# Patient Record
Sex: FEMALE | Race: ASIAN | NOT HISPANIC OR LATINO | URBAN - METROPOLITAN AREA
[De-identification: names, ages, dates, MRNs, and addresses within clinical notes are randomized per-mention and may not be internally consistent; named-entity substitution may affect disease eponyms.]

---

## 2017-10-13 ENCOUNTER — INPATIENT (INPATIENT)
Facility: HOSPITAL | Age: 72
LOS: 6 days | Discharge: HOME HEALTH | End: 2017-10-20
Attending: INTERNAL MEDICINE | Admitting: INTERNAL MEDICINE
Payer: MEDICAID

## 2017-10-13 VITALS
TEMPERATURE: 98 F | HEART RATE: 79 BPM | SYSTOLIC BLOOD PRESSURE: 151 MMHG | WEIGHT: 108.03 LBS | RESPIRATION RATE: 18 BRPM | OXYGEN SATURATION: 99 % | DIASTOLIC BLOOD PRESSURE: 73 MMHG

## 2017-10-13 DIAGNOSIS — Z95.1 PRESENCE OF AORTOCORONARY BYPASS GRAFT: Chronic | ICD-10-CM

## 2017-10-13 DIAGNOSIS — R07.9 CHEST PAIN, UNSPECIFIED: ICD-10-CM

## 2017-10-13 DIAGNOSIS — N18.3 CHRONIC KIDNEY DISEASE, STAGE 3 (MODERATE): ICD-10-CM

## 2017-10-13 DIAGNOSIS — I10 ESSENTIAL (PRIMARY) HYPERTENSION: ICD-10-CM

## 2017-10-13 DIAGNOSIS — R07.89 OTHER CHEST PAIN: ICD-10-CM

## 2017-10-13 LAB
BASOPHILS # BLD AUTO: 0.04 K/UL — SIGNIFICANT CHANGE UP (ref 0–0.2)
BASOPHILS NFR BLD AUTO: 0.3 % — SIGNIFICANT CHANGE UP (ref 0–2)
BLD GP AB SCN SERPL QL: NEGATIVE — SIGNIFICANT CHANGE UP
BUN SERPL-MCNC: 24 MG/DL — HIGH (ref 7–23)
CALCIUM SERPL-MCNC: 9.6 MG/DL — SIGNIFICANT CHANGE UP (ref 8.4–10.5)
CHLORIDE SERPL-SCNC: 101 MMOL/L — SIGNIFICANT CHANGE UP (ref 98–107)
CO2 SERPL-SCNC: 22 MMOL/L — SIGNIFICANT CHANGE UP (ref 22–31)
CREAT SERPL-MCNC: 1.53 MG/DL — HIGH (ref 0.5–1.3)
EOSINOPHIL # BLD AUTO: 0.27 K/UL — SIGNIFICANT CHANGE UP (ref 0–0.5)
EOSINOPHIL NFR BLD AUTO: 2.3 % — SIGNIFICANT CHANGE UP (ref 0–6)
GLUCOSE SERPL-MCNC: 307 MG/DL — HIGH (ref 70–99)
HBA1C BLD-MCNC: 10.4 % — HIGH (ref 4–5.6)
HCT VFR BLD CALC: 33.7 % — LOW (ref 34.5–45)
HCT VFR BLD CALC: 34.8 % — SIGNIFICANT CHANGE UP (ref 34.5–45)
HCT VFR BLD CALC: 40.1 % — SIGNIFICANT CHANGE UP (ref 34.5–45)
HGB BLD-MCNC: 10.9 G/DL — LOW (ref 11.5–15.5)
HGB BLD-MCNC: 11.4 G/DL — LOW (ref 11.5–15.5)
HGB BLD-MCNC: 13.1 G/DL — SIGNIFICANT CHANGE UP (ref 11.5–15.5)
IMM GRANULOCYTES # BLD AUTO: 0.06 # — SIGNIFICANT CHANGE UP
IMM GRANULOCYTES NFR BLD AUTO: 0.5 % — SIGNIFICANT CHANGE UP (ref 0–1.5)
LYMPHOCYTES # BLD AUTO: 29 % — SIGNIFICANT CHANGE UP (ref 13–44)
LYMPHOCYTES # BLD AUTO: 3.34 K/UL — HIGH (ref 1–3.3)
MCHC RBC-ENTMCNC: 27.9 PG — SIGNIFICANT CHANGE UP (ref 27–34)
MCHC RBC-ENTMCNC: 28.3 PG — SIGNIFICANT CHANGE UP (ref 27–34)
MCHC RBC-ENTMCNC: 28.4 PG — SIGNIFICANT CHANGE UP (ref 27–34)
MCHC RBC-ENTMCNC: 32.3 % — SIGNIFICANT CHANGE UP (ref 32–36)
MCHC RBC-ENTMCNC: 32.7 % — SIGNIFICANT CHANGE UP (ref 32–36)
MCHC RBC-ENTMCNC: 32.8 % — SIGNIFICANT CHANGE UP (ref 32–36)
MCV RBC AUTO: 86.2 FL — SIGNIFICANT CHANGE UP (ref 80–100)
MCV RBC AUTO: 86.4 FL — SIGNIFICANT CHANGE UP (ref 80–100)
MCV RBC AUTO: 87 FL — SIGNIFICANT CHANGE UP (ref 80–100)
MONOCYTES # BLD AUTO: 0.87 K/UL — SIGNIFICANT CHANGE UP (ref 0–0.9)
MONOCYTES NFR BLD AUTO: 7.5 % — SIGNIFICANT CHANGE UP (ref 2–14)
NEUTROPHILS # BLD AUTO: 6.95 K/UL — SIGNIFICANT CHANGE UP (ref 1.8–7.4)
NEUTROPHILS NFR BLD AUTO: 60.4 % — SIGNIFICANT CHANGE UP (ref 43–77)
NRBC # FLD: 0 — SIGNIFICANT CHANGE UP
PLATELET # BLD AUTO: 259 K/UL — SIGNIFICANT CHANGE UP (ref 150–400)
PLATELET # BLD AUTO: 266 K/UL — SIGNIFICANT CHANGE UP (ref 150–400)
PLATELET # BLD AUTO: 268 K/UL — SIGNIFICANT CHANGE UP (ref 150–400)
PMV BLD: 9.1 FL — SIGNIFICANT CHANGE UP (ref 7–13)
PMV BLD: 9.1 FL — SIGNIFICANT CHANGE UP (ref 7–13)
PMV BLD: 9.2 FL — SIGNIFICANT CHANGE UP (ref 7–13)
POTASSIUM SERPL-MCNC: 4.1 MMOL/L — SIGNIFICANT CHANGE UP (ref 3.5–5.3)
POTASSIUM SERPL-SCNC: 4.1 MMOL/L — SIGNIFICANT CHANGE UP (ref 3.5–5.3)
RBC # BLD: 3.91 M/UL — SIGNIFICANT CHANGE UP (ref 3.8–5.2)
RBC # BLD: 4.03 M/UL — SIGNIFICANT CHANGE UP (ref 3.8–5.2)
RBC # BLD: 4.61 M/UL — SIGNIFICANT CHANGE UP (ref 3.8–5.2)
RBC # FLD: 13.9 % — SIGNIFICANT CHANGE UP (ref 10.3–14.5)
RBC # FLD: 14 % — SIGNIFICANT CHANGE UP (ref 10.3–14.5)
RBC # FLD: 14.1 % — SIGNIFICANT CHANGE UP (ref 10.3–14.5)
RH IG SCN BLD-IMP: POSITIVE — SIGNIFICANT CHANGE UP
SODIUM SERPL-SCNC: 139 MMOL/L — SIGNIFICANT CHANGE UP (ref 135–145)
WBC # BLD: 10.67 K/UL — HIGH (ref 3.8–10.5)
WBC # BLD: 11.53 K/UL — HIGH (ref 3.8–10.5)
WBC # BLD: 8.72 K/UL — SIGNIFICANT CHANGE UP (ref 3.8–10.5)
WBC # FLD AUTO: 10.67 K/UL — HIGH (ref 3.8–10.5)
WBC # FLD AUTO: 11.53 K/UL — HIGH (ref 3.8–10.5)
WBC # FLD AUTO: 8.72 K/UL — SIGNIFICANT CHANGE UP (ref 3.8–10.5)

## 2017-10-13 PROCEDURE — 93010 ELECTROCARDIOGRAM REPORT: CPT

## 2017-10-13 RX ORDER — HYDROMORPHONE HYDROCHLORIDE 2 MG/ML
1 INJECTION INTRAMUSCULAR; INTRAVENOUS; SUBCUTANEOUS ONCE
Qty: 0 | Refills: 0 | Status: DISCONTINUED | OUTPATIENT
Start: 2017-10-13 | End: 2017-10-13

## 2017-10-13 RX ORDER — DEXTROSE 50 % IN WATER 50 %
25 SYRINGE (ML) INTRAVENOUS ONCE
Qty: 0 | Refills: 0 | Status: DISCONTINUED | OUTPATIENT
Start: 2017-10-13 | End: 2017-10-20

## 2017-10-13 RX ORDER — SODIUM CHLORIDE 9 MG/ML
1000 INJECTION, SOLUTION INTRAVENOUS
Qty: 0 | Refills: 0 | Status: DISCONTINUED | OUTPATIENT
Start: 2017-10-13 | End: 2017-10-20

## 2017-10-13 RX ORDER — DEXTROSE 50 % IN WATER 50 %
12.5 SYRINGE (ML) INTRAVENOUS ONCE
Qty: 0 | Refills: 0 | Status: DISCONTINUED | OUTPATIENT
Start: 2017-10-13 | End: 2017-10-20

## 2017-10-13 RX ORDER — DEXTROSE 50 % IN WATER 50 %
1 SYRINGE (ML) INTRAVENOUS ONCE
Qty: 0 | Refills: 0 | Status: DISCONTINUED | OUTPATIENT
Start: 2017-10-13 | End: 2017-10-20

## 2017-10-13 RX ORDER — INSULIN LISPRO 100/ML
24 VIAL (ML) SUBCUTANEOUS
Qty: 0 | Refills: 0 | Status: DISCONTINUED | OUTPATIENT
Start: 2017-10-13 | End: 2017-10-20

## 2017-10-13 RX ORDER — ASPIRIN/CALCIUM CARB/MAGNESIUM 324 MG
81 TABLET ORAL ONCE
Qty: 0 | Refills: 0 | Status: COMPLETED | OUTPATIENT
Start: 2017-10-13 | End: 2017-10-13

## 2017-10-13 RX ORDER — INSULIN LISPRO 100/ML
VIAL (ML) SUBCUTANEOUS
Qty: 0 | Refills: 0 | Status: DISCONTINUED | OUTPATIENT
Start: 2017-10-13 | End: 2017-10-17

## 2017-10-13 RX ORDER — MONTELUKAST 4 MG/1
10 TABLET, CHEWABLE ORAL AT BEDTIME
Qty: 0 | Refills: 0 | Status: DISCONTINUED | OUTPATIENT
Start: 2017-10-13 | End: 2017-10-20

## 2017-10-13 RX ORDER — ACETYLCYSTEINE 200 MG/ML
1200 VIAL (ML) MISCELLANEOUS
Qty: 0 | Refills: 0 | Status: COMPLETED | OUTPATIENT
Start: 2017-10-13 | End: 2017-10-13

## 2017-10-13 RX ORDER — FAMOTIDINE 10 MG/ML
20 INJECTION INTRAVENOUS DAILY
Qty: 0 | Refills: 0 | Status: DISCONTINUED | OUTPATIENT
Start: 2017-10-13 | End: 2017-10-20

## 2017-10-13 RX ORDER — INSULIN GLARGINE 100 [IU]/ML
65 INJECTION, SOLUTION SUBCUTANEOUS AT BEDTIME
Qty: 0 | Refills: 0 | Status: DISCONTINUED | OUTPATIENT
Start: 2017-10-13 | End: 2017-10-20

## 2017-10-13 RX ORDER — ATORVASTATIN CALCIUM 80 MG/1
40 TABLET, FILM COATED ORAL AT BEDTIME
Qty: 0 | Refills: 0 | Status: DISCONTINUED | OUTPATIENT
Start: 2017-10-13 | End: 2017-10-20

## 2017-10-13 RX ORDER — SODIUM BICARBONATE 1 MEQ/ML
0.23 SYRINGE (ML) INTRAVENOUS
Qty: 150 | Refills: 0 | Status: DISCONTINUED | OUTPATIENT
Start: 2017-10-13 | End: 2017-10-20

## 2017-10-13 RX ORDER — SODIUM CHLORIDE 9 MG/ML
3 INJECTION INTRAMUSCULAR; INTRAVENOUS; SUBCUTANEOUS EVERY 8 HOURS
Qty: 0 | Refills: 0 | Status: DISCONTINUED | OUTPATIENT
Start: 2017-10-13 | End: 2017-10-20

## 2017-10-13 RX ORDER — ASPIRIN/CALCIUM CARB/MAGNESIUM 324 MG
81 TABLET ORAL DAILY
Qty: 0 | Refills: 0 | Status: DISCONTINUED | OUTPATIENT
Start: 2017-10-14 | End: 2017-10-20

## 2017-10-13 RX ORDER — METOPROLOL TARTRATE 50 MG
25 TABLET ORAL
Qty: 0 | Refills: 0 | Status: DISCONTINUED | OUTPATIENT
Start: 2017-10-13 | End: 2017-10-20

## 2017-10-13 RX ORDER — FUROSEMIDE 40 MG
20 TABLET ORAL DAILY
Qty: 0 | Refills: 0 | Status: DISCONTINUED | OUTPATIENT
Start: 2017-10-13 | End: 2017-10-17

## 2017-10-13 RX ORDER — SODIUM CHLORIDE 9 MG/ML
500 INJECTION INTRAMUSCULAR; INTRAVENOUS; SUBCUTANEOUS
Qty: 0 | Refills: 0 | Status: DISCONTINUED | OUTPATIENT
Start: 2017-10-13 | End: 2017-10-20

## 2017-10-13 RX ORDER — LEVOTHYROXINE SODIUM 125 MCG
50 TABLET ORAL DAILY
Qty: 0 | Refills: 0 | Status: DISCONTINUED | OUTPATIENT
Start: 2017-10-13 | End: 2017-10-20

## 2017-10-13 RX ORDER — GLUCAGON INJECTION, SOLUTION 0.5 MG/.1ML
1 INJECTION, SOLUTION SUBCUTANEOUS ONCE
Qty: 0 | Refills: 0 | Status: DISCONTINUED | OUTPATIENT
Start: 2017-10-13 | End: 2017-10-20

## 2017-10-13 RX ADMIN — Medication 1200 MILLIGRAM(S): at 22:49

## 2017-10-13 RX ADMIN — ATORVASTATIN CALCIUM 40 MILLIGRAM(S): 80 TABLET, FILM COATED ORAL at 22:10

## 2017-10-13 RX ADMIN — HYDROMORPHONE HYDROCHLORIDE 1 MILLIGRAM(S): 2 INJECTION INTRAMUSCULAR; INTRAVENOUS; SUBCUTANEOUS at 19:00

## 2017-10-13 RX ADMIN — SODIUM CHLORIDE 3 MILLILITER(S): 9 INJECTION INTRAMUSCULAR; INTRAVENOUS; SUBCUTANEOUS at 22:09

## 2017-10-13 RX ADMIN — HYDROMORPHONE HYDROCHLORIDE 1 MILLIGRAM(S): 2 INJECTION INTRAMUSCULAR; INTRAVENOUS; SUBCUTANEOUS at 18:29

## 2017-10-13 RX ADMIN — Medication 81 MILLIGRAM(S): at 14:04

## 2017-10-13 RX ADMIN — Medication 1200 MILLIGRAM(S): at 14:04

## 2017-10-13 RX ADMIN — Medication 4: at 19:04

## 2017-10-13 RX ADMIN — Medication 2: at 13:59

## 2017-10-13 RX ADMIN — INSULIN GLARGINE 65 UNIT(S): 100 INJECTION, SOLUTION SUBCUTANEOUS at 22:52

## 2017-10-13 RX ADMIN — MONTELUKAST 10 MILLIGRAM(S): 4 TABLET, CHEWABLE ORAL at 22:10

## 2017-10-13 RX ADMIN — Medication 75 MEQ/KG/HR: at 20:09

## 2017-10-13 NOTE — H&P CARDIOLOGY - PMH
CAD (coronary artery disease)  s/p CABG  Diabetes mellitus, type 2    GERD (gastroesophageal reflux disease)    Hyperlipidemia    Hypothyroidism

## 2017-10-13 NOTE — CHART NOTE - NSCHARTNOTEFT_GEN_A_CORE
case discussed with Dr Ga, monitor Cr this week, no need for post IVF hydration at this time given LVEDP 24, continue ASA, no plavix at this time, will determine PCI of ostial ramus Monday if Cr stable

## 2017-10-13 NOTE — PATIENT PROFILE ADULT. - VISION (WITH CORRECTIVE LENSES IF THE PATIENT USUALLY WEARS THEM):
Partially impaired: cannot see medication labels or newsprint, but can see obstacles in path, and the surrounding layout; can count fingers at arm's length/with eye glasses

## 2017-10-13 NOTE — CONSULT NOTE ADULT - ASSESSMENT
71 year old Slovak speaking female with HTN, hyperlipidemia, DM-II, known CAD with 3V CABG (LIMA to LAD, SVG to OM, SVG to PDA) at [place] [address] who c/o chest pain with subsequent abnormal stress test.   In light of patients CAD history, symptoms and abnormal noninvasive test findings there is high suspicion for CAD progression. Patient is now referred to [facility] for a cardiac catheterization with possible PTCA/stent.    1 CAD  - Telemonitor   - sp cardiac cath  - cw current meds  - cards fu    2 DM  - monitor FS  - sliding scale coverage    3 HTN  - cw home meds

## 2017-10-13 NOTE — CHART NOTE - NSCHARTNOTEFT_GEN_A_CORE
s/p Cath with large hematoma, s/p compression.  VSS.  CBC and Type and screen sent stat.  US Groin ordered.  Pt. stable.  Bedrest.  d/w cath lab and attending.

## 2017-10-13 NOTE — CONSULT NOTE ADULT - SUBJECTIVE AND OBJECTIVE BOX
cc here for cardiac cath  [place] 71 year old Syriac speaking female with HTN, hyperlipidemia, DM-II, known CAD with 3V CABG (LIMA to LAD, SVG to OM, SVG to PDA) at [place] [address] who c/o chest pain with subsequent abnormal stress test.   In light of patients CAD history, symptoms and abnormal noninvasive test findings there is high suspicion for CAD progression. Patient is now referred to [facility] for a cardiac catheterization with possible PTCA/stent.      Allergies NKDA    Review of systems    REVIEW OF SYSTEMS:    CONSTITUTIONAL: No weakness, fevers or chills  EYES/ENT: No visual changes;  No vertigo or throat pain   NECK: No pain or stiffness  RESPIRATORY: No cough, wheezing, hemoptysis; No shortness of breath  CARDIOVASCULAR: No chest pain or palpitations  GASTROINTESTINAL: No abdominal or epigastric pain. No nausea, vomiting, or hematemesis; No diarrhea or constipation. No melena or hematochezia.  GENITOURINARY: No dysuria, frequency or hematuria  NEUROLOGICAL: No numbness or weakness  SKIN: No itching, burning, rashes, or lesions   All other review of systems is negative unless indicated above.    Past Medical History:  CAD (coronary artery disease)  s/p CABG  Diabetes mellitus, type 2    GERD (gastroesophageal reflux disease)    Hyperlipidemia    Hypothyroidism.    Home Medications:   * Patient Currently Takes Medications as of 13-Oct-2017 09:32 documented in Structured Notes  · 	aspirin 81 mg oral delayed release tablet: Last Dose Taken:  , 1 tab(s) orally once a day  · 	metoprolol tartrate 25 mg oral tablet: Last Dose Taken:  , 1 tab(s) orally 2 times a day  · 	montelukast 10 mg oral tablet: Last Dose Taken:  , 1 tab(s) orally once a day (at bedtime)  · 	levothyroxine 50 mcg (0.05 mg) oral tablet: Last Dose Taken:  , 1 tab(s) orally once a day  · 	Lasix 20 mg oral tablet: Last Dose Taken:  , 1 tab(s) orally once a day  · 	Mapap 500 mg oral capsule: 1 tab(s) orally once a day  · 	Vol-Care Rx oral tablet: 1 tab(s) orally once a day  · 	raNITIdine 150 mg oral capsule: 1 cap(s) orally 2 times a day  · 	atorvastatin 40 mg oral tablet: Last Dose Taken:  , 1 tab(s) orally once a day  · 	alendronate 70 mg oral tablet: Last Dose Taken:  , 1 tab(s) orally once a week  · 	Apidra 100 units/mL subcutaneous solution: Last Dose Taken:  , 24 unit(s) subcutaneous once a day (in the morning)  · 	Apidra 100 units/mL subcutaneous solution: Last Dose Taken:  , 26 microcurie subcutaneous with lunch and dinner  · 	Toujeo SoloStar 300 units/mL subcutaneous solution: 65 unit(s) subcutaneous once a day (at bedtime)    Past Surgical History:  S/P CABG (coronary artery bypass graft).      Tobacco Usage:  · Tobacco Usage: Never smoker	    Family History:  No pertinent family history in first degree relatives.    Physical exam    General: WN/WD NAD  Neurology: A&Ox3, nonfocal, CUADRA x 4  Eyes: PERRLA/ EOMI, Gross vision intact  ENT/Neck: Neck supple, trachea midline, No JVD, Gross hearing intact  Respiratory: CTA B/L, No wheezing, rales, rhonchi  CV: RRR, S1S2, no murmurs, rubs or gallops  Abdominal: Soft, NT, ND +BS,   Extremities: No edema, + peripheral pulses  Skin: No Rashes, Hematoma, Ecchymosis      Labs                            13.1   8.72  )-----------( 266      ( 13 Oct 2017 08:45 )             40.1       10-13    139  |  101  |  24<H>  ----------------------------<  307<H>  4.1   |  22  |  1.53<H>    Ca    9.6      13 Oct 2017 08:45                        Lactate Trend            CAPILLARY BLOOD GLUCOSE

## 2017-10-13 NOTE — H&P CARDIOLOGY - SOURCE
Patient/- reliable and medical records with daughter translating at patients request via pacific  # - reliable and medical records/Patient with daughter translating at patients request via pacific  # [id number] - reliable and medical records/Patient

## 2017-10-13 NOTE — CONSULT NOTE ADULT - ASSESSMENT
71 year old Czech speaking female with HTN, hyperlipidemia, DM-II, known CAD with 3V CABG (LIMA to LAD, SVG to OM, SVG to PDA) at [facility] 2014 who c/o chest pain with subsequent abnormal stress test. s/p diagnostic angiogram today. likely will need PCI on monday

## 2017-10-13 NOTE — CONSULT NOTE ADULT - SUBJECTIVE AND OBJECTIVE BOX
Dr. Muhammad (Nephrology)  Office [phone]  Cell [phone]  Tr[facility] FIELD  Cell [phone]    HPI:  71 year old Frisian speaking female with HTN, hyperlipidemia, DM-II, known CAD with 3V CABG (LIMA to LAD, SVG to OM, SVG to PDA) at [place] [address] who c/o chest pain with subsequent abnormal stress test.   In light of patients CAD history, symptoms and abnormal noninvasive test findings there is high suspicion for CAD progression. Patient is now referred to [facility] for a cardiac catheterization with possible PTCA/stent.       Allergies:  No Known Allergies      PAST MEDICAL & SURGICAL HISTORY:  GERD (gastroesophageal reflux disease)  CAD (coronary artery disease): s/p CABG  Hypothyroidism  Hyperlipidemia  Diabetes mellitus, type 2  S/P CABG (coronary artery bypass graft)      Home Medications Reviewed    Hospital Medications:   MEDICATIONS  (STANDING):  acetylcysteine  Oral Solution 1200 milliGRAM(s) Oral two times a day  atorvastatin 40 milliGRAM(s) Oral at bedtime  dextrose 5%. 1000 milliLiter(s) (50 mL/Hr) IV Continuous <Continuous>  dextrose 50% Injectable 12.5 Gram(s) IV Push once  dextrose 50% Injectable 25 Gram(s) IV Push once  dextrose 50% Injectable 25 Gram(s) IV Push once  famotidine    Tablet 20 milliGRAM(s) Oral daily  furosemide    Tablet 20 milliGRAM(s) Oral daily  insulin glargine Injectable (LANTUS) 65 Unit(s) SubCutaneous at bedtime  insulin lispro (HumaLOG) corrective regimen sliding scale   SubCutaneous three times a day before meals  insulin lispro Injectable (HumaLOG) 24 Unit(s) SubCutaneous three times a day with meals  levothyroxine 50 MICROGram(s) Oral daily  metoprolol 25 milliGRAM(s) Oral two times a day  montelukast 10 milliGRAM(s) Oral at bedtime  sodium chloride 0.9% lock flush 3 milliLiter(s) IV Push every 8 hours  sodium chloride 0.9%. 500 milliLiter(s) (100 mL/Hr) IV Continuous <Continuous>      SOCIAL HISTORY:  Denies ETOh, Smoking,     FAMILY HISTORY:  No pertinent family history in first degree relatives      REVIEW OF SYSTEMS:  CONSTITUTIONAL: No weakness, fevers or chills  EYES/ENT: No visual changes;  No vertigo or throat pain   NECK: No pain or stiffness  RESPIRATORY: + shortness of breath  CARDIOVASCULAR: + chest pain or palpitations.  GASTROINTESTINAL: No abdominal or epigastric pain. No nausea, vomiting, or hematemesis; No diarrhea or constipation. No melena or hematochezia.  GENITOURINARY: No dysuria, frequency, foamy urine, urinary urgency, incontinence or hematuria  NEUROLOGICAL: No numbness or weakness  SKIN: No itching, burning, rashes, or lesions   VASCULAR: No bilateral lower extremity edema.   All other review of systems is negative unless indicated above.    VITALS:  T(F): --  HR: --  BP: --  RR: --  SpO2: --  Wt(kg): --        PHYSICAL EXAM:  Constitutional: NAD  HEENT: anicteric sclera, oropharynx clear, MMM  Neck: No JVD  Respiratory: CTAB, no wheezes, rales or rhonchi  Cardiovascular: S1, S2, RRR  Gastrointestinal: BS+, soft, NT/ND  Extremities: No cyanosis or clubbing. No peripheral edema  Neurological: A/O x 3, no focal deficits  Psychiatric: Normal mood, normal affect  : No CVA tenderness. No cuellar.   Skin: No rashes    LABS:  10-13    139  |  101  |  24<H>  ----------------------------<  307<H>  4.1   |  22  |  1.53<H>    Ca    9.6      13 Oct 2017 08:45      Creatinine Trend: 1.53 <--                        13.1   8.72  )-----------( 266      ( 13 Oct 2017 08:45 )             40.1     Urine Studies:        RADIOLOGY & ADDITIONAL STUDIES:

## 2017-10-13 NOTE — H&P CARDIOLOGY - ATTENDING COMMENTS
Patient seen and examined.  Agree with above.   -Cardiac cath performed showing closed SVG to OM and SVG to RPDA with patient LIMA-LAD  -Pt. with ostial RI lesion   -Will plan on staged PCI of ostial RI due to elevated cr  -renal consult with Dr. Gunnar Ga MD  [facility]  [address] [place] [address], [id number]  [place] [address]  office: [phone]  pager: [phone]

## 2017-10-13 NOTE — H&P CARDIOLOGY - HISTORY OF PRESENT ILLNESS
71 year old Greenlandic speaking female with HTN, hyperlipidemia, DM-II, known CAD with 3V CABG (LIMA to LAD, SVG to OM, SVG to PDA) at [place] [address] who c/o chest pain with subsequent abnormal stress test.   In light of patients CAD history, symptoms and abnormal noninvasive test findings there is high suspicion for CAD progression. Patient is now referred to [facility] for a cardiac catheterization with possible PTCA/stent.     please see hard copy H&P in paper chart  PATIENT SEEN AND EXAMINED AND NO NEW CLINICAL CHANGES SINCE office visit

## 2017-10-13 NOTE — CHART NOTE - NSCHARTNOTEFT_GEN_A_CORE
labs noteable for Cr 1.5, IVF hdyration ordered pre cath  labs noted for hgbA1c 10.4, The patient via daughter was made aware of uncontrolled diabetes. diabetes education given.  daughter admits to very recent change in her insulin therapy and will monitor closely with her PMD/endocrinologist

## 2017-10-14 LAB
ALBUMIN SERPL ELPH-MCNC: 3.5 G/DL — SIGNIFICANT CHANGE UP (ref 3.3–5)
ALP SERPL-CCNC: 69 U/L — SIGNIFICANT CHANGE UP (ref 40–120)
ALT FLD-CCNC: 32 U/L — SIGNIFICANT CHANGE UP (ref 4–33)
AST SERPL-CCNC: 41 U/L — HIGH (ref 4–32)
BILIRUB SERPL-MCNC: 0.3 MG/DL — SIGNIFICANT CHANGE UP (ref 0.2–1.2)
BUN SERPL-MCNC: 30 MG/DL — HIGH (ref 7–23)
CALCIUM SERPL-MCNC: 10 MG/DL — SIGNIFICANT CHANGE UP (ref 8.4–10.5)
CHLORIDE SERPL-SCNC: 100 MMOL/L — SIGNIFICANT CHANGE UP (ref 98–107)
CO2 SERPL-SCNC: 26 MMOL/L — SIGNIFICANT CHANGE UP (ref 22–31)
CREAT SERPL-MCNC: 1.49 MG/DL — HIGH (ref 0.5–1.3)
GLUCOSE SERPL-MCNC: 282 MG/DL — HIGH (ref 70–99)
HCT VFR BLD CALC: 33.5 % — LOW (ref 34.5–45)
HGB BLD-MCNC: 10.9 G/DL — LOW (ref 11.5–15.5)
MAGNESIUM SERPL-MCNC: 1.8 MG/DL — SIGNIFICANT CHANGE UP (ref 1.6–2.6)
MCHC RBC-ENTMCNC: 28.2 PG — SIGNIFICANT CHANGE UP (ref 27–34)
MCHC RBC-ENTMCNC: 32.5 % — SIGNIFICANT CHANGE UP (ref 32–36)
MCV RBC AUTO: 86.6 FL — SIGNIFICANT CHANGE UP (ref 80–100)
NRBC # FLD: 0 — SIGNIFICANT CHANGE UP
PHOSPHATE SERPL-MCNC: 4.8 MG/DL — HIGH (ref 2.5–4.5)
PLATELET # BLD AUTO: 255 K/UL — SIGNIFICANT CHANGE UP (ref 150–400)
PMV BLD: 9.4 FL — SIGNIFICANT CHANGE UP (ref 7–13)
POTASSIUM SERPL-MCNC: 4 MMOL/L — SIGNIFICANT CHANGE UP (ref 3.5–5.3)
POTASSIUM SERPL-SCNC: 4 MMOL/L — SIGNIFICANT CHANGE UP (ref 3.5–5.3)
PROT SERPL-MCNC: 7 G/DL — SIGNIFICANT CHANGE UP (ref 6–8.3)
RBC # BLD: 3.87 M/UL — SIGNIFICANT CHANGE UP (ref 3.8–5.2)
RBC # FLD: 14 % — SIGNIFICANT CHANGE UP (ref 10.3–14.5)
SODIUM SERPL-SCNC: 140 MMOL/L — SIGNIFICANT CHANGE UP (ref 135–145)
WBC # BLD: 11.29 K/UL — HIGH (ref 3.8–10.5)
WBC # FLD AUTO: 11.29 K/UL — HIGH (ref 3.8–10.5)

## 2017-10-14 RX ORDER — SODIUM BICARBONATE 1 MEQ/ML
0.61 SYRINGE (ML) INTRAVENOUS
Qty: 150 | Refills: 0 | Status: DISCONTINUED | OUTPATIENT
Start: 2017-10-16 | End: 2017-10-16

## 2017-10-14 RX ORDER — SODIUM BICARBONATE 1 MEQ/ML
0.23 SYRINGE (ML) INTRAVENOUS
Qty: 150 | Refills: 0 | Status: DISCONTINUED | OUTPATIENT
Start: 2017-10-16 | End: 2017-10-16

## 2017-10-14 RX ORDER — ACETYLCYSTEINE 200 MG/ML
1200 VIAL (ML) MISCELLANEOUS
Qty: 0 | Refills: 0 | Status: COMPLETED | OUTPATIENT
Start: 2017-10-15 | End: 2017-10-16

## 2017-10-14 RX ORDER — INFLUENZA VIRUS VACCINE 15; 15; 15; 15 UG/.5ML; UG/.5ML; UG/.5ML; UG/.5ML
0.5 SUSPENSION INTRAMUSCULAR ONCE
Qty: 0 | Refills: 0 | Status: DISCONTINUED | OUTPATIENT
Start: 2017-10-14 | End: 2017-10-20

## 2017-10-14 RX ADMIN — SODIUM CHLORIDE 3 MILLILITER(S): 9 INJECTION INTRAMUSCULAR; INTRAVENOUS; SUBCUTANEOUS at 13:15

## 2017-10-14 RX ADMIN — Medication 2: at 10:03

## 2017-10-14 RX ADMIN — ATORVASTATIN CALCIUM 40 MILLIGRAM(S): 80 TABLET, FILM COATED ORAL at 22:26

## 2017-10-14 RX ADMIN — Medication 81 MILLIGRAM(S): at 13:16

## 2017-10-14 RX ADMIN — Medication 20 MILLIGRAM(S): at 05:20

## 2017-10-14 RX ADMIN — Medication 24 UNIT(S): at 18:29

## 2017-10-14 RX ADMIN — Medication 24 UNIT(S): at 13:16

## 2017-10-14 RX ADMIN — MONTELUKAST 10 MILLIGRAM(S): 4 TABLET, CHEWABLE ORAL at 22:28

## 2017-10-14 RX ADMIN — Medication 24 UNIT(S): at 10:02

## 2017-10-14 RX ADMIN — Medication 1: at 13:16

## 2017-10-14 RX ADMIN — FAMOTIDINE 20 MILLIGRAM(S): 10 INJECTION INTRAVENOUS at 13:16

## 2017-10-14 RX ADMIN — SODIUM CHLORIDE 3 MILLILITER(S): 9 INJECTION INTRAMUSCULAR; INTRAVENOUS; SUBCUTANEOUS at 22:24

## 2017-10-14 RX ADMIN — Medication 25 MILLIGRAM(S): at 18:29

## 2017-10-14 RX ADMIN — SODIUM CHLORIDE 3 MILLILITER(S): 9 INJECTION INTRAMUSCULAR; INTRAVENOUS; SUBCUTANEOUS at 05:21

## 2017-10-14 RX ADMIN — Medication 50 MICROGRAM(S): at 05:20

## 2017-10-14 RX ADMIN — INSULIN GLARGINE 65 UNIT(S): 100 INJECTION, SOLUTION SUBCUTANEOUS at 22:26

## 2017-10-14 RX ADMIN — Medication 1: at 18:29

## 2017-10-14 RX ADMIN — Medication 25 MILLIGRAM(S): at 05:20

## 2017-10-14 NOTE — PHYSICAL THERAPY INITIAL EVALUATION ADULT - PERTINENT HX OF CURRENT PROBLEM, REHAB EVAL
71 year old Yoruba speaking female with HTN, hyperlipidemia, DM-II, known CAD with 3V CABG (LIMA to LAD, SVG to OM, SVG to PDA) at [facility] 2014 who c/o chest pain with subsequent abnormal stress test.  Now s/p cardiac cath

## 2017-10-14 NOTE — PROGRESS NOTE ADULT - SUBJECTIVE AND OBJECTIVE BOX
NAD    INTERVAL HPI/OVERNIGHT EVENTS:  T(C): 36.7 (10-14-17 @ 05:19), Max: 36.7 (10-13-17 @ 17:56)  HR: 81 (10-14-17 @ 05:19) (76 - 86)  BP: 156/78 (10-14-17 @ 05:19) (106/54 - 156/78)  RR: 18 (10-14-17 @ 05:19) (18 - 18)  SpO2: 100% (10-14-17 @ 05:19) (95% - 100%)  Wt(kg): --  I&O's Summary      LABS:                        10.9   11.29 )-----------( 255      ( 14 Oct 2017 05:47 )             33.5     10-14    140  |  100  |  30<H>  ----------------------------<  282<H>  4.0   |  26  |  1.49<H>    Ca    10.0      14 Oct 2017 05:47  Phos  4.8     10-14  Mg     1.8     10-14    TPro  7.0  /  Alb  3.5  /  TBili  0.3  /  DBili  x   /  AST  41<H>  /  ALT  32  /  AlkPhos  69  10-14        CAPILLARY BLOOD GLUCOSE  329 (14 Oct 2017 00:54)  305 (13 Oct 2017 22:15)  324 (13 Oct 2017 17:59)      POCT Blood Glucose.: 236 mg/dL (14 Oct 2017 09:07)  POCT Blood Glucose.: 329 mg/dL (14 Oct 2017 00:54)            MEDICATIONS  (STANDING):  aspirin enteric coated 81 milliGRAM(s) Oral daily  atorvastatin 40 milliGRAM(s) Oral at bedtime  dextrose 5%. 1000 milliLiter(s) (50 mL/Hr) IV Continuous <Continuous>  dextrose 50% Injectable 12.5 Gram(s) IV Push once  dextrose 50% Injectable 25 Gram(s) IV Push once  dextrose 50% Injectable 25 Gram(s) IV Push once  famotidine    Tablet 20 milliGRAM(s) Oral daily  furosemide    Tablet 20 milliGRAM(s) Oral daily  influenza   Vaccine 0.5 milliLiter(s) IntraMuscular once  insulin glargine Injectable (LANTUS) 65 Unit(s) SubCutaneous at bedtime  insulin lispro (HumaLOG) corrective regimen sliding scale   SubCutaneous three times a day before meals  insulin lispro Injectable (HumaLOG) 24 Unit(s) SubCutaneous three times a day with meals  levothyroxine 50 MICROGram(s) Oral daily  metoprolol 25 milliGRAM(s) Oral two times a day  montelukast 10 milliGRAM(s) Oral at bedtime  sodium bicarbonate  Infusion 0.23 mEq/kG/Hr (75 mL/Hr) IV Continuous <Continuous>  sodium chloride 0.9% lock flush 3 milliLiter(s) IV Push every 8 hours  sodium chloride 0.9%. 500 milliLiter(s) (100 mL/Hr) IV Continuous <Continuous>    MEDICATIONS  (PRN):  dextrose Gel 1 Dose(s) Oral once PRN Blood Glucose LESS THAN 70 milliGRAM(s)/deciliter  glucagon  Injectable 1 milliGRAM(s) IntraMuscular once PRN Glucose LESS THAN 70 milligrams/deciliter          PHYSICAL EXAM:  GENERAL: NAD, well-groomed, well-developed  HEAD:  Atraumatic, Normocephalic  CHEST/LUNG: Clear to percussion bilaterally; No rales, rhonchi, wheezing, or rubs  HEART: Regular rate and rhythm; No murmurs, rubs, or gallops  ABDOMEN: Soft, Nontender, Nondistended; Bowel sounds present  EXTREMITIES:  2+ Peripheral Pulses, No clubbing, cyanosis, or edema  LYMPH: No lymphadenopathy noted  SKIN: No rashes or lesions    Care Discussed with Consultants/Other Providers [ ] YES  [ ] NO

## 2017-10-14 NOTE — PROGRESS NOTE ADULT - SUBJECTIVE AND OBJECTIVE BOX
Dr. Muhammad (Nephrology)  Office [phone]  Cell [phone]  [facility] FIELD  Cell [phone]      Patient is a 71y old  Female who presents with a chief complaint of     Patient seen and examined at bedside. No chest pain/sob    VITALS:  T(F): 98.1 (10-14-17 @ 05:19), Max: 98.1 (10-13-17 @ 17:56)  HR: 81 (10-14-17 @ 05:19)  BP: 156/78 (10-14-17 @ 05:19)  RR: 18 (10-14-17 @ 05:19)  SpO2: 100% (10-14-17 @ 05:19)  Wt(kg): --    Height (cm): 165.1 (10-13 @ 21:50)  Weight (kg): 49 (10-13 @ 17:56)  BMI (kg/m2): 18 (10-13 @ 21:50)  BSA (m2): 1.52 (10-13 @ 21:50)    PHYSICAL EXAM:  Constitutional: NAD  Neck: No JVD  Respiratory: CTAB, no wheezes, rales or rhonchi  Cardiovascular: S1, S2, RRR  Gastrointestinal: BS+, soft, NT/ND  Extremities: No peripheral edema    Hospital Medications:   MEDICATIONS  (STANDING):  aspirin enteric coated 81 milliGRAM(s) Oral daily  atorvastatin 40 milliGRAM(s) Oral at bedtime  dextrose 5%. 1000 milliLiter(s) (50 mL/Hr) IV Continuous <Continuous>  dextrose 50% Injectable 12.5 Gram(s) IV Push once  dextrose 50% Injectable 25 Gram(s) IV Push once  dextrose 50% Injectable 25 Gram(s) IV Push once  famotidine    Tablet 20 milliGRAM(s) Oral daily  furosemide    Tablet 20 milliGRAM(s) Oral daily  influenza   Vaccine 0.5 milliLiter(s) IntraMuscular once  insulin glargine Injectable (LANTUS) 65 Unit(s) SubCutaneous at bedtime  insulin lispro (HumaLOG) corrective regimen sliding scale   SubCutaneous three times a day before meals  insulin lispro Injectable (HumaLOG) 24 Unit(s) SubCutaneous three times a day with meals  levothyroxine 50 MICROGram(s) Oral daily  metoprolol 25 milliGRAM(s) Oral two times a day  montelukast 10 milliGRAM(s) Oral at bedtime  sodium bicarbonate  Infusion 0.23 mEq/kG/Hr (75 mL/Hr) IV Continuous <Continuous>  sodium chloride 0.9% lock flush 3 milliLiter(s) IV Push every 8 hours  sodium chloride 0.9%. 500 milliLiter(s) (100 mL/Hr) IV Continuous <Continuous>      LABS:  10-14    140  |  100  |  30<H>  ----------------------------<  282<H>  4.0   |  26  |  1.49<H>    Ca    10.0      14 Oct 2017 05:47  Phos  4.8     10-14  Mg     1.8     10-14    TPro  7.0  /  Alb  3.5  /  TBili  0.3  /  DBili      /  AST  41<H>  /  ALT  32  /  AlkPhos  69  10-14    Creatinine Trend: 1.49 <--, 1.53 <--    Albumin, Serum: 3.5 g/dL (10-14 @ 05:47)  Phosphorus Level, Serum: 4.8 mg/dL (10-14 @ 05:47)                              10.9   11.29 )-----------( 255      ( 14 Oct 2017 05:47 )             33.5     Urine Studies:      HbA1c 10.4      [10-13-17 @ 08:45]        RADIOLOGY & ADDITIONAL STUDIES:

## 2017-10-14 NOTE — PHYSICAL THERAPY INITIAL EVALUATION ADULT - ADDITIONAL COMMENTS
Patient lives with daughter in a home; patients bedroom is on the 1st floor, however, patient did not use an AD

## 2017-10-14 NOTE — PROGRESS NOTE ADULT - SUBJECTIVE AND OBJECTIVE BOX
Subjective:    Denies chest pain / shortness of breath     MEDICATIONS:  MEDICATIONS  (STANDING):  aspirin enteric coated 81 milliGRAM(s) Oral daily  atorvastatin 40 milliGRAM(s) Oral at bedtime  dextrose 5%. 1000 milliLiter(s) (50 mL/Hr) IV Continuous <Continuous>  dextrose 50% Injectable 12.5 Gram(s) IV Push once  dextrose 50% Injectable 25 Gram(s) IV Push once  dextrose 50% Injectable 25 Gram(s) IV Push once  famotidine    Tablet 20 milliGRAM(s) Oral daily  furosemide    Tablet 20 milliGRAM(s) Oral daily  influenza   Vaccine 0.5 milliLiter(s) IntraMuscular once  insulin glargine Injectable (LANTUS) 65 Unit(s) SubCutaneous at bedtime  insulin lispro (HumaLOG) corrective regimen sliding scale   SubCutaneous three times a day before meals  insulin lispro Injectable (HumaLOG) 24 Unit(s) SubCutaneous three times a day with meals  levothyroxine 50 MICROGram(s) Oral daily  metoprolol 25 milliGRAM(s) Oral two times a day  montelukast 10 milliGRAM(s) Oral at bedtime  sodium bicarbonate  Infusion 0.23 mEq/kG/Hr (75 mL/Hr) IV Continuous <Continuous>  sodium chloride 0.9% lock flush 3 milliLiter(s) IV Push every 8 hours  sodium chloride 0.9%. 500 milliLiter(s) (100 mL/Hr) IV Continuous <Continuous>    MEDICATIONS  (PRN):  dextrose Gel 1 Dose(s) Oral once PRN Blood Glucose LESS THAN 70 milliGRAM(s)/deciliter  glucagon  Injectable 1 milliGRAM(s) IntraMuscular once PRN Glucose LESS THAN 70 milligrams/deciliter      LABS:	 	    CARDIAC MARKERS:                                10.9   11.29 )-----------( 255      ( 14 Oct 2017 05:47 )             33.5     10-14    140  |  100  |  30<H>  ----------------------------<  282<H>  4.0   |  26  |  1.49<H>    Ca    10.0      14 Oct 2017 05:47  Phos  4.8     10-14  Mg     1.8     10-14    TPro  7.0  /  Alb  3.5  /  TBili  0.3  /  DBili  x   /  AST  41<H>  /  ALT  32  /  AlkPhos  69  10-14    COAGS:       proBNP:   Lipid Profile:   HgA1c:   TSH:       PHYSICAL EXAM:  T(C): 36.7 (10-14-17 @ 05:19), Max: 36.7 (10-13-17 @ 17:56)  HR: 81 (10-14-17 @ 05:19) (76 - 86)  BP: 156/78 (10-14-17 @ 05:19) (106/54 - 156/78)  RR: 18 (10-14-17 @ 05:19) (18 - 18)  SpO2: 100% (10-14-17 @ 05:19) (95% - 100%)  Wt(kg): --  I&O's Summary    Height (cm): 165.1 (10-13 @ 21:50)  Weight (kg): 49 (10-13 @ 17:56)  BMI (kg/m2): 18 (10-13 @ 21:50)  BSA (m2): 1.52 (10-13 @ 21:50)      Cardiovascular: Normal S1 S2,  RRR   No JVD, 1/6 YUSUF murmur, Peripheral pulses palpable 2+ bilaterally  Respiratory: Lungs clear to auscultation, normal effort 	  Gastrointestinal:  Soft, Non-tender, + BS	  Extremities no edema, cyanosis, clubbing B/L LE's R groin + hematoma  s/p compression  soft non tender no signs of active bleed       TELEMETRY: SR   	    ECG:  	  RADIOLOGY:       DIAGNOSTIC TESTING:  [ ] Echocardiogram:  [ ]  Catheterization: LVEDP 24, ostial Ramus ; SVG to Ramus down, SVG to PDA down, LIMA to LAD patent;   [ ] Stress Test:    OTHER: 	      ASSESSMENT/PLAN: 	71y Female with HTN, hyperlipidemia, DM-II, known CAD with 3V CABG (LIMA to LAD, SVG to OM, SVG to PDA) at [facility] 2014 who c/o chest pain with subsequent abnormal stress test.     s/p CATH w/ LVEDP 24, ostial Ramus ; SVG to Ramus down, SVG to PDA down, LIMA to LAD patent;   US R groin pending   monitor H/H   plan for PCI to Ramus next week   medicine in pt appreciated   renal input appreciated  will need  mucomyst 1200 bid for 4 doses start 1 day prior to CATH, last dose after test, sodium bicarb 150meq/L in sterile water at 200cc/hr x 1 hr 1hr prior to CATH then 75cc/hr x 6 hr after.

## 2017-10-14 NOTE — CHART NOTE - NSCHARTNOTEFT_GEN_A_CORE
Patient s/p hematoma post CAth on 10/13.  She is to be evaluated again today.  very mild tenderness, hematoma size did not increase, Ecchymosis did not increase in size.  Pulse present @ DP.  Continue to monitor.    Korin FIELD

## 2017-10-15 DIAGNOSIS — E87.6 HYPOKALEMIA: ICD-10-CM

## 2017-10-15 LAB
ALBUMIN SERPL ELPH-MCNC: 3.5 G/DL — SIGNIFICANT CHANGE UP (ref 3.3–5)
ALP SERPL-CCNC: 65 U/L — SIGNIFICANT CHANGE UP (ref 40–120)
ALT FLD-CCNC: 27 U/L — SIGNIFICANT CHANGE UP (ref 4–33)
AST SERPL-CCNC: 39 U/L — HIGH (ref 4–32)
BILIRUB SERPL-MCNC: 0.3 MG/DL — SIGNIFICANT CHANGE UP (ref 0.2–1.2)
BUN SERPL-MCNC: 35 MG/DL — HIGH (ref 7–23)
CALCIUM SERPL-MCNC: 9.2 MG/DL — SIGNIFICANT CHANGE UP (ref 8.4–10.5)
CHLORIDE SERPL-SCNC: 100 MMOL/L — SIGNIFICANT CHANGE UP (ref 98–107)
CO2 SERPL-SCNC: 22 MMOL/L — SIGNIFICANT CHANGE UP (ref 22–31)
CREAT SERPL-MCNC: 1.71 MG/DL — HIGH (ref 0.5–1.3)
GLUCOSE SERPL-MCNC: 181 MG/DL — HIGH (ref 70–99)
HCT VFR BLD CALC: 31 % — LOW (ref 34.5–45)
HGB BLD-MCNC: 9.7 G/DL — LOW (ref 11.5–15.5)
MCHC RBC-ENTMCNC: 28 PG — SIGNIFICANT CHANGE UP (ref 27–34)
MCHC RBC-ENTMCNC: 31.3 % — LOW (ref 32–36)
MCV RBC AUTO: 89.6 FL — SIGNIFICANT CHANGE UP (ref 80–100)
NRBC # FLD: 0 — SIGNIFICANT CHANGE UP
PLATELET # BLD AUTO: 258 K/UL — SIGNIFICANT CHANGE UP (ref 150–400)
PMV BLD: 9.5 FL — SIGNIFICANT CHANGE UP (ref 7–13)
POTASSIUM SERPL-MCNC: 3.3 MMOL/L — LOW (ref 3.5–5.3)
POTASSIUM SERPL-SCNC: 3.3 MMOL/L — LOW (ref 3.5–5.3)
PROT SERPL-MCNC: 6.9 G/DL — SIGNIFICANT CHANGE UP (ref 6–8.3)
RBC # BLD: 3.46 M/UL — LOW (ref 3.8–5.2)
RBC # FLD: 14.1 % — SIGNIFICANT CHANGE UP (ref 10.3–14.5)
SODIUM SERPL-SCNC: 141 MMOL/L — SIGNIFICANT CHANGE UP (ref 135–145)
WBC # BLD: 12.18 K/UL — HIGH (ref 3.8–10.5)
WBC # FLD AUTO: 12.18 K/UL — HIGH (ref 3.8–10.5)

## 2017-10-15 RX ORDER — ACETAMINOPHEN 500 MG
650 TABLET ORAL ONCE
Qty: 0 | Refills: 0 | Status: COMPLETED | OUTPATIENT
Start: 2017-10-15 | End: 2017-10-15

## 2017-10-15 RX ORDER — POTASSIUM CHLORIDE 20 MEQ
20 PACKET (EA) ORAL ONCE
Qty: 0 | Refills: 0 | Status: COMPLETED | OUTPATIENT
Start: 2017-10-15 | End: 2017-10-15

## 2017-10-15 RX ADMIN — Medication 2: at 09:52

## 2017-10-15 RX ADMIN — Medication 4: at 14:16

## 2017-10-15 RX ADMIN — Medication 50 MICROGRAM(S): at 05:25

## 2017-10-15 RX ADMIN — Medication 20 MILLIEQUIVALENT(S): at 10:39

## 2017-10-15 RX ADMIN — Medication 650 MILLIGRAM(S): at 11:19

## 2017-10-15 RX ADMIN — Medication 1200 MILLIGRAM(S): at 18:14

## 2017-10-15 RX ADMIN — Medication 24 UNIT(S): at 09:52

## 2017-10-15 RX ADMIN — SODIUM CHLORIDE 3 MILLILITER(S): 9 INJECTION INTRAMUSCULAR; INTRAVENOUS; SUBCUTANEOUS at 05:25

## 2017-10-15 RX ADMIN — Medication 20 MILLIGRAM(S): at 05:25

## 2017-10-15 RX ADMIN — Medication 25 MILLIGRAM(S): at 18:14

## 2017-10-15 RX ADMIN — Medication 81 MILLIGRAM(S): at 11:18

## 2017-10-15 RX ADMIN — Medication 1200 MILLIGRAM(S): at 05:25

## 2017-10-15 RX ADMIN — SODIUM CHLORIDE 3 MILLILITER(S): 9 INJECTION INTRAMUSCULAR; INTRAVENOUS; SUBCUTANEOUS at 21:53

## 2017-10-15 RX ADMIN — Medication 24 UNIT(S): at 14:16

## 2017-10-15 RX ADMIN — SODIUM CHLORIDE 3 MILLILITER(S): 9 INJECTION INTRAMUSCULAR; INTRAVENOUS; SUBCUTANEOUS at 18:08

## 2017-10-15 RX ADMIN — ATORVASTATIN CALCIUM 40 MILLIGRAM(S): 80 TABLET, FILM COATED ORAL at 21:53

## 2017-10-15 RX ADMIN — Medication 3: at 18:13

## 2017-10-15 RX ADMIN — INSULIN GLARGINE 65 UNIT(S): 100 INJECTION, SOLUTION SUBCUTANEOUS at 21:53

## 2017-10-15 RX ADMIN — FAMOTIDINE 20 MILLIGRAM(S): 10 INJECTION INTRAVENOUS at 11:18

## 2017-10-15 RX ADMIN — Medication 650 MILLIGRAM(S): at 10:38

## 2017-10-15 RX ADMIN — MONTELUKAST 10 MILLIGRAM(S): 4 TABLET, CHEWABLE ORAL at 21:53

## 2017-10-15 RX ADMIN — Medication 25 MILLIGRAM(S): at 05:25

## 2017-10-15 RX ADMIN — Medication 24 UNIT(S): at 18:14

## 2017-10-15 NOTE — PROGRESS NOTE ADULT - SUBJECTIVE AND OBJECTIVE BOX
Patient seen and examined at bedside. No chest pain/sob    VITALS:  T(F): 97.9 (10-15-17 @ 05:23), Max: 98.4 (10-14-17 @ 15:16)  HR: 86 (10-15-17 @ 05:23)  BP: 129/69 (10-15-17 @ 05:23)  RR: 18 (10-15-17 @ 05:23)  SpO2: 96% (10-15-17 @ 05:23)  Wt(kg): --        PHYSICAL EXAM:  Constitutional: NAD  Neck: No JVD  Respiratory: CTAB, no wheezes, rales or rhonchi  Cardiovascular: S1, S2, RRR  Gastrointestinal: BS+, soft, NT/ND  Extremities: No peripheral edema    Hospital Medications:   MEDICATIONS  (STANDING):  acetylcysteine  Oral Solution 1200 milliGRAM(s) Oral two times a day  aspirin enteric coated 81 milliGRAM(s) Oral daily  atorvastatin 40 milliGRAM(s) Oral at bedtime  dextrose 5%. 1000 milliLiter(s) (50 mL/Hr) IV Continuous <Continuous>  dextrose 50% Injectable 12.5 Gram(s) IV Push once  dextrose 50% Injectable 25 Gram(s) IV Push once  dextrose 50% Injectable 25 Gram(s) IV Push once  famotidine    Tablet 20 milliGRAM(s) Oral daily  furosemide    Tablet 20 milliGRAM(s) Oral daily  influenza   Vaccine 0.5 milliLiter(s) IntraMuscular once  insulin glargine Injectable (LANTUS) 65 Unit(s) SubCutaneous at bedtime  insulin lispro (HumaLOG) corrective regimen sliding scale   SubCutaneous three times a day before meals  insulin lispro Injectable (HumaLOG) 24 Unit(s) SubCutaneous three times a day with meals  levothyroxine 50 MICROGram(s) Oral daily  metoprolol 25 milliGRAM(s) Oral two times a day  montelukast 10 milliGRAM(s) Oral at bedtime  sodium bicarbonate  Infusion 0.23 mEq/kG/Hr (75 mL/Hr) IV Continuous <Continuous>  sodium chloride 0.9% lock flush 3 milliLiter(s) IV Push every 8 hours  sodium chloride 0.9%. 500 milliLiter(s) (100 mL/Hr) IV Continuous <Continuous>      LABS:  10-15    141  |  100  |  35<H>  ----------------------------<  181<H>  3.3<L>   |  22  |  1.71<H>    Ca    9.2      15 Oct 2017 05:59  Phos  4.8     10-14  Mg     1.8     10-14    TPro  6.9  /  Alb  3.5  /  TBili  0.3  /  DBili      /  AST  39<H>  /  ALT  27  /  AlkPhos  65  10-15    Creatinine Trend: 1.71 <--, 1.49 <--, 1.53 <--  Albumin, Serum: 3.5 g/dL (10-15 @ 05:59)                              9.7    12.18 )-----------( 258      ( 15 Oct 2017 05:59 )             31.0     Urine Studies:          HbA1c 10.4      [10-13-17 @ 08:45]        RADIOLOGY & ADDITIONAL STUDIES:

## 2017-10-15 NOTE — PROGRESS NOTE ADULT - SUBJECTIVE AND OBJECTIVE BOX
Subjective:    Denies chest pain / shortness of breath       Cardiovascular: Normal S1 S2,  RRR   No JVD, 1/6 YUSUF murmur, Peripheral pulses palpable 2+ bilaterally  Respiratory: Lungs clear to auscultation, normal effort 	  Gastrointestinal:  Soft, Non-tender, + BS	  Extremities no edema, cyanosis, clubbing B/L LE's R groin + hematoma  s/p compression  soft non tender no signs of active bleed       TELEMETRY: SR   	    ECG:  	  RADIOLOGY:       DIAGNOSTIC TESTING:  [ ] Echocardiogram:  [ ]  Catheterization: LVEDP 24, ostial Ramus ; SVG to Ramus down, SVG to PDA down, LIMA to LAD patent;   [ ] Stress Test:    OTHER: 	      ASSESSMENT/PLAN: 71y Female with HTN, hyperlipidemia, DM-II, known CAD with 3V CABG (LIMA to LAD, SVG to OM, SVG to PDA) at [facility] 2014 who c/o chest pain with subsequent abnormal stress test.     s/p CATH w/ LVEDP 24, ostial Ramus ; SVG to Ramus down, SVG to PDA down, LIMA to LAD patent;   US R groin pending   monitor H/H   plan for PCI to Ramus next week   medicine in pt appreciated   renal input appreciated  will need  mucomyst 1200 bid for 4 doses start 1 day prior to CATH, last dose after test, sodium bicarb 150meq/L in sterile water at 200cc/hr x 1 hr 1hr prior to CATH then 75cc/hr x 6 hr after. Subjective:    Denies chest pain / shortness of breath     MEDICATIONS  (STANDING):  acetylcysteine  Oral Solution 1200 milliGRAM(s) Oral two times a day  aspirin enteric coated 81 milliGRAM(s) Oral daily  atorvastatin 40 milliGRAM(s) Oral at bedtime  dextrose 5%. 1000 milliLiter(s) (50 mL/Hr) IV Continuous <Continuous>  dextrose 50% Injectable 12.5 Gram(s) IV Push once  dextrose 50% Injectable 25 Gram(s) IV Push once  dextrose 50% Injectable 25 Gram(s) IV Push once  famotidine    Tablet 20 milliGRAM(s) Oral daily  furosemide    Tablet 20 milliGRAM(s) Oral daily  influenza   Vaccine 0.5 milliLiter(s) IntraMuscular once  insulin glargine Injectable (LANTUS) 65 Unit(s) SubCutaneous at bedtime  insulin lispro (HumaLOG) corrective regimen sliding scale   SubCutaneous three times a day before meals  insulin lispro Injectable (HumaLOG) 24 Unit(s) SubCutaneous three times a day with meals  levothyroxine 50 MICROGram(s) Oral daily  metoprolol 25 milliGRAM(s) Oral two times a day  montelukast 10 milliGRAM(s) Oral at bedtime  sodium bicarbonate  Infusion 0.23 mEq/kG/Hr (75 mL/Hr) IV Continuous <Continuous>  sodium chloride 0.9% lock flush 3 milliLiter(s) IV Push every 8 hours  sodium chloride 0.9%. 500 milliLiter(s) (100 mL/Hr) IV Continuous <Continuous>    MEDICATIONS  (PRN):  dextrose Gel 1 Dose(s) Oral once PRN Blood Glucose LESS THAN 70 milliGRAM(s)/deciliter  glucagon  Injectable 1 milliGRAM(s) IntraMuscular once PRN Glucose LESS THAN 70 milligrams/deciliter      LABS:                        9.7    12.18 )-----------( 258      ( 15 Oct 2017 05:59 )             31.0     Hemoglobin: 9.7 g/dL (10-15 @ 05:59)  Hemoglobin: 10.9 g/dL (10-14 @ 05:47)  Hemoglobin: 10.9 g/dL (10-13 @ 20:10)  Hemoglobin: 11.4 g/dL (10-13 @ 18:44)  Hemoglobin: 13.1 g/dL (10-13 @ 08:45)    10-15    141  |  100  |  35<H>  ----------------------------<  181<H>  3.3<L>   |  22  |  1.71<H>    Ca    9.2      15 Oct 2017 05:59  Phos  4.8     10-14  Mg     1.8     10-14    TPro  6.9  /  Alb  3.5  /  TBili  0.3  /  DBili  x   /  AST  39<H>  /  ALT  27  /  AlkPhos  65  10-15    Creatinine Trend: 1.71<--, 1.49<--, 1.53<--           PHYSICAL EXAM  Vital Signs Last 24 Hrs  T(C): 36.7 (15 Oct 2017 14:47), Max: 36.9 (14 Oct 2017 15:16)  T(F): 98 (15 Oct 2017 14:47), Max: 98.4 (14 Oct 2017 15:16)  HR: 78 (15 Oct 2017 14:47) (65 - 86)  BP: 120/58 (15 Oct 2017 14:47) (120/58 - 136/62)  BP(mean): --  RR: 18 (15 Oct 2017 14:47) (18 - 18)  SpO2: 95% (15 Oct 2017 14:47) (95% - 96%)        Cardiovascular: Normal S1 S2,  RRR   No JVD, 1/6 YUSUF murmur, Peripheral pulses palpable 2+ bilaterally  Respiratory: Lungs clear to auscultation, normal effort 	  Gastrointestinal:  Soft, Non-tender, + BS	  Extremities no edema, cyanosis, clubbing B/L LE's R groin + hematoma  s/p compression  soft non tender no signs of active bleed       TELEMETRY: SR   	    ECG:  	  RADIOLOGY:       DIAGNOSTIC TESTING:  [ ] Echocardiogram:  [ ]  Catheterization: LVEDP 24, ostial Ramus ; SVG to Ramus down, SVG to PDA down, LIMA to LAD patent;   [ ] Stress Test:    OTHER: 	      ASSESSMENT/PLAN: 71y Female with HTN, hyperlipidemia, DM-II, known CAD with 3V CABG (LIMA to LAD, SVG to OM, SVG to PDA) at [facility] 2014 who c/o chest pain with subsequent abnormal stress test.     s/p CATH w/ LVEDP 24, ostial Ramus ; SVG to Ramus down, SVG to PDA down, LIMA to LAD patent;   US R groin pending   monitor H/H   plan for PCI to Ramus next week   medicine in pt appreciated   renal input appreciated  will need  mucomyst 1200 bid for 4 doses start 1 day prior to CATH, last dose after test, sodium bicarb 150meq/L in sterile water at 200cc/hr x 1 hr 1hr prior to CATH then 75cc/hr x 6 hr after.

## 2017-10-15 NOTE — PROGRESS NOTE ADULT - SUBJECTIVE AND OBJECTIVE BOX
Patient is a 71y old  Female who presents with a chief complaint of     INTERVAL HPI/OVERNIGHT EVENTS:  T(C): 36.7 (10-15-17 @ 14:47), Max: 36.8 (10-14-17 @ 22:23)  HR: 78 (10-15-17 @ 14:47) (65 - 86)  BP: 120/58 (10-15-17 @ 14:47) (120/58 - 136/62)  RR: 18 (10-15-17 @ 14:47) (18 - 18)  SpO2: 95% (10-15-17 @ 14:47) (95% - 96%)  Wt(kg): --  I&O's Summary      LABS:                        9.7    12.18 )-----------( 258      ( 15 Oct 2017 05:59 )             31.0     10-15    141  |  100  |  35<H>  ----------------------------<  181<H>  3.3<L>   |  22  |  1.71<H>    Ca    9.2      15 Oct 2017 05:59  Phos  4.8     10-14  Mg     1.8     10-14    TPro  6.9  /  Alb  3.5  /  TBili  0.3  /  DBili  x   /  AST  39<H>  /  ALT  27  /  AlkPhos  65  10-15        CAPILLARY BLOOD GLUCOSE      POCT Blood Glucose.: 306 mg/dL (15 Oct 2017 13:08)  POCT Blood Glucose.: 218 mg/dL (15 Oct 2017 08:35)  POCT Blood Glucose.: 287 mg/dL (14 Oct 2017 21:23)  POCT Blood Glucose.: 158 mg/dL (14 Oct 2017 17:55)            MEDICATIONS  (STANDING):  acetylcysteine  Oral Solution 1200 milliGRAM(s) Oral two times a day  aspirin enteric coated 81 milliGRAM(s) Oral daily  atorvastatin 40 milliGRAM(s) Oral at bedtime  dextrose 5%. 1000 milliLiter(s) (50 mL/Hr) IV Continuous <Continuous>  dextrose 50% Injectable 12.5 Gram(s) IV Push once  dextrose 50% Injectable 25 Gram(s) IV Push once  dextrose 50% Injectable 25 Gram(s) IV Push once  famotidine    Tablet 20 milliGRAM(s) Oral daily  furosemide    Tablet 20 milliGRAM(s) Oral daily  influenza   Vaccine 0.5 milliLiter(s) IntraMuscular once  insulin glargine Injectable (LANTUS) 65 Unit(s) SubCutaneous at bedtime  insulin lispro (HumaLOG) corrective regimen sliding scale   SubCutaneous three times a day before meals  insulin lispro Injectable (HumaLOG) 24 Unit(s) SubCutaneous three times a day with meals  levothyroxine 50 MICROGram(s) Oral daily  metoprolol 25 milliGRAM(s) Oral two times a day  montelukast 10 milliGRAM(s) Oral at bedtime  sodium bicarbonate  Infusion 0.23 mEq/kG/Hr (75 mL/Hr) IV Continuous <Continuous>  sodium chloride 0.9% lock flush 3 milliLiter(s) IV Push every 8 hours  sodium chloride 0.9%. 500 milliLiter(s) (100 mL/Hr) IV Continuous <Continuous>    MEDICATIONS  (PRN):  dextrose Gel 1 Dose(s) Oral once PRN Blood Glucose LESS THAN 70 milliGRAM(s)/deciliter  glucagon  Injectable 1 milliGRAM(s) IntraMuscular once PRN Glucose LESS THAN 70 milligrams/deciliter          PHYSICAL EXAM:  GENERAL: NAD, well-groomed, well-developed  HEAD:  Atraumatic, Normocephalic  CHEST/LUNG: Clear to percussion bilaterally; No rales, rhonchi, wheezing, or rubs  HEART: Regular rate and rhythm; No murmurs, rubs, or gallops  ABDOMEN: Soft, Nontender, Nondistended; Bowel sounds present  EXTREMITIES:  2+ Peripheral Pulses, No clubbing, cyanosis, or edema  LYMPH: No lymphadenopathy noted  SKIN: No rashes or lesions    Care Discussed with Consultants/Other Providers [+ ] YES  [ ] NO

## 2017-10-16 LAB
ALBUMIN SERPL ELPH-MCNC: 3.4 G/DL — SIGNIFICANT CHANGE UP (ref 3.3–5)
ALP SERPL-CCNC: 67 U/L — SIGNIFICANT CHANGE UP (ref 40–120)
ALT FLD-CCNC: 33 U/L — SIGNIFICANT CHANGE UP (ref 4–33)
AST SERPL-CCNC: 47 U/L — HIGH (ref 4–32)
BILIRUB SERPL-MCNC: 0.3 MG/DL — SIGNIFICANT CHANGE UP (ref 0.2–1.2)
BUN SERPL-MCNC: 37 MG/DL — HIGH (ref 7–23)
CALCIUM SERPL-MCNC: 9.1 MG/DL — SIGNIFICANT CHANGE UP (ref 8.4–10.5)
CHLORIDE SERPL-SCNC: 102 MMOL/L — SIGNIFICANT CHANGE UP (ref 98–107)
CO2 SERPL-SCNC: 21 MMOL/L — LOW (ref 22–31)
CREAT SERPL-MCNC: 1.81 MG/DL — HIGH (ref 0.5–1.3)
GLUCOSE SERPL-MCNC: 190 MG/DL — HIGH (ref 70–99)
HCT VFR BLD CALC: 31.3 % — LOW (ref 34.5–45)
HGB BLD-MCNC: 9.9 G/DL — LOW (ref 11.5–15.5)
MCHC RBC-ENTMCNC: 28.4 PG — SIGNIFICANT CHANGE UP (ref 27–34)
MCHC RBC-ENTMCNC: 31.6 % — LOW (ref 32–36)
MCV RBC AUTO: 89.7 FL — SIGNIFICANT CHANGE UP (ref 80–100)
NRBC # FLD: 0 — SIGNIFICANT CHANGE UP
PLATELET # BLD AUTO: 249 K/UL — SIGNIFICANT CHANGE UP (ref 150–400)
PMV BLD: 9.5 FL — SIGNIFICANT CHANGE UP (ref 7–13)
POTASSIUM SERPL-MCNC: 3.9 MMOL/L — SIGNIFICANT CHANGE UP (ref 3.5–5.3)
POTASSIUM SERPL-SCNC: 3.9 MMOL/L — SIGNIFICANT CHANGE UP (ref 3.5–5.3)
PROT SERPL-MCNC: 6.9 G/DL — SIGNIFICANT CHANGE UP (ref 6–8.3)
RBC # BLD: 3.49 M/UL — LOW (ref 3.8–5.2)
RBC # FLD: 13.9 % — SIGNIFICANT CHANGE UP (ref 10.3–14.5)
SODIUM SERPL-SCNC: 141 MMOL/L — SIGNIFICANT CHANGE UP (ref 135–145)
WBC # BLD: 12.06 K/UL — HIGH (ref 3.8–10.5)
WBC # FLD AUTO: 12.06 K/UL — HIGH (ref 3.8–10.5)

## 2017-10-16 PROCEDURE — 93926 LOWER EXTREMITY STUDY: CPT | Mod: 26,LT

## 2017-10-16 RX ORDER — SODIUM BICARBONATE 1 MEQ/ML
0.61 SYRINGE (ML) INTRAVENOUS
Qty: 150 | Refills: 0 | Status: DISCONTINUED | OUTPATIENT
Start: 2017-10-16 | End: 2017-10-19

## 2017-10-16 RX ORDER — ACETAMINOPHEN 500 MG
650 TABLET ORAL EVERY 6 HOURS
Qty: 0 | Refills: 0 | Status: DISCONTINUED | OUTPATIENT
Start: 2017-10-16 | End: 2017-10-20

## 2017-10-16 RX ORDER — SODIUM BICARBONATE 1 MEQ/ML
0.23 SYRINGE (ML) INTRAVENOUS
Qty: 150 | Refills: 0 | Status: DISCONTINUED | OUTPATIENT
Start: 2017-10-16 | End: 2017-10-19

## 2017-10-16 RX ORDER — SENNA PLUS 8.6 MG/1
2 TABLET ORAL AT BEDTIME
Qty: 0 | Refills: 0 | Status: DISCONTINUED | OUTPATIENT
Start: 2017-10-16 | End: 2017-10-20

## 2017-10-16 RX ORDER — DOCUSATE SODIUM 100 MG
100 CAPSULE ORAL DAILY
Qty: 0 | Refills: 0 | Status: DISCONTINUED | OUTPATIENT
Start: 2017-10-16 | End: 2017-10-20

## 2017-10-16 RX ADMIN — Medication 1200 MILLIGRAM(S): at 05:31

## 2017-10-16 RX ADMIN — SODIUM CHLORIDE 3 MILLILITER(S): 9 INJECTION INTRAMUSCULAR; INTRAVENOUS; SUBCUTANEOUS at 14:35

## 2017-10-16 RX ADMIN — Medication 24 UNIT(S): at 14:37

## 2017-10-16 RX ADMIN — FAMOTIDINE 20 MILLIGRAM(S): 10 INJECTION INTRAVENOUS at 11:56

## 2017-10-16 RX ADMIN — Medication 650 MILLIGRAM(S): at 11:56

## 2017-10-16 RX ADMIN — SODIUM CHLORIDE 3 MILLILITER(S): 9 INJECTION INTRAMUSCULAR; INTRAVENOUS; SUBCUTANEOUS at 05:24

## 2017-10-16 RX ADMIN — Medication 650 MILLIGRAM(S): at 01:10

## 2017-10-16 RX ADMIN — Medication 24 UNIT(S): at 17:54

## 2017-10-16 RX ADMIN — Medication 50 MICROGRAM(S): at 05:31

## 2017-10-16 RX ADMIN — Medication 24 UNIT(S): at 10:30

## 2017-10-16 RX ADMIN — Medication 25 MILLIGRAM(S): at 05:31

## 2017-10-16 RX ADMIN — Medication 1200 MILLIGRAM(S): at 17:54

## 2017-10-16 RX ADMIN — ATORVASTATIN CALCIUM 40 MILLIGRAM(S): 80 TABLET, FILM COATED ORAL at 21:11

## 2017-10-16 RX ADMIN — MONTELUKAST 10 MILLIGRAM(S): 4 TABLET, CHEWABLE ORAL at 21:11

## 2017-10-16 RX ADMIN — Medication: at 17:56

## 2017-10-16 RX ADMIN — Medication 20 MILLIGRAM(S): at 05:31

## 2017-10-16 RX ADMIN — INSULIN GLARGINE 65 UNIT(S): 100 INJECTION, SOLUTION SUBCUTANEOUS at 22:07

## 2017-10-16 RX ADMIN — SODIUM CHLORIDE 3 MILLILITER(S): 9 INJECTION INTRAMUSCULAR; INTRAVENOUS; SUBCUTANEOUS at 21:11

## 2017-10-16 RX ADMIN — SENNA PLUS 2 TABLET(S): 8.6 TABLET ORAL at 21:11

## 2017-10-16 RX ADMIN — Medication 1: at 10:32

## 2017-10-16 RX ADMIN — Medication 81 MILLIGRAM(S): at 11:56

## 2017-10-16 RX ADMIN — Medication 25 MILLIGRAM(S): at 17:54

## 2017-10-16 RX ADMIN — Medication 650 MILLIGRAM(S): at 02:05

## 2017-10-16 RX ADMIN — Medication 650 MILLIGRAM(S): at 12:56

## 2017-10-16 NOTE — PROGRESS NOTE ADULT - SUBJECTIVE AND OBJECTIVE BOX
NAD    INTERVAL HPI/OVERNIGHT EVENTS:  T(C): 36.7 (10-16-17 @ 14:19), Max: 37.1 (10-15-17 @ 21:53)  HR: 72 (10-16-17 @ 14:19) (72 - 77)  BP: 98/55 (10-16-17 @ 14:19) (98/55 - 134/64)  RR: 18 (10-16-17 @ 14:19) (18 - 18)  SpO2: 100% (10-16-17 @ 14:19) (97% - 100%)  Wt(kg): --  I&O's Summary      LABS:                        9.9    12.06 )-----------( 249      ( 16 Oct 2017 05:13 )             31.3     10-16    141  |  102  |  37<H>  ----------------------------<  190<H>  3.9   |  21<L>  |  1.81<H>    Ca    9.1      16 Oct 2017 05:13    TPro  6.9  /  Alb  3.4  /  TBili  0.3  /  DBili  x   /  AST  47<H>  /  ALT  33  /  AlkPhos  67  10-16        CAPILLARY BLOOD GLUCOSE  134 (16 Oct 2017 14:34)      POCT Blood Glucose.: 96 mg/dL (16 Oct 2017 12:28)  POCT Blood Glucose.: 176 mg/dL (16 Oct 2017 08:36)  POCT Blood Glucose.: 269 mg/dL (15 Oct 2017 21:40)  POCT Blood Glucose.: 234 mg/dL (15 Oct 2017 16:57)            MEDICATIONS  (STANDING):  acetylcysteine  Oral Solution 1200 milliGRAM(s) Oral two times a day  aspirin enteric coated 81 milliGRAM(s) Oral daily  atorvastatin 40 milliGRAM(s) Oral at bedtime  dextrose 5%. 1000 milliLiter(s) (50 mL/Hr) IV Continuous <Continuous>  dextrose 50% Injectable 12.5 Gram(s) IV Push once  dextrose 50% Injectable 25 Gram(s) IV Push once  dextrose 50% Injectable 25 Gram(s) IV Push once  docusate sodium 100 milliGRAM(s) Oral daily  famotidine    Tablet 20 milliGRAM(s) Oral daily  furosemide    Tablet 20 milliGRAM(s) Oral daily  influenza   Vaccine 0.5 milliLiter(s) IntraMuscular once  insulin glargine Injectable (LANTUS) 65 Unit(s) SubCutaneous at bedtime  insulin lispro (HumaLOG) corrective regimen sliding scale   SubCutaneous three times a day before meals  insulin lispro Injectable (HumaLOG) 24 Unit(s) SubCutaneous three times a day with meals  levothyroxine 50 MICROGram(s) Oral daily  metoprolol 25 milliGRAM(s) Oral two times a day  montelukast 10 milliGRAM(s) Oral at bedtime  senna 2 Tablet(s) Oral at bedtime  sodium bicarbonate  Infusion 0.23 mEq/kG/Hr (75 mL/Hr) IV Continuous <Continuous>  sodium bicarbonate  Infusion 0.612 mEq/kG/Hr (200 mL/Hr) IV Continuous <Continuous>  sodium bicarbonate  Infusion 0.23 mEq/kG/Hr (75 mL/Hr) IV Continuous <Continuous>  sodium chloride 0.9% lock flush 3 milliLiter(s) IV Push every 8 hours  sodium chloride 0.9%. 500 milliLiter(s) (100 mL/Hr) IV Continuous <Continuous>    MEDICATIONS  (PRN):  acetaminophen   Tablet. 650 milliGRAM(s) Oral every 6 hours PRN mild, moderate, severe pain  dextrose Gel 1 Dose(s) Oral once PRN Blood Glucose LESS THAN 70 milliGRAM(s)/deciliter  glucagon  Injectable 1 milliGRAM(s) IntraMuscular once PRN Glucose LESS THAN 70 milligrams/deciliter          PHYSICAL EXAM:  GENERAL: NAD, well-groomed, well-developed  HEAD:  Atraumatic, Normocephalic  CHEST/LUNG: Clear to percussion bilaterally; No rales, rhonchi, wheezing, or rubs  HEART: Regular rate and rhythm; No murmurs, rubs, or gallops  ABDOMEN: Soft, Nontender, Nondistended; Bowel sounds present  EXTREMITIES:  2+ Peripheral Pulses, No clubbing, cyanosis, or edema  LYMPH: No lymphadenopathy noted  SKIN: No rashes or lesions    Care Discussed with Consultants/Other Providers [ ] YES  [ ] NO

## 2017-10-16 NOTE — PROGRESS NOTE ADULT - SUBJECTIVE AND OBJECTIVE BOX
Subjective: Denies chest pain / shortness of breath     MEDICATIONS  (STANDING):  acetylcysteine  Oral Solution 1200 milliGRAM(s) Oral two times a day  aspirin enteric coated 81 milliGRAM(s) Oral daily  atorvastatin 40 milliGRAM(s) Oral at bedtime  famotidine    Tablet 20 milliGRAM(s) Oral daily  furosemide    Tablet 20 milliGRAM(s) Oral daily  influenza   Vaccine 0.5 milliLiter(s) IntraMuscular once  insulin glargine Injectable (LANTUS) 65 Unit(s) SubCutaneous at bedtime  insulin lispro (HumaLOG) corrective regimen sliding scale   SubCutaneous three times a day before meals  insulin lispro Injectable (HumaLOG) 24 Unit(s) SubCutaneous three times a day with meals  levothyroxine 50 MICROGram(s) Oral daily  metoprolol 25 milliGRAM(s) Oral two times a day  montelukast 10 milliGRAM(s) Oral at bedtime  sodium bicarbonate  Infusion 0.612 mEq/kG/Hr (200 mL/Hr) IV Continuous <Continuous>  sodium bicarbonate  Infusion 0.23 mEq/kG/Hr (75 mL/Hr) IV Continuous <Continuous>  sodium bicarbonate  Infusion 0.23 mEq/kG/Hr (75 mL/Hr) IV Continuous <Continuous>  sodium chloride 0.9% lock flush 3 milliLiter(s) IV Push every 8 hours  sodium chloride 0.9%. 500 milliLiter(s) (100 mL/Hr) IV Continuous <Continuous>    LABS:                        9.9    12.06 )-----------( 249      ( 16 Oct 2017 05:13 )             31.3     141  |  102  |  37<H>  ----------------------------<  190<H>  3.9   |  21<L>  |  1.81<H>    Ca    9.1      16 Oct 2017 05:13    TPro  6.9  /  Alb  3.4  /  TBili  0.3  /  DBili  x   /  AST  47<H>  /  ALT  33  /  AlkPhos  67  10-16    Creatinine Trend: 1.81<--, 1.71<--, 1.49<--, 1.53<--    PHYSICAL EXAM  Vital Signs Last 24 Hrs  T(C): 36.6 (16 Oct 2017 05:33), Max: 37.1 (15 Oct 2017 21:53)  T(F): 97.8 (16 Oct 2017 05:33), Max: 98.8 (15 Oct 2017 21:53)  HR: 74 (16 Oct 2017 05:33) (74 - 78)  BP: 134/64 (16 Oct 2017 05:33) (119/54 - 134/64)  RR: 18 (16 Oct 2017 05:33) (18 - 18)  SpO2: 98% (16 Oct 2017 05:33) (95% - 98%)    Cardiovascular: Normal S1 S2,  RRR   No JVD, 1/6 YUSUF murmur, Peripheral pulses palpable 2+ bilaterally  Respiratory: Lungs clear to auscultation, normal effort 	  Gastrointestinal:  Soft, Non-tender, + BS	  Extremities no edema, cyanosis, clubbing B/L LE's R groin + hematoma  s/p compression  soft non tender no signs of active bleed     TELEMETRY: SR     DIAGNOSTIC TESTING:    Catheterization: LVEDP 24, ostial Ramus ; SVG to Ramus down, SVG to PDA down, LIMA to LAD patent;     < from: VA Duplex Arterial Lower Ext, Right. (10.16.17 @ 08:21) >  Summary/Impressions:  No pseudoaneurysm.  ------------------------------------------------------------------------  Confirmed on  10/16/2017 - 11:33 AM by Ajith Louise MD,    < end of copied text >      ASSESSMENT/PLAN: 71y Female with HTN, hyperlipidemia, DM-II, known CAD with 3V CABG (LIMA to LAD, SVG to OM, SVG to PDA) at [facility] 2014 who c/o chest pain with subsequent abnormal stress test.     --s/p CATH w/ LVEDP 24, ostial Ramus ; SVG to Ramus down, SVG to PDA down, LIMA to LAD patent;   ---RLE doppler--NO pseudoaneurysm  --plan for PCI to Ramus next week once optimized  --hold on cath today given rising creatinine  renal input appreciated  will need  mucomyst 1200 bid for 4 doses start 1 day prior to CATH, last dose after test, sodium bicarb 150meq/L in sterile water at 200cc/hr x 1 hr 1hr prior to CATH then 75cc/hr x 6 hr after.    Juliane Fitch PA-C  [facility]  [address], [address]   [place] [address]  office [phone]  pager [phone]

## 2017-10-16 NOTE — PROGRESS NOTE ADULT - SUBJECTIVE AND OBJECTIVE BOX
Dr. Muhammad (Nephrology)  Office [phone]  Cell [phone]  [facility]  Cell [phone]      Patient is a 71y old  Female who presents with a chief complaint of     Patient seen and examined at bedside. No chest pain/sob    VITALS:  T(F): 97.8 (10-16-17 @ 05:33), Max: 98.8 (10-15-17 @ 21:53)  HR: 74 (10-16-17 @ 05:33)  BP: 134/64 (10-16-17 @ 05:33)  RR: 18 (10-16-17 @ 05:33)  SpO2: 98% (10-16-17 @ 05:33)  Wt(kg): --        PHYSICAL EXAM:  Constitutional: NAD  Neck: No JVD  Respiratory: CTAB, no wheezes, rales or rhonchi  Cardiovascular: S1, S2, RRR  Gastrointestinal: BS+, soft, NT/ND  Extremities: No peripheral edema    Hospital Medications:   MEDICATIONS  (STANDING):  acetylcysteine  Oral Solution 1200 milliGRAM(s) Oral two times a day  aspirin enteric coated 81 milliGRAM(s) Oral daily  atorvastatin 40 milliGRAM(s) Oral at bedtime  dextrose 5%. 1000 milliLiter(s) (50 mL/Hr) IV Continuous <Continuous>  dextrose 50% Injectable 12.5 Gram(s) IV Push once  dextrose 50% Injectable 25 Gram(s) IV Push once  dextrose 50% Injectable 25 Gram(s) IV Push once  famotidine    Tablet 20 milliGRAM(s) Oral daily  furosemide    Tablet 20 milliGRAM(s) Oral daily  influenza   Vaccine 0.5 milliLiter(s) IntraMuscular once  insulin glargine Injectable (LANTUS) 65 Unit(s) SubCutaneous at bedtime  insulin lispro (HumaLOG) corrective regimen sliding scale   SubCutaneous three times a day before meals  insulin lispro Injectable (HumaLOG) 24 Unit(s) SubCutaneous three times a day with meals  levothyroxine 50 MICROGram(s) Oral daily  metoprolol 25 milliGRAM(s) Oral two times a day  montelukast 10 milliGRAM(s) Oral at bedtime  sodium bicarbonate  Infusion 0.612 mEq/kG/Hr (200 mL/Hr) IV Continuous <Continuous>  sodium bicarbonate  Infusion 0.23 mEq/kG/Hr (75 mL/Hr) IV Continuous <Continuous>  sodium bicarbonate  Infusion 0.23 mEq/kG/Hr (75 mL/Hr) IV Continuous <Continuous>  sodium chloride 0.9% lock flush 3 milliLiter(s) IV Push every 8 hours  sodium chloride 0.9%. 500 milliLiter(s) (100 mL/Hr) IV Continuous <Continuous>      LABS:  10-16    141  |  102  |  37<H>  ----------------------------<  190<H>  3.9   |  21<L>  |  1.81<H>    Ca    9.1      16 Oct 2017 05:13    TPro  6.9  /  Alb  3.4  /  TBili  0.3  /  DBili      /  AST  47<H>  /  ALT  33  /  AlkPhos  67  10-16    Creatinine Trend: 1.81 <--, 1.71 <--, 1.49 <--, 1.53 <--    Albumin, Serum: 3.4 g/dL (10-16 @ 05:13)                              9.9    12.06 )-----------( 249      ( 16 Oct 2017 05:13 )             31.3     Urine Studies:      HbA1c 10.4      [10-13-17 @ 08:45]        RADIOLOGY & ADDITIONAL STUDIES:

## 2017-10-17 LAB
BUN SERPL-MCNC: 40 MG/DL — HIGH (ref 7–23)
CALCIUM SERPL-MCNC: 8.9 MG/DL — SIGNIFICANT CHANGE UP (ref 8.4–10.5)
CHLORIDE SERPL-SCNC: 101 MMOL/L — SIGNIFICANT CHANGE UP (ref 98–107)
CO2 SERPL-SCNC: 21 MMOL/L — LOW (ref 22–31)
CREAT SERPL-MCNC: 1.76 MG/DL — HIGH (ref 0.5–1.3)
GLUCOSE SERPL-MCNC: 183 MG/DL — HIGH (ref 70–99)
HCT VFR BLD CALC: 31.2 % — LOW (ref 34.5–45)
HGB BLD-MCNC: 9.9 G/DL — LOW (ref 11.5–15.5)
MCHC RBC-ENTMCNC: 28.3 PG — SIGNIFICANT CHANGE UP (ref 27–34)
MCHC RBC-ENTMCNC: 31.7 % — LOW (ref 32–36)
MCV RBC AUTO: 89.1 FL — SIGNIFICANT CHANGE UP (ref 80–100)
NRBC # FLD: 0 — SIGNIFICANT CHANGE UP
PLATELET # BLD AUTO: 274 K/UL — SIGNIFICANT CHANGE UP (ref 150–400)
PMV BLD: 9.4 FL — SIGNIFICANT CHANGE UP (ref 7–13)
POTASSIUM SERPL-MCNC: 3.4 MMOL/L — LOW (ref 3.5–5.3)
POTASSIUM SERPL-SCNC: 3.4 MMOL/L — LOW (ref 3.5–5.3)
RBC # BLD: 3.5 M/UL — LOW (ref 3.8–5.2)
RBC # FLD: 14.2 % — SIGNIFICANT CHANGE UP (ref 10.3–14.5)
SODIUM SERPL-SCNC: 140 MMOL/L — SIGNIFICANT CHANGE UP (ref 135–145)
WBC # BLD: 12.32 K/UL — HIGH (ref 3.8–10.5)
WBC # FLD AUTO: 12.32 K/UL — HIGH (ref 3.8–10.5)

## 2017-10-17 RX ORDER — INSULIN LISPRO 100/ML
VIAL (ML) SUBCUTANEOUS
Qty: 0 | Refills: 0 | Status: DISCONTINUED | OUTPATIENT
Start: 2017-10-17 | End: 2017-10-20

## 2017-10-17 RX ORDER — POTASSIUM CHLORIDE 20 MEQ
40 PACKET (EA) ORAL ONCE
Qty: 0 | Refills: 0 | Status: COMPLETED | OUTPATIENT
Start: 2017-10-17 | End: 2017-10-17

## 2017-10-17 RX ADMIN — Medication 2: at 13:12

## 2017-10-17 RX ADMIN — Medication 1: at 17:35

## 2017-10-17 RX ADMIN — FAMOTIDINE 20 MILLIGRAM(S): 10 INJECTION INTRAVENOUS at 11:43

## 2017-10-17 RX ADMIN — Medication 2: at 09:34

## 2017-10-17 RX ADMIN — Medication 81 MILLIGRAM(S): at 11:41

## 2017-10-17 RX ADMIN — SODIUM CHLORIDE 3 MILLILITER(S): 9 INJECTION INTRAMUSCULAR; INTRAVENOUS; SUBCUTANEOUS at 06:04

## 2017-10-17 RX ADMIN — ATORVASTATIN CALCIUM 40 MILLIGRAM(S): 80 TABLET, FILM COATED ORAL at 21:36

## 2017-10-17 RX ADMIN — Medication 24 UNIT(S): at 09:33

## 2017-10-17 RX ADMIN — Medication 24 UNIT(S): at 13:13

## 2017-10-17 RX ADMIN — Medication 50 MICROGRAM(S): at 06:09

## 2017-10-17 RX ADMIN — Medication 1: at 22:39

## 2017-10-17 RX ADMIN — SODIUM CHLORIDE 3 MILLILITER(S): 9 INJECTION INTRAMUSCULAR; INTRAVENOUS; SUBCUTANEOUS at 21:37

## 2017-10-17 RX ADMIN — Medication 24 UNIT(S): at 17:35

## 2017-10-17 RX ADMIN — Medication 25 MILLIGRAM(S): at 06:09

## 2017-10-17 RX ADMIN — MONTELUKAST 10 MILLIGRAM(S): 4 TABLET, CHEWABLE ORAL at 21:36

## 2017-10-17 RX ADMIN — SODIUM CHLORIDE 3 MILLILITER(S): 9 INJECTION INTRAMUSCULAR; INTRAVENOUS; SUBCUTANEOUS at 12:19

## 2017-10-17 RX ADMIN — INSULIN GLARGINE 65 UNIT(S): 100 INJECTION, SOLUTION SUBCUTANEOUS at 22:39

## 2017-10-17 RX ADMIN — Medication 20 MILLIGRAM(S): at 06:09

## 2017-10-17 RX ADMIN — SENNA PLUS 2 TABLET(S): 8.6 TABLET ORAL at 21:36

## 2017-10-17 RX ADMIN — Medication 25 MILLIGRAM(S): at 17:36

## 2017-10-17 RX ADMIN — Medication 40 MILLIEQUIVALENT(S): at 10:36

## 2017-10-17 NOTE — PROGRESS NOTE ADULT - SUBJECTIVE AND OBJECTIVE BOX
Subjective: Denies chest pain / shortness of breath     MEDICATIONS  (STANDING):  aspirin enteric coated 81 milliGRAM(s) Oral daily  atorvastatin 40 milliGRAM(s) Oral at bedtime  docusate sodium 100 milliGRAM(s) Oral daily  famotidine    Tablet 20 milliGRAM(s) Oral daily  influenza   Vaccine 0.5 milliLiter(s) IntraMuscular once  insulin glargine Injectable (LANTUS) 65 Unit(s) SubCutaneous at bedtime  insulin lispro (HumaLOG) corrective regimen sliding scale   SubCutaneous three times a day before meals  insulin lispro Injectable (HumaLOG) 24 Unit(s) SubCutaneous three times a day with meals  levothyroxine 50 MICROGram(s) Oral daily  metoprolol 25 milliGRAM(s) Oral two times a day  montelukast 10 milliGRAM(s) Oral at bedtime  senna 2 Tablet(s) Oral at bedtime    LABS:                        9.9    12.32 )-----------( 274      ( 17 Oct 2017 06:04 )             31.2     140  |  101  |  40<H>  ----------------------------<  183<H>  3.4<L>   |  21<L>  |  1.76<H>    Ca    8.9      17 Oct 2017 06:04    TPro  6.9  /  Alb  3.4  /  TBili  0.3  /  DBili  x   /  AST  47<H>  /  ALT  33  /  AlkPhos  67  10-16    Creatinine Trend: 1.76<--, 1.81<--, 1.71<--, 1.49<--, 1.53<--     PHYSICAL EXAM  Vital Signs Last 24 Hrs  T(C): 36.7 (17 Oct 2017 06:04), Max: 36.7 (16 Oct 2017 14:19)  T(F): 98.1 (17 Oct 2017 06:04), Max: 98.1 (16 Oct 2017 14:19)  HR: 77 (17 Oct 2017 06:04) (68 - 82)  BP: 125/64 (17 Oct 2017 06:04) (98/55 - 128/68)  RR: 18 (17 Oct 2017 06:04) (18 - 18)  SpO2: 100% (17 Oct 2017 06:04) (100% - 100%)    Cardiovascular: Normal S1 S2,  RRR   No JVD, 1/6 YUUSF murmur, Peripheral pulses palpable 2+ bilaterally  Respiratory: Lungs clear to auscultation, normal effort 	  Gastrointestinal:  Soft, Non-tender, + BS	  Extremities no edema, cyanosis, clubbing B/L LE's R groin + hematoma  s/p compression  soft non tender no signs of active bleed     TELEMETRY: SR     DIAGNOSTIC TESTING:    Catheterization: LVEDP 24, ostial Ramus ; SVG to Ramus down, SVG to PDA down, LIMA to LAD patent;     < from: VA Duplex Arterial Lower Ext, Right. (10.16.17 @ 08:21) >  Summary/Impressions:  No pseudoaneurysm.  ------------------------------------------------------------------------  Confirmed on  10/16/2017 - 11:33 AM by Ajith Louise MD,    < end of copied text >    ASSESSMENT/PLAN: 71y Female with HTN, hyperlipidemia, DM-II, known CAD with 3V CABG (LIMA to LAD, SVG to OM, SVG to PDA) at [facility] 2014 who c/o chest pain with subsequent abnormal stress test.     --s/p CATH w/ LVEDP 24, ostial Ramus ; SVG to Ramus down, SVG to PDA down, LIMA to LAD patent;   ---RLE doppler 10/16 --NO pseudoaneurysm  --plan for PCI to Ramus this week once optimized from renal perspective  --hold on cath today given elevated creatinine    Juliane Ftich PA-C  [facility]  [address], [address]   [place] [address]  office [phone]  pager [phone]

## 2017-10-17 NOTE — PROGRESS NOTE ADULT - SUBJECTIVE AND OBJECTIVE BOX
Dr. Muhammad (Nephrology)  Office [phone]  Cell [phone]  [facility]  Cell [phone]      Patient is a 71y old  Female who presents with a chief complaint of     Patient seen and examined at bedside. No chest pain/sob    VITALS:  T(F): 98.1 (10-17-17 @ 06:04), Max: 98.1 (10-16-17 @ 14:19)  HR: 77 (10-17-17 @ 06:04)  BP: 125/64 (10-17-17 @ 06:04)  RR: 18 (10-17-17 @ 06:04)  SpO2: 100% (10-17-17 @ 06:04)  Wt(kg): --        PHYSICAL EXAM:  Constitutional: NAD  Neck: No JVD  Respiratory: CTAB, no wheezes, rales or rhonchi  Cardiovascular: S1, S2, RRR  Gastrointestinal: BS+, soft, NT/ND  Extremities: No peripheral edema    Hospital Medications:   MEDICATIONS  (STANDING):  aspirin enteric coated 81 milliGRAM(s) Oral daily  atorvastatin 40 milliGRAM(s) Oral at bedtime  dextrose 5%. 1000 milliLiter(s) (50 mL/Hr) IV Continuous <Continuous>  dextrose 50% Injectable 12.5 Gram(s) IV Push once  dextrose 50% Injectable 25 Gram(s) IV Push once  dextrose 50% Injectable 25 Gram(s) IV Push once  docusate sodium 100 milliGRAM(s) Oral daily  famotidine    Tablet 20 milliGRAM(s) Oral daily  influenza   Vaccine 0.5 milliLiter(s) IntraMuscular once  insulin glargine Injectable (LANTUS) 65 Unit(s) SubCutaneous at bedtime  insulin lispro (HumaLOG) corrective regimen sliding scale   SubCutaneous three times a day before meals  insulin lispro Injectable (HumaLOG) 24 Unit(s) SubCutaneous three times a day with meals  levothyroxine 50 MICROGram(s) Oral daily  metoprolol 25 milliGRAM(s) Oral two times a day  montelukast 10 milliGRAM(s) Oral at bedtime  senna 2 Tablet(s) Oral at bedtime  sodium bicarbonate  Infusion 0.23 mEq/kG/Hr (75 mL/Hr) IV Continuous <Continuous>  sodium bicarbonate  Infusion 0.612 mEq/kG/Hr (200 mL/Hr) IV Continuous <Continuous>  sodium bicarbonate  Infusion 0.23 mEq/kG/Hr (75 mL/Hr) IV Continuous <Continuous>  sodium chloride 0.9% lock flush 3 milliLiter(s) IV Push every 8 hours  sodium chloride 0.9%. 500 milliLiter(s) (100 mL/Hr) IV Continuous <Continuous>      LABS:  10-17    140  |  101  |  40<H>  ----------------------------<  183<H>  3.4<L>   |  21<L>  |  1.76<H>    Ca    8.9      17 Oct 2017 06:04    TPro  6.9  /  Alb  3.4  /  TBili  0.3  /  DBili      /  AST  47<H>  /  ALT  33  /  AlkPhos  67  10-16    Creatinine Trend: 1.76 <--, 1.81 <--, 1.71 <--, 1.49 <--, 1.53 <--                                9.9    12.32 )-----------( 274      ( 17 Oct 2017 06:04 )             31.2     Urine Studies:      HbA1c 10.4      [10-13-17 @ 08:45]        RADIOLOGY & ADDITIONAL STUDIES:

## 2017-10-17 NOTE — PROGRESS NOTE ADULT - SUBJECTIVE AND OBJECTIVE BOX
Patient is a 71y old  Female who presents with a chief complaint of     INTERVAL HPI/OVERNIGHT EVENTS:  T(C): 36.6 (10-17-17 @ 13:56), Max: 36.7 (10-17-17 @ 06:04)  HR: 91 (10-17-17 @ 17:32) (68 - 91)  BP: 134/69 (10-17-17 @ 17:32) (111/63 - 134/69)  RR: 18 (10-17-17 @ 13:56) (18 - 18)  SpO2: 98% (10-17-17 @ 13:56) (98% - 100%)  Wt(kg): --  I&O's Summary      LABS:                        9.9    12.32 )-----------( 274      ( 17 Oct 2017 06:04 )             31.2     10-17    140  |  101  |  40<H>  ----------------------------<  183<H>  3.4<L>   |  21<L>  |  1.76<H>    Ca    8.9      17 Oct 2017 06:04    TPro  6.9  /  Alb  3.4  /  TBili  0.3  /  DBili  x   /  AST  47<H>  /  ALT  33  /  AlkPhos  67  10-16        CAPILLARY BLOOD GLUCOSE  274 (16 Oct 2017 22:03)  190 (16 Oct 2017 18:05)      POCT Blood Glucose.: 192 mg/dL (17 Oct 2017 16:45)  POCT Blood Glucose.: 216 mg/dL (17 Oct 2017 12:54)  POCT Blood Glucose.: 204 mg/dL (17 Oct 2017 08:51)  POCT Blood Glucose.: 274 mg/dL (16 Oct 2017 21:55)  POCT Blood Glucose.: 190 mg/dL (16 Oct 2017 17:52)            MEDICATIONS  (STANDING):  aspirin enteric coated 81 milliGRAM(s) Oral daily  atorvastatin 40 milliGRAM(s) Oral at bedtime  dextrose 5%. 1000 milliLiter(s) (50 mL/Hr) IV Continuous <Continuous>  dextrose 50% Injectable 12.5 Gram(s) IV Push once  dextrose 50% Injectable 25 Gram(s) IV Push once  dextrose 50% Injectable 25 Gram(s) IV Push once  docusate sodium 100 milliGRAM(s) Oral daily  famotidine    Tablet 20 milliGRAM(s) Oral daily  influenza   Vaccine 0.5 milliLiter(s) IntraMuscular once  insulin glargine Injectable (LANTUS) 65 Unit(s) SubCutaneous at bedtime  insulin lispro (HumaLOG) corrective regimen sliding scale   SubCutaneous three times a day before meals  insulin lispro Injectable (HumaLOG) 24 Unit(s) SubCutaneous three times a day with meals  levothyroxine 50 MICROGram(s) Oral daily  metoprolol 25 milliGRAM(s) Oral two times a day  montelukast 10 milliGRAM(s) Oral at bedtime  senna 2 Tablet(s) Oral at bedtime  sodium bicarbonate  Infusion 0.23 mEq/kG/Hr (75 mL/Hr) IV Continuous <Continuous>  sodium bicarbonate  Infusion 0.612 mEq/kG/Hr (200 mL/Hr) IV Continuous <Continuous>  sodium bicarbonate  Infusion 0.23 mEq/kG/Hr (75 mL/Hr) IV Continuous <Continuous>  sodium chloride 0.9% lock flush 3 milliLiter(s) IV Push every 8 hours  sodium chloride 0.9%. 500 milliLiter(s) (100 mL/Hr) IV Continuous <Continuous>    MEDICATIONS  (PRN):  acetaminophen   Tablet. 650 milliGRAM(s) Oral every 6 hours PRN mild, moderate, severe pain  dextrose Gel 1 Dose(s) Oral once PRN Blood Glucose LESS THAN 70 milliGRAM(s)/deciliter  glucagon  Injectable 1 milliGRAM(s) IntraMuscular once PRN Glucose LESS THAN 70 milligrams/deciliter          PHYSICAL EXAM:  GENERAL: NAD, well-groomed, well-developed  HEAD:  Atraumatic, Normocephalic  CHEST/LUNG: Clear to percussion bilaterally; No rales, rhonchi, wheezing, or rubs  HEART: Regular rate and rhythm; No murmurs, rubs, or gallops  ABDOMEN: Soft, Nontender, Nondistended; Bowel sounds present  EXTREMITIES:  2+ Peripheral Pulses, No clubbing, cyanosis, or edema  LYMPH: No lymphadenopathy noted  SKIN: No rashes or lesions    Care Discussed with Consultants/Other Providers [ ] YES  [ ] NO

## 2017-10-18 LAB
AMYLASE P1 CFR SERPL: 89 U/L — SIGNIFICANT CHANGE UP (ref 25–125)
APPEARANCE UR: CLEAR — SIGNIFICANT CHANGE UP
BASOPHILS # BLD AUTO: 0.05 K/UL — SIGNIFICANT CHANGE UP (ref 0–0.2)
BASOPHILS NFR BLD AUTO: 0.4 % — SIGNIFICANT CHANGE UP (ref 0–2)
BILIRUB UR-MCNC: NEGATIVE — SIGNIFICANT CHANGE UP
BLOOD UR QL VISUAL: NEGATIVE — SIGNIFICANT CHANGE UP
BUN SERPL-MCNC: 40 MG/DL — HIGH (ref 7–23)
CALCIUM SERPL-MCNC: 8.9 MG/DL — SIGNIFICANT CHANGE UP (ref 8.4–10.5)
CHLORIDE SERPL-SCNC: 103 MMOL/L — SIGNIFICANT CHANGE UP (ref 98–107)
CO2 SERPL-SCNC: 21 MMOL/L — LOW (ref 22–31)
COLOR SPEC: SIGNIFICANT CHANGE UP
CREAT SERPL-MCNC: 1.63 MG/DL — HIGH (ref 0.5–1.3)
CRP SERPL-MCNC: 7.8 MG/L — HIGH
EOSINOPHIL # BLD AUTO: 0.46 K/UL — SIGNIFICANT CHANGE UP (ref 0–0.5)
EOSINOPHIL NFR BLD AUTO: 3.9 % — SIGNIFICANT CHANGE UP (ref 0–6)
ERYTHROCYTE [SEDIMENTATION RATE] IN BLOOD: 102 MM/HR — HIGH (ref 4–25)
GLUCOSE SERPL-MCNC: 199 MG/DL — HIGH (ref 70–99)
GLUCOSE UR-MCNC: 500 — SIGNIFICANT CHANGE UP
HCT VFR BLD CALC: 30 % — LOW (ref 34.5–45)
HCT VFR BLD CALC: 30 % — LOW (ref 34.5–45)
HGB BLD-MCNC: 9.9 G/DL — LOW (ref 11.5–15.5)
HGB BLD-MCNC: 9.9 G/DL — LOW (ref 11.5–15.5)
IMM GRANULOCYTES # BLD AUTO: 0.14 # — SIGNIFICANT CHANGE UP
IMM GRANULOCYTES NFR BLD AUTO: 1.2 % — SIGNIFICANT CHANGE UP (ref 0–1.5)
KETONES UR-MCNC: NEGATIVE — SIGNIFICANT CHANGE UP
LEUKOCYTE ESTERASE UR-ACNC: NEGATIVE — SIGNIFICANT CHANGE UP
LIDOCAIN IGE QN: 83 U/L — HIGH (ref 7–60)
LYMPHOCYTES # BLD AUTO: 36.8 % — SIGNIFICANT CHANGE UP (ref 13–44)
LYMPHOCYTES # BLD AUTO: 4.32 K/UL — HIGH (ref 1–3.3)
MAGNESIUM SERPL-MCNC: 1.9 MG/DL — SIGNIFICANT CHANGE UP (ref 1.6–2.6)
MCHC RBC-ENTMCNC: 29.2 PG — SIGNIFICANT CHANGE UP (ref 27–34)
MCHC RBC-ENTMCNC: 29.2 PG — SIGNIFICANT CHANGE UP (ref 27–34)
MCHC RBC-ENTMCNC: 33 % — SIGNIFICANT CHANGE UP (ref 32–36)
MCHC RBC-ENTMCNC: 33 % — SIGNIFICANT CHANGE UP (ref 32–36)
MCV RBC AUTO: 88.5 FL — SIGNIFICANT CHANGE UP (ref 80–100)
MCV RBC AUTO: 88.5 FL — SIGNIFICANT CHANGE UP (ref 80–100)
MONOCYTES # BLD AUTO: 0.97 K/UL — HIGH (ref 0–0.9)
MONOCYTES NFR BLD AUTO: 8.3 % — SIGNIFICANT CHANGE UP (ref 2–14)
NEUTROPHILS # BLD AUTO: 5.8 K/UL — SIGNIFICANT CHANGE UP (ref 1.8–7.4)
NEUTROPHILS NFR BLD AUTO: 49.4 % — SIGNIFICANT CHANGE UP (ref 43–77)
NITRITE UR-MCNC: NEGATIVE — SIGNIFICANT CHANGE UP
NRBC # FLD: 0 — SIGNIFICANT CHANGE UP
NRBC # FLD: 0 — SIGNIFICANT CHANGE UP
PH UR: 6.5 — SIGNIFICANT CHANGE UP (ref 4.6–8)
PHOSPHATE SERPL-MCNC: 2.8 MG/DL — SIGNIFICANT CHANGE UP (ref 2.5–4.5)
PLATELET # BLD AUTO: 292 K/UL — SIGNIFICANT CHANGE UP (ref 150–400)
PLATELET # BLD AUTO: 292 K/UL — SIGNIFICANT CHANGE UP (ref 150–400)
PMV BLD: 9.3 FL — SIGNIFICANT CHANGE UP (ref 7–13)
PMV BLD: 9.3 FL — SIGNIFICANT CHANGE UP (ref 7–13)
POTASSIUM SERPL-MCNC: 4 MMOL/L — SIGNIFICANT CHANGE UP (ref 3.5–5.3)
POTASSIUM SERPL-SCNC: 4 MMOL/L — SIGNIFICANT CHANGE UP (ref 3.5–5.3)
PROT UR-MCNC: 100 — HIGH
RBC # BLD: 3.39 M/UL — LOW (ref 3.8–5.2)
RBC # BLD: 3.39 M/UL — LOW (ref 3.8–5.2)
RBC # FLD: 14.2 % — SIGNIFICANT CHANGE UP (ref 10.3–14.5)
RBC # FLD: 14.2 % — SIGNIFICANT CHANGE UP (ref 10.3–14.5)
RBC CASTS # UR COMP ASSIST: SIGNIFICANT CHANGE UP (ref 0–?)
SODIUM SERPL-SCNC: 140 MMOL/L — SIGNIFICANT CHANGE UP (ref 135–145)
SP GR SPEC: 1.01 — SIGNIFICANT CHANGE UP (ref 1–1.03)
SQUAMOUS # UR AUTO: SIGNIFICANT CHANGE UP
UROBILINOGEN FLD QL: NORMAL E.U. — SIGNIFICANT CHANGE UP (ref 0.1–0.2)
WBC # BLD: 11.89 K/UL — HIGH (ref 3.8–10.5)
WBC # BLD: 11.89 K/UL — HIGH (ref 3.8–10.5)
WBC # FLD AUTO: 11.89 K/UL — HIGH (ref 3.8–10.5)
WBC # FLD AUTO: 11.89 K/UL — HIGH (ref 3.8–10.5)
WBC UR QL: SIGNIFICANT CHANGE UP (ref 0–?)

## 2017-10-18 PROCEDURE — 71010: CPT | Mod: 26

## 2017-10-18 RX ADMIN — SODIUM CHLORIDE 3 MILLILITER(S): 9 INJECTION INTRAMUSCULAR; INTRAVENOUS; SUBCUTANEOUS at 06:01

## 2017-10-18 RX ADMIN — Medication 25 MILLIGRAM(S): at 06:01

## 2017-10-18 RX ADMIN — FAMOTIDINE 20 MILLIGRAM(S): 10 INJECTION INTRAVENOUS at 13:10

## 2017-10-18 RX ADMIN — ATORVASTATIN CALCIUM 40 MILLIGRAM(S): 80 TABLET, FILM COATED ORAL at 22:10

## 2017-10-18 RX ADMIN — Medication 50 MICROGRAM(S): at 06:01

## 2017-10-18 RX ADMIN — INSULIN GLARGINE 65 UNIT(S): 100 INJECTION, SOLUTION SUBCUTANEOUS at 22:10

## 2017-10-18 RX ADMIN — Medication 2: at 10:01

## 2017-10-18 RX ADMIN — Medication 25 MILLIGRAM(S): at 17:26

## 2017-10-18 RX ADMIN — MONTELUKAST 10 MILLIGRAM(S): 4 TABLET, CHEWABLE ORAL at 22:10

## 2017-10-18 RX ADMIN — SODIUM CHLORIDE 3 MILLILITER(S): 9 INJECTION INTRAMUSCULAR; INTRAVENOUS; SUBCUTANEOUS at 13:08

## 2017-10-18 RX ADMIN — Medication 3: at 13:09

## 2017-10-18 RX ADMIN — Medication 4: at 22:10

## 2017-10-18 RX ADMIN — Medication 24 UNIT(S): at 13:09

## 2017-10-18 RX ADMIN — Medication 100 MILLIGRAM(S): at 13:10

## 2017-10-18 RX ADMIN — Medication 24 UNIT(S): at 10:01

## 2017-10-18 RX ADMIN — SENNA PLUS 2 TABLET(S): 8.6 TABLET ORAL at 22:10

## 2017-10-18 RX ADMIN — Medication 24 UNIT(S): at 17:26

## 2017-10-18 RX ADMIN — Medication 81 MILLIGRAM(S): at 13:10

## 2017-10-18 RX ADMIN — SODIUM CHLORIDE 3 MILLILITER(S): 9 INJECTION INTRAMUSCULAR; INTRAVENOUS; SUBCUTANEOUS at 22:14

## 2017-10-18 NOTE — PROGRESS NOTE ADULT - SUBJECTIVE AND OBJECTIVE BOX
Patient is a 71y old  Female who presents with a chief complaint of     INTERVAL HPI/OVERNIGHT EVENTS:  T(C): 37 (10-18-17 @ 14:27), Max: 37 (10-18-17 @ 14:27)  HR: 88 (10-18-17 @ 14:27) (74 - 91)  BP: 121/66 (10-18-17 @ 14:27) (121/66 - 134/73)  RR: 18 (10-18-17 @ 14:27) (18 - 18)  SpO2: 97% (10-18-17 @ 14:27) (97% - 99%)  Wt(kg): --  I&O's Summary      LABS:                        9.9    11.89 )-----------( 292      ( 18 Oct 2017 06:30 )             30.0     10-18    140  |  103  |  40<H>  ----------------------------<  199<H>  4.0   |  21<L>  |  1.63<H>    Ca    8.9      18 Oct 2017 06:30  Phos  2.8     10-18  Mg     1.9     10-18        Urinalysis Basic - ( 18 Oct 2017 11:15 )    Color: PLYEL / Appearance: CLEAR / S.014 / pH: 6.5  Gluc: 500 / Ketone: NEGATIVE  / Bili: NEGATIVE / Urobili: NORMAL E.U.   Blood: NEGATIVE / Protein: 100 / Nitrite: NEGATIVE   Leuk Esterase: NEGATIVE / RBC: 0-2 / WBC 0-2   Sq Epi: OCC / Non Sq Epi: x / Bacteria: x      CAPILLARY BLOOD GLUCOSE  254 (18 Oct 2017 12:25)  216 (18 Oct 2017 08:59)      POCT Blood Glucose.: 135 mg/dL (18 Oct 2017 17:02)  POCT Blood Glucose.: 153 mg/dL (17 Oct 2017 22:17)        Urinalysis Basic - ( 18 Oct 2017 11:15 )    Color: PLYEL / Appearance: CLEAR / S.014 / pH: 6.5  Gluc: 500 / Ketone: NEGATIVE  / Bili: NEGATIVE / Urobili: NORMAL E.U.   Blood: NEGATIVE / Protein: 100 / Nitrite: NEGATIVE   Leuk Esterase: NEGATIVE / RBC: 0-2 / WBC 0-2   Sq Epi: OCC / Non Sq Epi: x / Bacteria: x        MEDICATIONS  (STANDING):  aspirin enteric coated 81 milliGRAM(s) Oral daily  atorvastatin 40 milliGRAM(s) Oral at bedtime  dextrose 5%. 1000 milliLiter(s) (50 mL/Hr) IV Continuous <Continuous>  dextrose 50% Injectable 12.5 Gram(s) IV Push once  dextrose 50% Injectable 25 Gram(s) IV Push once  dextrose 50% Injectable 25 Gram(s) IV Push once  docusate sodium 100 milliGRAM(s) Oral daily  famotidine    Tablet 20 milliGRAM(s) Oral daily  influenza   Vaccine 0.5 milliLiter(s) IntraMuscular once  insulin glargine Injectable (LANTUS) 65 Unit(s) SubCutaneous at bedtime  insulin lispro (HumaLOG) corrective regimen sliding scale   SubCutaneous Before meals and at bedtime  insulin lispro Injectable (HumaLOG) 24 Unit(s) SubCutaneous three times a day with meals  levothyroxine 50 MICROGram(s) Oral daily  metoprolol 25 milliGRAM(s) Oral two times a day  montelukast 10 milliGRAM(s) Oral at bedtime  senna 2 Tablet(s) Oral at bedtime  sodium bicarbonate  Infusion 0.23 mEq/kG/Hr (75 mL/Hr) IV Continuous <Continuous>  sodium bicarbonate  Infusion 0.612 mEq/kG/Hr (200 mL/Hr) IV Continuous <Continuous>  sodium bicarbonate  Infusion 0.23 mEq/kG/Hr (75 mL/Hr) IV Continuous <Continuous>  sodium chloride 0.9% lock flush 3 milliLiter(s) IV Push every 8 hours  sodium chloride 0.9%. 500 milliLiter(s) (100 mL/Hr) IV Continuous <Continuous>    MEDICATIONS  (PRN):  acetaminophen   Tablet. 650 milliGRAM(s) Oral every 6 hours PRN mild, moderate, severe pain  dextrose Gel 1 Dose(s) Oral once PRN Blood Glucose LESS THAN 70 milliGRAM(s)/deciliter  glucagon  Injectable 1 milliGRAM(s) IntraMuscular once PRN Glucose LESS THAN 70 milligrams/deciliter          PHYSICAL EXAM:  GENERAL: NAD, well-groomed, well-developed  HEAD:  Atraumatic, Normocephalic  CHEST/LUNG: Clear to percussion bilaterally; No rales, rhonchi, wheezing, or rubs  HEART: Regular rate and rhythm; No murmurs, rubs, or gallops  ABDOMEN: Soft, Nontender, Nondistended; Bowel sounds present  EXTREMITIES:  2+ Peripheral Pulses, No clubbing, cyanosis, or edema  LYMPH: No lymphadenopathy noted  SKIN: No rashes or lesions    Care Discussed with Consultants/Other Providers [ =] YES  [ ] NO

## 2017-10-18 NOTE — CONSULT NOTE ADULT - SUBJECTIVE AND OBJECTIVE BOX
Patient is a 71y old  Female who presents with a chief complaint of     HPI:  71 year old Sinhala speaking female with HTN, hyperlipidemia, DM-II, known CAD with 3V CABG (LIMA to LAD, SVG to OM, SVG to PDA) at [place] [address] who c/o chest pain with subsequent abnormal stress test.   In light of patients CAD history, symptoms and abnormal noninvasive test findings there is high suspicion for CAD progression. Patient is now referred to [facility] for a cardiac catheterization with possible PTCA/stent.     HOSPITAL COURSE:    Pt underwent cardiac catheterization showing closed SVG to OM and SVG to RPDA with patent LIMA-LAD.  She has an ostial RI lesion and is planned for staged PCI of ostial RI due to elevated Cr.            REVIEW OF SYSTEMS:    CONSTITUTIONAL: No fever, weight loss, or fatigue  EYES: No eye pain, visual disturbances, or discharge  ENMT:  No sore throat  NECK: No pain or stiffness  RESPIRATORY: No cough, wheezing, chills or hemoptysis; No shortness of breath  CARDIOVASCULAR: No chest pain, palpitations, dizziness, or leg swelling  GASTROINTESTINAL: No abdominal or epigastric pain. No nausea, vomiting, or hematemesis; No diarrhea or constipation. No melena or hematochezia.  GENITOURINARY: No dysuria, frequency, hematuria, or incontinence  NEUROLOGICAL: No headaches, memory loss, loss of strength, numbness, or tremors  SKIN: No itching, burning, rashes, or lesions   LYMPH NODES: No enlarged glands  MUSCULOSKELETAL: No joint pain or swelling; No muscle, back, or extremity pain      PAST MEDICAL & SURGICAL HISTORY:  GERD (gastroesophageal reflux disease)  CAD (coronary artery disease): s/p CABG  Hypothyroidism  Hyperlipidemia  Diabetes mellitus, type 2  S/P CABG (coronary artery bypass graft)      Allergies    No Known Allergies    Intolerances        FAMILY HISTORY:  No pertinent family history in first degree relatives      SOCIAL HISTORY:        MEDICATIONS  (STANDING):  aspirin enteric coated 81 milliGRAM(s) Oral daily  atorvastatin 40 milliGRAM(s) Oral at bedtime  dextrose 5%. 1000 milliLiter(s) (50 mL/Hr) IV Continuous <Continuous>  dextrose 50% Injectable 12.5 Gram(s) IV Push once  dextrose 50% Injectable 25 Gram(s) IV Push once  dextrose 50% Injectable 25 Gram(s) IV Push once  docusate sodium 100 milliGRAM(s) Oral daily  famotidine    Tablet 20 milliGRAM(s) Oral daily  influenza   Vaccine 0.5 milliLiter(s) IntraMuscular once  insulin glargine Injectable (LANTUS) 65 Unit(s) SubCutaneous at bedtime  insulin lispro (HumaLOG) corrective regimen sliding scale   SubCutaneous Before meals and at bedtime  insulin lispro Injectable (HumaLOG) 24 Unit(s) SubCutaneous three times a day with meals  levothyroxine 50 MICROGram(s) Oral daily  metoprolol 25 milliGRAM(s) Oral two times a day  montelukast 10 milliGRAM(s) Oral at bedtime  senna 2 Tablet(s) Oral at bedtime  sodium bicarbonate  Infusion 0.23 mEq/kG/Hr (75 mL/Hr) IV Continuous <Continuous>  sodium bicarbonate  Infusion 0.612 mEq/kG/Hr (200 mL/Hr) IV Continuous <Continuous>  sodium bicarbonate  Infusion 0.23 mEq/kG/Hr (75 mL/Hr) IV Continuous <Continuous>  sodium chloride 0.9% lock flush 3 milliLiter(s) IV Push every 8 hours  sodium chloride 0.9%. 500 milliLiter(s) (100 mL/Hr) IV Continuous <Continuous>    MEDICATIONS  (PRN):  acetaminophen   Tablet. 650 milliGRAM(s) Oral every 6 hours PRN mild, moderate, severe pain  dextrose Gel 1 Dose(s) Oral once PRN Blood Glucose LESS THAN 70 milliGRAM(s)/deciliter  glucagon  Injectable 1 milliGRAM(s) IntraMuscular once PRN Glucose LESS THAN 70 milligrams/deciliter      Vital Signs Last 24 Hrs  T(C): 36.9 (18 Oct 2017 06:00), Max: 36.9 (18 Oct 2017 06:00)  T(F): 98.4 (18 Oct 2017 06:00), Max: 98.4 (18 Oct 2017 06:00)  HR: 75 (18 Oct 2017 06:00) (74 - 91)  BP: 134/73 (18 Oct 2017 06:00) (111/63 - 134/73)  BP(mean): --  RR: 18 (18 Oct 2017 06:00) (18 - 18)  SpO2: 99% (18 Oct 2017 06:00) (98% - 99%)    PHYSICAL EXAM:    GENERAL: NAD, well-groomed, well-developed  HEAD:  Atraumatic, Normocephalic  EYES: EOMI, PERRLA, conjunctiva and sclera clear  ENMT: No tonsillar erythema, exudates, or enlargement; Moist mucous membranes, Good dentition, No lesions  NECK: Supple, No JVD, Normal thyroid  NERVOUS SYSTEM:  Alert & Oriented X3, Good concentration; Motor Strength 5/5 B/L upper and lower extremities; DTRs 2+ intact and symmetric  CHEST/LUNG: Clear to percussion bilaterally; No rales, rhonchi, wheezing, or rubs  HEART: Regular rate and rhythm; No murmurs, rubs, or gallops  ABDOMEN: Soft, Nontender, Nondistended; Bowel sounds present  EXTREMITIES:  2+ Peripheral Pulses, No clubbing, cyanosis, or edema  LYMPH: No lymphadenopathy noted  SKIN: No rashes or lesions    LABS:  CBC Full  -  ( 18 Oct 2017 06:30 )  WBC Count : 11.89 K/uL  Hemoglobin : 9.9 g/dL  Hematocrit : 30.0 %  Platelet Count - Automated : 292 K/uL  Mean Cell Volume : 88.5 fL  Mean Cell Hemoglobin : 29.2 pg  Mean Cell Hemoglobin Concentration : 33.0 %  Auto Neutrophil # : x  Auto Lymphocyte # : x  Auto Monocyte # : x  Auto Eosinophil # : x  Auto Basophil # : x  Auto Neutrophil % : x  Auto Lymphocyte % : x  Auto Monocyte % : x  Auto Eosinophil % : x  Auto Basophil % : x      10-18    140  |  103  |  40<H>  ----------------------------<  199<H>  4.0   |  21<L>  |  1.63<H>    Ca    8.9      18 Oct 2017 06:30  Phos  2.8     10-18  Mg     1.9     10-18      MICROBIOLOGY:    Blood Cultures:    Urine Cultures:          RADIOLOGY: Patient is a 71y old  Female who presents with a chief complaint of     HPI:  71 year old Yi speaking female with HTN, hyperlipidemia, DM-II, known CAD with 3V CABG (LIMA to LAD, SVG to OM, SVG to PDA) at [place] [address] who c/o chest pain with subsequent abnormal stress test.   In light of patients CAD history, symptoms and abnormal noninvasive test findings there is high suspicion for CAD progression. Patient is now referred to [facility] for a cardiac catheterization with possible PTCA/stent.     HOSPITAL COURSE:    Pt underwent cardiac catheterization showing closed SVG to OM and SVG to RPDA with patent LIMA-LAD.  She has an ostial RI lesion and is planned for staged PCI of ostial RI due to elevated Cr. Patient developed a hematoma post CAth on 10/13.  There was no evidence of pseudoaneurysm on arterial Doppler.  Pt has been afebrile and normotensive during her inpatient stay.  Her WBC is mildly elevated in the low teens.    ID consult was called to evaluate for underlying infectious cause of leukocytosis.        REVIEW OF SYSTEMS:    CONSTITUTIONAL: No fever, weight loss, or fatigue  EYES: No eye pain, visual disturbances, or discharge  ENMT:  No sore throat  NECK: No pain or stiffness  RESPIRATORY: No cough, wheezing, chills or hemoptysis; No shortness of breath  CARDIOVASCULAR: No chest pain, palpitations, dizziness, or leg swelling  GASTROINTESTINAL: No abdominal or epigastric pain. No nausea, vomiting, or hematemesis; No diarrhea or constipation. No melena or hematochezia.  GENITOURINARY: No dysuria, frequency, hematuria, or incontinence  NEUROLOGICAL: No headaches, memory loss, loss of strength, numbness, or tremors  SKIN: No itching, burning, rashes, or lesions   LYMPH NODES: No enlarged glands  MUSCULOSKELETAL: No joint pain or swelling; No muscle, back, or extremity pain      PAST MEDICAL & SURGICAL HISTORY:  GERD (gastroesophageal reflux disease)  CAD (coronary artery disease): s/p CABG  Hypothyroidism  Hyperlipidemia  Diabetes mellitus, type 2  S/P CABG (coronary artery bypass graft)      Allergies    No Known Allergies    Intolerances        FAMILY HISTORY:  No pertinent family history in first degree relatives      SOCIAL HISTORY:        MEDICATIONS  (STANDING):  aspirin enteric coated 81 milliGRAM(s) Oral daily  atorvastatin 40 milliGRAM(s) Oral at bedtime  dextrose 5%. 1000 milliLiter(s) (50 mL/Hr) IV Continuous <Continuous>  dextrose 50% Injectable 12.5 Gram(s) IV Push once  dextrose 50% Injectable 25 Gram(s) IV Push once  dextrose 50% Injectable 25 Gram(s) IV Push once  docusate sodium 100 milliGRAM(s) Oral daily  famotidine    Tablet 20 milliGRAM(s) Oral daily  influenza   Vaccine 0.5 milliLiter(s) IntraMuscular once  insulin glargine Injectable (LANTUS) 65 Unit(s) SubCutaneous at bedtime  insulin lispro (HumaLOG) corrective regimen sliding scale   SubCutaneous Before meals and at bedtime  insulin lispro Injectable (HumaLOG) 24 Unit(s) SubCutaneous three times a day with meals  levothyroxine 50 MICROGram(s) Oral daily  metoprolol 25 milliGRAM(s) Oral two times a day  montelukast 10 milliGRAM(s) Oral at bedtime  senna 2 Tablet(s) Oral at bedtime  sodium bicarbonate  Infusion 0.23 mEq/kG/Hr (75 mL/Hr) IV Continuous <Continuous>  sodium bicarbonate  Infusion 0.612 mEq/kG/Hr (200 mL/Hr) IV Continuous <Continuous>  sodium bicarbonate  Infusion 0.23 mEq/kG/Hr (75 mL/Hr) IV Continuous <Continuous>  sodium chloride 0.9% lock flush 3 milliLiter(s) IV Push every 8 hours  sodium chloride 0.9%. 500 milliLiter(s) (100 mL/Hr) IV Continuous <Continuous>    MEDICATIONS  (PRN):  acetaminophen   Tablet. 650 milliGRAM(s) Oral every 6 hours PRN mild, moderate, severe pain  dextrose Gel 1 Dose(s) Oral once PRN Blood Glucose LESS THAN 70 milliGRAM(s)/deciliter  glucagon  Injectable 1 milliGRAM(s) IntraMuscular once PRN Glucose LESS THAN 70 milligrams/deciliter      Vital Signs Last 24 Hrs  T(C): 36.9 (18 Oct 2017 06:00), Max: 36.9 (18 Oct 2017 06:00)  T(F): 98.4 (18 Oct 2017 06:00), Max: 98.4 (18 Oct 2017 06:00)  HR: 75 (18 Oct 2017 06:00) (74 - 91)  BP: 134/73 (18 Oct 2017 06:00) (111/63 - 134/73)  BP(mean): --  RR: 18 (18 Oct 2017 06:00) (18 - 18)  SpO2: 99% (18 Oct 2017 06:00) (98% - 99%)    PHYSICAL EXAM:    GENERAL: NAD, well-groomed, well-developed  HEAD:  Atraumatic, Normocephalic  EYES: EOMI, PERRLA, conjunctiva and sclera clear  ENMT: No tonsillar erythema, exudates, or enlargement; Moist mucous membranes, Good dentition, No lesions  NECK: Supple, No JVD, Normal thyroid  NERVOUS SYSTEM:  Alert & Oriented X3, Good concentration; Motor Strength 5/5 B/L upper and lower extremities; DTRs 2+ intact and symmetric  CHEST/LUNG: Clear to percussion bilaterally; No rales, rhonchi, wheezing, or rubs  HEART: Regular rate and rhythm; No murmurs, rubs, or gallops  ABDOMEN: Soft, Nontender, Nondistended; Bowel sounds present  EXTREMITIES:  2+ Peripheral Pulses, No clubbing, cyanosis, or edema  LYMPH: No lymphadenopathy noted  SKIN: No rashes or lesions    LABS:  CBC Full  -  ( 18 Oct 2017 06:30 )  WBC Count : 11.89 K/uL  Hemoglobin : 9.9 g/dL  Hematocrit : 30.0 %  Platelet Count - Automated : 292 K/uL  Mean Cell Volume : 88.5 fL  Mean Cell Hemoglobin : 29.2 pg  Mean Cell Hemoglobin Concentration : 33.0 %  Auto Neutrophil # : x  Auto Lymphocyte # : x  Auto Monocyte # : x  Auto Eosinophil # : x  Auto Basophil # : x  Auto Neutrophil % : x  Auto Lymphocyte % : x  Auto Monocyte % : x  Auto Eosinophil % : x  Auto Basophil % : x      10-18    140  |  103  |  40<H>  ----------------------------<  199<H>  4.0   |  21<L>  |  1.63<H>    Ca    8.9      18 Oct 2017 06:30  Phos  2.8     10-18  Mg     1.9     10-18      MICROBIOLOGY:    Blood Cultures:    Urine Cultures:          RADIOLOGY:    < from: VA Duplex Arterial Lower Ext, Right. (10.16.17 @ 08:21) >  Right Findings: No evidence of pseudoaneurysm visualized  in the right inguinal region.  The right common femoral, superficial femoral, and  proximal deep femoral arteries are otherwise patent, and  demonstrate normal velocities with triphasic waveforms. No  hemodynamically significant stenosis.  The right common femoral vein is patent and compressible  without evidence of thrombosis.  ------------------------------------------------------------------------  Summary/Impressions:  No pseudoaneurysm.  ------------------------------------------------------------------------  Confirmed on  10/16/2017 - 11:33 AM by Ajith Louise MD,    < end of copied text > Patient is a 71y old  Female who presents with a chief complaint of     HPI:    (Daughter present at bedside for translation)  71 year old Arabic speaking female with HTN, hyperlipidemia, DM-II, known CAD with 3V CABG (LIMA to LAD, SVG to OM, SVG to PDA) at [place] [address] who c/o chest pain with subsequent abnormal stress test.   In light of patients CAD history, symptoms and abnormal noninvasive test findings there is high suspicion for CAD progression. Patient is now referred to [facility] for a cardiac catheterization with possible PTCA/stent.     HOSPITAL COURSE:    Pt underwent cardiac catheterization showing closed SVG to OM and SVG to RPDA with patent LIMA-LAD.  She has an ostial RI lesion and is planned for staged PCI of ostial RI due to elevated Cr. Patient developed a hematoma post CAth on 10/13.  There was no evidence of pseudoaneurysm on arterial Doppler.  Pt has been afebrile and normotensive during her inpatient stay.  Her WBC is mildly elevated in the low teens.    Pt was recently treated with an "antiviral and antibiotic" about one month ago for a painful rash on right side of chin and mouth.  At that time patient was having fevers.  Currently, patient has occasional dry cough, and burning sensation in her chest.  She also c/o of slight burning on urination.    ID consult was called to evaluate for underlying infectious cause of leukocytosis.        REVIEW OF SYSTEMS:    CONSTITUTIONAL: No fever, weight loss, or fatigue  EYES: No eye pain, visual disturbances, or discharge  ENMT:  No sore throat  NECK: No pain or stiffness  RESPIRATORY: Occasional dry cough.  No wheezing, chills or hemoptysis; No shortness of breath  CARDIOVASCULAR: No chest pain, palpitations, dizziness, or leg swelling  GASTROINTESTINAL: No abdominal or epigastric pain. No nausea, vomiting, or hematemesis; No diarrhea or constipation. No melena or hematochezia.  GENITOURINARY: Mild dysuria.  No frequency, hematuria, or incontinence  NEUROLOGICAL: No headaches, memory loss, loss of strength, numbness, or tremors  SKIN: No itching, burning, rashes, or lesions   LYMPH NODES: No enlarged glands  MUSCULOSKELETAL: No joint pain or swelling; No muscle, back, or extremity pain      PAST MEDICAL & SURGICAL HISTORY:  GERD (gastroesophageal reflux disease)  CAD (coronary artery disease): s/p CABG  Hypothyroidism  Hyperlipidemia  Diabetes mellitus, type 2  S/P CABG (coronary artery bypass graft)      Allergies    No Known Allergies    Intolerances        FAMILY HISTORY:  No pertinent family history in first degree relatives      SOCIAL HISTORY:    .  No reported etoh, ivdu, smoking.    MEDICATIONS  (STANDING):  aspirin enteric coated 81 milliGRAM(s) Oral daily  atorvastatin 40 milliGRAM(s) Oral at bedtime  dextrose 5%. 1000 milliLiter(s) (50 mL/Hr) IV Continuous <Continuous>  dextrose 50% Injectable 12.5 Gram(s) IV Push once  dextrose 50% Injectable 25 Gram(s) IV Push once  dextrose 50% Injectable 25 Gram(s) IV Push once  docusate sodium 100 milliGRAM(s) Oral daily  famotidine    Tablet 20 milliGRAM(s) Oral daily  influenza   Vaccine 0.5 milliLiter(s) IntraMuscular once  insulin glargine Injectable (LANTUS) 65 Unit(s) SubCutaneous at bedtime  insulin lispro (HumaLOG) corrective regimen sliding scale   SubCutaneous Before meals and at bedtime  insulin lispro Injectable (HumaLOG) 24 Unit(s) SubCutaneous three times a day with meals  levothyroxine 50 MICROGram(s) Oral daily  metoprolol 25 milliGRAM(s) Oral two times a day  montelukast 10 milliGRAM(s) Oral at bedtime  senna 2 Tablet(s) Oral at bedtime  sodium bicarbonate  Infusion 0.23 mEq/kG/Hr (75 mL/Hr) IV Continuous <Continuous>  sodium bicarbonate  Infusion 0.612 mEq/kG/Hr (200 mL/Hr) IV Continuous <Continuous>  sodium bicarbonate  Infusion 0.23 mEq/kG/Hr (75 mL/Hr) IV Continuous <Continuous>  sodium chloride 0.9% lock flush 3 milliLiter(s) IV Push every 8 hours  sodium chloride 0.9%. 500 milliLiter(s) (100 mL/Hr) IV Continuous <Continuous>    MEDICATIONS  (PRN):  acetaminophen   Tablet. 650 milliGRAM(s) Oral every 6 hours PRN mild, moderate, severe pain  dextrose Gel 1 Dose(s) Oral once PRN Blood Glucose LESS THAN 70 milliGRAM(s)/deciliter  glucagon  Injectable 1 milliGRAM(s) IntraMuscular once PRN Glucose LESS THAN 70 milligrams/deciliter      Vital Signs Last 24 Hrs  T(C): 36.9 (18 Oct 2017 06:00), Max: 36.9 (18 Oct 2017 06:00)  T(F): 98.4 (18 Oct 2017 06:00), Max: 98.4 (18 Oct 2017 06:00)  HR: 75 (18 Oct 2017 06:00) (74 - 91)  BP: 134/73 (18 Oct 2017 06:00) (111/63 - 134/73)  BP(mean): --  RR: 18 (18 Oct 2017 06:00) (18 - 18)  SpO2: 99% (18 Oct 2017 06:00) (98% - 99%)    PHYSICAL EXAM:    GENERAL: NAD, well-groomed, well-developed  HEAD:  Atraumatic, Normocephalic  EYES: EOMI, PERRLA, conjunctiva and sclera clear  ENMT: No tonsillar erythema, exudates, or enlargement; Moist mucous membranes, Good dentition, No lesions  NECK: Supple, No JVD, Normal thyroid  NERVOUS SYSTEM:  Alert & Oriented X3, Good concentration; Motor Strength 5/5 B/L upper and lower extremities; DTRs 2+ intact and symmetric  CHEST/LUNG: Clear to percussion bilaterally; No rales, rhonchi, wheezing, or rubs  HEART: Regular rate and rhythm; No murmurs, rubs, or gallops  ABDOMEN: Soft, Nontender, Nondistended; Bowel sounds present  EXTREMITIES:  2+ Peripheral Pulses, No clubbing, cyanosis, or edema  LYMPH: No lymphadenopathy noted  SKIN: No rashes or lesions.  Right groin hematoma, no fluctuance or TTP.  Healed scars on lower left chin    LABS:  CBC Full  -  ( 18 Oct 2017 06:30 )  WBC Count : 11.89 K/uL  Hemoglobin : 9.9 g/dL  Hematocrit : 30.0 %  Platelet Count - Automated : 292 K/uL  Mean Cell Volume : 88.5 fL  Mean Cell Hemoglobin : 29.2 pg  Mean Cell Hemoglobin Concentration : 33.0 %  Auto Neutrophil # : x  Auto Lymphocyte # : x  Auto Monocyte # : x  Auto Eosinophil # : x  Auto Basophil # : x  Auto Neutrophil % : x  Auto Lymphocyte % : x  Auto Monocyte % : x  Auto Eosinophil % : x  Auto Basophil % : x      10-18    140  |  103  |  40<H>  ----------------------------<  199<H>  4.0   |  21<L>  |  1.63<H>    Ca    8.9      18 Oct 2017 06:30  Phos  2.8     10-18  Mg     1.9     10-18      MICROBIOLOGY:    Blood Cultures:    Urine Cultures:          RADIOLOGY:    < from: VA Duplex Arterial Lower Ext, Right. (10.16.17 @ 08:21) >  Right Findings: No evidence of pseudoaneurysm visualized  in the right inguinal region.  The right common femoral, superficial femoral, and  proximal deep femoral arteries are otherwise patent, and  demonstrate normal velocities with triphasic waveforms. No  hemodynamically significant stenosis.  The right common femoral vein is patent and compressible  without evidence of thrombosis.  ------------------------------------------------------------------------  Summary/Impressions:  No pseudoaneurysm.  ------------------------------------------------------------------------  Confirmed on  10/16/2017 - 11:33 AM by Ajith Louise MD,    < end of copied text >

## 2017-10-18 NOTE — CONSULT NOTE ADULT - ASSESSMENT
* Leukocytosis    Recommend:    - Add CBC with differential  - Add Liver function tests, Amylase, Lipase  - Add inflammatory markers ESR, CRP  - Check baseline chest xray  - Urinanalysis    Will follow.    Joselin Roman  [phone] 71 year old French speaking female with HTN, hyperlipidemia, DM-II, known CAD with 3V CABG (LIMA to LAD, SVG to OM, SVG to PDA) at [facility] 2014 who c/o chest pain with subsequent abnormal stress test.   Plan for sequential cardiac catheterization.  Found to have mildly elevated WBC.  Pt recently treated for what sounds like shingles, about one month ago.  Currently c/o mild dysuria and occasional cough, and burning sensation in chest.  No fevers.    * Leukocytosis    Recommend:    - Add CBC with differential  - Add Liver function tests, Amylase, Lipase  - Add inflammatory markers ESR, CRP  - Check baseline chest xray  - Urinanalysis    Thank you for opportunity to participate in the care of this patient.  Will follow.    Joselin Roman  [phone]

## 2017-10-18 NOTE — PROGRESS NOTE ADULT - SUBJECTIVE AND OBJECTIVE BOX
Dr. Muhammad (Nephrology)  Office [phone]  Cell [phone]  [facility]  Cell [phone]      Patient is a 71y old  Female who presents with a chief complaint of     Patient seen and examined at bedside. No chest pain/sob    VITALS:  T(F): 98.4 (10-18-17 @ 06:00), Max: 98.4 (10-18-17 @ 06:00)  HR: 75 (10-18-17 @ 06:00)  BP: 134/73 (10-18-17 @ 06:00)  RR: 18 (10-18-17 @ 06:00)  SpO2: 99% (10-18-17 @ 06:00)  Wt(kg): --        PHYSICAL EXAM:  Constitutional: NAD  Neck: No JVD  Respiratory: CTAB, no wheezes, rales or rhonchi  Cardiovascular: S1, S2, RRR  Gastrointestinal: BS+, soft, NT/ND  Extremities: No peripheral edema    Hospital Medications:   MEDICATIONS  (STANDING):  aspirin enteric coated 81 milliGRAM(s) Oral daily  atorvastatin 40 milliGRAM(s) Oral at bedtime  dextrose 5%. 1000 milliLiter(s) (50 mL/Hr) IV Continuous <Continuous>  dextrose 50% Injectable 12.5 Gram(s) IV Push once  dextrose 50% Injectable 25 Gram(s) IV Push once  dextrose 50% Injectable 25 Gram(s) IV Push once  docusate sodium 100 milliGRAM(s) Oral daily  famotidine    Tablet 20 milliGRAM(s) Oral daily  influenza   Vaccine 0.5 milliLiter(s) IntraMuscular once  insulin glargine Injectable (LANTUS) 65 Unit(s) SubCutaneous at bedtime  insulin lispro (HumaLOG) corrective regimen sliding scale   SubCutaneous Before meals and at bedtime  insulin lispro Injectable (HumaLOG) 24 Unit(s) SubCutaneous three times a day with meals  levothyroxine 50 MICROGram(s) Oral daily  metoprolol 25 milliGRAM(s) Oral two times a day  montelukast 10 milliGRAM(s) Oral at bedtime  senna 2 Tablet(s) Oral at bedtime  sodium bicarbonate  Infusion 0.23 mEq/kG/Hr (75 mL/Hr) IV Continuous <Continuous>  sodium bicarbonate  Infusion 0.612 mEq/kG/Hr (200 mL/Hr) IV Continuous <Continuous>  sodium bicarbonate  Infusion 0.23 mEq/kG/Hr (75 mL/Hr) IV Continuous <Continuous>  sodium chloride 0.9% lock flush 3 milliLiter(s) IV Push every 8 hours  sodium chloride 0.9%. 500 milliLiter(s) (100 mL/Hr) IV Continuous <Continuous>      LABS:  10-18    140  |  103  |  40<H>  ----------------------------<  199<H>  4.0   |  21<L>  |  1.63<H>    Ca    8.9      18 Oct 2017 06:30  Phos  2.8     10-18  Mg     1.9     10-18      Creatinine Trend: 1.63 <--, 1.76 <--, 1.81 <--, 1.71 <--, 1.49 <--, 1.53 <--    Phosphorus Level, Serum: 2.8 mg/dL (10-18 @ 06:30)                              9.9    11.89 )-----------( 292      ( 18 Oct 2017 06:30 )             30.0     Urine Studies:  Urinalysis - [10-18-17 @ 11:15]      Color PLYEL / Appearance CLEAR / SG 1.014 / pH 6.5      Gluc 500 / Ketone NEGATIVE  / Bili NEGATIVE / Urobili NORMAL       Blood NEGATIVE / Protein 100 / Leuk Est NEGATIVE / Nitrite NEGATIVE      RBC 0-2 / WBC 0-2 / Hyaline  / Gran  / Sq Epi OCC / Non Sq Epi  / Bacteria       HbA1c 10.4      [10-13-17 @ 08:45]        RADIOLOGY & ADDITIONAL STUDIES:

## 2017-10-18 NOTE — PROGRESS NOTE ADULT - SUBJECTIVE AND OBJECTIVE BOX
Subjective: Denies chest pain / shortness of breath     MEDICATIONS  (STANDING):  aspirin enteric coated 81 milliGRAM(s) Oral daily  atorvastatin 40 milliGRAM(s) Oral at bedtime  docusate sodium 100 milliGRAM(s) Oral daily  famotidine    Tablet 20 milliGRAM(s) Oral daily  influenza   Vaccine 0.5 milliLiter(s) IntraMuscular once  insulin glargine Injectable (LANTUS) 65 Unit(s) SubCutaneous at bedtime  insulin lispro (HumaLOG) corrective regimen sliding scale   SubCutaneous Before meals and at bedtime  insulin lispro Injectable (HumaLOG) 24 Unit(s) SubCutaneous three times a day with meals  levothyroxine 50 MICROGram(s) Oral daily  metoprolol 25 milliGRAM(s) Oral two times a day  montelukast 10 milliGRAM(s) Oral at bedtime  senna 2 Tablet(s) Oral at bedtime    LABS:                        9.9    11.89 )-----------( 292      ( 18 Oct 2017 06:30 )             30.0     140  |  103  |  40<H>  ----------------------------<  199<H>  4.0   |  21<L>  |  1.63<H>    Ca    8.9      18 Oct 2017 06:30  Phos  2.8     10-18  Mg     1.9     10-18  Creatinine Trend: 1.63<--, 1.76<--, 1.81<--, 1.71<--, 1.49<--, 1.53<--     PHYSICAL EXAM  Vital Signs Last 24 Hrs  T(C): 36.9 (18 Oct 2017 06:00), Max: 36.9 (18 Oct 2017 06:00)  T(F): 98.4 (18 Oct 2017 06:00), Max: 98.4 (18 Oct 2017 06:00)  HR: 75 (18 Oct 2017 06:00) (74 - 91)  BP: 134/73 (18 Oct 2017 06:00) (111/63 - 134/73)  RR: 18 (18 Oct 2017 06:00) (18 - 18)  SpO2: 99% (18 Oct 2017 06:00) (98% - 99%)    Cardiovascular: Normal S1 S2,  RRR   No JVD, 1/6 YUSUF murmur, Peripheral pulses palpable 2+ bilaterally  Respiratory: Lungs clear to auscultation, normal effort 	  Gastrointestinal:  Soft, Non-tender, + BS	  Extremities no edema, cyanosis, clubbing B/L LE's R groin + hematoma  s/p compression  soft non tender no signs of active bleed     TELEMETRY: SR     DIAGNOSTIC TESTING:    Catheterization: LVEDP 24, ostial Ramus ; SVG to Ramus down, SVG to PDA down, LIMA to LAD patent;     < from: VA Duplex Arterial Lower Ext, Right. (10.16.17 @ 08:21) >  Summary/Impressions:  No pseudoaneurysm.  ------------------------------------------------------------------------  Confirmed on  10/16/2017 - 11:33 AM by Ajith Louise MD,    < end of copied text >    ASSESSMENT/PLAN: 71y Female with HTN, hyperlipidemia, DM-II, known CAD with 3V CABG (LIMA to LAD, SVG to OM, SVG to PDA) at [facility] 2014 who c/o chest pain with subsequent abnormal stress test.     --s/p CATH w/ LVEDP 24, ostial Ramus ; SVG to Ramus down, SVG to PDA down, LIMA to LAD patent;   ---RLE doppler 10/16 --NO pseudoaneurysm  --plan for PCI to Ramus this week once optimized  --leukocytosis, f/u Urine Cx and CXR reports    Juliane Fitch PA-C  [facility]  [address], [address]   [place] [address]  office [phone]  pager [phone]

## 2017-10-19 DIAGNOSIS — I25.10 ATHEROSCLEROTIC HEART DISEASE OF NATIVE CORONARY ARTERY WITHOUT ANGINA PECTORIS: ICD-10-CM

## 2017-10-19 DIAGNOSIS — E03.9 HYPOTHYROIDISM, UNSPECIFIED: ICD-10-CM

## 2017-10-19 DIAGNOSIS — E11.9 TYPE 2 DIABETES MELLITUS WITHOUT COMPLICATIONS: ICD-10-CM

## 2017-10-19 LAB
ALBUMIN SERPL ELPH-MCNC: 3.6 G/DL — SIGNIFICANT CHANGE UP (ref 3.3–5)
ALP SERPL-CCNC: 72 U/L — SIGNIFICANT CHANGE UP (ref 40–120)
ALT FLD-CCNC: 30 U/L — SIGNIFICANT CHANGE UP (ref 4–33)
AMYLASE P1 CFR SERPL: 84 U/L — SIGNIFICANT CHANGE UP (ref 25–125)
AST SERPL-CCNC: 43 U/L — HIGH (ref 4–32)
BILIRUB DIRECT SERPL-MCNC: 0.1 MG/DL — SIGNIFICANT CHANGE UP (ref 0.1–0.2)
BILIRUB SERPL-MCNC: 0.6 MG/DL — SIGNIFICANT CHANGE UP (ref 0.2–1.2)
BUN SERPL-MCNC: 33 MG/DL — HIGH (ref 7–23)
CALCIUM SERPL-MCNC: 9.1 MG/DL — SIGNIFICANT CHANGE UP (ref 8.4–10.5)
CHLORIDE SERPL-SCNC: 104 MMOL/L — SIGNIFICANT CHANGE UP (ref 98–107)
CO2 SERPL-SCNC: 19 MMOL/L — LOW (ref 22–31)
CREAT SERPL-MCNC: 1.34 MG/DL — HIGH (ref 0.5–1.3)
CRP SERPL-MCNC: 5.3 MG/L — HIGH
ERYTHROCYTE [SEDIMENTATION RATE] IN BLOOD: 101 MM/HR — HIGH (ref 4–25)
GLUCOSE SERPL-MCNC: 157 MG/DL — HIGH (ref 70–99)
HCT VFR BLD CALC: 30.4 % — LOW (ref 34.5–45)
HGB BLD-MCNC: 9.9 G/DL — LOW (ref 11.5–15.5)
LIDOCAIN IGE QN: 101.8 U/L — HIGH (ref 7–60)
MCHC RBC-ENTMCNC: 29.1 PG — SIGNIFICANT CHANGE UP (ref 27–34)
MCHC RBC-ENTMCNC: 32.6 % — SIGNIFICANT CHANGE UP (ref 32–36)
MCV RBC AUTO: 89.4 FL — SIGNIFICANT CHANGE UP (ref 80–100)
NRBC # FLD: 0 — SIGNIFICANT CHANGE UP
PLATELET # BLD AUTO: 315 K/UL — SIGNIFICANT CHANGE UP (ref 150–400)
PMV BLD: 9.1 FL — SIGNIFICANT CHANGE UP (ref 7–13)
POTASSIUM SERPL-MCNC: 4.2 MMOL/L — SIGNIFICANT CHANGE UP (ref 3.5–5.3)
POTASSIUM SERPL-SCNC: 4.2 MMOL/L — SIGNIFICANT CHANGE UP (ref 3.5–5.3)
PROT SERPL-MCNC: 7.2 G/DL — SIGNIFICANT CHANGE UP (ref 6–8.3)
RBC # BLD: 3.4 M/UL — LOW (ref 3.8–5.2)
RBC # FLD: 14.1 % — SIGNIFICANT CHANGE UP (ref 10.3–14.5)
SODIUM SERPL-SCNC: 140 MMOL/L — SIGNIFICANT CHANGE UP (ref 135–145)
WBC # BLD: 12.59 K/UL — HIGH (ref 3.8–10.5)
WBC # FLD AUTO: 12.59 K/UL — HIGH (ref 3.8–10.5)

## 2017-10-19 RX ORDER — FAMOTIDINE 10 MG/ML
20 INJECTION INTRAVENOUS ONCE
Qty: 0 | Refills: 0 | Status: COMPLETED | OUTPATIENT
Start: 2017-10-19 | End: 2017-10-19

## 2017-10-19 RX ORDER — SODIUM BICARBONATE 1 MEQ/ML
0.23 SYRINGE (ML) INTRAVENOUS
Qty: 150 | Refills: 0 | Status: DISCONTINUED | OUTPATIENT
Start: 2017-10-19 | End: 2017-10-20

## 2017-10-19 RX ORDER — ACETYLCYSTEINE 200 MG/ML
1200 VIAL (ML) MISCELLANEOUS EVERY 12 HOURS
Qty: 0 | Refills: 0 | Status: DISCONTINUED | OUTPATIENT
Start: 2017-10-19 | End: 2017-10-20

## 2017-10-19 RX ORDER — SODIUM BICARBONATE 1 MEQ/ML
0.61 SYRINGE (ML) INTRAVENOUS
Qty: 150 | Refills: 0 | Status: COMPLETED | OUTPATIENT
Start: 2017-10-19 | End: 2017-10-19

## 2017-10-19 RX ADMIN — SODIUM CHLORIDE 3 MILLILITER(S): 9 INJECTION INTRAMUSCULAR; INTRAVENOUS; SUBCUTANEOUS at 13:00

## 2017-10-19 RX ADMIN — ATORVASTATIN CALCIUM 40 MILLIGRAM(S): 80 TABLET, FILM COATED ORAL at 22:23

## 2017-10-19 RX ADMIN — Medication 25 MILLIGRAM(S): at 17:47

## 2017-10-19 RX ADMIN — Medication 50 MICROGRAM(S): at 05:56

## 2017-10-19 RX ADMIN — Medication 2: at 13:00

## 2017-10-19 RX ADMIN — FAMOTIDINE 20 MILLIGRAM(S): 10 INJECTION INTRAVENOUS at 12:59

## 2017-10-19 RX ADMIN — Medication 100 MILLIGRAM(S): at 12:59

## 2017-10-19 RX ADMIN — Medication 24 UNIT(S): at 12:59

## 2017-10-19 RX ADMIN — INSULIN GLARGINE 65 UNIT(S): 100 INJECTION, SOLUTION SUBCUTANEOUS at 22:23

## 2017-10-19 RX ADMIN — Medication 81 MILLIGRAM(S): at 12:59

## 2017-10-19 RX ADMIN — Medication 2: at 22:22

## 2017-10-19 RX ADMIN — SODIUM CHLORIDE 3 MILLILITER(S): 9 INJECTION INTRAMUSCULAR; INTRAVENOUS; SUBCUTANEOUS at 22:24

## 2017-10-19 RX ADMIN — Medication 25 MILLIGRAM(S): at 05:56

## 2017-10-19 RX ADMIN — SODIUM CHLORIDE 3 MILLILITER(S): 9 INJECTION INTRAMUSCULAR; INTRAVENOUS; SUBCUTANEOUS at 05:57

## 2017-10-19 RX ADMIN — Medication 24 UNIT(S): at 09:46

## 2017-10-19 RX ADMIN — SENNA PLUS 2 TABLET(S): 8.6 TABLET ORAL at 22:24

## 2017-10-19 RX ADMIN — MONTELUKAST 10 MILLIGRAM(S): 4 TABLET, CHEWABLE ORAL at 22:23

## 2017-10-19 RX ADMIN — Medication 1: at 09:45

## 2017-10-19 RX ADMIN — Medication 24 UNIT(S): at 18:38

## 2017-10-19 RX ADMIN — Medication 1200 MILLIGRAM(S): at 17:47

## 2017-10-19 NOTE — PROGRESS NOTE ADULT - SUBJECTIVE AND OBJECTIVE BOX
Patient is a 71y old  Female who presents with a chief complaint of     INTERVAL HPI/OVERNIGHT EVENTS:  T(C): 36.6 (10-19-17 @ 13:15), Max: 36.9 (10-18-17 @ 23:06)  HR: 97 (10-19-17 @ 17:46) (74 - 97)  BP: 129/69 (10-19-17 @ 17:46) (123/65 - 145/82)  RR: 18 (10-19-17 @ 13:15) (18 - 18)  SpO2: 100% (10-19-17 @ 13:15) (97% - 100%)  Wt(kg): --  I&O's Summary      LABS:                        9.9    12.59 )-----------( 315      ( 19 Oct 2017 06:32 )             30.4     10    140  |  104  |  33<H>  ----------------------------<  157<H>  4.2   |  19<L>  |  1.34<H>    Ca    9.1      19 Oct 2017 06:32  Phos  2.8     10-18  Mg     1.9     10-18    TPro  7.2  /  Alb  3.6  /  TBili  0.6  /  DBili  0.1  /  AST  43<H>  /  ALT  30  /  AlkPhos  72  10-      Urinalysis Basic - ( 18 Oct 2017 11:15 )    Color: PLYEL / Appearance: CLEAR / S.014 / pH: 6.5  Gluc: 500 / Ketone: NEGATIVE  / Bili: NEGATIVE / Urobili: NORMAL E.U.   Blood: NEGATIVE / Protein: 100 / Nitrite: NEGATIVE   Leuk Esterase: NEGATIVE / RBC: 0-2 / WBC 0-2   Sq Epi: OCC / Non Sq Epi: x / Bacteria: x      CAPILLARY BLOOD GLUCOSE      POCT Blood Glucose.: 146 mg/dL (19 Oct 2017 17:21)  POCT Blood Glucose.: 205 mg/dL (19 Oct 2017 12:33)  POCT Blood Glucose.: 162 mg/dL (19 Oct 2017 09:03)  POCT Blood Glucose.: 307 mg/dL (18 Oct 2017 21:58)        Urinalysis Basic - ( 18 Oct 2017 11:15 )    Color: PLYEL / Appearance: CLEAR / S.014 / pH: 6.5  Gluc: 500 / Ketone: NEGATIVE  / Bili: NEGATIVE / Urobili: NORMAL E.U.   Blood: NEGATIVE / Protein: 100 / Nitrite: NEGATIVE   Leuk Esterase: NEGATIVE / RBC: 0-2 / WBC 0-2   Sq Epi: OCC / Non Sq Epi: x / Bacteria: x        MEDICATIONS  (STANDING):  acetylcysteine  Oral Solution 1200 milliGRAM(s) Oral every 12 hours  aspirin enteric coated 81 milliGRAM(s) Oral daily  atorvastatin 40 milliGRAM(s) Oral at bedtime  dextrose 5%. 1000 milliLiter(s) (50 mL/Hr) IV Continuous <Continuous>  dextrose 50% Injectable 12.5 Gram(s) IV Push once  dextrose 50% Injectable 25 Gram(s) IV Push once  dextrose 50% Injectable 25 Gram(s) IV Push once  docusate sodium 100 milliGRAM(s) Oral daily  famotidine    Tablet 20 milliGRAM(s) Oral daily  influenza   Vaccine 0.5 milliLiter(s) IntraMuscular once  insulin glargine Injectable (LANTUS) 65 Unit(s) SubCutaneous at bedtime  insulin lispro (HumaLOG) corrective regimen sliding scale   SubCutaneous Before meals and at bedtime  insulin lispro Injectable (HumaLOG) 24 Unit(s) SubCutaneous three times a day with meals  levothyroxine 50 MICROGram(s) Oral daily  metoprolol 25 milliGRAM(s) Oral two times a day  montelukast 10 milliGRAM(s) Oral at bedtime  senna 2 Tablet(s) Oral at bedtime  sodium bicarbonate  Infusion 0.612 mEq/kG/Hr (200 mL/Hr) IV Continuous <Continuous>  sodium bicarbonate  Infusion 0.23 mEq/kG/Hr (75 mL/Hr) IV Continuous <Continuous>  sodium bicarbonate  Infusion 0.23 mEq/kG/Hr (75 mL/Hr) IV Continuous <Continuous>  sodium chloride 0.9% lock flush 3 milliLiter(s) IV Push every 8 hours  sodium chloride 0.9%. 500 milliLiter(s) (100 mL/Hr) IV Continuous <Continuous>    MEDICATIONS  (PRN):  acetaminophen   Tablet. 650 milliGRAM(s) Oral every 6 hours PRN mild, moderate, severe pain  dextrose Gel 1 Dose(s) Oral once PRN Blood Glucose LESS THAN 70 milliGRAM(s)/deciliter  glucagon  Injectable 1 milliGRAM(s) IntraMuscular once PRN Glucose LESS THAN 70 milligrams/deciliter          PHYSICAL EXAM:  GENERAL: NAD, well-groomed, well-developed  HEAD:  Atraumatic, Normocephalic  CHEST/LUNG: Clear to percussion bilaterally; No rales, rhonchi, wheezing, or rubs  HEART: Regular rate and rhythm; No murmurs, rubs, or gallops  ABDOMEN: Soft, Nontender, Nondistended; Bowel sounds present  EXTREMITIES:  2+ Peripheral Pulses, No clubbing, cyanosis, or edema  LYMPH: No lymphadenopathy noted  SKIN: No rashes or lesions    Care Discussed with Consultants/Other Providers [+ ] YES  [ ] NO

## 2017-10-19 NOTE — CONSULT NOTE ADULT - SUBJECTIVE AND OBJECTIVE BOX
HPI:  71 year old Hebrew speaking female with HTN, hyperlipidemia, DM-II, known CAD with 3V CABG (LIMA to LAD, SVG to OM, SVG to PDA) at [place] [address] who c/o chest pain with subsequent abnormal stress test.   In light of patients CAD history, symptoms and abnormal noninvasive test findings there is high suspicion for CAD progression. Patient is now referred to [facility] for a cardiac catheterization with possible PTCA/stent.     please see hard copy H&P in paper chart  PATIENT SEEN AND EXAMINED AND NO NEW CLINICAL CHANGES SINCE office visit (13 Oct 2017 09:08)  Patient has history of diabetes, on insulin at home, no recent hypoglycemic episodes, no polyuria polydipsia. Patient follows up with PCP for diabetes management.    PAST MEDICAL & SURGICAL HISTORY:  GERD (gastroesophageal reflux disease)  CAD (coronary artery disease): s/p CABG  Hypothyroidism  Hyperlipidemia  Diabetes mellitus, type 2  S/P CABG (coronary artery bypass graft)      FAMILY HISTORY:  No pertinent family history in first degree relatives      Social History:    Outpatient Medications:    MEDICATIONS  (STANDING):  acetylcysteine  Oral Solution 1200 milliGRAM(s) Oral every 12 hours  aspirin enteric coated 81 milliGRAM(s) Oral daily  atorvastatin 40 milliGRAM(s) Oral at bedtime  dextrose 5%. 1000 milliLiter(s) (50 mL/Hr) IV Continuous <Continuous>  dextrose 50% Injectable 12.5 Gram(s) IV Push once  dextrose 50% Injectable 25 Gram(s) IV Push once  dextrose 50% Injectable 25 Gram(s) IV Push once  docusate sodium 100 milliGRAM(s) Oral daily  famotidine    Tablet 20 milliGRAM(s) Oral daily  famotidine Injectable 20 milliGRAM(s) IV Push once  influenza   Vaccine 0.5 milliLiter(s) IntraMuscular once  insulin glargine Injectable (LANTUS) 65 Unit(s) SubCutaneous at bedtime  insulin lispro (HumaLOG) corrective regimen sliding scale   SubCutaneous Before meals and at bedtime  insulin lispro Injectable (HumaLOG) 24 Unit(s) SubCutaneous three times a day with meals  levothyroxine 50 MICROGram(s) Oral daily  metoprolol 25 milliGRAM(s) Oral two times a day  montelukast 10 milliGRAM(s) Oral at bedtime  senna 2 Tablet(s) Oral at bedtime  sodium bicarbonate  Infusion 0.612 mEq/kG/Hr (200 mL/Hr) IV Continuous <Continuous>  sodium bicarbonate  Infusion 0.23 mEq/kG/Hr (75 mL/Hr) IV Continuous <Continuous>  sodium bicarbonate  Infusion 0.23 mEq/kG/Hr (75 mL/Hr) IV Continuous <Continuous>  sodium chloride 0.9% lock flush 3 milliLiter(s) IV Push every 8 hours  sodium chloride 0.9%. 500 milliLiter(s) (100 mL/Hr) IV Continuous <Continuous>    MEDICATIONS  (PRN):  acetaminophen   Tablet. 650 milliGRAM(s) Oral every 6 hours PRN mild, moderate, severe pain  dextrose Gel 1 Dose(s) Oral once PRN Blood Glucose LESS THAN 70 milliGRAM(s)/deciliter  glucagon  Injectable 1 milliGRAM(s) IntraMuscular once PRN Glucose LESS THAN 70 milligrams/deciliter      Allergies    No Known Allergies    Intolerances      Review of Systems:  Constitutional: No fever, no chills  Eyes: No blurry vision  Neuro: No tremors  HEENT: No pain, no neck swelling  Cardiovascular: No chest pain, no palpitations  Respiratory: Has SOB, no cough  GI: No nausea, vomiting, abdominal pain  : No dysuria  Skin: no rash  MSK: Has leg swelling, no foot ulcers.  Psych: no depression  Endocrine: no polyuria, polydipsia    ALL OTHER SYSTEMS REVIEWED AND NEGATIVE    UNABLE TO OBTAIN    PHYSICAL EXAM:  VITALS: T(C): 36.6 (10-19-17 @ 13:15)  T(F): 97.9 (10-19-17 @ 13:15), Max: 98.4 (10-18-17 @ 23:06)  HR: 97 (10-19-17 @ 17:46) (74 - 97)  BP: 129/69 (10-19-17 @ 17:46) (123/65 - 145/82)  RR:  (18 - 18)  SpO2:  (97% - 100%)  Wt(kg): --  GENERAL: NAD, well-groomed, well-developed  EYES: No proptosis, no lid lag  HEENT:  Atraumatic, Normocephalic  THYROID: Normal size, no palpable nodules  RESPIRATORY: Clear to auscultation bilaterally; No rales, rhonchi, wheezing  CARDIOVASCULAR: Si S2, No murmurs;  GI: Soft, non distended, normal bowel sounds  SKIN: Dry, intact, No rashes or lesions  MUSCULOSKELETAL: Has BL lower extremity edema.  NEURO:  no tremor, sensation decreased in feet BL,    CAPILLARY BLOOD GLUCOSE  254 (10-18 @ 12:25)  216 (10-18 @ 08:59)  274 (10-16 @ 22:03)                            9.9    12.59 )-----------( 315      ( 19 Oct 2017 06:32 )             30.4       10-19    140  |  104  |  33<H>  ----------------------------<  157<H>  4.2   |  19<L>  |  1.34<H>    EGFR if : 47  EGFR if non : 40    Ca    9.1      10-19  Mg     1.9     10-18  Phos  2.8     10-18    TPro  7.2  /  Alb  3.6  /  TBili  0.6  /  DBili  0.1  /  AST  43<H>  /  ALT  30  /  AlkPhos  72  10-19      Thyroid Function Tests:      Hemoglobin A1C, Whole Blood: 10.4 % <H> [4.0 - 5.6] (10-13-17 @ 08:45)          Radiology:

## 2017-10-19 NOTE — PROGRESS NOTE ADULT - SUBJECTIVE AND OBJECTIVE BOX
Infectious Diseases progress note:    Subjective:  No new complaints.  Denies cough, dysuria, abd pain, fever/chills.  No recent weight loss.  WBC slightly elevated today.  Family members at bedside.    ROS:  CONSTITUTIONAL:  No fever, chills, rigors  CARDIOVASCULAR:  No chest pain or palpitations  RESPIRATORY:   No SOB, cough, dyspnea on exertion.  No wheezing  GASTROINTESTINAL:  No abd pain, N/V, diarrhea/constipation  EXTREMITIES:  No swelling or joint pain  GENITOURINARY:  No burning on urination, increased frequency or urgency.  No flank pain  NEUROLOGIC:  No HA, visual disturbances  SKIN: No rashes    Allergies    No Known Allergies    Intolerances        ANTIBIOTICS/RELEVANT:  antimicrobials    immunologic:  influenza   Vaccine 0.5 milliLiter(s) IntraMuscular once    OTHER:  acetaminophen   Tablet. 650 milliGRAM(s) Oral every 6 hours PRN  aspirin enteric coated 81 milliGRAM(s) Oral daily  atorvastatin 40 milliGRAM(s) Oral at bedtime  dextrose 5%. 1000 milliLiter(s) IV Continuous <Continuous>  dextrose 50% Injectable 12.5 Gram(s) IV Push once  dextrose 50% Injectable 25 Gram(s) IV Push once  dextrose 50% Injectable 25 Gram(s) IV Push once  dextrose Gel 1 Dose(s) Oral once PRN  docusate sodium 100 milliGRAM(s) Oral daily  famotidine    Tablet 20 milliGRAM(s) Oral daily  glucagon  Injectable 1 milliGRAM(s) IntraMuscular once PRN  insulin glargine Injectable (LANTUS) 65 Unit(s) SubCutaneous at bedtime  insulin lispro (HumaLOG) corrective regimen sliding scale   SubCutaneous Before meals and at bedtime  insulin lispro Injectable (HumaLOG) 24 Unit(s) SubCutaneous three times a day with meals  levothyroxine 50 MICROGram(s) Oral daily  metoprolol 25 milliGRAM(s) Oral two times a day  montelukast 10 milliGRAM(s) Oral at bedtime  senna 2 Tablet(s) Oral at bedtime  sodium bicarbonate  Infusion 0.23 mEq/kG/Hr IV Continuous <Continuous>  sodium bicarbonate  Infusion 0.612 mEq/kG/Hr IV Continuous <Continuous>  sodium bicarbonate  Infusion 0.23 mEq/kG/Hr IV Continuous <Continuous>  sodium chloride 0.9% lock flush 3 milliLiter(s) IV Push every 8 hours  sodium chloride 0.9%. 500 milliLiter(s) IV Continuous <Continuous>      Objective:  Vital Signs Last 24 Hrs  T(C): 36.9 (19 Oct 2017 05:55), Max: 37 (18 Oct 2017 14:27)  T(F): 98.4 (19 Oct 2017 05:55), Max: 98.6 (18 Oct 2017 14:27)  HR: 74 (19 Oct 2017 05:55) (74 - 95)  BP: 123/65 (19 Oct 2017 05:55) (121/66 - 145/82)  BP(mean): --  RR: 18 (19 Oct 2017 05:55) (18 - 18)  SpO2: 98% (19 Oct 2017 05:55) (97% - 98%)    PHYSICAL EXAM:    Constitutional:NAD  Eyes:MOHSEN, EOMI  Ear/Nose/Throat: no oral lesion, no sinus tenderness on percussion	  Neck:no JVD, no lymphadenopathy, supple  Respiratory: CTA maribel  Cardiovascular: S1S2 RRR, no murmurs  Gastrointestinal:soft, (+) BS, no HSM  Extremities:no e/e/c        LABS:                        9.9    12.59 )-----------( 315      ( 19 Oct 2017 06:32 )             30.4     10-19    140  |  104  |  33<H>  ----------------------------<  157<H>  4.2   |  19<L>  |  1.34<H>    Ca    9.1      19 Oct 2017 06:32  Phos  2.8     10-18  Mg     1.9     10-18    TPro  7.2  /  Alb  3.6  /  TBili  0.6  /  DBili  0.1  /  AST  43<H>  /  ALT  30  /  AlkPhos  72  10-19      Urinalysis Basic - ( 18 Oct 2017 11:15 )    Color: PLYEL / Appearance: CLEAR / S.014 / pH: 6.5  Gluc: 500 / Ketone: NEGATIVE  / Bili: NEGATIVE / Urobili: NORMAL E.U.   Blood: NEGATIVE / Protein: 100 / Nitrite: NEGATIVE   Leuk Esterase: NEGATIVE / RBC: 0-2 / WBC 0-2   Sq Epi: OCC / Non Sq Epi: x / Bacteria: x          MICROBIOLOGY:          RADIOLOGY & ADDITIONAL STUDIES:    < from: Xray Chest 1 View AP- PORTABLE-Urgent (10.18.17 @ 11:05) >    EXAM:  RAD CHEST PORTABLE URGENT        PROCEDURE DATE:  Oct 18 2017         INTERPRETATION:    CLINICAL INDICATION: Leukocytosis.    TECHNIQUE: Portable frontal view of the chest.    COMPARISON: Chest radiograph 3/31/2015.    FINDINGS:     Cardiacsize is within normal limits.  Lungs are clear.  No pleural effusions or pneumothorax.  No acute osseous abnormality.      IMPRESSION:   Clear lungs.     < end of copied text >

## 2017-10-19 NOTE — PROGRESS NOTE ADULT - SUBJECTIVE AND OBJECTIVE BOX
Dr. Muhammad (Nephrology)  Office [phone]  Cell [phone]  [facility]  Cell [phone]      Patient is a 71y old  Female who presents with a chief complaint of     Patient seen and examined at bedside. No chest pain/sob    VITALS:  T(F): 98.4 (10-19-17 @ 05:55), Max: 98.6 (10-18-17 @ 14:27)  HR: 74 (10-19-17 @ 05:55)  BP: 123/65 (10-19-17 @ 05:55)  RR: 18 (10-19-17 @ 05:55)  SpO2: 98% (10-19-17 @ 05:55)  Wt(kg): --        PHYSICAL EXAM:  Constitutional: NAD  Neck: No JVD  Respiratory: CTAB, no wheezes, rales or rhonchi  Cardiovascular: S1, S2, RRR  Gastrointestinal: BS+, soft, NT/ND  Extremities: No peripheral edema    Hospital Medications:   MEDICATIONS  (STANDING):  aspirin enteric coated 81 milliGRAM(s) Oral daily  atorvastatin 40 milliGRAM(s) Oral at bedtime  dextrose 5%. 1000 milliLiter(s) (50 mL/Hr) IV Continuous <Continuous>  dextrose 50% Injectable 12.5 Gram(s) IV Push once  dextrose 50% Injectable 25 Gram(s) IV Push once  dextrose 50% Injectable 25 Gram(s) IV Push once  docusate sodium 100 milliGRAM(s) Oral daily  famotidine    Tablet 20 milliGRAM(s) Oral daily  influenza   Vaccine 0.5 milliLiter(s) IntraMuscular once  insulin glargine Injectable (LANTUS) 65 Unit(s) SubCutaneous at bedtime  insulin lispro (HumaLOG) corrective regimen sliding scale   SubCutaneous Before meals and at bedtime  insulin lispro Injectable (HumaLOG) 24 Unit(s) SubCutaneous three times a day with meals  levothyroxine 50 MICROGram(s) Oral daily  metoprolol 25 milliGRAM(s) Oral two times a day  montelukast 10 milliGRAM(s) Oral at bedtime  senna 2 Tablet(s) Oral at bedtime  sodium bicarbonate  Infusion 0.23 mEq/kG/Hr (75 mL/Hr) IV Continuous <Continuous>  sodium bicarbonate  Infusion 0.612 mEq/kG/Hr (200 mL/Hr) IV Continuous <Continuous>  sodium bicarbonate  Infusion 0.23 mEq/kG/Hr (75 mL/Hr) IV Continuous <Continuous>  sodium chloride 0.9% lock flush 3 milliLiter(s) IV Push every 8 hours  sodium chloride 0.9%. 500 milliLiter(s) (100 mL/Hr) IV Continuous <Continuous>      LABS:  10-19    140  |  104  |  33<H>  ----------------------------<  157<H>  4.2   |  19<L>  |  1.34<H>    Ca    9.1      19 Oct 2017 06:32  Phos  2.8     10-18  Mg     1.9     10-18    TPro  7.2  /  Alb  3.6  /  TBili  0.6  /  DBili  0.1  /  AST  43<H>  /  ALT  30  /  AlkPhos  72  10-19    Creatinine Trend: 1.34 <--, 1.63 <--, 1.76 <--, 1.81 <--, 1.71 <--, 1.49 <--, 1.53 <--    Albumin, Serum: 3.6 g/dL (10-19 @ 06:32)                              9.9    12.59 )-----------( 315      ( 19 Oct 2017 06:32 )             30.4     Urine Studies:  Urinalysis - [10-18-17 @ 11:15]      Color PLYEL / Appearance CLEAR / SG 1.014 / pH 6.5      Gluc 500 / Ketone NEGATIVE  / Bili NEGATIVE / Urobili NORMAL       Blood NEGATIVE / Protein 100 / Leuk Est NEGATIVE / Nitrite NEGATIVE      RBC 0-2 / WBC 0-2 / Hyaline  / Gran  / Sq Epi OCC / Non Sq Epi  / Bacteria       HbA1c 10.4      [10-13-17 @ 08:45]        RADIOLOGY & ADDITIONAL STUDIES:

## 2017-10-19 NOTE — PROGRESS NOTE ADULT - SUBJECTIVE AND OBJECTIVE BOX
Subjective: Denies chest pain / shortness of breath     MEDICATIONS  (STANDING):  aspirin enteric coated 81 milliGRAM(s) Oral daily  atorvastatin 40 milliGRAM(s) Oral at bedtime  docusate sodium 100 milliGRAM(s) Oral daily  famotidine    Tablet 20 milliGRAM(s) Oral daily  influenza   Vaccine 0.5 milliLiter(s) IntraMuscular once  insulin glargine Injectable (LANTUS) 65 Unit(s) SubCutaneous at bedtime  insulin lispro (HumaLOG) corrective regimen sliding scale   SubCutaneous Before meals and at bedtime  insulin lispro Injectable (HumaLOG) 24 Unit(s) SubCutaneous three times a day with meals  levothyroxine 50 MICROGram(s) Oral daily  metoprolol 25 milliGRAM(s) Oral two times a day  montelukast 10 milliGRAM(s) Oral at bedtime  senna 2 Tablet(s) Oral at bedtime      LABS:                        9.9    12.59 )-----------( 315      ( 19 Oct 2017 06:32 )             30.4     140  |  104  |  33<H>  ----------------------------<  157<H>  4.2   |  19<L>  |  1.34<H>    Ca    9.1      19 Oct 2017 06:32  Phos  2.8     10-18  Mg     1.9     10-18    TPro  7.2  /  Alb  3.6  /  TBili  0.6  /  DBili  0.1  /  AST  43<H>  /  ALT  30  /  AlkPhos  72  10-19  Creatinine Trend: 1.34<--, 1.63<--, 1.76<--, 1.81<--, 1.71<--, 1.49<--    PHYSICAL EXAM  Vital Signs Last 24 Hrs  T(C): 36.9 (19 Oct 2017 05:55), Max: 37 (18 Oct 2017 14:27)  T(F): 98.4 (19 Oct 2017 05:55), Max: 98.6 (18 Oct 2017 14:27)  HR: 74 (19 Oct 2017 05:55) (74 - 95)  BP: 123/65 (19 Oct 2017 05:55) (121/66 - 145/82)  RR: 18 (19 Oct 2017 05:55) (18 - 18)  SpO2: 98% (19 Oct 2017 05:55) (97% - 98%)    Cardiovascular: Normal S1 S2,  RRR   No JVD, 1/6 YUSUF murmur, Peripheral pulses palpable 2+ bilaterally  Respiratory: Lungs clear to auscultation, normal effort 	  Gastrointestinal:  Soft, Non-tender, + BS	  Extremities no edema, cyanosis, clubbing B/L LE's R groin + hematoma  s/p compression  soft non tender no signs of active bleed     TELEMETRY: SR     DIAGNOSTIC TESTING:    Catheterization: LVEDP 24, ostial Ramus ; SVG to Ramus down, SVG to PDA down, LIMA to LAD patent;     < from: VA Duplex Arterial Lower Ext, Right. (10.16.17 @ 08:21) >  Summary/Impressions:  No pseudoaneurysm.  ------------------------------------------------------------------------  Confirmed on  10/16/2017 - 11:33 AM by Ajith Louise MD,    < end of copied text >    ASSESSMENT/PLAN: 71y Female with HTN, hyperlipidemia, DM-II, known CAD with 3V CABG (LIMA to LAD, SVG to OM, SVG to PDA) at [facility] 2014 who c/o chest pain with subsequent abnormal stress test.     --s/p CATH w/ LVEDP 24, ostial Ramus ; SVG to Ramus down, SVG to PDA down, LIMA to LAD patent;   ---RLE doppler 10/16 --NO pseudoaneurysm  --Leukocytosis and elevated inflammatory markers--ID work up in progress  --will hold on cardiac cath for now and DC home once ok with ID  --Will plan for elective PCI in a few weeks        Juliane Fitch PA-C  [facility]  [address], [address]   [place] [address]  office [phone]  pager [phone]

## 2017-10-19 NOTE — PROGRESS NOTE ADULT - ATTENDING COMMENTS
Patient seen and examined.  Agree with above.   -Cath held due to ID workup being performed  -UA negative, CXR clear  -PCI to RI tomorrow if ok with renal and ID    Corie Ga MD  [facility]  [address] [place] [address], [address]  [place] [address]  office: [phone]  pager: [phone]
Patient seen and examined.  Agree with above.   -Cath pending ID clearance  -Pt. with no chest pain currently and ramus lesion stable with PARUL 3 flow  -Will consider outpatient staged PCI given above medical and ID issues    Corie Ga MD  [facility]  [address] [place] [address], [address]  [place] [address]  office: [phone]  pager: [phone]
Patient seen and examined.  Agree with above.   -Cr still elevated above baseline  -Staged intervention when renally stable    Corie Ga MD  [facility]  [address] [facility], [address]  [place] [address]  office: [phone]  pager: [phone]
Patient seen and examined.  Agree with above.   -Staged PCI to ramus when cr stable and renally optimized    Corie Ga MD  [facility]  [address] [facility], [address]  [place] [address]  office: [phone]  pager: [phone]
Patient seen and examined. Agree with above. Eventual PCI when medically optimized from a renal perspective.
Patient seen and examined. Agree with above. Eventual PCI when medically optimized from a renal perspective. F/U groin U/S given small hematoma.

## 2017-10-19 NOTE — CONSULT NOTE ADULT - PROBLEM SELECTOR RECOMMENDATION 3
monitor vitals. resume home bp meds
On multiple medications, monitored and followed up by primary/cardiology team

## 2017-10-19 NOTE — CONSULT NOTE ADULT - ASSESSMENT
Assessment  DMT2: 71y Female with DM T2 with hyperglycemia on insulin, blood sugars running fluctuating high, no hypoglycemia,  eating meals,  non compliant with low carb diet.  Hypothyroidism: on synthroid 50 mcg po daily  HTN: Controlled, On med.  HLD:  On Tx.  CAD:On medications, monitored.

## 2017-10-19 NOTE — CONSULT NOTE ADULT - PROBLEM SELECTOR RECOMMENDATION 9
Will continue current insulin regimen for now. Will continue monitoring FS, log, will Follow up.  Patient counseled for compliance with consistent low carb diet.
baseline ~ 1.4-1.6, stable. continue to monitor. had angiogram today 10/13. likely will need PCI on monday if cr is stable. patient high risk for contrast nephropathy will need  mucomyst 1200 bid for 4 doses start 1 day prior to test, last dose after test, sodium bicarb 150meq/L in sterile water at 200cc/hr x 1 hr 1hr prior to test then 75cc/hr x 6 hr after.

## 2017-10-20 ENCOUNTER — TRANSCRIPTION ENCOUNTER (OUTPATIENT)
Age: 72
End: 2017-10-20

## 2017-10-20 VITALS — HEART RATE: 84 BPM | DIASTOLIC BLOOD PRESSURE: 60 MMHG | SYSTOLIC BLOOD PRESSURE: 112 MMHG

## 2017-10-20 LAB
BUN SERPL-MCNC: 39 MG/DL — HIGH (ref 7–23)
CALCIUM SERPL-MCNC: 9.8 MG/DL — SIGNIFICANT CHANGE UP (ref 8.4–10.5)
CHLORIDE SERPL-SCNC: 104 MMOL/L — SIGNIFICANT CHANGE UP (ref 98–107)
CO2 SERPL-SCNC: 21 MMOL/L — LOW (ref 22–31)
CREAT SERPL-MCNC: 1.7 MG/DL — HIGH (ref 0.5–1.3)
GLUCOSE SERPL-MCNC: 143 MG/DL — HIGH (ref 70–99)
HCT VFR BLD CALC: 33.3 % — LOW (ref 34.5–45)
HGB BLD-MCNC: 10.5 G/DL — LOW (ref 11.5–15.5)
MCHC RBC-ENTMCNC: 28.3 PG — SIGNIFICANT CHANGE UP (ref 27–34)
MCHC RBC-ENTMCNC: 31.5 % — LOW (ref 32–36)
MCV RBC AUTO: 89.8 FL — SIGNIFICANT CHANGE UP (ref 80–100)
NRBC # FLD: 0.02 — SIGNIFICANT CHANGE UP
PLATELET # BLD AUTO: 388 K/UL — SIGNIFICANT CHANGE UP (ref 150–400)
PMV BLD: 9 FL — SIGNIFICANT CHANGE UP (ref 7–13)
POTASSIUM SERPL-MCNC: 4 MMOL/L — SIGNIFICANT CHANGE UP (ref 3.5–5.3)
POTASSIUM SERPL-SCNC: 4 MMOL/L — SIGNIFICANT CHANGE UP (ref 3.5–5.3)
RBC # BLD: 3.71 M/UL — LOW (ref 3.8–5.2)
RBC # FLD: 14.4 % — SIGNIFICANT CHANGE UP (ref 10.3–14.5)
SODIUM SERPL-SCNC: 142 MMOL/L — SIGNIFICANT CHANGE UP (ref 135–145)
WBC # BLD: 13.54 K/UL — HIGH (ref 3.8–10.5)
WBC # FLD AUTO: 13.54 K/UL — HIGH (ref 3.8–10.5)

## 2017-10-20 PROCEDURE — 71260 CT THORAX DX C+: CPT | Mod: 26

## 2017-10-20 PROCEDURE — 74177 CT ABD & PELVIS W/CONTRAST: CPT | Mod: 26

## 2017-10-20 RX ORDER — SENNA PLUS 8.6 MG/1
2 TABLET ORAL
Qty: 0 | Refills: 0 | COMMUNITY
Start: 2017-10-20

## 2017-10-20 RX ORDER — DOCUSATE SODIUM 100 MG
1 CAPSULE ORAL
Qty: 0 | Refills: 0 | COMMUNITY
Start: 2017-10-20

## 2017-10-20 RX ORDER — FUROSEMIDE 40 MG
1 TABLET ORAL
Qty: 0 | Refills: 0 | COMMUNITY

## 2017-10-20 RX ADMIN — FAMOTIDINE 20 MILLIGRAM(S): 10 INJECTION INTRAVENOUS at 12:27

## 2017-10-20 RX ADMIN — Medication 1: at 09:58

## 2017-10-20 RX ADMIN — Medication 25 MILLIGRAM(S): at 05:25

## 2017-10-20 RX ADMIN — Medication 1200 MILLIGRAM(S): at 17:22

## 2017-10-20 RX ADMIN — Medication 3: at 14:10

## 2017-10-20 RX ADMIN — SODIUM CHLORIDE 3 MILLILITER(S): 9 INJECTION INTRAMUSCULAR; INTRAVENOUS; SUBCUTANEOUS at 05:07

## 2017-10-20 RX ADMIN — Medication 50 MICROGRAM(S): at 05:25

## 2017-10-20 RX ADMIN — Medication 75 MEQ/KG/HR: at 14:12

## 2017-10-20 RX ADMIN — Medication 100 MILLIGRAM(S): at 12:27

## 2017-10-20 RX ADMIN — Medication 24 UNIT(S): at 14:10

## 2017-10-20 RX ADMIN — Medication 200 MEQ/KG/HR: at 11:00

## 2017-10-20 RX ADMIN — Medication 25 MILLIGRAM(S): at 17:23

## 2017-10-20 RX ADMIN — Medication 24 UNIT(S): at 18:10

## 2017-10-20 RX ADMIN — SODIUM CHLORIDE 3 MILLILITER(S): 9 INJECTION INTRAMUSCULAR; INTRAVENOUS; SUBCUTANEOUS at 12:26

## 2017-10-20 RX ADMIN — Medication 81 MILLIGRAM(S): at 17:23

## 2017-10-20 RX ADMIN — Medication 1200 MILLIGRAM(S): at 05:25

## 2017-10-20 RX ADMIN — Medication 24 UNIT(S): at 09:58

## 2017-10-20 NOTE — PROGRESS NOTE ADULT - PROBLEM SELECTOR PROBLEM 1
Diabetes mellitus, type 2
Stage 3 chronic kidney disease

## 2017-10-20 NOTE — PROGRESS NOTE ADULT - PROBLEM SELECTOR PLAN 2
Continue synthroid 50mcg daily, TFTs pending. FU
f/u cardio

## 2017-10-20 NOTE — PROGRESS NOTE ADULT - SUBJECTIVE AND OBJECTIVE BOX
Infectious Diseases progress note:    Subjective:  Pt ambulating in hallway with daughter.  No new somatic complaints.  Afebrile.  Leukocytosis.  Awaiting CT scanning    ROS:  CONSTITUTIONAL:  No fever, chills, rigors  CARDIOVASCULAR:  No chest pain or palpitations  RESPIRATORY:   No SOB, cough, dyspnea on exertion.  No wheezing  GASTROINTESTINAL:  No abd pain, N/V, diarrhea/constipation  EXTREMITIES:  No swelling or joint pain  GENITOURINARY:  No burning on urination, increased frequency or urgency.  No flank pain  NEUROLOGIC:  No HA, visual disturbances  SKIN: No rashes    Allergies    No Known Allergies    Intolerances        ANTIBIOTICS/RELEVANT:  antimicrobials    immunologic:  influenza   Vaccine 0.5 milliLiter(s) IntraMuscular once    OTHER:  acetaminophen   Tablet. 650 milliGRAM(s) Oral every 6 hours PRN  acetylcysteine  Oral Solution 1200 milliGRAM(s) Oral every 12 hours  aspirin enteric coated 81 milliGRAM(s) Oral daily  atorvastatin 40 milliGRAM(s) Oral at bedtime  dextrose 5%. 1000 milliLiter(s) IV Continuous <Continuous>  dextrose 50% Injectable 12.5 Gram(s) IV Push once  dextrose 50% Injectable 25 Gram(s) IV Push once  dextrose 50% Injectable 25 Gram(s) IV Push once  dextrose Gel 1 Dose(s) Oral once PRN  docusate sodium 100 milliGRAM(s) Oral daily  famotidine    Tablet 20 milliGRAM(s) Oral daily  glucagon  Injectable 1 milliGRAM(s) IntraMuscular once PRN  insulin glargine Injectable (LANTUS) 65 Unit(s) SubCutaneous at bedtime  insulin lispro (HumaLOG) corrective regimen sliding scale   SubCutaneous Before meals and at bedtime  insulin lispro Injectable (HumaLOG) 24 Unit(s) SubCutaneous three times a day with meals  levothyroxine 50 MICROGram(s) Oral daily  metoprolol 25 milliGRAM(s) Oral two times a day  montelukast 10 milliGRAM(s) Oral at bedtime  senna 2 Tablet(s) Oral at bedtime  sodium bicarbonate  Infusion 0.23 mEq/kG/Hr IV Continuous <Continuous>  sodium bicarbonate  Infusion 0.23 mEq/kG/Hr IV Continuous <Continuous>  sodium chloride 0.9% lock flush 3 milliLiter(s) IV Push every 8 hours  sodium chloride 0.9%. 500 milliLiter(s) IV Continuous <Continuous>      Objective:  Vital Signs Last 24 Hrs  T(C): 36.2 (20 Oct 2017 05:24), Max: 37.3 (19 Oct 2017 22:21)  T(F): 97.2 (20 Oct 2017 05:24), Max: 99.1 (19 Oct 2017 22:21)  HR: 79 (20 Oct 2017 05:24) (77 - 97)  BP: 117/60 (20 Oct 2017 05:24) (117/60 - 129/69)  BP(mean): --  RR: 18 (20 Oct 2017 05:24) (18 - 18)  SpO2: 98% (20 Oct 2017 05:24) (98% - 98%)    PHYSICAL EXAM:  Constitutional:NAD  Eyes:MOHSEN, EOMI  Ear/Nose/Throat: no oral lesion, no sinus tenderness on percussion	  Neck:no JVD, no lymphadenopathy, supple  Respiratory: CTA maribel  Cardiovascular: S1S2 RRR, no murmurs  Gastrointestinal:soft, (+) BS, no HSM  Extremities:no e/e/c        LABS:                        10.5   13.54 )-----------( 388      ( 20 Oct 2017 05:35 )             33.3     10-20    142  |  104  |  39<H>  ----------------------------<  143<H>  4.0   |  21<L>  |  1.70<H>    Ca    9.8      20 Oct 2017 05:35    TPro  7.2  /  Alb  3.6  /  TBili  0.6  /  DBili  0.1  /  AST  43<H>  /  ALT  30  /  AlkPhos  72  10-19        MICROBIOLOGY:        RADIOLOGY & ADDITIONAL STUDIES:

## 2017-10-20 NOTE — DISCHARGE NOTE ADULT - MEDICATION SUMMARY - MEDICATIONS TO STOP TAKING
I will STOP taking the medications listed below when I get home from the hospital:    Lasix 20 mg oral tablet  -- 1 tab(s) by mouth once a day

## 2017-10-20 NOTE — DISCHARGE NOTE ADULT - PLAN OF CARE
Follow up further angiogram and intervention Please follow up with Dr. Quick for staged intervention as an outpatient. His information is below please call for appointment. No infectious cause of your white blood cell count was found. No need for antibiotics. Follow up with your primary doctor to monitor your blood counts. Continue medications as currently prescribed. Follow up with your PMD for further management of disease. Dash diet. Continue current medications and low fat diet. Continue diet modification. Avoid complex carbohydrates such as bread, pasta, cereal, white rice, white potatoes, etc. Avoid concentrated sugar as found in desserts, candy, soda, juice, etc. Consume a diet based on lean protein (chicken, fish) and vegetables. Follow up with your kidney doctors to monitor your kidney function.

## 2017-10-20 NOTE — DISCHARGE NOTE ADULT - CARE PROVIDER_API CALL
Roberta Quick), Cardiovascular Disease; Interventional Cardiology  [address]  [place] [address]  Phone: [phone]  Fax: [phone]    Joselin Roman), Infectious Disease; Internal Medicine  [address] [place] [address]  Phone: [phone]  Fax: [phone] Roberta Quick), Cardiovascular Disease; Interventional Cardiology  [address]  [place] [address]  Phone: [phone]  Fax: [phone]    Joselin Roman), Infectious Disease; Internal Medicine  [address] [place] [address]  Phone: [phone]  Fax: [phone]    Bryant Muhammad (PJ), Internal Medicine; Nephrology  [address] [place] [address]  Phone: [phone]  Fax: [phone]

## 2017-10-20 NOTE — PROGRESS NOTE ADULT - ASSESSMENT
71 year old Persian speaking female with HTN, hyperlipidemia, DM-II, known CAD with 3V CABG (LIMA to LAD, SVG to OM, SVG to PDA) at [place] [address] who c/o chest pain with subsequent abnormal stress test.   In light of patients CAD history, symptoms and abnormal noninvasive test findings there is high suspicion for CAD progression. Patient is now referred to [facility] for a cardiac catheterization with possible PTCA/stent.    1 CAD  - Telemonitor   - sp cardiac cath  - cw current meds  - cards fu    2 DM  - monitor FS  - sliding scale coverage    3 HTN  - cw home meds
71 year old Romanian speaking female with HTN, hyperlipidemia, DM-II, known CAD with 3V CABG (LIMA to LAD, SVG to OM, SVG to PDA) at [facility] 2014 who c/o chest pain with subsequent abnormal stress test.   Plan for sequential cardiac catheterization.  Found to have mildly elevated WBC.  Pt recently treated for what sounds like shingles, about one month ago.  Currently c/o mild dysuria and occasional cough, and burning sensation in chest.  No fevers.    * Leukocytosis    Recommend:    - WBC slightly elevated today with high inflammatory markers.  No focal signs/symptoms.    - Recommend CT C/A/P to evaluate for underlying inflammatory focus.    - No need for antibiotics at this time    D/W pt's daughters at bedside.    Joselin Roman  [phone]
71 year old Armenian speaking female with HTN, hyperlipidemia, DM-II, known CAD with 3V CABG (LIMA to LAD, SVG to OM, SVG to PDA) at [facility] 2014 who c/o chest pain with subsequent abnormal stress test. s/p diagnostic angiogram today. likely will need PCI on monday
71 year old Belarusian speaking female with HTN, hyperlipidemia, DM-II, known CAD with 3V CABG (LIMA to LAD, SVG to OM, SVG to PDA) at [facility] 2014 who c/o chest pain with subsequent abnormal stress test.   Plan for sequential cardiac catheterization.  Found to have mildly elevated WBC.  Pt recently treated for what sounds like shingles, about one month ago.  Currently c/o mild dysuria and occasional cough, and burning sensation in chest.  No fevers.    * Leukocytosis    Recommend:    - WBC with continued elevation and high inflammatory markers.  No focal signs/symptoms.    - Recommend CT C/A/P to evaluate for underlying inflammatory focus.    - No need for antibiotics at this time        Joselin Roman  [phone]
71 year old Estonian speaking female with HTN, hyperlipidemia, DM-II, known CAD with 3V CABG (LIMA to LAD, SVG to OM, SVG to PDA) at [facility] 2014 who c/o chest pain with subsequent abnormal stress test.   In light of patients CAD history, symptoms and abnormal noninvasive test findings there is high suspicion for CAD progression. Patient is now referred to [facility] for a cardiac catheterization with possible PTCA/stent.    1 CAD  - Telemonitor   - staged PCI as per cards  - cw current meds  - cards fu    2 DM  - monitor FS  - sliding scale coverage    3 HTN  - cw home meds    4 CKD  - Management as per renal
71 year old Frisian speaking female with HTN, hyperlipidemia, DM-II, known CAD with 3V CABG (LIMA to LAD, SVG to OM, SVG to PDA) at [facility] 2014 who c/o chest pain with subsequent abnormal stress test. s/p diagnostic angiogram today. likely will need PCI on monday
71 year old Georgian speaking female with HTN, hyperlipidemia, DM-II, known CAD with 3V CABG (LIMA to LAD, SVG to OM, SVG to PDA) at [place] [address] who c/o chest pain with subsequent abnormal stress test.   In light of patients CAD history, symptoms and abnormal noninvasive test findings there is high suspicion for CAD progression. Patient is now referred to [facility] for a cardiac catheterization with possible PTCA/stent.    1 CAD  - Telemonitor   - sp cardiac cath  - cw current meds  - cards fu    2 DM  - monitor FS  - sliding scale coverage    3 HTN  - cw home meds
71 year old Indonesian speaking female with HTN, hyperlipidemia, DM-II, known CAD with 3V CABG (LIMA to LAD, SVG to OM, SVG to PDA) at [facility] 2014 who c/o chest pain with subsequent abnormal stress test. s/p diagnostic angiogram today. likely will need PCI on monday
71 year old Indonesian speaking female with HTN, hyperlipidemia, DM-II, known CAD with 3V CABG (LIMA to LAD, SVG to OM, SVG to PDA) at [facility] 2014 who c/o chest pain with subsequent abnormal stress test.   In light of patients CAD history, symptoms and abnormal noninvasive test findings there is high suspicion for CAD progression. Patient is now referred to [facility] for a cardiac catheterization with possible PTCA/stent.    1 CAD  - Telemonitor   - staged PCI as per cards  - cw current meds  - cards fu    2 DM  - monitor FS  - sliding scale coverage    3 HTN  - cw home meds    4 CKD  - Management as per renal
71 year old Japanese speaking female with HTN, hyperlipidemia, DM-II, known CAD with 3V CABG (LIMA to LAD, SVG to OM, SVG to PDA) at [facility] 2014 who c/o chest pain with subsequent abnormal stress test.   In light of patients CAD history, symptoms and abnormal noninvasive test findings there is high suspicion for CAD progression. Patient is now referred to [facility] for a cardiac catheterization with possible PTCA/stent.    1 CAD  - Telemonitor   - staged PCI as per cards  - cw current meds  - cards fu    2 DM  - monitor FS  - sliding scale coverage    3 HTN  - cw home meds    4 CKD  - Management as per renal
71 year old Kinyarwanda speaking female with HTN, hyperlipidemia, DM-II, known CAD with 3V CABG (LIMA to LAD, SVG to OM, SVG to PDA) at [facility] 2014 who c/o chest pain with subsequent abnormal stress test. s/p diagnostic angiogram today. likely will need PCI on monday
71 year old Maori speaking female with HTN, hyperlipidemia, DM-II, known CAD with 3V CABG (LIMA to LAD, SVG to OM, SVG to PDA) at [facility] 2014 who c/o chest pain with subsequent abnormal stress test. s/p diagnostic angiogram today. likely will need PCI on monday
71 year old Setswana speaking female with HTN, hyperlipidemia, DM-II, known CAD with 3V CABG (LIMA to LAD, SVG to OM, SVG to PDA) at [place] [address] who c/o chest pain with subsequent abnormal stress test.   In light of patients CAD history, symptoms and abnormal noninvasive test findings there is high suspicion for CAD progression. Patient is now referred to [facility] for a cardiac catheterization with possible PTCA/stent.    1 CAD  - Telemonitor   - sp cardiac cath  - cw current meds  - cards fu    2 DM  - monitor FS  - sliding scale coverage    3 HTN  - cw home meds
71 year old Yoruba speaking female with HTN, hyperlipidemia, DM-II, known CAD with 3V CABG (LIMA to LAD, SVG to OM, SVG to PDA) at [facility] 2014 who c/o chest pain with subsequent abnormal stress test. s/p diagnostic angiogram today. likely will need PCI on monday
11 year old Sinhala speaking female with HTN, hyperlipidemia, DM-II, known CAD with 3V CABG (LIMA to LAD, SVG to OM, SVG to PDA) at [facility] 2014 who c/o chest pain with subsequent abnormal stress test.   In light of patients CAD history, symptoms and abnormal noninvasive test findings there is high suspicion for CAD progression. Patient is now referred to [facility] for a cardiac catheterization with possible PTCA/stent.    1 CAD  - Telemonitor   - staged PCI as per cards  - cw current meds  - cards fu    2 DM  - monitor FS  - sliding scale coverage    3 HTN  - cw home meds    4 CKD  - Management as per renal
Assessment  DMT2: 71y Female with DM T2 with hyperglycemia on insulin, blood sugars running fluctuating high, no hypoglycemia,  eating meals,  non compliant with low carb diet.  Hypothyroidism: on synthroid 50 mcg po daily  HTN: Controlled, On med.  HLD:  On Tx.  CAD:On medications, monitored.
71 year old Lithuanian speaking female with HTN, hyperlipidemia, DM-II, known CAD with 3V CABG (LIMA to LAD, SVG to OM, SVG to PDA) at [facility] 2014 who c/o chest pain with subsequent abnormal stress test. s/p diagnostic angiogram today. likely will need PCI on monday

## 2017-10-20 NOTE — DISCHARGE NOTE ADULT - PATIENT PORTAL LINK FT
“You can access the [employer] Patient Portal, offered by [facility], by registering with the following website: [email or web address]”

## 2017-10-20 NOTE — PROGRESS NOTE ADULT - SUBJECTIVE AND OBJECTIVE BOX
Chief complaint  Patient is a 71y old  Female who presents with a chief complaint of  Review of systems  Patient in bed, comfortable, no fever,  no hypoglycemia.    Labs and Fingersticks    CAPILLARY BLOOD GLUCOSE  253 (20 Oct 2017 12:15)  157 (20 Oct 2017 08:59)  254 (18 Oct 2017 12:25)  216 (18 Oct 2017 08:59)        Calcium, Total Serum: 9.8 (10-20 @ 05:35)  Calcium, Total Serum: 9.1 (10-19 @ 06:32)  Albumin, Serum: 3.6 (10-19 @ 06:32)    Alanine Aminotransferase (ALT/SGPT): 30 (10-19 @ 06:32)  Alkaline Phosphatase, Serum: 72 (10-19 @ 06:32)  Aspartate Aminotransferase (AST/SGOT): 43 <H> (10-19 @ 06:32)        10-20    142  |  104  |  39<H>  ----------------------------<  143<H>  4.0   |  21<L>  |  1.70<H>    Ca    9.8      20 Oct 2017 05:35    TPro  7.2  /  Alb  3.6  /  TBili  0.6  /  DBili  0.1  /  AST  43<H>  /  ALT  30  /  AlkPhos  72  10-19                        10.5   13.54 )-----------( 388      ( 20 Oct 2017 05:35 )             33.3     Medications  MEDICATIONS  (STANDING):  acetylcysteine  Oral Solution 1200 milliGRAM(s) Oral every 12 hours  aspirin enteric coated 81 milliGRAM(s) Oral daily  atorvastatin 40 milliGRAM(s) Oral at bedtime  dextrose 5%. 1000 milliLiter(s) (50 mL/Hr) IV Continuous <Continuous>  dextrose 50% Injectable 12.5 Gram(s) IV Push once  dextrose 50% Injectable 25 Gram(s) IV Push once  dextrose 50% Injectable 25 Gram(s) IV Push once  docusate sodium 100 milliGRAM(s) Oral daily  famotidine    Tablet 20 milliGRAM(s) Oral daily  influenza   Vaccine 0.5 milliLiter(s) IntraMuscular once  insulin glargine Injectable (LANTUS) 65 Unit(s) SubCutaneous at bedtime  insulin lispro (HumaLOG) corrective regimen sliding scale   SubCutaneous Before meals and at bedtime  insulin lispro Injectable (HumaLOG) 24 Unit(s) SubCutaneous three times a day with meals  levothyroxine 50 MICROGram(s) Oral daily  metoprolol 25 milliGRAM(s) Oral two times a day  montelukast 10 milliGRAM(s) Oral at bedtime  senna 2 Tablet(s) Oral at bedtime  sodium bicarbonate  Infusion 0.23 mEq/kG/Hr (75 mL/Hr) IV Continuous <Continuous>  sodium bicarbonate  Infusion 0.23 mEq/kG/Hr (75 mL/Hr) IV Continuous <Continuous>  sodium chloride 0.9% lock flush 3 milliLiter(s) IV Push every 8 hours  sodium chloride 0.9%. 500 milliLiter(s) (100 mL/Hr) IV Continuous <Continuous>      Physical Exam  General: Patient comfortable in bed  Vital Signs Last 12 Hrs  T(F): 97.6 (10-20-17 @ 15:49), Max: 97.6 (10-20-17 @ 15:49)  HR: 82 (10-20-17 @ 15:49) (79 - 82)  BP: 117/47 (10-20-17 @ 15:49) (117/47 - 117/60)  BP(mean): --  RR: 18 (10-20-17 @ 15:49) (18 - 18)  SpO2: 99% (10-20-17 @ 15:49) (98% - 99%)  Neck: No palpable thyroid nodules.  CVS: S1S2, No murmurs  Respiratory: No wheezing, no crepitations  GI: Abdomen soft, bowel sounds positive  Musculoskeletal: Positive edema lower extremities bilaterally  Skin: No skin rashes, no ecchymosis    Diagnostics

## 2017-10-20 NOTE — PROGRESS NOTE ADULT - SUBJECTIVE AND OBJECTIVE BOX
NAD    INTERVAL HPI/OVERNIGHT EVENTS:  T(C): 36.4 (10-20-17 @ 15:49), Max: 37.3 (10-19-17 @ 22:21)  HR: 82 (10-20-17 @ 15:49) (77 - 97)  BP: 117/47 (10-20-17 @ 15:49) (117/47 - 129/69)  RR: 18 (10-20-17 @ 15:49) (18 - 18)  SpO2: 99% (10-20-17 @ 15:49) (98% - 99%)  Wt(kg): --  I&O's Summary      LABS:                        10.5   13.54 )-----------( 388      ( 20 Oct 2017 05:35 )             33.3     10-20    142  |  104  |  39<H>  ----------------------------<  143<H>  4.0   |  21<L>  |  1.70<H>    Ca    9.8      20 Oct 2017 05:35    TPro  7.2  /  Alb  3.6  /  TBili  0.6  /  DBili  0.1  /  AST  43<H>  /  ALT  30  /  AlkPhos  72  10-19        CAPILLARY BLOOD GLUCOSE  253 (20 Oct 2017 12:15)  157 (20 Oct 2017 08:59)      POCT Blood Glucose.: 253 mg/dL (20 Oct 2017 12:11)  POCT Blood Glucose.: 157 mg/dL (20 Oct 2017 08:55)  POCT Blood Glucose.: 224 mg/dL (19 Oct 2017 22:01)  POCT Blood Glucose.: 146 mg/dL (19 Oct 2017 17:21)            MEDICATIONS  (STANDING):  acetylcysteine  Oral Solution 1200 milliGRAM(s) Oral every 12 hours  aspirin enteric coated 81 milliGRAM(s) Oral daily  atorvastatin 40 milliGRAM(s) Oral at bedtime  dextrose 5%. 1000 milliLiter(s) (50 mL/Hr) IV Continuous <Continuous>  dextrose 50% Injectable 12.5 Gram(s) IV Push once  dextrose 50% Injectable 25 Gram(s) IV Push once  dextrose 50% Injectable 25 Gram(s) IV Push once  docusate sodium 100 milliGRAM(s) Oral daily  famotidine    Tablet 20 milliGRAM(s) Oral daily  influenza   Vaccine 0.5 milliLiter(s) IntraMuscular once  insulin glargine Injectable (LANTUS) 65 Unit(s) SubCutaneous at bedtime  insulin lispro (HumaLOG) corrective regimen sliding scale   SubCutaneous Before meals and at bedtime  insulin lispro Injectable (HumaLOG) 24 Unit(s) SubCutaneous three times a day with meals  levothyroxine 50 MICROGram(s) Oral daily  metoprolol 25 milliGRAM(s) Oral two times a day  montelukast 10 milliGRAM(s) Oral at bedtime  senna 2 Tablet(s) Oral at bedtime  sodium bicarbonate  Infusion 0.23 mEq/kG/Hr (75 mL/Hr) IV Continuous <Continuous>  sodium bicarbonate  Infusion 0.23 mEq/kG/Hr (75 mL/Hr) IV Continuous <Continuous>  sodium chloride 0.9% lock flush 3 milliLiter(s) IV Push every 8 hours  sodium chloride 0.9%. 500 milliLiter(s) (100 mL/Hr) IV Continuous <Continuous>    MEDICATIONS  (PRN):  acetaminophen   Tablet. 650 milliGRAM(s) Oral every 6 hours PRN mild, moderate, severe pain  dextrose Gel 1 Dose(s) Oral once PRN Blood Glucose LESS THAN 70 milliGRAM(s)/deciliter  glucagon  Injectable 1 milliGRAM(s) IntraMuscular once PRN Glucose LESS THAN 70 milligrams/deciliter          PHYSICAL EXAM:  GENERAL: NAD, well-groomed, well-developed  HEAD:  Atraumatic, Normocephalic  CHEST/LUNG: Clear to percussion bilaterally; No rales, rhonchi, wheezing, or rubs  HEART: Regular rate and rhythm; No murmurs, rubs, or gallops  ABDOMEN: Soft, Nontender, Nondistended; Bowel sounds present  EXTREMITIES:  2+ Peripheral Pulses, No clubbing, cyanosis, or edema  LYMPH: No lymphadenopathy noted  SKIN: No rashes or lesions    Care Discussed with Consultants/Other Providers [ *] YES  [ ] NO

## 2017-10-20 NOTE — DISCHARGE NOTE ADULT - PROVIDER TOKENS
TOKEN:'[id number]',TOKEN:'[id number]' TOKEN:'[id number]:'[id number]:'[id number]'

## 2017-10-20 NOTE — PROGRESS NOTE ADULT - SUBJECTIVE AND OBJECTIVE BOX
CARDIOLOGY ATTENDING    tele: no events    no palpitations, no syncope, no angina    acetaminophen   Tablet. 650 milliGRAM(s) Oral every 6 hours PRN  acetylcysteine  Oral Solution 1200 milliGRAM(s) Oral every 12 hours  aspirin enteric coated 81 milliGRAM(s) Oral daily  atorvastatin 40 milliGRAM(s) Oral at bedtime  dextrose 5%. 1000 milliLiter(s) IV Continuous <Continuous>  dextrose 50% Injectable 12.5 Gram(s) IV Push once  dextrose 50% Injectable 25 Gram(s) IV Push once  dextrose 50% Injectable 25 Gram(s) IV Push once  dextrose Gel 1 Dose(s) Oral once PRN  docusate sodium 100 milliGRAM(s) Oral daily  famotidine    Tablet 20 milliGRAM(s) Oral daily  glucagon  Injectable 1 milliGRAM(s) IntraMuscular once PRN  influenza   Vaccine 0.5 milliLiter(s) IntraMuscular once  insulin glargine Injectable (LANTUS) 65 Unit(s) SubCutaneous at bedtime  insulin lispro (HumaLOG) corrective regimen sliding scale   SubCutaneous Before meals and at bedtime  insulin lispro Injectable (HumaLOG) 24 Unit(s) SubCutaneous three times a day with meals  levothyroxine 50 MICROGram(s) Oral daily  metoprolol 25 milliGRAM(s) Oral two times a day  montelukast 10 milliGRAM(s) Oral at bedtime  senna 2 Tablet(s) Oral at bedtime  sodium bicarbonate  Infusion 0.23 mEq/kG/Hr IV Continuous <Continuous>  sodium bicarbonate  Infusion 0.23 mEq/kG/Hr IV Continuous <Continuous>  sodium chloride 0.9% lock flush 3 milliLiter(s) IV Push every 8 hours  sodium chloride 0.9%. 500 milliLiter(s) IV Continuous <Continuous>                            10.5   13.54 )-----------( 388      ( 20 Oct 2017 05:35 )             33.3       10-20    142  |  104  |  39<H>  ----------------------------<  143<H>  4.0   |  21<L>  |  1.70<H>    Ca    9.8      20 Oct 2017 05:35    TPro  7.2  /  Alb  3.6  /  TBili  0.6  /  DBili  0.1  /  AST  43<H>  /  ALT  30  /  AlkPhos  72  10-19      T(C): 36.2 (10-20-17 @ 05:24), Max: 37.3 (10-19-17 @ 22:21)  HR: 79 (10-20-17 @ 05:24) (75 - 97)  BP: 117/60 (10-20-17 @ 05:24) (117/60 - 138/72)  RR: 18 (10-20-17 @ 05:24) (18 - 18)  SpO2: 98% (10-20-17 @ 05:24) (98% - 100%)  Wt(kg): --    no JVD  RRR, no murmurs  CTAB  soft nt/nd  no c/c/e      ASSESSMENT/PLAN: 71y Female with HTN, hyperlipidemia, DM-II, known CAD with 3V CABG (LIMA to LAD, SVG to OM, SVG to PDA) at [facility] 2014 who c/o chest pain with subsequent abnormal stress test.     --s/p CATH w/ LVEDP 24, ostial Ramus; SVG to Ramus down, SVG to PDA down, LIMA to LAD patent;   ---RLE doppler 10/16 --NO pseudoaneurysm  --Leukocytosis and elevated inflammatory markers--ID work up in progress  --PCI once cleared by ID. This can be done as inpatient or outpatient

## 2017-10-20 NOTE — DISCHARGE NOTE ADULT - HOME CARE AGENCY
[facility] care agency at [phone], RN will call and make an appointment for initial assessment. RN and Home PT services, pending insurance review.

## 2017-10-20 NOTE — DISCHARGE NOTE ADULT - CARE PLAN
Principal Discharge DX:	CAD (coronary artery disease)  Goal:	Follow up further angiogram and intervention  Instructions for follow-up, activity and diet:	Please follow up with Dr. Quick for staged intervention as an outpatient. His information is below please call for appointment.  Secondary Diagnosis:	Leukocytosis, unspecified type  Instructions for follow-up, activity and diet:	No infectious cause of your white blood cell count was found. No need for antibiotics. Follow up with your primary doctor to monitor your blood counts.  Secondary Diagnosis:	Essential hypertension  Instructions for follow-up, activity and diet:	Continue medications as currently prescribed. Follow up with your PMD for further management of disease. Dash diet.  Secondary Diagnosis:	Hyperlipidemia  Instructions for follow-up, activity and diet:	Continue current medications and low fat diet.  Secondary Diagnosis:	Diabetes mellitus, type 2  Instructions for follow-up, activity and diet:	Continue diet modification. Avoid complex carbohydrates such as bread, pasta, cereal, white rice, white potatoes, etc. Avoid concentrated sugar as found in desserts, candy, soda, juice, etc. Consume a diet based on lean protein (chicken, fish) and vegetables.  Secondary Diagnosis:	CKD (chronic kidney disease)  Instructions for follow-up, activity and diet:	Follow up with your kidney doctors to monitor your kidney function.

## 2017-10-20 NOTE — PROGRESS NOTE ADULT - PROBLEM SELECTOR PLAN 3
mostly controlled
On multiple medications, monitored and followed up by primary/cardiology team
Optimal

## 2017-10-20 NOTE — PROGRESS NOTE ADULT - PROBLEM SELECTOR PLAN 1
Will continue current insulin regimen for now. Will continue monitoring FS, log, will Follow up.  Patient and family counseled for compliance with consistent low carb diet.  Will request diabetic teaching since patient will be discharged home on insulin.
baseline ~ 1.4-1.6, Cr slightly worse. on lasix. S/P angiogram 10/13. likely will need PCI on monday if cr is stable. patient high risk for contrast nephropathy will need  mucomyst 1200 bid for 4 doses start 1 day prior to test, last dose after test, sodium bicarb 150meq/L in sterile water at 200cc/hr x 1 hr 1hr prior to test then 75cc/hr x 6 hr after. Monitor BMP
baseline ~ 1.4-1.6, Cr slightly worse. on lasix. S/P angiogram 10/13. renal function slight worsen likely sec to uncontrolled DM. hold lasix. planning for cardiac cath for PCI of ramus this week. please give mucomyst 1200 bid for 4 doses start 1 day prior to test, last dose after test, sodium bicarb 150meq/L in sterile water at 200cc/hr x 1 hr 1hr prior to test then 75cc/hr x 6 hr after.
baseline ~ 1.4-1.6, Cr slightly worse. on lasix. S/P angiogram 10/13. renal function slight worsen likely sec to uncontrolled DM. planning for cardiac cath for PCI of ramus  mucomyst and bicarb as ordered. continue to monitor
baseline ~ 1.4-1.6, renal function at baseline, continue to hold lasix for now. cleared from renal perspective for angiogram. please give mucomyst 1200 bid for 4 doses start 1 day prior to test, last dose after test, sodium bicarb 150meq/L in sterile water at 200cc/hr x 1 hr 1hr prior to test then 75cc/hr x 6 hr after.
baseline ~ 1.4-1.6, renal function at baseline, continue to hold lasix for now. cleared from renal perspective for angiogram. please give mucomyst 1200 bid for 4 doses start 1 day prior to test, last dose after test, sodium bicarb 150meq/L in sterile water at 200cc/hr x 1 hr 1hr prior to test then 75cc/hr x 6 hr after.
baseline ~ 1.4-1.6, renal function slightly worsen likely sec to hyperglycemia. lasix is on hold. monitor bmp. planning for CT chest/abd/plevis, mucomyst and sodium bicarb as ordered.
baseline ~ 1.4-1.6, stable. continue to monitor. had angiogram today 10/13. likely will need PCI on monday if cr is stable. patient high risk for contrast nephropathy will need  mucomyst 1200 bid for 4 doses start 1 day prior to test, last dose after test, sodium bicarb 150meq/L in sterile water at 200cc/hr x 1 hr 1hr prior to test then 75cc/hr x 6 hr after.

## 2017-10-20 NOTE — PROGRESS NOTE ADULT - PROVIDER SPECIALTY LIST ADULT
Cardiology
Endocrinology
Hospitalist
Infectious Disease
Nephrology
Hospitalist
Nephrology
Infectious Disease

## 2017-10-20 NOTE — DISCHARGE NOTE ADULT - SECONDARY DIAGNOSIS.
Leukocytosis, unspecified type Essential hypertension Hyperlipidemia Diabetes mellitus, type 2 CKD (chronic kidney disease)

## 2017-10-20 NOTE — DISCHARGE NOTE ADULT - MEDICATION SUMMARY - MEDICATIONS TO TAKE
I will START or STAY ON the medications listed below when I get home from the hospital:    aspirin 81 mg oral delayed release tablet  -- 1 tab(s) by mouth once a day  -- Indication: For CAD (coronary artery disease)    Mapap 500 mg oral capsule  -- 1 tab(s) by mouth once a day  -- Indication: For Pain control    Apidra 100 units/mL subcutaneous solution  -- 24 unit(s) subcutaneous once a day (in the morning)  -- Indication: For Diabetes mellitus, type 2    Apidra 100 units/mL subcutaneous solution  -- 26 microcurie subcutaneous with lunch and dinner  -- Indication: For Diabetes mellitus, type 2    Toujeo SoloStar 300 units/mL subcutaneous solution  -- 65 unit(s) subcutaneous once a day (at bedtime)  -- Indication: For Diabetes mellitus, type 2    atorvastatin 40 mg oral tablet  -- 1 tab(s) by mouth once a day  -- Indication: For Hyperlipidemia    metoprolol tartrate 25 mg oral tablet  -- 1 tab(s) by mouth 2 times a day  -- Indication: For HTN (hypertension)    alendronate 70 mg oral tablet  -- 1 tab(s) by mouth once a week  -- Indication: For Osteoporosis    raNITIdine 150 mg oral capsule  -- 1 cap(s) by mouth 2 times a day  -- Indication: For GERD    senna oral tablet  -- 2 tab(s) by mouth once a day (at bedtime)  -- Indication: For Constipation    docusate sodium 100 mg oral capsule  -- 1 cap(s) by mouth once a day  -- Indication: For Constipation    montelukast 10 mg oral tablet  -- 1 tab(s) by mouth once a day (at bedtime)  -- Indication: For Shortness of breath    levothyroxine 50 mcg (0.05 mg) oral tablet  -- 1 tab(s) by mouth once a day  -- Indication: For Hypothyroidism    Vol-Care Rx oral tablet  -- 1 tab(s) by mouth once a day  -- Indication: For Supplement

## 2017-10-20 NOTE — DISCHARGE NOTE ADULT - HOSPITAL COURSE
71 year old Maltese speaking female with HTN, hyperlipidemia, DM-II, known CAD with 3V CABG (LIMA to LAD, SVG to OM, SVG to PDA) at [facility] 2014 who c/o chest pain with subsequent abnormal stress test.   In light of patients CAD history, symptoms and abnormal noninvasive test findings there is high suspicion for CAD progression. Patient is now referred to [facility] for a cardiac catheterization with possible PTCA/stent.     ortho10/13 CATH: LVEDP 24, ostial Ramus ; SVG to Ramus down, SVG to PDA down, LIMA to LAD patent; RFA access site     10/14: Renal: baseline ~ 1.4-1.6, stable. continue to monitor. had angiogram today 10/13. likely will need PCI on monday if cr is stable  10/14: Cardio: s/p CATH w/ LVEDP 24, ostial Ramus ; SVG to Ramus down, SVG to PDA down, LIMA to LAD patent;  US R groin pending   monitor H/H  plan for PCI to Ramus next week . medicine in pt appreciated  renal input appreciated  will need  mucomyst 1200 bid for 4 doses start 1 day prior to CATH, last dose after test, sodium bicarb 150meq/L in sterile water at 200cc/hr x 1 hr 1hr prior to CATH then 75cc/hr x 6 hr after.    10/15 Renal;  Stage 3 chronic kidney disease.  Plan: baseline ~ 1.4-1.6, Cr slightly worse. on lasix. S/P angiogram 10/13. likely will need PCI on monday if cr is stable. patient high risk for contrast nephropathy will need  mucomyst 1200 bid for 4 doses start 1 day prior to test, last dose after test, sodium bicarb 150meq/L in sterile water at 200cc/hr x 1 hr 1hr prior to test then 75cc/hr x 6 hr after. Monitor BMP.  10/15 Cardio Joey:  Eventual PCI when medically optimized from a renal perspective. F/U groin U/S given small hematoma.     10/16 Cardiology: plan for PCI to Ramus next week once optimized --hold on cath today given rising creatinine renal input appreciated  will need  mucomyst 1200 bid for 4 doses start 1 day prior to CATH, last dose after test, sodium bicarb 150meq/L in sterile water at 200cc/hr x 1 hr 1hr prior to CATH then 75cc/hr x 6 hr after.  10/16 RLE duplex: no pseudoaneurysm       10/17 Cardiol-- --s/p CATH w/ LVEDP 24, ostial Ramus ; SVG to Ramus down, SVG to PDA down, LIMA to LAD patent; ---RLE doppler 10/16 --No pseudoaneurysm, --plan for PCI to Ramus this week once optimized from renal perspective, --hold on cath today given elevated creatinine  10/17 Renal -- Cr slightly worse. on lasix. S/P angiogram 10/13. renal function slight worsen likely sec to uncontrolled DM. hold lasix. planning for cardiac cath for PCI of ramus this week. please give mucomyst 1200 bid for 4 doses start 1 day prior to test, last dose after test, sodium bicarb 150meq/L in sterile water at 200cc/hr x 1 hr 1hr prior to test then 75cc/hr x 6 hr after.     10/18 inc WBC CXR clear lungs UA neg   10/19 Cards: outpt Cath  10/19 ID: CT C/A/P to r/o inflammatory or infectious cause of WBC, no wbc  CT C/A/P 1.  No evidence of an intra-abdominal or intrathoracic source of   infection.  2.  A 2.2 cm left renal upper pole indeterminate lesion. Nonemergent   ultrasound  may performed for further characterization.  3.  Small right ovarian cyst. Suggest follow-up transvaginal ultrasound   in 6 months.   4.  Small hematoma right inguinal region consistent with recent       Plan for Cath as outpatient. Follow up with your nephrology. Monitor blood cell count with primary doctor. Cleared for discharge.

## 2017-10-20 NOTE — PROGRESS NOTE ADULT - PROBLEM SELECTOR PROBLEM 3
CAD (coronary artery disease)
HTN (hypertension)

## 2017-11-17 ENCOUNTER — INPATIENT (INPATIENT)
Facility: HOSPITAL | Age: 72
LOS: 2 days | Discharge: HOME | End: 2017-11-20
Attending: INTERNAL MEDICINE | Admitting: INTERNAL MEDICINE
Payer: MEDICAID

## 2017-11-17 ENCOUNTER — TRANSCRIPTION ENCOUNTER (OUTPATIENT)
Age: 72
End: 2017-11-17

## 2017-11-17 VITALS
DIASTOLIC BLOOD PRESSURE: 74 MMHG | SYSTOLIC BLOOD PRESSURE: 131 MMHG | TEMPERATURE: 98 F | OXYGEN SATURATION: 98 % | HEART RATE: 70 BPM | RESPIRATION RATE: 16 BRPM

## 2017-11-17 DIAGNOSIS — R07.9 CHEST PAIN, UNSPECIFIED: ICD-10-CM

## 2017-11-17 DIAGNOSIS — Z95.1 PRESENCE OF AORTOCORONARY BYPASS GRAFT: Chronic | ICD-10-CM

## 2017-11-17 LAB
BUN SERPL-MCNC: 28 MG/DL — HIGH (ref 7–23)
CALCIUM SERPL-MCNC: 9.3 MG/DL — SIGNIFICANT CHANGE UP (ref 8.4–10.5)
CHLORIDE SERPL-SCNC: 102 MMOL/L — SIGNIFICANT CHANGE UP (ref 98–107)
CO2 SERPL-SCNC: 22 MMOL/L — SIGNIFICANT CHANGE UP (ref 22–31)
CREAT SERPL-MCNC: 1.58 MG/DL — HIGH (ref 0.5–1.3)
GLUCOSE BLDC GLUCOMTR-MCNC: 152 MG/DL — HIGH (ref 70–99)
GLUCOSE BLDC GLUCOMTR-MCNC: 168 MG/DL — HIGH (ref 70–99)
GLUCOSE BLDC GLUCOMTR-MCNC: 215 MG/DL — HIGH (ref 70–99)
GLUCOSE SERPL-MCNC: 262 MG/DL — HIGH (ref 70–99)
HCT VFR BLD CALC: 34.9 % — SIGNIFICANT CHANGE UP (ref 34.5–45)
HGB BLD-MCNC: 11.4 G/DL — LOW (ref 11.5–15.5)
MCHC RBC-ENTMCNC: 29.3 PG — SIGNIFICANT CHANGE UP (ref 27–34)
MCHC RBC-ENTMCNC: 32.7 % — SIGNIFICANT CHANGE UP (ref 32–36)
MCV RBC AUTO: 89.7 FL — SIGNIFICANT CHANGE UP (ref 80–100)
NRBC # FLD: 0 — SIGNIFICANT CHANGE UP
PLATELET # BLD AUTO: 259 K/UL — SIGNIFICANT CHANGE UP (ref 150–400)
PMV BLD: 9 FL — SIGNIFICANT CHANGE UP (ref 7–13)
POTASSIUM SERPL-MCNC: 4.1 MMOL/L — SIGNIFICANT CHANGE UP (ref 3.5–5.3)
POTASSIUM SERPL-SCNC: 4.1 MMOL/L — SIGNIFICANT CHANGE UP (ref 3.5–5.3)
RBC # BLD: 3.89 M/UL — SIGNIFICANT CHANGE UP (ref 3.8–5.2)
RBC # FLD: 14.6 % — HIGH (ref 10.3–14.5)
SODIUM SERPL-SCNC: 139 MMOL/L — SIGNIFICANT CHANGE UP (ref 135–145)
WBC # BLD: 9.31 K/UL — SIGNIFICANT CHANGE UP (ref 3.8–10.5)
WBC # FLD AUTO: 9.31 K/UL — SIGNIFICANT CHANGE UP (ref 3.8–10.5)

## 2017-11-17 PROCEDURE — 93010 ELECTROCARDIOGRAM REPORT: CPT

## 2017-11-17 RX ORDER — LEVOTHYROXINE SODIUM 125 MCG
50 TABLET ORAL DAILY
Qty: 0 | Refills: 0 | Status: DISCONTINUED | OUTPATIENT
Start: 2017-11-17 | End: 2017-11-20

## 2017-11-17 RX ORDER — ASPIRIN/CALCIUM CARB/MAGNESIUM 324 MG
325 TABLET ORAL DAILY
Qty: 0 | Refills: 0 | Status: DISCONTINUED | OUTPATIENT
Start: 2017-11-18 | End: 2017-11-20

## 2017-11-17 RX ORDER — SODIUM CHLORIDE 9 MG/ML
500 INJECTION INTRAMUSCULAR; INTRAVENOUS; SUBCUTANEOUS
Qty: 0 | Refills: 0 | Status: DISCONTINUED | OUTPATIENT
Start: 2017-11-17 | End: 2017-11-20

## 2017-11-17 RX ORDER — FUROSEMIDE 40 MG
20 TABLET ORAL DAILY
Qty: 0 | Refills: 0 | Status: DISCONTINUED | OUTPATIENT
Start: 2017-11-17 | End: 2017-11-18

## 2017-11-17 RX ORDER — INSULIN LISPRO 100/ML
24 VIAL (ML) SUBCUTANEOUS
Qty: 0 | Refills: 0 | Status: DISCONTINUED | OUTPATIENT
Start: 2017-11-17 | End: 2017-11-20

## 2017-11-17 RX ORDER — FAMOTIDINE 10 MG/ML
20 INJECTION INTRAVENOUS DAILY
Qty: 0 | Refills: 0 | Status: DISCONTINUED | OUTPATIENT
Start: 2017-11-17 | End: 2017-11-20

## 2017-11-17 RX ORDER — INFLUENZA VIRUS VACCINE 15; 15; 15; 15 UG/.5ML; UG/.5ML; UG/.5ML; UG/.5ML
0.5 SUSPENSION INTRAMUSCULAR ONCE
Qty: 0 | Refills: 0 | Status: DISCONTINUED | OUTPATIENT
Start: 2017-11-17 | End: 2017-11-20

## 2017-11-17 RX ORDER — DEXTROSE 50 % IN WATER 50 %
25 SYRINGE (ML) INTRAVENOUS ONCE
Qty: 0 | Refills: 0 | Status: DISCONTINUED | OUTPATIENT
Start: 2017-11-17 | End: 2017-11-20

## 2017-11-17 RX ORDER — INSULIN LISPRO 100/ML
VIAL (ML) SUBCUTANEOUS
Qty: 0 | Refills: 0 | Status: DISCONTINUED | OUTPATIENT
Start: 2017-11-17 | End: 2017-11-20

## 2017-11-17 RX ORDER — INSULIN LISPRO 100/ML
26 VIAL (ML) SUBCUTANEOUS
Qty: 0 | Refills: 0 | Status: DISCONTINUED | OUTPATIENT
Start: 2017-11-17 | End: 2017-11-20

## 2017-11-17 RX ORDER — MONTELUKAST 4 MG/1
10 TABLET, CHEWABLE ORAL AT BEDTIME
Qty: 0 | Refills: 0 | Status: DISCONTINUED | OUTPATIENT
Start: 2017-11-17 | End: 2017-11-20

## 2017-11-17 RX ORDER — DEXTROSE 50 % IN WATER 50 %
12.5 SYRINGE (ML) INTRAVENOUS ONCE
Qty: 0 | Refills: 0 | Status: DISCONTINUED | OUTPATIENT
Start: 2017-11-17 | End: 2017-11-20

## 2017-11-17 RX ORDER — METOPROLOL TARTRATE 50 MG
25 TABLET ORAL
Qty: 0 | Refills: 0 | Status: DISCONTINUED | OUTPATIENT
Start: 2017-11-17 | End: 2017-11-20

## 2017-11-17 RX ORDER — CLOPIDOGREL BISULFATE 75 MG/1
75 TABLET, FILM COATED ORAL DAILY
Qty: 0 | Refills: 0 | Status: DISCONTINUED | OUTPATIENT
Start: 2017-11-18 | End: 2017-11-20

## 2017-11-17 RX ORDER — CLOPIDOGREL BISULFATE 75 MG/1
1 TABLET, FILM COATED ORAL
Qty: 0 | Refills: 0 | COMMUNITY
Start: 2017-11-17

## 2017-11-17 RX ORDER — INSULIN GLARGINE 100 [IU]/ML
65 INJECTION, SOLUTION SUBCUTANEOUS AT BEDTIME
Qty: 0 | Refills: 0 | Status: DISCONTINUED | OUTPATIENT
Start: 2017-11-17 | End: 2017-11-20

## 2017-11-17 RX ORDER — SODIUM CHLORIDE 9 MG/ML
3 INJECTION INTRAMUSCULAR; INTRAVENOUS; SUBCUTANEOUS EVERY 8 HOURS
Qty: 0 | Refills: 0 | Status: DISCONTINUED | OUTPATIENT
Start: 2017-11-17 | End: 2017-11-20

## 2017-11-17 RX ORDER — ATORVASTATIN CALCIUM 80 MG/1
40 TABLET, FILM COATED ORAL AT BEDTIME
Qty: 0 | Refills: 0 | Status: DISCONTINUED | OUTPATIENT
Start: 2017-11-17 | End: 2017-11-20

## 2017-11-17 RX ORDER — GLUCAGON INJECTION, SOLUTION 0.5 MG/.1ML
1 INJECTION, SOLUTION SUBCUTANEOUS ONCE
Qty: 0 | Refills: 0 | Status: DISCONTINUED | OUTPATIENT
Start: 2017-11-17 | End: 2017-11-20

## 2017-11-17 RX ORDER — DEXTROSE 50 % IN WATER 50 %
1 SYRINGE (ML) INTRAVENOUS ONCE
Qty: 0 | Refills: 0 | Status: DISCONTINUED | OUTPATIENT
Start: 2017-11-17 | End: 2017-11-20

## 2017-11-17 RX ORDER — SODIUM CHLORIDE 9 MG/ML
1000 INJECTION, SOLUTION INTRAVENOUS
Qty: 0 | Refills: 0 | Status: DISCONTINUED | OUTPATIENT
Start: 2017-11-17 | End: 2017-11-20

## 2017-11-17 RX ORDER — HEPARIN SODIUM 5000 [USP'U]/ML
5000 INJECTION INTRAVENOUS; SUBCUTANEOUS EVERY 12 HOURS
Qty: 0 | Refills: 0 | Status: DISCONTINUED | OUTPATIENT
Start: 2017-11-18 | End: 2017-11-20

## 2017-11-17 RX ADMIN — MONTELUKAST 10 MILLIGRAM(S): 4 TABLET, CHEWABLE ORAL at 22:26

## 2017-11-17 RX ADMIN — ATORVASTATIN CALCIUM 40 MILLIGRAM(S): 80 TABLET, FILM COATED ORAL at 22:26

## 2017-11-17 RX ADMIN — Medication 26 UNIT(S): at 18:10

## 2017-11-17 RX ADMIN — SODIUM CHLORIDE 3 MILLILITER(S): 9 INJECTION INTRAMUSCULAR; INTRAVENOUS; SUBCUTANEOUS at 16:48

## 2017-11-17 RX ADMIN — Medication 50 MICROGRAM(S): at 18:51

## 2017-11-17 RX ADMIN — Medication 20 MILLIGRAM(S): at 18:51

## 2017-11-17 RX ADMIN — Medication: at 18:10

## 2017-11-17 RX ADMIN — SODIUM CHLORIDE 75 MILLILITER(S): 9 INJECTION INTRAMUSCULAR; INTRAVENOUS; SUBCUTANEOUS at 13:38

## 2017-11-17 RX ADMIN — FAMOTIDINE 20 MILLIGRAM(S): 10 INJECTION INTRAVENOUS at 18:51

## 2017-11-17 RX ADMIN — Medication 25 MILLIGRAM(S): at 19:45

## 2017-11-17 RX ADMIN — INSULIN GLARGINE 65 UNIT(S): 100 INJECTION, SOLUTION SUBCUTANEOUS at 22:44

## 2017-11-17 RX ADMIN — SODIUM CHLORIDE 3 MILLILITER(S): 9 INJECTION INTRAMUSCULAR; INTRAVENOUS; SUBCUTANEOUS at 21:33

## 2017-11-17 NOTE — DISCHARGE NOTE ADULT - CARE PLAN
Principal Discharge DX:	CAD (coronary artery disease)  Goal:	To maintain patent coronary arteries  Instructions for follow-up, activity and diet:	Continue Plavix/ASA, Low-fat low cholesterol diet. Follow up with Cardiologist/PCP within 1-2 weeks.  Secondary Diagnosis:	Hyperlipidemia  Goal:	To maintain normal cholesterol levels.  Instructions for follow-up, activity and diet:	Continue with atorvastatin, low fat-low cholesterol diet. Follow up with Cardiologist/PCP within 1-2 weeks.  Secondary Diagnosis:	Diabetes mellitus, type 2  Goal:	To maintain normal blood glucose levels.  Secondary Diagnosis:	Hypothyroidism  Instructions for follow-up, activity and diet:	Continue with levothyroxine. Follow up with PCP/Endo.  Secondary Diagnosis:	GERD (gastroesophageal reflux disease)  Instructions for follow-up, activity and diet:	Continue with Ranitidine, anti-reflux diet. Follow up with PCP/GI. Principal Discharge DX:	CAD (coronary artery disease)  Goal:	To maintain blood flow through the Coronary Arteries.  Instructions for follow-up, activity and diet:	Continue medications as prescribed including aspirin, plavix, lopressor and lipitor.  Low salt, low fat, low sugar diet.  Follow up with cardiology within 1-2 weeks.  Secondary Diagnosis:	Hyperlipidemia  Goal:	To maintain normal cholesterol levels.  Instructions for follow-up, activity and diet:	Continue with atorvastatin, low fat diet. Follow up with Cardiologist/PCP within 1-2 weeks.  Secondary Diagnosis:	Diabetes mellitus, type 2  Goal:	To maintain normal blood glucose levels.  Instructions for follow-up, activity and diet:	Continue medications as prescribed.  Monitor your fingersticks pre-meals and pre-bedtime.  Low sugar diet.  Follow up with your PCP within 1-2 weeks.  You should have yearly routine eye and foot examinations.  Check your HgA1C blood test every 3 months.  Secondary Diagnosis:	Hypothyroidism  Instructions for follow-up, activity and diet:	Continue with levothyroxine. Follow up with PCP.  Secondary Diagnosis:	GERD (gastroesophageal reflux disease)  Instructions for follow-up, activity and diet:	Continue with Ranitidine, anti-reflux diet. Follow up with PCP/GI.  Secondary Diagnosis:	Stage 3 chronic kidney disease  Instructions for follow-up, activity and diet:	Continue medications as prescribed.  Follow up with your PCP for ongoing monitoring of your kidney function and electrolytes. Principal Discharge DX:	CAD (coronary artery disease)  Goal:	To maintain blood flow through the Coronary Arteries.  Instructions for follow-up, activity and diet:	Continue medications as prescribed including aspirin, plavix, lopressor and lipitor.  Low salt, low fat, low sugar diet.  Follow up with cardiology Dr. Quick on 11/21/17 at 12:30pm (appointment has been made for you).  Secondary Diagnosis:	Hyperlipidemia  Goal:	To maintain normal cholesterol levels.  Instructions for follow-up, activity and diet:	Continue with atorvastatin, low fat diet. Follow up with Cardiologist/PCP within 1-2 weeks.  Secondary Diagnosis:	Diabetes mellitus, type 2  Goal:	To maintain normal blood glucose levels.  Instructions for follow-up, activity and diet:	Continue medications as prescribed.  Monitor your fingersticks pre-meals and pre-bedtime.  Low sugar diet.  Follow up with your PCP within 1-2 weeks.  You should have yearly routine eye and foot examinations.  Check your HgA1C blood test every 3 months.  Secondary Diagnosis:	Hypothyroidism  Instructions for follow-up, activity and diet:	Continue with levothyroxine. Follow up with PCP.  Secondary Diagnosis:	GERD (gastroesophageal reflux disease)  Instructions for follow-up, activity and diet:	Continue with Ranitidine, anti-reflux diet. Follow up with PCP/GI.  Secondary Diagnosis:	Stage 3 chronic kidney disease  Instructions for follow-up, activity and diet:	Continue medications as prescribed.  Follow up with your PCP for ongoing monitoring of your kidney function and electrolytes. Principal Discharge DX:	CAD (coronary artery disease)  Goal:	To maintain blood flow through the Coronary Arteries.  Instructions for follow-up, activity and diet:	Continue medications as prescribed including aspirin, plavix, lasix, lopressor and lipitor.  Low salt, low fat, low sugar diet.  Follow up with cardiology Dr. Quick on 11/21/17 at 12:30pm (appointment has been made for you).  Secondary Diagnosis:	Hyperlipidemia  Goal:	To maintain normal cholesterol levels.  Instructions for follow-up, activity and diet:	Continue with atorvastatin, low fat diet. Follow up with Cardiologist/PCP within 1-2 weeks.  Secondary Diagnosis:	Diabetes mellitus, type 2  Goal:	To maintain normal blood glucose levels.  Instructions for follow-up, activity and diet:	Continue medications as prescribed.  Monitor your fingersticks pre-meals and pre-bedtime.  Low sugar diet.  Follow up with your PCP within 1-2 weeks.  You should have yearly routine eye and foot examinations.  Check your HgA1C blood test every 3 months.  Secondary Diagnosis:	Hypothyroidism  Instructions for follow-up, activity and diet:	Continue with levothyroxine. Follow up with PCP.  Secondary Diagnosis:	GERD (gastroesophageal reflux disease)  Instructions for follow-up, activity and diet:	Continue with Ranitidine, anti-reflux diet. Follow up with PCP/GI.  Secondary Diagnosis:	Stage 3 chronic kidney disease  Instructions for follow-up, activity and diet:	Continue medications as prescribed.  Follow up with nephrology Dr. Muhammad for ongoing monitoring of your kidney function and electrolytes within 1-2 weeks; call for appointment.

## 2017-11-17 NOTE — DISCHARGE NOTE ADULT - ADDITIONAL INSTRUCTIONS
Follow up with your PCP and cardiology within 1-2 weeks; call for appointments.    Monitor cardiac catheterization site for signs of bleeding, increased bruising, swelling, or discharge. If you experience any of these symptoms, please follow up with your primary care physician or return to the hospital immediately. Do not submerge the site in water (bathe or swim). You may shower. No strenuous activity for 3 weeks. Do not drive for 48hrs following angiogram. Follow up with your PCP within 1-2 weeks; call for appointment.  Follow up with cardiology Dr. Quick on 11/21/17 at 12:30pm (appointment has been made for you).     Monitor cardiac catheterization site for signs of bleeding, increased bruising, swelling, or discharge. If you experience any of these symptoms, please follow up with your primary care physician or return to the hospital immediately. Do not submerge the site in water (bathe or swim). You may shower. No strenuous activity for 3 weeks. Do not drive for 48hrs following angiogram. Follow up with your PCP within 1-2 weeks; call for appointment.  Follow up with cardiology Dr. Quick on 11/21/17 at 12:30pm (appointment has been made for you).   Follow up with nephrology Dr. Muhammad within 1-2 weeks; call for appointment.     Monitor cardiac catheterization site for signs of bleeding, increased bruising, swelling, or discharge. If you experience any of these symptoms, please follow up with your primary care physician or return to the hospital immediately. Do not submerge the site in water (bathe or swim). You may shower. No strenuous activity for 3 weeks. Do not drive for 48hrs following angiogram.

## 2017-11-17 NOTE — DISCHARGE NOTE ADULT - SECONDARY DIAGNOSIS.
Hyperlipidemia Diabetes mellitus, type 2 Hypothyroidism GERD (gastroesophageal reflux disease) Stage 3 chronic kidney disease

## 2017-11-17 NOTE — DISCHARGE NOTE ADULT - HOSPITAL COURSE
71 year old Kiswahili speaking female with HTN, CKD, Hyperlipidemia, DM-II, known CAD with 3V CABG (LIMA to LAD, SVG to OM, SVG to PDA) at [facility] 2014 with recent cath showing patent LIMA-LAD, closed SVG grafts, and a severe stenosis in the ostial Ramus with a plan to perform a staged PCI to RI when her Cr was stable and she was renally optimized and clear of any infection. Pt presents for staged PCI today for her RI lesion. Pt denies any new symptoms since last admission.     On 11/17/17, Patient underwent left heart catherization with intervention to RI 90% x2 CORNELIA. ASA 325mg and Plavix 75mg was started. 71 year old Romansh speaking female with HTN, CKD, Hyperlipidemia, DM-II, known CAD with 3V CABG (LIMA to LAD, SVG to OM, SVG to PDA) at [facility] 2014 with recent cath showing patent LIMA-LAD, closed SVG grafts, and a severe stenosis in the ostial Ramus with a plan to perform a staged PCI to RI when her Cr was stable and she was renally optimized and clear of any infection. Pt presents for staged PCI today for her RI lesion. Pt denies any new symptoms since last admission.     On 11/17/17, Patient underwent left heart cath with intervention to RI 90% x2 CORNELIA. ASA 325mg and Plavix 75mg was started.  RRA site checked and is stable.  Patient on asa, plavix, lopressor, lipitor.      Renal evaluated the patient: Stage 3 chronic kidney disease. baseline ~1.4-1.6, renal function slightly worsen today possible sec to contrast s/p cardiac cath done 11/17 vs prerenal sec to hyperglycemia on lasix. patient clinically looks euvolemic, would hold lasix for now. check urine cr, urea, eos, UA, renal US. monitor renal function.    Patient to have outpatient follow up with PCP and cardiology.       INCOMPLETE 71 year old Wolof speaking female with HTN, CKD, Hyperlipidemia, DM-II, known CAD with 3V CABG (LIMA to LAD, SVG to OM, SVG to PDA) at [facility] 2014 with recent cath showing patent LIMA-LAD, closed SVG grafts, and a severe stenosis in the ostial Ramus with a plan to perform a staged PCI to RI when her Cr was stable and she was renally optimized and clear of any infection. Pt presents for staged PCI today for her RI lesion. Pt denies any new symptoms since last admission.     On 11/17/17, Patient underwent left heart cath with intervention to RI 90% x2 CORNELIA. ASA 325mg and Plavix 75mg was started.  RRA site checked and is stable.  Patient on asa, plavix, lopressor, lipitor.      Renal evaluated the patient: Stage 3 chronic kidney disease. baseline ~1.4-1.6, renal function slightly worsen today possible sec to contrast s/p cardiac cath done 11/17 vs prerenal sec to hyperglycemia on lasix. patient clinically looks euvolemic, would hold lasix for now. check urine cr, urea, eos, UA, renal US. monitor renal function.    Patient to have outpatient follow up with PCP and cardiology.     Patient cleared for discharge home by cardiology. 71 year old Setswana speaking female with HTN, CKD, Hyperlipidemia, DM-II, known CAD with 3V CABG (LIMA to LAD, SVG to OM, SVG to PDA) at [facility] 2014 with recent cath showing patent LIMA-LAD, closed SVG grafts, and a severe stenosis in the ostial Ramus with a plan to perform a staged PCI to RI when her Cr was stable and she was renally optimized and clear of any infection. Pt presents for staged PCI today for her RI lesion. Pt denies any new symptoms since last admission.     On 11/17/17, Patient underwent left heart cath with intervention to RI 90% x2 CORNELIA. ASA 325mg and Plavix 75mg was started.  RRA site checked and is stable.  Patient on asa, plavix, lopressor, lipitor.      Renal evaluated the patient: Stage 3 chronic kidney disease. baseline ~1.4-1.6, renal function slightly worsen today possible sec to contrast s/p cardiac cath done 11/17 vs prerenal sec to hyperglycemia on lasix. patient clinically looks euvolemic, would hold lasix for now. check urine cr, urea, eos, UA, renal US. monitor renal function.  11/19 Renal US: Left upper pole renal cyst corresponding to the CT finding. No evidence of hydronephrosis.  SCr downtrended to 1.7 on 11/20.  Patient cleared for discharge by renal.  Lasix 20mg po qd resumed on discharge. Outpatient follow up with renal within 1-2 weeks.     Patient to have outpatient follow up with PCP, renal and cardiology.     Patient cleared for discharge home by cardiology. 71 year old Swedish speaking female with HTN, CKD, Hyperlipidemia, DM-II, known CAD with 3V CABG (LIMA to LAD, SVG to OM, SVG to PDA) at [facility] 2014 with recent cath showing patent LIMA-LAD, closed SVG grafts, and a severe stenosis in the ostial Ramus with a plan to perform a staged PCI to RI when her Cr was stable and she was renally optimized and clear of any infection. Pt presents for staged PCI today for her RI lesion. Pt denies any new symptoms since last admission.     On 11/17/17, Patient underwent left heart cath with intervention to RI 90% x2 CORNELIA. ASA 325mg and Plavix 75mg was started.  RRA site checked and is stable.  Patient on asa, plavix, lopressor, lipitor.      Renal evaluated the patient: Stage 3 chronic kidney disease. baseline ~1.4-1.6, renal function slightly worsen today possible sec to contrast s/p cardiac cath done 11/17 vs prerenal sec to hyperglycemia on lasix. patient clinically looks euvolemic, would hold lasix for now. check urine cr, urea, eos, UA, renal US. monitor renal function.  11/19 Renal US: Left upper pole renal cyst corresponding to the CT finding. No evidence of hydronephrosis.  SCr downtrended to 1.7 on 11/20.  Patient cleared for discharge by renal.  Lasix 20mg po qd resumed on discharge. Outpatient follow up with renal within 1-2 weeks.     Patient to have outpatient follow up with PCP, renal and cardiology.     Patient cleared for discharge home by cardiology and nephrology. 71 year old Romansh speaking female with HTN, CKD, Hyperlipidemia, DM-II, known CAD with 3V CABG (LIMA to LAD, SVG to OM, SVG to PDA) at [facility] 2014 with recent cath showing patent LIMA-LAD, closed SVG grafts, and a severe stenosis in the ostial Ramus with a plan to perform a staged PCI to RI when her Cr was stable and she was renally optimized and clear of any infection. Pt presents for staged PCI today for her RI lesion. Pt denies any new symptoms since last admission.     On 11/17/17, Patient underwent left heart cath with intervention to RI 90% x2 CORNELIA. ASA 325mg and Plavix 75mg was started.  RRA site checked and is stable.  Patient on asa, plavix, lopressor, lipitor.      Renal evaluated the patient: Stage 3 chronic kidney disease. baseline ~1.4-1.6, renal function slightly worsen today possible sec to contrast s/p cardiac cath done 11/17 vs prerenal sec to hyperglycemia on lasix. patient clinically looks euvolemic, would hold lasix for now. check urine cr, urea, eos, UA, renal US. monitor renal function.  11/19 Renal US: Left upper pole renal cyst corresponding to the CT finding. No evidence of hydronephrosis.  SCr downtrended to 1.7 on 11/20.  Patient cleared for discharge by renal.  Lasix 20mg po qd resumed on discharge. Outpatient follow up with renal within 1-2 weeks.     Addendum 11/27: Patient with MERVIN during hospitalization.  SCr increased to 1.89-->1.90.  Improved to 1.78 on day of discharge.  Baseline SCr as above 1.4-1.6.  Patient was cleared for discharge by renal with outpatient follow up and check of BMP as outpatient to insure continued downtrend of SCr back to baseline.      Patient to have outpatient follow up with PCP, renal and cardiology.     Patient cleared for discharge home by cardiology and nephrology.

## 2017-11-17 NOTE — DISCHARGE NOTE ADULT - INSTRUCTIONS
Continue diet modification. Avoid complex carbohydrates such as bread, pasta, cereal, white rice, white potatoes, etc. Avoid concentrated sugar as found in desserts, candy, soda, juice, etc. Consume a diet based on lean protein (chicken, fish) and vegetables. Low salt, low fat diet.

## 2017-11-17 NOTE — DISCHARGE NOTE ADULT - CARE PROVIDER_API CALL
Roberta Quick), Cardiovascular Disease; Interventional Cardiology  [address]  [place] [address]  Phone: [phone]  Fax: [phone] Roberta Quick), Cardiovascular Disease; Interventional Cardiology  [address]  [place] [address]  Phone: [phone]  Fax: [phone]    Bryant Muhammad (PJ), Internal Medicine; Nephrology  [address]  [address]nd [place] [address]  Phone: [phone]  Fax: [phone]

## 2017-11-17 NOTE — H&P CARDIOLOGY - HISTORY OF PRESENT ILLNESS
71 year old Persian speaking female with HTN, CKD, Hyperlipidemia, DM-II, known CAD with 3V CABG (LIMA to LAD, SVG to OM, SVG to PDA) at [facility] 2014 with recent cath showing patent LIMA-LAD, closed SVG grafts, and a severe stenosis in the ostial Ramus with a plan to perform a staged PCI to RI when her Cr was stable and she was renally optimized and clear of any infection. Pt presents for staged PCI today for her RI lesion. Pt denies any new symptoms since last admission.

## 2017-11-17 NOTE — DISCHARGE NOTE ADULT - PLAN OF CARE
To maintain patent coronary arteries Continue Plavix/ASA, Low-fat low cholesterol diet. Follow up with Cardiologist/PCP within 1-2 weeks. To maintain normal cholesterol levels. Continue with atorvastatin, low fat-low cholesterol diet. Follow up with Cardiologist/PCP within 1-2 weeks. To maintain normal blood glucose levels. Continue with levothyroxine. Follow up with PCP/Endo. Continue with Ranitidine, anti-reflux diet. Follow up with PCP/GI. To maintain blood flow through the Coronary Arteries. Continue medications as prescribed including aspirin, plavix, lopressor and lipitor.  Low salt, low fat, low sugar diet.  Follow up with cardiology within 1-2 weeks. Continue with atorvastatin, low fat diet. Follow up with Cardiologist/PCP within 1-2 weeks. Continue medications as prescribed.  Monitor your fingersticks pre-meals and pre-bedtime.  Low sugar diet.  Follow up with your PCP within 1-2 weeks.  You should have yearly routine eye and foot examinations.  Check your HgA1C blood test every 3 months. Continue with levothyroxine. Follow up with PCP. Continue medications as prescribed.  Follow up with your PCP for ongoing monitoring of your kidney function and electrolytes. Continue medications as prescribed including aspirin, plavix, lopressor and lipitor.  Low salt, low fat, low sugar diet.  Follow up with cardiology Dr. Quick on 11/21/17 at 12:30pm (appointment has been made for you). Continue medications as prescribed including aspirin, plavix, lasix, lopressor and lipitor.  Low salt, low fat, low sugar diet.  Follow up with cardiology Dr. Quick on 11/21/17 at 12:30pm (appointment has been made for you). Continue medications as prescribed.  Follow up with nephrology Dr. Muhammad for ongoing monitoring of your kidney function and electrolytes within 1-2 weeks; call for appointment.

## 2017-11-17 NOTE — DISCHARGE NOTE ADULT - PATIENT PORTAL LINK FT
“You can access the Follow[employer] Patient Portal, offered by [facility], by registering with the following website: [email or web address]”

## 2017-11-17 NOTE — DISCHARGE NOTE ADULT - MEDICATION SUMMARY - MEDICATIONS TO TAKE
I will START or STAY ON the medications listed below when I get home from the hospital:    aspirin 81 mg oral delayed release tablet  -- 1 tab(s) by mouth once a day  -- Indication: For CAD    Mapap 500 mg oral capsule  -- 1 tab(s) by mouth once a day  -- Indication: For Arthritis    Apidra 100 units/mL subcutaneous solution  -- 24 unit(s) subcutaneous once a day (in the morning)  -- Indication: For DM2    Apidra 100 units/mL subcutaneous solution  -- 26 microcurie subcutaneous with lunch and dinner  -- Indication: For DM2    Toujeo SoloStar 300 units/mL subcutaneous solution  -- 65 unit(s) subcutaneous once a day (at bedtime)  -- Indication: For DM2    atorvastatin 40 mg oral tablet  -- 1 tab(s) by mouth once a day  -- Indication: For HLD    clopidogrel 75 mg oral tablet  -- 1 tab(s) by mouth once a day  -- Indication: For CAD    metoprolol tartrate 25 mg oral tablet  -- 1 tab(s) by mouth 2 times a day  -- Indication: For HTN    alendronate 70 mg oral tablet  -- 1 tab(s) by mouth once a week  -- Indication: For OP    Lasix 20 mg oral tablet  -- 1 tab(s) by mouth once a day  -- Indication: For HTN    raNITIdine 150 mg oral capsule  -- 1 cap(s) by mouth 2 times a day  -- Indication: For GERD    montelukast 10 mg oral tablet  -- 1 tab(s) by mouth once a day (at bedtime)  -- Indication: For Allergies    levothyroxine 50 mcg (0.05 mg) oral tablet  -- 1 tab(s) by mouth once a day  -- Indication: For Hypothyroidism    Vol-Care Rx oral tablet  -- 1 tab(s) by mouth once a day  -- Indication: For Vitamin I will START or STAY ON the medications listed below when I get home from the hospital:    aspirin 81 mg oral delayed release tablet  -- 1 tab(s) by mouth once a day  -- Indication: For CAD (coronary artery disease)    Apidra 100 units/mL subcutaneous solution  -- 24 unit(s) subcutaneous once a day (in the morning)  -- Indication: For Diabetes mellitus, type 2    Apidra 100 units/mL subcutaneous solution  -- 26 microcurie subcutaneous with lunch and dinner  -- Indication: For Diabetes mellitus, type 2    Toujeo SoloStar 300 units/mL subcutaneous solution  -- 65 unit(s) subcutaneous once a day (at bedtime)  -- Indication: For Diabetes mellitus, type 2    atorvastatin 40 mg oral tablet  -- 1 tab(s) by mouth once a day  -- Indication: For High cholesterol    clopidogrel 75 mg oral tablet  -- 1 tab(s) by mouth once a day  -- Indication: For CAD (coronary artery disease)    metoprolol tartrate 25 mg oral tablet  -- 1 tab(s) by mouth 2 times a day  -- Indication: For CAD (coronary artery disease)    alendronate 70 mg oral tablet  -- 1 tab(s) by mouth once a week  -- Indication: For Osteoporosis     Lasix 20 mg oral tablet  -- 1 tab(s) by mouth once a day  -- Indication: For Diuretic    raNITIdine 150 mg oral capsule  -- 1 cap(s) by mouth 2 times a day  -- Indication: For GERD    montelukast 10 mg oral tablet  -- 1 tab(s) by mouth once a day (at bedtime)  -- Indication: For Allergies    levothyroxine 50 mcg (0.05 mg) oral tablet  -- 1 tab(s) by mouth once a day  -- Indication: For Hypothyroidism    Vol-Care Rx oral tablet  -- 1 tab(s) by mouth once a day  -- Indication: For Vitamin

## 2017-11-18 DIAGNOSIS — N18.3 CHRONIC KIDNEY DISEASE, STAGE 3 (MODERATE): ICD-10-CM

## 2017-11-18 DIAGNOSIS — I25.10 ATHEROSCLEROTIC HEART DISEASE OF NATIVE CORONARY ARTERY WITHOUT ANGINA PECTORIS: ICD-10-CM

## 2017-11-18 LAB
APPEARANCE UR: SIGNIFICANT CHANGE UP
BILIRUB UR-MCNC: NEGATIVE — SIGNIFICANT CHANGE UP
BLOOD UR QL VISUAL: NEGATIVE — SIGNIFICANT CHANGE UP
BUN SERPL-MCNC: 31 MG/DL — HIGH (ref 7–23)
CALCIUM SERPL-MCNC: 9 MG/DL — SIGNIFICANT CHANGE UP (ref 8.4–10.5)
CHLORIDE SERPL-SCNC: 101 MMOL/L — SIGNIFICANT CHANGE UP (ref 98–107)
CO2 SERPL-SCNC: 22 MMOL/L — SIGNIFICANT CHANGE UP (ref 22–31)
COLOR SPEC: YELLOW — SIGNIFICANT CHANGE UP
CREAT ?TM UR-MCNC: 118.86 MG/DL — SIGNIFICANT CHANGE UP
CREAT SERPL-MCNC: 1.77 MG/DL — HIGH (ref 0.5–1.3)
EOSINOPHIL NFR URNS MANUAL: NEGATIVE — SIGNIFICANT CHANGE UP (ref 0–0)
GLUCOSE BLDC GLUCOMTR-MCNC: 115 MG/DL — HIGH (ref 70–99)
GLUCOSE BLDC GLUCOMTR-MCNC: 192 MG/DL — HIGH (ref 70–99)
GLUCOSE BLDC GLUCOMTR-MCNC: 212 MG/DL — HIGH (ref 70–99)
GLUCOSE BLDC GLUCOMTR-MCNC: 258 MG/DL — HIGH (ref 70–99)
GLUCOSE SERPL-MCNC: 255 MG/DL — HIGH (ref 70–99)
GLUCOSE UR-MCNC: 50 — SIGNIFICANT CHANGE UP
HCT VFR BLD CALC: 33.9 % — LOW (ref 34.5–45)
HGB BLD-MCNC: 11.1 G/DL — LOW (ref 11.5–15.5)
HYALINE CASTS # UR AUTO: SIGNIFICANT CHANGE UP (ref 0–?)
KETONES UR-MCNC: NEGATIVE — SIGNIFICANT CHANGE UP
LEUKOCYTE ESTERASE UR-ACNC: HIGH
MAGNESIUM SERPL-MCNC: 1.9 MG/DL — SIGNIFICANT CHANGE UP (ref 1.6–2.6)
MCHC RBC-ENTMCNC: 28.8 PG — SIGNIFICANT CHANGE UP (ref 27–34)
MCHC RBC-ENTMCNC: 32.7 % — SIGNIFICANT CHANGE UP (ref 32–36)
MCV RBC AUTO: 88.1 FL — SIGNIFICANT CHANGE UP (ref 80–100)
MUCOUS THREADS # UR AUTO: SIGNIFICANT CHANGE UP
NITRITE UR-MCNC: NEGATIVE — SIGNIFICANT CHANGE UP
NRBC # FLD: 0 — SIGNIFICANT CHANGE UP
PH UR: 5.5 — SIGNIFICANT CHANGE UP (ref 4.6–8)
PLATELET # BLD AUTO: 263 K/UL — SIGNIFICANT CHANGE UP (ref 150–400)
PMV BLD: 9 FL — SIGNIFICANT CHANGE UP (ref 7–13)
POTASSIUM SERPL-MCNC: 3.9 MMOL/L — SIGNIFICANT CHANGE UP (ref 3.5–5.3)
POTASSIUM SERPL-SCNC: 3.9 MMOL/L — SIGNIFICANT CHANGE UP (ref 3.5–5.3)
PROT UR-MCNC: 100 — HIGH
RBC # BLD: 3.85 M/UL — SIGNIFICANT CHANGE UP (ref 3.8–5.2)
RBC # FLD: 14.8 % — HIGH (ref 10.3–14.5)
RBC CASTS # UR COMP ASSIST: SIGNIFICANT CHANGE UP (ref 0–?)
SODIUM SERPL-SCNC: 138 MMOL/L — SIGNIFICANT CHANGE UP (ref 135–145)
SP GR SPEC: 1.02 — SIGNIFICANT CHANGE UP (ref 1–1.03)
SQUAMOUS # UR AUTO: SIGNIFICANT CHANGE UP
TRANS CELLS #/AREA URNS HPF: 1 — SIGNIFICANT CHANGE UP
UROBILINOGEN FLD QL: NORMAL E.U. — SIGNIFICANT CHANGE UP (ref 0.1–0.2)
UUN UR-MCNC: 621.7 MG/DL — SIGNIFICANT CHANGE UP
WBC # BLD: 9 K/UL — SIGNIFICANT CHANGE UP (ref 3.8–10.5)
WBC # FLD AUTO: 9 K/UL — SIGNIFICANT CHANGE UP (ref 3.8–10.5)
WBC UR QL: SIGNIFICANT CHANGE UP (ref 0–?)

## 2017-11-18 RX ORDER — MAGNESIUM SULFATE 500 MG/ML
1 VIAL (ML) INJECTION ONCE
Qty: 0 | Refills: 0 | Status: COMPLETED | OUTPATIENT
Start: 2017-11-18 | End: 2017-11-18

## 2017-11-18 RX ORDER — POTASSIUM CHLORIDE 20 MEQ
40 PACKET (EA) ORAL ONCE
Qty: 0 | Refills: 0 | Status: COMPLETED | OUTPATIENT
Start: 2017-11-18 | End: 2017-11-18

## 2017-11-18 RX ORDER — ACETAMINOPHEN 500 MG
1 TABLET ORAL
Qty: 0 | Refills: 0 | COMMUNITY

## 2017-11-18 RX ADMIN — SODIUM CHLORIDE 3 MILLILITER(S): 9 INJECTION INTRAMUSCULAR; INTRAVENOUS; SUBCUTANEOUS at 21:18

## 2017-11-18 RX ADMIN — Medication 25 MILLIGRAM(S): at 17:44

## 2017-11-18 RX ADMIN — Medication 50 MICROGRAM(S): at 05:51

## 2017-11-18 RX ADMIN — Medication 40 MILLIEQUIVALENT(S): at 21:08

## 2017-11-18 RX ADMIN — SODIUM CHLORIDE 3 MILLILITER(S): 9 INJECTION INTRAMUSCULAR; INTRAVENOUS; SUBCUTANEOUS at 05:52

## 2017-11-18 RX ADMIN — Medication 2: at 08:54

## 2017-11-18 RX ADMIN — HEPARIN SODIUM 5000 UNIT(S): 5000 INJECTION INTRAVENOUS; SUBCUTANEOUS at 05:55

## 2017-11-18 RX ADMIN — MONTELUKAST 10 MILLIGRAM(S): 4 TABLET, CHEWABLE ORAL at 21:08

## 2017-11-18 RX ADMIN — FAMOTIDINE 20 MILLIGRAM(S): 10 INJECTION INTRAVENOUS at 13:03

## 2017-11-18 RX ADMIN — Medication 1: at 17:44

## 2017-11-18 RX ADMIN — INSULIN GLARGINE 65 UNIT(S): 100 INJECTION, SOLUTION SUBCUTANEOUS at 22:47

## 2017-11-18 RX ADMIN — Medication 26 UNIT(S): at 17:44

## 2017-11-18 RX ADMIN — Medication 20 MILLIGRAM(S): at 05:52

## 2017-11-18 RX ADMIN — Medication 24 UNIT(S): at 08:54

## 2017-11-18 RX ADMIN — Medication 100 GRAM(S): at 21:08

## 2017-11-18 RX ADMIN — CLOPIDOGREL BISULFATE 75 MILLIGRAM(S): 75 TABLET, FILM COATED ORAL at 13:03

## 2017-11-18 RX ADMIN — Medication 325 MILLIGRAM(S): at 13:03

## 2017-11-18 RX ADMIN — Medication 26 UNIT(S): at 13:03

## 2017-11-18 RX ADMIN — Medication 25 MILLIGRAM(S): at 05:51

## 2017-11-18 RX ADMIN — SODIUM CHLORIDE 3 MILLILITER(S): 9 INJECTION INTRAMUSCULAR; INTRAVENOUS; SUBCUTANEOUS at 13:03

## 2017-11-18 RX ADMIN — ATORVASTATIN CALCIUM 40 MILLIGRAM(S): 80 TABLET, FILM COATED ORAL at 21:08

## 2017-11-18 NOTE — CONSULT NOTE ADULT - SUBJECTIVE AND OBJECTIVE BOX
Dr. Muhammad (Nephrology)  Office [phone]  Cell [phone]  [facility]  Cell [phone]    HPI:  71 year old Urdu speaking female with HTN, CKD, Hyperlipidemia, DM-II, known CAD with 3V CABG (LIMA to LAD, SVG to OM, SVG to PDA) at [facility] 2014 with recent cath showing patent LIMA-LAD, closed SVG grafts, and a severe stenosis in the ostial Ramus with a plan to perform a staged PCI to RI when her Cr was stable and she was renally optimized and clear of any infection. Pt presents for staged PCI today for her RI lesion. Pt denies any new symptoms since last admission. (17 Nov 2017 11:19)      Allergies:  No Known Allergies      PAST MEDICAL & SURGICAL HISTORY:  GERD (gastroesophageal reflux disease)  CAD (coronary artery disease): s/p CABG  Hypothyroidism  Hyperlipidemia  Diabetes mellitus, type 2  S/P CABG (coronary artery bypass graft)      Home Medications Reviewed    Hospital Medications:   MEDICATIONS  (STANDING):  aspirin enteric coated 325 milliGRAM(s) Oral daily  atorvastatin 40 milliGRAM(s) Oral at bedtime  clopidogrel Tablet 75 milliGRAM(s) Oral daily  dextrose 5%. 1000 milliLiter(s) (50 mL/Hr) IV Continuous <Continuous>  dextrose 50% Injectable 12.5 Gram(s) IV Push once  dextrose 50% Injectable 25 Gram(s) IV Push once  dextrose 50% Injectable 25 Gram(s) IV Push once  famotidine    Tablet 20 milliGRAM(s) Oral daily  furosemide    Tablet 20 milliGRAM(s) Oral daily  heparin  Injectable 5000 Unit(s) SubCutaneous every 12 hours  influenza   Vaccine 0.5 milliLiter(s) IntraMuscular once  insulin glargine Injectable (LANTUS) 65 Unit(s) SubCutaneous at bedtime  insulin lispro (HumaLOG) corrective regimen sliding scale   SubCutaneous three times a day before meals  insulin lispro Injectable (HumaLOG) 24 Unit(s) SubCutaneous before breakfast  insulin lispro Injectable (HumaLOG) 26 Unit(s) SubCutaneous before lunch  insulin lispro Injectable (HumaLOG) 26 Unit(s) SubCutaneous before dinner  levothyroxine 50 MICROGram(s) Oral daily  metoprolol     tartrate 25 milliGRAM(s) Oral two times a day  montelukast 10 milliGRAM(s) Oral at bedtime  sodium chloride 0.9% lock flush 3 milliLiter(s) IV Push every 8 hours  sodium chloride 0.9%. 500 milliLiter(s) (75 mL/Hr) IV Continuous <Continuous>      SOCIAL HISTORY:  Denies ETOh, Smoking,     FAMILY HISTORY:  No pertinent family history in first degree relatives      REVIEW OF SYSTEMS:  CONSTITUTIONAL: No weakness, fevers or chills  EYES/ENT: No visual changes;  No vertigo or throat pain   NECK: No pain or stiffness  RESPIRATORY: No cough, wheezing, hemoptysis; No shortness of breath  CARDIOVASCULAR: No chest pain or palpitations.  GASTROINTESTINAL: No abdominal or epigastric pain. No nausea, vomiting, or hematemesis; No diarrhea or constipation. No melena or hematochezia.  GENITOURINARY: No dysuria, frequency, foamy urine, urinary urgency, incontinence or hematuria  NEUROLOGICAL: No numbness or weakness  SKIN: No itching, burning, rashes, or lesions   VASCULAR: No bilateral lower extremity edema.   All other review of systems is negative unless indicated above.    VITALS:  T(F): 98.6 (11-18-17 @ 05:43), Max: 98.6 (11-18-17 @ 05:43)  HR: 82 (11-18-17 @ 05:43)  BP: 120/61 (11-18-17 @ 05:43)  RR: 18 (11-18-17 @ 05:43)  SpO2: 95% (11-18-17 @ 05:43)  Wt(kg): --      Weight (kg): 57.9 (11-17 @ 16:50)    PHYSICAL EXAM:  Constitutional: NAD  HEENT: anicteric sclera, oropharynx clear, MMM  Neck: No JVD  Respiratory: CTAB, no wheezes, rales or rhonchi  Cardiovascular: S1, S2, RRR  Gastrointestinal: BS+, soft, NT/ND  Extremities: No cyanosis or clubbing. No peripheral edema  Neurological: A/O x 3, no focal deficits  Psychiatric: Normal mood, normal affect  : No CVA tenderness. No cuellar.   Skin: No rashes      LABS:  11-18    138  |  101  |  31<H>  ----------------------------<  255<H>  3.9   |  22  |  1.77<H>    Ca    9.0      18 Nov 2017 09:51  Mg     1.9     11-18      Creatinine Trend: 1.77 <--, 1.58 <--                        11.1   9.00  )-----------( 263      ( 18 Nov 2017 09:51 )             33.9     Urine Studies:        RADIOLOGY & ADDITIONAL STUDIES: Dr. Muhammad (Nephrology)  Office [phone]  Cell [phone]  [facility]  Cell [phone]    HPI:  71 year old Serbian speaking female with HTN, CKD, Hyperlipidemia, DM-II, known CAD with 3V CABG (LIMA to LAD, SVG to OM, SVG to PDA) at [facility] 2014 with recent cath showing patent LIMA-LAD, closed SVG grafts, and a severe stenosis in the ostial Ramus with a plan to perform a staged PCI to RI when her Cr was stable and she was renally optimized and clear of any infection. Pt presents for staged PCI today for her RI lesion. Pt denies any new symptoms since last admission.      Allergies:  No Known Allergies      PAST MEDICAL & SURGICAL HISTORY:  GERD (gastroesophageal reflux disease)  CAD (coronary artery disease): s/p CABG  Hypothyroidism  Hyperlipidemia  Diabetes mellitus, type 2  S/P CABG (coronary artery bypass graft)      Home Medications Reviewed    Hospital Medications:   MEDICATIONS  (STANDING):  aspirin enteric coated 325 milliGRAM(s) Oral daily  atorvastatin 40 milliGRAM(s) Oral at bedtime  clopidogrel Tablet 75 milliGRAM(s) Oral daily  dextrose 5%. 1000 milliLiter(s) (50 mL/Hr) IV Continuous <Continuous>  dextrose 50% Injectable 12.5 Gram(s) IV Push once  dextrose 50% Injectable 25 Gram(s) IV Push once  dextrose 50% Injectable 25 Gram(s) IV Push once  famotidine    Tablet 20 milliGRAM(s) Oral daily  furosemide    Tablet 20 milliGRAM(s) Oral daily  heparin  Injectable 5000 Unit(s) SubCutaneous every 12 hours  influenza   Vaccine 0.5 milliLiter(s) IntraMuscular once  insulin glargine Injectable (LANTUS) 65 Unit(s) SubCutaneous at bedtime  insulin lispro (HumaLOG) corrective regimen sliding scale   SubCutaneous three times a day before meals  insulin lispro Injectable (HumaLOG) 24 Unit(s) SubCutaneous before breakfast  insulin lispro Injectable (HumaLOG) 26 Unit(s) SubCutaneous before lunch  insulin lispro Injectable (HumaLOG) 26 Unit(s) SubCutaneous before dinner  levothyroxine 50 MICROGram(s) Oral daily  metoprolol     tartrate 25 milliGRAM(s) Oral two times a day  montelukast 10 milliGRAM(s) Oral at bedtime  sodium chloride 0.9% lock flush 3 milliLiter(s) IV Push every 8 hours  sodium chloride 0.9%. 500 milliLiter(s) (75 mL/Hr) IV Continuous <Continuous>      SOCIAL HISTORY:  Denies ETOh, Smoking,     FAMILY HISTORY:  No pertinent family history in first degree relatives      REVIEW OF SYSTEMS:  CONSTITUTIONAL: No weakness, fevers or chills  EYES/ENT: No visual changes;  No vertigo or throat pain   NECK: No pain or stiffness  RESPIRATORY: No cough, wheezing, hemoptysis; No shortness of breath  CARDIOVASCULAR: No chest pain or palpitations.  GASTROINTESTINAL: No abdominal or epigastric pain. No nausea, vomiting, or hematemesis; No diarrhea or constipation. No melena or hematochezia.  GENITOURINARY: No dysuria, frequency, foamy urine, urinary urgency, incontinence or hematuria  NEUROLOGICAL: No numbness or weakness  SKIN: No itching, burning, rashes, or lesions   VASCULAR: No bilateral lower extremity edema.   All other review of systems is negative unless indicated above.    VITALS:  T(F): 98.6 (11-18-17 @ 05:43), Max: 98.6 (11-18-17 @ 05:43)  HR: 82 (11-18-17 @ 05:43)  BP: 120/61 (11-18-17 @ 05:43)  RR: 18 (11-18-17 @ 05:43)  SpO2: 95% (11-18-17 @ 05:43)  Wt(kg): --      Weight (kg): 57.9 (11-17 @ 16:50)    PHYSICAL EXAM:  Constitutional: NAD  HEENT: anicteric sclera, oropharynx clear, MMM  Neck: No JVD  Respiratory: CTAB, no wheezes, rales or rhonchi  Cardiovascular: S1, S2, RRR  Gastrointestinal: BS+, soft, NT/ND  Extremities: No cyanosis or clubbing. No peripheral edema  Neurological: A/O x 3, no focal deficits  Psychiatric: Normal mood, normal affect  : No CVA tenderness. No cuellar.   Skin: No rashes      LABS:  11-18    138  |  101  |  31<H>  ----------------------------<  255<H>  3.9   |  22  |  1.77<H>    Ca    9.0      18 Nov 2017 09:51  Mg     1.9     11-18      Creatinine Trend: 1.77 <--, 1.58 <--                        11.1   9.00  )-----------( 263      ( 18 Nov 2017 09:51 )             33.9     Urine Studies:        RADIOLOGY & ADDITIONAL STUDIES:

## 2017-11-18 NOTE — PROGRESS NOTE ADULT - SUBJECTIVE AND OBJECTIVE BOX
Subjective:   	 no chest pain   no shortness of breath       MEDICATIONS:  MEDICATIONS  (STANDING):  aspirin enteric coated 325 milliGRAM(s) Oral daily  atorvastatin 40 milliGRAM(s) Oral at bedtime  clopidogrel Tablet 75 milliGRAM(s) Oral daily  dextrose 5%. 1000 milliLiter(s) (50 mL/Hr) IV Continuous <Continuous>  dextrose 50% Injectable 12.5 Gram(s) IV Push once  dextrose 50% Injectable 25 Gram(s) IV Push once  dextrose 50% Injectable 25 Gram(s) IV Push once  famotidine    Tablet 20 milliGRAM(s) Oral daily  heparin  Injectable 5000 Unit(s) SubCutaneous every 12 hours  influenza   Vaccine 0.5 milliLiter(s) IntraMuscular once  insulin glargine Injectable (LANTUS) 65 Unit(s) SubCutaneous at bedtime  insulin lispro (HumaLOG) corrective regimen sliding scale   SubCutaneous three times a day before meals  insulin lispro Injectable (HumaLOG) 24 Unit(s) SubCutaneous before breakfast  insulin lispro Injectable (HumaLOG) 26 Unit(s) SubCutaneous before lunch  insulin lispro Injectable (HumaLOG) 26 Unit(s) SubCutaneous before dinner  levothyroxine 50 MICROGram(s) Oral daily  metoprolol     tartrate 25 milliGRAM(s) Oral two times a day  montelukast 10 milliGRAM(s) Oral at bedtime  sodium chloride 0.9% lock flush 3 milliLiter(s) IV Push every 8 hours  sodium chloride 0.9%. 500 milliLiter(s) (75 mL/Hr) IV Continuous <Continuous>    MEDICATIONS  (PRN):  dextrose Gel 1 Dose(s) Oral once PRN Blood Glucose LESS THAN 70 milliGRAM(s)/deciliter  glucagon  Injectable 1 milliGRAM(s) IntraMuscular once PRN Glucose LESS THAN 70 milligrams/deciliter      LABS:	 	    CARDIAC MARKERS:                                11.1   9.00  )-----------( 263      ( 18 Nov 2017 09:51 )             33.9     11-18    138  |  101  |  31<H>  ----------------------------<  255<H>  3.9   |  22  |  1.77<H>    Ca    9.0      18 Nov 2017 09:51  Mg     1.9     11-18      COAGS:       proBNP:   Lipid Profile:   HgA1c:   TSH:       PHYSICAL EXAM:  T(C): 36.8 (11-18-17 @ 13:02), Max: 37 (11-18-17 @ 05:43)  HR: 88 (11-18-17 @ 13:02) (70 - 88)  BP: 106/69 (11-18-17 @ 13:02) (106/69 - 131/74)  RR: 18 (11-18-17 @ 13:02) (16 - 18)  SpO2: 96% (11-18-17 @ 13:02) (95% - 100%)  Wt(kg): --  I&O's Summary      Weight (kg): 57.9 (11-17 @ 16:50)    	    Cardiovascular: Normal S1 S2,  No JVD, 1/6 YUSUF murmur, Peripheral pulses palpable 2+ bilaterally  Respiratory: Lungs clear to auscultation, normal effort 	  Gastrointestinal:  Soft, Non-tender, + BS	  Extremities no edema, cyanosis, clubbing B/L LE's                     R Radial site soft non tender no signs of active bleed or hematoma CAp refil WNL's       TELEMETRY: 	    ECG:  	  RADIOLOGY:     DIAGNOSTIC TESTING:  [ ] Echocardiogram: 10/30        [ ]  Catheterization:  [ ] Stress Test:    OTHER: 	      ASSESSMENT/PLAN: 	71y Female with HTN, CKD, Hyperlipidemia, DM-II, known CAD with 3V CABG (LIMA to LAD, SVG to OM, SVG to PDA) at [facility] 2014 with recent cath showing patent LIMA-LAD, closed SVG grafts, and a severe stenosis in the ostial Ramus with a plan to perform a staged PCI to RI when her Cr was stable and she was renally optimized and clear of any infection. Pt presents for staged PCI today for her RI lesion. Subjective:   	 no chest pain   no shortness of breath       MEDICATIONS:  MEDICATIONS  (STANDING):  aspirin enteric coated 325 milliGRAM(s) Oral daily  atorvastatin 40 milliGRAM(s) Oral at bedtime  clopidogrel Tablet 75 milliGRAM(s) Oral daily  dextrose 5%. 1000 milliLiter(s) (50 mL/Hr) IV Continuous <Continuous>  dextrose 50% Injectable 12.5 Gram(s) IV Push once  dextrose 50% Injectable 25 Gram(s) IV Push once  dextrose 50% Injectable 25 Gram(s) IV Push once  famotidine    Tablet 20 milliGRAM(s) Oral daily  heparin  Injectable 5000 Unit(s) SubCutaneous every 12 hours  influenza   Vaccine 0.5 milliLiter(s) IntraMuscular once  insulin glargine Injectable (LANTUS) 65 Unit(s) SubCutaneous at bedtime  insulin lispro (HumaLOG) corrective regimen sliding scale   SubCutaneous three times a day before meals  insulin lispro Injectable (HumaLOG) 24 Unit(s) SubCutaneous before breakfast  insulin lispro Injectable (HumaLOG) 26 Unit(s) SubCutaneous before lunch  insulin lispro Injectable (HumaLOG) 26 Unit(s) SubCutaneous before dinner  levothyroxine 50 MICROGram(s) Oral daily  metoprolol     tartrate 25 milliGRAM(s) Oral two times a day  montelukast 10 milliGRAM(s) Oral at bedtime  sodium chloride 0.9% lock flush 3 milliLiter(s) IV Push every 8 hours  sodium chloride 0.9%. 500 milliLiter(s) (75 mL/Hr) IV Continuous <Continuous>    MEDICATIONS  (PRN):  dextrose Gel 1 Dose(s) Oral once PRN Blood Glucose LESS THAN 70 milliGRAM(s)/deciliter  glucagon  Injectable 1 milliGRAM(s) IntraMuscular once PRN Glucose LESS THAN 70 milligrams/deciliter      LABS:	 	    CARDIAC MARKERS:                                11.1   9.00  )-----------( 263      ( 18 Nov 2017 09:51 )             33.9     11-18    138  |  101  |  31<H>  ----------------------------<  255<H>  3.9   |  22  |  1.77<H>    Ca    9.0      18 Nov 2017 09:51  Mg     1.9     11-18      COAGS:       proBNP:   Lipid Profile:   HgA1c:   TSH:       PHYSICAL EXAM:  T(C): 36.8 (11-18-17 @ 13:02), Max: 37 (11-18-17 @ 05:43)  HR: 88 (11-18-17 @ 13:02) (70 - 88)  BP: 106/69 (11-18-17 @ 13:02) (106/69 - 131/74)  RR: 18 (11-18-17 @ 13:02) (16 - 18)  SpO2: 96% (11-18-17 @ 13:02) (95% - 100%)  Wt(kg): --  I&O's Summary      Weight (kg): 57.9 (11-17 @ 16:50)    	    Cardiovascular: Normal S1 S2,  No JVD, 1/6 YUSUF murmur, Peripheral pulses palpable 2+ bilaterally  Respiratory: Lungs clear to auscultation, normal effort 	  Gastrointestinal:  Soft, Non-tender, + BS	  Extremities no edema, cyanosis, clubbing B/L LE's                     R Radial site soft non tender no signs of active bleed or hematoma CAp refil WNL's       TELEMETRY: 	    ECG:  	  RADIOLOGY:     DIAGNOSTIC TESTING:  [ ] Echocardiogram: 10/30      < from: TTE with Doppler (w/Cont) (10.30.14 @ 14:02) >  CONCLUSIONS:  1. Mitral annular calcification, otherwise normal mitral  valve. Mild mitral regurgitation.  2. Calcified trileaflet aortic valve with normal opening.  No aortic valve regurgitation seen.  3. Normal left atrium  4. Moderate-severe segmental left ventricular systolic  dysfunction. EF 35%. The distal and apical segments are  akinetic. The septum is hypokinetic.  5. Normal right atrium.  6. Normal right ventricular size and function.  7. Estimated right ventricular systolic pressure equals 30  mm Hg, assuming right atrial pressure equals 10 mm Hg,  consistent with normal pulmonary pressures.  8. Normal tricuspid valve. Minimal tricuspid regurgitation.    < end of copied text >    [ ]  Catheterization:     < from: Cardiac Cath Lab - Adult (11.17.17 @ 11:48) >   A drug-eluting stent was performed on the 95 % lesion in the  ramus intermedius. Following intervention there was an excellent  CORONARY VESSELS: The coronary circulation is right dominant.  LM:   --  LM: Angiography showed minor luminal irregularities with no flow  limiting lesions.  LAD:   --  Ostial LAD: There was a 100 % stenosis.  CX:   --  Circumflex: This vessel was not injected, but was visualized  during a prior cardiac catheterization.  RI:   --  Ramus intermedius: There was a 95 % stenosis.  RCA:   --  RCA: This vessel was not injected.  COMPLICATIONS: There were no complications.  DIAGNOSTIC RECOMMENDATIONS: The patient should continue with the present  medications. will add plavix 75mg po once a day/ will add ECASA 325mg po  once day.    < end of copied text >    [ ] Stress Test:    OTHER: 	      ASSESSMENT/PLAN: 	71y Female with HTN, CKD, Hyperlipidemia, DM-II, known CAD with 3V CABG (LIMA to LAD, SVG to OM, SVG to PDA) at [facility] 2014 with recent cath showing patent LIMA-LAD, closed SVG grafts, and a severe stenosis in the ostial Ramus with a plan to perform a staged PCI to RI when her Cr was stable and she was renally optimized and clear of any infection.    s/p CORNELIA to RI  C/W DAPT & BB   MERVIN renal input appreciated   Lasix held   US renal pending   f/u BMP in AM

## 2017-11-18 NOTE — CONSULT NOTE ADULT - PROBLEM SELECTOR RECOMMENDATION 9
baseline ~1.4-1.6, renal function slightly worsen today possible sec to contrast s/p cardiac cath done 11/17 vs prerenal sec to hyperglycemia on lasix. patient clinically looks euvolemic, would hold lasix for now. check urine cr, urea, eos, UA, renal US. monitor renal function

## 2017-11-18 NOTE — CONSULT NOTE ADULT - ASSESSMENT
71 year old Faroese speaking female with HTN, CKD, Hyperlipidemia, DM-II, known CAD with 3V CABG (LIMA to LAD, SVG to OM, SVG to PDA) at [facility] 2014 with recent cath showing patent LIMA-LAD, closed SVG grafts, and a severe stenosis in the ostial Ramus with a plan to perform a staged PCI to RI

## 2017-11-19 LAB
BUN SERPL-MCNC: 44 MG/DL — HIGH (ref 7–23)
CALCIUM SERPL-MCNC: 10 MG/DL — SIGNIFICANT CHANGE UP (ref 8.4–10.5)
CHLORIDE SERPL-SCNC: 102 MMOL/L — SIGNIFICANT CHANGE UP (ref 98–107)
CO2 SERPL-SCNC: 19 MMOL/L — LOW (ref 22–31)
CREAT SERPL-MCNC: 1.85 MG/DL — HIGH (ref 0.5–1.3)
GLUCOSE BLDC GLUCOMTR-MCNC: 174 MG/DL — HIGH (ref 70–99)
GLUCOSE BLDC GLUCOMTR-MCNC: 179 MG/DL — HIGH (ref 70–99)
GLUCOSE BLDC GLUCOMTR-MCNC: 226 MG/DL — HIGH (ref 70–99)
GLUCOSE BLDC GLUCOMTR-MCNC: 232 MG/DL — HIGH (ref 70–99)
GLUCOSE SERPL-MCNC: 232 MG/DL — HIGH (ref 70–99)
HCT VFR BLD CALC: 34.6 % — SIGNIFICANT CHANGE UP (ref 34.5–45)
HGB BLD-MCNC: 11 G/DL — LOW (ref 11.5–15.5)
MAGNESIUM SERPL-MCNC: 2.2 MG/DL — SIGNIFICANT CHANGE UP (ref 1.6–2.6)
MCHC RBC-ENTMCNC: 28.5 PG — SIGNIFICANT CHANGE UP (ref 27–34)
MCHC RBC-ENTMCNC: 31.8 % — LOW (ref 32–36)
MCV RBC AUTO: 89.6 FL — SIGNIFICANT CHANGE UP (ref 80–100)
NRBC # FLD: 0 — SIGNIFICANT CHANGE UP
PHOSPHATE SERPL-MCNC: 4.5 MG/DL — SIGNIFICANT CHANGE UP (ref 2.5–4.5)
PLATELET # BLD AUTO: 271 K/UL — SIGNIFICANT CHANGE UP (ref 150–400)
PMV BLD: 9.2 FL — SIGNIFICANT CHANGE UP (ref 7–13)
POTASSIUM SERPL-MCNC: 4.2 MMOL/L — SIGNIFICANT CHANGE UP (ref 3.5–5.3)
POTASSIUM SERPL-SCNC: 4.2 MMOL/L — SIGNIFICANT CHANGE UP (ref 3.5–5.3)
RBC # BLD: 3.86 M/UL — SIGNIFICANT CHANGE UP (ref 3.8–5.2)
RBC # FLD: 14.4 % — SIGNIFICANT CHANGE UP (ref 10.3–14.5)
SODIUM SERPL-SCNC: 138 MMOL/L — SIGNIFICANT CHANGE UP (ref 135–145)
WBC # BLD: 9.15 K/UL — SIGNIFICANT CHANGE UP (ref 3.8–10.5)
WBC # FLD AUTO: 9.15 K/UL — SIGNIFICANT CHANGE UP (ref 3.8–10.5)

## 2017-11-19 PROCEDURE — 76770 US EXAM ABDO BACK WALL COMP: CPT | Mod: 26

## 2017-11-19 RX ADMIN — SODIUM CHLORIDE 3 MILLILITER(S): 9 INJECTION INTRAMUSCULAR; INTRAVENOUS; SUBCUTANEOUS at 21:24

## 2017-11-19 RX ADMIN — MONTELUKAST 10 MILLIGRAM(S): 4 TABLET, CHEWABLE ORAL at 21:24

## 2017-11-19 RX ADMIN — Medication 26 UNIT(S): at 17:10

## 2017-11-19 RX ADMIN — SODIUM CHLORIDE 3 MILLILITER(S): 9 INJECTION INTRAMUSCULAR; INTRAVENOUS; SUBCUTANEOUS at 12:55

## 2017-11-19 RX ADMIN — ATORVASTATIN CALCIUM 40 MILLIGRAM(S): 80 TABLET, FILM COATED ORAL at 21:24

## 2017-11-19 RX ADMIN — Medication 26 UNIT(S): at 12:54

## 2017-11-19 RX ADMIN — FAMOTIDINE 20 MILLIGRAM(S): 10 INJECTION INTRAVENOUS at 12:55

## 2017-11-19 RX ADMIN — Medication 325 MILLIGRAM(S): at 12:54

## 2017-11-19 RX ADMIN — SODIUM CHLORIDE 3 MILLILITER(S): 9 INJECTION INTRAMUSCULAR; INTRAVENOUS; SUBCUTANEOUS at 06:12

## 2017-11-19 RX ADMIN — HEPARIN SODIUM 5000 UNIT(S): 5000 INJECTION INTRAVENOUS; SUBCUTANEOUS at 05:45

## 2017-11-19 RX ADMIN — Medication 2: at 17:10

## 2017-11-19 RX ADMIN — Medication 2: at 08:51

## 2017-11-19 RX ADMIN — Medication 24 UNIT(S): at 08:51

## 2017-11-19 RX ADMIN — Medication 25 MILLIGRAM(S): at 05:45

## 2017-11-19 RX ADMIN — HEPARIN SODIUM 5000 UNIT(S): 5000 INJECTION INTRAVENOUS; SUBCUTANEOUS at 17:11

## 2017-11-19 RX ADMIN — Medication 25 MILLIGRAM(S): at 17:11

## 2017-11-19 RX ADMIN — Medication 50 MICROGRAM(S): at 05:45

## 2017-11-19 RX ADMIN — CLOPIDOGREL BISULFATE 75 MILLIGRAM(S): 75 TABLET, FILM COATED ORAL at 12:54

## 2017-11-19 RX ADMIN — Medication 1: at 12:54

## 2017-11-19 RX ADMIN — INSULIN GLARGINE 65 UNIT(S): 100 INJECTION, SOLUTION SUBCUTANEOUS at 21:52

## 2017-11-19 NOTE — PROGRESS NOTE ADULT - SUBJECTIVE AND OBJECTIVE BOX
Subjective:   	 no chest pain   no shortness of breath         MEDICATIONS  (STANDING):  aspirin enteric coated 325 milliGRAM(s) Oral daily  atorvastatin 40 milliGRAM(s) Oral at bedtime  clopidogrel Tablet 75 milliGRAM(s) Oral daily  dextrose 5%. 1000 milliLiter(s) (50 mL/Hr) IV Continuous <Continuous>  dextrose 50% Injectable 12.5 Gram(s) IV Push once  dextrose 50% Injectable 25 Gram(s) IV Push once  dextrose 50% Injectable 25 Gram(s) IV Push once  famotidine    Tablet 20 milliGRAM(s) Oral daily  heparin  Injectable 5000 Unit(s) SubCutaneous every 12 hours  influenza   Vaccine 0.5 milliLiter(s) IntraMuscular once  insulin glargine Injectable (LANTUS) 65 Unit(s) SubCutaneous at bedtime  insulin lispro (HumaLOG) corrective regimen sliding scale   SubCutaneous three times a day before meals  insulin lispro Injectable (HumaLOG) 24 Unit(s) SubCutaneous before breakfast  insulin lispro Injectable (HumaLOG) 26 Unit(s) SubCutaneous before lunch  insulin lispro Injectable (HumaLOG) 26 Unit(s) SubCutaneous before dinner  levothyroxine 50 MICROGram(s) Oral daily  metoprolol     tartrate 25 milliGRAM(s) Oral two times a day  montelukast 10 milliGRAM(s) Oral at bedtime  sodium chloride 0.9% lock flush 3 milliLiter(s) IV Push every 8 hours  sodium chloride 0.9%. 500 milliLiter(s) (75 mL/Hr) IV Continuous <Continuous>    MEDICATIONS  (PRN):  dextrose Gel 1 Dose(s) Oral once PRN Blood Glucose LESS THAN 70 milliGRAM(s)/deciliter  glucagon  Injectable 1 milliGRAM(s) IntraMuscular once PRN Glucose LESS THAN 70 milligrams/deciliter      LABS:                        11.0   9.15  )-----------( 271      ( 19 Nov 2017 05:57 )             34.6     Hemoglobin: 11.0 g/dL (11-19 @ 05:57)  Hemoglobin: 11.1 g/dL (11-18 @ 09:51)  Hemoglobin: 11.4 g/dL (11-17 @ 11:15)    11-19    138  |  102  |  44<H>  ----------------------------<  232<H>  4.2   |  19<L>  |  1.85<H>    Ca    10.0      19 Nov 2017 05:57  Phos  4.5     11-19  Mg     2.2     11-19      Creatinine Trend: 1.85<--, 1.77<--, 1.58<--           PHYSICAL EXAM  Vital Signs Last 24 Hrs  T(C): 36.7 (19 Nov 2017 11:15), Max: 36.9 (19 Nov 2017 05:01)  T(F): 98 (19 Nov 2017 11:15), Max: 98.4 (19 Nov 2017 05:01)  HR: 73 (19 Nov 2017 11:15) (72 - 88)  BP: 125/47 (19 Nov 2017 11:15) (121/75 - 138/61)  BP(mean): --  RR: 17 (19 Nov 2017 11:15) (17 - 18)  SpO2: 95% (19 Nov 2017 11:15) (94% - 97%)      Cardiovascular: Normal S1 S2,  No JVD, 1/6 YUSUF murmur, Peripheral pulses palpable 2+ bilaterally  Respiratory: Lungs clear to auscultation, normal effort 	  Gastrointestinal:  Soft, Non-tender, + BS	  Extremities no edema, cyanosis, clubbing B/L LE's                     R Radial site soft non tender no signs of active bleed or hematoma CAp refil WNL's       TELEMETRY: 	NSR     ECG:  	  RADIOLOGY:     DIAGNOSTIC TESTING:  [ ] Echocardiogram: 10/30      < from: TTE with Doppler (w/Cont) (10.30.14 @ 14:02) >  CONCLUSIONS:  1. Mitral annular calcification, otherwise normal mitral  valve. Mild mitral regurgitation.  2. Calcified trileaflet aortic valve with normal opening.  No aortic valve regurgitation seen.  3. Normal left atrium  4. Moderate-severe segmental left ventricular systolic  dysfunction. EF 35%. The distal and apical segments are  akinetic. The septum is hypokinetic.  5. Normal right atrium.  6. Normal right ventricular size and function.  7. Estimated right ventricular systolic pressure equals 30  mm Hg, assuming right atrial pressure equals 10 mm Hg,  consistent with normal pulmonary pressures.  8. Normal tricuspid valve. Minimal tricuspid regurgitation.    < end of copied text >    [ ]  Catheterization:     < from: Cardiac Cath Lab - Adult (11.17.17 @ 11:48) >   A drug-eluting stent was performed on the 95 % lesion in the  ramus intermedius. Following intervention there was an excellent  CORONARY VESSELS: The coronary circulation is right dominant.  LM:   --  LM: Angiography showed minor luminal irregularities with no flow  limiting lesions.  LAD:   --  Ostial LAD: There was a 100 % stenosis.  CX:   --  Circumflex: This vessel was not injected, but was visualized  during a prior cardiac catheterization.  RI:   --  Ramus intermedius: There was a 95 % stenosis.  RCA:   --  RCA: This vessel was not injected.  COMPLICATIONS: There were no complications.  DIAGNOSTIC RECOMMENDATIONS: The patient should continue with the present  medications. will add plavix 75mg po once a day/ will add ECASA 325mg po  once day.    < end of copied text >    [ ] Stress Test:    OTHER: 	      ASSESSMENT/PLAN: 	71y Female with HTN, CKD, Hyperlipidemia, DM-II, known CAD with 3V CABG (LIMA to LAD, SVG to OM, SVG to PDA) at [facility] 2014 with recent cath showing patent LIMA-LAD, closed SVG grafts, and a severe stenosis in the ostial Ramus with a plan to perform a staged PCI to RI when her Cr was stable and she was renally optimized and clear of any infection.    s/p CORNELIA to RI  C/W DAPT & BB   MERVIN renal input appreciated   Lasix held   US renal pending results --performed --  f/u BMP in AM

## 2017-11-19 NOTE — PROGRESS NOTE ADULT - ATTENDING COMMENTS
Patient seen and examined. Agree with above. Cr continues to increase. Would not d/c home. F/U renal. Repeat BMP tomorrow.

## 2017-11-19 NOTE — PROGRESS NOTE ADULT - SUBJECTIVE AND OBJECTIVE BOX
Dr. Muhammad  Office [phone]  Cell [phone]  [facility]  Cell [phone]      Patient is a 71y old  Female who presents with a chief complaint of Staged PCI (17 Nov 2017 22:27)      Patient seen and examined at bedside. No chest pain/sob    VITALS:  T(F): 98 (11-19-17 @ 11:15), Max: 98.4 (11-19-17 @ 05:01)  HR: 71 (11-19-17 @ 17:02)  BP: 118/49 (11-19-17 @ 17:02)  RR: 17 (11-19-17 @ 17:02)  SpO2: 95% (11-19-17 @ 11:15)  Wt(kg): --        PHYSICAL EXAM:  Constitutional: NAD  Neck: No JVD  Respiratory: CTAB, no wheezes, rales or rhonchi  Cardiovascular: S1, S2, RRR  Gastrointestinal: BS+, soft, NT/ND  Extremities: No peripheral edema    Hospital Medications:   MEDICATIONS  (STANDING):  aspirin enteric coated 325 milliGRAM(s) Oral daily  atorvastatin 40 milliGRAM(s) Oral at bedtime  clopidogrel Tablet 75 milliGRAM(s) Oral daily  dextrose 5%. 1000 milliLiter(s) (50 mL/Hr) IV Continuous <Continuous>  dextrose 50% Injectable 12.5 Gram(s) IV Push once  dextrose 50% Injectable 25 Gram(s) IV Push once  dextrose 50% Injectable 25 Gram(s) IV Push once  famotidine    Tablet 20 milliGRAM(s) Oral daily  heparin  Injectable 5000 Unit(s) SubCutaneous every 12 hours  influenza   Vaccine 0.5 milliLiter(s) IntraMuscular once  insulin glargine Injectable (LANTUS) 65 Unit(s) SubCutaneous at bedtime  insulin lispro (HumaLOG) corrective regimen sliding scale   SubCutaneous three times a day before meals  insulin lispro Injectable (HumaLOG) 24 Unit(s) SubCutaneous before breakfast  insulin lispro Injectable (HumaLOG) 26 Unit(s) SubCutaneous before lunch  insulin lispro Injectable (HumaLOG) 26 Unit(s) SubCutaneous before dinner  levothyroxine 50 MICROGram(s) Oral daily  metoprolol     tartrate 25 milliGRAM(s) Oral two times a day  montelukast 10 milliGRAM(s) Oral at bedtime  sodium chloride 0.9% lock flush 3 milliLiter(s) IV Push every 8 hours  sodium chloride 0.9%. 500 milliLiter(s) (75 mL/Hr) IV Continuous <Continuous>      LABS:  11-19    138  |  102  |  44<H>  ----------------------------<  232<H>  4.2   |  19<L>  |  1.85<H>    Ca    10.0      19 Nov 2017 05:57  Phos  4.5     11-19  Mg     2.2     11-19      Creatinine Trend: 1.85 <--, 1.77 <--, 1.58 <--    Phosphorus Level, Serum: 4.5 mg/dL (11-19 @ 05:57)                              11.0   9.15  )-----------( 271      ( 19 Nov 2017 05:57 )             34.6     Urine Studies:  Urinalysis - [11-18-17 @ 18:30]      Color YELLOW / Appearance HAZY / SG 1.018 / pH 5.5      Gluc 50 / Ketone NEGATIVE  / Bili NEGATIVE / Urobili NORMAL       Blood NEGATIVE / Protein 100 / Leuk Est SMALL / Nitrite NEGATIVE      RBC 0-2 / WBC 10-25 / Hyaline 2-5 / Gran  / Sq Epi OCC / Non Sq Epi  / Bacteria     Urine Creatinine 118.86      [11-18-17 @ 18:30]  Urine Urea Nitrogen 621.7      [11-18-17 @ 18:30]    HbA1c 10.4      [10-13-17 @ 08:45]        RADIOLOGY & ADDITIONAL STUDIES:

## 2017-11-20 VITALS
HEART RATE: 72 BPM | RESPIRATION RATE: 18 BRPM | OXYGEN SATURATION: 98 % | SYSTOLIC BLOOD PRESSURE: 139 MMHG | TEMPERATURE: 97 F | DIASTOLIC BLOOD PRESSURE: 60 MMHG

## 2017-11-20 LAB
BACTERIA UR CULT: SIGNIFICANT CHANGE UP
BUN SERPL-MCNC: 42 MG/DL — HIGH (ref 7–23)
CALCIUM SERPL-MCNC: 9.9 MG/DL — SIGNIFICANT CHANGE UP (ref 8.4–10.5)
CHLORIDE SERPL-SCNC: 105 MMOL/L — SIGNIFICANT CHANGE UP (ref 98–107)
CO2 SERPL-SCNC: 18 MMOL/L — LOW (ref 22–31)
CREAT SERPL-MCNC: 1.77 MG/DL — HIGH (ref 0.5–1.3)
GLUCOSE BLDC GLUCOMTR-MCNC: 215 MG/DL — HIGH (ref 70–99)
GLUCOSE BLDC GLUCOMTR-MCNC: 223 MG/DL — HIGH (ref 70–99)
GLUCOSE SERPL-MCNC: 233 MG/DL — HIGH (ref 70–99)
HCT VFR BLD CALC: 33.9 % — LOW (ref 34.5–45)
HGB BLD-MCNC: 11.4 G/DL — LOW (ref 11.5–15.5)
MAGNESIUM SERPL-MCNC: 2 MG/DL — SIGNIFICANT CHANGE UP (ref 1.6–2.6)
MCHC RBC-ENTMCNC: 30 PG — SIGNIFICANT CHANGE UP (ref 27–34)
MCHC RBC-ENTMCNC: 33.6 % — SIGNIFICANT CHANGE UP (ref 32–36)
MCV RBC AUTO: 89.2 FL — SIGNIFICANT CHANGE UP (ref 80–100)
NRBC # FLD: 0 — SIGNIFICANT CHANGE UP
PHOSPHATE SERPL-MCNC: 3.8 MG/DL — SIGNIFICANT CHANGE UP (ref 2.5–4.5)
PLATELET # BLD AUTO: 254 K/UL — SIGNIFICANT CHANGE UP (ref 150–400)
PMV BLD: 9.3 FL — SIGNIFICANT CHANGE UP (ref 7–13)
POTASSIUM SERPL-MCNC: 4.4 MMOL/L — SIGNIFICANT CHANGE UP (ref 3.5–5.3)
POTASSIUM SERPL-SCNC: 4.4 MMOL/L — SIGNIFICANT CHANGE UP (ref 3.5–5.3)
RBC # BLD: 3.8 M/UL — SIGNIFICANT CHANGE UP (ref 3.8–5.2)
RBC # FLD: 14.3 % — SIGNIFICANT CHANGE UP (ref 10.3–14.5)
SODIUM SERPL-SCNC: 139 MMOL/L — SIGNIFICANT CHANGE UP (ref 135–145)
SPECIMEN SOURCE: SIGNIFICANT CHANGE UP
WBC # BLD: 9.12 K/UL — SIGNIFICANT CHANGE UP (ref 3.8–10.5)
WBC # FLD AUTO: 9.12 K/UL — SIGNIFICANT CHANGE UP (ref 3.8–10.5)

## 2017-11-20 RX ORDER — CLOPIDOGREL BISULFATE 75 MG/1
1 TABLET, FILM COATED ORAL
Qty: 30 | Refills: 0 | OUTPATIENT
Start: 2017-11-20 | End: 2017-12-20

## 2017-11-20 RX ADMIN — SODIUM CHLORIDE 3 MILLILITER(S): 9 INJECTION INTRAMUSCULAR; INTRAVENOUS; SUBCUTANEOUS at 13:06

## 2017-11-20 RX ADMIN — CLOPIDOGREL BISULFATE 75 MILLIGRAM(S): 75 TABLET, FILM COATED ORAL at 11:17

## 2017-11-20 RX ADMIN — HEPARIN SODIUM 5000 UNIT(S): 5000 INJECTION INTRAVENOUS; SUBCUTANEOUS at 05:30

## 2017-11-20 RX ADMIN — Medication 2: at 13:05

## 2017-11-20 RX ADMIN — Medication 25 MILLIGRAM(S): at 05:30

## 2017-11-20 RX ADMIN — SODIUM CHLORIDE 3 MILLILITER(S): 9 INJECTION INTRAMUSCULAR; INTRAVENOUS; SUBCUTANEOUS at 05:29

## 2017-11-20 RX ADMIN — Medication 26 UNIT(S): at 13:05

## 2017-11-20 RX ADMIN — Medication 2: at 08:58

## 2017-11-20 RX ADMIN — Medication 325 MILLIGRAM(S): at 11:17

## 2017-11-20 RX ADMIN — Medication 50 MICROGRAM(S): at 05:30

## 2017-11-20 RX ADMIN — FAMOTIDINE 20 MILLIGRAM(S): 10 INJECTION INTRAVENOUS at 11:17

## 2017-11-20 RX ADMIN — Medication 24 UNIT(S): at 08:58

## 2017-11-20 NOTE — PROGRESS NOTE ADULT - SUBJECTIVE AND OBJECTIVE BOX
Subjective:  no chest pain or shortness of breath        MEDICATIONS  (STANDING):  aspirin enteric coated 325 milliGRAM(s) Oral daily  atorvastatin 40 milliGRAM(s) Oral at bedtime  clopidogrel Tablet 75 milliGRAM(s) Oral daily  famotidine    Tablet 20 milliGRAM(s) Oral daily  heparin  Injectable 5000 Unit(s) SubCutaneous every 12 hours  influenza   Vaccine 0.5 milliLiter(s) IntraMuscular once  insulin glargine Injectable (LANTUS) 65 Unit(s) SubCutaneous at bedtime  insulin lispro (HumaLOG) corrective regimen sliding scale   SubCutaneous three times a day before meals  insulin lispro Injectable (HumaLOG) 24 Unit(s) SubCutaneous before breakfast  insulin lispro Injectable (HumaLOG) 26 Unit(s) SubCutaneous before lunch  insulin lispro Injectable (HumaLOG) 26 Unit(s) SubCutaneous before dinner  levothyroxine 50 MICROGram(s) Oral daily  metoprolol     tartrate 25 milliGRAM(s) Oral two times a day  montelukast 10 milliGRAM(s) Oral at bedtime  sodium chloride 0.9% lock flush 3 milliLiter(s) IV Push every 8 hours  sodium chloride 0.9%. 500 milliLiter(s) (75 mL/Hr) IV Continuous <Continuous>    MEDICATIONS  (PRN):  dextrose Gel 1 Dose(s) Oral once PRN Blood Glucose LESS THAN 70 milliGRAM(s)/deciliter  glucagon  Injectable 1 milliGRAM(s) IntraMuscular once PRN Glucose LESS THAN 70 milligrams/deciliter      LABS:                        11.4   9.12  )-----------( 254      ( 20 Nov 2017 05:42 )             33.9     139  |  105  |  42<H>  ----------------------------<  233<H>  4.4   |  18<L>  |  1.77<H>    Ca    9.9      20 Nov 2017 05:42  Phos  3.8     11-20  Mg     2.0     11-20    Creatinine Trend: 1.77<--, 1.85<--, 1.77<--, 1.58<--    PHYSICAL EXAM  Vital Signs Last 24 Hrs  T(C): 36.3 (20 Nov 2017 05:20), Max: 36.3 (19 Nov 2017 20:29)  T(F): 97.3 (20 Nov 2017 05:20), Max: 97.4 (19 Nov 2017 20:29)  HR: 72 (20 Nov 2017 05:20) (71 - 74)  BP: 139/60 (20 Nov 2017 05:20) (118/49 - 143/75)  RR: 18 (20 Nov 2017 05:20) (17 - 18)  SpO2: 98% (20 Nov 2017 05:20) (98% - 100%)      Cardiovascular: Normal S1 S2,  No JVD, 1/6 YUSUF murmur, Peripheral pulses palpable 2+ bilaterally  Respiratory: Lungs clear to auscultation, normal effort 	  Gastrointestinal:  Soft, Non-tender, + BS	  Extremities no edema, cyanosis, clubbing B/L LE's     TELEMETRY: 	NSR       DIAGNOSTIC TESTING:    < from: Cardiac Cath Lab - Adult (11.17.17 @ 11:48) >  VENTRICLES: No LV gram was performed; however, a recent echocardiogram  demonstrated normal global and regional LV function.  CORONARY VESSELS: The coronary circulation is right dominant.  LM:   --  LM: Angiography showed minor luminal irregularities with no flow  limiting lesions.  LAD:   --  Ostial LAD: There was a 100 % stenosis.  CX:   --  Circumflex: This vessel was not injected, but was visualized  during a prior cardiac catheterization.  RI:   --  Ramus intermedius: There was a 95 % stenosis.  RCA:   --  RCA: This vessel was not injected.  COMPLICATIONS: There were no complications.  DIAGNOSTIC RECOMMENDATIONS: The patient should continue with the present  medications. will add plavix 75mg po once a day/ will add ECASA 325mg po  once day.  Prepared and signed by  Roberta Quick M.D.  Signed 11/17/2017 13:16:01    < end of copied text >      < from: US Kidney and Bladder (11.19.17 @ 13:25) >  IMPRESSION:     Left upper pole renal cyst corresponding to the CT finding.  No evidence of hydronephrosis.      BELGICA IGLESIAS M.D., ATTENDING RADIOLOGIST  This document has been electronically signed. Nov 19 2017  3:25PM    < end of copied text >      ASSESSMENT/PLAN: 	71y Female with HTN, CKD, Hyperlipidemia, DM-II, known CAD with 3V CABG (LIMA to LAD, SVG to OM, SVG to PDA) at [facility] 2014 with recent cath showing patent LIMA-LAD, closed SVG grafts, and a severe stenosis in the ostial Ramus s/p CORNELIA x2    --MERVIN, lasix held, s/p Renal US  --Cont DAPT, Statin, Metoprolol  --F/u RENAL  --DC planning when stable from renal perspective  --Follow up with- Dr. Quick on 11/21/17 at 12:30pm      Juliane Fitch PA-C  [facility]  [address], [address]   [place] [address]  office [phone]  pager [phone]

## 2017-11-20 NOTE — PROGRESS NOTE ADULT - SUBJECTIVE AND OBJECTIVE BOX
Dr. Muhammad (Nephrology)  Office [phone]  Cell [phone]  [facility]  Cell [phone]      Patient is a 71y old  Female who presents with a chief complaint of Staged PCI (17 Nov 2017 22:27)      Patient seen and examined at bedside. No chest pain/sob    VITALS:  T(F): 97.3 (11-20-17 @ 05:20), Max: 97.4 (11-19-17 @ 20:29)  HR: 72 (11-20-17 @ 05:20)  BP: 139/60 (11-20-17 @ 05:20)  RR: 18 (11-20-17 @ 05:20)  SpO2: 98% (11-20-17 @ 05:20)  Wt(kg): --        PHYSICAL EXAM:  Constitutional: NAD  Neck: No JVD  Respiratory: CTAB, no wheezes, rales or rhonchi  Cardiovascular: S1, S2, RRR  Gastrointestinal: BS+, soft, NT/ND  Extremities: No peripheral edema    Hospital Medications:   MEDICATIONS  (STANDING):  aspirin enteric coated 325 milliGRAM(s) Oral daily  atorvastatin 40 milliGRAM(s) Oral at bedtime  clopidogrel Tablet 75 milliGRAM(s) Oral daily  dextrose 5%. 1000 milliLiter(s) (50 mL/Hr) IV Continuous <Continuous>  dextrose 50% Injectable 12.5 Gram(s) IV Push once  dextrose 50% Injectable 25 Gram(s) IV Push once  dextrose 50% Injectable 25 Gram(s) IV Push once  famotidine    Tablet 20 milliGRAM(s) Oral daily  heparin  Injectable 5000 Unit(s) SubCutaneous every 12 hours  influenza   Vaccine 0.5 milliLiter(s) IntraMuscular once  insulin glargine Injectable (LANTUS) 65 Unit(s) SubCutaneous at bedtime  insulin lispro (HumaLOG) corrective regimen sliding scale   SubCutaneous three times a day before meals  insulin lispro Injectable (HumaLOG) 24 Unit(s) SubCutaneous before breakfast  insulin lispro Injectable (HumaLOG) 26 Unit(s) SubCutaneous before lunch  insulin lispro Injectable (HumaLOG) 26 Unit(s) SubCutaneous before dinner  levothyroxine 50 MICROGram(s) Oral daily  metoprolol     tartrate 25 milliGRAM(s) Oral two times a day  montelukast 10 milliGRAM(s) Oral at bedtime  sodium chloride 0.9% lock flush 3 milliLiter(s) IV Push every 8 hours  sodium chloride 0.9%. 500 milliLiter(s) (75 mL/Hr) IV Continuous <Continuous>      LABS:  11-20    139  |  105  |  42<H>  ----------------------------<  233<H>  4.4   |  18<L>  |  1.77<H>    Ca    9.9      20 Nov 2017 05:42  Phos  3.8     11-20  Mg     2.0     11-20      Creatinine Trend: 1.77 <--, 1.85 <--, 1.77 <--, 1.58 <--    Phosphorus Level, Serum: 3.8 mg/dL (11-20 @ 05:42)                              11.4   9.12  )-----------( 254      ( 20 Nov 2017 05:42 )             33.9     Urine Studies:  Urinalysis - [11-18-17 @ 18:30]      Color YELLOW / Appearance HAZY / SG 1.018 / pH 5.5      Gluc 50 / Ketone NEGATIVE  / Bili NEGATIVE / Urobili NORMAL       Blood NEGATIVE / Protein 100 / Leuk Est SMALL / Nitrite NEGATIVE      RBC 0-2 / WBC 10-25 / Hyaline 2-5 / Gran  / Sq Epi OCC / Non Sq Epi  / Bacteria     Urine Creatinine 118.86      [11-18-17 @ 18:30]  Urine Urea Nitrogen 621.7      [11-18-17 @ 18:30]    HbA1c 10.4      [10-13-17 @ 08:45]        RADIOLOGY & ADDITIONAL STUDIES:

## 2017-11-20 NOTE — PROGRESS NOTE ADULT - ATTENDING COMMENTS
Patient seen and examined.  Agree with above.   -Continue with DAPT for CORNELIA placement  -DC home when ok with renal    Corie Ga MD  [facility]  [address], [address]  [place] [address]  office: [phone]  pager: [phone]

## 2017-11-20 NOTE — PROGRESS NOTE ADULT - PROBLEM SELECTOR PLAN 1
baseline ~1.4-1.6, renal function slightly worsen possible sec to contrast s/p cardiac cath done 11/17 vs prerenal sec to hyperglycemia on lasix. now improving. clear for D/C from renal stand point. can follow up with Dr. Muhammad in 2 weeks. lasix 20mg daily can be resumed on discharge.   Continue to hold lasix for now.   Monitor renal function.
baseline ~1.4-1.6, renal function slightly worsen today possible sec to contrast s/p cardiac cath done 11/17 vs prerenal sec to hyperglycemia on lasix. patient clinically looks euvolemic, FEurea <35  Continue to hold lasix for now.   Monitor renal function.

## 2017-11-20 NOTE — PROGRESS NOTE ADULT - ASSESSMENT
71 year old Faroese speaking female with HTN, CKD, Hyperlipidemia, DM-II, known CAD with 3V CABG (LIMA to LAD, SVG to OM, SVG to PDA) at [facility] 2014 with recent cath showing patent LIMA-LAD, closed SVG grafts, and a severe stenosis in the ostial Ramus with a plan to perform a staged PCI to RI
71 year old Persian speaking female with HTN, CKD, Hyperlipidemia, DM-II, known CAD with 3V CABG (LIMA to LAD, SVG to OM, SVG to PDA) at [facility] 2014 with recent cath showing patent LIMA-LAD, closed SVG grafts, and a severe stenosis in the ostial Ramus with a plan to perform a staged PCI to RI

## 2017-11-24 ENCOUNTER — INPATIENT (INPATIENT)
Facility: HOSPITAL | Age: 72
LOS: 16 days | Discharge: HOME | End: 2017-12-11
Attending: INTERNAL MEDICINE | Admitting: INTERNAL MEDICINE
Payer: MEDICAID

## 2017-11-24 VITALS
SYSTOLIC BLOOD PRESSURE: 174 MMHG | RESPIRATION RATE: 30 BRPM | HEART RATE: 113 BPM | DIASTOLIC BLOOD PRESSURE: 103 MMHG | OXYGEN SATURATION: 97 %

## 2017-11-24 DIAGNOSIS — Z95.1 PRESENCE OF AORTOCORONARY BYPASS GRAFT: Chronic | ICD-10-CM

## 2017-11-24 NOTE — ED ADULT TRIAGE NOTE - CHIEF COMPLAINT QUOTE
Arrives by EMS from home for difficulty breathing for 3 days getting worse. h/o chf. as per ems her o2 sat was in the 8os when they arrived. Placed on bipap. 2 baby asa given and 2 ntg nasal sprays given.

## 2017-11-25 DIAGNOSIS — E03.9 HYPOTHYROIDISM, UNSPECIFIED: ICD-10-CM

## 2017-11-25 DIAGNOSIS — I21.4 NON-ST ELEVATION (NSTEMI) MYOCARDIAL INFARCTION: ICD-10-CM

## 2017-11-25 DIAGNOSIS — I50.1 LEFT VENTRICULAR FAILURE, UNSPECIFIED: ICD-10-CM

## 2017-11-25 DIAGNOSIS — J18.9 PNEUMONIA, UNSPECIFIED ORGANISM: ICD-10-CM

## 2017-11-25 DIAGNOSIS — E11.9 TYPE 2 DIABETES MELLITUS WITHOUT COMPLICATIONS: ICD-10-CM

## 2017-11-25 DIAGNOSIS — I25.10 ATHEROSCLEROTIC HEART DISEASE OF NATIVE CORONARY ARTERY WITHOUT ANGINA PECTORIS: ICD-10-CM

## 2017-11-25 DIAGNOSIS — E78.5 HYPERLIPIDEMIA, UNSPECIFIED: ICD-10-CM

## 2017-11-25 DIAGNOSIS — E11.65 TYPE 2 DIABETES MELLITUS WITH HYPERGLYCEMIA: ICD-10-CM

## 2017-11-25 LAB
ALBUMIN SERPL ELPH-MCNC: 3.2 G/DL — LOW (ref 3.3–5)
ALBUMIN SERPL ELPH-MCNC: 3.8 G/DL — SIGNIFICANT CHANGE UP (ref 3.3–5)
ALP SERPL-CCNC: 68 U/L — SIGNIFICANT CHANGE UP (ref 40–120)
ALP SERPL-CCNC: 78 U/L — SIGNIFICANT CHANGE UP (ref 40–120)
ALT FLD-CCNC: 38 U/L — HIGH (ref 4–33)
ALT FLD-CCNC: 45 U/L — HIGH (ref 4–33)
ANISOCYTOSIS BLD QL: SLIGHT — SIGNIFICANT CHANGE UP
APPEARANCE UR: CLEAR — SIGNIFICANT CHANGE UP
APTT BLD: 139.2 SEC — CRITICAL HIGH (ref 27.5–37.4)
APTT BLD: 33.9 SEC — SIGNIFICANT CHANGE UP (ref 27.5–37.4)
APTT BLD: 40.4 SEC — HIGH (ref 27.5–37.4)
APTT BLD: 55.9 SEC — HIGH (ref 27.5–37.4)
AST SERPL-CCNC: 87 U/L — HIGH (ref 4–32)
AST SERPL-CCNC: 94 U/L — HIGH (ref 4–32)
B PERT DNA SPEC QL NAA+PROBE: SIGNIFICANT CHANGE UP
BACTERIA # UR AUTO: SIGNIFICANT CHANGE UP
BASE EXCESS BLDV CALC-SCNC: -7 MMOL/L — SIGNIFICANT CHANGE UP
BASE EXCESS BLDV CALC-SCNC: -9 MMOL/L — SIGNIFICANT CHANGE UP
BASOPHILS # BLD AUTO: 0.04 K/UL — SIGNIFICANT CHANGE UP (ref 0–0.2)
BASOPHILS # BLD AUTO: 0.13 K/UL — SIGNIFICANT CHANGE UP (ref 0–0.2)
BASOPHILS NFR BLD AUTO: 0.2 % — SIGNIFICANT CHANGE UP (ref 0–2)
BASOPHILS NFR BLD AUTO: 0.6 % — SIGNIFICANT CHANGE UP (ref 0–2)
BASOPHILS NFR SPEC: 0 % — SIGNIFICANT CHANGE UP (ref 0–2)
BILIRUB SERPL-MCNC: 0.3 MG/DL — SIGNIFICANT CHANGE UP (ref 0.2–1.2)
BILIRUB SERPL-MCNC: 0.3 MG/DL — SIGNIFICANT CHANGE UP (ref 0.2–1.2)
BILIRUB UR-MCNC: NEGATIVE — SIGNIFICANT CHANGE UP
BLOOD GAS VENOUS - CREATININE: 2.11 MG/DL — HIGH (ref 0.5–1.3)
BLOOD GAS VENOUS - CREATININE: 2.14 MG/DL — HIGH (ref 0.5–1.3)
BLOOD UR QL VISUAL: HIGH
BUN SERPL-MCNC: 43 MG/DL — HIGH (ref 7–23)
BUN SERPL-MCNC: 45 MG/DL — HIGH (ref 7–23)
BUN SERPL-MCNC: 46 MG/DL — HIGH (ref 7–23)
BUN SERPL-MCNC: 48 MG/DL — HIGH (ref 7–23)
C PNEUM DNA SPEC QL NAA+PROBE: NOT DETECTED — SIGNIFICANT CHANGE UP
CALCIUM SERPL-MCNC: 7.9 MG/DL — LOW (ref 8.4–10.5)
CALCIUM SERPL-MCNC: 8.1 MG/DL — LOW (ref 8.4–10.5)
CALCIUM SERPL-MCNC: 8.3 MG/DL — LOW (ref 8.4–10.5)
CALCIUM SERPL-MCNC: 8.3 MG/DL — LOW (ref 8.4–10.5)
CHLORIDE BLDV-SCNC: 105 MMOL/L — SIGNIFICANT CHANGE UP (ref 96–108)
CHLORIDE BLDV-SCNC: 105 MMOL/L — SIGNIFICANT CHANGE UP (ref 96–108)
CHLORIDE SERPL-SCNC: 100 MMOL/L — SIGNIFICANT CHANGE UP (ref 98–107)
CHLORIDE SERPL-SCNC: 100 MMOL/L — SIGNIFICANT CHANGE UP (ref 98–107)
CHLORIDE SERPL-SCNC: 101 MMOL/L — SIGNIFICANT CHANGE UP (ref 98–107)
CHLORIDE SERPL-SCNC: 101 MMOL/L — SIGNIFICANT CHANGE UP (ref 98–107)
CHOLEST SERPL-MCNC: 136 MG/DL — SIGNIFICANT CHANGE UP (ref 120–199)
CK MB BLD-MCNC: 14.54 NG/ML — HIGH (ref 1–4.7)
CK MB BLD-MCNC: 17.89 NG/ML — HIGH (ref 1–4.7)
CK MB BLD-MCNC: 23.67 NG/ML — HIGH (ref 1–4.7)
CK MB BLD-MCNC: 25.04 NG/ML — HIGH (ref 1–4.7)
CK MB BLD-MCNC: 5.6 — HIGH (ref 0–2.5)
CK SERPL-CCNC: 391 U/L — HIGH (ref 25–170)
CK SERPL-CCNC: 420 U/L — HIGH (ref 25–170)
CK SERPL-CCNC: 439 U/L — HIGH (ref 25–170)
CK SERPL-CCNC: 447 U/L — HIGH (ref 25–170)
CO2 SERPL-SCNC: 15 MMOL/L — LOW (ref 22–31)
CO2 SERPL-SCNC: 15 MMOL/L — LOW (ref 22–31)
CO2 SERPL-SCNC: 18 MMOL/L — LOW (ref 22–31)
CO2 SERPL-SCNC: 19 MMOL/L — LOW (ref 22–31)
COLOR SPEC: SIGNIFICANT CHANGE UP
CREAT SERPL-MCNC: 1.89 MG/DL — HIGH (ref 0.5–1.3)
CREAT SERPL-MCNC: 1.99 MG/DL — HIGH (ref 0.5–1.3)
CREAT SERPL-MCNC: 2.16 MG/DL — HIGH (ref 0.5–1.3)
CREAT SERPL-MCNC: 2.28 MG/DL — HIGH (ref 0.5–1.3)
EOSINOPHIL # BLD AUTO: 0 K/UL — SIGNIFICANT CHANGE UP (ref 0–0.5)
EOSINOPHIL # BLD AUTO: 0.2 K/UL — SIGNIFICANT CHANGE UP (ref 0–0.5)
EOSINOPHIL NFR BLD AUTO: 0 % — SIGNIFICANT CHANGE UP (ref 0–6)
EOSINOPHIL NFR BLD AUTO: 1 % — SIGNIFICANT CHANGE UP (ref 0–6)
EOSINOPHIL NFR FLD: 0 % — SIGNIFICANT CHANGE UP (ref 0–6)
FLUAV H1 2009 PAND RNA SPEC QL NAA+PROBE: NOT DETECTED — SIGNIFICANT CHANGE UP
FLUAV H1 RNA SPEC QL NAA+PROBE: NOT DETECTED — SIGNIFICANT CHANGE UP
FLUAV H3 RNA SPEC QL NAA+PROBE: NOT DETECTED — SIGNIFICANT CHANGE UP
FLUAV SUBTYP SPEC NAA+PROBE: SIGNIFICANT CHANGE UP
FLUBV RNA SPEC QL NAA+PROBE: NOT DETECTED — SIGNIFICANT CHANGE UP
GAS PNL BLDV: 134 MMOL/L — LOW (ref 136–146)
GAS PNL BLDV: 137 MMOL/L — SIGNIFICANT CHANGE UP (ref 136–146)
GLUCOSE BLDC GLUCOMTR-MCNC: 162 MG/DL — HIGH (ref 70–99)
GLUCOSE BLDC GLUCOMTR-MCNC: 198 MG/DL — HIGH (ref 70–99)
GLUCOSE BLDC GLUCOMTR-MCNC: 210 MG/DL — HIGH (ref 70–99)
GLUCOSE BLDC GLUCOMTR-MCNC: 230 MG/DL — HIGH (ref 70–99)
GLUCOSE BLDC GLUCOMTR-MCNC: 236 MG/DL — HIGH (ref 70–99)
GLUCOSE BLDC GLUCOMTR-MCNC: 245 MG/DL — HIGH (ref 70–99)
GLUCOSE BLDC GLUCOMTR-MCNC: 290 MG/DL — HIGH (ref 70–99)
GLUCOSE BLDC GLUCOMTR-MCNC: 315 MG/DL — HIGH (ref 70–99)
GLUCOSE BLDC GLUCOMTR-MCNC: 316 MG/DL — HIGH (ref 70–99)
GLUCOSE BLDC GLUCOMTR-MCNC: 357 MG/DL — HIGH (ref 70–99)
GLUCOSE BLDV-MCNC: 393 — HIGH (ref 70–99)
GLUCOSE BLDV-MCNC: 470 — CRITICAL HIGH (ref 70–99)
GLUCOSE SERPL-MCNC: 251 MG/DL — HIGH (ref 70–99)
GLUCOSE SERPL-MCNC: 299 MG/DL — HIGH (ref 70–99)
GLUCOSE SERPL-MCNC: 406 MG/DL — HIGH (ref 70–99)
GLUCOSE SERPL-MCNC: 407 MG/DL — HIGH (ref 70–99)
GLUCOSE UR-MCNC: >1000 — SIGNIFICANT CHANGE UP
HADV DNA SPEC QL NAA+PROBE: NOT DETECTED — SIGNIFICANT CHANGE UP
HBA1C BLD-MCNC: 9.1 % — HIGH (ref 4–5.6)
HCO3 BLDV-SCNC: 15 MMOL/L — LOW (ref 20–27)
HCO3 BLDV-SCNC: 19 MMOL/L — LOW (ref 20–27)
HCOV 229E RNA SPEC QL NAA+PROBE: NOT DETECTED — SIGNIFICANT CHANGE UP
HCOV HKU1 RNA SPEC QL NAA+PROBE: NOT DETECTED — SIGNIFICANT CHANGE UP
HCOV NL63 RNA SPEC QL NAA+PROBE: NOT DETECTED — SIGNIFICANT CHANGE UP
HCOV OC43 RNA SPEC QL NAA+PROBE: NOT DETECTED — SIGNIFICANT CHANGE UP
HCT VFR BLD CALC: 33.2 % — LOW (ref 34.5–45)
HCT VFR BLD CALC: 33.4 % — LOW (ref 34.5–45)
HCT VFR BLD CALC: 48.2 % — HIGH (ref 34.5–45)
HCT VFR BLDV CALC: 34.3 % — LOW (ref 34.5–45)
HCT VFR BLDV CALC: 40.2 % — SIGNIFICANT CHANGE UP (ref 34.5–45)
HDLC SERPL-MCNC: 37 MG/DL — LOW (ref 45–65)
HGB BLD-MCNC: 10.8 G/DL — LOW (ref 11.5–15.5)
HGB BLD-MCNC: 11.1 G/DL — LOW (ref 11.5–15.5)
HGB BLD-MCNC: 14.7 G/DL — SIGNIFICANT CHANGE UP (ref 11.5–15.5)
HGB BLDV-MCNC: 11.1 G/DL — LOW (ref 11.5–15.5)
HGB BLDV-MCNC: 13.1 G/DL — SIGNIFICANT CHANGE UP (ref 11.5–15.5)
HMPV RNA SPEC QL NAA+PROBE: NOT DETECTED — SIGNIFICANT CHANGE UP
HPIV1 RNA SPEC QL NAA+PROBE: NOT DETECTED — SIGNIFICANT CHANGE UP
HPIV2 RNA SPEC QL NAA+PROBE: NOT DETECTED — SIGNIFICANT CHANGE UP
HPIV3 RNA SPEC QL NAA+PROBE: NOT DETECTED — SIGNIFICANT CHANGE UP
HPIV4 RNA SPEC QL NAA+PROBE: NOT DETECTED — SIGNIFICANT CHANGE UP
HYALINE CASTS # UR AUTO: SIGNIFICANT CHANGE UP (ref 0–?)
IMM GRANULOCYTES # BLD AUTO: 0.14 # — SIGNIFICANT CHANGE UP
IMM GRANULOCYTES # BLD AUTO: 0.2 # — SIGNIFICANT CHANGE UP
IMM GRANULOCYTES NFR BLD AUTO: 0.9 % — SIGNIFICANT CHANGE UP (ref 0–1.5)
IMM GRANULOCYTES NFR BLD AUTO: 1 % — SIGNIFICANT CHANGE UP (ref 0–1.5)
INR BLD: 1.01 — SIGNIFICANT CHANGE UP (ref 0.88–1.17)
KETONES UR-MCNC: NEGATIVE — SIGNIFICANT CHANGE UP
LACTATE BLDV-MCNC: 1.8 MMOL/L — SIGNIFICANT CHANGE UP (ref 0.5–2)
LACTATE BLDV-MCNC: 4.8 MMOL/L — CRITICAL HIGH (ref 0.5–2)
LEUKOCYTE ESTERASE UR-ACNC: NEGATIVE — SIGNIFICANT CHANGE UP
LIPID PNL WITH DIRECT LDL SERPL: 80 MG/DL — SIGNIFICANT CHANGE UP
LYMPHOCYTES # BLD AUTO: 15.7 % — SIGNIFICANT CHANGE UP (ref 13–44)
LYMPHOCYTES # BLD AUTO: 2.55 K/UL — SIGNIFICANT CHANGE UP (ref 1–3.3)
LYMPHOCYTES # BLD AUTO: 38.6 % — SIGNIFICANT CHANGE UP (ref 13–44)
LYMPHOCYTES # BLD AUTO: 8.1 K/UL — HIGH (ref 1–3.3)
LYMPHOCYTES NFR SPEC AUTO: 39 % — SIGNIFICANT CHANGE UP (ref 13–44)
M PNEUMO DNA SPEC QL NAA+PROBE: NOT DETECTED — SIGNIFICANT CHANGE UP
MACROCYTES BLD QL: SLIGHT — SIGNIFICANT CHANGE UP
MAGNESIUM SERPL-MCNC: 2 MG/DL — SIGNIFICANT CHANGE UP (ref 1.6–2.6)
MANUAL SMEAR VERIFICATION: SIGNIFICANT CHANGE UP
MCHC RBC-ENTMCNC: 28.7 PG — SIGNIFICANT CHANGE UP (ref 27–34)
MCHC RBC-ENTMCNC: 28.8 PG — SIGNIFICANT CHANGE UP (ref 27–34)
MCHC RBC-ENTMCNC: 29.9 PG — SIGNIFICANT CHANGE UP (ref 27–34)
MCHC RBC-ENTMCNC: 30.5 % — LOW (ref 32–36)
MCHC RBC-ENTMCNC: 32.3 % — SIGNIFICANT CHANGE UP (ref 32–36)
MCHC RBC-ENTMCNC: 33.4 % — SIGNIFICANT CHANGE UP (ref 32–36)
MCV RBC AUTO: 89.1 FL — SIGNIFICANT CHANGE UP (ref 80–100)
MCV RBC AUTO: 89.5 FL — SIGNIFICANT CHANGE UP (ref 80–100)
MCV RBC AUTO: 94.1 FL — SIGNIFICANT CHANGE UP (ref 80–100)
MONOCYTES # BLD AUTO: 1.22 K/UL — HIGH (ref 0–0.9)
MONOCYTES # BLD AUTO: 1.57 K/UL — HIGH (ref 0–0.9)
MONOCYTES NFR BLD AUTO: 7.5 % — SIGNIFICANT CHANGE UP (ref 2–14)
MONOCYTES NFR BLD AUTO: 7.5 % — SIGNIFICANT CHANGE UP (ref 2–14)
MONOCYTES NFR BLD: 9 % — SIGNIFICANT CHANGE UP (ref 2–9)
MUCOUS THREADS # UR AUTO: SIGNIFICANT CHANGE UP
NEUTROPHIL AB SER-ACNC: 52 % — SIGNIFICANT CHANGE UP (ref 43–77)
NEUTROPHILS # BLD AUTO: 10.8 K/UL — HIGH (ref 1.8–7.4)
NEUTROPHILS # BLD AUTO: 12.25 K/UL — HIGH (ref 1.8–7.4)
NEUTROPHILS NFR BLD AUTO: 51.3 % — SIGNIFICANT CHANGE UP (ref 43–77)
NEUTROPHILS NFR BLD AUTO: 75.7 % — SIGNIFICANT CHANGE UP (ref 43–77)
NITRITE UR-MCNC: NEGATIVE — SIGNIFICANT CHANGE UP
NON-SQ EPI CELLS # UR AUTO: <1 — SIGNIFICANT CHANGE UP
NRBC # FLD: 0 — SIGNIFICANT CHANGE UP
NT-PROBNP SERPL-SCNC: 4114 PG/ML — SIGNIFICANT CHANGE UP
PCO2 BLDV: 36 MMHG — LOW (ref 41–51)
PCO2 BLDV: 54 MMHG — HIGH (ref 41–51)
PH BLDV: 7.16 PH — CRITICAL LOW (ref 7.32–7.43)
PH BLDV: 7.32 PH — SIGNIFICANT CHANGE UP (ref 7.32–7.43)
PH UR: 6 — SIGNIFICANT CHANGE UP (ref 4.6–8)
PHOSPHATE SERPL-MCNC: 2.7 MG/DL — SIGNIFICANT CHANGE UP (ref 2.5–4.5)
PLATELET # BLD AUTO: 298 K/UL — SIGNIFICANT CHANGE UP (ref 150–400)
PLATELET # BLD AUTO: 307 K/UL — SIGNIFICANT CHANGE UP (ref 150–400)
PLATELET # BLD AUTO: 358 K/UL — SIGNIFICANT CHANGE UP (ref 150–400)
PLATELET COUNT - ESTIMATE: NORMAL — SIGNIFICANT CHANGE UP
PMV BLD: 9.4 FL — SIGNIFICANT CHANGE UP (ref 7–13)
PMV BLD: 9.6 FL — SIGNIFICANT CHANGE UP (ref 7–13)
PMV BLD: 9.9 FL — SIGNIFICANT CHANGE UP (ref 7–13)
PO2 BLDV: 58 MMHG — HIGH (ref 35–40)
PO2 BLDV: < 24 MMHG — LOW (ref 35–40)
POTASSIUM BLDV-SCNC: 4.4 MMOL/L — SIGNIFICANT CHANGE UP (ref 3.4–4.5)
POTASSIUM BLDV-SCNC: 4.5 MMOL/L — SIGNIFICANT CHANGE UP (ref 3.4–4.5)
POTASSIUM SERPL-MCNC: 3.3 MMOL/L — LOW (ref 3.5–5.3)
POTASSIUM SERPL-MCNC: 4.2 MMOL/L — SIGNIFICANT CHANGE UP (ref 3.5–5.3)
POTASSIUM SERPL-MCNC: 4.5 MMOL/L — SIGNIFICANT CHANGE UP (ref 3.5–5.3)
POTASSIUM SERPL-MCNC: 4.8 MMOL/L — SIGNIFICANT CHANGE UP (ref 3.5–5.3)
POTASSIUM SERPL-SCNC: 3.3 MMOL/L — LOW (ref 3.5–5.3)
POTASSIUM SERPL-SCNC: 4.2 MMOL/L — SIGNIFICANT CHANGE UP (ref 3.5–5.3)
POTASSIUM SERPL-SCNC: 4.5 MMOL/L — SIGNIFICANT CHANGE UP (ref 3.5–5.3)
POTASSIUM SERPL-SCNC: 4.8 MMOL/L — SIGNIFICANT CHANGE UP (ref 3.5–5.3)
PROT SERPL-MCNC: 7.2 G/DL — SIGNIFICANT CHANGE UP (ref 6–8.3)
PROT SERPL-MCNC: 8.1 G/DL — SIGNIFICANT CHANGE UP (ref 6–8.3)
PROT UR-MCNC: 150 — HIGH
PROTHROM AB SERPL-ACNC: 11.3 SEC — SIGNIFICANT CHANGE UP (ref 9.8–13.1)
RBC # BLD: 3.71 M/UL — LOW (ref 3.8–5.2)
RBC # BLD: 3.75 M/UL — LOW (ref 3.8–5.2)
RBC # BLD: 5.12 M/UL — SIGNIFICANT CHANGE UP (ref 3.8–5.2)
RBC # FLD: 14.4 % — SIGNIFICANT CHANGE UP (ref 10.3–14.5)
RBC # FLD: 14.6 % — HIGH (ref 10.3–14.5)
RBC # FLD: 14.6 % — HIGH (ref 10.3–14.5)
RBC CASTS # UR COMP ASSIST: SIGNIFICANT CHANGE UP (ref 0–?)
RSV RNA SPEC QL NAA+PROBE: NOT DETECTED — SIGNIFICANT CHANGE UP
RV+EV RNA SPEC QL NAA+PROBE: NOT DETECTED — SIGNIFICANT CHANGE UP
SAO2 % BLDV: 22.1 % — LOW (ref 60–85)
SAO2 % BLDV: 85.2 % — HIGH (ref 60–85)
SODIUM SERPL-SCNC: 133 MMOL/L — LOW (ref 135–145)
SODIUM SERPL-SCNC: 135 MMOL/L — SIGNIFICANT CHANGE UP (ref 135–145)
SODIUM SERPL-SCNC: 137 MMOL/L — SIGNIFICANT CHANGE UP (ref 135–145)
SODIUM SERPL-SCNC: 140 MMOL/L — SIGNIFICANT CHANGE UP (ref 135–145)
SP GR SPEC: 1.02 — SIGNIFICANT CHANGE UP (ref 1–1.03)
SQUAMOUS # UR AUTO: SIGNIFICANT CHANGE UP
TRIGL SERPL-MCNC: 144 MG/DL — SIGNIFICANT CHANGE UP (ref 10–149)
TROPONIN T SERPL-MCNC: 4.26 NG/ML — HIGH (ref 0–0.06)
TROPONIN T SERPL-MCNC: 4.35 NG/ML — HIGH (ref 0–0.06)
TROPONIN T SERPL-MCNC: 5.17 NG/ML — HIGH (ref 0–0.06)
TROPONIN T SERPL-MCNC: 6.17 NG/ML — HIGH (ref 0–0.06)
TSH SERPL-MCNC: 0.79 UIU/ML — SIGNIFICANT CHANGE UP (ref 0.27–4.2)
UROBILINOGEN FLD QL: NORMAL E.U. — SIGNIFICANT CHANGE UP (ref 0.1–0.2)
WBC # BLD: 16.2 K/UL — HIGH (ref 3.8–10.5)
WBC # BLD: 16.27 K/UL — HIGH (ref 3.8–10.5)
WBC # BLD: 21 K/UL — HIGH (ref 3.8–10.5)
WBC # FLD AUTO: 16.2 K/UL — HIGH (ref 3.8–10.5)
WBC # FLD AUTO: 16.27 K/UL — HIGH (ref 3.8–10.5)
WBC # FLD AUTO: 21 K/UL — HIGH (ref 3.8–10.5)
WBC UR QL: SIGNIFICANT CHANGE UP (ref 0–?)

## 2017-11-25 PROCEDURE — 71010: CPT | Mod: 26

## 2017-11-25 PROCEDURE — 93306 TTE W/DOPPLER COMPLETE: CPT | Mod: 26

## 2017-11-25 PROCEDURE — 93010 ELECTROCARDIOGRAM REPORT: CPT

## 2017-11-25 PROCEDURE — 99222 1ST HOSP IP/OBS MODERATE 55: CPT | Mod: GC

## 2017-11-25 RX ORDER — GLUCAGON INJECTION, SOLUTION 0.5 MG/.1ML
1 INJECTION, SOLUTION SUBCUTANEOUS ONCE
Qty: 0 | Refills: 0 | Status: DISCONTINUED | OUTPATIENT
Start: 2017-11-25 | End: 2017-11-25

## 2017-11-25 RX ORDER — INSULIN LISPRO 100/ML
VIAL (ML) SUBCUTANEOUS
Qty: 0 | Refills: 0 | Status: DISCONTINUED | OUTPATIENT
Start: 2017-11-25 | End: 2017-11-25

## 2017-11-25 RX ORDER — SODIUM CHLORIDE 9 MG/ML
1000 INJECTION, SOLUTION INTRAVENOUS
Qty: 0 | Refills: 0 | Status: DISCONTINUED | OUTPATIENT
Start: 2017-11-25 | End: 2017-12-11

## 2017-11-25 RX ORDER — DEXTROSE 50 % IN WATER 50 %
1 SYRINGE (ML) INTRAVENOUS ONCE
Qty: 0 | Refills: 0 | Status: DISCONTINUED | OUTPATIENT
Start: 2017-11-25 | End: 2017-11-25

## 2017-11-25 RX ORDER — DEXTROSE 50 % IN WATER 50 %
25 SYRINGE (ML) INTRAVENOUS ONCE
Qty: 0 | Refills: 0 | Status: DISCONTINUED | OUTPATIENT
Start: 2017-11-25 | End: 2017-11-25

## 2017-11-25 RX ORDER — SODIUM CHLORIDE 9 MG/ML
1000 INJECTION, SOLUTION INTRAVENOUS
Qty: 0 | Refills: 0 | Status: DISCONTINUED | OUTPATIENT
Start: 2017-11-25 | End: 2017-11-25

## 2017-11-25 RX ORDER — CLOPIDOGREL BISULFATE 75 MG/1
600 TABLET, FILM COATED ORAL ONCE
Qty: 0 | Refills: 0 | Status: COMPLETED | OUTPATIENT
Start: 2017-11-25 | End: 2017-11-25

## 2017-11-25 RX ORDER — INSULIN GLARGINE 100 [IU]/ML
40 INJECTION, SOLUTION SUBCUTANEOUS AT BEDTIME
Qty: 0 | Refills: 0 | Status: DISCONTINUED | OUTPATIENT
Start: 2017-11-25 | End: 2017-11-25

## 2017-11-25 RX ORDER — AZITHROMYCIN 500 MG/1
500 TABLET, FILM COATED ORAL ONCE
Qty: 0 | Refills: 0 | Status: COMPLETED | OUTPATIENT
Start: 2017-11-25 | End: 2017-11-25

## 2017-11-25 RX ORDER — INSULIN HUMAN 100 [IU]/ML
10 INJECTION, SOLUTION SUBCUTANEOUS
Qty: 100 | Refills: 0 | Status: DISCONTINUED | OUTPATIENT
Start: 2017-11-25 | End: 2017-11-25

## 2017-11-25 RX ORDER — INSULIN LISPRO 100/ML
20 VIAL (ML) SUBCUTANEOUS
Qty: 0 | Refills: 0 | Status: DISCONTINUED | OUTPATIENT
Start: 2017-11-25 | End: 2017-11-25

## 2017-11-25 RX ORDER — DEXTROSE 50 % IN WATER 50 %
12.5 SYRINGE (ML) INTRAVENOUS ONCE
Qty: 0 | Refills: 0 | Status: DISCONTINUED | OUTPATIENT
Start: 2017-11-25 | End: 2017-11-25

## 2017-11-25 RX ORDER — ASPIRIN/CALCIUM CARB/MAGNESIUM 324 MG
81 TABLET ORAL DAILY
Qty: 0 | Refills: 0 | Status: DISCONTINUED | OUTPATIENT
Start: 2017-11-25 | End: 2017-12-11

## 2017-11-25 RX ORDER — HEPARIN SODIUM 5000 [USP'U]/ML
3500 INJECTION INTRAVENOUS; SUBCUTANEOUS ONCE
Qty: 0 | Refills: 0 | Status: COMPLETED | OUTPATIENT
Start: 2017-11-25 | End: 2017-11-25

## 2017-11-25 RX ORDER — ASPIRIN/CALCIUM CARB/MAGNESIUM 324 MG
162 TABLET ORAL ONCE
Qty: 0 | Refills: 0 | Status: COMPLETED | OUTPATIENT
Start: 2017-11-25 | End: 2017-11-25

## 2017-11-25 RX ORDER — INSULIN LISPRO 100/ML
6 VIAL (ML) SUBCUTANEOUS ONCE
Qty: 0 | Refills: 0 | Status: COMPLETED | OUTPATIENT
Start: 2017-11-25 | End: 2017-11-25

## 2017-11-25 RX ORDER — FUROSEMIDE 40 MG
5 TABLET ORAL
Qty: 500 | Refills: 0 | Status: DISCONTINUED | OUTPATIENT
Start: 2017-11-25 | End: 2017-11-26

## 2017-11-25 RX ORDER — INSULIN LISPRO 100/ML
VIAL (ML) SUBCUTANEOUS AT BEDTIME
Qty: 0 | Refills: 0 | Status: DISCONTINUED | OUTPATIENT
Start: 2017-11-25 | End: 2017-12-11

## 2017-11-25 RX ORDER — PIPERACILLIN AND TAZOBACTAM 4; .5 G/20ML; G/20ML
3.38 INJECTION, POWDER, LYOPHILIZED, FOR SOLUTION INTRAVENOUS EVERY 12 HOURS
Qty: 0 | Refills: 0 | Status: DISCONTINUED | OUTPATIENT
Start: 2017-11-25 | End: 2017-11-25

## 2017-11-25 RX ORDER — HEPARIN SODIUM 5000 [USP'U]/ML
700 INJECTION INTRAVENOUS; SUBCUTANEOUS
Qty: 25000 | Refills: 0 | Status: DISCONTINUED | OUTPATIENT
Start: 2017-11-25 | End: 2017-11-27

## 2017-11-25 RX ORDER — LEVOTHYROXINE SODIUM 125 MCG
50 TABLET ORAL DAILY
Qty: 0 | Refills: 0 | Status: DISCONTINUED | OUTPATIENT
Start: 2017-11-25 | End: 2017-12-11

## 2017-11-25 RX ORDER — DEXTROSE 50 % IN WATER 50 %
25 SYRINGE (ML) INTRAVENOUS ONCE
Qty: 0 | Refills: 0 | Status: DISCONTINUED | OUTPATIENT
Start: 2017-11-25 | End: 2017-12-11

## 2017-11-25 RX ORDER — VANCOMYCIN HCL 1 G
1000 VIAL (EA) INTRAVENOUS ONCE
Qty: 0 | Refills: 0 | Status: COMPLETED | OUTPATIENT
Start: 2017-11-25 | End: 2017-11-25

## 2017-11-25 RX ORDER — CLOPIDOGREL BISULFATE 75 MG/1
75 TABLET, FILM COATED ORAL DAILY
Qty: 0 | Refills: 0 | Status: DISCONTINUED | OUTPATIENT
Start: 2017-11-25 | End: 2017-12-05

## 2017-11-25 RX ORDER — HEPARIN SODIUM 5000 [USP'U]/ML
500 INJECTION INTRAVENOUS; SUBCUTANEOUS
Qty: 25000 | Refills: 0 | Status: DISCONTINUED | OUTPATIENT
Start: 2017-11-25 | End: 2017-11-25

## 2017-11-25 RX ORDER — CEFEPIME 1 G/1
1000 INJECTION, POWDER, FOR SOLUTION INTRAMUSCULAR; INTRAVENOUS DAILY
Qty: 0 | Refills: 0 | Status: DISCONTINUED | OUTPATIENT
Start: 2017-11-25 | End: 2017-11-26

## 2017-11-25 RX ORDER — INSULIN LISPRO 100/ML
12 VIAL (ML) SUBCUTANEOUS
Qty: 0 | Refills: 0 | Status: DISCONTINUED | OUTPATIENT
Start: 2017-11-25 | End: 2017-11-25

## 2017-11-25 RX ORDER — POTASSIUM CHLORIDE 20 MEQ
40 PACKET (EA) ORAL EVERY 4 HOURS
Qty: 0 | Refills: 0 | Status: COMPLETED | OUTPATIENT
Start: 2017-11-25 | End: 2017-11-25

## 2017-11-25 RX ORDER — INSULIN GLARGINE 100 [IU]/ML
60 INJECTION, SOLUTION SUBCUTANEOUS AT BEDTIME
Qty: 0 | Refills: 0 | Status: DISCONTINUED | OUTPATIENT
Start: 2017-11-25 | End: 2017-11-26

## 2017-11-25 RX ORDER — KETAMINE HYDROCHLORIDE 100 MG/ML
20 INJECTION INTRAMUSCULAR; INTRAVENOUS ONCE
Qty: 0 | Refills: 0 | Status: DISCONTINUED | OUTPATIENT
Start: 2017-11-25 | End: 2017-11-25

## 2017-11-25 RX ORDER — INSULIN LISPRO 100/ML
VIAL (ML) SUBCUTANEOUS
Qty: 0 | Refills: 0 | Status: DISCONTINUED | OUTPATIENT
Start: 2017-11-25 | End: 2017-12-11

## 2017-11-25 RX ORDER — CHLORHEXIDINE GLUCONATE 213 G/1000ML
1 SOLUTION TOPICAL DAILY
Qty: 0 | Refills: 0 | Status: DISCONTINUED | OUTPATIENT
Start: 2017-11-25 | End: 2017-11-29

## 2017-11-25 RX ORDER — DEXTROSE 50 % IN WATER 50 %
12.5 SYRINGE (ML) INTRAVENOUS ONCE
Qty: 0 | Refills: 0 | Status: DISCONTINUED | OUTPATIENT
Start: 2017-11-25 | End: 2017-12-11

## 2017-11-25 RX ORDER — GLUCAGON INJECTION, SOLUTION 0.5 MG/.1ML
1 INJECTION, SOLUTION SUBCUTANEOUS ONCE
Qty: 0 | Refills: 0 | Status: DISCONTINUED | OUTPATIENT
Start: 2017-11-25 | End: 2017-12-11

## 2017-11-25 RX ORDER — INSULIN LISPRO 100/ML
VIAL (ML) SUBCUTANEOUS AT BEDTIME
Qty: 0 | Refills: 0 | Status: DISCONTINUED | OUTPATIENT
Start: 2017-11-25 | End: 2017-11-25

## 2017-11-25 RX ORDER — POTASSIUM CHLORIDE 20 MEQ
10 PACKET (EA) ORAL
Qty: 0 | Refills: 0 | Status: COMPLETED | OUTPATIENT
Start: 2017-11-25 | End: 2017-11-25

## 2017-11-25 RX ORDER — CEFEPIME 1 G/1
2000 INJECTION, POWDER, FOR SOLUTION INTRAMUSCULAR; INTRAVENOUS ONCE
Qty: 0 | Refills: 0 | Status: COMPLETED | OUTPATIENT
Start: 2017-11-25 | End: 2017-11-25

## 2017-11-25 RX ORDER — INSULIN GLARGINE 100 [IU]/ML
60 INJECTION, SOLUTION SUBCUTANEOUS AT BEDTIME
Qty: 0 | Refills: 0 | Status: DISCONTINUED | OUTPATIENT
Start: 2017-11-25 | End: 2017-11-25

## 2017-11-25 RX ORDER — HEPARIN SODIUM 5000 [USP'U]/ML
INJECTION INTRAVENOUS; SUBCUTANEOUS
Qty: 25000 | Refills: 0 | Status: DISCONTINUED | OUTPATIENT
Start: 2017-11-25 | End: 2017-11-25

## 2017-11-25 RX ORDER — DEXTROSE 50 % IN WATER 50 %
1 SYRINGE (ML) INTRAVENOUS ONCE
Qty: 0 | Refills: 0 | Status: DISCONTINUED | OUTPATIENT
Start: 2017-11-25 | End: 2017-12-11

## 2017-11-25 RX ORDER — INSULIN LISPRO 100/ML
20 VIAL (ML) SUBCUTANEOUS
Qty: 0 | Refills: 0 | Status: DISCONTINUED | OUTPATIENT
Start: 2017-11-25 | End: 2017-11-26

## 2017-11-25 RX ORDER — MONTELUKAST 4 MG/1
10 TABLET, CHEWABLE ORAL AT BEDTIME
Qty: 0 | Refills: 0 | Status: DISCONTINUED | OUTPATIENT
Start: 2017-11-25 | End: 2017-12-11

## 2017-11-25 RX ADMIN — Medication 100 MILLIEQUIVALENT(S): at 17:52

## 2017-11-25 RX ADMIN — HEPARIN SODIUM 500 UNIT(S)/HR: 5000 INJECTION INTRAVENOUS; SUBCUTANEOUS at 09:00

## 2017-11-25 RX ADMIN — INSULIN GLARGINE 60 UNIT(S): 100 INJECTION, SOLUTION SUBCUTANEOUS at 22:58

## 2017-11-25 RX ADMIN — Medication 100 MILLIEQUIVALENT(S): at 19:01

## 2017-11-25 RX ADMIN — Medication 40 MILLIEQUIVALENT(S): at 16:42

## 2017-11-25 RX ADMIN — Medication 100 MILLIEQUIVALENT(S): at 16:43

## 2017-11-25 RX ADMIN — INSULIN HUMAN 9 UNIT(S)/HR: 100 INJECTION, SOLUTION SUBCUTANEOUS at 14:38

## 2017-11-25 RX ADMIN — HEPARIN SODIUM 500 UNIT(S)/HR: 5000 INJECTION INTRAVENOUS; SUBCUTANEOUS at 20:21

## 2017-11-25 RX ADMIN — INSULIN HUMAN 9 UNIT(S)/HR: 100 INJECTION, SOLUTION SUBCUTANEOUS at 10:41

## 2017-11-25 RX ADMIN — CLOPIDOGREL BISULFATE 600 MILLIGRAM(S): 75 TABLET, FILM COATED ORAL at 03:08

## 2017-11-25 RX ADMIN — Medication 2.5 MG/HR: at 07:00

## 2017-11-25 RX ADMIN — Medication 250 MILLIGRAM(S): at 03:08

## 2017-11-25 RX ADMIN — Medication 81 MILLIGRAM(S): at 11:41

## 2017-11-25 RX ADMIN — Medication 162 MILLIGRAM(S): at 03:08

## 2017-11-25 RX ADMIN — HEPARIN SODIUM 700 UNIT(S)/HR: 5000 INJECTION INTRAVENOUS; SUBCUTANEOUS at 01:55

## 2017-11-25 RX ADMIN — KETAMINE HYDROCHLORIDE 20 MILLIGRAM(S): 100 INJECTION INTRAMUSCULAR; INTRAVENOUS at 00:05

## 2017-11-25 RX ADMIN — CLOPIDOGREL BISULFATE 75 MILLIGRAM(S): 75 TABLET, FILM COATED ORAL at 11:41

## 2017-11-25 RX ADMIN — INSULIN HUMAN 6 UNIT(S)/HR: 100 INJECTION, SOLUTION SUBCUTANEOUS at 09:30

## 2017-11-25 RX ADMIN — INSULIN HUMAN 10 UNIT(S)/HR: 100 INJECTION, SOLUTION SUBCUTANEOUS at 16:05

## 2017-11-25 RX ADMIN — CEFEPIME 100 MILLIGRAM(S): 1 INJECTION, POWDER, FOR SOLUTION INTRAMUSCULAR; INTRAVENOUS at 22:57

## 2017-11-25 RX ADMIN — CEFEPIME 100 MILLIGRAM(S): 1 INJECTION, POWDER, FOR SOLUTION INTRAMUSCULAR; INTRAVENOUS at 00:38

## 2017-11-25 RX ADMIN — Medication 50 MICROGRAM(S): at 09:50

## 2017-11-25 RX ADMIN — HEPARIN SODIUM 3500 UNIT(S): 5000 INJECTION INTRAVENOUS; SUBCUTANEOUS at 01:55

## 2017-11-25 RX ADMIN — Medication 2.5 MG/HR: at 20:20

## 2017-11-25 RX ADMIN — MONTELUKAST 10 MILLIGRAM(S): 4 TABLET, CHEWABLE ORAL at 22:58

## 2017-11-25 RX ADMIN — Medication 6 UNIT(S): at 09:01

## 2017-11-25 RX ADMIN — INSULIN HUMAN 8 UNIT(S)/HR: 100 INJECTION, SOLUTION SUBCUTANEOUS at 11:59

## 2017-11-25 RX ADMIN — AZITHROMYCIN 250 MILLIGRAM(S): 500 TABLET, FILM COATED ORAL at 01:55

## 2017-11-25 RX ADMIN — Medication 40 MILLIEQUIVALENT(S): at 22:58

## 2017-11-25 NOTE — H&P ADULT - PROBLEM SELECTOR PLAN 1
Pt. w/ bilalateral rales, SOB  likely 2/2 possible NSTEMI   Admit to CCU  labs to include CMP, Mag, phos, CBC, coags, A1c, pBNP, TSH, -CXR,   TTE for LV function and WMA  Strict I/Os and standing daily weights  initiate lasix gtt @5 mg/ hr.

## 2017-11-25 NOTE — ED ADULT NURSE NOTE - CHIEF COMPLAINT
The patient is a 71y Female complaining of The patient is a 71y Female complaining of SOB, difficulty breathing x3 days worsening x45 min before arrival, BIBEMS on cpap, as per EMS, pt o2sat in 80s on room air. The patient is a 71y Female complaining of SOB, difficulty breathing x3 days worsening x45 min before arrival, BIBEMS on cpap, as per EMS, pt o2sat in 80s on room air. As per daughter, pt was recently discharged on Monday after stent placement.

## 2017-11-25 NOTE — CONSULT NOTE ADULT - SUBJECTIVE AND OBJECTIVE BOX
Patient is a 71y old  Female who presents with a chief complaint of sob,chest pain (25 Nov 2017 04:50)    HPI:  71F Slovak speaking female PMH HTN, CKD, Hyperlipidemia, DM-II, CAD s/p CABG, s/p stents presenting with shortness of breath. Symptoms started 3 days ago with dyspnea on exertion, associated with chest pain, progressing and worsening to dyspnea at rest. Endorses non-productive cough and chills. Denies  N/V/D/C, abd pain, fevers, sick exposure   In the ED pt with O2 sats in the 70s - given nitro and placed on Bipap. CXR showing pulmonary edema. EKG with  new intraventricular block and STD V3,V4,V5, Troponin 4.26. , MB 14.54. given ASA 162mg x1 and heparin gtt started. Also Noted to have Temp 100.5,  WBC 21.10, lactate 4.8.. Received vanc, cefepime, azithro .   Vital signs: max 100.5F, HR , -174/, SpO2 100% BiPAP on 40% FiO2.     Patient with recent admission to [facility] for staged CORNELIA to ramus intermedius, (90% stenosis) Discharged on 11/20/17. (25 Nov 2017 03:04)      PAST MEDICAL & SURGICAL HISTORY:  GERD (gastroesophageal reflux disease)  CAD (coronary artery disease): s/p CABG  Hypothyroidism  Hyperlipidemia  Diabetes mellitus, type 2  S/P CABG (coronary artery bypass graft)      Social history:    FAMILY HISTORY:  No pertinent family history in first degree relatives    Allergies    No Known Allergies    Intolerances        Antimicrobials:          Vital Signs Last 24 Hrs  T(C): 36 (25 Nov 2017 08:00), Max: 38.1 (25 Nov 2017 00:46)  T(F): 96.8 (25 Nov 2017 08:00), Max: 100.5 (25 Nov 2017 00:46)  HR: 88 (25 Nov 2017 09:00) (80 - 113)  BP: 114/60 (25 Nov 2017 09:00) (92/55 - 174/103)  BP(mean): 74 (25 Nov 2017 09:00) (64 - 77)  RR: 28 (25 Nov 2017 09:00) (21 - 30)  SpO2: 98% (25 Nov 2017 09:00) (96% - 100%)                              11.1   16.27 )-----------( 307      ( 25 Nov 2017 07:52 )             33.2         11-25    133<L>  |  101  |  48<H>  ----------------------------<  406<H>  4.8   |  15<L>  |  2.16<H>    Ca    7.9<L>      25 Nov 2017 06:30  Phos  2.7     11-25  Mg     2.0     11-25    TPro  7.2  /  Alb  3.2<L>  /  TBili  0.3  /  DBili  x   /  AST  87<H>  /  ALT  38<H>  /  AlkPhos  68  11-25      RECENT CULTURES:  11-18 @ 21:48  URINE MIDSTREAM  NGTD    Blood culture 11/25/17: In lab    RADIOLOGY  CXR  --CHF--pulm edema Patient is a 71y old  Female who presents with a chief complaint of sob,chest pain (25 Nov 2017 04:50)    HPI:  71F Tajik speaking female PMH HTN, CKD, Hyperlipidemia, DM-II, CAD s/p CABG, s/p stents presenting with shortness of breath. Symptoms started 3 days ago with dyspnea on exertion, associated with chest pain, progressing and worsening to dyspnea at rest. Endorses non-productive cough and chills. Denies  N/V/D/C, abd pain, fevers, sick exposure   In the ED pt with O2 sats in the 70s - given nitro and placed on Bipap. CXR showing pulmonary edema. EKG with  new intraventricular block and STD V3,V4,V5, Troponin 4.26. , MB 14.54. given ASA 162mg x1 and heparin gtt started. Also Noted to have Temp 100.5,  WBC 21.10, lactate 4.8.. Received vanc, cefepime, azithro .   Vital signs: max 100.5F, HR , -174/, SpO2 100% BiPAP on 40% FiO2.     Patient with recent admission to [facility] for staged CORNELIA to ramus intermedius, (90% stenosis) Discharged on 11/20/17. (25 Nov 2017 03:04)      PAST MEDICAL & SURGICAL HISTORY:  GERD (gastroesophageal reflux disease)  CAD (coronary artery disease): s/p CABG  Hypothyroidism  Hyperlipidemia  Diabetes mellitus, type 2  S/P CABG (coronary artery bypass graft)      Social history:  Tajik speaking  Non smoker    FAMILY HISTORY:  No pertinent family history in first degree relatives    Allergies    No Known Allergies    Intolerances        Antimicrobials:          Vital Signs Last 24 Hrs  T(C): 36 (25 Nov 2017 08:00), Max: 38.1 (25 Nov 2017 00:46)  T(F): 96.8 (25 Nov 2017 08:00), Max: 100.5 (25 Nov 2017 00:46)  HR: 88 (25 Nov 2017 09:00) (80 - 113)  BP: 114/60 (25 Nov 2017 09:00) (92/55 - 174/103)  BP(mean): 74 (25 Nov 2017 09:00) (64 - 77)  RR: 28 (25 Nov 2017 09:00) (21 - 30)  SpO2: 98% (25 Nov 2017 09:00) (96% - 100%)                              11.1   16.27 )-----------( 307      ( 25 Nov 2017 07:52 )             33.2         11-25    133<L>  |  101  |  48<H>  ----------------------------<  406<H>  4.8   |  15<L>  |  2.16<H>    Ca    7.9<L>      25 Nov 2017 06:30  Phos  2.7     11-25  Mg     2.0     11-25    TPro  7.2  /  Alb  3.2<L>  /  TBili  0.3  /  DBili  x   /  AST  87<H>  /  ALT  38<H>  /  AlkPhos  68  11-25      RECENT CULTURES:  11-18 @ 21:48  URINE MIDSTREAM  NGTD    Blood culture 11/25/17: In lab    RADIOLOGY  CXR  --CHF--pulm edema Patient is a 71y old  Female who presents with a chief complaint of sob,chest pain (25 Nov 2017 04:50)    HPI:  71F Urdu speaking female PMH HTN, CKD, Hyperlipidemia, DM-II, CAD s/p CABG, s/p stents presenting with shortness of breath. Symptoms started 3 days ago with dyspnea on exertion, associated with chest pain, progressing and worsening to dyspnea at rest. Endorses non-productive cough and chills. Denies  N/V/D/C, abd pain, fevers, sick exposure   In the ED pt with O2 sats in the 70s - given nitro and placed on Bipap. CXR showing pulmonary edema. EKG with  new intraventricular block and STD V3,V4,V5, Troponin 4.26. , MB 14.54. given ASA 162mg x1 and heparin gtt started. Also Noted to have Temp 100.5,  WBC 21.10, lactate 4.8.. Received vanc, cefepime, azithro .   Vital signs: max 100.5F, HR , -174/, SpO2 100% BiPAP on 40% FiO2.     Patient with recent admission to [facility] for staged CORNELIA to ramus intermedius, (90% stenosis) Discharged on 11/20/17. (25 Nov 2017 03:04)      PAST MEDICAL & SURGICAL HISTORY:  GERD (gastroesophageal reflux disease)  CAD (coronary artery disease): s/p CABG  Hypothyroidism  Hyperlipidemia  Diabetes mellitus, type 2  S/P CABG (coronary artery bypass graft)      Social history:  Urdu speaking  Non smoker    FAMILY HISTORY:  No pertinent family history in first degree relatives    Allergies    No Known Allergies    Intolerances    Antimicrobials: Off    Vital Signs Last 24 Hrs  T(C): 36 (25 Nov 2017 08:00), Max: 38.1 (25 Nov 2017 00:46)  T(F): 96.8 (25 Nov 2017 08:00), Max: 100.5 (25 Nov 2017 00:46)  HR: 88 (25 Nov 2017 09:00) (80 - 113)  BP: 114/60 (25 Nov 2017 09:00) (92/55 - 174/103)  BP(mean): 74 (25 Nov 2017 09:00) (64 - 77)  RR: 28 (25 Nov 2017 09:00) (21 - 30)  SpO2: 98% (25 Nov 2017 09:00) (96% - 100%)                            11.1   16.27 )-----------( 307      ( 25 Nov 2017 07:52 )             33.2         11-25    133<L>  |  101  |  48<H>  ----------------------------<  406<H>  4.8   |  15<L>  |  2.16<H>    Ca    7.9<L>      25 Nov 2017 06:30  Phos  2.7     11-25  Mg     2.0     11-25    TPro  7.2  /  Alb  3.2<L>  /  TBili  0.3  /  DBili  x   /  AST  87<H>  /  ALT  38<H>  /  AlkPhos  68  11-25      RECENT CULTURES:  11-18 @ 21:48  URINE MIDSTREAM  NGTD    Blood culture 11/25/17: In lab    RADIOLOGY  CXR  --CHF--pulm edema

## 2017-11-25 NOTE — H&P ADULT - PROBLEM SELECTOR PLAN 3
with Temp 100.2, elevated WBC and lactate. CXR showing patchy opacities  Treat with Temp 100.2, elevated WBC and lactate. CXR showing patchy opacities  Treat empirically for possible pneumonia  Received vanc, cefepime, azithro in ED  Blood cultures sent

## 2017-11-25 NOTE — PROGRESS NOTE ADULT - ASSESSMENT
71F Mohawk speaking female PMH HTN, CKD, Hyperlipidemia, DM-II, CAD s/p CABG, s/p stents presenting with 3 days worsening shortness of breath. Patient with likely decompensated HF with elevate troponins likely from NSTEMI. Patient also found to have slight leukocytosis with fever and cough and consolidation on CXR initially started on antibx. Patient is currently chest pain free with increasing enzymes.

## 2017-11-25 NOTE — CONSULT NOTE ADULT - SUBJECTIVE AND OBJECTIVE BOX
CHIEF COMPLAINT:    HPI:  71-yo Tajik- and Rachel-speaking F h/o HTN, HLD, CKD III, DM2, CAD s/p 3V CABG (LIMA to LAD, SVG to OM, SVG to PDA @ [facility] ) and recent staged PCI 17 here with SOB.    Patient was recently adm to [facility] for LCH s/p intervention to ramus intermedius (90% stenosis) s/p CORNELIA x2.  Patient was continue on ASA, clopidogrel, metoprolol, atorvastatin.  The patient was discharged on 17.  She now returns for SOB.    The patient states that she experienced sudden SOB this morning.  She reports a mild, non-productive cough and chills but denies fevers.  She denies CP, N/V/D/C, abd pain.  She continues to have SOB.  ED note states that the patient began to experience symptoms 3 days ago, with associated chest pain, that has progressed in severity to SOB at rest.    The patient was found to be cyanotic and satting in the 70s on RA.  She was given ASA 162mg x1 and nitro 0.8mg x1.  She was placed on BiPAP.  The patient underwent X-ray and was found to have diffuse pulmonary edema.  In the ED, Tmax 100.5F, HR , -174/, SpO2 100% BiPAP on 40% FiO2.  Cardiac enzymes noted to be elevated, troponin 4.26, , MB 14.54.  WBC 21.10, Hgb 14.7, Cr 2.28, AST 94, pH 7.16, lactate 4.8.  EKG shows intraventricular block, new since October, no STEs.  Pt received vanc, cefepime, azithro and was started on heparin gtt per Cards.      PAST MEDICAL & SURGICAL HISTORY:  GERD (gastroesophageal reflux disease)  CAD (coronary artery disease): s/p CABG  Hypothyroidism  Hyperlipidemia  Diabetes mellitus, type 2  S/P CABG (coronary artery bypass graft)      FAMILY HISTORY:  No pertinent family history in first degree relatives      SOCIAL HISTORY:  Smoking: [ x ] Never Smoked [ ] Former Smoker (__ packs x ___ years) [ ] Current Smoker  (__ packs x ___ years)  Substance Use: [ x ] Never Used [ ] Used ____  EtOH Use: denies EtOH use  Marital Status: [ ] Single [ ]  [ ]  [ ]   Sexual History:  Occupation:  Recent Travel:  Country of Birth:  Advance Directives:    Allergies    No Known Allergies    Intolerances        HOME MEDICATIONS:    REVIEW OF SYSTEMS:  Constitutional: [ - ] fevers [ + ] chills [ ] weight loss [ ] weight gain  HEENT: [ ] dry eyes [ ] eye irritation [ ] postnasal drip [ ] nasal congestion  CV: [ - ] chest pain [ + ] orthopnea [ ] palpitations [ ] murmur  Resp: [ + ] cough [ + ] shortness of breath [ + ] dyspnea [ ] wheezing [ - ] sputum [ ] hemoptysis  GI: [ - ] nausea [ - ] vomiting [ - ] diarrhea [ - ] constipation [ - ] abd pain [ ] dysphagia   : [ ] dysuria [ ] nocturia [ ] hematuria [ ] increased urinary frequency  Musculoskeletal: [ ] back pain [ ] myalgias [ ] arthralgias [ ] fracture  Skin: [ ] rash [ ] itch  Neurological: [ ] headache [ ] dizziness [ ] syncope [ ] weakness [ ] numbness  Psychiatric: [ ] anxiety [ ] depression  Endocrine: [ ] diabetes [ ] thyroid problem  Hematologic/Lymphatic: [ ] anemia [ ] bleeding problem  Allergic/Immunologic: [ ] itchy eyes [ ] nasal discharge [ ] hives [ ] angioedema  [ ] All other systems negative  [ ] Unable to assess ROS because ________    OBJECTIVE:  ICU Vital Signs Last 24 Hrs  T(C): 38.1 (2017 00:46), Max: 38.1 (2017 00:46)  T(F): 100.5 (2017 00:46), Max: 100.5 (2017 00:46)  HR: 90 (2017 01:30) (90 - 113)  BP: 117/65 (2017 01:30) (117/65 - 174/103)  BP(mean): --  ABP: --  ABP(mean): --  RR: 25 (2017 01:30) (25 - 30)  SpO2: 100% (2017 01:30) (97% - 100%)        CAPILLARY BLOOD GLUCOSE          PHYSICAL EXAM:  General: awake, BiPAP in place  HEENT: PERRL  Lymph Nodes: unable to assess 2/2 BiPAP in place  Neck: unable to assess JVD, pt with scrunched neck while on BiPAP  Respiratory: diffuse wet crackles at all lung fields bilaterally  Cardiovascular: normal rate, regular rhythm  Abdomen: +BS, soft, NT, ND  Extremities: trace pedal edema to mid calf  Skin: warm, dry  Neurological: answering questions appropriately, following commands appropriately, recent and remote memory intact  Psychiatry: normal mood / affect    LINES:     HOSPITAL MEDICATIONS:  MEDICATIONS  (STANDING):  heparin  Infusion.  Unit(s)/Hr (7 mL/Hr) IV Continuous <Continuous>  vancomycin  IVPB 1000 milliGRAM(s) IV Intermittent once    MEDICATIONS  (PRN):      LABS:                        14.7   21.00 )-----------( 358      ( 2017 00:00 )             48.2     Hgb Trend: 14.7<--, 11.4<--, 11.0<--, 11.1<--  11    135  |  100  |  43<H>  ----------------------------<  407<H>  4.2   |  15<L>  |  2.28<H>    Ca    8.3<L>      2017 00:00    TPro  8.1  /  Alb  3.8  /  TBili  0.3  /  DBili  x   /  AST  94<H>  /  ALT  45<H>  /  AlkPhos  78      Creatinine Trend: 2.28<--, 1.77<--, 1.85<--, 1.77<--, 1.58<--  PT/INR - ( 2017 00:00 )   PT: 11.3 SEC;   INR: 1.01          PTT - ( 2017 00:00 )  PTT:33.9 SEC  Urinalysis Basic - ( 2017 01:00 )    Color: PLYEL / Appearance: CLEAR / S.020 / pH: 6.0  Gluc: >1000 / Ketone: NEGATIVE  / Bili: NEGATIVE / Urobili: NORMAL E.U.   Blood: TRACE / Protein: 150 / Nitrite: NEGATIVE   Leuk Esterase: NEGATIVE / RBC: 2-5 / WBC 10-25   Sq Epi: OCC / Non Sq Epi: x / Bacteria: FEW        Venous Blood Gas:   @ 00:00  7.16/54/< 24/15/22.1  VBG Lactate: 4.8      MICROBIOLOGY:     RADIOLOGY:  [ x ] Reviewed and interpreted by me  CXR pulm edema    EKG:

## 2017-11-25 NOTE — ED PROVIDER NOTE - CRITICAL CARE PROVIDED
direct patient care (not related to procedure)/conducted a detailed discussion of DNR status/consult w/ pt's family directly relating to pts condition/interpretation of diagnostic studies/consultation with other physicians

## 2017-11-25 NOTE — ED PROVIDER NOTE - MEDICAL DECISION MAKING DETAILS
71F w/ above history p/w worsening dyspnea over past 3 days, concerning for r/o ACS vs PNA  -labs, ekg, xr, bipap 71F w/ above history p/w worsening dyspnea over past 3 days, concerning for r/o ACS vs PNA  -labs, ekg, xr, bipap    Cabot: 71F with PMH of DM, CAD s/p CABG x 3. CORNELIA x 2 11/17/17, discharged on 11/20, coming in with CP and SOB x 4 days.  Found to be cyanotic and satting 70s.  HTN to 170s.  Was given  and nitro 0.8mg.  Placed on BIPAP in ED, received ketamine for compliance.  On exam, patient is drowsy, /86, saturating 100% on BIPAP, retractions improved, RR 20s, rhonchi at bases, abd benign, LEs without edema.  EKG shows intraventricular block, new since October, no STEs.  DDx includes ACS vs CHF exacerbation.  Will trend trops, check CXR, BNP, basic labs, call Dr. Quick, admit.

## 2017-11-25 NOTE — PROGRESS NOTE ADULT - PROBLEM SELECTOR PLAN 3
with Temp 100.5, elevated WBC and lactate. CXR showing patchy opacities received antibx vanc, cefepime and azithro in ED, ID consulted.  -RVP, bcx sent  -will monitor off of antibx per ID and restart if temp

## 2017-11-25 NOTE — PROGRESS NOTE ADULT - PROBLEM SELECTOR PLAN 4
Patient found to have glucose up to 400s with anion gap in setting of possible NSTEMI and decompensated HF.  -Started on insulin drip  -monitor anion gap  -Will switch back to basal bolus when gap closes

## 2017-11-25 NOTE — ED PROVIDER NOTE - ATTENDING CONTRIBUTION TO CARE
I, Jennifer Cabot, MD, have performed a history and physical exam of the patient and discussed their management with the resident. I reviewed the resident's note and agree with the documented findings and plan of care. My medical decision making and observations are found above.    Cabot: 71F with PMH of DM, CAD s/p CABG x 3. CORNELIA x 2 11/17/17, discharged on 11/20, coming in with CP and SOB x 4 days.  Found to be cyanotic and satting 70s.  HTN to 170s.  Was given  and nitro 0.8mg.  Placed on BIPAP in ED, received ketamine for compliance.  On exam, patient is drowsy, /86, saturating 100% on BIPAP, retractions improved, RR 20s, rhonchi at bases, abd benign, LEs without edema.  EKG shows intraventricular block, new since October, no STEs.  DDx includes ACS vs CHF exacerbation.  Will trend trops, check CXR, BNP, basic labs, call Dr. Quick, admit.

## 2017-11-25 NOTE — PROGRESS NOTE ADULT - PROBLEM SELECTOR PLAN 1
likely due to decompensated HF vs cardiogenic shock vs NSTEMI. Initially requiring bilevel, now maintaining on NC. Signs of volume overload.  -enzymes q6  -TTE for assessment of LV function  -Strict I/Os and standing daily weights  -furosemide gtt @5 mg/ hr

## 2017-11-25 NOTE — PROGRESS NOTE ADULT - PROBLEM SELECTOR PLAN 5
Problem: CAD (coronary artery disease).  Plan: continue ASA, Plavix. Lipitor  -Hold BB in setting of ADHF  -DASH diet

## 2017-11-25 NOTE — CONSULT NOTE ADULT - SUBJECTIVE AND OBJECTIVE BOX
HISTORY OF PRESENT ILLNESS:71F Estonian speaking female PMH HTN, CKD, Hyperlipidemia, DM-II, CAD s/p 3V CABG  (LIMA to LAD, SVG to OM, SVG to PDA) at [facility] 2014, s/p stents presenting with shortness of breath. Symptoms started 3 days ago with dyspnea on exertion, associated with chest pain, progressing and worsening to dyspnea at rest. Endorses non-productive cough and chills. Denies  N/V/D/C, abd pain, fevers, sick exposure   In the ED pt with O2 sats in the 70s - given nitro and placed on Bipap. CXR showing pulmonary edema. EKG with  new intraventricular block and STD V3,V4,V5, Troponin 4.26. , MB 14.54. given ASA 162mg x1 and heparin gtt started. Also Noted to have Temp 100.5,  WBC 21.10, lactate 4.8.. Received vanc, cefepime, azithro .   Vital signs: max 100.5F, HR , -174/, SpO2 100% BiPAP on 40% FiO2.     Patient with recent admission to [facility] for staged CORNELIA to ramus intermedius, (90% stenosis) on 11/17/17 xDischarged on 11/20/17.            PAST MEDICAL & SURGICAL HISTORY:  GERD (gastroesophageal reflux disease)  CAD (coronary artery disease): s/p CABG  Hypothyroidism  Hyperlipidemia  Diabetes mellitus, type 2  S/P CABG (coronary artery bypass graft)      [ x] Diabetes   x] Hypertension  [x ] Hyperlipidemia  [x ] CAD  [x ] PCI  [x ] CABG    PREVIOUS DIAGNOSTIC TESTING:    [ x] Echocardiogram: 9/22/17 in office        EF 50%        Low NL LVS fx Mild DD NL RV fx  mild LAE     [x] ECHO 11/25/17       EF 36%       < from: TTE with Doppler (w/Cont) (11.25.17 @ 12:33) >  CONCLUSIONS:  1. Mitral annular calcification, otherwise normal mitral  valve. Mild mitral regurgitation.  2. Normal left ventricular internal dimensions and wall  thicknesses.  3. Endocardium not well visualized; grossly moderate to  severe segmental left ventricularsystolic dysfunction.  Hypokinesis of the inferior, lateral, and inferolateral  walls.  Endocardial visualization enhanced with intravenous  injection of echo contrast (Definity).  No LV thrombus  seen.  4. The right ventricle is not well visualized;grossly  normal right ventricular systolic function.    < end of copied text >    [x ]  Catheterization: 11/17/17  < from: Cardiac Cath Lab - Adult (11.17.17 @ 11:48) >  VENTRICLES: No LV gram was performed; however, a recent echocardiogram  demonstrated normal global and regional LV function.  CORONARY VESSELS: The coronary circulation is right dominant.  LM:   --  LM: Angiography showed minor luminal irregularities with no flow  limiting lesions.  LAD:   --  Ostial LAD: There was a 100 % stenosis.  CX:   --  Circumflex: This vessel was not injected, but was visualized  during a prior cardiac catheterization.  RI:   --  Ramus intermedius: There was a 95 % stenosis.                   A drug-eluting stent was performed on the 95 % lesion in the  ramus intermedius. Following intervention there was an excellent  angiographic appearance with a 1 % residual stenosis.    RCA:   --  RCA: This vessel was not injected.  COMPLICATIONS: There were no complications.    < end of copied text >    x] Stress Test:  in office 2/29/17      proximal and mid anterior wall MI w/ moderate feng-infarct ischemia severe inferior wall ischemia  Moderate segmental Lv dysfunction EF 44%	    MEDICATIONS:  aspirin enteric coated 81 milliGRAM(s) Oral daily  clopidogrel Tablet 75 milliGRAM(s) Oral daily  furosemide Infusion 5 mG/Hr IV Continuous <Continuous>  heparin  Infusion. 500 Unit(s)/Hr IV Continuous <Continuous>      montelukast 10 milliGRAM(s) Oral at bedtime        dextrose 50% Injectable 12.5 Gram(s) IV Push once  dextrose 50% Injectable 25 Gram(s) IV Push once  dextrose 50% Injectable 25 Gram(s) IV Push once  dextrose Gel 1 Dose(s) Oral once PRN  glucagon  Injectable 1 milliGRAM(s) IntraMuscular once PRN  insulin Infusion 9 Unit(s)/Hr IV Continuous <Continuous>  levothyroxine 50 MICROGram(s) Oral daily    dextrose 5%. 1000 milliLiter(s) IV Continuous <Continuous>      Allergies    No Known Allergies    Intolerances        FAMILY HISTORY:  No pertinent family history in first degree relatives      SOCIAL HISTORY:    [x ] Non-smoker  [ ] Former Smoker  [ ] Current Smoker  [ ] Alcohol      REVIEW OF SYSTEMS:  [x ]chest pain PTA  [ x]shortness of breath  [  ]palpitations  [  ]syncope  [ ]near syncope [  ]diplopia  [  ]altered mental status [x] cough non productive   [  ]fevers  [x ]chills PTA  [ ]nausea  [ ] vomiting  [ ]abdominal pain  [ ]melena  [ ]BRBPR  [  ]epistaxis  [  ]rash  [  ]lower extremity edema          [x ] All others negative	  [ ] Unable to obtain    PHYSICAL EXAM:  T(C): 36.9 (11-25-17 @ 12:00), Max: 38.1 (11-25-17 @ 00:46)  HR: 93 (11-25-17 @ 14:00) (80 - 113)  BP: 113/53 (11-25-17 @ 14:00) (92/55 - 174/103)  RR: 28 (11-25-17 @ 14:00) (21 - 31)  SpO2: 98% (11-25-17 @ 14:00) (95% - 100%)  Wt(kg): --  I&O's Summary    24 Nov 2017 07:01 - 25 Nov 2017 07:00  --------------------------------------------------------  IN: 33 mL / OUT: 150 mL / NET: -117 mL    25 Nov 2017 07:01  -  25 Nov 2017 14:37  --------------------------------------------------------  IN: 177.5 mL / OUT: 1050 mL / NET: -872.5 mL        Appearance: No Signs of acute distress  HEENT:   Normal oral mucosa, PERRL, EOMI	  Lymphatic: No lymphadenopathy  Cardiovascular: Normal S1 S2, No JVD, No murmurs, No edema  Respiratory: decreased breath sounds B/L LL's 	  Gastrointestinal:  Soft, Non-tender, + BS	  Skin: No rashes, No ecchymoses, No cyanosis	  Extremities:  No clubbing, cyanosis +  edema  Vascular: Peripheral pulses palpable 2+ bilaterally    TELEMETRY: 	    ECG:  new intraventricular block and STD V3,V4,V5,	  RADIOLOGY:  CXR     < from: Xray Chest 1 View AP- PORTABLE-Urgent (11.25.17 @ 12:51) >  Impression:  Portable chest dated 11/25 at 12:15 PM shows a significant resolution of   bibasilar and perihilar opacities suggestive of resolving pulmonary edema.  Decrease in trace right pleural effusion.  Status poststernotomy.   Heart and mediastinum cannot be evaluated on this projection.      < end of copied text >  OTHER: 	  	  LABS:	 	    CARDIAC MARKERS:      CKMB: 25.04 ng/mL (11-25 @ 06:30)  CKMB: 14.54 ng/mL (11-25 @ 00:00)    CKMB Relative Index: 5.6 (11-25 @ 06:30)                            11.1   16.27 )-----------( 307      ( 25 Nov 2017 07:52 )             33.2     11-25    133<L>  |  101  |  48<H>  ----------------------------<  406<H>  4.8   |  15<L>  |  2.16<H>    Ca    7.9<L>      25 Nov 2017 06:30  Phos  2.7     11-25  Mg     2.0     11-25    TPro  7.2  /  Alb  3.2<L>  /  TBili  0.3  /  DBili  x   /  AST  87<H>  /  ALT  38<H>  /  AlkPhos  68  11-25    proBNP: Serum Pro-Brain Natriuretic Peptide: 4114 pg/mL (11-25 @ 00:00)    Lipid Profile:   HgA1c: Hemoglobin A1C, Whole Blood: 9.1 % (11-25 @ 06:30)    TSH: Thyroid Stimulating Hormone, Serum: 0.79 uIU/mL (11-25 @ 06:30)      ASSESSMENT/PLAN: 71F Estonian speaking female PMH HTN, CKD, Hyperlipidemia, DM-II, CAD s/p 3V CABG  (LIMA to LAD, SVG to OM, SVG to PDA) at [facility] 2014, s/p stents presenting with shortness of breath. Symptoms started 3 days ago with dyspnea on exertion, associated with chest pain, progressing and worsening to dyspnea at rest. Endorses non-productive cough and chills. Denies  N/V/D/C, abd pain, fevers, sick exposure   In the ED pt with O2 sats in the 70s - given nitro and placed on Bipap. CXR showing pulmonary edema. EKG with  new intraventricular block and STD V3,V4,V5, Troponin 4.26. , MB 14.54. given ASA 162mg x1 and heparin gtt started. Also Noted to have Temp 100.5,  WBC 21.10, lactate 4.8.. Received vanc, cefepime, azithro .   Vital signs: max 100.5F, HR , -174/, SpO2 100% BiPAP on 40% FiO2.     Patient with recent admission to [facility] for staged CORNELIA to ramus intermedius, (90% stenosis) on 11/17/17 --Discharged on 11/20/17.  	    CXR w/ possible PNA treated w/ zithromax, maxipime and vanco x 1 in Ed     ID input appreciated observe off abx low suspicion of bacterial PNA   CAD w/ recent stent placement   c/w ASA Plavix statin   elevating CE's   trend enzymes   on Hep gtt   will d/w Dr Ga re ischemic w/u when medically stable       noted Echo now  w/grossly moderate to severe segmental left ventricular systolic dysfunction. Hypokinesis of the inferior, lateral, and inferolateral walls.                was low normal LVS fx on Echo in September in office   Pulmonary edema   currently on Lasix gtt  monitor outpt K & cr levels   cont care as per ccu HISTORY OF PRESENT ILLNESS:71F Greek speaking female PMH HTN, CKD, Hyperlipidemia, DM-II, CAD s/p 3V CABG  (LIMA to LAD, SVG to OM, SVG to PDA) at [facility] 2014, s/p stents presenting with shortness of breath. Symptoms started 3 days ago with dyspnea on exertion, associated with chest pain, progressing and worsening to dyspnea at rest. Endorses non-productive cough and chills. Denies  N/V/D/C, abd pain, fevers, sick exposure   In the ED pt with O2 sats in the 70s - given nitro and placed on Bipap. CXR showing pulmonary edema. EKG with  new intraventricular block and STD V3,V4,V5, Troponin 4.26. , MB 14.54. given ASA 162mg x1 and heparin gtt started. Also Noted to have Temp 100.5,  WBC 21.10, lactate 4.8.. Received vanc, cefepime, azithro .   Vital signs: max 100.5F, HR , -174/, SpO2 100% BiPAP on 40% FiO2.     Patient with recent admission to [facility] for staged CORNELIA to ramus intermedius, (90% stenosis) on 11/17/17 xDischarged on 11/20/17.            PAST MEDICAL & SURGICAL HISTORY:  GERD (gastroesophageal reflux disease)  CAD (coronary artery disease): s/p CABG  Hypothyroidism  Hyperlipidemia  Diabetes mellitus, type 2  S/P CABG (coronary artery bypass graft)      [ x] Diabetes   x] Hypertension  [x ] Hyperlipidemia  [x ] CAD  [x ] PCI  [x ] CABG    PREVIOUS DIAGNOSTIC TESTING:    [ x] Echocardiogram: 9/22/17 in office        EF 50%        Low NL LVS fx Mild DD NL RV fx  mild LAE     [x] ECHO 11/25/17       EF 36%       < from: TTE with Doppler (w/Cont) (11.25.17 @ 12:33) >  CONCLUSIONS:  1. Mitral annular calcification, otherwise normal mitral  valve. Mild mitral regurgitation.  2. Normal left ventricular internal dimensions and wall  thicknesses.  3. Endocardium not well visualized; grossly moderate to  severe segmental left ventricularsystolic dysfunction.  Hypokinesis of the inferior, lateral, and inferolateral  walls.  Endocardial visualization enhanced with intravenous  injection of echo contrast (Definity).  No LV thrombus  seen.  4. The right ventricle is not well visualized;grossly  normal right ventricular systolic function.    < end of copied text >    [x ]  Catheterization: 11/17/17  < from: Cardiac Cath Lab - Adult (11.17.17 @ 11:48) >  VENTRICLES: No LV gram was performed; however, a recent echocardiogram  demonstrated normal global and regional LV function.  CORONARY VESSELS: The coronary circulation is right dominant.  LM:   --  LM: Angiography showed minor luminal irregularities with no flow  limiting lesions.  LAD:   --  Ostial LAD: There was a 100 % stenosis.  CX:   --  Circumflex: This vessel was not injected, but was visualized  during a prior cardiac catheterization.  RI:   --  Ramus intermedius: There was a 95 % stenosis.                   A drug-eluting stent was performed on the 95 % lesion in the  ramus intermedius. Following intervention there was an excellent  angiographic appearance with a 1 % residual stenosis.    RCA:   --  RCA: This vessel was not injected.  COMPLICATIONS: There were no complications.    < end of copied text >    x] Stress Test:  in office 2/29/17      proximal and mid anterior wall MI w/ moderate feng-infarct ischemia severe inferior wall ischemia  Moderate segmental Lv dysfunction EF 44%	    MEDICATIONS:  aspirin enteric coated 81 milliGRAM(s) Oral daily  clopidogrel Tablet 75 milliGRAM(s) Oral daily  furosemide Infusion 5 mG/Hr IV Continuous <Continuous>  heparin  Infusion. 500 Unit(s)/Hr IV Continuous <Continuous>      montelukast 10 milliGRAM(s) Oral at bedtime        dextrose 50% Injectable 12.5 Gram(s) IV Push once  dextrose 50% Injectable 25 Gram(s) IV Push once  dextrose 50% Injectable 25 Gram(s) IV Push once  dextrose Gel 1 Dose(s) Oral once PRN  glucagon  Injectable 1 milliGRAM(s) IntraMuscular once PRN  insulin Infusion 9 Unit(s)/Hr IV Continuous <Continuous>  levothyroxine 50 MICROGram(s) Oral daily    dextrose 5%. 1000 milliLiter(s) IV Continuous <Continuous>      Allergies    No Known Allergies    Intolerances        FAMILY HISTORY:  No pertinent family history in first degree relatives      SOCIAL HISTORY:    [x ] Non-smoker  [ ] Former Smoker  [ ] Current Smoker  [ ] Alcohol      REVIEW OF SYSTEMS:  [x ]chest pain PTA  [ x]shortness of breath  [  ]palpitations  [  ]syncope  [ ]near syncope [  ]diplopia  [  ]altered mental status [x] cough non productive   [  ]fevers  [x ]chills PTA  [ ]nausea  [ ] vomiting  [ ]abdominal pain  [ ]melena  [ ]BRBPR  [  ]epistaxis  [  ]rash  [  ]lower extremity edema          [x ] All others negative	  [ ] Unable to obtain    PHYSICAL EXAM:  T(C): 36.9 (11-25-17 @ 12:00), Max: 38.1 (11-25-17 @ 00:46)  HR: 93 (11-25-17 @ 14:00) (80 - 113)  BP: 113/53 (11-25-17 @ 14:00) (92/55 - 174/103)  RR: 28 (11-25-17 @ 14:00) (21 - 31)  SpO2: 98% (11-25-17 @ 14:00) (95% - 100%)  Wt(kg): --  I&O's Summary    24 Nov 2017 07:01 - 25 Nov 2017 07:00  --------------------------------------------------------  IN: 33 mL / OUT: 150 mL / NET: -117 mL    25 Nov 2017 07:01  -  25 Nov 2017 14:37  --------------------------------------------------------  IN: 177.5 mL / OUT: 1050 mL / NET: -872.5 mL        Appearance: No Signs of acute distress  HEENT:   Normal oral mucosa, PERRL, EOMI	  Lymphatic: No lymphadenopathy  Cardiovascular: Normal S1 S2, No JVD, No murmurs, No edema  Respiratory: crackles w/ decreased breath sounds B/L LL's  	  Gastrointestinal:  Soft, Non-tender, + BS	  Skin: No rashes, No ecchymoses, No cyanosis	  Extremities:  No clubbing, cyanosis or  edema  Vascular: Peripheral pulses palpable 2+ bilaterally    TELEMETRY: 	    ECG:  new intraventricular block and STD V3,V4,V5,	  RADIOLOGY:  CXR     < from: Xray Chest 1 View AP- PORTABLE-Urgent (11.25.17 @ 12:51) >  Impression:  Portable chest dated 11/25 at 12:15 PM shows a significant resolution of   bibasilar and perihilar opacities suggestive of resolving pulmonary edema.  Decrease in trace right pleural effusion.  Status poststernotomy.   Heart and mediastinum cannot be evaluated on this projection.      < end of copied text >  OTHER: 	  	  LABS:	 	    CARDIAC MARKERS:      CKMB: 25.04 ng/mL (11-25 @ 06:30)  CKMB: 14.54 ng/mL (11-25 @ 00:00)    CKMB Relative Index: 5.6 (11-25 @ 06:30)                            11.1   16.27 )-----------( 307      ( 25 Nov 2017 07:52 )             33.2     11-25    133<L>  |  101  |  48<H>  ----------------------------<  406<H>  4.8   |  15<L>  |  2.16<H>    Ca    7.9<L>      25 Nov 2017 06:30  Phos  2.7     11-25  Mg     2.0     11-25    TPro  7.2  /  Alb  3.2<L>  /  TBili  0.3  /  DBili  x   /  AST  87<H>  /  ALT  38<H>  /  AlkPhos  68  11-25    proBNP: Serum Pro-Brain Natriuretic Peptide: 4114 pg/mL (11-25 @ 00:00)    Lipid Profile:   HgA1c: Hemoglobin A1C, Whole Blood: 9.1 % (11-25 @ 06:30)    TSH: Thyroid Stimulating Hormone, Serum: 0.79 uIU/mL (11-25 @ 06:30)      ASSESSMENT/PLAN: 71F Greek speaking female PMH HTN, CKD, Hyperlipidemia, DM-II, CAD s/p 3V CABG  (LIMA to LAD, SVG to OM, SVG to PDA) at [facility] 2014, s/p stents presenting with shortness of breath. Symptoms started 3 days ago with dyspnea on exertion, associated with chest pain, progressing and worsening to dyspnea at rest. Endorses non-productive cough and chills. Denies  N/V/D/C, abd pain, fevers, sick exposure   In the ED pt with O2 sats in the 70s - given nitro and placed on Bipap. CXR showing pulmonary edema. EKG with  new intraventricular block and STD V3,V4,V5, Troponin 4.26. , MB 14.54. given ASA 162mg x1 and heparin gtt started. Also Noted to have Temp 100.5,  WBC 21.10, lactate 4.8.. Received vanc, cefepime, azithro .   Vital signs: max 100.5F, HR , -174/, SpO2 100% BiPAP on 40% FiO2.     Patient with recent admission to [facility] for staged CORNELIA to ramus intermedius, (90% stenosis) on 11/17/17 --Discharged on 11/20/17.  	    CXR w/ possible PNA treated w/ zithromax, maxipime and vanco x 1 in Ed     ID input appreciated observe off abx low suspicion of bacterial PNA   CAD w/ recent stent placement   c/w ASA Plavix statin   elevating CE's   trend enzymes   on Hep gtt   will d/w Dr Ga re ischemic w/u when medically stable       noted Echo now  w/grossly moderate to severe segmental left ventricular systolic dysfunction. Hypokinesis of the inferior, lateral, and inferolateral walls.                was low normal LVS fx on Echo in September in office   Pulmonary edema   currently on Lasix gtt  monitor outpt K & cr levels   cont care as per ccu

## 2017-11-25 NOTE — PROGRESS NOTE ADULT - PROBLEM SELECTOR PLAN 2
Patient with elevated CE and STD with chest pain. Chest pain free at present.   PARUL score: 7; Loaded with  mg, clopidogrel 600 mg PO and start heparin gtt  -Possible cath when medically managed  - continue ASA 81 mg qd, clopidogrel 75mg qd  -Continue Atorvastatin 40 mg QD,    Serial EKG, PRN chest pain

## 2017-11-25 NOTE — H&P ADULT - PROBLEM SELECTOR PLAN 4
Problem: CAD (coronary artery disease).  Plan: continue ASA, Plavix. Lipitor  Check lipid panel  Hol BB in setting of ADHF  low fat, DASH diet when of Bipap  Risk factor modification

## 2017-11-25 NOTE — CONSULT NOTE ADULT - ATTENDING COMMENTS
Patient seen and examined.  Agree with above.   -Admitted with heart failure, elevated WBC count/fevers, and NSTEMI.   -Pt. currently cp free.   -Pt. still volume overloaded - continue with Lasix gtt to keep O > I  -Renal consult with Dr. Maher  -Pt. received broad spectrum abx - appreciate ID follow up - observe off ABx  -F/u BCx  -Continue with hep gtt for nstemi  -Check TTE  -continue with current CCU care  -Further workup pending above    Corie Ga MD  [facility]  [address], [id number]  [place] [address]  office: [phone]  pager: [phone]
Hypoxic respiratory failure  CHF excaerbation with ACS  CCU eval  asa/plavix heparin gtt, trend CE  cuellar, diuresis, strict I and O, daily weight  pancx, abx, rvp
71 F Italian speaking female PMH HTN, CKD, Hyperlipidemia, DM-II, CAD s/p CABG, s/p stents presenting with shortness of breath. Patient had recent hospitalization with stent placement, now returns with chest pain, SOB. Never cough, no fevers, no sick contacts. Here had low grade fever to 100.5F, and leukocytosis (no left shift), elevated lactate and troponins. CXR with pulmonary edema. Lower suspicion for bacterial pneumonia. Fever/leukocytosis ? reactive to cardiac event. Would monitor off abx for now. Patient appears fatigued but non-toxic, overall well.  - Continue off abx  - If signs worsening, signs pna or sepsis would re-start Vanco/Zosyn empiric therapy  - F/U pending cultures  - Send RVP  - ACS as per cardiology team    Dell Angeles MD  Pager [phone]  After 5pm and on weekends call [phone]

## 2017-11-25 NOTE — CONSULT NOTE ADULT - ASSESSMENT
71F PMH HTN, CKD, Hyperlipidemia, DM-II, CAD s/p CABG, s/p stents presenting with shortness of breath. Symptoms started 3 days ago with dyspnea on exertion, associated with chest pain, progressing and worsening to dyspnea at rest. Pt found to have fever with leukocytosis in the ED. CXR with pulmonary edema,  - Respiratory failure  - Fever and leukocytosis  - Acute coronary syndrome  - Pneunmonia    Plan  - 71F PMH HTN, CKD, Hyperlipidemia, DM-II, CAD s/p CABG, s/p stents presenting with shortness of breath. Symptoms started 3 days ago with dyspnea on exertion, associated with chest pain, progressing and worsening to dyspnea at rest. Pt found to have fever with leukocytosis in the ED. CXR with pulmonary edema,  - Respiratory failure  - Fever and leukocytosis  - Acute coronary syndrome  - Pneunmonia    Plan  - Fu blood and urine cultures  - CXR--suggestive for CHF than pneumonia  - Fever, leukocytosis likely related to ACS  - Will monitor off abx

## 2017-11-25 NOTE — ED PROVIDER NOTE - NOTES
contacted Dr Quick's covering cardiologist- rec Heparin gtt for NSTEMI coverage, given pneumonia, unlikely can cath and given that she is on bipap- recommending icu consult, can admit to Dr Adame if MICU rejects

## 2017-11-25 NOTE — ED ADULT NURSE NOTE - OBJECTIVE STATEMENT
Reynaldo RN: Received pt in trauma B, pt A&Ox3 baseline, mainly Yakut speaking, translation provided by daughter, pt brought in by EMS on cpap, respirations labored b/l. Respiratory at bedside for bipap 14/6 fio2 100%. IVL 20g Angiocath placed on left AC. Labs sent. Pt unable to tolerate bipap. Pt given 20mg ketamine IV by MD Cabot. Report endorsed to primary RN Jose. Reynaldo RN: Received pt in trauma B, pt A&Ox3 baseline, mainly Estonian speaking, translation provided by daughter, pt brought in by EMS on cpap, respirations labored b/l. Respiratory at bedside for bipap 14/6 fio2 100%. IVL 20g Angiocath placed on left AC. Labs sent. Pt unable to tolerate bipap. Pt given 20mg ketamine IV by MD Cabot. Stage 1 noted on sacrum, blanchable redness noted on buttocks. Report endorsed to primary RN Jose. Reynaldo RN: Received pt in trauma B, pt A&Ox3 baseline, mainly Tajik speaking, translation provided by daughter, pt brought in by EMS on cpap, respirations labored b/l. Respiratory at bedside for bipap 14/6 fio2 100%. IVL 20g Angiocath placed on left AC. IVL 20g Angiocath placed on right forearm. Labs sent. Pt unable to tolerate bipap. Pt given 20mg ketamine IV by MD Cabot. Stage 1 noted on sacrum, blanchable redness noted on buttocks. Report endorsed to primary RN Jose.

## 2017-11-25 NOTE — CONSULT NOTE ADULT - ASSESSMENT
71-yo Chinese- and Rachel-speaking F h/o HTN, HLD, CKD III, DM2, CAD s/p 3V CABG (LIMA to LAD, SVG to OM, SVG to PDA @ [facility] 2014) and recent staged PCI 11/17/17 here with SOB found to have pulmonary edema in the setting of recent cardiac intervention.  Concern for post-cath complication, CHF exacerbation with NSTEMI.    #Neuro - mentating appropriately.    #CV  - Pulmonary edema - concern for post-cath complication, CHF exacerbation, NSTEMI, would recommend CCU evaluation and diuresis if BP tolerates.  - NSTEMI - trend Valentin, Cardiology consult for poss CCU admission.    #Resp  - AHRF 2/2 pulmonary edema - DDx as above, would recommend diuresis and wean off BiPAP when appropriate.    #GI - mildly elevated liver enzymes in the setting of poss NSTEMI.    #Renal - MERVIN on CKD likely in the setting of volume overload.    #ID - low-grade fever to 100.5F, non-specific and in the setting of NSTEMI.  Can continue abx if clinical suspicion is high for infection per primary team.    #Endo - DM2, mgmt per primary team.    #Heme - leukocytosis poss related to underlying infection but more suspicious for reactive leukocytosis in the setting of poss post-cath complication, CHF exacerbation, NSTEMI - continue to monitor, can continue abx if clinical suspicion is high for infection per primary team.    #FEN - NPO while on BiPAP.    #DVT PPX - on heparin gtt.    #Code Status - not addressed with patient.    Jennifer Roberts MD  PGY-2 | Internal Medicine  [phone] / [phone] 71-yo Sami- and Rachel-speaking F h/o HTN, HLD, CKD III, DM2, CAD s/p 3V CABG (LIMA to LAD, SVG to OM, SVG to PDA @ [facility] 2014) and recent staged PCI 11/17/17 here with SOB found to have pulmonary edema in the setting of recent cardiac intervention.  Concern for post-cath complication, CHF exacerbation with NSTEMI.  Pt is not a MICU candidate at this time, as presentation is c/w cardiac pathology.  Would recommend Cardiology consult and poss CCU admission.    #Neuro - mentating appropriately.    #CV  - Pulmonary edema - concern for post-cath complication, CHF exacerbation, NSTEMI, would recommend CCU evaluation and diuresis if BP tolerates.  - NSTEMI - trend Valentin, Cardiology consult for poss CCU admission.    #Resp  - AHRF 2/2 pulmonary edema - DDx as above, would recommend diuresis and wean off BiPAP when appropriate.    #GI - mildly elevated liver enzymes in the setting of poss NSTEMI.    #Renal - MERVIN on CKD likely in the setting of volume overload.    #ID - low-grade fever to 100.5F, non-specific and in the setting of NSTEMI.  Can continue abx if clinical suspicion is high for infection per primary team.    #Endo - DM2, mgmt per primary team.    #Heme - leukocytosis poss related to underlying infection but more suspicious for reactive leukocytosis in the setting of poss post-cath complication, CHF exacerbation, NSTEMI - continue to monitor, can continue abx if clinical suspicion is high for infection per primary team.    #FEN - NPO while on BiPAP.    #DVT PPX - on heparin gtt.    #Code Status - not addressed with patient.    Jennifer Roberts MD  PGY-2 | Internal Medicine  [phone] / [phone] 71-yo Spanish- and Rachel-speaking F h/o HTN, HLD, CKD III, DM2, CAD s/p 3V CABG (LIMA to LAD, SVG to OM, SVG to PDA @ [facility] 2014) and recent staged PCI 11/17/17 here with SOB found to have pulmonary edema in the setting of recent cardiac intervention.  Concern for post-cath complication, CHF exacerbation with NSTEMI.  Pt is not a MICU candidate at this time, as presentation is c/w cardiac pathology.  Would recommend Cardiology consult and poss CCU admission.    #Neuro - mentating appropriately.    #CV  - Pulmonary edema - concern for post-cath complication, CHF exacerbation, NSTEMI, would recommend CCU evaluation and diuresis if BP tolerates.  - NSTEMI - trend Valentin, Cardiology consult for poss CCU admission.    #Resp  - AHRF 2/2 pulmonary edema - DDx as above, would recommend diuresis and wean off BiPAP when appropriate.    #GI - mildly elevated liver enzymes in the setting of poss NSTEMI.    #Renal - MERVIN on CKD likely in the setting of volume overload.    #ID - low-grade fever to 100.5F, non-specific and in the setting of NSTEMI.  Can continue abx if clinical suspicion is high for infection per primary team.    #Endo  - DM2 - pt noted to be hyperglycemic without acidosis on repeat VBG, indicating that the patient is not in DKA.  However, diabetes appears poorly controlled.  Would recommend standing insulin and ISS.    #Heme - leukocytosis poss related to underlying infection but more suspicious for reactive leukocytosis in the setting of poss post-cath complication, CHF exacerbation, NSTEMI - continue to monitor, can continue abx if clinical suspicion is high for infection per primary team.    #FEN - NPO while on BiPAP.    #DVT PPX - on heparin gtt.    #Code Status - not addressed with patient.    Jennifer Roberts MD  PGY-2 | Internal Medicine  [phone] / [phone]

## 2017-11-25 NOTE — ED PROVIDER NOTE - CARE PLAN
Principal Discharge DX:	NSTEMI (non-ST elevated myocardial infarction)  Secondary Diagnosis:	Pneumonia

## 2017-11-25 NOTE — H&P ADULT - ASSESSMENT
71F Occitan speaking female PMH HTN, CKD, Hyperlipidemia, DM-II, CAD s/p CABG, s/p stents presenting with shortness of breath. Symptoms started 3 days ago with dyspnea on exertion, associated with chest pain, progressing and worsening to dyspnea at rest. Now with fluid overload likely 2/2 NSTEMI   In the ED pt with O2 sats in the 70s - given nitro and placed on Bipap. CXR showing pulmonary edema. EKG with  new intraventricular block and STD V3,V4,V5, Troponin 4.26. , MB 14.54. given ASA 162mg x1 and heparin gtt started. Also Noted to have Temp 100.5,  WBC 21.10, lactate 4.8.. Received vanc, cefepime, azithro .   Vital signs: max 100.5F, HR , -174/, SpO2 100% BiPAP on 40% FiO2.     Patient with recent admission to [facility] for LHC s/p CORNELIA x2. ( ramus intermedius, 90% stenosis) Discharged on 11/20/17. 71F Polish speaking female PMH HTN, CKD, Hyperlipidemia, DM-II, CAD s/p CABG, s/p stents presenting with 3 days worsening shortness of breath. Found to be fluid overloaded likely 2/2  NSTEMI

## 2017-11-25 NOTE — ED PROVIDER NOTE - PROGRESS NOTE DETAILS
Dyspnea improved with BiPAP -  rectal temp 100.5F, called cardiology, will call again [place]: Paged Dr. Quick x 2 with no response.  Trop returned at 4.2.  Called on call cardiology, who is aware and coming to evaluate the patient.  Will start heparin. [place]: Spoke with MICU and CCU.  Both to see her. [place]ot: CCU has accepted the patient and requests lasix gtt at 5, , plavix 600.  Will eval mental status for oral meds.

## 2017-11-25 NOTE — ED PROVIDER NOTE - OBJECTIVE STATEMENT
71F Mongolian speaking h/o HTN, CKD, Hyperlipidemia, DM-II, known CAD presenting with shortness of breath. Symptoms started 3 days ago with dyspnea on exertion, associated with chest pain, progressing and worsening to dyspnea at rest today, Denies F, cough, abd pain, N/V/D, recent illness, sick contacts.

## 2017-11-25 NOTE — PROGRESS NOTE ADULT - SUBJECTIVE AND OBJECTIVE BOX
HPI / INTERVAL HISTORY:  Patient seen and examined at bedside.  Patient seen with [facility] . Patient states that she is feeling better with no chest pain but mild non radiating chest pressure and SOB with a nonproductive cough. She does not have palpitations, diaphoresis, N/V, weakness.   She also denies fevers, chills, headache, abdominal pain, diarrhea, dysuria.   She states that she has been taking her medications since discharge.     OBJECTIVE:  Patient hyperglycemic overnight on bmp.   There have been no sustained arrhythmias.  Patient increasing troponins.   VITAL SIGNS:  ICU Vital Signs Last 24 Hrs  T(C): 36 (2017 08:00), Max: 38.1 (2017 00:46)  T(F): 96.8 (2017 08:00), Max: 100.5 (2017 00:46)  HR: 85 (2017 08:00) (80 - 113)  BP: 115/65 (2017 08:00) (92/55 - 174/103)  BP(mean): 77 (2017 08:00) (64 - 77)  ABP: --  ABP(mean): --  RR: 26 (2017 08:00) (21 - 30)  SpO2: 100% (2017 08:00) (96% - 100%)         @ :  -   @ 07:00  --------------------------------------------------------  IN: 33 mL / OUT: 150 mL / NET: -117 mL     @ 07:  -   @ 09:17  --------------------------------------------------------  IN: 12 mL / OUT: 350 mL / NET: -338 mL      CAPILLARY BLOOD GLUCOSE      POCT Blood Glucose.: 315 mg/dL (2017 08:53)      PHYSICAL EXAM:  Gen: initially lying in bed on bilevel, switched to NC. In no acute distress.   HEENT: NC/AT; PERRL, anicteric sclera  Neck: large neck circumference. supple, no JVD  Resp: bilaterally fine crackles inferior lobes bilaterally, tachypnic to 32  Cardiovasc: S1S2 normal; RRR; no murmurs, rubs or gallops. Sternal scar, well healed.   GI: soft, nondistended, nontender; +BS  Extr: warm, well-perfused, PT/DP pulses 2+ B/L; no LE edema  Skin: normal color and turgor  Neuro:     LABS:                        11.1   16.27 )-----------( 307      ( 2017 07:52 )             33.2         133<L>  |  101  |  48<H>  ----------------------------<  406<H>  4.8   |  15<L>  |  2.16<H>    Ca    7.9<L>      2017 06:30  Phos  2.7       Mg     2.0         TPro  7.2  /  Alb  3.2<L>  /  TBili  0.3  /  DBili  x   /  AST  87<H>  /  ALT  38<H>  /  AlkPhos  68      LIVER FUNCTIONS - ( 2017 06:30 )  Alb: 3.2 g/dL / Pro: 7.2 g/dL / ALK PHOS: 68 u/L / ALT: 38 u/L / AST: 87 u/L / GGT: x           PT/INR - ( 2017 00:00 )   PT: 11.3 SEC;   INR: 1.01          PTT - ( 2017 07:52 )  PTT:139.2 SEC  Urinalysis Basic - ( 2017 01:00 )    Color: PLYEL / Appearance: CLEAR / S.020 / pH: 6.0  Gluc: >1000 / Ketone: NEGATIVE  / Bili: NEGATIVE / Urobili: NORMAL E.U.   Blood: TRACE / Protein: 150 / Nitrite: NEGATIVE   Leuk Esterase: NEGATIVE / RBC: 2-5 / WBC 10-25   Sq Epi: OCC / Non Sq Epi: x / Bacteria: FEW      CARDIAC MARKERS ( 2017 06:30 )  x     / 4.35 ng/mL / 447 u/L / 25.04 ng/mL / x      CARDIAC MARKERS ( 2017 00:00 )  x     / 4.26 ng/mL / 420 u/L / 14.54 ng/mL / x              RADIOLOGY & ADDITIONAL TESTS:       MEDICATIONS:  aspirin enteric coated 81 milliGRAM(s) Oral daily  clopidogrel Tablet 75 milliGRAM(s) Oral daily  dextrose 5%. 1000 milliLiter(s) IV Continuous <Continuous>  dextrose 50% Injectable 12.5 Gram(s) IV Push once  dextrose 50% Injectable 25 Gram(s) IV Push once  dextrose 50% Injectable 25 Gram(s) IV Push once  dextrose Gel 1 Dose(s) Oral once PRN  furosemide Infusion 5 mG/Hr IV Continuous <Continuous>  glucagon  Injectable 1 milliGRAM(s) IntraMuscular once PRN  heparin  Infusion. 500 Unit(s)/Hr IV Continuous <Continuous>  insulin Infusion 6 Unit(s)/Hr IV Continuous <Continuous>  levothyroxine 50 MICROGram(s) Oral daily  montelukast 10 milliGRAM(s) Oral at bedtime      ALLERGIES:  No Known Allergies

## 2017-11-25 NOTE — H&P ADULT - PROBLEM SELECTOR PLAN 7
DM2 (diabetes mellitus, type II)  Monitor blood glucose ACHS with ISS  Check HgA1c  Diabetic education  Consistent carbohydrate diet  continue statin therapy

## 2017-11-25 NOTE — H&P ADULT - PROBLEM SELECTOR PLAN 2
Patient with elevated CE and STD with chest pain. Chest pain free at present.   PARUL score: 7  Admit to CCU  Load with  mg, Plavix 600 mg PO and start heparin gtt as per ACS protocol  NPO for possible cath in am  ASA 81 mg qd, Plavix 75mg qd  Continue Atorvastatin 40 mg QD,    Serial EKG, PRN chest pain  labs include serial cardiac enzymes, risk factor profile   Echo to evaluate LV function and wall motion abnormality Patient with elevated CE and STD with chest pain. Chest pain free at present.   PARUL score: 7  Admit to CCU R/O ACS  Load with  mg, Plavix 600 mg PO and start heparin gtt as per ACS protocol  NPO for possible cath in am  ASA 81 mg qd, Plavix 75mg qd  Continue Atorvastatin 40 mg QD,    Serial EKG, PRN chest pain  labs include serial cardiac enzymes, risk factor profile   Echo to evaluate LV function and wall motion abnormality

## 2017-11-26 DIAGNOSIS — I50.21 ACUTE SYSTOLIC (CONGESTIVE) HEART FAILURE: ICD-10-CM

## 2017-11-26 LAB
ALBUMIN SERPL ELPH-MCNC: 3.8 G/DL — SIGNIFICANT CHANGE UP (ref 3.3–5)
ALP SERPL-CCNC: 76 U/L — SIGNIFICANT CHANGE UP (ref 40–120)
ALT FLD-CCNC: 41 U/L — HIGH (ref 4–33)
APTT BLD: 57.1 SEC — HIGH (ref 27.5–37.4)
APTT BLD: 68.2 SEC — HIGH (ref 27.5–37.4)
AST SERPL-CCNC: 82 U/L — HIGH (ref 4–32)
BACTERIA UR CULT: SIGNIFICANT CHANGE UP
BASE EXCESS BLDV CALC-SCNC: -2.4 MMOL/L — SIGNIFICANT CHANGE UP
BASOPHILS # BLD AUTO: 0.07 K/UL — SIGNIFICANT CHANGE UP (ref 0–0.2)
BASOPHILS NFR BLD AUTO: 0.5 % — SIGNIFICANT CHANGE UP (ref 0–2)
BILIRUB SERPL-MCNC: 0.4 MG/DL — SIGNIFICANT CHANGE UP (ref 0.2–1.2)
BUN SERPL-MCNC: 44 MG/DL — HIGH (ref 7–23)
CALCIUM SERPL-MCNC: 8.6 MG/DL — SIGNIFICANT CHANGE UP (ref 8.4–10.5)
CHLORIDE SERPL-SCNC: 100 MMOL/L — SIGNIFICANT CHANGE UP (ref 98–107)
CK SERPL-CCNC: 391 U/L — HIGH (ref 25–170)
CO2 SERPL-SCNC: 18 MMOL/L — LOW (ref 22–31)
CREAT SERPL-MCNC: 1.9 MG/DL — HIGH (ref 0.5–1.3)
EOSINOPHIL # BLD AUTO: 0.19 K/UL — SIGNIFICANT CHANGE UP (ref 0–0.5)
EOSINOPHIL NFR BLD AUTO: 1.4 % — SIGNIFICANT CHANGE UP (ref 0–6)
GLUCOSE BLDC GLUCOMTR-MCNC: 204 MG/DL — HIGH (ref 70–99)
GLUCOSE BLDC GLUCOMTR-MCNC: 216 MG/DL — HIGH (ref 70–99)
GLUCOSE BLDC GLUCOMTR-MCNC: 280 MG/DL — HIGH (ref 70–99)
GLUCOSE BLDC GLUCOMTR-MCNC: 282 MG/DL — HIGH (ref 70–99)
GLUCOSE SERPL-MCNC: 301 MG/DL — HIGH (ref 70–99)
HCO3 BLDV-SCNC: 23 MMOL/L — SIGNIFICANT CHANGE UP (ref 20–27)
HCT VFR BLD CALC: 34.7 % — SIGNIFICANT CHANGE UP (ref 34.5–45)
HGB BLD-MCNC: 11.5 G/DL — SIGNIFICANT CHANGE UP (ref 11.5–15.5)
IMM GRANULOCYTES # BLD AUTO: 0.1 # — SIGNIFICANT CHANGE UP
IMM GRANULOCYTES NFR BLD AUTO: 0.8 % — SIGNIFICANT CHANGE UP (ref 0–1.5)
LYMPHOCYTES # BLD AUTO: 24.2 % — SIGNIFICANT CHANGE UP (ref 13–44)
LYMPHOCYTES # BLD AUTO: 3.22 K/UL — SIGNIFICANT CHANGE UP (ref 1–3.3)
MAGNESIUM SERPL-MCNC: 2 MG/DL — SIGNIFICANT CHANGE UP (ref 1.6–2.6)
MCHC RBC-ENTMCNC: 29.5 PG — SIGNIFICANT CHANGE UP (ref 27–34)
MCHC RBC-ENTMCNC: 33.1 % — SIGNIFICANT CHANGE UP (ref 32–36)
MCV RBC AUTO: 89 FL — SIGNIFICANT CHANGE UP (ref 80–100)
MONOCYTES # BLD AUTO: 1.25 K/UL — HIGH (ref 0–0.9)
MONOCYTES NFR BLD AUTO: 9.4 % — SIGNIFICANT CHANGE UP (ref 2–14)
NEUTROPHILS # BLD AUTO: 8.47 K/UL — HIGH (ref 1.8–7.4)
NEUTROPHILS NFR BLD AUTO: 63.7 % — SIGNIFICANT CHANGE UP (ref 43–77)
NRBC # FLD: 0 — SIGNIFICANT CHANGE UP
PCO2 BLDV: 34 MMHG — LOW (ref 41–51)
PH BLDV: 7.41 PH — SIGNIFICANT CHANGE UP (ref 7.32–7.43)
PHOSPHATE SERPL-MCNC: 2.3 MG/DL — LOW (ref 2.5–4.5)
PLATELET # BLD AUTO: 335 K/UL — SIGNIFICANT CHANGE UP (ref 150–400)
PMV BLD: 9.5 FL — SIGNIFICANT CHANGE UP (ref 7–13)
PO2 BLDV: 67 MMHG — HIGH (ref 35–40)
POTASSIUM SERPL-MCNC: 4.6 MMOL/L — SIGNIFICANT CHANGE UP (ref 3.5–5.3)
POTASSIUM SERPL-SCNC: 4.6 MMOL/L — SIGNIFICANT CHANGE UP (ref 3.5–5.3)
PROT SERPL-MCNC: 8 G/DL — SIGNIFICANT CHANGE UP (ref 6–8.3)
RBC # BLD: 3.9 M/UL — SIGNIFICANT CHANGE UP (ref 3.8–5.2)
RBC # FLD: 14.6 % — HIGH (ref 10.3–14.5)
SAO2 % BLDV: 93.1 % — HIGH (ref 60–85)
SODIUM SERPL-SCNC: 137 MMOL/L — SIGNIFICANT CHANGE UP (ref 135–145)
SPECIMEN SOURCE: SIGNIFICANT CHANGE UP
TROPONIN T SERPL-MCNC: 5.77 NG/ML — HIGH (ref 0–0.06)
VANCOMYCIN FLD-MCNC: 9.6 UG/ML — SIGNIFICANT CHANGE UP
WBC # BLD: 13.3 K/UL — HIGH (ref 3.8–10.5)
WBC # FLD AUTO: 13.3 K/UL — HIGH (ref 3.8–10.5)

## 2017-11-26 PROCEDURE — 93010 ELECTROCARDIOGRAM REPORT: CPT

## 2017-11-26 PROCEDURE — 99232 SBSQ HOSP IP/OBS MODERATE 35: CPT

## 2017-11-26 RX ORDER — SENNA PLUS 8.6 MG/1
2 TABLET ORAL AT BEDTIME
Qty: 0 | Refills: 0 | Status: DISCONTINUED | OUTPATIENT
Start: 2017-11-26 | End: 2017-12-11

## 2017-11-26 RX ORDER — PANTOPRAZOLE SODIUM 20 MG/1
40 TABLET, DELAYED RELEASE ORAL
Qty: 0 | Refills: 0 | Status: DISCONTINUED | OUTPATIENT
Start: 2017-11-26 | End: 2017-11-27

## 2017-11-26 RX ORDER — INSULIN LISPRO 100/ML
23 VIAL (ML) SUBCUTANEOUS
Qty: 0 | Refills: 0 | Status: DISCONTINUED | OUTPATIENT
Start: 2017-11-26 | End: 2017-11-27

## 2017-11-26 RX ORDER — DOCUSATE SODIUM 100 MG
100 CAPSULE ORAL THREE TIMES A DAY
Qty: 0 | Refills: 0 | Status: DISCONTINUED | OUTPATIENT
Start: 2017-11-26 | End: 2017-12-11

## 2017-11-26 RX ORDER — INSULIN GLARGINE 100 [IU]/ML
64 INJECTION, SOLUTION SUBCUTANEOUS AT BEDTIME
Qty: 0 | Refills: 0 | Status: DISCONTINUED | OUTPATIENT
Start: 2017-11-26 | End: 2017-11-27

## 2017-11-26 RX ORDER — SIMETHICONE 80 MG/1
80 TABLET, CHEWABLE ORAL ONCE
Qty: 0 | Refills: 0 | Status: COMPLETED | OUTPATIENT
Start: 2017-11-26 | End: 2017-11-26

## 2017-11-26 RX ORDER — FUROSEMIDE 40 MG
40 TABLET ORAL EVERY 12 HOURS
Qty: 0 | Refills: 0 | Status: DISCONTINUED | OUTPATIENT
Start: 2017-11-26 | End: 2017-11-28

## 2017-11-26 RX ADMIN — Medication 81 MILLIGRAM(S): at 11:17

## 2017-11-26 RX ADMIN — Medication 4: at 13:02

## 2017-11-26 RX ADMIN — SIMETHICONE 80 MILLIGRAM(S): 80 TABLET, CHEWABLE ORAL at 04:17

## 2017-11-26 RX ADMIN — CLOPIDOGREL BISULFATE 75 MILLIGRAM(S): 75 TABLET, FILM COATED ORAL at 11:16

## 2017-11-26 RX ADMIN — Medication 20 UNIT(S): at 17:49

## 2017-11-26 RX ADMIN — PANTOPRAZOLE SODIUM 40 MILLIGRAM(S): 20 TABLET, DELAYED RELEASE ORAL at 11:17

## 2017-11-26 RX ADMIN — Medication 20 UNIT(S): at 09:11

## 2017-11-26 RX ADMIN — Medication 100 MILLIGRAM(S): at 22:19

## 2017-11-26 RX ADMIN — Medication 20 UNIT(S): at 13:03

## 2017-11-26 RX ADMIN — Medication 100 MILLIGRAM(S): at 06:59

## 2017-11-26 RX ADMIN — Medication 100 MILLIGRAM(S): at 13:03

## 2017-11-26 RX ADMIN — CHLORHEXIDINE GLUCONATE 1 APPLICATION(S): 213 SOLUTION TOPICAL at 11:17

## 2017-11-26 RX ADMIN — MONTELUKAST 10 MILLIGRAM(S): 4 TABLET, CHEWABLE ORAL at 22:19

## 2017-11-26 RX ADMIN — Medication 6: at 09:10

## 2017-11-26 RX ADMIN — Medication 4: at 17:49

## 2017-11-26 RX ADMIN — INSULIN GLARGINE 64 UNIT(S): 100 INJECTION, SOLUTION SUBCUTANEOUS at 22:19

## 2017-11-26 RX ADMIN — SENNA PLUS 2 TABLET(S): 8.6 TABLET ORAL at 22:19

## 2017-11-26 RX ADMIN — HEPARIN SODIUM 7 UNIT(S)/HR: 5000 INJECTION INTRAVENOUS; SUBCUTANEOUS at 19:18

## 2017-11-26 RX ADMIN — Medication 50 MICROGRAM(S): at 06:59

## 2017-11-26 RX ADMIN — Medication 2: at 22:20

## 2017-11-26 NOTE — CONSULT NOTE ADULT - SUBJECTIVE AND OBJECTIVE BOX
HPI:  71F Greenlandic speaking female PMH HTN, CKD, Hyperlipidemia, DM-II, CAD s/p CABG, s/p stents presenting with shortness of breath. Symptoms started 3 days ago with dyspnea on exertion, associated with chest pain, progressing and worsening to dyspnea at rest. Endorses non-productive cough and chills. Denies  N/V/D/C, abd pain, fevers, sick exposure   In the ED pt with O2 sats in the 70s - given nitro and placed on Bipap. CXR showing pulmonary edema. EKG with  new intraventricular block and STD V3,V4,V5, Troponin 4.26. , MB 14.54. given ASA 162mg x1 and heparin gtt started. Also Noted to have Temp 100.5,  WBC 21.10, lactate 4.8.. Received vanc, cefepime, azithro .   Vital signs: max 100.5F, HR , -174/, SpO2 100% BiPAP on 40% FiO2.     Patient with recent admission to [facility] for staged CORNELIA to ramus intermedius, (90% stenosis) Discharged on 11/20/17. (25 Nov 2017 03:04)  Patient has history of diabetes, on insulin at home, no recent hypoglycemic episodes, no polyuria polydipsia. Patient follows up with PCP for diabetes management. daughter at bedside, helped with history taking.    PAST MEDICAL & SURGICAL HISTORY:  GERD (gastroesophageal reflux disease)  CAD (coronary artery disease): s/p CABG  Hypothyroidism  Hyperlipidemia  Diabetes mellitus, type 2  S/P CABG (coronary artery bypass graft)      FAMILY HISTORY:  No pertinent family history in first degree relatives      Social History:    Outpatient Medications:    MEDICATIONS  (STANDING):  aspirin enteric coated 81 milliGRAM(s) Oral daily  chlorhexidine 4% Liquid 1 Application(s) Topical daily  clopidogrel Tablet 75 milliGRAM(s) Oral daily  dextrose 5%. 1000 milliLiter(s) (50 mL/Hr) IV Continuous <Continuous>  dextrose 50% Injectable 12.5 Gram(s) IV Push once  dextrose 50% Injectable 25 Gram(s) IV Push once  dextrose 50% Injectable 25 Gram(s) IV Push once  docusate sodium 100 milliGRAM(s) Oral three times a day  furosemide   Injectable 40 milliGRAM(s) IV Push every 12 hours  heparin  Infusion 700 Unit(s)/Hr (7 mL/Hr) IV Continuous <Continuous>  insulin glargine Injectable (LANTUS) 64 Unit(s) SubCutaneous at bedtime  insulin lispro (HumaLOG) corrective regimen sliding scale   SubCutaneous three times a day before meals  insulin lispro (HumaLOG) corrective regimen sliding scale   SubCutaneous at bedtime  insulin lispro Injectable (HumaLOG) 23 Unit(s) SubCutaneous three times a day before meals  levothyroxine 50 MICROGram(s) Oral daily  montelukast 10 milliGRAM(s) Oral at bedtime  pantoprazole    Tablet 40 milliGRAM(s) Oral before breakfast  senna 2 Tablet(s) Oral at bedtime    MEDICATIONS  (PRN):  dextrose Gel 1 Dose(s) Oral once PRN Blood Glucose LESS THAN 70 milliGRAM(s)/deciliter  glucagon  Injectable 1 milliGRAM(s) IntraMuscular once PRN Glucose LESS THAN 70 milligrams/deciliter  glucagon  Injectable 1 milliGRAM(s) IntraMuscular once PRN Glucose LESS THAN 70 milligrams/deciliter      Allergies    No Known Allergies    Intolerances      Review of Systems:  Constitutional: No fever, no chills  Eyes: No blurry vision  Neuro: No tremors  HEENT: No pain, no neck swelling  Cardiovascular: No chest pain, no palpitations  Respiratory: Has SOB, no cough  GI: No nausea, vomiting, abdominal pain  : No dysuria  Skin: no rash  MSK: Has leg swelling, no foot ulcers.  Psych: no depression  Endocrine: no polyuria, polydipsia    ALL OTHER SYSTEMS REVIEWED AND NEGATIVE    UNABLE TO OBTAIN    PHYSICAL EXAM:  VITALS: T(C): 36.8 (11-26-17 @ 20:00)  T(F): 98.2 (11-26-17 @ 20:00), Max: 99 (11-26-17 @ 04:00)  HR: 93 (11-26-17 @ 20:00) (89 - 108)  BP: 126/60 (11-26-17 @ 20:00) (107/59 - 140/69)  RR:  (17 - 29)  SpO2:  (93% - 100%)  Wt(kg): --  GENERAL: NAD, well-groomed, well-developed  EYES: No proptosis, no lid lag  HEENT:  Atraumatic, Normocephalic  THYROID: Normal size, no palpable nodules  RESPIRATORY: Clear to auscultation bilaterally; No rales, rhonchi, wheezing  CARDIOVASCULAR: Si S2, No murmurs;  GI: Soft, non distended, normal bowel sounds  SKIN: Dry, intact, No rashes or lesions  MUSCULOSKELETAL: Has BL lower extremity edema.  NEURO:  no tremor, sensation decreased in feet BL,    POCT Blood Glucose.: 204 mg/dL (11-26-17 @ 17:45)  POCT Blood Glucose.: 216 mg/dL (11-26-17 @ 12:42)  POCT Blood Glucose.: 282 mg/dL (11-26-17 @ 09:06)  POCT Blood Glucose.: 245 mg/dL (11-25-17 @ 21:12)  POCT Blood Glucose.: 162 mg/dL (11-25-17 @ 17:27)  POCT Blood Glucose.: 198 mg/dL (11-25-17 @ 16:36)  POCT Blood Glucose.: 230 mg/dL (11-25-17 @ 15:31)  POCT Blood Glucose.: 290 mg/dL (11-25-17 @ 14:32)  POCT Blood Glucose.: 210 mg/dL (11-25-17 @ 13:26)  POCT Blood Glucose.: 236 mg/dL (11-25-17 @ 12:25)  POCT Blood Glucose.: 316 mg/dL (11-25-17 @ 11:26)  POCT Blood Glucose.: 357 mg/dL (11-25-17 @ 10:29)  POCT Blood Glucose.: 315 mg/dL (11-25-17 @ 08:53)                            11.5   13.30 )-----------( 335      ( 26 Nov 2017 05:00 )             34.7       11-26    137  |  100  |  44<H>  ----------------------------<  301<H>  4.6   |  18<L>  |  1.90<H>    EGFR if : 31  EGFR if non : 26    Ca    8.6      11-26  Mg     2.0     11-26  Phos  2.3     11-26    TPro  8.0  /  Alb  3.8  /  TBili  0.4  /  DBili  x   /  AST  82<H>  /  ALT  41<H>  /  AlkPhos  76  11-26      Thyroid Function Tests:  11-25 @ 06:30 TSH 0.79 FreeT4 -- T3 -- Anti TPO -- Anti Thyroglobulin Ab -- TSI --      Hemoglobin A1C, Whole Blood: 9.1 % <H> [4.0 - 5.6] (11-25-17 @ 06:30)  Hemoglobin A1C, Whole Blood: 10.4 % <H> [4.0 - 5.6] (10-13-17 @ 08:45)      11-25 Chol 136 LDL 80 HDL 37<L> Trig 144    Radiology:

## 2017-11-26 NOTE — PROGRESS NOTE ADULT - SUBJECTIVE AND OBJECTIVE BOX
Tele: NSR HR 80-90    Overnight: Patient nauseous this morning. No CP, SOB, palpitations    MEDICATIONS  (STANDING):  aspirin enteric coated 81 milliGRAM(s) Oral daily  cefepime  IVPB 1000 milliGRAM(s) IV Intermittent daily  clopidogrel Tablet 75 milliGRAM(s) Oral daily  docusate sodium 100 milliGRAM(s) Oral three times a day  furosemide Infusion 5 mG/Hr (2.5 mL/Hr) IV Continuous <Continuous>  heparin  Infusion 700 Unit(s)/Hr (7 mL/Hr) IV Continuous <Continuous>  insulin glargine Injectable (LANTUS) 60 Unit(s) SubCutaneous at bedtime  insulin lispro (HumaLOG) corrective regimen sliding scale   SubCutaneous three times a day before meals  insulin lispro (HumaLOG) corrective regimen sliding scale   SubCutaneous at bedtime  insulin lispro Injectable (HumaLOG) 20 Unit(s) SubCutaneous three times a day before meals  levothyroxine 50 MICROGram(s) Oral daily  montelukast 10 milliGRAM(s) Oral at bedtime  senna 2 Tablet(s) Oral at bedtime    MEDICATIONS  (PRN):  dextrose Gel 1 Dose(s) Oral once PRN Blood Glucose LESS THAN 70 milliGRAM(s)/deciliter  glucagon  Injectable 1 milliGRAM(s) IntraMuscular once PRN Glucose LESS THAN 70 milligrams/deciliter  glucagon  Injectable 1 milliGRAM(s) IntraMuscular once PRN Glucose LESS THAN 70 milligrams/deciliter          Vital Signs Last 24 Hrs  T(C): 37.2 (26 Nov 2017 04:00), Max: 37.4 (25 Nov 2017 20:00)  T(F): 99 (26 Nov 2017 04:00), Max: 99.4 (25 Nov 2017 20:00)  HR: 91 (26 Nov 2017 07:35) (83 - 108)  BP: 130/75 (26 Nov 2017 07:00) (107/59 - 133/60)  BP(mean): 87 (26 Nov 2017 07:00) (67 - 91)  RR: 28 (26 Nov 2017 07:00) (19 - 31)  SpO2: 97% (26 Nov 2017 07:35) (93% - 100%)  I&O's Detail    25 Nov 2017 07:01  -  26 Nov 2017 07:00  --------------------------------------------------------  IN:    furosemide Infusion: 60 mL    heparin  Infusion.: 65 mL    heparin  Infusion.: 7 mL    heparin Infusion: 63 mL    insulin Infusion: 6 mL    insulin Infusion: 17 mL    insulin Infusion: 10 mL    insulin Infusion: 18 mL    IV PiggyBack: 350 mL    Oral Fluid: 300 mL  Total IN: 896 mL    OUT:    Indwelling Catheter - Urethral: 3810 mL  Total OUT: 3810 mL    Total NET: -2914 mL            Physical exam:   Gen- NAD  Resp- Crackles b/l  CV- S1S2  ABD- +BS x 4 ND/NT  EXT- No edema   Neuro- A&O x 3                            11.5   13.30 )-----------( 335      ( 26 Nov 2017 05:00 )             34.7     PT/INR - ( 25 Nov 2017 00:00 )   PT: 11.3 SEC;   INR: 1.01          PTT - ( 26 Nov 2017 05:00 )  PTT:57.1 SEC  26 Nov 2017 05:00    137    |  100    |  44<H>  ----------------------------<  301<H>  4.6     |  18<L>  |  1.90<H>  25 Nov 2017 21:50    137    |  100    |  46<H>  ----------------------------<  251<H>  4.5     |  19<L>  |  1.89<H>    Ca    8.6        26 Nov 2017 05:00  Ca    8.3<L>      25 Nov 2017 21:50  Phos  2.3<L>     26 Nov 2017 05:00  Phos  2.7       25 Nov 2017 06:30  Mg     2.0       26 Nov 2017 05:00  Mg     2.0       25 Nov 2017 06:30    TPro  8.0    /  Alb  3.8    /  TBili  0.4    /  DBili  x      /  AST  82<H>  /  ALT  41<H>  /  AlkPhos  76     26 Nov 2017 05:00  TPro  7.2    /  Alb  3.2<L>  /  TBili  0.3    /  DBili  x      /  AST  87<H>  /  ALT  38<H>  /  AlkPhos  68     25 Nov 2017 06:30    CARDIAC MARKERS    reviewed

## 2017-11-26 NOTE — PROGRESS NOTE ADULT - SUBJECTIVE AND OBJECTIVE BOX
Subjective:   	 no chest pain   + shortness of breath                  OOB-->chair w/ legs elevated    MEDICATIONS:  MEDICATIONS  (STANDING):  aspirin enteric coated 81 milliGRAM(s) Oral daily  chlorhexidine 4% Liquid 1 Application(s) Topical daily  clopidogrel Tablet 75 milliGRAM(s) Oral daily  dextrose 5%. 1000 milliLiter(s) (50 mL/Hr) IV Continuous <Continuous>  dextrose 50% Injectable 12.5 Gram(s) IV Push once  dextrose 50% Injectable 25 Gram(s) IV Push once  dextrose 50% Injectable 25 Gram(s) IV Push once  docusate sodium 100 milliGRAM(s) Oral three times a day  furosemide   Injectable 40 milliGRAM(s) IV Push every 12 hours  heparin  Infusion 700 Unit(s)/Hr (7 mL/Hr) IV Continuous <Continuous>  insulin glargine Injectable (LANTUS) 60 Unit(s) SubCutaneous at bedtime  insulin lispro (HumaLOG) corrective regimen sliding scale   SubCutaneous three times a day before meals  insulin lispro (HumaLOG) corrective regimen sliding scale   SubCutaneous at bedtime  insulin lispro Injectable (HumaLOG) 20 Unit(s) SubCutaneous three times a day before meals  levothyroxine 50 MICROGram(s) Oral daily  montelukast 10 milliGRAM(s) Oral at bedtime  pantoprazole    Tablet 40 milliGRAM(s) Oral before breakfast  senna 2 Tablet(s) Oral at bedtime    MEDICATIONS  (PRN):  dextrose Gel 1 Dose(s) Oral once PRN Blood Glucose LESS THAN 70 milliGRAM(s)/deciliter  glucagon  Injectable 1 milliGRAM(s) IntraMuscular once PRN Glucose LESS THAN 70 milligrams/deciliter  glucagon  Injectable 1 milliGRAM(s) IntraMuscular once PRN Glucose LESS THAN 70 milligrams/deciliter      LABS:	 	    CARDIAC MARKERS:  CARDIAC MARKERS ( 26 Nov 2017 05:00 )  x     / 5.77 ng/mL / 391 u/L / x     / x      CARDIAC MARKERS ( 25 Nov 2017 21:50 )  x     / 6.17 ng/mL / 391 u/L / 17.89 ng/mL / x      CARDIAC MARKERS ( 25 Nov 2017 14:25 )  x     / 5.17 ng/mL / 439 u/L / 23.67 ng/mL / x      CARDIAC MARKERS ( 25 Nov 2017 06:30 )  x     / 4.35 ng/mL / 447 u/L / 25.04 ng/mL / x      CARDIAC MARKERS ( 25 Nov 2017 00:00 )  x     / 4.26 ng/mL / 420 u/L / 14.54 ng/mL / x                                    11.5   13.30 )-----------( 335      ( 26 Nov 2017 05:00 )             34.7     11-26    137  |  100  |  44<H>  ----------------------------<  301<H>  4.6   |  18<L>  |  1.90<H>    Ca    8.6      26 Nov 2017 05:00  Phos  2.3     11-26  Mg     2.0     11-26    TPro  8.0  /  Alb  3.8  /  TBili  0.4  /  DBili  x   /  AST  82<H>  /  ALT  41<H>  /  AlkPhos  76  11-26    COAGS:   PTT - ( 26 Nov 2017 10:00 )  PTT:68.2 SEC    proBNP:   Lipid Profile:   HgA1c:   TSH:       PHYSICAL EXAM:  T(C): 36.5 (11-26-17 @ 12:00), Max: 37.4 (11-25-17 @ 20:00)  HR: 98 (11-26-17 @ 12:00) (89 - 108)  BP: 111/64 (11-26-17 @ 12:00) (107/59 - 133/60)  RR: 28 (11-26-17 @ 12:00) (19 - 31)  SpO2: 95% (11-26-17 @ 12:00) (93% - 100%)  Wt(kg): --  I&O's Summary    25 Nov 2017 07:01  -  26 Nov 2017 07:00  --------------------------------------------------------  IN: 896 mL / OUT: 3810 mL / NET: -2914 mL    26 Nov 2017 07:01  -  26 Nov 2017 13:16  --------------------------------------------------------  IN: 275.5 mL / OUT: 300 mL / NET: -24.5 mL          	    Cardiovascular: Normal S1 S2,     No JVD, 1/6 YUSUF murmur, Peripheral pulses palpable 2+ bilaterally  Respiratory: Lungs clear to auscultation, normal effort 	  Gastrointestinal:  Soft, Non-tender, + BS	  Extremities no edema, cyanosis, clubbing B/L LE's       TELEMETRY: 	    ECG:  	  RADIOLOGY:     DIAGNOSTIC TESTING:  [ ] Echocardiogram:[x] ECHO 11/25/17       EF 36%       < from: TTE with Doppler (w/Cont) (11.25.17 @ 12:33) >  CONCLUSIONS:  1. Mitral annular calcification, otherwise normal mitral  valve. Mild mitral regurgitation.  2. Normal left ventricular internal dimensions and wall  thicknesses.  3. Endocardium not well visualized; grossly moderate to  severe segmental left ventricularsystolic dysfunction.  Hypokinesis of the inferior, lateral, and inferolateral  walls.  Endocardial visualization enhanced with intravenous  injection of echo contrast (Definity).  No LV thrombus  seen.  4. The right ventricle is not well visualized;grossly  normal right ventricular systolic function.      [ ]  Catheterization: 11/17/17  < from: Cardiac Cath Lab - Adult (11.17.17 @ 11:48) >  VENTRICLES: No LV gram was performed; however, a recent echocardiogram  demonstrated normal global and regional LV function.  CORONARY VESSELS: The coronary circulation is right dominant.  LM:   --  LM: Angiography showed minor luminal irregularities with no flow  limiting lesions.  LAD:   --  Ostial LAD: There was a 100 % stenosis.  CX:   --  Circumflex: This vessel was not injected, but was visualized  during a prior cardiac catheterization.  RI:   --  Ramus intermedius: There was a 95 % stenosis.                   A drug-eluting stent was performed on the 95 % lesion in the  ramus intermedius. Following intervention there was an excellent  angiographic appearance with a 1 % residual stenosis.    RCA:   --  RCA: This vessel was not injected.  COMPLICATIONS: There were no complications.    < end of copied text >    [ ] Stress Test:    OTHER: 	      ASSESSMENT/PLAN: 	71y Female w/ PMH HTN, CKD, Hyperlipidemia, DM-II, CAD s/p 3V CABG  (LIMA to LAD, SVG to OM, SVG to PDA) at [facility] 2014, s/p stents presenting with shortness of breath. Symptoms started 3 days ago with dyspnea on exertion, associated with chest pain, progressing and worsening to dyspnea at rest. Endorses non-productive cough and chills. Denies  N/V/D/C, abd pain, fevers, sick exposure   In the ED pt with O2 sats in the 70s - given nitro and placed on Bipap. CXR showing pulmonary edema. EKG with  new intraventricular block and STD V3,V4,V5, Troponin 4.26. , MB 14.54. given ASA 162mg x1 and heparin gtt started. Also Noted to have Temp 100.5,  WBC 21.10, lactate 4.8.. Received vanc, cefepime, azithro .   Vital signs: max 100.5F, HR , -174/, SpO2 100% BiPAP on 40% FiO2.     Patient with recent admission to [facility] for staged CORNELIA to ramus intermedius, (90% stenosis) on 11/17/17 --Discharged on 11/20/17.  	    CXR w/ possible PNA treated w/ zithromax, maxipime and vanco x 1 in Ed     ID input appreciated observe off abx low suspicion of bacterial PNA   CAD w/ recent stent placement   c/w ASA Plavix statin   NSTEMI   elevating CE's   trend enzymes   on Hep gtt   will d/w Dr Ga re ischemic w/u when medically stable       noted Echo of 11/25/17  now  w/grossly moderate to severe segmental left ventricular systolic dysfunction. Hypokinesis of the inferior, lateral, and inferolateral walls.                was low normal LVS fx on Echo in September in office   HFrEF   currently on IV Lasix   monitor outpt K & cr levels   cont care as per ccu

## 2017-11-26 NOTE — PROGRESS NOTE ADULT - PROBLEM SELECTOR PLAN 3
Patient found to have glucose up to 400s with anion gap in setting of possible NSTEMI and decompensated HF.  -off insulin drip  -monitor anion gap  -Switched back to basal bolus when gap closes

## 2017-11-26 NOTE — PROGRESS NOTE ADULT - SUBJECTIVE AND OBJECTIVE BOX
CC: F/U for Fever    Saw/spoke to patient. Patient well. No fevers, no chills. No new events noted.    Allergies  No Known Allergies    ANTIMICROBIALS:  On cefepime over past 24 hours    PE:    Vital Signs Last 24 Hrs  T(C): 36.8 (2017 08:00), Max: 37.4 (2017 20:00)  T(F): 98.3 (2017 08:00), Max: 99.4 (2017 20:00)  HR: 93 (2017 09:) (89 - 108)  BP: 118/69 (:) (107/59 - 133/60)  BP(mean): 81 (:) (67 - 91)  RR: 28 (:) (19 - 31)  SpO2: 98% (:) (93% - 100%)    Gen: AOx3, NAD, non-toxic, pleasant  CV: S1+S2 normal, no murmurs, nontachycardic  Resp: Clear bilat, no resp distress, no crackles/wheezes  Abd: Soft, nontender, +BS  Ext: No LE edema, no wounds    LABS:                        11.5   13.30 )-----------( 335      ( 2017 05:00 )             34.7         137  |  100  |  44<H>  ----------------------------<  301<H>  4.6   |  18<L>  |  1.90<H>    Ca    8.6      2017 05:00  Phos  2.3       Mg     2.0         TPro  8.0  /  Alb  3.8  /  TBili  0.4  /  DBili  x   /  AST  82<H>  /  ALT  41<H>  /  AlkPhos  76      Urinalysis Basic - ( 2017 01:00 )    Color: PLYEL / Appearance: CLEAR / S.020 / pH: 6.0  Gluc: >1000 / Ketone: NEGATIVE  / Bili: NEGATIVE / Urobili: NORMAL E.U.   Blood: TRACE / Protein: 150 / Nitrite: NEGATIVE   Leuk Esterase: NEGATIVE / RBC: 2-5 / WBC 10-25   Sq Epi: OCC / Non Sq Epi: x / Bacteria: FEW    MICROBIOLOGY:  Vancomycin Level, Random: 9.6 ug/mL (17 @ 05:00)    URINE CATHETER  17 NGTD    BLOOD PERIPHERAL  17 NGTD    RADIOLOGY:     CXR:    Impression:  Portable chest dated  at 12:15 PM shows a significant resolution of   bibasilar and perihilar opacities suggestive of resolving pulmonary edema.  Decrease in trace right pleural effusion.  Status poststernotomy.   Heart and mediastinum cannot be evaluated on this projection.

## 2017-11-26 NOTE — PROGRESS NOTE ADULT - ASSESSMENT
71 F Nepali speaking female PMH HTN, CKD, Hyperlipidemia, DM-II, CAD s/p CABG, s/p stents presenting with shortness of breath. Patient had recent hospitalization with stent placement, now returns with chest pain, SOB. Never cough, no fevers, no sick contacts. Here had low grade fever to 100.5F, and leukocytosis (no left shift), elevated lactate and troponins. CXR with pulmonary edema. Lower suspicion for bacterial pneumonia. Fever/leukocytosis ? reactive to cardiac event. Has been on cefepime over past 24 hours, patient improved with decreasing leukocytosis, still does not appear as bacterial pneumonia. Cefepime presently DCed.  - Continue off abx  - If signs worsening, signs pna or sepsis would re-start Vanco/Cefepime empiric therapy  - F/U pending cultures  - ACS as per cardiology team    Dell Angeles MD  Pager [phone]  After 5pm and on weekends call [phone]

## 2017-11-26 NOTE — CONSULT NOTE ADULT - ASSESSMENT
1.	MERVIN, likely due to reduced Cardiac output in the setting of CHF. This is improving as the volume is offloaded with diuresis.  2.	CKD, stable.  3.	Chest pain  rule out ACS.  4.	CHF decompensation.  5.	Metabolic Acidosis, likely tubulo-interstitial disease.    PLAN:  1.	Continue lasix IVP.  2.	Follow up BMP.  3.	If Acidosis does not improve start NaHCO3 tabs.  4.	Random urine protein/creatinine.

## 2017-11-26 NOTE — PROGRESS NOTE ADULT - PROBLEM SELECTOR PLAN 8
-furosemide gtt @5 mg/ hr  -good urine out   -consider adding vasodilators when infectious free  -Strict I/Os and standing daily weights -furosemide gtt @5 mg/ hr change to lasix 40mg IVP BID  -good urine out   -consider adding vasodilators when infectious free  -Strict I/Os and standing daily weights  -d/c polo

## 2017-11-26 NOTE — CONSULT NOTE ADULT - SUBJECTIVE AND OBJECTIVE BOX
SELVIN CLAIRE  Patient is a 71y old  Female who presents with a chief complaint of sob,chest pain     HPI:  71F Latvian speaking female PMH HTN, CKD, Hyperlipidemia, DM-II, CAD s/p CABG, s/p stents presenting with shortness of breath. Symptoms started 3 days ago with dyspnea on exertion, associated with chest pain, progressing and worsening to dyspnea at rest. Endorses non-productive cough and chills. Denies  N/V/D/C, abd pain, fevers, sick exposure   In the ED pt with O2 sats in the 70s - given nitro and placed on Bipap. CXR showing pulmonary edema. She was admitted to CCU and was started on diuretics and heparin. She has CKD and folllows with Dr Muhammad. She feels better this am.    Patient with recent admission to [facility] for staged CORNELIA to ramus intermedius, (90% stenosis) Discharged on 17. (2017 03:04)    PAST MEDICAL & SURGICAL HISTORY:  GERD (gastroesophageal reflux disease)  CAD (coronary artery disease): s/p CABG  Hypothyroidism  Hyperlipidemia  Diabetes mellitus, type 2  S/P CABG (coronary artery bypass graft)    MEDICATIONS  (STANDING):  aspirin enteric coated 81 milliGRAM(s) Oral daily  chlorhexidine 4% Liquid 1 Application(s) Topical daily  clopidogrel Tablet 75 milliGRAM(s) Oral daily  dextrose 5%. 1000 milliLiter(s) (50 mL/Hr) IV Continuous <Continuous>  dextrose 50% Injectable 12.5 Gram(s) IV Push once  dextrose 50% Injectable 25 Gram(s) IV Push once  dextrose 50% Injectable 25 Gram(s) IV Push once  docusate sodium 100 milliGRAM(s) Oral three times a day  furosemide   Injectable 40 milliGRAM(s) IV Push every 12 hours  heparin  Infusion 700 Unit(s)/Hr (7 mL/Hr) IV Continuous <Continuous>  insulin glargine Injectable (LANTUS) 60 Unit(s) SubCutaneous at bedtime  insulin lispro (HumaLOG) corrective regimen sliding scale   SubCutaneous three times a day before meals  insulin lispro (HumaLOG) corrective regimen sliding scale   SubCutaneous at bedtime  insulin lispro Injectable (HumaLOG) 20 Unit(s) SubCutaneous three times a day before meals  levothyroxine 50 MICROGram(s) Oral daily  montelukast 10 milliGRAM(s) Oral at bedtime  pantoprazole    Tablet 40 milliGRAM(s) Oral before breakfast  senna 2 Tablet(s) Oral at bedtime    Allergies    No Known Allergies    Intolerances      FAMILY HISTORY:  No pertinent family history in first degree relatives      REVIEW OF SYSTEMS    General:  No fever, chills or night sweats.    Ophthalmologic: No changes in vision.  	  ENMT: No difficulty swallowing. 	    Respiratory and Thorax: Dyspnea on exertion.  	  Cardiovascular: No chest pains, tiredness or palpitations.	    Gastrointestinal: No dyspepsia, constipation or diarrhea.	    Genitourinary:	 No dysuria, hematuria or frequency.    Musculoskeletal: No joint pains or swelling. No muscle pains.    Neurological:	No weakness or numbness. No seizures.    Hematology/Lymphatics: No heat or cold intolerance.    Endocrine: No polyuria or polydipsia.	      Vital Signs Last 24 Hrs  T(C): 36.8 (2017 08:00), Max: 37.4 (2017 20:00)  T(F): 98.3 (2017 08:00), Max: 99.4 (2017 20:00)  HR: 89 (2017 11:00) (89 - 108)  BP: 130/63 (2017 11:00) (107/59 - 133/60)  BP(mean): 80 (2017 11:00) (67 - 91)  RR: 23 (2017 11:00) (19 - 31)  SpO2: 99% (2017 11:00) (93% - 100%)    PHYSICAL EXAMINATION:  Constitutional: She  appears comfortable and not distressed. Not diaphoretic.    Neck:  The thyroid is normal. Trachea is midline.     Respiratory: The lungs show bibasal creps to auscultation. No dullness and expansion is normal.    Cardiovascular: S1 and S2 are normal. No mummurs, rubs or gallops are present.    Gastrointestinal: The abdomen is soft. No tenderness is present. No masses are present. Bowel sounds are normal.    Genitourinary: The bladder is not distended. No CVA tenderness is present.    Extremities: No edema is noted. No deformities are present.    Neurological: Cognition is normal. Tone, power and sensation are normal. Gait is steady.    Skin: No leasions are seen  or palpated.    Lymph Nodes: No lymphadenopathy is present.    Psychiatric: Mood is appropriate. No hallucinations or flight of ideas are noted.                            11.5   13.30 )-----------( 335      ( 2017 05:00 )             34.7         137  |  100  |  44<H>  ----------------------------<  301<H>  4.6   |  18<L>  |  1.90<H>    Ca    8.6      2017 05:00  Phos  2.3       Mg     2.0         TPro  8.0  /  Alb  3.8  /  TBili  0.4  /  DBili  x   /  AST  82<H>  /  ALT  41<H>  /  AlkPhos  76      LIVER FUNCTIONS - ( 2017 05:00 )  Alb: 3.8 g/dL / Pro: 8.0 g/dL / ALK PHOS: 76 u/L / ALT: 41 u/L / AST: 82 u/L / GGT: x           Urinalysis Basic - ( 2017 01:00 )    Color: PLYEL / Appearance: CLEAR / S.020 / pH: 6.0  Gluc: >1000 / Ketone: NEGATIVE  / Bili: NEGATIVE / Urobili: NORMAL E.U.   Blood: TRACE / Protein: 150 / Nitrite: NEGATIVE   Leuk Esterase: NEGATIVE / RBC: 2-5 / WBC 10-25   Sq Epi: OCC / Non Sq Epi: x / Bacteria: FEW      < from: US Kidney and Bladder (17 @ 13:25) >    FINDINGS:    Right kidney:  8.4 cm. No renal mass, hydronephrosis. There is evidence   of cortical scarring of both the upper and lower pole. There is an   echogenic focus of the midpole possibly representing a parenchymal   calcification measuring 8 x 4 x 8 mm.    Left kidney:  8.9 cm. No solid renal mass, hydronephrosis or calculi.   There is a cyst of the upper pole measuring 1.5 cm.    Urinary bladder: Within normal limits.      < end of copied text >      < from: Xray Chest 1 View AP- PORTABLE-Urgent (17 @ 12:51) >  Impression:  Portable chest dated  at 12:15 PM shows a significant resolution of   bibasilar and perihilar opacities suggestive of resolving pulmonary edema.  Decrease in trace right pleural effusion.  Status poststernotomy.   Heart and mediastinum cannot be evaluated on this projection.        < end of copied text >

## 2017-11-26 NOTE — CONSULT NOTE ADULT - ASSESSMENT
Assessment  DMT2: 71y Female with DM T2 with hyperglycemia on insulin, blood sugars running  high,  no hypoglycemia,  eating meals,  non compliant with low carb diet.  Hypothyroidism:  On synthroid 75 mcg po daily.  HTN: Controlled, On med.  CAD: On medications, monitored.

## 2017-11-26 NOTE — PROGRESS NOTE ADULT - PROBLEM SELECTOR PLAN 2
PNA?  with Temp 100.5, elevated WBC and lactate. CXR showing patchy opacities received antibx vanc, cefepime and azithro in ED, ID consulted.  -RVP, bcx sent  -will monitor off of antibx per ID and restart if temp  -currently on cefepime  IVPB 1000 milliGRAM(s) IV Intermittent daily will f/u with ID today PNA?  with Temp 100.5, elevated WBC and lactate. CXR showing patchy opacities received antibx vanc, cefepime and azithro in ED, ID consulted.  -RVP, bcx sent  -will monitor off of antibx per ID and restart if temp  -currently on cefepime  IVPB 1000 milliGRAM(s) IV Intermittent daily>>>d/c ABX as per ID

## 2017-11-27 ENCOUNTER — TRANSCRIPTION ENCOUNTER (OUTPATIENT)
Age: 72
End: 2017-11-27

## 2017-11-27 DIAGNOSIS — Z29.9 ENCOUNTER FOR PROPHYLACTIC MEASURES, UNSPECIFIED: ICD-10-CM

## 2017-11-27 DIAGNOSIS — K21.9 GASTRO-ESOPHAGEAL REFLUX DISEASE WITHOUT ESOPHAGITIS: ICD-10-CM

## 2017-11-27 DIAGNOSIS — E11.49 TYPE 2 DIABETES MELLITUS WITH OTHER DIABETIC NEUROLOGICAL COMPLICATION: ICD-10-CM

## 2017-11-27 DIAGNOSIS — N18.3 CHRONIC KIDNEY DISEASE, STAGE 3 (MODERATE): ICD-10-CM

## 2017-11-27 DIAGNOSIS — I50.20 UNSPECIFIED SYSTOLIC (CONGESTIVE) HEART FAILURE: ICD-10-CM

## 2017-11-27 LAB
APTT BLD: 124.2 SEC — CRITICAL HIGH (ref 27.5–37.4)
APTT BLD: 32.1 SEC — SIGNIFICANT CHANGE UP (ref 27.5–37.4)
APTT BLD: 33.3 SEC — SIGNIFICANT CHANGE UP (ref 27.5–37.4)
BASOPHILS # BLD AUTO: 0.03 K/UL — SIGNIFICANT CHANGE UP (ref 0–0.2)
BASOPHILS NFR BLD AUTO: 0.2 % — SIGNIFICANT CHANGE UP (ref 0–2)
BUN SERPL-MCNC: 45 MG/DL — HIGH (ref 7–23)
CALCIUM SERPL-MCNC: 9.2 MG/DL — SIGNIFICANT CHANGE UP (ref 8.4–10.5)
CHLORIDE SERPL-SCNC: 102 MMOL/L — SIGNIFICANT CHANGE UP (ref 98–107)
CK MB BLD-MCNC: 6.32 NG/ML — HIGH (ref 1–4.7)
CK MB BLD-MCNC: 7.82 NG/ML — HIGH (ref 1–4.7)
CK SERPL-CCNC: 258 U/L — HIGH (ref 25–170)
CK SERPL-CCNC: 315 U/L — HIGH (ref 25–170)
CO2 SERPL-SCNC: 26 MMOL/L — SIGNIFICANT CHANGE UP (ref 22–31)
CREAT SERPL-MCNC: 1.78 MG/DL — HIGH (ref 0.5–1.3)
EOSINOPHIL # BLD AUTO: 0.45 K/UL — SIGNIFICANT CHANGE UP (ref 0–0.5)
EOSINOPHIL NFR BLD AUTO: 3.4 % — SIGNIFICANT CHANGE UP (ref 0–6)
GLUCOSE BLDC GLUCOMTR-MCNC: 141 MG/DL — HIGH (ref 70–99)
GLUCOSE BLDC GLUCOMTR-MCNC: 211 MG/DL — HIGH (ref 70–99)
GLUCOSE BLDC GLUCOMTR-MCNC: 233 MG/DL — HIGH (ref 70–99)
GLUCOSE BLDC GLUCOMTR-MCNC: 393 MG/DL — HIGH (ref 70–99)
GLUCOSE BLDC GLUCOMTR-MCNC: 78 MG/DL — SIGNIFICANT CHANGE UP (ref 70–99)
GLUCOSE SERPL-MCNC: 109 MG/DL — HIGH (ref 70–99)
HCT VFR BLD CALC: 35.9 % — SIGNIFICANT CHANGE UP (ref 34.5–45)
HGB BLD-MCNC: 11.5 G/DL — SIGNIFICANT CHANGE UP (ref 11.5–15.5)
IMM GRANULOCYTES # BLD AUTO: 0.06 # — SIGNIFICANT CHANGE UP
IMM GRANULOCYTES NFR BLD AUTO: 0.5 % — SIGNIFICANT CHANGE UP (ref 0–1.5)
LYMPHOCYTES # BLD AUTO: 29.2 % — SIGNIFICANT CHANGE UP (ref 13–44)
LYMPHOCYTES # BLD AUTO: 3.89 K/UL — HIGH (ref 1–3.3)
MAGNESIUM SERPL-MCNC: 2.2 MG/DL — SIGNIFICANT CHANGE UP (ref 1.6–2.6)
MCHC RBC-ENTMCNC: 28.4 PG — SIGNIFICANT CHANGE UP (ref 27–34)
MCHC RBC-ENTMCNC: 32 % — SIGNIFICANT CHANGE UP (ref 32–36)
MCV RBC AUTO: 88.6 FL — SIGNIFICANT CHANGE UP (ref 80–100)
MONOCYTES # BLD AUTO: 1.21 K/UL — HIGH (ref 0–0.9)
MONOCYTES NFR BLD AUTO: 9.1 % — SIGNIFICANT CHANGE UP (ref 2–14)
NEUTROPHILS # BLD AUTO: 7.69 K/UL — HIGH (ref 1.8–7.4)
NEUTROPHILS NFR BLD AUTO: 57.6 % — SIGNIFICANT CHANGE UP (ref 43–77)
NRBC # FLD: 0 — SIGNIFICANT CHANGE UP
NT-PROBNP SERPL-SCNC: 1690 PG/ML — SIGNIFICANT CHANGE UP
PHOSPHATE SERPL-MCNC: 3 MG/DL — SIGNIFICANT CHANGE UP (ref 2.5–4.5)
PLATELET # BLD AUTO: 351 K/UL — SIGNIFICANT CHANGE UP (ref 150–400)
PMV BLD: 9 FL — SIGNIFICANT CHANGE UP (ref 7–13)
POTASSIUM SERPL-MCNC: 4.5 MMOL/L — SIGNIFICANT CHANGE UP (ref 3.5–5.3)
POTASSIUM SERPL-SCNC: 4.5 MMOL/L — SIGNIFICANT CHANGE UP (ref 3.5–5.3)
RBC # BLD: 4.05 M/UL — SIGNIFICANT CHANGE UP (ref 3.8–5.2)
RBC # FLD: 14.5 % — SIGNIFICANT CHANGE UP (ref 10.3–14.5)
SODIUM SERPL-SCNC: 142 MMOL/L — SIGNIFICANT CHANGE UP (ref 135–145)
TROPONIN T SERPL-MCNC: 6.75 NG/ML — HIGH (ref 0–0.06)
TROPONIN T SERPL-MCNC: 7.23 NG/ML — HIGH (ref 0–0.06)
WBC # BLD: 13.33 K/UL — HIGH (ref 3.8–10.5)
WBC # FLD AUTO: 13.33 K/UL — HIGH (ref 3.8–10.5)

## 2017-11-27 PROCEDURE — 99232 SBSQ HOSP IP/OBS MODERATE 35: CPT

## 2017-11-27 PROCEDURE — 71010: CPT | Mod: 26

## 2017-11-27 RX ORDER — SUCRALFATE 1 G
1 TABLET ORAL
Qty: 0 | Refills: 0 | Status: DISCONTINUED | OUTPATIENT
Start: 2017-11-27 | End: 2017-12-11

## 2017-11-27 RX ORDER — METOPROLOL TARTRATE 50 MG
12.5 TABLET ORAL DAILY
Qty: 0 | Refills: 0 | Status: DISCONTINUED | OUTPATIENT
Start: 2017-11-27 | End: 2017-12-11

## 2017-11-27 RX ORDER — HEPARIN SODIUM 5000 [USP'U]/ML
3500 INJECTION INTRAVENOUS; SUBCUTANEOUS ONCE
Qty: 0 | Refills: 0 | Status: COMPLETED | OUTPATIENT
Start: 2017-11-27 | End: 2017-11-28

## 2017-11-27 RX ORDER — INSULIN GLARGINE 100 [IU]/ML
55 INJECTION, SOLUTION SUBCUTANEOUS AT BEDTIME
Qty: 0 | Refills: 0 | Status: DISCONTINUED | OUTPATIENT
Start: 2017-11-27 | End: 2017-12-03

## 2017-11-27 RX ORDER — HEPARIN SODIUM 5000 [USP'U]/ML
3500 INJECTION INTRAVENOUS; SUBCUTANEOUS EVERY 6 HOURS
Qty: 0 | Refills: 0 | Status: DISCONTINUED | OUTPATIENT
Start: 2017-11-27 | End: 2017-11-29

## 2017-11-27 RX ORDER — PANTOPRAZOLE SODIUM 20 MG/1
40 TABLET, DELAYED RELEASE ORAL
Qty: 0 | Refills: 0 | Status: DISCONTINUED | OUTPATIENT
Start: 2017-11-27 | End: 2017-12-11

## 2017-11-27 RX ORDER — HEPARIN SODIUM 5000 [USP'U]/ML
500 INJECTION INTRAVENOUS; SUBCUTANEOUS
Qty: 25000 | Refills: 0 | Status: DISCONTINUED | OUTPATIENT
Start: 2017-11-27 | End: 2017-11-29

## 2017-11-27 RX ORDER — INSULIN LISPRO 100/ML
10 VIAL (ML) SUBCUTANEOUS
Qty: 0 | Refills: 0 | Status: DISCONTINUED | OUTPATIENT
Start: 2017-11-27 | End: 2017-11-28

## 2017-11-27 RX ORDER — POLYETHYLENE GLYCOL 3350 17 G/17G
17 POWDER, FOR SOLUTION ORAL ONCE
Qty: 0 | Refills: 0 | Status: COMPLETED | OUTPATIENT
Start: 2017-11-27 | End: 2017-11-27

## 2017-11-27 RX ADMIN — Medication 10 UNIT(S): at 18:35

## 2017-11-27 RX ADMIN — INSULIN GLARGINE 55 UNIT(S): 100 INJECTION, SOLUTION SUBCUTANEOUS at 22:47

## 2017-11-27 RX ADMIN — Medication 100 MILLIGRAM(S): at 22:19

## 2017-11-27 RX ADMIN — Medication 40 MILLIGRAM(S): at 05:46

## 2017-11-27 RX ADMIN — Medication 23 UNIT(S): at 13:09

## 2017-11-27 RX ADMIN — Medication 1 GRAM(S): at 18:44

## 2017-11-27 RX ADMIN — SENNA PLUS 2 TABLET(S): 8.6 TABLET ORAL at 22:19

## 2017-11-27 RX ADMIN — Medication 6: at 22:47

## 2017-11-27 RX ADMIN — Medication 12.5 MILLIGRAM(S): at 17:39

## 2017-11-27 RX ADMIN — MONTELUKAST 10 MILLIGRAM(S): 4 TABLET, CHEWABLE ORAL at 22:19

## 2017-11-27 RX ADMIN — Medication 50 MICROGRAM(S): at 05:46

## 2017-11-27 RX ADMIN — PANTOPRAZOLE SODIUM 40 MILLIGRAM(S): 20 TABLET, DELAYED RELEASE ORAL at 17:39

## 2017-11-27 RX ADMIN — Medication: at 09:36

## 2017-11-27 RX ADMIN — Medication 4: at 13:09

## 2017-11-27 RX ADMIN — Medication 23 UNIT(S): at 09:38

## 2017-11-27 RX ADMIN — POLYETHYLENE GLYCOL 3350 17 GRAM(S): 17 POWDER, FOR SOLUTION ORAL at 22:19

## 2017-11-27 RX ADMIN — CLOPIDOGREL BISULFATE 75 MILLIGRAM(S): 75 TABLET, FILM COATED ORAL at 13:08

## 2017-11-27 RX ADMIN — Medication 100 MILLIGRAM(S): at 13:09

## 2017-11-27 RX ADMIN — Medication 40 MILLIGRAM(S): at 17:40

## 2017-11-27 RX ADMIN — Medication 81 MILLIGRAM(S): at 13:09

## 2017-11-27 RX ADMIN — PANTOPRAZOLE SODIUM 40 MILLIGRAM(S): 20 TABLET, DELAYED RELEASE ORAL at 05:46

## 2017-11-27 RX ADMIN — Medication 100 MILLIGRAM(S): at 05:46

## 2017-11-27 NOTE — PROGRESS NOTE ADULT - SUBJECTIVE AND OBJECTIVE BOX
HPI / INTERVAL HISTORY:  Patient seen and examined at bedside.      OBJECTIVE:  Patient's heparin held due to supratherapeutic PTT.    VITAL SIGNS:  ICU Vital Signs Last 24 Hrs  T(C): 36.8 (27 Nov 2017 04:00), Max: 36.9 (26 Nov 2017 16:00)  T(F): 98.2 (27 Nov 2017 04:00), Max: 98.4 (26 Nov 2017 16:00)  HR: 84 (27 Nov 2017 07:00) (81 - 98)  BP: 125/73 (27 Nov 2017 07:00) (107/62 - 140/69)  BP(mean): 84 (27 Nov 2017 07:00) (69 - 85)  ABP: --  ABP(mean): --  RR: 21 (27 Nov 2017 07:00) (10 - 29)  SpO2: 97% (27 Nov 2017 07:00) (91% - 100%)        11-26 @ 07:01  -  11-27 @ 07:00  --------------------------------------------------------  IN: 648.5 mL / OUT: 2150 mL / NET: -1501.5 mL      CAPILLARY BLOOD GLUCOSE      POCT Blood Glucose.: 280 mg/dL (26 Nov 2017 22:11)      PHYSICAL EXAM:  Gen:   HEENT: NC/AT; PERRL, anicteric sclera  Neck: supple, no JVD  Resp: clear to ausculation B/L; no wheezes, rales or rhonchi  Cardiovasc: S1S2 normal; RRR; no murmurs, rubs or gallops  GI: soft, nondistended, nontender; +BS  Extr: warm, well-perfused, PT/DP pulses 2+ B/L; no LE edema  Skin: normal color and turgor  Neuro:     LABS:                        11.5   13.33 )-----------( 351      ( 27 Nov 2017 05:15 )             35.9     11-27    142  |  102  |  45<H>  ----------------------------<  109<H>  4.5   |  26  |  1.78<H>    Ca    9.2      27 Nov 2017 05:15  Phos  3.0     11-27  Mg     2.2     11-27    TPro  8.0  /  Alb  3.8  /  TBili  0.4  /  DBili  x   /  AST  82<H>  /  ALT  41<H>  /  AlkPhos  76  11-26    LIVER FUNCTIONS - ( 26 Nov 2017 05:00 )  Alb: 3.8 g/dL / Pro: 8.0 g/dL / ALK PHOS: 76 u/L / ALT: 41 u/L / AST: 82 u/L / GGT: x           PTT - ( 27 Nov 2017 05:15 )  PTT:124.2 SEC    CARDIAC MARKERS ( 27 Nov 2017 05:15 )  x     / 6.75 ng/mL / 315 u/L / 7.82 ng/mL / x      CARDIAC MARKERS ( 26 Nov 2017 05:00 )  x     / 5.77 ng/mL / 391 u/L / x     / x      CARDIAC MARKERS ( 25 Nov 2017 21:50 )  x     / 6.17 ng/mL / 391 u/L / 17.89 ng/mL / x      CARDIAC MARKERS ( 25 Nov 2017 14:25 )  x     / 5.17 ng/mL / 439 u/L / 23.67 ng/mL / x              RADIOLOGY & ADDITIONAL TESTS:       MEDICATIONS:  aspirin enteric coated 81 milliGRAM(s) Oral daily  chlorhexidine 4% Liquid 1 Application(s) Topical daily  clopidogrel Tablet 75 milliGRAM(s) Oral daily  dextrose 5%. 1000 milliLiter(s) IV Continuous <Continuous>  dextrose 50% Injectable 12.5 Gram(s) IV Push once  dextrose 50% Injectable 25 Gram(s) IV Push once  dextrose 50% Injectable 25 Gram(s) IV Push once  dextrose Gel 1 Dose(s) Oral once PRN  docusate sodium 100 milliGRAM(s) Oral three times a day  furosemide   Injectable 40 milliGRAM(s) IV Push every 12 hours  glucagon  Injectable 1 milliGRAM(s) IntraMuscular once PRN  glucagon  Injectable 1 milliGRAM(s) IntraMuscular once PRN  heparin  Infusion 700 Unit(s)/Hr IV Continuous <Continuous>  insulin glargine Injectable (LANTUS) 64 Unit(s) SubCutaneous at bedtime  insulin lispro (HumaLOG) corrective regimen sliding scale   SubCutaneous three times a day before meals  insulin lispro (HumaLOG) corrective regimen sliding scale   SubCutaneous at bedtime  insulin lispro Injectable (HumaLOG) 23 Unit(s) SubCutaneous three times a day before meals  levothyroxine 50 MICROGram(s) Oral daily  montelukast 10 milliGRAM(s) Oral at bedtime  pantoprazole    Tablet 40 milliGRAM(s) Oral before breakfast  senna 2 Tablet(s) Oral at bedtime      ALLERGIES:  No Known Allergies HPI / INTERVAL HISTORY:  Patient seen and examined at bedside.  Patient assessed with  #[id number] pacific .  Patient states that she is feeling better just has not had a BM in 5 days.   She states that she no longer has chest pain and does not have SOB.  She denies fever, chills, palpitations, N/V, diarrhea.    OBJECTIVE:  No acute events overnight.  Patient's heparin held due to supratherapeutic PTT.    VITAL SIGNS:  ICU Vital Signs Last 24 Hrs  T(C): 36.8 (27 Nov 2017 04:00), Max: 36.9 (26 Nov 2017 16:00)  T(F): 98.2 (27 Nov 2017 04:00), Max: 98.4 (26 Nov 2017 16:00)  HR: 84 (27 Nov 2017 07:00) (81 - 98)  BP: 125/73 (27 Nov 2017 07:00) (107/62 - 140/69)  BP(mean): 84 (27 Nov 2017 07:00) (69 - 85)  ABP: --  ABP(mean): --  RR: 21 (27 Nov 2017 07:00) (10 - 29)  SpO2: 97% (27 Nov 2017 07:00) (91% - 100%)        11-26 @ 07:01  -  11-27 @ 07:00  --------------------------------------------------------  IN: 648.5 mL / OUT: 2150 mL / NET: -1501.5 mL      CAPILLARY BLOOD GLUCOSE      POCT Blood Glucose.: 280 mg/dL (26 Nov 2017 22:11)      PHYSICAL EXAM:  Gen: In no acute distress.   HEENT: NC/AT; PERRL, anicteric sclera  Neck: large neck circumference. supple, no JVD  Resp: Lungs CTA bilaterally  Cardiovasc: S1S2 normal; RRR; no murmurs, rubs or gallops. Sternal scar, well healed.   GI: soft, nondistended, nontender; +BS  Extr: warm, well-perfused, PT/DP pulses 1+ B/L; no LE edema  Skin: normal color and turgor  Neuro: AOx4, non focal    LABS:                        11.5   13.33 )-----------( 351      ( 27 Nov 2017 05:15 )             35.9     11-27    142  |  102  |  45<H>  ----------------------------<  109<H>  4.5   |  26  |  1.78<H>    Ca    9.2      27 Nov 2017 05:15  Phos  3.0     11-27  Mg     2.2     11-27    TPro  8.0  /  Alb  3.8  /  TBili  0.4  /  DBili  x   /  AST  82<H>  /  ALT  41<H>  /  AlkPhos  76  11-26    LIVER FUNCTIONS - ( 26 Nov 2017 05:00 )  Alb: 3.8 g/dL / Pro: 8.0 g/dL / ALK PHOS: 76 u/L / ALT: 41 u/L / AST: 82 u/L / GGT: x           PTT - ( 27 Nov 2017 05:15 )  PTT:124.2 SEC    CARDIAC MARKERS ( 27 Nov 2017 05:15 )  x     / 6.75 ng/mL / 315 u/L / 7.82 ng/mL / x      CARDIAC MARKERS ( 26 Nov 2017 05:00 )  x     / 5.77 ng/mL / 391 u/L / x     / x      CARDIAC MARKERS ( 25 Nov 2017 21:50 )  x     / 6.17 ng/mL / 391 u/L / 17.89 ng/mL / x      CARDIAC MARKERS ( 25 Nov 2017 14:25 )  x     / 5.17 ng/mL / 439 u/L / 23.67 ng/mL / x              RADIOLOGY & ADDITIONAL TESTS:     < from: TTE with Doppler (w/Cont) (11.25.17 @ 12:33) >  CONCLUSIONS:  1. Mitral annular calcification, otherwise normal mitral  valve. Mild mitral regurgitation.  2. Normal left ventricular internal dimensions and wall  thicknesses.  3. Endocardium not well visualized; grossly moderate to  severe segmental left ventricularsystolic dysfunction.  Hypokinesis of the inferior, lateral, and inferolateral  walls.  Endocardial visualization enhanced with intravenous  injection of echo contrast (Definity).  No LV thrombus  seen.  4. The right ventricle is not well visualized;grossly  normal right ventricular systolic function.    < end of copied text >  < from: TTE with Doppler (w/Cont) (11.25.17 @ 12:33) >  Ejection Fraction (Miladichol): 36 %    < end of copied text >        MEDICATIONS:  aspirin enteric coated 81 milliGRAM(s) Oral daily  chlorhexidine 4% Liquid 1 Application(s) Topical daily  clopidogrel Tablet 75 milliGRAM(s) Oral daily  dextrose 5%. 1000 milliLiter(s) IV Continuous <Continuous>  dextrose 50% Injectable 12.5 Gram(s) IV Push once  dextrose 50% Injectable 25 Gram(s) IV Push once  dextrose 50% Injectable 25 Gram(s) IV Push once  dextrose Gel 1 Dose(s) Oral once PRN  docusate sodium 100 milliGRAM(s) Oral three times a day  furosemide   Injectable 40 milliGRAM(s) IV Push every 12 hours  glucagon  Injectable 1 milliGRAM(s) IntraMuscular once PRN  glucagon  Injectable 1 milliGRAM(s) IntraMuscular once PRN  heparin  Infusion 700 Unit(s)/Hr IV Continuous <Continuous>  insulin glargine Injectable (LANTUS) 64 Unit(s) SubCutaneous at bedtime  insulin lispro (HumaLOG) corrective regimen sliding scale   SubCutaneous three times a day before meals  insulin lispro (HumaLOG) corrective regimen sliding scale   SubCutaneous at bedtime  insulin lispro Injectable (HumaLOG) 23 Unit(s) SubCutaneous three times a day before meals  levothyroxine 50 MICROGram(s) Oral daily  montelukast 10 milliGRAM(s) Oral at bedtime  pantoprazole    Tablet 40 milliGRAM(s) Oral before breakfast  senna 2 Tablet(s) Oral at bedtime      ALLERGIES:  No Known Allergies HPI / INTERVAL HISTORY:  Patient seen and examined at bedside.  Patient assessed with  #[id number] pacific .  Patient states that she is feeling better just has not had a BM in 5 days.   She states that she no longer has chest pain and does not have SOB.  She denies fever, chills, palpitations, N/V, diarrhea.    OBJECTIVE:  No acute events overnight.  Patient's heparin held due to supratherapeutic PTT.    VITAL SIGNS:  ICU Vital Signs Last 24 Hrs  T(C): 36.8 (27 Nov 2017 04:00), Max: 36.9 (26 Nov 2017 16:00)  T(F): 98.2 (27 Nov 2017 04:00), Max: 98.4 (26 Nov 2017 16:00)  HR: 84 (27 Nov 2017 07:00) (81 - 98)  BP: 125/73 (27 Nov 2017 07:00) (107/62 - 140/69)  BP(mean): 84 (27 Nov 2017 07:00) (69 - 85)  ABP: --  ABP(mean): --  RR: 21 (27 Nov 2017 07:00) (10 - 29)  SpO2: 97% (27 Nov 2017 07:00) (91% - 100%)        11-26 @ 07:01  -  11-27 @ 07:00  --------------------------------------------------------  IN: 648.5 mL / OUT: 2150 mL / NET: -1501.5 mL      CAPILLARY BLOOD GLUCOSE      POCT Blood Glucose.: 280 mg/dL (26 Nov 2017 22:11)      PHYSICAL EXAM:  Gen: In no acute distress.   HEENT: NC/AT; PERRL, anicteric sclera  Neck: large neck circumference. supple, no JVD  Resp: Mild inspiratory crackles posterior lobes.  Cardiovasc: S1S2 normal; RRR; no murmurs, rubs or gallops. Sternal scar, well healed.   GI: soft, nondistended, nontender; +BS  Extr: warm, well-perfused, PT/DP pulses 1+ B/L; no LE edema  Skin: normal color and turgor  Neuro: AOx4, non focal    LABS:                        11.5   13.33 )-----------( 351      ( 27 Nov 2017 05:15 )             35.9     11-27    142  |  102  |  45<H>  ----------------------------<  109<H>  4.5   |  26  |  1.78<H>    Ca    9.2      27 Nov 2017 05:15  Phos  3.0     11-27  Mg     2.2     11-27    TPro  8.0  /  Alb  3.8  /  TBili  0.4  /  DBili  x   /  AST  82<H>  /  ALT  41<H>  /  AlkPhos  76  11-26    LIVER FUNCTIONS - ( 26 Nov 2017 05:00 )  Alb: 3.8 g/dL / Pro: 8.0 g/dL / ALK PHOS: 76 u/L / ALT: 41 u/L / AST: 82 u/L / GGT: x           PTT - ( 27 Nov 2017 05:15 )  PTT:124.2 SEC    CARDIAC MARKERS ( 27 Nov 2017 05:15 )  x     / 6.75 ng/mL / 315 u/L / 7.82 ng/mL / x      CARDIAC MARKERS ( 26 Nov 2017 05:00 )  x     / 5.77 ng/mL / 391 u/L / x     / x      CARDIAC MARKERS ( 25 Nov 2017 21:50 )  x     / 6.17 ng/mL / 391 u/L / 17.89 ng/mL / x      CARDIAC MARKERS ( 25 Nov 2017 14:25 )  x     / 5.17 ng/mL / 439 u/L / 23.67 ng/mL / x              RADIOLOGY & ADDITIONAL TESTS:     < from: TTE with Doppler (w/Cont) (11.25.17 @ 12:33) >  CONCLUSIONS:  1. Mitral annular calcification, otherwise normal mitral  valve. Mild mitral regurgitation.  2. Normal left ventricular internal dimensions and wall  thicknesses.  3. Endocardium not well visualized; grossly moderate to  severe segmental left ventricularsystolic dysfunction.  Hypokinesis of the inferior, lateral, and inferolateral  walls.  Endocardial visualization enhanced with intravenous  injection of echo contrast (Definity).  No LV thrombus  seen.  4. The right ventricle is not well visualized;grossly  normal right ventricular systolic function.    < end of copied text >  < from: TTE with Doppler (w/Cont) (11.25.17 @ 12:33) >  Ejection Fraction (Miladichyasmine): 36 %    < end of copied text >        MEDICATIONS:  aspirin enteric coated 81 milliGRAM(s) Oral daily  chlorhexidine 4% Liquid 1 Application(s) Topical daily  clopidogrel Tablet 75 milliGRAM(s) Oral daily  dextrose 5%. 1000 milliLiter(s) IV Continuous <Continuous>  dextrose 50% Injectable 12.5 Gram(s) IV Push once  dextrose 50% Injectable 25 Gram(s) IV Push once  dextrose 50% Injectable 25 Gram(s) IV Push once  dextrose Gel 1 Dose(s) Oral once PRN  docusate sodium 100 milliGRAM(s) Oral three times a day  furosemide   Injectable 40 milliGRAM(s) IV Push every 12 hours  glucagon  Injectable 1 milliGRAM(s) IntraMuscular once PRN  glucagon  Injectable 1 milliGRAM(s) IntraMuscular once PRN  heparin  Infusion 700 Unit(s)/Hr IV Continuous <Continuous>  insulin glargine Injectable (LANTUS) 64 Unit(s) SubCutaneous at bedtime  insulin lispro (HumaLOG) corrective regimen sliding scale   SubCutaneous three times a day before meals  insulin lispro (HumaLOG) corrective regimen sliding scale   SubCutaneous at bedtime  insulin lispro Injectable (HumaLOG) 23 Unit(s) SubCutaneous three times a day before meals  levothyroxine 50 MICROGram(s) Oral daily  montelukast 10 milliGRAM(s) Oral at bedtime  pantoprazole    Tablet 40 milliGRAM(s) Oral before breakfast  senna 2 Tablet(s) Oral at bedtime      ALLERGIES:  No Known Allergies

## 2017-11-27 NOTE — PROGRESS NOTE ADULT - PROBLEM SELECTOR PROBLEM 4
Type 2 diabetes mellitus with hyperglycemia, unspecified long term insulin use status CAD (coronary artery disease) Systolic heart failure, unspecified heart failure chronicity

## 2017-11-27 NOTE — DISCHARGE NOTE ADULT - MEDICATION SUMMARY - MEDICATIONS TO CHANGE
I will SWITCH the dose or number of times a day I take the medications listed below when I get home from the hospital:    Apidra 100 units/mL subcutaneous solution  -- 26 microcurie subcutaneous with lunch and dinner    Lasix 20 mg oral tablet  -- 1 tab(s) by mouth once a day

## 2017-11-27 NOTE — CONSULT NOTE ADULT - PROBLEM SELECTOR PROBLEM 2
Type 2 diabetes mellitus with other neurologic complication, without long-term current use of insulin
CAD (coronary artery disease)

## 2017-11-27 NOTE — PROGRESS NOTE ADULT - SUBJECTIVE AND OBJECTIVE BOX
Subjective: SOB improved, no CP	    MEDICATIONS  (STANDING):  aspirin enteric coated 81 milliGRAM(s) Oral daily  chlorhexidine 4% Liquid 1 Application(s) Topical daily  clopidogrel Tablet 75 milliGRAM(s) Oral daily  docusate sodium 100 milliGRAM(s) Oral three times a day  furosemide   Injectable 40 milliGRAM(s) IV Push every 12 hours  insulin glargine Injectable (LANTUS) 64 Unit(s) SubCutaneous at bedtime  insulin lispro (HumaLOG) corrective regimen sliding scale   SubCutaneous three times a day before meals  insulin lispro (HumaLOG) corrective regimen sliding scale   SubCutaneous at bedtime  insulin lispro Injectable (HumaLOG) 23 Unit(s) SubCutaneous three times a day before meals  levothyroxine 50 MICROGram(s) Oral daily  metoprolol succinate ER 12.5 milliGRAM(s) Oral daily  montelukast 10 milliGRAM(s) Oral at bedtime  pantoprazole    Tablet 40 milliGRAM(s) Oral before breakfast  senna 2 Tablet(s) Oral at bedtime    LABS:                        11.5   13.33 )-----------( 351      ( 27 Nov 2017 05:15 )             35.9     Hemoglobin: 11.5 g/dL (11-27 @ 05:15)  Hemoglobin: 11.5 g/dL (11-26 @ 05:00)  Hemoglobin: 11.1 g/dL (11-25 @ 07:52)  Hemoglobin: 10.8 g/dL (11-25 @ 06:30)  Hemoglobin: 14.7 g/dL (11-25 @ 00:00)    11-27    142  |  102  |  45<H>  ----------------------------<  109<H>  4.5   |  26  |  1.78<H>    Ca    9.2      27 Nov 2017 05:15  Phos  3.0     11-27  Mg     2.2     11-27    TPro  8.0  /  Alb  3.8  /  TBili  0.4  /  DBili  x   /  AST  82<H>  /  ALT  41<H>  /  AlkPhos  76  11-26    Creatinine Trend: 1.78<--, 1.90<--, 1.89<--, 1.99<--, 2.16<--, 2.28<--   PTT - ( 27 Nov 2017 05:15 )  PTT:124.2 SEC  CARDIAC MARKERS ( 27 Nov 2017 05:15 )  x     / 6.75 ng/mL / 315 u/L / 7.82 ng/mL / x      CARDIAC MARKERS ( 26 Nov 2017 05:00 )  x     / 5.77 ng/mL / 391 u/L / x     / x      CARDIAC MARKERS ( 25 Nov 2017 21:50 )  x     / 6.17 ng/mL / 391 u/L / 17.89 ng/mL / x      CARDIAC MARKERS ( 25 Nov 2017 14:25 )  x     / 5.17 ng/mL / 439 u/L / 23.67 ng/mL / x      CARDIAC MARKERS ( 25 Nov 2017 06:30 )  x     / 4.35 ng/mL / 447 u/L / 25.04 ng/mL / x      CARDIAC MARKERS ( 25 Nov 2017 00:00 )  x     / 4.26 ng/mL / 420 u/L / 14.54 ng/mL / x            PHYSICAL EXAM  Vital Signs Last 24 Hrs  T(C): 36.9 (27 Nov 2017 09:00), Max: 36.9 (26 Nov 2017 16:00)  T(F): 98.4 (27 Nov 2017 09:00), Max: 98.4 (26 Nov 2017 16:00)  HR: 95 (27 Nov 2017 11:00) (81 - 98)  BP: 126/62 (27 Nov 2017 11:00) (107/62 - 140/69)  BP(mean): 77 (27 Nov 2017 11:00) (69 - 85)  RR: 26 (27 Nov 2017 11:00) (10 - 27)  SpO2: 95% (27 Nov 2017 11:00) (90% - 100%)      Cardiovascular: Normal S1 S2,     No JVD, 1/6 YUSUF murmur, Peripheral pulses palpable 2+ bilaterally  Respiratory: Lungs clear to auscultation, normal effort 	  Gastrointestinal:  Soft, Non-tender, + BS	  Extremities no edema, cyanosis, clubbing B/L LE's       DIAGNOSTIC TESTING:    < from: Cardiac Cath Lab - Adult (10.13.17 @ 10:40) >  VENTRICLES: No left ventriculogram was performed.  CORONARY VESSELS: The coronary circulation is right dominant.  LM:   --  LM: Normal.  LAD:   --  Proximal LAD: There was a diffuse 60 % stenosis.  --  Mid LAD: There was a 100 % stenosis with the distal vessel being filled  via LIMA-LAD.  CX:   --  Circumflex: The vessel was small sized. Angiography showed mild  atherosclerosis withno flow limiting lesions.  --  OM1: Angiography showed mild atherosclerosis with no flow limiting  lesions.  RI:   --  Ostial ramus intermedius: There was a tubular 80 % stenosis.  There was PARUL grade 3 flow through the vessel (brisk flow).  RCA:   --  Proximal RCA: Angiography showed mild atherosclerosis with no  flow limiting lesions.  --  Mid RCA: There was a 100 % stenosis with the distal vessel being filled  via collaterals. This lesion is a chronic total occlusion.  GRAFTS:   --  Graft to the LAD: The graft was a LIMA. Graft angiography  showed minor luminal irregularities. Distal vessel angiography showed  minor luminal irregularities.  AORTA: Ascending aorta: Normal. No additional grafts visualized in  aortogram.  COMPLICATIONS: There were no complications.  DIAGNOSTIC RECOMMENDATIONS: Coronary angiogram demonstrates patent  LIMA-LAD, closed SVG grafts, and a severe stenosis in the ostial Ramus.  Will perform staged PCI to RI when Cr stable and renally optimized.  Prepared and signed by  Corie Ga M.D.  Signed 10/17/2017 13:49:15    < end of copied text >    < from: Cardiac Cath Lab - Adult (11.17.17 @ 11:48) >  PROCEDURE:  --  Intervention on ramus intermedius: drug-eluting stent.    VENTRICLES: No LV gram was performed; however, a recent echocardiogram  demonstrated normal global and regional LV function.  CORONARY VESSELS: The coronary circulation is right dominant.  LM:   --  LM: Angiography showed minor luminal irregularities with no flow  limiting lesions.  LAD:   --  Ostial LAD: There was a 100 % stenosis.  CX:   --  Circumflex: This vessel was not injected, but was visualized  during a prior cardiac catheterization.  RI:   --  Ramus intermedius: There was a 95 % stenosis.  RCA:   --  RCA: This vessel was not injected.  COMPLICATIONS: There were no complications.  DIAGNOSTIC RECOMMENDATIONS: The patient should continue with the present  medications. will add plavix 75mg po once a day/ will add ECASA 325mg po  once day.  Prepared and signed by  Roberta Quick M.D.  Signed 11/17/2017 13:16:01    < end of copied text >    < from: TTE with Doppler (w/Cont) (11.25.17 @ 12:33) >  CONCLUSIONS:  1. Mitral annular calcification, otherwise normal mitral  valve. Mild mitral regurgitation.  2. Normal left ventricular internal dimensions and wall  thicknesses.  3. Endocardium not well visualized; grossly moderate to  severe segmental left ventricularsystolic dysfunction.  Hypokinesis of the inferior, lateral, and inferolateral  walls.  Endocardial visualization enhanced with intravenous  injection of echo contrast (Definity).  No LV thrombus  seen.  4. The right ventricle is not well visualized;grossly  normal right ventricular systolic function.  ------------------------------------------------------------------------  Confirmed on  11/25/2017 - 14:44:41 by Jose Lucia M.D.    < end of copied text >        ASSESSMENT/PLAN: 	71y Female w/ PMH HTN, CKD, Hyperlipidemia, DM-II, CAD s/p 3V CABG  (LIMA to LAD, SVG to OM, SVG to PDA) at [facility] 2014, s/P CORNELIA TO [place] 11/17/17 presenting with shortness of breath, cough, Hypoxic in ED with O2 sats in the 70s, fevers, Leukocytosis and elevated lactate.  CXR showed pulmonary edema in ED. EKG with new intraventricular block and STD V3,V4,V5, CE elevated c/w NSTEMI, given ASA 162mg x1 and started on heparin gtt. 	    --CXR w/ possible PNA treated w/ zithromax, maxipime and vanco x 1 in Ed   --ID input appreciated--observe off abx  --CAD s/p CORNELIA to RI 11/17/17. Cont ASA, Plavix.  ?statin   --NSTEMI and TTE with new moderate to severe LV dysfxn --cont to trend CE.  Cont Heparin Gtt. Plan for [facility] once stable.  --Decomp Acute systolic CHF-- Cont IV LASIX, O>I      Juliane Fitch PA-C  [facility]  [address], [address]   [place] [address]  office [phone]  pager [phone]

## 2017-11-27 NOTE — DISCHARGE NOTE ADULT - ADDITIONAL INSTRUCTIONS
Follow up with your PCP, cardiology and nephrology within 1-2 weeks; call for appointments.    Monitor cardiac catheterization site for signs of bleeding, increased bruising, swelling, or discharge. If you experience any of these symptoms, please follow up with your primary care physician or return to the hospital immediately. Do not submerge the site in water (bathe or swim). You may shower. No strenuous activity for 3 weeks. Do not drive for 48hrs following angiogram. Follow up with your PCP, cardiology and nephrology within 1-2 weeks; call for appointments.    You will need a repeat CT chest scan in 1 year to monitor your left upper lobe lung nodule.  Please arrange this with your PCP.     Monitor cardiac catheterization site for signs of bleeding, increased bruising, swelling, or discharge. If you experience any of these symptoms, please follow up with your primary care physician or return to the hospital immediately. Do not submerge the site in water (bathe or swim). You may shower. No strenuous activity for 3 weeks. Do not drive for 48hrs following angiogram.

## 2017-11-27 NOTE — PROGRESS NOTE ADULT - ASSESSMENT
Assessment  DMT2: 71y Female with DM T2 with hyperglycemia on insulin, blood sugars trending down. no hypoglycemia,  eating meals,  non compliant with low carb diet.  Hypothyroidism:  On synthroid 75 mcg po daily.  HTN: Controlled, On med.  CAD: On medications, monitored.

## 2017-11-27 NOTE — DISCHARGE NOTE ADULT - PATIENT PORTAL LINK FT
“You can access the [employer] Patient Portal, offered by [facility], by registering with the following website: [email or web address]”

## 2017-11-27 NOTE — DISCHARGE NOTE ADULT - SECONDARY DIAGNOSIS.
Systolic heart failure CAD (coronary artery disease) Diabetes mellitus, type 2 Hyperlipidemia Hypothyroidism Stage 3 chronic kidney disease Lung nodule seen on imaging study

## 2017-11-27 NOTE — PROGRESS NOTE ADULT - PROBLEM SELECTOR PLAN 1
likely due to decompensated HF vs cardiogenic shock vs NSTEMI. Initially requiring bilevel, now maintaining on NC. Signs of volume overload.  -enzymes q6  -TTE for assessment of LV function  -Strict I/Os and standing daily weights  -furosemide gtt @5 mg/ hr Currently chest pain free on DAPT and heparin drip.  -Possible cath when medically managed  - continue ASA 81 mg qd, clopidogrel 75mg qd  -Continue Atorvastatin 40 mg QD,  -Monitor PTT Currently chest pain free on DAPT and now s/p heparin drip  -Possible cath when medically managed  - continue ASA 81 mg qd, clopidogrel 75mg qd  -Continue Atorvastatin 40 mg QD,

## 2017-11-27 NOTE — CHART NOTE - NSCHARTNOTEFT_GEN_A_CORE
Patient admitted with NSTEMI transferred today from CCU > 4N telemetry floor - pt was on Heparin gtt for ACS awaiting Cardiac Cath   Dr Ga Cardiology note reviewed - plan to continue Heparin gtt   spoke with Tre ANAND - covering for Dr Ga confirmed to continue Hep gtt for NSTEMI   pt stable will continue to monitor      VITAL SIGNS:  Vital Signs Last 24 Hrs  T(C): 36.7 (27 Nov 2017 21:00), Max: 36.9 (27 Nov 2017 00:00)  T(F): 98 (27 Nov 2017 21:00), Max: 98.4 (27 Nov 2017 00:00)  HR: 107 (27 Nov 2017 21:00) (81 - 107)  BP: 105/63 (27 Nov 2017 21:00) (105/63 - 139/61)  BP(mean): 77 (27 Nov 2017 11:00) (69 - 84)  RR: 18 (27 Nov 2017 21:00) (16 - 26)  SpO2: 98% (27 Nov 2017 21:00) (90% - 100%)      No Known Allergies    MEDICATIONS  (STANDING):  aspirin enteric coated 81 milliGRAM(s) Oral daily  chlorhexidine 4% Liquid 1 Application(s) Topical daily  clopidogrel Tablet 75 milliGRAM(s) Oral daily  dextrose 5%. 1000 milliLiter(s) (50 mL/Hr) IV Continuous <Continuous>  dextrose 50% Injectable 12.5 Gram(s) IV Push once  dextrose 50% Injectable 25 Gram(s) IV Push once  dextrose 50% Injectable 25 Gram(s) IV Push once  docusate sodium 100 milliGRAM(s) Oral three times a day  furosemide   Injectable 40 milliGRAM(s) IV Push every 12 hours  heparin  Infusion. 500 Unit(s)/Hr (5 mL/Hr) IV Continuous <Continuous>  heparin  Injectable 3500 Unit(s) IV Push once  insulin glargine Injectable (LANTUS) 55 Unit(s) SubCutaneous at bedtime  insulin lispro (HumaLOG) corrective regimen sliding scale   SubCutaneous three times a day before meals  insulin lispro (HumaLOG) corrective regimen sliding scale   SubCutaneous at bedtime  insulin lispro Injectable (HumaLOG) 10 Unit(s) SubCutaneous three times a day before meals  levothyroxine 50 MICROGram(s) Oral daily  metoprolol succinate ER 12.5 milliGRAM(s) Oral daily  montelukast 10 milliGRAM(s) Oral at bedtime  pantoprazole    Tablet 40 milliGRAM(s) Oral two times a day before meals  senna 2 Tablet(s) Oral at bedtime  sucralfate suspension 1 Gram(s) Oral four times a day    MEDICATIONS  (PRN):  dextrose Gel 1 Dose(s) Oral once PRN Blood Glucose LESS THAN 70 milliGRAM(s)/deciliter  glucagon  Injectable 1 milliGRAM(s) IntraMuscular once PRN Glucose LESS THAN 70 milligrams/deciliter  glucagon  Injectable 1 milliGRAM(s) IntraMuscular once PRN Glucose LESS THAN 70 milligrams/deciliter  heparin  Injectable 3500 Unit(s) IV Push every 6 hours PRN For aPTT less than 40                            11.5   13.33 )-----------( 351      ( 27 Nov 2017 05:15 )             35.9     11-27    142  |  102  |  45<H>  ----------------------------<  109<H>  4.5   |  26  |  1.78<H>    Ca    9.2      27 Nov 2017 05:15  Phos  3.0     11-27  Mg     2.2     11-27    TPro  8.0  /  Alb  3.8  /  TBili  0.4  /  DBili  x   /  AST  82<H>  /  ALT  41<H>  /  AlkPhos  76  11-26    CARDIAC MARKERS ( 27 Nov 2017 16:00 )  x     / 7.23 ng/mL / 258 u/L / 6.32 ng/mL / x      CARDIAC MARKERS ( 27 Nov 2017 05:15 )  x     / 6.75 ng/mL / 315 u/L / 7.82 ng/mL / x      CARDIAC MARKERS ( 26 Nov 2017 05:00 )  x     / 5.77 ng/mL / 391 u/L / x     / x        CAPILLARY BLOOD GLUCOSE    POCT Blood Glucose.: 393 mg/dL (27 Nov 2017 22:44)  POCT Blood Glucose.: 78 mg/dL (27 Nov 2017 16:48)  POCT Blood Glucose.: 233 mg/dL (27 Nov 2017 12:15)  POCT Blood Glucose.: 211 mg/dL (27 Nov 2017 09:31)  POCT Blood Glucose.: 141 mg/dL (27 Nov 2017 08:07)

## 2017-11-27 NOTE — PROGRESS NOTE ADULT - PROBLEM SELECTOR PLAN 6
-Continue levothyroxine Afebrile with improving WBCs  -RVP, bcx sent  -will monitor off of antibx per ID and restart if temp Afebrile with improving WBCs.  -RVP negative and bcx negative to date.  -will monitor off of antibx per ID and restart if temp Patient with Cr ranging from 1.3-1.9 on previous visits. Good urine output on furosemide 40 IV BID. Metabolic acidosis improved.  -furosemide 40mg IV BID

## 2017-11-27 NOTE — CONSULT NOTE ADULT - PROBLEM SELECTOR RECOMMENDATION 2
strict glycemic control  appreciate endocrine input
Monitored and followed up by primary/cardiology team

## 2017-11-27 NOTE — PROGRESS NOTE ADULT - ASSESSMENT
1.	MERVIN, likely sec to cardio-renal syndrome. Renal function is improving with diuresis.  2.	CKD stage 3: Baseline Scr 1.5-1.8  3.	CHF decompensation: Getting diuresed. Follow up cadrdiology  4.	Metabolic Acidosis: Better    PLAN:  1.	Continue lasix IVP per cardiology  2.	Follow up renal function and electrolyte  3.	Planned for cardiac cath sometimes this week. Start Mucomyst 1200mg BID for 4 dose, starting one day prior to the cardiac cath. Also give her NaHCO3 150meq/L in sterile water for 200cc/hr for one hr prior to cardiac cath and 75cc/hr for 6 hrs after the cardiac cath

## 2017-11-27 NOTE — PROGRESS NOTE ADULT - ASSESSMENT
71 F Greenlandic speaking female PMH HTN, CKD, Hyperlipidemia, DM-II, CAD s/p CABG, s/p stents presenting with shortness of breath. Patient had recent hospitalization with stent placement, now returns with chest pain, SOB. Never cough, no fevers, no sick contacts. Here had low grade fever to 100.5F, and leukocytosis (no left shift), elevated lactate and troponins. CXR with pulmonary edema. Lower suspicion for bacterial pneumonia. Fever/leukocytosis ? reactive to cardiac event. Presently off abx. Leukocytosis stable, resp status improved, no fevers. Overall well. Continue off abx.  - Continue off abx  - If signs worsening, signs pna or sepsis would re-start Vanco/Cefepime empiric therapy  - F/U pending cultures  - ACS as per cardiology team    Dell Angeles MD  Pager [phone]  After 5pm and on weekends call [phone]

## 2017-11-27 NOTE — PROGRESS NOTE ADULT - ASSESSMENT
71F Slovenian speaking female PMH HTN, CKD, Hyperlipidemia, DM-II, CAD s/p CABG, s/p stents presenting with 3 days worsening shortness of breath. Patient with likely decompensated HF with elevate troponins likely from NSTEMI. Patient also found to have slight leukocytosis with fever and cough and consolidation on CXR initially started on antibx. Patient is currently chest pain free with increasing enzymes. 71F Ukrainian speaking female PMH HTN, CKD, Hyperlipidemia, DM-II, CAD s/p CABG, s/p stents presenting with 3 days worsening shortness of breath. Patient with likely decompensated HF with elevate troponins likely from NSTEMI. Patient is currently chest pain free with increasing enzymes.

## 2017-11-27 NOTE — DISCHARGE NOTE ADULT - CARE PLAN
Principal Discharge DX:	NSTEMI (non-ST elevated myocardial infarction)  Goal:	To maintain blood flow through the Coronary Arteries.  Instructions for follow-up, activity and diet:	Continue medications as prescribed including aspirin, brilinta, lipitor, and toprol.  Stop taking your plavix.  Low salt, low fat, low sugar diet.  Follow up with cardiology within 1-2 weeks.  Secondary Diagnosis:	Systolic heart failure  Instructions for follow-up, activity and diet:	Continue medications as prescribed including daily lasix.  Low salt, fluid restricted (less than 1.5 L/day) diet.  Monitor your daily morning weight.  Follow up with cardiology.  Secondary Diagnosis:	CAD (coronary artery disease)  Instructions for follow-up, activity and diet:	Continue medications as prescribed including toprol, aspirin, brilinta, and lipitor.  Low salt, low fat, low sugar diet.  Follow up with cardiology.  Secondary Diagnosis:	Diabetes mellitus, type 2  Instructions for follow-up, activity and diet:	Continue medications as prescribed.  Continue diet modification. Avoid complex carbohydrates such as bread, pasta, cereal, white rice, white potatoes, etc. Avoid concentrated sugar as found in desserts, candy, soda, juice, etc. Consume a diet based on lean protein (chicken, fish) and vegetables.   Monitor your fingersticks pre-meals and pre-bedtime.  Follow up with your PCP.  You should have yearly routine eye and foot examinations.  Check your HgA1C blood test every 3 months.  Secondary Diagnosis:	Hyperlipidemia  Instructions for follow-up, activity and diet:	Continue lipitor as prescribed.  Low fat diet.  Follow up with your PCP.  Secondary Diagnosis:	Hypothyroidism  Instructions for follow-up, activity and diet:	Continue synthroid as prescribed.  Follow up with your PCP.  Secondary Diagnosis:	Stage 3 chronic kidney disease  Instructions for follow-up, activity and diet:	Follow up with nephrology within 1-2 weeks for ongoing monitoring of your kidney function and electrolytes.  Continue medications as prescribed. Principal Discharge DX:	NSTEMI (non-ST elevated myocardial infarction)  Goal:	To maintain blood flow through the Coronary Arteries.  Instructions for follow-up, activity and diet:	Continue medications as prescribed including aspirin, brilinta, lipitor, and toprol.  Stop taking your plavix.  Low salt, low fat, low sugar diet.  Follow up with cardiology within 1-2 weeks.  Secondary Diagnosis:	Systolic heart failure  Instructions for follow-up, activity and diet:	Continue medications as prescribed including daily lasix.  Low salt, fluid restricted (less than 1.5 L/day) diet.  Monitor your daily morning weight.  Follow up with cardiology.  Secondary Diagnosis:	CAD (coronary artery disease)  Instructions for follow-up, activity and diet:	Continue medications as prescribed including toprol, aspirin, brilinta, and lipitor.  Low salt, low fat, low sugar diet.  Follow up with cardiology.  Secondary Diagnosis:	Diabetes mellitus, type 2  Instructions for follow-up, activity and diet:	Continue medications as prescribed.  Please note you will now be on Toujeo 65 units subcutaneously at bedtime and Apridra 24 8nits subcutaneously three times a day (prior to each meal).   Continue diet modification. Avoid complex carbohydrates such as bread, pasta, cereal, white rice, white potatoes, etc. Avoid concentrated sugar as found in desserts, candy, soda, juice, etc. Consume a diet based on lean protein (chicken, fish) and vegetables.   Monitor your fingersticks pre-meals and pre-bedtime.  Follow up with your PCP.  You should have yearly routine eye and foot examinations.  Check your HgA1C blood test every 3 months.  Secondary Diagnosis:	Hyperlipidemia  Instructions for follow-up, activity and diet:	Continue lipitor as prescribed.  Low fat diet.  Follow up with your PCP.  Secondary Diagnosis:	Hypothyroidism  Instructions for follow-up, activity and diet:	Continue synthroid as prescribed.  Follow up with your PCP.  Secondary Diagnosis:	Stage 3 chronic kidney disease  Instructions for follow-up, activity and diet:	Follow up with nephrology within 1-2 weeks for ongoing monitoring of your kidney function and electrolytes.  Continue medications as prescribed. Principal Discharge DX:	NSTEMI (non-ST elevated myocardial infarction)  Goal:	To maintain blood flow through the Coronary Arteries.  Instructions for follow-up, activity and diet:	Continue medications as prescribed including aspirin, Brilinta, lipitor, and toprol.  Stop taking your plavix.  Low salt, low fat, low sugar diet.  Follow up with cardiology within 1-2 weeks.  Secondary Diagnosis:	Systolic heart failure  Instructions for follow-up, activity and diet:	Continue medications as prescribed including daily lasix.  Low salt, fluid restricted (less than 1.5 L/day) diet.  Monitor your daily morning weight.  Follow up with cardiology.  Secondary Diagnosis:	CAD (coronary artery disease)  Instructions for follow-up, activity and diet:	Continue medications as prescribed including toprol, aspirin, Brilinta, and lipitor.  Low salt, low fat, low sugar diet.  Follow up with cardiology.  Secondary Diagnosis:	Diabetes mellitus, type 2  Instructions for follow-up, activity and diet:	Continue medications as prescribed.  Please note you will now be on Toujeo 65 units subcutaneously at bedtime and Apridra 24 units subcutaneously three times a day (prior to each meal).   Continue diet modification. Avoid complex carbohydrates such as bread, pasta, cereal, white rice, white potatoes, etc. Avoid concentrated sugar as found in desserts, candy, soda, juice, etc. Consume a diet based on lean protein (chicken, fish) and vegetables.   Monitor your fingersticks pre-meals and pre-bedtime.  Follow up with your PCP.  You should have yearly routine eye and foot examinations.  Check your HgA1C blood test every 3 months.  Secondary Diagnosis:	Hypothyroidism  Instructions for follow-up, activity and diet:	Continue synthroid as prescribed.   Follow up with your PCP.  Secondary Diagnosis:	Stage 3 chronic kidney disease  Instructions for follow-up, activity and diet:	Follow up with nephrology within 1-2 weeks for ongoing monitoring of your kidney function and electrolytes.  Continue medications as prescribed.  Secondary Diagnosis:	Lung nodule seen on imaging study  Instructions for follow-up, activity and diet:	You will need a repeat CT chest scan in 1 year to monitor your left upper lobe lung nodule.  Please arrange this with your PCP.

## 2017-11-27 NOTE — PROGRESS NOTE ADULT - PROBLEM SELECTOR PLAN 7
-Continue Atorvastatin -Continue levothyroxine Afebrile with improving WBCs.  -RVP negative and bcx negative to date.  -will monitor off of antibx per ID and restart if temp

## 2017-11-27 NOTE — PROGRESS NOTE ADULT - PROBLEM SELECTOR PLAN 8
-basal bolus after insulin drip  Consistent carbohydrate diet  continue statin therapy -Continue Atorvastatin -Continue levothyroxine

## 2017-11-27 NOTE — PROGRESS NOTE ADULT - PROBLEM SELECTOR PLAN 3
with Temp 100.5, elevated WBC and lactate. CXR showing patchy opacities received antibx vanc, cefepime and azithro in ED, ID consulted.  -RVP, bcx sent  -will monitor off of antibx per ID and restart if temp Switched to basal bolus with moderate SSI. Glucose ranging 200s.  -glargine 64U qhs, increased to 23 lispro TID

## 2017-11-27 NOTE — DISCHARGE NOTE ADULT - PLAN OF CARE
Continue medications as prescribed including aspirin, brilinta, lipitor, and toprol.  Stop taking your plavix.  Low salt, low fat, low sugar diet.  Follow up with cardiology within 1-2 weeks. Continue medications as prescribed including daily lasix.  Low salt, fluid restricted (less than 1.5 L/day) diet.  Monitor your daily morning weight.  Follow up with cardiology. Continue medications as prescribed including toprol, aspirin, brilinta, and lipitor.  Low salt, low fat, low sugar diet.  Follow up with cardiology. Continue medications as prescribed.  Continue diet modification. Avoid complex carbohydrates such as bread, pasta, cereal, white rice, white potatoes, etc. Avoid concentrated sugar as found in desserts, candy, soda, juice, etc. Consume a diet based on lean protein (chicken, fish) and vegetables.   Monitor your fingersticks pre-meals and pre-bedtime.  Follow up with your PCP.  You should have yearly routine eye and foot examinations.  Check your HgA1C blood test every 3 months. Continue lipitor as prescribed.  Low fat diet.  Follow up with your PCP. Continue synthroid as prescribed.  Follow up with your PCP. Follow up with nephrology within 1-2 weeks for ongoing monitoring of your kidney function and electrolytes.  Continue medications as prescribed. To maintain blood flow through the Coronary Arteries. Continue medications as prescribed.  Please note you will now be on Toujeo 65 units subcutaneously at bedtime and Apridra 24 8nits subcutaneously three times a day (prior to each meal).   Continue diet modification. Avoid complex carbohydrates such as bread, pasta, cereal, white rice, white potatoes, etc. Avoid concentrated sugar as found in desserts, candy, soda, juice, etc. Consume a diet based on lean protein (chicken, fish) and vegetables.   Monitor your fingersticks pre-meals and pre-bedtime.  Follow up with your PCP.  You should have yearly routine eye and foot examinations.  Check your HgA1C blood test every 3 months. Continue medications as prescribed including aspirin, Brilinta, lipitor, and toprol.  Stop taking your plavix.  Low salt, low fat, low sugar diet.  Follow up with cardiology within 1-2 weeks. Continue medications as prescribed including toprol, aspirin, Brilinta, and lipitor.  Low salt, low fat, low sugar diet.  Follow up with cardiology. Continue medications as prescribed.  Please note you will now be on Toujeo 65 units subcutaneously at bedtime and Apridra 24 units subcutaneously three times a day (prior to each meal).   Continue diet modification. Avoid complex carbohydrates such as bread, pasta, cereal, white rice, white potatoes, etc. Avoid concentrated sugar as found in desserts, candy, soda, juice, etc. Consume a diet based on lean protein (chicken, fish) and vegetables.   Monitor your fingersticks pre-meals and pre-bedtime.  Follow up with your PCP.  You should have yearly routine eye and foot examinations.  Check your HgA1C blood test every 3 months. Continue synthroid as prescribed.   Follow up with your PCP. You will need a repeat CT chest scan in 1 year to monitor your left upper lobe lung nodule.  Please arrange this with your PCP.

## 2017-11-27 NOTE — PROGRESS NOTE ADULT - PROBLEM SELECTOR PLAN 4
Patient found to have glucose up to 400s with anion gap in setting of possible NSTEMI and decompensated HF.  -Started on insulin drip  -monitor anion gap  -Will switch back to basal bolus when gap closes -continue ASA, clopidogrel, atorvastatin  -Hold BB in setting of ADHF  -DASH diet Likely due to CAD with EF of 36% and improved volume status s/p diuresis.   -Will start metoprolol succinated 12.5mg qd  -I+Os  -furosemide 40mg IV BID

## 2017-11-27 NOTE — PROGRESS NOTE ADULT - PROBLEM SELECTOR PLAN 9
-basal bolus after insulin drip  Consistent carbohydrate diet  continue statin therapy -on dual antiplatelet therapy -Continue Atorvastatin

## 2017-11-27 NOTE — PROGRESS NOTE ADULT - SUBJECTIVE AND OBJECTIVE BOX
CC: F/U for ? pna    Saw/spoke to patient. Overall appears well with improved respirations. No new complaints.    Allergies  No Known Allergies    ANTIMICROBIALS:  Off    PE:    Vital Signs Last 24 Hrs  T(C): 36.9 (27 Nov 2017 09:00), Max: 36.9 (26 Nov 2017 16:00)  T(F): 98.4 (27 Nov 2017 09:00), Max: 98.4 (26 Nov 2017 16:00)  HR: 95 (27 Nov 2017 11:00) (81 - 98)  BP: 126/62 (27 Nov 2017 11:00) (107/62 - 140/69)  BP(mean): 77 (27 Nov 2017 11:00) (69 - 85)  RR: 26 (27 Nov 2017 11:00) (10 - 27)  SpO2: 95% (27 Nov 2017 11:00) (90% - 100%)    Gen: AOx3, NAD, non-toxic, pleasant  CV: S1+S2 normal, no murmurs, nontachycardic  Resp: Clear bilat, no resp distress, no crackles/wheezes  Abd: Soft, nontender, +BS  Ext: No LE edema, no wounds    LABS:                        11.5   13.33 )-----------( 351      ( 27 Nov 2017 05:15 )             35.9     11-27    142  |  102  |  45<H>  ----------------------------<  109<H>  4.5   |  26  |  1.78<H>    Ca    9.2      27 Nov 2017 05:15  Phos  3.0     11-27  Mg     2.2     11-27    TPro  8.0  /  Alb  3.8  /  TBili  0.4  /  DBili  x   /  AST  82<H>  /  ALT  41<H>  /  AlkPhos  76  11-26    MICROBIOLOGY:    URINE CATHETER  11-25-17 NGTD    BLOOD PERIPHERAL  11-25-17 NGTD    RADIOLOGY:    No new available

## 2017-11-27 NOTE — PROGRESS NOTE ADULT - PROBLEM SELECTOR PLAN 2
Patient with elevated CE and STD with chest pain. Chest pain free at present.   PARUL score: 7; Loaded with  mg, clopidogrel 600 mg PO and start heparin gtt  -Possible cath when medically managed  - continue ASA 81 mg qd, clopidogrel 75mg qd  -Continue Atorvastatin 40 mg QD,    Serial EKG, PRN chest pain likely due to decompensated HF vs cardiogenic shock vs NSTEMI. Now significantly improved s/p diuresis with -1501.5 mL  -Strict I/Os and standing daily weights likely due to decompensated HF vs cardiogenic shock vs NSTEMI. Now significantly improved s/p diuresis with -1501.5 mL  -Strict I/Os and standing daily weights  -continue furosemide 40mg IV BID

## 2017-11-27 NOTE — DISCHARGE NOTE ADULT - HOSPITAL COURSE
71F Lao speaking female PMH HTN, CKD, Hyperlipidemia, DM-II, CAD s/p CABG, s/p stents presenting with worsening SOB,ZEE, and chest pain,x3 days. In the ED pt with O2 sats in the 70s, CXR showing pulmonary edema - given nitro and placed on Bipap and started on heparin drip for NSTEMI. Patient found to have elevated troponins which plateaued at 6.75 with decreasing CKMB. Patient's heparin was d/cd on 11/27 and continued on DAPT. The patient also had pulmonary edema likely due to decompensated HF that was initially treated with furosemide drip and was transitioned to furosemide 40mg IV BID with improvement of respiratory status and off of bilevel. She was medically managed for possible cath for NSTEMI. She was switched to metoprolol succinate due to decreased EF found on TTE.  She was also treated for possible HCAP with vanc, cefepime and azithromycin which was dcd on 11/26 as patient was asymptomatic with improving leukocytosis and negative bcx, negative RVP  The patient was also found to be hyperglycemic with anion gap and was started on insulin drip which improved the hyperglycemia on 11/25 and then switched to glargine 64 and lispro 23 TID and followed by endocrine. HPI:  71F German speaking female PMH HTN, CKD, Hyperlipidemia, DM-II, CAD s/p CABG, s/p stents presenting with shortness of breath. Symptoms started 3 days ago with dyspnea on exertion, associated with chest pain, progressing and worsening to dyspnea at rest. Endorses non-productive cough and chills. Denies  N/V/D/C, abd pain, fevers, sick exposure.  In the ED pt with O2 sats in the 70s - given nitro and placed on Bipap. CXR showing pulmonary edema. EKG with  new intraventricular block and STD V3,V4,V5, Troponin 4.26. , MB 14.54. given ASA 162mg x1 and heparin gtt started. Also Noted to have Temp 100.5,  WBC 21.10, lactate 4.8.. Received vanc, cefepime, azithro .  Vital signs: max 100.5F, HR , -174/, SpO2 100% BiPAP on 40% FiO2. Patient with recent admission to [facility] for staged CORNELIA to ramus intermedius, (90% stenosis) Discharged on 11/20/17. (25 Nov 2017 03:04)    On admission:  EKG: NSR @ 97, ST dep V3-5, T inv I, AVL  CE's: (CK: 410-->447-->434; MB: 14.57-->25.04-->23.67; Trop: 4.96-->4.35-->5.17)  WBC: 21.00              BCX neg                UCX neg               Bun/Cr: 43/2.18  Glucose: 407                  Ast/ALT: 94/45                BNP: 4,114  RVP neg                  Lactate: 4.8                UA - glucose > 1000, Trace blood  11/25 Echo - EF (Teicholtz): 36 %, Mitral annular calcification, otherwise normal mitral valve. Mild mitral regurgitation. Normal left ventricular internal dimensions and wall thicknesses. Endocardium not well visualized; grossly moderate to severe segmental left ventricular systolic dysfunction.Hypokinesis of the inferior, lateral, and inferolateral walls.  Endocardial visualization enhanced with intravenous injection of echo contrast (Definity).  No LV thrombus seen. The right ventricle is not well visualized; grossly normal right ventricular systolic function.  11/30 CXR - New minimal ill-defined right upper lung opacity which could be due to subsegmental atelectasis and or developing pneumonia. Suggest follow-up.  12/1 CT Chest - no acute Pulm Disease  12/7 Repeat CXR: clear lungs  12/8 CT chest: Left upper lobe 7 mm nodule unchanged from prior study. One-year follow-up CT recommended.  12/10: UA negative    71F German speaking female PMH HTN, CKD, Hyperlipidemia, DM-II, CAD s/p CABG, s/p stents presenting with worsening SOB,ZEE, and chest pain,x3 days. In the ED pt with O2 sats in the 70s, CXR showing pulmonary edema - given nitro and placed on Bipap and started on heparin drip for NSTEMI. Patient found to have elevated troponins which plateaued at 6.75 with decreasing CKMB. Patient's heparin was d/cd on 11/27 and continued on DAPT. The patient also had pulmonary edema likely due to decompensated HF that was initially treated with furosemide drip and was transitioned to furosemide 40mg IV BID with improvement of respiratory status and off of bilevel. Patient transitioned to lasix 40mg po qd on discharge.  She was medically managed for possible cath for NSTEMI. Patient underwent cardiac cath on 12/5: LVEDP 26, drug eluting stent Ramus 80 distal to prior stents that are patent; OM1 60,  and  known; RFA access.  Site checked and remained stable. She was switched to metoprolol succinate due to decreased EF found on TTE. Patient on asa and lipitor.  Home plavix was switched to brillinta.  Patient not on ACE/ARB due to CKD.  She was also treated for possible HCAP with vanc, cefepime and azithromycin which was dcd on 11/26 as patient was asymptomatic with improving leukocytosis and negative bcx, negative RVP. The patient was also found to be hyperglycemic with anion gap and was started on insulin drip which improved the hyperglycemia on 11/25 and then switched to glargine 64 and lispro 23 TID and followed by endocrine.  Endocrine adjusted insulin regiment throughout hospitalization.  Now on lantus 65units qhs and humalog 24u sq tid pre-meals.     Patient cleared for discharge home.  Outpatient follow up with cardiology, renal and PCP. HPI:  71F Faroese speaking female PMH HTN, CKD, Hyperlipidemia, DM-II, CAD s/p CABG, s/p stents presenting with shortness of breath. Symptoms started 3 days ago with dyspnea on exertion, associated with chest pain, progressing and worsening to dyspnea at rest. Endorses non-productive cough and chills. Denies  N/V/D/C, abd pain, fevers, sick exposure.  In the ED pt with O2 sats in the 70s - given nitro and placed on Bipap. CXR showing pulmonary edema. EKG with  new intraventricular block and STD V3,V4,V5, Troponin 4.26. , MB 14.54. given ASA 162mg x1 and heparin gtt started. Also Noted to have Temp 100.5,  WBC 21.10, lactate 4.8.. Received vanc, cefepime, azithro .  Vital signs: max 100.5F, HR , -174/, SpO2 100% BiPAP on 40% FiO2. Patient with recent admission to [facility] for staged CORNELIA to ramus intermedius, (90% stenosis) Discharged on 11/20/17. (25 Nov 2017 03:04)    On admission:  EKG: NSR @ 97, ST dep V3-5, T inv I, AVL  CE's: (CK: 410-->447-->434; MB: 14.57-->25.04-->23.67; Trop: 4.96-->4.35-->5.17)  WBC: 21.00              BCX neg                UCX neg               Bun/Cr: 43/2.18  Glucose: 407                  Ast/ALT: 94/45                BNP: 4,114  RVP neg                  Lactate: 4.8                UA - glucose > 1000, Trace blood  11/25 Echo - EF (Teicholtz): 36 %, Mitral annular calcification, otherwise normal mitral valve. Mild mitral regurgitation. Normal left ventricular internal dimensions and wall thicknesses. Endocardium not well visualized; grossly moderate to severe segmental left ventricular systolic dysfunction.Hypokinesis of the inferior, lateral, and inferolateral walls.  Endocardial visualization enhanced with intravenous injection of echo contrast (Definity).  No LV thrombus seen. The right ventricle is not well visualized; grossly normal right ventricular systolic function.  11/30 CXR - New minimal ill-defined right upper lung opacity which could be due to subsegmental atelectasis and or developing pneumonia. Suggest follow-up.  12/1 CT Chest - no acute Pulm Disease  12/7 Repeat CXR: clear lungs  12/8 CT chest: Left upper lobe 7 mm nodule unchanged from prior study. One-year follow-up CT recommended.  12/10: UA negative    71F Faroese speaking female PMH HTN, CKD, Hyperlipidemia, DM-II, CAD s/p CABG, s/p stents presenting with worsening SOB,ZEE, and chest pain,x3 days. In the ED pt with O2 sats in the 70s, CXR showing pulmonary edema - given nitro and placed on Bipap and started on heparin drip for NSTEMI. Patient found to have elevated troponins which plateaued at 6.75 with decreasing CKMB. Patient's heparin was d/cd on 11/27 and continued on DAPT. The patient also had pulmonary edema likely due to decompensated HF that was initially treated with furosemide drip and was transitioned to furosemide 40mg IV BID with improvement of respiratory status and off of bilevel. Patient transitioned to lasix 40mg po qd on discharge.  She was medically managed for possible cath for NSTEMI. Patient underwent cardiac cath on 12/5: LVEDP 26, drug eluting stent Ramus 80 distal to prior stents that are patent; OM1 60,  and  known; RFA access.  Site checked and remained stable. She was switched to metoprolol succinate due to decreased EF found on TTE. Patient on asa and lipitor.  Home plavix was switched to brillinta.  Patient not on ACE/ARB due to CKD.  She was also treated for possible HCAP with vanc, cefepime and azithromycin which was dcd on 11/26 as patient was asymptomatic with improving leukocytosis and negative bcx, negative RVP. The patient was also found to be hyperglycemic with anion gap and was started on insulin drip which improved the hyperglycemia on 11/25 and then switched to glargine 64 and lispro 23 TID and followed by endocrine.  Endocrine adjusted insulin regiment throughout hospitalization.  Now on lantus 65units qhs and humalog 24u sq tid pre-meals.  As per Dr. Banks, patient to be discharged on these doses.     Patient cleared for discharge home.  Outpatient follow up with cardiology, renal and PCP. HPI:  71F Yi speaking female PMH HTN, CKD, Hyperlipidemia, DM-II, CAD s/p CABG, s/p stents presenting with shortness of breath. Symptoms started 3 days ago with dyspnea on exertion, associated with chest pain, progressing and worsening to dyspnea at rest. Endorses non-productive cough and chills. Denies  N/V/D/C, abd pain, fevers, sick exposure.  In the ED pt with O2 sats in the 70s - given nitro and placed on Bipap. CXR showing pulmonary edema. EKG with  new intraventricular block and STD V3,V4,V5, Troponin 4.26. , MB 14.54. given ASA 162mg x1 and heparin gtt started. Also Noted to have Temp 100.5,  WBC 21.10, lactate 4.8.. Received vanc, cefepime, azithro .  Vital signs: max 100.5F, HR , -174/, SpO2 100% BiPAP on 40% FiO2. Patient with recent admission to [facility] for staged CORNELIA to ramus intermedius, (90% stenosis) Discharged on 11/20/17. (25 Nov 2017 03:04)    On admission:  EKG: NSR @ 97, ST dep V3-5, T inv I, AVL  CE's: (CK: 410-->447-->434; MB: 14.57-->25.04-->23.67; Trop: 4.96-->4.35-->5.17)  WBC: 21.00              BCX neg                UCX neg               Bun/Cr: 43/2.18  Glucose: 407                  Ast/ALT: 94/45                BNP: 4,114  RVP neg                  Lactate: 4.8                UA - glucose > 1000, Trace blood  11/25 Echo - EF (Teicholtz): 36 %, Mitral annular calcification, otherwise normal mitral valve. Mild mitral regurgitation. Normal left ventricular internal dimensions and wall thicknesses. Endocardium not well visualized; grossly moderate to severe segmental left ventricular systolic dysfunction.Hypokinesis of the inferior, lateral, and inferolateral walls.  Endocardial visualization enhanced with intravenous injection of echo contrast (Definity).  No LV thrombus seen. The right ventricle is not well visualized; grossly normal right ventricular systolic function.  11/30 CXR - New minimal ill-defined right upper lung opacity which could be due to subsegmental atelectasis and or developing pneumonia. Suggest follow-up.  12/1 CT Chest - no acute Pulm Disease  12/7 Repeat CXR: clear lungs  12/8 CT chest: Left upper lobe 7 mm nodule unchanged from prior study. One-year follow-up CT recommended.  12/10: UA negative    71F Yi speaking female PMH HTN, CKD, Hyperlipidemia, DM-II, CAD s/p CABG, s/p stents presenting with worsening SOB,ZEE, and chest pain,x3 days. In the ED pt with O2 sats in the 70s, CXR showing pulmonary edema - given nitro and placed on Bipap and started on heparin drip for NSTEMI. Patient found to have elevated troponins which plateaued at 6.75 with decreasing CKMB. Patient's heparin was d/cd on 11/27 and continued on DAPT. The patient also had pulmonary edema likely due to decompensated HF that was initially treated with furosemide drip and was transitioned to furosemide 40mg IV BID with improvement of respiratory status and off of bilevel. Patient transitioned to lasix 40mg po qd on discharge.  She was medically managed for possible cath for NSTEMI. Patient underwent cardiac cath on 12/5: LVEDP 26, drug eluting stent Ramus 80 distal to prior stents that are patent; OM1 60,  and  known; RFA access.  Site checked and remained stable. She was switched to metoprolol succinate due to decreased EF found on TTE. Patient on asa and lipitor.  Home plavix was switched to brillinta.  Patient not on ACE/ARB due to CKD.  She was also treated for possible HCAP with vanc, cefepime and azithromycin which was dcd on 11/26 as patient was asymptomatic with improving leukocytosis and negative bcx, negative RVP. The patient was also found to be hyperglycemic with anion gap and was started on insulin drip which improved the hyperglycemia on 11/25 and then switched to glargine 64 and lispro 23 TID and followed by endocrine.  Endocrine adjusted insulin regiment throughout hospitalization.  Now on lantus 65units qhs and humalog 24u sq tid pre-meals.  As per Dr. Banks, patient to be discharged on these doses.     PT: no needs    Patient cleared for discharge home.  Outpatient follow up with cardiology, renal and PCP.

## 2017-11-27 NOTE — PROGRESS NOTE ADULT - SUBJECTIVE AND OBJECTIVE BOX
Dr. Muhammad  Office [phone]  Cell [phone]  [facility]  Cell [phone]      Patient is a 71y old  Female who presents with a chief complaint of sob,chest pain (27 Nov 2017 11:05)      Patient seen and examined at bedside. No chest pain/sob, has some abd discomfort which she blames to constipation    VITALS:  T(F): 98.4 (11-27-17 @ 09:00), Max: 98.4 (11-27-17 @ 00:00)  HR: 95 (11-27-17 @ 11:00)  BP: 126/62 (11-27-17 @ 11:00)  RR: 26 (11-27-17 @ 11:00)  SpO2: 95% (11-27-17 @ 11:00)  Wt(kg): --    11-26 @ 07:01  -  11-27 @ 07:00  --------------------------------------------------------  IN: 648.5 mL / OUT: 2150 mL / NET: -1501.5 mL    11-27 @ 07:01  -  11-27 @ 17:04  --------------------------------------------------------  IN: 410 mL / OUT: 600 mL / NET: -190 mL        Weight (kg): 56.6 (11-27 @ 05:00)    PHYSICAL EXAM:  Constitutional: NAD  Neck: No JVD  Respiratory: CTAB, no wheezes, rales or rhonchi  Cardiovascular: S1, S2, RRR  Gastrointestinal: BS+, soft, NT/ND  Extremities: No peripheral edema    Hospital Medications:   MEDICATIONS  (STANDING):  aspirin enteric coated 81 milliGRAM(s) Oral daily  chlorhexidine 4% Liquid 1 Application(s) Topical daily  clopidogrel Tablet 75 milliGRAM(s) Oral daily  dextrose 5%. 1000 milliLiter(s) (50 mL/Hr) IV Continuous <Continuous>  dextrose 50% Injectable 12.5 Gram(s) IV Push once  dextrose 50% Injectable 25 Gram(s) IV Push once  dextrose 50% Injectable 25 Gram(s) IV Push once  docusate sodium 100 milliGRAM(s) Oral three times a day  furosemide   Injectable 40 milliGRAM(s) IV Push every 12 hours  insulin glargine Injectable (LANTUS) 64 Unit(s) SubCutaneous at bedtime  insulin lispro (HumaLOG) corrective regimen sliding scale   SubCutaneous three times a day before meals  insulin lispro (HumaLOG) corrective regimen sliding scale   SubCutaneous at bedtime  insulin lispro Injectable (HumaLOG) 10 Unit(s) SubCutaneous three times a day before meals  levothyroxine 50 MICROGram(s) Oral daily  metoprolol succinate ER 12.5 milliGRAM(s) Oral daily  montelukast 10 milliGRAM(s) Oral at bedtime  pantoprazole    Tablet 40 milliGRAM(s) Oral two times a day before meals  senna 2 Tablet(s) Oral at bedtime  sucralfate suspension 1 Gram(s) Oral four times a day      LABS:  11-27    142  |  102  |  45<H>  ----------------------------<  109<H>  4.5   |  26  |  1.78<H>    Ca    9.2      27 Nov 2017 05:15  Phos  3.0     11-27  Mg     2.2     11-27    TPro  8.0  /  Alb  3.8  /  TBili  0.4  /  DBili      /  AST  82<H>  /  ALT  41<H>  /  AlkPhos  76  11-26    Creatinine Trend: 1.78 <--, 1.90 <--, 1.89 <--, 1.99 <--, 2.16 <--, 2.28 <--    Phosphorus Level, Serum: 3.0 mg/dL (11-27 @ 05:15)                              11.5   13.33 )-----------( 351      ( 27 Nov 2017 05:15 )             35.9     Urine Studies:  Urinalysis - [11-25-17 @ 01:00]      Color PLYEL / Appearance CLEAR / SG 1.020 / pH 6.0      Gluc >1000 / Ketone NEGATIVE  / Bili NEGATIVE / Urobili NORMAL       Blood TRACE / Protein 150 / Leuk Est NEGATIVE / Nitrite NEGATIVE      RBC 2-5 / WBC 10-25 / Hyaline 2-5 / Gran  / Sq Epi OCC / Non Sq Epi  / Bacteria FEW      HbA1c 9.1      [11-25-17 @ 06:30]  TSH 0.79      [11-25-17 @ 06:30]  Lipid: chol 136, , HDL 37, LDL 80      [11-25-17 @ 06:30]        RADIOLOGY & ADDITIONAL STUDIES:

## 2017-11-27 NOTE — PROGRESS NOTE ADULT - PROBLEM SELECTOR PLAN 5
Problem: CAD (coronary artery disease).  Plan: continue ASA, Plavix. Lipitor  -Hold BB in setting of ADHF  -DASH diet Patient with Cr ranging from 1.3-1.9 on previous visits. Good urine output on furosemide 40 IV BID. Metabolic acidosis improved.  -bmp BID  -furosemide 40mg IV BID Patient with Cr ranging from 1.3-1.9 on previous visits. Good urine output on furosemide 40 IV BID. Metabolic acidosis improved.  -furosemide 40mg IV BID -continue ASA, clopidogrel, atorvastatin  -Hold BB in setting of ADHF  -DASH diet

## 2017-11-27 NOTE — DISCHARGE NOTE ADULT - CARE PROVIDER_API CALL
Corie Ga), Cardiovascular Disease; Internal Medicine; Interventional Cardiology  [address] [facility] [address]  [place] [address]  Phone: [phone]  Fax: [phone]    Bryant Muhammad (PJ), Internal Medicine; Nephrology  [address]  [address] [place] [address]  Phone: [phone]  Fax: [phone]

## 2017-11-27 NOTE — PROGRESS NOTE ADULT - PROBLEM SELECTOR PLAN 1
Will decrease Humalog to 10u before each meal, Decrease Lantus to 55u qhs.  Will continue monitoring FS, log, will Follow up.  Patient counseled for compliance with consistent low carb diet.

## 2017-11-27 NOTE — CONSULT NOTE ADULT - SUBJECTIVE AND OBJECTIVE BOX
HPI:  71F Macedonian speaking female PMH HTN, CKD, Hyperlipidemia, DM-II, CAD s/p CABG, s/p stents presenting with shortness of breath. Symptoms started 3 days ago with dyspnea on exertion, associated with chest pain, progressing and worsening to dyspnea at rest. Endorses non-productive cough and chills. Denies  N/V/D/C, abd pain, fevers, sick exposure   In the ED pt with O2 sats in the 70s - given nitro and placed on Bipap. CXR showing pulmonary edema. EKG with  new intraventricular block and STD V3,V4,V5, Troponin 4.26. , MB 14.54. given ASA 162mg x1 and heparin gtt started. Also Noted to have Temp 100.5,  WBC 21.10, lactate 4.8.. Received vanc, cefepime, azithro .   Vital signs: max 100.5F, HR , -174/, SpO2 100% BiPAP on 40% FiO2.     Patient with recent admission to [facility] for staged CORNELIA to ramus intermedius, (90% stenosis) Discharged on 11/20/17. (25 Nov 2017 03:04)  daughter and son in law at bedside, she had multiple episodes of nausea with some vomiting and decrease po intake    PAST MEDICAL & SURGICAL HISTORY:  GERD (gastroesophageal reflux disease)  CAD (coronary artery disease): s/p CABG  Hypothyroidism  Hyperlipidemia  Diabetes mellitus, type 2  S/P CABG (coronary artery bypass graft)      FAMILY HISTORY:  No pertinent family history in first degree relatives      Social History:    Outpatient Medications:    MEDICATIONS  (STANDING):  aspirin enteric coated 81 milliGRAM(s) Oral daily  chlorhexidine 4% Liquid 1 Application(s) Topical daily  clopidogrel Tablet 75 milliGRAM(s) Oral daily  dextrose 5%. 1000 milliLiter(s) (50 mL/Hr) IV Continuous <Continuous>  dextrose 50% Injectable 12.5 Gram(s) IV Push once  dextrose 50% Injectable 25 Gram(s) IV Push once  dextrose 50% Injectable 25 Gram(s) IV Push once  docusate sodium 100 milliGRAM(s) Oral three times a day  furosemide   Injectable 40 milliGRAM(s) IV Push every 12 hours  heparin  Infusion 700 Unit(s)/Hr (7 mL/Hr) IV Continuous <Continuous>  insulin glargine Injectable (LANTUS) 64 Unit(s) SubCutaneous at bedtime  insulin lispro (HumaLOG) corrective regimen sliding scale   SubCutaneous three times a day before meals  insulin lispro (HumaLOG) corrective regimen sliding scale   SubCutaneous at bedtime  insulin lispro Injectable (HumaLOG) 23 Unit(s) SubCutaneous three times a day before meals  levothyroxine 50 MICROGram(s) Oral daily  montelukast 10 milliGRAM(s) Oral at bedtime  pantoprazole    Tablet 40 milliGRAM(s) Oral before breakfast  senna 2 Tablet(s) Oral at bedtime    MEDICATIONS  (PRN):  dextrose Gel 1 Dose(s) Oral once PRN Blood Glucose LESS THAN 70 milliGRAM(s)/deciliter  glucagon  Injectable 1 milliGRAM(s) IntraMuscular once PRN Glucose LESS THAN 70 milligrams/deciliter  glucagon  Injectable 1 milliGRAM(s) IntraMuscular once PRN Glucose LESS THAN 70 milligrams/deciliter      Allergies    No Known Allergies    Intolerances      Review of Systems:  Constitutional: No fever, no chills  Eyes: No blurry vision  Neuro: No tremors  HEENT: No pain, no neck swelling  Cardiovascular: No chest pain, no palpitations  Respiratory: Has SOB, no cough  GI: No nausea, vomiting, abdominal pain  : No dysuria  Skin: no rash  MSK: Has leg swelling, no foot ulcers.  Psych: no depression  Endocrine: no polyuria, polydipsia    ALL OTHER SYSTEMS REVIEWED AND NEGATIVE    UNABLE TO OBTAIN    PHYSICAL EXAM:  VITALS: T(C): 36.8 (11-26-17 @ 20:00)  T(F): 98.2 (11-26-17 @ 20:00), Max: 99 (11-26-17 @ 04:00)  HR: 93 (11-26-17 @ 20:00) (89 - 108)  BP: 126/60 (11-26-17 @ 20:00) (107/59 - 140/69)  RR:  (17 - 29)  SpO2:  (93% - 100%)  Wt(kg): --  GENERAL: NAD, well-groomed, well-developed  EYES: No proptosis, no lid lag  HEENT:  Atraumatic, Normocephalic  THYROID: Normal size, no palpable nodules  RESPIRATORY: Clear to auscultation bilaterally; No rales, rhonchi, wheezing  CARDIOVASCULAR: Si S2, No murmurs;  GI: Soft, non distended, normal bowel sounds  SKIN: Dry, intact, No rashes or lesions  MUSCULOSKELETAL: Has BL lower extremity edema.  NEURO:  no tremor, sensation decreased in feet BL,                                11.5   13.30 )-----------( 335      ( 26 Nov 2017 05:00 )             34.7       11-26    137  |  100  |  44<H>  ----------------------------<  301<H>  4.6   |  18<L>  |  1.90<H>          11-25 Chol 136 LDL 80 HDL 37<L> Trig 144    Radiology:

## 2017-11-27 NOTE — CONSULT NOTE ADULT - PROBLEM SELECTOR RECOMMENDATION 9
change proton pump inhibitor to po bid  carafate 1g four times a day  if no improvement may consider gastric emptying study to rule out gastroparesis
Will increase Lantus to 64 units at bed time.  Will increase Humalog to 23 units before each meal in addition to Humalog correction scale coverage.  Patient counseled for compliance with consistent low carb diet.

## 2017-11-27 NOTE — PROGRESS NOTE ADULT - SUBJECTIVE AND OBJECTIVE BOX
Chief complaint  Patient is a 71y old  Female who presents with a chief complaint of sob,chest pain (27 Nov 2017 11:05)   Review of systems  Patient in bed, looks comfortable, no fever, no hypoglycemia.    Labs and Fingersticks    CAPILLARY BLOOD GLUCOSE        Calcium, Total Serum: 9.2 (11-27 @ 05:15)  Calcium, Total Serum: 8.6 (11-26 @ 05:00)  Calcium, Total Serum: 8.3 <L> (11-25 @ 21:50)  Albumin, Serum: 3.8 (11-26 @ 05:00)    Alanine Aminotransferase (ALT/SGPT): 41 <H> (11-26 @ 05:00)  Alkaline Phosphatase, Serum: 76 (11-26 @ 05:00)  Aspartate Aminotransferase (AST/SGOT): 82 <H> (11-26 @ 05:00)        11-27    142  |  102  |  45<H>  ----------------------------<  109<H>  4.5   |  26  |  1.78<H>    Ca    9.2      27 Nov 2017 05:15  Phos  3.0     11-27  Mg     2.2     11-27    TPro  8.0  /  Alb  3.8  /  TBili  0.4  /  DBili  x   /  AST  82<H>  /  ALT  41<H>  /  AlkPhos  76  11-26                        11.5   13.33 )-----------( 351      ( 27 Nov 2017 05:15 )             35.9     Medications  MEDICATIONS  (STANDING):  aspirin enteric coated 81 milliGRAM(s) Oral daily  chlorhexidine 4% Liquid 1 Application(s) Topical daily  clopidogrel Tablet 75 milliGRAM(s) Oral daily  dextrose 5%. 1000 milliLiter(s) (50 mL/Hr) IV Continuous <Continuous>  dextrose 50% Injectable 12.5 Gram(s) IV Push once  dextrose 50% Injectable 25 Gram(s) IV Push once  dextrose 50% Injectable 25 Gram(s) IV Push once  docusate sodium 100 milliGRAM(s) Oral three times a day  furosemide   Injectable 40 milliGRAM(s) IV Push every 12 hours  insulin glargine Injectable (LANTUS) 64 Unit(s) SubCutaneous at bedtime  insulin lispro (HumaLOG) corrective regimen sliding scale   SubCutaneous three times a day before meals  insulin lispro (HumaLOG) corrective regimen sliding scale   SubCutaneous at bedtime  insulin lispro Injectable (HumaLOG) 23 Unit(s) SubCutaneous three times a day before meals  levothyroxine 50 MICROGram(s) Oral daily  metoprolol succinate ER 12.5 milliGRAM(s) Oral daily  montelukast 10 milliGRAM(s) Oral at bedtime  pantoprazole    Tablet 40 milliGRAM(s) Oral two times a day before meals  senna 2 Tablet(s) Oral at bedtime  sucralfate suspension 1 Gram(s) Oral four times a day      Physical Exam  General: Patient comfortable in bed  Vital Signs Last 12 Hrs  T(F): 98.4 (11-27-17 @ 09:00), Max: 98.4 (11-27-17 @ 09:00)  HR: 95 (11-27-17 @ 11:00) (82 - 97)  BP: 126/62 (11-27-17 @ 11:00) (113/59 - 139/61)  BP(mean): 77 (11-27-17 @ 11:00) (73 - 84)  RR: 26 (11-27-17 @ 11:00) (18 - 26)  SpO2: 95% (11-27-17 @ 11:00) (90% - 99%)  Neck: No palpable thyroid nodules.  CVS: S1S2, No murmurs  Respiratory: No wheezing, no crepitations  GI: Abdomen soft, bowel sounds positive  Musculoskeletal: Positive edema lower extremities.   Skin: No skin rashes, no ecchymosis    Diagnostics

## 2017-11-27 NOTE — DISCHARGE NOTE ADULT - MEDICATION SUMMARY - MEDICATIONS TO TAKE
I will START or STAY ON the medications listed below when I get home from the hospital:    aspirin 81 mg oral delayed release tablet  -- 1 tab(s) by mouth once a day  -- Indication: For CAD (coronary artery disease)    Toujeo SoloStar 300 units/mL subcutaneous solution  -- 65 unit(s) subcutaneous once a day (at bedtime)  -- Indication: For Diabetes mellitus, type 2    Apidra 100 units/mL subcutaneous solution  -- 24 microcurie subcutaneous 3 times a day (before meals)  -- Indication: For Diabetes mellitus, type 2    atorvastatin 40 mg oral tablet  -- 1 tab(s) by mouth once a day  -- Indication: For High cholesterol    ticagrelor 90 mg oral tablet  -- 1 tab(s) by mouth 2 times a day    Meds to beds  Room [address] [place] [address]  Discharge: 12/11/17 at 5pm   Pager: [phone]  -- Indication: For CAD (coronary artery disease)    Toprol-XL 25 mg oral tablet, extended release  -- 0.5 tab(s) by mouth once a day    Meds to beds  Room [address] [place] [address]  Discharge: 12/11/17 at 5pm   Pager: [phone]  -- Indication: For CAD (coronary artery disease)    alendronate 70 mg oral tablet  -- 1 tab(s) by mouth once a week  -- Indication: For Bone health    furosemide 40 mg oral tablet  -- 1 tab(s) by mouth once a day    Meds to beds  Room [address] [place] [address]  Discharge: 12/11/17 at 5pm   Pager: [phone]  -- Indication: For Systolic heart failure    raNITIdine 150 mg oral capsule  -- 1 cap(s) by mouth 2 times a day  -- Indication: For GERD    montelukast 10 mg oral tablet  -- 1 tab(s) by mouth once a day (at bedtime)  -- Indication: For Asthma    levothyroxine 50 mcg (0.05 mg) oral tablet  -- 1 tab(s) by mouth once a day  -- Indication: For Hypothyroidism    Vol-Care Rx oral tablet  -- 1 tab(s) by mouth once a day  -- Indication: For Vitamin

## 2017-11-27 NOTE — DISCHARGE NOTE ADULT - MEDICATION SUMMARY - MEDICATIONS TO STOP TAKING
I will STOP taking the medications listed below when I get home from the hospital:    metoprolol tartrate 25 mg oral tablet  -- 1 tab(s) by mouth 2 times a day    clopidogrel 75 mg oral tablet  -- 1 tab(s) by mouth once a day

## 2017-11-27 NOTE — DISCHARGE NOTE ADULT - OTHER SIGNIFICANT FINDINGS
< from: TTE with Doppler (w/Cont) (11.25.17 @ 12:33) >  CONCLUSIONS:  1. Mitral annular calcification, otherwise normal mitral  valve. Mild mitral regurgitation.  2. Normal left ventricular internal dimensions and wall  thicknesses.  3. Endocardium not well visualized; grossly moderate to  severe segmental left ventricularsystolic dysfunction.  Hypokinesis of the inferior, lateral, and inferolateral  walls.  Endocardial visualization enhanced with intravenous  injection of echo contrast (Definity).  No LV thrombus  seen.  4. The right ventricle is not well visualized;grossly  normal right ventricular systolic function.    < end of copied text >  < from: TTE with Doppler (w/Cont) (11.25.17 @ 12:33) >    Ejection Fraction (Teicholtz): 36 %    < end of copied text > as above

## 2017-11-28 DIAGNOSIS — K59.01 SLOW TRANSIT CONSTIPATION: ICD-10-CM

## 2017-11-28 LAB
APTT BLD: 129.5 SEC — CRITICAL HIGH (ref 27.5–37.4)
APTT BLD: 37.4 SEC — SIGNIFICANT CHANGE UP (ref 27.5–37.4)
BUN SERPL-MCNC: 56 MG/DL — HIGH (ref 7–23)
CALCIUM SERPL-MCNC: 8.6 MG/DL — SIGNIFICANT CHANGE UP (ref 8.4–10.5)
CHLORIDE SERPL-SCNC: 94 MMOL/L — LOW (ref 98–107)
CK SERPL-CCNC: 284 U/L — HIGH (ref 25–170)
CO2 SERPL-SCNC: 21 MMOL/L — LOW (ref 22–31)
CREAT SERPL-MCNC: 2.33 MG/DL — HIGH (ref 0.5–1.3)
GLUCOSE BLDC GLUCOMTR-MCNC: 127 MG/DL — HIGH (ref 70–99)
GLUCOSE BLDC GLUCOMTR-MCNC: 160 MG/DL — HIGH (ref 70–99)
GLUCOSE BLDC GLUCOMTR-MCNC: 183 MG/DL — HIGH (ref 70–99)
GLUCOSE BLDC GLUCOMTR-MCNC: 193 MG/DL — HIGH (ref 70–99)
GLUCOSE BLDC GLUCOMTR-MCNC: 352 MG/DL — HIGH (ref 70–99)
GLUCOSE SERPL-MCNC: 321 MG/DL — HIGH (ref 70–99)
HCT VFR BLD CALC: 35.6 % — SIGNIFICANT CHANGE UP (ref 34.5–45)
HGB BLD-MCNC: 11.6 G/DL — SIGNIFICANT CHANGE UP (ref 11.5–15.5)
MAGNESIUM SERPL-MCNC: 2.1 MG/DL — SIGNIFICANT CHANGE UP (ref 1.6–2.6)
MCHC RBC-ENTMCNC: 28.4 PG — SIGNIFICANT CHANGE UP (ref 27–34)
MCHC RBC-ENTMCNC: 32.6 % — SIGNIFICANT CHANGE UP (ref 32–36)
MCV RBC AUTO: 87 FL — SIGNIFICANT CHANGE UP (ref 80–100)
NRBC # FLD: 0 — SIGNIFICANT CHANGE UP
PLATELET # BLD AUTO: 368 K/UL — SIGNIFICANT CHANGE UP (ref 150–400)
PMV BLD: 9.3 FL — SIGNIFICANT CHANGE UP (ref 7–13)
POTASSIUM SERPL-MCNC: 4.4 MMOL/L — SIGNIFICANT CHANGE UP (ref 3.5–5.3)
POTASSIUM SERPL-SCNC: 4.4 MMOL/L — SIGNIFICANT CHANGE UP (ref 3.5–5.3)
RBC # BLD: 4.09 M/UL — SIGNIFICANT CHANGE UP (ref 3.8–5.2)
RBC # FLD: 14.2 % — SIGNIFICANT CHANGE UP (ref 10.3–14.5)
SODIUM SERPL-SCNC: 134 MMOL/L — LOW (ref 135–145)
TROPONIN T SERPL-MCNC: 6.44 NG/ML — HIGH (ref 0–0.06)
WBC # BLD: 14.09 K/UL — HIGH (ref 3.8–10.5)
WBC # FLD AUTO: 14.09 K/UL — HIGH (ref 3.8–10.5)

## 2017-11-28 PROCEDURE — 99232 SBSQ HOSP IP/OBS MODERATE 35: CPT

## 2017-11-28 RX ORDER — POLYETHYLENE GLYCOL 3350 17 G/17G
17 POWDER, FOR SOLUTION ORAL DAILY
Qty: 0 | Refills: 0 | Status: DISCONTINUED | OUTPATIENT
Start: 2017-11-28 | End: 2017-12-11

## 2017-11-28 RX ORDER — FUROSEMIDE 40 MG
40 TABLET ORAL DAILY
Qty: 0 | Refills: 0 | Status: DISCONTINUED | OUTPATIENT
Start: 2017-11-29 | End: 2017-12-03

## 2017-11-28 RX ORDER — INSULIN LISPRO 100/ML
15 VIAL (ML) SUBCUTANEOUS
Qty: 0 | Refills: 0 | Status: DISCONTINUED | OUTPATIENT
Start: 2017-11-28 | End: 2017-12-01

## 2017-11-28 RX ADMIN — Medication 2: at 17:29

## 2017-11-28 RX ADMIN — INSULIN GLARGINE 55 UNIT(S): 100 INJECTION, SOLUTION SUBCUTANEOUS at 23:24

## 2017-11-28 RX ADMIN — Medication 40 MILLIGRAM(S): at 17:24

## 2017-11-28 RX ADMIN — Medication 100 MILLIGRAM(S): at 05:43

## 2017-11-28 RX ADMIN — HEPARIN SODIUM 500 UNIT(S)/HR: 5000 INJECTION INTRAVENOUS; SUBCUTANEOUS at 16:52

## 2017-11-28 RX ADMIN — POLYETHYLENE GLYCOL 3350 17 GRAM(S): 17 POWDER, FOR SOLUTION ORAL at 12:39

## 2017-11-28 RX ADMIN — Medication 50 MICROGRAM(S): at 05:43

## 2017-11-28 RX ADMIN — HEPARIN SODIUM 500 UNIT(S)/HR: 5000 INJECTION INTRAVENOUS; SUBCUTANEOUS at 00:16

## 2017-11-28 RX ADMIN — Medication 10: at 12:40

## 2017-11-28 RX ADMIN — Medication 2: at 09:00

## 2017-11-28 RX ADMIN — Medication 10 UNIT(S): at 12:40

## 2017-11-28 RX ADMIN — Medication 1 GRAM(S): at 00:18

## 2017-11-28 RX ADMIN — Medication 1 GRAM(S): at 17:24

## 2017-11-28 RX ADMIN — Medication 1 GRAM(S): at 12:39

## 2017-11-28 RX ADMIN — PANTOPRAZOLE SODIUM 40 MILLIGRAM(S): 20 TABLET, DELAYED RELEASE ORAL at 17:24

## 2017-11-28 RX ADMIN — Medication 1 GRAM(S): at 05:42

## 2017-11-28 RX ADMIN — HEPARIN SODIUM 3500 UNIT(S): 5000 INJECTION INTRAVENOUS; SUBCUTANEOUS at 00:16

## 2017-11-28 RX ADMIN — Medication 81 MILLIGRAM(S): at 12:39

## 2017-11-28 RX ADMIN — CLOPIDOGREL BISULFATE 75 MILLIGRAM(S): 75 TABLET, FILM COATED ORAL at 12:39

## 2017-11-28 RX ADMIN — Medication 10 MILLIGRAM(S): at 02:37

## 2017-11-28 RX ADMIN — SENNA PLUS 2 TABLET(S): 8.6 TABLET ORAL at 23:25

## 2017-11-28 RX ADMIN — Medication 10 UNIT(S): at 09:00

## 2017-11-28 RX ADMIN — Medication 15 UNIT(S): at 17:29

## 2017-11-28 RX ADMIN — PANTOPRAZOLE SODIUM 40 MILLIGRAM(S): 20 TABLET, DELAYED RELEASE ORAL at 05:43

## 2017-11-28 RX ADMIN — HEPARIN SODIUM 0 UNIT(S)/HR: 5000 INJECTION INTRAVENOUS; SUBCUTANEOUS at 07:44

## 2017-11-28 RX ADMIN — MONTELUKAST 10 MILLIGRAM(S): 4 TABLET, CHEWABLE ORAL at 23:25

## 2017-11-28 RX ADMIN — Medication 100 MILLIGRAM(S): at 23:26

## 2017-11-28 RX ADMIN — Medication 40 MILLIGRAM(S): at 05:42

## 2017-11-28 RX ADMIN — Medication 12.5 MILLIGRAM(S): at 05:42

## 2017-11-28 RX ADMIN — HEPARIN SODIUM 300 UNIT(S)/HR: 5000 INJECTION INTRAVENOUS; SUBCUTANEOUS at 09:00

## 2017-11-28 NOTE — PROGRESS NOTE ADULT - SUBJECTIVE AND OBJECTIVE BOX
Subjective: SOB improved, no CP	    MEDICATIONS  (STANDING):  aspirin enteric coated 81 milliGRAM(s) Oral daily  chlorhexidine 4% Liquid 1 Application(s) Topical daily  clopidogrel Tablet 75 milliGRAM(s) Oral daily  docusate sodium 100 milliGRAM(s) Oral three times a day  furosemide   Injectable 40 milliGRAM(s) IV Push every 12 hours  heparin  Infusion. 500 Unit(s)/Hr (5 mL/Hr) IV Continuous <Continuous>  insulin glargine Injectable (LANTUS) 55 Unit(s) SubCutaneous at bedtime  insulin lispro (HumaLOG) corrective regimen sliding scale   SubCutaneous three times a day before meals  insulin lispro (HumaLOG) corrective regimen sliding scale   SubCutaneous at bedtime  insulin lispro Injectable (HumaLOG) 10 Unit(s) SubCutaneous three times a day before meals  levothyroxine 50 MICROGram(s) Oral daily  metoprolol succinate ER 12.5 milliGRAM(s) Oral daily  montelukast 10 milliGRAM(s) Oral at bedtime  pantoprazole    Tablet 40 milliGRAM(s) Oral two times a day before meals  polyethylene glycol 3350 17 Gram(s) Oral daily  senna 2 Tablet(s) Oral at bedtime  sucralfate suspension 1 Gram(s) Oral four times a day    LABS:                        11.6   14.09 )-----------( 368      ( 28 Nov 2017 06:30 )             35.6     142  |  102  |  45<H>  ----------------------------<  109<H>  4.5   |  26  |  1.78<H>    Ca    9.2      27 Nov 2017 05:15  Phos  3.0     11-27  Mg     2.2     11-27    Creatinine Trend: 1.78<--, 1.90<--, 1.89<--, 1.99<--, 2.16<--, 2.28<--   PTT - ( 28 Nov 2017 06:30 )  PTT:129.5 SEC  CARDIAC MARKERS ( 27 Nov 2017 16:00 )  x     / 7.23 ng/mL / 258 u/L / 6.32 ng/mL / x      CARDIAC MARKERS ( 27 Nov 2017 05:15 )  x     / 6.75 ng/mL / 315 u/L / 7.82 ng/mL / x      CARDIAC MARKERS ( 26 Nov 2017 05:00 )  x     / 5.77 ng/mL / 391 u/L / x     / x      CARDIAC MARKERS ( 25 Nov 2017 21:50 )  x     / 6.17 ng/mL / 391 u/L / 17.89 ng/mL / x      CARDIAC MARKERS ( 25 Nov 2017 14:25 )  x     / 5.17 ng/mL / 439 u/L / 23.67 ng/mL / x          PHYSICAL EXAM  Vital Signs Last 24 Hrs  T(C): 36.8 (28 Nov 2017 05:04), Max: 36.8 (28 Nov 2017 05:04)  T(F): 98.3 (28 Nov 2017 05:04), Max: 98.3 (28 Nov 2017 05:04)  HR: 89 (28 Nov 2017 05:04) (89 - 107)  BP: 117/70 (28 Nov 2017 05:04) (105/63 - 117/70)  RR: 18 (28 Nov 2017 05:04) (18 - 18)  SpO2: 95% (28 Nov 2017 05:04) (95% - 98%)    Cardiovascular: Normal S1 S2,     No JVD, 1/6 YUSUF murmur, Peripheral pulses palpable 2+ bilaterally  Respiratory: Lungs clear to auscultation, normal effort 	  Gastrointestinal:  Soft, Non-tender, + BS	  Extremities no edema, cyanosis, clubbing B/L LE's     DIAGNOSTIC TESTING:    < from: Cardiac Cath Lab - Adult (10.13.17 @ 10:40) >  VENTRICLES: No left ventriculogram was performed.  CORONARY VESSELS: The coronary circulation is right dominant.  LM:   --  LM: Normal.  LAD:   --  Proximal LAD: There was a diffuse 60 % stenosis.  --  Mid LAD: There was a 100 % stenosis with the distal vessel being filled  via LIMA-LAD.  CX:   --  Circumflex: The vessel was small sized. Angiography showed mild  atherosclerosis withno flow limiting lesions.  --  OM1: Angiography showed mild atherosclerosis with no flow limiting  lesions.  RI:   --  Ostial ramus intermedius: There was a tubular 80 % stenosis.  There was PARUL grade 3 flow through the vessel (brisk flow).  RCA:   --  Proximal RCA: Angiography showed mild atherosclerosis with no  flow limiting lesions.  --  Mid RCA: There was a 100 % stenosis with the distal vessel being filled  via collaterals. This lesion is a chronic total occlusion.  GRAFTS:   --  Graft to the LAD: The graft was a LIMA. Graft angiography  showed minor luminal irregularities. Distal vessel angiography showed  minor luminal irregularities.  AORTA: Ascending aorta: Normal. No additional grafts visualized in  aortogram.  COMPLICATIONS: There were no complications.  DIAGNOSTIC RECOMMENDATIONS: Coronary angiogram demonstrates patent  LIMA-LAD, closed SVG grafts, and a severe stenosis in the ostial Ramus.  Will perform staged PCI to RI when Cr stable and renally optimized.  Prepared and signed by  Corie Ga M.D.  Signed 10/17/2017 13:49:15    < end of copied text >    < from: Cardiac Cath Lab - Adult (11.17.17 @ 11:48) >  PROCEDURE:  --  Intervention on ramus intermedius: drug-eluting stent.    VENTRICLES: No LV gram was performed; however, a recent echocardiogram  demonstrated normal global and regional LV function.  CORONARY VESSELS: The coronary circulation is right dominant.  LM:   --  LM: Angiography showed minor luminal irregularities with no flow  limiting lesions.  LAD:   --  Ostial LAD: There was a 100 % stenosis.  CX:   --  Circumflex: This vessel was not injected, but was visualized  during a prior cardiac catheterization.  RI:   --  Ramus intermedius: There was a 95 % stenosis.  RCA:   --  RCA: This vessel was not injected.  COMPLICATIONS: There were no complications.  DIAGNOSTIC RECOMMENDATIONS: The patient should continue with the present  medications. will add plavix 75mg po once a day/ will add ECASA 325mg po  once day.  Prepared and signed by  Roberta Quick M.D.  Signed 11/17/2017 13:16:01    < end of copied text >    < from: TTE with Doppler (w/Cont) (11.25.17 @ 12:33) >  CONCLUSIONS:  1. Mitral annular calcification, otherwise normal mitral  valve. Mild mitral regurgitation.  2. Normal left ventricular internal dimensions and wall  thicknesses.  3. Endocardium not well visualized; grossly moderate to  severe segmental left ventricularsystolic dysfunction.  Hypokinesis of the inferior, lateral, and inferolateral  walls.  Endocardial visualization enhanced with intravenous  injection of echo contrast (Definity).  No LV thrombus  seen.  4. The right ventricle is not well visualized;grossly  normal right ventricular systolic function.  ------------------------------------------------------------------------  Confirmed on  11/25/2017 - 14:44:41 by Jose Lucia M.D.    < end of copied text >      ASSESSMENT/PLAN: 	71y Female w/ PMH HTN, CKD, Hyperlipidemia, DM-II, CAD s/p 3V CABG  (LIMA to LAD, SVG to OM, SVG to PDA) at [facility] 2014, s/P CORNELIA TO RI 11/17/17 presenting with shortness of breath, cough, Hypoxic in ED with O2 sats in the 70s, fevers, Leukocytosis and elevated lactate.  CXR showed pulmonary edema in ED. EKG with new intraventricular block and STD V3,V4,V5, CE elevated c/w NSTEMI, given ASA 162mg x1 and started on heparin gtt. 	    --CXR w/ possible PNA treated w/ zithromax, maxipime and vanco x 1 in Ed   --ID input appreciated--observe off abx  --CAD s/p CORNELIA to RI 11/17/17. Cont ASA, Plavix.  ?statin   --NSTEMI and TTE with new moderate to severe LV dysfxn --cont to trend CE.  Cont Heparin Gtt. Plan for LHC once stable/hopefully in the next 48-72 hours.  --Decomp Acute systolic CHF-- Cont IV LASIX, O>I, much improved      Juliane Fitch PA-C  [facility]  [address], [address]   [place] [address]  office [phone]  pager [phone]

## 2017-11-28 NOTE — DIETITIAN INITIAL EVALUATION ADULT. - DIET TYPE
renal replacement pts:no protein restr,no conc K & phos, low sodium/low fat/consistent carbohydrate (no snacks)

## 2017-11-28 NOTE — PROGRESS NOTE ADULT - PROBLEM SELECTOR PLAN 3
- cbc stable  - improving   - maalox prn   - continue protonix bid and carafate qid  - if no improvement may consider gastric emptying study to rule out gastroparesis

## 2017-11-28 NOTE — DIETITIAN INITIAL EVALUATION ADULT. - PROBLEM SELECTOR PLAN 3
with Temp 100.2, elevated WBC and lactate. CXR showing patchy opacities  Treat empirically for possible pneumonia  Received vanc, cefepime, azithro in ED  Blood cultures sent

## 2017-11-28 NOTE — PROGRESS NOTE ADULT - SUBJECTIVE AND OBJECTIVE BOX
CC: F/U for Fever    Saw/spoke to patient. No fevers, no chills. No cough, no chest pain, no SOB. No new complaints. Overall appears well.     Allergies  No Known Allergies    ANTIMICROBIALS:  Off    PE:    Vital Signs Last 24 Hrs  T(C): 36.8 (28 Nov 2017 05:04), Max: 36.8 (28 Nov 2017 05:04)  T(F): 98.3 (28 Nov 2017 05:04), Max: 98.3 (28 Nov 2017 05:04)  HR: 89 (28 Nov 2017 05:04) (89 - 107)  BP: 117/70 (28 Nov 2017 05:04) (105/63 - 117/70)  BP(mean): --  RR: 18 (28 Nov 2017 05:04) (18 - 18)  SpO2: 95% (28 Nov 2017 05:04) (95% - 98%)    Gen: AOx3, NAD, non-toxic, pleasant  CV: S1+S2 normal, no murmurs, nontachycardic  Resp: Clear bilat, no resp distress, no crackles/wheezes  Abd: Soft, nontender, +BS  Ext: No LE edema, no wounds    LABS:                        11.6   14.09 )-----------( 368      ( 28 Nov 2017 06:30 )             35.6     11-27    142  |  102  |  45<H>  ----------------------------<  109<H>  4.5   |  26  |  1.78<H>    Ca    9.2      27 Nov 2017 05:15  Phos  3.0     11-27  Mg     2.2     11-27    MICROBIOLOGY:    URINE CATHETER  11-25-17 NGTD    BLOOD PERIPHERAL  11-25-17 NGTD    RADIOLOGY:    11/27 CXR:    INTERPRETATION:     Heart size and the mediastinum cannot be accurately evaluated on this   projection.  Median sternotomy sutures and surgical clips are again seen.  There is linear atelectasis in left mid to lower lung. The lungs are   otherwise clear.  There is resolution of previous pulmonary vascular congestion.  No pleural effusion or pneumothorax is seen.

## 2017-11-28 NOTE — PROGRESS NOTE ADULT - SUBJECTIVE AND OBJECTIVE BOX
Chief complaint  Patient is a 71y old  Female who presents with a chief complaint of sob,chest pain (27 Nov 2017 11:05)   Review of systems  Patient in bed, looks comfortable, no fever, no hypoglycemia.    Labs and Fingersticks    CAPILLARY BLOOD GLUCOSE        Calcium, Total Serum: 8.6 (11-28 @ 14:22)  Calcium, Total Serum: 9.2 (11-27 @ 05:15)          11-28    134<L>  |  94<L>  |  56<H>  ----------------------------<  321<H>  4.4   |  21<L>  |  2.33<H>    Ca    8.6      28 Nov 2017 14:22  Phos  3.0     11-27  Mg     2.1     11-28                          11.6   14.09 )-----------( 368      ( 28 Nov 2017 06:30 )             35.6     Medications  MEDICATIONS  (STANDING):  aspirin enteric coated 81 milliGRAM(s) Oral daily  chlorhexidine 4% Liquid 1 Application(s) Topical daily  clopidogrel Tablet 75 milliGRAM(s) Oral daily  dextrose 5%. 1000 milliLiter(s) (50 mL/Hr) IV Continuous <Continuous>  dextrose 50% Injectable 12.5 Gram(s) IV Push once  dextrose 50% Injectable 25 Gram(s) IV Push once  dextrose 50% Injectable 25 Gram(s) IV Push once  docusate sodium 100 milliGRAM(s) Oral three times a day  furosemide   Injectable 40 milliGRAM(s) IV Push every 12 hours  heparin  Infusion. 500 Unit(s)/Hr (5 mL/Hr) IV Continuous <Continuous>  insulin glargine Injectable (LANTUS) 55 Unit(s) SubCutaneous at bedtime  insulin lispro (HumaLOG) corrective regimen sliding scale   SubCutaneous three times a day before meals  insulin lispro (HumaLOG) corrective regimen sliding scale   SubCutaneous at bedtime  insulin lispro Injectable (HumaLOG) 15 Unit(s) SubCutaneous three times a day before meals  levothyroxine 50 MICROGram(s) Oral daily  metoprolol succinate ER 12.5 milliGRAM(s) Oral daily  montelukast 10 milliGRAM(s) Oral at bedtime  pantoprazole    Tablet 40 milliGRAM(s) Oral two times a day before meals  polyethylene glycol 3350 17 Gram(s) Oral daily  senna 2 Tablet(s) Oral at bedtime  sucralfate suspension 1 Gram(s) Oral four times a day      Physical Exam  General: Patient comfortable in bed  Vital Signs Last 12 Hrs  T(F): 97.8 (11-28-17 @ 15:14), Max: 97.8 (11-28-17 @ 15:14)  HR: 85 (11-28-17 @ 17:30) (85 - 90)  BP: 108/65 (11-28-17 @ 17:30) (108/65 - 114/63)  BP(mean): --  RR: 17 (11-28-17 @ 17:30) (17 - 18)  SpO2: 98% (11-28-17 @ 17:30) (98% - 99%)  Neck: No palpable thyroid nodules.  CVS: S1S2, No murmurs  Respiratory: No wheezing, no crepitations  GI: Abdomen soft, bowel sounds positive  Musculoskeletal: Positive edema lower extremities.   Skin: No skin rashes, no ecchymosis    Diagnostics

## 2017-11-28 NOTE — PROGRESS NOTE ADULT - PROBLEM SELECTOR PLAN 1
- NSTEMI and TTE with new moderate to severe LV dysfxn  - plans for eventual cardiac catheterization once medically optimized  - cardiology work up appreciated  - keep on ppi for gi ppx while in a/c

## 2017-11-28 NOTE — PROGRESS NOTE ADULT - ASSESSMENT
1.	MERVIN, likely sec to cardio-renal syndrome. Renal function was improving with diuresis. Worsened today sec to increased glucose  2.	CKD stage 3: Baseline Scr 1.5-1.8  3.	CHF decompensation: Getting diuresed. Follow up cadrdiology  4.	Metabolic Acidosis: Better  5.	Hyponatremia: sec to hyperglycemia    PLAN:  1.	Continue lasix per cardiology, change to PO  2.	Better diabetes control  3.	Follow up renal function and electrolyte  4.	Planned for cardiac cath sometimes this week. Start Mucomyst 1200mg BID for 4 dose, starting one day prior to the cardiac cath. Also give her NaHCO3 150meq/L in sterile water for 200cc/hr for one hr prior to cardiac cath and 75cc/hr for 6 hrs after the cardiac cath

## 2017-11-28 NOTE — PROGRESS NOTE ADULT - ASSESSMENT
71 F English speaking female PMH HTN, CKD, Hyperlipidemia, DM-II, CAD s/p CABG, s/p stents presenting with shortness of breath. Patient had recent hospitalization with stent placement, now returns with chest pain, SOB. Never cough, no fevers, no sick contacts. Here had low grade fever to 100.5F, and leukocytosis (no left shift), elevated lactate and troponins. CXR with atelectasis. Lower suspicion for bacterial pneumonia. Fever/leukocytosis ? reactive to cardiac event. Presently off abx. Leukocytosis slightly rising, resp status improved, no fevers. Overall well, out of CCU. Continue off abx.  - Continue off abx  - If signs worsening, signs pna or sepsis would re-start Vanco/Cefepime empiric therapy  - ACS as per cardiology team    Dell Angeles MD  Pager [phone]  After 5pm and on weekends call [phone]

## 2017-11-28 NOTE — DIETITIAN INITIAL EVALUATION ADULT. - PROBLEM SELECTOR PLAN 2
Patient with elevated CE and STD with chest pain. Chest pain free at present.   PARUL score: 7  Admit to CCU R/O ACS  Load with  mg, Plavix 600 mg PO and start heparin gtt as per ACS protocol  NPO for possible cath in am  ASA 81 mg qd, Plavix 75mg qd  Continue Atorvastatin 40 mg QD,    Serial EKG, PRN chest pain  labs include serial cardiac enzymes, risk factor profile   Echo to evaluate LV function and wall motion abnormality

## 2017-11-28 NOTE — DIETITIAN INITIAL EVALUATION ADULT. - OTHER INFO
Nutrition consult received for RD. 70 y/o F with medical history significant of HTN, CKD, HLD, T2DM, CAD, s/p stents. Met with patient and family at bedside. Patient reports appetite improved, and PO intake >75%. Patient denies any nausea/vomiting/diarrhea/constipation or difficulty chewing and swallowing. NKFA. RD provided the patient with extensive verbal and written DM diet education; including, carb counting, label reading, meal planning, pre-prandial and post-prandial finger stick goals, and HbA1c goal. Patient was also made aware of the physiological implications of poor glycemic control. Also, provided with extensive review of Renal diet principles including; phosphorus, potassium, and sodium content of food and recommended intake as well as recommended protein intake and High Biological Value Protein sources (i.e. chicken, turkey, beef, fish, eggs, etc.). Discussed cultural foods and importance of adherence to diet recommendations. Written education materials. All questions and concerns answered at this time. Patient made aware RDN remains available.

## 2017-11-28 NOTE — DIETITIAN INITIAL EVALUATION ADULT. - NS AS NUTRI INTERV MEALS SNACK3
Composition of meals/snacks/Diets modified for specific foods and ingredients/Carbohydrate - modified diet/Fat - modified diet/Mineral - modified diet/Other (specify)/1. Recommend liberalize diet to Consistent carbohydrate/Renal 2. Monitor weights, labs, BM's, skin integrity, p.o. intake. 3. Please Encourage po intake, assist with meals and menu selections, provide alternatives PRN.

## 2017-11-28 NOTE — PROGRESS NOTE ADULT - SUBJECTIVE AND OBJECTIVE BOX
Dr. Muhammad  Office [phone]  Cell [phone]  [facility]  Cell [phone]      Patient is a 71y old  Female who presents with a chief complaint of sob,chest pain (27 Nov 2017 11:05)      Patient seen and examined at bedside. No chest pain/sob    VITALS:  T(F): 97.8 (11-28-17 @ 15:14), Max: 98.3 (11-28-17 @ 05:04)  HR: 85 (11-28-17 @ 17:30)  BP: 108/65 (11-28-17 @ 17:30)  RR: 17 (11-28-17 @ 17:30)  SpO2: 98% (11-28-17 @ 17:30)  Wt(kg): --    11-27 @ 07:01  -  11-28 @ 07:00  --------------------------------------------------------  IN: 410 mL / OUT: 850 mL / NET: -440 mL          PHYSICAL EXAM:  Constitutional: NAD  Neck: No JVD  Respiratory: CTAB, no wheezes, rales or rhonchi  Cardiovascular: S1, S2, RRR  Gastrointestinal: BS+, soft, NT/ND  Extremities: No peripheral edema    Hospital Medications:   MEDICATIONS  (STANDING):  aspirin enteric coated 81 milliGRAM(s) Oral daily  chlorhexidine 4% Liquid 1 Application(s) Topical daily  clopidogrel Tablet 75 milliGRAM(s) Oral daily  dextrose 5%. 1000 milliLiter(s) (50 mL/Hr) IV Continuous <Continuous>  dextrose 50% Injectable 12.5 Gram(s) IV Push once  dextrose 50% Injectable 25 Gram(s) IV Push once  dextrose 50% Injectable 25 Gram(s) IV Push once  docusate sodium 100 milliGRAM(s) Oral three times a day  furosemide   Injectable 40 milliGRAM(s) IV Push every 12 hours  heparin  Infusion. 500 Unit(s)/Hr (5 mL/Hr) IV Continuous <Continuous>  insulin glargine Injectable (LANTUS) 55 Unit(s) SubCutaneous at bedtime  insulin lispro (HumaLOG) corrective regimen sliding scale   SubCutaneous three times a day before meals  insulin lispro (HumaLOG) corrective regimen sliding scale   SubCutaneous at bedtime  insulin lispro Injectable (HumaLOG) 15 Unit(s) SubCutaneous three times a day before meals  levothyroxine 50 MICROGram(s) Oral daily  metoprolol succinate ER 12.5 milliGRAM(s) Oral daily  montelukast 10 milliGRAM(s) Oral at bedtime  pantoprazole    Tablet 40 milliGRAM(s) Oral two times a day before meals  polyethylene glycol 3350 17 Gram(s) Oral daily  senna 2 Tablet(s) Oral at bedtime  sucralfate suspension 1 Gram(s) Oral four times a day      LABS:  11-28    134<L>  |  94<L>  |  56<H>  ----------------------------<  321<H>  4.4   |  21<L>  |  2.33<H>    Ca    8.6      28 Nov 2017 14:22  Phos  3.0     11-27  Mg     2.1     11-28      Creatinine Trend: 2.33 <--, 1.78 <--, 1.90 <--, 1.89 <--, 1.99 <--, 2.16 <--, 2.28 <--                                11.6   14.09 )-----------( 368      ( 28 Nov 2017 06:30 )             35.6     Urine Studies:  Urinalysis - [11-25-17 @ 01:00]      Color PLYEL / Appearance CLEAR / SG 1.020 / pH 6.0      Gluc >1000 / Ketone NEGATIVE  / Bili NEGATIVE / Urobili NORMAL       Blood TRACE / Protein 150 / Leuk Est NEGATIVE / Nitrite NEGATIVE      RBC 2-5 / WBC 10-25 / Hyaline 2-5 / Gran  / Sq Epi OCC / Non Sq Epi  / Bacteria FEW      HbA1c 9.1      [11-25-17 @ 06:30]  TSH 0.79      [11-25-17 @ 06:30]  Lipid: chol 136, , HDL 37, LDL 80      [11-25-17 @ 06:30]        RADIOLOGY & ADDITIONAL STUDIES:

## 2017-11-28 NOTE — DIETITIAN INITIAL EVALUATION ADULT. - ETIOLOGY
related to lack of prior knowledge, undesirable dietary pattern/choices related to physiological causes, endocrine/renal dysfunction

## 2017-11-28 NOTE — PROGRESS NOTE ADULT - SUBJECTIVE AND OBJECTIVE BOX
INTERVAL HPI/OVERNIGHT EVENTS:    pt seen in bed this am   per pt/rn tolerated breakfast well   overnight report of bm per rn, no blood noted  pt denied abdominal pain, nausea or vomiting    MEDICATIONS  (STANDING):  aspirin enteric coated 81 milliGRAM(s) Oral daily  chlorhexidine 4% Liquid 1 Application(s) Topical daily  clopidogrel Tablet 75 milliGRAM(s) Oral daily  dextrose 5%. 1000 milliLiter(s) (50 mL/Hr) IV Continuous <Continuous>  dextrose 50% Injectable 12.5 Gram(s) IV Push once  dextrose 50% Injectable 25 Gram(s) IV Push once  dextrose 50% Injectable 25 Gram(s) IV Push once  docusate sodium 100 milliGRAM(s) Oral three times a day  furosemide   Injectable 40 milliGRAM(s) IV Push every 12 hours  heparin  Infusion. 500 Unit(s)/Hr (5 mL/Hr) IV Continuous <Continuous>  insulin glargine Injectable (LANTUS) 55 Unit(s) SubCutaneous at bedtime  insulin lispro (HumaLOG) corrective regimen sliding scale   SubCutaneous three times a day before meals  insulin lispro (HumaLOG) corrective regimen sliding scale   SubCutaneous at bedtime  insulin lispro Injectable (HumaLOG) 10 Unit(s) SubCutaneous three times a day before meals  levothyroxine 50 MICROGram(s) Oral daily  metoprolol succinate ER 12.5 milliGRAM(s) Oral daily  montelukast 10 milliGRAM(s) Oral at bedtime  pantoprazole    Tablet 40 milliGRAM(s) Oral two times a day before meals  polyethylene glycol 3350 17 Gram(s) Oral daily  senna 2 Tablet(s) Oral at bedtime  sucralfate suspension 1 Gram(s) Oral four times a day    MEDICATIONS  (PRN):  dextrose Gel 1 Dose(s) Oral once PRN Blood Glucose LESS THAN 70 milliGRAM(s)/deciliter  glucagon  Injectable 1 milliGRAM(s) IntraMuscular once PRN Glucose LESS THAN 70 milligrams/deciliter  glucagon  Injectable 1 milliGRAM(s) IntraMuscular once PRN Glucose LESS THAN 70 milligrams/deciliter  heparin  Injectable 3500 Unit(s) IV Push every 6 hours PRN For aPTT less than 40      Allergies    No Known Allergies    Intolerances        Review of Systems:    General:  No wt loss, fevers, chills, night sweats, fatigue   Eyes:  Good vision, no reported pain  ENT:  No sore throat, pain, runny nose, dysphagia  CV:  No pain, palpitations, hypo/hypertension  Resp:  No dyspnea, cough, tachypnea, wheezing  GI:  No pain, No nausea, No vomiting, No diarrhea, No constipation, No weight loss, No fever, No pruritis, No rectal bleeding, No melena, No dysphagia  :  No pain, bleeding, incontinence, nocturia  Muscle:  No pain, weakness  Neuro:  No weakness, tingling, memory problems  Psych:  No fatigue, insomnia, mood problems, depression  Endocrine:  No polyuria, polydypsia, cold/heat intolerance  Heme:  No petechiae, ecchymosis, easy bruisability  Skin:  No rash, tattoos, scars, edema      Vital Signs Last 24 Hrs  T(C): 36.8 (28 Nov 2017 05:04), Max: 36.8 (28 Nov 2017 05:04)  T(F): 98.3 (28 Nov 2017 05:04), Max: 98.3 (28 Nov 2017 05:04)  HR: 89 (28 Nov 2017 05:04) (89 - 107)  BP: 117/70 (28 Nov 2017 05:04) (105/63 - 126/62)  BP(mean): 77 (27 Nov 2017 11:00) (77 - 77)  RR: 18 (28 Nov 2017 05:04) (18 - 26)  SpO2: 95% (28 Nov 2017 05:04) (95% - 98%)    PHYSICAL EXAM:    Constitutional: NAD  HEENT: EOMI, throat clear  Neck: No LAD, supple  Respiratory: CTA and P  Cardiovascular: S1 and S2, RRR, no M  Gastrointestinal: BS+, soft, NT/ND, neg HSM,  Extremities: No peripheral edema, neg clubbing, cyanosis  Vascular: 2+ peripheral pulses  Neurological: A/O x 3, no focal deficits  Psychiatric: Normal mood, normal affect  Skin: No rashes      LABS:                        11.6   14.09 )-----------( 368      ( 28 Nov 2017 06:30 )             35.6     11-27    142  |  102  |  45<H>  ----------------------------<  109<H>  4.5   |  26  |  1.78<H>    Ca    9.2      27 Nov 2017 05:15  Phos  3.0     11-27  Mg     2.2     11-27      PTT - ( 28 Nov 2017 06:30 )  PTT:129.5 SEC      RADIOLOGY & ADDITIONAL TESTS:

## 2017-11-29 LAB
APTT BLD: 44.9 SEC — HIGH (ref 27.5–37.4)
APTT BLD: 55.6 SEC — HIGH (ref 27.5–37.4)
APTT BLD: 99.4 SEC — HIGH (ref 27.5–37.4)
BASOPHILS # BLD AUTO: 0.07 K/UL — SIGNIFICANT CHANGE UP (ref 0–0.2)
BASOPHILS NFR BLD AUTO: 0.6 % — SIGNIFICANT CHANGE UP (ref 0–2)
BUN SERPL-MCNC: 55 MG/DL — HIGH (ref 7–23)
CALCIUM SERPL-MCNC: 8.6 MG/DL — SIGNIFICANT CHANGE UP (ref 8.4–10.5)
CHLORIDE SERPL-SCNC: 98 MMOL/L — SIGNIFICANT CHANGE UP (ref 98–107)
CK SERPL-CCNC: 234 U/L — HIGH (ref 25–170)
CO2 SERPL-SCNC: 22 MMOL/L — SIGNIFICANT CHANGE UP (ref 22–31)
CREAT SERPL-MCNC: 2.06 MG/DL — HIGH (ref 0.5–1.3)
EOSINOPHIL # BLD AUTO: 0.61 K/UL — HIGH (ref 0–0.5)
EOSINOPHIL NFR BLD AUTO: 4.9 % — SIGNIFICANT CHANGE UP (ref 0–6)
GLUCOSE BLDC GLUCOMTR-MCNC: 101 MG/DL — HIGH (ref 70–99)
GLUCOSE BLDC GLUCOMTR-MCNC: 172 MG/DL — HIGH (ref 70–99)
GLUCOSE BLDC GLUCOMTR-MCNC: 233 MG/DL — HIGH (ref 70–99)
GLUCOSE BLDC GLUCOMTR-MCNC: 335 MG/DL — HIGH (ref 70–99)
GLUCOSE SERPL-MCNC: 155 MG/DL — HIGH (ref 70–99)
HCT VFR BLD CALC: 33.3 % — LOW (ref 34.5–45)
HCT VFR BLD CALC: 33.3 % — LOW (ref 34.5–45)
HGB BLD-MCNC: 11.2 G/DL — LOW (ref 11.5–15.5)
HGB BLD-MCNC: 11.2 G/DL — LOW (ref 11.5–15.5)
IMM GRANULOCYTES # BLD AUTO: 0.08 # — SIGNIFICANT CHANGE UP
IMM GRANULOCYTES NFR BLD AUTO: 0.6 % — SIGNIFICANT CHANGE UP (ref 0–1.5)
LYMPHOCYTES # BLD AUTO: 3.96 K/UL — HIGH (ref 1–3.3)
LYMPHOCYTES # BLD AUTO: 31.8 % — SIGNIFICANT CHANGE UP (ref 13–44)
MAGNESIUM SERPL-MCNC: 2.2 MG/DL — SIGNIFICANT CHANGE UP (ref 1.6–2.6)
MCHC RBC-ENTMCNC: 29.6 PG — SIGNIFICANT CHANGE UP (ref 27–34)
MCHC RBC-ENTMCNC: 29.6 PG — SIGNIFICANT CHANGE UP (ref 27–34)
MCHC RBC-ENTMCNC: 33.6 % — SIGNIFICANT CHANGE UP (ref 32–36)
MCHC RBC-ENTMCNC: 33.6 % — SIGNIFICANT CHANGE UP (ref 32–36)
MCV RBC AUTO: 87.9 FL — SIGNIFICANT CHANGE UP (ref 80–100)
MCV RBC AUTO: 87.9 FL — SIGNIFICANT CHANGE UP (ref 80–100)
MONOCYTES # BLD AUTO: 1.03 K/UL — HIGH (ref 0–0.9)
MONOCYTES NFR BLD AUTO: 8.3 % — SIGNIFICANT CHANGE UP (ref 2–14)
NEUTROPHILS # BLD AUTO: 6.72 K/UL — SIGNIFICANT CHANGE UP (ref 1.8–7.4)
NEUTROPHILS NFR BLD AUTO: 53.8 % — SIGNIFICANT CHANGE UP (ref 43–77)
NRBC # FLD: 0 — SIGNIFICANT CHANGE UP
NRBC # FLD: 0 — SIGNIFICANT CHANGE UP
PLATELET # BLD AUTO: 350 K/UL — SIGNIFICANT CHANGE UP (ref 150–400)
PLATELET # BLD AUTO: 350 K/UL — SIGNIFICANT CHANGE UP (ref 150–400)
PMV BLD: 9.3 FL — SIGNIFICANT CHANGE UP (ref 7–13)
PMV BLD: 9.3 FL — SIGNIFICANT CHANGE UP (ref 7–13)
POTASSIUM SERPL-MCNC: 4.1 MMOL/L — SIGNIFICANT CHANGE UP (ref 3.5–5.3)
POTASSIUM SERPL-SCNC: 4.1 MMOL/L — SIGNIFICANT CHANGE UP (ref 3.5–5.3)
RBC # BLD: 3.79 M/UL — LOW (ref 3.8–5.2)
RBC # BLD: 3.79 M/UL — LOW (ref 3.8–5.2)
RBC # FLD: 14.1 % — SIGNIFICANT CHANGE UP (ref 10.3–14.5)
RBC # FLD: 14.1 % — SIGNIFICANT CHANGE UP (ref 10.3–14.5)
SODIUM SERPL-SCNC: 137 MMOL/L — SIGNIFICANT CHANGE UP (ref 135–145)
TROPONIN T SERPL-MCNC: 5.59 NG/ML — HIGH (ref 0–0.06)
WBC # BLD: 12.47 K/UL — HIGH (ref 3.8–10.5)
WBC # BLD: 12.47 K/UL — HIGH (ref 3.8–10.5)
WBC # FLD AUTO: 12.47 K/UL — HIGH (ref 3.8–10.5)
WBC # FLD AUTO: 12.47 K/UL — HIGH (ref 3.8–10.5)

## 2017-11-29 RX ADMIN — INSULIN GLARGINE 55 UNIT(S): 100 INJECTION, SOLUTION SUBCUTANEOUS at 22:24

## 2017-11-29 RX ADMIN — Medication 40 MILLIGRAM(S): at 05:55

## 2017-11-29 RX ADMIN — HEPARIN SODIUM 300 UNIT(S)/HR: 5000 INJECTION INTRAVENOUS; SUBCUTANEOUS at 09:00

## 2017-11-29 RX ADMIN — PANTOPRAZOLE SODIUM 40 MILLIGRAM(S): 20 TABLET, DELAYED RELEASE ORAL at 05:54

## 2017-11-29 RX ADMIN — Medication 15 UNIT(S): at 08:57

## 2017-11-29 RX ADMIN — Medication 1 GRAM(S): at 17:03

## 2017-11-29 RX ADMIN — Medication 1 GRAM(S): at 22:23

## 2017-11-29 RX ADMIN — SENNA PLUS 2 TABLET(S): 8.6 TABLET ORAL at 21:15

## 2017-11-29 RX ADMIN — Medication 12.5 MILLIGRAM(S): at 05:54

## 2017-11-29 RX ADMIN — Medication 4: at 12:46

## 2017-11-29 RX ADMIN — Medication 15 UNIT(S): at 12:46

## 2017-11-29 RX ADMIN — PANTOPRAZOLE SODIUM 40 MILLIGRAM(S): 20 TABLET, DELAYED RELEASE ORAL at 17:03

## 2017-11-29 RX ADMIN — Medication 100 MILLIGRAM(S): at 05:55

## 2017-11-29 RX ADMIN — HEPARIN SODIUM 0 UNIT(S)/HR: 5000 INJECTION INTRAVENOUS; SUBCUTANEOUS at 08:05

## 2017-11-29 RX ADMIN — Medication 4: at 22:24

## 2017-11-29 RX ADMIN — HEPARIN SODIUM 500 UNIT(S)/HR: 5000 INJECTION INTRAVENOUS; SUBCUTANEOUS at 01:27

## 2017-11-29 RX ADMIN — Medication 100 MILLIGRAM(S): at 21:15

## 2017-11-29 RX ADMIN — Medication 81 MILLIGRAM(S): at 12:45

## 2017-11-29 RX ADMIN — MONTELUKAST 10 MILLIGRAM(S): 4 TABLET, CHEWABLE ORAL at 21:15

## 2017-11-29 RX ADMIN — HEPARIN SODIUM 400 UNIT(S)/HR: 5000 INJECTION INTRAVENOUS; SUBCUTANEOUS at 15:38

## 2017-11-29 RX ADMIN — Medication 2: at 08:57

## 2017-11-29 RX ADMIN — CLOPIDOGREL BISULFATE 75 MILLIGRAM(S): 75 TABLET, FILM COATED ORAL at 12:45

## 2017-11-29 RX ADMIN — Medication 1 GRAM(S): at 12:45

## 2017-11-29 RX ADMIN — Medication 50 MICROGRAM(S): at 05:55

## 2017-11-29 NOTE — PROGRESS NOTE ADULT - SUBJECTIVE AND OBJECTIVE BOX
Dr. Muhammad (Nephrology)  Office [phone]  Cell [phone]  [facility]  Cell [phone]      Patient is a 71y old  Female who presents with a chief complaint of sob,chest pain (27 Nov 2017 11:05)      Patient seen and examined at bedside. c/o burning chest pain but improving     VITALS:  T(F): 98.1 (11-29-17 @ 05:00), Max: 98.1 (11-29-17 @ 05:00)  HR: 86 (11-29-17 @ 05:00)  BP: 123/69 (11-29-17 @ 05:00)  RR: 18 (11-29-17 @ 05:00)  SpO2: 97% (11-29-17 @ 05:00)  Wt(kg): --        PHYSICAL EXAM:  Constitutional: NAD  Neck: No JVD  Respiratory: CTAB, no wheezes, rales or rhonchi  Cardiovascular: S1, S2, RRR  Gastrointestinal: BS+, soft, NT/ND  Extremities: No peripheral edema    Hospital Medications:   MEDICATIONS  (STANDING):  aspirin enteric coated 81 milliGRAM(s) Oral daily  clopidogrel Tablet 75 milliGRAM(s) Oral daily  dextrose 5%. 1000 milliLiter(s) (50 mL/Hr) IV Continuous <Continuous>  dextrose 50% Injectable 12.5 Gram(s) IV Push once  dextrose 50% Injectable 25 Gram(s) IV Push once  dextrose 50% Injectable 25 Gram(s) IV Push once  docusate sodium 100 milliGRAM(s) Oral three times a day  furosemide    Tablet 40 milliGRAM(s) Oral daily  heparin  Infusion. 500 Unit(s)/Hr (5 mL/Hr) IV Continuous <Continuous>  insulin glargine Injectable (LANTUS) 55 Unit(s) SubCutaneous at bedtime  insulin lispro (HumaLOG) corrective regimen sliding scale   SubCutaneous three times a day before meals  insulin lispro (HumaLOG) corrective regimen sliding scale   SubCutaneous at bedtime  insulin lispro Injectable (HumaLOG) 15 Unit(s) SubCutaneous three times a day before meals  levothyroxine 50 MICROGram(s) Oral daily  metoprolol succinate ER 12.5 milliGRAM(s) Oral daily  montelukast 10 milliGRAM(s) Oral at bedtime  pantoprazole    Tablet 40 milliGRAM(s) Oral two times a day before meals  polyethylene glycol 3350 17 Gram(s) Oral daily  senna 2 Tablet(s) Oral at bedtime  sucralfate suspension 1 Gram(s) Oral four times a day      LABS:  11-29    137  |  98  |  55<H>  ----------------------------<  155<H>  4.1   |  22  |  2.06<H>    Ca    8.6      29 Nov 2017 06:00  Mg     2.2     11-29      Creatinine Trend: 2.06 <--, 2.33 <--, 1.78 <--, 1.90 <--, 1.89 <--, 1.99 <--, 2.16 <--, 2.28 <--                                11.2   12.47 )-----------( 350      ( 29 Nov 2017 07:58 )             33.3     Urine Studies:  Urinalysis - [11-25-17 @ 01:00]      Color PLYEL / Appearance CLEAR / SG 1.020 / pH 6.0      Gluc >1000 / Ketone NEGATIVE  / Bili NEGATIVE / Urobili NORMAL       Blood TRACE / Protein 150 / Leuk Est NEGATIVE / Nitrite NEGATIVE      RBC 2-5 / WBC 10-25 / Hyaline 2-5 / Gran  / Sq Epi OCC / Non Sq Epi  / Bacteria FEW      HbA1c 9.1      [11-25-17 @ 06:30]  TSH 0.79      [11-25-17 @ 06:30]  Lipid: chol 136, , HDL 37, LDL 80      [11-25-17 @ 06:30]        RADIOLOGY & ADDITIONAL STUDIES:

## 2017-11-29 NOTE — PROGRESS NOTE ADULT - SUBJECTIVE AND OBJECTIVE BOX
Subjective: SOB improved, no CP	    MEDICATIONS  (STANDING):  aspirin enteric coated 81 milliGRAM(s) Oral daily  clopidogrel Tablet 75 milliGRAM(s) Oral daily  docusate sodium 100 milliGRAM(s) Oral three times a day  furosemide    Tablet 40 milliGRAM(s) Oral daily  insulin glargine Injectable (LANTUS) 55 Unit(s) SubCutaneous at bedtime  insulin lispro (HumaLOG) corrective regimen sliding scale   SubCutaneous three times a day before meals  insulin lispro (HumaLOG) corrective regimen sliding scale   SubCutaneous at bedtime  insulin lispro Injectable (HumaLOG) 15 Unit(s) SubCutaneous three times a day before meals  levothyroxine 50 MICROGram(s) Oral daily  metoprolol succinate ER 12.5 milliGRAM(s) Oral daily  montelukast 10 milliGRAM(s) Oral at bedtime  pantoprazole    Tablet 40 milliGRAM(s) Oral two times a day before meals  polyethylene glycol 3350 17 Gram(s) Oral daily  senna 2 Tablet(s) Oral at bedtime  sucralfate suspension 1 Gram(s) Oral four times a day    LABS:                        11.2   12.47 )-----------( 350      ( 29 Nov 2017 07:58 )             33.3       137  |  98  |  55<H>  ----------------------------<  155<H>  4.1   |  22  |  2.06<H>    Ca    8.6      29 Nov 2017 06:00  Mg     2.2     11-29      Creatinine Trend: 2.06<--, 2.33<--, 1.78<--, 1.90<--, 1.89<--, 1.99<--   PTT - ( 29 Nov 2017 14:46 )  PTT:44.9 SEC  CARDIAC MARKERS ( 29 Nov 2017 06:00 )  x     / 5.59 ng/mL / 234 u/L / x     / x      CARDIAC MARKERS ( 28 Nov 2017 14:22 )  x     / 6.44 ng/mL / 284 u/L / x     / x      CARDIAC MARKERS ( 27 Nov 2017 16:00 )  x     / 7.23 ng/mL / 258 u/L / 6.32 ng/mL / x      CARDIAC MARKERS ( 27 Nov 2017 05:15 )  x     / 6.75 ng/mL / 315 u/L / 7.82 ng/mL / x        PHYSICAL EXAM  Vital Signs Last 24 Hrs  T(C): 36.6 (29 Nov 2017 13:57), Max: 36.7 (29 Nov 2017 05:00)  T(F): 97.9 (29 Nov 2017 13:57), Max: 98.1 (29 Nov 2017 05:00)  HR: 85 (29 Nov 2017 13:57) (85 - 87)  BP: 119/70 (29 Nov 2017 13:57) (108/65 - 123/69)  RR: 17 (29 Nov 2017 13:57) (17 - 18)  SpO2: 99% (29 Nov 2017 13:57) (97% - 99%)    Cardiovascular: Normal S1 S2,     No JVD, 1/6 YUSUF murmur, Peripheral pulses palpable 2+ bilaterally  Respiratory: Lungs clear to auscultation, normal effort 	  Gastrointestinal:  Soft, Non-tender, + BS	  Extremities no edema, cyanosis, clubbing B/L LE's     DIAGNOSTIC TESTING:    < from: Cardiac Cath Lab - Adult (10.13.17 @ 10:40) >  VENTRICLES: No left ventriculogram was performed.  CORONARY VESSELS: The coronary circulation is right dominant.  LM:   --  LM: Normal.  LAD:   --  Proximal LAD: There was a diffuse 60 % stenosis.  --  Mid LAD: There was a 100 % stenosis with the distal vessel being filled  via LIMA-LAD.  CX:   --  Circumflex: The vessel was small sized. Angiography showed mild  atherosclerosis withno flow limiting lesions.  --  OM1: Angiography showed mild atherosclerosis with no flow limiting  lesions.  RI:   --  Ostial ramus intermedius: There was a tubular 80 % stenosis.  There was PARUL grade 3 flow through the vessel (brisk flow).  RCA:   --  Proximal RCA: Angiography showed mild atherosclerosis with no  flow limiting lesions.  --  Mid RCA: There was a 100 % stenosis with the distal vessel being filled  via collaterals. This lesion is a chronic total occlusion.  GRAFTS:   --  Graft to the LAD: The graft was a LIMA. Graft angiography  showed minor luminal irregularities. Distal vessel angiography showed  minor luminal irregularities.  AORTA: Ascending aorta: Normal. No additional grafts visualized in  aortogram.  COMPLICATIONS: There were no complications.  DIAGNOSTIC RECOMMENDATIONS: Coronary angiogram demonstrates patent  LIMA-LAD, closed SVG grafts, and a severe stenosis in the ostial Ramus.  Will perform staged PCI to RI when Cr stable and renally optimized.  Prepared and signed by  Corie Ga M.D.  Signed 10/17/2017 13:49:15    < end of copied text >    < from: Cardiac Cath Lab - Adult (11.17.17 @ 11:48) >  PROCEDURE:  --  Intervention on ramus intermedius: drug-eluting stent.    VENTRICLES: No LV gram was performed; however, a recent echocardiogram  demonstrated normal global and regional LV function.  CORONARY VESSELS: The coronary circulation is right dominant.  LM:   --  LM: Angiography showed minor luminal irregularities with no flow  limiting lesions.  LAD:   --  Ostial LAD: There was a 100 % stenosis.  CX:   --  Circumflex: This vessel was not injected, but was visualized  during a prior cardiac catheterization.  RI:   --  Ramus intermedius: There was a 95 % stenosis.  RCA:   --  RCA: This vessel was not injected.  COMPLICATIONS: There were no complications.  DIAGNOSTIC RECOMMENDATIONS: The patient should continue with the present  medications. will add plavix 75mg po once a day/ will add ECASA 325mg po  once day.  Prepared and signed by  Roberta Quick M.D.  Signed 11/17/2017 13:16:01    < end of copied text >    < from: TTE with Doppler (w/Cont) (11.25.17 @ 12:33) >  CONCLUSIONS:  1. Mitral annular calcification, otherwise normal mitral  valve. Mild mitral regurgitation.  2. Normal left ventricular internal dimensions and wall  thicknesses.  3. Endocardium not well visualized; grossly moderate to  severe segmental left ventricularsystolic dysfunction.  Hypokinesis of the inferior, lateral, and inferolateral  walls.  Endocardial visualization enhanced with intravenous  injection of echo contrast (Definity).  No LV thrombus  seen.  4. The right ventricle is not well visualized;grossly  normal right ventricular systolic function.  ------------------------------------------------------------------------  Confirmed on  11/25/2017 - 14:44:41 by Jose Lucia M.D.    < end of copied text >      ASSESSMENT/PLAN: 	71y Female w/ PMH HTN, CKD, Hyperlipidemia, DM-II, CAD s/p 3V CABG  (LIMA to LAD, SVG to OM, SVG to PDA) at [facility] 2014, s/P CORNELIA TO RI 11/17/17 presenting with shortness of breath, cough, Hypoxic in ED with O2 sats in the 70s, fevers, Leukocytosis and elevated lactate.  CXR showed pulmonary edema in ED. EKG with new intraventricular block and STD V3,V4,V5, CE elevated c/w NSTEMI, given ASA 162mg x1 and started on heparin gtt. 	    --CXR w/ possible PNA treated w/ zithromax, maxipime and vanco x 1 in Ed   --ID input appreciated--observe off abx  --CAD s/p CORNELIA to RI 11/17/17. Cont ASA, Plavix.  ?statin   --NSTEMI and TTE with new moderate to severe LV dysfxn --cont to trend CE  --Decomp Acute systolic CHF-- Cont PO Lasix, much improved  --DC heparin Gtt  --Possible LHC in the next  hours if stable      Juliane Fitch PA-C  [facility]  [address], [address]   [place] [address]  office [phone]  pager [phone]

## 2017-11-29 NOTE — PROGRESS NOTE ADULT - ASSESSMENT
1.	MERVIN, likely sec to cardio-renal syndrome. Renal function was improving with diuresing, worsen yesterday likely sec to hyperglycemia. lasix now reduced, renal function is improving   2.	CKD stage 3: Baseline Scr 1.5-1.8  3.	CHF decompensation: Getting diuresed. Follow up cadrdiology  4.	Metabolic Acidosis: Better  5.	Hyponatremia: sec to hyperglycemia    PLAN:  1.	Continue lasix per cardiology  2.	Follow up renal function and electrolyte  3.	Planned for cardiac cath sometimes this week. Start Mucomyst 1200mg BID for 4 dose, starting one day prior to the cardiac cath. Also give her NaHCO3 150meq/L in sterile water for 200cc/hr for one hr prior to cardiac cath and 75cc/hr for 6 hrs after the cardiac cath

## 2017-11-29 NOTE — PROGRESS NOTE ADULT - SUBJECTIVE AND OBJECTIVE BOX
Chief complaint  Patient is a 71y old  Female who presents with a chief complaint of sob,chest pain (27 Nov 2017 11:05)   Review of systems  Patient in bed, looks comfortable, no fever, no hypoglycemia.    Labs and Fingersticks    CAPILLARY BLOOD GLUCOSE        Calcium, Total Serum: 8.6 (11-29 @ 06:00)  Calcium, Total Serum: 8.6 (11-28 @ 14:22)          11-29    137  |  98  |  55<H>  ----------------------------<  155<H>  4.1   |  22  |  2.06<H>    Ca    8.6      29 Nov 2017 06:00  Mg     2.2     11-29                          11.2   12.47 )-----------( 350      ( 29 Nov 2017 07:58 )             33.3     Medications  MEDICATIONS  (STANDING):  aspirin enteric coated 81 milliGRAM(s) Oral daily  clopidogrel Tablet 75 milliGRAM(s) Oral daily  dextrose 5%. 1000 milliLiter(s) (50 mL/Hr) IV Continuous <Continuous>  dextrose 50% Injectable 12.5 Gram(s) IV Push once  dextrose 50% Injectable 25 Gram(s) IV Push once  dextrose 50% Injectable 25 Gram(s) IV Push once  docusate sodium 100 milliGRAM(s) Oral three times a day  furosemide    Tablet 40 milliGRAM(s) Oral daily  insulin glargine Injectable (LANTUS) 55 Unit(s) SubCutaneous at bedtime  insulin lispro (HumaLOG) corrective regimen sliding scale   SubCutaneous three times a day before meals  insulin lispro (HumaLOG) corrective regimen sliding scale   SubCutaneous at bedtime  insulin lispro Injectable (HumaLOG) 15 Unit(s) SubCutaneous three times a day before meals  levothyroxine 50 MICROGram(s) Oral daily  metoprolol succinate ER 12.5 milliGRAM(s) Oral daily  montelukast 10 milliGRAM(s) Oral at bedtime  pantoprazole    Tablet 40 milliGRAM(s) Oral two times a day before meals  polyethylene glycol 3350 17 Gram(s) Oral daily  senna 2 Tablet(s) Oral at bedtime  sucralfate suspension 1 Gram(s) Oral four times a day      Physical Exam  General: Patient comfortable in bed  Vital Signs Last 12 Hrs  T(F): 97.9 (11-29-17 @ 13:57), Max: 97.9 (11-29-17 @ 13:57)  HR: 85 (11-29-17 @ 13:57) (85 - 85)  BP: 119/70 (11-29-17 @ 13:57) (119/70 - 119/70)  BP(mean): --  RR: 17 (11-29-17 @ 13:57) (17 - 17)  SpO2: 99% (11-29-17 @ 13:57) (99% - 99%)  Neck: No palpable thyroid nodules.  CVS: S1S2, No murmurs  Respiratory: No wheezing, no crepitations  GI: Abdomen soft, bowel sounds positive  Musculoskeletal: Positive edema lower extremities.   Skin: No skin rashes, no ecchymosis    Diagnostics

## 2017-11-29 NOTE — PROGRESS NOTE ADULT - PROBLEM SELECTOR PLAN 3
- improving   - maalox prn   - continue protonix bid and carafate qid  - if no improvement may consider gastric emptying study to rule out gastroparesis

## 2017-11-30 LAB
BACTERIA BLD CULT: SIGNIFICANT CHANGE UP
BACTERIA BLD CULT: SIGNIFICANT CHANGE UP
BASOPHILS # BLD AUTO: 0.05 K/UL — SIGNIFICANT CHANGE UP (ref 0–0.2)
BASOPHILS NFR BLD AUTO: 0.5 % — SIGNIFICANT CHANGE UP (ref 0–2)
BUN SERPL-MCNC: 59 MG/DL — HIGH (ref 7–23)
CALCIUM SERPL-MCNC: 8.6 MG/DL — SIGNIFICANT CHANGE UP (ref 8.4–10.5)
CHLORIDE SERPL-SCNC: 98 MMOL/L — SIGNIFICANT CHANGE UP (ref 98–107)
CK MB BLD-MCNC: 4.27 NG/ML — SIGNIFICANT CHANGE UP (ref 1–4.7)
CK SERPL-CCNC: 152 U/L — SIGNIFICANT CHANGE UP (ref 25–170)
CO2 SERPL-SCNC: 19 MMOL/L — LOW (ref 22–31)
CREAT SERPL-MCNC: 2.25 MG/DL — HIGH (ref 0.5–1.3)
EOSINOPHIL # BLD AUTO: 0.56 K/UL — HIGH (ref 0–0.5)
EOSINOPHIL NFR BLD AUTO: 5.1 % — SIGNIFICANT CHANGE UP (ref 0–6)
GLUCOSE BLDC GLUCOMTR-MCNC: 217 MG/DL — HIGH (ref 70–99)
GLUCOSE BLDC GLUCOMTR-MCNC: 251 MG/DL — HIGH (ref 70–99)
GLUCOSE BLDC GLUCOMTR-MCNC: 281 MG/DL — HIGH (ref 70–99)
GLUCOSE BLDC GLUCOMTR-MCNC: 446 MG/DL — HIGH (ref 70–99)
GLUCOSE SERPL-MCNC: 298 MG/DL — HIGH (ref 70–99)
HCT VFR BLD CALC: 31.4 % — LOW (ref 34.5–45)
HCT VFR BLD CALC: 31.4 % — LOW (ref 34.5–45)
HGB BLD-MCNC: 10.6 G/DL — LOW (ref 11.5–15.5)
HGB BLD-MCNC: 10.6 G/DL — LOW (ref 11.5–15.5)
IMM GRANULOCYTES # BLD AUTO: 0.09 # — SIGNIFICANT CHANGE UP
IMM GRANULOCYTES NFR BLD AUTO: 0.8 % — SIGNIFICANT CHANGE UP (ref 0–1.5)
LYMPHOCYTES # BLD AUTO: 2.93 K/UL — SIGNIFICANT CHANGE UP (ref 1–3.3)
LYMPHOCYTES # BLD AUTO: 26.7 % — SIGNIFICANT CHANGE UP (ref 13–44)
MAGNESIUM SERPL-MCNC: 2.3 MG/DL — SIGNIFICANT CHANGE UP (ref 1.6–2.6)
MCHC RBC-ENTMCNC: 29.4 PG — SIGNIFICANT CHANGE UP (ref 27–34)
MCHC RBC-ENTMCNC: 29.4 PG — SIGNIFICANT CHANGE UP (ref 27–34)
MCHC RBC-ENTMCNC: 33.8 % — SIGNIFICANT CHANGE UP (ref 32–36)
MCHC RBC-ENTMCNC: 33.8 % — SIGNIFICANT CHANGE UP (ref 32–36)
MCV RBC AUTO: 87.2 FL — SIGNIFICANT CHANGE UP (ref 80–100)
MCV RBC AUTO: 87.2 FL — SIGNIFICANT CHANGE UP (ref 80–100)
MONOCYTES # BLD AUTO: 0.95 K/UL — HIGH (ref 0–0.9)
MONOCYTES NFR BLD AUTO: 8.7 % — SIGNIFICANT CHANGE UP (ref 2–14)
NEUTROPHILS # BLD AUTO: 6.38 K/UL — SIGNIFICANT CHANGE UP (ref 1.8–7.4)
NEUTROPHILS NFR BLD AUTO: 58.2 % — SIGNIFICANT CHANGE UP (ref 43–77)
NRBC # FLD: 0 — SIGNIFICANT CHANGE UP
NRBC # FLD: 0 — SIGNIFICANT CHANGE UP
PLATELET # BLD AUTO: 349 K/UL — SIGNIFICANT CHANGE UP (ref 150–400)
PLATELET # BLD AUTO: 349 K/UL — SIGNIFICANT CHANGE UP (ref 150–400)
PMV BLD: 9.2 FL — SIGNIFICANT CHANGE UP (ref 7–13)
PMV BLD: 9.2 FL — SIGNIFICANT CHANGE UP (ref 7–13)
POTASSIUM SERPL-MCNC: 4.3 MMOL/L — SIGNIFICANT CHANGE UP (ref 3.5–5.3)
POTASSIUM SERPL-SCNC: 4.3 MMOL/L — SIGNIFICANT CHANGE UP (ref 3.5–5.3)
RBC # BLD: 3.6 M/UL — LOW (ref 3.8–5.2)
RBC # BLD: 3.6 M/UL — LOW (ref 3.8–5.2)
RBC # FLD: 13.7 % — SIGNIFICANT CHANGE UP (ref 10.3–14.5)
RBC # FLD: 13.7 % — SIGNIFICANT CHANGE UP (ref 10.3–14.5)
SODIUM SERPL-SCNC: 135 MMOL/L — SIGNIFICANT CHANGE UP (ref 135–145)
TROPONIN T SERPL-MCNC: 4.09 NG/ML — HIGH (ref 0–0.06)
WBC # BLD: 10.96 K/UL — HIGH (ref 3.8–10.5)
WBC # BLD: 10.96 K/UL — HIGH (ref 3.8–10.5)
WBC # FLD AUTO: 10.96 K/UL — HIGH (ref 3.8–10.5)
WBC # FLD AUTO: 10.96 K/UL — HIGH (ref 3.8–10.5)

## 2017-11-30 PROCEDURE — 99232 SBSQ HOSP IP/OBS MODERATE 35: CPT

## 2017-11-30 PROCEDURE — 71020: CPT | Mod: 26

## 2017-11-30 RX ORDER — ATORVASTATIN CALCIUM 80 MG/1
40 TABLET, FILM COATED ORAL AT BEDTIME
Qty: 0 | Refills: 0 | Status: DISCONTINUED | OUTPATIENT
Start: 2017-11-30 | End: 2017-12-11

## 2017-11-30 RX ADMIN — Medication 15 UNIT(S): at 13:18

## 2017-11-30 RX ADMIN — Medication 100 MILLIGRAM(S): at 06:00

## 2017-11-30 RX ADMIN — Medication 6: at 08:55

## 2017-11-30 RX ADMIN — Medication 1 GRAM(S): at 06:00

## 2017-11-30 RX ADMIN — Medication 1 GRAM(S): at 17:37

## 2017-11-30 RX ADMIN — PANTOPRAZOLE SODIUM 40 MILLIGRAM(S): 20 TABLET, DELAYED RELEASE ORAL at 06:00

## 2017-11-30 RX ADMIN — Medication 4: at 17:37

## 2017-11-30 RX ADMIN — ATORVASTATIN CALCIUM 40 MILLIGRAM(S): 80 TABLET, FILM COATED ORAL at 21:10

## 2017-11-30 RX ADMIN — Medication 40 MILLIGRAM(S): at 06:00

## 2017-11-30 RX ADMIN — Medication 15 UNIT(S): at 08:54

## 2017-11-30 RX ADMIN — Medication 81 MILLIGRAM(S): at 13:18

## 2017-11-30 RX ADMIN — Medication 15 UNIT(S): at 17:37

## 2017-11-30 RX ADMIN — INSULIN GLARGINE 55 UNIT(S): 100 INJECTION, SOLUTION SUBCUTANEOUS at 22:11

## 2017-11-30 RX ADMIN — PANTOPRAZOLE SODIUM 40 MILLIGRAM(S): 20 TABLET, DELAYED RELEASE ORAL at 17:37

## 2017-11-30 RX ADMIN — Medication 6: at 13:19

## 2017-11-30 RX ADMIN — Medication 12.5 MILLIGRAM(S): at 06:00

## 2017-11-30 RX ADMIN — SENNA PLUS 2 TABLET(S): 8.6 TABLET ORAL at 21:10

## 2017-11-30 RX ADMIN — Medication 50 MICROGRAM(S): at 06:00

## 2017-11-30 RX ADMIN — Medication 100 MILLIGRAM(S): at 21:10

## 2017-11-30 RX ADMIN — MONTELUKAST 10 MILLIGRAM(S): 4 TABLET, CHEWABLE ORAL at 21:10

## 2017-11-30 RX ADMIN — CLOPIDOGREL BISULFATE 75 MILLIGRAM(S): 75 TABLET, FILM COATED ORAL at 13:18

## 2017-11-30 RX ADMIN — Medication 8: at 22:10

## 2017-11-30 RX ADMIN — Medication 1 GRAM(S): at 13:18

## 2017-11-30 NOTE — PROGRESS NOTE ADULT - PROBLEM SELECTOR PLAN 1
- NSTEMI and TTE with new moderate to severe LV dysfxn  - cardiology work up appreciated  - keep on ppi for gi ppx while in a/c  - plans for eventual cardiac catheterization once medically optimized

## 2017-11-30 NOTE — PROGRESS NOTE ADULT - SUBJECTIVE AND OBJECTIVE BOX
CC: F/U for ? Fever    Saw/spoke to patient. Patient well. No cough, no SOB, no other complaints.    Allergies  No Known Allergies    ANTIMICROBIALS:  No new available    PE:    Vital Signs Last 24 Hrs  T(C): 36.6 (30 Nov 2017 06:00), Max: 36.6 (29 Nov 2017 13:57)  T(F): 97.9 (30 Nov 2017 06:00), Max: 97.9 (29 Nov 2017 13:57)  HR: 80 (30 Nov 2017 06:00) (80 - 94)  BP: 126/67 (30 Nov 2017 06:00) (119/63 - 142/57)  BP(mean): --  RR: 18 (30 Nov 2017 06:00) (17 - 18)  SpO2: 95% (30 Nov 2017 06:00) (95% - 100%)    Gen: AOx3, NAD, non-toxic, pleasant  CV: S1+S2 normal, no murmurs, nontachycardic  Resp: Clear bilat, no resp distress, no crackles/wheezes  Abd: Soft, nontender, +BS  Ext: No LE edema, no wounds    LABS:                        10.6   10.96 )-----------( 349      ( 30 Nov 2017 07:05 )             31.4     11-30    135  |  98  |  59<H>  ----------------------------<  298<H>  4.3   |  19<L>  |  2.25<H>    Ca    8.6      30 Nov 2017 07:05  Mg     2.3     11-30    MICROBIOLOGY:    11/25 RVP Neg    URINE CATHETER  11-25-17 NGTD    BLOOD PERIPHERAL  11-25-17 NGTD    URINE MIDSTREAM  11-18-17 NGTD    RADIOLOGY:    No new available

## 2017-11-30 NOTE — PROGRESS NOTE ADULT - SUBJECTIVE AND OBJECTIVE BOX
Subjective: SOB improved, no CP	    MEDICATIONS  (STANDING):  aspirin enteric coated 81 milliGRAM(s) Oral daily  clopidogrel Tablet 75 milliGRAM(s) Oral daily  docusate sodium 100 milliGRAM(s) Oral three times a day  furosemide    Tablet 40 milliGRAM(s) Oral daily  insulin glargine Injectable (LANTUS) 55 Unit(s) SubCutaneous at bedtime  insulin lispro (HumaLOG) corrective regimen sliding scale   SubCutaneous three times a day before meals  insulin lispro (HumaLOG) corrective regimen sliding scale   SubCutaneous at bedtime  insulin lispro Injectable (HumaLOG) 15 Unit(s) SubCutaneous three times a day before meals  levothyroxine 50 MICROGram(s) Oral daily  metoprolol succinate ER 12.5 milliGRAM(s) Oral daily  montelukast 10 milliGRAM(s) Oral at bedtime  pantoprazole    Tablet 40 milliGRAM(s) Oral two times a day before meals  polyethylene glycol 3350 17 Gram(s) Oral daily  senna 2 Tablet(s) Oral at bedtime  sucralfate suspension 1 Gram(s) Oral four times a day    MEDICATIONS  (PRN):  dextrose Gel 1 Dose(s) Oral once PRN Blood Glucose LESS THAN 70 milliGRAM(s)/deciliter  glucagon  Injectable 1 milliGRAM(s) IntraMuscular once PRN Glucose LESS THAN 70 milligrams/deciliter  glucagon  Injectable 1 milliGRAM(s) IntraMuscular once PRN Glucose LESS THAN 70 milligrams/deciliter      LABS:                        10.6   10.96 )-----------( 349      ( 30 Nov 2017 07:05 )             31.4     135  |  98  |  59<H>  ----------------------------<  298<H>  4.3   |  19<L>  |  2.25<H>    Ca    8.6      30 Nov 2017 07:05  Mg     2.3     11-30      Creatinine Trend: 2.25<--, 2.06<--, 2.33<--, 1.78<--, 1.90<--, 1.89<--   PTT - ( 29 Nov 2017 14:46 )  PTT:44.9 SEC  CARDIAC MARKERS ( 30 Nov 2017 07:05 )  x     / 4.09 ng/mL / 152 u/L / 4.27 ng/mL / x      CARDIAC MARKERS ( 29 Nov 2017 06:00 )  x     / 5.59 ng/mL / 234 u/L / x     / x      CARDIAC MARKERS ( 28 Nov 2017 14:22 )  x     / 6.44 ng/mL / 284 u/L / x     / x      CARDIAC MARKERS ( 27 Nov 2017 16:00 )  x     / 7.23 ng/mL / 258 u/L / 6.32 ng/mL / x        PHYSICAL EXAM  Vital Signs Last 24 Hrs  T(C): 36.6 (30 Nov 2017 06:00), Max: 36.6 (29 Nov 2017 13:57)  T(F): 97.9 (30 Nov 2017 06:00), Max: 97.9 (29 Nov 2017 13:57)  HR: 80 (30 Nov 2017 06:00) (80 - 94)  BP: 126/67 (30 Nov 2017 06:00) (119/63 - 142/57)  RR: 18 (30 Nov 2017 06:00) (17 - 18)  SpO2: 95% (30 Nov 2017 06:00) (95% - 100%)    Cardiovascular: Normal S1 S2,     No JVD, 1/6 YUSUF murmur, Peripheral pulses palpable 2+ bilaterally  Respiratory: Bibasilar crackles	  Gastrointestinal:  Soft, Non-tender, + BS	  Extremities no edema, cyanosis, clubbing B/L LE's     DIAGNOSTIC TESTING:    < from: Cardiac Cath Lab - Adult (10.13.17 @ 10:40) >  VENTRICLES: No left ventriculogram was performed.  CORONARY VESSELS: The coronary circulation is right dominant.  LM:   --  LM: Normal.  LAD:   --  Proximal LAD: There was a diffuse 60 % stenosis.  --  Mid LAD: There was a 100 % stenosis with the distal vessel being filled  via LIMA-LAD.  CX:   --  Circumflex: The vessel was small sized. Angiography showed mild  atherosclerosis withno flow limiting lesions.  --  OM1: Angiography showed mild atherosclerosis with no flow limiting  lesions.  RI:   --  Ostial ramus intermedius: There was a tubular 80 % stenosis.  There was PARUL grade 3 flow through the vessel (brisk flow).  RCA:   --  Proximal RCA: Angiography showed mild atherosclerosis with no  flow limiting lesions.  --  Mid RCA: There was a 100 % stenosis with the distal vessel being filled  via collaterals. This lesion is a chronic total occlusion.  GRAFTS:   --  Graft to the LAD: The graft was a LIMA. Graft angiography  showed minor luminal irregularities. Distal vessel angiography showed  minor luminal irregularities.  AORTA: Ascending aorta: Normal. No additional grafts visualized in  aortogram.  COMPLICATIONS: There were no complications.  DIAGNOSTIC RECOMMENDATIONS: Coronary angiogram demonstrates patent  LIMA-LAD, closed SVG grafts, and a severe stenosis in the ostial Ramus.  Will perform staged PCI to RI when Cr stable and renally optimized.  Prepared and signed by  Corie Ga M.D.  Signed 10/17/2017 13:49:15    < end of copied text >    < from: Cardiac Cath Lab - Adult (11.17.17 @ 11:48) >  PROCEDURE:  --  Intervention on ramus intermedius: drug-eluting stent.    VENTRICLES: No LV gram was performed; however, a recent echocardiogram  demonstrated normal global and regional LV function.  CORONARY VESSELS: The coronary circulation is right dominant.  LM:   --  LM: Angiography showed minor luminal irregularities with no flow  limiting lesions.  LAD:   --  Ostial LAD: There was a 100 % stenosis.  CX:   --  Circumflex: This vessel was not injected, but was visualized  during a prior cardiac catheterization.  RI:   --  Ramus intermedius: There was a 95 % stenosis.  RCA:   --  RCA: This vessel was not injected.  COMPLICATIONS: There were no complications.  DIAGNOSTIC RECOMMENDATIONS: The patient should continue with the present  medications. will add plavix 75mg po once a day/ will add ECASA 325mg po  once day.  Prepared and signed by  Roberta Quick M.D.  Signed 11/17/2017 13:16:01    < end of copied text >    < from: TTE with Doppler (w/Cont) (11.25.17 @ 12:33) >  CONCLUSIONS:  1. Mitral annular calcification, otherwise normal mitral  valve. Mild mitral regurgitation.  2. Normal left ventricular internal dimensions and wall  thicknesses.  3. Endocardium not well visualized; grossly moderate to  severe segmental left ventricularsystolic dysfunction.  Hypokinesis of the inferior, lateral, and inferolateral  walls.  Endocardial visualization enhanced with intravenous  injection of echo contrast (Definity).  No LV thrombus  seen.  4. The right ventricle is not well visualized;grossly  normal right ventricular systolic function.  ------------------------------------------------------------------------  Confirmed on  11/25/2017 - 14:44:41 by Jose Lucia M.D.    < end of copied text >      ASSESSMENT/PLAN: 	71y Female w/ PMH HTN, CKD, Hyperlipidemia, DM-II, CAD s/p 3V CABG  (LIMA to LAD, SVG to OM, SVG to PDA) at [facility] 2014, s/P CORNELIA TO [place] 11/17/17 presenting with shortness of breath, cough, Hypoxic in ED with O2 sats in the 70s, fevers, Leukocytosis and elevated lactate.  CXR showed pulmonary edema in ED. EKG with new intraventricular block and STD V3,V4,V5, CE elevated c/w NSTEMI    --ID input appreciated--observe off abx (suspected PNA on admit)  --Pulm edema/acute decomp systolic HF, now compensated, cont PO Lasix (per Renal)  --Check CXR PA/LAT today to eval bibasilar crackles still present  --CAD s/p CORNELIA to [place] 11/17/17. Cont ASA, Plavix.   --NSTEMI and TTE with new moderate to severe LV dysfxn --cont to trend CE  --Possible LHC in the next 24-48 hours if stable    Juliane MORRISC  [facility]  [address], [address]   [place] [address]  office [phone]  pager [phone]

## 2017-11-30 NOTE — PROGRESS NOTE ADULT - ASSESSMENT
71 F Georgian speaking female PMH HTN, CKD, Hyperlipidemia, DM-II, CAD s/p CABG, s/p stents presenting with shortness of breath. Patient had recent hospitalization with stent placement, now returns with chest pain, SOB. Never cough, no fevers, no sick contacts. Here had low grade fever to 100.5F, and leukocytosis (no left shift), elevated lactate and troponins. CXR with atelectasis. Lower suspicion for bacterial pneumonia. Fever/leukocytosis ? reactive to cardiac event. Presently off abx. Leukocytosis stable. Patient well. No signs infection.  - Continue off abx  - If signs worsening, signs pna or sepsis would re-start Vanco/Cefepime empiric therapy  - ACS as per cardiology team  - Will sign off. Please call with further questions or change in status.    Dell Angeles MD  Pager [phone]  After 5pm and on weekends call [phone]

## 2017-11-30 NOTE — PROGRESS NOTE ADULT - ASSESSMENT
1.	MERVIN, initially sec to cardio-renal syndrome. Renal function was improving with diuresing, then worsen possible sec to hyperglycemia. was improving now again worsen with hyperglycemia. patient clinically seemed to be euvolemic   2.	CKD stage 3: Baseline Scr 1.5-1.8  3.	CHF decompensation: Getting diuresed. Follow up cardiology  4.	Metabolic Acidosis: Better  5.	Hyponatremia: sec to hyperglycemia    PLAN:  1.	Continue lasix per cardiology  2.	Follow up renal function and electrolyte  3.	Planned for cardiac cath in 1-2 days if renal function improves. Start Mucomyst 1200mg BID for 4 dose, starting one day prior to the cardiac cath. Also give her NaHCO3 150meq/L in sterile water for 200cc/hr for one hr prior to cardiac cath and 75cc/hr for 6 hrs after the cardiac cath 1.	MERVIN, initially sec to cardio-renal syndrome. Renal function was improving with diuresing, then worsen possible sec to hyperglycemia. patient clinically seemed to be euvolemic   2.	CKD stage 3: Baseline Scr 1.5-1.8  3.	CHF decompensation: Getting diuresed. Follow up cardiology  4.	Metabolic Acidosis: Better  5.	Hyponatremia: sec to hyperglycemia    PLAN:  1.	Continue lasix per cardiology  2.	Follow up renal function and electrolyte  3.	Planned for cardiac cath in 1-2 days if renal function improves. Start Mucomyst 1200mg BID for 4 dose, starting one day prior to the cardiac cath. Also give her NaHCO3 150meq/L in sterile water for 200cc/hr for one hr prior to cardiac cath and 75cc/hr for 6 hrs after the cardiac cath

## 2017-11-30 NOTE — PROGRESS NOTE ADULT - SUBJECTIVE AND OBJECTIVE BOX
Chief complaint  Patient is a 71y old  Female who presents with a chief complaint of sob,chest pain (27 Nov 2017 11:05)   Review of systems  Patient in bed, looks comfortable, no fever, no hypoglycemia.    Labs and Fingersticks    CAPILLARY BLOOD GLUCOSE        Calcium, Total Serum: 8.6 (11-30 @ 07:05)  Calcium, Total Serum: 8.6 (11-29 @ 06:00)          11-30    135  |  98  |  59<H>  ----------------------------<  298<H>  4.3   |  19<L>  |  2.25<H>    Ca    8.6      30 Nov 2017 07:05  Mg     2.3     11-30                          10.6   10.96 )-----------( 349      ( 30 Nov 2017 07:05 )             31.4     Medications  MEDICATIONS  (STANDING):  aspirin enteric coated 81 milliGRAM(s) Oral daily  atorvastatin 40 milliGRAM(s) Oral at bedtime  clopidogrel Tablet 75 milliGRAM(s) Oral daily  dextrose 5%. 1000 milliLiter(s) (50 mL/Hr) IV Continuous <Continuous>  dextrose 50% Injectable 12.5 Gram(s) IV Push once  dextrose 50% Injectable 25 Gram(s) IV Push once  dextrose 50% Injectable 25 Gram(s) IV Push once  docusate sodium 100 milliGRAM(s) Oral three times a day  furosemide    Tablet 40 milliGRAM(s) Oral daily  insulin glargine Injectable (LANTUS) 55 Unit(s) SubCutaneous at bedtime  insulin lispro (HumaLOG) corrective regimen sliding scale   SubCutaneous three times a day before meals  insulin lispro (HumaLOG) corrective regimen sliding scale   SubCutaneous at bedtime  insulin lispro Injectable (HumaLOG) 15 Unit(s) SubCutaneous three times a day before meals  levothyroxine 50 MICROGram(s) Oral daily  metoprolol succinate ER 12.5 milliGRAM(s) Oral daily  montelukast 10 milliGRAM(s) Oral at bedtime  pantoprazole    Tablet 40 milliGRAM(s) Oral two times a day before meals  polyethylene glycol 3350 17 Gram(s) Oral daily  senna 2 Tablet(s) Oral at bedtime  sucralfate suspension 1 Gram(s) Oral four times a day      Physical Exam  General: Patient comfortable in bed  Vital Signs Last 12 Hrs  T(F): 98.7 (11-30-17 @ 14:23), Max: 98.7 (11-30-17 @ 14:23)  HR: 80 (11-30-17 @ 14:23) (80 - 80)  BP: 105/51 (11-30-17 @ 14:23) (105/51 - 126/67)  BP(mean): --  RR: 17 (11-30-17 @ 14:23) (17 - 18)  SpO2: 98% (11-30-17 @ 14:23) (95% - 98%)  Neck: No palpable thyroid nodules.  CVS: S1S2, No murmurs  Respiratory: No wheezing, no crepitations  GI: Abdomen soft, bowel sounds positive  Musculoskeletal: Positive edema lower extremities.   Skin: No skin rashes, no ecchymosis    Diagnostics

## 2017-11-30 NOTE — PROGRESS NOTE ADULT - SUBJECTIVE AND OBJECTIVE BOX
INTERVAL HPI/OVERNIGHT EVENTS:    pt reports no new events   tolerating diet   no abdominal pain   'okay okay'    MEDICATIONS  (STANDING):  aspirin enteric coated 81 milliGRAM(s) Oral daily  atorvastatin 40 milliGRAM(s) Oral at bedtime  clopidogrel Tablet 75 milliGRAM(s) Oral daily  dextrose 5%. 1000 milliLiter(s) (50 mL/Hr) IV Continuous <Continuous>  dextrose 50% Injectable 12.5 Gram(s) IV Push once  dextrose 50% Injectable 25 Gram(s) IV Push once  dextrose 50% Injectable 25 Gram(s) IV Push once  docusate sodium 100 milliGRAM(s) Oral three times a day  furosemide    Tablet 40 milliGRAM(s) Oral daily  insulin glargine Injectable (LANTUS) 55 Unit(s) SubCutaneous at bedtime  insulin lispro (HumaLOG) corrective regimen sliding scale   SubCutaneous three times a day before meals  insulin lispro (HumaLOG) corrective regimen sliding scale   SubCutaneous at bedtime  insulin lispro Injectable (HumaLOG) 15 Unit(s) SubCutaneous three times a day before meals  levothyroxine 50 MICROGram(s) Oral daily  metoprolol succinate ER 12.5 milliGRAM(s) Oral daily  montelukast 10 milliGRAM(s) Oral at bedtime  pantoprazole    Tablet 40 milliGRAM(s) Oral two times a day before meals  polyethylene glycol 3350 17 Gram(s) Oral daily  senna 2 Tablet(s) Oral at bedtime  sucralfate suspension 1 Gram(s) Oral four times a day    MEDICATIONS  (PRN):  dextrose Gel 1 Dose(s) Oral once PRN Blood Glucose LESS THAN 70 milliGRAM(s)/deciliter  glucagon  Injectable 1 milliGRAM(s) IntraMuscular once PRN Glucose LESS THAN 70 milligrams/deciliter  glucagon  Injectable 1 milliGRAM(s) IntraMuscular once PRN Glucose LESS THAN 70 milligrams/deciliter      Allergies    No Known Allergies    Intolerances        Review of Systems:    General:  No wt loss, fevers, chills, night sweats, fatigue   Eyes:  Good vision, no reported pain  ENT:  No sore throat, pain, runny nose, dysphagia  CV:  No pain, palpitations, hypo/hypertension  Resp:  No dyspnea, cough, tachypnea, wheezing  GI:  No pain, No nausea, No vomiting, No diarrhea, No constipation, No weight loss, No fever, No pruritis, No rectal bleeding, No melena, No dysphagia  :  No pain, bleeding, incontinence, nocturia  Muscle:  No pain, weakness  Neuro:  No weakness, tingling, memory problems  Psych:  No fatigue, insomnia, mood problems, depression  Endocrine:  No polyuria, polydypsia, cold/heat intolerance  Heme:  No petechiae, ecchymosis, easy bruisability  Skin:  No rash, tattoos, scars, edema      Vital Signs Last 24 Hrs  T(C): 36.6 (30 Nov 2017 06:00), Max: 36.6 (29 Nov 2017 13:57)  T(F): 97.9 (30 Nov 2017 06:00), Max: 97.9 (29 Nov 2017 13:57)  HR: 80 (30 Nov 2017 06:00) (80 - 94)  BP: 126/67 (30 Nov 2017 06:00) (119/63 - 142/57)  BP(mean): --  RR: 18 (30 Nov 2017 06:00) (17 - 18)  SpO2: 95% (30 Nov 2017 06:00) (95% - 100%)    PHYSICAL EXAM:    Constitutional: NAD  HEENT: EOMI, throat clear  Neck: No LAD, supple  Respiratory: CTA and P  Cardiovascular: S1 and S2, RRR, no M  Gastrointestinal: BS+, soft, NT/ND, neg HSM,  Extremities: No peripheral edema, neg clubbing, cyanosis  Vascular: 2+ peripheral pulses  Neurological: A/O x 3, no focal deficits  Psychiatric: Normal mood, normal affect  Skin: No rashes      LABS:                        10.6   10.96 )-----------( 349      ( 30 Nov 2017 07:05 )             31.4     11-30    135  |  98  |  59<H>  ----------------------------<  298<H>  4.3   |  19<L>  |  2.25<H>    Ca    8.6      30 Nov 2017 07:05  Mg     2.3     11-30      PTT - ( 29 Nov 2017 14:46 )  PTT:44.9 SEC      RADIOLOGY & ADDITIONAL TESTS:

## 2017-12-01 LAB
BASOPHILS # BLD AUTO: 0.06 K/UL — SIGNIFICANT CHANGE UP (ref 0–0.2)
BASOPHILS NFR BLD AUTO: 0.5 % — SIGNIFICANT CHANGE UP (ref 0–2)
BUN SERPL-MCNC: 60 MG/DL — HIGH (ref 7–23)
CALCIUM SERPL-MCNC: 9.6 MG/DL — SIGNIFICANT CHANGE UP (ref 8.4–10.5)
CHLORIDE SERPL-SCNC: 98 MMOL/L — SIGNIFICANT CHANGE UP (ref 98–107)
CO2 SERPL-SCNC: 19 MMOL/L — LOW (ref 22–31)
CREAT SERPL-MCNC: 2 MG/DL — HIGH (ref 0.5–1.3)
EOSINOPHIL # BLD AUTO: 0.62 K/UL — HIGH (ref 0–0.5)
EOSINOPHIL NFR BLD AUTO: 5.3 % — SIGNIFICANT CHANGE UP (ref 0–6)
GLUCOSE BLDC GLUCOMTR-MCNC: 131 MG/DL — HIGH (ref 70–99)
GLUCOSE BLDC GLUCOMTR-MCNC: 160 MG/DL — HIGH (ref 70–99)
GLUCOSE BLDC GLUCOMTR-MCNC: 205 MG/DL — HIGH (ref 70–99)
GLUCOSE BLDC GLUCOMTR-MCNC: 235 MG/DL — HIGH (ref 70–99)
GLUCOSE BLDC GLUCOMTR-MCNC: 318 MG/DL — HIGH (ref 70–99)
GLUCOSE SERPL-MCNC: 150 MG/DL — HIGH (ref 70–99)
HCT VFR BLD CALC: 30.7 % — LOW (ref 34.5–45)
HCT VFR BLD CALC: 30.7 % — LOW (ref 34.5–45)
HGB BLD-MCNC: 10.2 G/DL — LOW (ref 11.5–15.5)
HGB BLD-MCNC: 10.2 G/DL — LOW (ref 11.5–15.5)
IMM GRANULOCYTES # BLD AUTO: 0.15 # — SIGNIFICANT CHANGE UP
IMM GRANULOCYTES NFR BLD AUTO: 1.3 % — SIGNIFICANT CHANGE UP (ref 0–1.5)
LYMPHOCYTES # BLD AUTO: 29.7 % — SIGNIFICANT CHANGE UP (ref 13–44)
LYMPHOCYTES # BLD AUTO: 3.48 K/UL — HIGH (ref 1–3.3)
MAGNESIUM SERPL-MCNC: 2.3 MG/DL — SIGNIFICANT CHANGE UP (ref 1.6–2.6)
MCHC RBC-ENTMCNC: 29.4 PG — SIGNIFICANT CHANGE UP (ref 27–34)
MCHC RBC-ENTMCNC: 29.4 PG — SIGNIFICANT CHANGE UP (ref 27–34)
MCHC RBC-ENTMCNC: 33.2 % — SIGNIFICANT CHANGE UP (ref 32–36)
MCHC RBC-ENTMCNC: 33.2 % — SIGNIFICANT CHANGE UP (ref 32–36)
MCV RBC AUTO: 88.5 FL — SIGNIFICANT CHANGE UP (ref 80–100)
MCV RBC AUTO: 88.5 FL — SIGNIFICANT CHANGE UP (ref 80–100)
MONOCYTES # BLD AUTO: 1.1 K/UL — HIGH (ref 0–0.9)
MONOCYTES NFR BLD AUTO: 9.4 % — SIGNIFICANT CHANGE UP (ref 2–14)
NEUTROPHILS # BLD AUTO: 6.29 K/UL — SIGNIFICANT CHANGE UP (ref 1.8–7.4)
NEUTROPHILS NFR BLD AUTO: 53.8 % — SIGNIFICANT CHANGE UP (ref 43–77)
NRBC # FLD: 0 — SIGNIFICANT CHANGE UP
NRBC # FLD: 0 — SIGNIFICANT CHANGE UP
PLATELET # BLD AUTO: 347 K/UL — SIGNIFICANT CHANGE UP (ref 150–400)
PLATELET # BLD AUTO: 347 K/UL — SIGNIFICANT CHANGE UP (ref 150–400)
PMV BLD: 9.1 FL — SIGNIFICANT CHANGE UP (ref 7–13)
PMV BLD: 9.1 FL — SIGNIFICANT CHANGE UP (ref 7–13)
POTASSIUM SERPL-MCNC: 3.7 MMOL/L — SIGNIFICANT CHANGE UP (ref 3.5–5.3)
POTASSIUM SERPL-SCNC: 3.7 MMOL/L — SIGNIFICANT CHANGE UP (ref 3.5–5.3)
RBC # BLD: 3.47 M/UL — LOW (ref 3.8–5.2)
RBC # BLD: 3.47 M/UL — LOW (ref 3.8–5.2)
RBC # FLD: 13.6 % — SIGNIFICANT CHANGE UP (ref 10.3–14.5)
RBC # FLD: 13.6 % — SIGNIFICANT CHANGE UP (ref 10.3–14.5)
SODIUM SERPL-SCNC: 135 MMOL/L — SIGNIFICANT CHANGE UP (ref 135–145)
WBC # BLD: 11.7 K/UL — HIGH (ref 3.8–10.5)
WBC # BLD: 11.7 K/UL — HIGH (ref 3.8–10.5)
WBC # FLD AUTO: 11.7 K/UL — HIGH (ref 3.8–10.5)
WBC # FLD AUTO: 11.7 K/UL — HIGH (ref 3.8–10.5)

## 2017-12-01 PROCEDURE — 71250 CT THORAX DX C-: CPT | Mod: 26

## 2017-12-01 RX ORDER — ACETYLCYSTEINE 200 MG/ML
1200 VIAL (ML) MISCELLANEOUS
Qty: 0 | Refills: 0 | Status: COMPLETED | OUTPATIENT
Start: 2017-12-03 | End: 2017-12-04

## 2017-12-01 RX ORDER — INSULIN LISPRO 100/ML
18 VIAL (ML) SUBCUTANEOUS
Qty: 0 | Refills: 0 | Status: DISCONTINUED | OUTPATIENT
Start: 2017-12-01 | End: 2017-12-02

## 2017-12-01 RX ADMIN — Medication 1 GRAM(S): at 05:23

## 2017-12-01 RX ADMIN — Medication 1 GRAM(S): at 17:13

## 2017-12-01 RX ADMIN — SENNA PLUS 2 TABLET(S): 8.6 TABLET ORAL at 21:35

## 2017-12-01 RX ADMIN — Medication 15 UNIT(S): at 09:03

## 2017-12-01 RX ADMIN — PANTOPRAZOLE SODIUM 40 MILLIGRAM(S): 20 TABLET, DELAYED RELEASE ORAL at 17:13

## 2017-12-01 RX ADMIN — Medication 50 MICROGRAM(S): at 05:23

## 2017-12-01 RX ADMIN — Medication 2: at 09:03

## 2017-12-01 RX ADMIN — Medication 81 MILLIGRAM(S): at 12:41

## 2017-12-01 RX ADMIN — Medication 15 UNIT(S): at 12:42

## 2017-12-01 RX ADMIN — PANTOPRAZOLE SODIUM 40 MILLIGRAM(S): 20 TABLET, DELAYED RELEASE ORAL at 05:23

## 2017-12-01 RX ADMIN — Medication 1 GRAM(S): at 23:10

## 2017-12-01 RX ADMIN — Medication 1 GRAM(S): at 12:41

## 2017-12-01 RX ADMIN — Medication 4: at 12:42

## 2017-12-01 RX ADMIN — INSULIN GLARGINE 55 UNIT(S): 100 INJECTION, SOLUTION SUBCUTANEOUS at 22:08

## 2017-12-01 RX ADMIN — Medication 100 MILLIGRAM(S): at 05:23

## 2017-12-01 RX ADMIN — CLOPIDOGREL BISULFATE 75 MILLIGRAM(S): 75 TABLET, FILM COATED ORAL at 12:41

## 2017-12-01 RX ADMIN — Medication 1 GRAM(S): at 00:55

## 2017-12-01 RX ADMIN — MONTELUKAST 10 MILLIGRAM(S): 4 TABLET, CHEWABLE ORAL at 21:35

## 2017-12-01 RX ADMIN — ATORVASTATIN CALCIUM 40 MILLIGRAM(S): 80 TABLET, FILM COATED ORAL at 21:35

## 2017-12-01 RX ADMIN — Medication 40 MILLIGRAM(S): at 05:22

## 2017-12-01 RX ADMIN — Medication 12.5 MILLIGRAM(S): at 05:22

## 2017-12-01 RX ADMIN — Medication 100 MILLIGRAM(S): at 21:35

## 2017-12-01 RX ADMIN — Medication 18 UNIT(S): at 17:13

## 2017-12-01 NOTE — PROGRESS NOTE ADULT - SUBJECTIVE AND OBJECTIVE BOX
Patient with no anginal chest pain or shortness of breath      MEDICATIONS  (STANDING):  aspirin enteric coated 81 milliGRAM(s) Oral daily  atorvastatin 40 milliGRAM(s) Oral at bedtime  clopidogrel Tablet 75 milliGRAM(s) Oral daily  docusate sodium 100 milliGRAM(s) Oral three times a day  furosemide    Tablet 40 milliGRAM(s) Oral daily  insulin glargine Injectable (LANTUS) 55 Unit(s) SubCutaneous at bedtime  insulin lispro (HumaLOG) corrective regimen sliding scale   SubCutaneous three times a day before meals  insulin lispro (HumaLOG) corrective regimen sliding scale   SubCutaneous at bedtime  insulin lispro Injectable (HumaLOG) 18 Unit(s) SubCutaneous three times a day before meals  levothyroxine 50 MICROGram(s) Oral daily  metoprolol succinate ER 12.5 milliGRAM(s) Oral daily  montelukast 10 milliGRAM(s) Oral at bedtime  pantoprazole    Tablet 40 milliGRAM(s) Oral two times a day before meals  polyethylene glycol 3350 17 Gram(s) Oral daily  senna 2 Tablet(s) Oral at bedtime  sucralfate suspension 1 Gram(s) Oral four times a day    MEDICATIONS  (PRN):  dextrose Gel 1 Dose(s) Oral once PRN Blood Glucose LESS THAN 70 milliGRAM(s)/deciliter  glucagon  Injectable 1 milliGRAM(s) IntraMuscular once PRN Glucose LESS THAN 70 milligrams/deciliter  glucagon  Injectable 1 milliGRAM(s) IntraMuscular once PRN Glucose LESS THAN 70 milligrams/deciliter      LABS:                        10.2   11.70 )-----------( 347      ( 01 Dec 2017 05:37 )             30.7     Hemoglobin: 10.2 g/dL (12-01 @ 05:37)  Hemoglobin: 10.2 g/dL (12-01 @ 05:37)  Hemoglobin: 10.6 g/dL (11-30 @ 07:05)  Hemoglobin: 10.6 g/dL (11-30 @ 07:05)  Hemoglobin: 11.2 g/dL (11-29 @ 07:58)    12-01    135  |  98  |  60<H>  ----------------------------<  150<H>  3.7   |  19<L>  |  2.00<H>    Ca    9.6      01 Dec 2017 05:37  Mg     2.3     12-01      Creatinine Trend: 2.00<--, 2.25<--, 2.06<--, 2.33<--, 1.78<--, 1.90<--     CARDIAC MARKERS ( 30 Nov 2017 07:05 )  x     / 4.09 ng/mL / 152 u/L / 4.27 ng/mL / x      CARDIAC MARKERS ( 29 Nov 2017 06:00 )  x     / 5.59 ng/mL / 234 u/L / x     / x            PHYSICAL EXAM  Vital Signs Last 24 Hrs  T(C): 36.3 (01 Dec 2017 12:00), Max: 36.6 (01 Dec 2017 05:02)  T(F): 97.3 (01 Dec 2017 12:00), Max: 97.8 (01 Dec 2017 05:02)  HR: 85 (01 Dec 2017 12:00) (81 - 85)  BP: 117/67 (01 Dec 2017 12:00) (112/58 - 119/69)  BP(mean): --  RR: 18 (01 Dec 2017 12:00) (18 - 18)  SpO2: 98% (01 Dec 2017 12:00) (98% - 100%)        LABS:                        10.6   10.96 )-----------( 349      ( 30 Nov 2017 07:05 )             31.4     135  |  98  |  59<H>  ----------------------------<  298<H>  4.3   |  19<L>  |  2.25<H>    Ca    8.6      30 Nov 2017 07:05  Mg     2.3     11-30      Creatinine Trend: 2.25<--, 2.06<--, 2.33<--, 1.78<--, 1.90<--, 1.89<--   PTT - ( 29 Nov 2017 14:46 )  PTT:44.9 SEC  CARDIAC MARKERS ( 30 Nov 2017 07:05 )  x     / 4.09 ng/mL / 152 u/L / 4.27 ng/mL / x      CARDIAC MARKERS ( 29 Nov 2017 06:00 )  x     / 5.59 ng/mL / 234 u/L / x     / x      CARDIAC MARKERS ( 28 Nov 2017 14:22 )  x     / 6.44 ng/mL / 284 u/L / x     / x      CARDIAC MARKERS ( 27 Nov 2017 16:00 )  x     / 7.23 ng/mL / 258 u/L / 6.32 ng/mL / x        PHYSICAL EXAM  Vital Signs Last 24 Hrs  T(C): 36.6 (30 Nov 2017 06:00), Max: 36.6 (29 Nov 2017 13:57)  T(F): 97.9 (30 Nov 2017 06:00), Max: 97.9 (29 Nov 2017 13:57)  HR: 80 (30 Nov 2017 06:00) (80 - 94)  BP: 126/67 (30 Nov 2017 06:00) (119/63 - 142/57)  RR: 18 (30 Nov 2017 06:00) (17 - 18)  SpO2: 95% (30 Nov 2017 06:00) (95% - 100%)    Cardiovascular: Normal S1 S2,     No JVD, 1/6 YUSUF murmur, Peripheral pulses palpable 2+ bilaterally  Respiratory: Bibasilar crackles	  Gastrointestinal:  Soft, Non-tender, + BS	  Extremities no edema, cyanosis, clubbing B/L LE's     DIAGNOSTIC TESTING:    < from: Cardiac Cath Lab - Adult (10.13.17 @ 10:40) >  VENTRICLES: No left ventriculogram was performed.  CORONARY VESSELS: The coronary circulation is right dominant.  LM:   --  LM: Normal.  LAD:   --  Proximal LAD: There was a diffuse 60 % stenosis.  --  Mid LAD: There was a 100 % stenosis with the distal vessel being filled  via LIMA-LAD.  CX:   --  Circumflex: The vessel was small sized. Angiography showed mild  atherosclerosis withno flow limiting lesions.  --  OM1: Angiography showed mild atherosclerosis with no flow limiting  lesions.  RI:   --  Ostial ramus intermedius: There was a tubular 80 % stenosis.  There was PARUL grade 3 flow through the vessel (brisk flow).  RCA:   --  Proximal RCA: Angiography showed mild atherosclerosis with no  flow limiting lesions.  --  Mid RCA: There was a 100 % stenosis with the distal vessel being filled  via collaterals. This lesion is a chronic total occlusion.  GRAFTS:   --  Graft to the LAD: The graft was a LIMA. Graft angiography  showed minor luminal irregularities. Distal vessel angiography showed  minor luminal irregularities.  AORTA: Ascending aorta: Normal. No additional grafts visualized in  aortogram.  COMPLICATIONS: There were no complications.  DIAGNOSTIC RECOMMENDATIONS: Coronary angiogram demonstrates patent  LIMA-LAD, closed SVG grafts, and a severe stenosis in the ostial Ramus.  Will perform staged PCI to RI when Cr stable and renally optimized.  Prepared and signed by  Corie Ga M.D.  Signed 10/17/2017 13:49:15    < end of copied text >    < from: Cardiac Cath Lab - Adult (11.17.17 @ 11:48) >  PROCEDURE:  --  Intervention on ramus intermedius: drug-eluting stent.    VENTRICLES: No LV gram was performed; however, a recent echocardiogram  demonstrated normal global and regional LV function.  CORONARY VESSELS: The coronary circulation is right dominant.  LM:   --  LM: Angiography showed minor luminal irregularities with no flow  limiting lesions.  LAD:   --  Ostial LAD: There was a 100 % stenosis.  CX:   --  Circumflex: This vessel was not injected, but was visualized  during a prior cardiac catheterization.  RI:   --  Ramus intermedius: There was a 95 % stenosis.  RCA:   --  RCA: This vessel was not injected.  COMPLICATIONS: There were no complications.  DIAGNOSTIC RECOMMENDATIONS: The patient should continue with the present  medications. will add plavix 75mg po once a day/ will add ECASA 325mg po  once day.  Prepared and signed by  Roberta Quick M.D.  Signed 11/17/2017 13:16:01    < end of copied text >    < from: TTE with Doppler (w/Cont) (11.25.17 @ 12:33) >  CONCLUSIONS:  1. Mitral annular calcification, otherwise normal mitral  valve. Mild mitral regurgitation.  2. Normal left ventricular internal dimensions and wall  thicknesses.  3. Endocardium not well visualized; grossly moderate to  severe segmental left ventricularsystolic dysfunction.  Hypokinesis of the inferior, lateral, and inferolateral  walls.  Endocardial visualization enhanced with intravenous  injection of echo contrast (Definity).  No LV thrombus  seen.  4. The right ventricle is not well visualized;grossly  normal right ventricular systolic function.  ------------------------------------------------------------------------  Confirmed on  11/25/2017 - 14:44:41 by Jose Lucia M.D.    < end of copied text >      ASSESSMENT/PLAN: 	71y Female w/ PMH HTN, CKD, Hyperlipidemia, DM-II, CAD s/p 3V CABG  (LIMA to LAD, SVG to OM, SVG to PDA) at [facility] 2014, s/P CORNELIA TO [place] 11/17/17 presenting with shortness of breath, cough, Hypoxic in ED with O2 sats in the 70s, fevers, Leukocytosis and elevated lactate.  CXR showed pulmonary edema in ED. EKG with new intraventricular block and STD V3,V4,V5, CE elevated c/w NSTEMI    -- CXR noted - will check CT Chest to r/o PNA --> if + PNA please re-call ID as she is off ABX at this time   --Pulm edema/acute decomp systolic HF, now compensated, cont PO Lasix (per Renal)  --CAD s/p CORNELIA to RI 11/17/17. Cont ASA, Plavix.   --NSTEMI and TTE with new moderate to severe LV dysfxn --cont to trend CE  --plan for [facility] Monday 12/4/17  -- Renal noted - Mucomyst ordered to start [place] 12/3 - will d/w attending re: NaHCO3 gtt given severe LV dysfxn     [facility]  O:  [phone]  P: [phone]

## 2017-12-01 NOTE — PROGRESS NOTE ADULT - ASSESSMENT
1.	MERVIN, initially sec to cardio-renal syndrome. Renal function was improving with diuresing, then worsen possible sec to hyperglycemia, improving today. patient clinically seemed to be euvolemic   2.	CKD stage 3: Baseline Scr 1.5-1.8  3.	CHF decompensation: Getting diuresed. Follow up cardiology  4.	Metabolic Acidosis: Better  5.	Hyponatremia: sec to hyperglycemia    PLAN:  1.	Continue lasix per cardiology  2.	Follow up renal function and electrolyte  3.	Planned for cardiac cath in 1-2 days if renal function improves. Start Mucomyst 1200mg BID for 4 dose, starting one day prior to the cardiac cath. Also give her NaHCO3 150meq/L in sterile water for 200cc/hr for one hr prior to cardiac cath and 75cc/hr for 6 hrs after the cardiac cath 1.	MERVIN, initially sec to cardio-renal syndrome. Renal function was improving with diuresing, then worsen possible sec to hyperglycemia, improving today. patient clinically seemed to be euvolemic   2.	CKD stage 3: Baseline Scr 1.5-1.8  3.	CHF decompensation: Getting diuresed. Follow up cardiology  4.	Metabolic Acidosis: Better  5.	Hyponatremia: sec to hyperglycemia    PLAN:  1.	Continue lasix per cardiology  2.	Follow up renal function and electrolyte  3.	Planned for cardiac cath in 1-2 days if renal function improves. Start Mucomyst 1200mg BID for 4 doses, starting one day prior to the cardiac cath. Also give her NaHCO3 150meq/L in sterile water for 200cc/hr for one hr prior to cardiac cath and 75cc/hr for 6 hrs after the cardiac cath

## 2017-12-01 NOTE — PROGRESS NOTE ADULT - SUBJECTIVE AND OBJECTIVE BOX
Chief complaint  Patient is a 71y old  Female who presents with a chief complaint of sob,chest pain (27 Nov 2017 11:05)   Review of systems  Patient in bed, looks comfortable, no fever, no hypoglycemia.    Labs and Fingersticks    CAPILLARY BLOOD GLUCOSE        Calcium, Total Serum: 9.6 (12-01 @ 05:37)  Calcium, Total Serum: 8.6 (11-30 @ 07:05)          12-01    135  |  98  |  60<H>  ----------------------------<  150<H>  3.7   |  19<L>  |  2.00<H>    Ca    9.6      01 Dec 2017 05:37  Mg     2.3     12-01                          10.2   11.70 )-----------( 347      ( 01 Dec 2017 05:37 )             30.7     Medications  MEDICATIONS  (STANDING):  aspirin enteric coated 81 milliGRAM(s) Oral daily  atorvastatin 40 milliGRAM(s) Oral at bedtime  clopidogrel Tablet 75 milliGRAM(s) Oral daily  dextrose 5%. 1000 milliLiter(s) (50 mL/Hr) IV Continuous <Continuous>  dextrose 50% Injectable 12.5 Gram(s) IV Push once  dextrose 50% Injectable 25 Gram(s) IV Push once  dextrose 50% Injectable 25 Gram(s) IV Push once  docusate sodium 100 milliGRAM(s) Oral three times a day  furosemide    Tablet 40 milliGRAM(s) Oral daily  insulin glargine Injectable (LANTUS) 55 Unit(s) SubCutaneous at bedtime  insulin lispro (HumaLOG) corrective regimen sliding scale   SubCutaneous three times a day before meals  insulin lispro (HumaLOG) corrective regimen sliding scale   SubCutaneous at bedtime  insulin lispro Injectable (HumaLOG) 18 Unit(s) SubCutaneous three times a day before meals  levothyroxine 50 MICROGram(s) Oral daily  metoprolol succinate ER 12.5 milliGRAM(s) Oral daily  montelukast 10 milliGRAM(s) Oral at bedtime  pantoprazole    Tablet 40 milliGRAM(s) Oral two times a day before meals  polyethylene glycol 3350 17 Gram(s) Oral daily  senna 2 Tablet(s) Oral at bedtime  sucralfate suspension 1 Gram(s) Oral four times a day      Physical Exam  General: Patient comfortable in bed  Vital Signs Last 12 Hrs  T(F): 97.3 (12-01-17 @ 12:00), Max: 97.8 (12-01-17 @ 05:02)  HR: 85 (12-01-17 @ 12:00) (81 - 85)  BP: 117/67 (12-01-17 @ 12:00) (117/67 - 119/69)  BP(mean): --  RR: 18 (12-01-17 @ 12:00) (18 - 18)  SpO2: 98% (12-01-17 @ 12:00) (98% - 100%)  Neck: No palpable thyroid nodules.  CVS: S1S2, No murmurs  Respiratory: No wheezing, no crepitations  GI: Abdomen soft, bowel sounds positive  Musculoskeletal: Positive edema lower extremities.   Skin: No skin rashes, no ecchymosis    Diagnostics

## 2017-12-01 NOTE — PROGRESS NOTE ADULT - SUBJECTIVE AND OBJECTIVE BOX
Dr. Muhammad (Nephrology)  Office [phone]  Cell [phone]  [facility]  Cell [phone]      Patient is a 71y old  Female who presents with a chief complaint of sob,chest pain (27 Nov 2017 11:05)      Patient seen and examined at bedside. No chest pain/sob    VITALS:  T(F): 97.8 (12-01-17 @ 05:02), Max: 98.7 (11-30-17 @ 14:23)  HR: 81 (12-01-17 @ 05:02)  BP: 119/69 (12-01-17 @ 05:02)  RR: 18 (12-01-17 @ 05:02)  SpO2: 100% (12-01-17 @ 05:02)  Wt(kg): --    11-30 @ 07:01  -  12-01 @ 07:00  --------------------------------------------------------  IN: 0 mL / OUT: 280 mL / NET: -280 mL          PHYSICAL EXAM:  Constitutional: NAD  Neck: No JVD  Respiratory: CTAB, no wheezes, rales or rhonchi  Cardiovascular: S1, S2, RRR  Gastrointestinal: BS+, soft, NT/ND  Extremities: No peripheral edema    Hospital Medications:   MEDICATIONS  (STANDING):  aspirin enteric coated 81 milliGRAM(s) Oral daily  atorvastatin 40 milliGRAM(s) Oral at bedtime  clopidogrel Tablet 75 milliGRAM(s) Oral daily  dextrose 5%. 1000 milliLiter(s) (50 mL/Hr) IV Continuous <Continuous>  dextrose 50% Injectable 12.5 Gram(s) IV Push once  dextrose 50% Injectable 25 Gram(s) IV Push once  dextrose 50% Injectable 25 Gram(s) IV Push once  docusate sodium 100 milliGRAM(s) Oral three times a day  furosemide    Tablet 40 milliGRAM(s) Oral daily  insulin glargine Injectable (LANTUS) 55 Unit(s) SubCutaneous at bedtime  insulin lispro (HumaLOG) corrective regimen sliding scale   SubCutaneous three times a day before meals  insulin lispro (HumaLOG) corrective regimen sliding scale   SubCutaneous at bedtime  insulin lispro Injectable (HumaLOG) 15 Unit(s) SubCutaneous three times a day before meals  levothyroxine 50 MICROGram(s) Oral daily  metoprolol succinate ER 12.5 milliGRAM(s) Oral daily  montelukast 10 milliGRAM(s) Oral at bedtime  pantoprazole    Tablet 40 milliGRAM(s) Oral two times a day before meals  polyethylene glycol 3350 17 Gram(s) Oral daily  senna 2 Tablet(s) Oral at bedtime  sucralfate suspension 1 Gram(s) Oral four times a day      LABS:  12-01    135  |  98  |  60<H>  ----------------------------<  150<H>  3.7   |  19<L>  |  2.00<H>    Ca    9.6      01 Dec 2017 05:37  Mg     2.3     12-01      Creatinine Trend: 2.00 <--, 2.25 <--, 2.06 <--, 2.33 <--, 1.78 <--, 1.90 <--, 1.89 <--, 1.99 <--, 2.16 <--, 2.28 <--                                10.2   11.70 )-----------( 347      ( 01 Dec 2017 05:37 )             30.7     Urine Studies:  Urinalysis - [11-25-17 @ 01:00]      Color PLYEL / Appearance CLEAR / SG 1.020 / pH 6.0      Gluc >1000 / Ketone NEGATIVE  / Bili NEGATIVE / Urobili NORMAL       Blood TRACE / Protein 150 / Leuk Est NEGATIVE / Nitrite NEGATIVE      RBC 2-5 / WBC 10-25 / Hyaline 2-5 / Gran  / Sq Epi OCC / Non Sq Epi  / Bacteria FEW      HbA1c 9.1      [11-25-17 @ 06:30]  TSH 0.79      [11-25-17 @ 06:30]  Lipid: chol 136, , HDL 37, LDL 80      [11-25-17 @ 06:30]        RADIOLOGY & ADDITIONAL STUDIES: Dr. Muhammad (Nephrology)  Office [phone]  Cell [phone]  [facility]  Cell [phone]      Patient is a 71y old  Female who presents with a chief complaint of sob,chest pain       Patient seen and examined at bedside. No chest pain/sob    VITALS:  T(F): 97.8 (12-01-17 @ 05:02), Max: 98.7 (11-30-17 @ 14:23)  HR: 81 (12-01-17 @ 05:02)  BP: 119/69 (12-01-17 @ 05:02)  RR: 18 (12-01-17 @ 05:02)  SpO2: 100% (12-01-17 @ 05:02)  Wt(kg): --    11-30 @ 07:01  -  12-01 @ 07:00  --------------------------------------------------------  IN: 0 mL / OUT: 280 mL / NET: -280 mL          PHYSICAL EXAM:  Constitutional: NAD  Neck: No JVD  Respiratory: CTAB, no wheezes, rales or rhonchi  Cardiovascular: S1, S2, RRR  Gastrointestinal: BS+, soft, NT/ND  Extremities: No peripheral edema    Hospital Medications:   MEDICATIONS  (STANDING):  aspirin enteric coated 81 milliGRAM(s) Oral daily  atorvastatin 40 milliGRAM(s) Oral at bedtime  clopidogrel Tablet 75 milliGRAM(s) Oral daily  dextrose 5%. 1000 milliLiter(s) (50 mL/Hr) IV Continuous <Continuous>  dextrose 50% Injectable 12.5 Gram(s) IV Push once  dextrose 50% Injectable 25 Gram(s) IV Push once  dextrose 50% Injectable 25 Gram(s) IV Push once  docusate sodium 100 milliGRAM(s) Oral three times a day  furosemide    Tablet 40 milliGRAM(s) Oral daily  insulin glargine Injectable (LANTUS) 55 Unit(s) SubCutaneous at bedtime  insulin lispro (HumaLOG) corrective regimen sliding scale   SubCutaneous three times a day before meals  insulin lispro (HumaLOG) corrective regimen sliding scale   SubCutaneous at bedtime  insulin lispro Injectable (HumaLOG) 15 Unit(s) SubCutaneous three times a day before meals  levothyroxine 50 MICROGram(s) Oral daily  metoprolol succinate ER 12.5 milliGRAM(s) Oral daily  montelukast 10 milliGRAM(s) Oral at bedtime  pantoprazole    Tablet 40 milliGRAM(s) Oral two times a day before meals  polyethylene glycol 3350 17 Gram(s) Oral daily  senna 2 Tablet(s) Oral at bedtime  sucralfate suspension 1 Gram(s) Oral four times a day      LABS:  12-01    135  |  98  |  60<H>  ----------------------------<  150<H>  3.7   |  19<L>  |  2.00<H>    Ca    9.6      01 Dec 2017 05:37  Mg     2.3     12-01      Creatinine Trend: 2.00 <--, 2.25 <--, 2.06 <--, 2.33 <--, 1.78 <--, 1.90 <--, 1.89 <--, 1.99 <--, 2.16 <--, 2.28 <--                                10.2   11.70 )-----------( 347      ( 01 Dec 2017 05:37 )             30.7     Urine Studies:  Urinalysis - [11-25-17 @ 01:00]      Color PLYEL / Appearance CLEAR / SG 1.020 / pH 6.0      Gluc >1000 / Ketone NEGATIVE  / Bili NEGATIVE / Urobili NORMAL       Blood TRACE / Protein 150 / Leuk Est NEGATIVE / Nitrite NEGATIVE      RBC 2-5 / WBC 10-25 / Hyaline 2-5 / Gran  / Sq Epi OCC / Non Sq Epi  / Bacteria FEW      HbA1c 9.1      [11-25-17 @ 06:30]  TSH 0.79      [11-25-17 @ 06:30]  Lipid: chol 136, , HDL 37, LDL 80      [11-25-17 @ 06:30]        RADIOLOGY & ADDITIONAL STUDIES:

## 2017-12-01 NOTE — PROGRESS NOTE ADULT - SUBJECTIVE AND OBJECTIVE BOX
INTERVAL HPI/OVERNIGHT EVENTS:    pt is without abdominal complaints  denies n/v/d/c, abdominal pain, melena or brbpr     MEDICATIONS  (STANDING):  aspirin enteric coated 81 milliGRAM(s) Oral daily  atorvastatin 40 milliGRAM(s) Oral at bedtime  clopidogrel Tablet 75 milliGRAM(s) Oral daily  dextrose 5%. 1000 milliLiter(s) (50 mL/Hr) IV Continuous <Continuous>  dextrose 50% Injectable 12.5 Gram(s) IV Push once  dextrose 50% Injectable 25 Gram(s) IV Push once  dextrose 50% Injectable 25 Gram(s) IV Push once  docusate sodium 100 milliGRAM(s) Oral three times a day  furosemide    Tablet 40 milliGRAM(s) Oral daily  insulin glargine Injectable (LANTUS) 55 Unit(s) SubCutaneous at bedtime  insulin lispro (HumaLOG) corrective regimen sliding scale   SubCutaneous three times a day before meals  insulin lispro (HumaLOG) corrective regimen sliding scale   SubCutaneous at bedtime  insulin lispro Injectable (HumaLOG) 15 Unit(s) SubCutaneous three times a day before meals  levothyroxine 50 MICROGram(s) Oral daily  metoprolol succinate ER 12.5 milliGRAM(s) Oral daily  montelukast 10 milliGRAM(s) Oral at bedtime  pantoprazole    Tablet 40 milliGRAM(s) Oral two times a day before meals  polyethylene glycol 3350 17 Gram(s) Oral daily  senna 2 Tablet(s) Oral at bedtime  sucralfate suspension 1 Gram(s) Oral four times a day    MEDICATIONS  (PRN):  dextrose Gel 1 Dose(s) Oral once PRN Blood Glucose LESS THAN 70 milliGRAM(s)/deciliter  glucagon  Injectable 1 milliGRAM(s) IntraMuscular once PRN Glucose LESS THAN 70 milligrams/deciliter  glucagon  Injectable 1 milliGRAM(s) IntraMuscular once PRN Glucose LESS THAN 70 milligrams/deciliter      Allergies    No Known Allergies    Intolerances        Review of Systems:    General:  No wt loss, fevers, chills, night sweats, fatigue   Eyes:  Good vision, no reported pain  ENT:  No sore throat, pain, runny nose, dysphagia  CV:  No pain, palpitations, hypo/hypertension  Resp:  No dyspnea, cough, tachypnea, wheezing  GI:  No pain, No nausea, No vomiting, No diarrhea, No constipation, No weight loss, No fever, No pruritis, No rectal bleeding, No melena, No dysphagia  :  No pain, bleeding, incontinence, nocturia  Muscle:  No pain, weakness  Neuro:  No weakness, tingling, memory problems  Psych:  No fatigue, insomnia, mood problems, depression  Endocrine:  No polyuria, polydypsia, cold/heat intolerance  Heme:  No petechiae, ecchymosis, easy bruisability  Skin:  No rash, tattoos, scars, edema      Vital Signs Last 24 Hrs  T(C): 36.6 (01 Dec 2017 05:02), Max: 37.1 (30 Nov 2017 14:23)  T(F): 97.8 (01 Dec 2017 05:02), Max: 98.7 (30 Nov 2017 14:23)  HR: 81 (01 Dec 2017 05:02) (80 - 84)  BP: 119/69 (01 Dec 2017 05:02) (105/51 - 119/69)  BP(mean): --  RR: 18 (01 Dec 2017 05:02) (17 - 18)  SpO2: 100% (01 Dec 2017 05:02) (98% - 100%)    PHYSICAL EXAM:    Constitutional: NAD  HEENT: EOMI, throat clear  Neck: No LAD, supple  Respiratory: CTA and P  Cardiovascular: S1 and S2, RRR, no M  Gastrointestinal: BS+, soft, NT/ND, neg HSM,  Extremities: No peripheral edema, neg clubbing, cyanosis  Vascular: 2+ peripheral pulses  Neurological: A/O x 3, no focal deficits  Psychiatric: Normal mood, normal affect  Skin: No rashes      LABS:                        10.2   11.70 )-----------( 347      ( 01 Dec 2017 05:37 )             30.7     12-01    135  |  98  |  60<H>  ----------------------------<  150<H>  3.7   |  19<L>  |  2.00<H>    Ca    9.6      01 Dec 2017 05:37  Mg     2.3     12-01      PTT - ( 29 Nov 2017 14:46 )  PTT:44.9 SEC      RADIOLOGY & ADDITIONAL TESTS:

## 2017-12-02 LAB
BUN SERPL-MCNC: 61 MG/DL — HIGH (ref 7–23)
CALCIUM SERPL-MCNC: 9.4 MG/DL — SIGNIFICANT CHANGE UP (ref 8.4–10.5)
CHLORIDE SERPL-SCNC: 97 MMOL/L — LOW (ref 98–107)
CO2 SERPL-SCNC: 19 MMOL/L — LOW (ref 22–31)
CREAT SERPL-MCNC: 2.01 MG/DL — HIGH (ref 0.5–1.3)
GLUCOSE BLDC GLUCOMTR-MCNC: 147 MG/DL — HIGH (ref 70–99)
GLUCOSE BLDC GLUCOMTR-MCNC: 229 MG/DL — HIGH (ref 70–99)
GLUCOSE BLDC GLUCOMTR-MCNC: 233 MG/DL — HIGH (ref 70–99)
GLUCOSE BLDC GLUCOMTR-MCNC: 264 MG/DL — HIGH (ref 70–99)
GLUCOSE SERPL-MCNC: 259 MG/DL — HIGH (ref 70–99)
HCT VFR BLD CALC: 32.7 % — LOW (ref 34.5–45)
HGB BLD-MCNC: 10.5 G/DL — LOW (ref 11.5–15.5)
MAGNESIUM SERPL-MCNC: 2.3 MG/DL — SIGNIFICANT CHANGE UP (ref 1.6–2.6)
MCHC RBC-ENTMCNC: 28.8 PG — SIGNIFICANT CHANGE UP (ref 27–34)
MCHC RBC-ENTMCNC: 32.1 % — SIGNIFICANT CHANGE UP (ref 32–36)
MCV RBC AUTO: 89.8 FL — SIGNIFICANT CHANGE UP (ref 80–100)
NRBC # FLD: 0 — SIGNIFICANT CHANGE UP
PLATELET # BLD AUTO: 369 K/UL — SIGNIFICANT CHANGE UP (ref 150–400)
PMV BLD: 9.3 FL — SIGNIFICANT CHANGE UP (ref 7–13)
POTASSIUM SERPL-MCNC: 3.9 MMOL/L — SIGNIFICANT CHANGE UP (ref 3.5–5.3)
POTASSIUM SERPL-SCNC: 3.9 MMOL/L — SIGNIFICANT CHANGE UP (ref 3.5–5.3)
RBC # BLD: 3.64 M/UL — LOW (ref 3.8–5.2)
RBC # FLD: 13.5 % — SIGNIFICANT CHANGE UP (ref 10.3–14.5)
SODIUM SERPL-SCNC: 134 MMOL/L — LOW (ref 135–145)
WBC # BLD: 11.26 K/UL — HIGH (ref 3.8–10.5)
WBC # FLD AUTO: 11.26 K/UL — HIGH (ref 3.8–10.5)

## 2017-12-02 RX ORDER — INSULIN LISPRO 100/ML
20 VIAL (ML) SUBCUTANEOUS
Qty: 0 | Refills: 0 | Status: DISCONTINUED | OUTPATIENT
Start: 2017-12-02 | End: 2017-12-03

## 2017-12-02 RX ADMIN — ATORVASTATIN CALCIUM 40 MILLIGRAM(S): 80 TABLET, FILM COATED ORAL at 21:35

## 2017-12-02 RX ADMIN — PANTOPRAZOLE SODIUM 40 MILLIGRAM(S): 20 TABLET, DELAYED RELEASE ORAL at 05:14

## 2017-12-02 RX ADMIN — Medication 6: at 08:56

## 2017-12-02 RX ADMIN — Medication 100 MILLIGRAM(S): at 12:34

## 2017-12-02 RX ADMIN — Medication 1 GRAM(S): at 17:26

## 2017-12-02 RX ADMIN — Medication 50 MICROGRAM(S): at 05:14

## 2017-12-02 RX ADMIN — Medication 4: at 12:33

## 2017-12-02 RX ADMIN — Medication 12.5 MILLIGRAM(S): at 11:22

## 2017-12-02 RX ADMIN — PANTOPRAZOLE SODIUM 40 MILLIGRAM(S): 20 TABLET, DELAYED RELEASE ORAL at 17:26

## 2017-12-02 RX ADMIN — POLYETHYLENE GLYCOL 3350 17 GRAM(S): 17 POWDER, FOR SOLUTION ORAL at 11:22

## 2017-12-02 RX ADMIN — Medication 18 UNIT(S): at 08:57

## 2017-12-02 RX ADMIN — MONTELUKAST 10 MILLIGRAM(S): 4 TABLET, CHEWABLE ORAL at 21:34

## 2017-12-02 RX ADMIN — Medication 1 GRAM(S): at 05:14

## 2017-12-02 RX ADMIN — Medication 4: at 17:26

## 2017-12-02 RX ADMIN — Medication 18 UNIT(S): at 12:34

## 2017-12-02 RX ADMIN — Medication 81 MILLIGRAM(S): at 11:21

## 2017-12-02 RX ADMIN — Medication 100 MILLIGRAM(S): at 05:14

## 2017-12-02 RX ADMIN — Medication 20 UNIT(S): at 17:26

## 2017-12-02 RX ADMIN — INSULIN GLARGINE 55 UNIT(S): 100 INJECTION, SOLUTION SUBCUTANEOUS at 21:35

## 2017-12-02 RX ADMIN — CLOPIDOGREL BISULFATE 75 MILLIGRAM(S): 75 TABLET, FILM COATED ORAL at 11:22

## 2017-12-02 RX ADMIN — Medication 100 MILLIGRAM(S): at 21:34

## 2017-12-02 RX ADMIN — Medication 40 MILLIGRAM(S): at 05:14

## 2017-12-02 RX ADMIN — SENNA PLUS 2 TABLET(S): 8.6 TABLET ORAL at 21:35

## 2017-12-02 RX ADMIN — Medication 1 GRAM(S): at 11:22

## 2017-12-02 NOTE — PROGRESS NOTE ADULT - SUBJECTIVE AND OBJECTIVE BOX
INTERVAL HPI/OVERNIGHT EVENTS:    denies n/v/d/c, abdominal pain, melena or brbpr     MEDICATIONS  (STANDING):  aspirin enteric coated 81 milliGRAM(s) Oral daily  atorvastatin 40 milliGRAM(s) Oral at bedtime  clopidogrel Tablet 75 milliGRAM(s) Oral daily  dextrose 5%. 1000 milliLiter(s) (50 mL/Hr) IV Continuous <Continuous>  dextrose 50% Injectable 12.5 Gram(s) IV Push once  dextrose 50% Injectable 25 Gram(s) IV Push once  dextrose 50% Injectable 25 Gram(s) IV Push once  docusate sodium 100 milliGRAM(s) Oral three times a day  furosemide    Tablet 40 milliGRAM(s) Oral daily  insulin glargine Injectable (LANTUS) 55 Unit(s) SubCutaneous at bedtime  insulin lispro (HumaLOG) corrective regimen sliding scale   SubCutaneous three times a day before meals  insulin lispro (HumaLOG) corrective regimen sliding scale   SubCutaneous at bedtime  insulin lispro Injectable (HumaLOG) 18 Unit(s) SubCutaneous three times a day before meals  levothyroxine 50 MICROGram(s) Oral daily  metoprolol succinate ER 12.5 milliGRAM(s) Oral daily  montelukast 10 milliGRAM(s) Oral at bedtime  pantoprazole    Tablet 40 milliGRAM(s) Oral two times a day before meals  polyethylene glycol 3350 17 Gram(s) Oral daily  senna 2 Tablet(s) Oral at bedtime  sucralfate suspension 1 Gram(s) Oral four times a day    MEDICATIONS  (PRN):  dextrose Gel 1 Dose(s) Oral once PRN Blood Glucose LESS THAN 70 milliGRAM(s)/deciliter  glucagon  Injectable 1 milliGRAM(s) IntraMuscular once PRN Glucose LESS THAN 70 milligrams/deciliter  glucagon  Injectable 1 milliGRAM(s) IntraMuscular once PRN Glucose LESS THAN 70 milligrams/deciliter      Allergies    No Known Allergies    Intolerances        Review of Systems:    General:  No wt loss, fevers, chills, night sweats, fatigue   Eyes:  Good vision, no reported pain  ENT:  No sore throat, pain, runny nose, dysphagia  CV:  No pain, palpitations, hypo/hypertension  Resp:  No dyspnea, cough, tachypnea, wheezing  GI:  No pain, No nausea, No vomiting, No diarrhea, No constipation, No weight loss, No fever, No pruritis, No rectal bleeding, No melena, No dysphagia  :  No pain, bleeding, incontinence, nocturia  Muscle:  No pain, weakness  Neuro:  No weakness, tingling, memory problems  Psych:  No fatigue, insomnia, mood problems, depression  Endocrine:  No polyuria, polydypsia, cold/heat intolerance  Heme:  No petechiae, ecchymosis, easy bruisability  Skin:  No rash, tattoos, scars, edema      Vital Signs Last 24 Hrs  T(C): 36.3 (02 Dec 2017 05:06), Max: 36.9 (01 Dec 2017 21:10)  T(F): 97.3 (02 Dec 2017 05:06), Max: 98.5 (01 Dec 2017 21:10)  HR: 79 (02 Dec 2017 05:06) (79 - 90)  BP: 103/60 (02 Dec 2017 05:06) (103/60 - 117/67)  BP(mean): --  RR: 18 (02 Dec 2017 05:06) (18 - 18)  SpO2: 100% (02 Dec 2017 05:06) (97% - 100%)    PHYSICAL EXAM:    Constitutional: NAD  HEENT: EOMI, throat clear  Neck: No LAD, supple  Respiratory: CTA and P  Cardiovascular: S1 and S2, RRR, no M  Gastrointestinal: BS+, soft, NT/ND, neg HSM,  Extremities: No peripheral edema, neg clubbing, cyanosis  Vascular: 2+ peripheral pulses  Neurological: A/O x 3, no focal deficits  Psychiatric: Normal mood, normal affect  Skin: No rashes      LABS:                        10.5   11.26 )-----------( 369      ( 02 Dec 2017 06:30 )             32.7     12-02    134<L>  |  97<L>  |  61<H>  ----------------------------<  259<H>  3.9   |  19<L>  |  2.01<H>    Ca    9.4      02 Dec 2017 06:30  Mg     2.3     12-02            RADIOLOGY & ADDITIONAL TESTS:

## 2017-12-02 NOTE — PROGRESS NOTE ADULT - SUBJECTIVE AND OBJECTIVE BOX
ALETHEAHANNAHSELVIN  HPI:  This is a [facility] woman who was admitted initially to CCU with dyspnea and chest pains. She was diuresed and is now awaiting coronary angiogram She feels better.  She is known th have CKD which has worsen somewhat since her hospitalization.    HEALTH ISSUES - PROBLEM Dx:  Constipation, slow transit: Constipation, slow transit  Type 2 diabetes mellitus with other neurologic complication, without long-term current use of insulin: Type 2 diabetes mellitus with other neurologic complication, without long-term current use of insulin  Gastroesophageal reflux disease without esophagitis: Gastroesophageal reflux disease without esophagitis  Systolic heart failure, unspecified heart failure chronicity: Systolic heart failure, unspecified heart failure chronicity  Need for prophylactic measure: Need for prophylactic measure  Stage 3 chronic kidney disease: Stage 3 chronic kidney disease  Acute systolic heart failure: Acute systolic heart failure  Type 2 diabetes mellitus with hyperglycemia, unspecified long term insulin use status: Type 2 diabetes mellitus with hyperglycemia, unspecified long term insulin use status  Diabetes mellitus, type 2: Diabetes mellitus, type 2  Hyperlipidemia: Hyperlipidemia  Hypothyroidism: Hypothyroidism  CAD (coronary artery disease): CAD (coronary artery disease)  Pneumonia: Pneumonia  NSTEMI (non-ST elevated myocardial infarction): NSTEMI (non-ST elevated myocardial infarction)  Pulmonary edema cardiac cause: Pulmonary edema cardiac cause        MEDICATIONS  (STANDING):  aspirin enteric coated 81 milliGRAM(s) Oral daily  atorvastatin 40 milliGRAM(s) Oral at bedtime  clopidogrel Tablet 75 milliGRAM(s) Oral daily  dextrose 5%. 1000 milliLiter(s) (50 mL/Hr) IV Continuous <Continuous>  dextrose 50% Injectable 12.5 Gram(s) IV Push once  dextrose 50% Injectable 25 Gram(s) IV Push once  dextrose 50% Injectable 25 Gram(s) IV Push once  docusate sodium 100 milliGRAM(s) Oral three times a day  furosemide    Tablet 40 milliGRAM(s) Oral daily  insulin glargine Injectable (LANTUS) 55 Unit(s) SubCutaneous at bedtime  insulin lispro (HumaLOG) corrective regimen sliding scale   SubCutaneous three times a day before meals  insulin lispro (HumaLOG) corrective regimen sliding scale   SubCutaneous at bedtime  insulin lispro Injectable (HumaLOG) 20 Unit(s) SubCutaneous three times a day before meals  levothyroxine 50 MICROGram(s) Oral daily  metoprolol succinate ER 12.5 milliGRAM(s) Oral daily  montelukast 10 milliGRAM(s) Oral at bedtime  pantoprazole    Tablet 40 milliGRAM(s) Oral two times a day before meals  polyethylene glycol 3350 17 Gram(s) Oral daily  senna 2 Tablet(s) Oral at bedtime  sucralfate suspension 1 Gram(s) Oral four times a day    MEDICATIONS  (PRN):  dextrose Gel 1 Dose(s) Oral once PRN Blood Glucose LESS THAN 70 milliGRAM(s)/deciliter  glucagon  Injectable 1 milliGRAM(s) IntraMuscular once PRN Glucose LESS THAN 70 milligrams/deciliter  glucagon  Injectable 1 milliGRAM(s) IntraMuscular once PRN Glucose LESS THAN 70 milligrams/deciliter    Vital Signs Last 24 Hrs  T(C): 36.5 (02 Dec 2017 14:15), Max: 36.9 (01 Dec 2017 21:10)  T(F): 97.7 (02 Dec 2017 14:15), Max: 98.5 (01 Dec 2017 21:10)  HR: 77 (02 Dec 2017 14:15) (77 - 90)  BP: 105/60 (02 Dec 2017 14:15) (103/60 - 117/62)  BP(mean): --  RR: 18 (02 Dec 2017 14:15) (18 - 18)  SpO2: 99% (02 Dec 2017 14:15) (97% - 100%)  Daily     Daily Weight in k.7 (02 Dec 2017 05:06)    PHYSICAL EXAM:  Constitutional: She appears comfortable and not distressed. Not diaphoretic.    Neck:  The thyroid is normal. Trachea is midline.     Respiratory: The lungs are clear to auscultation. No dullness and expansion is normal.    Cardiovascular: S1 and S2 are normal. No mummurs, rubs or gallops are present.    Gastrointestinal: The abdomen is soft. No tenderness is present. No masses are present. Bowel sounds are normal.    Genitourinary: The bladder is not distended. No CVA tenderness is present.    Extremities: No edema is noted. No deformities are present.    Neurological: Cognition is normal. Tone, power and sensation are normal. Gait is steady.    Skin: No leasions are seen  or palpated.    Lymph Nodes: No lymphadenopathy is present.    Psychiatric: Mood is appropriate. No hallucinations or flight of ideas are noted.                              10.5   11.26 )-----------( 369      ( 02 Dec 2017 06:30 )             32.7     12    134<L>  |  97<L>  |  61<H>  ----------------------------<  259<H>  3.9   |  19<L>  |  2.01<H>    Ca    9.4      02 Dec 2017 06:30  Mg     2.3

## 2017-12-02 NOTE — PROGRESS NOTE ADULT - PROBLEM SELECTOR PLAN 1
- NSTEMI and TTE with new moderate to severe LV dysfxn  - cardiology work up appreciated  - keep on ppi for gi ppx while in a/c  - plans for eventual cardiac catheterization monday per cardiology

## 2017-12-02 NOTE — PROGRESS NOTE ADULT - ASSESSMENT
1.	CKD stage 3.  2.	MERVIN, due to CHF decompensation and its treatment.   3.	Chest pains, awaiting coronary angiogram.  4.	Anemia, multifactorial in etiology.  5.	CHF decompensation, improve and is furosemide 40 mg PO daily.    PLAN:  1.	Continue furosemide but hold the day on angiography.  2.	Low Na diet.  3.	Follow up BMP.  4.	Hydration protocol will be discussed once angiogram is scheduled.    Edwin Garcia MD  [phone]

## 2017-12-02 NOTE — PROGRESS NOTE ADULT - SUBJECTIVE AND OBJECTIVE BOX
Patient denies  anginal chest pain or shortness of breath    MEDICATIONS  (STANDING):  aspirin enteric coated 81 milliGRAM(s) Oral daily  atorvastatin 40 milliGRAM(s) Oral at bedtime  clopidogrel Tablet 75 milliGRAM(s) Oral daily  dextrose 5%. 1000 milliLiter(s) (50 mL/Hr) IV Continuous <Continuous>  dextrose 50% Injectable 12.5 Gram(s) IV Push once  dextrose 50% Injectable 25 Gram(s) IV Push once  dextrose 50% Injectable 25 Gram(s) IV Push once  docusate sodium 100 milliGRAM(s) Oral three times a day  furosemide    Tablet 40 milliGRAM(s) Oral daily  insulin glargine Injectable (LANTUS) 55 Unit(s) SubCutaneous at bedtime  insulin lispro (HumaLOG) corrective regimen sliding scale   SubCutaneous three times a day before meals  insulin lispro (HumaLOG) corrective regimen sliding scale   SubCutaneous at bedtime  insulin lispro Injectable (HumaLOG) 18 Unit(s) SubCutaneous three times a day before meals  levothyroxine 50 MICROGram(s) Oral daily  metoprolol succinate ER 12.5 milliGRAM(s) Oral daily  montelukast 10 milliGRAM(s) Oral at bedtime  pantoprazole    Tablet 40 milliGRAM(s) Oral two times a day before meals  polyethylene glycol 3350 17 Gram(s) Oral daily  senna 2 Tablet(s) Oral at bedtime  sucralfate suspension 1 Gram(s) Oral four times a day    MEDICATIONS  (PRN):  dextrose Gel 1 Dose(s) Oral once PRN Blood Glucose LESS THAN 70 milliGRAM(s)/deciliter  glucagon  Injectable 1 milliGRAM(s) IntraMuscular once PRN Glucose LESS THAN 70 milligrams/deciliter  glucagon  Injectable 1 milliGRAM(s) IntraMuscular once PRN Glucose LESS THAN 70 milligrams/deciliter      LABS:                        10.5   11.26 )-----------( 369      ( 02 Dec 2017 06:30 )             32.7     Hemoglobin: 10.5 g/dL (12-02 @ 06:30)  Hemoglobin: 10.2 g/dL (12-01 @ 05:37)  Hemoglobin: 10.2 g/dL (12-01 @ 05:37)  Hemoglobin: 10.6 g/dL (11-30 @ 07:05)  Hemoglobin: 10.6 g/dL (11-30 @ 07:05)    12-02    134<L>  |  97<L>  |  61<H>  ----------------------------<  259<H>  3.9   |  19<L>  |  2.01<H>    Ca    9.4      02 Dec 2017 06:30  Mg     2.3     12-02      Creatinine Trend: 2.01<--, 2.00<--, 2.25<--, 2.06<--, 2.33<--, 1.78<--     CARDIAC MARKERS ( 30 Nov 2017 07:05 )  x     / 4.09 ng/mL / 152 u/L / 4.27 ng/mL / x            PHYSICAL EXAM  Vital Signs Last 24 Hrs  T(C): 36.5 (02 Dec 2017 14:15), Max: 36.9 (01 Dec 2017 21:10)  T(F): 97.7 (02 Dec 2017 14:15), Max: 98.5 (01 Dec 2017 21:10)  HR: 77 (02 Dec 2017 14:15) (77 - 90)  BP: 105/60 (02 Dec 2017 14:15) (103/60 - 117/62)  BP(mean): --  RR: 18 (02 Dec 2017 14:15) (18 - 18)  SpO2: 99% (02 Dec 2017 14:15) (97% - 100%)      Cardiovascular: Normal S1 S2,     No JVD, 1/6 YUSUF murmur, Peripheral pulses palpable 2+ bilaterally  Respiratory: Bibasilar crackles	  Gastrointestinal:  Soft, Non-tender, + BS	  Extremities no edema, cyanosis, clubbing B/L LE's     DIAGNOSTIC TESTING:    < from: Cardiac Cath Lab - Adult (10.13.17 @ 10:40) >  VENTRICLES: No left ventriculogram was performed.  CORONARY VESSELS: The coronary circulation is right dominant.  LM:   --  LM: Normal.  LAD:   --  Proximal LAD: There was a diffuse 60 % stenosis.  --  Mid LAD: There was a 100 % stenosis with the distal vessel being filled  via LIMA-LAD.  CX:   --  Circumflex: The vessel was small sized. Angiography showed mild  atherosclerosis withno flow limiting lesions.  --  OM1: Angiography showed mild atherosclerosis with no flow limiting  lesions.  RI:   --  Ostial ramus intermedius: There was a tubular 80 % stenosis.  There was PARUL grade 3 flow through the vessel (brisk flow).  RCA:   --  Proximal RCA: Angiography showed mild atherosclerosis with no  flow limiting lesions.  --  Mid RCA: There was a 100 % stenosis with the distal vessel being filled  via collaterals. This lesion is a chronic total occlusion.  GRAFTS:   --  Graft to the LAD: The graft was a LIMA. Graft angiography  showed minor luminal irregularities. Distal vessel angiography showed  minor luminal irregularities.  AORTA: Ascending aorta: Normal. No additional grafts visualized in  aortogram.  COMPLICATIONS: There were no complications.  DIAGNOSTIC RECOMMENDATIONS: Coronary angiogram demonstrates patent  LIMA-LAD, closed SVG grafts, and a severe stenosis in the ostial Ramus.  Will perform staged PCI to RI when Cr stable and renally optimized.  Prepared and signed by  Corie Ga M.D.  Signed 10/17/2017 13:49:15    < end of copied text >    < from: Cardiac Cath Lab - Adult (11.17.17 @ 11:48) >  PROCEDURE:  --  Intervention on ramus intermedius: drug-eluting stent.    VENTRICLES: No LV gram was performed; however, a recent echocardiogram  demonstrated normal global and regional LV function.  CORONARY VESSELS: The coronary circulation is right dominant.  LM:   --  LM: Angiography showed minor luminal irregularities with no flow  limiting lesions.  LAD:   --  Ostial LAD: There was a 100 % stenosis.  CX:   --  Circumflex: This vessel was not injected, but was visualized  during a prior cardiac catheterization.  RI:   --  Ramus intermedius: There was a 95 % stenosis.  RCA:   --  RCA: This vessel was not injected.  COMPLICATIONS: There were no complications.  DIAGNOSTIC RECOMMENDATIONS: The patient should continue with the present  medications. will add plavix 75mg po once a day/ will add ECASA 325mg po  once day.  Prepared and signed by  Roberta Quick M.D.  Signed 11/17/2017 13:16:01    < end of copied text >    < from: TTE with Doppler (w/Cont) (11.25.17 @ 12:33) >  CONCLUSIONS:  1. Mitral annular calcification, otherwise normal mitral  valve. Mild mitral regurgitation.  2. Normal left ventricular internal dimensions and wall  thicknesses.  3. Endocardium not well visualized; grossly moderate to  severe segmental left ventricularsystolic dysfunction.  Hypokinesis of the inferior, lateral, and inferolateral  walls.  Endocardial visualization enhanced with intravenous  injection of echo contrast (Definity).  No LV thrombus  seen.  4. The right ventricle is not well visualized;grossly  normal right ventricular systolic function.  ------------------------------------------------------------------------  Confirmed on  11/25/2017 - 14:44:41 by Jose Lucia M.D.    < end of copied text >    CT CHEST   < from: CT Chest No Cont (12.01.17 @ 17:38) >    IMPRESSION:   No acute pulmonary disease.    < end of copied text >      ASSESSMENT/PLAN: 	71y Female w/ PMH HTN, CKD, Hyperlipidemia, DM-II, CAD s/p 3V CABG  (LIMA to LAD, SVG to OM, SVG to PDA) at [facility] 2014, s/P CORNELIA TO RI 11/17/17 presenting with shortness of breath, cough, Hypoxic in ED with O2 sats in the 70s, fevers, Leukocytosis and elevated lactate.  CXR showed pulmonary edema in ED. EKG with new intraventricular block and STD V3,V4,V5, CE elevated c/w NSTEMI    --  CT Chest 12/1 no acute pulmonary disease   --Pulm edema/acute decomp systolic HF, now compensated, cont PO Lasix (per Renal)  --CAD s/p CORNELIA to RI 11/17/17. Cont ASA, Plavix.   --NSTEMI and TTE with new moderate to severe LV dysfxn --cont to trend CE  --plan for [facility] Monday 12/4/17  -- Renal noted - Mucomyst ordered to start Rigo 12/3 - will d/w attending re: NaHCO3 gtt given severe LV dysfxn [EF 36%]

## 2017-12-03 LAB
BUN SERPL-MCNC: 69 MG/DL — HIGH (ref 7–23)
CALCIUM SERPL-MCNC: 9.6 MG/DL — SIGNIFICANT CHANGE UP (ref 8.4–10.5)
CHLORIDE SERPL-SCNC: 99 MMOL/L — SIGNIFICANT CHANGE UP (ref 98–107)
CO2 SERPL-SCNC: 17 MMOL/L — LOW (ref 22–31)
CREAT SERPL-MCNC: 2.25 MG/DL — HIGH (ref 0.5–1.3)
GLUCOSE BLDC GLUCOMTR-MCNC: 281 MG/DL — HIGH (ref 70–99)
GLUCOSE BLDC GLUCOMTR-MCNC: 319 MG/DL — HIGH (ref 70–99)
GLUCOSE BLDC GLUCOMTR-MCNC: 392 MG/DL — HIGH (ref 70–99)
GLUCOSE BLDC GLUCOMTR-MCNC: 99 MG/DL — SIGNIFICANT CHANGE UP (ref 70–99)
GLUCOSE SERPL-MCNC: 287 MG/DL — HIGH (ref 70–99)
HCT VFR BLD CALC: 35.1 % — SIGNIFICANT CHANGE UP (ref 34.5–45)
HGB BLD-MCNC: 11.2 G/DL — LOW (ref 11.5–15.5)
MAGNESIUM SERPL-MCNC: 2.1 MG/DL — SIGNIFICANT CHANGE UP (ref 1.6–2.6)
MCHC RBC-ENTMCNC: 28.4 PG — SIGNIFICANT CHANGE UP (ref 27–34)
MCHC RBC-ENTMCNC: 31.9 % — LOW (ref 32–36)
MCV RBC AUTO: 88.9 FL — SIGNIFICANT CHANGE UP (ref 80–100)
NRBC # FLD: 0 — SIGNIFICANT CHANGE UP
PLATELET # BLD AUTO: 364 K/UL — SIGNIFICANT CHANGE UP (ref 150–400)
PMV BLD: 9.2 FL — SIGNIFICANT CHANGE UP (ref 7–13)
POTASSIUM SERPL-MCNC: 3.6 MMOL/L — SIGNIFICANT CHANGE UP (ref 3.5–5.3)
POTASSIUM SERPL-SCNC: 3.6 MMOL/L — SIGNIFICANT CHANGE UP (ref 3.5–5.3)
RBC # BLD: 3.95 M/UL — SIGNIFICANT CHANGE UP (ref 3.8–5.2)
RBC # FLD: 13.6 % — SIGNIFICANT CHANGE UP (ref 10.3–14.5)
SODIUM SERPL-SCNC: 136 MMOL/L — SIGNIFICANT CHANGE UP (ref 135–145)
WBC # BLD: 10.3 K/UL — SIGNIFICANT CHANGE UP (ref 3.8–10.5)
WBC # FLD AUTO: 10.3 K/UL — SIGNIFICANT CHANGE UP (ref 3.8–10.5)

## 2017-12-03 RX ORDER — INSULIN LISPRO 100/ML
24 VIAL (ML) SUBCUTANEOUS
Qty: 0 | Refills: 0 | Status: DISCONTINUED | OUTPATIENT
Start: 2017-12-03 | End: 2017-12-11

## 2017-12-03 RX ORDER — INSULIN GLARGINE 100 [IU]/ML
65 INJECTION, SOLUTION SUBCUTANEOUS AT BEDTIME
Qty: 0 | Refills: 0 | Status: DISCONTINUED | OUTPATIENT
Start: 2017-12-03 | End: 2017-12-11

## 2017-12-03 RX ADMIN — Medication 100 MILLIGRAM(S): at 11:54

## 2017-12-03 RX ADMIN — PANTOPRAZOLE SODIUM 40 MILLIGRAM(S): 20 TABLET, DELAYED RELEASE ORAL at 05:24

## 2017-12-03 RX ADMIN — Medication 40 MILLIGRAM(S): at 05:24

## 2017-12-03 RX ADMIN — Medication 1 GRAM(S): at 05:23

## 2017-12-03 RX ADMIN — Medication 6: at 09:10

## 2017-12-03 RX ADMIN — MONTELUKAST 10 MILLIGRAM(S): 4 TABLET, CHEWABLE ORAL at 21:26

## 2017-12-03 RX ADMIN — Medication 8: at 12:59

## 2017-12-03 RX ADMIN — Medication 50 MICROGRAM(S): at 05:24

## 2017-12-03 RX ADMIN — Medication 1200 MILLIGRAM(S): at 17:13

## 2017-12-03 RX ADMIN — INSULIN GLARGINE 65 UNIT(S): 100 INJECTION, SOLUTION SUBCUTANEOUS at 22:32

## 2017-12-03 RX ADMIN — Medication 1 GRAM(S): at 00:32

## 2017-12-03 RX ADMIN — Medication 100 MILLIGRAM(S): at 21:26

## 2017-12-03 RX ADMIN — CLOPIDOGREL BISULFATE 75 MILLIGRAM(S): 75 TABLET, FILM COATED ORAL at 11:54

## 2017-12-03 RX ADMIN — Medication 1 GRAM(S): at 17:13

## 2017-12-03 RX ADMIN — Medication 100 MILLIGRAM(S): at 05:23

## 2017-12-03 RX ADMIN — Medication 1 GRAM(S): at 11:54

## 2017-12-03 RX ADMIN — Medication 20 UNIT(S): at 09:10

## 2017-12-03 RX ADMIN — PANTOPRAZOLE SODIUM 40 MILLIGRAM(S): 20 TABLET, DELAYED RELEASE ORAL at 17:13

## 2017-12-03 RX ADMIN — Medication 12.5 MILLIGRAM(S): at 05:24

## 2017-12-03 RX ADMIN — Medication 1200 MILLIGRAM(S): at 05:54

## 2017-12-03 RX ADMIN — ATORVASTATIN CALCIUM 40 MILLIGRAM(S): 80 TABLET, FILM COATED ORAL at 21:26

## 2017-12-03 RX ADMIN — Medication 20 UNIT(S): at 13:00

## 2017-12-03 RX ADMIN — Medication 81 MILLIGRAM(S): at 11:53

## 2017-12-03 RX ADMIN — Medication 6: at 22:32

## 2017-12-03 RX ADMIN — SENNA PLUS 2 TABLET(S): 8.6 TABLET ORAL at 21:26

## 2017-12-03 RX ADMIN — Medication 1 GRAM(S): at 23:50

## 2017-12-03 NOTE — PROGRESS NOTE ADULT - ASSESSMENT
Assessment  DMT2: 71y Female with DM T2 with hyperglycemia on insulin, blood sugarsimproving. no hypoglycemia,  eating meals,  non compliant with low carb diet.  Hypothyroidism:  On synthroid 75 mcg po daily.  HTN: Controlled, On med.  CAD: On medications, monitored.

## 2017-12-03 NOTE — PROGRESS NOTE ADULT - SUBJECTIVE AND OBJECTIVE BOX
Chief complaint  Patient is a 71y old  Female who presents with a chief complaint of sob,chest pain (27 Nov 2017 11:05)   Review of systems  Patient in bed, looks comfortable, no fever,  no hypoglycemia.    Labs and Fingersticks    CAPILLARY BLOOD GLUCOSE        Calcium, Total Serum: 9.6 (12-03 @ 06:17)  Calcium, Total Serum: 9.4 (12-02 @ 06:30)          12-03    136  |  99  |  69<H>  ----------------------------<  287<H>  3.6   |  17<L>  |  2.25<H>    Ca    9.6      03 Dec 2017 06:17  Mg     2.1     12-03                          11.2   10.30 )-----------( 364      ( 03 Dec 2017 06:19 )             35.1     Medications  MEDICATIONS  (STANDING):  acetylcysteine  Oral Solution 1200 milliGRAM(s) Oral two times a day  aspirin enteric coated 81 milliGRAM(s) Oral daily  atorvastatin 40 milliGRAM(s) Oral at bedtime  clopidogrel Tablet 75 milliGRAM(s) Oral daily  dextrose 5%. 1000 milliLiter(s) (50 mL/Hr) IV Continuous <Continuous>  dextrose 50% Injectable 12.5 Gram(s) IV Push once  dextrose 50% Injectable 25 Gram(s) IV Push once  dextrose 50% Injectable 25 Gram(s) IV Push once  docusate sodium 100 milliGRAM(s) Oral three times a day  insulin glargine Injectable (LANTUS) 65 Unit(s) SubCutaneous at bedtime  insulin lispro (HumaLOG) corrective regimen sliding scale   SubCutaneous three times a day before meals  insulin lispro (HumaLOG) corrective regimen sliding scale   SubCutaneous at bedtime  insulin lispro Injectable (HumaLOG) 24 Unit(s) SubCutaneous three times a day before meals  levothyroxine 50 MICROGram(s) Oral daily  metoprolol succinate ER 12.5 milliGRAM(s) Oral daily  montelukast 10 milliGRAM(s) Oral at bedtime  pantoprazole    Tablet 40 milliGRAM(s) Oral two times a day before meals  polyethylene glycol 3350 17 Gram(s) Oral daily  senna 2 Tablet(s) Oral at bedtime  sucralfate suspension 1 Gram(s) Oral four times a day      Physical Exam  General: Patient comfortable in bed  Vital Signs Last 12 Hrs  T(F): 97.7 (12-03-17 @ 20:58), Max: 97.7 (12-03-17 @ 14:44)  HR: 87 (12-03-17 @ 20:58) (80 - 87)  BP: 104/52 (12-03-17 @ 20:58) (104/52 - 116/65)  BP(mean): --  RR: 18 (12-03-17 @ 20:58) (18 - 18)  SpO2: 99% (12-03-17 @ 20:58) (98% - 99%)  Neck: No palpable thyroid nodules.  CVS: S1S2, No murmurs  Respiratory: No wheezing, no crepitations  GI: Abdomen soft, bowel sounds positive  Musculoskeletal: Positive edema lower extremities.   Skin: No skin rashes, no ecchymosis    Diagnostics

## 2017-12-03 NOTE — PROGRESS NOTE ADULT - SUBJECTIVE AND OBJECTIVE BOX
INTERVAL HPI/OVERNIGHT EVENTS:    denies n/v/d/c, abdominal pain, melena or brbpr     MEDICATIONS  (STANDING):  acetylcysteine  Oral Solution 1200 milliGRAM(s) Oral two times a day  aspirin enteric coated 81 milliGRAM(s) Oral daily  atorvastatin 40 milliGRAM(s) Oral at bedtime  clopidogrel Tablet 75 milliGRAM(s) Oral daily  dextrose 5%. 1000 milliLiter(s) (50 mL/Hr) IV Continuous <Continuous>  dextrose 50% Injectable 12.5 Gram(s) IV Push once  dextrose 50% Injectable 25 Gram(s) IV Push once  dextrose 50% Injectable 25 Gram(s) IV Push once  docusate sodium 100 milliGRAM(s) Oral three times a day  furosemide    Tablet 40 milliGRAM(s) Oral daily  insulin glargine Injectable (LANTUS) 55 Unit(s) SubCutaneous at bedtime  insulin lispro (HumaLOG) corrective regimen sliding scale   SubCutaneous three times a day before meals  insulin lispro (HumaLOG) corrective regimen sliding scale   SubCutaneous at bedtime  insulin lispro Injectable (HumaLOG) 20 Unit(s) SubCutaneous three times a day before meals  levothyroxine 50 MICROGram(s) Oral daily  metoprolol succinate ER 12.5 milliGRAM(s) Oral daily  montelukast 10 milliGRAM(s) Oral at bedtime  pantoprazole    Tablet 40 milliGRAM(s) Oral two times a day before meals  polyethylene glycol 3350 17 Gram(s) Oral daily  senna 2 Tablet(s) Oral at bedtime  sucralfate suspension 1 Gram(s) Oral four times a day    MEDICATIONS  (PRN):  dextrose Gel 1 Dose(s) Oral once PRN Blood Glucose LESS THAN 70 milliGRAM(s)/deciliter  glucagon  Injectable 1 milliGRAM(s) IntraMuscular once PRN Glucose LESS THAN 70 milligrams/deciliter  glucagon  Injectable 1 milliGRAM(s) IntraMuscular once PRN Glucose LESS THAN 70 milligrams/deciliter      Allergies    No Known Allergies    Intolerances        Review of Systems:    General:  No wt loss, fevers, chills, night sweats, fatigue   Eyes:  Good vision, no reported pain  ENT:  No sore throat, pain, runny nose, dysphagia  CV:  No pain, palpitations, hypo/hypertension  Resp:  No dyspnea, cough, tachypnea, wheezing  GI:  No pain, No nausea, No vomiting, No diarrhea, No constipation, No weight loss, No fever, No pruritis, No rectal bleeding, No melena, No dysphagia  :  No pain, bleeding, incontinence, nocturia  Muscle:  No pain, weakness  Neuro:  No weakness, tingling, memory problems  Psych:  No fatigue, insomnia, mood problems, depression  Endocrine:  No polyuria, polydypsia, cold/heat intolerance  Heme:  No petechiae, ecchymosis, easy bruisability  Skin:  No rash, tattoos, scars, edema      Vital Signs Last 24 Hrs  T(C): 36.7 (03 Dec 2017 05:23), Max: 36.7 (03 Dec 2017 05:23)  T(F): 98 (03 Dec 2017 05:23), Max: 98 (03 Dec 2017 05:23)  HR: 77 (03 Dec 2017 05:23) (75 - 80)  BP: 116/60 (03 Dec 2017 05:23) (103/64 - 117/66)  BP(mean): 78 (02 Dec 2017 20:18) (78 - 78)  RR: 18 (03 Dec 2017 05:23) (16 - 18)  SpO2: 99% (03 Dec 2017 05:23) (99% - 100%)    PHYSICAL EXAM:    Constitutional: NAD  HEENT: EOMI, throat clear  Neck: No LAD, supple  Respiratory: CTA and P  Cardiovascular: S1 and S2, RRR, no M  Gastrointestinal: BS+, soft, NT/ND, neg HSM,  Extremities: No peripheral edema, neg clubbing, cyanosis  Vascular: 2+ peripheral pulses  Neurological: A/O x 3, no focal deficits  Psychiatric: Normal mood, normal affect  Skin: No rashes      LABS:                        11.2   10.30 )-----------( 364      ( 03 Dec 2017 06:19 )             35.1     12-03    136  |  99  |  69<H>  ----------------------------<  287<H>  3.6   |  17<L>  |  2.25<H>    Ca    9.6      03 Dec 2017 06:17  Mg     2.1     12-03            RADIOLOGY & ADDITIONAL TESTS:

## 2017-12-03 NOTE — PROGRESS NOTE ADULT - ASSESSMENT
1.	MERVIN, worsening pre-Renal pattern.  2.	CKD.  3.	CHF decompensation.  4.	CAD.  5.	Metabolic Acidosis, mixed AG/NonAG of unclear etiology, likely related to Renal dysfunction.    PLAN:  1.	Reduce lasix or hold if Cardiology agrees.  2.	Repeat Urine Lytes and UA.  3.	Follow up BMP.    Edwin Garcia MD  [phone]

## 2017-12-03 NOTE — PROGRESS NOTE ADULT - SUBJECTIVE AND OBJECTIVE BOX
SELVIN CLAIRE  HPI:  This is a patient who was admitted for chest and dyspnea, much improved. She feels better today.  She is followed for MERVIN on CKD. She is currently on Lasix 40 daily.  HEALTH ISSUES - PROBLEM Dx:  Constipation, slow transit: Constipation, slow transit  Type 2 diabetes mellitus with other neurologic complication, without long-term current use of insulin: Type 2 diabetes mellitus with other neurologic complication, without long-term current use of insulin  Gastroesophageal reflux disease without esophagitis: Gastroesophageal reflux disease without esophagitis  Systolic heart failure, unspecified heart failure chronicity: Systolic heart failure, unspecified heart failure chronicity  Need for prophylactic measure: Need for prophylactic measure  Stage 3 chronic kidney disease: Stage 3 chronic kidney disease  Acute systolic heart failure: Acute systolic heart failure  Type 2 diabetes mellitus with hyperglycemia, unspecified long term insulin use status: Type 2 diabetes mellitus with hyperglycemia, unspecified long term insulin use status  Diabetes mellitus, type 2: Diabetes mellitus, type 2  Hyperlipidemia: Hyperlipidemia  Hypothyroidism: Hypothyroidism  CAD (coronary artery disease): CAD (coronary artery disease)  Pneumonia: Pneumonia  NSTEMI (non-ST elevated myocardial infarction): NSTEMI (non-ST elevated myocardial infarction)  Pulmonary edema cardiac cause: Pulmonary edema cardiac cause        MEDICATIONS  (STANDING):  acetylcysteine  Oral Solution 1200 milliGRAM(s) Oral two times a day  aspirin enteric coated 81 milliGRAM(s) Oral daily  atorvastatin 40 milliGRAM(s) Oral at bedtime  clopidogrel Tablet 75 milliGRAM(s) Oral daily  dextrose 5%. 1000 milliLiter(s) (50 mL/Hr) IV Continuous <Continuous>  dextrose 50% Injectable 12.5 Gram(s) IV Push once  dextrose 50% Injectable 25 Gram(s) IV Push once  dextrose 50% Injectable 25 Gram(s) IV Push once  docusate sodium 100 milliGRAM(s) Oral three times a day  furosemide    Tablet 40 milliGRAM(s) Oral daily  insulin glargine Injectable (LANTUS) 55 Unit(s) SubCutaneous at bedtime  insulin lispro (HumaLOG) corrective regimen sliding scale   SubCutaneous three times a day before meals  insulin lispro (HumaLOG) corrective regimen sliding scale   SubCutaneous at bedtime  insulin lispro Injectable (HumaLOG) 20 Unit(s) SubCutaneous three times a day before meals  levothyroxine 50 MICROGram(s) Oral daily  metoprolol succinate ER 12.5 milliGRAM(s) Oral daily  montelukast 10 milliGRAM(s) Oral at bedtime  pantoprazole    Tablet 40 milliGRAM(s) Oral two times a day before meals  polyethylene glycol 3350 17 Gram(s) Oral daily  senna 2 Tablet(s) Oral at bedtime  sucralfate suspension 1 Gram(s) Oral four times a day    MEDICATIONS  (PRN):  dextrose Gel 1 Dose(s) Oral once PRN Blood Glucose LESS THAN 70 milliGRAM(s)/deciliter  glucagon  Injectable 1 milliGRAM(s) IntraMuscular once PRN Glucose LESS THAN 70 milligrams/deciliter  glucagon  Injectable 1 milliGRAM(s) IntraMuscular once PRN Glucose LESS THAN 70 milligrams/deciliter    Vital Signs Last 24 Hrs  T(C): 36.7 (03 Dec 2017 05:23), Max: 36.7 (03 Dec 2017 05:23)  T(F): 98 (03 Dec 2017 05:23), Max: 98 (03 Dec 2017 05:23)  HR: 77 (03 Dec 2017 05:23) (75 - 77)  BP: 116/60 (03 Dec 2017 05:23) (105/60 - 117/66)  BP(mean): 78 (02 Dec 2017 20:18) (78 - 78)  RR: 18 (03 Dec 2017 05:23) (16 - 18)  SpO2: 99% (03 Dec 2017 05:23) (99% - 100%)  Daily     Daily     PHYSICAL EXAM:  Constitutional:  She appears comfortable and not distressed. Not diaphoretic.    Neck:  The thyroid is normal. Trachea is midline.     Respiratory: The lungs are clear to auscultation. No dullness and expansion is normal.    Cardiovascular: S1 and S2 are normal. No mummurs, rubs or gallops are present.    Gastrointestinal: The abdomen is soft. No tenderness is present. No masses are present. Bowel sounds are normal.    Genitourinary: The bladder is not distended. No CVA tenderness is present.    Extremities: No edema is noted. No deformities are present.    Neurological: Cognition is normal. Tone, power and sensation are normal.     Skin: No lesions are seen  or palpated.    Lymph Nodes: No lymphadenopathy is present.    Psychiatric: Mood is appropriate. No hallucinations or flight of ideas are noted.      LABS:                        11.2   10.30 )-----------( 364      ( 03 Dec 2017 06:19 )             35.1     12-03    136  |  99  |  69<H>  ----------------------------<  287<H>  3.6   |  17<L>  |  2.25<H>    Ca    9.6      03 Dec 2017 06:17  Mg     2.1     12-03    < from: CT Chest No Cont (12.01.17 @ 17:38) >    CHEST:     LUNGS AND LARGE AIRWAYS: Patent central airways. A 7 mm left lower lobe   pulmonary nodule along the major fissure is unchanged and most compatible   with an intrapulmonary lymph jai (series 2, image 39). A 3 mm right   lower lobe pulmonary nodule (series 2, image 44) is unchanged.  PLEURA: No pleural effusion.  VESSELS: Within normal limits.  HEART: Heart size is normal.No pericardial effusion. Status post CABG.   Coronary stents. Calcified atheromatous disease of the coronary arteries.   MEDIASTINUM AND ADIA: No lymphadenopathy.  CHEST WALL AND LOWER NECK: Within normal limits.  VISUALIZED UPPER ABDOMEN: Partially imaged left upper pole renalcyst.  BONES: Status post median sternotomy.    IMPRESSION:   No acute pulmonary disease.      < end of copied text >

## 2017-12-03 NOTE — PROGRESS NOTE ADULT - SUBJECTIVE AND OBJECTIVE BOX
Patient denies  anginal chest pain or shortness of breath    MEDICATIONS  (STANDING):  acetylcysteine  Oral Solution 1200 milliGRAM(s) Oral two times a day  aspirin enteric coated 81 milliGRAM(s) Oral daily  atorvastatin 40 milliGRAM(s) Oral at bedtime  clopidogrel Tablet 75 milliGRAM(s) Oral daily  dextrose 5%. 1000 milliLiter(s) (50 mL/Hr) IV Continuous <Continuous>  dextrose 50% Injectable 12.5 Gram(s) IV Push once  dextrose 50% Injectable 25 Gram(s) IV Push once  dextrose 50% Injectable 25 Gram(s) IV Push once  docusate sodium 100 milliGRAM(s) Oral three times a day  furosemide    Tablet 40 milliGRAM(s) Oral daily  insulin glargine Injectable (LANTUS) 55 Unit(s) SubCutaneous at bedtime  insulin lispro (HumaLOG) corrective regimen sliding scale   SubCutaneous three times a day before meals  insulin lispro (HumaLOG) corrective regimen sliding scale   SubCutaneous at bedtime  insulin lispro Injectable (HumaLOG) 20 Unit(s) SubCutaneous three times a day before meals  levothyroxine 50 MICROGram(s) Oral daily  metoprolol succinate ER 12.5 milliGRAM(s) Oral daily  montelukast 10 milliGRAM(s) Oral at bedtime  pantoprazole    Tablet 40 milliGRAM(s) Oral two times a day before meals  polyethylene glycol 3350 17 Gram(s) Oral daily  senna 2 Tablet(s) Oral at bedtime  sucralfate suspension 1 Gram(s) Oral four times a day    MEDICATIONS  (PRN):  dextrose Gel 1 Dose(s) Oral once PRN Blood Glucose LESS THAN 70 milliGRAM(s)/deciliter  glucagon  Injectable 1 milliGRAM(s) IntraMuscular once PRN Glucose LESS THAN 70 milligrams/deciliter  glucagon  Injectable 1 milliGRAM(s) IntraMuscular once PRN Glucose LESS THAN 70 milligrams/deciliter      LABS:                        11.2   10.30 )-----------( 364      ( 03 Dec 2017 06:19 )             35.1     Hemoglobin: 11.2 g/dL (12-03 @ 06:19)  Hemoglobin: 10.5 g/dL (12-02 @ 06:30)  Hemoglobin: 10.2 g/dL (12-01 @ 05:37)  Hemoglobin: 10.2 g/dL (12-01 @ 05:37)  Hemoglobin: 10.6 g/dL (11-30 @ 07:05)    12-03    136  |  99  |  69<H>  ----------------------------<  287<H>  3.6   |  17<L>  |  2.25<H>    Ca    9.6      03 Dec 2017 06:17  Mg     2.1     12-03      Creatinine Trend: 2.25<--, 2.01<--, 2.00<--, 2.25<--, 2.06<--, 2.33<--       PHYSICAL EXAM  Vital Signs Last 24 Hrs  T(C): 36.7 (03 Dec 2017 05:23), Max: 36.7 (03 Dec 2017 05:23)  T(F): 98 (03 Dec 2017 05:23), Max: 98 (03 Dec 2017 05:23)  HR: 77 (03 Dec 2017 05:23) (75 - 77)  BP: 116/60 (03 Dec 2017 05:23) (116/60 - 117/66)  BP(mean): 78 (02 Dec 2017 20:18) (78 - 78)  RR: 18 (03 Dec 2017 05:23) (16 - 18)  SpO2: 99% (03 Dec 2017 05:23) (99% - 100%)    Cardiovascular: Normal S1 S2,     No JVD, 1/6 YUSUF murmur, Peripheral pulses palpable 2+ bilaterally  Respiratory: Bibasilar crackles	  Gastrointestinal:  Soft, Non-tender, + BS	  Extremities no edema, cyanosis, clubbing B/L LE's     DIAGNOSTIC TESTING:    < from: Cardiac Cath Lab - Adult (10.13.17 @ 10:40) >  VENTRICLES: No left ventriculogram was performed.  CORONARY VESSELS: The coronary circulation is right dominant.  LM:   --  LM: Normal.  LAD:   --  Proximal LAD: There was a diffuse 60 % stenosis.  --  Mid LAD: There was a 100 % stenosis with the distal vessel being filled  via LIMA-LAD.  CX:   --  Circumflex: The vessel was small sized. Angiography showed mild  atherosclerosis withno flow limiting lesions.  --  OM1: Angiography showed mild atherosclerosis with no flow limiting  lesions.  RI:   --  Ostial ramus intermedius: There was a tubular 80 % stenosis.  There was PARUL grade 3 flow through the vessel (brisk flow).  RCA:   --  Proximal RCA: Angiography showed mild atherosclerosis with no  flow limiting lesions.  --  Mid RCA: There was a 100 % stenosis with the distal vessel being filled  via collaterals. This lesion is a chronic total occlusion.  GRAFTS:   --  Graft to the LAD: The graft was a LIMA. Graft angiography  showed minor luminal irregularities. Distal vessel angiography showed  minor luminal irregularities.  AORTA: Ascending aorta: Normal. No additional grafts visualized in  aortogram.  COMPLICATIONS: There were no complications.  DIAGNOSTIC RECOMMENDATIONS: Coronary angiogram demonstrates patent  LIMA-LAD, closed SVG grafts, and a severe stenosis in the ostial Ramus.  Will perform staged PCI to RI when Cr stable and renally optimized.  Prepared and signed by  Corie Ga M.D.  Signed 10/17/2017 13:49:15    < end of copied text >    < from: Cardiac Cath Lab - Adult (11.17.17 @ 11:48) >  PROCEDURE:  --  Intervention on ramus intermedius: drug-eluting stent.    VENTRICLES: No LV gram was performed; however, a recent echocardiogram  demonstrated normal global and regional LV function.  CORONARY VESSELS: The coronary circulation is right dominant.  LM:   --  LM: Angiography showed minor luminal irregularities with no flow  limiting lesions.  LAD:   --  Ostial LAD: There was a 100 % stenosis.  CX:   --  Circumflex: This vessel was not injected, but was visualized  during a prior cardiac catheterization.  RI:   --  Ramus intermedius: There was a 95 % stenosis.  RCA:   --  RCA: This vessel was not injected.  COMPLICATIONS: There were no complications.  DIAGNOSTIC RECOMMENDATIONS: The patient should continue with the present  medications. will add plavix 75mg po once a day/ will add ECASA 325mg po  once day.  Prepared and signed by  Roberta Quick M.D.  Signed 11/17/2017 13:16:01    < end of copied text >    < from: TTE with Doppler (w/Cont) (11.25.17 @ 12:33) >  CONCLUSIONS:  1. Mitral annular calcification, otherwise normal mitral  valve. Mild mitral regurgitation.  2. Normal left ventricular internal dimensions and wall  thicknesses.  3. Endocardium not well visualized; grossly moderate to  severe segmental left ventricularsystolic dysfunction.  Hypokinesis of the inferior, lateral, and inferolateral  walls.  Endocardial visualization enhanced with intravenous  injection of echo contrast (Definity).  No LV thrombus  seen.  4. The right ventricle is not well visualized;grossly  normal right ventricular systolic function.  ------------------------------------------------------------------------  Confirmed on  11/25/2017 - 14:44:41 by Jose Lucia M.D.    < end of copied text >    CT CHEST   < from: CT Chest No Cont (12.01.17 @ 17:38) >    IMPRESSION:   No acute pulmonary disease.    < end of copied text >      ASSESSMENT/PLAN: 	71y Female w/ PMH HTN, CKD, Hyperlipidemia, DM-II, CAD s/p 3V CABG  (LIMA to LAD, SVG to OM, SVG to PDA) at [facility] 2014, s/P CORNELIA TO [facility] 11/17/17 presenting with shortness of breath, cough, Hypoxic in ED with O2 sats in the 70s, fevers, Leukocytosis and elevated lactate.  CXR showed pulmonary edema in ED. EKG with new intraventricular block and STD V3,V4,V5, CE elevated c/w NSTEMI    --  CT Chest 12/1 no acute pulmonary disease   --Pulm edema/acute decomp systolic HF, now compensated, cont PO Lasix (per Renal)  --CAD s/p CORNELIA to [facility] 11/17/17. Cont ASA, Plavix.   --NSTEMI and TTE with new moderate to severe LV dysfxn --cont to trend CE  --plan for [facility] Monday 12/4/17  -- Renal recommendations appreciated Patient denies  anginal chest pain or shortness of breath    MEDICATIONS  (STANDING):  acetylcysteine  Oral Solution 1200 milliGRAM(s) Oral two times a day  aspirin enteric coated 81 milliGRAM(s) Oral daily  atorvastatin 40 milliGRAM(s) Oral at bedtime  clopidogrel Tablet 75 milliGRAM(s) Oral daily  dextrose 5%. 1000 milliLiter(s) (50 mL/Hr) IV Continuous <Continuous>  dextrose 50% Injectable 12.5 Gram(s) IV Push once  dextrose 50% Injectable 25 Gram(s) IV Push once  dextrose 50% Injectable 25 Gram(s) IV Push once  docusate sodium 100 milliGRAM(s) Oral three times a day  furosemide    Tablet 40 milliGRAM(s) Oral daily  insulin glargine Injectable (LANTUS) 55 Unit(s) SubCutaneous at bedtime  insulin lispro (HumaLOG) corrective regimen sliding scale   SubCutaneous three times a day before meals  insulin lispro (HumaLOG) corrective regimen sliding scale   SubCutaneous at bedtime  insulin lispro Injectable (HumaLOG) 20 Unit(s) SubCutaneous three times a day before meals  levothyroxine 50 MICROGram(s) Oral daily  metoprolol succinate ER 12.5 milliGRAM(s) Oral daily  montelukast 10 milliGRAM(s) Oral at bedtime  pantoprazole    Tablet 40 milliGRAM(s) Oral two times a day before meals  polyethylene glycol 3350 17 Gram(s) Oral daily  senna 2 Tablet(s) Oral at bedtime  sucralfate suspension 1 Gram(s) Oral four times a day    MEDICATIONS  (PRN):  dextrose Gel 1 Dose(s) Oral once PRN Blood Glucose LESS THAN 70 milliGRAM(s)/deciliter  glucagon  Injectable 1 milliGRAM(s) IntraMuscular once PRN Glucose LESS THAN 70 milligrams/deciliter  glucagon  Injectable 1 milliGRAM(s) IntraMuscular once PRN Glucose LESS THAN 70 milligrams/deciliter      LABS:                        11.2   10.30 )-----------( 364      ( 03 Dec 2017 06:19 )             35.1     Hemoglobin: 11.2 g/dL (12-03 @ 06:19)  Hemoglobin: 10.5 g/dL (12-02 @ 06:30)  Hemoglobin: 10.2 g/dL (12-01 @ 05:37)  Hemoglobin: 10.2 g/dL (12-01 @ 05:37)  Hemoglobin: 10.6 g/dL (11-30 @ 07:05)    12-03    136  |  99  |  69<H>  ----------------------------<  287<H>  3.6   |  17<L>  |  2.25<H>    Ca    9.6      03 Dec 2017 06:17  Mg     2.1     12-03      Creatinine Trend: 2.25<--, 2.01<--, 2.00<--, 2.25<--, 2.06<--, 2.33<--       PHYSICAL EXAM  Vital Signs Last 24 Hrs  T(C): 36.7 (03 Dec 2017 05:23), Max: 36.7 (03 Dec 2017 05:23)  T(F): 98 (03 Dec 2017 05:23), Max: 98 (03 Dec 2017 05:23)  HR: 77 (03 Dec 2017 05:23) (75 - 77)  BP: 116/60 (03 Dec 2017 05:23) (116/60 - 117/66)  BP(mean): 78 (02 Dec 2017 20:18) (78 - 78)  RR: 18 (03 Dec 2017 05:23) (16 - 18)  SpO2: 99% (03 Dec 2017 05:23) (99% - 100%)    Cardiovascular: Normal S1 S2,     No JVD, 1/6 YUSUF murmur, Peripheral pulses palpable 2+ bilaterally  Respiratory: Bibasilar crackles	  Gastrointestinal:  Soft, Non-tender, + BS	  Extremities no edema, cyanosis, clubbing B/L LE's     DIAGNOSTIC TESTING:    < from: Cardiac Cath Lab - Adult (10.13.17 @ 10:40) >  VENTRICLES: No left ventriculogram was performed.  CORONARY VESSELS: The coronary circulation is right dominant.  LM:   --  LM: Normal.  LAD:   --  Proximal LAD: There was a diffuse 60 % stenosis.  --  Mid LAD: There was a 100 % stenosis with the distal vessel being filled  via LIMA-LAD.  CX:   --  Circumflex: The vessel was small sized. Angiography showed mild  atherosclerosis withno flow limiting lesions.  --  OM1: Angiography showed mild atherosclerosis with no flow limiting  lesions.  RI:   --  Ostial ramus intermedius: There was a tubular 80 % stenosis.  There was PARUL grade 3 flow through the vessel (brisk flow).  RCA:   --  Proximal RCA: Angiography showed mild atherosclerosis with no  flow limiting lesions.  --  Mid RCA: There was a 100 % stenosis with the distal vessel being filled  via collaterals. This lesion is a chronic total occlusion.  GRAFTS:   --  Graft to the LAD: The graft was a LIMA. Graft angiography  showed minor luminal irregularities. Distal vessel angiography showed  minor luminal irregularities.  AORTA: Ascending aorta: Normal. No additional grafts visualized in  aortogram.  COMPLICATIONS: There were no complications.  DIAGNOSTIC RECOMMENDATIONS: Coronary angiogram demonstrates patent  LIMA-LAD, closed SVG grafts, and a severe stenosis in the ostial Ramus.  Will perform staged PCI to RI when Cr stable and renally optimized.  Prepared and signed by  Corie Ga M.D.  Signed 10/17/2017 13:49:15    < end of copied text >    < from: Cardiac Cath Lab - Adult (11.17.17 @ 11:48) >  PROCEDURE:  --  Intervention on ramus intermedius: drug-eluting stent.    VENTRICLES: No LV gram was performed; however, a recent echocardiogram  demonstrated normal global and regional LV function.  CORONARY VESSELS: The coronary circulation is right dominant.  LM:   --  LM: Angiography showed minor luminal irregularities with no flow  limiting lesions.  LAD:   --  Ostial LAD: There was a 100 % stenosis.  CX:   --  Circumflex: This vessel was not injected, but was visualized  during a prior cardiac catheterization.  RI:   --  Ramus intermedius: There was a 95 % stenosis.  RCA:   --  RCA: This vessel was not injected.  COMPLICATIONS: There were no complications.  DIAGNOSTIC RECOMMENDATIONS: The patient should continue with the present  medications. will add plavix 75mg po once a day/ will add ECASA 325mg po  once day.  Prepared and signed by  Roberta Quick M.D.  Signed 11/17/2017 13:16:01    < end of copied text >    < from: TTE with Doppler (w/Cont) (11.25.17 @ 12:33) >  CONCLUSIONS:  1. Mitral annular calcification, otherwise normal mitral  valve. Mild mitral regurgitation.  2. Normal left ventricular internal dimensions and wall  thicknesses.  3. Endocardium not well visualized; grossly moderate to  severe segmental left ventricularsystolic dysfunction.  Hypokinesis of the inferior, lateral, and inferolateral  walls.  Endocardial visualization enhanced with intravenous  injection of echo contrast (Definity).  No LV thrombus  seen.  4. The right ventricle is not well visualized;grossly  normal right ventricular systolic function.  ------------------------------------------------------------------------  Confirmed on  11/25/2017 - 14:44:41 by Jose Lucia M.D.    < end of copied text >    CT CHEST   < from: CT Chest No Cont (12.01.17 @ 17:38) >    IMPRESSION:   No acute pulmonary disease.    < end of copied text >      ASSESSMENT/PLAN: 	71y Female w/ PMH HTN, CKD, Hyperlipidemia, DM-II, CAD s/p 3V CABG  (LIMA to LAD, SVG to OM, SVG to PDA) at [facility] 2014, s/P CORNELIA TO [facility] 11/17/17 presenting with shortness of breath, cough, Hypoxic in ED with O2 sats in the 70s, fevers, Leukocytosis and elevated lactate.  CXR showed pulmonary edema in ED. EKG with new intraventricular block and STD V3,V4,V5, CE elevated c/w NSTEMI    --  CT Chest 12/1 no acute pulmonary disease   --Pulm edema/acute decomp systolic HF, now compensated, cont PO Lasix (per Renal)  --CAD s/p CORNELIA to [facility] 11/17/17. Cont ASA, Plavix.   --NSTEMI and TTE with new moderate to severe LV dysfxn --cont to trend CE  --plan for [facility] this week if OK w/ renal       will hold lasix today and re-eval in AM   -- Renal recommendations appreciated

## 2017-12-04 LAB
BUN SERPL-MCNC: 63 MG/DL — HIGH (ref 7–23)
CALCIUM SERPL-MCNC: 9.4 MG/DL — SIGNIFICANT CHANGE UP (ref 8.4–10.5)
CHLORIDE SERPL-SCNC: 101 MMOL/L — SIGNIFICANT CHANGE UP (ref 98–107)
CO2 SERPL-SCNC: 18 MMOL/L — LOW (ref 22–31)
CREAT SERPL-MCNC: 1.94 MG/DL — HIGH (ref 0.5–1.3)
GLUCOSE BLDC GLUCOMTR-MCNC: 162 MG/DL — HIGH (ref 70–99)
GLUCOSE BLDC GLUCOMTR-MCNC: 164 MG/DL — HIGH (ref 70–99)
GLUCOSE BLDC GLUCOMTR-MCNC: 235 MG/DL — HIGH (ref 70–99)
GLUCOSE BLDC GLUCOMTR-MCNC: 294 MG/DL — HIGH (ref 70–99)
GLUCOSE SERPL-MCNC: 231 MG/DL — HIGH (ref 70–99)
HCT VFR BLD CALC: 31 % — LOW (ref 34.5–45)
HGB BLD-MCNC: 10.5 G/DL — LOW (ref 11.5–15.5)
MAGNESIUM SERPL-MCNC: 2.1 MG/DL — SIGNIFICANT CHANGE UP (ref 1.6–2.6)
MCHC RBC-ENTMCNC: 29.2 PG — SIGNIFICANT CHANGE UP (ref 27–34)
MCHC RBC-ENTMCNC: 33.9 % — SIGNIFICANT CHANGE UP (ref 32–36)
MCV RBC AUTO: 86.4 FL — SIGNIFICANT CHANGE UP (ref 80–100)
NRBC # FLD: 0 — SIGNIFICANT CHANGE UP
PLATELET # BLD AUTO: 320 K/UL — SIGNIFICANT CHANGE UP (ref 150–400)
PMV BLD: 9 FL — SIGNIFICANT CHANGE UP (ref 7–13)
POTASSIUM SERPL-MCNC: 3.6 MMOL/L — SIGNIFICANT CHANGE UP (ref 3.5–5.3)
POTASSIUM SERPL-SCNC: 3.6 MMOL/L — SIGNIFICANT CHANGE UP (ref 3.5–5.3)
RBC # BLD: 3.59 M/UL — LOW (ref 3.8–5.2)
RBC # FLD: 13.3 % — SIGNIFICANT CHANGE UP (ref 10.3–14.5)
SODIUM SERPL-SCNC: 138 MMOL/L — SIGNIFICANT CHANGE UP (ref 135–145)
WBC # BLD: 10.82 K/UL — HIGH (ref 3.8–10.5)
WBC # FLD AUTO: 10.82 K/UL — HIGH (ref 3.8–10.5)

## 2017-12-04 RX ORDER — HUMAN INSULIN 100 [IU]/ML
14 INJECTION, SUSPENSION SUBCUTANEOUS ONCE
Qty: 0 | Refills: 0 | Status: COMPLETED | OUTPATIENT
Start: 2017-12-04 | End: 2017-12-04

## 2017-12-04 RX ORDER — ACETYLCYSTEINE 200 MG/ML
1200 VIAL (ML) MISCELLANEOUS
Qty: 0 | Refills: 0 | Status: COMPLETED | OUTPATIENT
Start: 2017-12-04 | End: 2017-12-06

## 2017-12-04 RX ORDER — SODIUM BICARBONATE 1 MEQ/ML
0.53 SYRINGE (ML) INTRAVENOUS
Qty: 150 | Refills: 0 | Status: DISCONTINUED | OUTPATIENT
Start: 2017-12-04 | End: 2017-12-07

## 2017-12-04 RX ORDER — SODIUM BICARBONATE 1 MEQ/ML
0.2 SYRINGE (ML) INTRAVENOUS
Qty: 150 | Refills: 0 | Status: DISCONTINUED | OUTPATIENT
Start: 2017-12-04 | End: 2017-12-05

## 2017-12-04 RX ADMIN — Medication 100 MILLIGRAM(S): at 21:26

## 2017-12-04 RX ADMIN — HUMAN INSULIN 14 UNIT(S): 100 INJECTION, SUSPENSION SUBCUTANEOUS at 12:29

## 2017-12-04 RX ADMIN — Medication 1 GRAM(S): at 17:43

## 2017-12-04 RX ADMIN — PANTOPRAZOLE SODIUM 40 MILLIGRAM(S): 20 TABLET, DELAYED RELEASE ORAL at 17:43

## 2017-12-04 RX ADMIN — Medication 50 MICROGRAM(S): at 05:37

## 2017-12-04 RX ADMIN — Medication 6: at 17:43

## 2017-12-04 RX ADMIN — PANTOPRAZOLE SODIUM 40 MILLIGRAM(S): 20 TABLET, DELAYED RELEASE ORAL at 05:37

## 2017-12-04 RX ADMIN — ATORVASTATIN CALCIUM 40 MILLIGRAM(S): 80 TABLET, FILM COATED ORAL at 21:25

## 2017-12-04 RX ADMIN — Medication 12.5 MILLIGRAM(S): at 05:37

## 2017-12-04 RX ADMIN — Medication 1 GRAM(S): at 12:29

## 2017-12-04 RX ADMIN — Medication 1200 MILLIGRAM(S): at 17:43

## 2017-12-04 RX ADMIN — MONTELUKAST 10 MILLIGRAM(S): 4 TABLET, CHEWABLE ORAL at 21:25

## 2017-12-04 RX ADMIN — Medication 81 MILLIGRAM(S): at 12:29

## 2017-12-04 RX ADMIN — Medication 1 GRAM(S): at 05:37

## 2017-12-04 RX ADMIN — Medication 4: at 08:36

## 2017-12-04 RX ADMIN — Medication 24 UNIT(S): at 17:43

## 2017-12-04 RX ADMIN — CLOPIDOGREL BISULFATE 75 MILLIGRAM(S): 75 TABLET, FILM COATED ORAL at 12:29

## 2017-12-04 RX ADMIN — Medication 1200 MILLIGRAM(S): at 05:37

## 2017-12-04 RX ADMIN — INSULIN GLARGINE 65 UNIT(S): 100 INJECTION, SOLUTION SUBCUTANEOUS at 22:46

## 2017-12-04 RX ADMIN — SENNA PLUS 2 TABLET(S): 8.6 TABLET ORAL at 21:25

## 2017-12-04 RX ADMIN — POLYETHYLENE GLYCOL 3350 17 GRAM(S): 17 POWDER, FOR SOLUTION ORAL at 12:29

## 2017-12-04 RX ADMIN — Medication 100 MILLIGRAM(S): at 05:36

## 2017-12-04 RX ADMIN — Medication: at 12:41

## 2017-12-04 NOTE — PROGRESS NOTE ADULT - SUBJECTIVE AND OBJECTIVE BOX
Patient denies  anginal chest pain or shortness of breath    MEDICATIONS  (STANDING):  aspirin enteric coated 81 milliGRAM(s) Oral daily  atorvastatin 40 milliGRAM(s) Oral at bedtime  clopidogrel Tablet 75 milliGRAM(s) Oral daily  docusate sodium 100 milliGRAM(s) Oral three times a day  insulin glargine Injectable (LANTUS) 65 Unit(s) SubCutaneous at bedtime  insulin lispro (HumaLOG) corrective regimen sliding scale   SubCutaneous three times a day before meals  insulin lispro (HumaLOG) corrective regimen sliding scale   SubCutaneous at bedtime  insulin lispro Injectable (HumaLOG) 24 Unit(s) SubCutaneous three times a day before meals  levothyroxine 50 MICROGram(s) Oral daily  metoprolol succinate ER 12.5 milliGRAM(s) Oral daily  montelukast 10 milliGRAM(s) Oral at bedtime  pantoprazole    Tablet 40 milliGRAM(s) Oral two times a day before meals  polyethylene glycol 3350 17 Gram(s) Oral daily  senna 2 Tablet(s) Oral at bedtime  sucralfate suspension 1 Gram(s) Oral four times a day    LABS:                        10.5   10.82 )-----------( 320      ( 04 Dec 2017 06:45 )             31.0     138  |  101  |  63<H>  ----------------------------<  231<H>  3.6   |  18<L>  |  1.94<H>    Ca    9.4      04 Dec 2017 06:45  Mg     2.1     12-04    Creatinine Trend: 1.94<--, 2.25<--, 2.01<--, 2.00<--, 2.25<--, 2.06<--     PHYSICAL EXAM  Vital Signs Last 24 Hrs  T(C): 36.3 (04 Dec 2017 14:00), Max: 36.7 (04 Dec 2017 05:01)  T(F): 97.4 (04 Dec 2017 14:00), Max: 98 (04 Dec 2017 05:01)  HR: 79 (04 Dec 2017 14:00) (74 - 87)  BP: 111/59 (04 Dec 2017 14:00) (104/52 - 111/59)  RR: 18 (04 Dec 2017 14:00) (18 - 18)  SpO2: 99% (04 Dec 2017 14:00) (99% - 100%)    Cardiovascular: Normal S1 S2,     No JVD, 1/6 YUSUF murmur, Peripheral pulses palpable 2+ bilaterally  Respiratory: Bibasilar crackles	  Gastrointestinal:  Soft, Non-tender, + BS	  Extremities no edema, cyanosis, clubbing B/L LE's     DIAGNOSTIC TESTING:    < from: Cardiac Cath Lab - Adult (10.13.17 @ 10:40) >  VENTRICLES: No left ventriculogram was performed.  CORONARY VESSELS: The coronary circulation is right dominant.  LM:   --  LM: Normal.  LAD:   --  Proximal LAD: There was a diffuse 60 % stenosis.  --  Mid LAD: There was a 100 % stenosis with the distal vessel being filled  via LIMA-LAD.  CX:   --  Circumflex: The vessel was small sized. Angiography showed mild  atherosclerosis withno flow limiting lesions.  --  OM1: Angiography showed mild atherosclerosis with no flow limiting  lesions.  RI:   --  Ostial ramus intermedius: There was a tubular 80 % stenosis.  There was PARUL grade 3 flow through the vessel (brisk flow).  RCA:   --  Proximal RCA: Angiography showed mild atherosclerosis with no  flow limiting lesions.  --  Mid RCA: There was a 100 % stenosis with the distal vessel being filled  via collaterals. This lesion is a chronic total occlusion.  GRAFTS:   --  Graft to the LAD: The graft was a LIMA. Graft angiography  showed minor luminal irregularities. Distal vessel angiography showed  minor luminal irregularities.  AORTA: Ascending aorta: Normal. No additional grafts visualized in  aortogram.  COMPLICATIONS: There were no complications.  DIAGNOSTIC RECOMMENDATIONS: Coronary angiogram demonstrates patent  LIMA-LAD, closed SVG grafts, and a severe stenosis in the ostial Ramus.  Will perform staged PCI to RI when Cr stable and renally optimized.  Prepared and signed by  Corie Ga M.D.  Signed 10/17/2017 13:49:15    < end of copied text >    < from: Cardiac Cath Lab - Adult (11.17.17 @ 11:48) >  PROCEDURE:  --  Intervention on ramus intermedius: drug-eluting stent.    VENTRICLES: No LV gram was performed; however, a recent echocardiogram  demonstrated normal global and regional LV function.  CORONARY VESSELS: The coronary circulation is right dominant.  LM:   --  LM: Angiography showed minor luminal irregularities with no flow  limiting lesions.  LAD:   --  Ostial LAD: There was a 100 % stenosis.  CX:   --  Circumflex: This vessel was not injected, but was visualized  during a prior cardiac catheterization.  RI:   --  Ramus intermedius: There was a 95 % stenosis.  RCA:   --  RCA: This vessel was not injected.  COMPLICATIONS: There were no complications.  DIAGNOSTIC RECOMMENDATIONS: The patient should continue with the present  medications. will add plavix 75mg po once a day/ will add ECASA 325mg po  once day.  Prepared and signed by  Roberta Quick M.D.  Signed 11/17/2017 13:16:01    < end of copied text >    < from: TTE with Doppler (w/Cont) (11.25.17 @ 12:33) >  CONCLUSIONS:  1. Mitral annular calcification, otherwise normal mitral  valve. Mild mitral regurgitation.  2. Normal left ventricular internal dimensions and wall  thicknesses.  3. Endocardium not well visualized; grossly moderate to  severe segmental left ventricularsystolic dysfunction.  Hypokinesis of the inferior, lateral, and inferolateral  walls.  Endocardial visualization enhanced with intravenous  injection of echo contrast (Definity).  No LV thrombus  seen.  4. The right ventricle is not well visualized;grossly  normal right ventricular systolic function.  ------------------------------------------------------------------------  Confirmed on  11/25/2017 - 14:44:41 by Jose Lucia M.D.    < end of copied text >    < from: CT Chest No Cont (12.01.17 @ 17:38) >  IMPRESSION:   No acute pulmonary disease.    JENA STEARNS D.O., RADIOLOGY RESIDENT  This document has been electronically signed.  MEAGAN SUMMERS M.D., ATTENDING RADIOLOGIST  This document has been electronically signed. Dec  2 2017 10:29AM    < end of copied text >        ASSESSMENT/PLAN: 	  71 yr old  Female w/ PMH HTN, CKD, Hyperlipidemia, DM-II, CAD s/p 3V CABG  (LIMA to LAD, SVG to OM, SVG to PDA) at [facility] 2014, s/P CORNELIA TO [facility] 11/17/17 presenting with shortness of breath, cough, Hypoxic in ED with O2 sats in the 70s, fevers, Leukocytosis and elevated lactate.  CXR showed pulmonary edema in ED. EKG with new intraventricular block and STD V3,V4,V5, CE elevated c/w NSTEMI    -- CT Chest 12/1 no acute pulmonary disease   --Pulm edema/acute decomp systolic HF, now compensated, cont PO Lasix (per Renal)  --CAD s/p CORNELIA to [facility] 11/17/17. Cont ASA, Plavix.   --NSTEMI and TTE with new moderate to severe LV dysfxn --plan for [facility] when stable from Renal perspective (?AM)       will hold lasix today and re-eval in AM, cont Mucomyst  -- Renal recommendations appreciated     Juliane Fitch PA-C  [facility]  [address], [address]   [place] [address]  office [phone]  pager [phone]

## 2017-12-04 NOTE — PROGRESS NOTE ADULT - ASSESSMENT
1.	MERVIN, initially sec to cardio-renal syndrome. Renal function was improving with diuresing, then worsen possible sec to hyperglycemia, improving today. patient clinically seemed to be euvolemic, lasix is on hold  2.	CKD stage 3: Baseline Scr 1.5-1.8  3.	CHF decompensation: Getting diuresed. Follow up cardiology  4.	Metabolic Acidosis: Better  5.	Hyponatremia: sec to hyperglycemia    PLAN:  1.	Continue lasix per cardiology  2.	Follow up renal function and electrolyte  3.	Planned for cardiac cath today. Start Mucomyst 1200mg BID for 4 doses, starting one day prior to the cardiac cath. Also give her NaHCO3 150meq/L in sterile water for 200cc/hr for one hr prior to cardiac cath and 75cc/hr for 6 hrs after the cardiac cath 1.	MERVIN, initially sec to cardio-renal syndrome. Renal function was improving with diuresing, then worsened possible sec to hyperglycemia, improving today. patient clinically seemed to be euvolemic, lasix is on hold  2.	CKD stage 3: Baseline Scr 1.5-1.8  3.	CHF decompensation: Getting diuresed. Follow up cardiology  4.	Metabolic Acidosis: Better  5.	Hyponatremia: sec to hyperglycemia    PLAN:  1.	Continue lasix per cardiology  2.	Follow up renal function and electrolyte  3.	Planned for cardiac cath today. Started Mucomyst 1200mg BID for 4 doses, starting one day prior to the cardiac cath. Also give her NaHCO3 150meq/L in sterile water for 200cc/hr for one hr prior to cardiac cath and 75cc/hr for 6 hrs after the cardiac cath

## 2017-12-04 NOTE — PROGRESS NOTE ADULT - SUBJECTIVE AND OBJECTIVE BOX
INTERVAL HPI/OVERNIGHT EVENTS: Pt seen and examined at bedside, no new complaints    MEDICATIONS  (STANDING):  acetylcysteine  Oral Solution 1200 milliGRAM(s) Oral two times a day  aspirin enteric coated 81 milliGRAM(s) Oral daily  atorvastatin 40 milliGRAM(s) Oral at bedtime  clopidogrel Tablet 75 milliGRAM(s) Oral daily  dextrose 5%. 1000 milliLiter(s) (50 mL/Hr) IV Continuous <Continuous>  dextrose 50% Injectable 12.5 Gram(s) IV Push once  dextrose 50% Injectable 25 Gram(s) IV Push once  dextrose 50% Injectable 25 Gram(s) IV Push once  docusate sodium 100 milliGRAM(s) Oral three times a day  insulin glargine Injectable (LANTUS) 65 Unit(s) SubCutaneous at bedtime  insulin lispro (HumaLOG) corrective regimen sliding scale   SubCutaneous three times a day before meals  insulin lispro (HumaLOG) corrective regimen sliding scale   SubCutaneous at bedtime  insulin lispro Injectable (HumaLOG) 24 Unit(s) SubCutaneous three times a day before meals  levothyroxine 50 MICROGram(s) Oral daily  metoprolol succinate ER 12.5 milliGRAM(s) Oral daily  montelukast 10 milliGRAM(s) Oral at bedtime  pantoprazole    Tablet 40 milliGRAM(s) Oral two times a day before meals  polyethylene glycol 3350 17 Gram(s) Oral daily  senna 2 Tablet(s) Oral at bedtime  sucralfate suspension 1 Gram(s) Oral four times a day    MEDICATIONS  (PRN):  dextrose Gel 1 Dose(s) Oral once PRN Blood Glucose LESS THAN 70 milliGRAM(s)/deciliter  glucagon  Injectable 1 milliGRAM(s) IntraMuscular once PRN Glucose LESS THAN 70 milligrams/deciliter  glucagon  Injectable 1 milliGRAM(s) IntraMuscular once PRN Glucose LESS THAN 70 milligrams/deciliter      Allergies    No Known Allergies    Intolerances        ROS:   General:  No wt loss, fevers, chills, night sweats, fatigue,   Eyes:  Good vision, no reported pain  ENT:  No sore throat, pain, runny nose, dysphagia  CV:  No pain, palpitations, hypo/hypertension  Resp:  No dyspnea, cough, tachypnea, wheezing  GI:  No pain, No nausea, No vomiting, No diarrhea, No constipation, No weight loss, No fever, No pruritis, No rectal bleeding, No tarry stools, No dysphagia,  :  No pain, bleeding, incontinence, nocturia  Muscle:  No pain, weakness  Neuro:  No weakness, tingling, memory problems  Psych:  No fatigue, insomnia, mood problems, depression  Endocrine:  No polyuria, polydipsia, cold/heat intolerance  Heme:  No petechiae, ecchymosis, easy bruisability  Skin:  No rash, tattoos, scars, edema      PHYSICAL EXAM:   Vital Signs:  Vital Signs Last 24 Hrs  T(C): 36.3 (04 Dec 2017 14:00), Max: 36.7 (04 Dec 2017 05:01)  T(F): 97.4 (04 Dec 2017 14:00), Max: 98 (04 Dec 2017 05:01)  HR: 79 (04 Dec 2017 14:) (74 - 87)  BP: 111/59 (04 Dec 2017 14:00) (104/52 - 111/59)  BP(mean): --  RR: 18 (04 Dec 2017 14:00) (18 - 18)  SpO2: 99% (04 Dec 2017 14:) (99% - 100%)  Daily     Daily Weight in k (04 Dec 2017 07:04)    GENERAL:  Appears stated age, well-groomed, well-nourished, no distress  HEENT:  NC/AT,  conjunctivae clear and pink, no thyromegaly, nodules, adenopathy, no JVD, sclera -anicteric  CHEST:  Full & symmetric excursion, no increased effort, breath sounds clear  HEART:  Regular rhythm, S1, S2, no murmur/rub/S3/S4, no abdominal bruit, no edema  ABDOMEN:  Soft, non-tender, non-distended, normoactive bowel sounds,  no masses ,no hepato-splenomegaly, no signs of chronic liver disease  EXTEREMITIES:  no cyanosis,clubbing or edema  SKIN:  No rash/erythema/ecchymoses/petechiae/wounds/abscess/warm/dry  NEURO:  Alert, oriented, no asterixis, no tremor, no encephalopathy      LABS:                        10.5   10.82 )-----------( 320      ( 04 Dec 2017 06:45 )             31.0     12-04    138  |  101  |  63<H>  ----------------------------<  231<H>  3.6   |  18<L>  |  1.94<H>    Ca    9.4      04 Dec 2017 06:45  Mg     2.1     -            RADIOLOGY & ADDITIONAL TESTS:

## 2017-12-04 NOTE — PROGRESS NOTE ADULT - PROBLEM SELECTOR PLAN 1
Will aaron NPH 12 units stat, continue Humalog coverage. Will continue monitoring FS, log, will Follow up.  Patient counseled for compliance with consistent low carb diet.

## 2017-12-04 NOTE — PROGRESS NOTE ADULT - SUBJECTIVE AND OBJECTIVE BOX
Chief complaint  Patient is a 72y old  Female who presents with a chief complaint of sob,chest pain (27 Nov 2017 11:05)   Review of systems  Patient in bed, looks comfortable, no fever, no hypoglycemia.    Labs and Fingersticks    CAPILLARY BLOOD GLUCOSE        Calcium, Total Serum: 9.4 (12-04 @ 06:45)  Calcium, Total Serum: 9.6 (12-03 @ 06:17)          12-04    138  |  101  |  63<H>  ----------------------------<  231<H>  3.6   |  18<L>  |  1.94<H>    Ca    9.4      04 Dec 2017 06:45  Mg     2.1     12-04                          10.5   10.82 )-----------( 320      ( 04 Dec 2017 06:45 )             31.0     Medications  MEDICATIONS  (STANDING):  acetylcysteine  Oral Solution 1200 milliGRAM(s) Oral two times a day  aspirin enteric coated 81 milliGRAM(s) Oral daily  atorvastatin 40 milliGRAM(s) Oral at bedtime  clopidogrel Tablet 75 milliGRAM(s) Oral daily  dextrose 5%. 1000 milliLiter(s) (50 mL/Hr) IV Continuous <Continuous>  dextrose 50% Injectable 12.5 Gram(s) IV Push once  dextrose 50% Injectable 25 Gram(s) IV Push once  dextrose 50% Injectable 25 Gram(s) IV Push once  docusate sodium 100 milliGRAM(s) Oral three times a day  insulin glargine Injectable (LANTUS) 65 Unit(s) SubCutaneous at bedtime  insulin lispro (HumaLOG) corrective regimen sliding scale   SubCutaneous three times a day before meals  insulin lispro (HumaLOG) corrective regimen sliding scale   SubCutaneous at bedtime  insulin lispro Injectable (HumaLOG) 24 Unit(s) SubCutaneous three times a day before meals  levothyroxine 50 MICROGram(s) Oral daily  metoprolol succinate ER 12.5 milliGRAM(s) Oral daily  montelukast 10 milliGRAM(s) Oral at bedtime  pantoprazole    Tablet 40 milliGRAM(s) Oral two times a day before meals  polyethylene glycol 3350 17 Gram(s) Oral daily  senna 2 Tablet(s) Oral at bedtime  sucralfate suspension 1 Gram(s) Oral four times a day      Physical Exam  General: Patient comfortable in bed  Vital Signs Last 12 Hrs  T(F): 98 (12-04-17 @ 05:01), Max: 98 (12-04-17 @ 05:01)  HR: 74 (12-04-17 @ 05:01) (74 - 74)  BP: 109/54 (12-04-17 @ 05:01) (109/54 - 109/54)  BP(mean): --  RR: 18 (12-04-17 @ 05:01) (18 - 18)  SpO2: 100% (12-04-17 @ 05:01) (100% - 100%)  Neck: No palpable thyroid nodules.  CVS: S1S2, No murmurs  Respiratory: No wheezing, no crepitations  GI: Abdomen soft, bowel sounds positive  Musculoskeletal: Positive edema lower extremities.   Skin: No skin rashes, no ecchymosis    Diagnostics

## 2017-12-04 NOTE — PROGRESS NOTE ADULT - ASSESSMENT
Assessment  DMT2: 71y Female with DM T2 with hyperglycemia on insulin, blood sugars high.  NPO today did not get insulin, no hypoglycemia.  Hypothyroidism:  On synthroid 75 mcg po daily.  HTN: Controlled, On med.  CAD: On medications, monitored.

## 2017-12-04 NOTE — PROGRESS NOTE ADULT - SUBJECTIVE AND OBJECTIVE BOX
Dr. Muhammad (Nephrology)  Office [phone]  Cell [phone]  [facility]  Cell [phone]      Patient is a 72y old  Female who presents with a chief complaint of sob,chest pain (27 Nov 2017 11:05)      Patient seen and examined at bedside. No chest pain/sob    VITALS:  T(F): 97.4 (12-04-17 @ 14:00), Max: 98 (12-04-17 @ 05:01)  HR: 79 (12-04-17 @ 14:00)  BP: 111/59 (12-04-17 @ 14:00)  RR: 18 (12-04-17 @ 14:00)  SpO2: 99% (12-04-17 @ 14:00)  Wt(kg): --        PHYSICAL EXAM:  Constitutional: NAD  Neck: No JVD  Respiratory: CTAB, no wheezes, rales or rhonchi  Cardiovascular: S1, S2, RRR  Gastrointestinal: BS+, soft, NT/ND  Extremities: No peripheral edema    Hospital Medications:   MEDICATIONS  (STANDING):  acetylcysteine  Oral Solution 1200 milliGRAM(s) Oral two times a day  aspirin enteric coated 81 milliGRAM(s) Oral daily  atorvastatin 40 milliGRAM(s) Oral at bedtime  clopidogrel Tablet 75 milliGRAM(s) Oral daily  dextrose 5%. 1000 milliLiter(s) (50 mL/Hr) IV Continuous <Continuous>  dextrose 50% Injectable 12.5 Gram(s) IV Push once  dextrose 50% Injectable 25 Gram(s) IV Push once  dextrose 50% Injectable 25 Gram(s) IV Push once  docusate sodium 100 milliGRAM(s) Oral three times a day  insulin glargine Injectable (LANTUS) 65 Unit(s) SubCutaneous at bedtime  insulin lispro (HumaLOG) corrective regimen sliding scale   SubCutaneous three times a day before meals  insulin lispro (HumaLOG) corrective regimen sliding scale   SubCutaneous at bedtime  insulin lispro Injectable (HumaLOG) 24 Unit(s) SubCutaneous three times a day before meals  levothyroxine 50 MICROGram(s) Oral daily  metoprolol succinate ER 12.5 milliGRAM(s) Oral daily  montelukast 10 milliGRAM(s) Oral at bedtime  pantoprazole    Tablet 40 milliGRAM(s) Oral two times a day before meals  polyethylene glycol 3350 17 Gram(s) Oral daily  senna 2 Tablet(s) Oral at bedtime  sucralfate suspension 1 Gram(s) Oral four times a day      LABS:  12-04    138  |  101  |  63<H>  ----------------------------<  231<H>  3.6   |  18<L>  |  1.94<H>    Ca    9.4      04 Dec 2017 06:45  Mg     2.1     12-04      Creatinine Trend: 1.94 <--, 2.25 <--, 2.01 <--, 2.00 <--, 2.25 <--, 2.06 <--, 2.33 <--                                10.5   10.82 )-----------( 320      ( 04 Dec 2017 06:45 )             31.0     Urine Studies:  Urinalysis - [11-25-17 @ 01:00]      Color PLYEL / Appearance CLEAR / SG 1.020 / pH 6.0      Gluc >1000 / Ketone NEGATIVE  / Bili NEGATIVE / Urobili NORMAL       Blood TRACE / Protein 150 / Leuk Est NEGATIVE / Nitrite NEGATIVE      RBC 2-5 / WBC 10-25 / Hyaline 2-5 / Gran  / Sq Epi OCC / Non Sq Epi  / Bacteria FEW      HbA1c 9.1      [11-25-17 @ 06:30]  TSH 0.79      [11-25-17 @ 06:30]  Lipid: chol 136, , HDL 37, LDL 80      [11-25-17 @ 06:30]        RADIOLOGY & ADDITIONAL STUDIES:

## 2017-12-05 LAB
BUN SERPL-MCNC: 54 MG/DL — HIGH (ref 7–23)
CALCIUM SERPL-MCNC: 9.3 MG/DL — SIGNIFICANT CHANGE UP (ref 8.4–10.5)
CHLORIDE SERPL-SCNC: 103 MMOL/L — SIGNIFICANT CHANGE UP (ref 98–107)
CO2 SERPL-SCNC: 18 MMOL/L — LOW (ref 22–31)
CREAT SERPL-MCNC: 1.81 MG/DL — HIGH (ref 0.5–1.3)
GLUCOSE BLDC GLUCOMTR-MCNC: 177 MG/DL — HIGH (ref 70–99)
GLUCOSE BLDC GLUCOMTR-MCNC: 254 MG/DL — HIGH (ref 70–99)
GLUCOSE BLDC GLUCOMTR-MCNC: 88 MG/DL — SIGNIFICANT CHANGE UP (ref 70–99)
GLUCOSE SERPL-MCNC: 198 MG/DL — HIGH (ref 70–99)
HCT VFR BLD CALC: 32.4 % — LOW (ref 34.5–45)
HGB BLD-MCNC: 10.5 G/DL — LOW (ref 11.5–15.5)
MAGNESIUM SERPL-MCNC: 2.2 MG/DL — SIGNIFICANT CHANGE UP (ref 1.6–2.6)
MCHC RBC-ENTMCNC: 28.2 PG — SIGNIFICANT CHANGE UP (ref 27–34)
MCHC RBC-ENTMCNC: 32.4 % — SIGNIFICANT CHANGE UP (ref 32–36)
MCV RBC AUTO: 87.1 FL — SIGNIFICANT CHANGE UP (ref 80–100)
NRBC # FLD: 0 — SIGNIFICANT CHANGE UP
PHOSPHATE SERPL-MCNC: 3.1 MG/DL — SIGNIFICANT CHANGE UP (ref 2.5–4.5)
PLATELET # BLD AUTO: 355 K/UL — SIGNIFICANT CHANGE UP (ref 150–400)
PMV BLD: 9 FL — SIGNIFICANT CHANGE UP (ref 7–13)
POTASSIUM SERPL-MCNC: 3.2 MMOL/L — LOW (ref 3.5–5.3)
POTASSIUM SERPL-SCNC: 3.2 MMOL/L — LOW (ref 3.5–5.3)
RBC # BLD: 3.72 M/UL — LOW (ref 3.8–5.2)
RBC # FLD: 13.5 % — SIGNIFICANT CHANGE UP (ref 10.3–14.5)
SODIUM SERPL-SCNC: 139 MMOL/L — SIGNIFICANT CHANGE UP (ref 135–145)
WBC # BLD: 10.68 K/UL — HIGH (ref 3.8–10.5)
WBC # FLD AUTO: 10.68 K/UL — HIGH (ref 3.8–10.5)

## 2017-12-05 PROCEDURE — 93010 ELECTROCARDIOGRAM REPORT: CPT

## 2017-12-05 RX ORDER — TICAGRELOR 90 MG/1
180 TABLET ORAL ONCE
Qty: 0 | Refills: 0 | Status: COMPLETED | OUTPATIENT
Start: 2017-12-05 | End: 2017-12-05

## 2017-12-05 RX ORDER — TICAGRELOR 90 MG/1
90 TABLET ORAL
Qty: 0 | Refills: 0 | Status: DISCONTINUED | OUTPATIENT
Start: 2017-12-06 | End: 2017-12-11

## 2017-12-05 RX ORDER — POTASSIUM CHLORIDE 20 MEQ
40 PACKET (EA) ORAL ONCE
Qty: 0 | Refills: 0 | Status: COMPLETED | OUTPATIENT
Start: 2017-12-05 | End: 2017-12-05

## 2017-12-05 RX ADMIN — Medication 24 UNIT(S): at 08:48

## 2017-12-05 RX ADMIN — PANTOPRAZOLE SODIUM 40 MILLIGRAM(S): 20 TABLET, DELAYED RELEASE ORAL at 16:48

## 2017-12-05 RX ADMIN — Medication 1200 MILLIGRAM(S): at 05:26

## 2017-12-05 RX ADMIN — TICAGRELOR 180 MILLIGRAM(S): 90 TABLET ORAL at 16:48

## 2017-12-05 RX ADMIN — Medication 81 MILLIGRAM(S): at 10:57

## 2017-12-05 RX ADMIN — CLOPIDOGREL BISULFATE 75 MILLIGRAM(S): 75 TABLET, FILM COATED ORAL at 10:57

## 2017-12-05 RX ADMIN — Medication 12.5 MILLIGRAM(S): at 05:26

## 2017-12-05 RX ADMIN — Medication 40 MILLIEQUIVALENT(S): at 16:49

## 2017-12-05 RX ADMIN — INSULIN GLARGINE 65 UNIT(S): 100 INJECTION, SOLUTION SUBCUTANEOUS at 22:12

## 2017-12-05 RX ADMIN — Medication 2: at 22:12

## 2017-12-05 RX ADMIN — Medication 100 MILLIGRAM(S): at 21:34

## 2017-12-05 RX ADMIN — Medication 100 MILLIGRAM(S): at 05:26

## 2017-12-05 RX ADMIN — Medication 2: at 08:47

## 2017-12-05 RX ADMIN — Medication 50 MICROGRAM(S): at 05:26

## 2017-12-05 RX ADMIN — PANTOPRAZOLE SODIUM 40 MILLIGRAM(S): 20 TABLET, DELAYED RELEASE ORAL at 05:26

## 2017-12-05 RX ADMIN — SENNA PLUS 2 TABLET(S): 8.6 TABLET ORAL at 21:33

## 2017-12-05 RX ADMIN — Medication 1 GRAM(S): at 00:50

## 2017-12-05 RX ADMIN — ATORVASTATIN CALCIUM 40 MILLIGRAM(S): 80 TABLET, FILM COATED ORAL at 21:33

## 2017-12-05 RX ADMIN — Medication 1 GRAM(S): at 21:33

## 2017-12-05 RX ADMIN — Medication 1 GRAM(S): at 05:26

## 2017-12-05 RX ADMIN — MONTELUKAST 10 MILLIGRAM(S): 4 TABLET, CHEWABLE ORAL at 21:33

## 2017-12-05 RX ADMIN — Medication 200 MEQ/KG/HR: at 10:57

## 2017-12-05 NOTE — PROGRESS NOTE ADULT - SUBJECTIVE AND OBJECTIVE BOX
Dr. Muhammad (Nephrology)  Office [phone]  Cell [phone]  [facility]  Cell [phone]      Patient is a 72y old  Female who presents with a chief complaint of sob,chest pain (27 Nov 2017 11:05)      Patient seen and examined at bedside. No chest pain/sob    VITALS:  T(F): 97.9 (12-05-17 @ 05:04), Max: 97.9 (12-05-17 @ 05:04)  HR: 75 (12-05-17 @ 05:04)  BP: 122/59 (12-05-17 @ 05:04)  RR: 18 (12-05-17 @ 05:04)  SpO2: 99% (12-05-17 @ 05:04)  Wt(kg): --        PHYSICAL EXAM:  Constitutional: NAD  Neck: No JVD  Respiratory: CTAB, no wheezes, rales or rhonchi  Cardiovascular: S1, S2, RRR  Gastrointestinal: BS+, soft, NT/ND  Extremities: No peripheral edema    Hospital Medications:   MEDICATIONS  (STANDING):  acetylcysteine  Oral Solution 1200 milliGRAM(s) Oral two times a day  aspirin enteric coated 81 milliGRAM(s) Oral daily  atorvastatin 40 milliGRAM(s) Oral at bedtime  clopidogrel Tablet 75 milliGRAM(s) Oral daily  dextrose 5%. 1000 milliLiter(s) (50 mL/Hr) IV Continuous <Continuous>  dextrose 50% Injectable 12.5 Gram(s) IV Push once  dextrose 50% Injectable 25 Gram(s) IV Push once  dextrose 50% Injectable 25 Gram(s) IV Push once  docusate sodium 100 milliGRAM(s) Oral three times a day  insulin glargine Injectable (LANTUS) 65 Unit(s) SubCutaneous at bedtime  insulin lispro (HumaLOG) corrective regimen sliding scale   SubCutaneous three times a day before meals  insulin lispro (HumaLOG) corrective regimen sliding scale   SubCutaneous at bedtime  insulin lispro Injectable (HumaLOG) 24 Unit(s) SubCutaneous three times a day before meals  levothyroxine 50 MICROGram(s) Oral daily  metoprolol succinate ER 12.5 milliGRAM(s) Oral daily  montelukast 10 milliGRAM(s) Oral at bedtime  pantoprazole    Tablet 40 milliGRAM(s) Oral two times a day before meals  polyethylene glycol 3350 17 Gram(s) Oral daily  senna 2 Tablet(s) Oral at bedtime  sodium bicarbonate  Infusion 0.53 mEq/kG/Hr (200 mL/Hr) IV Continuous <Continuous>  sodium bicarbonate  Infusion 0.199 mEq/kG/Hr (75 mL/Hr) IV Continuous <Continuous>  sucralfate suspension 1 Gram(s) Oral four times a day      LABS:  12-05    139  |  103  |  54<H>  ----------------------------<  198<H>  3.2<L>   |  18<L>  |  1.81<H>    Ca    9.3      05 Dec 2017 05:56  Phos  3.1     12-05  Mg     2.2     12-05      Creatinine Trend: 1.81 <--, 1.94 <--, 2.25 <--, 2.01 <--, 2.00 <--, 2.25 <--, 2.06 <--, 2.33 <--    Phosphorus Level, Serum: 3.1 mg/dL (12-05 @ 05:56)                              10.5   10.68 )-----------( 355      ( 05 Dec 2017 05:56 )             32.4     Urine Studies:  Urinalysis - [11-25-17 @ 01:00]      Color PLYEL / Appearance CLEAR / SG 1.020 / pH 6.0      Gluc >1000 / Ketone NEGATIVE  / Bili NEGATIVE / Urobili NORMAL       Blood TRACE / Protein 150 / Leuk Est NEGATIVE / Nitrite NEGATIVE      RBC 2-5 / WBC 10-25 / Hyaline 2-5 / Gran  / Sq Epi OCC / Non Sq Epi  / Bacteria FEW      HbA1c 9.1      [11-25-17 @ 06:30]  TSH 0.79      [11-25-17 @ 06:30]  Lipid: chol 136, , HDL 37, LDL 80      [11-25-17 @ 06:30]        RADIOLOGY & ADDITIONAL STUDIES: Dr. Muhammad (Nephrology)  Office [phone]  Cell [phone]  [facility]  Cell [phone]      Patient is a 72y old  Female who presents with a chief complaint of sob,chest pain (27 Nov 2017 11:05)      Patient seen and examined at bedside. No chest pain/sob    VITALS:  T(F): 97.9 (12-05-17 @ 05:04), Max: 97.9 (12-05-17 @ 05:04)  HR: 75 (12-05-17 @ 05:04)  BP: 122/59 (12-05-17 @ 05:04)  RR: 18 (12-05-17 @ 05:04)  SpO2: 99% (12-05-17 @ 05:04)  Wt(kg): --        PHYSICAL EXAM:  Constitutional: NAD  Neck: No JVD  Respiratory: CTAB, no wheezes, rales or rhonchi  Cardiovascular: S1, S2, RRR  Gastrointestinal: BS+, soft, NT/ND  Extremities: No peripheral edema    Hospital Medications:   MEDICATIONS  (STANDING):  acetylcysteine  Oral Solution 1200 milliGRAM(s) Oral two times a day  aspirin enteric coated 81 milliGRAM(s) Oral daily  atorvastatin 40 milliGRAM(s) Oral at bedtime  clopidogrel Tablet 75 milliGRAM(s) Oral daily  dextrose 5%. 1000 milliLiter(s) (50 mL/Hr) IV Continuous <Continuous>  dextrose 50% Injectable 12.5 Gram(s) IV Push once  dextrose 50% Injectable 25 Gram(s) IV Push once  dextrose 50% Injectable 25 Gram(s) IV Push once  docusate sodium 100 milliGRAM(s) Oral three times a day  insulin glargine Injectable (LANTUS) 65 Unit(s) SubCutaneous at bedtime  insulin lispro (HumaLOG) corrective regimen sliding scale   SubCutaneous three times a day before meals  insulin lispro (HumaLOG) corrective regimen sliding scale   SubCutaneous at bedtime  insulin lispro Injectable (HumaLOG) 24 Unit(s) SubCutaneous three times a day before meals  levothyroxine 50 MICROGram(s) Oral daily  metoprolol succinate ER 12.5 milliGRAM(s) Oral daily  montelukast 10 milliGRAM(s) Oral at bedtime  pantoprazole    Tablet 40 milliGRAM(s) Oral two times a day before meals  polyethylene glycol 3350 17 Gram(s) Oral daily  senna 2 Tablet(s) Oral at bedtime  sodium bicarbonate  Infusion 0.53 mEq/kG/Hr (200 mL/Hr) IV Continuous <Continuous>  sodium bicarbonate  Infusion 0.199 mEq/kG/Hr (75 mL/Hr) IV Continuous <Continuous>  sucralfate suspension 1 Gram(s) Oral four times a day      LABS:  12-05    139  |  103  |  54<H>  ----------------------------<  198<H>  3.2<L>   |  18<L>  |  1.81<H>    Ca    9.3      05 Dec 2017 05:56  Phos  3.1     12-05  Mg     2.2     12-05      Creatinine Trend: 1.81 <--, 1.94 <--, 2.25 <--, 2.01 <--, 2.00 <--, 2.25 <--, 2.06 <--, 2.33 <--    Phosphorus Level, Serum: 3.1 mg/dL (12-05 @ 05:56)                              10.5   10.68 )-----------( 355      ( 05 Dec 2017 05:56 )             32.4     Urine Studies:  Urinalysis - [11-25-17 @ 01:00]      Color PLYEL / Appearance CLEAR / SG 1.020 / pH 6.0      Gluc >1000 / Ketone NEGATIVE  / Bili NEGATIVE / Urobili NORMAL       Blood TRACE / Protein 150 / Leuk Est NEGATIVE / Nitrite NEGATIVE      RBC 2-5 / WBC 10-25 / Hyaline 2-5 / Gran  / Sq Epi OCC / Non Sq Epi  / Bacteria FEW      HbA1c 9.1      [11-25-17 @ 06:30]  TSH 0.79      [11-25-17 @ 06:30]  Lipid: chol 136, , HDL 37, LDL 80      [11-25-17 @ 06:30]        RADIOLOGY & ADDITIONAL STUDIES:

## 2017-12-05 NOTE — PROGRESS NOTE ADULT - PROBLEM SELECTOR PLAN 1
- NSTEMI and TTE with new moderate to severe LV dysfxn  - cardiology work up appreciated  - keep on ppi for gi ppx while in a/c  - cardiac cath today, f/up cards recs

## 2017-12-05 NOTE — PROGRESS NOTE ADULT - ASSESSMENT
1.	MERVIN, initially sec to cardio-renal syndrome. Renal function was improving with diuresing, then worsened possible sec to hyperglycemia, improving today. patient clinically seemed to be euvolemic, lasix is on hold  2.	CKD stage 3: Baseline Scr 1.5-1.8  3.	CHF decompensation:  Follow up cardiology  4.	Metabolic Acidosis: Better  5.	Hyponatremia: sec to hyperglycemia  6.	Hypokalemia    PLAN:  1.	Lasix on hold. clinically euvolemic right now. f/u cardiology  2.	Follow up renal function and electrolyte  3.	supplement KCl 40meqx 1  4.	possible cath today, mucomyst and sodium bicarb gtt as ordered

## 2017-12-05 NOTE — PROGRESS NOTE ADULT - SUBJECTIVE AND OBJECTIVE BOX
INTERVAL HPI/OVERNIGHT EVENTS: Pt seen and examined at bedside, getting cath today    MEDICATIONS  (STANDING):  acetylcysteine  Oral Solution 1200 milliGRAM(s) Oral two times a day  aspirin enteric coated 81 milliGRAM(s) Oral daily  atorvastatin 40 milliGRAM(s) Oral at bedtime  clopidogrel Tablet 75 milliGRAM(s) Oral daily  dextrose 5%. 1000 milliLiter(s) (50 mL/Hr) IV Continuous <Continuous>  dextrose 50% Injectable 12.5 Gram(s) IV Push once  dextrose 50% Injectable 25 Gram(s) IV Push once  dextrose 50% Injectable 25 Gram(s) IV Push once  docusate sodium 100 milliGRAM(s) Oral three times a day  insulin glargine Injectable (LANTUS) 65 Unit(s) SubCutaneous at bedtime  insulin lispro (HumaLOG) corrective regimen sliding scale   SubCutaneous three times a day before meals  insulin lispro (HumaLOG) corrective regimen sliding scale   SubCutaneous at bedtime  insulin lispro Injectable (HumaLOG) 24 Unit(s) SubCutaneous three times a day before meals  levothyroxine 50 MICROGram(s) Oral daily  metoprolol succinate ER 12.5 milliGRAM(s) Oral daily  montelukast 10 milliGRAM(s) Oral at bedtime  pantoprazole    Tablet 40 milliGRAM(s) Oral two times a day before meals  polyethylene glycol 3350 17 Gram(s) Oral daily  potassium chloride    Tablet ER 40 milliEquivalent(s) Oral once  senna 2 Tablet(s) Oral at bedtime  sodium bicarbonate  Infusion 0.53 mEq/kG/Hr (200 mL/Hr) IV Continuous <Continuous>  sodium bicarbonate  Infusion 0.199 mEq/kG/Hr (75 mL/Hr) IV Continuous <Continuous>  sucralfate suspension 1 Gram(s) Oral four times a day    MEDICATIONS  (PRN):  dextrose Gel 1 Dose(s) Oral once PRN Blood Glucose LESS THAN 70 milliGRAM(s)/deciliter  glucagon  Injectable 1 milliGRAM(s) IntraMuscular once PRN Glucose LESS THAN 70 milligrams/deciliter  glucagon  Injectable 1 milliGRAM(s) IntraMuscular once PRN Glucose LESS THAN 70 milligrams/deciliter      Allergies    No Known Allergies    Intolerances        ROS:   General:  No wt loss, fevers, chills, night sweats, fatigue,   Eyes:  Good vision, no reported pain  ENT:  No sore throat, pain, runny nose, dysphagia  CV:  No pain, palpitations, hypo/hypertension  Resp:  No dyspnea, cough, tachypnea, wheezing  GI:  No pain, No nausea, No vomiting, No diarrhea, No constipation, No weight loss, No fever, No pruritis, No rectal bleeding, No tarry stools, No dysphagia,  :  No pain, bleeding, incontinence, nocturia  Muscle:  No pain, weakness  Neuro:  No weakness, tingling, memory problems  Psych:  No fatigue, insomnia, mood problems, depression  Endocrine:  No polyuria, polydipsia, cold/heat intolerance  Heme:  No petechiae, ecchymosis, easy bruisability  Skin:  No rash, tattoos, scars, edema      PHYSICAL EXAM:   Vital Signs:  Vital Signs Last 24 Hrs  T(C): 36.6 (05 Dec 2017 05:04), Max: 36.6 (05 Dec 2017 05:04)  T(F): 97.9 (05 Dec 2017 05:04), Max: 97.9 (05 Dec 2017 05:04)  HR: 75 (05 Dec 2017 05:04) (75 - 79)  BP: 122/59 (05 Dec 2017 05:04) (111/59 - 122/59)  BP(mean): --  RR: 18 (05 Dec 2017 05:04) (18 - 18)  SpO2: 99% (05 Dec 2017 05:04) (98% - 99%)  Daily     Daily     GENERAL:  Appears stated age, well-groomed, well-nourished, no distress  HEENT:  NC/AT,  conjunctivae clear and pink, no thyromegaly, nodules, adenopathy, no JVD, sclera -anicteric  CHEST:  Full & symmetric excursion, no increased effort, breath sounds clear  HEART:  Regular rhythm, S1, S2, no murmur/rub/S3/S4, no abdominal bruit, no edema  ABDOMEN:  Soft, non-tender, non-distended, normoactive bowel sounds,  no masses ,no hepato-splenomegaly, no signs of chronic liver disease  EXTEREMITIES:  no cyanosis,clubbing or edema  SKIN:  No rash/erythema/ecchymoses/petechiae/wounds/abscess/warm/dry  NEURO:  Alert, oriented, no asterixis, no tremor, no encephalopathy      LABS:                        10.5   10.68 )-----------( 355      ( 05 Dec 2017 05:56 )             32.4     12-05    139  |  103  |  54<H>  ----------------------------<  198<H>  3.2<L>   |  18<L>  |  1.81<H>    Ca    9.3      05 Dec 2017 05:56  Phos  3.1     12-05  Mg     2.2     12-05            RADIOLOGY & ADDITIONAL TESTS:

## 2017-12-05 NOTE — PROGRESS NOTE ADULT - SUBJECTIVE AND OBJECTIVE BOX
Patient denies anginal chest pain or shortness of breath    MEDICATIONS  (STANDING):  acetylcysteine  Oral Solution 1200 milliGRAM(s) Oral two times a day  aspirin enteric coated 81 milliGRAM(s) Oral daily  atorvastatin 40 milliGRAM(s) Oral at bedtime  clopidogrel Tablet 75 milliGRAM(s) Oral daily  docusate sodium 100 milliGRAM(s) Oral three times a day  insulin glargine Injectable (LANTUS) 65 Unit(s) SubCutaneous at bedtime  insulin lispro (HumaLOG) corrective regimen sliding scale   SubCutaneous three times a day before meals  insulin lispro (HumaLOG) corrective regimen sliding scale   SubCutaneous at bedtime  insulin lispro Injectable (HumaLOG) 24 Unit(s) SubCutaneous three times a day before meals  levothyroxine 50 MICROGram(s) Oral daily  metoprolol succinate ER 12.5 milliGRAM(s) Oral daily  montelukast 10 milliGRAM(s) Oral at bedtime  pantoprazole    Tablet 40 milliGRAM(s) Oral two times a day before meals  polyethylene glycol 3350 17 Gram(s) Oral daily  potassium chloride    Tablet ER 40 milliEquivalent(s) Oral once  senna 2 Tablet(s) Oral at bedtime  sodium bicarbonate  Infusion 0.53 mEq/kG/Hr (200 mL/Hr) IV Continuous <Continuous>  sodium bicarbonate  Infusion 0.199 mEq/kG/Hr (75 mL/Hr) IV Continuous <Continuous>  sucralfate suspension 1 Gram(s) Oral four times a day    LABS:                        10.5   10.68 )-----------( 355      ( 05 Dec 2017 05:56 )             32.4     139  |  103  |  54<H>  ----------------------------<  198<H>  3.2<L>   |  18<L>  |  1.81<H>    Ca    9.3      05 Dec 2017 05:56  Phos  3.1     12-05  Mg     2.2     12-05    PHYSICAL EXAM  Vital Signs Last 24 Hrs  T(C): 36.6 (05 Dec 2017 05:04), Max: 36.6 (05 Dec 2017 05:04)  T(F): 97.9 (05 Dec 2017 05:04), Max: 97.9 (05 Dec 2017 05:04)  HR: 75 (05 Dec 2017 05:04) (75 - 79)  BP: 122/59 (05 Dec 2017 05:04) (111/59 - 122/59)  RR: 18 (05 Dec 2017 05:04) (18 - 18)  SpO2: 99% (05 Dec 2017 05:04) (98% - 99%)    Cardiovascular: Normal S1 S2,     No JVD, 1/6 YUSUF murmur, Peripheral pulses palpable 2+ bilaterally  Respiratory: Bibasilar crackles	  Gastrointestinal:  Soft, Non-tender, + BS	  Extremities no edema, cyanosis, clubbing B/L LE's     DIAGNOSTIC TESTING:    < from: Cardiac Cath Lab - Adult (10.13.17 @ 10:40) >  VENTRICLES: No left ventriculogram was performed.  CORONARY VESSELS: The coronary circulation is right dominant.  LM:   --  LM: Normal.  LAD:   --  Proximal LAD: There was a diffuse 60 % stenosis.  --  Mid LAD: There was a 100 % stenosis with the distal vessel being filled  via LIMA-LAD.  CX:   --  Circumflex: The vessel was small sized. Angiography showed mild  atherosclerosis withno flow limiting lesions.  --  OM1: Angiography showed mild atherosclerosis with no flow limiting  lesions.  RI:   --  Ostial ramus intermedius: There was a tubular 80 % stenosis.  There was PARUL grade 3 flow through the vessel (brisk flow).  RCA:   --  Proximal RCA: Angiography showed mild atherosclerosis with no  flow limiting lesions.  --  Mid RCA: There was a 100 % stenosis with the distal vessel being filled  via collaterals. This lesion is a chronic total occlusion.  GRAFTS:   --  Graft to the LAD: The graft was a LIMA. Graft angiography  showed minor luminal irregularities. Distal vessel angiography showed  minor luminal irregularities.  AORTA: Ascending aorta: Normal. No additional grafts visualized in  aortogram.  COMPLICATIONS: There were no complications.  DIAGNOSTIC RECOMMENDATIONS: Coronary angiogram demonstrates patent  LIMA-LAD, closed SVG grafts, and a severe stenosis in the ostial Ramus.  Will perform staged PCI to RI when Cr stable and renally optimized.  Prepared and signed by  Corie Ga M.D.  Signed 10/17/2017 13:49:15    < end of copied text >    < from: Cardiac Cath Lab - Adult (11.17.17 @ 11:48) >  PROCEDURE:  --  Intervention on ramus intermedius: drug-eluting stent.    VENTRICLES: No LV gram was performed; however, a recent echocardiogram  demonstrated normal global and regional LV function.  CORONARY VESSELS: The coronary circulation is right dominant.  LM:   --  LM: Angiography showed minor luminal irregularities with no flow  limiting lesions.  LAD:   --  Ostial LAD: There was a 100 % stenosis.  CX:   --  Circumflex: This vessel was not injected, but was visualized  during a prior cardiac catheterization.  RI:   --  Ramus intermedius: There was a 95 % stenosis.  RCA:   --  RCA: This vessel was not injected.  COMPLICATIONS: There were no complications.  DIAGNOSTIC RECOMMENDATIONS: The patient should continue with the present  medications. will add plavix 75mg po once a day/ will add ECASA 325mg po  once day.  Prepared and signed by  Roberta Quick M.D.  Signed 11/17/2017 13:16:01    < end of copied text >    < from: TTE with Doppler (w/Cont) (11.25.17 @ 12:33) >  CONCLUSIONS:  1. Mitral annular calcification, otherwise normal mitral  valve. Mild mitral regurgitation.  2. Normal left ventricular internal dimensions and wall  thicknesses.  3. Endocardium not well visualized; grossly moderate to  severe segmental left ventricularsystolic dysfunction.  Hypokinesis of the inferior, lateral, and inferolateral  walls.  Endocardial visualization enhanced with intravenous  injection of echo contrast (Definity).  No LV thrombus  seen.  4. The right ventricle is not well visualized;grossly  normal right ventricular systolic function.  ------------------------------------------------------------------------  Confirmed on  11/25/2017 - 14:44:41 by Jose Lucia M.D.    < end of copied text >    < from: CT Chest No Cont (12.01.17 @ 17:38) >  IMPRESSION:   No acute pulmonary disease.    JENA STEARNS D.O., RADIOLOGY RESIDENT  This document has been electronically signed.  MEAGAN SUMMERS M.D., ATTENDING RADIOLOGIST  This document has been electronically signed. Dec  2 2017 10:29AM    < end of copied text >      ASSESSMENT/PLAN: 	  71 yr old  Female w/ PMH HTN, CKD, Hyperlipidemia, DM-II, CAD s/p 3V CABG  (LIMA to LAD, SVG to OM, SVG to PDA) at [facility] 2014, s/P CORNELIA TO RI 11/17/17 presenting with shortness of breath, cough, Hypoxic in ED with O2 sats in the 70s, fevers, Leukocytosis and elevated lactate.  CXR showed pulmonary edema in ED. EKG with new intraventricular block and STD V3,V4,V5, CE elevated c/w NSTEMI    -- CT Chest 12/1 no acute pulmonary disease   --Pulm edema/acute decomp systolic HF, now compensated, cont PO Lasix (per Renal)  --CAD s/p CORNELIA to RI 11/17/17. Cont ASA, Plavix.   --NSTEMI and TTE with new moderate to severe LV dysfxn --plan for [facility] today  -- Renal recommendations appreciated     Juliane Fitch PA-C  [facility]  [address], [address]   [place] [address]  office [phone]  pager [phone]

## 2017-12-05 NOTE — CHART NOTE - NSCHARTNOTEFT_GEN_A_CORE
s/p cath with LVEDP 26, contrast dye load about 50cc as discussed with Dr SHI Ga at which renal recs noted, however, will hold off on sodium bicarb drip post cath given risk of pulmonary edema. Continue mucomyst PO as ordered and monitor Cr closely. Lasix still on hold for now. Also Plavix to be changed to brilinta 90mg po BID starting 12//17, loaded in cath recovery.   Follow up renal recs

## 2017-12-05 NOTE — PROGRESS NOTE ADULT - SUBJECTIVE AND OBJECTIVE BOX
Chief complaint  Patient is a 72y old  Female who presents with a chief complaint of sob,chest pain (27 Nov 2017 11:05)   Review of systems  Patient in bed, looks comfortable, no fever, no hypoglycemia.    Labs and Fingersticks    CAPILLARY BLOOD GLUCOSE        Calcium, Total Serum: 9.3 (12-05 @ 05:56)  Calcium, Total Serum: 9.4 (12-04 @ 06:45)          12-05    139  |  103  |  54<H>  ----------------------------<  198<H>  3.2<L>   |  18<L>  |  1.81<H>    Ca    9.3      05 Dec 2017 05:56  Phos  3.1     12-05  Mg     2.2     12-05                          10.5   10.68 )-----------( 355      ( 05 Dec 2017 05:56 )             32.4     Medications  MEDICATIONS  (STANDING):  acetylcysteine  Oral Solution 1200 milliGRAM(s) Oral two times a day  aspirin enteric coated 81 milliGRAM(s) Oral daily  atorvastatin 40 milliGRAM(s) Oral at bedtime  dextrose 5%. 1000 milliLiter(s) (50 mL/Hr) IV Continuous <Continuous>  dextrose 50% Injectable 12.5 Gram(s) IV Push once  dextrose 50% Injectable 25 Gram(s) IV Push once  dextrose 50% Injectable 25 Gram(s) IV Push once  docusate sodium 100 milliGRAM(s) Oral three times a day  insulin glargine Injectable (LANTUS) 65 Unit(s) SubCutaneous at bedtime  insulin lispro (HumaLOG) corrective regimen sliding scale   SubCutaneous three times a day before meals  insulin lispro (HumaLOG) corrective regimen sliding scale   SubCutaneous at bedtime  insulin lispro Injectable (HumaLOG) 24 Unit(s) SubCutaneous three times a day before meals  levothyroxine 50 MICROGram(s) Oral daily  metoprolol succinate ER 12.5 milliGRAM(s) Oral daily  montelukast 10 milliGRAM(s) Oral at bedtime  pantoprazole    Tablet 40 milliGRAM(s) Oral two times a day before meals  polyethylene glycol 3350 17 Gram(s) Oral daily  potassium chloride    Tablet ER 40 milliEquivalent(s) Oral once  senna 2 Tablet(s) Oral at bedtime  sodium bicarbonate  Infusion 0.53 mEq/kG/Hr (200 mL/Hr) IV Continuous <Continuous>  sucralfate suspension 1 Gram(s) Oral four times a day  ticagrelor 180 milliGRAM(s) Oral once      Physical Exam  General: Patient comfortable in bed  Vital Signs Last 12 Hrs  T(F): 97.9 (12-05-17 @ 05:04), Max: 97.9 (12-05-17 @ 05:04)  HR: 75 (12-05-17 @ 05:04) (75 - 75)  BP: 122/59 (12-05-17 @ 05:04) (122/59 - 122/59)  BP(mean): --  RR: 18 (12-05-17 @ 05:04) (18 - 18)  SpO2: 99% (12-05-17 @ 05:04) (99% - 99%)  Neck: No palpable thyroid nodules.  CVS: S1S2, No murmurs  Respiratory: No wheezing, no crepitations  GI: Abdomen soft, bowel sounds positive  Musculoskeletal: Positive edema lower extremities.   Skin: No skin rashes, no ecchymosis    Diagnostics

## 2017-12-06 LAB
BUN SERPL-MCNC: 37 MG/DL — HIGH (ref 7–23)
BUN SERPL-MCNC: 37 MG/DL — HIGH (ref 7–23)
CALCIUM SERPL-MCNC: 9.1 MG/DL — SIGNIFICANT CHANGE UP (ref 8.4–10.5)
CALCIUM SERPL-MCNC: 9.2 MG/DL — SIGNIFICANT CHANGE UP (ref 8.4–10.5)
CHLORIDE SERPL-SCNC: 104 MMOL/L — SIGNIFICANT CHANGE UP (ref 98–107)
CHLORIDE SERPL-SCNC: 105 MMOL/L — SIGNIFICANT CHANGE UP (ref 98–107)
CO2 SERPL-SCNC: 19 MMOL/L — LOW (ref 22–31)
CO2 SERPL-SCNC: 21 MMOL/L — LOW (ref 22–31)
CREAT SERPL-MCNC: 1.54 MG/DL — HIGH (ref 0.5–1.3)
CREAT SERPL-MCNC: 1.68 MG/DL — HIGH (ref 0.5–1.3)
GLUCOSE BLDC GLUCOMTR-MCNC: 260 MG/DL — HIGH (ref 70–99)
GLUCOSE BLDC GLUCOMTR-MCNC: 265 MG/DL — HIGH (ref 70–99)
GLUCOSE BLDC GLUCOMTR-MCNC: 291 MG/DL — HIGH (ref 70–99)
GLUCOSE BLDC GLUCOMTR-MCNC: 82 MG/DL — SIGNIFICANT CHANGE UP (ref 70–99)
GLUCOSE SERPL-MCNC: 316 MG/DL — HIGH (ref 70–99)
GLUCOSE SERPL-MCNC: 90 MG/DL — SIGNIFICANT CHANGE UP (ref 70–99)
HCT VFR BLD CALC: 31.1 % — LOW (ref 34.5–45)
HGB BLD-MCNC: 10.2 G/DL — LOW (ref 11.5–15.5)
MAGNESIUM SERPL-MCNC: 2 MG/DL — SIGNIFICANT CHANGE UP (ref 1.6–2.6)
MCHC RBC-ENTMCNC: 28.2 PG — SIGNIFICANT CHANGE UP (ref 27–34)
MCHC RBC-ENTMCNC: 32.8 % — SIGNIFICANT CHANGE UP (ref 32–36)
MCV RBC AUTO: 85.9 FL — SIGNIFICANT CHANGE UP (ref 80–100)
NRBC # FLD: 0 — SIGNIFICANT CHANGE UP
PHOSPHATE SERPL-MCNC: 2.9 MG/DL — SIGNIFICANT CHANGE UP (ref 2.5–4.5)
PLATELET # BLD AUTO: 348 K/UL — SIGNIFICANT CHANGE UP (ref 150–400)
PMV BLD: 8.8 FL — SIGNIFICANT CHANGE UP (ref 7–13)
POTASSIUM SERPL-MCNC: 2.9 MMOL/L — CRITICAL LOW (ref 3.5–5.3)
POTASSIUM SERPL-MCNC: 3.9 MMOL/L — SIGNIFICANT CHANGE UP (ref 3.5–5.3)
POTASSIUM SERPL-SCNC: 2.9 MMOL/L — CRITICAL LOW (ref 3.5–5.3)
POTASSIUM SERPL-SCNC: 3.9 MMOL/L — SIGNIFICANT CHANGE UP (ref 3.5–5.3)
RBC # BLD: 3.62 M/UL — LOW (ref 3.8–5.2)
RBC # FLD: 13.6 % — SIGNIFICANT CHANGE UP (ref 10.3–14.5)
SODIUM SERPL-SCNC: 140 MMOL/L — SIGNIFICANT CHANGE UP (ref 135–145)
SODIUM SERPL-SCNC: 142 MMOL/L — SIGNIFICANT CHANGE UP (ref 135–145)
WBC # BLD: 11.37 K/UL — HIGH (ref 3.8–10.5)
WBC # FLD AUTO: 11.37 K/UL — HIGH (ref 3.8–10.5)

## 2017-12-06 RX ORDER — POTASSIUM CHLORIDE 20 MEQ
10 PACKET (EA) ORAL ONCE
Qty: 0 | Refills: 0 | Status: COMPLETED | OUTPATIENT
Start: 2017-12-06 | End: 2017-12-06

## 2017-12-06 RX ORDER — POTASSIUM CHLORIDE 20 MEQ
40 PACKET (EA) ORAL EVERY 4 HOURS
Qty: 0 | Refills: 0 | Status: COMPLETED | OUTPATIENT
Start: 2017-12-06 | End: 2017-12-06

## 2017-12-06 RX ADMIN — TICAGRELOR 90 MILLIGRAM(S): 90 TABLET ORAL at 16:55

## 2017-12-06 RX ADMIN — Medication 24 UNIT(S): at 16:46

## 2017-12-06 RX ADMIN — POLYETHYLENE GLYCOL 3350 17 GRAM(S): 17 POWDER, FOR SOLUTION ORAL at 12:50

## 2017-12-06 RX ADMIN — Medication 12.5 MILLIGRAM(S): at 06:15

## 2017-12-06 RX ADMIN — Medication 1 GRAM(S): at 23:29

## 2017-12-06 RX ADMIN — PANTOPRAZOLE SODIUM 40 MILLIGRAM(S): 20 TABLET, DELAYED RELEASE ORAL at 06:15

## 2017-12-06 RX ADMIN — PANTOPRAZOLE SODIUM 40 MILLIGRAM(S): 20 TABLET, DELAYED RELEASE ORAL at 16:46

## 2017-12-06 RX ADMIN — TICAGRELOR 90 MILLIGRAM(S): 90 TABLET ORAL at 06:15

## 2017-12-06 RX ADMIN — Medication 50 MICROGRAM(S): at 06:15

## 2017-12-06 RX ADMIN — Medication 1 GRAM(S): at 06:15

## 2017-12-06 RX ADMIN — Medication 30 MILLILITER(S): at 09:38

## 2017-12-06 RX ADMIN — INSULIN GLARGINE 65 UNIT(S): 100 INJECTION, SOLUTION SUBCUTANEOUS at 22:51

## 2017-12-06 RX ADMIN — SENNA PLUS 2 TABLET(S): 8.6 TABLET ORAL at 21:52

## 2017-12-06 RX ADMIN — Medication 2: at 22:51

## 2017-12-06 RX ADMIN — Medication 1 GRAM(S): at 12:50

## 2017-12-06 RX ADMIN — Medication 24 UNIT(S): at 12:49

## 2017-12-06 RX ADMIN — Medication 6: at 16:45

## 2017-12-06 RX ADMIN — Medication 1200 MILLIGRAM(S): at 01:26

## 2017-12-06 RX ADMIN — Medication 1200 MILLIGRAM(S): at 21:52

## 2017-12-06 RX ADMIN — Medication 1 GRAM(S): at 16:55

## 2017-12-06 RX ADMIN — Medication 100 MILLIEQUIVALENT(S): at 09:26

## 2017-12-06 RX ADMIN — Medication 100 MILLIGRAM(S): at 06:15

## 2017-12-06 RX ADMIN — ATORVASTATIN CALCIUM 40 MILLIGRAM(S): 80 TABLET, FILM COATED ORAL at 21:52

## 2017-12-06 RX ADMIN — Medication 40 MILLIEQUIVALENT(S): at 12:49

## 2017-12-06 RX ADMIN — Medication 6: at 12:49

## 2017-12-06 RX ADMIN — Medication 81 MILLIGRAM(S): at 12:49

## 2017-12-06 RX ADMIN — Medication 100 MILLIGRAM(S): at 12:50

## 2017-12-06 RX ADMIN — Medication 1 GRAM(S): at 01:44

## 2017-12-06 RX ADMIN — Medication 1200 MILLIGRAM(S): at 06:21

## 2017-12-06 RX ADMIN — Medication 40 MILLIEQUIVALENT(S): at 22:51

## 2017-12-06 RX ADMIN — MONTELUKAST 10 MILLIGRAM(S): 4 TABLET, CHEWABLE ORAL at 21:52

## 2017-12-06 RX ADMIN — Medication 100 MILLIGRAM(S): at 21:52

## 2017-12-06 NOTE — PROGRESS NOTE ADULT - SUBJECTIVE AND OBJECTIVE BOX
Chief complaint  Patient is a 72y old  Female who presents with a chief complaint of sob,chest pain (27 Nov 2017 11:05)   Review of systems  Patient in bed, looks comfortable, no fever, no hypoglycemia.    Labs and Fingersticks    CAPILLARY BLOOD GLUCOSE        Calcium, Total Serum: 9.1 (12-06 @ 06:00)  Calcium, Total Serum: 9.3 (12-05 @ 05:56)          12-06    142  |  105  |  37<H>  ----------------------------<  90  2.9<LL>   |  21<L>  |  1.54<H>    Ca    9.1      06 Dec 2017 06:00  Phos  2.9     12-06  Mg     2.0     12-06                          10.2   11.37 )-----------( 348      ( 06 Dec 2017 06:00 )             31.1     Medications  MEDICATIONS  (STANDING):  acetylcysteine  Oral Solution 1200 milliGRAM(s) Oral two times a day  aspirin enteric coated 81 milliGRAM(s) Oral daily  atorvastatin 40 milliGRAM(s) Oral at bedtime  dextrose 5%. 1000 milliLiter(s) (50 mL/Hr) IV Continuous <Continuous>  dextrose 50% Injectable 12.5 Gram(s) IV Push once  dextrose 50% Injectable 25 Gram(s) IV Push once  dextrose 50% Injectable 25 Gram(s) IV Push once  docusate sodium 100 milliGRAM(s) Oral three times a day  insulin glargine Injectable (LANTUS) 65 Unit(s) SubCutaneous at bedtime  insulin lispro (HumaLOG) corrective regimen sliding scale   SubCutaneous three times a day before meals  insulin lispro (HumaLOG) corrective regimen sliding scale   SubCutaneous at bedtime  insulin lispro Injectable (HumaLOG) 24 Unit(s) SubCutaneous three times a day before meals  levothyroxine 50 MICROGram(s) Oral daily  metoprolol succinate ER 12.5 milliGRAM(s) Oral daily  montelukast 10 milliGRAM(s) Oral at bedtime  pantoprazole    Tablet 40 milliGRAM(s) Oral two times a day before meals  polyethylene glycol 3350 17 Gram(s) Oral daily  potassium chloride    Tablet ER 40 milliEquivalent(s) Oral every 4 hours  senna 2 Tablet(s) Oral at bedtime  sodium bicarbonate  Infusion 0.53 mEq/kG/Hr (200 mL/Hr) IV Continuous <Continuous>  sucralfate suspension 1 Gram(s) Oral four times a day  ticagrelor 90 milliGRAM(s) Oral two times a day      Physical Exam  General: Patient comfortable in bed  Vital Signs Last 12 Hrs  T(F): 97.3 (12-06-17 @ 06:06), Max: 97.3 (12-06-17 @ 06:06)  HR: 78 (12-06-17 @ 09:08) (72 - 78)  BP: 108/62 (12-06-17 @ 09:08) (108/62 - 117/68)  BP(mean): --  RR: 18 (12-06-17 @ 09:08) (18 - 18)  SpO2: 100% (12-06-17 @ 09:08) (100% - 100%)  Neck: No palpable thyroid nodules.  CVS: S1S2, No murmurs  Respiratory: No wheezing, no crepitations  GI: Abdomen soft, bowel sounds positive  Musculoskeletal: Positive edema lower extremities.   Skin: No skin rashes, no ecchymosis    Diagnostics

## 2017-12-06 NOTE — CHART NOTE - NSCHARTNOTEFT_GEN_A_CORE
Patient s/p cardiac cath, Rt groin appears clean, dry, intact, no evidence of active bleeding, no hematoma, + pulses. Continue to monitor.

## 2017-12-06 NOTE — CHART NOTE - NSCHARTNOTEFT_GEN_A_CORE
Tele PA Note:    Called by Nurse for patient c/o a slight burning sensation in her chest and for dizziness. Pt is s/p stent to Ramus yesterday and has a 100% in RCA and LAD that is known. Pt will be given Maalox to see if that helps. She states the burning sensation is diminishing from this morning even before the Maalox. Pt was found to have a K of 2.9 that is likely the cause of her dizziness. K is being supplemented. Will follow up closely. FS is 88.    Maurisio Lovett PA-C

## 2017-12-06 NOTE — PROGRESS NOTE ADULT - PROBLEM SELECTOR PLAN 1
- NSTEMI and TTE with new moderate to severe LV dysfxn  - cardiology work up appreciated  - keep on ppi for gi ppx while in a/c  - s/p cardiac cath w/ stent placement  now on brilinta  monitor stools for any evidence of GIB

## 2017-12-06 NOTE — CHART NOTE - NSCHARTNOTEFT_GEN_A_CORE
Called by RN - states pt c/o  dizziness when she stood up,  no CP, no SOB, VSS, no events  on tele, dizziness resolved with rest. Pt appears comfortable NAD, will check orthostatics and monitor closely.

## 2017-12-06 NOTE — PROGRESS NOTE ADULT - ASSESSMENT
Assessment  DMT2: 71y Female with DM T2 with hyperglycemia on insulin, blood sugars improving, no hypoglycemia.  Hypothyroidism:  On synthroid 75 mcg po daily.  HTN: Controlled, On med.  CAD: On medications, monitored.

## 2017-12-06 NOTE — PROGRESS NOTE ADULT - ASSESSMENT
1.	MERVIN, initially sec to cardio-renal syndrome. Renal function was improving with diuresing, then worsened possible sec to hyperglycemia, improving today. patient clinically seemed to be euvolemic, lasix is on hold  2.	CKD stage 3: Baseline Scr 1.5-1.8: at baseline  3.	CHF decompensation:  Follow up cardiology  4.	Metabolic Acidosis: Better  5.	Hyponatremia: sec to hyperglycemia  6.	Hypokalemia: being supplemented.     PLAN:  1.	Lasix on hold. clinically euvolemic right now. f/u cardiology  2.	Follow up renal function and electrolyte  3.	supplement KCl 40meqx 2  4.	stable from renal stand point for D/c if repeat bmp is ok, bmp ordered for 4pm

## 2017-12-06 NOTE — PROGRESS NOTE ADULT - SUBJECTIVE AND OBJECTIVE BOX
INTERVAL HPI/OVERNIGHT EVENTS: Pt seen and examined at bedside, no new changes or complaints      MEDICATIONS  (STANDING):  acetylcysteine  Oral Solution 1200 milliGRAM(s) Oral two times a day  aspirin enteric coated 81 milliGRAM(s) Oral daily  atorvastatin 40 milliGRAM(s) Oral at bedtime  dextrose 5%. 1000 milliLiter(s) (50 mL/Hr) IV Continuous <Continuous>  dextrose 50% Injectable 12.5 Gram(s) IV Push once  dextrose 50% Injectable 25 Gram(s) IV Push once  dextrose 50% Injectable 25 Gram(s) IV Push once  docusate sodium 100 milliGRAM(s) Oral three times a day  insulin glargine Injectable (LANTUS) 65 Unit(s) SubCutaneous at bedtime  insulin lispro (HumaLOG) corrective regimen sliding scale   SubCutaneous three times a day before meals  insulin lispro (HumaLOG) corrective regimen sliding scale   SubCutaneous at bedtime  insulin lispro Injectable (HumaLOG) 24 Unit(s) SubCutaneous three times a day before meals  levothyroxine 50 MICROGram(s) Oral daily  metoprolol succinate ER 12.5 milliGRAM(s) Oral daily  montelukast 10 milliGRAM(s) Oral at bedtime  pantoprazole    Tablet 40 milliGRAM(s) Oral two times a day before meals  polyethylene glycol 3350 17 Gram(s) Oral daily  potassium chloride    Tablet ER 40 milliEquivalent(s) Oral every 4 hours  senna 2 Tablet(s) Oral at bedtime  sodium bicarbonate  Infusion 0.53 mEq/kG/Hr (200 mL/Hr) IV Continuous <Continuous>  sucralfate suspension 1 Gram(s) Oral four times a day  ticagrelor 90 milliGRAM(s) Oral two times a day    MEDICATIONS  (PRN):  aluminum hydroxide/magnesium hydroxide/simethicone Suspension 30 milliLiter(s) Oral every 4 hours PRN Dyspepsia  dextrose Gel 1 Dose(s) Oral once PRN Blood Glucose LESS THAN 70 milliGRAM(s)/deciliter  glucagon  Injectable 1 milliGRAM(s) IntraMuscular once PRN Glucose LESS THAN 70 milligrams/deciliter  glucagon  Injectable 1 milliGRAM(s) IntraMuscular once PRN Glucose LESS THAN 70 milligrams/deciliter      Allergies    No Known Allergies    Intolerances        ROS:   General:  No wt loss, fevers, chills, night sweats, fatigue,   Eyes:  Good vision, no reported pain  ENT:  No sore throat, pain, runny nose, dysphagia  CV:  No pain, palpitations, hypo/hypertension  Resp:  No dyspnea, cough, tachypnea, wheezing  GI:  No pain, No nausea, No vomiting, No diarrhea, No constipation, No weight loss, No fever, No pruritis, No rectal bleeding, No tarry stools, No dysphagia,  :  No pain, bleeding, incontinence, nocturia  Muscle:  No pain, weakness  Neuro:  No weakness, tingling, memory problems  Psych:  No fatigue, insomnia, mood problems, depression  Endocrine:  No polyuria, polydipsia, cold/heat intolerance  Heme:  No petechiae, ecchymosis, easy bruisability  Skin:  No rash, tattoos, scars, edema      PHYSICAL EXAM:   Vital Signs:  Vital Signs Last 24 Hrs  T(C): 36.3 (06 Dec 2017 06:06), Max: 36.8 (05 Dec 2017 18:50)  T(F): 97.3 (06 Dec 2017 06:06), Max: 98.3 (05 Dec 2017 18:50)  HR: 78 (06 Dec 2017 09:08) (72 - 85)  BP: 108/62 (06 Dec 2017 09:08) (108/62 - 133/59)  BP(mean): --  RR: 18 (06 Dec 2017 09:08) (16 - 18)  SpO2: 100% (06 Dec 2017 09:08) (99% - 100%)  Daily     Daily Weight in k.3 (06 Dec 2017 06:23)    GENERAL:  Appears stated age, well-groomed, well-nourished, no distress  HEENT:  NC/AT,  conjunctivae clear and pink, no thyromegaly, nodules, adenopathy, no JVD, sclera -anicteric  CHEST:  Full & symmetric excursion, no increased effort, breath sounds clear  HEART:  Regular rhythm, S1, S2, no murmur/rub/S3/S4, no abdominal bruit, no edema  ABDOMEN:  Soft, non-tender, non-distended, normoactive bowel sounds,  no masses ,no hepato-splenomegaly, no signs of chronic liver disease  EXTEREMITIES:  no cyanosis,clubbing or edema  SKIN:  No rash/erythema/ecchymoses/petechiae/wounds/abscess/warm/dry  NEURO:  Alert, oriented, no asterixis, no tremor, no encephalopathy      LABS:                        10.2   11.37 )-----------( 348      ( 06 Dec 2017 06:00 )             31.1     12    142  |  105  |  37<H>  ----------------------------<  90  2.9<LL>   |  21<L>  |  1.54<H>    Ca    9.1      06 Dec 2017 06:00  Phos  2.9     12  Mg     2.0                 RADIOLOGY & ADDITIONAL TESTS:

## 2017-12-06 NOTE — PROGRESS NOTE ADULT - SUBJECTIVE AND OBJECTIVE BOX
Subjective: No CP, c/o SOB    MEDICATIONS  (STANDING):  acetylcysteine  Oral Solution 1200 milliGRAM(s) Oral two times a day  aspirin enteric coated 81 milliGRAM(s) Oral daily  atorvastatin 40 milliGRAM(s) Oral at bedtime  docusate sodium 100 milliGRAM(s) Oral three times a day  insulin glargine Injectable (LANTUS) 65 Unit(s) SubCutaneous at bedtime  insulin lispro (HumaLOG) corrective regimen sliding scale   SubCutaneous three times a day before meals  insulin lispro (HumaLOG) corrective regimen sliding scale   SubCutaneous at bedtime  insulin lispro Injectable (HumaLOG) 24 Unit(s) SubCutaneous three times a day before meals  levothyroxine 50 MICROGram(s) Oral daily  metoprolol succinate ER 12.5 milliGRAM(s) Oral daily  montelukast 10 milliGRAM(s) Oral at bedtime  pantoprazole    Tablet 40 milliGRAM(s) Oral two times a day before meals  polyethylene glycol 3350 17 Gram(s) Oral daily  potassium chloride    Tablet ER 40 milliEquivalent(s) Oral every 4 hours  senna 2 Tablet(s) Oral at bedtime  sodium bicarbonate  Infusion 0.53 mEq/kG/Hr (200 mL/Hr) IV Continuous <Continuous>  sucralfate suspension 1 Gram(s) Oral four times a day  ticagrelor 90 milliGRAM(s) Oral two times a day      LABS:                        10.2   11.37 )-----------( 348      ( 06 Dec 2017 06:00 )             31.1     142  |  105  |  37<H>  ----------------------------<  90  2.9<LL>   |  21<L>  |  1.54<H>    Ca    9.1      06 Dec 2017 06:00  Phos  2.9     12-06  Mg     2.0     12-06    Creatinine Trend: 1.54<--, 1.81<--, 1.94<--, 2.25<--, 2.01<--, 2.00<--     PHYSICAL EXAM  Vital Signs Last 24 Hrs  T(C): 36.4 (06 Dec 2017 13:50), Max: 36.8 (05 Dec 2017 18:50)  T(F): 97.6 (06 Dec 2017 13:50), Max: 98.3 (05 Dec 2017 18:50)  HR: 88 (06 Dec 2017 13:50) (72 - 88)  BP: 124/71 (06 Dec 2017 13:50) (108/62 - 133/59)  RR: 18 (06 Dec 2017 13:50) (16 - 18)  SpO2: 100% (06 Dec 2017 13:50) (99% - 100%)    Cardiovascular: Normal S1 S2,     No JVD, 1/6 YUSUF murmur, Peripheral pulses palpable 2+ bilaterally  Respiratory: Bibasilar crackles	  Gastrointestinal:  Soft, Non-tender, + BS	  Extremities no edema, cyanosis, clubbing B/L LE's     DIAGNOSTIC TESTING:    < from: Cardiac Cath Lab - Adult (10.13.17 @ 10:40) >  VENTRICLES: No left ventriculogram was performed.  CORONARY VESSELS: The coronary circulation is right dominant.  LM:   --  LM: Normal.  LAD:   --  Proximal LAD: There was a diffuse 60 % stenosis.  --  Mid LAD: There was a 100 % stenosis with the distal vessel being filled  via LIMA-LAD.  CX:   --  Circumflex: The vessel was small sized. Angiography showed mild  atherosclerosis withno flow limiting lesions.  --  OM1: Angiography showed mild atherosclerosis with no flow limiting  lesions.  RI:   --  Ostial ramus intermedius: There was a tubular 80 % stenosis.  There was PARUL grade 3 flow through the vessel (brisk flow).  RCA:   --  Proximal RCA: Angiography showed mild atherosclerosis with no  flow limiting lesions.  --  Mid RCA: There was a 100 % stenosis with the distal vessel being filled  via collaterals. This lesion is a chronic total occlusion.  GRAFTS:   --  Graft to the LAD: The graft was a LIMA. Graft angiography  showed minor luminal irregularities. Distal vessel angiography showed  minor luminal irregularities.  AORTA: Ascending aorta: Normal. No additional grafts visualized in  aortogram.  COMPLICATIONS: There were no complications.  DIAGNOSTIC RECOMMENDATIONS: Coronary angiogram demonstrates patent  LIMA-LAD, closed SVG grafts, and a severe stenosis in the ostial Ramus.  Will perform staged PCI to RI when Cr stable and renally optimized.  Prepared and signed by  Corie Ga M.D.  Signed 10/17/2017 13:49:15    < end of copied text >    < from: Cardiac Cath Lab - Adult (11.17.17 @ 11:48) >  PROCEDURE:  --  Intervention on ramus intermedius: drug-eluting stent.    VENTRICLES: No LV gram was performed; however, a recent echocardiogram  demonstrated normal global and regional LV function.  CORONARY VESSELS: The coronary circulation is right dominant.  LM:   --  LM: Angiography showed minor luminal irregularities with no flow  limiting lesions.  LAD:   --  Ostial LAD: There was a 100 % stenosis.  CX:   --  Circumflex: This vessel was not injected, but was visualized  during a prior cardiac catheterization.  RI:   --  Ramus intermedius: There was a 95 % stenosis.  RCA:   --  RCA: This vessel was not injected.  COMPLICATIONS: There were no complications.  DIAGNOSTIC RECOMMENDATIONS: The patient should continue with the present  medications. will add plavix 75mg po once a day/ will add ECASA 325mg po  once day.  Prepared and signed by  Roberta Quick M.D.  Signed 11/17/2017 13:16:01    < end of copied text >    < from: TTE with Doppler (w/Cont) (11.25.17 @ 12:33) >  CONCLUSIONS:  1. Mitral annular calcification, otherwise normal mitral  valve. Mild mitral regurgitation.  2. Normal left ventricular internal dimensions and wall  thicknesses.  3. Endocardium not well visualized; grossly moderate to  severe segmental left ventricularsystolic dysfunction.  Hypokinesis of the inferior, lateral, and inferolateral  walls.  Endocardial visualization enhanced with intravenous  injection of echo contrast (Definity).  No LV thrombus  seen.  4. The right ventricle is not well visualized;grossly  normal right ventricular systolic function.  ------------------------------------------------------------------------  Confirmed on  11/25/2017 - 14:44:41 by Jose Lucia M.D.    < end of copied text >    < from: CT Chest No Cont (12.01.17 @ 17:38) >  IMPRESSION:   No acute pulmonary disease.    JENA STEARNS D.O., RADIOLOGY RESIDENT  This document has been electronically signed.  MEAGAN SUMMERS M.D., ATTENDING RADIOLOGIST  This document has been electronically signed. Dec  2 2017 10:29AM    < end of copied text >      ASSESSMENT/PLAN: 	  71 yr old  Female w/ PMH HTN, CKD, Hyperlipidemia, DM-II, CAD s/p 3V CABG  (LIMA to LAD, SVG to OM, SVG to PDA) at [facility] 2014, s/P CORNELIA TO RI 11/17/17 presenting with shortness of breath, cough, Hypoxic in ED with O2 sats in the 70s, fevers, Leukocytosis and elevated lactate.  CXR showed pulmonary edema in ED. EKG with new intraventricular block and STD V3,V4,V5, CE elevated c/w NSTEMI    -- CT Chest 12/1 no acute pulmonary disease   --Pulm edema/acute decomp systolic HF, now compensated, cont PO Lasix (per Renal)  --CAD s/p CORNELIA to RI 11/17/17. Cont ASA, Plavix.   --NSTEMI and TTE with new moderate to severe LV dysfxn --s/p LHC on 12/5 s/p CORNELIA to dRamus  --Replete K  --Check CXR to eval volume status    Juliane Fitch PA-C  [facility]  [address], [address]   [place] [address]  office [phone]  pager [phone]

## 2017-12-07 LAB
BUN SERPL-MCNC: 33 MG/DL — HIGH (ref 7–23)
CALCIUM SERPL-MCNC: 9.3 MG/DL — SIGNIFICANT CHANGE UP (ref 8.4–10.5)
CHLORIDE SERPL-SCNC: 112 MMOL/L — HIGH (ref 98–107)
CK MB BLD-MCNC: 5.26 NG/ML — HIGH (ref 1–4.7)
CK MB BLD-MCNC: SIGNIFICANT CHANGE UP (ref 0–2.5)
CK SERPL-CCNC: 71 U/L — SIGNIFICANT CHANGE UP (ref 25–170)
CO2 SERPL-SCNC: 22 MMOL/L — SIGNIFICANT CHANGE UP (ref 22–31)
CREAT SERPL-MCNC: 1.63 MG/DL — HIGH (ref 0.5–1.3)
GLUCOSE BLDC GLUCOMTR-MCNC: 166 MG/DL — HIGH (ref 70–99)
GLUCOSE BLDC GLUCOMTR-MCNC: 207 MG/DL — HIGH (ref 70–99)
GLUCOSE BLDC GLUCOMTR-MCNC: 222 MG/DL — HIGH (ref 70–99)
GLUCOSE BLDC GLUCOMTR-MCNC: 256 MG/DL — HIGH (ref 70–99)
GLUCOSE SERPL-MCNC: 157 MG/DL — HIGH (ref 70–99)
HCT VFR BLD CALC: 31.5 % — LOW (ref 34.5–45)
HGB BLD-MCNC: 9.8 G/DL — LOW (ref 11.5–15.5)
MCHC RBC-ENTMCNC: 27.9 PG — SIGNIFICANT CHANGE UP (ref 27–34)
MCHC RBC-ENTMCNC: 31.1 % — LOW (ref 32–36)
MCV RBC AUTO: 89.7 FL — SIGNIFICANT CHANGE UP (ref 80–100)
NRBC # FLD: 0 — SIGNIFICANT CHANGE UP
PLATELET # BLD AUTO: 332 K/UL — SIGNIFICANT CHANGE UP (ref 150–400)
PMV BLD: 8.9 FL — SIGNIFICANT CHANGE UP (ref 7–13)
POTASSIUM SERPL-MCNC: 4 MMOL/L — SIGNIFICANT CHANGE UP (ref 3.5–5.3)
POTASSIUM SERPL-SCNC: 4 MMOL/L — SIGNIFICANT CHANGE UP (ref 3.5–5.3)
RBC # BLD: 3.51 M/UL — LOW (ref 3.8–5.2)
RBC # FLD: 13.6 % — SIGNIFICANT CHANGE UP (ref 10.3–14.5)
SODIUM SERPL-SCNC: 148 MMOL/L — HIGH (ref 135–145)
TROPONIN T SERPL-MCNC: 0.41 NG/ML — HIGH (ref 0–0.06)
WBC # BLD: 9.77 K/UL — SIGNIFICANT CHANGE UP (ref 3.8–10.5)
WBC # FLD AUTO: 9.77 K/UL — SIGNIFICANT CHANGE UP (ref 3.8–10.5)

## 2017-12-07 PROCEDURE — 71020: CPT | Mod: 26

## 2017-12-07 RX ORDER — FUROSEMIDE 40 MG
40 TABLET ORAL ONCE
Qty: 0 | Refills: 0 | Status: COMPLETED | OUTPATIENT
Start: 2017-12-07 | End: 2017-12-07

## 2017-12-07 RX ADMIN — Medication 1 GRAM(S): at 18:22

## 2017-12-07 RX ADMIN — Medication 24 UNIT(S): at 12:39

## 2017-12-07 RX ADMIN — Medication 24 UNIT(S): at 08:58

## 2017-12-07 RX ADMIN — Medication 2: at 22:07

## 2017-12-07 RX ADMIN — Medication 50 MICROGRAM(S): at 06:30

## 2017-12-07 RX ADMIN — Medication 81 MILLIGRAM(S): at 12:32

## 2017-12-07 RX ADMIN — Medication 40 MILLIGRAM(S): at 12:40

## 2017-12-07 RX ADMIN — Medication 24 UNIT(S): at 17:17

## 2017-12-07 RX ADMIN — TICAGRELOR 90 MILLIGRAM(S): 90 TABLET ORAL at 06:29

## 2017-12-07 RX ADMIN — PANTOPRAZOLE SODIUM 40 MILLIGRAM(S): 20 TABLET, DELAYED RELEASE ORAL at 06:29

## 2017-12-07 RX ADMIN — POLYETHYLENE GLYCOL 3350 17 GRAM(S): 17 POWDER, FOR SOLUTION ORAL at 12:13

## 2017-12-07 RX ADMIN — Medication 2: at 17:15

## 2017-12-07 RX ADMIN — INSULIN GLARGINE 65 UNIT(S): 100 INJECTION, SOLUTION SUBCUTANEOUS at 22:06

## 2017-12-07 RX ADMIN — Medication 100 MILLIGRAM(S): at 22:07

## 2017-12-07 RX ADMIN — Medication 12.5 MILLIGRAM(S): at 06:30

## 2017-12-07 RX ADMIN — ATORVASTATIN CALCIUM 40 MILLIGRAM(S): 80 TABLET, FILM COATED ORAL at 22:07

## 2017-12-07 RX ADMIN — MONTELUKAST 10 MILLIGRAM(S): 4 TABLET, CHEWABLE ORAL at 22:08

## 2017-12-07 RX ADMIN — SENNA PLUS 2 TABLET(S): 8.6 TABLET ORAL at 22:07

## 2017-12-07 RX ADMIN — Medication 2: at 08:57

## 2017-12-07 RX ADMIN — Medication 4: at 12:32

## 2017-12-07 RX ADMIN — PANTOPRAZOLE SODIUM 40 MILLIGRAM(S): 20 TABLET, DELAYED RELEASE ORAL at 17:15

## 2017-12-07 RX ADMIN — Medication 1 GRAM(S): at 06:30

## 2017-12-07 RX ADMIN — Medication 1 GRAM(S): at 12:32

## 2017-12-07 RX ADMIN — Medication 100 MILLIGRAM(S): at 06:29

## 2017-12-07 RX ADMIN — TICAGRELOR 90 MILLIGRAM(S): 90 TABLET ORAL at 18:22

## 2017-12-07 NOTE — PROGRESS NOTE ADULT - SUBJECTIVE AND OBJECTIVE BOX
INTERVAL HPI/OVERNIGHT EVENTS:  Pt seen and examined at bedside, SOB, but no abdominal pain    MEDICATIONS  (STANDING):  aspirin enteric coated 81 milliGRAM(s) Oral daily  atorvastatin 40 milliGRAM(s) Oral at bedtime  dextrose 5%. 1000 milliLiter(s) (50 mL/Hr) IV Continuous <Continuous>  dextrose 50% Injectable 12.5 Gram(s) IV Push once  dextrose 50% Injectable 25 Gram(s) IV Push once  dextrose 50% Injectable 25 Gram(s) IV Push once  docusate sodium 100 milliGRAM(s) Oral three times a day  insulin glargine Injectable (LANTUS) 65 Unit(s) SubCutaneous at bedtime  insulin lispro (HumaLOG) corrective regimen sliding scale   SubCutaneous three times a day before meals  insulin lispro (HumaLOG) corrective regimen sliding scale   SubCutaneous at bedtime  insulin lispro Injectable (HumaLOG) 24 Unit(s) SubCutaneous three times a day before meals  levothyroxine 50 MICROGram(s) Oral daily  metoprolol succinate ER 12.5 milliGRAM(s) Oral daily  montelukast 10 milliGRAM(s) Oral at bedtime  pantoprazole    Tablet 40 milliGRAM(s) Oral two times a day before meals  polyethylene glycol 3350 17 Gram(s) Oral daily  senna 2 Tablet(s) Oral at bedtime  sucralfate suspension 1 Gram(s) Oral four times a day  ticagrelor 90 milliGRAM(s) Oral two times a day    MEDICATIONS  (PRN):  aluminum hydroxide/magnesium hydroxide/simethicone Suspension 30 milliLiter(s) Oral every 4 hours PRN Dyspepsia  dextrose Gel 1 Dose(s) Oral once PRN Blood Glucose LESS THAN 70 milliGRAM(s)/deciliter  glucagon  Injectable 1 milliGRAM(s) IntraMuscular once PRN Glucose LESS THAN 70 milligrams/deciliter  glucagon  Injectable 1 milliGRAM(s) IntraMuscular once PRN Glucose LESS THAN 70 milligrams/deciliter      Allergies    No Known Allergies    Intolerances        ROS:   General:  No wt loss, fevers, chills, night sweats, fatigue,   Eyes:  Good vision, no reported pain  ENT:  No sore throat, pain, runny nose, dysphagia  CV:  No pain, palpitations, hypo/hypertension  Resp:  No dyspnea, cough, tachypnea, wheezing  GI:  No pain, No nausea, No vomiting, No diarrhea, No constipation, No weight loss, No fever, No pruritis, No rectal bleeding, No tarry stools, No dysphagia,  :  No pain, bleeding, incontinence, nocturia  Muscle:  No pain, weakness  Neuro:  No weakness, tingling, memory problems  Psych:  No fatigue, insomnia, mood problems, depression  Endocrine:  No polyuria, polydipsia, cold/heat intolerance  Heme:  No petechiae, ecchymosis, easy bruisability  Skin:  No rash, tattoos, scars, edema      PHYSICAL EXAM:   Vital Signs:  Vital Signs Last 24 Hrs  T(C): 36.6 (07 Dec 2017 12:41), Max: 36.6 (07 Dec 2017 12:41)  T(F): 97.8 (07 Dec 2017 12:41), Max: 97.8 (07 Dec 2017 12:41)  HR: 78 (07 Dec 2017 12:41) (78 - 81)  BP: 118/56 (07 Dec 2017 12:41) (114/65 - 122/69)  BP(mean): --  RR: 18 (07 Dec 2017 12:41) (18 - 18)  SpO2: 100% (07 Dec 2017 12:41) (100% - 100%)  Daily     Daily Weight in k.2 (07 Dec 2017 06:25)    GENERAL:  Appears stated age, well-groomed, well-nourished, no distress  HEENT:  NC/AT,  conjunctivae clear and pink, no thyromegaly, nodules, adenopathy, no JVD, sclera -anicteric  CHEST:  Full & symmetric excursion, no increased effort, breath sounds clear  HEART:  Regular rhythm, S1, S2, no murmur/rub/S3/S4, no abdominal bruit, no edema  ABDOMEN:  Soft, non-tender, non-distended, normoactive bowel sounds,  no masses ,no hepato-splenomegaly, no signs of chronic liver disease  EXTEREMITIES:  no cyanosis,clubbing or edema  SKIN:  No rash/erythema/ecchymoses/petechiae/wounds/abscess/warm/dry  NEURO:  Alert, oriented, no asterixis, no tremor, no encephalopathy      LABS:                        9.8    9.77  )-----------( 332      ( 07 Dec 2017 06:15 )             31.5     12-07    148<H>  |  112<H>  |  33<H>  ----------------------------<  157<H>  4.0   |  22  |  1.63<H>    Ca    9.3      07 Dec 2017 06:15  Phos  2.9     12-06  Mg     2.0     12-06            RADIOLOGY & ADDITIONAL TESTS:

## 2017-12-07 NOTE — PROGRESS NOTE ADULT - ASSESSMENT
1.	MERVIN, initially sec to cardio-renal syndrome. Renal function was improving with diuresing, then worsened possible sec to hyperglycemia, improving today. CKD stage 3: Baseline Scr 1.5-1.8: at baseline  2.	CHF decompensation:  Follow up cardiology  3.	Metabolic Acidosis: Better  4.	Hyponatremia: now hypernatremia  5.	Hypokalemia: being supplemented.     PLAN:  1.	Lasix 40mg IV daily started today, monitor bmp, f/u cardio  2.	Follow up renal function and electrolyte  3.	increase free water, continue diuresing per cardio

## 2017-12-07 NOTE — PROGRESS NOTE ADULT - SUBJECTIVE AND OBJECTIVE BOX
Subjective: No CP, c/o SOB    MEDICATIONS  (STANDING):  aspirin enteric coated 81 milliGRAM(s) Oral daily  atorvastatin 40 milliGRAM(s) Oral at bedtime  docusate sodium 100 milliGRAM(s) Oral three times a day  furosemide   Injectable 40 milliGRAM(s) IV Push once  insulin glargine Injectable (LANTUS) 65 Unit(s) SubCutaneous at bedtime  insulin lispro (HumaLOG) corrective regimen sliding scale   SubCutaneous three times a day before meals  insulin lispro (HumaLOG) corrective regimen sliding scale   SubCutaneous at bedtime  insulin lispro Injectable (HumaLOG) 24 Unit(s) SubCutaneous three times a day before meals  levothyroxine 50 MICROGram(s) Oral daily  metoprolol succinate ER 12.5 milliGRAM(s) Oral daily  montelukast 10 milliGRAM(s) Oral at bedtime  pantoprazole    Tablet 40 milliGRAM(s) Oral two times a day before meals  polyethylene glycol 3350 17 Gram(s) Oral daily  senna 2 Tablet(s) Oral at bedtime  sucralfate suspension 1 Gram(s) Oral four times a day  ticagrelor 90 milliGRAM(s) Oral two times a day    LABS:                        9.8    9.77  )-----------( 332      ( 07 Dec 2017 06:15 )             31.5       148<H>  |  112<H>  |  33<H>  ----------------------------<  157<H>  4.0   |  22  |  1.63<H>    Ca    9.3      07 Dec 2017 06:15  Phos  2.9     12-06  Mg     2.0     12-06    Creatinine Trend: 1.63<--, 1.68<--, 1.54<--, 1.81<--, 1.94<--, 2.25<--     CARDIAC MARKERS ( 07 Dec 2017 06:15 )  x     / 0.41 ng/mL / 71 u/L / 5.26 ng/mL / x        PHYSICAL EXAM  Vital Signs Last 24 Hrs  T(C): 36.4 (07 Dec 2017 06:25), Max: 36.4 (06 Dec 2017 13:50)  T(F): 97.6 (07 Dec 2017 06:25), Max: 97.6 (06 Dec 2017 13:50)  HR: 79 (07 Dec 2017 06:25) (79 - 88)  BP: 114/65 (07 Dec 2017 06:25) (114/65 - 124/71)  RR: 18 (07 Dec 2017 06:25) (18 - 18)  SpO2: 100% (07 Dec 2017 06:25) (100% - 100%)      Cardiovascular: Normal S1 S2,     No JVD, 1/6 YUSUF murmur, Peripheral pulses palpable 2+ bilaterally  Respiratory: Bibasilar crackles	  Gastrointestinal:  Soft, Non-tender, + BS	  Extremities no edema, cyanosis, clubbing B/L LE's     DIAGNOSTIC TESTING:    < from: Cardiac Cath Lab - Adult (10.13.17 @ 10:40) >  VENTRICLES: No left ventriculogram was performed.  CORONARY VESSELS: The coronary circulation is right dominant.  LM:   --  LM: Normal.  LAD:   --  Proximal LAD: There was a diffuse 60 % stenosis.  --  Mid LAD: There was a 100 % stenosis with the distal vessel being filled  via LIMA-LAD.  CX:   --  Circumflex: The vessel was small sized. Angiography showed mild  atherosclerosis withno flow limiting lesions.  --  OM1: Angiography showed mild atherosclerosis with no flow limiting  lesions.  RI:   --  Ostial ramus intermedius: There was a tubular 80 % stenosis.  There was PARUL grade 3 flow through the vessel (brisk flow).  RCA:   --  Proximal RCA: Angiography showed mild atherosclerosis with no  flow limiting lesions.  --  Mid RCA: There was a 100 % stenosis with the distal vessel being filled  via collaterals. This lesion is a chronic total occlusion.  GRAFTS:   --  Graft to the LAD: The graft was a LIMA. Graft angiography  showed minor luminal irregularities. Distal vessel angiography showed  minor luminal irregularities.  AORTA: Ascending aorta: Normal. No additional grafts visualized in  aortogram.  COMPLICATIONS: There were no complications.  DIAGNOSTIC RECOMMENDATIONS: Coronary angiogram demonstrates patent  LIMA-LAD, closed SVG grafts, and a severe stenosis in the ostial Ramus.  Will perform staged PCI to RI when Cr stable and renally optimized.  Prepared and signed by  Corie Ga M.D.  Signed 10/17/2017 13:49:15    < end of copied text >    < from: Cardiac Cath Lab - Adult (11.17.17 @ 11:48) >  PROCEDURE:  --  Intervention on ramus intermedius: drug-eluting stent.    VENTRICLES: No LV gram was performed; however, a recent echocardiogram  demonstrated normal global and regional LV function.  CORONARY VESSELS: The coronary circulation is right dominant.  LM:   --  LM: Angiography showed minor luminal irregularities with no flow  limiting lesions.  LAD:   --  Ostial LAD: There was a 100 % stenosis.  CX:   --  Circumflex: This vessel was not injected, but was visualized  during a prior cardiac catheterization.  RI:   --  Ramus intermedius: There was a 95 % stenosis.  RCA:   --  RCA: This vessel was not injected.  COMPLICATIONS: There were no complications.  DIAGNOSTIC RECOMMENDATIONS: The patient should continue with the present  medications. will add plavix 75mg po once a day/ will add ECASA 325mg po  once day.  Prepared and signed by  Roberta Quick M.D.  Signed 11/17/2017 13:16:01    < end of copied text >    < from: TTE with Doppler (w/Cont) (11.25.17 @ 12:33) >  CONCLUSIONS:  1. Mitral annular calcification, otherwise normal mitral  valve. Mild mitral regurgitation.  2. Normal left ventricular internal dimensions and wall  thicknesses.  3. Endocardium not well visualized; grossly moderate to  severe segmental left ventricularsystolic dysfunction.  Hypokinesis of the inferior, lateral, and inferolateral  walls.  Endocardial visualization enhanced with intravenous  injection of echo contrast (Definity).  No LV thrombus  seen.  4. The right ventricle is not well visualized;grossly  normal right ventricular systolic function.  ------------------------------------------------------------------------  Confirmed on  11/25/2017 - 14:44:41 by Jose Lucai M.D.    < end of copied text >    < from: CT Chest No Cont (12.01.17 @ 17:38) >  IMPRESSION:   No acute pulmonary disease.    JENA STEARNS D.O., RADIOLOGY RESIDENT  This document has been electronically signed.  MEAGAN SUMMERS M.D., ATTENDING RADIOLOGIST  This document has been electronically signed. Dec  2 2017 10:29AM    < end of copied text >    < from: Cardiac Cath Lab - Adult (12.05.17 @ 12:48) >  VENTRICLES: No LV gram was performed; however, a recent echocardiogram  demonstrated an EF of 36 %.  CORONARY VESSELS: The coronary circulation is right dominant.  LM:   --  Distal left main: Angiography showed minor luminal irregularities  with no flow limiting lesions.  LAD:   --  Mid LAD: There was a 100 % stenosis with the distal vessel being  filled via LIMA-LAD.  CX:   --  Proximal circumflex: There was a tubular 20 % stenosis.  --  Mid circumflex: Angiography showed minor luminal irregularities with no  flow limiting lesions.  --  Distal circumflex: Angiography showed minor luminal irregularities with  no flow limiting lesions.  --  OM1: The vessel was small sized. There was a discrete 60 % stenosis.  RI:   --  Ostial ramus intermedius: Angiography showed minor luminal  irregularities with no flow limiting lesions. There was no significant  restenosis in RI stent.  --  Proximal ramus intermedius: Angiography showed minor luminal  irregularities with no flow limiting lesions.  --  Mid ramus intermedius: There was a tubular 80 % stenosis. The lesion  was associated with a small filling defect consistent with thrombus.  RCA:   --  Mid RCA: There was a 100 % stenosis with the distal vessel being  filled via collaterals from the LAD.  GRAFTS:   --  Graft to the LAD: The graft was a LIMA. Graft angiography  showed minor luminal irregularities. Distal vessel angiography showed  minor luminalirregularities.  COMPLICATIONS: There were no complications.  DIAGNOSTIC RECOMMENDATIONS: Coronary angiogram demonstrates a severe  stenosis in the ramus intermedius that is the cause of the patient's  clinical presentation. Will therefore perform PCI to the RI.  INTERVENTIONAL RECOMMENDATIONS: S/p successful CORNELIA to the Ramus  Intermedius. The patient should continue with ASA and Brilinta for at  least 12 months.  Prepared and signed by  Corie Ga M.D.  Signed 12/06/2017 17:06:30    < end of copied text >      ASSESSMENT/PLAN: 	  71 yr old  Female w/ PMH HTN, CKD, Hyperlipidemia, DM-II, CAD s/p 3V CABG  (LIMA to LAD, SVG to OM, SVG to PDA) at [facility] 2014, s/P CORNELIA TO RI 11/17/17 presenting with shortness of breath, cough, Hypoxic in ED with O2 sats in the 70s, fevers, Leukocytosis and elevated lactate.  CXR showed pulmonary edema in ED. EKG with new intraventricular block and STD V3,V4,V5, CE elevated c/w NSTEMI    --Pulm edema/acute decomp systolic HF, s/p diuresis, now compensated  --NSTEMI and TTE with new moderate to severe LV dysfxn --s/p LHC on 12/5 s/p CORNELIA to dRamus  --Check CXR to eval volume status today, suspect SOB due to Volume overload  --D/W Renal--will give Lasix 40 IV x 1  --Anticipate resume PO Lasix in next 24 hours    Juliane Fitch PA-C  [facility]  [address], [address]   [place] [address]  office [phone]  pager [phone]

## 2017-12-07 NOTE — PROGRESS NOTE ADULT - SUBJECTIVE AND OBJECTIVE BOX
Chief complaint  Patient is a 72y old  Female who presents with a chief complaint of sob,chest pain (27 Nov 2017 11:05)   Review of systems  Patient in bed, looks comfortable, no fever, no hypoglycemia.    Labs and Fingersticks    CAPILLARY BLOOD GLUCOSE        Calcium, Total Serum: 9.3 (12-07 @ 06:15)  Calcium, Total Serum: 9.2 (12-06 @ 19:40)  Calcium, Total Serum: 9.1 (12-06 @ 06:00)          12-07    148<H>  |  112<H>  |  33<H>  ----------------------------<  157<H>  4.0   |  22  |  1.63<H>    Ca    9.3      07 Dec 2017 06:15  Phos  2.9     12-06  Mg     2.0     12-06                          9.8    9.77  )-----------( 332      ( 07 Dec 2017 06:15 )             31.5     Medications  MEDICATIONS  (STANDING):  aspirin enteric coated 81 milliGRAM(s) Oral daily  atorvastatin 40 milliGRAM(s) Oral at bedtime  dextrose 5%. 1000 milliLiter(s) (50 mL/Hr) IV Continuous <Continuous>  dextrose 50% Injectable 12.5 Gram(s) IV Push once  dextrose 50% Injectable 25 Gram(s) IV Push once  dextrose 50% Injectable 25 Gram(s) IV Push once  docusate sodium 100 milliGRAM(s) Oral three times a day  insulin glargine Injectable (LANTUS) 65 Unit(s) SubCutaneous at bedtime  insulin lispro (HumaLOG) corrective regimen sliding scale   SubCutaneous three times a day before meals  insulin lispro (HumaLOG) corrective regimen sliding scale   SubCutaneous at bedtime  insulin lispro Injectable (HumaLOG) 24 Unit(s) SubCutaneous three times a day before meals  levothyroxine 50 MICROGram(s) Oral daily  metoprolol succinate ER 12.5 milliGRAM(s) Oral daily  montelukast 10 milliGRAM(s) Oral at bedtime  pantoprazole    Tablet 40 milliGRAM(s) Oral two times a day before meals  polyethylene glycol 3350 17 Gram(s) Oral daily  senna 2 Tablet(s) Oral at bedtime  sucralfate suspension 1 Gram(s) Oral four times a day  ticagrelor 90 milliGRAM(s) Oral two times a day      Physical Exam  General: Patient comfortable in bed  Vital Signs Last 12 Hrs  T(F): 97.9 (12-07-17 @ 14:20), Max: 97.9 (12-07-17 @ 14:20)  HR: 87 (12-07-17 @ 14:20) (78 - 87)  BP: 112/63 (12-07-17 @ 14:20) (112/63 - 118/56)  BP(mean): --  RR: 18 (12-07-17 @ 14:20) (18 - 18)  SpO2: 100% (12-07-17 @ 14:20) (100% - 100%)  Neck: No palpable thyroid nodules.  CVS: S1S2, No murmurs  Respiratory: No wheezing, no crepitations  GI: Abdomen soft, bowel sounds positive  Musculoskeletal: Positive edema lower extremities.   Skin: No skin rashes, no ecchymosis    Diagnostics

## 2017-12-07 NOTE — PROGRESS NOTE ADULT - SUBJECTIVE AND OBJECTIVE BOX
Dr. Muhammad (Nephrology)  Office [phone]  Cell [phone]  [facility]  Cell [phone]      Patient is a 72y old  Female who presents with a chief complaint of sob,chest pain (27 Nov 2017 11:05)      Patient seen and examined at bedside. No chest pain/sob    VITALS:  T(F): 97.9 (12-07-17 @ 14:20), Max: 97.9 (12-07-17 @ 14:20)  HR: 87 (12-07-17 @ 14:20)  BP: 112/63 (12-07-17 @ 14:20)  RR: 18 (12-07-17 @ 14:20)  SpO2: 100% (12-07-17 @ 14:20)  Wt(kg): --        PHYSICAL EXAM:  Constitutional: NAD  Neck: No JVD  Respiratory: CTAB, no wheezes, rales or rhonchi  Cardiovascular: S1, S2, RRR  Gastrointestinal: BS+, soft, NT/ND  Extremities: No peripheral edema    Hospital Medications:   MEDICATIONS  (STANDING):  aspirin enteric coated 81 milliGRAM(s) Oral daily  atorvastatin 40 milliGRAM(s) Oral at bedtime  dextrose 5%. 1000 milliLiter(s) (50 mL/Hr) IV Continuous <Continuous>  dextrose 50% Injectable 12.5 Gram(s) IV Push once  dextrose 50% Injectable 25 Gram(s) IV Push once  dextrose 50% Injectable 25 Gram(s) IV Push once  docusate sodium 100 milliGRAM(s) Oral three times a day  insulin glargine Injectable (LANTUS) 65 Unit(s) SubCutaneous at bedtime  insulin lispro (HumaLOG) corrective regimen sliding scale   SubCutaneous three times a day before meals  insulin lispro (HumaLOG) corrective regimen sliding scale   SubCutaneous at bedtime  insulin lispro Injectable (HumaLOG) 24 Unit(s) SubCutaneous three times a day before meals  levothyroxine 50 MICROGram(s) Oral daily  metoprolol succinate ER 12.5 milliGRAM(s) Oral daily  montelukast 10 milliGRAM(s) Oral at bedtime  pantoprazole    Tablet 40 milliGRAM(s) Oral two times a day before meals  polyethylene glycol 3350 17 Gram(s) Oral daily  senna 2 Tablet(s) Oral at bedtime  sucralfate suspension 1 Gram(s) Oral four times a day  ticagrelor 90 milliGRAM(s) Oral two times a day      LABS:  12-07    148<H>  |  112<H>  |  33<H>  ----------------------------<  157<H>  4.0   |  22  |  1.63<H>    Ca    9.3      07 Dec 2017 06:15  Phos  2.9     12-06  Mg     2.0     12-06      Creatinine Trend: 1.63 <--, 1.68 <--, 1.54 <--, 1.81 <--, 1.94 <--, 2.25 <--, 2.01 <--, 2.00 <--                                9.8    9.77  )-----------( 332      ( 07 Dec 2017 06:15 )             31.5     Urine Studies:  Urinalysis - [11-25-17 @ 01:00]      Color PLYEL / Appearance CLEAR / SG 1.020 / pH 6.0      Gluc >1000 / Ketone NEGATIVE  / Bili NEGATIVE / Urobili NORMAL       Blood TRACE / Protein 150 / Leuk Est NEGATIVE / Nitrite NEGATIVE      RBC 2-5 / WBC 10-25 / Hyaline 2-5 / Gran  / Sq Epi OCC / Non Sq Epi  / Bacteria FEW      HbA1c 9.1      [11-25-17 @ 06:30]  TSH 0.79      [11-25-17 @ 06:30]  Lipid: chol 136, , HDL 37, LDL 80      [11-25-17 @ 06:30]        RADIOLOGY & ADDITIONAL STUDIES:

## 2017-12-07 NOTE — PROGRESS NOTE ADULT - PROBLEM SELECTOR PLAN 1
- NSTEMI and TTE with new moderate to severe LV dysfxn  - cardiology work up appreciated  - keep on ppi for gi ppx while in a/c  - s/p cardiac cath w/ stent placement  now on brilinta  monitor stools for any evidence of GIB  SOB this AM, getting IV lasix per cards/nephro

## 2017-12-07 NOTE — CHART NOTE - NSCHARTNOTEFT_GEN_A_CORE
Source: Patient [X]    Family [ ]     other [X]; Review of the patient's medical chart, daughter-in-law at bedside.     Current Diet : Consistent Carbohydrate; Lowfat; Renal;   Reported:  [ ] nausea  [ ] vomiting [ ] diarrhea [ ] constipation  [ ]chewing problems [ ] swallowing issues  [ ] other:   PO intake:  < 50% [ ] 50-75% [X]   % [ ]  other :  Current Weight:  57.2 kgs (12/7), Admit Wt. 56.6kgs (11/27).  No significant Wt. changes.     Nutrition follow-up for extended LOS.  Improved PO intake reported, and PO intake said to be "fair."  No GI distress (nausea/vomiting/diarrhea/constipation) at this time.  No difficulties chewing and swallowing. Family also brings  outside food for Pt. Recommended diet modifications reinforced.       __________________ Pertinent Medications__________________   MEDICATIONS  (STANDING):  aspirin enteric coated 81 milliGRAM(s) Oral daily  atorvastatin 40 milliGRAM(s) Oral at bedtime  dextrose 5%. 1000 milliLiter(s) (50 mL/Hr) IV Continuous <Continuous>  dextrose 50% Injectable 12.5 Gram(s) IV Push once  dextrose 50% Injectable 25 Gram(s) IV Push once  dextrose 50% Injectable 25 Gram(s) IV Push once  docusate sodium 100 milliGRAM(s) Oral three times a day  furosemide   Injectable 40 milliGRAM(s) IV Push once  insulin glargine Injectable (LANTUS) 65 Unit(s) SubCutaneous at bedtime  insulin lispro (HumaLOG) corrective regimen sliding scale   SubCutaneous three times a day before meals  insulin lispro (HumaLOG) corrective regimen sliding scale   SubCutaneous at bedtime  insulin lispro Injectable (HumaLOG) 24 Unit(s) SubCutaneous three times a day before meals  levothyroxine 50 MICROGram(s) Oral daily  metoprolol succinate ER 12.5 milliGRAM(s) Oral daily  montelukast 10 milliGRAM(s) Oral at bedtime  pantoprazole    Tablet 40 milliGRAM(s) Oral two times a day before meals  polyethylene glycol 3350 17 Gram(s) Oral daily  senna 2 Tablet(s) Oral at bedtime  sucralfate suspension 1 Gram(s) Oral four times a day  ticagrelor 90 milliGRAM(s) Oral two times a day    MEDICATIONS  (PRN):  aluminum hydroxide/magnesium hydroxide/simethicone Suspension 30 milliLiter(s) Oral every 4 hours PRN Dyspepsia  dextrose Gel 1 Dose(s) Oral once PRN Blood Glucose LESS THAN 70 milliGRAM(s)/deciliter  glucagon  Injectable 1 milliGRAM(s) IntraMuscular once PRN Glucose LESS THAN 70 milligrams/deciliter  glucagon  Injectable 1 milliGRAM(s) IntraMuscular once PRN Glucose LESS THAN 70 milligrams/deciliter      __________________ Pertinent Labs__________________   12-07 Na148 mmol/L<H> Glu 157 mg/dL<H> K+ 4.0 mmol/L Cr  1.63 mg/dL<H> BUN 33 mg/dL<H> Phos n/a   Alb n/a   PAB n/a     CAPILLARY BLOOD GLUCOSE      POCT Blood Glucose.: 166 mg/dL (07 Dec 2017 08:10)  POCT Blood Glucose.: 291 mg/dL (06 Dec 2017 22:04)  POCT Blood Glucose.: 265 mg/dL (06 Dec 2017 16:26)  POCT Blood Glucose.: 260 mg/dL (06 Dec 2017 12:01)      **Nutrition Recommendations**  1- Continue current diet order, which remains appropriate at this time.   2- Monitor weights, labs, BM's, skin integrity, p.o. intake.   3- Consider Glucerna Therapeutic Nutrition Shake 240mls 1x daily (220kcals, 10g protein).   4- Please Encourage po intake, assist with meals and menu selections, provide alternatives PRN.   5- RD remains available, re-consult as needed.

## 2017-12-08 LAB
BUN SERPL-MCNC: 34 MG/DL — HIGH (ref 7–23)
CALCIUM SERPL-MCNC: 9.2 MG/DL — SIGNIFICANT CHANGE UP (ref 8.4–10.5)
CHLORIDE SERPL-SCNC: 109 MMOL/L — HIGH (ref 98–107)
CO2 SERPL-SCNC: 23 MMOL/L — SIGNIFICANT CHANGE UP (ref 22–31)
CREAT SERPL-MCNC: 1.59 MG/DL — HIGH (ref 0.5–1.3)
GLUCOSE BLDC GLUCOMTR-MCNC: 221 MG/DL — HIGH (ref 70–99)
GLUCOSE BLDC GLUCOMTR-MCNC: 260 MG/DL — HIGH (ref 70–99)
GLUCOSE BLDC GLUCOMTR-MCNC: 297 MG/DL — HIGH (ref 70–99)
GLUCOSE BLDC GLUCOMTR-MCNC: 79 MG/DL — SIGNIFICANT CHANGE UP (ref 70–99)
GLUCOSE SERPL-MCNC: 109 MG/DL — HIGH (ref 70–99)
HCT VFR BLD CALC: 31.1 % — LOW (ref 34.5–45)
HGB BLD-MCNC: 9.7 G/DL — LOW (ref 11.5–15.5)
MAGNESIUM SERPL-MCNC: 1.8 MG/DL — SIGNIFICANT CHANGE UP (ref 1.6–2.6)
MCHC RBC-ENTMCNC: 27.7 PG — SIGNIFICANT CHANGE UP (ref 27–34)
MCHC RBC-ENTMCNC: 31.2 % — LOW (ref 32–36)
MCV RBC AUTO: 88.9 FL — SIGNIFICANT CHANGE UP (ref 80–100)
NRBC # FLD: 0 — SIGNIFICANT CHANGE UP
PLATELET # BLD AUTO: 328 K/UL — SIGNIFICANT CHANGE UP (ref 150–400)
PMV BLD: 9 FL — SIGNIFICANT CHANGE UP (ref 7–13)
POTASSIUM SERPL-MCNC: 3.7 MMOL/L — SIGNIFICANT CHANGE UP (ref 3.5–5.3)
POTASSIUM SERPL-SCNC: 3.7 MMOL/L — SIGNIFICANT CHANGE UP (ref 3.5–5.3)
RBC # BLD: 3.5 M/UL — LOW (ref 3.8–5.2)
RBC # FLD: 13.5 % — SIGNIFICANT CHANGE UP (ref 10.3–14.5)
SODIUM SERPL-SCNC: 145 MMOL/L — SIGNIFICANT CHANGE UP (ref 135–145)
WBC # BLD: 10.41 K/UL — SIGNIFICANT CHANGE UP (ref 3.8–10.5)
WBC # FLD AUTO: 10.41 K/UL — SIGNIFICANT CHANGE UP (ref 3.8–10.5)

## 2017-12-08 PROCEDURE — 71250 CT THORAX DX C-: CPT | Mod: 26

## 2017-12-08 RX ORDER — FUROSEMIDE 40 MG
40 TABLET ORAL DAILY
Qty: 0 | Refills: 0 | Status: DISCONTINUED | OUTPATIENT
Start: 2017-12-08 | End: 2017-12-08

## 2017-12-08 RX ORDER — FUROSEMIDE 40 MG
40 TABLET ORAL DAILY
Qty: 0 | Refills: 0 | Status: DISCONTINUED | OUTPATIENT
Start: 2017-12-09 | End: 2017-12-11

## 2017-12-08 RX ORDER — FUROSEMIDE 40 MG
40 TABLET ORAL ONCE
Qty: 0 | Refills: 0 | Status: DISCONTINUED | OUTPATIENT
Start: 2017-12-08 | End: 2017-12-08

## 2017-12-08 RX ORDER — FUROSEMIDE 40 MG
20 TABLET ORAL ONCE
Qty: 0 | Refills: 0 | Status: COMPLETED | OUTPATIENT
Start: 2017-12-08 | End: 2017-12-08

## 2017-12-08 RX ADMIN — Medication 24 UNIT(S): at 18:00

## 2017-12-08 RX ADMIN — MONTELUKAST 10 MILLIGRAM(S): 4 TABLET, CHEWABLE ORAL at 21:58

## 2017-12-08 RX ADMIN — PANTOPRAZOLE SODIUM 40 MILLIGRAM(S): 20 TABLET, DELAYED RELEASE ORAL at 18:01

## 2017-12-08 RX ADMIN — Medication 1 GRAM(S): at 23:07

## 2017-12-08 RX ADMIN — Medication 24 UNIT(S): at 12:43

## 2017-12-08 RX ADMIN — Medication 4: at 12:43

## 2017-12-08 RX ADMIN — Medication 100 MILLIGRAM(S): at 06:34

## 2017-12-08 RX ADMIN — Medication 20 MILLIGRAM(S): at 18:00

## 2017-12-08 RX ADMIN — Medication 81 MILLIGRAM(S): at 11:56

## 2017-12-08 RX ADMIN — Medication 2: at 22:27

## 2017-12-08 RX ADMIN — Medication 50 MICROGRAM(S): at 05:49

## 2017-12-08 RX ADMIN — Medication 4: at 18:00

## 2017-12-08 RX ADMIN — Medication 1 GRAM(S): at 11:56

## 2017-12-08 RX ADMIN — Medication 1 GRAM(S): at 05:49

## 2017-12-08 RX ADMIN — SENNA PLUS 2 TABLET(S): 8.6 TABLET ORAL at 21:58

## 2017-12-08 RX ADMIN — TICAGRELOR 90 MILLIGRAM(S): 90 TABLET ORAL at 18:00

## 2017-12-08 RX ADMIN — INSULIN GLARGINE 65 UNIT(S): 100 INJECTION, SOLUTION SUBCUTANEOUS at 22:27

## 2017-12-08 RX ADMIN — Medication 1 GRAM(S): at 18:00

## 2017-12-08 RX ADMIN — PANTOPRAZOLE SODIUM 40 MILLIGRAM(S): 20 TABLET, DELAYED RELEASE ORAL at 06:34

## 2017-12-08 RX ADMIN — ATORVASTATIN CALCIUM 40 MILLIGRAM(S): 80 TABLET, FILM COATED ORAL at 21:58

## 2017-12-08 RX ADMIN — TICAGRELOR 90 MILLIGRAM(S): 90 TABLET ORAL at 05:49

## 2017-12-08 RX ADMIN — Medication 40 MILLIGRAM(S): at 11:56

## 2017-12-08 RX ADMIN — Medication 100 MILLIGRAM(S): at 21:58

## 2017-12-08 RX ADMIN — Medication 12.5 MILLIGRAM(S): at 05:49

## 2017-12-08 NOTE — PROGRESS NOTE ADULT - ASSESSMENT
1.	MERVIN, initially sec to cardio-renal syndrome. Renal function was improving with diuresing, then worsened possible sec to hyperglycemia, improving today. CKD stage 3: Baseline Scr 1.5-1.8: at baseline  2.	CHF decompensation:  Follow up cardiology  3.	Metabolic Acidosis: Better  4.	Hyponatremia: now hypernatremia  5.	Hypokalemia: being supplemented.     PLAN:  1.	continue lasix 40mg PO daily,  f/u cardio  2.	Follow up renal function and electrolyte  3.	stable for discharge from renal stand point. can follow up with dr. gracia in 1-2 weeks

## 2017-12-08 NOTE — PROGRESS NOTE ADULT - SUBJECTIVE AND OBJECTIVE BOX
Dr. Muhammad (Nephrology)  Office [phone]  Cell [phone]  [facility]  Cell [phone]      Patient is a 72y old  Female who presents with a chief complaint of sob,chest pain (27 Nov 2017 11:05)      Patient seen and examined at bedside. No chest pain/sob, feeling better today    VITALS:  T(F): 97.6 (12-08-17 @ 05:00), Max: 98.1 (12-07-17 @ 20:21)  HR: 73 (12-08-17 @ 05:00)  BP: 115/59 (12-08-17 @ 05:00)  RR: 18 (12-08-17 @ 05:00)  SpO2: 100% (12-08-17 @ 05:00)  Wt(kg): --        PHYSICAL EXAM:  Constitutional: NAD  Neck: No JVD  Respiratory: CTAB, no wheezes, rales or rhonchi  Cardiovascular: S1, S2, RRR  Gastrointestinal: BS+, soft, NT/ND  Extremities: No peripheral edema    Hospital Medications:   MEDICATIONS  (STANDING):  aspirin enteric coated 81 milliGRAM(s) Oral daily  atorvastatin 40 milliGRAM(s) Oral at bedtime  dextrose 5%. 1000 milliLiter(s) (50 mL/Hr) IV Continuous <Continuous>  dextrose 50% Injectable 12.5 Gram(s) IV Push once  dextrose 50% Injectable 25 Gram(s) IV Push once  dextrose 50% Injectable 25 Gram(s) IV Push once  docusate sodium 100 milliGRAM(s) Oral three times a day  furosemide    Tablet 40 milliGRAM(s) Oral daily  insulin glargine Injectable (LANTUS) 65 Unit(s) SubCutaneous at bedtime  insulin lispro (HumaLOG) corrective regimen sliding scale   SubCutaneous three times a day before meals  insulin lispro (HumaLOG) corrective regimen sliding scale   SubCutaneous at bedtime  insulin lispro Injectable (HumaLOG) 24 Unit(s) SubCutaneous three times a day before meals  levothyroxine 50 MICROGram(s) Oral daily  metoprolol succinate ER 12.5 milliGRAM(s) Oral daily  montelukast 10 milliGRAM(s) Oral at bedtime  pantoprazole    Tablet 40 milliGRAM(s) Oral two times a day before meals  polyethylene glycol 3350 17 Gram(s) Oral daily  senna 2 Tablet(s) Oral at bedtime  sucralfate suspension 1 Gram(s) Oral four times a day  ticagrelor 90 milliGRAM(s) Oral two times a day      LABS:  12-08    145  |  109<H>  |  34<H>  ----------------------------<  109<H>  3.7   |  23  |  1.59<H>    Ca    9.2      08 Dec 2017 06:00  Mg     1.8     12-08      Creatinine Trend: 1.59 <--, 1.63 <--, 1.68 <--, 1.54 <--, 1.81 <--, 1.94 <--, 2.25 <--, 2.01 <--                                9.7    10.41 )-----------( 328      ( 08 Dec 2017 06:00 )             31.1     Urine Studies:  Urinalysis - [11-25-17 @ 01:00]      Color PLYEL / Appearance CLEAR / SG 1.020 / pH 6.0      Gluc >1000 / Ketone NEGATIVE  / Bili NEGATIVE / Urobili NORMAL       Blood TRACE / Protein 150 / Leuk Est NEGATIVE / Nitrite NEGATIVE      RBC 2-5 / WBC 10-25 / Hyaline 2-5 / Gran  / Sq Epi OCC / Non Sq Epi  / Bacteria FEW      HbA1c 9.1      [11-25-17 @ 06:30]  TSH 0.79      [11-25-17 @ 06:30]  Lipid: chol 136, , HDL 37, LDL 80      [11-25-17 @ 06:30]        RADIOLOGY & ADDITIONAL STUDIES:

## 2017-12-08 NOTE — PROGRESS NOTE ADULT - SUBJECTIVE AND OBJECTIVE BOX
Subjective: No CP, c/o SOB worse at night       MEDICATIONS  (STANDING):  aspirin enteric coated 81 milliGRAM(s) Oral daily  atorvastatin 40 milliGRAM(s) Oral at bedtime  dextrose 5%. 1000 milliLiter(s) (50 mL/Hr) IV Continuous <Continuous>  dextrose 50% Injectable 12.5 Gram(s) IV Push once  dextrose 50% Injectable 25 Gram(s) IV Push once  dextrose 50% Injectable 25 Gram(s) IV Push once  docusate sodium 100 milliGRAM(s) Oral three times a day  furosemide   Injectable 20 milliGRAM(s) IV Push once  insulin glargine Injectable (LANTUS) 65 Unit(s) SubCutaneous at bedtime  insulin lispro (HumaLOG) corrective regimen sliding scale   SubCutaneous three times a day before meals  insulin lispro (HumaLOG) corrective regimen sliding scale   SubCutaneous at bedtime  insulin lispro Injectable (HumaLOG) 24 Unit(s) SubCutaneous three times a day before meals  levothyroxine 50 MICROGram(s) Oral daily  metoprolol succinate ER 12.5 milliGRAM(s) Oral daily  montelukast 10 milliGRAM(s) Oral at bedtime  pantoprazole    Tablet 40 milliGRAM(s) Oral two times a day before meals  polyethylene glycol 3350 17 Gram(s) Oral daily  senna 2 Tablet(s) Oral at bedtime  sucralfate suspension 1 Gram(s) Oral four times a day  ticagrelor 90 milliGRAM(s) Oral two times a day    MEDICATIONS  (PRN):  aluminum hydroxide/magnesium hydroxide/simethicone Suspension 30 milliLiter(s) Oral every 4 hours PRN Dyspepsia  dextrose Gel 1 Dose(s) Oral once PRN Blood Glucose LESS THAN 70 milliGRAM(s)/deciliter  glucagon  Injectable 1 milliGRAM(s) IntraMuscular once PRN Glucose LESS THAN 70 milligrams/deciliter  glucagon  Injectable 1 milliGRAM(s) IntraMuscular once PRN Glucose LESS THAN 70 milligrams/deciliter      LABS:                        9.7    10.41 )-----------( 328      ( 08 Dec 2017 06:00 )             31.1     Hemoglobin: 9.7 g/dL (12-08 @ 06:00)  Hemoglobin: 9.8 g/dL (12-07 @ 06:15)  Hemoglobin: 10.2 g/dL (12-06 @ 06:00)  Hemoglobin: 10.5 g/dL (12-05 @ 05:56)  Hemoglobin: 10.5 g/dL (12-04 @ 06:45)    12-08    145  |  109<H>  |  34<H>  ----------------------------<  109<H>  3.7   |  23  |  1.59<H>    Ca    9.2      08 Dec 2017 06:00  Mg     1.8     12-08      Creatinine Trend: 1.59<--, 1.63<--, 1.68<--, 1.54<--, 1.81<--, 1.94<--     CARDIAC MARKERS ( 07 Dec 2017 06:15 )  x     / 0.41 ng/mL / 71 u/L / 5.26 ng/mL / x            PHYSICAL EXAM  Vital Signs Last 24 Hrs  T(C): 36.6 (08 Dec 2017 11:50), Max: 36.7 (07 Dec 2017 20:21)  T(F): 97.9 (08 Dec 2017 11:50), Max: 98.1 (07 Dec 2017 20:21)  HR: 81 (08 Dec 2017 11:50) (73 - 87)  BP: 119/50 (08 Dec 2017 11:50) (115/59 - 133/61)  BP(mean): --  RR: 18 (08 Dec 2017 11:50) (18 - 18)  SpO2: 100% (08 Dec 2017 11:50) (98% - 100%)      Cardiovascular: Normal S1 S2,     No JVD, 1/6 YUSUF murmur, Peripheral pulses palpable 2+ bilaterally  Respiratory: Bibasilar crackles	  Gastrointestinal:  Soft, Non-tender, + BS	  Extremities no edema, cyanosis, clubbing B/L LE's     DIAGNOSTIC TESTING:    < from: Cardiac Cath Lab - Adult (10.13.17 @ 10:40) >  VENTRICLES: No left ventriculogram was performed.  CORONARY VESSELS: The coronary circulation is right dominant.  LM:   --  LM: Normal.  LAD:   --  Proximal LAD: There was a diffuse 60 % stenosis.  --  Mid LAD: There was a 100 % stenosis with the distal vessel being filled  via LIMA-LAD.  CX:   --  Circumflex: The vessel was small sized. Angiography showed mild  atherosclerosis withno flow limiting lesions.  --  OM1: Angiography showed mild atherosclerosis with no flow limiting  lesions.  RI:   --  Ostial ramus intermedius: There was a tubular 80 % stenosis.  There was PARUL grade 3 flow through the vessel (brisk flow).  RCA:   --  Proximal RCA: Angiography showed mild atherosclerosis with no  flow limiting lesions.  --  Mid RCA: There was a 100 % stenosis with the distal vessel being filled  via collaterals. This lesion is a chronic total occlusion.  GRAFTS:   --  Graft to the LAD: The graft was a LIMA. Graft angiography  showed minor luminal irregularities. Distal vessel angiography showed  minor luminal irregularities.  AORTA: Ascending aorta: Normal. No additional grafts visualized in  aortogram.  COMPLICATIONS: There were no complications.  DIAGNOSTIC RECOMMENDATIONS: Coronary angiogram demonstrates patent  LIMA-LAD, closed SVG grafts, and a severe stenosis in the ostial Ramus.  Will perform staged PCI to RI when Cr stable and renally optimized.  Prepared and signed by  Corie Ga M.D.  Signed 10/17/2017 13:49:15    < end of copied text >    < from: Cardiac Cath Lab - Adult (11.17.17 @ 11:48) >  PROCEDURE:  --  Intervention on ramus intermedius: drug-eluting stent.    VENTRICLES: No LV gram was performed; however, a recent echocardiogram  demonstrated normal global and regional LV function.  CORONARY VESSELS: The coronary circulation is right dominant.  LM:   --  LM: Angiography showed minor luminal irregularities with no flow  limiting lesions.  LAD:   --  Ostial LAD: There was a 100 % stenosis.  CX:   --  Circumflex: This vessel was not injected, but was visualized  during a prior cardiac catheterization.  RI:   --  Ramus intermedius: There was a 95 % stenosis.  RCA:   --  RCA: This vessel was not injected.  COMPLICATIONS: There were no complications.  DIAGNOSTIC RECOMMENDATIONS: The patient should continue with the present  medications. will add plavix 75mg po once a day/ will add ECASA 325mg po  once day.  Prepared and signed by  Roberta Quick M.D.  Signed 11/17/2017 13:16:01    < end of copied text >    < from: TTE with Doppler (w/Cont) (11.25.17 @ 12:33) >  CONCLUSIONS:  1. Mitral annular calcification, otherwise normal mitral  valve. Mild mitral regurgitation.  2. Normal left ventricular internal dimensions and wall  thicknesses.  3. Endocardium not well visualized; grossly moderate to  severe segmental left ventricularsystolic dysfunction.  Hypokinesis of the inferior, lateral, and inferolateral  walls.  Endocardial visualization enhanced with intravenous  injection of echo contrast (Definity).  No LV thrombus  seen.  4. The right ventricle is not well visualized;grossly  normal right ventricular systolic function.  ------------------------------------------------------------------------  Confirmed on  11/25/2017 - 14:44:41 by Jose Lucia M.D.    < end of copied text >    < from: CT Chest No Cont (12.01.17 @ 17:38) >  IMPRESSION:   No acute pulmonary disease.    JENA STEARNS D.O., RADIOLOGY RESIDENT  This document has been electronically signed.  MEAGAN SUMMERS M.D., ATTENDING RADIOLOGIST  This document has been electronically signed. Dec  2 2017 10:29AM    < end of copied text >    < from: Cardiac Cath Lab - Adult (12.05.17 @ 12:48) >  VENTRICLES: No LV gram was performed; however, a recent echocardiogram  demonstrated an EF of 36 %.  CORONARY VESSELS: The coronary circulation is right dominant.  LM:   --  Distal left main: Angiography showed minor luminal irregularities  with no flow limiting lesions.  LAD:   --  Mid LAD: There was a 100 % stenosis with the distal vessel being  filled via LIMA-LAD.  CX:   --  Proximal circumflex: There was a tubular 20 % stenosis.  --  Mid circumflex: Angiography showed minor luminal irregularities with no  flow limiting lesions.  --  Distal circumflex: Angiography showed minor luminal irregularities with  no flow limiting lesions.  --  OM1: The vessel was small sized. There was a discrete 60 % stenosis.  RI:   --  Ostial ramus intermedius: Angiography showed minor luminal  irregularities with no flow limiting lesions. There was no significant  restenosis in RI stent.  --  Proximal ramus intermedius: Angiography showed minor luminal  irregularities with no flow limiting lesions.  --  Mid ramus intermedius: There was a tubular 80 % stenosis. The lesion  was associated with a small filling defect consistent with thrombus.  RCA:   --  Mid RCA: There was a 100 % stenosis with the distal vessel being  filled via collaterals from the LAD.  GRAFTS:   --  Graft to the LAD: The graft was a LIMA. Graft angiography  showed minor luminal irregularities. Distal vessel angiography showed  minor luminalirregularities.  COMPLICATIONS: There were no complications.  DIAGNOSTIC RECOMMENDATIONS: Coronary angiogram demonstrates a severe  stenosis in the ramus intermedius that is the cause of the patient's  clinical presentation. Will therefore perform PCI to the RI.  INTERVENTIONAL RECOMMENDATIONS: S/p successful CORNELIA to the Ramus  Intermedius. The patient should continue with ASA and Brilinta for at  least 12 months.  Prepared and signed by  Corie Ga M.D.  Signed 12/06/2017 17:06:30    < end of copied text >    CT CHEST   < from: CT Chest No Cont (12.08.17 @ 15:52) >  IMPRESSION:   Left upper lobe 7 mm nodule unchanged from prior study. One-year   follow-up CT recommended.    < end of copied text >        ASSESSMENT/PLAN: 	  71 yr old  Female w/ PMH HTN, CKD, Hyperlipidemia, DM-II, CAD s/p 3V CABG  (LIMA to LAD, SVG to OM, SVG to PDA) at [facility] 2014, s/P CORNELIA TO RI 11/17/17 presenting with shortness of breath, cough, Hypoxic in ED with O2 sats in the 70s, fevers, Leukocytosis and elevated lactate.  CXR showed pulmonary edema in ED. EKG with new intraventricular block and STD V3,V4,V5, CE elevated c/w NSTEMI    --Pulm edema/acute decomp systolic HF, s/p diuresis, now compensated  --NSTEMI and TTE with new moderate to severe LV dysfxn --s/p LHC on 12/5 s/p CORNELIA to D Ramus  --CXR 12/7 clear lungs  --CT chest  as noted above  unchanged nodule   --F/U Renal-- lasix 40 IV starting in AM 12/9 x 1 dose 20 IV today   --F/U Pulm recommendations   --F/u PT recommendations

## 2017-12-08 NOTE — PROGRESS NOTE ADULT - SUBJECTIVE AND OBJECTIVE BOX
Chief complaint  Patient is a 72y old  Female who presents with a chief complaint of sob,chest pain (27 Nov 2017 11:05)   Review of systems  Patient in bed, looks comfortable, no fever, no hypoglycemia.    Labs and Fingersticks    CAPILLARY BLOOD GLUCOSE        Calcium, Total Serum: 9.2 (12-08 @ 06:00)  Calcium, Total Serum: 9.3 (12-07 @ 06:15)  Calcium, Total Serum: 9.2 (12-06 @ 19:40)          12-08    145  |  109<H>  |  34<H>  ----------------------------<  109<H>  3.7   |  23  |  1.59<H>    Ca    9.2      08 Dec 2017 06:00  Mg     1.8     12-08                          9.7    10.41 )-----------( 328      ( 08 Dec 2017 06:00 )             31.1     Medications  MEDICATIONS  (STANDING):  aspirin enteric coated 81 milliGRAM(s) Oral daily  atorvastatin 40 milliGRAM(s) Oral at bedtime  dextrose 5%. 1000 milliLiter(s) (50 mL/Hr) IV Continuous <Continuous>  dextrose 50% Injectable 12.5 Gram(s) IV Push once  dextrose 50% Injectable 25 Gram(s) IV Push once  dextrose 50% Injectable 25 Gram(s) IV Push once  docusate sodium 100 milliGRAM(s) Oral three times a day  furosemide   Injectable 20 milliGRAM(s) IV Push once  insulin glargine Injectable (LANTUS) 65 Unit(s) SubCutaneous at bedtime  insulin lispro (HumaLOG) corrective regimen sliding scale   SubCutaneous three times a day before meals  insulin lispro (HumaLOG) corrective regimen sliding scale   SubCutaneous at bedtime  insulin lispro Injectable (HumaLOG) 24 Unit(s) SubCutaneous three times a day before meals  levothyroxine 50 MICROGram(s) Oral daily  metoprolol succinate ER 12.5 milliGRAM(s) Oral daily  montelukast 10 milliGRAM(s) Oral at bedtime  pantoprazole    Tablet 40 milliGRAM(s) Oral two times a day before meals  polyethylene glycol 3350 17 Gram(s) Oral daily  senna 2 Tablet(s) Oral at bedtime  sucralfate suspension 1 Gram(s) Oral four times a day  ticagrelor 90 milliGRAM(s) Oral two times a day      Physical Exam  General: Patient comfortable in bed  Vital Signs Last 12 Hrs  T(F): 97.9 (12-08-17 @ 11:50), Max: 97.9 (12-08-17 @ 11:50)  HR: 81 (12-08-17 @ 11:50) (73 - 81)  BP: 119/50 (12-08-17 @ 11:50) (115/59 - 119/50)  BP(mean): --  RR: 18 (12-08-17 @ 11:50) (18 - 18)  SpO2: 100% (12-08-17 @ 11:50) (100% - 100%)  Neck: No palpable thyroid nodules.  CVS: S1S2, No murmurs  Respiratory: No wheezing, no crepitations  GI: Abdomen soft, bowel sounds positive  Musculoskeletal: Positive edema lower extremities.   Skin: No skin rashes, no ecchymosis    Diagnostics

## 2017-12-08 NOTE — PROGRESS NOTE ADULT - SUBJECTIVE AND OBJECTIVE BOX
INTERVAL HPI/OVERNIGHT EVENTS:  Pt seen and examined at bedside no new events    MEDICATIONS  (STANDING):  aspirin enteric coated 81 milliGRAM(s) Oral daily  atorvastatin 40 milliGRAM(s) Oral at bedtime  dextrose 5%. 1000 milliLiter(s) (50 mL/Hr) IV Continuous <Continuous>  dextrose 50% Injectable 12.5 Gram(s) IV Push once  dextrose 50% Injectable 25 Gram(s) IV Push once  dextrose 50% Injectable 25 Gram(s) IV Push once  docusate sodium 100 milliGRAM(s) Oral three times a day  insulin glargine Injectable (LANTUS) 65 Unit(s) SubCutaneous at bedtime  insulin lispro (HumaLOG) corrective regimen sliding scale   SubCutaneous three times a day before meals  insulin lispro (HumaLOG) corrective regimen sliding scale   SubCutaneous at bedtime  insulin lispro Injectable (HumaLOG) 24 Unit(s) SubCutaneous three times a day before meals  levothyroxine 50 MICROGram(s) Oral daily  metoprolol succinate ER 12.5 milliGRAM(s) Oral daily  montelukast 10 milliGRAM(s) Oral at bedtime  pantoprazole    Tablet 40 milliGRAM(s) Oral two times a day before meals  polyethylene glycol 3350 17 Gram(s) Oral daily  senna 2 Tablet(s) Oral at bedtime  sucralfate suspension 1 Gram(s) Oral four times a day  ticagrelor 90 milliGRAM(s) Oral two times a day    MEDICATIONS  (PRN):  aluminum hydroxide/magnesium hydroxide/simethicone Suspension 30 milliLiter(s) Oral every 4 hours PRN Dyspepsia  dextrose Gel 1 Dose(s) Oral once PRN Blood Glucose LESS THAN 70 milliGRAM(s)/deciliter  glucagon  Injectable 1 milliGRAM(s) IntraMuscular once PRN Glucose LESS THAN 70 milligrams/deciliter  glucagon  Injectable 1 milliGRAM(s) IntraMuscular once PRN Glucose LESS THAN 70 milligrams/deciliter      Allergies    No Known Allergies    Intolerances        ROS:   General:  No wt loss, fevers, chills, night sweats, fatigue,   Eyes:  Good vision, no reported pain  ENT:  No sore throat, pain, runny nose, dysphagia  CV:  No pain, palpitations, hypo/hypertension  Resp:  No dyspnea, cough, tachypnea, wheezing  GI:  No pain, No nausea, No vomiting, No diarrhea, No constipation, No weight loss, No fever, No pruritis, No rectal bleeding, No tarry stools, No dysphagia,  :  No pain, bleeding, incontinence, nocturia  Muscle:  No pain, weakness  Neuro:  No weakness, tingling, memory problems  Psych:  No fatigue, insomnia, mood problems, depression  Endocrine:  No polyuria, polydipsia, cold/heat intolerance  Heme:  No petechiae, ecchymosis, easy bruisability  Skin:  No rash, tattoos, scars, edema      PHYSICAL EXAM:   Vital Signs:  Vital Signs Last 24 Hrs  T(C): 36.6 (07 Dec 2017 12:41), Max: 36.6 (07 Dec 2017 12:41)  T(F): 97.8 (07 Dec 2017 12:41), Max: 97.8 (07 Dec 2017 12:41)  HR: 78 (07 Dec 2017 12:41) (78 - 81)  BP: 118/56 (07 Dec 2017 12:41) (114/65 - 122/69)  BP(mean): --  RR: 18 (07 Dec 2017 12:41) (18 - 18)  SpO2: 100% (07 Dec 2017 12:41) (100% - 100%)  Daily     Daily Weight in k.2 (07 Dec 2017 06:25)    GENERAL:  Appears stated age, well-groomed, well-nourished, no distress  HEENT:  NC/AT,  conjunctivae clear and pink, no thyromegaly, nodules, adenopathy, no JVD, sclera -anicteric  CHEST:  Full & symmetric excursion, no increased effort, breath sounds clear  HEART:  Regular rhythm, S1, S2, no murmur/rub/S3/S4, no abdominal bruit, no edema  ABDOMEN:  Soft, non-tender, non-distended, normoactive bowel sounds,  no masses ,no hepato-splenomegaly, no signs of chronic liver disease  EXTEREMITIES:  no cyanosis,clubbing or edema  SKIN:  No rash/erythema/ecchymoses/petechiae/wounds/abscess/warm/dry  NEURO:  Alert, oriented, no asterixis, no tremor, no encephalopathy      LABS:                        9.8    9.77  )-----------( 332      ( 07 Dec 2017 06:15 )             31.5     12-    148<H>  |  112<H>  |  33<H>  ----------------------------<  157<H>  4.0   |  22  |  1.63<H>    Ca    9.3      07 Dec 2017 06:15  Phos  2.9     12-  Mg     2.0     -            RADIOLOGY & ADDITIONAL TESTS:

## 2017-12-09 LAB
BUN SERPL-MCNC: 40 MG/DL — HIGH (ref 7–23)
CALCIUM SERPL-MCNC: 9.2 MG/DL — SIGNIFICANT CHANGE UP (ref 8.4–10.5)
CHLORIDE SERPL-SCNC: 103 MMOL/L — SIGNIFICANT CHANGE UP (ref 98–107)
CO2 SERPL-SCNC: 19 MMOL/L — LOW (ref 22–31)
CREAT SERPL-MCNC: 1.75 MG/DL — HIGH (ref 0.5–1.3)
GLUCOSE BLDC GLUCOMTR-MCNC: 167 MG/DL — HIGH (ref 70–99)
GLUCOSE BLDC GLUCOMTR-MCNC: 190 MG/DL — HIGH (ref 70–99)
GLUCOSE BLDC GLUCOMTR-MCNC: 217 MG/DL — HIGH (ref 70–99)
GLUCOSE BLDC GLUCOMTR-MCNC: 222 MG/DL — HIGH (ref 70–99)
GLUCOSE SERPL-MCNC: 214 MG/DL — HIGH (ref 70–99)
HCT VFR BLD CALC: 31.2 % — LOW (ref 34.5–45)
HGB BLD-MCNC: 10.2 G/DL — LOW (ref 11.5–15.5)
MAGNESIUM SERPL-MCNC: 1.8 MG/DL — SIGNIFICANT CHANGE UP (ref 1.6–2.6)
MCHC RBC-ENTMCNC: 29 PG — SIGNIFICANT CHANGE UP (ref 27–34)
MCHC RBC-ENTMCNC: 32.7 % — SIGNIFICANT CHANGE UP (ref 32–36)
MCV RBC AUTO: 88.6 FL — SIGNIFICANT CHANGE UP (ref 80–100)
NRBC # FLD: 0 — SIGNIFICANT CHANGE UP
PLATELET # BLD AUTO: 310 K/UL — SIGNIFICANT CHANGE UP (ref 150–400)
PMV BLD: 9.1 FL — SIGNIFICANT CHANGE UP (ref 7–13)
POTASSIUM SERPL-MCNC: 3.6 MMOL/L — SIGNIFICANT CHANGE UP (ref 3.5–5.3)
POTASSIUM SERPL-SCNC: 3.6 MMOL/L — SIGNIFICANT CHANGE UP (ref 3.5–5.3)
RBC # BLD: 3.52 M/UL — LOW (ref 3.8–5.2)
RBC # FLD: 13.4 % — SIGNIFICANT CHANGE UP (ref 10.3–14.5)
SODIUM SERPL-SCNC: 138 MMOL/L — SIGNIFICANT CHANGE UP (ref 135–145)
WBC # BLD: 11.02 K/UL — HIGH (ref 3.8–10.5)
WBC # FLD AUTO: 11.02 K/UL — HIGH (ref 3.8–10.5)

## 2017-12-09 RX ADMIN — Medication 40 MILLIGRAM(S): at 05:50

## 2017-12-09 RX ADMIN — PANTOPRAZOLE SODIUM 40 MILLIGRAM(S): 20 TABLET, DELAYED RELEASE ORAL at 17:30

## 2017-12-09 RX ADMIN — SENNA PLUS 2 TABLET(S): 8.6 TABLET ORAL at 21:01

## 2017-12-09 RX ADMIN — Medication 12.5 MILLIGRAM(S): at 05:49

## 2017-12-09 RX ADMIN — Medication 24 UNIT(S): at 08:24

## 2017-12-09 RX ADMIN — Medication 24 UNIT(S): at 12:08

## 2017-12-09 RX ADMIN — TICAGRELOR 90 MILLIGRAM(S): 90 TABLET ORAL at 05:49

## 2017-12-09 RX ADMIN — TICAGRELOR 90 MILLIGRAM(S): 90 TABLET ORAL at 17:29

## 2017-12-09 RX ADMIN — Medication 81 MILLIGRAM(S): at 12:07

## 2017-12-09 RX ADMIN — POLYETHYLENE GLYCOL 3350 17 GRAM(S): 17 POWDER, FOR SOLUTION ORAL at 12:07

## 2017-12-09 RX ADMIN — Medication 24 UNIT(S): at 17:29

## 2017-12-09 RX ADMIN — Medication 1 GRAM(S): at 05:53

## 2017-12-09 RX ADMIN — Medication 1 GRAM(S): at 17:30

## 2017-12-09 RX ADMIN — Medication 50 MICROGRAM(S): at 05:49

## 2017-12-09 RX ADMIN — Medication 1 GRAM(S): at 21:01

## 2017-12-09 RX ADMIN — Medication 100 MILLIGRAM(S): at 13:11

## 2017-12-09 RX ADMIN — Medication: at 08:25

## 2017-12-09 RX ADMIN — Medication 100 MILLIGRAM(S): at 05:50

## 2017-12-09 RX ADMIN — MONTELUKAST 10 MILLIGRAM(S): 4 TABLET, CHEWABLE ORAL at 21:01

## 2017-12-09 RX ADMIN — Medication 100 MILLIGRAM(S): at 21:00

## 2017-12-09 RX ADMIN — PANTOPRAZOLE SODIUM 40 MILLIGRAM(S): 20 TABLET, DELAYED RELEASE ORAL at 05:49

## 2017-12-09 RX ADMIN — Medication 4: at 12:08

## 2017-12-09 RX ADMIN — Medication 2: at 17:29

## 2017-12-09 RX ADMIN — Medication 1 GRAM(S): at 12:07

## 2017-12-09 RX ADMIN — INSULIN GLARGINE 65 UNIT(S): 100 INJECTION, SOLUTION SUBCUTANEOUS at 22:04

## 2017-12-09 RX ADMIN — ATORVASTATIN CALCIUM 40 MILLIGRAM(S): 80 TABLET, FILM COATED ORAL at 21:01

## 2017-12-09 NOTE — PROGRESS NOTE ADULT - SUBJECTIVE AND OBJECTIVE BOX
INTERVAL HPI/OVERNIGHT EVENTS: PT seen and examined at bedside, no new complaints    MEDICATIONS  (STANDING):  aspirin enteric coated 81 milliGRAM(s) Oral daily  atorvastatin 40 milliGRAM(s) Oral at bedtime  dextrose 5%. 1000 milliLiter(s) (50 mL/Hr) IV Continuous <Continuous>  dextrose 50% Injectable 12.5 Gram(s) IV Push once  dextrose 50% Injectable 25 Gram(s) IV Push once  dextrose 50% Injectable 25 Gram(s) IV Push once  docusate sodium 100 milliGRAM(s) Oral three times a day  furosemide   Injectable 40 milliGRAM(s) IV Push daily  insulin glargine Injectable (LANTUS) 65 Unit(s) SubCutaneous at bedtime  insulin lispro (HumaLOG) corrective regimen sliding scale   SubCutaneous three times a day before meals  insulin lispro (HumaLOG) corrective regimen sliding scale   SubCutaneous at bedtime  insulin lispro Injectable (HumaLOG) 24 Unit(s) SubCutaneous three times a day before meals  levothyroxine 50 MICROGram(s) Oral daily  metoprolol succinate ER 12.5 milliGRAM(s) Oral daily  montelukast 10 milliGRAM(s) Oral at bedtime  pantoprazole    Tablet 40 milliGRAM(s) Oral two times a day before meals  polyethylene glycol 3350 17 Gram(s) Oral daily  senna 2 Tablet(s) Oral at bedtime  sucralfate suspension 1 Gram(s) Oral four times a day  ticagrelor 90 milliGRAM(s) Oral two times a day    MEDICATIONS  (PRN):  aluminum hydroxide/magnesium hydroxide/simethicone Suspension 30 milliLiter(s) Oral every 4 hours PRN Dyspepsia  dextrose Gel 1 Dose(s) Oral once PRN Blood Glucose LESS THAN 70 milliGRAM(s)/deciliter  glucagon  Injectable 1 milliGRAM(s) IntraMuscular once PRN Glucose LESS THAN 70 milligrams/deciliter  glucagon  Injectable 1 milliGRAM(s) IntraMuscular once PRN Glucose LESS THAN 70 milligrams/deciliter      Allergies    No Known Allergies    Intolerances        Review of Systems:    General:  No wt loss, fevers, chills, night sweats,fatigue,   Eyes:  Good vision, no reported pain  ENT:  No sore throat, pain, runny nose, dysphagia  CV:  No pain, palpitatioins, hypo/hypertension  Resp:  No dyspnea, cough, tachypnea, wheezing  GI:  No pain, No nausea, No vomiting, No diarrhea, No constipatiion, No weight loss, No fever, No pruritis, No rectal bleeding, No tarry stools, No dysphagia,  :  No pain, bleeding, incontinence, nocturia  Muscle:  No pain, weakness  Neuro:  No weakness, tingling, memory problems  Psych:  No fatigue, insomnia, mood problems, depression  Endocrine:  No polyuria, polydypsia, cold/heat intolerance  Heme:  No petechiae, ecchymosis, easy bruisability  Skin:  No rash, tattoos, scars, edema      Vital Signs Last 24 Hrs  T(C): 36.6 (09 Dec 2017 12:14), Max: 36.8 (09 Dec 2017 05:32)  T(F): 97.9 (09 Dec 2017 12:14), Max: 98.2 (09 Dec 2017 05:32)  HR: 88 (09 Dec 2017 12:14) (80 - 90)  BP: 109/60 (09 Dec 2017 12:14) (105/48 - 125/62)  BP(mean): --  RR: 18 (09 Dec 2017 12:14) (18 - 18)  SpO2: 100% (09 Dec 2017 12:14) (97% - 100%)    PHYSICAL EXAM:    Constitutional: NAD, well-developed  HEENT: EOMI, throat clear  Neck: No LAD, supple  Respiratory: CTA and P  Cardiovascular: S1 and S2, RRR, no M  Gastrointestinal: BS+, soft, NT/ND, neg HSM,  Extremities: No peripheral edema, neg clubing, cyanosis  Vascular: 2+ peripheral pulses  Neurological: A/O x 3, no focal deficits  Psychiatric: Normal mood, normal affect  Skin: No rashes      LABS:                        10.2   11.02 )-----------( 310      ( 09 Dec 2017 05:47 )             31.2     12-09    138  |  103  |  40<H>  ----------------------------<  214<H>  3.6   |  19<L>  |  1.75<H>    Ca    9.2      09 Dec 2017 05:47  Mg     1.8     12-09            RADIOLOGY & ADDITIONAL TESTS:

## 2017-12-09 NOTE — PROGRESS NOTE ADULT - SUBJECTIVE AND OBJECTIVE BOX
Chief complaint  Patient is a 72y old  Female who presents with a chief complaint of sob,chest pain (27 Nov 2017 11:05)   Review of systems  Patient in bed, looks comfortable, no fever, no hypoglycemia.    Labs and Fingersticks    CAPILLARY BLOOD GLUCOSE        Calcium, Total Serum: 9.2 (12-09 @ 05:47)  Calcium, Total Serum: 9.2 (12-08 @ 06:00)          12-09    138  |  103  |  40<H>  ----------------------------<  214<H>  3.6   |  19<L>  |  1.75<H>    Ca    9.2      09 Dec 2017 05:47  Mg     1.8     12-09                          10.2   11.02 )-----------( 310      ( 09 Dec 2017 05:47 )             31.2     Medications  MEDICATIONS  (STANDING):  aspirin enteric coated 81 milliGRAM(s) Oral daily  atorvastatin 40 milliGRAM(s) Oral at bedtime  dextrose 5%. 1000 milliLiter(s) (50 mL/Hr) IV Continuous <Continuous>  dextrose 50% Injectable 12.5 Gram(s) IV Push once  dextrose 50% Injectable 25 Gram(s) IV Push once  dextrose 50% Injectable 25 Gram(s) IV Push once  docusate sodium 100 milliGRAM(s) Oral three times a day  furosemide   Injectable 40 milliGRAM(s) IV Push daily  insulin glargine Injectable (LANTUS) 65 Unit(s) SubCutaneous at bedtime  insulin lispro (HumaLOG) corrective regimen sliding scale   SubCutaneous three times a day before meals  insulin lispro (HumaLOG) corrective regimen sliding scale   SubCutaneous at bedtime  insulin lispro Injectable (HumaLOG) 24 Unit(s) SubCutaneous three times a day before meals  levothyroxine 50 MICROGram(s) Oral daily  metoprolol succinate ER 12.5 milliGRAM(s) Oral daily  montelukast 10 milliGRAM(s) Oral at bedtime  pantoprazole    Tablet 40 milliGRAM(s) Oral two times a day before meals  polyethylene glycol 3350 17 Gram(s) Oral daily  senna 2 Tablet(s) Oral at bedtime  sucralfate suspension 1 Gram(s) Oral four times a day  ticagrelor 90 milliGRAM(s) Oral two times a day      Physical Exam  General: Patient comfortable in bed  Vital Signs Last 12 Hrs  T(F): 97.9 (12-09-17 @ 12:14), Max: 97.9 (12-09-17 @ 12:14)  HR: 88 (12-09-17 @ 12:14) (88 - 88)  BP: 109/60 (12-09-17 @ 12:14) (109/60 - 109/60)  BP(mean): --  RR: 18 (12-09-17 @ 12:14) (18 - 18)  SpO2: 100% (12-09-17 @ 12:14) (100% - 100%)  Neck: No palpable thyroid nodules.  CVS: S1S2, No murmurs  Respiratory: No wheezing, no crepitations  GI: Abdomen soft, bowel sounds positive  Musculoskeletal: Positive edema lower extremities.   Skin: No skin rashes, no ecchymosis    Diagnostics

## 2017-12-09 NOTE — PROGRESS NOTE ADULT - ASSESSMENT
1.	MERVIN, initially sec to cardio-renal syndrome. Renal function was improving with diuresing, then worsened possible sec to hyperglycemia, improving today. CKD stage 3: Baseline Scr 1.5-1.8: at baseline  2.	CHF decompensation:  Follow up cardiology  3.	Metabolic Acidosis: Better  4.	Hyponatremia: now hypernatremia  5.	Hypokalemia: being supplemented.     PLAN:  1.	diuresing per cardio  2.	Follow up renal function and electrolyte  3.	stable for discharge from renal stand point. can follow up with dr. gracia in 1-2 weeks 1.	MERVIN, initially sec to cardio-renal syndrome. Renal function was improving with diuresing, then worsened possible sec to hyperglycemia, improving today. CKD stage 3: Baseline Scr 1.5-1.8: at baseline  2.	CHF decompensation:  Follow up cardiology  3.	Metabolic Acidosis: Better  4.	Hyponatremia: Better  5.	Hypokalemia: supplemented.     PLAN:  1.	diuresing per cardio  2.	Follow up renal function and electrolyte  3.	stable for discharge from renal stand point. can follow up with dr. gracia in 1-2 weeks

## 2017-12-09 NOTE — PROGRESS NOTE ADULT - SUBJECTIVE AND OBJECTIVE BOX
Subjective: No CP, or SOB today     MEDICATIONS  (STANDING):  aspirin enteric coated 81 milliGRAM(s) Oral daily  atorvastatin 40 milliGRAM(s) Oral at bedtime  dextrose 5%. 1000 milliLiter(s) (50 mL/Hr) IV Continuous <Continuous>  dextrose 50% Injectable 12.5 Gram(s) IV Push once  dextrose 50% Injectable 25 Gram(s) IV Push once  dextrose 50% Injectable 25 Gram(s) IV Push once  docusate sodium 100 milliGRAM(s) Oral three times a day  furosemide   Injectable 40 milliGRAM(s) IV Push daily  insulin glargine Injectable (LANTUS) 65 Unit(s) SubCutaneous at bedtime  insulin lispro (HumaLOG) corrective regimen sliding scale   SubCutaneous three times a day before meals  insulin lispro (HumaLOG) corrective regimen sliding scale   SubCutaneous at bedtime  insulin lispro Injectable (HumaLOG) 24 Unit(s) SubCutaneous three times a day before meals  levothyroxine 50 MICROGram(s) Oral daily  metoprolol succinate ER 12.5 milliGRAM(s) Oral daily  montelukast 10 milliGRAM(s) Oral at bedtime  pantoprazole    Tablet 40 milliGRAM(s) Oral two times a day before meals  polyethylene glycol 3350 17 Gram(s) Oral daily  senna 2 Tablet(s) Oral at bedtime  sucralfate suspension 1 Gram(s) Oral four times a day  ticagrelor 90 milliGRAM(s) Oral two times a day    MEDICATIONS  (PRN):  aluminum hydroxide/magnesium hydroxide/simethicone Suspension 30 milliLiter(s) Oral every 4 hours PRN Dyspepsia  dextrose Gel 1 Dose(s) Oral once PRN Blood Glucose LESS THAN 70 milliGRAM(s)/deciliter  glucagon  Injectable 1 milliGRAM(s) IntraMuscular once PRN Glucose LESS THAN 70 milligrams/deciliter  glucagon  Injectable 1 milliGRAM(s) IntraMuscular once PRN Glucose LESS THAN 70 milligrams/deciliter      LABS:                        10.2   11.02 )-----------( 310      ( 09 Dec 2017 05:47 )             31.2     Hemoglobin: 10.2 g/dL (12-09 @ 05:47)  Hemoglobin: 9.7 g/dL (12-08 @ 06:00)  Hemoglobin: 9.8 g/dL (12-07 @ 06:15)  Hemoglobin: 10.2 g/dL (12-06 @ 06:00)  Hemoglobin: 10.5 g/dL (12-05 @ 05:56)    12-09    138  |  103  |  40<H>  ----------------------------<  214<H>  3.6   |  19<L>  |  1.75<H>    Ca    9.2      09 Dec 2017 05:47  Mg     1.8     12-09      Creatinine Trend: 1.75<--, 1.59<--, 1.63<--, 1.68<--, 1.54<--, 1.81<--     CARDIAC MARKERS ( 07 Dec 2017 06:15 )  x     / 0.41 ng/mL / 71 u/L / 5.26 ng/mL / x            PHYSICAL EXAM  Vital Signs Last 24 Hrs  T(C): 36.6 (09 Dec 2017 12:14), Max: 36.8 (09 Dec 2017 05:32)  T(F): 97.9 (09 Dec 2017 12:14), Max: 98.2 (09 Dec 2017 05:32)  HR: 88 (09 Dec 2017 12:14) (80 - 90)  BP: 109/60 (09 Dec 2017 12:14) (105/48 - 125/62)  BP(mean): --  RR: 18 (09 Dec 2017 12:14) (18 - 18)  SpO2: 100% (09 Dec 2017 12:14) (97% - 100%)    Cardiovascular: Normal S1 S2,     No JVD, 1/6 YUSUF murmur, Peripheral pulses palpable 2+ bilaterally  Respiratory: Bibasilar crackles	  Gastrointestinal:  Soft, Non-tender, + BS	  Extremities no edema, cyanosis, clubbing B/L LE's     DIAGNOSTIC TESTING:    < from: Cardiac Cath Lab - Adult (10.13.17 @ 10:40) >  VENTRICLES: No left ventriculogram was performed.  CORONARY VESSELS: The coronary circulation is right dominant.  LM:   --  LM: Normal.  LAD:   --  Proximal LAD: There was a diffuse 60 % stenosis.  --  Mid LAD: There was a 100 % stenosis with the distal vessel being filled  via LIMA-LAD.  CX:   --  Circumflex: The vessel was small sized. Angiography showed mild  atherosclerosis withno flow limiting lesions.  --  OM1: Angiography showed mild atherosclerosis with no flow limiting  lesions.  RI:   --  Ostial ramus intermedius: There was a tubular 80 % stenosis.  There was PARUL grade 3 flow through the vessel (brisk flow).  RCA:   --  Proximal RCA: Angiography showed mild atherosclerosis with no  flow limiting lesions.  --  Mid RCA: There was a 100 % stenosis with the distal vessel being filled  via collaterals. This lesion is a chronic total occlusion.  GRAFTS:   --  Graft to the LAD: The graft was a LIMA. Graft angiography  showed minor luminal irregularities. Distal vessel angiography showed  minor luminal irregularities.  AORTA: Ascending aorta: Normal. No additional grafts visualized in  aortogram.  COMPLICATIONS: There were no complications.  DIAGNOSTIC RECOMMENDATIONS: Coronary angiogram demonstrates patent  LIMA-LAD, closed SVG grafts, and a severe stenosis in the ostial Ramus.  Will perform staged PCI to RI when Cr stable and renally optimized.  Prepared and signed by  Corie Ga M.D.  Signed 10/17/2017 13:49:15    < end of copied text >    < from: Cardiac Cath Lab - Adult (11.17.17 @ 11:48) >  PROCEDURE:  --  Intervention on ramus intermedius: drug-eluting stent.    VENTRICLES: No LV gram was performed; however, a recent echocardiogram  demonstrated normal global and regional LV function.  CORONARY VESSELS: The coronary circulation is right dominant.  LM:   --  LM: Angiography showed minor luminal irregularities with no flow  limiting lesions.  LAD:   --  Ostial LAD: There was a 100 % stenosis.  CX:   --  Circumflex: This vessel was not injected, but was visualized  during a prior cardiac catheterization.  RI:   --  Ramus intermedius: There was a 95 % stenosis.  RCA:   --  RCA: This vessel was not injected.  COMPLICATIONS: There were no complications.  DIAGNOSTIC RECOMMENDATIONS: The patient should continue with the present  medications. will add plavix 75mg po once a day/ will add ECASA 325mg po  once day.  Prepared and signed by  Roberta Quick M.D.  Signed 11/17/2017 13:16:01    < end of copied text >    < from: TTE with Doppler (w/Cont) (11.25.17 @ 12:33) >  CONCLUSIONS:  1. Mitral annular calcification, otherwise normal mitral  valve. Mild mitral regurgitation.  2. Normal left ventricular internal dimensions and wall  thicknesses.  3. Endocardium not well visualized; grossly moderate to  severe segmental left ventricularsystolic dysfunction.  Hypokinesis of the inferior, lateral, and inferolateral  walls.  Endocardial visualization enhanced with intravenous  injection of echo contrast (Definity).  No LV thrombus  seen.  4. The right ventricle is not well visualized;grossly  normal right ventricular systolic function.  ------------------------------------------------------------------------  Confirmed on  11/25/2017 - 14:44:41 by Jose Lucia M.D.    < end of copied text >    < from: CT Chest No Cont (12.01.17 @ 17:38) >  IMPRESSION:   No acute pulmonary disease.    JENA STEARNS D.O., RADIOLOGY RESIDENT  This document has been electronically signed.  MEAGAN SUMMERS M.D., ATTENDING RADIOLOGIST  This document has been electronically signed. Dec  2 2017 10:29AM    < end of copied text >    < from: Cardiac Cath Lab - Adult (12.05.17 @ 12:48) >  VENTRICLES: No LV gram was performed; however, a recent echocardiogram  demonstrated an EF of 36 %.  CORONARY VESSELS: The coronary circulation is right dominant.  LM:   --  Distal left main: Angiography showed minor luminal irregularities  with no flow limiting lesions.  LAD:   --  Mid LAD: There was a 100 % stenosis with the distal vessel being  filled via LIMA-LAD.  CX:   --  Proximal circumflex: There was a tubular 20 % stenosis.  --  Mid circumflex: Angiography showed minor luminal irregularities with no  flow limiting lesions.  --  Distal circumflex: Angiography showed minor luminal irregularities with  no flow limiting lesions.  --  OM1: The vessel was small sized. There was a discrete 60 % stenosis.  RI:   --  Ostial ramus intermedius: Angiography showed minor luminal  irregularities with no flow limiting lesions. There was no significant  restenosis in RI stent.  --  Proximal ramus intermedius: Angiography showed minor luminal  irregularities with no flow limiting lesions.  --  Mid ramus intermedius: There was a tubular 80 % stenosis. The lesion  was associated with a small filling defect consistent with thrombus.  RCA:   --  Mid RCA: There was a 100 % stenosis with the distal vessel being  filled via collaterals from the LAD.  GRAFTS:   --  Graft to the LAD: The graft was a LIMA. Graft angiography  showed minor luminal irregularities. Distal vessel angiography showed  minor luminalirregularities.  COMPLICATIONS: There were no complications.  DIAGNOSTIC RECOMMENDATIONS: Coronary angiogram demonstrates a severe  stenosis in the ramus intermedius that is the cause of the patient's  clinical presentation. Will therefore perform PCI to the RI.  INTERVENTIONAL RECOMMENDATIONS: S/p successful CORNELIA to the Ramus  Intermedius. The patient should continue with ASA and Brilinta for at  least 12 months.  Prepared and signed by  Corie Ga M.D.  Signed 12/06/2017 17:06:30    < end of copied text >    CT CHEST   < from: CT Chest No Cont (12.08.17 @ 15:52) >  IMPRESSION:   Left upper lobe 7 mm nodule unchanged from prior study. One-year   follow-up CT recommended.    < end of copied text >        ASSESSMENT/PLAN: 	  71 yr old  Female w/ PMH HTN, CKD, Hyperlipidemia, DM-II, CAD s/p 3V CABG  (LIMA to LAD, SVG to OM, SVG to PDA) at [facility] 2014, s/P CORNELIA TO RI 11/17/17 presenting with shortness of breath, cough, Hypoxic in ED with O2 sats in the 70s, fevers, Leukocytosis and elevated lactate.  CXR showed pulmonary edema in ED. EKG with new intraventricular block and STD V3,V4,V5, CE elevated c/w NSTEMI    --Pulm edema/acute decomp systolic HF, s/p diuresis, now compensated  --NSTEMI and TTE with new moderate to severe LV dysfxn --s/p LHC on 12/5 s/p CORNELIA to D Ramus  --CXR 12/7 clear lungs  --CT chest  as noted above  unchanged nodule   --F/U Renal  -- started on lasix 40 IV BID monitor cr & K levels daily weights and I/O' keeping I<O's  -- PT no skilled needs

## 2017-12-09 NOTE — PROGRESS NOTE ADULT - PROBLEM SELECTOR PLAN 1
- NSTEMI and TTE with new moderate to severe LV dysfxn  - cardiology work up appreciated  - keep on ppi for gi ppx while in a/c  - s/p cardiac cath w/ stent placement  now on brilinta  monitor stools for any evidence of GIB  H/H stable

## 2017-12-09 NOTE — PROGRESS NOTE ADULT - SUBJECTIVE AND OBJECTIVE BOX
Dr. Muhammad (Nephrology)  Office [phone]  Cell [phone]  [facility] FIELD  Cell [phone]      Patient is a 72y old  Female who presents with a chief complaint of sob,chest pain (27 Nov 2017 11:05)      Patient seen and examined at bedside. No chest pain/sob    VITALS:  T(F): 98.2 (12-09-17 @ 05:32), Max: 98.2 (12-09-17 @ 05:32)  HR: 80 (12-09-17 @ 05:32)  BP: 119/58 (12-09-17 @ 05:32)  RR: 18 (12-09-17 @ 05:32)  SpO2: 100% (12-09-17 @ 05:32)  Wt(kg): --        PHYSICAL EXAM:  Constitutional: NAD  Neck: No JVD  Respiratory: CTAB, no wheezes, rales or rhonchi  Cardiovascular: S1, S2, RRR  Gastrointestinal: BS+, soft, NT/ND  Extremities: No peripheral edema    Hospital Medications:   MEDICATIONS  (STANDING):  aspirin enteric coated 81 milliGRAM(s) Oral daily  atorvastatin 40 milliGRAM(s) Oral at bedtime  dextrose 5%. 1000 milliLiter(s) (50 mL/Hr) IV Continuous <Continuous>  dextrose 50% Injectable 12.5 Gram(s) IV Push once  dextrose 50% Injectable 25 Gram(s) IV Push once  dextrose 50% Injectable 25 Gram(s) IV Push once  docusate sodium 100 milliGRAM(s) Oral three times a day  furosemide   Injectable 40 milliGRAM(s) IV Push daily  insulin glargine Injectable (LANTUS) 65 Unit(s) SubCutaneous at bedtime  insulin lispro (HumaLOG) corrective regimen sliding scale   SubCutaneous three times a day before meals  insulin lispro (HumaLOG) corrective regimen sliding scale   SubCutaneous at bedtime  insulin lispro Injectable (HumaLOG) 24 Unit(s) SubCutaneous three times a day before meals  levothyroxine 50 MICROGram(s) Oral daily  metoprolol succinate ER 12.5 milliGRAM(s) Oral daily  montelukast 10 milliGRAM(s) Oral at bedtime  pantoprazole    Tablet 40 milliGRAM(s) Oral two times a day before meals  polyethylene glycol 3350 17 Gram(s) Oral daily  senna 2 Tablet(s) Oral at bedtime  sucralfate suspension 1 Gram(s) Oral four times a day  ticagrelor 90 milliGRAM(s) Oral two times a day      LABS:  12-09    138  |  103  |  40<H>  ----------------------------<  214<H>  3.6   |  19<L>  |  1.75<H>    Ca    9.2      09 Dec 2017 05:47  Mg     1.8     12-09      Creatinine Trend: 1.75 <--, 1.59 <--, 1.63 <--, 1.68 <--, 1.54 <--, 1.81 <--, 1.94 <--, 2.25 <--                                10.2   11.02 )-----------( 310      ( 09 Dec 2017 05:47 )             31.2     Urine Studies:  Urinalysis - [11-25-17 @ 01:00]      Color PLYEL / Appearance CLEAR / SG 1.020 / pH 6.0      Gluc >1000 / Ketone NEGATIVE  / Bili NEGATIVE / Urobili NORMAL       Blood TRACE / Protein 150 / Leuk Est NEGATIVE / Nitrite NEGATIVE      RBC 2-5 / WBC 10-25 / Hyaline 2-5 / Gran  / Sq Epi OCC / Non Sq Epi  / Bacteria FEW      HbA1c 9.1      [11-25-17 @ 06:30]  TSH 0.79      [11-25-17 @ 06:30]  Lipid: chol 136, , HDL 37, LDL 80      [11-25-17 @ 06:30]        RADIOLOGY & ADDITIONAL STUDIES:

## 2017-12-10 LAB
APPEARANCE UR: CLEAR — SIGNIFICANT CHANGE UP
BILIRUB UR-MCNC: NEGATIVE — SIGNIFICANT CHANGE UP
BLOOD UR QL VISUAL: NEGATIVE — SIGNIFICANT CHANGE UP
BUN SERPL-MCNC: 51 MG/DL — HIGH (ref 7–23)
CALCIUM SERPL-MCNC: 9.8 MG/DL — SIGNIFICANT CHANGE UP (ref 8.4–10.5)
CHLORIDE SERPL-SCNC: 100 MMOL/L — SIGNIFICANT CHANGE UP (ref 98–107)
CO2 SERPL-SCNC: 20 MMOL/L — LOW (ref 22–31)
COLOR SPEC: SIGNIFICANT CHANGE UP
CREAT SERPL-MCNC: 1.95 MG/DL — HIGH (ref 0.5–1.3)
GLUCOSE BLDC GLUCOMTR-MCNC: 254 MG/DL — HIGH (ref 70–99)
GLUCOSE BLDC GLUCOMTR-MCNC: 268 MG/DL — HIGH (ref 70–99)
GLUCOSE BLDC GLUCOMTR-MCNC: 304 MG/DL — HIGH (ref 70–99)
GLUCOSE BLDC GLUCOMTR-MCNC: 313 MG/DL — HIGH (ref 70–99)
GLUCOSE SERPL-MCNC: 239 MG/DL — HIGH (ref 70–99)
GLUCOSE UR-MCNC: 100 — HIGH
HCT VFR BLD CALC: 33 % — LOW (ref 34.5–45)
HGB BLD-MCNC: 10.7 G/DL — LOW (ref 11.5–15.5)
KETONES UR-MCNC: NEGATIVE — SIGNIFICANT CHANGE UP
LEUKOCYTE ESTERASE UR-ACNC: NEGATIVE — SIGNIFICANT CHANGE UP
MAGNESIUM SERPL-MCNC: 1.9 MG/DL — SIGNIFICANT CHANGE UP (ref 1.6–2.6)
MCHC RBC-ENTMCNC: 27.9 PG — SIGNIFICANT CHANGE UP (ref 27–34)
MCHC RBC-ENTMCNC: 32.4 % — SIGNIFICANT CHANGE UP (ref 32–36)
MCV RBC AUTO: 85.9 FL — SIGNIFICANT CHANGE UP (ref 80–100)
MUCOUS THREADS # UR AUTO: SIGNIFICANT CHANGE UP
NITRITE UR-MCNC: NEGATIVE — SIGNIFICANT CHANGE UP
NRBC # FLD: 0 — SIGNIFICANT CHANGE UP
PH UR: 5 — SIGNIFICANT CHANGE UP (ref 4.6–8)
PLATELET # BLD AUTO: 355 K/UL — SIGNIFICANT CHANGE UP (ref 150–400)
PMV BLD: 9.1 FL — SIGNIFICANT CHANGE UP (ref 7–13)
POTASSIUM SERPL-MCNC: 3.5 MMOL/L — SIGNIFICANT CHANGE UP (ref 3.5–5.3)
POTASSIUM SERPL-SCNC: 3.5 MMOL/L — SIGNIFICANT CHANGE UP (ref 3.5–5.3)
PROT UR-MCNC: NEGATIVE MG/DL — SIGNIFICANT CHANGE UP
RBC # BLD: 3.84 M/UL — SIGNIFICANT CHANGE UP (ref 3.8–5.2)
RBC # FLD: 13.3 % — SIGNIFICANT CHANGE UP (ref 10.3–14.5)
RBC CASTS # UR COMP ASSIST: SIGNIFICANT CHANGE UP (ref 0–?)
SODIUM SERPL-SCNC: 138 MMOL/L — SIGNIFICANT CHANGE UP (ref 135–145)
SP GR SPEC: 1.01 — SIGNIFICANT CHANGE UP (ref 1–1.04)
SQUAMOUS # UR AUTO: SIGNIFICANT CHANGE UP
UROBILINOGEN FLD QL: NORMAL MG/DL — SIGNIFICANT CHANGE UP
WBC # BLD: 13.49 K/UL — HIGH (ref 3.8–10.5)
WBC # FLD AUTO: 13.49 K/UL — HIGH (ref 3.8–10.5)
WBC UR QL: SIGNIFICANT CHANGE UP (ref 0–?)

## 2017-12-10 PROCEDURE — 93010 ELECTROCARDIOGRAM REPORT: CPT

## 2017-12-10 RX ADMIN — Medication 24 UNIT(S): at 17:50

## 2017-12-10 RX ADMIN — Medication 6: at 17:50

## 2017-12-10 RX ADMIN — Medication 24 UNIT(S): at 09:26

## 2017-12-10 RX ADMIN — ATORVASTATIN CALCIUM 40 MILLIGRAM(S): 80 TABLET, FILM COATED ORAL at 21:49

## 2017-12-10 RX ADMIN — Medication 24 UNIT(S): at 12:44

## 2017-12-10 RX ADMIN — Medication 8: at 12:43

## 2017-12-10 RX ADMIN — Medication 81 MILLIGRAM(S): at 12:44

## 2017-12-10 RX ADMIN — TICAGRELOR 90 MILLIGRAM(S): 90 TABLET ORAL at 05:41

## 2017-12-10 RX ADMIN — TICAGRELOR 90 MILLIGRAM(S): 90 TABLET ORAL at 17:54

## 2017-12-10 RX ADMIN — Medication 100 MILLIGRAM(S): at 12:45

## 2017-12-10 RX ADMIN — Medication 100 MILLIGRAM(S): at 21:48

## 2017-12-10 RX ADMIN — SENNA PLUS 2 TABLET(S): 8.6 TABLET ORAL at 21:49

## 2017-12-10 RX ADMIN — Medication 12.5 MILLIGRAM(S): at 05:40

## 2017-12-10 RX ADMIN — Medication 1 GRAM(S): at 17:50

## 2017-12-10 RX ADMIN — Medication 40 MILLIGRAM(S): at 05:41

## 2017-12-10 RX ADMIN — Medication 6: at 09:25

## 2017-12-10 RX ADMIN — Medication 100 MILLIGRAM(S): at 05:41

## 2017-12-10 RX ADMIN — PANTOPRAZOLE SODIUM 40 MILLIGRAM(S): 20 TABLET, DELAYED RELEASE ORAL at 17:54

## 2017-12-10 RX ADMIN — INSULIN GLARGINE 65 UNIT(S): 100 INJECTION, SOLUTION SUBCUTANEOUS at 21:48

## 2017-12-10 RX ADMIN — PANTOPRAZOLE SODIUM 40 MILLIGRAM(S): 20 TABLET, DELAYED RELEASE ORAL at 05:41

## 2017-12-10 RX ADMIN — MONTELUKAST 10 MILLIGRAM(S): 4 TABLET, CHEWABLE ORAL at 21:48

## 2017-12-10 RX ADMIN — Medication 4: at 21:48

## 2017-12-10 RX ADMIN — Medication 50 MICROGRAM(S): at 05:41

## 2017-12-10 RX ADMIN — Medication 1 GRAM(S): at 05:41

## 2017-12-10 RX ADMIN — Medication 1 GRAM(S): at 12:44

## 2017-12-10 NOTE — PROGRESS NOTE ADULT - PROBLEM SELECTOR PLAN 1
- NSTEMI and TTE with new moderate to severe LV dysfxn  - cardiology work up appreciated  - keep on ppi for gi ppx while in a/c  - s/p cardiac cath w/ stent placement  now on brilinta  monitor stools for any evidence of GIB  H/H stable  on IV diuresis per cards/nephro

## 2017-12-10 NOTE — PROGRESS NOTE ADULT - ASSESSMENT
1.	MERVIN, initially sec to cardio-renal syndrome. Renal function was improving with diuresing, then worsened possible sec to hyperglycemia, contrast. CKD stage 3: Baseline Scr 1.5-1.8  2.	CHF decompensation:  Follow up cardiology  3.	Metabolic Acidosis: improving  4.	Hyponatremia: Improved  5.	Hypokalemia: improved    PLAN:  1.	diuresing per cardio  2.	Follow up renal function and electrolyte  3.	stable for discharge from renal stand point. can follow up with dr. gracia in 1-2 weeks 1.	MERVIN, initially sec to cardio-renal syndrome. Renal function was improving with diuresing, then worsened possible sec to hyperglycemia/contrast. CKD stage 3: Baseline Scr 1.5-1.8  2.	CHF decompensation:  Follow up cardiology  3.	Metabolic Acidosis: improving  4.	Hyponatremia: Improved  5.	Hypokalemia: improved    PLAN:  1.	diuresing per cardio  2.	Monitor renal function and electrolyte at present  3.	Better diabetes control

## 2017-12-10 NOTE — PROGRESS NOTE ADULT - SUBJECTIVE AND OBJECTIVE BOX
Subjective: No CP, or SOB today     MEDICATIONS  (STANDING):  aspirin enteric coated 81 milliGRAM(s) Oral daily  atorvastatin 40 milliGRAM(s) Oral at bedtime  dextrose 5%. 1000 milliLiter(s) (50 mL/Hr) IV Continuous <Continuous>  dextrose 50% Injectable 12.5 Gram(s) IV Push once  dextrose 50% Injectable 25 Gram(s) IV Push once  dextrose 50% Injectable 25 Gram(s) IV Push once  docusate sodium 100 milliGRAM(s) Oral three times a day  furosemide   Injectable 40 milliGRAM(s) IV Push daily  insulin glargine Injectable (LANTUS) 65 Unit(s) SubCutaneous at bedtime  insulin lispro (HumaLOG) corrective regimen sliding scale   SubCutaneous three times a day before meals  insulin lispro (HumaLOG) corrective regimen sliding scale   SubCutaneous at bedtime  insulin lispro Injectable (HumaLOG) 24 Unit(s) SubCutaneous three times a day before meals  levothyroxine 50 MICROGram(s) Oral daily  metoprolol succinate ER 12.5 milliGRAM(s) Oral daily  montelukast 10 milliGRAM(s) Oral at bedtime  pantoprazole    Tablet 40 milliGRAM(s) Oral two times a day before meals  polyethylene glycol 3350 17 Gram(s) Oral daily  senna 2 Tablet(s) Oral at bedtime  sucralfate suspension 1 Gram(s) Oral four times a day  ticagrelor 90 milliGRAM(s) Oral two times a day    MEDICATIONS  (PRN):  aluminum hydroxide/magnesium hydroxide/simethicone Suspension 30 milliLiter(s) Oral every 4 hours PRN Dyspepsia  dextrose Gel 1 Dose(s) Oral once PRN Blood Glucose LESS THAN 70 milliGRAM(s)/deciliter  glucagon  Injectable 1 milliGRAM(s) IntraMuscular once PRN Glucose LESS THAN 70 milligrams/deciliter  glucagon  Injectable 1 milliGRAM(s) IntraMuscular once PRN Glucose LESS THAN 70 milligrams/deciliter      LABS:                        10.7   13.49 )-----------( 355      ( 10 Dec 2017 06:15 )             33.0     Hemoglobin: 10.7 g/dL (12-10 @ 06:15)  Hemoglobin: 10.2 g/dL (12-09 @ 05:47)  Hemoglobin: 9.7 g/dL (12-08 @ 06:00)  Hemoglobin: 9.8 g/dL (12-07 @ 06:15)  Hemoglobin: 10.2 g/dL (12-06 @ 06:00)    12-10    138  |  100  |  51<H>  ----------------------------<  239<H>  3.5   |  20<L>  |  1.95<H>    Ca    9.8      10 Dec 2017 06:15  Mg     1.9     12-10      Creatinine Trend: 1.95<--, 1.75<--, 1.59<--, 1.63<--, 1.68<--, 1.54<--           PHYSICAL EXAM  Vital Signs Last 24 Hrs  T(C): 36.5 (10 Dec 2017 15:15), Max: 36.8 (10 Dec 2017 05:26)  T(F): 97.7 (10 Dec 2017 15:15), Max: 98.3 (10 Dec 2017 05:26)  HR: 89 (10 Dec 2017 15:15) (82 - 94)  BP: 109/61 (10 Dec 2017 15:15) (109/49 - 121/54)  BP(mean): --  RR: 18 (10 Dec 2017 15:15) (18 - 18)  SpO2: 99% (10 Dec 2017 15:15) (99% - 100%)      Cardiovascular: Normal S1 S2,     No JVD, 1/6 YUSUF murmur, Peripheral pulses palpable 2+ bilaterally  Respiratory: Bibasilar crackles	  Gastrointestinal:  Soft, Non-tender, + BS	  Extremities no edema, cyanosis, clubbing B/L LE's     DIAGNOSTIC TESTING:    < from: Cardiac Cath Lab - Adult (10.13.17 @ 10:40) >  VENTRICLES: No left ventriculogram was performed.  CORONARY VESSELS: The coronary circulation is right dominant.  LM:   --  LM: Normal.  LAD:   --  Proximal LAD: There was a diffuse 60 % stenosis.  --  Mid LAD: There was a 100 % stenosis with the distal vessel being filled  via LIMA-LAD.  CX:   --  Circumflex: The vessel was small sized. Angiography showed mild  atherosclerosis withno flow limiting lesions.  --  OM1: Angiography showed mild atherosclerosis with no flow limiting  lesions.  RI:   --  Ostial ramus intermedius: There was a tubular 80 % stenosis.  There was PARUL grade 3 flow through the vessel (brisk flow).  RCA:   --  Proximal RCA: Angiography showed mild atherosclerosis with no  flow limiting lesions.  --  Mid RCA: There was a 100 % stenosis with the distal vessel being filled  via collaterals. This lesion is a chronic total occlusion.  GRAFTS:   --  Graft to the LAD: The graft was a LIMA. Graft angiography  showed minor luminal irregularities. Distal vessel angiography showed  minor luminal irregularities.  AORTA: Ascending aorta: Normal. No additional grafts visualized in  aortogram.  COMPLICATIONS: There were no complications.  DIAGNOSTIC RECOMMENDATIONS: Coronary angiogram demonstrates patent  LIMA-LAD, closed SVG grafts, and a severe stenosis in the ostial Ramus.  Will perform staged PCI to RI when Cr stable and renally optimized.  Prepared and signed by  Corie Ga M.D.  Signed 10/17/2017 13:49:15    < end of copied text >    < from: Cardiac Cath Lab - Adult (11.17.17 @ 11:48) >  PROCEDURE:  --  Intervention on ramus intermedius: drug-eluting stent.    VENTRICLES: No LV gram was performed; however, a recent echocardiogram  demonstrated normal global and regional LV function.  CORONARY VESSELS: The coronary circulation is right dominant.  LM:   --  LM: Angiography showed minor luminal irregularities with no flow  limiting lesions.  LAD:   --  Ostial LAD: There was a 100 % stenosis.  CX:   --  Circumflex: This vessel was not injected, but was visualized  during a prior cardiac catheterization.  RI:   --  Ramus intermedius: There was a 95 % stenosis.  RCA:   --  RCA: This vessel was not injected.  COMPLICATIONS: There were no complications.  DIAGNOSTIC RECOMMENDATIONS: The patient should continue with the present  medications. will add plavix 75mg po once a day/ will add ECASA 325mg po  once day.  Prepared and signed by  Roberta Quick M.D.  Signed 11/17/2017 13:16:01    < end of copied text >    < from: TTE with Doppler (w/Cont) (11.25.17 @ 12:33) >  CONCLUSIONS:  1. Mitral annular calcification, otherwise normal mitral  valve. Mild mitral regurgitation.  2. Normal left ventricular internal dimensions and wall  thicknesses.  3. Endocardium not well visualized; grossly moderate to  severe segmental left ventricularsystolic dysfunction.  Hypokinesis of the inferior, lateral, and inferolateral  walls.  Endocardial visualization enhanced with intravenous  injection of echo contrast (Definity).  No LV thrombus  seen.  4. The right ventricle is not well visualized;grossly  normal right ventricular systolic function.  ------------------------------------------------------------------------  Confirmed on  11/25/2017 - 14:44:41 by Jose Lucia M.D.    < end of copied text >    < from: CT Chest No Cont (12.01.17 @ 17:38) >  IMPRESSION:   No acute pulmonary disease.    JENA STEARNS D.O., RADIOLOGY RESIDENT  This document has been electronically signed.  MEAGAN SUMMERS M.D., ATTENDING RADIOLOGIST  This document has been electronically signed. Dec  2 2017 10:29AM    < end of copied text >    < from: Cardiac Cath Lab - Adult (12.05.17 @ 12:48) >  VENTRICLES: No LV gram was performed; however, a recent echocardiogram  demonstrated an EF of 36 %.  CORONARY VESSELS: The coronary circulation is right dominant.  LM:   --  Distal left main: Angiography showed minor luminal irregularities  with no flow limiting lesions.  LAD:   --  Mid LAD: There was a 100 % stenosis with the distal vessel being  filled via LIMA-LAD.  CX:   --  Proximal circumflex: There was a tubular 20 % stenosis.  --  Mid circumflex: Angiography showed minor luminal irregularities with no  flow limiting lesions.  --  Distal circumflex: Angiography showed minor luminal irregularities with  no flow limiting lesions.  --  OM1: The vessel was small sized. There was a discrete 60 % stenosis.  RI:   --  Ostial ramus intermedius: Angiography showed minor luminal  irregularities with no flow limiting lesions. There was no significant  restenosis in RI stent.  --  Proximal ramus intermedius: Angiography showed minor luminal  irregularities with no flow limiting lesions.  --  Mid ramus intermedius: There was a tubular 80 % stenosis. The lesion  was associated with a small filling defect consistent with thrombus.  RCA:   --  Mid RCA: There was a 100 % stenosis with the distal vessel being  filled via collaterals from the LAD.  GRAFTS:   --  Graft to the LAD: The graft was a LIMA. Graft angiography  showed minor luminal irregularities. Distal vessel angiography showed  minor luminalirregularities.  COMPLICATIONS: There were no complications.  DIAGNOSTIC RECOMMENDATIONS: Coronary angiogram demonstrates a severe  stenosis in the ramus intermedius that is the cause of the patient's  clinical presentation. Will therefore perform PCI to the RI.  INTERVENTIONAL RECOMMENDATIONS: S/p successful CORNELIA to the Ramus  Intermedius. The patient should continue with ASA and Brilinta for at  least 12 months.  Prepared and signed by  Corie Ga M.D.  Signed 12/06/2017 17:06:30    < end of copied text >    CT CHEST   < from: CT Chest No Cont (12.08.17 @ 15:52) >  IMPRESSION:   Left upper lobe 7 mm nodule unchanged from prior study. One-year   follow-up CT recommended.    < end of copied text >        ASSESSMENT/PLAN: 	  71 yr old  Female w/ PMH HTN, CKD, Hyperlipidemia, DM-II, CAD s/p 3V CABG  (LIMA to LAD, SVG to OM, SVG to PDA) at [facility] 2014, s/P CORNELIA TO RI 11/17/17 presenting with shortness of breath, cough, Hypoxic in ED with O2 sats in the 70s, fevers, Leukocytosis and elevated lactate.  CXR showed pulmonary edema in ED. EKG with new intraventricular block and STD V3,V4,V5, CE elevated c/w NSTEMI    --Pulm edema/acute decomp systolic HF, s/p diuresis, now compensated  --NSTEMI and TTE with new moderate to severe LV dysfxn --s/p LHC on 12/5 s/p CORNELIA to D Ramus c/w ASA Brilinta   --CXR 12/7 clear lungs  --CT chest  as noted above  unchanged nodule   --F/U Renal  -- started on lasix 40 IV BID monitor cr & K levels daily weights and I/O' keeping I<O's  -- PT no skilled needs

## 2017-12-10 NOTE — PROGRESS NOTE ADULT - SUBJECTIVE AND OBJECTIVE BOX
INTERVAL HPI/OVERNIGHT EVENTS: Pt seen and examined at bedside, no new complaints    MEDICATIONS  (STANDING):  aspirin enteric coated 81 milliGRAM(s) Oral daily  atorvastatin 40 milliGRAM(s) Oral at bedtime  dextrose 5%. 1000 milliLiter(s) (50 mL/Hr) IV Continuous <Continuous>  dextrose 50% Injectable 12.5 Gram(s) IV Push once  dextrose 50% Injectable 25 Gram(s) IV Push once  dextrose 50% Injectable 25 Gram(s) IV Push once  docusate sodium 100 milliGRAM(s) Oral three times a day  furosemide   Injectable 40 milliGRAM(s) IV Push daily  insulin glargine Injectable (LANTUS) 65 Unit(s) SubCutaneous at bedtime  insulin lispro (HumaLOG) corrective regimen sliding scale   SubCutaneous three times a day before meals  insulin lispro (HumaLOG) corrective regimen sliding scale   SubCutaneous at bedtime  insulin lispro Injectable (HumaLOG) 24 Unit(s) SubCutaneous three times a day before meals  levothyroxine 50 MICROGram(s) Oral daily  metoprolol succinate ER 12.5 milliGRAM(s) Oral daily  montelukast 10 milliGRAM(s) Oral at bedtime  pantoprazole    Tablet 40 milliGRAM(s) Oral two times a day before meals  polyethylene glycol 3350 17 Gram(s) Oral daily  senna 2 Tablet(s) Oral at bedtime  sucralfate suspension 1 Gram(s) Oral four times a day  ticagrelor 90 milliGRAM(s) Oral two times a day    MEDICATIONS  (PRN):  aluminum hydroxide/magnesium hydroxide/simethicone Suspension 30 milliLiter(s) Oral every 4 hours PRN Dyspepsia  dextrose Gel 1 Dose(s) Oral once PRN Blood Glucose LESS THAN 70 milliGRAM(s)/deciliter  glucagon  Injectable 1 milliGRAM(s) IntraMuscular once PRN Glucose LESS THAN 70 milligrams/deciliter  glucagon  Injectable 1 milliGRAM(s) IntraMuscular once PRN Glucose LESS THAN 70 milligrams/deciliter      Allergies    No Known Allergies    Intolerances        Review of Systems:    General:  No wt loss, fevers, chills, night sweats,fatigue,   Eyes:  Good vision, no reported pain  ENT:  No sore throat, pain, runny nose, dysphagia  CV:  No pain, palpitatioins, hypo/hypertension  Resp:  No dyspnea, cough, tachypnea, wheezing  GI:  No pain, No nausea, No vomiting, No diarrhea, No constipatiion, No weight loss, No fever, No pruritis, No rectal bleeding, No tarry stools, No dysphagia,  :  No pain, bleeding, incontinence, nocturia  Muscle:  No pain, weakness  Neuro:  No weakness, tingling, memory problems  Psych:  No fatigue, insomnia, mood problems, depression  Endocrine:  No polyuria, polydypsia, cold/heat intolerance  Heme:  No petechiae, ecchymosis, easy bruisability  Skin:  No rash, tattoos, scars, edema      Vital Signs Last 24 Hrs  T(C): 36.8 (10 Dec 2017 05:26), Max: 36.8 (10 Dec 2017 05:26)  T(F): 98.3 (10 Dec 2017 05:), Max: 98.3 (10 Dec 2017 05:26)  HR: 94 (10 Dec 2017 05:) (82 - 94)  BP: 121/54 (10 Dec 2017 05:26) (109/49 - 121/54)  BP(mean): --  RR: 18 (10 Dec 2017 05:26) (18 - 18)  SpO2: 100% (10 Dec 2017 05:26) (100% - 100%)    PHYSICAL EXAM:    Constitutional: NAD, well-developed  HEENT: EOMI, throat clear  Neck: No LAD, supple  Respiratory: CTA and P  Cardiovascular: S1 and S2, RRR, no M  Gastrointestinal: BS+, soft, NT/ND, neg HSM,  Extremities: No peripheral edema, neg clubing, cyanosis  Vascular: 2+ peripheral pulses  Neurological: A/O x 3, no focal deficits  Psychiatric: Normal mood, normal affect  Skin: No rashes      LABS:                        10.7   13.49 )-----------( 355      ( 10 Dec 2017 06:15 )             33.0     12-10    138  |  100  |  51<H>  ----------------------------<  239<H>  3.5   |  20<L>  |  1.95<H>    Ca    9.8      10 Dec 2017 06:15  Mg     1.9     12-10        Urinalysis Basic - ( 10 Dec 2017 09:50 )    Color: COLORL / Appearance: CLEAR / S.006 / pH: 5.0  Gluc: 100 / Ketone: NEGATIVE  / Bili: NEGATIVE / Urobili: NORMAL mg/dL   Blood: NEGATIVE / Protein: NEGATIVE mg/dL / Nitrite: NEGATIVE   Leuk Esterase: NEGATIVE / RBC: 0-2 / WBC 0-2   Sq Epi: OCC / Non Sq Epi: x / Bacteria: x        RADIOLOGY & ADDITIONAL TESTS:

## 2017-12-10 NOTE — PROGRESS NOTE ADULT - SUBJECTIVE AND OBJECTIVE BOX
Patient seen and examined at bedside. No chest pain/sob    VITALS:  T(F): 98.3 (12-10-17 @ 05:26), Max: 98.3 (12-10-17 @ 05:26)  HR: 94 (12-10-17 @ 05:26)  BP: 121/54 (12-10-17 @ 05:26)  RR: 18 (12-10-17 @ 05:26)  SpO2: 100% (12-10-17 @ 05:26)  Wt(kg): --        PHYSICAL EXAM:  Constitutional: NAD  Neck: No JVD  Respiratory: CTAB, no wheezes, rales or rhonchi  Cardiovascular: S1, S2, RRR  Gastrointestinal: BS+, soft, NT/ND  Extremities: No peripheral edema    Hospital Medications:   MEDICATIONS  (STANDING):  aspirin enteric coated 81 milliGRAM(s) Oral daily  atorvastatin 40 milliGRAM(s) Oral at bedtime  dextrose 5%. 1000 milliLiter(s) (50 mL/Hr) IV Continuous <Continuous>  dextrose 50% Injectable 12.5 Gram(s) IV Push once  dextrose 50% Injectable 25 Gram(s) IV Push once  dextrose 50% Injectable 25 Gram(s) IV Push once  docusate sodium 100 milliGRAM(s) Oral three times a day  furosemide   Injectable 40 milliGRAM(s) IV Push daily  insulin glargine Injectable (LANTUS) 65 Unit(s) SubCutaneous at bedtime  insulin lispro (HumaLOG) corrective regimen sliding scale   SubCutaneous three times a day before meals  insulin lispro (HumaLOG) corrective regimen sliding scale   SubCutaneous at bedtime  insulin lispro Injectable (HumaLOG) 24 Unit(s) SubCutaneous three times a day before meals  levothyroxine 50 MICROGram(s) Oral daily  metoprolol succinate ER 12.5 milliGRAM(s) Oral daily  montelukast 10 milliGRAM(s) Oral at bedtime  pantoprazole    Tablet 40 milliGRAM(s) Oral two times a day before meals  polyethylene glycol 3350 17 Gram(s) Oral daily  senna 2 Tablet(s) Oral at bedtime  sucralfate suspension 1 Gram(s) Oral four times a day  ticagrelor 90 milliGRAM(s) Oral two times a day      LABS:  12-10    138  |  100  |  51<H>  ----------------------------<  239<H>  3.5   |  20<L>  |  1.95<H>    Ca    9.8      10 Dec 2017 06:15  Mg     1.9     12-10      Creatinine Trend: 1.95 <--, 1.75 <--, 1.59 <--, 1.63 <--, 1.68 <--, 1.54 <--, 1.81 <--, 1.94 <--                              10.7   13.49 )-----------( 355      ( 10 Dec 2017 06:15 )             33.0     Urine Studies:      Urinalysis - [11-25-17 @ 01:00]      Color PLYEL / Appearance CLEAR / SG 1.020 / pH 6.0      Gluc >1000 / Ketone NEGATIVE  / Bili NEGATIVE / Urobili NORMAL       Blood TRACE / Protein 150 / Leuk Est NEGATIVE / Nitrite NEGATIVE      RBC 2-5 / WBC 10-25 / Hyaline 2-5 / Gran  / Sq Epi OCC / Non Sq Epi  / Bacteria FEW      HbA1c 9.1      [11-25-17 @ 06:30]  TSH 0.79      [11-25-17 @ 06:30]  Lipid: chol 136, , HDL 37, LDL 80      [11-25-17 @ 06:30]        RADIOLOGY & ADDITIONAL STUDIES:

## 2017-12-10 NOTE — PROGRESS NOTE ADULT - SUBJECTIVE AND OBJECTIVE BOX
Chief complaint  Patient is a 72y old  Female who presents with a chief complaint of sob,chest pain (27 Nov 2017 11:05)   Review of systems  Patient in bed, looks comfortable, no fever, no hypoglycemia.    Labs and Fingersticks    CAPILLARY BLOOD GLUCOSE        Calcium, Total Serum: 9.8 (12-10 @ 06:15)  Calcium, Total Serum: 9.2 (12-09 @ 05:47)          12-10    138  |  100  |  51<H>  ----------------------------<  239<H>  3.5   |  20<L>  |  1.95<H>    Ca    9.8      10 Dec 2017 06:15  Mg     1.9     12-10                          10.7   13.49 )-----------( 355      ( 10 Dec 2017 06:15 )             33.0     Medications  MEDICATIONS  (STANDING):  aspirin enteric coated 81 milliGRAM(s) Oral daily  atorvastatin 40 milliGRAM(s) Oral at bedtime  dextrose 5%. 1000 milliLiter(s) (50 mL/Hr) IV Continuous <Continuous>  dextrose 50% Injectable 12.5 Gram(s) IV Push once  dextrose 50% Injectable 25 Gram(s) IV Push once  dextrose 50% Injectable 25 Gram(s) IV Push once  docusate sodium 100 milliGRAM(s) Oral three times a day  furosemide   Injectable 40 milliGRAM(s) IV Push daily  insulin glargine Injectable (LANTUS) 65 Unit(s) SubCutaneous at bedtime  insulin lispro (HumaLOG) corrective regimen sliding scale   SubCutaneous three times a day before meals  insulin lispro (HumaLOG) corrective regimen sliding scale   SubCutaneous at bedtime  insulin lispro Injectable (HumaLOG) 24 Unit(s) SubCutaneous three times a day before meals  levothyroxine 50 MICROGram(s) Oral daily  metoprolol succinate ER 12.5 milliGRAM(s) Oral daily  montelukast 10 milliGRAM(s) Oral at bedtime  pantoprazole    Tablet 40 milliGRAM(s) Oral two times a day before meals  polyethylene glycol 3350 17 Gram(s) Oral daily  senna 2 Tablet(s) Oral at bedtime  sucralfate suspension 1 Gram(s) Oral four times a day  ticagrelor 90 milliGRAM(s) Oral two times a day      Physical Exam  General: Patient comfortable in bed  Vital Signs Last 12 Hrs  T(F): 97.7 (12-10-17 @ 15:15), Max: 98.3 (12-10-17 @ 05:26)  HR: 89 (12-10-17 @ 15:15) (89 - 94)  BP: 109/61 (12-10-17 @ 15:15) (109/61 - 121/54)  BP(mean): --  RR: 18 (12-10-17 @ 15:15) (18 - 18)  SpO2: 99% (12-10-17 @ 15:15) (99% - 100%)  Neck: No palpable thyroid nodules.  CVS: S1S2, No murmurs  Respiratory: No wheezing, no crepitations  GI: Abdomen soft, bowel sounds positive  Musculoskeletal: Positive edema lower extremities.   Skin: No skin rashes, no ecchymosis    Diagnostics

## 2017-12-11 VITALS
RESPIRATION RATE: 16 BRPM | OXYGEN SATURATION: 99 % | DIASTOLIC BLOOD PRESSURE: 67 MMHG | TEMPERATURE: 98 F | HEART RATE: 95 BPM | SYSTOLIC BLOOD PRESSURE: 109 MMHG

## 2017-12-11 LAB
BACTERIA UR CULT: SIGNIFICANT CHANGE UP
BASOPHILS # BLD AUTO: 0.08 K/UL — SIGNIFICANT CHANGE UP (ref 0–0.2)
BASOPHILS NFR BLD AUTO: 0.6 % — SIGNIFICANT CHANGE UP (ref 0–2)
BUN SERPL-MCNC: 46 MG/DL — HIGH (ref 7–23)
CALCIUM SERPL-MCNC: 9.3 MG/DL — SIGNIFICANT CHANGE UP (ref 8.4–10.5)
CHLORIDE SERPL-SCNC: 102 MMOL/L — SIGNIFICANT CHANGE UP (ref 98–107)
CO2 SERPL-SCNC: 22 MMOL/L — SIGNIFICANT CHANGE UP (ref 22–31)
CREAT SERPL-MCNC: 1.84 MG/DL — HIGH (ref 0.5–1.3)
EOSINOPHIL # BLD AUTO: 0.39 K/UL — SIGNIFICANT CHANGE UP (ref 0–0.5)
EOSINOPHIL NFR BLD AUTO: 3.1 % — SIGNIFICANT CHANGE UP (ref 0–6)
GLUCOSE BLDC GLUCOMTR-MCNC: 211 MG/DL — HIGH (ref 70–99)
GLUCOSE BLDC GLUCOMTR-MCNC: 232 MG/DL — HIGH (ref 70–99)
GLUCOSE BLDC GLUCOMTR-MCNC: 289 MG/DL — HIGH (ref 70–99)
GLUCOSE SERPL-MCNC: 181 MG/DL — HIGH (ref 70–99)
HCT VFR BLD CALC: 34.8 % — SIGNIFICANT CHANGE UP (ref 34.5–45)
HCT VFR BLD CALC: 34.8 % — SIGNIFICANT CHANGE UP (ref 34.5–45)
HGB BLD-MCNC: 11.1 G/DL — LOW (ref 11.5–15.5)
HGB BLD-MCNC: 11.1 G/DL — LOW (ref 11.5–15.5)
IMM GRANULOCYTES # BLD AUTO: 0.1 # — SIGNIFICANT CHANGE UP
IMM GRANULOCYTES NFR BLD AUTO: 0.8 % — SIGNIFICANT CHANGE UP (ref 0–1.5)
LYMPHOCYTES # BLD AUTO: 26.9 % — SIGNIFICANT CHANGE UP (ref 13–44)
LYMPHOCYTES # BLD AUTO: 3.41 K/UL — HIGH (ref 1–3.3)
MAGNESIUM SERPL-MCNC: 2 MG/DL — SIGNIFICANT CHANGE UP (ref 1.6–2.6)
MCHC RBC-ENTMCNC: 27.8 PG — SIGNIFICANT CHANGE UP (ref 27–34)
MCHC RBC-ENTMCNC: 27.8 PG — SIGNIFICANT CHANGE UP (ref 27–34)
MCHC RBC-ENTMCNC: 31.9 % — LOW (ref 32–36)
MCHC RBC-ENTMCNC: 31.9 % — LOW (ref 32–36)
MCV RBC AUTO: 87.2 FL — SIGNIFICANT CHANGE UP (ref 80–100)
MCV RBC AUTO: 87.2 FL — SIGNIFICANT CHANGE UP (ref 80–100)
MONOCYTES # BLD AUTO: 0.85 K/UL — SIGNIFICANT CHANGE UP (ref 0–0.9)
MONOCYTES NFR BLD AUTO: 6.7 % — SIGNIFICANT CHANGE UP (ref 2–14)
NEUTROPHILS # BLD AUTO: 7.84 K/UL — HIGH (ref 1.8–7.4)
NEUTROPHILS NFR BLD AUTO: 61.9 % — SIGNIFICANT CHANGE UP (ref 43–77)
NRBC # FLD: 0 — SIGNIFICANT CHANGE UP
NRBC # FLD: 0 — SIGNIFICANT CHANGE UP
PLATELET # BLD AUTO: 374 K/UL — SIGNIFICANT CHANGE UP (ref 150–400)
PLATELET # BLD AUTO: 374 K/UL — SIGNIFICANT CHANGE UP (ref 150–400)
PMV BLD: 9.1 FL — SIGNIFICANT CHANGE UP (ref 7–13)
PMV BLD: 9.1 FL — SIGNIFICANT CHANGE UP (ref 7–13)
POTASSIUM SERPL-MCNC: 3.3 MMOL/L — LOW (ref 3.5–5.3)
POTASSIUM SERPL-SCNC: 3.3 MMOL/L — LOW (ref 3.5–5.3)
RBC # BLD: 3.99 M/UL — SIGNIFICANT CHANGE UP (ref 3.8–5.2)
RBC # BLD: 3.99 M/UL — SIGNIFICANT CHANGE UP (ref 3.8–5.2)
RBC # FLD: 13.3 % — SIGNIFICANT CHANGE UP (ref 10.3–14.5)
RBC # FLD: 13.3 % — SIGNIFICANT CHANGE UP (ref 10.3–14.5)
SODIUM SERPL-SCNC: 141 MMOL/L — SIGNIFICANT CHANGE UP (ref 135–145)
SPECIMEN SOURCE: SIGNIFICANT CHANGE UP
WBC # BLD: 12.67 K/UL — HIGH (ref 3.8–10.5)
WBC # BLD: 12.67 K/UL — HIGH (ref 3.8–10.5)
WBC # FLD AUTO: 12.67 K/UL — HIGH (ref 3.8–10.5)
WBC # FLD AUTO: 12.67 K/UL — HIGH (ref 3.8–10.5)

## 2017-12-11 RX ORDER — INSULIN GLULISINE 100 [IU]/ML
26 INJECTION, SOLUTION SUBCUTANEOUS
Qty: 0 | Refills: 0 | COMMUNITY

## 2017-12-11 RX ORDER — FUROSEMIDE 40 MG
1 TABLET ORAL
Qty: 0 | Refills: 0 | COMMUNITY
Start: 2017-12-11

## 2017-12-11 RX ORDER — TICAGRELOR 90 MG/1
1 TABLET ORAL
Qty: 0 | Refills: 0 | COMMUNITY
Start: 2017-12-11

## 2017-12-11 RX ORDER — INSULIN GLULISINE 100 [IU]/ML
24 INJECTION, SOLUTION SUBCUTANEOUS
Qty: 0 | Refills: 0 | COMMUNITY

## 2017-12-11 RX ORDER — FUROSEMIDE 40 MG
40 TABLET ORAL DAILY
Qty: 0 | Refills: 0 | Status: DISCONTINUED | OUTPATIENT
Start: 2017-12-12 | End: 2017-12-11

## 2017-12-11 RX ORDER — POTASSIUM CHLORIDE 20 MEQ
40 PACKET (EA) ORAL ONCE
Qty: 0 | Refills: 0 | Status: COMPLETED | OUTPATIENT
Start: 2017-12-11 | End: 2017-12-11

## 2017-12-11 RX ORDER — TICAGRELOR 90 MG/1
1 TABLET ORAL
Qty: 60 | Refills: 0 | OUTPATIENT
Start: 2017-12-11 | End: 2018-01-09

## 2017-12-11 RX ORDER — METOPROLOL TARTRATE 50 MG
0.5 TABLET ORAL
Qty: 0 | Refills: 0 | COMMUNITY

## 2017-12-11 RX ORDER — FUROSEMIDE 40 MG
1 TABLET ORAL
Qty: 0 | Refills: 0 | COMMUNITY

## 2017-12-11 RX ORDER — FUROSEMIDE 40 MG
1 TABLET ORAL
Qty: 30 | Refills: 0 | OUTPATIENT
Start: 2017-12-11 | End: 2018-01-09

## 2017-12-11 RX ORDER — METOPROLOL TARTRATE 50 MG
0.5 TABLET ORAL
Qty: 15 | Refills: 0 | OUTPATIENT
Start: 2017-12-11 | End: 2018-01-09

## 2017-12-11 RX ADMIN — Medication 4: at 08:33

## 2017-12-11 RX ADMIN — Medication 81 MILLIGRAM(S): at 14:20

## 2017-12-11 RX ADMIN — Medication 12.5 MILLIGRAM(S): at 05:27

## 2017-12-11 RX ADMIN — PANTOPRAZOLE SODIUM 40 MILLIGRAM(S): 20 TABLET, DELAYED RELEASE ORAL at 05:27

## 2017-12-11 RX ADMIN — Medication 100 MILLIGRAM(S): at 05:27

## 2017-12-11 RX ADMIN — Medication 1 GRAM(S): at 05:27

## 2017-12-11 RX ADMIN — TICAGRELOR 90 MILLIGRAM(S): 90 TABLET ORAL at 05:28

## 2017-12-11 RX ADMIN — Medication 24 UNIT(S): at 14:19

## 2017-12-11 RX ADMIN — PANTOPRAZOLE SODIUM 40 MILLIGRAM(S): 20 TABLET, DELAYED RELEASE ORAL at 18:08

## 2017-12-11 RX ADMIN — Medication 50 MICROGRAM(S): at 05:27

## 2017-12-11 RX ADMIN — Medication 1 GRAM(S): at 14:19

## 2017-12-11 RX ADMIN — Medication 1 GRAM(S): at 18:08

## 2017-12-11 RX ADMIN — Medication 40 MILLIEQUIVALENT(S): at 09:27

## 2017-12-11 RX ADMIN — Medication 24 UNIT(S): at 18:07

## 2017-12-11 RX ADMIN — Medication 4: at 18:07

## 2017-12-11 RX ADMIN — Medication 40 MILLIGRAM(S): at 05:27

## 2017-12-11 RX ADMIN — Medication 24 UNIT(S): at 08:33

## 2017-12-11 RX ADMIN — TICAGRELOR 90 MILLIGRAM(S): 90 TABLET ORAL at 18:07

## 2017-12-11 RX ADMIN — Medication 6: at 14:19

## 2017-12-11 RX ADMIN — Medication 1 GRAM(S): at 02:03

## 2017-12-11 NOTE — PROGRESS NOTE ADULT - ATTENDING COMMENTS
Patient seen and examined, agree with above assessment and plan as transcribed above.    - For cath monday    Vargas Adame MD, [facility]  [facility], [facility]  [address] [place] [address]  PHONE:  [phone]  BEEPER : [phone]
Patient seen and examined, agree with above assessment and plan as transcribed above.    - For cath monday  - renal protective measures per renal    Vargas Adame MD, FACC  [facility], [facility]  [address] [place] [address]  PHONE:  [phone]  BEEPER : [phone]
Patient seen and examined.  Agree with above.   -Check CXR  -replete lytes  -check chem-7 after repletion of K  -f/u renal    Corie Ga MD  [facility]  [address] [place] [address], [address] [place] [address]  office: [phone]  pager: [phone]
Patient seen and examined.  Agree with above.   -Continue with IV diuresis  -Monitor renal function  -Eventual cath when medically optimized    Corie Ga MD  [facility]  [address] [facility], [address]  [place] [address]  office: [phone]  pager: [phone]
Patient seen and examined.  Agree with above.   -Continue with IV lasix  -dc home when euvolemic    Corie Ga MD  [facility]  [address] [facility], [address]  [place] [address]  office: [phone]  pager: [phone]
Patient seen and examined.  Agree with above.   -IV lasix changed to PO  -monitor cr  -cath when medically optimized    Corie Ga MD  [facility]  [address] [place] [address], [address]  [place] [address]  office: [phone]  pager: [phone]
Patient seen and examined.  Agree with above.   -Plan for cath tomorrow if cr stable    Corie Ga MD  [facility]  [address] [facility], [address]  [place] [address]  office: [phone]  pager: [phone]
Patient seen and examined.  Agree with above.   -Pt. complains of sob today  -Will give IV lasix and assess for improvement  -check CXR  -pulm eval with Dr. Rony Ga MD  [facility]  [address] [place] [address], [address]  [place] [address]  office: [phone]  pager: [phone]
Patient seen and examined.  Agree with above.   -Pt. diuresed well on lasix gtt, now changed to IV lasix BID  -Continue with IV diuresis to keep O > I  -Pt. still volume up and cannot lie flat  -TTE noted  -Continue with hep gtt  -Continue with DAPT  -Continue with ccu care  -f/u BCx  -Monitoring off abx per ID    Corie Ga MD  [facility]  [address] [facility], [address]  [place] [address]  office: [phone]  pager: [phone]
Patient seen and examined.  Agree with above.   -Pt. reports improvement in sob after IV lasix  -Continue with IV lasix for now  -non-con ct chest with no PNA  -f/u renal  -continue with dapt for recent darrian    Corie Ga MD  [facility]  [address] [facility], [address]  [place] [address]  office: [phone]  pager: [phone]
Patient seen and examined.  Agree with above.   -Pt. symptoms have resolved after IV lasix  -monitor i/o , cr  -continue with dapt  -f/u renal    Corie Ga MD  [facility]  [address] [place] [address], [address]  [place] [address]  office: [phone]  pager: [phone]
Patient seen and examined.  Agree with above.   -SOB resolved and patient appears euvolemic  -change to po lasix  -dc planning      Corie Ga MD  [facility]  [address] [place] [address], [address]  [place] [address]  office: [phone]  pager: [phone]
Patient seen and examined.  Agree with above.   -Volume status improving  -Continue with IV lasix if ok with renal  -f/u ID  -DAPT for recent darrian  -eventual cath when medically optimized    Corie Ga MD  [facility]  [address] [place] [address], [address]  [place] [address]  office: [phone]  pager: [phone]
Patient seen and examined.  Agree with above.   -f/u renal  -will plan on cath when medically optimized    Corie Ga MD  [facility]  [address] [facility], [address]  [place] [address]  office: [phone]  pager: [phone]
Patient seen and examined.  Agree with above.   -pt. s/p cardiac cath showing severe stenosis in the RI distal to prior stent now s/p CORNELIA to RI  -continue with dapt - Plavix changed to brilinta  -follow up renal    Corie Ga MD  [facility]  [address] [place] [address], [address]  [place] [address]  office: [phone]  pager: [phone]
Agree with above assessment and plan as outlined above.    - For cath once crt stabilizes    Vargas Adame MD, FACC  [facility], [facility]  [address] [place] [address]  PHONE:  [phone]  BEEPER : [phone]

## 2017-12-11 NOTE — PROGRESS NOTE ADULT - SUBJECTIVE AND OBJECTIVE BOX
Chief complaint  Patient is a 72y old  Female who presents with a chief complaint of sob,chest pain (27 Nov 2017 11:05)   Review of systems  Patient in bed, looks comfortable, no fever, no hypoglycemia.    Labs and Fingersticks    CAPILLARY BLOOD GLUCOSE        Calcium, Total Serum: 9.3 (12-11 @ 06:00)  Calcium, Total Serum: 9.8 (12-10 @ 06:15)          12-11    141  |  102  |  46<H>  ----------------------------<  181<H>  3.3<L>   |  22  |  1.84<H>    Ca    9.3      11 Dec 2017 06:00  Mg     2.0     12-11                          11.1   12.67 )-----------( 374      ( 11 Dec 2017 06:00 )             34.8     Medications  MEDICATIONS  (STANDING):  aspirin enteric coated 81 milliGRAM(s) Oral daily  atorvastatin 40 milliGRAM(s) Oral at bedtime  dextrose 5%. 1000 milliLiter(s) (50 mL/Hr) IV Continuous <Continuous>  dextrose 50% Injectable 12.5 Gram(s) IV Push once  dextrose 50% Injectable 25 Gram(s) IV Push once  dextrose 50% Injectable 25 Gram(s) IV Push once  docusate sodium 100 milliGRAM(s) Oral three times a day  furosemide   Injectable 40 milliGRAM(s) IV Push daily  insulin glargine Injectable (LANTUS) 65 Unit(s) SubCutaneous at bedtime  insulin lispro (HumaLOG) corrective regimen sliding scale   SubCutaneous three times a day before meals  insulin lispro (HumaLOG) corrective regimen sliding scale   SubCutaneous at bedtime  insulin lispro Injectable (HumaLOG) 24 Unit(s) SubCutaneous three times a day before meals  levothyroxine 50 MICROGram(s) Oral daily  metoprolol succinate ER 12.5 milliGRAM(s) Oral daily  montelukast 10 milliGRAM(s) Oral at bedtime  pantoprazole    Tablet 40 milliGRAM(s) Oral two times a day before meals  polyethylene glycol 3350 17 Gram(s) Oral daily  senna 2 Tablet(s) Oral at bedtime  sucralfate suspension 1 Gram(s) Oral four times a day  ticagrelor 90 milliGRAM(s) Oral two times a day      Physical Exam  General: Patient comfortable in bed  Vital Signs Last 12 Hrs  T(F): 98.2 (12-11-17 @ 05:05), Max: 98.2 (12-11-17 @ 05:05)  HR: 75 (12-11-17 @ 05:05) (75 - 75)  BP: 116/68 (12-11-17 @ 05:05) (116/68 - 116/68)  BP(mean): --  RR: 18 (12-11-17 @ 05:05) (18 - 18)  SpO2: 100% (12-11-17 @ 05:05) (100% - 100%)  Neck: No palpable thyroid nodules.  CVS: S1S2, No murmurs  Respiratory: No wheezing, no crepitations  GI: Abdomen soft, bowel sounds positive  Musculoskeletal: Positive edema lower extremities.   Skin: No skin rashes, no ecchymosis    Diagnostics

## 2017-12-11 NOTE — PROGRESS NOTE ADULT - SUBJECTIVE AND OBJECTIVE BOX
Dr. Muhammad (Nephrology)  Office [phone]  Cell [phone]  [facility] FIELD  Cell [phone]      Patient is a 72y old  Female who presents with a chief complaint of sob,chest pain (27 Nov 2017 11:05)      Patient seen and examined at bedside. No chest pain/sob    VITALS:  T(F): 98.2 (12-11-17 @ 05:05), Max: 98.2 (12-11-17 @ 05:05)  HR: 75 (12-11-17 @ 05:05)  BP: 116/68 (12-11-17 @ 05:05)  RR: 18 (12-11-17 @ 05:05)  SpO2: 100% (12-11-17 @ 05:05)  Wt(kg): --        PHYSICAL EXAM:  Constitutional: NAD  Neck: No JVD  Respiratory: CTAB, no wheezes, rales or rhonchi  Cardiovascular: S1, S2, RRR  Gastrointestinal: BS+, soft, NT/ND  Extremities: No peripheral edema    Hospital Medications:   MEDICATIONS  (STANDING):  aspirin enteric coated 81 milliGRAM(s) Oral daily  atorvastatin 40 milliGRAM(s) Oral at bedtime  dextrose 5%. 1000 milliLiter(s) (50 mL/Hr) IV Continuous <Continuous>  dextrose 50% Injectable 12.5 Gram(s) IV Push once  dextrose 50% Injectable 25 Gram(s) IV Push once  dextrose 50% Injectable 25 Gram(s) IV Push once  docusate sodium 100 milliGRAM(s) Oral three times a day  furosemide   Injectable 40 milliGRAM(s) IV Push daily  insulin glargine Injectable (LANTUS) 65 Unit(s) SubCutaneous at bedtime  insulin lispro (HumaLOG) corrective regimen sliding scale   SubCutaneous three times a day before meals  insulin lispro (HumaLOG) corrective regimen sliding scale   SubCutaneous at bedtime  insulin lispro Injectable (HumaLOG) 24 Unit(s) SubCutaneous three times a day before meals  levothyroxine 50 MICROGram(s) Oral daily  metoprolol succinate ER 12.5 milliGRAM(s) Oral daily  montelukast 10 milliGRAM(s) Oral at bedtime  pantoprazole    Tablet 40 milliGRAM(s) Oral two times a day before meals  polyethylene glycol 3350 17 Gram(s) Oral daily  senna 2 Tablet(s) Oral at bedtime  sucralfate suspension 1 Gram(s) Oral four times a day  ticagrelor 90 milliGRAM(s) Oral two times a day      LABS:  12-11    141  |  102  |  46<H>  ----------------------------<  181<H>  3.3<L>   |  22  |  1.84<H>    Ca    9.3      11 Dec 2017 06:00  Mg     2.0     12-11      Creatinine Trend: 1.84 <--, 1.95 <--, 1.75 <--, 1.59 <--, 1.63 <--, 1.68 <--, 1.54 <--, 1.81 <--                                11.1   12.67 )-----------( 374      ( 11 Dec 2017 06:00 )             34.8     Urine Studies:  Urinalysis - [12-10-17 @ 09:50]      Color COLORL / Appearance CLEAR / SG 1.006 / pH 5.0      Gluc 100 / Ketone NEGATIVE  / Bili NEGATIVE / Urobili NORMAL       Blood NEGATIVE / Protein NEGATIVE / Leuk Est NEGATIVE / Nitrite NEGATIVE      RBC 0-2 / WBC 0-2 / Hyaline  / Gran  / Sq Epi OCC / Non Sq Epi  / Bacteria       HbA1c 9.1      [11-25-17 @ 06:30]  TSH 0.79      [11-25-17 @ 06:30]  Lipid: chol 136, , HDL 37, LDL 80      [11-25-17 @ 06:30]        RADIOLOGY & ADDITIONAL STUDIES:

## 2017-12-11 NOTE — PROGRESS NOTE ADULT - SUBJECTIVE AND OBJECTIVE BOX
Subjective: No CP, or SOB today     MEDICATIONS  (STANDING):  aspirin enteric coated 81 milliGRAM(s) Oral daily  atorvastatin 40 milliGRAM(s) Oral at bedtime  docusate sodium 100 milliGRAM(s) Oral three times a day  insulin glargine Injectable (LANTUS) 65 Unit(s) SubCutaneous at bedtime  insulin lispro (HumaLOG) corrective regimen sliding scale   SubCutaneous three times a day before meals  insulin lispro (HumaLOG) corrective regimen sliding scale   SubCutaneous at bedtime  insulin lispro Injectable (HumaLOG) 24 Unit(s) SubCutaneous three times a day before meals  levothyroxine 50 MICROGram(s) Oral daily  metoprolol succinate ER 12.5 milliGRAM(s) Oral daily  montelukast 10 milliGRAM(s) Oral at bedtime  pantoprazole    Tablet 40 milliGRAM(s) Oral two times a day before meals  polyethylene glycol 3350 17 Gram(s) Oral daily  senna 2 Tablet(s) Oral at bedtime  sucralfate suspension 1 Gram(s) Oral four times a day  ticagrelor 90 milliGRAM(s) Oral two times a day    LABS:                        11.1   12.67 )-----------( 374      ( 11 Dec 2017 06:00 )             34.8     141  |  102  |  46<H>  ----------------------------<  181<H>  3.3<L>   |  22  |  1.84<H>    Ca    9.3      11 Dec 2017 06:00  Mg     2.0     12-11    Creatinine Trend: 1.84<--, 1.95<--, 1.75<--, 1.59<--, 1.63<--, 1.68<--     PHYSICAL EXAM  Vital Signs Last 24 Hrs  T(C): 36.6 (11 Dec 2017 14:52), Max: 36.8 (11 Dec 2017 05:05)  T(F): 97.9 (11 Dec 2017 14:52), Max: 98.2 (11 Dec 2017 05:05)  HR: 95 (11 Dec 2017 14:52) (75 - 95)  BP: 109/67 (11 Dec 2017 14:52) (109/67 - 118/52)  RR: 16 (11 Dec 2017 14:52) (16 - 18)  SpO2: 99% (11 Dec 2017 14:52) (99% - 100%)    Cardiovascular: Normal S1 S2,     No JVD, 1/6 YUSUF murmur, Peripheral pulses palpable 2+ bilaterally  Respiratory: Bibasilar crackles	  Gastrointestinal:  Soft, Non-tender, + BS	  Extremities no edema, cyanosis, clubbing B/L LE's     DIAGNOSTIC TESTING:    < from: Cardiac Cath Lab - Adult (10.13.17 @ 10:40) >  VENTRICLES: No left ventriculogram was performed.  CORONARY VESSELS: The coronary circulation is right dominant.  LM:   --  LM: Normal.  LAD:   --  Proximal LAD: There was a diffuse 60 % stenosis.  --  Mid LAD: There was a 100 % stenosis with the distal vessel being filled  via LIMA-LAD.  CX:   --  Circumflex: The vessel was small sized. Angiography showed mild  atherosclerosis withno flow limiting lesions.  --  OM1: Angiography showed mild atherosclerosis with no flow limiting  lesions.  RI:   --  Ostial ramus intermedius: There was a tubular 80 % stenosis.  There was PARUL grade 3 flow through the vessel (brisk flow).  RCA:   --  Proximal RCA: Angiography showed mild atherosclerosis with no  flow limiting lesions.  --  Mid RCA: There was a 100 % stenosis with the distal vessel being filled  via collaterals. This lesion is a chronic total occlusion.  GRAFTS:   --  Graft to the LAD: The graft was a LIMA. Graft angiography  showed minor luminal irregularities. Distal vessel angiography showed  minor luminal irregularities.  AORTA: Ascending aorta: Normal. No additional grafts visualized in  aortogram.  COMPLICATIONS: There were no complications.  DIAGNOSTIC RECOMMENDATIONS: Coronary angiogram demonstrates patent  LIMA-LAD, closed SVG grafts, and a severe stenosis in the ostial Ramus.  Will perform staged PCI to RI when Cr stable and renally optimized.  Prepared and signed by  Corie Ga M.D.  Signed 10/17/2017 13:49:15    < end of copied text >    < from: Cardiac Cath Lab - Adult (11.17.17 @ 11:48) >  PROCEDURE:  --  Intervention on ramus intermedius: drug-eluting stent.    VENTRICLES: No LV gram was performed; however, a recent echocardiogram  demonstrated normal global and regional LV function.  CORONARY VESSELS: The coronary circulation is right dominant.  LM:   --  LM: Angiography showed minor luminal irregularities with no flow  limiting lesions.  LAD:   --  Ostial LAD: There was a 100 % stenosis.  CX:   --  Circumflex: This vessel was not injected, but was visualized  during a prior cardiac catheterization.  RI:   --  Ramus intermedius: There was a 95 % stenosis.  RCA:   --  RCA: This vessel was not injected.  COMPLICATIONS: There were no complications.  DIAGNOSTIC RECOMMENDATIONS: The patient should continue with the present  medications. will add plavix 75mg po once a day/ will add ECASA 325mg po  once day.  Prepared and signed by  Roberta Quick M.D.  Signed 11/17/2017 13:16:01    < end of copied text >    < from: TTE with Doppler (w/Cont) (11.25.17 @ 12:33) >  CONCLUSIONS:  1. Mitral annular calcification, otherwise normal mitral  valve. Mild mitral regurgitation.  2. Normal left ventricular internal dimensions and wall  thicknesses.  3. Endocardium not well visualized; grossly moderate to  severe segmental left ventricularsystolic dysfunction.  Hypokinesis of the inferior, lateral, and inferolateral  walls.  Endocardial visualization enhanced with intravenous  injection of echo contrast (Definity).  No LV thrombus  seen.  4. The right ventricle is not well visualized;grossly  normal right ventricular systolic function.  ------------------------------------------------------------------------  Confirmed on  11/25/2017 - 14:44:41 by Jose Lucia M.D.    < end of copied text >    < from: CT Chest No Cont (12.01.17 @ 17:38) >  IMPRESSION:   No acute pulmonary disease.    JENA STEARNS D.O., RADIOLOGY RESIDENT  This document has been electronically signed.  MEAGAN SUMMERS M.D., ATTENDING RADIOLOGIST  This document has been electronically signed. Dec  2 2017 10:29AM    < end of copied text >    < from: Cardiac Cath Lab - Adult (12.05.17 @ 12:48) >  VENTRICLES: No LV gram was performed; however, a recent echocardiogram  demonstrated an EF of 36 %.  CORONARY VESSELS: The coronary circulation is right dominant.  LM:   --  Distal left main: Angiography showed minor luminal irregularities  with no flow limiting lesions.  LAD:   --  Mid LAD: There was a 100 % stenosis with the distal vessel being  filled via LIMA-LAD.  CX:   --  Proximal circumflex: There was a tubular 20 % stenosis.  --  Mid circumflex: Angiography showed minor luminal irregularities with no  flow limiting lesions.  --  Distal circumflex: Angiography showed minor luminal irregularities with  no flow limiting lesions.  --  OM1: The vessel was small sized. There was a discrete 60 % stenosis.  RI:   --  Ostial ramus intermedius: Angiography showed minor luminal  irregularities with no flow limiting lesions. There was no significant  restenosis in RI stent.  --  Proximal ramus intermedius: Angiography showed minor luminal  irregularities with no flow limiting lesions.  --  Mid ramus intermedius: There was a tubular 80 % stenosis. The lesion  was associated with a small filling defect consistent with thrombus.  RCA:   --  Mid RCA: There was a 100 % stenosis with the distal vessel being  filled via collaterals from the LAD.  GRAFTS:   --  Graft to the LAD: The graft was a LIMA. Graft angiography  showed minor luminal irregularities. Distal vessel angiography showed  minor luminalirregularities.  COMPLICATIONS: There were no complications.  DIAGNOSTIC RECOMMENDATIONS: Coronary angiogram demonstrates a severe  stenosis in the ramus intermedius that is the cause of the patient's  clinical presentation. Will therefore perform PCI to the RI.  INTERVENTIONAL RECOMMENDATIONS: S/p successful CORNELIA to the Ramus  Intermedius. The patient should continue with ASA and Brilinta for at  least 12 months.  Prepared and signed by  Corie Ga M.D.  Signed 12/06/2017 17:06:30    < end of copied text >      < from: CT Chest No Cont (12.08.17 @ 15:52) >  IMPRESSION:   Left upper lobe 7 mm nodule unchanged from prior study. One-year   follow-up CT recommended.    < end of copied text >        ASSESSMENT/PLAN: 	  71 yr old  Female w/ PMH HTN, CKD, Hyperlipidemia, DM-II, CAD s/p 3V CABG  (LIMA to LAD, SVG to OM, SVG to PDA) at [facility] 2014, s/P CORNELIA TO RI 11/17/17 presenting with shortness of breath, cough, Hypoxic in ED with O2 sats in the 70s, fevers, Leukocytosis and elevated lactate.  CXR showed pulmonary edema in ED. EKG with new intraventricular block and STD V3,V4,V5, CE elevated c/w NSTEMI, s/p LHC on 12/5 s/p CORNELIA to D Ramus    --Cont Asa and Brilinta  --f/u Dr Quick in 1 week  --CHF compensated, change lasix to 40 PO WD  --DC planning today    Juliane Fitch PA-C  [facility]  [address]   [place] [address]  office [phone]  pager [phone]

## 2017-12-11 NOTE — PROGRESS NOTE ADULT - SUBJECTIVE AND OBJECTIVE BOX
INTERVAL HPI/OVERNIGHT EVENTS:  no complaints   denies blood in stools    HPI:  71F Nepali speaking female PMH HTN, CKD, Hyperlipidemia, DM-II, CAD s/p CABG, s/p stents presenting with shortness of breath. Symptoms started 3 days ago with dyspnea on exertion, associated with chest pain, progressing and worsening to dyspnea at rest. Endorses non-productive cough and chills. Denies  N/V/D/C, abd pain, fevers, sick exposure   In the ED pt with O2 sats in the 70s - given nitro and placed on Bipap. CXR showing pulmonary edema. EKG with  new intraventricular block and STD V3,V4,V5, Troponin 4.26. , MB 14.54. given ASA 162mg x1 and heparin gtt started. Also Noted to have Temp 100.5,  WBC 21.10, lactate 4.8.. Received vanc, cefepime, azithro .   Vital signs: max 100.5F, HR , -174/, SpO2 100% BiPAP on 40% FiO2.     Patient with recent admission to [facility] for staged CORNELIA to ramus intermedius, (90% stenosis) Discharged on 17. (2017 03:04)    MEDICATIONS  (STANDING):  aspirin enteric coated 81 milliGRAM(s) Oral daily  atorvastatin 40 milliGRAM(s) Oral at bedtime  dextrose 5%. 1000 milliLiter(s) (50 mL/Hr) IV Continuous <Continuous>  dextrose 50% Injectable 12.5 Gram(s) IV Push once  dextrose 50% Injectable 25 Gram(s) IV Push once  dextrose 50% Injectable 25 Gram(s) IV Push once  docusate sodium 100 milliGRAM(s) Oral three times a day  insulin glargine Injectable (LANTUS) 65 Unit(s) SubCutaneous at bedtime  insulin lispro (HumaLOG) corrective regimen sliding scale   SubCutaneous three times a day before meals  insulin lispro (HumaLOG) corrective regimen sliding scale   SubCutaneous at bedtime  insulin lispro Injectable (HumaLOG) 24 Unit(s) SubCutaneous three times a day before meals  levothyroxine 50 MICROGram(s) Oral daily  metoprolol succinate ER 12.5 milliGRAM(s) Oral daily  montelukast 10 milliGRAM(s) Oral at bedtime  pantoprazole    Tablet 40 milliGRAM(s) Oral two times a day before meals  polyethylene glycol 3350 17 Gram(s) Oral daily  senna 2 Tablet(s) Oral at bedtime  sucralfate suspension 1 Gram(s) Oral four times a day  ticagrelor 90 milliGRAM(s) Oral two times a day    MEDICATIONS  (PRN):  aluminum hydroxide/magnesium hydroxide/simethicone Suspension 30 milliLiter(s) Oral every 4 hours PRN Dyspepsia  dextrose Gel 1 Dose(s) Oral once PRN Blood Glucose LESS THAN 70 milliGRAM(s)/deciliter  glucagon  Injectable 1 milliGRAM(s) IntraMuscular once PRN Glucose LESS THAN 70 milligrams/deciliter  glucagon  Injectable 1 milliGRAM(s) IntraMuscular once PRN Glucose LESS THAN 70 milligrams/deciliter      Allergies    No Known Allergies    Intolerances          General:  No wt loss, fevers, chills, night sweats, fatigue,   Eyes:  Good vision, no reported pain  ENT:  No sore throat, pain, runny nose, dysphagia  CV:  No pain, palpitations, hypo/hypertension  Resp:  No dyspnea, cough, tachypnea, wheezing  GI:  No pain, No nausea, No vomiting, No diarrhea, No constipation, No weight loss, No fever, No pruritis, No rectal bleeding, No tarry stools, No dysphagia,  :  No pain, bleeding, incontinence, nocturia  Muscle:  No pain, weakness  Neuro:  No weakness, tingling, memory problems  Psych:  No fatigue, insomnia, mood problems, depression  Endocrine:  No polyuria, polydipsia, cold/heat intolerance  Heme:  No petechiae, ecchymosis, easy bruisability  Skin:  No rash, tattoos, scars, edema      PHYSICAL EXAM:   Vital Signs:  Vital Signs Last 24 Hrs  T(C): 36.6 (11 Dec 2017 14:52), Max: 36.8 (11 Dec 2017 05:05)  T(F): 97.9 (11 Dec 2017 14:52), Max: 98.2 (11 Dec 2017 05:05)  HR: 95 (11 Dec 2017 14:52) (75 - 95)  BP: 109/67 (11 Dec 2017 14:52) (109/67 - 118/52)  BP(mean): --  RR: 16 (11 Dec 2017 14:52) (16 - 18)  SpO2: 99% (11 Dec 2017 14:52) (99% - 100%)  Daily     Daily Weight in k.8 (11 Dec 2017 07:27)I&O's Summary      GENERAL:  Appears stated age, well-groomed, well-nourished, no distress  HEENT:  NC/AT,  conjunctivae clear and pink, no thyromegaly, nodules, adenopathy, no JVD, sclera -anicteric  CHEST:  Full & symmetric excursion, no increased effort, breath sounds clear  HEART:  Regular rhythm, S1, S2, no murmur/rub/S3/S4, no abdominal bruit, no edema  ABDOMEN:  Soft, non-tender, non-distended, normoactive bowel sounds,  no masses ,no hepato-splenomegaly, no signs of chronic liver disease  EXTEREMITIES:  no cyanosis,clubbing or edema  SKIN:  No rash/erythema/ecchymoses/petechiae/wounds/abscess/warm/dry  NEURO:  Alert, oriented, no asterixis, no tremor, no encephalopathy      LABS:                        11.1   12.67 )-----------( 374      ( 11 Dec 2017 06:00 )             34.8     12-11    141  |  102  |  46<H>  ----------------------------<  181<H>  3.3<L>   |  22  |  1.84<H>    Ca    9.3      11 Dec 2017 06:00  Mg     2.0     12-11        Urinalysis Basic - ( 10 Dec 2017 09:50 )    Color: COLORL / Appearance: CLEAR / S.006 / pH: 5.0  Gluc: 100 / Ketone: NEGATIVE  / Bili: NEGATIVE / Urobili: NORMAL mg/dL   Blood: NEGATIVE / Protein: NEGATIVE mg/dL / Nitrite: NEGATIVE   Leuk Esterase: NEGATIVE / RBC: 0-2 / WBC 0-2   Sq Epi: OCC / Non Sq Epi: x / Bacteria: x      amylase   lipase  RADIOLOGY & ADDITIONAL TESTS:

## 2017-12-11 NOTE — PROGRESS NOTE ADULT - PROBLEM SELECTOR PLAN 1
- NSTEMI and TTE with new moderate to severe LV dysfxn  - cardiology work up appreciated  - keep on ppi for gi ppx while in a/c  - s/p cardiac cath w/ stent placement  now on brilinta  monitor stools for any evidence of GIB  H/H stable  on diuresis per cards/nephro

## 2017-12-11 NOTE — PROGRESS NOTE ADULT - ASSESSMENT
1.	MERVIN, initially sec to cardio-renal syndrome. Renal function was improving with diuresing, then worsened possible sec to hyperglycemia/contrast, improving today, CKD stage 3: Baseline Scr 1.5-1.8  2.	CHF decompensation:  Follow up cardiology  3.	Metabolic Acidosis: improving  4.	Hyponatremia: Improved  5.	Hypokalemia: supplemented     PLAN:  1.	diuresing per cardio  2.	Monitor renal function and electrolyte at present  3.	Better diabetes control 1.	MERVIN, initially sec to cardio-renal syndrome. Renal function was improving with diuresing, then worsened possible sec to hyperglycemia/contrast, improving today, CKD stage 3: Baseline Scr 1.5-1.8  2.	CHF decompensation:  Follow up cardiology  3.	Metabolic Acidosis: improving  4.	Hyponatremia: Improved  5.	Hypokalemia: supplemented     PLAN:  1.	diuresis per cardio  2.	Monitor renal function and electrolyte at present  3.	Better diabetes control

## 2017-12-11 NOTE — PROGRESS NOTE ADULT - PROVIDER SPECIALTY LIST ADULT
CCU
CCU
Cardiology
Endocrinology
Gastroenterology
Infectious Disease
Nephrology
Cardiology
Endocrinology
Nephrology
Endocrinology
Endocrinology
Cardiology

## 2018-01-01 ENCOUNTER — OUTPATIENT (OUTPATIENT)
Dept: OUTPATIENT SERVICES | Facility: HOSPITAL | Age: 73
LOS: 1 days | End: 2018-01-01
Payer: MEDICAID

## 2018-01-01 DIAGNOSIS — Z95.1 PRESENCE OF AORTOCORONARY BYPASS GRAFT: Chronic | ICD-10-CM

## 2018-01-01 PROCEDURE — G9001: CPT

## 2018-01-18 ENCOUNTER — INPATIENT (INPATIENT)
Facility: HOSPITAL | Age: 73
LOS: 21 days | Discharge: HOME | End: 2018-02-09
Attending: INTERNAL MEDICINE | Admitting: INTERNAL MEDICINE
Payer: MEDICAID

## 2018-01-18 VITALS
OXYGEN SATURATION: 98 % | RESPIRATION RATE: 32 BRPM | DIASTOLIC BLOOD PRESSURE: 74 MMHG | TEMPERATURE: 98 F | SYSTOLIC BLOOD PRESSURE: 130 MMHG | HEART RATE: 92 BPM

## 2018-01-18 DIAGNOSIS — Z95.1 PRESENCE OF AORTOCORONARY BYPASS GRAFT: Chronic | ICD-10-CM

## 2018-01-18 DIAGNOSIS — I50.9 HEART FAILURE, UNSPECIFIED: ICD-10-CM

## 2018-01-18 LAB
ALBUMIN SERPL ELPH-MCNC: 3.9 G/DL — SIGNIFICANT CHANGE UP (ref 3.3–5)
ALP SERPL-CCNC: 60 U/L — SIGNIFICANT CHANGE UP (ref 40–120)
ALT FLD-CCNC: 14 U/L — SIGNIFICANT CHANGE UP (ref 4–33)
AST SERPL-CCNC: 26 U/L — SIGNIFICANT CHANGE UP (ref 4–32)
BASE EXCESS BLDV CALC-SCNC: -5.2 MMOL/L — SIGNIFICANT CHANGE UP
BASOPHILS # BLD AUTO: 0.03 K/UL — SIGNIFICANT CHANGE UP (ref 0–0.2)
BASOPHILS NFR BLD AUTO: 0.3 % — SIGNIFICANT CHANGE UP (ref 0–2)
BILIRUB SERPL-MCNC: 0.2 MG/DL — SIGNIFICANT CHANGE UP (ref 0.2–1.2)
BLOOD GAS VENOUS - CREATININE: 1.45 MG/DL — HIGH (ref 0.5–1.3)
BUN SERPL-MCNC: 27 MG/DL — HIGH (ref 7–23)
CALCIUM SERPL-MCNC: 9 MG/DL — SIGNIFICANT CHANGE UP (ref 8.4–10.5)
CHLORIDE BLDV-SCNC: 115 MMOL/L — HIGH (ref 96–108)
CHLORIDE SERPL-SCNC: 110 MMOL/L — HIGH (ref 98–107)
CK MB BLD-MCNC: 4.84 NG/ML — HIGH (ref 1–4.7)
CK MB BLD-MCNC: SIGNIFICANT CHANGE UP (ref 0–2.5)
CK SERPL-CCNC: 142 U/L — SIGNIFICANT CHANGE UP (ref 25–170)
CO2 SERPL-SCNC: 17 MMOL/L — LOW (ref 22–31)
CREAT SERPL-MCNC: 1.45 MG/DL — HIGH (ref 0.5–1.3)
EOSINOPHIL # BLD AUTO: 0.2 K/UL — SIGNIFICANT CHANGE UP (ref 0–0.5)
EOSINOPHIL NFR BLD AUTO: 1.9 % — SIGNIFICANT CHANGE UP (ref 0–6)
GAS PNL BLDV: 137 MMOL/L — SIGNIFICANT CHANGE UP (ref 136–146)
GLUCOSE BLDV-MCNC: 219 — HIGH (ref 70–99)
GLUCOSE SERPL-MCNC: 220 MG/DL — HIGH (ref 70–99)
HCO3 BLDV-SCNC: 20 MMOL/L — SIGNIFICANT CHANGE UP (ref 20–27)
HCT VFR BLD CALC: 33.9 % — LOW (ref 34.5–45)
HCT VFR BLDV CALC: 33.6 % — LOW (ref 34.5–45)
HGB BLD-MCNC: 10.5 G/DL — LOW (ref 11.5–15.5)
HGB BLDV-MCNC: 10.9 G/DL — LOW (ref 11.5–15.5)
IMM GRANULOCYTES # BLD AUTO: 0.07 # — SIGNIFICANT CHANGE UP
IMM GRANULOCYTES NFR BLD AUTO: 0.7 % — SIGNIFICANT CHANGE UP (ref 0–1.5)
LACTATE BLDV-MCNC: 1.7 MMOL/L — SIGNIFICANT CHANGE UP (ref 0.5–2)
LYMPHOCYTES # BLD AUTO: 2.33 K/UL — SIGNIFICANT CHANGE UP (ref 1–3.3)
LYMPHOCYTES # BLD AUTO: 22.5 % — SIGNIFICANT CHANGE UP (ref 13–44)
MCHC RBC-ENTMCNC: 26.7 PG — LOW (ref 27–34)
MCHC RBC-ENTMCNC: 31 % — LOW (ref 32–36)
MCV RBC AUTO: 86.3 FL — SIGNIFICANT CHANGE UP (ref 80–100)
MONOCYTES # BLD AUTO: 0.8 K/UL — SIGNIFICANT CHANGE UP (ref 0–0.9)
MONOCYTES NFR BLD AUTO: 7.7 % — SIGNIFICANT CHANGE UP (ref 2–14)
NEUTROPHILS # BLD AUTO: 6.91 K/UL — SIGNIFICANT CHANGE UP (ref 1.8–7.4)
NEUTROPHILS NFR BLD AUTO: 66.9 % — SIGNIFICANT CHANGE UP (ref 43–77)
NRBC # FLD: 0 — SIGNIFICANT CHANGE UP
NT-PROBNP SERPL-SCNC: 1707 PG/ML — SIGNIFICANT CHANGE UP
PCO2 BLDV: 36 MMHG — LOW (ref 41–51)
PH BLDV: 7.35 PH — SIGNIFICANT CHANGE UP (ref 7.32–7.43)
PLATELET # BLD AUTO: 348 K/UL — SIGNIFICANT CHANGE UP (ref 150–400)
PMV BLD: 9 FL — SIGNIFICANT CHANGE UP (ref 7–13)
PO2 BLDV: 46 MMHG — HIGH (ref 35–40)
POTASSIUM BLDV-SCNC: 4.6 MMOL/L — HIGH (ref 3.4–4.5)
POTASSIUM SERPL-MCNC: 4.2 MMOL/L — SIGNIFICANT CHANGE UP (ref 3.5–5.3)
POTASSIUM SERPL-SCNC: 4.2 MMOL/L — SIGNIFICANT CHANGE UP (ref 3.5–5.3)
PROT SERPL-MCNC: 7.6 G/DL — SIGNIFICANT CHANGE UP (ref 6–8.3)
RBC # BLD: 3.93 M/UL — SIGNIFICANT CHANGE UP (ref 3.8–5.2)
RBC # FLD: 14.6 % — HIGH (ref 10.3–14.5)
SAO2 % BLDV: 75.6 % — SIGNIFICANT CHANGE UP (ref 60–85)
SODIUM SERPL-SCNC: 144 MMOL/L — SIGNIFICANT CHANGE UP (ref 135–145)
TROPONIN T SERPL-MCNC: 0.26 NG/ML — HIGH (ref 0–0.06)
WBC # BLD: 10.34 K/UL — SIGNIFICANT CHANGE UP (ref 3.8–10.5)
WBC # FLD AUTO: 10.34 K/UL — SIGNIFICANT CHANGE UP (ref 3.8–10.5)

## 2018-01-18 PROCEDURE — 71045 X-RAY EXAM CHEST 1 VIEW: CPT | Mod: 26

## 2018-01-18 RX ORDER — ASPIRIN/CALCIUM CARB/MAGNESIUM 324 MG
162 TABLET ORAL DAILY
Qty: 0 | Refills: 0 | Status: DISCONTINUED | OUTPATIENT
Start: 2018-01-18 | End: 2018-01-18

## 2018-01-18 RX ORDER — ASPIRIN/CALCIUM CARB/MAGNESIUM 324 MG
162 TABLET ORAL ONCE
Qty: 0 | Refills: 0 | Status: COMPLETED | OUTPATIENT
Start: 2018-01-18 | End: 2018-01-18

## 2018-01-18 RX ORDER — FUROSEMIDE 40 MG
20 TABLET ORAL ONCE
Qty: 0 | Refills: 0 | Status: COMPLETED | OUTPATIENT
Start: 2018-01-18 | End: 2018-01-18

## 2018-01-18 RX ADMIN — Medication 162 MILLIGRAM(S): at 22:24

## 2018-01-18 RX ADMIN — Medication 20 MILLIGRAM(S): at 20:39

## 2018-01-18 NOTE — ED ADULT NURSE REASSESSMENT NOTE - NS ED NURSE REASSESS COMMENT FT1
Pt A&Ox3, respirations even but labored, R lung clear to auscultation, Wheezing throughout L lung. Placed on BIPAP for SOB - 10/5 14RR 60%, VSS, IV20G R ac placed by float, RN family at bedside, will continue to monitor

## 2018-01-18 NOTE — ED ADULT TRIAGE NOTE - CHIEF COMPLAINT QUOTE
arrives from PMD office for eval of worsening SOB over the past 2 days.  Denies chest pain.  Denies cough, fever.   h/o DM, HTN, CABG, 3 stents.

## 2018-01-18 NOTE — ED ADULT NURSE REASSESSMENT NOTE - NS ED NURSE REASSESS COMMENT FT1
BIPAP taken off by MD Alicea [id number], tolerating well, put on 2L NC for comfort. Sat 97% will continue to monitor

## 2018-01-18 NOTE — ED PROVIDER NOTE - PROGRESS NOTE DETAILS
rajendra: [facility] paged re admission rajendra: [facility] paged x2 rajendra: [facility] paged re admission, ptnt feeling much better after bipap. wob improved

## 2018-01-18 NOTE — ED PROVIDER NOTE - CRITICAL CARE INDICATION, MLM
patient was critically ill... Patient was critically ill with a high probability of imminent or life threatening deterioration. respiratory distress requiring bipap

## 2018-01-18 NOTE — ED PROVIDER NOTE - MEDICAL DECISION MAKING DETAILS
71 y/o f presents with shortness of breath x 2 days, b/l crackles at bases, tachypneic, inc wob, will check labs, ekg, cxr, bipap, diurese, reass

## 2018-01-18 NOTE — ED PROVIDER NOTE - DATE/TIME 1
Patient states she woke up with diarrhea yesterday and believes she has the stomach virus. She has a headache and thinks she may be dehydrated a bit. There is no vomiting. She does not want to take imodium because this generally constipates her. She bought G2 Gatorade and wants to know if you recommend she drink this to replenish fluids since she is a diabetic.    18-Jan-2018 21:40

## 2018-01-18 NOTE — ED ADULT NURSE NOTE - OBJECTIVE STATEMENT
float rn-pt received trauma b, alert and orientedx3. family at bedside for translation. c.o sob x2 days, worsening today. o2 sat 90% upon ems arrival. respirations even, labored. crackles to bases of lungs. nsr on cm. denies fever, cough, cp, dizziness. IV placed by ems, 20g right ac. labs sent. BIPAP applied by respiratory, pt tolerating well. NAD noted at this time. rpt given to primary rn mary ann.

## 2018-01-18 NOTE — ED PROVIDER NOTE - OBJECTIVE STATEMENT
71 y/o f presents with sob. Patient started to feel sob 2 days ago, progressively worsened, a lot worse today, +orthopnea. Saw her doctor who sent her right to ed. Denies fevers, cough, cp, abd pain, vomiting, diarrhea, dysuria. Notes she has required bipap in past for chf exacerbations. Takes lasix 20mg once a day. 71 y/o f presents with sob. Patient started to feel sob 2 days ago, progressively worsened, a lot worse today, +orthopnea. Saw her doctor who sent her right to ed. Denies fevers, cough, cp, abd pain, vomiting, diarrhea, dysuria. Notes she has required bipap in past for chf exacerbations. Takes lasix 20mg once a day.    [place]: pt with recent stent place in early december and echo with EF 36% fatmata colon-71 y/o f presents with sob. Patient started to feel sob 2 days ago, progressively worsened, a lot worse today, +orthopnea. Saw her doctor who sent her right to ed. Denies fevers, cough, cp, abd pain, vomiting, diarrhea, dysuria. Notes she has required bipap in past for chf exacerbations. Takes lasix 20mg once a day.    [place]: pt with recent stent place in early december and echo with EF 36%

## 2018-01-19 DIAGNOSIS — K21.9 GASTRO-ESOPHAGEAL REFLUX DISEASE WITHOUT ESOPHAGITIS: ICD-10-CM

## 2018-01-19 DIAGNOSIS — I50.9 HEART FAILURE, UNSPECIFIED: ICD-10-CM

## 2018-01-19 DIAGNOSIS — E87.2 ACIDOSIS: ICD-10-CM

## 2018-01-19 DIAGNOSIS — E03.9 HYPOTHYROIDISM, UNSPECIFIED: ICD-10-CM

## 2018-01-19 DIAGNOSIS — D64.9 ANEMIA, UNSPECIFIED: ICD-10-CM

## 2018-01-19 DIAGNOSIS — Z29.9 ENCOUNTER FOR PROPHYLACTIC MEASURES, UNSPECIFIED: ICD-10-CM

## 2018-01-19 DIAGNOSIS — N18.3 CHRONIC KIDNEY DISEASE, STAGE 3 (MODERATE): ICD-10-CM

## 2018-01-19 DIAGNOSIS — E78.5 HYPERLIPIDEMIA, UNSPECIFIED: ICD-10-CM

## 2018-01-19 DIAGNOSIS — E11.9 TYPE 2 DIABETES MELLITUS WITHOUT COMPLICATIONS: ICD-10-CM

## 2018-01-19 LAB
B PERT DNA SPEC QL NAA+PROBE: SIGNIFICANT CHANGE UP
BUN SERPL-MCNC: 27 MG/DL — HIGH (ref 7–23)
C PNEUM DNA SPEC QL NAA+PROBE: NOT DETECTED — SIGNIFICANT CHANGE UP
CALCIUM SERPL-MCNC: 9 MG/DL — SIGNIFICANT CHANGE UP (ref 8.4–10.5)
CHLORIDE SERPL-SCNC: 107 MMOL/L — SIGNIFICANT CHANGE UP (ref 98–107)
CHOLEST SERPL-MCNC: 130 MG/DL — SIGNIFICANT CHANGE UP (ref 120–199)
CK SERPL-CCNC: 114 U/L — SIGNIFICANT CHANGE UP (ref 25–170)
CO2 SERPL-SCNC: 20 MMOL/L — LOW (ref 22–31)
CREAT SERPL-MCNC: 1.68 MG/DL — HIGH (ref 0.5–1.3)
FLUAV H1 2009 PAND RNA SPEC QL NAA+PROBE: NOT DETECTED — SIGNIFICANT CHANGE UP
FLUAV H1 RNA SPEC QL NAA+PROBE: NOT DETECTED — SIGNIFICANT CHANGE UP
FLUAV H3 RNA SPEC QL NAA+PROBE: NOT DETECTED — SIGNIFICANT CHANGE UP
FLUAV SUBTYP SPEC NAA+PROBE: SIGNIFICANT CHANGE UP
FLUBV RNA SPEC QL NAA+PROBE: NOT DETECTED — SIGNIFICANT CHANGE UP
GLUCOSE BLDC GLUCOMTR-MCNC: 148 MG/DL — HIGH (ref 70–99)
GLUCOSE BLDC GLUCOMTR-MCNC: 319 MG/DL — HIGH (ref 70–99)
GLUCOSE BLDC GLUCOMTR-MCNC: 342 MG/DL — HIGH (ref 70–99)
GLUCOSE SERPL-MCNC: 255 MG/DL — HIGH (ref 70–99)
HADV DNA SPEC QL NAA+PROBE: NOT DETECTED — SIGNIFICANT CHANGE UP
HCOV 229E RNA SPEC QL NAA+PROBE: NOT DETECTED — SIGNIFICANT CHANGE UP
HCOV HKU1 RNA SPEC QL NAA+PROBE: NOT DETECTED — SIGNIFICANT CHANGE UP
HCOV NL63 RNA SPEC QL NAA+PROBE: NOT DETECTED — SIGNIFICANT CHANGE UP
HCOV OC43 RNA SPEC QL NAA+PROBE: NOT DETECTED — SIGNIFICANT CHANGE UP
HCT VFR BLD CALC: 31.2 % — LOW (ref 34.5–45)
HDLC SERPL-MCNC: 30 MG/DL — LOW (ref 45–65)
HGB BLD-MCNC: 9.8 G/DL — LOW (ref 11.5–15.5)
HMPV RNA SPEC QL NAA+PROBE: NOT DETECTED — SIGNIFICANT CHANGE UP
HPIV1 RNA SPEC QL NAA+PROBE: NOT DETECTED — SIGNIFICANT CHANGE UP
HPIV2 RNA SPEC QL NAA+PROBE: NOT DETECTED — SIGNIFICANT CHANGE UP
HPIV3 RNA SPEC QL NAA+PROBE: NOT DETECTED — SIGNIFICANT CHANGE UP
HPIV4 RNA SPEC QL NAA+PROBE: NOT DETECTED — SIGNIFICANT CHANGE UP
LIPID PNL WITH DIRECT LDL SERPL: 70 MG/DL — SIGNIFICANT CHANGE UP
M PNEUMO DNA SPEC QL NAA+PROBE: NOT DETECTED — SIGNIFICANT CHANGE UP
MAGNESIUM SERPL-MCNC: 1.9 MG/DL — SIGNIFICANT CHANGE UP (ref 1.6–2.6)
MCHC RBC-ENTMCNC: 26.7 PG — LOW (ref 27–34)
MCHC RBC-ENTMCNC: 31.4 % — LOW (ref 32–36)
MCV RBC AUTO: 85 FL — SIGNIFICANT CHANGE UP (ref 80–100)
NRBC # FLD: 0 — SIGNIFICANT CHANGE UP
PHOSPHATE SERPL-MCNC: 2.8 MG/DL — SIGNIFICANT CHANGE UP (ref 2.5–4.5)
PLATELET # BLD AUTO: 298 K/UL — SIGNIFICANT CHANGE UP (ref 150–400)
PMV BLD: 9.2 FL — SIGNIFICANT CHANGE UP (ref 7–13)
POTASSIUM SERPL-MCNC: 4 MMOL/L — SIGNIFICANT CHANGE UP (ref 3.5–5.3)
POTASSIUM SERPL-SCNC: 4 MMOL/L — SIGNIFICANT CHANGE UP (ref 3.5–5.3)
RBC # BLD: 3.67 M/UL — LOW (ref 3.8–5.2)
RBC # FLD: 14.6 % — HIGH (ref 10.3–14.5)
RSV RNA SPEC QL NAA+PROBE: NOT DETECTED — SIGNIFICANT CHANGE UP
RV+EV RNA SPEC QL NAA+PROBE: NOT DETECTED — SIGNIFICANT CHANGE UP
SODIUM SERPL-SCNC: 140 MMOL/L — SIGNIFICANT CHANGE UP (ref 135–145)
TRIGL SERPL-MCNC: 246 MG/DL — HIGH (ref 10–149)
TROPONIN T SERPL-MCNC: 0.28 NG/ML — HIGH (ref 0–0.06)
TSH SERPL-MCNC: 0.55 UIU/ML — SIGNIFICANT CHANGE UP (ref 0.27–4.2)
WBC # BLD: 10.27 K/UL — SIGNIFICANT CHANGE UP (ref 3.8–10.5)
WBC # FLD AUTO: 10.27 K/UL — SIGNIFICANT CHANGE UP (ref 3.8–10.5)

## 2018-01-19 RX ORDER — INSULIN GLARGINE 100 [IU]/ML
45 INJECTION, SOLUTION SUBCUTANEOUS AT BEDTIME
Qty: 0 | Refills: 0 | Status: DISCONTINUED | OUTPATIENT
Start: 2018-01-19 | End: 2018-01-20

## 2018-01-19 RX ORDER — HEPARIN SODIUM 5000 [USP'U]/ML
5000 INJECTION INTRAVENOUS; SUBCUTANEOUS EVERY 12 HOURS
Qty: 0 | Refills: 0 | Status: DISCONTINUED | OUTPATIENT
Start: 2018-01-19 | End: 2018-02-09

## 2018-01-19 RX ORDER — SODIUM CHLORIDE 9 MG/ML
1000 INJECTION, SOLUTION INTRAVENOUS
Qty: 0 | Refills: 0 | Status: DISCONTINUED | OUTPATIENT
Start: 2018-01-19 | End: 2018-02-09

## 2018-01-19 RX ORDER — SODIUM CHLORIDE 9 MG/ML
3 INJECTION INTRAMUSCULAR; INTRAVENOUS; SUBCUTANEOUS EVERY 8 HOURS
Qty: 0 | Refills: 0 | Status: DISCONTINUED | OUTPATIENT
Start: 2018-01-19 | End: 2018-02-09

## 2018-01-19 RX ORDER — FUROSEMIDE 40 MG
40 TABLET ORAL DAILY
Qty: 0 | Refills: 0 | Status: DISCONTINUED | OUTPATIENT
Start: 2018-01-19 | End: 2018-01-23

## 2018-01-19 RX ORDER — MONTELUKAST 4 MG/1
10 TABLET, CHEWABLE ORAL AT BEDTIME
Qty: 0 | Refills: 0 | Status: DISCONTINUED | OUTPATIENT
Start: 2018-01-19 | End: 2018-02-09

## 2018-01-19 RX ORDER — INSULIN GLARGINE 100 [IU]/ML
65 INJECTION, SOLUTION SUBCUTANEOUS AT BEDTIME
Qty: 0 | Refills: 0 | Status: DISCONTINUED | OUTPATIENT
Start: 2018-01-19 | End: 2018-01-19

## 2018-01-19 RX ORDER — METOPROLOL TARTRATE 50 MG
12.5 TABLET ORAL DAILY
Qty: 0 | Refills: 0 | Status: DISCONTINUED | OUTPATIENT
Start: 2018-01-19 | End: 2018-02-09

## 2018-01-19 RX ORDER — DEXTROSE 50 % IN WATER 50 %
25 SYRINGE (ML) INTRAVENOUS ONCE
Qty: 0 | Refills: 0 | Status: DISCONTINUED | OUTPATIENT
Start: 2018-01-19 | End: 2018-02-09

## 2018-01-19 RX ORDER — FAMOTIDINE 10 MG/ML
20 INJECTION INTRAVENOUS DAILY
Qty: 0 | Refills: 0 | Status: DISCONTINUED | OUTPATIENT
Start: 2018-01-19 | End: 2018-02-09

## 2018-01-19 RX ORDER — INSULIN LISPRO 100/ML
VIAL (ML) SUBCUTANEOUS
Qty: 0 | Refills: 0 | Status: DISCONTINUED | OUTPATIENT
Start: 2018-01-19 | End: 2018-02-09

## 2018-01-19 RX ORDER — LEVOTHYROXINE SODIUM 125 MCG
50 TABLET ORAL DAILY
Qty: 0 | Refills: 0 | Status: DISCONTINUED | OUTPATIENT
Start: 2018-01-19 | End: 2018-02-09

## 2018-01-19 RX ORDER — TICAGRELOR 90 MG/1
90 TABLET ORAL
Qty: 0 | Refills: 0 | Status: DISCONTINUED | OUTPATIENT
Start: 2018-01-19 | End: 2018-02-09

## 2018-01-19 RX ORDER — ASPIRIN/CALCIUM CARB/MAGNESIUM 324 MG
81 TABLET ORAL DAILY
Qty: 0 | Refills: 0 | Status: DISCONTINUED | OUTPATIENT
Start: 2018-01-19 | End: 2018-02-09

## 2018-01-19 RX ORDER — INSULIN LISPRO 100/ML
8 VIAL (ML) SUBCUTANEOUS
Qty: 0 | Refills: 0 | Status: DISCONTINUED | OUTPATIENT
Start: 2018-01-19 | End: 2018-01-20

## 2018-01-19 RX ORDER — DEXTROSE 50 % IN WATER 50 %
12.5 SYRINGE (ML) INTRAVENOUS ONCE
Qty: 0 | Refills: 0 | Status: DISCONTINUED | OUTPATIENT
Start: 2018-01-19 | End: 2018-02-09

## 2018-01-19 RX ORDER — ATORVASTATIN CALCIUM 80 MG/1
40 TABLET, FILM COATED ORAL AT BEDTIME
Qty: 0 | Refills: 0 | Status: DISCONTINUED | OUTPATIENT
Start: 2018-01-19 | End: 2018-02-09

## 2018-01-19 RX ORDER — INSULIN LISPRO 100/ML
VIAL (ML) SUBCUTANEOUS AT BEDTIME
Qty: 0 | Refills: 0 | Status: DISCONTINUED | OUTPATIENT
Start: 2018-01-19 | End: 2018-02-09

## 2018-01-19 RX ORDER — DEXTROSE 50 % IN WATER 50 %
1 SYRINGE (ML) INTRAVENOUS ONCE
Qty: 0 | Refills: 0 | Status: DISCONTINUED | OUTPATIENT
Start: 2018-01-19 | End: 2018-02-09

## 2018-01-19 RX ORDER — GLUCAGON INJECTION, SOLUTION 0.5 MG/.1ML
1 INJECTION, SOLUTION SUBCUTANEOUS ONCE
Qty: 0 | Refills: 0 | Status: DISCONTINUED | OUTPATIENT
Start: 2018-01-19 | End: 2018-02-09

## 2018-01-19 RX ADMIN — SODIUM CHLORIDE 3 MILLILITER(S): 9 INJECTION INTRAMUSCULAR; INTRAVENOUS; SUBCUTANEOUS at 21:49

## 2018-01-19 RX ADMIN — HEPARIN SODIUM 5000 UNIT(S): 5000 INJECTION INTRAVENOUS; SUBCUTANEOUS at 17:44

## 2018-01-19 RX ADMIN — Medication 50 MICROGRAM(S): at 06:54

## 2018-01-19 RX ADMIN — MONTELUKAST 10 MILLIGRAM(S): 4 TABLET, CHEWABLE ORAL at 21:50

## 2018-01-19 RX ADMIN — INSULIN GLARGINE 45 UNIT(S): 100 INJECTION, SOLUTION SUBCUTANEOUS at 21:50

## 2018-01-19 RX ADMIN — HEPARIN SODIUM 5000 UNIT(S): 5000 INJECTION INTRAVENOUS; SUBCUTANEOUS at 06:54

## 2018-01-19 RX ADMIN — Medication 40 MILLIGRAM(S): at 06:54

## 2018-01-19 RX ADMIN — SODIUM CHLORIDE 3 MILLILITER(S): 9 INJECTION INTRAMUSCULAR; INTRAVENOUS; SUBCUTANEOUS at 15:49

## 2018-01-19 RX ADMIN — Medication 8 UNIT(S): at 17:59

## 2018-01-19 RX ADMIN — SODIUM CHLORIDE 3 MILLILITER(S): 9 INJECTION INTRAMUSCULAR; INTRAVENOUS; SUBCUTANEOUS at 06:43

## 2018-01-19 RX ADMIN — Medication 8 UNIT(S): at 13:35

## 2018-01-19 RX ADMIN — TICAGRELOR 90 MILLIGRAM(S): 90 TABLET ORAL at 06:54

## 2018-01-19 RX ADMIN — Medication 4: at 21:50

## 2018-01-19 RX ADMIN — Medication 81 MILLIGRAM(S): at 12:06

## 2018-01-19 RX ADMIN — Medication 12.5 MILLIGRAM(S): at 06:54

## 2018-01-19 RX ADMIN — ATORVASTATIN CALCIUM 40 MILLIGRAM(S): 80 TABLET, FILM COATED ORAL at 21:50

## 2018-01-19 RX ADMIN — Medication 8: at 13:35

## 2018-01-19 RX ADMIN — FAMOTIDINE 20 MILLIGRAM(S): 10 INJECTION INTRAVENOUS at 12:06

## 2018-01-19 RX ADMIN — TICAGRELOR 90 MILLIGRAM(S): 90 TABLET ORAL at 17:44

## 2018-01-19 RX ADMIN — Medication: at 09:34

## 2018-01-19 NOTE — PHYSICAL THERAPY INITIAL EVALUATION ADULT - CRITERIA FOR SKILLED THERAPEUTIC INTERVENTIONS
anticipated D/C to home with home PT services to address current functional limitations to optimize safety within the home environment./functional limitations in following categories/rehab potential/anticipated discharge recommendation/impairments found

## 2018-01-19 NOTE — PHYSICAL THERAPY INITIAL EVALUATION ADULT - ADDITIONAL COMMENTS
Social history provided by pt. daughter. As per daughter, Pt. lives in a pvt home with their daughter. Pt. has no steps at home secondary to bedroom/bathroom located on first floor. Pt. was previously ambulating without the use of an AD independently however, she owns a Rolling walker at home (does not use). Pt. was previously independent with ADLs however, they would benefit from occasional assistance. Pt. returned to bed at end of therapy session with all lines and tubes intact, call bell in reach and in NAD.

## 2018-01-19 NOTE — H&P ADULT - PROBLEM SELECTOR PLAN 1
ekg/telemetry, f/u ce x 2, mg, tsh, recent echo done. con't lasix 40 iv daily, strict I & O's, daily wts, fluid restriction, probnp

## 2018-01-19 NOTE — ED ADULT NURSE REASSESSMENT NOTE - NS ED NURSE REASSESS COMMENT FT1
Verbal report to Jamilah RN in ESSU 1, pt in no distress able to ambulate with assistance. Spoke with Maurisio FIELD in regards to + troponin he states he is aware and will trend troponin

## 2018-01-19 NOTE — H&P ADULT - ASSESSMENT
71 Female, with a PmHx of HTN, CKD, Hyperlipidemia, DM-II, CAD s/p CABG x 3, s/p stents, presenting to the [facility] ED c/o shortness of breath. Pt is being admitted to telemetry for acute on chronic systolic heart failure.

## 2018-01-19 NOTE — CHART NOTE - NSCHARTNOTEFT_GEN_A_CORE
This is pleasant 71 yr old  Female well known to our service w/ PMH HTN, CKD, Hyperlipidemia, DM-II, CAD s/p 3V CABG  (LIMA to LAD, SVG to OM, SVG to PDA) at [facility] 2014, s/P CORNELIA TO RI 11/17/17 who was recently admitted to [facility] in the beginning of December with NSTEMI s/p CORNELIA to ramus. She was started on Brilinta and ASA.  TTE at that time revealed grossly moderate to severe LV dysfunction with hypokinesis of the inferior, lateral and inferolateral with an EF of 36%.   She is now readmitted with c/o progressively worsening shortness of breath.   Her daughter is at bedside translating. They deny any medication noncompliance.   no acute ischemia on EKG.  medicine and renal eval pending.  mildly elevated trops noted - no need for urgent repeat cath at this time  CT chest (non contrast) ordered- if significant effusions present, will consult pulmonary  follow up CT chest  diurese with IV Lasix if ok with renal  c/w DAPT for recent CORNELIA  d/w Dr Ga  f/u with Dr Quick for cardiology upon DC      Elizabeth Combs [facility]  [facility]  O:  [phone]  P: [phone]

## 2018-01-19 NOTE — H&P ADULT - FAMILY HISTORY
Sibling  Still living? Yes, Estimated age: Age Unknown  Family history of diabetes mellitus, Age at diagnosis: Age Unknown  Family history of hypertension, Age at diagnosis: Age Unknown

## 2018-01-19 NOTE — H&P ADULT - NSHPPHYSICALEXAM_GEN_ALL_CORE
Vital Signs Last 24 Hrs  T(C): 36.2 (19 Jan 2018 00:07), Max: 36.8 (18 Jan 2018 19:56)  T(F): 97.2 (19 Jan 2018 00:07), Max: 98.3 (18 Jan 2018 19:56)  HR: 86 (19 Jan 2018 09:08) (81 - 92)  BP: 147/92 (19 Jan 2018 09:08) (116/51 - 152/66)  BP(mean): --  RR: 20 (19 Jan 2018 09:08) (20 - 32)  SpO2: 100% (19 Jan 2018 09:08) (98% - 100%)    EKG: NSR @ 96, T inv I, AVL, PVC's

## 2018-01-19 NOTE — H&P ADULT - NSHPSOCIALHISTORY_GEN_ALL_CORE
Marital Status:  -  lives in [facility] - lives with daughter    Occupation: Homemaker    Tobacco Use: neg    ETOH Use: neg    Flu Vaccine:    4 months ago         Pneumonia Vaccine: neg

## 2018-01-19 NOTE — ED ADULT NURSE REASSESSMENT NOTE - NS ED NURSE REASSESS COMMENT FT1
pt currently on 2 LPM via NC no longer requiring BiPAP pt resting comfortable able to speak full sentences and states SOB has improved. VSS CCM in place NSR will CTM.

## 2018-01-19 NOTE — PHYSICAL THERAPY INITIAL EVALUATION ADULT - PERTINENT HX OF CURRENT PROBLEM, REHAB EVAL
Pt. is a 72 year old female admitted to [facility] secondary to heart failure PMH: DM, GERD

## 2018-01-19 NOTE — PHYSICAL THERAPY INITIAL EVALUATION ADULT - DISCHARGE DISPOSITION, PT EVAL
home w/ home PT/anticipated D/C to home with home PT services to address current functional limitations to optimize safety within the home environment.

## 2018-01-19 NOTE — H&P ADULT - HISTORY OF PRESENT ILLNESS
71 Female, with a PmHx of HTN, CKD, Hyperlipidemia, DM-II, CAD s/p CABG x 3, s/p stents, presenting to the [facility] ED c/o shortness of breath. Pt states for the past 2 weeks she has been feeling sob but for the past 2 days it was getting worse and this morning had gotten even worse so she had gone to see her doctor and was then referred to the hospital for an evaluation.  She denies any coughing, fever, chills, HA, dizziness, blurred vision, n/v, sick contacts, recent travel. Pt sates she was also c/o a burning sensation to the center of her chest that was non-radiating. Pt was recently admitted to [facility] in 12/2017 for a similar event that was requiring bipap and was in the CCU.  In the ED today, she had a cxr done that showed a trace bilateral pleural effusions and vascular pulmonary congestion. RVP is neg. Pt appear to be in mild distress at this time but is saturating 100% on nasal cannula. Pt was re-admitted to telemetry for acute on chronic systolic heart failure.

## 2018-01-19 NOTE — PHYSICAL THERAPY INITIAL EVALUATION ADULT - PATIENT PROFILE REVIEW, REHAB EVAL
yes/Pt. profile reviewed, consulted with RN Jamilah SALTER prior to initial PT evaluation and tx, as per RN, Pt. is OK to participate in skilled therapy session, current activity orders; increase as tolerated

## 2018-01-19 NOTE — PHYSICAL THERAPY INITIAL EVALUATION ADULT - GAIT DISTANCE, PT EVAL
15ft, further ambulation distance not assessed secondary to pt. wanting to lay down secondary to fatigue.

## 2018-01-20 LAB
BUN SERPL-MCNC: 34 MG/DL — HIGH (ref 7–23)
CALCIUM SERPL-MCNC: 8.6 MG/DL — SIGNIFICANT CHANGE UP (ref 8.4–10.5)
CHLORIDE SERPL-SCNC: 105 MMOL/L — SIGNIFICANT CHANGE UP (ref 98–107)
CO2 SERPL-SCNC: 21 MMOL/L — LOW (ref 22–31)
CREAT SERPL-MCNC: 1.53 MG/DL — HIGH (ref 0.5–1.3)
FERRITIN SERPL-MCNC: 38.35 NG/ML — SIGNIFICANT CHANGE UP (ref 15–150)
GLUCOSE BLDC GLUCOMTR-MCNC: 180 MG/DL — HIGH (ref 70–99)
GLUCOSE BLDC GLUCOMTR-MCNC: 261 MG/DL — HIGH (ref 70–99)
GLUCOSE BLDC GLUCOMTR-MCNC: 287 MG/DL — HIGH (ref 70–99)
GLUCOSE BLDC GLUCOMTR-MCNC: 333 MG/DL — HIGH (ref 70–99)
GLUCOSE SERPL-MCNC: 207 MG/DL — HIGH (ref 70–99)
HCT VFR BLD CALC: 31.5 % — LOW (ref 34.5–45)
HGB BLD-MCNC: 10.1 G/DL — LOW (ref 11.5–15.5)
IRON SATN MFR SERPL: 377 UG/DL — SIGNIFICANT CHANGE UP (ref 140–530)
IRON SATN MFR SERPL: 40 UG/DL — SIGNIFICANT CHANGE UP (ref 30–160)
MCHC RBC-ENTMCNC: 27.4 PG — SIGNIFICANT CHANGE UP (ref 27–34)
MCHC RBC-ENTMCNC: 32.1 % — SIGNIFICANT CHANGE UP (ref 32–36)
MCV RBC AUTO: 85.4 FL — SIGNIFICANT CHANGE UP (ref 80–100)
NRBC # FLD: 0 — SIGNIFICANT CHANGE UP
NT-PROBNP SERPL-SCNC: 1309 PG/ML — SIGNIFICANT CHANGE UP
PLATELET # BLD AUTO: 295 K/UL — SIGNIFICANT CHANGE UP (ref 150–400)
PMV BLD: 9.2 FL — SIGNIFICANT CHANGE UP (ref 7–13)
POTASSIUM SERPL-MCNC: 4 MMOL/L — SIGNIFICANT CHANGE UP (ref 3.5–5.3)
POTASSIUM SERPL-SCNC: 4 MMOL/L — SIGNIFICANT CHANGE UP (ref 3.5–5.3)
RBC # BLD: 3.69 M/UL — LOW (ref 3.8–5.2)
RBC # FLD: 14.6 % — HIGH (ref 10.3–14.5)
SODIUM SERPL-SCNC: 142 MMOL/L — SIGNIFICANT CHANGE UP (ref 135–145)
UIBC SERPL-MCNC: 337 UG/DL — SIGNIFICANT CHANGE UP (ref 110–370)
WBC # BLD: 9.95 K/UL — SIGNIFICANT CHANGE UP (ref 3.8–10.5)
WBC # FLD AUTO: 9.95 K/UL — SIGNIFICANT CHANGE UP (ref 3.8–10.5)

## 2018-01-20 PROCEDURE — 71250 CT THORAX DX C-: CPT | Mod: 26

## 2018-01-20 RX ORDER — INSULIN LISPRO 100/ML
14 VIAL (ML) SUBCUTANEOUS
Qty: 0 | Refills: 0 | Status: DISCONTINUED | OUTPATIENT
Start: 2018-01-20 | End: 2018-01-21

## 2018-01-20 RX ORDER — INSULIN LISPRO 100/ML
14 VIAL (ML) SUBCUTANEOUS ONCE
Qty: 0 | Refills: 0 | Status: COMPLETED | OUTPATIENT
Start: 2018-01-20 | End: 2018-01-20

## 2018-01-20 RX ORDER — INSULIN GLARGINE 100 [IU]/ML
56 INJECTION, SOLUTION SUBCUTANEOUS AT BEDTIME
Qty: 0 | Refills: 0 | Status: DISCONTINUED | OUTPATIENT
Start: 2018-01-20 | End: 2018-01-24

## 2018-01-20 RX ADMIN — Medication 14 UNIT(S): at 13:49

## 2018-01-20 RX ADMIN — Medication 40 MILLIGRAM(S): at 06:36

## 2018-01-20 RX ADMIN — TICAGRELOR 90 MILLIGRAM(S): 90 TABLET ORAL at 17:04

## 2018-01-20 RX ADMIN — Medication 2: at 18:10

## 2018-01-20 RX ADMIN — INSULIN GLARGINE 56 UNIT(S): 100 INJECTION, SOLUTION SUBCUTANEOUS at 22:31

## 2018-01-20 RX ADMIN — TICAGRELOR 90 MILLIGRAM(S): 90 TABLET ORAL at 06:36

## 2018-01-20 RX ADMIN — HEPARIN SODIUM 5000 UNIT(S): 5000 INJECTION INTRAVENOUS; SUBCUTANEOUS at 06:36

## 2018-01-20 RX ADMIN — SODIUM CHLORIDE 3 MILLILITER(S): 9 INJECTION INTRAMUSCULAR; INTRAVENOUS; SUBCUTANEOUS at 06:36

## 2018-01-20 RX ADMIN — FAMOTIDINE 20 MILLIGRAM(S): 10 INJECTION INTRAVENOUS at 11:19

## 2018-01-20 RX ADMIN — Medication 81 MILLIGRAM(S): at 11:19

## 2018-01-20 RX ADMIN — Medication 6: at 13:49

## 2018-01-20 RX ADMIN — Medication 12.5 MILLIGRAM(S): at 06:36

## 2018-01-20 RX ADMIN — MONTELUKAST 10 MILLIGRAM(S): 4 TABLET, CHEWABLE ORAL at 22:31

## 2018-01-20 RX ADMIN — HEPARIN SODIUM 5000 UNIT(S): 5000 INJECTION INTRAVENOUS; SUBCUTANEOUS at 17:04

## 2018-01-20 RX ADMIN — Medication 8: at 09:08

## 2018-01-20 RX ADMIN — Medication 8 UNIT(S): at 09:08

## 2018-01-20 RX ADMIN — Medication 2: at 22:31

## 2018-01-20 RX ADMIN — SODIUM CHLORIDE 3 MILLILITER(S): 9 INJECTION INTRAMUSCULAR; INTRAVENOUS; SUBCUTANEOUS at 13:05

## 2018-01-20 RX ADMIN — ATORVASTATIN CALCIUM 40 MILLIGRAM(S): 80 TABLET, FILM COATED ORAL at 22:31

## 2018-01-20 RX ADMIN — Medication 50 MICROGRAM(S): at 06:36

## 2018-01-20 RX ADMIN — SODIUM CHLORIDE 3 MILLILITER(S): 9 INJECTION INTRAMUSCULAR; INTRAVENOUS; SUBCUTANEOUS at 22:19

## 2018-01-20 RX ADMIN — Medication 14 UNIT(S): at 18:10

## 2018-01-20 NOTE — DIETITIAN INITIAL EVALUATION ADULT. - OTHER INFO
Nutrition consult X CHF exacerbation, Diabetes Mellitus. Pt 73 yo female appears alert, oriented. Pt's daughter @ bed side participated in the interview. Pt speaks Telugu; this dietitian speaks Telugu as well. Pt partially edentulous; Pt with chewing difficulty reported. Food preferences discussed. No swallowing difficulty; no report of nausea/vomiting/diarrhea @ present. Pt with constipation reported. At home Pt eats food prepared with "little amount" of salt, but avoids sugar/honey reported. Pt's diet order includes Consistent Carbohydrate, DASH/TLC (cholesterol and sodium restricted) restrictions. Prescribed diet discussed with Pt & her daughter. RDN answered questions/concerns related to diet. RDN offered to provide written materials on diets, but daughter declined. RDN remains available, Pt & her daughter made aware. Nutrition consult X CHF exacerbation, Diabetes Mellitus. Pt 71 yo female appears alert, oriented. Pt's daughter @ bed side participated in the interview. Pt speaks Swedish; this dietitian speaks Swedish as well. Pt partially edentulous; Pt with chewing difficulty reported, but no report of swallowing difficulty; no report of nausea/vomiting/diarrhea @ present. Pt with constipation reported. At home Pt eats food prepared with "little amount" of salt, but avoids sugar/honey reported. Pt's diet order includes Consistent Carbohydrate, DASH/TLC (cholesterol and sodium restricted) restrictions. Prescribed diet discussed with Pt & her daughter. RDN answered questions/concerns related to diet. RDN offered to provide written materials on diets, but daughter declined. RDN remains available, Pt & her daughter made aware.

## 2018-01-20 NOTE — PROGRESS NOTE ADULT - SUBJECTIVE AND OBJECTIVE BOX
Patient is a 72y old  Female who presents with a chief complaint of SOB (19 Jan 2018 09:48)      INTERVAL HPI/OVERNIGHT EVENTS:  T(C): 36.6 (01-20-18 @ 12:57), Max: 36.8 (01-20-18 @ 06:34)  HR: 82 (01-20-18 @ 12:57) (80 - 88)  BP: 120/66 (01-20-18 @ 12:57) (109/64 - 122/73)  RR: 18 (01-20-18 @ 12:57) (18 - 20)  SpO2: 99% (01-20-18 @ 12:57) (98% - 100%)  Wt(kg): --  I&O's Summary    19 Jan 2018 07:01  -  20 Jan 2018 07:00  --------------------------------------------------------  IN: 360 mL / OUT: 700 mL / NET: -340 mL    20 Jan 2018 07:01  -  20 Jan 2018 17:35  --------------------------------------------------------  IN: 240 mL / OUT: 0 mL / NET: 240 mL        LABS:                        10.1   9.95  )-----------( 295      ( 20 Jan 2018 06:06 )             31.5     01-20    142  |  105  |  34<H>  ----------------------------<  207<H>  4.0   |  21<L>  |  1.53<H>    Ca    8.6      20 Jan 2018 06:06  Phos  2.8     01-19  Mg     1.9     01-19    TPro  7.6  /  Alb  3.9  /  TBili  0.2  /  DBili  x   /  AST  26  /  ALT  14  /  AlkPhos  60  01-18        CAPILLARY BLOOD GLUCOSE      POCT Blood Glucose.: 180 mg/dL (20 Jan 2018 17:31)  POCT Blood Glucose.: 287 mg/dL (20 Jan 2018 12:56)  POCT Blood Glucose.: 333 mg/dL (20 Jan 2018 08:48)  POCT Blood Glucose.: 342 mg/dL (19 Jan 2018 21:24)            MEDICATIONS  (STANDING):  aspirin enteric coated 81 milliGRAM(s) Oral daily  atorvastatin 40 milliGRAM(s) Oral at bedtime  dextrose 5%. 1000 milliLiter(s) (50 mL/Hr) IV Continuous <Continuous>  dextrose 50% Injectable 12.5 Gram(s) IV Push once  dextrose 50% Injectable 25 Gram(s) IV Push once  dextrose 50% Injectable 25 Gram(s) IV Push once  famotidine    Tablet 20 milliGRAM(s) Oral daily  furosemide   Injectable 40 milliGRAM(s) IV Push daily  heparin  Injectable 5000 Unit(s) SubCutaneous every 12 hours  insulin glargine Injectable (LANTUS) 56 Unit(s) SubCutaneous at bedtime  insulin lispro (HumaLOG) corrective regimen sliding scale   SubCutaneous three times a day before meals  insulin lispro (HumaLOG) corrective regimen sliding scale   SubCutaneous at bedtime  insulin lispro Injectable (HumaLOG) 14 Unit(s) SubCutaneous three times a day before meals  levothyroxine 50 MICROGram(s) Oral daily  metoprolol succinate ER 12.5 milliGRAM(s) Oral daily  montelukast 10 milliGRAM(s) Oral at bedtime  sodium chloride 0.9% lock flush 3 milliLiter(s) IV Push every 8 hours  ticagrelor 90 milliGRAM(s) Oral two times a day    MEDICATIONS  (PRN):  dextrose Gel 1 Dose(s) Oral once PRN Blood Glucose LESS THAN 70 milliGRAM(s)/deciliter  glucagon  Injectable 1 milliGRAM(s) IntraMuscular once PRN Glucose LESS THAN 70 milligrams/deciliter          PHYSICAL EXAM:  GENERAL: NAD, well-groomed, well-developed  HEAD:  Atraumatic, Normocephalic  CHEST/LUNG: Clear to percussion bilaterally; No rales, rhonchi, wheezing, or rubs  HEART: bibasilar crackles  ABDOMEN: Soft, Nontender, Nondistended; Bowel sounds present  EXTREMITIES:  2+ Peripheral Pulses, No clubbing, cyanosis, or edema  LYMPH: No lymphadenopathy noted  SKIN: No rashes or lesions    Care Discussed with Consultants/Other Providers [+ ] YES  [ ] NO

## 2018-01-20 NOTE — PROGRESS NOTE ADULT - ASSESSMENT
Assessment  DMT2: 72y Female with DM T2 with hyperglycemia on insulin, blood sugars improving, no hypoglycemia,  eating meals,  non compliant with low carb diet.  Hypothyroidism: On synthroid 50 mcg po daily,  HTN: Controlled, On med.  CHF: On medications, monitored.

## 2018-01-20 NOTE — DIETITIAN INITIAL EVALUATION ADULT. - NS AS NUTRI INTERV ED CONTENT
Purpose of the nutrition education/Nutrition relationship to health/disease/Recommended modifications Purpose of the nutrition education

## 2018-01-20 NOTE — PROGRESS NOTE ADULT - SUBJECTIVE AND OBJECTIVE BOX
Subjective:   	 no chest pain   shortness of breath   w/ some improvement     MEDICATIONS:  MEDICATIONS  (STANDING):  aspirin enteric coated 81 milliGRAM(s) Oral daily  atorvastatin 40 milliGRAM(s) Oral at bedtime  dextrose 5%. 1000 milliLiter(s) (50 mL/Hr) IV Continuous <Continuous>  dextrose 50% Injectable 12.5 Gram(s) IV Push once  dextrose 50% Injectable 25 Gram(s) IV Push once  dextrose 50% Injectable 25 Gram(s) IV Push once  famotidine    Tablet 20 milliGRAM(s) Oral daily  furosemide   Injectable 40 milliGRAM(s) IV Push daily  heparin  Injectable 5000 Unit(s) SubCutaneous every 12 hours  insulin glargine Injectable (LANTUS) 56 Unit(s) SubCutaneous at bedtime  insulin lispro (HumaLOG) corrective regimen sliding scale   SubCutaneous three times a day before meals  insulin lispro (HumaLOG) corrective regimen sliding scale   SubCutaneous at bedtime  insulin lispro Injectable (HumaLOG) 14 Unit(s) SubCutaneous three times a day before meals  levothyroxine 50 MICROGram(s) Oral daily  metoprolol succinate ER 12.5 milliGRAM(s) Oral daily  montelukast 10 milliGRAM(s) Oral at bedtime  sodium chloride 0.9% lock flush 3 milliLiter(s) IV Push every 8 hours  ticagrelor 90 milliGRAM(s) Oral two times a day    MEDICATIONS  (PRN):  dextrose Gel 1 Dose(s) Oral once PRN Blood Glucose LESS THAN 70 milliGRAM(s)/deciliter  glucagon  Injectable 1 milliGRAM(s) IntraMuscular once PRN Glucose LESS THAN 70 milligrams/deciliter      LABS:	 	    CARDIAC MARKERS:  CARDIAC MARKERS ( 19 Jan 2018 06:45 )  x     / 0.28 ng/mL / 114 u/L / x     / x      CARDIAC MARKERS ( 18 Jan 2018 20:18 )  x     / 0.26 ng/mL / 142 u/L / 4.84 ng/mL / x                                    10.1   9.95  )-----------( 295      ( 20 Jan 2018 06:06 )             31.5     01-20    142  |  105  |  34<H>  ----------------------------<  207<H>  4.0   |  21<L>  |  1.53<H>    Ca    8.6      20 Jan 2018 06:06  Phos  2.8     01-19  Mg     1.9     01-19    TPro  7.6  /  Alb  3.9  /  TBili  0.2  /  DBili  x   /  AST  26  /  ALT  14  /  AlkPhos  60  01-18    COAGS:       proBNP: Serum Pro-Brain Natriuretic Peptide: 1309 pg/mL (01-20 @ 06:06)    Lipid Profile:   HgA1c:   TSH:       PHYSICAL EXAM:  T(C): 36.6 (01-20-18 @ 12:57), Max: 36.8 (01-20-18 @ 06:34)  HR: 82 (01-20-18 @ 12:57) (80 - 88)  BP: 120/66 (01-20-18 @ 12:57) (109/64 - 122/73)  RR: 18 (01-20-18 @ 12:57) (18 - 20)  SpO2: 99% (01-20-18 @ 12:57) (98% - 100%)  Wt(kg): --  I&O's Summary    19 Jan 2018 07:01  -  20 Jan 2018 07:00  --------------------------------------------------------  IN: 360 mL / OUT: 700 mL / NET: -340 mL    20 Jan 2018 07:01  -  20 Jan 2018 15:30  --------------------------------------------------------  IN: 240 mL / OUT: 0 mL / NET: 240 mL    Cardiovascular: Normal S1 S2,  RRR  No JVD, 1/6 YUSUF murmur, Peripheral pulses palpable 2+ bilaterally  Respiratory: decreased breath sounds B/L LL's 	  Gastrointestinal:  Soft, Non-tender, + BS	  Extremities + edema, no  cyanosis,  no clubbing B/L LE's       TELEMETRY: SR 	    ECG:  	  RADIOLOGY:   CT chest   < from: CT Chest No Cont (01.20.18 @ 09:55) >  IMPRESSION:   Mild pulmonary edema with small simple bilateral pleural effusions, right   greater than left, with no evidence of loculation.    < end of copied text >     CXR   < from: Xray Chest 1 View AP-PORTABLE IMMEDIATE (01.18.18 @ 21:02) >  IMPRESSION:    Development of CHF.    < end of copied text >      DIAGNOSTIC TESTING:  [ ] Echocardiogram:   [ ]  Catheterization:  [ ] Stress Test:    OTHER: 	      ASSESSMENT/PLAN: 	72y Female w/ PMH HTN, CKD, Hyperlipidemia, DM-II, CAD s/p 3V CABG  (LIMA to LAD, SVG to OM, SVG to PDA) at [facility] 2014, s/p CORNELIA TO RI 11/17/17 who was recently admitted to [facility] in the beginning of December with NSTEMI s/p CORNELIA to ramus. She was started on Brilinta and ASA.  TTE at that time revealed grossly moderate to severe LV dysfunction with hypokinesis of the inferior, lateral and inferolateral with an EF of 36%.   She is now readmitted with c/o progressively worsening shortness of breath.   Her daughter is at bedside translating. They deny any medication noncompliance.   no acute ischemia on EKG.  medicine input appreciated   renal input appreciate '  endo input appreciated   mildly elevated trops noted in setting of CKD & CHF exacerbation  - no need for urgent repeat cath at this time  CT chest w/ mild pulm edema  small simple B/L pleural effusions   diurese with IV Lasix     monitor cr & K levels keep I's<O's   c/w DAPT for recent CORNELIA  c/w tele   echo pending   f/u with Dr Quick for cardiology upon DC

## 2018-01-20 NOTE — PROGRESS NOTE ADULT - SUBJECTIVE AND OBJECTIVE BOX
SELVIN CLAIRE  HPI:  This is a patient with CKD who was admitted with dyspnea. She is currently diuresed. No new complaints, feels better today.    HEALTH ISSUES - PROBLEM Dx:  Acidosis: Acidosis  Anemia: Anemia  Stage 3 chronic kidney disease: Stage 3 chronic kidney disease  Need for prophylactic measure: Need for prophylactic measure  GERD (gastroesophageal reflux disease): GERD (gastroesophageal reflux disease)  Hypothyroidism: Hypothyroidism  Hyperlipidemia: Hyperlipidemia  Diabetes mellitus, type 2: Diabetes mellitus, type 2  CHF exacerbation: CHF exacerbation        MEDICATIONS  (STANDING):  aspirin enteric coated 81 milliGRAM(s) Oral daily  atorvastatin 40 milliGRAM(s) Oral at bedtime  dextrose 5%. 1000 milliLiter(s) (50 mL/Hr) IV Continuous <Continuous>  dextrose 50% Injectable 12.5 Gram(s) IV Push once  dextrose 50% Injectable 25 Gram(s) IV Push once  dextrose 50% Injectable 25 Gram(s) IV Push once  famotidine    Tablet 20 milliGRAM(s) Oral daily  furosemide   Injectable 40 milliGRAM(s) IV Push daily  heparin  Injectable 5000 Unit(s) SubCutaneous every 12 hours  insulin glargine Injectable (LANTUS) 56 Unit(s) SubCutaneous at bedtime  insulin lispro (HumaLOG) corrective regimen sliding scale   SubCutaneous three times a day before meals  insulin lispro (HumaLOG) corrective regimen sliding scale   SubCutaneous at bedtime  insulin lispro Injectable (HumaLOG) 14 Unit(s) SubCutaneous three times a day before meals  levothyroxine 50 MICROGram(s) Oral daily  metoprolol succinate ER 12.5 milliGRAM(s) Oral daily  montelukast 10 milliGRAM(s) Oral at bedtime  sodium chloride 0.9% lock flush 3 milliLiter(s) IV Push every 8 hours  ticagrelor 90 milliGRAM(s) Oral two times a day    MEDICATIONS  (PRN):  dextrose Gel 1 Dose(s) Oral once PRN Blood Glucose LESS THAN 70 milliGRAM(s)/deciliter  glucagon  Injectable 1 milliGRAM(s) IntraMuscular once PRN Glucose LESS THAN 70 milligrams/deciliter    Vital Signs Last 24 Hrs  T(C): 36.6 (2018 12:57), Max: 36.8 (2018 06:34)  T(F): 97.9 (2018 12:57), Max: 98.2 (2018 06:34)  HR: 82 (2018 12:57) (80 - 88)  BP: 120/66 (2018 12:57) (119/71 - 122/73)  BP(mean): --  RR: 18 (2018 12:57) (18 - 20)  SpO2: 99% (2018 12:57) (99% - 100%)  Daily     Daily Weight in k.8 (2018 12:15)    PHYSICAL EXAM:  Constitutional: She appears comfortable and not distressed. Not diaphoretic.    Neck:  The thyroid is normal. Trachea is midline.     Breasts: Normal examination.    Respiratory: The lungs are clear to auscultation. No dullness and expansion is normal.    Cardiovascular: S1 and S2 are normal. No mummurs, rubs or gallops are present.    Gastrointestinal: The abdomen is soft. No tenderness is present. No masses are present. Bowel sounds are normal.    Genitourinary: The bladder is not distended. No CVA tenderness is present.    Extremities: No edema is noted. No deformities are present.    Neurological: Cognition is normal. Tone, power and sensation are normal.     Skin: No leasions are seen  or palpated.    Lymph Nodes: No lymphadenopathy is present.    Psychiatric: Mood is appropriate. No hallucinations or flight of ideas are noted.                              10.1   9.95  )-----------( 295      ( 2018 06:06 )             31.5     01-20    142  |  105  |  34<H>  ----------------------------<  207<H>  4.0   |  21<L>  |  1.53<H>    Ca    8.6      2018 06:06  Phos  2.8     -  Mg     1.9     -    TPro  7.6  /  Alb  3.9  /  TBili  0.2  /  DBili  x   /  AST  26  /  ALT  14  /  AlkPhos  60

## 2018-01-20 NOTE — PROGRESS NOTE ADULT - SUBJECTIVE AND OBJECTIVE BOX
Chief complaint  Patient is a 72y old  Female who presents with a chief complaint of SOB (19 Jan 2018 09:48)   Review of systems  Patient in bed, looks comfortable, no fever,  no hypoglycemia.    Labs and Fingersticks  CAPILLARY BLOOD GLUCOSE      POCT Blood Glucose.: 180 mg/dL (20 Jan 2018 17:31)  POCT Blood Glucose.: 287 mg/dL (20 Jan 2018 12:56)  POCT Blood Glucose.: 333 mg/dL (20 Jan 2018 08:48)  POCT Blood Glucose.: 342 mg/dL (19 Jan 2018 21:24)          Calcium, Total Serum: 8.6 (01-20 @ 06:06)  Calcium, Total Serum: 9.0 (01-19 @ 06:45)          01-20    142  |  105  |  34<H>  ----------------------------<  207<H>  4.0   |  21<L>  |  1.53<H>    Ca    8.6      20 Jan 2018 06:06  Phos  2.8     01-19  Mg     1.9     01-19                          10.1   9.95  )-----------( 295      ( 20 Jan 2018 06:06 )             31.5     Medications  MEDICATIONS  (STANDING):  aspirin enteric coated 81 milliGRAM(s) Oral daily  atorvastatin 40 milliGRAM(s) Oral at bedtime  dextrose 5%. 1000 milliLiter(s) (50 mL/Hr) IV Continuous <Continuous>  dextrose 50% Injectable 12.5 Gram(s) IV Push once  dextrose 50% Injectable 25 Gram(s) IV Push once  dextrose 50% Injectable 25 Gram(s) IV Push once  famotidine    Tablet 20 milliGRAM(s) Oral daily  furosemide   Injectable 40 milliGRAM(s) IV Push daily  heparin  Injectable 5000 Unit(s) SubCutaneous every 12 hours  insulin glargine Injectable (LANTUS) 56 Unit(s) SubCutaneous at bedtime  insulin lispro (HumaLOG) corrective regimen sliding scale   SubCutaneous three times a day before meals  insulin lispro (HumaLOG) corrective regimen sliding scale   SubCutaneous at bedtime  insulin lispro Injectable (HumaLOG) 14 Unit(s) SubCutaneous three times a day before meals  levothyroxine 50 MICROGram(s) Oral daily  metoprolol succinate ER 12.5 milliGRAM(s) Oral daily  montelukast 10 milliGRAM(s) Oral at bedtime  sodium chloride 0.9% lock flush 3 milliLiter(s) IV Push every 8 hours  ticagrelor 90 milliGRAM(s) Oral two times a day      Physical Exam  General: Patient comfortable in bed  Vital Signs Last 12 Hrs  T(F): 97.9 (01-20-18 @ 12:57), Max: 97.9 (01-20-18 @ 12:57)  HR: 82 (01-20-18 @ 12:57) (82 - 82)  BP: 120/66 (01-20-18 @ 12:57) (120/66 - 120/66)  BP(mean): --  RR: 18 (01-20-18 @ 12:57) (18 - 18)  SpO2: 99% (01-20-18 @ 12:57) (99% - 99%)  Neck: No palpable thyroid nodules.  CVS: S1S2, No murmurs  Respiratory: No wheezing, no crepitations  GI: Abdomen soft, bowel sounds positive  Musculoskeletal: Positive edema lower extremities.   Skin: No skin rashes, no ecchymosis    Diagnostics

## 2018-01-20 NOTE — DIETITIAN INITIAL EVALUATION ADULT. - NS AS NUTRI INTERV MEALS SNACK
Diets modified for specific foods and ingredients/1. Suggest: PO diet rx: Mechanical Soft, Consistent Carbohydrate (no snacks), DASH/TLC (cholesterol and sodium restricted); Low Fat;           2. Encourage & assist Pt with meals; Monitor PO diet tolerance; Honor food preferences;          3. Suggest: Laxatives PRN per MD;          4. Monitor labs, weights, hydration status;/Other (specify) Other (specify)/Diets modified for specific foods and ingredients/1. Suggest: PO diet rx: Soft, Consistent Carbohydrate (no snacks), DASH/TLC (cholesterol and sodium restricted); Low Fat;           2. Encourage & assist Pt with meals; Monitor PO diet tolerance; Honor food preferences;          3. Suggest: Laxatives PRN per MD;          4. Monitor labs, weights, hydration status;

## 2018-01-20 NOTE — PROGRESS NOTE ADULT - ASSESSMENT
71 Female, with a PmHx of HTN, CKD, Hyperlipidemia, DM-II, CAD s/p CABG x 3, s/p stents, presenting to the [facility] ED c/o shortness of breath. Pt is being admitted to telemetry for acute on chronic systolic heart failure.    Problem/Plan - 1:  ·  Problem: CHF exacerbation.  Plan: telemonitor   strict I & O's, daily wts  Fluid restriction  diuresis as per cards  cards and pulm fu  check TTE    Problem/Plan - 2:  ·  Problem: Diabetes mellitus, type 2.  Plan: monitor fs   basal/bolus     Problem/Plan - 3:  ·  Problem: Hyperlipidemia.  Plan: cw statin    Problem/Plan - 4:  ·  Problem: Hypothyroidism.  Plan: cw levothyroxine.

## 2018-01-20 NOTE — DIETITIAN INITIAL EVALUATION ADULT. - SIGNS/SYMPTOMS
As evidenced by elevated BUN & Creat, hyperglycemia As evidenced by elevated BUN & Creat, elevated Glucose level Pt C/O chewing difficulties

## 2018-01-20 NOTE — DIETITIAN INITIAL EVALUATION ADULT. - PERTINENT LABORATORY DATA
(1/20) H/h 10.1/31.5 l, BUN 34 H, Creat 1.53 H, Glu 207 H;            (1/19) triglycerides 246 H, HDL 30 L;           (1/18) Albumin 3.9 (1/20) H/H10.1/31.5 l, BUN 34 H, Creat 1.53 H, Glu 207 H;            (1/19) triglycerides 246 H, HDL 30 L;           (1/18) Albumin 3.9

## 2018-01-20 NOTE — DIETITIAN INITIAL EVALUATION ADULT. - DIET TYPE
DASH/TLC (sodium and cholesterol restricted diet)/60 gm protein/consistent carbohydrate (no snacks)/regular/low sodium

## 2018-01-21 LAB
BUN SERPL-MCNC: 40 MG/DL — HIGH (ref 7–23)
CALCIUM SERPL-MCNC: 9.3 MG/DL — SIGNIFICANT CHANGE UP (ref 8.4–10.5)
CHLORIDE SERPL-SCNC: 102 MMOL/L — SIGNIFICANT CHANGE UP (ref 98–107)
CO2 SERPL-SCNC: 18 MMOL/L — LOW (ref 22–31)
CREAT SERPL-MCNC: 1.64 MG/DL — HIGH (ref 0.5–1.3)
GLUCOSE BLDC GLUCOMTR-MCNC: 151 MG/DL — HIGH (ref 70–99)
GLUCOSE BLDC GLUCOMTR-MCNC: 178 MG/DL — HIGH (ref 70–99)
GLUCOSE BLDC GLUCOMTR-MCNC: 183 MG/DL — HIGH (ref 70–99)
GLUCOSE BLDC GLUCOMTR-MCNC: 257 MG/DL — HIGH (ref 70–99)
GLUCOSE SERPL-MCNC: 156 MG/DL — HIGH (ref 70–99)
HCT VFR BLD CALC: 36.4 % — SIGNIFICANT CHANGE UP (ref 34.5–45)
HGB BLD-MCNC: 11.1 G/DL — LOW (ref 11.5–15.5)
MAGNESIUM SERPL-MCNC: 2 MG/DL — SIGNIFICANT CHANGE UP (ref 1.6–2.6)
MCHC RBC-ENTMCNC: 26.2 PG — LOW (ref 27–34)
MCHC RBC-ENTMCNC: 30.5 % — LOW (ref 32–36)
MCV RBC AUTO: 86.1 FL — SIGNIFICANT CHANGE UP (ref 80–100)
NRBC # FLD: 0 — SIGNIFICANT CHANGE UP
PHOSPHATE SERPL-MCNC: 4 MG/DL — SIGNIFICANT CHANGE UP (ref 2.5–4.5)
PLATELET # BLD AUTO: 326 K/UL — SIGNIFICANT CHANGE UP (ref 150–400)
PMV BLD: 9.2 FL — SIGNIFICANT CHANGE UP (ref 7–13)
POTASSIUM SERPL-MCNC: 3.7 MMOL/L — SIGNIFICANT CHANGE UP (ref 3.5–5.3)
POTASSIUM SERPL-SCNC: 3.7 MMOL/L — SIGNIFICANT CHANGE UP (ref 3.5–5.3)
RBC # BLD: 4.23 M/UL — SIGNIFICANT CHANGE UP (ref 3.8–5.2)
RBC # FLD: 14.8 % — HIGH (ref 10.3–14.5)
SODIUM SERPL-SCNC: 139 MMOL/L — SIGNIFICANT CHANGE UP (ref 135–145)
WBC # BLD: 10.52 K/UL — HIGH (ref 3.8–10.5)
WBC # FLD AUTO: 10.52 K/UL — HIGH (ref 3.8–10.5)

## 2018-01-21 RX ORDER — POTASSIUM CHLORIDE 20 MEQ
40 PACKET (EA) ORAL ONCE
Qty: 0 | Refills: 0 | Status: COMPLETED | OUTPATIENT
Start: 2018-01-21 | End: 2018-01-21

## 2018-01-21 RX ORDER — INSULIN LISPRO 100/ML
16 VIAL (ML) SUBCUTANEOUS
Qty: 0 | Refills: 0 | Status: DISCONTINUED | OUTPATIENT
Start: 2018-01-21 | End: 2018-01-23

## 2018-01-21 RX ADMIN — Medication 50 MICROGRAM(S): at 05:28

## 2018-01-21 RX ADMIN — FAMOTIDINE 20 MILLIGRAM(S): 10 INJECTION INTRAVENOUS at 13:28

## 2018-01-21 RX ADMIN — Medication 16 UNIT(S): at 18:17

## 2018-01-21 RX ADMIN — SODIUM CHLORIDE 3 MILLILITER(S): 9 INJECTION INTRAMUSCULAR; INTRAVENOUS; SUBCUTANEOUS at 05:26

## 2018-01-21 RX ADMIN — ATORVASTATIN CALCIUM 40 MILLIGRAM(S): 80 TABLET, FILM COATED ORAL at 22:42

## 2018-01-21 RX ADMIN — TICAGRELOR 90 MILLIGRAM(S): 90 TABLET ORAL at 05:28

## 2018-01-21 RX ADMIN — Medication 6: at 13:27

## 2018-01-21 RX ADMIN — Medication 2: at 09:21

## 2018-01-21 RX ADMIN — SODIUM CHLORIDE 3 MILLILITER(S): 9 INJECTION INTRAMUSCULAR; INTRAVENOUS; SUBCUTANEOUS at 22:16

## 2018-01-21 RX ADMIN — Medication 40 MILLIGRAM(S): at 05:29

## 2018-01-21 RX ADMIN — Medication 40 MILLIEQUIVALENT(S): at 13:27

## 2018-01-21 RX ADMIN — INSULIN GLARGINE 56 UNIT(S): 100 INJECTION, SOLUTION SUBCUTANEOUS at 22:42

## 2018-01-21 RX ADMIN — Medication 14 UNIT(S): at 09:21

## 2018-01-21 RX ADMIN — HEPARIN SODIUM 5000 UNIT(S): 5000 INJECTION INTRAVENOUS; SUBCUTANEOUS at 05:29

## 2018-01-21 RX ADMIN — Medication 12.5 MILLIGRAM(S): at 05:29

## 2018-01-21 RX ADMIN — MONTELUKAST 10 MILLIGRAM(S): 4 TABLET, CHEWABLE ORAL at 22:42

## 2018-01-21 RX ADMIN — TICAGRELOR 90 MILLIGRAM(S): 90 TABLET ORAL at 17:09

## 2018-01-21 RX ADMIN — Medication 81 MILLIGRAM(S): at 13:28

## 2018-01-21 RX ADMIN — Medication 14 UNIT(S): at 13:27

## 2018-01-21 RX ADMIN — SODIUM CHLORIDE 3 MILLILITER(S): 9 INJECTION INTRAMUSCULAR; INTRAVENOUS; SUBCUTANEOUS at 13:28

## 2018-01-21 RX ADMIN — HEPARIN SODIUM 5000 UNIT(S): 5000 INJECTION INTRAVENOUS; SUBCUTANEOUS at 17:09

## 2018-01-21 RX ADMIN — Medication 2: at 18:17

## 2018-01-21 NOTE — PROGRESS NOTE ADULT - ASSESSMENT
1.	CHF decompensation, improved. She is on Lasix 40 daily.  2.	CKD 3, stable.  3.	CAD.      PLAN:   1.	Consider switching to PO lasix.   2.	Follow up BMP.  3.	Monitor K and MG, supplementing as needed.

## 2018-01-21 NOTE — PROGRESS NOTE ADULT - ASSESSMENT
Assessment  DMT2: 72y Female with DM T2 with hyperglycemia on insulin, blood sugars high, no hypoglycemia,  eating meals,  non compliant with low carb diet.  Hypothyroidism: On synthroid 50 mcg po daily,  HTN: Controlled, On med.  CHF: On medications, monitored.

## 2018-01-21 NOTE — PROGRESS NOTE ADULT - SUBJECTIVE AND OBJECTIVE BOX
Chief complaint  Patient is a 72y old  Female who presents with a chief complaint of SOB (19 Jan 2018 09:48)   Review of systems  Patient in bed, looks comfortable, no fever, no hypoglycemia.    Labs and Fingersticks  CAPILLARY BLOOD GLUCOSE      POCT Blood Glucose.: 257 mg/dL (21 Jan 2018 12:55)  POCT Blood Glucose.: 183 mg/dL (21 Jan 2018 09:11)  POCT Blood Glucose.: 261 mg/dL (20 Jan 2018 21:41)  POCT Blood Glucose.: 180 mg/dL (20 Jan 2018 17:31)          Calcium, Total Serum: 9.3 (01-21 @ 05:50)  Calcium, Total Serum: 8.6 (01-20 @ 06:06)          01-21    139  |  102  |  40<H>  ----------------------------<  156<H>  3.7   |  18<L>  |  1.64<H>    Ca    9.3      21 Jan 2018 05:50  Phos  4.0     01-21  Mg     2.0     01-21                          11.1   10.52 )-----------( 326      ( 21 Jan 2018 05:50 )             36.4     Medications  MEDICATIONS  (STANDING):  aspirin enteric coated 81 milliGRAM(s) Oral daily  atorvastatin 40 milliGRAM(s) Oral at bedtime  dextrose 5%. 1000 milliLiter(s) (50 mL/Hr) IV Continuous <Continuous>  dextrose 50% Injectable 12.5 Gram(s) IV Push once  dextrose 50% Injectable 25 Gram(s) IV Push once  dextrose 50% Injectable 25 Gram(s) IV Push once  famotidine    Tablet 20 milliGRAM(s) Oral daily  furosemide   Injectable 40 milliGRAM(s) IV Push daily  heparin  Injectable 5000 Unit(s) SubCutaneous every 12 hours  insulin glargine Injectable (LANTUS) 56 Unit(s) SubCutaneous at bedtime  insulin lispro (HumaLOG) corrective regimen sliding scale   SubCutaneous three times a day before meals  insulin lispro (HumaLOG) corrective regimen sliding scale   SubCutaneous at bedtime  insulin lispro Injectable (HumaLOG) 16 Unit(s) SubCutaneous three times a day before meals  levothyroxine 50 MICROGram(s) Oral daily  metoprolol succinate ER 12.5 milliGRAM(s) Oral daily  montelukast 10 milliGRAM(s) Oral at bedtime  sodium chloride 0.9% lock flush 3 milliLiter(s) IV Push every 8 hours  ticagrelor 90 milliGRAM(s) Oral two times a day      Physical Exam  General: Patient comfortable in bed  Vital Signs Last 12 Hrs  T(F): 97.3 (01-21-18 @ 12:56), Max: 97.3 (01-21-18 @ 12:56)  HR: 86 (01-21-18 @ 12:56) (86 - 86)  BP: 128/70 (01-21-18 @ 12:56) (114/56 - 128/70)  BP(mean): --  RR: 18 (01-21-18 @ 12:56) (18 - 18)  SpO2: 91% (01-21-18 @ 12:56) (91% - 100%)  Neck: No palpable thyroid nodules.  CVS: S1S2, No murmurs  Respiratory: No wheezing, no crepitations  GI: Abdomen soft, bowel sounds positive  Musculoskeletal: Positive edema lower extremities.   Skin: No skin rashes, no ecchymosis    Diagnostics

## 2018-01-21 NOTE — PROGRESS NOTE ADULT - SUBJECTIVE AND OBJECTIVE BOX
SELVIN CLAIRE    HPI:  This is an elderly woman with CHF and CKD. Shew was admitted for dyspnea and chest pains. She is diuresed with improvement. No new complaints at time.  HEALTH ISSUES - PROBLEM Dx:  Acidosis: Acidosis  Anemia: Anemia  Stage 3 chronic kidney disease: Stage 3 chronic kidney disease  Need for prophylactic measure: Need for prophylactic measure  GERD (gastroesophageal reflux disease): GERD (gastroesophageal reflux disease)  Hypothyroidism: Hypothyroidism  Hyperlipidemia: Hyperlipidemia  Diabetes mellitus, type 2: Diabetes mellitus, type 2  CHF exacerbation: CHF exacerbation        MEDICATIONS  (STANDING):  aspirin enteric coated 81 milliGRAM(s) Oral daily  atorvastatin 40 milliGRAM(s) Oral at bedtime  dextrose 5%. 1000 milliLiter(s) (50 mL/Hr) IV Continuous <Continuous>  dextrose 50% Injectable 12.5 Gram(s) IV Push once  dextrose 50% Injectable 25 Gram(s) IV Push once  dextrose 50% Injectable 25 Gram(s) IV Push once  famotidine    Tablet 20 milliGRAM(s) Oral daily  furosemide   Injectable 40 milliGRAM(s) IV Push daily  heparin  Injectable 5000 Unit(s) SubCutaneous every 12 hours  insulin glargine Injectable (LANTUS) 56 Unit(s) SubCutaneous at bedtime  insulin lispro (HumaLOG) corrective regimen sliding scale   SubCutaneous three times a day before meals  insulin lispro (HumaLOG) corrective regimen sliding scale   SubCutaneous at bedtime  insulin lispro Injectable (HumaLOG) 14 Unit(s) SubCutaneous three times a day before meals  levothyroxine 50 MICROGram(s) Oral daily  metoprolol succinate ER 12.5 milliGRAM(s) Oral daily  montelukast 10 milliGRAM(s) Oral at bedtime  potassium chloride    Tablet ER 40 milliEquivalent(s) Oral once  sodium chloride 0.9% lock flush 3 milliLiter(s) IV Push every 8 hours  ticagrelor 90 milliGRAM(s) Oral two times a day    MEDICATIONS  (PRN):  dextrose Gel 1 Dose(s) Oral once PRN Blood Glucose LESS THAN 70 milliGRAM(s)/deciliter  glucagon  Injectable 1 milliGRAM(s) IntraMuscular once PRN Glucose LESS THAN 70 milligrams/deciliter    Vital Signs Last 24 Hrs  T(C): 36.2 (21 Jan 2018 05:26), Max: 36.7 (20 Jan 2018 22:29)  T(F): 97.2 (21 Jan 2018 05:26), Max: 98 (20 Jan 2018 22:29)  HR: 86 (21 Jan 2018 05:26) (82 - 86)  BP: 114/56 (21 Jan 2018 05:26) (114/56 - 122/65)  BP(mean): --  RR: 18 (21 Jan 2018 05:26) (18 - 18)  SpO2: 100% (21 Jan 2018 05:26) (99% - 100%)  Daily     Daily     PHYSICAL EXAM:  Constitutional: She  appears comfortable and not distressed. Not diaphoretic.    Neck:  The thyroid is normal. Trachea is midline.     Breasts: Normal examination.    Respiratory: The lungs are clear to auscultation. No dullness and expansion is normal.    Cardiovascular: S1 and S2 are normal. No mummurs, rubs or gallops are present.    Gastrointestinal: The abdomen is soft. No tenderness is present. No masses are present. Bowel sounds are normal.    Genitourinary: The bladder is not distended. No CVA tenderness is present.    Extremities: No edema is noted. No deformities are present.    Neurological: Cognition is normal. Tone, power and sensation are normal. Gait is steady.    Skin: No leasions are seen  or palpated.    Lymph Nodes: No lymphadenopathy is present.    Psychiatric: Mood is appropriate. No hallucinations or flight of ideas are noted.                              11.1   10.52 )-----------( 326      ( 21 Jan 2018 05:50 )             36.4     01-21    139  |  102  |  40<H>  ----------------------------<  156<H>  3.7   |  18<L>  |  1.64<H>    Ca    9.3      21 Jan 2018 05:50  Phos  4.0     01-21  Mg     2.0     01-21    < from: TTE with Doppler (w/Cont) (11.25.17 @ 12:33) >  ------------------------------------------------------------------------  CONCLUSIONS:  1. Mitral annular calcification, otherwise normal mitral  valve. Mild mitral regurgitation.  2. Normal left ventricular internal dimensions and wall  thicknesses.  3. Endocardium not well visualized; grossly moderate to  severe segmental left ventricularsystolic dysfunction.  Hypokinesis of the inferior, lateral, and inferolateral  walls.  Endocardial visualization enhanced with intravenous  injection of echo contrast (Definity).  No LV thrombus  seen.  4. The right ventricle is not well visualized;grossly  normal right ventricular systolic function.    < end of copied text >

## 2018-01-21 NOTE — PROGRESS NOTE ADULT - SUBJECTIVE AND OBJECTIVE BOX
Patient is a 72y old  Female who presents with a chief complaint of SOB (19 Jan 2018 09:48)  NAD      INTERVAL HPI/OVERNIGHT EVENTS:  T(C): 36.3 (01-21-18 @ 12:56), Max: 36.7 (01-20-18 @ 22:29)  HR: 86 (01-21-18 @ 12:56) (82 - 86)  BP: 128/70 (01-21-18 @ 12:56) (114/56 - 128/70)  RR: 18 (01-21-18 @ 12:56) (18 - 18)  SpO2: 91% (01-21-18 @ 12:56) (91% - 100%)  Wt(kg): --  I&O's Summary    20 Jan 2018 07:01  -  21 Jan 2018 07:00  --------------------------------------------------------  IN: 740 mL / OUT: 0 mL / NET: 740 mL    21 Jan 2018 07:01  -  21 Jan 2018 18:10  --------------------------------------------------------  IN: 240 mL / OUT: 0 mL / NET: 240 mL        LABS:                        11.1   10.52 )-----------( 326      ( 21 Jan 2018 05:50 )             36.4     01-21    139  |  102  |  40<H>  ----------------------------<  156<H>  3.7   |  18<L>  |  1.64<H>    Ca    9.3      21 Jan 2018 05:50  Phos  4.0     01-21  Mg     2.0     01-21          CAPILLARY BLOOD GLUCOSE      POCT Blood Glucose.: 151 mg/dL (21 Jan 2018 17:43)  POCT Blood Glucose.: 257 mg/dL (21 Jan 2018 12:55)  POCT Blood Glucose.: 183 mg/dL (21 Jan 2018 09:11)  POCT Blood Glucose.: 261 mg/dL (20 Jan 2018 21:41)            MEDICATIONS  (STANDING):  aspirin enteric coated 81 milliGRAM(s) Oral daily  atorvastatin 40 milliGRAM(s) Oral at bedtime  dextrose 5%. 1000 milliLiter(s) (50 mL/Hr) IV Continuous <Continuous>  dextrose 50% Injectable 12.5 Gram(s) IV Push once  dextrose 50% Injectable 25 Gram(s) IV Push once  dextrose 50% Injectable 25 Gram(s) IV Push once  famotidine    Tablet 20 milliGRAM(s) Oral daily  furosemide   Injectable 40 milliGRAM(s) IV Push daily  heparin  Injectable 5000 Unit(s) SubCutaneous every 12 hours  insulin glargine Injectable (LANTUS) 56 Unit(s) SubCutaneous at bedtime  insulin lispro (HumaLOG) corrective regimen sliding scale   SubCutaneous three times a day before meals  insulin lispro (HumaLOG) corrective regimen sliding scale   SubCutaneous at bedtime  insulin lispro Injectable (HumaLOG) 16 Unit(s) SubCutaneous three times a day before meals  levothyroxine 50 MICROGram(s) Oral daily  metoprolol succinate ER 12.5 milliGRAM(s) Oral daily  montelukast 10 milliGRAM(s) Oral at bedtime  sodium chloride 0.9% lock flush 3 milliLiter(s) IV Push every 8 hours  ticagrelor 90 milliGRAM(s) Oral two times a day    MEDICATIONS  (PRN):  dextrose Gel 1 Dose(s) Oral once PRN Blood Glucose LESS THAN 70 milliGRAM(s)/deciliter  glucagon  Injectable 1 milliGRAM(s) IntraMuscular once PRN Glucose LESS THAN 70 milligrams/deciliter          PHYSICAL EXAM:  GENERAL: NAD, well-groomed, well-developed  HEAD:  Atraumatic, Normocephalic  CHEST/LUNG: Clear to percussion bilaterally; No rales, rhonchi, wheezing, or rubs  HEART: Regular rate and rhythm; No murmurs, rubs, or gallops  ABDOMEN: Soft, Nontender, Nondistended; Bowel sounds present  EXTREMITIES:  2+ Peripheral Pulses, No clubbing, cyanosis, or edema  LYMPH: No lymphadenopathy noted  SKIN: No rashes or lesions    Care Discussed with Consultants/Other Providers [ ] YES  [ ] NO

## 2018-01-21 NOTE — PROGRESS NOTE ADULT - ASSESSMENT
71 Female, with a PmHx of HTN, CKD, Hyperlipidemia, DM-II, CAD s/p CABG x 3, s/p stents, presenting to the [facility] ED c/o shortness of breath. Pt is being admitted to telemetry for acute on chronic systolic heart failure.    Problem/Plan - 1:  ·  Problem: CHF exacerbation.  Plan: telemonitor   strict I & O's, daily wts  Fluid restriction  diuresis as per cards  cards and pulm fu  check TTE  no need for cath as per cards    Problem/Plan - 2:  ·  Problem: Diabetes mellitus, type 2.  Plan: monitor fs   basal/bolus     Problem/Plan - 3:  ·  Problem: Hyperlipidemia.  Plan: cw statin    Problem/Plan - 4:  ·  Problem: Hypothyroidism.  Plan: cw levothyroxine.

## 2018-01-21 NOTE — PROGRESS NOTE ADULT - SUBJECTIVE AND OBJECTIVE BOX
Subjective:   	 no chest pain   shortness of breath   w/ some improvement     MEDICATIONS  (STANDING):  aspirin enteric coated 81 milliGRAM(s) Oral daily  atorvastatin 40 milliGRAM(s) Oral at bedtime  dextrose 5%. 1000 milliLiter(s) (50 mL/Hr) IV Continuous <Continuous>  dextrose 50% Injectable 12.5 Gram(s) IV Push once  dextrose 50% Injectable 25 Gram(s) IV Push once  dextrose 50% Injectable 25 Gram(s) IV Push once  famotidine    Tablet 20 milliGRAM(s) Oral daily  furosemide   Injectable 40 milliGRAM(s) IV Push daily  heparin  Injectable 5000 Unit(s) SubCutaneous every 12 hours  insulin glargine Injectable (LANTUS) 56 Unit(s) SubCutaneous at bedtime  insulin lispro (HumaLOG) corrective regimen sliding scale   SubCutaneous three times a day before meals  insulin lispro (HumaLOG) corrective regimen sliding scale   SubCutaneous at bedtime  insulin lispro Injectable (HumaLOG) 14 Unit(s) SubCutaneous three times a day before meals  levothyroxine 50 MICROGram(s) Oral daily  metoprolol succinate ER 12.5 milliGRAM(s) Oral daily  montelukast 10 milliGRAM(s) Oral at bedtime  potassium chloride    Tablet ER 40 milliEquivalent(s) Oral once  sodium chloride 0.9% lock flush 3 milliLiter(s) IV Push every 8 hours  ticagrelor 90 milliGRAM(s) Oral two times a day    MEDICATIONS  (PRN):  dextrose Gel 1 Dose(s) Oral once PRN Blood Glucose LESS THAN 70 milliGRAM(s)/deciliter  glucagon  Injectable 1 milliGRAM(s) IntraMuscular once PRN Glucose LESS THAN 70 milligrams/deciliter      LABS:                        11.1   10.52 )-----------( 326      ( 21 Jan 2018 05:50 )             36.4     Hemoglobin: 11.1 g/dL (01-21 @ 05:50)  Hemoglobin: 10.1 g/dL (01-20 @ 06:06)  Hemoglobin: 9.8 g/dL (01-19 @ 06:45)  Hemoglobin: 10.5 g/dL (01-18 @ 20:18)    01-21    139  |  102  |  40<H>  ----------------------------<  156<H>  3.7   |  18<L>  |  1.64<H>    Ca    9.3      21 Jan 2018 05:50  Phos  4.0     01-21  Mg     2.0     01-21      Creatinine Trend: 1.64<--, 1.53<--, 1.68<--, 1.45<--     CARDIAC MARKERS ( 19 Jan 2018 06:45 )  x     / 0.28 ng/mL / 114 u/L / x     / x      CARDIAC MARKERS ( 18 Jan 2018 20:18 )  x     / 0.26 ng/mL / 142 u/L / 4.84 ng/mL / x          PHYSICAL EXAM  Vital Signs Last 24 Hrs  T(C): 36.2 (21 Jan 2018 05:26), Max: 36.7 (20 Jan 2018 22:29)  T(F): 97.2 (21 Jan 2018 05:26), Max: 98 (20 Jan 2018 22:29)  HR: 86 (21 Jan 2018 05:26) (82 - 86)  BP: 114/56 (21 Jan 2018 05:26) (114/56 - 122/65)  BP(mean): --  RR: 18 (21 Jan 2018 05:26) (18 - 18)  SpO2: 100% (21 Jan 2018 05:26) (99% - 100%)    Cardiovascular: Normal S1 S2,  RRR  No JVD, 1/6 YUSUF murmur, Peripheral pulses palpable 2+ bilaterally  Respiratory: decreased breath sounds B/L LL's 	  Gastrointestinal:  Soft, Non-tender, + BS	  Extremities + edema dependent position , no  cyanosis,  no clubbing B/L LE's       TELEMETRY: SR 	    ECG:  	  RADIOLOGY:   CT chest   < from: CT Chest No Cont (01.20.18 @ 09:55) >  IMPRESSION:   Mild pulmonary edema with small simple bilateral pleural effusions, right   greater than left, with no evidence of loculation.    < end of copied text >     CXR   < from: Xray Chest 1 View AP-PORTABLE IMMEDIATE (01.18.18 @ 21:02) >  IMPRESSION:    Development of CHF.    < end of copied text >      DIAGNOSTIC TESTING:  [ ] Echocardiogram:     [ ]  Catheterization:  [ ] Stress Test:    OTHER: 	      ASSESSMENT/PLAN: 	72y Female w/ PMH HTN, CKD, Hyperlipidemia, DM-II, CAD s/p 3V CABG  (LIMA to LAD, SVG to OM, SVG to PDA) at [facility] 2014, s/p CORNELIA TO RI 11/17/17 who was recently admitted to [facility] in the beginning of December with NSTEMI s/p CORNELIA to ramus. She was started on Brilinta and ASA.  TTE at that time revealed grossly moderate to severe LV dysfunction with hypokinesis of the inferior, lateral and inferolateral with an EF of 36%.   She is now readmitted with c/o progressively worsening shortness of breath.   Her daughter is at bedside translating. They deny any medication noncompliance.   no acute ischemia on EKG.  medicine input appreciated   F/U renal recommendations   endo input appreciated   mildly elevated trops noted in setting of CKD & CHF exacerbation  - no need for urgent repeat cath at this time  CT chest w/ mild pulm edema  small simple B/L pleural effusions   diurese with IV Lasix     monitor cr & K levels keep I's<O's   c/w DAPT for recent CORNELIA  c/w tele   echo pending   f/u with Dr Quick for cardiology upon DC

## 2018-01-22 LAB
BUN SERPL-MCNC: 52 MG/DL — HIGH (ref 7–23)
CALCIUM SERPL-MCNC: 9.8 MG/DL — SIGNIFICANT CHANGE UP (ref 8.4–10.5)
CHLORIDE SERPL-SCNC: 100 MMOL/L — SIGNIFICANT CHANGE UP (ref 98–107)
CO2 SERPL-SCNC: 20 MMOL/L — LOW (ref 22–31)
CREAT SERPL-MCNC: 2.06 MG/DL — HIGH (ref 0.5–1.3)
GLUCOSE BLDC GLUCOMTR-MCNC: 160 MG/DL — HIGH (ref 70–99)
GLUCOSE BLDC GLUCOMTR-MCNC: 180 MG/DL — HIGH (ref 70–99)
GLUCOSE BLDC GLUCOMTR-MCNC: 214 MG/DL — HIGH (ref 70–99)
GLUCOSE BLDC GLUCOMTR-MCNC: 313 MG/DL — HIGH (ref 70–99)
GLUCOSE SERPL-MCNC: 212 MG/DL — HIGH (ref 70–99)
HCT VFR BLD CALC: 32.1 % — LOW (ref 34.5–45)
HGB BLD-MCNC: 10 G/DL — LOW (ref 11.5–15.5)
MAGNESIUM SERPL-MCNC: 2.1 MG/DL — SIGNIFICANT CHANGE UP (ref 1.6–2.6)
MCHC RBC-ENTMCNC: 26.1 PG — LOW (ref 27–34)
MCHC RBC-ENTMCNC: 31.2 % — LOW (ref 32–36)
MCV RBC AUTO: 83.8 FL — SIGNIFICANT CHANGE UP (ref 80–100)
NRBC # FLD: 0 — SIGNIFICANT CHANGE UP
PHOSPHATE SERPL-MCNC: 4.3 MG/DL — SIGNIFICANT CHANGE UP (ref 2.5–4.5)
PLATELET # BLD AUTO: 310 K/UL — SIGNIFICANT CHANGE UP (ref 150–400)
PMV BLD: 9.2 FL — SIGNIFICANT CHANGE UP (ref 7–13)
POTASSIUM SERPL-MCNC: 4 MMOL/L — SIGNIFICANT CHANGE UP (ref 3.5–5.3)
POTASSIUM SERPL-SCNC: 4 MMOL/L — SIGNIFICANT CHANGE UP (ref 3.5–5.3)
RBC # BLD: 3.83 M/UL — SIGNIFICANT CHANGE UP (ref 3.8–5.2)
RBC # FLD: 14.5 % — SIGNIFICANT CHANGE UP (ref 10.3–14.5)
SODIUM SERPL-SCNC: 136 MMOL/L — SIGNIFICANT CHANGE UP (ref 135–145)
WBC # BLD: 9.85 K/UL — SIGNIFICANT CHANGE UP (ref 3.8–10.5)
WBC # FLD AUTO: 9.85 K/UL — SIGNIFICANT CHANGE UP (ref 3.8–10.5)

## 2018-01-22 PROCEDURE — 93010 ELECTROCARDIOGRAM REPORT: CPT

## 2018-01-22 RX ADMIN — SODIUM CHLORIDE 3 MILLILITER(S): 9 INJECTION INTRAMUSCULAR; INTRAVENOUS; SUBCUTANEOUS at 21:22

## 2018-01-22 RX ADMIN — Medication 2: at 13:13

## 2018-01-22 RX ADMIN — Medication 8: at 18:20

## 2018-01-22 RX ADMIN — Medication 16 UNIT(S): at 13:14

## 2018-01-22 RX ADMIN — TICAGRELOR 90 MILLIGRAM(S): 90 TABLET ORAL at 18:21

## 2018-01-22 RX ADMIN — Medication 16 UNIT(S): at 09:07

## 2018-01-22 RX ADMIN — TICAGRELOR 90 MILLIGRAM(S): 90 TABLET ORAL at 06:03

## 2018-01-22 RX ADMIN — Medication 81 MILLIGRAM(S): at 09:07

## 2018-01-22 RX ADMIN — Medication 40 MILLIGRAM(S): at 06:03

## 2018-01-22 RX ADMIN — Medication 4: at 09:06

## 2018-01-22 RX ADMIN — FAMOTIDINE 20 MILLIGRAM(S): 10 INJECTION INTRAVENOUS at 09:07

## 2018-01-22 RX ADMIN — HEPARIN SODIUM 5000 UNIT(S): 5000 INJECTION INTRAVENOUS; SUBCUTANEOUS at 18:20

## 2018-01-22 RX ADMIN — Medication 12.5 MILLIGRAM(S): at 06:03

## 2018-01-22 RX ADMIN — INSULIN GLARGINE 56 UNIT(S): 100 INJECTION, SOLUTION SUBCUTANEOUS at 22:32

## 2018-01-22 RX ADMIN — SODIUM CHLORIDE 3 MILLILITER(S): 9 INJECTION INTRAMUSCULAR; INTRAVENOUS; SUBCUTANEOUS at 10:47

## 2018-01-22 RX ADMIN — Medication 50 MICROGRAM(S): at 06:03

## 2018-01-22 RX ADMIN — ATORVASTATIN CALCIUM 40 MILLIGRAM(S): 80 TABLET, FILM COATED ORAL at 21:22

## 2018-01-22 RX ADMIN — Medication 16 UNIT(S): at 18:21

## 2018-01-22 RX ADMIN — HEPARIN SODIUM 5000 UNIT(S): 5000 INJECTION INTRAVENOUS; SUBCUTANEOUS at 06:03

## 2018-01-22 RX ADMIN — MONTELUKAST 10 MILLIGRAM(S): 4 TABLET, CHEWABLE ORAL at 21:21

## 2018-01-22 RX ADMIN — SODIUM CHLORIDE 3 MILLILITER(S): 9 INJECTION INTRAMUSCULAR; INTRAVENOUS; SUBCUTANEOUS at 06:03

## 2018-01-22 NOTE — PROGRESS NOTE ADULT - SUBJECTIVE AND OBJECTIVE BOX
Patient is a 72y old  Female who presents with a chief complaint of SOB (19 Jan 2018 09:48)      INTERVAL HPI/OVERNIGHT EVENTS:  T(C): 36.6 (01-22-18 @ 14:33), Max: 36.8 (01-22-18 @ 05:47)  HR: 89 (01-22-18 @ 14:33) (76 - 89)  BP: 119/67 (01-22-18 @ 14:33) (116/65 - 119/67)  RR: 18 (01-22-18 @ 14:33) (18 - 18)  SpO2: 100% (01-22-18 @ 14:33) (94% - 100%)  Wt(kg): --  I&O's Summary    21 Jan 2018 07:01  -  22 Jan 2018 07:00  --------------------------------------------------------  IN: 740 mL / OUT: 300 mL / NET: 440 mL    22 Jan 2018 07:01  -  22 Jan 2018 18:21  --------------------------------------------------------  IN: 450 mL / OUT: 0 mL / NET: 450 mL        LABS:                        10.0   9.85  )-----------( 310      ( 22 Jan 2018 05:00 )             32.1     01-22    136  |  100  |  52<H>  ----------------------------<  212<H>  4.0   |  20<L>  |  2.06<H>    Ca    9.8      22 Jan 2018 05:00  Phos  4.3     01-22  Mg     2.1     01-22          CAPILLARY BLOOD GLUCOSE      POCT Blood Glucose.: 313 mg/dL (22 Jan 2018 17:48)  POCT Blood Glucose.: 160 mg/dL (22 Jan 2018 12:52)  POCT Blood Glucose.: 214 mg/dL (22 Jan 2018 08:53)  POCT Blood Glucose.: 178 mg/dL (21 Jan 2018 21:42)            MEDICATIONS  (STANDING):  aspirin enteric coated 81 milliGRAM(s) Oral daily  atorvastatin 40 milliGRAM(s) Oral at bedtime  dextrose 5%. 1000 milliLiter(s) (50 mL/Hr) IV Continuous <Continuous>  dextrose 50% Injectable 12.5 Gram(s) IV Push once  dextrose 50% Injectable 25 Gram(s) IV Push once  dextrose 50% Injectable 25 Gram(s) IV Push once  famotidine    Tablet 20 milliGRAM(s) Oral daily  furosemide   Injectable 40 milliGRAM(s) IV Push daily  heparin  Injectable 5000 Unit(s) SubCutaneous every 12 hours  insulin glargine Injectable (LANTUS) 56 Unit(s) SubCutaneous at bedtime  insulin lispro (HumaLOG) corrective regimen sliding scale   SubCutaneous three times a day before meals  insulin lispro (HumaLOG) corrective regimen sliding scale   SubCutaneous at bedtime  insulin lispro Injectable (HumaLOG) 16 Unit(s) SubCutaneous three times a day before meals  levothyroxine 50 MICROGram(s) Oral daily  metoprolol succinate ER 12.5 milliGRAM(s) Oral daily  montelukast 10 milliGRAM(s) Oral at bedtime  sodium chloride 0.9% lock flush 3 milliLiter(s) IV Push every 8 hours  ticagrelor 90 milliGRAM(s) Oral two times a day    MEDICATIONS  (PRN):  dextrose Gel 1 Dose(s) Oral once PRN Blood Glucose LESS THAN 70 milliGRAM(s)/deciliter  glucagon  Injectable 1 milliGRAM(s) IntraMuscular once PRN Glucose LESS THAN 70 milligrams/deciliter          PHYSICAL EXAM:  GENERAL: NAD, well-groomed, well-developed  HEAD:  Atraumatic, Normocephalic  CHEST/LUNG: Clear to percussion bilaterally; No rales, rhonchi, wheezing, or rubs  HEART: Regular rate and rhythm; No murmurs, rubs, or gallops  ABDOMEN: Soft, Nontender, Nondistended; Bowel sounds present  EXTREMITIES:  2+ Peripheral Pulses, No clubbing, cyanosis, or edema  LYMPH: No lymphadenopathy noted  SKIN: No rashes or lesions    Care Discussed with Consultants/Other Providers [ ] YES  [ ] NO

## 2018-01-22 NOTE — PROGRESS NOTE ADULT - ASSESSMENT
1.	CHF decompensation,  on Lasix 40 daily.  2.	CKD 3, baseline 1.5-1.8, slightly worsen on lasix  3.	CAD. cardio following      PLAN:   1.	Per daughter pt still feels SOB will continue current lasix for now  2.	Follow up BMP.  3.	f/u cardio

## 2018-01-22 NOTE — PROGRESS NOTE ADULT - SUBJECTIVE AND OBJECTIVE BOX
Subjective:  No CP or SOB  	   MEDICATIONS  (STANDING):  aspirin enteric coated 81 milliGRAM(s) Oral daily  atorvastatin 40 milliGRAM(s) Oral at bedtime  famotidine    Tablet 20 milliGRAM(s) Oral daily  furosemide   Injectable 40 milliGRAM(s) IV Push daily  heparin  Injectable 5000 Unit(s) SubCutaneous every 12 hours  insulin glargine Injectable (LANTUS) 56 Unit(s) SubCutaneous at bedtime  insulin lispro (HumaLOG) corrective regimen sliding scale   SubCutaneous three times a day before meals  insulin lispro (HumaLOG) corrective regimen sliding scale   SubCutaneous at bedtime  insulin lispro Injectable (HumaLOG) 16 Unit(s) SubCutaneous three times a day before meals  levothyroxine 50 MICROGram(s) Oral daily  metoprolol succinate ER 12.5 milliGRAM(s) Oral daily  montelukast 10 milliGRAM(s) Oral at bedtime  sodium chloride 0.9% lock flush 3 milliLiter(s) IV Push every 8 hours  ticagrelor 90 milliGRAM(s) Oral two times a day    LABS:                        10.0   9.85  )-----------( 310      ( 22 Jan 2018 05:00 )             32.1    136  |  100  |  52<H>  ----------------------------<  212<H>  4.0   |  20<L>  |  2.06<H>    Ca    9.8      22 Jan 2018 05:00  Phos  4.3     01-22  Mg     2.1     01-22    Creatinine Trend: 2.06<--, 1.64<--, 1.53<--, 1.68<--, 1.45<--     PHYSICAL EXAM  Vital Signs Last 24 Hrs  T(C): 36.8 (22 Jan 2018 05:47), Max: 36.8 (22 Jan 2018 05:47)  T(F): 98.3 (22 Jan 2018 05:47), Max: 98.3 (22 Jan 2018 05:47)  HR: 79 (22 Jan 2018 09:58) (76 - 89)  BP: 117/70 (22 Jan 2018 09:58) (116/65 - 128/70)  RR: 18 (22 Jan 2018 09:58) (18 - 18)  SpO2: 100% (22 Jan 2018 09:58) (91% - 100%)    Cardiovascular: S1S2 RRR  Respiratory: decreased breath sounds B/L LL's 	  Gastrointestinal:  Soft, Non-tender, + BS	  Extremities + edema B/L LE's     DIAGNOSTIC DATA:     TELEMETRY: SR 	       < from: Cardiac Cath Lab - Adult (10.13.17 @ 10:40) >  VENTRICLES: No left ventriculogram was performed.  CORONARY VESSELS: The coronary circulation is right dominant.  LM:   --  LM: Normal.  LAD:   --  Proximal LAD: There was a diffuse 60 % stenosis.  --  Mid LAD: There was a 100 % stenosis with the distal vessel being filled  via LIMA-LAD.  CX:   --  Circumflex: The vessel was small sized. Angiography showed mild  atherosclerosis withno flow limiting lesions.  --  OM1: Angiography showed mild atherosclerosis with no flow limiting  lesions.  RI:   --  Ostial ramus intermedius: There was a tubular 80 % stenosis.  There was PARUL grade 3 flow through the vessel (brisk flow).  RCA:   --  Proximal RCA: Angiography showed mild atherosclerosis with no  flow limiting lesions.  --  Mid RCA: There was a 100 % stenosis with the distal vessel being filled  via collaterals. This lesion is a chronic total occlusion.  GRAFTS:   --  Graft to the LAD: The graft was a LIMA. Graft angiography  showed minor luminal irregularities. Distal vessel angiography showed  minor luminal irregularities.  AORTA: Ascending aorta: Normal. No additional grafts visualized in  aortogram.  COMPLICATIONS: There were no complications.  DIAGNOSTIC RECOMMENDATIONS: Coronary angiogram demonstrates patent  LIMA-LAD, closed SVG grafts, and a severe stenosis in the ostial Ramus.  Will perform staged PCI to RI when Cr stable and renally optimized.  Prepared and signed by  Corie Ga M.D.  Signed 10/17/2017 13:49:15    < end of copied text >    < from: Cardiac Cath Lab - Adult (11.17.17 @ 11:48) >  PROCEDURE:  --  Intervention on ramus intermedius: drug-eluting stent.    VENTRICLES: No LV gram was performed; however, a recent echocardiogram  demonstrated normal global and regional LV function.  CORONARY VESSELS: The coronary circulation is right dominant.  LM:   --  LM: Angiography showed minor luminal irregularities with no flow  limiting lesions.  LAD:   --  Ostial LAD: There was a 100 % stenosis.  CX:   --  Circumflex: This vessel was not injected, but was visualized  during a prior cardiac catheterization.  RI:   --  Ramus intermedius: There was a 95 % stenosis.  RCA:   --  RCA: This vessel was not injected.  COMPLICATIONS: There were no complications.  DIAGNOSTIC RECOMMENDATIONS: The patient should continue with the present  medications. will add plavix 75mg po once a day/ will add ECASA 325mg po  once day.  Prepared and signed by  Roberta Quick M.D.  Signed 11/17/2017 13:16:01    < end of copied text >    < from: TTE with Doppler (w/Cont) (11.25.17 @ 12:33) >  CONCLUSIONS:  1. Mitral annular calcification, otherwise normal mitral  valve. Mild mitral regurgitation.  2. Normal left ventricular internal dimensions and wall  thicknesses.  3. Endocardium not well visualized; grossly moderate to  severe segmental left ventricularsystolic dysfunction.  Hypokinesis of the inferior, lateral, and inferolateral  walls.  Endocardial visualization enhanced with intravenous  injection of echo contrast (Definity).  No LV thrombus  seen.  4. The right ventricle is not well visualized;grossly  normal right ventricular systolic function.  ------------------------------------------------------------------------  Confirmed on  11/25/2017 - 14:44:41 by Jose Lucia M.D.    < end of copied text >    < from: Cardiac Cath Lab - Adult (12.05.17 @ 12:48) >  VENTRICLES: No LV gram was performed; however, a recent echocardiogram  demonstrated an EF of 36 %.  CORONARY VESSELS: The coronary circulation is right dominant.  LM:   --  Distal left main: Angiography showed minor luminal irregularities  with no flow limiting lesions.  LAD:   --  Mid LAD: There was a 100 % stenosis with the distal vessel being  filled via LIMA-LAD.  CX:   --  Proximal circumflex: There was a tubular 20 % stenosis.  --  Mid circumflex: Angiography showed minor luminal irregularities with no  flow limiting lesions.  --  Distal circumflex: Angiography showed minor luminal irregularities with  no flow limiting lesions.  --  OM1: The vessel was small sized. There was a discrete 60 % stenosis.  RI:   --  Ostial ramus intermedius: Angiography showed minor luminal  irregularities with no flow limiting lesions. There was no significant  restenosis in RI stent.  --  Proximal ramus intermedius: Angiography showed minor luminal  irregularities with no flow limiting lesions.  --  Mid ramus intermedius: There was a tubular 80 % stenosis. The lesion  was associated with a small filling defect consistent with thrombus.  RCA:   --  Mid RCA: There was a 100 % stenosis with the distal vessel being  filled via collaterals from the LAD.  GRAFTS:   --  Graft to the LAD: The graft was a LIMA. Graft angiography  showed minor luminal irregularities. Distal vessel angiography showed  minor luminalirregularities.  COMPLICATIONS: There were no complications.  DIAGNOSTIC RECOMMENDATIONS: Coronary angiogram demonstrates a severe  stenosis in the ramus intermedius that is the cause of the patient's  clinical presentation. Will therefore perform PCI to the RI.  INTERVENTIONAL RECOMMENDATIONS: S/p successful CORNELIA to the Ramus  Intermedius. The patient should continue with ASA and Brilinta for at  least 12 months.  Prepared and signed by  Corie Ga M.D.  Signed 12/06/2017 17:06:30    < from: CT Chest No Cont (01.20.18 @ 09:55) >  IMPRESSION:   Mild pulmonary edema with small simple bilateral pleural effusions, right   greater than left, with no evidence of loculation.    < end of copied text >      ASSESSMENT/PLAN: 	72y Female w/ PMH HTN, CKD, Hyperlipidemia, DM-II, CAD s/p 3V CABG  (LIMA to LAD, SVG to OM, SVG to PDA) at [facility] 2014, s/p CORNELIA TO RI 11/17/17, NSTEMI 12/2017 s/p C on 12/5 and CORNELIA to D Ramus, TTE at that time revealed grossly moderate to severe LV dysfunction with hypokinesis of the inferior, lateral and inferolateral with an EF of 36% admitted with acute on chronic systolic CHF     --Cont lasix, O>I  --mildly elevated trops noted in setting of CKD & CHF exacerbation  - no need for urgent repeat cath at this time  --CT chest noted above  --Repeat TTE  --Cont. DAPT for recent CORNELIA    Juliane Fitch PA-C  [facility]  [address], [address]   [place] [address]  office [phone]  pager [phone]

## 2018-01-22 NOTE — PROGRESS NOTE ADULT - SUBJECTIVE AND OBJECTIVE BOX
Chief complaint  Patient is a 72y old  Female who presents with a chief complaint of SOB (19 Jan 2018 09:48)   Review of systems  Patient in bed, looks comfortable, no fever, no hypoglycemia.    Labs and Fingersticks  CAPILLARY BLOOD GLUCOSE      POCT Blood Glucose.: 160 mg/dL (22 Jan 2018 12:52)  POCT Blood Glucose.: 214 mg/dL (22 Jan 2018 08:53)  POCT Blood Glucose.: 178 mg/dL (21 Jan 2018 21:42)  POCT Blood Glucose.: 151 mg/dL (21 Jan 2018 17:43)          Calcium, Total Serum: 9.8 (01-22 @ 05:00)  Calcium, Total Serum: 9.3 (01-21 @ 05:50)          01-22    136  |  100  |  52<H>  ----------------------------<  212<H>  4.0   |  20<L>  |  2.06<H>    Ca    9.8      22 Jan 2018 05:00  Phos  4.3     01-22  Mg     2.1     01-22                          10.0   9.85  )-----------( 310      ( 22 Jan 2018 05:00 )             32.1     Medications  MEDICATIONS  (STANDING):  aspirin enteric coated 81 milliGRAM(s) Oral daily  atorvastatin 40 milliGRAM(s) Oral at bedtime  dextrose 5%. 1000 milliLiter(s) (50 mL/Hr) IV Continuous <Continuous>  dextrose 50% Injectable 12.5 Gram(s) IV Push once  dextrose 50% Injectable 25 Gram(s) IV Push once  dextrose 50% Injectable 25 Gram(s) IV Push once  famotidine    Tablet 20 milliGRAM(s) Oral daily  furosemide   Injectable 40 milliGRAM(s) IV Push daily  heparin  Injectable 5000 Unit(s) SubCutaneous every 12 hours  insulin glargine Injectable (LANTUS) 56 Unit(s) SubCutaneous at bedtime  insulin lispro (HumaLOG) corrective regimen sliding scale   SubCutaneous three times a day before meals  insulin lispro (HumaLOG) corrective regimen sliding scale   SubCutaneous at bedtime  insulin lispro Injectable (HumaLOG) 16 Unit(s) SubCutaneous three times a day before meals  levothyroxine 50 MICROGram(s) Oral daily  metoprolol succinate ER 12.5 milliGRAM(s) Oral daily  montelukast 10 milliGRAM(s) Oral at bedtime  sodium chloride 0.9% lock flush 3 milliLiter(s) IV Push every 8 hours  ticagrelor 90 milliGRAM(s) Oral two times a day      Physical Exam  General: Patient comfortable in bed  Vital Signs Last 12 Hrs  T(F): 97.9 (01-22-18 @ 14:33), Max: 98.3 (01-22-18 @ 05:47)  HR: 89 (01-22-18 @ 14:33) (76 - 89)  BP: 119/67 (01-22-18 @ 14:33) (116/65 - 119/67)  BP(mean): --  RR: 18 (01-22-18 @ 14:33) (18 - 18)  SpO2: 100% (01-22-18 @ 14:33) (95% - 100%)  Neck: No palpable thyroid nodules.  CVS: S1S2, No murmurs  Respiratory: No wheezing, no crepitations  GI: Abdomen soft, bowel sounds positive  Musculoskeletal: Positive edema lower extremities.   Skin: No skin rashes, no ecchymosis    Diagnostics

## 2018-01-22 NOTE — PROGRESS NOTE ADULT - SUBJECTIVE AND OBJECTIVE BOX
Dr. Muhammad (Nephrology)  Office [phone]  Cell [phone]  [facility]  Cell [phone]      Patient is a 72y old  Female who presents with a chief complaint of SOB (19 Jan 2018 09:48)      Patient seen and examined at bedside. still feels SOB, daughter at bedside    VITALS:  T(F): 98.3 (01-22-18 @ 05:47), Max: 98.3 (01-22-18 @ 05:47)  HR: 79 (01-22-18 @ 09:58)  BP: 117/70 (01-22-18 @ 09:58)  RR: 18 (01-22-18 @ 09:58)  SpO2: 100% (01-22-18 @ 09:58)  Wt(kg): --    01-21 @ 07:01  -  01-22 @ 07:00  --------------------------------------------------------  IN: 740 mL / OUT: 300 mL / NET: 440 mL          PHYSICAL EXAM:  Constitutional: NAD  Neck: No JVD  Respiratory: CTAB, no wheezes, rales or rhonchi  Cardiovascular: S1, S2, RRR  Gastrointestinal: BS+, soft, NT/ND  Extremities: No peripheral edema    Hospital Medications:   MEDICATIONS  (STANDING):  aspirin enteric coated 81 milliGRAM(s) Oral daily  atorvastatin 40 milliGRAM(s) Oral at bedtime  dextrose 5%. 1000 milliLiter(s) (50 mL/Hr) IV Continuous <Continuous>  dextrose 50% Injectable 12.5 Gram(s) IV Push once  dextrose 50% Injectable 25 Gram(s) IV Push once  dextrose 50% Injectable 25 Gram(s) IV Push once  famotidine    Tablet 20 milliGRAM(s) Oral daily  furosemide   Injectable 40 milliGRAM(s) IV Push daily  heparin  Injectable 5000 Unit(s) SubCutaneous every 12 hours  insulin glargine Injectable (LANTUS) 56 Unit(s) SubCutaneous at bedtime  insulin lispro (HumaLOG) corrective regimen sliding scale   SubCutaneous three times a day before meals  insulin lispro (HumaLOG) corrective regimen sliding scale   SubCutaneous at bedtime  insulin lispro Injectable (HumaLOG) 16 Unit(s) SubCutaneous three times a day before meals  levothyroxine 50 MICROGram(s) Oral daily  metoprolol succinate ER 12.5 milliGRAM(s) Oral daily  montelukast 10 milliGRAM(s) Oral at bedtime  sodium chloride 0.9% lock flush 3 milliLiter(s) IV Push every 8 hours  ticagrelor 90 milliGRAM(s) Oral two times a day      LABS:  01-22    136  |  100  |  52<H>  ----------------------------<  212<H>  4.0   |  20<L>  |  2.06<H>    Ca    9.8      22 Jan 2018 05:00  Phos  4.3     01-22  Mg     2.1     01-22      Creatinine Trend: 2.06 <--, 1.64 <--, 1.53 <--, 1.68 <--, 1.45 <--    Phosphorus Level, Serum: 4.3 mg/dL (01-22 @ 05:00)                              10.0   9.85  )-----------( 310      ( 22 Jan 2018 05:00 )             32.1     Urine Studies:      Iron 40, TIBC 377, %sat --      [01-20-18 @ 06:06]  Ferritin 38.35      [01-20-18 @ 06:06]  HbA1c 9.1      [11-25-17 @ 06:30]  TSH 0.55      [01-19-18 @ 06:45]  Lipid: chol 130, , HDL 30, LDL 70      [01-19-18 @ 06:45]        RADIOLOGY & ADDITIONAL STUDIES:

## 2018-01-22 NOTE — PROGRESS NOTE ADULT - ASSESSMENT
71 Female, with a PmHx of HTN, CKD, Hyperlipidemia, DM-II, CAD s/p CABG x 3, s/p stents, presenting to the [facility] ED c/o shortness of breath. Pt is being admitted to telemetry for acute on chronic systolic heart failure.    Problem/Plan - 1:  ·  Problem: CHF exacerbation.  Plan: telemonitor   strict I & O's, daily wts  Fluid restriction  diuresis as per cards  cards and pulm fu  check TTE  no need for cath as per cards    Problem/Plan - 2:  ·  Problem: Diabetes mellitus, type 2.  Plan: monitor fs   basal/bolus     Problem/Plan - 3:  ·  Problem: Hyperlipidemia.  Plan: cw statin    Problem/Plan - 4:  ·  Problem: Hypothyroidism.  Plan: cw levothyroxine.

## 2018-01-23 DIAGNOSIS — R69 ILLNESS, UNSPECIFIED: ICD-10-CM

## 2018-01-23 LAB
APPEARANCE UR: CLEAR — SIGNIFICANT CHANGE UP
BILIRUB UR-MCNC: NEGATIVE — SIGNIFICANT CHANGE UP
BLOOD UR QL VISUAL: NEGATIVE — SIGNIFICANT CHANGE UP
BUN SERPL-MCNC: 62 MG/DL — HIGH (ref 7–23)
CALCIUM SERPL-MCNC: 10.4 MG/DL — SIGNIFICANT CHANGE UP (ref 8.4–10.5)
CHLORIDE SERPL-SCNC: 100 MMOL/L — SIGNIFICANT CHANGE UP (ref 98–107)
CO2 SERPL-SCNC: 17 MMOL/L — LOW (ref 22–31)
COLOR SPEC: SIGNIFICANT CHANGE UP
CREAT ?TM UR-MCNC: 90.38 MG/DL — SIGNIFICANT CHANGE UP
CREAT SERPL-MCNC: 2.31 MG/DL — HIGH (ref 0.5–1.3)
EOSINOPHIL NFR URNS MANUAL: SIGNIFICANT CHANGE UP (ref 0–0)
GLUCOSE BLDC GLUCOMTR-MCNC: 238 MG/DL — HIGH (ref 70–99)
GLUCOSE BLDC GLUCOMTR-MCNC: 270 MG/DL — HIGH (ref 70–99)
GLUCOSE BLDC GLUCOMTR-MCNC: 287 MG/DL — HIGH (ref 70–99)
GLUCOSE BLDC GLUCOMTR-MCNC: 321 MG/DL — HIGH (ref 70–99)
GLUCOSE SERPL-MCNC: 251 MG/DL — HIGH (ref 70–99)
GLUCOSE UR-MCNC: 250 — HIGH
HCT VFR BLD CALC: 35.1 % — SIGNIFICANT CHANGE UP (ref 34.5–45)
HGB BLD-MCNC: 11 G/DL — LOW (ref 11.5–15.5)
KETONES UR-MCNC: NEGATIVE — SIGNIFICANT CHANGE UP
LEUKOCYTE ESTERASE UR-ACNC: SIGNIFICANT CHANGE UP
MAGNESIUM SERPL-MCNC: 2.1 MG/DL — SIGNIFICANT CHANGE UP (ref 1.6–2.6)
MCHC RBC-ENTMCNC: 26.8 PG — LOW (ref 27–34)
MCHC RBC-ENTMCNC: 31.3 % — LOW (ref 32–36)
MCV RBC AUTO: 85.4 FL — SIGNIFICANT CHANGE UP (ref 80–100)
NITRITE UR-MCNC: NEGATIVE — SIGNIFICANT CHANGE UP
NRBC # FLD: 0 — SIGNIFICANT CHANGE UP
PH UR: 5.5 — SIGNIFICANT CHANGE UP (ref 4.6–8)
PHOSPHATE SERPL-MCNC: 5.1 MG/DL — HIGH (ref 2.5–4.5)
PLATELET # BLD AUTO: 328 K/UL — SIGNIFICANT CHANGE UP (ref 150–400)
PMV BLD: 9.5 FL — SIGNIFICANT CHANGE UP (ref 7–13)
POTASSIUM SERPL-MCNC: 4.3 MMOL/L — SIGNIFICANT CHANGE UP (ref 3.5–5.3)
POTASSIUM SERPL-SCNC: 4.3 MMOL/L — SIGNIFICANT CHANGE UP (ref 3.5–5.3)
PROT UR-MCNC: 30 MG/DL — HIGH
RBC # BLD: 4.11 M/UL — SIGNIFICANT CHANGE UP (ref 3.8–5.2)
RBC # FLD: 14.5 % — SIGNIFICANT CHANGE UP (ref 10.3–14.5)
RBC CASTS # UR COMP ASSIST: SIGNIFICANT CHANGE UP (ref 0–?)
SODIUM SERPL-SCNC: 137 MMOL/L — SIGNIFICANT CHANGE UP (ref 135–145)
SP GR SPEC: 1.01 — SIGNIFICANT CHANGE UP (ref 1–1.04)
SQUAMOUS # UR AUTO: SIGNIFICANT CHANGE UP
TRANS CELLS #/AREA URNS HPF: <1 — SIGNIFICANT CHANGE UP
UROBILINOGEN FLD QL: NORMAL MG/DL — SIGNIFICANT CHANGE UP
UUN UR-MCNC: 765.1 MG/DL — SIGNIFICANT CHANGE UP
WBC # BLD: 11.09 K/UL — HIGH (ref 3.8–10.5)
WBC # FLD AUTO: 11.09 K/UL — HIGH (ref 3.8–10.5)
WBC UR QL: SIGNIFICANT CHANGE UP (ref 0–?)

## 2018-01-23 PROCEDURE — 93306 TTE W/DOPPLER COMPLETE: CPT | Mod: 26

## 2018-01-23 RX ORDER — INSULIN LISPRO 100/ML
18 VIAL (ML) SUBCUTANEOUS
Qty: 0 | Refills: 0 | Status: DISCONTINUED | OUTPATIENT
Start: 2018-01-23 | End: 2018-01-24

## 2018-01-23 RX ADMIN — TICAGRELOR 90 MILLIGRAM(S): 90 TABLET ORAL at 05:04

## 2018-01-23 RX ADMIN — SODIUM CHLORIDE 3 MILLILITER(S): 9 INJECTION INTRAMUSCULAR; INTRAVENOUS; SUBCUTANEOUS at 21:11

## 2018-01-23 RX ADMIN — Medication 6: at 08:58

## 2018-01-23 RX ADMIN — Medication 40 MILLIGRAM(S): at 05:04

## 2018-01-23 RX ADMIN — Medication 4: at 18:25

## 2018-01-23 RX ADMIN — Medication 81 MILLIGRAM(S): at 08:59

## 2018-01-23 RX ADMIN — Medication 18 UNIT(S): at 18:25

## 2018-01-23 RX ADMIN — MONTELUKAST 10 MILLIGRAM(S): 4 TABLET, CHEWABLE ORAL at 21:11

## 2018-01-23 RX ADMIN — Medication 16 UNIT(S): at 08:58

## 2018-01-23 RX ADMIN — ATORVASTATIN CALCIUM 40 MILLIGRAM(S): 80 TABLET, FILM COATED ORAL at 21:11

## 2018-01-23 RX ADMIN — HEPARIN SODIUM 5000 UNIT(S): 5000 INJECTION INTRAVENOUS; SUBCUTANEOUS at 17:08

## 2018-01-23 RX ADMIN — SODIUM CHLORIDE 3 MILLILITER(S): 9 INJECTION INTRAMUSCULAR; INTRAVENOUS; SUBCUTANEOUS at 05:07

## 2018-01-23 RX ADMIN — SODIUM CHLORIDE 3 MILLILITER(S): 9 INJECTION INTRAMUSCULAR; INTRAVENOUS; SUBCUTANEOUS at 13:39

## 2018-01-23 RX ADMIN — Medication 4: at 21:57

## 2018-01-23 RX ADMIN — Medication 6: at 13:39

## 2018-01-23 RX ADMIN — Medication 50 MICROGRAM(S): at 05:04

## 2018-01-23 RX ADMIN — TICAGRELOR 90 MILLIGRAM(S): 90 TABLET ORAL at 17:08

## 2018-01-23 RX ADMIN — INSULIN GLARGINE 56 UNIT(S): 100 INJECTION, SOLUTION SUBCUTANEOUS at 21:57

## 2018-01-23 RX ADMIN — Medication 16 UNIT(S): at 13:39

## 2018-01-23 RX ADMIN — Medication 12.5 MILLIGRAM(S): at 05:04

## 2018-01-23 RX ADMIN — FAMOTIDINE 20 MILLIGRAM(S): 10 INJECTION INTRAVENOUS at 08:59

## 2018-01-23 RX ADMIN — HEPARIN SODIUM 5000 UNIT(S): 5000 INJECTION INTRAVENOUS; SUBCUTANEOUS at 05:04

## 2018-01-23 NOTE — PROGRESS NOTE ADULT - SUBJECTIVE AND OBJECTIVE BOX
Chief complaint  Patient is a 72y old  Female who presents with a chief complaint of SOB (19 Jan 2018 09:48)   Review of systems  Patient in bed, looks comfortable, no fever, no hypoglycemia.    Labs and Fingersticks  CAPILLARY BLOOD GLUCOSE      POCT Blood Glucose.: 238 mg/dL (23 Jan 2018 17:33)  POCT Blood Glucose.: 270 mg/dL (23 Jan 2018 13:30)  POCT Blood Glucose.: 287 mg/dL (23 Jan 2018 08:51)  POCT Blood Glucose.: 180 mg/dL (22 Jan 2018 22:09)          Calcium, Total Serum: 10.4 (01-23 @ 05:20)  Calcium, Total Serum: 9.8 (01-22 @ 05:00)          01-23    137  |  100  |  62<H>  ----------------------------<  251<H>  4.3   |  17<L>  |  2.31<H>    Ca    10.4      23 Jan 2018 05:20  Phos  5.1     01-23  Mg     2.1     01-23                          11.0   11.09 )-----------( 328      ( 23 Jan 2018 05:20 )             35.1     Medications  MEDICATIONS  (STANDING):  aspirin enteric coated 81 milliGRAM(s) Oral daily  atorvastatin 40 milliGRAM(s) Oral at bedtime  dextrose 5%. 1000 milliLiter(s) (50 mL/Hr) IV Continuous <Continuous>  dextrose 50% Injectable 12.5 Gram(s) IV Push once  dextrose 50% Injectable 25 Gram(s) IV Push once  dextrose 50% Injectable 25 Gram(s) IV Push once  famotidine    Tablet 20 milliGRAM(s) Oral daily  heparin  Injectable 5000 Unit(s) SubCutaneous every 12 hours  insulin glargine Injectable (LANTUS) 56 Unit(s) SubCutaneous at bedtime  insulin lispro (HumaLOG) corrective regimen sliding scale   SubCutaneous three times a day before meals  insulin lispro (HumaLOG) corrective regimen sliding scale   SubCutaneous at bedtime  insulin lispro Injectable (HumaLOG) 18 Unit(s) SubCutaneous three times a day before meals  levothyroxine 50 MICROGram(s) Oral daily  metoprolol succinate ER 12.5 milliGRAM(s) Oral daily  montelukast 10 milliGRAM(s) Oral at bedtime  sodium chloride 0.9% lock flush 3 milliLiter(s) IV Push every 8 hours  ticagrelor 90 milliGRAM(s) Oral two times a day      Physical Exam  General: Patient comfortable in bed  Vital Signs Last 12 Hrs  T(F): 97.8 (01-23-18 @ 19:31), Max: 97.8 (01-23-18 @ 19:31)  HR: 89 (01-23-18 @ 19:31) (78 - 89)  BP: 122/62 (01-23-18 @ 19:31) (109/64 - 122/62)  BP(mean): --  RR: 16 (01-23-18 @ 19:31) (16 - 16)  SpO2: 100% (01-23-18 @ 19:31) (100% - 100%)  Neck: No palpable thyroid nodules.  CVS: S1S2, No murmurs  Respiratory: No wheezing, no crepitations  GI: Abdomen soft, bowel sounds positive  Musculoskeletal:  edema lower extremities.   Skin: No skin rashes, no ecchymosis    Diagnostics

## 2018-01-23 NOTE — PROGRESS NOTE ADULT - ASSESSMENT
1.	CHF decompensation,  on Lasix 40 daily.  2.	MERVIN: renal function worsening likely hyperglycemia on lasix  3.	CKD 3, baseline 1.5-1.8  4.	CAD. cardio following      PLAN:   1.	consider changing lasix to PO  2.	check urine cr, urea, eos, UA to r/o other etiology  3.	Follow up BMP.  4.	f/u cardio

## 2018-01-23 NOTE — PROGRESS NOTE ADULT - ASSESSMENT
71 Female, with a PmHx of HTN, CKD, Hyperlipidemia, DM-II, CAD s/p CABG x 3, s/p stents, presenting to the [facility] ED c/o shortness of breath. Pt is being admitted to telemetry for acute on chronic systolic heart failure.    Problem/Plan - 1:  ·  Problem: CHF exacerbation.  Plan: telemonitor   strict I & O's, daily wts  Fluid restriction  diuresis as per cardiology  check TTE  no need for cath as per cardiology    Problem/Plan - 2:  ·  Problem: Diabetes mellitus, type 2.  Plan: monitor fs   basal/bolus     Problem/Plan - 3:  ·  Problem: Hyperlipidemia.  Plan: cw statin    Problem/Plan - 4:  ·  Problem: Hypothyroidism.  Plan: cw levothyroxine.

## 2018-01-23 NOTE — PROGRESS NOTE ADULT - SUBJECTIVE AND OBJECTIVE BOX
covering for DR Lopez   71 y/o female presented with sob denies any chest pain or palpitations      T(C): 36.3 (23 Jan 2018 13:49), Max: 36.7 (22 Jan 2018 19:44)  T(F): 97.3 (23 Jan 2018 13:49), Max: 98 (22 Jan 2018 19:44)  HR: 78 (23 Jan 2018 13:49) (78 - 84)  BP: 109/64 (23 Jan 2018 13:49) (109/64 - 126/70)  BP(mean): --  ABP: --  ABP(mean): --  RR: 16 (23 Jan 2018 13:49) (16 - 16)  SpO2: 100% (23 Jan 2018 13:49) (99% - 100%)                        11.0   11.09 )-----------( 328      ( 23 Jan 2018 05:20 )             35.1   01-23    137  |  100  |  62<H>  ----------------------------<  251<H>  4.3   |  17<L>  |  2.31<H>    Ca    10.4      23 Jan 2018 05:20  Phos  5.1     01-23  Mg     2.1     01-23

## 2018-01-23 NOTE — PROGRESS NOTE ADULT - SUBJECTIVE AND OBJECTIVE BOX
Subjective:  No CP or SOB  	   MEDICATIONS  (STANDING):  aspirin enteric coated 81 milliGRAM(s) Oral daily  atorvastatin 40 milliGRAM(s) Oral at bedtime  famotidine    Tablet 20 milliGRAM(s) Oral daily  heparin  Injectable 5000 Unit(s) SubCutaneous every 12 hours  insulin glargine Injectable (LANTUS) 56 Unit(s) SubCutaneous at bedtime  insulin lispro (HumaLOG) corrective regimen sliding scale   SubCutaneous three times a day before meals  insulin lispro (HumaLOG) corrective regimen sliding scale   SubCutaneous at bedtime  insulin lispro Injectable (HumaLOG) 16 Unit(s) SubCutaneous three times a day before meals  levothyroxine 50 MICROGram(s) Oral daily  metoprolol succinate ER 12.5 milliGRAM(s) Oral daily  montelukast 10 milliGRAM(s) Oral at bedtime  sodium chloride 0.9% lock flush 3 milliLiter(s) IV Push every 8 hours  ticagrelor 90 milliGRAM(s) Oral two times a day    LABS:                        11.0   11.09 )-----------( 328      ( 23 Jan 2018 05:20 )             35.1     137  |  100  |  62<H>  ----------------------------<  251<H>  4.3   |  17<L>  |  2.31<H>    Ca    10.4      23 Jan 2018 05:20  Phos  5.1     01-23  Mg     2.1     01-23    Creatinine Trend: 2.31<--, 2.06<--, 1.64<--, 1.53<--, 1.68<--, 1.45<--     PHYSICAL EXAM  Vital Signs Last 24 Hrs  T(C): 36.4 (23 Jan 2018 05:03), Max: 36.7 (22 Jan 2018 19:44)  T(F): 97.6 (23 Jan 2018 05:03), Max: 98 (22 Jan 2018 19:44)  HR: 80 (23 Jan 2018 05:03) (80 - 89)  BP: 126/70 (23 Jan 2018 05:03) (117/60 - 126/70)  RR: 16 (23 Jan 2018 05:03) (16 - 18)  SpO2: 100% (23 Jan 2018 05:03) (99% - 100%)    Cardiovascular: S1S2 RRR  Respiratory: grossly CTA BL	  Gastrointestinal:  Soft, Non-tender, + BS	  Extremities No edema B/L LE's     DIAGNOSTIC DATA:     TELEMETRY: SR 	       < from: Cardiac Cath Lab - Adult (10.13.17 @ 10:40) >  VENTRICLES: No left ventriculogram was performed.  CORONARY VESSELS: The coronary circulation is right dominant.  LM:   --  LM: Normal.  LAD:   --  Proximal LAD: There was a diffuse 60 % stenosis.  --  Mid LAD: There was a 100 % stenosis with the distal vessel being filled  via LIMA-LAD.  CX:   --  Circumflex: The vessel was small sized. Angiography showed mild  atherosclerosis withno flow limiting lesions.  --  OM1: Angiography showed mild atherosclerosis with no flow limiting  lesions.  RI:   --  Ostial ramus intermedius: There was a tubular 80 % stenosis.  There was PARUL grade 3 flow through the vessel (brisk flow).  RCA:   --  Proximal RCA: Angiography showed mild atherosclerosis with no  flow limiting lesions.  --  Mid RCA: There was a 100 % stenosis with the distal vessel being filled  via collaterals. This lesion is a chronic total occlusion.  GRAFTS:   --  Graft to the LAD: The graft was a LIMA. Graft angiography  showed minor luminal irregularities. Distal vessel angiography showed  minor luminal irregularities.  AORTA: Ascending aorta: Normal. No additional grafts visualized in  aortogram.  COMPLICATIONS: There were no complications.  DIAGNOSTIC RECOMMENDATIONS: Coronary angiogram demonstrates patent  LIMA-LAD, closed SVG grafts, and a severe stenosis in the ostial Ramus.  Will perform staged PCI to RI when Cr stable and renally optimized.  Prepared and signed by  Corie Ga M.D.  Signed 10/17/2017 13:49:15    < end of copied text >    < from: Cardiac Cath Lab - Adult (11.17.17 @ 11:48) >  PROCEDURE:  --  Intervention on ramus intermedius: drug-eluting stent.    VENTRICLES: No LV gram was performed; however, a recent echocardiogram  demonstrated normal global and regional LV function.  CORONARY VESSELS: The coronary circulation is right dominant.  LM:   --  LM: Angiography showed minor luminal irregularities with no flow  limiting lesions.  LAD:   --  Ostial LAD: There was a 100 % stenosis.  CX:   --  Circumflex: This vessel was not injected, but was visualized  during a prior cardiac catheterization.  RI:   --  Ramus intermedius: There was a 95 % stenosis.  RCA:   --  RCA: This vessel was not injected.  COMPLICATIONS: There were no complications.  DIAGNOSTIC RECOMMENDATIONS: The patient should continue with the present  medications. will add plavix 75mg po once a day/ will add ECASA 325mg po  once day.  Prepared and signed by  Roberta Quick M.D.  Signed 11/17/2017 13:16:01    < end of copied text >    < from: TTE with Doppler (w/Cont) (11.25.17 @ 12:33) >  CONCLUSIONS:  1. Mitral annular calcification, otherwise normal mitral  valve. Mild mitral regurgitation.  2. Normal left ventricular internal dimensions and wall  thicknesses.  3. Endocardium not well visualized; grossly moderate to  severe segmental left ventricularsystolic dysfunction.  Hypokinesis of the inferior, lateral, and inferolateral  walls.  Endocardial visualization enhanced with intravenous  injection of echo contrast (Definity).  No LV thrombus  seen.  4. The right ventricle is not well visualized;grossly  normal right ventricular systolic function.  ------------------------------------------------------------------------  Confirmed on  11/25/2017 - 14:44:41 by Jose Lucia M.D.    < end of copied text >    < from: Cardiac Cath Lab - Adult (12.05.17 @ 12:48) >  VENTRICLES: No LV gram was performed; however, a recent echocardiogram  demonstrated an EF of 36 %.  CORONARY VESSELS: The coronary circulation is right dominant.  LM:   --  Distal left main: Angiography showed minor luminal irregularities  with no flow limiting lesions.  LAD:   --  Mid LAD: There was a 100 % stenosis with the distal vessel being  filled via LIMA-LAD.  CX:   --  Proximal circumflex: There was a tubular 20 % stenosis.  --  Mid circumflex: Angiography showed minor luminal irregularities with no  flow limiting lesions.  --  Distal circumflex: Angiography showed minor luminal irregularities with  no flow limiting lesions.  --  OM1: The vessel was small sized. There was a discrete 60 % stenosis.  RI:   --  Ostial ramus intermedius: Angiography showed minor luminal  irregularities with no flow limiting lesions. There was no significant  restenosis in RI stent.  --  Proximal ramus intermedius: Angiography showed minor luminal  irregularities with no flow limiting lesions.  --  Mid ramus intermedius: There was a tubular 80 % stenosis. The lesion  was associated with a small filling defect consistent with thrombus.  RCA:   --  Mid RCA: There was a 100 % stenosis with the distal vessel being  filled via collaterals from the LAD.  GRAFTS:   --  Graft to the LAD: The graft was a LIMA. Graft angiography  showed minor luminal irregularities. Distal vessel angiography showed  minor luminalirregularities.  COMPLICATIONS: There were no complications.  DIAGNOSTIC RECOMMENDATIONS: Coronary angiogram demonstrates a severe  stenosis in the ramus intermedius that is the cause of the patient's  clinical presentation. Will therefore perform PCI to the RI.  INTERVENTIONAL RECOMMENDATIONS: S/p successful CORNELIA to the Ramus  Intermedius. The patient should continue with ASA and Brilinta for at  least 12 months.  Prepared and signed by  Corie Ga M.D.  Signed 12/06/2017 17:06:30    < from: CT Chest No Cont (01.20.18 @ 09:55) >  IMPRESSION:   Mild pulmonary edema with small simple bilateral pleural effusions, right   greater than left, with no evidence of loculation.    < end of copied text >      ASSESSMENT/PLAN: 	72y Female w/ PMH HTN, CKD, Hyperlipidemia, DM-II, CAD s/p 3V CABG  (LIMA to LAD, SVG to OM, SVG to PDA) at [facility] 2014, s/p CORNELIA TO RI 11/17/17, NSTEMI 12/2017 s/p [facility] on 12/5 and CORNELIA to D Ramus, TTE at that time revealed grossly moderate to severe LV dysfunction with hypokinesis of the inferior, lateral and inferolateral with an EF of 36% admitted with acute on chronic systolic CHF     --creat trending up. ? Hold lasix as appears euvolemic by exam.  Will D/W Renal  --mildly elevated trops noted in setting of CKD & CHF exacerbation  - no need for urgent repeat cath at this time  --CT chest noted above  --Repeat TTE done today, f/u report  --Cont. DAPT for recent CORNELIA    Juliane Fitch PA-C  [facility]  [address], [place] [address] [place] [id number]  office [phone]  pager [phone]

## 2018-01-23 NOTE — PROGRESS NOTE ADULT - SUBJECTIVE AND OBJECTIVE BOX
Dr. Muhammad (Nephrology)  Office [phone]  Cell [phone]  [facility]  Cell [phone]      Patient is a 72y old  Female who presents with a chief complaint of SOB (19 Jan 2018 09:48)      Patient seen and examined at bedside. No chest pain/sob    VITALS:  T(F): 97.6 (01-23-18 @ 05:03), Max: 98 (01-22-18 @ 19:44)  HR: 80 (01-23-18 @ 05:03)  BP: 126/70 (01-23-18 @ 05:03)  RR: 16 (01-23-18 @ 05:03)  SpO2: 100% (01-23-18 @ 05:03)  Wt(kg): --    01-22 @ 07:01  -  01-23 @ 07:00  --------------------------------------------------------  IN: 670 mL / OUT: 0 mL / NET: 670 mL    01-23 @ 07:01  -  01-23 @ 12:01  --------------------------------------------------------  IN: 340 mL / OUT: 0 mL / NET: 340 mL          PHYSICAL EXAM:  Constitutional: NAD  Neck: No JVD  Respiratory: CTAB, no wheezes, rales or rhonchi  Cardiovascular: S1, S2, RRR  Gastrointestinal: BS+, soft, NT/ND  Extremities: No peripheral edema    Hospital Medications:   MEDICATIONS  (STANDING):  aspirin enteric coated 81 milliGRAM(s) Oral daily  atorvastatin 40 milliGRAM(s) Oral at bedtime  dextrose 5%. 1000 milliLiter(s) (50 mL/Hr) IV Continuous <Continuous>  dextrose 50% Injectable 12.5 Gram(s) IV Push once  dextrose 50% Injectable 25 Gram(s) IV Push once  dextrose 50% Injectable 25 Gram(s) IV Push once  famotidine    Tablet 20 milliGRAM(s) Oral daily  furosemide   Injectable 40 milliGRAM(s) IV Push daily  heparin  Injectable 5000 Unit(s) SubCutaneous every 12 hours  insulin glargine Injectable (LANTUS) 56 Unit(s) SubCutaneous at bedtime  insulin lispro (HumaLOG) corrective regimen sliding scale   SubCutaneous three times a day before meals  insulin lispro (HumaLOG) corrective regimen sliding scale   SubCutaneous at bedtime  insulin lispro Injectable (HumaLOG) 16 Unit(s) SubCutaneous three times a day before meals  levothyroxine 50 MICROGram(s) Oral daily  metoprolol succinate ER 12.5 milliGRAM(s) Oral daily  montelukast 10 milliGRAM(s) Oral at bedtime  sodium chloride 0.9% lock flush 3 milliLiter(s) IV Push every 8 hours  ticagrelor 90 milliGRAM(s) Oral two times a day      LABS:  01-23    137  |  100  |  62<H>  ----------------------------<  251<H>  4.3   |  17<L>  |  2.31<H>    Ca    10.4      23 Jan 2018 05:20  Phos  5.1     01-23  Mg     2.1     01-23      Creatinine Trend: 2.31 <--, 2.06 <--, 1.64 <--, 1.53 <--, 1.68 <--, 1.45 <--    Phosphorus Level, Serum: 5.1 mg/dL (01-23 @ 05:20)                              11.0   11.09 )-----------( 328      ( 23 Jan 2018 05:20 )             35.1     Urine Studies:      Iron 40, TIBC 377, %sat --      [01-20-18 @ 06:06]  Ferritin 38.35      [01-20-18 @ 06:06]  HbA1c 9.1      [11-25-17 @ 06:30]  TSH 0.55      [01-19-18 @ 06:45]  Lipid: chol 130, , HDL 30, LDL 70      [01-19-18 @ 06:45]        RADIOLOGY & ADDITIONAL STUDIES:

## 2018-01-24 LAB
BUN SERPL-MCNC: 66 MG/DL — HIGH (ref 7–23)
CALCIUM SERPL-MCNC: 9.8 MG/DL — SIGNIFICANT CHANGE UP (ref 8.4–10.5)
CHLORIDE SERPL-SCNC: 100 MMOL/L — SIGNIFICANT CHANGE UP (ref 98–107)
CK MB BLD-MCNC: 3.78 NG/ML — SIGNIFICANT CHANGE UP (ref 1–4.7)
CK MB BLD-MCNC: SIGNIFICANT CHANGE UP (ref 0–2.5)
CK SERPL-CCNC: 69 U/L — SIGNIFICANT CHANGE UP (ref 25–170)
CO2 SERPL-SCNC: 17 MMOL/L — LOW (ref 22–31)
CREAT SERPL-MCNC: 2.18 MG/DL — HIGH (ref 0.5–1.3)
GLUCOSE BLDC GLUCOMTR-MCNC: 192 MG/DL — HIGH (ref 70–99)
GLUCOSE BLDC GLUCOMTR-MCNC: 254 MG/DL — HIGH (ref 70–99)
GLUCOSE BLDC GLUCOMTR-MCNC: 278 MG/DL — HIGH (ref 70–99)
GLUCOSE BLDC GLUCOMTR-MCNC: 297 MG/DL — HIGH (ref 70–99)
GLUCOSE SERPL-MCNC: 215 MG/DL — HIGH (ref 70–99)
HCT VFR BLD CALC: 30.8 % — LOW (ref 34.5–45)
HGB BLD-MCNC: 10 G/DL — LOW (ref 11.5–15.5)
MAGNESIUM SERPL-MCNC: 2.2 MG/DL — SIGNIFICANT CHANGE UP (ref 1.6–2.6)
MCHC RBC-ENTMCNC: 27.7 PG — SIGNIFICANT CHANGE UP (ref 27–34)
MCHC RBC-ENTMCNC: 32.5 % — SIGNIFICANT CHANGE UP (ref 32–36)
MCV RBC AUTO: 85.3 FL — SIGNIFICANT CHANGE UP (ref 80–100)
NRBC # FLD: 0 — SIGNIFICANT CHANGE UP
PLATELET # BLD AUTO: 300 K/UL — SIGNIFICANT CHANGE UP (ref 150–400)
PMV BLD: 9.6 FL — SIGNIFICANT CHANGE UP (ref 7–13)
POTASSIUM SERPL-MCNC: 4 MMOL/L — SIGNIFICANT CHANGE UP (ref 3.5–5.3)
POTASSIUM SERPL-SCNC: 4 MMOL/L — SIGNIFICANT CHANGE UP (ref 3.5–5.3)
RBC # BLD: 3.61 M/UL — LOW (ref 3.8–5.2)
RBC # FLD: 14.3 % — SIGNIFICANT CHANGE UP (ref 10.3–14.5)
SODIUM SERPL-SCNC: 137 MMOL/L — SIGNIFICANT CHANGE UP (ref 135–145)
TROPONIN T SERPL-MCNC: < 0.06 NG/ML — SIGNIFICANT CHANGE UP (ref 0–0.06)
WBC # BLD: 9.99 K/UL — SIGNIFICANT CHANGE UP (ref 3.8–10.5)
WBC # FLD AUTO: 9.99 K/UL — SIGNIFICANT CHANGE UP (ref 3.8–10.5)

## 2018-01-24 PROCEDURE — 93010 ELECTROCARDIOGRAM REPORT: CPT

## 2018-01-24 RX ORDER — SODIUM BICARBONATE 1 MEQ/ML
650 SYRINGE (ML) INTRAVENOUS THREE TIMES A DAY
Qty: 0 | Refills: 0 | Status: DISCONTINUED | OUTPATIENT
Start: 2018-01-24 | End: 2018-02-09

## 2018-01-24 RX ORDER — INSULIN LISPRO 100/ML
20 VIAL (ML) SUBCUTANEOUS
Qty: 0 | Refills: 0 | Status: DISCONTINUED | OUTPATIENT
Start: 2018-01-24 | End: 2018-01-25

## 2018-01-24 RX ORDER — FUROSEMIDE 40 MG
40 TABLET ORAL DAILY
Qty: 0 | Refills: 0 | Status: DISCONTINUED | OUTPATIENT
Start: 2018-01-24 | End: 2018-01-26

## 2018-01-24 RX ORDER — INSULIN GLARGINE 100 [IU]/ML
60 INJECTION, SOLUTION SUBCUTANEOUS AT BEDTIME
Qty: 0 | Refills: 0 | Status: DISCONTINUED | OUTPATIENT
Start: 2018-01-24 | End: 2018-01-25

## 2018-01-24 RX ORDER — FUROSEMIDE 40 MG
40 TABLET ORAL ONCE
Qty: 0 | Refills: 0 | Status: COMPLETED | OUTPATIENT
Start: 2018-01-24 | End: 2018-01-24

## 2018-01-24 RX ADMIN — MONTELUKAST 10 MILLIGRAM(S): 4 TABLET, CHEWABLE ORAL at 22:41

## 2018-01-24 RX ADMIN — Medication 81 MILLIGRAM(S): at 09:18

## 2018-01-24 RX ADMIN — Medication 40 MILLIGRAM(S): at 11:25

## 2018-01-24 RX ADMIN — SODIUM CHLORIDE 3 MILLILITER(S): 9 INJECTION INTRAMUSCULAR; INTRAVENOUS; SUBCUTANEOUS at 21:31

## 2018-01-24 RX ADMIN — TICAGRELOR 90 MILLIGRAM(S): 90 TABLET ORAL at 18:12

## 2018-01-24 RX ADMIN — SODIUM CHLORIDE 3 MILLILITER(S): 9 INJECTION INTRAMUSCULAR; INTRAVENOUS; SUBCUTANEOUS at 05:06

## 2018-01-24 RX ADMIN — HEPARIN SODIUM 5000 UNIT(S): 5000 INJECTION INTRAVENOUS; SUBCUTANEOUS at 18:12

## 2018-01-24 RX ADMIN — Medication 20 UNIT(S): at 18:11

## 2018-01-24 RX ADMIN — Medication 20 UNIT(S): at 13:09

## 2018-01-24 RX ADMIN — FAMOTIDINE 20 MILLIGRAM(S): 10 INJECTION INTRAVENOUS at 09:18

## 2018-01-24 RX ADMIN — Medication 650 MILLIGRAM(S): at 13:09

## 2018-01-24 RX ADMIN — Medication 6: at 18:11

## 2018-01-24 RX ADMIN — INSULIN GLARGINE 60 UNIT(S): 100 INJECTION, SOLUTION SUBCUTANEOUS at 22:42

## 2018-01-24 RX ADMIN — SODIUM CHLORIDE 3 MILLILITER(S): 9 INJECTION INTRAMUSCULAR; INTRAVENOUS; SUBCUTANEOUS at 13:10

## 2018-01-24 RX ADMIN — Medication 18 UNIT(S): at 09:17

## 2018-01-24 RX ADMIN — Medication 12.5 MILLIGRAM(S): at 05:06

## 2018-01-24 RX ADMIN — TICAGRELOR 90 MILLIGRAM(S): 90 TABLET ORAL at 05:06

## 2018-01-24 RX ADMIN — ATORVASTATIN CALCIUM 40 MILLIGRAM(S): 80 TABLET, FILM COATED ORAL at 22:41

## 2018-01-24 RX ADMIN — Medication 2: at 13:08

## 2018-01-24 RX ADMIN — Medication 6: at 09:17

## 2018-01-24 RX ADMIN — Medication 2: at 22:41

## 2018-01-24 RX ADMIN — Medication 50 MICROGRAM(S): at 05:06

## 2018-01-24 RX ADMIN — Medication 650 MILLIGRAM(S): at 22:41

## 2018-01-24 RX ADMIN — HEPARIN SODIUM 5000 UNIT(S): 5000 INJECTION INTRAVENOUS; SUBCUTANEOUS at 05:06

## 2018-01-24 NOTE — PROGRESS NOTE ADULT - SUBJECTIVE AND OBJECTIVE BOX
covering for Anayeli  no complaints feels better  ICU Vital Signs Last 24 Hrs  T(C): 36.2 (24 Jan 2018 11:24), Max: 36.6 (23 Jan 2018 19:31)  T(F): 97.2 (24 Jan 2018 11:24), Max: 97.9 (24 Jan 2018 05:05)  HR: 81 (24 Jan 2018 11:24) (81 - 89)  BP: 119/74 (24 Jan 2018 11:24) (110/62 - 122/62)  BP(mean): --  ABP: --  ABP(mean): --  RR: 16 (24 Jan 2018 11:24) (16 - 16)  SpO2: 100% (24 Jan 2018 11:24) (100% - 100%)                        10.0   9.99  )-----------( 300      ( 24 Jan 2018 05:10 )             30.8   01-24    137  |  100  |  66<H>  ----------------------------<  215<H>  4.0   |  17<L>  |  2.18<H>    Ca    9.8      24 Jan 2018 05:10  Phos  5.1     01-23  Mg     2.2     01-24  MEDICATIONS  (STANDING):  aspirin enteric coated 81 milliGRAM(s) Oral daily  atorvastatin 40 milliGRAM(s) Oral at bedtime  dextrose 5%. 1000 milliLiter(s) (50 mL/Hr) IV Continuous <Continuous>  dextrose 50% Injectable 12.5 Gram(s) IV Push once  dextrose 50% Injectable 25 Gram(s) IV Push once  dextrose 50% Injectable 25 Gram(s) IV Push once  famotidine    Tablet 20 milliGRAM(s) Oral daily  furosemide    Tablet 40 milliGRAM(s) Oral daily  heparin  Injectable 5000 Unit(s) SubCutaneous every 12 hours  insulin glargine Injectable (LANTUS) 60 Unit(s) SubCutaneous at bedtime  insulin lispro (HumaLOG) corrective regimen sliding scale   SubCutaneous three times a day before meals  insulin lispro (HumaLOG) corrective regimen sliding scale   SubCutaneous at bedtime  insulin lispro Injectable (HumaLOG) 20 Unit(s) SubCutaneous three times a day before meals  levothyroxine 50 MICROGram(s) Oral daily  metoprolol succinate ER 12.5 milliGRAM(s) Oral daily  montelukast 10 milliGRAM(s) Oral at bedtime  sodium bicarbonate 650 milliGRAM(s) Oral three times a day  sodium chloride 0.9% lock flush 3 milliLiter(s) IV Push every 8 hours  ticagrelor 90 milliGRAM(s) Oral two times a day

## 2018-01-24 NOTE — PROGRESS NOTE ADULT - ASSESSMENT
71 Female, with a PmHx of HTN, CKD, Hyperlipidemia, DM-II, CAD s/p CABG x 3, s/p stents, presenting to the [facility] ED c/o shortness of breath. Pt is being admitted to telemetry for acute on chronic systolic heart failure.    Problem/Plan - 1:  ·  Problem: CHF exacerbation.  Plan: telemonitor   strict I & O's, daily wts  Fluid restriction  diuresis as per cardiology  check TTE  no need for cath as per cardiology    Problem/Plan - 2:  ·  Problem: Diabetes mellitus, type 2.  Plan: monitor fs   basal/bolus     Problem/Plan - 3:  ·  Problem: Hyperlipidemia.  Plan: cw statin    Problem/Plan - 4:  ·  Problem: Hypothyroidism.  Plan: cw levothyroxine.

## 2018-01-24 NOTE — PROGRESS NOTE ADULT - SUBJECTIVE AND OBJECTIVE BOX
Dr. Muhammad (Nephrology)  Office [phone]  Cell [phone]  [facility] FI[facility]  Cell [phone]      Patient is a 72y old  Female who presents with a chief complaint of SOB (19 Jan 2018 09:48)      Patient seen and examined at bedside. No chest pain/sob    VITALS:  T(F): 97.9 (01-24-18 @ 05:05), Max: 97.9 (01-24-18 @ 05:05)  HR: 86 (01-24-18 @ 05:05)  BP: 110/62 (01-24-18 @ 05:05)  RR: 16 (01-24-18 @ 05:05)  SpO2: 100% (01-24-18 @ 05:05)  Wt(kg): --    01-23 @ 07:01  -  01-24 @ 07:00  --------------------------------------------------------  IN: 780 mL / OUT: 0 mL / NET: 780 mL    01-24 @ 07:01  -  01-24 @ 11:01  --------------------------------------------------------  IN: 200 mL / OUT: 0 mL / NET: 200 mL          PHYSICAL EXAM:  Constitutional: NAD  Neck: No JVD  Respiratory: CTAB, no wheezes, rales or rhonchi  Cardiovascular: S1, S2, RRR  Gastrointestinal: BS+, soft, NT/ND  Extremities: No peripheral edema    Hospital Medications:   MEDICATIONS  (STANDING):  aspirin enteric coated 81 milliGRAM(s) Oral daily  atorvastatin 40 milliGRAM(s) Oral at bedtime  dextrose 5%. 1000 milliLiter(s) (50 mL/Hr) IV Continuous <Continuous>  dextrose 50% Injectable 12.5 Gram(s) IV Push once  dextrose 50% Injectable 25 Gram(s) IV Push once  dextrose 50% Injectable 25 Gram(s) IV Push once  famotidine    Tablet 20 milliGRAM(s) Oral daily  heparin  Injectable 5000 Unit(s) SubCutaneous every 12 hours  insulin glargine Injectable (LANTUS) 60 Unit(s) SubCutaneous at bedtime  insulin lispro (HumaLOG) corrective regimen sliding scale   SubCutaneous three times a day before meals  insulin lispro (HumaLOG) corrective regimen sliding scale   SubCutaneous at bedtime  insulin lispro Injectable (HumaLOG) 20 Unit(s) SubCutaneous three times a day before meals  levothyroxine 50 MICROGram(s) Oral daily  metoprolol succinate ER 12.5 milliGRAM(s) Oral daily  montelukast 10 milliGRAM(s) Oral at bedtime  sodium chloride 0.9% lock flush 3 milliLiter(s) IV Push every 8 hours  ticagrelor 90 milliGRAM(s) Oral two times a day      LABS:  01-24    137  |  100  |  66<H>  ----------------------------<  215<H>  4.0   |  17<L>  |  2.18<H>    Ca    9.8      24 Jan 2018 05:10  Phos  5.1     01-23  Mg     2.2     01-24      Creatinine Trend: 2.18 <--, 2.31 <--, 2.06 <--, 1.64 <--, 1.53 <--, 1.68 <--, 1.45 <--                                10.0   9.99  )-----------( 300      ( 24 Jan 2018 05:10 )             30.8     Urine Studies:  Urinalysis - [01-23-18 @ 20:09]      Color PLYEL / Appearance CLEAR / SG 1.015 / pH 5.5      Gluc 250 / Ketone NEGATIVE  / Bili NEGATIVE / Urobili NORMAL       Blood NEGATIVE / Protein 30 / Leuk Est TRACE / Nitrite NEGATIVE      RBC 0-2 / WBC 2-5 / Hyaline  / Gran  / Sq Epi OCC / Non Sq Epi  / Bacteria     Urine Creatinine 90.38      [01-23-18 @ 20:09]  Urine Urea Nitrogen 765.1      [01-23-18 @ 20:09]    Iron 40, TIBC 377, %sat --      [01-20-18 @ 06:06]  Ferritin 38.35      [01-20-18 @ 06:06]  HbA1c 9.1      [11-25-17 @ 06:30]  TSH 0.55      [01-19-18 @ 06:45]  Lipid: chol 130, , HDL 30, LDL 70      [01-19-18 @ 06:45]        RADIOLOGY & ADDITIONAL STUDIES:

## 2018-01-24 NOTE — PROGRESS NOTE ADULT - SUBJECTIVE AND OBJECTIVE BOX
Subjective:  +SOB with chest pressure this AM while washing up in bathroom  	     MEDICATIONS  (STANDING):  aspirin enteric coated 81 milliGRAM(s) Oral daily  atorvastatin 40 milliGRAM(s) Oral at bedtime  dextrose 5%. 1000 milliLiter(s) (50 mL/Hr) IV Continuous <Continuous>  dextrose 50% Injectable 12.5 Gram(s) IV Push once  dextrose 50% Injectable 25 Gram(s) IV Push once  dextrose 50% Injectable 25 Gram(s) IV Push once  famotidine    Tablet 20 milliGRAM(s) Oral daily  furosemide    Tablet 40 milliGRAM(s) Oral daily  heparin  Injectable 5000 Unit(s) SubCutaneous every 12 hours  insulin glargine Injectable (LANTUS) 60 Unit(s) SubCutaneous at bedtime  insulin lispro (HumaLOG) corrective regimen sliding scale   SubCutaneous three times a day before meals  insulin lispro (HumaLOG) corrective regimen sliding scale   SubCutaneous at bedtime  insulin lispro Injectable (HumaLOG) 20 Unit(s) SubCutaneous three times a day before meals  levothyroxine 50 MICROGram(s) Oral daily  metoprolol succinate ER 12.5 milliGRAM(s) Oral daily  montelukast 10 milliGRAM(s) Oral at bedtime  sodium bicarbonate 650 milliGRAM(s) Oral three times a day  sodium chloride 0.9% lock flush 3 milliLiter(s) IV Push every 8 hours  ticagrelor 90 milliGRAM(s) Oral two times a day    LABS:                        10.0   9.99  )-----------( 300      ( 24 Jan 2018 05:10 )             30.8    137  |  100  |  66<H>  ----------------------------<  215<H>  4.0   |  17<L>  |  2.18<H>    Ca    9.8      24 Jan 2018 05:10  Phos  5.1     01-23  Mg     2.2     01-24    Creatinine Trend: 2.18<--, 2.31<--, 2.06<--, 1.64<--, 1.53<--, 1.68<--     CARDIAC MARKERS ( 24 Jan 2018 05:10 )  x     / < 0.06 ng/mL / 69 u/L / 3.78 ng/mL / x        PHYSICAL EXAM  Vital Signs Last 24 Hrs  T(C): 36.2 (24 Jan 2018 11:24), Max: 36.6 (23 Jan 2018 19:31)  T(F): 97.2 (24 Jan 2018 11:24), Max: 97.9 (24 Jan 2018 05:05)  HR: 81 (24 Jan 2018 11:24) (78 - 89)  BP: 119/74 (24 Jan 2018 11:24) (109/64 - 122/62)  RR: 16 (24 Jan 2018 11:24) (16 - 16)  SpO2: 100% (24 Jan 2018 11:24) (100% - 100%)    Cardiovascular: S1S2 RRR  Respiratory: Bi-basilar crackles	  Gastrointestinal:  Soft, Non-tender, + BS	  Extremities No edema B/L LE's     DIAGNOSTIC DATA:     TELEMETRY: SR 	       < from: Cardiac Cath Lab - Adult (10.13.17 @ 10:40) >  VENTRICLES: No left ventriculogram was performed.  CORONARY VESSELS: The coronary circulation is right dominant.  LM:   --  LM: Normal.  LAD:   --  Proximal LAD: There was a diffuse 60 % stenosis.  --  Mid LAD: There was a 100 % stenosis with the distal vessel being filled  via LIMA-LAD.  CX:   --  Circumflex: The vessel was small sized. Angiography showed mild  atherosclerosis withno flow limiting lesions.  --  OM1: Angiography showed mild atherosclerosis with no flow limiting  lesions.  RI:   --  Ostial ramus intermedius: There was a tubular 80 % stenosis.  There was PARUL grade 3 flow through the vessel (brisk flow).  RCA:   --  Proximal RCA: Angiography showed mild atherosclerosis with no  flow limiting lesions.  --  Mid RCA: There was a 100 % stenosis with the distal vessel being filled  via collaterals. This lesion is a chronic total occlusion.  GRAFTS:   --  Graft to the LAD: The graft was a LIMA. Graft angiography  showed minor luminal irregularities. Distal vessel angiography showed  minor luminal irregularities.  AORTA: Ascending aorta: Normal. No additional grafts visualized in  aortogram.  COMPLICATIONS: There were no complications.  DIAGNOSTIC RECOMMENDATIONS: Coronary angiogram demonstrates patent  LIMA-LAD, closed SVG grafts, and a severe stenosis in the ostial Ramus.  Will perform staged PCI to RI when Cr stable and renally optimized.  Prepared and signed by  Corie Ga M.D.  Signed 10/17/2017 13:49:15    < end of copied text >    < from: Cardiac Cath Lab - Adult (11.17.17 @ 11:48) >  PROCEDURE:  --  Intervention on ramus intermedius: drug-eluting stent.    VENTRICLES: No LV gram was performed; however, a recent echocardiogram  demonstrated normal global and regional LV function.  CORONARY VESSELS: The coronary circulation is right dominant.  LM:   --  LM: Angiography showed minor luminal irregularities with no flow  limiting lesions.  LAD:   --  Ostial LAD: There was a 100 % stenosis.  CX:   --  Circumflex: This vessel was not injected, but was visualized  during a prior cardiac catheterization.  RI:   --  Ramus intermedius: There was a 95 % stenosis.  RCA:   --  RCA: This vessel was not injected.  COMPLICATIONS: There were no complications.  DIAGNOSTIC RECOMMENDATIONS: The patient should continue with the present  medications. will add plavix 75mg po once a day/ will add ECASA 325mg po  once day.  Prepared and signed by  Roberta Quick M.D.  Signed 11/17/2017 13:16:01    < end of copied text >    < from: TTE with Doppler (w/Cont) (11.25.17 @ 12:33) >  CONCLUSIONS:  1. Mitral annular calcification, otherwise normal mitral  valve. Mild mitral regurgitation.  2. Normal left ventricular internal dimensions and wall  thicknesses.  3. Endocardium not well visualized; grossly moderate to  severe segmental left ventricularsystolic dysfunction.  Hypokinesis of the inferior, lateral, and inferolateral  walls.  Endocardial visualization enhanced with intravenous  injection of echo contrast (Definity).  No LV thrombus  seen.  4. The right ventricle is not well visualized;grossly  normal right ventricular systolic function.  ------------------------------------------------------------------------  Confirmed on  11/25/2017 - 14:44:41 by Jose Lucia M.D.    < end of copied text >    < from: Cardiac Cath Lab - Adult (12.05.17 @ 12:48) >  VENTRICLES: No LV gram was performed; however, a recent echocardiogram  demonstrated an EF of 36 %.  CORONARY VESSELS: The coronary circulation is right dominant.  LM:   --  Distal left main: Angiography showed minor luminal irregularities  with no flow limiting lesions.  LAD:   --  Mid LAD: There was a 100 % stenosis with the distal vessel being  filled via LIMA-LAD.  CX:   --  Proximal circumflex: There was a tubular 20 % stenosis.  --  Mid circumflex: Angiography showed minor luminal irregularities with no  flow limiting lesions.  --  Distal circumflex: Angiography showed minor luminal irregularities with  no flow limiting lesions.  --  OM1: The vessel was small sized. There was a discrete 60 % stenosis.  RI:   --  Ostial ramus intermedius: Angiography showed minor luminal  irregularities with no flow limiting lesions. There was no significant  restenosis in RI stent.  --  Proximal ramus intermedius: Angiography showed minor luminal  irregularities with no flow limiting lesions.  --  Mid ramus intermedius: There was a tubular 80 % stenosis. The lesion  was associated with a small filling defect consistent with thrombus.  RCA:   --  Mid RCA: There was a 100 % stenosis with the distal vessel being  filled via collaterals from the LAD.  GRAFTS:   --  Graft to the LAD: The graft was a LIMA. Graft angiography  showed minor luminal irregularities. Distal vessel angiography showed  minor luminalirregularities.  COMPLICATIONS: There were no complications.  DIAGNOSTIC RECOMMENDATIONS: Coronary angiogram demonstrates a severe  stenosis in the ramus intermedius that is the cause of the patient's  clinical presentation. Will therefore perform PCI to the RI.  INTERVENTIONAL RECOMMENDATIONS: S/p successful CORNELIA to the Ramus  Intermedius. The patient should continue with ASA and Brilinta for at  least 12 months.  Prepared and signed by  Corie Ga M.D.  Signed 12/06/2017 17:06:30    < from: CT Chest No Cont (01.20.18 @ 09:55) >  IMPRESSION:   Mild pulmonary edema with small simple bilateral pleural effusions, right   greater than left, with no evidence of loculation.    < end of copied text >    < from: TTE with Doppler (w/Cont) (01.23.18 @ 12:31) >  CONCLUSIONS:  1. Mitral annular calcification, otherwise normal mitral  valve. Mild-moderate mitral regurgitation.  2. Calcified trileaflet aortic valve with normal opening.  Mild aortic regurgitation.  3. Normal left ventricular internal dimensions and wall  thicknesses.  4. Endocardium not well visualized; grossly severe global  left ventricular systolic dysfunction.  Endocardial  visualization enhanced with intravenous injection of echo  contrast (Definity).  5. The right ventricle is not well visualized; grossly  normal right ventricular systolic function.  *** Compared with echocardiogram of 11/25/2017, no  significant changes noted.  ------------------------------------------------------------------------  Confirmed on  1/23/2018 - 14:35:49 by Jose Lucia M.D.    < end of copied text >      ASSESSMENT/PLAN: 	72y Female w/ PMH HTN, CKD, Hyperlipidemia, DM-II, CAD s/p 3V CABG  (LIMA to LAD, SVG to OM, SVG to PDA) at [facility] 2014, s/p CORNELIA TO RI 11/17/17, NSTEMI 12/2017 s/p C on 12/5 and CORNELIA to D Ramus, TTE at that time revealed grossly moderate to severe LV dysfunction with hypokinesis of the inferior, lateral and inferolateral with an EF of 36% admitted with acute on chronic systolic CHF     --Events noted.  D/W Renal.  Lasix 40 IV x 1 now.  Keep O>I  --mildly elevated trops noted in setting of CKD & CHF exacerbation  - no need for urgent repeat cath at this time  --CT chest noted above  --Repeat TTE unchanged from prior.  EF 32%  --Cont. DAPT for recent CORNELIA    Juliane Fitch PA-C  [facility]  [address], [address]   [place] [address]  office [phone]  pager [phone]

## 2018-01-24 NOTE — PROGRESS NOTE ADULT - SUBJECTIVE AND OBJECTIVE BOX
Chief complaint  Patient is a 72y old  Female who presents with a chief complaint of SOB (19 Jan 2018 09:48)   Review of systems  Patient in bed, looks comfortable, no fever, no hypoglycemia.    Labs and Fingersticks  CAPILLARY BLOOD GLUCOSE      POCT Blood Glucose.: 192 mg/dL (24 Jan 2018 13:00)  POCT Blood Glucose.: 254 mg/dL (24 Jan 2018 09:12)  POCT Blood Glucose.: 321 mg/dL (23 Jan 2018 21:37)  POCT Blood Glucose.: 238 mg/dL (23 Jan 2018 17:33)          Calcium, Total Serum: 9.8 (01-24 @ 05:10)  Calcium, Total Serum: 10.4 (01-23 @ 05:20)          01-24    137  |  100  |  66<H>  ----------------------------<  215<H>  4.0   |  17<L>  |  2.18<H>    Ca    9.8      24 Jan 2018 05:10  Phos  5.1     01-23  Mg     2.2     01-24                          10.0   9.99  )-----------( 300      ( 24 Jan 2018 05:10 )             30.8     Medications  MEDICATIONS  (STANDING):  aspirin enteric coated 81 milliGRAM(s) Oral daily  atorvastatin 40 milliGRAM(s) Oral at bedtime  dextrose 5%. 1000 milliLiter(s) (50 mL/Hr) IV Continuous <Continuous>  dextrose 50% Injectable 12.5 Gram(s) IV Push once  dextrose 50% Injectable 25 Gram(s) IV Push once  dextrose 50% Injectable 25 Gram(s) IV Push once  famotidine    Tablet 20 milliGRAM(s) Oral daily  furosemide    Tablet 40 milliGRAM(s) Oral daily  heparin  Injectable 5000 Unit(s) SubCutaneous every 12 hours  insulin glargine Injectable (LANTUS) 60 Unit(s) SubCutaneous at bedtime  insulin lispro (HumaLOG) corrective regimen sliding scale   SubCutaneous three times a day before meals  insulin lispro (HumaLOG) corrective regimen sliding scale   SubCutaneous at bedtime  insulin lispro Injectable (HumaLOG) 20 Unit(s) SubCutaneous three times a day before meals  levothyroxine 50 MICROGram(s) Oral daily  metoprolol succinate ER 12.5 milliGRAM(s) Oral daily  montelukast 10 milliGRAM(s) Oral at bedtime  sodium bicarbonate 650 milliGRAM(s) Oral three times a day  sodium chloride 0.9% lock flush 3 milliLiter(s) IV Push every 8 hours  ticagrelor 90 milliGRAM(s) Oral two times a day      Physical Exam  General: Patient comfortable in bed  Vital Signs Last 12 Hrs  T(F): 97.2 (01-24-18 @ 11:24), Max: 97.9 (01-24-18 @ 05:05)  HR: 81 (01-24-18 @ 11:24) (81 - 86)  BP: 119/74 (01-24-18 @ 11:24) (110/62 - 119/74)  BP(mean): --  RR: 16 (01-24-18 @ 11:24) (16 - 16)  SpO2: 100% (01-24-18 @ 11:24) (100% - 100%)  Neck: No palpable thyroid nodules.  CVS: S1S2, No murmurs  Respiratory: No wheezing, no crepitations  GI: Abdomen soft, bowel sounds positive  Musculoskeletal:  edema lower extremities.   Skin: No skin rashes, no ecchymosis    Diagnostics

## 2018-01-24 NOTE — PROGRESS NOTE ADULT - ASSESSMENT
1.	CHF decompensation, EF 32%  2.	MERVIN: renal function worsening likely hyperglycemia on lasix, FEurea<35%,  improved today  3.	CKD 3, baseline 1.5-1.8  4.	CAD. cardio following      PLAN:   1.	In view of improving Scr, poor EF and patient present with SOB would resume lasix 40mg PO today,   2.	Monitor BMP.  3.	f/u cardio 1.	CHF decompensation, EF 32%  2.	MERVIN: renal function worsening likely hyperglycemia on lasix, FEurea<35%,  improved today  3.	CKD 3, baseline 1.5-1.8  4.	CAD. cardio following  5.	Acidosis    PLAN:   1.	In view of improving Scr, poor EF and patient present with SOB would resume lasix 40mg PO today,   2.	Monitor BMP.  3.	start sodium bicarb 650mg tid  4.	f/u cardio

## 2018-01-25 ENCOUNTER — TRANSCRIPTION ENCOUNTER (OUTPATIENT)
Age: 73
End: 2018-01-25

## 2018-01-25 LAB
BUN SERPL-MCNC: 56 MG/DL — HIGH (ref 7–23)
CALCIUM SERPL-MCNC: 9.2 MG/DL — SIGNIFICANT CHANGE UP (ref 8.4–10.5)
CHLORIDE SERPL-SCNC: 99 MMOL/L — SIGNIFICANT CHANGE UP (ref 98–107)
CO2 SERPL-SCNC: 22 MMOL/L — SIGNIFICANT CHANGE UP (ref 22–31)
CREAT SERPL-MCNC: 1.75 MG/DL — HIGH (ref 0.5–1.3)
GLUCOSE BLDC GLUCOMTR-MCNC: 161 MG/DL — HIGH (ref 70–99)
GLUCOSE BLDC GLUCOMTR-MCNC: 186 MG/DL — HIGH (ref 70–99)
GLUCOSE BLDC GLUCOMTR-MCNC: 203 MG/DL — HIGH (ref 70–99)
GLUCOSE BLDC GLUCOMTR-MCNC: 229 MG/DL — HIGH (ref 70–99)
GLUCOSE SERPL-MCNC: 223 MG/DL — HIGH (ref 70–99)
HCT VFR BLD CALC: 32.1 % — LOW (ref 34.5–45)
HGB BLD-MCNC: 10.1 G/DL — LOW (ref 11.5–15.5)
MAGNESIUM SERPL-MCNC: 2.1 MG/DL — SIGNIFICANT CHANGE UP (ref 1.6–2.6)
MCHC RBC-ENTMCNC: 26 PG — LOW (ref 27–34)
MCHC RBC-ENTMCNC: 31.5 % — LOW (ref 32–36)
MCV RBC AUTO: 82.7 FL — SIGNIFICANT CHANGE UP (ref 80–100)
NRBC # FLD: 0 — SIGNIFICANT CHANGE UP
PLATELET # BLD AUTO: 312 K/UL — SIGNIFICANT CHANGE UP (ref 150–400)
PMV BLD: 9.6 FL — SIGNIFICANT CHANGE UP (ref 7–13)
POTASSIUM SERPL-MCNC: 3.2 MMOL/L — LOW (ref 3.5–5.3)
POTASSIUM SERPL-SCNC: 3.2 MMOL/L — LOW (ref 3.5–5.3)
RBC # BLD: 3.88 M/UL — SIGNIFICANT CHANGE UP (ref 3.8–5.2)
RBC # FLD: 14.4 % — SIGNIFICANT CHANGE UP (ref 10.3–14.5)
SODIUM SERPL-SCNC: 138 MMOL/L — SIGNIFICANT CHANGE UP (ref 135–145)
WBC # BLD: 8.89 K/UL — SIGNIFICANT CHANGE UP (ref 3.8–10.5)
WBC # FLD AUTO: 8.89 K/UL — SIGNIFICANT CHANGE UP (ref 3.8–10.5)

## 2018-01-25 RX ORDER — INSULIN GLARGINE 100 [IU]/ML
68 INJECTION, SOLUTION SUBCUTANEOUS AT BEDTIME
Qty: 0 | Refills: 0 | Status: DISCONTINUED | OUTPATIENT
Start: 2018-01-25 | End: 2018-02-09

## 2018-01-25 RX ORDER — INSULIN LISPRO 100/ML
24 VIAL (ML) SUBCUTANEOUS
Qty: 0 | Refills: 0 | Status: DISCONTINUED | OUTPATIENT
Start: 2018-01-25 | End: 2018-02-09

## 2018-01-25 RX ORDER — POTASSIUM CHLORIDE 20 MEQ
40 PACKET (EA) ORAL ONCE
Qty: 0 | Refills: 0 | Status: COMPLETED | OUTPATIENT
Start: 2018-01-25 | End: 2018-01-25

## 2018-01-25 RX ADMIN — Medication 40 MILLIEQUIVALENT(S): at 09:55

## 2018-01-25 RX ADMIN — MONTELUKAST 10 MILLIGRAM(S): 4 TABLET, CHEWABLE ORAL at 21:08

## 2018-01-25 RX ADMIN — Medication 24 UNIT(S): at 18:07

## 2018-01-25 RX ADMIN — Medication 20 UNIT(S): at 09:30

## 2018-01-25 RX ADMIN — Medication 40 MILLIGRAM(S): at 05:26

## 2018-01-25 RX ADMIN — Medication 50 MICROGRAM(S): at 05:26

## 2018-01-25 RX ADMIN — Medication 650 MILLIGRAM(S): at 21:08

## 2018-01-25 RX ADMIN — FAMOTIDINE 20 MILLIGRAM(S): 10 INJECTION INTRAVENOUS at 14:06

## 2018-01-25 RX ADMIN — Medication 4: at 14:05

## 2018-01-25 RX ADMIN — Medication 2: at 18:06

## 2018-01-25 RX ADMIN — TICAGRELOR 90 MILLIGRAM(S): 90 TABLET ORAL at 05:26

## 2018-01-25 RX ADMIN — SODIUM CHLORIDE 3 MILLILITER(S): 9 INJECTION INTRAMUSCULAR; INTRAVENOUS; SUBCUTANEOUS at 05:05

## 2018-01-25 RX ADMIN — ATORVASTATIN CALCIUM 40 MILLIGRAM(S): 80 TABLET, FILM COATED ORAL at 21:08

## 2018-01-25 RX ADMIN — Medication 4: at 09:30

## 2018-01-25 RX ADMIN — SODIUM CHLORIDE 3 MILLILITER(S): 9 INJECTION INTRAMUSCULAR; INTRAVENOUS; SUBCUTANEOUS at 21:08

## 2018-01-25 RX ADMIN — Medication 20 UNIT(S): at 14:06

## 2018-01-25 RX ADMIN — Medication 650 MILLIGRAM(S): at 14:06

## 2018-01-25 RX ADMIN — Medication 81 MILLIGRAM(S): at 14:06

## 2018-01-25 RX ADMIN — INSULIN GLARGINE 68 UNIT(S): 100 INJECTION, SOLUTION SUBCUTANEOUS at 22:47

## 2018-01-25 RX ADMIN — Medication 650 MILLIGRAM(S): at 05:27

## 2018-01-25 RX ADMIN — HEPARIN SODIUM 5000 UNIT(S): 5000 INJECTION INTRAVENOUS; SUBCUTANEOUS at 05:26

## 2018-01-25 RX ADMIN — SODIUM CHLORIDE 3 MILLILITER(S): 9 INJECTION INTRAMUSCULAR; INTRAVENOUS; SUBCUTANEOUS at 14:08

## 2018-01-25 RX ADMIN — Medication 12.5 MILLIGRAM(S): at 05:26

## 2018-01-25 RX ADMIN — TICAGRELOR 90 MILLIGRAM(S): 90 TABLET ORAL at 18:07

## 2018-01-25 NOTE — PROGRESS NOTE ADULT - ASSESSMENT
1.	CHF decompensation, EF 32%  2.	MERVIN: renal function worsening likely hyperglycemia on lasix, FEurea<35%,  improved today  3.	CKD 3, baseline 1.5-1.8  4.	CAD. cardio following  5.	Acidosis  6.	Hypokalemia    7.	  PLAN:   1.	diuresing per cardio  2.	Monitor BMP.  3.	Kcl 40meq x 1  4.	f/u cardio

## 2018-01-25 NOTE — PROGRESS NOTE ADULT - SUBJECTIVE AND OBJECTIVE BOX
Subjective:  Still with SOB  	     MEDICATIONS  (STANDING):  aspirin enteric coated 81 milliGRAM(s) Oral daily  atorvastatin 40 milliGRAM(s) Oral at bedtime  famotidine    Tablet 20 milliGRAM(s) Oral daily  furosemide    Tablet 40 milliGRAM(s) Oral daily  heparin  Injectable 5000 Unit(s) SubCutaneous every 12 hours  insulin glargine Injectable (LANTUS) 60 Unit(s) SubCutaneous at bedtime  insulin lispro (HumaLOG) corrective regimen sliding scale   SubCutaneous three times a day before meals  insulin lispro (HumaLOG) corrective regimen sliding scale   SubCutaneous at bedtime  insulin lispro Injectable (HumaLOG) 20 Unit(s) SubCutaneous three times a day before meals  levothyroxine 50 MICROGram(s) Oral daily  metoprolol succinate ER 12.5 milliGRAM(s) Oral daily  montelukast 10 milliGRAM(s) Oral at bedtime  sodium bicarbonate 650 milliGRAM(s) Oral three times a day  sodium chloride 0.9% lock flush 3 milliLiter(s) IV Push every 8 hours  ticagrelor 90 milliGRAM(s) Oral two times a day    LABS:                        10.1   8.89  )-----------( 312      ( 25 Jan 2018 06:30 )             32.1     138  |  99  |  56<H>  ----------------------------<  223<H>  3.2<L>   |  22  |  1.75<H>    Ca    9.2      25 Jan 2018 06:30  Mg     2.1     01-25    Creatinine Trend: 1.75<--, 2.18<--, 2.31<--, 2.06<--, 1.64<--, 1.53<--     CARDIAC MARKERS ( 24 Jan 2018 05:10 )  x     / < 0.06 ng/mL / 69 u/L / 3.78 ng/mL / x        PHYSICAL EXAM  Vital Signs Last 24 Hrs  T(C): 36.6 (25 Jan 2018 05:24), Max: 36.6 (25 Jan 2018 05:24)  T(F): 97.8 (25 Jan 2018 05:24), Max: 97.8 (25 Jan 2018 05:24)  HR: 82 (25 Jan 2018 05:24) (81 - 88)  BP: 121/71 (25 Jan 2018 05:24) (119/74 - 124/64)  RR: 17 (25 Jan 2018 05:24) (16 - 17)  SpO2: 100% (25 Jan 2018 05:24) (100% - 100%)    Cardiovascular: S1S2 RRR  Respiratory: Bi-basilar crackles	  Gastrointestinal:  Soft, Non-tender, + BS	  Extremities No edema B/L LE's     DIAGNOSTIC DATA:     TELEMETRY: SR 	       < from: Cardiac Cath Lab - Adult (10.13.17 @ 10:40) >  VENTRICLES: No left ventriculogram was performed.  CORONARY VESSELS: The coronary circulation is right dominant.  LM:   --  LM: Normal.  LAD:   --  Proximal LAD: There was a diffuse 60 % stenosis.  --  Mid LAD: There was a 100 % stenosis with the distal vessel being filled  via LIMA-LAD.  CX:   --  Circumflex: The vessel was small sized. Angiography showed mild  atherosclerosis withno flow limiting lesions.  --  OM1: Angiography showed mild atherosclerosis with no flow limiting  lesions.  RI:   --  Ostial ramus intermedius: There was a tubular 80 % stenosis.  There was PARUL grade 3 flow through the vessel (brisk flow).  RCA:   --  Proximal RCA: Angiography showed mild atherosclerosis with no  flow limiting lesions.  --  Mid RCA: There was a 100 % stenosis with the distal vessel being filled  via collaterals. This lesion is a chronic total occlusion.  GRAFTS:   --  Graft to the LAD: The graft was a LIMA. Graft angiography  showed minor luminal irregularities. Distal vessel angiography showed  minor luminal irregularities.  AORTA: Ascending aorta: Normal. No additional grafts visualized in  aortogram.  COMPLICATIONS: There were no complications.  DIAGNOSTIC RECOMMENDATIONS: Coronary angiogram demonstrates patent  LIMA-LAD, closed SVG grafts, and a severe stenosis in the ostial Ramus.  Will perform staged PCI to RI when Cr stable and renally optimized.  Prepared and signed by  Corie Ga M.D.  Signed 10/17/2017 13:49:15    < end of copied text >    < from: Cardiac Cath Lab - Adult (11.17.17 @ 11:48) >  PROCEDURE:  --  Intervention on ramus intermedius: drug-eluting stent.    VENTRICLES: No LV gram was performed; however, a recent echocardiogram  demonstrated normal global and regional LV function.  CORONARY VESSELS: The coronary circulation is right dominant.  LM:   --  LM: Angiography showed minor luminal irregularities with no flow  limiting lesions.  LAD:   --  Ostial LAD: There was a 100 % stenosis.  CX:   --  Circumflex: This vessel was not injected, but was visualized  during a prior cardiac catheterization.  RI:   --  Ramus intermedius: There was a 95 % stenosis.  RCA:   --  RCA: This vessel was not injected.  COMPLICATIONS: There were no complications.  DIAGNOSTIC RECOMMENDATIONS: The patient should continue with the present  medications. will add plavix 75mg po once a day/ will add ECASA 325mg po  once day.  Prepared and signed by  Roberta Quick M.D.  Signed 11/17/2017 13:16:01    < end of copied text >    < from: TTE with Doppler (w/Cont) (11.25.17 @ 12:33) >  CONCLUSIONS:  1. Mitral annular calcification, otherwise normal mitral  valve. Mild mitral regurgitation.  2. Normal left ventricular internal dimensions and wall  thicknesses.  3. Endocardium not well visualized; grossly moderate to  severe segmental left ventricularsystolic dysfunction.  Hypokinesis of the inferior, lateral, and inferolateral  walls.  Endocardial visualization enhanced with intravenous  injection of echo contrast (Definity).  No LV thrombus  seen.  4. The right ventricle is not well visualized;grossly  normal right ventricular systolic function.  ------------------------------------------------------------------------  Confirmed on  11/25/2017 - 14:44:41 by Jose Lucia M.D.    < end of copied text >    < from: Cardiac Cath Lab - Adult (12.05.17 @ 12:48) >  VENTRICLES: No LV gram was performed; however, a recent echocardiogram  demonstrated an EF of 36 %.  CORONARY VESSELS: The coronary circulation is right dominant.  LM:   --  Distal left main: Angiography showed minor luminal irregularities  with no flow limiting lesions.  LAD:   --  Mid LAD: There was a 100 % stenosis with the distal vessel being  filled via LIMA-LAD.  CX:   --  Proximal circumflex: There was a tubular 20 % stenosis.  --  Mid circumflex: Angiography showed minor luminal irregularities with no  flow limiting lesions.  --  Distal circumflex: Angiography showed minor luminal irregularities with  no flow limiting lesions.  --  OM1: The vessel was small sized. There was a discrete 60 % stenosis.  RI:   --  Ostial ramus intermedius: Angiography showed minor luminal  irregularities with no flow limiting lesions. There was no significant  restenosis in RI stent.  --  Proximal ramus intermedius: Angiography showed minor luminal  irregularities with no flow limiting lesions.  --  Mid ramus intermedius: There was a tubular 80 % stenosis. The lesion  was associated with a small filling defect consistent with thrombus.  RCA:   --  Mid RCA: There was a 100 % stenosis with the distal vessel being  filled via collaterals from the LAD.  GRAFTS:   --  Graft to the LAD: The graft was a LIMA. Graft angiography  showed minor luminal irregularities. Distal vessel angiography showed  minor luminalirregularities.  COMPLICATIONS: There were no complications.  DIAGNOSTIC RECOMMENDATIONS: Coronary angiogram demonstrates a severe  stenosis in the ramus intermedius that is the cause of the patient's  clinical presentation. Will therefore perform PCI to the RI.  INTERVENTIONAL RECOMMENDATIONS: S/p successful CORNELIA to the Ramus  Intermedius. The patient should continue with ASA and Brilinta for at  least 12 months.  Prepared and signed by  Corie Ga M.D.  Signed 12/06/2017 17:06:30    < from: CT Chest No Cont (01.20.18 @ 09:55) >  IMPRESSION:   Mild pulmonary edema with small simple bilateral pleural effusions, right   greater than left, with no evidence of loculation.    < end of copied text >    < from: TTE with Doppler (w/Cont) (01.23.18 @ 12:31) >  CONCLUSIONS:  1. Mitral annular calcification, otherwise normal mitral  valve. Mild-moderate mitral regurgitation.  2. Calcified trileaflet aortic valve with normal opening.  Mild aortic regurgitation.  3. Normal left ventricular internal dimensions and wall  thicknesses.  4. Endocardium not well visualized; grossly severe global  left ventricular systolic dysfunction.  Endocardial  visualization enhanced with intravenous injection of echo  contrast (Definity).  5. The right ventricle is not well visualized; grossly  normal right ventricular systolic function.  *** Compared with echocardiogram of 11/25/2017, no  significant changes noted.  ------------------------------------------------------------------------  Confirmed on  1/23/2018 - 14:35:49 by Jose Lucia M.D.    < end of copied text >      ASSESSMENT/PLAN: 	72y Female w/ PMH HTN, CKD, Hyperlipidemia, DM-II, CAD s/p 3V CABG  (LIMA to LAD, SVG to OM, SVG to PDA) at [facility] 2014, s/p CORNELIA TO RI 11/17/17, NSTEMI 12/2017 s/p [facility] on 12/5 and CORNELIA to D [place], TTE at that time revealed grossly moderate to severe LV dysfunction with hypokinesis of the inferior, lateral and inferolateral with an EF of 36% admitted with acute on chronic systolic CHF     --Cont Lasix.  Keep O>I  --mildly elevated trops noted in setting of CKD & CHF exacerbation  - no need for urgent repeat cath at this time  --CT chest noted above  --Repeat TTE unchanged from prior.  EF 32%  --Cont. DAPT for recent CORNELIA    Juliane Fitch PA-C  [facility]  [address], [address]   [place] [address]  office [phone]  pager [phone]

## 2018-01-25 NOTE — DISCHARGE NOTE ADULT - PATIENT PORTAL LINK FT
“You can access the [employer] Patient Portal, offered by [facility], by registering with the following website: [email or web address]”

## 2018-01-25 NOTE — DISCHARGE NOTE ADULT - PROVIDER TOKENS
TOKEN:'[id number]',FREE:[LAST:[Jason],FIRST:[Zeke],PHONE:[phone]],FAX:[(   [phone]    -],ADDRESS:[PRIMARY CARE PROVIDER]]

## 2018-01-25 NOTE — DISCHARGE NOTE ADULT - CARE PROVIDER_API CALL
Roberta Quick), Cardiovascular Disease; Interventional Cardiology  [address]  [place] [address]  Phone: [phone]  Fax: [phone]    Zeke Gomez  PRIMARY CARE PROVIDER  Phone: [phone]  Fax: (   )    -

## 2018-01-25 NOTE — DISCHARGE NOTE ADULT - CARE PLAN
Principal Discharge DX:	Chronic systolic CHF (congestive heart failure)  Goal:	To maintain euvolemic state.  Assessment and plan of treatment:	Continue medications as prescribed including lasix.  Low salt, fluid restricted (less than 1.5 L/day) diet.  Monitor your daily morning weight.  Follow up with cardiology Dr. Quick within 1-2 weeks; call for appointment.  Secondary Diagnosis:	Diabetes mellitus, type 2  Assessment and plan of treatment:	Continue diet modification. Avoid complex carbohydrates such as bread, pasta, cereal, white rice, white potatoes, etc. Avoid concentrated sugar as found in desserts, candy, soda, juice, etc. Consume a diet based on lean protein (chicken, fish) and vegetables.   Continue medications as prescribed.  Monitor fingersticks pre-meals and pre-bedtime.  Follow up with your PCP.  You should have yearly routine eye and foot examinations.  Check your HgA1C blood test every 3 months.  Secondary Diagnosis:	Stage 3 chronic kidney disease  Assessment and plan of treatment:	Follow up with your PCP for ongoing monitoring of your kidney function and electrolytes.  Continue medications as prescribed.  Secondary Diagnosis:	Hypothyroidism  Assessment and plan of treatment:	Continue synthroid as prescribed.  Follow up with your PCP.  Secondary Diagnosis:	Hyperlipidemia  Assessment and plan of treatment:	Continue lipitor as prescribed.  Low fat diet.  Follow up with your PCP.  Secondary Diagnosis:	CAD (coronary artery disease)  Assessment and plan of treatment:	Continue medications as prescribed.  Low salt, low fat, low sugar, fluid restricted diet.  Follow up with cardiology Dr. Quick within 1-2 weeks; call for appointment.  Secondary Diagnosis:	GERD (gastroesophageal reflux disease)  Assessment and plan of treatment:	Continue famotidine as prescribed.  Follow up with your PCP. Principal Discharge DX:	Acute on chronic systolic (congestive) heart failure  Goal:	To maintain euvolemic state.  Assessment and plan of treatment:	Continue medications as prescribed including Lasix.  Low salt, fluid restricted (less than 1.5 L/day) diet.  Monitor your daily morning weight.  Follow up with PCP in 1 week and cardiology Dr. Quick within 1-2 weeks; call for an appointment.  Secondary Diagnosis:	Diabetes mellitus, type 2  Goal:	good glycemic control  Assessment and plan of treatment:	Continue diet modification. Avoid complex carbohydrates such as bread, pasta, cereal, white rice, white potatoes, etc. Avoid concentrated sugar as found in desserts, candy, soda, juice, etc. Consume a diet based on lean protein (chicken, fish) and vegetables.   Continue medications as prescribed.  Monitor fingersticks pre-meals and pre-bedtime.  Follow up with your PCP.  You should have yearly routine eye and foot examinations.  Check your HgA1C blood test every 3 months.  Secondary Diagnosis:	Stage 3 chronic kidney disease  Assessment and plan of treatment:	Follow up with your PCP in 1 week for ongoing monitoring of your kidney function and electrolytes.  Continue medications as prescribed.  Secondary Diagnosis:	Hypothyroidism  Assessment and plan of treatment:	Continue synthroid as prescribed.  Follow up with your PCP in 1 week .  Secondary Diagnosis:	Hyperlipidemia  Assessment and plan of treatment:	Continue lipitor as prescribed.  Low fat diet.  Follow up with your PCP.  Secondary Diagnosis:	CAD (coronary artery disease)  Assessment and plan of treatment:	Continue medications as prescribed.  Low salt, low fat, low sugar, fluid restricted diet.  Follow up with PCP in 1 week and cardiology Dr. Quick within 1-2 weeks; call for an appointment.  Secondary Diagnosis:	GERD (gastroesophageal reflux disease)  Assessment and plan of treatment:	Continue famotidine as prescribed.  Follow up with your PCP.

## 2018-01-25 NOTE — PROGRESS NOTE ADULT - ASSESSMENT
Assessment  DMT2: 72y Female with DM T2 with hyperglycemia on insulin, blood sugars still high, no hypoglycemia,  eating meals,  non compliant with low carb diet.  Hypothyroidism: On synthroid 50 mcg po daily,  HTN: Controlled, On med.  CHF: On medications, monitored.

## 2018-01-25 NOTE — DISCHARGE NOTE ADULT - INSTRUCTIONS
Continue diet modification. Avoid complex carbohydrates such as bread, pasta, cereal, white rice, white potatoes, etc. Avoid concentrated sugar as found in desserts, candy, soda, juice, etc. Consume a diet based on lean protein (chicken, fish) and vegetables. Low salt, low fat, fluid restricted diet (less than 1.5 L/day).

## 2018-01-25 NOTE — DISCHARGE NOTE ADULT - MEDICATION SUMMARY - MEDICATIONS TO CHANGE
I will SWITCH the dose or number of times a day I take the medications listed below when I get home from the hospital:    Toujeo SoloStar 300 units/mL subcutaneous solution  -- 65 unit(s) subcutaneous once a day (at bedtime)

## 2018-01-25 NOTE — DISCHARGE NOTE ADULT - ADDITIONAL INSTRUCTIONS
Follow up with cardiology Dr. Quick within 1-2 weeks; call for appointment.  Follow up with your PCP within1 -2 weeks; call for appointment. Follow up with cardiology Dr. Quick within 1-2 weeks; call for appointment.  Follow up with your PCP within1 weeks; call for an appointment.

## 2018-01-25 NOTE — PROGRESS NOTE ADULT - SUBJECTIVE AND OBJECTIVE BOX
c/o sob no chest pain    T(C): 36.6 (25 Jan 2018 05:24), Max: 36.6 (25 Jan 2018 05:24)  T(F): 97.8 (25 Jan 2018 05:24), Max: 97.8 (25 Jan 2018 05:24)  HR: 82 (25 Jan 2018 05:24) (82 - 88)  BP: 121/71 (25 Jan 2018 05:24) (121/71 - 124/64)  BP(mean): --  ABP: --  ABP(mean): --  RR: 17 (25 Jan 2018 05:24) (17 - 17)  SpO2: 100% (25 Jan 2018 05:24) (100% - 100%)                        10.1   8.89  )-----------( 312      ( 25 Jan 2018 06:30 )             32.1   01-25    138  |  99  |  56<H>  ----------------------------<  223<H>  3.2<L>   |  22  |  1.75<H>    Ca    9.2      25 Jan 2018 06:30  Mg     2.1     01-25    CARDIAC MARKERS ( 24 Jan 2018 05:10 )  x     / < 0.06 ng/mL / 69 u/L / 3.78 ng/mL / x

## 2018-01-25 NOTE — DISCHARGE NOTE ADULT - SECONDARY DIAGNOSIS.
Diabetes mellitus, type 2 Stage 3 chronic kidney disease Hypothyroidism Hyperlipidemia CAD (coronary artery disease) GERD (gastroesophageal reflux disease)

## 2018-01-25 NOTE — DISCHARGE NOTE ADULT - HOSPITAL COURSE
HPI:  71 Female, with a PmHx of HTN, CKD, Hyperlipidemia, DM-II, CAD s/p CABG x 3, s/p stents, presenting to the [facility] ED c/o shortness of breath. Pt states for the past 2 weeks she has been feeling sob but for the past 2 days it was getting worse and this morning had gotten even worse so she had gone to see her doctor and was then referred to the hospital for an evaluation.  She denies any coughing, fever, chills, HA, dizziness, blurred vision, n/v, sick contacts, recent travel. Pt sates she was also c/o a burning sensation to the center of her chest that was non-radiating. Pt was recently admitted to [facility] in 12/2017 for a similar event that was requiring bipap and was in the CCU.  In the ED today, she had a cxr done that showed a trace bilateral pleural effusions and vascular pulmonary congestion. RVP is neg. Pt appear to be in mild distress at this time but is saturating 100% on nasal cannula. Pt was re-admitted to telemetry for acute on chronic systolic heart failure. (19 Jan 2018 09:48)    On admission:  EKG: NSR @ 96, T inv I, AVL, PVC's  CE x 2 (Trop: 0.26-->0.28)                 ProBNP: 1,707                     RVP neg  1/18 CXR - Trace bilateral pleural effusions with adjacent atelectasis. Pulmonary vascular congestion.  1/20- CT Chest- IMPRESSION:  Mild pulmonary edema with small simple bilateral pleural effusions, right greater than left, with no evidence of loculation.  1/23 Echo: EF 32% 1. Mitral annular calcification, otherwise normal mitral valve. Mild-moderate mitral regurgitation. 2. Calcified trileaflet aortic valve with normal opening. Mild aortic regurgitation. 3. Normal left ventricular internal dimensions and wall thicknesses. 4. Endocardium not well visualized; grossly severe global left ventricular systolic dysfunction.  Endocardial visualization enhanced with intravenous injection of echo contrast (Definity). 5. The right ventricle is not well visualized; grossly normal right ventricular systolic function.  *** Compared with echocardiogram of 11/25/2017, no significant changes noted.    Patient evaluated by endocrine Dr. Banks:  Diabetes mellitus, type 2: Will increase Lantus to 68 units at bed time. Will increase Humalog to 24 units before each meal in addition to Humalog correction scale coverage. Patient counseled for compliance with consistent low carb diet. Continue synthroid for hypothyroidism.     Patient evaluated by cardiology: 72y Female w/ PMH HTN, CKD, Hyperlipidemia, DM-II, CAD s/p 3V CABG  (LIMA to LAD, SVG to OM, SVG to PDA) at [facility] 2014, s/p CORNELIA TO RI 11/17/17, NSTEMI 12/2017 s/p [facility] on 12/5 and CORNELIA to D Ramus, TTE at that time revealed grossly moderate to severe LV dysfunction with hypokinesis of the inferior, lateral and inferolateral with an EF of 36% admitted with acute on chronic systolic CHF.  Patient was diuresed with IV lasix for CHF exacerbation.  Transitioned to po lasix 40mg qd 1/25.  Currently euvolemic. Mildly elevated trops noted in setting of CKD & CHF exacerbation  - no need for urgent repeat cath at this time. CT chest noted above. Repeat TTE unchanged from prior.  EF 32%. Cont. DAPT for recent CORNELIA.    Renal (Dr. Garcia) evaluated the patient for MERVIN on CKD 3. MERVIN: renal function worsening likely hyperglycemia on lasix, FEurea<35%, improved. CKD 3, baseline 1.5-1.8. Currently at basline.    PT evaluated the patient: no skilled PT needs.    INCOMPLETE  Patient to follow up with PCP and cardiology (Dr. Quick) as outpatient. HPI:  71 Female, with a PmHx of HTN, CKD, Hyperlipidemia, DM-II, CAD s/p CABG x 3, s/p stents, presenting to the [facility] ED c/o shortness of breath. Pt states for the past 2 weeks she has been feeling sob but for the past 2 days it was getting worse and this morning had gotten even worse so she had gone to see her doctor and was then referred to the hospital for an evaluation.  Pt was recently admitted to [facility] in 12/2017 for a similar event that was requiring bipap and was in the CCU.  In the ED today, she had a cxr done that showed a trace bilateral pleural effusions and vascular pulmonary congestion. RVP is neg. Pt appear to be in mild distress at this time but is saturating 100% on nasal cannula. Pt was re-admitted to telemetry for acute on chronic systolic heart failure. (19 Jan 2018 09:48)    On admission:  EKG: NSR @ 96, T inv I, AVL, PVC's  CE x 2 (Trop: 0.26-->0.28)                 ProBNP: 1,707                     RVP neg  1/18 CXR - Trace bilateral pleural effusions with adjacent atelectasis. Pulmonary vascular congestion.  1/20- CT Chest- IMPRESSION:  Mild pulmonary edema with small simple bilateral pleural effusions, right greater than left, with no evidence of loculation.       Problem/Plan - 1:    CHF, acute on chronic systolic:     1/23 Echo: EF 32% 1. Mitral annular calcification, otherwise normal mitral valve. Mild-moderate mitral regurgitation. 2. Calcified trileaflet aortic valve with normal opening. Mild aortic regurgitation. 3. Normal left ventricular internal dimensions and wall thicknesses. 4. Endocardium not well visualized; grossly severe global left ventricular systolic dysfunction.  Endocardial visualization enhanced with intravenous injection of echo contrast (Definity). 5. The right ventricle is not well visualized; grossly normal right ventricular systolic function.  *** Compared with echocardiogram of 11/25/2017, no significant changes noted.    1/30 NST:   * Myocardial Perfusion SPECT results are abnormal.  * There is a medium sized, mild to moderate defect in anterior wall that is reversible, suggestive of ischemia.There are large, moderate to severe defects in inferolateral, lateral walls that are fixed, suggestive of infarct.  * Post-stress gated wall motion analysis was performed (LVEF = 33 %;LVEDV = 116 ml.), revealing severe overall hypokinesis. There was focal inferiolateral akinesis and severe lateral hypokinesis with absence of systolic thickening. The best motion was in the septum.  *** Compared with Nuclear/Stress test of 11/5/2014, the observed defect are now more extensive, suggesting progression of disease. Prior LVEF was 39% with EDV 77 mL.    1/31-- repeat CT/chest-- Resolution of the previously noted bilateral pleural effusions and ground-glass opacities within both lungs when compared to previous exam.    2/6/18 Cardiac cath - Ramus stent  - patent, % , GOMES LAD - patent. RFA sheath pulled.    >> Patient to follow up with PCP and cardiology (Dr. Quick) as outpatient.        Problem/Plan - 2:  Uncontrolled DM,   ·  Problem: Diabetes mellitus, type 2. monitoring FS, log, Follow up-- 2 Weeks.  DC home on current insulin regimen,   Patient counseled for compliance with consistent low carb diet.     Patient evaluated by endocrine Dr. Banks:  Diabetes mellitus, type 2: Lantus was increased to 68 units at bed time. Humalog was increased to 24 units before each meal in addition to Humalog correction scale coverage. Patient counseled for compliance with consistent low carb diet. Continue synthroid for hypothyroidism.      Problem/Plan - 3:  MERVIN on CKD:  Renal (Dr. Garcia) evaluated the patient for MERVIN on CKD 3. MERVIN: renal function worsening likely hyperglycemia on lasix, FEurea<35%, improved. CKD 3, baseline 1.5-1.8. --  Currently at baseline.     Problem 4 /Plan -: hypothyroidism- cont present meds      PT evaluated the patient: no skilled PT needs.

## 2018-01-25 NOTE — PROGRESS NOTE ADULT - SUBJECTIVE AND OBJECTIVE BOX
Chief complaint  Patient is a 72y old  Female who presents with a chief complaint of SOB (19 Jan 2018 09:48)   Review of systems  Patient in bed, looks comfortable, no fever,  had no hypoglycemia.    Labs and Fingersticks  CAPILLARY BLOOD GLUCOSE      POCT Blood Glucose.: 203 mg/dL (25 Jan 2018 08:51)  POCT Blood Glucose.: 297 mg/dL (24 Jan 2018 22:22)  POCT Blood Glucose.: 278 mg/dL (24 Jan 2018 17:55)          Calcium, Total Serum: 9.2 (01-25 @ 06:30)  Calcium, Total Serum: 9.8 (01-24 @ 05:10)          01-25    138  |  99  |  56<H>  ----------------------------<  223<H>  3.2<L>   |  22  |  1.75<H>    Ca    9.2      25 Jan 2018 06:30  Mg     2.1     01-25                          10.1   8.89  )-----------( 312      ( 25 Jan 2018 06:30 )             32.1     Medications  MEDICATIONS  (STANDING):  aspirin enteric coated 81 milliGRAM(s) Oral daily  atorvastatin 40 milliGRAM(s) Oral at bedtime  dextrose 5%. 1000 milliLiter(s) (50 mL/Hr) IV Continuous <Continuous>  dextrose 50% Injectable 12.5 Gram(s) IV Push once  dextrose 50% Injectable 25 Gram(s) IV Push once  dextrose 50% Injectable 25 Gram(s) IV Push once  famotidine    Tablet 20 milliGRAM(s) Oral daily  furosemide    Tablet 40 milliGRAM(s) Oral daily  heparin  Injectable 5000 Unit(s) SubCutaneous every 12 hours  insulin glargine Injectable (LANTUS) 60 Unit(s) SubCutaneous at bedtime  insulin lispro (HumaLOG) corrective regimen sliding scale   SubCutaneous three times a day before meals  insulin lispro (HumaLOG) corrective regimen sliding scale   SubCutaneous at bedtime  insulin lispro Injectable (HumaLOG) 20 Unit(s) SubCutaneous three times a day before meals  levothyroxine 50 MICROGram(s) Oral daily  metoprolol succinate ER 12.5 milliGRAM(s) Oral daily  montelukast 10 milliGRAM(s) Oral at bedtime  sodium bicarbonate 650 milliGRAM(s) Oral three times a day  sodium chloride 0.9% lock flush 3 milliLiter(s) IV Push every 8 hours  ticagrelor 90 milliGRAM(s) Oral two times a day      Physical Exam  General: Patient comfortable in bed  Vital Signs Last 12 Hrs  T(F): 97.8 (01-25-18 @ 05:24), Max: 97.8 (01-25-18 @ 05:24)  HR: 82 (01-25-18 @ 05:24) (82 - 82)  BP: 121/71 (01-25-18 @ 05:24) (121/71 - 121/71)  BP(mean): --  RR: 17 (01-25-18 @ 05:24) (17 - 17)  SpO2: 100% (01-25-18 @ 05:24) (100% - 100%)  Neck: No palpable thyroid nodules.  CVS: S1S2, No murmurs  Respiratory: No wheezing, no crepitations  GI: Abdomen soft, bowel sounds positive  Musculoskeletal:  edema lower extremities.   Skin: No skin rashes, no ecchymosis    Diagnostics

## 2018-01-25 NOTE — PROGRESS NOTE ADULT - ASSESSMENT
71 Female, with a PmHx of HTN, CKD, Hyperlipidemia, DM-II, CAD s/p CABG x 3, s/p stents, presenting to the [facility] ED c/o shortness of breath. Pt is being admitted to telemetry for acute on chronic systolic heart failure.    Problem/Plan - 1:  ·  Problem: CHF exacerbation.  Plan: telemonitor   strict I & O's, daily wts  Fluid restriction  diuresis as per cardiology  Repeat TTE unchanged EF32%  no need for cath as per cardiology    Problem/Plan - 2:  ·  Problem: Diabetes mellitus, type 2.  Plan: monitor fs   basal/bolus     Problem/Plan - 3:  ·  Problem: Hyperlipidemia.  Plan: cw statin    Problem/Plan - 4:  ·  Problem: Hypothyroidism.  Plan: cw levothyroxine.

## 2018-01-25 NOTE — DISCHARGE NOTE ADULT - MEDICATION SUMMARY - MEDICATIONS TO TAKE
I will START or STAY ON the medications listed below when I get home from the hospital:    aspirin 81 mg oral delayed release tablet  -- 1 tab(s) by mouth once a day  -- Indication: For CAD (coronary artery disease)    acetaminophen 325 mg oral tablet  -- 2 tab(s) by mouth every 6 hours, As needed, Mild, moderate and severe pain  -- Indication: For pain control as needed     sodium bicarbonate 650 mg oral tablet  -- 1 tab(s) by mouth 3 times a day  -- Indication: For Supplement    Toujeo SoloStar 300 units/mL subcutaneous solution  -- 68 unit(s) subcutaneous once a day (at bedtime), 1 month supply   -- Indication: For Diabetes mellitus, type 2    Toujeo SoloStar 300 units/mL subcutaneous solution  -- 20 unit(s) subcutaneous once a day, in the morning, 10 am   -- Indication: For Diabetes mellitus, type 2    Apidra 100 units/mL subcutaneous solution  -- 24 microcurie subcutaneous 3 times a day (before meals)  -- Indication: For Diabetes mellitus, type 2    atorvastatin 40 mg oral tablet  -- 1 tab(s) by mouth once a day  -- Indication: For Hyperlipidemia, CAD    ticagrelor 90 mg oral tablet  -- 1 tab(s) by mouth 2 times a day    [facility]  [facility]  Discharge: 12/11/17 at 5pm   Pager: [phone]  -- Indication: For CAD (coronary artery disease)    Toprol-XL 25 mg oral tablet, extended release  -- 0.5 tab(s) by mouth once a day    Meds to beds  Room 4 421A  Discharge: 12/11/17 at 5pm   Pager: [phone]  -- Indication: For CAD (coronary artery disease)    alendronate 70 mg oral tablet  -- 1 tab(s) by mouth once a week  -- Indication: For osteoporosis    furosemide 40 mg oral tablet  -- 1 tab(s) by mouth once a day    Meds to beds  Room 4 421A  Discharge: 12/11/17 at 5pm   Pager: [phone]  -- Indication: For Heart failure    raNITIdine 150 mg oral capsule  -- 1 cap(s) by mouth 2 times a day  -- Indication: For peptic ulcer prophylaxis    FeroSul 325 mg (65 mg elemental iron) oral tablet  -- 1 tab(s) by mouth once a day   -- Indication: For iron supplement    montelukast 10 mg oral tablet  -- 1 tab(s) by mouth once a day (at bedtime)  -- Indication: For bronchospasm    levothyroxine 50 mcg (0.05 mg) oral tablet  -- 1 tab(s) by mouth once a day  -- Indication: For Hypothyroidism    Vol-Care Rx oral tablet  -- 1 tab(s) by mouth once a day  -- Indication: For Supplements

## 2018-01-25 NOTE — DISCHARGE NOTE ADULT - PRINCIPAL DIAGNOSIS
Chronic systolic CHF (congestive heart failure) Acute on chronic systolic (congestive) heart failure

## 2018-01-25 NOTE — PROGRESS NOTE ADULT - SUBJECTIVE AND OBJECTIVE BOX
Dr. Muhammad (Nephrology)  Office [phone]  Cell [phone]  [facility] FIELD  Cell [phone]      Patient is a 72y old  Female who presents with a chief complaint of SOB (19 Jan 2018 09:48)      Patient seen and examined at bedside. No chest pain/sob    VITALS:  T(F): 97.8 (01-25-18 @ 05:24), Max: 97.8 (01-25-18 @ 05:24)  HR: 82 (01-25-18 @ 05:24)  BP: 121/71 (01-25-18 @ 05:24)  RR: 17 (01-25-18 @ 05:24)  SpO2: 100% (01-25-18 @ 05:24)  Wt(kg): --    01-24 @ 07:01  -  01-25 @ 07:00  --------------------------------------------------------  IN: 550 mL / OUT: 0 mL / NET: 550 mL    01-25 @ 07:01  -  01-25 @ 11:06  --------------------------------------------------------  IN: 200 mL / OUT: 0 mL / NET: 200 mL          PHYSICAL EXAM:  Constitutional: NAD  Neck: No JVD  Respiratory: CTAB, no wheezes, rales or rhonchi  Cardiovascular: S1, S2, RRR  Gastrointestinal: BS+, soft, NT/ND  Extremities: No peripheral edema    Hospital Medications:   MEDICATIONS  (STANDING):  aspirin enteric coated 81 milliGRAM(s) Oral daily  atorvastatin 40 milliGRAM(s) Oral at bedtime  dextrose 5%. 1000 milliLiter(s) (50 mL/Hr) IV Continuous <Continuous>  dextrose 50% Injectable 12.5 Gram(s) IV Push once  dextrose 50% Injectable 25 Gram(s) IV Push once  dextrose 50% Injectable 25 Gram(s) IV Push once  famotidine    Tablet 20 milliGRAM(s) Oral daily  furosemide    Tablet 40 milliGRAM(s) Oral daily  heparin  Injectable 5000 Unit(s) SubCutaneous every 12 hours  insulin glargine Injectable (LANTUS) 60 Unit(s) SubCutaneous at bedtime  insulin lispro (HumaLOG) corrective regimen sliding scale   SubCutaneous three times a day before meals  insulin lispro (HumaLOG) corrective regimen sliding scale   SubCutaneous at bedtime  insulin lispro Injectable (HumaLOG) 20 Unit(s) SubCutaneous three times a day before meals  levothyroxine 50 MICROGram(s) Oral daily  metoprolol succinate ER 12.5 milliGRAM(s) Oral daily  montelukast 10 milliGRAM(s) Oral at bedtime  sodium bicarbonate 650 milliGRAM(s) Oral three times a day  sodium chloride 0.9% lock flush 3 milliLiter(s) IV Push every 8 hours  ticagrelor 90 milliGRAM(s) Oral two times a day      LABS:  01-25    138  |  99  |  56<H>  ----------------------------<  223<H>  3.2<L>   |  22  |  1.75<H>    Ca    9.2      25 Jan 2018 06:30  Mg     2.1     01-25      Creatinine Trend: 1.75 <--, 2.18 <--, 2.31 <--, 2.06 <--, 1.64 <--, 1.53 <--, 1.68 <--, 1.45 <--                                10.1   8.89  )-----------( 312      ( 25 Jan 2018 06:30 )             32.1     Urine Studies:  Urinalysis - [01-23-18 @ 20:09]      Color PLYEL / Appearance CLEAR / SG 1.015 / pH 5.5      Gluc 250 / Ketone NEGATIVE  / Bili NEGATIVE / Urobili NORMAL       Blood NEGATIVE / Protein 30 / Leuk Est TRACE / Nitrite NEGATIVE      RBC 0-2 / WBC 2-5 / Hyaline  / Gran  / Sq Epi OCC / Non Sq Epi  / Bacteria     Urine Creatinine 90.38      [01-23-18 @ 20:09]  Urine Urea Nitrogen 765.1      [01-23-18 @ 20:09]    Iron 40, TIBC 377, %sat --      [01-20-18 @ 06:06]  Ferritin 38.35      [01-20-18 @ 06:06]  HbA1c 9.1      [11-25-17 @ 06:30]  TSH 0.55      [01-19-18 @ 06:45]  Lipid: chol 130, , HDL 30, LDL 70      [01-19-18 @ 06:45]        RADIOLOGY & ADDITIONAL STUDIES:

## 2018-01-26 LAB
BUN SERPL-MCNC: 51 MG/DL — HIGH (ref 7–23)
CALCIUM SERPL-MCNC: 8.7 MG/DL — SIGNIFICANT CHANGE UP (ref 8.4–10.5)
CHLORIDE SERPL-SCNC: 101 MMOL/L — SIGNIFICANT CHANGE UP (ref 98–107)
CO2 SERPL-SCNC: 21 MMOL/L — LOW (ref 22–31)
CREAT SERPL-MCNC: 1.77 MG/DL — HIGH (ref 0.5–1.3)
GLUCOSE BLDC GLUCOMTR-MCNC: 250 MG/DL — HIGH (ref 70–99)
GLUCOSE BLDC GLUCOMTR-MCNC: 283 MG/DL — HIGH (ref 70–99)
GLUCOSE BLDC GLUCOMTR-MCNC: 360 MG/DL — HIGH (ref 70–99)
GLUCOSE BLDC GLUCOMTR-MCNC: 381 MG/DL — HIGH (ref 70–99)
GLUCOSE BLDC GLUCOMTR-MCNC: 424 MG/DL — HIGH (ref 70–99)
GLUCOSE SERPL-MCNC: 224 MG/DL — HIGH (ref 70–99)
HCT VFR BLD CALC: 31.6 % — LOW (ref 34.5–45)
HGB BLD-MCNC: 9.9 G/DL — LOW (ref 11.5–15.5)
MAGNESIUM SERPL-MCNC: 2.3 MG/DL — SIGNIFICANT CHANGE UP (ref 1.6–2.6)
MCHC RBC-ENTMCNC: 25.9 PG — LOW (ref 27–34)
MCHC RBC-ENTMCNC: 31.3 % — LOW (ref 32–36)
MCV RBC AUTO: 82.7 FL — SIGNIFICANT CHANGE UP (ref 80–100)
NRBC # FLD: 0 — SIGNIFICANT CHANGE UP
PLATELET # BLD AUTO: 305 K/UL — SIGNIFICANT CHANGE UP (ref 150–400)
PMV BLD: 9.6 FL — SIGNIFICANT CHANGE UP (ref 7–13)
POTASSIUM SERPL-MCNC: 3.8 MMOL/L — SIGNIFICANT CHANGE UP (ref 3.5–5.3)
POTASSIUM SERPL-SCNC: 3.8 MMOL/L — SIGNIFICANT CHANGE UP (ref 3.5–5.3)
RBC # BLD: 3.82 M/UL — SIGNIFICANT CHANGE UP (ref 3.8–5.2)
RBC # FLD: 14.4 % — SIGNIFICANT CHANGE UP (ref 10.3–14.5)
SODIUM SERPL-SCNC: 139 MMOL/L — SIGNIFICANT CHANGE UP (ref 135–145)
WBC # BLD: 9.55 K/UL — SIGNIFICANT CHANGE UP (ref 3.8–10.5)
WBC # FLD AUTO: 9.55 K/UL — SIGNIFICANT CHANGE UP (ref 3.8–10.5)

## 2018-01-26 RX ORDER — FUROSEMIDE 40 MG
40 TABLET ORAL DAILY
Qty: 0 | Refills: 0 | Status: DISCONTINUED | OUTPATIENT
Start: 2018-01-26 | End: 2018-01-30

## 2018-01-26 RX ADMIN — Medication 6: at 21:53

## 2018-01-26 RX ADMIN — FAMOTIDINE 20 MILLIGRAM(S): 10 INJECTION INTRAVENOUS at 12:11

## 2018-01-26 RX ADMIN — SODIUM CHLORIDE 3 MILLILITER(S): 9 INJECTION INTRAMUSCULAR; INTRAVENOUS; SUBCUTANEOUS at 21:07

## 2018-01-26 RX ADMIN — Medication 650 MILLIGRAM(S): at 21:07

## 2018-01-26 RX ADMIN — Medication 650 MILLIGRAM(S): at 05:00

## 2018-01-26 RX ADMIN — Medication 24 UNIT(S): at 12:46

## 2018-01-26 RX ADMIN — Medication 24 UNIT(S): at 18:20

## 2018-01-26 RX ADMIN — Medication 4: at 18:20

## 2018-01-26 RX ADMIN — Medication 50 MICROGRAM(S): at 05:00

## 2018-01-26 RX ADMIN — TICAGRELOR 90 MILLIGRAM(S): 90 TABLET ORAL at 05:00

## 2018-01-26 RX ADMIN — SODIUM CHLORIDE 3 MILLILITER(S): 9 INJECTION INTRAMUSCULAR; INTRAVENOUS; SUBCUTANEOUS at 14:05

## 2018-01-26 RX ADMIN — Medication 24 UNIT(S): at 09:32

## 2018-01-26 RX ADMIN — SODIUM CHLORIDE 3 MILLILITER(S): 9 INJECTION INTRAMUSCULAR; INTRAVENOUS; SUBCUTANEOUS at 04:48

## 2018-01-26 RX ADMIN — Medication 12.5 MILLIGRAM(S): at 04:59

## 2018-01-26 RX ADMIN — Medication 650 MILLIGRAM(S): at 14:05

## 2018-01-26 RX ADMIN — Medication 6: at 09:31

## 2018-01-26 RX ADMIN — MONTELUKAST 10 MILLIGRAM(S): 4 TABLET, CHEWABLE ORAL at 21:07

## 2018-01-26 RX ADMIN — INSULIN GLARGINE 68 UNIT(S): 100 INJECTION, SOLUTION SUBCUTANEOUS at 21:53

## 2018-01-26 RX ADMIN — Medication 10: at 12:45

## 2018-01-26 RX ADMIN — TICAGRELOR 90 MILLIGRAM(S): 90 TABLET ORAL at 18:22

## 2018-01-26 RX ADMIN — Medication 40 MILLIGRAM(S): at 05:00

## 2018-01-26 RX ADMIN — ATORVASTATIN CALCIUM 40 MILLIGRAM(S): 80 TABLET, FILM COATED ORAL at 21:07

## 2018-01-26 RX ADMIN — Medication 81 MILLIGRAM(S): at 12:11

## 2018-01-26 RX ADMIN — HEPARIN SODIUM 5000 UNIT(S): 5000 INJECTION INTRAVENOUS; SUBCUTANEOUS at 18:22

## 2018-01-26 NOTE — PROGRESS NOTE ADULT - SUBJECTIVE AND OBJECTIVE BOX
Chief complaint  Patient is a 72y old  Female who presents with a chief complaint of SOB (25 Jan 2018 15:14)   Review of systems  Patient in bed, looks comfortable, no fever, no hypoglycemia.    Labs and Fingersticks  CAPILLARY BLOOD GLUCOSE      POCT Blood Glucose.: 250 mg/dL (26 Jan 2018 17:56)  POCT Blood Glucose.: 381 mg/dL (26 Jan 2018 12:35)  POCT Blood Glucose.: 424 mg/dL (26 Jan 2018 12:33)  POCT Blood Glucose.: 283 mg/dL (26 Jan 2018 08:50)  POCT Blood Glucose.: 161 mg/dL (25 Jan 2018 22:12)          Calcium, Total Serum: 8.7 (01-26 @ 05:20)  Calcium, Total Serum: 9.2 (01-25 @ 06:30)          01-26    139  |  101  |  51<H>  ----------------------------<  224<H>  3.8   |  21<L>  |  1.77<H>    Ca    8.7      26 Jan 2018 05:20  Mg     2.3     01-26                          9.9    9.55  )-----------( 305      ( 26 Jan 2018 05:20 )             31.6     Medications  MEDICATIONS  (STANDING):  aspirin enteric coated 81 milliGRAM(s) Oral daily  atorvastatin 40 milliGRAM(s) Oral at bedtime  dextrose 5%. 1000 milliLiter(s) (50 mL/Hr) IV Continuous <Continuous>  dextrose 50% Injectable 12.5 Gram(s) IV Push once  dextrose 50% Injectable 25 Gram(s) IV Push once  dextrose 50% Injectable 25 Gram(s) IV Push once  famotidine    Tablet 20 milliGRAM(s) Oral daily  furosemide   Injectable 40 milliGRAM(s) IV Push daily  heparin  Injectable 5000 Unit(s) SubCutaneous every 12 hours  insulin glargine Injectable (LANTUS) 68 Unit(s) SubCutaneous at bedtime  insulin lispro (HumaLOG) corrective regimen sliding scale   SubCutaneous three times a day before meals  insulin lispro (HumaLOG) corrective regimen sliding scale   SubCutaneous at bedtime  insulin lispro Injectable (HumaLOG) 24 Unit(s) SubCutaneous three times a day before meals  levothyroxine 50 MICROGram(s) Oral daily  metoprolol succinate ER 12.5 milliGRAM(s) Oral daily  montelukast 10 milliGRAM(s) Oral at bedtime  sodium bicarbonate 650 milliGRAM(s) Oral three times a day  sodium chloride 0.9% lock flush 3 milliLiter(s) IV Push every 8 hours  ticagrelor 90 milliGRAM(s) Oral two times a day      Physical Exam  General: Patient comfortable in bed  Vital Signs Last 12 Hrs  T(F): 98.2 (01-26-18 @ 12:17), Max: 98.2 (01-26-18 @ 12:17)  HR: 82 (01-26-18 @ 18:18) (82 - 83)  BP: 110/65 (01-26-18 @ 18:18) (110/65 - 120/59)  BP(mean): --  RR: 18 (01-26-18 @ 12:17) (18 - 18)  SpO2: 100% (01-26-18 @ 12:17) (100% - 100%)  Neck: No palpable thyroid nodules.  CVS: S1S2, No murmurs  Respiratory: No wheezing, no crepitations  GI: Abdomen soft, bowel sounds positive  Musculoskeletal:  edema lower extremities.   Skin: No skin rashes, no ecchymosis    Diagnostics

## 2018-01-26 NOTE — PROGRESS NOTE ADULT - ASSESSMENT
Assessment  DMT2: 72y Female with DM T2 with hyperglycemia on insulin, blood sugars trending down, no hypoglycemia,  eating meals,  non compliant with low carb diet.  Hypothyroidism: On synthroid 50 mcg po daily,  HTN: Controlled, On med.  CHF: On medications, monitored.

## 2018-01-26 NOTE — PROGRESS NOTE ADULT - SUBJECTIVE AND OBJECTIVE BOX
Subjective:  no chest pain SOB improved        family at bedside   	   MEDICATIONS  (STANDING):  aspirin enteric coated 81 milliGRAM(s) Oral daily  atorvastatin 40 milliGRAM(s) Oral at bedtime  dextrose 5%. 1000 milliLiter(s) (50 mL/Hr) IV Continuous <Continuous>  dextrose 50% Injectable 12.5 Gram(s) IV Push once  dextrose 50% Injectable 25 Gram(s) IV Push once  dextrose 50% Injectable 25 Gram(s) IV Push once  famotidine    Tablet 20 milliGRAM(s) Oral daily  furosemide   Injectable 40 milliGRAM(s) IV Push daily  heparin  Injectable 5000 Unit(s) SubCutaneous every 12 hours  insulin glargine Injectable (LANTUS) 68 Unit(s) SubCutaneous at bedtime  insulin lispro (HumaLOG) corrective regimen sliding scale   SubCutaneous three times a day before meals  insulin lispro (HumaLOG) corrective regimen sliding scale   SubCutaneous at bedtime  insulin lispro Injectable (HumaLOG) 24 Unit(s) SubCutaneous three times a day before meals  levothyroxine 50 MICROGram(s) Oral daily  metoprolol succinate ER 12.5 milliGRAM(s) Oral daily  montelukast 10 milliGRAM(s) Oral at bedtime  sodium bicarbonate 650 milliGRAM(s) Oral three times a day  sodium chloride 0.9% lock flush 3 milliLiter(s) IV Push every 8 hours  ticagrelor 90 milliGRAM(s) Oral two times a day    MEDICATIONS  (PRN):  dextrose Gel 1 Dose(s) Oral once PRN Blood Glucose LESS THAN 70 milliGRAM(s)/deciliter  glucagon  Injectable 1 milliGRAM(s) IntraMuscular once PRN Glucose LESS THAN 70 milligrams/deciliter      LABS:                        9.9    9.55  )-----------( 305      ( 26 Jan 2018 05:20 )             31.6     Hemoglobin: 9.9 g/dL (01-26 @ 05:20)  Hemoglobin: 10.1 g/dL (01-25 @ 06:30)  Hemoglobin: 10.0 g/dL (01-24 @ 05:10)  Hemoglobin: 11.0 g/dL (01-23 @ 05:20)  Hemoglobin: 10.0 g/dL (01-22 @ 05:00)    01-26    139  |  101  |  51<H>  ----------------------------<  224<H>  3.8   |  21<L>  |  1.77<H>    Ca    8.7      26 Jan 2018 05:20  Mg     2.3     01-26      Creatinine Trend: 1.77<--, 1.75<--, 2.18<--, 2.31<--, 2.06<--, 1.64<--     CARDIAC MARKERS ( 24 Jan 2018 05:10 )  x     / < 0.06 ng/mL / 69 u/L / 3.78 ng/mL / x            PHYSICAL EXAM  Vital Signs Last 24 Hrs  T(C): 36.8 (26 Jan 2018 12:17), Max: 36.8 (26 Jan 2018 12:17)  T(F): 98.2 (26 Jan 2018 12:17), Max: 98.2 (26 Jan 2018 12:17)  HR: 83 (26 Jan 2018 12:17) (80 - 83)  BP: 120/59 (26 Jan 2018 12:17) (120/59 - 122/70)  BP(mean): --  RR: 18 (26 Jan 2018 12:17) (15 - 18)  SpO2: 100% (26 Jan 2018 12:17) (98% - 100%)    Cardiovascular: S1S2 RRR  Respiratory: CTA BL with decreased breath sounds B/L LLs  Gastrointestinal:  Soft, Non-tender, + BS	  Extremities No edema B/L LE's     DIAGNOSTIC DATA:     TELEMETRY: SR 	       < from: Cardiac Cath Lab - Adult (10.13.17 @ 10:40) >  VENTRICLES: No left ventriculogram was performed.  CORONARY VESSELS: The coronary circulation is right dominant.  LM:   --  LM: Normal.  LAD:   --  Proximal LAD: There was a diffuse 60 % stenosis.  --  Mid LAD: There was a 100 % stenosis with the distal vessel being filled  via LIMA-LAD.  CX:   --  Circumflex: The vessel was small sized. Angiography showed mild  atherosclerosis withno flow limiting lesions.  --  OM1: Angiography showed mild atherosclerosis with no flow limiting  lesions.  RI:   --  Ostial ramus intermedius: There was a tubular 80 % stenosis.  There was PARUL grade 3 flow through the vessel (brisk flow).  RCA:   --  Proximal RCA: Angiography showed mild atherosclerosis with no  flow limiting lesions.  --  Mid RCA: There was a 100 % stenosis with the distal vessel being filled  via collaterals. This lesion is a chronic total occlusion.  GRAFTS:   --  Graft to the LAD: The graft was a LIMA. Graft angiography  showed minor luminal irregularities. Distal vessel angiography showed  minor luminal irregularities.  AORTA: Ascending aorta: Normal. No additional grafts visualized in  aortogram.  COMPLICATIONS: There were no complications.  DIAGNOSTIC RECOMMENDATIONS: Coronary angiogram demonstrates patent  LIMA-LAD, closed SVG grafts, and a severe stenosis in the ostial Ramus.  Will perform staged PCI to RI when Cr stable and renally optimized.  Prepared and signed by  Corie Ga M.D.  Signed 10/17/2017 13:49:15    < end of copied text >    < from: Cardiac Cath Lab - Adult (11.17.17 @ 11:48) >  PROCEDURE:  --  Intervention on ramus intermedius: drug-eluting stent.    VENTRICLES: No LV gram was performed; however, a recent echocardiogram  demonstrated normal global and regional LV function.  CORONARY VESSELS: The coronary circulation is right dominant.  LM:   --  LM: Angiography showed minor luminal irregularities with no flow  limiting lesions.  LAD:   --  Ostial LAD: There was a 100 % stenosis.  CX:   --  Circumflex: This vessel was not injected, but was visualized  during a prior cardiac catheterization.  RI:   --  Ramus intermedius: There was a 95 % stenosis.  RCA:   --  RCA: This vessel was not injected.  COMPLICATIONS: There were no complications.  DIAGNOSTIC RECOMMENDATIONS: The patient should continue with the present  medications. will add plavix 75mg po once a day/ will add ECASA 325mg po  once day.  Prepared and signed by  Roberta Qiuck M.D.  Signed 11/17/2017 13:16:01    < end of copied text >    < from: TTE with Doppler (w/Cont) (11.25.17 @ 12:33) >  CONCLUSIONS:  1. Mitral annular calcification, otherwise normal mitral  valve. Mild mitral regurgitation.  2. Normal left ventricular internal dimensions and wall  thicknesses.  3. Endocardium not well visualized; grossly moderate to  severe segmental left ventricularsystolic dysfunction.  Hypokinesis of the inferior, lateral, and inferolateral  walls.  Endocardial visualization enhanced with intravenous  injection of echo contrast (Definity).  No LV thrombus  seen.  4. The right ventricle is not well visualized;grossly  normal right ventricular systolic function.  ------------------------------------------------------------------------  Confirmed on  11/25/2017 - 14:44:41 by Jose Lucia M.D.    < end of copied text >    < from: Cardiac Cath Lab - Adult (12.05.17 @ 12:48) >  VENTRICLES: No LV gram was performed; however, a recent echocardiogram  demonstrated an EF of 36 %.  CORONARY VESSELS: The coronary circulation is right dominant.  LM:   --  Distal left main: Angiography showed minor luminal irregularities  with no flow limiting lesions.  LAD:   --  Mid LAD: There was a 100 % stenosis with the distal vessel being  filled via LIMA-LAD.  CX:   --  Proximal circumflex: There was a tubular 20 % stenosis.  --  Mid circumflex: Angiography showed minor luminal irregularities with no  flow limiting lesions.  --  Distal circumflex: Angiography showed minor luminal irregularities with  no flow limiting lesions.  --  OM1: The vessel was small sized. There was a discrete 60 % stenosis.  RI:   --  Ostial ramus intermedius: Angiography showed minor luminal  irregularities with no flow limiting lesions. There was no significant  restenosis in RI stent.  --  Proximal ramus intermedius: Angiography showed minor luminal  irregularities with no flow limiting lesions.  --  Mid ramus intermedius: There was a tubular 80 % stenosis. The lesion  was associated with a small filling defect consistent with thrombus.  RCA:   --  Mid RCA: There was a 100 % stenosis with the distal vessel being  filled via collaterals from the LAD.  GRAFTS:   --  Graft to the LAD: The graft was a LIMA. Graft angiography  showed minor luminal irregularities. Distal vessel angiography showed  minor luminalirregularities.  COMPLICATIONS: There were no complications.  DIAGNOSTIC RECOMMENDATIONS: Coronary angiogram demonstrates a severe  stenosis in the ramus intermedius that is the cause of the patient's  clinical presentation. Will therefore perform PCI to the RI.  INTERVENTIONAL RECOMMENDATIONS: S/p successful CORNELIA to the Ramus  Intermedius. The patient should continue with ASA and Brilinta for at  least 12 months.  Prepared and signed by  Corie Ga M.D.  Signed 12/06/2017 17:06:30    < from: CT Chest No Cont (01.20.18 @ 09:55) >  IMPRESSION:   Mild pulmonary edema with small simple bilateral pleural effusions, right   greater than left, with no evidence of loculation.    < end of copied text >    < from: TTE with Doppler (w/Cont) (01.23.18 @ 12:31) >  CONCLUSIONS:  1. Mitral annular calcification, otherwise normal mitral  valve. Mild-moderate mitral regurgitation.  2. Calcified trileaflet aortic valve with normal opening.  Mild aortic regurgitation.  3. Normal left ventricular internal dimensions and wall  thicknesses.  4. Endocardium not well visualized; grossly severe global  left ventricular systolic dysfunction.  Endocardial  visualization enhanced with intravenous injection of echo  contrast (Definity).  5. The right ventricle is not well visualized; grossly  normal right ventricular systolic function.  *** Compared with echocardiogram of 11/25/2017, no  significant changes noted.  ------------------------------------------------------------------------  Confirmed on  1/23/2018 - 14:35:49 by Jose Lucia M.D.    < end of copied text >      ASSESSMENT/PLAN: 	72y Female w/ PMH HTN, CKD, Hyperlipidemia, DM-II, CAD s/p 3V CABG  (LIMA to LAD, SVG to OM, SVG to PDA) at [facility] 2014, s/p CORNELIA TO RI 11/17/17, NSTEMI 12/2017 s/p C on 12/5 and CORNELIA to [facility], TTE at that time revealed grossly moderate to severe LV dysfunction with hypokinesis of the inferior, lateral and inferolateral with an EF of 36% admitted with acute on chronic systolic CHF     --C/W IV  Lasix.  Keep O>I monitor K & cr levels   --mildly elevated trops noted in setting of CKD & CHF exacerbation  - no need for urgent repeat cath at this time  --CT chest noted above  --Repeat TTE unchanged from prior.  EF 32%  --Cont. DAPT for recent CORNELIA  eventual stress test when medically stable

## 2018-01-26 NOTE — PROGRESS NOTE ADULT - SUBJECTIVE AND OBJECTIVE BOX
Dr. Muhammad (Nephrology)  Office [phone]  Cell [phone]  [facility]  Cell [phone]      Patient is a 72y old  Female who presents with a chief complaint of SOB (25 Jan 2018 15:14)      Patient seen and examined at bedside. No chest pain/sob    VITALS:  T(F): 97.7 (01-26-18 @ 04:57), Max: 98 (01-25-18 @ 21:10)  HR: 82 (01-26-18 @ 04:57)  BP: 120/68 (01-26-18 @ 04:57)  RR: 16 (01-26-18 @ 04:57)  SpO2: 98% (01-26-18 @ 04:57)  Wt(kg): --    01-25 @ 07:01  -  01-26 @ 07:00  --------------------------------------------------------  IN: 620 mL / OUT: 0 mL / NET: 620 mL    01-26 @ 07:01  -  01-26 @ 11:16  --------------------------------------------------------  IN: 237 mL / OUT: 0 mL / NET: 237 mL          PHYSICAL EXAM:  Constitutional: NAD  Neck: No JVD  Respiratory: CTAB, no wheezes, rales or rhonchi  Cardiovascular: S1, S2, RRR  Gastrointestinal: BS+, soft, NT/ND  Extremities: No peripheral edema    Hospital Medications:   MEDICATIONS  (STANDING):  aspirin enteric coated 81 milliGRAM(s) Oral daily  atorvastatin 40 milliGRAM(s) Oral at bedtime  dextrose 5%. 1000 milliLiter(s) (50 mL/Hr) IV Continuous <Continuous>  dextrose 50% Injectable 12.5 Gram(s) IV Push once  dextrose 50% Injectable 25 Gram(s) IV Push once  dextrose 50% Injectable 25 Gram(s) IV Push once  famotidine    Tablet 20 milliGRAM(s) Oral daily  furosemide   Injectable 40 milliGRAM(s) IV Push daily  heparin  Injectable 5000 Unit(s) SubCutaneous every 12 hours  insulin glargine Injectable (LANTUS) 68 Unit(s) SubCutaneous at bedtime  insulin lispro (HumaLOG) corrective regimen sliding scale   SubCutaneous three times a day before meals  insulin lispro (HumaLOG) corrective regimen sliding scale   SubCutaneous at bedtime  insulin lispro Injectable (HumaLOG) 24 Unit(s) SubCutaneous three times a day before meals  levothyroxine 50 MICROGram(s) Oral daily  metoprolol succinate ER 12.5 milliGRAM(s) Oral daily  montelukast 10 milliGRAM(s) Oral at bedtime  sodium bicarbonate 650 milliGRAM(s) Oral three times a day  sodium chloride 0.9% lock flush 3 milliLiter(s) IV Push every 8 hours  ticagrelor 90 milliGRAM(s) Oral two times a day      LABS:  01-26    139  |  101  |  51<H>  ----------------------------<  224<H>  3.8   |  21<L>  |  1.77<H>    Ca    8.7      26 Jan 2018 05:20  Mg     2.3     01-26      Creatinine Trend: 1.77 <--, 1.75 <--, 2.18 <--, 2.31 <--, 2.06 <--, 1.64 <--, 1.53 <--                                9.9    9.55  )-----------( 305      ( 26 Jan 2018 05:20 )             31.6     Urine Studies:  Urinalysis - [01-23-18 @ 20:09]      Color PLYEL / Appearance CLEAR / SG 1.015 / pH 5.5      Gluc 250 / Ketone NEGATIVE  / Bili NEGATIVE / Urobili NORMAL       Blood NEGATIVE / Protein 30 / Leuk Est TRACE / Nitrite NEGATIVE      RBC 0-2 / WBC 2-5 / Hyaline  / Gran  / Sq Epi OCC / Non Sq Epi  / Bacteria     Urine Creatinine 90.38      [01-23-18 @ 20:09]  Urine Urea Nitrogen 765.1      [01-23-18 @ 20:09]    Iron 40, TIBC 377, %sat --      [01-20-18 @ 06:06]  Ferritin 38.35      [01-20-18 @ 06:06]  HbA1c 9.1      [11-25-17 @ 06:30]  TSH 0.55      [01-19-18 @ 06:45]  Lipid: chol 130, , HDL 30, LDL 70      [01-19-18 @ 06:45]        RADIOLOGY & ADDITIONAL STUDIES:

## 2018-01-27 LAB
BUN SERPL-MCNC: 50 MG/DL — HIGH (ref 7–23)
CALCIUM SERPL-MCNC: 9.3 MG/DL — SIGNIFICANT CHANGE UP (ref 8.4–10.5)
CHLORIDE SERPL-SCNC: 99 MMOL/L — SIGNIFICANT CHANGE UP (ref 98–107)
CO2 SERPL-SCNC: 19 MMOL/L — LOW (ref 22–31)
CREAT SERPL-MCNC: 1.6 MG/DL — HIGH (ref 0.5–1.3)
GLUCOSE BLDC GLUCOMTR-MCNC: 261 MG/DL — HIGH (ref 70–99)
GLUCOSE BLDC GLUCOMTR-MCNC: 308 MG/DL — HIGH (ref 70–99)
GLUCOSE BLDC GLUCOMTR-MCNC: 318 MG/DL — HIGH (ref 70–99)
GLUCOSE BLDC GLUCOMTR-MCNC: 368 MG/DL — HIGH (ref 70–99)
GLUCOSE SERPL-MCNC: 226 MG/DL — HIGH (ref 70–99)
HCT VFR BLD CALC: 33.5 % — LOW (ref 34.5–45)
HGB BLD-MCNC: 10.5 G/DL — LOW (ref 11.5–15.5)
MAGNESIUM SERPL-MCNC: 2.2 MG/DL — SIGNIFICANT CHANGE UP (ref 1.6–2.6)
MCHC RBC-ENTMCNC: 25.9 PG — LOW (ref 27–34)
MCHC RBC-ENTMCNC: 31.3 % — LOW (ref 32–36)
MCV RBC AUTO: 82.7 FL — SIGNIFICANT CHANGE UP (ref 80–100)
NRBC # FLD: 0 — SIGNIFICANT CHANGE UP
PLATELET # BLD AUTO: 335 K/UL — SIGNIFICANT CHANGE UP (ref 150–400)
PMV BLD: 9.6 FL — SIGNIFICANT CHANGE UP (ref 7–13)
POTASSIUM SERPL-MCNC: 3.8 MMOL/L — SIGNIFICANT CHANGE UP (ref 3.5–5.3)
POTASSIUM SERPL-SCNC: 3.8 MMOL/L — SIGNIFICANT CHANGE UP (ref 3.5–5.3)
RBC # BLD: 4.05 M/UL — SIGNIFICANT CHANGE UP (ref 3.8–5.2)
RBC # FLD: 14.5 % — SIGNIFICANT CHANGE UP (ref 10.3–14.5)
SODIUM SERPL-SCNC: 138 MMOL/L — SIGNIFICANT CHANGE UP (ref 135–145)
WBC # BLD: 10.14 K/UL — SIGNIFICANT CHANGE UP (ref 3.8–10.5)
WBC # FLD AUTO: 10.14 K/UL — SIGNIFICANT CHANGE UP (ref 3.8–10.5)

## 2018-01-27 RX ORDER — INSULIN GLARGINE 100 [IU]/ML
20 INJECTION, SOLUTION SUBCUTANEOUS EVERY MORNING
Qty: 0 | Refills: 0 | Status: DISCONTINUED | OUTPATIENT
Start: 2018-01-27 | End: 2018-02-09

## 2018-01-27 RX ADMIN — Medication 650 MILLIGRAM(S): at 21:18

## 2018-01-27 RX ADMIN — Medication 650 MILLIGRAM(S): at 13:24

## 2018-01-27 RX ADMIN — ATORVASTATIN CALCIUM 40 MILLIGRAM(S): 80 TABLET, FILM COATED ORAL at 21:18

## 2018-01-27 RX ADMIN — Medication 12.5 MILLIGRAM(S): at 05:35

## 2018-01-27 RX ADMIN — Medication 81 MILLIGRAM(S): at 12:54

## 2018-01-27 RX ADMIN — HEPARIN SODIUM 5000 UNIT(S): 5000 INJECTION INTRAVENOUS; SUBCUTANEOUS at 17:50

## 2018-01-27 RX ADMIN — SODIUM CHLORIDE 3 MILLILITER(S): 9 INJECTION INTRAMUSCULAR; INTRAVENOUS; SUBCUTANEOUS at 13:24

## 2018-01-27 RX ADMIN — Medication 50 MICROGRAM(S): at 05:27

## 2018-01-27 RX ADMIN — MONTELUKAST 10 MILLIGRAM(S): 4 TABLET, CHEWABLE ORAL at 21:18

## 2018-01-27 RX ADMIN — HEPARIN SODIUM 5000 UNIT(S): 5000 INJECTION INTRAVENOUS; SUBCUTANEOUS at 05:27

## 2018-01-27 RX ADMIN — Medication 24 UNIT(S): at 13:22

## 2018-01-27 RX ADMIN — INSULIN GLARGINE 68 UNIT(S): 100 INJECTION, SOLUTION SUBCUTANEOUS at 23:23

## 2018-01-27 RX ADMIN — Medication 10: at 13:22

## 2018-01-27 RX ADMIN — FAMOTIDINE 20 MILLIGRAM(S): 10 INJECTION INTRAVENOUS at 12:54

## 2018-01-27 RX ADMIN — Medication 24 UNIT(S): at 08:50

## 2018-01-27 RX ADMIN — Medication 6: at 17:50

## 2018-01-27 RX ADMIN — Medication 8: at 08:50

## 2018-01-27 RX ADMIN — Medication 4: at 23:23

## 2018-01-27 RX ADMIN — SODIUM CHLORIDE 3 MILLILITER(S): 9 INJECTION INTRAMUSCULAR; INTRAVENOUS; SUBCUTANEOUS at 05:28

## 2018-01-27 RX ADMIN — Medication 40 MILLIGRAM(S): at 05:27

## 2018-01-27 RX ADMIN — Medication 650 MILLIGRAM(S): at 05:27

## 2018-01-27 RX ADMIN — TICAGRELOR 90 MILLIGRAM(S): 90 TABLET ORAL at 05:27

## 2018-01-27 RX ADMIN — Medication 24 UNIT(S): at 17:51

## 2018-01-27 RX ADMIN — TICAGRELOR 90 MILLIGRAM(S): 90 TABLET ORAL at 17:52

## 2018-01-27 NOTE — PROGRESS NOTE ADULT - ASSESSMENT
Assessment  DMT2: 72y Female with DM T2 with hyperglycemia on insulin, blood sugars trending up, no hypoglycemia,  eating meals,  non compliant with low carb diet.  Hypothyroidism: On synthroid 50 mcg po daily,  HTN: Controlled, On med.  CHF: On medications, monitored.

## 2018-01-27 NOTE — PROGRESS NOTE ADULT - SUBJECTIVE AND OBJECTIVE BOX
Subjective:  no chest pain SOB improved felling tired today         family at bedside   	     MEDICATIONS  (STANDING):  aspirin enteric coated 81 milliGRAM(s) Oral daily  atorvastatin 40 milliGRAM(s) Oral at bedtime  dextrose 5%. 1000 milliLiter(s) (50 mL/Hr) IV Continuous <Continuous>  dextrose 50% Injectable 12.5 Gram(s) IV Push once  dextrose 50% Injectable 25 Gram(s) IV Push once  dextrose 50% Injectable 25 Gram(s) IV Push once  famotidine    Tablet 20 milliGRAM(s) Oral daily  furosemide   Injectable 40 milliGRAM(s) IV Push daily  heparin  Injectable 5000 Unit(s) SubCutaneous every 12 hours  insulin glargine Injectable (LANTUS) 20 Unit(s) SubCutaneous every morning  insulin glargine Injectable (LANTUS) 68 Unit(s) SubCutaneous at bedtime  insulin lispro (HumaLOG) corrective regimen sliding scale   SubCutaneous three times a day before meals  insulin lispro (HumaLOG) corrective regimen sliding scale   SubCutaneous at bedtime  insulin lispro Injectable (HumaLOG) 24 Unit(s) SubCutaneous three times a day before meals  levothyroxine 50 MICROGram(s) Oral daily  metoprolol succinate ER 12.5 milliGRAM(s) Oral daily  montelukast 10 milliGRAM(s) Oral at bedtime  sodium bicarbonate 650 milliGRAM(s) Oral three times a day  sodium chloride 0.9% lock flush 3 milliLiter(s) IV Push every 8 hours  ticagrelor 90 milliGRAM(s) Oral two times a day    MEDICATIONS  (PRN):  dextrose Gel 1 Dose(s) Oral once PRN Blood Glucose LESS THAN 70 milliGRAM(s)/deciliter  glucagon  Injectable 1 milliGRAM(s) IntraMuscular once PRN Glucose LESS THAN 70 milligrams/deciliter      LABS:                        10.5   10.14 )-----------( 335      ( 27 Jan 2018 06:30 )             33.5     Hemoglobin: 10.5 g/dL (01-27 @ 06:30)  Hemoglobin: 9.9 g/dL (01-26 @ 05:20)  Hemoglobin: 10.1 g/dL (01-25 @ 06:30)  Hemoglobin: 10.0 g/dL (01-24 @ 05:10)  Hemoglobin: 11.0 g/dL (01-23 @ 05:20)    01-27    138  |  99  |  50<H>  ----------------------------<  226<H>  3.8   |  19<L>  |  1.60<H>    Ca    9.3      27 Jan 2018 06:30  Mg     2.2     01-27      Creatinine Trend: 1.60<--, 1.77<--, 1.75<--, 2.18<--, 2.31<--, 2.06<--     PHYSICAL EXAM  Vital Signs Last 24 Hrs  T(C): 36.6 (27 Jan 2018 13:51), Max: 36.7 (26 Jan 2018 20:57)  T(F): 97.9 (27 Jan 2018 13:51), Max: 98 (26 Jan 2018 20:57)  HR: 87 (27 Jan 2018 13:51) (78 - 87)  BP: 117/68 (27 Jan 2018 13:51) (110/65 - 137/60)  BP(mean): --  RR: 16 (27 Jan 2018 13:51) (16 - 17)  SpO2: 100% (27 Jan 2018 13:51) (98% - 100%)      Cardiovascular: S1S2 RRR  Respiratory: CTA BL with decreased breath sounds B/L LLs  Gastrointestinal:  Soft, Non-tender, + BS	  Extremities No edema, cyanosis or clubbing  B/L LE's     DIAGNOSTIC DATA:     TELEMETRY: SR 	       < from: Cardiac Cath Lab - Adult (10.13.17 @ 10:40) >  VENTRICLES: No left ventriculogram was performed.  CORONARY VESSELS: The coronary circulation is right dominant.  LM:   --  LM: Normal.  LAD:   --  Proximal LAD: There was a diffuse 60 % stenosis.  --  Mid LAD: There was a 100 % stenosis with the distal vessel being filled  via LIMA-LAD.  CX:   --  Circumflex: The vessel was small sized. Angiography showed mild  atherosclerosis withno flow limiting lesions.  --  OM1: Angiography showed mild atherosclerosis with no flow limiting  lesions.  RI:   --  Ostial ramus intermedius: There was a tubular 80 % stenosis.  There was PARUL grade 3 flow through the vessel (brisk flow).  RCA:   --  Proximal RCA: Angiography showed mild atherosclerosis with no  flow limiting lesions.  --  Mid RCA: There was a 100 % stenosis with the distal vessel being filled  via collaterals. This lesion is a chronic total occlusion.  GRAFTS:   --  Graft to the LAD: The graft was a LIMA. Graft angiography  showed minor luminal irregularities. Distal vessel angiography showed  minor luminal irregularities.  AORTA: Ascending aorta: Normal. No additional grafts visualized in  aortogram.  COMPLICATIONS: There were no complications.  DIAGNOSTIC RECOMMENDATIONS: Coronary angiogram demonstrates patent  LIMA-LAD, closed SVG grafts, and a severe stenosis in the ostial Ramus.  Will perform staged PCI to RI when Cr stable and renally optimized.  Prepared and signed by  Corie Ga M.D.  Signed 10/17/2017 13:49:15    < end of copied text >    < from: Cardiac Cath Lab - Adult (11.17.17 @ 11:48) >  PROCEDURE:  --  Intervention on ramus intermedius: drug-eluting stent.    VENTRICLES: No LV gram was performed; however, a recent echocardiogram  demonstrated normal global and regional LV function.  CORONARY VESSELS: The coronary circulation is right dominant.  LM:   --  LM: Angiography showed minor luminal irregularities with no flow  limiting lesions.  LAD:   --  Ostial LAD: There was a 100 % stenosis.  CX:   --  Circumflex: This vessel was not injected, but was visualized  during a prior cardiac catheterization.  RI:   --  Ramus intermedius: There was a 95 % stenosis.  RCA:   --  RCA: This vessel was not injected.  COMPLICATIONS: There were no complications.  DIAGNOSTIC RECOMMENDATIONS: The patient should continue with the present  medications. will add plavix 75mg po once a day/ will add ECASA 325mg po  once day.  Prepared and signed by  Roberta Quick M.D.  Signed 11/17/2017 13:16:01    < end of copied text >    < from: TTE with Doppler (w/Cont) (11.25.17 @ 12:33) >  CONCLUSIONS:  1. Mitral annular calcification, otherwise normal mitral  valve. Mild mitral regurgitation.  2. Normal left ventricular internal dimensions and wall  thicknesses.  3. Endocardium not well visualized; grossly moderate to  severe segmental left ventricularsystolic dysfunction.  Hypokinesis of the inferior, lateral, and inferolateral  walls.  Endocardial visualization enhanced with intravenous  injection of echo contrast (Definity).  No LV thrombus  seen.  4. The right ventricle is not well visualized;grossly  normal right ventricular systolic function.  ------------------------------------------------------------------------  Confirmed on  11/25/2017 - 14:44:41 by Jose Lucia M.D.    < end of copied text >    < from: Cardiac Cath Lab - Adult (12.05.17 @ 12:48) >  VENTRICLES: No LV gram was performed; however, a recent echocardiogram  demonstrated an EF of 36 %.  CORONARY VESSELS: The coronary circulation is right dominant.  LM:   --  Distal left main: Angiography showed minor luminal irregularities  with no flow limiting lesions.  LAD:   --  Mid LAD: There was a 100 % stenosis with the distal vessel being  filled via LIMA-LAD.  CX:   --  Proximal circumflex: There was a tubular 20 % stenosis.  --  Mid circumflex: Angiography showed minor luminal irregularities with no  flow limiting lesions.  --  Distal circumflex: Angiography showed minor luminal irregularities with  no flow limiting lesions.  --  OM1: The vessel was small sized. There was a discrete 60 % stenosis.  RI:   --  Ostial ramus intermedius: Angiography showed minor luminal  irregularities with no flow limiting lesions. There was no significant  restenosis in RI stent.  --  Proximal ramus intermedius: Angiography showed minor luminal  irregularities with no flow limiting lesions.  --  Mid ramus intermedius: There was a tubular 80 % stenosis. The lesion  was associated with a small filling defect consistent with thrombus.  RCA:   --  Mid RCA: There was a 100 % stenosis with the distal vessel being  filled via collaterals from the LAD.  GRAFTS:   --  Graft to the LAD: The graft was a LIMA. Graft angiography  showed minor luminal irregularities. Distal vessel angiography showed  minor luminalirregularities.  COMPLICATIONS: There were no complications.  DIAGNOSTIC RECOMMENDATIONS: Coronary angiogram demonstrates a severe  stenosis in the ramus intermedius that is the cause of the patient's  clinical presentation. Will therefore perform PCI to the RI.  INTERVENTIONAL RECOMMENDATIONS: S/p successful CORNELIA to the Ramus  Intermedius. The patient should continue with ASA and Brilinta for at  least 12 months.  Prepared and signed by  Corie Ga M.D.  Signed 12/06/2017 17:06:30    < from: CT Chest No Cont (01.20.18 @ 09:55) >  IMPRESSION:   Mild pulmonary edema with small simple bilateral pleural effusions, right   greater than left, with no evidence of loculation.    < end of copied text >    < from: TTE with Doppler (w/Cont) (01.23.18 @ 12:31) >  CONCLUSIONS:  1. Mitral annular calcification, otherwise normal mitral  valve. Mild-moderate mitral regurgitation.  2. Calcified trileaflet aortic valve with normal opening.  Mild aortic regurgitation.  3. Normal left ventricular internal dimensions and wall  thicknesses.  4. Endocardium not well visualized; grossly severe global  left ventricular systolic dysfunction.  Endocardial  visualization enhanced with intravenous injection of echo  contrast (Definity).  5. The right ventricle is not well visualized; grossly  normal right ventricular systolic function.  *** Compared with echocardiogram of 11/25/2017, no  significant changes noted.  ------------------------------------------------------------------------  Confirmed on  1/23/2018 - 14:35:49 by Jose Lucia M.D.    < end of copied text >      ASSESSMENT/PLAN: 	72y Female w/ PMH HTN, CKD, Hyperlipidemia, DM-II, CAD s/p 3V CABG  (LIMA to LAD, SVG to OM, SVG to PDA) at [facility] 2014, s/p CORNELIA TO RI 11/17/17, NSTEMI 12/2017 s/p C on 12/5 and CORNELIA to D Patricia, TTE at that time revealed grossly moderate to severe LV dysfunction with hypokinesis of the inferior, lateral and inferolateral with an EF of 36% admitted with acute on chronic systolic CHF     --C/W IV  Lasix.  Keep O>I monitor K & cr levels   --mildly elevated trops noted in setting of CKD & CHF exacerbation  - no need for urgent repeat cath at this time  --CT chest noted above  --Repeat TTE unchanged from prior.  EF 32%  --Cont. DAPT for recent CORNELIA  --eventual stress test when medically stable

## 2018-01-27 NOTE — PROGRESS NOTE ADULT - ASSESSMENT
1.	MERVIN, improved.  2.	CKD, at baseline.  3.	CHF, improved on Lasix 40 mg IVP daily. No edema.    PLAN:  1.	Consider PO Lasix now.  2.	Follow up BMP.  3.	Cardiology follow up.

## 2018-01-27 NOTE — PROGRESS NOTE ADULT - SUBJECTIVE AND OBJECTIVE BOX
Chief complaint  Patient is a 72y old  Female who presents with a chief complaint of SOB (25 Jan 2018 15:14)   Review of systems  Patient in bed, looks comfortable, no fever, no hypoglycemia.    Labs and Fingersticks  CAPILLARY BLOOD GLUCOSE      POCT Blood Glucose.: 368 mg/dL (27 Jan 2018 13:06)  POCT Blood Glucose.: 308 mg/dL (27 Jan 2018 08:42)  POCT Blood Glucose.: 360 mg/dL (26 Jan 2018 21:38)  POCT Blood Glucose.: 250 mg/dL (26 Jan 2018 17:56)          Calcium, Total Serum: 9.3 (01-27 @ 06:30)  Calcium, Total Serum: 8.7 (01-26 @ 05:20)          01-27    138  |  99  |  50<H>  ----------------------------<  226<H>  3.8   |  19<L>  |  1.60<H>    Ca    9.3      27 Jan 2018 06:30  Mg     2.2     01-27                          10.5   10.14 )-----------( 335      ( 27 Jan 2018 06:30 )             33.5     Medications  MEDICATIONS  (STANDING):  aspirin enteric coated 81 milliGRAM(s) Oral daily  atorvastatin 40 milliGRAM(s) Oral at bedtime  dextrose 5%. 1000 milliLiter(s) (50 mL/Hr) IV Continuous <Continuous>  dextrose 50% Injectable 12.5 Gram(s) IV Push once  dextrose 50% Injectable 25 Gram(s) IV Push once  dextrose 50% Injectable 25 Gram(s) IV Push once  famotidine    Tablet 20 milliGRAM(s) Oral daily  furosemide   Injectable 40 milliGRAM(s) IV Push daily  heparin  Injectable 5000 Unit(s) SubCutaneous every 12 hours  insulin glargine Injectable (LANTUS) 20 Unit(s) SubCutaneous every morning  insulin glargine Injectable (LANTUS) 68 Unit(s) SubCutaneous at bedtime  insulin lispro (HumaLOG) corrective regimen sliding scale   SubCutaneous three times a day before meals  insulin lispro (HumaLOG) corrective regimen sliding scale   SubCutaneous at bedtime  insulin lispro Injectable (HumaLOG) 24 Unit(s) SubCutaneous three times a day before meals  levothyroxine 50 MICROGram(s) Oral daily  metoprolol succinate ER 12.5 milliGRAM(s) Oral daily  montelukast 10 milliGRAM(s) Oral at bedtime  sodium bicarbonate 650 milliGRAM(s) Oral three times a day  sodium chloride 0.9% lock flush 3 milliLiter(s) IV Push every 8 hours  ticagrelor 90 milliGRAM(s) Oral two times a day      Physical Exam  General: Patient comfortable in bed  Vital Signs Last 12 Hrs  T(F): 97.9 (01-27-18 @ 13:51), Max: 97.9 (01-27-18 @ 13:51)  HR: 87 (01-27-18 @ 13:51) (78 - 87)  BP: 117/68 (01-27-18 @ 13:51) (117/68 - 137/60)  BP(mean): --  RR: 16 (01-27-18 @ 13:51) (16 - 16)  SpO2: 100% (01-27-18 @ 13:51) (98% - 100%)  Neck: No palpable thyroid nodules.  CVS: S1S2, No murmurs  Respiratory: No wheezing, no crepitations  GI: Abdomen soft, bowel sounds positive  Musculoskeletal:  edema lower extremities.   Skin: No skin rashes, no ecchymosis    Diagnostics

## 2018-01-27 NOTE — PROGRESS NOTE ADULT - SUBJECTIVE AND OBJECTIVE BOX
ALETHEAHANNAH PARKERIMA  HPI:  This is a patient who was admitted for CHF decompensation. She feels better, ambulating without difficulty.  HEALTH ISSUES - PROBLEM Dx:  Acidosis: Acidosis  Anemia: Anemia  Stage 3 chronic kidney disease: Stage 3 chronic kidney disease  Need for prophylactic measure: Need for prophylactic measure  GERD (gastroesophageal reflux disease): GERD (gastroesophageal reflux disease)  Hypothyroidism: Hypothyroidism  Hyperlipidemia: Hyperlipidemia  Diabetes mellitus, type 2: Diabetes mellitus, type 2  CHF exacerbation: CHF exacerbation        MEDICATIONS  (STANDING):  aspirin enteric coated 81 milliGRAM(s) Oral daily  atorvastatin 40 milliGRAM(s) Oral at bedtime  dextrose 5%. 1000 milliLiter(s) (50 mL/Hr) IV Continuous <Continuous>  dextrose 50% Injectable 12.5 Gram(s) IV Push once  dextrose 50% Injectable 25 Gram(s) IV Push once  dextrose 50% Injectable 25 Gram(s) IV Push once  famotidine    Tablet 20 milliGRAM(s) Oral daily  furosemide   Injectable 40 milliGRAM(s) IV Push daily  heparin  Injectable 5000 Unit(s) SubCutaneous every 12 hours  insulin glargine Injectable (LANTUS) 20 Unit(s) SubCutaneous every morning  insulin glargine Injectable (LANTUS) 68 Unit(s) SubCutaneous at bedtime  insulin lispro (HumaLOG) corrective regimen sliding scale   SubCutaneous three times a day before meals  insulin lispro (HumaLOG) corrective regimen sliding scale   SubCutaneous at bedtime  insulin lispro Injectable (HumaLOG) 24 Unit(s) SubCutaneous three times a day before meals  levothyroxine 50 MICROGram(s) Oral daily  metoprolol succinate ER 12.5 milliGRAM(s) Oral daily  montelukast 10 milliGRAM(s) Oral at bedtime  sodium bicarbonate 650 milliGRAM(s) Oral three times a day  sodium chloride 0.9% lock flush 3 milliLiter(s) IV Push every 8 hours  ticagrelor 90 milliGRAM(s) Oral two times a day    MEDICATIONS  (PRN):  dextrose Gel 1 Dose(s) Oral once PRN Blood Glucose LESS THAN 70 milliGRAM(s)/deciliter  glucagon  Injectable 1 milliGRAM(s) IntraMuscular once PRN Glucose LESS THAN 70 milligrams/deciliter    Vital Signs Last 24 Hrs  T(C): 36.6 (2018 13:51), Max: 36.7 (2018 20:57)  T(F): 97.9 (2018 13:51), Max: 98 (2018 20:57)  HR: 89 (2018 17:48) (78 - 89)  BP: 107/48 (2018 17:48) (107/48 - 137/60)  BP(mean): --  RR: 16 (2018 13:51) (16 - 17)  SpO2: 100% (2018 13:51) (98% - 100%)  Daily     Daily Weight in k.2 (2018 05:31)    PHYSICAL EXAM:  Constitutional:  She appears comfortable and not distressed. Not diaphoretic.    Neck:  The thyroid is normal. Trachea is midline.     Breasts: Normal examination.    Respiratory: The lungs are clear to auscultation. No dullness and expansion is normal.    Cardiovascular: S1 and S2 are normal. No mummurs, rubs or gallops are present.    Gastrointestinal: The abdomen is soft. No tenderness is present. No masses are present. Bowel sounds are normal.    Genitourinary: The bladder is not distended. No CVA tenderness is present.    Extremities: No edema is noted. No deformities are present.    Neurological: Cognition is normal. Tone, power and sensation are normal. Gait is steady.    Skin: No leasions are seen  or palpated.    Lymph Nodes: No lymphadenopathy is present.    Psychiatric: Mood is appropriate. No hallucinations or flight of ideas are noted.                              10.5   1014 )-----------( 335      ( 2018 06:30 )             33.5         138  |  99  |  50<H>  ----------------------------<  226<H>  3.8   |  19<L>  |  1.60<H>    Ca    9.3      2018 06:30  Mg     2.2

## 2018-01-28 LAB
BUN SERPL-MCNC: 44 MG/DL — HIGH (ref 7–23)
CALCIUM SERPL-MCNC: 9.3 MG/DL — SIGNIFICANT CHANGE UP (ref 8.4–10.5)
CHLORIDE SERPL-SCNC: 100 MMOL/L — SIGNIFICANT CHANGE UP (ref 98–107)
CO2 SERPL-SCNC: 21 MMOL/L — LOW (ref 22–31)
CREAT SERPL-MCNC: 1.58 MG/DL — HIGH (ref 0.5–1.3)
GLUCOSE BLDC GLUCOMTR-MCNC: 220 MG/DL — HIGH (ref 70–99)
GLUCOSE BLDC GLUCOMTR-MCNC: 235 MG/DL — HIGH (ref 70–99)
GLUCOSE BLDC GLUCOMTR-MCNC: 252 MG/DL — HIGH (ref 70–99)
GLUCOSE BLDC GLUCOMTR-MCNC: 286 MG/DL — HIGH (ref 70–99)
GLUCOSE SERPL-MCNC: 211 MG/DL — HIGH (ref 70–99)
HCT VFR BLD CALC: 33.6 % — LOW (ref 34.5–45)
HGB BLD-MCNC: 11 G/DL — LOW (ref 11.5–15.5)
MAGNESIUM SERPL-MCNC: 2.2 MG/DL — SIGNIFICANT CHANGE UP (ref 1.6–2.6)
MCHC RBC-ENTMCNC: 27.5 PG — SIGNIFICANT CHANGE UP (ref 27–34)
MCHC RBC-ENTMCNC: 32.7 % — SIGNIFICANT CHANGE UP (ref 32–36)
MCV RBC AUTO: 84 FL — SIGNIFICANT CHANGE UP (ref 80–100)
NRBC # FLD: 0 — SIGNIFICANT CHANGE UP
PLATELET # BLD AUTO: 333 K/UL — SIGNIFICANT CHANGE UP (ref 150–400)
PMV BLD: 9.6 FL — SIGNIFICANT CHANGE UP (ref 7–13)
POTASSIUM SERPL-MCNC: 3.8 MMOL/L — SIGNIFICANT CHANGE UP (ref 3.5–5.3)
POTASSIUM SERPL-SCNC: 3.8 MMOL/L — SIGNIFICANT CHANGE UP (ref 3.5–5.3)
RBC # BLD: 4 M/UL — SIGNIFICANT CHANGE UP (ref 3.8–5.2)
RBC # FLD: 14.6 % — HIGH (ref 10.3–14.5)
SODIUM SERPL-SCNC: 140 MMOL/L — SIGNIFICANT CHANGE UP (ref 135–145)
WBC # BLD: 11.52 K/UL — HIGH (ref 3.8–10.5)
WBC # FLD AUTO: 11.52 K/UL — HIGH (ref 3.8–10.5)

## 2018-01-28 PROCEDURE — 93018 CV STRESS TEST I&R ONLY: CPT | Mod: GC

## 2018-01-28 PROCEDURE — 78452 HT MUSCLE IMAGE SPECT MULT: CPT | Mod: 26

## 2018-01-28 PROCEDURE — 93016 CV STRESS TEST SUPVJ ONLY: CPT | Mod: GC

## 2018-01-28 RX ADMIN — SODIUM CHLORIDE 3 MILLILITER(S): 9 INJECTION INTRAMUSCULAR; INTRAVENOUS; SUBCUTANEOUS at 13:58

## 2018-01-28 RX ADMIN — MONTELUKAST 10 MILLIGRAM(S): 4 TABLET, CHEWABLE ORAL at 21:43

## 2018-01-28 RX ADMIN — Medication 2: at 21:50

## 2018-01-28 RX ADMIN — INSULIN GLARGINE 68 UNIT(S): 100 INJECTION, SOLUTION SUBCUTANEOUS at 21:43

## 2018-01-28 RX ADMIN — TICAGRELOR 90 MILLIGRAM(S): 90 TABLET ORAL at 06:02

## 2018-01-28 RX ADMIN — Medication 650 MILLIGRAM(S): at 06:02

## 2018-01-28 RX ADMIN — Medication 4: at 17:46

## 2018-01-28 RX ADMIN — SODIUM CHLORIDE 3 MILLILITER(S): 9 INJECTION INTRAMUSCULAR; INTRAVENOUS; SUBCUTANEOUS at 00:47

## 2018-01-28 RX ADMIN — SODIUM CHLORIDE 3 MILLILITER(S): 9 INJECTION INTRAMUSCULAR; INTRAVENOUS; SUBCUTANEOUS at 06:03

## 2018-01-28 RX ADMIN — Medication 4: at 08:53

## 2018-01-28 RX ADMIN — Medication 24 UNIT(S): at 08:53

## 2018-01-28 RX ADMIN — Medication 650 MILLIGRAM(S): at 13:59

## 2018-01-28 RX ADMIN — Medication 6: at 14:07

## 2018-01-28 RX ADMIN — HEPARIN SODIUM 5000 UNIT(S): 5000 INJECTION INTRAVENOUS; SUBCUTANEOUS at 17:47

## 2018-01-28 RX ADMIN — TICAGRELOR 90 MILLIGRAM(S): 90 TABLET ORAL at 17:48

## 2018-01-28 RX ADMIN — Medication 650 MILLIGRAM(S): at 21:44

## 2018-01-28 RX ADMIN — Medication 81 MILLIGRAM(S): at 13:59

## 2018-01-28 RX ADMIN — ATORVASTATIN CALCIUM 40 MILLIGRAM(S): 80 TABLET, FILM COATED ORAL at 21:41

## 2018-01-28 RX ADMIN — FAMOTIDINE 20 MILLIGRAM(S): 10 INJECTION INTRAVENOUS at 13:58

## 2018-01-28 RX ADMIN — INSULIN GLARGINE 20 UNIT(S): 100 INJECTION, SOLUTION SUBCUTANEOUS at 08:54

## 2018-01-28 RX ADMIN — Medication 24 UNIT(S): at 14:08

## 2018-01-28 RX ADMIN — Medication 40 MILLIGRAM(S): at 06:02

## 2018-01-28 RX ADMIN — SODIUM CHLORIDE 3 MILLILITER(S): 9 INJECTION INTRAMUSCULAR; INTRAVENOUS; SUBCUTANEOUS at 21:41

## 2018-01-28 RX ADMIN — Medication 24 UNIT(S): at 17:47

## 2018-01-28 RX ADMIN — Medication 50 MICROGRAM(S): at 06:02

## 2018-01-28 NOTE — PROGRESS NOTE ADULT - ASSESSMENT
71 Female, with a PmHx of HTN, CKD, Hyperlipidemia, DM-II, CAD s/p CABG x 3, s/p stents, presenting to the [facility] ED c/o shortness of breath. Pt is being admitted to telemetry for acute on chronic systolic heart failure.    Problem/Plan - 1:  ·  Problem: CHF exacerbation.  Plan: telemonitor   strict I & O's, daily wts  Fluid restriction  pt on furosemide  Repeat TTE unchanged EF32%  no need for cath as per cardiology    Problem/Plan - 2:  ·  Problem: Diabetes mellitus, type 2.  Plan: monitor fs   basal/bolus     Problem/Plan - 3:  ·  Problem: Hyperlipidemia.  Plan: cw statin    Problem/Plan - 4:  ·  Problem: Hypothyroidism.  Plan: cw levothyroxine.

## 2018-01-28 NOTE — PROGRESS NOTE ADULT - SUBJECTIVE AND OBJECTIVE BOX
Subjective:  no chest pain SOB improved         family at bedside   	     MEDICATIONS  (STANDING):  aspirin enteric coated 81 milliGRAM(s) Oral daily  atorvastatin 40 milliGRAM(s) Oral at bedtime  dextrose 5%. 1000 milliLiter(s) (50 mL/Hr) IV Continuous <Continuous>  dextrose 50% Injectable 12.5 Gram(s) IV Push once  dextrose 50% Injectable 25 Gram(s) IV Push once  dextrose 50% Injectable 25 Gram(s) IV Push once  famotidine    Tablet 20 milliGRAM(s) Oral daily  furosemide   Injectable 40 milliGRAM(s) IV Push daily  heparin  Injectable 5000 Unit(s) SubCutaneous every 12 hours  insulin glargine Injectable (LANTUS) 20 Unit(s) SubCutaneous every morning  insulin glargine Injectable (LANTUS) 68 Unit(s) SubCutaneous at bedtime  insulin lispro (HumaLOG) corrective regimen sliding scale   SubCutaneous three times a day before meals  insulin lispro (HumaLOG) corrective regimen sliding scale   SubCutaneous at bedtime  insulin lispro Injectable (HumaLOG) 24 Unit(s) SubCutaneous three times a day before meals  levothyroxine 50 MICROGram(s) Oral daily  metoprolol succinate ER 12.5 milliGRAM(s) Oral daily  montelukast 10 milliGRAM(s) Oral at bedtime  sodium bicarbonate 650 milliGRAM(s) Oral three times a day  sodium chloride 0.9% lock flush 3 milliLiter(s) IV Push every 8 hours  ticagrelor 90 milliGRAM(s) Oral two times a day    MEDICATIONS  (PRN):  dextrose Gel 1 Dose(s) Oral once PRN Blood Glucose LESS THAN 70 milliGRAM(s)/deciliter  glucagon  Injectable 1 milliGRAM(s) IntraMuscular once PRN Glucose LESS THAN 70 milligrams/deciliter      LABS:                        11.0   11.52 )-----------( 333      ( 28 Jan 2018 05:15 )             33.6     Hemoglobin: 11.0 g/dL (01-28 @ 05:15)  Hemoglobin: 10.5 g/dL (01-27 @ 06:30)  Hemoglobin: 9.9 g/dL (01-26 @ 05:20)  Hemoglobin: 10.1 g/dL (01-25 @ 06:30)  Hemoglobin: 10.0 g/dL (01-24 @ 05:10)    01-28    140  |  100  |  44<H>  ----------------------------<  211<H>  3.8   |  21<L>  |  1.58<H>    Ca    9.3      28 Jan 2018 05:15  Mg     2.2     01-28      Creatinine Trend: 1.58<--, 1.60<--, 1.77<--, 1.75<--, 2.18<--, 2.31<--           PHYSICAL EXAM  Vital Signs Last 24 Hrs  T(C): 36.5 (28 Jan 2018 06:05), Max: 36.7 (27 Jan 2018 21:18)  T(F): 97.7 (28 Jan 2018 06:05), Max: 98 (27 Jan 2018 21:18)  HR: 78 (28 Jan 2018 06:05) (78 - 89)  BP: 123/67 (28 Jan 2018 06:05) (107/48 - 123/67)  BP(mean): --  RR: 16 (28 Jan 2018 06:05) (16 - 17)  SpO2: 98% (28 Jan 2018 06:05) (98% - 100%)        Cardiovascular: S1S2 RRR  Respiratory: CTA BL with decreased breath sounds B/L LLs  Gastrointestinal:  Soft, Non-tender, + BS	  Extremities No edema, cyanosis or clubbing  B/L LE's     DIAGNOSTIC DATA:     TELEMETRY: SR 	       < from: Cardiac Cath Lab - Adult (10.13.17 @ 10:40) >  VENTRICLES: No left ventriculogram was performed.  CORONARY VESSELS: The coronary circulation is right dominant.  LM:   --  LM: Normal.  LAD:   --  Proximal LAD: There was a diffuse 60 % stenosis.  --  Mid LAD: There was a 100 % stenosis with the distal vessel being filled  via LIMA-LAD.  CX:   --  Circumflex: The vessel was small sized. Angiography showed mild  atherosclerosis withno flow limiting lesions.  --  OM1: Angiography showed mild atherosclerosis with no flow limiting  lesions.  RI:   --  Ostial ramus intermedius: There was a tubular 80 % stenosis.  There was PARUL grade 3 flow through the vessel (brisk flow).  RCA:   --  Proximal RCA: Angiography showed mild atherosclerosis with no  flow limiting lesions.  --  Mid RCA: There was a 100 % stenosis with the distal vessel being filled  via collaterals. This lesion is a chronic total occlusion.  GRAFTS:   --  Graft to the LAD: The graft was a LIMA. Graft angiography  showed minor luminal irregularities. Distal vessel angiography showed  minor luminal irregularities.  AORTA: Ascending aorta: Normal. No additional grafts visualized in  aortogram.  COMPLICATIONS: There were no complications.  DIAGNOSTIC RECOMMENDATIONS: Coronary angiogram demonstrates patent  LIMA-LAD, closed SVG grafts, and a severe stenosis in the ostial Ramus.  Will perform staged PCI to RI when Cr stable and renally optimized.  Prepared and signed by  Corie Ga M.D.  Signed 10/17/2017 13:49:15    < end of copied text >    < from: Cardiac Cath Lab - Adult (11.17.17 @ 11:48) >  PROCEDURE:  --  Intervention on ramus intermedius: drug-eluting stent.    VENTRICLES: No LV gram was performed; however, a recent echocardiogram  demonstrated normal global and regional LV function.  CORONARY VESSELS: The coronary circulation is right dominant.  LM:   --  LM: Angiography showed minor luminal irregularities with no flow  limiting lesions.  LAD:   --  Ostial LAD: There was a 100 % stenosis.  CX:   --  Circumflex: This vessel was not injected, but was visualized  during a prior cardiac catheterization.  RI:   --  Ramus intermedius: There was a 95 % stenosis.  RCA:   --  RCA: This vessel was not injected.  COMPLICATIONS: There were no complications.  DIAGNOSTIC RECOMMENDATIONS: The patient should continue with the present  medications. will add plavix 75mg po once a day/ will add ECASA 325mg po  once day.  Prepared and signed by  Roberta Quick M.D.  Signed 11/17/2017 13:16:01    < end of copied text >    < from: TTE with Doppler (w/Cont) (11.25.17 @ 12:33) >  CONCLUSIONS:  1. Mitral annular calcification, otherwise normal mitral  valve. Mild mitral regurgitation.  2. Normal left ventricular internal dimensions and wall  thicknesses.  3. Endocardium not well visualized; grossly moderate to  severe segmental left ventricularsystolic dysfunction.  Hypokinesis of the inferior, lateral, and inferolateral  walls.  Endocardial visualization enhanced with intravenous  injection of echo contrast (Definity).  No LV thrombus  seen.  4. The right ventricle is not well visualized;grossly  normal right ventricular systolic function.  ------------------------------------------------------------------------  Confirmed on  11/25/2017 - 14:44:41 by Jose Lucia M.D.    < end of copied text >    < from: Cardiac Cath Lab - Adult (12.05.17 @ 12:48) >  VENTRICLES: No LV gram was performed; however, a recent echocardiogram  demonstrated an EF of 36 %.  CORONARY VESSELS: The coronary circulation is right dominant.  LM:   --  Distal left main: Angiography showed minor luminal irregularities  with no flow limiting lesions.  LAD:   --  Mid LAD: There was a 100 % stenosis with the distal vessel being  filled via LIMA-LAD.  CX:   --  Proximal circumflex: There was a tubular 20 % stenosis.  --  Mid circumflex: Angiography showed minor luminal irregularities with no  flow limiting lesions.  --  Distal circumflex: Angiography showed minor luminal irregularities with  no flow limiting lesions.  --  OM1: The vessel was small sized. There was a discrete 60 % stenosis.  RI:   --  Ostial ramus intermedius: Angiography showed minor luminal  irregularities with no flow limiting lesions. There was no significant  restenosis in RI stent.  --  Proximal ramus intermedius: Angiography showed minor luminal  irregularities with no flow limiting lesions.  --  Mid ramus intermedius: There was a tubular 80 % stenosis. The lesion  was associated with a small filling defect consistent with thrombus.  RCA:   --  Mid RCA: There was a 100 % stenosis with the distal vessel being  filled via collaterals from the LAD.  GRAFTS:   --  Graft to the LAD: The graft was a LIMA. Graft angiography  showed minor luminal irregularities. Distal vessel angiography showed  minor luminalirregularities.  COMPLICATIONS: There were no complications.  DIAGNOSTIC RECOMMENDATIONS: Coronary angiogram demonstrates a severe  stenosis in the ramus intermedius that is the cause of the patient's  clinical presentation. Will therefore perform PCI to the RI.  INTERVENTIONAL RECOMMENDATIONS: S/p successful CORNELIA to the Ramus  Intermedius. The patient should continue with ASA and Brilinta for at  least 12 months.  Prepared and signed by  Corie Ga M.D.  Signed 12/06/2017 17:06:30    < from: CT Chest No Cont (01.20.18 @ 09:55) >  IMPRESSION:   Mild pulmonary edema with small simple bilateral pleural effusions, right   greater than left, with no evidence of loculation.    < end of copied text >    < from: TTE with Doppler (w/Cont) (01.23.18 @ 12:31) >  CONCLUSIONS:  1. Mitral annular calcification, otherwise normal mitral  valve. Mild-moderate mitral regurgitation.  2. Calcified trileaflet aortic valve with normal opening.  Mild aortic regurgitation.  3. Normal left ventricular internal dimensions and wall  thicknesses.  4. Endocardium not well visualized; grossly severe global  left ventricular systolic dysfunction.  Endocardial  visualization enhanced with intravenous injection of echo  contrast (Definity).  5. The right ventricle is not well visualized; grossly  normal right ventricular systolic function.  *** Compared with echocardiogram of 11/25/2017, no  significant changes noted.  ------------------------------------------------------------------------  Confirmed on  1/23/2018 - 14:35:49 by Jose Lucia M.D.    < end of copied text >      ASSESSMENT/PLAN: 	72y Female w/ PMH HTN, CKD, Hyperlipidemia, DM-II, CAD s/p 3V CABG  (LIMA to LAD, SVG to OM, SVG to PDA) at [facility] 2014, s/p CORNELIA TO RI 11/17/17, NSTEMI 12/2017 s/p C on 12/5 and CORNELIA to [facility], TTE at that time revealed grossly moderate to severe LV dysfunction with hypokinesis of the inferior, lateral and inferolateral with an EF of 36% admitted with acute on chronic systolic CHF     --C/W IV  Lasix.  Keep O>I monitor K & cr levels   --mildly elevated trops noted in setting of CKD & CHF exacerbation  - no need for urgent repeat cath at this time  --CT chest noted above  --Repeat TTE unchanged from prior.  EF 32%  --Cont. DAPT for recent CORNELIA  -- stress test pending   --f/u renal recommendations   --f/u Endo recommendations

## 2018-01-28 NOTE — PROGRESS NOTE ADULT - SUBJECTIVE AND OBJECTIVE BOX
covering for DR scotty friend no cp  T(C): 36.5 (28 Jan 2018 06:05), Max: 36.7 (27 Jan 2018 21:18)  T(F): 97.7 (28 Jan 2018 06:05), Max: 98 (27 Jan 2018 21:18)  HR: 78 (28 Jan 2018 06:05) (78 - 89)  BP: 123/67 (28 Jan 2018 06:05) (107/48 - 123/67)  BP(mean): --  ABP: --  ABP(mean): --  RR: 16 (28 Jan 2018 06:05) (16 - 17)  SpO2: 98% (28 Jan 2018 06:05) (98% - 100%)  01-28    140  |  100  |  44<H>  ----------------------------<  211<H>  3.8   |  21<L>  |  1.58<H>    Ca    9.3      28 Jan 2018 05:15  Mg     2.2     01-28                        11.0   11.52 )-----------( 333      ( 28 Jan 2018 05:15 )             33.6

## 2018-01-28 NOTE — PROGRESS NOTE ADULT - SUBJECTIVE AND OBJECTIVE BOX
SELVIN CLAIRE  HPI:  This is a patient with CKD, CAD and CHF. She was admitted with dyspnea, started on Po then IV Furosemide. She feels better, no cough or dyspnea,    HEALTH ISSUES - PROBLEM Dx:  Acidosis: Acidosis  Anemia: Anemia  Stage 3 chronic kidney disease: Stage 3 chronic kidney disease  Need for prophylactic measure: Need for prophylactic measure  GERD (gastroesophageal reflux disease): GERD (gastroesophageal reflux disease)  Hypothyroidism: Hypothyroidism  Hyperlipidemia: Hyperlipidemia  Diabetes mellitus, type 2: Diabetes mellitus, type 2  CHF exacerbation: CHF exacerbation        MEDICATIONS  (STANDING):  aspirin enteric coated 81 milliGRAM(s) Oral daily  atorvastatin 40 milliGRAM(s) Oral at bedtime  dextrose 5%. 1000 milliLiter(s) (50 mL/Hr) IV Continuous <Continuous>  dextrose 50% Injectable 12.5 Gram(s) IV Push once  dextrose 50% Injectable 25 Gram(s) IV Push once  dextrose 50% Injectable 25 Gram(s) IV Push once  famotidine    Tablet 20 milliGRAM(s) Oral daily  furosemide   Injectable 40 milliGRAM(s) IV Push daily  heparin  Injectable 5000 Unit(s) SubCutaneous every 12 hours  insulin glargine Injectable (LANTUS) 20 Unit(s) SubCutaneous every morning  insulin glargine Injectable (LANTUS) 68 Unit(s) SubCutaneous at bedtime  insulin lispro (HumaLOG) corrective regimen sliding scale   SubCutaneous three times a day before meals  insulin lispro (HumaLOG) corrective regimen sliding scale   SubCutaneous at bedtime  insulin lispro Injectable (HumaLOG) 24 Unit(s) SubCutaneous three times a day before meals  levothyroxine 50 MICROGram(s) Oral daily  metoprolol succinate ER 12.5 milliGRAM(s) Oral daily  montelukast 10 milliGRAM(s) Oral at bedtime  sodium bicarbonate 650 milliGRAM(s) Oral three times a day  sodium chloride 0.9% lock flush 3 milliLiter(s) IV Push every 8 hours  ticagrelor 90 milliGRAM(s) Oral two times a day    MEDICATIONS  (PRN):  dextrose Gel 1 Dose(s) Oral once PRN Blood Glucose LESS THAN 70 milliGRAM(s)/deciliter  glucagon  Injectable 1 milliGRAM(s) IntraMuscular once PRN Glucose LESS THAN 70 milligrams/deciliter    Vital Signs Last 24 Hrs  T(C): 36.5 (2018 06:05), Max: 36.7 (2018 21:18)  T(F): 97.7 (2018 06:05), Max: 98 (2018 21:18)  HR: 78 (2018 06:05) (78 - 89)  BP: 123/67 (2018 06:05) (107/48 - 123/67)  BP(mean): --  RR: 16 (2018 06:05) (16 - 17)  SpO2: 98% (2018 06:05) (98% - 100%)  Daily     Daily Weight in k.9 (2018 06:05)    PHYSICAL EXAM:  Constitutional: She  appears comfortable and not distressed. Not diaphoretic.    Neck:  The thyroid is normal. Trachea is midline.     Respiratory: The lungs are clear to auscultation. No dullness and expansion is normal.    Cardiovascular: S1 and S2 are normal. No mummurs, rubs or gallops are present.    Gastrointestinal: The abdomen is soft. No tenderness is present. No masses are present. Bowel sounds are normal.    Genitourinary: The bladder is not distended. No CVA tenderness is present.    Extremities: No edema is noted. No deformities are present.    Neurological: Cognition is normal. Tone, power and sensation are normal. Gait is steady.    Skin: No leasions are seen  or palpated.    Lymph Nodes: No lymphadenopathy is present.    Psychiatric: Mood is appropriate. No hallucinations or flight of ideas are noted.                              11.0   11.52 )-----------( 333      ( 2018 05:15 )             33.6         140  |  100  |  44<H>  ----------------------------<  211<H>  3.8   |  21<L>  |  1.58<H>    Ca    9.3      2018 05:15  Mg     2.2

## 2018-01-28 NOTE — PROGRESS NOTE ADULT - ASSESSMENT
1.	CHF, improving. She is on IV Furosemide.  2.	CKD, stable.  3.	MERVIN, improved.  4.	Metabolic Acidosis.    PLAN:  1.	Continue diuretics as per cardiology.  2.	Follow up BMP.  3.	Monitor K and Mg, supplementing as needed.

## 2018-01-28 NOTE — PROGRESS NOTE ADULT - SUBJECTIVE AND OBJECTIVE BOX
Chief complaint  Patient is a 72y old  Female who presents with a chief complaint of SOB (25 Jan 2018 15:14)   Review of systems  Patient in bed, looks comfortable, no fever,  no hypoglycemia.    Labs and Fingersticks  CAPILLARY BLOOD GLUCOSE      POCT Blood Glucose.: 220 mg/dL (28 Jan 2018 17:40)  POCT Blood Glucose.: 252 mg/dL (28 Jan 2018 14:05)  POCT Blood Glucose.: 235 mg/dL (28 Jan 2018 08:39)  POCT Blood Glucose.: 318 mg/dL (27 Jan 2018 23:03)          Calcium, Total Serum: 9.3 (01-28 @ 05:15)  Calcium, Total Serum: 9.3 (01-27 @ 06:30)          01-28    140  |  100  |  44<H>  ----------------------------<  211<H>  3.8   |  21<L>  |  1.58<H>    Ca    9.3      28 Jan 2018 05:15  Mg     2.2     01-28                          11.0   11.52 )-----------( 333      ( 28 Jan 2018 05:15 )             33.6     Medications  MEDICATIONS  (STANDING):  aspirin enteric coated 81 milliGRAM(s) Oral daily  atorvastatin 40 milliGRAM(s) Oral at bedtime  dextrose 5%. 1000 milliLiter(s) (50 mL/Hr) IV Continuous <Continuous>  dextrose 50% Injectable 12.5 Gram(s) IV Push once  dextrose 50% Injectable 25 Gram(s) IV Push once  dextrose 50% Injectable 25 Gram(s) IV Push once  famotidine    Tablet 20 milliGRAM(s) Oral daily  furosemide   Injectable 40 milliGRAM(s) IV Push daily  heparin  Injectable 5000 Unit(s) SubCutaneous every 12 hours  insulin glargine Injectable (LANTUS) 20 Unit(s) SubCutaneous every morning  insulin glargine Injectable (LANTUS) 68 Unit(s) SubCutaneous at bedtime  insulin lispro (HumaLOG) corrective regimen sliding scale   SubCutaneous three times a day before meals  insulin lispro (HumaLOG) corrective regimen sliding scale   SubCutaneous at bedtime  insulin lispro Injectable (HumaLOG) 24 Unit(s) SubCutaneous three times a day before meals  levothyroxine 50 MICROGram(s) Oral daily  metoprolol succinate ER 12.5 milliGRAM(s) Oral daily  montelukast 10 milliGRAM(s) Oral at bedtime  sodium bicarbonate 650 milliGRAM(s) Oral three times a day  sodium chloride 0.9% lock flush 3 milliLiter(s) IV Push every 8 hours  ticagrelor 90 milliGRAM(s) Oral two times a day      Physical Exam  General: Patient comfortable in bed  Vital Signs Last 12 Hrs  T(F): 97.3 (01-28-18 @ 14:10), Max: 97.3 (01-28-18 @ 14:10)  HR: 93 (01-28-18 @ 14:10) (93 - 93)  BP: 133/88 (01-28-18 @ 14:10) (133/88 - 133/88)  BP(mean): --  RR: 18 (01-28-18 @ 14:10) (18 - 18)  SpO2: 100% (01-28-18 @ 14:10) (100% - 100%)  Neck: No palpable thyroid nodules.  CVS: S1S2, No murmurs  Respiratory: No wheezing, no crepitations  GI: Abdomen soft, bowel sounds positive  Musculoskeletal:  edema lower extremities.   Skin: No skin rashes, no ecchymosis    Diagnostics

## 2018-01-29 LAB
BUN SERPL-MCNC: 37 MG/DL — HIGH (ref 7–23)
CALCIUM SERPL-MCNC: 9 MG/DL — SIGNIFICANT CHANGE UP (ref 8.4–10.5)
CHLORIDE SERPL-SCNC: 103 MMOL/L — SIGNIFICANT CHANGE UP (ref 98–107)
CO2 SERPL-SCNC: 25 MMOL/L — SIGNIFICANT CHANGE UP (ref 22–31)
CREAT SERPL-MCNC: 1.49 MG/DL — HIGH (ref 0.5–1.3)
GLUCOSE BLDC GLUCOMTR-MCNC: 169 MG/DL — HIGH (ref 70–99)
GLUCOSE BLDC GLUCOMTR-MCNC: 234 MG/DL — HIGH (ref 70–99)
GLUCOSE BLDC GLUCOMTR-MCNC: 317 MG/DL — HIGH (ref 70–99)
GLUCOSE BLDC GLUCOMTR-MCNC: 407 MG/DL — HIGH (ref 70–99)
GLUCOSE BLDC GLUCOMTR-MCNC: 468 MG/DL — CRITICAL HIGH (ref 70–99)
GLUCOSE SERPL-MCNC: 127 MG/DL — HIGH (ref 70–99)
HCT VFR BLD CALC: 32.6 % — LOW (ref 34.5–45)
HGB BLD-MCNC: 10.6 G/DL — LOW (ref 11.5–15.5)
MAGNESIUM SERPL-MCNC: 2.1 MG/DL — SIGNIFICANT CHANGE UP (ref 1.6–2.6)
MCHC RBC-ENTMCNC: 27.2 PG — SIGNIFICANT CHANGE UP (ref 27–34)
MCHC RBC-ENTMCNC: 32.5 % — SIGNIFICANT CHANGE UP (ref 32–36)
MCV RBC AUTO: 83.8 FL — SIGNIFICANT CHANGE UP (ref 80–100)
NRBC # FLD: 0 — SIGNIFICANT CHANGE UP
PLATELET # BLD AUTO: 314 K/UL — SIGNIFICANT CHANGE UP (ref 150–400)
PMV BLD: 9.5 FL — SIGNIFICANT CHANGE UP (ref 7–13)
POTASSIUM SERPL-MCNC: 3.4 MMOL/L — LOW (ref 3.5–5.3)
POTASSIUM SERPL-SCNC: 3.4 MMOL/L — LOW (ref 3.5–5.3)
RBC # BLD: 3.89 M/UL — SIGNIFICANT CHANGE UP (ref 3.8–5.2)
RBC # FLD: 14.4 % — SIGNIFICANT CHANGE UP (ref 10.3–14.5)
SODIUM SERPL-SCNC: 144 MMOL/L — SIGNIFICANT CHANGE UP (ref 135–145)
WBC # BLD: 10.34 K/UL — SIGNIFICANT CHANGE UP (ref 3.8–10.5)
WBC # FLD AUTO: 10.34 K/UL — SIGNIFICANT CHANGE UP (ref 3.8–10.5)

## 2018-01-29 RX ORDER — SODIUM CHLORIDE 0.65 %
1 AEROSOL, SPRAY (ML) NASAL THREE TIMES A DAY
Qty: 0 | Refills: 0 | Status: DISCONTINUED | OUTPATIENT
Start: 2018-01-29 | End: 2018-02-09

## 2018-01-29 RX ORDER — ACETAMINOPHEN 500 MG
650 TABLET ORAL EVERY 6 HOURS
Qty: 0 | Refills: 0 | Status: DISCONTINUED | OUTPATIENT
Start: 2018-01-29 | End: 2018-02-09

## 2018-01-29 RX ORDER — POTASSIUM CHLORIDE 20 MEQ
20 PACKET (EA) ORAL ONCE
Qty: 0 | Refills: 0 | Status: COMPLETED | OUTPATIENT
Start: 2018-01-29 | End: 2018-01-29

## 2018-01-29 RX ADMIN — Medication 12: at 13:12

## 2018-01-29 RX ADMIN — Medication 650 MILLIGRAM(S): at 10:00

## 2018-01-29 RX ADMIN — Medication 24 UNIT(S): at 09:08

## 2018-01-29 RX ADMIN — Medication 650 MILLIGRAM(S): at 05:54

## 2018-01-29 RX ADMIN — HEPARIN SODIUM 5000 UNIT(S): 5000 INJECTION INTRAVENOUS; SUBCUTANEOUS at 05:53

## 2018-01-29 RX ADMIN — Medication 4: at 18:24

## 2018-01-29 RX ADMIN — Medication 40 MILLIGRAM(S): at 05:52

## 2018-01-29 RX ADMIN — Medication 2: at 09:06

## 2018-01-29 RX ADMIN — INSULIN GLARGINE 68 UNIT(S): 100 INJECTION, SOLUTION SUBCUTANEOUS at 21:52

## 2018-01-29 RX ADMIN — Medication 24 UNIT(S): at 18:24

## 2018-01-29 RX ADMIN — Medication 1 SPRAY(S): at 09:54

## 2018-01-29 RX ADMIN — HEPARIN SODIUM 5000 UNIT(S): 5000 INJECTION INTRAVENOUS; SUBCUTANEOUS at 17:53

## 2018-01-29 RX ADMIN — Medication 24 UNIT(S): at 13:12

## 2018-01-29 RX ADMIN — Medication 1 SPRAY(S): at 13:13

## 2018-01-29 RX ADMIN — Medication 81 MILLIGRAM(S): at 12:21

## 2018-01-29 RX ADMIN — TICAGRELOR 90 MILLIGRAM(S): 90 TABLET ORAL at 17:52

## 2018-01-29 RX ADMIN — TICAGRELOR 90 MILLIGRAM(S): 90 TABLET ORAL at 05:54

## 2018-01-29 RX ADMIN — MONTELUKAST 10 MILLIGRAM(S): 4 TABLET, CHEWABLE ORAL at 21:52

## 2018-01-29 RX ADMIN — SODIUM CHLORIDE 3 MILLILITER(S): 9 INJECTION INTRAMUSCULAR; INTRAVENOUS; SUBCUTANEOUS at 05:54

## 2018-01-29 RX ADMIN — Medication 650 MILLIGRAM(S): at 09:06

## 2018-01-29 RX ADMIN — INSULIN GLARGINE 20 UNIT(S): 100 INJECTION, SOLUTION SUBCUTANEOUS at 09:07

## 2018-01-29 RX ADMIN — ATORVASTATIN CALCIUM 40 MILLIGRAM(S): 80 TABLET, FILM COATED ORAL at 21:52

## 2018-01-29 RX ADMIN — Medication 20 MILLIEQUIVALENT(S): at 09:55

## 2018-01-29 RX ADMIN — Medication 12.5 MILLIGRAM(S): at 05:53

## 2018-01-29 RX ADMIN — Medication 650 MILLIGRAM(S): at 13:13

## 2018-01-29 RX ADMIN — Medication 650 MILLIGRAM(S): at 21:52

## 2018-01-29 RX ADMIN — Medication 4: at 21:52

## 2018-01-29 RX ADMIN — FAMOTIDINE 20 MILLIGRAM(S): 10 INJECTION INTRAVENOUS at 12:21

## 2018-01-29 RX ADMIN — SODIUM CHLORIDE 3 MILLILITER(S): 9 INJECTION INTRAMUSCULAR; INTRAVENOUS; SUBCUTANEOUS at 21:51

## 2018-01-29 RX ADMIN — SODIUM CHLORIDE 3 MILLILITER(S): 9 INJECTION INTRAMUSCULAR; INTRAVENOUS; SUBCUTANEOUS at 13:13

## 2018-01-29 RX ADMIN — Medication 50 MICROGRAM(S): at 05:53

## 2018-01-29 RX ADMIN — Medication 650 MILLIGRAM(S): at 18:58

## 2018-01-29 RX ADMIN — Medication 650 MILLIGRAM(S): at 17:52

## 2018-01-29 NOTE — PROGRESS NOTE ADULT - SUBJECTIVE AND OBJECTIVE BOX
Dr. Muhammad (Nephrology)  Office [phone]  Cell [phone]  [facility] FI[facility]  Cell [phone]      Patient is a 72y old  Female who presents with a chief complaint of SOB (25 Jan 2018 15:14)      Patient seen and examined at bedside. +sob    VITALS:  T(F): 97 (01-29-18 @ 09:18), Max: 98.3 (01-29-18 @ 05:51)  HR: 87 (01-29-18 @ 09:18)  BP: 130/64 (01-29-18 @ 09:18)  RR: 14 (01-29-18 @ 09:18)  SpO2: 98% (01-29-18 @ 09:18)  Wt(kg): --    01-28 @ 07:01  -  01-29 @ 07:00  --------------------------------------------------------  IN: 590 mL / OUT: 350 mL / NET: 240 mL    01-29 @ 07:01 - 01-29 @ 11:30  --------------------------------------------------------  IN: 120 mL / OUT: 0 mL / NET: 120 mL          PHYSICAL EXAM:  Constitutional: NAD  Neck: No JVD  Respiratory: CTAB, no wheezes, rales or rhonchi  Cardiovascular: S1, S2, RRR  Gastrointestinal: BS+, soft, NT/ND  Extremities: No peripheral edema    Hospital Medications:   MEDICATIONS  (STANDING):  aspirin enteric coated 81 milliGRAM(s) Oral daily  atorvastatin 40 milliGRAM(s) Oral at bedtime  dextrose 5%. 1000 milliLiter(s) (50 mL/Hr) IV Continuous <Continuous>  dextrose 50% Injectable 12.5 Gram(s) IV Push once  dextrose 50% Injectable 25 Gram(s) IV Push once  dextrose 50% Injectable 25 Gram(s) IV Push once  famotidine    Tablet 20 milliGRAM(s) Oral daily  furosemide   Injectable 40 milliGRAM(s) IV Push daily  heparin  Injectable 5000 Unit(s) SubCutaneous every 12 hours  insulin glargine Injectable (LANTUS) 20 Unit(s) SubCutaneous every morning  insulin glargine Injectable (LANTUS) 68 Unit(s) SubCutaneous at bedtime  insulin lispro (HumaLOG) corrective regimen sliding scale   SubCutaneous three times a day before meals  insulin lispro (HumaLOG) corrective regimen sliding scale   SubCutaneous at bedtime  insulin lispro Injectable (HumaLOG) 24 Unit(s) SubCutaneous three times a day before meals  levothyroxine 50 MICROGram(s) Oral daily  metoprolol succinate ER 12.5 milliGRAM(s) Oral daily  montelukast 10 milliGRAM(s) Oral at bedtime  sodium bicarbonate 650 milliGRAM(s) Oral three times a day  sodium chloride 0.9% lock flush 3 milliLiter(s) IV Push every 8 hours  ticagrelor 90 milliGRAM(s) Oral two times a day      LABS:  01-29    144  |  103  |  37<H>  ----------------------------<  127<H>  3.4<L>   |  25  |  1.49<H>    Ca    9.0      29 Jan 2018 07:51  Mg     2.1     01-29      Creatinine Trend: 1.49 <--, 1.58 <--, 1.60 <--, 1.77 <--, 1.75 <--, 2.18 <--, 2.31 <--                                10.6   10.34 )-----------( 314      ( 29 Jan 2018 07:51 )             32.6     Urine Studies:  Urinalysis - [01-23-18 @ 20:09]      Color PLYEL / Appearance CLEAR / SG 1.015 / pH 5.5      Gluc 250 / Ketone NEGATIVE  / Bili NEGATIVE / Urobili NORMAL       Blood NEGATIVE / Protein 30 / Leuk Est TRACE / Nitrite NEGATIVE      RBC 0-2 / WBC 2-5 / Hyaline  / Gran  / Sq Epi OCC / Non Sq Epi  / Bacteria     Urine Creatinine 90.38      [01-23-18 @ 20:09]  Urine Urea Nitrogen 765.1      [01-23-18 @ 20:09]    Iron 40, TIBC 377, %sat --      [01-20-18 @ 06:06]  Ferritin 38.35      [01-20-18 @ 06:06]  HbA1c 9.1      [11-25-17 @ 06:30]  TSH 0.55      [01-19-18 @ 06:45]  Lipid: chol 130, , HDL 30, LDL 70      [01-19-18 @ 06:45]        RADIOLOGY & ADDITIONAL STUDIES:

## 2018-01-29 NOTE — PROGRESS NOTE ADULT - ASSESSMENT
Assessment  DMT2: 72y Female with DM T2 with hyperglycemia on insulin, blood sugars fluctuating,, no hypoglycemia,  eating meals,  non compliant with low carb diet.  Hypothyroidism: On synthroid 50 mcg po daily,  HTN: Controlled, On med.  CHF: On medications, monitored.

## 2018-01-29 NOTE — PROGRESS NOTE ADULT - SUBJECTIVE AND OBJECTIVE BOX
Patient is a 72y old  Female who presents with a chief complaint of SOB (25 Jan 2018 15:14)  NAD      INTERVAL HPI/OVERNIGHT EVENTS:  T(C): 36.1 (01-29-18 @ 09:18), Max: 36.8 (01-29-18 @ 05:51)  HR: 87 (01-29-18 @ 09:18) (85 - 97)  BP: 130/64 (01-29-18 @ 09:18) (124/75 - 130/64)  RR: 14 (01-29-18 @ 09:18) (12 - 14)  SpO2: 98% (01-29-18 @ 09:18) (98% - 100%)  Wt(kg): --  I&O's Summary    28 Jan 2018 07:01  -  29 Jan 2018 07:00  --------------------------------------------------------  IN: 590 mL / OUT: 350 mL / NET: 240 mL    29 Jan 2018 07:01  -  29 Jan 2018 15:24  --------------------------------------------------------  IN: 360 mL / OUT: 0 mL / NET: 360 mL        LABS:                        10.6   10.34 )-----------( 314      ( 29 Jan 2018 07:51 )             32.6     01-29    144  |  103  |  37<H>  ----------------------------<  127<H>  3.4<L>   |  25  |  1.49<H>    Ca    9.0      29 Jan 2018 07:51  Mg     2.1     01-29          CAPILLARY BLOOD GLUCOSE      POCT Blood Glucose.: 407 mg/dL (29 Jan 2018 13:07)  POCT Blood Glucose.: 468 mg/dL (29 Jan 2018 13:06)  POCT Blood Glucose.: 169 mg/dL (29 Jan 2018 08:47)  POCT Blood Glucose.: 286 mg/dL (28 Jan 2018 21:28)  POCT Blood Glucose.: 220 mg/dL (28 Jan 2018 17:40)            MEDICATIONS  (STANDING):  aspirin enteric coated 81 milliGRAM(s) Oral daily  atorvastatin 40 milliGRAM(s) Oral at bedtime  dextrose 5%. 1000 milliLiter(s) (50 mL/Hr) IV Continuous <Continuous>  dextrose 50% Injectable 12.5 Gram(s) IV Push once  dextrose 50% Injectable 25 Gram(s) IV Push once  dextrose 50% Injectable 25 Gram(s) IV Push once  famotidine    Tablet 20 milliGRAM(s) Oral daily  furosemide   Injectable 40 milliGRAM(s) IV Push daily  heparin  Injectable 5000 Unit(s) SubCutaneous every 12 hours  insulin glargine Injectable (LANTUS) 20 Unit(s) SubCutaneous every morning  insulin glargine Injectable (LANTUS) 68 Unit(s) SubCutaneous at bedtime  insulin lispro (HumaLOG) corrective regimen sliding scale   SubCutaneous three times a day before meals  insulin lispro (HumaLOG) corrective regimen sliding scale   SubCutaneous at bedtime  insulin lispro Injectable (HumaLOG) 24 Unit(s) SubCutaneous three times a day before meals  levothyroxine 50 MICROGram(s) Oral daily  metoprolol succinate ER 12.5 milliGRAM(s) Oral daily  montelukast 10 milliGRAM(s) Oral at bedtime  sodium bicarbonate 650 milliGRAM(s) Oral three times a day  sodium chloride 0.9% lock flush 3 milliLiter(s) IV Push every 8 hours  ticagrelor 90 milliGRAM(s) Oral two times a day    MEDICATIONS  (PRN):  acetaminophen   Tablet. 650 milliGRAM(s) Oral every 6 hours PRN Mild, moderate and severe pain  dextrose Gel 1 Dose(s) Oral once PRN Blood Glucose LESS THAN 70 milliGRAM(s)/deciliter  glucagon  Injectable 1 milliGRAM(s) IntraMuscular once PRN Glucose LESS THAN 70 milligrams/deciliter  sodium chloride 0.65% Nasal 1 Spray(s) Both Nostrils three times a day PRN Nasal Congestion          PHYSICAL EXAM:  GENERAL: NAD  CHEST/LUNG: Clear to percussion bilaterally; No rales, rhonchi, wheezing, or rubs  HEART: Regular rate and rhythm; No murmurs, rubs, or gallops  ABDOMEN: Soft, Nontender, Nondistended; Bowel sounds present  EXTREMITIES:  2+ Peripheral Pulses, No clubbing, cyanosis, or edema  LYMPH: No lymphadenopathy noted  SKIN: No rashes or lesions    Care Discussed with Consultants/Other Providers [+ ] YES  [ ] NO

## 2018-01-29 NOTE — PROGRESS NOTE ADULT - ASSESSMENT
1.	CHF decompensation, EF 32%  2.	MERVIN: renal function worsening likely hyperglycemia on lasix, FEurea<35%,  improved to baseline  3.	CKD 3, baseline 1.5-1.8  4.	CAD. cardio following  5.	Acidosis improved   6.	Hypokalemia    7.	  PLAN:   1.	diuresing per cardio  2.	Monitor BMP.  3.	Kcl 40meq x 1  1.	f/u cardio

## 2018-01-29 NOTE — PROGRESS NOTE ADULT - ASSESSMENT
Problem/Plan - 1:  ·  Problem: CHF exacerbation.  Plan: telemonitor   strict I & O's, daily wts  Fluid restriction  diuresis as per cards  further edwards as per cards    Problem/Plan - 2:  ·  Problem: Diabetes mellitus, type 2.  Plan: monitor fs   basal/bolus     Problem/Plan - 3:  ·  Problem: Hyperlipidemia.  Plan: cw statin    Problem/Plan - 4:  ·  Problem: Hypothyroidism.  Plan: cw levothyroxine.

## 2018-01-29 NOTE — PROGRESS NOTE ADULT - SUBJECTIVE AND OBJECTIVE BOX
Subjective:  no chest pain,  SOB improved     MEDICATIONS  (STANDING):  aspirin enteric coated 81 milliGRAM(s) Oral daily  atorvastatin 40 milliGRAM(s) Oral at bedtime  famotidine    Tablet 20 milliGRAM(s) Oral daily  furosemide   Injectable 40 milliGRAM(s) IV Push daily  heparin  Injectable 5000 Unit(s) SubCutaneous every 12 hours  insulin glargine Injectable (LANTUS) 20 Unit(s) SubCutaneous every morning  insulin glargine Injectable (LANTUS) 68 Unit(s) SubCutaneous at bedtime  insulin lispro (HumaLOG) corrective regimen sliding scale   SubCutaneous three times a day before meals  insulin lispro (HumaLOG) corrective regimen sliding scale   SubCutaneous at bedtime  insulin lispro Injectable (HumaLOG) 24 Unit(s) SubCutaneous three times a day before meals  levothyroxine 50 MICROGram(s) Oral daily  metoprolol succinate ER 12.5 milliGRAM(s) Oral daily  montelukast 10 milliGRAM(s) Oral at bedtime  sodium bicarbonate 650 milliGRAM(s) Oral three times a day  sodium chloride 0.9% lock flush 3 milliLiter(s) IV Push every 8 hours  ticagrelor 90 milliGRAM(s) Oral two times a day    LABS:                        10.6   10.34 )-----------( 314      ( 29 Jan 2018 07:51 )             32.6     144  |  103  |  37<H>  ----------------------------<  127<H>  3.4<L>   |  25  |  1.49<H>    Ca    9.0      29 Jan 2018 07:51  Mg     2.1     01-29    Creatinine Trend: 1.49<--, 1.58<--, 1.60<--, 1.77<--, 1.75<--, 2.18<--     PHYSICAL EXAM  Vital Signs Last 24 Hrs  T(C): 36.1 (29 Jan 2018 09:18), Max: 36.8 (29 Jan 2018 05:51)  T(F): 97 (29 Jan 2018 09:18), Max: 98.3 (29 Jan 2018 05:51)  HR: 87 (29 Jan 2018 09:18) (85 - 97)  BP: 130/64 (29 Jan 2018 09:18) (124/75 - 130/64)  RR: 14 (29 Jan 2018 09:18) (12 - 14)  SpO2: 98% (29 Jan 2018 09:18) (98% - 100%)  	   Cardiovascular: S1S2 RRR  Respiratory: CTA BL with decreased breath sounds B/L LLs  Gastrointestinal:  Soft, Non-tender, + BS	  Extremities No edema, cyanosis or clubbing  B/L LE's     DIAGNOSTIC DATA:     TELEMETRY: SR 	       < from: Cardiac Cath Lab - Adult (10.13.17 @ 10:40) >  VENTRICLES: No left ventriculogram was performed.  CORONARY VESSELS: The coronary circulation is right dominant.  LM:   --  LM: Normal.  LAD:   --  Proximal LAD: There was a diffuse 60 % stenosis.  --  Mid LAD: There was a 100 % stenosis with the distal vessel being filled  via LIMA-LAD.  CX:   --  Circumflex: The vessel was small sized. Angiography showed mild  atherosclerosis withno flow limiting lesions.  --  OM1: Angiography showed mild atherosclerosis with no flow limiting  lesions.  RI:   --  Ostial ramus intermedius: There was a tubular 80 % stenosis.  There was PARUL grade 3 flow through the vessel (brisk flow).  RCA:   --  Proximal RCA: Angiography showed mild atherosclerosis with no  flow limiting lesions.  --  Mid RCA: There was a 100 % stenosis with the distal vessel being filled  via collaterals. This lesion is a chronic total occlusion.  GRAFTS:   --  Graft to the LAD: The graft was a LIMA. Graft angiography  showed minor luminal irregularities. Distal vessel angiography showed  minor luminal irregularities.  AORTA: Ascending aorta: Normal. No additional grafts visualized in  aortogram.  COMPLICATIONS: There were no complications.  DIAGNOSTIC RECOMMENDATIONS: Coronary angiogram demonstrates patent  LIMA-LAD, closed SVG grafts, and a severe stenosis in the ostial Ramus.  Will perform staged PCI to RI when Cr stable and renally optimized.  Prepared and signed by  Corie Ga M.D.  Signed 10/17/2017 13:49:15    < end of copied text >    < from: Cardiac Cath Lab - Adult (11.17.17 @ 11:48) >  PROCEDURE:  --  Intervention on ramus intermedius: drug-eluting stent.    VENTRICLES: No LV gram was performed; however, a recent echocardiogram  demonstrated normal global and regional LV function.  CORONARY VESSELS: The coronary circulation is right dominant.  LM:   --  LM: Angiography showed minor luminal irregularities with no flow  limiting lesions.  LAD:   --  Ostial LAD: There was a 100 % stenosis.  CX:   --  Circumflex: This vessel was not injected, but was visualized  during a prior cardiac catheterization.  RI:   --  Ramus intermedius: There was a 95 % stenosis.  RCA:   --  RCA: This vessel was not injected.  COMPLICATIONS: There were no complications.  DIAGNOSTIC RECOMMENDATIONS: The patient should continue with the present  medications. will add plavix 75mg po once a day/ will add ECASA 325mg po  once day.  Prepared and signed by  Roberta Quick M.D.  Signed 11/17/2017 13:16:01    < end of copied text >    < from: TTE with Doppler (w/Cont) (11.25.17 @ 12:33) >  CONCLUSIONS:  1. Mitral annular calcification, otherwise normal mitral  valve. Mild mitral regurgitation.  2. Normal left ventricular internal dimensions and wall  thicknesses.  3. Endocardium not well visualized; grossly moderate to  severe segmental left ventricularsystolic dysfunction.  Hypokinesis of the inferior, lateral, and inferolateral  walls.  Endocardial visualization enhanced with intravenous  injection of echo contrast (Definity).  No LV thrombus  seen.  4. The right ventricle is not well visualized;grossly  normal right ventricular systolic function.  ------------------------------------------------------------------------  Confirmed on  11/25/2017 - 14:44:41 by Jose Lucia M.D.    < end of copied text >    < from: Cardiac Cath Lab - Adult (12.05.17 @ 12:48) >  VENTRICLES: No LV gram was performed; however, a recent echocardiogram  demonstrated an EF of 36 %.  CORONARY VESSELS: The coronary circulation is right dominant.  LM:   --  Distal left main: Angiography showed minor luminal irregularities  with no flow limiting lesions.  LAD:   --  Mid LAD: There was a 100 % stenosis with the distal vessel being  filled via LIMA-LAD.  CX:   --  Proximal circumflex: There was a tubular 20 % stenosis.  --  Mid circumflex: Angiography showed minor luminal irregularities with no  flow limiting lesions.  --  Distal circumflex: Angiography showed minor luminal irregularities with  no flow limiting lesions.  --  OM1: The vessel was small sized. There was a discrete 60 % stenosis.  RI:   --  Ostial ramus intermedius: Angiography showed minor luminal  irregularities with no flow limiting lesions. There was no significant  restenosis in RI stent.  --  Proximal ramus intermedius: Angiography showed minor luminal  irregularities with no flow limiting lesions.  --  Mid ramus intermedius: There was a tubular 80 % stenosis. The lesion  was associated with a small filling defect consistent with thrombus.  RCA:   --  Mid RCA: There was a 100 % stenosis with the distal vessel being  filled via collaterals from the LAD.  GRAFTS:   --  Graft to the LAD: The graft was a LIMA. Graft angiography  showed minor luminal irregularities. Distal vessel angiography showed  minor luminalirregularities.  COMPLICATIONS: There were no complications.  DIAGNOSTIC RECOMMENDATIONS: Coronary angiogram demonstrates a severe  stenosis in the ramus intermedius that is the cause of the patient's  clinical presentation. Will therefore perform PCI to the RI.  INTERVENTIONAL RECOMMENDATIONS: S/p successful CORNELIA to the Ramus  Intermedius. The patient should continue with ASA and Brilinta for at  least 12 months.  Prepared and signed by  Corie aG M.D.  Signed 12/06/2017 17:06:30    < from: CT Chest No Cont (01.20.18 @ 09:55) >  IMPRESSION:   Mild pulmonary edema with small simple bilateral pleural effusions, right   greater than left, with no evidence of loculation.    < end of copied text >    < from: TTE with Doppler (w/Cont) (01.23.18 @ 12:31) >  CONCLUSIONS:  1. Mitral annular calcification, otherwise normal mitral  valve. Mild-moderate mitral regurgitation.  2. Calcified trileaflet aortic valve with normal opening.  Mild aortic regurgitation.  3. Normal left ventricular internal dimensions and wall  thicknesses.  4. Endocardium not well visualized; grossly severe global  left ventricular systolic dysfunction.  Endocardial  visualization enhanced with intravenous injection of echo  contrast (Definity).  5. The right ventricle is not well visualized; grossly  normal right ventricular systolic function.  *** Compared with echocardiogram of 11/25/2017, no  significant changes noted.  ------------------------------------------------------------------------  Confirmed on  1/23/2018 - 14:35:49 by Jose Lucia M.D.    < end of copied text >      ASSESSMENT/PLAN: 	72y Female w/ PMH HTN, CKD, Hyperlipidemia, DM-II, CAD s/p 3V CABG  (LIMA to LAD, SVG to OM, SVG to PDA) at [facility] 2014, s/p CORNELIA TO RI 11/17/17, NSTEMI 12/2017 s/p [facility] on 12/5 and CORNELIA to D Ramus, TTE at that time revealed grossly moderate to severe LV dysfunction with hypokinesis of the inferior, lateral and inferolateral with an EF of 36% admitted with acute on chronic systolic CHF     --C/W IV  Lasix.  Keep O>I monitor K & cr levels --> change to PO in AM  --mildly elevated trops noted in setting of CKD & CHF exacerbation  - no need for urgent repeat cath at this time  --CT chest noted above  --Repeat TTE unchanged from prior. EF 32%  --Cont. DAPT for recent CORNELIA  --F/U NST final report    Juliane Fitch PA-C  [facility]  [address], [address]   [place] [address]  office [phone]  pager [phone]

## 2018-01-29 NOTE — PROGRESS NOTE ADULT - SUBJECTIVE AND OBJECTIVE BOX
Chief complaint  Patient is a 72y old  Female who presents with a chief complaint of SOB (25 Jan 2018 15:14)   Review of systems  Patient in bed, looks comfortable, no fever,  no hypoglycemia.    Labs and Fingersticks  CAPILLARY BLOOD GLUCOSE      POCT Blood Glucose.: 234 mg/dL (29 Jan 2018 17:35)  POCT Blood Glucose.: 407 mg/dL (29 Jan 2018 13:07)  POCT Blood Glucose.: 468 mg/dL (29 Jan 2018 13:06)  POCT Blood Glucose.: 169 mg/dL (29 Jan 2018 08:47)  POCT Blood Glucose.: 286 mg/dL (28 Jan 2018 21:28)          Calcium, Total Serum: 9.0 (01-29 @ 07:51)  Calcium, Total Serum: 9.3 (01-28 @ 05:15)          01-29    144  |  103  |  37<H>  ----------------------------<  127<H>  3.4<L>   |  25  |  1.49<H>    Ca    9.0      29 Jan 2018 07:51  Mg     2.1     01-29                          10.6   10.34 )-----------( 314      ( 29 Jan 2018 07:51 )             32.6     Medications  MEDICATIONS  (STANDING):  aspirin enteric coated 81 milliGRAM(s) Oral daily  atorvastatin 40 milliGRAM(s) Oral at bedtime  dextrose 5%. 1000 milliLiter(s) (50 mL/Hr) IV Continuous <Continuous>  dextrose 50% Injectable 12.5 Gram(s) IV Push once  dextrose 50% Injectable 25 Gram(s) IV Push once  dextrose 50% Injectable 25 Gram(s) IV Push once  famotidine    Tablet 20 milliGRAM(s) Oral daily  furosemide   Injectable 40 milliGRAM(s) IV Push daily  heparin  Injectable 5000 Unit(s) SubCutaneous every 12 hours  insulin glargine Injectable (LANTUS) 20 Unit(s) SubCutaneous every morning  insulin glargine Injectable (LANTUS) 68 Unit(s) SubCutaneous at bedtime  insulin lispro (HumaLOG) corrective regimen sliding scale   SubCutaneous three times a day before meals  insulin lispro (HumaLOG) corrective regimen sliding scale   SubCutaneous at bedtime  insulin lispro Injectable (HumaLOG) 24 Unit(s) SubCutaneous three times a day before meals  levothyroxine 50 MICROGram(s) Oral daily  metoprolol succinate ER 12.5 milliGRAM(s) Oral daily  montelukast 10 milliGRAM(s) Oral at bedtime  sodium bicarbonate 650 milliGRAM(s) Oral three times a day  sodium chloride 0.9% lock flush 3 milliLiter(s) IV Push every 8 hours  ticagrelor 90 milliGRAM(s) Oral two times a day      Physical Exam  General: Patient comfortable in bed  Vital Signs Last 12 Hrs  T(F): 97.6 (01-29-18 @ 16:25), Max: 97.6 (01-29-18 @ 16:25)  HR: 87 (01-29-18 @ 16:25) (87 - 87)  BP: 123/59 (01-29-18 @ 16:25) (123/59 - 130/64)  BP(mean): --  RR: 16 (01-29-18 @ 16:25) (14 - 16)  SpO2: 99% (01-29-18 @ 16:25) (98% - 99%)  Neck: No palpable thyroid nodules.  CVS: S1S2, No murmurs  Respiratory: No wheezing, no crepitations  GI: Abdomen soft, bowel sounds positive  Musculoskeletal:  edema lower extremities.   Skin: No skin rashes, no ecchymosis    Diagnostics

## 2018-01-30 DIAGNOSIS — J06.9 ACUTE UPPER RESPIRATORY INFECTION, UNSPECIFIED: ICD-10-CM

## 2018-01-30 DIAGNOSIS — R50.9 FEVER, UNSPECIFIED: ICD-10-CM

## 2018-01-30 LAB
APPEARANCE UR: CLEAR — SIGNIFICANT CHANGE UP
B PERT DNA SPEC QL NAA+PROBE: SIGNIFICANT CHANGE UP
BILIRUB UR-MCNC: NEGATIVE — SIGNIFICANT CHANGE UP
BLOOD UR QL VISUAL: HIGH
BUN SERPL-MCNC: 30 MG/DL — HIGH (ref 7–23)
C PNEUM DNA SPEC QL NAA+PROBE: NOT DETECTED — SIGNIFICANT CHANGE UP
CALCIUM SERPL-MCNC: 8.8 MG/DL — SIGNIFICANT CHANGE UP (ref 8.4–10.5)
CHLORIDE SERPL-SCNC: 101 MMOL/L — SIGNIFICANT CHANGE UP (ref 98–107)
CO2 SERPL-SCNC: 24 MMOL/L — SIGNIFICANT CHANGE UP (ref 22–31)
COLOR SPEC: SIGNIFICANT CHANGE UP
CREAT SERPL-MCNC: 1.53 MG/DL — HIGH (ref 0.5–1.3)
FLUAV H1 2009 PAND RNA SPEC QL NAA+PROBE: NOT DETECTED — SIGNIFICANT CHANGE UP
FLUAV H1 RNA SPEC QL NAA+PROBE: NOT DETECTED — SIGNIFICANT CHANGE UP
FLUAV H3 RNA SPEC QL NAA+PROBE: NOT DETECTED — SIGNIFICANT CHANGE UP
FLUAV SUBTYP SPEC NAA+PROBE: SIGNIFICANT CHANGE UP
FLUBV RNA SPEC QL NAA+PROBE: NOT DETECTED — SIGNIFICANT CHANGE UP
GLUCOSE BLDC GLUCOMTR-MCNC: 175 MG/DL — HIGH (ref 70–99)
GLUCOSE BLDC GLUCOMTR-MCNC: 188 MG/DL — HIGH (ref 70–99)
GLUCOSE BLDC GLUCOMTR-MCNC: 235 MG/DL — HIGH (ref 70–99)
GLUCOSE BLDC GLUCOMTR-MCNC: 260 MG/DL — HIGH (ref 70–99)
GLUCOSE SERPL-MCNC: 178 MG/DL — HIGH (ref 70–99)
GLUCOSE UR-MCNC: >1000 — HIGH
HADV DNA SPEC QL NAA+PROBE: NOT DETECTED — SIGNIFICANT CHANGE UP
HCOV 229E RNA SPEC QL NAA+PROBE: NOT DETECTED — SIGNIFICANT CHANGE UP
HCOV HKU1 RNA SPEC QL NAA+PROBE: NOT DETECTED — SIGNIFICANT CHANGE UP
HCOV NL63 RNA SPEC QL NAA+PROBE: NOT DETECTED — SIGNIFICANT CHANGE UP
HCOV OC43 RNA SPEC QL NAA+PROBE: NOT DETECTED — SIGNIFICANT CHANGE UP
HCT VFR BLD CALC: 30.7 % — LOW (ref 34.5–45)
HGB BLD-MCNC: 9.8 G/DL — LOW (ref 11.5–15.5)
HMPV RNA SPEC QL NAA+PROBE: NOT DETECTED — SIGNIFICANT CHANGE UP
HPIV1 RNA SPEC QL NAA+PROBE: NOT DETECTED — SIGNIFICANT CHANGE UP
HPIV2 RNA SPEC QL NAA+PROBE: NOT DETECTED — SIGNIFICANT CHANGE UP
HPIV3 RNA SPEC QL NAA+PROBE: NOT DETECTED — SIGNIFICANT CHANGE UP
HPIV4 RNA SPEC QL NAA+PROBE: NOT DETECTED — SIGNIFICANT CHANGE UP
KETONES UR-MCNC: NEGATIVE — SIGNIFICANT CHANGE UP
LEUKOCYTE ESTERASE UR-ACNC: NEGATIVE — SIGNIFICANT CHANGE UP
M PNEUMO DNA SPEC QL NAA+PROBE: NOT DETECTED — SIGNIFICANT CHANGE UP
MAGNESIUM SERPL-MCNC: 2 MG/DL — SIGNIFICANT CHANGE UP (ref 1.6–2.6)
MCHC RBC-ENTMCNC: 26.8 PG — LOW (ref 27–34)
MCHC RBC-ENTMCNC: 31.9 % — LOW (ref 32–36)
MCV RBC AUTO: 84.1 FL — SIGNIFICANT CHANGE UP (ref 80–100)
NITRITE UR-MCNC: NEGATIVE — SIGNIFICANT CHANGE UP
NRBC # FLD: 0 — SIGNIFICANT CHANGE UP
PH UR: 6.5 — SIGNIFICANT CHANGE UP (ref 4.6–8)
PLATELET # BLD AUTO: 292 K/UL — SIGNIFICANT CHANGE UP (ref 150–400)
PMV BLD: 9.6 FL — SIGNIFICANT CHANGE UP (ref 7–13)
POTASSIUM SERPL-MCNC: 4 MMOL/L — SIGNIFICANT CHANGE UP (ref 3.5–5.3)
POTASSIUM SERPL-SCNC: 4 MMOL/L — SIGNIFICANT CHANGE UP (ref 3.5–5.3)
PROT UR-MCNC: 150 MG/DL — HIGH
RBC # BLD: 3.65 M/UL — LOW (ref 3.8–5.2)
RBC # FLD: 14.7 % — HIGH (ref 10.3–14.5)
RBC CASTS # UR COMP ASSIST: SIGNIFICANT CHANGE UP (ref 0–?)
RSV RNA SPEC QL NAA+PROBE: NOT DETECTED — SIGNIFICANT CHANGE UP
RV+EV RNA SPEC QL NAA+PROBE: NOT DETECTED — SIGNIFICANT CHANGE UP
SODIUM SERPL-SCNC: 139 MMOL/L — SIGNIFICANT CHANGE UP (ref 135–145)
SP GR SPEC: 1.02 — SIGNIFICANT CHANGE UP (ref 1–1.04)
SQUAMOUS # UR AUTO: SIGNIFICANT CHANGE UP
UROBILINOGEN FLD QL: NORMAL MG/DL — SIGNIFICANT CHANGE UP
WBC # BLD: 8.26 K/UL — SIGNIFICANT CHANGE UP (ref 3.8–10.5)
WBC # FLD AUTO: 8.26 K/UL — SIGNIFICANT CHANGE UP (ref 3.8–10.5)
WBC UR QL: SIGNIFICANT CHANGE UP (ref 0–?)

## 2018-01-30 PROCEDURE — 99222 1ST HOSP IP/OBS MODERATE 55: CPT

## 2018-01-30 PROCEDURE — 71045 X-RAY EXAM CHEST 1 VIEW: CPT | Mod: 26

## 2018-01-30 RX ORDER — ACETAMINOPHEN 500 MG
650 TABLET ORAL EVERY 6 HOURS
Qty: 0 | Refills: 0 | Status: DISCONTINUED | OUTPATIENT
Start: 2018-01-30 | End: 2018-02-09

## 2018-01-30 RX ORDER — ACETAMINOPHEN 500 MG
650 TABLET ORAL ONCE
Qty: 0 | Refills: 0 | Status: COMPLETED | OUTPATIENT
Start: 2018-01-30 | End: 2018-01-30

## 2018-01-30 RX ORDER — BENZOCAINE AND MENTHOL 5; 1 G/100ML; G/100ML
1 LIQUID ORAL EVERY 6 HOURS
Qty: 0 | Refills: 0 | Status: DISCONTINUED | OUTPATIENT
Start: 2018-01-30 | End: 2018-02-09

## 2018-01-30 RX ADMIN — Medication 650 MILLIGRAM(S): at 22:08

## 2018-01-30 RX ADMIN — FAMOTIDINE 20 MILLIGRAM(S): 10 INJECTION INTRAVENOUS at 13:23

## 2018-01-30 RX ADMIN — Medication 81 MILLIGRAM(S): at 13:23

## 2018-01-30 RX ADMIN — Medication 650 MILLIGRAM(S): at 15:01

## 2018-01-30 RX ADMIN — SODIUM CHLORIDE 3 MILLILITER(S): 9 INJECTION INTRAMUSCULAR; INTRAVENOUS; SUBCUTANEOUS at 05:42

## 2018-01-30 RX ADMIN — ATORVASTATIN CALCIUM 40 MILLIGRAM(S): 80 TABLET, FILM COATED ORAL at 22:08

## 2018-01-30 RX ADMIN — Medication 24 UNIT(S): at 18:17

## 2018-01-30 RX ADMIN — MONTELUKAST 10 MILLIGRAM(S): 4 TABLET, CHEWABLE ORAL at 22:08

## 2018-01-30 RX ADMIN — INSULIN GLARGINE 68 UNIT(S): 100 INJECTION, SOLUTION SUBCUTANEOUS at 22:09

## 2018-01-30 RX ADMIN — Medication 24 UNIT(S): at 13:23

## 2018-01-30 RX ADMIN — Medication 12.5 MILLIGRAM(S): at 05:42

## 2018-01-30 RX ADMIN — Medication 50 MICROGRAM(S): at 05:42

## 2018-01-30 RX ADMIN — Medication 2: at 08:59

## 2018-01-30 RX ADMIN — Medication 24 UNIT(S): at 08:59

## 2018-01-30 RX ADMIN — Medication 650 MILLIGRAM(S): at 05:42

## 2018-01-30 RX ADMIN — TICAGRELOR 90 MILLIGRAM(S): 90 TABLET ORAL at 17:31

## 2018-01-30 RX ADMIN — Medication 2: at 13:23

## 2018-01-30 RX ADMIN — BENZOCAINE AND MENTHOL 1 LOZENGE: 5; 1 LIQUID ORAL at 22:47

## 2018-01-30 RX ADMIN — Medication 100 MILLIGRAM(S): at 22:47

## 2018-01-30 RX ADMIN — Medication 650 MILLIGRAM(S): at 13:23

## 2018-01-30 RX ADMIN — SODIUM CHLORIDE 3 MILLILITER(S): 9 INJECTION INTRAMUSCULAR; INTRAVENOUS; SUBCUTANEOUS at 22:07

## 2018-01-30 RX ADMIN — HEPARIN SODIUM 5000 UNIT(S): 5000 INJECTION INTRAVENOUS; SUBCUTANEOUS at 17:31

## 2018-01-30 RX ADMIN — Medication 4: at 18:17

## 2018-01-30 RX ADMIN — TICAGRELOR 90 MILLIGRAM(S): 90 TABLET ORAL at 05:42

## 2018-01-30 RX ADMIN — INSULIN GLARGINE 20 UNIT(S): 100 INJECTION, SOLUTION SUBCUTANEOUS at 09:00

## 2018-01-30 RX ADMIN — HEPARIN SODIUM 5000 UNIT(S): 5000 INJECTION INTRAVENOUS; SUBCUTANEOUS at 05:42

## 2018-01-30 RX ADMIN — Medication 2: at 22:09

## 2018-01-30 RX ADMIN — Medication 650 MILLIGRAM(S): at 03:45

## 2018-01-30 NOTE — CONSULT NOTE ADULT - ATTENDING COMMENTS
Noman Newberry  Attending Physician   Division of Infectious Disease  Pager #[phone]  After 5pm/weekend or no response, call #[phone]

## 2018-01-30 NOTE — PROGRESS NOTE ADULT - SUBJECTIVE AND OBJECTIVE BOX
Patient is a 72y old  Female who presents with a chief complaint of SOB (2018 15:14)      INTERVAL HPI/OVERNIGHT EVENTS:  T(C): 38.2 (18 @ 14:53), Max: 38.2 (18 @ 14:53)  HR: 95 (18 @ 13:47) (87 - 105)  BP: 135/89 (18 @ 13:47) (103/65 - 135/89)  RR: 16 (18 @ 13:47) (16 - 16)  SpO2: 100% (18 @ 13:47) (97% - 100%)  Wt(kg): --  I&O's Summary    2018 07:  -  2018 07:00  --------------------------------------------------------  IN: 360 mL / OUT: 0 mL / NET: 360 mL    2018 07:  -  2018 16:21  --------------------------------------------------------  IN: 640 mL / OUT: 0 mL / NET: 640 mL        LABS:                        9.8    8.26  )-----------( 292      ( 2018 09:19 )             30.7         139  |  101  |  30<H>  ----------------------------<  178<H>  4.0   |  24  |  1.53<H>    Ca    8.8      2018 09:16  Mg     2.0             Urinalysis Basic - ( 2018 15:20 )    Color: PLYEL / Appearance: CLEAR / S.018 / pH: 6.5  Gluc: >1000 / Ketone: NEGATIVE  / Bili: NEGATIVE / Urobili: NORMAL mg/dL   Blood: TRACE / Protein: 150 mg/dL / Nitrite: NEGATIVE   Leuk Esterase: NEGATIVE / RBC: 0-2 / WBC 0-2   Sq Epi: OCC / Non Sq Epi: x / Bacteria: x      CAPILLARY BLOOD GLUCOSE      POCT Blood Glucose.: 188 mg/dL (2018 13:05)  POCT Blood Glucose.: 175 mg/dL (2018 08:46)  POCT Blood Glucose.: 317 mg/dL (2018 21:42)  POCT Blood Glucose.: 234 mg/dL (2018 17:35)        Urinalysis Basic - ( 2018 15:20 )    Color: PLYEL / Appearance: CLEAR / S.018 / pH: 6.5  Gluc: >1000 / Ketone: NEGATIVE  / Bili: NEGATIVE / Urobili: NORMAL mg/dL   Blood: TRACE / Protein: 150 mg/dL / Nitrite: NEGATIVE   Leuk Esterase: NEGATIVE / RBC: 0-2 / WBC 0-2   Sq Epi: OCC / Non Sq Epi: x / Bacteria: x        MEDICATIONS  (STANDING):  aspirin enteric coated 81 milliGRAM(s) Oral daily  atorvastatin 40 milliGRAM(s) Oral at bedtime  dextrose 5%. 1000 milliLiter(s) (50 mL/Hr) IV Continuous <Continuous>  dextrose 50% Injectable 12.5 Gram(s) IV Push once  dextrose 50% Injectable 25 Gram(s) IV Push once  dextrose 50% Injectable 25 Gram(s) IV Push once  famotidine    Tablet 20 milliGRAM(s) Oral daily  heparin  Injectable 5000 Unit(s) SubCutaneous every 12 hours  insulin glargine Injectable (LANTUS) 20 Unit(s) SubCutaneous every morning  insulin glargine Injectable (LANTUS) 68 Unit(s) SubCutaneous at bedtime  insulin lispro (HumaLOG) corrective regimen sliding scale   SubCutaneous three times a day before meals  insulin lispro (HumaLOG) corrective regimen sliding scale   SubCutaneous at bedtime  insulin lispro Injectable (HumaLOG) 24 Unit(s) SubCutaneous three times a day before meals  levothyroxine 50 MICROGram(s) Oral daily  metoprolol succinate ER 12.5 milliGRAM(s) Oral daily  montelukast 10 milliGRAM(s) Oral at bedtime  sodium bicarbonate 650 milliGRAM(s) Oral three times a day  sodium chloride 0.9% lock flush 3 milliLiter(s) IV Push every 8 hours  ticagrelor 90 milliGRAM(s) Oral two times a day    MEDICATIONS  (PRN):  acetaminophen   Tablet 650 milliGRAM(s) Oral every 6 hours PRN For Temp greater than 38 C (100.4 F)  acetaminophen   Tablet. 650 milliGRAM(s) Oral every 6 hours PRN Mild, moderate and severe pain  dextrose Gel 1 Dose(s) Oral once PRN Blood Glucose LESS THAN 70 milliGRAM(s)/deciliter  glucagon  Injectable 1 milliGRAM(s) IntraMuscular once PRN Glucose LESS THAN 70 milligrams/deciliter  sodium chloride 0.65% Nasal 1 Spray(s) Both Nostrils three times a day PRN Nasal Congestion          PHYSICAL EXAM:  GENERAL: NAD, well-groomed, well-developed  HEAD:  Atraumatic, Normocephalic  CHEST/LUNG: Clear to percussion bilaterally; No rales, rhonchi, wheezing, or rubs  HEART: Regular rate and rhythm; No murmurs, rubs, or gallops  ABDOMEN: Soft, Nontender, Nondistended; Bowel sounds present  EXTREMITIES:  2+ Peripheral Pulses, No clubbing, cyanosis, or edema  LYMPH: No lymphadenopathy noted  SKIN: No rashes or lesions    Care Discussed with Consultants/Other Providers [ +] YES  [ ] NO

## 2018-01-30 NOTE — PROGRESS NOTE ADULT - SUBJECTIVE AND OBJECTIVE BOX
Subjective:  no chest pain,  SOB improved     MEDICATIONS  (STANDING):  aspirin enteric coated 81 milliGRAM(s) Oral daily  atorvastatin 40 milliGRAM(s) Oral at bedtime  famotidine    Tablet 20 milliGRAM(s) Oral daily  heparin  Injectable 5000 Unit(s) SubCutaneous every 12 hours  insulin glargine Injectable (LANTUS) 20 Unit(s) SubCutaneous every morning  insulin glargine Injectable (LANTUS) 68 Unit(s) SubCutaneous at bedtime  insulin lispro (HumaLOG) corrective regimen sliding scale   SubCutaneous three times a day before meals  insulin lispro (HumaLOG) corrective regimen sliding scale   SubCutaneous at bedtime  insulin lispro Injectable (HumaLOG) 24 Unit(s) SubCutaneous three times a day before meals  levothyroxine 50 MICROGram(s) Oral daily  metoprolol succinate ER 12.5 milliGRAM(s) Oral daily  montelukast 10 milliGRAM(s) Oral at bedtime  sodium bicarbonate 650 milliGRAM(s) Oral three times a day  sodium chloride 0.9% lock flush 3 milliLiter(s) IV Push every 8 hours  ticagrelor 90 milliGRAM(s) Oral two times a day    LABS:                        9.8    8.26  )-----------( 292      ( 30 Jan 2018 09:19 )             30.7     139  |  101  |  30<H>  ----------------------------<  178<H>  4.0   |  24  |  1.53<H>    Ca    8.8      30 Jan 2018 09:16  Mg     2.0     01-30    PHYSICAL EXAM  Vital Signs Last 24 Hrs  T(C): 38.2 (30 Jan 2018 14:53), Max: 38.2 (30 Jan 2018 14:53)  T(F): 100.7 (30 Jan 2018 14:53), Max: 100.7 (30 Jan 2018 14:53)  HR: 95 (30 Jan 2018 13:47) (87 - 105)  BP: 135/89 (30 Jan 2018 13:47) (103/65 - 135/89)  RR: 16 (30 Jan 2018 13:47) (16 - 16)  SpO2: 100% (30 Jan 2018 13:47) (97% - 100%)  	   Cardiovascular: S1S2 RRR  Respiratory: CTA BL with decreased breath sounds B/L LLs  Gastrointestinal:  Soft, Non-tender, + BS	  Extremities No edema, cyanosis or clubbing  B/L LE's     DIAGNOSTIC DATA:     TELEMETRY: SR 	       < from: Cardiac Cath Lab - Adult (10.13.17 @ 10:40) >  VENTRICLES: No left ventriculogram was performed.  CORONARY VESSELS: The coronary circulation is right dominant.  LM:   --  LM: Normal.  LAD:   --  Proximal LAD: There was a diffuse 60 % stenosis.  --  Mid LAD: There was a 100 % stenosis with the distal vessel being filled  via LIMA-LAD.  CX:   --  Circumflex: The vessel was small sized. Angiography showed mild  atherosclerosis withno flow limiting lesions.  --  OM1: Angiography showed mild atherosclerosis with no flow limiting  lesions.  RI:   --  Ostial ramus intermedius: There was a tubular 80 % stenosis.  There was PARUL grade 3 flow through the vessel (brisk flow).  RCA:   --  Proximal RCA: Angiography showed mild atherosclerosis with no  flow limiting lesions.  --  Mid RCA: There was a 100 % stenosis with the distal vessel being filled  via collaterals. This lesion is a chronic total occlusion.  GRAFTS:   --  Graft to the LAD: The graft was a LIMA. Graft angiography  showed minor luminal irregularities. Distal vessel angiography showed  minor luminal irregularities.  AORTA: Ascending aorta: Normal. No additional grafts visualized in  aortogram.  COMPLICATIONS: There were no complications.  DIAGNOSTIC RECOMMENDATIONS: Coronary angiogram demonstrates patent  LIMA-LAD, closed SVG grafts, and a severe stenosis in the ostial Ramus.  Will perform staged PCI to RI when Cr stable and renally optimized.  Prepared and signed by  Corie Ga M.D.  Signed 10/17/2017 13:49:15    < end of copied text >    < from: Cardiac Cath Lab - Adult (11.17.17 @ 11:48) >  PROCEDURE:  --  Intervention on ramus intermedius: drug-eluting stent.    VENTRICLES: No LV gram was performed; however, a recent echocardiogram  demonstrated normal global and regional LV function.  CORONARY VESSELS: The coronary circulation is right dominant.  LM:   --  LM: Angiography showed minor luminal irregularities with no flow  limiting lesions.  LAD:   --  Ostial LAD: There was a 100 % stenosis.  CX:   --  Circumflex: This vessel was not injected, but was visualized  during a prior cardiac catheterization.  RI:   --  Ramus intermedius: There was a 95 % stenosis.  RCA:   --  RCA: This vessel was not injected.  COMPLICATIONS: There were no complications.  DIAGNOSTIC RECOMMENDATIONS: The patient should continue with the present  medications. will add plavix 75mg po once a day/ will add ECASA 325mg po  once day.  Prepared and signed by  Roberta Quick M.D.  Signed 11/17/2017 13:16:01    < end of copied text >    < from: TTE with Doppler (w/Cont) (11.25.17 @ 12:33) >  CONCLUSIONS:  1. Mitral annular calcification, otherwise normal mitral  valve. Mild mitral regurgitation.  2. Normal left ventricular internal dimensions and wall  thicknesses.  3. Endocardium not well visualized; grossly moderate to  severe segmental left ventricularsystolic dysfunction.  Hypokinesis of the inferior, lateral, and inferolateral  walls.  Endocardial visualization enhanced with intravenous  injection of echo contrast (Definity).  No LV thrombus  seen.  4. The right ventricle is not well visualized;grossly  normal right ventricular systolic function.  ------------------------------------------------------------------------  Confirmed on  11/25/2017 - 14:44:41 by Jose Lucia M.D.    < end of copied text >    < from: Cardiac Cath Lab - Adult (12.05.17 @ 12:48) >  VENTRICLES: No LV gram was performed; however, a recent echocardiogram  demonstrated an EF of 36 %.  CORONARY VESSELS: The coronary circulation is right dominant.  LM:   --  Distal left main: Angiography showed minor luminal irregularities  with no flow limiting lesions.  LAD:   --  Mid LAD: There was a 100 % stenosis with the distal vessel being  filled via LIMA-LAD.  CX:   --  Proximal circumflex: There was a tubular 20 % stenosis.  --  Mid circumflex: Angiography showed minor luminal irregularities with no  flow limiting lesions.  --  Distal circumflex: Angiography showed minor luminal irregularities with  no flow limiting lesions.  --  OM1: The vessel was small sized. There was a discrete 60 % stenosis.  RI:   --  Ostial ramus intermedius: Angiography showed minor luminal  irregularities with no flow limiting lesions. There was no significant  restenosis in RI stent.  --  Proximal ramus intermedius: Angiography showed minor luminal  irregularities with no flow limiting lesions.  --  Mid ramus intermedius: There was a tubular 80 % stenosis. The lesion  was associated with a small filling defect consistent with thrombus.  RCA:   --  Mid RCA: There was a 100 % stenosis with the distal vessel being  filled via collaterals from the LAD.  GRAFTS:   --  Graft to the LAD: The graft was a LIMA. Graft angiography  showed minor luminal irregularities. Distal vessel angiography showed  minor luminalirregularities.  COMPLICATIONS: There were no complications.  DIAGNOSTIC RECOMMENDATIONS: Coronary angiogram demonstrates a severe  stenosis in the ramus intermedius that is the cause of the patient's  clinical presentation. Will therefore perform PCI to the RI.  INTERVENTIONAL RECOMMENDATIONS: S/p successful CORNELIA to the Ramus  Intermedius. The patient should continue with ASA and Brilinta for at  least 12 months.  Prepared and signed by  Corie Ga M.D.  Signed 12/06/2017 17:06:30    < from: CT Chest No Cont (01.20.18 @ 09:55) >  IMPRESSION:   Mild pulmonary edema with small simple bilateral pleural effusions, right   greater than left, with no evidence of loculation.    < end of copied text >    < from: TTE with Doppler (w/Cont) (01.23.18 @ 12:31) >  CONCLUSIONS:  1. Mitral annular calcification, otherwise normal mitral  valve. Mild-moderate mitral regurgitation.  2. Calcified trileaflet aortic valve with normal opening.  Mild aortic regurgitation.  3. Normal left ventricular internal dimensions and wall  thicknesses.  4. Endocardium not well visualized; grossly severe global  left ventricular systolic dysfunction.  Endocardial  visualization enhanced with intravenous injection of echo  contrast (Definity).  5. The right ventricle is not well visualized; grossly  normal right ventricular systolic function.  *** Compared with echocardiogram of 11/25/2017, no  significant changes noted.  ------------------------------------------------------------------------  Confirmed on  1/23/2018 - 14:35:49 by Jose Lucia M.D.    < end of copied text >      ASSESSMENT/PLAN: 	72y Female w/ PMH HTN, CKD, Hyperlipidemia, DM-II, CAD s/p 3V CABG  (LIMA to LAD, SVG to OM, SVG to PDA) at [facility] 2014, s/p CORNELIA TO RI 11/17/17, NSTEMI 12/2017 s/p C on 12/5 and CORNELIA to D Ramus, TTE at that time revealed grossly moderate to severe LV dysfunction with hypokinesis of the inferior, lateral and inferolateral with an EF of 36% admitted with acute on chronic systolic CHF     --C/W IV  Lasix.  Keep O>I monitor K & cr levels --> change to PO in AM  --mildly elevated trops noted in setting of CKD & CHF exacerbation  - no need for urgent repeat cath at this time  --CT chest noted above  --Repeat TTE unchanged from prior. EF 32%  --Cont. DAPT for recent CORNELIA  --F/U NST final report--s/p resting images today  --FEVER today--check RVP    Juliane Fitch PA-C  [facility]  [address], [address]   [place] [address]  office [phone]  pager [phone]

## 2018-01-30 NOTE — PROGRESS NOTE ADULT - ASSESSMENT
1.	CHF decompensation, EF 32%  2.	MERVIN: renal function worsening likely hyperglycemia on lasix, FEurea<35%,  improved to baseline  3.	CKD 3, baseline 1.5-1.8  4.	CAD. cardio following  5.	Acidosis improved   6.	Hypokalemia    7.	fever    PLAN:   1.	diuresing per cardio  2.	Monitor BMP.  3.	check viral panel   1.	f/u cardio

## 2018-01-30 NOTE — PROGRESS NOTE ADULT - SUBJECTIVE AND OBJECTIVE BOX
Dr. Muhammad (Nephrology)  Office [phone]  Cell [phone]  [facility]  Cell [phone]      Patient is a 72y old  Female who presents with a chief complaint of SOB (25 Jan 2018 15:14)      Patient seen and examined at bedside. daughter at bedside. No chest pain/sob, but feeling feverish, + sneezing and stuffed nose    VITALS:  T(F): 98.6 (01-30-18 @ 05:38), Max: 99.6 (01-30-18 @ 03:15)  HR: 90 (01-30-18 @ 05:38)  BP: 103/65 (01-30-18 @ 05:38)  RR: 16 (01-30-18 @ 05:38)  SpO2: 97% (01-30-18 @ 05:38)  Wt(kg): --    01-29 @ 07:01  -  01-30 @ 07:00  --------------------------------------------------------  IN: 360 mL / OUT: 0 mL / NET: 360 mL    01-30 @ 07:01  -  01-30 @ 11:20  --------------------------------------------------------  IN: 320 mL / OUT: 0 mL / NET: 320 mL          PHYSICAL EXAM:  Constitutional: NAD  Neck: No JVD  Respiratory: CTAB, no wheezes, rales or rhonchi  Cardiovascular: S1, S2, RRR  Gastrointestinal: BS+, soft, NT/ND  Extremities: No peripheral edema    Hospital Medications:   MEDICATIONS  (STANDING):  aspirin enteric coated 81 milliGRAM(s) Oral daily  atorvastatin 40 milliGRAM(s) Oral at bedtime  dextrose 5%. 1000 milliLiter(s) (50 mL/Hr) IV Continuous <Continuous>  dextrose 50% Injectable 12.5 Gram(s) IV Push once  dextrose 50% Injectable 25 Gram(s) IV Push once  dextrose 50% Injectable 25 Gram(s) IV Push once  famotidine    Tablet 20 milliGRAM(s) Oral daily  heparin  Injectable 5000 Unit(s) SubCutaneous every 12 hours  insulin glargine Injectable (LANTUS) 20 Unit(s) SubCutaneous every morning  insulin glargine Injectable (LANTUS) 68 Unit(s) SubCutaneous at bedtime  insulin lispro (HumaLOG) corrective regimen sliding scale   SubCutaneous three times a day before meals  insulin lispro (HumaLOG) corrective regimen sliding scale   SubCutaneous at bedtime  insulin lispro Injectable (HumaLOG) 24 Unit(s) SubCutaneous three times a day before meals  levothyroxine 50 MICROGram(s) Oral daily  metoprolol succinate ER 12.5 milliGRAM(s) Oral daily  montelukast 10 milliGRAM(s) Oral at bedtime  sodium bicarbonate 650 milliGRAM(s) Oral three times a day  sodium chloride 0.9% lock flush 3 milliLiter(s) IV Push every 8 hours  ticagrelor 90 milliGRAM(s) Oral two times a day      LABS:  01-30    139  |  101  |  30<H>  ----------------------------<  178<H>  4.0   |  24  |  1.53<H>    Ca    8.8      30 Jan 2018 09:16  Mg     2.0     01-30      Creatinine Trend: 1.53 <--, 1.49 <--, 1.58 <--, 1.60 <--, 1.77 <--, 1.75 <--, 2.18 <--                                9.8    8.26  )-----------( 292      ( 30 Jan 2018 09:19 )             30.7     Urine Studies:  Urinalysis - [01-23-18 @ 20:09]      Color PLYEL / Appearance CLEAR / SG 1.015 / pH 5.5      Gluc 250 / Ketone NEGATIVE  / Bili NEGATIVE / Urobili NORMAL       Blood NEGATIVE / Protein 30 / Leuk Est TRACE / Nitrite NEGATIVE      RBC 0-2 / WBC 2-5 / Hyaline  / Gran  / Sq Epi OCC / Non Sq Epi  / Bacteria     Urine Creatinine 90.38      [01-23-18 @ 20:09]  Urine Urea Nitrogen 765.1      [01-23-18 @ 20:09]    Iron 40, TIBC 377, %sat --      [01-20-18 @ 06:06]  Ferritin 38.35      [01-20-18 @ 06:06]  HbA1c 9.1      [11-25-17 @ 06:30]  TSH 0.55      [01-19-18 @ 06:45]  Lipid: chol 130, , HDL 30, LDL 70      [01-19-18 @ 06:45]        RADIOLOGY & ADDITIONAL STUDIES:

## 2018-01-30 NOTE — PROGRESS NOTE ADULT - ASSESSMENT
Problem/Plan - 1:  ·  Problem: CHF exacerbation.  Plan: telemonitor   strict I & O's, daily wts  Fluid restriction  diuresis as per cards  further edwards as per cards    Problem/Plan - 2:  ·  Problem: Diabetes mellitus, type 2.  Plan: monitor fs   basal/bolus   endocrine following    Problem/Plan - 3:  ·  Problem: Hyperlipidemia.  Plan: cw statin    Problem/Plan - 4:  ·  Problem: Hypothyroidism.  Plan: cw levothyroxine.

## 2018-01-30 NOTE — PROGRESS NOTE ADULT - SUBJECTIVE AND OBJECTIVE BOX
Chief complaint  Patient is a 72y old  Female who presents with a chief complaint of SOB (25 Jan 2018 15:14)   Review of systems  Patient in bed, looks comfortable, no fever, no hypoglycemia.    Labs and Fingersticks  CAPILLARY BLOOD GLUCOSE      POCT Blood Glucose.: 188 mg/dL (30 Jan 2018 13:05)  POCT Blood Glucose.: 175 mg/dL (30 Jan 2018 08:46)  POCT Blood Glucose.: 317 mg/dL (29 Jan 2018 21:42)  POCT Blood Glucose.: 234 mg/dL (29 Jan 2018 17:35)          Calcium, Total Serum: 8.8 (01-30 @ 09:16)  Calcium, Total Serum: 9.0 (01-29 @ 07:51)          01-30    139  |  101  |  30<H>  ----------------------------<  178<H>  4.0   |  24  |  1.53<H>    Ca    8.8      30 Jan 2018 09:16  Mg     2.0     01-30                          9.8    8.26  )-----------( 292      ( 30 Jan 2018 09:19 )             30.7     Medications  MEDICATIONS  (STANDING):  aspirin enteric coated 81 milliGRAM(s) Oral daily  atorvastatin 40 milliGRAM(s) Oral at bedtime  dextrose 5%. 1000 milliLiter(s) (50 mL/Hr) IV Continuous <Continuous>  dextrose 50% Injectable 12.5 Gram(s) IV Push once  dextrose 50% Injectable 25 Gram(s) IV Push once  dextrose 50% Injectable 25 Gram(s) IV Push once  famotidine    Tablet 20 milliGRAM(s) Oral daily  heparin  Injectable 5000 Unit(s) SubCutaneous every 12 hours  insulin glargine Injectable (LANTUS) 20 Unit(s) SubCutaneous every morning  insulin glargine Injectable (LANTUS) 68 Unit(s) SubCutaneous at bedtime  insulin lispro (HumaLOG) corrective regimen sliding scale   SubCutaneous three times a day before meals  insulin lispro (HumaLOG) corrective regimen sliding scale   SubCutaneous at bedtime  insulin lispro Injectable (HumaLOG) 24 Unit(s) SubCutaneous three times a day before meals  levothyroxine 50 MICROGram(s) Oral daily  metoprolol succinate ER 12.5 milliGRAM(s) Oral daily  montelukast 10 milliGRAM(s) Oral at bedtime  sodium bicarbonate 650 milliGRAM(s) Oral three times a day  sodium chloride 0.9% lock flush 3 milliLiter(s) IV Push every 8 hours  ticagrelor 90 milliGRAM(s) Oral two times a day      Physical Exam  General: Patient comfortable in bed  Vital Signs Last 12 Hrs  T(F): 100.7 (01-30-18 @ 14:53), Max: 100.7 (01-30-18 @ 14:53)  HR: 95 (01-30-18 @ 13:47) (90 - 95)  BP: 135/89 (01-30-18 @ 13:47) (103/65 - 135/89)  BP(mean): --  RR: 16 (01-30-18 @ 13:47) (16 - 16)  SpO2: 100% (01-30-18 @ 13:47) (97% - 100%)  Neck: No palpable thyroid nodules.  CVS: S1S2, No murmurs  Respiratory: No wheezing, no crepitations  GI: Abdomen soft, bowel sounds positive  Musculoskeletal:  edema lower extremities.   Skin: No skin rashes, no ecchymosis    Diagnostics

## 2018-01-31 LAB
B PERT DNA SPEC QL NAA+PROBE: SIGNIFICANT CHANGE UP
BASOPHILS # BLD AUTO: 0.04 K/UL — SIGNIFICANT CHANGE UP (ref 0–0.2)
BASOPHILS NFR BLD AUTO: 0.5 % — SIGNIFICANT CHANGE UP (ref 0–2)
BUN SERPL-MCNC: 28 MG/DL — HIGH (ref 7–23)
C PNEUM DNA SPEC QL NAA+PROBE: NOT DETECTED — SIGNIFICANT CHANGE UP
CALCIUM SERPL-MCNC: 8.9 MG/DL — SIGNIFICANT CHANGE UP (ref 8.4–10.5)
CHLORIDE SERPL-SCNC: 100 MMOL/L — SIGNIFICANT CHANGE UP (ref 98–107)
CO2 SERPL-SCNC: 22 MMOL/L — SIGNIFICANT CHANGE UP (ref 22–31)
CREAT SERPL-MCNC: 1.56 MG/DL — HIGH (ref 0.5–1.3)
EOSINOPHIL # BLD AUTO: 0.38 K/UL — SIGNIFICANT CHANGE UP (ref 0–0.5)
EOSINOPHIL NFR BLD AUTO: 4.3 % — SIGNIFICANT CHANGE UP (ref 0–6)
FLUAV H1 2009 PAND RNA SPEC QL NAA+PROBE: POSITIVE — HIGH
FLUAV H1 RNA SPEC QL NAA+PROBE: NOT DETECTED — SIGNIFICANT CHANGE UP
FLUAV H3 RNA SPEC QL NAA+PROBE: NOT DETECTED — SIGNIFICANT CHANGE UP
FLUBV RNA SPEC QL NAA+PROBE: NOT DETECTED — SIGNIFICANT CHANGE UP
GLUCOSE BLDC GLUCOMTR-MCNC: 101 MG/DL — HIGH (ref 70–99)
GLUCOSE BLDC GLUCOMTR-MCNC: 173 MG/DL — HIGH (ref 70–99)
GLUCOSE BLDC GLUCOMTR-MCNC: 205 MG/DL — HIGH (ref 70–99)
GLUCOSE BLDC GLUCOMTR-MCNC: 207 MG/DL — HIGH (ref 70–99)
GLUCOSE SERPL-MCNC: 122 MG/DL — HIGH (ref 70–99)
HADV DNA SPEC QL NAA+PROBE: NOT DETECTED — SIGNIFICANT CHANGE UP
HCOV 229E RNA SPEC QL NAA+PROBE: NOT DETECTED — SIGNIFICANT CHANGE UP
HCOV HKU1 RNA SPEC QL NAA+PROBE: NOT DETECTED — SIGNIFICANT CHANGE UP
HCOV NL63 RNA SPEC QL NAA+PROBE: NOT DETECTED — SIGNIFICANT CHANGE UP
HCOV OC43 RNA SPEC QL NAA+PROBE: NOT DETECTED — SIGNIFICANT CHANGE UP
HCT VFR BLD CALC: 31.4 % — LOW (ref 34.5–45)
HGB BLD-MCNC: 10.1 G/DL — LOW (ref 11.5–15.5)
HMPV RNA SPEC QL NAA+PROBE: NOT DETECTED — SIGNIFICANT CHANGE UP
HPIV1 RNA SPEC QL NAA+PROBE: NOT DETECTED — SIGNIFICANT CHANGE UP
HPIV2 RNA SPEC QL NAA+PROBE: NOT DETECTED — SIGNIFICANT CHANGE UP
HPIV3 RNA SPEC QL NAA+PROBE: NOT DETECTED — SIGNIFICANT CHANGE UP
HPIV4 RNA SPEC QL NAA+PROBE: NOT DETECTED — SIGNIFICANT CHANGE UP
IMM GRANULOCYTES # BLD AUTO: 0.08 # — SIGNIFICANT CHANGE UP
IMM GRANULOCYTES NFR BLD AUTO: 0.9 % — SIGNIFICANT CHANGE UP (ref 0–1.5)
LYMPHOCYTES # BLD AUTO: 2.17 K/UL — SIGNIFICANT CHANGE UP (ref 1–3.3)
LYMPHOCYTES # BLD AUTO: 24.8 % — SIGNIFICANT CHANGE UP (ref 13–44)
M PNEUMO DNA SPEC QL NAA+PROBE: NOT DETECTED — SIGNIFICANT CHANGE UP
MAGNESIUM SERPL-MCNC: 1.9 MG/DL — SIGNIFICANT CHANGE UP (ref 1.6–2.6)
MCHC RBC-ENTMCNC: 27.3 PG — SIGNIFICANT CHANGE UP (ref 27–34)
MCHC RBC-ENTMCNC: 32.2 % — SIGNIFICANT CHANGE UP (ref 32–36)
MCV RBC AUTO: 84.9 FL — SIGNIFICANT CHANGE UP (ref 80–100)
MONOCYTES # BLD AUTO: 1.17 K/UL — HIGH (ref 0–0.9)
MONOCYTES NFR BLD AUTO: 13.4 % — SIGNIFICANT CHANGE UP (ref 2–14)
NEUTROPHILS # BLD AUTO: 4.92 K/UL — SIGNIFICANT CHANGE UP (ref 1.8–7.4)
NEUTROPHILS NFR BLD AUTO: 56.1 % — SIGNIFICANT CHANGE UP (ref 43–77)
NRBC # FLD: 0 — SIGNIFICANT CHANGE UP
PLATELET # BLD AUTO: 282 K/UL — SIGNIFICANT CHANGE UP (ref 150–400)
PMV BLD: 9.2 FL — SIGNIFICANT CHANGE UP (ref 7–13)
POTASSIUM SERPL-MCNC: 3.8 MMOL/L — SIGNIFICANT CHANGE UP (ref 3.5–5.3)
POTASSIUM SERPL-SCNC: 3.8 MMOL/L — SIGNIFICANT CHANGE UP (ref 3.5–5.3)
RBC # BLD: 3.7 M/UL — LOW (ref 3.8–5.2)
RBC # FLD: 14.8 % — HIGH (ref 10.3–14.5)
RSV RNA SPEC QL NAA+PROBE: NOT DETECTED — SIGNIFICANT CHANGE UP
RV+EV RNA SPEC QL NAA+PROBE: NOT DETECTED — SIGNIFICANT CHANGE UP
SODIUM SERPL-SCNC: 139 MMOL/L — SIGNIFICANT CHANGE UP (ref 135–145)
SPECIMEN SOURCE: SIGNIFICANT CHANGE UP
SPECIMEN SOURCE: SIGNIFICANT CHANGE UP
WBC # BLD: 8.76 K/UL — SIGNIFICANT CHANGE UP (ref 3.8–10.5)
WBC # FLD AUTO: 8.76 K/UL — SIGNIFICANT CHANGE UP (ref 3.8–10.5)

## 2018-01-31 PROCEDURE — 99232 SBSQ HOSP IP/OBS MODERATE 35: CPT

## 2018-01-31 PROCEDURE — 71250 CT THORAX DX C-: CPT | Mod: 26

## 2018-01-31 RX ORDER — IPRATROPIUM/ALBUTEROL SULFATE 18-103MCG
3 AEROSOL WITH ADAPTER (GRAM) INHALATION ONCE
Qty: 0 | Refills: 0 | Status: COMPLETED | OUTPATIENT
Start: 2018-01-31 | End: 2018-01-31

## 2018-01-31 RX ORDER — ASCORBIC ACID 60 MG
500 TABLET,CHEWABLE ORAL DAILY
Qty: 0 | Refills: 0 | Status: COMPLETED | OUTPATIENT
Start: 2018-01-31 | End: 2018-02-02

## 2018-01-31 RX ADMIN — Medication 4: at 18:29

## 2018-01-31 RX ADMIN — TICAGRELOR 90 MILLIGRAM(S): 90 TABLET ORAL at 05:50

## 2018-01-31 RX ADMIN — Medication 650 MILLIGRAM(S): at 05:50

## 2018-01-31 RX ADMIN — Medication 2: at 13:11

## 2018-01-31 RX ADMIN — SODIUM CHLORIDE 3 MILLILITER(S): 9 INJECTION INTRAMUSCULAR; INTRAVENOUS; SUBCUTANEOUS at 13:14

## 2018-01-31 RX ADMIN — Medication 12.5 MILLIGRAM(S): at 05:49

## 2018-01-31 RX ADMIN — Medication 650 MILLIGRAM(S): at 13:11

## 2018-01-31 RX ADMIN — Medication 81 MILLIGRAM(S): at 13:11

## 2018-01-31 RX ADMIN — Medication 100 MILLIGRAM(S): at 19:41

## 2018-01-31 RX ADMIN — Medication 500 MILLIGRAM(S): at 13:13

## 2018-01-31 RX ADMIN — Medication 100 MILLIGRAM(S): at 05:50

## 2018-01-31 RX ADMIN — Medication 24 UNIT(S): at 09:24

## 2018-01-31 RX ADMIN — SODIUM CHLORIDE 3 MILLILITER(S): 9 INJECTION INTRAMUSCULAR; INTRAVENOUS; SUBCUTANEOUS at 21:23

## 2018-01-31 RX ADMIN — Medication 50 MICROGRAM(S): at 05:50

## 2018-01-31 RX ADMIN — HEPARIN SODIUM 5000 UNIT(S): 5000 INJECTION INTRAVENOUS; SUBCUTANEOUS at 05:50

## 2018-01-31 RX ADMIN — Medication 650 MILLIGRAM(S): at 21:23

## 2018-01-31 RX ADMIN — Medication 24 UNIT(S): at 13:11

## 2018-01-31 RX ADMIN — FAMOTIDINE 20 MILLIGRAM(S): 10 INJECTION INTRAVENOUS at 13:11

## 2018-01-31 RX ADMIN — Medication 24 UNIT(S): at 18:30

## 2018-01-31 RX ADMIN — BENZOCAINE AND MENTHOL 1 LOZENGE: 5; 1 LIQUID ORAL at 18:30

## 2018-01-31 RX ADMIN — Medication 3 MILLILITER(S): at 01:11

## 2018-01-31 RX ADMIN — TICAGRELOR 90 MILLIGRAM(S): 90 TABLET ORAL at 17:03

## 2018-01-31 RX ADMIN — SODIUM CHLORIDE 3 MILLILITER(S): 9 INJECTION INTRAMUSCULAR; INTRAVENOUS; SUBCUTANEOUS at 05:49

## 2018-01-31 RX ADMIN — ATORVASTATIN CALCIUM 40 MILLIGRAM(S): 80 TABLET, FILM COATED ORAL at 21:23

## 2018-01-31 RX ADMIN — INSULIN GLARGINE 68 UNIT(S): 100 INJECTION, SOLUTION SUBCUTANEOUS at 22:38

## 2018-01-31 RX ADMIN — BENZOCAINE AND MENTHOL 1 LOZENGE: 5; 1 LIQUID ORAL at 05:50

## 2018-01-31 RX ADMIN — INSULIN GLARGINE 20 UNIT(S): 100 INJECTION, SOLUTION SUBCUTANEOUS at 09:24

## 2018-01-31 RX ADMIN — HEPARIN SODIUM 5000 UNIT(S): 5000 INJECTION INTRAVENOUS; SUBCUTANEOUS at 17:03

## 2018-01-31 RX ADMIN — MONTELUKAST 10 MILLIGRAM(S): 4 TABLET, CHEWABLE ORAL at 21:23

## 2018-01-31 NOTE — CONSULT NOTE ADULT - PROBLEM SELECTOR RECOMMENDATION 2
continue lasix  f/u cardio
monitor labs, Renal FU
-as above  -if flu, start tamiflu
seems to be doing better: the pleural effusions have resolved:

## 2018-01-31 NOTE — CONSULT NOTE ADULT - PROBLEM SELECTOR RECOMMENDATION 3
monitor blood glucose
check iron panel  monitor bmp
On medications, monitored and followed up by primary/cardiology team

## 2018-01-31 NOTE — PROGRESS NOTE ADULT - SUBJECTIVE AND OBJECTIVE BOX
Subjective:  c/o feeling "fever", congested nose/SOB      MEDICATIONS  (STANDING):  ascorbic acid 500 milliGRAM(s) Oral daily  aspirin enteric coated 81 milliGRAM(s) Oral daily  atorvastatin 40 milliGRAM(s) Oral at bedtime  dextrose 5%. 1000 milliLiter(s) (50 mL/Hr) IV Continuous <Continuous>  dextrose 50% Injectable 12.5 Gram(s) IV Push once  dextrose 50% Injectable 25 Gram(s) IV Push once  dextrose 50% Injectable 25 Gram(s) IV Push once  famotidine    Tablet 20 milliGRAM(s) Oral daily  heparin  Injectable 5000 Unit(s) SubCutaneous every 12 hours  insulin glargine Injectable (LANTUS) 20 Unit(s) SubCutaneous every morning  insulin glargine Injectable (LANTUS) 68 Unit(s) SubCutaneous at bedtime  insulin lispro (HumaLOG) corrective regimen sliding scale   SubCutaneous three times a day before meals  insulin lispro (HumaLOG) corrective regimen sliding scale   SubCutaneous at bedtime  insulin lispro Injectable (HumaLOG) 24 Unit(s) SubCutaneous three times a day before meals  levothyroxine 50 MICROGram(s) Oral daily  metoprolol succinate ER 12.5 milliGRAM(s) Oral daily  montelukast 10 milliGRAM(s) Oral at bedtime  sodium bicarbonate 650 milliGRAM(s) Oral three times a day  sodium chloride 0.9% lock flush 3 milliLiter(s) IV Push every 8 hours  ticagrelor 90 milliGRAM(s) Oral two times a day    MEDICATIONS  (PRN):  acetaminophen   Tablet 650 milliGRAM(s) Oral every 6 hours PRN For Temp greater than 38 C (100.4 F)  acetaminophen   Tablet. 650 milliGRAM(s) Oral every 6 hours PRN Mild, moderate and severe pain  benzocaine 15 mG/menthol 3.6 mG Lozenge 1 Lozenge Oral every 6 hours PRN Sore Throat  dextrose Gel 1 Dose(s) Oral once PRN Blood Glucose LESS THAN 70 milliGRAM(s)/deciliter  glucagon  Injectable 1 milliGRAM(s) IntraMuscular once PRN Glucose LESS THAN 70 milligrams/deciliter  guaiFENesin    Syrup 100 milliGRAM(s) Oral every 6 hours PRN Cough  sodium chloride 0.65% Nasal 1 Spray(s) Both Nostrils three times a day PRN Nasal Congestion      LABS:                        10.1   8.76  )-----------( 282      ( 31 Jan 2018 05:00 )             31.4     139  |  100  |  28<H>  ----------------------------<  122<H>  3.8   |  22  |  1.56<H>    Ca    8.9      31 Jan 2018 05:00  Mg     1.9     01-31    Creatinine Trend: 1.56<--, 1.53<--, 1.49<--, 1.58<--, 1.60<--, 1.77<--     PHYSICAL EXAM  Vital Signs Last 24 Hrs  T(C): 36.8 (31 Jan 2018 10:32), Max: 38.2 (30 Jan 2018 14:53)  T(F): 98.2 (31 Jan 2018 10:32), Max: 100.7 (30 Jan 2018 14:53)  HR: 95 (31 Jan 2018 05:00) (93 - 100)  BP: 116/67 (31 Jan 2018 05:00) (116/67 - 135/89)  RR: 18 (31 Jan 2018 05:00) (16 - 20)  SpO2: 100% (31 Jan 2018 05:00) (97% - 100%)    Cardiovascular: S1S2 RRR  Respiratory: CTA BL with decreased breath sounds B/L LLs  Gastrointestinal:  Soft, Non-tender, + BS	  Extremities No edema, cyanosis or clubbing  B/L LE's     DIAGNOSTIC DATA:     TELEMETRY: SR 	       < from: Cardiac Cath Lab - Adult (10.13.17 @ 10:40) >  VENTRICLES: No left ventriculogram was performed.  CORONARY VESSELS: The coronary circulation is right dominant.  LM:   --  LM: Normal.  LAD:   --  Proximal LAD: There was a diffuse 60 % stenosis.  --  Mid LAD: There was a 100 % stenosis with the distal vessel being filled  via LIMA-LAD.  CX:   --  Circumflex: The vessel was small sized. Angiography showed mild  atherosclerosis withno flow limiting lesions.  --  OM1: Angiography showed mild atherosclerosis with no flow limiting  lesions.  RI:   --  Ostial ramus intermedius: There was a tubular 80 % stenosis.  There was PARUL grade 3 flow through the vessel (brisk flow).  RCA:   --  Proximal RCA: Angiography showed mild atherosclerosis with no  flow limiting lesions.  --  Mid RCA: There was a 100 % stenosis with the distal vessel being filled  via collaterals. This lesion is a chronic total occlusion.  GRAFTS:   --  Graft to the LAD: The graft was a LIMA. Graft angiography  showed minor luminal irregularities. Distal vessel angiography showed  minor luminal irregularities.  AORTA: Ascending aorta: Normal. No additional grafts visualized in  aortogram.  COMPLICATIONS: There were no complications.  DIAGNOSTIC RECOMMENDATIONS: Coronary angiogram demonstrates patent  LIMA-LAD, closed SVG grafts, and a severe stenosis in the ostial Ramus.  Will perform staged PCI to RI when Cr stable and renally optimized.  Prepared and signed by  Corie Ga M.D.  Signed 10/17/2017 13:49:15    < end of copied text >    < from: Cardiac Cath Lab - Adult (11.17.17 @ 11:48) >  PROCEDURE:  --  Intervention on ramus intermedius: drug-eluting stent.    VENTRICLES: No LV gram was performed; however, a recent echocardiogram  demonstrated normal global and regional LV function.  CORONARY VESSELS: The coronary circulation is right dominant.  LM:   --  LM: Angiography showed minor luminal irregularities with no flow  limiting lesions.  LAD:   --  Ostial LAD: There was a 100 % stenosis.  CX:   --  Circumflex: This vessel was not injected, but was visualized  during a prior cardiac catheterization.  RI:   --  Ramus intermedius: There was a 95 % stenosis.  RCA:   --  RCA: This vessel was not injected.  COMPLICATIONS: There were no complications.  DIAGNOSTIC RECOMMENDATIONS: The patient should continue with the present  medications. will add plavix 75mg po once a day/ will add ECASA 325mg po  once day.  Prepared and signed by  Roberta Quick M.D.  Signed 11/17/2017 13:16:01    < end of copied text >    < from: TTE with Doppler (w/Cont) (11.25.17 @ 12:33) >  CONCLUSIONS:  1. Mitral annular calcification, otherwise normal mitral  valve. Mild mitral regurgitation.  2. Normal left ventricular internal dimensions and wall  thicknesses.  3. Endocardium not well visualized; grossly moderate to  severe segmental left ventricularsystolic dysfunction.  Hypokinesis of the inferior, lateral, and inferolateral  walls.  Endocardial visualization enhanced with intravenous  injection of echo contrast (Definity).  No LV thrombus  seen.  4. The right ventricle is not well visualized;grossly  normal right ventricular systolic function.  ------------------------------------------------------------------------  Confirmed on  11/25/2017 - 14:44:41 by Jose Lucia M.D.    < end of copied text >    < from: Cardiac Cath Lab - Adult (12.05.17 @ 12:48) >  VENTRICLES: No LV gram was performed; however, a recent echocardiogram  demonstrated an EF of 36 %.  CORONARY VESSELS: The coronary circulation is right dominant.  LM:   --  Distal left main: Angiography showed minor luminal irregularities  with no flow limiting lesions.  LAD:   --  Mid LAD: There was a 100 % stenosis with the distal vessel being  filled via LIMA-LAD.  CX:   --  Proximal circumflex: There was a tubular 20 % stenosis.  --  Mid circumflex: Angiography showed minor luminal irregularities with no  flow limiting lesions.  --  Distal circumflex: Angiography showed minor luminal irregularities with  no flow limiting lesions.  --  OM1: The vessel was small sized. There was a discrete 60 % stenosis.  RI:   --  Ostial ramus intermedius: Angiography showed minor luminal  irregularities with no flow limiting lesions. There was no significant  restenosis in RI stent.  --  Proximal ramus intermedius: Angiography showed minor luminal  irregularities with no flow limiting lesions.  --  Mid ramus intermedius: There was a tubular 80 % stenosis. The lesion  was associated with a small filling defect consistent with thrombus.  RCA:   --  Mid RCA: There was a 100 % stenosis with the distal vessel being  filled via collaterals from the LAD.  GRAFTS:   --  Graft to the LAD: The graft was a LIMA. Graft angiography  showed minor luminal irregularities. Distal vessel angiography showed  minor luminalirregularities.  COMPLICATIONS: There were no complications.  DIAGNOSTIC RECOMMENDATIONS: Coronary angiogram demonstrates a severe  stenosis in the ramus intermedius that is the cause of the patient's  clinical presentation. Will therefore perform PCI to the RI.  INTERVENTIONAL RECOMMENDATIONS: S/p successful CORNELIA to the Ramus  Intermedius. The patient should continue with ASA and Brilinta for at  least 12 months.  Prepared and signed by  Corie Ga M.D.  Signed 12/06/2017 17:06:30    < from: CT Chest No Cont (01.20.18 @ 09:55) >  IMPRESSION:   Mild pulmonary edema with small simple bilateral pleural effusions, right   greater than left, with no evidence of loculation.    < end of copied text >    < from: TTE with Doppler (w/Cont) (01.23.18 @ 12:31) >  CONCLUSIONS:  1. Mitral annular calcification, otherwise normal mitral  valve. Mild-moderate mitral regurgitation.  2. Calcified trileaflet aortic valve with normal opening.  Mild aortic regurgitation.  3. Normal left ventricular internal dimensions and wall  thicknesses.  4. Endocardium not well visualized; grossly severe global  left ventricular systolic dysfunction.  Endocardial  visualization enhanced with intravenous injection of echo  contrast (Definity).  5. The right ventricle is not well visualized; grossly  normal right ventricular systolic function.  *** Compared with echocardiogram of 11/25/2017, no  significant changes noted.  ------------------------------------------------------------------------  Confirmed on  1/23/2018 - 14:35:49 by Jose Lucia M.D.    < end of copied text >    < from: Nuclear Stress Test-Pharmacologic (01.28.18 @ 12:31) >  IMPRESSIONS:Abnormal Study  * Myocardial Perfusion SPECT results are abnormal.  * There is a medium sized, mild to moderate defect in  anterior wall that is reversible, suggestive of  ischemia.There are large, moderate to severe defects in  inferolateral, lateral walls that are fixed, suggestive of  infarct.  * Post-stress gated wall motion analysis was performed  (LVEF = 33 %;LVEDV = 116 ml.), revealing severe overall  hypokinesis. There was focal inferiolateral akinesis and  severe lateral hypokinesis with absence of systolic  thickening. The best motion was in the septum.  *** Compared with Nuclear/Stress test of 11/5/2014, the  observed defect are now more extensive, suggesting  progression of disease. Prior LVEF was 39% with EDV 77 mL.  ------------------------------------------------------------------------  Revised on  1/29/2018 - 16:44:19 by Lorenzo Covarrubias M.D.  Confirmed on  1/30/2018 - 15:45:44 by José Hansen M.D.  < end of copied text >        ASSESSMENT/PLAN: 	72y Female w/ PMH HTN, CKD, Hyperlipidemia, DM-II, CAD s/p 3V CABG  (LIMA to LAD, SVG to OM, SVG to PDA) at [facility] 2014, s/p CORNELIA TO RI 11/17/17, NSTEMI 12/2017 s/p C on 12/5 and CORNELIA to [facility], TTE at that time revealed grossly moderate to severe LV dysfunction with hypokinesis of the inferior, lateral and inferolateral with an EF of 36% admitted with acute on chronic systolic CHF, s/p IV diureses, s/p NST as above, now s/p fever 1/30/18--RVP negative     --Cont PO Lasix, patient is Euvolemic  --mildly elevated trops noted in setting of CKD & CHF exacerbation  - no need for urgent repeat cath at this time  --Repeat TTE unchanged from prior. EF 32%  --Cont. DAPT for recent CORNELIA  --FEVER today--RVP negative, f/u Blood Cx, CXR WNL, check CT CHEST  --Pulm Consult  --NST noted above, will d/w patient and family regarding further cardiac work up once infectious issues resolve    Juliane Fitch PA-C  [facility]  [address], [address]   [place] [address]  office [phone]  pager [phone]

## 2018-01-31 NOTE — PROGRESS NOTE ADULT - SUBJECTIVE AND OBJECTIVE BOX
Chief complaint  Patient is a 72y old  Female who presents with a chief complaint of SOB (25 Jan 2018 15:14)   Review of systems  Patient in bed, looks comfortable, no fever, no hypoglycemia.    Labs and Fingersticks  CAPILLARY BLOOD GLUCOSE      POCT Blood Glucose.: 173 mg/dL (31 Jan 2018 13:03)  POCT Blood Glucose.: 101 mg/dL (31 Jan 2018 08:43)  POCT Blood Glucose.: 260 mg/dL (30 Jan 2018 21:47)  POCT Blood Glucose.: 235 mg/dL (30 Jan 2018 17:22)          Calcium, Total Serum: 8.9 (01-31 @ 05:00)  Calcium, Total Serum: 8.8 (01-30 @ 09:16)          01-31    139  |  100  |  28<H>  ----------------------------<  122<H>  3.8   |  22  |  1.56<H>    Ca    8.9      31 Jan 2018 05:00  Mg     1.9     01-31                          10.1   8.76  )-----------( 282      ( 31 Jan 2018 05:00 )             31.4     Medications  MEDICATIONS  (STANDING):  ascorbic acid 500 milliGRAM(s) Oral daily  aspirin enteric coated 81 milliGRAM(s) Oral daily  atorvastatin 40 milliGRAM(s) Oral at bedtime  dextrose 5%. 1000 milliLiter(s) (50 mL/Hr) IV Continuous <Continuous>  dextrose 50% Injectable 12.5 Gram(s) IV Push once  dextrose 50% Injectable 25 Gram(s) IV Push once  dextrose 50% Injectable 25 Gram(s) IV Push once  famotidine    Tablet 20 milliGRAM(s) Oral daily  heparin  Injectable 5000 Unit(s) SubCutaneous every 12 hours  insulin glargine Injectable (LANTUS) 20 Unit(s) SubCutaneous every morning  insulin glargine Injectable (LANTUS) 68 Unit(s) SubCutaneous at bedtime  insulin lispro (HumaLOG) corrective regimen sliding scale   SubCutaneous three times a day before meals  insulin lispro (HumaLOG) corrective regimen sliding scale   SubCutaneous at bedtime  insulin lispro Injectable (HumaLOG) 24 Unit(s) SubCutaneous three times a day before meals  levothyroxine 50 MICROGram(s) Oral daily  metoprolol succinate ER 12.5 milliGRAM(s) Oral daily  montelukast 10 milliGRAM(s) Oral at bedtime  sodium bicarbonate 650 milliGRAM(s) Oral three times a day  sodium chloride 0.9% lock flush 3 milliLiter(s) IV Push every 8 hours  ticagrelor 90 milliGRAM(s) Oral two times a day      Physical Exam  General: Patient comfortable in bed  Vital Signs Last 12 Hrs  T(F): 98.1 (01-31-18 @ 13:36), Max: 98.7 (01-31-18 @ 05:00)  HR: 96 (01-31-18 @ 13:36) (95 - 96)  BP: 118/85 (01-31-18 @ 13:36) (116/67 - 118/85)  BP(mean): --  RR: 18 (01-31-18 @ 13:36) (18 - 18)  SpO2: 100% (01-31-18 @ 13:36) (100% - 100%)  Neck: No palpable thyroid nodules.  CVS: S1S2, No murmurs  Respiratory: No wheezing, no crepitations  GI: Abdomen soft, bowel sounds positive  Musculoskeletal:  edema lower extremities.   Skin: No skin rashes, no ecchymosis    Diagnostics

## 2018-01-31 NOTE — PROGRESS NOTE ADULT - SUBJECTIVE AND OBJECTIVE BOX
SELVIN CLAIRE 72y [id number]    Patient is a 72y old  Female who presents with a chief complaint of SOB (2018 15:14)      Follow Up/CC:  fever    Interval History/ROS: c/o nasal congestion and cough    Allergies    No Known Allergies    Intolerances        ANTIMICROBIALS:      MEDICATIONS  (STANDING):  ascorbic acid 500 milliGRAM(s) Oral daily  aspirin enteric coated 81 milliGRAM(s) Oral daily  atorvastatin 40 milliGRAM(s) Oral at bedtime  dextrose 5%. 1000 milliLiter(s) (50 mL/Hr) IV Continuous <Continuous>  dextrose 50% Injectable 12.5 Gram(s) IV Push once  dextrose 50% Injectable 25 Gram(s) IV Push once  dextrose 50% Injectable 25 Gram(s) IV Push once  famotidine    Tablet 20 milliGRAM(s) Oral daily  heparin  Injectable 5000 Unit(s) SubCutaneous every 12 hours  insulin glargine Injectable (LANTUS) 20 Unit(s) SubCutaneous every morning  insulin glargine Injectable (LANTUS) 68 Unit(s) SubCutaneous at bedtime  insulin lispro (HumaLOG) corrective regimen sliding scale   SubCutaneous three times a day before meals  insulin lispro (HumaLOG) corrective regimen sliding scale   SubCutaneous at bedtime  insulin lispro Injectable (HumaLOG) 24 Unit(s) SubCutaneous three times a day before meals  levothyroxine 50 MICROGram(s) Oral daily  metoprolol succinate ER 12.5 milliGRAM(s) Oral daily  montelukast 10 milliGRAM(s) Oral at bedtime  sodium bicarbonate 650 milliGRAM(s) Oral three times a day  sodium chloride 0.9% lock flush 3 milliLiter(s) IV Push every 8 hours  ticagrelor 90 milliGRAM(s) Oral two times a day    MEDICATIONS  (PRN):  acetaminophen   Tablet 650 milliGRAM(s) Oral every 6 hours PRN For Temp greater than 38 C (100.4 F)  acetaminophen   Tablet. 650 milliGRAM(s) Oral every 6 hours PRN Mild, moderate and severe pain  benzocaine 15 mG/menthol 3.6 mG Lozenge 1 Lozenge Oral every 6 hours PRN Sore Throat  dextrose Gel 1 Dose(s) Oral once PRN Blood Glucose LESS THAN 70 milliGRAM(s)/deciliter  glucagon  Injectable 1 milliGRAM(s) IntraMuscular once PRN Glucose LESS THAN 70 milligrams/deciliter  guaiFENesin    Syrup 100 milliGRAM(s) Oral every 6 hours PRN Cough  sodium chloride 0.65% Nasal 1 Spray(s) Both Nostrils three times a day PRN Nasal Congestion        Vital Signs Last 24 Hrs  T(C): 36.7 (2018 13:36), Max: 38.2 (2018 14:53)  T(F): 98.1 (2018 13:36), Max: 100.7 (2018 14:53)  HR: 96 (2018 13:36) (93 - 100)  BP: 118/85 (2018 13:36) (116/67 - 128/63)  BP(mean): --  RR: 18 (2018 13:36) (16 - 20)  SpO2: 100% (2018 13:36) (97% - 100%)    CBC Full  -  ( 2018 05:00 )  WBC Count : 8.76 K/uL  Hemoglobin : 10.1 g/dL  Hematocrit : 31.4 %  Platelet Count - Automated : 282 K/uL  Mean Cell Volume : 84.9 fL  Mean Cell Hemoglobin : 27.3 pg  Mean Cell Hemoglobin Concentration : 32.2 %  Auto Neutrophil # : 4.92 K/uL  Auto Lymphocyte # : 2.17 K/uL  Auto Monocyte # : 1.17 K/uL  Auto Eosinophil # : 0.38 K/uL  Auto Basophil # : 0.04 K/uL  Auto Neutrophil % : 56.1 %  Auto Lymphocyte % : 24.8 %  Auto Monocyte % : 13.4 %  Auto Eosinophil % : 4.3 %  Auto Basophil % : 0.5 %        139  |  100  |  28<H>  ----------------------------<  122<H>  3.8   |  22  |  1.56<H>    Ca    8.9      2018 05:00  Mg     1.9             Urinalysis Basic - ( 2018 15:20 )    Color: PLYEL / Appearance: CLEAR / S.018 / pH: 6.5  Gluc: >1000 / Ketone: NEGATIVE  / Bili: NEGATIVE / Urobili: NORMAL mg/dL   Blood: TRACE / Protein: 150 mg/dL / Nitrite: NEGATIVE   Leuk Esterase: NEGATIVE / RBC: 0-2 / WBC 0-2   Sq Epi: OCC / Non Sq Epi: x / Bacteria: x        MICROBIOLOGY:    Rapid Respiratory Viral Panel (18 @ 14:15)    Adenovirus (RapRVP): NOT DETECTED    Influenza A (RapRVP): NOT DETECTED (any subtype)    Influenza AH1 2009 (RapRVP): NOT DETECTED    Influenza AH1 (RapRVP): NOT DETECTED    Influenza AH3 (RapRVP): NOT DETECTED    Influenza B (RapRVP): NOT DETECTED    Parainfluenza 1 (RapRVP): NOT DETECTED    Parainfluenza 2 (RapRVP): NOT DETECTED    Parainfluenza 3 (RapRVP): NOT DETECTED    Parainfluenza 4 (RapRVP): NOT DETECTED    Resp Syncytial Virus (RapRVP): NOT DETECTED    Bordetella pertussis (RapRVP): NOT DETECTED    Chlamydia pneumoniae (RapRVP): NOT DETECTED    Mycoplasma pneumoniae (RapRVP): NOT DETECTED This nucleic acid amplification assay was performed using  the [employer]. The following pathogens are tested  for: Adenovirus, Coronavirus 229E, Coronavirus HKU1,  Coronavirus NL63, Coronavirus OC43, Human Metapneumovirus  (HMPV), Rhinovirus/Enterovirus, Influenza A H1, Influenza A  H1 2009 (Pandemic H1 2009), Influenza A H3, Influenza A (Flu  A) subtype not identified, Influenza B, Parainfluenza Virus  (types 1, 2, 3, 4), Respiratory Syncytial Virus (RSV),  Bordetella pertussis, Chlamydophila pneumoniae, and  Mycoplasma pneumoniae. A negative FilmArray result does not  always exclude the possibility of viral or bacterial  infection. Laboratory results should always be interpreted  in the context of clinical findings.    Entero/Rhinovirus (RapRVP): NOT DETECTED    HKU1 Coronavirus (RapRVP): NOT DETECTED    NL63 Coronavirus (RapRVP): NOT DETECTED    229E Coronavirus (RapRVP): NOT DETECTED    OC43 Coronavirus (RapRVP): NOT DETECTED    hMPV (RapRVP): NOT DETECTED        RADIOLOGY

## 2018-01-31 NOTE — PROGRESS NOTE ADULT - SUBJECTIVE AND OBJECTIVE BOX
Patient is a 72y old  Female who presents with a chief complaint of SOB (2018 15:14)      INTERVAL HPI/OVERNIGHT EVENTS:  T(C): 36.7 (18 @ 13:36), Max: 37.1 (18 @ 05:00)  HR: 96 (18 @ 13:36) (93 - 100)  BP: 118/85 (18 @ 13:36) (116/67 - 128/63)  RR: 18 (18 @ 13:36) (16 - 20)  SpO2: 100% (18 @ 13:36) (97% - 100%)  Wt(kg): --  I&O's Summary    2018 07:  -  2018 07:00  --------------------------------------------------------  IN: 640 mL / OUT: 0 mL / NET: 640 mL    2018 07:01  -  2018 18:52  --------------------------------------------------------  IN: 375 mL / OUT: 0 mL / NET: 375 mL        LABS:                        10.1   8.76  )-----------( 282      ( 2018 05:00 )             31.4         139  |  100  |  28<H>  ----------------------------<  122<H>  3.8   |  22  |  1.56<H>    Ca    8.9      2018 05:00  Mg     1.9             Urinalysis Basic - ( 2018 15:20 )    Color: PLYEL / Appearance: CLEAR / S.018 / pH: 6.5  Gluc: >1000 / Ketone: NEGATIVE  / Bili: NEGATIVE / Urobili: NORMAL mg/dL   Blood: TRACE / Protein: 150 mg/dL / Nitrite: NEGATIVE   Leuk Esterase: NEGATIVE / RBC: 0-2 / WBC 0-2   Sq Epi: OCC / Non Sq Epi: x / Bacteria: x      CAPILLARY BLOOD GLUCOSE      POCT Blood Glucose.: 207 mg/dL (2018 17:13)  POCT Blood Glucose.: 173 mg/dL (2018 13:03)  POCT Blood Glucose.: 101 mg/dL (2018 08:43)  POCT Blood Glucose.: 260 mg/dL (2018 21:47)        Urinalysis Basic - ( 2018 15:20 )    Color: PLYEL / Appearance: CLEAR / S.018 / pH: 6.5  Gluc: >1000 / Ketone: NEGATIVE  / Bili: NEGATIVE / Urobili: NORMAL mg/dL   Blood: TRACE / Protein: 150 mg/dL / Nitrite: NEGATIVE   Leuk Esterase: NEGATIVE / RBC: 0-2 / WBC 0-2   Sq Epi: OCC / Non Sq Epi: x / Bacteria: x        MEDICATIONS  (STANDING):  ascorbic acid 500 milliGRAM(s) Oral daily  aspirin enteric coated 81 milliGRAM(s) Oral daily  atorvastatin 40 milliGRAM(s) Oral at bedtime  dextrose 5%. 1000 milliLiter(s) (50 mL/Hr) IV Continuous <Continuous>  dextrose 50% Injectable 12.5 Gram(s) IV Push once  dextrose 50% Injectable 25 Gram(s) IV Push once  dextrose 50% Injectable 25 Gram(s) IV Push once  famotidine    Tablet 20 milliGRAM(s) Oral daily  heparin  Injectable 5000 Unit(s) SubCutaneous every 12 hours  insulin glargine Injectable (LANTUS) 20 Unit(s) SubCutaneous every morning  insulin glargine Injectable (LANTUS) 68 Unit(s) SubCutaneous at bedtime  insulin lispro (HumaLOG) corrective regimen sliding scale   SubCutaneous three times a day before meals  insulin lispro (HumaLOG) corrective regimen sliding scale   SubCutaneous at bedtime  insulin lispro Injectable (HumaLOG) 24 Unit(s) SubCutaneous three times a day before meals  levothyroxine 50 MICROGram(s) Oral daily  metoprolol succinate ER 12.5 milliGRAM(s) Oral daily  montelukast 10 milliGRAM(s) Oral at bedtime  sodium bicarbonate 650 milliGRAM(s) Oral three times a day  sodium chloride 0.9% lock flush 3 milliLiter(s) IV Push every 8 hours  ticagrelor 90 milliGRAM(s) Oral two times a day    MEDICATIONS  (PRN):  acetaminophen   Tablet 650 milliGRAM(s) Oral every 6 hours PRN For Temp greater than 38 C (100.4 F)  acetaminophen   Tablet. 650 milliGRAM(s) Oral every 6 hours PRN Mild, moderate and severe pain  benzocaine 15 mG/menthol 3.6 mG Lozenge 1 Lozenge Oral every 6 hours PRN Sore Throat  dextrose Gel 1 Dose(s) Oral once PRN Blood Glucose LESS THAN 70 milliGRAM(s)/deciliter  glucagon  Injectable 1 milliGRAM(s) IntraMuscular once PRN Glucose LESS THAN 70 milligrams/deciliter  guaiFENesin    Syrup 100 milliGRAM(s) Oral every 6 hours PRN Cough  sodium chloride 0.65% Nasal 1 Spray(s) Both Nostrils three times a day PRN Nasal Congestion          PHYSICAL EXAM:  GENERAL: NAD, well-groomed, well-developed  HEAD:  Atraumatic, Normocephalic  CHEST/LUNG: Clear to percussion bilaterally; No rales, rhonchi, wheezing, or rubs  HEART: Regular rate and rhythm; No murmurs, rubs, or gallops  ABDOMEN: Soft, Nontender, Nondistended; Bowel sounds present  EXTREMITIES:  2+ Peripheral Pulses, No clubbing, cyanosis, or edema  LYMPH: No lymphadenopathy noted  SKIN: No rashes or lesions    Care Discussed with Consultants/Other Providers [+ ] YES  [ ] NO

## 2018-01-31 NOTE — CONSULT NOTE ADULT - PROBLEM SELECTOR RECOMMENDATION 9
baseline 1.5-1.8, stable, check UA  monitor bmp
Will increase Lantus to 45 units at bed time.  Will increase Humalog to 8 units before each meal in addition to Humalog correction scale coverage.  Patient counseled for compliance with consistent low carb diet.
-CXR clear  -IV looks ok  -suspect viral URI  -f/u bcx and RVP  -monitor temps
seems viral URI: RVP is negative for Influenza: The chest radiograph is clear. for now symptomatic treatment: And monitor for further fever: no antibiotics

## 2018-01-31 NOTE — CONSULT NOTE ADULT - ASSESSMENT
Assessment  DMT2: 72y Female with DM T2 with hyperglycemia on insulin, blood sugars running  high, no hypoglycemia,  eating meals,  non compliant with low carb diet.  Hypothyroidism: On synthroid 50 mcg po daily,  HTN: Controlled, On med.  CHF: On medications, monitored.
71 Female, with a PmHx of HTN, CKD, Hyperlipidemia, DM-II, CAD s/p CABG x 3, s/p stents, presenting to the [facility] ED c/o shortness of breath with HF with fever and URI symptoms
71 Female, with a PmHx of HTN, CKD, Hyperlipidemia, DM-II, CAD s/p CABG x 3, s/p stents, presenting to the [facility] ED c/o shortness of breath. Pt is being admitted to telemetry for acute on chronic systolic heart failure.    Problem/Plan - 1:  ·  Problem: CHF exacerbation.  Plan: telemonitor   strict I & O's, daily wts  Fluid restriction  diuresis as per cards  cards and pulm fu    Problem/Plan - 2:  ·  Problem: Diabetes mellitus, type 2.  Plan: monitor fs   basal/bolus     Problem/Plan - 3:  ·  Problem: Hyperlipidemia.  Plan: cw statin    Problem/Plan - 4:  ·  Problem: Hypothyroidism.  Plan: cw levothyroxine.
71 Female, with a PmHx of HTN, CKD, Hyperlipidemia, DM-II, CAD s/p CABG x 3, s/p stents, presenting to the [facility] ED c/o shortness of breath
71 Female, with a PmHx of HTN, CKD, Hyperlipidemia, DM-II, CAD s/p CABG x 3, s/p stents, presenting to the [facility] ED c/o shortness of breath. Pt is being admitted to telemetry for acute on chronic systolic heart failure.  Now she is with new fever, as well as URI

## 2018-01-31 NOTE — PROGRESS NOTE ADULT - SUBJECTIVE AND OBJECTIVE BOX
Dr. Muhammad (Nephrology)  Office [phone]  Cell [phone]  [facility] FI[facility]  Cell [phone]      Patient is a 72y old  Female who presents with a chief complaint of SOB (25 Jan 2018 15:14)      Patient seen and examined at bedside. No chest pain/sob, feeling feverish, +coughing     VITALS:  T(F): 98.7 (01-31-18 @ 05:00), Max: 100.7 (01-30-18 @ 14:53)  HR: 95 (01-31-18 @ 05:00)  BP: 116/67 (01-31-18 @ 05:00)  RR: 18 (01-31-18 @ 05:00)  SpO2: 100% (01-31-18 @ 05:00)  Wt(kg): --    01-30 @ 07:01  -  01-31 @ 07:00  --------------------------------------------------------  IN: 640 mL / OUT: 0 mL / NET: 640 mL          PHYSICAL EXAM:  Constitutional: NAD  Neck: No JVD  Respiratory: CTAB, no wheezes, rales or rhonchi  Cardiovascular: S1, S2, RRR  Gastrointestinal: BS+, soft, NT/ND  Extremities: No peripheral edema    Hospital Medications:   MEDICATIONS  (STANDING):  ascorbic acid 500 milliGRAM(s) Oral daily  aspirin enteric coated 81 milliGRAM(s) Oral daily  atorvastatin 40 milliGRAM(s) Oral at bedtime  dextrose 5%. 1000 milliLiter(s) (50 mL/Hr) IV Continuous <Continuous>  dextrose 50% Injectable 12.5 Gram(s) IV Push once  dextrose 50% Injectable 25 Gram(s) IV Push once  dextrose 50% Injectable 25 Gram(s) IV Push once  famotidine    Tablet 20 milliGRAM(s) Oral daily  heparin  Injectable 5000 Unit(s) SubCutaneous every 12 hours  insulin glargine Injectable (LANTUS) 20 Unit(s) SubCutaneous every morning  insulin glargine Injectable (LANTUS) 68 Unit(s) SubCutaneous at bedtime  insulin lispro (HumaLOG) corrective regimen sliding scale   SubCutaneous three times a day before meals  insulin lispro (HumaLOG) corrective regimen sliding scale   SubCutaneous at bedtime  insulin lispro Injectable (HumaLOG) 24 Unit(s) SubCutaneous three times a day before meals  levothyroxine 50 MICROGram(s) Oral daily  metoprolol succinate ER 12.5 milliGRAM(s) Oral daily  montelukast 10 milliGRAM(s) Oral at bedtime  sodium bicarbonate 650 milliGRAM(s) Oral three times a day  sodium chloride 0.9% lock flush 3 milliLiter(s) IV Push every 8 hours  ticagrelor 90 milliGRAM(s) Oral two times a day      LABS:  01-31    139  |  100  |  28<H>  ----------------------------<  122<H>  3.8   |  22  |  1.56<H>    Ca    8.9      31 Jan 2018 05:00  Mg     1.9     01-31      Creatinine Trend: 1.56 <--, 1.53 <--, 1.49 <--, 1.58 <--, 1.60 <--, 1.77 <--, 1.75 <--                                10.1   8.76  )-----------( 282      ( 31 Jan 2018 05:00 )             31.4     Urine Studies:  Urinalysis - [01-30-18 @ 15:20]      Color PLYEL / Appearance CLEAR / SG 1.018 / pH 6.5      Gluc >1000 / Ketone NEGATIVE  / Bili NEGATIVE / Urobili NORMAL       Blood TRACE / Protein 150 / Leuk Est NEGATIVE / Nitrite NEGATIVE      RBC 0-2 / WBC 0-2 / Hyaline  / Gran  / Sq Epi OCC / Non Sq Epi  / Bacteria       Iron 40, TIBC 377, %sat --      [01-20-18 @ 06:06]  Ferritin 38.35      [01-20-18 @ 06:06]  HbA1c 9.1      [11-25-17 @ 06:30]  TSH 0.55      [01-19-18 @ 06:45]  Lipid: chol 130, , HDL 30, LDL 70      [01-19-18 @ 06:45]        RADIOLOGY & ADDITIONAL STUDIES:

## 2018-01-31 NOTE — CONSULT NOTE ADULT - SUBJECTIVE AND OBJECTIVE BOX
HPI:  71 Female, with a PmHx of HTN, CKD, Hyperlipidemia, DM-II, CAD s/p CABG x 3, s/p stents, presenting to the [facility] ED c/o shortness of breath. Pt states for the past 2 weeks she has been feeling sob but for the past 2 days it was getting worse and this morning had gotten even worse so she had gone to see her doctor and was then referred to the hospital for an evaluation.  She denies any coughing, fever, chills, HA, dizziness, blurred vision, n/v, sick contacts, recent travel. Pt sates she was also c/o a burning sensation to the center of her chest that was non-radiating. Pt was recently admitted to [facility] in 12/2017 for a similar event that was requiring bipap and was in the CCU.  In the ED today, she had a cxr done that showed a trace bilateral pleural effusions and vascular pulmonary congestion. RVP is neg. Pt appear to be in mild distress at this time but is saturating 100% on nasal cannula. Pt was re-admitted to telemetry for acute on chronic systolic heart failure. (19 Jan 2018 09:48)  Patient has history of diabetes, daughter at bed side, patient on insulin at home, no recent hypoglycemic episodes, no polyuria polydipsia. Patient follows up with PCP for diabetes management.    PAST MEDICAL & SURGICAL HISTORY:  GERD (gastroesophageal reflux disease)  CAD (coronary artery disease): s/p CABG  Hypothyroidism  Hyperlipidemia  Diabetes mellitus, type 2  S/P CABG (coronary artery bypass graft)      FAMILY HISTORY:  Family history of hypertension (Sibling): sister  Family history of diabetes mellitus (Sibling): brothers/sisters      Social History:    Outpatient Medications:    MEDICATIONS  (STANDING):  aspirin enteric coated 81 milliGRAM(s) Oral daily  atorvastatin 40 milliGRAM(s) Oral at bedtime  dextrose 5%. 1000 milliLiter(s) (50 mL/Hr) IV Continuous <Continuous>  dextrose 50% Injectable 12.5 Gram(s) IV Push once  dextrose 50% Injectable 25 Gram(s) IV Push once  dextrose 50% Injectable 25 Gram(s) IV Push once  famotidine    Tablet 20 milliGRAM(s) Oral daily  furosemide   Injectable 40 milliGRAM(s) IV Push daily  heparin  Injectable 5000 Unit(s) SubCutaneous every 12 hours  insulin glargine Injectable (LANTUS) 45 Unit(s) SubCutaneous at bedtime  insulin lispro (HumaLOG) corrective regimen sliding scale   SubCutaneous three times a day before meals  insulin lispro (HumaLOG) corrective regimen sliding scale   SubCutaneous at bedtime  insulin lispro Injectable (HumaLOG) 8 Unit(s) SubCutaneous three times a day before meals  levothyroxine 50 MICROGram(s) Oral daily  metoprolol succinate ER 12.5 milliGRAM(s) Oral daily  montelukast 10 milliGRAM(s) Oral at bedtime  sodium chloride 0.9% lock flush 3 milliLiter(s) IV Push every 8 hours  ticagrelor 90 milliGRAM(s) Oral two times a day    MEDICATIONS  (PRN):  dextrose Gel 1 Dose(s) Oral once PRN Blood Glucose LESS THAN 70 milliGRAM(s)/deciliter  glucagon  Injectable 1 milliGRAM(s) IntraMuscular once PRN Glucose LESS THAN 70 milligrams/deciliter      Allergies    No Known Allergies    Intolerances      Review of Systems:  Constitutional: No fever, no chills  Eyes: No blurry vision  Neuro: No tremors  HEENT: No pain, no neck swelling  Cardiovascular: No chest pain, no palpitations  Respiratory: Has SOB, no cough  GI: No nausea, vomiting, abdominal pain  : No dysuria  Skin: no rash  MSK: Has leg swelling, no foot ulcers.  Psych: no depression  Endocrine: no polyuria, polydipsia    ALL OTHER SYSTEMS REVIEWED AND NEGATIVE    UNABLE TO OBTAIN    PHYSICAL EXAM:  VITALS: T(C): 36.4 (01-19-18 @ 15:10)  T(F): 97.6 (01-19-18 @ 15:10), Max: 98.3 (01-18-18 @ 19:56)  HR: 87 (01-19-18 @ 17:43) (81 - 92)  BP: 109/64 (01-19-18 @ 17:43) (109/64 - 152/66)  RR:  (18 - 32)  SpO2:  (98% - 100%)  Wt(kg): --  GENERAL: NAD, well-groomed, well-developed  EYES: No proptosis, no lid lag  HEENT:  Atraumatic, Normocephalic  THYROID: Normal size, no palpable nodules  RESPIRATORY: Clear to auscultation bilaterally; No rales, rhonchi, wheezing  CARDIOVASCULAR: Si S2, No murmurs;  GI: Soft, non distended, normal bowel sounds  SKIN: Dry, intact, No rashes or lesions  MUSCULOSKELETAL: Has BL lower extremity edema.  NEURO:  no tremor, sensation decreased in feet BL,    POCT Blood Glucose.: 148 mg/dL (01-19-18 @ 17:35)  POCT Blood Glucose.: 319 mg/dL (01-19-18 @ 13:23)                            9.8    10.27 )-----------( 298      ( 19 Jan 2018 06:45 )             31.2       01-19    140  |  107  |  27<H>  ----------------------------<  255<H>  4.0   |  20<L>  |  1.68<H>    EGFR if : 35  EGFR if non : 30    Ca    9.0      01-19  Mg     1.9     01-19  Phos  2.8     01-19    TPro  7.6  /  Alb  3.9  /  TBili  0.2  /  DBili  x   /  AST  26  /  ALT  14  /  AlkPhos  60  01-18      Thyroid Function Tests:  01-19 @ 06:45 TSH 0.55 FreeT4 -- T3 -- Anti TPO -- Anti Thyroglobulin Ab -- TSI --      Hemoglobin A1C, Whole Blood: 9.1 % <H> [4.0 - 5.6] (11-25-17 @ 06:30)      01-19 Chol 130 LDL 70 HDL 30<L> Trig 246<H>    Radiology:
cc SOB    [place] 71 Female, with a PmHx of HTN, CKD, Hyperlipidemia, DM-II, CAD s/p CABG x 3, s/p stents, presenting to the [facility] ED c/o shortness of breath. Pt states for the past 2 weeks she has been feeling sob but for the past 2 days it was getting worse and this morning had gotten even worse so she had gone to see her doctor and was then referred to the hospital for an evaluation.  She denies any coughing, fever, chills, HA, dizziness, blurred vision, n/v, sick contacts, recent travel. Pt sates she was also c/o a burning sensation to the center of her chest that was non-radiating. Pt was recently admitted to [facility] in 12/2017 for a similar event that was requiring bipap and was in the CCU.  In the ED today, she had a cxr done that showed a trace bilateral pleural effusions and vascular pulmonary congestion. RVP is neg. Pt appear to be in mild distress at this time but is saturating 100% on nasal cannula. Pt was re-admitted to telemetry for acute on chronic systolic heart failure.    Allergies NKDA    REVIEW OF SYSTEMS:    CONSTITUTIONAL: No weakness, fevers or chills  EYES/ENT: No visual changes;  No vertigo or throat pain   NECK: No pain or stiffness  RESPIRATORY: No cough, wheezing, hemoptysis; No shortness of breath  CARDIOVASCULAR: No chest pain or palpitations  GASTROINTESTINAL: No abdominal or epigastric pain. No nausea, vomiting, or hematemesis; No diarrhea or constipation. No melena or hematochezia.  GENITOURINARY: No dysuria, frequency or hematuria  NEUROLOGICAL: No numbness or weakness  SKIN: No itching, burning, rashes, or lesions   All other review of systems is negative unless indicated above.    Home Medications:   * Patient Currently Takes Medications as of 19-Jan-2018 09:24 documented in Structured Notes  · 	furosemide 40 mg oral tablet: 1 tab(s) orally once a day  · 	aspirin 81 mg oral delayed release tablet: 1 tab(s) orally once a day, Last Dose Taken:    · 	montelukast 10 mg oral tablet: 1 tab(s) orally once a day (at bedtime), Last Dose Taken:    · 	levothyroxine 50 mcg (0.05 mg) oral tablet: 1 tab(s) orally once a day, Last Dose Taken:    · 	Vol-Care Rx oral tablet: 1 tab(s) orally once a day, Last Dose Taken:    · 	raNITIdine 150 mg oral capsule: 1 cap(s) orally 2 times a day, Last Dose Taken:    · 	atorvastatin 40 mg oral tablet: 1 tab(s) orally once a day, Last Dose Taken:    · 	alendronate 70 mg oral tablet: 1 tab(s) orally once a week, Last Dose Taken:    · 	Toujeo SoloStar 300 units/mL subcutaneous solution: 65 unit(s) subcutaneous once a day (at bedtime), Last Dose Taken:    · 	Apidra 100 units/mL subcutaneous solution: 24 microcurie subcutaneous 3 times a day (before meals), Last Dose Taken:    Patient History:   Past Medical History:  CAD (coronary artery disease)  s/p CABG  Diabetes mellitus, type 2    GERD (gastroesophageal reflux disease)    Hyperlipidemia    Hypothyroidism.    Past Surgical History:  S/P CABG (coronary artery bypass graft).    Family History:  Sibling  Still living? Yes, Estimated age: Age UnknownFamily history of diabetes mellitus, Age at diagnosis: Age Unknown  Family history of hypertension, Age at diagnosis: Age Unknown.    Social History:  Social History (marital status, living situation, occupation, tobacco use, alcohol and drug use, and sexual history): Marital Status:  -  lives in [facility] - lives with daughter   Occupation: Homemaker   Tobacco Use: neg   ETOH Use: neg   Flu Vaccine:    4 months ago         Pneumonia Vaccine: neg	    Physical exam    General: WN/WD NAD  PERRLA  Neurology: A&Ox3, nonfocal, CUADRA x 4  Respiratory: CTA B/L  CV: RRR, S1S2, no murmurs, rubs or gallops  Abdominal: Soft, NT, ND +BS, Last BM  Extremities: No edema, + peripheral pulses  Skin Normal     Lab Results:  CBC  CBC Full  -  ( 19 Jan 2018 06:45 )  WBC Count : 10.27 K/uL  Hemoglobin : 9.8 g/dL  Hematocrit : 31.2 %  Platelet Count - Automated : 298 K/uL  Mean Cell Volume : 85.0 fL  Mean Cell Hemoglobin : 26.7 pg  Mean Cell Hemoglobin Concentration : 31.4 %  Auto Neutrophil # : x  Auto Lymphocyte # : x  Auto Monocyte # : x  Auto Eosinophil # : x  Auto Basophil # : x  Auto Neutrophil % : x  Auto Lymphocyte % : x  Auto Monocyte % : x  Auto Eosinophil % : x  Auto Basophil % : x    .		Differential:	[] Automated		[] Manual  Chemistry                        9.8    10.27 )-----------( 298      ( 19 Jan 2018 06:45 )             31.2     01-19    140  |  107  |  27<H>  ----------------------------<  255<H>  4.0   |  20<L>  |  1.68<H>    Ca    9.0      19 Jan 2018 06:45  Phos  2.8     01-19  Mg     1.9     01-19    TPro  7.6  /  Alb  3.9  /  TBili  0.2  /  DBili  x   /  AST  26  /  ALT  14  /  AlkPhos  60  01-18    LIVER FUNCTIONS - ( 18 Jan 2018 20:18 )  Alb: 3.9 g/dL / Pro: 7.6 g/dL / ALK PHOS: 60 u/L / ALT: 14 u/L / AST: 26 u/L / GGT: x                     MICROBIOLOGY/CULTURES:      RADIOLOGY RESULTS: reviewed
Dr. Muhammad (Nephrology)  Office [phone]  Cell [phone]  [facility]  Cell [phone]    HPI:  71 Female, with a PmHx of HTN, CKD, Hyperlipidemia, DM-II, CAD s/p CABG x 3, s/p stents, presenting to the [facility] ED c/o shortness of breath. Pt states for the past 2 weeks she has been feeling sob but for the past 2 days it was getting worse and this morning had gotten even worse so she had gone to see her doctor and was then referred to the hospital for an evaluation.  She denies any coughing, fever, chills, HA, dizziness, blurred vision, n/v, sick contacts, recent travel. Pt sates she was also c/o a burning sensation to the center of her chest that was non-radiating. Pt was recently admitted to [facility] in 12/2017 for a similar event that was requiring bipap and was in the CCU.  In the ED today, she had a cxr done that showed a trace bilateral pleural effusions and vascular pulmonary congestion. RVP is neg. Pt appear to be in mild distress at this time but is saturating 100% on nasal cannula. Pt was re-admitted to telemetry for acute on chronic systolic heart failure.       Allergies:  No Known Allergies      PAST MEDICAL & SURGICAL HISTORY:  GERD (gastroesophageal reflux disease)  CAD (coronary artery disease): s/p CABG  Hypothyroidism  Hyperlipidemia  Diabetes mellitus, type 2  S/P CABG (coronary artery bypass graft)      Home Medications Reviewed    Hospital Medications:   MEDICATIONS  (STANDING):  aspirin enteric coated 81 milliGRAM(s) Oral daily  atorvastatin 40 milliGRAM(s) Oral at bedtime  dextrose 5%. 1000 milliLiter(s) (50 mL/Hr) IV Continuous <Continuous>  dextrose 50% Injectable 12.5 Gram(s) IV Push once  dextrose 50% Injectable 25 Gram(s) IV Push once  dextrose 50% Injectable 25 Gram(s) IV Push once  famotidine    Tablet 20 milliGRAM(s) Oral daily  furosemide   Injectable 40 milliGRAM(s) IV Push daily  heparin  Injectable 5000 Unit(s) SubCutaneous every 12 hours  insulin glargine Injectable (LANTUS) 45 Unit(s) SubCutaneous at bedtime  insulin lispro (HumaLOG) corrective regimen sliding scale   SubCutaneous three times a day before meals  insulin lispro (HumaLOG) corrective regimen sliding scale   SubCutaneous at bedtime  insulin lispro Injectable (HumaLOG) 8 Unit(s) SubCutaneous three times a day before meals  levothyroxine 50 MICROGram(s) Oral daily  metoprolol succinate ER 12.5 milliGRAM(s) Oral daily  montelukast 10 milliGRAM(s) Oral at bedtime  sodium chloride 0.9% lock flush 3 milliLiter(s) IV Push every 8 hours  ticagrelor 90 milliGRAM(s) Oral two times a day      SOCIAL HISTORY:  Denies ETOh, Smoking,     FAMILY HISTORY:  Family history of hypertension (Sibling): sister  Family history of diabetes mellitus (Sibling): brothers/sisters      REVIEW OF SYSTEMS:  CONSTITUTIONAL: No weakness, fevers or chills  EYES/ENT: No visual changes;  No vertigo or throat pain   NECK: No pain or stiffness  RESPIRATORY: see HPI  CARDIOVASCULAR: see HPI  GASTROINTESTINAL: No abdominal or epigastric pain. No nausea, vomiting, or hematemesis; No diarrhea or constipation. No melena or hematochezia.  GENITOURINARY: No dysuria, frequency, foamy urine, urinary urgency, incontinence or hematuria  NEUROLOGICAL: No numbness or weakness  SKIN: No itching, burning, rashes, or lesions   VASCULAR: No bilateral lower extremity edema.   All other review of systems is negative unless indicated above.    VITALS:  T(F): 97.6 (01-19-18 @ 15:10), Max: 98.3 (01-18-18 @ 19:56)  HR: 88 (01-19-18 @ 15:10)  BP: 121/64 (01-19-18 @ 15:10)  RR: 18 (01-19-18 @ 15:10)  SpO2: 100% (01-19-18 @ 15:10)  Wt(kg): --    01-19 @ 07:01 - 01-19 @ 15:26  --------------------------------------------------------  IN: 160 mL / OUT: 500 mL / NET: -340 mL      Height (cm): 147.32 (01-19 @ 09:48)  Weight (kg): 59 (01-19 @ 09:48)  BMI (kg/m2): 27.2 (01-19 @ 09:48)  BSA (m2): 1.52 (01-19 @ 09:48)    PHYSICAL EXAM:  Constitutional: NAD  HEENT: anicteric sclera, oropharynx clear, MMM  Neck: No JVD  Respiratory: poor air entry b/l  Cardiovascular: S1, S2, RRR  Gastrointestinal: BS+, soft, NT/ND  Extremities: No cyanosis or clubbing. No peripheral edema  Neurological: A/O x 3, no focal deficits  Psychiatric: Normal mood, normal affect  : No CVA tenderness. No cuellar.   Skin: No rashes      LABS:  01-19    140  |  107  |  27<H>  ----------------------------<  255<H>  4.0   |  20<L>  |  1.68<H>    Ca    9.0      19 Jan 2018 06:45  Phos  2.8     01-19  Mg     1.9     01-19    TPro  7.6  /  Alb  3.9  /  TBili  0.2  /  DBili      /  AST  26  /  ALT  14  /  AlkPhos  60  01-18    Creatinine Trend: 1.68 <--, 1.45 <--                        9.8    10.27 )-----------( 298      ( 19 Jan 2018 06:45 )             31.2     Urine Studies:        RADIOLOGY & ADDITIONAL STUDIES:
SELVIN CLAIRE 72y Female  MRN-[id number]    Patient is a 72y old  Female who presents with a chief complaint of SOB (2018 15:14)      HPI:  71 Female, with a PmHx of HTN, CKD, Hyperlipidemia, DM-II, CAD s/p CABG x 3, s/p stents, presenting to the [facility] ED c/o shortness of breath. Pt states for the past 2 weeks she has been feeling sob but for the past 2 days it was getting worse and this morning had gotten even worse so she had gone to see her doctor and was then referred to the hospital for an evaluation.  She denies any coughing, fever, chills, HA, dizziness, blurred vision, n/v, sick contacts, recent travel. Pt sates she was also c/o a burning sensation to the center of her chest that was non-radiating. Pt was recently admitted to [facility] in 2017 for a similar event that was requiring bipap and was in the CCU.  In the ED today, she had a cxr done that showed a trace bilateral pleural effusions and vascular pulmonary congestion. RVP is neg. Pt appear to be in mild distress at this time but is saturating 100% on nasal cannula. Pt was re-admitted to telemetry for acute on chronic systolic heart failure. (2018 09:48)    Admitted with heart failure    Today had fever and throat pain and nasal congestion      PAST MEDICAL & SURGICAL HISTORY:  GERD (gastroesophageal reflux disease)  CAD (coronary artery disease): s/p CABG  Hypothyroidism  Hyperlipidemia  Diabetes mellitus, type 2  S/P CABG (coronary artery bypass graft)      Allergies    No Known Allergies    Intolerances        ANTIMICROBIALS:      MEDICATIONS  (STANDING):  aspirin enteric coated 81 milliGRAM(s) Oral daily  atorvastatin 40 milliGRAM(s) Oral at bedtime  dextrose 5%. 1000 milliLiter(s) (50 mL/Hr) IV Continuous <Continuous>  dextrose 50% Injectable 12.5 Gram(s) IV Push once  dextrose 50% Injectable 25 Gram(s) IV Push once  dextrose 50% Injectable 25 Gram(s) IV Push once  famotidine    Tablet 20 milliGRAM(s) Oral daily  heparin  Injectable 5000 Unit(s) SubCutaneous every 12 hours  insulin glargine Injectable (LANTUS) 20 Unit(s) SubCutaneous every morning  insulin glargine Injectable (LANTUS) 68 Unit(s) SubCutaneous at bedtime  insulin lispro (HumaLOG) corrective regimen sliding scale   SubCutaneous three times a day before meals  insulin lispro (HumaLOG) corrective regimen sliding scale   SubCutaneous at bedtime  insulin lispro Injectable (HumaLOG) 24 Unit(s) SubCutaneous three times a day before meals  levothyroxine 50 MICROGram(s) Oral daily  metoprolol succinate ER 12.5 milliGRAM(s) Oral daily  montelukast 10 milliGRAM(s) Oral at bedtime  sodium bicarbonate 650 milliGRAM(s) Oral three times a day  sodium chloride 0.9% lock flush 3 milliLiter(s) IV Push every 8 hours  ticagrelor 90 milliGRAM(s) Oral two times a day      Social History  Smoking: no  Etoh: no  Drug use: no      FAMILY HISTORY:  Family history of hypertension (Sibling): sister  Family history of diabetes mellitus (Sibling): brothers/sisters      Vital Signs Last 24 Hrs  T(C): 38.2 (2018 14:53), Max: 38.2 (2018 14:53)  T(F): 100.7 (2018 14:53), Max: 100.7 (2018 14:53)  HR: 95 (2018 13:47) (87 - 105)  BP: 135/89 (2018 13:47) (103/65 - 135/89)  BP(mean): --  RR: 16 (2018 13:47) (16 - 16)  SpO2: 100% (2018 13:47) (97% - 100%)    CBC Full  -  ( 2018 09:19 )  WBC Count : 8.26 K/uL  Hemoglobin : 9.8 g/dL  Hematocrit : 30.7 %  Platelet Count - Automated : 292 K/uL  Mean Cell Volume : 84.1 fL  Mean Cell Hemoglobin : 26.8 pg  Mean Cell Hemoglobin Concentration : 31.9 %  Auto Neutrophil # : x  Auto Lymphocyte # : x  Auto Monocyte # : x  Auto Eosinophil # : x  Auto Basophil # : x  Auto Neutrophil % : x  Auto Lymphocyte % : x  Auto Monocyte % : x  Auto Eosinophil % : x  Auto Basophil % : x        139  |  101  |  30<H>  ----------------------------<  178<H>  4.0   |  24  |  1.53<H>    Ca    8.8      2018 09:16  Mg     2.0             Urinalysis Basic - ( 2018 15:20 )    Color: PLYEL / Appearance: CLEAR / S.018 / pH: 6.5  Gluc: >1000 / Ketone: NEGATIVE  / Bili: NEGATIVE / Urobili: NORMAL mg/dL   Blood: TRACE / Protein: 150 mg/dL / Nitrite: NEGATIVE   Leuk Esterase: NEGATIVE / RBC: 0-2 / WBC 0-2   Sq Epi: OCC / Non Sq Epi: x / Bacteria: x        MICROBIOLOGY:      Culture - Urine (12.10.17 @ 10:19)    Culture - Urine:   GPC^Gram pos. cocci  COLONY COUNT: LESS THAN 10,000 CFU/ML    Specimen Source: URINE MIDSTREAM      RADIOLOGY  Xray Chest 1 View- PORTABLE-Urgent (18 @ 15:33) >  Clear lungs.    Resolution of previous pulmonary edema and bilateral pleural effusions.       CT Chest No Cont (18 @ 09:55) >  Mild pulmonary edema with small simple bilateral pleural effusions, right   greater than left, with no evidence of loculation.
I have seen and examined the patient and history reviewed: Pt h as hx of asthmatic bronchitis, the daughter says her  mother gets wheezing when she gets uri and not on any daily medications for asthma came in initially with CHF exacerbation and developed fever sniffles as well as URI like symptoms while in the hospital and hence the pulmonary called. Currently pt is saying she has nasal congestion and mild SOB.   Patient is a 72y old  Female who presents with a chief complaint of SOB (2018 15:14)      HPI:  71 Female, with a PmHx of HTN, CKD, Hyperlipidemia, DM-II, CAD s/p CABG x 3, s/p stents, presenting to the [facility] ED c/o shortness of breath. Pt states for the past 2 weeks she has been feeling sob but for the past 2 days it was getting worse and this morning had gotten even worse so she had gone to see her doctor and was then referred to the hospital for an evaluation.  She denies any coughing, fever, chills, HA, dizziness, blurred vision, n/v, sick contacts, recent travel. Pt sates she was also c/o a burning sensation to the center of her chest that was non-radiating. Pt was recently admitted to [facility] in 2017 for a similar event that was requiring bipap and was in the CCU.  In the ED today, she had a cxr done that showed a trace bilateral pleural effusions and vascular pulmonary congestion. RVP is neg. Pt appear to be in mild distress at this time but is saturating 100% on nasal cannula. Pt was re-admitted to telemetry for acute on chronic systolic heart failure. (2018 09:48)      ?FOLLOWING PRESENT  [ x] Hx of PE/DVT, [ x] Hx COPD, y[ ] Hx of Asthma, [ y] Hx of Hospitalization, [x ]  Hx of BiPAP/CPAP use, [ x] Hx of MONA    Allergies    No Known Allergies    Intolerances        PAST MEDICAL & SURGICAL HISTORY:  GERD (gastroesophageal reflux disease)  CAD (coronary artery disease): s/p CABG  Hypothyroidism  Hyperlipidemia  Diabetes mellitus, type 2  S/P CABG (coronary artery bypass graft)      FAMILY HISTORY:  Family history of hypertension (Sibling): sister  Family history of diabetes mellitus (Sibling): brothers/sisters      Social History: [  x] TOBACCO                  [  x] ETOH                                 [ x ] IVDA/DRUGS    REVIEW OF SYSTEMS      General:	nasal congestion    Skin/Breast:x  	  Ophthalmologic:x  	  ENMT:	x    Respiratory and Thorax: sob , ZEE   	  Cardiovascular:	x    Gastrointestinal:	x    Genitourinary:	x    Musculoskeletal:	x    Neurological:	x    Psychiatric:	x    Hematology/Lymphatics:	x    Endocrine:	x    Allergic/Immunologic:	x    MEDICATIONS  (STANDING):  ascorbic acid 500 milliGRAM(s) Oral daily  aspirin enteric coated 81 milliGRAM(s) Oral daily  atorvastatin 40 milliGRAM(s) Oral at bedtime  dextrose 5%. 1000 milliLiter(s) (50 mL/Hr) IV Continuous <Continuous>  dextrose 50% Injectable 12.5 Gram(s) IV Push once  dextrose 50% Injectable 25 Gram(s) IV Push once  dextrose 50% Injectable 25 Gram(s) IV Push once  famotidine    Tablet 20 milliGRAM(s) Oral daily  heparin  Injectable 5000 Unit(s) SubCutaneous every 12 hours  insulin glargine Injectable (LANTUS) 20 Unit(s) SubCutaneous every morning  insulin glargine Injectable (LANTUS) 68 Unit(s) SubCutaneous at bedtime  insulin lispro (HumaLOG) corrective regimen sliding scale   SubCutaneous three times a day before meals  insulin lispro (HumaLOG) corrective regimen sliding scale   SubCutaneous at bedtime  insulin lispro Injectable (HumaLOG) 24 Unit(s) SubCutaneous three times a day before meals  levothyroxine 50 MICROGram(s) Oral daily  metoprolol succinate ER 12.5 milliGRAM(s) Oral daily  montelukast 10 milliGRAM(s) Oral at bedtime  sodium bicarbonate 650 milliGRAM(s) Oral three times a day  sodium chloride 0.9% lock flush 3 milliLiter(s) IV Push every 8 hours  ticagrelor 90 milliGRAM(s) Oral two times a day    MEDICATIONS  (PRN):  acetaminophen   Tablet 650 milliGRAM(s) Oral every 6 hours PRN For Temp greater than 38 C (100.4 F)  acetaminophen   Tablet. 650 milliGRAM(s) Oral every 6 hours PRN Mild, moderate and severe pain  benzocaine 15 mG/menthol 3.6 mG Lozenge 1 Lozenge Oral every 6 hours PRN Sore Throat  dextrose Gel 1 Dose(s) Oral once PRN Blood Glucose LESS THAN 70 milliGRAM(s)/deciliter  glucagon  Injectable 1 milliGRAM(s) IntraMuscular once PRN Glucose LESS THAN 70 milligrams/deciliter  guaiFENesin    Syrup 100 milliGRAM(s) Oral every 6 hours PRN Cough  sodium chloride 0.65% Nasal 1 Spray(s) Both Nostrils three times a day PRN Nasal Congestion       Vital Signs Last 24 Hrs  T(C): 36.8 (2018 10:32), Max: 38.2 (2018 14:53)  T(F): 98.2 (2018 10:32), Max: 100.7 (2018 14:53)  HR: 95 (2018 05:00) (93 - 100)  BP: 116/67 (2018 05:00) (116/67 - 135/89)  BP(mean): --  RR: 18 (2018 05:00) (16 - 20)  SpO2: 100% (2018 05:00) (97% - 100%)        I&O's Summary    2018 07:  -  2018 07:00  --------------------------------------------------------  IN: 640 mL / OUT: 0 mL / NET: 640 mL    2018 07:01  -  2018 11:19  --------------------------------------------------------  IN: 200 mL / OUT: 0 mL / NET: 200 mL        Physical Exam:   GENERAL: NAD, well-groomed, well-developed  HEENT: MOHSEN/   Atraumatic, Normocephalic  ENMT: No tonsillar erythema, exudates, or enlargement; Moist mucous membranes, Good dentition, No lesions  NECK: Supple, No JVD, Normal thyroid  CHEST/LUNG: No Wheezing, scattered crackles   CVS: Regular rate and rhythm; No murmurs, rubs, or gallops  GI: : Soft, Nontender, Nondistended; Bowel sounds present  NERVOUS SYSTEM:  Alert & Oriented X3  EXTREMITIES:  2+ Peripheral Pulses, No clubbing, cyanosis, or edema  LYMPH: No lymphadenopathy noted  SKIN: No rashes or lesions  ENDOCRINOLOGY: No Thyromegaly  PSYCH: Appropriate    Labs:                              10.1   8.76  )-----------( 282      ( 2018 05:00 )             31.4                         9.8    8.26  )-----------( 292      ( 2018 09:19 )             30.7                         10.6   10.34 )-----------( 314      ( 2018 07:51 )             32.6                         11.0   11.52 )-----------( 333      ( 2018 05:15 )             33.6     01-31    139  |  100  |  28<H>  ----------------------------<  122<H>  3.8   |  22  |  1.56<H>      139  |  101  |  30<H>  ----------------------------<  178<H>  4.0   |  24  |  1.53<H>      144  |  103  |  37<H>  ----------------------------<  127<H>  3.4<L>   |  25  |  1.49<H>  28    140  |  100  |  44<H>  ----------------------------<  211<H>  3.8   |  21<L>  |  1.58<H>    Ca    8.9      2018 05:00  Ca    8.8      2018 09:16  Mg     1.9     31  Mg     2.0           CAPILLARY BLOOD GLUCOSE      POCT Blood Glucose.: 101 mg/dL (2018 08:43)  POCT Blood Glucose.: 260 mg/dL (2018 21:47)  POCT Blood Glucose.: 235 mg/dL (2018 17:22)  POCT Blood Glucose.: 188 mg/dL (2018 13:05)        Urinalysis Basic - ( 2018 15:20 )    Color: PLYEL / Appearance: CLEAR / S.018 / pH: 6.5  Gluc: >1000 / Ketone: NEGATIVE  / Bili: NEGATIVE / Urobili: NORMAL mg/dL   Blood: TRACE / Protein: 150 mg/dL / Nitrite: NEGATIVE   Leuk Esterase: NEGATIVE / RBC: 0-2 / WBC 0-2   Sq Epi: OCC / Non Sq Epi: x / Bacteria: x      D DImer    Cultures:       < from: Xray Chest 1 View- PORTABLE-Urgent (18 @ 15:33) >  TECHNIQUE: Portable frontal chest x-ray    COMPARISON: Chest x-ray from 2018. CT chest from 2018.    FINDINGS:    Status post median sternotomy, surgical clips overlie the mediastinum.  Previous pulmonary edema appears resolved. Hazy density at the lateral   left base is felt to be likely due to overlying breast soft tissue. No   focal lung consolidation seen.  There is no pneumothorax. There are no pleural effusions.   The cardiomediastinal silhouette cannot be adequately assessed on this   projection.    IMPRESSION:   Clear lungs.    Resolution of previous pulmonary edema and bilateral pleural effusions.              KENDALL GRANT M.D., RADIOLOGY RESIDENT  This document has been electronically signed.  CATRACHITA KEENAN M.D., ATTENDING RADIOLOGIST  This document has been electronically signed. 2018  4:07PM        < end of copied text >          < from: Xray Chest 1 View- PORTABLE-Urgent (18 @ 15:33) >  There is no pneumothorax. There are no pleural effusions.   The cardiomediastinal silhouette cannot be adequately assessed on this   projection.    IMPRESSION:   Clear lungs.    Resolution of previous pulmonary edema and bilateral pleural effusions.              KENDALL GRANT M.D., RADIOLOGY RESIDENT  This document has been electronically signed.  CATRACHITA KEENAN M.D., ATTENDING RADIOLOGIST  This document has been electronically signed. 2018  4:07PM        < end of copied text >          Rapid Respiratory Viral Panel Result         @ 14:15  Rapid RVP --  Coronovirus --  Adenovirus NOT DETECTED  Bordetella Pertussis NOT DETECTED  Chlamydia Pneumonia NOT DETECTED  Entero/RhinovirusNOT DETECTED  HKU1 Coronovirus --  HMPV Coronovirus NOT DETECTED  Influenza A NOT DETECTED (any subtype)  Influenza AH1 NOT DETECTED  Influenza AH1 2009 NOT DETECTED  Influenza AH3 NOT DETECTED  Influenza B NOT DETECTED  Mycoplasma Pneumoniae NOT DETECTED This nucleic acid amplification assay was performed using  the [employer] The following pathogens are tested  for: Adenovirus, Coronavirus 229E, Coronavirus HKU1,  Coronavirus NL63, Coronavirus OC43, Human Metapneumovirus  (HMPV), Rhinovirus/Enterovirus, Influenza A H1, Influenza A  H1 2009 (Pandemic H1 2009), Influenza A H3, Influenza A (Flu  A) subtype not identified, Influenza B, Parainfluenza Virus  (types 1, 2, 3, 4), Respiratory Syncytial Virus (RSV),  Bordetella pertussis, Chlamydophila pneumoniae, and  Mycoplasma pneumoniae. A negative FilmArray result does not  always exclude the possibility of viral or bacterial  infection. Laboratory results should always be interpreted  in the context of clinical findings.  NL63 Coronovirus --  OC43 Coronovirus --  Parainfluenza 1 NOT DETECTED  Parainfluenza 2 NOT DETECTED  Parainfluenza 3 NOT DETECTED  Parainfluenza 4 NOT DETECTED  Resp Syncytial Virus NOT DETECTED        Studies  Chest X-RAY  CT SCAN Chest   CT Abdomen  Venous Dopplers: LE:   Others

## 2018-01-31 NOTE — CONSULT NOTE ADULT - CONSULT REASON
medical management
CKD stage 3
Fever
High Blood Sugars/DMT2
Fever: sniffles:    history obtained from daughter

## 2018-01-31 NOTE — CONSULT NOTE ADULT - PROBLEM SELECTOR RECOMMENDATION 4
cont current meds
Non AG, monitor for now, getting diuresis
Will check TFTs, continue current Synthroid dose FU

## 2018-01-31 NOTE — PROGRESS NOTE ADULT - ASSESSMENT
1.	CHF decompensation, EF 32%  2.	MERVIN: renal function worsening likely hyperglycemia on lasix, FEurea<35%, now improved to baseline  3.	CKD 3, baseline 1.5-1.8  4.	CAD. cardio following  5.	Acidosis improved   6.	Hypokalemia    7.	fever RVP neg    PLAN:   1.	diuresing per cardio  2.	Monitor BMP.  1.	f/u cardio

## 2018-02-01 LAB
BUN SERPL-MCNC: 29 MG/DL — HIGH (ref 7–23)
CALCIUM SERPL-MCNC: 8.7 MG/DL — SIGNIFICANT CHANGE UP (ref 8.4–10.5)
CHLORIDE SERPL-SCNC: 104 MMOL/L — SIGNIFICANT CHANGE UP (ref 98–107)
CO2 SERPL-SCNC: 21 MMOL/L — LOW (ref 22–31)
CREAT SERPL-MCNC: 1.61 MG/DL — HIGH (ref 0.5–1.3)
GLUCOSE BLDC GLUCOMTR-MCNC: 145 MG/DL — HIGH (ref 70–99)
GLUCOSE BLDC GLUCOMTR-MCNC: 148 MG/DL — HIGH (ref 70–99)
GLUCOSE BLDC GLUCOMTR-MCNC: 186 MG/DL — HIGH (ref 70–99)
GLUCOSE BLDC GLUCOMTR-MCNC: 230 MG/DL — HIGH (ref 70–99)
GLUCOSE SERPL-MCNC: 145 MG/DL — HIGH (ref 70–99)
HCT VFR BLD CALC: 32.2 % — LOW (ref 34.5–45)
HGB BLD-MCNC: 10.4 G/DL — LOW (ref 11.5–15.5)
MAGNESIUM SERPL-MCNC: 2 MG/DL — SIGNIFICANT CHANGE UP (ref 1.6–2.6)
MCHC RBC-ENTMCNC: 27.5 PG — SIGNIFICANT CHANGE UP (ref 27–34)
MCHC RBC-ENTMCNC: 32.3 % — SIGNIFICANT CHANGE UP (ref 32–36)
MCV RBC AUTO: 85.2 FL — SIGNIFICANT CHANGE UP (ref 80–100)
NRBC # FLD: 0 — SIGNIFICANT CHANGE UP
PLATELET # BLD AUTO: 295 K/UL — SIGNIFICANT CHANGE UP (ref 150–400)
PMV BLD: 9.5 FL — SIGNIFICANT CHANGE UP (ref 7–13)
POTASSIUM SERPL-MCNC: 4.5 MMOL/L — SIGNIFICANT CHANGE UP (ref 3.5–5.3)
POTASSIUM SERPL-SCNC: 4.5 MMOL/L — SIGNIFICANT CHANGE UP (ref 3.5–5.3)
RBC # BLD: 3.78 M/UL — LOW (ref 3.8–5.2)
RBC # FLD: 15.2 % — HIGH (ref 10.3–14.5)
SODIUM SERPL-SCNC: 140 MMOL/L — SIGNIFICANT CHANGE UP (ref 135–145)
WBC # BLD: 8.03 K/UL — SIGNIFICANT CHANGE UP (ref 3.8–10.5)
WBC # FLD AUTO: 8.03 K/UL — SIGNIFICANT CHANGE UP (ref 3.8–10.5)

## 2018-02-01 PROCEDURE — 99232 SBSQ HOSP IP/OBS MODERATE 35: CPT

## 2018-02-01 PROCEDURE — 71045 X-RAY EXAM CHEST 1 VIEW: CPT | Mod: 26

## 2018-02-01 RX ORDER — FUROSEMIDE 40 MG
40 TABLET ORAL DAILY
Qty: 0 | Refills: 0 | Status: DISCONTINUED | OUTPATIENT
Start: 2018-02-01 | End: 2018-02-01

## 2018-02-01 RX ORDER — FUROSEMIDE 40 MG
40 TABLET ORAL DAILY
Qty: 0 | Refills: 0 | Status: DISCONTINUED | OUTPATIENT
Start: 2018-02-02 | End: 2018-02-09

## 2018-02-01 RX ADMIN — Medication 650 MILLIGRAM(S): at 13:33

## 2018-02-01 RX ADMIN — FAMOTIDINE 20 MILLIGRAM(S): 10 INJECTION INTRAVENOUS at 11:24

## 2018-02-01 RX ADMIN — Medication 40 MILLIGRAM(S): at 11:22

## 2018-02-01 RX ADMIN — Medication 30 MILLIGRAM(S): at 06:08

## 2018-02-01 RX ADMIN — TICAGRELOR 90 MILLIGRAM(S): 90 TABLET ORAL at 17:31

## 2018-02-01 RX ADMIN — Medication 650 MILLIGRAM(S): at 21:50

## 2018-02-01 RX ADMIN — Medication 650 MILLIGRAM(S): at 06:09

## 2018-02-01 RX ADMIN — Medication 30 MILLIGRAM(S): at 18:15

## 2018-02-01 RX ADMIN — Medication 24 UNIT(S): at 13:32

## 2018-02-01 RX ADMIN — TICAGRELOR 90 MILLIGRAM(S): 90 TABLET ORAL at 06:09

## 2018-02-01 RX ADMIN — ATORVASTATIN CALCIUM 40 MILLIGRAM(S): 80 TABLET, FILM COATED ORAL at 21:51

## 2018-02-01 RX ADMIN — Medication 500 MILLIGRAM(S): at 11:24

## 2018-02-01 RX ADMIN — MONTELUKAST 10 MILLIGRAM(S): 4 TABLET, CHEWABLE ORAL at 21:50

## 2018-02-01 RX ADMIN — Medication 81 MILLIGRAM(S): at 11:24

## 2018-02-01 RX ADMIN — HEPARIN SODIUM 5000 UNIT(S): 5000 INJECTION INTRAVENOUS; SUBCUTANEOUS at 17:38

## 2018-02-01 RX ADMIN — Medication 1 SPRAY(S): at 09:14

## 2018-02-01 RX ADMIN — Medication 24 UNIT(S): at 17:30

## 2018-02-01 RX ADMIN — SODIUM CHLORIDE 3 MILLILITER(S): 9 INJECTION INTRAMUSCULAR; INTRAVENOUS; SUBCUTANEOUS at 05:52

## 2018-02-01 RX ADMIN — Medication 24 UNIT(S): at 09:12

## 2018-02-01 RX ADMIN — INSULIN GLARGINE 20 UNIT(S): 100 INJECTION, SOLUTION SUBCUTANEOUS at 09:10

## 2018-02-01 RX ADMIN — Medication 12.5 MILLIGRAM(S): at 06:09

## 2018-02-01 RX ADMIN — Medication 30 MILLIGRAM(S): at 00:24

## 2018-02-01 RX ADMIN — Medication 325 MILLIGRAM(S): at 09:13

## 2018-02-01 RX ADMIN — Medication 50 MICROGRAM(S): at 06:08

## 2018-02-01 RX ADMIN — SODIUM CHLORIDE 3 MILLILITER(S): 9 INJECTION INTRAMUSCULAR; INTRAVENOUS; SUBCUTANEOUS at 21:51

## 2018-02-01 RX ADMIN — SODIUM CHLORIDE 3 MILLILITER(S): 9 INJECTION INTRAMUSCULAR; INTRAVENOUS; SUBCUTANEOUS at 13:31

## 2018-02-01 RX ADMIN — Medication 2: at 09:11

## 2018-02-01 RX ADMIN — INSULIN GLARGINE 68 UNIT(S): 100 INJECTION, SOLUTION SUBCUTANEOUS at 21:49

## 2018-02-01 RX ADMIN — HEPARIN SODIUM 5000 UNIT(S): 5000 INJECTION INTRAVENOUS; SUBCUTANEOUS at 06:09

## 2018-02-01 RX ADMIN — Medication 650 MILLIGRAM(S): at 10:02

## 2018-02-01 NOTE — PROGRESS NOTE ADULT - ASSESSMENT
71 f with  HTN, CKD, Hyperlipidemia, DM-II, CAD s/p CABG x 3, s/p stents, was admitted 1/18 for CHF exacerbation on 1/30 developed fever, cough, rhinorrhea, sore throat and repeat RVP is positive for flu, pt was started on tamiflu    influenza    c/w tamiflu to compete the 5 day course

## 2018-02-01 NOTE — PROGRESS NOTE ADULT - ASSESSMENT
CHF exacerbation.  Plan: telemonitor   strict I & O's, daily wts  Fluid restriction  diuresis as per cards  further edwards as per cards     Diabetes mellitus, type 2.  Plan: monitor fs   basal/bolus   endocrine following     Hyperlipidemia.  Plan: cw statin     Hypothyroidism.  Plan: cw levothyroxine.     Influenza Plan cw tamiflu

## 2018-02-01 NOTE — PROGRESS NOTE ADULT - SUBJECTIVE AND OBJECTIVE BOX
Patient is a 72y old  Female who presents with a chief complaint of SOB (25 Jan 2018 15:14)      Any change in ROS: feeling much better     DIL at bedside:     MEDICATIONS  (STANDING):  ascorbic acid 500 milliGRAM(s) Oral daily  aspirin enteric coated 81 milliGRAM(s) Oral daily  atorvastatin 40 milliGRAM(s) Oral at bedtime  dextrose 5%. 1000 milliLiter(s) (50 mL/Hr) IV Continuous <Continuous>  dextrose 50% Injectable 12.5 Gram(s) IV Push once  dextrose 50% Injectable 25 Gram(s) IV Push once  dextrose 50% Injectable 25 Gram(s) IV Push once  famotidine    Tablet 20 milliGRAM(s) Oral daily  furosemide   Injectable 40 milliGRAM(s) IV Push daily  heparin  Injectable 5000 Unit(s) SubCutaneous every 12 hours  insulin glargine Injectable (LANTUS) 20 Unit(s) SubCutaneous every morning  insulin glargine Injectable (LANTUS) 68 Unit(s) SubCutaneous at bedtime  insulin lispro (HumaLOG) corrective regimen sliding scale   SubCutaneous three times a day before meals  insulin lispro (HumaLOG) corrective regimen sliding scale   SubCutaneous at bedtime  insulin lispro Injectable (HumaLOG) 24 Unit(s) SubCutaneous three times a day before meals  levothyroxine 50 MICROGram(s) Oral daily  metoprolol succinate ER 12.5 milliGRAM(s) Oral daily  montelukast 10 milliGRAM(s) Oral at bedtime  oseltamivir 30 milliGRAM(s) Oral two times a day  sodium bicarbonate 650 milliGRAM(s) Oral three times a day  sodium chloride 0.9% lock flush 3 milliLiter(s) IV Push every 8 hours  ticagrelor 90 milliGRAM(s) Oral two times a day    MEDICATIONS  (PRN):  acetaminophen   Tablet 650 milliGRAM(s) Oral every 6 hours PRN For Temp greater than 38 C (100.4 F)  acetaminophen   Tablet. 650 milliGRAM(s) Oral every 6 hours PRN Mild, moderate and severe pain  benzocaine 15 mG/menthol 3.6 mG Lozenge 1 Lozenge Oral every 6 hours PRN Sore Throat  dextrose Gel 1 Dose(s) Oral once PRN Blood Glucose LESS THAN 70 milliGRAM(s)/deciliter  glucagon  Injectable 1 milliGRAM(s) IntraMuscular once PRN Glucose LESS THAN 70 milligrams/deciliter  guaiFENesin    Syrup 100 milliGRAM(s) Oral every 6 hours PRN Cough  sodium chloride 0.65% Nasal 1 Spray(s) Both Nostrils three times a day PRN Nasal Congestion    Vital Signs Last 24 Hrs  T(C): 36.4 (01 Feb 2018 13:11), Max: 36.9 (31 Jan 2018 21:22)  T(F): 97.5 (01 Feb 2018 13:11), Max: 98.4 (31 Jan 2018 21:22)  HR: 89 (01 Feb 2018 13:11) (87 - 97)  BP: 107/54 (01 Feb 2018 13:11) (107/54 - 127/58)  BP(mean): --  RR: 18 (01 Feb 2018 13:11) (18 - 18)  SpO2: 100% (01 Feb 2018 13:11) (98% - 100%)    I&O's Summary    31 Jan 2018 07:01  -  01 Feb 2018 07:00  --------------------------------------------------------  IN: 475 mL / OUT: 0 mL / NET: 475 mL    01 Feb 2018 07:01  -  01 Feb 2018 15:25  --------------------------------------------------------  IN: 1020 mL / OUT: 0 mL / NET: 1020 mL          Physical Exam:   GENERAL: NAD, well-groomed, well-developed  HEENT: MOHSEN/   Atraumatic, Normocephalic  ENMT: No tonsillar erythema, exudates, or enlargement; Moist mucous membranes, Good dentition, No lesions  NECK: Supple, No JVD, Normal thyroid  CHEST/LUNG: Clear to auscultaion, ; No rales, rhonchi, wheezing, or rubs  CVS: Regular rate and rhythm; No murmurs, rubs, or gallops  GI: : Soft, Nontender, Nondistended; Bowel sounds present  NERVOUS SYSTEM:  Alert & Oriented X3, Good concentration; Motor Strength 5/5 B/L upper and lower extremities; DTRs 2+ intact and symmetric  EXTREMITIES:  2+ Peripheral Pulses, No clubbing, cyanosis, or edema  LYMPH: No lymphadenopathy noted  SKIN: No rashes or lesions  ENDOCRINOLOGY: No Thyromegaly  PSYCH: Appropriate    Labs:                              10.4   8.03  )-----------( 295      ( 01 Feb 2018 06:05 )             32.2                         10.1   8.76  )-----------( 282      ( 31 Jan 2018 05:00 )             31.4                         9.8    8.26  )-----------( 292      ( 30 Jan 2018 09:19 )             30.7                         10.6   10.34 )-----------( 314      ( 29 Jan 2018 07:51 )             32.6     02-01    140  |  104  |  29<H>  ----------------------------<  145<H>  4.5   |  21<L>  |  1.61<H>  01-31    139  |  100  |  28<H>  ----------------------------<  122<H>  3.8   |  22  |  1.56<H>  01-30    139  |  101  |  30<H>  ----------------------------<  178<H>  4.0   |  24  |  1.53<H>  01-29    144  |  103  |  37<H>  ----------------------------<  127<H>  3.4<L>   |  25  |  1.49<H>    Ca    8.7      01 Feb 2018 06:05  Ca    8.9      31 Jan 2018 05:00  Mg     2.0     02-01  Mg     1.9     01-31      CAPILLARY BLOOD GLUCOSE      POCT Blood Glucose.: 148 mg/dL (01 Feb 2018 13:03)  POCT Blood Glucose.: 186 mg/dL (01 Feb 2018 08:52)  POCT Blood Glucose.: 205 mg/dL (31 Jan 2018 22:09)  POCT Blood Glucose.: 207 mg/dL (31 Jan 2018 17:13)            Cultures:           Wound culture:                01-30 @ 15:33  Organism --  Culture w/ gram stain --  Specimen Source BLOOD VENOUS      Abscess culture:             01-30 @ 15:33  Organism --  Gram Stain --  Specimen Source BLOOD VENOUS        Tissue culture:           01-30 @ 15:33  Organism --  Gram Stain --  Specimen Source BLOOD VENOUS      Body Fluid Smear & Culture:                        01-30 @ 15:33  AFB Smear  --  Culture Acid Fast Body Fluid w/ Smear  --  Culture Acid Fast Smear Concentrated   --    Culture Results:     --  Specimen Source BLOOD VENOUS        Rapid Respiratory Viral Panel Result        01-31 @ 20:56  Rapid RVP --  Coronovirus --  Adenovirus NOT DETECTED  Bordetella Pertussis NOT DETECTED  Chlamydia Pneumonia NOT DETECTED  Entero/RhinovirusNOT DETECTED  HKU1 Coronovirus --  HMPV Coronovirus NOT DETECTED  Influenza A --  Influenza AH1 NOT DETECTED  Influenza AH1 2009 NOT DETECTED  Influenza AH3 POSITIVE  Influenza B NOT DETECTED  Mycoplasma Pneumoniae NOT DETECTED This nucleic acid amplification assay was performed using  the [employer]. The following pathogens are tested  for: Adenovirus, Coronavirus 229E, Coronavirus HKU1,  Coronavirus NL63, Coronavirus OC43, Human Metapneumovirus  (HMPV), Rhinovirus/Enterovirus, Influenza A H1, Influenza A  H1 2009 (Pandemic H1 2009), Influenza A H3, Influenza A (Flu  A) subtype not identified, Influenza B, Parainfluenza Virus  (types 1, 2, 3, 4), Respiratory Syncytial Virus (RSV),  Bordetella pertussis, Chlamydophila pneumoniae, and  Mycoplasma pneumoniae. A negative FilmArray result does not  always exclude the possibility of viral or bacterial  infection. Laboratory results should always be interpreted  in the context of clinical findings.  NL63 Coronovirus --  OC43 Coronovirus --  Parainfluenza 1 NOT DETECTED  Parainfluenza 2 NOT DETECTED  Parainfluenza 3 NOT DETECTED  Parainfluenza 4 NOT DETECTED  Resp Syncytial Virus NOT DETECTED    Rapid Respiratory Viral Panel Result        01-30 @ 14:15  Rapid RVP --  Coronovirus --  Adenovirus NOT DETECTED  Bordetella Pertussis NOT DETECTED  Chlamydia Pneumonia NOT DETECTED  Entero/RhinovirusNOT DETECTED  HKU1 Coronovirus --  HMPV Coronovirus NOT DETECTED  Influenza A NOT DETECTED (any subtype)  Influenza AH1 NOT DETECTED  Influenza AH1 2009 NOT DETECTED  Influenza AH3 NOT DETECTED  Influenza B NOT DETECTED  Mycoplasma Pneumoniae NOT DETECTED This nucleic acid amplification assay was performed using  the [employer]. The following pathogens are tested  for: Adenovirus, Coronavirus 229E, Coronavirus HKU1,  Coronavirus NL63, Coronavirus OC43, Human Metapneumovirus  (HMPV), Rhinovirus/Enterovirus, Influenza A H1, Influenza A  H1 2009 (Pandemic H1 2009), Influenza A H3, Influenza A (Flu  A) subtype not identified, Influenza B, Parainfluenza Virus  (types 1, 2, 3, 4), Respiratory Syncytial Virus (RSV),  Bordetella pertussis, Chlamydophila pneumoniae, and  Mycoplasma pneumoniae. A negative FilmArray result does not  always exclude the possibility of viral or bacterial  infection. Laboratory results should always be interpreted  in the context of clinical findings.  NL63 Coronovirus --  OC43 Coronovirus --  Parainfluenza 1 NOT DETECTED  Parainfluenza 2 NOT DETECTED  Parainfluenza 3 NOT DETECTED  Parainfluenza 4 NOT DETECTED  Resp Syncytial Virus NOT DETECTED          Studies  Chest X-RAY  CT SCAN Chest   Venous Dopplers: LE:   CT Abdomen  Others        < from: Xray Chest 1 View- PORTABLE-Routine (02.01.18 @ 08:05) >    COMPARISON: [facility] chest x-ray from January 30, 2018. CT scan of the chest   from January 31, 2018.    TECHNIQUE:   AP Portable chest x-ray.    INTERPRETATION:     Heart size and the mediastinum cannot be accurately evaluated on this   projection. Coronary artery calcification and/or stent is seen.  Median sternotomy sutures and surgical clips are again seen.  There is minimal linear atelectasis versus scar in the left lower lung.   The lungs are otherwise clear.  No pleural effusion is noted.  The visualized bony structures appear intact.            IMPRESSION:  Minimal linear atelectasis versus scar in the left lower   lung. Otherwise clear lungs.   Please see report of the CT scan of the chest for any further detail.          < from: CT Chest No Cont (01.31.18 @ 17:59) >    COMPARISON: CT chest from January 20, 2018.    FINDINGS:    AIRWAYS, LUNGS AND PLEURA: Patent central airways. Previously noted   groundglass opacities within bothlungs are no longer present. Previously   noted bilateral pleural effusions have resolved.  There is no pneumothorax.  VESSELS: Thoracic aorta is normal in caliber. The aorta and coronary   arteries are calcified.  HEART: Cardiomegaly. There is no pericardial effusion.   MEDIASTINUM: No significant mediastinal or hilar adenopathy is seen.   NECK AND CHEST WALL: The visualized thyroid is homogeneous. There is no   significant supraclavicular or axillary lymphadenopathy.  UPPER ABDOMEN: The visualized upper abdomen is unremarkable.  BONES: Status post median sternotomy.    IMPRESSION:  Resolution of the previously noted bilateral pleural effusions and   groundglass opacities within both lungs when compared to previous exam.              KENDALL GRANT M.D., RADIOLOGY RESIDENT  This document has been electronically signed.  IRVING MERCEDES M.D., ATTENDING RADIOLOGIST  This document has been electronically signed. Feb 1 2018 12:23PM    < end of copied text >          CATRACHITA KEENAN M.D., ATTENDING RADIOLOGIST  This document has been electronically signed. Feb 1 2018 12:00PM    < end of copied text >

## 2018-02-01 NOTE — PROGRESS NOTE ADULT - SUBJECTIVE AND OBJECTIVE BOX
Patient is a 72y old  Female who presents with a chief complaint of SOB (25 Jan 2018 15:14)  NAD      INTERVAL HPI/OVERNIGHT EVENTS:  T(C): 36.4 (02-01-18 @ 13:11), Max: 36.9 (01-31-18 @ 21:22)  HR: 89 (02-01-18 @ 13:11) (87 - 97)  BP: 107/54 (02-01-18 @ 13:11) (107/54 - 127/58)  RR: 18 (02-01-18 @ 13:11) (18 - 18)  SpO2: 100% (02-01-18 @ 13:11) (98% - 100%)  Wt(kg): --  I&O's Summary    31 Jan 2018 07:01  -  01 Feb 2018 07:00  --------------------------------------------------------  IN: 475 mL / OUT: 0 mL / NET: 475 mL    01 Feb 2018 07:01  -  01 Feb 2018 17:48  --------------------------------------------------------  IN: 1020 mL / OUT: 0 mL / NET: 1020 mL        LABS:                        10.4   8.03  )-----------( 295      ( 01 Feb 2018 06:05 )             32.2     02-01    140  |  104  |  29<H>  ----------------------------<  145<H>  4.5   |  21<L>  |  1.61<H>    Ca    8.7      01 Feb 2018 06:05  Mg     2.0     02-01          CAPILLARY BLOOD GLUCOSE      POCT Blood Glucose.: 145 mg/dL (01 Feb 2018 17:03)  POCT Blood Glucose.: 148 mg/dL (01 Feb 2018 13:03)  POCT Blood Glucose.: 186 mg/dL (01 Feb 2018 08:52)  POCT Blood Glucose.: 205 mg/dL (31 Jan 2018 22:09)            MEDICATIONS  (STANDING):  ascorbic acid 500 milliGRAM(s) Oral daily  aspirin enteric coated 81 milliGRAM(s) Oral daily  atorvastatin 40 milliGRAM(s) Oral at bedtime  dextrose 5%. 1000 milliLiter(s) (50 mL/Hr) IV Continuous <Continuous>  dextrose 50% Injectable 12.5 Gram(s) IV Push once  dextrose 50% Injectable 25 Gram(s) IV Push once  dextrose 50% Injectable 25 Gram(s) IV Push once  famotidine    Tablet 20 milliGRAM(s) Oral daily  heparin  Injectable 5000 Unit(s) SubCutaneous every 12 hours  insulin glargine Injectable (LANTUS) 20 Unit(s) SubCutaneous every morning  insulin glargine Injectable (LANTUS) 68 Unit(s) SubCutaneous at bedtime  insulin lispro (HumaLOG) corrective regimen sliding scale   SubCutaneous three times a day before meals  insulin lispro (HumaLOG) corrective regimen sliding scale   SubCutaneous at bedtime  insulin lispro Injectable (HumaLOG) 24 Unit(s) SubCutaneous three times a day before meals  levothyroxine 50 MICROGram(s) Oral daily  metoprolol succinate ER 12.5 milliGRAM(s) Oral daily  montelukast 10 milliGRAM(s) Oral at bedtime  oseltamivir 30 milliGRAM(s) Oral two times a day  sodium bicarbonate 650 milliGRAM(s) Oral three times a day  sodium chloride 0.9% lock flush 3 milliLiter(s) IV Push every 8 hours  ticagrelor 90 milliGRAM(s) Oral two times a day    MEDICATIONS  (PRN):  acetaminophen   Tablet 650 milliGRAM(s) Oral every 6 hours PRN For Temp greater than 38 C (100.4 F)  acetaminophen   Tablet. 650 milliGRAM(s) Oral every 6 hours PRN Mild, moderate and severe pain  benzocaine 15 mG/menthol 3.6 mG Lozenge 1 Lozenge Oral every 6 hours PRN Sore Throat  dextrose Gel 1 Dose(s) Oral once PRN Blood Glucose LESS THAN 70 milliGRAM(s)/deciliter  glucagon  Injectable 1 milliGRAM(s) IntraMuscular once PRN Glucose LESS THAN 70 milligrams/deciliter  guaiFENesin    Syrup 100 milliGRAM(s) Oral every 6 hours PRN Cough  sodium chloride 0.65% Nasal 1 Spray(s) Both Nostrils three times a day PRN Nasal Congestion          PHYSICAL EXAM:  GENERAL: NAD, well-groomed, well-developed  HEAD:  Atraumatic, Normocephalic  CHEST/LUNG: Clear to percussion bilaterally; No rales, rhonchi, wheezing, or rubs  HEART: Regular rate and rhythm; No murmurs, rubs, or gallops  ABDOMEN: Soft, Nontender, Nondistended; Bowel sounds present  EXTREMITIES:  2+ Peripheral Pulses, No clubbing, cyanosis, or edema  LYMPH: No lymphadenopathy noted  SKIN: No rashes or lesions    Care Discussed with Consultants/Other Providers [ ] YES  [ ] NO

## 2018-02-01 NOTE — PROGRESS NOTE ADULT - SUBJECTIVE AND OBJECTIVE BOX
Subjective:  no CP or SOB, c/o nasal congestion, now with +FLU      MEDICATIONS  (STANDING):  ascorbic acid 500 milliGRAM(s) Oral daily  aspirin enteric coated 81 milliGRAM(s) Oral daily  atorvastatin 40 milliGRAM(s) Oral at bedtime  dextrose 5%. 1000 milliLiter(s) (50 mL/Hr) IV Continuous <Continuous>  dextrose 50% Injectable 12.5 Gram(s) IV Push once  dextrose 50% Injectable 25 Gram(s) IV Push once  dextrose 50% Injectable 25 Gram(s) IV Push once  famotidine    Tablet 20 milliGRAM(s) Oral daily  furosemide   Injectable 40 milliGRAM(s) IV Push daily  heparin  Injectable 5000 Unit(s) SubCutaneous every 12 hours  insulin glargine Injectable (LANTUS) 20 Unit(s) SubCutaneous every morning  insulin glargine Injectable (LANTUS) 68 Unit(s) SubCutaneous at bedtime  insulin lispro (HumaLOG) corrective regimen sliding scale   SubCutaneous three times a day before meals  insulin lispro (HumaLOG) corrective regimen sliding scale   SubCutaneous at bedtime  insulin lispro Injectable (HumaLOG) 24 Unit(s) SubCutaneous three times a day before meals  levothyroxine 50 MICROGram(s) Oral daily  metoprolol succinate ER 12.5 milliGRAM(s) Oral daily  montelukast 10 milliGRAM(s) Oral at bedtime  oseltamivir 30 milliGRAM(s) Oral two times a day  sodium bicarbonate 650 milliGRAM(s) Oral three times a day  sodium chloride 0.9% lock flush 3 milliLiter(s) IV Push every 8 hours  ticagrelor 90 milliGRAM(s) Oral two times a day    MEDICATIONS  (PRN):  acetaminophen   Tablet 650 milliGRAM(s) Oral every 6 hours PRN For Temp greater than 38 C (100.4 F)  acetaminophen   Tablet. 650 milliGRAM(s) Oral every 6 hours PRN Mild, moderate and severe pain  benzocaine 15 mG/menthol 3.6 mG Lozenge 1 Lozenge Oral every 6 hours PRN Sore Throat  dextrose Gel 1 Dose(s) Oral once PRN Blood Glucose LESS THAN 70 milliGRAM(s)/deciliter  glucagon  Injectable 1 milliGRAM(s) IntraMuscular once PRN Glucose LESS THAN 70 milligrams/deciliter  guaiFENesin    Syrup 100 milliGRAM(s) Oral every 6 hours PRN Cough  sodium chloride 0.65% Nasal 1 Spray(s) Both Nostrils three times a day PRN Nasal Congestion      LABS:                        10.4   8.03  )-----------( 295      ( 01 Feb 2018 06:05 )             32.2     140  |  104  |  29<H>  ----------------------------<  145<H>  4.5   |  21<L>  |  1.61<H>    Ca    8.7      01 Feb 2018 06:05  Mg     2.0     02-01    Creatinine Trend: 1.61<--, 1.56<--, 1.53<--, 1.49<--, 1.58<--, 1.60<--     PHYSICAL EXAM  Vital Signs Last 24 Hrs  T(C): 36.4 (01 Feb 2018 13:11), Max: 36.9 (31 Jan 2018 21:22)  T(F): 97.5 (01 Feb 2018 13:11), Max: 98.4 (31 Jan 2018 21:22)  HR: 89 (01 Feb 2018 13:11) (87 - 97)  BP: 107/54 (01 Feb 2018 13:11) (107/54 - 127/58)  RR: 18 (01 Feb 2018 13:11) (18 - 18)  SpO2: 100% (01 Feb 2018 13:11) (98% - 100%)    Cardiovascular: S1S2 RRR  Respiratory: CTA BL with decreased breath sounds B/L LLs  Gastrointestinal:  Soft, Non-tender, + BS	  Extremities No edema, cyanosis or clubbing  B/L LE's     DIAGNOSTIC DATA:     TELEMETRY: SR 	       < from: Cardiac Cath Lab - Adult (10.13.17 @ 10:40) >  VENTRICLES: No left ventriculogram was performed.  CORONARY VESSELS: The coronary circulation is right dominant.  LM:   --  LM: Normal.  LAD:   --  Proximal LAD: There was a diffuse 60 % stenosis.  --  Mid LAD: There was a 100 % stenosis with the distal vessel being filled  via LIMA-LAD.  CX:   --  Circumflex: The vessel was small sized. Angiography showed mild  atherosclerosis withno flow limiting lesions.  --  OM1: Angiography showed mild atherosclerosis with no flow limiting  lesions.  RI:   --  Ostial ramus intermedius: There was a tubular 80 % stenosis.  There was PARUL grade 3 flow through the vessel (brisk flow).  RCA:   --  Proximal RCA: Angiography showed mild atherosclerosis with no  flow limiting lesions.  --  Mid RCA: There was a 100 % stenosis with the distal vessel being filled  via collaterals. This lesion is a chronic total occlusion.  GRAFTS:   --  Graft to the LAD: The graft was a LIMA. Graft angiography  showed minor luminal irregularities. Distal vessel angiography showed  minor luminal irregularities.  AORTA: Ascending aorta: Normal. No additional grafts visualized in  aortogram.  COMPLICATIONS: There were no complications.  DIAGNOSTIC RECOMMENDATIONS: Coronary angiogram demonstrates patent  LIMA-LAD, closed SVG grafts, and a severe stenosis in the ostial Ramus.  Will perform staged PCI to RI when Cr stable and renally optimized.  Prepared and signed by  Corie Ga M.D.  Signed 10/17/2017 13:49:15    < end of copied text >    < from: Cardiac Cath Lab - Adult (11.17.17 @ 11:48) >  PROCEDURE:  --  Intervention on ramus intermedius: drug-eluting stent.    VENTRICLES: No LV gram was performed; however, a recent echocardiogram  demonstrated normal global and regional LV function.  CORONARY VESSELS: The coronary circulation is right dominant.  LM:   --  LM: Angiography showed minor luminal irregularities with no flow  limiting lesions.  LAD:   --  Ostial LAD: There was a 100 % stenosis.  CX:   --  Circumflex: This vessel was not injected, but was visualized  during a prior cardiac catheterization.  RI:   --  Ramus intermedius: There was a 95 % stenosis.  RCA:   --  RCA: This vessel was not injected.  COMPLICATIONS: There were no complications.  DIAGNOSTIC RECOMMENDATIONS: The patient should continue with the present  medications. will add plavix 75mg po once a day/ will add ECASA 325mg po  once day.  Prepared and signed by  Roberta Quick M.D.  Signed 11/17/2017 13:16:01    < end of copied text >    < from: TTE with Doppler (w/Cont) (11.25.17 @ 12:33) >  CONCLUSIONS:  1. Mitral annular calcification, otherwise normal mitral  valve. Mild mitral regurgitation.  2. Normal left ventricular internal dimensions and wall  thicknesses.  3. Endocardium not well visualized; grossly moderate to  severe segmental left ventricularsystolic dysfunction.  Hypokinesis of the inferior, lateral, and inferolateral  walls.  Endocardial visualization enhanced with intravenous  injection of echo contrast (Definity).  No LV thrombus  seen.  4. The right ventricle is not well visualized;grossly  normal right ventricular systolic function.  ------------------------------------------------------------------------  Confirmed on  11/25/2017 - 14:44:41 by Jose Lucia M.D.    < end of copied text >    < from: Cardiac Cath Lab - Adult (12.05.17 @ 12:48) >  VENTRICLES: No LV gram was performed; however, a recent echocardiogram  demonstrated an EF of 36 %.  CORONARY VESSELS: The coronary circulation is right dominant.  LM:   --  Distal left main: Angiography showed minor luminal irregularities  with no flow limiting lesions.  LAD:   --  Mid LAD: There was a 100 % stenosis with the distal vessel being  filled via LIMA-LAD.  CX:   --  Proximal circumflex: There was a tubular 20 % stenosis.  --  Mid circumflex: Angiography showed minor luminal irregularities with no  flow limiting lesions.  --  Distal circumflex: Angiography showed minor luminal irregularities with  no flow limiting lesions.  --  OM1: The vessel was small sized. There was a discrete 60 % stenosis.  RI:   --  Ostial ramus intermedius: Angiography showed minor luminal  irregularities with no flow limiting lesions. There was no significant  restenosis in RI stent.  --  Proximal ramus intermedius: Angiography showed minor luminal  irregularities with no flow limiting lesions.  --  Mid ramus intermedius: There was a tubular 80 % stenosis. The lesion  was associated with a small filling defect consistent with thrombus.  RCA:   --  Mid RCA: There was a 100 % stenosis with the distal vessel being  filled via collaterals from the LAD.  GRAFTS:   --  Graft to the LAD: The graft was a LIMA. Graft angiography  showed minor luminal irregularities. Distal vessel angiography showed  minor luminalirregularities.  COMPLICATIONS: There were no complications.  DIAGNOSTIC RECOMMENDATIONS: Coronary angiogram demonstrates a severe  stenosis in the ramus intermedius that is the cause of the patient's  clinical presentation. Will therefore perform PCI to the RI.  INTERVENTIONAL RECOMMENDATIONS: S/p successful CORNELIA to the Ramus  Intermedius. The patient should continue with ASA and Brilinta for at  least 12 months.  Prepared and signed by  Corie Ga M.D.  Signed 12/06/2017 17:06:30    < from: CT Chest No Cont (01.20.18 @ 09:55) >  IMPRESSION:   Mild pulmonary edema with small simple bilateral pleural effusions, right   greater than left, with no evidence of loculation.    < end of copied text >    < from: TTE with Doppler (w/Cont) (01.23.18 @ 12:31) >  CONCLUSIONS:  1. Mitral annular calcification, otherwise normal mitral  valve. Mild-moderate mitral regurgitation.  2. Calcified trileaflet aortic valve with normal opening.  Mild aortic regurgitation.  3. Normal left ventricular internal dimensions and wall  thicknesses.  4. Endocardium not well visualized; grossly severe global  left ventricular systolic dysfunction.  Endocardial  visualization enhanced with intravenous injection of echo  contrast (Definity).  5. The right ventricle is not well visualized; grossly  normal right ventricular systolic function.  *** Compared with echocardiogram of 11/25/2017, no  significant changes noted.  ------------------------------------------------------------------------  Confirmed on  1/23/2018 - 14:35:49 by Jose Lucia M.D.    < end of copied text >    < from: Nuclear Stress Test-Pharmacologic (01.28.18 @ 12:31) >  IMPRESSIONS:Abnormal Study  * Myocardial Perfusion SPECT results are abnormal.  * There is a medium sized, mild to moderate defect in  anterior wall that is reversible, suggestive of  ischemia.There are large, moderate to severe defects in  inferolateral, lateral walls that are fixed, suggestive of  infarct.  * Post-stress gated wall motion analysis was performed  (LVEF = 33 %;LVEDV = 116 ml.), revealing severe overall  hypokinesis. There was focal inferiolateral akinesis and  severe lateral hypokinesis with absence of systolic  thickening. The best motion was in the septum.  *** Compared with Nuclear/Stress test of 11/5/2014, the  observed defect are now more extensive, suggesting  progression of disease. Prior LVEF was 39% with EDV 77 mL.  ------------------------------------------------------------------------  Revised on  1/29/2018 - 16:44:19 by Lorenzo Covarrubias M.D.  Confirmed on  1/30/2018 - 15:45:44 by José Hansen M.D.  < end of copied text >    < from: CT Chest No Cont (01.31.18 @ 17:59) >  IMPRESSION:  Resolution of the previously noted bilateral pleural effusions and   groundglass opacities within both lungs when compared to previous exam.    < end of copied text >    ASSESSMENT/PLAN: 	72y Female w/ PMH HTN, CKD, Hyperlipidemia, DM-II, CAD s/p 3V CABG  (LIMA to LAD, SVG to OM, SVG to PDA) at [facility] 2014, s/p CORNELIA TO RI 11/17/17, NSTEMI 12/2017 s/p C on 12/5 and CORNELIA to [facility], TTE at that time revealed grossly moderate to severe LV dysfunction with hypokinesis of the inferior, lateral and inferolateral with an EF of 36% admitted with acute on chronic systolic CHF, s/p IV diureses, s/p NST as above, now s/p fever 1/30/18--RVP negative     --Cont PO Lasix, patient is Euvolemic  --mildly elevated trops noted in setting of CKD & CHF exacerbation  - no need for urgent repeat cath at this time  --Repeat TTE unchanged from prior. EF 32%  --Cont. DAPT for recent CORNELIA  --+FLU , now on tamiflu  --NST noted above, will d/w patient and family regarding further cardiac work up once infectious issues resolve    Juliane Fitch PA-C  [facility]  [address], [address]   [place] [address]  office [phone]  pager [phone]

## 2018-02-01 NOTE — PROGRESS NOTE ADULT - ASSESSMENT
1.	CHF decompensation, EF 32%  2.	MERVIN: renal function worsening likely hyperglycemia on lasix, FEurea<35%, now improved to baseline  3.	CKD 3, baseline 1.5-1.8  4.	CAD. cardio following  5.	Acidosis improved   6.	Hypokalemia    7.	fever +flu on Tamiflu     PLAN:   1.	last dose of lasix 40mg iv on 1/29, pt was not on diuretic since. feeling more SOB today will restart lasix 40mg iv today and can change to PO tmr   2.	Monitor BMP.  1.	f/u cardio

## 2018-02-01 NOTE — PROGRESS NOTE ADULT - SUBJECTIVE AND OBJECTIVE BOX
Chief complaint  Patient is a 72y old  Female who presents with a chief complaint of SOB (25 Jan 2018 15:14)   Review of systems  Patient in bed, looks comfortable, no fever, no hypoglycemia.    Labs and Fingersticks  CAPILLARY BLOOD GLUCOSE      POCT Blood Glucose.: 148 mg/dL (01 Feb 2018 13:03)  POCT Blood Glucose.: 186 mg/dL (01 Feb 2018 08:52)  POCT Blood Glucose.: 205 mg/dL (31 Jan 2018 22:09)  POCT Blood Glucose.: 207 mg/dL (31 Jan 2018 17:13)          Calcium, Total Serum: 8.7 (02-01 @ 06:05)  Calcium, Total Serum: 8.9 (01-31 @ 05:00)          02-01    140  |  104  |  29<H>  ----------------------------<  145<H>  4.5   |  21<L>  |  1.61<H>    Ca    8.7      01 Feb 2018 06:05  Mg     2.0     02-01                          10.4   8.03  )-----------( 295      ( 01 Feb 2018 06:05 )             32.2     Medications  MEDICATIONS  (STANDING):  ascorbic acid 500 milliGRAM(s) Oral daily  aspirin enteric coated 81 milliGRAM(s) Oral daily  atorvastatin 40 milliGRAM(s) Oral at bedtime  dextrose 5%. 1000 milliLiter(s) (50 mL/Hr) IV Continuous <Continuous>  dextrose 50% Injectable 12.5 Gram(s) IV Push once  dextrose 50% Injectable 25 Gram(s) IV Push once  dextrose 50% Injectable 25 Gram(s) IV Push once  famotidine    Tablet 20 milliGRAM(s) Oral daily  furosemide   Injectable 40 milliGRAM(s) IV Push daily  heparin  Injectable 5000 Unit(s) SubCutaneous every 12 hours  insulin glargine Injectable (LANTUS) 20 Unit(s) SubCutaneous every morning  insulin glargine Injectable (LANTUS) 68 Unit(s) SubCutaneous at bedtime  insulin lispro (HumaLOG) corrective regimen sliding scale   SubCutaneous three times a day before meals  insulin lispro (HumaLOG) corrective regimen sliding scale   SubCutaneous at bedtime  insulin lispro Injectable (HumaLOG) 24 Unit(s) SubCutaneous three times a day before meals  levothyroxine 50 MICROGram(s) Oral daily  metoprolol succinate ER 12.5 milliGRAM(s) Oral daily  montelukast 10 milliGRAM(s) Oral at bedtime  oseltamivir 30 milliGRAM(s) Oral two times a day  sodium bicarbonate 650 milliGRAM(s) Oral three times a day  sodium chloride 0.9% lock flush 3 milliLiter(s) IV Push every 8 hours  ticagrelor 90 milliGRAM(s) Oral two times a day      Physical Exam  General: Patient comfortable in bed  Vital Signs Last 12 Hrs  T(F): 97.5 (02-01-18 @ 13:11), Max: 98.1 (02-01-18 @ 06:07)  HR: 89 (02-01-18 @ 13:11) (87 - 92)  BP: 107/54 (02-01-18 @ 13:11) (107/54 - 112/55)  BP(mean): --  RR: 18 (02-01-18 @ 13:11) (18 - 18)  SpO2: 100% (02-01-18 @ 13:11) (98% - 100%)  Neck: No palpable thyroid nodules.  CVS: S1S2, No murmurs  Respiratory: No wheezing, no crepitations  GI: Abdomen soft, bowel sounds positive  Musculoskeletal:  edema lower extremities.   Skin: No skin rashes, no ecchymosis    Diagnostics

## 2018-02-01 NOTE — PROGRESS NOTE ADULT - SUBJECTIVE AND OBJECTIVE BOX
Follow Up:      Interval History:    ROS:    Unobtainable because:  All other systems negative    Constitutional: no fever, no chills  HEENT:  no vision changes, no sore throat, no rhinorrhea  Cardiovascular:  no chest pain, no palpitation  Respiratory:  no SOB, no cough  GI:  no abd pain, no vomiting, no diarrhea  urinary: no dysuria, no hematuria, no flank pain  musculoskeletal:  no joint pain, no joint swelling  skin:  no rash  neurology:  no headache, no seizure, no change in mental status        Allergies  No Known Allergies        ANTIMICROBIALS:  oseltamivir 30 two times a day      OTHER MEDS:  acetaminophen   Tablet 650 milliGRAM(s) Oral every 6 hours PRN  acetaminophen   Tablet. 650 milliGRAM(s) Oral every 6 hours PRN  ascorbic acid 500 milliGRAM(s) Oral daily  aspirin enteric coated 81 milliGRAM(s) Oral daily  atorvastatin 40 milliGRAM(s) Oral at bedtime  benzocaine 15 mG/menthol 3.6 mG Lozenge 1 Lozenge Oral every 6 hours PRN  dextrose 5%. 1000 milliLiter(s) IV Continuous <Continuous>  dextrose 50% Injectable 12.5 Gram(s) IV Push once  dextrose 50% Injectable 25 Gram(s) IV Push once  dextrose 50% Injectable 25 Gram(s) IV Push once  dextrose Gel 1 Dose(s) Oral once PRN  famotidine    Tablet 20 milliGRAM(s) Oral daily  furosemide   Injectable 40 milliGRAM(s) IV Push daily  glucagon  Injectable 1 milliGRAM(s) IntraMuscular once PRN  guaiFENesin    Syrup 100 milliGRAM(s) Oral every 6 hours PRN  heparin  Injectable 5000 Unit(s) SubCutaneous every 12 hours  insulin glargine Injectable (LANTUS) 20 Unit(s) SubCutaneous every morning  insulin glargine Injectable (LANTUS) 68 Unit(s) SubCutaneous at bedtime  insulin lispro (HumaLOG) corrective regimen sliding scale   SubCutaneous three times a day before meals  insulin lispro (HumaLOG) corrective regimen sliding scale   SubCutaneous at bedtime  insulin lispro Injectable (HumaLOG) 24 Unit(s) SubCutaneous three times a day before meals  levothyroxine 50 MICROGram(s) Oral daily  metoprolol succinate ER 12.5 milliGRAM(s) Oral daily  montelukast 10 milliGRAM(s) Oral at bedtime  sodium bicarbonate 650 milliGRAM(s) Oral three times a day  sodium chloride 0.65% Nasal 1 Spray(s) Both Nostrils three times a day PRN  sodium chloride 0.9% lock flush 3 milliLiter(s) IV Push every 8 hours  ticagrelor 90 milliGRAM(s) Oral two times a day      Vital Signs Last 24 Hrs  T(C): 36.4 (2018 13:11), Max: 36.9 (2018 21:22)  T(F): 97.5 (2018 13:11), Max: 98.4 (2018 21:22)  HR: 89 (2018 13:11) (87 - 97)  BP: 107/54 (2018 13:11) (107/54 - 127/58)  BP(mean): --  RR: 18 (2018 13:11) (18 - 18)  SpO2: 100% (2018 13:) (98% - 100%)    Physical Exam:  General:    NAD,  non toxic, A&O x 3  HEENT:    no oropharyngeal lesions,   no LAD,   neck supple  Cardio:     regular S1, S2,  no murmur  Respiratory:    clear b/l,    no wheezing  abd:     soft,   BS +,   no tenderness,    no organomegaly  :   no CVAT,  no suprapubic tenderness,   no  cuellar  Musculoskeletal:   no joint swelling,   no edema  vascular: no lines, normal pulses  Skin:    no rash  Neurologic:     no focal deficit  psych: normal affect, no suicidal ideation                          10.4   8.03  )-----------( 295      ( 2018 06:05 )             32.2       02-01    140  |  104  |  29<H>  ----------------------------<  145<H>  4.5   |  21<L>  |  1.61<H>    Ca    8.7      2018 06:05  Mg     2.0     02-01        Urinalysis Basic - ( 2018 15:20 )    Color: PLYEL / Appearance: CLEAR / S.018 / pH: 6.5  Gluc: >1000 / Ketone: NEGATIVE  / Bili: NEGATIVE / Urobili: NORMAL mg/dL   Blood: TRACE / Protein: 150 mg/dL / Nitrite: NEGATIVE   Leuk Esterase: NEGATIVE / RBC: 0-2 / WBC 0-2   Sq Epi: OCC / Non Sq Epi: x / Bacteria: x        MICROBIOLOGY:  v  BLOOD VENOUS  18 --  --  --                Culture - Blood (collected 2018 15:33)  Source: BLOOD PERIPHERAL  Preliminary Report (2018 15:32):    NO ORGANISMS ISOLATED    NO ORGANISMS ISOLATED AT 24 HOURS    Culture - Blood (collected 2018 15:33)  Source: BLOOD VENOUS  Preliminary Report (2018 15:32):    NO ORGANISMS ISOLATED    NO ORGANISMS ISOLATED AT 24 HOURS      RADIOLOGY:  < from: CT Chest No Cont (18 @ 17:59) >    IMPRESSION:  Resolution of the previously noted bilateral pleural effusions and   groundglass opacities within both lungs when compared to previous exam.

## 2018-02-01 NOTE — PROGRESS NOTE ADULT - ASSESSMENT
71 Female, with a PmHx of HTN, CKD, Hyperlipidemia, DM-II, CAD s/p CABG x 3, s/p stents, presenting to the [facility] ED c/o shortness of breath. Pt is being admitted to telemetry for acute on chronic systolic heart failure.  Now she is with new fever, as well as URI

## 2018-02-01 NOTE — PROGRESS NOTE ADULT - SUBJECTIVE AND OBJECTIVE BOX
Dr. Muhammad (Nephrology)  Office [phone]  Cell [phone]  [facility]  Cell [phone]      Patient is a 72y old  Female who presents with a chief complaint of SOB (25 Jan 2018 15:14)      Patient seen and examined at bedside. not feeling well, feeling more SOB today    VITALS:  T(F): 97.5 (02-01-18 @ 13:11), Max: 98.4 (01-31-18 @ 21:22)  HR: 89 (02-01-18 @ 13:11)  BP: 107/54 (02-01-18 @ 13:11)  RR: 18 (02-01-18 @ 13:11)  SpO2: 100% (02-01-18 @ 13:11)  Wt(kg): --    01-31 @ 07:01  -  02-01 @ 07:00  --------------------------------------------------------  IN: 475 mL / OUT: 0 mL / NET: 475 mL    02-01 @ 07:01  -  02-01 @ 16:18  --------------------------------------------------------  IN: 1020 mL / OUT: 0 mL / NET: 1020 mL          PHYSICAL EXAM:  Constitutional: NAD  Neck: No JVD  Respiratory: left base crackles  Cardiovascular: S1, S2, RRR  Gastrointestinal: BS+, soft, NT/ND  Extremities: No peripheral edema    Hospital Medications:   MEDICATIONS  (STANDING):  ascorbic acid 500 milliGRAM(s) Oral daily  aspirin enteric coated 81 milliGRAM(s) Oral daily  atorvastatin 40 milliGRAM(s) Oral at bedtime  dextrose 5%. 1000 milliLiter(s) (50 mL/Hr) IV Continuous <Continuous>  dextrose 50% Injectable 12.5 Gram(s) IV Push once  dextrose 50% Injectable 25 Gram(s) IV Push once  dextrose 50% Injectable 25 Gram(s) IV Push once  famotidine    Tablet 20 milliGRAM(s) Oral daily  furosemide   Injectable 40 milliGRAM(s) IV Push daily  heparin  Injectable 5000 Unit(s) SubCutaneous every 12 hours  insulin glargine Injectable (LANTUS) 20 Unit(s) SubCutaneous every morning  insulin glargine Injectable (LANTUS) 68 Unit(s) SubCutaneous at bedtime  insulin lispro (HumaLOG) corrective regimen sliding scale   SubCutaneous three times a day before meals  insulin lispro (HumaLOG) corrective regimen sliding scale   SubCutaneous at bedtime  insulin lispro Injectable (HumaLOG) 24 Unit(s) SubCutaneous three times a day before meals  levothyroxine 50 MICROGram(s) Oral daily  metoprolol succinate ER 12.5 milliGRAM(s) Oral daily  montelukast 10 milliGRAM(s) Oral at bedtime  oseltamivir 30 milliGRAM(s) Oral two times a day  sodium bicarbonate 650 milliGRAM(s) Oral three times a day  sodium chloride 0.9% lock flush 3 milliLiter(s) IV Push every 8 hours  ticagrelor 90 milliGRAM(s) Oral two times a day      LABS:  02-01    140  |  104  |  29<H>  ----------------------------<  145<H>  4.5   |  21<L>  |  1.61<H>    Ca    8.7      01 Feb 2018 06:05  Mg     2.0     02-01      Creatinine Trend: 1.61 <--, 1.56 <--, 1.53 <--, 1.49 <--, 1.58 <--, 1.60 <--, 1.77 <--                                10.4   8.03  )-----------( 295      ( 01 Feb 2018 06:05 )             32.2     Urine Studies:  Urinalysis - [01-30-18 @ 15:20]      Color PLYEL / Appearance CLEAR / SG 1.018 / pH 6.5      Gluc >1000 / Ketone NEGATIVE  / Bili NEGATIVE / Urobili NORMAL       Blood TRACE / Protein 150 / Leuk Est NEGATIVE / Nitrite NEGATIVE      RBC 0-2 / WBC 0-2 / Hyaline  / Gran  / Sq Epi OCC / Non Sq Epi  / Bacteria       Iron 40, TIBC 377, %sat --      [01-20-18 @ 06:06]  Ferritin 38.35      [01-20-18 @ 06:06]  HbA1c 9.1      [11-25-17 @ 06:30]  TSH 0.55      [01-19-18 @ 06:45]  Lipid: chol 130, , HDL 30, LDL 70      [01-19-18 @ 06:45]        RADIOLOGY & ADDITIONAL STUDIES:

## 2018-02-02 LAB
BASOPHILS # BLD AUTO: 0.02 K/UL — SIGNIFICANT CHANGE UP (ref 0–0.2)
BASOPHILS NFR BLD AUTO: 0.4 % — SIGNIFICANT CHANGE UP (ref 0–2)
BUN SERPL-MCNC: 35 MG/DL — HIGH (ref 7–23)
CALCIUM SERPL-MCNC: 8.5 MG/DL — SIGNIFICANT CHANGE UP (ref 8.4–10.5)
CHLORIDE SERPL-SCNC: 100 MMOL/L — SIGNIFICANT CHANGE UP (ref 98–107)
CO2 SERPL-SCNC: 23 MMOL/L — SIGNIFICANT CHANGE UP (ref 22–31)
CREAT SERPL-MCNC: 1.7 MG/DL — HIGH (ref 0.5–1.3)
EOSINOPHIL # BLD AUTO: 0.55 K/UL — HIGH (ref 0–0.5)
EOSINOPHIL NFR BLD AUTO: 9.9 % — HIGH (ref 0–6)
GLUCOSE BLDC GLUCOMTR-MCNC: 182 MG/DL — HIGH (ref 70–99)
GLUCOSE BLDC GLUCOMTR-MCNC: 213 MG/DL — HIGH (ref 70–99)
GLUCOSE BLDC GLUCOMTR-MCNC: 238 MG/DL — HIGH (ref 70–99)
GLUCOSE BLDC GLUCOMTR-MCNC: 287 MG/DL — HIGH (ref 70–99)
GLUCOSE SERPL-MCNC: 315 MG/DL — HIGH (ref 70–99)
HCT VFR BLD CALC: 31.2 % — LOW (ref 34.5–45)
HGB BLD-MCNC: 9.8 G/DL — LOW (ref 11.5–15.5)
IMM GRANULOCYTES # BLD AUTO: 0.04 # — SIGNIFICANT CHANGE UP
IMM GRANULOCYTES NFR BLD AUTO: 0.7 % — SIGNIFICANT CHANGE UP (ref 0–1.5)
LYMPHOCYTES # BLD AUTO: 2.13 K/UL — SIGNIFICANT CHANGE UP (ref 1–3.3)
LYMPHOCYTES # BLD AUTO: 38.2 % — SIGNIFICANT CHANGE UP (ref 13–44)
MAGNESIUM SERPL-MCNC: 1.8 MG/DL — SIGNIFICANT CHANGE UP (ref 1.6–2.6)
MCHC RBC-ENTMCNC: 26.6 PG — LOW (ref 27–34)
MCHC RBC-ENTMCNC: 31.4 % — LOW (ref 32–36)
MCV RBC AUTO: 84.6 FL — SIGNIFICANT CHANGE UP (ref 80–100)
MONOCYTES # BLD AUTO: 0.72 K/UL — SIGNIFICANT CHANGE UP (ref 0–0.9)
MONOCYTES NFR BLD AUTO: 12.9 % — SIGNIFICANT CHANGE UP (ref 2–14)
NEUTROPHILS # BLD AUTO: 2.12 K/UL — SIGNIFICANT CHANGE UP (ref 1.8–7.4)
NEUTROPHILS NFR BLD AUTO: 37.9 % — LOW (ref 43–77)
NRBC # FLD: 0 — SIGNIFICANT CHANGE UP
PLATELET # BLD AUTO: 263 K/UL — SIGNIFICANT CHANGE UP (ref 150–400)
PMV BLD: 9.2 FL — SIGNIFICANT CHANGE UP (ref 7–13)
POTASSIUM SERPL-MCNC: 4 MMOL/L — SIGNIFICANT CHANGE UP (ref 3.5–5.3)
POTASSIUM SERPL-SCNC: 4 MMOL/L — SIGNIFICANT CHANGE UP (ref 3.5–5.3)
RBC # BLD: 3.69 M/UL — LOW (ref 3.8–5.2)
RBC # FLD: 15.4 % — HIGH (ref 10.3–14.5)
SODIUM SERPL-SCNC: 138 MMOL/L — SIGNIFICANT CHANGE UP (ref 135–145)
WBC # BLD: 5.58 K/UL — SIGNIFICANT CHANGE UP (ref 3.8–10.5)
WBC # FLD AUTO: 5.58 K/UL — SIGNIFICANT CHANGE UP (ref 3.8–10.5)

## 2018-02-02 PROCEDURE — 99232 SBSQ HOSP IP/OBS MODERATE 35: CPT

## 2018-02-02 RX ADMIN — Medication 81 MILLIGRAM(S): at 12:04

## 2018-02-02 RX ADMIN — Medication 12.5 MILLIGRAM(S): at 05:46

## 2018-02-02 RX ADMIN — HEPARIN SODIUM 5000 UNIT(S): 5000 INJECTION INTRAVENOUS; SUBCUTANEOUS at 17:40

## 2018-02-02 RX ADMIN — Medication 30 MILLIGRAM(S): at 17:41

## 2018-02-02 RX ADMIN — Medication 40 MILLIGRAM(S): at 05:46

## 2018-02-02 RX ADMIN — TICAGRELOR 90 MILLIGRAM(S): 90 TABLET ORAL at 05:46

## 2018-02-02 RX ADMIN — SODIUM CHLORIDE 3 MILLILITER(S): 9 INJECTION INTRAMUSCULAR; INTRAVENOUS; SUBCUTANEOUS at 13:04

## 2018-02-02 RX ADMIN — Medication 24 UNIT(S): at 13:26

## 2018-02-02 RX ADMIN — Medication 24 UNIT(S): at 17:40

## 2018-02-02 RX ADMIN — Medication 4: at 13:25

## 2018-02-02 RX ADMIN — HEPARIN SODIUM 5000 UNIT(S): 5000 INJECTION INTRAVENOUS; SUBCUTANEOUS at 05:46

## 2018-02-02 RX ADMIN — TICAGRELOR 90 MILLIGRAM(S): 90 TABLET ORAL at 17:40

## 2018-02-02 RX ADMIN — SODIUM CHLORIDE 3 MILLILITER(S): 9 INJECTION INTRAMUSCULAR; INTRAVENOUS; SUBCUTANEOUS at 05:47

## 2018-02-02 RX ADMIN — Medication 4: at 17:40

## 2018-02-02 RX ADMIN — SODIUM CHLORIDE 3 MILLILITER(S): 9 INJECTION INTRAMUSCULAR; INTRAVENOUS; SUBCUTANEOUS at 21:25

## 2018-02-02 RX ADMIN — MONTELUKAST 10 MILLIGRAM(S): 4 TABLET, CHEWABLE ORAL at 21:26

## 2018-02-02 RX ADMIN — Medication 650 MILLIGRAM(S): at 05:46

## 2018-02-02 RX ADMIN — Medication 650 MILLIGRAM(S): at 21:26

## 2018-02-02 RX ADMIN — Medication 650 MILLIGRAM(S): at 12:04

## 2018-02-02 RX ADMIN — INSULIN GLARGINE 68 UNIT(S): 100 INJECTION, SOLUTION SUBCUTANEOUS at 22:24

## 2018-02-02 RX ADMIN — INSULIN GLARGINE 20 UNIT(S): 100 INJECTION, SOLUTION SUBCUTANEOUS at 09:14

## 2018-02-02 RX ADMIN — Medication 50 MICROGRAM(S): at 05:46

## 2018-02-02 RX ADMIN — ATORVASTATIN CALCIUM 40 MILLIGRAM(S): 80 TABLET, FILM COATED ORAL at 21:26

## 2018-02-02 RX ADMIN — Medication 24 UNIT(S): at 09:13

## 2018-02-02 RX ADMIN — FAMOTIDINE 20 MILLIGRAM(S): 10 INJECTION INTRAVENOUS at 12:04

## 2018-02-02 RX ADMIN — Medication 6: at 09:13

## 2018-02-02 RX ADMIN — Medication 30 MILLIGRAM(S): at 05:46

## 2018-02-02 RX ADMIN — Medication 500 MILLIGRAM(S): at 12:04

## 2018-02-02 RX ADMIN — Medication 1 SPRAY(S): at 05:47

## 2018-02-02 NOTE — PROGRESS NOTE ADULT - SUBJECTIVE AND OBJECTIVE BOX
Subjective:  no CP or SOB, c/o nasal congestion, now with +FLU      MEDICATIONS  (STANDING):  aspirin enteric coated 81 milliGRAM(s) Oral daily  atorvastatin 40 milliGRAM(s) Oral at bedtime  dextrose 5%. 1000 milliLiter(s) (50 mL/Hr) IV Continuous <Continuous>  dextrose 50% Injectable 12.5 Gram(s) IV Push once  dextrose 50% Injectable 25 Gram(s) IV Push once  dextrose 50% Injectable 25 Gram(s) IV Push once  famotidine    Tablet 20 milliGRAM(s) Oral daily  furosemide    Tablet 40 milliGRAM(s) Oral daily  heparin  Injectable 5000 Unit(s) SubCutaneous every 12 hours  insulin glargine Injectable (LANTUS) 20 Unit(s) SubCutaneous every morning  insulin glargine Injectable (LANTUS) 68 Unit(s) SubCutaneous at bedtime  insulin lispro (HumaLOG) corrective regimen sliding scale   SubCutaneous three times a day before meals  insulin lispro (HumaLOG) corrective regimen sliding scale   SubCutaneous at bedtime  insulin lispro Injectable (HumaLOG) 24 Unit(s) SubCutaneous three times a day before meals  levothyroxine 50 MICROGram(s) Oral daily  metoprolol succinate ER 12.5 milliGRAM(s) Oral daily  montelukast 10 milliGRAM(s) Oral at bedtime  oseltamivir 30 milliGRAM(s) Oral two times a day  sodium bicarbonate 650 milliGRAM(s) Oral three times a day  sodium chloride 0.9% lock flush 3 milliLiter(s) IV Push every 8 hours  ticagrelor 90 milliGRAM(s) Oral two times a day    MEDICATIONS  (PRN):  acetaminophen   Tablet 650 milliGRAM(s) Oral every 6 hours PRN For Temp greater than 38 C (100.4 F)  acetaminophen   Tablet. 650 milliGRAM(s) Oral every 6 hours PRN Mild, moderate and severe pain  benzocaine 15 mG/menthol 3.6 mG Lozenge 1 Lozenge Oral every 6 hours PRN Sore Throat  dextrose Gel 1 Dose(s) Oral once PRN Blood Glucose LESS THAN 70 milliGRAM(s)/deciliter  glucagon  Injectable 1 milliGRAM(s) IntraMuscular once PRN Glucose LESS THAN 70 milligrams/deciliter  guaiFENesin    Syrup 100 milliGRAM(s) Oral every 6 hours PRN Cough  sodium chloride 0.65% Nasal 1 Spray(s) Both Nostrils three times a day PRN Nasal Congestion      LABS:                        9.8    5.58  )-----------( 263      ( 02 Feb 2018 04:00 )             31.2     Hemoglobin: 9.8 g/dL (02-02 @ 04:00)  Hemoglobin: 10.4 g/dL (02-01 @ 06:05)  Hemoglobin: 10.1 g/dL (01-31 @ 05:00)  Hemoglobin: 9.8 g/dL (01-30 @ 09:19)  Hemoglobin: 10.6 g/dL (01-29 @ 07:51)    02-02    138  |  100  |  35<H>  ----------------------------<  315<H>  4.0   |  23  |  1.70<H>    Ca    8.5      02 Feb 2018 04:00  Mg     1.8     02-02      Creatinine Trend: 1.70<--, 1.61<--, 1.56<--, 1.53<--, 1.49<--, 1.58<--           PHYSICAL EXAM  Vital Signs Last 24 Hrs  T(C): 36.7 (02 Feb 2018 14:56), Max: 37.1 (02 Feb 2018 05:47)  T(F): 98 (02 Feb 2018 14:56), Max: 98.7 (02 Feb 2018 05:47)  HR: 86 (02 Feb 2018 14:56) (80 - 86)  BP: 106/61 (02 Feb 2018 14:56) (106/61 - 128/67)  BP(mean): --  RR: 17 (02 Feb 2018 14:56) (16 - 17)  SpO2: 100% (02 Feb 2018 14:56) (98% - 100%)      Cardiovascular: S1S2 RRR  Respiratory: CTA BL with decreased breath sounds B/L LLs  Gastrointestinal:  Soft, Non-tender, + BS	  Extremities No edema, cyanosis or clubbing  B/L LE's     DIAGNOSTIC DATA:     TELEMETRY: SR 	       < from: Cardiac Cath Lab - Adult (10.13.17 @ 10:40) >  VENTRICLES: No left ventriculogram was performed.  CORONARY VESSELS: The coronary circulation is right dominant.  LM:   --  LM: Normal.  LAD:   --  Proximal LAD: There was a diffuse 60 % stenosis.  --  Mid LAD: There was a 100 % stenosis with the distal vessel being filled  via LIMA-LAD.  CX:   --  Circumflex: The vessel was small sized. Angiography showed mild  atherosclerosis withno flow limiting lesions.  --  OM1: Angiography showed mild atherosclerosis with no flow limiting  lesions.  RI:   --  Ostial ramus intermedius: There was a tubular 80 % stenosis.  There was PARUL grade 3 flow through the vessel (brisk flow).  RCA:   --  Proximal RCA: Angiography showed mild atherosclerosis with no  flow limiting lesions.  --  Mid RCA: There was a 100 % stenosis with the distal vessel being filled  via collaterals. This lesion is a chronic total occlusion.  GRAFTS:   --  Graft to the LAD: The graft was a LIMA. Graft angiography  showed minor luminal irregularities. Distal vessel angiography showed  minor luminal irregularities.  AORTA: Ascending aorta: Normal. No additional grafts visualized in  aortogram.  COMPLICATIONS: There were no complications.  DIAGNOSTIC RECOMMENDATIONS: Coronary angiogram demonstrates patent  LIMA-LAD, closed SVG grafts, and a severe stenosis in the ostial Ramus.  Will perform staged PCI to RI when Cr stable and renally optimized.  Prepared and signed by  Corie Ga M.D.  Signed 10/17/2017 13:49:15    < end of copied text >    < from: Cardiac Cath Lab - Adult (11.17.17 @ 11:48) >  PROCEDURE:  --  Intervention on ramus intermedius: drug-eluting stent.    VENTRICLES: No LV gram was performed; however, a recent echocardiogram  demonstrated normal global and regional LV function.  CORONARY VESSELS: The coronary circulation is right dominant.  LM:   --  LM: Angiography showed minor luminal irregularities with no flow  limiting lesions.  LAD:   --  Ostial LAD: There was a 100 % stenosis.  CX:   --  Circumflex: This vessel was not injected, but was visualized  during a prior cardiac catheterization.  RI:   --  Ramus intermedius: There was a 95 % stenosis.  RCA:   --  RCA: This vessel was not injected.  COMPLICATIONS: There were no complications.  DIAGNOSTIC RECOMMENDATIONS: The patient should continue with the present  medications. will add plavix 75mg po once a day/ will add ECASA 325mg po  once day.  Prepared and signed by  Roberta Quick M.D.  Signed 11/17/2017 13:16:01    < end of copied text >    < from: TTE with Doppler (w/Cont) (11.25.17 @ 12:33) >  CONCLUSIONS:  1. Mitral annular calcification, otherwise normal mitral  valve. Mild mitral regurgitation.  2. Normal left ventricular internal dimensions and wall  thicknesses.  3. Endocardium not well visualized; grossly moderate to  severe segmental left ventricularsystolic dysfunction.  Hypokinesis of the inferior, lateral, and inferolateral  walls.  Endocardial visualization enhanced with intravenous  injection of echo contrast (Definity).  No LV thrombus  seen.  4. The right ventricle is not well visualized;grossly  normal right ventricular systolic function.  ------------------------------------------------------------------------  Confirmed on  11/25/2017 - 14:44:41 by Jose Lucia M.D.    < end of copied text >    < from: Cardiac Cath Lab - Adult (12.05.17 @ 12:48) >  VENTRICLES: No LV gram was performed; however, a recent echocardiogram  demonstrated an EF of 36 %.  CORONARY VESSELS: The coronary circulation is right dominant.  LM:   --  Distal left main: Angiography showed minor luminal irregularities  with no flow limiting lesions.  LAD:   --  Mid LAD: There was a 100 % stenosis with the distal vessel being  filled via LIMA-LAD.  CX:   --  Proximal circumflex: There was a tubular 20 % stenosis.  --  Mid circumflex: Angiography showed minor luminal irregularities with no  flow limiting lesions.  --  Distal circumflex: Angiography showed minor luminal irregularities with  no flow limiting lesions.  --  OM1: The vessel was small sized. There was a discrete 60 % stenosis.  RI:   --  Ostial ramus intermedius: Angiography showed minor luminal  irregularities with no flow limiting lesions. There was no significant  restenosis in RI stent.  --  Proximal ramus intermedius: Angiography showed minor luminal  irregularities with no flow limiting lesions.  --  Mid ramus intermedius: There was a tubular 80 % stenosis. The lesion  was associated with a small filling defect consistent with thrombus.  RCA:   --  Mid RCA: There was a 100 % stenosis with the distal vessel being  filled via collaterals from the LAD.  GRAFTS:   --  Graft to the LAD: The graft was a LIMA. Graft angiography  showed minor luminal irregularities. Distal vessel angiography showed  minor luminalirregularities.  COMPLICATIONS: There were no complications.  DIAGNOSTIC RECOMMENDATIONS: Coronary angiogram demonstrates a severe  stenosis in the ramus intermedius that is the cause of the patient's  clinical presentation. Will therefore perform PCI to the RI.  INTERVENTIONAL RECOMMENDATIONS: S/p successful CORNELIA to the Ramus  Intermedius. The patient should continue with ASA and Brilinta for at  least 12 months.  Prepared and signed by  Corie Ga M.D.  Signed 12/06/2017 17:06:30    < from: CT Chest No Cont (01.20.18 @ 09:55) >  IMPRESSION:   Mild pulmonary edema with small simple bilateral pleural effusions, right   greater than left, with no evidence of loculation.    < end of copied text >    < from: TTE with Doppler (w/Cont) (01.23.18 @ 12:31) >  CONCLUSIONS:  1. Mitral annular calcification, otherwise normal mitral  valve. Mild-moderate mitral regurgitation.  2. Calcified trileaflet aortic valve with normal opening.  Mild aortic regurgitation.  3. Normal left ventricular internal dimensions and wall  thicknesses.  4. Endocardium not well visualized; grossly severe global  left ventricular systolic dysfunction.  Endocardial  visualization enhanced with intravenous injection of echo  contrast (Definity).  5. The right ventricle is not well visualized; grossly  normal right ventricular systolic function.  *** Compared with echocardiogram of 11/25/2017, no  significant changes noted.  ------------------------------------------------------------------------  Confirmed on  1/23/2018 - 14:35:49 by Jose Lucia M.D.    < end of copied text >    < from: Nuclear Stress Test-Pharmacologic (01.28.18 @ 12:31) >  IMPRESSIONS:Abnormal Study  * Myocardial Perfusion SPECT results are abnormal.  * There is a medium sized, mild to moderate defect in  anterior wall that is reversible, suggestive of  ischemia.There are large, moderate to severe defects in  inferolateral, lateral walls that are fixed, suggestive of  infarct.  * Post-stress gated wall motion analysis was performed  (LVEF = 33 %;LVEDV = 116 ml.), revealing severe overall  hypokinesis. There was focal inferiolateral akinesis and  severe lateral hypokinesis with absence of systolic  thickening. The best motion was in the septum.  *** Compared with Nuclear/Stress test of 11/5/2014, the  observed defect are now more extensive, suggesting  progression of disease. Prior LVEF was 39% with EDV 77 mL.  ------------------------------------------------------------------------  Revised on  1/29/2018 - 16:44:19 by Lorenzo Covarrubias M.D.  Confirmed on  1/30/2018 - 15:45:44 by José Hansen M.D.  < end of copied text >    < from: CT Chest No Cont (01.31.18 @ 17:59) >  IMPRESSION:  Resolution of the previously noted bilateral pleural effusions and   groundglass opacities within both lungs when compared to previous exam.    < end of copied text >    ASSESSMENT/PLAN: 	72y Female w/ PMH HTN, CKD, Hyperlipidemia, DM-II, CAD s/p 3V CABG  (LIMA to LAD, SVG to OM, SVG to PDA) at [facility] 2014, s/p CORNELIA TO RI 11/17/17, NSTEMI 12/2017 s/p C on 12/5 and CORNELIA to [facility], TTE at that time revealed grossly moderate to severe LV dysfunction with hypokinesis of the inferior, lateral and inferolateral with an EF of 36% admitted with acute on chronic systolic CHF, s/p IV diureses, s/p NST as above, now s/p fever 1/30/18--RVP negative     --Cont PO Lasix, patient is Euvolemic  --mildly elevated trops noted in setting of CKD & CHF exacerbation  - no need for urgent repeat cath at this time  --Repeat TTE unchanged from prior. EF 32%  --Cont. DAPT for recent CORNELIA  --+FLU , now on tamiflu till 2/5   --NST noted above plan for CATH when flu resolves family and patient aware/ agree with plan

## 2018-02-02 NOTE — PROGRESS NOTE ADULT - ASSESSMENT
CHF exacerbation.  Plan: telemonitor   strict I & O's, daily wts  Fluid restriction  diuresis as per cards  further edwards as per cards  cath pending     Diabetes mellitus, type 2.  Plan: monitor fs   basal/bolus   endocrine following     Hyperlipidemia.  Plan: cw statin     Hypothyroidism.  Plan: cw levothyroxine.     Influenza Plan cw tamiflu

## 2018-02-02 NOTE — PROGRESS NOTE ADULT - ASSESSMENT
1.	CHF decompensation, EF 32%  2.	MERVIN: renal function worsening likely hyperglycemia on lasix, FEurea<35%, now improved to baseline  3.	CKD 3, baseline 1.5-1.8  4.	CAD. cardio following  5.	Acidosis improved   6.	Hypokalemia    7.	fever +flu on Tamiflu     PLAN:   1.	Continue diuretic   2.	Monitor BMP.  1.	f/u cardio

## 2018-02-02 NOTE — PROGRESS NOTE ADULT - SUBJECTIVE AND OBJECTIVE BOX
Chief complaint  Patient is a 72y old  Female who presents with a chief complaint of SOB (25 Jan 2018 15:14)   Review of systems  Patient in bed, looks comfortable, no fever, no hypoglycemia.    Labs and Fingersticks  CAPILLARY BLOOD GLUCOSE      POCT Blood Glucose.: 213 mg/dL (02 Feb 2018 13:18)  POCT Blood Glucose.: 287 mg/dL (02 Feb 2018 08:56)  POCT Blood Glucose.: 230 mg/dL (01 Feb 2018 21:37)  POCT Blood Glucose.: 145 mg/dL (01 Feb 2018 17:03)          Calcium, Total Serum: 8.5 (02-02 @ 04:00)  Calcium, Total Serum: 8.7 (02-01 @ 06:05)          02-02    138  |  100  |  35<H>  ----------------------------<  315<H>  4.0   |  23  |  1.70<H>    Ca    8.5      02 Feb 2018 04:00  Mg     1.8     02-02                          9.8    5.58  )-----------( 263      ( 02 Feb 2018 04:00 )             31.2     Medications  MEDICATIONS  (STANDING):  aspirin enteric coated 81 milliGRAM(s) Oral daily  atorvastatin 40 milliGRAM(s) Oral at bedtime  dextrose 5%. 1000 milliLiter(s) (50 mL/Hr) IV Continuous <Continuous>  dextrose 50% Injectable 12.5 Gram(s) IV Push once  dextrose 50% Injectable 25 Gram(s) IV Push once  dextrose 50% Injectable 25 Gram(s) IV Push once  famotidine    Tablet 20 milliGRAM(s) Oral daily  furosemide    Tablet 40 milliGRAM(s) Oral daily  heparin  Injectable 5000 Unit(s) SubCutaneous every 12 hours  insulin glargine Injectable (LANTUS) 20 Unit(s) SubCutaneous every morning  insulin glargine Injectable (LANTUS) 68 Unit(s) SubCutaneous at bedtime  insulin lispro (HumaLOG) corrective regimen sliding scale   SubCutaneous three times a day before meals  insulin lispro (HumaLOG) corrective regimen sliding scale   SubCutaneous at bedtime  insulin lispro Injectable (HumaLOG) 24 Unit(s) SubCutaneous three times a day before meals  levothyroxine 50 MICROGram(s) Oral daily  metoprolol succinate ER 12.5 milliGRAM(s) Oral daily  montelukast 10 milliGRAM(s) Oral at bedtime  oseltamivir 30 milliGRAM(s) Oral two times a day  sodium bicarbonate 650 milliGRAM(s) Oral three times a day  sodium chloride 0.9% lock flush 3 milliLiter(s) IV Push every 8 hours  ticagrelor 90 milliGRAM(s) Oral two times a day      Physical Exam  General: Patient comfortable in bed  Vital Signs Last 12 Hrs  T(F): 98 (02-02-18 @ 14:56), Max: 98.7 (02-02-18 @ 05:47)  HR: 86 (02-02-18 @ 14:56) (82 - 86)  BP: 106/61 (02-02-18 @ 14:56) (106/61 - 128/67)  BP(mean): --  RR: 17 (02-02-18 @ 14:56) (16 - 17)  SpO2: 100% (02-02-18 @ 14:56) (99% - 100%)  Neck: No palpable thyroid nodules.  CVS: S1S2, No murmurs  Respiratory: No wheezing, no crepitations  GI: Abdomen soft, bowel sounds positive  Musculoskeletal:  edema lower extremities.   Skin: No skin rashes, no ecchymosis    Diagnostics

## 2018-02-02 NOTE — PROGRESS NOTE ADULT - SUBJECTIVE AND OBJECTIVE BOX
Dr. Muhammad (Nephrology)  Office [phone]  Cell [phone]  [facility]  Cell [phone]      Patient is a 72y old  Female who presents with a chief complaint of SOB (25 Jan 2018 15:14)      Patient seen and examined at bedside. +SOB    VITALS:  T(F): 98.7 (02-02-18 @ 05:47), Max: 98.7 (02-02-18 @ 05:47)  HR: 82 (02-02-18 @ 05:47)  BP: 128/67 (02-02-18 @ 05:47)  RR: 16 (02-02-18 @ 05:47)  SpO2: 99% (02-02-18 @ 05:47)  Wt(kg): --    02-01 @ 07:01  -  02-02 @ 07:00  --------------------------------------------------------  IN: 1600 mL / OUT: 0 mL / NET: 1600 mL          PHYSICAL EXAM:  Constitutional: NAD  Neck: No JVD  Respiratory: CTAB, no wheezes, rales or rhonchi  Cardiovascular: S1, S2, RRR  Gastrointestinal: BS+, soft, NT/ND  Extremities: No peripheral edema    Hospital Medications:   MEDICATIONS  (STANDING):  ascorbic acid 500 milliGRAM(s) Oral daily  aspirin enteric coated 81 milliGRAM(s) Oral daily  atorvastatin 40 milliGRAM(s) Oral at bedtime  dextrose 5%. 1000 milliLiter(s) (50 mL/Hr) IV Continuous <Continuous>  dextrose 50% Injectable 12.5 Gram(s) IV Push once  dextrose 50% Injectable 25 Gram(s) IV Push once  dextrose 50% Injectable 25 Gram(s) IV Push once  famotidine    Tablet 20 milliGRAM(s) Oral daily  furosemide    Tablet 40 milliGRAM(s) Oral daily  heparin  Injectable 5000 Unit(s) SubCutaneous every 12 hours  insulin glargine Injectable (LANTUS) 20 Unit(s) SubCutaneous every morning  insulin glargine Injectable (LANTUS) 68 Unit(s) SubCutaneous at bedtime  insulin lispro (HumaLOG) corrective regimen sliding scale   SubCutaneous three times a day before meals  insulin lispro (HumaLOG) corrective regimen sliding scale   SubCutaneous at bedtime  insulin lispro Injectable (HumaLOG) 24 Unit(s) SubCutaneous three times a day before meals  levothyroxine 50 MICROGram(s) Oral daily  metoprolol succinate ER 12.5 milliGRAM(s) Oral daily  montelukast 10 milliGRAM(s) Oral at bedtime  oseltamivir 30 milliGRAM(s) Oral two times a day  sodium bicarbonate 650 milliGRAM(s) Oral three times a day  sodium chloride 0.9% lock flush 3 milliLiter(s) IV Push every 8 hours  ticagrelor 90 milliGRAM(s) Oral two times a day      LABS:  02-02    138  |  100  |  35<H>  ----------------------------<  315<H>  4.0   |  23  |  1.70<H>    Ca    8.5      02 Feb 2018 04:00  Mg     1.8     02-02      Creatinine Trend: 1.70 <--, 1.61 <--, 1.56 <--, 1.53 <--, 1.49 <--, 1.58 <--, 1.60 <--                                9.8    5.58  )-----------( 263      ( 02 Feb 2018 04:00 )             31.2     Urine Studies:  Urinalysis - [01-30-18 @ 15:20]      Color PLYEL / Appearance CLEAR / SG 1.018 / pH 6.5      Gluc >1000 / Ketone NEGATIVE  / Bili NEGATIVE / Urobili NORMAL       Blood TRACE / Protein 150 / Leuk Est NEGATIVE / Nitrite NEGATIVE      RBC 0-2 / WBC 0-2 / Hyaline  / Gran  / Sq Epi OCC / Non Sq Epi  / Bacteria       Iron 40, TIBC 377, %sat --      [01-20-18 @ 06:06]  Ferritin 38.35      [01-20-18 @ 06:06]  HbA1c 9.1      [11-25-17 @ 06:30]  TSH 0.55      [01-19-18 @ 06:45]  Lipid: chol 130, , HDL 30, LDL 70      [01-19-18 @ 06:45]        RADIOLOGY & ADDITIONAL STUDIES:

## 2018-02-02 NOTE — PROGRESS NOTE ADULT - SUBJECTIVE AND OBJECTIVE BOX
Patient is a 72y old  Female who presents with a chief complaint of SOB (25 Jan 2018 15:14)      Any change in ROS: pt is doing ok  no SOB     MEDICATIONS  (STANDING):  aspirin enteric coated 81 milliGRAM(s) Oral daily  atorvastatin 40 milliGRAM(s) Oral at bedtime  dextrose 5%. 1000 milliLiter(s) (50 mL/Hr) IV Continuous <Continuous>  dextrose 50% Injectable 12.5 Gram(s) IV Push once  dextrose 50% Injectable 25 Gram(s) IV Push once  dextrose 50% Injectable 25 Gram(s) IV Push once  famotidine    Tablet 20 milliGRAM(s) Oral daily  furosemide    Tablet 40 milliGRAM(s) Oral daily  heparin  Injectable 5000 Unit(s) SubCutaneous every 12 hours  insulin glargine Injectable (LANTUS) 20 Unit(s) SubCutaneous every morning  insulin glargine Injectable (LANTUS) 68 Unit(s) SubCutaneous at bedtime  insulin lispro (HumaLOG) corrective regimen sliding scale   SubCutaneous three times a day before meals  insulin lispro (HumaLOG) corrective regimen sliding scale   SubCutaneous at bedtime  insulin lispro Injectable (HumaLOG) 24 Unit(s) SubCutaneous three times a day before meals  levothyroxine 50 MICROGram(s) Oral daily  metoprolol succinate ER 12.5 milliGRAM(s) Oral daily  montelukast 10 milliGRAM(s) Oral at bedtime  oseltamivir 30 milliGRAM(s) Oral two times a day  sodium bicarbonate 650 milliGRAM(s) Oral three times a day  sodium chloride 0.9% lock flush 3 milliLiter(s) IV Push every 8 hours  ticagrelor 90 milliGRAM(s) Oral two times a day    MEDICATIONS  (PRN):  acetaminophen   Tablet 650 milliGRAM(s) Oral every 6 hours PRN For Temp greater than 38 C (100.4 F)  acetaminophen   Tablet. 650 milliGRAM(s) Oral every 6 hours PRN Mild, moderate and severe pain  benzocaine 15 mG/menthol 3.6 mG Lozenge 1 Lozenge Oral every 6 hours PRN Sore Throat  dextrose Gel 1 Dose(s) Oral once PRN Blood Glucose LESS THAN 70 milliGRAM(s)/deciliter  glucagon  Injectable 1 milliGRAM(s) IntraMuscular once PRN Glucose LESS THAN 70 milligrams/deciliter  guaiFENesin    Syrup 100 milliGRAM(s) Oral every 6 hours PRN Cough  sodium chloride 0.65% Nasal 1 Spray(s) Both Nostrils three times a day PRN Nasal Congestion    Vital Signs Last 24 Hrs  T(C): 37.1 (02 Feb 2018 05:47), Max: 37.1 (02 Feb 2018 05:47)  T(F): 98.7 (02 Feb 2018 05:47), Max: 98.7 (02 Feb 2018 05:47)  HR: 82 (02 Feb 2018 05:47) (80 - 82)  BP: 128/67 (02 Feb 2018 05:47) (110/56 - 128/67)  BP(mean): --  RR: 16 (02 Feb 2018 05:47) (16 - 17)  SpO2: 99% (02 Feb 2018 05:47) (98% - 99%)    I&O's Summary    01 Feb 2018 07:01  -  02 Feb 2018 07:00  --------------------------------------------------------  IN: 1600 mL / OUT: 0 mL / NET: 1600 mL    02 Feb 2018 07:01  -  02 Feb 2018 14:38  --------------------------------------------------------  IN: 120 mL / OUT: 0 mL / NET: 120 mL          Physical Exam:   GENERAL: NAD, well-groomed, well-developed  HEENT: MOHSEN/   Atraumatic, Normocephalic  ENMT: No tonsillar erythema, exudates, or enlargement; Moist mucous membranes, Good dentition, No lesions  NECK: Supple, No JVD, Normal thyroid  CHEST/LUNG: Clear to auscultaionrubs  CVS: Regular rate and rhythm; No murmurs, rubs, or gallops  GI: : Soft, Nontender, Nondistended; Bowel sounds present  NERVOUS SYSTEM:  Alert & Oriented X3  EXTREMITIES:  2+ Peripheral Pulses, No clubbing, cyanosis, or edema  LYMPH: No lymphadenopathy noted  SKIN: No rashes or lesions  ENDOCRINOLOGY: No Thyromegaly  PSYCH: Appropriate    Labs:                              9.8    5.58  )-----------( 263      ( 02 Feb 2018 04:00 )             31.2                         10.4   8.03  )-----------( 295      ( 01 Feb 2018 06:05 )             32.2                         10.1   8.76  )-----------( 282      ( 31 Jan 2018 05:00 )             31.4                         9.8    8.26  )-----------( 292      ( 30 Jan 2018 09:19 )             30.7     02-02    138  |  100  |  35<H>  ----------------------------<  315<H>  4.0   |  23  |  1.70<H>  02-01    140  |  104  |  29<H>  ----------------------------<  145<H>  4.5   |  21<L>  |  1.61<H>  01-31    139  |  100  |  28<H>  ----------------------------<  122<H>  3.8   |  22  |  1.56<H>  01-30    139  |  101  |  30<H>  ----------------------------<  178<H>  4.0   |  24  |  1.53<H>    Ca    8.5      02 Feb 2018 04:00  Ca    8.7      01 Feb 2018 06:05  Mg     1.8     02-02  Mg     2.0     02-01      CAPILLARY BLOOD GLUCOSE      POCT Blood Glucose.: 213 mg/dL (02 Feb 2018 13:18)  POCT Blood Glucose.: 287 mg/dL (02 Feb 2018 08:56)  POCT Blood Glucose.: 230 mg/dL (01 Feb 2018 21:37)  POCT Blood Glucose.: 145 mg/dL (01 Feb 2018 17:03)            Cultures:           Wound culture:                01-30 @ 15:33  Organism --  Culture w/ gram stain --  Specimen Source BLOOD VENOUS      Abscess culture:             01-30 @ 15:33  Organism --  Gram Stain --  Specimen Source BLOOD VENOUS        Tissue culture:           01-30 @ 15:33  Organism --  Gram Stain --  Specimen Source BLOOD VENOUS      Body Fluid Smear & Culture:                        01-30 @ 15:33  AFB Smear  --  Culture Acid Fast Body Fluid w/ Smear  --  Culture Acid Fast Smear Concentrated   --    Culture Results:     --  Specimen Source BLOOD VENOUS        Rapid Respiratory Viral Panel Result        01-31 @ 20:56  Rapid RVP --  Coronovirus --  Adenovirus NOT DETECTED  Bordetella Pertussis NOT DETECTED  Chlamydia Pneumonia NOT DETECTED  Entero/RhinovirusNOT DETECTED  HKU1 Coronovirus --  HMPV Coronovirus NOT DETECTED  Influenza A --  Influenza AH1 NOT DETECTED  Influenza AH1 2009 NOT DETECTED  Influenza AH3 POSITIVE  Influenza B NOT DETECTED  Mycoplasma Pneumoniae NOT DETECTED This nucleic acid amplification assay was performed using  the [employer]. The following pathogens are tested  for: Adenovirus, Coronavirus 229E, Coronavirus HKU1,  Coronavirus NL63, Coronavirus OC43, Human Metapneumovirus  (HMPV), Rhinovirus/Enterovirus, Influenza A H1, Influenza A  H1 2009 (Pandemic H1 2009), Influenza A H3, Influenza A (Flu  A) subtype not identified, Influenza B, Parainfluenza Virus  (types 1, 2, 3, 4), Respiratory Syncytial Virus (RSV),  Bordetella pertussis, Chlamydophila pneumoniae, and  Mycoplasma pneumoniae. A negative FilmArray result does not  always exclude the possibility of viral or bacterial  infection. Laboratory results should always be interpreted  in the context of clinical findings.  NL63 Coronovirus --  OC43 Coronovirus --  Parainfluenza 1 NOT DETECTED  Parainfluenza 2 NOT DETECTED  Parainfluenza 3 NOT DETECTED  Parainfluenza 4 NOT DETECTED  Resp Syncytial Virus NOT DETECTED    Rapid Respiratory Viral Panel Result        01-30 @ 14:15  Rapid RVP --  Coronovirus --  Adenovirus NOT DETECTED  Bordetella Pertussis NOT DETECTED  Chlamydia Pneumonia NOT DETECTED  Entero/RhinovirusNOT DETECTED  HKU1 Coronovirus --  HMPV Coronovirus NOT DETECTED  Influenza A NOT DETECTED (any subtype)  Influenza AH1 NOT DETECTED  Influenza AH1 2009 NOT DETECTED  Influenza AH3 NOT DETECTED  Influenza B NOT DETECTED  Mycoplasma Pneumoniae NOT DETECTED This nucleic acid amplification assay was performed using  the [employer]. The following pathogens are tested  for: Adenovirus, Coronavirus 229E, Coronavirus HKU1,  Coronavirus NL63, Coronavirus OC43, Human Metapneumovirus  (HMPV), Rhinovirus/Enterovirus, Influenza A H1, Influenza A  H1 2009 (Pandemic H1 2009), Influenza A H3, Influenza A (Flu  A) subtype not identified, Influenza B, Parainfluenza Virus  (types 1, 2, 3, 4), Respiratory Syncytial Virus (RSV),  Bordetella pertussis, Chlamydophila pneumoniae, and  Mycoplasma pneumoniae. A negative FilmArray result does not  always exclude the possibility of viral or bacterial  infection. Laboratory results should always be interpreted  in the context of clinical findings.  NL63 Coronovirus --  OC43 Coronovirus --  Parainfluenza 1 NOT DETECTED  Parainfluenza 2 NOT DETECTED  Parainfluenza 3 NOT DETECTED  Parainfluenza 4 NOT DETECTED  Resp Syncytial Virus NOT DETECTED          Studies  Chest X-RAY  CT SCAN Chest   Venous Dopplers: LE:   CT Abdomen  Others      < from: Xray Chest 1 View- PORTABLE-Routine (02.01.18 @ 08:05) >  TECHNIQUE:   AP Portable chest x-ray.    INTERPRETATION:     Heart size and the mediastinum cannot be accurately evaluated on this   projection. Coronary artery calcification and/or stent is seen.  Median sternotomy sutures and surgical clips are again seen.  There is minimal linear atelectasis versus scar in the left lower lung.   The lungs are otherwise clear.  No pleural effusion is noted.  The visualized bony structures appear intact.            IMPRESSION:  Minimal linear atelectasis versus scar in the left lower   lung. Otherwise clear lungs.   Please see report of the CT scan of the chest for any further detail.                  CATRACHITA KEENAN M.D., ATTENDING RADIOLOGIST  This document has been electronically signed. Feb 1 2018 12:00PM        < end of copied text >      < from: CT Chest No Cont (01.31.18 @ 17:59) >  YS, LUNGS AND PLEURA: Patent central airways. Previously noted   groundglass opacities within bothlungs are no longer present. Previously   noted bilateral pleural effusions have resolved.  There is no pneumothorax.  VESSELS: Thoracic aorta is normal in caliber. The aorta and coronary   arteries are calcified.  HEART: Cardiomegaly. There is no pericardial effusion.   MEDIASTINUM: No significant mediastinal or hilar adenopathy is seen.   NECK AND CHEST WALL: The visualized thyroid is homogeneous. There is no   significant supraclavicular or axillary lymphadenopathy.  UPPER ABDOMEN: The visualized upper abdomen is unremarkable.  BONES: Status post median sternotomy.    IMPRESSION:  Resolution of the previously noted bilateral pleural effusions and   groundglass opacities within both lungs when compared to previous exam.              KENDALL GRANT M.D., RADIOLOGY RESIDENT  This document has been electronically signed.  IRVING MERCEDES M.D., ATTENDING RADIOLOGIST  This document has been electronically signed. Feb 1 2018 12:23PM    < end of copied text >

## 2018-02-02 NOTE — PROGRESS NOTE ADULT - ASSESSMENT
71 Female, with a PmHx of HTN, CKD, Hyperlipidemia, DM-II, CAD s/p CABG x 3, s/p stents, presenting to the [facility] ED c/o shortness of breath with HF with fever and URI symptoms

## 2018-02-02 NOTE — PROGRESS NOTE ADULT - ASSESSMENT
71 Female, with a PmHx of HTN, CKD, Hyperlipidemia, DM-II, CAD s/p CABG x 3, s/p stents, presenting to the [facility] ED c/o shortness of breath. Pt is being admitted to telemetry for acute on chronic systolic heart failure.  Now she is with new fever, as well as URI

## 2018-02-02 NOTE — PROGRESS NOTE ADULT - SUBJECTIVE AND OBJECTIVE BOX
SELVIN CLAIRE 72y [id number]    Patient is a 72y old  Female who presents with a chief complaint of SOB (25 Jan 2018 15:14)      Follow Up/CC:  fever    Interval History/ROS: c/o congestion    Allergies    No Known Allergies    Intolerances        ANTIMICROBIALS:  oseltamivir 30 two times a day      MEDICATIONS  (STANDING):  aspirin enteric coated 81 milliGRAM(s) Oral daily  atorvastatin 40 milliGRAM(s) Oral at bedtime  dextrose 5%. 1000 milliLiter(s) (50 mL/Hr) IV Continuous <Continuous>  dextrose 50% Injectable 12.5 Gram(s) IV Push once  dextrose 50% Injectable 25 Gram(s) IV Push once  dextrose 50% Injectable 25 Gram(s) IV Push once  famotidine    Tablet 20 milliGRAM(s) Oral daily  furosemide    Tablet 40 milliGRAM(s) Oral daily  heparin  Injectable 5000 Unit(s) SubCutaneous every 12 hours  insulin glargine Injectable (LANTUS) 20 Unit(s) SubCutaneous every morning  insulin glargine Injectable (LANTUS) 68 Unit(s) SubCutaneous at bedtime  insulin lispro (HumaLOG) corrective regimen sliding scale   SubCutaneous three times a day before meals  insulin lispro (HumaLOG) corrective regimen sliding scale   SubCutaneous at bedtime  insulin lispro Injectable (HumaLOG) 24 Unit(s) SubCutaneous three times a day before meals  levothyroxine 50 MICROGram(s) Oral daily  metoprolol succinate ER 12.5 milliGRAM(s) Oral daily  montelukast 10 milliGRAM(s) Oral at bedtime  oseltamivir 30 milliGRAM(s) Oral two times a day  sodium bicarbonate 650 milliGRAM(s) Oral three times a day  sodium chloride 0.9% lock flush 3 milliLiter(s) IV Push every 8 hours  ticagrelor 90 milliGRAM(s) Oral two times a day    MEDICATIONS  (PRN):  acetaminophen   Tablet 650 milliGRAM(s) Oral every 6 hours PRN For Temp greater than 38 C (100.4 F)  acetaminophen   Tablet. 650 milliGRAM(s) Oral every 6 hours PRN Mild, moderate and severe pain  benzocaine 15 mG/menthol 3.6 mG Lozenge 1 Lozenge Oral every 6 hours PRN Sore Throat  dextrose Gel 1 Dose(s) Oral once PRN Blood Glucose LESS THAN 70 milliGRAM(s)/deciliter  glucagon  Injectable 1 milliGRAM(s) IntraMuscular once PRN Glucose LESS THAN 70 milligrams/deciliter  guaiFENesin    Syrup 100 milliGRAM(s) Oral every 6 hours PRN Cough  sodium chloride 0.65% Nasal 1 Spray(s) Both Nostrils three times a day PRN Nasal Congestion        Vital Signs Last 24 Hrs  T(C): 36.7 (02 Feb 2018 14:56), Max: 37.1 (02 Feb 2018 05:47)  T(F): 98 (02 Feb 2018 14:56), Max: 98.7 (02 Feb 2018 05:47)  HR: 86 (02 Feb 2018 14:56) (80 - 86)  BP: 106/61 (02 Feb 2018 14:56) (106/61 - 128/67)  BP(mean): --  RR: 17 (02 Feb 2018 14:56) (16 - 17)  SpO2: 100% (02 Feb 2018 14:56) (98% - 100%)    CBC Full  -  ( 02 Feb 2018 04:00 )  WBC Count : 5.58 K/uL  Hemoglobin : 9.8 g/dL  Hematocrit : 31.2 %  Platelet Count - Automated : 263 K/uL  Mean Cell Volume : 84.6 fL  Mean Cell Hemoglobin : 26.6 pg  Mean Cell Hemoglobin Concentration : 31.4 %  Auto Neutrophil # : 2.12 K/uL  Auto Lymphocyte # : 2.13 K/uL  Auto Monocyte # : 0.72 K/uL  Auto Eosinophil # : 0.55 K/uL  Auto Basophil # : 0.02 K/uL  Auto Neutrophil % : 37.9 %  Auto Lymphocyte % : 38.2 %  Auto Monocyte % : 12.9 %  Auto Eosinophil % : 9.9 %  Auto Basophil % : 0.4 %    02-02    138  |  100  |  35<H>  ----------------------------<  315<H>  4.0   |  23  |  1.70<H>    Ca    8.5      02 Feb 2018 04:00  Mg     1.8     02-02            MICROBIOLOGY:  BLOOD VENOUS  01-30-18 --  --  --              v            RADIOLOGY

## 2018-02-03 LAB
BASOPHILS # BLD AUTO: 0.03 K/UL — SIGNIFICANT CHANGE UP (ref 0–0.2)
BASOPHILS NFR BLD AUTO: 0.4 % — SIGNIFICANT CHANGE UP (ref 0–2)
BUN SERPL-MCNC: 31 MG/DL — HIGH (ref 7–23)
CALCIUM SERPL-MCNC: 8.7 MG/DL — SIGNIFICANT CHANGE UP (ref 8.4–10.5)
CHLORIDE SERPL-SCNC: 101 MMOL/L — SIGNIFICANT CHANGE UP (ref 98–107)
CO2 SERPL-SCNC: 23 MMOL/L — SIGNIFICANT CHANGE UP (ref 22–31)
CREAT SERPL-MCNC: 1.52 MG/DL — HIGH (ref 0.5–1.3)
EOSINOPHIL # BLD AUTO: 0.74 K/UL — HIGH (ref 0–0.5)
EOSINOPHIL NFR BLD AUTO: 9.2 % — HIGH (ref 0–6)
GLUCOSE BLDC GLUCOMTR-MCNC: 162 MG/DL — HIGH (ref 70–99)
GLUCOSE BLDC GLUCOMTR-MCNC: 170 MG/DL — HIGH (ref 70–99)
GLUCOSE BLDC GLUCOMTR-MCNC: 240 MG/DL — HIGH (ref 70–99)
GLUCOSE BLDC GLUCOMTR-MCNC: 252 MG/DL — HIGH (ref 70–99)
GLUCOSE SERPL-MCNC: 220 MG/DL — HIGH (ref 70–99)
HCT VFR BLD CALC: 32.3 % — LOW (ref 34.5–45)
HGB BLD-MCNC: 10 G/DL — LOW (ref 11.5–15.5)
IMM GRANULOCYTES # BLD AUTO: 0.07 # — SIGNIFICANT CHANGE UP
IMM GRANULOCYTES NFR BLD AUTO: 0.9 % — SIGNIFICANT CHANGE UP (ref 0–1.5)
LYMPHOCYTES # BLD AUTO: 2.91 K/UL — SIGNIFICANT CHANGE UP (ref 1–3.3)
LYMPHOCYTES # BLD AUTO: 36.1 % — SIGNIFICANT CHANGE UP (ref 13–44)
MAGNESIUM SERPL-MCNC: 1.8 MG/DL — SIGNIFICANT CHANGE UP (ref 1.6–2.6)
MCHC RBC-ENTMCNC: 26.3 PG — LOW (ref 27–34)
MCHC RBC-ENTMCNC: 31 % — LOW (ref 32–36)
MCV RBC AUTO: 85 FL — SIGNIFICANT CHANGE UP (ref 80–100)
MONOCYTES # BLD AUTO: 0.67 K/UL — SIGNIFICANT CHANGE UP (ref 0–0.9)
MONOCYTES NFR BLD AUTO: 8.3 % — SIGNIFICANT CHANGE UP (ref 2–14)
NEUTROPHILS # BLD AUTO: 3.63 K/UL — SIGNIFICANT CHANGE UP (ref 1.8–7.4)
NEUTROPHILS NFR BLD AUTO: 45.1 % — SIGNIFICANT CHANGE UP (ref 43–77)
NRBC # FLD: 0 — SIGNIFICANT CHANGE UP
PLATELET # BLD AUTO: 298 K/UL — SIGNIFICANT CHANGE UP (ref 150–400)
PMV BLD: 9.6 FL — SIGNIFICANT CHANGE UP (ref 7–13)
POTASSIUM SERPL-MCNC: 3.7 MMOL/L — SIGNIFICANT CHANGE UP (ref 3.5–5.3)
POTASSIUM SERPL-SCNC: 3.7 MMOL/L — SIGNIFICANT CHANGE UP (ref 3.5–5.3)
RBC # BLD: 3.8 M/UL — SIGNIFICANT CHANGE UP (ref 3.8–5.2)
RBC # FLD: 15.5 % — HIGH (ref 10.3–14.5)
SODIUM SERPL-SCNC: 140 MMOL/L — SIGNIFICANT CHANGE UP (ref 135–145)
WBC # BLD: 8.05 K/UL — SIGNIFICANT CHANGE UP (ref 3.8–10.5)
WBC # FLD AUTO: 8.05 K/UL — SIGNIFICANT CHANGE UP (ref 3.8–10.5)

## 2018-02-03 RX ADMIN — TICAGRELOR 90 MILLIGRAM(S): 90 TABLET ORAL at 05:27

## 2018-02-03 RX ADMIN — Medication 24 UNIT(S): at 09:11

## 2018-02-03 RX ADMIN — Medication 81 MILLIGRAM(S): at 13:18

## 2018-02-03 RX ADMIN — Medication 12.5 MILLIGRAM(S): at 05:27

## 2018-02-03 RX ADMIN — Medication 50 MICROGRAM(S): at 05:27

## 2018-02-03 RX ADMIN — FAMOTIDINE 20 MILLIGRAM(S): 10 INJECTION INTRAVENOUS at 13:18

## 2018-02-03 RX ADMIN — SODIUM CHLORIDE 3 MILLILITER(S): 9 INJECTION INTRAMUSCULAR; INTRAVENOUS; SUBCUTANEOUS at 21:59

## 2018-02-03 RX ADMIN — INSULIN GLARGINE 20 UNIT(S): 100 INJECTION, SOLUTION SUBCUTANEOUS at 09:11

## 2018-02-03 RX ADMIN — ATORVASTATIN CALCIUM 40 MILLIGRAM(S): 80 TABLET, FILM COATED ORAL at 21:58

## 2018-02-03 RX ADMIN — Medication 30 MILLIGRAM(S): at 05:27

## 2018-02-03 RX ADMIN — Medication 30 MILLIGRAM(S): at 17:39

## 2018-02-03 RX ADMIN — Medication 650 MILLIGRAM(S): at 05:27

## 2018-02-03 RX ADMIN — Medication 2: at 13:19

## 2018-02-03 RX ADMIN — Medication 24 UNIT(S): at 13:19

## 2018-02-03 RX ADMIN — SODIUM CHLORIDE 3 MILLILITER(S): 9 INJECTION INTRAMUSCULAR; INTRAVENOUS; SUBCUTANEOUS at 13:13

## 2018-02-03 RX ADMIN — Medication 650 MILLIGRAM(S): at 13:18

## 2018-02-03 RX ADMIN — Medication 6: at 09:11

## 2018-02-03 RX ADMIN — TICAGRELOR 90 MILLIGRAM(S): 90 TABLET ORAL at 17:39

## 2018-02-03 RX ADMIN — Medication 40 MILLIGRAM(S): at 05:27

## 2018-02-03 RX ADMIN — MONTELUKAST 10 MILLIGRAM(S): 4 TABLET, CHEWABLE ORAL at 21:59

## 2018-02-03 RX ADMIN — HEPARIN SODIUM 5000 UNIT(S): 5000 INJECTION INTRAVENOUS; SUBCUTANEOUS at 17:39

## 2018-02-03 RX ADMIN — Medication 100 MILLIGRAM(S): at 05:38

## 2018-02-03 RX ADMIN — Medication 24 UNIT(S): at 17:39

## 2018-02-03 RX ADMIN — INSULIN GLARGINE 68 UNIT(S): 100 INJECTION, SOLUTION SUBCUTANEOUS at 22:05

## 2018-02-03 RX ADMIN — Medication 2: at 17:39

## 2018-02-03 RX ADMIN — HEPARIN SODIUM 5000 UNIT(S): 5000 INJECTION INTRAVENOUS; SUBCUTANEOUS at 05:28

## 2018-02-03 RX ADMIN — Medication 650 MILLIGRAM(S): at 21:58

## 2018-02-03 RX ADMIN — SODIUM CHLORIDE 3 MILLILITER(S): 9 INJECTION INTRAMUSCULAR; INTRAVENOUS; SUBCUTANEOUS at 05:28

## 2018-02-03 NOTE — PROGRESS NOTE ADULT - SUBJECTIVE AND OBJECTIVE BOX
Subjective:  no CP or SOB, c/o nasal congestion & cough, now +FLU      MEDICATIONS  (STANDING):  aspirin enteric coated 81 milliGRAM(s) Oral daily  atorvastatin 40 milliGRAM(s) Oral at bedtime  dextrose 5%. 1000 milliLiter(s) (50 mL/Hr) IV Continuous <Continuous>  dextrose 50% Injectable 12.5 Gram(s) IV Push once  dextrose 50% Injectable 25 Gram(s) IV Push once  dextrose 50% Injectable 25 Gram(s) IV Push once  famotidine    Tablet 20 milliGRAM(s) Oral daily  furosemide    Tablet 40 milliGRAM(s) Oral daily  heparin  Injectable 5000 Unit(s) SubCutaneous every 12 hours  insulin glargine Injectable (LANTUS) 20 Unit(s) SubCutaneous every morning  insulin glargine Injectable (LANTUS) 68 Unit(s) SubCutaneous at bedtime  insulin lispro (HumaLOG) corrective regimen sliding scale   SubCutaneous three times a day before meals  insulin lispro (HumaLOG) corrective regimen sliding scale   SubCutaneous at bedtime  insulin lispro Injectable (HumaLOG) 24 Unit(s) SubCutaneous three times a day before meals  levothyroxine 50 MICROGram(s) Oral daily  metoprolol succinate ER 12.5 milliGRAM(s) Oral daily  montelukast 10 milliGRAM(s) Oral at bedtime  oseltamivir 30 milliGRAM(s) Oral two times a day  sodium bicarbonate 650 milliGRAM(s) Oral three times a day  sodium chloride 0.9% lock flush 3 milliLiter(s) IV Push every 8 hours  ticagrelor 90 milliGRAM(s) Oral two times a day    MEDICATIONS  (PRN):  acetaminophen   Tablet 650 milliGRAM(s) Oral every 6 hours PRN For Temp greater than 38 C (100.4 F)  acetaminophen   Tablet. 650 milliGRAM(s) Oral every 6 hours PRN Mild, moderate and severe pain  benzocaine 15 mG/menthol 3.6 mG Lozenge 1 Lozenge Oral every 6 hours PRN Sore Throat  dextrose Gel 1 Dose(s) Oral once PRN Blood Glucose LESS THAN 70 milliGRAM(s)/deciliter  glucagon  Injectable 1 milliGRAM(s) IntraMuscular once PRN Glucose LESS THAN 70 milligrams/deciliter  guaiFENesin    Syrup 100 milliGRAM(s) Oral every 6 hours PRN Cough  sodium chloride 0.65% Nasal 1 Spray(s) Both Nostrils three times a day PRN Nasal Congestion      LABS:                        10.0   8.05  )-----------( 298      ( 03 Feb 2018 07:40 )             32.3     Hemoglobin: 10.0 g/dL (02-03 @ 07:40)  Hemoglobin: 9.8 g/dL (02-02 @ 04:00)  Hemoglobin: 10.4 g/dL (02-01 @ 06:05)  Hemoglobin: 10.1 g/dL (01-31 @ 05:00)  Hemoglobin: 9.8 g/dL (01-30 @ 09:19)    02-03    140  |  101  |  31<H>  ----------------------------<  220<H>  3.7   |  23  |  1.52<H>    Ca    8.7      03 Feb 2018 07:40  Mg     1.8     02-03      Creatinine Trend: 1.52<--, 1.70<--, 1.61<--, 1.56<--, 1.53<--, 1.49<--     PHYSICAL EXAM  Vital Signs Last 24 Hrs  T(C): 36.5 (03 Feb 2018 13:56), Max: 36.9 (02 Feb 2018 21:25)  T(F): 97.7 (03 Feb 2018 13:56), Max: 98.4 (02 Feb 2018 21:25)  HR: 84 (03 Feb 2018 13:56) (81 - 90)  BP: 113/61 (03 Feb 2018 13:56) (111/66 - 118/76)  BP(mean): --  RR: 18 (03 Feb 2018 13:56) (16 - 18)  SpO2: 100% (03 Feb 2018 13:56) (100% - 100%)    Cardiovascular: S1S2 RRR  Respiratory: CTA BL   Gastrointestinal:  Soft, Non-tender, + BS	  Extremities No edema, cyanosis or clubbing  B/L LE's     DIAGNOSTIC DATA:     TELEMETRY: SR 	       < from: Cardiac Cath Lab - Adult (10.13.17 @ 10:40) >  VENTRICLES: No left ventriculogram was performed.  CORONARY VESSELS: The coronary circulation is right dominant.  LM:   --  LM: Normal.  LAD:   --  Proximal LAD: There was a diffuse 60 % stenosis.  --  Mid LAD: There was a 100 % stenosis with the distal vessel being filled  via LIMA-LAD.  CX:   --  Circumflex: The vessel was small sized. Angiography showed mild  atherosclerosis withno flow limiting lesions.  --  OM1: Angiography showed mild atherosclerosis with no flow limiting  lesions.  RI:   --  Ostial ramus intermedius: There was a tubular 80 % stenosis.  There was PARUL grade 3 flow through the vessel (brisk flow).  RCA:   --  Proximal RCA: Angiography showed mild atherosclerosis with no  flow limiting lesions.  --  Mid RCA: There was a 100 % stenosis with the distal vessel being filled  via collaterals. This lesion is a chronic total occlusion.  GRAFTS:   --  Graft to the LAD: The graft was a LIMA. Graft angiography  showed minor luminal irregularities. Distal vessel angiography showed  minor luminal irregularities.  AORTA: Ascending aorta: Normal. No additional grafts visualized in  aortogram.  COMPLICATIONS: There were no complications.  DIAGNOSTIC RECOMMENDATIONS: Coronary angiogram demonstrates patent  LIMA-LAD, closed SVG grafts, and a severe stenosis in the ostial Ramus.  Will perform staged PCI to RI when Cr stable and renally optimized.  Prepared and signed by  Corie Ga M.D.  Signed 10/17/2017 13:49:15    < end of copied text >    < from: Cardiac Cath Lab - Adult (11.17.17 @ 11:48) >  PROCEDURE:  --  Intervention on ramus intermedius: drug-eluting stent.    VENTRICLES: No LV gram was performed; however, a recent echocardiogram  demonstrated normal global and regional LV function.  CORONARY VESSELS: The coronary circulation is right dominant.  LM:   --  LM: Angiography showed minor luminal irregularities with no flow  limiting lesions.  LAD:   --  Ostial LAD: There was a 100 % stenosis.  CX:   --  Circumflex: This vessel was not injected, but was visualized  during a prior cardiac catheterization.  RI:   --  Ramus intermedius: There was a 95 % stenosis.  RCA:   --  RCA: This vessel was not injected.  COMPLICATIONS: There were no complications.  DIAGNOSTIC RECOMMENDATIONS: The patient should continue with the present  medications. will add plavix 75mg po once a day/ will add ECASA 325mg po  once day.  Prepared and signed by  Roberta Quick M.D.  Signed 11/17/2017 13:16:01    < end of copied text >    < from: TTE with Doppler (w/Cont) (11.25.17 @ 12:33) >  CONCLUSIONS:  1. Mitral annular calcification, otherwise normal mitral  valve. Mild mitral regurgitation.  2. Normal left ventricular internal dimensions and wall  thicknesses.  3. Endocardium not well visualized; grossly moderate to  severe segmental left ventricularsystolic dysfunction.  Hypokinesis of the inferior, lateral, and inferolateral  walls.  Endocardial visualization enhanced with intravenous  injection of echo contrast (Definity).  No LV thrombus  seen.  4. The right ventricle is not well visualized;grossly  normal right ventricular systolic function.  ------------------------------------------------------------------------  Confirmed on  11/25/2017 - 14:44:41 by Jose Lucia M.D.    < end of copied text >    < from: Cardiac Cath Lab - Adult (12.05.17 @ 12:48) >  VENTRICLES: No LV gram was performed; however, a recent echocardiogram  demonstrated an EF of 36 %.  CORONARY VESSELS: The coronary circulation is right dominant.  LM:   --  Distal left main: Angiography showed minor luminal irregularities  with no flow limiting lesions.  LAD:   --  Mid LAD: There was a 100 % stenosis with the distal vessel being  filled via LIMA-LAD.  CX:   --  Proximal circumflex: There was a tubular 20 % stenosis.  --  Mid circumflex: Angiography showed minor luminal irregularities with no  flow limiting lesions.  --  Distal circumflex: Angiography showed minor luminal irregularities with  no flow limiting lesions.  --  OM1: The vessel was small sized. There was a discrete 60 % stenosis.  RI:   --  Ostial ramus intermedius: Angiography showed minor luminal  irregularities with no flow limiting lesions. There was no significant  restenosis in RI stent.  --  Proximal ramus intermedius: Angiography showed minor luminal  irregularities with no flow limiting lesions.  --  Mid ramus intermedius: There was a tubular 80 % stenosis. The lesion  was associated with a small filling defect consistent with thrombus.  RCA:   --  Mid RCA: There was a 100 % stenosis with the distal vessel being  filled via collaterals from the LAD.  GRAFTS:   --  Graft to the LAD: The graft was a LIMA. Graft angiography  showed minor luminal irregularities. Distal vessel angiography showed  minor luminalirregularities.  COMPLICATIONS: There were no complications.  DIAGNOSTIC RECOMMENDATIONS: Coronary angiogram demonstrates a severe  stenosis in the ramus intermedius that is the cause of the patient's  clinical presentation. Will therefore perform PCI to the RI.  INTERVENTIONAL RECOMMENDATIONS: S/p successful CORNELIA to the Ramus  Intermedius. The patient should continue with ASA and Brilinta for at  least 12 months.  Prepared and signed by  Corie Ga M.D.  Signed 12/06/2017 17:06:30    < from: CT Chest No Cont (01.20.18 @ 09:55) >  IMPRESSION:   Mild pulmonary edema with small simple bilateral pleural effusions, right   greater than left, with no evidence of loculation.    < end of copied text >    < from: TTE with Doppler (w/Cont) (01.23.18 @ 12:31) >  CONCLUSIONS:  1. Mitral annular calcification, otherwise normal mitral  valve. Mild-moderate mitral regurgitation.  2. Calcified trileaflet aortic valve with normal opening.  Mild aortic regurgitation.  3. Normal left ventricular internal dimensions and wall  thicknesses.  4. Endocardium not well visualized; grossly severe global  left ventricular systolic dysfunction.  Endocardial  visualization enhanced with intravenous injection of echo  contrast (Definity).  5. The right ventricle is not well visualized; grossly  normal right ventricular systolic function.  *** Compared with echocardiogram of 11/25/2017, no  significant changes noted.  ------------------------------------------------------------------------  Confirmed on  1/23/2018 - 14:35:49 by Jose Lucia M.D.    < end of copied text >    < from: Nuclear Stress Test-Pharmacologic (01.28.18 @ 12:31) >  IMPRESSIONS:Abnormal Study  * Myocardial Perfusion SPECT results are abnormal.  * There is a medium sized, mild to moderate defect in  anterior wall that is reversible, suggestive of  ischemia.There are large, moderate to severe defects in  inferolateral, lateral walls that are fixed, suggestive of  infarct.  * Post-stress gated wall motion analysis was performed  (LVEF = 33 %;LVEDV = 116 ml.), revealing severe overall  hypokinesis. There was focal inferiolateral akinesis and  severe lateral hypokinesis with absence of systolic  thickening. The best motion was in the septum.  *** Compared with Nuclear/Stress test of 11/5/2014, the  observed defect are now more extensive, suggesting  progression of disease. Prior LVEF was 39% with EDV 77 mL.  ------------------------------------------------------------------------  Revised on  1/29/2018 - 16:44:19 by Lorenzo Covarrubias M.D.  Confirmed on  1/30/2018 - 15:45:44 by José Hansen M.D.  < end of copied text >    < from: CT Chest No Cont (01.31.18 @ 17:59) >  IMPRESSION:  Resolution of the previously noted bilateral pleural effusions and   groundglass opacities within both lungs when compared to previous exam.    < end of copied text >    ASSESSMENT/PLAN: 	72y Female w/ PMH HTN, CKD, Hyperlipidemia, DM-II, CAD s/p 3V CABG  (LIMA to LAD, SVG to OM, SVG to PDA) at [facility] 2014, s/p CORNELIA TO RI 11/17/17, NSTEMI 12/2017 s/p C on 12/5 and CORNELIA to D [place], TTE at that time revealed grossly moderate to severe LV dysfunction with hypokinesis of the inferior, lateral and inferolateral with an EF of 36% admitted with acute on chronic systolic CHF, s/p IV diureses, s/p NST as above, now s/p fever 1/30/18--RVP negative     --Cont PO Lasix, patient is Euvolemic  --mildly elevated trops noted in setting of CKD & CHF exacerbation  - no need for urgent repeat cath at this time  --Repeat TTE unchanged from prior. EF 32%  --Cont. DAPT for recent CORNELIA  --+FLU , now on tamiflu till 2/5   --NST noted above plan for CATH when flu resolves & pt medically optimized  family and patient aware/ agree with plan Subjective:  no CP or SOB, c/o nasal congestion & cough, now +FLU      MEDICATIONS  (STANDING):  aspirin enteric coated 81 milliGRAM(s) Oral daily  atorvastatin 40 milliGRAM(s) Oral at bedtime  dextrose 5%. 1000 milliLiter(s) (50 mL/Hr) IV Continuous <Continuous>  dextrose 50% Injectable 12.5 Gram(s) IV Push once  dextrose 50% Injectable 25 Gram(s) IV Push once  dextrose 50% Injectable 25 Gram(s) IV Push once  famotidine    Tablet 20 milliGRAM(s) Oral daily  furosemide    Tablet 40 milliGRAM(s) Oral daily  heparin  Injectable 5000 Unit(s) SubCutaneous every 12 hours  insulin glargine Injectable (LANTUS) 20 Unit(s) SubCutaneous every morning  insulin glargine Injectable (LANTUS) 68 Unit(s) SubCutaneous at bedtime  insulin lispro (HumaLOG) corrective regimen sliding scale   SubCutaneous three times a day before meals  insulin lispro (HumaLOG) corrective regimen sliding scale   SubCutaneous at bedtime  insulin lispro Injectable (HumaLOG) 24 Unit(s) SubCutaneous three times a day before meals  levothyroxine 50 MICROGram(s) Oral daily  metoprolol succinate ER 12.5 milliGRAM(s) Oral daily  montelukast 10 milliGRAM(s) Oral at bedtime  oseltamivir 30 milliGRAM(s) Oral two times a day  sodium bicarbonate 650 milliGRAM(s) Oral three times a day  sodium chloride 0.9% lock flush 3 milliLiter(s) IV Push every 8 hours  ticagrelor 90 milliGRAM(s) Oral two times a day    MEDICATIONS  (PRN):  acetaminophen   Tablet 650 milliGRAM(s) Oral every 6 hours PRN For Temp greater than 38 C (100.4 F)  acetaminophen   Tablet. 650 milliGRAM(s) Oral every 6 hours PRN Mild, moderate and severe pain  benzocaine 15 mG/menthol 3.6 mG Lozenge 1 Lozenge Oral every 6 hours PRN Sore Throat  dextrose Gel 1 Dose(s) Oral once PRN Blood Glucose LESS THAN 70 milliGRAM(s)/deciliter  glucagon  Injectable 1 milliGRAM(s) IntraMuscular once PRN Glucose LESS THAN 70 milligrams/deciliter  guaiFENesin    Syrup 100 milliGRAM(s) Oral every 6 hours PRN Cough  sodium chloride 0.65% Nasal 1 Spray(s) Both Nostrils three times a day PRN Nasal Congestion      LABS:                        10.0   8.05  )-----------( 298      ( 03 Feb 2018 07:40 )             32.3     Hemoglobin: 10.0 g/dL (02-03 @ 07:40)  Hemoglobin: 9.8 g/dL (02-02 @ 04:00)  Hemoglobin: 10.4 g/dL (02-01 @ 06:05)  Hemoglobin: 10.1 g/dL (01-31 @ 05:00)  Hemoglobin: 9.8 g/dL (01-30 @ 09:19)    02-03    140  |  101  |  31<H>  ----------------------------<  220<H>  3.7   |  23  |  1.52<H>    Ca    8.7      03 Feb 2018 07:40  Mg     1.8     02-03      Creatinine Trend: 1.52<--, 1.70<--, 1.61<--, 1.56<--, 1.53<--, 1.49<--     PHYSICAL EXAM  Vital Signs Last 24 Hrs  T(C): 36.5 (03 Feb 2018 13:56), Max: 36.9 (02 Feb 2018 21:25)  T(F): 97.7 (03 Feb 2018 13:56), Max: 98.4 (02 Feb 2018 21:25)  HR: 84 (03 Feb 2018 13:56) (81 - 90)  BP: 113/61 (03 Feb 2018 13:56) (111/66 - 118/76)  BP(mean): --  RR: 18 (03 Feb 2018 13:56) (16 - 18)  SpO2: 100% (03 Feb 2018 13:56) (100% - 100%)    Cardiovascular: S1S2 RRR  Respiratory: CTA BL   Gastrointestinal:  Soft, Non-tender, + BS	  Extremities No edema, cyanosis or clubbing  B/L LE's     DIAGNOSTIC DATA:     TELEMETRY: SR 	       < from: Cardiac Cath Lab - Adult (10.13.17 @ 10:40) >  VENTRICLES: No left ventriculogram was performed.  CORONARY VESSELS: The coronary circulation is right dominant.  LM:   --  LM: Normal.  LAD:   --  Proximal LAD: There was a diffuse 60 % stenosis.  --  Mid LAD: There was a 100 % stenosis with the distal vessel being filled  via LIMA-LAD.  CX:   --  Circumflex: The vessel was small sized. Angiography showed mild  atherosclerosis withno flow limiting lesions.  --  OM1: Angiography showed mild atherosclerosis with no flow limiting  lesions.  RI:   --  Ostial ramus intermedius: There was a tubular 80 % stenosis.  There was PARUL grade 3 flow through the vessel (brisk flow).  RCA:   --  Proximal RCA: Angiography showed mild atherosclerosis with no  flow limiting lesions.  --  Mid RCA: There was a 100 % stenosis with the distal vessel being filled  via collaterals. This lesion is a chronic total occlusion.  GRAFTS:   --  Graft to the LAD: The graft was a LIMA. Graft angiography  showed minor luminal irregularities. Distal vessel angiography showed  minor luminal irregularities.  AORTA: Ascending aorta: Normal. No additional grafts visualized in  aortogram.  COMPLICATIONS: There were no complications.  DIAGNOSTIC RECOMMENDATIONS: Coronary angiogram demonstrates patent  LIMA-LAD, closed SVG grafts, and a severe stenosis in the ostial Ramus.  Will perform staged PCI to RI when Cr stable and renally optimized.  Prepared and signed by  Corie Ga M.D.  Signed 10/17/2017 13:49:15    < end of copied text >    < from: Cardiac Cath Lab - Adult (11.17.17 @ 11:48) >  PROCEDURE:  --  Intervention on ramus intermedius: drug-eluting stent.    VENTRICLES: No LV gram was performed; however, a recent echocardiogram  demonstrated normal global and regional LV function.  CORONARY VESSELS: The coronary circulation is right dominant.  LM:   --  LM: Angiography showed minor luminal irregularities with no flow  limiting lesions.  LAD:   --  Ostial LAD: There was a 100 % stenosis.  CX:   --  Circumflex: This vessel was not injected, but was visualized  during a prior cardiac catheterization.  RI:   --  Ramus intermedius: There was a 95 % stenosis.  RCA:   --  RCA: This vessel was not injected.  COMPLICATIONS: There were no complications.  DIAGNOSTIC RECOMMENDATIONS: The patient should continue with the present  medications. will add plavix 75mg po once a day/ will add ECASA 325mg po  once day.  Prepared and signed by  Roberta Quick M.D.  Signed 11/17/2017 13:16:01    < end of copied text >    < from: TTE with Doppler (w/Cont) (11.25.17 @ 12:33) >  CONCLUSIONS:  1. Mitral annular calcification, otherwise normal mitral  valve. Mild mitral regurgitation.  2. Normal left ventricular internal dimensions and wall  thicknesses.  3. Endocardium not well visualized; grossly moderate to  severe segmental left ventricularsystolic dysfunction.  Hypokinesis of the inferior, lateral, and inferolateral  walls.  Endocardial visualization enhanced with intravenous  injection of echo contrast (Definity).  No LV thrombus  seen.  4. The right ventricle is not well visualized;grossly  normal right ventricular systolic function.  ------------------------------------------------------------------------  Confirmed on  11/25/2017 - 14:44:41 by Jose Lucia M.D.    < end of copied text >    < from: Cardiac Cath Lab - Adult (12.05.17 @ 12:48) >  VENTRICLES: No LV gram was performed; however, a recent echocardiogram  demonstrated an EF of 36 %.  CORONARY VESSELS: The coronary circulation is right dominant.  LM:   --  Distal left main: Angiography showed minor luminal irregularities  with no flow limiting lesions.  LAD:   --  Mid LAD: There was a 100 % stenosis with the distal vessel being  filled via LIMA-LAD.  CX:   --  Proximal circumflex: There was a tubular 20 % stenosis.  --  Mid circumflex: Angiography showed minor luminal irregularities with no  flow limiting lesions.  --  Distal circumflex: Angiography showed minor luminal irregularities with  no flow limiting lesions.  --  OM1: The vessel was small sized. There was a discrete 60 % stenosis.  RI:   --  Ostial ramus intermedius: Angiography showed minor luminal  irregularities with no flow limiting lesions. There was no significant  restenosis in RI stent.  --  Proximal ramus intermedius: Angiography showed minor luminal  irregularities with no flow limiting lesions.  --  Mid ramus intermedius: There was a tubular 80 % stenosis. The lesion  was associated with a small filling defect consistent with thrombus.  RCA:   --  Mid RCA: There was a 100 % stenosis with the distal vessel being  filled via collaterals from the LAD.  GRAFTS:   --  Graft to the LAD: The graft was a LIMA. Graft angiography  showed minor luminal irregularities. Distal vessel angiography showed  minor luminalirregularities.  COMPLICATIONS: There were no complications.  DIAGNOSTIC RECOMMENDATIONS: Coronary angiogram demonstrates a severe  stenosis in the ramus intermedius that is the cause of the patient's  clinical presentation. Will therefore perform PCI to the RI.  INTERVENTIONAL RECOMMENDATIONS: S/p successful CORNELIA to the Ramus  Intermedius. The patient should continue with ASA and Brilinta for at  least 12 months.  Prepared and signed by  Corie Ga M.D.  Signed 12/06/2017 17:06:30    < from: CT Chest No Cont (01.20.18 @ 09:55) >  IMPRESSION:   Mild pulmonary edema with small simple bilateral pleural effusions, right   greater than left, with no evidence of loculation.    < end of copied text >    < from: TTE with Doppler (w/Cont) (01.23.18 @ 12:31) >  CONCLUSIONS:  1. Mitral annular calcification, otherwise normal mitral  valve. Mild-moderate mitral regurgitation.  2. Calcified trileaflet aortic valve with normal opening.  Mild aortic regurgitation.  3. Normal left ventricular internal dimensions and wall  thicknesses.  4. Endocardium not well visualized; grossly severe global  left ventricular systolic dysfunction.  Endocardial  visualization enhanced with intravenous injection of echo  contrast (Definity).  5. The right ventricle is not well visualized; grossly  normal right ventricular systolic function.  *** Compared with echocardiogram of 11/25/2017, no  significant changes noted.  ------------------------------------------------------------------------  Confirmed on  1/23/2018 - 14:35:49 by Jose Lucia M.D.    < end of copied text >    < from: Nuclear Stress Test-Pharmacologic (01.28.18 @ 12:31) >  IMPRESSIONS:Abnormal Study  * Myocardial Perfusion SPECT results are abnormal.  * There is a medium sized, mild to moderate defect in  anterior wall that is reversible, suggestive of  ischemia.There are large, moderate to severe defects in  inferolateral, lateral walls that are fixed, suggestive of  infarct.  * Post-stress gated wall motion analysis was performed  (LVEF = 33 %;LVEDV = 116 ml.), revealing severe overall  hypokinesis. There was focal inferiolateral akinesis and  severe lateral hypokinesis with absence of systolic  thickening. The best motion was in the septum.  *** Compared with Nuclear/Stress test of 11/5/2014, the  observed defect are now more extensive, suggesting  progression of disease. Prior LVEF was 39% with EDV 77 mL.  ------------------------------------------------------------------------  Revised on  1/29/2018 - 16:44:19 by Lorenzo Covarrubias M.D.  Confirmed on  1/30/2018 - 15:45:44 by José Hansen M.D.  < end of copied text >    < from: CT Chest No Cont (01.31.18 @ 17:59) >  IMPRESSION:  Resolution of the previously noted bilateral pleural effusions and   groundglass opacities within both lungs when compared to previous exam.    < end of copied text >    ASSESSMENT/PLAN: 	72y Female w/ PMH HTN, CKD, Hyperlipidemia, DM-II, CAD s/p 3V CABG  (LIMA to LAD, SVG to OM, SVG to PDA) at [facility] 2014, s/p CORNELIA TO RI 11/17/17, NSTEMI 12/2017 s/p C on 12/5 and CORNELIA to [place], TTE at that time revealed grossly moderate to severe LV dysfunction with hypokinesis of the inferior, lateral and inferolateral with an EF of 36% admitted with acute on chronic systolic CHF, s/p IV diureses, s/p NST as above, now s/p fever 1/30/18--RVP negative     --Cont PO Lasix, patient is Euvolemic  --mildly elevated trops noted in setting of CKD & CHF exacerbation   --Repeat TTE unchanged from prior. EF 32%  --Cont. DAPT for recent CORNELIA  --+FLU , now on tamiflu till 2/5   --NST noted above plan for CATH when flu resolves & pt medically optimized  family and patient aware/ agree with plan

## 2018-02-03 NOTE — PROGRESS NOTE ADULT - ASSESSMENT
71 Female, with a PmHx of HTN, CKD, Hyperlipidemia, DM-II, CAD s/p CABG x 3, s/p stents, presenting to the [facility] ED c/o shortness of breath. Pt is being admitted to telemetry for acute on chronic systolic heart failure.  Now she is with new fever, as well as URI

## 2018-02-03 NOTE — PROGRESS NOTE ADULT - SUBJECTIVE AND OBJECTIVE BOX
Patient is a 72y old  Female who presents with a chief complaint of SOB (25 Jan 2018 15:14)    Pertinent ROS: doing ok. OOB to chair. family at bedside. c/o cough when Pt tries to sleep. on Room air      MEDICATIONS  (STANDING):  aspirin enteric coated 81 milliGRAM(s) Oral daily  atorvastatin 40 milliGRAM(s) Oral at bedtime  dextrose 5%. 1000 milliLiter(s) (50 mL/Hr) IV Continuous <Continuous>  dextrose 50% Injectable 12.5 Gram(s) IV Push once  dextrose 50% Injectable 25 Gram(s) IV Push once  dextrose 50% Injectable 25 Gram(s) IV Push once  famotidine    Tablet 20 milliGRAM(s) Oral daily  furosemide    Tablet 40 milliGRAM(s) Oral daily  heparin  Injectable 5000 Unit(s) SubCutaneous every 12 hours  insulin glargine Injectable (LANTUS) 20 Unit(s) SubCutaneous every morning  insulin glargine Injectable (LANTUS) 68 Unit(s) SubCutaneous at bedtime  insulin lispro (HumaLOG) corrective regimen sliding scale   SubCutaneous three times a day before meals  insulin lispro (HumaLOG) corrective regimen sliding scale   SubCutaneous at bedtime  insulin lispro Injectable (HumaLOG) 24 Unit(s) SubCutaneous three times a day before meals  levothyroxine 50 MICROGram(s) Oral daily  metoprolol succinate ER 12.5 milliGRAM(s) Oral daily  montelukast 10 milliGRAM(s) Oral at bedtime  oseltamivir 30 milliGRAM(s) Oral two times a day  sodium bicarbonate 650 milliGRAM(s) Oral three times a day  sodium chloride 0.9% lock flush 3 milliLiter(s) IV Push every 8 hours  ticagrelor 90 milliGRAM(s) Oral two times a day    MEDICATIONS  (PRN):  acetaminophen   Tablet 650 milliGRAM(s) Oral every 6 hours PRN For Temp greater than 38 C (100.4 F)  acetaminophen   Tablet. 650 milliGRAM(s) Oral every 6 hours PRN Mild, moderate and severe pain  benzocaine 15 mG/menthol 3.6 mG Lozenge 1 Lozenge Oral every 6 hours PRN Sore Throat  dextrose Gel 1 Dose(s) Oral once PRN Blood Glucose LESS THAN 70 milliGRAM(s)/deciliter  glucagon  Injectable 1 milliGRAM(s) IntraMuscular once PRN Glucose LESS THAN 70 milligrams/deciliter  guaiFENesin    Syrup 100 milliGRAM(s) Oral every 6 hours PRN Cough  sodium chloride 0.65% Nasal 1 Spray(s) Both Nostrils three times a day PRN Nasal Congestion    Vital Signs Last 24 Hrs  T(C): 36.5 (03 Feb 2018 13:56), Max: 36.9 (02 Feb 2018 21:25)  T(F): 97.7 (03 Feb 2018 13:56), Max: 98.4 (02 Feb 2018 21:25)  HR: 84 (03 Feb 2018 13:56) (81 - 90)  BP: 113/61 (03 Feb 2018 13:56) (106/61 - 118/76)  BP(mean): --  RR: 18 (03 Feb 2018 13:56) (16 - 18)  SpO2: 100% (03 Feb 2018 13:56) (100% - 100%)    I&O's Summary    02 Feb 2018 07:01  -  03 Feb 2018 07:00  --------------------------------------------------------  IN: 710 mL / OUT: 0 mL / NET: 710 mL        Physical Exam:   GENERAL: NAD, well-groomed, well-developed  HEENT: MOHSEN, Atraumatic, Normocephalic  NECK: Supple, No JVD, Normal thyroid  CHEST/LUNG: Clear to ausculation   CVS: Regular rate and rhythm; No murmurs, rubs, or gallops  GI: : Soft, Nontender, Nondistended; Bowel sounds present  NERVOUS SYSTEM:  Alert & Oriented X3  EXTREMITIES:  2+ Peripheral Pulses, No clubbing, cyanosis, or edema  SKIN: Normal color, normal turgor, dry  ENDOCRINOLOGY: No Thyromegaly  PSYCH: Appropriate  Labs:                     10.0   8.05  )-----------( 298      ( 03 Feb 2018 07:40 )             32.3     02-03    140  |  101  |  31<H>  ----------------------------<  220<H>  3.7   |  23  |  1.52<H>    Ca    8.7      03 Feb 2018 07:40  Mg     1.8     02-03      CAPILLARY BLOOD GLUCOSE      POCT Blood Glucose.: 170 mg/dL (03 Feb 2018 13:06)  POCT Blood Glucose.: 252 mg/dL (03 Feb 2018 08:54)  POCT Blood Glucose.: 182 mg/dL (02 Feb 2018 22:02)  POCT Blood Glucose.: 238 mg/dL (02 Feb 2018 17:14)          D DImer  Cultures:           Wound culture:                01-30 @ 15:33  Organism --  Culture w/ gram stain --  Specimen Source BLOOD VENOUS      Abscess culture:             01-30 @ 15:33  Organism --  Gram Stain --  Specimen Source BLOOD VENOUS        Tissue culture:           01-30 @ 15:33  Organism --  Gram Stain --  Specimen Source BLOOD VENOUS      Body Fluid Smear & Culture:                        01-30 @ 15:33  AFB Smear  --  Culture Acid Fast Body Fluid w/ Smear  --  Culture Acid Fast Smear Concentrated   --    Culture Results:     --  Specimen Source BLOOD VENOUS        Rapid Respiratory Viral Panel Result        01-31 @ 20:56  Rapid RVP --  Coronovirus --  Adenovirus NOT DETECTED  Bordetella Pertussis NOT DETECTED  Chlamydia Pneumonia NOT DETECTED  Entero/RhinovirusNOT DETECTED  HKU1 Coronovirus --  HMPV Coronovirus NOT DETECTED  Influenza A --  Influenza AH1 NOT DETECTED  Influenza AH1 2009 NOT DETECTED  Influenza AH3 POSITIVE  Influenza B NOT DETECTED  Mycoplasma Pneumoniae NOT DETECTED This nucleic acid amplification assay was performed using  the [employer]. The following pathogens are tested  for: Adenovirus, Coronavirus 229E, Coronavirus HKU1,  Coronavirus NL63, Coronavirus OC43, Human Metapneumovirus  (HMPV), Rhinovirus/Enterovirus, Influenza A H1, Influenza A  H1 2009 (Pandemic H1 2009), Influenza A H3, Influenza A (Flu  A) subtype not identified, Influenza B, Parainfluenza Virus  (types 1, 2, 3, 4), Respiratory Syncytial Virus (RSV),  Bordetella pertussis, Chlamydophila pneumoniae, and  Mycoplasma pneumoniae. A negative FilmArray result does not  always exclude the possibility of viral or bacterial  infection. Laboratory results should always be interpreted  in the context of clinical findings.  NL63 Coronovirus --  OC43 Coronovirus --  Parainfluenza 1 NOT DETECTED  Parainfluenza 2 NOT DETECTED  Parainfluenza 3 NOT DETECTED  Parainfluenza 4 NOT DETECTED  Resp Syncytial Virus NOT DETECTED    Rapid Respiratory Viral Panel Result        01-30 @ 14:15  Rapid RVP --  Coronovirus --  Adenovirus NOT DETECTED  Bordetella Pertussis NOT DETECTED  Chlamydia Pneumonia NOT DETECTED  Entero/RhinovirusNOT DETECTED  HKU1 Coronovirus --  HMPV Coronovirus NOT DETECTED  Influenza A NOT DETECTED (any subtype)  Influenza AH1 NOT DETECTED  Influenza AH1 2009 NOT DETECTED  Influenza AH3 NOT DETECTED  Influenza B NOT DETECTED  Mycoplasma Pneumoniae NOT DETECTED This nucleic acid amplification assay was performed using  the [employer]. The following pathogens are tested  for: Adenovirus, Coronavirus 229E, Coronavirus HKU1,  Coronavirus NL63, Coronavirus OC43, Human Metapneumovirus  (HMPV), Rhinovirus/Enterovirus, Influenza A H1, Influenza A  H1 2009 (Pandemic H1 2009), Influenza A H3, Influenza A (Flu  A) subtype not identified, Influenza B, Parainfluenza Virus  (types 1, 2, 3, 4), Respiratory Syncytial Virus (RSV),  Bordetella pertussis, Chlamydophila pneumoniae, and  Mycoplasma pneumoniae. A negative FilmArray result does not  always exclude the possibility of viral or bacterial  infection. Laboratory results should always be interpreted  in the context of clinical findings.  NL63 Coronovirus --  OC43 Coronovirus --  Parainfluenza 1 NOT DETECTED  Parainfluenza 2 NOT DETECTED  Parainfluenza 3 NOT DETECTED  Parainfluenza 4 NOT DETECTED  Resp Syncytial Virus NOT DETECTED        Studies  Chest X-RAY < from: Xray Chest 1 View- PORTABLE-Routine (02.01.18 @ 08:05) >    EXAM:  XR CHEST PORTABLE ROUTINE 1V        PROCEDURE DATE:  Feb 1 2018         INTERPRETATION:  TIME OF EXAM: February 1, 2018 at 7:51 AM.    CLINICAL INFORMATION: Cough.    COMPARISON: AP chest x-ray from January 30, 2018. CT scan of the chest   from January 31, 2018.    TECHNIQUE:   AP Portable chest x-ray.    INTERPRETATION:     Heart size and the mediastinum cannot be accurately evaluated on this   projection. Coronary artery calcification and/or stent is seen.  Median sternotomy sutures and surgical clips are again seen.  There is minimal linear atelectasis versus scar in the left lower lung.   The lungs are otherwise clear.  No pleural effusion is noted.  The visualized bony structures appear intact.            IMPRESSION:  Minimal linear atelectasis versus scar in the left lower   lung. Otherwise clear lungs.   Please see report of the CT scan of the chest for any further detail.    < end of copied text >    CT SCAN Chest < from: CT Chest No Cont (01.31.18 @ 17:59) >    EXAM:  CT CHEST        PROCEDURE DATE:  Jan 31 2018         INTERPRETATION:  CLINICAL INFORMATION: Fever, shortness of breath   evaluate for pneumonia..    TECHNIQUE: CT scan of the chest was obtained without intravenous   contrast. Coronal and Sagittal reconstructions were performed.  Maximum   Intensity Projection was generated.    COMPARISON: CT chest from January 20, 2018.    FINDINGS:    AIRWAYS, LUNGS AND PLEURA: Patent central airways. Previously noted   groundglass opacities within bothlungs are no longer present. Previously   noted bilateral pleural effusions have resolved.  There is no pneumothorax.  VESSELS: Thoracic aorta is normal in caliber. The aorta and coronary   arteries are calcified.  HEART: Cardiomegaly. There is no pericardial effusion.   MEDIASTINUM: No significant mediastinal or hilar adenopathy is seen.   NECK AND CHEST WALL: The visualized thyroid is homogeneous. There is no   significant supraclavicular or axillary lymphadenopathy.  UPPER ABDOMEN: The visualized upper abdomen is unremarkable.  BONES: Status post median sternotomy.    IMPRESSION:  Resolution of the previously noted bilateral pleural effusions and   groundglass opacities within both lungs when compared to previous exam.      < end of copied text >    CT Abdomen  Venous Dopplers: LE:   Others  < from: TTE with Doppler (w/Cont) (01.23.18 @ 12:31) >    Patient name: SELVIN CLAIRE  YOB: 1945   Age: 72 (F)   MR#: [id number]  Study Date: 1/23/2018  Location: [place] [address] Sonographer: Aretha Alcazar  Study quality: Technically Difficult  Referring Physician: Vargas Adame MD  Blood Pressure: 112/70 mmHg  Height: 147 cm  Weight: 59 kg  BSA: 1.5 m2  ------------------------------------------------------------------------  PROCEDURE: Transthoracic echocardiogram with 2-D, M-Mode  and complete spectral and color flow Doppler.  Verbal consent was obtained for injection of echo contrast.  Following  intravenous injection of contrast, harmonic  imaging was performed.  INDICATION: Heart failure, unspecified (I50.9)  ------------------------------------------------------------------------  DIMENSIONS:  Dimensions:     Normal Values:  LA:     3.5 cm    2.0 - 4.0 cm  Ao:     3.0 cm    2.0 - 3.8 cm  SEPTUM: 0.8 cm    0.6 - 1.2 cm  PWT:    0.8 cm    0.6 - 1.1 cm  LVIDd:  5.2 cm    3.0 - 5.6 cm  LVIDs:  4.4 cm    1.8 - 4.0 cm  Derived Variables:  LVMI: 96 g/m2  RWT: 0.30  Fractional short: 15 %  Ejection Fraction (Teicholtz): 32 %  ------------------------------------------------------------------------  OBSERVATIONS:  Mitral Valve: Mitral annular calcification, otherwise  normal mitral valve. Mild-moderate mitral regurgitation.  Aortic Root: Normal aortic root.  Aortic Valve: Calcified trileaflet aortic valve with normal  opening. Mild aortic regurgitation.  Left Atrium: Normal left atrium.  Left Ventricle: Endocardium not well visualized; grossly  severe global left ventricular systolic dysfunction.  Endocardial visualization enhanced with intravenous  injection of echo contrast (Definity). Normal left  ventricular internal dimensions and wall thicknesses.  Right Heart: Normal right atrium. The right ventricle is  not well visualized; grossly normal right ventricular  systolic function. Normal tricuspid valve. Minimal  tricuspid regurgitation. Normal pulmonic valve. Mild  pulmonic regurgitation.  Pericardium/PleuraNormal pericardium with no pericardial  effusion.  ------------------------------------------------------------------------  CONCLUSIONS:  1. Mitral annular calcification, otherwise normal mitral  valve. Mild-moderate mitral regurgitation.  2. Calcified trileaflet aortic valve with normal opening.  Mild aortic regurgitation.  3. Normal left ventricular internal dimensions and wall  thicknesses.  4. Endocardium not well visualized; grossly severe global  left ventricular systolic dysfunction.  Endocardial  visualization enhanced with intravenous injection of echo  contrast (Definity).  5. The right ventricle is not well visualized; grossly  normal right ventricular systolic function.  *** Compared with echocardiogram of 11/25/2017, no  significant changes noted.  --------------------------------------    < end of copied text >

## 2018-02-03 NOTE — PROGRESS NOTE ADULT - SUBJECTIVE AND OBJECTIVE BOX
Chief complaint  Patient is a 72y old  Female who presents with a chief complaint of SOB (25 Jan 2018 15:14)   Review of systems  Patient in bed, looks comfortable, no fever, no hypoglycemia.    Labs and Fingersticks  CAPILLARY BLOOD GLUCOSE      POCT Blood Glucose.: 162 mg/dL (03 Feb 2018 17:13)  POCT Blood Glucose.: 170 mg/dL (03 Feb 2018 13:06)  POCT Blood Glucose.: 252 mg/dL (03 Feb 2018 08:54)  POCT Blood Glucose.: 182 mg/dL (02 Feb 2018 22:02)          Calcium, Total Serum: 8.7 (02-03 @ 07:40)  Calcium, Total Serum: 8.5 (02-02 @ 04:00)          02-03    140  |  101  |  31<H>  ----------------------------<  220<H>  3.7   |  23  |  1.52<H>    Ca    8.7      03 Feb 2018 07:40  Mg     1.8     02-03                          10.0   8.05  )-----------( 298      ( 03 Feb 2018 07:40 )             32.3     Medications  MEDICATIONS  (STANDING):  aspirin enteric coated 81 milliGRAM(s) Oral daily  atorvastatin 40 milliGRAM(s) Oral at bedtime  dextrose 5%. 1000 milliLiter(s) (50 mL/Hr) IV Continuous <Continuous>  dextrose 50% Injectable 12.5 Gram(s) IV Push once  dextrose 50% Injectable 25 Gram(s) IV Push once  dextrose 50% Injectable 25 Gram(s) IV Push once  famotidine    Tablet 20 milliGRAM(s) Oral daily  furosemide    Tablet 40 milliGRAM(s) Oral daily  heparin  Injectable 5000 Unit(s) SubCutaneous every 12 hours  insulin glargine Injectable (LANTUS) 20 Unit(s) SubCutaneous every morning  insulin glargine Injectable (LANTUS) 68 Unit(s) SubCutaneous at bedtime  insulin lispro (HumaLOG) corrective regimen sliding scale   SubCutaneous three times a day before meals  insulin lispro (HumaLOG) corrective regimen sliding scale   SubCutaneous at bedtime  insulin lispro Injectable (HumaLOG) 24 Unit(s) SubCutaneous three times a day before meals  levothyroxine 50 MICROGram(s) Oral daily  metoprolol succinate ER 12.5 milliGRAM(s) Oral daily  montelukast 10 milliGRAM(s) Oral at bedtime  oseltamivir 30 milliGRAM(s) Oral two times a day  sodium bicarbonate 650 milliGRAM(s) Oral three times a day  sodium chloride 0.9% lock flush 3 milliLiter(s) IV Push every 8 hours  ticagrelor 90 milliGRAM(s) Oral two times a day      Physical Exam  General: Patient comfortable in bed  Vital Signs Last 12 Hrs  T(F): 97.7 (02-03-18 @ 13:56), Max: 97.7 (02-03-18 @ 13:56)  HR: 84 (02-03-18 @ 13:56) (84 - 84)  BP: 113/61 (02-03-18 @ 13:56) (113/61 - 113/61)  BP(mean): --  RR: 18 (02-03-18 @ 13:56) (18 - 18)  SpO2: 100% (02-03-18 @ 13:56) (100% - 100%)  Neck: No palpable thyroid nodules.  CVS: S1S2, No murmurs  Respiratory: No wheezing, no crepitations  GI: Abdomen soft, bowel sounds positive  Musculoskeletal:  edema lower extremities.   Skin: No skin rashes, no ecchymosis    Diagnostics

## 2018-02-03 NOTE — PROGRESS NOTE ADULT - SUBJECTIVE AND OBJECTIVE BOX
Dr. Muhammad (Nephrology)  Office [phone]  Cell [phone]  [facility]  Cell [phone]      Patient is a 72y old  Female who presents with a chief complaint of SOB (25 Jan 2018 15:14)      Patient seen and examined at bedside. No chest pain/sob    VITALS:  T(F): 97.7 (02-03-18 @ 13:56), Max: 98.4 (02-02-18 @ 21:25)  HR: 84 (02-03-18 @ 13:56)  BP: 113/61 (02-03-18 @ 13:56)  RR: 18 (02-03-18 @ 13:56)  SpO2: 100% (02-03-18 @ 13:56)  Wt(kg): --    02-02 @ 07:01  -  02-03 @ 07:00  --------------------------------------------------------  IN: 710 mL / OUT: 0 mL / NET: 710 mL          PHYSICAL EXAM:  Constitutional: NAD  Neck: No JVD  Respiratory: CTAB, no wheezes, rales or rhonchi  Cardiovascular: S1, S2, RRR  Gastrointestinal: BS+, soft, NT/ND  Extremities: No peripheral edema    Hospital Medications:   MEDICATIONS  (STANDING):  aspirin enteric coated 81 milliGRAM(s) Oral daily  atorvastatin 40 milliGRAM(s) Oral at bedtime  dextrose 5%. 1000 milliLiter(s) (50 mL/Hr) IV Continuous <Continuous>  dextrose 50% Injectable 12.5 Gram(s) IV Push once  dextrose 50% Injectable 25 Gram(s) IV Push once  dextrose 50% Injectable 25 Gram(s) IV Push once  famotidine    Tablet 20 milliGRAM(s) Oral daily  furosemide    Tablet 40 milliGRAM(s) Oral daily  heparin  Injectable 5000 Unit(s) SubCutaneous every 12 hours  insulin glargine Injectable (LANTUS) 20 Unit(s) SubCutaneous every morning  insulin glargine Injectable (LANTUS) 68 Unit(s) SubCutaneous at bedtime  insulin lispro (HumaLOG) corrective regimen sliding scale   SubCutaneous three times a day before meals  insulin lispro (HumaLOG) corrective regimen sliding scale   SubCutaneous at bedtime  insulin lispro Injectable (HumaLOG) 24 Unit(s) SubCutaneous three times a day before meals  levothyroxine 50 MICROGram(s) Oral daily  metoprolol succinate ER 12.5 milliGRAM(s) Oral daily  montelukast 10 milliGRAM(s) Oral at bedtime  oseltamivir 30 milliGRAM(s) Oral two times a day  sodium bicarbonate 650 milliGRAM(s) Oral three times a day  sodium chloride 0.9% lock flush 3 milliLiter(s) IV Push every 8 hours  ticagrelor 90 milliGRAM(s) Oral two times a day      LABS:  02-03    140  |  101  |  31<H>  ----------------------------<  220<H>  3.7   |  23  |  1.52<H>    Ca    8.7      03 Feb 2018 07:40  Mg     1.8     02-03      Creatinine Trend: 1.52 <--, 1.70 <--, 1.61 <--, 1.56 <--, 1.53 <--, 1.49 <--, 1.58 <--                                10.0   8.05  )-----------( 298      ( 03 Feb 2018 07:40 )             32.3     Urine Studies:  Urinalysis - [01-30-18 @ 15:20]      Color PLYEL / Appearance CLEAR / SG 1.018 / pH 6.5      Gluc >1000 / Ketone NEGATIVE  / Bili NEGATIVE / Urobili NORMAL       Blood TRACE / Protein 150 / Leuk Est NEGATIVE / Nitrite NEGATIVE      RBC 0-2 / WBC 0-2 / Hyaline  / Gran  / Sq Epi OCC / Non Sq Epi  / Bacteria       Iron 40, TIBC 377, %sat --      [01-20-18 @ 06:06]  Ferritin 38.35      [01-20-18 @ 06:06]  HbA1c 9.1      [11-25-17 @ 06:30]  TSH 0.55      [01-19-18 @ 06:45]  Lipid: chol 130, , HDL 30, LDL 70      [01-19-18 @ 06:45]        RADIOLOGY & ADDITIONAL STUDIES:

## 2018-02-04 LAB
BACTERIA BLD CULT: SIGNIFICANT CHANGE UP
BACTERIA BLD CULT: SIGNIFICANT CHANGE UP
BASOPHILS # BLD AUTO: 0.02 K/UL — SIGNIFICANT CHANGE UP (ref 0–0.2)
BASOPHILS NFR BLD AUTO: 0.3 % — SIGNIFICANT CHANGE UP (ref 0–2)
BUN SERPL-MCNC: 30 MG/DL — HIGH (ref 7–23)
CALCIUM SERPL-MCNC: 8.9 MG/DL — SIGNIFICANT CHANGE UP (ref 8.4–10.5)
CHLORIDE SERPL-SCNC: 100 MMOL/L — SIGNIFICANT CHANGE UP (ref 98–107)
CO2 SERPL-SCNC: 24 MMOL/L — SIGNIFICANT CHANGE UP (ref 22–31)
CREAT SERPL-MCNC: 1.64 MG/DL — HIGH (ref 0.5–1.3)
EOSINOPHIL # BLD AUTO: 0.61 K/UL — HIGH (ref 0–0.5)
EOSINOPHIL NFR BLD AUTO: 7.6 % — HIGH (ref 0–6)
GLUCOSE BLDC GLUCOMTR-MCNC: 148 MG/DL — HIGH (ref 70–99)
GLUCOSE BLDC GLUCOMTR-MCNC: 211 MG/DL — HIGH (ref 70–99)
GLUCOSE BLDC GLUCOMTR-MCNC: 214 MG/DL — HIGH (ref 70–99)
GLUCOSE BLDC GLUCOMTR-MCNC: 309 MG/DL — HIGH (ref 70–99)
GLUCOSE SERPL-MCNC: 255 MG/DL — HIGH (ref 70–99)
HCT VFR BLD CALC: 31 % — LOW (ref 34.5–45)
HGB BLD-MCNC: 9.8 G/DL — LOW (ref 11.5–15.5)
IMM GRANULOCYTES # BLD AUTO: 0.06 # — SIGNIFICANT CHANGE UP
IMM GRANULOCYTES NFR BLD AUTO: 0.8 % — SIGNIFICANT CHANGE UP (ref 0–1.5)
LYMPHOCYTES # BLD AUTO: 2.82 K/UL — SIGNIFICANT CHANGE UP (ref 1–3.3)
LYMPHOCYTES # BLD AUTO: 35.3 % — SIGNIFICANT CHANGE UP (ref 13–44)
MAGNESIUM SERPL-MCNC: 1.8 MG/DL — SIGNIFICANT CHANGE UP (ref 1.6–2.6)
MCHC RBC-ENTMCNC: 26.6 PG — LOW (ref 27–34)
MCHC RBC-ENTMCNC: 31.6 % — LOW (ref 32–36)
MCV RBC AUTO: 84 FL — SIGNIFICANT CHANGE UP (ref 80–100)
MONOCYTES # BLD AUTO: 0.6 K/UL — SIGNIFICANT CHANGE UP (ref 0–0.9)
MONOCYTES NFR BLD AUTO: 7.5 % — SIGNIFICANT CHANGE UP (ref 2–14)
NEUTROPHILS # BLD AUTO: 3.89 K/UL — SIGNIFICANT CHANGE UP (ref 1.8–7.4)
NEUTROPHILS NFR BLD AUTO: 48.5 % — SIGNIFICANT CHANGE UP (ref 43–77)
NRBC # FLD: 0 — SIGNIFICANT CHANGE UP
PLATELET # BLD AUTO: 286 K/UL — SIGNIFICANT CHANGE UP (ref 150–400)
PMV BLD: 9.3 FL — SIGNIFICANT CHANGE UP (ref 7–13)
POTASSIUM SERPL-MCNC: 3.8 MMOL/L — SIGNIFICANT CHANGE UP (ref 3.5–5.3)
POTASSIUM SERPL-SCNC: 3.8 MMOL/L — SIGNIFICANT CHANGE UP (ref 3.5–5.3)
RBC # BLD: 3.69 M/UL — LOW (ref 3.8–5.2)
RBC # FLD: 15.3 % — HIGH (ref 10.3–14.5)
SODIUM SERPL-SCNC: 139 MMOL/L — SIGNIFICANT CHANGE UP (ref 135–145)
WBC # BLD: 8 K/UL — SIGNIFICANT CHANGE UP (ref 3.8–10.5)
WBC # FLD AUTO: 8 K/UL — SIGNIFICANT CHANGE UP (ref 3.8–10.5)

## 2018-02-04 RX ORDER — INSULIN GLARGINE 100 [IU]/ML
48 INJECTION, SOLUTION SUBCUTANEOUS ONCE
Qty: 0 | Refills: 0 | Status: COMPLETED | OUTPATIENT
Start: 2018-02-04 | End: 2018-02-04

## 2018-02-04 RX ADMIN — Medication 30 MILLIGRAM(S): at 05:18

## 2018-02-04 RX ADMIN — Medication 24 UNIT(S): at 13:27

## 2018-02-04 RX ADMIN — HEPARIN SODIUM 5000 UNIT(S): 5000 INJECTION INTRAVENOUS; SUBCUTANEOUS at 17:21

## 2018-02-04 RX ADMIN — ATORVASTATIN CALCIUM 40 MILLIGRAM(S): 80 TABLET, FILM COATED ORAL at 21:27

## 2018-02-04 RX ADMIN — INSULIN GLARGINE 48 UNIT(S): 100 INJECTION, SOLUTION SUBCUTANEOUS at 23:28

## 2018-02-04 RX ADMIN — SODIUM CHLORIDE 3 MILLILITER(S): 9 INJECTION INTRAMUSCULAR; INTRAVENOUS; SUBCUTANEOUS at 05:18

## 2018-02-04 RX ADMIN — Medication 40 MILLIGRAM(S): at 05:18

## 2018-02-04 RX ADMIN — Medication 30 MILLIGRAM(S): at 17:21

## 2018-02-04 RX ADMIN — Medication 4: at 09:31

## 2018-02-04 RX ADMIN — Medication 24 UNIT(S): at 09:31

## 2018-02-04 RX ADMIN — Medication 81 MILLIGRAM(S): at 13:27

## 2018-02-04 RX ADMIN — INSULIN GLARGINE 20 UNIT(S): 100 INJECTION, SOLUTION SUBCUTANEOUS at 09:30

## 2018-02-04 RX ADMIN — Medication 8: at 18:16

## 2018-02-04 RX ADMIN — Medication 650 MILLIGRAM(S): at 13:27

## 2018-02-04 RX ADMIN — SODIUM CHLORIDE 3 MILLILITER(S): 9 INJECTION INTRAMUSCULAR; INTRAVENOUS; SUBCUTANEOUS at 21:27

## 2018-02-04 RX ADMIN — TICAGRELOR 90 MILLIGRAM(S): 90 TABLET ORAL at 17:21

## 2018-02-04 RX ADMIN — Medication 650 MILLIGRAM(S): at 21:27

## 2018-02-04 RX ADMIN — FAMOTIDINE 20 MILLIGRAM(S): 10 INJECTION INTRAVENOUS at 13:27

## 2018-02-04 RX ADMIN — SODIUM CHLORIDE 3 MILLILITER(S): 9 INJECTION INTRAMUSCULAR; INTRAVENOUS; SUBCUTANEOUS at 13:26

## 2018-02-04 RX ADMIN — Medication 650 MILLIGRAM(S): at 05:18

## 2018-02-04 RX ADMIN — Medication 12.5 MILLIGRAM(S): at 05:18

## 2018-02-04 RX ADMIN — Medication 24 UNIT(S): at 18:17

## 2018-02-04 RX ADMIN — Medication 50 MICROGRAM(S): at 05:18

## 2018-02-04 RX ADMIN — HEPARIN SODIUM 5000 UNIT(S): 5000 INJECTION INTRAVENOUS; SUBCUTANEOUS at 05:18

## 2018-02-04 RX ADMIN — MONTELUKAST 10 MILLIGRAM(S): 4 TABLET, CHEWABLE ORAL at 21:27

## 2018-02-04 RX ADMIN — TICAGRELOR 90 MILLIGRAM(S): 90 TABLET ORAL at 05:18

## 2018-02-04 NOTE — PROGRESS NOTE ADULT - SUBJECTIVE AND OBJECTIVE BOX
Subjective:  no CP or SOB, c/o nasal congestion & cough improving , now +FLU      MEDICATIONS  (STANDING):  aspirin enteric coated 81 milliGRAM(s) Oral daily  atorvastatin 40 milliGRAM(s) Oral at bedtime  dextrose 5%. 1000 milliLiter(s) (50 mL/Hr) IV Continuous <Continuous>  dextrose 50% Injectable 12.5 Gram(s) IV Push once  dextrose 50% Injectable 25 Gram(s) IV Push once  dextrose 50% Injectable 25 Gram(s) IV Push once  famotidine    Tablet 20 milliGRAM(s) Oral daily  furosemide    Tablet 40 milliGRAM(s) Oral daily  heparin  Injectable 5000 Unit(s) SubCutaneous every 12 hours  insulin glargine Injectable (LANTUS) 20 Unit(s) SubCutaneous every morning  insulin glargine Injectable (LANTUS) 68 Unit(s) SubCutaneous at bedtime  insulin lispro (HumaLOG) corrective regimen sliding scale   SubCutaneous three times a day before meals  insulin lispro (HumaLOG) corrective regimen sliding scale   SubCutaneous at bedtime  insulin lispro Injectable (HumaLOG) 24 Unit(s) SubCutaneous three times a day before meals  levothyroxine 50 MICROGram(s) Oral daily  metoprolol succinate ER 12.5 milliGRAM(s) Oral daily  montelukast 10 milliGRAM(s) Oral at bedtime  oseltamivir 30 milliGRAM(s) Oral two times a day  sodium bicarbonate 650 milliGRAM(s) Oral three times a day  sodium chloride 0.9% lock flush 3 milliLiter(s) IV Push every 8 hours  ticagrelor 90 milliGRAM(s) Oral two times a day    MEDICATIONS  (PRN):  acetaminophen   Tablet 650 milliGRAM(s) Oral every 6 hours PRN For Temp greater than 38 C (100.4 F)  acetaminophen   Tablet. 650 milliGRAM(s) Oral every 6 hours PRN Mild, moderate and severe pain  benzocaine 15 mG/menthol 3.6 mG Lozenge 1 Lozenge Oral every 6 hours PRN Sore Throat  dextrose Gel 1 Dose(s) Oral once PRN Blood Glucose LESS THAN 70 milliGRAM(s)/deciliter  glucagon  Injectable 1 milliGRAM(s) IntraMuscular once PRN Glucose LESS THAN 70 milligrams/deciliter  guaiFENesin    Syrup 100 milliGRAM(s) Oral every 6 hours PRN Cough  sodium chloride 0.65% Nasal 1 Spray(s) Both Nostrils three times a day PRN Nasal Congestion      LABS:                        9.8    8.00  )-----------( 286      ( 04 Feb 2018 07:10 )             31.0     Hemoglobin: 9.8 g/dL (02-04 @ 07:10)  Hemoglobin: 10.0 g/dL (02-03 @ 07:40)  Hemoglobin: 9.8 g/dL (02-02 @ 04:00)  Hemoglobin: 10.4 g/dL (02-01 @ 06:05)  Hemoglobin: 10.1 g/dL (01-31 @ 05:00)    02-04    139  |  100  |  30<H>  ----------------------------<  255<H>  3.8   |  24  |  1.64<H>    Ca    8.9      04 Feb 2018 07:10  Mg     1.8     02-04      Creatinine Trend: 1.64<--, 1.52<--, 1.70<--, 1.61<--, 1.56<--, 1.53<--           PHYSICAL EXAM  Vital Signs Last 24 Hrs  T(C): 36.7 (04 Feb 2018 05:15), Max: 36.7 (03 Feb 2018 21:55)  T(F): 98 (04 Feb 2018 05:15), Max: 98 (03 Feb 2018 21:55)  HR: 80 (04 Feb 2018 05:15) (80 - 85)  BP: 114/57 (04 Feb 2018 05:15) (113/61 - 122/58)  BP(mean): --  RR: 18 (04 Feb 2018 05:15) (18 - 18)  SpO2: 100% (04 Feb 2018 05:15) (99% - 100%)      Cardiovascular: S1S2 RRR  Respiratory: CTA BL   Gastrointestinal:  Soft, Non-tender, + BS	  Extremities No edema, cyanosis or clubbing  B/L LE's     DIAGNOSTIC DATA:     TELEMETRY: SR 	       < from: Cardiac Cath Lab - Adult (10.13.17 @ 10:40) >  VENTRICLES: No left ventriculogram was performed.  CORONARY VESSELS: The coronary circulation is right dominant.  LM:   --  LM: Normal.  LAD:   --  Proximal LAD: There was a diffuse 60 % stenosis.  --  Mid LAD: There was a 100 % stenosis with the distal vessel being filled  via LIMA-LAD.  CX:   --  Circumflex: The vessel was small sized. Angiography showed mild  atherosclerosis withno flow limiting lesions.  --  OM1: Angiography showed mild atherosclerosis with no flow limiting  lesions.  RI:   --  Ostial ramus intermedius: There was a tubular 80 % stenosis.  There was PARUL grade 3 flow through the vessel (brisk flow).  RCA:   --  Proximal RCA: Angiography showed mild atherosclerosis with no  flow limiting lesions.  --  Mid RCA: There was a 100 % stenosis with the distal vessel being filled  via collaterals. This lesion is a chronic total occlusion.  GRAFTS:   --  Graft to the LAD: The graft was a LIMA. Graft angiography  showed minor luminal irregularities. Distal vessel angiography showed  minor luminal irregularities.  AORTA: Ascending aorta: Normal. No additional grafts visualized in  aortogram.  COMPLICATIONS: There were no complications.  DIAGNOSTIC RECOMMENDATIONS: Coronary angiogram demonstrates patent  LIMA-LAD, closed SVG grafts, and a severe stenosis in the ostial Ramus.  Will perform staged PCI to RI when Cr stable and renally optimized.  Prepared and signed by  Corie Ga M.D.  Signed 10/17/2017 13:49:15    < end of copied text >    < from: Cardiac Cath Lab - Adult (11.17.17 @ 11:48) >  PROCEDURE:  --  Intervention on ramus intermedius: drug-eluting stent.    VENTRICLES: No LV gram was performed; however, a recent echocardiogram  demonstrated normal global and regional LV function.  CORONARY VESSELS: The coronary circulation is right dominant.  LM:   --  LM: Angiography showed minor luminal irregularities with no flow  limiting lesions.  LAD:   --  Ostial LAD: There was a 100 % stenosis.  CX:   --  Circumflex: This vessel was not injected, but was visualized  during a prior cardiac catheterization.  RI:   --  Ramus intermedius: There was a 95 % stenosis.  RCA:   --  RCA: This vessel was not injected.  COMPLICATIONS: There were no complications.  DIAGNOSTIC RECOMMENDATIONS: The patient should continue with the present  medications. will add plavix 75mg po once a day/ will add ECASA 325mg po  once day.  Prepared and signed by  Roberta Quick M.D.  Signed 11/17/2017 13:16:01    < end of copied text >    < from: TTE with Doppler (w/Cont) (11.25.17 @ 12:33) >  CONCLUSIONS:  1. Mitral annular calcification, otherwise normal mitral  valve. Mild mitral regurgitation.  2. Normal left ventricular internal dimensions and wall  thicknesses.  3. Endocardium not well visualized; grossly moderate to  severe segmental left ventricularsystolic dysfunction.  Hypokinesis of the inferior, lateral, and inferolateral  walls.  Endocardial visualization enhanced with intravenous  injection of echo contrast (Definity).  No LV thrombus  seen.  4. The right ventricle is not well visualized;grossly  normal right ventricular systolic function.  ------------------------------------------------------------------------  Confirmed on  11/25/2017 - 14:44:41 by Jose Lucia M.D.    < end of copied text >    < from: Cardiac Cath Lab - Adult (12.05.17 @ 12:48) >  VENTRICLES: No LV gram was performed; however, a recent echocardiogram  demonstrated an EF of 36 %.  CORONARY VESSELS: The coronary circulation is right dominant.  LM:   --  Distal left main: Angiography showed minor luminal irregularities  with no flow limiting lesions.  LAD:   --  Mid LAD: There was a 100 % stenosis with the distal vessel being  filled via LIMA-LAD.  CX:   --  Proximal circumflex: There was a tubular 20 % stenosis.  --  Mid circumflex: Angiography showed minor luminal irregularities with no  flow limiting lesions.  --  Distal circumflex: Angiography showed minor luminal irregularities with  no flow limiting lesions.  --  OM1: The vessel was small sized. There was a discrete 60 % stenosis.  RI:   --  Ostial ramus intermedius: Angiography showed minor luminal  irregularities with no flow limiting lesions. There was no significant  restenosis in RI stent.  --  Proximal ramus intermedius: Angiography showed minor luminal  irregularities with no flow limiting lesions.  --  Mid ramus intermedius: There was a tubular 80 % stenosis. The lesion  was associated with a small filling defect consistent with thrombus.  RCA:   --  Mid RCA: There was a 100 % stenosis with the distal vessel being  filled via collaterals from the LAD.  GRAFTS:   --  Graft to the LAD: The graft was a LIMA. Graft angiography  showed minor luminal irregularities. Distal vessel angiography showed  minor luminalirregularities.  COMPLICATIONS: There were no complications.  DIAGNOSTIC RECOMMENDATIONS: Coronary angiogram demonstrates a severe  stenosis in the ramus intermedius that is the cause of the patient's  clinical presentation. Will therefore perform PCI to the RI.  INTERVENTIONAL RECOMMENDATIONS: S/p successful CORNELIA to the Ramus  Intermedius. The patient should continue with ASA and Brilinta for at  least 12 months.  Prepared and signed by  Corie Ga M.D.  Signed 12/06/2017 17:06:30    < from: CT Chest No Cont (01.20.18 @ 09:55) >  IMPRESSION:   Mild pulmonary edema with small simple bilateral pleural effusions, right   greater than left, with no evidence of loculation.    < end of copied text >    < from: TTE with Doppler (w/Cont) (01.23.18 @ 12:31) >  CONCLUSIONS:  1. Mitral annular calcification, otherwise normal mitral  valve. Mild-moderate mitral regurgitation.  2. Calcified trileaflet aortic valve with normal opening.  Mild aortic regurgitation.  3. Normal left ventricular internal dimensions and wall  thicknesses.  4. Endocardium not well visualized; grossly severe global  left ventricular systolic dysfunction.  Endocardial  visualization enhanced with intravenous injection of echo  contrast (Definity).  5. The right ventricle is not well visualized; grossly  normal right ventricular systolic function.  *** Compared with echocardiogram of 11/25/2017, no  significant changes noted.  ------------------------------------------------------------------------  Confirmed on  1/23/2018 - 14:35:49 by Jose Lucia M.D.    < end of copied text >    < from: Nuclear Stress Test-Pharmacologic (01.28.18 @ 12:31) >  IMPRESSIONS:Abnormal Study  * Myocardial Perfusion SPECT results are abnormal.  * There is a medium sized, mild to moderate defect in  anterior wall that is reversible, suggestive of  ischemia.There are large, moderate to severe defects in  inferolateral, lateral walls that are fixed, suggestive of  infarct.  * Post-stress gated wall motion analysis was performed  (LVEF = 33 %;LVEDV = 116 ml.), revealing severe overall  hypokinesis. There was focal inferiolateral akinesis and  severe lateral hypokinesis with absence of systolic  thickening. The best motion was in the septum.  *** Compared with Nuclear/Stress test of 11/5/2014, the  observed defect are now more extensive, suggesting  progression of disease. Prior LVEF was 39% with EDV 77 mL.  ------------------------------------------------------------------------  Revised on  1/29/2018 - 16:44:19 by Lorenzo Covarrubias M.D.  Confirmed on  1/30/2018 - 15:45:44 by José Hansen M.D.  < end of copied text >    < from: CT Chest No Cont (01.31.18 @ 17:59) >  IMPRESSION:  Resolution of the previously noted bilateral pleural effusions and   groundglass opacities within both lungs when compared to previous exam.    < end of copied text >    ASSESSMENT/PLAN: 	72y Female w/ PMH HTN, CKD, Hyperlipidemia, DM-II, CAD s/p 3V CABG  (LIMA to LAD, SVG to OM, SVG to PDA) at [facility] 2014, s/p CORNELIA TO RI 11/17/17, NSTEMI 12/2017 s/p C on 12/5 and CORNELIA to [facility], TTE at that time revealed grossly moderate to severe LV dysfunction with hypokinesis of the inferior, lateral and inferolateral with an EF of 36% admitted with acute on chronic systolic CHF, s/p IV diureses, s/p NST as above, now s/p fever 1/30/18--RVP negative     --Cont PO Lasix, patient is Euvolemic  --mildly elevated trops noted in setting of CKD & CHF exacerbation   --Repeat TTE unchanged from prior. EF 32%  --Cont. DAPT for recent CORNELIA  --+FLU , now on tamiflu till 2/5   --NST noted above plan for CATH when flu resolves & pt medically optimized  family and patient aware/ agree with plan

## 2018-02-04 NOTE — PROGRESS NOTE ADULT - SUBJECTIVE AND OBJECTIVE BOX
Patient seen and examined at bedside. No chest pain/sob    VITALS:  T(F): 97.4 (02-04-18 @ 13:16), Max: 98 (02-03-18 @ 21:55)  HR: 84 (02-04-18 @ 13:16)  BP: 109/59 (02-04-18 @ 13:16)  RR: 17 (02-04-18 @ 13:16)  SpO2: 100% (02-04-18 @ 13:16)  Wt(kg): --    02-03 @ 07:01  -  02-04 @ 07:00  --------------------------------------------------------  IN: 480 mL / OUT: 0 mL / NET: 480 mL          PHYSICAL EXAM:  Constitutional: NAD  Neck: No JVD  Respiratory: CTAB, no wheezes, rales or rhonchi  Cardiovascular: S1, S2, RRR  Gastrointestinal: BS+, soft, NT/ND  Extremities: No peripheral edema    Hospital Medications:   MEDICATIONS  (STANDING):  aspirin enteric coated 81 milliGRAM(s) Oral daily  atorvastatin 40 milliGRAM(s) Oral at bedtime  dextrose 5%. 1000 milliLiter(s) (50 mL/Hr) IV Continuous <Continuous>  dextrose 50% Injectable 12.5 Gram(s) IV Push once  dextrose 50% Injectable 25 Gram(s) IV Push once  dextrose 50% Injectable 25 Gram(s) IV Push once  famotidine    Tablet 20 milliGRAM(s) Oral daily  furosemide    Tablet 40 milliGRAM(s) Oral daily  heparin  Injectable 5000 Unit(s) SubCutaneous every 12 hours  insulin glargine Injectable (LANTUS) 20 Unit(s) SubCutaneous every morning  insulin glargine Injectable (LANTUS) 68 Unit(s) SubCutaneous at bedtime  insulin lispro (HumaLOG) corrective regimen sliding scale   SubCutaneous three times a day before meals  insulin lispro (HumaLOG) corrective regimen sliding scale   SubCutaneous at bedtime  insulin lispro Injectable (HumaLOG) 24 Unit(s) SubCutaneous three times a day before meals  levothyroxine 50 MICROGram(s) Oral daily  metoprolol succinate ER 12.5 milliGRAM(s) Oral daily  montelukast 10 milliGRAM(s) Oral at bedtime  oseltamivir 30 milliGRAM(s) Oral two times a day  sodium bicarbonate 650 milliGRAM(s) Oral three times a day  sodium chloride 0.9% lock flush 3 milliLiter(s) IV Push every 8 hours  ticagrelor 90 milliGRAM(s) Oral two times a day      LABS:  02-04    139  |  100  |  30<H>  ----------------------------<  255<H>  3.8   |  24  |  1.64<H>    Ca    8.9      04 Feb 2018 07:10  Mg     1.8     02-04      Creatinine Trend: 1.64 <--, 1.52 <--, 1.70 <--, 1.61 <--, 1.56 <--, 1.53 <--, 1.49 <--                              9.8    8.00  )-----------( 286      ( 04 Feb 2018 07:10 )             31.0     Urine Studies:      Urinalysis - [01-30-18 @ 15:20]      Color PLYEL / Appearance CLEAR / SG 1.018 / pH 6.5      Gluc >1000 / Ketone NEGATIVE  / Bili NEGATIVE / Urobili NORMAL       Blood TRACE / Protein 150 / Leuk Est NEGATIVE / Nitrite NEGATIVE      RBC 0-2 / WBC 0-2 / Hyaline  / Gran  / Sq Epi OCC / Non Sq Epi  / Bacteria       Iron 40, TIBC 377, %sat --      [01-20-18 @ 06:06]  Ferritin 38.35      [01-20-18 @ 06:06]  HbA1c 9.1      [11-25-17 @ 06:30]  TSH 0.55      [01-19-18 @ 06:45]  Lipid: chol 130, , HDL 30, LDL 70      [01-19-18 @ 06:45]        RADIOLOGY & ADDITIONAL STUDIES:

## 2018-02-04 NOTE — PROGRESS NOTE ADULT - SUBJECTIVE AND OBJECTIVE BOX
Patient is a 72y old  Female who presents with a chief complaint of SOB (25 Jan 2018 15:14)    Pertinent ROS: OOB to chair. doing ok. on room air.     MEDICATIONS  (STANDING):  aspirin enteric coated 81 milliGRAM(s) Oral daily  atorvastatin 40 milliGRAM(s) Oral at bedtime  dextrose 5%. 1000 milliLiter(s) (50 mL/Hr) IV Continuous <Continuous>  dextrose 50% Injectable 12.5 Gram(s) IV Push once  dextrose 50% Injectable 25 Gram(s) IV Push once  dextrose 50% Injectable 25 Gram(s) IV Push once  famotidine    Tablet 20 milliGRAM(s) Oral daily  furosemide    Tablet 40 milliGRAM(s) Oral daily  heparin  Injectable 5000 Unit(s) SubCutaneous every 12 hours  insulin glargine Injectable (LANTUS) 20 Unit(s) SubCutaneous every morning  insulin glargine Injectable (LANTUS) 68 Unit(s) SubCutaneous at bedtime  insulin lispro (HumaLOG) corrective regimen sliding scale   SubCutaneous three times a day before meals  insulin lispro (HumaLOG) corrective regimen sliding scale   SubCutaneous at bedtime  insulin lispro Injectable (HumaLOG) 24 Unit(s) SubCutaneous three times a day before meals  levothyroxine 50 MICROGram(s) Oral daily  metoprolol succinate ER 12.5 milliGRAM(s) Oral daily  montelukast 10 milliGRAM(s) Oral at bedtime  oseltamivir 30 milliGRAM(s) Oral two times a day  sodium bicarbonate 650 milliGRAM(s) Oral three times a day  sodium chloride 0.9% lock flush 3 milliLiter(s) IV Push every 8 hours  ticagrelor 90 milliGRAM(s) Oral two times a day    MEDICATIONS  (PRN):  acetaminophen   Tablet 650 milliGRAM(s) Oral every 6 hours PRN For Temp greater than 38 C (100.4 F)  acetaminophen   Tablet. 650 milliGRAM(s) Oral every 6 hours PRN Mild, moderate and severe pain  benzocaine 15 mG/menthol 3.6 mG Lozenge 1 Lozenge Oral every 6 hours PRN Sore Throat  dextrose Gel 1 Dose(s) Oral once PRN Blood Glucose LESS THAN 70 milliGRAM(s)/deciliter  glucagon  Injectable 1 milliGRAM(s) IntraMuscular once PRN Glucose LESS THAN 70 milligrams/deciliter  guaiFENesin    Syrup 100 milliGRAM(s) Oral every 6 hours PRN Cough  sodium chloride 0.65% Nasal 1 Spray(s) Both Nostrils three times a day PRN Nasal Congestion    Vital Signs Last 24 Hrs  T(C): 36.3 (04 Feb 2018 13:16), Max: 36.7 (03 Feb 2018 21:55)  T(F): 97.4 (04 Feb 2018 13:16), Max: 98 (03 Feb 2018 21:55)  HR: 84 (04 Feb 2018 13:16) (80 - 85)  BP: 109/59 (04 Feb 2018 13:16) (109/59 - 122/58)  BP(mean): --  RR: 17 (04 Feb 2018 13:16) (17 - 18)  SpO2: 100% (04 Feb 2018 13:16) (99% - 100%)    I&O's Summary    03 Feb 2018 07:01  -  04 Feb 2018 07:00  GENERAL: NAD, well-groomed, well-developed  HEENT: MOHSEN, Atraumatic, Normocephalic  NECK: Supple, No JVD, Normal thyroid  CHEST/LUNG: Clear to ausculation   CVS: Regular rate and rhythm; No murmurs, rubs, or gallops  GI: : Soft, Nontender, Nondistended; Bowel sounds present  NERVOUS SYSTEM:  Alert & Oriented X3  EXTREMITIES:  2+ Peripheral Pulses, No clubbing, cyanosis, or edema  SKIN: Normal color, normal turgor, dry  ENDOCRINOLOGY: No Thyromegaly  PSYCH: Appropriate--------------------------------------------------------  IN: 480 mL / OUT: 0 mL / NET: 480 mL        Physical Exam:     Labs:                    9.8    8.00  )-----------( 286      ( 04 Feb 2018 07:10 )             31.0     02-04    139  |  100  |  30<H>  ----------------------------<  255<H>  3.8   |  24  |  1.64<H>    Ca    8.9      04 Feb 2018 07:10  Mg     1.8     02-04      CAPILLARY BLOOD GLUCOSE      POCT Blood Glucose.: 148 mg/dL (04 Feb 2018 13:07)  POCT Blood Glucose.: 214 mg/dL (04 Feb 2018 09:08)  POCT Blood Glucose.: 240 mg/dL (03 Feb 2018 21:37)  POCT Blood Glucose.: 162 mg/dL (03 Feb 2018 17:13)          D DImer  Cultures:           Wound culture:                01-30 @ 15:33  Organism --  Culture w/ gram stain --  Specimen Source BLOOD VENOUS      Abscess culture:             01-30 @ 15:33  Organism --  Gram Stain --  Specimen Source BLOOD VENOUS        Tissue culture:           01-30 @ 15:33  Organism --  Gram Stain --  Specimen Source BLOOD VENOUS      Body Fluid Smear & Culture:                        01-30 @ 15:33  AFB Smear  --  Culture Acid Fast Body Fluid w/ Smear  --  Culture Acid Fast Smear Concentrated   --    Culture Results:     --  Specimen Source BLOOD VENOUS        Rapid Respiratory Viral Panel Result        01-31 @ 20:56  Rapid RVP --  Coronovirus --  Adenovirus NOT DETECTED  Bordetella Pertussis NOT DETECTED  Chlamydia Pneumonia NOT DETECTED  Entero/RhinovirusNOT DETECTED  HKU1 Coronovirus --  HMPV Coronovirus NOT DETECTED  Influenza A --  Influenza AH1 NOT DETECTED  Influenza AH1 2009 NOT DETECTED  Influenza AH3 POSITIVE  Influenza B NOT DETECTED  Mycoplasma Pneumoniae NOT DETECTED This nucleic acid amplification assay was performed using  the [employer] Engine[employer]. The following pathogens are tested  for: Adenovirus, Coronavirus 229E, Coronavirus HKU1,  Coronavirus NL63, Coronavirus OC43, Human Metapneumovirus  (HMPV), Rhinovirus/Enterovirus, Influenza A H1, Influenza A  H1 2009 (Pandemic H1 2009), Influenza A H3, Influenza A (Flu  A) subtype not identified, Influenza B, Parainfluenza Virus  (types 1, 2, 3, 4), Respiratory Syncytial Virus (RSV),  Bordetella pertussis, Chlamydophila pneumoniae, and  Mycoplasma pneumoniae. A negative FilmArray result does not  always exclude the possibility of viral or bacterial  infection. Laboratory results should always be interpreted  in the context of clinical findings.  NL63 Coronovirus --  OC43 Coronovirus --  Parainfluenza 1 NOT DETECTED  Parainfluenza 2 NOT DETECTED  Parainfluenza 3 NOT DETECTED  Parainfluenza 4 NOT DETECTED  Resp Syncytial Virus NOT DETECTED    Rapid Respiratory Viral Panel Result        01-30 @ 14:15  Rapid RVP --  Coronovirus --  Adenovirus NOT DETECTED  Bordetella Pertussis NOT DETECTED  Chlamydia Pneumonia NOT DETECTED  Entero/RhinovirusNOT DETECTED  HKU1 Coronovirus --  HMPV Coronovirus NOT DETECTED  Influenza A NOT DETECTED (any subtype)  Influenza AH1 NOT DETECTED  Influenza AH1 2009 NOT DETECTED  Influenza AH3 NOT DETECTED  Influenza B NOT DETECTED  Mycoplasma Pneumoniae NOT DETECTED This nucleic acid amplification assay was performed using  the [employer] Film[employer]. The following pathogens are tested  for: Adenovirus, Coronavirus 229E, Coronavirus HKU1,  Coronavirus NL63, Coronavirus OC43, Human Metapneumovirus  (HMPV), Rhinovirus/Enterovirus, Influenza A H1, Influenza A  H1 2009 (Pandemic H1 2009), Influenza A H3, Influenza A (Flu  A) subtype not identified, Influenza B, Parainfluenza Virus  (types 1, 2, 3, 4), Respiratory Syncytial Virus (RSV),  Bordetella pertussis, Chlamydophila pneumoniae, and  Mycoplasma pneumoniae. A negative FilmArray result does not  always exclude the possibility of viral or bacterial  infection. Laboratory results should always be interpreted  in the context of clinical findings.  NL63 Coronovirus --  OC43 Coronovirus --  Parainfluenza 1 NOT DETECTED  Parainfluenza 2 NOT DETECTED  Parainfluenza 3 NOT DETECTED  Parainfluenza 4 NOT DETECTED  Resp Syncytial Virus NOT DETECTED        Studies  Chest X-RAY < from: Xray Chest 1 View- PORTABLE-Routine (02.01.18 @ 08:05) >    EXAM:  XR CHEST PORTABLE ROUTINE 1V        PROCEDURE DATE:  Feb 1 2018         INTERPRETATION:  TIME OF EXAM: February 1, 2018 at 7:51 AM.    CLINICAL INFORMATION: Cough.    COMPARISON: AP chest x-ray from January 30, 2018. CT scan of the chest   from January 31, 2018.    TECHNIQUE:   AP Portable chest x-ray.    INTERPRETATION:     Heart size and the mediastinum cannot be accurately evaluated on this   projection. Coronary artery calcification and/or stent is seen.  Median sternotomy sutures and surgical clips are again seen.  There is minimal linear atelectasis versus scar in the left lower lung.   The lungs are otherwise clear.  No pleural effusion is noted.  The visualized bony structures appear intact.    < end of copied text >    CT SCAN Chest < from: CT Chest No Cont (01.31.18 @ 17:59) >  EXAM:  CT CHEST        PROCEDURE DATE:  Jan 31 2018         INTERPRETATION:  CLINICAL INFORMATION: Fever, shortness of breath   evaluate for pneumonia..    TECHNIQUE: CT scan of the chest was obtained without intravenous   contrast. Coronal and Sagittal reconstructions were performed.  Maximum   Intensity Projection was generated.    COMPARISON: CT chest from January 20, 2018.    FINDINGS:    AIRWAYS, LUNGS AND PLEURA: Patent central airways. Previously noted   groundglass opacities within bothlungs are no longer present. Previously   noted bilateral pleural effusions have resolved.  There is no pneumothorax.  VESSELS: Thoracic aorta is normal in caliber. The aorta and coronary   arteries are calcified.  HEART: Cardiomegaly. There is no pericardial effusion.   MEDIASTINUM: No significant mediastinal or hilar adenopathy is seen.   NECK AND CHEST WALL: The visualized thyroid is homogeneous. There is no   significant supraclavicular or axillary lymphadenopathy.  UPPER ABDOMEN: The visualized upper abdomen is unremarkable.  BONES: Status post median sternotomy.    IMPRESSION:  Resolution of the previously noted bilateral pleural effusions and   groundglass opacities within both lungs when compared to previous exam.      < end of copied text >    CT Abdomen  Venous Dopplers: LE: < from: US Duplex Ext Veins Compress, Bilateral (10.31.14 @ 11:41) >  EXAM:  US DUPLEX EXT VEINS COMPRESS BI        PROCEDURE DATE:  Oct 31 2014       INTERPRETATION:  EXAM: ULTRASOUND OF LOWER EXTREMITIES    COMPARISON: None.    CLINICAL INDICATION: Swelling. Rule out DVT.    TECHNIQUE: Grayscale sonography of the venous structures of  bilateral   lower extremities was performed with color and spectral Doppler   techniques. All venous structures were evaluated with and without   compression.    FINDINGS:    The bilateral common femoral, femoral, popliteal, gastrocnemius, and the   calf veins are patent and demonstrate normal color doppler flow and   compressibility.  Normal respiratory phasicity is identified in the   waveform of CFV bilaterally.    IMPRESSION: No evidence of deep venous thrombosis in bilateral lower   extremities.      < end of copied text >    Others  < from: TTE with Doppler (w/Cont) (01.23.18 @ 12:31) >    Patient name: SELVIN CLAIRE  YOB: 1945   Age: 72 (F)   MR#: [id number]  Study Date: 1/23/2018  Location: [place] [address] Sonographer: Aretha Alcazar  Study quality: Technically Difficult  Referring Physician: Vargas Adame MD  Blood Pressure: 112/70 mmHg  Height: 147 cm  Weight: 59 kg  BSA: 1.5 m2  ------------------------------------------------------------------------  PROCEDURE: Transthoracic echocardiogram with 2-D, M-Mode  and complete spectral and color flow Doppler.  Verbal consent was obtained for injection of echo contrast.  Following  intravenous injection of contrast, harmonic  imaging was performed.  INDICATION: Heart failure, unspecified (I50.9)  ------------------------------------------------------------------------  DIMENSIONS:  Dimensions:     Normal Values:  LA:     3.5 cm    2.0 - 4.0 cm  Ao:     3.0 cm    2.0 - 3.8 cm  SEPTUM: 0.8 cm    0.6 - 1.2 cm  PWT:    0.8 cm    0.6 - 1.1 cm  LVIDd:  5.2 cm    3.0 - 5.6 cm  LVIDs:  4.4 cm    1.8 - 4.0 cm  Derived Variables:  LVMI: 96 g/m2  RWT: 0.30  Fractional short: 15 %  Ejection Fraction (Teicholtz): 32 %  ------------------------------------------------------------------------  OBSERVATIONS:  Mitral Valve: Mitral annular calcification, otherwise  normal mitral valve. Mild-moderate mitral regurgitation.  Aortic Root: Normal aortic root.  Aortic Valve: Calcified trileaflet aortic valve with normal  opening. Mild aortic regurgitation.  Left Atrium: Normal left atrium.  Left Ventricle: Endocardium not well visualized; grossly  severe global left ventricular systolic dysfunction.  Endocardial visualization enhanced with intravenous  injection of echo contrast (Definity). Normal left  ventricular internal dimensions and wall thicknesses.  Right Heart: Normal right atrium. The right ventricle is  not well visualized; grossly normal right ventricular  systolic function. Normal tricuspid valve. Minimal  tricuspid regurgitation. Normal pulmonic valve. Mild  pulmonic regurgitation.  Pericardium/PleuraNormal pericardium with no pericardial  effusion.  ------------------------------------------------------------------------  CONCLUSIONS:  1. Mitral annular calcification, otherwise normal mitral  valve. Mild-moderate mitral regurgitation.  2. Calcified trileaflet aortic valve with normal opening.  Mild aortic regurgitation.  3. Normal left ventricular internal dimensions and wall  thicknesses.    < end of copied text >

## 2018-02-04 NOTE — PROGRESS NOTE ADULT - ASSESSMENT
1.	CHF decompensation, EF 32%  2.	MERVIN: renal function fluctuating likely sec. to CHF. hyperglycemia. On lasix, FEurea<35%. But still within baseline  3.	CKD 3, baseline 1.5-1.8  4.	CAD. cardio following  5.	Acidosis improved   6.	Hypokalemia: improved  7.	fever +flu on Tamiflu     PLAN:   1.	Continue diuretic   2.	Monitor BMP.  1.	f/u cardio

## 2018-02-04 NOTE — PROGRESS NOTE ADULT - ASSESSMENT
71 Female, with a PmHx of HTN, CKD, Hyperlipidemia, DM-II, CAD s/p CABG x 3, s/p stents, presenting to the [facility] ED c/o shortness of breath. Pt is being admitted to telemetry for acute on chronic systolic heart failure.  Now she is with new fever, as well as URI

## 2018-02-04 NOTE — PROGRESS NOTE ADULT - SUBJECTIVE AND OBJECTIVE BOX
Chief complaint  Patient is a 72y old  Female who presents with a chief complaint of SOB (25 Jan 2018 15:14)   Review of systems  Patient in bed, looks comfortable, family at bed side, eating food from home, no fever, no hypoglycemia.    Labs and Fingersticks  CAPILLARY BLOOD GLUCOSE      POCT Blood Glucose.: 148 mg/dL (04 Feb 2018 13:07)  POCT Blood Glucose.: 214 mg/dL (04 Feb 2018 09:08)  POCT Blood Glucose.: 240 mg/dL (03 Feb 2018 21:37)  POCT Blood Glucose.: 162 mg/dL (03 Feb 2018 17:13)          Calcium, Total Serum: 8.9 (02-04 @ 07:10)  Calcium, Total Serum: 8.7 (02-03 @ 07:40)          02-04    139  |  100  |  30<H>  ----------------------------<  255<H>  3.8   |  24  |  1.64<H>    Ca    8.9      04 Feb 2018 07:10  Mg     1.8     02-04                          9.8    8.00  )-----------( 286      ( 04 Feb 2018 07:10 )             31.0     Medications  MEDICATIONS  (STANDING):  aspirin enteric coated 81 milliGRAM(s) Oral daily  atorvastatin 40 milliGRAM(s) Oral at bedtime  dextrose 5%. 1000 milliLiter(s) (50 mL/Hr) IV Continuous <Continuous>  dextrose 50% Injectable 12.5 Gram(s) IV Push once  dextrose 50% Injectable 25 Gram(s) IV Push once  dextrose 50% Injectable 25 Gram(s) IV Push once  famotidine    Tablet 20 milliGRAM(s) Oral daily  furosemide    Tablet 40 milliGRAM(s) Oral daily  heparin  Injectable 5000 Unit(s) SubCutaneous every 12 hours  insulin glargine Injectable (LANTUS) 20 Unit(s) SubCutaneous every morning  insulin glargine Injectable (LANTUS) 68 Unit(s) SubCutaneous at bedtime  insulin lispro (HumaLOG) corrective regimen sliding scale   SubCutaneous three times a day before meals  insulin lispro (HumaLOG) corrective regimen sliding scale   SubCutaneous at bedtime  insulin lispro Injectable (HumaLOG) 24 Unit(s) SubCutaneous three times a day before meals  levothyroxine 50 MICROGram(s) Oral daily  metoprolol succinate ER 12.5 milliGRAM(s) Oral daily  montelukast 10 milliGRAM(s) Oral at bedtime  oseltamivir 30 milliGRAM(s) Oral two times a day  sodium bicarbonate 650 milliGRAM(s) Oral three times a day  sodium chloride 0.9% lock flush 3 milliLiter(s) IV Push every 8 hours  ticagrelor 90 milliGRAM(s) Oral two times a day      Physical Exam  General: Patient comfortable in bed  Vital Signs Last 12 Hrs  T(F): 97.4 (02-04-18 @ 13:16), Max: 98 (02-04-18 @ 05:15)  HR: 84 (02-04-18 @ 13:16) (80 - 84)  BP: 109/59 (02-04-18 @ 13:16) (109/59 - 114/57)  BP(mean): --  RR: 17 (02-04-18 @ 13:16) (17 - 18)  SpO2: 100% (02-04-18 @ 13:16) (100% - 100%)  Neck: No palpable thyroid nodules.  CVS: S1S2, No murmurs  Respiratory: No wheezing, no crepitations  GI: Abdomen soft, bowel sounds positive  Musculoskeletal:  edema lower extremities.   Skin: No skin rashes, no ecchymosis    Diagnostics

## 2018-02-05 LAB
BUN SERPL-MCNC: 29 MG/DL — HIGH (ref 7–23)
CALCIUM SERPL-MCNC: 8.7 MG/DL — SIGNIFICANT CHANGE UP (ref 8.4–10.5)
CHLORIDE SERPL-SCNC: 102 MMOL/L — SIGNIFICANT CHANGE UP (ref 98–107)
CO2 SERPL-SCNC: 25 MMOL/L — SIGNIFICANT CHANGE UP (ref 22–31)
CREAT SERPL-MCNC: 1.6 MG/DL — HIGH (ref 0.5–1.3)
GLUCOSE BLDC GLUCOMTR-MCNC: 115 MG/DL — HIGH (ref 70–99)
GLUCOSE BLDC GLUCOMTR-MCNC: 129 MG/DL — HIGH (ref 70–99)
GLUCOSE BLDC GLUCOMTR-MCNC: 232 MG/DL — HIGH (ref 70–99)
GLUCOSE BLDC GLUCOMTR-MCNC: 261 MG/DL — HIGH (ref 70–99)
GLUCOSE BLDC GLUCOMTR-MCNC: 350 MG/DL — HIGH (ref 70–99)
GLUCOSE SERPL-MCNC: 110 MG/DL — HIGH (ref 70–99)
HCT VFR BLD CALC: 29.2 % — LOW (ref 34.5–45)
HGB BLD-MCNC: 9.4 G/DL — LOW (ref 11.5–15.5)
MCHC RBC-ENTMCNC: 27.2 PG — SIGNIFICANT CHANGE UP (ref 27–34)
MCHC RBC-ENTMCNC: 32.2 % — SIGNIFICANT CHANGE UP (ref 32–36)
MCV RBC AUTO: 84.6 FL — SIGNIFICANT CHANGE UP (ref 80–100)
NRBC # FLD: 0 — SIGNIFICANT CHANGE UP
PLATELET # BLD AUTO: 278 K/UL — SIGNIFICANT CHANGE UP (ref 150–400)
PMV BLD: 9 FL — SIGNIFICANT CHANGE UP (ref 7–13)
POTASSIUM SERPL-MCNC: 3.4 MMOL/L — LOW (ref 3.5–5.3)
POTASSIUM SERPL-SCNC: 3.4 MMOL/L — LOW (ref 3.5–5.3)
RBC # BLD: 3.45 M/UL — LOW (ref 3.8–5.2)
RBC # FLD: 15.3 % — HIGH (ref 10.3–14.5)
SODIUM SERPL-SCNC: 142 MMOL/L — SIGNIFICANT CHANGE UP (ref 135–145)
WBC # BLD: 9.24 K/UL — SIGNIFICANT CHANGE UP (ref 3.8–10.5)
WBC # FLD AUTO: 9.24 K/UL — SIGNIFICANT CHANGE UP (ref 3.8–10.5)

## 2018-02-05 RX ORDER — INSULIN GLARGINE 100 [IU]/ML
48 INJECTION, SOLUTION SUBCUTANEOUS ONCE
Qty: 0 | Refills: 0 | Status: COMPLETED | OUTPATIENT
Start: 2018-02-05 | End: 2018-02-05

## 2018-02-05 RX ORDER — FERROUS SULFATE 325(65) MG
325 TABLET ORAL DAILY
Qty: 0 | Refills: 0 | Status: DISCONTINUED | OUTPATIENT
Start: 2018-02-05 | End: 2018-02-09

## 2018-02-05 RX ORDER — POTASSIUM CHLORIDE 20 MEQ
40 PACKET (EA) ORAL EVERY 4 HOURS
Qty: 0 | Refills: 0 | Status: COMPLETED | OUTPATIENT
Start: 2018-02-05 | End: 2018-02-05

## 2018-02-05 RX ADMIN — Medication 40 MILLIEQUIVALENT(S): at 13:26

## 2018-02-05 RX ADMIN — TICAGRELOR 90 MILLIGRAM(S): 90 TABLET ORAL at 18:24

## 2018-02-05 RX ADMIN — Medication 30 MILLIGRAM(S): at 06:04

## 2018-02-05 RX ADMIN — MONTELUKAST 10 MILLIGRAM(S): 4 TABLET, CHEWABLE ORAL at 21:57

## 2018-02-05 RX ADMIN — INSULIN GLARGINE 48 UNIT(S): 100 INJECTION, SOLUTION SUBCUTANEOUS at 21:59

## 2018-02-05 RX ADMIN — SODIUM CHLORIDE 3 MILLILITER(S): 9 INJECTION INTRAMUSCULAR; INTRAVENOUS; SUBCUTANEOUS at 14:07

## 2018-02-05 RX ADMIN — Medication 24 UNIT(S): at 13:24

## 2018-02-05 RX ADMIN — Medication 40 MILLIGRAM(S): at 06:04

## 2018-02-05 RX ADMIN — ATORVASTATIN CALCIUM 40 MILLIGRAM(S): 80 TABLET, FILM COATED ORAL at 21:57

## 2018-02-05 RX ADMIN — Medication 4: at 21:58

## 2018-02-05 RX ADMIN — Medication 650 MILLIGRAM(S): at 13:26

## 2018-02-05 RX ADMIN — SODIUM CHLORIDE 3 MILLILITER(S): 9 INJECTION INTRAMUSCULAR; INTRAVENOUS; SUBCUTANEOUS at 21:59

## 2018-02-05 RX ADMIN — Medication 81 MILLIGRAM(S): at 10:44

## 2018-02-05 RX ADMIN — Medication 40 MILLIEQUIVALENT(S): at 18:24

## 2018-02-05 RX ADMIN — Medication 24 UNIT(S): at 18:24

## 2018-02-05 RX ADMIN — TICAGRELOR 90 MILLIGRAM(S): 90 TABLET ORAL at 06:04

## 2018-02-05 RX ADMIN — FAMOTIDINE 20 MILLIGRAM(S): 10 INJECTION INTRAVENOUS at 10:44

## 2018-02-05 RX ADMIN — Medication 50 MICROGRAM(S): at 06:04

## 2018-02-05 RX ADMIN — SODIUM CHLORIDE 3 MILLILITER(S): 9 INJECTION INTRAMUSCULAR; INTRAVENOUS; SUBCUTANEOUS at 06:05

## 2018-02-05 RX ADMIN — Medication 12.5 MILLIGRAM(S): at 06:05

## 2018-02-05 RX ADMIN — Medication 650 MILLIGRAM(S): at 06:04

## 2018-02-05 RX ADMIN — HEPARIN SODIUM 5000 UNIT(S): 5000 INJECTION INTRAVENOUS; SUBCUTANEOUS at 06:05

## 2018-02-05 RX ADMIN — INSULIN GLARGINE 20 UNIT(S): 100 INJECTION, SOLUTION SUBCUTANEOUS at 10:44

## 2018-02-05 RX ADMIN — Medication 6: at 13:24

## 2018-02-05 RX ADMIN — Medication 4: at 18:25

## 2018-02-05 RX ADMIN — Medication 650 MILLIGRAM(S): at 21:57

## 2018-02-05 NOTE — PROGRESS NOTE ADULT - SUBJECTIVE AND OBJECTIVE BOX
Dr. Muhammad (Nephrology)  Office [phone]  Cell [phone]  [facility]  Cell [phone]      Patient is a 72y old  Female who presents with a chief complaint of SOB (25 Jan 2018 15:14)      Patient seen and examined at bedside. No chest pain/sob    VITALS:  T(F): 97.8 (02-05-18 @ 13:01), Max: 98 (02-04-18 @ 21:25)  HR: 82 (02-05-18 @ 13:01)  BP: 109/58 (02-05-18 @ 13:01)  RR: 18 (02-05-18 @ 13:01)  SpO2: 100% (02-05-18 @ 13:01)  Wt(kg): --    02-04 @ 07:01  -  02-05 @ 07:00  --------------------------------------------------------  IN: 240 mL / OUT: 0 mL / NET: 240 mL          PHYSICAL EXAM:  Constitutional: NAD  Neck: No JVD  Respiratory: CTAB, no wheezes, rales or rhonchi  Cardiovascular: S1, S2, RRR  Gastrointestinal: BS+, soft, NT/ND  Extremities: No peripheral edema    Hospital Medications:   MEDICATIONS  (STANDING):  aspirin enteric coated 81 milliGRAM(s) Oral daily  atorvastatin 40 milliGRAM(s) Oral at bedtime  dextrose 5%. 1000 milliLiter(s) (50 mL/Hr) IV Continuous <Continuous>  dextrose 50% Injectable 12.5 Gram(s) IV Push once  dextrose 50% Injectable 25 Gram(s) IV Push once  dextrose 50% Injectable 25 Gram(s) IV Push once  famotidine    Tablet 20 milliGRAM(s) Oral daily  furosemide    Tablet 40 milliGRAM(s) Oral daily  heparin  Injectable 5000 Unit(s) SubCutaneous every 12 hours  insulin glargine Injectable (LANTUS) 20 Unit(s) SubCutaneous every morning  insulin glargine Injectable (LANTUS) 68 Unit(s) SubCutaneous at bedtime  insulin lispro (HumaLOG) corrective regimen sliding scale   SubCutaneous three times a day before meals  insulin lispro (HumaLOG) corrective regimen sliding scale   SubCutaneous at bedtime  insulin lispro Injectable (HumaLOG) 24 Unit(s) SubCutaneous three times a day before meals  levothyroxine 50 MICROGram(s) Oral daily  metoprolol succinate ER 12.5 milliGRAM(s) Oral daily  montelukast 10 milliGRAM(s) Oral at bedtime  potassium chloride    Tablet ER 40 milliEquivalent(s) Oral every 4 hours  sodium bicarbonate 650 milliGRAM(s) Oral three times a day  sodium chloride 0.9% lock flush 3 milliLiter(s) IV Push every 8 hours  ticagrelor 90 milliGRAM(s) Oral two times a day      LABS:  02-05    142  |  102  |  29<H>  ----------------------------<  110<H>  3.4<L>   |  25  |  1.60<H>    Ca    8.7      05 Feb 2018 05:32  Mg     1.8     02-04      Creatinine Trend: 1.60 <--, 1.64 <--, 1.52 <--, 1.70 <--, 1.61 <--, 1.56 <--, 1.53 <--                                9.4    9.24  )-----------( 278      ( 05 Feb 2018 05:32 )             29.2     Urine Studies:  Urinalysis - [01-30-18 @ 15:20]      Color PLYEL / Appearance CLEAR / SG 1.018 / pH 6.5      Gluc >1000 / Ketone NEGATIVE  / Bili NEGATIVE / Urobili NORMAL       Blood TRACE / Protein 150 / Leuk Est NEGATIVE / Nitrite NEGATIVE      RBC 0-2 / WBC 0-2 / Hyaline  / Gran  / Sq Epi OCC / Non Sq Epi  / Bacteria       Iron 40, TIBC 377, %sat --      [01-20-18 @ 06:06]  Ferritin 38.35      [01-20-18 @ 06:06]  HbA1c 9.1      [11-25-17 @ 06:30]  TSH 0.55      [01-19-18 @ 06:45]  Lipid: chol 130, , HDL 30, LDL 70      [01-19-18 @ 06:45]        RADIOLOGY & ADDITIONAL STUDIES:

## 2018-02-05 NOTE — PROGRESS NOTE ADULT - SUBJECTIVE AND OBJECTIVE BOX
Patient is a 72y old  Female who presents with a chief complaint of SOB (25 Jan 2018 15:14)      Any change in ROS: Doing ok 'no SOB     MEDICATIONS  (STANDING):  aspirin enteric coated 81 milliGRAM(s) Oral daily  atorvastatin 40 milliGRAM(s) Oral at bedtime  dextrose 5%. 1000 milliLiter(s) (50 mL/Hr) IV Continuous <Continuous>  dextrose 50% Injectable 12.5 Gram(s) IV Push once  dextrose 50% Injectable 25 Gram(s) IV Push once  dextrose 50% Injectable 25 Gram(s) IV Push once  famotidine    Tablet 20 milliGRAM(s) Oral daily  ferrous    sulfate 325 milliGRAM(s) Oral daily  furosemide    Tablet 40 milliGRAM(s) Oral daily  heparin  Injectable 5000 Unit(s) SubCutaneous every 12 hours  insulin glargine Injectable (LANTUS) 20 Unit(s) SubCutaneous every morning  insulin glargine Injectable (LANTUS) 68 Unit(s) SubCutaneous at bedtime  insulin lispro (HumaLOG) corrective regimen sliding scale   SubCutaneous three times a day before meals  insulin lispro (HumaLOG) corrective regimen sliding scale   SubCutaneous at bedtime  insulin lispro Injectable (HumaLOG) 24 Unit(s) SubCutaneous three times a day before meals  levothyroxine 50 MICROGram(s) Oral daily  metoprolol succinate ER 12.5 milliGRAM(s) Oral daily  montelukast 10 milliGRAM(s) Oral at bedtime  potassium chloride    Tablet ER 40 milliEquivalent(s) Oral every 4 hours  sodium bicarbonate 650 milliGRAM(s) Oral three times a day  sodium chloride 0.9% lock flush 3 milliLiter(s) IV Push every 8 hours  ticagrelor 90 milliGRAM(s) Oral two times a day    MEDICATIONS  (PRN):  acetaminophen   Tablet 650 milliGRAM(s) Oral every 6 hours PRN For Temp greater than 38 C (100.4 F)  acetaminophen   Tablet. 650 milliGRAM(s) Oral every 6 hours PRN Mild, moderate and severe pain  benzocaine 15 mG/menthol 3.6 mG Lozenge 1 Lozenge Oral every 6 hours PRN Sore Throat  dextrose Gel 1 Dose(s) Oral once PRN Blood Glucose LESS THAN 70 milliGRAM(s)/deciliter  glucagon  Injectable 1 milliGRAM(s) IntraMuscular once PRN Glucose LESS THAN 70 milligrams/deciliter  guaiFENesin    Syrup 100 milliGRAM(s) Oral every 6 hours PRN Cough  sodium chloride 0.65% Nasal 1 Spray(s) Both Nostrils three times a day PRN Nasal Congestion    Vital Signs Last 24 Hrs  T(C): 36.6 (05 Feb 2018 13:01), Max: 36.7 (04 Feb 2018 21:25)  T(F): 97.8 (05 Feb 2018 13:01), Max: 98 (04 Feb 2018 21:25)  HR: 82 (05 Feb 2018 13:01) (77 - 93)  BP: 109/58 (05 Feb 2018 13:01) (106/62 - 128/58)  BP(mean): --  RR: 18 (05 Feb 2018 13:01) (18 - 18)  SpO2: 100% (05 Feb 2018 13:01) (100% - 100%)    I&O's Summary    04 Feb 2018 07:01  -  05 Feb 2018 07:00  --------------------------------------------------------  IN: 240 mL / OUT: 0 mL / NET: 240 mL          Physical Exam:   GENERAL: NAD, well-groomed, well-developed  HEENT: MOHSEN/   Atraumatic, Normocephalic  ENMT: No tonsillar erythema, exudates, or enlargement; Moist mucous membranes, Good dentition, No lesions  NECK: Supple, No JVD, Normal thyroid  CHEST/LUNG: No Wheezng  CVS: Regular rate and rhythm; No murmurs, rubs, or gallops  GI: : Soft, Nontender, Nondistended; Bowel sounds present  NERVOUS SYSTEM:  Alert & Oriented X3  EXTREMITIES:  2+ Peripheral Pulses, No clubbing, cyanosis, or edema  LYMPH: No lymphadenopathy noted  SKIN: No rashes or lesions  ENDOCRINOLOGY: No Thyromegaly  PSYCH: Appropriate    Labs:                              9.4    9.24  )-----------( 278      ( 05 Feb 2018 05:32 )             29.2                         9.8    8.00  )-----------( 286      ( 04 Feb 2018 07:10 )             31.0                         10.0   8.05  )-----------( 298      ( 03 Feb 2018 07:40 )             32.3                         9.8    5.58  )-----------( 263      ( 02 Feb 2018 04:00 )             31.2     02-05    142  |  102  |  29<H>  ----------------------------<  110<H>  3.4<L>   |  25  |  1.60<H>  02-04    139  |  100  |  30<H>  ----------------------------<  255<H>  3.8   |  24  |  1.64<H>  02-03    140  |  101  |  31<H>  ----------------------------<  220<H>  3.7   |  23  |  1.52<H>  02-02    138  |  100  |  35<H>  ----------------------------<  315<H>  4.0   |  23  |  1.70<H>    Ca    8.7      05 Feb 2018 05:32  Ca    8.9      04 Feb 2018 07:10  Mg     1.8     02-04      CAPILLARY BLOOD GLUCOSE      POCT Blood Glucose.: 261 mg/dL (05 Feb 2018 13:02)  POCT Blood Glucose.: 129 mg/dL (05 Feb 2018 09:06)  POCT Blood Glucose.: 115 mg/dL (05 Feb 2018 06:11)  POCT Blood Glucose.: 211 mg/dL (04 Feb 2018 21:55)  POCT Blood Glucose.: 309 mg/dL (04 Feb 2018 17:28)            Cultures:           Wound culture:                01-30 @ 15:33  Organism --  Culture w/ gram stain --  Specimen Source BLOOD VENOUS      Abscess culture:             01-30 @ 15:33  Organism --  Gram Stain --  Specimen Source BLOOD VENOUS        Tissue culture:           01-30 @ 15:33  Organism --  Gram Stain --  Specimen Source BLOOD VENOUS      Body Fluid Smear & Culture:                        01-30 @ 15:33  AFB Smear  --  Culture Acid Fast Body Fluid w/ Smear  --  Culture Acid Fast Smear Concentrated   --    Culture Results:     --  Specimen Source BLOOD VENOUS        Rapid Respiratory Viral Panel Result        01-31 @ 20:56  Rapid RVP --  Coronovirus --  Adenovirus NOT DETECTED  Bordetella Pertussis NOT DETECTED  Chlamydia Pneumonia NOT DETECTED  Entero/RhinovirusNOT DETECTED  HKU1 Coronovirus --  HMPV Coronovirus NOT DETECTED  Influenza A --  Influenza AH1 NOT DETECTED  Influenza AH1 2009 NOT DETECTED  Influenza AH3 POSITIVE  Influenza B NOT DETECTED  Mycoplasma Pneumoniae NOT DETECTED This nucleic acid amplification assay was performed using  the [employer]. The following pathogens are tested  for: Adenovirus, Coronavirus 229E, Coronavirus HKU1,  Coronavirus NL63, Coronavirus OC43, Human Metapneumovirus  (HMPV), Rhinovirus/Enterovirus, Influenza A H1, Influenza A  H1 2009 (Pandemic H1 2009), Influenza A H3, Influenza A (Flu  A) subtype not identified, Influenza B, Parainfluenza Virus  (types 1, 2, 3, 4), Respiratory Syncytial Virus (RSV),  Bordetella pertussis, Chlamydophila pneumoniae, and  Mycoplasma pneumoniae. A negative FilmArray result does not  always exclude the possibility of viral or bacterial  infection. Laboratory results should always be interpreted  in the context of clinical findings.  NL63 Coronovirus --  OC43 Coronovirus --  Parainfluenza 1 NOT DETECTED  Parainfluenza 2 NOT DETECTED  Parainfluenza 3 NOT DETECTED  Parainfluenza 4 NOT DETECTED  Resp Syncytial Virus NOT DETECTED    Rapid Respiratory Viral Panel Result        01-30 @ 14:15  Rapid RVP --  Coronovirus --  Adenovirus NOT DETECTED  Bordetella Pertussis NOT DETECTED  Chlamydia Pneumonia NOT DETECTED  Entero/RhinovirusNOT DETECTED  HKU1 Coronovirus --  HMPV Coronovirus NOT DETECTED  Influenza A NOT DETECTED (any subtype)  Influenza AH1 NOT DETECTED  Influenza AH1 2009 NOT DETECTED  Influenza AH3 NOT DETECTED  Influenza B NOT DETECTED  Mycoplasma Pneumoniae NOT DETECTED This nucleic acid amplification assay was performed using  the [employer]. The following pathogens are tested  for: Adenovirus, Coronavirus 229E, Coronavirus HKU1,  Coronavirus NL63, Coronavirus OC43, Human Metapneumovirus  (HMPV), Rhinovirus/Enterovirus, Influenza A H1, Influenza A  H1 2009 (Pandemic H1 2009), Influenza A H3, Influenza A (Flu  A) subtype not identified, Influenza B, Parainfluenza Virus  (types 1, 2, 3, 4), Respiratory Syncytial Virus (RSV),  Bordetella pertussis, Chlamydophila pneumoniae, and  Mycoplasma pneumoniae. A negative FilmArray result does not  always exclude the possibility of viral or bacterial  infection. Laboratory results should always be interpreted  in the context of clinical findings.  NL63 Coronovirus --  OC43 Coronovirus --  Parainfluenza 1 NOT DETECTED  Parainfluenza 2 NOT DETECTED  Parainfluenza 3 NOT DETECTED  Parainfluenza 4 NOT DETECTED  Resp Syncytial Virus NOT DETECTED      < from: Xray Chest 1 View- PORTABLE-Routine (02.01.18 @ 08:05) >  COMPARISON: AP chest x-ray from January 30, 2018. CT scan of the chest   from January 31, 2018.    TECHNIQUE:   AP Portable chest x-ray.    INTERPRETATION:     Heart size and the mediastinum cannot be accurately evaluated on this   projection. Coronary artery calcification and/or stent is seen.  Median sternotomy sutures and surgical clips are again seen.  There is minimal linear atelectasis versus scar in the left lower lung.   The lungs are otherwise clear.  No pleural effusion is noted.  The visualized bony structures appear intact.    < end of copied text >      Studies  Chest X-RAY  CT SCAN Chest   Venous Dopplers: LE:   CT Abdomen  Others

## 2018-02-05 NOTE — PROGRESS NOTE ADULT - ASSESSMENT
71 Female, with a PmHx of HTN, CKD, Hyperlipidemia, DM-II, CAD s/p CABG x 3, s/p stents, presenting to the [facility] ED c/o shortness of breath. Pt is being admitted to telemetry for acute on chronic systolic heart failure.  Now she is with new fever, as well as URI

## 2018-02-05 NOTE — PROGRESS NOTE ADULT - ASSESSMENT
1.	CHF decompensation, EF 32%  2.	MERVIN: renal function fluctuating likely sec. to CHF. hyperglycemia. On lasix, FEurea<35%. But still within baseline  3.	CKD 3, baseline 1.5-1.8  4.	CAD. cardio following  5.	Acidosis improved   6.	Hypokalemia  7.	fever +flu on Tamiflu   8.	anemia: iron sat 10%    PLAN:   1.	Continue diuretic   2.	Monitor BMP.  1.	f/u cardio  2.	Kcl 40meq x2  3.	oral iron supplement

## 2018-02-05 NOTE — PROGRESS NOTE ADULT - SUBJECTIVE AND OBJECTIVE BOX
Chief complaint  Patient is a 72y old  Female who presents with a chief complaint of SOB (25 Jan 2018 15:14)   Review of systems  Patient in bed, looks comfortable, no fever, no hypoglycemia.    Labs and Fingersticks  CAPILLARY BLOOD GLUCOSE      POCT Blood Glucose.: 129 mg/dL (05 Feb 2018 09:06)  POCT Blood Glucose.: 115 mg/dL (05 Feb 2018 06:11)  POCT Blood Glucose.: 211 mg/dL (04 Feb 2018 21:55)  POCT Blood Glucose.: 309 mg/dL (04 Feb 2018 17:28)  POCT Blood Glucose.: 148 mg/dL (04 Feb 2018 13:07)          Calcium, Total Serum: 8.7 (02-05 @ 05:32)  Calcium, Total Serum: 8.9 (02-04 @ 07:10)          02-05    142  |  102  |  29<H>  ----------------------------<  110<H>  3.4<L>   |  25  |  1.60<H>    Ca    8.7      05 Feb 2018 05:32  Mg     1.8     02-04                          9.4    9.24  )-----------( 278      ( 05 Feb 2018 05:32 )             29.2     Medications  MEDICATIONS  (STANDING):  aspirin enteric coated 81 milliGRAM(s) Oral daily  atorvastatin 40 milliGRAM(s) Oral at bedtime  dextrose 5%. 1000 milliLiter(s) (50 mL/Hr) IV Continuous <Continuous>  dextrose 50% Injectable 12.5 Gram(s) IV Push once  dextrose 50% Injectable 25 Gram(s) IV Push once  dextrose 50% Injectable 25 Gram(s) IV Push once  famotidine    Tablet 20 milliGRAM(s) Oral daily  furosemide    Tablet 40 milliGRAM(s) Oral daily  heparin  Injectable 5000 Unit(s) SubCutaneous every 12 hours  insulin glargine Injectable (LANTUS) 20 Unit(s) SubCutaneous every morning  insulin glargine Injectable (LANTUS) 68 Unit(s) SubCutaneous at bedtime  insulin lispro (HumaLOG) corrective regimen sliding scale   SubCutaneous three times a day before meals  insulin lispro (HumaLOG) corrective regimen sliding scale   SubCutaneous at bedtime  insulin lispro Injectable (HumaLOG) 24 Unit(s) SubCutaneous three times a day before meals  levothyroxine 50 MICROGram(s) Oral daily  metoprolol succinate ER 12.5 milliGRAM(s) Oral daily  montelukast 10 milliGRAM(s) Oral at bedtime  potassium chloride    Tablet ER 40 milliEquivalent(s) Oral every 4 hours  sodium bicarbonate 650 milliGRAM(s) Oral three times a day  sodium chloride 0.9% lock flush 3 milliLiter(s) IV Push every 8 hours  ticagrelor 90 milliGRAM(s) Oral two times a day      Physical Exam  General: Patient comfortable in bed  Vital Signs Last 12 Hrs  T(F): 97.8 (02-05-18 @ 06:02), Max: 97.8 (02-05-18 @ 06:02)  HR: 77 (02-05-18 @ 06:02) (77 - 77)  BP: 106/62 (02-05-18 @ 06:02) (106/62 - 106/62)  BP(mean): --  RR: 18 (02-05-18 @ 06:02) (18 - 18)  SpO2: 100% (02-05-18 @ 06:02) (100% - 100%)  Neck: No palpable thyroid nodules.  CVS: S1S2, No murmurs  Respiratory: No wheezing, no crepitations  GI: Abdomen soft, bowel sounds positive  Musculoskeletal:  edema lower extremities.   Skin: No skin rashes, no ecchymosis    Diagnostics

## 2018-02-05 NOTE — PROGRESS NOTE ADULT - SUBJECTIVE AND OBJECTIVE BOX
Subjective:  no CP or SOB      MEDICATIONS  (STANDING):  aspirin enteric coated 81 milliGRAM(s) Oral daily  atorvastatin 40 milliGRAM(s) Oral at bedtime  dextrose 5%. 1000 milliLiter(s) (50 mL/Hr) IV Continuous <Continuous>  dextrose 50% Injectable 12.5 Gram(s) IV Push once  dextrose 50% Injectable 25 Gram(s) IV Push once  dextrose 50% Injectable 25 Gram(s) IV Push once  famotidine    Tablet 20 milliGRAM(s) Oral daily  ferrous    sulfate 325 milliGRAM(s) Oral daily  furosemide    Tablet 40 milliGRAM(s) Oral daily  heparin  Injectable 5000 Unit(s) SubCutaneous every 12 hours  insulin glargine Injectable (LANTUS) 20 Unit(s) SubCutaneous every morning  insulin glargine Injectable (LANTUS) 68 Unit(s) SubCutaneous at bedtime  insulin lispro (HumaLOG) corrective regimen sliding scale   SubCutaneous three times a day before meals  insulin lispro (HumaLOG) corrective regimen sliding scale   SubCutaneous at bedtime  insulin lispro Injectable (HumaLOG) 24 Unit(s) SubCutaneous three times a day before meals  levothyroxine 50 MICROGram(s) Oral daily  metoprolol succinate ER 12.5 milliGRAM(s) Oral daily  montelukast 10 milliGRAM(s) Oral at bedtime  potassium chloride    Tablet ER 40 milliEquivalent(s) Oral every 4 hours  sodium bicarbonate 650 milliGRAM(s) Oral three times a day  sodium chloride 0.9% lock flush 3 milliLiter(s) IV Push every 8 hours  ticagrelor 90 milliGRAM(s) Oral two times a day    MEDICATIONS  (PRN):  acetaminophen   Tablet 650 milliGRAM(s) Oral every 6 hours PRN For Temp greater than 38 C (100.4 F)  acetaminophen   Tablet. 650 milliGRAM(s) Oral every 6 hours PRN Mild, moderate and severe pain  benzocaine 15 mG/menthol 3.6 mG Lozenge 1 Lozenge Oral every 6 hours PRN Sore Throat  dextrose Gel 1 Dose(s) Oral once PRN Blood Glucose LESS THAN 70 milliGRAM(s)/deciliter  glucagon  Injectable 1 milliGRAM(s) IntraMuscular once PRN Glucose LESS THAN 70 milligrams/deciliter  guaiFENesin    Syrup 100 milliGRAM(s) Oral every 6 hours PRN Cough  sodium chloride 0.65% Nasal 1 Spray(s) Both Nostrils three times a day PRN Nasal Congestion      LABS:                        9.4    9.24  )-----------( 278      ( 05 Feb 2018 05:32 )             29.2     142  |  102  |  29<H>  ----------------------------<  110<H>  3.4<L>   |  25  |  1.60<H>    Ca    8.7      05 Feb 2018 05:32  Mg     1.8     02-04  Creatinine Trend: 1.60<--, 1.64<--, 1.52<--, 1.70<--, 1.61<--, 1.56<--     PHYSICAL EXAM  Vital Signs Last 24 Hrs  T(C): 36.6 (05 Feb 2018 13:01), Max: 36.7 (04 Feb 2018 21:25)  T(F): 97.8 (05 Feb 2018 13:01), Max: 98 (04 Feb 2018 21:25)  HR: 82 (05 Feb 2018 13:01) (77 - 93)  BP: 109/58 (05 Feb 2018 13:01) (106/62 - 128/58)  RR: 18 (05 Feb 2018 13:01) (18 - 18)  SpO2: 100% (05 Feb 2018 13:01) (100% - 100%)    Cardiovascular: S1S2 RRR  Respiratory: CTA BL   Gastrointestinal:  Soft, Non-tender, + BS	  Extremities No edema, cyanosis or clubbing  B/L LE's     DIAGNOSTIC DATA:     TELEMETRY: SR 	       < from: Cardiac Cath Lab - Adult (10.13.17 @ 10:40) >  VENTRICLES: No left ventriculogram was performed.  CORONARY VESSELS: The coronary circulation is right dominant.  LM:   --  LM: Normal.  LAD:   --  Proximal LAD: There was a diffuse 60 % stenosis.  --  Mid LAD: There was a 100 % stenosis with the distal vessel being filled  via LIMA-LAD.  CX:   --  Circumflex: The vessel was small sized. Angiography showed mild  atherosclerosis withno flow limiting lesions.  --  OM1: Angiography showed mild atherosclerosis with no flow limiting  lesions.  RI:   --  Ostial ramus intermedius: There was a tubular 80 % stenosis.  There was PARUL grade 3 flow through the vessel (brisk flow).  RCA:   --  Proximal RCA: Angiography showed mild atherosclerosis with no  flow limiting lesions.  --  Mid RCA: There was a 100 % stenosis with the distal vessel being filled  via collaterals. This lesion is a chronic total occlusion.  GRAFTS:   --  Graft to the LAD: The graft was a LIMA. Graft angiography  showed minor luminal irregularities. Distal vessel angiography showed  minor luminal irregularities.  AORTA: Ascending aorta: Normal. No additional grafts visualized in  aortogram.  COMPLICATIONS: There were no complications.  DIAGNOSTIC RECOMMENDATIONS: Coronary angiogram demonstrates patent  LIMA-LAD, closed SVG grafts, and a severe stenosis in the ostial Ramus.  Will perform staged PCI to RI when Cr stable and renally optimized.  Prepared and signed by  Corie Ga M.D.  Signed 10/17/2017 13:49:15    < end of copied text >    < from: Cardiac Cath Lab - Adult (11.17.17 @ 11:48) >  PROCEDURE:  --  Intervention on ramus intermedius: drug-eluting stent.    VENTRICLES: No LV gram was performed; however, a recent echocardiogram  demonstrated normal global and regional LV function.  CORONARY VESSELS: The coronary circulation is right dominant.  LM:   --  LM: Angiography showed minor luminal irregularities with no flow  limiting lesions.  LAD:   --  Ostial LAD: There was a 100 % stenosis.  CX:   --  Circumflex: This vessel was not injected, but was visualized  during a prior cardiac catheterization.  RI:   --  Ramus intermedius: There was a 95 % stenosis.  RCA:   --  RCA: This vessel was not injected.  COMPLICATIONS: There were no complications.  DIAGNOSTIC RECOMMENDATIONS: The patient should continue with the present  medications. will add plavix 75mg po once a day/ will add ECASA 325mg po  once day.  Prepared and signed by  Roberta Quick M.D.  Signed 11/17/2017 13:16:01    < end of copied text >    < from: TTE with Doppler (w/Cont) (11.25.17 @ 12:33) >  CONCLUSIONS:  1. Mitral annular calcification, otherwise normal mitral  valve. Mild mitral regurgitation.  2. Normal left ventricular internal dimensions and wall  thicknesses.  3. Endocardium not well visualized; grossly moderate to  severe segmental left ventricularsystolic dysfunction.  Hypokinesis of the inferior, lateral, and inferolateral  walls.  Endocardial visualization enhanced with intravenous  injection of echo contrast (Definity).  No LV thrombus  seen.  4. The right ventricle is not well visualized;grossly  normal right ventricular systolic function.  ------------------------------------------------------------------------  Confirmed on  11/25/2017 - 14:44:41 by Jose Lucia M.D.    < end of copied text >    < from: Cardiac Cath Lab - Adult (12.05.17 @ 12:48) >  VENTRICLES: No LV gram was performed; however, a recent echocardiogram  demonstrated an EF of 36 %.  CORONARY VESSELS: The coronary circulation is right dominant.  LM:   --  Distal left main: Angiography showed minor luminal irregularities  with no flow limiting lesions.  LAD:   --  Mid LAD: There was a 100 % stenosis with the distal vessel being  filled via LIMA-LAD.  CX:   --  Proximal circumflex: There was a tubular 20 % stenosis.  --  Mid circumflex: Angiography showed minor luminal irregularities with no  flow limiting lesions.  --  Distal circumflex: Angiography showed minor luminal irregularities with  no flow limiting lesions.  --  OM1: The vessel was small sized. There was a discrete 60 % stenosis.  RI:   --  Ostial ramus intermedius: Angiography showed minor luminal  irregularities with no flow limiting lesions. There was no significant  restenosis in RI stent.  --  Proximal ramus intermedius: Angiography showed minor luminal  irregularities with no flow limiting lesions.  --  Mid ramus intermedius: There was a tubular 80 % stenosis. The lesion  was associated with a small filling defect consistent with thrombus.  RCA:   --  Mid RCA: There was a 100 % stenosis with the distal vessel being  filled via collaterals from the LAD.  GRAFTS:   --  Graft to the LAD: The graft was a LIMA. Graft angiography  showed minor luminal irregularities. Distal vessel angiography showed  minor luminalirregularities.  COMPLICATIONS: There were no complications.  DIAGNOSTIC RECOMMENDATIONS: Coronary angiogram demonstrates a severe  stenosis in the ramus intermedius that is the cause of the patient's  clinical presentation. Will therefore perform PCI to the RI.  INTERVENTIONAL RECOMMENDATIONS: S/p successful CORNELIA to the Ramus  Intermedius. The patient should continue with ASA and Brilinta for at  least 12 months.  Prepared and signed by  Corie Ga M.D.  Signed 12/06/2017 17:06:30    < from: CT Chest No Cont (01.20.18 @ 09:55) >  IMPRESSION:   Mild pulmonary edema with small simple bilateral pleural effusions, right   greater than left, with no evidence of loculation.    < end of copied text >    < from: TTE with Doppler (w/Cont) (01.23.18 @ 12:31) >  CONCLUSIONS:  1. Mitral annular calcification, otherwise normal mitral  valve. Mild-moderate mitral regurgitation.  2. Calcified trileaflet aortic valve with normal opening.  Mild aortic regurgitation.  3. Normal left ventricular internal dimensions and wall  thicknesses.  4. Endocardium not well visualized; grossly severe global  left ventricular systolic dysfunction.  Endocardial  visualization enhanced with intravenous injection of echo  contrast (Definity).  5. The right ventricle is not well visualized; grossly  normal right ventricular systolic function.  *** Compared with echocardiogram of 11/25/2017, no  significant changes noted.  ------------------------------------------------------------------------  Confirmed on  1/23/2018 - 14:35:49 by Jose Lucia M.D.    < end of copied text >    < from: Nuclear Stress Test-Pharmacologic (01.28.18 @ 12:31) >  IMPRESSIONS:Abnormal Study  * Myocardial Perfusion SPECT results are abnormal.  * There is a medium sized, mild to moderate defect in  anterior wall that is reversible, suggestive of  ischemia.There are large, moderate to severe defects in  inferolateral, lateral walls that are fixed, suggestive of  infarct.  * Post-stress gated wall motion analysis was performed  (LVEF = 33 %;LVEDV = 116 ml.), revealing severe overall  hypokinesis. There was focal inferiolateral akinesis and  severe lateral hypokinesis with absence of systolic  thickening. The best motion was in the septum.  *** Compared with Nuclear/Stress test of 11/5/2014, the  observed defect are now more extensive, suggesting  progression of disease. Prior LVEF was 39% with EDV 77 mL.  ------------------------------------------------------------------------  Revised on  1/29/2018 - 16:44:19 by Lorenzo Covarrubias M.D.  Confirmed on  1/30/2018 - 15:45:44 by José Hansen M.D.  < end of copied text >    < from: CT Chest No Cont (01.31.18 @ 17:59) >  IMPRESSION:  Resolution of the previously noted bilateral pleural effusions and   groundglass opacities within both lungs when compared to previous exam.    < end of copied text >    ASSESSMENT/PLAN: 	72y Female w/ PMH HTN, CKD, Hyperlipidemia, DM-II, CAD s/p 3V CABG  (LIMA to LAD, SVG to OM, SVG to PDA) at [facility] 2014, s/p CORNELIA TO RI 11/17/17, NSTEMI 12/2017 s/p LHC on 12/5 and CORNELIA to D Ramus, TTE at that time revealed grossly moderate to severe LV dysfunction with hypokinesis of the inferior, lateral and inferolateral with an EF of 36% admitted with acute on chronic systolic CHF, s/p IV diureses, s/p NST as above, now s/p fever 1/30/18--RVP negative     --Cont PO Lasix  --mildly elevated trops noted in setting of CKD & CHF exacerbation   --Repeat TTE unchanged from prior. EF 32%  --Cont. DAPT for recent CORNELIA  --s/p Tamiflu x 5 days  --NST noted above plan for CATH when flu resolves & pt medically optimized  family and patient aware/ agree with plan   --NPO after midnight for LHC in AM if stable    [facility]  [address]   [place] [address]  office [phone]  pager [phone]

## 2018-02-06 LAB
BUN SERPL-MCNC: 33 MG/DL — HIGH (ref 7–23)
CALCIUM SERPL-MCNC: 9 MG/DL — SIGNIFICANT CHANGE UP (ref 8.4–10.5)
CHLORIDE SERPL-SCNC: 103 MMOL/L — SIGNIFICANT CHANGE UP (ref 98–107)
CO2 SERPL-SCNC: 22 MMOL/L — SIGNIFICANT CHANGE UP (ref 22–31)
CREAT SERPL-MCNC: 1.66 MG/DL — HIGH (ref 0.5–1.3)
GLUCOSE BLDC GLUCOMTR-MCNC: 140 MG/DL — HIGH (ref 70–99)
GLUCOSE BLDC GLUCOMTR-MCNC: 141 MG/DL — HIGH (ref 70–99)
GLUCOSE BLDC GLUCOMTR-MCNC: 147 MG/DL — HIGH (ref 70–99)
GLUCOSE BLDC GLUCOMTR-MCNC: 159 MG/DL — HIGH (ref 70–99)
GLUCOSE BLDC GLUCOMTR-MCNC: 91 MG/DL — SIGNIFICANT CHANGE UP (ref 70–99)
GLUCOSE SERPL-MCNC: 168 MG/DL — HIGH (ref 70–99)
HCT VFR BLD CALC: 31.1 % — LOW (ref 34.5–45)
HGB BLD-MCNC: 9.9 G/DL — LOW (ref 11.5–15.5)
MAGNESIUM SERPL-MCNC: 2 MG/DL — SIGNIFICANT CHANGE UP (ref 1.6–2.6)
MCHC RBC-ENTMCNC: 27.4 PG — SIGNIFICANT CHANGE UP (ref 27–34)
MCHC RBC-ENTMCNC: 31.8 % — LOW (ref 32–36)
MCV RBC AUTO: 86.1 FL — SIGNIFICANT CHANGE UP (ref 80–100)
NRBC # FLD: 0 — SIGNIFICANT CHANGE UP
PLATELET # BLD AUTO: 312 K/UL — SIGNIFICANT CHANGE UP (ref 150–400)
PMV BLD: 9.2 FL — SIGNIFICANT CHANGE UP (ref 7–13)
POTASSIUM SERPL-MCNC: 4.4 MMOL/L — SIGNIFICANT CHANGE UP (ref 3.5–5.3)
POTASSIUM SERPL-SCNC: 4.4 MMOL/L — SIGNIFICANT CHANGE UP (ref 3.5–5.3)
RBC # BLD: 3.61 M/UL — LOW (ref 3.8–5.2)
RBC # FLD: 15.6 % — HIGH (ref 10.3–14.5)
SODIUM SERPL-SCNC: 142 MMOL/L — SIGNIFICANT CHANGE UP (ref 135–145)
WBC # BLD: 8.79 K/UL — SIGNIFICANT CHANGE UP (ref 3.8–10.5)
WBC # FLD AUTO: 8.79 K/UL — SIGNIFICANT CHANGE UP (ref 3.8–10.5)

## 2018-02-06 PROCEDURE — 93459 L HRT ART/GRFT ANGIO: CPT | Mod: 26,GC

## 2018-02-06 PROCEDURE — 76937 US GUIDE VASCULAR ACCESS: CPT | Mod: 26,GC

## 2018-02-06 RX ADMIN — HEPARIN SODIUM 5000 UNIT(S): 5000 INJECTION INTRAVENOUS; SUBCUTANEOUS at 05:22

## 2018-02-06 RX ADMIN — Medication 650 MILLIGRAM(S): at 12:29

## 2018-02-06 RX ADMIN — SODIUM CHLORIDE 3 MILLILITER(S): 9 INJECTION INTRAMUSCULAR; INTRAVENOUS; SUBCUTANEOUS at 05:18

## 2018-02-06 RX ADMIN — Medication 40 MILLIGRAM(S): at 05:22

## 2018-02-06 RX ADMIN — INSULIN GLARGINE 20 UNIT(S): 100 INJECTION, SOLUTION SUBCUTANEOUS at 12:29

## 2018-02-06 RX ADMIN — Medication 81 MILLIGRAM(S): at 12:29

## 2018-02-06 RX ADMIN — Medication 650 MILLIGRAM(S): at 05:21

## 2018-02-06 RX ADMIN — SODIUM CHLORIDE 3 MILLILITER(S): 9 INJECTION INTRAMUSCULAR; INTRAVENOUS; SUBCUTANEOUS at 12:44

## 2018-02-06 RX ADMIN — MONTELUKAST 10 MILLIGRAM(S): 4 TABLET, CHEWABLE ORAL at 21:49

## 2018-02-06 RX ADMIN — TICAGRELOR 90 MILLIGRAM(S): 90 TABLET ORAL at 18:28

## 2018-02-06 RX ADMIN — ATORVASTATIN CALCIUM 40 MILLIGRAM(S): 80 TABLET, FILM COATED ORAL at 21:49

## 2018-02-06 RX ADMIN — Medication 50 MICROGRAM(S): at 05:22

## 2018-02-06 RX ADMIN — Medication 650 MILLIGRAM(S): at 21:49

## 2018-02-06 RX ADMIN — Medication 325 MILLIGRAM(S): at 12:29

## 2018-02-06 RX ADMIN — SODIUM CHLORIDE 3 MILLILITER(S): 9 INJECTION INTRAMUSCULAR; INTRAVENOUS; SUBCUTANEOUS at 21:50

## 2018-02-06 RX ADMIN — TICAGRELOR 90 MILLIGRAM(S): 90 TABLET ORAL at 05:21

## 2018-02-06 RX ADMIN — Medication 24 UNIT(S): at 19:50

## 2018-02-06 RX ADMIN — Medication 2: at 10:04

## 2018-02-06 RX ADMIN — FAMOTIDINE 20 MILLIGRAM(S): 10 INJECTION INTRAVENOUS at 12:29

## 2018-02-06 RX ADMIN — INSULIN GLARGINE 68 UNIT(S): 100 INJECTION, SOLUTION SUBCUTANEOUS at 21:49

## 2018-02-06 RX ADMIN — Medication 12.5 MILLIGRAM(S): at 05:22

## 2018-02-06 NOTE — PROGRESS NOTE ADULT - SUBJECTIVE AND OBJECTIVE BOX
Patient is a 72y old  Female who presents with a chief complaint of SOB (25 Jan 2018 15:14)      Any change in ROS: doing ok   no SOB   for cath today     MEDICATIONS  (STANDING):  aspirin enteric coated 81 milliGRAM(s) Oral daily  atorvastatin 40 milliGRAM(s) Oral at bedtime  dextrose 5%. 1000 milliLiter(s) (50 mL/Hr) IV Continuous <Continuous>  dextrose 50% Injectable 12.5 Gram(s) IV Push once  dextrose 50% Injectable 25 Gram(s) IV Push once  dextrose 50% Injectable 25 Gram(s) IV Push once  famotidine    Tablet 20 milliGRAM(s) Oral daily  ferrous    sulfate 325 milliGRAM(s) Oral daily  furosemide    Tablet 40 milliGRAM(s) Oral daily  heparin  Injectable 5000 Unit(s) SubCutaneous every 12 hours  insulin glargine Injectable (LANTUS) 20 Unit(s) SubCutaneous every morning  insulin glargine Injectable (LANTUS) 68 Unit(s) SubCutaneous at bedtime  insulin lispro (HumaLOG) corrective regimen sliding scale   SubCutaneous three times a day before meals  insulin lispro (HumaLOG) corrective regimen sliding scale   SubCutaneous at bedtime  insulin lispro Injectable (HumaLOG) 24 Unit(s) SubCutaneous three times a day before meals  levothyroxine 50 MICROGram(s) Oral daily  metoprolol succinate ER 12.5 milliGRAM(s) Oral daily  montelukast 10 milliGRAM(s) Oral at bedtime  sodium bicarbonate 650 milliGRAM(s) Oral three times a day  sodium chloride 0.9% lock flush 3 milliLiter(s) IV Push every 8 hours  ticagrelor 90 milliGRAM(s) Oral two times a day    MEDICATIONS  (PRN):  acetaminophen   Tablet 650 milliGRAM(s) Oral every 6 hours PRN For Temp greater than 38 C (100.4 F)  acetaminophen   Tablet. 650 milliGRAM(s) Oral every 6 hours PRN Mild, moderate and severe pain  benzocaine 15 mG/menthol 3.6 mG Lozenge 1 Lozenge Oral every 6 hours PRN Sore Throat  dextrose Gel 1 Dose(s) Oral once PRN Blood Glucose LESS THAN 70 milliGRAM(s)/deciliter  glucagon  Injectable 1 milliGRAM(s) IntraMuscular once PRN Glucose LESS THAN 70 milligrams/deciliter  guaiFENesin    Syrup 100 milliGRAM(s) Oral every 6 hours PRN Cough  sodium chloride 0.65% Nasal 1 Spray(s) Both Nostrils three times a day PRN Nasal Congestion    Vital Signs Last 24 Hrs  T(C): 36.7 (06 Feb 2018 05:16), Max: 36.7 (06 Feb 2018 05:16)  T(F): 98.1 (06 Feb 2018 05:16), Max: 98.1 (06 Feb 2018 05:16)  HR: 88 (06 Feb 2018 05:16) (83 - 88)  BP: 121/61 (06 Feb 2018 05:16) (114/56 - 121/61)  BP(mean): --  RR: 18 (06 Feb 2018 05:16) (18 - 18)  SpO2: 99% (06 Feb 2018 05:16) (99% - 100%)    I&O's Summary    05 Feb 2018 07:01  -  06 Feb 2018 07:00  --------------------------------------------------------  IN: 120 mL / OUT: 0 mL / NET: 120 mL    06 Feb 2018 07:01  -  06 Feb 2018 14:15  --------------------------------------------------------  IN: 0 mL / OUT: 0 mL / NET: 0 mL          Physical Exam:   GENERAL: NAD, well-groomed, well-developed  HEENT: MOHSEN/   Atraumatic, Normocephalic  ENMT: No tonsillar erythema, exudates, or enlargement; Moist mucous membranes, Good dentition, No lesions  NECK: Supple, No JVD, Normal thyroid  CHEST/LUNG: Clear to auscultaion, ; No rales, rhonchi, wheezing, or rubs  CVS: Regular rate and rhythm; No murmurs, rubs, or gallops  GI: : Soft, Nontender, Nondistended; Bowel sounds present  NERVOUS SYSTEM:  Alert & Oriented X3  EXTREMITIES:  2+ Peripheral Pulses, No clubbing, cyanosis, or edema  LYMPH: No lymphadenopathy noted  SKIN: No rashes or lesions  ENDOCRINOLOGY: No Thyromegaly  PSYCH: Appropriate    Labs:                              9.9    8.79  )-----------( 312      ( 06 Feb 2018 07:25 )             31.1                         9.4    9.24  )-----------( 278      ( 05 Feb 2018 05:32 )             29.2                         9.8    8.00  )-----------( 286      ( 04 Feb 2018 07:10 )             31.0                         10.0   8.05  )-----------( 298      ( 03 Feb 2018 07:40 )             32.3     02-06    142  |  103  |  33<H>  ----------------------------<  168<H>  4.4   |  22  |  1.66<H>  02-05    142  |  102  |  29<H>  ----------------------------<  110<H>  3.4<L>   |  25  |  1.60<H>  02-04    139  |  100  |  30<H>  ----------------------------<  255<H>  3.8   |  24  |  1.64<H>  02-03    140  |  101  |  31<H>  ----------------------------<  220<H>  3.7   |  23  |  1.52<H>    Ca    9.0      06 Feb 2018 07:25  Ca    8.7      05 Feb 2018 05:32  Mg     2.0     02-06      CAPILLARY BLOOD GLUCOSE      POCT Blood Glucose.: 147 mg/dL (06 Feb 2018 12:26)  POCT Blood Glucose.: 159 mg/dL (06 Feb 2018 09:29)  POCT Blood Glucose.: 350 mg/dL (05 Feb 2018 21:29)  POCT Blood Glucose.: 232 mg/dL (05 Feb 2018 17:20)            Cultures:           Wound culture:                01-30 @ 15:33  Organism --  Culture w/ gram stain --  Specimen Source BLOOD VENOUS      Abscess culture:             01-30 @ 15:33  Organism --  Gram Stain --  Specimen Source BLOOD VENOUS        Tissue culture:           01-30 @ 15:33  Organism --  Gram Stain --  Specimen Source BLOOD VENOUS      Body Fluid Smear & Culture:                        01-30 @ 15:33  AFB Smear  --  Culture Acid Fast Body Fluid w/ Smear  --  Culture Acid Fast Smear Concentrated   --    Culture Results:     --  Specimen Source BLOOD VENOUS        Rapid Respiratory Viral Panel Result        01-31 @ 20:56  Rapid RVP --  Coronovirus --  Adenovirus NOT DETECTED  Bordetella Pertussis NOT DETECTED  Chlamydia Pneumonia NOT DETECTED  Entero/RhinovirusNOT DETECTED  HKU1 Coronovirus --  HMPV Coronovirus NOT DETECTED  Influenza A --  Influenza AH1 NOT DETECTED  Influenza AH1 2009 NOT DETECTED  Influenza AH3 POSITIVE  Influenza B NOT DETECTED  Mycoplasma Pneumoniae NOT DETECTED This nucleic acid amplification assay was performed using  the [employer] Film[employer]. The following pathogens are tested  for: Adenovirus, Coronavirus 229E, Coronavirus HKU1,  Coronavirus NL63, Coronavirus OC43, Human Metapneumovirus  (HMPV), Rhinovirus/Enterovirus, Influenza A H1, Influenza A  H1 2009 (Pandemic H1 2009), Influenza A H3, Influenza A (Flu  A) subtype not identified, Influenza B, Parainfluenza Virus  (types 1, 2, 3, 4), Respiratory Syncytial Virus (RSV),  Bordetella pertussis, Chlamydophila pneumoniae, and  Mycoplasma pneumoniae. A negative FilmArray result does not  always exclude the possibility of viral or bacterial  infection. Laboratory results should always be interpreted  in the context of clinical findings.  NL63 Coronovirus --  OC43 Coronovirus --  Parainfluenza 1 NOT DETECTED  Parainfluenza 2 NOT DETECTED  Parainfluenza 3 NOT DETECTED  Parainfluenza 4 NOT DETECTED  Resp Syncytial Virus NOT DETECTED          Studies  Chest X-RAY  CT SCAN Chest   Venous Dopplers: LE:   CT Abdomen  Others    < from: Xray Chest 1 View- PORTABLE-Routine (02.01.18 @ 08:05) >  Heart size and the mediastinum cannot be accurately evaluated on this   projection. Coronary artery calcification and/or stent is seen.  Median sternotomy sutures and surgical clips are again seen.  There is minimal linear atelectasis versus scar in the left lower lung.   The lungs are otherwise clear.  No pleural effusion is noted.  The visualized bony structures appear intact.            IMPRESSION:  Minimal linear atelectasis versus scar in the left lower   lung. Otherwise clear lungs.   Please see report of the CT scan of the chest for any further detail.            < from: CT Chest No Cont (01.31.18 @ 17:59) >  lass opacities within bothlungs are no longer present. Previously   noted bilateral pleural effusions have resolved.  There is no pneumothorax.  VESSELS: Thoracic aorta is normal in caliber. The aorta and coronary   arteries are calcified.  HEART: Cardiomegaly. There is no pericardial effusion.   MEDIASTINUM: No significant mediastinal or hilar adenopathy is seen.   NECK AND CHEST WALL: The visualized thyroid is homogeneous. There is no   significant supraclavicular or axillary lymphadenopathy.  UPPER ABDOMEN: The visualized upper abdomen is unremarkable.  BONES: Status post median sternotomy.    IMPRESSION:  Resolution of the previously noted bilateral pleural effusions and   groundglass opacities within both lungs when compared to previous exam.              KENDALL GRANT M.D., RADIOLOGY RESIDENT  This document has been electronically signed.  IRVING MERCEDES M.D., ATTENDING RADIOLOGIST  This document has been electronically signed. Feb 1 2018 12:23PM        < end of copied text >        CATRACHITA KEENAN M.D., ATTENDING RADIOLOGIST  This document has been electronically signed. Feb 1 2018 12:00PM    < end of copied text >

## 2018-02-06 NOTE — PROGRESS NOTE ADULT - ASSESSMENT
1.	CHF decompensation, EF 32%  2.	MERVIN: renal function fluctuating likely sec. to CHF. hyperglycemia. On lasix, FEurea<35%. But still within baseline  3.	CKD 3, baseline 1.5-1.8  4.	CAD. cardio following  5.	Acidosis improved   6.	Hypokalemia  7.	fever +flu on Tamiflu   8.	anemia: iron sat 10%    PLAN:   1.	Continue diuretic   2.	Monitor BMP.  3.	f/u cardio  4.	Cath today, pt was not papered monitor BMP closely post cath 1.	CHF decompensation, EF 32%  2.	MERVIN: renal function fluctuating likely sec. to CHF. hyperglycemia. On lasix, FEurea<35%. But still within baseline  3.	CKD 3, baseline 1.5-1.8  4.	CAD. cardio following  5.	Acidosis improved   6.	Hypokalemia  7.	fever +flu on Tamiflu   8.	anemia: iron sat 10%    PLAN:   1.	Continue diuretic   2.	Monitor BMP.  3.	f/u cardio  4.	Cath today, monitor BMP closely post cath

## 2018-02-06 NOTE — PROGRESS NOTE ADULT - SUBJECTIVE AND OBJECTIVE BOX
Subjective:  no CP or SOB    MEDICATIONS  (STANDING):  aspirin enteric coated 81 milliGRAM(s) Oral daily  atorvastatin 40 milliGRAM(s) Oral at bedtime  famotidine    Tablet 20 milliGRAM(s) Oral daily  ferrous    sulfate 325 milliGRAM(s) Oral daily  furosemide    Tablet 40 milliGRAM(s) Oral daily  heparin  Injectable 5000 Unit(s) SubCutaneous every 12 hours  insulin glargine Injectable (LANTUS) 20 Unit(s) SubCutaneous every morning  insulin glargine Injectable (LANTUS) 68 Unit(s) SubCutaneous at bedtime  insulin lispro (HumaLOG) corrective regimen sliding scale   SubCutaneous three times a day before meals  insulin lispro (HumaLOG) corrective regimen sliding scale   SubCutaneous at bedtime  insulin lispro Injectable (HumaLOG) 24 Unit(s) SubCutaneous three times a day before meals  levothyroxine 50 MICROGram(s) Oral daily  metoprolol succinate ER 12.5 milliGRAM(s) Oral daily  montelukast 10 milliGRAM(s) Oral at bedtime  sodium bicarbonate 650 milliGRAM(s) Oral three times a day  sodium chloride 0.9% lock flush 3 milliLiter(s) IV Push every 8 hours  ticagrelor 90 milliGRAM(s) Oral two times a day    LABS:                        9.9    8.79  )-----------( 312      ( 06 Feb 2018 07:25 )             31.1     142  |  103  |  33<H>  ----------------------------<  168<H>  4.4   |  22  |  1.66<H>    Ca    9.0      06 Feb 2018 07:25  Mg     2.0     02-06    Creatinine Trend: 1.66<--, 1.60<--, 1.64<--, 1.52<--, 1.70<--, 1.61<--     PHYSICAL EXAM  Vital Signs Last 24 Hrs  T(C): 36.7 (06 Feb 2018 05:16), Max: 36.7 (06 Feb 2018 05:16)  T(F): 98.1 (06 Feb 2018 05:16), Max: 98.1 (06 Feb 2018 05:16)  HR: 88 (06 Feb 2018 05:16) (83 - 88)  BP: 121/61 (06 Feb 2018 05:16) (114/56 - 121/61)  RR: 18 (06 Feb 2018 05:16) (18 - 18)  SpO2: 99% (06 Feb 2018 05:16) (99% - 100%)    Cardiovascular: S1S2 RRR  Respiratory: CTA BL   Gastrointestinal:  Soft, Non-tender, + BS	  Extremities No edema, cyanosis or clubbing  B/L LE's     DIAGNOSTIC DATA:     TELEMETRY: SR 	       < from: Cardiac Cath Lab - Adult (10.13.17 @ 10:40) >  VENTRICLES: No left ventriculogram was performed.  CORONARY VESSELS: The coronary circulation is right dominant.  LM:   --  LM: Normal.  LAD:   --  Proximal LAD: There was a diffuse 60 % stenosis.  --  Mid LAD: There was a 100 % stenosis with the distal vessel being filled  via LIMA-LAD.  CX:   --  Circumflex: The vessel was small sized. Angiography showed mild  atherosclerosis withno flow limiting lesions.  --  OM1: Angiography showed mild atherosclerosis with no flow limiting  lesions.  RI:   --  Ostial ramus intermedius: There was a tubular 80 % stenosis.  There was PARUL grade 3 flow through the vessel (brisk flow).  RCA:   --  Proximal RCA: Angiography showed mild atherosclerosis with no  flow limiting lesions.  --  Mid RCA: There was a 100 % stenosis with the distal vessel being filled  via collaterals. This lesion is a chronic total occlusion.  GRAFTS:   --  Graft to the LAD: The graft was a LIMA. Graft angiography  showed minor luminal irregularities. Distal vessel angiography showed  minor luminal irregularities.  AORTA: Ascending aorta: Normal. No additional grafts visualized in  aortogram.  COMPLICATIONS: There were no complications.  DIAGNOSTIC RECOMMENDATIONS: Coronary angiogram demonstrates patent  LIMA-LAD, closed SVG grafts, and a severe stenosis in the ostial Ramus.  Will perform staged PCI to RI when Cr stable and renally optimized.  Prepared and signed by  Corie Ga M.D.  Signed 10/17/2017 13:49:15    < end of copied text >    < from: Cardiac Cath Lab - Adult (11.17.17 @ 11:48) >  PROCEDURE:  --  Intervention on ramus intermedius: drug-eluting stent.    VENTRICLES: No LV gram was performed; however, a recent echocardiogram  demonstrated normal global and regional LV function.  CORONARY VESSELS: The coronary circulation is right dominant.  LM:   --  LM: Angiography showed minor luminal irregularities with no flow  limiting lesions.  LAD:   --  Ostial LAD: There was a 100 % stenosis.  CX:   --  Circumflex: This vessel was not injected, but was visualized  during a prior cardiac catheterization.  RI:   --  Ramus intermedius: There was a 95 % stenosis.  RCA:   --  RCA: This vessel was not injected.  COMPLICATIONS: There were no complications.  DIAGNOSTIC RECOMMENDATIONS: The patient should continue with the present  medications. will add plavix 75mg po once a day/ will add ECASA 325mg po  once day.  Prepared and signed by  Roberta Quick M.D.  Signed 11/17/2017 13:16:01    < end of copied text >    < from: TTE with Doppler (w/Cont) (11.25.17 @ 12:33) >  CONCLUSIONS:  1. Mitral annular calcification, otherwise normal mitral  valve. Mild mitral regurgitation.  2. Normal left ventricular internal dimensions and wall  thicknesses.  3. Endocardium not well visualized; grossly moderate to  severe segmental left ventricularsystolic dysfunction.  Hypokinesis of the inferior, lateral, and inferolateral  walls.  Endocardial visualization enhanced with intravenous  injection of echo contrast (Definity).  No LV thrombus  seen.  4. The right ventricle is not well visualized;grossly  normal right ventricular systolic function.  ------------------------------------------------------------------------  Confirmed on  11/25/2017 - 14:44:41 by Jose Lucia M.D.    < end of copied text >    < from: Cardiac Cath Lab - Adult (12.05.17 @ 12:48) >  VENTRICLES: No LV gram was performed; however, a recent echocardiogram  demonstrated an EF of 36 %.  CORONARY VESSELS: The coronary circulation is right dominant.  LM:   --  Distal left main: Angiography showed minor luminal irregularities  with no flow limiting lesions.  LAD:   --  Mid LAD: There was a 100 % stenosis with the distal vessel being  filled via LIMA-LAD.  CX:   --  Proximal circumflex: There was a tubular 20 % stenosis.  --  Mid circumflex: Angiography showed minor luminal irregularities with no  flow limiting lesions.  --  Distal circumflex: Angiography showed minor luminal irregularities with  no flow limiting lesions.  --  OM1: The vessel was small sized. There was a discrete 60 % stenosis.  RI:   --  Ostial ramus intermedius: Angiography showed minor luminal  irregularities with no flow limiting lesions. There was no significant  restenosis in RI stent.  --  Proximal ramus intermedius: Angiography showed minor luminal  irregularities with no flow limiting lesions.  --  Mid ramus intermedius: There was a tubular 80 % stenosis. The lesion  was associated with a small filling defect consistent with thrombus.  RCA:   --  Mid RCA: There was a 100 % stenosis with the distal vessel being  filled via collaterals from the LAD.  GRAFTS:   --  Graft to the LAD: The graft was a LIMA. Graft angiography  showed minor luminal irregularities. Distal vessel angiography showed  minor luminalirregularities.  COMPLICATIONS: There were no complications.  DIAGNOSTIC RECOMMENDATIONS: Coronary angiogram demonstrates a severe  stenosis in the ramus intermedius that is the cause of the patient's  clinical presentation. Will therefore perform PCI to the RI.  INTERVENTIONAL RECOMMENDATIONS: S/p successful CORNELIA to the Ramus  Intermedius. The patient should continue with ASA and Brilinta for at  least 12 months.  Prepared and signed by  Corie Ga M.D.  Signed 12/06/2017 17:06:30    < from: CT Chest No Cont (01.20.18 @ 09:55) >  IMPRESSION:   Mild pulmonary edema with small simple bilateral pleural effusions, right   greater than left, with no evidence of loculation.    < end of copied text >    < from: TTE with Doppler (w/Cont) (01.23.18 @ 12:31) >  CONCLUSIONS:  1. Mitral annular calcification, otherwise normal mitral  valve. Mild-moderate mitral regurgitation.  2. Calcified trileaflet aortic valve with normal opening.  Mild aortic regurgitation.  3. Normal left ventricular internal dimensions and wall  thicknesses.  4. Endocardium not well visualized; grossly severe global  left ventricular systolic dysfunction.  Endocardial  visualization enhanced with intravenous injection of echo  contrast (Definity).  5. The right ventricle is not well visualized; grossly  normal right ventricular systolic function.  *** Compared with echocardiogram of 11/25/2017, no  significant changes noted.  ------------------------------------------------------------------------  Confirmed on  1/23/2018 - 14:35:49 by Jose Lucia M.D.    < end of copied text >    < from: Nuclear Stress Test-Pharmacologic (01.28.18 @ 12:31) >  IMPRESSIONS:Abnormal Study  * Myocardial Perfusion SPECT results are abnormal.  * There is a medium sized, mild to moderate defect in  anterior wall that is reversible, suggestive of  ischemia.There are large, moderate to severe defects in  inferolateral, lateral walls that are fixed, suggestive of  infarct.  * Post-stress gated wall motion analysis was performed  (LVEF = 33 %;LVEDV = 116 ml.), revealing severe overall  hypokinesis. There was focal inferiolateral akinesis and  severe lateral hypokinesis with absence of systolic  thickening. The best motion was in the septum.  *** Compared with Nuclear/Stress test of 11/5/2014, the  observed defect are now more extensive, suggesting  progression of disease. Prior LVEF was 39% with EDV 77 mL.  ------------------------------------------------------------------------  Revised on  1/29/2018 - 16:44:19 by Lorenzo Covarrubias M.D.  Confirmed on  1/30/2018 - 15:45:44 by José Hansen M.D.  < end of copied text >    < from: CT Chest No Cont (01.31.18 @ 17:59) >  IMPRESSION:  Resolution of the previously noted bilateral pleural effusions and   groundglass opacities within both lungs when compared to previous exam.    < end of copied text >    ASSESSMENT/PLAN: 	72y Female w/ PMH HTN, CKD, Hyperlipidemia, DM-II, CAD s/p 3V CABG  (LIMA to LAD, SVG to OM, SVG to PDA) at [facility] 2014, s/p CORNELIA TO RI 11/17/17, NSTEMI 12/2017 s/p LHC on 12/5 and CORNELIA to [facility], TTE at that time revealed grossly moderate to severe LV dysfunction with hypokinesis of the inferior, lateral and inferolateral with an EF of 36% admitted with acute on chronic systolic CHF, s/p IV diureses, s/p NST as above, now s/p Flu and course of Tamiflu     --Not in decomp CHF  --Given ongoing chest pain and NST as noted above, plan for [facility] today      Juliane Fitch PA-C  [facility]  [address], [address]   [place] [address]  office [phone]  pager [phone]

## 2018-02-06 NOTE — PROGRESS NOTE ADULT - SUBJECTIVE AND OBJECTIVE BOX
Dr. Muhammad (Nephrology)  Office [phone]  Cell [phone]  [facility]  Cell [phone]      Patient is a 72y old  Female who presents with a chief complaint of SOB (25 Jan 2018 15:14)      Patient seen and examined at bedside. No chest pain/sob    VITALS:  T(F): 98.1 (02-06-18 @ 05:16), Max: 98.1 (02-06-18 @ 05:16)  HR: 88 (02-06-18 @ 05:16)  BP: 121/61 (02-06-18 @ 05:16)  RR: 18 (02-06-18 @ 05:16)  SpO2: 99% (02-06-18 @ 05:16)  Wt(kg): --    02-05 @ 07:01  -  02-06 @ 07:00  --------------------------------------------------------  IN: 120 mL / OUT: 0 mL / NET: 120 mL    02-06 @ 07:01  -  02-06 @ 14:05  --------------------------------------------------------  IN: 0 mL / OUT: 0 mL / NET: 0 mL          PHYSICAL EXAM:  Constitutional: NAD  Neck: No JVD  Respiratory: CTAB, no wheezes, rales or rhonchi  Cardiovascular: S1, S2, RRR  Gastrointestinal: BS+, soft, NT/ND  Extremities: No peripheral edema    Hospital Medications:   MEDICATIONS  (STANDING):  aspirin enteric coated 81 milliGRAM(s) Oral daily  atorvastatin 40 milliGRAM(s) Oral at bedtime  dextrose 5%. 1000 milliLiter(s) (50 mL/Hr) IV Continuous <Continuous>  dextrose 50% Injectable 12.5 Gram(s) IV Push once  dextrose 50% Injectable 25 Gram(s) IV Push once  dextrose 50% Injectable 25 Gram(s) IV Push once  famotidine    Tablet 20 milliGRAM(s) Oral daily  ferrous    sulfate 325 milliGRAM(s) Oral daily  furosemide    Tablet 40 milliGRAM(s) Oral daily  heparin  Injectable 5000 Unit(s) SubCutaneous every 12 hours  insulin glargine Injectable (LANTUS) 20 Unit(s) SubCutaneous every morning  insulin glargine Injectable (LANTUS) 68 Unit(s) SubCutaneous at bedtime  insulin lispro (HumaLOG) corrective regimen sliding scale   SubCutaneous three times a day before meals  insulin lispro (HumaLOG) corrective regimen sliding scale   SubCutaneous at bedtime  insulin lispro Injectable (HumaLOG) 24 Unit(s) SubCutaneous three times a day before meals  levothyroxine 50 MICROGram(s) Oral daily  metoprolol succinate ER 12.5 milliGRAM(s) Oral daily  montelukast 10 milliGRAM(s) Oral at bedtime  sodium bicarbonate 650 milliGRAM(s) Oral three times a day  sodium chloride 0.9% lock flush 3 milliLiter(s) IV Push every 8 hours  ticagrelor 90 milliGRAM(s) Oral two times a day      LABS:  02-06    142  |  103  |  33<H>  ----------------------------<  168<H>  4.4   |  22  |  1.66<H>    Ca    9.0      06 Feb 2018 07:25  Mg     2.0     02-06      Creatinine Trend: 1.66 <--, 1.60 <--, 1.64 <--, 1.52 <--, 1.70 <--, 1.61 <--, 1.56 <--                                9.9    8.79  )-----------( 312      ( 06 Feb 2018 07:25 )             31.1     Urine Studies:  Urinalysis - [01-30-18 @ 15:20]      Color PLYEL / Appearance CLEAR / SG 1.018 / pH 6.5      Gluc >1000 / Ketone NEGATIVE  / Bili NEGATIVE / Urobili NORMAL       Blood TRACE / Protein 150 / Leuk Est NEGATIVE / Nitrite NEGATIVE      RBC 0-2 / WBC 0-2 / Hyaline  / Gran  / Sq Epi OCC / Non Sq Epi  / Bacteria       Iron 40, TIBC 377, %sat --      [01-20-18 @ 06:06]  Ferritin 38.35      [01-20-18 @ 06:06]  HbA1c 9.1      [11-25-17 @ 06:30]  TSH 0.55      [01-19-18 @ 06:45]  Lipid: chol 130, , HDL 30, LDL 70      [01-19-18 @ 06:45]        RADIOLOGY & ADDITIONAL STUDIES:

## 2018-02-06 NOTE — PROGRESS NOTE ADULT - SUBJECTIVE AND OBJECTIVE BOX
Chief complaint  Patient is a 72y old  Female who presents with a chief complaint of SOB (25 Jan 2018 15:14)   Review of systems  Patient in bed, looks comfortable, no fever, no hypoglycemia.    Labs and Fingersticks  CAPILLARY BLOOD GLUCOSE      POCT Blood Glucose.: 91 mg/dL (06 Feb 2018 17:18)  POCT Blood Glucose.: 147 mg/dL (06 Feb 2018 12:26)  POCT Blood Glucose.: 159 mg/dL (06 Feb 2018 09:29)  POCT Blood Glucose.: 350 mg/dL (05 Feb 2018 21:29)          Calcium, Total Serum: 9.0 (02-06 @ 07:25)  Calcium, Total Serum: 8.7 (02-05 @ 05:32)          02-06    142  |  103  |  33<H>  ----------------------------<  168<H>  4.4   |  22  |  1.66<H>    Ca    9.0      06 Feb 2018 07:25  Mg     2.0     02-06                          9.9    8.79  )-----------( 312      ( 06 Feb 2018 07:25 )             31.1     Medications  MEDICATIONS  (STANDING):  aspirin enteric coated 81 milliGRAM(s) Oral daily  atorvastatin 40 milliGRAM(s) Oral at bedtime  dextrose 5%. 1000 milliLiter(s) (50 mL/Hr) IV Continuous <Continuous>  dextrose 50% Injectable 12.5 Gram(s) IV Push once  dextrose 50% Injectable 25 Gram(s) IV Push once  dextrose 50% Injectable 25 Gram(s) IV Push once  famotidine    Tablet 20 milliGRAM(s) Oral daily  ferrous    sulfate 325 milliGRAM(s) Oral daily  furosemide    Tablet 40 milliGRAM(s) Oral daily  heparin  Injectable 5000 Unit(s) SubCutaneous every 12 hours  insulin glargine Injectable (LANTUS) 20 Unit(s) SubCutaneous every morning  insulin glargine Injectable (LANTUS) 68 Unit(s) SubCutaneous at bedtime  insulin lispro (HumaLOG) corrective regimen sliding scale   SubCutaneous three times a day before meals  insulin lispro (HumaLOG) corrective regimen sliding scale   SubCutaneous at bedtime  insulin lispro Injectable (HumaLOG) 24 Unit(s) SubCutaneous three times a day before meals  levothyroxine 50 MICROGram(s) Oral daily  metoprolol succinate ER 12.5 milliGRAM(s) Oral daily  montelukast 10 milliGRAM(s) Oral at bedtime  sodium bicarbonate 650 milliGRAM(s) Oral three times a day  sodium chloride 0.9% lock flush 3 milliLiter(s) IV Push every 8 hours  ticagrelor 90 milliGRAM(s) Oral two times a day      Physical Exam  General: Patient comfortable in bed  Vital Signs Last 12 Hrs  T(F): 98.1 (02-06-18 @ 18:37), Max: 98.1 (02-06-18 @ 18:37)  HR: 85 (02-06-18 @ 18:37) (85 - 85)  BP: 118/63 (02-06-18 @ 18:37) (118/63 - 118/63)  BP(mean): --  RR: 16 (02-06-18 @ 18:37) (16 - 16)  SpO2: 100% (02-06-18 @ 18:37) (100% - 100%)  Neck: No palpable thyroid nodules.  CVS: S1S2, No murmurs  Respiratory: No wheezing, no crepitations  GI: Abdomen soft, bowel sounds positive  Musculoskeletal:  edema lower extremities.   Skin: No skin rashes, no ecchymosis    Diagnostics

## 2018-02-06 NOTE — PROGRESS NOTE ADULT - ASSESSMENT
71 Female, with a PmHx of HTN, CKD, Hyperlipidemia, DM-II, CAD s/p CABG x 3, s/p stents, presenting to the [facility] ED c/o shortness of breath. Pt is being admitted to telemetry for acute on chronic systolic heart failure.  Now she is with new fever, as well as URI

## 2018-02-06 NOTE — CHART NOTE - NSCHARTNOTEFT_GEN_A_CORE
Source: Patient [X]    Family [X] daughter     other [X] Medical Chart     Pt 73 yo female appears alert, oriented. Pt NPO for Cardiac Cath today. Pt's daughter @ bed side participated in the interview. Per daughter Pt's appetite not well; Pt does not like the hospital food. At times family brings food from home reported. Food preferences discussed. Pt partially edentulous. RDN offered to mechanical soft food choices, but daughter declined. RDN answered questions/concerns related to diet. RDN remains available, Pt madea ware.     Diet rx: NPO after midnight   Patient reports [ ] nausea  [ ] vomiting [ ] diarrhea [ ] constipation  [ ]chewing problems [ ] swallowing issues  [ ] other:   PO intake:  < 50% [X] 50-75% [ ]   % [ ]  other :  Source for PO intake [X] Patient [X] family, daughter [ ] chart [ ] staff [ ] other    Enteral /Parenteral Nutrition: N/A  Current Weight: 125# (2/6), 130# (1/19)  % Weight Change    Pertinent Medications: Aspirin, Heparin, Fe-Sulfate, Lasix, Insulin (Humalog), Insulin (Lantus), Pepcid, Lipitor,   Pertinent Labs: (2/6) H/H 9.9/31.1 L, BUN 33 H, Creat 1.66 H, Glu 168 H;           (1/18) Albumin 3.9    Skin:   Estimated Needs:   [X] no change since previous assessment  [ ] recalculated:     Previous Nutrition Diagnosis:   [ ] Inadequate Energy Intake [ ]Inadequate Oral Intake [ ] Excessive Energy Intake   [ ] Underweight [ ] Increased Nutrient Needs [ ] Overweight/Obesity   [ ] Altered GI Function [ ] Unintended Weight Loss [ ] Food & Nutrition Related Knowledge Deficit [ ] Malnutrition  [X] Altered nutrition - related laboratory values (specify)     Nutrition Diagnosis is [X] ongoing  [ ] resolved [ ] not applicable   New Nutrition Diagnosis: [ ] not applicable  [ ] Inadequate Protein Energy Intake   [ ] Inadequate Oral Intake   [ ] Excessive Energy Intake   [ ] Underweight   [ ] Increased Nutrient Needs   [ ] Overweight/Obesity   [ ] Altered GI Function   [ ] Unintended Weight Loss   [ ] Food & Nutrition Related Knowledge Deficit  [ ] Limited Adherence to nutrition related recommendations   [ ] Malnutrition    [ ] other:     Recommendations:   1. Suggest: Once & when clinically indicated resume PO diet; Soft, Consistent Carbohydrate (no snacks), DASH/TLC (cholesterol and sodium restricted); Low Fat;       2. Encourage & assist Pt with meals; Monitor PO diet tolerance; Honor food preferences;            3. Monitor labs, weights, hydration status; Source: Patient [X]    Family [X] daughter     other [X] Medical Chart     Pt 73 yo female appears alert, oriented. Pt NPO for Cardiac Cath today. Pt's daughter @ bed side participated in the interview. Per daughter Pt's appetite not well; Pt does not like the hospital food. At times family brings food from home reported. Food preferences discussed. Pt partially edentulous. RDN offered to mechanical soft food choices, but daughter declined. RDN answered questions/concerns related to diet. RDN remains available, Pt madea ware.     Diet rx: NPO after midnight   Patient reports [ ] nausea  [ ] vomiting [ ] diarrhea [ ] constipation  [ ]chewing problems [ ] swallowing issues  [ ] other:   PO intake:  < 50% [X] 50-75% [ ]   % [ ]  other :  Source for PO intake [X] Patient [X] family, daughter [ ] chart [ ] staff [ ] other    Enteral /Parenteral Nutrition: N/A  Current Weight: 125# (2/6), 130# (1/19)  % Weight Change    Pertinent Medications: Aspirin, Heparin, Fe-Sulfate, Lasix, Insulin (Humalog), Insulin (Lantus), Pepcid, Lipitor,   Pertinent Labs: (2/6) H/H 9.9/31.1 L, BUN 33 H, Creat 1.66 H, Glu 168 H;           (1/18) Albumin 3.9    Skin:   Estimated Needs:   [X] no change since previous assessment  [ ] recalculated:     Previous Nutrition Diagnosis:   [ ] Inadequate Energy Intake [ ]Inadequate Oral Intake [ ] Excessive Energy Intake   [ ] Underweight [ ] Increased Nutrient Needs [ ] Overweight/Obesity   [ ] Altered GI Function [ ] Unintended Weight Loss [ ] Food & Nutrition Related Knowledge Deficit [ ] Malnutrition  [X] Altered nutrition - related laboratory values (specify)     Nutrition Diagnosis is [X] ongoing  [ ] resolved [ ] not applicable   New Nutrition Diagnosis: [ ] not applicable  [ ] Inadequate Protein Energy Intake   [ ] Inadequate Oral Intake   [ ] Excessive Energy Intake   [ ] Underweight   [ ] Increased Nutrient Needs   [ ] Overweight/Obesity   [ ] Altered GI Function   [ ] Unintended Weight Loss   [ ] Food & Nutrition Related Knowledge Deficit  [ ] Limited Adherence to nutrition related recommendations   [ ] Malnutrition    [ ] other:     Recommendations:   1. Suggest: Once & when clinically indicated resume PO diet; Soft, Consistent Carbohydrate (no snacks), DASH/TLC (cholesterol and sodium restricted); Low Fat; PO supplement: Glucerna Shake 8oz. 2x daily (will provide additional ~440kcals, 20g protein);     2. Once PO diet resumed, encourage & assist Pt with meals; Monitor PO diet tolerance; Honor food preferences;            3. Monitor labs, weights, hydration status; Source: Patient [X]    Family [X] daughter     other [X] Medical Chart     Pt 73 yo female appears alert, oriented. Pt NPO for Cardiac Cath today. Pt's daughter @ bed side participated in the interview. Per daughter Pt's appetite not well; Pt does not like the hospital food. At times family brings food from home reported. Food preferences discussed. Pt partially edentulous. RDN offered to mechanical soft food choices, but daughter declined. RDN answered questions/concerns related to diet. RDN remains available, Pt & her daughter made aware.     Diet rx: NPO after midnight   Patient reports [ ] nausea  [ ] vomiting [ ] diarrhea [ ] constipation  [ ]chewing problems [ ] swallowing issues  [ ] other:   PO intake:  < 50% [X] 50-75% [ ]   % [ ]  other :  Source for PO intake [X] Patient [X] family, daughter [ ] chart [ ] staff [ ] other    Enteral /Parenteral Nutrition: N/A  Current Weight: 125# (2/6), 130# (1/19)  % Weight Change    Pertinent Medications: Aspirin, Heparin, Fe-Sulfate, Lasix, Insulin (Humalog), Insulin (Lantus), Pepcid, Lipitor,   Pertinent Labs: (2/6) H/H 9.9/31.1 L, BUN 33 H, Creat 1.66 H, Glu 168 H;           (1/18) Albumin 3.9    Skin:   Estimated Needs:   [X] no change since previous assessment  [ ] recalculated:     Previous Nutrition Diagnosis:   [ ] Inadequate Energy Intake [ ]Inadequate Oral Intake [ ] Excessive Energy Intake   [ ] Underweight [ ] Increased Nutrient Needs [ ] Overweight/Obesity   [ ] Altered GI Function [ ] Unintended Weight Loss [ ] Food & Nutrition Related Knowledge Deficit [ ] Malnutrition  [X] Altered nutrition - related laboratory values (specify)     Nutrition Diagnosis is [X] ongoing  [ ] resolved [ ] not applicable   New Nutrition Diagnosis: [ ] not applicable  [X] Inadequate Energy Intake   [ ] Inadequate Oral Intake   [ ] Excessive Energy Intake   [ ] Underweight   [ ] Increased Nutrient Needs   [ ] Overweight/Obesity   [ ] Altered GI Function   [ ] Unintended Weight Loss   [ ] Food & Nutrition Related Knowledge Deficit  [ ] Limited Adherence to nutrition related recommendations   [ ] Malnutrition    [ ] other:     Related to decreased appetite/suboptimal food intake   As evidenced by Pt's PO intake not well reported; Pt NPO for procedure      Recommendations:   1. Suggest: Once & when clinically indicated resume PO diet; PO diet rx: Soft, Consistent Carbohydrate (no snacks), DASH/TLC (cholesterol and sodium restricted); Low Fat; PO supplement: Glucerna Shake 8oz. 2x daily (will provide additional ~440kcals, 20g protein);     2. Once PO diet resumed, encourage & assist Pt with meals; Monitor PO diet tolerance; Honor food preferences;            3. Suggest: Swallow Bedside Assessment Adult &/or MBS if needed to determine appropriate PO diet consistency;   4. Monitor labs, weights, hydration status;

## 2018-02-06 NOTE — PROVIDER CONTACT NOTE (OTHER) - SITUATION
PATIENT SPOKE TO RHEA MIJARES ABOUT [facility]ALTH [facility].
Pt is NPO for angiogram, full lantus dose is not advised to be given and needs to be adjusted by 20%
RVP came back positive for influenza A
pt c/o of chest tightness

## 2018-02-07 DIAGNOSIS — D72.829 ELEVATED WHITE BLOOD CELL COUNT, UNSPECIFIED: ICD-10-CM

## 2018-02-07 LAB
BUN SERPL-MCNC: 31 MG/DL — HIGH (ref 7–23)
CALCIUM SERPL-MCNC: 9 MG/DL — SIGNIFICANT CHANGE UP (ref 8.4–10.5)
CHLORIDE SERPL-SCNC: 101 MMOL/L — SIGNIFICANT CHANGE UP (ref 98–107)
CO2 SERPL-SCNC: 24 MMOL/L — SIGNIFICANT CHANGE UP (ref 22–31)
CREAT SERPL-MCNC: 1.81 MG/DL — HIGH (ref 0.5–1.3)
GLUCOSE BLDC GLUCOMTR-MCNC: 106 MG/DL — HIGH (ref 70–99)
GLUCOSE BLDC GLUCOMTR-MCNC: 111 MG/DL — HIGH (ref 70–99)
GLUCOSE BLDC GLUCOMTR-MCNC: 132 MG/DL — HIGH (ref 70–99)
GLUCOSE BLDC GLUCOMTR-MCNC: 286 MG/DL — HIGH (ref 70–99)
GLUCOSE SERPL-MCNC: 123 MG/DL — HIGH (ref 70–99)
HCT VFR BLD CALC: 35.6 % — SIGNIFICANT CHANGE UP (ref 34.5–45)
HGB BLD-MCNC: 11.3 G/DL — LOW (ref 11.5–15.5)
MAGNESIUM SERPL-MCNC: 2.2 MG/DL — SIGNIFICANT CHANGE UP (ref 1.6–2.6)
MCHC RBC-ENTMCNC: 27.4 PG — SIGNIFICANT CHANGE UP (ref 27–34)
MCHC RBC-ENTMCNC: 31.7 % — LOW (ref 32–36)
MCV RBC AUTO: 86.2 FL — SIGNIFICANT CHANGE UP (ref 80–100)
NRBC # FLD: 0 — SIGNIFICANT CHANGE UP
PLATELET # BLD AUTO: 361 K/UL — SIGNIFICANT CHANGE UP (ref 150–400)
PMV BLD: 9.2 FL — SIGNIFICANT CHANGE UP (ref 7–13)
POTASSIUM SERPL-MCNC: 3.8 MMOL/L — SIGNIFICANT CHANGE UP (ref 3.5–5.3)
POTASSIUM SERPL-SCNC: 3.8 MMOL/L — SIGNIFICANT CHANGE UP (ref 3.5–5.3)
RBC # BLD: 4.13 M/UL — SIGNIFICANT CHANGE UP (ref 3.8–5.2)
RBC # FLD: 15.9 % — HIGH (ref 10.3–14.5)
SODIUM SERPL-SCNC: 142 MMOL/L — SIGNIFICANT CHANGE UP (ref 135–145)
WBC # BLD: 11.14 K/UL — HIGH (ref 3.8–10.5)
WBC # FLD AUTO: 11.14 K/UL — HIGH (ref 3.8–10.5)

## 2018-02-07 PROCEDURE — 99232 SBSQ HOSP IP/OBS MODERATE 35: CPT

## 2018-02-07 RX ADMIN — Medication 6: at 13:06

## 2018-02-07 RX ADMIN — HEPARIN SODIUM 5000 UNIT(S): 5000 INJECTION INTRAVENOUS; SUBCUTANEOUS at 05:07

## 2018-02-07 RX ADMIN — Medication 40 MILLIGRAM(S): at 05:07

## 2018-02-07 RX ADMIN — Medication 650 MILLIGRAM(S): at 21:24

## 2018-02-07 RX ADMIN — Medication 650 MILLIGRAM(S): at 13:07

## 2018-02-07 RX ADMIN — FAMOTIDINE 20 MILLIGRAM(S): 10 INJECTION INTRAVENOUS at 13:05

## 2018-02-07 RX ADMIN — Medication 24 UNIT(S): at 18:06

## 2018-02-07 RX ADMIN — Medication 24 UNIT(S): at 13:06

## 2018-02-07 RX ADMIN — INSULIN GLARGINE 68 UNIT(S): 100 INJECTION, SOLUTION SUBCUTANEOUS at 22:32

## 2018-02-07 RX ADMIN — TICAGRELOR 90 MILLIGRAM(S): 90 TABLET ORAL at 17:04

## 2018-02-07 RX ADMIN — Medication 81 MILLIGRAM(S): at 13:05

## 2018-02-07 RX ADMIN — Medication 50 MICROGRAM(S): at 05:07

## 2018-02-07 RX ADMIN — Medication 650 MILLIGRAM(S): at 05:07

## 2018-02-07 RX ADMIN — MONTELUKAST 10 MILLIGRAM(S): 4 TABLET, CHEWABLE ORAL at 21:24

## 2018-02-07 RX ADMIN — TICAGRELOR 90 MILLIGRAM(S): 90 TABLET ORAL at 05:07

## 2018-02-07 RX ADMIN — Medication 325 MILLIGRAM(S): at 13:05

## 2018-02-07 RX ADMIN — SODIUM CHLORIDE 3 MILLILITER(S): 9 INJECTION INTRAMUSCULAR; INTRAVENOUS; SUBCUTANEOUS at 05:08

## 2018-02-07 RX ADMIN — HEPARIN SODIUM 5000 UNIT(S): 5000 INJECTION INTRAVENOUS; SUBCUTANEOUS at 17:02

## 2018-02-07 RX ADMIN — INSULIN GLARGINE 20 UNIT(S): 100 INJECTION, SOLUTION SUBCUTANEOUS at 09:30

## 2018-02-07 RX ADMIN — Medication 24 UNIT(S): at 09:31

## 2018-02-07 RX ADMIN — Medication 12.5 MILLIGRAM(S): at 05:07

## 2018-02-07 RX ADMIN — SODIUM CHLORIDE 3 MILLILITER(S): 9 INJECTION INTRAMUSCULAR; INTRAVENOUS; SUBCUTANEOUS at 13:08

## 2018-02-07 RX ADMIN — SODIUM CHLORIDE 3 MILLILITER(S): 9 INJECTION INTRAMUSCULAR; INTRAVENOUS; SUBCUTANEOUS at 21:24

## 2018-02-07 RX ADMIN — ATORVASTATIN CALCIUM 40 MILLIGRAM(S): 80 TABLET, FILM COATED ORAL at 21:24

## 2018-02-07 NOTE — PROGRESS NOTE ADULT - SUBJECTIVE AND OBJECTIVE BOX
Patient is a 72y old  Female who presents with a chief complaint of SOB (25 Jan 2018 15:14)      Any change in ROS: S/P Cath  doing ok   no SOB at rest:  off oxygen     MEDICATIONS  (STANDING):  aspirin enteric coated 81 milliGRAM(s) Oral daily  atorvastatin 40 milliGRAM(s) Oral at bedtime  dextrose 5%. 1000 milliLiter(s) (50 mL/Hr) IV Continuous <Continuous>  dextrose 50% Injectable 12.5 Gram(s) IV Push once  dextrose 50% Injectable 25 Gram(s) IV Push once  dextrose 50% Injectable 25 Gram(s) IV Push once  famotidine    Tablet 20 milliGRAM(s) Oral daily  ferrous    sulfate 325 milliGRAM(s) Oral daily  furosemide    Tablet 40 milliGRAM(s) Oral daily  heparin  Injectable 5000 Unit(s) SubCutaneous every 12 hours  insulin glargine Injectable (LANTUS) 20 Unit(s) SubCutaneous every morning  insulin glargine Injectable (LANTUS) 68 Unit(s) SubCutaneous at bedtime  insulin lispro (HumaLOG) corrective regimen sliding scale   SubCutaneous three times a day before meals  insulin lispro (HumaLOG) corrective regimen sliding scale   SubCutaneous at bedtime  insulin lispro Injectable (HumaLOG) 24 Unit(s) SubCutaneous three times a day before meals  levothyroxine 50 MICROGram(s) Oral daily  metoprolol succinate ER 12.5 milliGRAM(s) Oral daily  montelukast 10 milliGRAM(s) Oral at bedtime  sodium bicarbonate 650 milliGRAM(s) Oral three times a day  sodium chloride 0.9% lock flush 3 milliLiter(s) IV Push every 8 hours  ticagrelor 90 milliGRAM(s) Oral two times a day    MEDICATIONS  (PRN):  acetaminophen   Tablet 650 milliGRAM(s) Oral every 6 hours PRN For Temp greater than 38 C (100.4 F)  acetaminophen   Tablet. 650 milliGRAM(s) Oral every 6 hours PRN Mild, moderate and severe pain  benzocaine 15 mG/menthol 3.6 mG Lozenge 1 Lozenge Oral every 6 hours PRN Sore Throat  dextrose Gel 1 Dose(s) Oral once PRN Blood Glucose LESS THAN 70 milliGRAM(s)/deciliter  glucagon  Injectable 1 milliGRAM(s) IntraMuscular once PRN Glucose LESS THAN 70 milligrams/deciliter  guaiFENesin    Syrup 100 milliGRAM(s) Oral every 6 hours PRN Cough  sodium chloride 0.65% Nasal 1 Spray(s) Both Nostrils three times a day PRN Nasal Congestion    Vital Signs Last 24 Hrs  T(C): 36.7 (07 Feb 2018 05:06), Max: 36.7 (06 Feb 2018 18:37)  T(F): 98.1 (07 Feb 2018 05:06), Max: 98.1 (06 Feb 2018 18:37)  HR: 81 (07 Feb 2018 05:06) (81 - 85)  BP: 123/68 (07 Feb 2018 05:06) (115/69 - 123/68)  BP(mean): --  RR: 16 (07 Feb 2018 05:06) (16 - 18)  SpO2: 100% (07 Feb 2018 05:06) (100% - 100%)    I&O's Summary    06 Feb 2018 07:01  -  07 Feb 2018 07:00  --------------------------------------------------------  IN: 440 mL / OUT: 0 mL / NET: 440 mL          Physical Exam:   GENERAL: NAD, well-groomed, well-developed  HEENT: MOHSEN/   Atraumatic, Normocephalic  ENMT: No tonsillar erythema, exudates, or enlargement; Moist mucous membranes, Good dentition, No lesions  NECK: Supple, No JVD, Normal thyroid  CHEST/LUNG: Clear to auscultaion, ; No rales, rhonchi, wheezing, or rubs  CVS: Regular rate and rhythm; No murmurs, rubs, or gallops  GI: : Soft, Nontender, Nondistended; Bowel sounds present  NERVOUS SYSTEM:  Alert & Oriented X3  EXTREMITIES:  2+ Peripheral Pulses, No clubbing, cyanosis, or edema  LYMPH: No lymphadenopathy noted  SKIN: No rashes or lesions  ENDOCRINOLOGY: No Thyromegaly  PSYCH: Appropriate    Labs:                              11.3   11.14 )-----------( 361      ( 07 Feb 2018 07:05 )             35.6                         9.9    8.79  )-----------( 312      ( 06 Feb 2018 07:25 )             31.1                         9.4    9.24  )-----------( 278      ( 05 Feb 2018 05:32 )             29.2                         9.8    8.00  )-----------( 286      ( 04 Feb 2018 07:10 )             31.0     02-07    142  |  101  |  31<H>  ----------------------------<  123<H>  3.8   |  24  |  1.81<H>  02-06    142  |  103  |  33<H>  ----------------------------<  168<H>  4.4   |  22  |  1.66<H>  02-05    142  |  102  |  29<H>  ----------------------------<  110<H>  3.4<L>   |  25  |  1.60<H>  02-04    139  |  100  |  30<H>  ----------------------------<  255<H>  3.8   |  24  |  1.64<H>    Ca    9.0      07 Feb 2018 07:05  Ca    9.0      06 Feb 2018 07:25  Mg     2.2     02-07  Mg     2.0     02-06      CAPILLARY BLOOD GLUCOSE      POCT Blood Glucose.: 111 mg/dL (07 Feb 2018 09:16)  POCT Blood Glucose.: 141 mg/dL (06 Feb 2018 21:39)  POCT Blood Glucose.: 140 mg/dL (06 Feb 2018 19:34)  POCT Blood Glucose.: 91 mg/dL (06 Feb 2018 17:18)  POCT Blood Glucose.: 147 mg/dL (06 Feb 2018 12:26)            Cultures:       < from: Xray Chest 1 View- PORTABLE-Routine (02.01.18 @ 08:05) >  rom January 31, 2018.    TECHNIQUE:   AP Portable chest x-ray.    INTERPRETATION:     Heart size and the mediastinum cannot be accurately evaluated on this   projection. Coronary artery calcification and/or stent is seen.  Median sternotomy sutures and surgical clips are again seen.  There is minimal linear atelectasis versus scar in the left lower lung.   The lungs are otherwise clear.  No pleural effusion is noted.  The visualized bony structures appear intact.            IMPRESSION:  Minimal linear atelectasis versus scar in the left lower   lung. Otherwise clear lungs.   Please see report of the CT scan of the chest for any further detail.        < from: CT Chest No Cont (01.31.18 @ 17:59) >  AIRWAYS, LUNGS AND PLEURA: Patent central airways. Previously noted   groundglass opacities within bothlungs are no longer present. Previously   noted bilateral pleural effusions have resolved.  There is no pneumothorax.  VESSELS: Thoracic aorta is normal in caliber. The aorta and coronary   arteries are calcified.  HEART: Cardiomegaly. There is no pericardial effusion.   MEDIASTINUM: No significant mediastinal or hilar adenopathy is seen.   NECK AND CHEST WALL: The visualized thyroid is homogeneous. There is no   significant supraclavicular or axillary lymphadenopathy.  UPPER ABDOMEN: The visualized upper abdomen is unremarkable.  BONES: Status post median sternotomy.    IMPRESSION:  Resolution of the previously noted bilateral pleural effusions and   groundglass opacities within both lungs when compared to previous exam.              KENDALL GRANT M.D., RADIOLOGY RESIDENT  This document has been electronically signed.  IRVING MERCEDES M.D., ATTENDING RADIOLOGIST  This document has been electronically signed. Feb 1 2018 12:23PM        < end of copied text >            CATRACHITA KEENAN M.D., ATTENDING RADIOLOGIST  This document has been electronically signed. Feb 1 2018 12:00PM    < end of copied text >                  Rapid Respiratory Viral Panel Result        01-31 @ 20:56  Rapid RVP --  Coronovirus --  Adenovirus NOT DETECTED  Bordetella Pertussis NOT DETECTED  Chlamydia Pneumonia NOT DETECTED  Entero/RhinovirusNOT DETECTED  HKU1 Coronovirus --  HMPV Coronovirus NOT DETECTED  Influenza A --  Influenza AH1 NOT DETECTED  Influenza AH1 2009 NOT DETECTED  Influenza AH3 POSITIVE  Influenza B NOT DETECTED  Mycoplasma Pneumoniae NOT DETECTED This nucleic acid amplification assay was performed using  the Grabbed. The following pathogens are tested  for: Adenovirus, Coronavirus 229E, Coronavirus HKU1,  Coronavirus NL63, Coronavirus OC43, Human Metapneumovirus  (HMPV), Rhinovirus/Enterovirus, Influenza A H1, Influenza A  H1 2009 (Pandemic H1 2009), Influenza A H3, Influenza A (Flu  A) subtype not identified, Influenza B, Parainfluenza Virus  (types 1, 2, 3, 4), Respiratory Syncytial Virus (RSV),  Bordetella pertussis, Chlamydophila pneumoniae, and  Mycoplasma pneumoniae. A negative FilmArray result does not  always exclude the possibility of viral or bacterial  infection. Laboratory results should always be interpreted  in the context of clinical findings.  NL63 Coronovirus --  OC43 Coronovirus --  Parainfluenza 1 NOT DETECTED  Parainfluenza 2 NOT DETECTED  Parainfluenza 3 NOT DETECTED  Parainfluenza 4 NOT DETECTED  Resp Syncytial Virus NOT DETECTED          Studies  Chest X-RAY  CT SCAN Chest   Venous Dopplers: LE:   CT Abdomen  Others

## 2018-02-07 NOTE — PROGRESS NOTE ADULT - SUBJECTIVE AND OBJECTIVE BOX
Subjective:  no CP or SOB    MEDICATIONS  (STANDING):  aspirin enteric coated 81 milliGRAM(s) Oral daily  atorvastatin 40 milliGRAM(s) Oral at bedtime  famotidine    Tablet 20 milliGRAM(s) Oral daily  ferrous    sulfate 325 milliGRAM(s) Oral daily  furosemide    Tablet 40 milliGRAM(s) Oral daily  heparin  Injectable 5000 Unit(s) SubCutaneous every 12 hours  insulin glargine Injectable (LANTUS) 20 Unit(s) SubCutaneous every morning  insulin glargine Injectable (LANTUS) 68 Unit(s) SubCutaneous at bedtime  insulin lispro (HumaLOG) corrective regimen sliding scale   SubCutaneous three times a day before meals  insulin lispro (HumaLOG) corrective regimen sliding scale   SubCutaneous at bedtime  insulin lispro Injectable (HumaLOG) 24 Unit(s) SubCutaneous three times a day before meals  levothyroxine 50 MICROGram(s) Oral daily  metoprolol succinate ER 12.5 milliGRAM(s) Oral daily  montelukast 10 milliGRAM(s) Oral at bedtime  sodium bicarbonate 650 milliGRAM(s) Oral three times a day  sodium chloride 0.9% lock flush 3 milliLiter(s) IV Push every 8 hours  ticagrelor 90 milliGRAM(s) Oral two times a day    LABS:                        11.3   11.14 )-----------( 361      ( 07 Feb 2018 07:05 )             35.6     142  |  101  |  31<H>  ----------------------------<  123<H>  3.8   |  24  |  1.81<H>    Ca    9.0      07 Feb 2018 07:05  Mg     2.2     02-07     PHYSICAL EXAM  Vital Signs Last 24 Hrs  T(C): 37.1 (07 Feb 2018 12:55), Max: 37.1 (07 Feb 2018 12:55)  T(F): 98.7 (07 Feb 2018 12:55), Max: 98.7 (07 Feb 2018 12:55)  HR: 94 (07 Feb 2018 14:03) (81 - 94)  BP: 109/64 (07 Feb 2018 12:55) (109/64 - 123/68)  RR: 16 (07 Feb 2018 12:55) (16 - 18)  SpO2: 100% (07 Feb 2018 12:55) (100% - 100%)    Cardiovascular: S1S2 RRR  Respiratory: CTA BL   Gastrointestinal:  Soft, Non-tender, + BS	  Extremities No edema, cyanosis or clubbing  B/L LE's     DIAGNOSTIC DATA:     TELEMETRY: SR 	       < from: Cardiac Cath Lab - Adult (10.13.17 @ 10:40) >  VENTRICLES: No left ventriculogram was performed.  CORONARY VESSELS: The coronary circulation is right dominant.  LM:   --  LM: Normal.  LAD:   --  Proximal LAD: There was a diffuse 60 % stenosis.  --  Mid LAD: There was a 100 % stenosis with the distal vessel being filled  via LIMA-LAD.  CX:   --  Circumflex: The vessel was small sized. Angiography showed mild  atherosclerosis withno flow limiting lesions.  --  OM1: Angiography showed mild atherosclerosis with no flow limiting  lesions.  RI:   --  Ostial ramus intermedius: There was a tubular 80 % stenosis.  There was PARUL grade 3 flow through the vessel (brisk flow).  RCA:   --  Proximal RCA: Angiography showed mild atherosclerosis with no  flow limiting lesions.  --  Mid RCA: There was a 100 % stenosis with the distal vessel being filled  via collaterals. This lesion is a chronic total occlusion.  GRAFTS:   --  Graft to the LAD: The graft was a LIMA. Graft angiography  showed minor luminal irregularities. Distal vessel angiography showed  minor luminal irregularities.  AORTA: Ascending aorta: Normal. No additional grafts visualized in  aortogram.  COMPLICATIONS: There were no complications.  DIAGNOSTIC RECOMMENDATIONS: Coronary angiogram demonstrates patent  LIMA-LAD, closed SVG grafts, and a severe stenosis in the ostial Ramus.  Will perform staged PCI to RI when Cr stable and renally optimized.  Prepared and signed by  Corie Ga M.D.  Signed 10/17/2017 13:49:15    < end of copied text >    < from: Cardiac Cath Lab - Adult (11.17.17 @ 11:48) >  PROCEDURE:  --  Intervention on ramus intermedius: drug-eluting stent.    VENTRICLES: No LV gram was performed; however, a recent echocardiogram  demonstrated normal global and regional LV function.  CORONARY VESSELS: The coronary circulation is right dominant.  LM:   --  LM: Angiography showed minor luminal irregularities with no flow  limiting lesions.  LAD:   --  Ostial LAD: There was a 100 % stenosis.  CX:   --  Circumflex: This vessel was not injected, but was visualized  during a prior cardiac catheterization.  RI:   --  Ramus intermedius: There was a 95 % stenosis.  RCA:   --  RCA: This vessel was not injected.  COMPLICATIONS: There were no complications.  DIAGNOSTIC RECOMMENDATIONS: The patient should continue with the present  medications. will add plavix 75mg po once a day/ will add ECASA 325mg po  once day.  Prepared and signed by  Roberta Quick M.D.  Signed 11/17/2017 13:16:01    < end of copied text >    < from: TTE with Doppler (w/Cont) (11.25.17 @ 12:33) >  CONCLUSIONS:  1. Mitral annular calcification, otherwise normal mitral  valve. Mild mitral regurgitation.  2. Normal left ventricular internal dimensions and wall  thicknesses.  3. Endocardium not well visualized; grossly moderate to  severe segmental left ventricularsystolic dysfunction.  Hypokinesis of the inferior, lateral, and inferolateral  walls.  Endocardial visualization enhanced with intravenous  injection of echo contrast (Definity).  No LV thrombus  seen.  4. The right ventricle is not well visualized;grossly  normal right ventricular systolic function.  ------------------------------------------------------------------------  Confirmed on  11/25/2017 - 14:44:41 by Jose Lucia M.D.    < end of copied text >    < from: Cardiac Cath Lab - Adult (12.05.17 @ 12:48) >  VENTRICLES: No LV gram was performed; however, a recent echocardiogram  demonstrated an EF of 36 %.  CORONARY VESSELS: The coronary circulation is right dominant.  LM:   --  Distal left main: Angiography showed minor luminal irregularities  with no flow limiting lesions.  LAD:   --  Mid LAD: There was a 100 % stenosis with the distal vessel being  filled via LIMA-LAD.  CX:   --  Proximal circumflex: There was a tubular 20 % stenosis.  --  Mid circumflex: Angiography showed minor luminal irregularities with no  flow limiting lesions.  --  Distal circumflex: Angiography showed minor luminal irregularities with  no flow limiting lesions.  --  OM1: The vessel was small sized. There was a discrete 60 % stenosis.  RI:   --  Ostial ramus intermedius: Angiography showed minor luminal  irregularities with no flow limiting lesions. There was no significant  restenosis in RI stent.  --  Proximal ramus intermedius: Angiography showed minor luminal  irregularities with no flow limiting lesions.  --  Mid ramus intermedius: There was a tubular 80 % stenosis. The lesion  was associated with a small filling defect consistent with thrombus.  RCA:   --  Mid RCA: There was a 100 % stenosis with the distal vessel being  filled via collaterals from the LAD.  GRAFTS:   --  Graft to the LAD: The graft was a LIMA. Graft angiography  showed minor luminal irregularities. Distal vessel angiography showed  minor luminalirregularities.  COMPLICATIONS: There were no complications.  DIAGNOSTIC RECOMMENDATIONS: Coronary angiogram demonstrates a severe  stenosis in the ramus intermedius that is the cause of the patient's  clinical presentation. Will therefore perform PCI to the RI.  INTERVENTIONAL RECOMMENDATIONS: S/p successful CORNELIA to the Ramus  Intermedius. The patient should continue with ASA and Brilinta for at  least 12 months.  Prepared and signed by  Corie Ga M.D.  Signed 12/06/2017 17:06:30    < from: CT Chest No Cont (01.20.18 @ 09:55) >  IMPRESSION:   Mild pulmonary edema with small simple bilateral pleural effusions, right   greater than left, with no evidence of loculation.    < end of copied text >    < from: TTE with Doppler (w/Cont) (01.23.18 @ 12:31) >  CONCLUSIONS:  1. Mitral annular calcification, otherwise normal mitral  valve. Mild-moderate mitral regurgitation.  2. Calcified trileaflet aortic valve with normal opening.  Mild aortic regurgitation.  3. Normal left ventricular internal dimensions and wall  thicknesses.  4. Endocardium not well visualized; grossly severe global  left ventricular systolic dysfunction.  Endocardial  visualization enhanced with intravenous injection of echo  contrast (Definity).  5. The right ventricle is not well visualized; grossly  normal right ventricular systolic function.  *** Compared with echocardiogram of 11/25/2017, no  significant changes noted.  ------------------------------------------------------------------------  Confirmed on  1/23/2018 - 14:35:49 by Jose Lucia M.D.    < end of copied text >    < from: Nuclear Stress Test-Pharmacologic (01.28.18 @ 12:31) >  IMPRESSIONS:Abnormal Study  * Myocardial Perfusion SPECT results are abnormal.  * There is a medium sized, mild to moderate defect in  anterior wall that is reversible, suggestive of  ischemia.There are large, moderate to severe defects in  inferolateral, lateral walls that are fixed, suggestive of  infarct.  * Post-stress gated wall motion analysis was performed  (LVEF = 33 %;LVEDV = 116 ml.), revealing severe overall  hypokinesis. There was focal inferiolateral akinesis and  severe lateral hypokinesis with absence of systolic  thickening. The best motion was in the septum.  *** Compared with Nuclear/Stress test of 11/5/2014, the  observed defect are now more extensive, suggesting  progression of disease. Prior LVEF was 39% with EDV 77 mL.  ------------------------------------------------------------------------  Revised on  1/29/2018 - 16:44:19 by Lorenzo Covarrubias M.D.  Confirmed on  1/30/2018 - 15:45:44 by José Hansen M.D.  < end of copied text >    < from: CT Chest No Cont (01.31.18 @ 17:59) >  IMPRESSION:  Resolution of the previously noted bilateral pleural effusions and   groundglass opacities within both lungs when compared to previous exam.    < end of copied text >    ASSESSMENT/PLAN: 	72y Female w/ PMH HTN, CKD, Hyperlipidemia, DM-II, CAD s/p 3V CABG  (LIMA to LAD, SVG to OM, SVG to PDA) at [facility] 2014, s/p CORNELIA TO RI 11/17/17, NSTEMI 12/2017 s/p LHC on 12/5 and CORNELIA to D Ramus, TTE at that time revealed grossly moderate to severe LV dysfunction with hypokinesis of the inferior, lateral and inferolateral with an EF of 36% admitted with acute on chronic systolic CHF, s/p IV diureses, s/p NST as above, now s/p Flu and course of Tamiflu     --Not in decomp CHF  --Given ongoing chest pain and NST as noted above, s/p LHC with no new OBS CAD  --Cont current meds  --Given rising creatinine, cont to monitor today inpatient      Juliane Fitch PA-C  [facility]  [address], [address]   [place] [address]  office [phone]  pager [phone]

## 2018-02-07 NOTE — PROGRESS NOTE ADULT - ASSESSMENT
1.	CHF decompensation, EF 32%  2.	MERVIN: renal function fluctuating likely sec. to CHF. hyperglycemia. On lasix, FEurea<35%. But still within baseline  3.	CKD 3, baseline 1.5-1.8  4.	CAD. cardio following  5.	Acidosis improved   6.	Hypokalemia  7.	fever +flu on Tamiflu   8.	anemia: iron sat 10%    PLAN:   1.	Continue diuretic   2.	Monitor BMP.  3.	f/u cardio  4.	S/p Cath 2/6/17 monitor BMP

## 2018-02-07 NOTE — PROGRESS NOTE ADULT - ASSESSMENT
71 Female, with a PmHx of HTN, CKD, Hyperlipidemia, DM-II, CAD s/p CABG x 3, s/p stents, presenting to the [facility] ED c/o shortness of breath. Pt is being admitted to telemetry for acute on chronic systolic heart failure.  Now she is with new fever, as well as URI

## 2018-02-07 NOTE — PROGRESS NOTE ADULT - SUBJECTIVE AND OBJECTIVE BOX
SELVIN CLAIRE 72y [id number]    Patient is a 72y old  Female who presents with a chief complaint of SOB (25 Jan 2018 15:14)      Follow Up/CC:  leukocytosis    Interval History/ROS: feels ok, s/p cath yesterday  Daughter-in-law helped with translation     Allergies    No Known Allergies    Intolerances        ANTIMICROBIALS:      MEDICATIONS  (STANDING):  aspirin enteric coated 81 milliGRAM(s) Oral daily  atorvastatin 40 milliGRAM(s) Oral at bedtime  dextrose 5%. 1000 milliLiter(s) (50 mL/Hr) IV Continuous <Continuous>  dextrose 50% Injectable 12.5 Gram(s) IV Push once  dextrose 50% Injectable 25 Gram(s) IV Push once  dextrose 50% Injectable 25 Gram(s) IV Push once  famotidine    Tablet 20 milliGRAM(s) Oral daily  ferrous    sulfate 325 milliGRAM(s) Oral daily  furosemide    Tablet 40 milliGRAM(s) Oral daily  heparin  Injectable 5000 Unit(s) SubCutaneous every 12 hours  insulin glargine Injectable (LANTUS) 20 Unit(s) SubCutaneous every morning  insulin glargine Injectable (LANTUS) 68 Unit(s) SubCutaneous at bedtime  insulin lispro (HumaLOG) corrective regimen sliding scale   SubCutaneous three times a day before meals  insulin lispro (HumaLOG) corrective regimen sliding scale   SubCutaneous at bedtime  insulin lispro Injectable (HumaLOG) 24 Unit(s) SubCutaneous three times a day before meals  levothyroxine 50 MICROGram(s) Oral daily  metoprolol succinate ER 12.5 milliGRAM(s) Oral daily  montelukast 10 milliGRAM(s) Oral at bedtime  sodium bicarbonate 650 milliGRAM(s) Oral three times a day  sodium chloride 0.9% lock flush 3 milliLiter(s) IV Push every 8 hours  ticagrelor 90 milliGRAM(s) Oral two times a day    MEDICATIONS  (PRN):  acetaminophen   Tablet 650 milliGRAM(s) Oral every 6 hours PRN For Temp greater than 38 C (100.4 F)  acetaminophen   Tablet. 650 milliGRAM(s) Oral every 6 hours PRN Mild, moderate and severe pain  benzocaine 15 mG/menthol 3.6 mG Lozenge 1 Lozenge Oral every 6 hours PRN Sore Throat  dextrose Gel 1 Dose(s) Oral once PRN Blood Glucose LESS THAN 70 milliGRAM(s)/deciliter  glucagon  Injectable 1 milliGRAM(s) IntraMuscular once PRN Glucose LESS THAN 70 milligrams/deciliter  guaiFENesin    Syrup 100 milliGRAM(s) Oral every 6 hours PRN Cough  sodium chloride 0.65% Nasal 1 Spray(s) Both Nostrils three times a day PRN Nasal Congestion        Vital Signs Last 24 Hrs  T(C): 36.7 (07 Feb 2018 05:06), Max: 36.7 (06 Feb 2018 18:37)  T(F): 98.1 (07 Feb 2018 05:06), Max: 98.1 (06 Feb 2018 18:37)  HR: 81 (07 Feb 2018 05:06) (81 - 85)  BP: 123/68 (07 Feb 2018 05:06) (115/69 - 123/68)  BP(mean): --  RR: 16 (07 Feb 2018 05:06) (16 - 18)  SpO2: 100% (07 Feb 2018 05:06) (100% - 100%)    CBC Full  -  ( 07 Feb 2018 07:05 )  WBC Count : 11.14 K/uL  Hemoglobin : 11.3 g/dL  Hematocrit : 35.6 %  Platelet Count - Automated : 361 K/uL  Mean Cell Volume : 86.2 fL  Mean Cell Hemoglobin : 27.4 pg  Mean Cell Hemoglobin Concentration : 31.7 %  Auto Neutrophil # : x  Auto Lymphocyte # : x  Auto Monocyte # : x  Auto Eosinophil # : x  Auto Basophil # : x  Auto Neutrophil % : x  Auto Lymphocyte % : x  Auto Monocyte % : x  Auto Eosinophil % : x  Auto Basophil % : x    02-07    142  |  101  |  31<H>  ----------------------------<  123<H>  3.8   |  24  |  1.81<H>    Ca    9.0      07 Feb 2018 07:05  Mg     2.2     02-07            MICROBIOLOGY:  BLOOD VENOUS  01-30-18 --  --  --      RADIOLOGY

## 2018-02-07 NOTE — PROGRESS NOTE ADULT - SUBJECTIVE AND OBJECTIVE BOX
Dr. Muhammad (Nephrology)  Office [phone]  Cell [phone]  [facility] FIELD  Cell [phone]      Patient is a 72y old  Female who presents with a chief complaint of SOB (25 Jan 2018 15:14)      Patient seen and examined at bedside. No chest pain/sob    VITALS:  T(F): 98.1 (02-07-18 @ 05:06), Max: 98.1 (02-06-18 @ 18:37)  HR: 81 (02-07-18 @ 05:06)  BP: 123/68 (02-07-18 @ 05:06)  RR: 16 (02-07-18 @ 05:06)  SpO2: 100% (02-07-18 @ 05:06)  Wt(kg): --    02-06 @ 07:01  -  02-07 @ 07:00  --------------------------------------------------------  IN: 440 mL / OUT: 0 mL / NET: 440 mL          PHYSICAL EXAM:  Constitutional: NAD  Neck: No JVD  Respiratory: CTAB, no wheezes, rales or rhonchi  Cardiovascular: S1, S2, RRR  Gastrointestinal: BS+, soft, NT/ND  Extremities: No peripheral edema    Hospital Medications:   MEDICATIONS  (STANDING):  aspirin enteric coated 81 milliGRAM(s) Oral daily  atorvastatin 40 milliGRAM(s) Oral at bedtime  dextrose 5%. 1000 milliLiter(s) (50 mL/Hr) IV Continuous <Continuous>  dextrose 50% Injectable 12.5 Gram(s) IV Push once  dextrose 50% Injectable 25 Gram(s) IV Push once  dextrose 50% Injectable 25 Gram(s) IV Push once  famotidine    Tablet 20 milliGRAM(s) Oral daily  ferrous    sulfate 325 milliGRAM(s) Oral daily  furosemide    Tablet 40 milliGRAM(s) Oral daily  heparin  Injectable 5000 Unit(s) SubCutaneous every 12 hours  insulin glargine Injectable (LANTUS) 20 Unit(s) SubCutaneous every morning  insulin glargine Injectable (LANTUS) 68 Unit(s) SubCutaneous at bedtime  insulin lispro (HumaLOG) corrective regimen sliding scale   SubCutaneous three times a day before meals  insulin lispro (HumaLOG) corrective regimen sliding scale   SubCutaneous at bedtime  insulin lispro Injectable (HumaLOG) 24 Unit(s) SubCutaneous three times a day before meals  levothyroxine 50 MICROGram(s) Oral daily  metoprolol succinate ER 12.5 milliGRAM(s) Oral daily  montelukast 10 milliGRAM(s) Oral at bedtime  sodium bicarbonate 650 milliGRAM(s) Oral three times a day  sodium chloride 0.9% lock flush 3 milliLiter(s) IV Push every 8 hours  ticagrelor 90 milliGRAM(s) Oral two times a day      LABS:  02-07    142  |  101  |  31<H>  ----------------------------<  123<H>  3.8   |  24  |  1.81<H>    Ca    9.0      07 Feb 2018 07:05  Mg     2.2     02-07      Creatinine Trend: 1.81 <--, 1.66 <--, 1.60 <--, 1.64 <--, 1.52 <--, 1.70 <--, 1.61 <--                                11.3   11.14 )-----------( 361      ( 07 Feb 2018 07:05 )             35.6     Urine Studies:  Urinalysis - [01-30-18 @ 15:20]      Color PLYEL / Appearance CLEAR / SG 1.018 / pH 6.5      Gluc >1000 / Ketone NEGATIVE  / Bili NEGATIVE / Urobili NORMAL       Blood TRACE / Protein 150 / Leuk Est NEGATIVE / Nitrite NEGATIVE      RBC 0-2 / WBC 0-2 / Hyaline  / Gran  / Sq Epi OCC / Non Sq Epi  / Bacteria       Iron 40, TIBC 377, %sat --      [01-20-18 @ 06:06]  Ferritin 38.35      [01-20-18 @ 06:06]  HbA1c 9.1      [11-25-17 @ 06:30]  TSH 0.55      [01-19-18 @ 06:45]  Lipid: chol 130, , HDL 30, LDL 70      [01-19-18 @ 06:45]        RADIOLOGY & ADDITIONAL STUDIES:

## 2018-02-07 NOTE — PROGRESS NOTE ADULT - SUBJECTIVE AND OBJECTIVE BOX
Chief complaint  Patient is a 72y old  Female who presents with a chief complaint of SOB (25 Jan 2018 15:14)   Review of systems  Patient in bed, looks comfortable, no fever,  no hypoglycemia.    Labs and Fingersticks  CAPILLARY BLOOD GLUCOSE      POCT Blood Glucose.: 111 mg/dL (07 Feb 2018 09:16)  POCT Blood Glucose.: 141 mg/dL (06 Feb 2018 21:39)  POCT Blood Glucose.: 140 mg/dL (06 Feb 2018 19:34)  POCT Blood Glucose.: 91 mg/dL (06 Feb 2018 17:18)  POCT Blood Glucose.: 147 mg/dL (06 Feb 2018 12:26)          Calcium, Total Serum: 9.0 (02-07 @ 07:05)  Calcium, Total Serum: 9.0 (02-06 @ 07:25)          02-07    142  |  101  |  31<H>  ----------------------------<  123<H>  3.8   |  24  |  1.81<H>    Ca    9.0      07 Feb 2018 07:05  Mg     2.2     02-07                          11.3   11.14 )-----------( 361      ( 07 Feb 2018 07:05 )             35.6     Medications  MEDICATIONS  (STANDING):  aspirin enteric coated 81 milliGRAM(s) Oral daily  atorvastatin 40 milliGRAM(s) Oral at bedtime  dextrose 5%. 1000 milliLiter(s) (50 mL/Hr) IV Continuous <Continuous>  dextrose 50% Injectable 12.5 Gram(s) IV Push once  dextrose 50% Injectable 25 Gram(s) IV Push once  dextrose 50% Injectable 25 Gram(s) IV Push once  famotidine    Tablet 20 milliGRAM(s) Oral daily  ferrous    sulfate 325 milliGRAM(s) Oral daily  furosemide    Tablet 40 milliGRAM(s) Oral daily  heparin  Injectable 5000 Unit(s) SubCutaneous every 12 hours  insulin glargine Injectable (LANTUS) 20 Unit(s) SubCutaneous every morning  insulin glargine Injectable (LANTUS) 68 Unit(s) SubCutaneous at bedtime  insulin lispro (HumaLOG) corrective regimen sliding scale   SubCutaneous three times a day before meals  insulin lispro (HumaLOG) corrective regimen sliding scale   SubCutaneous at bedtime  insulin lispro Injectable (HumaLOG) 24 Unit(s) SubCutaneous three times a day before meals  levothyroxine 50 MICROGram(s) Oral daily  metoprolol succinate ER 12.5 milliGRAM(s) Oral daily  montelukast 10 milliGRAM(s) Oral at bedtime  sodium bicarbonate 650 milliGRAM(s) Oral three times a day  sodium chloride 0.9% lock flush 3 milliLiter(s) IV Push every 8 hours  ticagrelor 90 milliGRAM(s) Oral two times a day      Physical Exam  General: Patient comfortable in bed  Vital Signs Last 12 Hrs  T(F): 98.1 (02-07-18 @ 05:06), Max: 98.1 (02-07-18 @ 05:06)  HR: 81 (02-07-18 @ 05:06) (81 - 81)  BP: 123/68 (02-07-18 @ 05:06) (123/68 - 123/68)  BP(mean): --  RR: 16 (02-07-18 @ 05:06) (16 - 16)  SpO2: 100% (02-07-18 @ 05:06) (100% - 100%)  Neck: No palpable thyroid nodules.  CVS: S1S2, No murmurs  Respiratory: No wheezing, no crepitations  GI: Abdomen soft, bowel sounds positive  Musculoskeletal:  edema lower extremities.   Skin: No skin rashes, no ecchymosis    Diagnostics

## 2018-02-07 NOTE — PROGRESS NOTE ADULT - ASSESSMENT
71 Female, with a PmHx of HTN, CKD, Hyperlipidemia, DM-II, CAD s/p CABG x 3, s/p stents, presenting to the [facility] ED c/o shortness of breath with HF with fever and URI symptoms

## 2018-02-08 LAB
BASOPHILS # BLD AUTO: 0.04 K/UL — SIGNIFICANT CHANGE UP (ref 0–0.2)
BASOPHILS NFR BLD AUTO: 0.4 % — SIGNIFICANT CHANGE UP (ref 0–2)
BUN SERPL-MCNC: 32 MG/DL — HIGH (ref 7–23)
BUN SERPL-MCNC: 32 MG/DL — HIGH (ref 7–23)
CALCIUM SERPL-MCNC: 8.9 MG/DL — SIGNIFICANT CHANGE UP (ref 8.4–10.5)
CALCIUM SERPL-MCNC: 8.9 MG/DL — SIGNIFICANT CHANGE UP (ref 8.4–10.5)
CHLORIDE SERPL-SCNC: 99 MMOL/L — SIGNIFICANT CHANGE UP (ref 98–107)
CHLORIDE SERPL-SCNC: 99 MMOL/L — SIGNIFICANT CHANGE UP (ref 98–107)
CO2 SERPL-SCNC: 24 MMOL/L — SIGNIFICANT CHANGE UP (ref 22–31)
CO2 SERPL-SCNC: 24 MMOL/L — SIGNIFICANT CHANGE UP (ref 22–31)
CREAT ?TM UR-MCNC: 58.68 MG/DL — SIGNIFICANT CHANGE UP
CREAT SERPL-MCNC: 1.9 MG/DL — HIGH (ref 0.5–1.3)
CREAT SERPL-MCNC: 1.9 MG/DL — HIGH (ref 0.5–1.3)
EOSINOPHIL # BLD AUTO: 0.36 K/UL — SIGNIFICANT CHANGE UP (ref 0–0.5)
EOSINOPHIL NFR BLD AUTO: 3.2 % — SIGNIFICANT CHANGE UP (ref 0–6)
EOSINOPHIL NFR URNS MANUAL: SIGNIFICANT CHANGE UP (ref 0–0)
GLUCOSE BLDC GLUCOMTR-MCNC: 112 MG/DL — HIGH (ref 70–99)
GLUCOSE BLDC GLUCOMTR-MCNC: 185 MG/DL — HIGH (ref 70–99)
GLUCOSE BLDC GLUCOMTR-MCNC: 201 MG/DL — HIGH (ref 70–99)
GLUCOSE BLDC GLUCOMTR-MCNC: 225 MG/DL — HIGH (ref 70–99)
GLUCOSE SERPL-MCNC: 86 MG/DL — SIGNIFICANT CHANGE UP (ref 70–99)
GLUCOSE SERPL-MCNC: 86 MG/DL — SIGNIFICANT CHANGE UP (ref 70–99)
HCT VFR BLD CALC: 35.3 % — SIGNIFICANT CHANGE UP (ref 34.5–45)
HCT VFR BLD CALC: 35.3 % — SIGNIFICANT CHANGE UP (ref 34.5–45)
HGB BLD-MCNC: 11.1 G/DL — LOW (ref 11.5–15.5)
HGB BLD-MCNC: 11.1 G/DL — LOW (ref 11.5–15.5)
IMM GRANULOCYTES # BLD AUTO: 0.12 # — SIGNIFICANT CHANGE UP
IMM GRANULOCYTES NFR BLD AUTO: 1.1 % — SIGNIFICANT CHANGE UP (ref 0–1.5)
LYMPHOCYTES # BLD AUTO: 3.93 K/UL — HIGH (ref 1–3.3)
LYMPHOCYTES # BLD AUTO: 34.5 % — SIGNIFICANT CHANGE UP (ref 13–44)
MAGNESIUM SERPL-MCNC: 2.1 MG/DL — SIGNIFICANT CHANGE UP (ref 1.6–2.6)
MCHC RBC-ENTMCNC: 26.3 PG — LOW (ref 27–34)
MCHC RBC-ENTMCNC: 26.3 PG — LOW (ref 27–34)
MCHC RBC-ENTMCNC: 31.4 % — LOW (ref 32–36)
MCHC RBC-ENTMCNC: 31.4 % — LOW (ref 32–36)
MCV RBC AUTO: 83.6 FL — SIGNIFICANT CHANGE UP (ref 80–100)
MCV RBC AUTO: 83.6 FL — SIGNIFICANT CHANGE UP (ref 80–100)
MONOCYTES # BLD AUTO: 0.88 K/UL — SIGNIFICANT CHANGE UP (ref 0–0.9)
MONOCYTES NFR BLD AUTO: 7.7 % — SIGNIFICANT CHANGE UP (ref 2–14)
NEUTROPHILS # BLD AUTO: 6.07 K/UL — SIGNIFICANT CHANGE UP (ref 1.8–7.4)
NEUTROPHILS NFR BLD AUTO: 53.1 % — SIGNIFICANT CHANGE UP (ref 43–77)
NRBC # FLD: 0 — SIGNIFICANT CHANGE UP
NRBC # FLD: 0 — SIGNIFICANT CHANGE UP
PHOSPHATE SERPL-MCNC: 3.3 MG/DL — SIGNIFICANT CHANGE UP (ref 2.5–4.5)
PLATELET # BLD AUTO: 347 K/UL — SIGNIFICANT CHANGE UP (ref 150–400)
PLATELET # BLD AUTO: 347 K/UL — SIGNIFICANT CHANGE UP (ref 150–400)
PMV BLD: 8.9 FL — SIGNIFICANT CHANGE UP (ref 7–13)
PMV BLD: 8.9 FL — SIGNIFICANT CHANGE UP (ref 7–13)
POTASSIUM SERPL-MCNC: 3.7 MMOL/L — SIGNIFICANT CHANGE UP (ref 3.5–5.3)
POTASSIUM SERPL-MCNC: 3.7 MMOL/L — SIGNIFICANT CHANGE UP (ref 3.5–5.3)
POTASSIUM SERPL-SCNC: 3.7 MMOL/L — SIGNIFICANT CHANGE UP (ref 3.5–5.3)
POTASSIUM SERPL-SCNC: 3.7 MMOL/L — SIGNIFICANT CHANGE UP (ref 3.5–5.3)
RBC # BLD: 4.22 M/UL — SIGNIFICANT CHANGE UP (ref 3.8–5.2)
RBC # BLD: 4.22 M/UL — SIGNIFICANT CHANGE UP (ref 3.8–5.2)
RBC # FLD: 15.9 % — HIGH (ref 10.3–14.5)
RBC # FLD: 15.9 % — HIGH (ref 10.3–14.5)
SODIUM SERPL-SCNC: 140 MMOL/L — SIGNIFICANT CHANGE UP (ref 135–145)
SODIUM SERPL-SCNC: 140 MMOL/L — SIGNIFICANT CHANGE UP (ref 135–145)
UUN UR-MCNC: 360.9 MG/DL — SIGNIFICANT CHANGE UP
WBC # BLD: 11.4 K/UL — HIGH (ref 3.8–10.5)
WBC # BLD: 11.4 K/UL — HIGH (ref 3.8–10.5)
WBC # FLD AUTO: 11.4 K/UL — HIGH (ref 3.8–10.5)
WBC # FLD AUTO: 11.4 K/UL — HIGH (ref 3.8–10.5)

## 2018-02-08 PROCEDURE — 99232 SBSQ HOSP IP/OBS MODERATE 35: CPT

## 2018-02-08 RX ADMIN — SODIUM CHLORIDE 3 MILLILITER(S): 9 INJECTION INTRAMUSCULAR; INTRAVENOUS; SUBCUTANEOUS at 05:31

## 2018-02-08 RX ADMIN — Medication 650 MILLIGRAM(S): at 13:29

## 2018-02-08 RX ADMIN — MONTELUKAST 10 MILLIGRAM(S): 4 TABLET, CHEWABLE ORAL at 21:16

## 2018-02-08 RX ADMIN — SODIUM CHLORIDE 3 MILLILITER(S): 9 INJECTION INTRAMUSCULAR; INTRAVENOUS; SUBCUTANEOUS at 13:34

## 2018-02-08 RX ADMIN — TICAGRELOR 90 MILLIGRAM(S): 90 TABLET ORAL at 05:32

## 2018-02-08 RX ADMIN — Medication 24 UNIT(S): at 09:26

## 2018-02-08 RX ADMIN — INSULIN GLARGINE 68 UNIT(S): 100 INJECTION, SOLUTION SUBCUTANEOUS at 22:25

## 2018-02-08 RX ADMIN — TICAGRELOR 90 MILLIGRAM(S): 90 TABLET ORAL at 17:22

## 2018-02-08 RX ADMIN — Medication 81 MILLIGRAM(S): at 13:29

## 2018-02-08 RX ADMIN — INSULIN GLARGINE 20 UNIT(S): 100 INJECTION, SOLUTION SUBCUTANEOUS at 09:26

## 2018-02-08 RX ADMIN — ATORVASTATIN CALCIUM 40 MILLIGRAM(S): 80 TABLET, FILM COATED ORAL at 21:16

## 2018-02-08 RX ADMIN — Medication 50 MICROGRAM(S): at 05:32

## 2018-02-08 RX ADMIN — Medication 650 MILLIGRAM(S): at 21:16

## 2018-02-08 RX ADMIN — Medication 24 UNIT(S): at 17:38

## 2018-02-08 RX ADMIN — Medication 24 UNIT(S): at 13:28

## 2018-02-08 RX ADMIN — Medication 650 MILLIGRAM(S): at 05:32

## 2018-02-08 RX ADMIN — FAMOTIDINE 20 MILLIGRAM(S): 10 INJECTION INTRAVENOUS at 13:30

## 2018-02-08 RX ADMIN — HEPARIN SODIUM 5000 UNIT(S): 5000 INJECTION INTRAVENOUS; SUBCUTANEOUS at 17:22

## 2018-02-08 RX ADMIN — Medication 4: at 13:28

## 2018-02-08 RX ADMIN — Medication 325 MILLIGRAM(S): at 13:29

## 2018-02-08 RX ADMIN — SODIUM CHLORIDE 3 MILLILITER(S): 9 INJECTION INTRAMUSCULAR; INTRAVENOUS; SUBCUTANEOUS at 21:14

## 2018-02-08 RX ADMIN — Medication 2: at 17:38

## 2018-02-08 RX ADMIN — Medication 40 MILLIGRAM(S): at 05:32

## 2018-02-08 RX ADMIN — Medication 12.5 MILLIGRAM(S): at 05:32

## 2018-02-08 RX ADMIN — HEPARIN SODIUM 5000 UNIT(S): 5000 INJECTION INTRAVENOUS; SUBCUTANEOUS at 05:32

## 2018-02-08 NOTE — PROGRESS NOTE ADULT - SUBJECTIVE AND OBJECTIVE BOX
Chief complaint  Patient is a 72y old  Female who presents with a chief complaint of SOB (25 Jan 2018 15:14)   Review of systems  Patient in bed, looks comfortable, no fever, no hypoglycemia.    Labs and Fingersticks  CAPILLARY BLOOD GLUCOSE      POCT Blood Glucose.: 185 mg/dL (08 Feb 2018 17:25)  POCT Blood Glucose.: 225 mg/dL (08 Feb 2018 12:37)  POCT Blood Glucose.: 112 mg/dL (08 Feb 2018 08:59)  POCT Blood Glucose.: 106 mg/dL (07 Feb 2018 22:29)          Calcium, Total Serum: 8.9 (02-08 @ 08:05)  Calcium, Total Serum: 8.9 (02-08 @ 08:05)  Calcium, Total Serum: 9.0 (02-07 @ 07:05)          02-08    140  |  99  |  32<H>  ----------------------------<  86  3.7   |  24  |  1.90<H>    Ca    8.9      08 Feb 2018 08:05  Phos  3.3     02-08  Mg     2.1     02-08                          11.1   11.40 )-----------( 347      ( 08 Feb 2018 08:05 )             35.3     Medications  MEDICATIONS  (STANDING):  aspirin enteric coated 81 milliGRAM(s) Oral daily  atorvastatin 40 milliGRAM(s) Oral at bedtime  dextrose 5%. 1000 milliLiter(s) (50 mL/Hr) IV Continuous <Continuous>  dextrose 50% Injectable 12.5 Gram(s) IV Push once  dextrose 50% Injectable 25 Gram(s) IV Push once  dextrose 50% Injectable 25 Gram(s) IV Push once  famotidine    Tablet 20 milliGRAM(s) Oral daily  ferrous    sulfate 325 milliGRAM(s) Oral daily  furosemide    Tablet 40 milliGRAM(s) Oral daily  heparin  Injectable 5000 Unit(s) SubCutaneous every 12 hours  insulin glargine Injectable (LANTUS) 20 Unit(s) SubCutaneous every morning  insulin glargine Injectable (LANTUS) 68 Unit(s) SubCutaneous at bedtime  insulin lispro (HumaLOG) corrective regimen sliding scale   SubCutaneous three times a day before meals  insulin lispro (HumaLOG) corrective regimen sliding scale   SubCutaneous at bedtime  insulin lispro Injectable (HumaLOG) 24 Unit(s) SubCutaneous three times a day before meals  levothyroxine 50 MICROGram(s) Oral daily  metoprolol succinate ER 12.5 milliGRAM(s) Oral daily  montelukast 10 milliGRAM(s) Oral at bedtime  sodium bicarbonate 650 milliGRAM(s) Oral three times a day  sodium chloride 0.9% lock flush 3 milliLiter(s) IV Push every 8 hours  ticagrelor 90 milliGRAM(s) Oral two times a day      Physical Exam  General: Patient comfortable in bed  Vital Signs Last 12 Hrs  T(F): 97.9 (02-08-18 @ 12:38), Max: 97.9 (02-08-18 @ 12:38)  HR: 83 (02-08-18 @ 16:05) (83 - 87)  BP: 108/62 (02-08-18 @ 12:38) (108/62 - 108/62)  BP(mean): --  RR: 17 (02-08-18 @ 12:38) (17 - 17)  SpO2: 100% (02-08-18 @ 12:38) (100% - 100%)  Neck: No palpable thyroid nodules.  CVS: S1S2, No murmurs  Respiratory: No wheezing, no crepitations  GI: Abdomen soft, bowel sounds positive  Musculoskeletal:  edema lower extremities.   Skin: No skin rashes, no ecchymosis    Diagnostics

## 2018-02-08 NOTE — PROGRESS NOTE ADULT - ASSESSMENT
1.	CHF decompensation, EF 32%  2.	MERVIN: renal function fluctuating likely sec. to CHF. renal function worsening likely contrast induced. angio 2/6  3.	CKD 3, baseline 1.5-1.8  4.	CAD. cardio following  5.	Acidosis improved   6.	Hypokalemia  7.	fever +flu on Tamiflu   8.	anemia: iron sat 10%    PLAN:   1.	Continue diuretic   2.	Monitor BMP.  3.	f/u cardio  4.	check urine cr, urea, eos

## 2018-02-08 NOTE — PROGRESS NOTE ADULT - SUBJECTIVE AND OBJECTIVE BOX
Dr. Muhammad (Nephrology)  Office [phone]  Cell [phone]  [facility] FI[facility]  Cell [phone]      Patient is a 72y old  Female who presents with a chief complaint of SOB (25 Jan 2018 15:14)      Patient seen and examined at bedside. No chest pain/sob    VITALS:  T(F): 97.9 (02-08-18 @ 12:38), Max: 98.2 (02-08-18 @ 05:30)  HR: 83 (02-08-18 @ 16:05)  BP: 108/62 (02-08-18 @ 12:38)  RR: 17 (02-08-18 @ 12:38)  SpO2: 100% (02-08-18 @ 12:38)  Wt(kg): --    02-07 @ 07:01  -  02-08 @ 07:00  --------------------------------------------------------  IN: 1100 mL / OUT: 0 mL / NET: 1100 mL    02-08 @ 07:01  -  02-08 @ 16:24  --------------------------------------------------------  IN: 748 mL / OUT: 0 mL / NET: 748 mL          PHYSICAL EXAM:  Constitutional: NAD  Neck: No JVD  Respiratory: CTAB, no wheezes, rales or rhonchi  Cardiovascular: S1, S2, RRR  Gastrointestinal: BS+, soft, NT/ND  Extremities: No peripheral edema    Hospital Medications:   MEDICATIONS  (STANDING):  aspirin enteric coated 81 milliGRAM(s) Oral daily  atorvastatin 40 milliGRAM(s) Oral at bedtime  dextrose 5%. 1000 milliLiter(s) (50 mL/Hr) IV Continuous <Continuous>  dextrose 50% Injectable 12.5 Gram(s) IV Push once  dextrose 50% Injectable 25 Gram(s) IV Push once  dextrose 50% Injectable 25 Gram(s) IV Push once  famotidine    Tablet 20 milliGRAM(s) Oral daily  ferrous    sulfate 325 milliGRAM(s) Oral daily  furosemide    Tablet 40 milliGRAM(s) Oral daily  heparin  Injectable 5000 Unit(s) SubCutaneous every 12 hours  insulin glargine Injectable (LANTUS) 20 Unit(s) SubCutaneous every morning  insulin glargine Injectable (LANTUS) 68 Unit(s) SubCutaneous at bedtime  insulin lispro (HumaLOG) corrective regimen sliding scale   SubCutaneous three times a day before meals  insulin lispro (HumaLOG) corrective regimen sliding scale   SubCutaneous at bedtime  insulin lispro Injectable (HumaLOG) 24 Unit(s) SubCutaneous three times a day before meals  levothyroxine 50 MICROGram(s) Oral daily  metoprolol succinate ER 12.5 milliGRAM(s) Oral daily  montelukast 10 milliGRAM(s) Oral at bedtime  sodium bicarbonate 650 milliGRAM(s) Oral three times a day  sodium chloride 0.9% lock flush 3 milliLiter(s) IV Push every 8 hours  ticagrelor 90 milliGRAM(s) Oral two times a day      LABS:  02-08    140  |  99  |  32<H>  ----------------------------<  86  3.7   |  24  |  1.90<H>    Ca    8.9      08 Feb 2018 08:05  Phos  3.3     02-08  Mg     2.1     02-08      Creatinine Trend: 1.90 <--, 1.81 <--, 1.66 <--, 1.60 <--, 1.64 <--, 1.52 <--, 1.70 <--    Phosphorus Level, Serum: 3.3 mg/dL (02-08 @ 08:05)                              11.1   11.40 )-----------( 347      ( 08 Feb 2018 08:05 )             35.3     Urine Studies:  Urinalysis - [01-30-18 @ 15:20]      Color PLYEL / Appearance CLEAR / SG 1.018 / pH 6.5      Gluc >1000 / Ketone NEGATIVE  / Bili NEGATIVE / Urobili NORMAL       Blood TRACE / Protein 150 / Leuk Est NEGATIVE / Nitrite NEGATIVE      RBC 0-2 / WBC 0-2 / Hyaline  / Gran  / Sq Epi OCC / Non Sq Epi  / Bacteria       Iron 40, TIBC 377, %sat --      [01-20-18 @ 06:06]  Ferritin 38.35      [01-20-18 @ 06:06]  HbA1c 9.1      [11-25-17 @ 06:30]  TSH 0.55      [01-19-18 @ 06:45]  Lipid: chol 130, , HDL 30, LDL 70      [01-19-18 @ 06:45]        RADIOLOGY & ADDITIONAL STUDIES:

## 2018-02-08 NOTE — PROGRESS NOTE ADULT - SUBJECTIVE AND OBJECTIVE BOX
SELVIN CLAIRE 72y [id number]    Patient is a 72y old  Female who presents with a chief complaint of SOB (25 Jan 2018 15:14)      Follow Up/CC:  leukocytosis    Interval History/ROS: feels ok    Allergies    No Known Allergies    Intolerances        ANTIMICROBIALS:      MEDICATIONS  (STANDING):  aspirin enteric coated 81 milliGRAM(s) Oral daily  atorvastatin 40 milliGRAM(s) Oral at bedtime  dextrose 5%. 1000 milliLiter(s) (50 mL/Hr) IV Continuous <Continuous>  dextrose 50% Injectable 12.5 Gram(s) IV Push once  dextrose 50% Injectable 25 Gram(s) IV Push once  dextrose 50% Injectable 25 Gram(s) IV Push once  famotidine    Tablet 20 milliGRAM(s) Oral daily  ferrous    sulfate 325 milliGRAM(s) Oral daily  furosemide    Tablet 40 milliGRAM(s) Oral daily  heparin  Injectable 5000 Unit(s) SubCutaneous every 12 hours  insulin glargine Injectable (LANTUS) 20 Unit(s) SubCutaneous every morning  insulin glargine Injectable (LANTUS) 68 Unit(s) SubCutaneous at bedtime  insulin lispro (HumaLOG) corrective regimen sliding scale   SubCutaneous three times a day before meals  insulin lispro (HumaLOG) corrective regimen sliding scale   SubCutaneous at bedtime  insulin lispro Injectable (HumaLOG) 24 Unit(s) SubCutaneous three times a day before meals  levothyroxine 50 MICROGram(s) Oral daily  metoprolol succinate ER 12.5 milliGRAM(s) Oral daily  montelukast 10 milliGRAM(s) Oral at bedtime  sodium bicarbonate 650 milliGRAM(s) Oral three times a day  sodium chloride 0.9% lock flush 3 milliLiter(s) IV Push every 8 hours  ticagrelor 90 milliGRAM(s) Oral two times a day    MEDICATIONS  (PRN):  acetaminophen   Tablet 650 milliGRAM(s) Oral every 6 hours PRN For Temp greater than 38 C (100.4 F)  acetaminophen   Tablet. 650 milliGRAM(s) Oral every 6 hours PRN Mild, moderate and severe pain  benzocaine 15 mG/menthol 3.6 mG Lozenge 1 Lozenge Oral every 6 hours PRN Sore Throat  dextrose Gel 1 Dose(s) Oral once PRN Blood Glucose LESS THAN 70 milliGRAM(s)/deciliter  glucagon  Injectable 1 milliGRAM(s) IntraMuscular once PRN Glucose LESS THAN 70 milligrams/deciliter  guaiFENesin    Syrup 100 milliGRAM(s) Oral every 6 hours PRN Cough  sodium chloride 0.65% Nasal 1 Spray(s) Both Nostrils three times a day PRN Nasal Congestion        Vital Signs Last 24 Hrs  T(C): 36.6 (08 Feb 2018 12:38), Max: 36.8 (08 Feb 2018 05:30)  T(F): 97.9 (08 Feb 2018 12:38), Max: 98.2 (08 Feb 2018 05:30)  HR: 83 (08 Feb 2018 16:05) (80 - 88)  BP: 108/62 (08 Feb 2018 12:38) (107/63 - 113/60)  BP(mean): --  RR: 17 (08 Feb 2018 12:38) (16 - 17)  SpO2: 100% (08 Feb 2018 12:38) (100% - 100%)    CBC Full  -  ( 08 Feb 2018 08:05 )  WBC Count : 11.40 K/uL  Hemoglobin : 11.1 g/dL  Hematocrit : 35.3 %  Platelet Count - Automated : 347 K/uL  Mean Cell Volume : 83.6 fL  Mean Cell Hemoglobin : 26.3 pg  Mean Cell Hemoglobin Concentration : 31.4 %  Auto Neutrophil # : 6.07 K/uL  Auto Lymphocyte # : 3.93 K/uL  Auto Monocyte # : 0.88 K/uL  Auto Eosinophil # : 0.36 K/uL  Auto Basophil # : 0.04 K/uL  Auto Neutrophil % : 53.1 %  Auto Lymphocyte % : 34.5 %  Auto Monocyte % : 7.7 %  Auto Eosinophil % : 3.2 %  Auto Basophil % : 0.4 %    02-08    140  |  99  |  32<H>  ----------------------------<  86  3.7   |  24  |  1.90<H>    Ca    8.9      08 Feb 2018 08:05  Phos  3.3     02-08  Mg     2.1     02-08            MICROBIOLOGY:  BLOOD VENOUS  01-30-18 --  --  --              v            RADIOLOGY

## 2018-02-08 NOTE — PROGRESS NOTE ADULT - SUBJECTIVE AND OBJECTIVE BOX
Patient is a 72y old  Female who presents with a chief complaint of SOB (25 Jan 2018 15:14)      Any change in ROS: pt is doing ok   no SOB at rest    ZEE     MEDICATIONS  (STANDING):  aspirin enteric coated 81 milliGRAM(s) Oral daily  atorvastatin 40 milliGRAM(s) Oral at bedtime  dextrose 5%. 1000 milliLiter(s) (50 mL/Hr) IV Continuous <Continuous>  dextrose 50% Injectable 12.5 Gram(s) IV Push once  dextrose 50% Injectable 25 Gram(s) IV Push once  dextrose 50% Injectable 25 Gram(s) IV Push once  famotidine    Tablet 20 milliGRAM(s) Oral daily  ferrous    sulfate 325 milliGRAM(s) Oral daily  furosemide    Tablet 40 milliGRAM(s) Oral daily  heparin  Injectable 5000 Unit(s) SubCutaneous every 12 hours  insulin glargine Injectable (LANTUS) 20 Unit(s) SubCutaneous every morning  insulin glargine Injectable (LANTUS) 68 Unit(s) SubCutaneous at bedtime  insulin lispro (HumaLOG) corrective regimen sliding scale   SubCutaneous three times a day before meals  insulin lispro (HumaLOG) corrective regimen sliding scale   SubCutaneous at bedtime  insulin lispro Injectable (HumaLOG) 24 Unit(s) SubCutaneous three times a day before meals  levothyroxine 50 MICROGram(s) Oral daily  metoprolol succinate ER 12.5 milliGRAM(s) Oral daily  montelukast 10 milliGRAM(s) Oral at bedtime  sodium bicarbonate 650 milliGRAM(s) Oral three times a day  sodium chloride 0.9% lock flush 3 milliLiter(s) IV Push every 8 hours  ticagrelor 90 milliGRAM(s) Oral two times a day    MEDICATIONS  (PRN):  acetaminophen   Tablet 650 milliGRAM(s) Oral every 6 hours PRN For Temp greater than 38 C (100.4 F)  acetaminophen   Tablet. 650 milliGRAM(s) Oral every 6 hours PRN Mild, moderate and severe pain  benzocaine 15 mG/menthol 3.6 mG Lozenge 1 Lozenge Oral every 6 hours PRN Sore Throat  dextrose Gel 1 Dose(s) Oral once PRN Blood Glucose LESS THAN 70 milliGRAM(s)/deciliter  glucagon  Injectable 1 milliGRAM(s) IntraMuscular once PRN Glucose LESS THAN 70 milligrams/deciliter  guaiFENesin    Syrup 100 milliGRAM(s) Oral every 6 hours PRN Cough  sodium chloride 0.65% Nasal 1 Spray(s) Both Nostrils three times a day PRN Nasal Congestion    Vital Signs Last 24 Hrs  T(C): 36.8 (08 Feb 2018 05:30), Max: 37.1 (07 Feb 2018 12:55)  T(F): 98.2 (08 Feb 2018 05:30), Max: 98.7 (07 Feb 2018 12:55)  HR: 80 (08 Feb 2018 05:30) (80 - 94)  BP: 109/66 (08 Feb 2018 05:30) (107/63 - 113/60)  BP(mean): --  RR: 16 (08 Feb 2018 05:30) (16 - 16)  SpO2: 100% (08 Feb 2018 05:30) (100% - 100%)    I&O's Summary    07 Feb 2018 07:01  -  08 Feb 2018 07:00  --------------------------------------------------------  IN: 1100 mL / OUT: 0 mL / NET: 1100 mL          Physical Exam:   GENERAL: NAD, well-groomed, well-developed  HEENT: MOHSEN/   Atraumatic, Normocephalic  ENMT: No tonsillar erythema, exudates, or enlargement; Moist mucous membranes, Good dentition, No lesions  NECK: Supple, No JVD, Normal thyroid  CHEST/LUNG: Clear to auscultaion, ; No rales, rhonchi, wheezing, or rubs  CVS: Regular rate and rhythm; No murmurs, rubs, or gallops  GI: : Soft, Nontender, Nondistended; Bowel sounds present  NERVOUS SYSTEM:  Alert & Oriented X3  EXTREMITIES:  2+ Peripheral Pulses, No clubbing, cyanosis, or edema  LYMPH: No lymphadenopathy noted  SKIN: No rashes or lesions  ENDOCRINOLOGY: No Thyromegaly  PSYCH: Appropriate    Labs:                              11.1   11.40 )-----------( 347      ( 08 Feb 2018 08:05 )             35.3                         11.3   11.14 )-----------( 361      ( 07 Feb 2018 07:05 )             35.6                         9.9    8.79  )-----------( 312      ( 06 Feb 2018 07:25 )             31.1                         9.4    9.24  )-----------( 278      ( 05 Feb 2018 05:32 )             29.2     02-08    140  |  99  |  32<H>  ----------------------------<  86  3.7   |  24  |  1.90<H>  02-07    142  |  101  |  31<H>  ----------------------------<  123<H>  3.8   |  24  |  1.81<H>  02-06    142  |  103  |  33<H>  ----------------------------<  168<H>  4.4   |  22  |  1.66<H>  02-05    142  |  102  |  29<H>  ----------------------------<  110<H>  3.4<L>   |  25  |  1.60<H>    Ca    8.9      08 Feb 2018 08:05  Ca    9.0      07 Feb 2018 07:05  Phos  3.3     02-08  Mg     2.1     02-08  Mg     2.2     02-07      CAPILLARY BLOOD GLUCOSE      POCT Blood Glucose.: 112 mg/dL (08 Feb 2018 08:59)  POCT Blood Glucose.: 106 mg/dL (07 Feb 2018 22:29)  POCT Blood Glucose.: 132 mg/dL (07 Feb 2018 18:01)  POCT Blood Glucose.: 286 mg/dL (07 Feb 2018 12:55)            Cultures:         < from: Xray Chest 1 View- PORTABLE-Routine (02.01.18 @ 08:05) >  COMPARISON: AP chest x-ray from January 30, 2018. CT scan of the chest   from January 31, 2018.    TECHNIQUE:   AP Portable chest x-ray.    INTERPRETATION:     Heart size and the mediastinum cannot be accurately evaluated on this   projection. Coronary artery calcification and/or stent is seen.  Median sternotomy sutures and surgical clips are again seen.  There is minimal linear atelectasis versus scar in the left lower lung.   The lungs are otherwise clear.  No pleural effusion is noted.  The visualized bony structures appear intact.            IMPRESSION:  Minimal linear atelectasis versus scar in the left lower   lung. Otherwise clear lungs.   Please see report of the CT scan of the chest for any further detail.                  CATRACHITA KEENAN M.D., ATTENDING RADIOLOGIST  This document has been electronically signed. Feb 1 2018 12:00PM        < end of copied text >                      Studies  Chest X-RAY  CT SCAN Chest   Venous Dopplers: LE:   CT Abdomen  Others

## 2018-02-08 NOTE — PROGRESS NOTE ADULT - SUBJECTIVE AND OBJECTIVE BOX
Subjective:  report SOB while walking around room.  no CP.    MEDICATIONS  (STANDING):  aspirin enteric coated 81 milliGRAM(s) Oral daily  atorvastatin 40 milliGRAM(s) Oral at bedtime  famotidine    Tablet 20 milliGRAM(s) Oral daily  ferrous    sulfate 325 milliGRAM(s) Oral daily  furosemide    Tablet 40 milliGRAM(s) Oral daily  heparin  Injectable 5000 Unit(s) SubCutaneous every 12 hours  insulin glargine Injectable (LANTUS) 20 Unit(s) SubCutaneous every morning  insulin glargine Injectable (LANTUS) 68 Unit(s) SubCutaneous at bedtime  insulin lispro (HumaLOG) corrective regimen sliding scale   SubCutaneous three times a day before meals  insulin lispro (HumaLOG) corrective regimen sliding scale   SubCutaneous at bedtime  insulin lispro Injectable (HumaLOG) 24 Unit(s) SubCutaneous three times a day before meals  levothyroxine 50 MICROGram(s) Oral daily  metoprolol succinate ER 12.5 milliGRAM(s) Oral daily  montelukast 10 milliGRAM(s) Oral at bedtime  sodium bicarbonate 650 milliGRAM(s) Oral three times a day  sodium chloride 0.9% lock flush 3 milliLiter(s) IV Push every 8 hours  ticagrelor 90 milliGRAM(s) Oral two times a day    LABS:                        11.1   11.40 )-----------( 347      ( 08 Feb 2018 08:05 )             35.3     140  |  99  |  32<H>  ----------------------------<  86  3.7   |  24  |  1.90<H>    Ca    8.9      08 Feb 2018 08:05  Phos  3.3     02-08  Mg     2.1     02-08  Creatinine Trend: 1.90<--, 1.81<--, 1.66<--, 1.60<--, 1.64<--, 1.52<--     PHYSICAL EXAM  Vital Signs Last 24 Hrs  T(C): 36.8 (08 Feb 2018 05:30), Max: 37.1 (07 Feb 2018 12:55)  T(F): 98.2 (08 Feb 2018 05:30), Max: 98.7 (07 Feb 2018 12:55)  HR: 80 (08 Feb 2018 05:30) (80 - 94)  BP: 109/66 (08 Feb 2018 05:30) (107/63 - 113/60)  RR: 16 (08 Feb 2018 05:30) (16 - 16)  SpO2: 100% (08 Feb 2018 05:30) (100% - 100%)    Cardiovascular: S1S2 RRR  Respiratory: CTA BL   Gastrointestinal:  Soft, Non-tender, + BS	  Extremities No edema, cyanosis or clubbing  B/L LE's     DIAGNOSTIC DATA:     TELEMETRY: SR 	       < from: Cardiac Cath Lab - Adult (10.13.17 @ 10:40) >  VENTRICLES: No left ventriculogram was performed.  CORONARY VESSELS: The coronary circulation is right dominant.  LM:   --  LM: Normal.  LAD:   --  Proximal LAD: There was a diffuse 60 % stenosis.  --  Mid LAD: There was a 100 % stenosis with the distal vessel being filled  via LIMA-LAD.  CX:   --  Circumflex: The vessel was small sized. Angiography showed mild  atherosclerosis withno flow limiting lesions.  --  OM1: Angiography showed mild atherosclerosis with no flow limiting  lesions.  RI:   --  Ostial ramus intermedius: There was a tubular 80 % stenosis.  There was PARUL grade 3 flow through the vessel (brisk flow).  RCA:   --  Proximal RCA: Angiography showed mild atherosclerosis with no  flow limiting lesions.  --  Mid RCA: There was a 100 % stenosis with the distal vessel being filled  via collaterals. This lesion is a chronic total occlusion.  GRAFTS:   --  Graft to the LAD: The graft was a LIMA. Graft angiography  showed minor luminal irregularities. Distal vessel angiography showed  minor luminal irregularities.  AORTA: Ascending aorta: Normal. No additional grafts visualized in  aortogram.  COMPLICATIONS: There were no complications.  DIAGNOSTIC RECOMMENDATIONS: Coronary angiogram demonstrates patent  LIMA-LAD, closed SVG grafts, and a severe stenosis in the ostial Ramus.  Will perform staged PCI to RI when Cr stable and renally optimized.  Prepared and signed by  Corie Ga M.D.  Signed 10/17/2017 13:49:15    < end of copied text >    < from: Cardiac Cath Lab - Adult (11.17.17 @ 11:48) >  PROCEDURE:  --  Intervention on ramus intermedius: drug-eluting stent.    VENTRICLES: No LV gram was performed; however, a recent echocardiogram  demonstrated normal global and regional LV function.  CORONARY VESSELS: The coronary circulation is right dominant.  LM:   --  LM: Angiography showed minor luminal irregularities with no flow  limiting lesions.  LAD:   --  Ostial LAD: There was a 100 % stenosis.  CX:   --  Circumflex: This vessel was not injected, but was visualized  during a prior cardiac catheterization.  RI:   --  Ramus intermedius: There was a 95 % stenosis.  RCA:   --  RCA: This vessel was not injected.  COMPLICATIONS: There were no complications.  DIAGNOSTIC RECOMMENDATIONS: The patient should continue with the present  medications. will add plavix 75mg po once a day/ will add ECASA 325mg po  once day.  Prepared and signed by  Roberta Quick M.D.  Signed 11/17/2017 13:16:01    < end of copied text >    < from: TTE with Doppler (w/Cont) (11.25.17 @ 12:33) >  CONCLUSIONS:  1. Mitral annular calcification, otherwise normal mitral  valve. Mild mitral regurgitation.  2. Normal left ventricular internal dimensions and wall  thicknesses.  3. Endocardium not well visualized; grossly moderate to  severe segmental left ventricularsystolic dysfunction.  Hypokinesis of the inferior, lateral, and inferolateral  walls.  Endocardial visualization enhanced with intravenous  injection of echo contrast (Definity).  No LV thrombus  seen.  4. The right ventricle is not well visualized;grossly  normal right ventricular systolic function.  ------------------------------------------------------------------------  Confirmed on  11/25/2017 - 14:44:41 by Jose Lucia M.D.    < end of copied text >    < from: Cardiac Cath Lab - Adult (12.05.17 @ 12:48) >  VENTRICLES: No LV gram was performed; however, a recent echocardiogram  demonstrated an EF of 36 %.  CORONARY VESSELS: The coronary circulation is right dominant.  LM:   --  Distal left main: Angiography showed minor luminal irregularities  with no flow limiting lesions.  LAD:   --  Mid LAD: There was a 100 % stenosis with the distal vessel being  filled via LIMA-LAD.  CX:   --  Proximal circumflex: There was a tubular 20 % stenosis.  --  Mid circumflex: Angiography showed minor luminal irregularities with no  flow limiting lesions.  --  Distal circumflex: Angiography showed minor luminal irregularities with  no flow limiting lesions.  --  OM1: The vessel was small sized. There was a discrete 60 % stenosis.  RI:   --  Ostial ramus intermedius: Angiography showed minor luminal  irregularities with no flow limiting lesions. There was no significant  restenosis in RI stent.  --  Proximal ramus intermedius: Angiography showed minor luminal  irregularities with no flow limiting lesions.  --  Mid ramus intermedius: There was a tubular 80 % stenosis. The lesion  was associated with a small filling defect consistent with thrombus.  RCA:   --  Mid RCA: There was a 100 % stenosis with the distal vessel being  filled via collaterals from the LAD.  GRAFTS:   --  Graft to the LAD: The graft was a LIMA. Graft angiography  showed minor luminal irregularities. Distal vessel angiography showed  minor luminalirregularities.  COMPLICATIONS: There were no complications.  DIAGNOSTIC RECOMMENDATIONS: Coronary angiogram demonstrates a severe  stenosis in the ramus intermedius that is the cause of the patient's  clinical presentation. Will therefore perform PCI to the RI.  INTERVENTIONAL RECOMMENDATIONS: S/p successful CORNELIA to the Ramus  Intermedius. The patient should continue with ASA and Brilinta for at  least 12 months.  Prepared and signed by  Corie Ga M.D.  Signed 12/06/2017 17:06:30    < from: CT Chest No Cont (01.20.18 @ 09:55) >  IMPRESSION:   Mild pulmonary edema with small simple bilateral pleural effusions, right   greater than left, with no evidence of loculation.    < end of copied text >    < from: TTE with Doppler (w/Cont) (01.23.18 @ 12:31) >  CONCLUSIONS:  1. Mitral annular calcification, otherwise normal mitral  valve. Mild-moderate mitral regurgitation.  2. Calcified trileaflet aortic valve with normal opening.  Mild aortic regurgitation.  3. Normal left ventricular internal dimensions and wall  thicknesses.  4. Endocardium not well visualized; grossly severe global  left ventricular systolic dysfunction.  Endocardial  visualization enhanced with intravenous injection of echo  contrast (Definity).  5. The right ventricle is not well visualized; grossly  normal right ventricular systolic function.  *** Compared with echocardiogram of 11/25/2017, no  significant changes noted.  ------------------------------------------------------------------------  Confirmed on  1/23/2018 - 14:35:49 by Jose Lucia M.D.    < end of copied text >    < from: Nuclear Stress Test-Pharmacologic (01.28.18 @ 12:31) >  IMPRESSIONS:Abnormal Study  * Myocardial Perfusion SPECT results are abnormal.  * There is a medium sized, mild to moderate defect in  anterior wall that is reversible, suggestive of  ischemia.There are large, moderate to severe defects in  inferolateral, lateral walls that are fixed, suggestive of  infarct.  * Post-stress gated wall motion analysis was performed  (LVEF = 33 %;LVEDV = 116 ml.), revealing severe overall  hypokinesis. There was focal inferiolateral akinesis and  severe lateral hypokinesis with absence of systolic  thickening. The best motion was in the septum.  *** Compared with Nuclear/Stress test of 11/5/2014, the  observed defect are now more extensive, suggesting  progression of disease. Prior LVEF was 39% with EDV 77 mL.  ------------------------------------------------------------------------  Revised on  1/29/2018 - 16:44:19 by Lorenzo Covarrubias M.D.  Confirmed on  1/30/2018 - 15:45:44 by José Hansen M.D.  < end of copied text >    < from: CT Chest No Cont (01.31.18 @ 17:59) >  IMPRESSION:  Resolution of the previously noted bilateral pleural effusions and   groundglass opacities within both lungs when compared to previous exam.    < end of copied text >      ASSESSMENT/PLAN: 	72y Female w/ PMH HTN, CKD, Hyperlipidemia, DM-II, CAD s/p 3V CABG  (LIMA to LAD, SVG to OM, SVG to PDA) at [facility] 2014, s/p CORNELIA TO RI 11/17/17, NSTEMI 12/2017 s/p LHC on 12/5 and CORNELIA to [facility], TTE at that time revealed grossly moderate to severe LV dysfunction with hypokinesis of the inferior, lateral and inferolateral with an EF of 36% admitted with acute on chronic systolic CHF, s/p IV diureses, s/p NST as above, now s/p Flu and course of Tamiflu     --Not in decomp CHF by exam  --Pulm F/U noted  --suspect SOB, in part, can be due to deconditioning  --Given ongoing chest pain and NST as noted above, s/p LHC on 2/6  with no new OBS CAD  --Cont current meds  --Given rising creatinine, cont to monitor/ f/u with Renal team  -DC planning when ok from Renal perspective      Juliane Fitch PA-C  [facility]  [address], [address]   [place] [address]  office [phone]  pager [phone]

## 2018-02-08 NOTE — PROGRESS NOTE ADULT - ASSESSMENT
71 Female, with a PmHx of HTN, CKD, Hyperlipidemia, DM-II, CAD s/p CABG x 3, s/p stents, presenting to the [facility] ED c/o shortness of breath with HF with fever and URI symptoms

## 2018-02-08 NOTE — PROGRESS NOTE ADULT - ASSESSMENT
71 Female, with a PmHx of HTN, CKD, Hyperlipidemia, DM-II, CAD s/p CABG x 3, s/p stents, presenting to the [facility] ED c/o shortness of breath. Pt is being admitted to telemetry for acute on chronic systolic heart failure.  Now she is with new fever, as well as URI

## 2018-02-09 VITALS
SYSTOLIC BLOOD PRESSURE: 113 MMHG | OXYGEN SATURATION: 100 % | DIASTOLIC BLOOD PRESSURE: 61 MMHG | TEMPERATURE: 98 F | RESPIRATION RATE: 17 BRPM | HEART RATE: 83 BPM

## 2018-02-09 LAB
BUN SERPL-MCNC: 31 MG/DL — HIGH (ref 7–23)
CALCIUM SERPL-MCNC: 9.1 MG/DL — SIGNIFICANT CHANGE UP (ref 8.4–10.5)
CHLORIDE SERPL-SCNC: 103 MMOL/L — SIGNIFICANT CHANGE UP (ref 98–107)
CO2 SERPL-SCNC: 25 MMOL/L — SIGNIFICANT CHANGE UP (ref 22–31)
CREAT SERPL-MCNC: 1.72 MG/DL — HIGH (ref 0.5–1.3)
GLUCOSE BLDC GLUCOMTR-MCNC: 124 MG/DL — HIGH (ref 70–99)
GLUCOSE BLDC GLUCOMTR-MCNC: 163 MG/DL — HIGH (ref 70–99)
GLUCOSE BLDC GLUCOMTR-MCNC: 246 MG/DL — HIGH (ref 70–99)
GLUCOSE SERPL-MCNC: 134 MG/DL — HIGH (ref 70–99)
HCT VFR BLD CALC: 31.4 % — LOW (ref 34.5–45)
HGB BLD-MCNC: 9.7 G/DL — LOW (ref 11.5–15.5)
MAGNESIUM SERPL-MCNC: 2.2 MG/DL — SIGNIFICANT CHANGE UP (ref 1.6–2.6)
MCHC RBC-ENTMCNC: 26.2 PG — LOW (ref 27–34)
MCHC RBC-ENTMCNC: 30.9 % — LOW (ref 32–36)
MCV RBC AUTO: 84.9 FL — SIGNIFICANT CHANGE UP (ref 80–100)
NRBC # FLD: 0 — SIGNIFICANT CHANGE UP
PLATELET # BLD AUTO: 307 K/UL — SIGNIFICANT CHANGE UP (ref 150–400)
PMV BLD: 9.1 FL — SIGNIFICANT CHANGE UP (ref 7–13)
POTASSIUM SERPL-MCNC: 3.8 MMOL/L — SIGNIFICANT CHANGE UP (ref 3.5–5.3)
POTASSIUM SERPL-SCNC: 3.8 MMOL/L — SIGNIFICANT CHANGE UP (ref 3.5–5.3)
RBC # BLD: 3.7 M/UL — LOW (ref 3.8–5.2)
RBC # FLD: 15.9 % — HIGH (ref 10.3–14.5)
SODIUM SERPL-SCNC: 142 MMOL/L — SIGNIFICANT CHANGE UP (ref 135–145)
WBC # BLD: 9.35 K/UL — SIGNIFICANT CHANGE UP (ref 3.8–10.5)
WBC # FLD AUTO: 9.35 K/UL — SIGNIFICANT CHANGE UP (ref 3.8–10.5)

## 2018-02-09 PROCEDURE — 99232 SBSQ HOSP IP/OBS MODERATE 35: CPT

## 2018-02-09 RX ORDER — INSULIN GLARGINE 100 [IU]/ML
68 INJECTION, SOLUTION SUBCUTANEOUS
Qty: 1 | Refills: 0 | OUTPATIENT
Start: 2018-02-09 | End: 2018-03-10

## 2018-02-09 RX ORDER — INSULIN GLARGINE 100 [IU]/ML
65 INJECTION, SOLUTION SUBCUTANEOUS
Qty: 0 | Refills: 0 | COMMUNITY

## 2018-02-09 RX ORDER — POTASSIUM CHLORIDE 20 MEQ
10 PACKET (EA) ORAL ONCE
Qty: 0 | Refills: 0 | Status: COMPLETED | OUTPATIENT
Start: 2018-02-09 | End: 2018-02-09

## 2018-02-09 RX ORDER — ACETAMINOPHEN 500 MG
2 TABLET ORAL
Qty: 0 | Refills: 0 | COMMUNITY
Start: 2018-02-09 | End: 2018-02-16

## 2018-02-09 RX ORDER — FERROUS SULFATE 325(65) MG
1 TABLET ORAL
Qty: 30 | Refills: 0
Start: 2018-02-09 | End: 2018-03-10

## 2018-02-09 RX ORDER — INSULIN GLARGINE 100 [IU]/ML
20 INJECTION, SOLUTION SUBCUTANEOUS
Qty: 1 | Refills: 0 | OUTPATIENT
Start: 2018-02-09 | End: 2018-03-10

## 2018-02-09 RX ORDER — SODIUM BICARBONATE 1 MEQ/ML
1 SYRINGE (ML) INTRAVENOUS
Qty: 21 | Refills: 0 | OUTPATIENT
Start: 2018-02-09 | End: 2018-02-15

## 2018-02-09 RX ADMIN — Medication 24 UNIT(S): at 09:30

## 2018-02-09 RX ADMIN — SODIUM CHLORIDE 3 MILLILITER(S): 9 INJECTION INTRAMUSCULAR; INTRAVENOUS; SUBCUTANEOUS at 13:55

## 2018-02-09 RX ADMIN — Medication 650 MILLIGRAM(S): at 06:55

## 2018-02-09 RX ADMIN — Medication 4: at 18:43

## 2018-02-09 RX ADMIN — Medication 10 MILLIEQUIVALENT(S): at 13:07

## 2018-02-09 RX ADMIN — Medication 650 MILLIGRAM(S): at 13:07

## 2018-02-09 RX ADMIN — TICAGRELOR 90 MILLIGRAM(S): 90 TABLET ORAL at 06:55

## 2018-02-09 RX ADMIN — Medication 12.5 MILLIGRAM(S): at 06:55

## 2018-02-09 RX ADMIN — Medication 2: at 13:08

## 2018-02-09 RX ADMIN — SODIUM CHLORIDE 3 MILLILITER(S): 9 INJECTION INTRAMUSCULAR; INTRAVENOUS; SUBCUTANEOUS at 06:54

## 2018-02-09 RX ADMIN — Medication 325 MILLIGRAM(S): at 13:08

## 2018-02-09 RX ADMIN — Medication 50 MICROGRAM(S): at 06:55

## 2018-02-09 RX ADMIN — Medication 24 UNIT(S): at 13:08

## 2018-02-09 RX ADMIN — TICAGRELOR 90 MILLIGRAM(S): 90 TABLET ORAL at 18:44

## 2018-02-09 RX ADMIN — Medication 24 UNIT(S): at 18:44

## 2018-02-09 RX ADMIN — Medication 81 MILLIGRAM(S): at 13:07

## 2018-02-09 RX ADMIN — Medication 40 MILLIGRAM(S): at 06:55

## 2018-02-09 RX ADMIN — INSULIN GLARGINE 20 UNIT(S): 100 INJECTION, SOLUTION SUBCUTANEOUS at 09:30

## 2018-02-09 RX ADMIN — FAMOTIDINE 20 MILLIGRAM(S): 10 INJECTION INTRAVENOUS at 13:08

## 2018-02-09 RX ADMIN — HEPARIN SODIUM 5000 UNIT(S): 5000 INJECTION INTRAVENOUS; SUBCUTANEOUS at 06:55

## 2018-02-09 NOTE — PROGRESS NOTE ADULT - ATTENDING COMMENTS
Agree with above assessment and plan as outlined above.    - f/u echo    Vargas Adame MD, FACC  [facility], [facility]  [address] [place] [address]  PHONE:  [phone]  BEEPER : [phone]
CARDIOLOGY ATTENDING    Patient seen and examined. Agree with above. Cath prior to discharge after she recovers from the flu.
Patient seen and examined, agree with above assessment and plan as transcribed above.    - awaiting echo    Vargas Adame MD, [facility], [facility]  [address] [place] [address]  PHONE:  [phone]  BEEPER : [phone]
Patient seen and examined.  Agree with above.   -Continue with IV lasix  -check NST    Corie Ga MD  [facility]  [address] [facility], [address]  [place] [address]  office: [phone]  pager: [phone]
Patient seen and examined.  Agree with above.   -Continue with lasix  -check nst    Corie Ga MD  [facility]  [address] [facility], [address]  [place] [address]  office: [phone]  pager: [phone]
Patient seen and examined.  Agree with above.   -Continue with treatment of flu  -cath when medically optimized    Corie Ga MD  [facility]  [address] [facility], [address]  [place] [address]  office: [phone]  pager: [phone]
Patient seen and examined.  Agree with above.   -Lungs sound clear today  -change lasix to po per renal  -f/u TTE    Corie Ga MD  [facility]  [address] [facility], [address]  [place] [address]  office: [phone]  pager: [phone]
Patient seen and examined.  Agree with above.   -NST abnormal  -continue with ID workup of fevers  -pulmonary eval with Dr. Uribe  -further cardiac workup when medically optimized    Corie Ga MD  [facility]  [address] [place] [address], [address]  [place] [address]  office: [phone]  pager: [phone]
Patient seen and examined.  Agree with above.   -No further cardiac workup needed at this tasha  -dc home when ok with ID and renal    Croie Ga MD  [facility]  [address] [facility], [address]  [place] [address]  office: [phone]  pager: [phone]
Patient seen and examined.  Agree with above.   -No further cardiac workup needed at this time  -WBC resolved, no further ID workup needed per ID  -Cr improving  -DC home    Corie Ga MD  [facility]  [address] [place] [address], [address]  [place] [address]  office: [phone]  pager: [phone]
Patient seen and examined.  Agree with above.   -No further cardiac workup needed at this time  -dc planning if ok with renal, pulmonary, and ID    Corie Ga MD  [facility]  [address] [facility], [address]  [place] [address]  office: [phone]  pager: [phone]
Patient seen and examined.  Agree with above.   -Pt for cath today to evaluate abnormal NST    Corie Ga MD  [facility]  [address] [facility], [address]  [place] [address]  office: [phone]  pager: [phone]
Patient seen and examined.  Agree with above.   -Pt. awaiting rest imaging  -continue with echo Ga MD  [facility]  [address] [facility], [address]  [place] [address]  office: [phone]  pager: [phone]
Patient seen and examined.  Agree with above.   -Pt. now with influenza  -further cardiac workup when patient recovers from influenza  -ID follow up    Corie Ga MD  [facility]  [address] [facility], [address]  [place] [address]  office: [phone]  pager: [phone]
Patient seen and examined.  Agree with above.   -Pt. still volume overloaded  -continue with iv diuresis  -eventual nst when medically optimized    Corie Ga MD  [facility]  [address] [place] [address], [address]  [place] [address]  office: [phone]  pager: [phone]
Patient seen and examined.  Agree with above.   -Pt. to have rest imaging for nst - f/u results  -pt. spiked temperatures - check RVP and ID consult    Corie Ga MD  [facility]  [address] [place] [address], [address] [place] [address]  office: [phone]  pager: [phone]
Patient seen and examined.  Agree with above.   -Pt. with sob and chest pressure today  -on exam, she is fluid overloaded with crackles at bases which is the likely cause of symptoms  -give Lasix 40mg IV X 1  -discussed with tele pa  -monitor for improvement     Corie Ga MD  [facility]  [address] [facility], [address]  [place] [address]  office: [phone]  pager: [phone]
Patient seen and examined.  Agree with above.   -appreciate renal follow up - continue with lasix  -check tte    Corie Ga MD  [facility]  [address] [facility], [address]  [place] [address]  office: [phone]  pager: [phone]
Patient seen and examined.  Agree with above.   -f/u NST    Corie Ga MD  [facility]  [address] [facility], [address]  [place] [address]  office: [phone]  pager: [phone]
CARDIOLOGY ATTENDING    Patient seen and examined. Agree with above. Cath prior to discharge after she recovers from the flu.
Noman Newberry  Attending Physician   Division of Infectious Disease  Pager #[phone]  After 5pm/weekend or no response, call #[phone]    I am away on 2/1 and will return 2/2. ID available #[phone].
Noman Newberry  Attending Physician   Division of Infectious Disease  Pager #[phone]  After 5pm/weekend or no response, call #[phone]    Will sign off, recall ID if needed #[phone].
finishing Tamiflu course tomorrow: she is till weak, but overall she has improved.  2/5: feels much better: per family at bedside;  2/6: ct scan chest with resolution of chf: for cath today
finishing Tamiflu course tomorrow: she is till weak, but overall she has improved.
finishing Tamiflu course tomorrow: she is till weak, but overall she has improved.  2/5: feels much better: per family at bedside;
finishing Tamiflu course tomorrow: she is till weak, but overall she has improved.  2/5: feels much better: per family at bedside;  2/6: ct scan chest with resolution of chf: for cath today  2/7: improved clinically
finishing Tamiflu course tomorrow: she is till weak, but overall she has improved.  2/5: feels much better: per family at bedside;  2/6: ct scan chest with resolution of chf: for cath today  2/7: improved clinically  2/8: cont current care: pt is improving:
finishing Tamiflu course tomorrow: she is till weak, but overall she has improved.  : feels much better: per family at bedside;  : ct scan chest with resolution of chf: for cath today  : improved clinically  : cont current care: pt is improvin/9: doing pretty good: pt does not have asthma: in last two years she had gotten albuterol which she uses prn only: She denies any wheezing:
Noman Newberry  Attending Physician   Division of Infectious Disease  Pager #[phone]  After 5pm/weekend or no response, call #[phone]
Noman Newberry  Attending Physician   Division of Infectious Disease  Pager #[phone]  After 5pm/weekend or no response, call #[phone]    Will sign off, recall ID if needed #[phone].
Noman Newberry  Attending Physician   Division of Infectious Disease  Pager #[phone]  After 5pm/weekend or no response, call #[phone]

## 2018-02-09 NOTE — PROGRESS NOTE ADULT - NSHPATTENDINGPLANDISCUSS_GEN_ALL_CORE
the patient and her daughter
Radha FIELD + daughter-in-law
pt + daughter-in-law
Cardio team and pt and daughter in law

## 2018-02-09 NOTE — PROGRESS NOTE ADULT - SUBJECTIVE AND OBJECTIVE BOX
Dr. Muhammad (Nephrology)  Office [phone]  Cell [phone]  Triny FIELD  Cell [phone]      Patient is a 72y old  Female who presents with a chief complaint of SOB (25 Jan 2018 15:14)      Patient seen and examined at bedside. No chest pain/sob    VITALS:  T(F): 98 (02-09-18 @ 06:53), Max: 98 (02-09-18 @ 06:53)  HR: 77 (02-09-18 @ 06:53)  BP: 116/60 (02-09-18 @ 06:53)  RR: 18 (02-09-18 @ 06:53)  SpO2: 100% (02-09-18 @ 06:53)  Wt(kg): --    02-08 @ 07:01  -  02-09 @ 07:00  --------------------------------------------------------  IN: 1128 mL / OUT: 0 mL / NET: 1128 mL          PHYSICAL EXAM:  Constitutional: NAD  Neck: No JVD  Respiratory: CTAB, no wheezes, rales or rhonchi  Cardiovascular: S1, S2, RRR  Gastrointestinal: BS+, soft, NT/ND  Extremities: No peripheral edema    Hospital Medications:   MEDICATIONS  (STANDING):  aspirin enteric coated 81 milliGRAM(s) Oral daily  atorvastatin 40 milliGRAM(s) Oral at bedtime  dextrose 5%. 1000 milliLiter(s) (50 mL/Hr) IV Continuous <Continuous>  dextrose 50% Injectable 12.5 Gram(s) IV Push once  dextrose 50% Injectable 25 Gram(s) IV Push once  dextrose 50% Injectable 25 Gram(s) IV Push once  famotidine    Tablet 20 milliGRAM(s) Oral daily  ferrous    sulfate 325 milliGRAM(s) Oral daily  furosemide    Tablet 40 milliGRAM(s) Oral daily  heparin  Injectable 5000 Unit(s) SubCutaneous every 12 hours  insulin glargine Injectable (LANTUS) 20 Unit(s) SubCutaneous every morning  insulin glargine Injectable (LANTUS) 68 Unit(s) SubCutaneous at bedtime  insulin lispro (HumaLOG) corrective regimen sliding scale   SubCutaneous three times a day before meals  insulin lispro (HumaLOG) corrective regimen sliding scale   SubCutaneous at bedtime  insulin lispro Injectable (HumaLOG) 24 Unit(s) SubCutaneous three times a day before meals  levothyroxine 50 MICROGram(s) Oral daily  metoprolol succinate ER 12.5 milliGRAM(s) Oral daily  montelukast 10 milliGRAM(s) Oral at bedtime  potassium chloride    Tablet ER 10 milliEquivalent(s) Oral once  sodium bicarbonate 650 milliGRAM(s) Oral three times a day  sodium chloride 0.9% lock flush 3 milliLiter(s) IV Push every 8 hours  ticagrelor 90 milliGRAM(s) Oral two times a day      LABS:  02-09    142  |  103  |  31<H>  ----------------------------<  134<H>  3.8   |  25  |  1.72<H>    Ca    9.1      09 Feb 2018 05:45  Phos  3.3     02-08  Mg     2.2     02-09      Creatinine Trend: 1.72 <--, 1.90 <--, 1.81 <--, 1.66 <--, 1.60 <--, 1.64 <--, 1.52 <--                                9.7    9.35  )-----------( 307      ( 09 Feb 2018 05:45 )             31.4     Urine Studies:  Urinalysis - [01-30-18 @ 15:20]      Color PLYEL / Appearance CLEAR / SG 1.018 / pH 6.5      Gluc >1000 / Ketone NEGATIVE  / Bili NEGATIVE / Urobili NORMAL       Blood TRACE / Protein 150 / Leuk Est NEGATIVE / Nitrite NEGATIVE      RBC 0-2 / WBC 0-2 / Hyaline  / Gran  / Sq Epi OCC / Non Sq Epi  / Bacteria     Urine Creatinine 58.68      [02-08-18 @ 18:57]  Urine Urea Nitrogen 360.9      [02-08-18 @ 18:57]    Iron 40, TIBC 377, %sat --      [01-20-18 @ 06:06]  Ferritin 38.35      [01-20-18 @ 06:06]  HbA1c 9.1      [11-25-17 @ 06:30]  TSH 0.55      [01-19-18 @ 06:45]  Lipid: chol 130, , HDL 30, LDL 70      [01-19-18 @ 06:45]        RADIOLOGY & ADDITIONAL STUDIES:

## 2018-02-09 NOTE — PROGRESS NOTE ADULT - PROBLEM SELECTOR PROBLEM 5
Hypothyroidism

## 2018-02-09 NOTE — PROGRESS NOTE ADULT - CARDIOVASCULAR DETAILS
positive S1/positive S2
positive S1/positive S2
positive S2/positive S1
positive S2/positive S1
positive S1/positive S2

## 2018-02-09 NOTE — PROGRESS NOTE ADULT - ASSESSMENT
71 Female, with a PmHx of HTN, CKD, Hyperlipidemia, DM-II, CAD s/p CABG x 3, s/p stents, presenting to the [facility] ED c/o shortness of breath. Pt is being admitted to telemetry for acute on chronic systolic heart failure.  Now she is with new fever, as well as URI

## 2018-02-09 NOTE — PROGRESS NOTE ADULT - PROBLEM SELECTOR PLAN 4
COntinue Synthroid, FU
cont home meds  2/3 home meds  2/6: cont current meds
COntinue Synthroid, FU
cont home meds
cont home meds
cont home meds  2/3 home meds
cont home meds  2/3 home meds  2/4
cont home meds  2/3 home meds  2/4
cont home meds  2/3 home meds  2/6: cont current meds

## 2018-02-09 NOTE — PROGRESS NOTE ADULT - RESPIRATORY
Breath Sounds equal & clear to percussion & auscultation, no accessory muscle use
Breath Sounds equal & clear to percussion & auscultation, no accessory muscle use
detailed exam

## 2018-02-09 NOTE — PROGRESS NOTE ADULT - CONSTITUTIONAL
Well-developed, well nourished
Well-developed, well nourished
detailed exam

## 2018-02-09 NOTE — PROGRESS NOTE ADULT - SUBJECTIVE AND OBJECTIVE BOX
SELVIN CLAIRE 72y [id number]    Patient is a 72y old  Female who presents with a chief complaint of SOB (25 Jan 2018 15:14)      Follow Up/CC:  fever    Interval History/ROS: feels ok, ZEE+    Allergies    No Known Allergies    Intolerances        ANTIMICROBIALS:      MEDICATIONS  (STANDING):  aspirin enteric coated 81 milliGRAM(s) Oral daily  atorvastatin 40 milliGRAM(s) Oral at bedtime  dextrose 5%. 1000 milliLiter(s) (50 mL/Hr) IV Continuous <Continuous>  dextrose 50% Injectable 12.5 Gram(s) IV Push once  dextrose 50% Injectable 25 Gram(s) IV Push once  dextrose 50% Injectable 25 Gram(s) IV Push once  famotidine    Tablet 20 milliGRAM(s) Oral daily  ferrous    sulfate 325 milliGRAM(s) Oral daily  furosemide    Tablet 40 milliGRAM(s) Oral daily  heparin  Injectable 5000 Unit(s) SubCutaneous every 12 hours  insulin glargine Injectable (LANTUS) 20 Unit(s) SubCutaneous every morning  insulin glargine Injectable (LANTUS) 68 Unit(s) SubCutaneous at bedtime  insulin lispro (HumaLOG) corrective regimen sliding scale   SubCutaneous three times a day before meals  insulin lispro (HumaLOG) corrective regimen sliding scale   SubCutaneous at bedtime  insulin lispro Injectable (HumaLOG) 24 Unit(s) SubCutaneous three times a day before meals  levothyroxine 50 MICROGram(s) Oral daily  metoprolol succinate ER 12.5 milliGRAM(s) Oral daily  montelukast 10 milliGRAM(s) Oral at bedtime  sodium bicarbonate 650 milliGRAM(s) Oral three times a day  sodium chloride 0.9% lock flush 3 milliLiter(s) IV Push every 8 hours  ticagrelor 90 milliGRAM(s) Oral two times a day    MEDICATIONS  (PRN):  acetaminophen   Tablet 650 milliGRAM(s) Oral every 6 hours PRN For Temp greater than 38 C (100.4 F)  acetaminophen   Tablet. 650 milliGRAM(s) Oral every 6 hours PRN Mild, moderate and severe pain  benzocaine 15 mG/menthol 3.6 mG Lozenge 1 Lozenge Oral every 6 hours PRN Sore Throat  dextrose Gel 1 Dose(s) Oral once PRN Blood Glucose LESS THAN 70 milliGRAM(s)/deciliter  glucagon  Injectable 1 milliGRAM(s) IntraMuscular once PRN Glucose LESS THAN 70 milligrams/deciliter  guaiFENesin    Syrup 100 milliGRAM(s) Oral every 6 hours PRN Cough  sodium chloride 0.65% Nasal 1 Spray(s) Both Nostrils three times a day PRN Nasal Congestion        Vital Signs Last 24 Hrs  T(C): 36.6 (09 Feb 2018 12:45), Max: 36.7 (09 Feb 2018 06:53)  T(F): 97.8 (09 Feb 2018 12:45), Max: 98 (09 Feb 2018 06:53)  HR: 83 (09 Feb 2018 12:45) (77 - 86)  BP: 113/61 (09 Feb 2018 12:45) (113/61 - 126/62)  BP(mean): --  RR: 17 (09 Feb 2018 12:45) (17 - 18)  SpO2: 100% (09 Feb 2018 12:45) (100% - 100%)    CBC Full  -  ( 09 Feb 2018 05:45 )  WBC Count : 9.35 K/uL  Hemoglobin : 9.7 g/dL  Hematocrit : 31.4 %  Platelet Count - Automated : 307 K/uL  Mean Cell Volume : 84.9 fL  Mean Cell Hemoglobin : 26.2 pg  Mean Cell Hemoglobin Concentration : 30.9 %  Auto Neutrophil # : x  Auto Lymphocyte # : x  Auto Monocyte # : x  Auto Eosinophil # : x  Auto Basophil # : x  Auto Neutrophil % : x  Auto Lymphocyte % : x  Auto Monocyte % : x  Auto Eosinophil % : x  Auto Basophil % : x    02-09    142  |  103  |  31<H>  ----------------------------<  134<H>  3.8   |  25  |  1.72<H>    Ca    9.1      09 Feb 2018 05:45  Phos  3.3     02-08  Mg     2.2     02-09            MICROBIOLOGY:  BLOOD VENOUS  01-30-18 --  --  --      RADIOLOGY

## 2018-02-09 NOTE — PROGRESS NOTE ADULT - PROBLEM SELECTOR PROBLEM 3
CHF exacerbation
Diabetes mellitus, type 2
CHF exacerbation
Diabetes mellitus, type 2

## 2018-02-09 NOTE — PROGRESS NOTE ADULT - PROVIDER SPECIALTY LIST ADULT
Cardiology
Endocrinology
Family Medicine
Hospitalist
Infectious Disease
Nephrology
Pulmonology
Infectious Disease
Nephrology
Endocrinology
Endocrinology
Infectious Disease
Endocrinology
Endocrinology
Family Medicine
Endocrinology
Infectious Disease
Pulmonology

## 2018-02-09 NOTE — PROGRESS NOTE ADULT - EXTREMITIES
No cyanosis, clubbing or edema
No cyanosis, clubbing or edema
detailed exam

## 2018-02-09 NOTE — PROGRESS NOTE ADULT - PROBLEM SELECTOR PROBLEM 4
Hypothyroidism
Hyperlipidemia
Hypothyroidism
Hyperlipidemia
Hypothyroidism

## 2018-02-09 NOTE — PROGRESS NOTE ADULT - PROBLEM SELECTOR PROBLEM 6
GERD (gastroesophageal reflux disease)

## 2018-02-09 NOTE — PROGRESS NOTE ADULT - SUBJECTIVE AND OBJECTIVE BOX
Chief complaint  Patient is a 72y old  Female who presents with a chief complaint of SOB (25 Jan 2018 15:14)   Review of systems  Patient in bed, looks comfortable, no fever, no hypoglycemia.    Labs and Fingersticks  CAPILLARY BLOOD GLUCOSE      POCT Blood Glucose.: 163 mg/dL (09 Feb 2018 12:40)  POCT Blood Glucose.: 124 mg/dL (09 Feb 2018 08:57)  POCT Blood Glucose.: 201 mg/dL (08 Feb 2018 21:48)  POCT Blood Glucose.: 185 mg/dL (08 Feb 2018 17:25)          Calcium, Total Serum: 9.1 (02-09 @ 05:45)  Calcium, Total Serum: 8.9 (02-08 @ 08:05)  Calcium, Total Serum: 8.9 (02-08 @ 08:05)          02-09    142  |  103  |  31<H>  ----------------------------<  134<H>  3.8   |  25  |  1.72<H>    Ca    9.1      09 Feb 2018 05:45  Phos  3.3     02-08  Mg     2.2     02-09                          9.7    9.35  )-----------( 307      ( 09 Feb 2018 05:45 )             31.4     Medications  MEDICATIONS  (STANDING):  aspirin enteric coated 81 milliGRAM(s) Oral daily  atorvastatin 40 milliGRAM(s) Oral at bedtime  dextrose 5%. 1000 milliLiter(s) (50 mL/Hr) IV Continuous <Continuous>  dextrose 50% Injectable 12.5 Gram(s) IV Push once  dextrose 50% Injectable 25 Gram(s) IV Push once  dextrose 50% Injectable 25 Gram(s) IV Push once  famotidine    Tablet 20 milliGRAM(s) Oral daily  ferrous    sulfate 325 milliGRAM(s) Oral daily  furosemide    Tablet 40 milliGRAM(s) Oral daily  heparin  Injectable 5000 Unit(s) SubCutaneous every 12 hours  insulin glargine Injectable (LANTUS) 20 Unit(s) SubCutaneous every morning  insulin glargine Injectable (LANTUS) 68 Unit(s) SubCutaneous at bedtime  insulin lispro (HumaLOG) corrective regimen sliding scale   SubCutaneous three times a day before meals  insulin lispro (HumaLOG) corrective regimen sliding scale   SubCutaneous at bedtime  insulin lispro Injectable (HumaLOG) 24 Unit(s) SubCutaneous three times a day before meals  levothyroxine 50 MICROGram(s) Oral daily  metoprolol succinate ER 12.5 milliGRAM(s) Oral daily  montelukast 10 milliGRAM(s) Oral at bedtime  sodium bicarbonate 650 milliGRAM(s) Oral three times a day  sodium chloride 0.9% lock flush 3 milliLiter(s) IV Push every 8 hours  ticagrelor 90 milliGRAM(s) Oral two times a day      Physical Exam  General: Patient comfortable in bed  Vital Signs Last 12 Hrs  T(F): 97.8 (02-09-18 @ 12:45), Max: 98 (02-09-18 @ 06:53)  HR: 83 (02-09-18 @ 12:45) (77 - 83)  BP: 113/61 (02-09-18 @ 12:45) (113/61 - 116/60)  BP(mean): --  RR: 17 (02-09-18 @ 12:45) (17 - 18)  SpO2: 100% (02-09-18 @ 12:45) (100% - 100%)  Neck: No palpable thyroid nodules.  CVS: S1S2, No murmurs  Respiratory: No wheezing, no crepitations  GI: Abdomen soft, bowel sounds positive  Musculoskeletal:  edema lower extremities.   Skin: No skin rashes, no ecchymosis    Diagnostics

## 2018-02-09 NOTE — PROGRESS NOTE ADULT - PROBLEM SELECTOR PLAN 2
monitor labs, Renal FU
-supportive care  -repeat RVP  -if flu+, start tamiflu
monitor labs, Renal FU
The ct scan showed no pneumonia, chf has resolved too
The ct scan showed no pneumonia, chf has resolved too  2/2: no pneumonia on ct chest: resolved pulmonary edema
The ct scan showed no pneumonia, chf has resolved too  2/2: no pneumonia on ct chest: resolved pulmonary edema  2/3 monitor I&O. diuretics
The ct scan showed no pneumonia, chf has resolved too  2/2: no pneumonia on ct chest: resolved pulmonary edema  2/3 monitor I&O. diuretics  2/4 stable. continue to monitor I&O
The ct scan showed no pneumonia, chf has resolved too  2/2: no pneumonia on ct chest: resolved pulmonary edema  2/3 monitor I&O. diuretics  2/4 stable. continue to monitor I&O  2/5: cont diuretics: Her breathing has improved.
The ct scan showed no pneumonia, chf has resolved too  2/2: no pneumonia on ct chest: resolved pulmonary edema  2/3 monitor I&O. diuretics  2/7: cath noted: defer to cardiology   2/4 stable. continue to monitor I&O  2/5: cont diuretics: Her breathing has improved.  2/6: for cath today
The ct scan showed no pneumonia, chf has resolved too  2/2: no pneumonia on ct chest: resolved pulmonary edema  2/3 monitor I&O. diuretics  2/7: cath noted: defer to cardiology   2/4 stable. continue to monitor I&O  2/5: cont diuretics: Her breathing has improved.  2/6: for cath today  2/8:cath noted: cont current treatment
The ct scan showed no pneumonia, chf has resolved too  2/2: no pneumonia on ct chest: resolved pulmonary edema  2/3 monitor I&O. diuretics  2/7: cath noted: defer to cardiology   2/4 stable. continue to monitor I&O  2/5: cont diuretics: Her breathing has improved.  2/6: for cath today  2/8:cath noted: cont current treatment  2/9: seems to be doing bett er
monitor labs, Renal FU
-resolved  -nonlocalizing for infection  -rt groin looks ok  -monitor off abx  -suspect reactive to cardiac cath
monitor labs, Renal FU
The ct scan showed no pneumonia, chf has resolved too  2/2: no pneumonia on ct chest: resolved pulmonary edema  2/3 monitor I&O. diuretics  2/4 stable. continue to monitor I&O  2/5: cont diuretics: Her breathing has improved.  2/6: for cath today
-nonlocalising for infection  -rt groin looks ok  -monitor off abx  -suspect reactive to cardiac cath
-nonlocalising for infection  -rt groin looks ok  -monitor off abx  -suspect reactive to cardiac cath
-supportive care  -droplet precautions

## 2018-02-09 NOTE — PROGRESS NOTE ADULT - PROBLEM SELECTOR PROBLEM 2
URI (upper respiratory infection)
Stage 3 chronic kidney disease
CHF exacerbation
Stage 3 chronic kidney disease
Leukocytosis
Stage 3 chronic kidney disease
Stage 3 chronic kidney disease
CHF exacerbation
Leukocytosis
Leukocytosis
URI (upper respiratory infection)

## 2018-02-09 NOTE — PROGRESS NOTE ADULT - PROBLEM SELECTOR PROBLEM 1
Diabetes mellitus, type 2
Fever
Diabetes mellitus, type 2
Fever
Diabetes mellitus, type 2
Fever

## 2018-02-09 NOTE — PROGRESS NOTE ADULT - PROBLEM SELECTOR PLAN 6
on PPI  2/3 PPI  2/4 PPI
on PPI
on PPI
on PPI  2/3 PPI
on PPI  2/3 PPI  2/4 PPI
on PPI  2/3 PPI  2/4 PPI
on PPI  2/3 PPI  2/4 PPI  2/7: on famotidine

## 2018-02-09 NOTE — PROGRESS NOTE ADULT - SUBJECTIVE AND OBJECTIVE BOX
Patient is a 72y old  Female who presents with a chief complaint of SOB (25 Jan 2018 15:14)      Any change in ROS: pt has been doing good:  no SOB at rest: ZEE +     MEDICATIONS  (STANDING):  aspirin enteric coated 81 milliGRAM(s) Oral daily  atorvastatin 40 milliGRAM(s) Oral at bedtime  dextrose 5%. 1000 milliLiter(s) (50 mL/Hr) IV Continuous <Continuous>  dextrose 50% Injectable 12.5 Gram(s) IV Push once  dextrose 50% Injectable 25 Gram(s) IV Push once  dextrose 50% Injectable 25 Gram(s) IV Push once  famotidine    Tablet 20 milliGRAM(s) Oral daily  ferrous    sulfate 325 milliGRAM(s) Oral daily  furosemide    Tablet 40 milliGRAM(s) Oral daily  heparin  Injectable 5000 Unit(s) SubCutaneous every 12 hours  insulin glargine Injectable (LANTUS) 20 Unit(s) SubCutaneous every morning  insulin glargine Injectable (LANTUS) 68 Unit(s) SubCutaneous at bedtime  insulin lispro (HumaLOG) corrective regimen sliding scale   SubCutaneous three times a day before meals  insulin lispro (HumaLOG) corrective regimen sliding scale   SubCutaneous at bedtime  insulin lispro Injectable (HumaLOG) 24 Unit(s) SubCutaneous three times a day before meals  levothyroxine 50 MICROGram(s) Oral daily  metoprolol succinate ER 12.5 milliGRAM(s) Oral daily  montelukast 10 milliGRAM(s) Oral at bedtime  potassium chloride    Tablet ER 10 milliEquivalent(s) Oral once  sodium bicarbonate 650 milliGRAM(s) Oral three times a day  sodium chloride 0.9% lock flush 3 milliLiter(s) IV Push every 8 hours  ticagrelor 90 milliGRAM(s) Oral two times a day    MEDICATIONS  (PRN):  acetaminophen   Tablet 650 milliGRAM(s) Oral every 6 hours PRN For Temp greater than 38 C (100.4 F)  acetaminophen   Tablet. 650 milliGRAM(s) Oral every 6 hours PRN Mild, moderate and severe pain  benzocaine 15 mG/menthol 3.6 mG Lozenge 1 Lozenge Oral every 6 hours PRN Sore Throat  dextrose Gel 1 Dose(s) Oral once PRN Blood Glucose LESS THAN 70 milliGRAM(s)/deciliter  glucagon  Injectable 1 milliGRAM(s) IntraMuscular once PRN Glucose LESS THAN 70 milligrams/deciliter  guaiFENesin    Syrup 100 milliGRAM(s) Oral every 6 hours PRN Cough  sodium chloride 0.65% Nasal 1 Spray(s) Both Nostrils three times a day PRN Nasal Congestion    Vital Signs Last 24 Hrs  T(C): 36.6 (09 Feb 2018 12:45), Max: 36.7 (09 Feb 2018 06:53)  T(F): 97.8 (09 Feb 2018 12:45), Max: 98 (09 Feb 2018 06:53)  HR: 83 (09 Feb 2018 12:45) (77 - 86)  BP: 113/61 (09 Feb 2018 12:45) (113/61 - 126/62)  BP(mean): --  RR: 17 (09 Feb 2018 12:45) (17 - 18)  SpO2: 100% (09 Feb 2018 12:45) (100% - 100%)    I&O's Summary    08 Feb 2018 07:01  -  09 Feb 2018 07:00  --------------------------------------------------------  IN: 1128 mL / OUT: 0 mL / NET: 1128 mL          Physical Exam:   GENERAL: NAD, well-groomed, well-developed  HEENT: MOHSEN/   Atraumatic, Normocephalic  ENMT: No tonsillar erythema, exudates, or enlargement; Moist mucous membranes, Good dentition, No lesions  NECK: Supple, No JVD, Normal thyroid  CHEST/LUNG: no wheezing  CVS: Regular rate and rhythm; No murmurs, rubs, or gallops  GI: : Soft, Nontender, Nondistended; Bowel sounds present  NERVOUS SYSTEM:  Alert & Oriented X3  EXTREMITIES:  2+ Peripheral Pulses, No clubbing, cyanosis, or edema  LYMPH: No lymphadenopathy noted  SKIN: No rashes or lesions  ENDOCRINOLOGY: No Thyromegaly  PSYCH: Appropriate    Labs:                              9.7    9.35  )-----------( 307      ( 09 Feb 2018 05:45 )             31.4                         11.1   11.40 )-----------( 347      ( 08 Feb 2018 08:05 )             35.3                         11.3   11.14 )-----------( 361      ( 07 Feb 2018 07:05 )             35.6                         9.9    8.79  )-----------( 312      ( 06 Feb 2018 07:25 )             31.1     02-09    142  |  103  |  31<H>  ----------------------------<  134<H>  3.8   |  25  |  1.72<H>  02-08    140  |  99  |  32<H>  ----------------------------<  86  3.7   |  24  |  1.90<H>  02-07    142  |  101  |  31<H>  ----------------------------<  123<H>  3.8   |  24  |  1.81<H>  02-06    142  |  103  |  33<H>  ----------------------------<  168<H>  4.4   |  22  |  1.66<H>    Ca    9.1      09 Feb 2018 05:45  Ca    8.9      08 Feb 2018 08:05  Phos  3.3     02-08  Mg     2.2     02-09  Mg     2.1     02-08      CAPILLARY BLOOD GLUCOSE      POCT Blood Glucose.: 163 mg/dL (09 Feb 2018 12:40)  POCT Blood Glucose.: 124 mg/dL (09 Feb 2018 08:57)  POCT Blood Glucose.: 201 mg/dL (08 Feb 2018 21:48)  POCT Blood Glucose.: 185 mg/dL (08 Feb 2018 17:25)            Cultures:       < from: Xray Chest 1 View- PORTABLE-Routine (02.01.18 @ 08:05) >  CLINICAL INFORMATION: Cough.    COMPARISON: [facility] chest x-ray from January 30, 2018. CT scan of the chest   from January 31, 2018.    TECHNIQUE:   [facility] Portable chest x-ray.    INTERPRETATION:     Heart size and the mediastinum cannot be accurately evaluated on this   projection. Coronary artery calcification and/or stent is seen.  Median sternotomy sutures and surgical clips are again seen.  There is minimal linear atelectasis versus scar in the left lower lung.   The lungs are otherwise clear.  No pleural effusion is noted.  The visualized bony structures appear intact.            IMPRESSION:  Minimal linear atelectasis versus scar in the left lower   lung. Otherwise clear lungs.   Please see report of the CT scan of the chest for any further detail.        < from: CT Chest No Cont (01.31.18 @ 17:59) >  PROCEDURE DATE:  Jan 31 2018         INTERPRETATION:  CLINICAL INFORMATION: Fever, shortness of breath   evaluate for pneumonia..    TECHNIQUE: CT scan of the chest was obtained without intravenous   contrast. Coronal and Sagittal reconstructions were performed.  Maximum   Intensity Projection was generated.    COMPARISON: CT chest from January 20, 2018.    FINDINGS:    AIRWAYS, LUNGS AND PLEURA: Patent central airways. Previously noted   groundglass opacities within bothlungs are no longer present. Previously   noted bilateral pleural effusions have resolved.  There is no pneumothorax.  VESSELS: Thoracic aorta is normal in caliber. The aorta and coronary   arteries are calcified.  HEART: Cardiomegaly. There is no pericardial effusion.   MEDIASTINUM: No significant mediastinal or hilar adenopathy is seen.   NECK AND CHEST WALL: The visualized thyroid is homogeneous. There is no   significant supraclavicular or axillary lymphadenopathy.  UPPER ABDOMEN: The visualized upper abdomen is unremarkable.  BONES: Status post median sternotomy.    IMPRESSION:  Resolution of the previously noted bilateral pleural effusions and   groundglass opacities within both lungs when compared to previous exam.              KENDALL GRANT M.D., RADIOLOGY RESIDENT  This document has been electronically signed.  IRVING MERCEDES M.D., ATTENDING RADIOLOGIST  This document has been electronically signed. Feb 1 2018 12:23PM        < end of copied text >            CATRACHITA KEENAN M.D., ATTENDING RADIOLOGIST  This document has been electronically signed. Feb 1 2018 12:00PM    < end of copied text >                        Studies  Chest X-RAY  CT SCAN Chest   Venous Dopplers: LE:   CT Abdomen  Others

## 2018-02-09 NOTE — PROGRESS NOTE ADULT - PROBLEM SELECTOR PLAN 5
Cont home meds  2/3 home med  2/4 Synthroid
Cont home meds
Cont home meds
Cont home meds  2/3 home med
Cont home meds  2/3 home med  2/4 Synthroid
Cont home meds  2/3 home med  2/4 Synthroid
Cont home meds  2/3 home med  2/4 Synthroid  2/7; cont current meds
Cont home meds  2/3 home med  2/4 Synthroid  2/7; cont current meds  2/8: on synthroid
Cont home meds  2/3 home med  2/4 Synthroid  2/7; cont current meds  2/8: on synthroid

## 2018-02-09 NOTE — PROGRESS NOTE ADULT - RS GEN PE MLT RESP DETAILS PC
clear to auscultation bilaterally/respirations non-labored/good air movement/breath sounds equal
clear to auscultation bilaterally/breath sounds equal/good air movement/respirations non-labored
good air movement/respirations non-labored/clear to auscultation bilaterally/breath sounds equal
clear to auscultation bilaterally/breath sounds equal/good air movement/respirations non-labored
respirations non-labored/clear to auscultation bilaterally/breath sounds equal/good air movement

## 2018-02-09 NOTE — PROGRESS NOTE ADULT - ASSESSMENT
1.	CHF decompensation, EF 32%  2.	MERVIN: renal function fluctuating likely sec. to CHF. renal function improved   3.	CKD 3, baseline 1.5-1.8  4.	CAD. cardio following  5.	Acidosis improved   6.	Hypokalemia  7.	fever +flu on Tamiflu   8.	anemia: iron sat 10%    PLAN:   1.	Renal function improved to baseline, clear for d/c from renal stand point  2.	follow up with dr. gracia in clinic in 2 weeks  3.	f/u cardio

## 2018-02-09 NOTE — PROGRESS NOTE ADULT - PROBLEM SELECTOR PLAN 7
DVT prophylaxis  2/3 Heparin SQ  2/4 Heparin SQ
DVT prophylaxis
DVT prophylaxis
DVT prophylaxis  2/3 Heparin SQ
DVT prophylaxis  2/3 Heparin SQ  2/4 Heparin SQ

## 2018-02-09 NOTE — PROGRESS NOTE ADULT - PROBLEM SELECTOR PLAN 1
-likely URI  -repeat RVP  -hold off abx
Will continue current insulin regimen for now. Will continue monitoring FS, log, will Follow up.  Patient counseled for compliance with consistent low carb diet.
Will continue current insulin regimen for now. Will continue monitoring FS, log, will Follow up.  Patient counseled for compliance with consistent low carb diet.
Will continue current insulin regimen for now. Will continue monitoring FS, log, will Follow up.  DC home on current insulin regimen, FU 2 weeks  Patient counseled for compliance with consistent low carb diet.
Will continue current insulin regimen for now. Will continue monitoring FS, log, will Follow up.  Patient counseled for compliance with consistent low carb diet.
Will continue current insulin regimen for now. Will continue monitoring FS, log, will Follow up.  Patient counseled for compliance with consistent low carb diet.
Will increase Lantus to 60 units at bed time.  Will increase Humalog to 20 units before each meal in addition to Humalog correction scale coverage.  Patient counseled for compliance with consistent low carb diet.
Will add Lantus 20u am, Will continue current insulin regimen for now. Will continue monitoring FS, log, will Follow up.  Patient counseled for compliance with consistent low carb diet.
Will add Lantus 20u am, Will continue current insulin regimen for now. Will continue monitoring FS, log, will Follow up.  Patient counseled for compliance with consistent low carb diet.
Will continue current insulin regimen for now. Will continue monitoring FS, log, will Follow up.  DC home on current insulin regimen, FU 2 weeks  Patient counseled for compliance with consistent low carb diet.
Will continue current insulin regimen for now. Will continue monitoring FS, log, will Follow up.  DC home on current insulin regimen, FU 2 weeks  Patient counseled for compliance with consistent low carb diet.
Will continue current insulin regimen for now. Will continue monitoring FS, log, will Follow up.  Patient counseled for compliance with consistent low carb diet.
Will increase Lantus to 56 units at bed time.  Will increase Humalog to 18 units before each meal in addition to Humalog correction scale coverage.  Patient counseled for compliance with consistent low carb diet.
Will increase Lantus to 68 units at bed time.  Will increase Humalog to 24 units before each meal in addition to Humalog correction scale coverage.  Patient counseled for compliance with consistent low carb diet.
Will increase pre meal insulin to 16u before each meal, continue Lantus, FU  Patient counseled for compliance with consistent low carb diet.
seconadry to flu: doing ok
secondary to flu: doing ok  2/2: Afebrile
secondary to flu: doing ok  2/2: Afebrile  2/3 afebrile for 24 hours. On Tamiflu
secondary to flu: doing ok  2/2: Afebrile  2/3 afebrile for 24 hours. On Tamiflu  2/4 afebrile
secondary to flu: doing ok  2/2: Afebrile  2/3 afebrile for 24 hours. On Tamiflu  2/4 afebrile  2/5: Resolved
secondary to flu: doing ok  2/2: Afebrile  2/3 afebrile for 24 hours. On Tamiflu  2/4 afebrile  2/5: Resolved  2/6: doing ok  2/7: resolved
secondary to flu: doing ok  2/2: Afebrile  2/3 afebrile for 24 hours. On Tamiflu  2/4 afebrile  2/5: Resolved  2/6: doing ok  2/7: resolved
secondary to flu: doing ok  2/2: Afebrile  2/3 afebrile for 24 hours. On Tamiflu  2/4 afebrile  2/5: Resolved  2/6: doing ok  2/7: resolved  2/9: resolved
-Flu+  -s/p tamiflu x  5 days  -afebrile
Will continue current insulin regimen for now. Will continue monitoring FS, log, will Follow up.  Patient counseled for compliance with consistent low carb diet.
Will continue current insulin regimen for now. Will continue monitoring FS, log, will Follow up.  Patient counseled for compliance with consistent low carb diet.
secondary to flu: doing ok  2/2: Afebrile  2/3 afebrile for 24 hours. On Tamiflu  2/4 afebrile  2/5: Resolved  2/6: doing ok
-Flu+  -s/p tamiflu x  5 days  -afebrile
-Flu+  -s/p tamiflu x  5 days  -afebrile
-likely URI  -Flu+  -tamiflu x  5 days

## 2018-02-09 NOTE — PROGRESS NOTE ADULT - PROBLEM SELECTOR PLAN 3
monitor blood glucose  2/3 FS with sliding scale. less than equal to 252  2/4 stable  2/6: controlled
On medications, monitored and followed up by primary/cardiology team
monitor blood glucose
monitor blood glucose
monitor blood glucose  2/3 FS with sliding scale. less than equal to 252
monitor blood glucose  2/3 FS with sliding scale. less than equal to 252  2/4 stable
monitor blood glucose  2/3 FS with sliding scale. less than equal to 252  2/4 stable
monitor blood glucose  2/3 FS with sliding scale. less than equal to 252  2/4 stable  2/6: controlled  2/7: blood glucose controlled
monitor blood glucose  2/3 FS with sliding scale. less than equal to 252  2/4 stable  2/6: controlled  2/7: blood glucose controlled  2/8: blood glucose is controlled
monitor blood glucose  2/3 FS with sliding scale. less than equal to 252  2/4 stable  2/6: controlled  2/7: blood glucose controlled  2/8: blood glucose is controlled
On medications, monitored and followed up by primary/cardiology team

## 2018-02-09 NOTE — PROGRESS NOTE ADULT - ASSESSMENT
71 Female, with a PmHx of HTN, CKD, Hyperlipidemia, DM-II, CAD s/p CABG x 3, s/p stents, presenting to the [facility] ED c/o shortness of breath with HF with fever and URI symptoms

## 2018-02-09 NOTE — PROGRESS NOTE ADULT - SUBJECTIVE AND OBJECTIVE BOX
Subjective:  denies chest pain feeling much better       MEDICATIONS  (STANDING):  aspirin enteric coated 81 milliGRAM(s) Oral daily  atorvastatin 40 milliGRAM(s) Oral at bedtime  dextrose 5%. 1000 milliLiter(s) (50 mL/Hr) IV Continuous <Continuous>  dextrose 50% Injectable 12.5 Gram(s) IV Push once  dextrose 50% Injectable 25 Gram(s) IV Push once  dextrose 50% Injectable 25 Gram(s) IV Push once  famotidine    Tablet 20 milliGRAM(s) Oral daily  ferrous    sulfate 325 milliGRAM(s) Oral daily  furosemide    Tablet 40 milliGRAM(s) Oral daily  heparin  Injectable 5000 Unit(s) SubCutaneous every 12 hours  insulin glargine Injectable (LANTUS) 20 Unit(s) SubCutaneous every morning  insulin glargine Injectable (LANTUS) 68 Unit(s) SubCutaneous at bedtime  insulin lispro (HumaLOG) corrective regimen sliding scale   SubCutaneous three times a day before meals  insulin lispro (HumaLOG) corrective regimen sliding scale   SubCutaneous at bedtime  insulin lispro Injectable (HumaLOG) 24 Unit(s) SubCutaneous three times a day before meals  levothyroxine 50 MICROGram(s) Oral daily  metoprolol succinate ER 12.5 milliGRAM(s) Oral daily  montelukast 10 milliGRAM(s) Oral at bedtime  sodium bicarbonate 650 milliGRAM(s) Oral three times a day  sodium chloride 0.9% lock flush 3 milliLiter(s) IV Push every 8 hours  ticagrelor 90 milliGRAM(s) Oral two times a day    MEDICATIONS  (PRN):  acetaminophen   Tablet 650 milliGRAM(s) Oral every 6 hours PRN For Temp greater than 38 C (100.4 F)  acetaminophen   Tablet. 650 milliGRAM(s) Oral every 6 hours PRN Mild, moderate and severe pain  benzocaine 15 mG/menthol 3.6 mG Lozenge 1 Lozenge Oral every 6 hours PRN Sore Throat  dextrose Gel 1 Dose(s) Oral once PRN Blood Glucose LESS THAN 70 milliGRAM(s)/deciliter  glucagon  Injectable 1 milliGRAM(s) IntraMuscular once PRN Glucose LESS THAN 70 milligrams/deciliter  guaiFENesin    Syrup 100 milliGRAM(s) Oral every 6 hours PRN Cough  sodium chloride 0.65% Nasal 1 Spray(s) Both Nostrils three times a day PRN Nasal Congestion      LABS:                        9.7    9.35  )-----------( 307      ( 09 Feb 2018 05:45 )             31.4     Hemoglobin: 9.7 g/dL (02-09 @ 05:45)  Hemoglobin: 11.1 g/dL (02-08 @ 08:05)  Hemoglobin: 11.1 g/dL (02-08 @ 08:05)  Hemoglobin: 11.3 g/dL (02-07 @ 07:05)  Hemoglobin: 9.9 g/dL (02-06 @ 07:25)    02-09    142  |  103  |  31<H>  ----------------------------<  134<H>  3.8   |  25  |  1.72<H>    Ca    9.1      09 Feb 2018 05:45  Phos  3.3     02-08  Mg     2.2     02-09      Creatinine Trend: 1.72<--, 1.90<--, 1.81<--, 1.66<--, 1.60<--, 1.64<--           PHYSICAL EXAM  Vital Signs Last 24 Hrs  T(C): 36.6 (09 Feb 2018 12:45), Max: 36.7 (09 Feb 2018 06:53)  T(F): 97.8 (09 Feb 2018 12:45), Max: 98 (09 Feb 2018 06:53)  HR: 83 (09 Feb 2018 12:45) (77 - 86)  BP: 113/61 (09 Feb 2018 12:45) (113/61 - 126/62)  BP(mean): --  RR: 17 (09 Feb 2018 12:45) (17 - 18)  SpO2: 100% (09 Feb 2018 12:45) (100% - 100%)      Cardiovascular: S1S2 RRR  Respiratory: CTA BL   Gastrointestinal:  Soft, Non-tender, + BS	  Extremities No edema, cyanosis or clubbing  B/L LE's     DIAGNOSTIC DATA:     TELEMETRY: SR 	       < from: Cardiac Cath Lab - Adult (10.13.17 @ 10:40) >  VENTRICLES: No left ventriculogram was performed.  CORONARY VESSELS: The coronary circulation is right dominant.  LM:   --  LM: Normal.  LAD:   --  Proximal LAD: There was a diffuse 60 % stenosis.  --  Mid LAD: There was a 100 % stenosis with the distal vessel being filled  via LIMA-LAD.  CX:   --  Circumflex: The vessel was small sized. Angiography showed mild  atherosclerosis withno flow limiting lesions.  --  OM1: Angiography showed mild atherosclerosis with no flow limiting  lesions.  RI:   --  Ostial ramus intermedius: There was a tubular 80 % stenosis.  There was PARUL grade 3 flow through the vessel (brisk flow).  RCA:   --  Proximal RCA: Angiography showed mild atherosclerosis with no  flow limiting lesions.  --  Mid RCA: There was a 100 % stenosis with the distal vessel being filled  via collaterals. This lesion is a chronic total occlusion.  GRAFTS:   --  Graft to the LAD: The graft was a LIMA. Graft angiography  showed minor luminal irregularities. Distal vessel angiography showed  minor luminal irregularities.  AORTA: Ascending aorta: Normal. No additional grafts visualized in  aortogram.  COMPLICATIONS: There were no complications.  DIAGNOSTIC RECOMMENDATIONS: Coronary angiogram demonstrates patent  LIMA-LAD, closed SVG grafts, and a severe stenosis in the ostial Ramus.  Will perform staged PCI to RI when Cr stable and renally optimized.  Prepared and signed by  Corie Ga M.D.  Signed 10/17/2017 13:49:15    < end of copied text >    < from: Cardiac Cath Lab - Adult (11.17.17 @ 11:48) >  PROCEDURE:  --  Intervention on ramus intermedius: drug-eluting stent.    VENTRICLES: No LV gram was performed; however, a recent echocardiogram  demonstrated normal global and regional LV function.  CORONARY VESSELS: The coronary circulation is right dominant.  LM:   --  LM: Angiography showed minor luminal irregularities with no flow  limiting lesions.  LAD:   --  Ostial LAD: There was a 100 % stenosis.  CX:   --  Circumflex: This vessel was not injected, but was visualized  during a prior cardiac catheterization.  RI:   --  Ramus intermedius: There was a 95 % stenosis.  RCA:   --  RCA: This vessel was not injected.  COMPLICATIONS: There were no complications.  DIAGNOSTIC RECOMMENDATIONS: The patient should continue with the present  medications. will add plavix 75mg po once a day/ will add ECASA 325mg po  once day.  Prepared and signed by  Roberta Quick M.D.  Signed 11/17/2017 13:16:01    < end of copied text >    < from: TTE with Doppler (w/Cont) (11.25.17 @ 12:33) >  CONCLUSIONS:  1. Mitral annular calcification, otherwise normal mitral  valve. Mild mitral regurgitation.  2. Normal left ventricular internal dimensions and wall  thicknesses.  3. Endocardium not well visualized; grossly moderate to  severe segmental left ventricularsystolic dysfunction.  Hypokinesis of the inferior, lateral, and inferolateral  walls.  Endocardial visualization enhanced with intravenous  injection of echo contrast (Definity).  No LV thrombus  seen.  4. The right ventricle is not well visualized;grossly  normal right ventricular systolic function.  ------------------------------------------------------------------------  Confirmed on  11/25/2017 - 14:44:41 by Jose Lucia M.D.    < end of copied text >    < from: Cardiac Cath Lab - Adult (12.05.17 @ 12:48) >  VENTRICLES: No LV gram was performed; however, a recent echocardiogram  demonstrated an EF of 36 %.  CORONARY VESSELS: The coronary circulation is right dominant.  LM:   --  Distal left main: Angiography showed minor luminal irregularities  with no flow limiting lesions.  LAD:   --  Mid LAD: There was a 100 % stenosis with the distal vessel being  filled via LIMA-LAD.  CX:   --  Proximal circumflex: There was a tubular 20 % stenosis.  --  Mid circumflex: Angiography showed minor luminal irregularities with no  flow limiting lesions.  --  Distal circumflex: Angiography showed minor luminal irregularities with  no flow limiting lesions.  --  OM1: The vessel was small sized. There was a discrete 60 % stenosis.  RI:   --  Ostial ramus intermedius: Angiography showed minor luminal  irregularities with no flow limiting lesions. There was no significant  restenosis in RI stent.  --  Proximal ramus intermedius: Angiography showed minor luminal  irregularities with no flow limiting lesions.  --  Mid ramus intermedius: There was a tubular 80 % stenosis. The lesion  was associated with a small filling defect consistent with thrombus.  RCA:   --  Mid RCA: There was a 100 % stenosis with the distal vessel being  filled via collaterals from the LAD.  GRAFTS:   --  Graft to the LAD: The graft was a LIMA. Graft angiography  showed minor luminal irregularities. Distal vessel angiography showed  minor luminalirregularities.  COMPLICATIONS: There were no complications.  DIAGNOSTIC RECOMMENDATIONS: Coronary angiogram demonstrates a severe  stenosis in the ramus intermedius that is the cause of the patient's  clinical presentation. Will therefore perform PCI to the RI.  INTERVENTIONAL RECOMMENDATIONS: S/p successful CORNELIA to the Ramus  Intermedius. The patient should continue with ASA and Brilinta for at  least 12 months.  Prepared and signed by  Corie Ga M.D.  Signed 12/06/2017 17:06:30    < from: CT Chest No Cont (01.20.18 @ 09:55) >  IMPRESSION:   Mild pulmonary edema with small simple bilateral pleural effusions, right   greater than left, with no evidence of loculation.    < end of copied text >    < from: TTE with Doppler (w/Cont) (01.23.18 @ 12:31) >  CONCLUSIONS:  1. Mitral annular calcification, otherwise normal mitral  valve. Mild-moderate mitral regurgitation.  2. Calcified trileaflet aortic valve with normal opening.  Mild aortic regurgitation.  3. Normal left ventricular internal dimensions and wall  thicknesses.  4. Endocardium not well visualized; grossly severe global  left ventricular systolic dysfunction.  Endocardial  visualization enhanced with intravenous injection of echo  contrast (Definity).  5. The right ventricle is not well visualized; grossly  normal right ventricular systolic function.  *** Compared with echocardiogram of 11/25/2017, no  significant changes noted.  ------------------------------------------------------------------------  Confirmed on  1/23/2018 - 14:35:49 by Jose Lucia M.D.    < end of copied text >    < from: Nuclear Stress Test-Pharmacologic (01.28.18 @ 12:31) >  IMPRESSIONS:Abnormal Study  * Myocardial Perfusion SPECT results are abnormal.  * There is a medium sized, mild to moderate defect in  anterior wall that is reversible, suggestive of  ischemia.There are large, moderate to severe defects in  inferolateral, lateral walls that are fixed, suggestive of  infarct.  * Post-stress gated wall motion analysis was performed  (LVEF = 33 %;LVEDV = 116 ml.), revealing severe overall  hypokinesis. There was focal inferiolateral akinesis and  severe lateral hypokinesis with absence of systolic  thickening. The best motion was in the septum.  *** Compared with Nuclear/Stress test of 11/5/2014, the  observed defect are now more extensive, suggesting  progression of disease. Prior LVEF was 39% with EDV 77 mL.  ------------------------------------------------------------------------  Revised on  1/29/2018 - 16:44:19 by Lorenzo Covarrubias M.D.  Confirmed on  1/30/2018 - 15:45:44 by José Hansen M.D.  < end of copied text >    < from: CT Chest No Cont (01.31.18 @ 17:59) >  IMPRESSION:  Resolution of the previously noted bilateral pleural effusions and   groundglass opacities within both lungs when compared to previous exam.    < end of copied text >      ASSESSMENT/PLAN: 	72y Female w/ PMH HTN, CKD, Hyperlipidemia, DM-II, CAD s/p 3V CABG  (LIMA to LAD, SVG to OM, SVG to PDA) at [facility] 2014, s/p CORNELIA TO RI 11/17/17, NSTEMI 12/2017 s/p LHC on 12/5 and CORNELIA to D [facility], TTE at that time revealed grossly moderate to severe LV dysfunction with hypokinesis of the inferior, lateral and inferolateral with an EF of 36% admitted with acute on chronic systolic CHF, s/p IV diureses, s/p NST as above, now s/p Flu and course of Tamiflu     --Not in decomp CHF by exam  --Pulm F/U noted  --suspect SOB, in part, can be due to deconditioning  --Given ongoing chest pain and NST as noted above, s/p LHC on 2/6  with no new OBS CAD  --Cont current meds  --Renal input appreciated   -DC planning   _F/U DR Quick post discharge

## 2018-06-23 NOTE — H&P CARDIOLOGY - CARDIOVASCULAR SYMPTOMS
25 year old female presents to ED with lower left facial swelling as of yesterday. Patient reports swelling coming on suddenly and aggressively. Reports having crown on tooth that broke about one year ago.     Extraoral examination:  Lower left facial swelling approaching but not including anterior region. Warm and tender to palpation.     Intraoral examination:  Vestibular swelling coincident with dental abscess. Remaining root tip #19. Tenderness to palpation along buccal aspect of lower left quadrant and on occlusal aspects of adjacent dentition. No tenderness on lingual aspect of quadrant. No sublingual swelling. chest pain

## 2018-08-03 NOTE — H&P ADULT - PROBLEM SELECTOR PLAN 5
8/3/2018       RE: Tee Larkin  [address]  [address]  [facility] [place] [address]     Dear Colleague,    Thank you for referring your patient, Tee Larkin, to the [facility] DERMATOLOGY at [facility]. Please see a copy of my visit note below.    [facility] Dermatology Note      Dermatology Problem List:    1. Pityrosporum Folliculitis with rosacea component, involvement of scalp and face.                        - Pityrosporum biopsy proven many years ago.  Bactrim DS 1 pill every day but will try to decrease to every other day.                         - Last CBC and CMP, 04/18/2017                        - Ketoconazole 2% shampoo.   2.  Seborrheic dermatitis                        - 2% ketoconazole shampoo.    - Cryotherapy to lesion on left ere on 8/3/18    CC:   Derm Problem (Tee is visiting discuss area on ear. )    Encounter Date: Aug 3, 2018    History of Present Illness:  Mr. Tee Larkin is a 79 year old male who presents as a referral from Self.  Area on left ear that came up about a week ago. Had used a washcloth to dab it and it started bleeding. Denies trauma to the area. No pain preceding it. Noticed it only after bleeding and saw the area. Never had anything similar. No other areas that are new. Has a skin check later this month but because concerned about the fast growing area wanted to see someone more urgently.     Past Medical History:   Patient Active Problem List   Diagnosis     Other specified disease of hair and hair follicles     History reviewed. No pertinent past medical history.    Allergy History:     Allergies   Allergen Reactions     Penicillins Hives     Chocolate Rash     Nuts Rash     Social History:  The patient is retired. Patient has the following hobbies or non-occupational exposure: none    Lots of sunburns including some that blistered as a kid  Now wears sunscreen     reports that he has quit smoking. His smoking  use included Cigarettes. He has never used smokeless tobacco.    Family History:  Family History   Problem Relation Age of Onset     Cancer No family hx of      no skin cancer     Skin Cancer No family hx of      Melanoma No family hx of    Grandmother with some sort of skin cancer    Medications:  Current Outpatient Prescriptions   Medication Sig Dispense Refill     aspirin 81 MG tablet Take 81 mg by mouth daily.       clobetasol (TEMOVATE) 0.05 % external solution Apply several drops over scalp to spread think layer-15 minutes before shower Twice weekly 59 mL 6     ketoconazole (NIZORAL) 2 % shampoo Use as shampoo daily 120 mL 11     LOSARTAN POTASSIUM PO Take  by mouth.       metoprolol (LOPRESSOR) 25 MG tablet Take 25 mg by mouth 2 times daily.       nitroGLYCERIN (NITROSTAT) 0.4 MG SL tablet Place 0.4 mg under the tongue every 5 minutes as needed.       Omeprazole 20 MG tablet Take 20 mg by mouth daily.       rosuvastatin (CRESTOR) 40 MG tablet Take 40 mg by mouth daily.       Shampoos (FREE & CLEAR) SHAM Externally apply  topically. As directed       sulfamethoxazole-trimethoprim (BACTRIM DS/SEPTRA DS) 800-160 MG per tablet Take 1 tablet by mouth daily 90 tablet 1     Review of Systems:  -As per HPI  -Constitutional: The patient denies fatigue, fevers, chills, unintended weight loss, and night sweats.  -HEENT: Patient denies nonhealing oral sores.  -Skin: As above in HPI. No additional skin concerns.    Physical exam:  Vitals: There were no vitals taken for this visit.  GEN: This is a well developed, well-nourished male in no acute distress, in a pleasant mood.    SKIN: Focused examination of the Ear and face was performed.  - stuck on tan papule on ear with scale overlaying   - No other lesions of concern on areas examined.     Impression/Plan:    1) Inflamed seborrheic keratosis  Noticed about a week ago with bleeding and enlargement. No pain or other symptoms but given inflamed, electing to treat with  cryotherapy today  Cryotherapy procedure note: After verbal consent and discussion of risks and benefits including but no limited to dyspigmentation/scar, blister, and pain, 1 was(were) treated with 1-2mm freeze border for 2 cycles with liquid nitrogen. Post cryotherapy instructions were provided.     Follow-up with Dr Xiong as scheduled - focused visit today for this problematic lesion    Pt seen and discussed with Dr. Tabitha Benton MD  IM resident, PGY2    Staff Physician Comments:   I saw and evaluated the patient with the resident and I agree with the assessment and plan.  I was present for the entire minor procedure and examination (performed it personally).    Mark Lu MD  Dermatology Staff Physician  , Department of Dermatology   Continue levothyroxine  Check TSH

## 2018-09-01 ENCOUNTER — INPATIENT (INPATIENT)
Facility: HOSPITAL | Age: 73
LOS: 8 days | Discharge: HOME HEALTH | End: 2018-09-10
Attending: INTERNAL MEDICINE | Admitting: INTERNAL MEDICINE
Payer: MEDICARE

## 2018-09-01 ENCOUNTER — OUTPATIENT (OUTPATIENT)
Dept: OUTPATIENT SERVICES | Facility: HOSPITAL | Age: 73
LOS: 1 days | End: 2018-09-01
Payer: MEDICARE

## 2018-09-01 VITALS
HEART RATE: 102 BPM | OXYGEN SATURATION: 85 % | DIASTOLIC BLOOD PRESSURE: 12 MMHG | RESPIRATION RATE: 30 BRPM | SYSTOLIC BLOOD PRESSURE: 188 MMHG

## 2018-09-01 DIAGNOSIS — J81.0 ACUTE PULMONARY EDEMA: ICD-10-CM

## 2018-09-01 DIAGNOSIS — R06.02 SHORTNESS OF BREATH: ICD-10-CM

## 2018-09-01 DIAGNOSIS — Z95.1 PRESENCE OF AORTOCORONARY BYPASS GRAFT: Chronic | ICD-10-CM

## 2018-09-01 DIAGNOSIS — N18.3 CHRONIC KIDNEY DISEASE, STAGE 3 (MODERATE): ICD-10-CM

## 2018-09-01 LAB
ALBUMIN SERPL ELPH-MCNC: 3.9 G/DL — SIGNIFICANT CHANGE UP (ref 3.3–5)
ALBUMIN SERPL ELPH-MCNC: 4.3 G/DL — SIGNIFICANT CHANGE UP (ref 3.3–5)
ALP SERPL-CCNC: 63 U/L — SIGNIFICANT CHANGE UP (ref 40–120)
ALP SERPL-CCNC: 82 U/L — SIGNIFICANT CHANGE UP (ref 40–120)
ALT FLD-CCNC: 16 U/L — SIGNIFICANT CHANGE UP (ref 4–33)
ALT FLD-CCNC: 20 U/L — SIGNIFICANT CHANGE UP (ref 4–33)
ANISOCYTOSIS BLD QL: SLIGHT — SIGNIFICANT CHANGE UP
APPEARANCE UR: CLEAR — SIGNIFICANT CHANGE UP
APTT BLD: 38.9 SEC — HIGH (ref 27.5–37.4)
AST SERPL-CCNC: 22 U/L — SIGNIFICANT CHANGE UP (ref 4–32)
AST SERPL-CCNC: 29 U/L — SIGNIFICANT CHANGE UP (ref 4–32)
B PERT DNA SPEC QL NAA+PROBE: SIGNIFICANT CHANGE UP
B-OH-BUTYR SERPL-SCNC: 0.1 MMOL/L — SIGNIFICANT CHANGE UP (ref 0–0.4)
BASE EXCESS BLDA CALC-SCNC: -3.7 MMOL/L — SIGNIFICANT CHANGE UP
BASE EXCESS BLDV CALC-SCNC: -4.2 MMOL/L — SIGNIFICANT CHANGE UP
BASE EXCESS BLDV CALC-SCNC: -9.1 MMOL/L — SIGNIFICANT CHANGE UP
BASOPHILS # BLD AUTO: 0.15 K/UL — SIGNIFICANT CHANGE UP (ref 0–0.2)
BASOPHILS NFR BLD AUTO: 0.7 % — SIGNIFICANT CHANGE UP (ref 0–2)
BASOPHILS NFR SPEC: 0 % — SIGNIFICANT CHANGE UP (ref 0–2)
BILIRUB SERPL-MCNC: 0.4 MG/DL — SIGNIFICANT CHANGE UP (ref 0.2–1.2)
BILIRUB SERPL-MCNC: 0.5 MG/DL — SIGNIFICANT CHANGE UP (ref 0.2–1.2)
BILIRUB UR-MCNC: NEGATIVE — SIGNIFICANT CHANGE UP
BLASTS # FLD: 0 % — SIGNIFICANT CHANGE UP (ref 0–0)
BLD GP AB SCN SERPL QL: NEGATIVE — SIGNIFICANT CHANGE UP
BLOOD GAS VENOUS - CREATININE: 1.77 MG/DL — HIGH (ref 0.5–1.3)
BLOOD GAS VENOUS - CREATININE: 1.83 MG/DL — HIGH (ref 0.5–1.3)
BLOOD UR QL VISUAL: SIGNIFICANT CHANGE UP
BUN SERPL-MCNC: 28 MG/DL — HIGH (ref 7–23)
BUN SERPL-MCNC: 31 MG/DL — HIGH (ref 7–23)
C PNEUM DNA SPEC QL NAA+PROBE: NOT DETECTED — SIGNIFICANT CHANGE UP
CA-I BLDA-SCNC: 1.14 MMOL/L — LOW (ref 1.15–1.29)
CALCIUM SERPL-MCNC: 8.9 MG/DL — SIGNIFICANT CHANGE UP (ref 8.4–10.5)
CALCIUM SERPL-MCNC: 9.2 MG/DL — SIGNIFICANT CHANGE UP (ref 8.4–10.5)
CHLORIDE BLDV-SCNC: 109 MMOL/L — HIGH (ref 96–108)
CHLORIDE BLDV-SCNC: 109 MMOL/L — HIGH (ref 96–108)
CHLORIDE SERPL-SCNC: 102 MMOL/L — SIGNIFICANT CHANGE UP (ref 98–107)
CHLORIDE SERPL-SCNC: 99 MMOL/L — SIGNIFICANT CHANGE UP (ref 98–107)
CK MB BLD-MCNC: 4.51 NG/ML — SIGNIFICANT CHANGE UP (ref 1–4.7)
CK SERPL-CCNC: 193 U/L — HIGH (ref 25–170)
CO2 SERPL-SCNC: 17 MMOL/L — LOW (ref 22–31)
CO2 SERPL-SCNC: 21 MMOL/L — LOW (ref 22–31)
COLOR SPEC: YELLOW — SIGNIFICANT CHANGE UP
CREAT SERPL-MCNC: 1.66 MG/DL — HIGH (ref 0.5–1.3)
CREAT SERPL-MCNC: 1.67 MG/DL — HIGH (ref 0.5–1.3)
EOSINOPHIL # BLD AUTO: 0.58 K/UL — HIGH (ref 0–0.5)
EOSINOPHIL NFR BLD AUTO: 2.7 % — SIGNIFICANT CHANGE UP (ref 0–6)
EOSINOPHIL NFR FLD: 4.4 % — SIGNIFICANT CHANGE UP (ref 0–6)
FLUAV H1 2009 PAND RNA SPEC QL NAA+PROBE: NOT DETECTED — SIGNIFICANT CHANGE UP
FLUAV H1 RNA SPEC QL NAA+PROBE: NOT DETECTED — SIGNIFICANT CHANGE UP
FLUAV H3 RNA SPEC QL NAA+PROBE: NOT DETECTED — SIGNIFICANT CHANGE UP
FLUAV SUBTYP SPEC NAA+PROBE: SIGNIFICANT CHANGE UP
FLUBV RNA SPEC QL NAA+PROBE: NOT DETECTED — SIGNIFICANT CHANGE UP
GAS PNL BLDV: 136 MMOL/L — SIGNIFICANT CHANGE UP (ref 136–146)
GAS PNL BLDV: 138 MMOL/L — SIGNIFICANT CHANGE UP (ref 136–146)
GIANT PLATELETS BLD QL SMEAR: PRESENT — SIGNIFICANT CHANGE UP
GLUCOSE BLDA-MCNC: 309 MG/DL — HIGH (ref 70–99)
GLUCOSE BLDV-MCNC: 309 — HIGH (ref 70–99)
GLUCOSE BLDV-MCNC: 353 — HIGH (ref 70–99)
GLUCOSE SERPL-MCNC: 183 MG/DL — HIGH (ref 70–99)
GLUCOSE SERPL-MCNC: 336 MG/DL — HIGH (ref 70–99)
GLUCOSE UR-MCNC: 250 — SIGNIFICANT CHANGE UP
HADV DNA SPEC QL NAA+PROBE: NOT DETECTED — SIGNIFICANT CHANGE UP
HCO3 BLDA-SCNC: 22 MMOL/L — SIGNIFICANT CHANGE UP (ref 22–26)
HCO3 BLDV-SCNC: 15 MMOL/L — LOW (ref 20–27)
HCO3 BLDV-SCNC: 20 MMOL/L — SIGNIFICANT CHANGE UP (ref 20–27)
HCOV 229E RNA SPEC QL NAA+PROBE: NOT DETECTED — SIGNIFICANT CHANGE UP
HCOV HKU1 RNA SPEC QL NAA+PROBE: NOT DETECTED — SIGNIFICANT CHANGE UP
HCOV NL63 RNA SPEC QL NAA+PROBE: NOT DETECTED — SIGNIFICANT CHANGE UP
HCOV OC43 RNA SPEC QL NAA+PROBE: NOT DETECTED — SIGNIFICANT CHANGE UP
HCT VFR BLD CALC: 45.3 % — HIGH (ref 34.5–45)
HCT VFR BLDA CALC: 39.3 % — SIGNIFICANT CHANGE UP (ref 34.5–46.5)
HCT VFR BLDV CALC: 40.3 % — SIGNIFICANT CHANGE UP (ref 34.5–45)
HCT VFR BLDV CALC: 44.6 % — SIGNIFICANT CHANGE UP (ref 34.5–45)
HGB BLD-MCNC: 14.2 G/DL — SIGNIFICANT CHANGE UP (ref 11.5–15.5)
HGB BLDA-MCNC: 12.8 G/DL — SIGNIFICANT CHANGE UP (ref 11.5–15.5)
HGB BLDV-MCNC: 13.1 G/DL — SIGNIFICANT CHANGE UP (ref 11.5–15.5)
HGB BLDV-MCNC: 14.5 G/DL — SIGNIFICANT CHANGE UP (ref 11.5–15.5)
HMPV RNA SPEC QL NAA+PROBE: NOT DETECTED — SIGNIFICANT CHANGE UP
HPIV1 RNA SPEC QL NAA+PROBE: NOT DETECTED — SIGNIFICANT CHANGE UP
HPIV2 RNA SPEC QL NAA+PROBE: NOT DETECTED — SIGNIFICANT CHANGE UP
HPIV3 RNA SPEC QL NAA+PROBE: NOT DETECTED — SIGNIFICANT CHANGE UP
HPIV4 RNA SPEC QL NAA+PROBE: NOT DETECTED — SIGNIFICANT CHANGE UP
IMM GRANULOCYTES # BLD AUTO: 0.42 # — SIGNIFICANT CHANGE UP
IMM GRANULOCYTES NFR BLD AUTO: 1.9 % — HIGH (ref 0–1.5)
INR BLD: 0.91 — SIGNIFICANT CHANGE UP (ref 0.88–1.17)
KETONES UR-MCNC: NEGATIVE — SIGNIFICANT CHANGE UP
L PNEUMO AG UR QL: NEGATIVE — SIGNIFICANT CHANGE UP
LACTATE BLDA-SCNC: 2.1 MMOL/L — HIGH (ref 0.5–2)
LACTATE BLDV-MCNC: 2.8 MMOL/L — HIGH (ref 0.5–2)
LACTATE BLDV-MCNC: 5.6 MMOL/L — CRITICAL HIGH (ref 0.5–2)
LEUKOCYTE ESTERASE UR-ACNC: NEGATIVE — SIGNIFICANT CHANGE UP
LYMPHOCYTES # BLD AUTO: 43.2 % — SIGNIFICANT CHANGE UP (ref 13–44)
LYMPHOCYTES # BLD AUTO: 9.4 K/UL — HIGH (ref 1–3.3)
LYMPHOCYTES NFR SPEC AUTO: 29 % — SIGNIFICANT CHANGE UP (ref 13–44)
M PNEUMO DNA SPEC QL NAA+PROBE: NOT DETECTED — SIGNIFICANT CHANGE UP
MACROCYTES BLD QL: SLIGHT — SIGNIFICANT CHANGE UP
MAGNESIUM SERPL-MCNC: 1.9 MG/DL — SIGNIFICANT CHANGE UP (ref 1.6–2.6)
MAGNESIUM SERPL-MCNC: 2.1 MG/DL — SIGNIFICANT CHANGE UP (ref 1.6–2.6)
MCHC RBC-ENTMCNC: 29.6 PG — SIGNIFICANT CHANGE UP (ref 27–34)
MCHC RBC-ENTMCNC: 31.3 % — LOW (ref 32–36)
MCV RBC AUTO: 94.4 FL — SIGNIFICANT CHANGE UP (ref 80–100)
METAMYELOCYTES # FLD: 0 % — SIGNIFICANT CHANGE UP (ref 0–1)
MONOCYTES # BLD AUTO: 1.57 K/UL — HIGH (ref 0–0.9)
MONOCYTES NFR BLD AUTO: 7.2 % — SIGNIFICANT CHANGE UP (ref 2–14)
MONOCYTES NFR BLD: 6.1 % — SIGNIFICANT CHANGE UP (ref 2–9)
MUCOUS THREADS # UR AUTO: SIGNIFICANT CHANGE UP
MYELOCYTES NFR BLD: 0 % — SIGNIFICANT CHANGE UP (ref 0–0)
NEUTROPHIL AB SER-ACNC: 46.5 % — SIGNIFICANT CHANGE UP (ref 43–77)
NEUTROPHILS # BLD AUTO: 9.64 K/UL — HIGH (ref 1.8–7.4)
NEUTROPHILS NFR BLD AUTO: 44.3 % — SIGNIFICANT CHANGE UP (ref 43–77)
NEUTS BAND # BLD: 0 % — SIGNIFICANT CHANGE UP (ref 0–6)
NITRITE UR-MCNC: NEGATIVE — SIGNIFICANT CHANGE UP
NRBC # FLD: 0 — SIGNIFICANT CHANGE UP
NT-PROBNP SERPL-SCNC: 1783 PG/ML — SIGNIFICANT CHANGE UP
OTHER - HEMATOLOGY %: 0 — SIGNIFICANT CHANGE UP
PCO2 BLDA: 32 MMHG — SIGNIFICANT CHANGE UP (ref 32–48)
PCO2 BLDV: 44 MMHG — SIGNIFICANT CHANGE UP (ref 41–51)
PCO2 BLDV: 59 MMHG — HIGH (ref 41–51)
PH BLDA: 7.41 PH — SIGNIFICANT CHANGE UP (ref 7.35–7.45)
PH BLDV: 7.13 PH — CRITICAL LOW (ref 7.32–7.43)
PH BLDV: 7.31 PH — LOW (ref 7.32–7.43)
PH UR: 6.5 — SIGNIFICANT CHANGE UP (ref 5–8)
PHOSPHATE SERPL-MCNC: 2.7 MG/DL — SIGNIFICANT CHANGE UP (ref 2.5–4.5)
PHOSPHATE SERPL-MCNC: 5.7 MG/DL — HIGH (ref 2.5–4.5)
PLATELET # BLD AUTO: 354 K/UL — SIGNIFICANT CHANGE UP (ref 150–400)
PLATELET COUNT - ESTIMATE: NORMAL — SIGNIFICANT CHANGE UP
PMV BLD: 9.2 FL — SIGNIFICANT CHANGE UP (ref 7–13)
PO2 BLDA: 212 MMHG — HIGH (ref 83–108)
PO2 BLDV: 55 MMHG — HIGH (ref 35–40)
PO2 BLDV: 56 MMHG — HIGH (ref 35–40)
POLYCHROMASIA BLD QL SMEAR: SLIGHT — SIGNIFICANT CHANGE UP
POTASSIUM BLDA-SCNC: 3.8 MMOL/L — SIGNIFICANT CHANGE UP (ref 3.4–4.5)
POTASSIUM BLDV-SCNC: 3.4 MMOL/L — SIGNIFICANT CHANGE UP (ref 3.4–4.5)
POTASSIUM BLDV-SCNC: 5.4 MMOL/L — HIGH (ref 3.4–4.5)
POTASSIUM SERPL-MCNC: 3.6 MMOL/L — SIGNIFICANT CHANGE UP (ref 3.5–5.3)
POTASSIUM SERPL-MCNC: 4 MMOL/L — SIGNIFICANT CHANGE UP (ref 3.5–5.3)
POTASSIUM SERPL-SCNC: 3.6 MMOL/L — SIGNIFICANT CHANGE UP (ref 3.5–5.3)
POTASSIUM SERPL-SCNC: 4 MMOL/L — SIGNIFICANT CHANGE UP (ref 3.5–5.3)
PROMYELOCYTES # FLD: 0 % — SIGNIFICANT CHANGE UP (ref 0–0)
PROT SERPL-MCNC: 7.7 G/DL — SIGNIFICANT CHANGE UP (ref 6–8.3)
PROT SERPL-MCNC: 8.5 G/DL — HIGH (ref 6–8.3)
PROT UR-MCNC: HIGH
PROTHROM AB SERPL-ACNC: 10.5 SEC — SIGNIFICANT CHANGE UP (ref 9.8–13.1)
RBC # BLD: 4.8 M/UL — SIGNIFICANT CHANGE UP (ref 3.8–5.2)
RBC # FLD: 14.1 % — SIGNIFICANT CHANGE UP (ref 10.3–14.5)
RBC CASTS # UR COMP ASSIST: SIGNIFICANT CHANGE UP (ref 0–?)
REVIEW TO FOLLOW: YES — SIGNIFICANT CHANGE UP
RH IG SCN BLD-IMP: POSITIVE — SIGNIFICANT CHANGE UP
RSV RNA SPEC QL NAA+PROBE: NOT DETECTED — SIGNIFICANT CHANGE UP
RV+EV RNA SPEC QL NAA+PROBE: NOT DETECTED — SIGNIFICANT CHANGE UP
SAO2 % BLDA: 98.9 % — SIGNIFICANT CHANGE UP (ref 95–99)
SAO2 % BLDV: 73.8 % — SIGNIFICANT CHANGE UP (ref 60–85)
SAO2 % BLDV: 81.4 % — SIGNIFICANT CHANGE UP (ref 60–85)
SODIUM BLDA-SCNC: 135 MMOL/L — LOW (ref 136–146)
SODIUM SERPL-SCNC: 139 MMOL/L — SIGNIFICANT CHANGE UP (ref 135–145)
SODIUM SERPL-SCNC: 140 MMOL/L — SIGNIFICANT CHANGE UP (ref 135–145)
SP GR SPEC: 1.01 — SIGNIFICANT CHANGE UP (ref 1–1.04)
SQUAMOUS # UR AUTO: SIGNIFICANT CHANGE UP
TROPONIN T, HIGH SENSITIVITY: 27 NG/L — SIGNIFICANT CHANGE UP (ref ?–14)
TROPONIN T, HIGH SENSITIVITY: 33 NG/L — SIGNIFICANT CHANGE UP (ref ?–14)
TROPONIN T, HIGH SENSITIVITY: 52 NG/L — HIGH (ref ?–14)
TROPONIN T, HIGH SENSITIVITY: 55 NG/L — CRITICAL HIGH (ref ?–14)
UROBILINOGEN FLD QL: NORMAL — SIGNIFICANT CHANGE UP
VARIANT LYMPHS # BLD: 14 % — SIGNIFICANT CHANGE UP
WBC # BLD: 21.76 K/UL — HIGH (ref 3.8–10.5)
WBC # FLD AUTO: 21.76 K/UL — HIGH (ref 3.8–10.5)
WBC UR QL: SIGNIFICANT CHANGE UP (ref 0–?)

## 2018-09-01 PROCEDURE — G9001: CPT

## 2018-09-01 PROCEDURE — 99291 CRITICAL CARE FIRST HOUR: CPT

## 2018-09-01 PROCEDURE — 71045 X-RAY EXAM CHEST 1 VIEW: CPT | Mod: 26

## 2018-09-01 RX ORDER — FUROSEMIDE 40 MG
40 TABLET ORAL ONCE
Qty: 0 | Refills: 0 | Status: COMPLETED | OUTPATIENT
Start: 2018-09-01 | End: 2018-09-01

## 2018-09-01 RX ORDER — INSULIN LISPRO 100/ML
VIAL (ML) SUBCUTANEOUS EVERY 6 HOURS
Qty: 0 | Refills: 0 | Status: DISCONTINUED | OUTPATIENT
Start: 2018-09-01 | End: 2018-09-01

## 2018-09-01 RX ORDER — INSULIN GLARGINE 100 [IU]/ML
40 INJECTION, SOLUTION SUBCUTANEOUS ONCE
Qty: 0 | Refills: 0 | Status: COMPLETED | OUTPATIENT
Start: 2018-09-01 | End: 2018-09-01

## 2018-09-01 RX ORDER — AZITHROMYCIN 500 MG/1
500 TABLET, FILM COATED ORAL EVERY 24 HOURS
Qty: 0 | Refills: 0 | Status: DISCONTINUED | OUTPATIENT
Start: 2018-09-02 | End: 2018-09-02

## 2018-09-01 RX ORDER — NOREPINEPHRINE BITARTRATE/D5W 8 MG/250ML
0.05 PLASTIC BAG, INJECTION (ML) INTRAVENOUS
Qty: 8 | Refills: 0 | Status: DISCONTINUED | OUTPATIENT
Start: 2018-09-01 | End: 2018-09-01

## 2018-09-01 RX ORDER — FUROSEMIDE 40 MG
80 TABLET ORAL ONCE
Qty: 0 | Refills: 0 | Status: COMPLETED | OUTPATIENT
Start: 2018-09-01 | End: 2018-09-01

## 2018-09-01 RX ORDER — ASPIRIN/CALCIUM CARB/MAGNESIUM 324 MG
325 TABLET ORAL ONCE
Qty: 0 | Refills: 0 | Status: COMPLETED | OUTPATIENT
Start: 2018-09-01 | End: 2018-09-01

## 2018-09-01 RX ORDER — INSULIN LISPRO 100/ML
VIAL (ML) SUBCUTANEOUS
Qty: 0 | Refills: 0 | Status: DISCONTINUED | OUTPATIENT
Start: 2018-09-01 | End: 2018-09-10

## 2018-09-01 RX ORDER — CEFTRIAXONE 500 MG/1
1 INJECTION, POWDER, FOR SOLUTION INTRAMUSCULAR; INTRAVENOUS EVERY 24 HOURS
Qty: 0 | Refills: 0 | Status: DISCONTINUED | OUTPATIENT
Start: 2018-09-02 | End: 2018-09-02

## 2018-09-01 RX ORDER — HYDRALAZINE HCL 50 MG
10 TABLET ORAL ONCE
Qty: 0 | Refills: 0 | Status: COMPLETED | OUTPATIENT
Start: 2018-09-01 | End: 2018-09-01

## 2018-09-01 RX ORDER — CEFTRIAXONE 500 MG/1
1 INJECTION, POWDER, FOR SOLUTION INTRAMUSCULAR; INTRAVENOUS ONCE
Qty: 0 | Refills: 0 | Status: COMPLETED | OUTPATIENT
Start: 2018-09-01 | End: 2018-09-01

## 2018-09-01 RX ORDER — FUROSEMIDE 40 MG
40 TABLET ORAL ONCE
Qty: 0 | Refills: 0 | Status: DISCONTINUED | OUTPATIENT
Start: 2018-09-01 | End: 2018-09-01

## 2018-09-01 RX ORDER — AZITHROMYCIN 500 MG/1
TABLET, FILM COATED ORAL
Qty: 0 | Refills: 0 | Status: DISCONTINUED | OUTPATIENT
Start: 2018-09-01 | End: 2018-09-02

## 2018-09-01 RX ORDER — AZITHROMYCIN 500 MG/1
500 TABLET, FILM COATED ORAL ONCE
Qty: 0 | Refills: 0 | Status: COMPLETED | OUTPATIENT
Start: 2018-09-01 | End: 2018-09-01

## 2018-09-01 RX ORDER — SODIUM CHLORIDE 9 MG/ML
100 INJECTION INTRAMUSCULAR; INTRAVENOUS; SUBCUTANEOUS ONCE
Qty: 0 | Refills: 0 | Status: COMPLETED | OUTPATIENT
Start: 2018-09-01 | End: 2018-09-01

## 2018-09-01 RX ORDER — CEFTRIAXONE 500 MG/1
2 INJECTION, POWDER, FOR SOLUTION INTRAMUSCULAR; INTRAVENOUS EVERY 24 HOURS
Qty: 0 | Refills: 0 | Status: DISCONTINUED | OUTPATIENT
Start: 2018-09-01 | End: 2018-09-01

## 2018-09-01 RX ORDER — INSULIN GLARGINE 100 [IU]/ML
60 INJECTION, SOLUTION SUBCUTANEOUS AT BEDTIME
Qty: 0 | Refills: 0 | Status: DISCONTINUED | OUTPATIENT
Start: 2018-09-01 | End: 2018-09-08

## 2018-09-01 RX ORDER — INSULIN GLARGINE 100 [IU]/ML
20 INJECTION, SOLUTION SUBCUTANEOUS EVERY MORNING
Qty: 0 | Refills: 0 | Status: DISCONTINUED | OUTPATIENT
Start: 2018-09-01 | End: 2018-09-08

## 2018-09-01 RX ORDER — HEPARIN SODIUM 5000 [USP'U]/ML
5000 INJECTION INTRAVENOUS; SUBCUTANEOUS EVERY 8 HOURS
Qty: 0 | Refills: 0 | Status: DISCONTINUED | OUTPATIENT
Start: 2018-09-01 | End: 2018-09-10

## 2018-09-01 RX ORDER — NITROGLYCERIN 6.5 MG
0.4 CAPSULE, EXTENDED RELEASE ORAL ONCE
Qty: 0 | Refills: 0 | Status: COMPLETED | OUTPATIENT
Start: 2018-09-01 | End: 2018-09-01

## 2018-09-01 RX ADMIN — HEPARIN SODIUM 5000 UNIT(S): 5000 INJECTION INTRAVENOUS; SUBCUTANEOUS at 14:33

## 2018-09-01 RX ADMIN — HEPARIN SODIUM 5000 UNIT(S): 5000 INJECTION INTRAVENOUS; SUBCUTANEOUS at 05:44

## 2018-09-01 RX ADMIN — Medication 6: at 05:46

## 2018-09-01 RX ADMIN — INSULIN GLARGINE 40 UNIT(S): 100 INJECTION, SOLUTION SUBCUTANEOUS at 21:39

## 2018-09-01 RX ADMIN — Medication 40 MILLIGRAM(S): at 02:44

## 2018-09-01 RX ADMIN — Medication 0.4 MILLIGRAM(S): at 02:02

## 2018-09-01 RX ADMIN — SODIUM CHLORIDE 600 MILLILITER(S): 9 INJECTION INTRAMUSCULAR; INTRAVENOUS; SUBCUTANEOUS at 03:22

## 2018-09-01 RX ADMIN — SODIUM CHLORIDE 100 MILLILITER(S): 9 INJECTION INTRAMUSCULAR; INTRAVENOUS; SUBCUTANEOUS at 03:33

## 2018-09-01 RX ADMIN — Medication 325 MILLIGRAM(S): at 02:26

## 2018-09-01 RX ADMIN — Medication 80 MILLIGRAM(S): at 02:13

## 2018-09-01 RX ADMIN — CEFTRIAXONE 100 GRAM(S): 500 INJECTION, POWDER, FOR SOLUTION INTRAMUSCULAR; INTRAVENOUS at 03:22

## 2018-09-01 RX ADMIN — AZITHROMYCIN 250 MILLIGRAM(S): 500 TABLET, FILM COATED ORAL at 03:53

## 2018-09-01 RX ADMIN — Medication 10 MILLIGRAM(S): at 02:26

## 2018-09-01 RX ADMIN — INSULIN GLARGINE 20 UNIT(S): 100 INJECTION, SOLUTION SUBCUTANEOUS at 09:38

## 2018-09-01 RX ADMIN — Medication 0.4 MILLIGRAM(S): at 02:13

## 2018-09-01 RX ADMIN — CEFTRIAXONE 1 GRAM(S): 500 INJECTION, POWDER, FOR SOLUTION INTRAMUSCULAR; INTRAVENOUS at 03:53

## 2018-09-01 RX ADMIN — HEPARIN SODIUM 5000 UNIT(S): 5000 INJECTION INTRAVENOUS; SUBCUTANEOUS at 21:12

## 2018-09-01 NOTE — ED PROVIDER NOTE - CRITICAL CARE PROVIDED
consult w/ pt's family directly relating to pts condition/additional history taking/interpretation of diagnostic studies/documentation/direct patient care (not related to procedure)/consultation with other physicians/conducted a detailed discussion of DNR status

## 2018-09-01 NOTE — PROGRESS NOTE ADULT - ASSESSMENT
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     #Neuro  - No active issues     #Skin  - No active issues    # Resp  - Acute onset SOB likely 2/2 acute on chronic HF exacerbation. CXR c/w pulmonary edema.  - POCUS w/ bilateral B- lines and R base w/ static air bronchograms c/w atelectatic lung.   - C/w BiPAP for support of labored breathing and aggressive diuresis       # Cardio  - Hx of CAD s/p stent (2017) and CABG and HFrEF (36%)  - EKG showed no evidence of ischemia or acute infarct; HsTrops uptrended to 55 will repeat and evaluate. EKG shows no evidence of ST elevations, depressions or T wave inversions  - Will c/w home Toprol-XL 12.5mg, ticagrelor 90mg, ASA 81mg, Atorvastatin 40mg  when Pt can tolerate PO.  - Will follow I/Os, reassess and give lasix PRN; Goals net negative 1-2L    #GI  - Hx of GERD  - Will continue her on home ranitidine when Pt can tolerate PO.    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

## 2018-09-01 NOTE — ED PROVIDER NOTE - ATTENDING CONTRIBUTION TO CARE
Migue BORJAS- 73 Y/O F with h/o htn, hld, cad s/p cabg on Brilinta, chf on lasix, ckd but not on hd p/w sudden onset fo sob this evening and ws bibems on nrb and was hypoxic to 84% and tachypneic int he filed. No chest pain, syncope or fever. Pt speaks Greenlandic and daughter translating, refused . No fall or trauma. No recent illness. Pt has no dysuria, hematuria or flank pain or abd pain    pt is alert, tachypneic , in acute resp distress, speaking in few words but unable to speak full sentence w/o getting sob, tachycardic to 128, b/l profuse wheezing, jvd+, trachea midline, abd soft, nt, obese abd, no focal tenderness, no cvat b/l, neuro exam aox3, gcs 15, same strenght in all 4 ext, skin warm, dry, mild pedal edema    plan to place her on bipap, ekg showed st elevations 3 mm in at leads with reciprocal depression in 1, avl and called stemi code and Luke Carlos recommend no emergent cath and no stemi at this time, called cc uf or acute pulm edema. Pt was given 80 mg lasix, 04. sl nitro x2, aspirin    later found to have elevated wbc with metabolic acidosis with lactic acidosis, will start on ceftriaxone and azithro iv but no temp at home or here. will call MICU as well, repeat vbg

## 2018-09-01 NOTE — H&P ADULT - ASSESSMENT
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     #Neuro  - no acute issues    #Skin  - no acute issue    # Resp  - Acute SOB suspicious for infectious etiology given acuity, history, leukocytosis and imaging (diffuse edema on CXR)  - C/w BiPAP for support of labored breathing    - RVP to r/o viral infection    # Cardio  - Hx of CAD s/p stent (2017) and CABG and HFrEF (36%)  - EKG showed no evidence of ischemia or acute infarct; Pt w/ no CP; HsTrops: negative.   - Will c/w home meds when Pt can tolerate PO.    #GI  - Hx of GERD  - Will continue her on home ranitidine when Pt can tolerate PO.    # Endocrine  - Hx of T2DM; takes 20U glargine in AM/ 68U glargine in PM at home  - Hx of hypothyroidism  - Will start Lantus 20U/60U  - Will monitor FS q6hr  - Will start on ISS moderate intensity  - Will start on home dose of levothyroxine when Pt can tolerate PO.     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     #Neuro  - no acute issues    #Skin  - no acute issue    # Resp  - Acute SOB suspicious for infectious etiology given acuity, history, leukocytosis and imaging (diffuse edema on CXR)  - C/w BiPAP for support of labored breathing    - RVP to r/o viral infection    # Cardio  - Hx of CAD s/p stent (2017) and CABG and HFrEF (36%)  - EKG showed no evidence of ischemia or acute infarct; Pt w/ no CP; HsTrops: negative.   - Will c/w home Toprol-XL 25mg, ticagrelor 90mg, ASA 81mg, Atorvastatin 40mg  when Pt can tolerate PO.  - Will c/w with home lasix    #GI  - Hx of GERD  - Will continue her on home ranitidine when Pt can tolerate PO.    # Endocrine  - Hx of T2DM; takes 20U glargine in AM/ 68U glargine in PM at home  - Hx of hypothyroidism  - Will start Lantus 20U/60U  - Will monitor FS q6hr  - Will start on ISS moderate intensity  - Will start on home dose of levothyroxine when Pt can tolerate PO.     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     #Neuro  - no acute issues    #Skin  - no acute issue    # Resp  - Acute SOB likely 2/2 CHF exacerbation (given BNP elevation, imaging, history of HFref)  - C/w BiPAP for support of labored breathing        # Cardio  - Hx of CAD s/p stent (2017) and CABG and HFrEF (36%)  - EKG showed no evidence of ischemia or acute infarct; Pt w/ no CP; HsTrops: 27 -->33.   - Will perform another EKG in AM  - Will c/w home Toprol-XL 12.5mg, ticagrelor 90mg, ASA 81mg, Atorvastatin 40mg  when Pt can tolerate PO.  - Will follow I/Os, reassess and give lasix PRN; Goals net negative 1-2L    #GI  - Hx of GERD  - Will continue her on home ranitidine when Pt can tolerate PO.    # Endocrine  - Hx of T2DM (A1c: ~ 9) ; takes 20U glargine in AM/ 68U glargine in PM at home  - Hx of hypothyroidism  - Will start Lantus 20U/60U  - Will monitor FS q6hr  - Will start on ISS moderate intensity because of poorly controlled  - Will start on home dose of levothyroxine when Pt can tolerate PO.     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

## 2018-09-01 NOTE — ED PROVIDER NOTE - PROGRESS NOTE DETAILS
Angy PGY1- severe resp distress with dropping O2 sats in mid 80s and tachy to 140s, nitro sublingual x2 given, lasix 80 IV, bilevel called for, after bilevel and medication interventions much improved vitals and respiratory status, EKG obtained, code STEMI called due to ST-T changes on EKG that were new since EKG in July. On call interventionalist advises no cath lab at this time. CCU called and will come eval pt. upon serology revealing leukocytosis, DKA, lactic acidosis, ph-7.2 and sig anion gap, broad spectrum abx given and MICU consulted, they will accept (Dr. Ifeoma Kearns. Haverty PGY1- severe resp distress with dropping O2 sats in mid 80s and tachy to 140s, nitro sublingual x2 given, lasix 80 IV, bilevel called for, after bilevel and medication interventions much improved vitals and respiratory status, EKG obtained, code STEMI called due to ST-T changes on EKG that were new since EKG in July. On call interventionalist advises no cath lab at this time. CCU called and will come eval pt. upon serology revealing leukocytosis, DKA, lactic acidosis, ph-7.2 and sig anion gap, broad spectrum abx given and MICU consulted, they will accept (Dr. Ifeoma Kearns).     ddx includes acute CHF, ACS, sepsis, ARDS, critically ill patient, new Bipap requirement, needs ICU care

## 2018-09-01 NOTE — H&P ADULT - ATTENDING COMMENTS
73 yo pt with significant cardiac hx; CAD s/p CABG and stent; systolic HF, HTN, CKD  Sudden onset SOB last night; hypoxic and hypertensive in ED CXR c/w pulm edema  On BiPAP in ED s/p lasix and NTG and hydralazine  WBC elevated; AG; elevated lactate  EKG without signif changes from previous; trop nl  POCUS showed bilat B lines throughout with smooth pleura (somewhat rough pleura L ant chest; no Pleff; no consolidation; R base small area of static air bronchograms c/w atalectatic lung; Cor severely reduced LVF; IVC appears small and collapses with inspiration  - acute on chronic CHF with pulm edema likely but not straight forward; improved on BiPAP and lasix; remains tachypnic  - cannot r/o PNA, element of noncardiogenic Pulm Edema at this time  - does not appear to have acute ischemia    lasix; follow I/O; lytes  continue Abx; f/u cultures, lactate, viral panel  repeat EKG in am  control BP  continue BiPAP; check ABG  Guarded

## 2018-09-01 NOTE — ED PROVIDER NOTE - OBJECTIVE STATEMENT
71 y/o female with PMHx CABG (2014), DM, HTN, hypothyroid, GERD, HLD presents to ED acutely SOB. Symptoms started suddenly this am. Was feeling well yesterday. No complaints during the day. Here in ED patient is very short of breath and mostly nods and shakers her head. Denies CP. Significant respiratory distress limits history. Family reports patient is on Lasix at home.

## 2018-09-01 NOTE — ED ADULT NURSE NOTE - OBJECTIVE STATEMENT
Pt brought in by EMS directly to Trauma B for respiratory distress, pt mainly Polish speaking, unable to speak in full sentences, respirations labored b/l. Pt arrives on nonrebreather, sating 85%. Respiratory called, pt placed on bipap 15/5 fio2 100%, sating 97%. Md Migue at bedside for eval. EKG completed, suspected STEMI, STEMI alert called. IVL 18g Angiocath placed on right forearm. Labs sent. Awaiting further orders. Will continue to monitor.

## 2018-09-01 NOTE — H&P ADULT - NSHPSOCIALHISTORY_GEN_ALL_CORE
Pt lives with daughter. Minimally ambulatory, requires assistance with ADLs. No drug, ETOH, tobacco use.

## 2018-09-01 NOTE — ED PROVIDER NOTE - PHYSICAL EXAMINATION
PHYSICAL EXAM:  GENERAL: sig. resp distress, alert, follows commands, nods and shakes head, appears chronically ill  HEAD:  Atraumatic, Normocephalic  EYES: EOMI, PERRLA, conjunctiva and sclera clear  ENMT: No tonsillar erythema, exudates, or enlargement; Moist mucous membranes  NECK: Supple, full ROM  HEART: tachycardic to 140s, regular, no murmurs  RESPIRATORY: diffuse crackles in all fields, anterioly as well, significant respiratory distress, tachypneic   ABDOMEN: Soft, Nontender, Nondistended  NEUROLOGY: A&Ox3, nonfocal, moving all extremities  EXTREMITIES:  2+ Peripheral Pulses, warm, well perfused  SKIN: warm, dry, normal color

## 2018-09-01 NOTE — ED ADULT NURSE NOTE - NSIMPLEMENTINTERV_GEN_ALL_ED
Implemented All Fall Risk Interventions:  [place] to call system. Call bell, personal items and telephone within reach. Instruct patient to call for assistance. Room bathroom lighting operational. Non-slip footwear when patient is off stretcher. Physically safe environment: no spills, clutter or unnecessary equipment. Stretcher in lowest position, wheels locked, appropriate side rails in place. Provide visual cue, wrist band, yellow gown, etc. Monitor gait and stability. Monitor for mental status changes and reorient to person, place, and time. Review medications for side effects contributing to fall risk. Reinforce activity limits and safety measures with patient and family.

## 2018-09-01 NOTE — PROGRESS NOTE ADULT - SUBJECTIVE AND OBJECTIVE BOX
INTERVAL HPI/OVERNIGHT EVENTS:    SUBJECTIVE: Patient seen and examined at bedside. No acute events over night.         OBJECTIVE:    VITAL SIGNS:  ICU Vital Signs Last 24 Hrs  T(C): 36.5 (01 Sep 2018 12:00), Max: 37.8 (01 Sep 2018 04:55)  T(F): 97.7 (01 Sep 2018 12:00), Max: 100.1 (01 Sep 2018 04:55)  HR: 84 (01 Sep 2018 12:00) (79 - 120)  BP: 108/58 (01 Sep 2018 11:00) (101/59 - 195/83)  BP(mean): 71 (01 Sep 2018 11:00) (68 - 85)  ABP: --  ABP(mean): --  RR: 27 (01 Sep 2018 12:00) (23 - 48)  SpO2: 96% (01 Sep 2018 12:00) (85% - 100%)         @ 07: @ 07:00  --------------------------------------------------------  IN: 0 mL / OUT: 220 mL / NET: -220 mL     @ 07: @ 12:50  --------------------------------------------------------  IN: 0 mL / OUT: 925 mL / NET: -925 mL      CAPILLARY BLOOD GLUCOSE      POCT Blood Glucose.: 145 mg/dL (01 Sep 2018 11:56)      PHYSICAL EXAM:    General: NAD  HEENT: NC/AT; PERRL, clear conjunctiva  Neck: supple  Respiratory: bibasilar crackles   Cardiovascular: +S1/S2; RRR  Abdomen: soft, NT/ND; +BS x4  Extremities: WWP, 2+ peripheral pulses b/l; no LE edema  Skin: normal color and turgor; no rash  Neurological: aaox3    MEDICATIONS:  MEDICATIONS  (STANDING):  azithromycin  IVPB      heparin  Injectable 5000 Unit(s) SubCutaneous every 8 hours  insulin glargine Injectable (LANTUS) 20 Unit(s) SubCutaneous every morning  insulin glargine Injectable (LANTUS) 60 Unit(s) SubCutaneous at bedtime  insulin lispro (HumaLOG) corrective regimen sliding scale   SubCutaneous every 6 hours    MEDICATIONS  (PRN):      ALLERGIES:  Allergies    No Known Allergies    Intolerances        LABS:                        14.2   21.76 )-----------( 354      ( 01 Sep 2018 02:00 )             45.3     09    140  |  102  |  31<H>  ----------------------------<  183<H>  4.0   |  21<L>  |  1.67<H>    Ca    8.9      01 Sep 2018 09:30  Phos  2.7       Mg     1.9         TPro  7.7  /  Alb  3.9  /  TBili  0.5  /  DBili  x   /  AST  22  /  ALT  16  /  AlkPhos  63  09-    PT/INR - ( 01 Sep 2018 02:00 )   PT: 10.5 SEC;   INR: 0.91          PTT - ( 01 Sep 2018 02:00 )  PTT:38.9 SEC  Urinalysis Basic - ( 01 Sep 2018 04:29 )    Color: YELLOW / Appearance: CLEAR / S.015 / pH: 6.5  Gluc: 250 / Ketone: NEGATIVE  / Bili: NEGATIVE / Urobili: NORMAL   Blood: MODERATE / Protein: MODERATE / Nitrite: NEGATIVE   Leuk Esterase: NEGATIVE / RBC: 5-10 / WBC 2-5   Sq Epi: OCC. / Non Sq Epi: x / Bacteria: x        RADIOLOGY & ADDITIONAL TESTS: Reviewed.

## 2018-09-01 NOTE — H&P ADULT - HISTORY OF PRESENT ILLNESS
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 Daughter at beside, and provided details of history. Daughter reported that Pt was in her usual state of health up until this past even when she started to feel short of breath. Daughter additionally reported that the Pt became "congested" with labored breathing. Pt reported that she not experience any N/V/D, fever, cough, CP, Abdominal pain, loose stools, leg swelling,  or rash. Pt additionally reported no recent travel or sick contacts. Pt reported that she take meds as prescribed including diuretics (Lasix 40qd).     In the ED:   Vital Signs Last 24 Hrs  T(C): 37.7 (01 Sep 2018 02:53), Max: 37.7 (01 Sep 2018 02:53)  T(F): 99.8 (01 Sep 2018 02:53), Max: 99.8 (01 Sep 2018 02:53)  HR: 94 (01 Sep 2018 04:07) (87 - 120)  BP: 121/61 (01 Sep 2018 03:54) (106/58 - 195/83)  BP(mean): --  RR: 29 (01 Sep 2018 03:54) (29 - 48)  SpO2: 100% (01 Sep 2018 04:07) (85% - 100%)    Pt, hypoxic to 85%, placed on nonrebreather, tachy to 120s, profuse b/l wheeze. CCU CONSULTED however did not feel Pt presentation was not consistent with heart failure. Pt was placed on BiPAP for increased work of breathing, however remained tachypneic to 38. Pt reported improvement in symptoms however developed dry cough and chest pain. Pt was given 80 mg lasix, 04. sl nitro x2, aspirin 325mg, azithromycin, ceftriaxone, hydralazine 10mg.                14.2                 139  | 17   | 28           21.76 >-----------< 354     ------------------------< 336                   45.3                 3.6  | 99   | 1.66                                         Ca 9.2   Mg 2.1   Ph 5.7      03:10 - VBG - pH: 7.31  | pCO2: 44    | pO2: 55    | Lactate: 2.8    02:00 - VBG - pH: 7.13  | pCO2: 59    | pO2: 56    | Lactate: 5.6    HS troponin 27 lactate 5.6    POC U/S showed diffuse b lines, no pleural effusions b/l. changes consistent with atelectasis. Heart showed severe LV systolic dysfunction w/ poor squeeze.     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 Daughter at beside, and provided details of history. Daughter reported that Pt was in her usual state of health up until this past even when she started to feel short of breath. Daughter additionally reported that the Pt became "congested" with labored breathing. Pt reported that she not experience any N/V/D, fever, cough, CP, Abdominal pain, loose stools, leg swelling,  or rash. Pt additionally reported no recent travel or sick contacts. Pt reported that she take meds as prescribed including diuretics (Lasix 40qd).     In the ED:   Vital Signs Last 24 Hrs  T(C): 37.7 (01 Sep 2018 02:53), Max: 37.7 (01 Sep 2018 02:53)  T(F): 99.8 (01 Sep 2018 02:53), Max: 99.8 (01 Sep 2018 02:53)  HR: 94 (01 Sep 2018 04:07) (87 - 120)  BP: 121/61 (01 Sep 2018 03:54) (106/58 - 195/83)  BP(mean): --  RR: 29 (01 Sep 2018 03:54) (29 - 48)  SpO2: 100% (01 Sep 2018 04:07) (85% - 100%)    Pt, hypoxic to 85%, placed on nonrebreather, tachy to 120s, profuse b/l wheeze. CCU CONSULTED however did not feel Pt presentation was not consistent with heart failure. Pt was placed on BiPAP for increased work of breathing, however remained tachypneic to 38. Pt reported improvement in symptoms however developed dry cough and chest pain. Pt was given 80 mg lasix, 04. sl nitro x2, aspirin 325mg, azithromycin, ceftriaxone, hydralazine 10mg.                14.2                 139  | 17   | 28           21.76 >-----------< 354     ------------------------< 336                   45.3                 3.6  | 99   | 1.66                                         Ca 9.2   Mg 2.1   Ph 5.7      03:10 - VBG - pH: 7.31  | pCO2: 44    | pO2: 55    | Lactate: 2.8    02:00 - VBG - pH: 7.13  | pCO2: 59    | pO2: 56    | Lactate: 5.6    HS troponin 27 lactate 5.6    POC U/S showed diffuse b lines, no pleural effusions b/l. changes consistent with atelectasis. Heart showed severe LV systolic dysfunction w/ poor squeeze.     < from: Xray Chest 1 View-PORTABLE IMMEDIATE (09.01.18 @ 02:18) >  Preliminary INTERPRETATION:  Favorpulmonary edema over multifocal pneumonia    < end of copied text >    Of note, Pt last hospitalized in February 2018:

## 2018-09-01 NOTE — CHART NOTE - NSCHARTNOTEFT_GEN_A_CORE
HPI:  This is a 72yoF w/ CAD s/p stents, HFrEF (36%), HTN, CKD, HLD, DMII, CAD s/p CABG p/w acute shortness of breath x 1 day.  In ED, placed on bipap for increased work of breathing.  Initial CXR concerning for pulmonary edema, patient was given lasix.  Also hypertensive 's, given hydralazine IVP.  Upon exam, patient states she is feeling better on bipap.  CCU consult called for HF exacerbation.  Labs significant for leukocytosis 21.76, lactate 5.6.  Tmax 99.8F.    PAST MEDICAL & SURGICAL HISTORY:  GERD (gastroesophageal reflux disease)  CAD (coronary artery disease): s/p CABG  Hypothyroidism  Hyperlipidemia  Diabetes mellitus, type 2  S/P CABG (coronary artery bypass graft)    FAMILY HISTORY:  Family history of hypertension (Sibling): sister  Family history of diabetes mellitus (Sibling): brothers/sisters  azithromycin  IVPB      azithromycin  IVPB 500 milliGRAM(s) IV Intermittent once  cefTRIAXone   IVPB 1 Gram(s) IV Intermittent Once    No Known Allergies    Objective:  T(C): 37.7 (09-01-18 @ 02:53), Max: 37.7 (09-01-18 @ 02:53)  HR: 101 (09-01-18 @ 02:53) (101 - 120)  BP: 106/58 (09-01-18 @ 02:53) (106/58 - 195/83)  RR: 38 (09-01-18 @ 02:53) (30 - 48)  SpO2: 97% (09-01-18 @ 02:53) (85% - 97%)  Wt(kg): --  CM:   General: Awake, alert, no acute distress.  Neuro: A&OX3  Chest: CTA, S1, S2, no murmur, RRR  Abdomen: distended  Groin: No bleeding, no hematoma  Ext: + distal pulses    EKG: Sinus tachycardia; no ischemic changes     Labs:                         14.2   21.76 )-----------( 354      ( 01 Sep 2018 02:00 )             45.3     09-01    139  |  99  |  28<H>  ----------------------------<  336<H>  3.6   |  17<L>  |  1.66<H>    Ca    9.2      01 Sep 2018 02:00  Phos  5.7     09-01  Mg     2.1     09-01    TPro  8.5<H>  /  Alb  4.3  /  TBili  0.4  /  DBili  x   /  AST  29  /  ALT  20  /  AlkPhos  82  09-01    PT/INR - ( 01 Sep 2018 02:00 )   PT: 10.5 SEC;   INR: 0.91          PTT - ( 01 Sep 2018 02:00 )  PTT:38.9 SEC  Cardiac Enzymes  Creatine Kinase, Serum: 193 u/L <H> [25 - 170] (09-01 @ 02:00)  CKMB: 4.51 ng/mL [1 - 4.7] (09-01 @ 02:00)    Recommendation:  Patient's presenting symptoms likely secondary to infectious process given leukocytosis and elevated lactate  Not a candidate for CCU admission at this time  Consider MICU consult  F/u patient's cardiologist Colleen BORJAS for further cardiology management

## 2018-09-01 NOTE — H&P ADULT - NSHPPHYSICALEXAM_GEN_ALL_CORE
T(C): 37.8 (09-01-18 @ 04:55), Max: 37.8 (09-01-18 @ 04:55)  HR: 81 (09-01-18 @ 06:00) (81 - 120)  BP: 101/59 (09-01-18 @ 06:00) (101/59 - 195/83)  RR: 30 (09-01-18 @ 06:00) (27 - 48)  SpO2: 97% (09-01-18 @ 06:00) (85% - 100%)    GENERAL APPEARANCE: LAying uncomfortablely in bed; labored breathing.   HEENT:  PERRL, EOMI.   NECK: Neck supple, non-tender without lymphadenopathy  CARDIAC: Normal S1 and S2. No S3, S4 or murmurs. Rhythm is regular.  LUNGS: Bibasilar crackles, no wheeze.   ABDOMEN: Positive bowel sounds. Soft, nondistended, nontender. No guarding or rebound.   MUSCULOSKELETAL:No joint erythema or tenderness.   EXTREMITIES: No significant deformity or joint abnormality. No edema. Peripheral pulses intact.   NEUROLOGICAL: CN II-XII grossly intact. non focal  SKIN: Skin normal color, texture and turgor with no lesions or eruptions.  PSYCHIATRIC: AOx3.Normal affect and behavior.

## 2018-09-01 NOTE — ED ADULT TRIAGE NOTE - CHIEF COMPLAINT QUOTE
Pt brought in by EMS for respiratory distress: Pt arrives on non rebreather 85% 02 saturation, tachypneic , shallow respirations and labored breathing. Charge RN notified: Pt brought back directly to trauma room B.

## 2018-09-01 NOTE — H&P ADULT - NSHPLABSRESULTS_GEN_ALL_CORE
14.2   21.76 )-----------( 354      ( 01 Sep 2018 02:00 )             45.3     Auto Eosinophil # 0.58  / Auto Eosinophil % 2.7   / Auto Neutrophil # 9.64  / Auto Neutrophil % 44.3  / BANDS % x        09-01    139  |  99  |  28<H>  ----------------------------<  336<H>  3.6   |  17<L>  |  1.66<H>    Ca    9.2      01 Sep 2018 02:00  Mg     2.1     09-01  Phos  5.7     09-01  TPro  8.5<H>  /  Alb  4.3  /  TBili  0.4  /  DBili  x   /  AST  29  /  ALT  20  /  AlkPhos  82  09-01    PT/INR - ( 01 Sep 2018 02:00 )   PT: 10.5 SEC;   INR: 0.91          PTT - ( 01 Sep 2018 02:00 )  PTT:38.9 SEC  CARDIAC MARKERS ( 01 Sep 2018 02:00 )  x     / x     / 193 u/L / 4.51 ng/mL / x          03:10 - VBG - pH: 7.31  | pCO2: 44    | pO2: 55    | Lactate: 2.8    02:00 - VBG - pH: 7.13  | pCO2: 59    | pO2: 56    | Lactate: 5.6    < from: Xray Chest 1 View-PORTABLE IMMEDIATE (09.01.18 @ 02:18) >    ******PRELIMINARY REPORT******            INTERPRETATION:  Favorpulmonary edema over multifocal pneumonia.    < end of copied text > 14.2   21.76 )-----------( 354      ( 01 Sep 2018 02:00 )             45.3     Auto Eosinophil # 0.58  / Auto Eosinophil % 2.7   / Auto Neutrophil # 9.64  / Auto Neutrophil % 44.3  / BANDS % x        09-01    139  |  99  |  28<H>  ----------------------------<  336<H>  3.6   |  17<L>  |  1.66<H>    Ca    9.2      01 Sep 2018 02:00  Mg     2.1     09-01  Phos  5.7     09-01  TPro  8.5<H>  /  Alb  4.3  /  TBili  0.4  /  DBili  x   /  AST  29  /  ALT  20  /  AlkPhos  82  09-01    PT/INR - ( 01 Sep 2018 02:00 )   PT: 10.5 SEC;   INR: 0.91         PTT - ( 01 Sep 2018 02:00 )  PTT:38.9 SEC  CARDIAC MARKERS ( 01 Sep 2018 02:00 )  x     / x     / 193 u/L / 4.51 ng/mL / x        Serum Pro-Brain Natriuretic Peptide (09.01.18 @ 03:10)    Serum Pro-Brain Natriuretic Peptide: 1783     03:10 - VBG - pH: 7.31  | pCO2: 44    | pO2: 55    | Lactate: 2.8    02:00 - VBG - pH: 7.13  | pCO2: 59    | pO2: 56    | Lactate: 5.6    < from: Xray Chest 1 View-PORTABLE IMMEDIATE (09.01.18 @ 02:18) >    ******PRELIMINARY REPORT******            INTERPRETATION:  Favorpulmonary edema over multifocal pneumonia.    < end of copied text >

## 2018-09-02 LAB
ALBUMIN SERPL ELPH-MCNC: 3.4 G/DL — SIGNIFICANT CHANGE UP (ref 3.3–5)
ALP SERPL-CCNC: 49 U/L — SIGNIFICANT CHANGE UP (ref 40–120)
ALT FLD-CCNC: 19 U/L — SIGNIFICANT CHANGE UP (ref 4–33)
AST SERPL-CCNC: 67 U/L — HIGH (ref 4–32)
BASOPHILS # BLD AUTO: 0.03 K/UL — SIGNIFICANT CHANGE UP (ref 0–0.2)
BASOPHILS # BLD AUTO: 0.04 K/UL — SIGNIFICANT CHANGE UP (ref 0–0.2)
BASOPHILS NFR BLD AUTO: 0.3 % — SIGNIFICANT CHANGE UP (ref 0–2)
BASOPHILS NFR BLD AUTO: 0.3 % — SIGNIFICANT CHANGE UP (ref 0–2)
BILIRUB SERPL-MCNC: 0.4 MG/DL — SIGNIFICANT CHANGE UP (ref 0.2–1.2)
BUN SERPL-MCNC: 31 MG/DL — HIGH (ref 7–23)
BUN SERPL-MCNC: 32 MG/DL — HIGH (ref 7–23)
CALCIUM SERPL-MCNC: 8.7 MG/DL — SIGNIFICANT CHANGE UP (ref 8.4–10.5)
CALCIUM SERPL-MCNC: 8.8 MG/DL — SIGNIFICANT CHANGE UP (ref 8.4–10.5)
CHLORIDE SERPL-SCNC: 103 MMOL/L — SIGNIFICANT CHANGE UP (ref 98–107)
CHLORIDE SERPL-SCNC: 103 MMOL/L — SIGNIFICANT CHANGE UP (ref 98–107)
CO2 SERPL-SCNC: 21 MMOL/L — LOW (ref 22–31)
CO2 SERPL-SCNC: 21 MMOL/L — LOW (ref 22–31)
CREAT SERPL-MCNC: 1.49 MG/DL — HIGH (ref 0.5–1.3)
CREAT SERPL-MCNC: 1.6 MG/DL — HIGH (ref 0.5–1.3)
EOSINOPHIL # BLD AUTO: 0.29 K/UL — SIGNIFICANT CHANGE UP (ref 0–0.5)
EOSINOPHIL # BLD AUTO: 0.31 K/UL — SIGNIFICANT CHANGE UP (ref 0–0.5)
EOSINOPHIL NFR BLD AUTO: 2.4 % — SIGNIFICANT CHANGE UP (ref 0–6)
EOSINOPHIL NFR BLD AUTO: 2.8 % — SIGNIFICANT CHANGE UP (ref 0–6)
GLUCOSE SERPL-MCNC: 147 MG/DL — HIGH (ref 70–99)
GLUCOSE SERPL-MCNC: 170 MG/DL — HIGH (ref 70–99)
HBA1C BLD-MCNC: 7.4 % — HIGH (ref 4–5.6)
HCT VFR BLD CALC: 33.5 % — LOW (ref 34.5–45)
HCT VFR BLD CALC: 34.4 % — LOW (ref 34.5–45)
HGB BLD-MCNC: 11.1 G/DL — LOW (ref 11.5–15.5)
HGB BLD-MCNC: 11.5 G/DL — SIGNIFICANT CHANGE UP (ref 11.5–15.5)
IMM GRANULOCYTES # BLD AUTO: 0.07 # — SIGNIFICANT CHANGE UP
IMM GRANULOCYTES # BLD AUTO: 0.08 # — SIGNIFICANT CHANGE UP
IMM GRANULOCYTES NFR BLD AUTO: 0.6 % — SIGNIFICANT CHANGE UP (ref 0–1.5)
IMM GRANULOCYTES NFR BLD AUTO: 0.7 % — SIGNIFICANT CHANGE UP (ref 0–1.5)
LYMPHOCYTES # BLD AUTO: 2.84 K/UL — SIGNIFICANT CHANGE UP (ref 1–3.3)
LYMPHOCYTES # BLD AUTO: 25.3 % — SIGNIFICANT CHANGE UP (ref 13–44)
LYMPHOCYTES # BLD AUTO: 26.4 % — SIGNIFICANT CHANGE UP (ref 13–44)
LYMPHOCYTES # BLD AUTO: 3.17 K/UL — SIGNIFICANT CHANGE UP (ref 1–3.3)
MAGNESIUM SERPL-MCNC: 2 MG/DL — SIGNIFICANT CHANGE UP (ref 1.6–2.6)
MAGNESIUM SERPL-MCNC: 2.1 MG/DL — SIGNIFICANT CHANGE UP (ref 1.6–2.6)
MCHC RBC-ENTMCNC: 29.8 PG — SIGNIFICANT CHANGE UP (ref 27–34)
MCHC RBC-ENTMCNC: 30.3 PG — SIGNIFICANT CHANGE UP (ref 27–34)
MCHC RBC-ENTMCNC: 33.1 % — SIGNIFICANT CHANGE UP (ref 32–36)
MCHC RBC-ENTMCNC: 33.4 % — SIGNIFICANT CHANGE UP (ref 32–36)
MCV RBC AUTO: 89.8 FL — SIGNIFICANT CHANGE UP (ref 80–100)
MCV RBC AUTO: 90.8 FL — SIGNIFICANT CHANGE UP (ref 80–100)
MONOCYTES # BLD AUTO: 0.96 K/UL — HIGH (ref 0–0.9)
MONOCYTES # BLD AUTO: 1.05 K/UL — HIGH (ref 0–0.9)
MONOCYTES NFR BLD AUTO: 8.6 % — SIGNIFICANT CHANGE UP (ref 2–14)
MONOCYTES NFR BLD AUTO: 8.7 % — SIGNIFICANT CHANGE UP (ref 2–14)
NEUTROPHILS # BLD AUTO: 6.99 K/UL — SIGNIFICANT CHANGE UP (ref 1.8–7.4)
NEUTROPHILS # BLD AUTO: 7.4 K/UL — SIGNIFICANT CHANGE UP (ref 1.8–7.4)
NEUTROPHILS NFR BLD AUTO: 61.6 % — SIGNIFICANT CHANGE UP (ref 43–77)
NEUTROPHILS NFR BLD AUTO: 62.3 % — SIGNIFICANT CHANGE UP (ref 43–77)
NRBC # FLD: 0 — SIGNIFICANT CHANGE UP
NRBC # FLD: 0 — SIGNIFICANT CHANGE UP
PHOSPHATE SERPL-MCNC: 2.4 MG/DL — LOW (ref 2.5–4.5)
PHOSPHATE SERPL-MCNC: 2.6 MG/DL — SIGNIFICANT CHANGE UP (ref 2.5–4.5)
PLATELET # BLD AUTO: 261 K/UL — SIGNIFICANT CHANGE UP (ref 150–400)
PLATELET # BLD AUTO: 273 K/UL — SIGNIFICANT CHANGE UP (ref 150–400)
PMV BLD: 9.3 FL — SIGNIFICANT CHANGE UP (ref 7–13)
PMV BLD: 9.8 FL — SIGNIFICANT CHANGE UP (ref 7–13)
POTASSIUM SERPL-MCNC: 3.5 MMOL/L — SIGNIFICANT CHANGE UP (ref 3.5–5.3)
POTASSIUM SERPL-MCNC: 5 MMOL/L — SIGNIFICANT CHANGE UP (ref 3.5–5.3)
POTASSIUM SERPL-SCNC: 3.5 MMOL/L — SIGNIFICANT CHANGE UP (ref 3.5–5.3)
POTASSIUM SERPL-SCNC: 5 MMOL/L — SIGNIFICANT CHANGE UP (ref 3.5–5.3)
PROT SERPL-MCNC: 7.1 G/DL — SIGNIFICANT CHANGE UP (ref 6–8.3)
RBC # BLD: 3.73 M/UL — LOW (ref 3.8–5.2)
RBC # BLD: 3.79 M/UL — LOW (ref 3.8–5.2)
RBC # FLD: 14.4 % — SIGNIFICANT CHANGE UP (ref 10.3–14.5)
RBC # FLD: 14.5 % — SIGNIFICANT CHANGE UP (ref 10.3–14.5)
SODIUM SERPL-SCNC: 140 MMOL/L — SIGNIFICANT CHANGE UP (ref 135–145)
SODIUM SERPL-SCNC: 141 MMOL/L — SIGNIFICANT CHANGE UP (ref 135–145)
SPECIMEN SOURCE: SIGNIFICANT CHANGE UP
SPECIMEN SOURCE: SIGNIFICANT CHANGE UP
WBC # BLD: 11.21 K/UL — HIGH (ref 3.8–10.5)
WBC # BLD: 12.02 K/UL — HIGH (ref 3.8–10.5)
WBC # FLD AUTO: 11.21 K/UL — HIGH (ref 3.8–10.5)
WBC # FLD AUTO: 12.02 K/UL — HIGH (ref 3.8–10.5)

## 2018-09-02 PROCEDURE — 99223 1ST HOSP IP/OBS HIGH 75: CPT | Mod: GC

## 2018-09-02 RX ORDER — POTASSIUM CHLORIDE 20 MEQ
40 PACKET (EA) ORAL EVERY 4 HOURS
Qty: 0 | Refills: 0 | Status: COMPLETED | OUTPATIENT
Start: 2018-09-02 | End: 2018-09-02

## 2018-09-02 RX ORDER — CEFTRIAXONE 500 MG/1
1 INJECTION, POWDER, FOR SOLUTION INTRAMUSCULAR; INTRAVENOUS EVERY 24 HOURS
Qty: 0 | Refills: 0 | Status: COMPLETED | OUTPATIENT
Start: 2018-09-02 | End: 2018-09-05

## 2018-09-02 RX ORDER — ATORVASTATIN CALCIUM 80 MG/1
40 TABLET, FILM COATED ORAL AT BEDTIME
Qty: 0 | Refills: 0 | Status: DISCONTINUED | OUTPATIENT
Start: 2018-09-02 | End: 2018-09-10

## 2018-09-02 RX ORDER — LEVOTHYROXINE SODIUM 125 MCG
50 TABLET ORAL DAILY
Qty: 0 | Refills: 0 | Status: DISCONTINUED | OUTPATIENT
Start: 2018-09-02 | End: 2018-09-10

## 2018-09-02 RX ORDER — AZITHROMYCIN 500 MG/1
500 TABLET, FILM COATED ORAL EVERY 24 HOURS
Qty: 0 | Refills: 0 | Status: COMPLETED | OUTPATIENT
Start: 2018-09-03 | End: 2018-09-05

## 2018-09-02 RX ORDER — ASPIRIN/CALCIUM CARB/MAGNESIUM 324 MG
81 TABLET ORAL DAILY
Qty: 0 | Refills: 0 | Status: DISCONTINUED | OUTPATIENT
Start: 2018-09-02 | End: 2018-09-10

## 2018-09-02 RX ORDER — TICAGRELOR 90 MG/1
90 TABLET ORAL
Qty: 0 | Refills: 0 | Status: DISCONTINUED | OUTPATIENT
Start: 2018-09-02 | End: 2018-09-10

## 2018-09-02 RX ORDER — FUROSEMIDE 40 MG
40 TABLET ORAL DAILY
Qty: 0 | Refills: 0 | Status: DISCONTINUED | OUTPATIENT
Start: 2018-09-03 | End: 2018-09-03

## 2018-09-02 RX ORDER — SODIUM,POTASSIUM PHOSPHATES 278-250MG
1 POWDER IN PACKET (EA) ORAL
Qty: 0 | Refills: 0 | Status: COMPLETED | OUTPATIENT
Start: 2018-09-02 | End: 2018-09-03

## 2018-09-02 RX ORDER — METOPROLOL TARTRATE 50 MG
12.5 TABLET ORAL
Qty: 0 | Refills: 0 | Status: DISCONTINUED | OUTPATIENT
Start: 2018-09-02 | End: 2018-09-10

## 2018-09-02 RX ADMIN — HEPARIN SODIUM 5000 UNIT(S): 5000 INJECTION INTRAVENOUS; SUBCUTANEOUS at 05:51

## 2018-09-02 RX ADMIN — Medication 1 TABLET(S): at 12:10

## 2018-09-02 RX ADMIN — Medication 81 MILLIGRAM(S): at 12:10

## 2018-09-02 RX ADMIN — Medication 1 TABLET(S): at 08:22

## 2018-09-02 RX ADMIN — INSULIN GLARGINE 60 UNIT(S): 100 INJECTION, SOLUTION SUBCUTANEOUS at 21:22

## 2018-09-02 RX ADMIN — Medication 12.5 MILLIGRAM(S): at 14:42

## 2018-09-02 RX ADMIN — TICAGRELOR 90 MILLIGRAM(S): 90 TABLET ORAL at 10:42

## 2018-09-02 RX ADMIN — HEPARIN SODIUM 5000 UNIT(S): 5000 INJECTION INTRAVENOUS; SUBCUTANEOUS at 21:47

## 2018-09-02 RX ADMIN — INSULIN GLARGINE 20 UNIT(S): 100 INJECTION, SOLUTION SUBCUTANEOUS at 12:00

## 2018-09-02 RX ADMIN — Medication 2: at 17:43

## 2018-09-02 RX ADMIN — Medication 8: at 12:10

## 2018-09-02 RX ADMIN — AZITHROMYCIN 250 MILLIGRAM(S): 500 TABLET, FILM COATED ORAL at 01:03

## 2018-09-02 RX ADMIN — Medication 1 TABLET(S): at 21:47

## 2018-09-02 RX ADMIN — HEPARIN SODIUM 5000 UNIT(S): 5000 INJECTION INTRAVENOUS; SUBCUTANEOUS at 14:42

## 2018-09-02 RX ADMIN — Medication 1 TABLET(S): at 17:43

## 2018-09-02 RX ADMIN — TICAGRELOR 90 MILLIGRAM(S): 90 TABLET ORAL at 21:47

## 2018-09-02 RX ADMIN — Medication 40 MILLIEQUIVALENT(S): at 06:20

## 2018-09-02 RX ADMIN — Medication 40 MILLIEQUIVALENT(S): at 10:42

## 2018-09-02 RX ADMIN — Medication 30 MILLILITER(S): at 17:42

## 2018-09-02 RX ADMIN — ATORVASTATIN CALCIUM 40 MILLIGRAM(S): 80 TABLET, FILM COATED ORAL at 21:47

## 2018-09-02 RX ADMIN — Medication 50 MICROGRAM(S): at 12:00

## 2018-09-02 RX ADMIN — CEFTRIAXONE 100 GRAM(S): 500 INJECTION, POWDER, FOR SOLUTION INTRAMUSCULAR; INTRAVENOUS at 03:00

## 2018-09-02 NOTE — PROGRESS NOTE ADULT - SUBJECTIVE AND OBJECTIVE BOX
Patient seen and examined at bedside. States minimal SOB. No chest pain    VITALS:  T(F): 96 (09-02-18 @ 12:00), Max: 98.3 (09-01-18 @ 16:00)  HR: 83 (09-02-18 @ 14:49)  BP: 119/57 (09-02-18 @ 14:00)  RR: 25 (09-02-18 @ 14:49)  SpO2: 99% (09-02-18 @ 14:49)  Wt(kg): --    09-01 @ 07:01  -  09-02 @ 07:00  --------------------------------------------------------  IN: 680 mL / OUT: 1950 mL / NET: -1270 mL    09-02 @ 07:01  -  09-02 @ 15:51  --------------------------------------------------------  IN: 0 mL / OUT: 215 mL / NET: -215 mL          PHYSICAL EXAM:  Constitutional: NAD  Neck: No JVD  Respiratory: (+) right basal crackles  Cardiovascular: S1, S2, RRR  Gastrointestinal: BS+, soft, NT/ND  Extremities: No peripheral edema    Hospital Medications:   MEDICATIONS  (STANDING):  aspirin  chewable 81 milliGRAM(s) Oral daily  atorvastatin 40 milliGRAM(s) Oral at bedtime  cefTRIAXone   IVPB 1 Gram(s) IV Intermittent every 24 hours  heparin  Injectable 5000 Unit(s) SubCutaneous every 8 hours  insulin glargine Injectable (LANTUS) 20 Unit(s) SubCutaneous every morning  insulin glargine Injectable (LANTUS) 60 Unit(s) SubCutaneous at bedtime  insulin lispro (HumaLOG) corrective regimen sliding scale   SubCutaneous three times a day with meals  levothyroxine 50 MICROGram(s) Oral daily  metoprolol tartrate 12.5 milliGRAM(s) Oral two times a day  potassium acid phosphate/sodium acid phosphate tablet (K-PHOS No. 2) 1 Tablet(s) Oral four times a day with meals  ticagrelor 90 milliGRAM(s) Oral two times a day      LABS:  09-02    141  |  103  |  31<H>  ----------------------------<  170<H>  3.5   |  21<L>  |  1.60<H>    Ca    8.7      02 Sep 2018 03:45  Phos  2.4     09-02  Mg     2.0     09-02    TPro  7.1  /  Alb  3.4  /  TBili  0.4  /  DBili      /  AST  67<H>  /  ALT  19  /  AlkPhos  49  09-02    Creatinine Trend: 1.60 <--, 1.49 <--, 1.67 <--, 1.66 <--  Phosphorus Level, Serum: 2.4 mg/dL (09-02 @ 03:45)  Phosphorus Level, Serum: 2.6 mg/dL (09-02 @ 02:30)  Albumin, Serum: 3.4 g/dL (09-02 @ 02:30)                              11.5   11.21 )-----------( 261      ( 02 Sep 2018 03:45 )             34.4     Urine Studies:      Urinalysis - [09-01-18 @ 04:29]      Color YELLOW / Appearance CLEAR / SG 1.015 / pH 6.5      Gluc 250 / Ketone NEGATIVE  / Bili NEGATIVE / Urobili NORMAL       Blood MODERATE / Protein MODERATE / Leuk Est NEGATIVE / Nitrite NEGATIVE      RBC 5-10 / WBC 2-5 / Hyaline  / Gran  / Sq Epi OCC. / Non Sq Epi  / Bacteria       Iron 40, TIBC 377, %sat --      [01-20-18 @ 06:06]  Ferritin 38.35      [01-20-18 @ 06:06]  HbA1c 7.4      [09-02-18 @ 02:30]  TSH 0.55      [01-19-18 @ 06:45]  Lipid: chol 130, , HDL 30, LDL 70      [01-19-18 @ 06:45]        RADIOLOGY & ADDITIONAL STUDIES:

## 2018-09-02 NOTE — PROGRESS NOTE ADULT - ASSESSMENT
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 Patient was hypoxic got admitted to MICU being treated for CHF and pneumonia

## 2018-09-02 NOTE — CHART NOTE - [id number]_GEN_A_CORE
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     In the ED, Pt was hypoxic to 85%, placed on nonrebreather, tachy to 120s, profuse b/l wheeze. CCU CONSULTED however did not feel Pt presentation was not consistent with heart failure. Pt was placed on BiPAP for increased work of breathing, however remained tachypneic to 38. Pt reported improvement in symptoms however developed dry cough and chest pain. Pt was given 80 mg lasix x1, followed by 40mg lasix x1, sl nitro x2, aspirin 325mg, azithromycin, ceftriaxone, hydralazine 10mg. CXR showed diffuse pulmonary edema bilaterally. POCUS showed bilat B lines throughout with smooth pleura (somewhat rough pleura L ant chest; no Pleff; no consolidation; R base small area of static air bronchograms c/w atelectatic lung; Cor severely reduced LVF; IVC appears small and collapses with inspiration     In the MICU, Pt was weaned off BiPAP and placed on nasal cannula. Follow up POC ultrasound showed evidence of basilar consolidations. Pt continued on azithromycin and ceftriaxone for PNA.     Pt respiratory status improved and deemed stable to be transferred to the floor.          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       #Neuro  - No active issues     #Skin  - No active issues    # Resp  - Acute onset SOB likely 2/2 acute on chronic HF exacerbation. CXR c/w pulmonary edema.  - POCUS w/ bilateral B- lines and R base w/ static air bronchograms c/w atelectatic lung.   - Pt symptoms improved, now on nasal cannula      # Cardio  - Hx of CAD s/p stent (2017) and CABG and HFrEF (36%)  - EKG showed no evidence of ischemia or acute infarct; HsTrops uptrended to 55 will repeat and evaluate. EKG shows no evidence of ST elevations, depressions or T wave inversions  - Will c/w home Toprol-XL 12.5mg, ticagrelor 90mg, ASA 81mg, Atorvastatin 40mg  when Pt can tolerate PO.  - Will follow I/Os     #GI  - Hx of GERD  - Will continue her on home ranitidine when Pt can tolerate PO.    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; Serum Cr appear within baseline range (1.6 -1.7)  - Renally dose all medications    # Metabolic  - Metabolic acidosis w/ AG 23 likely 2/2 lactic acidosis: Lactate improved to 2.1  - Concomitant respiratory alkalosis     #ID  - Cannot rule out basilar PNA will c/w Ceftriaxone and Azithromycin  - RVP negative    #Prophylaxis  - HSQ        For Followup:          Vital Signs Last 24 Hrs  T(C): 36.3 (02 Sep 2018 00:00), Max: 37.8 (01 Sep 2018 04:55)  T(F): 97.4 (02 Sep 2018 00:00), Max: 100.1 (01 Sep 2018 04:55)  HR: 78 (02 Sep 2018 00:00) (78 - 120)  BP: 108/57 (02 Sep 2018 00:00) (101/59 - 195/83)  BP(mean): 69 (02 Sep 2018 00:00) (62 - 85)  RR: 24 (02 Sep 2018 00:00) (20 - 48)  SpO2: 96% (02 Sep 2018 00:00) (85% - 100%)  I&O's Summary    31 Aug 2018 07:01  -  01 Sep 2018 07:00  --------------------------------------------------------  IN: 0 mL / OUT: 220 mL / NET: -220 mL    01 Sep 2018 07:01  -  02 Sep 2018 00:26  --------------------------------------------------------  IN: 320 mL / OUT: 1635 mL / NET: -1315 mL        MEDICATIONS  (STANDING):  azithromycin  IVPB      azithromycin  IVPB 500 milliGRAM(s) IV Intermittent every 24 hours  cefTRIAXone   IVPB 1 Gram(s) IV Intermittent every 24 hours  heparin  Injectable 5000 Unit(s) SubCutaneous every 8 hours  insulin glargine Injectable (LANTUS) 20 Unit(s) SubCutaneous every morning  insulin glargine Injectable (LANTUS) 60 Unit(s) SubCutaneous at bedtime  insulin lispro (HumaLOG) corrective regimen sliding scale   SubCutaneous three times a day with meals    MEDICATIONS  (PRN):        LABS                                            14.2                  Neurophils% (auto):   44.3   (09-01 @ 02:00):    21.76)-----------(354          Lymphocytes% (auto):  43.2                                          45.3                   Eosinphils% (auto):   2.7      Manual%: Neutrophils 46.5 ; Lymphocytes 29.0 ; Eosinophils 4.4  ; Bands%: 0    ; Blasts 0                                    140    |  102    |  31                  Calcium: 8.9   / iCa: x      (09-01 @ 09:30)    ----------------------------<  183       Magnesium: 1.9                              4.0     |  21     |  1.67             Phosphorous: 2.7      TPro  7.7    /  Alb  3.9    /  TBili  0.5    /  DBili  x      /  AST  22     /  ALT  16     /  AlkPhos  63     01 Sep 2018 09:30    ( 09-01 @ 02:00 )   PT: 10.5 SEC;   INR: 0.91   aPTT: 38.9 SEC MICU Transfer Note    Transfer from: MICU    Transfer to: (X) Medicine    (  ) Telemetry     (   ) RCU        (    ) Palliative         (   ) Stroke Unit          (   ) __________________    Accepting physician:      MICU COURSE:    71 yo Yoruba speaking F w/ CAD s/p stent (2017) and CABG (2014;3 vessel), HFrEF (36%), HTN, T2DM (a1c: 9.1 11/17), 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     In the ED, Pt was hypoxic to 85%, placed on nonrebreather, tachy to 120s, profuse b/l wheeze. CCU CONSULTED however did not feel Pt presentation was not consistent with heart failure. Pt was placed on BiPAP for increased work of breathing, however remained tachypneic to 38. Pt reported improvement in symptoms however developed dry cough and chest pain. Pt was given 80 mg lasix x1, followed by 40mg lasix x1, sl nitro x2, aspirin 325mg, azithromycin, ceftriaxone, hydralazine 10mg. EKG showed no evidence of ischemia or acute infarct; HsTrops uptrended to from 27 to 33. CXR showed diffuse pulmonary edema bilaterally. POCUS showed bilat B lines throughout with smooth pleura (somewhat rough pleura L ant chest; no Pleff; no consolidation; R base small area of static air bronchograms c/w atelectatic lung; Cor severely reduced LVF; IVC appears small and collapses with inspiration     In the MICU, Pt was weaned off BiPAP and placed on nasal cannula. Follow up POC ultrasound showed evidence of basilar consolidations. Pt continued on azithromycin and ceftriaxone for PNA. Repeat EKG showed no evidence of ST elevations, depressions or T wave inversions. Hstrops uptrended to 55 then fell to 52. RVP and legionella antigen was negative.     Pt respiratory status improved and deemed stable to be transferred to the floor.      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       For Followup:    [ ] Restart home Toprol-XL 12.5mg, ticagrelor 90mg, ASA 81mg, Atorvastatin 40mg, levothyroxine and ranitidine when Pt can tolerate PO.  [ ] C/w Lantus 20U/60U; takes 20U glargine in AM/ 68U glargine in PM at home  [ ] C/w ISS and FS q6hr  [ ] renally dose all medications; Cr baseline range (1.6 -1.7)  [ ] C/w Ceftriaxone and Azithromycin        ( 09-01 @ 02:00 )   PT: 10.5 SEC;   INR: 0.91   aPTT: 38.9 SEC MICU Transfer Note    Transfer from: MICU    Transfer to: (X) Medicine    (  ) Telemetry     (   ) RCU        (    ) Palliative         (   ) Stroke Unit          (   ) __________________    Accepting physician:      MICU COURSE:    73 yo Sinhala speaking F w/ CAD s/p stent (2017) and CABG (2014;3 vessel), HFrEF (36%), HTN, T2DM (a1c: 9.1 11/17), 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     In the ED, Pt was hypoxic to 85%, placed on nonrebreather, tachy to 120s, with profuse b/l wheeze. CCU CONSULTED however did not feel Pt presentation was not consistent with heart failure. Pt was placed on BiPAP for increased work of breathing, however remained tachypneic to 38. Pt reported improvement in symptoms however developed dry cough and chest pain. Pt was given 80 mg lasix x1, followed by 40mg lasix x1, sl nitro x2, aspirin 325mg, azithromycin, ceftriaxone, hydralazine 10mg. EKG showed no evidence of ischemia or acute infarct; HsTrops uptrended to from 27 to 33. CXR showed diffuse pulmonary edema bilaterally. POCUS showed bilat B lines throughout with smooth pleura (somewhat rough pleura L ant chest; no Pleff; no consolidation; R base small area of static air bronchograms c/w atelectatic lung; Cor severely reduced LVF; IVC appears small and collapses with inspiration.     In the MICU, Pt was weaned off BiPAP and placed on nasal cannula. Follow up POC ultrasound showed evidence of basilar consolidations. Pt continued on azithromycin and ceftriaxone for PNA. Repeat EKG showed no evidence of ST elevations, depressions or T wave inversions. Hstrops uptrended to 55 then fell to 52. RVP and legionella antigen was negative.     Pt respiratory status improved and deemed stable to be transferred to the floor.      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       For Followup:    [ ] Restart home Toprol-XL 12.5mg, ticagrelor 90mg, ASA 81mg, Atorvastatin 40mg, levothyroxine and ranitidine when Pt can tolerate PO.  [ ] C/w Lantus 20U/60U; takes 20U glargine in AM/ 68U glargine in PM at home  [ ] C/w ISS and FS q6hr  [ ] renally dose all medications; Cr baseline range (1.6 -1.7)  [ ] C/w Ceftriaxone and Azithromycin  [ ] Monitor for continued CP, currently asymptomatic however         ( 09-01 @ 02:00 )   PT: 10.5 SEC;   INR: 0.91   aPTT: 38.9 SEC MICU Transfer Note    Transfer from: MICU    Transfer to: (X) Medicine    (  ) Telemetry     (   ) RCU        (    ) Palliative         (   ) Stroke Unit          (   ) __________________    Accepting physician:      MICU COURSE:    71 yo Spanish speaking F w/ CAD s/p stent (2017) and CABG (2014;3 vessel), HFrEF (36%), HTN, T2DM (a1c: 9.1 11/17), 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 Bedside echo shows severely reduced LV function.     In the ED, Pt was hypoxic to 85%, placed on nonrebreather, tachy to 120s, with profuse b/l wheeze. CCU CONSULTED however did not feel Pt presentation was not consistent with heart failure. Pt was placed on BiPAP for increased work of breathing, however remained tachypneic to 38. Pt reported improvement in symptoms however developed dry cough and chest pain. Pt was given 80 mg lasix x1, followed by 40mg lasix x1, sl nitro x2, aspirin 325mg, azithromycin, ceftriaxone, hydralazine 10mg. EKG showed no evidence of ischemia or acute infarct; HsTrops uptrended to from 27 to 33. CXR showed diffuse pulmonary edema bilaterally. POCUS showed bilat B lines throughout with smooth pleura (somewhat rough pleura L ant chest; no Pleff; no consolidation; R base small area of static air bronchograms c/w atelectatic lung; Cor severely reduced LVF; IVC appears small and collapses with inspiration.     In the MICU, Pt was weaned off BiPAP and is saturating well on room air. Follow up POC ultrasound showed evidence of basilar consolidations. Pt continued on azithromycin and ceftriaxone for PNA. Repeat EKG showed no evidence of ST elevations, depressions or T wave inversions. Hstrops uptrended to 55 then fell to 52. RVP and legionella antigen was negative. Pt respiratory status improved and deemed stable to be transferred to the floor.  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       For Followup:    [  ] Pt will start 40 Lasix PO tomorrow, monitor BP and urine output   [ ] Continue Toprol-XL 12.5mg, ticagrelor 90mg, ASA 81mg, Atorvastatin 40mg, levothyroxine and ranitidine   [ ] C/w Lantus 20U/60U; takes 20U glargine in AM/ 68U glargine in PM at home  [ ] C/w ISS and FS q6hr  [ ] renally dose all medications; Cr baseline range (1.6 -1.7)  [ ] C/w Ceftriaxone and Azithromycin for 5 day course (This is Day 3)         ICU Vital Signs Last 24 Hrs  T(C): 35.6 (02 Sep 2018 12:00), Max: 36.8 (01 Sep 2018 16:00)  T(F): 96 (02 Sep 2018 12:00), Max: 98.3 (01 Sep 2018 16:00)  HR: 83 (02 Sep 2018 14:49) (77 - 90)  BP: 119/57 (02 Sep 2018 14:00) (100/56 - 136/70)  BP(mean): 73 (02 Sep 2018 14:00) (62 - 88)  ABP: --  ABP(mean): --  RR: 25 (02 Sep 2018 14:49) (20 - 37)  SpO2: 99% (02 Sep 2018 14:49) (94% - 100%)    PHYSICAL EXAM:  General: Laying in bed comfortably   HEENT: EOMI, nonicteric sclera   Respiratory: Lungs clear to auscultation bilaterally   Cardiovascular: S1, S2. No murmurs.   Abdomen: Soft, nontender, nondistended.   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      #Neuro  - No active issues     #Skin  - No active issues    # Resp  - Acute onset SOB likely 2/2 acute on chronic HF exacerbation. CXR c/w pulmonary edema.  - Pt weaned off BIPAP, saturating well on room air.   - Patient diuresing well on own, will start standing PO Lasix tomorrow      # Cardio  - Hx of CAD s/p stent (2017) and CABG and HFrEF (36%).  -  Bedside echo shows severely reduced LV function.  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; Serum Cr appear within baseline range (1.6 -1.7)  - Renally dose all medications    #ID  - Continue with Ceftriaxone and Azithromycin for 5 day course   - RVP negative    #Prophylaxis  - HSQ              ( 09-01 @ 02:00 )   PT: 10.5 SEC;   INR: 0.91   aPTT: 38.9 SEC MICU Transfer Note    Transfer from: MICU    Transfer to: (X) Medicine    (  ) Telemetry     (   ) RCU        (    ) Palliative         (   ) Stroke Unit          (   ) __________________    Accepting physician:      MICU COURSE:    73 yo Danish speaking F w/ CAD s/p stent (2017) and CABG (2014;3 vessel), HFrEF (36%), HTN, T2DM (a1c: 9.1 11/17), 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 Bedside echo shows severely reduced LV function.     In the ED, Pt was hypoxic to 85%, placed on nonrebreather, tachy to 120s, with profuse b/l wheeze. CCU CONSULTED however did not feel Pt presentation was not consistent with heart failure. Pt was placed on BiPAP for increased work of breathing, however remained tachypneic to 38. Pt reported improvement in symptoms however developed dry cough and chest pain. Pt was given 80 mg lasix x1, followed by 40mg lasix x1, sl nitro x2, aspirin 325mg, azithromycin, ceftriaxone, hydralazine 10mg. EKG showed no evidence of ischemia or acute infarct; HsTrops uptrended to from 27 to 33. CXR showed diffuse pulmonary edema bilaterally. POCUS showed bilat B lines throughout with smooth pleura (somewhat rough pleura L ant chest; no Pleff; no consolidation; R base small area of static air bronchograms c/w atelectatic lung; Cor severely reduced LVF; IVC appears small and collapses with inspiration.     In the MICU, Pt was weaned off BiPAP and is saturating well on room air. Follow up POC ultrasound showed evidence of basilar consolidations. Pt continued on azithromycin and ceftriaxone for PNA. Repeat EKG showed no evidence of ST elevations, depressions or T wave inversions. Hstrops uptrended to 55 then fell to 52. RVP and legionella antigen was negative. Pt respiratory status improved and deemed stable to be transferred to the floor.  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       For Followup:    [  ] Pt will start 40 Lasix PO tomorrow, monitor BP and urine output   [  ] Consider starting ACE inhibitor if BP allows (in the setting of severely reduced LV function).   [ ] Continue Toprol-XL 12.5mg, ticagrelor 90mg, ASA 81mg, Atorvastatin 40mg, levothyroxine and ranitidine   [  ] Consider formal TTE   [ ] C/w Lantus 20U/60U; takes 20U glargine in AM/ 68U glargine in PM at home  [ ] C/w ISS and FS q6hr  [ ] renally dose all medications; Cr baseline range (1.6 -1.7)  [ ] C/w Ceftriaxone and Azithromycin for 5 day course (This is Day 3)         ICU Vital Signs Last 24 Hrs  T(C): 35.6 (02 Sep 2018 12:00), Max: 36.8 (01 Sep 2018 16:00)  T(F): 96 (02 Sep 2018 12:00), Max: 98.3 (01 Sep 2018 16:00)  HR: 83 (02 Sep 2018 14:49) (77 - 90)  BP: 119/57 (02 Sep 2018 14:00) (100/56 - 136/70)  BP(mean): 73 (02 Sep 2018 14:00) (62 - 88)  ABP: --  ABP(mean): --  RR: 25 (02 Sep 2018 14:49) (20 - 37)  SpO2: 99% (02 Sep 2018 14:49) (94% - 100%)    PHYSICAL EXAM:  General: Laying in bed comfortably   HEENT: EOMI, nonicteric sclera   Respiratory: Lungs clear to auscultation bilaterally   Cardiovascular: S1, S2. No murmurs.   Abdomen: Soft, nontender, nondistended.   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      #Neuro  - No active issues     #Skin  - No active issues    # Resp  - Acute onset SOB likely 2/2 acute on chronic HF exacerbation. CXR c/w pulmonary edema.  - Pt weaned off BIPAP, saturating well on room air.   - Patient diuresing well on own, will start standing PO Lasix tomorrow      # Cardio  - Hx of CAD s/p stent (2017) and CABG and HFrEF (36%).  -  Bedside echo shows severely reduced LV function.  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; Serum Cr appear within baseline range (1.6 -1.7)  - Renally dose all medications    #ID  - Continue with Ceftriaxone and Azithromycin for 5 day course   - RVP negative    #Prophylaxis  - HSQ              ( 09-01 @ 02:00 )   PT: 10.5 SEC;   INR: 0.91   aPTT: 38.9 SEC MICU Transfer Note    Transfer from: MICU    Transfer to: (X) Medicine    (  ) Telemetry     (   ) RCU        (    ) Palliative         (   ) Stroke Unit          (   ) __________________    Accepting physician:      MICU COURSE:    73 yo Greenlandic speaking F w/ CAD s/p stent (2017) and CABG (2014;3 vessel), HFrEF (36%), HTN, T2DM (a1c: 9.1 11/17), 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 Bedside echo shows severely reduced LV function.     In the ED, Pt was hypoxic to 85%, placed on nonrebreather, tachy to 120s, with profuse b/l wheeze. CCU CONSULTED however did not feel Pt presentation was not consistent with heart failure. Pt was placed on BiPAP for increased work of breathing, however remained tachypneic to 38. Pt reported improvement in symptoms however developed dry cough and chest pain. Pt was given 80 mg lasix x1, followed by 40mg lasix x1, sl nitro x2, aspirin 325mg, azithromycin, ceftriaxone, hydralazine 10mg. EKG showed no evidence of ischemia or acute infarct; HsTrops uptrended to from 27 to 33. CXR showed diffuse pulmonary edema bilaterally. POCUS showed bilat B lines throughout with smooth pleura (somewhat rough pleura L ant chest; no Pleff; no consolidation; R base small area of static air bronchograms c/w atelectatic lung; Cor severely reduced LVF; IVC appears small and collapses with inspiration.     In the MICU, Pt was weaned off BiPAP and is saturating well on room air. Follow up POC ultrasound showed evidence of basilar consolidations. Pt continued on azithromycin and ceftriaxone for PNA. Repeat EKG showed no evidence of ST elevations, depressions or T wave inversions. Hstrops uptrended to 55 then fell to 52. RVP and legionella antigen was negative. Pt respiratory status improved and deemed stable to be transferred to the floor.  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       For Followup:    [  ] Pt will start 40 Lasix PO tomorrow, monitor BP and urine output   [  ] Consider starting ACE inhibitor if BP allows (in the setting of severely reduced LV function).   [ ] Continue Toprol-XL 12.5mg, ticagrelor 90mg, ASA 81mg, Atorvastatin 40mg, levothyroxine and ranitidine   [ ] C/w Ceftriaxone and Azithromycin for 5 day course (This is Day 3)   [  ] Consider formal TTE   [ ] C/w Lantus 20U/60U; takes 20U glargine in AM/ 68U glargine in PM at home  [ ] C/w ISS and FS q6hr  [ ] renally dose all medications; Cr baseline range (1.6 -1.7)          ICU Vital Signs Last 24 Hrs  T(C): 35.6 (02 Sep 2018 12:00), Max: 36.8 (01 Sep 2018 16:00)  T(F): 96 (02 Sep 2018 12:00), Max: 98.3 (01 Sep 2018 16:00)  HR: 83 (02 Sep 2018 14:49) (77 - 90)  BP: 119/57 (02 Sep 2018 14:00) (100/56 - 136/70)  BP(mean): 73 (02 Sep 2018 14:00) (62 - 88)  ABP: --  ABP(mean): --  RR: 25 (02 Sep 2018 14:49) (20 - 37)  SpO2: 99% (02 Sep 2018 14:49) (94% - 100%)    PHYSICAL EXAM:  General: Laying in bed comfortably   HEENT: EOMI, nonicteric sclera   Respiratory: Lungs clear to auscultation bilaterally   Cardiovascular: S1, S2. No murmurs.   Abdomen: Soft, nontender, nondistended.   Extremities: No lower extremity edema   Neuro: No focal deficits     Assessment and Plan:   73 yo Greenlandic speaking F w/ CAD s/p stent (2017) and CABG (2014;3 vessel), HFrEF (36%), HTN, T2DM, CKD 2/2 diabetic nephropathy, HLD, hypothyroidism, GERD, w/ acute SOB 2/2 to cardiogenic pulmonary edema; initially on BIPAP, now weaned off to room air and saturating well.      #Neuro  - No active issues     #Skin  - No active issues    # Resp  - Acute onset SOB likely 2/2 acute on chronic HF exacerbation. CXR c/w pulmonary edema.  - Pt weaned off BIPAP, saturating well on room air.   - Patient diuresing well on own, will start standing PO Lasix tomorrow      # Cardio  - Hx of CAD s/p stent (2017) and CABG and HFrEF (36%).  -  Bedside echo shows severely reduced LV function.  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

## 2018-09-02 NOTE — PROGRESS NOTE ADULT - PROBLEM SELECTOR PLAN 1
At baseline. Baseline SCr 1.5-1.8  Renal function fluctuates sec to CHF  Monitor renal function and electrolyte.

## 2018-09-02 NOTE — PROGRESS NOTE ADULT - PROBLEM SELECTOR PLAN 1
inches. GENERAL:  Awake, alert, oriented. Somewhat difficult speech. Severely  malnourished, but in no acute painful distress. HEENT:  Head normocephalic, atraumatic. Pupils were equal and round. NECK:  No adenopathy, no bruits. Range of motion somewhat limited, but  trachea is in the midline. CARDIOVASCULAR:  Regular rate and rhythm, sinus tach. No murmurs, no  gallops. LUNGS:  Clear to auscultation bilaterally. Decreased bibasilar. No  evidence of rales or rhonchi. ABDOMEN:  Bowel sounds are present. Soft, nondistended. No masses, no  organomegaly. EXTREMITIES:  No clubbing, cyanosis, or edema. SKIN:  Warm and dry. Capillary refill slightly prolonged. NEUROLOGIC:  Cranial nerves II through XII grossly intact without focal  deficit. LABORATORY VALUE:  Hemoglobin is 10.9, WBC count is 7.5, and platelet count  is unavailable. Sodium 127, potassium is 4.9, CO2 is 19, creatinine 1.0,  BUN is 34. IMPRESSION:  Dysphagia, malnutrition, hyponatremia. PLAN:  I have discussed the plan with the patient's family and with the  patient. I have encouraged them to continue eating with assistance. I  have also recommended the patient have an EGD and possible PEG. Though  this is Sunday, 09/02/2018, and holiday tomorrow, the endoscopy suite will  not be open until 09/04/2018. The patient is stable at this time. She is  able to be transferred back to the extended care facility and brought back  on Tuesday, or if the patient's family and physician wish to keep her in  the hospital, we will take care of this on Tuesday.         Tess Zimmerman MD    D: 09/02/2018 10:13:59       T: 09/02/2018 13:30:28     [email or web address]  Job#: [id number]     Doc#: [id number]    CC:  <> Will continue current insulin regimen for now. Will continue monitoring FS, log, will Follow up.  Patient counseled for compliance with consistent low carb diet.

## 2018-09-02 NOTE — PROGRESS NOTE ADULT - ASSESSMENT
71 yo Macedonian speaking F w/ CAD s/p stent (2017) and CABG (2014;3 vessel), HFrEF (36%), HTN, T2DM, CKD 2/2 diabetic nephropathy, HLD, hypothyroidism, GERD, w/ acute SOB 2/2 to cardiogenic pulmonary edema; initially on BIPAP, now weaned off to room air and saturating well.      #Neuro  - No active issues     #Skin  - No active issues    # Resp  - Acute onset SOB likely 2/2 acute on chronic HF exacerbation. CXR c/w pulmonary edema.  - Pt weaned off BIPAP, saturating well on room air.   - Patient diuresing well on own, will start standing PO Lasix tomorrow      # Cardio  - Hx of CAD s/p stent (2017) and CABG and HFrEF (36%)  - EKG showed no evidence of ischemia or acute infarct; HsTrops uptrended to 55 will repeat and evaluate. EKG shows no evidence of ST elevations, depressions or T wave inversions  - Will c/w home Toprol-XL 12.5mg, ticagrelor 90mg, ASA 81mg, Atorvastatin 40mg  when Pt can tolerate PO.  - Will follow I/Os, reassess and give lasix PRN; Goals net negative 1-2L    #GI  - Hx of GERD  - Will continue her on home ranitidine when Pt can tolerate PO.    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; Serum Cr appear within baseline range (1.6 -1.7)  - Renally dose all medications    # Metabolic  - Metabolic acidosis w/ AG 23 likely 2/2 lactic acidosis: Lactate improved to 2.1  - Concomitant respiratory alkalosis     #ID  - Cannot rule out basilar PNA will c/w Ceftriaxone and Azithromycin  - RVP negative    #Prophylaxis  - HSQ 71 yo Frisian speaking F w/ CAD s/p stent (2017) and CABG (2014;3 vessel), HFrEF (36%), HTN, T2DM, CKD 2/2 diabetic nephropathy, HLD, hypothyroidism, GERD, w/ acute SOB 2/2 to cardiogenic pulmonary edema; initially on BIPAP, now weaned off to room air and saturating well.      #Neuro  - No active issues     #Skin  - No active issues    # Resp  - Acute onset SOB likely 2/2 acute on chronic HF exacerbation. CXR c/w pulmonary edema.  - Pt weaned off BIPAP, saturating well on room air.   - Patient diuresing well on own, will start standing PO Lasix tomorrow      # Cardio  - Hx of CAD s/p stent (2017) and CABG and HFrEF (36%).  -  Bedside echo shows severely reduced LV function.  - Will restart patient on home med of Toprol-XL 12.5mg, ticagrelor 90mg, ASA 81mg, Atorvastatin 40mg    - Will follow I/Os, reassess and give lasix PRN; Goals net negative 1-2L  - Consider formal TTE     #GI  - Hx of GERD  - Will continue her on home ranitidine when Pt can tolerate PO.    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; Serum Cr appear within baseline range (1.6 -1.7)  - Renally dose all medications    # Metabolic  - Metabolic acidosis w/ AG 23 likely 2/2 lactic acidosis: Lactate improved to 2.1  - Concomitant respiratory alkalosis     #ID  - Cannot rule out basilar PNA will c/w Ceftriaxone and Azithromycin  - RVP negative    #Prophylaxis  - HSQ 73 yo Romansh speaking F w/ CAD s/p stent (2017) and CABG (2014;3 vessel), HFrEF (36%), HTN, T2DM, CKD 2/2 diabetic nephropathy, HLD, hypothyroidism, GERD, w/ acute SOB 2/2 to cardiogenic pulmonary edema; initially on BIPAP, now weaned off to room air and saturating well.      #Neuro  - No active issues     #Skin  - No active issues    # Resp  - Acute onset SOB likely 2/2 acute on chronic HF exacerbation. CXR c/w pulmonary edema.  - Pt weaned off BIPAP, saturating well on room air.   - Patient diuresing well on own, will start standing PO Lasix tomorrow      # Cardio  - Hx of CAD s/p stent (2017) and CABG and HFrEF (36%).  -  Bedside echo shows severely reduced LV function.  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

## 2018-09-02 NOTE — CHART NOTE - NSCHARTNOTEFT_GEN_A_CORE
MICU Transfer Note    MICU COURSE:    71 yo Icelandic speaking F w/ CAD s/p stent (2017) and CABG (2014;3 vessel), HFrEF (36%), HTN, T2DM (a1c: 9.1 11/17), 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 In the MICU, Pt was weaned off BiPAP to NC. Follow up POC ultrasound showed evidence of basilar consolidations. Pt continued on azithromycin and ceftriaxone for PNA. Repeat EKG showed no evidence of ST elevations, depressions or T wave inversions. Hstrops uptrended to 55 then fell to 52. RVP and legionella antigen was negative. Pt respiratory status improved and deemed stable to be transferred to the floor.  The patient was restarted on home meds Toprol-XL 12.5mg, ticagrelor 90mg, ASA 81mg, Atorvastatin 40mg, levothyroxine and ranitidine    ASSESSMENT & PLAN:     71 yo Icelandic speaking F w/ CAD s/p stent (2017) and CABG (2014;3 vessel), HFrEF (32%), HTN, T2DM, CKD 2/2 diabetic nephropathy, HLD, hypothyroidism, GERD, w/ acute hypoxic respiratory failure 2/2 to pulmonary edema in the setting of acute heart failure exacerbation.       For Followup:    [  ] transitioned to PO lasix, to start 40 mg Lasix PO tomorrow, monitor BP and urine output. Wean off oxygen as tolerated  [  ] Consider starting ACE inhibitor if BP allows (in the setting of severely reduced LV function).   [ ] C/w Ceftriaxone and Azithromycin to complete total 5 day course for suspected PNA  [  ] Continue to trend cardiac enzymes, obtain TTE to elevate EF and need for AICD, monitor on telemetry. Follow up cardio wai Finn, PGY3  MAR [id number] MICU Transfer Note    MICU COURSE:    71 yo Slovak speaking F w/ CAD s/p stent (2017) and CABG (2014;3 vessel), HFrEF (36%), HTN, T2DM (a1c: 9.1 11/17), 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 In the MICU, Pt was weaned off BiPAP to NC. Follow up POC ultrasound showed evidence of basilar consolidations. Pt continued on azithromycin and ceftriaxone for PNA. Repeat EKG showed no evidence of ST elevations, depressions or T wave inversions. Hstrops uptrended to 55 then fell to 52. RVP and legionella antigen was negative. Pt respiratory status improved and deemed stable to be transferred to the floor.  The patient was restarted on home meds Toprol-XL 12.5mg, ticagrelor 90mg, ASA 81mg, Atorvastatin 40mg, levothyroxine and ranitidine    ASSESSMENT & PLAN:     71 yo Slovak speaking F w/ CAD s/p stent (2017) and CABG (2014;3 vessel), HFrEF (32%), HTN, T2DM, CKD 2/2 diabetic nephropathy, HLD, hypothyroidism, GERD, w/ acute hypoxic respiratory failure 2/2 to pulmonary edema in the setting of acute heart failure exacerbation.       For Followup:    [  ] transitioned to PO lasix, to start 40 mg Lasix PO tomorrow, monitor BP and urine output. Wean off oxygen as tolerated  [  ] Consider starting ACE inhibitor if BP allows (in the setting of severely reduced LV function).   [ ] C/w Ceftriaxone and Azithromycin to complete total 5 day course for suspected PNA  [  ] Continue to trend cardiac enzymes, obtain TTE to evaluate EF and need for AICD, monitor on telemetry. Follow up cardio wai Finn, PGY3  MAR [id number]

## 2018-09-03 DIAGNOSIS — E87.2 ACIDOSIS: ICD-10-CM

## 2018-09-03 LAB
ALBUMIN SERPL ELPH-MCNC: 3.5 G/DL — SIGNIFICANT CHANGE UP (ref 3.3–5)
ALP SERPL-CCNC: 60 U/L — SIGNIFICANT CHANGE UP (ref 40–120)
ALT FLD-CCNC: 20 U/L — SIGNIFICANT CHANGE UP (ref 4–33)
AST SERPL-CCNC: 28 U/L — SIGNIFICANT CHANGE UP (ref 4–32)
BASOPHILS # BLD AUTO: 0.04 K/UL — SIGNIFICANT CHANGE UP (ref 0–0.2)
BASOPHILS NFR BLD AUTO: 0.4 % — SIGNIFICANT CHANGE UP (ref 0–2)
BILIRUB SERPL-MCNC: 0.4 MG/DL — SIGNIFICANT CHANGE UP (ref 0.2–1.2)
BUN SERPL-MCNC: 25 MG/DL — HIGH (ref 7–23)
CALCIUM SERPL-MCNC: 8.5 MG/DL — SIGNIFICANT CHANGE UP (ref 8.4–10.5)
CHLORIDE SERPL-SCNC: 102 MMOL/L — SIGNIFICANT CHANGE UP (ref 98–107)
CO2 SERPL-SCNC: 17 MMOL/L — LOW (ref 22–31)
CREAT SERPL-MCNC: 1.42 MG/DL — HIGH (ref 0.5–1.3)
EOSINOPHIL # BLD AUTO: 0.22 K/UL — SIGNIFICANT CHANGE UP (ref 0–0.5)
EOSINOPHIL NFR BLD AUTO: 2.1 % — SIGNIFICANT CHANGE UP (ref 0–6)
GLUCOSE SERPL-MCNC: 256 MG/DL — HIGH (ref 70–99)
HCT VFR BLD CALC: 36 % — SIGNIFICANT CHANGE UP (ref 34.5–45)
HGB BLD-MCNC: 11.6 G/DL — SIGNIFICANT CHANGE UP (ref 11.5–15.5)
IMM GRANULOCYTES # BLD AUTO: 0.08 # — SIGNIFICANT CHANGE UP
IMM GRANULOCYTES NFR BLD AUTO: 0.7 % — SIGNIFICANT CHANGE UP (ref 0–1.5)
LYMPHOCYTES # BLD AUTO: 1.52 K/UL — SIGNIFICANT CHANGE UP (ref 1–3.3)
LYMPHOCYTES # BLD AUTO: 14.2 % — SIGNIFICANT CHANGE UP (ref 13–44)
MAGNESIUM SERPL-MCNC: 2.1 MG/DL — SIGNIFICANT CHANGE UP (ref 1.6–2.6)
MCHC RBC-ENTMCNC: 29.4 PG — SIGNIFICANT CHANGE UP (ref 27–34)
MCHC RBC-ENTMCNC: 32.2 % — SIGNIFICANT CHANGE UP (ref 32–36)
MCV RBC AUTO: 91.4 FL — SIGNIFICANT CHANGE UP (ref 80–100)
MONOCYTES # BLD AUTO: 0.74 K/UL — SIGNIFICANT CHANGE UP (ref 0–0.9)
MONOCYTES NFR BLD AUTO: 6.9 % — SIGNIFICANT CHANGE UP (ref 2–14)
NEUTROPHILS # BLD AUTO: 8.11 K/UL — HIGH (ref 1.8–7.4)
NEUTROPHILS NFR BLD AUTO: 75.7 % — SIGNIFICANT CHANGE UP (ref 43–77)
NRBC # FLD: 0 — SIGNIFICANT CHANGE UP
PHOSPHATE SERPL-MCNC: 3.1 MG/DL — SIGNIFICANT CHANGE UP (ref 2.5–4.5)
PLATELET # BLD AUTO: 251 K/UL — SIGNIFICANT CHANGE UP (ref 150–400)
PMV BLD: 8.9 FL — SIGNIFICANT CHANGE UP (ref 7–13)
POTASSIUM SERPL-MCNC: 4 MMOL/L — SIGNIFICANT CHANGE UP (ref 3.5–5.3)
POTASSIUM SERPL-SCNC: 4 MMOL/L — SIGNIFICANT CHANGE UP (ref 3.5–5.3)
PROT SERPL-MCNC: 7.2 G/DL — SIGNIFICANT CHANGE UP (ref 6–8.3)
RBC # BLD: 3.94 M/UL — SIGNIFICANT CHANGE UP (ref 3.8–5.2)
RBC # FLD: 14.2 % — SIGNIFICANT CHANGE UP (ref 10.3–14.5)
SODIUM SERPL-SCNC: 137 MMOL/L — SIGNIFICANT CHANGE UP (ref 135–145)
WBC # BLD: 10.71 K/UL — HIGH (ref 3.8–10.5)
WBC # FLD AUTO: 10.71 K/UL — HIGH (ref 3.8–10.5)

## 2018-09-03 RX ORDER — INSULIN LISPRO 100/ML
VIAL (ML) SUBCUTANEOUS AT BEDTIME
Qty: 0 | Refills: 0 | Status: DISCONTINUED | OUTPATIENT
Start: 2018-09-03 | End: 2018-09-10

## 2018-09-03 RX ORDER — INSULIN LISPRO 100/ML
VIAL (ML) SUBCUTANEOUS AT BEDTIME
Qty: 0 | Refills: 0 | Status: DISCONTINUED | OUTPATIENT
Start: 2018-09-03 | End: 2018-09-03

## 2018-09-03 RX ORDER — FUROSEMIDE 40 MG
40 TABLET ORAL DAILY
Qty: 0 | Refills: 0 | Status: DISCONTINUED | OUTPATIENT
Start: 2018-09-03 | End: 2018-09-06

## 2018-09-03 RX ADMIN — INSULIN GLARGINE 20 UNIT(S): 100 INJECTION, SOLUTION SUBCUTANEOUS at 09:01

## 2018-09-03 RX ADMIN — Medication 4: at 22:48

## 2018-09-03 RX ADMIN — INSULIN GLARGINE 60 UNIT(S): 100 INJECTION, SOLUTION SUBCUTANEOUS at 22:08

## 2018-09-03 RX ADMIN — TICAGRELOR 90 MILLIGRAM(S): 90 TABLET ORAL at 17:22

## 2018-09-03 RX ADMIN — Medication 4: at 13:26

## 2018-09-03 RX ADMIN — Medication 81 MILLIGRAM(S): at 09:02

## 2018-09-03 RX ADMIN — Medication 1 TABLET(S): at 17:22

## 2018-09-03 RX ADMIN — Medication 12.5 MILLIGRAM(S): at 17:22

## 2018-09-03 RX ADMIN — ATORVASTATIN CALCIUM 40 MILLIGRAM(S): 80 TABLET, FILM COATED ORAL at 22:09

## 2018-09-03 RX ADMIN — Medication 1 TABLET(S): at 09:01

## 2018-09-03 RX ADMIN — HEPARIN SODIUM 5000 UNIT(S): 5000 INJECTION INTRAVENOUS; SUBCUTANEOUS at 06:04

## 2018-09-03 RX ADMIN — Medication 12.5 MILLIGRAM(S): at 06:03

## 2018-09-03 RX ADMIN — Medication 2: at 17:22

## 2018-09-03 RX ADMIN — TICAGRELOR 90 MILLIGRAM(S): 90 TABLET ORAL at 06:03

## 2018-09-03 RX ADMIN — Medication 40 MILLIGRAM(S): at 06:03

## 2018-09-03 RX ADMIN — Medication 2: at 09:01

## 2018-09-03 RX ADMIN — Medication 1 TABLET(S): at 13:26

## 2018-09-03 RX ADMIN — Medication 50 MICROGRAM(S): at 05:00

## 2018-09-03 RX ADMIN — HEPARIN SODIUM 5000 UNIT(S): 5000 INJECTION INTRAVENOUS; SUBCUTANEOUS at 22:09

## 2018-09-03 RX ADMIN — CEFTRIAXONE 100 GRAM(S): 500 INJECTION, POWDER, FOR SOLUTION INTRAMUSCULAR; INTRAVENOUS at 02:15

## 2018-09-03 RX ADMIN — AZITHROMYCIN 250 MILLIGRAM(S): 500 TABLET, FILM COATED ORAL at 01:13

## 2018-09-03 RX ADMIN — Medication 1 TABLET(S): at 22:09

## 2018-09-03 NOTE — PROGRESS NOTE ADULT - SUBJECTIVE AND OBJECTIVE BOX
Patient seen and examined at bedside. No chest pain/sob    VITALS:  T(F): 97.9 (09-03-18 @ 06:06), Max: 97.9 (09-03-18 @ 06:06)  HR: 76 (09-03-18 @ 06:06)  BP: 115/74 (09-03-18 @ 06:06)  RR: 16 (09-03-18 @ 06:06)  SpO2: 100% (09-03-18 @ 06:06)  Wt(kg): --    09-02 @ 07:01  -  09-03 @ 07:00  --------------------------------------------------------  IN: 700 mL / OUT: 816 mL / NET: -116 mL          PHYSICAL EXAM:  Constitutional: NAD  Neck: No JVD  Respiratory: CTAB, no wheezes, rales or rhonchi  Cardiovascular: S1, S2, RRR  Gastrointestinal: BS+, soft, NT/ND  Extremities: No peripheral edema    Hospital Medications:   MEDICATIONS  (STANDING):  aspirin  chewable 81 milliGRAM(s) Oral daily  atorvastatin 40 milliGRAM(s) Oral at bedtime  azithromycin  IVPB 500 milliGRAM(s) IV Intermittent every 24 hours  cefTRIAXone   IVPB 1 Gram(s) IV Intermittent every 24 hours  furosemide    Tablet 40 milliGRAM(s) Oral daily  heparin  Injectable 5000 Unit(s) SubCutaneous every 8 hours  insulin glargine Injectable (LANTUS) 20 Unit(s) SubCutaneous every morning  insulin glargine Injectable (LANTUS) 60 Unit(s) SubCutaneous at bedtime  insulin lispro (HumaLOG) corrective regimen sliding scale   SubCutaneous three times a day with meals  levothyroxine 50 MICROGram(s) Oral daily  metoprolol tartrate 12.5 milliGRAM(s) Oral two times a day  potassium acid phosphate/sodium acid phosphate tablet (K-PHOS No. 2) 1 Tablet(s) Oral four times a day with meals  ticagrelor 90 milliGRAM(s) Oral two times a day      LABS:  09-03    137  |  102  |  25<H>  ----------------------------<  256<H>  4.0   |  17<L>  |  1.42<H>    Ca    8.5      03 Sep 2018 03:40  Phos  3.1     09-03  Mg     2.1     09-03    TPro  7.2  /  Alb  3.5  /  TBili  0.4  /  DBili      /  AST  28  /  ALT  20  /  AlkPhos  60  09-03    Creatinine Trend: 1.42 <--, 1.60 <--, 1.49 <--, 1.67 <--, 1.66 <--  Albumin, Serum: 3.5 g/dL (09-03 @ 03:40)  Phosphorus Level, Serum: 3.1 mg/dL (09-03 @ 03:40)                              11.6   10.71 )-----------( 251      ( 03 Sep 2018 03:40 )             36.0     Urine Studies:      Urinalysis - [09-01-18 @ 04:29]      Color YELLOW / Appearance CLEAR / SG 1.015 / pH 6.5      Gluc 250 / Ketone NEGATIVE  / Bili NEGATIVE / Urobili NORMAL       Blood MODERATE / Protein MODERATE / Leuk Est NEGATIVE / Nitrite NEGATIVE      RBC 5-10 / WBC 2-5 / Hyaline  / Gran  / Sq Epi OCC. / Non Sq Epi  / Bacteria       Iron 40, TIBC 377, %sat --      [01-20-18 @ 06:06]  Ferritin 38.35      [01-20-18 @ 06:06]  HbA1c 7.4      [09-02-18 @ 02:30]  TSH 0.55      [01-19-18 @ 06:45]  Lipid: chol 130, , HDL 30, LDL 70      [01-19-18 @ 06:45]        RADIOLOGY & ADDITIONAL STUDIES:

## 2018-09-03 NOTE — PROGRESS NOTE ADULT - PROBLEM SELECTOR PLAN 1
Renal function better than baseline today.  Baseline SCr 1.5-1.8  Renal function fluctuates sec to CHF  Monitor renal function and electrolyte.

## 2018-09-03 NOTE — PROGRESS NOTE ADULT - ASSESSMENT
71 yo Telugu speaking F w/ CAD s/p stent (2017) and CABG (2014;3 vessel), HFrEF (36%), HTN, T2DM (a1c: 9.1 11/17), CKD 2/2 diabetic nephropathy, HLD, hypothyroidism, GERD, presented to ED w/ SOB for 1 day. Patient was hypoxic got admitted to MICU being treated for CHF and pneumonia

## 2018-09-03 NOTE — PROGRESS NOTE ADULT - SUBJECTIVE AND OBJECTIVE BOX
CARDIOLOGY ATTENDING    no palpitations, no syncope, no dyspnea, still having atypical chest pain    aluminum hydroxide/magnesium hydroxide/simethicone Suspension 30 milliLiter(s) Oral every 4 hours PRN  aspirin  chewable 81 milliGRAM(s) Oral daily  atorvastatin 40 milliGRAM(s) Oral at bedtime  azithromycin  IVPB 500 milliGRAM(s) IV Intermittent every 24 hours  cefTRIAXone   IVPB 1 Gram(s) IV Intermittent every 24 hours  furosemide    Tablet 40 milliGRAM(s) Oral daily  heparin  Injectable 5000 Unit(s) SubCutaneous every 8 hours  insulin glargine Injectable (LANTUS) 20 Unit(s) SubCutaneous every morning  insulin glargine Injectable (LANTUS) 60 Unit(s) SubCutaneous at bedtime  insulin lispro (HumaLOG) corrective regimen sliding scale   SubCutaneous three times a day with meals  levothyroxine 50 MICROGram(s) Oral daily  metoprolol tartrate 12.5 milliGRAM(s) Oral two times a day  potassium acid phosphate/sodium acid phosphate tablet (K-PHOS No. 2) 1 Tablet(s) Oral four times a day with meals  ticagrelor 90 milliGRAM(s) Oral two times a day                            11.6   10.71 )-----------( 251      ( 03 Sep 2018 03:40 )             36.0       09-03    137  |  102  |  25<H>  ----------------------------<  256<H>  4.0   |  17<L>  |  1.42<H>    Ca    8.5      03 Sep 2018 03:40  Phos  3.1     09-03  Mg     2.1     09-03    TPro  7.2  /  Alb  3.5  /  TBili  0.4  /  DBili  x   /  AST  28  /  ALT  20  /  AlkPhos  60  09-03      CARDIAC MARKERS ( 01 Sep 2018 02:00 )  x     / x     / 193 u/L / 4.51 ng/mL / x            T(C): 36.6 (09-03-18 @ 06:06), Max: 36.6 (09-02-18 @ 22:18)  HR: 76 (09-03-18 @ 06:06) (76 - 99)  BP: 115/74 (09-03-18 @ 06:06) (95/77 - 154/87)  RR: 16 (09-03-18 @ 06:06) (16 - 37)  SpO2: 100% (09-03-18 @ 06:06) (94% - 100%)  Wt(kg): --    no JVD  RRR, no murmurs  CTAB  soft nt/nd  no c/c/e    echo pending  cath possibly pending      ASSESSMENT/PLAN: 71 y/o Female PMH HTN, mild CKD, Hyperlipidemia, DM-II, CAD s/p 3V CABG  (LIMA to LAD, SVG to OM, SVG to PDA) at [facility] 2014, s/p CORNELIA TO [facility] 11/17/17, NSTEMI 12/2017 s/p [facility] on 12/5 and CORNELIA to [facility], TTE at that time revealed grossly moderate to severe LV dysfunction with hypokinesis of the inferior, lateral and inferolateral walls with an EF of 36% s/p C on 2/6  with no new OBS CAD, managed medically, admitted to MICU with SOB, Cough, Pulm edema, acute on chronic systolic HF and ?PNA. C/O chest pressure and positive troponin. She has a KNOWN  of the RCA fed by LAD collaterals. This is likely the culprit of her mild EKG changes and mild troponin elevation.       --Cont to trend CE incl CPK and Trop T  --IV Lasix to keep O>I  --Abx per primary team  --Repeat TTE  --Would trend troponin including CPKs/CPKMB and get repeat echo to assure she does not need an ICD.   --if repeat echo shows that LVEF < 35% then would repeat cath and then implant ICD if no revascularization performed

## 2018-09-04 LAB
BUN SERPL-MCNC: 39 MG/DL — HIGH (ref 7–23)
CALCIUM SERPL-MCNC: 8.9 MG/DL — SIGNIFICANT CHANGE UP (ref 8.4–10.5)
CHLORIDE SERPL-SCNC: 101 MMOL/L — SIGNIFICANT CHANGE UP (ref 98–107)
CK MB BLD-MCNC: 3.9 NG/ML — SIGNIFICANT CHANGE UP (ref 1–4.7)
CK MB BLD-MCNC: SIGNIFICANT CHANGE UP (ref 0–2.5)
CK SERPL-CCNC: 135 U/L — SIGNIFICANT CHANGE UP (ref 25–170)
CO2 SERPL-SCNC: 20 MMOL/L — LOW (ref 22–31)
CREAT SERPL-MCNC: 1.73 MG/DL — HIGH (ref 0.5–1.3)
GLUCOSE SERPL-MCNC: 157 MG/DL — HIGH (ref 70–99)
MAGNESIUM SERPL-MCNC: 2.4 MG/DL — SIGNIFICANT CHANGE UP (ref 1.6–2.6)
POTASSIUM SERPL-MCNC: 3.9 MMOL/L — SIGNIFICANT CHANGE UP (ref 3.5–5.3)
POTASSIUM SERPL-SCNC: 3.9 MMOL/L — SIGNIFICANT CHANGE UP (ref 3.5–5.3)
SODIUM SERPL-SCNC: 136 MMOL/L — SIGNIFICANT CHANGE UP (ref 135–145)
TROPONIN T, HIGH SENSITIVITY: 52 NG/L — HIGH (ref ?–14)

## 2018-09-04 RX ORDER — ACETAMINOPHEN 500 MG
650 TABLET ORAL ONCE
Qty: 0 | Refills: 0 | Status: COMPLETED | OUTPATIENT
Start: 2018-09-04 | End: 2018-09-04

## 2018-09-04 RX ADMIN — CEFTRIAXONE 100 GRAM(S): 500 INJECTION, POWDER, FOR SOLUTION INTRAMUSCULAR; INTRAVENOUS at 02:03

## 2018-09-04 RX ADMIN — INSULIN GLARGINE 60 UNIT(S): 100 INJECTION, SOLUTION SUBCUTANEOUS at 22:44

## 2018-09-04 RX ADMIN — ATORVASTATIN CALCIUM 40 MILLIGRAM(S): 80 TABLET, FILM COATED ORAL at 22:45

## 2018-09-04 RX ADMIN — Medication 50 MICROGRAM(S): at 06:05

## 2018-09-04 RX ADMIN — Medication 4: at 13:23

## 2018-09-04 RX ADMIN — Medication 81 MILLIGRAM(S): at 09:14

## 2018-09-04 RX ADMIN — Medication 6: at 17:31

## 2018-09-04 RX ADMIN — INSULIN GLARGINE 20 UNIT(S): 100 INJECTION, SOLUTION SUBCUTANEOUS at 09:14

## 2018-09-04 RX ADMIN — Medication 40 MILLIGRAM(S): at 06:05

## 2018-09-04 RX ADMIN — HEPARIN SODIUM 5000 UNIT(S): 5000 INJECTION INTRAVENOUS; SUBCUTANEOUS at 06:05

## 2018-09-04 RX ADMIN — TICAGRELOR 90 MILLIGRAM(S): 90 TABLET ORAL at 17:31

## 2018-09-04 RX ADMIN — HEPARIN SODIUM 5000 UNIT(S): 5000 INJECTION INTRAVENOUS; SUBCUTANEOUS at 22:45

## 2018-09-04 RX ADMIN — Medication 6: at 22:44

## 2018-09-04 RX ADMIN — Medication 650 MILLIGRAM(S): at 21:19

## 2018-09-04 RX ADMIN — Medication 650 MILLIGRAM(S): at 22:00

## 2018-09-04 RX ADMIN — TICAGRELOR 90 MILLIGRAM(S): 90 TABLET ORAL at 06:05

## 2018-09-04 RX ADMIN — AZITHROMYCIN 250 MILLIGRAM(S): 500 TABLET, FILM COATED ORAL at 01:06

## 2018-09-04 RX ADMIN — Medication 650 MILLIGRAM(S): at 02:17

## 2018-09-04 RX ADMIN — Medication 650 MILLIGRAM(S): at 03:00

## 2018-09-04 RX ADMIN — Medication 12.5 MILLIGRAM(S): at 17:31

## 2018-09-04 RX ADMIN — Medication 12.5 MILLIGRAM(S): at 06:05

## 2018-09-04 NOTE — PROGRESS NOTE ADULT - SUBJECTIVE AND OBJECTIVE BOX
no palpitations, no syncope, no dyspnea, still having atypical chest pain      MEDICATIONS  (STANDING):  aspirin  chewable 81 milliGRAM(s) Oral daily  atorvastatin 40 milliGRAM(s) Oral at bedtime  azithromycin  IVPB 500 milliGRAM(s) IV Intermittent every 24 hours  cefTRIAXone   IVPB 1 Gram(s) IV Intermittent every 24 hours  furosemide   Injectable 40 milliGRAM(s) IV Push daily  heparin  Injectable 5000 Unit(s) SubCutaneous every 8 hours  insulin glargine Injectable (LANTUS) 20 Unit(s) SubCutaneous every morning  insulin glargine Injectable (LANTUS) 60 Unit(s) SubCutaneous at bedtime  insulin lispro (HumaLOG) corrective regimen sliding scale   SubCutaneous three times a day with meals  insulin lispro (HumaLOG) corrective regimen sliding scale   SubCutaneous at bedtime  levothyroxine 50 MICROGram(s) Oral daily  metoprolol tartrate 12.5 milliGRAM(s) Oral two times a day  ticagrelor 90 milliGRAM(s) Oral two times a day    MEDICATIONS  (PRN):  aluminum hydroxide/magnesium hydroxide/simethicone Suspension 30 milliLiter(s) Oral every 4 hours PRN Dyspepsia      LABS:                        11.6   10.71 )-----------( 251      ( 03 Sep 2018 03:40 )             36.0    136  |  101  |  39<H>  ----------------------------<  157<H>  3.9   |  20<L>  |  1.73<H>    Ca    8.9      04 Sep 2018 06:40  Phos  3.1     09-03  Mg     2.4     09-04    TPro  7.2  /  Alb  3.5  /  TBili  0.4  /  DBili  x   /  AST  28  /  ALT  20  /  AlkPhos  60  09-03    Creatinine Trend: 1.73<--, 1.42<--, 1.60<--, 1.49<--, 1.67<--, 1.66<--     CARDIAC MARKERS ( 04 Sep 2018 06:40 )  x     / x     / 135 u/L / 3.90 ng/mL / x          PHYSICAL EXAM  Vital Signs Last 24 Hrs  T(C): 36.3 (04 Sep 2018 06:01), Max: 36.6 (03 Sep 2018 12:21)  T(F): 97.3 (04 Sep 2018 06:01), Max: 97.8 (03 Sep 2018 12:21)  HR: 72 (04 Sep 2018 06:01) (72 - 88)  BP: 103/58 (04 Sep 2018 06:01) (103/58 - 124/61)  RR: 18 (04 Sep 2018 06:01) (18 - 18)  SpO2: 100% (04 Sep 2018 06:01) (100% - 100%)    no JVD  RRR, no murmurs  CTAB  soft nt/nd  no c/c/e      < from: TTE with Doppler (w/Cont) (01.23.18 @ 12:31) >  CONCLUSIONS:  1. Mitral annular calcification, otherwise normal mitral  valve. Mild-moderate mitral regurgitation.  2. Calcified trileaflet aortic valve with normal opening.  Mild aortic regurgitation.  3. Normal left ventricular internal dimensions and wall  thicknesses.  4. Endocardium not well visualized; grossly severe global  left ventricular systolic dysfunction.  Endocardial  visualization enhanced with intravenous injection of echo  contrast (Definity).  5. The right ventricle is not well visualized; grossly  normal right ventricular systolic function.  *** Compared with echocardiogram of 11/25/2017, no  significant changes noted.  ------------------------------------------------------------------------  Confirmed on  1/23/2018 - 14:35:49 by Jose Lucia M.D.    < end of copied text >      ASSESSMENT/PLAN: 71 y/o Female PMH HTN, mild CKD, Hyperlipidemia, DM-II, CAD s/p 3V CABG  (LIMA to LAD, SVG to OM, SVG to PDA) at [facility] 2014, s/p CORNELIA TO RI 11/17/17, NSTEMI 12/2017 s/p [facility] on 12/5 and CORNELIA to [facility], TTE at that time revealed grossly moderate to severe LV dysfunction with hypokinesis of the inferior, lateral and inferolateral walls with an EF of 36% s/p [facility] on 2/6  with no new OBS CAD, managed medically, admitted to MICU with SOB, Cough, Pulm edema, acute on chronic systolic HF and ?PNA. C/O chest pressure and positive troponin. She has a KNOWN  of the RCA fed by LAD collaterals. This is likely the culprit of her mild EKG changes and mild troponin elevation.     --Cont to trend CE incl CPK and Trop T  --IV Lasix to keep O>I  --Abx per primary team  --Repeat TTE  --Would trend troponin including CPKs/CPKMB and get repeat echo to assure she does not need an ICD.   --if repeat echo shows that LVEF < 35% then would repeat cath and then implant ICD if no revascularization performed

## 2018-09-04 NOTE — PROGRESS NOTE ADULT - SUBJECTIVE AND OBJECTIVE BOX
[facility] [place] [facility]   MD RAHEEL SHAH MD RUORU WONG, PA    TEL:  OFFICE: [phone]  DR SANTOYO CELL: [phone]  JUSTEN KENDALL CELL: [phone]  DR. 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 Patient was hypoxic got admitted to MICU being treated for CHF and pneumonia          PAST HISTORY  --------------------------------------------------------------------------------  No significant changes to PMH, PSH, FHx, SHx, unless otherwise noted    ALLERGIES & MEDICATIONS  --------------------------------------------------------------------------------  Allergies    No Known Allergies    Intolerances      Standing Inpatient Medications  aspirin  chewable 81 milliGRAM(s) Oral daily  atorvastatin 40 milliGRAM(s) Oral at bedtime  azithromycin  IVPB 500 milliGRAM(s) IV Intermittent every 24 hours  cefTRIAXone   IVPB 1 Gram(s) IV Intermittent every 24 hours  furosemide   Injectable 40 milliGRAM(s) IV Push daily  heparin  Injectable 5000 Unit(s) SubCutaneous every 8 hours  insulin glargine Injectable (LANTUS) 20 Unit(s) SubCutaneous every morning  insulin glargine Injectable (LANTUS) 60 Unit(s) SubCutaneous at bedtime  insulin lispro (HumaLOG) corrective regimen sliding scale   SubCutaneous three times a day with meals  insulin lispro (HumaLOG) corrective regimen sliding scale   SubCutaneous at bedtime  levothyroxine 50 MICROGram(s) Oral daily  metoprolol tartrate 12.5 milliGRAM(s) Oral two times a day  ticagrelor 90 milliGRAM(s) Oral two times a day    PRN Inpatient Medications  aluminum hydroxide/magnesium hydroxide/simethicone Suspension 30 milliLiter(s) Oral every 4 hours PRN      REVIEW OF SYSTEMS  --------------------------------------------------------------------------------  General: no fever  CVS: + intermittent chest pain  RESP: no sob, no cough  ABD: no abdominal pain  : no dysuria,  MSK: no edema     VITALS/PHYSICAL EXAM  --------------------------------------------------------------------------------  T(C): 36.3 (09-04-18 @ 12:47), Max: 36.4 (09-03-18 @ 22:06)  HR: 74 (09-04-18 @ 12:47) (72 - 88)  BP: 105/60 (09-04-18 @ 12:47) (103/58 - 124/61)  RR: 16 (09-04-18 @ 12:47) (16 - 18)  SpO2: 100% (09-04-18 @ 12:47) (100% - 100%)  Wt(kg): --        09-03-18 @ 07:01  -  09-04-18 @ 07:00  --------------------------------------------------------  IN: 240 mL / OUT: 300 mL / NET: -60 mL    09-04-18 @ 07:01  -  09-04-18 @ 14:57  --------------------------------------------------------  IN: 400 mL / OUT: 400 mL / NET: 0 mL      Physical Exam:  	Gen: NAD  	HEENT: MMMARY  	Pulm: CTA B/L  	CV: S1S2  	Abd: Soft, +BS  	Ext: No LE edema B/L                      Neuro: Awake   	Skin: Warm and Dry       LABS/STUDIES  --------------------------------------------------------------------------------              11.6   10.71 >-----------<  251      [09-03-18 @ 03:40]              36.0     136  |  101  |  39  ----------------------------<  157      [09-04-18 @ 06:40]  3.9   |  20  |  1.73        Ca     8.9     [09-04-18 @ 06:40]      Mg     2.4     [09-04-18 @ 06:40]      Phos  3.1     [09-03-18 @ 03:40]    TPro  7.2  /  Alb  3.5  /  TBili  0.4  /  DBili  x   /  AST  28  /  ALT  20  /  AlkPhos  60  [09-03-18 @ 03:40]              [09-04-18 @ 06:40]    Creatinine Trend:  SCr 1.73 [09-04 @ 06:40]  SCr 1.42 [09-03 @ 03:40]  SCr 1.60 [09-02 @ 03:45]  SCr 1.49 [09-02 @ 02:30]  SCr 1.67 [09-01 @ 09:30]    Urinalysis - [09-01-18 @ 04:29]      Color YELLOW / Appearance CLEAR / SG 1.015 / pH 6.5      Gluc 250 / Ketone NEGATIVE  / Bili NEGATIVE / Urobili NORMAL       Blood MODERATE / Protein MODERATE / Leuk Est NEGATIVE / Nitrite NEGATIVE      RBC 5-10 / WBC 2-5 / Hyaline  / Gran  / Sq Epi OCC. / Non Sq Epi  / Bacteria       Iron 40, TIBC 377, %sat --      [01-20-18 @ 06:06]  Ferritin 38.35      [01-20-18 @ 06:06]  HbA1c 7.4      [09-02-18 @ 02:30]  TSH 0.55      [01-19-18 @ 06:45]  Lipid: chol 130, , HDL 30, LDL 70      [01-19-18 @ 06:45]

## 2018-09-04 NOTE — PROGRESS NOTE ADULT - PROBLEM SELECTOR PLAN 1
Baseline SCr 1.5-1.8  Renal function fluctuates sec to CHF  Monitor renal function and electrolyte.   avoid nephrotoxics, NSAIDS RCA

## 2018-09-04 NOTE — PROGRESS NOTE ADULT - ASSESSMENT
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 Patient was hypoxic got admitted to MICU being treated for CHF and pneumonia

## 2018-09-05 LAB
BASOPHILS # BLD AUTO: 0.07 K/UL — SIGNIFICANT CHANGE UP (ref 0–0.2)
BASOPHILS NFR BLD AUTO: 0.7 % — SIGNIFICANT CHANGE UP (ref 0–2)
BUN SERPL-MCNC: 40 MG/DL — HIGH (ref 7–23)
CALCIUM SERPL-MCNC: 10.3 MG/DL — SIGNIFICANT CHANGE UP (ref 8.4–10.5)
CHLORIDE SERPL-SCNC: 102 MMOL/L — SIGNIFICANT CHANGE UP (ref 98–107)
CO2 SERPL-SCNC: 20 MMOL/L — LOW (ref 22–31)
CREAT SERPL-MCNC: 1.63 MG/DL — HIGH (ref 0.5–1.3)
EOSINOPHIL # BLD AUTO: 0.57 K/UL — HIGH (ref 0–0.5)
EOSINOPHIL NFR BLD AUTO: 5.4 % — SIGNIFICANT CHANGE UP (ref 0–6)
GLUCOSE SERPL-MCNC: 82 MG/DL — SIGNIFICANT CHANGE UP (ref 70–99)
HCT VFR BLD CALC: 37.9 % — SIGNIFICANT CHANGE UP (ref 34.5–45)
HGB BLD-MCNC: 12.5 G/DL — SIGNIFICANT CHANGE UP (ref 11.5–15.5)
IMM GRANULOCYTES # BLD AUTO: 0.15 # — SIGNIFICANT CHANGE UP
IMM GRANULOCYTES NFR BLD AUTO: 1.4 % — SIGNIFICANT CHANGE UP (ref 0–1.5)
LYMPHOCYTES # BLD AUTO: 2.38 K/UL — SIGNIFICANT CHANGE UP (ref 1–3.3)
LYMPHOCYTES # BLD AUTO: 22.8 % — SIGNIFICANT CHANGE UP (ref 13–44)
MAGNESIUM SERPL-MCNC: 2.4 MG/DL — SIGNIFICANT CHANGE UP (ref 1.6–2.6)
MCHC RBC-ENTMCNC: 29.8 PG — SIGNIFICANT CHANGE UP (ref 27–34)
MCHC RBC-ENTMCNC: 33 % — SIGNIFICANT CHANGE UP (ref 32–36)
MCV RBC AUTO: 90.5 FL — SIGNIFICANT CHANGE UP (ref 80–100)
MONOCYTES # BLD AUTO: 0.92 K/UL — HIGH (ref 0–0.9)
MONOCYTES NFR BLD AUTO: 8.8 % — SIGNIFICANT CHANGE UP (ref 2–14)
NEUTROPHILS # BLD AUTO: 6.37 K/UL — SIGNIFICANT CHANGE UP (ref 1.8–7.4)
NEUTROPHILS NFR BLD AUTO: 60.9 % — SIGNIFICANT CHANGE UP (ref 43–77)
NRBC # FLD: 0 — SIGNIFICANT CHANGE UP
PLATELET # BLD AUTO: 288 K/UL — SIGNIFICANT CHANGE UP (ref 150–400)
PMV BLD: 8.9 FL — SIGNIFICANT CHANGE UP (ref 7–13)
POTASSIUM SERPL-MCNC: 4 MMOL/L — SIGNIFICANT CHANGE UP (ref 3.5–5.3)
POTASSIUM SERPL-SCNC: 4 MMOL/L — SIGNIFICANT CHANGE UP (ref 3.5–5.3)
RBC # BLD: 4.19 M/UL — SIGNIFICANT CHANGE UP (ref 3.8–5.2)
RBC # FLD: 14 % — SIGNIFICANT CHANGE UP (ref 10.3–14.5)
SODIUM SERPL-SCNC: 139 MMOL/L — SIGNIFICANT CHANGE UP (ref 135–145)
WBC # BLD: 10.46 K/UL — SIGNIFICANT CHANGE UP (ref 3.8–10.5)
WBC # FLD AUTO: 10.46 K/UL — SIGNIFICANT CHANGE UP (ref 3.8–10.5)

## 2018-09-05 PROCEDURE — 73620 X-RAY EXAM OF FOOT: CPT | Mod: 26,LT

## 2018-09-05 RX ORDER — ACETAMINOPHEN 500 MG
650 TABLET ORAL EVERY 6 HOURS
Qty: 0 | Refills: 0 | Status: DISCONTINUED | OUTPATIENT
Start: 2018-09-05 | End: 2018-09-10

## 2018-09-05 RX ADMIN — HEPARIN SODIUM 5000 UNIT(S): 5000 INJECTION INTRAVENOUS; SUBCUTANEOUS at 22:15

## 2018-09-05 RX ADMIN — INSULIN GLARGINE 20 UNIT(S): 100 INJECTION, SOLUTION SUBCUTANEOUS at 10:00

## 2018-09-05 RX ADMIN — CEFTRIAXONE 100 GRAM(S): 500 INJECTION, POWDER, FOR SOLUTION INTRAMUSCULAR; INTRAVENOUS at 02:14

## 2018-09-05 RX ADMIN — AZITHROMYCIN 250 MILLIGRAM(S): 500 TABLET, FILM COATED ORAL at 01:08

## 2018-09-05 RX ADMIN — Medication 40 MILLIGRAM(S): at 05:39

## 2018-09-05 RX ADMIN — Medication 2: at 18:45

## 2018-09-05 RX ADMIN — Medication 650 MILLIGRAM(S): at 13:35

## 2018-09-05 RX ADMIN — TICAGRELOR 90 MILLIGRAM(S): 90 TABLET ORAL at 18:24

## 2018-09-05 RX ADMIN — Medication 50 MICROGRAM(S): at 05:41

## 2018-09-05 RX ADMIN — ATORVASTATIN CALCIUM 40 MILLIGRAM(S): 80 TABLET, FILM COATED ORAL at 22:13

## 2018-09-05 RX ADMIN — Medication 4: at 22:14

## 2018-09-05 RX ADMIN — Medication 12.5 MILLIGRAM(S): at 18:24

## 2018-09-05 RX ADMIN — TICAGRELOR 90 MILLIGRAM(S): 90 TABLET ORAL at 05:40

## 2018-09-05 RX ADMIN — Medication 650 MILLIGRAM(S): at 14:05

## 2018-09-05 RX ADMIN — HEPARIN SODIUM 5000 UNIT(S): 5000 INJECTION INTRAVENOUS; SUBCUTANEOUS at 05:40

## 2018-09-05 RX ADMIN — HEPARIN SODIUM 5000 UNIT(S): 5000 INJECTION INTRAVENOUS; SUBCUTANEOUS at 13:36

## 2018-09-05 RX ADMIN — INSULIN GLARGINE 60 UNIT(S): 100 INJECTION, SOLUTION SUBCUTANEOUS at 22:14

## 2018-09-05 RX ADMIN — Medication 650 MILLIGRAM(S): at 23:00

## 2018-09-05 RX ADMIN — Medication 4: at 13:35

## 2018-09-05 RX ADMIN — Medication 81 MILLIGRAM(S): at 13:36

## 2018-09-05 RX ADMIN — Medication 12.5 MILLIGRAM(S): at 05:40

## 2018-09-05 RX ADMIN — Medication 650 MILLIGRAM(S): at 22:15

## 2018-09-05 NOTE — PROGRESS NOTE ADULT - SUBJECTIVE AND OBJECTIVE BOX
no palpitations, no syncope, no dyspnea, still having atypical chest pain      MEDICATIONS  (STANDING):  aspirin  chewable 81 milliGRAM(s) Oral daily  atorvastatin 40 milliGRAM(s) Oral at bedtime  furosemide   Injectable 40 milliGRAM(s) IV Push daily  heparin  Injectable 5000 Unit(s) SubCutaneous every 8 hours  insulin glargine Injectable (LANTUS) 20 Unit(s) SubCutaneous every morning  insulin glargine Injectable (LANTUS) 60 Unit(s) SubCutaneous at bedtime  insulin lispro (HumaLOG) corrective regimen sliding scale   SubCutaneous three times a day with meals  insulin lispro (HumaLOG) corrective regimen sliding scale   SubCutaneous at bedtime  levothyroxine 50 MICROGram(s) Oral daily  metoprolol tartrate 12.5 milliGRAM(s) Oral two times a day  ticagrelor 90 milliGRAM(s) Oral two times a day    MEDICATIONS  (PRN):  aluminum hydroxide/magnesium hydroxide/simethicone Suspension 30 milliLiter(s) Oral every 4 hours PRN Dyspepsia      LABS:                        12.5   10.46 )-----------( 288      ( 05 Sep 2018 07:00 )             37.9     139  |  102  |  40<H>  ----------------------------<  82  4.0   |  20<L>  |  1.63<H>    Ca    10.3      05 Sep 2018 07:00  Mg     2.4     09-05    Creatinine Trend: 1.63<--, 1.73<--, 1.42<--, 1.60<--, 1.49<--, 1.67<--     CARDIAC MARKERS ( 04 Sep 2018 06:40 )  x     / x     / 135 u/L / 3.90 ng/mL / x        PHYSICAL EXAM  Vital Signs Last 24 Hrs  T(C): 36.4 (05 Sep 2018 05:38), Max: 36.4 (04 Sep 2018 22:42)  T(F): 97.6 (05 Sep 2018 05:38), Max: 97.6 (04 Sep 2018 22:42)  HR: 71 (05 Sep 2018 05:38) (71 - 85)  BP: 108/62 (05 Sep 2018 05:38) (105/60 - 122/62)  RR: 16 (05 Sep 2018 05:38) (16 - 16)  SpO2: 100% (05 Sep 2018 05:38) (96% - 100%)    no JVD  RRR, no murmurs  Bibasilar crackles  soft nt/nd  no c/c/e      < from: TTE with Doppler (w/Cont) (01.23.18 @ 12:31) >  CONCLUSIONS:  1. Mitral annular calcification, otherwise normal mitral  valve. Mild-moderate mitral regurgitation.  2. Calcified trileaflet aortic valve with normal opening.  Mild aortic regurgitation.  3. Normal left ventricular internal dimensions and wall  thicknesses.  4. Endocardium not well visualized; grossly severe global  left ventricular systolic dysfunction.  Endocardial  visualization enhanced with intravenous injection of echo  contrast (Definity).  5. The right ventricle is not well visualized; grossly  normal right ventricular systolic function.  *** Compared with echocardiogram of 11/25/2017, no  significant changes noted.  ------------------------------------------------------------------------  Confirmed on  1/23/2018 - 14:35:49 by Jose Lucia M.D.    < end of copied text >      ASSESSMENT/PLAN: 71 y/o Female PMH HTN, mild CKD, Hyperlipidemia, DM-II, CAD s/p 3V CABG  (LIMA to LAD, SVG to OM, SVG to PDA) at [facility] 2014, s/p CORNELIA TO RI 11/17/17, NSTEMI 12/2017 s/p LHC on 12/5 and CORNELIA to dRamus, TTE at that time revealed grossly moderate to severe LV dysfunction with hypokinesis of the inferior, lateral and inferolateral walls with an EF of 36% s/p LHC on 2/6  with no new OBS CAD, managed medically, admitted to MICU with SOB, Cough, Pulm edema, acute on chronic systolic HF and ?PNA. C/O chest pressure and positive troponin. She has a KNOWN  of the RCA fed by LAD collaterals. This is likely the culprit of her mild EKG changes and mild troponin elevation.     --Cont to trend CE incl CPK and Trop T  --IV Lasix to keep O>I  --Abx per primary team  --Repeat TTE  --Would trend troponin including CPKs/CPKMB and get repeat echo to assure she does not need an ICD.   --if repeat echo shows that LVEF < 35% then would repeat cath and then implant ICD if no revascularization performed

## 2018-09-05 NOTE — PROGRESS NOTE ADULT - ASSESSMENT
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 Patient was hypoxic got admitted to MICU being treated for CHF and pneumonia

## 2018-09-05 NOTE — PROGRESS NOTE ADULT - SUBJECTIVE AND OBJECTIVE BOX
[facility] [place] [facility]   MD RAHEEL SHAH MD RUORU WONG, PA    TEL:  OFFICE: [phone]  DR SANTOYO CELL: [phone]  JUSTEN KENDALL CELL: [phone]  DR. 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 Patient was hypoxic got admitted to MICU being treated for CHF now transferred to floor    PAST HISTORY  --------------------------------------------------------------------------------  No significant changes to PMH, PSH, FHx, SHx, unless otherwise noted    ALLERGIES & MEDICATIONS  --------------------------------------------------------------------------------  Allergies    No Known Allergies    Intolerances      Standing Inpatient Medications  aspirin  chewable 81 milliGRAM(s) Oral daily  atorvastatin 40 milliGRAM(s) Oral at bedtime  furosemide   Injectable 40 milliGRAM(s) IV Push daily  heparin  Injectable 5000 Unit(s) SubCutaneous every 8 hours  insulin glargine Injectable (LANTUS) 20 Unit(s) SubCutaneous every morning  insulin glargine Injectable (LANTUS) 60 Unit(s) SubCutaneous at bedtime  insulin lispro (HumaLOG) corrective regimen sliding scale   SubCutaneous three times a day with meals  insulin lispro (HumaLOG) corrective regimen sliding scale   SubCutaneous at bedtime  levothyroxine 50 MICROGram(s) Oral daily  metoprolol tartrate 12.5 milliGRAM(s) Oral two times a day  ticagrelor 90 milliGRAM(s) Oral two times a day    PRN Inpatient Medications  acetaminophen   Tablet .. 650 milliGRAM(s) Oral every 6 hours PRN  aluminum hydroxide/magnesium hydroxide/simethicone Suspension 30 milliLiter(s) Oral every 4 hours PRN      REVIEW OF SYSTEMS  --------------------------------------------------------------------------------  General: no fever  CVS: no chest pain  RESP: + sob, no cough  ABD: no abdominal pain  : no dysuria,  MSK: no edema     VITALS/PHYSICAL EXAM  --------------------------------------------------------------------------------  T(C): 36.5 (09-05-18 @ 13:58), Max: 36.5 (09-05-18 @ 13:58)  HR: 84 (09-05-18 @ 13:58) (71 - 85)  BP: 116/66 (09-05-18 @ 13:58) (108/62 - 122/62)  RR: 16 (09-05-18 @ 13:58) (16 - 16)  SpO2: 99% (09-05-18 @ 13:58) (96% - 100%)  Wt(kg): --        09-04-18 @ 07:01  -  09-05-18 @ 07:00  --------------------------------------------------------  IN: 400 mL / OUT: 1100 mL / NET: -700 mL    09-05-18 @ 07:01  -  09-05-18 @ 15:17  --------------------------------------------------------  IN: 500 mL / OUT: 800 mL / NET: -300 mL      Physical Exam:  	Gen: NAD  	HEENT: MONICA  	Pulm: CTA B/L  	CV: S1S2  	Abd: Soft, +BS  	Ext: No LE edema B/L                      Neuro: Awake   	Skin: Warm and Dry   	Gu no polo    LABS/STUDIES  --------------------------------------------------------------------------------              12.5   10.46 >-----------<  288      [09-05-18 @ 07:00]              37.9     139  |  102  |  40  ----------------------------<  82      [09-05-18 @ 07:00]  4.0   |  20  |  1.63        Ca     10.3     [09-05-18 @ 07:00]      Mg     2.4     [09-05-18 @ 07:00]                [09-04-18 @ 06:40]    Creatinine Trend:  SCr 1.63 [09-05 @ 07:00]  SCr 1.73 [09-04 @ 06:40]  SCr 1.42 [09-03 @ 03:40]  SCr 1.60 [09-02 @ 03:45]  SCr 1.49 [09-02 @ 02:30]    Urinalysis - [09-01-18 @ 04:29]      Color YELLOW / Appearance CLEAR / SG 1.015 / pH 6.5      Gluc 250 / Ketone NEGATIVE  / Bili NEGATIVE / Urobili NORMAL       Blood MODERATE / Protein MODERATE / Leuk Est NEGATIVE / Nitrite NEGATIVE      RBC 5-10 / WBC 2-5 / Hyaline  / Gran  / Sq Epi OCC. / Non Sq Epi  / Bacteria       Iron 40, TIBC 377, %sat --      [01-20-18 @ 06:06]  Ferritin 38.35      [01-20-18 @ 06:06]  HbA1c 7.4      [09-02-18 @ 02:30]  TSH 0.55      [01-19-18 @ 06:45]  Lipid: chol 130, , HDL 30, LDL 70      [01-19-18 @ 06:45]

## 2018-09-05 NOTE — PROGRESS NOTE ADULT - PROBLEM SELECTOR PLAN 1
Pt with CKD sec to long standing DM/HTN and CHF.   Baseline SCr 1.5-1.8  Renal function fluctuates sec to CHF, however relatively stable   Repeat ECHO as per Cardio-- if low EF may need Cath  Pt at 26.1% risk of Contrast Induced Nephropathy and 1.09 % risk of needing dialysis   Will need to optimize patient prior to procedure   Monitor renal function and electrolyte.   avoid nephrotoxics, NSAIDS RCA.

## 2018-09-06 LAB
BACTERIA BLD CULT: SIGNIFICANT CHANGE UP
BACTERIA BLD CULT: SIGNIFICANT CHANGE UP
BUN SERPL-MCNC: 45 MG/DL — HIGH (ref 7–23)
CALCIUM SERPL-MCNC: 10.5 MG/DL — SIGNIFICANT CHANGE UP (ref 8.4–10.5)
CHLORIDE SERPL-SCNC: 101 MMOL/L — SIGNIFICANT CHANGE UP (ref 98–107)
CK MB BLD-MCNC: 3.83 NG/ML — SIGNIFICANT CHANGE UP (ref 1–4.7)
CK MB BLD-MCNC: SIGNIFICANT CHANGE UP (ref 0–2.5)
CK SERPL-CCNC: 115 U/L — SIGNIFICANT CHANGE UP (ref 25–170)
CO2 SERPL-SCNC: 18 MMOL/L — LOW (ref 22–31)
CREAT SERPL-MCNC: 1.71 MG/DL — HIGH (ref 0.5–1.3)
GLUCOSE SERPL-MCNC: 130 MG/DL — HIGH (ref 70–99)
HCT VFR BLD CALC: 37.5 % — SIGNIFICANT CHANGE UP (ref 34.5–45)
HGB BLD-MCNC: 12.3 G/DL — SIGNIFICANT CHANGE UP (ref 11.5–15.5)
MAGNESIUM SERPL-MCNC: 2.3 MG/DL — SIGNIFICANT CHANGE UP (ref 1.6–2.6)
MCHC RBC-ENTMCNC: 29.7 PG — SIGNIFICANT CHANGE UP (ref 27–34)
MCHC RBC-ENTMCNC: 32.8 % — SIGNIFICANT CHANGE UP (ref 32–36)
MCV RBC AUTO: 90.6 FL — SIGNIFICANT CHANGE UP (ref 80–100)
NRBC # FLD: 0 — SIGNIFICANT CHANGE UP
PLATELET # BLD AUTO: 307 K/UL — SIGNIFICANT CHANGE UP (ref 150–400)
PMV BLD: 9 FL — SIGNIFICANT CHANGE UP (ref 7–13)
POTASSIUM SERPL-MCNC: 4.2 MMOL/L — SIGNIFICANT CHANGE UP (ref 3.5–5.3)
POTASSIUM SERPL-SCNC: 4.2 MMOL/L — SIGNIFICANT CHANGE UP (ref 3.5–5.3)
RBC # BLD: 4.14 M/UL — SIGNIFICANT CHANGE UP (ref 3.8–5.2)
RBC # FLD: 13.7 % — SIGNIFICANT CHANGE UP (ref 10.3–14.5)
SODIUM SERPL-SCNC: 138 MMOL/L — SIGNIFICANT CHANGE UP (ref 135–145)
TROPONIN T, HIGH SENSITIVITY: 39 NG/L — SIGNIFICANT CHANGE UP (ref ?–14)
WBC # BLD: 9.51 K/UL — SIGNIFICANT CHANGE UP (ref 3.8–10.5)
WBC # FLD AUTO: 9.51 K/UL — SIGNIFICANT CHANGE UP (ref 3.8–10.5)

## 2018-09-06 PROCEDURE — 93306 TTE W/DOPPLER COMPLETE: CPT | Mod: 26

## 2018-09-06 RX ORDER — FUROSEMIDE 40 MG
40 TABLET ORAL DAILY
Qty: 0 | Refills: 0 | Status: DISCONTINUED | OUTPATIENT
Start: 2018-09-07 | End: 2018-09-07

## 2018-09-06 RX ORDER — TRAMADOL HYDROCHLORIDE 50 MG/1
25 TABLET ORAL ONCE
Qty: 0 | Refills: 0 | Status: DISCONTINUED | OUTPATIENT
Start: 2018-09-06 | End: 2018-09-06

## 2018-09-06 RX ADMIN — Medication 12.5 MILLIGRAM(S): at 05:30

## 2018-09-06 RX ADMIN — Medication 2: at 09:19

## 2018-09-06 RX ADMIN — Medication 40 MILLIGRAM(S): at 05:30

## 2018-09-06 RX ADMIN — TRAMADOL HYDROCHLORIDE 25 MILLIGRAM(S): 50 TABLET ORAL at 23:00

## 2018-09-06 RX ADMIN — Medication 50 MICROGRAM(S): at 05:30

## 2018-09-06 RX ADMIN — ATORVASTATIN CALCIUM 40 MILLIGRAM(S): 80 TABLET, FILM COATED ORAL at 22:00

## 2018-09-06 RX ADMIN — HEPARIN SODIUM 5000 UNIT(S): 5000 INJECTION INTRAVENOUS; SUBCUTANEOUS at 14:11

## 2018-09-06 RX ADMIN — TICAGRELOR 90 MILLIGRAM(S): 90 TABLET ORAL at 18:37

## 2018-09-06 RX ADMIN — Medication 8: at 18:30

## 2018-09-06 RX ADMIN — Medication 6: at 14:03

## 2018-09-06 RX ADMIN — TICAGRELOR 90 MILLIGRAM(S): 90 TABLET ORAL at 05:30

## 2018-09-06 RX ADMIN — Medication 12.5 MILLIGRAM(S): at 18:37

## 2018-09-06 RX ADMIN — INSULIN GLARGINE 20 UNIT(S): 100 INJECTION, SOLUTION SUBCUTANEOUS at 09:19

## 2018-09-06 RX ADMIN — INSULIN GLARGINE 60 UNIT(S): 100 INJECTION, SOLUTION SUBCUTANEOUS at 22:00

## 2018-09-06 RX ADMIN — Medication 6: at 22:00

## 2018-09-06 RX ADMIN — Medication 81 MILLIGRAM(S): at 14:04

## 2018-09-06 RX ADMIN — HEPARIN SODIUM 5000 UNIT(S): 5000 INJECTION INTRAVENOUS; SUBCUTANEOUS at 22:00

## 2018-09-06 RX ADMIN — TRAMADOL HYDROCHLORIDE 25 MILLIGRAM(S): 50 TABLET ORAL at 22:12

## 2018-09-06 NOTE — PROGRESS NOTE ADULT - SUBJECTIVE AND OBJECTIVE BOX
no palpitations, no syncope, no dyspnea  c/o non specific left foot pain      MEDICATIONS  (STANDING):  aspirin  chewable 81 milliGRAM(s) Oral daily  atorvastatin 40 milliGRAM(s) Oral at bedtime  furosemide   Injectable 40 milliGRAM(s) IV Push daily  heparin  Injectable 5000 Unit(s) SubCutaneous every 8 hours  insulin glargine Injectable (LANTUS) 20 Unit(s) SubCutaneous every morning  insulin glargine Injectable (LANTUS) 60 Unit(s) SubCutaneous at bedtime  insulin lispro (HumaLOG) corrective regimen sliding scale   SubCutaneous three times a day with meals  insulin lispro (HumaLOG) corrective regimen sliding scale   SubCutaneous at bedtime  levothyroxine 50 MICROGram(s) Oral daily  metoprolol tartrate 12.5 milliGRAM(s) Oral two times a day  ticagrelor 90 milliGRAM(s) Oral two times a day    MEDICATIONS  (PRN):  acetaminophen   Tablet .. 650 milliGRAM(s) Oral every 6 hours PRN Mild Pain (1 - 3), Moderate Pain (4 - 6)  aluminum hydroxide/magnesium hydroxide/simethicone Suspension 30 milliLiter(s) Oral every 4 hours PRN Dyspepsia      LABS:                        12.3   9.51  )-----------( 307      ( 06 Sep 2018 06:47 )             37.5     Hemoglobin: 12.3 g/dL (09-06 @ 06:47)  Hemoglobin: 12.5 g/dL (09-05 @ 07:00)  Hemoglobin: 11.6 g/dL (09-03 @ 03:40)  Hemoglobin: 11.5 g/dL (09-02 @ 03:45)  Hemoglobin: 11.1 g/dL (09-02 @ 02:30)    09-06    138  |  101  |  45<H>  ----------------------------<  130<H>  4.2   |  18<L>  |  1.71<H>    Ca    10.5      06 Sep 2018 06:47  Mg     2.3     09-06      Creatinine Trend: 1.71<--, 1.63<--, 1.73<--, 1.42<--, 1.60<--, 1.49<--     CARDIAC MARKERS ( 06 Sep 2018 06:47 )  x     / x     / 115 u/L / 3.83 ng/mL / x      CARDIAC MARKERS ( 04 Sep 2018 06:40 )  x     / x     / 135 u/L / 3.90 ng/mL / x            PHYSICAL EXAM  Vital Signs Last 24 Hrs  T(C): 36.3 (06 Sep 2018 05:27), Max: 36.5 (05 Sep 2018 13:58)  T(F): 97.4 (06 Sep 2018 05:27), Max: 97.7 (05 Sep 2018 13:58)  HR: 73 (06 Sep 2018 05:27) (72 - 92)  BP: 126/70 (06 Sep 2018 05:27) (111/53 - 139/81)  BP(mean): --  RR: 16 (06 Sep 2018 05:27) (16 - 16)  SpO2: 100% (06 Sep 2018 05:27) (98% - 100%)      no JVD  RRR, no murmurs  good inspiratory effort with no crackles or rales  soft nt/nd  no c/c/e      < from: TTE with Doppler (w/Cont) (01.23.18 @ 12:31) >  CONCLUSIONS:  1. Mitral annular calcification, otherwise normal mitral  valve. Mild-moderate mitral regurgitation.  2. Calcified trileaflet aortic valve with normal opening.  Mild aortic regurgitation.  3. Normal left ventricular internal dimensions and wall  thicknesses.  4. Endocardium not well visualized; grossly severe global  left ventricular systolic dysfunction.  Endocardial  visualization enhanced with intravenous injection of echo  contrast (Definity).  5. The right ventricle is not well visualized; grossly  normal right ventricular systolic function.  *** Compared with echocardiogram of 11/25/2017, no  significant changes noted.  ------------------------------------------------------------------------  Confirmed on  1/23/2018 - 14:35:49 by Jose Lucia M.D.    < from: Xray Chest 1 View-PORTABLE IMMEDIATE (09.01.18 @ 02:18) >  IMPRESSION:   Bilateral patchy airspace opacity, possibly pulmonary edema or multifocal   pneumonia.    Culture - Blood (09.01.18 @ 04:45)    Culture - Blood:   NO ORGANISMS ISOLATED    Specimen Source: BLOOD VENOUS            ASSESSMENT/PLAN: 73 y/o Female PMH HTN, mild CKD, Hyperlipidemia, DM-II, CAD s/p 3V CABG  (LIMA to LAD, SVG to OM, SVG to PDA) at [facility] 2014, s/p CORNELIA TO RI 11/17/17, NSTEMI 12/2017 s/p LHC on 12/5 and CORNELIA to dRamus, TTE at that time revealed grossly moderate to severe LV dysfunction with hypokinesis of the inferior, lateral and inferolateral walls with an EF of 36% s/p C on 2/6  with no new OBS CAD, managed medically, admitted to MICU with SOB, Cough, Pulm edema, acute on chronic systolic HF and ?PNA. C/O chest pressure and positive troponin. She has a KNOWN  of the RCA fed by LAD collaterals. This is likely the culprit of her mild EKG changes and mild troponin elevation.     -- CPK remains negative and troponin is down trending   -- Volume status appears improved - would transition to PO Lasix tomorrow  -- Nephrology followup pending for mild MERVIN on CKD  -- was on abx for possible pna - completed 5 day course of Azithro/Rocephin  --Repeat TTE pending   --Would trend troponin including CPKs/CPKMB and get repeat echo to assure she does not need an ICD.   --if repeat echo shows that LVEF < 35% then would repeat cath and then implant ICD if no revascularization performed

## 2018-09-06 NOTE — PROGRESS NOTE ADULT - ASSESSMENT
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 Patient was hypoxic got admitted to MICU being treated for CHF and pneumonia

## 2018-09-06 NOTE — PROGRESS NOTE ADULT - SUBJECTIVE AND OBJECTIVE BOX
[facility] [place] [facility]   MD RAHEEL SHAH MD RUORU WONG, PA    TEL:  OFFICE: [phone]  DR SANTOYO CELL: [phone]  JUSTEN KENDALL CELL: [phone]  DR. 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 Patient was hypoxic got admitted to MICU being treated for CHF now transferred to floor    PAST HISTORY  --------------------------------------------------------------------------------  No significant changes to PMH, PSH, FHx, SHx, unless otherwise noted    ALLERGIES & MEDICATIONS  --------------------------------------------------------------------------------  Allergies    No Known Allergies    Intolerances      Standing Inpatient Medications  aspirin  chewable 81 milliGRAM(s) Oral daily  atorvastatin 40 milliGRAM(s) Oral at bedtime  heparin  Injectable 5000 Unit(s) SubCutaneous every 8 hours  insulin glargine Injectable (LANTUS) 20 Unit(s) SubCutaneous every morning  insulin glargine Injectable (LANTUS) 60 Unit(s) SubCutaneous at bedtime  insulin lispro (HumaLOG) corrective regimen sliding scale   SubCutaneous three times a day with meals  insulin lispro (HumaLOG) corrective regimen sliding scale   SubCutaneous at bedtime  levothyroxine 50 MICROGram(s) Oral daily  metoprolol tartrate 12.5 milliGRAM(s) Oral two times a day  ticagrelor 90 milliGRAM(s) Oral two times a day    PRN Inpatient Medications  acetaminophen   Tablet .. 650 milliGRAM(s) Oral every 6 hours PRN  aluminum hydroxide/magnesium hydroxide/simethicone Suspension 30 milliLiter(s) Oral every 4 hours PRN      REVIEW OF SYSTEMS  --------------------------------------------------------------------------------  General: no fever  CVS: no chest pain  RESP: + intermittent  sob, no cough  ABD: no abdominal pain  : no dysuria,  MSK: no edema , + foot pain    VITALS/PHYSICAL EXAM  --------------------------------------------------------------------------------  T(C): 36.4 (09-06-18 @ 13:38), Max: 36.5 (09-05-18 @ 18:20)  HR: 79 (09-06-18 @ 13:38) (72 - 92)  BP: 113/63 (09-06-18 @ 13:38) (111/53 - 139/81)  RR: 16 (09-06-18 @ 13:38) (16 - 16)  SpO2: 100% (09-06-18 @ 13:38) (98% - 100%)  Wt(kg): --        09-05-18 @ 07:01  -  09-06-18 @ 07:00  --------------------------------------------------------  IN: 500 mL / OUT: 800 mL / NET: -300 mL    09-06-18 @ 07:01  -  09-06-18 @ 14:39  --------------------------------------------------------  IN: 360 mL / OUT: 0 mL / NET: 360 mL      Physical Exam:  	Gen: NAD  	HEENT: MMM  	Pulm: CTA B/L  	CV: S1S2  	Abd: Soft, +BS  	Ext: No LE edema B/L                      Neuro: Awake   	Skin: Warm and Dry   	Gu no polo    LABS/STUDIES  --------------------------------------------------------------------------------              12.3   9.51  >-----------<  307      [09-06-18 @ 06:47]              37.5     138  |  101  |  45  ----------------------------<  130      [09-06-18 @ 06:47]  4.2   |  18  |  1.71        Ca     10.5     [09-06-18 @ 06:47]      Mg     2.3     [09-06-18 @ 06:47]                [09-06-18 @ 06:47]    Creatinine Trend:  SCr 1.71 [09-06 @ 06:47]  SCr 1.63 [09-05 @ 07:00]  SCr 1.73 [09-04 @ 06:40]  SCr 1.42 [09-03 @ 03:40]  SCr 1.60 [09-02 @ 03:45]    Urinalysis - [09-01-18 @ 04:29]      Color YELLOW / Appearance CLEAR / SG 1.015 / pH 6.5      Gluc 250 / Ketone NEGATIVE  / Bili NEGATIVE / Urobili NORMAL       Blood MODERATE / Protein MODERATE / Leuk Est NEGATIVE / Nitrite NEGATIVE      RBC 5-10 / WBC 2-5 / Hyaline  / Gran  / Sq Epi OCC. / Non Sq Epi  / Bacteria       Iron 40, TIBC 377, %sat --      [01-20-18 @ 06:06]  Ferritin 38.35      [01-20-18 @ 06:06]  HbA1c 7.4      [09-02-18 @ 02:30]  TSH 0.55      [01-19-18 @ 06:45]  Lipid: chol 130, , HDL 30, LDL 70      [01-19-18 @ 06:45]

## 2018-09-07 DIAGNOSIS — E87.1 HYPO-OSMOLALITY AND HYPONATREMIA: ICD-10-CM

## 2018-09-07 DIAGNOSIS — E83.52 HYPERCALCEMIA: ICD-10-CM

## 2018-09-07 LAB
BASOPHILS # BLD AUTO: 0.06 K/UL — SIGNIFICANT CHANGE UP (ref 0–0.2)
BASOPHILS NFR BLD AUTO: 0.7 % — SIGNIFICANT CHANGE UP (ref 0–2)
BUN SERPL-MCNC: 51 MG/DL — HIGH (ref 7–23)
CALCIUM SERPL-MCNC: 10.6 MG/DL — HIGH (ref 8.4–10.5)
CHLORIDE SERPL-SCNC: 95 MMOL/L — LOW (ref 98–107)
CK SERPL-CCNC: 95 U/L — SIGNIFICANT CHANGE UP (ref 25–170)
CO2 SERPL-SCNC: 17 MMOL/L — LOW (ref 22–31)
CREAT SERPL-MCNC: 1.94 MG/DL — HIGH (ref 0.5–1.3)
EOSINOPHIL # BLD AUTO: 0.54 K/UL — HIGH (ref 0–0.5)
EOSINOPHIL NFR BLD AUTO: 6.2 % — HIGH (ref 0–6)
GLUCOSE SERPL-MCNC: 388 MG/DL — HIGH (ref 70–99)
HCT VFR BLD CALC: 36.7 % — SIGNIFICANT CHANGE UP (ref 34.5–45)
HGB BLD-MCNC: 12.3 G/DL — SIGNIFICANT CHANGE UP (ref 11.5–15.5)
IMM GRANULOCYTES # BLD AUTO: 0.24 # — SIGNIFICANT CHANGE UP
IMM GRANULOCYTES NFR BLD AUTO: 2.7 % — HIGH (ref 0–1.5)
LYMPHOCYTES # BLD AUTO: 1.96 K/UL — SIGNIFICANT CHANGE UP (ref 1–3.3)
LYMPHOCYTES # BLD AUTO: 22.4 % — SIGNIFICANT CHANGE UP (ref 13–44)
MAGNESIUM SERPL-MCNC: 2.1 MG/DL — SIGNIFICANT CHANGE UP (ref 1.6–2.6)
MCHC RBC-ENTMCNC: 29.9 PG — SIGNIFICANT CHANGE UP (ref 27–34)
MCHC RBC-ENTMCNC: 33.5 % — SIGNIFICANT CHANGE UP (ref 32–36)
MCV RBC AUTO: 89.3 FL — SIGNIFICANT CHANGE UP (ref 80–100)
MONOCYTES # BLD AUTO: 0.69 K/UL — SIGNIFICANT CHANGE UP (ref 0–0.9)
MONOCYTES NFR BLD AUTO: 7.9 % — SIGNIFICANT CHANGE UP (ref 2–14)
NEUTROPHILS # BLD AUTO: 5.25 K/UL — SIGNIFICANT CHANGE UP (ref 1.8–7.4)
NEUTROPHILS NFR BLD AUTO: 60.1 % — SIGNIFICANT CHANGE UP (ref 43–77)
NRBC # FLD: 0 — SIGNIFICANT CHANGE UP
PLATELET # BLD AUTO: 305 K/UL — SIGNIFICANT CHANGE UP (ref 150–400)
PMV BLD: 9.2 FL — SIGNIFICANT CHANGE UP (ref 7–13)
POTASSIUM SERPL-MCNC: 4.6 MMOL/L — SIGNIFICANT CHANGE UP (ref 3.5–5.3)
POTASSIUM SERPL-SCNC: 4.6 MMOL/L — SIGNIFICANT CHANGE UP (ref 3.5–5.3)
RBC # BLD: 4.11 M/UL — SIGNIFICANT CHANGE UP (ref 3.8–5.2)
RBC # FLD: 13.7 % — SIGNIFICANT CHANGE UP (ref 10.3–14.5)
SODIUM SERPL-SCNC: 131 MMOL/L — LOW (ref 135–145)
TROPONIN T, HIGH SENSITIVITY: 41 NG/L — SIGNIFICANT CHANGE UP (ref ?–14)
WBC # BLD: 8.74 K/UL — SIGNIFICANT CHANGE UP (ref 3.8–10.5)
WBC # FLD AUTO: 8.74 K/UL — SIGNIFICANT CHANGE UP (ref 3.8–10.5)

## 2018-09-07 RX ORDER — TRAMADOL HYDROCHLORIDE 50 MG/1
25 TABLET ORAL ONCE
Qty: 0 | Refills: 0 | Status: DISCONTINUED | OUTPATIENT
Start: 2018-09-07 | End: 2018-09-07

## 2018-09-07 RX ADMIN — Medication 8: at 17:35

## 2018-09-07 RX ADMIN — Medication 12.5 MILLIGRAM(S): at 17:35

## 2018-09-07 RX ADMIN — ATORVASTATIN CALCIUM 40 MILLIGRAM(S): 80 TABLET, FILM COATED ORAL at 21:24

## 2018-09-07 RX ADMIN — Medication 4: at 09:12

## 2018-09-07 RX ADMIN — HEPARIN SODIUM 5000 UNIT(S): 5000 INJECTION INTRAVENOUS; SUBCUTANEOUS at 21:24

## 2018-09-07 RX ADMIN — TICAGRELOR 90 MILLIGRAM(S): 90 TABLET ORAL at 17:35

## 2018-09-07 RX ADMIN — Medication 40 MILLIGRAM(S): at 05:50

## 2018-09-07 RX ADMIN — TRAMADOL HYDROCHLORIDE 25 MILLIGRAM(S): 50 TABLET ORAL at 11:45

## 2018-09-07 RX ADMIN — HEPARIN SODIUM 5000 UNIT(S): 5000 INJECTION INTRAVENOUS; SUBCUTANEOUS at 05:50

## 2018-09-07 RX ADMIN — TICAGRELOR 90 MILLIGRAM(S): 90 TABLET ORAL at 05:49

## 2018-09-07 RX ADMIN — Medication 12.5 MILLIGRAM(S): at 05:49

## 2018-09-07 RX ADMIN — Medication 81 MILLIGRAM(S): at 09:13

## 2018-09-07 RX ADMIN — INSULIN GLARGINE 20 UNIT(S): 100 INJECTION, SOLUTION SUBCUTANEOUS at 09:12

## 2018-09-07 RX ADMIN — TRAMADOL HYDROCHLORIDE 25 MILLIGRAM(S): 50 TABLET ORAL at 10:58

## 2018-09-07 RX ADMIN — Medication 12: at 12:56

## 2018-09-07 RX ADMIN — Medication 50 MICROGRAM(S): at 05:50

## 2018-09-07 RX ADMIN — Medication 6: at 21:59

## 2018-09-07 RX ADMIN — INSULIN GLARGINE 60 UNIT(S): 100 INJECTION, SOLUTION SUBCUTANEOUS at 22:02

## 2018-09-07 NOTE — PROGRESS NOTE ADULT - SUBJECTIVE AND OBJECTIVE BOX
no palpitations, no syncope, no dyspnea  c/o non specific left foot pain        MEDICATIONS  (STANDING):  aspirin  chewable 81 milliGRAM(s) Oral daily  atorvastatin 40 milliGRAM(s) Oral at bedtime  furosemide    Tablet 40 milliGRAM(s) Oral daily  heparin  Injectable 5000 Unit(s) SubCutaneous every 8 hours  insulin glargine Injectable (LANTUS) 20 Unit(s) SubCutaneous every morning  insulin glargine Injectable (LANTUS) 60 Unit(s) SubCutaneous at bedtime  insulin lispro (HumaLOG) corrective regimen sliding scale   SubCutaneous three times a day with meals  insulin lispro (HumaLOG) corrective regimen sliding scale   SubCutaneous at bedtime  levothyroxine 50 MICROGram(s) Oral daily  metoprolol tartrate 12.5 milliGRAM(s) Oral two times a day  ticagrelor 90 milliGRAM(s) Oral two times a day    MEDICATIONS  (PRN):  acetaminophen   Tablet .. 650 milliGRAM(s) Oral every 6 hours PRN Mild Pain (1 - 3), Moderate Pain (4 - 6)  aluminum hydroxide/magnesium hydroxide/simethicone Suspension 30 milliLiter(s) Oral every 4 hours PRN Dyspepsia      LABS:                        12.3   9.51  )-----------( 307      ( 06 Sep 2018 06:47 )             37.5     Hemoglobin: 12.3 g/dL (09-06 @ 06:47)  Hemoglobin: 12.5 g/dL (09-05 @ 07:00)  Hemoglobin: 11.6 g/dL (09-03 @ 03:40)    09-06    138  |  101  |  45<H>  ----------------------------<  130<H>  4.2   |  18<L>  |  1.71<H>    Ca    10.5      06 Sep 2018 06:47  Mg     2.3     09-06      Creatinine Trend: 1.71<--, 1.63<--, 1.73<--, 1.42<--, 1.60<--, 1.49<--     CARDIAC MARKERS ( 06 Sep 2018 06:47 )  x     / x     / 115 u/L / 3.83 ng/mL / x            PHYSICAL EXAM  Vital Signs Last 24 Hrs  T(C): 36.6 (07 Sep 2018 05:47), Max: 36.6 (06 Sep 2018 18:35)  T(F): 97.8 (07 Sep 2018 05:47), Max: 97.9 (06 Sep 2018 22:03)  HR: 75 (07 Sep 2018 05:47) (75 - 86)  BP: 119/68 (07 Sep 2018 05:47) (113/63 - 123/63)  BP(mean): --  RR: 18 (07 Sep 2018 05:47) (16 - 18)  SpO2: 100% (07 Sep 2018 05:47) (100% - 100%)    no JVD  RRR, no murmurs  good inspiratory effort with no crackles or rales  soft nt/nd  no c/c/e- left foot warm dry no evidence of gouty flare or infection +pulses      < from: TTE with Doppler (w/Cont) (01.23.18 @ 12:31) >  CONCLUSIONS:  1. Mitral annular calcification, otherwise normal mitral  valve. Mild-moderate mitral regurgitation.  2. Calcified trileaflet aortic valve with normal opening.  Mild aortic regurgitation.  3. Normal left ventricular internal dimensions and wall  thicknesses.  4. Endocardium not well visualized; grossly severe global  left ventricular systolic dysfunction.  Endocardial  visualization enhanced with intravenous injection of echo  contrast (Definity).  5. The right ventricle is not well visualized; grossly  normal right ventricular systolic function.  *** Compared with echocardiogram of 11/25/2017, no  significant changes noted.  ------------------------------------------------------------------------  Confirmed on  1/23/2018 - 14:35:49 by Jose Lucia M.D.    < from: Xray Chest 1 View-PORTABLE IMMEDIATE (09.01.18 @ 02:18) >  IMPRESSION:   Bilateral patchy airspace opacity, possibly pulmonary edema or multifocal   pneumonia.    Culture - Blood (09.01.18 @ 04:45)    Culture - Blood:   NO ORGANISMS ISOLATED    Specimen Source: BLOOD VENOUS      < from: Xray Foot AP + Lateral, Left (09.05.18 @ 17:56) >  IMPRESSION:  No tracking soft tissue gas collections, radiopaque foreign bodies, or   gross radiographic evidence for osteomyelitis.    No fractures or dislocations.    Tarsometatarsal alignment maintained without evidence for a Lisfranc  injury.    Preserved visualized joint spaces. No joint margin erosions or   intra-articular or periarticular calcifications.    No lytic or blastic lesions.    Scant faint vascular calcifications.    < end of copied text >    < from: TTE with Doppler (w/Cont) (09.06.18 @ 15:21) >  CONCLUSIONS: EF 37%  1. Mitral annular calcification, otherwise normal mitral  valve. Mild mitral regurgitation.  2. Normal left ventricular internal dimensions and wall  thicknesses.  3. Endocardium not well visualized; grossly moderate to  severe global left ventricular systolic dysfunction.  The  inferolateral wall appears particularly hypokinetic.  Endocardial visualization enhanced with intravenous  injection of echo contrast (Definity).  4. The right ventricle is not well visualized; grossly  normal right ventricular systolic function.    < end of copied text >      ASSESSMENT/PLAN: 73 y/o Female PMH HTN, mild CKD, Hyperlipidemia, DM-II, CAD s/p 3V CABG  (LIMA to LAD, SVG to OM, SVG to PDA) at [facility] 2014, s/p CORNELIA TO [place] 11/17/17, NSTEMI 12/2017 s/p C on 12/5 and CORNELIA to [place], TTE at that time revealed grossly moderate to severe LV dysfunction with hypokinesis of the inferior, lateral and inferolateral walls with an EF of 36% s/p C on 2/6  with no new OBS CAD, managed medically, admitted to MICU with SOB, Cough, Pulm edema, acute on chronic systolic HF and ?PNA. C/O chest pressure and positive troponin. She has a KNOWN  of the RCA fed by LAD collaterals. This is likely the culprit of her mild EKG changes and mild troponin elevation.     --f/u AM labs, especially creatinine and CPK/troponin  -- she is euvolemic - c/w Lasix PO if creatinine stable    -- Nephrology followup pending for mild MERVIN on CKD  -- was on abx for possible pna - completed 5 day course of Azithro/Rocephin  --Repeat TTE noted - EF 37% - no need for ICD      - c/w BB. no ACE-I 2/2 CKD  -- PT eval for left foot pain - xrays with no acute fx and no evidence of infection   -- Hd stable   -- possible dc planning pending labs/PT

## 2018-09-07 NOTE — PROGRESS NOTE ADULT - SUBJECTIVE AND OBJECTIVE BOX
[facility]   MD RAHEEL SHAH MD ANGELA WONG, PA    TEL:  OFFICE: [phone]  DR SANTOYO CELL: [phone]  DR. NGUYEN CELL: [phone]  UMM KENDALL CELL: [phone]        Patient is a 72y old  Female who presents with a chief complaint of Shortness of breath (07 Sep 2018 10:24)      Patient seen and examined at bedside. c/o feeling breathing heavy     VITALS:  T(F): 97.8 (09-07-18 @ 05:47), Max: 97.9 (09-06-18 @ 22:03)  HR: 75 (09-07-18 @ 05:47)  BP: 119/68 (09-07-18 @ 05:47)  RR: 18 (09-07-18 @ 05:47)  SpO2: 100% (09-07-18 @ 05:47)  Wt(kg): --    09-06 @ 07:01  -  09-07 @ 07:00  --------------------------------------------------------  IN: 360 mL / OUT: 0 mL / NET: 360 mL    09-07 @ 07:01  -  09-07 @ 12:12  --------------------------------------------------------  IN: 120 mL / OUT: 0 mL / NET: 120 mL          PHYSICAL EXAM:  Constitutional: NAD  Neck: No JVD  Respiratory: CTAB, no wheezes, rales or rhonchi  Cardiovascular: S1, S2, RRR  Gastrointestinal: BS+, soft, NT/ND  Extremities: No peripheral edema    Hospital Medications:   MEDICATIONS  (STANDING):  aspirin  chewable 81 milliGRAM(s) Oral daily  atorvastatin 40 milliGRAM(s) Oral at bedtime  furosemide    Tablet 40 milliGRAM(s) Oral daily  heparin  Injectable 5000 Unit(s) SubCutaneous every 8 hours  insulin glargine Injectable (LANTUS) 20 Unit(s) SubCutaneous every morning  insulin glargine Injectable (LANTUS) 60 Unit(s) SubCutaneous at bedtime  insulin lispro (HumaLOG) corrective regimen sliding scale   SubCutaneous three times a day with meals  insulin lispro (HumaLOG) corrective regimen sliding scale   SubCutaneous at bedtime  levothyroxine 50 MICROGram(s) Oral daily  metoprolol tartrate 12.5 milliGRAM(s) Oral two times a day  ticagrelor 90 milliGRAM(s) Oral two times a day      LABS:  09-06    138  |  101  |  45<H>  ----------------------------<  130<H>  4.2   |  18<L>  |  1.71<H>    Ca    10.5      06 Sep 2018 06:47  Mg     2.3     09-06      Creatinine Trend: 1.71 <--, 1.63 <--, 1.73 <--, 1.42 <--, 1.60 <--, 1.49 <--, 1.67 <--, 1.66 <--                                12.3   8.74  )-----------( 305      ( 07 Sep 2018 11:00 )             36.7     Urine Studies:  Urinalysis - [09-01-18 @ 04:29]      Color YELLOW / Appearance CLEAR / SG 1.015 / pH 6.5      Gluc 250 / Ketone NEGATIVE  / Bili NEGATIVE / Urobili NORMAL       Blood MODERATE / Protein MODERATE / Leuk Est NEGATIVE / Nitrite NEGATIVE      RBC 5-10 / WBC 2-5 / Hyaline  / Gran  / Sq Epi OCC. / Non Sq Epi  / Bacteria       Iron 40, TIBC 377, %sat --      [01-20-18 @ 06:06]  Ferritin 38.35      [01-20-18 @ 06:06]  HbA1c 7.4      [09-02-18 @ 02:30]  TSH 0.55      [01-19-18 @ 06:45]  Lipid: chol 130, , HDL 30, LDL 70      [01-19-18 @ 06:45]        RADIOLOGY & ADDITIONAL STUDIES: [facility] [place] [facility]   MD RAHEEL SHAH MD ANGELA WONG, PA    TEL:  OFFICE: [phone]  DR SANTOYO CELL: [phone]  DR. NGUYEN CELL: [phone]  UMM KENDALL CELL: [phone]        Patient is a 72y old  Female who presents with a chief complaint of Shortness of breath (07 Sep 2018 10:24)      Patient seen and examined at bedside. c/o " heavy breathing "    VITALS:  T(F): 97.8 (09-07-18 @ 05:47), Max: 97.9 (09-06-18 @ 22:03)  HR: 75 (09-07-18 @ 05:47)  BP: 119/68 (09-07-18 @ 05:47)  RR: 18 (09-07-18 @ 05:47)  SpO2: 100% (09-07-18 @ 05:47)  Wt(kg): --    09-06 @ 07:01  -  09-07 @ 07:00  --------------------------------------------------------  IN: 360 mL / OUT: 0 mL / NET: 360 mL    09-07 @ 07:01  -  09-07 @ 12:12  --------------------------------------------------------  IN: 120 mL / OUT: 0 mL / NET: 120 mL          PHYSICAL EXAM:  Constitutional: NAD  Neck: No JVD  Respiratory: CTAB, no wheezes, rales or rhonchi  Cardiovascular: S1, S2, RRR  Gastrointestinal: BS+, soft, NT/ND  Extremities: No peripheral edema    Hospital Medications:   MEDICATIONS  (STANDING):  aspirin  chewable 81 milliGRAM(s) Oral daily  atorvastatin 40 milliGRAM(s) Oral at bedtime  furosemide    Tablet 40 milliGRAM(s) Oral daily  heparin  Injectable 5000 Unit(s) SubCutaneous every 8 hours  insulin glargine Injectable (LANTUS) 20 Unit(s) SubCutaneous every morning  insulin glargine Injectable (LANTUS) 60 Unit(s) SubCutaneous at bedtime  insulin lispro (HumaLOG) corrective regimen sliding scale   SubCutaneous three times a day with meals  insulin lispro (HumaLOG) corrective regimen sliding scale   SubCutaneous at bedtime  levothyroxine 50 MICROGram(s) Oral daily  metoprolol tartrate 12.5 milliGRAM(s) Oral two times a day  ticagrelor 90 milliGRAM(s) Oral two times a day      LABS:  09-06    138  |  101  |  45<H>  ----------------------------<  130<H>  4.2   |  18<L>  |  1.71<H>    Ca    10.5      06 Sep 2018 06:47  Mg     2.3     09-06      Creatinine Trend: 1.71 <--, 1.63 <--, 1.73 <--, 1.42 <--, 1.60 <--, 1.49 <--, 1.67 <--, 1.66 <--                                12.3   8.74  )-----------( 305      ( 07 Sep 2018 11:00 )             36.7     Urine Studies:  Urinalysis - [09-01-18 @ 04:29]      Color YELLOW / Appearance CLEAR / SG 1.015 / pH 6.5      Gluc 250 / Ketone NEGATIVE  / Bili NEGATIVE / Urobili NORMAL       Blood MODERATE / Protein MODERATE / Leuk Est NEGATIVE / Nitrite NEGATIVE      RBC 5-10 / WBC 2-5 / Hyaline  / Gran  / Sq Epi OCC. / Non Sq Epi  / Bacteria       Iron 40, TIBC 377, %sat --      [01-20-18 @ 06:06]  Ferritin 38.35      [01-20-18 @ 06:06]  HbA1c 7.4      [09-02-18 @ 02:30]  TSH 0.55      [01-19-18 @ 06:45]  Lipid: chol 130, , HDL 30, LDL 70      [01-19-18 @ 06:45]        RADIOLOGY & ADDITIONAL STUDIES:

## 2018-09-07 NOTE — CHART NOTE - NSCHARTNOTEFT_GEN_A_CORE
d/w renal given BMP results  MERVIN on CKD - hold Lasix for now. May be diuresing secondary to significant hyperglycemia.  Endo eval pending.   PT eval pending given left foot pain with no evidence of infection or vascular compromise with unremarkable xrays.  Possible DC planning for 9/8 pending above and tomorrow's BMP.   Will need standing Lasix on DC.

## 2018-09-07 NOTE — PROGRESS NOTE ADULT - ASSESSMENT
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 Patient was hypoxic got admitted to MICU being treated for CHF and pneumonia

## 2018-09-07 NOTE — PROGRESS NOTE ADULT - PROBLEM SELECTOR PLAN 1
Pt with CKD sec to long standing DM/HTN and CHF.   Baseline SCr 1.5-1.8  Renal function fluctuates sec to CHF, however relatively stable   Repeat ECHO done EF 37%, no more intervention per cardio  Monitor renal function and electrolyte.   avoid nephrotoxics, NSAIDS RCA. Pt with CKD sec to long standing DM/HTN and CHF.   Baseline SCr 1.5-1.8  renal function worsen today likely sec to uncontrolled DM. She had received lasix 40mg PO this AM. d/w with cardiology will hold lasix for tmr AM, resume if Scr better. patient seems euvolemic on exam although still c/o heavy breathing. Endo is consulted for DM control   Repeat ECHO done EF 37%, no more intervention per cardio  Monitor renal function and electrolyte.   avoid nephrotoxics, NSAIDS RCA. Pt with CKD sec to long standing DM/HTN and CHF.   Baseline SCr 1.5-1.8  renal function worsen today likely sec to uncontrolled DM.   Pt  received lasix 40mg PO this AM. d/w with cardiology will hold lasix for tmr AM, resume if Scr better. patient seems euvolemic on exam although still c/o heavy breathing. Endo is consulted for DM control   Repeat ECHO done EF 37%, no more intervention per cardio  Monitor renal function and electrolyte.   avoid nephrotoxics, NSAIDS RCA.

## 2018-09-08 DIAGNOSIS — N17.9 ACUTE KIDNEY FAILURE, UNSPECIFIED: ICD-10-CM

## 2018-09-08 DIAGNOSIS — I25.10 ATHEROSCLEROTIC HEART DISEASE OF NATIVE CORONARY ARTERY WITHOUT ANGINA PECTORIS: ICD-10-CM

## 2018-09-08 DIAGNOSIS — E11.9 TYPE 2 DIABETES MELLITUS WITHOUT COMPLICATIONS: ICD-10-CM

## 2018-09-08 DIAGNOSIS — E03.9 HYPOTHYROIDISM, UNSPECIFIED: ICD-10-CM

## 2018-09-08 LAB
BASOPHILS # BLD AUTO: 0.07 K/UL — SIGNIFICANT CHANGE UP (ref 0–0.2)
BASOPHILS NFR BLD AUTO: 0.7 % — SIGNIFICANT CHANGE UP (ref 0–2)
BUN SERPL-MCNC: 59 MG/DL — HIGH (ref 7–23)
CALCIUM SERPL-MCNC: 10.1 MG/DL — SIGNIFICANT CHANGE UP (ref 8.4–10.5)
CHLORIDE SERPL-SCNC: 95 MMOL/L — LOW (ref 98–107)
CO2 SERPL-SCNC: 20 MMOL/L — LOW (ref 22–31)
CREAT SERPL-MCNC: 2.13 MG/DL — HIGH (ref 0.5–1.3)
EOSINOPHIL # BLD AUTO: 0.59 K/UL — HIGH (ref 0–0.5)
EOSINOPHIL NFR BLD AUTO: 5.8 % — SIGNIFICANT CHANGE UP (ref 0–6)
GLUCOSE SERPL-MCNC: 295 MG/DL — HIGH (ref 70–99)
HCT VFR BLD CALC: 34.9 % — SIGNIFICANT CHANGE UP (ref 34.5–45)
HGB BLD-MCNC: 11.4 G/DL — LOW (ref 11.5–15.5)
IMM GRANULOCYTES # BLD AUTO: 0.19 # — SIGNIFICANT CHANGE UP
IMM GRANULOCYTES NFR BLD AUTO: 1.9 % — HIGH (ref 0–1.5)
LYMPHOCYTES # BLD AUTO: 2.53 K/UL — SIGNIFICANT CHANGE UP (ref 1–3.3)
LYMPHOCYTES # BLD AUTO: 25 % — SIGNIFICANT CHANGE UP (ref 13–44)
MAGNESIUM SERPL-MCNC: 2.2 MG/DL — SIGNIFICANT CHANGE UP (ref 1.6–2.6)
MCHC RBC-ENTMCNC: 29.1 PG — SIGNIFICANT CHANGE UP (ref 27–34)
MCHC RBC-ENTMCNC: 32.7 % — SIGNIFICANT CHANGE UP (ref 32–36)
MCV RBC AUTO: 89 FL — SIGNIFICANT CHANGE UP (ref 80–100)
MONOCYTES # BLD AUTO: 0.94 K/UL — HIGH (ref 0–0.9)
MONOCYTES NFR BLD AUTO: 9.3 % — SIGNIFICANT CHANGE UP (ref 2–14)
NEUTROPHILS # BLD AUTO: 5.79 K/UL — SIGNIFICANT CHANGE UP (ref 1.8–7.4)
NEUTROPHILS NFR BLD AUTO: 57.3 % — SIGNIFICANT CHANGE UP (ref 43–77)
NRBC # FLD: 0 — SIGNIFICANT CHANGE UP
PLATELET # BLD AUTO: 290 K/UL — SIGNIFICANT CHANGE UP (ref 150–400)
PMV BLD: 9 FL — SIGNIFICANT CHANGE UP (ref 7–13)
POTASSIUM SERPL-MCNC: 4.5 MMOL/L — SIGNIFICANT CHANGE UP (ref 3.5–5.3)
POTASSIUM SERPL-SCNC: 4.5 MMOL/L — SIGNIFICANT CHANGE UP (ref 3.5–5.3)
PTH-INTACT SERPL-MCNC: 36.6 PG/ML — SIGNIFICANT CHANGE UP (ref 15–65)
RBC # BLD: 3.92 M/UL — SIGNIFICANT CHANGE UP (ref 3.8–5.2)
RBC # FLD: 13.5 % — SIGNIFICANT CHANGE UP (ref 10.3–14.5)
SODIUM SERPL-SCNC: 133 MMOL/L — LOW (ref 135–145)
WBC # BLD: 10.11 K/UL — SIGNIFICANT CHANGE UP (ref 3.8–10.5)
WBC # FLD AUTO: 10.11 K/UL — SIGNIFICANT CHANGE UP (ref 3.8–10.5)

## 2018-09-08 RX ORDER — INSULIN GLARGINE 100 [IU]/ML
66 INJECTION, SOLUTION SUBCUTANEOUS AT BEDTIME
Qty: 0 | Refills: 0 | Status: DISCONTINUED | OUTPATIENT
Start: 2018-09-08 | End: 2018-09-10

## 2018-09-08 RX ORDER — INSULIN GLARGINE 100 [IU]/ML
24 INJECTION, SOLUTION SUBCUTANEOUS EVERY MORNING
Qty: 0 | Refills: 0 | Status: DISCONTINUED | OUTPATIENT
Start: 2018-09-08 | End: 2018-09-09

## 2018-09-08 RX ORDER — TRAMADOL HYDROCHLORIDE 50 MG/1
25 TABLET ORAL ONCE
Qty: 0 | Refills: 0 | Status: DISCONTINUED | OUTPATIENT
Start: 2018-09-08 | End: 2018-09-08

## 2018-09-08 RX ADMIN — TRAMADOL HYDROCHLORIDE 25 MILLIGRAM(S): 50 TABLET ORAL at 23:40

## 2018-09-08 RX ADMIN — HEPARIN SODIUM 5000 UNIT(S): 5000 INJECTION INTRAVENOUS; SUBCUTANEOUS at 05:18

## 2018-09-08 RX ADMIN — TICAGRELOR 90 MILLIGRAM(S): 90 TABLET ORAL at 17:31

## 2018-09-08 RX ADMIN — Medication 6: at 21:48

## 2018-09-08 RX ADMIN — Medication 10: at 18:16

## 2018-09-08 RX ADMIN — Medication 12.5 MILLIGRAM(S): at 17:31

## 2018-09-08 RX ADMIN — TRAMADOL HYDROCHLORIDE 25 MILLIGRAM(S): 50 TABLET ORAL at 22:56

## 2018-09-08 RX ADMIN — Medication 8: at 12:58

## 2018-09-08 RX ADMIN — HEPARIN SODIUM 5000 UNIT(S): 5000 INJECTION INTRAVENOUS; SUBCUTANEOUS at 21:48

## 2018-09-08 RX ADMIN — Medication 81 MILLIGRAM(S): at 12:58

## 2018-09-08 RX ADMIN — Medication 50 MICROGRAM(S): at 05:18

## 2018-09-08 RX ADMIN — Medication 4: at 09:11

## 2018-09-08 RX ADMIN — TICAGRELOR 90 MILLIGRAM(S): 90 TABLET ORAL at 05:18

## 2018-09-08 RX ADMIN — Medication 12.5 MILLIGRAM(S): at 05:18

## 2018-09-08 RX ADMIN — ATORVASTATIN CALCIUM 40 MILLIGRAM(S): 80 TABLET, FILM COATED ORAL at 21:48

## 2018-09-08 RX ADMIN — INSULIN GLARGINE 66 UNIT(S): 100 INJECTION, SOLUTION SUBCUTANEOUS at 21:48

## 2018-09-08 RX ADMIN — INSULIN GLARGINE 20 UNIT(S): 100 INJECTION, SOLUTION SUBCUTANEOUS at 09:10

## 2018-09-08 NOTE — PROGRESS NOTE ADULT - ASSESSMENT
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 Patient was hypoxic got admitted to MICU being treated for CHF and pneumonia

## 2018-09-08 NOTE — PROGRESS NOTE ADULT - PROBLEM SELECTOR PLAN 1
now MERVIN likely sec to uncontrolled hyperglycemia on lasix. last dose of lasix 9/7, endo consulted for DM Control  poor EF 37%.   Continue to hold lasix  monitor bmp  avoid nephrotoxic agents

## 2018-09-08 NOTE — CONSULT NOTE ADULT - ASSESSMENT
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 Patient was hypoxic got admitted to MICU being treated for CHF and pneumonia
Assessment  DMT2: 72y Female with DM T2 with hyperglycemia on large dose basal bolus insulin, blood sugars running high,  no hypoglycemic episode,  eating meals,  non compliant with low carb diet.  Pulmonary Edema/CAD: On medications, stable, monitored.  Hypothyroidism:  On synthroid 50  mcg po daily, asymptomatic.  HTN: Controlled, On med.

## 2018-09-08 NOTE — PROGRESS NOTE ADULT - SUBJECTIVE AND OBJECTIVE BOX
[facility] [place] [facility]   MD RAHEEL SHAH MD ANGELA WONG, PA    TEL:  OFFICE: [phone]  DR SANTOYO CELL: [phone]  DR. NGUYEN CELL: [phone]  UMM KENDALL CELL: [phone]        Patient is a 72y old  Female who presents with a chief complaint of Shortness of breath (07 Sep 2018 12:11)      Patient seen and examined at bedside. No chest pain/sob    VITALS:  T(F): 97.9 (09-08-18 @ 05:19), Max: 97.9 (09-07-18 @ 13:57)  HR: 72 (09-08-18 @ 05:19)  BP: 127/69 (09-08-18 @ 05:19)  RR: 16 (09-08-18 @ 05:19)  SpO2: 99% (09-08-18 @ 05:19)  Wt(kg): --    09-07 @ 07:01  -  09-08 @ 07:00  --------------------------------------------------------  IN: 760 mL / OUT: 300 mL / NET: 460 mL    09-08 @ 07:01  -  09-08 @ 11:52  --------------------------------------------------------  IN: 360 mL / OUT: 0 mL / NET: 360 mL          PHYSICAL EXAM:  Constitutional: NAD  Neck: No JVD  Respiratory: CTAB, no wheezes, rales or rhonchi  Cardiovascular: S1, S2, RRR  Gastrointestinal: BS+, soft, NT/ND  Extremities: No peripheral edema    Hospital Medications:   MEDICATIONS  (STANDING):  aspirin  chewable 81 milliGRAM(s) Oral daily  atorvastatin 40 milliGRAM(s) Oral at bedtime  heparin  Injectable 5000 Unit(s) SubCutaneous every 8 hours  insulin glargine Injectable (LANTUS) 20 Unit(s) SubCutaneous every morning  insulin glargine Injectable (LANTUS) 60 Unit(s) SubCutaneous at bedtime  insulin lispro (HumaLOG) corrective regimen sliding scale   SubCutaneous three times a day with meals  insulin lispro (HumaLOG) corrective regimen sliding scale   SubCutaneous at bedtime  levothyroxine 50 MICROGram(s) Oral daily  metoprolol tartrate 12.5 milliGRAM(s) Oral two times a day  ticagrelor 90 milliGRAM(s) Oral two times a day      LABS:  09-08    133<L>  |  95<L>  |  59<H>  ----------------------------<  295<H>  4.5   |  20<L>  |  2.13<H>    Ca    10.1      08 Sep 2018 03:15  Mg     2.2     09-08      Creatinine Trend: 2.13 <--, 1.94 <--, 1.71 <--, 1.63 <--, 1.73 <--, 1.42 <--, 1.60 <--, 1.49 <--      calcium--  intact pth--  parathyroid hormone intact, serum36.60                            11.4   10.11 )-----------( 290      ( 08 Sep 2018 03:15 )             34.9     Urine Studies:  Urinalysis - [09-01-18 @ 04:29]      Color YELLOW / Appearance CLEAR / SG 1.015 / pH 6.5      Gluc 250 / Ketone NEGATIVE  / Bili NEGATIVE / Urobili NORMAL       Blood MODERATE / Protein MODERATE / Leuk Est NEGATIVE / Nitrite NEGATIVE      RBC 5-10 / WBC 2-5 / Hyaline  / Gran  / Sq Epi OCC. / Non Sq Epi  / Bacteria       Iron 40, TIBC 377, %sat --      [01-20-18 @ 06:06]  Ferritin 38.35      [01-20-18 @ 06:06]  PTH 36.60 (Ca --)      [09-08-18 @ 03:15]   --  HbA1c 7.4      [09-02-18 @ 02:30]  TSH 0.55      [01-19-18 @ 06:45]  Lipid: chol 130, , HDL 30, LDL 70      [01-19-18 @ 06:45]        RADIOLOGY & ADDITIONAL STUDIES:

## 2018-09-08 NOTE — PROGRESS NOTE ADULT - ATTENDING COMMENTS
Patient seen and examined.  Agree with above.   -Check TTE to evaluate LV function   -continue with echo Ga MD
CARDIOLOGY ATTENDING    Patient seen and examined. Agree with above. LVEF 37% by echo so no indication for ICD therapy. No further inpatient cardiac workup needed. d/c planning
Patient seen and examined.  Agree with above.   -pt. appears more euvolemic today  -EF 37% on TTE  -continue with medical management of cad      Corie Ga MD
Patient seen and examined.  Agree with above.   -pt. still volume overloaded  -continue with IV lasix  -check TTE  -further workup pending above    Corie Ga MD
Patient seen and examined, agree with above assessment and plan as transcribed above.    - Lasix on hold  - Renal f/u    Vargas Adame MD, FACC
Right lower lobe pneumonia with heart failure improving clinically.  Stable for transfer to the floor.

## 2018-09-08 NOTE — CONSULT NOTE ADULT - PROBLEM SELECTOR RECOMMENDATION 9
Baseline SCr 1.5-1.8  Renal function fluctuates sec to CHF  Monitor renal function and electrolyte
Will increase Lantus to 66 units at bed time.  Will increase Humalog to 24 units before each meal in addition to Humalog correction scale coverage.  Patient counseled for compliance with consistent low carb diet.

## 2018-09-08 NOTE — PROGRESS NOTE ADULT - SUBJECTIVE AND OBJECTIVE BOX
no palpitations, no syncope, c/o mild dyspnea      MEDICATIONS  (STANDING):  aspirin  chewable 81 milliGRAM(s) Oral daily  atorvastatin 40 milliGRAM(s) Oral at bedtime  heparin  Injectable 5000 Unit(s) SubCutaneous every 8 hours  insulin glargine Injectable (LANTUS) 20 Unit(s) SubCutaneous every morning  insulin glargine Injectable (LANTUS) 60 Unit(s) SubCutaneous at bedtime  insulin lispro (HumaLOG) corrective regimen sliding scale   SubCutaneous three times a day with meals  insulin lispro (HumaLOG) corrective regimen sliding scale   SubCutaneous at bedtime  levothyroxine 50 MICROGram(s) Oral daily  metoprolol tartrate 12.5 milliGRAM(s) Oral two times a day  ticagrelor 90 milliGRAM(s) Oral two times a day    MEDICATIONS  (PRN):  acetaminophen   Tablet .. 650 milliGRAM(s) Oral every 6 hours PRN Mild Pain (1 - 3), Moderate Pain (4 - 6)  aluminum hydroxide/magnesium hydroxide/simethicone Suspension 30 milliLiter(s) Oral every 4 hours PRN Dyspepsia      LABS:                        11.4   10.11 )-----------( 290      ( 08 Sep 2018 03:15 )             34.9     133<L>  |  95<L>  |  59<H>  ----------------------------<  295<H>  4.5   |  20<L>  |  2.13<H>    Ca    10.1      08 Sep 2018 03:15  Mg     2.2     09-08    Creatinine Trend: 2.13<--, 1.94<--, 1.71<--, 1.63<--, 1.73<--, 1.42<--     CARDIAC MARKERS ( 07 Sep 2018 11:00 )  x     / x     / 95 u/L / x     / x      CARDIAC MARKERS ( 06 Sep 2018 06:47 )  x     / x     / 115 u/L / 3.83 ng/mL / x        PHYSICAL EXAM  Vital Signs Last 24 Hrs  T(C): 36.4 (08 Sep 2018 12:05), Max: 36.6 (07 Sep 2018 13:57)  T(F): 97.6 (08 Sep 2018 12:05), Max: 97.9 (07 Sep 2018 13:57)  HR: 72 (08 Sep 2018 12:05) (72 - 80)  BP: 106/62 (08 Sep 2018 12:05) (106/62 - 127/69)  RR: 16 (08 Sep 2018 12:05) (16 - 16)  SpO2: 98% (08 Sep 2018 12:05) (98% - 100%)      no JVD  RRR, no murmurs  good inspiratory effort with no crackles or rales  soft nt/nd  no c/c/e    DATA:    < from: TTE with Doppler (w/Cont) (01.23.18 @ 12:31) >  CONCLUSIONS:  1. Mitral annular calcification, otherwise normal mitral  valve. Mild-moderate mitral regurgitation.  2. Calcified trileaflet aortic valve with normal opening.  Mild aortic regurgitation.  3. Normal left ventricular internal dimensions and wall  thicknesses.  4. Endocardium not well visualized; grossly severe global  left ventricular systolic dysfunction.  Endocardial  visualization enhanced with intravenous injection of echo  contrast (Definity).  5. The right ventricle is not well visualized; grossly  normal right ventricular systolic function.  *** Compared with echocardiogram of 11/25/2017, no  significant changes noted.  ------------------------------------------------------------------------  Confirmed on  1/23/2018 - 14:35:49 by Jose Lucia M.D.    Culture - Blood (09.01.18 @ 04:45)    Culture - Blood:   NO ORGANISMS ISOLATED    Specimen Source: BLOOD VENOUS      < from: Xray Foot AP + Lateral, Left (09.05.18 @ 17:56) >  IMPRESSION:  No tracking soft tissue gas collections, radiopaque foreign bodies, or   gross radiographic evidence for osteomyelitis.    No fractures or dislocations.    Tarsometatarsal alignment maintained without evidence for a Lisfranc  injury.    Preserved visualized joint spaces. No joint margin erosions or   intra-articular or periarticular calcifications.    No lytic or blastic lesions.    Scant faint vascular calcifications.    < end of copied text >    < from: TTE with Doppler (w/Cont) (09.06.18 @ 15:21) >  CONCLUSIONS: EF 37%  1. Mitral annular calcification, otherwise normal mitral  valve. Mild mitral regurgitation.  2. Normal left ventricular internal dimensions and wall  thicknesses.  3. Endocardium not well visualized; grossly moderate to  severe global left ventricular systolic dysfunction.  The  inferolateral wall appears particularly hypokinetic.  Endocardial visualization enhanced with intravenous  injection of echo contrast (Definity).  4. The right ventricle is not well visualized; grossly  normal right ventricular systolic function.    < end of copied text >      ASSESSMENT/PLAN: 71 y/o Female PMH HTN, mild CKD, Hyperlipidemia, DM-II, CAD s/p 3V CABG  (LIMA to LAD, SVG to OM, SVG to PDA) at [facility] 2014, s/p CORNELIA TO RI 11/17/17, NSTEMI 12/2017 s/p LHC on 12/5 and CORNELIA to dRamus, TTE at that time revealed grossly moderate to severe LV dysfunction with hypokinesis of the inferior, lateral and inferolateral walls with an EF of 36% s/p LHC on 2/6  with no new OBS CAD, managed medically, admitted to MICU with SOB, Cough, Pulm edema, acute on chronic systolic HF and ?PNA. C/O chest pressure and positive troponin. She has a KNOWN  of the RCA fed by LAD collaterals. This is likely the culprit of her mild EKG changes and mild troponin elevation.     -- she is euvolemic - but given MERVIN Lasix was held, resume lasix when ok with Renal   -- was on abx for possible pna - completed 5 day course of Azithro/Rocephin  -- Repeat TTE noted - EF 37% - no need for ICD      - c/w BB. no ACE-I 2/2 CKD  -- PT eval for left foot pain - xrays with no acute fx and no evidence of infection --Reccs Home PT at DC  --Check AM labs no palpitations, no syncope, c/o mild dyspnea      MEDICATIONS  (STANDING):  aspirin  chewable 81 milliGRAM(s) Oral daily  atorvastatin 40 milliGRAM(s) Oral at bedtime  heparin  Injectable 5000 Unit(s) SubCutaneous every 8 hours  insulin glargine Injectable (LANTUS) 20 Unit(s) SubCutaneous every morning  insulin glargine Injectable (LANTUS) 60 Unit(s) SubCutaneous at bedtime  insulin lispro (HumaLOG) corrective regimen sliding scale   SubCutaneous three times a day with meals  insulin lispro (HumaLOG) corrective regimen sliding scale   SubCutaneous at bedtime  levothyroxine 50 MICROGram(s) Oral daily  metoprolol tartrate 12.5 milliGRAM(s) Oral two times a day  ticagrelor 90 milliGRAM(s) Oral two times a day    MEDICATIONS  (PRN):  acetaminophen   Tablet .. 650 milliGRAM(s) Oral every 6 hours PRN Mild Pain (1 - 3), Moderate Pain (4 - 6)  aluminum hydroxide/magnesium hydroxide/simethicone Suspension 30 milliLiter(s) Oral every 4 hours PRN Dyspepsia      LABS:                        11.4   10.11 )-----------( 290      ( 08 Sep 2018 03:15 )             34.9     133<L>  |  95<L>  |  59<H>  ----------------------------<  295<H>  4.5   |  20<L>  |  2.13<H>    Ca    10.1      08 Sep 2018 03:15  Mg     2.2     09-08    Creatinine Trend: 2.13<--, 1.94<--, 1.71<--, 1.63<--, 1.73<--, 1.42<--     CARDIAC MARKERS ( 07 Sep 2018 11:00 )  x     / x     / 95 u/L / x     / x      CARDIAC MARKERS ( 06 Sep 2018 06:47 )  x     / x     / 115 u/L / 3.83 ng/mL / x        PHYSICAL EXAM  Vital Signs Last 24 Hrs  T(C): 36.4 (08 Sep 2018 12:05), Max: 36.6 (07 Sep 2018 13:57)  T(F): 97.6 (08 Sep 2018 12:05), Max: 97.9 (07 Sep 2018 13:57)  HR: 72 (08 Sep 2018 12:05) (72 - 80)  BP: 106/62 (08 Sep 2018 12:05) (106/62 - 127/69)  RR: 16 (08 Sep 2018 12:05) (16 - 16)  SpO2: 98% (08 Sep 2018 12:05) (98% - 100%)      no JVD  RRR, no murmurs  good inspiratory effort with no crackles or rales  soft nt/nd  no c/c/e    DATA:    < from: TTE with Doppler (w/Cont) (01.23.18 @ 12:31) >  CONCLUSIONS:  1. Mitral annular calcification, otherwise normal mitral  valve. Mild-moderate mitral regurgitation.  2. Calcified trileaflet aortic valve with normal opening.  Mild aortic regurgitation.  3. Normal left ventricular internal dimensions and wall  thicknesses.  4. Endocardium not well visualized; grossly severe global  left ventricular systolic dysfunction.  Endocardial  visualization enhanced with intravenous injection of echo  contrast (Definity).  5. The right ventricle is not well visualized; grossly  normal right ventricular systolic function.  *** Compared with echocardiogram of 11/25/2017, no  significant changes noted.  ------------------------------------------------------------------------  Confirmed on  1/23/2018 - 14:35:49 by Jose Lucia M.D.    Culture - Blood (09.01.18 @ 04:45)    Culture - Blood:   NO ORGANISMS ISOLATED    Specimen Source: BLOOD VENOUS      < from: Xray Foot AP + Lateral, Left (09.05.18 @ 17:56) >  IMPRESSION:  No tracking soft tissue gas collections, radiopaque foreign bodies, or   gross radiographic evidence for osteomyelitis.    No fractures or dislocations.    Tarsometatarsal alignment maintained without evidence for a Lisfranc  injury.    Preserved visualized joint spaces. No joint margin erosions or   intra-articular or periarticular calcifications.    No lytic or blastic lesions.    Scant faint vascular calcifications.    < end of copied text >    < from: TTE with Doppler (w/Cont) (09.06.18 @ 15:21) >  CONCLUSIONS: EF 37%  1. Mitral annular calcification, otherwise normal mitral  valve. Mild mitral regurgitation.  2. Normal left ventricular internal dimensions and wall  thicknesses.  3. Endocardium not well visualized; grossly moderate to  severe global left ventricular systolic dysfunction.  The  inferolateral wall appears particularly hypokinetic.  Endocardial visualization enhanced with intravenous  injection of echo contrast (Definity).  4. The right ventricle is not well visualized; grossly  normal right ventricular systolic function.    < end of copied text >      ASSESSMENT/PLAN: 71 y/o Female PMH HTN, mild CKD, Hyperlipidemia, DM-II, CAD s/p 3V CABG  (LIMA to LAD, SVG to OM, SVG to PDA) at [facility] 2014, s/p CORNELIA TO RI 11/17/17, NSTEMI 12/2017 s/p LHC on 12/5 and CORNELIA to dRamus, TTE at that time revealed grossly moderate to severe LV dysfunction with hypokinesis of the inferior, lateral and inferolateral walls with an EF of 36% s/p LHC on 2/6  with no new OBS CAD, managed medically, admitted to MICU with SOB, Cough, Pulm edema, acute on chronic systolic HF and ?PNA. C/O chest pressure and positive troponin. She has a KNOWN  of the RCA fed by LAD collaterals. This is likely the culprit of her mild EKG changes and mild troponin elevation.     -- she is euvolemic - but given MERVIN Lasix was held, resume lasix when ok with Renal   -- was on abx for possible pna - completed 5 day course of Azithro/Rocephin  -- Repeat TTE noted - EF 37% - no need for ICD      - c/w BB. no ACE-I 2/2 CKD  -- PT eval for left foot pain - xrays with no acute fx and no evidence of infection --Reccs Home PT at DC  --uncontrolled DM-- ENDO eval with Dr Banks  --Check AM labs

## 2018-09-08 NOTE — CONSULT NOTE ADULT - PROBLEM SELECTOR RECOMMENDATION 2
EF 32%  Being treated for CHF and Pneumonia  clinically improving
On medications, monitored and followed up by primary/cardiology team

## 2018-09-09 LAB
24R-OH-CALCIDIOL SERPL-MCNC: 15.8 NG/ML — LOW (ref 30–80)
BASOPHILS # BLD AUTO: 0.06 K/UL — SIGNIFICANT CHANGE UP (ref 0–0.2)
BASOPHILS NFR BLD AUTO: 0.6 % — SIGNIFICANT CHANGE UP (ref 0–2)
BUN SERPL-MCNC: 54 MG/DL — HIGH (ref 7–23)
CALCIUM SERPL-MCNC: 10.4 MG/DL — SIGNIFICANT CHANGE UP (ref 8.4–10.5)
CHLORIDE SERPL-SCNC: 99 MMOL/L — SIGNIFICANT CHANGE UP (ref 98–107)
CO2 SERPL-SCNC: 20 MMOL/L — LOW (ref 22–31)
CREAT SERPL-MCNC: 1.76 MG/DL — HIGH (ref 0.5–1.3)
EOSINOPHIL # BLD AUTO: 0.55 K/UL — HIGH (ref 0–0.5)
EOSINOPHIL NFR BLD AUTO: 5.9 % — SIGNIFICANT CHANGE UP (ref 0–6)
GLUCOSE SERPL-MCNC: 258 MG/DL — HIGH (ref 70–99)
HCT VFR BLD CALC: 34.6 % — SIGNIFICANT CHANGE UP (ref 34.5–45)
HGB BLD-MCNC: 11.5 G/DL — SIGNIFICANT CHANGE UP (ref 11.5–15.5)
IMM GRANULOCYTES # BLD AUTO: 0.19 # — SIGNIFICANT CHANGE UP
IMM GRANULOCYTES NFR BLD AUTO: 2 % — HIGH (ref 0–1.5)
LYMPHOCYTES # BLD AUTO: 2.4 K/UL — SIGNIFICANT CHANGE UP (ref 1–3.3)
LYMPHOCYTES # BLD AUTO: 25.8 % — SIGNIFICANT CHANGE UP (ref 13–44)
MAGNESIUM SERPL-MCNC: 2.4 MG/DL — SIGNIFICANT CHANGE UP (ref 1.6–2.6)
MCHC RBC-ENTMCNC: 29.1 PG — SIGNIFICANT CHANGE UP (ref 27–34)
MCHC RBC-ENTMCNC: 33.2 % — SIGNIFICANT CHANGE UP (ref 32–36)
MCV RBC AUTO: 87.6 FL — SIGNIFICANT CHANGE UP (ref 80–100)
MONOCYTES # BLD AUTO: 0.94 K/UL — HIGH (ref 0–0.9)
MONOCYTES NFR BLD AUTO: 10.1 % — SIGNIFICANT CHANGE UP (ref 2–14)
NEUTROPHILS # BLD AUTO: 5.16 K/UL — SIGNIFICANT CHANGE UP (ref 1.8–7.4)
NEUTROPHILS NFR BLD AUTO: 55.6 % — SIGNIFICANT CHANGE UP (ref 43–77)
NRBC # FLD: 0 — SIGNIFICANT CHANGE UP
PLATELET # BLD AUTO: 288 K/UL — SIGNIFICANT CHANGE UP (ref 150–400)
PMV BLD: 8.8 FL — SIGNIFICANT CHANGE UP (ref 7–13)
POTASSIUM SERPL-MCNC: 4.4 MMOL/L — SIGNIFICANT CHANGE UP (ref 3.5–5.3)
POTASSIUM SERPL-SCNC: 4.4 MMOL/L — SIGNIFICANT CHANGE UP (ref 3.5–5.3)
RBC # BLD: 3.95 M/UL — SIGNIFICANT CHANGE UP (ref 3.8–5.2)
RBC # FLD: 13.4 % — SIGNIFICANT CHANGE UP (ref 10.3–14.5)
SODIUM SERPL-SCNC: 135 MMOL/L — SIGNIFICANT CHANGE UP (ref 135–145)
VIT D25+D1,25 OH+D1,25 PNL SERPL-MCNC: 18.4 PG/ML — LOW (ref 19.9–79.3)
WBC # BLD: 9.3 K/UL — SIGNIFICANT CHANGE UP (ref 3.8–10.5)
WBC # FLD AUTO: 9.3 K/UL — SIGNIFICANT CHANGE UP (ref 3.8–10.5)

## 2018-09-09 RX ORDER — GLUCAGON INJECTION, SOLUTION 0.5 MG/.1ML
1 INJECTION, SOLUTION SUBCUTANEOUS ONCE
Qty: 0 | Refills: 0 | Status: DISCONTINUED | OUTPATIENT
Start: 2018-09-09 | End: 2018-09-10

## 2018-09-09 RX ORDER — DEXTROSE 50 % IN WATER 50 %
25 SYRINGE (ML) INTRAVENOUS ONCE
Qty: 0 | Refills: 0 | Status: DISCONTINUED | OUTPATIENT
Start: 2018-09-09 | End: 2018-09-10

## 2018-09-09 RX ORDER — DEXTROSE 50 % IN WATER 50 %
12.5 SYRINGE (ML) INTRAVENOUS ONCE
Qty: 0 | Refills: 0 | Status: DISCONTINUED | OUTPATIENT
Start: 2018-09-09 | End: 2018-09-10

## 2018-09-09 RX ORDER — DEXTROSE 50 % IN WATER 50 %
15 SYRINGE (ML) INTRAVENOUS ONCE
Qty: 0 | Refills: 0 | Status: DISCONTINUED | OUTPATIENT
Start: 2018-09-09 | End: 2018-09-10

## 2018-09-09 RX ORDER — INSULIN LISPRO 100/ML
24 VIAL (ML) SUBCUTANEOUS
Qty: 0 | Refills: 0 | Status: DISCONTINUED | OUTPATIENT
Start: 2018-09-09 | End: 2018-09-10

## 2018-09-09 RX ORDER — SODIUM CHLORIDE 9 MG/ML
1000 INJECTION, SOLUTION INTRAVENOUS
Qty: 0 | Refills: 0 | Status: DISCONTINUED | OUTPATIENT
Start: 2018-09-09 | End: 2018-09-10

## 2018-09-09 RX ADMIN — Medication 10: at 13:29

## 2018-09-09 RX ADMIN — Medication 12.5 MILLIGRAM(S): at 18:02

## 2018-09-09 RX ADMIN — Medication 24 UNIT(S): at 18:02

## 2018-09-09 RX ADMIN — HEPARIN SODIUM 5000 UNIT(S): 5000 INJECTION INTRAVENOUS; SUBCUTANEOUS at 05:42

## 2018-09-09 RX ADMIN — Medication 0: at 21:44

## 2018-09-09 RX ADMIN — Medication 50 MICROGRAM(S): at 05:43

## 2018-09-09 RX ADMIN — HEPARIN SODIUM 5000 UNIT(S): 5000 INJECTION INTRAVENOUS; SUBCUTANEOUS at 13:29

## 2018-09-09 RX ADMIN — Medication 8: at 18:02

## 2018-09-09 RX ADMIN — INSULIN GLARGINE 24 UNIT(S): 100 INJECTION, SOLUTION SUBCUTANEOUS at 09:03

## 2018-09-09 RX ADMIN — TICAGRELOR 90 MILLIGRAM(S): 90 TABLET ORAL at 05:43

## 2018-09-09 RX ADMIN — Medication 12.5 MILLIGRAM(S): at 05:42

## 2018-09-09 RX ADMIN — Medication 4: at 09:03

## 2018-09-09 RX ADMIN — TICAGRELOR 90 MILLIGRAM(S): 90 TABLET ORAL at 18:02

## 2018-09-09 RX ADMIN — ATORVASTATIN CALCIUM 40 MILLIGRAM(S): 80 TABLET, FILM COATED ORAL at 21:44

## 2018-09-09 RX ADMIN — HEPARIN SODIUM 5000 UNIT(S): 5000 INJECTION INTRAVENOUS; SUBCUTANEOUS at 21:44

## 2018-09-09 RX ADMIN — INSULIN GLARGINE 66 UNIT(S): 100 INJECTION, SOLUTION SUBCUTANEOUS at 21:44

## 2018-09-09 RX ADMIN — Medication 81 MILLIGRAM(S): at 13:29

## 2018-09-09 RX ADMIN — Medication 24 UNIT(S): at 13:29

## 2018-09-09 NOTE — PROGRESS NOTE ADULT - SUBJECTIVE AND OBJECTIVE BOX
Dr. Muhammad  Office [phone]  Cell [phone]  [facility]  Cell [phone]      Patient is a 72y old  Female who presents with a chief complaint of Shortness of breath (09 Sep 2018 13:39)      Patient seen and examined at bedside. No chest pain/sob    VITALS:  T(F): 98.1 (09-09-18 @ 14:01), Max: 98.1 (09-09-18 @ 14:01)  HR: 73 (09-09-18 @ 14:01)  BP: 113/59 (09-09-18 @ 14:01)  RR: 18 (09-09-18 @ 14:01)  SpO2: 100% (09-09-18 @ 14:01)  Wt(kg): --    09-08 @ 07:01  -  09-09 @ 07:00  --------------------------------------------------------  IN: 600 mL / OUT: 0 mL / NET: 600 mL    09-09 @ 07:01  -  09-09 @ 17:37  --------------------------------------------------------  IN: 490 mL / OUT: 400 mL / NET: 90 mL          PHYSICAL EXAM:  Constitutional: NAD  Neck: No JVD  Respiratory: CTAB, no wheezes, rales or rhonchi  Cardiovascular: S1, S2, RRR  Gastrointestinal: BS+, soft, NT/ND  Extremities: No peripheral edema    Hospital Medications:   MEDICATIONS  (STANDING):  aspirin  chewable 81 milliGRAM(s) Oral daily  atorvastatin 40 milliGRAM(s) Oral at bedtime  dextrose 5%. 1000 milliLiter(s) (50 mL/Hr) IV Continuous <Continuous>  dextrose 50% Injectable 12.5 Gram(s) IV Push once  dextrose 50% Injectable 25 Gram(s) IV Push once  dextrose 50% Injectable 25 Gram(s) IV Push once  heparin  Injectable 5000 Unit(s) SubCutaneous every 8 hours  insulin glargine Injectable (LANTUS) 66 Unit(s) SubCutaneous at bedtime  insulin lispro (HumaLOG) corrective regimen sliding scale   SubCutaneous at bedtime  insulin lispro (HumaLOG) corrective regimen sliding scale   SubCutaneous three times a day with meals  insulin lispro Injectable (HumaLOG) 24 Unit(s) SubCutaneous three times a day before meals  levothyroxine 50 MICROGram(s) Oral daily  metoprolol tartrate 12.5 milliGRAM(s) Oral two times a day  ticagrelor 90 milliGRAM(s) Oral two times a day      LABS:  09-09    135  |  99  |  54<H>  ----------------------------<  258<H>  4.4   |  20<L>  |  1.76<H>    Ca    10.4      09 Sep 2018 04:20  Mg     2.4     09-09      Creatinine Trend: 1.76 <--, 2.13 <--, 1.94 <--, 1.71 <--, 1.63 <--, 1.73 <--, 1.42 <--                                11.5   9.30  )-----------( 288      ( 09 Sep 2018 04:20 )             34.6     Urine Studies:  Urinalysis - [09-01-18 @ 04:29]      Color YELLOW / Appearance CLEAR / SG 1.015 / pH 6.5      Gluc 250 / Ketone NEGATIVE  / Bili NEGATIVE / Urobili NORMAL       Blood MODERATE / Protein MODERATE / Leuk Est NEGATIVE / Nitrite NEGATIVE      RBC 5-10 / WBC 2-5 / Hyaline  / Gran  / Sq Epi OCC. / Non Sq Epi  / Bacteria       Iron 40, TIBC 377, %sat --      [01-20-18 @ 06:06]  Ferritin 38.35      [01-20-18 @ 06:06]  PTH 36.60 (Ca --)      [09-08-18 @ 03:15]   --  Vitamin D (25OH) 15.8      [09-08-18 @ 03:15]  HbA1c 7.4      [09-02-18 @ 02:30]  TSH 0.55      [01-19-18 @ 06:45]  Lipid: chol 130, , HDL 30, LDL 70      [01-19-18 @ 06:45]        RADIOLOGY & ADDITIONAL STUDIES:

## 2018-09-09 NOTE — PROGRESS NOTE ADULT - SUBJECTIVE AND OBJECTIVE BOX
Chief complaint  Patient is a 72y old  Female who presents with a chief complaint of Shortness of breath (09 Sep 2018 17:37)   Review of systems  Patient in bed, looks comfortable, no fever, no hypoglycemia.    Labs and Fingersticks  CAPILLARY BLOOD GLUCOSE      POCT Blood Glucose.: 352 mg/dL (09 Sep 2018 13:08)  POCT Blood Glucose.: 240 mg/dL (09 Sep 2018 08:57)  POCT Blood Glucose.: 381 mg/dL (08 Sep 2018 21:41)          Calcium, Total Serum: 10.4 (09-09 @ 04:20)  Calcium, Total Serum: 10.1 (09-08 @ 03:15)  Vitamin D, 25-Hydroxy: 15.8 <L> (09-08 @ 03:15)  Vitamin D, 1, 25-Dihydroxy: 18.4 <L> (09-08 @ 03:15)          09-09    135  |  99  |  54<H>  ----------------------------<  258<H>  4.4   |  20<L>  |  1.76<H>    Ca    10.4      09 Sep 2018 04:20  Mg     2.4     09-09                          11.5   9.30  )-----------( 288      ( 09 Sep 2018 04:20 )             34.6     Medications  MEDICATIONS  (STANDING):  aspirin  chewable 81 milliGRAM(s) Oral daily  atorvastatin 40 milliGRAM(s) Oral at bedtime  dextrose 5%. 1000 milliLiter(s) (50 mL/Hr) IV Continuous <Continuous>  dextrose 50% Injectable 12.5 Gram(s) IV Push once  dextrose 50% Injectable 25 Gram(s) IV Push once  dextrose 50% Injectable 25 Gram(s) IV Push once  heparin  Injectable 5000 Unit(s) SubCutaneous every 8 hours  insulin glargine Injectable (LANTUS) 66 Unit(s) SubCutaneous at bedtime  insulin lispro (HumaLOG) corrective regimen sliding scale   SubCutaneous at bedtime  insulin lispro (HumaLOG) corrective regimen sliding scale   SubCutaneous three times a day with meals  insulin lispro Injectable (HumaLOG) 24 Unit(s) SubCutaneous three times a day before meals  levothyroxine 50 MICROGram(s) Oral daily  metoprolol tartrate 12.5 milliGRAM(s) Oral two times a day  ticagrelor 90 milliGRAM(s) Oral two times a day      Physical Exam  General: Patient comfortable in bed  Vital Signs Last 12 Hrs  T(F): 98.1 (09-09-18 @ 14:01), Max: 98.1 (09-09-18 @ 14:01)  HR: 73 (09-09-18 @ 14:01) (73 - 73)  BP: 113/59 (09-09-18 @ 14:01) (113/59 - 113/59)  BP(mean): --  RR: 18 (09-09-18 @ 14:01) (18 - 18)  SpO2: 100% (09-09-18 @ 14:01) (100% - 100%)  Neck: No palpable thyroid nodules.  CVS: S1S2, No murmurs  Respiratory: No wheezing, no crepitations  GI: Abdomen soft, bowel sounds positive  Musculoskeletal:  edema lower extremities.   Skin: No skin rashes, no ecchymosis    Diagnostics

## 2018-09-09 NOTE — PROGRESS NOTE ADULT - PROBLEM SELECTOR PLAN 1
Will increase Lantus to 66 units at bed time.  Will increase Humalog to 24 units before each meal in addition to Humalog correction scale coverage.  Patient counseled for compliance with consistent low carb diet.

## 2018-09-09 NOTE — PROGRESS NOTE ADULT - ASSESSMENT
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 Patient was hypoxic got admitted to MICU being treated for CHF and pneumonia

## 2018-09-09 NOTE — PROGRESS NOTE ADULT - SUBJECTIVE AND OBJECTIVE BOX
Patient denies CP, SOB Review of systems otherwise (-)    acetaminophen   Tablet .. 650 milliGRAM(s) Oral every 6 hours PRN  aluminum hydroxide/magnesium hydroxide/simethicone Suspension 30 milliLiter(s) Oral every 4 hours PRN  aspirin  chewable 81 milliGRAM(s) Oral daily  atorvastatin 40 milliGRAM(s) Oral at bedtime  dextrose 40% Gel 15 Gram(s) Oral once PRN  dextrose 5%. 1000 milliLiter(s) IV Continuous <Continuous>  dextrose 50% Injectable 12.5 Gram(s) IV Push once  dextrose 50% Injectable 25 Gram(s) IV Push once  dextrose 50% Injectable 25 Gram(s) IV Push once  glucagon  Injectable 1 milliGRAM(s) IntraMuscular once PRN  heparin  Injectable 5000 Unit(s) SubCutaneous every 8 hours  insulin glargine Injectable (LANTUS) 66 Unit(s) SubCutaneous at bedtime  insulin lispro (HumaLOG) corrective regimen sliding scale   SubCutaneous at bedtime  insulin lispro (HumaLOG) corrective regimen sliding scale   SubCutaneous three times a day with meals  insulin lispro Injectable (HumaLOG) 24 Unit(s) SubCutaneous three times a day before meals  levothyroxine 50 MICROGram(s) Oral daily  metoprolol tartrate 12.5 milliGRAM(s) Oral two times a day  ticagrelor 90 milliGRAM(s) Oral two times a day                            11.5   9.30  )-----------( 288      ( 09 Sep 2018 04:20 )             34.6       Hemoglobin: 11.5 g/dL (09-09 @ 04:20)  Hemoglobin: 11.4 g/dL (09-08 @ 03:15)  Hemoglobin: 12.3 g/dL (09-07 @ 11:00)  Hemoglobin: 12.3 g/dL (09-06 @ 06:47)  Hemoglobin: 12.5 g/dL (09-05 @ 07:00)      09-09    135  |  99  |  54<H>  ----------------------------<  258<H>  4.4   |  20<L>  |  1.76<H>    Ca    10.4      09 Sep 2018 04:20  Mg     2.4     09-09      Creatinine Trend: 1.76<--, 2.13<--, 1.94<--, 1.71<--, 1.63<--, 1.73<--    COAGS:     CARDIAC MARKERS ( 07 Sep 2018 11:00 )  x     / x     / 95 u/L / x     / x            T(C): 36.4 (09-09-18 @ 05:40), Max: 36.5 (09-08-18 @ 21:50)  HR: 71 (09-09-18 @ 05:40) (71 - 78)  BP: 111/64 (09-09-18 @ 05:40) (111/64 - 117/69)  RR: 17 (09-09-18 @ 05:40) (16 - 18)  SpO2: 100% (09-09-18 @ 05:40) (98% - 100%)  Wt(kg): --    I&O's Summary    08 Sep 2018 07:01  -  09 Sep 2018 07:00  --------------------------------------------------------  IN: 600 mL / OUT: 0 mL / NET: 600 mL    09 Sep 2018 07:01  -  09 Sep 2018 13:39  --------------------------------------------------------  IN: 440 mL / OUT: 0 mL / NET: 440 mL    Gen: Appears well in NAD  HEENT:  (-)icterus (-)pallor  CV: N S1 S2 1/6 YUSUF (+)2 Pulses B/l  Resp:  Clear to ausculatation B/L, normal effort  GI: (+) BS Soft, NT, ND  Lymph:  (-)Edema, (-)obvious lymphadenopathy  Skin: Warm to touch, Normal turgor  Psych: Appropriate mood and affect      DATA:    < from: TTE with Doppler (w/Cont) (01.23.18 @ 12:31) >  CONCLUSIONS:  1. Mitral annular calcification, otherwise normal mitral  valve. Mild-moderate mitral regurgitation.  2. Calcified trileaflet aortic valve with normal opening.  Mild aortic regurgitation.  3. Normal left ventricular internal dimensions and wall  thicknesses.  4. Endocardium not well visualized; grossly severe global  left ventricular systolic dysfunction.  Endocardial  visualization enhanced with intravenous injection of echo  contrast (Definity).  5. The right ventricle is not well visualized; grossly  normal right ventricular systolic function.  *** Compared with echocardiogram of 11/25/2017, no  significant changes noted.  ------------------------------------------------------------------------  Confirmed on  1/23/2018 - 14:35:49 by Jose Lucia M.D.    Culture - Blood (09.01.18 @ 04:45)    Culture - Blood:   NO ORGANISMS ISOLATED    Specimen Source: BLOOD VENOUS      < from: Xray Foot AP + Lateral, Left (09.05.18 @ 17:56) >  IMPRESSION:  No tracking soft tissue gas collections, radiopaque foreign bodies, or   gross radiographic evidence for osteomyelitis.    No fractures or dislocations.    Tarsometatarsal alignment maintained without evidence for a Lisfranc  injury.    Preserved visualized joint spaces. No joint margin erosions or   intra-articular or periarticular calcifications.    No lytic or blastic lesions.    Scant faint vascular calcifications.    < end of copied text >    < from: TTE with Doppler (w/Cont) (09.06.18 @ 15:21) >  CONCLUSIONS: EF 37%  1. Mitral annular calcification, otherwise normal mitral  valve. Mild mitral regurgitation.  2. Normal left ventricular internal dimensions and wall  thicknesses.  3. Endocardium not well visualized; grossly moderate to  severe global left ventricular systolic dysfunction.  The  inferolateral wall appears particularly hypokinetic.  Endocardial visualization enhanced with intravenous  injection of echo contrast (Definity).  4. The right ventricle is not well visualized; grossly  normal right ventricular systolic function.    < end of copied text >      ASSESSMENT/PLAN: 73 y/o Female PMH HTN, mild CKD, Hyperlipidemia, DM-II, CAD s/p 3V CABG  (LIMA to LAD, SVG to OM, SVG to PDA) at [facility] 2014, s/p CORNELIA TO RI 11/17/17, NSTEMI 12/2017 s/p LHC on 12/5 and CORNELIA to dRamus, TTE at that time revealed grossly moderate to severe LV dysfunction with hypokinesis of the inferior, lateral and inferolateral walls with an EF of 36% s/p LHC on 2/6  with no new OBS CAD, managed medically, admitted to MICU with SOB, Cough, Pulm edema, acute on chronic systolic HF and ?PNA. C/O chest pressure and positive troponin. She has a KNOWN  of the RCA fed by LAD collaterals. This is likely the culprit of her mild EKG changes and mild troponin elevation.     -- she is euvolemic - but given MERVIN Lasix was held, resume lasix when ok with Renal   -- was on abx for possible pna - completed 5 day course of Azithro/Rocephin  -- Repeat TTE noted - EF 37% - no need for ICD      - c/w BB. no ACE-I 2/2 CKD  -- PT eval for left foot pain - xrays with no acute fx and no evidence of infection --Reccs Home PT at DC  --uncontrolled DM-- ENDO eval with Dr Banks  -- D/C plan if ok with renal    Vargas Adame MD, FACC

## 2018-09-09 NOTE — PROGRESS NOTE ADULT - ASSESSMENT
Assessment  DMT2: 72y Female with DM T2 with hyperglycemia started on large dose basal bolus insulin, blood sugars still running high,  no hypoglycemic episode,  eating meals,  non compliant with low carb diet.  Pulmonary Edema/CAD: On medications, stable, monitored.  Hypothyroidism:  On synthroid 50  mcg po daily, asymptomatic.  HTN: Controlled, On med.

## 2018-09-09 NOTE — PROGRESS NOTE ADULT - PROBLEM SELECTOR PLAN 1
MERVIN likely sec to uncontrolled hyperglycemia on lasix. last dose of lasix 9/7, endo consulted for DM Control  poor EF 37%.   Lasix on hold  Renal function has improved to its baseline.    monitor bmp  avoid nephrotoxic agents

## 2018-09-10 ENCOUNTER — TRANSCRIPTION ENCOUNTER (OUTPATIENT)
Age: 73
End: 2018-09-10

## 2018-09-10 VITALS
OXYGEN SATURATION: 98 % | DIASTOLIC BLOOD PRESSURE: 59 MMHG | RESPIRATION RATE: 18 BRPM | TEMPERATURE: 98 F | SYSTOLIC BLOOD PRESSURE: 114 MMHG | HEART RATE: 76 BPM

## 2018-09-10 LAB
BASOPHILS # BLD AUTO: 0.09 K/UL — SIGNIFICANT CHANGE UP (ref 0–0.2)
BASOPHILS NFR BLD AUTO: 0.9 % — SIGNIFICANT CHANGE UP (ref 0–2)
BUN SERPL-MCNC: 50 MG/DL — HIGH (ref 7–23)
CALCIUM SERPL-MCNC: 10 MG/DL — SIGNIFICANT CHANGE UP (ref 8.4–10.5)
CHLORIDE SERPL-SCNC: 102 MMOL/L — SIGNIFICANT CHANGE UP (ref 98–107)
CO2 SERPL-SCNC: 19 MMOL/L — LOW (ref 22–31)
CREAT SERPL-MCNC: 1.62 MG/DL — HIGH (ref 0.5–1.3)
EOSINOPHIL # BLD AUTO: 0.52 K/UL — HIGH (ref 0–0.5)
EOSINOPHIL NFR BLD AUTO: 5.4 % — SIGNIFICANT CHANGE UP (ref 0–6)
GLUCOSE SERPL-MCNC: 205 MG/DL — HIGH (ref 70–99)
HCT VFR BLD CALC: 34.7 % — SIGNIFICANT CHANGE UP (ref 34.5–45)
HGB BLD-MCNC: 11.7 G/DL — SIGNIFICANT CHANGE UP (ref 11.5–15.5)
IMM GRANULOCYTES # BLD AUTO: 0.16 # — SIGNIFICANT CHANGE UP
IMM GRANULOCYTES NFR BLD AUTO: 1.7 % — HIGH (ref 0–1.5)
LYMPHOCYTES # BLD AUTO: 2.22 K/UL — SIGNIFICANT CHANGE UP (ref 1–3.3)
LYMPHOCYTES # BLD AUTO: 23.2 % — SIGNIFICANT CHANGE UP (ref 13–44)
MAGNESIUM SERPL-MCNC: 2.3 MG/DL — SIGNIFICANT CHANGE UP (ref 1.6–2.6)
MCHC RBC-ENTMCNC: 30.2 PG — SIGNIFICANT CHANGE UP (ref 27–34)
MCHC RBC-ENTMCNC: 33.7 % — SIGNIFICANT CHANGE UP (ref 32–36)
MCV RBC AUTO: 89.4 FL — SIGNIFICANT CHANGE UP (ref 80–100)
MONOCYTES # BLD AUTO: 0.86 K/UL — SIGNIFICANT CHANGE UP (ref 0–0.9)
MONOCYTES NFR BLD AUTO: 9 % — SIGNIFICANT CHANGE UP (ref 2–14)
NEUTROPHILS # BLD AUTO: 5.7 K/UL — SIGNIFICANT CHANGE UP (ref 1.8–7.4)
NEUTROPHILS NFR BLD AUTO: 59.8 % — SIGNIFICANT CHANGE UP (ref 43–77)
NRBC # FLD: 0 — SIGNIFICANT CHANGE UP
PLATELET # BLD AUTO: 303 K/UL — SIGNIFICANT CHANGE UP (ref 150–400)
PMV BLD: 8.9 FL — SIGNIFICANT CHANGE UP (ref 7–13)
POTASSIUM SERPL-MCNC: 4.3 MMOL/L — SIGNIFICANT CHANGE UP (ref 3.5–5.3)
POTASSIUM SERPL-SCNC: 4.3 MMOL/L — SIGNIFICANT CHANGE UP (ref 3.5–5.3)
PTH-INTACT SERPL-MCNC: 43.55 PG/ML — SIGNIFICANT CHANGE UP (ref 15–65)
RBC # BLD: 3.88 M/UL — SIGNIFICANT CHANGE UP (ref 3.8–5.2)
RBC # FLD: 13.4 % — SIGNIFICANT CHANGE UP (ref 10.3–14.5)
SODIUM SERPL-SCNC: 136 MMOL/L — SIGNIFICANT CHANGE UP (ref 135–145)
WBC # BLD: 9.55 K/UL — SIGNIFICANT CHANGE UP (ref 3.8–10.5)
WBC # FLD AUTO: 9.55 K/UL — SIGNIFICANT CHANGE UP (ref 3.8–10.5)

## 2018-09-10 RX ORDER — METOPROLOL TARTRATE 50 MG
1 TABLET ORAL
Qty: 0 | Refills: 1 | DISCHARGE
Start: 2018-09-10 | End: 2018-11-08

## 2018-09-10 RX ORDER — INSULIN GLULISINE 100 [IU]/ML
24 INJECTION, SOLUTION SUBCUTANEOUS
Qty: 1 | Refills: 0 | OUTPATIENT
Start: 2018-09-10 | End: 2018-10-09

## 2018-09-10 RX ORDER — METOPROLOL TARTRATE 50 MG
0.5 TABLET ORAL
Qty: 30 | Refills: 1 | OUTPATIENT
Start: 2018-09-10 | End: 2018-11-08

## 2018-09-10 RX ORDER — INSULIN GLULISINE 100 [IU]/ML
24 INJECTION, SOLUTION SUBCUTANEOUS
Qty: 0 | Refills: 0 | COMMUNITY

## 2018-09-10 RX ORDER — INSULIN GLARGINE 100 [IU]/ML
66 INJECTION, SOLUTION SUBCUTANEOUS
Qty: 1 | Refills: 0 | OUTPATIENT
Start: 2018-09-10 | End: 2018-10-09

## 2018-09-10 RX ORDER — INSULIN LISPRO 100/ML
24 VIAL (ML) SUBCUTANEOUS
Qty: 1 | Refills: 0 | OUTPATIENT
Start: 2018-09-10 | End: 2018-10-09

## 2018-09-10 RX ADMIN — Medication 12.5 MILLIGRAM(S): at 05:41

## 2018-09-10 RX ADMIN — TICAGRELOR 90 MILLIGRAM(S): 90 TABLET ORAL at 05:41

## 2018-09-10 RX ADMIN — Medication 4: at 09:06

## 2018-09-10 RX ADMIN — Medication 24 UNIT(S): at 09:06

## 2018-09-10 RX ADMIN — Medication 4: at 12:51

## 2018-09-10 RX ADMIN — HEPARIN SODIUM 5000 UNIT(S): 5000 INJECTION INTRAVENOUS; SUBCUTANEOUS at 05:41

## 2018-09-10 RX ADMIN — Medication 50 MICROGRAM(S): at 05:42

## 2018-09-10 RX ADMIN — Medication 81 MILLIGRAM(S): at 12:51

## 2018-09-10 RX ADMIN — HEPARIN SODIUM 5000 UNIT(S): 5000 INJECTION INTRAVENOUS; SUBCUTANEOUS at 12:51

## 2018-09-10 RX ADMIN — Medication 24 UNIT(S): at 12:51

## 2018-09-10 NOTE — PROGRESS NOTE ADULT - PROBLEM SELECTOR PROBLEM 2
SOB (shortness of breath)
CKD (chronic kidney disease) stage 3, GFR 30-59 ml/min
SOB (shortness of breath)
SOB (shortness of breath)
CAD (coronary artery disease)
CAD (coronary artery disease)
CKD (chronic kidney disease) stage 3, GFR 30-59 ml/min
SOB (shortness of breath)
CKD (chronic kidney disease) stage 3, GFR 30-59 ml/min

## 2018-09-10 NOTE — DISCHARGE NOTE ADULT - HOME CARE AGENCY
[facility]     RN to start day after discharge/ HD, PT  / [facility] Aide reinstated for 9/11/18

## 2018-09-10 NOTE — DISCHARGE NOTE ADULT - MEDICATION SUMMARY - MEDICATIONS TO TAKE
I will START or STAY ON the medications listed below when I get home from the hospital:    aspirin 81 mg oral delayed release tablet  -- 1 tab(s) by mouth once a day  -- Indication: For CAD (coronary artery disease)    acetaminophen 325 mg oral tablet  -- 2 tab(s) by mouth every 6 hours, As needed, Mild, moderate and severe pain  -- Indication: For PAIN    sodium bicarbonate 650 mg oral tablet  -- 1 tab(s) by mouth 3 times a day  -- Indication: For ANTACID    Apidra 100 units/mL subcutaneous solution  -- 24 microcurie subcutaneous 3 times a day (before meals)  -- Indication: For Diabetes mellitus, type 2    Toujeo SoloStar 300 units/mL subcutaneous solution  -- 66 unit(s) subcutaneous once a day (at bedtime) , 1 month supply   -- Indication: For Diabetes mellitus, type 2    atorvastatin 40 mg oral tablet  -- 1 tab(s) by mouth once a day  -- Indication: For HLD    ticagrelor 90 mg oral tablet  -- 1 tab(s) by mouth 2 times a day    Meds to beds  Room [address] [place] [address]  Discharge: 12/11/17 at 5pm   Pager: [phone]  -- Indication: For CAD (coronary artery disease)    metoprolol tartrate 25 mg oral tablet  -- 0.5 tab(s) by mouth 2 times a day   -- It is very important that you take or use this exactly as directed.  Do not skip doses or discontinue unless directed by your doctor.  May cause drowsiness.  Alcohol may intensify this effect.  Use care when operating dangerous machinery.  Some non-prescription drugs may aggravate your condition.  Read all labels carefully.  If a warning appears, check with your doctor before taking.  Take with food or milk.  This drug may impair the ability to drive or operate machinery.  Use care until you become familiar with its effects.    -- Indication: For HTN    alendronate 70 mg oral tablet  -- 1 tab(s) by mouth once a week  -- Indication: For OSTEOPOROSIS    furosemide 40 mg oral tablet  -- 1 tab(s) by mouth once a day    Meds to beds  Room  421A  Discharge: 12/11/17 at 5pm   Pager: [phone]  -- Indication: For DiURETIC    raNITIdine 150 mg oral capsule  -- 1 cap(s) by mouth 2 times a day  -- Indication: For GI    FeroSul 325 mg (65 mg elemental iron) oral tablet  -- 1 tab(s) by mouth once a day   -- Indication: For ANEMIA    montelukast 10 mg oral tablet  -- 1 tab(s) by mouth once a day (at bedtime)  -- Indication: For RESPIRATORY    levothyroxine 50 mcg (0.05 mg) oral tablet  -- 1 tab(s) by mouth once a day  -- Indication: For Hypothyroidism    Vol-[employer] Rx oral tablet  -- 1 tab(s) by mouth once a day  -- Indication: For VITAMIN

## 2018-09-10 NOTE — PROGRESS NOTE ADULT - PROBLEM SELECTOR PLAN 6
? etiology ? dehydration  not on supplement  PTH optimal  pending vit D 25 and vit d 1,25
? etiology ? dehydration  not on supplement  PTH optimal  pending vit D 25 and vit d 1,25
? etiology ? dehydration, improving   not on supplement  PTH optimal  Vit D low. will start vit d supplement when ca gets better

## 2018-09-10 NOTE — PROGRESS NOTE ADULT - ASSESSMENT
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 Patient was hypoxic got admitted to MICU being treated for CHF and pneumonia

## 2018-09-10 NOTE — DISCHARGE NOTE ADULT - PROVIDER TOKENS
TOKEN:'[id number]:'[id number]',FREE:[LAST:[FOLLOW UP WITH PCP],PHONE:[(   )    -],FAX:[(   )    -]]

## 2018-09-10 NOTE — DISCHARGE NOTE ADULT - ADDITIONAL INSTRUCTIONS
Follow up with PCP within 1-2 weeks of discharge.   Follow up with RENAL - Dr. Hamilton within 1-2 weeks of discharge.  Follow up with cardiology within 1-2 weeks of discharge - Continue with dual antiplatelet therapy (Aspirin and Ticagrelor). Follow up with PCP within 1-2 weeks of discharge.   Follow up with RENAL - Dr. Hamilton within 1-2 weeks of discharge.  Follow up with cardiology within 1-2 weeks of discharge (office will contact patient for the appointment) - Continue with dual antiplatelet therapy (Aspirin and Ticagrelor).

## 2018-09-10 NOTE — DISCHARGE NOTE ADULT - CARE PLAN
Principal Discharge DX:	Acute pulmonary edema Principal Discharge DX:	Acute pulmonary edema  Secondary Diagnosis:	CAD (coronary artery disease)  Secondary Diagnosis:	Diabetes mellitus, type 2  Secondary Diagnosis:	Hyperlipidemia  Secondary Diagnosis:	CKD (chronic kidney disease) stage 3, GFR 30-59 ml/min  Secondary Diagnosis:	Hypothyroidism Principal Discharge DX:	Acute pulmonary edema  Goal:	Monitor, improved.  Assessment and plan of treatment:	You were placed on bipap and eventually weaned off. You are not requiring nasal cannula at this time. You are to resume Lasix 40mg daily.  Secondary Diagnosis:	CAD (coronary artery disease)  Goal:	To be asymptomatic, to reduce risks factors such as hypertension, diabetes and hyperlipidemia to lower the risk of blood clots formation; and to prevent complications of coronary artery disease such as worsening chest pain, heart attack and death.  Assessment and plan of treatment:	Continue aspirin and Plavix, do not stop unless instructed by your physician.  Continue low salt, fat, cholesterol and carbohydrate diet. Follow up with cardiologist and primary care physician's routine appointment.  Secondary Diagnosis:	Diabetes mellitus, type 2  Goal:	To maintain a normal blood glucose level and HgA1C level < 5.7 and to prevent diabetic complications such as uncontrolled diabetes, diabetic coma, blindness, renal failure, and amputations.  Assessment and plan of treatment:	Monitor finger sticks pre-meal and bedtime, low salt, fat and carbohydrate diet, minimize glucose intake.  Exercise daily for at least 30 minutes and weight loss.  Follow up with primary care physician and endocrinologist for routine Hemoglobin A1C checks and management.  Follow up with your ophthalmologist for routine yearly vision exams.  Secondary Diagnosis:	Hyperlipidemia  Goal:	To maintain normal cholesterol levels to prevent stroke, coronary artery disease, peripheral vascular disease and heart attacks.  Assessment and plan of treatment:	Low fat diet, exercise daily and continue current medications. Follow up with primary care physician and cardiologist for management.  Secondary Diagnosis:	CKD (chronic kidney disease) stage 3, GFR 30-59 ml/min  Goal:	To prevent shortness of breath, fluid overload, and electrolytes imbalance, and to slow down worsening kidney disease.  Assessment and plan of treatment:	Continue blood pressure, cholesterol and diabetic medications. Goal of hemoglobin A1C (HgbA1C) < 7%.  Avoid nephrotoxic drugs such as nonsteroidal anti-inflammatory agents (NSAIDs).   Please follow up with your nephrologist to monitor your kidney function, continue with low protein and potassium diet.  Secondary Diagnosis:	Hypothyroidism  Goal:	Monitor.  Assessment and plan of treatment:	Take Synthroid as prescribed.

## 2018-09-10 NOTE — DISCHARGE NOTE ADULT - MEDICATION SUMMARY - MEDICATIONS TO STOP TAKING
I will STOP taking the medications listed below when I get home from the hospital:    Toprol-XL 25 mg oral tablet, extended release  -- 0.5 tab(s) by mouth once a day    Meds to beds  Room 4N [address]  Discharge: 12/11/17 at 5pm   Pager: [phone]    Toujeo SoloStar 300 units/mL subcutaneous solution  -- 20 unit(s) subcutaneous once a day, in the morning, 10 am

## 2018-09-10 NOTE — PROGRESS NOTE ADULT - REASON FOR ADMISSION
Shortness of breath

## 2018-09-10 NOTE — PROGRESS NOTE ADULT - SUBJECTIVE AND OBJECTIVE BOX
Chief complaint  Patient is a 72y old  Female who presents with a chief complaint of Shortness of breath (10 Sep 2018 11:32)   Review of systems  Patient in bed, looks comfortable, no fever, no hypoglycemia.    Labs and Fingersticks  CAPILLARY BLOOD GLUCOSE      POCT Blood Glucose.: 215 mg/dL (10 Sep 2018 12:32)  POCT Blood Glucose.: 216 mg/dL (10 Sep 2018 08:38)  POCT Blood Glucose.: 215 mg/dL (09 Sep 2018 21:24)  POCT Blood Glucose.: 307 mg/dL (09 Sep 2018 17:59)          Calcium, Total Serum: 10.0 (09-10 @ 07:15)  Calcium, Total Serum: 10.4 (09-09 @ 04:20)          09-10    136  |  102  |  50<H>  ----------------------------<  205<H>  4.3   |  19<L>  |  1.62<H>    Ca    10.0      10 Sep 2018 07:15  Mg     2.3     09-10                          11.7   9.55  )-----------( 303      ( 10 Sep 2018 07:15 )             34.7     Medications  MEDICATIONS  (STANDING):  aspirin  chewable 81 milliGRAM(s) Oral daily  atorvastatin 40 milliGRAM(s) Oral at bedtime  dextrose 5%. 1000 milliLiter(s) (50 mL/Hr) IV Continuous <Continuous>  dextrose 50% Injectable 12.5 Gram(s) IV Push once  dextrose 50% Injectable 25 Gram(s) IV Push once  dextrose 50% Injectable 25 Gram(s) IV Push once  heparin  Injectable 5000 Unit(s) SubCutaneous every 8 hours  insulin glargine Injectable (LANTUS) 66 Unit(s) SubCutaneous at bedtime  insulin lispro (HumaLOG) corrective regimen sliding scale   SubCutaneous at bedtime  insulin lispro (HumaLOG) corrective regimen sliding scale   SubCutaneous three times a day with meals  insulin lispro Injectable (HumaLOG) 24 Unit(s) SubCutaneous three times a day before meals  levothyroxine 50 MICROGram(s) Oral daily  metoprolol tartrate 12.5 milliGRAM(s) Oral two times a day  ticagrelor 90 milliGRAM(s) Oral two times a day      Physical Exam  General: Patient comfortable in bed  Vital Signs Last 12 Hrs  T(F): 97.5 (09-10-18 @ 12:53), Max: 97.8 (09-10-18 @ 05:40)  HR: 76 (09-10-18 @ 12:53) (76 - 77)  BP: 114/59 (09-10-18 @ 12:53) (110/62 - 114/59)  BP(mean): --  RR: 18 (09-10-18 @ 12:53) (18 - 18)  SpO2: 98% (09-10-18 @ 12:53) (98% - 100%)  Neck: No palpable thyroid nodules.  CVS: S1S2, No murmurs  Respiratory: No wheezing, no crepitations  GI: Abdomen soft, bowel sounds positive  Musculoskeletal:  edema lower extremities.   Skin: No skin rashes, no ecchymosis    Diagnostics    Parathyroid Hormone Intact, Serum: AM Sched. Collection: 10-Sep-2018 04:00 (09-09 @ 17:54)  PTH Related Peptide: AM Sched. Collection: 10-Sep-2018 04:00 (09-09 @ 17:54)

## 2018-09-10 NOTE — PROGRESS NOTE ADULT - PROBLEM SELECTOR PLAN 4
in setting of CKD  Monitor serum CO2
in setting of CKD  Monitor serum CO2
Will monitor calcium levels FU
Will monitor calcium levels FU
in setting of CKD  Monitor serum CO2
sec to hyperglycemia  corrected Na 136

## 2018-09-10 NOTE — PROGRESS NOTE ADULT - PROBLEM SELECTOR PROBLEM 3
Acidosis
SOB (shortness of breath)
Acidosis
Hypothyroidism
Hypothyroidism
SOB (shortness of breath)
SOB (shortness of breath)

## 2018-09-10 NOTE — PROGRESS NOTE ADULT - PROBLEM SELECTOR PLAN 1
Will continue current insulin regimen for now. Will continue monitoring FS, log, will Follow up.  Suggest to DC home on current insulin regimen, FU 2 weeks  Patient counseled for compliance with consistent low carb diet.

## 2018-09-10 NOTE — PROGRESS NOTE ADULT - PROBLEM SELECTOR PLAN 1
MERVIN likely sec to uncontrolled hyperglycemia on lasix. last dose of lasix 9/7, endo consulted for DM Control  poor EF 37%.   Lasix on hold  Renal function has improved to its baseline. cleared from renal stand point for discharge. lasix 40mg po daily can be resumed on discharge. follow up with Dr. Muhammad in 1-2wks after discharge   monitor bmp  avoid nephrotoxic agents

## 2018-09-10 NOTE — PROGRESS NOTE ADULT - PROBLEM SELECTOR PLAN 5
sec to hyperglycemia  corrected Na 136
sec to hyperglycemia
sec to hyperglycemia
? etiology ? dehydration  not on supplement  check PTH, vit D 25 and vit d 1,25

## 2018-09-10 NOTE — PROGRESS NOTE ADULT - PROVIDER SPECIALTY LIST ADULT
Cardiology
Endocrinology
Endocrinology
MICU
MICU
Nephrology
Cardiology
Nephrology

## 2018-09-10 NOTE — PROGRESS NOTE ADULT - PROBLEM SELECTOR PLAN 3
in setting of CKD  Monitor serum CO2
EF 37% in 9/2018  Lasix on hold, but will need to restart once diabetes is better controlled  Monitor daily weights   f/u cardio
in setting of CKD  Monitor serum CO2
Continue current Synthroid dose, repeat TFTs in 6 weeks.
Continue current Synthroid dose, repeat TFTs in 6 weeks.
EF 37% in 9/2018  Lasix on hold, DM better, resume lasix 40mg po daily  Monitor daily weights   f/u cardio
Worse today.  Monitor
in setting of CKD  Monitor serum CO2
in setting of CKD  Monitor serum CO2
EF 37% in 9/2018  on lasix 40mg PO   Monitor daily weights   f/u cardio

## 2018-09-10 NOTE — PROGRESS NOTE ADULT - ASSESSMENT
Assessment  DMT2: 72y Female with DM T2 with hyperglycemia started on large dose basal bolus insulin, insulin dose was adjusted, blood sugars trending down,  no hypoglycemic episode,  eating meals,  non compliant with low carb diet.  Pulmonary Edema/CAD: On medications, stable, monitored.  Hypothyroidism:  On synthroid 50  mcg po daily, asymptomatic.  HTN: Controlled, On med.

## 2018-09-10 NOTE — DISCHARGE NOTE ADULT - HOSPITAL COURSE
- she is euvolemic - but given MERVIN Lasix was held,  -- resume maintenance oral lasix when ok with renal   -- was on abx for possible pna - completed 5 day course of Azithro/Rocephin  -- Repeat TTE noted - EF 37% - no need for ICD   -- continue with metoprolol for cardiomyopathy; off ace secondary to renal failure  -- continue with dapt for history of CORNELIA   -- dc home today if ok with renal and other consultants 73 y/o Female PMH HTN, mild CKD, Hyperlipidemia, DM-II, CAD s/p 3V CABG  (LIMA to LAD, SVG to OM, SVG to PDA) at [facility] 2014, s/p CORNELIA TO [facility] 11/17/17, NSTEMI 12/2017 s/p [facility] on 12/5 and CORNELIA to [facility], TTE at that time revealed grossly moderate to severe LV dysfunction with hypokinesis of the inferior, lateral and inferolateral walls with an EF of 36% s/p [facility] on 2/6  with no new OBS CAD, managed medically, admitted to MICU with SOB, Cough, Pulm edema, acute on chronic systolic HF and ?PNA. C/O chest pressure and positive troponin. She has a KNOWN  of the RCA fed by LAD collaterals. This is likely the culprit of her mild EKG changes and mild troponin elevation.     - she is euvolemic - but given MERVIN Lasix was held,  -- resume maintenance oral lasix when ok with renal   -- was on abx for possible pna - completed 5 day course of Azithro/Rocephin  -- Repeat TTE noted - EF 37% - no need for ICD   -- continue with metoprolol for cardiomyopathy; off ace secondary to renal failure  -- continue with dapt for history of CORNELIA   -- dc home today if ok with renal and other consultants 73 y/o Female PMH HTN, mild CKD, Hyperlipidemia, DM-II, CAD s/p 3V CABG  (LIMA to LAD, SVG to OM, SVG to PDA) at [facility] 2014, s/p CORNELIA TO [facility] 11/17/17, NSTEMI 12/2017 s/p [facility] on 12/5 and CORNELIA to [facility], TTE at that time revealed grossly moderate to severe LV dysfunction with hypokinesis of the inferior, lateral and inferolateral walls with an EF of 36% s/p [facility] on 2/6  with no new OBS CAD, managed medically, admitted to MICU with SOB, Cough, Pulm edema, acute on chronic systolic HF and ?PNA who presented with complaints of chest pressure and positive troponins. She has a KNOWN  of the RCA fed by LAD collaterals which was likely the culprit of her mild EKG changes and mild troponin elevation. She was admitted to the MICU for respiratory failure that required bipap. Patient was eventually weaned off of bipap to nasal cannula, and at present, good saturation without the use of any oxygen. Echocardiogram was done, which showed an EF of 37% - patient does not need ICD at this time. To continue Metoprolol for cardiomyopathy. She was treated with a 5 day course of Azithromycin/Rocephin for possible pneumonia. Patient noted to have MERVIN on CKD for which Lasix was held and renal was consulted. MERVIN thought to be secondary to uncontrolled hyperglycemia. Lasix was held but to be resumed upon discharge. Baseline Cr 1.5-1.8 with Cr of 1.6 today. Noted to have hypercalcemia, which has improved. To be started on Vitamin D - outpatient, given improvement. Endocrine was consulted regarding diabetes management.     Today, patient seen and evaluated for discharge with no acute somatic complaints at present. Euvolemic on physical exam.  Medically cleared/stable for discharge as per Dr. Ga with close outpatient follow up within 1-2 weeks of discharge. To continue dual antiplatelet therapy.

## 2018-09-10 NOTE — PROGRESS NOTE ADULT - PROBLEM SELECTOR PROBLEM 1
MERVIN (acute kidney injury)
CKD (chronic kidney disease) stage 3, GFR 30-59 ml/min
Diabetes mellitus, type 2
Diabetes mellitus, type 2
MERVIN (acute kidney injury)
CKD (chronic kidney disease) stage 3, GFR 30-59 ml/min
MERVIN (acute kidney injury)

## 2018-09-10 NOTE — DISCHARGE NOTE ADULT - PLAN OF CARE
Monitor, improved. You were placed on bipap and eventually weaned off. You are not requiring nasal cannula at this time. You are to resume Lasix 40mg daily. To be asymptomatic, to reduce risks factors such as hypertension, diabetes and hyperlipidemia to lower the risk of blood clots formation; and to prevent complications of coronary artery disease such as worsening chest pain, heart attack and death. Continue aspirin and Plavix, do not stop unless instructed by your physician.  Continue low salt, fat, cholesterol and carbohydrate diet. Follow up with cardiologist and primary care physician's routine appointment. To maintain a normal blood glucose level and HgA1C level < 5.7 and to prevent diabetic complications such as uncontrolled diabetes, diabetic coma, blindness, renal failure, and amputations. Monitor finger sticks pre-meal and bedtime, low salt, fat and carbohydrate diet, minimize glucose intake.  Exercise daily for at least 30 minutes and weight loss.  Follow up with primary care physician and endocrinologist for routine Hemoglobin A1C checks and management.  Follow up with your ophthalmologist for routine yearly vision exams. To maintain normal cholesterol levels to prevent stroke, coronary artery disease, peripheral vascular disease and heart attacks. Low fat diet, exercise daily and continue current medications. Follow up with primary care physician and cardiologist for management. To prevent shortness of breath, fluid overload, and electrolytes imbalance, and to slow down worsening kidney disease. Continue blood pressure, cholesterol and diabetic medications. Goal of hemoglobin A1C (HgbA1C) < 7%.  Avoid nephrotoxic drugs such as nonsteroidal anti-inflammatory agents (NSAIDs).   Please follow up with your nephrologist to monitor your kidney function, continue with low protein and potassium diet. Monitor. Take Synthroid as prescribed.

## 2018-09-10 NOTE — DISCHARGE NOTE ADULT - PATIENT PORTAL LINK FT
You can access the [facility] Patient Portal, offered by [facility], by registering with the following website: [email or web address]

## 2018-09-10 NOTE — PROGRESS NOTE ADULT - PROBLEM SELECTOR PLAN 2
EF 32%  Currently being  for CHF and Pneumonia  clinically improving.
Pt with CKD sec to long standing DM/HTN and CHF.   Baseline SCr 1.5-1.8  Repeat ECHO done EF 37%, no more intervention per cardio  Monitor renal function and electrolyte.   avoid nephrotoxics, NSAIDS RCA.
EF 32%  Being treated for CHF and Pneumonia  clinically improving.
EF 32% in 1/2018  Transitioned to PO lasix 40mg PO today  Monitor daily weights   Repeat ECHO as per Cardio-- if low EF may need Cath  Pt at 26.1% risk of Contrast Induced Nephropathy and 1.09 % risk of needing dialysis   Will need to optimize patient prior to procedure.
EF 32%  Being treated for CHF and Pneumonia  clinically improving.
EF 32% in 1/2018  on lasix 40mg IV QD  Monitor daily weights   Repeat ECHO as per Cardio-- if low EF may need Cath  Pt at 26.1% risk of Contrast Induced Nephropathy and 1.09 % risk of needing dialysis   Will need to optimize patient prior to procedure
EF 37% in 9/2018  on lasix 40mg PO   Monitor daily weights   f/u cardio
On medications, monitored and followed up by primary/cardiology team
On medications, monitored and followed up by primary/cardiology team
Pt with CKD sec to long standing DM/HTN and CHF.   Baseline SCr 1.5-1.8  Repeat ECHO done EF 37%, no more intervention per cardio  Monitor renal function and electrolyte.   avoid nephrotoxics, NSAIDS RCA.
Pt with CKD sec to long standing DM/HTN and CHF.   Baseline SCr 1.5-1.8  Repeat ECHO done EF 37%, no more intervention per cardio  Monitor renal function and electrolyte.   avoid nephrotoxics, NSAIDS RCA.

## 2018-09-10 NOTE — DISCHARGE NOTE ADULT - SECONDARY DIAGNOSIS.
CAD (coronary artery disease) Diabetes mellitus, type 2 Hyperlipidemia CKD (chronic kidney disease) stage 3, GFR 30-59 ml/min Hypothyroidism

## 2018-09-10 NOTE — PROGRESS NOTE ADULT - SUBJECTIVE AND OBJECTIVE BOX
S: no chest pain or sob     Heart: normal S1, S2, RRR, no m/r/g  Lungs: cta b/l  Abd: soft nT, nD  Ext: no edema       DATA:    < from: TTE with Doppler (w/Cont) (01.23.18 @ 12:31) >  CONCLUSIONS:  1. Mitral annular calcification, otherwise normal mitral  valve. Mild-moderate mitral regurgitation.  2. Calcified trileaflet aortic valve with normal opening.  Mild aortic regurgitation.  3. Normal left ventricular internal dimensions and wall  thicknesses.  4. Endocardium not well visualized; grossly severe global  left ventricular systolic dysfunction.  Endocardial  visualization enhanced with intravenous injection of echo  contrast (Definity).  5. The right ventricle is not well visualized; grossly  normal right ventricular systolic function.  *** Compared with echocardiogram of 11/25/2017, no  significant changes noted.  ------------------------------------------------------------------------  Confirmed on  1/23/2018 - 14:35:49 by Jose Lucia M.D.    Culture - Blood (09.01.18 @ 04:45)    Culture - Blood:   NO ORGANISMS ISOLATED    Specimen Source: BLOOD VENOUS      < from: Xray Foot AP + Lateral, Left (09.05.18 @ 17:56) >  IMPRESSION:  No tracking soft tissue gas collections, radiopaque foreign bodies, or   gross radiographic evidence for osteomyelitis.    No fractures or dislocations.    Tarsometatarsal alignment maintained without evidence for a Lisfranc  injury.    Preserved visualized joint spaces. No joint margin erosions or   intra-articular or periarticular calcifications.    No lytic or blastic lesions.    Scant faint vascular calcifications.    < end of copied text >    < from: TTE with Doppler (w/Cont) (09.06.18 @ 15:21) >  CONCLUSIONS: EF 37%  1. Mitral annular calcification, otherwise normal mitral  valve. Mild mitral regurgitation.  2. Normal left ventricular internal dimensions and wall  thicknesses.  3. Endocardium not well visualized; grossly moderate to  severe global left ventricular systolic dysfunction.  The  inferolateral wall appears particularly hypokinetic.  Endocardial visualization enhanced with intravenous  injection of echo contrast (Definity).  4. The right ventricle is not well visualized; grossly  normal right ventricular systolic function.    < end of copied text >    Tele: SR 70's       ASSESSMENT/PLAN: 71 y/o Female PMH HTN, mild CKD, Hyperlipidemia, DM-II, CAD s/p 3V CABG  (LIMA to LAD, SVG to OM, SVG to PDA) at [facility] 2014, s/p CORNELIA TO [place] 11/17/17, NSTEMI 12/2017 s/p LHC on 12/5 and CORNELIA to [facility], TTE at that time revealed grossly moderate to severe LV dysfunction with hypokinesis of the inferior, lateral and inferolateral walls with an EF of 36% s/p LHC on 2/6  with no new OBS CAD, managed medically, admitted to MICU with SOB, Cough, Pulm edema, acute on chronic systolic HF and ?PNA. C/O chest pressure and positive troponin. She has a KNOWN  of the RCA fed by LAD collaterals. This is likely the culprit of her mild EKG changes and mild troponin elevation.     -- she is euvolemic - but given MERVIN Lasix was held,  -- resume maintenance oral lasix when ok with renal   -- was on abx for possible pna - completed 5 day course of Azithro/Rocephin  -- Repeat TTE noted - EF 37% - no need for ICD   -- continue with metoprolol for cardiomyopathy; off ace secondary to renal failure  -- continue with dapt for history of CORNELIA   -- dc home today if ok with renal and other consultants    Corie Ga MD

## 2018-09-10 NOTE — PROGRESS NOTE ADULT - SUBJECTIVE AND OBJECTIVE BOX
[facility] [place] [facility]   MD RAHEEL SHAH MD ANGELA WONG, PA    TEL:  OFFICE: [phone]  DR SANTOYO CELL: [phone]  DR. NGUYEN CELL: [phone]  UMM KENDALL CELL: [phone]        Patient is a 72y old  Female who presents with a chief complaint of Shortness of breath (10 Sep 2018 11:19)      Patient seen and examined at bedside. still c/o "heavy" breathing    VITALS:  T(F): 97.8 (09-10-18 @ 05:40), Max: 98.1 (09-09-18 @ 14:01)  HR: 77 (09-10-18 @ 05:40)  BP: 110/62 (09-10-18 @ 05:40)  RR: 18 (09-10-18 @ 05:40)  SpO2: 100% (09-10-18 @ 05:40)  Wt(kg): --    09-09 @ 07:01  -  09-10 @ 07:00  --------------------------------------------------------  IN: 490 mL / OUT: 400 mL / NET: 90 mL          PHYSICAL EXAM:  Constitutional: NAD, comfortable, eating breakfast  Neck: No JVD  Respiratory: CTAB, no wheezes, rales or rhonchi  Cardiovascular: S1, S2, RRR  Gastrointestinal: BS+, soft, NT/ND  Extremities: No peripheral edema    Hospital Medications:   MEDICATIONS  (STANDING):  aspirin  chewable 81 milliGRAM(s) Oral daily  atorvastatin 40 milliGRAM(s) Oral at bedtime  dextrose 5%. 1000 milliLiter(s) (50 mL/Hr) IV Continuous <Continuous>  dextrose 50% Injectable 12.5 Gram(s) IV Push once  dextrose 50% Injectable 25 Gram(s) IV Push once  dextrose 50% Injectable 25 Gram(s) IV Push once  heparin  Injectable 5000 Unit(s) SubCutaneous every 8 hours  insulin glargine Injectable (LANTUS) 66 Unit(s) SubCutaneous at bedtime  insulin lispro (HumaLOG) corrective regimen sliding scale   SubCutaneous at bedtime  insulin lispro (HumaLOG) corrective regimen sliding scale   SubCutaneous three times a day with meals  insulin lispro Injectable (HumaLOG) 24 Unit(s) SubCutaneous three times a day before meals  levothyroxine 50 MICROGram(s) Oral daily  metoprolol tartrate 12.5 milliGRAM(s) Oral two times a day  ticagrelor 90 milliGRAM(s) Oral two times a day      LABS:  09-10    136  |  102  |  50<H>  ----------------------------<  205<H>  4.3   |  19<L>  |  1.62<H>    Ca    10.0      10 Sep 2018 07:15  Mg     2.3     09-10      Creatinine Trend: 1.62 <--, 1.76 <--, 2.13 <--, 1.94 <--, 1.71 <--, 1.63 <--, 1.73 <--      calcium--  intact pth--  parathyroid hormone intact, serum43.55                            11.7   9.55  )-----------( 303      ( 10 Sep 2018 07:15 )             34.7     Urine Studies:  Urinalysis - [09-01-18 @ 04:29]      Color YELLOW / Appearance CLEAR / SG 1.015 / pH 6.5      Gluc 250 / Ketone NEGATIVE  / Bili NEGATIVE / Urobili NORMAL       Blood MODERATE / Protein MODERATE / Leuk Est NEGATIVE / Nitrite NEGATIVE      RBC 5-10 / WBC 2-5 / Hyaline  / Gran  / Sq Epi OCC. / Non Sq Epi  / Bacteria       Iron 40, TIBC 377, %sat --      [01-20-18 @ 06:06]  Ferritin 38.35      [01-20-18 @ 06:06]  PTH 43.55 (Ca --)      [09-10-18 @ 07:15]   --  PTH 36.60 (Ca --)      [09-08-18 @ 03:15]   --  Vitamin D (25OH) 15.8      [09-08-18 @ 03:15]  HbA1c 7.4      [09-02-18 @ 02:30]  TSH 0.55      [01-19-18 @ 06:45]  Lipid: chol 130, , HDL 30, LDL 70      [01-19-18 @ 06:45]        RADIOLOGY & ADDITIONAL STUDIES:

## 2018-09-14 LAB — PTH RELATED PROT SERPL-MCNC: <2 — SIGNIFICANT CHANGE UP

## 2018-09-17 DIAGNOSIS — Z71.89 OTHER SPECIFIED COUNSELING: ICD-10-CM

## 2018-09-25 ENCOUNTER — INPATIENT (INPATIENT)
Facility: HOSPITAL | Age: 73
LOS: 7 days | Discharge: HOME HEALTH | End: 2018-10-03
Attending: INTERNAL MEDICINE | Admitting: INTERNAL MEDICINE
Payer: MEDICARE

## 2018-09-25 VITALS
WEIGHT: 125.22 LBS | DIASTOLIC BLOOD PRESSURE: 70 MMHG | OXYGEN SATURATION: 99 % | HEART RATE: 74 BPM | TEMPERATURE: 98 F | RESPIRATION RATE: 24 BRPM | SYSTOLIC BLOOD PRESSURE: 100 MMHG

## 2018-09-25 DIAGNOSIS — I25.10 ATHEROSCLEROTIC HEART DISEASE OF NATIVE CORONARY ARTERY WITHOUT ANGINA PECTORIS: ICD-10-CM

## 2018-09-25 DIAGNOSIS — I50.23 ACUTE ON CHRONIC SYSTOLIC (CONGESTIVE) HEART FAILURE: ICD-10-CM

## 2018-09-25 DIAGNOSIS — Z95.1 PRESENCE OF AORTOCORONARY BYPASS GRAFT: Chronic | ICD-10-CM

## 2018-09-25 DIAGNOSIS — E11.649 TYPE 2 DIABETES MELLITUS WITH HYPOGLYCEMIA WITHOUT COMA: ICD-10-CM

## 2018-09-25 DIAGNOSIS — R06.02 SHORTNESS OF BREATH: ICD-10-CM

## 2018-09-25 DIAGNOSIS — E03.9 HYPOTHYROIDISM, UNSPECIFIED: ICD-10-CM

## 2018-09-25 DIAGNOSIS — I21.4 NON-ST ELEVATION (NSTEMI) MYOCARDIAL INFARCTION: ICD-10-CM

## 2018-09-25 DIAGNOSIS — N18.3 CHRONIC KIDNEY DISEASE, STAGE 3 (MODERATE): ICD-10-CM

## 2018-09-25 DIAGNOSIS — E87.2 ACIDOSIS: ICD-10-CM

## 2018-09-25 DIAGNOSIS — E78.5 HYPERLIPIDEMIA, UNSPECIFIED: ICD-10-CM

## 2018-09-25 DIAGNOSIS — Z29.9 ENCOUNTER FOR PROPHYLACTIC MEASURES, UNSPECIFIED: ICD-10-CM

## 2018-09-25 LAB
ALBUMIN SERPL ELPH-MCNC: 4.2 G/DL — SIGNIFICANT CHANGE UP (ref 3.3–5)
ALP SERPL-CCNC: 67 U/L — SIGNIFICANT CHANGE UP (ref 40–120)
ALT FLD-CCNC: 16 U/L — SIGNIFICANT CHANGE UP (ref 4–33)
APTT BLD: 163.8 SEC — CRITICAL HIGH (ref 27.5–37.4)
APTT BLD: 40.3 SEC — HIGH (ref 27.5–37.4)
APTT BLD: 66.3 SEC — HIGH (ref 27.5–37.4)
AST SERPL-CCNC: 35 U/L — HIGH (ref 4–32)
BASE EXCESS BLDV CALC-SCNC: -0.4 MMOL/L — SIGNIFICANT CHANGE UP
BASOPHILS # BLD AUTO: 0.03 K/UL — SIGNIFICANT CHANGE UP (ref 0–0.2)
BASOPHILS NFR BLD AUTO: 0.3 % — SIGNIFICANT CHANGE UP (ref 0–2)
BILIRUB SERPL-MCNC: 0.6 MG/DL — SIGNIFICANT CHANGE UP (ref 0.2–1.2)
BLOOD GAS VENOUS - CREATININE: 1.61 MG/DL — HIGH (ref 0.5–1.3)
BUN SERPL-MCNC: 33 MG/DL — HIGH (ref 7–23)
BUN SERPL-MCNC: 34 MG/DL — HIGH (ref 7–23)
CALCIUM SERPL-MCNC: 9 MG/DL — SIGNIFICANT CHANGE UP (ref 8.4–10.5)
CALCIUM SERPL-MCNC: 9.4 MG/DL — SIGNIFICANT CHANGE UP (ref 8.4–10.5)
CHLORIDE BLDV-SCNC: 109 MMOL/L — HIGH (ref 96–108)
CHLORIDE SERPL-SCNC: 105 MMOL/L — SIGNIFICANT CHANGE UP (ref 98–107)
CHLORIDE SERPL-SCNC: 105 MMOL/L — SIGNIFICANT CHANGE UP (ref 98–107)
CHOLEST SERPL-MCNC: 132 MG/DL — SIGNIFICANT CHANGE UP (ref 120–199)
CK MB BLD-MCNC: 4.7 — HIGH (ref 0–2.5)
CK MB BLD-MCNC: 6.21 NG/ML — HIGH (ref 1–4.7)
CK MB BLD-MCNC: 7.15 NG/ML — HIGH (ref 1–4.7)
CK MB BLD-MCNC: 7.97 NG/ML — HIGH (ref 1–4.7)
CK SERPL-CCNC: 119 U/L — SIGNIFICANT CHANGE UP (ref 25–170)
CK SERPL-CCNC: 146 U/L — SIGNIFICANT CHANGE UP (ref 25–170)
CK SERPL-CCNC: 170 U/L — SIGNIFICANT CHANGE UP (ref 25–170)
CO2 SERPL-SCNC: 21 MMOL/L — LOW (ref 22–31)
CO2 SERPL-SCNC: 21 MMOL/L — LOW (ref 22–31)
CREAT SERPL-MCNC: 1.59 MG/DL — HIGH (ref 0.5–1.3)
CREAT SERPL-MCNC: 1.65 MG/DL — HIGH (ref 0.5–1.3)
EOSINOPHIL # BLD AUTO: 0.28 K/UL — SIGNIFICANT CHANGE UP (ref 0–0.5)
EOSINOPHIL NFR BLD AUTO: 2.6 % — SIGNIFICANT CHANGE UP (ref 0–6)
GAS PNL BLDV: 143 MMOL/L — SIGNIFICANT CHANGE UP (ref 136–146)
GLUCOSE BLDC GLUCOMTR-MCNC: 119 MG/DL — HIGH (ref 70–99)
GLUCOSE BLDC GLUCOMTR-MCNC: 142 MG/DL — HIGH (ref 70–99)
GLUCOSE BLDC GLUCOMTR-MCNC: 211 MG/DL — HIGH (ref 70–99)
GLUCOSE BLDC GLUCOMTR-MCNC: 302 MG/DL — HIGH (ref 70–99)
GLUCOSE BLDC GLUCOMTR-MCNC: 66 MG/DL — LOW (ref 70–99)
GLUCOSE BLDC GLUCOMTR-MCNC: 73 MG/DL — SIGNIFICANT CHANGE UP (ref 70–99)
GLUCOSE BLDV-MCNC: 63 — LOW (ref 70–99)
GLUCOSE SERPL-MCNC: 56 MG/DL — LOW (ref 70–99)
GLUCOSE SERPL-MCNC: 63 MG/DL — LOW (ref 70–99)
HBA1C BLD-MCNC: 7.8 % — HIGH (ref 4–5.6)
HCO3 BLDV-SCNC: 24 MMOL/L — SIGNIFICANT CHANGE UP (ref 20–27)
HCT VFR BLD CALC: 32.6 % — LOW (ref 34.5–45)
HCT VFR BLD CALC: 36 % — SIGNIFICANT CHANGE UP (ref 34.5–45)
HCT VFR BLDV CALC: 36.5 % — SIGNIFICANT CHANGE UP (ref 34.5–45)
HDLC SERPL-MCNC: 31 MG/DL — LOW (ref 45–65)
HGB BLD-MCNC: 10.3 G/DL — LOW (ref 11.5–15.5)
HGB BLD-MCNC: 11.6 G/DL — SIGNIFICANT CHANGE UP (ref 11.5–15.5)
HGB BLDV-MCNC: 11.9 G/DL — SIGNIFICANT CHANGE UP (ref 11.5–15.5)
IMM GRANULOCYTES # BLD AUTO: 0.06 # — SIGNIFICANT CHANGE UP
IMM GRANULOCYTES NFR BLD AUTO: 0.5 % — SIGNIFICANT CHANGE UP (ref 0–1.5)
INR BLD: 1.08 — SIGNIFICANT CHANGE UP (ref 0.88–1.17)
LACTATE BLDV-MCNC: 1.6 MMOL/L — SIGNIFICANT CHANGE UP (ref 0.5–2)
LIPID PNL WITH DIRECT LDL SERPL: 78 MG/DL — SIGNIFICANT CHANGE UP
LYMPHOCYTES # BLD AUTO: 1.77 K/UL — SIGNIFICANT CHANGE UP (ref 1–3.3)
LYMPHOCYTES # BLD AUTO: 16.2 % — SIGNIFICANT CHANGE UP (ref 13–44)
MAGNESIUM SERPL-MCNC: 2.2 MG/DL — SIGNIFICANT CHANGE UP (ref 1.6–2.6)
MCHC RBC-ENTMCNC: 29.2 PG — SIGNIFICANT CHANGE UP (ref 27–34)
MCHC RBC-ENTMCNC: 29.8 PG — SIGNIFICANT CHANGE UP (ref 27–34)
MCHC RBC-ENTMCNC: 31.6 % — LOW (ref 32–36)
MCHC RBC-ENTMCNC: 32.2 % — SIGNIFICANT CHANGE UP (ref 32–36)
MCV RBC AUTO: 92.4 FL — SIGNIFICANT CHANGE UP (ref 80–100)
MCV RBC AUTO: 92.5 FL — SIGNIFICANT CHANGE UP (ref 80–100)
MONOCYTES # BLD AUTO: 0.77 K/UL — SIGNIFICANT CHANGE UP (ref 0–0.9)
MONOCYTES NFR BLD AUTO: 7 % — SIGNIFICANT CHANGE UP (ref 2–14)
NEUTROPHILS # BLD AUTO: 8.02 K/UL — HIGH (ref 1.8–7.4)
NEUTROPHILS NFR BLD AUTO: 73.4 % — SIGNIFICANT CHANGE UP (ref 43–77)
NRBC # FLD: 0 — SIGNIFICANT CHANGE UP
NRBC # FLD: 0 — SIGNIFICANT CHANGE UP
NT-PROBNP SERPL-SCNC: 2483 PG/ML — SIGNIFICANT CHANGE UP
PCO2 BLDV: 36 MMHG — LOW (ref 41–51)
PH BLDV: 7.43 PH — SIGNIFICANT CHANGE UP (ref 7.32–7.43)
PHOSPHATE SERPL-MCNC: 3.3 MG/DL — SIGNIFICANT CHANGE UP (ref 2.5–4.5)
PLATELET # BLD AUTO: 268 K/UL — SIGNIFICANT CHANGE UP (ref 150–400)
PLATELET # BLD AUTO: 295 K/UL — SIGNIFICANT CHANGE UP (ref 150–400)
PMV BLD: 8.9 FL — SIGNIFICANT CHANGE UP (ref 7–13)
PMV BLD: 9 FL — SIGNIFICANT CHANGE UP (ref 7–13)
PO2 BLDV: 54 MMHG — HIGH (ref 35–40)
POTASSIUM BLDV-SCNC: 3.5 MMOL/L — SIGNIFICANT CHANGE UP (ref 3.4–4.5)
POTASSIUM SERPL-MCNC: 3.6 MMOL/L — SIGNIFICANT CHANGE UP (ref 3.5–5.3)
POTASSIUM SERPL-MCNC: 3.7 MMOL/L — SIGNIFICANT CHANGE UP (ref 3.5–5.3)
POTASSIUM SERPL-SCNC: 3.6 MMOL/L — SIGNIFICANT CHANGE UP (ref 3.5–5.3)
POTASSIUM SERPL-SCNC: 3.7 MMOL/L — SIGNIFICANT CHANGE UP (ref 3.5–5.3)
PROT SERPL-MCNC: 7.9 G/DL — SIGNIFICANT CHANGE UP (ref 6–8.3)
PROTHROM AB SERPL-ACNC: 12 SEC — SIGNIFICANT CHANGE UP (ref 9.8–13.1)
RBC # BLD: 3.53 M/UL — LOW (ref 3.8–5.2)
RBC # BLD: 3.89 M/UL — SIGNIFICANT CHANGE UP (ref 3.8–5.2)
RBC # FLD: 15.4 % — HIGH (ref 10.3–14.5)
RBC # FLD: 15.6 % — HIGH (ref 10.3–14.5)
SAO2 % BLDV: 83.4 % — SIGNIFICANT CHANGE UP (ref 60–85)
SODIUM SERPL-SCNC: 142 MMOL/L — SIGNIFICANT CHANGE UP (ref 135–145)
SODIUM SERPL-SCNC: 143 MMOL/L — SIGNIFICANT CHANGE UP (ref 135–145)
TRIGL SERPL-MCNC: 194 MG/DL — HIGH (ref 10–149)
TROPONIN T, HIGH SENSITIVITY: 724 NG/L — CRITICAL HIGH (ref ?–14)
TROPONIN T, HIGH SENSITIVITY: 816 NG/L — CRITICAL HIGH (ref ?–14)
TROPONIN T, HIGH SENSITIVITY: 857 NG/L — CRITICAL HIGH (ref ?–14)
TSH SERPL-MCNC: 2.03 UIU/ML — SIGNIFICANT CHANGE UP (ref 0.27–4.2)
WBC # BLD: 10.38 K/UL — SIGNIFICANT CHANGE UP (ref 3.8–10.5)
WBC # BLD: 10.93 K/UL — HIGH (ref 3.8–10.5)
WBC # FLD AUTO: 10.38 K/UL — SIGNIFICANT CHANGE UP (ref 3.8–10.5)
WBC # FLD AUTO: 10.93 K/UL — HIGH (ref 3.8–10.5)

## 2018-09-25 PROCEDURE — 71045 X-RAY EXAM CHEST 1 VIEW: CPT | Mod: 26

## 2018-09-25 RX ORDER — HEPARIN SODIUM 5000 [USP'U]/ML
500 INJECTION INTRAVENOUS; SUBCUTANEOUS
Qty: 25000 | Refills: 0 | Status: DISCONTINUED | OUTPATIENT
Start: 2018-09-25 | End: 2018-09-26

## 2018-09-25 RX ORDER — HEPARIN SODIUM 5000 [USP'U]/ML
3500 INJECTION INTRAVENOUS; SUBCUTANEOUS EVERY 6 HOURS
Qty: 0 | Refills: 0 | Status: DISCONTINUED | OUTPATIENT
Start: 2018-09-25 | End: 2018-09-25

## 2018-09-25 RX ORDER — SODIUM CHLORIDE 9 MG/ML
1000 INJECTION, SOLUTION INTRAVENOUS
Qty: 0 | Refills: 0 | Status: DISCONTINUED | OUTPATIENT
Start: 2018-09-25 | End: 2018-10-03

## 2018-09-25 RX ORDER — LEVOTHYROXINE SODIUM 125 MCG
50 TABLET ORAL DAILY
Qty: 0 | Refills: 0 | Status: DISCONTINUED | OUTPATIENT
Start: 2018-09-25 | End: 2018-10-03

## 2018-09-25 RX ORDER — ATORVASTATIN CALCIUM 80 MG/1
40 TABLET, FILM COATED ORAL AT BEDTIME
Qty: 0 | Refills: 0 | Status: DISCONTINUED | OUTPATIENT
Start: 2018-09-25 | End: 2018-10-03

## 2018-09-25 RX ORDER — METOPROLOL TARTRATE 50 MG
12.5 TABLET ORAL
Qty: 0 | Refills: 0 | Status: DISCONTINUED | OUTPATIENT
Start: 2018-09-25 | End: 2018-10-03

## 2018-09-25 RX ORDER — DEXTROSE 50 % IN WATER 50 %
12.5 SYRINGE (ML) INTRAVENOUS ONCE
Qty: 0 | Refills: 0 | Status: DISCONTINUED | OUTPATIENT
Start: 2018-09-25 | End: 2018-10-03

## 2018-09-25 RX ORDER — DEXTROSE 50 % IN WATER 50 %
25 SYRINGE (ML) INTRAVENOUS ONCE
Qty: 0 | Refills: 0 | Status: DISCONTINUED | OUTPATIENT
Start: 2018-09-25 | End: 2018-10-03

## 2018-09-25 RX ORDER — HEPARIN SODIUM 5000 [USP'U]/ML
INJECTION INTRAVENOUS; SUBCUTANEOUS
Qty: 25000 | Refills: 0 | Status: DISCONTINUED | OUTPATIENT
Start: 2018-09-25 | End: 2018-09-25

## 2018-09-25 RX ORDER — SODIUM BICARBONATE 1 MEQ/ML
650 SYRINGE (ML) INTRAVENOUS THREE TIMES A DAY
Qty: 0 | Refills: 0 | Status: DISCONTINUED | OUTPATIENT
Start: 2018-09-25 | End: 2018-09-28

## 2018-09-25 RX ORDER — HEPARIN SODIUM 5000 [USP'U]/ML
3500 INJECTION INTRAVENOUS; SUBCUTANEOUS EVERY 6 HOURS
Qty: 0 | Refills: 0 | Status: DISCONTINUED | OUTPATIENT
Start: 2018-09-25 | End: 2018-09-26

## 2018-09-25 RX ORDER — FERROUS SULFATE 325(65) MG
325 TABLET ORAL DAILY
Qty: 0 | Refills: 0 | Status: DISCONTINUED | OUTPATIENT
Start: 2018-09-25 | End: 2018-10-03

## 2018-09-25 RX ORDER — INSULIN LISPRO 100/ML
VIAL (ML) SUBCUTANEOUS
Qty: 0 | Refills: 0 | Status: DISCONTINUED | OUTPATIENT
Start: 2018-09-25 | End: 2018-10-03

## 2018-09-25 RX ORDER — INSULIN LISPRO 100/ML
VIAL (ML) SUBCUTANEOUS AT BEDTIME
Qty: 0 | Refills: 0 | Status: DISCONTINUED | OUTPATIENT
Start: 2018-09-25 | End: 2018-10-03

## 2018-09-25 RX ORDER — FUROSEMIDE 40 MG
60 TABLET ORAL
Qty: 0 | Refills: 0 | Status: DISCONTINUED | OUTPATIENT
Start: 2018-09-25 | End: 2018-09-28

## 2018-09-25 RX ORDER — ASPIRIN/CALCIUM CARB/MAGNESIUM 324 MG
81 TABLET ORAL DAILY
Qty: 0 | Refills: 0 | Status: DISCONTINUED | OUTPATIENT
Start: 2018-09-25 | End: 2018-10-03

## 2018-09-25 RX ORDER — MONTELUKAST 4 MG/1
10 TABLET, CHEWABLE ORAL AT BEDTIME
Qty: 0 | Refills: 0 | Status: DISCONTINUED | OUTPATIENT
Start: 2018-09-25 | End: 2018-10-03

## 2018-09-25 RX ORDER — TICAGRELOR 90 MG/1
90 TABLET ORAL
Qty: 0 | Refills: 0 | Status: DISCONTINUED | OUTPATIENT
Start: 2018-09-25 | End: 2018-10-03

## 2018-09-25 RX ORDER — DEXTROSE 50 % IN WATER 50 %
15 SYRINGE (ML) INTRAVENOUS ONCE
Qty: 0 | Refills: 0 | Status: DISCONTINUED | OUTPATIENT
Start: 2018-09-25 | End: 2018-10-03

## 2018-09-25 RX ORDER — FUROSEMIDE 40 MG
40 TABLET ORAL ONCE
Qty: 0 | Refills: 0 | Status: COMPLETED | OUTPATIENT
Start: 2018-09-25 | End: 2018-09-25

## 2018-09-25 RX ORDER — GLUCAGON INJECTION, SOLUTION 0.5 MG/.1ML
1 INJECTION, SOLUTION SUBCUTANEOUS ONCE
Qty: 0 | Refills: 0 | Status: DISCONTINUED | OUTPATIENT
Start: 2018-09-25 | End: 2018-10-03

## 2018-09-25 RX ORDER — HEPARIN SODIUM 5000 [USP'U]/ML
3500 INJECTION INTRAVENOUS; SUBCUTANEOUS ONCE
Qty: 0 | Refills: 0 | Status: COMPLETED | OUTPATIENT
Start: 2018-09-25 | End: 2018-09-25

## 2018-09-25 RX ADMIN — Medication 650 MILLIGRAM(S): at 06:05

## 2018-09-25 RX ADMIN — Medication 12.5 MILLIGRAM(S): at 06:05

## 2018-09-25 RX ADMIN — Medication 325 MILLIGRAM(S): at 11:40

## 2018-09-25 RX ADMIN — Medication 650 MILLIGRAM(S): at 18:34

## 2018-09-25 RX ADMIN — HEPARIN SODIUM 0 UNIT(S)/HR: 5000 INJECTION INTRAVENOUS; SUBCUTANEOUS at 11:23

## 2018-09-25 RX ADMIN — Medication 650 MILLIGRAM(S): at 21:08

## 2018-09-25 RX ADMIN — HEPARIN SODIUM 500 UNIT(S)/HR: 5000 INJECTION INTRAVENOUS; SUBCUTANEOUS at 19:43

## 2018-09-25 RX ADMIN — Medication 40 MILLIGRAM(S): at 01:50

## 2018-09-25 RX ADMIN — MONTELUKAST 10 MILLIGRAM(S): 4 TABLET, CHEWABLE ORAL at 21:08

## 2018-09-25 RX ADMIN — Medication 81 MILLIGRAM(S): at 11:40

## 2018-09-25 RX ADMIN — TICAGRELOR 90 MILLIGRAM(S): 90 TABLET ORAL at 06:05

## 2018-09-25 RX ADMIN — TICAGRELOR 90 MILLIGRAM(S): 90 TABLET ORAL at 18:34

## 2018-09-25 RX ADMIN — Medication 60 MILLIGRAM(S): at 11:40

## 2018-09-25 RX ADMIN — Medication 12.5 MILLIGRAM(S): at 18:34

## 2018-09-25 RX ADMIN — ATORVASTATIN CALCIUM 40 MILLIGRAM(S): 80 TABLET, FILM COATED ORAL at 21:08

## 2018-09-25 RX ADMIN — Medication 1 TABLET(S): at 11:40

## 2018-09-25 RX ADMIN — HEPARIN SODIUM 3500 UNIT(S): 5000 INJECTION INTRAVENOUS; SUBCUTANEOUS at 04:20

## 2018-09-25 RX ADMIN — HEPARIN SODIUM 700 UNIT(S)/HR: 5000 INJECTION INTRAVENOUS; SUBCUTANEOUS at 04:20

## 2018-09-25 RX ADMIN — Medication 50 MICROGRAM(S): at 06:05

## 2018-09-25 RX ADMIN — Medication 2: at 12:44

## 2018-09-25 RX ADMIN — Medication 2: at 21:52

## 2018-09-25 NOTE — H&P ADULT - ATTENDING COMMENTS
Patient seen and examined, agree with above assessment and plan as transcribed above.    - Keep net negative  - No need for further inpatient cardiac work up.    Vargas Adame MD, FACC

## 2018-09-25 NOTE — CONSULT NOTE ADULT - PROBLEM SELECTOR RECOMMENDATION 9
Pt with CKD sec to long standing DM/HTN and CHF.   Baseline SCr 1.5-1.8  relatively stable right now  monitor bmp  avoid nephrotoxic agents Pt with CKD sec to long standing DM/HTN and CHF.   Baseline SCr 1.5-1.8  Renal function relatively stable   monitor bmp  avoid nephrotoxic agents

## 2018-09-25 NOTE — H&P ADULT - PROBLEM SELECTOR PLAN 3
BUN/Cr: 33/1.65 likely cardio-renal mediated   Will monitor for now   Avoid nephrotoxic medications   Renal diet ordered   Will need to call Dr. Muhammad in am for renal consult

## 2018-09-25 NOTE — ED ADULT TRIAGE NOTE - CHIEF COMPLAINT QUOTE
Pt primarily Faroese.  family preferred to translate.  BIBA for SOB.  Pt arrives on non N/C 6LPM.  SPO2 ~95%.  In field 85-90%.  PT tachypneic, with labored breathing.  EKG in progress. Pt primarily Japanese.  family preferred to translate.  BIBA for SOB.  Pt arrives on non N/C 6LPM.  SPO2 ~95%.  In field 85-90%.  PT tachypneic, with labored breathing.  EKG in progress.    addendum.  Pt with abnormal EKG.  MD requesting Room,  CN aware.  Pt to RM#1.

## 2018-09-25 NOTE — ED PROVIDER NOTE - MEDICAL DECISION MAKING DETAILS
72F, extensive pmhx and cardiac hx, presents with shortness of breath and chest heaviness. exam as above. concern for ACS vs CHF exacerbation vs other. Labs, CXR, ekg, lasix, bipap. Will continue to follow up and re-assess. Case discussed with Attending: Prashant Rojas MD, PGY2 Emergency Medicine

## 2018-09-25 NOTE — CONSULT NOTE ADULT - ASSESSMENT
73 y/o F with PMH of CHF(with EF of 37%), CAD(s/p CABG), DM, HLD, Hypothyroidism, GERD, CKD stage 3 presents with 2 days of worsening shortness of breath and ZEE. As per family, pateint was feeling weak and on and off SOB for 1 week

## 2018-09-25 NOTE — H&P ADULT - NEGATIVE OPHTHALMOLOGIC SYMPTOMS
no photophobia/no blurred vision L/no diplopia/no discharge R/no lacrimation L/no lacrimation R/no discharge L/no blurred vision R

## 2018-09-25 NOTE — ED PROVIDER NOTE - SECONDARY DIAGNOSIS.
SOB (shortness of breath) Acute respiratory failure, unspecified whether with hypoxia or hypercapnia

## 2018-09-25 NOTE — H&P ADULT - HISTORY OF PRESENT ILLNESS
73 y/o F with PMH of CHF(with EF of 37%), CAD(s/p CABG), DM, HLD, Hypothyroidism GERD presents with 2 days of worsening shortness of breath and ZEE. 71 y/o F with PMH of CHF(with EF of 37%), CAD(s/p CABG), DM, HLD, Hypothyroidism GERD presents with 2 days of worsening shortness of breath and ZEE. As per patient she has been having worsening SOB and ZEE for the past two days. Patient stated that she is compliant with her heart medications and does not eat food high in salt or drink water. Patient stated that she developed midsternal chest pain few days ago, and characterized as a sharp pain, without any aggravating or alleviating factors, without any radiation, with a severity of 5/10. Patient also endorsed of orthopnea, ZEE and intermittent bilateral LE swelling. Patient denied any fevers, chills, N/V/D/C, abdominal pain, dysuria, melena, hematochezia, recent travel, sick contact, pleuritic or positional chest pain.     On ED admission EKG revealed NSR at a rate of 83 with TWI in leads I, AVL and QTc of 493, CE x 1: Trop: 724, CK: Negative, CKMB: 7.96, CE x 2: Trop: 816, CK: Negative, H&H: 10.3/32.6, BUN/Cr: 33/1.65. Prelim CXR: Mild pulmonary edema. In the ED patient was placed on BIPAP and was given IV Lasix 40mg x 1. Patient was also started on heparin drip for elevated troponin.

## 2018-09-25 NOTE — ED ADULT NURSE NOTE - OBJECTIVE STATEMENT
pt received to room 20. Arabic speaking, requests for daughter at bedside to translate. complains of increasing sob for past 2 days. pt also c/o chest heaviness and ZEE, but states this is chronic. pt tachypneic on arrival to room. respiratory called to bedside, pt placed on bipap. pt denies any fever/chills, dizziness, weakness, n/v/d. labs sent. 20 g iv placed in right ac. medicated as ordered. awaits results with family at bedside.

## 2018-09-25 NOTE — CONSULT NOTE ADULT - SUBJECTIVE AND OBJECTIVE BOX
[facility] [place]   MD RAHEEL SHAH MD ANGELA WONG, PA    TEL:  OFFICE: [phone]  DR SANTOYO CELL: [phone]  DR. NGUYEN CELL: [phone]  UMM KENDALL CELL: [phone]      HPI:  History from family member at bedside  73 y/o F with PMH of CHF(with EF of 37%), CAD(s/p CABG), DM, HLD, Hypothyroidism, GERD, CKD stage 3 presents with 2 days of worsening shortness of breath and ZEE. As per family, pateint was feeling weak and on and off SOB for 1 week, sunday patient having  SOB, accompany by chest pressure.  Patient also endorsed of orthopnea, ZEE and intermittent bilateral LE swelling. Patient denied any fevers, chills, N/V/D/C, abdominal pain, dysuria, melena, hematochezia, recent travel, sick contact, pleuritic or positional chest pain.     Allergies:  No Known Allergies      PAST MEDICAL & SURGICAL HISTORY:  GERD (gastroesophageal reflux disease)  CAD (coronary artery disease): s/p CABG  Hypothyroidism  Hyperlipidemia  Diabetes mellitus, type 2  S/P CABG (coronary artery bypass graft)      Home Medications Reviewed    Hospital Medications:   MEDICATIONS  (STANDING):  aspirin enteric coated 81 milliGRAM(s) Oral daily  atorvastatin 40 milliGRAM(s) Oral at bedtime  dextrose 5%. 1000 milliLiter(s) (50 mL/Hr) IV Continuous <Continuous>  dextrose 50% Injectable 12.5 Gram(s) IV Push once  dextrose 50% Injectable 25 Gram(s) IV Push once  dextrose 50% Injectable 25 Gram(s) IV Push once  ferrous    sulfate 325 milliGRAM(s) Oral daily  furosemide   Injectable 60 milliGRAM(s) IV Push two times a day  heparin  Infusion.  Unit(s)/Hr (7 mL/Hr) IV Continuous <Continuous>  insulin lispro (HumaLOG) corrective regimen sliding scale   SubCutaneous three times a day before meals  insulin lispro (HumaLOG) corrective regimen sliding scale   SubCutaneous at bedtime  levothyroxine 50 MICROGram(s) Oral daily  metoprolol tartrate 12.5 milliGRAM(s) Oral two times a day  montelukast 10 milliGRAM(s) Oral at bedtime  multivitamin 1 Tablet(s) Oral daily  sodium bicarbonate 650 milliGRAM(s) Oral three times a day  ticagrelor 90 milliGRAM(s) Oral two times a day      SOCIAL HISTORY:  Denies ETOh, Smoking,     FAMILY HISTORY:  Family history of hypertension (Sibling): sister  Family history of diabetes mellitus (Sibling): brothers/sisters      REVIEW OF SYSTEMS:  CONSTITUTIONAL: + weakness, no fevers or chills  EYES/ENT: No visual changes;  No vertigo or throat pain   NECK: No pain or stiffness  RESPIRATORY: No cough, wheezing, hemoptysis; + shortness of breath  CARDIOVASCULAR: + chest pain   GASTROINTESTINAL: No abdominal or epigastric pain. No nausea, vomiting, or hematemesis; No diarrhea or constipation. No melena or hematochezia.  GENITOURINARY: No dysuria, frequency, foamy urine, urinary urgency, incontinence or hematuria  NEUROLOGICAL: No numbness or weakness  SKIN: No itching, burning, rashes, or lesions   VASCULAR: No bilateral lower extremity edema.   All other review of systems is negative unless indicated above.    VITALS:  T(F): 97.8 (09-25-18 @ 10:18), Max: 98.5 (09-25-18 @ 01:45)  HR: 76 (09-25-18 @ 11:27)  BP: 120/61 (09-25-18 @ 11:27)  RR: 26 (09-25-18 @ 11:27)  SpO2: 100% (09-25-18 @ 11:27)  Wt(kg): --    09-24 @ 07:01  -  09-25 @ 07:00  --------------------------------------------------------  IN: 0 mL / OUT: 425 mL / NET: -425 mL    09-25 @ 07:01  -  09-25 @ 12:29  --------------------------------------------------------  IN: 0 mL / OUT: 200 mL / NET: -200 mL        Weight (kg): 56.8 (09-25 @ 01:04)    PHYSICAL EXAM:  Constitutional: NAD  HEENT: anicteric sclera, oropharynx clear, MMM  Neck: No JVD  Respiratory: poor air entry b/l lung field   Cardiovascular: S1, S2, RRR  Gastrointestinal: BS+, soft, NT/ND  Extremities: No cyanosis or clubbing. No peripheral edema  Neurological: A/O x 3, no focal deficits  Psychiatric: Normal mood, normal affect  : No CVA tenderness. No cuellar.   Skin: No rashes      LABS:  09-25    143  |  105  |  33<H>  ----------------------------<  56<L>  3.7   |  21<L>  |  1.65<H>    Ca    9.0      25 Sep 2018 04:20  Phos  3.3     09-25  Mg     2.2     09-25    TPro  7.9  /  Alb  4.2  /  TBili  0.6  /  DBili      /  AST  35<H>  /  ALT  16  /  AlkPhos  67  09-25    Creatinine Trend: 1.65 <--, 1.59 <--                        10.3   10.38 )-----------( 268      ( 25 Sep 2018 04:20 )             32.6     Urine Studies:        RADIOLOGY & ADDITIONAL STUDIES:

## 2018-09-25 NOTE — ED PROVIDER NOTE - PROGRESS NOTE DETAILS
Trop >700. EKG unchanged from priors. CXR with minimal effusions. Concern for NSTEMI. Will attempt to reach Dr Quick, cardiologist  Patient on BiPap, feels improved, no active chest pain or difficulty breathing   Kj Rojas MD, PGY2 Emergency Medicine Attempted to reach Dr Quick via cell, no answer, unable to leave VM  Will try alternate numbers  Kj Rojas MD, PGY2 Emergency Medicine Attempted to reach Dr Quick via cell, no answer, unable to leave VM  Will try alternate numbers. Paged answering service.   Kj Rojas MD, PGY2 Emergency Medicine Spoke w Dr Quick who requested admit to [facility] on Tele  Kj Rojas MD, PGY2 Emergency Medicine

## 2018-09-25 NOTE — ED PROVIDER NOTE - CRITICAL CARE PROVIDED
direct patient care (not related to procedure)/additional history taking/interpretation of diagnostic studies/consultation with other physicians/documentation/consult w/ pt's family directly relating to pts condition

## 2018-09-25 NOTE — H&P ADULT - PROBLEM SELECTOR PLAN 4
Patient sugars noted to be 66->56 and patient is on Troujeo 66U QHS.  Will place patient on low dose sliding scale(humalog)  FS TID at bedtime, HgbA1c in am   Will likely need endocrine consult in am for labile blood sugars

## 2018-09-25 NOTE — H&P ADULT - PROBLEM SELECTOR PLAN 1
Admit to telemetry, serial Valentin, serial EKGs. BNP noted to be 2483 almost doubled from prior admission   HgbA1C, TSH, lipid profile, CBC, CMP in am   Low sodium diet, daily weights, monitor I's and O's, fluid restrictions 1000cc/day  Check BNP in 48 hours   Started on Lasix IV 60mg BID

## 2018-09-25 NOTE — H&P ADULT - NEGATIVE GASTROINTESTINAL SYMPTOMS
no nausea/no constipation/no abdominal pain/no hematochezia/no diarrhea/no change in bowel habits/no melena/no vomiting

## 2018-09-25 NOTE — ED PROVIDER NOTE - OBJECTIVE STATEMENT
Patient is primarily Italian speaking, understands some english. Patient declined  services and requested family translate at bedside.  72F, hx of CHF, CAD, DM, HLD, HYPOthyroid presents with 2 days worsening shortness of breath. Patient states she has had worsening shortness of breath and difficulty breathing over last 2 days. Endorses chronic chest heaviness, orthopnea, dyspnea on exertion. Denies any associated fevers or chills, nausea or vomiting, headache, lightheadedness, blurry vision, abdominal pain, urinary sx, cough. Meds as below. Recent hospitalization for CHF exacerbation.  PCP dr Neelam Calderon  Cards Dr Quick Patient is primarily Faroese speaking, understands some english. Patient declined  services and requested family translate at bedside.  72F, hx of CHF, CAD, CABG, DM, HLD, HYPOthyroid presents with 2 days worsening shortness of breath. Patient states she has had worsening shortness of breath and difficulty breathing over last 2 days. Endorses chronic chest heaviness, orthopnea, dyspnea on exertion. Denies any associated fevers or chills, nausea or vomiting, headache, lightheadedness, blurry vision, abdominal pain, urinary sx, cough. Meds as below. Recent hospitalization for CHF exacerbation.  PCP dr Neelam Calderon  Cards Dr Quick

## 2018-09-25 NOTE — H&P ADULT - NEGATIVE NEUROLOGICAL SYMPTOMS
no syncope/no generalized seizures/no paresthesias/no confusion/no tremors/no vertigo/no loss of sensation/no weakness/no focal seizures/no difficulty walking/no loss of consciousness/no hemiparesis/no headache/no transient paralysis

## 2018-09-25 NOTE — H&P ADULT - PROBLEM SELECTOR PLAN 2
HsT on admission noted to be 724, CK: Negative, CKMB: 7.96 and repeat CE x 2: Trop: 816, CK: Negative  Patient was started on heparin drip for anticoagulation for NSTEMI   Will monitor on telemetry, serial EKG and Valentin prn for any more episodes of chest pain  Consider ischemic workup with NST vs LHC this admission(likely will need clearance with Renal if LHC is to be done this admission)  Continue with Aspirin 81mg and Brilinta 90mg BID

## 2018-09-25 NOTE — CONSULT NOTE ADULT - PROBLEM SELECTOR RECOMMENDATION 2
on lasix 60mg bid iv  monitor daily weight, strict I/O  f/u cardio in setting of HF (EF 37%) in 9/2018   currently on lasix 60mg bid iv  monitor daily weight, strict I/O  f/u cardio

## 2018-09-25 NOTE — ED PROVIDER NOTE - ATTENDING CONTRIBUTION TO CARE
pt with history of CHF and CABG presenting with SOB.  Similar to previous presentation with ADHF.  Some chest pressure as well.      Gen: Well appearing in moderate resp distress  Head: NC/AT  Neck: trachea midline  Resp:  moderate distress - B lower crackles  CV: RRR  Ext: no deformities no edema  Neuro:  A&O appears non focal  Skin:  Warm and dry as visualized    Pt with ARF - will place on BiPap for work of breathing.  EKG unchanged from previous but trop significantly elevated in setting of not significantly elevated - need to consider a cardiac etiology.  Will start Heparin.  Now pain free so no indication for emergent cath.  Will admit to tele

## 2018-09-25 NOTE — CONSULT NOTE ADULT - ASSESSMENT
Assessment  DMT2: 72y Female with DM T2 with hyperglycemia on insulin, blood sugars trending down, had hypoglycemia in am,  eating meals,  non compliant with low carb diet.  CAD: On medications, stable, monitored.  Hypothyroidism:  On synthroid 50 mcg po daily, asymptomatic.  HTN: Controlled, On med.  CKD: Monitor labs/BMP,

## 2018-09-25 NOTE — CONSULT NOTE ADULT - PROBLEM SELECTOR RECOMMENDATION 3
On medications, monitored and followed up by primary/cardiology team
in setting of CKD  Monitor serum CO2

## 2018-09-25 NOTE — ED ADULT NURSE NOTE - NSIMPLEMENTINTERV_GEN_ALL_ED
Implemented All Universal Safety Interventions:  [place] to call system. Call bell, personal items and telephone within reach. Instruct patient to call for assistance. Room bathroom lighting operational. Non-slip footwear when patient is off stretcher. Physically safe environment: no spills, clutter or unnecessary equipment. Stretcher in lowest position, wheels locked, appropriate side rails in place.

## 2018-09-25 NOTE — ED ADULT NURSE NOTE - CHIEF COMPLAINT QUOTE
Pt primarily Greenlandic.  family preferred to translate.  BIBA for SOB.  Pt arrives on non N/C 6LPM.  SPO2 ~95%.  In field 85-90%.  PT tachypneic, with labored breathing.  EKG in progress.    addendum.  Pt with abnormal EKG.  MD requesting Room,  CN aware.  Pt to RM#1.

## 2018-09-25 NOTE — ED ADULT NURSE REASSESSMENT NOTE - NS ED NURSE REASSESS COMMENT FT1
Pt. taken off Bipap to eat breakfast and placed on 4L NC as per PA Keith. Will monitor to see if pt. tolerates well. VS done as charted.

## 2018-09-25 NOTE — H&P ADULT - NEGATIVE ENMT SYMPTOMS
no vertigo/no tinnitus/no hearing difficulty/no nasal congestion/no sinus symptoms/no nasal discharge/no ear pain

## 2018-09-25 NOTE — H&P ADULT - NSHPLABSRESULTS_GEN_ALL_CORE
10.3   10.38 )-----------( 268      ( 25 Sep 2018 04:20 )             32.6     09-25    143  |  105  |  33<H>  ----------------------------<  56<L>  3.7   |  21<L>  |  1.65<H>    Ca    9.0      25 Sep 2018 04:20  Phos  3.3     09-25  Mg     2.2     09-25    TPro  7.9  /  Alb  4.2  /  TBili  0.6  /  DBili  x   /  AST  35<H>  /  ALT  16  /  AlkPhos  67  09-25    Prelim CXR: Mild pulmonary edema    EKG: NSR at a rate of 83 with TWI in leads I, AVL and QTc of 493

## 2018-09-25 NOTE — H&P ADULT - NEGATIVE MUSCULOSKELETAL SYMPTOMS
no joint swelling/no muscle weakness/no arthralgia/no muscle cramps/no neck pain/no arthritis/no stiffness/no myalgia

## 2018-09-25 NOTE — CONSULT NOTE ADULT - SUBJECTIVE AND OBJECTIVE BOX
HPI:  71 y/o F with PMH of CHF(with EF of 37%), CAD(s/p CABG), DM, HLD, Hypothyroidism GERD presents with 2 days of worsening shortness of breath and ZEE. As per patient she has been having worsening SOB and ZEE for the past two days. Patient stated that she is compliant with her heart medications and does not eat food high in salt or drink water. Patient stated that she developed midsternal chest pain few days ago, and characterized as a sharp pain, without any aggravating or alleviating factors, without any radiation, with a severity of 5/10. Patient also endorsed of orthopnea, ZEE and intermittent bilateral LE swelling. Patient denied any fevers, chills, N/V/D/C, abdominal pain, dysuria, melena, hematochezia, recent travel, sick contact, pleuritic or positional chest pain.     On ED admission EKG revealed NSR at a rate of 83 with TWI in leads I, AVL and QTc of 493, CE x 1: Trop: 724, CK: Negative, CKMB: 7.96, CE x 2: Trop: 816, CK: Negative, H&H: 10.3/32.6, BUN/Cr: 33/1.65. Prelim CXR: Mild pulmonary edema. In the ED patient was placed on BIPAP and was given IV Lasix 40mg x 1. Patient was also started on heparin drip for elevated troponin. (25 Sep 2018 05:30)  Patient has history of diabetes, on insulin at home, no recent hypoglycemic episodes, no polyuria polydipsia. Patient follows up with PCP for diabetes management.    PAST MEDICAL & SURGICAL HISTORY:  GERD (gastroesophageal reflux disease)  CAD (coronary artery disease): s/p CABG  Hypothyroidism  Hyperlipidemia  Diabetes mellitus, type 2  S/P CABG (coronary artery bypass graft)      FAMILY HISTORY:  Family history of hypertension (Sibling): sister  Family history of diabetes mellitus (Sibling): brothers/sisters      Social History:    Outpatient Medications:    MEDICATIONS  (STANDING):  aspirin enteric coated 81 milliGRAM(s) Oral daily  atorvastatin 40 milliGRAM(s) Oral at bedtime  dextrose 5%. 1000 milliLiter(s) (50 mL/Hr) IV Continuous <Continuous>  dextrose 50% Injectable 12.5 Gram(s) IV Push once  dextrose 50% Injectable 25 Gram(s) IV Push once  dextrose 50% Injectable 25 Gram(s) IV Push once  ferrous    sulfate 325 milliGRAM(s) Oral daily  furosemide   Injectable 60 milliGRAM(s) IV Push two times a day  heparin  Infusion.  Unit(s)/Hr (7 mL/Hr) IV Continuous <Continuous>  insulin lispro (HumaLOG) corrective regimen sliding scale   SubCutaneous three times a day before meals  insulin lispro (HumaLOG) corrective regimen sliding scale   SubCutaneous at bedtime  levothyroxine 50 MICROGram(s) Oral daily  metoprolol tartrate 12.5 milliGRAM(s) Oral two times a day  montelukast 10 milliGRAM(s) Oral at bedtime  multivitamin 1 Tablet(s) Oral daily  sodium bicarbonate 650 milliGRAM(s) Oral three times a day  ticagrelor 90 milliGRAM(s) Oral two times a day    MEDICATIONS  (PRN):  dextrose 40% Gel 15 Gram(s) Oral once PRN Blood Glucose LESS THAN 70 milliGRAM(s)/deciliter  glucagon  Injectable 1 milliGRAM(s) IntraMuscular once PRN Glucose LESS THAN 70 milligrams/deciliter  heparin  Injectable 3500 Unit(s) IV Push every 6 hours PRN For aPTT less than 40      Allergies    No Known Allergies    Intolerances      Review of Systems:  Constitutional: No fever, no chills  Eyes: No blurry vision  Neuro: No tremors  HEENT: No pain, no neck swelling  Cardiovascular: No chest pain, no palpitations  Respiratory: Has SOB, no cough  GI: No nausea, vomiting, abdominal pain  : No dysuria  Skin: no rash  MSK: Has leg swelling.  Psych: no depression  Endocrine: no polyuria, polydipsia    ALL OTHER SYSTEMS REVIEWED AND NEGATIVE    UNABLE TO OBTAIN    PHYSICAL EXAM:  VITALS: T(C): 37 (09-25-18 @ 15:31)  T(F): 98.6 (09-25-18 @ 15:31), Max: 98.6 (09-25-18 @ 15:31)  HR: 76 (09-25-18 @ 15:31) (66 - 78)  BP: 116/68 (09-25-18 @ 15:31) (100/70 - 156/63)  RR:  (19 - 40)  SpO2:  (99% - 100%)  Wt(kg): --  GENERAL: NAD, well-groomed, well-developed  EYES: No proptosis, no lid lag  HEENT:  Atraumatic, Normocephalic  THYROID: Normal size, no palpable nodules  RESPIRATORY: Clear to auscultation bilaterally; No rales, rhonchi, wheezing  CARDIOVASCULAR: Si S2, No murmurs;  GI: Soft, non distended, normal bowel sounds  SKIN: Dry, intact, No rashes or lesions  MUSCULOSKELETAL: Has BL lower extremity edema.  NEURO:  no tremor, sensation decreased in feet BL,    POCT Blood Glucose.: 119 mg/dL (09-25-18 @ 17:39)  POCT Blood Glucose.: 211 mg/dL (09-25-18 @ 12:33)  POCT Blood Glucose.: 137 mg/dL (09-25-18 @ 09:22)  POCT Blood Glucose.: 142 mg/dL (09-25-18 @ 06:53)  POCT Blood Glucose.: 73 mg/dL (09-25-18 @ 05:39)  POCT Blood Glucose.: 66 mg/dL (09-25-18 @ 05:14)                            10.3   10.38 )-----------( 268      ( 25 Sep 2018 04:20 )             32.6       09-25    143  |  105  |  33<H>  ----------------------------<  56<L>  3.7   |  21<L>  |  1.65<H>    EGFR if : 36  EGFR if non : 31    Ca    9.0      09-25  Mg     2.2     09-25  Phos  3.3     09-25    TPro  7.9  /  Alb  4.2  /  TBili  0.6  /  DBili  x   /  AST  35<H>  /  ALT  16  /  AlkPhos  67  09-25      Thyroid Function Tests:  09-25 @ 04:20 TSH 2.03 FreeT4 -- T3 -- Anti TPO -- Anti Thyroglobulin Ab -- TSI --      Hemoglobin A1C, Whole Blood: 7.8 % <H> [4.0 - 5.6] (09-25-18 @ 04:20)  Hemoglobin A1C, Whole Blood: 7.4 % <H> [4.0 - 5.6] (09-02-18 @ 02:30)      09-25 Chol 132 LDL 78 HDL 31<L> Trig 194<H>    Radiology:

## 2018-09-25 NOTE — ED ADULT NURSE REASSESSMENT NOTE - NS ED NURSE REASSESS COMMENT FT1
Received rpt from RJ Pradhan. Pt. A&Ox3, continued on Bipap for SOB. Daughter-in-law at bedside states pt. has been having ZEE with cp x 2 days. Pt. admitted for NSTEMI. Denies cp, dizziness, palpitations or n/v at this time. Pt. tolerating Bipap well- sating 98%. Awaiting bed assignment. Will continue to monitor.

## 2018-09-25 NOTE — ED PROVIDER NOTE - CARE PLAN
Principal Discharge DX:	NSTEMI (non-ST elevated myocardial infarction)  Secondary Diagnosis:	SOB (shortness of breath) Principal Discharge DX:	NSTEMI (non-ST elevated myocardial infarction)  Secondary Diagnosis:	SOB (shortness of breath)  Secondary Diagnosis:	Acute respiratory failure, unspecified whether with hypoxia or hypercapnia

## 2018-09-25 NOTE — H&P ADULT - ASSESSMENT
71 y/o F with PMH of CHF(with EF of 37%), CAD(s/p CABG), DM, HLD, Hypothyroidism GERD presents with 2 days of worsening shortness of breath and ZEE.     +Acute on chronic sytolic CHF-On IV Lasix 60mg BID and on BIPAP  +NSTEMI-On heparin gtt

## 2018-09-26 LAB
ALBUMIN SERPL ELPH-MCNC: 3.9 G/DL — SIGNIFICANT CHANGE UP (ref 3.3–5)
ALP SERPL-CCNC: 69 U/L — SIGNIFICANT CHANGE UP (ref 40–120)
ALT FLD-CCNC: 15 U/L — SIGNIFICANT CHANGE UP (ref 4–33)
APTT BLD: 59.2 SEC — HIGH (ref 27.5–37.4)
APTT BLD: 67.5 SEC — HIGH (ref 27.5–37.4)
AST SERPL-CCNC: 26 U/L — SIGNIFICANT CHANGE UP (ref 4–32)
BILIRUB SERPL-MCNC: 0.6 MG/DL — SIGNIFICANT CHANGE UP (ref 0.2–1.2)
BUN SERPL-MCNC: 37 MG/DL — HIGH (ref 7–23)
CALCIUM SERPL-MCNC: 9.4 MG/DL — SIGNIFICANT CHANGE UP (ref 8.4–10.5)
CHLORIDE SERPL-SCNC: 102 MMOL/L — SIGNIFICANT CHANGE UP (ref 98–107)
CO2 SERPL-SCNC: 23 MMOL/L — SIGNIFICANT CHANGE UP (ref 22–31)
CREAT SERPL-MCNC: 1.76 MG/DL — HIGH (ref 0.5–1.3)
GLUCOSE BLDC GLUCOMTR-MCNC: 186 MG/DL — HIGH (ref 70–99)
GLUCOSE BLDC GLUCOMTR-MCNC: 244 MG/DL — HIGH (ref 70–99)
GLUCOSE BLDC GLUCOMTR-MCNC: 270 MG/DL — HIGH (ref 70–99)
GLUCOSE BLDC GLUCOMTR-MCNC: 332 MG/DL — HIGH (ref 70–99)
GLUCOSE SERPL-MCNC: 188 MG/DL — HIGH (ref 70–99)
HCT VFR BLD CALC: 34.3 % — LOW (ref 34.5–45)
HCT VFR BLD CALC: 34.6 % — SIGNIFICANT CHANGE UP (ref 34.5–45)
HGB BLD-MCNC: 11 G/DL — LOW (ref 11.5–15.5)
HGB BLD-MCNC: 11.2 G/DL — LOW (ref 11.5–15.5)
MAGNESIUM SERPL-MCNC: 2.3 MG/DL — SIGNIFICANT CHANGE UP (ref 1.6–2.6)
MCHC RBC-ENTMCNC: 29.3 PG — SIGNIFICANT CHANGE UP (ref 27–34)
MCHC RBC-ENTMCNC: 30.3 PG — SIGNIFICANT CHANGE UP (ref 27–34)
MCHC RBC-ENTMCNC: 31.8 % — LOW (ref 32–36)
MCHC RBC-ENTMCNC: 32.7 % — SIGNIFICANT CHANGE UP (ref 32–36)
MCV RBC AUTO: 92.3 FL — SIGNIFICANT CHANGE UP (ref 80–100)
MCV RBC AUTO: 92.7 FL — SIGNIFICANT CHANGE UP (ref 80–100)
NRBC # FLD: 0 — SIGNIFICANT CHANGE UP
NRBC # FLD: 0 — SIGNIFICANT CHANGE UP
PHOSPHATE SERPL-MCNC: 3.2 MG/DL — SIGNIFICANT CHANGE UP (ref 2.5–4.5)
PLATELET # BLD AUTO: 267 K/UL — SIGNIFICANT CHANGE UP (ref 150–400)
PLATELET # BLD AUTO: 275 K/UL — SIGNIFICANT CHANGE UP (ref 150–400)
PMV BLD: 9 FL — SIGNIFICANT CHANGE UP (ref 7–13)
PMV BLD: 9.1 FL — SIGNIFICANT CHANGE UP (ref 7–13)
POTASSIUM SERPL-MCNC: 3.9 MMOL/L — SIGNIFICANT CHANGE UP (ref 3.5–5.3)
POTASSIUM SERPL-SCNC: 3.9 MMOL/L — SIGNIFICANT CHANGE UP (ref 3.5–5.3)
PROT SERPL-MCNC: 7.6 G/DL — SIGNIFICANT CHANGE UP (ref 6–8.3)
RBC # BLD: 3.7 M/UL — LOW (ref 3.8–5.2)
RBC # BLD: 3.75 M/UL — LOW (ref 3.8–5.2)
RBC # FLD: 15.4 % — HIGH (ref 10.3–14.5)
RBC # FLD: 15.5 % — HIGH (ref 10.3–14.5)
SODIUM SERPL-SCNC: 140 MMOL/L — SIGNIFICANT CHANGE UP (ref 135–145)
WBC # BLD: 10.95 K/UL — HIGH (ref 3.8–10.5)
WBC # BLD: 9.6 K/UL — SIGNIFICANT CHANGE UP (ref 3.8–10.5)
WBC # FLD AUTO: 10.95 K/UL — HIGH (ref 3.8–10.5)
WBC # FLD AUTO: 9.6 K/UL — SIGNIFICANT CHANGE UP (ref 3.8–10.5)

## 2018-09-26 PROCEDURE — 73620 X-RAY EXAM OF FOOT: CPT | Mod: 26,LT

## 2018-09-26 PROCEDURE — 93971 EXTREMITY STUDY: CPT | Mod: 26,LT

## 2018-09-26 RX ADMIN — Medication 81 MILLIGRAM(S): at 13:13

## 2018-09-26 RX ADMIN — Medication 650 MILLIGRAM(S): at 05:08

## 2018-09-26 RX ADMIN — Medication 3: at 13:14

## 2018-09-26 RX ADMIN — Medication 60 MILLIGRAM(S): at 17:43

## 2018-09-26 RX ADMIN — ATORVASTATIN CALCIUM 40 MILLIGRAM(S): 80 TABLET, FILM COATED ORAL at 21:50

## 2018-09-26 RX ADMIN — Medication 1: at 09:12

## 2018-09-26 RX ADMIN — MONTELUKAST 10 MILLIGRAM(S): 4 TABLET, CHEWABLE ORAL at 21:50

## 2018-09-26 RX ADMIN — Medication 650 MILLIGRAM(S): at 21:50

## 2018-09-26 RX ADMIN — Medication 325 MILLIGRAM(S): at 13:14

## 2018-09-26 RX ADMIN — Medication 12.5 MILLIGRAM(S): at 17:43

## 2018-09-26 RX ADMIN — HEPARIN SODIUM 500 UNIT(S)/HR: 5000 INJECTION INTRAVENOUS; SUBCUTANEOUS at 02:16

## 2018-09-26 RX ADMIN — Medication 650 MILLIGRAM(S): at 13:13

## 2018-09-26 RX ADMIN — TICAGRELOR 90 MILLIGRAM(S): 90 TABLET ORAL at 05:08

## 2018-09-26 RX ADMIN — Medication 50 MICROGRAM(S): at 05:08

## 2018-09-26 RX ADMIN — Medication 2: at 21:50

## 2018-09-26 RX ADMIN — Medication 1 TABLET(S): at 13:14

## 2018-09-26 RX ADMIN — TICAGRELOR 90 MILLIGRAM(S): 90 TABLET ORAL at 17:43

## 2018-09-26 RX ADMIN — Medication 2: at 17:43

## 2018-09-26 RX ADMIN — HEPARIN SODIUM 500 UNIT(S)/HR: 5000 INJECTION INTRAVENOUS; SUBCUTANEOUS at 08:34

## 2018-09-26 NOTE — PROGRESS NOTE ADULT - ATTENDING COMMENTS
Patient seen and examined, agree with above assessment and plan as transcribed above.    - Keep negative  - No signs of ACS    Vargas Adame MD, FACC

## 2018-09-26 NOTE — PROGRESS NOTE ADULT - PROBLEM SELECTOR PLAN 1
Pt with CKD sec to long standing DM/HTN and CHF.   Baseline SCr 1.5-1.8  Renal function relatively stable   Monitor bmp  Avoid nephrotoxic agents, NSAIDS, RCA

## 2018-09-26 NOTE — DIETITIAN INITIAL EVALUATION ADULT. - OTHER INFO
Nutrition Consult X Registered Dietitian. Pt 73 yo female appears alert, oriented. Pt speaks Hebrew; this RDN speaks Hebrew as well. Per Pt her appetite/PO intake not that well; Pt eats snacks through out the day reported. Pt partially edentulous, RDN offered Mechanical Soft consistency food option, but Pt declined. Pt denied having any swallowing difficulty. No report of nausea/vomiting/diarrhea @ present. But Pt C/O occasional constipation. Per Pt her UBW range: ~125-127#; her height: ~4'11". No weight loss or weight changes voiced. At home Pt eats food with no added salt & Pt avoids regular sugar/honey reported. Of note Pt's HbA1c level 7.8% (9/25). Pt likes to drink Apple Juice reported. Consistent Carbohydrate diet discussed with Pt including better food choices, foods to avoid. RDN answered questions/concerns related to diet. RDN remains available, Pt made aware.

## 2018-09-26 NOTE — DIETITIAN INITIAL EVALUATION ADULT. - DIET TYPE
renal replacement pts:no protein restr,no conc K & phos, low sodium/1000ml/low sodium/consistent carbohydrate (no snacks)/DASH/TLC (sodium and cholesterol restricted diet)/No Carbonated Beverages, No Chocolate/regular/caffeine free

## 2018-09-26 NOTE — DIETITIAN INITIAL EVALUATION ADULT. - NS AS NUTRI INTERV MEALS SNACK
Diets modified for specific foods and ingredients/Other (specify)/1. Suggest: PO diet rx: Regular, Consistent Carbohydrate (no snacks); Renal Replacement (no prot restr, no conc K, no conc phos, low sodium); Fluid Restriction per MD discretion;        2. Encourage & assist Pt with meals; Monitor PO diet tolerance; Honor food preferences;             3. Monitor labs, daily weights, hydration status;            4. Suggest outpatient follow up with an endocrinologist to ensure long-term DM diet comprehension and compliance. Suggest outpatient follow up with appropriate RD for the purposes of long-term nutrition evaluation and diet education;

## 2018-09-26 NOTE — DIETITIAN INITIAL EVALUATION ADULT. - PERTINENT LABORATORY DATA
(9/26) Hbg 11.0 L, BUN 37 H, Creat 1.76 H, Glu 188 H, Albumin 3.39;                  (9/25) Triglycerides 194 H, HDL 31 H L, HbA1c 7.8% H

## 2018-09-27 LAB
APTT BLD: 38 SEC — HIGH (ref 27.5–37.4)
BUN SERPL-MCNC: 52 MG/DL — HIGH (ref 7–23)
CALCIUM SERPL-MCNC: 10.5 MG/DL — SIGNIFICANT CHANGE UP (ref 8.4–10.5)
CHLORIDE SERPL-SCNC: 98 MMOL/L — SIGNIFICANT CHANGE UP (ref 98–107)
CO2 SERPL-SCNC: 22 MMOL/L — SIGNIFICANT CHANGE UP (ref 22–31)
CREAT SERPL-MCNC: 1.9 MG/DL — HIGH (ref 0.5–1.3)
GLUCOSE BLDC GLUCOMTR-MCNC: 247 MG/DL — HIGH (ref 70–99)
GLUCOSE BLDC GLUCOMTR-MCNC: 310 MG/DL — HIGH (ref 70–99)
GLUCOSE BLDC GLUCOMTR-MCNC: 380 MG/DL — HIGH (ref 70–99)
GLUCOSE BLDC GLUCOMTR-MCNC: 445 MG/DL — HIGH (ref 70–99)
GLUCOSE SERPL-MCNC: 237 MG/DL — HIGH (ref 70–99)
HCT VFR BLD CALC: 35.3 % — SIGNIFICANT CHANGE UP (ref 34.5–45)
HGB BLD-MCNC: 11.5 G/DL — SIGNIFICANT CHANGE UP (ref 11.5–15.5)
MAGNESIUM SERPL-MCNC: 2.3 MG/DL — SIGNIFICANT CHANGE UP (ref 1.6–2.6)
MCHC RBC-ENTMCNC: 29.3 PG — SIGNIFICANT CHANGE UP (ref 27–34)
MCHC RBC-ENTMCNC: 32.6 % — SIGNIFICANT CHANGE UP (ref 32–36)
MCV RBC AUTO: 90.1 FL — SIGNIFICANT CHANGE UP (ref 80–100)
NRBC # FLD: 0 — SIGNIFICANT CHANGE UP
NT-PROBNP SERPL-SCNC: 1479 PG/ML — SIGNIFICANT CHANGE UP
PLATELET # BLD AUTO: 284 K/UL — SIGNIFICANT CHANGE UP (ref 150–400)
PMV BLD: 8.9 FL — SIGNIFICANT CHANGE UP (ref 7–13)
POTASSIUM SERPL-MCNC: 4.1 MMOL/L — SIGNIFICANT CHANGE UP (ref 3.5–5.3)
POTASSIUM SERPL-SCNC: 4.1 MMOL/L — SIGNIFICANT CHANGE UP (ref 3.5–5.3)
RBC # BLD: 3.92 M/UL — SIGNIFICANT CHANGE UP (ref 3.8–5.2)
RBC # FLD: 15.2 % — HIGH (ref 10.3–14.5)
SODIUM SERPL-SCNC: 140 MMOL/L — SIGNIFICANT CHANGE UP (ref 135–145)
WBC # BLD: 10.09 K/UL — SIGNIFICANT CHANGE UP (ref 3.8–10.5)
WBC # FLD AUTO: 10.09 K/UL — SIGNIFICANT CHANGE UP (ref 3.8–10.5)

## 2018-09-27 RX ORDER — INSULIN GLARGINE 100 [IU]/ML
15 INJECTION, SOLUTION SUBCUTANEOUS AT BEDTIME
Qty: 0 | Refills: 0 | Status: DISCONTINUED | OUTPATIENT
Start: 2018-09-27 | End: 2018-09-28

## 2018-09-27 RX ORDER — INSULIN LISPRO 100/ML
8 VIAL (ML) SUBCUTANEOUS
Qty: 0 | Refills: 0 | Status: DISCONTINUED | OUTPATIENT
Start: 2018-09-27 | End: 2018-09-28

## 2018-09-27 RX ADMIN — TICAGRELOR 90 MILLIGRAM(S): 90 TABLET ORAL at 17:15

## 2018-09-27 RX ADMIN — Medication 81 MILLIGRAM(S): at 13:05

## 2018-09-27 RX ADMIN — TICAGRELOR 90 MILLIGRAM(S): 90 TABLET ORAL at 05:38

## 2018-09-27 RX ADMIN — INSULIN GLARGINE 15 UNIT(S): 100 INJECTION, SOLUTION SUBCUTANEOUS at 22:12

## 2018-09-27 RX ADMIN — Medication 650 MILLIGRAM(S): at 21:14

## 2018-09-27 RX ADMIN — Medication 2: at 09:16

## 2018-09-27 RX ADMIN — ATORVASTATIN CALCIUM 40 MILLIGRAM(S): 80 TABLET, FILM COATED ORAL at 21:14

## 2018-09-27 RX ADMIN — Medication 650 MILLIGRAM(S): at 05:38

## 2018-09-27 RX ADMIN — Medication 50 MICROGRAM(S): at 05:38

## 2018-09-27 RX ADMIN — MONTELUKAST 10 MILLIGRAM(S): 4 TABLET, CHEWABLE ORAL at 21:14

## 2018-09-27 RX ADMIN — Medication 4: at 17:57

## 2018-09-27 RX ADMIN — Medication 325 MILLIGRAM(S): at 13:06

## 2018-09-27 RX ADMIN — Medication 650 MILLIGRAM(S): at 13:05

## 2018-09-27 RX ADMIN — Medication 12.5 MILLIGRAM(S): at 17:14

## 2018-09-27 RX ADMIN — Medication 3: at 22:12

## 2018-09-27 RX ADMIN — Medication 60 MILLIGRAM(S): at 05:38

## 2018-09-27 RX ADMIN — Medication 1 TABLET(S): at 13:06

## 2018-09-27 RX ADMIN — Medication 8 UNIT(S): at 17:57

## 2018-09-27 RX ADMIN — Medication 6: at 13:05

## 2018-09-27 RX ADMIN — Medication 60 MILLIGRAM(S): at 17:15

## 2018-09-27 RX ADMIN — Medication 12.5 MILLIGRAM(S): at 05:38

## 2018-09-27 NOTE — PROGRESS NOTE ADULT - PROBLEM SELECTOR PLAN 1
Pt with CKD sec to long standing DM/HTN and CHF.   Baseline SCr 1.5-1.8  Renal function slightly worsen today, possible sec to hyperglycemia on lasix  Patient lost 1.7kg of weight today, clinically improving.   better DM control, endo on board. consider decrease lasix 40mg bid if renal function further worsen  Monitor bmp  Avoid nephrotoxic agents, NSAIDS, RCA Pt with CKD sec to long standing DM/HTN and CHF.   Baseline SCr 1.5-1.8  Renal function slightly worsen today, possible sec to hyperglycemia and on lasix  Patient lost 1.7kg of weight today, clinically improving.   Optimize  DM control, endo on board. consider decrease lasix 40mg bid if renal function further worsens  Monitor bmp  Avoid nephrotoxic agents, NSAIDS, RCA

## 2018-09-27 NOTE — PROGRESS NOTE ADULT - ATTENDING COMMENTS
Patient seen and examined.  Agree with above.   -continue with IV diuresis  -follow up renal     Corie Ga MD

## 2018-09-27 NOTE — PROGRESS NOTE ADULT - PROBLEM SELECTOR PLAN 1
Will start Lantus 15 units at bed time.  Will start Humalog 8 units before each meal in addition to Humalog correction scale coverage.  Patient counseled for compliance with consistent low carb diet.

## 2018-09-27 NOTE — PROVIDER CONTACT NOTE (OTHER) - BACKGROUND
as per pt daughter in law, pt takes 30 units of insulin before meals at home. however not receiving premeal insulin here

## 2018-09-28 LAB
BUN SERPL-MCNC: 66 MG/DL — HIGH (ref 7–23)
CALCIUM SERPL-MCNC: 10.4 MG/DL — SIGNIFICANT CHANGE UP (ref 8.4–10.5)
CHLORIDE SERPL-SCNC: 96 MMOL/L — LOW (ref 98–107)
CO2 SERPL-SCNC: 22 MMOL/L — SIGNIFICANT CHANGE UP (ref 22–31)
CREAT SERPL-MCNC: 1.96 MG/DL — HIGH (ref 0.5–1.3)
GLUCOSE BLDC GLUCOMTR-MCNC: 305 MG/DL — HIGH (ref 70–99)
GLUCOSE BLDC GLUCOMTR-MCNC: 317 MG/DL — HIGH (ref 70–99)
GLUCOSE BLDC GLUCOMTR-MCNC: 328 MG/DL — HIGH (ref 70–99)
GLUCOSE BLDC GLUCOMTR-MCNC: 363 MG/DL — HIGH (ref 70–99)
GLUCOSE SERPL-MCNC: 261 MG/DL — HIGH (ref 70–99)
HCT VFR BLD CALC: 35.7 % — SIGNIFICANT CHANGE UP (ref 34.5–45)
HGB BLD-MCNC: 11.7 G/DL — SIGNIFICANT CHANGE UP (ref 11.5–15.5)
MCHC RBC-ENTMCNC: 29.9 PG — SIGNIFICANT CHANGE UP (ref 27–34)
MCHC RBC-ENTMCNC: 32.8 % — SIGNIFICANT CHANGE UP (ref 32–36)
MCV RBC AUTO: 91.3 FL — SIGNIFICANT CHANGE UP (ref 80–100)
NRBC # FLD: 0 — SIGNIFICANT CHANGE UP
PLATELET # BLD AUTO: 293 K/UL — SIGNIFICANT CHANGE UP (ref 150–400)
PMV BLD: 9.1 FL — SIGNIFICANT CHANGE UP (ref 7–13)
POTASSIUM SERPL-MCNC: 3.9 MMOL/L — SIGNIFICANT CHANGE UP (ref 3.5–5.3)
POTASSIUM SERPL-SCNC: 3.9 MMOL/L — SIGNIFICANT CHANGE UP (ref 3.5–5.3)
RBC # BLD: 3.91 M/UL — SIGNIFICANT CHANGE UP (ref 3.8–5.2)
RBC # FLD: 15 % — HIGH (ref 10.3–14.5)
SODIUM SERPL-SCNC: 137 MMOL/L — SIGNIFICANT CHANGE UP (ref 135–145)
WBC # BLD: 9.78 K/UL — SIGNIFICANT CHANGE UP (ref 3.8–10.5)
WBC # FLD AUTO: 9.78 K/UL — SIGNIFICANT CHANGE UP (ref 3.8–10.5)

## 2018-09-28 RX ORDER — POLYETHYLENE GLYCOL 3350 17 G/17G
17 POWDER, FOR SOLUTION ORAL ONCE
Qty: 0 | Refills: 0 | Status: COMPLETED | OUTPATIENT
Start: 2018-09-28 | End: 2018-09-28

## 2018-09-28 RX ORDER — INSULIN LISPRO 100/ML
12 VIAL (ML) SUBCUTANEOUS
Qty: 0 | Refills: 0 | Status: DISCONTINUED | OUTPATIENT
Start: 2018-09-28 | End: 2018-09-30

## 2018-09-28 RX ORDER — FUROSEMIDE 40 MG
40 TABLET ORAL
Qty: 0 | Refills: 0 | Status: DISCONTINUED | OUTPATIENT
Start: 2018-09-28 | End: 2018-09-29

## 2018-09-28 RX ORDER — INSULIN LISPRO 100/ML
10 VIAL (ML) SUBCUTANEOUS
Qty: 0 | Refills: 0 | Status: DISCONTINUED | OUTPATIENT
Start: 2018-09-28 | End: 2018-09-28

## 2018-09-28 RX ORDER — INSULIN GLARGINE 100 [IU]/ML
22 INJECTION, SOLUTION SUBCUTANEOUS AT BEDTIME
Qty: 0 | Refills: 0 | Status: DISCONTINUED | OUTPATIENT
Start: 2018-09-28 | End: 2018-09-28

## 2018-09-28 RX ORDER — DOCUSATE SODIUM 100 MG
100 CAPSULE ORAL THREE TIMES A DAY
Qty: 0 | Refills: 0 | Status: DISCONTINUED | OUTPATIENT
Start: 2018-09-28 | End: 2018-10-03

## 2018-09-28 RX ORDER — INSULIN GLARGINE 100 [IU]/ML
30 INJECTION, SOLUTION SUBCUTANEOUS AT BEDTIME
Qty: 0 | Refills: 0 | Status: DISCONTINUED | OUTPATIENT
Start: 2018-09-28 | End: 2018-10-01

## 2018-09-28 RX ADMIN — Medication 4: at 09:07

## 2018-09-28 RX ADMIN — Medication 8 UNIT(S): at 09:07

## 2018-09-28 RX ADMIN — Medication 50 MICROGRAM(S): at 05:13

## 2018-09-28 RX ADMIN — Medication 1 TABLET(S): at 09:08

## 2018-09-28 RX ADMIN — Medication 12 UNIT(S): at 17:42

## 2018-09-28 RX ADMIN — INSULIN GLARGINE 30 UNIT(S): 100 INJECTION, SOLUTION SUBCUTANEOUS at 21:27

## 2018-09-28 RX ADMIN — Medication 650 MILLIGRAM(S): at 05:13

## 2018-09-28 RX ADMIN — POLYETHYLENE GLYCOL 3350 17 GRAM(S): 17 POWDER, FOR SOLUTION ORAL at 20:12

## 2018-09-28 RX ADMIN — ATORVASTATIN CALCIUM 40 MILLIGRAM(S): 80 TABLET, FILM COATED ORAL at 21:17

## 2018-09-28 RX ADMIN — Medication 60 MILLIGRAM(S): at 05:13

## 2018-09-28 RX ADMIN — MONTELUKAST 10 MILLIGRAM(S): 4 TABLET, CHEWABLE ORAL at 21:17

## 2018-09-28 RX ADMIN — Medication 100 MILLIGRAM(S): at 21:17

## 2018-09-28 RX ADMIN — Medication 12.5 MILLIGRAM(S): at 05:13

## 2018-09-28 RX ADMIN — Medication 40 MILLIGRAM(S): at 17:38

## 2018-09-28 RX ADMIN — TICAGRELOR 90 MILLIGRAM(S): 90 TABLET ORAL at 17:25

## 2018-09-28 RX ADMIN — Medication 325 MILLIGRAM(S): at 09:08

## 2018-09-28 RX ADMIN — Medication 650 MILLIGRAM(S): at 13:07

## 2018-09-28 RX ADMIN — TICAGRELOR 90 MILLIGRAM(S): 90 TABLET ORAL at 05:13

## 2018-09-28 RX ADMIN — Medication 12 UNIT(S): at 13:07

## 2018-09-28 RX ADMIN — Medication 81 MILLIGRAM(S): at 09:08

## 2018-09-28 RX ADMIN — Medication 4: at 13:07

## 2018-09-28 RX ADMIN — Medication 2: at 21:27

## 2018-09-28 RX ADMIN — Medication 5: at 17:42

## 2018-09-28 NOTE — PROGRESS NOTE ADULT - PROBLEM SELECTOR PLAN 1
Will start Lantus 30 units at bed time.  Will start Humalog 12 units before each meal in addition to Humalog correction scale coverage.  Patient counseled for compliance with consistent low carb diet.

## 2018-09-28 NOTE — PROGRESS NOTE ADULT - PROBLEM SELECTOR PLAN 1
Pt with CKD sec to long standing DM/HTN and CHF.   Baseline SCr 1.5-1.8  Renal function continue to worsen today, possible sec to hyperglycemia and on lasix  clinically fluid status seems to be improving. lasix has been d/c, but did get lasix 60mg iv this AM. monitor BMP, if renal function stable and DM better controlled, would consider starting lasix 40mg iv bid tmr.   Optimize  DM control, endo on board.   Monitor bmp  Avoid nephrotoxic agents, NSAIDS, RCA Pt with CKD sec to long standing DM/HTN and CHF.   Baseline SCr 1.5-1.8  Renal function mildly elevated,   clinically improving fluid status,  received  lasix 60mg iv this AM.   monitor BMP, if renal function stable and DM better controlled, would consider starting lasix 40mg iv bid tmr.   Optimize  DM control, endo on board.   Monitor bmp  Avoid nephrotoxic agents, NSAIDS, RCA

## 2018-09-28 NOTE — PROGRESS NOTE ADULT - PROBLEM SELECTOR PLAN 3
in setting of CKD  Monitor serum CO2  On oral bicarb in setting of CKD  Monitor serum CO2  D/c oral bicarb

## 2018-09-29 DIAGNOSIS — E87.6 HYPOKALEMIA: ICD-10-CM

## 2018-09-29 LAB
BUN SERPL-MCNC: 72 MG/DL — HIGH (ref 7–23)
CALCIUM SERPL-MCNC: 10.1 MG/DL — SIGNIFICANT CHANGE UP (ref 8.4–10.5)
CHLORIDE SERPL-SCNC: 95 MMOL/L — LOW (ref 98–107)
CO2 SERPL-SCNC: 24 MMOL/L — SIGNIFICANT CHANGE UP (ref 22–31)
CREAT SERPL-MCNC: 1.96 MG/DL — HIGH (ref 0.5–1.3)
GLUCOSE BLDC GLUCOMTR-MCNC: 184 MG/DL — HIGH (ref 70–99)
GLUCOSE BLDC GLUCOMTR-MCNC: 204 MG/DL — HIGH (ref 70–99)
GLUCOSE BLDC GLUCOMTR-MCNC: 271 MG/DL — HIGH (ref 70–99)
GLUCOSE BLDC GLUCOMTR-MCNC: 422 MG/DL — HIGH (ref 70–99)
GLUCOSE SERPL-MCNC: 173 MG/DL — HIGH (ref 70–99)
HCT VFR BLD CALC: 35.8 % — SIGNIFICANT CHANGE UP (ref 34.5–45)
HGB BLD-MCNC: 11.9 G/DL — SIGNIFICANT CHANGE UP (ref 11.5–15.5)
MAGNESIUM SERPL-MCNC: 2.3 MG/DL — SIGNIFICANT CHANGE UP (ref 1.6–2.6)
MCHC RBC-ENTMCNC: 29.4 PG — SIGNIFICANT CHANGE UP (ref 27–34)
MCHC RBC-ENTMCNC: 33.2 % — SIGNIFICANT CHANGE UP (ref 32–36)
MCV RBC AUTO: 88.4 FL — SIGNIFICANT CHANGE UP (ref 80–100)
NRBC # FLD: 0 — SIGNIFICANT CHANGE UP
PLATELET # BLD AUTO: 295 K/UL — SIGNIFICANT CHANGE UP (ref 150–400)
PMV BLD: 8.9 FL — SIGNIFICANT CHANGE UP (ref 7–13)
POTASSIUM SERPL-MCNC: 3.4 MMOL/L — LOW (ref 3.5–5.3)
POTASSIUM SERPL-SCNC: 3.4 MMOL/L — LOW (ref 3.5–5.3)
RBC # BLD: 4.05 M/UL — SIGNIFICANT CHANGE UP (ref 3.8–5.2)
RBC # FLD: 14.7 % — HIGH (ref 10.3–14.5)
SODIUM SERPL-SCNC: 139 MMOL/L — SIGNIFICANT CHANGE UP (ref 135–145)
WBC # BLD: 10.38 K/UL — SIGNIFICANT CHANGE UP (ref 3.8–10.5)
WBC # FLD AUTO: 10.38 K/UL — SIGNIFICANT CHANGE UP (ref 3.8–10.5)

## 2018-09-29 RX ORDER — FUROSEMIDE 40 MG
40 TABLET ORAL
Qty: 0 | Refills: 0 | Status: DISCONTINUED | OUTPATIENT
Start: 2018-09-29 | End: 2018-10-01

## 2018-09-29 RX ORDER — POLYETHYLENE GLYCOL 3350 17 G/17G
17 POWDER, FOR SOLUTION ORAL ONCE
Qty: 0 | Refills: 0 | Status: COMPLETED | OUTPATIENT
Start: 2018-09-29 | End: 2018-09-29

## 2018-09-29 RX ORDER — SENNA PLUS 8.6 MG/1
2 TABLET ORAL AT BEDTIME
Qty: 0 | Refills: 0 | Status: DISCONTINUED | OUTPATIENT
Start: 2018-09-29 | End: 2018-10-03

## 2018-09-29 RX ORDER — POTASSIUM CHLORIDE 20 MEQ
20 PACKET (EA) ORAL ONCE
Qty: 0 | Refills: 0 | Status: COMPLETED | OUTPATIENT
Start: 2018-09-29 | End: 2018-09-29

## 2018-09-29 RX ADMIN — Medication 12.5 MILLIGRAM(S): at 05:33

## 2018-09-29 RX ADMIN — Medication 12 UNIT(S): at 08:42

## 2018-09-29 RX ADMIN — Medication 50 MICROGRAM(S): at 05:33

## 2018-09-29 RX ADMIN — Medication 40 MILLIGRAM(S): at 17:16

## 2018-09-29 RX ADMIN — Medication 12.5 MILLIGRAM(S): at 17:14

## 2018-09-29 RX ADMIN — Medication 100 MILLIGRAM(S): at 05:33

## 2018-09-29 RX ADMIN — INSULIN GLARGINE 30 UNIT(S): 100 INJECTION, SOLUTION SUBCUTANEOUS at 21:51

## 2018-09-29 RX ADMIN — TICAGRELOR 90 MILLIGRAM(S): 90 TABLET ORAL at 17:14

## 2018-09-29 RX ADMIN — Medication 1: at 12:56

## 2018-09-29 RX ADMIN — ATORVASTATIN CALCIUM 40 MILLIGRAM(S): 80 TABLET, FILM COATED ORAL at 21:27

## 2018-09-29 RX ADMIN — Medication 40 MILLIGRAM(S): at 05:34

## 2018-09-29 RX ADMIN — Medication 81 MILLIGRAM(S): at 08:30

## 2018-09-29 RX ADMIN — POLYETHYLENE GLYCOL 3350 17 GRAM(S): 17 POWDER, FOR SOLUTION ORAL at 08:30

## 2018-09-29 RX ADMIN — Medication 2: at 08:42

## 2018-09-29 RX ADMIN — Medication 3: at 18:01

## 2018-09-29 RX ADMIN — TICAGRELOR 90 MILLIGRAM(S): 90 TABLET ORAL at 05:33

## 2018-09-29 RX ADMIN — Medication 325 MILLIGRAM(S): at 08:30

## 2018-09-29 RX ADMIN — MONTELUKAST 10 MILLIGRAM(S): 4 TABLET, CHEWABLE ORAL at 21:27

## 2018-09-29 RX ADMIN — Medication 12 UNIT(S): at 18:01

## 2018-09-29 RX ADMIN — Medication 12 UNIT(S): at 12:56

## 2018-09-29 RX ADMIN — Medication 4: at 21:51

## 2018-09-29 RX ADMIN — Medication 20 MILLIEQUIVALENT(S): at 08:30

## 2018-09-29 RX ADMIN — Medication 1 TABLET(S): at 08:30

## 2018-09-29 NOTE — PROVIDER CONTACT NOTE (OTHER) - ACTION/TREATMENT ORDERED:
pt given insulin according to sliding scale. will cont to monitor
Notified tele alex [facility]. Supplemental insulin to be given as per order

## 2018-09-29 NOTE — PROGRESS NOTE ADULT - PROBLEM SELECTOR PLAN 1
Pt with CKD sec to long standing DM/HTN and CHF.   Baseline SCr 1.5-1.8  Renal function mildly elevated,   clinically improving fluid status,  received  lasix 40mg iv this AM. now lasix changed to PO  Monitor bmp  Avoid nephrotoxic agents, NSAIDS, RCA

## 2018-09-29 NOTE — PROGRESS NOTE ADULT - ATTENDING COMMENTS
Patient seen and examined.  Agree with above.   -volume status improved  -change to po lasix  -follow up renal    Corie Ga MD

## 2018-09-30 LAB
BUN SERPL-MCNC: 75 MG/DL — HIGH (ref 7–23)
CALCIUM SERPL-MCNC: 9.6 MG/DL — SIGNIFICANT CHANGE UP (ref 8.4–10.5)
CHLORIDE SERPL-SCNC: 97 MMOL/L — LOW (ref 98–107)
CO2 SERPL-SCNC: 24 MMOL/L — SIGNIFICANT CHANGE UP (ref 22–31)
CREAT SERPL-MCNC: 1.96 MG/DL — HIGH (ref 0.5–1.3)
GLUCOSE BLDC GLUCOMTR-MCNC: 216 MG/DL — HIGH (ref 70–99)
GLUCOSE BLDC GLUCOMTR-MCNC: 267 MG/DL — HIGH (ref 70–99)
GLUCOSE BLDC GLUCOMTR-MCNC: 280 MG/DL — HIGH (ref 70–99)
GLUCOSE BLDC GLUCOMTR-MCNC: 300 MG/DL — HIGH (ref 70–99)
GLUCOSE SERPL-MCNC: 212 MG/DL — HIGH (ref 70–99)
HCT VFR BLD CALC: 37.3 % — SIGNIFICANT CHANGE UP (ref 34.5–45)
HGB BLD-MCNC: 12.2 G/DL — SIGNIFICANT CHANGE UP (ref 11.5–15.5)
MAGNESIUM SERPL-MCNC: 2.4 MG/DL — SIGNIFICANT CHANGE UP (ref 1.6–2.6)
MCHC RBC-ENTMCNC: 29.8 PG — SIGNIFICANT CHANGE UP (ref 27–34)
MCHC RBC-ENTMCNC: 32.7 % — SIGNIFICANT CHANGE UP (ref 32–36)
MCV RBC AUTO: 91.2 FL — SIGNIFICANT CHANGE UP (ref 80–100)
NRBC # FLD: 0 — SIGNIFICANT CHANGE UP
PLATELET # BLD AUTO: 295 K/UL — SIGNIFICANT CHANGE UP (ref 150–400)
PMV BLD: 9.1 FL — SIGNIFICANT CHANGE UP (ref 7–13)
POTASSIUM SERPL-MCNC: 3.6 MMOL/L — SIGNIFICANT CHANGE UP (ref 3.5–5.3)
POTASSIUM SERPL-SCNC: 3.6 MMOL/L — SIGNIFICANT CHANGE UP (ref 3.5–5.3)
RBC # BLD: 4.09 M/UL — SIGNIFICANT CHANGE UP (ref 3.8–5.2)
RBC # FLD: 14.9 % — HIGH (ref 10.3–14.5)
SODIUM SERPL-SCNC: 138 MMOL/L — SIGNIFICANT CHANGE UP (ref 135–145)
WBC # BLD: 10.29 K/UL — SIGNIFICANT CHANGE UP (ref 3.8–10.5)
WBC # FLD AUTO: 10.29 K/UL — SIGNIFICANT CHANGE UP (ref 3.8–10.5)

## 2018-09-30 RX ORDER — INSULIN LISPRO 100/ML
14 VIAL (ML) SUBCUTANEOUS
Qty: 0 | Refills: 0 | Status: DISCONTINUED | OUTPATIENT
Start: 2018-09-30 | End: 2018-10-01

## 2018-09-30 RX ADMIN — Medication 3: at 13:05

## 2018-09-30 RX ADMIN — Medication 12 UNIT(S): at 13:06

## 2018-09-30 RX ADMIN — Medication 3: at 17:42

## 2018-09-30 RX ADMIN — Medication 12.5 MILLIGRAM(S): at 05:42

## 2018-09-30 RX ADMIN — SENNA PLUS 2 TABLET(S): 8.6 TABLET ORAL at 21:42

## 2018-09-30 RX ADMIN — Medication 40 MILLIGRAM(S): at 17:41

## 2018-09-30 RX ADMIN — Medication 100 MILLIGRAM(S): at 13:05

## 2018-09-30 RX ADMIN — Medication 40 MILLIGRAM(S): at 05:42

## 2018-09-30 RX ADMIN — Medication 1: at 22:25

## 2018-09-30 RX ADMIN — Medication 325 MILLIGRAM(S): at 13:05

## 2018-09-30 RX ADMIN — Medication 50 MICROGRAM(S): at 05:41

## 2018-09-30 RX ADMIN — ATORVASTATIN CALCIUM 40 MILLIGRAM(S): 80 TABLET, FILM COATED ORAL at 21:42

## 2018-09-30 RX ADMIN — Medication 100 MILLIGRAM(S): at 05:41

## 2018-09-30 RX ADMIN — TICAGRELOR 90 MILLIGRAM(S): 90 TABLET ORAL at 05:43

## 2018-09-30 RX ADMIN — Medication 12 UNIT(S): at 09:10

## 2018-09-30 RX ADMIN — Medication 2: at 09:10

## 2018-09-30 RX ADMIN — INSULIN GLARGINE 30 UNIT(S): 100 INJECTION, SOLUTION SUBCUTANEOUS at 22:25

## 2018-09-30 RX ADMIN — Medication 14 UNIT(S): at 17:42

## 2018-09-30 RX ADMIN — Medication 100 MILLIGRAM(S): at 21:42

## 2018-09-30 RX ADMIN — Medication 81 MILLIGRAM(S): at 13:05

## 2018-09-30 RX ADMIN — MONTELUKAST 10 MILLIGRAM(S): 4 TABLET, CHEWABLE ORAL at 21:43

## 2018-09-30 RX ADMIN — TICAGRELOR 90 MILLIGRAM(S): 90 TABLET ORAL at 17:41

## 2018-09-30 RX ADMIN — Medication 1 TABLET(S): at 13:05

## 2018-09-30 NOTE — PROGRESS NOTE ADULT - PROBLEM SELECTOR PLAN 1
Pt with CKD sec to long standing DM/HTN and CHF.   Baseline SCr 1.5-1.8  Renal function is stable  clinically improving fluid status, receiving lasix PO  Monitor bmp  Avoid nephrotoxic agents, NSAIDS, RCA

## 2018-09-30 NOTE — PROGRESS NOTE ADULT - ATTENDING COMMENTS
CARDIOLOGY ATTENDING    Agree with above. Continue lasix. CARDIOLOGY ATTENDING    Patient seen and examined. Agree with above. Continue lasix.

## 2018-09-30 NOTE — PROGRESS NOTE ADULT - PROBLEM SELECTOR PLAN 1
Will increase Lantus to 30 units at bed time.  Will increase Humalog to 14 units before each meal in addition to Humalog correction scale coverage.  Patient counseled for compliance with consistent low carb diet.

## 2018-10-01 LAB
BUN SERPL-MCNC: 80 MG/DL — HIGH (ref 7–23)
CALCIUM SERPL-MCNC: 9.5 MG/DL — SIGNIFICANT CHANGE UP (ref 8.4–10.5)
CHLORIDE SERPL-SCNC: 98 MMOL/L — SIGNIFICANT CHANGE UP (ref 98–107)
CO2 SERPL-SCNC: 22 MMOL/L — SIGNIFICANT CHANGE UP (ref 22–31)
CREAT SERPL-MCNC: 2.07 MG/DL — HIGH (ref 0.5–1.3)
GLUCOSE BLDC GLUCOMTR-MCNC: 233 MG/DL — HIGH (ref 70–99)
GLUCOSE BLDC GLUCOMTR-MCNC: 241 MG/DL — HIGH (ref 70–99)
GLUCOSE BLDC GLUCOMTR-MCNC: 261 MG/DL — HIGH (ref 70–99)
GLUCOSE BLDC GLUCOMTR-MCNC: 363 MG/DL — HIGH (ref 70–99)
GLUCOSE SERPL-MCNC: 227 MG/DL — HIGH (ref 70–99)
HCT VFR BLD CALC: 36.4 % — SIGNIFICANT CHANGE UP (ref 34.5–45)
HGB BLD-MCNC: 11.9 G/DL — SIGNIFICANT CHANGE UP (ref 11.5–15.5)
MAGNESIUM SERPL-MCNC: 2.6 MG/DL — SIGNIFICANT CHANGE UP (ref 1.6–2.6)
MCHC RBC-ENTMCNC: 29.2 PG — SIGNIFICANT CHANGE UP (ref 27–34)
MCHC RBC-ENTMCNC: 32.7 % — SIGNIFICANT CHANGE UP (ref 32–36)
MCV RBC AUTO: 89.4 FL — SIGNIFICANT CHANGE UP (ref 80–100)
NRBC # FLD: 0 — SIGNIFICANT CHANGE UP
PLATELET # BLD AUTO: 316 K/UL — SIGNIFICANT CHANGE UP (ref 150–400)
PMV BLD: 9 FL — SIGNIFICANT CHANGE UP (ref 7–13)
POTASSIUM SERPL-MCNC: 3.7 MMOL/L — SIGNIFICANT CHANGE UP (ref 3.5–5.3)
POTASSIUM SERPL-SCNC: 3.7 MMOL/L — SIGNIFICANT CHANGE UP (ref 3.5–5.3)
RBC # BLD: 4.07 M/UL — SIGNIFICANT CHANGE UP (ref 3.8–5.2)
RBC # FLD: 14.7 % — HIGH (ref 10.3–14.5)
SODIUM SERPL-SCNC: 139 MMOL/L — SIGNIFICANT CHANGE UP (ref 135–145)
WBC # BLD: 10.15 K/UL — SIGNIFICANT CHANGE UP (ref 3.8–10.5)
WBC # FLD AUTO: 10.15 K/UL — SIGNIFICANT CHANGE UP (ref 3.8–10.5)

## 2018-10-01 RX ORDER — SIMETHICONE 80 MG/1
80 TABLET, CHEWABLE ORAL ONCE
Qty: 0 | Refills: 0 | Status: COMPLETED | OUTPATIENT
Start: 2018-10-01 | End: 2018-10-02

## 2018-10-01 RX ORDER — INSULIN GLARGINE 100 [IU]/ML
35 INJECTION, SOLUTION SUBCUTANEOUS AT BEDTIME
Qty: 0 | Refills: 0 | Status: DISCONTINUED | OUTPATIENT
Start: 2018-10-01 | End: 2018-10-03

## 2018-10-01 RX ORDER — INSULIN LISPRO 100/ML
16 VIAL (ML) SUBCUTANEOUS
Qty: 0 | Refills: 0 | Status: DISCONTINUED | OUTPATIENT
Start: 2018-10-01 | End: 2018-10-03

## 2018-10-01 RX ADMIN — Medication 16 UNIT(S): at 12:51

## 2018-10-01 RX ADMIN — Medication 14 UNIT(S): at 08:48

## 2018-10-01 RX ADMIN — Medication 12.5 MILLIGRAM(S): at 17:11

## 2018-10-01 RX ADMIN — Medication 16 UNIT(S): at 17:43

## 2018-10-01 RX ADMIN — Medication 100 MILLIGRAM(S): at 22:44

## 2018-10-01 RX ADMIN — ATORVASTATIN CALCIUM 40 MILLIGRAM(S): 80 TABLET, FILM COATED ORAL at 22:44

## 2018-10-01 RX ADMIN — SENNA PLUS 2 TABLET(S): 8.6 TABLET ORAL at 22:44

## 2018-10-01 RX ADMIN — TICAGRELOR 90 MILLIGRAM(S): 90 TABLET ORAL at 05:30

## 2018-10-01 RX ADMIN — Medication 100 MILLIGRAM(S): at 05:30

## 2018-10-01 RX ADMIN — Medication 2: at 17:43

## 2018-10-01 RX ADMIN — Medication 81 MILLIGRAM(S): at 12:51

## 2018-10-01 RX ADMIN — Medication 2: at 08:48

## 2018-10-01 RX ADMIN — Medication 50 MICROGRAM(S): at 05:30

## 2018-10-01 RX ADMIN — Medication 3: at 12:51

## 2018-10-01 RX ADMIN — TICAGRELOR 90 MILLIGRAM(S): 90 TABLET ORAL at 17:12

## 2018-10-01 RX ADMIN — Medication 12.5 MILLIGRAM(S): at 05:30

## 2018-10-01 RX ADMIN — MONTELUKAST 10 MILLIGRAM(S): 4 TABLET, CHEWABLE ORAL at 22:44

## 2018-10-01 RX ADMIN — Medication 1 TABLET(S): at 12:51

## 2018-10-01 RX ADMIN — Medication 40 MILLIGRAM(S): at 05:30

## 2018-10-01 RX ADMIN — Medication 100 MILLIGRAM(S): at 12:51

## 2018-10-01 RX ADMIN — Medication 3: at 22:44

## 2018-10-01 RX ADMIN — INSULIN GLARGINE 35 UNIT(S): 100 INJECTION, SOLUTION SUBCUTANEOUS at 22:44

## 2018-10-01 RX ADMIN — Medication 325 MILLIGRAM(S): at 12:51

## 2018-10-01 NOTE — PROGRESS NOTE ADULT - PROBLEM SELECTOR PLAN 1
Pt with CKD sec to long standing DM/HTN and CHF.   Baseline SCr 1.5-1.8  renal function worsening, glucose not controlled, clinically improving fluid status. consider holding lasix for 1-2 doses.   Monitor bmp  Avoid nephrotoxic agents, NSAIDS, RCA

## 2018-10-01 NOTE — PROGRESS NOTE ADULT - PROBLEM SELECTOR PLAN 1
Will increase Lantus to 35 units at bed time.  Will increase Humalog to 16 units before each meal in addition to Humalog correction scale coverage.  Patient counseled for compliance with consistent low carb diet.

## 2018-10-01 NOTE — PROGRESS NOTE ADULT - ATTENDING COMMENTS
Agree with above assessment and plan as outlined above.    - D/C planning when ok with renal    Vargas Adame MD, FACC

## 2018-10-02 LAB
B PERT DNA SPEC QL NAA+PROBE: SIGNIFICANT CHANGE UP
BUN SERPL-MCNC: 74 MG/DL — HIGH (ref 7–23)
C PNEUM DNA SPEC QL NAA+PROBE: NOT DETECTED — SIGNIFICANT CHANGE UP
CALCIUM SERPL-MCNC: 8.7 MG/DL — SIGNIFICANT CHANGE UP (ref 8.4–10.5)
CHLORIDE SERPL-SCNC: 99 MMOL/L — SIGNIFICANT CHANGE UP (ref 98–107)
CO2 SERPL-SCNC: 21 MMOL/L — LOW (ref 22–31)
CREAT SERPL-MCNC: 1.97 MG/DL — HIGH (ref 0.5–1.3)
FLUAV H1 2009 PAND RNA SPEC QL NAA+PROBE: NOT DETECTED — SIGNIFICANT CHANGE UP
FLUAV H1 RNA SPEC QL NAA+PROBE: NOT DETECTED — SIGNIFICANT CHANGE UP
FLUAV H3 RNA SPEC QL NAA+PROBE: NOT DETECTED — SIGNIFICANT CHANGE UP
FLUAV SUBTYP SPEC NAA+PROBE: SIGNIFICANT CHANGE UP
FLUBV RNA SPEC QL NAA+PROBE: NOT DETECTED — SIGNIFICANT CHANGE UP
GLUCOSE BLDC GLUCOMTR-MCNC: 123 MG/DL — HIGH (ref 70–99)
GLUCOSE BLDC GLUCOMTR-MCNC: 250 MG/DL — HIGH (ref 70–99)
GLUCOSE BLDC GLUCOMTR-MCNC: 284 MG/DL — HIGH (ref 70–99)
GLUCOSE BLDC GLUCOMTR-MCNC: 286 MG/DL — HIGH (ref 70–99)
GLUCOSE SERPL-MCNC: 231 MG/DL — HIGH (ref 70–99)
HADV DNA SPEC QL NAA+PROBE: NOT DETECTED — SIGNIFICANT CHANGE UP
HCOV 229E RNA SPEC QL NAA+PROBE: NOT DETECTED — SIGNIFICANT CHANGE UP
HCOV HKU1 RNA SPEC QL NAA+PROBE: NOT DETECTED — SIGNIFICANT CHANGE UP
HCOV NL63 RNA SPEC QL NAA+PROBE: NOT DETECTED — SIGNIFICANT CHANGE UP
HCOV OC43 RNA SPEC QL NAA+PROBE: NOT DETECTED — SIGNIFICANT CHANGE UP
HCT VFR BLD CALC: 36 % — SIGNIFICANT CHANGE UP (ref 34.5–45)
HGB BLD-MCNC: 11.6 G/DL — SIGNIFICANT CHANGE UP (ref 11.5–15.5)
HMPV RNA SPEC QL NAA+PROBE: NOT DETECTED — SIGNIFICANT CHANGE UP
HPIV1 RNA SPEC QL NAA+PROBE: NOT DETECTED — SIGNIFICANT CHANGE UP
HPIV2 RNA SPEC QL NAA+PROBE: NOT DETECTED — SIGNIFICANT CHANGE UP
HPIV3 RNA SPEC QL NAA+PROBE: NOT DETECTED — SIGNIFICANT CHANGE UP
HPIV4 RNA SPEC QL NAA+PROBE: NOT DETECTED — SIGNIFICANT CHANGE UP
M PNEUMO DNA SPEC QL NAA+PROBE: NOT DETECTED — SIGNIFICANT CHANGE UP
MAGNESIUM SERPL-MCNC: 2.6 MG/DL — SIGNIFICANT CHANGE UP (ref 1.6–2.6)
MCHC RBC-ENTMCNC: 29.2 PG — SIGNIFICANT CHANGE UP (ref 27–34)
MCHC RBC-ENTMCNC: 32.2 % — SIGNIFICANT CHANGE UP (ref 32–36)
MCV RBC AUTO: 90.7 FL — SIGNIFICANT CHANGE UP (ref 80–100)
NRBC # FLD: 0 — SIGNIFICANT CHANGE UP
PLATELET # BLD AUTO: 292 K/UL — SIGNIFICANT CHANGE UP (ref 150–400)
PMV BLD: 9 FL — SIGNIFICANT CHANGE UP (ref 7–13)
POTASSIUM SERPL-MCNC: 3.5 MMOL/L — SIGNIFICANT CHANGE UP (ref 3.5–5.3)
POTASSIUM SERPL-SCNC: 3.5 MMOL/L — SIGNIFICANT CHANGE UP (ref 3.5–5.3)
RBC # BLD: 3.97 M/UL — SIGNIFICANT CHANGE UP (ref 3.8–5.2)
RBC # FLD: 14.4 % — SIGNIFICANT CHANGE UP (ref 10.3–14.5)
RSV RNA SPEC QL NAA+PROBE: NOT DETECTED — SIGNIFICANT CHANGE UP
RV+EV RNA SPEC QL NAA+PROBE: NOT DETECTED — SIGNIFICANT CHANGE UP
SODIUM SERPL-SCNC: 137 MMOL/L — SIGNIFICANT CHANGE UP (ref 135–145)
WBC # BLD: 9.94 K/UL — SIGNIFICANT CHANGE UP (ref 3.8–10.5)
WBC # FLD AUTO: 9.94 K/UL — SIGNIFICANT CHANGE UP (ref 3.8–10.5)

## 2018-10-02 RX ADMIN — SIMETHICONE 80 MILLIGRAM(S): 80 TABLET, CHEWABLE ORAL at 00:14

## 2018-10-02 RX ADMIN — SENNA PLUS 2 TABLET(S): 8.6 TABLET ORAL at 22:13

## 2018-10-02 RX ADMIN — ATORVASTATIN CALCIUM 40 MILLIGRAM(S): 80 TABLET, FILM COATED ORAL at 22:13

## 2018-10-02 RX ADMIN — Medication 3: at 09:10

## 2018-10-02 RX ADMIN — Medication 3: at 12:57

## 2018-10-02 RX ADMIN — Medication 100 MILLIGRAM(S): at 22:13

## 2018-10-02 RX ADMIN — Medication 16 UNIT(S): at 17:54

## 2018-10-02 RX ADMIN — Medication 12.5 MILLIGRAM(S): at 17:53

## 2018-10-02 RX ADMIN — Medication 1 TABLET(S): at 12:54

## 2018-10-02 RX ADMIN — Medication 100 MILLIGRAM(S): at 05:15

## 2018-10-02 RX ADMIN — TICAGRELOR 90 MILLIGRAM(S): 90 TABLET ORAL at 05:15

## 2018-10-02 RX ADMIN — Medication 50 MICROGRAM(S): at 05:15

## 2018-10-02 RX ADMIN — Medication 16 UNIT(S): at 09:10

## 2018-10-02 RX ADMIN — INSULIN GLARGINE 35 UNIT(S): 100 INJECTION, SOLUTION SUBCUTANEOUS at 22:13

## 2018-10-02 RX ADMIN — Medication 325 MILLIGRAM(S): at 12:54

## 2018-10-02 RX ADMIN — TICAGRELOR 90 MILLIGRAM(S): 90 TABLET ORAL at 17:53

## 2018-10-02 RX ADMIN — Medication 16 UNIT(S): at 12:58

## 2018-10-02 RX ADMIN — Medication 81 MILLIGRAM(S): at 12:54

## 2018-10-02 RX ADMIN — Medication 12.5 MILLIGRAM(S): at 05:15

## 2018-10-02 RX ADMIN — MONTELUKAST 10 MILLIGRAM(S): 4 TABLET, CHEWABLE ORAL at 22:13

## 2018-10-02 NOTE — PROGRESS NOTE ADULT - PROBLEM SELECTOR PLAN 1
Pt with CKD sec to long standing DM/HTN and CHF.   Baseline SCr 1.5-1.8  Lasix on hold last dose 10/1 AM, renal function improving today.   optimize DM control, resume lasix when renal function further improves    Monitor bmp  Avoid nephrotoxic agents, NSAIDS, RCA Pt with CKD sec to long standing DM/HTN and CHF.   Baseline SCr 1.5-1.8  Lasix on hold last dose 10/1 AM,   renal function improving today.   optimize DM control,   resume lasix when renal function further improves    Monitor bmp  Avoid nephrotoxic agents, NSAIDS, RCA

## 2018-10-03 ENCOUNTER — TRANSCRIPTION ENCOUNTER (OUTPATIENT)
Age: 73
End: 2018-10-03

## 2018-10-03 VITALS
SYSTOLIC BLOOD PRESSURE: 127 MMHG | DIASTOLIC BLOOD PRESSURE: 63 MMHG | TEMPERATURE: 98 F | RESPIRATION RATE: 17 BRPM | HEART RATE: 68 BPM | OXYGEN SATURATION: 100 %

## 2018-10-03 LAB
BUN SERPL-MCNC: 57 MG/DL — HIGH (ref 7–23)
CALCIUM SERPL-MCNC: 9.1 MG/DL — SIGNIFICANT CHANGE UP (ref 8.4–10.5)
CHLORIDE SERPL-SCNC: 105 MMOL/L — SIGNIFICANT CHANGE UP (ref 98–107)
CO2 SERPL-SCNC: 21 MMOL/L — LOW (ref 22–31)
CREAT SERPL-MCNC: 1.59 MG/DL — HIGH (ref 0.5–1.3)
GLUCOSE BLDC GLUCOMTR-MCNC: 112 MG/DL — HIGH (ref 70–99)
GLUCOSE BLDC GLUCOMTR-MCNC: 152 MG/DL — HIGH (ref 70–99)
GLUCOSE SERPL-MCNC: 110 MG/DL — HIGH (ref 70–99)
HCT VFR BLD CALC: 34.5 % — SIGNIFICANT CHANGE UP (ref 34.5–45)
HGB BLD-MCNC: 11.2 G/DL — LOW (ref 11.5–15.5)
MAGNESIUM SERPL-MCNC: 2.7 MG/DL — HIGH (ref 1.6–2.6)
MCHC RBC-ENTMCNC: 29.3 PG — SIGNIFICANT CHANGE UP (ref 27–34)
MCHC RBC-ENTMCNC: 32.5 % — SIGNIFICANT CHANGE UP (ref 32–36)
MCV RBC AUTO: 90.3 FL — SIGNIFICANT CHANGE UP (ref 80–100)
NRBC # FLD: 0 — SIGNIFICANT CHANGE UP
PLATELET # BLD AUTO: 295 K/UL — SIGNIFICANT CHANGE UP (ref 150–400)
PMV BLD: 8.8 FL — SIGNIFICANT CHANGE UP (ref 7–13)
POTASSIUM SERPL-MCNC: 3.5 MMOL/L — SIGNIFICANT CHANGE UP (ref 3.5–5.3)
POTASSIUM SERPL-SCNC: 3.5 MMOL/L — SIGNIFICANT CHANGE UP (ref 3.5–5.3)
RBC # BLD: 3.82 M/UL — SIGNIFICANT CHANGE UP (ref 3.8–5.2)
RBC # FLD: 14.4 % — SIGNIFICANT CHANGE UP (ref 10.3–14.5)
SODIUM SERPL-SCNC: 142 MMOL/L — SIGNIFICANT CHANGE UP (ref 135–145)
WBC # BLD: 9.92 K/UL — SIGNIFICANT CHANGE UP (ref 3.8–10.5)
WBC # FLD AUTO: 9.92 K/UL — SIGNIFICANT CHANGE UP (ref 3.8–10.5)

## 2018-10-03 RX ORDER — FUROSEMIDE 40 MG
1 TABLET ORAL
Qty: 60 | Refills: 0 | OUTPATIENT
Start: 2018-10-03 | End: 2018-11-01

## 2018-10-03 RX ORDER — SENNA PLUS 8.6 MG/1
2 TABLET ORAL
Qty: 60 | Refills: 0 | OUTPATIENT
Start: 2018-10-03 | End: 2018-11-01

## 2018-10-03 RX ORDER — DOCUSATE SODIUM 100 MG
1 CAPSULE ORAL
Qty: 90 | Refills: 0 | OUTPATIENT
Start: 2018-10-03 | End: 2018-11-01

## 2018-10-03 RX ORDER — FUROSEMIDE 40 MG
40 TABLET ORAL
Qty: 0 | Refills: 0 | Status: DISCONTINUED | OUTPATIENT
Start: 2018-10-03 | End: 2018-10-03

## 2018-10-03 RX ORDER — INSULIN GLULISINE 100 [IU]/ML
16 INJECTION, SOLUTION SUBCUTANEOUS
Qty: 1 | Refills: 0 | OUTPATIENT
Start: 2018-10-03

## 2018-10-03 RX ORDER — INSULIN GLARGINE 100 [IU]/ML
35 INJECTION, SOLUTION SUBCUTANEOUS
Qty: 5 | Refills: 0 | OUTPATIENT
Start: 2018-10-03

## 2018-10-03 RX ORDER — INSULIN GLULISINE 100 [IU]/ML
30 INJECTION, SOLUTION SUBCUTANEOUS
Qty: 0 | Refills: 0 | COMMUNITY
Start: 2018-10-03

## 2018-10-03 RX ADMIN — TICAGRELOR 90 MILLIGRAM(S): 90 TABLET ORAL at 05:36

## 2018-10-03 RX ADMIN — Medication 100 MILLIGRAM(S): at 05:36

## 2018-10-03 RX ADMIN — Medication 50 MICROGRAM(S): at 05:37

## 2018-10-03 RX ADMIN — Medication 1 TABLET(S): at 09:07

## 2018-10-03 RX ADMIN — Medication 325 MILLIGRAM(S): at 09:07

## 2018-10-03 RX ADMIN — Medication 12.5 MILLIGRAM(S): at 05:35

## 2018-10-03 RX ADMIN — Medication 81 MILLIGRAM(S): at 09:07

## 2018-10-03 RX ADMIN — Medication 16 UNIT(S): at 09:06

## 2018-10-03 NOTE — DISCHARGE NOTE ADULT - SECONDARY DIAGNOSIS.
Elevated troponin Type 2 diabetes mellitus with hypoglycemia without coma, with long-term current use of insulin CAD (coronary artery disease) MERVIN (acute kidney injury) Hypothyroidism Hyperlipidemia

## 2018-10-03 NOTE — DISCHARGE NOTE ADULT - PATIENT PORTAL LINK FT
You can access the [facility] Patient Portal, offered by [facility], by registering with the following website: [email or web address]

## 2018-10-03 NOTE — PROGRESS NOTE ADULT - PROBLEM SELECTOR PROBLEM 2
Hypothyroidism
SOB (shortness of breath)

## 2018-10-03 NOTE — DISCHARGE NOTE ADULT - CARE PLAN
Principal Discharge DX:	Acute on chronic systolic (congestive) heart failure  Goal:	To relieve and prevent worsening symptoms associated with congestive heart failure, to improve quality of life, and to treat underlying conditions such as coronary heart disease, high blood pressure, or diabetes, and to maintain euvolemia.  Assessment and plan of treatment:	Continue Lasix 40mg twice a day. Low salt diet, fluid restriction to 1500 ml daily, monitor your fluid intake and weight daily, exercise as tolerated 30 minutes daily, and follow up with your physician within 1 to 2 weeks.  Secondary Diagnosis:	Elevated troponin  Assessment and plan of treatment:	Low suspicion for ACS. Continue Aspirin, Brilinta, Atorvastatin, Metoprolol. Follow up with your primary care physician and Cardiologist within 1-2 weeks. Call for appointments.  Secondary Diagnosis:	Type 2 diabetes mellitus with hypoglycemia without coma, with long-term current use of insulin  Assessment and plan of treatment:	You were started on Lantus 35U at bedtime and Humalog 16U three times a day before meals. Monitor finger sticks pre-meal and bedtime, low salt, fat and carbohydrate diet, minimize glucose intake.  Exercise daily for at least 30 minutes and weight loss.  Follow up with primary care physician and endocrinologist for routine Hemoglobin A1C checks and management. You may follow up with Dr. Banks (Endocrinologist) located at [address] [place] [address], Phone: [phone]. Follow up with your ophthalmologist for routine yearly vision exams.  Secondary Diagnosis:	CAD (coronary artery disease)  Assessment and plan of treatment:	Continue aspirin, Brilinta, and Atorvastatin, do not stop unless instructed by your physician.  Continue low salt, fat, cholesterol and carbohydrate diet. Follow up with cardiologist and primary care physician's routine appointment.  Secondary Diagnosis:	MERVIN (acute kidney injury)  Assessment and plan of treatment:	Renal function improving. Continue blood pressure, cholesterol and diabetic medications. Goal of hemoglobin A1C (HgbA1C) < 7%.  Avoid nephrotoxic drugs such as nonsteroidal anti-inflammatory agents (NSAIDs).   Please follow up with your nephrologist or primary care physician to monitor your kidney function, continue with low protein and potassium diet.  Secondary Diagnosis:	Hypothyroidism  Assessment and plan of treatment:	Continue Levothyroxine. Follow up with your Endocrinologist routinely to monitor your Thyroid Function Tests.  Secondary Diagnosis:	Hyperlipidemia  Assessment and plan of treatment:	Continue Atorvastatin. Low fat diet, exercise daily. Follow up with primary care physician and cardiologist for management.

## 2018-10-03 NOTE — PROGRESS NOTE ADULT - PROBLEM SELECTOR PLAN 2
in setting of HF (EF 37%) in 9/2018   Continue diuretics per cardiology , (currently on lasix 60mg bid iv)   Monitor daily weight, strict I/O  f/u cardio.
Continue current Synthroid dose FU
in setting of HF (EF 37%) in 9/2018   Continue diuretics per cardiology , (currently on lasix 60mg bid iv)   Monitor daily weight, strict I/O  f/u cardio.
in setting of HF (EF 37%) in 9/2018   Monitor daily weight, strict I/O  f/u cardio.

## 2018-10-03 NOTE — PROGRESS NOTE ADULT - SUBJECTIVE AND OBJECTIVE BOX
SUBJECTIVE: SOB improved, c/o LLE and Left foot pain with ambulation.      MEDICATIONS  (STANDING):  aspirin enteric coated 81 milliGRAM(s) Oral daily  atorvastatin 40 milliGRAM(s) Oral at bedtime  dextrose 5%. 1000 milliLiter(s) (50 mL/Hr) IV Continuous <Continuous>  dextrose 50% Injectable 12.5 Gram(s) IV Push once  dextrose 50% Injectable 25 Gram(s) IV Push once  dextrose 50% Injectable 25 Gram(s) IV Push once  ferrous    sulfate 325 milliGRAM(s) Oral daily  furosemide   Injectable 60 milliGRAM(s) IV Push two times a day  heparin  Infusion. 500 Unit(s)/Hr (5 mL/Hr) IV Continuous <Continuous>  insulin lispro (HumaLOG) corrective regimen sliding scale   SubCutaneous three times a day before meals  insulin lispro (HumaLOG) corrective regimen sliding scale   SubCutaneous at bedtime  levothyroxine 50 MICROGram(s) Oral daily  metoprolol tartrate 12.5 milliGRAM(s) Oral two times a day  montelukast 10 milliGRAM(s) Oral at bedtime  multivitamin 1 Tablet(s) Oral daily  sodium bicarbonate 650 milliGRAM(s) Oral three times a day  ticagrelor 90 milliGRAM(s) Oral two times a day    MEDICATIONS  (PRN):  dextrose 40% Gel 15 Gram(s) Oral once PRN Blood Glucose LESS THAN 70 milliGRAM(s)/deciliter  glucagon  Injectable 1 milliGRAM(s) IntraMuscular once PRN Glucose LESS THAN 70 milligrams/deciliter  heparin  Injectable 3500 Unit(s) IV Push every 6 hours PRN For aPTT less than 40      LABS:                        11.0   9.60  )-----------( 275      ( 26 Sep 2018 04:00 )             34.6       140  |  102  |  37<H>  ----------------------------<  188<H>  3.9   |  23  |  1.76<H>    Ca    9.4      26 Sep 2018 06:00  Phos  3.2     09-26  Mg     2.3     09-26    TPro  7.6  /  Alb  3.9  /  TBili  0.6  /  DBili  x   /  AST  26  /  ALT  15  /  AlkPhos  69  09-26    PTT - ( 26 Sep 2018 07:47 )  PTT:59.2 SEC    Serum Pro-Brain Natriuretic Peptide: 2483 pg/mL (09-25 @ 01:50)      PHYSICAL EXAM:  Vital Signs Last 24 Hrs  T(C): 36.4 (26 Sep 2018 12:04), Max: 37 (25 Sep 2018 15:31)  T(F): 97.6 (26 Sep 2018 12:04), Max: 98.6 (25 Sep 2018 15:31)  HR: 67 (26 Sep 2018 12:04) (67 - 76)  BP: 121/70 (26 Sep 2018 12:04) (98/54 - 121/70)  RR: 18 (26 Sep 2018 12:04) (18 - 20)  SpO2: 100% (26 Sep 2018 12:04) (100% - 100%)    Cardiovascular:  S1S2 RRR, JVP 16  Respiratory: Bibasilar crackles  Gastrointestinal: Abdomen soft, ND, NT, +BS  Skin: Warm, dry, intact. No rash.  Musculoskeletal: Normal ROM, normal strength  Ext: No C/C/E B/L LE    DIAGNOSTIC DATA  TELEMETRY: NSR    CARDIAC MARKERS ( 25 Sep 2018 10:30 )  x     / x     / 119 u/L / 6.21 ng/mL / x      CARDIAC MARKERS ( 25 Sep 2018 04:20 )  x     / x     / 146 u/L / 7.15 ng/mL / x      CARDIAC MARKERS ( 25 Sep 2018 01:50 )  x     / x     / 170 u/L / 7.97 ng/mL / x          Troponin T, High Sensitivity (09.25.18 @ 10:30)    Troponin T, High Sensitivity: 857  Troponin T, High Sensitivity (09.25.18 @ 04:20)    Troponin T, High Sensitivity: 816  Troponin T, High Sensitivity (09.25.18 @ 01:50)    Troponin T, High Sensitivity: 724      < from: TTE with Doppler (w/Cont) (09.06.18 @ 15:21) >  CONCLUSIONS: EF 37%  1. Mitral annular calcification, otherwise normal mitral  valve. Mild mitral regurgitation.  2. Normal left ventricular internal dimensions and wall  thicknesses.  3. Endocardium not well visualized; grossly moderate to  severe global left ventricular systolic dysfunction.  The  inferolateral wall appears particularly hypokinetic.  Endocardial visualization enhanced with intravenous  injection of echo contrast (Definity).  4. The right ventricle is not well visualized; grossly  normal right ventricular systolic function.    < end of copied text >    < from: Cardiac Cath Lab - Adult (02.06.18 @ 16:26) >  VENTRICLES: No left ventriculogram was performed.  CORONARY VESSELS: The coronary circulation is right dominant.  LM:   --  LM:Angiography showed mild atherosclerosis with no flow limiting  lesions.  LAD:   --  Proximal LAD: There was a diffuse 90 % stenosis at a site with  no prior intervention. The lesion was eccentric. There was a large  vascular territory distal to the lesion and good blood supply to the  distal myocardium from a graft.  CX:   --  Proximal circumflex: There was a diffuse 40 % stenosis at a site  with no prior intervention. The lesion was eccentric. There was PARUL grade  3 flow through the vessel (brisk flow).  RI:   --  Proximal ramus intermedius: There was a diffuse 10 % stenosis at  the site of a prior stent. The lesion was smoothly contoured. There was  PARUL grade 3 flow through the vessel (brisk flow).  RCA:   --  Mid RCA: There was a 100 % stenosis. There was a moderate-sized  vascular territory distal to the lesion and poor collateral blood supply  to the distal myocardium. This lesion is a chronic total occlusion.  GRAFTS:   --  Graft to the LAD: The graft was a LIMA. Graft angiography  showed no degeneration. Distal vessel angiography showed mild diffuse  disease.  COMPLICATIONS: There were no complications. No complications occurred  during the cath lab visit.  DIAGNOSTIC IMPRESSIONS: Patent GOMES to LAD  Patent stent ramus   of RCA , collateralized from LAD  DIAGNOSTIC RECOMMENDATIONS: Medical therapy  INTERVENTIONAL IMPRESSIONS: Patent GOMES to LAD  Patent stent ramus   of RCA , collateralized from LAD  INTERVENTIONAL RECOMMENDATIONS: Medical therapy  Prepared and signed by  Gretchen Guerra M.D.  Signed 02/15/2018 19:03:05    < end of copied text >      ASSESSMENT AND PLAN:    71 y/o Female PMH HTN, mild CKD, Hyperlipidemia, DM-II, CAD s/p 3V CABG  (LIMA to LAD, SVG to OM, SVG to PDA) at [facility] 2014, s/p CORNELIA TO RI 11/17/17, NSTEMI 12/2017 s/p LHC on 12/5 and CORNELIA to dRamus, TTE at that time revealed grossly moderate to severe LV dysfunction with hypokinesis of the inferior, lateral and inferolateral walls with an EF of 36% s/p LHC on 2/6  with no new OBS CAD, managed medically, admitted with chest pain, ZEE, acute on chronic systolic HF    --CONT IV diuresis, much improved  --Elevated Trop T with neg CPK and no acute EKG changes, low suspicion for ACS, can cont to trend  --LLE doppler and L foot xrays  --PT eval  --DC heparin GTT
pt seen and examined, family at bedside, denies CP, reports nasal congestion today    MEDICATIONS  (STANDING):  aspirin enteric coated 81 milliGRAM(s) Oral daily  atorvastatin 40 milliGRAM(s) Oral at bedtime  docusate sodium 100 milliGRAM(s) Oral three times a day  ferrous    sulfate 325 milliGRAM(s) Oral daily  insulin glargine Injectable (LANTUS) 35 Unit(s) SubCutaneous at bedtime  insulin lispro (HumaLOG) corrective regimen sliding scale   SubCutaneous three times a day before meals  insulin lispro (HumaLOG) corrective regimen sliding scale   SubCutaneous at bedtime  insulin lispro Injectable (HumaLOG) 16 Unit(s) SubCutaneous three times a day before meals  levothyroxine 50 MICROGram(s) Oral daily  metoprolol tartrate 12.5 milliGRAM(s) Oral two times a day  montelukast 10 milliGRAM(s) Oral at bedtime  multivitamin 1 Tablet(s) Oral daily  senna 2 Tablet(s) Oral at bedtime  ticagrelor 90 milliGRAM(s) Oral two times a day    MEDICATIONS  (PRN):  dextrose 40% Gel 15 Gram(s) Oral once PRN Blood Glucose LESS THAN 70 milliGRAM(s)/deciliter  glucagon  Injectable 1 milliGRAM(s) IntraMuscular once PRN Glucose LESS THAN 70 milligrams/deciliter      LABS:                        11.6   9.94  )-----------( 292      ( 02 Oct 2018 06:15 )             36.0     137  |  99  |  74<H>  ----------------------------<  231<H>  3.5   |  21<L>  |  1.97<H>    Ca    8.7      02 Oct 2018 06:15  Mg     2.6     10-02    Creatinine Trend: 1.97<--, 2.07<--, 1.96<--, 1.96<--, 1.96<--, 1.90<--     PHYSICAL EXAM  Vital Signs Last 24 Hrs  T(C): 36.3 (02 Oct 2018 12:26), Max: 37 (02 Oct 2018 05:00)  T(F): 97.4 (02 Oct 2018 12:26), Max: 98.6 (02 Oct 2018 05:00)  HR: 72 (02 Oct 2018 12:26) (67 - 77)  BP: 112/64 (02 Oct 2018 12:26) (112/64 - 130/67)  RR: 16 (02 Oct 2018 12:26) (16 - 18)  SpO2: 100% (02 Oct 2018 12:26) (98% - 100%)    Cardiovascular:  S1S2 RRR,   Respiratory: grossly CTA BL  Gastrointestinal: Abdomen soft, ND, NT, +BS  Skin: Warm, dry, intact. No rash.  Musculoskeletal: Normal ROM, normal strength  Ext: No C/C/E B/L LE    DIAGNOSTIC DATA  TELEMETRY: NSR    < from: TTE with Doppler (w/Cont) (09.06.18 @ 15:21) >  CONCLUSIONS: EF 37%  1. Mitral annular calcification, otherwise normal mitral  valve. Mild mitral regurgitation.  2. Normal left ventricular internal dimensions and wall  thicknesses.  3. Endocardium not well visualized; grossly moderate to  severe global left ventricular systolic dysfunction.  The  inferolateral wall appears particularly hypokinetic.  Endocardial visualization enhanced with intravenous  injection of echo contrast (Definity).  4. The right ventricle is not well visualized; grossly  normal right ventricular systolic function.    < end of copied text >    < from: Cardiac Cath Lab - Adult (02.06.18 @ 16:26) >  VENTRICLES: No left ventriculogram was performed.  CORONARY VESSELS: The coronary circulation is right dominant.  LM:   --  LM:Angiography showed mild atherosclerosis with no flow limiting  lesions.  LAD:   --  Proximal LAD: There was a diffuse 90 % stenosis at a site with  no prior intervention. The lesion was eccentric. There was a large  vascular territory distal to the lesion and good blood supply to the  distal myocardium from a graft.  CX:   --  Proximal circumflex: There was a diffuse 40 % stenosis at a site  with no prior intervention. The lesion was eccentric. There was PARUL grade  3 flow through the vessel (brisk flow).  RI:   --  Proximal ramus intermedius: There was a diffuse 10 % stenosis at  the site of a prior stent. The lesion was smoothly contoured. There was  PARUL grade 3 flow through the vessel (brisk flow).  RCA:   --  Mid RCA: There was a 100 % stenosis. There was a moderate-sized  vascular territory distal to the lesion and poor collateral blood supply  to the distal myocardium. This lesion is a chronic total occlusion.  GRAFTS:   --  Graft to the LAD: The graft was a LIMA. Graft angiography  showed no degeneration. Distal vessel angiography showed mild diffuse  disease.  COMPLICATIONS: There were no complications. No complications occurred  during the cath lab visit.  DIAGNOSTIC IMPRESSIONS: Patent GOMES to LAD  Patent stent ramus   of RCA , collateralized from LAD  DIAGNOSTIC RECOMMENDATIONS: Medical therapy  INTERVENTIONAL IMPRESSIONS: Patent GOMES to LAD  Patent stent ramus   of RCA , collateralized from LAD  INTERVENTIONAL RECOMMENDATIONS: Medical therapy  Prepared and signed by  Gretchen Guerra M.D.  Signed 02/15/2018 19:03:05    < end of copied text >      < from: US Duplex Venous Lower Ext Ltd, Left (09.26.18 @ 13:05) >  IMPRESSION:     No evidence of left lower extremity deep venous thrombosis.    < end of copied text >    < from: Xray Foot AP + Lateral, Left (09.26.18 @ 14:01) >  IMPRESSION:   No subcutaneous gas, radiopaque foreign bodies, or radiographic evidence   for osteomyelitis.    No fractures or dislocations.    Tarsometatarsal alignment maintained without evidence for a Lisfranc   injury.    Preserved joint spaces and no joint margin erosions.    Small Víctor deformity and calcaneal enthesophytes.    Generalized osteopenia otherwise no discrete lytic or blastic lesions.    Vascular calcifications.    < end of copied text >      ASSESSMENT AND PLAN:    71 y/o Female PMH HTN, mild CKD, Hyperlipidemia, DM-II, CAD s/p 3V CABG  (LIMA to LAD, SVG to OM, SVG to PDA) at [facility] 2014, s/p CORNELIA TO RI 11/17/17, NSTEMI 12/2017 s/p C on 12/5 and CORNELIA to dRam, TTE at that time revealed grossly moderate to severe LV dysfunction with hypokinesis of the inferior, lateral and inferolateral walls with an EF of 36% s/p LHC on 2/6  with no new OBS CAD, managed medically, admitted with chest pain, ZEE, acute on chronic systolic HF    --clinically euvolemic with rising creatinine, stable today. Cont to hold lasix for another 24 hours per Renal  --RVP 10/2 negative  --PT  --cont Asa, Brilinta, statin, Metoprolol
Chief complaint  Patient is a 72y old  Female who presents with a chief complaint of SOB, ZEE and Midsternal chest heaviness (01 Oct 2018 12:57)   Review of systems  Patient in bed, looks comfortable, no fever, no hypoglycemia.    Labs and Fingersticks  CAPILLARY BLOOD GLUCOSE      POCT Blood Glucose.: 261 mg/dL (01 Oct 2018 12:28)  POCT Blood Glucose.: 233 mg/dL (01 Oct 2018 08:42)  POCT Blood Glucose.: 300 mg/dL (30 Sep 2018 22:18)  POCT Blood Glucose.: 267 mg/dL (30 Sep 2018 17:19)          Calcium, Total Serum: 9.5 (10-01 @ 05:50)  Calcium, Total Serum: 9.6 (09-30 @ 07:34)          10-01    139  |  98  |  80<H>  ----------------------------<  227<H>  3.7   |  22  |  2.07<H>    Ca    9.5      01 Oct 2018 05:50  Mg     2.6     10-01                          11.9   10.15 )-----------( 316      ( 01 Oct 2018 05:50 )             36.4     Medications  MEDICATIONS  (STANDING):  aspirin enteric coated 81 milliGRAM(s) Oral daily  atorvastatin 40 milliGRAM(s) Oral at bedtime  dextrose 5%. 1000 milliLiter(s) (50 mL/Hr) IV Continuous <Continuous>  dextrose 50% Injectable 12.5 Gram(s) IV Push once  dextrose 50% Injectable 25 Gram(s) IV Push once  dextrose 50% Injectable 25 Gram(s) IV Push once  docusate sodium 100 milliGRAM(s) Oral three times a day  ferrous    sulfate 325 milliGRAM(s) Oral daily  insulin glargine Injectable (LANTUS) 35 Unit(s) SubCutaneous at bedtime  insulin lispro (HumaLOG) corrective regimen sliding scale   SubCutaneous three times a day before meals  insulin lispro (HumaLOG) corrective regimen sliding scale   SubCutaneous at bedtime  insulin lispro Injectable (HumaLOG) 16 Unit(s) SubCutaneous three times a day before meals  levothyroxine 50 MICROGram(s) Oral daily  metoprolol tartrate 12.5 milliGRAM(s) Oral two times a day  montelukast 10 milliGRAM(s) Oral at bedtime  multivitamin 1 Tablet(s) Oral daily  senna 2 Tablet(s) Oral at bedtime  ticagrelor 90 milliGRAM(s) Oral two times a day      Physical Exam  General: Patient comfortable in bed  Vital Signs Last 12 Hrs  T(F): 97.5 (10-01-18 @ 12:15), Max: 97.5 (10-01-18 @ 12:15)  HR: 76 (10-01-18 @ 12:15) (73 - 76)  BP: 118/62 (10-01-18 @ 12:15) (113/68 - 118/62)  BP(mean): --  RR: 16 (10-01-18 @ 12:15) (16 - 16)  SpO2: 100% (10-01-18 @ 12:15) (100% - 100%)  Neck: No palpable thyroid nodules.  CVS: S1S2, No murmurs  Respiratory: No wheezing, no crepitations  GI: Abdomen soft, bowel sounds positive  Musculoskeletal:  edema lower extremities.   Skin: No skin rashes, no ecchymosis    Diagnostics
Chief complaint  Patient is a 72y old  Female who presents with a chief complaint of SOB, ZEE and Midsternal chest heaviness (02 Oct 2018 14:02)   Review of systems  Patient in bed, looks comfortable, no fever, no hypoglycemia.    Labs and Fingersticks  CAPILLARY BLOOD GLUCOSE      POCT Blood Glucose.: 123 mg/dL (02 Oct 2018 17:39)  POCT Blood Glucose.: 286 mg/dL (02 Oct 2018 12:36)  POCT Blood Glucose.: 284 mg/dL (02 Oct 2018 09:07)  POCT Blood Glucose.: 363 mg/dL (01 Oct 2018 22:00)          Calcium, Total Serum: 8.7 (10-02 @ 06:15)  Calcium, Total Serum: 9.5 (10-01 @ 05:50)          10-02    137  |  99  |  74<H>  ----------------------------<  231<H>  3.5   |  21<L>  |  1.97<H>    Ca    8.7      02 Oct 2018 06:15  Mg     2.6     10-02                          11.6   9.94  )-----------( 292      ( 02 Oct 2018 06:15 )             36.0     Medications  MEDICATIONS  (STANDING):  aspirin enteric coated 81 milliGRAM(s) Oral daily  atorvastatin 40 milliGRAM(s) Oral at bedtime  dextrose 5%. 1000 milliLiter(s) (50 mL/Hr) IV Continuous <Continuous>  dextrose 50% Injectable 12.5 Gram(s) IV Push once  dextrose 50% Injectable 25 Gram(s) IV Push once  dextrose 50% Injectable 25 Gram(s) IV Push once  docusate sodium 100 milliGRAM(s) Oral three times a day  ferrous    sulfate 325 milliGRAM(s) Oral daily  insulin glargine Injectable (LANTUS) 35 Unit(s) SubCutaneous at bedtime  insulin lispro (HumaLOG) corrective regimen sliding scale   SubCutaneous three times a day before meals  insulin lispro (HumaLOG) corrective regimen sliding scale   SubCutaneous at bedtime  insulin lispro Injectable (HumaLOG) 16 Unit(s) SubCutaneous three times a day before meals  levothyroxine 50 MICROGram(s) Oral daily  metoprolol tartrate 12.5 milliGRAM(s) Oral two times a day  montelukast 10 milliGRAM(s) Oral at bedtime  multivitamin 1 Tablet(s) Oral daily  senna 2 Tablet(s) Oral at bedtime  ticagrelor 90 milliGRAM(s) Oral two times a day      Physical Exam  General: Patient comfortable in bed  Vital Signs Last 12 Hrs  T(F): 97.4 (10-02-18 @ 12:26), Max: 97.4 (10-02-18 @ 12:26)  HR: 74 (10-02-18 @ 17:51) (72 - 78)  BP: 114/60 (10-02-18 @ 17:51) (112/64 - 114/60)  BP(mean): --  RR: 16 (10-02-18 @ 12:26) (16 - 16)  SpO2: 100% (10-02-18 @ 12:26) (100% - 100%)  Neck: No palpable thyroid nodules.  CVS: S1S2, No murmurs  Respiratory: No wheezing, no crepitations  GI: Abdomen soft, bowel sounds positive  Musculoskeletal:  edema lower extremities.   Skin: No skin rashes, no ecchymosis    Diagnostics
Chief complaint  Patient is a 72y old  Female who presents with a chief complaint of SOB, ZEE and Midsternal chest heaviness (03 Oct 2018 11:37)   Review of systems  Patient in bed, looks comfortable, no fever, no hypoglycemia.    Labs and Fingersticks  CAPILLARY BLOOD GLUCOSE  112 (03 Oct 2018 09:31)      POCT Blood Glucose.: 152 mg/dL (03 Oct 2018 12:27)  POCT Blood Glucose.: 112 mg/dL (03 Oct 2018 08:27)  POCT Blood Glucose.: 250 mg/dL (02 Oct 2018 22:10)  POCT Blood Glucose.: 123 mg/dL (02 Oct 2018 17:39)          Calcium, Total Serum: 9.1 (10-03 @ 06:19)  Calcium, Total Serum: 8.7 (10-02 @ 06:15)          10-03    142  |  105  |  57<H>  ----------------------------<  110<H>  3.5   |  21<L>  |  1.59<H>    Ca    9.1      03 Oct 2018 06:19  Mg     2.7     10-03                          11.2   9.92  )-----------( 295      ( 03 Oct 2018 07:19 )             34.5     Medications  MEDICATIONS  (STANDING):  aspirin enteric coated 81 milliGRAM(s) Oral daily  atorvastatin 40 milliGRAM(s) Oral at bedtime  dextrose 5%. 1000 milliLiter(s) (50 mL/Hr) IV Continuous <Continuous>  dextrose 50% Injectable 12.5 Gram(s) IV Push once  dextrose 50% Injectable 25 Gram(s) IV Push once  dextrose 50% Injectable 25 Gram(s) IV Push once  docusate sodium 100 milliGRAM(s) Oral three times a day  ferrous    sulfate 325 milliGRAM(s) Oral daily  furosemide    Tablet 40 milliGRAM(s) Oral two times a day  insulin glargine Injectable (LANTUS) 35 Unit(s) SubCutaneous at bedtime  insulin lispro (HumaLOG) corrective regimen sliding scale   SubCutaneous three times a day before meals  insulin lispro (HumaLOG) corrective regimen sliding scale   SubCutaneous at bedtime  insulin lispro Injectable (HumaLOG) 16 Unit(s) SubCutaneous three times a day before meals  levothyroxine 50 MICROGram(s) Oral daily  metoprolol tartrate 12.5 milliGRAM(s) Oral two times a day  montelukast 10 milliGRAM(s) Oral at bedtime  multivitamin 1 Tablet(s) Oral daily  senna 2 Tablet(s) Oral at bedtime  ticagrelor 90 milliGRAM(s) Oral two times a day      Physical Exam  General: Patient comfortable in bed  Vital Signs Last 12 Hrs  T(F): 97.5 (10-03-18 @ 12:11), Max: 97.8 (10-03-18 @ 05:00)  HR: 68 (10-03-18 @ 12:11) (68 - 74)  BP: 127/63 (10-03-18 @ 12:11) (117/70 - 127/63)  BP(mean): --  RR: 17 (10-03-18 @ 12:11) (16 - 17)  SpO2: 100% (10-03-18 @ 12:11) (98% - 100%)  Neck: No palpable thyroid nodules.  CVS: S1S2, No murmurs  Respiratory: No wheezing, no crepitations  GI: Abdomen soft, bowel sounds positive  Musculoskeletal:  edema lower extremities.   Skin: No skin rashes, no ecchymosis    Diagnostics
Chief complaint  Patient is a 72y old  Female who presents with a chief complaint of SOB, ZEE and Midsternal chest heaviness (26 Sep 2018 12:19)   Review of systems  Patient in bed, looks comfortable, no fever, no hypoglycemia.    Labs and Fingersticks  CAPILLARY BLOOD GLUCOSE      POCT Blood Glucose.: 270 mg/dL (26 Sep 2018 13:12)  POCT Blood Glucose.: 186 mg/dL (26 Sep 2018 08:54)  POCT Blood Glucose.: 302 mg/dL (25 Sep 2018 21:05)  POCT Blood Glucose.: 119 mg/dL (25 Sep 2018 17:39)        Hemoglobin A1C, Whole Blood: 7.8 <H> (09-25 @ 04:20)    Calcium, Total Serum: 9.4 (09-26 @ 06:00)  Calcium, Total Serum: 9.0 (09-25 @ 04:20)  Calcium, Total Serum: 9.4 (09-25 @ 01:50)  Albumin, Serum: 3.9 (09-26 @ 06:00)  Albumin, Serum: 4.2 (09-25 @ 01:50)    Alanine Aminotransferase (ALT/SGPT): 15 (09-26 @ 06:00)  Alanine Aminotransferase (ALT/SGPT): 16 (09-25 @ 01:50)  Alkaline Phosphatase, Serum: 69 (09-26 @ 06:00)  Alkaline Phosphatase, Serum: 67 (09-25 @ 01:50)  Aspartate Aminotransferase (AST/SGOT): 26 (09-26 @ 06:00)  Aspartate Aminotransferase (AST/SGOT): 35 <H> (09-25 @ 01:50)        09-26    140  |  102  |  37<H>  ----------------------------<  188<H>  3.9   |  23  |  1.76<H>    Ca    9.4      26 Sep 2018 06:00  Phos  3.2     09-26  Mg     2.3     09-26    TPro  7.6  /  Alb  3.9  /  TBili  0.6  /  DBili  x   /  AST  26  /  ALT  15  /  AlkPhos  69  09-26                        11.0   9.60  )-----------( 275      ( 26 Sep 2018 04:00 )             34.6     Medications  MEDICATIONS  (STANDING):  aspirin enteric coated 81 milliGRAM(s) Oral daily  atorvastatin 40 milliGRAM(s) Oral at bedtime  dextrose 5%. 1000 milliLiter(s) (50 mL/Hr) IV Continuous <Continuous>  dextrose 50% Injectable 12.5 Gram(s) IV Push once  dextrose 50% Injectable 25 Gram(s) IV Push once  dextrose 50% Injectable 25 Gram(s) IV Push once  ferrous    sulfate 325 milliGRAM(s) Oral daily  furosemide   Injectable 60 milliGRAM(s) IV Push two times a day  insulin lispro (HumaLOG) corrective regimen sliding scale   SubCutaneous three times a day before meals  insulin lispro (HumaLOG) corrective regimen sliding scale   SubCutaneous at bedtime  levothyroxine 50 MICROGram(s) Oral daily  metoprolol tartrate 12.5 milliGRAM(s) Oral two times a day  montelukast 10 milliGRAM(s) Oral at bedtime  multivitamin 1 Tablet(s) Oral daily  sodium bicarbonate 650 milliGRAM(s) Oral three times a day  ticagrelor 90 milliGRAM(s) Oral two times a day      Physical Exam  General: Patient comfortable in bed  Vital Signs Last 12 Hrs  T(F): 97.6 (09-26-18 @ 12:04), Max: 97.6 (09-26-18 @ 12:04)  HR: 67 (09-26-18 @ 12:04) (67 - 74)  BP: 121/70 (09-26-18 @ 12:04) (106/55 - 121/70)  BP(mean): --  RR: 18 (09-26-18 @ 12:04) (18 - 19)  SpO2: 100% (09-26-18 @ 12:04) (100% - 100%)  Neck: No palpable thyroid nodules.  CVS: S1S2, No murmurs  Respiratory: No wheezing, no crepitations  GI: Abdomen soft, bowel sounds positive  Musculoskeletal:  edema lower extremities.   Skin: No skin rashes, no ecchymosis    Diagnostics
Chief complaint  Patient is a 72y old  Female who presents with a chief complaint of SOB, ZEE and Midsternal chest heaviness (27 Sep 2018 09:38)   Review of systems  Patient in bed, looks comfortable, no fever, no hypoglycemia.    Labs and Fingersticks  CAPILLARY BLOOD GLUCOSE      POCT Blood Glucose.: 445 mg/dL (27 Sep 2018 12:56)  POCT Blood Glucose.: 247 mg/dL (27 Sep 2018 08:53)  POCT Blood Glucose.: 332 mg/dL (26 Sep 2018 21:14)  POCT Blood Glucose.: 244 mg/dL (26 Sep 2018 17:23)          Calcium, Total Serum: 10.5 (09-27 @ 06:23)  Calcium, Total Serum: 9.4 (09-26 @ 06:00)  Albumin, Serum: 3.9 (09-26 @ 06:00)    Alanine Aminotransferase (ALT/SGPT): 15 (09-26 @ 06:00)  Alkaline Phosphatase, Serum: 69 (09-26 @ 06:00)  Aspartate Aminotransferase (AST/SGOT): 26 (09-26 @ 06:00)        09-27    140  |  98  |  52<H>  ----------------------------<  237<H>  4.1   |  22  |  1.90<H>    Ca    10.5      27 Sep 2018 06:23  Phos  3.2     09-26  Mg     2.3     09-27    TPro  7.6  /  Alb  3.9  /  TBili  0.6  /  DBili  x   /  AST  26  /  ALT  15  /  AlkPhos  69  09-26                        11.5   10.09 )-----------( 284      ( 27 Sep 2018 06:23 )             35.3     Medications  MEDICATIONS  (STANDING):  aspirin enteric coated 81 milliGRAM(s) Oral daily  atorvastatin 40 milliGRAM(s) Oral at bedtime  dextrose 5%. 1000 milliLiter(s) (50 mL/Hr) IV Continuous <Continuous>  dextrose 50% Injectable 12.5 Gram(s) IV Push once  dextrose 50% Injectable 25 Gram(s) IV Push once  dextrose 50% Injectable 25 Gram(s) IV Push once  ferrous    sulfate 325 milliGRAM(s) Oral daily  furosemide   Injectable 60 milliGRAM(s) IV Push two times a day  insulin glargine Injectable (LANTUS) 15 Unit(s) SubCutaneous at bedtime  insulin lispro (HumaLOG) corrective regimen sliding scale   SubCutaneous three times a day before meals  insulin lispro (HumaLOG) corrective regimen sliding scale   SubCutaneous at bedtime  insulin lispro Injectable (HumaLOG) 8 Unit(s) SubCutaneous three times a day before meals  levothyroxine 50 MICROGram(s) Oral daily  metoprolol tartrate 12.5 milliGRAM(s) Oral two times a day  montelukast 10 milliGRAM(s) Oral at bedtime  multivitamin 1 Tablet(s) Oral daily  sodium bicarbonate 650 milliGRAM(s) Oral three times a day  ticagrelor 90 milliGRAM(s) Oral two times a day      Physical Exam  General: Patient comfortable in bed  Vital Signs Last 12 Hrs  T(F): 97.2 (09-27-18 @ 12:42), Max: 97.6 (09-27-18 @ 05:36)  HR: 70 (09-27-18 @ 12:42) (70 - 70)  BP: 129/64 (09-27-18 @ 12:42) (122/69 - 129/64)  BP(mean): --  RR: 17 (09-27-18 @ 12:42) (17 - 18)  SpO2: 99% (09-27-18 @ 12:42) (99% - 100%)  Neck: No palpable thyroid nodules.  CVS: S1S2, No murmurs  Respiratory: No wheezing, no crepitations  GI: Abdomen soft, bowel sounds positive  Musculoskeletal:  edema lower extremities.   Skin: No skin rashes, no ecchymosis    Diagnostics
Chief complaint  Patient is a 72y old  Female who presents with a chief complaint of SOB, ZEE and Midsternal chest heaviness (28 Sep 2018 11:28)   Review of systems  Patient in bed, looks comfortable, no fever, no hypoglycemia.    Labs and Fingersticks  CAPILLARY BLOOD GLUCOSE      POCT Blood Glucose.: 305 mg/dL (28 Sep 2018 08:36)  POCT Blood Glucose.: 380 mg/dL (27 Sep 2018 21:42)  POCT Blood Glucose.: 310 mg/dL (27 Sep 2018 17:33)  POCT Blood Glucose.: 445 mg/dL (27 Sep 2018 12:56)          Calcium, Total Serum: 10.4 (09-28 @ 04:55)  Calcium, Total Serum: 10.5 (09-27 @ 06:23)          09-28    137  |  96<L>  |  66<H>  ----------------------------<  261<H>  3.9   |  22  |  1.96<H>    Ca    10.4      28 Sep 2018 04:55  Mg     2.3     09-27                          11.7   9.78  )-----------( 293      ( 28 Sep 2018 04:55 )             35.7     Medications  MEDICATIONS  (STANDING):  aspirin enteric coated 81 milliGRAM(s) Oral daily  atorvastatin 40 milliGRAM(s) Oral at bedtime  dextrose 5%. 1000 milliLiter(s) (50 mL/Hr) IV Continuous <Continuous>  dextrose 50% Injectable 12.5 Gram(s) IV Push once  dextrose 50% Injectable 25 Gram(s) IV Push once  dextrose 50% Injectable 25 Gram(s) IV Push once  ferrous    sulfate 325 milliGRAM(s) Oral daily  insulin glargine Injectable (LANTUS) 30 Unit(s) SubCutaneous at bedtime  insulin lispro (HumaLOG) corrective regimen sliding scale   SubCutaneous three times a day before meals  insulin lispro (HumaLOG) corrective regimen sliding scale   SubCutaneous at bedtime  insulin lispro Injectable (HumaLOG) 12 Unit(s) SubCutaneous three times a day before meals  levothyroxine 50 MICROGram(s) Oral daily  metoprolol tartrate 12.5 milliGRAM(s) Oral two times a day  montelukast 10 milliGRAM(s) Oral at bedtime  multivitamin 1 Tablet(s) Oral daily  sodium bicarbonate 650 milliGRAM(s) Oral three times a day  ticagrelor 90 milliGRAM(s) Oral two times a day      Physical Exam  General: Patient comfortable in bed  Vital Signs Last 12 Hrs  T(F): 97.2 (09-28-18 @ 05:11), Max: 97.2 (09-28-18 @ 05:11)  HR: 78 (09-28-18 @ 05:11) (78 - 78)  BP: 134/80 (09-28-18 @ 05:11) (134/80 - 134/80)  BP(mean): --  RR: 17 (09-28-18 @ 05:11) (17 - 17)  SpO2: 98% (09-28-18 @ 05:11) (98% - 98%)  Neck: No palpable thyroid nodules.  CVS: S1S2, No murmurs  Respiratory: No wheezing, no crepitations  GI: Abdomen soft, bowel sounds positive  Musculoskeletal:  edema lower extremities.   Skin: No skin rashes, no ecchymosis    Diagnostics
Chief complaint  Patient is a 72y old  Female who presents with a chief complaint of SOB, ZEE and Midsternal chest heaviness (29 Sep 2018 11:55)   Review of systems  Patient in bed, looks comfortable, no fever, no hypoglycemia.    Labs and Fingersticks  CAPILLARY BLOOD GLUCOSE      POCT Blood Glucose.: 271 mg/dL (29 Sep 2018 17:12)  POCT Blood Glucose.: 184 mg/dL (29 Sep 2018 12:45)  POCT Blood Glucose.: 204 mg/dL (29 Sep 2018 08:41)  POCT Blood Glucose.: 328 mg/dL (28 Sep 2018 21:23)          Calcium, Total Serum: 10.1 (09-29 @ 05:43)  Calcium, Total Serum: 10.4 (09-28 @ 04:55)          09-29    139  |  95<L>  |  72<H>  ----------------------------<  173<H>  3.4<L>   |  24  |  1.96<H>    Ca    10.1      29 Sep 2018 05:43  Mg     2.3     09-29                          11.9   10.38 )-----------( 295      ( 29 Sep 2018 05:43 )             35.8     Medications  MEDICATIONS  (STANDING):  aspirin enteric coated 81 milliGRAM(s) Oral daily  atorvastatin 40 milliGRAM(s) Oral at bedtime  dextrose 5%. 1000 milliLiter(s) (50 mL/Hr) IV Continuous <Continuous>  dextrose 50% Injectable 12.5 Gram(s) IV Push once  dextrose 50% Injectable 25 Gram(s) IV Push once  dextrose 50% Injectable 25 Gram(s) IV Push once  docusate sodium 100 milliGRAM(s) Oral three times a day  ferrous    sulfate 325 milliGRAM(s) Oral daily  furosemide    Tablet 40 milliGRAM(s) Oral two times a day  insulin glargine Injectable (LANTUS) 30 Unit(s) SubCutaneous at bedtime  insulin lispro (HumaLOG) corrective regimen sliding scale   SubCutaneous three times a day before meals  insulin lispro (HumaLOG) corrective regimen sliding scale   SubCutaneous at bedtime  insulin lispro Injectable (HumaLOG) 12 Unit(s) SubCutaneous three times a day before meals  levothyroxine 50 MICROGram(s) Oral daily  metoprolol tartrate 12.5 milliGRAM(s) Oral two times a day  montelukast 10 milliGRAM(s) Oral at bedtime  multivitamin 1 Tablet(s) Oral daily  senna 2 Tablet(s) Oral at bedtime  ticagrelor 90 milliGRAM(s) Oral two times a day      Physical Exam  General: Patient comfortable in bed  Vital Signs Last 12 Hrs  T(F): 97.7 (09-29-18 @ 19:39), Max: 97.8 (09-29-18 @ 12:20)  HR: 75 (09-29-18 @ 19:39) (75 - 82)  BP: 111/52 (09-29-18 @ 19:39) (110/60 - 119/66)  BP(mean): --  RR: 16 (09-29-18 @ 19:39) (16 - 17)  SpO2: 100% (09-29-18 @ 19:39) (100% - 100%)  Neck: No palpable thyroid nodules.  CVS: S1S2, No murmurs  Respiratory: No wheezing, no crepitations  GI: Abdomen soft, bowel sounds positive  Musculoskeletal:  edema lower extremities.   Skin: No skin rashes, no ecchymosis    Diagnostics
Chief complaint  Patient is a 72y old  Female who presents with a chief complaint of SOB, ZEE and Midsternal chest heaviness (30 Sep 2018 12:21)   Review of systems  Patient in bed, looks comfortable, no fever, no hypoglycemia.    Labs and Fingersticks  CAPILLARY BLOOD GLUCOSE      POCT Blood Glucose.: 267 mg/dL (30 Sep 2018 17:19)  POCT Blood Glucose.: 280 mg/dL (30 Sep 2018 12:48)  POCT Blood Glucose.: 216 mg/dL (30 Sep 2018 08:48)  POCT Blood Glucose.: 422 mg/dL (29 Sep 2018 21:49)          Calcium, Total Serum: 9.6 (09-30 @ 07:34)  Calcium, Total Serum: 10.1 (09-29 @ 05:43)          09-30    138  |  97<L>  |  75<H>  ----------------------------<  212<H>  3.6   |  24  |  1.96<H>    Ca    9.6      30 Sep 2018 07:34  Mg     2.4     09-30                          12.2   10.29 )-----------( 295      ( 30 Sep 2018 07:34 )             37.3     Medications  MEDICATIONS  (STANDING):  aspirin enteric coated 81 milliGRAM(s) Oral daily  atorvastatin 40 milliGRAM(s) Oral at bedtime  dextrose 5%. 1000 milliLiter(s) (50 mL/Hr) IV Continuous <Continuous>  dextrose 50% Injectable 12.5 Gram(s) IV Push once  dextrose 50% Injectable 25 Gram(s) IV Push once  dextrose 50% Injectable 25 Gram(s) IV Push once  docusate sodium 100 milliGRAM(s) Oral three times a day  ferrous    sulfate 325 milliGRAM(s) Oral daily  furosemide    Tablet 40 milliGRAM(s) Oral two times a day  insulin glargine Injectable (LANTUS) 30 Unit(s) SubCutaneous at bedtime  insulin lispro (HumaLOG) corrective regimen sliding scale   SubCutaneous three times a day before meals  insulin lispro (HumaLOG) corrective regimen sliding scale   SubCutaneous at bedtime  insulin lispro Injectable (HumaLOG) 14 Unit(s) SubCutaneous three times a day before meals  levothyroxine 50 MICROGram(s) Oral daily  metoprolol tartrate 12.5 milliGRAM(s) Oral two times a day  montelukast 10 milliGRAM(s) Oral at bedtime  multivitamin 1 Tablet(s) Oral daily  senna 2 Tablet(s) Oral at bedtime  ticagrelor 90 milliGRAM(s) Oral two times a day      Physical Exam  General: Patient comfortable in bed  Vital Signs Last 12 Hrs  T(F): 98 (09-30-18 @ 13:07), Max: 98 (09-30-18 @ 13:07)  HR: 72 (09-30-18 @ 13:07) (72 - 76)  BP: 113/64 (09-30-18 @ 13:07) (113/64 - 119/62)  BP(mean): --  RR: 17 (09-30-18 @ 13:07) (16 - 17)  SpO2: 100% (09-30-18 @ 13:07) (100% - 100%)  Neck: No palpable thyroid nodules.  CVS: S1S2, No murmurs  Respiratory: No wheezing, no crepitations  GI: Abdomen soft, bowel sounds positive  Musculoskeletal:  edema lower extremities.   Skin: No skin rashes, no ecchymosis    Diagnostics
[facility] [place] [facility]   MD RAHEEL SHAH MD ANGELA WONG, PA    TEL:  OFFICE: [phone]  DR SANTOYO CELL: [phone]  DR. NGUYEN CELL: [phone]  UMM KENDALL CELL: [phone]        Patient is a 72y old  Female who presents with a chief complaint of SOB, ZEE and Midsternal chest heaviness (29 Sep 2018 11:46)      Patient seen and examined at bedside. No chest pain/sob, c/o weakness    VITALS:  T(F): 97.5 (09-29-18 @ 05:30), Max: 97.7 (09-28-18 @ 19:30)  HR: 76 (09-29-18 @ 05:30)  BP: 114/57 (09-29-18 @ 05:30)  RR: 18 (09-29-18 @ 05:30)  SpO2: 100% (09-29-18 @ 05:30)  Wt(kg): --    09-28 @ 07:01  -  09-29 @ 07:00  --------------------------------------------------------  IN: 1326 mL / OUT: 1550 mL / NET: -224 mL    09-29 @ 07:01  -  09-29 @ 11:55  --------------------------------------------------------  IN: 240 mL / OUT: 150 mL / NET: 90 mL          PHYSICAL EXAM:  Constitutional: NAD  Neck: No JVD  Respiratory: CTAB, no wheezes, rales or rhonchi  Cardiovascular: S1, S2, RRR  Gastrointestinal: BS+, soft, NT/ND  Extremities: No peripheral edema    Hospital Medications:   MEDICATIONS  (STANDING):  aspirin enteric coated 81 milliGRAM(s) Oral daily  atorvastatin 40 milliGRAM(s) Oral at bedtime  dextrose 5%. 1000 milliLiter(s) (50 mL/Hr) IV Continuous <Continuous>  dextrose 50% Injectable 12.5 Gram(s) IV Push once  dextrose 50% Injectable 25 Gram(s) IV Push once  dextrose 50% Injectable 25 Gram(s) IV Push once  docusate sodium 100 milliGRAM(s) Oral three times a day  ferrous    sulfate 325 milliGRAM(s) Oral daily  furosemide    Tablet 40 milliGRAM(s) Oral two times a day  insulin glargine Injectable (LANTUS) 30 Unit(s) SubCutaneous at bedtime  insulin lispro (HumaLOG) corrective regimen sliding scale   SubCutaneous three times a day before meals  insulin lispro (HumaLOG) corrective regimen sliding scale   SubCutaneous at bedtime  insulin lispro Injectable (HumaLOG) 12 Unit(s) SubCutaneous three times a day before meals  levothyroxine 50 MICROGram(s) Oral daily  metoprolol tartrate 12.5 milliGRAM(s) Oral two times a day  montelukast 10 milliGRAM(s) Oral at bedtime  multivitamin 1 Tablet(s) Oral daily  senna 2 Tablet(s) Oral at bedtime  ticagrelor 90 milliGRAM(s) Oral two times a day      LABS:  09-29    139  |  95<L>  |  72<H>  ----------------------------<  173<H>  3.4<L>   |  24  |  1.96<H>    Ca    10.1      29 Sep 2018 05:43  Mg     2.3     09-29      Creatinine Trend: 1.96 <--, 1.96 <--, 1.90 <--, 1.76 <--, 1.65 <--, 1.59 <--                                11.9   10.38 )-----------( 295      ( 29 Sep 2018 05:43 )             35.8     Urine Studies:  Urinalysis - [09-01-18 @ 04:29]      Color YELLOW / Appearance CLEAR / SG 1.015 / pH 6.5      Gluc 250 / Ketone NEGATIVE  / Bili NEGATIVE / Urobili NORMAL       Blood MODERATE / Protein MODERATE / Leuk Est NEGATIVE / Nitrite NEGATIVE      RBC 5-10 / WBC 2-5 / Hyaline  / Gran  / Sq Epi OCC. / Non Sq Epi  / Bacteria       Iron 40, TIBC 377, %sat --      [01-20-18 @ 06:06]  Ferritin 38.35      [01-20-18 @ 06:06]  PTH 43.55 (Ca --)      [09-10-18 @ 07:15]   --  PTH 36.60 (Ca --)      [09-08-18 @ 03:15]   --  Vitamin D (25OH) 15.8      [09-08-18 @ 03:15]  HbA1c 7.8      [09-25-18 @ 04:20]  TSH 2.03      [09-25-18 @ 04:20]  Lipid: chol 132, , HDL 31, LDL 78      [09-25-18 @ 04:20]        RADIOLOGY & ADDITIONAL STUDIES:
[facility] [place] [facility] [place] [facility]   MD RAHEEL SHAH MD ANGELA WONG, PA    TEL:  OFFICE: [phone]  DR SANTOYO CELL: [phone]  DR. NGUYEN CELL: [phone]  UMM KENDALL CELL: [phone]        Patient is a 72y old  Female who presents with a chief complaint of SOB, ZEE and Midsternal chest heaviness (01 Oct 2018 15:37)      Patient seen and examined at bedside. No chest pain/sob    VITALS:  T(F): 97.4 (10-02-18 @ 12:26), Max: 98.6 (10-02-18 @ 05:00)  HR: 72 (10-02-18 @ 12:26)  BP: 112/64 (10-02-18 @ 12:26)  RR: 16 (10-02-18 @ 12:26)  SpO2: 100% (10-02-18 @ 12:26)  Wt(kg): --    10-01 @ 07:01  -  10-02 @ 07:00  --------------------------------------------------------  IN: 730 mL / OUT: 2100 mL / NET: -1370 mL    10-02 @ 07:01  -  10-02 @ 13:49  --------------------------------------------------------  IN: 240 mL / OUT: 450 mL / NET: -210 mL          PHYSICAL EXAM:  Constitutional: NAD  Neck: No JVD  Respiratory: CTAB, no wheezes, rales or rhonchi  Cardiovascular: S1, S2, RRR  Gastrointestinal: BS+, soft, NT/ND  Extremities: No peripheral edema    Hospital Medications:   MEDICATIONS  (STANDING):  aspirin enteric coated 81 milliGRAM(s) Oral daily  atorvastatin 40 milliGRAM(s) Oral at bedtime  dextrose 5%. 1000 milliLiter(s) (50 mL/Hr) IV Continuous <Continuous>  dextrose 50% Injectable 12.5 Gram(s) IV Push once  dextrose 50% Injectable 25 Gram(s) IV Push once  dextrose 50% Injectable 25 Gram(s) IV Push once  docusate sodium 100 milliGRAM(s) Oral three times a day  ferrous    sulfate 325 milliGRAM(s) Oral daily  insulin glargine Injectable (LANTUS) 35 Unit(s) SubCutaneous at bedtime  insulin lispro (HumaLOG) corrective regimen sliding scale   SubCutaneous three times a day before meals  insulin lispro (HumaLOG) corrective regimen sliding scale   SubCutaneous at bedtime  insulin lispro Injectable (HumaLOG) 16 Unit(s) SubCutaneous three times a day before meals  levothyroxine 50 MICROGram(s) Oral daily  metoprolol tartrate 12.5 milliGRAM(s) Oral two times a day  montelukast 10 milliGRAM(s) Oral at bedtime  multivitamin 1 Tablet(s) Oral daily  senna 2 Tablet(s) Oral at bedtime  ticagrelor 90 milliGRAM(s) Oral two times a day      LABS:  10-02    137  |  99  |  74<H>  ----------------------------<  231<H>  3.5   |  21<L>  |  1.97<H>    Ca    8.7      02 Oct 2018 06:15  Mg     2.6     10-02      Creatinine Trend: 1.97 <--, 2.07 <--, 1.96 <--, 1.96 <--, 1.96 <--, 1.90 <--, 1.76 <--                                11.6   9.94  )-----------( 292      ( 02 Oct 2018 06:15 )             36.0     Urine Studies:      Iron 40, TIBC 377, %sat --      [01-20-18 @ 06:06]  Ferritin 38.35      [01-20-18 @ 06:06]  PTH 43.55 (Ca --)      [09-10-18 @ 07:15]   --  PTH 36.60 (Ca --)      [09-08-18 @ 03:15]   --  Vitamin D (25OH) 15.8      [09-08-18 @ 03:15]  HbA1c 7.8      [09-25-18 @ 04:20]  TSH 2.03      [09-25-18 @ 04:20]  Lipid: chol 132, , HDL 31, LDL 78      [09-25-18 @ 04:20]        RADIOLOGY & ADDITIONAL STUDIES:
[facility] [place] [facility]   MD RAHEEL SHAH MD ANGELA WONG, PA    TEL:  OFFICE: [phone]  DR SANTOYO CELL: [phone]  DR. NGUYEN CELL: [phone]  UMM KENDALL CELL: [phone]        Patient is a 72y old  Female who presents with a chief complaint of SOB, ZEE and Midsternal chest heaviness (03 Oct 2018 13:07)      Patient seen and examined at bedside. No chest pain/sob    VITALS:  T(F): 97.5 (10-03-18 @ 12:11), Max: 98 (10-02-18 @ 21:04)  HR: 68 (10-03-18 @ 12:11)  BP: 127/63 (10-03-18 @ 12:11)  RR: 17 (10-03-18 @ 12:11)  SpO2: 100% (10-03-18 @ 12:11)  Wt(kg): --    10-02 @ 07:01  -  10-03 @ 07:00  --------------------------------------------------------  IN: 810 mL / OUT: 1050 mL / NET: -240 mL    10-03 @ 07:01  -  10-03 @ 14:41  --------------------------------------------------------  IN: 170 mL / OUT: 400 mL / NET: -230 mL          PHYSICAL EXAM:  Constitutional: NAD  Neck: No JVD  Respiratory: CTAB, no wheezes, rales or rhonchi  Cardiovascular: S1, S2, RRR  Gastrointestinal: BS+, soft, NT/ND  Extremities: No peripheral edema    Hospital Medications:   MEDICATIONS  (STANDING):  aspirin enteric coated 81 milliGRAM(s) Oral daily  atorvastatin 40 milliGRAM(s) Oral at bedtime  dextrose 5%. 1000 milliLiter(s) (50 mL/Hr) IV Continuous <Continuous>  dextrose 50% Injectable 12.5 Gram(s) IV Push once  dextrose 50% Injectable 25 Gram(s) IV Push once  dextrose 50% Injectable 25 Gram(s) IV Push once  docusate sodium 100 milliGRAM(s) Oral three times a day  ferrous    sulfate 325 milliGRAM(s) Oral daily  furosemide    Tablet 40 milliGRAM(s) Oral two times a day  insulin glargine Injectable (LANTUS) 35 Unit(s) SubCutaneous at bedtime  insulin lispro (HumaLOG) corrective regimen sliding scale   SubCutaneous three times a day before meals  insulin lispro (HumaLOG) corrective regimen sliding scale   SubCutaneous at bedtime  insulin lispro Injectable (HumaLOG) 16 Unit(s) SubCutaneous three times a day before meals  levothyroxine 50 MICROGram(s) Oral daily  metoprolol tartrate 12.5 milliGRAM(s) Oral two times a day  montelukast 10 milliGRAM(s) Oral at bedtime  multivitamin 1 Tablet(s) Oral daily  senna 2 Tablet(s) Oral at bedtime  ticagrelor 90 milliGRAM(s) Oral two times a day      LABS:  10-03    142  |  105  |  57<H>  ----------------------------<  110<H>  3.5   |  21<L>  |  1.59<H>    Ca    9.1      03 Oct 2018 06:19  Mg     2.7     10-03      Creatinine Trend: 1.59 <--, 1.97 <--, 2.07 <--, 1.96 <--, 1.96 <--, 1.96 <--, 1.90 <--                                11.2   9.92  )-----------( 295      ( 03 Oct 2018 07:19 )             34.5     Urine Studies:      Iron 40, TIBC 377, %sat --      [01-20-18 @ 06:06]  Ferritin 38.35      [01-20-18 @ 06:06]  PTH 43.55 (Ca --)      [09-10-18 @ 07:15]   --  PTH 36.60 (Ca --)      [09-08-18 @ 03:15]   --  Vitamin D (25OH) 15.8      [09-08-18 @ 03:15]  HbA1c 7.8      [09-25-18 @ 04:20]  TSH 2.03      [09-25-18 @ 04:20]  Lipid: chol 132, , HDL 31, LDL 78      [09-25-18 @ 04:20]        RADIOLOGY & ADDITIONAL STUDIES:
[facility] [place] [facility]   MD RAHEEL SHAH MD ANGELA WONG, PA    TEL:  OFFICE: [phone]  DR SANTOYO CELL: [phone]  DR. NGUYEN CELL: [phone]  UMM KENDALL CELL: [phone]        Patient is a 72y old  Female who presents with a chief complaint of SOB, ZEE and Midsternal chest heaviness (28 Sep 2018 10:44)      Patient seen and examined at bedside. No chest pain/sob. stating SOB much improved but is feeling very weak     VITALS:  T(F): 97.2 (09-28-18 @ 05:11), Max: 97.3 (09-27-18 @ 19:42)  HR: 78 (09-28-18 @ 05:11)  BP: 134/80 (09-28-18 @ 05:11)  RR: 17 (09-28-18 @ 05:11)  SpO2: 98% (09-28-18 @ 05:11)  Wt(kg): --    09-27 @ 07:01  -  09-28 @ 07:00  --------------------------------------------------------  IN: 500 mL / OUT: 1000 mL / NET: -500 mL    09-28 @ 07:01  -  09-28 @ 11:28  --------------------------------------------------------  IN: 590 mL / OUT: 400 mL / NET: 190 mL          PHYSICAL EXAM:  Constitutional: NAD  Neck: No JVD  Respiratory: CTAB, no wheezes, rales or rhonchi  Cardiovascular: S1, S2, RRR  Gastrointestinal: BS+, soft, NT/ND  Extremities: No peripheral edema    Hospital Medications:   MEDICATIONS  (STANDING):  aspirin enteric coated 81 milliGRAM(s) Oral daily  atorvastatin 40 milliGRAM(s) Oral at bedtime  dextrose 5%. 1000 milliLiter(s) (50 mL/Hr) IV Continuous <Continuous>  dextrose 50% Injectable 12.5 Gram(s) IV Push once  dextrose 50% Injectable 25 Gram(s) IV Push once  dextrose 50% Injectable 25 Gram(s) IV Push once  ferrous    sulfate 325 milliGRAM(s) Oral daily  insulin glargine Injectable (LANTUS) 22 Unit(s) SubCutaneous at bedtime  insulin lispro (HumaLOG) corrective regimen sliding scale   SubCutaneous three times a day before meals  insulin lispro (HumaLOG) corrective regimen sliding scale   SubCutaneous at bedtime  insulin lispro Injectable (HumaLOG) 10 Unit(s) SubCutaneous three times a day before meals  levothyroxine 50 MICROGram(s) Oral daily  metoprolol tartrate 12.5 milliGRAM(s) Oral two times a day  montelukast 10 milliGRAM(s) Oral at bedtime  multivitamin 1 Tablet(s) Oral daily  sodium bicarbonate 650 milliGRAM(s) Oral three times a day  ticagrelor 90 milliGRAM(s) Oral two times a day      LABS:  09-28    137  |  96<L>  |  66<H>  ----------------------------<  261<H>  3.9   |  22  |  1.96<H>    Ca    10.4      28 Sep 2018 04:55  Mg     2.3     09-27      Creatinine Trend: 1.96 <--, 1.90 <--, 1.76 <--, 1.65 <--, 1.59 <--                                11.7   9.78  )-----------( 293      ( 28 Sep 2018 04:55 )             35.7     Urine Studies:  Urinalysis - [09-01-18 @ 04:29]      Color YELLOW / Appearance CLEAR / SG 1.015 / pH 6.5      Gluc 250 / Ketone NEGATIVE  / Bili NEGATIVE / Urobili NORMAL       Blood MODERATE / Protein MODERATE / Leuk Est NEGATIVE / Nitrite NEGATIVE      RBC 5-10 / WBC 2-5 / Hyaline  / Gran  / Sq Epi OCC. / Non Sq Epi  / Bacteria       Iron 40, TIBC 377, %sat --      [01-20-18 @ 06:06]  Ferritin 38.35      [01-20-18 @ 06:06]  PTH 43.55 (Ca --)      [09-10-18 @ 07:15]   --  PTH 36.60 (Ca --)      [09-08-18 @ 03:15]   --  Vitamin D (25OH) 15.8      [09-08-18 @ 03:15]  HbA1c 7.8      [09-25-18 @ 04:20]  TSH 2.03      [09-25-18 @ 04:20]  Lipid: chol 132, , HDL 31, LDL 78      [09-25-18 @ 04:20]        RADIOLOGY & ADDITIONAL STUDIES:
[facility] [place] [facility]   MD RAHEEL SHAH MD ANGELA WONG, PA    TEL:  OFFICE: [phone]  DR SANTOYO CELL: [phone]  DR. NGUYEN CELL: [phone]  UMM KENDALL CELL: [phone]        Patient is a 72y old  Female who presents with a chief complaint of SOB, ZEE and Midsternal chest heaviness (30 Sep 2018 17:37)      Patient seen and examined at bedside. No chest pain/sob    VITALS:  T(F): 97.5 (10-01-18 @ 12:15), Max: 98 (09-30-18 @ 13:07)  HR: 76 (10-01-18 @ 12:15)  BP: 118/62 (10-01-18 @ 12:15)  RR: 16 (10-01-18 @ 12:15)  SpO2: 100% (10-01-18 @ 12:15)  Wt(kg): --    09-30 @ 07:01  -  10-01 @ 07:00  --------------------------------------------------------  IN: 230 mL / OUT: 200 mL / NET: 30 mL    10-01 @ 07:01  -  10-01 @ 12:32  --------------------------------------------------------  IN: 210 mL / OUT: 600 mL / NET: -390 mL          PHYSICAL EXAM:  Constitutional: NAD  Neck: No JVD  Respiratory: CTAB, no wheezes, rales or rhonchi  Cardiovascular: S1, S2, RRR  Gastrointestinal: BS+, soft, NT/ND  Extremities: No peripheral edema    Hospital Medications:   MEDICATIONS  (STANDING):  aspirin enteric coated 81 milliGRAM(s) Oral daily  atorvastatin 40 milliGRAM(s) Oral at bedtime  dextrose 5%. 1000 milliLiter(s) (50 mL/Hr) IV Continuous <Continuous>  dextrose 50% Injectable 12.5 Gram(s) IV Push once  dextrose 50% Injectable 25 Gram(s) IV Push once  dextrose 50% Injectable 25 Gram(s) IV Push once  docusate sodium 100 milliGRAM(s) Oral three times a day  ferrous    sulfate 325 milliGRAM(s) Oral daily  furosemide    Tablet 40 milliGRAM(s) Oral two times a day  insulin glargine Injectable (LANTUS) 35 Unit(s) SubCutaneous at bedtime  insulin lispro (HumaLOG) corrective regimen sliding scale   SubCutaneous three times a day before meals  insulin lispro (HumaLOG) corrective regimen sliding scale   SubCutaneous at bedtime  insulin lispro Injectable (HumaLOG) 16 Unit(s) SubCutaneous three times a day before meals  levothyroxine 50 MICROGram(s) Oral daily  metoprolol tartrate 12.5 milliGRAM(s) Oral two times a day  montelukast 10 milliGRAM(s) Oral at bedtime  multivitamin 1 Tablet(s) Oral daily  senna 2 Tablet(s) Oral at bedtime  ticagrelor 90 milliGRAM(s) Oral two times a day      LABS:  10-01    139  |  98  |  80<H>  ----------------------------<  227<H>  3.7   |  22  |  2.07<H>    Ca    9.5      01 Oct 2018 05:50  Mg     2.6     10-01      Creatinine Trend: 2.07 <--, 1.96 <--, 1.96 <--, 1.96 <--, 1.90 <--, 1.76 <--, 1.65 <--, 1.59 <--                                11.9   10.15 )-----------( 316      ( 01 Oct 2018 05:50 )             36.4     Urine Studies:      Iron 40, TIBC 377, %sat --      [01-20-18 @ 06:06]  Ferritin 38.35      [01-20-18 @ 06:06]  PTH 43.55 (Ca --)      [09-10-18 @ 07:15]   --  PTH 36.60 (Ca --)      [09-08-18 @ 03:15]   --  Vitamin D (25OH) 15.8      [09-08-18 @ 03:15]  HbA1c 7.8      [09-25-18 @ 04:20]  TSH 2.03      [09-25-18 @ 04:20]  Lipid: chol 132, , HDL 31, LDL 78      [09-25-18 @ 04:20]        RADIOLOGY & ADDITIONAL STUDIES:
[facility] – [place] [facility]   MD RAHEEL SHAH MD RUORU WONG, PA    TEL:  OFFICE: [phone]  DR SANTOYO CELL: [phone]  JUSTEN KENDALL CELL: [phone]  DR. NGUYEN CELL: [phone]    RENAL FOLLOW UP NOTE  --------------------------------------------------------------------------------  HPI:    Pt seen and examined at bedside.   Reports improved  SOB, no chest pain     PAST HISTORY  --------------------------------------------------------------------------------  No significant changes to PMH, PSH, FHx, SHx, unless otherwise noted    ALLERGIES & MEDICATIONS  --------------------------------------------------------------------------------  Allergies    No Known Allergies    Intolerances      Standing Inpatient Medications  aspirin enteric coated 81 milliGRAM(s) Oral daily  atorvastatin 40 milliGRAM(s) Oral at bedtime  dextrose 5%. 1000 milliLiter(s) IV Continuous <Continuous>  dextrose 50% Injectable 12.5 Gram(s) IV Push once  dextrose 50% Injectable 25 Gram(s) IV Push once  dextrose 50% Injectable 25 Gram(s) IV Push once  ferrous    sulfate 325 milliGRAM(s) Oral daily  furosemide   Injectable 60 milliGRAM(s) IV Push two times a day  insulin lispro (HumaLOG) corrective regimen sliding scale   SubCutaneous three times a day before meals  insulin lispro (HumaLOG) corrective regimen sliding scale   SubCutaneous at bedtime  levothyroxine 50 MICROGram(s) Oral daily  metoprolol tartrate 12.5 milliGRAM(s) Oral two times a day  montelukast 10 milliGRAM(s) Oral at bedtime  multivitamin 1 Tablet(s) Oral daily  sodium bicarbonate 650 milliGRAM(s) Oral three times a day  ticagrelor 90 milliGRAM(s) Oral two times a day    PRN Inpatient Medications  dextrose 40% Gel 15 Gram(s) Oral once PRN  glucagon  Injectable 1 milliGRAM(s) IntraMuscular once PRN      REVIEW OF SYSTEMS  --------------------------------------------------------------------------------  General: no fever  CVS: no chest pain  RESP: improved  sob, no cough  ABD: no abdominal pain  : no dysuria,  MSK: no edema     VITALS/PHYSICAL EXAM  --------------------------------------------------------------------------------  T(C): 36.4 (09-26-18 @ 12:04), Max: 36.4 (09-26-18 @ 00:24)  HR: 67 (09-26-18 @ 12:04) (67 - 74)  BP: 121/70 (09-26-18 @ 12:04) (98/54 - 121/70)  RR: 18 (09-26-18 @ 12:04) (18 - 19)  SpO2: 100% (09-26-18 @ 12:04) (100% - 100%)  Wt(kg): --  Height (cm): 152.4 (09-25-18 @ 20:24)  Weight (kg): 56.8 (09-25-18 @ 01:04)  BMI (kg/m2): 24.5 (09-25-18 @ 20:24)  BSA (m2): 1.53 (09-25-18 @ 20:24)      09-25-18 @ 07:01  -  09-26-18 @ 07:00  --------------------------------------------------------  IN: 260 mL / OUT: 200 mL / NET: 60 mL      Physical Exam:  	Gen: NAD  	HEENT: MMM  	Pulm: CTA B/L  	CV: S1S2  	Abd: Soft, +BS  	Ext: No LE edema B/L                      Neuro: Awake   	Skin: Warm and Dry   	    LABS/STUDIES  --------------------------------------------------------------------------------              11.0   9.60  >-----------<  275      [09-26-18 @ 04:00]              34.6     140  |  102  |  37  ----------------------------<  188      [09-26-18 @ 06:00]  3.9   |  23  |  1.76        Ca     9.4     [09-26-18 @ 06:00]      Mg     2.3     [09-26-18 @ 06:00]      Phos  3.2     [09-26-18 @ 06:00]    TPro  7.6  /  Alb  3.9  /  TBili  0.6  /  DBili  x   /  AST  26  /  ALT  15  /  AlkPhos  69  [09-26-18 @ 06:00]    PT/INR: PT 12.0 , INR 1.08       [09-25-18 @ 03:00]  PTT: 59.2       [09-26-18 @ 07:47]          [09-25-18 @ 10:30]    Creatinine Trend:  SCr 1.76 [09-26 @ 06:00]  SCr 1.65 [09-25 @ 04:20]  SCr 1.59 [09-25 @ 01:50]  SCr 1.62 [09-10 @ 07:15]  SCr 1.76 [09-09 @ 04:20]    Urinalysis - [09-01-18 @ 04:29]      Color YELLOW / Appearance CLEAR / SG 1.015 / pH 6.5      Gluc 250 / Ketone NEGATIVE  / Bili NEGATIVE / Urobili NORMAL       Blood MODERATE / Protein MODERATE / Leuk Est NEGATIVE / Nitrite NEGATIVE      RBC 5-10 / WBC 2-5 / Hyaline  / Gran  / Sq Epi OCC. / Non Sq Epi  / Bacteria       Iron 40, TIBC 377, %sat --      [01-20-18 @ 06:06]  Ferritin 38.35      [01-20-18 @ 06:06]  PTH 43.55 (Ca --)      [09-10-18 @ 07:15]   --  PTH 36.60 (Ca --)      [09-08-18 @ 03:15]   --  Vitamin D (25OH) 15.8      [09-08-18 @ 03:15]  HbA1c 7.8      [09-25-18 @ 04:20]  TSH 2.03      [09-25-18 @ 04:20]  Lipid: chol 132, , HDL 31, LDL 78      [09-25-18 @ 04:20]
Patient seen and examined at bedside. No chest pain/sob    VITALS:  T(F): 97.5 (09-30-18 @ 05:40), Max: 97.7 (09-29-18 @ 19:39)  HR: 76 (09-30-18 @ 05:40)  BP: 119/62 (09-30-18 @ 05:40)  RR: 16 (09-30-18 @ 05:40)  SpO2: 100% (09-30-18 @ 05:40)  Wt(kg): --    09-29 @ 07:01  -  09-30 @ 07:00  --------------------------------------------------------  IN: 928 mL / OUT: 880 mL / NET: 48 mL          PHYSICAL EXAM:  Constitutional: NAD  Neck: No JVD  Respiratory: CTAB, no wheezes, rales or rhonchi  Cardiovascular: S1, S2, RRR  Gastrointestinal: BS+, soft, NT/ND  Extremities: No peripheral edema    Hospital Medications:   MEDICATIONS  (STANDING):  aspirin enteric coated 81 milliGRAM(s) Oral daily  atorvastatin 40 milliGRAM(s) Oral at bedtime  dextrose 5%. 1000 milliLiter(s) (50 mL/Hr) IV Continuous <Continuous>  dextrose 50% Injectable 12.5 Gram(s) IV Push once  dextrose 50% Injectable 25 Gram(s) IV Push once  dextrose 50% Injectable 25 Gram(s) IV Push once  docusate sodium 100 milliGRAM(s) Oral three times a day  ferrous    sulfate 325 milliGRAM(s) Oral daily  furosemide    Tablet 40 milliGRAM(s) Oral two times a day  insulin glargine Injectable (LANTUS) 30 Unit(s) SubCutaneous at bedtime  insulin lispro (HumaLOG) corrective regimen sliding scale   SubCutaneous three times a day before meals  insulin lispro (HumaLOG) corrective regimen sliding scale   SubCutaneous at bedtime  insulin lispro Injectable (HumaLOG) 12 Unit(s) SubCutaneous three times a day before meals  levothyroxine 50 MICROGram(s) Oral daily  metoprolol tartrate 12.5 milliGRAM(s) Oral two times a day  montelukast 10 milliGRAM(s) Oral at bedtime  multivitamin 1 Tablet(s) Oral daily  senna 2 Tablet(s) Oral at bedtime  ticagrelor 90 milliGRAM(s) Oral two times a day      LABS:  09-30    138  |  97<L>  |  75<H>  ----------------------------<  212<H>  3.6   |  24  |  1.96<H>    Ca    9.6      30 Sep 2018 07:34  Mg     2.4     09-30      Creatinine Trend: 1.96 <--, 1.96 <--, 1.96 <--, 1.90 <--, 1.76 <--, 1.65 <--, 1.59 <--                              12.2   10.29 )-----------( 295      ( 30 Sep 2018 07:34 )             37.3     Urine Studies:      Urinalysis - [09-01-18 @ 04:29]      Color YELLOW / Appearance CLEAR / SG 1.015 / pH 6.5      Gluc 250 / Ketone NEGATIVE  / Bili NEGATIVE / Urobili NORMAL       Blood MODERATE / Protein MODERATE / Leuk Est NEGATIVE / Nitrite NEGATIVE      RBC 5-10 / WBC 2-5 / Hyaline  / Gran  / Sq Epi OCC. / Non Sq Epi  / Bacteria       Iron 40, TIBC 377, %sat --      [01-20-18 @ 06:06]  Ferritin 38.35      [01-20-18 @ 06:06]  PTH 43.55 (Ca --)      [09-10-18 @ 07:15]   --  PTH 36.60 (Ca --)      [09-08-18 @ 03:15]   --  Vitamin D (25OH) 15.8      [09-08-18 @ 03:15]  HbA1c 7.8      [09-25-18 @ 04:20]  TSH 2.03      [09-25-18 @ 04:20]  Lipid: chol 132, , HDL 31, LDL 78      [09-25-18 @ 04:20]        RADIOLOGY & ADDITIONAL STUDIES:
Patient with no anginal chest pain  Dyspnea improved   ROS otherwise negative     TELE: NSR 1AVB 60s    MEDICATIONS  (STANDING):  aspirin enteric coated 81 milliGRAM(s) Oral daily  atorvastatin 40 milliGRAM(s) Oral at bedtime  ferrous    sulfate 325 milliGRAM(s) Oral daily  furosemide   Injectable 60 milliGRAM(s) IV Push two times a day  insulin lispro (HumaLOG) corrective regimen sliding scale   SubCutaneous three times a day before meals  insulin lispro (HumaLOG) corrective regimen sliding scale   SubCutaneous at bedtime  levothyroxine 50 MICROGram(s) Oral daily  metoprolol tartrate 12.5 milliGRAM(s) Oral two times a day  montelukast 10 milliGRAM(s) Oral at bedtime  multivitamin 1 Tablet(s) Oral daily  sodium bicarbonate 650 milliGRAM(s) Oral three times a day  ticagrelor 90 milliGRAM(s) Oral two times a day    MEDICATIONS  (PRN):  dextrose 40% Gel 15 Gram(s) Oral once PRN Blood Glucose LESS THAN 70 milliGRAM(s)/deciliter  glucagon  Injectable 1 milliGRAM(s) IntraMuscular once PRN Glucose LESS THAN 70 milligrams/deciliter      LABS:                        11.5   10.09 )-----------( 284      ( 27 Sep 2018 06:23 )             35.3     Hemoglobin: 11.5 g/dL (09-27 @ 06:23)  Hemoglobin: 11.0 g/dL (09-26 @ 04:00)  Hemoglobin: 11.2 g/dL (09-26 @ 01:40)  Hemoglobin: 10.3 g/dL (09-25 @ 04:20)  Hemoglobin: 11.6 g/dL (09-25 @ 01:50)    09-27    140  |  98  |  52<H>  ----------------------------<  237<H>  4.1   |  22  |  1.90<H>    Ca    10.5      27 Sep 2018 06:23  Phos  3.2     09-26  Mg     2.3     09-27    TPro  7.6  /  Alb  3.9  /  TBili  0.6  /  DBili  x   /  AST  26  /  ALT  15  /  AlkPhos  69  09-26    Creatinine Trend: 1.90<--, 1.76<--, 1.65<--, 1.59<--, 1.62<--, 1.76<--   PTT - ( 27 Sep 2018 06:23 )  PTT:38.0 SEC  CARDIAC MARKERS ( 25 Sep 2018 10:30 )  x     / x     / 119 u/L / 6.21 ng/mL / x      CARDIAC MARKERS ( 25 Sep 2018 04:20 )  x     / x     / 146 u/L / 7.15 ng/mL / x      CARDIAC MARKERS ( 25 Sep 2018 01:50 )  x     / x     / 170 u/L / 7.97 ng/mL / x            PHYSICAL EXAM  Vital Signs Last 24 Hrs  T(C): 36.4 (27 Sep 2018 05:36), Max: 36.4 (26 Sep 2018 12:04)  T(F): 97.6 (27 Sep 2018 05:36), Max: 97.6 (26 Sep 2018 12:04)  HR: 70 (27 Sep 2018 05:36) (67 - 83)  BP: 122/69 (27 Sep 2018 05:36) (121/70 - 130/64)  BP(mean): --  RR: 18 (27 Sep 2018 05:36) (18 - 19)  SpO2: 100% (27 Sep 2018 05:36) (98% - 100%)      Cardiovascular:  S1S2 RRR, JVP 16  Respiratory: Bibasilar crackles  Gastrointestinal: Abdomen soft, ND, NT, +BS  Skin: Warm, dry, intact. No rash.  Musculoskeletal: Normal ROM, normal strength  Ext: No C/C/E B/L LE    DIAGNOSTIC DATA  TELEMETRY: NSR    CARDIAC MARKERS ( 25 Sep 2018 10:30 )  x     / x     / 119 u/L / 6.21 ng/mL / x      CARDIAC MARKERS ( 25 Sep 2018 04:20 )  x     / x     / 146 u/L / 7.15 ng/mL / x      CARDIAC MARKERS ( 25 Sep 2018 01:50 )  x     / x     / 170 u/L / 7.97 ng/mL / x          Troponin T, High Sensitivity (09.25.18 @ 10:30)    Troponin T, High Sensitivity: 857  Troponin T, High Sensitivity (09.25.18 @ 04:20)    Troponin T, High Sensitivity: 816  Troponin T, High Sensitivity (09.25.18 @ 01:50)    Troponin T, High Sensitivity: 724      < from: TTE with Doppler (w/Cont) (09.06.18 @ 15:21) >  CONCLUSIONS: EF 37%  1. Mitral annular calcification, otherwise normal mitral  valve. Mild mitral regurgitation.  2. Normal left ventricular internal dimensions and wall  thicknesses.  3. Endocardium not well visualized; grossly moderate to  severe global left ventricular systolic dysfunction.  The  inferolateral wall appears particularly hypokinetic.  Endocardial visualization enhanced with intravenous  injection of echo contrast (Definity).  4. The right ventricle is not well visualized; grossly  normal right ventricular systolic function.    < end of copied text >    < from: Cardiac Cath Lab - Adult (02.06.18 @ 16:26) >  VENTRICLES: No left ventriculogram was performed.  CORONARY VESSELS: The coronary circulation is right dominant.  LM:   --  LM:Angiography showed mild atherosclerosis with no flow limiting  lesions.  LAD:   --  Proximal LAD: There was a diffuse 90 % stenosis at a site with  no prior intervention. The lesion was eccentric. There was a large  vascular territory distal to the lesion and good blood supply to the  distal myocardium from a graft.  CX:   --  Proximal circumflex: There was a diffuse 40 % stenosis at a site  with no prior intervention. The lesion was eccentric. There was PARUL grade  3 flow through the vessel (brisk flow).  RI:   --  Proximal ramus intermedius: There was a diffuse 10 % stenosis at  the site of a prior stent. The lesion was smoothly contoured. There was  PARUL grade 3 flow through the vessel (brisk flow).  RCA:   --  Mid RCA: There was a 100 % stenosis. There was a moderate-sized  vascular territory distal to the lesion and poor collateral blood supply  to the distal myocardium. This lesion is a chronic total occlusion.  GRAFTS:   --  Graft to the LAD: The graft was a LIMA. Graft angiography  showed no degeneration. Distal vessel angiography showed mild diffuse  disease.  COMPLICATIONS: There were no complications. No complications occurred  during the cath lab visit.  DIAGNOSTIC IMPRESSIONS: Patent GOMES to LAD  Patent stent ramus   of RCA , collateralized from LAD  DIAGNOSTIC RECOMMENDATIONS: Medical therapy  INTERVENTIONAL IMPRESSIONS: Patent GOMES to LAD  Patent stent ramus   of RCA , collateralized from LAD  INTERVENTIONAL RECOMMENDATIONS: Medical therapy  Prepared and signed by  Gretchen Guerra M.D.  Signed 02/15/2018 19:03:05    < end of copied text >      < from: US Duplex Venous Lower Ext Ltd, Left (09.26.18 @ 13:05) >  IMPRESSION:     No evidence of left lower extremity deep venous thrombosis.    < end of copied text >    < from: Xray Foot AP + Lateral, Left (09.26.18 @ 14:01) >  IMPRESSION:   No subcutaneous gas, radiopaque foreign bodies, or radiographic evidence   for osteomyelitis.    No fractures or dislocations.    Tarsometatarsal alignment maintained without evidence for a Lisfranc   injury.    Preserved joint spaces and no joint margin erosions.    Small Víctor deformity and calcaneal enthesophytes.    Generalized osteopenia otherwise no discrete lytic or blastic lesions.    Vascular calcifications.    < end of copied text >      ASSESSMENT AND PLAN:    73 y/o Female PMH HTN, mild CKD, Hyperlipidemia, DM-II, CAD s/p 3V CABG  (LIMA to LAD, SVG to OM, SVG to PDA) at LI 2014, s/p CORNELIA TO RI 11/17/17, NSTEMI 12/2017 s/p LHC on 12/5 and CORNELIA to [place], TTE at that time revealed grossly moderate to severe LV dysfunction with hypokinesis of the inferior, lateral and inferolateral walls with an EF of 36% s/p C on 2/6  with no new OBS CAD, managed medically, admitted with chest pain, ZEE, acute on chronic systolic HF    --CONT IV diuresis, much improved but still with rales - f/u renal given mild MERVIN on CKD  --Elevated Trop T with neg CPK and no acute EKG changes, low suspicion for ACS, can cont to trend  --LLE doppler and L foot xrays are unremarkable for DVT or fracture respectively   --PT eval noted - no PT needs   -- Heparin GTT d/shannan 9/26  -- CAD/stents  - c/w asa/brilinta/statin/bb  -- f/u Dr Quick upon DC for cardio
S: no chest pain; sob improved.  Per RN pt with diarrhea this AM      MEDICATIONS  (STANDING):  aspirin enteric coated 81 milliGRAM(s) Oral daily  atorvastatin 40 milliGRAM(s) Oral at bedtime  docusate sodium 100 milliGRAM(s) Oral three times a day  ferrous    sulfate 325 milliGRAM(s) Oral daily  insulin glargine Injectable (LANTUS) 30 Unit(s) SubCutaneous at bedtime  insulin lispro (HumaLOG) corrective regimen sliding scale   SubCutaneous three times a day before meals  insulin lispro (HumaLOG) corrective regimen sliding scale   SubCutaneous at bedtime  insulin lispro Injectable (HumaLOG) 12 Unit(s) SubCutaneous three times a day before meals  levothyroxine 50 MICROGram(s) Oral daily  metoprolol tartrate 12.5 milliGRAM(s) Oral two times a day  montelukast 10 milliGRAM(s) Oral at bedtime  multivitamin 1 Tablet(s) Oral daily  senna 2 Tablet(s) Oral at bedtime  ticagrelor 90 milliGRAM(s) Oral two times a day    MEDICATIONS  (PRN):  dextrose 40% Gel 15 Gram(s) Oral once PRN Blood Glucose LESS THAN 70 milliGRAM(s)/deciliter  glucagon  Injectable 1 milliGRAM(s) IntraMuscular once PRN Glucose LESS THAN 70 milligrams/deciliter    LABS:                        11.9   10.38 )-----------( 295      ( 29 Sep 2018 05:43 )             35.8    139  |  95<L>  |  72<H>  ----------------------------<  173<H>  3.4<L>   |  24  |  1.96<H>    Ca    10.1      29 Sep 2018 05:43  Mg     2.3     09-29    Creatinine Trend: 1.96<--, 1.96<--, 1.90<--, 1.76<--, 1.65<--, 1.59<--     PHYSICAL EXAM  Vital Signs Last 24 Hrs  T(C): 36.4 (29 Sep 2018 05:30), Max: 36.5 (28 Sep 2018 19:30)  T(F): 97.5 (29 Sep 2018 05:30), Max: 97.7 (28 Sep 2018 19:30)  HR: 76 (29 Sep 2018 05:30) (71 - 78)  BP: 114/57 (29 Sep 2018 05:30) (103/47 - 120/48)  RR: 18 (29 Sep 2018 05:30) (18 - 18)  SpO2: 100% (29 Sep 2018 05:30) (97% - 100%)    Cardiovascular:  S1S2 RRR,   Respiratory: grossly CTA BL  Gastrointestinal: Abdomen soft, ND, NT, +BS  Skin: Warm, dry, intact. No rash.  Musculoskeletal: Normal ROM, normal strength  Ext: No C/C/E B/L LE    DIAGNOSTIC DATA  TELEMETRY: NSR    CARDIAC MARKERS ( 25 Sep 2018 10:30 )  x     / x     / 119 u/L / 6.21 ng/mL / x      CARDIAC MARKERS ( 25 Sep 2018 04:20 )  x     / x     / 146 u/L / 7.15 ng/mL / x      CARDIAC MARKERS ( 25 Sep 2018 01:50 )  x     / x     / 170 u/L / 7.97 ng/mL / x        Troponin T, High Sensitivity (09.25.18 @ 10:30)    Troponin T, High Sensitivity: 857  Troponin T, High Sensitivity (09.25.18 @ 04:20)    Troponin T, High Sensitivity: 816  Troponin T, High Sensitivity (09.25.18 @ 01:50)    Troponin T, High Sensitivity: 724      < from: TTE with Doppler (w/Cont) (09.06.18 @ 15:21) >  CONCLUSIONS: EF 37%  1. Mitral annular calcification, otherwise normal mitral  valve. Mild mitral regurgitation.  2. Normal left ventricular internal dimensions and wall  thicknesses.  3. Endocardium not well visualized; grossly moderate to  severe global left ventricular systolic dysfunction.  The  inferolateral wall appears particularly hypokinetic.  Endocardial visualization enhanced with intravenous  injection of echo contrast (Definity).  4. The right ventricle is not well visualized; grossly  normal right ventricular systolic function.    < end of copied text >    < from: Cardiac Cath Lab - Adult (02.06.18 @ 16:26) >  VENTRICLES: No left ventriculogram was performed.  CORONARY VESSELS: The coronary circulation is right dominant.  LM:   --  LM:Angiography showed mild atherosclerosis with no flow limiting  lesions.  LAD:   --  Proximal LAD: There was a diffuse 90 % stenosis at a site with  no prior intervention. The lesion was eccentric. There was a large  vascular territory distal to the lesion and good blood supply to the  distal myocardium from a graft.  CX:   --  Proximal circumflex: There was a diffuse 40 % stenosis at a site  with no prior intervention. The lesion was eccentric. There was PARUL grade  3 flow through the vessel (brisk flow).  RI:   --  Proximal ramus intermedius: There was a diffuse 10 % stenosis at  the site of a prior stent. The lesion was smoothly contoured. There was  PARUL grade 3 flow through the vessel (brisk flow).  RCA:   --  Mid RCA: There was a 100 % stenosis. There was a moderate-sized  vascular territory distal to the lesion and poor collateral blood supply  to the distal myocardium. This lesion is a chronic total occlusion.  GRAFTS:   --  Graft to the LAD: The graft was a LIMA. Graft angiography  showed no degeneration. Distal vessel angiography showed mild diffuse  disease.  COMPLICATIONS: There were no complications. No complications occurred  during the cath lab visit.  DIAGNOSTIC IMPRESSIONS: Patent GOMES to LAD  Patent stent ramus   of RCA , collateralized from LAD  DIAGNOSTIC RECOMMENDATIONS: Medical therapy  INTERVENTIONAL IMPRESSIONS: Patent GOMES to LAD  Patent stent ramus   of RCA , collateralized from LAD  INTERVENTIONAL RECOMMENDATIONS: Medical therapy  Prepared and signed by  Gretchen Guerra M.D.  Signed 02/15/2018 19:03:05    < end of copied text >      < from: US Duplex Venous Lower Ext Ltd, Left (09.26.18 @ 13:05) >  IMPRESSION:     No evidence of left lower extremity deep venous thrombosis.    < end of copied text >    < from: Xray Foot AP + Lateral, Left (09.26.18 @ 14:01) >  IMPRESSION:   No subcutaneous gas, radiopaque foreign bodies, or radiographic evidence   for osteomyelitis.    No fractures or dislocations.    Tarsometatarsal alignment maintained without evidence for a Lisfranc   injury.    Preserved joint spaces and no joint margin erosions.    Small Víctor deformity and calcaneal enthesophytes.    Generalized osteopenia otherwise no discrete lytic or blastic lesions.    Vascular calcifications.    < end of copied text >      ASSESSMENT AND PLAN:    73 y/o Female PMH HTN, mild CKD, Hyperlipidemia, DM-II, CAD s/p 3V CABG  (LIMA to LAD, SVG to OM, SVG to PDA) at LI 2014, s/p DARRIAN TO RI 11/17/17, NSTEMI 12/2017 s/p LHC on 12/5 and DARRIAN to [place], TTE at that time revealed grossly moderate to severe LV dysfunction with hypokinesis of the inferior, lateral and inferolateral walls with an EF of 36% s/p C on 2/6  with no new OBS CAD, managed medically, admitted with chest pain, ZEE, acute on chronic systolic HF       --Volume overload much improved, change to lasix 40 PO BID  --follow up renal   --Elevated Trop T with neg CPK and no acute EKG changes, low suspicion for ACS  --LLE doppler and L foot xrays are unremarkable for DVT or fracture respectively   --continue with dapt for history of darrian  --PT for deconditioning
S: no chest pain; sob slightly improved     aspirin enteric coated 81 milliGRAM(s) Oral daily  atorvastatin 40 milliGRAM(s) Oral at bedtime  dextrose 40% Gel 15 Gram(s) Oral once PRN  dextrose 5%. 1000 milliLiter(s) IV Continuous <Continuous>  dextrose 50% Injectable 12.5 Gram(s) IV Push once  dextrose 50% Injectable 25 Gram(s) IV Push once  dextrose 50% Injectable 25 Gram(s) IV Push once  ferrous    sulfate 325 milliGRAM(s) Oral daily  glucagon  Injectable 1 milliGRAM(s) IntraMuscular once PRN  insulin glargine Injectable (LANTUS) 22 Unit(s) SubCutaneous at bedtime  insulin lispro (HumaLOG) corrective regimen sliding scale   SubCutaneous three times a day before meals  insulin lispro (HumaLOG) corrective regimen sliding scale   SubCutaneous at bedtime  insulin lispro Injectable (HumaLOG) 10 Unit(s) SubCutaneous three times a day before meals  levothyroxine 50 MICROGram(s) Oral daily  metoprolol tartrate 12.5 milliGRAM(s) Oral two times a day  montelukast 10 milliGRAM(s) Oral at bedtime  multivitamin 1 Tablet(s) Oral daily  sodium bicarbonate 650 milliGRAM(s) Oral three times a day  ticagrelor 90 milliGRAM(s) Oral two times a day                            11.7   9.78  )-----------( 293      ( 28 Sep 2018 04:55 )             35.7       09-28    137  |  96<L>  |  66<H>  ----------------------------<  261<H>  3.9   |  22  |  1.96<H>    Ca    10.4      28 Sep 2018 04:55  Mg     2.3     09-27              T(C): 36.2 (09-28-18 @ 05:11), Max: 36.3 (09-27-18 @ 19:42)  HR: 78 (09-28-18 @ 05:11) (70 - 78)  BP: 134/80 (09-28-18 @ 05:11) (124/60 - 134/80)  RR: 17 (09-28-18 @ 05:11) (17 - 17)  SpO2: 98% (09-28-18 @ 05:11) (98% - 100%)  Wt(kg): --    I&O's Summary    27 Sep 2018 07:01  -  28 Sep 2018 07:00  --------------------------------------------------------  IN: 500 mL / OUT: 1000 mL / NET: -500 mL    28 Sep 2018 07:01  -  28 Sep 2018 10:45  --------------------------------------------------------  IN: 0 mL / OUT: 400 mL / NET: -400 mL        Cardiovascular:  S1S2 RRR,   Respiratory: Bibasilar crackles  Gastrointestinal: Abdomen soft, ND, NT, +BS  Skin: Warm, dry, intact. No rash.  Musculoskeletal: Normal ROM, normal strength  Ext: No C/C/E B/L LE    DIAGNOSTIC DATA  TELEMETRY: NSR    CARDIAC MARKERS ( 25 Sep 2018 10:30 )  x     / x     / 119 u/L / 6.21 ng/mL / x      CARDIAC MARKERS ( 25 Sep 2018 04:20 )  x     / x     / 146 u/L / 7.15 ng/mL / x      CARDIAC MARKERS ( 25 Sep 2018 01:50 )  x     / x     / 170 u/L / 7.97 ng/mL / x          Troponin T, High Sensitivity (09.25.18 @ 10:30)    Troponin T, High Sensitivity: 857  Troponin T, High Sensitivity (09.25.18 @ 04:20)    Troponin T, High Sensitivity: 816  Troponin T, High Sensitivity (09.25.18 @ 01:50)    Troponin T, High Sensitivity: 724      < from: TTE with Doppler (w/Cont) (09.06.18 @ 15:21) >  CONCLUSIONS: EF 37%  1. Mitral annular calcification, otherwise normal mitral  valve. Mild mitral regurgitation.  2. Normal left ventricular internal dimensions and wall  thicknesses.  3. Endocardium not well visualized; grossly moderate to  severe global left ventricular systolic dysfunction.  The  inferolateral wall appears particularly hypokinetic.  Endocardial visualization enhanced with intravenous  injection of echo contrast (Definity).  4. The right ventricle is not well visualized; grossly  normal right ventricular systolic function.    < end of copied text >    < from: Cardiac Cath Lab - Adult (02.06.18 @ 16:26) >  VENTRICLES: No left ventriculogram was performed.  CORONARY VESSELS: The coronary circulation is right dominant.  LM:   --  LM:Angiography showed mild atherosclerosis with no flow limiting  lesions.  LAD:   --  Proximal LAD: There was a diffuse 90 % stenosis at a site with  no prior intervention. The lesion was eccentric. There was a large  vascular territory distal to the lesion and good blood supply to the  distal myocardium from a graft.  CX:   --  Proximal circumflex: There was a diffuse 40 % stenosis at a site  with no prior intervention. The lesion was eccentric. There was PARUL grade  3 flow through the vessel (brisk flow).  RI:   --  Proximal ramus intermedius: There was a diffuse 10 % stenosis at  the site of a prior stent. The lesion was smoothly contoured. There was  PARUL grade 3 flow through the vessel (brisk flow).  RCA:   --  Mid RCA: There was a 100 % stenosis. There was a moderate-sized  vascular territory distal to the lesion and poor collateral blood supply  to the distal myocardium. This lesion is a chronic total occlusion.  GRAFTS:   --  Graft to the LAD: The graft was a LIMA. Graft angiography  showed no degeneration. Distal vessel angiography showed mild diffuse  disease.  COMPLICATIONS: There were no complications. No complications occurred  during the cath lab visit.  DIAGNOSTIC IMPRESSIONS: Patent GOMES to LAD  Patent stent ramus   of RCA , collateralized from LAD  DIAGNOSTIC RECOMMENDATIONS: Medical therapy  INTERVENTIONAL IMPRESSIONS: Patent GOMES to LAD  Patent stent ramus   of RCA , collateralized from LAD  INTERVENTIONAL RECOMMENDATIONS: Medical therapy  Prepared and signed by  Gretchen Guerra M.D.  Signed 02/15/2018 19:03:05    < end of copied text >      < from: US Duplex Venous Lower Ext Ltd, Left (09.26.18 @ 13:05) >  IMPRESSION:     No evidence of left lower extremity deep venous thrombosis.    < end of copied text >    < from: Xray Foot AP + Lateral, Left (09.26.18 @ 14:01) >  IMPRESSION:   No subcutaneous gas, radiopaque foreign bodies, or radiographic evidence   for osteomyelitis.    No fractures or dislocations.    Tarsometatarsal alignment maintained without evidence for a Lisfranc   injury.    Preserved joint spaces and no joint margin erosions.    Small Víctor deformity and calcaneal enthesophytes.    Generalized osteopenia otherwise no discrete lytic or blastic lesions.    Vascular calcifications.    < end of copied text >      ASSESSMENT AND PLAN:    73 y/o Female PMH HTN, mild CKD, Hyperlipidemia, DM-II, CAD s/p 3V CABG  (LIMA to LAD, SVG to OM, SVG to PDA) at [facility] 2014, s/p DARRIAN TO RI 11/17/17, NSTEMI 12/2017 s/p [facility] on 12/5 and DARRIAN to dRamus, TTE at that time revealed grossly moderate to severe LV dysfunction with hypokinesis of the inferior, lateral and inferolateral walls with an EF of 36% s/p C on 2/6  with no new OBS CAD, managed medically, admitted with chest pain, ZEE, acute on chronic systolic HF    --pt. still volume overloaded   --would continue with IV lasix if ok with renal  --follow up renal   --Elevated Trop T with neg CPK and no acute EKG changes, low suspicion for ACS, can cont to trend  --LLE doppler and L foot xrays are unremarkable for DVT or fracture respectively   --continue with dapt for history of darrian    Corie Ga MD
pt seen and examined, family at bedside, denies CP or ZEE      MEDICATIONS  (STANDING):  aspirin enteric coated 81 milliGRAM(s) Oral daily  atorvastatin 40 milliGRAM(s) Oral at bedtime  dextrose 5%. 1000 milliLiter(s) (50 mL/Hr) IV Continuous <Continuous>  dextrose 50% Injectable 12.5 Gram(s) IV Push once  dextrose 50% Injectable 25 Gram(s) IV Push once  dextrose 50% Injectable 25 Gram(s) IV Push once  docusate sodium 100 milliGRAM(s) Oral three times a day  ferrous    sulfate 325 milliGRAM(s) Oral daily  furosemide    Tablet 40 milliGRAM(s) Oral two times a day  insulin glargine Injectable (LANTUS) 35 Unit(s) SubCutaneous at bedtime  insulin lispro (HumaLOG) corrective regimen sliding scale   SubCutaneous three times a day before meals  insulin lispro (HumaLOG) corrective regimen sliding scale   SubCutaneous at bedtime  insulin lispro Injectable (HumaLOG) 16 Unit(s) SubCutaneous three times a day before meals  levothyroxine 50 MICROGram(s) Oral daily  metoprolol tartrate 12.5 milliGRAM(s) Oral two times a day  montelukast 10 milliGRAM(s) Oral at bedtime  multivitamin 1 Tablet(s) Oral daily  senna 2 Tablet(s) Oral at bedtime  ticagrelor 90 milliGRAM(s) Oral two times a day    MEDICATIONS  (PRN):  dextrose 40% Gel 15 Gram(s) Oral once PRN Blood Glucose LESS THAN 70 milliGRAM(s)/deciliter  glucagon  Injectable 1 milliGRAM(s) IntraMuscular once PRN Glucose LESS THAN 70 milligrams/deciliter      LABS:                        11.2   9.92  )-----------( 295      ( 03 Oct 2018 07:19 )             34.5    142  |  105  |  57<H>  ----------------------------<  110<H>  3.5   |  21<L>  |  1.59<H>    Ca    9.1      03 Oct 2018 06:19  Mg     2.7     10-03    Creatinine Trend: 1.59<--, 1.97<--, 2.07<--, 1.96<--, 1.96<--, 1.96<--    PHYSICAL EXAM  Vital Signs Last 24 Hrs  T(C): 36.6 (03 Oct 2018 05:00), Max: 36.7 (02 Oct 2018 21:04)  T(F): 97.8 (03 Oct 2018 05:00), Max: 98 (02 Oct 2018 21:04)  HR: 74 (03 Oct 2018 05:00) (70 - 78)  BP: 117/70 (03 Oct 2018 05:00) (112/64 - 126/78)  RR: 16 (03 Oct 2018 05:00) (15 - 16)  SpO2: 98% (03 Oct 2018 05:00) (98% - 100%)    Cardiovascular:  S1S2 RRR,   Respiratory: grossly CTA BL  Gastrointestinal: Abdomen soft, ND, NT, +BS  Skin: Warm, dry, intact. No rash.  Musculoskeletal: Normal ROM, normal strength  Ext: No C/C/E B/L LE    DIAGNOSTIC DATA  TELEMETRY: NSR    < from: TTE with Doppler (w/Cont) (09.06.18 @ 15:21) >  CONCLUSIONS: EF 37%  1. Mitral annular calcification, otherwise normal mitral  valve. Mild mitral regurgitation.  2. Normal left ventricular internal dimensions and wall  thicknesses.  3. Endocardium not well visualized; grossly moderate to  severe global left ventricular systolic dysfunction.  The  inferolateral wall appears particularly hypokinetic.  Endocardial visualization enhanced with intravenous  injection of echo contrast (Definity).  4. The right ventricle is not well visualized; grossly  normal right ventricular systolic function.    < end of copied text >    < from: Cardiac Cath Lab - Adult (02.06.18 @ 16:26) >  VENTRICLES: No left ventriculogram was performed.  CORONARY VESSELS: The coronary circulation is right dominant.  LM:   --  LM:Angiography showed mild atherosclerosis with no flow limiting  lesions.  LAD:   --  Proximal LAD: There was a diffuse 90 % stenosis at a site with  no prior intervention. The lesion was eccentric. There was a large  vascular territory distal to the lesion and good blood supply to the  distal myocardium from a graft.  CX:   --  Proximal circumflex: There was a diffuse 40 % stenosis at a site  with no prior intervention. The lesion was eccentric. There was PARUL grade  3 flow through the vessel (brisk flow).  RI:   --  Proximal ramus intermedius: There was a diffuse 10 % stenosis at  the site of a prior stent. The lesion was smoothly contoured. There was  PARUL grade 3 flow through the vessel (brisk flow).  RCA:   --  Mid RCA: There was a 100 % stenosis. There was a moderate-sized  vascular territory distal to the lesion and poor collateral blood supply  to the distal myocardium. This lesion is a chronic total occlusion.  GRAFTS:   --  Graft to the LAD: The graft was a LIMA. Graft angiography  showed no degeneration. Distal vessel angiography showed mild diffuse  disease.  COMPLICATIONS: There were no complications. No complications occurred  during the cath lab visit.  DIAGNOSTIC IMPRESSIONS: Patent GOMES to LAD  Patent stent ramus   of RCA , collateralized from LAD  DIAGNOSTIC RECOMMENDATIONS: Medical therapy  INTERVENTIONAL IMPRESSIONS: Patent GOMES to LAD  Patent stent ramus   of RCA , collateralized from LAD  INTERVENTIONAL RECOMMENDATIONS: Medical therapy  Prepared and signed by  Gretchen Guerra M.D.  Signed 02/15/2018 19:03:05    < end of copied text >      < from: US Duplex Venous Lower Ext Ltd, Left (09.26.18 @ 13:05) >  IMPRESSION:     No evidence of left lower extremity deep venous thrombosis.    < end of copied text >    < from: Xray Foot AP + Lateral, Left (09.26.18 @ 14:01) >  IMPRESSION:   No subcutaneous gas, radiopaque foreign bodies, or radiographic evidence   for osteomyelitis.    No fractures or dislocations.    Tarsometatarsal alignment maintained without evidence for a Lisfranc   injury.    Preserved joint spaces and no joint margin erosions.    Small Víctor deformity and calcaneal enthesophytes.    Generalized osteopenia otherwise no discrete lytic or blastic lesions.    Vascular calcifications.    < end of copied text >      ASSESSMENT AND PLAN:    71 y/o Female PMH HTN, mild CKD, Hyperlipidemia, DM-II, CAD s/p 3V CABG  (LIMA to LAD, SVG to OM, SVG to PDA) at [facility] 2014, s/p CORNELIA TO RI 11/17/17, NSTEMI 12/2017 s/p [facility] on 12/5 and CORNELIA to dRamus, TTE at that time revealed grossly moderate to severe LV dysfunction with hypokinesis of the inferior, lateral and inferolateral walls with an EF of 36% s/p C on 2/6  with no new OBS CAD, managed medically, admitted with chest pain, ZEE, acute on chronic systolic HF    --MERVIN resolving, resume Lasix 40 BID  --RVP 10/2 negative  --cont Asa, Brilinta, statin, Metoprolol  --DC home and F/U Dr Dennison
pt seen and examined, family at bedside, denies CP, reports ZEE (to bathroom) grossly unchanged, currently comfortably sitting in chair.       MEDICATIONS  (STANDING):  aspirin enteric coated 81 milliGRAM(s) Oral daily  atorvastatin 40 milliGRAM(s) Oral at bedtime  docusate sodium 100 milliGRAM(s) Oral three times a day  ferrous    sulfate 325 milliGRAM(s) Oral daily  furosemide    Tablet 40 milliGRAM(s) Oral two times a day  insulin glargine Injectable (LANTUS) 35 Unit(s) SubCutaneous at bedtime  insulin lispro (HumaLOG) corrective regimen sliding scale   SubCutaneous three times a day before meals  insulin lispro (HumaLOG) corrective regimen sliding scale   SubCutaneous at bedtime  insulin lispro Injectable (HumaLOG) 16 Unit(s) SubCutaneous three times a day before meals  levothyroxine 50 MICROGram(s) Oral daily  metoprolol tartrate 12.5 milliGRAM(s) Oral two times a day  montelukast 10 milliGRAM(s) Oral at bedtime  multivitamin 1 Tablet(s) Oral daily  senna 2 Tablet(s) Oral at bedtime  ticagrelor 90 milliGRAM(s) Oral two times a day    MEDICATIONS  (PRN):  dextrose 40% Gel 15 Gram(s) Oral once PRN Blood Glucose LESS THAN 70 milliGRAM(s)/deciliter  glucagon  Injectable 1 milliGRAM(s) IntraMuscular once PRN Glucose LESS THAN 70 milligrams/deciliter      LABS:                        11.9   10.15 )-----------( 316      ( 01 Oct 2018 05:50 )             36.4     139  |  98  |  80<H>  ----------------------------<  227<H>  3.7   |  22  |  2.07<H>    Ca    9.5      01 Oct 2018 05:50  Mg     2.6     10-01    Creatinine Trend: 2.07<--, 1.96<--, 1.96<--, 1.96<--, 1.90<--, 1.76<--    PHYSICAL EXAM  Vital Signs Last 24 Hrs  T(C): 36.4 (01 Oct 2018 12:15), Max: 36.7 (30 Sep 2018 13:07)  T(F): 97.5 (01 Oct 2018 12:15), Max: 98 (30 Sep 2018 13:07)  HR: 76 (01 Oct 2018 12:15) (72 - 81)  BP: 118/62 (01 Oct 2018 12:15) (109/60 - 122/55)  RR: 16 (01 Oct 2018 12:15) (16 - 17)  SpO2: 100% (01 Oct 2018 12:15) (99% - 100%)      Cardiovascular:  S1S2 RRR,   Respiratory: grossly CTA BL  Gastrointestinal: Abdomen soft, ND, NT, +BS  Skin: Warm, dry, intact. No rash.  Musculoskeletal: Normal ROM, normal strength  Ext: No C/C/E B/L LE    DIAGNOSTIC DATA  TELEMETRY: NSR    < from: TTE with Doppler (w/Cont) (09.06.18 @ 15:21) >  CONCLUSIONS: EF 37%  1. Mitral annular calcification, otherwise normal mitral  valve. Mild mitral regurgitation.  2. Normal left ventricular internal dimensions and wall  thicknesses.  3. Endocardium not well visualized; grossly moderate to  severe global left ventricular systolic dysfunction.  The  inferolateral wall appears particularly hypokinetic.  Endocardial visualization enhanced with intravenous  injection of echo contrast (Definity).  4. The right ventricle is not well visualized; grossly  normal right ventricular systolic function.    < end of copied text >    < from: Cardiac Cath Lab - Adult (02.06.18 @ 16:26) >  VENTRICLES: No left ventriculogram was performed.  CORONARY VESSELS: The coronary circulation is right dominant.  LM:   --  LM:Angiography showed mild atherosclerosis with no flow limiting  lesions.  LAD:   --  Proximal LAD: There was a diffuse 90 % stenosis at a site with  no prior intervention. The lesion was eccentric. There was a large  vascular territory distal to the lesion and good blood supply to the  distal myocardium from a graft.  CX:   --  Proximal circumflex: There was a diffuse 40 % stenosis at a site  with no prior intervention. The lesion was eccentric. There was PARUL grade  3 flow through the vessel (brisk flow).  RI:   --  Proximal ramus intermedius: There was a diffuse 10 % stenosis at  the site of a prior stent. The lesion was smoothly contoured. There was  PARUL grade 3 flow through the vessel (brisk flow).  RCA:   --  Mid RCA: There was a 100 % stenosis. There was a moderate-sized  vascular territory distal to the lesion and poor collateral blood supply  to the distal myocardium. This lesion is a chronic total occlusion.  GRAFTS:   --  Graft to the LAD: The graft was a LIMA. Graft angiography  showed no degeneration. Distal vessel angiography showed mild diffuse  disease.  COMPLICATIONS: There were no complications. No complications occurred  during the cath lab visit.  DIAGNOSTIC IMPRESSIONS: Patent GOMES to LAD  Patent stent ramus   of RCA , collateralized from LAD  DIAGNOSTIC RECOMMENDATIONS: Medical therapy  INTERVENTIONAL IMPRESSIONS: Patent GOMES to LAD  Patent stent ramus   of RCA , collateralized from LAD  INTERVENTIONAL RECOMMENDATIONS: Medical therapy  Prepared and signed by  Gretchen Guerra M.D.  Signed 02/15/2018 19:03:05    < end of copied text >      < from: US Duplex Venous Lower Ext Ltd, Left (09.26.18 @ 13:05) >  IMPRESSION:     No evidence of left lower extremity deep venous thrombosis.    < end of copied text >    < from: Xray Foot AP + Lateral, Left (09.26.18 @ 14:01) >  IMPRESSION:   No subcutaneous gas, radiopaque foreign bodies, or radiographic evidence   for osteomyelitis.    No fractures or dislocations.    Tarsometatarsal alignment maintained without evidence for a Lisfranc   injury.    Preserved joint spaces and no joint margin erosions.    Small Víctor deformity and calcaneal enthesophytes.    Generalized osteopenia otherwise no discrete lytic or blastic lesions.    Vascular calcifications.    < end of copied text >      ASSESSMENT AND PLAN:    73 y/o Female PMH HTN, mild CKD, Hyperlipidemia, DM-II, CAD s/p 3V CABG  (LIMA to LAD, SVG to OM, SVG to PDA) at [facility] 2014, s/p CORNELIA TO RI 11/17/17, NSTEMI 12/2017 s/p C on 12/5 and CORNELIA to dRamus, TTE at that time revealed grossly moderate to severe LV dysfunction with hypokinesis of the inferior, lateral and inferolateral walls with an EF of 36% s/p [facility] on 2/6  with no new OBS CAD, managed medically, admitted with chest pain, ZEE, acute on chronic systolic HF    --clinically euvolemic with rising creatinine.  Renal f/u noted, no objection to holding lasix for a few days  --PT  --cont Asa, Brilinta, statin, Metoprolol
pt seen and examined, no complaints, ROS - .     aspirin enteric coated 81 milliGRAM(s) Oral daily  atorvastatin 40 milliGRAM(s) Oral at bedtime  dextrose 40% Gel 15 Gram(s) Oral once PRN  dextrose 5%. 1000 milliLiter(s) IV Continuous <Continuous>  dextrose 50% Injectable 12.5 Gram(s) IV Push once  dextrose 50% Injectable 25 Gram(s) IV Push once  dextrose 50% Injectable 25 Gram(s) IV Push once  docusate sodium 100 milliGRAM(s) Oral three times a day  ferrous    sulfate 325 milliGRAM(s) Oral daily  furosemide    Tablet 40 milliGRAM(s) Oral two times a day  glucagon  Injectable 1 milliGRAM(s) IntraMuscular once PRN  insulin glargine Injectable (LANTUS) 30 Unit(s) SubCutaneous at bedtime  insulin lispro (HumaLOG) corrective regimen sliding scale   SubCutaneous three times a day before meals  insulin lispro (HumaLOG) corrective regimen sliding scale   SubCutaneous at bedtime  insulin lispro Injectable (HumaLOG) 12 Unit(s) SubCutaneous three times a day before meals  levothyroxine 50 MICROGram(s) Oral daily  metoprolol tartrate 12.5 milliGRAM(s) Oral two times a day  montelukast 10 milliGRAM(s) Oral at bedtime  multivitamin 1 Tablet(s) Oral daily  senna 2 Tablet(s) Oral at bedtime  ticagrelor 90 milliGRAM(s) Oral two times a day                            11.9   10.38 )-----------( 295      ( 29 Sep 2018 05:43 )             35.8       Hemoglobin: 11.9 g/dL (09-29 @ 05:43)  Hemoglobin: 11.7 g/dL (09-28 @ 04:55)  Hemoglobin: 11.5 g/dL (09-27 @ 06:23)  Hemoglobin: 11.0 g/dL (09-26 @ 04:00)  Hemoglobin: 11.2 g/dL (09-26 @ 01:40)      09-29    139  |  95<L>  |  72<H>  ----------------------------<  173<H>  3.4<L>   |  24  |  1.96<H>    Ca    10.1      29 Sep 2018 05:43  Mg     2.3     09-29      Creatinine Trend: 1.96<--, 1.96<--, 1.90<--, 1.76<--, 1.65<--, 1.59<--    COAGS:           T(C): 36.4 (09-30-18 @ 05:40), Max: 36.6 (09-29-18 @ 12:20)  HR: 76 (09-30-18 @ 05:40) (75 - 82)  BP: 119/62 (09-30-18 @ 05:40) (110/60 - 119/66)  RR: 16 (09-30-18 @ 05:40) (16 - 17)  SpO2: 100% (09-30-18 @ 05:40) (100% - 100%)  Wt(kg): --    I&O's Summary    29 Sep 2018 07:01  -  30 Sep 2018 07:00  --------------------------------------------------------  IN: 928 mL / OUT: 880 mL / NET: 48 mL      Cardiovascular:  S1S2 RRR,   Respiratory: grossly CTA BL  Gastrointestinal: Abdomen soft, ND, NT, +BS  Skin: Warm, dry, intact. No rash.  Musculoskeletal: Normal ROM, normal strength  Ext: No C/C/E B/L LE    DIAGNOSTIC DATA  TELEMETRY: NSR    < from: TTE with Doppler (w/Cont) (09.06.18 @ 15:21) >  CONCLUSIONS: EF 37%  1. Mitral annular calcification, otherwise normal mitral  valve. Mild mitral regurgitation.  2. Normal left ventricular internal dimensions and wall  thicknesses.  3. Endocardium not well visualized; grossly moderate to  severe global left ventricular systolic dysfunction.  The  inferolateral wall appears particularly hypokinetic.  Endocardial visualization enhanced with intravenous  injection of echo contrast (Definity).  4. The right ventricle is not well visualized; grossly  normal right ventricular systolic function.    < end of copied text >    < from: Cardiac Cath Lab - Adult (02.06.18 @ 16:26) >  VENTRICLES: No left ventriculogram was performed.  CORONARY VESSELS: The coronary circulation is right dominant.  LM:   --  LM:Angiography showed mild atherosclerosis with no flow limiting  lesions.  LAD:   --  Proximal LAD: There was a diffuse 90 % stenosis at a site with  no prior intervention. The lesion was eccentric. There was a large  vascular territory distal to the lesion and good blood supply to the  distal myocardium from a graft.  CX:   --  Proximal circumflex: There was a diffuse 40 % stenosis at a site  with no prior intervention. The lesion was eccentric. There was PARUL grade  3 flow through the vessel (brisk flow).  RI:   --  Proximal ramus intermedius: There was a diffuse 10 % stenosis at  the site of a prior stent. The lesion was smoothly contoured. There was  PARUL grade 3 flow through the vessel (brisk flow).  RCA:   --  Mid RCA: There was a 100 % stenosis. There was a moderate-sized  vascular territory distal to the lesion and poor collateral blood supply  to the distal myocardium. This lesion is a chronic total occlusion.  GRAFTS:   --  Graft to the LAD: The graft was a LIMA. Graft angiography  showed no degeneration. Distal vessel angiography showed mild diffuse  disease.  COMPLICATIONS: There were no complications. No complications occurred  during the cath lab visit.  DIAGNOSTIC IMPRESSIONS: Patent GOMES to LAD  Patent stent ramus   of RCA , collateralized from LAD  DIAGNOSTIC RECOMMENDATIONS: Medical therapy  INTERVENTIONAL IMPRESSIONS: Patent GOMES to LAD  Patent stent ramus   of RCA , collateralized from LAD  INTERVENTIONAL RECOMMENDATIONS: Medical therapy  Prepared and signed by  Gretchen Guerra M.D.  Signed 02/15/2018 19:03:05    < end of copied text >      < from: US Duplex Venous Lower Ext Ltd, Left (09.26.18 @ 13:05) >  IMPRESSION:     No evidence of left lower extremity deep venous thrombosis.    < end of copied text >    < from: Xray Foot AP + Lateral, Left (09.26.18 @ 14:01) >  IMPRESSION:   No subcutaneous gas, radiopaque foreign bodies, or radiographic evidence   for osteomyelitis.    No fractures or dislocations.    Tarsometatarsal alignment maintained without evidence for a Lisfranc   injury.    Preserved joint spaces and no joint margin erosions.    Small Víctor deformity and calcaneal enthesophytes.    Generalized osteopenia otherwise no discrete lytic or blastic lesions.    Vascular calcifications.    < end of copied text >      ASSESSMENT AND PLAN:    71 y/o Female PMH HTN, mild CKD, Hyperlipidemia, DM-II, CAD s/p 3V CABG  (LIMA to LAD, SVG to OM, SVG to PDA) at [facility] 2014, s/p CORNELIA TO RI 11/17/17, NSTEMI 12/2017 s/p [facility] on 12/5 and CORNELIA to dRamus, TTE at that time revealed grossly moderate to severe LV dysfunction with hypokinesis of the inferior, lateral and inferolateral walls with an EF of 36% s/p [facility] on 2/6  with no new OBS CAD, managed medically, admitted with chest pain, ZEE, acute on chronic systolic HF     cont Diuresis with PO meds    GI / DVT prophylaxis.   keep K>4, mag >2.0   renal follow up   Physical therapy   supp o2 titrate as can tolerate   D/W Dr Ga
[facility]   MD RAHEEL SHAH MD ANGELA WONG, PA    TEL:  OFFICE: [phone]  DR SANTOYO CELL: [phone]  DR. NGUYEN CELL: [phone]  UMM KENDALL CELL: [phone]        Patient is a 72y old  Female who presents with a chief complaint of SOB, ZEE and Midsternal chest heaviness (27 Sep 2018 15:57)      Patient seen and examined at bedside. still slightly SOB but much better than before     VITALS:  T(F): 97.2 (09-27-18 @ 12:42), Max: 97.6 (09-26-18 @ 21:48)  HR: 70 (09-27-18 @ 12:42)  BP: 129/64 (09-27-18 @ 12:42)  RR: 17 (09-27-18 @ 12:42)  SpO2: 99% (09-27-18 @ 12:42)  Wt(kg): --    09-26 @ 07:01  -  09-27 @ 07:00  --------------------------------------------------------  IN: 200 mL / OUT: 500 mL / NET: -300 mL    09-27 @ 07:01  -  09-27 @ 16:04  --------------------------------------------------------  IN: 250 mL / OUT: 400 mL / NET: -150 mL          PHYSICAL EXAM:  Constitutional: NAD  Neck: No JVD  Respiratory: CTAB, no wheezes, rales or rhonchi  Cardiovascular: S1, S2, RRR  Gastrointestinal: BS+, soft, NT/ND  Extremities: No peripheral edema    Hospital Medications:   MEDICATIONS  (STANDING):  aspirin enteric coated 81 milliGRAM(s) Oral daily  atorvastatin 40 milliGRAM(s) Oral at bedtime  dextrose 5%. 1000 milliLiter(s) (50 mL/Hr) IV Continuous <Continuous>  dextrose 50% Injectable 12.5 Gram(s) IV Push once  dextrose 50% Injectable 25 Gram(s) IV Push once  dextrose 50% Injectable 25 Gram(s) IV Push once  ferrous    sulfate 325 milliGRAM(s) Oral daily  furosemide   Injectable 60 milliGRAM(s) IV Push two times a day  insulin glargine Injectable (LANTUS) 15 Unit(s) SubCutaneous at bedtime  insulin lispro (HumaLOG) corrective regimen sliding scale   SubCutaneous three times a day before meals  insulin lispro (HumaLOG) corrective regimen sliding scale   SubCutaneous at bedtime  insulin lispro Injectable (HumaLOG) 8 Unit(s) SubCutaneous three times a day before meals  levothyroxine 50 MICROGram(s) Oral daily  metoprolol tartrate 12.5 milliGRAM(s) Oral two times a day  montelukast 10 milliGRAM(s) Oral at bedtime  multivitamin 1 Tablet(s) Oral daily  sodium bicarbonate 650 milliGRAM(s) Oral three times a day  ticagrelor 90 milliGRAM(s) Oral two times a day      LABS:  09-27    140  |  98  |  52<H>  ----------------------------<  237<H>  4.1   |  22  |  1.90<H>    Ca    10.5      27 Sep 2018 06:23  Phos  3.2     09-26  Mg     2.3     09-27    TPro  7.6  /  Alb  3.9  /  TBili  0.6  /  DBili      /  AST  26  /  ALT  15  /  AlkPhos  69  09-26    Creatinine Trend: 1.90 <--, 1.76 <--, 1.65 <--, 1.59 <--                                11.5   10.09 )-----------( 284      ( 27 Sep 2018 06:23 )             35.3     Urine Studies:  Urinalysis - [09-01-18 @ 04:29]      Color YELLOW / Appearance CLEAR / SG 1.015 / pH 6.5      Gluc 250 / Ketone NEGATIVE  / Bili NEGATIVE / Urobili NORMAL       Blood MODERATE / Protein MODERATE / Leuk Est NEGATIVE / Nitrite NEGATIVE      RBC 5-10 / WBC 2-5 / Hyaline  / Gran  / Sq Epi OCC. / Non Sq Epi  / Bacteria       Iron 40, TIBC 377, %sat --      [01-20-18 @ 06:06]  Ferritin 38.35      [01-20-18 @ 06:06]  PTH 43.55 (Ca --)      [09-10-18 @ 07:15]   --  PTH 36.60 (Ca --)      [09-08-18 @ 03:15]   --  Vitamin D (25OH) 15.8      [09-08-18 @ 03:15]  HbA1c 7.8      [09-25-18 @ 04:20]  TSH 2.03      [09-25-18 @ 04:20]  Lipid: chol 132, , HDL 31, LDL 78      [09-25-18 @ 04:20]        RADIOLOGY & ADDITIONAL STUDIES:

## 2018-10-03 NOTE — DISCHARGE NOTE ADULT - HOSPITAL COURSE
71 y/o female with a PMHx of CAD S/P CABG, ischemic cardiomyopathy with moderate LV dysfunction (EF 37%), HTN, HLD, DM, hypothyroidism and GERD presented to ED with dyspnea on exertion secondary to acute on chronic systolic heart failure, admitted to telemetry.    Patient was treated for:  + Acute on chronic systolic heart failure - po Lasix was on hold for MERVIN and then resumed, s/p IV Lasix, s/p BIPAP  + Elevated hsTroponins - Heparin gtt discontinued on 9/26 as CPK neg and no acute EKG changes, low suspicion for ACS  + Uncontrolled DM - Endo following (Dr. Banks), insulin regimen adjusted  + MERVIN on CKD - Lasix was on hold and then resumed when Cr trended down, Renal (Dr. Muhammad) following     Labs/Tests:  EKG: NSR at 83 bpm with TWI I and AVL, QTc 493  hsTrop: 724-->816-->857  CK negative  WBC: 10.93  BUN/Cr: 34/1.59  Glucose: 63  HgA1C: 7.8  ProBNP: 2483  RVP: negative  9/25 CXR: Mild pulmonary edema.  9/26 L foot xray (pt complained of L foot pain):  No subcutaneous gas, radiopaque foreign bodies, or radiographic evidence  for osteomyelitis. No fractures or dislocations. Tarsometatarsal alignment maintained without evidence for a Lisfranc injury. Preserved joint spaces and no joint margin erosions. Small Víctor deformity and calcaneal enthesophytes. Generalized osteopenia otherwise no discrete lytic or blastic lesions. Vascular calcifications.  9/26 LLE doppler: No evidence of left lower extremity deep venous thrombosis.

## 2018-10-03 NOTE — DISCHARGE NOTE ADULT - MONITOR YOUR WEIGHT DAILY. CALL YOUR DOCTOR FOR A WEIGHT GAIN OF 2-3 LBS. IN 24 HRS OR 3 LBS. OR MORE IN 1 WEEK, INCREASED SHORTNESS OF BREATH, TIREDNESS OR WEAKNESS, OR INCREASED SWELLING OF YOUR FEET AND LEGS
Bed: B06A  Expected date: 5/24/18  Expected time: 5:35 PM  Means of arrival: [facility] Dept (6)  Comments:  76M CC: CODE STEMI, CP A&Ox4 114/64 84bpm 98% 16R +IV   324 ASA given  Hx of 3 stents    ECG reviewed by Tasia Peters MD   Statement Selected

## 2018-10-03 NOTE — PROGRESS NOTE ADULT - PROVIDER SPECIALTY LIST ADULT
Cardiology
Endocrinology
Nephrology
Cardiology
Cardiology
Nephrology

## 2018-10-03 NOTE — PROGRESS NOTE ADULT - REASON FOR ADMISSION
SOB, ZEE and Midsternal chest heaviness

## 2018-10-03 NOTE — PROGRESS NOTE ADULT - PROBLEM SELECTOR PLAN 1
Pt with CKD sec to long standing DM/HTN and CHF.   Baseline SCr 1.5-1.8  Lasix on hold last dose 10/1 AM,   renal function improving, can resume lasix 40mg po bid. stable to discharge from renal stand point   Avoid nephrotoxic agents, NSAIDS, RCA

## 2018-10-03 NOTE — PROGRESS NOTE ADULT - PROBLEM SELECTOR PLAN 1
Will continue current insulin regimen for now. Will continue monitoring FS, log, will Follow up.  DC home on current insulin regimen, FU 1 week. Discussed with patient and family.  Patient counseled for compliance with consistent low carb diet.

## 2018-10-03 NOTE — DISCHARGE NOTE ADULT - ADDITIONAL INSTRUCTIONS
Follow up with your primary care physician, Cardiologist, and Endocrinologist within 1-2 weeks. Call for appointments.

## 2018-10-03 NOTE — DISCHARGE NOTE ADULT - MEDICATION SUMMARY - MEDICATIONS TO STOP TAKING
I will STOP taking the medications listed below when I get home from the hospital:    sodium bicarbonate 650 mg oral tablet  -- 1 tab(s) by mouth 3 times a day

## 2018-10-03 NOTE — PROGRESS NOTE ADULT - PROBLEM SELECTOR PROBLEM 3
Acidosis
CAD (coronary artery disease)

## 2018-10-03 NOTE — DISCHARGE NOTE ADULT - MEDICATION SUMMARY - MEDICATIONS TO CHANGE
I will SWITCH the dose or number of times a day I take the medications listed below when I get home from the hospital:    furosemide 40 mg oral tablet  -- 1 tab(s) by mouth once a day    Meds to beds  Room 4N 421A  Discharge: 12/11/17 at 5pm   Pager: [phone]    Apidra 100 units/mL subcutaneous solution  -- 24 microcurie subcutaneous 3 times a day (before meals)    Toujeo SoloStar 300 units/mL subcutaneous solution  -- 66 unit(s) subcutaneous once a day (at bedtime) , 1 month supply

## 2018-10-03 NOTE — PROGRESS NOTE ADULT - PROBLEM SELECTOR PROBLEM 1
CKD (chronic kidney disease), stage III
Type 2 diabetes mellitus with hypoglycemia without coma, with long-term current use of insulin
CKD (chronic kidney disease), stage III

## 2018-10-03 NOTE — DISCHARGE NOTE ADULT - CARE PROVIDER_API CALL
Vargas Adame), Cardiovascular Disease  [address] [address]  Phone: [phone]  Fax: [phone]    Jillian Banks), EndocrinologyMetabDiabetes; Internal Medicine  [address] [place] [address]  Phone: [phone]  Fax: [phone] Jillian Banks), EndocrinologyMetabDiabetes; Internal Medicine  [address] [place] [address]  Phone: [phone]  Fax: [phone]    Jakob Dennison), Cardiology  [address] [facility] [address] [place] [address]  Phone: [phone]  Fax: [phone]

## 2018-10-03 NOTE — DISCHARGE NOTE ADULT - MEDICATION SUMMARY - MEDICATIONS TO TAKE
I will START or STAY ON the medications listed below when I get home from the hospital:    U-100 Insulin Syringe, 1/2 cc  -- Use as directed 4 times a day.  Qty UOM: 100 syringes  -- Indication: For Diabetes    Alcohol Swab Pads  -- Use as directed.  Qty UOM: 100 swabs  -- Indication: For Diabetes    Glucometer (per patient's insurance)  -- Use as directed 4 times a day.  Qty UOM: 1 kit  -- Indication: For Diabetes    Glucose test strips (per patient's insurance)  -- Test blood sugar 4 times a day  Qty UOM: 100 strips  -- Indication: For Diabetes    Lancets  -- Test blood sugar 4 times a day  Qty UOM: 100 lancets  -- Indication: For Diabetes    aspirin 81 mg oral delayed release tablet  -- 1 tab(s) by mouth once a day  -- Indication: For CAD (coronary artery disease)    acetaminophen 325 mg oral tablet  -- 2 tab(s) by mouth every 6 hours, As needed, Mild, moderate and severe pain  -- Indication: For Pain    Toujeo SoloStar 300 units/mL subcutaneous solution  -- 35 unit(s) subcutaneous once a day (at bedtime)  , 1 month supply   -- Indication: For Diabetes    Apidra 100 units/mL injectable solution  -- 16 unit(s) injectable 3 times a day (before meals)   -- Check with your doctor before becoming pregnant.  Do not drink alcoholic beverages when taking this medication.  Keep in refrigerator.  Do not freeze.  Obtain medical advice before taking any non-prescription drugs as some may affect the action of this medication.    -- Indication: For Diabetes    atorvastatin 40 mg oral tablet  -- 1 tab(s) by mouth once a day  -- Indication: For CAD (coronary artery disease)    ticagrelor 90 mg oral tablet  -- 1 tab(s) by mouth 2 times a day    Meds to beds  Room 4N 421A  Discharge: 12/11/17 at 5pm   Pager: [phone]  -- Indication: For CAD (coronary artery disease)    metoprolol tartrate 25 mg oral tablet  -- 0.5 tab(s) by mouth 2 times a day   -- It is very important that you take or use this exactly as directed.  Do not skip doses or discontinue unless directed by your doctor.  May cause drowsiness.  Alcohol may intensify this effect.  Use care when operating dangerous machinery.  Some non-prescription drugs may aggravate your condition.  Read all labels carefully.  If a warning appears, check with your doctor before taking.  Take with food or milk.  This drug may impair the ability to drive or operate machinery.  Use care until you become familiar with its effects.    -- Indication: For CAD (coronary artery disease)    alendronate 70 mg oral tablet  -- 1 tab(s) by mouth once a week  -- Indication: For Osteoporosis    furosemide 40 mg oral tablet  -- 1 tab(s) by mouth 2 times a day  -- Indication: For Acute on chronic systolic (congestive) heart failure    raNITIdine 150 mg oral capsule  -- 1 cap(s) by mouth 2 times a day  -- Indication: For GERD    FeroSul 325 mg (65 mg elemental iron) oral tablet  -- 1 tab(s) by mouth once a day   -- Indication: For Iron deficiency anemia    docusate sodium 100 mg oral capsule  -- 1 cap(s) by mouth 3 times a day  -- Indication: For Constipation    senna oral tablet  -- 2 tab(s) by mouth once a day (at bedtime)  -- Indication: For Constipation    montelukast 10 mg oral tablet  -- 1 tab(s) by mouth once a day (at bedtime)  -- Indication: For SOB (shortness of breath)    levothyroxine 50 mcg (0.05 mg) oral tablet  -- 1 tab(s) by mouth once a day  -- Indication: For Hypothyroidism    Vol-Care Rx oral tablet  -- 1 tab(s) by mouth once a day  -- Indication: For Supplement

## 2018-10-03 NOTE — DISCHARGE NOTE ADULT - PLAN OF CARE
To relieve and prevent worsening symptoms associated with congestive heart failure, to improve quality of life, and to treat underlying conditions such as coronary heart disease, high blood pressure, or diabetes, and to maintain euvolemia. Continue Lasix 40mg twice a day. Low salt diet, fluid restriction to 1500 ml daily, monitor your fluid intake and weight daily, exercise as tolerated 30 minutes daily, and follow up with your physician within 1 to 2 weeks. Low suspicion for ACS. Continue Aspirin, Brilinta, Atorvastatin, Metoprolol. Follow up with your primary care physician and Cardiologist within 1-2 weeks. Call for appointments. You were started on Lantus 35U at bedtime and Humalog 16U three times a day before meals. Monitor finger sticks pre-meal and bedtime, low salt, fat and carbohydrate diet, minimize glucose intake.  Exercise daily for at least 30 minutes and weight loss.  Follow up with primary care physician and endocrinologist for routine Hemoglobin A1C checks and management. You may follow up with Dr. Banks (Endocrinologist) located at [address] [place] [address], Phone: [phone]. Follow up with your ophthalmologist for routine yearly vision exams. Continue aspirin, Brilinta, and Atorvastatin, do not stop unless instructed by your physician.  Continue low salt, fat, cholesterol and carbohydrate diet. Follow up with cardiologist and primary care physician's routine appointment. Renal function improving. Continue blood pressure, cholesterol and diabetic medications. Goal of hemoglobin A1C (HgbA1C) < 7%.  Avoid nephrotoxic drugs such as nonsteroidal anti-inflammatory agents (NSAIDs).   Please follow up with your nephrologist or primary care physician to monitor your kidney function, continue with low protein and potassium diet. Continue Levothyroxine. Follow up with your Endocrinologist routinely to monitor your Thyroid Function Tests. Continue Atorvastatin. Low fat diet, exercise daily. Follow up with primary care physician and cardiologist for management.

## 2018-11-02 ENCOUNTER — INPATIENT (INPATIENT)
Facility: HOSPITAL | Age: 73
LOS: 5 days | Discharge: HOME HEALTH | End: 2018-11-08
Attending: INTERNAL MEDICINE | Admitting: INTERNAL MEDICINE
Payer: MEDICARE

## 2018-11-02 VITALS
SYSTOLIC BLOOD PRESSURE: 123 MMHG | TEMPERATURE: 97 F | HEART RATE: 72 BPM | WEIGHT: 119.49 LBS | HEIGHT: 59 IN | RESPIRATION RATE: 16 BRPM | DIASTOLIC BLOOD PRESSURE: 56 MMHG | OXYGEN SATURATION: 100 %

## 2018-11-02 DIAGNOSIS — I25.9 CHRONIC ISCHEMIC HEART DISEASE, UNSPECIFIED: ICD-10-CM

## 2018-11-02 DIAGNOSIS — Z95.1 PRESENCE OF AORTOCORONARY BYPASS GRAFT: Chronic | ICD-10-CM

## 2018-11-02 LAB
BUN SERPL-MCNC: 34 MG/DL — HIGH (ref 7–23)
CALCIUM SERPL-MCNC: 9.7 MG/DL — SIGNIFICANT CHANGE UP (ref 8.4–10.5)
CHLORIDE SERPL-SCNC: 100 MMOL/L — SIGNIFICANT CHANGE UP (ref 98–107)
CO2 SERPL-SCNC: 22 MMOL/L — SIGNIFICANT CHANGE UP (ref 22–31)
CREAT SERPL-MCNC: 1.5 MG/DL — HIGH (ref 0.5–1.3)
GLUCOSE BLDC GLUCOMTR-MCNC: 133 MG/DL — HIGH (ref 70–99)
GLUCOSE SERPL-MCNC: 133 MG/DL — HIGH (ref 70–99)
HBA1C BLD-MCNC: 7.8 % — HIGH (ref 4–5.6)
HCT VFR BLD CALC: 37.9 % — SIGNIFICANT CHANGE UP (ref 34.5–45)
HGB BLD-MCNC: 12.5 G/DL — SIGNIFICANT CHANGE UP (ref 11.5–15.5)
MCHC RBC-ENTMCNC: 29.3 PG — SIGNIFICANT CHANGE UP (ref 27–34)
MCHC RBC-ENTMCNC: 33 % — SIGNIFICANT CHANGE UP (ref 32–36)
MCV RBC AUTO: 88.8 FL — SIGNIFICANT CHANGE UP (ref 80–100)
NRBC # FLD: 0 — SIGNIFICANT CHANGE UP
PLATELET # BLD AUTO: 260 K/UL — SIGNIFICANT CHANGE UP (ref 150–400)
PMV BLD: 8.9 FL — SIGNIFICANT CHANGE UP (ref 7–13)
POTASSIUM SERPL-MCNC: 3.4 MMOL/L — LOW (ref 3.5–5.3)
POTASSIUM SERPL-MCNC: SIGNIFICANT CHANGE UP MMOL/L (ref 3.5–5.3)
POTASSIUM SERPL-SCNC: 3.4 MMOL/L — LOW (ref 3.5–5.3)
POTASSIUM SERPL-SCNC: SIGNIFICANT CHANGE UP MMOL/L (ref 3.5–5.3)
RBC # BLD: 4.27 M/UL — SIGNIFICANT CHANGE UP (ref 3.8–5.2)
RBC # FLD: 15.1 % — HIGH (ref 10.3–14.5)
SODIUM SERPL-SCNC: 134 MMOL/L — LOW (ref 135–145)
WBC # BLD: 7.98 K/UL — SIGNIFICANT CHANGE UP (ref 3.8–10.5)
WBC # FLD AUTO: 7.98 K/UL — SIGNIFICANT CHANGE UP (ref 3.8–10.5)

## 2018-11-02 PROCEDURE — 93010 ELECTROCARDIOGRAM REPORT: CPT

## 2018-11-02 PROCEDURE — 93459 L HRT ART/GRFT ANGIO: CPT | Mod: 26

## 2018-11-02 PROCEDURE — 76937 US GUIDE VASCULAR ACCESS: CPT | Mod: 26

## 2018-11-02 RX ORDER — DEXTROSE 50 % IN WATER 50 %
12.5 SYRINGE (ML) INTRAVENOUS ONCE
Qty: 0 | Refills: 0 | Status: DISCONTINUED | OUTPATIENT
Start: 2018-11-02 | End: 2018-11-08

## 2018-11-02 RX ORDER — HEPARIN SODIUM 5000 [USP'U]/ML
5000 INJECTION INTRAVENOUS; SUBCUTANEOUS EVERY 12 HOURS
Qty: 0 | Refills: 0 | Status: DISCONTINUED | OUTPATIENT
Start: 2018-11-03 | End: 2018-11-08

## 2018-11-02 RX ORDER — SODIUM CHLORIDE 9 MG/ML
1000 INJECTION, SOLUTION INTRAVENOUS
Qty: 0 | Refills: 0 | Status: DISCONTINUED | OUTPATIENT
Start: 2018-11-02 | End: 2018-11-08

## 2018-11-02 RX ORDER — PANTOPRAZOLE SODIUM 20 MG/1
40 TABLET, DELAYED RELEASE ORAL
Qty: 0 | Refills: 0 | Status: DISCONTINUED | OUTPATIENT
Start: 2018-11-02 | End: 2018-11-08

## 2018-11-02 RX ORDER — POTASSIUM CHLORIDE 20 MEQ
40 PACKET (EA) ORAL EVERY 4 HOURS
Qty: 0 | Refills: 0 | Status: COMPLETED | OUTPATIENT
Start: 2018-11-02 | End: 2018-11-02

## 2018-11-02 RX ORDER — TICAGRELOR 90 MG/1
90 TABLET ORAL
Qty: 0 | Refills: 0 | Status: DISCONTINUED | OUTPATIENT
Start: 2018-11-02 | End: 2018-11-08

## 2018-11-02 RX ORDER — DEXTROSE 50 % IN WATER 50 %
15 SYRINGE (ML) INTRAVENOUS ONCE
Qty: 0 | Refills: 0 | Status: DISCONTINUED | OUTPATIENT
Start: 2018-11-02 | End: 2018-11-08

## 2018-11-02 RX ORDER — ATORVASTATIN CALCIUM 80 MG/1
40 TABLET, FILM COATED ORAL AT BEDTIME
Qty: 0 | Refills: 0 | Status: DISCONTINUED | OUTPATIENT
Start: 2018-11-02 | End: 2018-11-08

## 2018-11-02 RX ORDER — METOPROLOL TARTRATE 50 MG
25 TABLET ORAL
Qty: 0 | Refills: 0 | Status: DISCONTINUED | OUTPATIENT
Start: 2018-11-02 | End: 2018-11-08

## 2018-11-02 RX ORDER — TICAGRELOR 90 MG/1
90 TABLET ORAL ONCE
Qty: 0 | Refills: 0 | Status: COMPLETED | OUTPATIENT
Start: 2018-11-02 | End: 2018-11-02

## 2018-11-02 RX ORDER — MONTELUKAST 4 MG/1
10 TABLET, CHEWABLE ORAL AT BEDTIME
Qty: 0 | Refills: 0 | Status: DISCONTINUED | OUTPATIENT
Start: 2018-11-02 | End: 2018-11-08

## 2018-11-02 RX ORDER — INSULIN GLARGINE 100 [IU]/ML
65 INJECTION, SOLUTION SUBCUTANEOUS AT BEDTIME
Qty: 0 | Refills: 0 | Status: DISCONTINUED | OUTPATIENT
Start: 2018-11-02 | End: 2018-11-08

## 2018-11-02 RX ORDER — FERROUS SULFATE 325(65) MG
325 TABLET ORAL DAILY
Qty: 0 | Refills: 0 | Status: DISCONTINUED | OUTPATIENT
Start: 2018-11-02 | End: 2018-11-08

## 2018-11-02 RX ORDER — DEXTROSE 50 % IN WATER 50 %
25 SYRINGE (ML) INTRAVENOUS ONCE
Qty: 0 | Refills: 0 | Status: DISCONTINUED | OUTPATIENT
Start: 2018-11-02 | End: 2018-11-08

## 2018-11-02 RX ORDER — INFLUENZA VIRUS VACCINE 15; 15; 15; 15 UG/.5ML; UG/.5ML; UG/.5ML; UG/.5ML
0.5 SUSPENSION INTRAMUSCULAR ONCE
Qty: 0 | Refills: 0 | Status: DISCONTINUED | OUTPATIENT
Start: 2018-11-02 | End: 2018-11-08

## 2018-11-02 RX ORDER — FUROSEMIDE 40 MG
40 TABLET ORAL DAILY
Qty: 0 | Refills: 0 | Status: DISCONTINUED | OUTPATIENT
Start: 2018-11-02 | End: 2018-11-02

## 2018-11-02 RX ORDER — SODIUM CHLORIDE 9 MG/ML
3 INJECTION INTRAMUSCULAR; INTRAVENOUS; SUBCUTANEOUS EVERY 8 HOURS
Qty: 0 | Refills: 0 | Status: DISCONTINUED | OUTPATIENT
Start: 2018-11-02 | End: 2018-11-08

## 2018-11-02 RX ORDER — LEVOTHYROXINE SODIUM 125 MCG
50 TABLET ORAL DAILY
Qty: 0 | Refills: 0 | Status: DISCONTINUED | OUTPATIENT
Start: 2018-11-03 | End: 2018-11-08

## 2018-11-02 RX ORDER — FUROSEMIDE 40 MG
40 TABLET ORAL EVERY 12 HOURS
Qty: 0 | Refills: 0 | Status: DISCONTINUED | OUTPATIENT
Start: 2018-11-02 | End: 2018-11-05

## 2018-11-02 RX ORDER — INSULIN LISPRO 100/ML
30 VIAL (ML) SUBCUTANEOUS
Qty: 0 | Refills: 0 | Status: DISCONTINUED | OUTPATIENT
Start: 2018-11-02 | End: 2018-11-08

## 2018-11-02 RX ORDER — ASPIRIN/CALCIUM CARB/MAGNESIUM 324 MG
81 TABLET ORAL AT BEDTIME
Qty: 0 | Refills: 0 | Status: DISCONTINUED | OUTPATIENT
Start: 2018-11-02 | End: 2018-11-08

## 2018-11-02 RX ORDER — GLUCAGON INJECTION, SOLUTION 0.5 MG/.1ML
1 INJECTION, SOLUTION SUBCUTANEOUS ONCE
Qty: 0 | Refills: 0 | Status: DISCONTINUED | OUTPATIENT
Start: 2018-11-02 | End: 2018-11-08

## 2018-11-02 RX ADMIN — Medication 40 MILLIGRAM(S): at 21:40

## 2018-11-02 RX ADMIN — PANTOPRAZOLE SODIUM 40 MILLIGRAM(S): 20 TABLET, DELAYED RELEASE ORAL at 21:40

## 2018-11-02 RX ADMIN — SODIUM CHLORIDE 3 MILLILITER(S): 9 INJECTION INTRAMUSCULAR; INTRAVENOUS; SUBCUTANEOUS at 17:46

## 2018-11-02 RX ADMIN — Medication 40 MILLIEQUIVALENT(S): at 21:39

## 2018-11-02 RX ADMIN — Medication 40 MILLIEQUIVALENT(S): at 18:32

## 2018-11-02 RX ADMIN — Medication 25 MILLIGRAM(S): at 21:39

## 2018-11-02 RX ADMIN — ATORVASTATIN CALCIUM 40 MILLIGRAM(S): 80 TABLET, FILM COATED ORAL at 21:39

## 2018-11-02 RX ADMIN — SODIUM CHLORIDE 3 MILLILITER(S): 9 INJECTION INTRAMUSCULAR; INTRAVENOUS; SUBCUTANEOUS at 21:40

## 2018-11-02 RX ADMIN — TICAGRELOR 90 MILLIGRAM(S): 90 TABLET ORAL at 21:39

## 2018-11-02 RX ADMIN — Medication 81 MILLIGRAM(S): at 21:39

## 2018-11-02 RX ADMIN — Medication 325 MILLIGRAM(S): at 21:39

## 2018-11-02 RX ADMIN — INSULIN GLARGINE 65 UNIT(S): 100 INJECTION, SOLUTION SUBCUTANEOUS at 23:02

## 2018-11-02 RX ADMIN — TICAGRELOR 90 MILLIGRAM(S): 90 TABLET ORAL at 12:53

## 2018-11-02 RX ADMIN — Medication 30 UNIT(S): at 19:13

## 2018-11-02 RX ADMIN — MONTELUKAST 10 MILLIGRAM(S): 4 TABLET, CHEWABLE ORAL at 21:39

## 2018-11-02 NOTE — H&P CARDIOLOGY - HISTORY OF PRESENT ILLNESS
73 y/o F with a PMHx of CAD S/P CABG and stent, ischemic cardiomyopathy with moderate LV dysfunction (EF 37%), HTN, HLD, DM, hypothyroidism and GERD presents for cardiac catheretization. Pt states that she has been experiencing intermittent chest heaviness and was sent for a stress test by Dr. Cotto. Pt reports having an abnormal stress test but results have not been provided. Pt denies N/V/D, fevers, chills, cough, palpitations, syncope, dyspnea on exertion, orthopnea, nocturnal paroxysmal dyspnea, edema, cyanosis, heart murmurs, varicosities, phlebitis, claudication.

## 2018-11-02 NOTE — H&P CARDIOLOGY - NEGATIVE CARDIOVASCULAR SYMPTOMS
no paroxysmal nocturnal dyspnea/no palpitations/no peripheral edema/no claudication/no dyspnea on exertion/no orthopnea

## 2018-11-02 NOTE — H&P CARDIOLOGY - RS GEN PE MLT RESP DETAILS PC
no chest wall tenderness/breath sounds equal/respirations non-labored/good air movement/airway patent/clear to auscultation bilaterally

## 2018-11-02 NOTE — CHART NOTE - NSCHARTNOTEFT_GEN_A_CORE
Patient is s/p cardiac cath via right femoral access.  Site is stable with no hematoma, active bleed or swelling.  Dressing is clean/dry/intact.  DP pulse is palpable.  Patient without complaints.  Continue to monitor.

## 2018-11-03 DIAGNOSIS — E11.9 TYPE 2 DIABETES MELLITUS WITHOUT COMPLICATIONS: ICD-10-CM

## 2018-11-03 DIAGNOSIS — I10 ESSENTIAL (PRIMARY) HYPERTENSION: ICD-10-CM

## 2018-11-03 DIAGNOSIS — E78.5 HYPERLIPIDEMIA, UNSPECIFIED: ICD-10-CM

## 2018-11-03 DIAGNOSIS — E03.9 HYPOTHYROIDISM, UNSPECIFIED: ICD-10-CM

## 2018-11-03 LAB
BUN SERPL-MCNC: 34 MG/DL — HIGH (ref 7–23)
CALCIUM SERPL-MCNC: 10 MG/DL — SIGNIFICANT CHANGE UP (ref 8.4–10.5)
CHLORIDE SERPL-SCNC: 108 MMOL/L — HIGH (ref 98–107)
CO2 SERPL-SCNC: 21 MMOL/L — LOW (ref 22–31)
CREAT SERPL-MCNC: 1.82 MG/DL — HIGH (ref 0.5–1.3)
GLUCOSE SERPL-MCNC: 79 MG/DL — SIGNIFICANT CHANGE UP (ref 70–99)
HCT VFR BLD CALC: 37.3 % — SIGNIFICANT CHANGE UP (ref 34.5–45)
HGB BLD-MCNC: 12 G/DL — SIGNIFICANT CHANGE UP (ref 11.5–15.5)
MAGNESIUM SERPL-MCNC: 2.2 MG/DL — SIGNIFICANT CHANGE UP (ref 1.6–2.6)
MCHC RBC-ENTMCNC: 28.9 PG — SIGNIFICANT CHANGE UP (ref 27–34)
MCHC RBC-ENTMCNC: 32.2 % — SIGNIFICANT CHANGE UP (ref 32–36)
MCV RBC AUTO: 89.9 FL — SIGNIFICANT CHANGE UP (ref 80–100)
NRBC # FLD: 0 — SIGNIFICANT CHANGE UP
PLATELET # BLD AUTO: 261 K/UL — SIGNIFICANT CHANGE UP (ref 150–400)
PMV BLD: 9 FL — SIGNIFICANT CHANGE UP (ref 7–13)
POTASSIUM SERPL-MCNC: 4.5 MMOL/L — SIGNIFICANT CHANGE UP (ref 3.5–5.3)
POTASSIUM SERPL-SCNC: 4.5 MMOL/L — SIGNIFICANT CHANGE UP (ref 3.5–5.3)
RBC # BLD: 4.15 M/UL — SIGNIFICANT CHANGE UP (ref 3.8–5.2)
RBC # FLD: 15.2 % — HIGH (ref 10.3–14.5)
SODIUM SERPL-SCNC: 144 MMOL/L — SIGNIFICANT CHANGE UP (ref 135–145)
WBC # BLD: 9.98 K/UL — SIGNIFICANT CHANGE UP (ref 3.8–10.5)
WBC # FLD AUTO: 9.98 K/UL — SIGNIFICANT CHANGE UP (ref 3.8–10.5)

## 2018-11-03 PROCEDURE — 99223 1ST HOSP IP/OBS HIGH 75: CPT

## 2018-11-03 RX ADMIN — Medication 25 MILLIGRAM(S): at 06:12

## 2018-11-03 RX ADMIN — MONTELUKAST 10 MILLIGRAM(S): 4 TABLET, CHEWABLE ORAL at 21:10

## 2018-11-03 RX ADMIN — SODIUM CHLORIDE 3 MILLILITER(S): 9 INJECTION INTRAMUSCULAR; INTRAVENOUS; SUBCUTANEOUS at 21:10

## 2018-11-03 RX ADMIN — HEPARIN SODIUM 5000 UNIT(S): 5000 INJECTION INTRAVENOUS; SUBCUTANEOUS at 17:06

## 2018-11-03 RX ADMIN — Medication 30 UNIT(S): at 18:14

## 2018-11-03 RX ADMIN — HEPARIN SODIUM 5000 UNIT(S): 5000 INJECTION INTRAVENOUS; SUBCUTANEOUS at 06:12

## 2018-11-03 RX ADMIN — PANTOPRAZOLE SODIUM 40 MILLIGRAM(S): 20 TABLET, DELAYED RELEASE ORAL at 06:12

## 2018-11-03 RX ADMIN — Medication 81 MILLIGRAM(S): at 21:10

## 2018-11-03 RX ADMIN — Medication 40 MILLIGRAM(S): at 17:06

## 2018-11-03 RX ADMIN — SODIUM CHLORIDE 3 MILLILITER(S): 9 INJECTION INTRAMUSCULAR; INTRAVENOUS; SUBCUTANEOUS at 13:29

## 2018-11-03 RX ADMIN — Medication 40 MILLIGRAM(S): at 07:56

## 2018-11-03 RX ADMIN — TICAGRELOR 90 MILLIGRAM(S): 90 TABLET ORAL at 17:05

## 2018-11-03 RX ADMIN — Medication 325 MILLIGRAM(S): at 17:06

## 2018-11-03 RX ADMIN — INSULIN GLARGINE 65 UNIT(S): 100 INJECTION, SOLUTION SUBCUTANEOUS at 22:14

## 2018-11-03 RX ADMIN — Medication 25 MILLIGRAM(S): at 17:06

## 2018-11-03 RX ADMIN — SODIUM CHLORIDE 3 MILLILITER(S): 9 INJECTION INTRAMUSCULAR; INTRAVENOUS; SUBCUTANEOUS at 06:09

## 2018-11-03 RX ADMIN — ATORVASTATIN CALCIUM 40 MILLIGRAM(S): 80 TABLET, FILM COATED ORAL at 21:10

## 2018-11-03 RX ADMIN — Medication 50 MICROGRAM(S): at 06:12

## 2018-11-03 RX ADMIN — Medication 30 UNIT(S): at 09:16

## 2018-11-03 RX ADMIN — TICAGRELOR 90 MILLIGRAM(S): 90 TABLET ORAL at 06:12

## 2018-11-03 RX ADMIN — Medication 30 UNIT(S): at 13:33

## 2018-11-03 NOTE — CONSULT NOTE ADULT - SUBJECTIVE AND OBJECTIVE BOX
Patient seen and evaluated @   Chief Complaint:  ZEE    HPI:  71 y/o F with a PMHx of CAD S/P CABG and stent, ischemic cardiomyopathy with moderate LV dysfunction (EF 37%), HTN, HLD, DM, hypothyroidism and GERD presents for cardiac catheretization. Pt states that she has been experiencing intermittent chest heaviness and was sent for a stress test by Dr. Cotto. Pt reports having an abnormal stress test but results have not been provided. Pt denies N/V/D, fevers, chills, cough, palpitations, syncope orthopnea, nocturnal paroxysmal dyspnea, edema, cyanosis, heart murmurs, varicosities, phlebitis, claudication. (02 Nov 2018 12:05)    PMH:   Urinary tract infection  GERD (gastroesophageal reflux disease)  Hypertension  CAD (coronary artery disease)  Hypothyroidism  Hyperlipidemia  Diabetes mellitus, type 2  Diabetes mellitus type 2 in nonobese    PSH:   S/P CABG (coronary artery bypass graft)    Medications:   aspirin enteric coated 81 milliGRAM(s) Oral at bedtime  atorvastatin 40 milliGRAM(s) Oral at bedtime  dextrose 40% Gel 15 Gram(s) Oral once PRN  dextrose 5%. 1000 milliLiter(s) IV Continuous <Continuous>  dextrose 50% Injectable 12.5 Gram(s) IV Push once  dextrose 50% Injectable 25 Gram(s) IV Push once  dextrose 50% Injectable 25 Gram(s) IV Push once  ferrous    sulfate 325 milliGRAM(s) Oral daily  furosemide   Injectable 40 milliGRAM(s) IV Push every 12 hours  glucagon  Injectable 1 milliGRAM(s) IntraMuscular once PRN  heparin  Injectable 5000 Unit(s) SubCutaneous every 12 hours  influenza   Vaccine 0.5 milliLiter(s) IntraMuscular once  insulin glargine Injectable (LANTUS) 65 Unit(s) SubCutaneous at bedtime  insulin lispro Injectable (HumaLOG) 30 Unit(s) SubCutaneous three times a day before meals  levothyroxine 50 MICROGram(s) Oral daily  metoprolol tartrate 25 milliGRAM(s) Oral two times a day  montelukast 10 milliGRAM(s) Oral at bedtime  pantoprazole    Tablet 40 milliGRAM(s) Oral before breakfast  sodium chloride 0.9% lock flush 3 milliLiter(s) IV Push every 8 hours  ticagrelor 90 milliGRAM(s) Oral two times a day    Allergies:  No Known Allergies    FAMILY HISTORY:  Family history of hypertension (Sibling): sister  Family history of diabetes mellitus (Sibling): brothers/sisters    Social History:  Smoking:  Alcohol:  Drugs:    Review of Systems:  [ ]Unable to obtain  Constitutional: No fever, chills, fatigue, or changes in weight  HEENT: No blurry vision, eye pain, headache, runny nose, or sore throat  Respiratory: No shortness of breath, cough, or wheezing  Cardiovascular: No chest pain or palpitations  Gastrointestinal: No nausea, vomiting, diarrhea, or abdominal pain  Genitourinary: No dysuria or incontinence  Extremities: No lower extremity swelling  Neurologic: No focal findings  Lymphatic: No lymphadenopathy   Skin: No rash  Psychiatry: No anxiety or depression  10 point review of systems is otherwise negative except as mentioned above            Physical Exam:  T(C): 36.6 (11-03-18 @ 06:09), Max: 36.6 (11-03-18 @ 06:09)  HR: 72 (11-03-18 @ 06:09) (72 - 77)  BP: 104/56 (11-03-18 @ 06:09) (104/56 - 123/56)  RR: 16 (11-03-18 @ 06:09) (16 - 16)  SpO2: 98% (11-03-18 @ 06:09) (95% - 100%)  Wt(kg): --    Daily Height in cm: 149.86 (02 Nov 2018 18:20)    Daily     Appearance: Normal, well groomed, NAD  Eyes: PERRLA, EOMI, pink conjunctiva, no scleral icterus   HENT: Normal oral mucosa  Cardiovascular: RRR, S1, S2, no murmur, rub, or gallop; no edema; no JVD  Respiratory: Clear to auscultation bilaterally  Gastrointestinal: Soft, non-tender, non-distended, BS+  Musculoskeletal: No clubbing or joint deformity   Neurologic: No focal weakness  Lymphatic: No lymphadenopathy  Psychiatry: AAOx3 with appropriate mood and affect  Skin: No rashes, ecchymoses, or cyanosis    Cardiovascular Diagnostic Testing:  ECG: NSR Septal MI    Echo:    Stress Testing:    Cath: CAD High EDP( 31-32)    Interpretation of Telemetry: NSR    Imaging:    Labs:                        12.5   7.98  )-----------( 260      ( 02 Nov 2018 12:00 )             37.9     11-02    x   |  x   |  x   ----------------------------<  x   3.4<L>   |  x   |  x     Ca    9.7      02 Nov 2018 12:00                Hemoglobin A1C, Whole Blood: 7.8 % (11-02 @ 12:00)
HPI:  73 y/o F with a PMHx of CAD S/P CABG and stent, ischemic cardiomyopathy with moderate LV dysfunction (EF 37%), HTN, HLD, DM, hypothyroidism and GERD presents for cardiac catheretization. Pt states that she has been experiencing intermittent chest heaviness and was sent for a stress test by Dr. Cotto. Pt reports having an abnormal stress test but results have not been provided. Pt denies N/V/D, fevers, chills, cough, palpitations, syncope, dyspnea on exertion, orthopnea, nocturnal paroxysmal dyspnea, edema, cyanosis, heart murmurs, varicosities, phlebitis, claudication. (02 Nov 2018 12:05)  Patient has history of diabetes, on large dose basal bolus insulin at home, no recent hypoglycemic episodes, no polyuria polydipsia. Patient follows up with PCP for diabetes management.    PAST MEDICAL & SURGICAL HISTORY:  GERD (gastroesophageal reflux disease)  CAD (coronary artery disease): s/p CABG  Hypothyroidism  Hyperlipidemia  Diabetes mellitus, type 2  S/P CABG (coronary artery bypass graft)      FAMILY HISTORY:  Family history of hypertension (Sibling): sister  Family history of diabetes mellitus (Sibling): brothers/sisters      Social History:    Outpatient Medications:    MEDICATIONS  (STANDING):  aspirin enteric coated 81 milliGRAM(s) Oral at bedtime  atorvastatin 40 milliGRAM(s) Oral at bedtime  dextrose 5%. 1000 milliLiter(s) (50 mL/Hr) IV Continuous <Continuous>  dextrose 50% Injectable 12.5 Gram(s) IV Push once  dextrose 50% Injectable 25 Gram(s) IV Push once  dextrose 50% Injectable 25 Gram(s) IV Push once  ferrous    sulfate 325 milliGRAM(s) Oral daily  furosemide   Injectable 40 milliGRAM(s) IV Push every 12 hours  heparin  Injectable 5000 Unit(s) SubCutaneous every 12 hours  influenza   Vaccine 0.5 milliLiter(s) IntraMuscular once  insulin glargine Injectable (LANTUS) 65 Unit(s) SubCutaneous at bedtime  insulin lispro Injectable (HumaLOG) 30 Unit(s) SubCutaneous three times a day before meals  levothyroxine 50 MICROGram(s) Oral daily  metoprolol tartrate 25 milliGRAM(s) Oral two times a day  montelukast 10 milliGRAM(s) Oral at bedtime  pantoprazole    Tablet 40 milliGRAM(s) Oral before breakfast  sodium chloride 0.9% lock flush 3 milliLiter(s) IV Push every 8 hours  ticagrelor 90 milliGRAM(s) Oral two times a day    MEDICATIONS  (PRN):  dextrose 40% Gel 15 Gram(s) Oral once PRN Blood Glucose LESS THAN 70 milliGRAM(s)/deciliter  glucagon  Injectable 1 milliGRAM(s) IntraMuscular once PRN Glucose LESS THAN 70 milligrams/deciliter      Allergies    No Known Allergies    Intolerances      Review of Systems:  Constitutional: No fever, no chills  Eyes: No blurry vision  Neuro: No tremors  HEENT: No pain, no neck swelling  Cardiovascular: No chest pain, no palpitations  Respiratory: Has SOB, no cough  GI: No nausea, vomiting, abdominal pain  : No dysuria  Skin: no rash  MSK: Has leg swelling.  Psych: no depression  Endocrine: no polyuria, polydipsia    ALL OTHER SYSTEMS REVIEWED AND NEGATIVE    UNABLE TO OBTAIN    PHYSICAL EXAM:  VITALS: T(C): 36.3 (11-03-18 @ 13:32)  T(F): 97.3 (11-03-18 @ 13:32), Max: 97.9 (11-03-18 @ 06:09)  HR: 73 (11-03-18 @ 13:32) (72 - 77)  BP: 113/57 (11-03-18 @ 13:32) (104/56 - 123/56)  RR:  (16 - 18)  SpO2:  (95% - 100%)  Wt(kg): --  GENERAL: NAD, well-groomed, well-developed  EYES: No proptosis, no lid lag  HEENT:  Atraumatic, Normocephalic  THYROID: Normal size, no palpable nodules  RESPIRATORY: Clear to auscultation bilaterally; No rales, rhonchi, wheezing  CARDIOVASCULAR: Si S2, No murmurs;  GI: Soft, non distended, normal bowel sounds  SKIN: Dry, intact, No rashes or lesions  MUSCULOSKELETAL: Has BL lower extremity edema.  NEURO:  no tremor, sensation decreased in feet BL,    POCT Blood Glucose.: 133 mg/dL (11-02-18 @ 11:57)                            12.0   9.98  )-----------( 261      ( 03 Nov 2018 07:15 )             37.3       11-03    144  |  108<H>  |  34<H>  ----------------------------<  79  4.5   |  21<L>  |  1.82<H>    EGFR if : 32  EGFR if non : 28    Ca    10.0      11-03  Mg     2.2     11-03        Thyroid Function Tests:      Hemoglobin A1C, Whole Blood: 7.8 % <H> [4.0 - 5.6] (11-02-18 @ 12:00)  Hemoglobin A1C, Whole Blood: 7.8 % <H> [4.0 - 5.6] (09-25-18 @ 04:20)  Hemoglobin A1C, Whole Blood: 7.4 % <H> [4.0 - 5.6] (09-02-18 @ 02:30)          Radiology:
Patient seen and examined at bedside  s/p cardiac cath  Case discussed with medical team    HPI:  73 y/o F with a PMHx of CAD S/P CABG and stent, ischemic cardiomyopathy with moderate LV dysfunction (EF 37%), HTN, HLD, DM, hypothyroidism and GERD presents for cardiac catheretization. Pt states that she has been experiencing intermittent chest heaviness and was sent for a stress test by Dr. Cotto. Pt reports having an abnormal stress test but results have not been provided. Pt denies N/V/D, fevers, chills, cough, palpitations, syncope, dyspnea on exertion, orthopnea, nocturnal paroxysmal dyspnea, edema, cyanosis, heart murmurs, varicosities, phlebitis, claudication. (02 Nov 2018 12:05)      PAST MEDICAL & SURGICAL HISTORY:  GERD (gastroesophageal reflux disease)  CAD (coronary artery disease): s/p CABG  Hypothyroidism  Hyperlipidemia  Diabetes mellitus, type 2  S/P CABG (coronary artery bypass graft)      No Known Allergies       MEDICATIONS  (STANDING):  aspirin enteric coated 81 milliGRAM(s) Oral at bedtime  atorvastatin 40 milliGRAM(s) Oral at bedtime  dextrose 5%. 1000 milliLiter(s) (50 mL/Hr) IV Continuous <Continuous>  dextrose 50% Injectable 12.5 Gram(s) IV Push once  dextrose 50% Injectable 25 Gram(s) IV Push once  dextrose 50% Injectable 25 Gram(s) IV Push once  ferrous    sulfate 325 milliGRAM(s) Oral daily  furosemide   Injectable 40 milliGRAM(s) IV Push every 12 hours  heparin  Injectable 5000 Unit(s) SubCutaneous every 12 hours  influenza   Vaccine 0.5 milliLiter(s) IntraMuscular once  insulin glargine Injectable (LANTUS) 65 Unit(s) SubCutaneous at bedtime  insulin lispro Injectable (HumaLOG) 30 Unit(s) SubCutaneous three times a day before meals  levothyroxine 50 MICROGram(s) Oral daily  metoprolol tartrate 25 milliGRAM(s) Oral two times a day  montelukast 10 milliGRAM(s) Oral at bedtime  pantoprazole    Tablet 40 milliGRAM(s) Oral before breakfast  sodium chloride 0.9% lock flush 3 milliLiter(s) IV Push every 8 hours  ticagrelor 90 milliGRAM(s) Oral two times a day    MEDICATIONS  (PRN):  dextrose 40% Gel 15 Gram(s) Oral once PRN Blood Glucose LESS THAN 70 milliGRAM(s)/deciliter  glucagon  Injectable 1 milliGRAM(s) IntraMuscular once PRN Glucose LESS THAN 70 milligrams/deciliter      REVIEW OF SYSTEMS:  CONSTITUTIONAL: (+) malaise.   EYES: No acute change in vision   ENT:  No tinnitus  NECK: No stiffness  RESPIRATORY: pino. No hemoptysis  CARDIOVASCULAR: chest pain  GASTROINTESTINAL: No hematemesis, diarrhea, melena, or hematochezia.  GENITOURINARY: No hematuria  NEUROLOGICAL: No headaches  LYMPH Nodes: No enlarged glands  ENDOCRINE: No heat or cold intolerance	    T(C): 36.6 (11-03-18 @ 06:09), Max: 36.6 (11-03-18 @ 06:09)  HR: 72 (11-03-18 @ 06:09) (72 - 77)  BP: 104/56 (11-03-18 @ 06:09) (104/56 - 123/56)  RR: 16 (11-03-18 @ 06:09) (16 - 16)  SpO2: 98% (11-03-18 @ 06:09) (95% - 100%)    PHYSICAL EXAMINATION:   Constitutional: ill appearing. WD, NAD  HEENT: NC, AT  Neck:  Supple  Respiratory:  Adequate airflow b/l. Not using accessory muscles of respiration.  Cardiovascular:  S1 & S2 intact,systolic murmur, no R/G, 2+ radial pulses b/l  Gastrointestinal: Soft, NT, ND, normoactive b.s., no organomegaly/RT/rigidity  Extremities: WWP  Neurological:  Alert and awake.  No acute focal motor deficits. Crude sensation intact.     Labs and imaging reviewed    LABS:                        12.0   9.98  )-----------( 261      ( 03 Nov 2018 07:15 )             37.3     11-02    x   |  x   |  x   ----------------------------<  x   3.4<L>   |  x   |  x     Ca    9.7      02 Nov 2018 12:00              CAPILLARY BLOOD GLUCOSE  147 (02 Nov 2018 23:01)      POCT Blood Glucose.: 133 mg/dL (02 Nov 2018 11:57)              RADIOLOGY & ADDITIONAL STUDIES:

## 2018-11-03 NOTE — CONSULT NOTE ADULT - ASSESSMENT
CAD  Angina Pectoris  High EDP  Plan:   Diurese ( Lasix 40 IV Q12 H)   PCI next week
71 y/o F with a PMHx of CAD S/P CABG and stent, ischemic cardiomyopathy with moderate LV dysfunction (EF 37%), HTN, HLD, DM, hypothyroidism and GERD presents for cardiac catheretization with c/o chest heaviness and pino    -> History of CAD and Chest Pain:       - S/p cardiac cath      - Plan for PCI ?Monday and stent       - C/w cardiac meds      - Tele      - Cardiology management appreciated   -> CHFrEF:       - C/w cardiac meds   -> DM II:       - C/w ihss. Monitor acks. DM education  -> Need for prophylactic measure: dvt/gi ppx
Assessment  DMT2: 72y Female with DM T2 with hyperglycemia on basal bolus insulin, blood sugars improving, no hypoglycemic episode,  eating meals,  non compliant with low carb diet.  CAD: On medications, stable, monitored.  Hypothyroidism:  On synthroid 50 mcg po daily, asymptomatic.  HTN: Controlled, On med.

## 2018-11-04 LAB
BUN SERPL-MCNC: 46 MG/DL — HIGH (ref 7–23)
CALCIUM SERPL-MCNC: 9.8 MG/DL — SIGNIFICANT CHANGE UP (ref 8.4–10.5)
CHLORIDE SERPL-SCNC: 101 MMOL/L — SIGNIFICANT CHANGE UP (ref 98–107)
CO2 SERPL-SCNC: 22 MMOL/L — SIGNIFICANT CHANGE UP (ref 22–31)
CREAT SERPL-MCNC: 1.93 MG/DL — HIGH (ref 0.5–1.3)
GLUCOSE SERPL-MCNC: 76 MG/DL — SIGNIFICANT CHANGE UP (ref 70–99)
HCT VFR BLD CALC: 35.9 % — SIGNIFICANT CHANGE UP (ref 34.5–45)
HGB BLD-MCNC: 11.8 G/DL — SIGNIFICANT CHANGE UP (ref 11.5–15.5)
MCHC RBC-ENTMCNC: 29.8 PG — SIGNIFICANT CHANGE UP (ref 27–34)
MCHC RBC-ENTMCNC: 32.9 % — SIGNIFICANT CHANGE UP (ref 32–36)
MCV RBC AUTO: 90.7 FL — SIGNIFICANT CHANGE UP (ref 80–100)
NRBC # FLD: 0 — SIGNIFICANT CHANGE UP
PLATELET # BLD AUTO: 245 K/UL — SIGNIFICANT CHANGE UP (ref 150–400)
PMV BLD: 9.2 FL — SIGNIFICANT CHANGE UP (ref 7–13)
POTASSIUM SERPL-MCNC: 3.5 MMOL/L — SIGNIFICANT CHANGE UP (ref 3.5–5.3)
POTASSIUM SERPL-SCNC: 3.5 MMOL/L — SIGNIFICANT CHANGE UP (ref 3.5–5.3)
RBC # BLD: 3.96 M/UL — SIGNIFICANT CHANGE UP (ref 3.8–5.2)
RBC # FLD: 15 % — HIGH (ref 10.3–14.5)
SODIUM SERPL-SCNC: 141 MMOL/L — SIGNIFICANT CHANGE UP (ref 135–145)
WBC # BLD: 9.21 K/UL — SIGNIFICANT CHANGE UP (ref 3.8–10.5)
WBC # FLD AUTO: 9.21 K/UL — SIGNIFICANT CHANGE UP (ref 3.8–10.5)

## 2018-11-04 PROCEDURE — 99233 SBSQ HOSP IP/OBS HIGH 50: CPT

## 2018-11-04 RX ADMIN — SODIUM CHLORIDE 3 MILLILITER(S): 9 INJECTION INTRAMUSCULAR; INTRAVENOUS; SUBCUTANEOUS at 21:08

## 2018-11-04 RX ADMIN — Medication 40 MILLIGRAM(S): at 17:11

## 2018-11-04 RX ADMIN — Medication 30 UNIT(S): at 08:59

## 2018-11-04 RX ADMIN — TICAGRELOR 90 MILLIGRAM(S): 90 TABLET ORAL at 17:11

## 2018-11-04 RX ADMIN — Medication 25 MILLIGRAM(S): at 17:11

## 2018-11-04 RX ADMIN — PANTOPRAZOLE SODIUM 40 MILLIGRAM(S): 20 TABLET, DELAYED RELEASE ORAL at 05:26

## 2018-11-04 RX ADMIN — Medication 50 MICROGRAM(S): at 05:26

## 2018-11-04 RX ADMIN — Medication 30 UNIT(S): at 13:23

## 2018-11-04 RX ADMIN — HEPARIN SODIUM 5000 UNIT(S): 5000 INJECTION INTRAVENOUS; SUBCUTANEOUS at 17:11

## 2018-11-04 RX ADMIN — SODIUM CHLORIDE 3 MILLILITER(S): 9 INJECTION INTRAMUSCULAR; INTRAVENOUS; SUBCUTANEOUS at 14:54

## 2018-11-04 RX ADMIN — TICAGRELOR 90 MILLIGRAM(S): 90 TABLET ORAL at 05:26

## 2018-11-04 RX ADMIN — INSULIN GLARGINE 65 UNIT(S): 100 INJECTION, SOLUTION SUBCUTANEOUS at 21:51

## 2018-11-04 RX ADMIN — Medication 40 MILLIGRAM(S): at 05:26

## 2018-11-04 RX ADMIN — Medication 25 MILLIGRAM(S): at 05:26

## 2018-11-04 RX ADMIN — ATORVASTATIN CALCIUM 40 MILLIGRAM(S): 80 TABLET, FILM COATED ORAL at 21:10

## 2018-11-04 RX ADMIN — MONTELUKAST 10 MILLIGRAM(S): 4 TABLET, CHEWABLE ORAL at 21:10

## 2018-11-04 RX ADMIN — Medication 325 MILLIGRAM(S): at 17:11

## 2018-11-04 RX ADMIN — Medication 30 UNIT(S): at 17:11

## 2018-11-04 RX ADMIN — HEPARIN SODIUM 5000 UNIT(S): 5000 INJECTION INTRAVENOUS; SUBCUTANEOUS at 05:26

## 2018-11-04 RX ADMIN — SODIUM CHLORIDE 3 MILLILITER(S): 9 INJECTION INTRAMUSCULAR; INTRAVENOUS; SUBCUTANEOUS at 05:26

## 2018-11-04 RX ADMIN — Medication 81 MILLIGRAM(S): at 21:10

## 2018-11-04 NOTE — PROGRESS NOTE ADULT - SUBJECTIVE AND OBJECTIVE BOX
Chief complaint  Patient is a 72y old  Female who presents with a chief complaint of Catheterization. (04 Nov 2018 15:42)   Review of systems  Patient in bed, looks comfortable, no fever, no hypoglycemia.    Labs and Fingersticks  CAPILLARY BLOOD GLUCOSE      POCT Blood Glucose.: 191 mg/dL (04 Nov 2018 12:37)  POCT Blood Glucose.: 75 mg/dL (04 Nov 2018 08:30)  POCT Blood Glucose.: 153 mg/dL (03 Nov 2018 22:03)  POCT Blood Glucose.: 124 mg/dL (03 Nov 2018 17:25)          Calcium, Total Serum: 9.8 (11-04 @ 06:44)  Calcium, Total Serum: 10.0 (11-03 @ 07:15)          11-04    141  |  101  |  46<H>  ----------------------------<  76  3.5   |  22  |  1.93<H>    Ca    9.8      04 Nov 2018 06:44  Mg     2.2     11-03                          11.8   9.21  )-----------( 245      ( 04 Nov 2018 06:44 )             35.9     Medications  MEDICATIONS  (STANDING):  aspirin enteric coated 81 milliGRAM(s) Oral at bedtime  atorvastatin 40 milliGRAM(s) Oral at bedtime  dextrose 5%. 1000 milliLiter(s) (50 mL/Hr) IV Continuous <Continuous>  dextrose 50% Injectable 12.5 Gram(s) IV Push once  dextrose 50% Injectable 25 Gram(s) IV Push once  dextrose 50% Injectable 25 Gram(s) IV Push once  ferrous    sulfate 325 milliGRAM(s) Oral daily  furosemide   Injectable 40 milliGRAM(s) IV Push every 12 hours  heparin  Injectable 5000 Unit(s) SubCutaneous every 12 hours  influenza   Vaccine 0.5 milliLiter(s) IntraMuscular once  insulin glargine Injectable (LANTUS) 65 Unit(s) SubCutaneous at bedtime  insulin lispro Injectable (HumaLOG) 30 Unit(s) SubCutaneous three times a day before meals  levothyroxine 50 MICROGram(s) Oral daily  metoprolol tartrate 25 milliGRAM(s) Oral two times a day  montelukast 10 milliGRAM(s) Oral at bedtime  pantoprazole    Tablet 40 milliGRAM(s) Oral before breakfast  sodium chloride 0.9% lock flush 3 milliLiter(s) IV Push every 8 hours  ticagrelor 90 milliGRAM(s) Oral two times a day      Physical Exam  General: Patient comfortable in bed  Vital Signs Last 12 Hrs  T(F): 97.4 (11-04-18 @ 12:54), Max: 97.4 (11-04-18 @ 05:25)  HR: 66 (11-04-18 @ 12:54) (66 - 67)  BP: 106/55 (11-04-18 @ 12:54) (104/55 - 106/55)  BP(mean): --  RR: 18 (11-04-18 @ 12:54) (18 - 18)  SpO2: 100% (11-04-18 @ 12:54) (100% - 100%)  Neck: No palpable thyroid nodules.  CVS: S1S2, No murmurs  Respiratory: No wheezing, no crepitations  GI: Abdomen soft, bowel sounds positive  Musculoskeletal:  edema lower extremities.   Skin: No skin rashes, no ecchymosis    Diagnostics

## 2018-11-04 NOTE — PROGRESS NOTE ADULT - SUBJECTIVE AND OBJECTIVE BOX
CC:  Some chest pains    HPI:  73 y/o F with a PMHx of CAD S/P CABG and stent, ischemic cardiomyopathy with moderate LV dysfunction (EF 37%), HTN, HLD, DM, hypothyroidism and GERD presents for cardiac catheretization. Pt states that she has been experiencing intermittent chest heaviness and was sent for a stress test by Dr. Cotto. Pt reports having an abnormal stress test but results have not been provided. Pt denies N/V/D, fevers, chills, cough, palpitations, syncope, dyspnea on exertion, orthopnea, nocturnal paroxysmal dyspnea, edema, cyanosis, heart murmurs, varicosities, phlebitis, claudication. (02 Nov 2018 12:05)    Review Of Systems:     No shortness of breath, or palpitations            Medications:  aspirin enteric coated 81 milliGRAM(s) Oral at bedtime  atorvastatin 40 milliGRAM(s) Oral at bedtime  dextrose 40% Gel 15 Gram(s) Oral once PRN  dextrose 5%. 1000 milliLiter(s) IV Continuous <Continuous>  dextrose 50% Injectable 12.5 Gram(s) IV Push once  dextrose 50% Injectable 25 Gram(s) IV Push once  dextrose 50% Injectable 25 Gram(s) IV Push once  ferrous    sulfate 325 milliGRAM(s) Oral daily  furosemide   Injectable 40 milliGRAM(s) IV Push every 12 hours  glucagon  Injectable 1 milliGRAM(s) IntraMuscular once PRN  heparin  Injectable 5000 Unit(s) SubCutaneous every 12 hours  influenza   Vaccine 0.5 milliLiter(s) IntraMuscular once  insulin glargine Injectable (LANTUS) 65 Unit(s) SubCutaneous at bedtime  insulin lispro Injectable (HumaLOG) 30 Unit(s) SubCutaneous three times a day before meals  levothyroxine 50 MICROGram(s) Oral daily  metoprolol tartrate 25 milliGRAM(s) Oral two times a day  montelukast 10 milliGRAM(s) Oral at bedtime  pantoprazole    Tablet 40 milliGRAM(s) Oral before breakfast  sodium chloride 0.9% lock flush 3 milliLiter(s) IV Push every 8 hours  ticagrelor 90 milliGRAM(s) Oral two times a day    PAST MEDICAL & SURGICAL HISTORY:  GERD (gastroesophageal reflux disease)  CAD (coronary artery disease): s/p CABG  Hypothyroidism  Hyperlipidemia  Diabetes mellitus, type 2  S/P CABG (coronary artery bypass graft)    Vitals:  T(C): 36.3 (11-04-18 @ 05:25), Max: 36.3 (11-03-18 @ 13:32)  HR: 67 (11-04-18 @ 05:25) (67 - 74)  BP: 104/55 (11-04-18 @ 05:25) (104/55 - 125/89)  BP(mean): --  RR: 18 (11-04-18 @ 05:25) (18 - 18)  SpO2: 100% (11-04-18 @ 05:25) (98% - 100%)  Wt(kg): --  Daily     Daily   I&O's Summary    03 Nov 2018 08:01  -  04 Nov 2018 07:00  --------------------------------------------------------  IN: 240 mL / OUT: 500 mL / NET: -260 mL        Physical Exam:  Appearance: No acute distress; well appearing  Eyes: PERRL, EOMI, pink conjunctiva  HENT: Normal oral mucosa  Cardiovascular: RRR, S1, S2, no murmurs, rubs, or gallops; no edema; no JVD  Respiratory: Clear to auscultation bilaterally  Gastrointestinal: soft, non-tender, non-distended with normal bowel sounds  Musculoskeletal: No clubbing; no joint deformity   Neurologic: Non-focal  Lymphatic: No lymphadenopathy  Psychiatry: AAOx3, mood & affect appropriate  Skin: No rashes, ecchymoses, or cyanosis                          12.0   9.98  )-----------( 261      ( 03 Nov 2018 07:15 )             37.3     11-03    144  |  108<H>  |  34<H>  ----------------------------<  79  4.5   |  21<L>  |  1.82<H>    Ca    10.0      03 Nov 2018 07:15  Mg     2.2     11-03                    ECG:    Echo:    Stress Testing:     Cath:    Imaging:    Interpretation of Telemetry: NSR rare couplets

## 2018-11-04 NOTE — PROGRESS NOTE ADULT - SUBJECTIVE AND OBJECTIVE BOX
SELVIN CLAIRE  HPI:  This is a patient who was sent in coronary angiogram after she had a positive stress test.  She has CKD 3.  HEALTH ISSUES - PROBLEM Dx:  HTN (hypertension): HTN (hypertension)  Hyperlipidemia: Hyperlipidemia  Hypothyroidism: Hypothyroidism  Diabetes mellitus, type 2: Diabetes mellitus, type 2        MEDICATIONS  (STANDING):  aspirin enteric coated 81 milliGRAM(s) Oral at bedtime  atorvastatin 40 milliGRAM(s) Oral at bedtime  dextrose 5%. 1000 milliLiter(s) (50 mL/Hr) IV Continuous <Continuous>  dextrose 50% Injectable 12.5 Gram(s) IV Push once  dextrose 50% Injectable 25 Gram(s) IV Push once  dextrose 50% Injectable 25 Gram(s) IV Push once  ferrous    sulfate 325 milliGRAM(s) Oral daily  furosemide   Injectable 40 milliGRAM(s) IV Push every 12 hours  heparin  Injectable 5000 Unit(s) SubCutaneous every 12 hours  influenza   Vaccine 0.5 milliLiter(s) IntraMuscular once  insulin glargine Injectable (LANTUS) 65 Unit(s) SubCutaneous at bedtime  insulin lispro Injectable (HumaLOG) 30 Unit(s) SubCutaneous three times a day before meals  levothyroxine 50 MICROGram(s) Oral daily  metoprolol tartrate 25 milliGRAM(s) Oral two times a day  montelukast 10 milliGRAM(s) Oral at bedtime  pantoprazole    Tablet 40 milliGRAM(s) Oral before breakfast  sodium chloride 0.9% lock flush 3 milliLiter(s) IV Push every 8 hours  ticagrelor 90 milliGRAM(s) Oral two times a day    MEDICATIONS  (PRN):  dextrose 40% Gel 15 Gram(s) Oral once PRN Blood Glucose LESS THAN 70 milliGRAM(s)/deciliter  glucagon  Injectable 1 milliGRAM(s) IntraMuscular once PRN Glucose LESS THAN 70 milligrams/deciliter    Vital Signs Last 24 Hrs  T(C): 36.3 (2018 12:54), Max: 36.3 (2018 21:08)  T(F): 97.4 (2018 12:54), Max: 97.4 (2018 05:25)  HR: 66 (2018 12:54) (66 - 74)  BP: 106/55 (2018 12:54) (104/55 - 125/89)  BP(mean): --  RR: 18 (2018 12:54) (18 - 18)  SpO2: 100% (2018 12:54) (98% - 100%)  Daily     Daily Weight in k.3 (2018 06:20)    PHYSICAL EXAM:  Constitutional:  She appears comfortable and not distressed. Not diaphoretic.    Neck:  The thyroid is normal. Trachea is midline.     Breasts: Normal examination.    Respiratory: The lungs are clear to auscultation. No dullness and expansion is normal.    Cardiovascular: S1 and S2 are normal. No mummurs, rubs or gallops are present.    Gastrointestinal: The abdomen is soft. No tenderness is present. No masses are present. Bowel sounds are normal.    Genitourinary: The bladder is not distended. No CVA tenderness is present.    Extremities: No edema is noted. No deformities are present.    Neurological: Cognition is normal. Tone, power and sensation are normal. Gait is steady.    Skin: No lesions are seen  or palpated.    Lymph Nodes: No lymphadenopathy is present.    Psychiatric: Mood is appropriate. No hallucinations or flight of ideas are noted.                              11.8   9.21  )-----------( 245      ( 2018 06:44 )             35.9     11-04    141  |  101  |  46<H>  ----------------------------<  76  3.5   |  22  |  1.93<H>    Ca    9.8      2018 06:44  Mg     2.2     11-03    Urinalysis (18 @ 04:29)    Color: YELLOW    Urine Appearance: CLEAR    Glucose: 250    Bilirubin: NEGATIVE    Ketone - Urine: NEGATIVE    Specific Gravity: 1.015    Blood: MODERATE    pH - Urine: 6.5    Protein, Urine: MODERATE    Urobilinogen: NORMAL    Nitrite: NEGATIVE    Leukocyte Esterase Concentration: NEGATIVE    Red Blood Cell - Urine: 5-10    White Blood Cell - Urine: 2-5    Mucus: FEW    Squamous Epithelial: OCC.

## 2018-11-04 NOTE — PROGRESS NOTE ADULT - ASSESSMENT
1.	CKD 3, possible due to Diabetic Nephropathy.  2.	CAD, possible for catheterization. low risk for RICN but will benifit from NS bolus.    PLAN:  1.	Continue diuretics for now but hold before angiogram.  2.	 ml bolus before proceedure and 50 ml/hour for 4 hours after.  3.	Hollow up BMP.

## 2018-11-04 NOTE — PROGRESS NOTE ADULT - ASSESSMENT
73 y/o F with a PMHx of CAD S/P CABG and stent, ischemic cardiomyopathy with moderate LV dysfunction (EF 37%), HTN, HLD, DM, hypothyroidism and GERD presents for cardiac catheretization with c/o chest heaviness and pino    -> History of CAD and Chest Pain:       - Plan for PCI Monday +/- stent, npo after midnight       - C/w cardiac meds      - Tele      - Cardiology management appreciated   -> CHFrEF:       - C/w cardiac meds   -> DM II:       - C/w ihss. Monitor acks. DM education  -> Need for prophylactic measure: dvt/gi ppx

## 2018-11-04 NOTE — PROGRESS NOTE ADULT - ASSESSMENT
Assessment  DMT2: 72y Female with DM T2 with hyperglycemia on basal bolus insulin, blood sugars improving, no hypoglycemic episode,  eating meals,  non compliant with low carb diet.  CAD: On medications, stable, monitored.  Hypothyroidism:  On synthroid 50 mcg po daily, asymptomatic.  HTN: Controlled, On med.

## 2018-11-04 NOTE — PROGRESS NOTE ADULT - SUBJECTIVE AND OBJECTIVE BOX
Patient seen and examined at bedside  No acute events noted overnight  Case discussed with medical team    HPI:  73 y/o F with a PMHx of CAD S/P CABG and stent, ischemic cardiomyopathy with moderate LV dysfunction (EF 37%), HTN, HLD, DM, hypothyroidism and GERD presents for cardiac catheretization. Pt states that she has been experiencing intermittent chest heaviness and was sent for a stress test by Dr. Cotto. Pt reports having an abnormal stress test but results have not been provided. Pt denies N/V/D, fevers, chills, cough, palpitations, syncope, dyspnea on exertion, orthopnea, nocturnal paroxysmal dyspnea, edema, cyanosis, heart murmurs, varicosities, phlebitis, claudication. (02 Nov 2018 12:05)      PAST MEDICAL & SURGICAL HISTORY:  GERD (gastroesophageal reflux disease)  CAD (coronary artery disease): s/p CABG  Hypothyroidism  Hyperlipidemia  Diabetes mellitus, type 2  S/P CABG (coronary artery bypass graft)      No Known Allergies       MEDICATIONS  (STANDING):  aspirin enteric coated 81 milliGRAM(s) Oral at bedtime  atorvastatin 40 milliGRAM(s) Oral at bedtime  dextrose 5%. 1000 milliLiter(s) (50 mL/Hr) IV Continuous <Continuous>  dextrose 50% Injectable 12.5 Gram(s) IV Push once  dextrose 50% Injectable 25 Gram(s) IV Push once  dextrose 50% Injectable 25 Gram(s) IV Push once  ferrous    sulfate 325 milliGRAM(s) Oral daily  furosemide   Injectable 40 milliGRAM(s) IV Push every 12 hours  heparin  Injectable 5000 Unit(s) SubCutaneous every 12 hours  influenza   Vaccine 0.5 milliLiter(s) IntraMuscular once  insulin glargine Injectable (LANTUS) 65 Unit(s) SubCutaneous at bedtime  insulin lispro Injectable (HumaLOG) 30 Unit(s) SubCutaneous three times a day before meals  levothyroxine 50 MICROGram(s) Oral daily  metoprolol tartrate 25 milliGRAM(s) Oral two times a day  montelukast 10 milliGRAM(s) Oral at bedtime  pantoprazole    Tablet 40 milliGRAM(s) Oral before breakfast  sodium chloride 0.9% lock flush 3 milliLiter(s) IV Push every 8 hours  ticagrelor 90 milliGRAM(s) Oral two times a day    MEDICATIONS  (PRN):  dextrose 40% Gel 15 Gram(s) Oral once PRN Blood Glucose LESS THAN 70 milliGRAM(s)/deciliter  glucagon  Injectable 1 milliGRAM(s) IntraMuscular once PRN Glucose LESS THAN 70 milligrams/deciliter      REVIEW OF SYSTEMS:  CONSTITUTIONAL: (+) malaise.   EYES: No acute change in vision   ENT:  No tinnitus  NECK: No stiffness  RESPIRATORY: pino. No hemoptysis  CARDIOVASCULAR: No active chest pain, palpitations, syncope  GASTROINTESTINAL: No hematemesis, diarrhea, melena, or hematochezia.  GENITOURINARY: No hematuria  NEUROLOGICAL: No headaches  LYMPH Nodes: No enlarged glands  ENDOCRINE: No heat or cold intolerance	    T(C): 36.3 (11-04-18 @ 05:25), Max: 36.3 (11-03-18 @ 13:32)  HR: 67 (11-04-18 @ 05:25) (67 - 74)  BP: 104/55 (11-04-18 @ 05:25) (104/55 - 125/89)  RR: 18 (11-04-18 @ 05:25) (18 - 18)  SpO2: 100% (11-04-18 @ 05:25) (98% - 100%)    PHYSICAL EXAMINATION:   Constitutional: WD, NAD  HEENT: NC, AT  Neck:  Supple  Respiratory:  Adequate airflow b/l. Not using accessory muscles of respiration.  Cardiovascular:  S1 & S2 intact, systolic murmur, no R/G, 2+ radial pulses b/l  Gastrointestinal: Soft, NT, ND, normoactive b.s., no organomegaly/RT/rigidity  Extremities: WWP  Neurological:  Alert and awake.  No acute focal motor deficits. Crude sensation intact.     Labs and imaging reviewed    LABS:                        12.0   9.98  )-----------( 261      ( 03 Nov 2018 07:15 )             37.3     11-03    144  |  108<H>  |  34<H>  ----------------------------<  79  4.5   |  21<L>  |  1.82<H>    Ca    10.0      03 Nov 2018 07:15  Mg     2.2     11-03              CAPILLARY BLOOD GLUCOSE      POCT Blood Glucose.: 153 mg/dL (03 Nov 2018 22:03)  POCT Blood Glucose.: 124 mg/dL (03 Nov 2018 17:25)              RADIOLOGY & ADDITIONAL STUDIES:

## 2018-11-05 DIAGNOSIS — N18.3 CHRONIC KIDNEY DISEASE, STAGE 3 (MODERATE): ICD-10-CM

## 2018-11-05 DIAGNOSIS — R07.9 CHEST PAIN, UNSPECIFIED: ICD-10-CM

## 2018-11-05 LAB
BUN SERPL-MCNC: 56 MG/DL — HIGH (ref 7–23)
CALCIUM SERPL-MCNC: 9.4 MG/DL — SIGNIFICANT CHANGE UP (ref 8.4–10.5)
CHLORIDE SERPL-SCNC: 103 MMOL/L — SIGNIFICANT CHANGE UP (ref 98–107)
CO2 SERPL-SCNC: 22 MMOL/L — SIGNIFICANT CHANGE UP (ref 22–31)
CREAT SERPL-MCNC: 2.11 MG/DL — HIGH (ref 0.5–1.3)
GLUCOSE SERPL-MCNC: 154 MG/DL — HIGH (ref 70–99)
HCT VFR BLD CALC: 35.4 % — SIGNIFICANT CHANGE UP (ref 34.5–45)
HGB BLD-MCNC: 11.3 G/DL — LOW (ref 11.5–15.5)
MAGNESIUM SERPL-MCNC: 2.2 MG/DL — SIGNIFICANT CHANGE UP (ref 1.6–2.6)
MCHC RBC-ENTMCNC: 29.1 PG — SIGNIFICANT CHANGE UP (ref 27–34)
MCHC RBC-ENTMCNC: 31.9 % — LOW (ref 32–36)
MCV RBC AUTO: 91.2 FL — SIGNIFICANT CHANGE UP (ref 80–100)
NRBC # FLD: 0 — SIGNIFICANT CHANGE UP
PLATELET # BLD AUTO: 255 K/UL — SIGNIFICANT CHANGE UP (ref 150–400)
PMV BLD: 9.4 FL — SIGNIFICANT CHANGE UP (ref 7–13)
POTASSIUM SERPL-MCNC: 3.6 MMOL/L — SIGNIFICANT CHANGE UP (ref 3.5–5.3)
POTASSIUM SERPL-SCNC: 3.6 MMOL/L — SIGNIFICANT CHANGE UP (ref 3.5–5.3)
RBC # BLD: 3.88 M/UL — SIGNIFICANT CHANGE UP (ref 3.8–5.2)
RBC # FLD: 14.8 % — HIGH (ref 10.3–14.5)
SODIUM SERPL-SCNC: 142 MMOL/L — SIGNIFICANT CHANGE UP (ref 135–145)
WBC # BLD: 11.03 K/UL — HIGH (ref 3.8–10.5)
WBC # FLD AUTO: 11.03 K/UL — HIGH (ref 3.8–10.5)

## 2018-11-05 RX ORDER — INSULIN GLARGINE 100 [IU]/ML
40 INJECTION, SOLUTION SUBCUTANEOUS ONCE
Qty: 0 | Refills: 0 | Status: COMPLETED | OUTPATIENT
Start: 2018-11-05 | End: 2018-11-05

## 2018-11-05 RX ORDER — SODIUM CHLORIDE 9 MG/ML
150 INJECTION INTRAMUSCULAR; INTRAVENOUS; SUBCUTANEOUS ONCE
Qty: 0 | Refills: 0 | Status: DISCONTINUED | OUTPATIENT
Start: 2018-11-05 | End: 2018-11-05

## 2018-11-05 RX ADMIN — Medication 25 MILLIGRAM(S): at 05:30

## 2018-11-05 RX ADMIN — HEPARIN SODIUM 5000 UNIT(S): 5000 INJECTION INTRAVENOUS; SUBCUTANEOUS at 17:13

## 2018-11-05 RX ADMIN — Medication 50 MICROGRAM(S): at 05:30

## 2018-11-05 RX ADMIN — SODIUM CHLORIDE 3 MILLILITER(S): 9 INJECTION INTRAMUSCULAR; INTRAVENOUS; SUBCUTANEOUS at 21:25

## 2018-11-05 RX ADMIN — MONTELUKAST 10 MILLIGRAM(S): 4 TABLET, CHEWABLE ORAL at 21:26

## 2018-11-05 RX ADMIN — Medication 325 MILLIGRAM(S): at 17:13

## 2018-11-05 RX ADMIN — Medication 81 MILLIGRAM(S): at 13:36

## 2018-11-05 RX ADMIN — Medication 30 UNIT(S): at 17:13

## 2018-11-05 RX ADMIN — SODIUM CHLORIDE 3 MILLILITER(S): 9 INJECTION INTRAMUSCULAR; INTRAVENOUS; SUBCUTANEOUS at 05:30

## 2018-11-05 RX ADMIN — ATORVASTATIN CALCIUM 40 MILLIGRAM(S): 80 TABLET, FILM COATED ORAL at 21:26

## 2018-11-05 RX ADMIN — HEPARIN SODIUM 5000 UNIT(S): 5000 INJECTION INTRAVENOUS; SUBCUTANEOUS at 05:30

## 2018-11-05 RX ADMIN — INSULIN GLARGINE 40 UNIT(S): 100 INJECTION, SOLUTION SUBCUTANEOUS at 22:03

## 2018-11-05 RX ADMIN — TICAGRELOR 90 MILLIGRAM(S): 90 TABLET ORAL at 05:30

## 2018-11-05 RX ADMIN — PANTOPRAZOLE SODIUM 40 MILLIGRAM(S): 20 TABLET, DELAYED RELEASE ORAL at 05:30

## 2018-11-05 RX ADMIN — TICAGRELOR 90 MILLIGRAM(S): 90 TABLET ORAL at 17:13

## 2018-11-05 RX ADMIN — SODIUM CHLORIDE 3 MILLILITER(S): 9 INJECTION INTRAMUSCULAR; INTRAVENOUS; SUBCUTANEOUS at 13:02

## 2018-11-05 RX ADMIN — Medication 25 MILLIGRAM(S): at 17:13

## 2018-11-05 RX ADMIN — Medication 40 MILLIGRAM(S): at 05:30

## 2018-11-05 NOTE — PROGRESS NOTE ADULT - SUBJECTIVE AND OBJECTIVE BOX
Chief complaint  Patient is a 72y old  Female who presents with a chief complaint of pino (05 Nov 2018 06:54)   Review of systems  Patient in bed, looks comfortable, no fever, no hypoglycemia.    Labs and Fingersticks  CAPILLARY BLOOD GLUCOSE      POCT Blood Glucose.: 147 mg/dL (05 Nov 2018 08:59)  POCT Blood Glucose.: 154 mg/dL (04 Nov 2018 21:25)  POCT Blood Glucose.: 138 mg/dL (04 Nov 2018 17:07)  POCT Blood Glucose.: 191 mg/dL (04 Nov 2018 12:37)          Calcium, Total Serum: 9.4 (11-05 @ 06:50)  Calcium, Total Serum: 9.8 (11-04 @ 06:44)          11-05    142  |  103  |  56<H>  ----------------------------<  154<H>  3.6   |  22  |  2.11<H>    Ca    9.4      05 Nov 2018 06:50  Mg     2.2     11-05                          11.3   11.03 )-----------( 255      ( 05 Nov 2018 06:50 )             35.4     Medications  MEDICATIONS  (STANDING):  aspirin enteric coated 81 milliGRAM(s) Oral at bedtime  atorvastatin 40 milliGRAM(s) Oral at bedtime  dextrose 5%. 1000 milliLiter(s) (50 mL/Hr) IV Continuous <Continuous>  dextrose 50% Injectable 12.5 Gram(s) IV Push once  dextrose 50% Injectable 25 Gram(s) IV Push once  dextrose 50% Injectable 25 Gram(s) IV Push once  ferrous    sulfate 325 milliGRAM(s) Oral daily  furosemide   Injectable 40 milliGRAM(s) IV Push every 12 hours  heparin  Injectable 5000 Unit(s) SubCutaneous every 12 hours  influenza   Vaccine 0.5 milliLiter(s) IntraMuscular once  insulin glargine Injectable (LANTUS) 65 Unit(s) SubCutaneous at bedtime  insulin lispro Injectable (HumaLOG) 30 Unit(s) SubCutaneous three times a day before meals  levothyroxine 50 MICROGram(s) Oral daily  metoprolol tartrate 25 milliGRAM(s) Oral two times a day  montelukast 10 milliGRAM(s) Oral at bedtime  pantoprazole    Tablet 40 milliGRAM(s) Oral before breakfast  sodium chloride 0.9% lock flush 3 milliLiter(s) IV Push every 8 hours  ticagrelor 90 milliGRAM(s) Oral two times a day      Physical Exam  General: Patient comfortable in bed  Vital Signs Last 12 Hrs  T(F): 97.4 (11-05-18 @ 05:28), Max: 97.4 (11-05-18 @ 05:28)  HR: 74 (11-05-18 @ 05:28) (74 - 74)  BP: 113/70 (11-05-18 @ 05:28) (113/70 - 113/70)  BP(mean): --  RR: 18 (11-05-18 @ 05:28) (18 - 18)  SpO2: 100% (11-05-18 @ 05:28) (100% - 100%)  Neck: No palpable thyroid nodules.  CVS: S1S2, No murmurs  Respiratory: No wheezing, no crepitations  GI: Abdomen soft, bowel sounds positive  Musculoskeletal:  edema lower extremities.   Skin: No skin rashes, no ecchymosis    Diagnostics

## 2018-11-05 NOTE — PROGRESS NOTE ADULT - SUBJECTIVE AND OBJECTIVE BOX
Patient seen and examined at bedside  No acute events noted overnight  Case discussed with medical team    HPI:  71 y/o F with a PMHx of CAD S/P CABG and stent, ischemic cardiomyopathy with moderate LV dysfunction (EF 37%), HTN, HLD, DM, hypothyroidism and GERD presents for cardiac catheretization. Pt states that she has been experiencing intermittent chest heaviness and was sent for a stress test by Dr. Cotto. Pt reports having an abnormal stress test but results have not been provided. Pt denies N/V/D, fevers, chills, cough, palpitations, syncope, dyspnea on exertion, orthopnea, nocturnal paroxysmal dyspnea, edema, cyanosis, heart murmurs, varicosities, phlebitis, claudication. (02 Nov 2018 12:05)      PAST MEDICAL & SURGICAL HISTORY:  GERD (gastroesophageal reflux disease)  CAD (coronary artery disease): s/p CABG  Hypothyroidism  Hyperlipidemia  Diabetes mellitus, type 2  S/P CABG (coronary artery bypass graft)      No Known Allergies       MEDICATIONS  (STANDING):  aspirin enteric coated 81 milliGRAM(s) Oral at bedtime  atorvastatin 40 milliGRAM(s) Oral at bedtime  dextrose 5%. 1000 milliLiter(s) (50 mL/Hr) IV Continuous <Continuous>  dextrose 50% Injectable 12.5 Gram(s) IV Push once  dextrose 50% Injectable 25 Gram(s) IV Push once  dextrose 50% Injectable 25 Gram(s) IV Push once  ferrous    sulfate 325 milliGRAM(s) Oral daily  furosemide   Injectable 40 milliGRAM(s) IV Push every 12 hours  heparin  Injectable 5000 Unit(s) SubCutaneous every 12 hours  influenza   Vaccine 0.5 milliLiter(s) IntraMuscular once  insulin glargine Injectable (LANTUS) 65 Unit(s) SubCutaneous at bedtime  insulin lispro Injectable (HumaLOG) 30 Unit(s) SubCutaneous three times a day before meals  levothyroxine 50 MICROGram(s) Oral daily  metoprolol tartrate 25 milliGRAM(s) Oral two times a day  montelukast 10 milliGRAM(s) Oral at bedtime  pantoprazole    Tablet 40 milliGRAM(s) Oral before breakfast  sodium chloride 0.9% lock flush 3 milliLiter(s) IV Push every 8 hours  ticagrelor 90 milliGRAM(s) Oral two times a day    MEDICATIONS  (PRN):  dextrose 40% Gel 15 Gram(s) Oral once PRN Blood Glucose LESS THAN 70 milliGRAM(s)/deciliter  glucagon  Injectable 1 milliGRAM(s) IntraMuscular once PRN Glucose LESS THAN 70 milligrams/deciliter      REVIEW OF SYSTEMS:  CONSTITUTIONAL: (+) malaise.   EYES: No acute change in vision   ENT:  No tinnitus  NECK: No stiffness  RESPIRATORY: No hemoptysis  CARDIOVASCULAR: No chest pain, palpitations, syncope  GASTROINTESTINAL: No hematemesis, diarrhea, melena, or hematochezia.  GENITOURINARY: No hematuria  NEUROLOGICAL: No headaches  LYMPH Nodes: No enlarged glands  ENDOCRINE: No heat or cold intolerance	    T(C): 36.3 (11-05-18 @ 05:28), Max: 36.5 (11-04-18 @ 21:08)  HR: 74 (11-05-18 @ 05:28) (66 - 82)  BP: 113/70 (11-05-18 @ 05:28) (106/55 - 129/62)  RR: 18 (11-05-18 @ 05:28) (18 - 18)  SpO2: 100% (11-05-18 @ 05:28) (98% - 100%)    PHYSICAL EXAMINATION:   Constitutional: WD, NAD  HEENT: NC, AT  Neck:  Supple  Respiratory:  Adequate airflow b/l. Not using accessory muscles of respiration.  Cardiovascular:  S1 & S2 intact, no R/G, 2+ radial pulses b/l  Gastrointestinal: Soft, NT, ND, normoactive b.s., no organomegaly/RT/rigidity  Extremities: WWP  Neurological:  Alert and awake.  No acute focal motor deficits. Crude sensation intact.     Labs and imaging reviewed    LABS:                        11.8   9.21  )-----------( 245      ( 04 Nov 2018 06:44 )             35.9     11-04    141  |  101  |  46<H>  ----------------------------<  76  3.5   |  22  |  1.93<H>    Ca    9.8      04 Nov 2018 06:44  Mg     2.2     11-03              CAPILLARY BLOOD GLUCOSE      POCT Blood Glucose.: 154 mg/dL (04 Nov 2018 21:25)  POCT Blood Glucose.: 138 mg/dL (04 Nov 2018 17:07)  POCT Blood Glucose.: 191 mg/dL (04 Nov 2018 12:37)  POCT Blood Glucose.: 75 mg/dL (04 Nov 2018 08:30)              RADIOLOGY & ADDITIONAL STUDIES:

## 2018-11-05 NOTE — PROGRESS NOTE ADULT - ASSESSMENT
Assessment  DMT2: 72y Female with DM T2 with hyperglycemia on basal bolus insulin, blood sugars improving, no hypoglycemic episode, compliant with low carb diet, NPO today for procedure.  CAD: On medications, stable, monitored.  Hypothyroidism:  On synthroid 50 mcg po daily, asymptomatic.  HTN: Controlled, On med.

## 2018-11-05 NOTE — PROGRESS NOTE ADULT - PROBLEM SELECTOR PLAN 1
Will continue current insulin regimen for now. Will continue monitoring FS, log, will Follow up.  Will restart bolus insulin once starts eating full meals, FU  Patient counseled for compliance with consistent low carb diet.

## 2018-11-05 NOTE — PROGRESS NOTE ADULT - ASSESSMENT
71 y/o F with a PMHx of CAD S/P CABG and stent, ischemic cardiomyopathy with moderate LV dysfunction (EF 37%), HTN, HLD, DM, hypothyroidism and GERD presents for cardiac catheretization with c/o chest heaviness and pino

## 2018-11-05 NOTE — PHYSICAL THERAPY INITIAL EVALUATION ADULT - PERTINENT HX OF CURRENT PROBLEM, REHAB EVAL
71 y/o F with a PMHx of CAD S/P CABG and stent, ischemic cardiomyopathy with moderate LV dysfunction (EF 37%), HTN, HLD, DM, hypothyroidism and GERD presents for cardiac catheretization. Pt states that she has been experiencing intermittent chest heaviness and was sent for a stress test by Dr. Cotto.

## 2018-11-05 NOTE — PROGRESS NOTE ADULT - PROBLEM SELECTOR PLAN 1
baseline ~ 1.5-1.8  slightly uptrending. currently on lasix 40mg iv bid. takes lasix 40mg po bid at home. seems euvolemic on exam, however pt is c/o SOB and has poor EF.   Plan for cardiac cath today. is give lasix this AM  monitor bmp closely after cath  avoid nephrotoxic agents baseline ~ 1.5-1.8  slightly uptrending. currently on lasix 40mg iv bid. takes lasix 40mg po bid at home. seems euvolemic on exam, however pt is c/o SOB and has poor EF.   Plan for cardiac cath today. is give lasix this AM  check urine cr, urea, UA  monitor bmp closely after cath  avoid nephrotoxic agents baseline ~ 1.5-1.8  slightly uptrending. currently on lasix 40mg iv bid. takes lasix 40mg po bid at home. seems euvolemic on exam, however pt is c/o SOB and has poor EF.   Planned for cardiac cath canceled today   lasix planned to be on hold  check urine cr, urea, UA  monitor bmp    avoid nephrotoxic agents

## 2018-11-05 NOTE — PHYSICAL THERAPY INITIAL EVALUATION ADULT - DISCHARGE DISPOSITION, PT EVAL
Home w/ Home PT to address current functional limitations to optimize safety within home environment with the use of rolling walker

## 2018-11-05 NOTE — PROGRESS NOTE ADULT - SUBJECTIVE AND OBJECTIVE BOX
[facility]   MD RAHEEL SHAH MD ANGELA WONG, PA    TEL:  OFFICE: [phone]  DR SANTOYO CELL: [phone]  DR. NGUYEN CELL: [phone]  UMM KENDALL CELL: [phone]        Patient is a 72y old  Female who presents with a chief complaint of pino (05 Nov 2018 06:54)      Patient seen and examined at bedside. + chest pain/sob    VITALS:  T(F): 97.4 (11-05-18 @ 12:37), Max: 97.7 (11-04-18 @ 21:08)  HR: 73 (11-05-18 @ 12:37)  BP: 117/66 (11-05-18 @ 12:37)  RR: 17 (11-05-18 @ 12:37)  SpO2: 100% (11-05-18 @ 12:37)  Wt(kg): --    11-04 @ 07:01  -  11-05 @ 07:00  --------------------------------------------------------  IN: 320 mL / OUT: 440 mL / NET: -120 mL          PHYSICAL EXAM:  Constitutional: NAD  Neck: No JVD  Respiratory: CTAB, no wheezes, rales or rhonchi  Cardiovascular: S1, S2, RRR  Gastrointestinal: BS+, soft, NT/ND  Extremities: No peripheral edema    Hospital Medications:   MEDICATIONS  (STANDING):  aspirin enteric coated 81 milliGRAM(s) Oral at bedtime  atorvastatin 40 milliGRAM(s) Oral at bedtime  dextrose 5%. 1000 milliLiter(s) (50 mL/Hr) IV Continuous <Continuous>  dextrose 50% Injectable 12.5 Gram(s) IV Push once  dextrose 50% Injectable 25 Gram(s) IV Push once  dextrose 50% Injectable 25 Gram(s) IV Push once  ferrous    sulfate 325 milliGRAM(s) Oral daily  furosemide   Injectable 40 milliGRAM(s) IV Push every 12 hours  heparin  Injectable 5000 Unit(s) SubCutaneous every 12 hours  influenza   Vaccine 0.5 milliLiter(s) IntraMuscular once  insulin glargine Injectable (LANTUS) 65 Unit(s) SubCutaneous at bedtime  insulin lispro Injectable (HumaLOG) 30 Unit(s) SubCutaneous three times a day before meals  levothyroxine 50 MICROGram(s) Oral daily  metoprolol tartrate 25 milliGRAM(s) Oral two times a day  montelukast 10 milliGRAM(s) Oral at bedtime  pantoprazole    Tablet 40 milliGRAM(s) Oral before breakfast  sodium chloride 0.9% lock flush 3 milliLiter(s) IV Push every 8 hours  ticagrelor 90 milliGRAM(s) Oral two times a day      LABS:  11-05    142  |  103  |  56<H>  ----------------------------<  154<H>  3.6   |  22  |  2.11<H>    Ca    9.4      05 Nov 2018 06:50  Mg     2.2     11-05      Creatinine Trend: 2.11 <--, 1.93 <--, 1.82 <--, 1.50 <--                                11.3   11.03 )-----------( 255      ( 05 Nov 2018 06:50 )             35.4     Urine Studies:      Iron 40, TIBC 377, %sat --      [01-20-18 @ 06:06]  Ferritin 38.35      [01-20-18 @ 06:06]  PTH 43.55 (Ca --)      [09-10-18 @ 07:15]   --  PTH 36.60 (Ca --)      [09-08-18 @ 03:15]   --  Vitamin D (25OH) 15.8      [09-08-18 @ 03:15]  HbA1c 7.8      [11-02-18 @ 12:00]  TSH 2.03      [09-25-18 @ 04:20]  Lipid: chol 132, , HDL 31, LDL 78      [09-25-18 @ 04:20]        RADIOLOGY & ADDITIONAL STUDIES:

## 2018-11-05 NOTE — PROGRESS NOTE ADULT - ASSESSMENT
73 y/o F with a PMHx of CAD S/P CABG and stent, ischemic cardiomyopathy with moderate LV dysfunction (EF 37%), HTN, HLD, DM, hypothyroidism and GERD presents for cardiac catheretization with c/o chest heaviness and pino    -> History of CAD and Chest Pain:       - Plan for PCI today +/- stent, npo after midnight       - C/w cardiac meds      - Tele. PT.       - Cardiology management appreciated   -> CHFrEF:       - C/w cardiac meds   -> DM II:       - C/w ihss. Monitor acks. DM education  -> Need for prophylactic measure: dvt/gi ppx

## 2018-11-06 LAB
BASOPHILS # BLD AUTO: 0.05 K/UL — SIGNIFICANT CHANGE UP (ref 0–0.2)
BASOPHILS NFR BLD AUTO: 0.5 % — SIGNIFICANT CHANGE UP (ref 0–2)
BUN SERPL-MCNC: 53 MG/DL — HIGH (ref 7–23)
CALCIUM SERPL-MCNC: 9.4 MG/DL — SIGNIFICANT CHANGE UP (ref 8.4–10.5)
CHLORIDE SERPL-SCNC: 102 MMOL/L — SIGNIFICANT CHANGE UP (ref 98–107)
CO2 SERPL-SCNC: 23 MMOL/L — SIGNIFICANT CHANGE UP (ref 22–31)
CREAT SERPL-MCNC: 2.05 MG/DL — HIGH (ref 0.5–1.3)
EOSINOPHIL # BLD AUTO: 0.3 K/UL — SIGNIFICANT CHANGE UP (ref 0–0.5)
EOSINOPHIL NFR BLD AUTO: 2.8 % — SIGNIFICANT CHANGE UP (ref 0–6)
GLUCOSE SERPL-MCNC: 54 MG/DL — LOW (ref 70–99)
HCT VFR BLD CALC: 35.1 % — SIGNIFICANT CHANGE UP (ref 34.5–45)
HGB BLD-MCNC: 11.4 G/DL — LOW (ref 11.5–15.5)
IMM GRANULOCYTES # BLD AUTO: 0.07 # — SIGNIFICANT CHANGE UP
IMM GRANULOCYTES NFR BLD AUTO: 0.6 % — SIGNIFICANT CHANGE UP (ref 0–1.5)
LYMPHOCYTES # BLD AUTO: 3.47 K/UL — HIGH (ref 1–3.3)
LYMPHOCYTES # BLD AUTO: 32 % — SIGNIFICANT CHANGE UP (ref 13–44)
MAGNESIUM SERPL-MCNC: 2.3 MG/DL — SIGNIFICANT CHANGE UP (ref 1.6–2.6)
MCHC RBC-ENTMCNC: 29.4 PG — SIGNIFICANT CHANGE UP (ref 27–34)
MCHC RBC-ENTMCNC: 32.5 % — SIGNIFICANT CHANGE UP (ref 32–36)
MCV RBC AUTO: 90.5 FL — SIGNIFICANT CHANGE UP (ref 80–100)
MONOCYTES # BLD AUTO: 1.17 K/UL — HIGH (ref 0–0.9)
MONOCYTES NFR BLD AUTO: 10.8 % — SIGNIFICANT CHANGE UP (ref 2–14)
NEUTROPHILS # BLD AUTO: 5.79 K/UL — SIGNIFICANT CHANGE UP (ref 1.8–7.4)
NEUTROPHILS NFR BLD AUTO: 53.3 % — SIGNIFICANT CHANGE UP (ref 43–77)
NRBC # FLD: 0 — SIGNIFICANT CHANGE UP
PLATELET # BLD AUTO: 277 K/UL — SIGNIFICANT CHANGE UP (ref 150–400)
PMV BLD: 8.9 FL — SIGNIFICANT CHANGE UP (ref 7–13)
POTASSIUM SERPL-MCNC: 3.3 MMOL/L — LOW (ref 3.5–5.3)
POTASSIUM SERPL-SCNC: 3.3 MMOL/L — LOW (ref 3.5–5.3)
RBC # BLD: 3.88 M/UL — SIGNIFICANT CHANGE UP (ref 3.8–5.2)
RBC # FLD: 14.8 % — HIGH (ref 10.3–14.5)
SODIUM SERPL-SCNC: 140 MMOL/L — SIGNIFICANT CHANGE UP (ref 135–145)
WBC # BLD: 10.85 K/UL — HIGH (ref 3.8–10.5)
WBC # FLD AUTO: 10.85 K/UL — HIGH (ref 3.8–10.5)

## 2018-11-06 RX ORDER — POTASSIUM CHLORIDE 20 MEQ
40 PACKET (EA) ORAL EVERY 4 HOURS
Qty: 0 | Refills: 0 | Status: COMPLETED | OUTPATIENT
Start: 2018-11-06 | End: 2018-11-06

## 2018-11-06 RX ORDER — SODIUM CHLORIDE 9 MG/ML
1000 INJECTION INTRAMUSCULAR; INTRAVENOUS; SUBCUTANEOUS
Qty: 0 | Refills: 0 | Status: DISCONTINUED | OUTPATIENT
Start: 2018-11-06 | End: 2018-11-06

## 2018-11-06 RX ADMIN — HEPARIN SODIUM 5000 UNIT(S): 5000 INJECTION INTRAVENOUS; SUBCUTANEOUS at 05:43

## 2018-11-06 RX ADMIN — TICAGRELOR 90 MILLIGRAM(S): 90 TABLET ORAL at 17:38

## 2018-11-06 RX ADMIN — SODIUM CHLORIDE 75 MILLILITER(S): 9 INJECTION INTRAMUSCULAR; INTRAVENOUS; SUBCUTANEOUS at 13:23

## 2018-11-06 RX ADMIN — Medication 30 UNIT(S): at 13:06

## 2018-11-06 RX ADMIN — Medication 25 MILLIGRAM(S): at 17:38

## 2018-11-06 RX ADMIN — Medication 325 MILLIGRAM(S): at 17:38

## 2018-11-06 RX ADMIN — Medication 40 MILLIEQUIVALENT(S): at 13:07

## 2018-11-06 RX ADMIN — MONTELUKAST 10 MILLIGRAM(S): 4 TABLET, CHEWABLE ORAL at 21:31

## 2018-11-06 RX ADMIN — Medication 30 MILLILITER(S): at 11:50

## 2018-11-06 RX ADMIN — Medication 40 MILLIEQUIVALENT(S): at 09:20

## 2018-11-06 RX ADMIN — PANTOPRAZOLE SODIUM 40 MILLIGRAM(S): 20 TABLET, DELAYED RELEASE ORAL at 05:43

## 2018-11-06 RX ADMIN — ATORVASTATIN CALCIUM 40 MILLIGRAM(S): 80 TABLET, FILM COATED ORAL at 21:31

## 2018-11-06 RX ADMIN — SODIUM CHLORIDE 3 MILLILITER(S): 9 INJECTION INTRAMUSCULAR; INTRAVENOUS; SUBCUTANEOUS at 13:06

## 2018-11-06 RX ADMIN — Medication 81 MILLIGRAM(S): at 21:31

## 2018-11-06 RX ADMIN — HEPARIN SODIUM 5000 UNIT(S): 5000 INJECTION INTRAVENOUS; SUBCUTANEOUS at 17:38

## 2018-11-06 RX ADMIN — Medication 50 MICROGRAM(S): at 05:43

## 2018-11-06 RX ADMIN — Medication 30 UNIT(S): at 09:20

## 2018-11-06 RX ADMIN — Medication 25 MILLIGRAM(S): at 05:43

## 2018-11-06 RX ADMIN — TICAGRELOR 90 MILLIGRAM(S): 90 TABLET ORAL at 05:43

## 2018-11-06 RX ADMIN — Medication 30 UNIT(S): at 17:55

## 2018-11-06 RX ADMIN — INSULIN GLARGINE 65 UNIT(S): 100 INJECTION, SOLUTION SUBCUTANEOUS at 21:31

## 2018-11-06 RX ADMIN — SODIUM CHLORIDE 3 MILLILITER(S): 9 INJECTION INTRAMUSCULAR; INTRAVENOUS; SUBCUTANEOUS at 21:27

## 2018-11-06 RX ADMIN — SODIUM CHLORIDE 3 MILLILITER(S): 9 INJECTION INTRAMUSCULAR; INTRAVENOUS; SUBCUTANEOUS at 05:43

## 2018-11-06 NOTE — PROGRESS NOTE ADULT - SUBJECTIVE AND OBJECTIVE BOX
Patient seen and examined at bedside  cath on hold 2/2 duncan   Case discussed with medical team    HPI:  71 y/o F with a PMHx of CAD S/P CABG and stent, ischemic cardiomyopathy with moderate LV dysfunction (EF 37%), HTN, HLD, DM, hypothyroidism and GERD presents for cardiac catheretization. Pt states that she has been experiencing intermittent chest heaviness and was sent for a stress test by Dr. Cotto. Pt reports having an abnormal stress test but results have not been provided. Pt denies N/V/D, fevers, chills, cough, palpitations, syncope, dyspnea on exertion, orthopnea, nocturnal paroxysmal dyspnea, edema, cyanosis, heart murmurs, varicosities, phlebitis, claudication. (02 Nov 2018 12:05)      PAST MEDICAL & SURGICAL HISTORY:  GERD (gastroesophageal reflux disease)  CAD (coronary artery disease): s/p CABG  Hypothyroidism  Hyperlipidemia  Diabetes mellitus, type 2  S/P CABG (coronary artery bypass graft)      No Known Allergies       MEDICATIONS  (STANDING):  aspirin enteric coated 81 milliGRAM(s) Oral at bedtime  atorvastatin 40 milliGRAM(s) Oral at bedtime  dextrose 5%. 1000 milliLiter(s) (50 mL/Hr) IV Continuous <Continuous>  dextrose 50% Injectable 12.5 Gram(s) IV Push once  dextrose 50% Injectable 25 Gram(s) IV Push once  dextrose 50% Injectable 25 Gram(s) IV Push once  ferrous    sulfate 325 milliGRAM(s) Oral daily  heparin  Injectable 5000 Unit(s) SubCutaneous every 12 hours  influenza   Vaccine 0.5 milliLiter(s) IntraMuscular once  insulin glargine Injectable (LANTUS) 65 Unit(s) SubCutaneous at bedtime  insulin lispro Injectable (HumaLOG) 30 Unit(s) SubCutaneous three times a day before meals  levothyroxine 50 MICROGram(s) Oral daily  metoprolol tartrate 25 milliGRAM(s) Oral two times a day  montelukast 10 milliGRAM(s) Oral at bedtime  pantoprazole    Tablet 40 milliGRAM(s) Oral before breakfast  sodium chloride 0.9% lock flush 3 milliLiter(s) IV Push every 8 hours  ticagrelor 90 milliGRAM(s) Oral two times a day    MEDICATIONS  (PRN):  dextrose 40% Gel 15 Gram(s) Oral once PRN Blood Glucose LESS THAN 70 milliGRAM(s)/deciliter  glucagon  Injectable 1 milliGRAM(s) IntraMuscular once PRN Glucose LESS THAN 70 milligrams/deciliter      REVIEW OF SYSTEMS:  CONSTITUTIONAL: (+) malaise.   EYES: No acute change in vision   ENT:  No tinnitus  NECK: No stiffness  RESPIRATORY: pino. No hemoptysis  CARDIOVASCULAR: no active chest pain. no  palpitations, syncope  GASTROINTESTINAL: No hematemesis, diarrhea, melena, or hematochezia.  GENITOURINARY: No hematuria  NEUROLOGICAL: No headaches  LYMPH Nodes: No enlarged glands  ENDOCRINE: No heat or cold intolerance	    T(C): 36.3 (11-06-18 @ 05:42), Max: 36.3 (11-05-18 @ 12:37)  HR: 65 (11-06-18 @ 05:42) (65 - 73)  BP: 113/64 (11-06-18 @ 05:42) (113/64 - 120/85)  RR: 17 (11-06-18 @ 05:42) (17 - 18)  SpO2: 100% (11-06-18 @ 05:42) (100% - 100%)    PHYSICAL EXAMINATION:   Constitutional: WD, NAD  HEENT: NC, AT  Neck:  Supple  Respiratory:  Adequate airflow b/l. Not using accessory muscles of respiration.  Cardiovascular:  S1 & S2 intact, systolic murmur, no R/G, 2+ radial pulses b/l  Gastrointestinal: Soft, NT, ND, normoactive b.s., no organomegaly/RT/rigidity  Extremities: WWP  Neurological:  Alert and awake.  No acute focal motor deficits. Crude sensation intact.     Labs and imaging reviewed    LABS:                        11.4   10.85 )-----------( 277      ( 06 Nov 2018 03:30 )             35.1     11-06    140  |  102  |  53<H>  ----------------------------<  54<L>  3.3<L>   |  23  |  2.05<H>    Ca    9.4      06 Nov 2018 03:30  Mg     2.3     11-06              CAPILLARY BLOOD GLUCOSE      POCT Blood Glucose.: 71 mg/dL (05 Nov 2018 21:11)  POCT Blood Glucose.: 204 mg/dL (05 Nov 2018 17:03)  POCT Blood Glucose.: 90 mg/dL (05 Nov 2018 12:32)  POCT Blood Glucose.: 147 mg/dL (05 Nov 2018 08:59)              RADIOLOGY & ADDITIONAL STUDIES:

## 2018-11-06 NOTE — PROGRESS NOTE ADULT - SUBJECTIVE AND OBJECTIVE BOX
Chief complaint  Patient is a 72y old  Female who presents with a chief complaint of pino and chest pain (06 Nov 2018 07:42)   Review of systems  Patient in bed, looks comfortable, no fever, no hypoglycemia.    Labs and Fingersticks  CAPILLARY BLOOD GLUCOSE      POCT Blood Glucose.: 168 mg/dL (06 Nov 2018 12:28)  POCT Blood Glucose.: 136 mg/dL (06 Nov 2018 08:56)  POCT Blood Glucose.: 71 mg/dL (05 Nov 2018 21:11)  POCT Blood Glucose.: 204 mg/dL (05 Nov 2018 17:03)    Calcium, Total Serum: 9.4 (11-06 @ 03:30)  Calcium, Total Serum: 9.4 (11-05 @ 06:50)          11-06    140  |  102  |  53<H>  ----------------------------<  54<L>  3.3<L>   |  23  |  2.05<H>    Ca    9.4      06 Nov 2018 03:30  Mg     2.3     11-06                          11.4   10.85 )-----------( 277      ( 06 Nov 2018 03:30 )             35.1     Medications  MEDICATIONS  (STANDING):  aspirin enteric coated 81 milliGRAM(s) Oral at bedtime  atorvastatin 40 milliGRAM(s) Oral at bedtime  dextrose 5%. 1000 milliLiter(s) (50 mL/Hr) IV Continuous <Continuous>  dextrose 50% Injectable 12.5 Gram(s) IV Push once  dextrose 50% Injectable 25 Gram(s) IV Push once  dextrose 50% Injectable 25 Gram(s) IV Push once  ferrous    sulfate 325 milliGRAM(s) Oral daily  heparin  Injectable 5000 Unit(s) SubCutaneous every 12 hours  influenza   Vaccine 0.5 milliLiter(s) IntraMuscular once  insulin glargine Injectable (LANTUS) 65 Unit(s) SubCutaneous at bedtime  insulin lispro Injectable (HumaLOG) 30 Unit(s) SubCutaneous three times a day before meals  levothyroxine 50 MICROGram(s) Oral daily  metoprolol tartrate 25 milliGRAM(s) Oral two times a day  montelukast 10 milliGRAM(s) Oral at bedtime  pantoprazole    Tablet 40 milliGRAM(s) Oral before breakfast  sodium chloride 0.9% lock flush 3 milliLiter(s) IV Push every 8 hours  ticagrelor 90 milliGRAM(s) Oral two times a day      Physical Exam  General: Patient comfortable in bed  Vital Signs Last 12 Hrs  T(F): 98 (11-06-18 @ 14:34), Max: 98 (11-06-18 @ 14:34)  HR: 73 (11-06-18 @ 14:34) (65 - 73)  BP: 107/7 (11-06-18 @ 14:34) (107/7 - 113/64)  BP(mean): --  RR: 16 (11-06-18 @ 14:34) (16 - 17)  SpO2: 100% (11-06-18 @ 14:34) (100% - 100%)  Neck: No palpable thyroid nodules.  CVS: S1S2, No murmurs  Respiratory: No wheezing, no crepitations  GI: Abdomen soft, bowel sounds positive  Musculoskeletal:  edema lower extremities.   Skin: No skin rashes, no ecchymosis    Diagnostics

## 2018-11-06 NOTE — PROGRESS NOTE ADULT - SUBJECTIVE AND OBJECTIVE BOX
[facility] [place] [facility]   MD RAHEEL SHAH MD RUORU WONG, PA    TEL:  OFFICE: [phone]  DR SANTOYO CELL: [phone]  JUSTEN KENDALL CELL: [phone]  DR. NGUYEN CELL: [phone]    RENAL FOLLOW UP NOTE  --------------------------------------------------------------------------------  HPI:      Pt seen and examined at bedside.   Keira SOB, chest pain     PAST HISTORY  --------------------------------------------------------------------------------  No significant changes to PMH, PSH, FHx, SHx, unless otherwise noted    ALLERGIES & MEDICATIONS  --------------------------------------------------------------------------------  Allergies    No Known Allergies    Intolerances      Standing Inpatient Medications  aspirin enteric coated 81 milliGRAM(s) Oral at bedtime  atorvastatin 40 milliGRAM(s) Oral at bedtime  dextrose 5%. 1000 milliLiter(s) IV Continuous <Continuous>  dextrose 50% Injectable 12.5 Gram(s) IV Push once  dextrose 50% Injectable 25 Gram(s) IV Push once  dextrose 50% Injectable 25 Gram(s) IV Push once  ferrous    sulfate 325 milliGRAM(s) Oral daily  heparin  Injectable 5000 Unit(s) SubCutaneous every 12 hours  influenza   Vaccine 0.5 milliLiter(s) IntraMuscular once  insulin glargine Injectable (LANTUS) 65 Unit(s) SubCutaneous at bedtime  insulin lispro Injectable (HumaLOG) 30 Unit(s) SubCutaneous three times a day before meals  levothyroxine 50 MICROGram(s) Oral daily  metoprolol tartrate 25 milliGRAM(s) Oral two times a day  montelukast 10 milliGRAM(s) Oral at bedtime  pantoprazole    Tablet 40 milliGRAM(s) Oral before breakfast  sodium chloride 0.9% lock flush 3 milliLiter(s) IV Push every 8 hours  ticagrelor 90 milliGRAM(s) Oral two times a day    PRN Inpatient Medications  aluminum hydroxide/magnesium hydroxide/simethicone Suspension 30 milliLiter(s) Oral every 4 hours PRN  dextrose 40% Gel 15 Gram(s) Oral once PRN  glucagon  Injectable 1 milliGRAM(s) IntraMuscular once PRN      REVIEW OF SYSTEMS  --------------------------------------------------------------------------------  General: no fever  CVS: no chest pain  RESP: no sob, no cough  ABD: no abdominal pain      VITALS/PHYSICAL EXAM  --------------------------------------------------------------------------------  T(C): 36.7 (11-06-18 @ 14:34), Max: 36.7 (11-06-18 @ 14:34)  HR: 73 (11-06-18 @ 14:34) (65 - 73)  BP: 107/7 (11-06-18 @ 14:34) (107/7 - 120/85)  RR: 16 (11-06-18 @ 14:34) (16 - 18)  SpO2: 100% (11-06-18 @ 14:34) (100% - 100%)  Wt(kg): --        11-05-18 @ 07:01  -  11-06-18 @ 07:00  --------------------------------------------------------  IN: 360 mL / OUT: 550 mL / NET: -190 mL    11-06-18 @ 07:01  -  11-06-18 @ 15:59  --------------------------------------------------------  IN: 0 mL / OUT: 600 mL / NET: -600 mL      Physical Exam:  	Gen: NAD  	HEENT: MMMARY  	Pulm: CTA B/L  	CV: S1S2  	Abd: Soft, +BS  	Ext: No LE edema B/L                      Neuro: Awake   	Skin: Warm and Dry   	    LABS/STUDIES  --------------------------------------------------------------------------------              11.4   10.85 >-----------<  277      [11-06-18 @ 03:30]              35.1     140  |  102  |  53  ----------------------------<  54      [11-06-18 @ 03:30]  3.3   |  23  |  2.05        Ca     9.4     [11-06-18 @ 03:30]      Mg     2.3     [11-06-18 @ 03:30]            Creatinine Trend:  SCr 2.05 [11-06 @ 03:30]  SCr 2.11 [11-05 @ 06:50]  SCr 1.93 [11-04 @ 06:44]  SCr 1.82 [11-03 @ 07:15]  SCr 1.50 [11-02 @ 12:00]        Iron 40, TIBC 377, %sat --      [01-20-18 @ 06:06]  Ferritin 38.35      [01-20-18 @ 06:06]  PTH 43.55 (Ca --)      [09-10-18 @ 07:15]   --  PTH 36.60 (Ca --)      [09-08-18 @ 03:15]   --  Vitamin D (25OH) 15.8      [09-08-18 @ 03:15]  HbA1c 7.8      [11-02-18 @ 12:00]  TSH 2.03      [09-25-18 @ 04:20]  Lipid: chol 132, , HDL 31, LDL 78      [09-25-18 @ 04:20]

## 2018-11-06 NOTE — CHART NOTE - NSCHARTNOTEFT_GEN_A_CORE
case discussed with medical team, d/c ivf, repeat bmp in AM, if renal function improves then will plan for pci

## 2018-11-06 NOTE — PROGRESS NOTE ADULT - ASSESSMENT
71 y/o F with a PMHx of CAD S/P CABG and stent, ischemic cardiomyopathy with moderate LV dysfunction (EF 37%), HTN, HLD, DM, hypothyroidism and GERD presents for cardiac catheretization with c/o chest heaviness and pino    -> MERVIN on CKD 3:      - F/u AM labs. Holding lasix.  Avoiding nephrotoxic rx  -> History of CAD and Chest Pain:       - Plan for PCI when MERVIN improves       - C/w cardiac meds      - Tele. PT.       - Cardiology management appreciated   -> CHFrEF:       - Improved. Euvolemic. C/w cardiac meds   -> DM II:       - With longterm complications of hyperglycemia. C/w ihss. Monitor acks. DM education  -> Need for prophylactic measure: dvt/gi ppx

## 2018-11-06 NOTE — PROGRESS NOTE ADULT - PROBLEM SELECTOR PLAN 1
baseline ~ 1.5-1.8  PT with mild improvement in renal function today  Recommend to continue to hold lasix  Pt planned for cath tomorrow ,   Cleared from renal stand point if renal function remains stable tomorrow   PT at 26% risk of developing NGHIA, and 1.09% risk of needing Dialysis   Monitor bmp    Avoid nephrotoxic agents

## 2018-11-07 LAB
BUN SERPL-MCNC: 39 MG/DL — HIGH (ref 7–23)
CALCIUM SERPL-MCNC: 9 MG/DL — SIGNIFICANT CHANGE UP (ref 8.4–10.5)
CHLORIDE SERPL-SCNC: 109 MMOL/L — HIGH (ref 98–107)
CO2 SERPL-SCNC: 21 MMOL/L — LOW (ref 22–31)
CREAT SERPL-MCNC: 1.76 MG/DL — HIGH (ref 0.5–1.3)
GLUCOSE SERPL-MCNC: 53 MG/DL — LOW (ref 70–99)
HCT VFR BLD CALC: 32.7 % — LOW (ref 34.5–45)
HGB BLD-MCNC: 10.5 G/DL — LOW (ref 11.5–15.5)
MCHC RBC-ENTMCNC: 29.2 PG — SIGNIFICANT CHANGE UP (ref 27–34)
MCHC RBC-ENTMCNC: 32.1 % — SIGNIFICANT CHANGE UP (ref 32–36)
MCV RBC AUTO: 90.8 FL — SIGNIFICANT CHANGE UP (ref 80–100)
NRBC # FLD: 0 — SIGNIFICANT CHANGE UP
PLATELET # BLD AUTO: 243 K/UL — SIGNIFICANT CHANGE UP (ref 150–400)
PMV BLD: 9.2 FL — SIGNIFICANT CHANGE UP (ref 7–13)
POTASSIUM SERPL-MCNC: 4.5 MMOL/L — SIGNIFICANT CHANGE UP (ref 3.5–5.3)
POTASSIUM SERPL-SCNC: 4.5 MMOL/L — SIGNIFICANT CHANGE UP (ref 3.5–5.3)
RBC # BLD: 3.6 M/UL — LOW (ref 3.8–5.2)
RBC # FLD: 14.8 % — HIGH (ref 10.3–14.5)
SODIUM SERPL-SCNC: 143 MMOL/L — SIGNIFICANT CHANGE UP (ref 135–145)
WBC # BLD: 7.78 K/UL — SIGNIFICANT CHANGE UP (ref 3.8–10.5)
WBC # FLD AUTO: 7.78 K/UL — SIGNIFICANT CHANGE UP (ref 3.8–10.5)

## 2018-11-07 PROCEDURE — 93010 ELECTROCARDIOGRAM REPORT: CPT

## 2018-11-07 PROCEDURE — 76937 US GUIDE VASCULAR ACCESS: CPT | Mod: 26

## 2018-11-07 PROCEDURE — 92928 PRQ TCAT PLMT NTRAC ST 1 LES: CPT | Mod: RI

## 2018-11-07 RX ORDER — ACETAMINOPHEN 500 MG
650 TABLET ORAL ONCE
Qty: 0 | Refills: 0 | Status: COMPLETED | OUTPATIENT
Start: 2018-11-07 | End: 2018-11-07

## 2018-11-07 RX ORDER — FUROSEMIDE 40 MG
20 TABLET ORAL ONCE
Qty: 0 | Refills: 0 | Status: COMPLETED | OUTPATIENT
Start: 2018-11-07 | End: 2018-11-07

## 2018-11-07 RX ORDER — DEXTROSE 50 % IN WATER 50 %
25 SYRINGE (ML) INTRAVENOUS ONCE
Qty: 0 | Refills: 0 | Status: COMPLETED | OUTPATIENT
Start: 2018-11-07 | End: 2018-11-07

## 2018-11-07 RX ORDER — FUROSEMIDE 40 MG
40 TABLET ORAL EVERY 12 HOURS
Qty: 0 | Refills: 0 | Status: DISCONTINUED | OUTPATIENT
Start: 2018-11-07 | End: 2018-11-08

## 2018-11-07 RX ADMIN — Medication 40 MILLIGRAM(S): at 19:37

## 2018-11-07 RX ADMIN — INSULIN GLARGINE 65 UNIT(S): 100 INJECTION, SOLUTION SUBCUTANEOUS at 22:50

## 2018-11-07 RX ADMIN — Medication 50 MICROGRAM(S): at 06:04

## 2018-11-07 RX ADMIN — TICAGRELOR 90 MILLIGRAM(S): 90 TABLET ORAL at 06:04

## 2018-11-07 RX ADMIN — ATORVASTATIN CALCIUM 40 MILLIGRAM(S): 80 TABLET, FILM COATED ORAL at 21:43

## 2018-11-07 RX ADMIN — MONTELUKAST 10 MILLIGRAM(S): 4 TABLET, CHEWABLE ORAL at 21:43

## 2018-11-07 RX ADMIN — SODIUM CHLORIDE 3 MILLILITER(S): 9 INJECTION INTRAMUSCULAR; INTRAVENOUS; SUBCUTANEOUS at 21:42

## 2018-11-07 RX ADMIN — Medication 650 MILLIGRAM(S): at 09:42

## 2018-11-07 RX ADMIN — Medication 30 UNIT(S): at 13:20

## 2018-11-07 RX ADMIN — Medication 25 MILLIGRAM(S): at 19:37

## 2018-11-07 RX ADMIN — Medication 650 MILLIGRAM(S): at 09:54

## 2018-11-07 RX ADMIN — SODIUM CHLORIDE 3 MILLILITER(S): 9 INJECTION INTRAMUSCULAR; INTRAVENOUS; SUBCUTANEOUS at 06:02

## 2018-11-07 RX ADMIN — Medication 81 MILLIGRAM(S): at 21:43

## 2018-11-07 RX ADMIN — Medication 25 MILLIGRAM(S): at 06:04

## 2018-11-07 RX ADMIN — Medication 20 MILLIGRAM(S): at 09:54

## 2018-11-07 RX ADMIN — TICAGRELOR 90 MILLIGRAM(S): 90 TABLET ORAL at 19:37

## 2018-11-07 RX ADMIN — SODIUM CHLORIDE 3 MILLILITER(S): 9 INJECTION INTRAMUSCULAR; INTRAVENOUS; SUBCUTANEOUS at 12:22

## 2018-11-07 RX ADMIN — Medication 325 MILLIGRAM(S): at 19:37

## 2018-11-07 RX ADMIN — PANTOPRAZOLE SODIUM 40 MILLIGRAM(S): 20 TABLET, DELAYED RELEASE ORAL at 06:04

## 2018-11-07 RX ADMIN — Medication 25 GRAM(S): at 08:00

## 2018-11-07 RX ADMIN — HEPARIN SODIUM 5000 UNIT(S): 5000 INJECTION INTRAVENOUS; SUBCUTANEOUS at 19:37

## 2018-11-07 RX ADMIN — HEPARIN SODIUM 5000 UNIT(S): 5000 INJECTION INTRAVENOUS; SUBCUTANEOUS at 06:04

## 2018-11-07 NOTE — PROGRESS NOTE ADULT - SUBJECTIVE AND OBJECTIVE BOX
Chief complaint  Patient is a 72y old  Female who presents with a chief complaint of pino (07 Nov 2018 07:53)   Review of systems  Patient in bed, looks comfortable, no fever, no hypoglycemia.    Labs and Fingersticks  CAPILLARY BLOOD GLUCOSE      POCT Blood Glucose.: 147 mg/dL (07 Nov 2018 12:35)  POCT Blood Glucose.: 150 mg/dL (07 Nov 2018 09:26)  POCT Blood Glucose.: 309 mg/dL (07 Nov 2018 08:03)  POCT Blood Glucose.: 68 mg/dL (07 Nov 2018 07:46)  POCT Blood Glucose.: 273 mg/dL (06 Nov 2018 21:10)  POCT Blood Glucose.: 190 mg/dL (06 Nov 2018 17:28)          Calcium, Total Serum: 9.0 (11-07 @ 06:40)  Calcium, Total Serum: 9.4 (11-06 @ 03:30)          11-07    143  |  109<H>  |  39<H>  ----------------------------<  53<L>  4.5   |  21<L>  |  1.76<H>    Ca    9.0      07 Nov 2018 06:40  Mg     2.3     11-06                          10.5   7.78  )-----------( 243      ( 07 Nov 2018 06:40 )             32.7     Medications  MEDICATIONS  (STANDING):  aspirin enteric coated 81 milliGRAM(s) Oral at bedtime  atorvastatin 40 milliGRAM(s) Oral at bedtime  dextrose 5%. 1000 milliLiter(s) (50 mL/Hr) IV Continuous <Continuous>  dextrose 50% Injectable 12.5 Gram(s) IV Push once  dextrose 50% Injectable 25 Gram(s) IV Push once  dextrose 50% Injectable 25 Gram(s) IV Push once  ferrous    sulfate 325 milliGRAM(s) Oral daily  furosemide    Tablet 40 milliGRAM(s) Oral every 12 hours  heparin  Injectable 5000 Unit(s) SubCutaneous every 12 hours  influenza   Vaccine 0.5 milliLiter(s) IntraMuscular once  insulin glargine Injectable (LANTUS) 65 Unit(s) SubCutaneous at bedtime  insulin lispro Injectable (HumaLOG) 30 Unit(s) SubCutaneous three times a day before meals  levothyroxine 50 MICROGram(s) Oral daily  metoprolol tartrate 25 milliGRAM(s) Oral two times a day  montelukast 10 milliGRAM(s) Oral at bedtime  pantoprazole    Tablet 40 milliGRAM(s) Oral before breakfast  sodium chloride 0.9% lock flush 3 milliLiter(s) IV Push every 8 hours  ticagrelor 90 milliGRAM(s) Oral two times a day      Physical Exam  General: Patient comfortable in bed  Vital Signs Last 12 Hrs  T(F): 97.4 (11-07-18 @ 12:37), Max: 98 (11-07-18 @ 06:01)  HR: 69 (11-07-18 @ 12:37) (69 - 69)  BP: 98/55 (11-07-18 @ 12:37) (98/55 - 107/60)  BP(mean): --  RR: 17 (11-07-18 @ 12:37) (17 - 18)  SpO2: 100% (11-07-18 @ 12:37) (100% - 100%)  Neck: No palpable thyroid nodules.  CVS: S1S2, No murmurs  Respiratory: No wheezing, no crepitations  GI: Abdomen soft, bowel sounds positive  Musculoskeletal:  edema lower extremities.   Skin: No skin rashes, no ecchymosis    Diagnostics

## 2018-11-07 NOTE — PROVIDER CONTACT NOTE (OTHER) - ASSESSMENT
vitals stable   no acute distress noted   fall safety maintained  right groin site with saturation of gauze dressing no active bleeding noted pulses palpable positive   femoral and pedal pulses +2

## 2018-11-07 NOTE — PROGRESS NOTE ADULT - SUBJECTIVE AND OBJECTIVE BOX
Patient seen and examined at bedside  No acute events noted overnight  Case discussed with medical team    HPI:  71 y/o F with a PMHx of CAD S/P CABG and stent, ischemic cardiomyopathy with moderate LV dysfunction (EF 37%), HTN, HLD, DM, hypothyroidism and GERD presents for cardiac catheretization. Pt states that she has been experiencing intermittent chest heaviness and was sent for a stress test by Dr. Cotto. Pt reports having an abnormal stress test but results have not been provided. Pt denies N/V/D, fevers, chills, cough, palpitations, syncope, dyspnea on exertion, orthopnea, nocturnal paroxysmal dyspnea, edema, cyanosis, heart murmurs, varicosities, phlebitis, claudication. (02 Nov 2018 12:05)      PAST MEDICAL & SURGICAL HISTORY:  GERD (gastroesophageal reflux disease)  CAD (coronary artery disease): s/p CABG  Hypothyroidism  Hyperlipidemia  Diabetes mellitus, type 2  S/P CABG (coronary artery bypass graft)      No Known Allergies       MEDICATIONS  (STANDING):  aspirin enteric coated 81 milliGRAM(s) Oral at bedtime  atorvastatin 40 milliGRAM(s) Oral at bedtime  dextrose 5%. 1000 milliLiter(s) (50 mL/Hr) IV Continuous <Continuous>  dextrose 50% Injectable 25 Gram(s) IV Push once  dextrose 50% Injectable 12.5 Gram(s) IV Push once  dextrose 50% Injectable 25 Gram(s) IV Push once  dextrose 50% Injectable 25 Gram(s) IV Push once  ferrous    sulfate 325 milliGRAM(s) Oral daily  heparin  Injectable 5000 Unit(s) SubCutaneous every 12 hours  influenza   Vaccine 0.5 milliLiter(s) IntraMuscular once  insulin glargine Injectable (LANTUS) 65 Unit(s) SubCutaneous at bedtime  insulin lispro Injectable (HumaLOG) 30 Unit(s) SubCutaneous three times a day before meals  levothyroxine 50 MICROGram(s) Oral daily  metoprolol tartrate 25 milliGRAM(s) Oral two times a day  montelukast 10 milliGRAM(s) Oral at bedtime  pantoprazole    Tablet 40 milliGRAM(s) Oral before breakfast  sodium chloride 0.9% lock flush 3 milliLiter(s) IV Push every 8 hours  ticagrelor 90 milliGRAM(s) Oral two times a day    MEDICATIONS  (PRN):  aluminum hydroxide/magnesium hydroxide/simethicone Suspension 30 milliLiter(s) Oral every 4 hours PRN Dyspepsia  dextrose 40% Gel 15 Gram(s) Oral once PRN Blood Glucose LESS THAN 70 milliGRAM(s)/deciliter  glucagon  Injectable 1 milliGRAM(s) IntraMuscular once PRN Glucose LESS THAN 70 milligrams/deciliter      REVIEW OF SYSTEMS:  CONSTITUTIONAL: (+) malaise.   EYES: No acute change in vision   ENT:  No tinnitus  NECK: No stiffness  RESPIRATORY:pino. No hemoptysis  CARDIOVASCULAR: No active chest pain, palpitations, syncope  GASTROINTESTINAL: No hematemesis, diarrhea, melena, or hematochezia.  GENITOURINARY: No hematuria  NEUROLOGICAL: No headaches  LYMPH Nodes: No enlarged glands  ENDOCRINE: No heat or cold intolerance	    T(C): 36.7 (11-07-18 @ 06:01), Max: 36.7 (11-06-18 @ 14:34)  HR: 69 (11-07-18 @ 06:01) (69 - 75)  BP: 107/60 (11-07-18 @ 06:01) (107/60 - 110/70)  RR: 18 (11-07-18 @ 06:01) (16 - 18)  SpO2: 100% (11-07-18 @ 06:01) (98% - 100%)    PHYSICAL EXAMINATION:   Constitutional: WD, NAD  HEENT: NC, AT  Neck:  Supple  Respiratory:  Adequate airflow b/l. Not using accessory muscles of respiration.  Cardiovascular:  S1 & S2 intact, systolic murmur, no R/G, 2+ radial pulses b/l  Gastrointestinal: Soft, NT, ND, normoactive b.s., no organomegaly/RT/rigidity  Extremities: WWP  Neurological:  Alert and awake.  No acute focal motor deficits. Crude sensation intact.     Labs and imaging reviewed    LABS:                        10.5   7.78  )-----------( 243      ( 07 Nov 2018 06:40 )             32.7     11-07    143  |  109<H>  |  39<H>  ----------------------------<  53<L>  4.5   |  21<L>  |  1.76<H>    Ca    9.0      07 Nov 2018 06:40  Mg     2.3     11-06              CAPILLARY BLOOD GLUCOSE      POCT Blood Glucose.: 68 mg/dL (07 Nov 2018 07:46)  POCT Blood Glucose.: 273 mg/dL (06 Nov 2018 21:10)  POCT Blood Glucose.: 190 mg/dL (06 Nov 2018 17:28)  POCT Blood Glucose.: 168 mg/dL (06 Nov 2018 12:28)  POCT Blood Glucose.: 136 mg/dL (06 Nov 2018 08:56)              RADIOLOGY & ADDITIONAL STUDIES:

## 2018-11-07 NOTE — PROVIDER CONTACT NOTE (OTHER) - RECOMMENDATIONS
ok to gvie medications at this time
apply pressure to site
as per tele PA apply pressure to groin site

## 2018-11-07 NOTE — PROVIDER CONTACT NOTE (OTHER) - ACTION/TREATMENT ORDERED:
ok to gvie medications at this time
pressure applied awaiting tele PA arrival
pressure applied awaiting tele PA assessment

## 2018-11-07 NOTE — PROGRESS NOTE ADULT - SUBJECTIVE AND OBJECTIVE BOX
REASON FOR CONSULTATION: Cardiac catheterization    HISTORY OF PRESENT ILLNESS:  The patient is a 72yearold male with past medical history significant for coronary artery disease status post CABG and stent, ischemic cardiomyopathy with moderate left ventricular dysfunction (EF 37%), hypertension, hyperlipidemia, diabetes mellitus, hypothyroidism, and GERD who presents for cardiac catheterization after having an abnormal nuclear stress test in our office.  Patient status post cardiac catheterization which reveals 90% stenosis to ramus at prior stent site.  Patient received a percutaneous intervention to the severe ISR of ramus.  patient states that her chest discomfort has resolved.  Patient denies any shortness of breath or palpitations.    PAST MEDICAL HISTORY:  Coronary artery disease status post CABG and stent, ischemic cardiomyopathy with moderate left ventricular dysfunction (EF 37%), hypertension, hyperlipidemia, diabetes mellitus, hypothyroidism, and GERD.    PAST SURGICAL HISTORY:  coronary artery bypass grafting    MEDICATIONS:  Reviewed.    ALLERGIES:  No known allergies    SOCIAL HISTORY:  Denies any tobacco, alcohol, or drug abuse.    FAMILY HISTORY:  significant of diabetes mellitus and hypertension    REVIEW OF SYSTEMS:  Reviewed.    PHYSICAL EXAMINATION:  GENERAL:  The patient is comfortable, in no acute distress.  VITAL SIGNS:  Blood pressure is 117/60, heart rate 70, respiratory rate 18, afebrile.  HEENT:  Normocephalic and atraumatic.  NECK:  No jugular venous distention.  CARDIOVASCULAR:  S1 and S2, regular rate and irrhythm.  PULMONARY:  Clear to auscultation.  ABDOMEN:  Positive bowel sounds, soft, nontender, nondistended.  EXTREMITIES:  No edema.    LABORATORY DATA:  Reviewed.    EKG:  Normal sinus rhythm    TELE MONITOR:  Normal sinus rhythm      ASSESSMENT:    1. Coronary artery disease, status post cardiac catheterization with PCI    2. Ischemic cardiomyopathy     3. Hyperlipidemia    4. Hypertension, controlled    5. Diabetes mellitus      PLAN:    1. continue medical therapy with dual antiplatelets and statins    2. continue medical management  with beta blocker therapy, dual antiplatelets, and statins  .       Plan discussed with Dr. Luis Cotto    Thank you for the courtesy of this referral.

## 2018-11-07 NOTE — PROGRESS NOTE ADULT - ASSESSMENT
71 y/o F with a PMHx of CAD S/P CABG and stent, ischemic cardiomyopathy with moderate LV dysfunction (EF 37%), HTN, HLD, DM, hypothyroidism and GERD presents for cardiac catheretization with c/o chest heaviness and pino    -> MERVIN on CKD 3:      - Will f/u am labs. Holding lasix.  Avoiding nephrotoxic rx  -> History of CAD and Chest Pain:       - Cardiac cath.      - C/w cardiac meds      - Tele. PT.       - Cardiology management appreciated   -> CHFrEF:       - Improved. Euvolemic. C/w cardiac meds   -> DM II:       - With longterm complications of hyperglycemia. C/w ihss. Monitor acks. DM education  -> Need for prophylactic measure: dvt/gi ppx

## 2018-11-07 NOTE — PROGRESS NOTE ADULT - PROBLEM SELECTOR PLAN 1
baseline ~ 1.5-1.8  Pt improvement in renal function today  Recommend to continue to hold lasix  Pt planned for cath tomorrow   s/p cath 11/7  Cleared from renal stand point if renal function remains stable tomorrow   Monitor bmp    Avoid nephrotoxic agents baseline ~ 1.5-1.8  Pt improvement in renal function today  Recommend to continue to hold lasix  s/p cath 11/7, monitor renal function closely  Monitor bmp    Avoid nephrotoxic agents

## 2018-11-07 NOTE — PROVIDER CONTACT NOTE (OTHER) - BACKGROUND
(Admit Diagnosis) Chronic ischemic heart disease  (PMH) GERD (gastroesophageal reflux disease)  (PMH) CAD (coronary artery disease)  (PMH) Hypothyroidism

## 2018-11-07 NOTE — CHART NOTE - NSCHARTNOTEFT_GEN_A_CORE
called by RN due to RFA site oozing red blood. Patient was ambulating, when she felt some oozing at cath site.  Denies pain at site, numbness, no back pain. After applying pressure for 15 minutes, RFA pulse present, no more oozing, site clean and dry, clean dressing applied.

## 2018-11-07 NOTE — PROVIDER CONTACT NOTE (OTHER) - SITUATION
ok to gvie medications at this time
patient noted after ambulation to bathroom with saturated right groin dressing s/p cath today
pt stated on  phone burning in chest that is chronic and heaviness    Teena   [phone]

## 2018-11-08 ENCOUNTER — TRANSCRIPTION ENCOUNTER (OUTPATIENT)
Age: 73
End: 2018-11-08

## 2018-11-08 VITALS
TEMPERATURE: 99 F | OXYGEN SATURATION: 98 % | HEART RATE: 77 BPM | SYSTOLIC BLOOD PRESSURE: 112 MMHG | DIASTOLIC BLOOD PRESSURE: 70 MMHG | RESPIRATION RATE: 18 BRPM

## 2018-11-08 LAB
BUN SERPL-MCNC: 39 MG/DL — HIGH (ref 7–23)
CALCIUM SERPL-MCNC: 9.1 MG/DL — SIGNIFICANT CHANGE UP (ref 8.4–10.5)
CHLORIDE SERPL-SCNC: 101 MMOL/L — SIGNIFICANT CHANGE UP (ref 98–107)
CO2 SERPL-SCNC: 20 MMOL/L — LOW (ref 22–31)
CREAT SERPL-MCNC: 1.89 MG/DL — HIGH (ref 0.5–1.3)
GLUCOSE SERPL-MCNC: 173 MG/DL — HIGH (ref 70–99)
HCT VFR BLD CALC: 33.2 % — LOW (ref 34.5–45)
HGB BLD-MCNC: 10.9 G/DL — LOW (ref 11.5–15.5)
MCHC RBC-ENTMCNC: 29.4 PG — SIGNIFICANT CHANGE UP (ref 27–34)
MCHC RBC-ENTMCNC: 32.8 % — SIGNIFICANT CHANGE UP (ref 32–36)
MCV RBC AUTO: 89.5 FL — SIGNIFICANT CHANGE UP (ref 80–100)
NRBC # FLD: 0 — SIGNIFICANT CHANGE UP
PLATELET # BLD AUTO: 263 K/UL — SIGNIFICANT CHANGE UP (ref 150–400)
PMV BLD: 9.1 FL — SIGNIFICANT CHANGE UP (ref 7–13)
POTASSIUM SERPL-MCNC: 4.1 MMOL/L — SIGNIFICANT CHANGE UP (ref 3.5–5.3)
POTASSIUM SERPL-SCNC: 4.1 MMOL/L — SIGNIFICANT CHANGE UP (ref 3.5–5.3)
RBC # BLD: 3.71 M/UL — LOW (ref 3.8–5.2)
RBC # FLD: 14.7 % — HIGH (ref 10.3–14.5)
SODIUM SERPL-SCNC: 138 MMOL/L — SIGNIFICANT CHANGE UP (ref 135–145)
WBC # BLD: 9.14 K/UL — SIGNIFICANT CHANGE UP (ref 3.8–10.5)
WBC # FLD AUTO: 9.14 K/UL — SIGNIFICANT CHANGE UP (ref 3.8–10.5)

## 2018-11-08 RX ORDER — INSULIN DEGLUDEC 100 U/ML
75 INJECTION, SOLUTION SUBCUTANEOUS
Qty: 0 | Refills: 0 | COMMUNITY

## 2018-11-08 RX ORDER — ALENDRONATE SODIUM 70 MG/1
1 TABLET ORAL
Qty: 0 | Refills: 0 | COMMUNITY

## 2018-11-08 RX ORDER — FUROSEMIDE 40 MG
1 TABLET ORAL
Qty: 0 | Refills: 0 | DISCHARGE
Start: 2018-11-08

## 2018-11-08 RX ORDER — INSULIN LISPRO 100/ML
30 VIAL (ML) SUBCUTANEOUS
Qty: 0 | Refills: 0 | DISCHARGE
Start: 2018-11-08

## 2018-11-08 RX ADMIN — TICAGRELOR 90 MILLIGRAM(S): 90 TABLET ORAL at 06:24

## 2018-11-08 RX ADMIN — PANTOPRAZOLE SODIUM 40 MILLIGRAM(S): 20 TABLET, DELAYED RELEASE ORAL at 06:24

## 2018-11-08 RX ADMIN — SODIUM CHLORIDE 3 MILLILITER(S): 9 INJECTION INTRAMUSCULAR; INTRAVENOUS; SUBCUTANEOUS at 11:38

## 2018-11-08 RX ADMIN — Medication 25 MILLIGRAM(S): at 06:24

## 2018-11-08 RX ADMIN — HEPARIN SODIUM 5000 UNIT(S): 5000 INJECTION INTRAVENOUS; SUBCUTANEOUS at 06:25

## 2018-11-08 RX ADMIN — Medication 30 UNIT(S): at 09:28

## 2018-11-08 RX ADMIN — Medication 50 MICROGRAM(S): at 06:24

## 2018-11-08 RX ADMIN — Medication 40 MILLIGRAM(S): at 06:24

## 2018-11-08 RX ADMIN — Medication 325 MILLIGRAM(S): at 18:12

## 2018-11-08 RX ADMIN — Medication 30 UNIT(S): at 13:32

## 2018-11-08 RX ADMIN — Medication 30 UNIT(S): at 18:13

## 2018-11-08 RX ADMIN — SODIUM CHLORIDE 3 MILLILITER(S): 9 INJECTION INTRAMUSCULAR; INTRAVENOUS; SUBCUTANEOUS at 06:23

## 2018-11-08 RX ADMIN — Medication 25 MILLIGRAM(S): at 18:13

## 2018-11-08 RX ADMIN — HEPARIN SODIUM 5000 UNIT(S): 5000 INJECTION INTRAVENOUS; SUBCUTANEOUS at 18:13

## 2018-11-08 RX ADMIN — Medication 40 MILLIGRAM(S): at 18:13

## 2018-11-08 RX ADMIN — TICAGRELOR 90 MILLIGRAM(S): 90 TABLET ORAL at 18:12

## 2018-11-08 NOTE — PROGRESS NOTE ADULT - SUBJECTIVE AND OBJECTIVE BOX
REASON FOR CONSULTATION: Cardiac catheterization    HISTORY OF PRESENT ILLNESS:  The patient is a 72yearold male with past medical history significant for coronary artery disease status post CABG and stent, ischemic cardiomyopathy with moderate left ventricular dysfunction (EF 37%), hypertension, hyperlipidemia, diabetes mellitus, hypothyroidism, and GERD who presents for cardiac catheterization after having an abnormal nuclear stress test in our office.  Patient status post cardiac catheterization which reveals 90% stenosis to ramus at prior stent site.  Patient received a percutaneous intervention to the severe ISR of ramus. Patient complains of chest burning which she has had in the past.  Patient states that these symptoms are not new and they come and go.  Patient denies any shortness of breath or palpitations.  As per RN overnight small blood was noted on right groin and pressure was applied.    PAST MEDICAL HISTORY:  Coronary artery disease status post CABG and stent, ischemic cardiomyopathy with moderate left ventricular dysfunction (EF 37%), hypertension, hyperlipidemia, diabetes mellitus, hypothyroidism, and GERD.    PAST SURGICAL HISTORY:  coronary artery bypass grafting    MEDICATIONS:  Reviewed.    ALLERGIES:  No known allergies    SOCIAL HISTORY:  Denies any tobacco, alcohol, or drug abuse.    FAMILY HISTORY:  Significant of diabetes mellitus and hypertension    REVIEW OF SYSTEMS:  Reviewed.    PHYSICAL EXAMINATION:  GENERAL:  The patient is comfortable, in no acute distress.  VITAL SIGNS:  Blood pressure is 107/62, heart rate 74, respiratory rate 17, afebrile.  HEENT:  Normocephalic and atraumatic.  NECK:  No jugular venous distention.  CARDIOVASCULAR:  S1 and S2, regular rate and irrhythm.  PULMONARY:  Clear to auscultation.  ABDOMEN:  Positive bowel sounds, soft, nontender, nondistended.  EXTREMITIES:  No edema, right groin soft nontender, no bleeding, no hematoma, positive pedal pulses.    LABORATORY DATA:  Reviewed.    EKG:  Normal sinus rhythm    TELE MONITOR:  Normal sinus rhythm      ASSESSMENT:      1. Coronary artery disease, status post cardiac catheterization with PCI, Stable     2. Ischemic cardiomyopathy     3. Hyperlipidemia    4. Hypertension, controlled    5. Diabetes mellitus    6. GERD    PLAN:    1. Continue medical therapy with dual antiplatelets and statins    2. Continue medical management  with beta blocker therapy, dual antiplatelets, and statins  .   3. Patient's symptoms likely due to gastroesophageal reflux disease, continue PPI        Plan discussed with Dr. Luis Cotto    Thank you for the courtesy of this referral.

## 2018-11-08 NOTE — DISCHARGE NOTE ADULT - HOME CARE AGENCY
[facility]     RN to start day after discharge / [facility] CDP[facility] Aide reinstated for 11/8/18

## 2018-11-08 NOTE — PROGRESS NOTE ADULT - ASSESSMENT
73 y/o F with a PMHx of CAD S/P CABG and stent, ischemic cardiomyopathy with moderate LV dysfunction (EF 37%), HTN, HLD, DM, hypothyroidism and GERD presents for cardiac catheretization with c/o chest heaviness and pino

## 2018-11-08 NOTE — DISCHARGE NOTE ADULT - HOSPITAL COURSE
73 y/o F with a PMHx of CAD S/P CABG and stent, ischemic cardiomyopathy with moderate LV dysfunction (EF 37%), HTN, HLD, DM, hypothyroidism and GERD presents for cardiac catheretization with c/o chest heaviness and pino    -> CAD and Angina Pectoris:       - s/p cardiac cath with CORNELIA X 1. No complications.       - C/w cardiac meds      - D/c home with close outpt f/u      - Cardiology management appreciated   -> MERVIN on CKD 3:      - improving.  Avoiding nephrotoxic rx  -> CHFrEF:       - Improved. Euvolemic. C/w cardiac meds   -> DM II:       - With longterm complications of hyperglycemia. C/w ihss. Monitor acks. DM education  -> Need for prophylactic measure: dvt/gi ppx   11/8- As per attending, patient stable for discharge.

## 2018-11-08 NOTE — PROGRESS NOTE ADULT - SUBJECTIVE AND OBJECTIVE BOX
Chief complaint  Patient is a 72y old  Female who presents with a chief complaint of pino (08 Nov 2018 07:34)   Review of systems  Patient in bed, looks comfortable, no fever,  no hypoglycemia.    Labs and Fingersticks  CAPILLARY BLOOD GLUCOSE      POCT Blood Glucose.: 187 mg/dL (08 Nov 2018 08:58)  POCT Blood Glucose.: 322 mg/dL (07 Nov 2018 22:11)  POCT Blood Glucose.: 109 mg/dL (07 Nov 2018 17:06)  POCT Blood Glucose.: 147 mg/dL (07 Nov 2018 12:35)          Calcium, Total Serum: 9.1 (11-08 @ 07:07)  Calcium, Total Serum: 9.0 (11-07 @ 06:40)          11-08    138  |  101  |  39<H>  ----------------------------<  173<H>  4.1   |  20<L>  |  1.89<H>    Ca    9.1      08 Nov 2018 07:07                          10.9   9.14  )-----------( 263      ( 08 Nov 2018 07:07 )             33.2     Medications  MEDICATIONS  (STANDING):  aspirin enteric coated 81 milliGRAM(s) Oral at bedtime  atorvastatin 40 milliGRAM(s) Oral at bedtime  dextrose 5%. 1000 milliLiter(s) (50 mL/Hr) IV Continuous <Continuous>  dextrose 50% Injectable 12.5 Gram(s) IV Push once  dextrose 50% Injectable 25 Gram(s) IV Push once  dextrose 50% Injectable 25 Gram(s) IV Push once  ferrous    sulfate 325 milliGRAM(s) Oral daily  furosemide    Tablet 40 milliGRAM(s) Oral every 12 hours  heparin  Injectable 5000 Unit(s) SubCutaneous every 12 hours  influenza   Vaccine 0.5 milliLiter(s) IntraMuscular once  insulin glargine Injectable (LANTUS) 65 Unit(s) SubCutaneous at bedtime  insulin lispro Injectable (HumaLOG) 30 Unit(s) SubCutaneous three times a day before meals  levothyroxine 50 MICROGram(s) Oral daily  metoprolol tartrate 25 milliGRAM(s) Oral two times a day  montelukast 10 milliGRAM(s) Oral at bedtime  pantoprazole    Tablet 40 milliGRAM(s) Oral before breakfast  sodium chloride 0.9% lock flush 3 milliLiter(s) IV Push every 8 hours  ticagrelor 90 milliGRAM(s) Oral two times a day      Physical Exam  General: Patient comfortable in bed  Vital Signs Last 12 Hrs  T(F): 97.8 (11-08-18 @ 06:22), Max: 97.8 (11-08-18 @ 06:22)  HR: 74 (11-08-18 @ 06:22) (74 - 74)  BP: 107/62 (11-08-18 @ 06:22) (107/62 - 107/62)  BP(mean): --  RR: 17 (11-08-18 @ 06:22) (17 - 17)  SpO2: 100% (11-08-18 @ 06:22) (100% - 100%)  Neck: No palpable thyroid nodules.  CVS: S1S2, No murmurs  Respiratory: No wheezing, no crepitations  GI: Abdomen soft, bowel sounds positive  Musculoskeletal:  edema lower extremities.   Skin: No skin rashes, no ecchymosis    Diagnostics

## 2018-11-08 NOTE — PROGRESS NOTE ADULT - REASON FOR ADMISSION
cardiac catheterization
cardiac catheterization
Catheterization.
ZEE anginal equivalent
pino
pino and chest pain
pino and chest pain

## 2018-11-08 NOTE — DISCHARGE NOTE ADULT - PATIENT PORTAL LINK FT
You can access the [facility] Patient Portal, offered by [facility], by registering with the following website: [email or web address]

## 2018-11-08 NOTE — DISCHARGE NOTE ADULT - MEDICATION SUMMARY - MEDICATIONS TO TAKE
I will START or STAY ON the medications listed below when I get home from the hospital:    aspirin 81 mg oral delayed release tablet  -- 1 tab(s) by mouth once a day  -- Indication: For Heart protector    Tresiba FlexTouch 100 units/mL subcutaneous solution  -- 65 unit(s) subcutaneous once a day (at bedtime)  -- Indication: For DM    insulin lispro  -- 30 unit(s) subcutaneous 3 times a day (before meals)  -- Indication: For DM    atorvastatin 40 mg oral tablet  -- 1 tab(s) by mouth once a day  -- Indication: For HLD    Brilinta (ticagrelor) 90 mg oral tablet  -- 1 tab(s) by mouth 2 times a day  -- Indication: For Blood thinner    metoprolol tartrate 25 mg oral tablet  -- 1 tab(s) by mouth 2 times a day  -- It is very important that you take or use this exactly as directed.  Do not skip doses or discontinue unless directed by your doctor.  May cause drowsiness.  Alcohol may intensify this effect.  Use care when operating dangerous machinery.  Some non-prescription drugs may aggravate your condition.  Read all labels carefully.  If a warning appears, check with your doctor before taking.  Take with food or milk.  This drug may impair the ability to drive or operate machinery.  Use care until you become familiar with its effects.    -- Indication: For HTN (hypertension)    furosemide 40 mg oral tablet  -- 1 tab(s) by mouth every 12 hours  -- Indication: For ChF    raNITIdine 150 mg oral capsule  -- 1 cap(s) by mouth 2 times a day  -- Indication: For gerd    FeroSul 325 mg (65 mg elemental iron) oral tablet  -- 1 tab(s) by mouth once a day   -- Indication: For vitamin    montelukast 10 mg oral tablet  -- 1 tab(s) by mouth once a day (at bedtime)  -- Indication: For Breathing    levothyroxine 50 mcg (0.05 mg) oral tablet  -- 1 tab(s) by mouth once a day  -- Indication: For Hypothyroidism

## 2018-11-08 NOTE — DISCHARGE NOTE ADULT - CARE PLAN
Principal Discharge DX:	Chest pain, unspecified  Goal:	Followup with PMD and take all medications prescribed.  Assessment and plan of treatment:	Followup with PMD and take all medications prescribed. Low salt, low fat, low cholesterol, diabetic diet.  Secondary Diagnosis:	Essential hypertension  Goal:	Followup with PMD and take all medications prescribed.  Assessment and plan of treatment:	Followup with PMD and take all medications prescribed. Low salt, low fat, low cholesterol, diabetic diet.  Secondary Diagnosis:	Other hyperlipidemia  Goal:	Followup with PMD and take all medications prescribed.  Assessment and plan of treatment:	Followup with PMD and take all medications prescribed. Low salt, low fat, low cholesterol, diabetic diet.  Secondary Diagnosis:	Diabetes mellitus, type 2  Goal:	Followup with PMD and take all medications prescribed.  Assessment and plan of treatment:	Followup with PMD and take all medications prescribed. Low salt, low fat, low cholesterol, diabetic diet.  Secondary Diagnosis:	Hypothyroidism  Goal:	Followup with PMD and take all medications prescribed.  Assessment and plan of treatment:	Followup with PMD and take all medications prescribed. Low salt, low fat, low cholesterol, diabetic diet.

## 2018-11-08 NOTE — PROGRESS NOTE ADULT - SUBJECTIVE AND OBJECTIVE BOX
[facility]   MD RAHEEL SHAH MD ANGELA WONG, PA    TEL:  OFFICE: [phone]  DR SANTOYO CELL: [phone]  DR. NGUYEN CELL: [phone]  UMM KENDALL CELL: [phone]        Patient is a 72y old  Female who presents with a chief complaint of pino (08 Nov 2018 13:59)      Patient seen and examined at bedside. No chest pain/sob. feeling better today    VITALS:  T(F): 97.4 (11-08-18 @ 12:45), Max: 98 (11-07-18 @ 17:13)  HR: 70 (11-08-18 @ 12:45)  BP: 114/58 (11-08-18 @ 12:45)  RR: 17 (11-08-18 @ 12:45)  SpO2: 100% (11-08-18 @ 12:45)  Wt(kg): --    11-07 @ 07:01  -  11-08 @ 07:00  --------------------------------------------------------  IN: 0 mL / OUT: 600 mL / NET: -600 mL          PHYSICAL EXAM:  Constitutional: NAD  Neck: No JVD  Respiratory: CTAB, no wheezes, rales or rhonchi  Cardiovascular: S1, S2, RRR  Gastrointestinal: BS+, soft, NT/ND  Extremities: No peripheral edema    Hospital Medications:   MEDICATIONS  (STANDING):  aspirin enteric coated 81 milliGRAM(s) Oral at bedtime  atorvastatin 40 milliGRAM(s) Oral at bedtime  dextrose 5%. 1000 milliLiter(s) (50 mL/Hr) IV Continuous <Continuous>  dextrose 50% Injectable 12.5 Gram(s) IV Push once  dextrose 50% Injectable 25 Gram(s) IV Push once  dextrose 50% Injectable 25 Gram(s) IV Push once  ferrous    sulfate 325 milliGRAM(s) Oral daily  furosemide    Tablet 40 milliGRAM(s) Oral every 12 hours  heparin  Injectable 5000 Unit(s) SubCutaneous every 12 hours  influenza   Vaccine 0.5 milliLiter(s) IntraMuscular once  insulin glargine Injectable (LANTUS) 65 Unit(s) SubCutaneous at bedtime  insulin lispro Injectable (HumaLOG) 30 Unit(s) SubCutaneous three times a day before meals  levothyroxine 50 MICROGram(s) Oral daily  metoprolol tartrate 25 milliGRAM(s) Oral two times a day  montelukast 10 milliGRAM(s) Oral at bedtime  pantoprazole    Tablet 40 milliGRAM(s) Oral before breakfast  sodium chloride 0.9% lock flush 3 milliLiter(s) IV Push every 8 hours  ticagrelor 90 milliGRAM(s) Oral two times a day      LABS:  11-08    138  |  101  |  39<H>  ----------------------------<  173<H>  4.1   |  20<L>  |  1.89<H>    Ca    9.1      08 Nov 2018 07:07      Creatinine Trend: 1.89 <--, 1.76 <--, 2.05 <--, 2.11 <--, 1.93 <--, 1.82 <--, 1.50 <--                                10.9   9.14  )-----------( 263      ( 08 Nov 2018 07:07 )             33.2     Urine Studies:      Iron 40, TIBC 377, %sat --      [01-20-18 @ 06:06]  Ferritin 38.35      [01-20-18 @ 06:06]  PTH 43.55 (Ca --)      [09-10-18 @ 07:15]   --  PTH 36.60 (Ca --)      [09-08-18 @ 03:15]   --  Vitamin D (25OH) 15.8      [09-08-18 @ 03:15]  HbA1c 7.8      [11-02-18 @ 12:00]  TSH 2.03      [09-25-18 @ 04:20]  Lipid: chol 132, , HDL 31, LDL 78      [09-25-18 @ 04:20]        RADIOLOGY & ADDITIONAL STUDIES:

## 2018-11-08 NOTE — PROGRESS NOTE ADULT - PROBLEM SELECTOR PLAN 1
baseline ~ 1.5-1.8  renal function stable. s/p angiogram 11/7  lasix 40mg po bid resumed  Monitor bmp    Avoid nephrotoxic agents

## 2018-11-08 NOTE — PROGRESS NOTE ADULT - PROBLEM SELECTOR PLAN 1
Will continue current insulin regimen for now. Will continue monitoring FS, log, will Follow up.  DC home on current basal bolus insulin, FU 2w  Patient counseled for compliance with consistent low carb diet.

## 2018-11-08 NOTE — DISCHARGE NOTE ADULT - MEDICATION SUMMARY - MEDICATIONS TO CHANGE
I will SWITCH the dose or number of times a day I take the medications listed below when I get home from the hospital:    Tresiba FlexTouch 100 units/mL subcutaneous solution  -- 75 unit(s) subcutaneous once a day (at bedtime)

## 2018-11-08 NOTE — PROGRESS NOTE ADULT - ASSESSMENT
71 y/o F with a PMHx of CAD S/P CABG and stent, ischemic cardiomyopathy with moderate LV dysfunction (EF 37%), HTN, HLD, DM, hypothyroidism and GERD presents for cardiac catheretization with c/o chest heaviness and pino    -> CAD and Angina Pectoris:       - s/p cardiac cath with CORNELIA X 1. No complications.       - C/w cardiac meds      - D/c home with close outpt f/u      - Cardiology management appreciated   -> MERVIN on CKD 3:      - improving.  Avoiding nephrotoxic rx  -> CHFrEF:       - Improved. Euvolemic. C/w cardiac meds   -> DM II:       - With longterm complications of hyperglycemia. C/w ihss. Monitor acks. DM education  -> Need for prophylactic measure: dvt/gi ppx

## 2018-11-08 NOTE — PROGRESS NOTE ADULT - SUBJECTIVE AND OBJECTIVE BOX
Patient seen and examined at bedside  s/p cardiac cath with CORNELIA X 1  Case discussed with medical team    HPI:  71 y/o F with a PMHx of CAD S/P CABG and stent, ischemic cardiomyopathy with moderate LV dysfunction (EF 37%), HTN, HLD, DM, hypothyroidism and GERD presents for cardiac catheretization. Pt states that she has been experiencing intermittent chest heaviness and was sent for a stress test by Dr. Cotto. Pt reports having an abnormal stress test but results have not been provided. Pt denies N/V/D, fevers, chills, cough, palpitations, syncope, dyspnea on exertion, orthopnea, nocturnal paroxysmal dyspnea, edema, cyanosis, heart murmurs, varicosities, phlebitis, claudication. (02 Nov 2018 12:05)      PAST MEDICAL & SURGICAL HISTORY:  GERD (gastroesophageal reflux disease)  CAD (coronary artery disease): s/p CABG  Hypothyroidism  Hyperlipidemia  Diabetes mellitus, type 2  S/P CABG (coronary artery bypass graft)      No Known Allergies       MEDICATIONS  (STANDING):  aspirin enteric coated 81 milliGRAM(s) Oral at bedtime  atorvastatin 40 milliGRAM(s) Oral at bedtime  dextrose 5%. 1000 milliLiter(s) (50 mL/Hr) IV Continuous <Continuous>  dextrose 50% Injectable 12.5 Gram(s) IV Push once  dextrose 50% Injectable 25 Gram(s) IV Push once  dextrose 50% Injectable 25 Gram(s) IV Push once  ferrous    sulfate 325 milliGRAM(s) Oral daily  furosemide    Tablet 40 milliGRAM(s) Oral every 12 hours  heparin  Injectable 5000 Unit(s) SubCutaneous every 12 hours  influenza   Vaccine 0.5 milliLiter(s) IntraMuscular once  insulin glargine Injectable (LANTUS) 65 Unit(s) SubCutaneous at bedtime  insulin lispro Injectable (HumaLOG) 30 Unit(s) SubCutaneous three times a day before meals  levothyroxine 50 MICROGram(s) Oral daily  metoprolol tartrate 25 milliGRAM(s) Oral two times a day  montelukast 10 milliGRAM(s) Oral at bedtime  pantoprazole    Tablet 40 milliGRAM(s) Oral before breakfast  sodium chloride 0.9% lock flush 3 milliLiter(s) IV Push every 8 hours  ticagrelor 90 milliGRAM(s) Oral two times a day    MEDICATIONS  (PRN):  aluminum hydroxide/magnesium hydroxide/simethicone Suspension 30 milliLiter(s) Oral every 4 hours PRN Dyspepsia  dextrose 40% Gel 15 Gram(s) Oral once PRN Blood Glucose LESS THAN 70 milliGRAM(s)/deciliter  glucagon  Injectable 1 milliGRAM(s) IntraMuscular once PRN Glucose LESS THAN 70 milligrams/deciliter      REVIEW OF SYSTEMS:  CONSTITUTIONAL: (+) malaise.   EYES: No acute change in vision   ENT:  No tinnitus  NECK: No stiffness  RESPIRATORY: No hemoptysis  CARDIOVASCULAR: No chest pain, palpitations, syncope  GASTROINTESTINAL: No hematemesis, diarrhea, melena, or hematochezia.  GENITOURINARY: No hematuria  NEUROLOGICAL: No headaches  LYMPH Nodes: No enlarged glands  ENDOCRINE: No heat or cold intolerance	    T(C): 36.6 (11-08-18 @ 06:22), Max: 36.7 (11-07-18 @ 17:13)  HR: 74 (11-08-18 @ 06:22) (69 - 74)  BP: 107/62 (11-08-18 @ 06:22) (98/55 - 123/66)  RR: 17 (11-08-18 @ 06:22) (16 - 18)  SpO2: 100% (11-08-18 @ 06:22) (100% - 100%)    PHYSICAL EXAMINATION:   Constitutional: WD, NAD  HEENT: NC, AT  Neck:  Supple  Respiratory:  Adequate airflow b/l. Not using accessory muscles of respiration.  Cardiovascular:  S1 & S2 intact, no R/G, 2+ radial pulses b/l  Gastrointestinal: Soft, NT, ND, normoactive b.s., no organomegaly/RT/rigidity  Extremities: WWP  Neurological:  Alert and awake.  No acute focal motor deficits. Crude sensation intact.     Labs and imaging reviewed    LABS:                        10.5   7.78  )-----------( 243      ( 07 Nov 2018 06:40 )             32.7     11-07    143  |  109<H>  |  39<H>  ----------------------------<  53<L>  4.5   |  21<L>  |  1.76<H>    Ca    9.0      07 Nov 2018 06:40              CAPILLARY BLOOD GLUCOSE      POCT Blood Glucose.: 322 mg/dL (07 Nov 2018 22:11)  POCT Blood Glucose.: 109 mg/dL (07 Nov 2018 17:06)  POCT Blood Glucose.: 147 mg/dL (07 Nov 2018 12:35)  POCT Blood Glucose.: 150 mg/dL (07 Nov 2018 09:26)  POCT Blood Glucose.: 309 mg/dL (07 Nov 2018 08:03)  POCT Blood Glucose.: 68 mg/dL (07 Nov 2018 07:46)              RADIOLOGY & ADDITIONAL STUDIES:

## 2018-12-08 NOTE — ED ADULT TRIAGE NOTE - ESI TRIAGE ACUITY LEVEL, MLM
Telephone Encounter by Debra Regan at 04/03/17 10:18 AM     Author:  Debra Regan Service:  (none) Author Type:  Patient      Filed:  04/03/17 10:21 AM Encounter Date:  4/3/2017 Status:  Signed     :  Debra Regan (Patient )              DOMINIC SALDIVAR    Patient Age: 65 year old    ACCT STATUS: COPAY  MESSAGE:[AR1.1T] Pt calling to schedule a ER follow up. Pt states he still has pain and discomfort in the stomach and bowel movements are not coming very often.[AR1.1M] Call transferred to clinical staff member.      Next and Last Visit with Provider and Department  Next visit with KYMBERLY SETHI is on No match found  Next visit with GASTROENTEROLOGY is on No match found  Last visit with KYMBERLY SETHI was on No match found  Last visit with GASTROENTEROLOGY was on No match found     WEIGHT AND HEIGHT: As of 04/01/2017 weight is 188 lbs.(85.276 kg). Height is 5' 9\"(1.753 m).   BMI is 27.75 kg/(m^2) calculated from:     Height 5' 9\" (1.753 m) as of 4/1/17     Weight 188 lb (85.276 kg) as of 4/1/17      No Known Allergies  Current outpatient prescriptions       Medication  Sig Dispense Refill   • CARTIA  MG 24 hr Cap      • furosemide (LASIX) 40 MG tablet      • levofloxacin (LEVAQUIN) 750 MG tablet      • lisinopril (PRINIVIL,ZESTRIL) 20 MG tablet      • potassium chloride SA (K-DUR,KLOR-CON) 20 MEQ tablet      • GAS RELIEF MAXIMUM STRENGTH 125 MG chewable tablet See Colonoscopy Instructions.  0   • Lactobacillus (FLORANEX OR) Take  by mouth.     • aspirin 325 MG tablet Take 325 mg by mouth daily.     • Bisacodyl (DULCOLAX OR) Take  by mouth.     • omega-3 acid ethyl esters (LOVAZA) 1 G Cap Take 2 Caps by mouth 2 (two) times daily. 120 Cap 5      PHARMACY to use: [address]          Pharmacy preference(s) on file: [address]    CALL BACK INFO:[AR1.1T] Ok to leave response (including medical information) on  answering machine[AR1.1M]  ROUTING:[AR1.1T] Patient's physician/staff[AR1.1M]        PCP: Ifeoma Patel DO         INS: Payor: [employer] / Plan: *No Plan* / Product Type: *No Product type* / Note: This is the primary coverage, but no account was found for this location or the patient's primary location.   ADDRESS:  [address] [place] [address][AR1.1T]         Revision History        User Key Date/Time User Provider Type Action    > AR1.1 04/03/17 10:21 AM Debra Regan Patient  Sign    M - Manual, T - Template             2

## 2019-02-24 ENCOUNTER — RX RENEWAL (OUTPATIENT)
Age: 74
End: 2019-02-24

## 2019-03-06 NOTE — PROVIDER CONTACT NOTE (OTHER) - REASON
patient noted after ambulation to bathroom with saturated right groin dressing s/p cath today
pt stated on  phone burning in chest that is chronic and heaviness
Left arm;
ok to gvie medications at this time

## 2019-03-08 ENCOUNTER — APPOINTMENT (OUTPATIENT)
Dept: ELECTROPHYSIOLOGY | Facility: CLINIC | Age: 74
End: 2019-03-08
Payer: MEDICARE

## 2019-03-08 ENCOUNTER — NON-APPOINTMENT (OUTPATIENT)
Age: 74
End: 2019-03-08

## 2019-03-08 VITALS
RESPIRATION RATE: 18 BRPM | BODY MASS INDEX: 24.14 KG/M2 | HEIGHT: 58 IN | HEART RATE: 81 BPM | WEIGHT: 115 LBS | SYSTOLIC BLOOD PRESSURE: 117 MMHG | OXYGEN SATURATION: 96 % | DIASTOLIC BLOOD PRESSURE: 70 MMHG

## 2019-03-08 DIAGNOSIS — R06.00 DYSPNEA, UNSPECIFIED: ICD-10-CM

## 2019-03-08 DIAGNOSIS — R06.09 OTHER FORMS OF DYSPNEA: ICD-10-CM

## 2019-03-08 PROCEDURE — 93000 ELECTROCARDIOGRAM COMPLETE: CPT

## 2019-03-08 PROCEDURE — 99204 OFFICE O/P NEW MOD 45 MIN: CPT

## 2019-03-08 RX ORDER — FLUTICASONE PROPIONATE 50 UG/1
50 SPRAY, METERED NASAL
Qty: 16 | Refills: 0 | Status: DISCONTINUED | COMMUNITY
Start: 2018-10-22

## 2019-03-08 RX ORDER — METOPROLOL SUCCINATE 25 MG/1
25 TABLET, EXTENDED RELEASE ORAL
Qty: 30 | Refills: 0 | Status: DISCONTINUED | COMMUNITY
Start: 2019-02-24 | End: 2019-03-08

## 2019-03-08 RX ORDER — FUROSEMIDE 20 MG/1
20 TABLET ORAL
Qty: 30 | Refills: 0 | Status: DISCONTINUED | COMMUNITY
Start: 2018-10-21

## 2019-03-08 RX ORDER — INSULIN GLULISINE 100 [IU]/ML
100 INJECTION, SOLUTION SUBCUTANEOUS
Qty: 30 | Refills: 0 | Status: DISCONTINUED | COMMUNITY
Start: 2018-10-18

## 2019-03-08 RX ORDER — LEVOTHYROXINE SODIUM 0.05 MG/1
50 TABLET ORAL
Qty: 30 | Refills: 0 | Status: COMPLETED | COMMUNITY
Start: 2018-10-21

## 2019-03-08 RX ORDER — ASPIRIN ENTERIC COATED TABLETS 81 MG 81 MG/1
81 TABLET, DELAYED RELEASE ORAL
Qty: 30 | Refills: 0 | Status: DISCONTINUED | COMMUNITY
Start: 2018-10-21

## 2019-03-08 NOTE — PHYSICAL EXAM
[General Appearance - Well Developed] : well developed [Normal Appearance] : normal appearance [Well Groomed] : well groomed [General Appearance - Well Nourished] : well nourished [No Deformities] : no deformities [General Appearance - In No Acute Distress] : no acute distress [Normal Conjunctiva] : the conjunctiva exhibited no abnormalities [Eyelids - No Xanthelasma] : the eyelids demonstrated no xanthelasmas [Normal Oral Mucosa] : normal oral mucosa [No Oral Pallor] : no oral pallor [No Oral Cyanosis] : no oral cyanosis [Normal Jugular Venous A Waves Present] : normal jugular venous A waves present [Normal Jugular Venous V Waves Present] : normal jugular venous V waves present [No Jugular Venous Walker A Waves] : no jugular venous walker A waves [Heart Rate And Rhythm] : heart rate and rhythm were normal [Heart Sounds] : normal S1 and S2 [Murmurs] : no murmurs present [Respiration, Rhythm And Depth] : normal respiratory rhythm and effort [Exaggerated Use Of Accessory Muscles For Inspiration] : no accessory muscle use [Abdomen Soft] : soft [Abdomen Tenderness] : non-tender [Abdomen Mass (___ Cm)] : no abdominal mass palpated [Abnormal Walk] : normal gait [Gait - Sufficient For Exercise Testing] : the gait was sufficient for exercise testing [Nail Clubbing] : no clubbing of the fingernails [Cyanosis, Localized] : no localized cyanosis [Petechial Hemorrhages (___cm)] : no petechial hemorrhages [Skin Color & Pigmentation] : normal skin color and pigmentation [] : no rash [No Venous Stasis] : no venous stasis [Skin Lesions] : no skin lesions [No Skin Ulcers] : no skin ulcer [No Xanthoma] : no  xanthoma was observed [Oriented To Time, Place, And Person] : oriented to person, place, and time [Affect] : the affect was normal [Mood] : the mood was normal [No Anxiety] : not feeling anxious [FreeTextEntry1] : B/L crackles

## 2019-03-08 NOTE — DISCUSSION/SUMMARY
[FreeTextEntry1] : In summary, Nuzhat Gamino is a 72y/o woman with Hx of HTN, HLD, DMII, hyopthyroidism, GERD, CAD s/p CABG and PCI (most recent 11/2018 to CORNELIA), and chronic systolic CHF, LVEF 30-35%, who presents today for initial evaluation for possible ICD placement. Accompanied by her daughter. States patient is having increased dyspnea on exertion, unable to walk more than 3-4 steps before stopping to rest. Patient appears SOB at rest. Unable to tolerate lying flat. Has been taking diuretics daily and monitors daily weight with no significant weight gain as per daughter. Denies chest pain, palpitations, syncope or near syncope. EKG today NSR. On review of medications, not currently on OMT (no ACE-I or ARB). Given class III/IV heart failure symptoms at present, recommend increased diuretic therapy and follow up with Cardiologist next week. Once optimally medically management, would recommend placement of ICD for primary prevention of SCD. A thorough discussion was had with the patient concerning all aspects of ICD therapy. We reviewed the data supporting ICD therapy and how it applies individually.  We discussed the procedures, risks and outcomes of ICD implantation an living with an ICD. We discussed management of ICD therapy throughout life, including deactivation of the ICD. After all questions were answered, and literature from the [facility] was provided, it was a shared decision to proceed with ICD therapy when applicable.\par \par Sincerely,\par \par Miguel A Uriarte MD\par \par

## 2019-03-08 NOTE — HISTORY OF PRESENT ILLNESS
[FreeTextEntry1] : Luis Cotto MD\par \par I saw Nuzhat Gamino on March 8, 2019. As you know, she is a 74y/o woman with Hx of HTN, HLD, DMII, hyopthyroidism, GERD, CAD s/p CABG and PCI (most recent 11/2018 to CORNELIA), and chronic systolic CHF, LVEF 30-35%, who presents today for initial evaluation for possible ICD placement. Accompanied by her daughter. States patient is having increased dyspnea on exertion, unable to walk more than 3-4 steps before stopping to rest. Patient appears SOB at rest. Unable to tolerate lying flat. Has been taking diuretics daily and monitors daily weight with no significant weight gain as per daughter. Denies chest pain, palpitations, syncope or near syncope.

## 2019-03-08 NOTE — REVIEW OF SYSTEMS
[Feeling Fatigued] : feeling fatigued [Shortness Of Breath] : shortness of breath [Dyspnea on exertion] : dyspnea during exertion [Negative] : Heme/Lymph

## 2019-03-11 NOTE — DISCHARGE NOTE ADULT - NSCORESITESY/N_GEN_A_CORE_RD
Date: 03/11/19


Patient seen and examined, improving clinically, continue current management as 

outlined. No

## 2019-03-26 ENCOUNTER — RX RENEWAL (OUTPATIENT)
Age: 74
End: 2019-03-26

## 2019-04-23 ENCOUNTER — INPATIENT (INPATIENT)
Facility: HOSPITAL | Age: 74
LOS: 22 days | Discharge: NURSING, REHAB OR LONG-TERM CARE | End: 2019-05-16
Attending: INTERNAL MEDICINE | Admitting: INTERNAL MEDICINE
Payer: MEDICARE

## 2019-04-23 VITALS
RESPIRATION RATE: 20 BRPM | TEMPERATURE: 98 F | SYSTOLIC BLOOD PRESSURE: 104 MMHG | HEART RATE: 73 BPM | DIASTOLIC BLOOD PRESSURE: 31 MMHG | OXYGEN SATURATION: 96 %

## 2019-04-23 DIAGNOSIS — Z95.1 PRESENCE OF AORTOCORONARY BYPASS GRAFT: Chronic | ICD-10-CM

## 2019-04-23 LAB
APTT BLD: 47.4 SEC — HIGH (ref 27.5–36.3)
BASOPHILS # BLD AUTO: 0.04 K/UL — SIGNIFICANT CHANGE UP (ref 0–0.2)
BASOPHILS NFR BLD AUTO: 0.5 % — SIGNIFICANT CHANGE UP (ref 0–2)
EOSINOPHIL # BLD AUTO: 0.38 K/UL — SIGNIFICANT CHANGE UP (ref 0–0.5)
EOSINOPHIL NFR BLD AUTO: 4.3 % — SIGNIFICANT CHANGE UP (ref 0–6)
HCT VFR BLD CALC: 33.6 % — LOW (ref 34.5–45)
HGB BLD-MCNC: 10.2 G/DL — LOW (ref 11.5–15.5)
IMM GRANULOCYTES NFR BLD AUTO: 1.1 % — SIGNIFICANT CHANGE UP (ref 0–1.5)
INR BLD: 1.06 — SIGNIFICANT CHANGE UP (ref 0.88–1.17)
LYMPHOCYTES # BLD AUTO: 2.02 K/UL — SIGNIFICANT CHANGE UP (ref 1–3.3)
LYMPHOCYTES # BLD AUTO: 23 % — SIGNIFICANT CHANGE UP (ref 13–44)
MCHC RBC-ENTMCNC: 29.1 PG — SIGNIFICANT CHANGE UP (ref 27–34)
MCHC RBC-ENTMCNC: 30.4 % — LOW (ref 32–36)
MCV RBC AUTO: 95.7 FL — SIGNIFICANT CHANGE UP (ref 80–100)
MONOCYTES # BLD AUTO: 0.65 K/UL — SIGNIFICANT CHANGE UP (ref 0–0.9)
MONOCYTES NFR BLD AUTO: 7.4 % — SIGNIFICANT CHANGE UP (ref 2–14)
NEUTROPHILS # BLD AUTO: 5.58 K/UL — SIGNIFICANT CHANGE UP (ref 1.8–7.4)
NEUTROPHILS NFR BLD AUTO: 63.7 % — SIGNIFICANT CHANGE UP (ref 43–77)
NRBC # FLD: 0 K/UL — SIGNIFICANT CHANGE UP (ref 0–0)
PLATELET # BLD AUTO: 285 K/UL — SIGNIFICANT CHANGE UP (ref 150–400)
PMV BLD: 9.4 FL — SIGNIFICANT CHANGE UP (ref 7–13)
PROTHROM AB SERPL-ACNC: 12.1 SEC — SIGNIFICANT CHANGE UP (ref 9.8–13.1)
RBC # BLD: 3.51 M/UL — LOW (ref 3.8–5.2)
RBC # FLD: 16.7 % — HIGH (ref 10.3–14.5)
WBC # BLD: 8.77 K/UL — SIGNIFICANT CHANGE UP (ref 3.8–10.5)
WBC # FLD AUTO: 8.77 K/UL — SIGNIFICANT CHANGE UP (ref 3.8–10.5)

## 2019-04-23 RX ORDER — ASPIRIN/CALCIUM CARB/MAGNESIUM 324 MG
324 TABLET ORAL ONCE
Qty: 0 | Refills: 0 | Status: COMPLETED | OUTPATIENT
Start: 2019-04-23 | End: 2019-04-23

## 2019-04-23 RX ADMIN — Medication 324 MILLIGRAM(S): at 23:56

## 2019-04-23 NOTE — ED ADULT TRIAGE NOTE - CHIEF COMPLAINT QUOTE
Pt c/o chest pain & SOB for 2 days, indicates is midsternal.  Denies abdominal pain, n/v. No fevers/chills. PMHx CAD, CABG x 3 stents on Brilinta. EKG obtained.

## 2019-04-23 NOTE — ED PROVIDER NOTE - CLINICAL SUMMARY MEDICAL DECISION MAKING FREE TEXT BOX
73F with ZEE and CP, likely acute on chronic chf exacerbation, r/o scx. check labs with ekg and cxr, asa

## 2019-04-23 NOTE — ED PROVIDER NOTE - ATTENDING CONTRIBUTION TO CARE
74 y/o F with h/o CAD s/p CABG, hypothyroidism, CKD, CHF (EF 20%), HTN, DM p/w SOB.  Hx obtained from daughter per pt request.  At baseline, pt with occasional ZEE and chest pain, which has worsened over the past 2 days.  Now with SOB at rest, worsening ZEE and orthopnea.  Also with intermittent episodes of substernal chest tightness.  No fever, cough, uri sxs, abd pain, n/v.  Daughter notes increased swelling to bilateral LE.  No recent travel, sick contacts, or hospitalizations. Elderly female, sitting in stretcher, awake and alert, nontoxic.  Afeb, tachypneic, VSS.  Tachypneic, but no hypoxia or accessory muscle use.  Lungs cta bl with bibasilar rales.  Cards nl S1/S2, RRR, no MRG.  Abd soft ntnd.  Trace bilateral pitting pedal edema extending towards knees, no unilateral swelling or calf tenderness.  Plan for ekg, labs, cxr, admit tele.  Suspect chf exacerbation vs acs equivalent, will plan to diurese, reassess.

## 2019-04-23 NOTE — ED PROVIDER NOTE - OBJECTIVE STATEMENT
73F with hx of CAD s/p CABG, hypothyroidism, CKD, CHF, HTN, DM p/w worsening ZEE and sob. patient usually can walk up a few steps before feeling sob, currently feeling sob after 1 step and using 3 pillows to sleep. No cough, fevers. (+) CP, SS and constant, worsening over the past 2 days.

## 2019-04-23 NOTE — ED ADULT NURSE NOTE - OBJECTIVE STATEMENT
73 y female A+OX3, primarily English speaking with daughter translating at bedside, presents to the ER with the complaint of mid-sternal CP and sob for the past 2 days. As per daughter, pt has complaining of mid-sternal cp along with neck pain and states some swelling of her feet. Daughter states pt has hx of CHF, CABG and 2 stents placed and currently on brillinta. Pt appears tachypneic but Sating at %. Pt placed on 2LPM NC for comfort.  Pt normally ambulatory with a cane or assistance but for the past few days has been too weak to walk. Pt denies any fevers or chillls but endorses an intermittent cough here and there. Denies any urinary symptoms or any diarrhea/constipation. Endorses some nausea. 20 g iv placed on right ac, labs drawn and sent. Pt placed on cardiac monitor. Will continue to monitor.

## 2019-04-24 DIAGNOSIS — E11.9 TYPE 2 DIABETES MELLITUS WITHOUT COMPLICATIONS: ICD-10-CM

## 2019-04-24 DIAGNOSIS — K21.9 GASTRO-ESOPHAGEAL REFLUX DISEASE WITHOUT ESOPHAGITIS: ICD-10-CM

## 2019-04-24 DIAGNOSIS — Z29.9 ENCOUNTER FOR PROPHYLACTIC MEASURES, UNSPECIFIED: ICD-10-CM

## 2019-04-24 DIAGNOSIS — I25.10 ATHEROSCLEROTIC HEART DISEASE OF NATIVE CORONARY ARTERY WITHOUT ANGINA PECTORIS: ICD-10-CM

## 2019-04-24 DIAGNOSIS — N18.9 CHRONIC KIDNEY DISEASE, UNSPECIFIED: ICD-10-CM

## 2019-04-24 DIAGNOSIS — I50.9 HEART FAILURE, UNSPECIFIED: ICD-10-CM

## 2019-04-24 DIAGNOSIS — E78.5 HYPERLIPIDEMIA, UNSPECIFIED: ICD-10-CM

## 2019-04-24 DIAGNOSIS — R07.9 CHEST PAIN, UNSPECIFIED: ICD-10-CM

## 2019-04-24 DIAGNOSIS — E03.9 HYPOTHYROIDISM, UNSPECIFIED: ICD-10-CM

## 2019-04-24 LAB
ALBUMIN SERPL ELPH-MCNC: 4.3 G/DL — SIGNIFICANT CHANGE UP (ref 3.3–5)
ALP SERPL-CCNC: 73 U/L — SIGNIFICANT CHANGE UP (ref 40–120)
ALT FLD-CCNC: 10 U/L — SIGNIFICANT CHANGE UP (ref 4–33)
ANION GAP SERPL CALC-SCNC: 18 MMO/L — HIGH (ref 7–14)
AST SERPL-CCNC: 16 U/L — SIGNIFICANT CHANGE UP (ref 4–32)
BASE EXCESS BLDV CALC-SCNC: -1.2 MMOL/L — SIGNIFICANT CHANGE UP
BILIRUB SERPL-MCNC: 0.4 MG/DL — SIGNIFICANT CHANGE UP (ref 0.2–1.2)
BUN SERPL-MCNC: 58 MG/DL — HIGH (ref 7–23)
CALCIUM SERPL-MCNC: 9.8 MG/DL — SIGNIFICANT CHANGE UP (ref 8.4–10.5)
CHLORIDE SERPL-SCNC: 104 MMOL/L — SIGNIFICANT CHANGE UP (ref 98–107)
CHOLEST SERPL-MCNC: 127 MG/DL — SIGNIFICANT CHANGE UP (ref 120–199)
CO2 SERPL-SCNC: 20 MMOL/L — LOW (ref 22–31)
CREAT SERPL-MCNC: 2.21 MG/DL — HIGH (ref 0.5–1.3)
D DIMER BLD IA.RAPID-MCNC: 385 NG/ML — SIGNIFICANT CHANGE UP
GAS PNL BLDV: 142 MMOL/L — SIGNIFICANT CHANGE UP (ref 136–146)
GLUCOSE BLDC GLUCOMTR-MCNC: 108 MG/DL — HIGH (ref 70–99)
GLUCOSE BLDC GLUCOMTR-MCNC: 212 MG/DL — HIGH (ref 70–99)
GLUCOSE BLDV-MCNC: 327 — HIGH (ref 70–99)
GLUCOSE SERPL-MCNC: 185 MG/DL — HIGH (ref 70–99)
HBA1C BLD-MCNC: 7.2 % — HIGH (ref 4–5.6)
HCO3 BLDV-SCNC: 22 MMOL/L — SIGNIFICANT CHANGE UP (ref 20–27)
HCT VFR BLDV CALC: 31 % — LOW (ref 34.5–45)
HDLC SERPL-MCNC: 22 MG/DL — LOW (ref 45–65)
HGB BLDV-MCNC: 10 G/DL — LOW (ref 11.5–15.5)
LIPID PNL WITH DIRECT LDL SERPL: 67 MG/DL — SIGNIFICANT CHANGE UP
NT-PROBNP SERPL-SCNC: 1779 PG/ML — SIGNIFICANT CHANGE UP
NT-PROBNP SERPL-SCNC: 2552 PG/ML — SIGNIFICANT CHANGE UP
PCO2 BLDV: 40 MMHG — LOW (ref 41–51)
PH BLDV: 7.38 PH — SIGNIFICANT CHANGE UP (ref 7.32–7.43)
PO2 BLDV: 26 MMHG — LOW (ref 35–40)
POTASSIUM BLDV-SCNC: 3.4 MMOL/L — SIGNIFICANT CHANGE UP (ref 3.4–4.5)
POTASSIUM SERPL-MCNC: 3.5 MMOL/L — SIGNIFICANT CHANGE UP (ref 3.5–5.3)
POTASSIUM SERPL-SCNC: 3.5 MMOL/L — SIGNIFICANT CHANGE UP (ref 3.5–5.3)
PROT SERPL-MCNC: 8 G/DL — SIGNIFICANT CHANGE UP (ref 6–8.3)
SAO2 % BLDV: 35.1 % — LOW (ref 60–85)
SODIUM SERPL-SCNC: 142 MMOL/L — SIGNIFICANT CHANGE UP (ref 135–145)
TRIGL SERPL-MCNC: 258 MG/DL — HIGH (ref 10–149)
TROPONIN T, HIGH SENSITIVITY: 63 NG/L — CRITICAL HIGH (ref ?–14)
TROPONIN T, HIGH SENSITIVITY: 65 NG/L — CRITICAL HIGH (ref ?–14)

## 2019-04-24 PROCEDURE — 71045 X-RAY EXAM CHEST 1 VIEW: CPT | Mod: 26

## 2019-04-24 RX ORDER — METOPROLOL TARTRATE 50 MG
25 TABLET ORAL
Qty: 0 | Refills: 0 | Status: DISCONTINUED | OUTPATIENT
Start: 2019-04-24 | End: 2019-04-25

## 2019-04-24 RX ORDER — DEXTROSE 50 % IN WATER 50 %
15 SYRINGE (ML) INTRAVENOUS ONCE
Qty: 0 | Refills: 0 | Status: DISCONTINUED | OUTPATIENT
Start: 2019-04-24 | End: 2019-05-16

## 2019-04-24 RX ORDER — SODIUM CHLORIDE 9 MG/ML
1000 INJECTION, SOLUTION INTRAVENOUS
Qty: 0 | Refills: 0 | Status: DISCONTINUED | OUTPATIENT
Start: 2019-04-24 | End: 2019-05-16

## 2019-04-24 RX ORDER — FUROSEMIDE 40 MG
40 TABLET ORAL ONCE
Qty: 0 | Refills: 0 | Status: COMPLETED | OUTPATIENT
Start: 2019-04-24 | End: 2019-04-24

## 2019-04-24 RX ORDER — ATORVASTATIN CALCIUM 80 MG/1
40 TABLET, FILM COATED ORAL AT BEDTIME
Qty: 0 | Refills: 0 | Status: DISCONTINUED | OUTPATIENT
Start: 2019-04-24 | End: 2019-05-16

## 2019-04-24 RX ORDER — INFLUENZA VIRUS VACCINE 15; 15; 15; 15 UG/.5ML; UG/.5ML; UG/.5ML; UG/.5ML
0.5 SUSPENSION INTRAMUSCULAR ONCE
Qty: 0 | Refills: 0 | Status: DISCONTINUED | OUTPATIENT
Start: 2019-04-24 | End: 2019-05-16

## 2019-04-24 RX ORDER — DEXTROSE 50 % IN WATER 50 %
12.5 SYRINGE (ML) INTRAVENOUS ONCE
Qty: 0 | Refills: 0 | Status: DISCONTINUED | OUTPATIENT
Start: 2019-04-24 | End: 2019-05-16

## 2019-04-24 RX ORDER — GLUCAGON INJECTION, SOLUTION 0.5 MG/.1ML
1 INJECTION, SOLUTION SUBCUTANEOUS ONCE
Qty: 0 | Refills: 0 | Status: DISCONTINUED | OUTPATIENT
Start: 2019-04-24 | End: 2019-05-16

## 2019-04-24 RX ORDER — HEPARIN SODIUM 5000 [USP'U]/ML
5000 INJECTION INTRAVENOUS; SUBCUTANEOUS EVERY 8 HOURS
Qty: 0 | Refills: 0 | Status: DISCONTINUED | OUTPATIENT
Start: 2019-04-24 | End: 2019-04-25

## 2019-04-24 RX ORDER — INSULIN GLARGINE 100 [IU]/ML
65 INJECTION, SOLUTION SUBCUTANEOUS AT BEDTIME
Qty: 0 | Refills: 0 | Status: DISCONTINUED | OUTPATIENT
Start: 2019-04-24 | End: 2019-05-10

## 2019-04-24 RX ORDER — ASPIRIN/CALCIUM CARB/MAGNESIUM 324 MG
81 TABLET ORAL DAILY
Qty: 0 | Refills: 0 | Status: DISCONTINUED | OUTPATIENT
Start: 2019-04-24 | End: 2019-04-25

## 2019-04-24 RX ORDER — DEXTROSE 50 % IN WATER 50 %
25 SYRINGE (ML) INTRAVENOUS ONCE
Qty: 0 | Refills: 0 | Status: DISCONTINUED | OUTPATIENT
Start: 2019-04-24 | End: 2019-05-16

## 2019-04-24 RX ORDER — INSULIN LISPRO 100/ML
30 VIAL (ML) SUBCUTANEOUS
Qty: 0 | Refills: 0 | Status: DISCONTINUED | OUTPATIENT
Start: 2019-04-24 | End: 2019-05-09

## 2019-04-24 RX ORDER — FUROSEMIDE 40 MG
40 TABLET ORAL EVERY 12 HOURS
Qty: 0 | Refills: 0 | Status: DISCONTINUED | OUTPATIENT
Start: 2019-04-24 | End: 2019-04-25

## 2019-04-24 RX ORDER — ACETAMINOPHEN 500 MG
650 TABLET ORAL ONCE
Qty: 0 | Refills: 0 | Status: COMPLETED | OUTPATIENT
Start: 2019-04-24 | End: 2019-04-24

## 2019-04-24 RX ORDER — INSULIN LISPRO 100/ML
VIAL (ML) SUBCUTANEOUS AT BEDTIME
Qty: 0 | Refills: 0 | Status: DISCONTINUED | OUTPATIENT
Start: 2019-04-24 | End: 2019-05-16

## 2019-04-24 RX ORDER — TICAGRELOR 90 MG/1
90 TABLET ORAL
Qty: 0 | Refills: 0 | Status: DISCONTINUED | OUTPATIENT
Start: 2019-04-24 | End: 2019-04-25

## 2019-04-24 RX ORDER — INSULIN LISPRO 100/ML
VIAL (ML) SUBCUTANEOUS
Qty: 0 | Refills: 0 | Status: DISCONTINUED | OUTPATIENT
Start: 2019-04-24 | End: 2019-05-16

## 2019-04-24 RX ADMIN — Medication 25 MILLIGRAM(S): at 17:29

## 2019-04-24 RX ADMIN — ATORVASTATIN CALCIUM 40 MILLIGRAM(S): 80 TABLET, FILM COATED ORAL at 22:06

## 2019-04-24 RX ADMIN — HEPARIN SODIUM 5000 UNIT(S): 5000 INJECTION INTRAVENOUS; SUBCUTANEOUS at 15:14

## 2019-04-24 RX ADMIN — Medication 30 UNIT(S): at 15:15

## 2019-04-24 RX ADMIN — Medication 4: at 15:14

## 2019-04-24 RX ADMIN — Medication 650 MILLIGRAM(S): at 22:34

## 2019-04-24 RX ADMIN — Medication 30 UNIT(S): at 18:13

## 2019-04-24 RX ADMIN — Medication 2: at 18:13

## 2019-04-24 RX ADMIN — Medication 81 MILLIGRAM(S): at 17:29

## 2019-04-24 RX ADMIN — Medication 40 MILLIGRAM(S): at 00:53

## 2019-04-24 RX ADMIN — HEPARIN SODIUM 5000 UNIT(S): 5000 INJECTION INTRAVENOUS; SUBCUTANEOUS at 22:06

## 2019-04-24 RX ADMIN — TICAGRELOR 90 MILLIGRAM(S): 90 TABLET ORAL at 17:29

## 2019-04-24 RX ADMIN — Medication 40 MILLIGRAM(S): at 17:29

## 2019-04-24 RX ADMIN — Medication 650 MILLIGRAM(S): at 23:04

## 2019-04-24 NOTE — H&P ADULT - HISTORY OF PRESENT ILLNESS
73YOF with hx of CAD s/p CABG, hypothyroidism, CKD, CHF, HTN, DM p/w worsening ZEE and sob. Patient usually can walk up a few steps before feeling sob, currently feeling sob after walking from living room to kitchen.  She is using 3 pillows to sleep. No cough, fevers. (+) CP, SS and constant, She experienced more ZEE for last two days.  She has no edema in lower extremety.  Last admission to [facility] was 11/18.

## 2019-04-24 NOTE — H&P ADULT - NSHPLABSRESULTS_GEN_ALL_CORE
10.2   8.77  )-----------( 285      ( 23 Apr 2019 23:20 )             33.6   04-23    142  |  104  |  58<H>  ----------------------------<  185<H>  3.5   |  20<L>  |  2.21<H>    Ca    9.8      23 Apr 2019 23:32    TPro  8.0  /  Alb  4.3  /  TBili  0.4  /  DBili  x   /  AST  16  /  ALT  10  /  AlkPhos  73  04-23    < from: Xray Chest 1 View- PORTABLE-Urgent (04.24.19 @ 00:26) >    Diffusely prominent and indistinct vascular and interstitial markings and   hazy hilar shadows compatible with congestion and edema.     No focal patchy airspace consolidations, pleural effusions, or   pneumothorax.    Sternal wires, mediastinal clips, retained epicardial pacing wire leads,   and left coronary stents again noted. Stable prominent appearing cardiac   and mediastinal silhouettes.    Trachea midline.    Slightly osteopenic appearing but otherwise grossly unremarkable osseous   structures.        < end of copied text >    < from: TTE with Doppler (w/Cont) (09.06.18 @ 15:21) >    Mitral annular calcification, otherwise normal mitral  valve. Mild mitral regurgitation.  2. Normal left ventricular internal dimensions and wall  thicknesses.  3. Endocardium not well visualized; grossly moderate to  severe global left ventricular systolic dysfunction.  The  inferolateral wall appears particularly hypokinetic.  Endocardial visualization enhanced with intravenous  injection of echo contrast (Definity).  4. The right ventricle is not well visualized; grossly  normal right ventricular systolic function.

## 2019-04-24 NOTE — CONSULT NOTE ADULT - ASSESSMENT
73YOF with hx of CAD s/p CABG, hypothyroidism, CKD, CHF, HTN, DM p/w worsening ZEE and sob.    A/p  MERVIN  scr on admission 2.21  possible renal vasal congestion vs progression of disease  diuresing per cardio  monitor bmp  monitor daily weight and i/o  avoid nephrotoxic agents    CKD stage 3  baseline cr 1.5-1.8  likely sec to HTN/DM/chf  check PTH, phos level    CHF  f/u cardio 73YOF with hx of CAD s/p CABG, hypothyroidism, CKD, CHF, HTN, DM p/w worsening ZEE and sob.    A/p  MERVIN  scr on admission 2.21  possible renal vasal congestion vs progression of CKD  diuresing per cardio  monitor bmp  monitor daily weight and i/o  avoid nephrotoxic agents    CKD stage 3  baseline cr 1.5-1.8  likely sec to HTN/DM/chf  check PTH, phos level    CHF  f/u cardio

## 2019-04-24 NOTE — H&P ADULT - NSICDXPASTMEDICALHX_GEN_ALL_CORE_FT
PAST MEDICAL HISTORY:  CAD (coronary artery disease) s/p CABG    Diabetes mellitus, type 2     GERD (gastroesophageal reflux disease)     Hyperlipidemia     Hypothyroidism

## 2019-04-24 NOTE — CONSULT NOTE ADULT - SUBJECTIVE AND OBJECTIVE BOX
[facility]   MD RAHEEL SHAH MD ANGELA WONG, PA    TEL:  OFFICE: [phone]  DR SANTOYO CELL: [phone]  DR. NGUYEN CELL: [phone]  UMM KENDALL CELL: [phone]      HPI:  73YOF with hx of CAD s/p CABG, hypothyroidism, CKD, CHF, HTN, DM p/w worsening ZEE and sob. Patient usually can walk up a few steps before feeling sob, currently feeling sob after walking from living room to kitchen.  She is using 3 pillows to sleep. No cough, fevers. (+) CP, SS and constant, She experienced more ZEE for last two days.     Allergies:  No Known Allergies      PAST MEDICAL & SURGICAL HISTORY:  GERD (gastroesophageal reflux disease)  CAD (coronary artery disease): s/p CABG  Hypothyroidism  Hyperlipidemia  Diabetes mellitus, type 2  S/P CABG (coronary artery bypass graft)      Home Medications Reviewed    Hospital Medications:   MEDICATIONS  (STANDING):  aspirin enteric coated 81 milliGRAM(s) Oral daily  atorvastatin 40 milliGRAM(s) Oral at bedtime  dextrose 5%. 1000 milliLiter(s) (50 mL/Hr) IV Continuous <Continuous>  dextrose 50% Injectable 12.5 Gram(s) IV Push once  dextrose 50% Injectable 25 Gram(s) IV Push once  dextrose 50% Injectable 25 Gram(s) IV Push once  furosemide    Tablet 40 milliGRAM(s) Oral every 12 hours  heparin  Injectable 5000 Unit(s) SubCutaneous every 8 hours  influenza   Vaccine 0.5 milliLiter(s) IntraMuscular once  insulin glargine Injectable (LANTUS) 65 Unit(s) SubCutaneous at bedtime  insulin lispro (HumaLOG) corrective regimen sliding scale   SubCutaneous three times a day before meals  insulin lispro (HumaLOG) corrective regimen sliding scale   SubCutaneous at bedtime  insulin lispro Injectable (HumaLOG) 30 Unit(s) SubCutaneous three times a day before meals  metoprolol tartrate 25 milliGRAM(s) Oral two times a day  ticagrelor 90 milliGRAM(s) Oral two times a day      SOCIAL HISTORY:  Denies ETOh, Smoking,     FAMILY HISTORY:  Family history of hypertension (Sibling): sister  Family history of diabetes mellitus (Sibling): brothers/sisters      REVIEW OF SYSTEMS:  CONSTITUTIONAL: No weakness, fevers or chills  EYES/ENT: No visual changes;  No vertigo or throat pain   NECK: No pain or stiffness  RESPIRATORY: see HPI  CARDIOVASCULAR: No chest pain or palpitations.  GASTROINTESTINAL: No abdominal or epigastric pain. No nausea, vomiting, or hematemesis; No diarrhea or constipation. No melena or hematochezia.  GENITOURINARY: No dysuria, frequency, foamy urine, urinary urgency, incontinence or hematuria  NEUROLOGICAL: No numbness or weakness  SKIN: No itching, burning, rashes, or lesions   VASCULAR: No bilateral lower extremity edema.   All other review of systems is negative unless indicated above.    VITALS:  T(F): 98 (04-24-19 @ 16:08), Max: 98 (04-23-19 @ 22:30)  HR: 77 (04-24-19 @ 16:08)  BP: 113/43 (04-24-19 @ 16:08)  RR: 17 (04-24-19 @ 16:08)  SpO2: 100% (04-24-19 @ 16:08)  Wt(kg): --    Height (cm): 137.16 (04-24 @ 10:21)  Weight (kg): 57 (04-24 @ 10:21)  BMI (kg/m2): 30.3 (04-24 @ 10:21)  BSA (m2): 1.42 (04-24 @ 10:21)    PHYSICAL EXAM:  Constitutional: NAD  HEENT: anicteric sclera, oropharynx clear, MMM  Neck: No JVD  Respiratory: b/l basilar crackles  Cardiovascular: S1, S2, RRR  Gastrointestinal: BS+, soft, NT/ND  Extremities: trace peripheral edema  Neurological: A/O x 3, no focal deficits  Psychiatric: Normal mood, normal affect  : No CVA tenderness. No cuellar.   Skin: No rashes      LABS:  04-23    142  |  104  |  58<H>  ----------------------------<  185<H>  3.5   |  20<L>  |  2.21<H>    Ca    9.8      23 Apr 2019 23:32    TPro  8.0  /  Alb  4.3  /  TBili  0.4  /  DBili      /  AST  16  /  ALT  10  /  AlkPhos  73  04-23    Creatinine Trend: 2.21 <--                        10.2   8.77  )-----------( 285      ( 23 Apr 2019 23:20 )             33.6     Urine Studies:        RADIOLOGY & ADDITIONAL STUDIES:

## 2019-04-24 NOTE — H&P ADULT - NSICDXFAMILYHX_GEN_ALL_CORE_FT
FAMILY HISTORY:  Sibling  Still living? Yes, Estimated age: Age Unknown  Family history of diabetes mellitus, Age at diagnosis: Age Unknown  Family history of hypertension, Age at diagnosis: Age Unknown

## 2019-04-24 NOTE — H&P ADULT - ATTENDING COMMENTS
Patient seen and examined.  Agree with above.   -Admitted with dyspnea/heart failure  -IV diuresis to keep O > I  -check CPK  -check TTE    Corie Ga MD

## 2019-04-24 NOTE — H&P ADULT - NSHPSOCIALHISTORY_GEN_ALL_CORE
,   in [facility], She lives with daughter and son-in-law  Does not smoke nor drink

## 2019-04-24 NOTE — H&P ADULT - ASSESSMENT
73YOF w/o h/o CAD, s/p CABG CHF adm to [facility] for Acute on Chronic systolic and diastolic HF

## 2019-04-24 NOTE — PATIENT PROFILE ADULT - NSPROHMDIABETMGMTSTRAT_GEN_A_NUR
routine screenings/medication therapy/blood glucose testing/diet modification/insulin therapy/weight management/exercise

## 2019-04-25 LAB
ANION GAP SERPL CALC-SCNC: 16 MMO/L — HIGH (ref 7–14)
APPEARANCE UR: CLEAR — SIGNIFICANT CHANGE UP
APTT BLD: > 200 SEC — CRITICAL HIGH (ref 27.5–36.3)
B PERT DNA SPEC QL NAA+PROBE: NOT DETECTED — SIGNIFICANT CHANGE UP
BASE EXCESS BLDA CALC-SCNC: -2.7 MMOL/L — SIGNIFICANT CHANGE UP
BILIRUB UR-MCNC: NEGATIVE — SIGNIFICANT CHANGE UP
BLOOD UR QL VISUAL: NEGATIVE — SIGNIFICANT CHANGE UP
BUN SERPL-MCNC: 52 MG/DL — HIGH (ref 7–23)
C PNEUM DNA SPEC QL NAA+PROBE: NOT DETECTED — SIGNIFICANT CHANGE UP
CALCIUM SERPL-MCNC: 9.6 MG/DL — SIGNIFICANT CHANGE UP (ref 8.4–10.5)
CHLORIDE SERPL-SCNC: 106 MMOL/L — SIGNIFICANT CHANGE UP (ref 98–107)
CK MB BLD-MCNC: 2.86 NG/ML — SIGNIFICANT CHANGE UP (ref 1–4.7)
CK MB BLD-MCNC: 2.93 NG/ML — SIGNIFICANT CHANGE UP (ref 1–4.7)
CK MB BLD-MCNC: 2.98 NG/ML — SIGNIFICANT CHANGE UP (ref 1–4.7)
CK MB BLD-MCNC: 7.66 NG/ML — HIGH (ref 1–4.7)
CK MB BLD-MCNC: SIGNIFICANT CHANGE UP (ref 0–2.5)
CK SERPL-CCNC: 71 U/L — SIGNIFICANT CHANGE UP (ref 25–170)
CK SERPL-CCNC: 73 U/L — SIGNIFICANT CHANGE UP (ref 25–170)
CK SERPL-CCNC: 75 U/L — SIGNIFICANT CHANGE UP (ref 25–170)
CK SERPL-CCNC: 96 U/L — SIGNIFICANT CHANGE UP (ref 25–170)
CO2 SERPL-SCNC: 21 MMOL/L — LOW (ref 22–31)
COLOR SPEC: SIGNIFICANT CHANGE UP
CREAT SERPL-MCNC: 1.91 MG/DL — HIGH (ref 0.5–1.3)
FLUAV H1 2009 PAND RNA SPEC QL NAA+PROBE: NOT DETECTED — SIGNIFICANT CHANGE UP
FLUAV H1 RNA SPEC QL NAA+PROBE: NOT DETECTED — SIGNIFICANT CHANGE UP
FLUAV H3 RNA SPEC QL NAA+PROBE: NOT DETECTED — SIGNIFICANT CHANGE UP
FLUAV SUBTYP SPEC NAA+PROBE: NOT DETECTED — SIGNIFICANT CHANGE UP
FLUBV RNA SPEC QL NAA+PROBE: NOT DETECTED — SIGNIFICANT CHANGE UP
GLUCOSE BLDC GLUCOMTR-MCNC: 181 MG/DL — HIGH (ref 70–99)
GLUCOSE BLDC GLUCOMTR-MCNC: 249 MG/DL — HIGH (ref 70–99)
GLUCOSE SERPL-MCNC: 123 MG/DL — HIGH (ref 70–99)
GLUCOSE UR-MCNC: 70 — SIGNIFICANT CHANGE UP
HADV DNA SPEC QL NAA+PROBE: NOT DETECTED — SIGNIFICANT CHANGE UP
HBA1C BLD-MCNC: 7.3 % — HIGH (ref 4–5.6)
HCO3 BLDA-SCNC: 23 MMOL/L — SIGNIFICANT CHANGE UP (ref 22–26)
HCOV PNL SPEC NAA+PROBE: SIGNIFICANT CHANGE UP
HCT VFR BLD CALC: 33.2 % — LOW (ref 34.5–45)
HCT VFR BLD CALC: 34.2 % — LOW (ref 34.5–45)
HCV AB S/CO SERPL IA: 0.06 S/CO — SIGNIFICANT CHANGE UP (ref 0–0.99)
HCV AB SERPL-IMP: SIGNIFICANT CHANGE UP
HGB BLD-MCNC: 10.2 G/DL — LOW (ref 11.5–15.5)
HGB BLD-MCNC: 10.5 G/DL — LOW (ref 11.5–15.5)
HMPV RNA SPEC QL NAA+PROBE: NOT DETECTED — SIGNIFICANT CHANGE UP
HPIV1 RNA SPEC QL NAA+PROBE: NOT DETECTED — SIGNIFICANT CHANGE UP
HPIV2 RNA SPEC QL NAA+PROBE: NOT DETECTED — SIGNIFICANT CHANGE UP
HPIV3 RNA SPEC QL NAA+PROBE: NOT DETECTED — SIGNIFICANT CHANGE UP
HPIV4 RNA SPEC QL NAA+PROBE: NOT DETECTED — SIGNIFICANT CHANGE UP
KETONES UR-MCNC: NEGATIVE — SIGNIFICANT CHANGE UP
LEUKOCYTE ESTERASE UR-ACNC: NEGATIVE — SIGNIFICANT CHANGE UP
MAGNESIUM SERPL-MCNC: 2.3 MG/DL — SIGNIFICANT CHANGE UP (ref 1.6–2.6)
MCHC RBC-ENTMCNC: 28.3 PG — SIGNIFICANT CHANGE UP (ref 27–34)
MCHC RBC-ENTMCNC: 29.2 PG — SIGNIFICANT CHANGE UP (ref 27–34)
MCHC RBC-ENTMCNC: 29.8 % — LOW (ref 32–36)
MCHC RBC-ENTMCNC: 31.6 % — LOW (ref 32–36)
MCV RBC AUTO: 92.5 FL — SIGNIFICANT CHANGE UP (ref 80–100)
MCV RBC AUTO: 95 FL — SIGNIFICANT CHANGE UP (ref 80–100)
NITRITE UR-MCNC: NEGATIVE — SIGNIFICANT CHANGE UP
NRBC # FLD: 0 K/UL — SIGNIFICANT CHANGE UP (ref 0–0)
NRBC # FLD: 0 K/UL — SIGNIFICANT CHANGE UP (ref 0–0)
PCO2 BLDA: 31 MMHG — LOW (ref 32–48)
PH BLDA: 7.44 PH — SIGNIFICANT CHANGE UP (ref 7.35–7.45)
PH UR: 6 — SIGNIFICANT CHANGE UP (ref 5–8)
PHOSPHATE SERPL-MCNC: 3.9 MG/DL — SIGNIFICANT CHANGE UP (ref 2.5–4.5)
PLATELET # BLD AUTO: 303 K/UL — SIGNIFICANT CHANGE UP (ref 150–400)
PLATELET # BLD AUTO: 305 K/UL — SIGNIFICANT CHANGE UP (ref 150–400)
PMV BLD: 9.7 FL — SIGNIFICANT CHANGE UP (ref 7–13)
PMV BLD: 9.8 FL — SIGNIFICANT CHANGE UP (ref 7–13)
PO2 BLDA: 99 MMHG — SIGNIFICANT CHANGE UP (ref 83–108)
POTASSIUM SERPL-MCNC: 3.4 MMOL/L — LOW (ref 3.5–5.3)
POTASSIUM SERPL-SCNC: 3.4 MMOL/L — LOW (ref 3.5–5.3)
PROT UR-MCNC: NEGATIVE — SIGNIFICANT CHANGE UP
PTH-INTACT SERPL-MCNC: 115.4 PG/ML — HIGH (ref 15–65)
RBC # BLD: 3.59 M/UL — LOW (ref 3.8–5.2)
RBC # BLD: 3.6 M/UL — LOW (ref 3.8–5.2)
RBC # FLD: 16.8 % — HIGH (ref 10.3–14.5)
RBC # FLD: 16.9 % — HIGH (ref 10.3–14.5)
RSV RNA SPEC QL NAA+PROBE: NOT DETECTED — SIGNIFICANT CHANGE UP
RV+EV RNA SPEC QL NAA+PROBE: NOT DETECTED — SIGNIFICANT CHANGE UP
SAO2 % BLDA: 96.8 % — SIGNIFICANT CHANGE UP (ref 95–99)
SODIUM SERPL-SCNC: 143 MMOL/L — SIGNIFICANT CHANGE UP (ref 135–145)
SP GR SPEC: 1.01 — SIGNIFICANT CHANGE UP (ref 1–1.04)
TROPONIN T, HIGH SENSITIVITY: 78 NG/L — CRITICAL HIGH (ref ?–14)
TROPONIN T, HIGH SENSITIVITY: 85 NG/L — CRITICAL HIGH (ref ?–14)
TROPONIN T, HIGH SENSITIVITY: 87 NG/L — CRITICAL HIGH (ref ?–14)
TROPONIN T, HIGH SENSITIVITY: 902 NG/L — CRITICAL HIGH (ref ?–14)
TSH SERPL-MCNC: 1.59 UIU/ML — SIGNIFICANT CHANGE UP (ref 0.27–4.2)
UROBILINOGEN FLD QL: NORMAL — SIGNIFICANT CHANGE UP
WBC # BLD: 10.43 K/UL — SIGNIFICANT CHANGE UP (ref 3.8–10.5)
WBC # BLD: 10.77 K/UL — HIGH (ref 3.8–10.5)
WBC # FLD AUTO: 10.43 K/UL — SIGNIFICANT CHANGE UP (ref 3.8–10.5)
WBC # FLD AUTO: 10.77 K/UL — HIGH (ref 3.8–10.5)

## 2019-04-25 PROCEDURE — 71045 X-RAY EXAM CHEST 1 VIEW: CPT | Mod: 26

## 2019-04-25 PROCEDURE — 93306 TTE W/DOPPLER COMPLETE: CPT | Mod: 26

## 2019-04-25 PROCEDURE — 71250 CT THORAX DX C-: CPT | Mod: 26

## 2019-04-25 PROCEDURE — 93010 ELECTROCARDIOGRAM REPORT: CPT

## 2019-04-25 PROCEDURE — 99223 1ST HOSP IP/OBS HIGH 75: CPT | Mod: GC

## 2019-04-25 PROCEDURE — 78582 LUNG VENTILAT&PERFUS IMAGING: CPT | Mod: 26,GC

## 2019-04-25 RX ORDER — ACETAMINOPHEN 500 MG
650 TABLET ORAL EVERY 6 HOURS
Qty: 0 | Refills: 0 | Status: DISCONTINUED | OUTPATIENT
Start: 2019-04-25 | End: 2019-05-16

## 2019-04-25 RX ORDER — TICAGRELOR 90 MG/1
90 TABLET ORAL ONCE
Qty: 0 | Refills: 0 | Status: COMPLETED | OUTPATIENT
Start: 2019-04-25 | End: 2019-04-25

## 2019-04-25 RX ORDER — ASPIRIN/CALCIUM CARB/MAGNESIUM 324 MG
81 TABLET ORAL DAILY
Qty: 0 | Refills: 0 | Status: DISCONTINUED | OUTPATIENT
Start: 2019-04-26 | End: 2019-05-16

## 2019-04-25 RX ORDER — FUROSEMIDE 40 MG
20 TABLET ORAL ONCE
Qty: 0 | Refills: 0 | Status: COMPLETED | OUTPATIENT
Start: 2019-04-25 | End: 2019-04-25

## 2019-04-25 RX ORDER — POTASSIUM CHLORIDE 20 MEQ
40 PACKET (EA) ORAL ONCE
Qty: 0 | Refills: 0 | Status: COMPLETED | OUTPATIENT
Start: 2019-04-25 | End: 2019-04-25

## 2019-04-25 RX ORDER — HEPARIN SODIUM 5000 [USP'U]/ML
3500 INJECTION INTRAVENOUS; SUBCUTANEOUS ONCE
Qty: 0 | Refills: 0 | Status: COMPLETED | OUTPATIENT
Start: 2019-04-25 | End: 2019-04-25

## 2019-04-25 RX ORDER — FUROSEMIDE 40 MG
40 TABLET ORAL ONCE
Qty: 0 | Refills: 0 | Status: COMPLETED | OUTPATIENT
Start: 2019-04-25 | End: 2019-04-25

## 2019-04-25 RX ORDER — FUROSEMIDE 40 MG
80 TABLET ORAL EVERY 12 HOURS
Qty: 0 | Refills: 0 | Status: DISCONTINUED | OUTPATIENT
Start: 2019-04-25 | End: 2019-04-25

## 2019-04-25 RX ORDER — FUROSEMIDE 40 MG
80 TABLET ORAL ONCE
Qty: 0 | Refills: 0 | Status: COMPLETED | OUTPATIENT
Start: 2019-04-25 | End: 2019-04-25

## 2019-04-25 RX ORDER — METOPROLOL TARTRATE 50 MG
25 TABLET ORAL ONCE
Qty: 0 | Refills: 0 | Status: COMPLETED | OUTPATIENT
Start: 2019-04-25 | End: 2019-04-25

## 2019-04-25 RX ORDER — METOPROLOL TARTRATE 50 MG
50 TABLET ORAL
Qty: 0 | Refills: 0 | Status: DISCONTINUED | OUTPATIENT
Start: 2019-04-25 | End: 2019-04-26

## 2019-04-25 RX ORDER — ASPIRIN/CALCIUM CARB/MAGNESIUM 324 MG
244 TABLET ORAL DAILY
Qty: 0 | Refills: 0 | Status: DISCONTINUED | OUTPATIENT
Start: 2019-04-25 | End: 2019-04-25

## 2019-04-25 RX ORDER — FUROSEMIDE 40 MG
40 TABLET ORAL EVERY 12 HOURS
Qty: 0 | Refills: 0 | Status: DISCONTINUED | OUTPATIENT
Start: 2019-04-25 | End: 2019-04-25

## 2019-04-25 RX ORDER — HEPARIN SODIUM 5000 [USP'U]/ML
INJECTION INTRAVENOUS; SUBCUTANEOUS
Qty: 25000 | Refills: 0 | Status: DISCONTINUED | OUTPATIENT
Start: 2019-04-25 | End: 2019-04-26

## 2019-04-25 RX ORDER — HEPARIN SODIUM 5000 [USP'U]/ML
3500 INJECTION INTRAVENOUS; SUBCUTANEOUS EVERY 6 HOURS
Qty: 0 | Refills: 0 | Status: DISCONTINUED | OUTPATIENT
Start: 2019-04-25 | End: 2019-04-26

## 2019-04-25 RX ORDER — ASPIRIN/CALCIUM CARB/MAGNESIUM 324 MG
243 TABLET ORAL ONCE
Qty: 0 | Refills: 0 | Status: COMPLETED | OUTPATIENT
Start: 2019-04-25 | End: 2019-04-25

## 2019-04-25 RX ORDER — TICAGRELOR 90 MG/1
90 TABLET ORAL
Qty: 0 | Refills: 0 | Status: DISCONTINUED | OUTPATIENT
Start: 2019-04-26 | End: 2019-05-16

## 2019-04-25 RX ADMIN — ATORVASTATIN CALCIUM 40 MILLIGRAM(S): 80 TABLET, FILM COATED ORAL at 22:34

## 2019-04-25 RX ADMIN — Medication 243 MILLIGRAM(S): at 15:14

## 2019-04-25 RX ADMIN — HEPARIN SODIUM 500 UNIT(S)/HR: 5000 INJECTION INTRAVENOUS; SUBCUTANEOUS at 23:20

## 2019-04-25 RX ADMIN — Medication 80 MILLIGRAM(S): at 17:52

## 2019-04-25 RX ADMIN — HEPARIN SODIUM 5000 UNIT(S): 5000 INJECTION INTRAVENOUS; SUBCUTANEOUS at 05:25

## 2019-04-25 RX ADMIN — HEPARIN SODIUM 700 UNIT(S)/HR: 5000 INJECTION INTRAVENOUS; SUBCUTANEOUS at 15:17

## 2019-04-25 RX ADMIN — Medication 650 MILLIGRAM(S): at 11:51

## 2019-04-25 RX ADMIN — TICAGRELOR 90 MILLIGRAM(S): 90 TABLET ORAL at 15:14

## 2019-04-25 RX ADMIN — HEPARIN SODIUM 3500 UNIT(S): 5000 INJECTION INTRAVENOUS; SUBCUTANEOUS at 15:19

## 2019-04-25 RX ADMIN — HEPARIN SODIUM 5000 UNIT(S): 5000 INJECTION INTRAVENOUS; SUBCUTANEOUS at 13:32

## 2019-04-25 RX ADMIN — INSULIN GLARGINE 65 UNIT(S): 100 INJECTION, SOLUTION SUBCUTANEOUS at 22:37

## 2019-04-25 RX ADMIN — Medication 40 MILLIGRAM(S): at 05:25

## 2019-04-25 RX ADMIN — HEPARIN SODIUM 0 UNIT(S)/HR: 5000 INJECTION INTRAVENOUS; SUBCUTANEOUS at 22:18

## 2019-04-25 RX ADMIN — Medication 81 MILLIGRAM(S): at 13:32

## 2019-04-25 RX ADMIN — Medication 650 MILLIGRAM(S): at 10:51

## 2019-04-25 RX ADMIN — Medication 25 MILLIGRAM(S): at 13:51

## 2019-04-25 RX ADMIN — Medication 40 MILLIEQUIVALENT(S): at 08:44

## 2019-04-25 RX ADMIN — Medication 2: at 09:22

## 2019-04-25 RX ADMIN — Medication 30 UNIT(S): at 18:42

## 2019-04-25 RX ADMIN — Medication 50 MILLIGRAM(S): at 22:34

## 2019-04-25 RX ADMIN — Medication 30 UNIT(S): at 09:21

## 2019-04-25 RX ADMIN — Medication 25 MILLIGRAM(S): at 05:25

## 2019-04-25 RX ADMIN — Medication 20 MILLIGRAM(S): at 08:18

## 2019-04-25 RX ADMIN — TICAGRELOR 90 MILLIGRAM(S): 90 TABLET ORAL at 05:25

## 2019-04-25 RX ADMIN — Medication 1: at 12:53

## 2019-04-25 RX ADMIN — Medication 40 MILLIGRAM(S): at 09:28

## 2019-04-25 RX ADMIN — Medication 30 UNIT(S): at 12:53

## 2019-04-25 RX ADMIN — Medication 1: at 18:42

## 2019-04-25 NOTE — CONSULT NOTE ADULT - PROBLEM SELECTOR RECOMMENDATION 9
to me the SOB seems more due to chf rather then pneumonia: Her chest radiograp his reviewed and has left basilar atelectasis: sheh as been afbrile ans has minimal leucocytosis: Her rvp is negative: ct chest is pending: Hold off onto antibiotics till ct scan chest is done: would do dopplers

## 2019-04-25 NOTE — PHYSICAL THERAPY INITIAL EVALUATION ADULT - GENERAL OBSERVATIONS, REHAB EVAL
Patient received seated out of bed in a recliner with legs elevated , c/o pain on bilateral feet , daughter -in- law at bedside translating for the patient who is Amharic speaking.

## 2019-04-25 NOTE — PROGRESS NOTE ADULT - SUBJECTIVE AND OBJECTIVE BOX
[facility] [place] [facility]   MD RAHEEL SHAH MD ANGELA WONG, PA    TEL:  OFFICE: [phone]  DR SANTOYO CELL: [phone]  DR. NGUYEN CELL: [phone]  UM[facility] KENDALL CELL: [phone]        Patient is a 73y old  Female who presents with a chief complaint of sob (25 Apr 2019 14:42)      Patient seen and examined at bedside. +sob    VITALS:  T(F): 98 (04-25-19 @ 13:33), Max: 98.3 (04-24-19 @ 21:25)  HR: 76 (04-25-19 @ 13:33)  BP: 101/54 (04-25-19 @ 13:33)  RR: 19 (04-25-19 @ 13:33)  SpO2: 100% (04-25-19 @ 13:33)  Wt(kg): --    04-25 @ 07:01  -  04-25 @ 16:45  --------------------------------------------------------  IN: 0 mL / OUT: 450 mL / NET: -450 mL          PHYSICAL EXAM:  Constitutional: NAD  Neck: No JVD  Respiratory: b/l basilar crackle  Cardiovascular: S1, S2, RRR  Gastrointestinal: BS+, soft, NT/ND  Extremities: No peripheral edema    Hospital Medications:   MEDICATIONS  (STANDING):  atorvastatin 40 milliGRAM(s) Oral at bedtime  dextrose 5%. 1000 milliLiter(s) (50 mL/Hr) IV Continuous <Continuous>  dextrose 50% Injectable 12.5 Gram(s) IV Push once  dextrose 50% Injectable 25 Gram(s) IV Push once  dextrose 50% Injectable 25 Gram(s) IV Push once  furosemide   Injectable 80 milliGRAM(s) IV Push every 12 hours  heparin  Infusion.  Unit(s)/Hr (7 mL/Hr) IV Continuous <Continuous>  influenza   Vaccine 0.5 milliLiter(s) IntraMuscular once  insulin glargine Injectable (LANTUS) 65 Unit(s) SubCutaneous at bedtime  insulin lispro (HumaLOG) corrective regimen sliding scale   SubCutaneous three times a day before meals  insulin lispro (HumaLOG) corrective regimen sliding scale   SubCutaneous at bedtime  insulin lispro Injectable (HumaLOG) 30 Unit(s) SubCutaneous three times a day before meals  metoprolol tartrate 50 milliGRAM(s) Oral two times a day      LABS:  04-25    143  |  106  |  52<H>  ----------------------------<  123<H>  3.4<L>   |  21<L>  |  1.91<H>    Ca    9.6      25 Apr 2019 05:45  Phos  3.9     04-25  Mg     2.3     04-25    TPro  8.0  /  Alb  4.3  /  TBili  0.4  /  DBili      /  AST  16  /  ALT  10  /  AlkPhos  73  04-23    Creatinine Trend: 1.91 <--, 2.21 <--    Phosphorus Level, Serum: 3.9 mg/dL (04-25 @ 05:45)    calcium--  intact pth--  parathyroid hormone intact, [id number]                            10.2   10.77 )-----------( 305      ( 25 Apr 2019 05:45 )             34.2     Urine Studies:  Urinalysis - [04-25-19 @ 09:30]      Color LIGHT YELLOW / Appearance CLEAR / SG 1.011 / pH 6.0      Gluc 70 / Ketone NEGATIVE  / Bili NEGATIVE / Urobili NORMAL       Blood NEGATIVE / Protein NEGATIVE / Leuk Est NEGATIVE / Nitrite NEGATIVE      RBC  / WBC  / Hyaline  / Gran  / Sq Epi  / Non Sq Epi  / Bacteria       .4 (Ca --)      [04-25-19 @ 05:45]   --  PTH 43.55 (Ca --)      [09-10-18 @ 07:15]   --  PTH 36.60 (Ca --)      [09-08-18 @ 03:15]   --  Vitamin D (25OH) 15.8      [09-08-18 @ 03:15]  HbA1c 7.3      [04-25-19 @ 05:45]  TSH 1.59      [04-25-19 @ 05:45]  Lipid: chol 127, , HDL 22, LDL 67      [04-24-19 @ 12:21]    HCV 0.06, Nonreactive Hepatitis C AB  S/CO Ratio                        Interpretation  < 1.00                                   Non-Reactive  1.00 - 4.99                         Weakly-Reactive  > 5.00                                Reactive  Non-Reactive: A person with a non-reactive HCV antibody  result is considered uninfected.  No further action is  needed unless recent infection is suspected.  In these  cases, consider repeat testing later to detect  seroconversion..  Weakly-Reactive: HCV antibody test is abnormal, HCV RNA  Qualitative test will follow.  Reactive: HCV antibody test is abnormal, HCV RNA  Qualitative test will follow.  Note: HCV antibody testing is performed on the [employer]   system.      [04-25-19 @ 05:45]      RADIOLOGY & ADDITIONAL STUDIES:

## 2019-04-25 NOTE — CONSULT NOTE ADULT - SUBJECTIVE AND OBJECTIVE BOX
Patient is a 73y old  Female who presents with a chief complaint of sob (2019 13:46)      HPI:    73YOF with hx of CAD s/p CABG, hypothyroidism, CKD, CHF, HTN, DM p/w worsening ZEE and sob. Patient usually can walk up a few steps before feeling sob, currently feeling sob after walking from living room to kitchen.  She is using 3 pillows to sleep. No cough, fevers. (+) CP, SS and constant, She experienced more ZEE for last two days.  She has no edema in lower extremity.  Last admission to [facility] was . (2019 10:21)    ER vss.  Pt afebrile.  WBC 8.7 --> 10.7.  UA (-), RVP (-).   cxr with L hazy retrocardiac opacity.      ID consult called for further abx managment and evaluation for pna.       REVIEW OF SYSTEMS:    CONSTITUTIONAL: No fever, weight loss, or fatigue  EYES: No eye pain, visual disturbances, or discharge  ENMT:  No sore throat  NECK: No pain or stiffness  RESPIRATORY: No cough, wheezing, chills or hemoptysis; No shortness of breath  CARDIOVASCULAR: No chest pain, palpitations, dizziness, or leg swelling  GASTROINTESTINAL: No abdominal or epigastric pain. No nausea, vomiting, or hematemesis; No diarrhea or constipation. No melena or hematochezia.  GENITOURINARY: No dysuria, frequency, hematuria, or incontinence  NEUROLOGICAL: No headaches, memory loss, loss of strength, numbness, or tremors  SKIN: No itching, burning, rashes, or lesions   LYMPH NODES: No enlarged glands  MUSCULOSKELETAL: No joint pain or swelling; No muscle, back, or extremity pain      PAST MEDICAL & SURGICAL HISTORY:  GERD (gastroesophageal reflux disease)  CAD (coronary artery disease): s/p CABG  Hypothyroidism  Hyperlipidemia  Diabetes mellitus, type 2  S/P CABG (coronary artery bypass graft)      Allergies    No Known Allergies    Intolerances        FAMILY HISTORY:  Family history of hypertension (Sibling): sister  Family history of diabetes mellitus (Sibling): brothers/sisters      SOCIAL HISTORY:        MEDICATIONS  (STANDING):  aspirin 244 milliGRAM(s) Oral daily  aspirin enteric coated 81 milliGRAM(s) Oral daily  atorvastatin 40 milliGRAM(s) Oral at bedtime  dextrose 5%. 1000 milliLiter(s) (50 mL/Hr) IV Continuous <Continuous>  dextrose 50% Injectable 12.5 Gram(s) IV Push once  dextrose 50% Injectable 25 Gram(s) IV Push once  dextrose 50% Injectable 25 Gram(s) IV Push once  furosemide   Injectable 40 milliGRAM(s) IV Push every 12 hours  heparin  Infusion.  Unit(s)/Hr (7 mL/Hr) IV Continuous <Continuous>  heparin  Injectable 3500 Unit(s) IV Push once  heparin  Injectable 5000 Unit(s) SubCutaneous every 8 hours  influenza   Vaccine 0.5 milliLiter(s) IntraMuscular once  insulin glargine Injectable (LANTUS) 65 Unit(s) SubCutaneous at bedtime  insulin lispro (HumaLOG) corrective regimen sliding scale   SubCutaneous three times a day before meals  insulin lispro (HumaLOG) corrective regimen sliding scale   SubCutaneous at bedtime  insulin lispro Injectable (HumaLOG) 30 Unit(s) SubCutaneous three times a day before meals  metoprolol tartrate 50 milliGRAM(s) Oral two times a day  ticagrelor 90 milliGRAM(s) Oral once  ticagrelor 90 milliGRAM(s) Oral two times a day    MEDICATIONS  (PRN):  acetaminophen   Tablet .. 650 milliGRAM(s) Oral every 6 hours PRN Mild Pain (1 - 3), Moderate Pain (4 - 6), Severe Pain (7 - 10)  dextrose 40% Gel 15 Gram(s) Oral once PRN Blood Glucose LESS THAN 70 milliGRAM(s)/deciliter  glucagon  Injectable 1 milliGRAM(s) IntraMuscular once PRN Glucose LESS THAN 70 milligrams/deciliter  heparin  Injectable 3500 Unit(s) IV Push every 6 hours PRN For aPTT less than 40      Vital Signs Last 24 Hrs  T(C): 36.7 (2019 13:33), Max: 36.8 (2019 21:25)  T(F): 98 (2019 13:33), Max: 98.3 (2019 21:25)  HR: 76 (2019 13:33) (73 - 86)  BP: 101/54 (2019 13:33) (100/61 - 130/70)  BP(mean): --  RR: 19 (2019 13:33) (17 - 25)  SpO2: 100% (2019 13:33) (100% - 100%)    PHYSICAL EXAM:    GENERAL: NAD, well-groomed  HEAD:  Atraumatic, Normocephalic  EYES: EOMI, PERRLA, conjunctiva and sclera clear  ENMT: No tonsillar erythema, exudates, or enlargement; Moist mucous membranes  NECK: Supple, No JVD  CHEST/LUNG: Clear to percussion bilaterally; No rales, rhonchi, wheezing, or rubs  HEART: Regular rate and rhythm; No murmurs, rubs, or gallops  ABDOMEN: Soft, Nontender, Nondistended; Bowel sounds present  EXTREMITIES:  2+ Peripheral Pulses, No clubbing, cyanosis, or edema  LYMPH: No lymphadenopathy noted  SKIN: No rashes or lesions    LABS:  CBC Full  -  ( 2019 05:45 )  WBC Count : 10.77 K/uL  RBC Count : 3.60 M/uL  Hemoglobin : 10.2 g/dL  Hematocrit : 34.2 %  Platelet Count - Automated : 305 K/uL  Mean Cell Volume : 95.0 fL  Mean Cell Hemoglobin : 28.3 pg  Mean Cell Hemoglobin Concentration : 29.8 %  Auto Neutrophil # : x  Auto Lymphocyte # : x  Auto Monocyte # : x  Auto Eosinophil # : x  Auto Basophil # : x  Auto Neutrophil % : x  Auto Lymphocyte % : x  Auto Monocyte % : x  Auto Eosinophil % : x  Auto Basophil % : x      04-    143  |  106  |  52<H>  ----------------------------<  123<H>  3.4<L>   |  21<L>  |  1.91<H>    Ca    9.6      2019 05:45  Phos  3.9     -  Mg     2.3     -    TPro  8.0  /  Alb  4.3  /  TBili  0.4  /  DBili  x   /  AST  16  /  ALT  10  /  AlkPhos  73  04-23      LIVER FUNCTIONS - ( 2019 23:32 )  Alb: 4.3 g/dL / Pro: 8.0 g/dL / ALK PHOS: 73 u/L / ALT: 10 u/L / AST: 16 u/L / GGT: x                               MICROBIOLOGY:        Urinalysis Basic - ( 2019 09:30 )    Color: LIGHT YELLOW / Appearance: CLEAR / S.011 / pH: 6.0  Gluc: 70 / Ketone: NEGATIVE  / Bili: NEGATIVE / Urobili: NORMAL   Blood: NEGATIVE / Protein: NEGATIVE / Nitrite: NEGATIVE   Leuk Esterase: NEGATIVE / RBC: x / WBC x   Sq Epi: x / Non Sq Epi: x / Bacteria: x      Rapid Respiratory Viral Panel (19 @ 09:44)    Adenovirus (RapRVP): Not Detected    Influenza A (RapRVP): Not Detected    Influenza AH1 2009 (RapRVP): Not Detected    Influenza AH1 (RapRVP): Not Detected    Influenza AH3 (RapRVP): Not Detected    Influenza B (RapRVP): Not Detected    Parainfluenza 1 (RapRVP): Not Detected    Parainfluenza 2 (RapRVP): Not Detected    Parainfluenza 3 (RapRVP): Not Detected    Parainfluenza 4 (RapRVP): Not Detected    Resp Syncytial Virus (RapRVP): Not Detected    Chlamydia pneumoniae (RapRVP): Not Detected    Mycoplasma pneumoniae (RapRVP): Not Detected    Entero/Rhinovirus (RapRVP): Not Detected    hMPV (RapRVP): Not Detected    Coronavirus (229E,HKU1,NL63,OC43): Not Detected This Respiratory Panel uses polymerase chain reaction (PCR)  to detect for adenovirus; coronavirus (HKU1, NL63, 229E,  OC43); human metapneumovirus (hMPV); human  enterovirus/rhinovirus (Entero/RV); influenza A; influenza  A/H1: influenza A/H3; influenza A/H1-2009; influenza B;  parainfluenza viruses 1,2,3,4; respiratory syncytial virus;  Mycoplasma pneumoniae; and Chlamydophila pneumoniae.              RADIOLOGY:    < from: Xray Chest 1 View- PORTABLE-Urgent (19 @ 08:45) >  INTERPRETATION:     Heart size and the mediastinum cannot be accurately evaluated on this   projection. Median sternotomy sutures, coronary artery stent, and   surgical clips are again seen. The thoracic aorta is calcified.  The right lung is clear.  Hazy left retrocardiac opacity with incomplete definition of the right   hemidiaphragm. The left upper and mid lung is clear.  No pleural effusion or pneumothorax is seen.              IMPRESSION:  Hazy left retrocardiac opacity which may be due to   subsegmental atelectasis and/or developing pneumonia.    < end of copied text > Patient is a 73y old  Female who presents with a chief complaint of sob (2019 13:46)      HPI:    73YOF with hx of CAD s/p CABG, hypothyroidism, CKD, CHF, HTN, DM p/w worsening ZEE and sob. Patient usually can walk up a few steps before feeling sob, currently feeling sob after walking from living room to kitchen.  She is using 3 pillows to sleep. No cough, fevers. (+) CP, SS and constant, She experienced more ZEE for last two days.  She has no edema in lower extremity.  [facility] admission to [facility] was . (2019 10:21)    ER vss.  Pt afebrile.  WBC 8.7 --> 10.7.  UA (-), RVP (-).   cxr with L hazy retrocardiac opacity.   Pt found to have NSTEMI and gross fluid overload, started on IV lasix.  She is pending LHC.        ID consult called for further abx managment and evaluation for pna.       REVIEW OF SYSTEMS:    CONSTITUTIONAL: No fever, weight loss, or fatigue  EYES: No eye pain, visual disturbances, or discharge  ENMT:  No sore throat  NECK: No pain or stiffness  RESPIRATORY: sob, ZEE  CARDIOVASCULAR: No chest pain, palpitations, dizziness, or leg swelling  GASTROINTESTINAL: No abdominal or epigastric pain. No nausea, vomiting, or hematemesis; No diarrhea or constipation. No melena or hematochezia.  GENITOURINARY: No dysuria, frequency, hematuria, or incontinence  NEUROLOGICAL: No headaches, memory loss, loss of strength, numbness, or tremors  SKIN: No itching, burning, rashes, or lesions   LYMPH NODES: No enlarged glands  MUSCULOSKELETAL: No joint pain or swelling; No muscle, back, or extremity pain      PAST MEDICAL & SURGICAL HISTORY:  GERD (gastroesophageal reflux disease)  CAD (coronary artery disease): s/p CABG  Hypothyroidism  Hyperlipidemia  Diabetes mellitus, type 2  S/P CABG (coronary artery bypass graft)      Allergies    No Known Allergies    Intolerances        FAMILY HISTORY:  Family history of hypertension (Sibling): sister  Family history of diabetes mellitus (Sibling): brothers/sisters      SOCIAL HISTORY:  ,   in [facility], She lives with daughter and son-in-law  Does not smoke nor drink      MEDICATIONS  (STANDING):  aspirin 244 milliGRAM(s) Oral daily  aspirin enteric coated 81 milliGRAM(s) Oral daily  atorvastatin 40 milliGRAM(s) Oral at bedtime  dextrose 5%. 1000 milliLiter(s) (50 mL/Hr) IV Continuous <Continuous>  dextrose 50% Injectable 12.5 Gram(s) IV Push once  dextrose 50% Injectable 25 Gram(s) IV Push once  dextrose 50% Injectable 25 Gram(s) IV Push once  furosemide   Injectable 40 milliGRAM(s) IV Push every 12 hours  heparin  Infusion.  Unit(s)/Hr (7 mL/Hr) IV Continuous <Continuous>  heparin  Injectable 3500 Unit(s) IV Push once  heparin  Injectable 5000 Unit(s) SubCutaneous every 8 hours  influenza   Vaccine 0.5 milliLiter(s) IntraMuscular once  insulin glargine Injectable (LANTUS) 65 Unit(s) SubCutaneous at bedtime  insulin lispro (HumaLOG) corrective regimen sliding scale   SubCutaneous three times a day before meals  insulin lispro (HumaLOG) corrective regimen sliding scale   SubCutaneous at bedtime  insulin lispro Injectable (HumaLOG) 30 Unit(s) SubCutaneous three times a day before meals  metoprolol tartrate 50 milliGRAM(s) Oral two times a day  ticagrelor 90 milliGRAM(s) Oral once  ticagrelor 90 milliGRAM(s) Oral two times a day    MEDICATIONS  (PRN):  acetaminophen   Tablet .. 650 milliGRAM(s) Oral every 6 hours PRN Mild Pain (1 - 3), Moderate Pain (4 - 6), Severe Pain (7 - 10)  dextrose 40% Gel 15 Gram(s) Oral once PRN Blood Glucose LESS THAN 70 milliGRAM(s)/deciliter  glucagon  Injectable 1 milliGRAM(s) IntraMuscular once PRN Glucose LESS THAN 70 milligrams/deciliter  heparin  Injectable 3500 Unit(s) IV Push every 6 hours PRN For aPTT less than 40      Vital Signs Last 24 Hrs  T(C): 36.7 (2019 13:33), Max: 36.8 (2019 21:25)  T(F): 98 (2019 13:33), Max: 98.3 (2019 21:25)  HR: 76 (2019 13:33) (73 - 86)  BP: 101/54 (2019 13:33) (100/61 - 130/70)  BP(mean): --  RR: 19 (2019 13:33) (17 - 25)  SpO2: 100% (2019 13:33) (100% - 100%)    PHYSICAL EXAM:    GENERAL: NAD, well-groomed  HEAD:  Atraumatic, Normocephalic  EYES: EOMI, PERRLA, conjunctiva and sclera clear  ENMT: No tonsillar erythema, exudates, or enlargement; Moist mucous membranes  NECK: Supple, No JVD  CHEST/LUNG: Clear to percussion bilaterally; No rales, rhonchi, wheezing, or rubs  HEART: Regular rate and rhythm; No murmurs, rubs, or gallops  ABDOMEN: Soft, Nontender, Nondistended; Bowel sounds present  EXTREMITIES:  2+ Peripheral Pulses, No clubbing, cyanosis, or edema  LYMPH: No lymphadenopathy noted  SKIN: No rashes or lesions    LABS:  CBC Full  -  ( 2019 05:45 )  WBC Count : 10.77 K/uL  RBC Count : 3.60 M/uL  Hemoglobin : 10.2 g/dL  Hematocrit : 34.2 %  Platelet Count - Automated : 305 K/uL  Mean Cell Volume : 95.0 fL  Mean Cell Hemoglobin : 28.3 pg  Mean Cell Hemoglobin Concentration : 29.8 %  Auto Neutrophil # : x  Auto Lymphocyte # : x  Auto Monocyte # : x  Auto Eosinophil # : x  Auto Basophil # : x  Auto Neutrophil % : x  Auto Lymphocyte % : x  Auto Monocyte % : x  Auto Eosinophil % : x  Auto Basophil % : x      04-25    143  |  106  |  52<H>  ----------------------------<  123<H>  3.4<L>   |  21<L>  |  1.91<H>    Ca    9.6      2019 05:45  Phos  3.9     04-25  Mg     2.3     04-25    TPro  8.0  /  Alb  4.3  /  TBili  0.4  /  DBili  x   /  AST  16  /  ALT  10  /  AlkPhos  73  04-23      LIVER FUNCTIONS - ( 2019 23:32 )  Alb: 4.3 g/dL / Pro: 8.0 g/dL / ALK PHOS: 73 u/L / ALT: 10 u/L / AST: 16 u/L / GGT: x                               MICROBIOLOGY:        Urinalysis Basic - ( 2019 09:30 )    Color: LIGHT YELLOW / Appearance: CLEAR / S.011 / pH: 6.0  Gluc: 70 / Ketone: NEGATIVE  / Bili: NEGATIVE / Urobili: NORMAL   Blood: NEGATIVE / Protein: NEGATIVE / Nitrite: NEGATIVE   Leuk Esterase: NEGATIVE / RBC: x / WBC x   Sq Epi: x / Non Sq Epi: x / Bacteria: x      Rapid Respiratory Viral Panel (19 @ 09:44)    Adenovirus (RapRVP): Not Detected    Influenza A (RapRVP): Not Detected    Influenza AH1 2009 (RapRVP): Not Detected    Influenza AH1 (RapRVP): Not Detected    Influenza AH3 (RapRVP): Not Detected    Influenza B (RapRVP): Not Detected    Parainfluenza 1 (RapRVP): Not Detected    Parainfluenza 2 (RapRVP): Not Detected    Parainfluenza 3 (RapRVP): Not Detected    Parainfluenza 4 (RapRVP): Not Detected    Resp Syncytial Virus (RapRVP): Not Detected    Chlamydia pneumoniae (RapRVP): Not Detected    Mycoplasma pneumoniae (RapRVP): Not Detected    Entero/Rhinovirus (RapRVP): Not Detected    hMPV (RapRVP): Not Detected    Coronavirus (229E,HKU1,NL63,OC43): Not Detected This Respiratory Panel uses polymerase chain reaction (PCR)  to detect for adenovirus; coronavirus (HKU1, NL63, 229E,  OC43); human metapneumovirus (hMPV); human  enterovirus/rhinovirus (Entero/RV); influenza A; influenza  A/H1: influenza A/H3; influenza A/H1-2009; influenza B;  parainfluenza viruses 1,2,3,4; respiratory syncytial virus;  Mycoplasma pneumoniae; and Chlamydophila pneumoniae.              RADIOLOGY:    < from: Xray Chest 1 View- PORTABLE-Urgent (19 @ 08:45) >  INTERPRETATION:     Heart size and the mediastinum cannot be accurately evaluated on this   projection. Median sternotomy sutures, coronary artery stent, and   surgical clips are again seen. The thoracic aorta is calcified.  The right lung is clear.  Hazy left retrocardiac opacity with incomplete definition of the right   hemidiaphragm. The left upper and mid lung is clear.  No pleural effusion or pneumothorax is seen.              IMPRESSION:  Hazy left retrocardiac opacity which may be due to   subsegmental atelectasis and/or developing pneumonia.    < end of copied text >

## 2019-04-25 NOTE — PROGRESS NOTE ADULT - ATTENDING COMMENTS
Patient seen and examined.  Agree with above.   -continue with IV diuresis   -CXR with ? opacity - check non con ct chest  -appreciate pulm eval  -check LE dopplers and vq scan    Corie Ga MD

## 2019-04-25 NOTE — PROGRESS NOTE ADULT - SUBJECTIVE AND OBJECTIVE BOX
S: Patient had increased SOB this morning and recurrent substernal chest pain without radiation. Review of systems otherwise (-)  	    MEDICATIONS  (STANDING):  aspirin enteric coated 81 milliGRAM(s) Oral daily  atorvastatin 40 milliGRAM(s) Oral at bedtime  dextrose 5%. 1000 milliLiter(s) (50 mL/Hr) IV Continuous <Continuous>  dextrose 50% Injectable 12.5 Gram(s) IV Push once  dextrose 50% Injectable 25 Gram(s) IV Push once  dextrose 50% Injectable 25 Gram(s) IV Push once  furosemide   Injectable 40 milliGRAM(s) IV Push every 12 hours  heparin  Injectable 5000 Unit(s) SubCutaneous every 8 hours  influenza   Vaccine 0.5 milliLiter(s) IntraMuscular once  insulin glargine Injectable (LANTUS) 65 Unit(s) SubCutaneous at bedtime  insulin lispro (HumaLOG) corrective regimen sliding scale   SubCutaneous three times a day before meals  insulin lispro (HumaLOG) corrective regimen sliding scale   SubCutaneous at bedtime  insulin lispro Injectable (HumaLOG) 30 Unit(s) SubCutaneous three times a day before meals  metoprolol tartrate 25 milliGRAM(s) Oral two times a day  ticagrelor 90 milliGRAM(s) Oral two times a day      LABS:	 	                          10.2   10.77 )-----------( 305      ( 25 Apr 2019 05:45 )             34.2     Hemoglobin: 10.2 g/dL (04-25 @ 05:45)  Hemoglobin: 10.2 g/dL (04-23 @ 23:20)    04-25    143  |  106  |  52<H>  ----------------------------<  123<H>  3.4<L>   |  21<L>  |  1.91<H>    Ca    9.6      25 Apr 2019 05:45  Phos  3.9     04-25  Mg     2.3     04-25    TPro  8.0  /  Alb  4.3  /  TBili  0.4  /  DBili  x   /  AST  16  /  ALT  10  /  AlkPhos  73  04-23    Creatinine Trend: 1.91<--, 2.21<--    proBNP: Serum Pro-Brain Natriuretic Peptide: 1779 pg/mL (04-24 @ 12:21)    HgA1c: Hemoglobin A1C, Whole Blood: 7.3 % (04-25 @ 05:45)  Hemoglobin A1C, Whole Blood: 7.2 % (04-24 @ 13:50)    TSH: Thyroid Stimulating Hormone, Serum: 1.59 uIU/mL (04-25 @ 05:45)      PHYSICAL EXAM:  T(C): 36.6 (04-25-19 @ 09:27), Max: 36.8 (04-24-19 @ 21:25)  HR: 75 (04-25-19 @ 09:27) (73 - 86)  BP: 100/61 (04-25-19 @ 09:27) (100/61 - 130/70)  RR: 19 (04-25-19 @ 09:27) (17 - 25)  SpO2: 100% (04-25-19 @ 09:27) (98% - 100%)  Wt(kg): --  I&O's Summary      Gen: Slight resp distress  HEENT:  (-)icterus (-)pallor  CV: N S1 S2 1/6 YUSUF (+)2 Pulses B/l  Resp:  Tachypneic, Bibasilar crackles, normal effort  GI: (+) BS Soft, NT, ND  Lymph:  (-)Edema, (-)obvious lymphadenopathy  Skin: Warm to touch, Normal turgor  Psych: Appropriate mood and affect    TELEMETRY: SR 70s	    ECG: NSR 72 bpm, flattened T wave in I/aVL (unchanged compared to previous EKGs)    < from: TTE with Doppler (w/Cont) (09.06.18 @ 15:21) >    Ejection Fraction (Teicholtz): 37 %    CONCLUSIONS:  1. Mitral annular calcification, otherwise normal mitral  valve. Mild mitral regurgitation.  2. Normal left ventricular internal dimensions and wall  thicknesses.  3. Endocardium not well visualized; grossly moderate to  severe global left ventricular systolic dysfunction.  The  inferolateral wall appears particularly hypokinetic.  Endocardial visualization enhanced with intravenous  injection of echo contrast (Definity).  4. The right ventricle is not well visualized; grossly  normal right ventricular systolic function.    < end of copied text >      ASSESSMENT/PLAN:  74 y/o female with hx of CAD s/p CABG, hypothyroidism, CKD, CHF (EF 37%, HTN, DM p/w worsening ZEE and sob. Admitted with CHF exacerbation.    -- Repeat EKG - NSR, no acute ischemic EKG changes  -- Elevated trop	- 65->63->78, CK negative, unlikely ACS - will continue to trend trop  -- Patient with borderline d-dimer however unable to have CTA at this time due to MERVIN and likely unable to lay flat at this time for V/Q scan  -- Additional 60mg IV lasix given this morning  -- Continue IV lasix 40 BID, daily weight, Strict I/O - Keep O>I  -- ABG okay - O2 sats well on NC - Will hold off on Bipap currently unless decompensates further  -- Patient noted to have leukocytosis - UA neg, will check BCx, RVP  -- F/u repeat CXR  -- Awaiting repeat TTE  -- Renal consult appreciated, trend cr  -- Pulm consult called - Dr. Uribe  -- Will monitor closely for improvement  -- Consider CCU if patient decompensates further despite interventions  -- Further cardiac w/u pending above

## 2019-04-25 NOTE — DIETITIAN INITIAL EVALUATION ADULT. - DIET TYPE
consistent carbohydrate (no snacks)/1200ml/DASH/TLC (sodium and cholesterol restricted diet)/low sodium

## 2019-04-25 NOTE — DIETITIAN INITIAL EVALUATION ADULT. - ORAL INTAKE PTA
fair/Per daughter in law- patient with fair PO intake prior to admission for about 1 week- reported patient did not having much of an appetite and had no taste. Usual food recall: bread, rice, chicken, fish, eggs, dairy- no beef, no pork.

## 2019-04-25 NOTE — CONSULT NOTE ADULT - ASSESSMENT
Pt is a 72 yo woman with a PMHx of CAD s/p CABG, hypothyroidism, CKD, HFrEF (EF 37%), HTN, DM presenting on 4/24 with worsening SOB and ZEE in the setting of ADHF, now with NSTEMI.    #NSTEMI: HsTrop 79->908  -Load w/ asa/brilinta/heparin then heparin gtt  -Continue statin  -On metoprolol  (Hold for SBP <100)  -Trend HsTrop/CKMB Q3-4H, serial EKGs  -Pt grossly fluid overloaded on exam- will need to be optimized prior to [facility].   -Increase lasix to 80 IV BID, fluid restrict, monitor I/Os, daily weights   -Monitor lytes, cr  -Supplemental O2   -Stat TTE now  -Low threshold for CCU if pt w/ worsening CP, respiratory status, BP. Discussed with Dr. Guerra and CCU fellow  -Please call [phone] w/ any questions or changes in clinical status

## 2019-04-25 NOTE — CONSULT NOTE ADULT - SUBJECTIVE AND OBJECTIVE BOX
Patient seen and evaluated at bedside    Chief Complaint: SOB    HPI:  Pt is a 72 yo woman with a PMHx of CAD s/p CABG, hypothyroidism, CKD, HFrEF (EF 37%), HTN, DM presenting on  with  worsening SOB and ZEE. Patient usually can walk up a few steps before feeling sob, currently feeling sob after walking from living room to kitchen.  She is using 3 pillows to sleep. No cough, fevers. (+) CP, SS and constant, she experienced more ZEE for last two days.  No LE edema.     On admission, T 97.4, /52, HR 82, RR 18, satting 98% on RA. BUN 58, Cr 2.21->1.91 (bl 1.5-1.8). HsTrop 65->63, CKMB 2.98, BNP 2552. Tx with IV lasix    Today, pt w/ continued CP. HsTrop 78->902. CKMB 7.66. EKG w/ new flattening of T waves        PMH:   Urinary tract infection  GERD (gastroesophageal reflux disease)  Hypertension  CAD (coronary artery disease)  Hypothyroidism  Hyperlipidemia  Diabetes mellitus, type 2  Diabetes mellitus type 2 in nonobese      PSH:   S/P CABG (coronary artery bypass graft)      Medications:   acetaminophen   Tablet .. 650 milliGRAM(s) Oral every 6 hours PRN  aspirin enteric coated 81 milliGRAM(s) Oral daily  atorvastatin 40 milliGRAM(s) Oral at bedtime  dextrose 40% Gel 15 Gram(s) Oral once PRN  dextrose 5%. 1000 milliLiter(s) IV Continuous <Continuous>  dextrose 50% Injectable 12.5 Gram(s) IV Push once  dextrose 50% Injectable 25 Gram(s) IV Push once  dextrose 50% Injectable 25 Gram(s) IV Push once  furosemide   Injectable 40 milliGRAM(s) IV Push every 12 hours  glucagon  Injectable 1 milliGRAM(s) IntraMuscular once PRN  heparin  Injectable 5000 Unit(s) SubCutaneous every 8 hours  influenza   Vaccine 0.5 milliLiter(s) IntraMuscular once  insulin glargine Injectable (LANTUS) 65 Unit(s) SubCutaneous at bedtime  insulin lispro (HumaLOG) corrective regimen sliding scale   SubCutaneous three times a day before meals  insulin lispro (HumaLOG) corrective regimen sliding scale   SubCutaneous at bedtime  insulin lispro Injectable (HumaLOG) 30 Unit(s) SubCutaneous three times a day before meals  metoprolol tartrate 25 milliGRAM(s) Oral once  ticagrelor 90 milliGRAM(s) Oral two times a day      Allergies:  No Known Allergies      FAMILY HISTORY:  Family history of hypertension (Sibling): sister  Family history of diabetes mellitus (Sibling): brothers/sisters      Social History:  Smoking History:  Alcohol Use:  Drug Use:    Review of Systems:  REVIEW OF SYSTEMS:  CONSTITUTIONAL: No weakness, fevers or chills  EYES/ENT: No visual changes;  No dysphagia  NECK: No pain or stiffness  RESPIRATORY: No cough, wheezing, hemoptysis; No shortness of breath  CARDIOVASCULAR: No chest pain or palpitations; No lower extremity edema  GASTROINTESTINAL: No abdominal or epigastric pain. No nausea, vomiting, or hematemesis; No diarrhea or constipation. No melena or hematochezia.  BACK: No back pain  GENITOURINARY: No dysuria, frequency or hematuria  NEUROLOGICAL: No numbness or weakness  SKIN: No itching, burning, rashes, or lesions   All other review of systems is negative unless indicated above.    Physical Exam:  T(F): 98 (-), Max: 98.3 (-24)  HR: 76 () (73 - 86)  BP: 101/54 () (100/61 - 130/70)  RR: 19 (-)  SpO2: 100% ()  GENERAL: No acute distress, well-developed  HEAD:  Atraumatic, Normocephalic  ENT: EOMI, PERRLA, conjunctiva and sclera clear, Neck supple, No JVD, moist mucosa  CHEST/LUNG: Clear to auscultation bilaterally; No wheeze, equal breath sounds bilaterally   BACK: No spinal tenderness  HEART: Regular rate and rhythm; No murmurs, rubs, or gallops  ABDOMEN: Soft, Nontender, Nondistended; Bowel sounds present  EXTREMITIES:  No clubbing, cyanosis, or edema  PSYCH: Nl behavior, nl affect  NEUROLOGY: AAOx3, non-focal, cranial nerves intact  SKIN: Normal color, No rashes or lesions  LINES:    Cardiovascular Diagnostic Testing:    ECG: Personally reviewed    Echo:  TTE 2018:  ------------------------------------------------------------------------  DIMENSIONS:  Dimensions:     Normal Values:  LA:     2.9 cm    2.0 - 4.0 cm  Ao:     3.0 cm    2.0 - 3.8 cm  SEPTUM: 0.8 cm    0.6 - 1.2 cm  PWT:    0.9 cm    0.6 - 1.1 cm  LVIDd:  5.0 cm    3.0 - 5.6 cm  LVIDs:  4.1 cm    1.8 - 4.0 cm  Derived Variables:  LVMI: 96 g/m2  RWT: 0.36  Fractional short: 18 %  Ejection Fraction (Teicholtz): 37 %  ------------------------------------------------------------------------  OBSERVATIONS:  Mitral Valve: Mitral annular calcification, otherwise  normal mitralvalve. Mild mitral regurgitation.  Aortic Root: Normal aortic root.  Aortic Valve: Aortic valve leaflet morphology not well  visualized.  Left Atrium: Normal left atrium.  Left Ventricle: Endocardium not well visualized; grossly  moderate to severe global left ventricular systolic  dysfunction.  The inferolateral wall appears particularly  hypokinetic.  Endocardial visualization enhanced with  intravenous injection of echo contrast (Definity). Normal  left ventricular internal dimensions and wallthicknesses.  Right Heart: Normal right atrium. The right ventricle is  not well visualized; grossly normal right ventricular  systolic function. Normal tricuspid valve. Minimal  tricuspid regurgitation. Normal pulmonic valve.  Pericardium/PleuraNormal pericardium with no pericardial  effusion.  ------------------------------------------------------------------------  CONCLUSIONS:  1. Mitral annular calcification, otherwise normal mitral  valve. Mild mitral regurgitation.  2. Normal left ventricular internal dimensions and wall  thicknesses.  3. Endocardium not well visualized; grossly moderate to  severe global left ventricular systolic dysfunction.  The  inferolateral wall appears particularly hypokinetic.  Endocardial visualization enhanced with intravenous  injection of echo contrast (Definity).  4. The right ventricle is not well visualized; grossly  normal right ventricular systolic function.  ------------------------------------------------------------------------  Confirmed on  2018 - 16:28:43 by Jose Lucia M.D.  ------------------------------------------------------------------------        Stress Testing:    Cath:  VENTRICLES: No left ventriculogram was performed.  CORONARY VESSELS:  RI:   --  Ramus intermedius: There was a diffuse 90 % stenosis at the site  of a prior stent, . The lesion was smoothly contoured. There  was PARUL grade 3 flow through the vessel (brisk flow) and a moderate-sized  vascular territory distal to the lesion. This is a likely culprit for the  patient's clinical presentation. An intervention was performed.  COMPLICATIONS: There were no complications.  DIAGNOSTIC IMPRESSIONS: Successful PCI to severe ISR of Ramus using 3.0  Promus CORNELIA  LVEDP 32mmhG  DIAGNOSTIC RECOMMENDATIONS: Continue DAPT  Optimize heart failure therapy  INTERVENTIONAL IMPRESSIONS: Successful PCI to severe ISR of Ramus using 3.0  Promus CORNELIA  LVEDP 32mmhG  INTERVENTIONAL RECOMMENDATIONS: Continue DAPT  Optimize heart failure therapy  Prepared and signed by  Gretchen Guerra M.D.    Interpretation of Telemetry:    Imaging:    Portable CXR: 2019     INTERPRETATION:     Heart size and the mediastinum cannot be accurately evaluated on this   projection. Median sternotomy sutures, coronary artery stent, and   surgical clips are again seen. The thoracic aorta is calcified.  The right lung is clear.  Hazy left retrocardiac opacity with incomplete definition of the right   hemidiaphragm. The left upper and mid lung is clear.  No pleural effusion or pneumothorax is seen.      IMPRESSION:  Hazy left retrocardiac opacity which may be due to   subsegmental atelectasis and/or developing pneumonia.    CATRACHITA KEENAN M.D., ATTENDING RADIOLOGIST  This document has been electronically signed. 2019 11:07AM                    Labs: Personally reviewed                        10.2   1077 )-----------( 305      ( 2019 05:45 )             34.2         143  |  106  |  52<H>  ----------------------------<  123<H>  3.4<L>   |  21<L>  |  1.91<H>    Ca    9.6      2019 05:45  Phos  3.9       Mg     2.3         TPro  8.0  /  Alb  4.3  /  TBili  0.4  /  DBili  x   /  AST  16  /  ALT  10  /  AlkPhos  73      PT/INR - ( 2019 23:32 )   PT: 12.1 SEC;   INR: 1.06          PTT - ( 2019 23:32 )  PTT:47.4 SEC  CARDIAC MARKERS ( 2019 09:23 )  x     / x     / 73 u/L / 2.98 ng/mL / x          Serum Pro-Brain Natriuretic Peptide: 1779 pg/mL ( @ 12:21)  Serum Pro-Brain Natriuretic Peptide: 2552 pg/mL ( @ 23:32)    Total Cholesterol: 127  LDL: 67  HDL: 22  T    Hemoglobin A1C, Whole Blood: 7.3 % ( @ 05:45)  Hemoglobin A1C, Whole Blood: 7.2 % ( @ 13:50)    Thyroid Stimulating Hormone, Serum: 1.59 uIU/mL ( @ 05:45) Patient seen and evaluated at bedside    Chief Complaint: SOB    HPI:  Pt is a 74 yo woman with a PMHx of CAD s/p CABG, hypothyroidism, CKD, HFrEF (EF 37%), HTN, DM presenting on  with  worsening SOB and ZEE. Patient usually can walk up a few steps before feeling sob, currently feeling sob after walking from living room to kitchen.  She is using 3 pillows to sleep. No cough, fevers. (+) CP, SS and constant, she experienced more ZEE for last two days.  No LE edema.     On admission, T 97.4, /52, HR 82, RR 18, satting 98% on RA. BUN 58, Cr 2.21->1.91 (bl 1.5-1.8). HsTrop 65->63, CKMB 2.98, BNP 2552. EKG NSR w/ PVCs. Tx with IV lasix    Today, pt w/ continued CP. HsTrop 78->902. CKMB 7.66. EKG w/ new flattening of T waves in V1-V2.     On my exam, vitals /54, HR 76, satting 100% on NC. Appearing dyspneic. Complaining of intermittent substernal CP. JVD above the mandible while sitting upright. Crackles in bilateral lung fields.       PMH:   Urinary tract infection  GERD (gastroesophageal reflux disease)  Hypertension  CAD (coronary artery disease)  Hypothyroidism  Hyperlipidemia  Diabetes mellitus, type 2  Diabetes mellitus type 2 in nonobese      PSH:   S/P CABG (coronary artery bypass graft)      Medications:   acetaminophen   Tablet .. 650 milliGRAM(s) Oral every 6 hours PRN  aspirin enteric coated 81 milliGRAM(s) Oral daily  atorvastatin 40 milliGRAM(s) Oral at bedtime  dextrose 40% Gel 15 Gram(s) Oral once PRN  dextrose 5%. 1000 milliLiter(s) IV Continuous <Continuous>  dextrose 50% Injectable 12.5 Gram(s) IV Push once  dextrose 50% Injectable 25 Gram(s) IV Push once  dextrose 50% Injectable 25 Gram(s) IV Push once  furosemide   Injectable 40 milliGRAM(s) IV Push every 12 hours  glucagon  Injectable 1 milliGRAM(s) IntraMuscular once PRN  heparin  Injectable 5000 Unit(s) SubCutaneous every 8 hours  influenza   Vaccine 0.5 milliLiter(s) IntraMuscular once  insulin glargine Injectable (LANTUS) 65 Unit(s) SubCutaneous at bedtime  insulin lispro (HumaLOG) corrective regimen sliding scale   SubCutaneous three times a day before meals  insulin lispro (HumaLOG) corrective regimen sliding scale   SubCutaneous at bedtime  insulin lispro Injectable (HumaLOG) 30 Unit(s) SubCutaneous three times a day before meals  metoprolol tartrate 25 milliGRAM(s) Oral once  ticagrelor 90 milliGRAM(s) Oral two times a day      Allergies:  No Known Allergies      FAMILY HISTORY:  Family history of hypertension (Sibling): sister  Family history of diabetes mellitus (Sibling): brothers/sisters      Social History:  Smoking History: Denies  Alcohol Use: Denies  Drug Use: Denies    Review of Systems:  REVIEW OF SYSTEMS:  CONSTITUTIONAL: No weakness, fevers or chills  EYES/ENT: No visual changes;  No dysphagia  NECK: No pain or stiffness  RESPIRATORY: +SOB, ZEE  CARDIOVASCULAR: +CP  GASTROINTESTINAL: No abdominal or epigastric pain. No nausea, vomiting, or hematemesis; No diarrhea or constipation. No melena or hematochezia.  BACK: No back pain  GENITOURINARY: No dysuria, frequency or hematuria  NEUROLOGICAL: No numbness or weakness  SKIN: No itching, burning, rashes, or lesions   All other review of systems is negative unless indicated above.    Physical Exam:  T(F): 98 (-), Max: 98.3 (-24)  HR: 76 (-) (73 - 86)  BP: 101/54 (-) (100/61 - 130/70)  RR: 19 (-25)  SpO2: 100% (-)  GENERAL: Mod distress  HEAD:  Atraumatic, Normocephalic  ENT: EOMI, PERRLA, conjunctiva and sclera clear, Neck supple  CHEST/LUNG: B/l crackles  HEART: Regular rate and rhythm; No murmurs, rubs, or gallops. +JVD above the mandible while sitting upright  ABDOMEN: Soft, Nontender, Nondistended; Bowel sounds present  EXTREMITIES: 1+ LE edema  PSYCH: Nl behavior, nl affect  NEUROLOGY: AAOx3, non-focal, cranial nerves intact  SKIN: Normal color, No rashes or lesions  LINES:    Cardiovascular Diagnostic Testing:    ECG: Personally reviewed   13:45. NSR at 72 BPM, new TW flattening in V1-V2 compared to EKG at 8:57 today    Echo:  TTE 2018:  ------------------------------------------------------------------------  DIMENSIONS:  Dimensions:     Normal Values:  LA:     2.9 cm    2.0 - 4.0 cm  Ao:     3.0 cm    2.0 - 3.8 cm  SEPTUM: 0.8 cm    0.6 - 1.2 cm  PWT:    0.9 cm    0.6 - 1.1 cm  LVIDd:  5.0 cm    3.0 - 5.6 cm  LVIDs:  4.1 cm    1.8 - 4.0 cm  Derived Variables:  LVMI: 96 g/m2  RWT: 0.36  Fractional short: 18 %  Ejection Fraction (Teicholtz): 37 %  ------------------------------------------------------------------------  OBSERVATIONS:  Mitral Valve: Mitral annular calcification, otherwise  normal mitralvalve. Mild mitral regurgitation.  Aortic Root: Normal aortic root.  Aortic Valve: Aortic valve leaflet morphology not well  visualized.  Left Atrium: Normal left atrium.  Left Ventricle: Endocardium not well visualized; grossly  moderate to severe global left ventricular systolic  dysfunction.  The inferolateral wall appears particularly  hypokinetic.  Endocardial visualization enhanced with  intravenous injection of echo contrast (Definity). Normal  left ventricular internal dimensions and wallthicknesses.  Right Heart: Normal right atrium. The right ventricle is  not well visualized; grossly normal right ventricular  systolic function. Normal tricuspid valve. Minimal  tricuspid regurgitation. Normal pulmonic valve.  Pericardium/PleuraNormal pericardium with no pericardial  effusion.  ------------------------------------------------------------------------  CONCLUSIONS:  1. Mitral annular calcification, otherwise normal mitral  valve. Mild mitral regurgitation.  2. Normal left ventricular internal dimensions and wall  thicknesses.  3. Endocardium not well visualized; grossly moderate to  severe global left ventricular systolic dysfunction.  The  inferolateral wall appears particularly hypokinetic.  Endocardial visualization enhanced with intravenous  injection of echo contrast (Definity).  4. The right ventricle is not well visualized; grossly  normal right ventricular systolic function.  ------------------------------------------------------------------------  Confirmed on  2018 - 16:28:43 by Jose Lucia M.D.  ------------------------------------------------------------------------        Stress Testing:    Cath:  VENTRICLES: No left ventriculogram was performed.  CORONARY VESSELS:  RI:   --  Ramus intermedius: There was a diffuse 90 % stenosis at the site  of a prior stent, . The lesion was smoothly contoured. There  was PARUL grade 3 flow through the vessel (brisk flow) and a moderate-sized  vascular territory distal to the lesion. This is a likely culprit for the  patient's clinical presentation. An intervention was performed.  COMPLICATIONS: There were no complications.  DIAGNOSTIC IMPRESSIONS: Successful PCI to severe ISR of Ramus using 3.0  Promus CORNELIA  LVEDP 32mmhG  DIAGNOSTIC RECOMMENDATIONS: Continue DAPT  Optimize heart failure therapy  INTERVENTIONAL IMPRESSIONS: Successful PCI to severe ISR of Ramus using 3.0  Promus CORNELIA  LVEDP 32mmhG  INTERVENTIONAL RECOMMENDATIONS: Continue DAPT  Optimize heart failure therapy  Prepared and signed by  Gretchen Guerra M.D.    Interpretation of Telemetry:    Imaging:    Portable CXR: 2019     INTERPRETATION:     Heart size and the mediastinum cannot be accurately evaluated on this   projection. Median sternotomy sutures, coronary artery stent, and   surgical clips are again seen. The thoracic aorta is calcified.  The right lung is clear.  Hazy left retrocardiac opacity with incomplete definition of the right   hemidiaphragm. The left upper and mid lung is clear.  No pleural effusion or pneumothorax is seen.      IMPRESSION:  Hazy left retrocardiac opacity which may be due to   subsegmental atelectasis and/or developing pneumonia.    CATRACHITA KEENAN M.D., ATTENDING RADIOLOGIST  This document has been electronically signed. 2019 11:07AM                    Labs: Personally reviewed                        10.2   10.77 )-----------( 305      ( 2019 05:45 )             34.2         143  |  106  |  52<H>  ----------------------------<  123<H>  3.4<L>   |  21<L>  |  1.91<H>    Ca    9.6      2019 05:45  Phos  3.9       Mg     2.3         TPro  8.0  /  Alb  4.3  /  TBili  0.4  /  DBili  x   /  AST  16  /  ALT  10  /  AlkPhos  73      PT/INR - ( 2019 23:32 )   PT: 12.1 SEC;   INR: 1.06          PTT - ( 2019 23:32 )  PTT:47.4 SEC  CARDIAC MARKERS ( 2019 09:23 )  x     / x     / 73 u/L / 2.98 ng/mL / x          Serum Pro-Brain Natriuretic Peptide: 1779 pg/mL ( @ 12:21)  Serum Pro-Brain Natriuretic Peptide: 2552 pg/mL ( @ 23:32)    Total Cholesterol: 127  LDL: 67  HDL: 22  T    Hemoglobin A1C, Whole Blood: 7.3 % ( @ 05:45)  Hemoglobin A1C, Whole Blood: 7.2 % ( @ 13:50)    Thyroid Stimulating Hormone, Serum: 1.59 uIU/mL ( @ 05:45) Patient seen and evaluated at bedside    Chief Complaint: SOB    HPI:  Pt is a 72 yo woman with a PMHx of CAD s/p CABG, s/p CORNELIA to R1 2018, HFrEF (EF 37%), HTN, DM, hypothyroidism, CKD,  presenting on  with  worsening SOB and ZEE. Patient usually can walk up a few steps before feeling sob, currently feeling sob after walking from living room to kitchen.  She is using 3 pillows to sleep. No cough, fevers. (+) CP, SS and constant, she experienced more ZEE for last two days.  No LE edema.     On admission, T 97.4, /52, HR 82, RR 18, satting 98% NC. BUN 58, Cr 2.21->1.91 (bl 1.5-1.8). HsTrop 65->63, CKMB 2.98, BNP 2552. EKG NSR w/ PVCs. Admitted to telemetry, tx with IV lasix.    Today, pt w/ continued CP. HsTrop 78->902. CKMB 7.66. EKG w/ new flattening of T waves in V1-V2. On my exam, vitals /54, HR 76, satting 100% on 3LNC. Appears dyspneic. Complaining of intermittent substernal CP. JVD above the mandible while sitting upright. Crackles in bilateral lung fields.       PMH:   Urinary tract infection  GERD (gastroesophageal reflux disease)  Hypertension  CAD (coronary artery disease)  Hypothyroidism  Hyperlipidemia  Diabetes mellitus, type 2  Diabetes mellitus type 2 in nonobese      PSH:   S/P CABG (coronary artery bypass graft)      Medications:   acetaminophen   Tablet .. 650 milliGRAM(s) Oral every 6 hours PRN  aspirin enteric coated 81 milliGRAM(s) Oral daily  atorvastatin 40 milliGRAM(s) Oral at bedtime  dextrose 40% Gel 15 Gram(s) Oral once PRN  dextrose 5%. 1000 milliLiter(s) IV Continuous <Continuous>  dextrose 50% Injectable 12.5 Gram(s) IV Push once  dextrose 50% Injectable 25 Gram(s) IV Push once  dextrose 50% Injectable 25 Gram(s) IV Push once  furosemide   Injectable 40 milliGRAM(s) IV Push every 12 hours  glucagon  Injectable 1 milliGRAM(s) IntraMuscular once PRN  heparin  Injectable 5000 Unit(s) SubCutaneous every 8 hours  influenza   Vaccine 0.5 milliLiter(s) IntraMuscular once  insulin glargine Injectable (LANTUS) 65 Unit(s) SubCutaneous at bedtime  insulin lispro (HumaLOG) corrective regimen sliding scale   SubCutaneous three times a day before meals  insulin lispro (HumaLOG) corrective regimen sliding scale   SubCutaneous at bedtime  insulin lispro Injectable (HumaLOG) 30 Unit(s) SubCutaneous three times a day before meals  metoprolol tartrate 25 milliGRAM(s) Oral once  ticagrelor 90 milliGRAM(s) Oral two times a day      Allergies:  No Known Allergies      FAMILY HISTORY:  Family history of hypertension (Sibling): sister  Family history of diabetes mellitus (Sibling): brothers/sisters      Social History:  Smoking History: Denies  Alcohol Use: Denies  Drug Use: Denies    Review of Systems:  REVIEW OF SYSTEMS:  CONSTITUTIONAL: No weakness, fevers or chills  EYES/ENT: No visual changes;  No dysphagia  NECK: No pain or stiffness  RESPIRATORY: +SOB, ZEE  CARDIOVASCULAR: +CP  GASTROINTESTINAL: No abdominal or epigastric pain. No nausea, vomiting, or hematemesis; No diarrhea or constipation. No melena or hematochezia.  BACK: No back pain  GENITOURINARY: No dysuria, frequency or hematuria  NEUROLOGICAL: No numbness or weakness  SKIN: No itching, burning, rashes, or lesions   All other review of systems is negative unless indicated above.    Physical Exam:  T(F): 98 (-), Max: 98.3 (-)  HR: 76 (-) (73 - 86)  BP: 101/54 (-) (100/61 - 130/70)  RR: 19 (-)  SpO2: 100% (-)  GENERAL: Mod distress  HEAD:  Atraumatic, Normocephalic  ENT: EOMI, PERRLA, conjunctiva and sclera clear, Neck supple  CHEST/LUNG: B/l crackles  HEART: Regular rate and rhythm; No murmurs, rubs, or gallops. +JVD above the mandible while sitting upright  ABDOMEN: Soft, Nontender, Nondistended; Bowel sounds present  EXTREMITIES: no LE edema  PSYCH: Nl behavior, nl affect  NEUROLOGY: AAOx3, non-focal, cranial nerves intact  SKIN: Normal color, No rashes or lesions  LINES:    Cardiovascular Diagnostic Testing:    ECG: Personally reviewed   13:45. NSR at 72 BPM, new TW flattening in V1-V2 compared to EKG at 8:57 today    TTE 2018:  ------------------------------------------------------------------------  DIMENSIONS:  Dimensions:     Normal Values:  LA:     2.9 cm    2.0 - 4.0 cm  Ao:     3.0 cm    2.0 - 3.8 cm  SEPTUM: 0.8 cm    0.6 - 1.2 cm  PWT:    0.9 cm    0.6 - 1.1 cm  LVIDd:  5.0 cm    3.0 - 5.6 cm  LVIDs:  4.1 cm    1.8 - 4.0 cm  Derived Variables:  LVMI: 96 g/m2  RWT: 0.36  Fractional short: 18 %  Ejection Fraction (Teicholtz): 37 %  ------------------------------------------------------------------------  OBSERVATIONS:  Mitral Valve: Mitral annular calcification, otherwise  normal mitral valve. Mild mitral regurgitation.  Aortic Root: Normal aortic root.  Aortic Valve: Aortic valve leaflet morphology not well  visualized.  Left Atrium: Normal left atrium.  Left Ventricle: Endocardium not well visualized; grossly  moderate to severe global left ventricular systolic  dysfunction.  The inferolateral wall appears particularly  hypokinetic.  Endocardial visualization enhanced with  intravenous injection of echo contrast (Definity). Normal  left ventricular internal dimensions and wall thicknesses.  Right Heart: Normal right atrium. The right ventricle is  not well visualized; grossly normal right ventricular  systolic function. Normal tricuspid valve. Minimal  tricuspid regurgitation. Normal pulmonic valve.  Pericardium/Pleura Normal pericardium with no pericardial  effusion.  ------------------------------------------------------------------------  CONCLUSIONS:  1. Mitral annular calcification, otherwise normal mitral  valve. Mild mitral regurgitation.  2. Normal left ventricular internal dimensions and wall  thicknesses.  3. Endocardium not well visualized; grossly moderate to  severe global left ventricular systolic dysfunction.  The  inferolateral wall appears particularly hypokinetic.  Endocardial visualization enhanced with intravenous  injection of echo contrast (Definity).  4. The right ventricle is not well visualized; grossly  normal right ventricular systolic function.  ------------------------------------------------------------------------  Confirmed on  2018 - 16:28:43 by Jose Lucia M.D.  ------------------------------------------------------------------------    Cath 2018:  VENTRICLES: No left ventriculogram was performed.  CORONARY VESSELS:  RI:   --  Ramus intermedius: There was a diffuse 90 % stenosis at the site of a prior stent, . The lesion was smoothly contoured. There was PARUL grade 3 flow through the vessel (brisk flow) and a moderate-sized vascular territory distal to the lesion. This is a likely culprit for the patient's clinical presentation. An intervention was performed.  COMPLICATIONS: There were no complications.  DIAGNOSTIC IMPRESSIONS: Successful PCI to severe ISR of Ramus using 3.0  Promus CORNELIA  LVEDP 32mmhG  DIAGNOSTIC RECOMMENDATIONS: Continue DAPT  Optimize heart failure therapy  INTERVENTIONAL IMPRESSIONS: Successful PCI to severe ISR of Ramus using 3.0  Promus CORNELIA  LVEDP 32mmhG  INTERVENTIONAL RECOMMENDATIONS: Continue DAPT  Optimize heart failure therapy  Prepared and signed by  Gretchen Guerra M.D.    [facility] 2018  CORONARY VESSELS: The coronary circulation is right dominant.  LM:   --  LM: Angiography showed mild atherosclerosis with no flow limiting  lesions.  LAD:   --  LAD: The distal vessel was supplied by extensive retrograde flow  from the graft(s) to the mid LAD.  --  Proximal LAD: There was a 90 % stenosis.  --  Mid LAD: There was a 100 % stenosis. This lesion is a chronic total  occlusion.  CX:   --  Circumflex: Angiography showed mild atherosclerosis with no flow  limiting lesions.  RI:   --  Proximal ramus intermedius: There was a diffuse 90 % stenosis at  a site with no prior intervention, at the proximal margin of the stented  segment. There was PARUL grade 3 flow through the vessel (brisk flow). This  is a likely culprit for the patient's clinical presentation.  RCA:   --  RCA: The distal vessel was supplied by collaterals from septal  branches of LAD.  --  Mid RCA: There was a 100 % stenosis. There was a moderate-sized  vascular territory distal to the lesion and good collateral blood supply  to the distal myocardium. This lesion is a chronic total occlusion.  GRAFTS:   --  Graft to the LAD: The graft was a medium sized LIMA. Graft  angiography showed no degeneration. Distal vessel angiography showed mild  diffuse disease.  COMPLICATIONS: There were no complications.  DIAGNOSTIC IMPRESSIONS: Patent GOMES to LAD  Severe stenosis of ostial Ramus (proximal stent edge)  Elevated LVEDP  DIAGNOSTIC RECOMMENDATIONS: Optimize heart failure therapy and PCI to Ramus  when patient is optimized  INTERVENTIONAL IMPRESSIONS: Patent GOMES to LAD  Severe stenosis of ostial Ramus (proximal stent edge)  Elevated LVEDP  INTERVENTIONAL RECOMMENDATIONS: Optimize heart failure therapy and PCI to  Ramus when patient is optimized  Prepared and signed by  Gretchen Guerra M.D.      Interpretation of Telemetry:    Imaging:    Portable CXR: 2019     IMPRESSION:  Hazy left retrocardiac opacity which may be due to   subsegmental atelectasis and/or developing pneumonia.    CATRACHITA KEENAN M.D., ATTENDING RADIOLOGIST  This document has been electronically signed. 2019 11:07AM                    Labs: Personally reviewed                        10.2   10.77 )-----------( 305      ( 2019 05:45 )             34.2         143  |  106  |  52<H>  ----------------------------<  123<H>  3.4<L>   |  21<L>  |  1.91<H>    Ca    9.6      2019 05:45  Phos  3.9       Mg     2.3         TPro  8.0  /  Alb  4.3  /  TBili  0.4  /  DBili  x   /  AST  16  /  ALT  10  /  AlkPhos  73      PT/INR - ( 2019 23:32 )   PT: 12.1 SEC;   INR: 1.06          PTT - ( 2019 23:32 )  PTT:47.4 SEC  CARDIAC MARKERS ( 2019 09:23 )  x     / x     / 73 u/L / 2.98 ng/mL / x          Serum Pro-Brain Natriuretic Peptide: 1779 pg/mL ( @ 12:21)  Serum Pro-Brain Natriuretic Peptide: 2552 pg/mL ( @ 23:32)    Total Cholesterol: 127  LDL: 67  HDL: 22  T    Hemoglobin A1C, Whole Blood: 7.3 % ( @ 05:45)  Hemoglobin A1C, Whole Blood: 7.2 % ( @ 13:50)    Thyroid Stimulating Hormone, Serum: 1.59 uIU/mL ( @ 05:45)

## 2019-04-25 NOTE — CONSULT NOTE ADULT - ASSESSMENT
73YOF with hx of CAD s/p CABG, hypothyroidism, CKD, CHF, HTN, DM p/w worsening ZEE and sob. Patient usually can walk up a few steps before feeling sob, currently feeling sob after walking from living room to kitchen.  She is using 3 pillows to sleep. No cough, fevers. (+) CP, SS and constant, She experienced more ZEE for last two days.  She has no edema in lower extremity.  Last admission to [facility] was 11/18. (24 Apr 2019 10:21)    ER vss.  Pt afebrile.  WBC 8.7 --> 10.7.  UA (-), RVP (-).  4/25 cxr with L hazy retrocardiac opacity.   Pt found to have NSTEMI and gross fluid overload, started on IV lasix.  She is pending LHC.        ID consult called for further abx managment and evaluation for pna.       Recommend:    - Pt with ZEE/SOB, (+) NSTEMI.  Cxr with L hazy opacity.  Pt w/o cough or fever to suggest pna on clinical basis.  Awaiting CT chest to further evaluation.  If (+) consolidations, will start abx with rocephin/azithromycin.    - f/u VQ scan, b/l LE dopplers to r/o dvt/PE    - f/u blood cultures.  Trend WBC, temp curve.     - Plan for eventual LHC when medically optimized.    Will follow,    Joselin Roman  [phone]

## 2019-04-25 NOTE — CONSULT NOTE ADULT - ASSESSMENT
73YOF w/o h/o CAD, s/p CABG CHF adm to [facility] for Acute on Chronic systolic and diastolic HF

## 2019-04-25 NOTE — CONSULT NOTE ADULT - SUBJECTIVE AND OBJECTIVE BOX
Patient is a 73y old  Female who presents with a chief complaint of sob (2019 11:09)      HPI:  73YOF with hx of CAD s/p CABG, hypothyroidism, CKD, CHF, HTN, DM p/w worsening ZEE and sob. Patient usually can walk up a few steps before feeling sob, currently feeling sob after walking from living room to kitchen.  She is using 3 pillows to sleep. No cough, fevers. (+) CP, SS and constant, She experienced more ZEE for last two days.  She has no edema in lower extremety.  Last admission to [facility] was . (2019 10:21)    the daughter at bedside says she has no underlying lung disease: Today she was found to be more SOB and hence pulmonary called: Currently sh eis on 3 LK ofo xygena dn says she hasnotbeen coughing:     ?FOLLOWING PRESENT  [x ] Hx of PE/DVT, [x ] Hx COPD, [ x] Hx of Asthma, x ] Hx of Hospitalization, [x ]  Hx of BiPAP/CPAP use, x[ ] Hx of MONA    Allergies    No Known Allergies    Intolerances        PAST MEDICAL & SURGICAL HISTORY:  GERD (gastroesophageal reflux disease)  CAD (coronary artery disease): s/p CABG  Hypothyroidism  Hyperlipidemia  Diabetes mellitus, type 2  S/P CABG (coronary artery bypass graft)      FAMILY HISTORY:  Family history of hypertension (Sibling): sister  Family history of diabetes mellitus (Sibling): brothers/sisters      Social History: [   ] TOBACCO                  [   ] ETOH                                 [   ] IVDA/DRUGS    REVIEW OF SYSTEMS      General:	fever    Skin/Breast:x  	  Ophthalmologic:x  	  ENMT:	x    Respiratory and Thorax:sob  	  Cardiovascular:	x    Gastrointestinal:	x    Genitourinary:	x    Musculoskeletal:	x    Neurological:	x    Psychiatric:	x    Hematology/Lymphatics:	x    Endocrine:	x    Allergic/Immunologic:	x    MEDICATIONS  (STANDING):  aspirin enteric coated 81 milliGRAM(s) Oral daily  atorvastatin 40 milliGRAM(s) Oral at bedtime  dextrose 5%. 1000 milliLiter(s) (50 mL/Hr) IV Continuous <Continuous>  dextrose 50% Injectable 12.5 Gram(s) IV Push once  dextrose 50% Injectable 25 Gram(s) IV Push once  dextrose 50% Injectable 25 Gram(s) IV Push once  furosemide   Injectable 40 milliGRAM(s) IV Push every 12 hours  heparin  Injectable 5000 Unit(s) SubCutaneous every 8 hours  influenza   Vaccine 0.5 milliLiter(s) IntraMuscular once  insulin glargine Injectable (LANTUS) 65 Unit(s) SubCutaneous at bedtime  insulin lispro (HumaLOG) corrective regimen sliding scale   SubCutaneous three times a day before meals  insulin lispro (HumaLOG) corrective regimen sliding scale   SubCutaneous at bedtime  insulin lispro Injectable (HumaLOG) 30 Unit(s) SubCutaneous three times a day before meals  metoprolol tartrate 25 milliGRAM(s) Oral two times a day  ticagrelor 90 milliGRAM(s) Oral two times a day    MEDICATIONS  (PRN):  acetaminophen   Tablet .. 650 milliGRAM(s) Oral every 6 hours PRN Mild Pain (1 - 3), Moderate Pain (4 - 6), Severe Pain (7 - 10)  dextrose 40% Gel 15 Gram(s) Oral once PRN Blood Glucose LESS THAN 70 milliGRAM(s)/deciliter  glucagon  Injectable 1 milliGRAM(s) IntraMuscular once PRN Glucose LESS THAN 70 milligrams/deciliter       Vital Signs Last 24 Hrs  T(C): 36.6 (2019 09:27), Max: 36.8 (2019 21:25)  T(F): 97.8 (2019 09:27), Max: 98.3 (2019 21:25)  HR: 75 (2019 09:27) (73 - 86)  BP: 100/61 (2019 09:27) (100/61 - 130/70)  BP(mean): --  RR: 19 (2019 09:27) (17 - 25)  SpO2: 100% (2019 09:27) (100% - 100%)        I&O's Summary      Physical Exam:   GENERAL: NAD, well-groomed, well-developed  HEENT: MOHSEN/   Atraumatic, Normocephalic  ENMT: No tonsillar erythema, exudates, or enlargement; Moist mucous membranes, Good dentition, No lesions  NECK: Supple, No JVD, Normal thyroid  CHEST/LUNG: Bilateral crackles: more on the rigth abse:   CVS: Regular rate and rhythm; No murmurs, rubs, or gallops  GI: : Soft, Nontender, Nondistended; Bowel sounds present  NERVOUS SYSTEM:  Alert & Oriented X3  EXTREMITIES:  2+ Peripheral Pulses, No clubbing, cyanosis, or edema  LYMPH: No lymphadenopathy noted  SKIN: No rashes or lesions  ENDOCRINOLOGY: No Thyromegaly  PSYCH: Appropriate    Labs:  ABG - ( 2019 09:30 )  pH, Arterial: 7.44  pH, Blood: x     /  pCO2: 31    /  pO2: 99    / HCO3: 23    / Base Excess: -2.7  /  SaO2: 96.8            -1.2<40<4>>26<<7.385>>-1.2<<3><<4><<5<<269>>  CARDIAC MARKERS ( 2019 09:23 )  x     / x     / 73 u/L / 2.98 ng/mL / x                                10.2   10.77 )-----------( 305      ( 2019 05:45 )             34.2                         10.2   8.77  )-----------( 285      ( 2019 23:20 )             33.6         143  |  106  |  52<H>  ----------------------------<  123<H>  3.4<L>   |  21<L>  |  1.91<H>      142  |  104  |  58<H>  ----------------------------<  185<H>  3.5   |  20<L>  |  2.21<H>    Ca    9.6      2019 05:45  Ca    9.8      2019 23:32  Phos  3.9       Mg     2.3         TPro  8.0  /  Alb  4.3  /  TBili  0.4  /  DBili  x   /  AST  16  /  ALT  10  /  AlkPhos  73      CAPILLARY BLOOD GLUCOSE      POCT Blood Glucose.: 249 mg/dL (2019 08:53)  POCT Blood Glucose.: 108 mg/dL (2019 21:31)  POCT Blood Glucose.: 212 mg/dL (2019 17:48)  POCT Blood Glucose.: 303 mg/dL (2019 15:11)    LIVER FUNCTIONS - ( 2019 23:32 )  Alb: 4.3 g/dL / Pro: 8.0 g/dL / ALK PHOS: 73 u/L / ALT: 10 u/L / AST: 16 u/L / GGT: x           PT/INR - ( 2019 23:32 )   PT: 12.1 SEC;   INR: 1.06          PTT - ( 2019 23:32 )  PTT:47.4 SEC  Urinalysis Basic - ( 2019 09:30 )    Color: LIGHT YELLOW / Appearance: CLEAR / S.011 / pH: 6.0  Gluc: 70 / Ketone: NEGATIVE  / Bili: NEGATIVE / Urobili: NORMAL   Blood: NEGATIVE / Protein: NEGATIVE / Nitrite: NEGATIVE   Leuk Esterase: NEGATIVE / RBC: x / WBC x   Sq Epi: x / Non Sq Epi: x / Bacteria: x      D DImer  D-Dimer Assay, Quantitative: 385 ng/mL ( @ 13:50)  Serum Pro-Brain Natriuretic Peptide: 1779 pg/mL ( @ 12:21)  Serum Pro-Brain Natriuretic Peptide: 2552 pg/mL ( @ 23:32)    < from: Xray Chest 1 View- PORTABLE-Urgent (19 @ 08:45) >    EXAM:  XR CHEST PORTABLE URGENT 1V        PROCEDURE DATE:  2019         INTERPRETATION:  TIME OF EXAM: 2019 at 8:25 AM.    CLINICAL INFORMATION: Shortness of breath.    COMPARISON:  2019.    TECHNIQUE:   AP Portable chestx-ray.    INTERPRETATION:     Heart size and the mediastinum cannot be accurately evaluated on this   projection. Median sternotomy sutures, coronary artery stent, and   surgical clips are again seen. The thoracic aorta is calcified.  The right lung is clear.  Hazy left retrocardiac opacity with incomplete definition of the right   hemidiaphragm. The left upper and mid lung is clear.  No pleural effusion or pneumothorax is seen.              IMPRESSION:  Hazy left retrocardiac opacity which may be due to   subsegmental atelectasis and/or developing pneumonia.        < from: CT Chest No Cont (18 @ 17:59) >  EXAM:  CT CHEST        PROCEDURE DATE:  2018         INTERPRETATION:  CLINICAL INFORMATION: Fever, shortness of breath   evaluate for pneumonia..    TECHNIQUE: CT scan of the chest was obtained without intravenous   contrast. Coronal and Sagittal reconstructions were performed.  Maximum   Intensity Projection was generated.    COMPARISON: CT chest from 2018.    FINDINGS:    AIRWAYS, LUNGS AND PLEURA: Patent central airways. Previously noted   groundglass opacities within bothlungs are no longer present. Previously   noted bilateral pleural effusions have resolved.  There is no pneumothorax.  VESSELS: Thoracic aorta is normal in caliber. The aorta and coronary   arteries are calcified.  HEART: Cardiomegaly. There is no pericardial effusion.   MEDIASTINUM: No significant mediastinal or hilar adenopathy is seen.   NECK AND CHEST WALL: The visualized thyroid is homogeneous. There is no   significant supraclavicular or axillary lymphadenopathy.  UPPER ABDOMEN: The visualized upper abdomen is unremarkable.  BONES: Status post median sternotomy.    IMPRESSION:  Resolution of the previously noted bilateral pleural effusions and   groundglass opacities within both lungs when compared to previous exam.              KENDALL GRANT M.D., RADIOLOGY RESIDENT  This document has been electronically signed.  IRVING MERCEDES M.D., ATTENDING RADIOLOGIST  This document has been electronically signed. 2018 12:23PM        < end of copied text >            CATRACHITA KEENAN M.D., ATTENDING RADIOLOGIST  This document has been electronically signed. 2019 11:07AM        < end of copied text >    Studies  Chest X-RAY  CT SCAN Chest   CT Abdomen  Venous Dopplers: LE:   Others

## 2019-04-25 NOTE — DIETITIAN INITIAL EVALUATION ADULT. - PERTINENT MEDS FT
MEDICATIONS  (STANDING):  aspirin enteric coated 81 milliGRAM(s) Oral daily  atorvastatin 40 milliGRAM(s) Oral at bedtime  dextrose 5%. 1000 milliLiter(s) (50 mL/Hr) IV Continuous <Continuous>  dextrose 50% Injectable 12.5 Gram(s) IV Push once  dextrose 50% Injectable 25 Gram(s) IV Push once  dextrose 50% Injectable 25 Gram(s) IV Push once  furosemide   Injectable 40 milliGRAM(s) IV Push every 12 hours  heparin  Injectable 5000 Unit(s) SubCutaneous every 8 hours  influenza   Vaccine 0.5 milliLiter(s) IntraMuscular once  insulin glargine Injectable (LANTUS) 65 Unit(s) SubCutaneous at bedtime  insulin lispro (HumaLOG) corrective regimen sliding scale   SubCutaneous three times a day before meals  insulin lispro (HumaLOG) corrective regimen sliding scale   SubCutaneous at bedtime  insulin lispro Injectable (HumaLOG) 30 Unit(s) SubCutaneous three times a day before meals  metoprolol tartrate 25 milliGRAM(s) Oral two times a day  ticagrelor 90 milliGRAM(s) Oral two times a day    MEDICATIONS  (PRN):  acetaminophen   Tablet .. 650 milliGRAM(s) Oral every 6 hours PRN Mild Pain (1 - 3), Moderate Pain (4 - 6), Severe Pain (7 - 10)  dextrose 40% Gel 15 Gram(s) Oral once PRN Blood Glucose LESS THAN 70 milliGRAM(s)/deciliter  glucagon  Injectable 1 milliGRAM(s) IntraMuscular once PRN Glucose LESS THAN 70 milligrams/deciliter

## 2019-04-25 NOTE — DIETITIAN INITIAL EVALUATION ADULT. - OTHER INFO
Patient seen for nutrition consult for Registered Dietitian. Per chart: Patient with history of CAD s/p CABG, hypothyroidism, CKD, CHF, HTN, DM. Per daughter in law- patient consuming about 25% of meals in house- still does not have much of an appetite. Patient denies any nausea/vomiting/diarrhea/constipation or difficulty chewing and swallowing. Patient reports no food allergies or intolerances. Denies any recent weight change- reported usual weight 125-130 pounds. Current weight: 132 pounds. No edema noted in chart.

## 2019-04-25 NOTE — CHART NOTE - NSCHARTNOTEFT_GEN_A_CORE
Notified by floor PA - patient noted to have increased troponin from 78 -> 902. No changes in recurrent chest pain patient had this morning. Stat EKG repeated - no acute SHANELLE noted. CK rechecked at 96. VS stable. Discussed with attending - Additional lopressor 25mg given, and increased to 50mg BID. Attending Dr. Ga notified patient's cardiologist Dr. Cotto and house cardiology fellow to assume care. Will sign off at this time.    Amauri Hill PA-C  [facility]

## 2019-04-25 NOTE — DIETITIAN INITIAL EVALUATION ADULT. - ADHERENCE
Limits sodium intake and follows a 1 liter fluid restriction. Patient noted with history of DM- monitors BGs 1 to 2 times a day- before meals. 4/25- HBa1c: 7.3%

## 2019-04-25 NOTE — PROGRESS NOTE ADULT - ASSESSMENT
73YOF with hx of CAD s/p CABG, hypothyroidism, CKD, CHF, HTN, DM p/w worsening ZEE and sob.    A/p  MERVIN  scr on admission 2.21  possible renal vasal congestion improved today  diuresing per cardio  monitor bmp  monitor daily weight and i/o  avoid nephrotoxic agents    CKD stage 3  baseline cr 1.5-1.8  likely sec to HTN/DM/chf  elevated PTH, r/o vit d def check vit d 25  phos optimal    CHF  f/u cardio 73YOF with hx of CAD s/p CABG, hypothyroidism, CKD, CHF, HTN, DM p/w worsening ZEE and sob.    A/p  MERVIN  scr on admission 2.21  MERVIN Likely sec to  renal vasal congestion  Renal function improved today  Diuretics  per cardio  monitor bmp  avoid nephrotoxic agents  **If pt planned for cardiac cath , pt is 26% risk of developing NGHIA and 1.09% risk of requiring RRT. In view of fluid overload status, no IVF prior to cath. Recommend to hold morning dose of lasix on the day of procedure    CKD stage 3  baseline cr 1.5-1.8  likely sec to HTN/DM/chf  elevated PTH, r/o vit d def check vit d 25  phos optimal    CHF  monitor daily weight and i/o  diuretics per cardio  f/u cardio

## 2019-04-25 NOTE — PHYSICAL THERAPY INITIAL EVALUATION ADULT - GAIT DEVIATIONS NOTED, PT EVAL
decreased sussy/decreased velocity of limb motion/decreased stride length/decreased weight-shifting ability

## 2019-04-25 NOTE — DIETITIAN INITIAL EVALUATION ADULT. - PERTINENT LABORATORY DATA
04-25 Na143 mmol/L Glu 123 mg/dL<H> K+ 3.4 mmol/L<L> Cr  1.91 mg/dL<H> BUN 52 mg/dL<H> 04-25 Phos 3.9 mg/dL 04-23 Alb 4.3 g/dL 04-25 [id number] 7.3 %<H> 04-24 Chol 127 mg/dL LDL 67 mg/dL HDL 22 mg/dL<L> Trig 258 mg/dL<H>. POCT:  249,108,212, 303 mg/dL

## 2019-04-25 NOTE — CONSULT NOTE ADULT - ASSESSMENT
73YOF w/o h/o CAD, s/p CABG Systolic CHF, CKD 3b, who p/w sob and pino. Admitted for:    - Acute on Chronic Systolic CHF Exacerbation:      - diuresis      - cardiac meds       - f/u ct chest to r/o infectious process  - NSTEMI:      - uptrending cardiac enzymes      - heparin gtt, brilinta, asa      - cardiac meds      - optimize renal function, likely eventual cardiac cath      - adjust management per cards - appreciated      - tele  - CAD:      - asa, statin, bb   - Acute Kidney Injury:      - on CKD 3      - optimize co-morbidities      - trend renal function      - avoid nephrotoxic rx   - Uncontrolled DM II with long term complications of hyperglycemia and CKD:      - c/w dm rx, dm education, monitor acks

## 2019-04-25 NOTE — DIETITIAN INITIAL EVALUATION ADULT. - ENERGY NEEDS
Ht: 54 inches noted in chart. Daughter in law reported 60 inches  Wt: 132 pounds BMI: 25.7 kg/m2 IBW: 100 pounds (+/-10%) %IBW: 132%  Edema: no edema noted.  Skin: intact, no pressure injuries noted

## 2019-04-25 NOTE — CONSULT NOTE ADULT - SUBJECTIVE AND OBJECTIVE BOX
Patient seen and examined at bedside  Case discussed with medical team    HPI:  73YOF with hx of CAD s/p CABG, hypothyroidism, CKD, CHF, HTN, DM p/w worsening ZEE and sob. Patient usually can walk up a few steps before feeling sob, currently feeling sob after walking from living room to kitchen.  She is using 3 pillows to sleep. No cough, fevers. (+) CP, SS and constant, She experienced more ZEE for last two days.  She has no edema in lower extremety.  Last admission to [facility] was . (2019 10:21)      PAST MEDICAL & SURGICAL HISTORY:  GERD (gastroesophageal reflux disease)  CAD (coronary artery disease): s/p CABG  Hypothyroidism  Hyperlipidemia  Diabetes mellitus, type 2  S/P CABG (coronary artery bypass graft)      No Known Allergies       MEDICATIONS  (STANDING):  aspirin 244 milliGRAM(s) Oral daily  aspirin enteric coated 81 milliGRAM(s) Oral daily  atorvastatin 40 milliGRAM(s) Oral at bedtime  dextrose 5%. 1000 milliLiter(s) (50 mL/Hr) IV Continuous <Continuous>  dextrose 50% Injectable 12.5 Gram(s) IV Push once  dextrose 50% Injectable 25 Gram(s) IV Push once  dextrose 50% Injectable 25 Gram(s) IV Push once  furosemide   Injectable 80 milliGRAM(s) IV Push every 12 hours  heparin  Infusion.  Unit(s)/Hr (7 mL/Hr) IV Continuous <Continuous>  heparin  Injectable 3500 Unit(s) IV Push once  heparin  Injectable 5000 Unit(s) SubCutaneous every 8 hours  influenza   Vaccine 0.5 milliLiter(s) IntraMuscular once  insulin glargine Injectable (LANTUS) 65 Unit(s) SubCutaneous at bedtime  insulin lispro (HumaLOG) corrective regimen sliding scale   SubCutaneous three times a day before meals  insulin lispro (HumaLOG) corrective regimen sliding scale   SubCutaneous at bedtime  insulin lispro Injectable (HumaLOG) 30 Unit(s) SubCutaneous three times a day before meals  metoprolol tartrate 50 milliGRAM(s) Oral two times a day  ticagrelor 90 milliGRAM(s) Oral once  ticagrelor 90 milliGRAM(s) Oral two times a day    MEDICATIONS  (PRN):  acetaminophen   Tablet .. 650 milliGRAM(s) Oral every 6 hours PRN Mild Pain (1 - 3), Moderate Pain (4 - 6), Severe Pain (7 - 10)  dextrose 40% Gel 15 Gram(s) Oral once PRN Blood Glucose LESS THAN 70 milliGRAM(s)/deciliter  glucagon  Injectable 1 milliGRAM(s) IntraMuscular once PRN Glucose LESS THAN 70 milligrams/deciliter  heparin  Injectable 3500 Unit(s) IV Push every 6 hours PRN For aPTT less than 40      REVIEW OF SYSTEMS:  CONSTITUTIONAL: (+) malaise.   EYES: No acute change in vision   ENT:  No tinnitus  NECK: No stiffness  RESPIRATORY: sob. zee. No hemoptysis  CARDIOVASCULAR: decreased exercise tolerance. No chest pain, palpitations, syncope  GASTROINTESTINAL: No hematemesis, diarrhea, melena, or hematochezia.  GENITOURINARY: No hematuria  NEUROLOGICAL: No headaches  LYMPH Nodes: No enlarged glands  ENDOCRINE: No heat or cold intolerance	    T(C): 36.7 (19 @ 13:33), Max: 36.8 (19 @ 21:25)  HR: 76 (19 @ 13:33) (73 - 86)  BP: 101/54 (19 @ 13:33) (100/61 - 130/70)  RR: 19 (19 @ 13:33) (17 - 25)  SpO2: 100% (19 @ 13:33) (100% - 100%)    PHYSICAL EXAMINATION:   Constitutional: NAD  HEENT: NC, AT  Neck:  Supple  Respiratory:  rales. Adequate airflow b/l. Not using accessory muscles of respiration.  Cardiovascular:  systolic murmur, S1 & S2 intact, no R/G, 2+ radial pulses b/l  Gastrointestinal: Soft, NT, ND, normoactive b.s., no organomegaly/RT/rigidity  Extremities: WWP  Neurological:  Alert and awake.  No acute focal motor deficits. Crude sensation intact.     Labs and imaging reviewed    LABS:                        10.2   10 )-----------( 305      ( 2019 05:45 )             34.2         143  |  106  |  52<H>  ----------------------------<  123<H>  3.4<L>   |  21<L>  |  1.91<H>    Ca    9.6      2019 05:45  Phos  3.9     04-  Mg     2.3     04-25    TPro  8.0  /  Alb  4.3  /  TBili  0.4  /  DBili  x   /  AST  16  /  ALT  10  /  AlkPhos  73  -23    CARDIAC MARKERS ( 2019 12:01 )  x     / x     / 96 u/L / 7.66 ng/mL / x      CARDIAC MARKERS ( 2019 09:23 )  x     / x     / 73 u/L / 2.98 ng/mL / x          PT/INR - ( 2019 23:32 )   PT: 12.1 SEC;   INR: 1.06          PTT - ( 2019 23:32 )  PTT:47.4 SEC  Urinalysis Basic - ( 2019 09:30 )    Color: LIGHT YELLOW / Appearance: CLEAR / S.011 / pH: 6.0  Gluc: 70 / Ketone: NEGATIVE  / Bili: NEGATIVE / Urobili: NORMAL   Blood: NEGATIVE / Protein: NEGATIVE / Nitrite: NEGATIVE   Leuk Esterase: NEGATIVE / RBC: x / WBC x   Sq Epi: x / Non Sq Epi: x / Bacteria: x      CAPILLARY BLOOD GLUCOSE      POCT Blood Glucose.: 181 mg/dL (2019 12:47)  POCT Blood Glucose.: 249 mg/dL (2019 08:53)  POCT Blood Glucose.: 108 mg/dL (2019 21:31)  POCT Blood Glucose.: 212 mg/dL (2019 17:48)  POCT Blood Glucose.: 303 mg/dL (2019 15:11)        LIVER FUNCTIONS - ( 2019 23:32 )  Alb: 4.3 g/dL / Pro: 8.0 g/dL / ALK PHOS: 73 u/L / ALT: 10 u/L / AST: 16 u/L / GGT: x           ABG - ( 2019 09:30 )  pH, Arterial: 7.44  pH, Blood: x     /  pCO2: 31    /  pO2: 99    / HCO3: 23    / Base Excess: -2.7  /  SaO2: 96.8                RADIOLOGY & ADDITIONAL STUDIES:

## 2019-04-25 NOTE — DIETITIAN INITIAL EVALUATION ADULT. - NS AS NUTRI INTERV ED CONTENT
Reviewed DM diet education; including, carb counting, label reading, meal planning, pre-prandial and post-prandial finger stick goals, and HbA1c goal. Patient was also made aware of the physiological implications of poor glycemic control.  The Patient was provided with Heart Healthy diet education (low sodium, low cholesterol, "good fats" vs "bad fats," fiber, label reading, meal planning, and daily weight monitoring).

## 2019-04-26 DIAGNOSIS — Z29.9 ENCOUNTER FOR PROPHYLACTIC MEASURES, UNSPECIFIED: ICD-10-CM

## 2019-04-26 DIAGNOSIS — N17.9 ACUTE KIDNEY FAILURE, UNSPECIFIED: ICD-10-CM

## 2019-04-26 DIAGNOSIS — I50.23 ACUTE ON CHRONIC SYSTOLIC (CONGESTIVE) HEART FAILURE: ICD-10-CM

## 2019-04-26 DIAGNOSIS — E03.9 HYPOTHYROIDISM, UNSPECIFIED: ICD-10-CM

## 2019-04-26 DIAGNOSIS — R74.8 ABNORMAL LEVELS OF OTHER SERUM ENZYMES: ICD-10-CM

## 2019-04-26 DIAGNOSIS — E11.9 TYPE 2 DIABETES MELLITUS WITHOUT COMPLICATIONS: ICD-10-CM

## 2019-04-26 LAB
ALBUMIN SERPL ELPH-MCNC: 3.7 G/DL — SIGNIFICANT CHANGE UP (ref 3.3–5)
ALBUMIN SERPL ELPH-MCNC: 3.9 G/DL — SIGNIFICANT CHANGE UP (ref 3.3–5)
ALP SERPL-CCNC: 65 U/L — SIGNIFICANT CHANGE UP (ref 40–120)
ALP SERPL-CCNC: 66 U/L — SIGNIFICANT CHANGE UP (ref 40–120)
ALT FLD-CCNC: 11 U/L — SIGNIFICANT CHANGE UP (ref 4–33)
ALT FLD-CCNC: 9 U/L — SIGNIFICANT CHANGE UP (ref 4–33)
ANION GAP SERPL CALC-SCNC: 18 MMO/L — HIGH (ref 7–14)
ANION GAP SERPL CALC-SCNC: 18 MMO/L — HIGH (ref 7–14)
ANION GAP SERPL CALC-SCNC: 20 MMO/L — HIGH (ref 7–14)
APTT BLD: 34.2 SEC — SIGNIFICANT CHANGE UP (ref 27.5–36.3)
APTT BLD: 79.7 SEC — HIGH (ref 27.5–36.3)
AST SERPL-CCNC: 16 U/L — SIGNIFICANT CHANGE UP (ref 4–32)
AST SERPL-CCNC: 17 U/L — SIGNIFICANT CHANGE UP (ref 4–32)
BASE EXCESS BLDA CALC-SCNC: -3.6 MMOL/L — SIGNIFICANT CHANGE UP
BASOPHILS # BLD AUTO: 0.03 K/UL — SIGNIFICANT CHANGE UP (ref 0–0.2)
BASOPHILS # BLD AUTO: 0.03 K/UL — SIGNIFICANT CHANGE UP (ref 0–0.2)
BASOPHILS NFR BLD AUTO: 0.2 % — SIGNIFICANT CHANGE UP (ref 0–2)
BASOPHILS NFR BLD AUTO: 0.3 % — SIGNIFICANT CHANGE UP (ref 0–2)
BILIRUB SERPL-MCNC: 0.3 MG/DL — SIGNIFICANT CHANGE UP (ref 0.2–1.2)
BILIRUB SERPL-MCNC: 0.3 MG/DL — SIGNIFICANT CHANGE UP (ref 0.2–1.2)
BLD GP AB SCN SERPL QL: NEGATIVE — SIGNIFICANT CHANGE UP
BUN SERPL-MCNC: 55 MG/DL — HIGH (ref 7–23)
BUN SERPL-MCNC: 57 MG/DL — HIGH (ref 7–23)
BUN SERPL-MCNC: 60 MG/DL — HIGH (ref 7–23)
CALCIUM SERPL-MCNC: 9 MG/DL — SIGNIFICANT CHANGE UP (ref 8.4–10.5)
CALCIUM SERPL-MCNC: 9.2 MG/DL — SIGNIFICANT CHANGE UP (ref 8.4–10.5)
CALCIUM SERPL-MCNC: 9.3 MG/DL — SIGNIFICANT CHANGE UP (ref 8.4–10.5)
CHLORIDE SERPL-SCNC: 102 MMOL/L — SIGNIFICANT CHANGE UP (ref 98–107)
CHLORIDE SERPL-SCNC: 106 MMOL/L — SIGNIFICANT CHANGE UP (ref 98–107)
CHLORIDE SERPL-SCNC: 99 MMOL/L — SIGNIFICANT CHANGE UP (ref 98–107)
CK MB BLD-MCNC: 2.53 NG/ML — SIGNIFICANT CHANGE UP (ref 1–4.7)
CK MB BLD-MCNC: 2.54 NG/ML — SIGNIFICANT CHANGE UP (ref 1–4.7)
CK MB BLD-MCNC: SIGNIFICANT CHANGE UP (ref 0–2.5)
CK MB BLD-MCNC: SIGNIFICANT CHANGE UP (ref 0–2.5)
CK SERPL-CCNC: 76 U/L — SIGNIFICANT CHANGE UP (ref 25–170)
CK SERPL-CCNC: 82 U/L — SIGNIFICANT CHANGE UP (ref 25–170)
CO2 SERPL-SCNC: 16 MMOL/L — LOW (ref 22–31)
CO2 SERPL-SCNC: 19 MMOL/L — LOW (ref 22–31)
CO2 SERPL-SCNC: 19 MMOL/L — LOW (ref 22–31)
CREAT SERPL-MCNC: 1.89 MG/DL — HIGH (ref 0.5–1.3)
CREAT SERPL-MCNC: 1.92 MG/DL — HIGH (ref 0.5–1.3)
CREAT SERPL-MCNC: 2.06 MG/DL — HIGH (ref 0.5–1.3)
EOSINOPHIL # BLD AUTO: 0.17 K/UL — SIGNIFICANT CHANGE UP (ref 0–0.5)
EOSINOPHIL # BLD AUTO: 0.21 K/UL — SIGNIFICANT CHANGE UP (ref 0–0.5)
EOSINOPHIL NFR BLD AUTO: 1.6 % — SIGNIFICANT CHANGE UP (ref 0–6)
EOSINOPHIL NFR BLD AUTO: 1.7 % — SIGNIFICANT CHANGE UP (ref 0–6)
GLUCOSE BLDA-MCNC: 155 MG/DL — HIGH (ref 70–99)
GLUCOSE SERPL-MCNC: 155 MG/DL — HIGH (ref 70–99)
GLUCOSE SERPL-MCNC: 266 MG/DL — HIGH (ref 70–99)
GLUCOSE SERPL-MCNC: 98 MG/DL — SIGNIFICANT CHANGE UP (ref 70–99)
HCO3 BLDA-SCNC: 22 MMOL/L — SIGNIFICANT CHANGE UP (ref 22–26)
HCT VFR BLD CALC: 29 % — LOW (ref 34.5–45)
HCT VFR BLD CALC: 33.4 % — LOW (ref 34.5–45)
HCT VFR BLD CALC: 35.1 % — SIGNIFICANT CHANGE UP (ref 34.5–45)
HCT VFR BLDA CALC: 28 % — LOW (ref 34.5–46.5)
HGB BLD-MCNC: 10.1 G/DL — LOW (ref 11.5–15.5)
HGB BLD-MCNC: 10.6 G/DL — LOW (ref 11.5–15.5)
HGB BLD-MCNC: 8.4 G/DL — LOW (ref 11.5–15.5)
HGB BLDA-MCNC: 9 G/DL — LOW (ref 11.5–15.5)
IMM GRANULOCYTES NFR BLD AUTO: 0.7 % — SIGNIFICANT CHANGE UP (ref 0–1.5)
IMM GRANULOCYTES NFR BLD AUTO: 0.8 % — SIGNIFICANT CHANGE UP (ref 0–1.5)
INR BLD: 1.08 — SIGNIFICANT CHANGE UP (ref 0.88–1.17)
LACTATE SERPL-SCNC: 1.5 MMOL/L — SIGNIFICANT CHANGE UP (ref 0.5–2)
LYMPHOCYTES # BLD AUTO: 1.05 K/UL — SIGNIFICANT CHANGE UP (ref 1–3.3)
LYMPHOCYTES # BLD AUTO: 1.88 K/UL — SIGNIFICANT CHANGE UP (ref 1–3.3)
LYMPHOCYTES # BLD AUTO: 15.3 % — SIGNIFICANT CHANGE UP (ref 13–44)
LYMPHOCYTES # BLD AUTO: 9.9 % — LOW (ref 13–44)
MAGNESIUM SERPL-MCNC: 2.1 MG/DL — SIGNIFICANT CHANGE UP (ref 1.6–2.6)
MAGNESIUM SERPL-MCNC: 2.2 MG/DL — SIGNIFICANT CHANGE UP (ref 1.6–2.6)
MAGNESIUM SERPL-MCNC: 2.3 MG/DL — SIGNIFICANT CHANGE UP (ref 1.6–2.6)
MCHC RBC-ENTMCNC: 28.5 PG — SIGNIFICANT CHANGE UP (ref 27–34)
MCHC RBC-ENTMCNC: 28.7 PG — SIGNIFICANT CHANGE UP (ref 27–34)
MCHC RBC-ENTMCNC: 28.9 PG — SIGNIFICANT CHANGE UP (ref 27–34)
MCHC RBC-ENTMCNC: 29 % — LOW (ref 32–36)
MCHC RBC-ENTMCNC: 30.2 % — LOW (ref 32–36)
MCHC RBC-ENTMCNC: 30.2 % — LOW (ref 32–36)
MCV RBC AUTO: 95.1 FL — SIGNIFICANT CHANGE UP (ref 80–100)
MCV RBC AUTO: 95.7 FL — SIGNIFICANT CHANGE UP (ref 80–100)
MCV RBC AUTO: 98.3 FL — SIGNIFICANT CHANGE UP (ref 80–100)
MONOCYTES # BLD AUTO: 0.76 K/UL — SIGNIFICANT CHANGE UP (ref 0–0.9)
MONOCYTES # BLD AUTO: 0.81 K/UL — SIGNIFICANT CHANGE UP (ref 0–0.9)
MONOCYTES NFR BLD AUTO: 6.6 % — SIGNIFICANT CHANGE UP (ref 2–14)
MONOCYTES NFR BLD AUTO: 7.2 % — SIGNIFICANT CHANGE UP (ref 2–14)
NEUTROPHILS # BLD AUTO: 8.52 K/UL — HIGH (ref 1.8–7.4)
NEUTROPHILS # BLD AUTO: 9.25 K/UL — HIGH (ref 1.8–7.4)
NEUTROPHILS NFR BLD AUTO: 75.5 % — SIGNIFICANT CHANGE UP (ref 43–77)
NEUTROPHILS NFR BLD AUTO: 80.2 % — HIGH (ref 43–77)
NRBC # FLD: 0 K/UL — SIGNIFICANT CHANGE UP (ref 0–0)
NRBC # FLD: 0 K/UL — SIGNIFICANT CHANGE UP (ref 0–0)
NRBC # FLD: 0.02 K/UL — SIGNIFICANT CHANGE UP (ref 0–0)
PCO2 BLDA: 30 MMHG — LOW (ref 32–48)
PH BLDA: 7.44 PH — SIGNIFICANT CHANGE UP (ref 7.35–7.45)
PHOSPHATE SERPL-MCNC: 3.1 MG/DL — SIGNIFICANT CHANGE UP (ref 2.5–4.5)
PHOSPHATE SERPL-MCNC: 3.6 MG/DL — SIGNIFICANT CHANGE UP (ref 2.5–4.5)
PLATELET # BLD AUTO: 246 K/UL — SIGNIFICANT CHANGE UP (ref 150–400)
PLATELET # BLD AUTO: 282 K/UL — SIGNIFICANT CHANGE UP (ref 150–400)
PLATELET # BLD AUTO: 331 K/UL — SIGNIFICANT CHANGE UP (ref 150–400)
PMV BLD: 9.2 FL — SIGNIFICANT CHANGE UP (ref 7–13)
PMV BLD: 9.3 FL — SIGNIFICANT CHANGE UP (ref 7–13)
PMV BLD: 9.6 FL — SIGNIFICANT CHANGE UP (ref 7–13)
PO2 BLDA: 125 MMHG — HIGH (ref 83–108)
POTASSIUM BLDA-SCNC: 3.3 MMOL/L — LOW (ref 3.4–4.5)
POTASSIUM SERPL-MCNC: 3.9 MMOL/L — SIGNIFICANT CHANGE UP (ref 3.5–5.3)
POTASSIUM SERPL-SCNC: 3.9 MMOL/L — SIGNIFICANT CHANGE UP (ref 3.5–5.3)
PROCALCITONIN SERPL-MCNC: 0.13 NG/ML — HIGH (ref 0.02–0.1)
PROT SERPL-MCNC: 7.6 G/DL — SIGNIFICANT CHANGE UP (ref 6–8.3)
PROT SERPL-MCNC: 7.6 G/DL — SIGNIFICANT CHANGE UP (ref 6–8.3)
PROTHROM AB SERPL-ACNC: 12.4 SEC — SIGNIFICANT CHANGE UP (ref 9.8–13.1)
RBC # BLD: 2.95 M/UL — LOW (ref 3.8–5.2)
RBC # BLD: 3.49 M/UL — LOW (ref 3.8–5.2)
RBC # BLD: 3.69 M/UL — LOW (ref 3.8–5.2)
RBC # FLD: 17.1 % — HIGH (ref 10.3–14.5)
RBC # FLD: 17.1 % — HIGH (ref 10.3–14.5)
RBC # FLD: 17.5 % — HIGH (ref 10.3–14.5)
RH IG SCN BLD-IMP: POSITIVE — SIGNIFICANT CHANGE UP
SAO2 % BLDA: 97.5 % — SIGNIFICANT CHANGE UP (ref 95–99)
SODIUM BLDA-SCNC: 136 MMOL/L — SIGNIFICANT CHANGE UP (ref 136–146)
SODIUM SERPL-SCNC: 136 MMOL/L — SIGNIFICANT CHANGE UP (ref 135–145)
SODIUM SERPL-SCNC: 139 MMOL/L — SIGNIFICANT CHANGE UP (ref 135–145)
SODIUM SERPL-SCNC: 142 MMOL/L — SIGNIFICANT CHANGE UP (ref 135–145)
SPECIMEN SOURCE: SIGNIFICANT CHANGE UP
SPECIMEN SOURCE: SIGNIFICANT CHANGE UP
TROPONIN T, HIGH SENSITIVITY: 81 NG/L — CRITICAL HIGH (ref ?–14)
TROPONIN T, HIGH SENSITIVITY: 83 NG/L — CRITICAL HIGH (ref ?–14)
WBC # BLD: 10.62 K/UL — HIGH (ref 3.8–10.5)
WBC # BLD: 12.27 K/UL — HIGH (ref 3.8–10.5)
WBC # BLD: 14.28 K/UL — HIGH (ref 3.8–10.5)
WBC # FLD AUTO: 10.62 K/UL — HIGH (ref 3.8–10.5)
WBC # FLD AUTO: 12.27 K/UL — HIGH (ref 3.8–10.5)
WBC # FLD AUTO: 14.28 K/UL — HIGH (ref 3.8–10.5)

## 2019-04-26 PROCEDURE — 93970 EXTREMITY STUDY: CPT | Mod: 26

## 2019-04-26 PROCEDURE — 93306 TTE W/DOPPLER COMPLETE: CPT | Mod: 26

## 2019-04-26 PROCEDURE — 99233 SBSQ HOSP IP/OBS HIGH 50: CPT | Mod: GC

## 2019-04-26 PROCEDURE — 99232 SBSQ HOSP IP/OBS MODERATE 35: CPT | Mod: GC

## 2019-04-26 PROCEDURE — 93010 ELECTROCARDIOGRAM REPORT: CPT | Mod: 76

## 2019-04-26 PROCEDURE — 99223 1ST HOSP IP/OBS HIGH 75: CPT

## 2019-04-26 RX ORDER — ONDANSETRON 8 MG/1
4 TABLET, FILM COATED ORAL ONCE
Qty: 0 | Refills: 0 | Status: DISCONTINUED | OUTPATIENT
Start: 2019-04-26 | End: 2019-05-09

## 2019-04-26 RX ORDER — LEVOTHYROXINE SODIUM 125 MCG
50 TABLET ORAL DAILY
Qty: 0 | Refills: 0 | Status: DISCONTINUED | OUTPATIENT
Start: 2019-04-26 | End: 2019-05-16

## 2019-04-26 RX ORDER — DIPHENHYDRAMINE HCL 50 MG
25 CAPSULE ORAL EVERY 4 HOURS
Qty: 0 | Refills: 0 | Status: DISCONTINUED | OUTPATIENT
Start: 2019-04-26 | End: 2019-05-09

## 2019-04-26 RX ORDER — HEPARIN SODIUM 5000 [USP'U]/ML
5000 INJECTION INTRAVENOUS; SUBCUTANEOUS EVERY 8 HOURS
Qty: 0 | Refills: 0 | Status: DISCONTINUED | OUTPATIENT
Start: 2019-04-26 | End: 2019-05-09

## 2019-04-26 RX ORDER — AZITHROMYCIN 500 MG/1
500 TABLET, FILM COATED ORAL ONCE
Qty: 0 | Refills: 0 | Status: COMPLETED | OUTPATIENT
Start: 2019-04-26 | End: 2019-04-26

## 2019-04-26 RX ORDER — AZITHROMYCIN 500 MG/1
TABLET, FILM COATED ORAL
Qty: 0 | Refills: 0 | Status: DISCONTINUED | OUTPATIENT
Start: 2019-04-26 | End: 2019-04-26

## 2019-04-26 RX ORDER — CEFTRIAXONE 500 MG/1
INJECTION, POWDER, FOR SOLUTION INTRAMUSCULAR; INTRAVENOUS
Qty: 0 | Refills: 0 | Status: DISCONTINUED | OUTPATIENT
Start: 2019-04-26 | End: 2019-04-26

## 2019-04-26 RX ORDER — FUROSEMIDE 40 MG
40 TABLET ORAL ONCE
Qty: 0 | Refills: 0 | Status: COMPLETED | OUTPATIENT
Start: 2019-04-26 | End: 2019-04-26

## 2019-04-26 RX ORDER — FUROSEMIDE 40 MG
60 TABLET ORAL ONCE
Qty: 0 | Refills: 0 | Status: COMPLETED | OUTPATIENT
Start: 2019-04-26 | End: 2019-04-26

## 2019-04-26 RX ORDER — HYDRALAZINE HCL 50 MG
5 TABLET ORAL EVERY 8 HOURS
Qty: 0 | Refills: 0 | Status: DISCONTINUED | OUTPATIENT
Start: 2019-04-26 | End: 2019-04-27

## 2019-04-26 RX ORDER — CEFTRIAXONE 500 MG/1
1 INJECTION, POWDER, FOR SOLUTION INTRAMUSCULAR; INTRAVENOUS ONCE
Qty: 0 | Refills: 0 | Status: COMPLETED | OUTPATIENT
Start: 2019-04-26 | End: 2019-04-26

## 2019-04-26 RX ORDER — CHLORHEXIDINE GLUCONATE 213 G/1000ML
1 SOLUTION TOPICAL
Qty: 0 | Refills: 0 | Status: DISCONTINUED | OUTPATIENT
Start: 2019-04-26 | End: 2019-05-16

## 2019-04-26 RX ORDER — SODIUM CHLORIDE 0.65 %
1 AEROSOL, SPRAY (ML) NASAL THREE TIMES A DAY
Qty: 0 | Refills: 0 | Status: DISCONTINUED | OUTPATIENT
Start: 2019-04-26 | End: 2019-05-16

## 2019-04-26 RX ORDER — IPRATROPIUM/ALBUTEROL SULFATE 18-103MCG
3 AEROSOL WITH ADAPTER (GRAM) INHALATION ONCE
Qty: 0 | Refills: 0 | Status: DISCONTINUED | OUTPATIENT
Start: 2019-04-26 | End: 2019-05-09

## 2019-04-26 RX ORDER — IPRATROPIUM/ALBUTEROL SULFATE 18-103MCG
3 AEROSOL WITH ADAPTER (GRAM) INHALATION EVERY 6 HOURS
Qty: 0 | Refills: 0 | Status: DISCONTINUED | OUTPATIENT
Start: 2019-04-26 | End: 2019-05-11

## 2019-04-26 RX ADMIN — TICAGRELOR 90 MILLIGRAM(S): 90 TABLET ORAL at 05:18

## 2019-04-26 RX ADMIN — Medication 3 MILLILITER(S): at 22:26

## 2019-04-26 RX ADMIN — ATORVASTATIN CALCIUM 40 MILLIGRAM(S): 80 TABLET, FILM COATED ORAL at 22:21

## 2019-04-26 RX ADMIN — Medication 81 MILLIGRAM(S): at 11:48

## 2019-04-26 RX ADMIN — CEFTRIAXONE 100 GRAM(S): 500 INJECTION, POWDER, FOR SOLUTION INTRAMUSCULAR; INTRAVENOUS at 11:10

## 2019-04-26 RX ADMIN — HEPARIN SODIUM 450 UNIT(S)/HR: 5000 INJECTION INTRAVENOUS; SUBCUTANEOUS at 07:33

## 2019-04-26 RX ADMIN — AZITHROMYCIN 250 MILLIGRAM(S): 500 TABLET, FILM COATED ORAL at 11:44

## 2019-04-26 RX ADMIN — Medication 30 UNIT(S): at 17:02

## 2019-04-26 RX ADMIN — Medication 5 MILLIGRAM(S): at 22:21

## 2019-04-26 RX ADMIN — Medication 30 UNIT(S): at 13:14

## 2019-04-26 RX ADMIN — Medication 1 SPRAY(S): at 22:20

## 2019-04-26 RX ADMIN — Medication 40 MILLIGRAM(S): at 08:38

## 2019-04-26 RX ADMIN — Medication 2: at 13:13

## 2019-04-26 RX ADMIN — Medication 3 MILLILITER(S): at 16:00

## 2019-04-26 RX ADMIN — Medication 3 MILLILITER(S): at 10:45

## 2019-04-26 RX ADMIN — HEPARIN SODIUM 5000 UNIT(S): 5000 INJECTION INTRAVENOUS; SUBCUTANEOUS at 22:21

## 2019-04-26 RX ADMIN — Medication 50 MILLIGRAM(S): at 10:48

## 2019-04-26 RX ADMIN — INSULIN GLARGINE 65 UNIT(S): 100 INJECTION, SOLUTION SUBCUTANEOUS at 22:20

## 2019-04-26 RX ADMIN — Medication 30 UNIT(S): at 09:03

## 2019-04-26 RX ADMIN — TICAGRELOR 90 MILLIGRAM(S): 90 TABLET ORAL at 17:07

## 2019-04-26 RX ADMIN — Medication 60 MILLIGRAM(S): at 17:06

## 2019-04-26 NOTE — PROGRESS NOTE ADULT - SUBJECTIVE AND OBJECTIVE BOX
covering for [facility]    Follow-up Pulm Progress Note    Appears comfortable on minimal O2 in CCU.     Medications:  MEDICATIONS  (STANDING):  ALBUTerol/ipratropium for Nebulization 3 milliLiter(s) Nebulizer every 6 hours  aspirin enteric coated 81 milliGRAM(s) Oral daily  atorvastatin 40 milliGRAM(s) Oral at bedtime  chlorhexidine 4% Liquid 1 Application(s) Topical <User Schedule>  dextrose 5%. 1000 milliLiter(s) (50 mL/Hr) IV Continuous <Continuous>  dextrose 50% Injectable 12.5 Gram(s) IV Push once  dextrose 50% Injectable 25 Gram(s) IV Push once  dextrose 50% Injectable 25 Gram(s) IV Push once  furosemide   Injectable 60 milliGRAM(s) IV Push once  heparin  Injectable 5000 Unit(s) SubCutaneous every 8 hours  influenza   Vaccine 0.5 milliLiter(s) IntraMuscular once  insulin glargine Injectable (LANTUS) 65 Unit(s) SubCutaneous at bedtime  insulin lispro (HumaLOG) corrective regimen sliding scale   SubCutaneous three times a day before meals  insulin lispro (HumaLOG) corrective regimen sliding scale   SubCutaneous at bedtime  insulin lispro Injectable (HumaLOG) 30 Unit(s) SubCutaneous three times a day before meals  levoFLOXacin  Tablet 750 milliGRAM(s) Oral every 48 hours  levothyroxine 50 MICROGram(s) Oral daily  sodium chloride 0.65% Nasal 1 Spray(s) Both Nostrils three times a day  ticagrelor 90 milliGRAM(s) Oral two times a day    MEDICATIONS  (PRN):  acetaminophen   Tablet .. 650 milliGRAM(s) Oral every 6 hours PRN Mild Pain (1 - 3), Moderate Pain (4 - 6), Severe Pain (7 - 10)  ALBUTerol/ipratropium for Nebulization. 3 milliLiter(s) Nebulizer once PRN Shortness of Breath  dextrose 40% Gel 15 Gram(s) Oral once PRN Blood Glucose LESS THAN 70 milliGRAM(s)/deciliter  diphenhydrAMINE 25 milliGRAM(s) Oral every 4 hours PRN Rash and/or Itching  glucagon  Injectable 1 milliGRAM(s) IntraMuscular once PRN Glucose LESS THAN 70 milligrams/deciliter  ondansetron Injectable 4 milliGRAM(s) IV Push once PRN Nausea and/or Vomiting    Vital Signs Last 24 Hrs  T(C): 36.9 (2019 16:00), Max: 37.2 (2019 13:34)  T(F): 98.5 (2019 16:00), Max: 98.9 (2019 13:34)  HR: 73 (2019 16:00) (73 - 87)  BP: 106/54 (2019 16:00) (88/46 - 119/93)  BP(mean): 71 (:) (63 - 71)  RR: 28 (2019 16:00) (18 - 28)  SpO2: 100% (:) (100% - 100%)    ABG - ( 2019 16:20 )  pH, Arterial: 7.44  pH, Blood: x     /  pCO2: 30    /  pO2: 125   / HCO3: 22    / Base Excess: -3.6  /  SaO2: 97.5       @ 07:01  -   @ 07:00  --------------------------------------------------------  IN: 330 mL / OUT: 600 mL / NET: -270 mL    LABS:                        10.1   12.27 )-----------( 282      ( 2019 16:20 )             33.4     26    136  |  99  |  55<H>  ----------------------------<  266<H>  3.9   |  19<L>  |  1.92<H>    Ca    9.0      2019 12:48  Phos  3.1       Mg     2.1         TPro  7.6  /  Alb  3.9  /  TBili  0.3  /  DBili  x   /  AST  16  /  ALT  9   /  AlkPhos  66  26      CARDIAC MARKERS ( 2019 12:48 )  x     / x     / 76 u/L / 2.53 ng/mL / x      CARDIAC MARKERS ( 2019 08:45 )  x     / x     / 82 u/L / 2.54 ng/mL / x      CARDIAC MARKERS ( 2019 20:12 )  x     / x     / 75 u/L / 2.93 ng/mL / x      CARDIAC MARKERS ( 2019 16:00 )  x     / x     / 71 u/L / 2.86 ng/mL / x      CARDIAC MARKERS ( 2019 12:01 )  x     / x     / 96 u/L / 7.66 ng/mL / x      CARDIAC MARKERS ( 2019 09:23 )  x     / x     / 73 u/L / 2.98 ng/mL / x        CAPILLARY BLOOD GLUCOSE      POCT Blood Glucose.: 202 mg/dL (2019 13:01)    PTT - ( 2019 06:30 )  PTT:79.7 SEC  Urinalysis Basic - ( 2019 09:30 )    Color: LIGHT YELLOW / Appearance: CLEAR / S.011 / pH: 6.0  Gluc: 70 / Ketone: NEGATIVE  / Bili: NEGATIVE / Urobili: NORMAL   Blood: NEGATIVE / Protein: NEGATIVE / Nitrite: NEGATIVE   Leuk Esterase: NEGATIVE / RBC: x / WBC x   Sq Epi: x / Non Sq Epi: x / Bacteria: x      Procalcitonin, Serum: 0.13 ng/mL (19 @ 11:36)    Serum Pro-Brain Natriuretic Peptide: 1779 pg/mL (19 @ 12:21)  Serum Pro-Brain Natriuretic Peptide: 2552 pg/mL (19 @ 23:32)    CULTURES: (if applicable)    Culture - Blood (collected 19 @ 10:34)  Source: BLOOD VENOUS  Preliminary Report (19 @ 10:36):    NO ORGANISMS ISOLATED    NO ORGANISMS ISOLATED AT 24 HOURS    Culture - Blood (collected 19 @ 10:34)  Source: BLOOD PERIPHERAL  Preliminary Report (19 @ 10:36):    NO ORGANISMS ISOLATED    NO ORGANISMS ISOLATED AT 24 HOURS    Physical Examination:  PULM: Decreased BS at bases  CVS: S1, S2 heard    RADIOLOGY REVIEWED  CXR:     CT chest:    TTE:

## 2019-04-26 NOTE — PROGRESS NOTE ADULT - ATTENDING COMMENTS
on my read of CT chest, GGO are much more likely fluid  PCT is MINIMAL  I don't think antibiotics are required  cont diuresis as per CCU

## 2019-04-26 NOTE — PROGRESS NOTE ADULT - SUBJECTIVE AND OBJECTIVE BOX
Patient seen and examined at bedside.    Overnight Events: Pt continued on diuresis. HsTrop 902->85.     Review Of Systems: No chest pain, shortness of breath, or palpitations            Medications:  acetaminophen   Tablet .. 650 milliGRAM(s) Oral every 6 hours PRN  ALBUTerol/ipratropium for Nebulization 3 milliLiter(s) Nebulizer every 6 hours  ALBUTerol/ipratropium for Nebulization. 3 milliLiter(s) Nebulizer once PRN  aspirin enteric coated 81 milliGRAM(s) Oral daily  atorvastatin 40 milliGRAM(s) Oral at bedtime  dextrose 40% Gel 15 Gram(s) Oral once PRN  dextrose 5%. 1000 milliLiter(s) IV Continuous <Continuous>  dextrose 50% Injectable 12.5 Gram(s) IV Push once  dextrose 50% Injectable 25 Gram(s) IV Push once  dextrose 50% Injectable 25 Gram(s) IV Push once  glucagon  Injectable 1 milliGRAM(s) IntraMuscular once PRN  heparin  Infusion.  Unit(s)/Hr IV Continuous <Continuous>  heparin  Injectable 3500 Unit(s) IV Push every 6 hours PRN  influenza   Vaccine 0.5 milliLiter(s) IntraMuscular once  insulin glargine Injectable (LANTUS) 65 Unit(s) SubCutaneous at bedtime  insulin lispro (HumaLOG) corrective regimen sliding scale   SubCutaneous three times a day before meals  insulin lispro (HumaLOG) corrective regimen sliding scale   SubCutaneous at bedtime  insulin lispro Injectable (HumaLOG) 30 Unit(s) SubCutaneous three times a day before meals  metoprolol tartrate 50 milliGRAM(s) Oral two times a day  ticagrelor 90 milliGRAM(s) Oral two times a day      PAST MEDICAL & SURGICAL HISTORY:  GERD (gastroesophageal reflux disease)  CAD (coronary artery disease): s/p CABG  Hypothyroidism  Hyperlipidemia  Diabetes mellitus, type 2  S/P CABG (coronary artery bypass graft)      Vitals:  T(F): 97.9 (04-26), Max: 98 (04-25)  HR: 83 (04-26) (74 - 83)  BP: 119/93 (04-26) (101/54 - 119/93)  RR: 18 (04-26)  SpO2: 100% (04-26)  I&O's Summary    25 Apr 2019 07:01  -  26 Apr 2019 07:00  --------------------------------------------------------  IN: 330 mL / OUT: 600 mL / NET: -270 mL        Physical Exam:  GENERAL: NAD  HEAD:  Atraumatic, Normocephalic  ENT: EOMI, PERRLA, conjunctiva and sclera clear, Neck supple  CHEST/LUNG: B/l crackles  HEART: Regular rate and rhythm; No murmurs, rubs, or gallops. +JVD above the mandible while sitting upright  ABDOMEN: Soft, Nontender, Nondistended; Bowel sounds present  EXTREMITIES: no LE edema  PSYCH: Nl behavior, nl affect  NEUROLOGY: AAOx3, non-focal, cranial nerves intact  SKIN: Normal color, No rashes or lesions                            10.6   14.28 )-----------( 331      ( 26 Apr 2019 06:30 )             35.1     04-26    142  |  106  |  57<H>  ----------------------------<  98  3.9   |  16<L>  |  1.89<H>    Ca    9.3      26 Apr 2019 06:30  Phos  3.9     04-25  Mg     2.3     04-26      PTT - ( 26 Apr 2019 06:30 )  PTT:79.7 SEC  CARDIAC MARKERS ( 25 Apr 2019 20:12 )  x     / x     / 75 u/L / 2.93 ng/mL / x      CARDIAC MARKERS ( 25 Apr 2019 16:00 )  x     / x     / 71 u/L / 2.86 ng/mL / x      CARDIAC MARKERS ( 25 Apr 2019 12:01 )  x     / x     / 96 u/L / 7.66 ng/mL / x      CARDIAC MARKERS ( 25 Apr 2019 09:23 )  x     / x     / 73 u/L / 2.98 ng/mL / x          Serum Pro-Brain Natriuretic Peptide: 1779 pg/mL (04-24 @ 12:21)  Serum Pro-Brain Natriuretic Peptide: 2552 pg/mL (04-23 @ 23:32)      4/25 13:45. NSR at 72 BPM, new TW flattening in V1-V2 compared to EKG at 8:57 today      TTE: 4/25/2019  ------------------------------------------------------------------------  DIMENSIONS:  Dimensions:     Normal Values:  LA:     3.8 cm    2.0 - 4.0 cm  Ao:     3.3 cm    2.0 - 3.8 cm  SEPTUM: 0.9 cm    0.6 - 1.2 cm  PWT:    0.9 cm    0.6 - 1.1 cm  LVIDd:  5.5 cm    3.0 - 5.6 cm  LVIDs:  4.5 cm    1.8 - 4.0 cm  Derived Variables:  LVMI: 131 g/m2  RWT: 0.32  Fractional short: 18 %  Ejection Fraction (Visual Estimate): 20-25 %  ------------------------------------------------------------------------  OBSERVATIONS:  Mitral Valve: Tethered mitral valve leaflets.  Moderate  mitral regurgitation.  Aortic Root: Normal aortic root.  Aortic Valve: Normal trileaflet aortic valve. No aortic  valve regurgitation seen.  Left Atrium: Mild left atrial enlargement.  Left Ventricle: Endocardial visualization enhanced with  intravenous injection of echo contrast (Definity).  Left ventricular ejection fraction, by visual estimation,  is 20-25%. No LV thrombus.  Severe global hypokinesia.  Elevated left atrial pressure>30mmHG.  Right Heart: Normal right atrium. Right venticle normal  size with severely reduced systolic function.  Normal  tricuspid valve.  Mild tricuspid regurgitation. Normal  pulmonic valve. Mild pulmonic regurgitation.  Pericardium/PleuraNormal pericardium with no pericardial  effusion.  Hemodynamic: Estimated right ventricular systolic pressure  equals 62 mm Hg, assuming right atrial pressure equals 10  mm Hg, consistent with severe pulmonary hypertension.  ------------------------------------------------------------------------  CONCLUSIONS:  Technically difficult study. Endocardial visualization  enhanced with intravenous injection of echo contrast  (Definity).  1. Mild left atrial enlargement.  2. Left ventricular ejection fraction, by visual  estimation, is 20-25%.  Severe global hypokinesia. No LV  thrombus.  3. Elevated left atrial pressure>30mmHG.  4. Normal right atrium.  5. Right venticle normal size with severely reduced  systolic function.  6. Pulmonary artery systolic pressure is 62 mmHG=severe  pulmonary hypertension.  7. No significant valvular abnormalities.  8. Normal pericardium with no pericardial effusion.  ------------------------------------------------------------------------  Confirmed on  4/26/2019 - 03:43:36 by Jacqui Guerra M.D.  ------------------------------------------------------------------------      TTE 9/6/2018:  ------------------------------------------------------------------------  DIMENSIONS:  Dimensions:     Normal Values:  LA:     2.9 cm    2.0 - 4.0 cm  Ao:     3.0 cm    2.0 - 3.8 cm  SEPTUM: 0.8 cm    0.6 - 1.2 cm  PWT:    0.9 cm    0.6 - 1.1 cm  LVIDd:  5.0 cm    3.0 - 5.6 cm  LVIDs:  4.1 cm    1.8 - 4.0 cm  Derived Variables:  LVMI: 96 g/m2  RWT: 0.36  Fractional short: 18 %  Ejection Fraction (Teicholtz): 37 %  ------------------------------------------------------------------------  OBSERVATIONS:  Mitral Valve: Mitral annular calcification, otherwise  normal mitral valve. Mild mitral regurgitation.  Aortic Root: Normal aortic root.  Aortic Valve: Aortic valve leaflet morphology not well  visualized.  Left Atrium: Normal left atrium.  Left Ventricle: Endocardium not well visualized; grossly  moderate to severe global left ventricular systolic  dysfunction.  The inferolateral wall appears particularly  hypokinetic.  Endocardial visualization enhanced with  intravenous injection of echo contrast (Definity). Normal  left ventricular internal dimensions and wall thicknesses.  Right Heart: Normal right atrium. The right ventricle is  not well visualized; grossly normal right ventricular  systolic function. Normal tricuspid valve. Minimal  tricuspid regurgitation. Normal pulmonic valve.  Pericardium/Pleura Normal pericardium with no pericardial  effusion.  ------------------------------------------------------------------------  CONCLUSIONS:  1. Mitral annular calcification, otherwise normal mitral  valve. Mild mitral regurgitation.  2. Normal left ventricular internal dimensions and wall  thicknesses.  3. Endocardium not well visualized; grossly moderate to  severe global left ventricular systolic dysfunction.  The  inferolateral wall appears particularly hypokinetic.  Endocardial visualization enhanced with intravenous  injection of echo contrast (Definity).  4. The right ventricle is not well visualized; grossly  normal right ventricular systolic function.  ------------------------------------------------------------------------  Confirmed on  9/6/2018 - 16:28:43 by Jose Lucia M.D.  ------------------------------------------------------------------------    Cath 11/2018:  VENTRICLES: No left ventriculogram was performed.  CORONARY VESSELS:  RI:   --  Ramus intermedius: There was a diffuse 90 % stenosis at the site of a prior stent, December, 2017. The lesion was smoothly contoured. There was PARUL grade 3 flow through the vessel (brisk flow) and a moderate-sized vascular territory distal to the lesion. This is a likely culprit for the patient's clinical presentation. An intervention was performed.  COMPLICATIONS: There were no complications.  DIAGNOSTIC IMPRESSIONS: Successful PCI to severe ISR of Ramus using 3.0  Promus CORNELIA  LVEDP 32mmhG  DIAGNOSTIC RECOMMENDATIONS: Continue DAPT  Optimize heart failure therapy  INTERVENTIONAL IMPRESSIONS: Successful PCI to severe ISR of Ramus using 3.0  Promus CORNELIA  LVEDP 32mmhG  INTERVENTIONAL RECOMMENDATIONS: Continue DAPT  Optimize heart failure therapy  Prepared and signed by  Gretchen Guerra M.D.    [facility] 11/5/2018  CORONARY VESSELS: The coronary circulation is right dominant.  LM:   --  LM: Angiography showed mild atherosclerosis with no flow limiting  lesions.  LAD:   --  LAD: The distal vessel was supplied by extensive retrograde flow  from the graft(s) to the mid LAD.  --  Proximal LAD: There was a 90 % stenosis.  --  Mid LAD: There was a 100 % stenosis. This lesion is a chronic total  occlusion.  CX:   --  Circumflex: Angiography showed mild atherosclerosis with no flow  limiting lesions.  RI:   --  Proximal ramus intermedius: There was a diffuse 90 % stenosis at  a site with no prior intervention, at the proximal margin of the stented  segment. There was PARUL grade 3 flow through the vessel (brisk flow). This  is a likely culprit for the patient's clinical presentation.  RCA:   --  RCA: The distal vessel was supplied by collaterals from septal  branches of LAD.  --  Mid RCA: There was a 100 % stenosis. There was a moderate-sized  vascular territory distal to the lesion and good collateral blood supply  to the distal myocardium. This lesion is a chronic total occlusion.  GRAFTS:   --  Graft to the LAD: The graft was a medium sized LIMA. Graft  angiography showed no degeneration. Distal vessel angiography showed mild  diffuse disease.  COMPLICATIONS: There were no complications.  DIAGNOSTIC IMPRESSIONS: Patent GOMES to LAD  Severe stenosis of ostial Ramus (proximal stent edge)  Elevated LVEDP  DIAGNOSTIC RECOMMENDATIONS: Optimize heart failure therapy and PCI to Ramus  when patient is optimized  INTERVENTIONAL IMPRESSIONS: Patent GOMES to LAD  Severe stenosis of ostial Ramus (proximal stent edge)  Elevated LVEDP  INTERVENTIONAL RECOMMENDATIONS: Optimize heart failure therapy and PCI to  Ramus when patient is optimized  Prepared and signed by  Gretchen Guerra M.D.      Interpretation of Telemetry:    Imaging:    Portable CXR: 4/25/2019     IMPRESSION:  Hazy left retrocardiac opacity which may be due to   subsegmental atelectasis and/or developing pneumonia.    CATRACHITA KEENAN M.D., ATTENDING RADIOLOGIST  This document has been electronically signed. Apr 25 2019 11:07AM

## 2019-04-26 NOTE — PROGRESS NOTE ADULT - SUBJECTIVE AND OBJECTIVE BOX
Patient seen and examined at bedside with daughter in law at bedside per pt's request.  c/o stable sob and zee  Case discussed with medical team    HPI:  73YOF with hx of CAD s/p CABG, hypothyroidism, CKD, CHF, HTN, DM p/w worsening ZEE and sob. Patient usually can walk up a few steps before feeling sob, currently feeling sob after walking from living room to kitchen.  She is using 3 pillows to sleep. No cough, fevers. (+) CP, SS and constant, She experienced more ZEE for last two days.  She has no edema in lower extremety.  Last admission to [facility] was . (2019 10:21)      PAST MEDICAL & SURGICAL HISTORY:  GERD (gastroesophageal reflux disease)  CAD (coronary artery disease): s/p CABG  Hypothyroidism  Hyperlipidemia  Diabetes mellitus, type 2  S/P CABG (coronary artery bypass graft)      No Known Allergies       MEDICATIONS  (STANDING):  ALBUTerol/ipratropium for Nebulization 3 milliLiter(s) Nebulizer every 6 hours  aspirin enteric coated 81 milliGRAM(s) Oral daily  atorvastatin 40 milliGRAM(s) Oral at bedtime  azithromycin  IVPB      azithromycin  IVPB 500 milliGRAM(s) IV Intermittent once  cefTRIAXone   IVPB 1 Gram(s) IV Intermittent once  cefTRIAXone   IVPB      dextrose 5%. 1000 milliLiter(s) (50 mL/Hr) IV Continuous <Continuous>  dextrose 50% Injectable 12.5 Gram(s) IV Push once  dextrose 50% Injectable 25 Gram(s) IV Push once  dextrose 50% Injectable 25 Gram(s) IV Push once  influenza   Vaccine 0.5 milliLiter(s) IntraMuscular once  insulin glargine Injectable (LANTUS) 65 Unit(s) SubCutaneous at bedtime  insulin lispro (HumaLOG) corrective regimen sliding scale   SubCutaneous three times a day before meals  insulin lispro (HumaLOG) corrective regimen sliding scale   SubCutaneous at bedtime  insulin lispro Injectable (HumaLOG) 30 Unit(s) SubCutaneous three times a day before meals  metoprolol tartrate 50 milliGRAM(s) Oral two times a day  ticagrelor 90 milliGRAM(s) Oral two times a day    MEDICATIONS  (PRN):  acetaminophen   Tablet .. 650 milliGRAM(s) Oral every 6 hours PRN Mild Pain (1 - 3), Moderate Pain (4 - 6), Severe Pain (7 - 10)  ALBUTerol/ipratropium for Nebulization. 3 milliLiter(s) Nebulizer once PRN Shortness of Breath  dextrose 40% Gel 15 Gram(s) Oral once PRN Blood Glucose LESS THAN 70 milliGRAM(s)/deciliter  glucagon  Injectable 1 milliGRAM(s) IntraMuscular once PRN Glucose LESS THAN 70 milligrams/deciliter  ondansetron Injectable 4 milliGRAM(s) IV Push once PRN Nausea and/or Vomiting      REVIEW OF SYSTEMS:  CONSTITUTIONAL: (+) malaise.   EYES: No acute change in vision   ENT:  No tinnitus  NECK: No stiffness  RESPIRATORY: zee. sob. No hemoptysis  CARDIOVASCULAR: No active chest pain, palpitations, syncope  GASTROINTESTINAL: No hematemesis, diarrhea, melena, or hematochezia.  GENITOURINARY: No hematuria  NEUROLOGICAL: No headaches  LYMPH Nodes: No enlarged glands  ENDOCRINE: No heat or cold intolerance	    T(C): 36.7 (19 @ 10:47), Max: 36.7 (19 @ 13:33)  HR: 81 (19 @ 10:47) (74 - 83)  BP: 116/52 (19 @ 10:47) (101/54 - 119/93)  RR: 18 (19 @ 10:47) (18 - 19)  SpO2: 100% (19 @ 10:47) (100% - 100%)    PHYSICAL EXAMINATION:   Constitutional: ill appearing  HEENT: NC, AT  Neck:  Supple  Respiratory: rales. Adequate airflow b/l. Not using accessory muscles of respiration.  Cardiovascular: systolic murmur, S1 & S2 intact, no R/G, 2+ radial pulses b/l  Gastrointestinal: Soft, NT, ND, normoactive b.s., no organomegaly/RT/rigidity  Extremities: WWP  Neurological:  Alert and awake.  Crude sensation intact.     Labs and imaging reviewed    LABS:                        10.6   14. )-----------( 331      ( 2019 06:30 )             35.1         142  |  106  |  57<H>  ----------------------------<  98  3.9   |  16<L>  |  1.89<H>    Ca    9.3      2019 06:30  Phos  3.9     04-25  Mg     2.3     04-26      CARDIAC MARKERS ( 2019 08:45 )  x     / x     / 82 u/L / 2.54 ng/mL / x      CARDIAC MARKERS ( 2019 20:12 )  x     / x     / 75 u/L / 2.93 ng/mL / x      CARDIAC MARKERS ( 2019 16:00 )  x     / x     / 71 u/L / 2.86 ng/mL / x      CARDIAC MARKERS ( 2019 12:01 )  x     / x     / 96 u/L / 7.66 ng/mL / x      CARDIAC MARKERS ( 2019 09:23 )  x     / x     / 73 u/L / 2.98 ng/mL / x          PTT - ( 2019 06:30 )  PTT:79.7 SEC  Urinalysis Basic - ( 2019 09:30 )    Color: LIGHT YELLOW / Appearance: CLEAR / S.011 / pH: 6.0  Gluc: 70 / Ketone: NEGATIVE  / Bili: NEGATIVE / Urobili: NORMAL   Blood: NEGATIVE / Protein: NEGATIVE / Nitrite: NEGATIVE   Leuk Esterase: NEGATIVE / RBC: x / WBC x   Sq Epi: x / Non Sq Epi: x / Bacteria: x      CAPILLARY BLOOD GLUCOSE      POCT Blood Glucose.: 129 mg/dL (2019 08:44)  POCT Blood Glucose.: 116 mg/dL (2019 22:16)  POCT Blood Glucose.: 185 mg/dL (2019 18:32)  POCT Blood Glucose.: 181 mg/dL (2019 12:47)          ABG - ( 2019 09:30 )  pH, Arterial: 7.44  pH, Blood: x     /  pCO2: 31    /  pO2: 99    / HCO3: 23    / Base Excess: -2.7  /  SaO2: 96.8                RADIOLOGY & ADDITIONAL STUDIES:

## 2019-04-26 NOTE — CONSULT NOTE ADULT - PROBLEM SELECTOR RECOMMENDATION 9
Mild- moderately elevated JVP. Severe LV dysfucntion. Severe pulm HTN. severe MR.   Suggest Lasix 60 mg IV daily. Keep net negative 1 L.   Not on ACEI/ARB/ARNI due to MERVIN.   Consider adding hydral/Isordil when BP improves.   Strict I/O and daily standing weights.   Not on BB currently, ok to keep holding. Mild- moderately elevated JVP. Severe LV dysfunction. Severe pulm HTN. severe MR.   Suggest Lasix 60 mg IV x 1. Keep net negative 1 L.   Not on ACEI/ARB/ARNI due to MERVIN.   Consider adding hydral/Isordil when BP improves.   Strict I/O and daily standing weights.   Not on BB currently, ok to keep holding. Mild- moderately elevated JVP. Severe LV dysfunction. Severe pulm HTN. mod-severe MR.   Suggest Lasix 60 mg IV x 1. Keep net negative 1 L.   Not on ACEI/ARB/ARNI due to MERVIN.   Consider adding hydral/Isordil when BP improves. start hydral 5 mg q8h (hold for SBP <90)   Strict I/O and daily standing weights.   Not on BB currently, ok to keep holding.

## 2019-04-26 NOTE — PROGRESS NOTE ADULT - SUBJECTIVE AND OBJECTIVE BOX
Infectious Diseases progress note:    Subjective:  Events noted.  CT chest results reviewed with PA, started on adolfo/azithro for possible pna.  PCT slightly elevated.  WBC increased.  PT with worsening sob.  To be transferred to CCU.  Pt developed urticaria on b/l arms after azithro - now dc'd.      ROS:  CONSTITUTIONAL:  No fever, chills, rigors  CARDIOVASCULAR:  cp  RESPIRATORY:   (+) sob/pino  GASTROINTESTINAL:  No abd pain, N/V, diarrhea/constipation  EXTREMITIES:  No swelling or joint pain  GENITOURINARY:  No burning on urination, increased frequency or urgency.  No flank pain  NEUROLOGIC:  No HA, visual disturbances  SKIN: No rashes    Allergies    azithromycin (Hives; Pruritus)    Intolerances        ANTIBIOTICS/RELEVANT:  antimicrobials    immunologic:  influenza   Vaccine 0.5 milliLiter(s) IntraMuscular once    OTHER:  acetaminophen   Tablet .. 650 milliGRAM(s) Oral every 6 hours PRN  ALBUTerol/ipratropium for Nebulization 3 milliLiter(s) Nebulizer every 6 hours  ALBUTerol/ipratropium for Nebulization. 3 milliLiter(s) Nebulizer once PRN  aspirin enteric coated 81 milliGRAM(s) Oral daily  atorvastatin 40 milliGRAM(s) Oral at bedtime  chlorhexidine 4% Liquid 1 Application(s) Topical <User Schedule>  dextrose 40% Gel 15 Gram(s) Oral once PRN  dextrose 5%. 1000 milliLiter(s) IV Continuous <Continuous>  dextrose 50% Injectable 12.5 Gram(s) IV Push once  dextrose 50% Injectable 25 Gram(s) IV Push once  dextrose 50% Injectable 25 Gram(s) IV Push once  diphenhydrAMINE 25 milliGRAM(s) Oral every 4 hours PRN  glucagon  Injectable 1 milliGRAM(s) IntraMuscular once PRN  heparin  Injectable 5000 Unit(s) SubCutaneous every 8 hours  insulin glargine Injectable (LANTUS) 65 Unit(s) SubCutaneous at bedtime  insulin lispro (HumaLOG) corrective regimen sliding scale   SubCutaneous three times a day before meals  insulin lispro (HumaLOG) corrective regimen sliding scale   SubCutaneous at bedtime  insulin lispro Injectable (HumaLOG) 30 Unit(s) SubCutaneous three times a day before meals  levothyroxine 50 MICROGram(s) Oral daily  ondansetron Injectable 4 milliGRAM(s) IV Push once PRN  sodium chloride 0.65% Nasal 1 Spray(s) Both Nostrils three times a day  ticagrelor 90 milliGRAM(s) Oral two times a day      Objective:  Vital Signs Last 24 Hrs  T(C): 37.2 (2019 13:34), Max: 37.2 (2019 13:34)  T(F): 98.9 (2019 13:34), Max: 98.9 (2019 13:34)  HR: 79 (2019 15:00) (74 - 87)  BP: 93/50 (2019 15:00) (88/46 - 119/93)  BP(mean): 63 (2019 15:00) (63 - 68)  RR: 26 (2019 15:00) (18 - 26)  SpO2: 100% (2019 15:00) (100% - 100%)    PHYSICAL EXAM:  Constitutional: tacypneic  Eyes:MOHSEN, EOMI  Ear/Nose/Throat: no thrush, mucositis.  Moist mucous membranes	  Neck:no JVD, no lymphadenopathy, supple  Respiratory: bibasilar crackes  Cardiovascular: S1S2 RRR, no murmurs  Gastrointestinal:soft, nontender,  nondistended (+) BS  Extremities:no e/e/c  Skin:  b/l UE urticaria        LABS:                        8.4    10.62 )-----------( 246      ( 2019 12:48 )             29.0         136  |  99  |  55<H>  ----------------------------<  266<H>  3.9   |  19<L>  |  1.92<H>    Ca    9.0      2019 12:48  Phos  3.1       Mg     2.1         TPro  7.6  /  Alb  3.9  /  TBili  0.3  /  DBili  x   /  AST  16  /  ALT  9   /  AlkPhos  66      PTT - ( 2019 06:30 )  PTT:79.7 SEC  Urinalysis Basic - ( 2019 09:30 )    Color: LIGHT YELLOW / Appearance: CLEAR / S.011 / pH: 6.0  Gluc: 70 / Ketone: NEGATIVE  / Bili: NEGATIVE / Urobili: NORMAL   Blood: NEGATIVE / Protein: NEGATIVE / Nitrite: NEGATIVE   Leuk Esterase: NEGATIVE / RBC: x / WBC x   Sq Epi: x / Non Sq Epi: x / Bacteria: x        Procalcitonin, Serum: 0.13 ( @ 11:36)                    MICROBIOLOGY:      Culture - Blood (19 @ 10:34)    Culture - Blood:   NO ORGANISMS ISOLATED  NO ORGANISMS ISOLATED AT 24 HOURS    Specimen Source: BLOOD VENOUS    Culture - Blood (19 @ 10:34)    Culture - Blood:   NO ORGANISMS ISOLATED  NO ORGANISMS ISOLATED AT 24 HOURS    Specimen Source: BLOOD PERIPHERAL        RADIOLOGY & ADDITIONAL STUDIES:    < from: US Duplex Venous Lower Ext Complete, Bilateral (19 @ 09:59) >    FINDINGS:    There is normal compressibility of the bilateral common femoral, femoral   and popliteal veins. No calf vein thrombosis is detected.    Doppler examination shows normal spontaneous and phasic flow.    IMPRESSION:     No evidence of bilateral lower extremity deep venous thrombosis.    < end of copied text >        < from: CT Chest No Cont (19 @ 20:27) >  INTERPRETATION:  CT CHEST WITHOUT CONTRAST    INDICATION: Shortness of breath, rule out pneumonia    TECHNIQUE: Unenhanced helical images were obtained of the chest. Coronal   and sagittalimages were reconstructed. Maximum intensity projection   images were generated.     COMPARISON: CT chest 2018    FINDINGS: The quality of the images are degraded by respiratory motion   and expiratory phase of the imaging.    AIRWAYS, LUNGS, PLEURA: Patent central airways. No bronchial wall   thickening or bronchiectasis.    Mosaic attenuation of the lungs.    No consolidation.    Trace layering bilateral pleural effusions.    MEDIASTINUM, LYMPH NODES: No lymphadenopathy.    HEART AND VASCULATURE: The heart is mildly enlarged without pericardial   effusion. Thoracic aorta and pulmonary artery normal in course and   caliber. CABG.    UPPER ABDOMEN: Within normal limits.    BONES AND SOFT TISSUES: No aggressive osseous lesion. Degenerative   changes of the spine. Fatty atrophy of the paraspinous muscles.    LOWER NECK: Within normal limits.    IMPRESSION:     Mosaic attenuation of the lungs. This finding is indeterminant and may be   secondary to air trapping, pneumonitis or atypical infection.    < end of copied text >

## 2019-04-26 NOTE — PROGRESS NOTE ADULT - ASSESSMENT
73yoF with history of CAD s/p CABG, systolic CHF (EF 20%), CKD III, who p/w sob and pino. Admitted for acute decompensated systolic heart failure 73yoF with history of CAD s/p CABG, systolic CHF (EF 20%), CKD III, who p/w sob and pino. Admitted for acute decompensated systolic heart failure. Hospital course complicated by hypotension in the setting of beta blocker administration during acute decompensated HF.

## 2019-04-26 NOTE — CHART NOTE - NSCHARTNOTEFT_GEN_A_CORE
Notified by RN that patient had reaction to azithromycin.  Assessed patient at bedside.  Patient developed urticaria & pruritis on arms after receiving azithromycin.  No airway compromise or angioedema.  No diffuse rash noted.  Discussed case with ID Dr. Roman.  Recommended to discontinue azithromycin & to continue on CTX for now only as ordered.  ID to follow up today regarding antibiotic course.  Benadryl PRN ordered.  Attending MD Dr. Velarde made aware.

## 2019-04-26 NOTE — PROGRESS NOTE ADULT - PROBLEM SELECTOR PLAN 1
- lasix IV prn. Already got 40mg IV today. Will continue to assess need for diuresis daily   - hold beta blockers in the setting of acute decompensated heart failure  - monitor strict I/O  - c/w Dayne

## 2019-04-26 NOTE — PROGRESS NOTE ADULT - ASSESSMENT
73YOF w/o h/o CAD, s/p CABG Systolic CHF, CKD 3b, who p/w sob and pino. Admitted for:    - Acute on Chronic Systolic CHF Exacerbation:      - c/w diuresis      - c/w cardiac meds   - NSTEMI:      - brilinta, asa      - adjust management per cards, otherwise c/w cardiac meds      - optimize renal function and fluid status, eventual cardiac cath      - tele  - Abnormal CT Chest Findings:      - f/u ID/Pulm for abx vs conservative management  - CAD:      - asa, statin, bb   - Acute Kidney Injury:      - on CKD 3      - progressing well. C/w medical optimization of co-morbidities      - trend renal function      - avoid nephrotoxic rx   - Uncontrolled DM II with long term complications of hyperglycemia and CKD:      - c/w dm rx, dm education, monitor acks

## 2019-04-26 NOTE — PROGRESS NOTE ADULT - ASSESSMENT
73YOF with hx of CAD s/p CABG, hypothyroidism, CKD, CHF, HTN, DM p/w worsening ZEE and sob. Patient usually can walk up a few steps before feeling sob, currently feeling sob after walking from living room to kitchen.  She is using 3 pillows to sleep. No cough, fevers. (+) CP, SS and constant, She experienced more EZE for last two days.  She has no edema in lower extremity.  Last admission to [facility] was 11/18. (24 Apr 2019 10:21)    ER vss.  Pt afebrile.  WBC 8.7 --> 10.7.  UA (-), RVP (-).  4/25 cxr with L hazy retrocardiac opacity.   Pt found to have NSTEMI and gross fluid overload, started on IV lasix.  She is pending LHC.        ID consult called for further abx managment and evaluation for pna.       Recommend:    - Pt with ZEE/SOB, (+) NSTEMI.  Cxr with L hazy opacity.  Pt w/o cough or fever to suggest pna on clinical basis.  CT chest shows mosaic attentuation/pneumonitis/atypical infection.       - Cont rocephin.  Azithro d/c'd due to allergic reaction.  Check Legionella Ag.  Pct is mildly elevated.  WBC increased today, cont to trend    - Check repeat cxr following diuresis, evaluate for developing pna.     - Pt evaluated for CCU, for transfer for continued managment of acute decompensated CHF.     - Plan for eventual LHC when medically optimized.    Will follow,    Joselin Roman  [phone]

## 2019-04-26 NOTE — PROGRESS NOTE ADULT - ASSESSMENT
73YOF with hx of CAD s/p CABG, hypothyroidism, CKD, CHF, HTN, DM p/w worsening ZEE and sob.    A/p  MERVIN  scr on admission 2.21  MERVIN Likely sec to  renal vasal congestion  Renal function relatively stable  Diuretics  per cardio  monitor bmp  avoid nephrotoxic agents  **If pt planned for cardiac cath , pt is 26% risk of developing NGHIA and 1.09% risk of requiring RRT. In view of fluid overload status, no IVF prior to cath. Recommend to hold morning dose of lasix on the day of procedure    CKD stage 3  baseline cr 1.5-1.8  likely sec to HTN/DM/chf  elevated PTH, r/o vit d def check vit d 25  phos optimal    CHF  monitor daily weight and i/o  diuretics per cardio  f/u cardio

## 2019-04-26 NOTE — PROGRESS NOTE ADULT - PROBLEM SELECTOR PLAN 2
- patient with isolated troponins of 902, all other readings in the 80s. Likely a spurious reading   - c/w aspirin brilinta   - optimize renal function and fluid status, eventual cardiac cath  - tele

## 2019-04-26 NOTE — CONSULT NOTE ADULT - ASSESSMENT
74 y/o female with pmhx of HTN, DM, CAD s/p CABG, HFrEF (LVEF 25%) severe MR, severe pulm HTN comes in with cough and SOB.   CT chest showed ground glass opacities, patient was started on IV zithromax, developed rash, now on Levaquin She was admitted to telemetry was given a dose of lopressor, patient became hypotensive and went into respiratory distress, patient was then moved to CCU. HF was consulted to help manage this patient who is SOB and is hypotensive. She admits to SOB at rest, orthopnea, cough. Denies fevers and sick contacts. No CP, palpitations, dizziness.

## 2019-04-26 NOTE — PROGRESS NOTE ADULT - ASSESSMENT
Pt is a 72 yo woman with a PMHx of CAD s/p CABG, hypothyroidism, CKD, HFrEF (EF 37%), HTN, DM presenting on 4/24 with worsening SOB and ZEE in the setting of ADHF, now with NSTEMI.    #NSTEMI: HsTrop 79->908->85  -Contiue asa/brilinta/heparin/statin  -On metoprolol  (Hold for SBP <100)  -Tentative plan for [facility] once fluid status optimized  -Fluid restrict, monitor I/Os, daily weights   -Monitor lytes, cr  -Supplemental O2 Pt is a 74 yo woman with a PMHx of CAD s/p CABG, hypothyroidism, CKD, HFrEF (EF 37%), HTN, DM presenting on 4/24 with worsening SOB and ZEE in the setting of ADHF, now with NSTEMI.    #Elevated troponin: HsTrop 79->908->85. Likely lab error given rapid rise/fall.  -Contiue asa/brilinta/statin  -D/C heparin  -On metoprolol  (Hold for SBP <100)    #HFrEF:  -Lasix 40 IV BID  -Fluid restrict, monitor I/Os, daily weights   -Monitor lytes, cr  -Supplemental O2   -F/u V/Q, LE dopplers Pt is a 74 yo woman with a PMHx of CAD s/p CABG, hypothyroidism, CKD, HFrEF (EF 37%), HTN, DM presenting on 4/24 with worsening SOB and ZEE in the setting of ADHF, now with NSTEMI.    #Elevated troponin: HsTrop 79->908->85. Likely lab error given rapid rise/fall.  -Contiue asa/brilinta/statin  -D/C heparin    #HFrEF:  -Lasix 40 IV BID  -Fluid restrict, monitor I/Os, daily weights   -Hold bb  -Monitor lytes, cr  -Supplemental O2   -F/u V/Q, LE dopplers

## 2019-04-26 NOTE — PROGRESS NOTE ADULT - ASSESSMENT
73YOF w/o h/o CAD, s/p CABG CHF adm to [facility] for Acute on Chronic systolic and diastolic HF

## 2019-04-26 NOTE — PROGRESS NOTE ADULT - SUBJECTIVE AND OBJECTIVE BOX
INTERVAL HPI/OVERNIGHT EVENTS: Notified by RN that patient with increasing shortness of breath & tachypnea.  Assessed patient at bedside.  She continues to complain of chest pain and shortness of breath.  Her repeat EKG is unchanged from prior and without new ischemic changes.  Patient found to be hypotensive with SBP in 80's.  On exam, bibasilar crackles appreciated & patient tachypneic.  No pedal edema noted.  Called cardiology to evaluate.  Cardiology fellow & cardiology attending MD came to bedside.  Decision made to stop BB due to hypotension.  Patient with acute decompensated CHF now with hypotension.  Full set of repeat labs sent including cardiac enzymes.  As per cardiology attending Dr. Guerra, patient transferred to CCU for closer monitoring & diuresis.    SUBJECTIVE: Patient seen and examined at bedside. Patient currently complaining of nasal congestion and bilateral foot pain. Otherwise denies chest pain/shortness of breath/abdominal pain/nausea/vomiting/diarrhea.     ROS: otherwise negative    OBJECTIVE:    VITAL SIGNS:  ICU Vital Signs Last 24 Hrs  T(C): 37.2 (2019 13:34), Max: 37.2 (2019 13:34)  T(F): 98.9 (2019 13:34), Max: 98.9 (2019 13:34)  HR: 79 (2019 14:00) (74 - 87)  BP: 99/46 (2019 14:00) (88/46 - 119/93)  BP(mean): 63 (2019 14:00) (63 - 68)  RR: 19 (2019 14:00) (18 - 26)  SpO2: 100% (2019 14:00) (100% - 100%)     @ 07: @ 07:00  --------------------------------------------------------  IN: 330 mL / OUT: 600 mL / NET: -270 mL     @ 07: @ 14:41  --------------------------------------------------------  IN: 120 mL / OUT: 400 mL / NET: -280 mL    CAPILLARY BLOOD GLUCOSE    POCT Blood Glucose.: 202 mg/dL (2019 13:01)    PHYSICAL EXAM:  General: elderly female, appearing stated age. Breathing well    HEENT: normocephalic, atraumatic. Moist mucous membranes   Eyes: clear conjunctiva, PERRL  Neck: supple. JVD to mandible.   Respiratory: Vertical scar with keloid formation. Bibasilar crackles. Clear to auscultation anteriorly. Breathing well on 3L.   Cardiovascular: S1, S2. Regular rate and rhythm. No murmurs/rubs/gallops  Abdomen: soft, nondistended, nontender. +BS  Extremities: WWP, 2+ peripheral pulses b/l; no LE edema  Skin: normal color and turgor; no rash  Neurological: A&Ox3     MEDICATIONS:  MEDICATIONS  (STANDING):  ALBUTerol/ipratropium for Nebulization 3 milliLiter(s) Nebulizer every 6 hours  aspirin enteric coated 81 milliGRAM(s) Oral daily  atorvastatin 40 milliGRAM(s) Oral at bedtime  dextrose 5%. 1000 milliLiter(s) (50 mL/Hr) IV Continuous <Continuous>  dextrose 50% Injectable 12.5 Gram(s) IV Push once  dextrose 50% Injectable 25 Gram(s) IV Push once  dextrose 50% Injectable 25 Gram(s) IV Push once  heparin  Injectable 5000 Unit(s) SubCutaneous every 8 hours  influenza   Vaccine 0.5 milliLiter(s) IntraMuscular once  insulin glargine Injectable (LANTUS) 65 Unit(s) SubCutaneous at bedtime  insulin lispro (HumaLOG) corrective regimen sliding scale   SubCutaneous three times a day before meals  insulin lispro (HumaLOG) corrective regimen sliding scale   SubCutaneous at bedtime  insulin lispro Injectable (HumaLOG) 30 Unit(s) SubCutaneous three times a day before meals  ticagrelor 90 milliGRAM(s) Oral two times a day    MEDICATIONS  (PRN):  acetaminophen   Tablet .. 650 milliGRAM(s) Oral every 6 hours PRN Mild Pain (1 - 3), Moderate Pain (4 - 6), Severe Pain (7 - 10)  ALBUTerol/ipratropium for Nebulization. 3 milliLiter(s) Nebulizer once PRN Shortness of Breath  dextrose 40% Gel 15 Gram(s) Oral once PRN Blood Glucose LESS THAN 70 milliGRAM(s)/deciliter  diphenhydrAMINE 25 milliGRAM(s) Oral every 4 hours PRN Rash and/or Itching  glucagon  Injectable 1 milliGRAM(s) IntraMuscular once PRN Glucose LESS THAN 70 milligrams/deciliter  ondansetron Injectable 4 milliGRAM(s) IV Push once PRN Nausea and/or Vomiting    ALLERGIES:  Allergies    azithromycin (Hives; Pruritus)    Intolerances    LABS:                        8.4    10.62 )-----------( 246      ( 2019 12:48 )             29.0         136  |  99  |  55<H>  ----------------------------<  266<H>  3.9   |  19<L>  |  1.92<H>    Ca    9.0      2019 12:48  Phos  3.1       Mg     2.1         TPro  7.6  /  Alb  3.9  /  TBili  0.3  /  DBili  x   /  AST  16  /  ALT  9   /  AlkPhos  66      PTT - ( 2019 06:30 )  PTT:79.7 SEC  Urinalysis Basic - ( 2019 09:30 )    Color: LIGHT YELLOW / Appearance: CLEAR / S.011 / pH: 6.0  Gluc: 70 / Ketone: NEGATIVE  / Bili: NEGATIVE / Urobili: NORMAL   Blood: NEGATIVE / Protein: NEGATIVE / Nitrite: NEGATIVE   Leuk Esterase: NEGATIVE / RBC: x / WBC x   Sq Epi: x / Non Sq Epi: x / Bacteria: x    RADIOLOGY & ADDITIONAL TESTS: Reviewed.    < from: TTE with Doppler (w/Cont) (19 @ 21:09) >  Ejection Fraction (Visual Estimate): 20-25 %  ------------------------------------------------------------------------  OBSERVATIONS:  Mitral Valve: Tethered mitral valve leaflets.  Moderate  mitral regurgitation.  Aortic Root: Normal aortic root.  Aortic Valve: Normal trileaflet aortic valve. No aortic  valve regurgitation seen.  Left Atrium: Mild left atrial enlargement.  Left Ventricle: Endocardial visualization enhanced with  intravenous injection of echo contrast (Definity).  Left ventricular ejection fraction, by visual estimation,  is 20-25%. No LV thrombus.  Severe global hypokinesia.  Elevated left atrial pressure>30mmHG.  Right Heart: Normal right atrium. Right venticle normal  size with severely reduced systolic function.  Normal  tricuspid valve.  Mild tricuspid regurgitation. Normal  pulmonic valve. Mild pulmonic regurgitation.  Pericardium/PleuraNormal pericardium with no pericardial  effusion.  Hemodynamic: Estimated right ventricular systolic pressure  equals 62 mm Hg, assuming right atrial pressure equals 10  mm Hg, consistent with severe pulmonary hypertension.  ------------------------------------------------------------------------  CONCLUSIONS:  Technically difficult study. Endocardial visualization  enhanced with intravenous injection of echo contrast  (Definity).  1. Mild left atrial enlargement.  2. Left ventricular ejection fraction, by visual  estimation, is 20-25%.  Severe global hypokinesia. No LV  thrombus.  3. Elevated left atrial pressure>30mmHG.  4. Normal right atrium.  5. Right venticle normal size with severely reduced  systolic function.  6. Pulmonary artery systolic pressure is 62 mmHG=severe  pulmonary hypertension.  7. No significant valvular abnormalities.  8. Normal pericardium with no pericardial effusion.    < end of copied text > INTERVAL HPI/OVERNIGHT EVENTS: Notified by RN that patient with increasing shortness of breath & tachypnea.  Assessed patient at bedside.  She continues to complain of chest pain and shortness of breath.  Her repeat EKG is unchanged from prior and without new ischemic changes.  Patient found to be hypotensive with SBP in 80's.  On exam, bibasilar crackles appreciated & patient tachypneic.  No pedal edema noted.  Called cardiology to evaluate.  Cardiology fellow & cardiology attending MD came to bedside.  Decision made to stop BB due to hypotension.  Patient with acute decompensated CHF now with hypotension.  Full set of repeat labs sent including cardiac enzymes.  As per cardiology attending Dr. Guerra, patient transferred to CCU for closer monitoring & diuresis.    SUBJECTIVE: Patient seen and examined at bedside. Patient currently complaining of nasal congestion and bilateral foot pain. Otherwise denies chest pain/shortness of breath/abdominal pain/nausea/vomiting/diarrhea.     ROS: otherwise negative    OBJECTIVE:    VITAL SIGNS:  ICU Vital Signs Last 24 Hrs  T(C): 37.2 (2019 13:34), Max: 37.2 (2019 13:34)  T(F): 98.9 (2019 13:34), Max: 98.9 (2019 13:34)  HR: 79 (2019 14:00) (74 - 87)  BP: 99/46 (2019 14:00) (88/46 - 119/93)  BP(mean): 63 (2019 14:00) (63 - 68)  RR: 19 (2019 14:00) (18 - 26)  SpO2: 100% (2019 14:00) (100% - 100%)     @ 07: @ 07:00  --------------------------------------------------------  IN: 330 mL / OUT: 600 mL / NET: -270 mL     @ 07: @ 14:41  --------------------------------------------------------  IN: 120 mL / OUT: 400 mL / NET: -280 mL    CAPILLARY BLOOD GLUCOSE    POCT Blood Glucose.: 202 mg/dL (2019 13:01)    PHYSICAL EXAM:  General: elderly female, appearing stated age. Breathing well    HEENT: normocephalic, atraumatic. Moist mucous membranes   Eyes: clear conjunctiva, PERRL  Neck: supple. JVD to mandible.   Respiratory: Vertical scar with keloid formation. Bibasilar crackles. Clear to auscultation anteriorly. Breathing well on 3L.   Cardiovascular: S1, S2. Regular rate and rhythm. No murmurs/rubs/gallops  Abdomen: soft, nondistended, nontender. +BS  Extremities: WWP, 2+ peripheral pulses b/l; no LE edema. Dorsum of feet tender to palpation bilaterally. Ankles, knees, shoulders, elbows, wrists nontender.   Skin: normal color and turgor; no rash  Neurological: A&Ox3     MEDICATIONS:  MEDICATIONS  (STANDING):  ALBUTerol/ipratropium for Nebulization 3 milliLiter(s) Nebulizer every 6 hours  aspirin enteric coated 81 milliGRAM(s) Oral daily  atorvastatin 40 milliGRAM(s) Oral at bedtime  dextrose 5%. 1000 milliLiter(s) (50 mL/Hr) IV Continuous <Continuous>  dextrose 50% Injectable 12.5 Gram(s) IV Push once  dextrose 50% Injectable 25 Gram(s) IV Push once  dextrose 50% Injectable 25 Gram(s) IV Push once  heparin  Injectable 5000 Unit(s) SubCutaneous every 8 hours  influenza   Vaccine 0.5 milliLiter(s) IntraMuscular once  insulin glargine Injectable (LANTUS) 65 Unit(s) SubCutaneous at bedtime  insulin lispro (HumaLOG) corrective regimen sliding scale   SubCutaneous three times a day before meals  insulin lispro (HumaLOG) corrective regimen sliding scale   SubCutaneous at bedtime  insulin lispro Injectable (HumaLOG) 30 Unit(s) SubCutaneous three times a day before meals  ticagrelor 90 milliGRAM(s) Oral two times a day    MEDICATIONS  (PRN):  acetaminophen   Tablet .. 650 milliGRAM(s) Oral every 6 hours PRN Mild Pain (1 - 3), Moderate Pain (4 - 6), Severe Pain (7 - 10)  ALBUTerol/ipratropium for Nebulization. 3 milliLiter(s) Nebulizer once PRN Shortness of Breath  dextrose 40% Gel 15 Gram(s) Oral once PRN Blood Glucose LESS THAN 70 milliGRAM(s)/deciliter  diphenhydrAMINE 25 milliGRAM(s) Oral every 4 hours PRN Rash and/or Itching  glucagon  Injectable 1 milliGRAM(s) IntraMuscular once PRN Glucose LESS THAN 70 milligrams/deciliter  ondansetron Injectable 4 milliGRAM(s) IV Push once PRN Nausea and/or Vomiting    ALLERGIES:  Allergies    azithromycin (Hives; Pruritus)    Intolerances    LABS:                        8.4    10.62 )-----------( 246      ( 2019 12:48 )             29.0         136  |  99  |  55<H>  ----------------------------<  266<H>  3.9   |  19<L>  |  1.92<H>    Ca    9.0      2019 12:48  Phos  3.1       Mg     2.1         TPro  7.6  /  Alb  3.9  /  TBili  0.3  /  DBili  x   /  AST  16  /  ALT  9   /  AlkPhos  66      PTT - ( 2019 06:30 )  PTT:79.7 SEC  Urinalysis Basic - ( 2019 09:30 )    Color: LIGHT YELLOW / Appearance: CLEAR / S.011 / pH: 6.0  Gluc: 70 / Ketone: NEGATIVE  / Bili: NEGATIVE / Urobili: NORMAL   Blood: NEGATIVE / Protein: NEGATIVE / Nitrite: NEGATIVE   Leuk Esterase: NEGATIVE / RBC: x / WBC x   Sq Epi: x / Non Sq Epi: x / Bacteria: x    RADIOLOGY & ADDITIONAL TESTS: Reviewed.    < from: TTE with Doppler (w/Cont) (19 @ 21:09) >  Ejection Fraction (Visual Estimate): 20-25 %  ------------------------------------------------------------------------  OBSERVATIONS:  Mitral Valve: Tethered mitral valve leaflets.  Moderate  mitral regurgitation.  Aortic Root: Normal aortic root.  Aortic Valve: Normal trileaflet aortic valve. No aortic  valve regurgitation seen.  Left Atrium: Mild left atrial enlargement.  Left Ventricle: Endocardial visualization enhanced with  intravenous injection of echo contrast (Definity).  Left ventricular ejection fraction, by visual estimation,  is 20-25%. No LV thrombus.  Severe global hypokinesia.  Elevated left atrial pressure>30mmHG.  Right Heart: Normal right atrium. Right venticle normal  size with severely reduced systolic function.  Normal  tricuspid valve.  Mild tricuspid regurgitation. Normal  pulmonic valve. Mild pulmonic regurgitation.  Pericardium/PleuraNormal pericardium with no pericardial  effusion.  Hemodynamic: Estimated right ventricular systolic pressure  equals 62 mm Hg, assuming right atrial pressure equals 10  mm Hg, consistent with severe pulmonary hypertension.  ------------------------------------------------------------------------  CONCLUSIONS:  Technically difficult study. Endocardial visualization  enhanced with intravenous injection of echo contrast  (Definity).  1. Mild left atrial enlargement.  2. Left ventricular ejection fraction, by visual  estimation, is 20-25%.  Severe global hypokinesia. No LV  thrombus.  3. Elevated left atrial pressure>30mmHG.  4. Normal right atrium.  5. Right venticle normal size with severely reduced  systolic function.  6. Pulmonary artery systolic pressure is 62 mmHG=severe  pulmonary hypertension.  7. No significant valvular abnormalities.  8. Normal pericardium with no pericardial effusion.    < end of copied text >

## 2019-04-26 NOTE — CONSULT NOTE ADULT - SUBJECTIVE AND OBJECTIVE BOX
Date of Admission: 4/25/19    CHIEF COMPLAINT: SOB     HISTORY OF PRESENT ILLNESS:    72 y/o female with pmhx of HTN, DM, CAD s/p CABG, HFrEF (LVEF 25%) severe MR, severe pulm HTN comes in with cough and SOB.   CT chest showed ground glass opacities, patient was started on IV zithromax, developed rash, now on Levaquin She was admitted to telemetry was given a dose of lopressor, patient became hypotensive and went into respiratory distress, patient was then moved to CCU. HF was consulted to help manage this patient who is SOB and is hypotensive. She admits to SOB at rest, orthopnea, cough. Denies fevers and sick contacts. No CP, palpitations, dizziness.       Allergies    azithromycin (Hives; Pruritus)      MEDICATIONS:  aspirin enteric coated 81 milliGRAM(s) Oral daily  heparin  Injectable 5000 Unit(s) SubCutaneous every 8 hours  ticagrelor 90 milliGRAM(s) Oral two times a day    levoFLOXacin  Tablet 750 milliGRAM(s) Oral every 48 hours    ALBUTerol/ipratropium for Nebulization 3 milliLiter(s) Nebulizer every 6 hours  ALBUTerol/ipratropium for Nebulization. 3 milliLiter(s) Nebulizer once PRN    acetaminophen   Tablet .. 650 milliGRAM(s) Oral every 6 hours PRN  diphenhydrAMINE 25 milliGRAM(s) Oral every 4 hours PRN  ondansetron Injectable 4 milliGRAM(s) IV Push once PRN      atorvastatin 40 milliGRAM(s) Oral at bedtime  dextrose 40% Gel 15 Gram(s) Oral once PRN  dextrose 50% Injectable 12.5 Gram(s) IV Push once  dextrose 50% Injectable 25 Gram(s) IV Push once  dextrose 50% Injectable 25 Gram(s) IV Push once  glucagon  Injectable 1 milliGRAM(s) IntraMuscular once PRN  insulin glargine Injectable (LANTUS) 65 Unit(s) SubCutaneous at bedtime  insulin lispro (HumaLOG) corrective regimen sliding scale   SubCutaneous three times a day before meals  insulin lispro (HumaLOG) corrective regimen sliding scale   SubCutaneous at bedtime  insulin lispro Injectable (HumaLOG) 30 Unit(s) SubCutaneous three times a day before meals  levothyroxine 50 MICROGram(s) Oral daily    chlorhexidine 4% Liquid 1 Application(s) Topical <User Schedule>  dextrose 5%. 1000 milliLiter(s) IV Continuous <Continuous>  influenza   Vaccine 0.5 milliLiter(s) IntraMuscular once  sodium chloride 0.65% Nasal 1 Spray(s) Both Nostrils three times a day      PAST MEDICAL & SURGICAL HISTORY:  GERD (gastroesophageal reflux disease)  CAD (coronary artery disease): s/p CABG  Hypothyroidism  Hyperlipidemia  Diabetes mellitus, type 2  S/P CABG (coronary artery bypass graft)      FAMILY HISTORY:  Family history of hypertension (Sibling): sister  Family history of diabetes mellitus (Sibling): brothers/sisters      SOCIAL HISTORY:        REVIEW OF SYSTEMS:  CONSTITUTIONAL: No fever, weight loss, admits to fatigue  EYES: No eye pain, visual disturbances, or discharge  ENMT:  No difficulty hearing, tinnitus, vertigo; No sinus or throat pain  NECK: No pain or stiffness  RESPIRATORY: No cough, wheezing, chills or hemoptysis; admits to Shortness of Breath  CARDIOVASCULAR: No chest pain, palpitations, passing out, dizziness, or leg swelling  GASTROINTESTINAL: No abdominal or epigastric pain. No nausea, vomiting, or hematemesis; No diarrhea or constipation. No melena or hematochezia.  GENITOURINARY: No dysuria, frequency, hematuria, or incontinence  NEUROLOGICAL: No headaches, memory loss, loss of strength, numbness, or tremors  SKIN: No itching, burning, rashes, or lesions   LYMPH Nodes: No enlarged glands  ENDOCRINE: No heat or cold intolerance; No hair loss  MUSCULOSKELETAL: No joint pain or swelling; No muscle, back, or extremity pain  PSYCHIATRIC: No depression, anxiety, mood swings, or difficulty sleeping  HEME/LYMPH: No easy bruising, or bleeding gums  ALLERY AND IMMUNOLOGIC: No hives or eczema	    [ ] All others negative	  [ ] Unable to obtain    PHYSICAL EXAM:  T(C): 36.9 (04-26-19 @ 16:00), Max: 37.2 (04-26-19 @ 13:34)  HR: 85 (04-26-19 @ 17:00) (73 - 87)  BP: 108/52 (04-26-19 @ 17:00) (88/46 - 119/93)  RR: 27 (04-26-19 @ 17:00) (18 - 28)  SpO2: 100% (04-26-19 @ 17:00) (100% - 100%)  Wt(kg): --  I&O's Summary    25 Apr 2019 07:01 - 26 Apr 2019 07:00  --------------------------------------------------------  IN: 330 mL / OUT: 600 mL / NET: -270 mL    26 Apr 2019 07:01 - 26 Apr 2019 17:37  --------------------------------------------------------  IN: 180 mL / OUT: 600 mL / NET: -420 mL        Appearance: Normal	  HEENT:   Normal oral mucosa, PERRL, EOMI	  Lymphatic: No lymphadenopathy  Cardiovascular: Normal S1 S2, mildly elevated JVD, No murmurs, No edema  Respiratory: Wheezing and faint crackles b/l   Psychiatry: A & O x 3, Mood & affect appropriate  Gastrointestinal:  Soft, Non-tender, + BS	  Skin: No rashes, No ecchymoses, No cyanosis	  Neurologic: Non-focal  Extremities: Normal range of motion, No clubbing, cyanosis or edema  Vascular: Peripheral pulses palpable 2+ bilaterally        LABS:	 	    CBC Full  -  ( 26 Apr 2019 16:20 )  WBC Count : 12.27 K/uL  Hemoglobin : 10.1 g/dL  Hematocrit : 33.4 %  Platelet Count - Automated : 282 K/uL  Mean Cell Volume : 95.7 fL  Mean Cell Hemoglobin : 28.9 pg  Mean Cell Hemoglobin Concentration : 30.2 %  Auto Neutrophil # : 9.25 K/uL  Auto Lymphocyte # : 1.88 K/uL  Auto Monocyte # : 0.81 K/uL  Auto Eosinophil # : 0.21 K/uL  Auto Basophil # : 0.03 K/uL  Auto Neutrophil % : 75.5 %  Auto Lymphocyte % : 15.3 %  Auto Monocyte % : 6.6 %  Auto Eosinophil % : 1.7 %  Auto Basophil % : 0.2 %    04-26    139  |  102  |  60<H>  ----------------------------<  155<H>  3.9   |  19<L>  |  2.06<H>  04-26    136  |  99  |  55<H>  ----------------------------<  266<H>  3.9   |  19<L>  |  1.92<H>    Ca    9.2      26 Apr 2019 16:20  Ca    9.0      26 Apr 2019 12:48  Phos  3.6     04-26  Phos  3.1     04-26  Mg     2.2     04-26  Mg     2.1     04-26    TPro  7.6  /  Alb  3.7  /  TBili  0.3  /  DBili  x   /  AST  17  /  ALT  11  /  AlkPhos  65  04-26  TPro  7.6  /  Alb  3.9  /  TBili  0.3  /  DBili  x   /  AST  16  /  ALT  9   /  AlkPhos  66  04-26      proBNP:   Lipid Profile:   HgA1c:   TSH:       CARDIAC MARKERS:      CKMB: 2.53 ng/mL (04-26 @ 12:48)  CKMB: 2.54 ng/mL (04-26 @ 08:45)  CKMB: 2.93 ng/mL (04-25 @ 20:12)  CKMB: 2.86 ng/mL (04-25 @ 16:00)  CKMB: 7.66 ng/mL (04-25 @ 12:01)  CKMB: 2.98 ng/mL (04-25 @ 09:23)        < from: Transthoracic Echocardiogram (04.26.19 @ 14:31) >  CONCLUSIONS:  Limited study to reevaluate RV/PASP  1. Tethered mitral valve leaflets. Severe mitral  regurgitation.  2. Severe left ventricular systolic dysfunction.  3. Normal right ventricular size with decreased right  ventricular systolic function.  4. Normal tricuspid valve.  Moderate-severe tricuspid  regurgitation.  5. Estimated pulmonary artery systolic pressure equals 70  mm Hg, assuming right atrial pressure equals 10  mm Hg,  consistent with severe pulmonary hypertension.    < end of copied text >

## 2019-04-26 NOTE — PROGRESS NOTE ADULT - SUBJECTIVE AND OBJECTIVE BOX
[facility] [place] [facility]   MD RAHEEL SHAH MD ANGELA WONG, PA    TEL:  OFFICE: [phone]  DR SANTOYO CELL: [phone]  DR. NGUYEN CELL: [phone]  UMM KENDALL CELL: [phone]        Patient is a 73y old  Female who presents with a chief complaint of sob (26 Apr 2019 14:41)      Patient seen and examined at bedside. + chest pain/sob    VITALS:  T(F): 98.9 (04-26-19 @ 13:34), Max: 98.9 (04-26-19 @ 13:34)  HR: 79 (04-26-19 @ 15:00)  BP: 93/50 (04-26-19 @ 15:00)  RR: 26 (04-26-19 @ 15:00)  SpO2: 100% (04-26-19 @ 15:00)  Wt(kg): --    04-25 @ 07:01  -  04-26 @ 07:00  --------------------------------------------------------  IN: 330 mL / OUT: 600 mL / NET: -270 mL    04-26 @ 07:01  -  04-26 @ 15:53  --------------------------------------------------------  IN: 120 mL / OUT: 500 mL / NET: -380 mL          PHYSICAL EXAM:  Constitutional: NAD  Neck: No JVD  Respiratory: b/l basilar crackles  Cardiovascular: S1, S2, RRR  Gastrointestinal: BS+, soft, NT/ND  Extremities: trace peripheral edema    Hospital Medications:   MEDICATIONS  (STANDING):  ALBUTerol/ipratropium for Nebulization 3 milliLiter(s) Nebulizer every 6 hours  aspirin enteric coated 81 milliGRAM(s) Oral daily  atorvastatin 40 milliGRAM(s) Oral at bedtime  chlorhexidine 4% Liquid 1 Application(s) Topical <User Schedule>  dextrose 5%. 1000 milliLiter(s) (50 mL/Hr) IV Continuous <Continuous>  dextrose 50% Injectable 12.5 Gram(s) IV Push once  dextrose 50% Injectable 25 Gram(s) IV Push once  dextrose 50% Injectable 25 Gram(s) IV Push once  heparin  Injectable 5000 Unit(s) SubCutaneous every 8 hours  influenza   Vaccine 0.5 milliLiter(s) IntraMuscular once  insulin glargine Injectable (LANTUS) 65 Unit(s) SubCutaneous at bedtime  insulin lispro (HumaLOG) corrective regimen sliding scale   SubCutaneous three times a day before meals  insulin lispro (HumaLOG) corrective regimen sliding scale   SubCutaneous at bedtime  insulin lispro Injectable (HumaLOG) 30 Unit(s) SubCutaneous three times a day before meals  levothyroxine 50 MICROGram(s) Oral daily  sodium chloride 0.65% Nasal 1 Spray(s) Both Nostrils three times a day  ticagrelor 90 milliGRAM(s) Oral two times a day      LABS:  04-26    136  |  99  |  55<H>  ----------------------------<  266<H>  3.9   |  19<L>  |  1.92<H>    Ca    9.0      26 Apr 2019 12:48  Phos  3.1     04-26  Mg     2.1     04-26    TPro  7.6  /  Alb  3.9  /  TBili  0.3  /  DBili      /  AST  16  /  ALT  9   /  AlkPhos  66  04-26    Creatinine Trend: 1.92 <--, 1.89 <--, 1.91 <--, 2.21 <--    Phosphorus Level, Serum: 3.1 mg/dL (04-26 @ 12:48)  Albumin, Serum: 3.9 g/dL (04-26 @ 12:48)                              8.4    10.62 )-----------( 246      ( 26 Apr 2019 12:48 )             29.0     Urine Studies:  Urinalysis - [04-25-19 @ 09:30]      Color LIGHT YELLOW / Appearance CLEAR / SG 1.011 / pH 6.0      Gluc 70 / Ketone NEGATIVE  / Bili NEGATIVE / Urobili NORMAL       Blood NEGATIVE / Protein NEGATIVE / Leuk Est NEGATIVE / Nitrite NEGATIVE      RBC  / WBC  / Hyaline  / Gran  / Sq Epi  / Non Sq Epi  / Bacteria       .4 (Ca --)      [04-25-19 @ 05:45]   --  PTH 43.55 (Ca --)      [09-10-18 @ 07:15]   --  PTH 36.60 (Ca --)      [09-08-18 @ 03:15]   --  Vitamin D (25OH) 15.8      [09-08-18 @ 03:15]  HbA1c 7.3      [04-25-19 @ 05:45]  TSH 1.59      [04-25-19 @ 05:45]  Lipid: chol 127, , HDL 22, LDL 67      [04-24-19 @ 12:21]    HCV 0.06, Nonreactive Hepatitis C AB  S/CO Ratio                        Interpretation  < 1.00                                   Non-Reactive  1.00 - 4.99                         Weakly-Reactive  > 5.00                                Reactive  Non-Reactive: A person with a non-reactive HCV antibody  result is considered uninfected.  No further action is  needed unless recent infection is suspected.  In these  cases, consider repeat testing later to detect  seroconversion..  Weakly-Reactive: HCV antibody test is abnormal, HCV RNA  Qualitative test will follow.  Reactive: HCV antibody test is abnormal, HCV RNA  Qualitative test will follow.  Note: HCV antibody testing is performed on the Abbott   system.      [04-25-19 @ 05:45]      RADIOLOGY & ADDITIONAL STUDIES: [facility] [place] [facility]   MD RAHEEL SHAH MD ANGELA WONG, PA    TEL:  OFFICE: [phone]  DR SANTOYO CELL: [phone]  DR. NGUYEN CELL: [phone]  UMM KENDALL CELL: [phone]        Patient is a 73y old  Female who presents with a chief complaint of sob (26 Apr 2019 14:41)      Patient seen and examined at bedside. + chest pain +sob    VITALS:  T(F): 98.9 (04-26-19 @ 13:34), Max: 98.9 (04-26-19 @ 13:34)  HR: 79 (04-26-19 @ 15:00)  BP: 93/50 (04-26-19 @ 15:00)  RR: 26 (04-26-19 @ 15:00)  SpO2: 100% (04-26-19 @ 15:00)  Wt(kg): --    04-25 @ 07:01  -  04-26 @ 07:00  --------------------------------------------------------  IN: 330 mL / OUT: 600 mL / NET: -270 mL    04-26 @ 07:01  -  04-26 @ 15:53  --------------------------------------------------------  IN: 120 mL / OUT: 500 mL / NET: -380 mL          PHYSICAL EXAM:  Constitutional: NAD  Neck: No JVD  Respiratory: b/l basilar crackles  Cardiovascular: S1, S2, RRR  Gastrointestinal: BS+, soft, NT/ND  Extremities: trace peripheral edema    Hospital Medications:   MEDICATIONS  (STANDING):  ALBUTerol/ipratropium for Nebulization 3 milliLiter(s) Nebulizer every 6 hours  aspirin enteric coated 81 milliGRAM(s) Oral daily  atorvastatin 40 milliGRAM(s) Oral at bedtime  chlorhexidine 4% Liquid 1 Application(s) Topical <User Schedule>  dextrose 5%. 1000 milliLiter(s) (50 mL/Hr) IV Continuous <Continuous>  dextrose 50% Injectable 12.5 Gram(s) IV Push once  dextrose 50% Injectable 25 Gram(s) IV Push once  dextrose 50% Injectable 25 Gram(s) IV Push once  heparin  Injectable 5000 Unit(s) SubCutaneous every 8 hours  influenza   Vaccine 0.5 milliLiter(s) IntraMuscular once  insulin glargine Injectable (LANTUS) 65 Unit(s) SubCutaneous at bedtime  insulin lispro (HumaLOG) corrective regimen sliding scale   SubCutaneous three times a day before meals  insulin lispro (HumaLOG) corrective regimen sliding scale   SubCutaneous at bedtime  insulin lispro Injectable (HumaLOG) 30 Unit(s) SubCutaneous three times a day before meals  levothyroxine 50 MICROGram(s) Oral daily  sodium chloride 0.65% Nasal 1 Spray(s) Both Nostrils three times a day  ticagrelor 90 milliGRAM(s) Oral two times a day      LABS:  04-26    136  |  99  |  55<H>  ----------------------------<  266<H>  3.9   |  19<L>  |  1.92<H>    Ca    9.0      26 Apr 2019 12:48  Phos  3.1     04-26  Mg     2.1     04-26    TPro  7.6  /  Alb  3.9  /  TBili  0.3  /  DBili      /  AST  16  /  ALT  9   /  AlkPhos  66  04-26    Creatinine Trend: 1.92 <--, 1.89 <--, 1.91 <--, 2.21 <--    Phosphorus Level, Serum: 3.1 mg/dL (04-26 @ 12:48)  Albumin, Serum: 3.9 g/dL (04-26 @ 12:48)                              8.4    10.62 )-----------( 246      ( 26 Apr 2019 12:48 )             29.0     Urine Studies:  Urinalysis - [04-25-19 @ 09:30]      Color LIGHT YELLOW / Appearance CLEAR / SG 1.011 / pH 6.0      Gluc 70 / Ketone NEGATIVE  / Bili NEGATIVE / Urobili NORMAL       Blood NEGATIVE / Protein NEGATIVE / Leuk Est NEGATIVE / Nitrite NEGATIVE      RBC  / WBC  / Hyaline  / Gran  / Sq Epi  / Non Sq Epi  / Bacteria       .4 (Ca --)      [04-25-19 @ 05:45]   --  PTH 43.55 (Ca --)      [09-10-18 @ 07:15]   --  PTH 36.60 (Ca --)      [09-08-18 @ 03:15]   --  Vitamin D (25OH) 15.8      [09-08-18 @ 03:15]  HbA1c 7.3      [04-25-19 @ 05:45]  TSH 1.59      [04-25-19 @ 05:45]  Lipid: chol 127, , HDL 22, LDL 67      [04-24-19 @ 12:21]    HCV 0.06, Nonreactive Hepatitis C AB  S/CO Ratio                        Interpretation  < 1.00                                   Non-Reactive  1.00 - 4.99                         Weakly-Reactive  > 5.00                                Reactive  Non-Reactive: A person with a non-reactive HCV antibody  result is considered uninfected.  No further action is  needed unless recent infection is suspected.  In these  cases, consider repeat testing later to detect  seroconversion..  Weakly-Reactive: HCV antibody test is abnormal, HCV RNA  Qualitative test will follow.  Reactive: HCV antibody test is abnormal, HCV RNA  Qualitative test will follow.  Note: HCV antibody testing is performed on the Abbott   system.      [04-25-19 @ 05:45]      RADIOLOGY & ADDITIONAL STUDIES:

## 2019-04-26 NOTE — PROGRESS NOTE ADULT - ATTENDING COMMENTS
Patient transferred to CCU for tachypnea, SBPs 80s  Hx of ischemic severe LV dysfunction, CABG, ramus PCI 9/2018   Repeat echo today showing severe MR (tethered) with severe pulm HTN, decreased RV function, moderate to severe TR  BP improved, MAP 67 currently, appreciate HF rec, trial Lasix IVP, if tolerating will start low dose Afterload reduction

## 2019-04-26 NOTE — CHART NOTE - NSCHARTNOTEFT_GEN_A_CORE
Notified by RN that patient with increasing shortness of breath & tachypnea.  Assessed patient at bedside.  She continues to complain of chest pain and shortness of breath.  Her repeat EKG is unchanged from prior and without new ischemic changes.  Patient found to be hypotensive with SBP in 80's.  On exam, bibasilar crackles appreciated & patient tachypneic.  No pedal edema noted.  Called cardiology to evaluate.  Cardiology fellow & cardiology attending MD came to bedside.  Decision made to stop BB due to hypotension.  Patient with acute decompensated CHF now with hypotension.  Full set of repeat labs sent including cardiac enzymes.  As per cardiology attending Dr. Guerra, patient transferred to CCU for closer monitoring & diuresis.  Attending MD Dr. Velarde made aware.

## 2019-04-27 DIAGNOSIS — J18.9 PNEUMONIA, UNSPECIFIED ORGANISM: ICD-10-CM

## 2019-04-27 LAB
ALBUMIN SERPL ELPH-MCNC: 3.7 G/DL — SIGNIFICANT CHANGE UP (ref 3.3–5)
ALBUMIN SERPL ELPH-MCNC: 4.1 G/DL — SIGNIFICANT CHANGE UP (ref 3.3–5)
ALP SERPL-CCNC: 61 U/L — SIGNIFICANT CHANGE UP (ref 40–120)
ALP SERPL-CCNC: 63 U/L — SIGNIFICANT CHANGE UP (ref 40–120)
ALT FLD-CCNC: 8 U/L — SIGNIFICANT CHANGE UP (ref 4–33)
ALT FLD-CCNC: 9 U/L — SIGNIFICANT CHANGE UP (ref 4–33)
ANION GAP SERPL CALC-SCNC: 17 MMO/L — HIGH (ref 7–14)
ANION GAP SERPL CALC-SCNC: 18 MMO/L — HIGH (ref 7–14)
AST SERPL-CCNC: 14 U/L — SIGNIFICANT CHANGE UP (ref 4–32)
AST SERPL-CCNC: 14 U/L — SIGNIFICANT CHANGE UP (ref 4–32)
BILIRUB SERPL-MCNC: 0.3 MG/DL — SIGNIFICANT CHANGE UP (ref 0.2–1.2)
BILIRUB SERPL-MCNC: 0.3 MG/DL — SIGNIFICANT CHANGE UP (ref 0.2–1.2)
BUN SERPL-MCNC: 56 MG/DL — HIGH (ref 7–23)
BUN SERPL-MCNC: 57 MG/DL — HIGH (ref 7–23)
CALCIUM SERPL-MCNC: 8.8 MG/DL — SIGNIFICANT CHANGE UP (ref 8.4–10.5)
CALCIUM SERPL-MCNC: 9.5 MG/DL — SIGNIFICANT CHANGE UP (ref 8.4–10.5)
CHLORIDE SERPL-SCNC: 103 MMOL/L — SIGNIFICANT CHANGE UP (ref 98–107)
CHLORIDE SERPL-SCNC: 104 MMOL/L — SIGNIFICANT CHANGE UP (ref 98–107)
CO2 SERPL-SCNC: 19 MMOL/L — LOW (ref 22–31)
CO2 SERPL-SCNC: 20 MMOL/L — LOW (ref 22–31)
CREAT SERPL-MCNC: 1.97 MG/DL — HIGH (ref 0.5–1.3)
CREAT SERPL-MCNC: 2.06 MG/DL — HIGH (ref 0.5–1.3)
GLUCOSE SERPL-MCNC: 167 MG/DL — HIGH (ref 70–99)
GLUCOSE SERPL-MCNC: 181 MG/DL — HIGH (ref 70–99)
HCT VFR BLD CALC: 29.7 % — LOW (ref 34.5–45)
HGB BLD-MCNC: 9.1 G/DL — LOW (ref 11.5–15.5)
L PNEUMO AG UR QL: NEGATIVE — SIGNIFICANT CHANGE UP
LACTATE SERPL-SCNC: 2 MMOL/L — SIGNIFICANT CHANGE UP (ref 0.5–2)
MAGNESIUM SERPL-MCNC: 2.1 MG/DL — SIGNIFICANT CHANGE UP (ref 1.6–2.6)
MAGNESIUM SERPL-MCNC: 2.3 MG/DL — SIGNIFICANT CHANGE UP (ref 1.6–2.6)
MCHC RBC-ENTMCNC: 29.1 PG — SIGNIFICANT CHANGE UP (ref 27–34)
MCHC RBC-ENTMCNC: 30.6 % — LOW (ref 32–36)
MCV RBC AUTO: 94.9 FL — SIGNIFICANT CHANGE UP (ref 80–100)
NRBC # FLD: 0 K/UL — SIGNIFICANT CHANGE UP (ref 0–0)
PHOSPHATE SERPL-MCNC: 3.2 MG/DL — SIGNIFICANT CHANGE UP (ref 2.5–4.5)
PHOSPHATE SERPL-MCNC: 3.3 MG/DL — SIGNIFICANT CHANGE UP (ref 2.5–4.5)
PLATELET # BLD AUTO: 301 K/UL — SIGNIFICANT CHANGE UP (ref 150–400)
PMV BLD: 9.5 FL — SIGNIFICANT CHANGE UP (ref 7–13)
POTASSIUM SERPL-MCNC: 3.6 MMOL/L — SIGNIFICANT CHANGE UP (ref 3.5–5.3)
POTASSIUM SERPL-MCNC: 3.8 MMOL/L — SIGNIFICANT CHANGE UP (ref 3.5–5.3)
POTASSIUM SERPL-SCNC: 3.6 MMOL/L — SIGNIFICANT CHANGE UP (ref 3.5–5.3)
POTASSIUM SERPL-SCNC: 3.8 MMOL/L — SIGNIFICANT CHANGE UP (ref 3.5–5.3)
PROT SERPL-MCNC: 7.4 G/DL — SIGNIFICANT CHANGE UP (ref 6–8.3)
PROT SERPL-MCNC: 7.9 G/DL — SIGNIFICANT CHANGE UP (ref 6–8.3)
RBC # BLD: 3.13 M/UL — LOW (ref 3.8–5.2)
RBC # FLD: 16.9 % — HIGH (ref 10.3–14.5)
SODIUM SERPL-SCNC: 140 MMOL/L — SIGNIFICANT CHANGE UP (ref 135–145)
SODIUM SERPL-SCNC: 141 MMOL/L — SIGNIFICANT CHANGE UP (ref 135–145)
WBC # BLD: 11.57 K/UL — HIGH (ref 3.8–10.5)
WBC # FLD AUTO: 11.57 K/UL — HIGH (ref 3.8–10.5)

## 2019-04-27 PROCEDURE — 73630 X-RAY EXAM OF FOOT: CPT | Mod: 26,50

## 2019-04-27 RX ORDER — SENNA PLUS 8.6 MG/1
2 TABLET ORAL AT BEDTIME
Qty: 0 | Refills: 0 | Status: DISCONTINUED | OUTPATIENT
Start: 2019-04-27 | End: 2019-05-16

## 2019-04-27 RX ORDER — HYDRALAZINE HCL 50 MG
5 TABLET ORAL EVERY 8 HOURS
Qty: 0 | Refills: 0 | Status: DISCONTINUED | OUTPATIENT
Start: 2019-04-27 | End: 2019-04-27

## 2019-04-27 RX ORDER — OXYCODONE AND ACETAMINOPHEN 5; 325 MG/1; MG/1
1 TABLET ORAL ONCE
Qty: 0 | Refills: 0 | Status: DISCONTINUED | OUTPATIENT
Start: 2019-04-27 | End: 2019-04-27

## 2019-04-27 RX ORDER — HYDRALAZINE HCL 50 MG
10 TABLET ORAL EVERY 8 HOURS
Qty: 0 | Refills: 0 | Status: DISCONTINUED | OUTPATIENT
Start: 2019-04-27 | End: 2019-04-30

## 2019-04-27 RX ORDER — DOCUSATE SODIUM 100 MG
100 CAPSULE ORAL
Qty: 0 | Refills: 0 | Status: DISCONTINUED | OUTPATIENT
Start: 2019-04-27 | End: 2019-05-16

## 2019-04-27 RX ORDER — POTASSIUM CHLORIDE 20 MEQ
40 PACKET (EA) ORAL ONCE
Qty: 0 | Refills: 0 | Status: COMPLETED | OUTPATIENT
Start: 2019-04-27 | End: 2019-04-27

## 2019-04-27 RX ORDER — FUROSEMIDE 40 MG
60 TABLET ORAL ONCE
Qty: 0 | Refills: 0 | Status: COMPLETED | OUTPATIENT
Start: 2019-04-27 | End: 2019-04-27

## 2019-04-27 RX ORDER — ACETAMINOPHEN 500 MG
650 TABLET ORAL ONCE
Qty: 0 | Refills: 0 | Status: COMPLETED | OUTPATIENT
Start: 2019-04-27 | End: 2019-04-27

## 2019-04-27 RX ADMIN — Medication 650 MILLIGRAM(S): at 12:00

## 2019-04-27 RX ADMIN — Medication 3 MILLILITER(S): at 10:27

## 2019-04-27 RX ADMIN — Medication 650 MILLIGRAM(S): at 21:26

## 2019-04-27 RX ADMIN — Medication 40 MILLIEQUIVALENT(S): at 05:38

## 2019-04-27 RX ADMIN — Medication 50 MICROGRAM(S): at 05:39

## 2019-04-27 RX ADMIN — CHLORHEXIDINE GLUCONATE 1 APPLICATION(S): 213 SOLUTION TOPICAL at 11:12

## 2019-04-27 RX ADMIN — Medication 3 MILLILITER(S): at 22:33

## 2019-04-27 RX ADMIN — TICAGRELOR 90 MILLIGRAM(S): 90 TABLET ORAL at 05:39

## 2019-04-27 RX ADMIN — OXYCODONE AND ACETAMINOPHEN 1 TABLET(S): 5; 325 TABLET ORAL at 22:40

## 2019-04-27 RX ADMIN — SENNA PLUS 2 TABLET(S): 8.6 TABLET ORAL at 21:24

## 2019-04-27 RX ADMIN — Medication 1 SPRAY(S): at 05:39

## 2019-04-27 RX ADMIN — Medication 1 SPRAY(S): at 21:25

## 2019-04-27 RX ADMIN — Medication 3 MILLILITER(S): at 15:46

## 2019-04-27 RX ADMIN — Medication 10 MILLIGRAM(S): at 21:24

## 2019-04-27 RX ADMIN — ATORVASTATIN CALCIUM 40 MILLIGRAM(S): 80 TABLET, FILM COATED ORAL at 21:23

## 2019-04-27 RX ADMIN — Medication 30 UNIT(S): at 08:11

## 2019-04-27 RX ADMIN — Medication 5 MILLIGRAM(S): at 05:39

## 2019-04-27 RX ADMIN — HEPARIN SODIUM 5000 UNIT(S): 5000 INJECTION INTRAVENOUS; SUBCUTANEOUS at 21:23

## 2019-04-27 RX ADMIN — Medication 650 MILLIGRAM(S): at 17:57

## 2019-04-27 RX ADMIN — Medication 30 UNIT(S): at 12:41

## 2019-04-27 RX ADMIN — HEPARIN SODIUM 5000 UNIT(S): 5000 INJECTION INTRAVENOUS; SUBCUTANEOUS at 14:24

## 2019-04-27 RX ADMIN — Medication 1 SPRAY(S): at 14:27

## 2019-04-27 RX ADMIN — HEPARIN SODIUM 5000 UNIT(S): 5000 INJECTION INTRAVENOUS; SUBCUTANEOUS at 05:38

## 2019-04-27 RX ADMIN — Medication 650 MILLIGRAM(S): at 11:58

## 2019-04-27 RX ADMIN — Medication 1: at 17:38

## 2019-04-27 RX ADMIN — INSULIN GLARGINE 65 UNIT(S): 100 INJECTION, SOLUTION SUBCUTANEOUS at 21:24

## 2019-04-27 RX ADMIN — TICAGRELOR 90 MILLIGRAM(S): 90 TABLET ORAL at 17:38

## 2019-04-27 RX ADMIN — Medication 650 MILLIGRAM(S): at 11:11

## 2019-04-27 RX ADMIN — Medication 30 UNIT(S): at 17:39

## 2019-04-27 RX ADMIN — Medication 650 MILLIGRAM(S): at 21:27

## 2019-04-27 RX ADMIN — Medication 60 MILLIGRAM(S): at 09:42

## 2019-04-27 RX ADMIN — Medication 81 MILLIGRAM(S): at 11:12

## 2019-04-27 RX ADMIN — Medication 3 MILLILITER(S): at 04:29

## 2019-04-27 NOTE — PROGRESS NOTE ADULT - PROBLEM SELECTOR PLAN 1
- lasix IV prn.  - On TTE, Severe LV dysfunction. Severe pulm HTN. mod-severe MR.   - hold beta blockers in the setting of acute decompensated heart failure  - monitor strict I/O; keep net negative 1L   - No ACE/ARB given MERVIN   - started on hydralazine 5 mg q8h (hold for SBP <90) for afterload reduction   - c/w Dayne - lasix IV prn. (s/p total lasix 100mg IV yesterday and slightly overdiuresed); will given lasix 60 IVP now   - On TTE, Severe LV dysfunction. Severe pulm HTN. mod-severe MR.   - hold beta blockers in the setting of acute decompensated heart failure  - monitor strict I/O; keep net negative 1L   - No ACE/ARB given MERVIN   - started on hydralazine 5 mg q8h (hold for SBP <90) for afterload reduction; will increase to TID at next dose    - c/w Dayne

## 2019-04-27 NOTE — PROGRESS NOTE ADULT - PROBLEM SELECTOR PLAN 6
- patient with CKD III, Baseline Scr 1.5-1.8  - presented with Scr 2.21, currently 1.97  - continue to diurese as tolerated   - daily BMPs. Daily weights    - nephro following   - avoid nephrotoxins

## 2019-04-27 NOTE — PROGRESS NOTE ADULT - PROBLEM SELECTOR PLAN 1
4/27 CT chest noted. audible crackles bilateral bases monitor I&O, keep negative.  continue diuretics.

## 2019-04-27 NOTE — PROGRESS NOTE ADULT - SUBJECTIVE AND OBJECTIVE BOX
PATIENT:  SELVIN CLAIRE  [id number]    CHIEF COMPLAINT:  Patient is a 73y old  Female who presents with a chief complaint of sob (2019 17:37)      INTERVAL HISTORY/OVERNIGHT EVENTS: Patient was started on hydralazine overnight. Patient's BPs remained in the 110s, HR 80s. No acute events overnight. Patient seen and examined at bedside.      MEDICATIONS:  MEDICATIONS  (STANDING):  ALBUTerol/ipratropium for Nebulization 3 milliLiter(s) Nebulizer every 6 hours  aspirin enteric coated 81 milliGRAM(s) Oral daily  atorvastatin 40 milliGRAM(s) Oral at bedtime  chlorhexidine 4% Liquid 1 Application(s) Topical <User Schedule>  dextrose 5%. 1000 milliLiter(s) (50 mL/Hr) IV Continuous <Continuous>  dextrose 50% Injectable 12.5 Gram(s) IV Push once  dextrose 50% Injectable 25 Gram(s) IV Push once  dextrose 50% Injectable 25 Gram(s) IV Push once  heparin  Injectable 5000 Unit(s) SubCutaneous every 8 hours  hydrALAZINE 5 milliGRAM(s) Oral every 8 hours  influenza   Vaccine 0.5 milliLiter(s) IntraMuscular once  insulin glargine Injectable (LANTUS) 65 Unit(s) SubCutaneous at bedtime  insulin lispro (HumaLOG) corrective regimen sliding scale   SubCutaneous three times a day before meals  insulin lispro (HumaLOG) corrective regimen sliding scale   SubCutaneous at bedtime  insulin lispro Injectable (HumaLOG) 30 Unit(s) SubCutaneous three times a day before meals  levothyroxine 50 MICROGram(s) Oral daily  sodium chloride 0.65% Nasal 1 Spray(s) Both Nostrils three times a day  ticagrelor 90 milliGRAM(s) Oral two times a day    MEDICATIONS  (PRN):  acetaminophen   Tablet .. 650 milliGRAM(s) Oral every 6 hours PRN Mild Pain (1 - 3), Moderate Pain (4 - 6), Severe Pain (7 - 10)  ALBUTerol/ipratropium for Nebulization. 3 milliLiter(s) Nebulizer once PRN Shortness of Breath  dextrose 40% Gel 15 Gram(s) Oral once PRN Blood Glucose LESS THAN 70 milliGRAM(s)/deciliter  diphenhydrAMINE 25 milliGRAM(s) Oral every 4 hours PRN Rash and/or Itching  glucagon  Injectable 1 milliGRAM(s) IntraMuscular once PRN Glucose LESS THAN 70 milligrams/deciliter  ondansetron Injectable 4 milliGRAM(s) IV Push once PRN Nausea and/or Vomiting      ALLERGIES:  Allergies    azithromycin (Hives; Pruritus)    Intolerances        OBJECTIVE:  ICU Vital Signs Last 24 Hrs  T(C): 36.8 (2019 04:00), Max: 37.2 (2019 13:34)  T(F): 98.3 (2019 04:00), Max: 98.9 (2019 13:34)  HR: 87 (2019 05:00) (73 - 102)  BP: 104/53 (2019 05:00) (88/46 - 128/56)  BP(mean): 69 (2019 05:00) (63 - 94)  ABP: --  ABP(mean): --  RR: 26 (2019 05:00) (18 - 33)  SpO2: 99% (2019 05:00) (99% - 100%)      Adult Advanced Hemodynamics Last 24 Hrs  CVP(mm Hg): --  CVP(cm H2O): --  CO: --  CI: --  PA: --  PA(mean): --  PCWP: --  SVR: --  SVRI: --  PVR: --  PVRI: --  CAPILLARY BLOOD GLUCOSE      POCT Blood Glucose.: 141 mg/dL (2019 22:14)  POCT Blood Glucose.: 133 mg/dL (2019 16:55)  POCT Blood Glucose.: 202 mg/dL (2019 13:01)  POCT Blood Glucose.: 129 mg/dL (2019 08:44)    CAPILLARY BLOOD GLUCOSE      POCT Blood Glucose.: 141 mg/dL (2019 22:14)    I&O's Summary    2019 07:01  -  2019 07:00  --------------------------------------------------------  IN: 240 mL / OUT: 1560 mL / NET: -1320 mL      Daily     Daily Weight in k (2019 04:00)    PHYSICAL EXAMINATION:  General: NAD, appears stated age   HEENT: PERRLA, EOMI, moist mucous membranes  Neurology: A&Ox3, nonfocal, CUADRA x 4  Respiratory: Bibasilar crackles, on 3L NC   CV: RRR, S1S2, no murmurs, rubs or gallops  Abdominal: Soft, NT, ND +BS, Last BM  Extremities: No edema, + peripheral pulses  Skin: normal color and turgor; no rash  Incisions:   Tubes:    LABS:  ABG - ( 2019 16:20 )  pH, Arterial: 7.44  pH, Blood: x     /  pCO2: 30    /  pO2: 125   / HCO3: 22    / Base Excess: -3.6  /  SaO2: 97.5                                    9.1    11.57 )-----------( 301      ( 2019 03:12 )             29.7         140  |  104  |  56<H>  ----------------------------<  181<H>  3.6   |  19<L>  |  1.97<H>    Ca    8.8      2019 03:12  Phos  3.2       Mg     2.1         TPro  7.4  /  Alb  3.7  /  TBili  0.3  /  DBili  x   /  AST  14  /  ALT  9   /  AlkPhos  61      LIVER FUNCTIONS - ( 2019 03:12 )  Alb: 3.7 g/dL / Pro: 7.4 g/dL / ALK PHOS: 61 u/L / ALT: 9 u/L / AST: 14 u/L / GGT: x           PT/INR - ( 2019 16:30 )   PT: 12.4 SEC;   INR: 1.08          PTT - ( 2019 16:30 )  PTT:34.2 SEC  CKMB: 2.53 ng/mL ( @ 12:48)  Creatine Kinase, Serum: 76 u/L ( @ 12:48)  CKMB Relative Index: Test not performed ( @ 12:48)  Creatine Kinase, Serum: 82 u/L ( @ 08:45)  CKMB: 2.54 ng/mL ( @ 08:45)  CKMB Relative Index: Test not performed ( @ 08:45)    CARDIAC MARKERS ( 2019 12:48 )  x     / x     / 76 u/L / 2.53 ng/mL / x      CARDIAC MARKERS ( 2019 08:45 )  x     / x     / 82 u/L / 2.54 ng/mL / x      CARDIAC MARKERS ( 2019 20:12 )  x     / x     / 75 u/L / 2.93 ng/mL / x      CARDIAC MARKERS ( 2019 16:00 )  x     / x     / 71 u/L / 2.86 ng/mL / x      CARDIAC MARKERS ( 2019 12:01 )  x     / x     / 96 u/L / 7.66 ng/mL / x      CARDIAC MARKERS ( 2019 09:23 )  x     / x     / 73 u/L / 2.98 ng/mL / x          Urinalysis Basic - ( 2019 09:30 )    Color: LIGHT YELLOW / Appearance: CLEAR / S.011 / pH: 6.0  Gluc: 70 / Ketone: NEGATIVE  / Bili: NEGATIVE / Urobili: NORMAL   Blood: NEGATIVE / Protein: NEGATIVE / Nitrite: NEGATIVE   Leuk Esterase: NEGATIVE / RBC: x / WBC x   Sq Epi: x / Non Sq Epi: x / Bacteria: x        TELEMETRY:     EKG:     IMAGING: PATIENT:  SELVIN CLAIRE  [id number]    CHIEF COMPLAINT:  Patient is a 73y old  Female who presents with a chief complaint of sob (2019 17:37)      INTERVAL HISTORY/OVERNIGHT EVENTS: Patient was started on hydralazine overnight. Patient's BPs remained in the 110s, HR 80s. No acute events overnight. Patient seen and examined at bedside. patient continues to complain of B/L foot pain at the dorsum, unchanged from yesterday. Patient also has SOB though improved from yesterday      MEDICATIONS:  MEDICATIONS  (STANDING):  ALBUTerol/ipratropium for Nebulization 3 milliLiter(s) Nebulizer every 6 hours  aspirin enteric coated 81 milliGRAM(s) Oral daily  atorvastatin 40 milliGRAM(s) Oral at bedtime  chlorhexidine 4% Liquid 1 Application(s) Topical <User Schedule>  dextrose 5%. 1000 milliLiter(s) (50 mL/Hr) IV Continuous <Continuous>  dextrose 50% Injectable 12.5 Gram(s) IV Push once  dextrose 50% Injectable 25 Gram(s) IV Push once  dextrose 50% Injectable 25 Gram(s) IV Push once  heparin  Injectable 5000 Unit(s) SubCutaneous every 8 hours  hydrALAZINE 5 milliGRAM(s) Oral every 8 hours  influenza   Vaccine 0.5 milliLiter(s) IntraMuscular once  insulin glargine Injectable (LANTUS) 65 Unit(s) SubCutaneous at bedtime  insulin lispro (HumaLOG) corrective regimen sliding scale   SubCutaneous three times a day before meals  insulin lispro (HumaLOG) corrective regimen sliding scale   SubCutaneous at bedtime  insulin lispro Injectable (HumaLOG) 30 Unit(s) SubCutaneous three times a day before meals  levothyroxine 50 MICROGram(s) Oral daily  sodium chloride 0.65% Nasal 1 Spray(s) Both Nostrils three times a day  ticagrelor 90 milliGRAM(s) Oral two times a day    MEDICATIONS  (PRN):  acetaminophen   Tablet .. 650 milliGRAM(s) Oral every 6 hours PRN Mild Pain (1 - 3), Moderate Pain (4 - 6), Severe Pain (7 - 10)  ALBUTerol/ipratropium for Nebulization. 3 milliLiter(s) Nebulizer once PRN Shortness of Breath  dextrose 40% Gel 15 Gram(s) Oral once PRN Blood Glucose LESS THAN 70 milliGRAM(s)/deciliter  diphenhydrAMINE 25 milliGRAM(s) Oral every 4 hours PRN Rash and/or Itching  glucagon  Injectable 1 milliGRAM(s) IntraMuscular once PRN Glucose LESS THAN 70 milligrams/deciliter  ondansetron Injectable 4 milliGRAM(s) IV Push once PRN Nausea and/or Vomiting      ALLERGIES:  Allergies    azithromycin (Hives; Pruritus)    Intolerances        OBJECTIVE:  ICU Vital Signs Last 24 Hrs  T(C): 36.8 (2019 04:00), Max: 37.2 (2019 13:34)  T(F): 98.3 (2019 04:00), Max: 98.9 (2019 13:34)  HR: 87 (2019 05:00) (73 - 102)  BP: 104/53 (2019 05:00) (88/46 - 128/56)  BP(mean): 69 (2019 05:00) (63 - 94)  ABP: --  ABP(mean): --  RR: 26 (2019 05:00) (18 - 33)  SpO2: 99% (2019 05:00) (99% - 100%)      Adult Advanced Hemodynamics Last 24 Hrs  CVP(mm Hg): --  CVP(cm H2O): --  CO: --  CI: --  PA: --  PA(mean): --  PCWP: --  SVR: --  SVRI: --  PVR: --  PVRI: --  CAPILLARY BLOOD GLUCOSE      POCT Blood Glucose.: 141 mg/dL (2019 22:14)  POCT Blood Glucose.: 133 mg/dL (2019 16:55)  POCT Blood Glucose.: 202 mg/dL (2019 13:01)  POCT Blood Glucose.: 129 mg/dL (2019 08:44)    CAPILLARY BLOOD GLUCOSE      POCT Blood Glucose.: 141 mg/dL (2019 22:14)    I&O's Summary    2019 07:01  -  2019 07:00  --------------------------------------------------------  IN: 240 mL / OUT: 1560 mL / NET: -1320 mL      Daily     Daily Weight in k (2019 04:00)    PHYSICAL EXAMINATION:  General: NAD, appears stated age   HEENT: PERRLA, EOMI, moist mucous membranes, +JVD  Neurology: A&Ox3, nonfocal, CUADRA x 4  Respiratory: Bibasilar crackles, on 3L NC   CV: RRR, S1S2, no murmurs, rubs or gallops  Abdominal: Soft, NT, ND +BS, Last BM  Extremities: No edema, + peripheral pulses  Skin: normal color and turgor; no rash  Incisions:   Tubes:    LABS:  ABG - ( 2019 16:20 )  pH, Arterial: 7.44  pH, Blood: x     /  pCO2: 30    /  pO2: 125   / HCO3: 22    / Base Excess: -3.6  /  SaO2: 97.5                                    9.1    11.57 )-----------( 301      ( 2019 03:12 )             29.7         140  |  104  |  56<H>  ----------------------------<  181<H>  3.6   |  19<L>  |  1.97<H>    Ca    8.8      2019 03:12  Phos  3.2       Mg     2.1         TPro  7.4  /  Alb  3.7  /  TBili  0.3  /  DBili  x   /  AST  14  /  ALT  9   /  AlkPhos  61      LIVER FUNCTIONS - ( 2019 03:12 )  Alb: 3.7 g/dL / Pro: 7.4 g/dL / ALK PHOS: 61 u/L / ALT: 9 u/L / AST: 14 u/L / GGT: x           PT/INR - ( 2019 16:30 )   PT: 12.4 SEC;   INR: 1.08          PTT - ( 2019 16:30 )  PTT:34.2 SEC  CKMB: 2.53 ng/mL ( @ 12:48)  Creatine Kinase, Serum: 76 u/L ( @ 12:48)  CKMB Relative Index: Test not performed ( @ 12:48)  Creatine Kinase, Serum: 82 u/L ( @ 08:45)  CKMB: 2.54 ng/mL ( @ 08:45)  CKMB Relative Index: Test not performed ( @ 08:45)    CARDIAC MARKERS ( 2019 12:48 )  x     / x     / 76 u/L / 2.53 ng/mL / x      CARDIAC MARKERS ( 2019 08:45 )  x     / x     / 82 u/L / 2.54 ng/mL / x      CARDIAC MARKERS ( 2019 20:12 )  x     / x     / 75 u/L / 2.93 ng/mL / x      CARDIAC MARKERS ( 2019 16:00 )  x     / x     / 71 u/L / 2.86 ng/mL / x      CARDIAC MARKERS ( 2019 12:01 )  x     / x     / 96 u/L / 7.66 ng/mL / x      CARDIAC MARKERS ( 2019 09:23 )  x     / x     / 73 u/L / 2.98 ng/mL / x          Urinalysis Basic - ( 2019 09:30 )    Color: LIGHT YELLOW / Appearance: CLEAR / S.011 / pH: 6.0  Gluc: 70 / Ketone: NEGATIVE  / Bili: NEGATIVE / Urobili: NORMAL   Blood: NEGATIVE / Protein: NEGATIVE / Nitrite: NEGATIVE   Leuk Esterase: NEGATIVE / RBC: x / WBC x   Sq Epi: x / Non Sq Epi: x / Bacteria: x        TELEMETRY:     EKG:     IMAGING:

## 2019-04-27 NOTE — PROGRESS NOTE ADULT - ASSESSMENT
73YOF with hx of CAD s/p CABG, hypothyroidism, CKD, CHF, HTN, DM p/w worsening ZEE and sob. Patient usually can walk up a few steps before feeling sob, currently feeling sob after walking from living room to kitchen.  She is using 3 pillows to sleep. No cough, fevers. (+) CP, SS and constant, She experienced more ZEE for last two days.  She has no edema in lower extremity.  Last admission to [facility] was 11/18. (24 Apr 2019 10:21)    ER vss.  Pt afebrile.  WBC 8.7 --> 10.7.  UA (-), RVP (-).  4/25 cxr with L hazy retrocardiac opacity.   Pt found to have NSTEMI and gross fluid overload, started on IV lasix.  She is pending LHC.        ID consult called for further abx managment and evaluation for pna.       Recommend:    - Pt with ZEE/SOB, (+) NSTEMI.  Cxr with L hazy opacity.  Pt w/o cough or fever to suggest pna on clinical basis.  CT chest shows mosaic attentuation/pneumonitis/atypical infection.       - Azithro d/c'd due to allergic reaction. Legionella Ag neg.  Pct is mildly elevated.      - Check repeat cxr following diuresis, evaluate for developing consolidations/pna.  Abx changed to levaquin by primary team.  Recommend checking serial EKGs and Qtc interval (last Qtc = 504) while pt on fluroquinolone.      - Pt transferred to CCU for continued managment of acute decompensated CHF.     - Plan for eventual LHC when medically optimized.    Will followJoselin Rai  [phone]

## 2019-04-27 NOTE — PROGRESS NOTE ADULT - PROBLEM SELECTOR PLAN 3
- patient with SOB, +cough, CT with ground glass opacities   - levofloxacin (renally dosed) for CAP - patient with SOB, +cough, CT with ground glass opacities   - levofloxacin (renally dosed) for CAP; to be continued for minimum of 5 days (possibly tuesday night)

## 2019-04-27 NOTE — PROGRESS NOTE ADULT - ASSESSMENT
73yoF with history of CAD s/p CABG, systolic CHF (EF 20%), CKD III, who p/w sob and pino. Admitted for acute decompensated systolic heart failure. Hospital course complicated by hypotension in the setting of beta blocker administration during acute decompensated HF. 73yoF with history of CAD s/p CABG, systolic CHF (EF 20%), CKD III, who p/w sob and pino. Admitted for acute decompensated systolic heart failure. Hospital course complicated by hypotension in the setting of beta blocker administration during acute decompensated HF, probable PNA (on levofloxacin)

## 2019-04-27 NOTE — PROGRESS NOTE ADULT - ATTENDING COMMENTS
[id number] she has chf exacerbation: her WBC Count is still high: She is on empiric antibiotics for suspected pneumonia:

## 2019-04-27 NOTE — PROGRESS NOTE ADULT - ASSESSMENT
73YOF w/o h/o CAD, s/p CABG CHF adm to [facility] for Acute on Chronic systolic and diastolic HF

## 2019-04-27 NOTE — PROGRESS NOTE ADULT - SUBJECTIVE AND OBJECTIVE BOX
Patient is a 73y old  Female who presents with a chief complaint of sob (27 Apr 2019 07:41)    Any change in ROS: lethargic but easily arouable. denies chest pain, SOB, cough, on nasal cannular     MEDICATIONS  (STANDING):  ALBUTerol/ipratropium for Nebulization 3 milliLiter(s) Nebulizer every 6 hours  aspirin enteric coated 81 milliGRAM(s) Oral daily  atorvastatin 40 milliGRAM(s) Oral at bedtime  chlorhexidine 4% Liquid 1 Application(s) Topical <User Schedule>  dextrose 5%. 1000 milliLiter(s) (50 mL/Hr) IV Continuous <Continuous>  dextrose 50% Injectable 12.5 Gram(s) IV Push once  dextrose 50% Injectable 25 Gram(s) IV Push once  dextrose 50% Injectable 25 Gram(s) IV Push once  heparin  Injectable 5000 Unit(s) SubCutaneous every 8 hours  hydrALAZINE 10 milliGRAM(s) Oral every 8 hours  influenza   Vaccine 0.5 milliLiter(s) IntraMuscular once  insulin glargine Injectable (LANTUS) 65 Unit(s) SubCutaneous at bedtime  insulin lispro (HumaLOG) corrective regimen sliding scale   SubCutaneous three times a day before meals  insulin lispro (HumaLOG) corrective regimen sliding scale   SubCutaneous at bedtime  insulin lispro Injectable (HumaLOG) 30 Unit(s) SubCutaneous three times a day before meals  levothyroxine 50 MICROGram(s) Oral daily  sodium chloride 0.65% Nasal 1 Spray(s) Both Nostrils three times a day  ticagrelor 90 milliGRAM(s) Oral two times a day    MEDICATIONS  (PRN):  acetaminophen   Tablet .. 650 milliGRAM(s) Oral every 6 hours PRN Mild Pain (1 - 3), Moderate Pain (4 - 6), Severe Pain (7 - 10)  ALBUTerol/ipratropium for Nebulization. 3 milliLiter(s) Nebulizer once PRN Shortness of Breath  dextrose 40% Gel 15 Gram(s) Oral once PRN Blood Glucose LESS THAN 70 milliGRAM(s)/deciliter  diphenhydrAMINE 25 milliGRAM(s) Oral every 4 hours PRN Rash and/or Itching  glucagon  Injectable 1 milliGRAM(s) IntraMuscular once PRN Glucose LESS THAN 70 milligrams/deciliter  ondansetron Injectable 4 milliGRAM(s) IV Push once PRN Nausea and/or Vomiting    Vital Signs Last 24 Hrs  T(C): 36.9 (27 Apr 2019 08:00), Max: 37.2 (26 Apr 2019 13:34)  T(F): 98.5 (27 Apr 2019 08:00), Max: 98.9 (26 Apr 2019 13:34)  HR: 89 (27 Apr 2019 10:28) (73 - 102)  BP: 110/59 (27 Apr 2019 08:00) (88/46 - 128/56)  BP(mean): 74 (27 Apr 2019 08:00) (63 - 94)  RR: 24 (27 Apr 2019 08:00) (19 - 33)  SpO2: 100% (27 Apr 2019 10:28) (99% - 100%)    I&O's Summary    26 Apr 2019 07:01  -  27 Apr 2019 07:00  --------------------------------------------------------  IN: 240 mL / OUT: 1560 mL / NET: -1320 mL    27 Apr 2019 07:01  -  27 Apr 2019 12:23  --------------------------------------------------------  IN: 240 mL / OUT: 450 mL / NET: -210 mL          Physical Exam:   GENERAL: The patient comfortable with no apparent distress.   HEENT: Head is normocephalic and atraumatic.    NECK: Supple with no elevated JVP.  LUNGS: Crackles bases  HEART: S1 and S2 present without murmur.  ABDOMEN: Soft, nontender, and nondistended.   EXTREMITIES: No edema or calf tenderness.  NEUROLOGIC: Grossly intact.  : Oscar   Labs:  ABG - ( 26 Apr 2019 16:20 )  pH, Arterial: 7.44  pH, Blood: x     /  pCO2: 30    /  pO2: 125   / HCO3: 22    / Base Excess: -3.6  /  SaO2: 97.5            22  CARDIAC MARKERS ( 26 Apr 2019 12:48 )  x     / x     / 76 u/L / 2.53 ng/mL / x      CARDIAC MARKERS ( 26 Apr 2019 08:45 )  x     / x     / 82 u/L / 2.54 ng/mL / x      CARDIAC MARKERS ( 25 Apr 2019 20:12 )  x     / x     / 75 u/L / 2.93 ng/mL / x      CARDIAC MARKERS ( 25 Apr 2019 16:00 )  x     / x     / 71 u/L / 2.86 ng/mL / x                                9.1    11.57 )-----------( 301      ( 27 Apr 2019 03:12 )             29.7                         10.1   12.27 )-----------( 282      ( 26 Apr 2019 16:20 )             33.4                         8.4    10.62 )-----------( 246      ( 26 Apr 2019 12:48 )             29.0                         10.6   14.28 )-----------( 331      ( 26 Apr 2019 06:30 )             35.1                         10.5   10.43 )-----------( 303      ( 25 Apr 2019 21:35 )             33.2                         10.2   10.77 )-----------( 305      ( 25 Apr 2019 05:45 )             34.2                         10.2   8.77  )-----------( 285      ( 23 Apr 2019 23:20 )             33.6     04-27    140  |  104  |  56<H>  ----------------------------<  181<H>  3.6   |  19<L>  |  1.97<H>  04-26    139  |  102  |  60<H>  ----------------------------<  155<H>  3.9   |  19<L>  |  2.06<H>  04-26    136  |  99  |  55<H>  ----------------------------<  266<H>  3.9   |  19<L>  |  1.92<H>  04-26    142  |  106  |  57<H>  ----------------------------<  98  3.9   |  16<L>  |  1.89<H>  04-25    143  |  106  |  52<H>  ----------------------------<  123<H>  3.4<L>   |  21<L>  |  1.91<H>  04-23    142  |  104  |  58<H>  ----------------------------<  185<H>  3.5   |  20<L>  |  2.21<H>    Ca    8.8      27 Apr 2019 03:12  Ca    9.2      26 Apr 2019 16:20  Ca    9.0      26 Apr 2019 12:48  Ca    9.3      26 Apr 2019 06:30  Phos  3.2     04-27  Phos  3.6     04-26  Phos  3.1     04-26  Mg     2.1     04-27  Mg     2.2     04-26  Mg     2.1     04-26  Mg     2.3     04-26    TPro  7.4  /  Alb  3.7  /  TBili  0.3  /  DBili  x   /  AST  14  /  ALT  9   /  AlkPhos  61  04-27  TPro  7.6  /  Alb  3.7  /  TBili  0.3  /  DBili  x   /  AST  17  /  ALT  11  /  AlkPhos  65  04-26  TPro  7.6  /  Alb  3.9  /  TBili  0.3  /  DBili  x   /  AST  16  /  ALT  9   /  AlkPhos  66  04-26  TPro  8.0  /  Alb  4.3  /  TBili  0.4  /  DBili  x   /  AST  16  /  ALT  10  /  AlkPhos  73  04-23    CAPILLARY BLOOD GLUCOSE      POCT Blood Glucose.: 116 mg/dL (27 Apr 2019 11:47)  POCT Blood Glucose.: 90 mg/dL (27 Apr 2019 07:58)  POCT Blood Glucose.: 141 mg/dL (26 Apr 2019 22:14)  POCT Blood Glucose.: 133 mg/dL (26 Apr 2019 16:55)  POCT Blood Glucose.: 202 mg/dL (26 Apr 2019 13:01)      LIVER FUNCTIONS - ( 27 Apr 2019 03:12 )  Alb: 3.7 g/dL / Pro: 7.4 g/dL / ALK PHOS: 61 u/L / ALT: 9 u/L / AST: 14 u/L / GGT: x           PT/INR - ( 26 Apr 2019 16:30 )   PT: 12.4 SEC;   INR: 1.08          PTT - ( 26 Apr 2019 16:30 )  PTT:34.2 SEC    D-Dimer Assay, Quantitative: 385 ng/mL (04-24 @ 13:50)  Procalcitonin, Serum: 0.13 ng/mL (04-26 @ 11:36)  Lactate, Blood: 2.0 mmol/L (04-27 @ 03:12)  Lactate, Blood: 1.5 mmol/L (04-26 @ 16:30)      Studies  Chest X-RAY  < from: Xray Chest 1 View- PORTABLE-Urgent (04.25.19 @ 08:45) >    EXAM:  XR CHEST PORTABLE URGENT 1V        PROCEDURE DATE:  Apr 25 2019         INTERPRETATION:  TIME OF EXAM: April 25, 2019 at 8:25 AM.    CLINICAL INFORMATION: Shortness of breath.    COMPARISON:  April 24, 2019.    TECHNIQUE:   AP Portable chestx-ray.    INTERPRETATION:     Heart size and the mediastinum cannot be accurately evaluated on this   projection. Median sternotomy sutures, coronary artery stent, and   surgical clips are again seen. The thoracic aorta is calcified.  The right lung is clear.  Hazy left retrocardiac opacity with incomplete definition of the right   hemidiaphragm. The left upper and mid lung is clear.  No pleural effusion or pneumothorax is seen.              IMPRESSION:  Hazy left retrocardiac opacity which may be due to   subsegmental atelectasis and/or developing pneumonia.    < end of copied text >    CT SCAN Chest   < from: CT Chest No Cont (04.25.19 @ 20:27) >  EXAM:  CT CHEST        PROCEDURE DATE:  Apr 25 2019         INTERPRETATION:  CT CHEST WITHOUT CONTRAST    INDICATION: Shortness of breath, rule out pneumonia    TECHNIQUE: Unenhanced helical images were obtained of the chest. Coronal   and sagittalimages were reconstructed. Maximum intensity projection   images were generated.     COMPARISON: CT chest 1/31/2018    FINDINGS: The quality of the images are degraded by respiratory motion   and expiratory phase of the imaging.    AIRWAYS, LUNGS, PLEURA: Patent central airways. No bronchial wall   thickening or bronchiectasis.    Mosaic attenuation of the lungs.    No consolidation.    Trace layering bilateral pleural effusions.    MEDIASTINUM, LYMPH NODES: No lymphadenopathy.    HEART AND VASCULATURE: The heart is mildly enlarged without pericardial   effusion. Thoracic aorta and pulmonary artery normal in course and   caliber. CABG.    UPPER ABDOMEN: Within normal limits.    BONES AND SOFT TISSUES: No aggressive osseous lesion. Degenerative   changes of the spine. Fatty atrophy of the paraspinous muscles.    LOWER NECK: Within normal limits.    IMPRESSION:     Mosaic attenuation of the lungs. This finding is indeterminant and may be   secondary to air trapping, pneumonitis or atypical infection.    < end of copied text >    Venous Dopplers: LE: < from: US Duplex Venous Lower Ext Complete, Bilateral (04.26.19 @ 09:59) >  EXAM:  US DPLX LWR EXT VEINS COMPL BI        PROCEDURE DATE:  Apr 26 2019         INTERPRETATION:  CLINICAL INFORMATION: Chest pain.  Pleurodynia    COMPARISON: None available.    TECHNIQUE: Duplex sonography of the BILATERAL LOWER extremities with  color and spectral Doppler, with and without compression.      FINDINGS:    There is normal compressibility of the bilateral common femoral, femoral   and popliteal veins. No calf vein thrombosis is detected.    Doppler examination shows normal spontaneous and phasic flow.    IMPRESSION:     No evidence of bilateral lower extremity deep venous thrombosis.    < end of copied text >    CT Abdomen < from: CT Abdomen and Pelvis w/ Oral Cont and w/ IV Cont (10.20.17 @ 13:34) >    EXAM:  CT ABDOMEN AND PELVIS OC IC      EXAM:  CT CHEST IC        PROCEDURE DATE:  Oct 20 2017         INTERPRETATION:  CLINICAL INFORMATION: Evaluate for underlying infectious   or inflammatory condition 71-year-old female with multiple medical   problems and coronary artery disease presenting with chest pain.   Leukocytosis. A high    COMPARISON: None.    PROCEDURE:   CT of the Chest, Abdomen and Pelvis was performed with intravenous   contrast.   Intravenous contrast: 90 ml Omnipaque 350. 10ml discarded.  Oral contrast: positive contrast was administered.  Sagittal and coronal reformats were performed.    FINDINGS:    CHEST:     LUNGS AND LARGE AIRWAYS: Patent central airways. No pulmonary nodules.   Bibasilar subsegmental atelectasis.  PLEURA: No pleural effusion.  VESSELS: Atherosclerotic calcifications of the thoracic aorta .  HEART: Heart size is normal. No pericardial effusion.  MEDIASTINUM AND ADIA: No lymphadenopathy.  CHEST WALL AND LOWER NECK: Within normal limits.    ABDOMEN AND PELVIS:    LIVER: Within normal limits.  BILE DUCTS: Normal caliber.  GALLBLADDER: Cholelithiasis without evidence of acute cholecystitis.  SPLEEN: Within normal limits.  PANCREAS: Within normal limits.  ADRENALS: Within normal limits.  KIDNEYS/URETERS: Symmetrically enhance without hydronephrosis. 1.4 x 2.2   cm indeterminant left renal upper pole lesion.    BLADDER: Within normal limits.  REPRODUCTIVE ORGANS: Uterus is unremarkable. 1.3 x 1.0 cm right ovarian   cyst. Suggest follow-up transvaginal ultrasound in 6 months.   BOWEL: No bowel obstruction. Appendix is normal  PERITONEUM: No ascites.  VESSELS:  Atherosclerotic calcifications.  RETROPERITONEUM: No lymphadenopathy.    ABDOMINAL WALL: Subcutaneous edema and fat stranding alongthe right   inguinal region, likely associated with recent catheter procedure. No   fluid collection or abscess.  BONES: Mild degenerative changes.    IMPRESSION:   1.  No evidence of an intra-abdominal or intrathoracic source of   infection.  2.  A 2.2 cm left renal upper pole indeterminate lesion. Nonemergent   ultrasound  may performed for further characterization.  3.  Small right ovarian cyst. Suggest follow-up transvaginal ultrasound   in 6 months.   4.  Small hematoma right inguinal regionconsistent with recent   catheterization.    < end of copied text >    Others  < from: Transthoracic Echocardiogram (04.26.19 @ 14:31) >    Patient name: SELVIN CLAIRE  YOB: 1945   Age: 73 (F)   MR#: [id number]  Study Date: 4/26/2019  [facility]  Study quality: Technically good  Referring Physician: Rafael Velarde MD  Blood Pressure: 99/46 mmHg  Height: 152 cm  Weight: 56 kg  BSA: 1.5 m2  ------------------------------------------------------------------------  PROCEDURE: Transthoracic echocardiogram with 2-D, M-Mode  and complete spectral and color flow Doppler.  INDICATION: Dyspnea, unspecified (R06.00)  ------------------------------------------------------------------------  OBSERVATIONS:  Mitral Valve: Tethered mitral valve leaflets. Severe mitral  regurgitation.  Aortic Root: Normal aortic root.  Left Ventricle:Severe left ventricular systolic  dysfunction.  Right Heart: Normal right ventricular size with decreased  right ventricular systolic function. Normal tricuspid  valve.  Moderate-severe tricuspid regurgitation.  Pericardium/PleuraNormal pericardium with no pericardial  effusion.  Hemodynamic: Estimated right ventricular systolic pressure  equals 70 mm Hg, assuming right atrial pressure equals 10  mm Hg, consistent with severe pulmonary hypertension.  ------------------------------------------------------------------------  CONCLUSIONS:  Limited study to reevaluate RV/PASP  1. Tethered mitral valve leaflets. Severe mitral  regurgitation.  2. Severe left ventricular systolic dysfunction.  3. Normal right ventricular size with decreased right  ventricular systolic function.  4. Normal tricuspid valve.  Moderate-severe tricuspid  regurgitation.  5. Estimated pulmonary artery systolic pressure equals 70  mm Hg, assuming right atrial pressure equals 10  mm Hg,  consistent with severe pulmonary hypertension.    < end of copied text >

## 2019-04-27 NOTE — PROGRESS NOTE ADULT - SUBJECTIVE AND OBJECTIVE BOX
SELVIN CLAIRE  73y  Patient is a 73y old  Female who presents with a chief complaint of sob (2019 07:41)    HPI:  Admitted for CHF decompensation and Pneumonia, followed for MERVIN on CKD. No new complaints.    HEALTH ISSUES - PROBLEM Dx:  PNA (pneumonia): PNA (pneumonia)  Prophylactic measure: Prophylactic measure  Hypothyroid: Hypothyroid  Acute kidney injury superimposed on CKD: Acute kidney injury superimposed on CKD  Type 2 diabetes mellitus: Type 2 diabetes mellitus  Troponin level elevated: Troponin level elevated  Acute on chronic systolic heart failure: Acute on chronic systolic heart failure  CKD (chronic kidney disease): CKD (chronic kidney disease)  GERD (gastroesophageal reflux disease): GERD (gastroesophageal reflux disease)  Hypothyroidism: Hypothyroidism  Need for prophylactic measure: Need for prophylactic measure  Diabetes mellitus, type 2: Diabetes mellitus, type 2  Hyperlipidemia: Hyperlipidemia  CAD (coronary artery disease): CAD (coronary artery disease)  CHF exacerbation: CHF exacerbation        MEDICATIONS  (STANDING):  ALBUTerol/ipratropium for Nebulization 3 milliLiter(s) Nebulizer every 6 hours  aspirin enteric coated 81 milliGRAM(s) Oral daily  atorvastatin 40 milliGRAM(s) Oral at bedtime  chlorhexidine 4% Liquid 1 Application(s) Topical <User Schedule>  dextrose 5%. 1000 milliLiter(s) (50 mL/Hr) IV Continuous <Continuous>  dextrose 50% Injectable 12.5 Gram(s) IV Push once  dextrose 50% Injectable 25 Gram(s) IV Push once  dextrose 50% Injectable 25 Gram(s) IV Push once  heparin  Injectable 5000 Unit(s) SubCutaneous every 8 hours  hydrALAZINE 10 milliGRAM(s) Oral every 8 hours  influenza   Vaccine 0.5 milliLiter(s) IntraMuscular once  insulin glargine Injectable (LANTUS) 65 Unit(s) SubCutaneous at bedtime  insulin lispro (HumaLOG) corrective regimen sliding scale   SubCutaneous three times a day before meals  insulin lispro (HumaLOG) corrective regimen sliding scale   SubCutaneous at bedtime  insulin lispro Injectable (HumaLOG) 30 Unit(s) SubCutaneous three times a day before meals  levothyroxine 50 MICROGram(s) Oral daily  sodium chloride 0.65% Nasal 1 Spray(s) Both Nostrils three times a day  ticagrelor 90 milliGRAM(s) Oral two times a day    MEDICATIONS  (PRN):  acetaminophen   Tablet .. 650 milliGRAM(s) Oral every 6 hours PRN Mild Pain (1 - 3), Moderate Pain (4 - 6), Severe Pain (7 - 10)  ALBUTerol/ipratropium for Nebulization. 3 milliLiter(s) Nebulizer once PRN Shortness of Breath  dextrose 40% Gel 15 Gram(s) Oral once PRN Blood Glucose LESS THAN 70 milliGRAM(s)/deciliter  diphenhydrAMINE 25 milliGRAM(s) Oral every 4 hours PRN Rash and/or Itching  glucagon  Injectable 1 milliGRAM(s) IntraMuscular once PRN Glucose LESS THAN 70 milligrams/deciliter  ondansetron Injectable 4 milliGRAM(s) IV Push once PRN Nausea and/or Vomiting    Vital Signs Last 24 Hrs  T(C): 36.9 (2019 08:00), Max: 37.2 (2019 13:34)  T(F): 98.5 (2019 08:00), Max: 98.9 (2019 13:34)  HR: 89 (2019 10:28) (73 - 102)  BP: 110/59 (2019 08:00) (88/46 - 128/56)  BP(mean): 74 (2019 08:00) (63 - 94)  RR: 24 (2019 08:00) (19 - 33)  SpO2: 100% (2019 10:28) (99% - 100%)  Daily     Daily Weight in k (2019 04:00)    PHYSICAL EXAM:  Constitutional: She appears comfortable and not distressed. Not diaphoretic.    Neck:  The thyroid is normal. Trachea is midline.     Respiratory: The lungs are clear to auscultation. No dullness and expansion is normal.    Cardiovascular: S1 and S2 are normal. No mummurs, rubs or gallops are present.    Gastrointestinal: The abdomen is soft. No tenderness is present. No masses are present. Bowel sounds are normal.    Genitourinary: The bladder is not distended. No CVA tenderness is present.    Extremities: No edema is noted. No deformities are present.    Neurological: Cognition is normal. Tone, power and sensation are normal.     Skin: No leasions are seen  or palpated.                            9.1    11.57 )-----------( 301      ( 2019 03:12 )             29.7     04-    140  |  104  |  56<H>  ----------------------------<  181<H>  3.6   |  19<L>  |  1.97<H>    Ca    8.8      2019 03:12  Phos  3.2       Mg     2.1         TPro  7.4  /  Alb  3.7  /  TBili  0.3  /  DBili  x   /  AST  14  /  ALT  9   /  AlkPhos  61

## 2019-04-27 NOTE — PROGRESS NOTE ADULT - SUBJECTIVE AND OBJECTIVE BOX
Infectious Diseases progress note:    Subjective:  No new fever, WBC improved.  Pt with improved SOB.  Denies cough.  Abx changed from rocephin to levaquin    ROS:  CONSTITUTIONAL:  No fever, chills, rigors  CARDIOVASCULAR:  No chest pain or palpitations  RESPIRATORY:   No SOB, cough, dyspnea on exertion.  No wheezing  GASTROINTESTINAL:  No abd pain, N/V, diarrhea/constipation  EXTREMITIES:  No swelling or joint pain  GENITOURINARY:  No burning on urination, increased frequency or urgency.  No flank pain  NEUROLOGIC:  No HA, visual disturbances  SKIN: No rashes    Allergies    azithromycin (Hives; Pruritus)    Intolerances        ANTIBIOTICS/RELEVANT:  antimicrobials    immunologic:  influenza   Vaccine 0.5 milliLiter(s) IntraMuscular once    OTHER:  acetaminophen   Tablet .. 650 milliGRAM(s) Oral every 6 hours PRN  ALBUTerol/ipratropium for Nebulization 3 milliLiter(s) Nebulizer every 6 hours  ALBUTerol/ipratropium for Nebulization. 3 milliLiter(s) Nebulizer once PRN  aspirin enteric coated 81 milliGRAM(s) Oral daily  atorvastatin 40 milliGRAM(s) Oral at bedtime  chlorhexidine 4% Liquid 1 Application(s) Topical <User Schedule>  dextrose 40% Gel 15 Gram(s) Oral once PRN  dextrose 5%. 1000 milliLiter(s) IV Continuous <Continuous>  dextrose 50% Injectable 12.5 Gram(s) IV Push once  dextrose 50% Injectable 25 Gram(s) IV Push once  dextrose 50% Injectable 25 Gram(s) IV Push once  diphenhydrAMINE 25 milliGRAM(s) Oral every 4 hours PRN  glucagon  Injectable 1 milliGRAM(s) IntraMuscular once PRN  heparin  Injectable 5000 Unit(s) SubCutaneous every 8 hours  hydrALAZINE 10 milliGRAM(s) Oral every 8 hours  insulin glargine Injectable (LANTUS) 65 Unit(s) SubCutaneous at bedtime  insulin lispro (HumaLOG) corrective regimen sliding scale   SubCutaneous three times a day before meals  insulin lispro (HumaLOG) corrective regimen sliding scale   SubCutaneous at bedtime  insulin lispro Injectable (HumaLOG) 30 Unit(s) SubCutaneous three times a day before meals  levothyroxine 50 MICROGram(s) Oral daily  ondansetron Injectable 4 milliGRAM(s) IV Push once PRN  sodium chloride 0.65% Nasal 1 Spray(s) Both Nostrils three times a day  ticagrelor 90 milliGRAM(s) Oral two times a day      Objective:  Vital Signs Last 24 Hrs  T(C): 36.9 (27 Apr 2019 08:00), Max: 37 (26 Apr 2019 20:00)  T(F): 98.5 (27 Apr 2019 08:00), Max: 98.6 (26 Apr 2019 20:00)  HR: 88 (27 Apr 2019 14:00) (73 - 103)  BP: 104/51 (27 Apr 2019 14:00) (90/42 - 128/56)  BP(mean): 67 (27 Apr 2019 14:00) (58 - 94)  RR: 22 (27 Apr 2019 14:00) (19 - 33)  SpO2: 100% (27 Apr 2019 14:00) (97% - 100%)    PHYSICAL EXAM:  Constitutional:NAD. slightly tachypneic  Eyes:MOHSEN, EOMI  Ear/Nose/Throat: no thrush, mucositis.  Moist mucous membranes	  Neck:no JVD, no lymphadenopathy, supple  Respiratory: mild crackles bibasilar  Cardiovascular: S1S2 RRR, no murmurs  Gastrointestinal:soft, nontender,  nondistended (+) BS  Extremities:no e/e/c  Skin:  no rashes, open wounds or ulcerations        LABS:                        9.1    11.57 )-----------( 301      ( 27 Apr 2019 03:12 )             29.7     04-27    140  |  104  |  56<H>  ----------------------------<  181<H>  3.6   |  19<L>  |  1.97<H>    Ca    8.8      27 Apr 2019 03:12  Phos  3.2     04-27  Mg     2.1     04-27    TPro  7.4  /  Alb  3.7  /  TBili  0.3  /  DBili  x   /  AST  14  /  ALT  9   /  AlkPhos  61  04-27    PT/INR - ( 26 Apr 2019 16:30 )   PT: 12.4 SEC;   INR: 1.08          PTT - ( 26 Apr 2019 16:30 )  PTT:34.2 SEC      Procalcitonin, Serum: 0.13 (04-26 @ 11:36)                    MICROBIOLOGY:    Culture - Blood (04.25.19 @ 10:34)    Culture - Blood:   NO ORGANISMS ISOLATED  NO ORGANISMS ISOLATED AT 48 HRS.    Specimen Source: BLOOD VENOUS    Culture - Blood (04.25.19 @ 10:34)    Culture - Blood:   NO ORGANISMS ISOLATED  NO ORGANISMS ISOLATED AT 48 HRS.    Specimen Source: BLOOD PERIPHERAL          RADIOLOGY & ADDITIONAL STUDIES:    < from: US Duplex Venous Lower Ext Complete, Bilateral (04.26.19 @ 09:59) >  FINDINGS:    There is normal compressibility of the bilateral common femoral, femoral   and popliteal veins. No calf vein thrombosis is detected.    Doppler examination shows normal spontaneous and phasic flow.    IMPRESSION:     No evidence of bilateral lower extremity deep venous thrombosis.    < end of copied text >

## 2019-04-27 NOTE — PROGRESS NOTE ADULT - ASSESSMENT
1.	MERVIN, improved.  2.	CHF decompensation.  3.	Pneumonia.  4.	CKD.    PLAN:   1.	Hold RAAS blockade.  2.	Follow up BMP.

## 2019-04-28 LAB
ALBUMIN SERPL ELPH-MCNC: 3.8 G/DL — SIGNIFICANT CHANGE UP (ref 3.3–5)
ALP SERPL-CCNC: 60 U/L — SIGNIFICANT CHANGE UP (ref 40–120)
ALT FLD-CCNC: 7 U/L — SIGNIFICANT CHANGE UP (ref 4–33)
ANION GAP SERPL CALC-SCNC: 16 MMO/L — HIGH (ref 7–14)
ANION GAP SERPL CALC-SCNC: 19 MMO/L — HIGH (ref 7–14)
AST SERPL-CCNC: 12 U/L — SIGNIFICANT CHANGE UP (ref 4–32)
BILIRUB SERPL-MCNC: 0.3 MG/DL — SIGNIFICANT CHANGE UP (ref 0.2–1.2)
BUN SERPL-MCNC: 56 MG/DL — HIGH (ref 7–23)
BUN SERPL-MCNC: 57 MG/DL — HIGH (ref 7–23)
CALCIUM SERPL-MCNC: 10.7 MG/DL — HIGH (ref 8.4–10.5)
CALCIUM SERPL-MCNC: 9.9 MG/DL — SIGNIFICANT CHANGE UP (ref 8.4–10.5)
CHLORIDE SERPL-SCNC: 105 MMOL/L — SIGNIFICANT CHANGE UP (ref 98–107)
CHLORIDE SERPL-SCNC: 107 MMOL/L — SIGNIFICANT CHANGE UP (ref 98–107)
CO2 SERPL-SCNC: 18 MMOL/L — LOW (ref 22–31)
CO2 SERPL-SCNC: 19 MMOL/L — LOW (ref 22–31)
CREAT SERPL-MCNC: 1.98 MG/DL — HIGH (ref 0.5–1.3)
CREAT SERPL-MCNC: 2.02 MG/DL — HIGH (ref 0.5–1.3)
GLUCOSE SERPL-MCNC: 150 MG/DL — HIGH (ref 70–99)
GLUCOSE SERPL-MCNC: 224 MG/DL — HIGH (ref 70–99)
HCT VFR BLD CALC: 28.8 % — LOW (ref 34.5–45)
HGB BLD-MCNC: 8.8 G/DL — LOW (ref 11.5–15.5)
MAGNESIUM SERPL-MCNC: 2.4 MG/DL — SIGNIFICANT CHANGE UP (ref 1.6–2.6)
MAGNESIUM SERPL-MCNC: 2.5 MG/DL — SIGNIFICANT CHANGE UP (ref 1.6–2.6)
MCHC RBC-ENTMCNC: 28.5 PG — SIGNIFICANT CHANGE UP (ref 27–34)
MCHC RBC-ENTMCNC: 30.6 % — LOW (ref 32–36)
MCV RBC AUTO: 93.2 FL — SIGNIFICANT CHANGE UP (ref 80–100)
NRBC # FLD: 0 K/UL — SIGNIFICANT CHANGE UP (ref 0–0)
PHOSPHATE SERPL-MCNC: 3.7 MG/DL — SIGNIFICANT CHANGE UP (ref 2.5–4.5)
PHOSPHATE SERPL-MCNC: 4.8 MG/DL — HIGH (ref 2.5–4.5)
PLATELET # BLD AUTO: 282 K/UL — SIGNIFICANT CHANGE UP (ref 150–400)
PMV BLD: 9.3 FL — SIGNIFICANT CHANGE UP (ref 7–13)
POTASSIUM SERPL-MCNC: 4.3 MMOL/L — SIGNIFICANT CHANGE UP (ref 3.5–5.3)
POTASSIUM SERPL-MCNC: 4.4 MMOL/L — SIGNIFICANT CHANGE UP (ref 3.5–5.3)
POTASSIUM SERPL-SCNC: 4.3 MMOL/L — SIGNIFICANT CHANGE UP (ref 3.5–5.3)
POTASSIUM SERPL-SCNC: 4.4 MMOL/L — SIGNIFICANT CHANGE UP (ref 3.5–5.3)
PROT SERPL-MCNC: 7.6 G/DL — SIGNIFICANT CHANGE UP (ref 6–8.3)
RBC # BLD: 3.09 M/UL — LOW (ref 3.8–5.2)
RBC # FLD: 16.7 % — HIGH (ref 10.3–14.5)
SODIUM SERPL-SCNC: 139 MMOL/L — SIGNIFICANT CHANGE UP (ref 135–145)
SODIUM SERPL-SCNC: 145 MMOL/L — SIGNIFICANT CHANGE UP (ref 135–145)
WBC # BLD: 12.13 K/UL — HIGH (ref 3.8–10.5)
WBC # FLD AUTO: 12.13 K/UL — HIGH (ref 3.8–10.5)

## 2019-04-28 RX ORDER — GABAPENTIN 400 MG/1
100 CAPSULE ORAL
Qty: 0 | Refills: 0 | Status: DISCONTINUED | OUTPATIENT
Start: 2019-04-28 | End: 2019-05-16

## 2019-04-28 RX ORDER — GABAPENTIN 400 MG/1
100 CAPSULE ORAL THREE TIMES A DAY
Qty: 0 | Refills: 0 | Status: DISCONTINUED | OUTPATIENT
Start: 2019-04-28 | End: 2019-04-28

## 2019-04-28 RX ORDER — PANTOPRAZOLE SODIUM 20 MG/1
40 TABLET, DELAYED RELEASE ORAL
Qty: 0 | Refills: 0 | Status: DISCONTINUED | OUTPATIENT
Start: 2019-04-28 | End: 2019-05-16

## 2019-04-28 RX ORDER — ACETAMINOPHEN 500 MG
500 TABLET ORAL ONCE
Qty: 0 | Refills: 0 | Status: COMPLETED | OUTPATIENT
Start: 2019-04-28 | End: 2019-04-28

## 2019-04-28 RX ORDER — FUROSEMIDE 40 MG
60 TABLET ORAL ONCE
Qty: 0 | Refills: 0 | Status: COMPLETED | OUTPATIENT
Start: 2019-04-28 | End: 2019-04-28

## 2019-04-28 RX ADMIN — Medication 10 MILLIGRAM(S): at 15:48

## 2019-04-28 RX ADMIN — GABAPENTIN 100 MILLIGRAM(S): 400 CAPSULE ORAL at 13:00

## 2019-04-28 RX ADMIN — Medication 1 SPRAY(S): at 15:49

## 2019-04-28 RX ADMIN — Medication 2: at 08:44

## 2019-04-28 RX ADMIN — Medication 10 MILLIGRAM(S): at 21:57

## 2019-04-28 RX ADMIN — Medication 1 SPRAY(S): at 21:58

## 2019-04-28 RX ADMIN — Medication 650 MILLIGRAM(S): at 23:00

## 2019-04-28 RX ADMIN — PANTOPRAZOLE SODIUM 40 MILLIGRAM(S): 20 TABLET, DELAYED RELEASE ORAL at 15:50

## 2019-04-28 RX ADMIN — OXYCODONE AND ACETAMINOPHEN 1 TABLET(S): 5; 325 TABLET ORAL at 02:44

## 2019-04-28 RX ADMIN — GABAPENTIN 100 MILLIGRAM(S): 400 CAPSULE ORAL at 21:57

## 2019-04-28 RX ADMIN — Medication 60 MILLIGRAM(S): at 10:14

## 2019-04-28 RX ADMIN — Medication 3 MILLILITER(S): at 09:37

## 2019-04-28 RX ADMIN — Medication 1 SPRAY(S): at 06:32

## 2019-04-28 RX ADMIN — ATORVASTATIN CALCIUM 40 MILLIGRAM(S): 80 TABLET, FILM COATED ORAL at 21:57

## 2019-04-28 RX ADMIN — Medication 3 MILLILITER(S): at 21:42

## 2019-04-28 RX ADMIN — Medication 30 UNIT(S): at 12:04

## 2019-04-28 RX ADMIN — Medication 100 MILLIGRAM(S): at 06:31

## 2019-04-28 RX ADMIN — Medication 200 MILLIGRAM(S): at 10:01

## 2019-04-28 RX ADMIN — HEPARIN SODIUM 5000 UNIT(S): 5000 INJECTION INTRAVENOUS; SUBCUTANEOUS at 15:48

## 2019-04-28 RX ADMIN — INSULIN GLARGINE 65 UNIT(S): 100 INJECTION, SOLUTION SUBCUTANEOUS at 21:56

## 2019-04-28 RX ADMIN — Medication 650 MILLIGRAM(S): at 22:01

## 2019-04-28 RX ADMIN — HEPARIN SODIUM 5000 UNIT(S): 5000 INJECTION INTRAVENOUS; SUBCUTANEOUS at 21:57

## 2019-04-28 RX ADMIN — Medication 100 MILLIGRAM(S): at 17:19

## 2019-04-28 RX ADMIN — Medication 10 MILLIGRAM(S): at 06:31

## 2019-04-28 RX ADMIN — TICAGRELOR 90 MILLIGRAM(S): 90 TABLET ORAL at 06:31

## 2019-04-28 RX ADMIN — Medication 3 MILLILITER(S): at 04:25

## 2019-04-28 RX ADMIN — Medication 1: at 12:03

## 2019-04-28 RX ADMIN — SENNA PLUS 2 TABLET(S): 8.6 TABLET ORAL at 21:58

## 2019-04-28 RX ADMIN — Medication 500 MILLIGRAM(S): at 10:01

## 2019-04-28 RX ADMIN — CHLORHEXIDINE GLUCONATE 1 APPLICATION(S): 213 SOLUTION TOPICAL at 12:04

## 2019-04-28 RX ADMIN — Medication 50 MICROGRAM(S): at 06:31

## 2019-04-28 RX ADMIN — Medication 30 UNIT(S): at 17:21

## 2019-04-28 RX ADMIN — Medication 30 UNIT(S): at 08:44

## 2019-04-28 RX ADMIN — TICAGRELOR 90 MILLIGRAM(S): 90 TABLET ORAL at 17:20

## 2019-04-28 RX ADMIN — HEPARIN SODIUM 5000 UNIT(S): 5000 INJECTION INTRAVENOUS; SUBCUTANEOUS at 06:31

## 2019-04-28 RX ADMIN — Medication 81 MILLIGRAM(S): at 12:09

## 2019-04-28 NOTE — PROGRESS NOTE ADULT - PROBLEM SELECTOR PLAN 1
4/27 CT chest noted. audible crackles bilateral bases monitor I&O, keep negative.  continue diuretics.  4/28 lung sounds improved. negative 1.7 liters.  continue strict I&O, keep negative. Empiric antibiotic for mild leukocytosis, CT chest can't r/o atypical pneumonia or pneumonitis. ID is following

## 2019-04-28 NOTE — PROGRESS NOTE ADULT - ASSESSMENT
73yoF with history of CAD s/p CABG, systolic CHF (EF 20%), CKD III, who p/w sob and pino. Admitted for acute decompensated systolic heart failure. Hospital course complicated by hypotension in the setting of beta blocker administration during acute decompensated HF, probable PNA (on levofloxacin)

## 2019-04-28 NOTE — PROGRESS NOTE ADULT - SUBJECTIVE AND OBJECTIVE BOX
Patient is a 73y old  Female who presents with a chief complaint of sob (28 Apr 2019 10:42)    Any change in ROS: OOB to chair. hemodynamically stable.     MEDICATIONS  (STANDING):  ALBUTerol/ipratropium for Nebulization 3 milliLiter(s) Nebulizer every 6 hours  aspirin enteric coated 81 milliGRAM(s) Oral daily  atorvastatin 40 milliGRAM(s) Oral at bedtime  chlorhexidine 4% Liquid 1 Application(s) Topical <User Schedule>  dextrose 5%. 1000 milliLiter(s) (50 mL/Hr) IV Continuous <Continuous>  dextrose 50% Injectable 12.5 Gram(s) IV Push once  dextrose 50% Injectable 25 Gram(s) IV Push once  dextrose 50% Injectable 25 Gram(s) IV Push once  docusate sodium 100 milliGRAM(s) Oral two times a day  gabapentin 100 milliGRAM(s) Oral three times a day  heparin  Injectable 5000 Unit(s) SubCutaneous every 8 hours  hydrALAZINE 10 milliGRAM(s) Oral every 8 hours  influenza   Vaccine 0.5 milliLiter(s) IntraMuscular once  insulin glargine Injectable (LANTUS) 65 Unit(s) SubCutaneous at bedtime  insulin lispro (HumaLOG) corrective regimen sliding scale   SubCutaneous three times a day before meals  insulin lispro (HumaLOG) corrective regimen sliding scale   SubCutaneous at bedtime  insulin lispro Injectable (HumaLOG) 30 Unit(s) SubCutaneous three times a day before meals  levoFLOXacin  Tablet 500 milliGRAM(s) Oral every 48 hours  levothyroxine 50 MICROGram(s) Oral daily  senna 2 Tablet(s) Oral at bedtime  sodium chloride 0.65% Nasal 1 Spray(s) Both Nostrils three times a day  ticagrelor 90 milliGRAM(s) Oral two times a day    MEDICATIONS  (PRN):  acetaminophen   Tablet .. 650 milliGRAM(s) Oral every 6 hours PRN Mild Pain (1 - 3), Moderate Pain (4 - 6), Severe Pain (7 - 10)  ALBUTerol/ipratropium for Nebulization. 3 milliLiter(s) Nebulizer once PRN Shortness of Breath  dextrose 40% Gel 15 Gram(s) Oral once PRN Blood Glucose LESS THAN 70 milliGRAM(s)/deciliter  diphenhydrAMINE 25 milliGRAM(s) Oral every 4 hours PRN Rash and/or Itching  glucagon  Injectable 1 milliGRAM(s) IntraMuscular once PRN Glucose LESS THAN 70 milligrams/deciliter  ondansetron Injectable 4 milliGRAM(s) IV Push once PRN Nausea and/or Vomiting    Vital Signs Last 24 Hrs  T(C): 36.9 (28 Apr 2019 08:00), Max: 36.9 (27 Apr 2019 15:58)  T(F): 98.4 (28 Apr 2019 08:00), Max: 98.4 (27 Apr 2019 15:58)  HR: 105 (28 Apr 2019 11:00) (86 - 111)  BP: 129/48 (28 Apr 2019 11:00) (90/42 - 135/59)  BP(mean): 73 (28 Apr 2019 11:00) (58 - 83)  RR: 26 (28 Apr 2019 11:00) (21 - 40)  SpO2: 100% (28 Apr 2019 11:00) (95% - 100%)    I&O's Summary    27 Apr 2019 07:01  -  28 Apr 2019 07:00  --------------------------------------------------------  IN: 460 mL / OUT: 1930 mL / NET: -1470 mL      Physical Exam:   GENERAL: The patient comfortable with no apparent distress.   HEENT: Head is normocephalic and atraumatic.    NECK: Supple with no elevated JVP.  LUNGS: Clear to auscultation without wheeze and rhonchi.  HEART: S1 and S2 present without murmur.  ABDOMEN: Soft, nontender, and nondistended.   EXTREMITIES: No edema or calf tenderness.  NEUROLOGIC: Grossly intact.    Labs:  ABG - ( 26 Apr 2019 16:20 )  pH, Arterial: 7.44  pH, Blood: x     /  pCO2: 30    /  pO2: 125   / HCO3: 22    / Base Excess: -3.6  /  SaO2: 97.5            22  CARDIAC MARKERS ( 26 Apr 2019 12:48 )  x     / x     / 76 u/L / 2.53 ng/mL / x                                8.8    12.13 )-----------( 282      ( 28 Apr 2019 05:00 )             28.8                         9.1    11.57 )-----------( 301      ( 27 Apr 2019 03:12 )             29.7                         10.1   12.27 )-----------( 282      ( 26 Apr 2019 16:20 )             33.4                         8.4    10.62 )-----------( 246      ( 26 Apr 2019 12:48 )             29.0                         10.6   14.28 )-----------( 331      ( 26 Apr 2019 06:30 )             35.1                         10.5   10.43 )-----------( 303      ( 25 Apr 2019 21:35 )             33.2                         10.2   10.77 )-----------( 305      ( 25 Apr 2019 05:45 )             34.2     04-28    139  |  105  |  56<H>  ----------------------------<  224<H>  4.4   |  18<L>  |  1.98<H>  04-27    141  |  103  |  57<H>  ----------------------------<  167<H>  3.8   |  20<L>  |  2.06<H>  04-27    140  |  104  |  56<H>  ----------------------------<  181<H>  3.6   |  19<L>  |  1.97<H>  04-26    139  |  102  |  60<H>  ----------------------------<  155<H>  3.9   |  19<L>  |  2.06<H>  04-26    136  |  99  |  55<H>  ----------------------------<  266<H>  3.9   |  19<L>  |  1.92<H>  04-26    142  |  106  |  57<H>  ----------------------------<  98  3.9   |  16<L>  |  1.89<H>  04-25    143  |  106  |  52<H>  ----------------------------<  123<H>  3.4<L>   |  21<L>  |  1.91<H>    Ca    9.9      28 Apr 2019 05:00  Ca    9.5      27 Apr 2019 18:01  Ca    8.8      27 Apr 2019 03:12  Ca    9.2      26 Apr 2019 16:20  Ca    9.0      26 Apr 2019 12:48  Phos  4.8     04-28  Phos  3.3     04-27  Phos  3.2     04-27  Phos  3.6     04-26  Phos  3.1     04-26  Mg     2.4     04-28  Mg     2.3     04-27  Mg     2.1     04-27  Mg     2.2     04-26  Mg     2.1     04-26    TPro  7.6  /  Alb  3.8  /  TBili  0.3  /  DBili  x   /  AST  12  /  ALT  7   /  AlkPhos  60  04-28  TPro  7.9  /  Alb  4.1  /  TBili  0.3  /  DBili  x   /  AST  14  /  ALT  8   /  AlkPhos  63  04-27  TPro  7.4  /  Alb  3.7  /  TBili  0.3  /  DBili  x   /  AST  14  /  ALT  9   /  AlkPhos  61  04-27  TPro  7.6  /  Alb  3.7  /  TBili  0.3  /  DBili  x   /  AST  17  /  ALT  11  /  AlkPhos  65  04-26  TPro  7.6  /  Alb  3.9  /  TBili  0.3  /  DBili  x   /  AST  16  /  ALT  9   /  AlkPhos  66  04-26    CAPILLARY BLOOD GLUCOSE      POCT Blood Glucose.: 192 mg/dL (28 Apr 2019 11:54)  POCT Blood Glucose.: 213 mg/dL (28 Apr 2019 08:06)  POCT Blood Glucose.: 134 mg/dL (27 Apr 2019 21:18)  POCT Blood Glucose.: 162 mg/dL (27 Apr 2019 17:24)      LIVER FUNCTIONS - ( 28 Apr 2019 05:00 )  Alb: 3.8 g/dL / Pro: 7.6 g/dL / ALK PHOS: 60 u/L / ALT: 7 u/L / AST: 12 u/L / GGT: x           PT/INR - ( 26 Apr 2019 16:30 )   PT: 12.4 SEC;   INR: 1.08          PTT - ( 26 Apr 2019 16:30 )  PTT:34.2 SEC    D-Dimer Assay, Quantitative: 385 ng/mL (04-24 @ 13:50)  Procalcitonin, Serum: 0.13 ng/mL (04-26 @ 11:36)  Lactate, Blood: 2.0 mmol/L (04-27 @ 03:12)  Lactate, Blood: 1.5 mmol/L (04-26 @ 16:30)      Studies  Chest X-RAY  < from: Xray Chest 1 View- PORTABLE-Urgent (04.25.19 @ 08:45) >  EXAM:  XR CHEST PORTABLE URGENT 1V        PROCEDURE DATE:  Apr 25 2019         INTERPRETATION:  TIME OF EXAM: April 25, 2019 at 8:25 AM.    CLINICAL INFORMATION: Shortness of breath.    COMPARISON:  April 24, 2019.    TECHNIQUE:   AP Portable chestx-ray.    INTERPRETATION:     Heart size and the mediastinum cannot be accurately evaluated on this   projection. Median sternotomy sutures, coronary artery stent, and   surgical clips are again seen. The thoracic aorta is calcified.  The right lung is clear.  Hazy left retrocardiac opacity with incomplete definition of the right   hemidiaphragm. The left upper and mid lung is clear.  No pleural effusion or pneumothorax is seen.              IMPRESSION:  Hazy left retrocardiac opacity which may be due to   subsegmental atelectasis and/or developing pneumonia.      < end of copied text >    CT SCAN Chest   < from: CT Chest No Cont (04.25.19 @ 20:27) >  EXAM:  CT CHEST        PROCEDURE DATE:  Apr 25 2019         INTERPRETATION:  CT CHEST WITHOUT CONTRAST    INDICATION: Shortness of breath, rule out pneumonia    TECHNIQUE: Unenhanced helical images were obtained of the chest. Coronal   and sagittalimages were reconstructed. Maximum intensity projection   images were generated.     COMPARISON: CT chest 1/31/2018    FINDINGS: The quality of the images are degraded by respiratory motion   and expiratory phase of the imaging.    AIRWAYS, LUNGS, PLEURA: Patent central airways. No bronchial wall   thickening or bronchiectasis.    Mosaic attenuation of the lungs.    No consolidation.    Trace layering bilateral pleural effusions.    MEDIASTINUM, LYMPH NODES: No lymphadenopathy.    HEART AND VASCULATURE: The heart is mildly enlarged without pericardial   effusion. Thoracic aorta and pulmonary artery normal in course and   caliber. CABG.    UPPER ABDOMEN: Within normal limits.    BONES AND SOFT TISSUES: No aggressive osseous lesion. Degenerative   changes of the spine. Fatty atrophy of the paraspinous muscles.    LOWER NECK: Within normal limits.    IMPRESSION:     Mosaic attenuation of the lungs. This finding is indeterminant and may be   secondary to air trapping, pneumonitis or atypical infection.    < end of copied text >    Venous Dopplers: LE: < from: US Duplex Venous Lower Ext Complete, Bilateral (04.26.19 @ 09:59) >    EXAM:  US DPLX LWR EXT VEINS COMPL BI        PROCEDURE DATE:  Apr 26 2019         INTERPRETATION:  CLINICAL INFORMATION: Chest pain.  Pleurodynia    COMPARISON: None available.    TECHNIQUE: Duplex sonography of the BILATERAL LOWER extremities with  color and spectral Doppler, with and without compression.      FINDINGS:    There is normal compressibility of the bilateral common femoral, femoral   and popliteal veins. No calf vein thrombosis is detected.    Doppler examination shows normal spontaneous and phasic flow.    IMPRESSION:     No evidence of bilateral lower extremity deep venous thrombosis.    < end of copied text >    CT Abdomen < from: CT Abdomen and Pelvis w/ Oral Cont and w/ IV Cont (10.20.17 @ 13:34) >    EXAM:  CT ABDOMEN AND PELVIS OC IC      EXAM:  CT CHEST IC        PROCEDURE DATE:  Oct 20 2017         INTERPRETATION:  CLINICAL INFORMATION: Evaluate for underlying infectious   or inflammatory condition 71-year-old female with multiple medical   problems and coronary artery disease presenting with chest pain.   Leukocytosis. A high    COMPARISON: None.    PROCEDURE:   CT of the Chest, Abdomen and Pelvis was performed with intravenous   contrast.   Intravenous contrast: 90 ml Omnipaque 350. 10ml discarded.  Oral contrast: positive contrast was administered.  Sagittal and coronal reformats were performed.    FINDINGS:    CHEST:     LUNGS AND LARGE AIRWAYS: Patent central airways. No pulmonary nodules.   Bibasilar subsegmental atelectasis.  PLEURA: No pleural effusion.  VESSELS: Atherosclerotic calcifications of the thoracic aorta .  HEART: Heart size is normal. No pericardial effusion.  MEDIASTINUM AND ADIA: No lymphadenopathy.  CHEST WALL AND LOWER NECK: Within normal limits.    ABDOMEN AND PELVIS:    LIVER: Within normal limits.  BILE DUCTS: Normal caliber.  GALLBLADDER: Cholelithiasis without evidence of acute cholecystitis.  SPLEEN: Within normal limits.  PANCREAS: Within normal limits.  ADRENALS: Within normal limits.  KIDNEYS/URETERS: Symmetrically enhance without hydronephrosis. 1.4 x 2.2   cm indeterminant left renal upper pole lesion.    BLADDER: Within normal limits.  REPRODUCTIVE ORGANS: Uterus is unremarkable. 1.3 x 1.0 cm right ovarian   cyst. Suggest follow-up transvaginal ultrasound in 6 months.   BOWEL: No bowel obstruction. Appendix is normal  PERITONEUM: No ascites.  VESSELS:  Atherosclerotic calcifications.  RETROPERITONEUM: No lymphadenopathy.    ABDOMINAL WALL: Subcutaneous edema and fat stranding alongthe right   inguinal region, likely associated with recent catheter procedure. No   fluid collection or abscess.  BONES: Mild degenerative changes.    IMPRESSION:   1.  No evidence of an intra-abdominal or intrathoracic source of   infection.  2.  A 2.2 cm left renal upper pole indeterminate lesion. Nonemergent   ultrasound  may performed for further characterization.  3.  Small right ovarian cyst. Suggest follow-up transvaginal ultrasound   in 6 months.   4.  Small hematoma right inguinal regionconsistent with recent   catheterization.      < end of copied text >    Others  < from: Transthoracic Echocardiogram (04.26.19 @ 14:31) >    Patient name: SELVIN CLAIRE  YOB: 1945   Age: 73 (F)   MR#: [id number]  Study Date: 4/26/2019  [facility]  Study quality: Technically good  Referring Physician: Rafael Velarde MD  Blood Pressure: 99/46 mmHg  Height: 152 cm  Weight: 56 kg  BSA: 1.5 m2  ------------------------------------------------------------------------  PROCEDURE: Transthoracic echocardiogram with 2-D, M-Mode  and complete spectral and color flow Doppler.  INDICATION: Dyspnea, unspecified (R06.00)  ------------------------------------------------------------------------  OBSERVATIONS:  Mitral Valve: Tethered mitral valve leaflets. Severe mitral  regurgitation.  Aortic Root: Normal aortic root.  Left Ventricle:Severe left ventricular systolic  dysfunction.  Right Heart: Normal right ventricular size with decreased  right ventricular systolic function. Normal tricuspid  valve.  Moderate-severe tricuspid regurgitation.  Pericardium/PleuraNormal pericardium with no pericardial  effusion.  Hemodynamic: Estimated right ventricular systolic pressure  equals 70 mm Hg, assuming right atrial pressure equals 10  mm Hg, consistent with severe pulmonary hypertension.  ------------------------------------------------------------------------  CONCLUSIONS:  Limited study to reevaluate RV/PASP  1. Tethered mitral valve leaflets. Severe mitral  regurgitation.  2. Severe left ventricular systolic dysfunction.  3. Normal right ventricular size with decreased right  ventricular systolic function.  4. Normal tricuspid valve.  Moderate-severe tricuspid  regurgitation.  5. Estimated pulmonary artery systolic pressure equals 70  mm Hg, assuming right atrial pressure equals 10  mm Hg,  consistent with severe pulmonary hypertension.    < end of copied text >

## 2019-04-28 NOTE — PROGRESS NOTE ADULT - PROBLEM SELECTOR PLAN 2
4/27 chest pain is stable. on dual antiplatelet therapy. management per cardiology  4/28 per cardiology some point heart cath

## 2019-04-28 NOTE — PROGRESS NOTE ADULT - ASSESSMENT
73YOF w/o h/o CAD, s/p CABG CHF adm to [facility] for Acute on Chronic systolic and diastolic HF

## 2019-04-28 NOTE — CHART NOTE - NSCHARTNOTEFT_GEN_A_CORE
Fellow Addendum:   73F with CAD s/p CABG, ICM/HFrEF (EF 20%, LVIDD 5.5cm), HTN, HLD, DM, CKD a/w AHDF    Impression:  1. Acute on Chronic Systolic HF: +JVD, crackles on exam  2. PNA: on abx  3. MERVIN: cardiorenal, creatinine 1.9 (baseline 1.5-1.8)  4. Severe MR: ischemic?  5. Severe PH: 2/2 left sided heart disease    Recommendations:  -Lasix 60mg IV now, goal -1L for 24 hours  -Continue Hydralazine to 10mg TID starting with afternoon dose for afterload reduction  -Hold beta-blocker given decompensation and recent hypotension on floors  -Tentative plan for LV on Monday to assess MR

## 2019-04-28 NOTE — PROGRESS NOTE ADULT - ASSESSMENT
1.	Pneumonia, improved.  2.	CHF.  3.	MERVIN on CKD, improved.  4.	Anemia, multifactorial.  5.	Metabolic Acidosis.    PLAN:   1.	Continue Lasix.  2.	Follow up BMP.  3.	No RAAS blockade for now./

## 2019-04-28 NOTE — PROGRESS NOTE ADULT - PROBLEM SELECTOR PLAN 9
DVT PPx: heparin 5000 TID   Diet: fluid restriction, renal/diabetic/DASH diet  Dispo: pending medical management

## 2019-04-28 NOTE — PROGRESS NOTE ADULT - SUBJECTIVE AND OBJECTIVE BOX
INTERVAL HPI/OVERNIGHT EVENTS:     SUBJECTIVE: Patient seen and examined at bedside. Patient denies chest pain/shortness of breath/abdominal pain/nausea/vomiting/diarrhea.     ROS: otherwise negative    OBJECTIVE:    VITAL SIGNS:  ICU Vital Signs Last 24 Hrs  T(C): 36.9 (28 Apr 2019 08:00), Max: 37.1 (27 Apr 2019 12:00)  T(F): 98.4 (28 Apr 2019 08:00), Max: 98.7 (27 Apr 2019 12:00)  HR: 102 (28 Apr 2019 10:00) (86 - 111)  BP: 111/50 (28 Apr 2019 10:00) (90/42 - 135/59)  BP(mean): 66 (28 Apr 2019 10:00) (58 - 83)  RR: 25 (28 Apr 2019 10:00) (21 - 40)  SpO2: 99% (28 Apr 2019 10:00) (95% - 100%)    04-27 @ 07:01  -  04-28 @ 07:00  --------------------------------------------------------  IN: 460 mL / OUT: 1930 mL / NET: -1470 mL      CAPILLARY BLOOD GLUCOSE    POCT Blood Glucose.: 213 mg/dL (28 Apr 2019 08:06)    PHYSICAL EXAM:  General: _______ male, appearing stated age. No acute distress   HEENT: normocephalic, atraumatic. Moist mucous membranes   Eyes: clear conjunctiva, PERRL  Neck: supple  Respiratory: clear to auscultation bilaterally   Cardiovascular: S1, S2. Regular rate and rhythm. No murmurs/rubs/gallops  Abdomen: soft, nondistended, nontender. +BS  Extremities: WWP, 2+ peripheral pulses b/l; no LE edema  Skin: normal color and turgor; no rash  Neurological: A&Ox3     MEDICATIONS:  MEDICATIONS  (STANDING):  ALBUTerol/ipratropium for Nebulization 3 milliLiter(s) Nebulizer every 6 hours  aspirin enteric coated 81 milliGRAM(s) Oral daily  atorvastatin 40 milliGRAM(s) Oral at bedtime  chlorhexidine 4% Liquid 1 Application(s) Topical <User Schedule>  dextrose 5%. 1000 milliLiter(s) (50 mL/Hr) IV Continuous <Continuous>  dextrose 50% Injectable 12.5 Gram(s) IV Push once  dextrose 50% Injectable 25 Gram(s) IV Push once  dextrose 50% Injectable 25 Gram(s) IV Push once  docusate sodium 100 milliGRAM(s) Oral two times a day  heparin  Injectable 5000 Unit(s) SubCutaneous every 8 hours  hydrALAZINE 10 milliGRAM(s) Oral every 8 hours  influenza   Vaccine 0.5 milliLiter(s) IntraMuscular once  insulin glargine Injectable (LANTUS) 65 Unit(s) SubCutaneous at bedtime  insulin lispro (HumaLOG) corrective regimen sliding scale   SubCutaneous three times a day before meals  insulin lispro (HumaLOG) corrective regimen sliding scale   SubCutaneous at bedtime  insulin lispro Injectable (HumaLOG) 30 Unit(s) SubCutaneous three times a day before meals  levoFLOXacin  Tablet 500 milliGRAM(s) Oral every 48 hours  levothyroxine 50 MICROGram(s) Oral daily  senna 2 Tablet(s) Oral at bedtime  sodium chloride 0.65% Nasal 1 Spray(s) Both Nostrils three times a day  ticagrelor 90 milliGRAM(s) Oral two times a day    MEDICATIONS  (PRN):  acetaminophen   Tablet .. 650 milliGRAM(s) Oral every 6 hours PRN Mild Pain (1 - 3), Moderate Pain (4 - 6), Severe Pain (7 - 10)  ALBUTerol/ipratropium for Nebulization. 3 milliLiter(s) Nebulizer once PRN Shortness of Breath  dextrose 40% Gel 15 Gram(s) Oral once PRN Blood Glucose LESS THAN 70 milliGRAM(s)/deciliter  diphenhydrAMINE 25 milliGRAM(s) Oral every 4 hours PRN Rash and/or Itching  glucagon  Injectable 1 milliGRAM(s) IntraMuscular once PRN Glucose LESS THAN 70 milligrams/deciliter  ondansetron Injectable 4 milliGRAM(s) IV Push once PRN Nausea and/or Vomiting    ALLERGIES:  Allergies    azithromycin (Hives; Pruritus)    Intolerances    LABS:                        8.8    12.13 )-----------( 282      ( 28 Apr 2019 05:00 )             28.8     04-28    139  |  105  |  56<H>  ----------------------------<  224<H>  4.4   |  18<L>  |  1.98<H>    Ca    9.9      28 Apr 2019 05:00  Phos  4.8     04-28  Mg     2.4     04-28    TPro  7.6  /  Alb  3.8  /  TBili  0.3  /  DBili  x   /  AST  12  /  ALT  7   /  AlkPhos  60  04-28    PT/INR - ( 26 Apr 2019 16:30 )   PT: 12.4 SEC;   INR: 1.08          PTT - ( 26 Apr 2019 16:30 )  PTT:34.2 SEC      RADIOLOGY & ADDITIONAL TESTS: Reviewed. INTERVAL HPI/OVERNIGHT EVENTS: no acute events overnight     SUBJECTIVE: Patient seen and examined at bedside. Patient reports pain in her left shoulder and left foot. Otherwise denies chest pain/shortness of breath/abdominal pain/nausea/vomiting/diarrhea.     ROS: otherwise negative    OBJECTIVE:    VITAL SIGNS:  ICU Vital Signs Last 24 Hrs  T(C): 36.9 (28 Apr 2019 08:00), Max: 37.1 (27 Apr 2019 12:00)  T(F): 98.4 (28 Apr 2019 08:00), Max: 98.7 (27 Apr 2019 12:00)  HR: 102 (28 Apr 2019 10:00) (86 - 111)  BP: 111/50 (28 Apr 2019 10:00) (90/42 - 135/59)  BP(mean): 66 (28 Apr 2019 10:00) (58 - 83)  RR: 25 (28 Apr 2019 10:00) (21 - 40)  SpO2: 99% (28 Apr 2019 10:00) (95% - 100%)    04-27 @ 07:01  -  04-28 @ 07:00  --------------------------------------------------------  IN: 460 mL / OUT: 1930 mL / NET: -1470 mL    CAPILLARY BLOOD GLUCOSE    POCT Blood Glucose.: 213 mg/dL (28 Apr 2019 08:06)    PHYSICAL EXAM:  General: elderly female, appearing stated age. Breathing well    HEENT: normocephalic, atraumatic. Moist mucous membranes   Eyes: clear conjunctiva, PERRL  Neck: supple. JVD to mandible.   Respiratory: Vertical scar with keloid formation. Bibasilar crackles. Clear to auscultation anteriorly. Breathing well on 3L.   Cardiovascular: S1, S2. Regular rate and rhythm. No murmurs/rubs/gallops  Abdomen: soft, nondistended, nontender. +BS  Extremities: WWP, 2+ peripheral pulses b/l; no LE edema. Dorsum of left feet tender to palpation. Left shoulder tender    Skin: normal color and turgor; no rash  Neurological: awake and alert     MEDICATIONS:  MEDICATIONS  (STANDING):  ALBUTerol/ipratropium for Nebulization 3 milliLiter(s) Nebulizer every 6 hours  aspirin enteric coated 81 milliGRAM(s) Oral daily  atorvastatin 40 milliGRAM(s) Oral at bedtime  chlorhexidine 4% Liquid 1 Application(s) Topical <User Schedule>  dextrose 5%. 1000 milliLiter(s) (50 mL/Hr) IV Continuous <Continuous>  dextrose 50% Injectable 12.5 Gram(s) IV Push once  dextrose 50% Injectable 25 Gram(s) IV Push once  dextrose 50% Injectable 25 Gram(s) IV Push once  docusate sodium 100 milliGRAM(s) Oral two times a day  heparin  Injectable 5000 Unit(s) SubCutaneous every 8 hours  hydrALAZINE 10 milliGRAM(s) Oral every 8 hours  influenza   Vaccine 0.5 milliLiter(s) IntraMuscular once  insulin glargine Injectable (LANTUS) 65 Unit(s) SubCutaneous at bedtime  insulin lispro (HumaLOG) corrective regimen sliding scale   SubCutaneous three times a day before meals  insulin lispro (HumaLOG) corrective regimen sliding scale   SubCutaneous at bedtime  insulin lispro Injectable (HumaLOG) 30 Unit(s) SubCutaneous three times a day before meals  levoFLOXacin  Tablet 500 milliGRAM(s) Oral every 48 hours  levothyroxine 50 MICROGram(s) Oral daily  senna 2 Tablet(s) Oral at bedtime  sodium chloride 0.65% Nasal 1 Spray(s) Both Nostrils three times a day  ticagrelor 90 milliGRAM(s) Oral two times a day    MEDICATIONS  (PRN):  acetaminophen   Tablet .. 650 milliGRAM(s) Oral every 6 hours PRN Mild Pain (1 - 3), Moderate Pain (4 - 6), Severe Pain (7 - 10)  ALBUTerol/ipratropium for Nebulization. 3 milliLiter(s) Nebulizer once PRN Shortness of Breath  dextrose 40% Gel 15 Gram(s) Oral once PRN Blood Glucose LESS THAN 70 milliGRAM(s)/deciliter  diphenhydrAMINE 25 milliGRAM(s) Oral every 4 hours PRN Rash and/or Itching  glucagon  Injectable 1 milliGRAM(s) IntraMuscular once PRN Glucose LESS THAN 70 milligrams/deciliter  ondansetron Injectable 4 milliGRAM(s) IV Push once PRN Nausea and/or Vomiting    ALLERGIES:  Allergies    azithromycin (Hives; Pruritus)    Intolerances    LABS:                        8.8    12.13 )-----------( 282      ( 28 Apr 2019 05:00 )             28.8     04-28    139  |  105  |  56<H>  ----------------------------<  224<H>  4.4   |  18<L>  |  1.98<H>    Ca    9.9      28 Apr 2019 05:00  Phos  4.8     04-28  Mg     2.4     04-28    TPro  7.6  /  Alb  3.8  /  TBili  0.3  /  DBili  x   /  AST  12  /  ALT  7   /  AlkPhos  60  04-28    PT/INR - ( 26 Apr 2019 16:30 )   PT: 12.4 SEC;   INR: 1.08        PTT - ( 26 Apr 2019 16:30 )  PTT:34.2 SEC    RADIOLOGY & ADDITIONAL TESTS: Reviewed.

## 2019-04-28 NOTE — PROGRESS NOTE ADULT - PROBLEM SELECTOR PLAN 1
- lasix IV prn. Will give another 60mg IV now and monitor UO  - On TTE, Severe LV dysfunction. Severe pulm HTN. mod-severe MR.   - hold beta blockers in the setting of acute decompensated heart failure  - strict I/O  - No ACE/ARB given MERVIN   - started on hydralazine 5 mg q8h (hold for SBP <90) for afterload reduction; will increase to TID at next dose    - c/brad Oscar

## 2019-04-28 NOTE — PROGRESS NOTE ADULT - SUBJECTIVE AND OBJECTIVE BOX
SELVIN CLAIRE  73y  Patient is a 73y old  Female who presents with a chief complaint of sob (2019 12:08)    HPI:  She is followed for MERVIN on CKD.  No new complaints.    HEALTH ISSUES - PROBLEM Dx:  PNA (pneumonia): PNA (pneumonia)  Prophylactic measure: Prophylactic measure  Hypothyroid: Hypothyroid  Acute kidney injury superimposed on CKD: Acute kidney injury superimposed on CKD  Type 2 diabetes mellitus: Type 2 diabetes mellitus  Troponin level elevated: Troponin level elevated  Acute on chronic systolic heart failure: Acute on chronic systolic heart failure  CKD (chronic kidney disease): CKD (chronic kidney disease)  GERD (gastroesophageal reflux disease): GERD (gastroesophageal reflux disease)  Hypothyroidism: Hypothyroidism  Need for prophylactic measure: Need for prophylactic measure  Diabetes mellitus, type 2: Diabetes mellitus, type 2  Hyperlipidemia: Hyperlipidemia  CAD (coronary artery disease): CAD (coronary artery disease)  CHF exacerbation: CHF exacerbation        MEDICATIONS  (STANDING):  ALBUTerol/ipratropium for Nebulization 3 milliLiter(s) Nebulizer every 6 hours  aspirin enteric coated 81 milliGRAM(s) Oral daily  atorvastatin 40 milliGRAM(s) Oral at bedtime  chlorhexidine 4% Liquid 1 Application(s) Topical <User Schedule>  dextrose 5%. 1000 milliLiter(s) (50 mL/Hr) IV Continuous <Continuous>  dextrose 50% Injectable 12.5 Gram(s) IV Push once  dextrose 50% Injectable 25 Gram(s) IV Push once  dextrose 50% Injectable 25 Gram(s) IV Push once  docusate sodium 100 milliGRAM(s) Oral two times a day  gabapentin 100 milliGRAM(s) Oral two times a day  heparin  Injectable 5000 Unit(s) SubCutaneous every 8 hours  hydrALAZINE 10 milliGRAM(s) Oral every 8 hours  influenza   Vaccine 0.5 milliLiter(s) IntraMuscular once  insulin glargine Injectable (LANTUS) 65 Unit(s) SubCutaneous at bedtime  insulin lispro (HumaLOG) corrective regimen sliding scale   SubCutaneous three times a day before meals  insulin lispro (HumaLOG) corrective regimen sliding scale   SubCutaneous at bedtime  insulin lispro Injectable (HumaLOG) 30 Unit(s) SubCutaneous three times a day before meals  levoFLOXacin  Tablet 500 milliGRAM(s) Oral every 48 hours  levothyroxine 50 MICROGram(s) Oral daily  pantoprazole    Tablet 40 milliGRAM(s) Oral before breakfast  senna 2 Tablet(s) Oral at bedtime  sodium chloride 0.65% Nasal 1 Spray(s) Both Nostrils three times a day  ticagrelor 90 milliGRAM(s) Oral two times a day    MEDICATIONS  (PRN):  acetaminophen   Tablet .. 650 milliGRAM(s) Oral every 6 hours PRN Mild Pain (1 - 3), Moderate Pain (4 - 6), Severe Pain (7 - 10)  ALBUTerol/ipratropium for Nebulization. 3 milliLiter(s) Nebulizer once PRN Shortness of Breath  dextrose 40% Gel 15 Gram(s) Oral once PRN Blood Glucose LESS THAN 70 milliGRAM(s)/deciliter  diphenhydrAMINE 25 milliGRAM(s) Oral every 4 hours PRN Rash and/or Itching  glucagon  Injectable 1 milliGRAM(s) IntraMuscular once PRN Glucose LESS THAN 70 milligrams/deciliter  ondansetron Injectable 4 milliGRAM(s) IV Push once PRN Nausea and/or Vomiting    Vital Signs Last 24 Hrs  T(C): 36.4 (2019 15:16), Max: 36.9 (2019 08:00)  T(F): 97.6 (2019 15:16), Max: 98.4 (2019 08:00)  HR: 97 (2019 15:00) (92 - 111)  BP: 120/58 (2019 15:00) (98/56 - 135/59)  BP(mean): 78 (2019 15:00) (64 - 83)  RR: 26 (2019 15:00) (17 - 40)  SpO2: 100% (2019 15:00) (95% - 100%)  Daily     Daily Weight in k.2 (2019 06:00)    PHYSICAL EXAM:  Constitutional: She appears comfortable and not distressed. Not diaphoretic.    Respiratory: The lungs are clear to auscultation. No dullness and expansion is normal.    Cardiovascular: S1 and S2 are normal. No mummurs, rubs or gallops are present.    Gastrointestinal: The abdomen is soft. No tenderness is present. No masses are present. Bowel sounds are normal.    Genitourinary: The bladder is not distended. No CVA tenderness is present.    Extremities: No edema is noted. No deformities are present.    Neurological: Cognition is normal. Tone, power and sensation are normal.     Skin: No lesions are seen  or palpated.                            8.8    12.13 )-----------( 282      ( 2019 05:00 )             28.8         139  |  105  |  56<H>  ----------------------------<  224<H>  4.4   |  18<L>  |  1.98<H>    Ca    9.9      2019 05:00  Phos  4.8       Mg     2.4         TPro  7.6  /  Alb  3.8  /  TBili  0.3  /  DBili  x   /  AST  12  /  ALT  7   /  AlkPhos  60

## 2019-04-28 NOTE — PROGRESS NOTE ADULT - ASSESSMENT
_________________________________________________________________________________________  ========>>  M E D I C A L   A T T E N D I N G    F O L L O W  U P  N O T E  <<=========  			( covering Dr Velarde 4/28 & 29)   -----------------------------------------------------------------------------------------------------    - Patient seen and examined by me approximately thirty minutes ago.  - In summary,  SELVIN CLAIRE is a 73y year old woman who originally presented with SOB / CP  - Patient today overall doing fairly, comfortable, eating OK.     no SOB or CP reported     pt with significant left shoulder pain at times, pain in feet seems improved     ==================>> REVIEW OF SYSTEM <<=================    GEN: no fever, no chills, pain as above   RESP: no SOB at rest , no cough, no sputum  CVS: no chest pain, no palpitations  GI: no abdominal pain, no nausea  : no dysuria  Neuro: no headache, no dizziness  Derm : no itching, no rash    ==================>> PHYSICAL EXAM <<=================    GEN: A&O X 2 , NAD , comfortable in recliner , a bit forgetful at times   HEENT: NCAT, PERRL, MMM, hearing intact  Neck: supple , no JVD  CVS: S1S2 , regular , No M/R/G appreciated  PULM: CTA B/L,  limited, pt not taking deep breath   ABD.: soft. non tender, non distended,  bowel sounds present  Extrem: intact pulses , no significant edema   PSYCH : normal mood,  not anxious      ==================>> MEDICATIONS <<====================    MEDICATIONS  (STANDING):  ALBUTerol/ipratropium for Nebulization 3 milliLiter(s) Nebulizer every 6 hours  aspirin enteric coated 81 milliGRAM(s) Oral daily  atorvastatin 40 milliGRAM(s) Oral at bedtime  chlorhexidine 4% Liquid 1 Application(s) Topical <User Schedule>  dextrose 5%. 1000 milliLiter(s) (50 mL/Hr) IV Continuous <Continuous>  dextrose 50% Injectable 12.5 Gram(s) IV Push once  dextrose 50% Injectable 25 Gram(s) IV Push once  dextrose 50% Injectable 25 Gram(s) IV Push once  docusate sodium 100 milliGRAM(s) Oral two times a day  heparin  Injectable 5000 Unit(s) SubCutaneous every 8 hours  hydrALAZINE 10 milliGRAM(s) Oral every 8 hours  influenza   Vaccine 0.5 milliLiter(s) IntraMuscular once  insulin glargine Injectable (LANTUS) 65 Unit(s) SubCutaneous at bedtime  insulin lispro (HumaLOG) corrective regimen sliding scale   SubCutaneous three times a day before meals  insulin lispro (HumaLOG) corrective regimen sliding scale   SubCutaneous at bedtime  insulin lispro Injectable (HumaLOG) 30 Unit(s) SubCutaneous three times a day before meals  levoFLOXacin  Tablet 500 milliGRAM(s) Oral every 48 hours  levothyroxine 50 MICROGram(s) Oral daily  senna 2 Tablet(s) Oral at bedtime  sodium chloride 0.65% Nasal 1 Spray(s) Both Nostrils three times a day  ticagrelor 90 milliGRAM(s) Oral two times a day    MEDICATIONS  (PRN):  acetaminophen   Tablet .. 650 milliGRAM(s) Oral every 6 hours PRN Mild Pain (1 - 3), Moderate Pain (4 - 6), Severe Pain (7 - 10)  ALBUTerol/ipratropium for Nebulization. 3 milliLiter(s) Nebulizer once PRN Shortness of Breath  dextrose 40% Gel 15 Gram(s) Oral once PRN Blood Glucose LESS THAN 70 milliGRAM(s)/deciliter  diphenhydrAMINE 25 milliGRAM(s) Oral every 4 hours PRN Rash and/or Itching  glucagon  Injectable 1 milliGRAM(s) IntraMuscular once PRN Glucose LESS THAN 70 milligrams/deciliter  ondansetron Injectable 4 milliGRAM(s) IV Push once PRN Nausea and/or Vomiting      ==================>> VITAL SIGNS <<==================    T(C): 36.9 (04-28-19 @ 08:00), Max: 37.1 (04-27-19 @ 12:00)  HR: 102 (04-28-19 @ 10:00) (86 - 111)  BP: 111/50 (04-28-19 @ 10:00) (90/42 - 135/59)  RR: 25 (04-28-19 @ 10:00) (21 - 40)  SpO2: 99% (04-28-19 @ 10:00) (95% - 100%)    POCT Blood Glucose.: 213 mg/dL (28 Apr 2019 08:06)  POCT Blood Glucose.: 134 mg/dL (27 Apr 2019 21:18)  POCT Blood Glucose.: 162 mg/dL (27 Apr 2019 17:24)  POCT Blood Glucose.: 116 mg/dL (27 Apr 2019 11:47)    I&O's Summary    27 Apr 2019 07:01  -  28 Apr 2019 07:00  --------------------------------------------------------  IN: 460 mL / OUT: 1930 mL / NET: -1470 mL     ==================>> LAB AND IMAGING <<==================                        8.8    12.13 )-----------( 282      ( 28 Apr 2019 05:00 )             28.8     Hemoglobin:   8.8 <<==,  9.1 <<==,  10.1 <<==,  8.4 <<==,  10.6 <<==,  10.5 <<==    139  |  105  |  56<H>  ----------------------------<  224<H>  4.4   |  18<L>  |  1.98<H>    Creatinine:  1.98  <<==, 2.06  <<==, 1.97  <<==, 2.06  <<==, 1.92  <<==, 1.89  <<==    Ca    9.9      28 Apr 2019 05:00  Phos  4.8     04-28  Mg     2.4     04-28    TPro  7.6  /  Alb  3.8  /  TBili  0.3  /  DBili  x   /  AST  12  /  ALT  7   /  AlkPhos  60  04-28    PT/INR - ( 26 Apr 2019 16:30 )   PT: 12.4 SEC;   INR: 1.08     PTT - ( 26 Apr 2019 16:30 )  PTT:34.2 SEC          ABG - ( 26 Apr 2019 16:20 )    pH, Arterial: 7.44  pH, Blood: x     /  pCO2: 30    /  pO2: 125   / HCO3: 22    / Base Excess: -3.6  /  SaO2: 97.5       TSH:      1.59   (04-25-19)           Lipid profile:  (04-24-19)     Total: 127     LDL  : 67     HDL  :22     TG   :258     HgA1C: 7.3  (04-25-19)        , 7.2  (04-24-19)            ___________________________________________________________________________________  ===============>>  A S S E S S M E N T   A N D   P L A N <<===============  ------------------------------------------------------------------------------------------    · Assessment		  73YOF w/o h/o CAD, s/p CABG Systolic CHF, CKD 3b, who p/w sob and pino.    - Acute on Chronic Systolic CHF Exacerbation:      - c/w diuresis as able per CCU      - c/w cardiac meds       - cardio/ CCU care and mgmt appreciated       tele monitoring     - post NSTEMI:      - Continue Current medications and monitor.       - adjust management per cards, otherwise c/w cardiac meds      - optimize renal function and fluid status, eventual cardiac cath      - tele    -  shoulder and foot pain     - appears to be arthritis related pain     - given recent aggressive diuresis, would check ESR, Uric acid     - shoulder Xray     - pain mgmt      - PT as able     - KI on CKD      - trend renal function closely on diuretics       - renal f/u and optimization / clearance if cath planned       - avoid nephrotoxic rx     - DM II       - c/w dm rx, as above      - monitor FS    -GI/DVT Prophylaxis.    --------------------------------------------  Case discussed with pt, RN, CCu team   Education given on findings and plan of care  ___________________________  TRINY Agosto D.O. ( covering Dr Velarde 4/28 & 29)   Pager: [phone]

## 2019-04-28 NOTE — PROGRESS NOTE ADULT - PROBLEM SELECTOR PLAN 3
- patient with SOB, +cough, CT with ground glass opacities   - levofloxacin (renally dosed) for CAP (currently day 3)

## 2019-04-29 LAB
ALBUMIN SERPL ELPH-MCNC: 3.6 G/DL — SIGNIFICANT CHANGE UP (ref 3.3–5)
ALP SERPL-CCNC: 60 U/L — SIGNIFICANT CHANGE UP (ref 40–120)
ALT FLD-CCNC: 12 U/L — SIGNIFICANT CHANGE UP (ref 4–33)
ANION GAP SERPL CALC-SCNC: 15 MMO/L — HIGH (ref 7–14)
ANION GAP SERPL CALC-SCNC: 16 MMO/L — HIGH (ref 7–14)
AST SERPL-CCNC: 17 U/L — SIGNIFICANT CHANGE UP (ref 4–32)
BILIRUB SERPL-MCNC: 0.4 MG/DL — SIGNIFICANT CHANGE UP (ref 0.2–1.2)
BUN SERPL-MCNC: 55 MG/DL — HIGH (ref 7–23)
BUN SERPL-MCNC: 60 MG/DL — HIGH (ref 7–23)
CALCIUM SERPL-MCNC: 9.7 MG/DL — SIGNIFICANT CHANGE UP (ref 8.4–10.5)
CALCIUM SERPL-MCNC: 9.9 MG/DL — SIGNIFICANT CHANGE UP (ref 8.4–10.5)
CHLORIDE SERPL-SCNC: 104 MMOL/L — SIGNIFICANT CHANGE UP (ref 98–107)
CHLORIDE SERPL-SCNC: 99 MMOL/L — SIGNIFICANT CHANGE UP (ref 98–107)
CO2 SERPL-SCNC: 19 MMOL/L — LOW (ref 22–31)
CO2 SERPL-SCNC: 22 MMOL/L — SIGNIFICANT CHANGE UP (ref 22–31)
CREAT SERPL-MCNC: 2.03 MG/DL — HIGH (ref 0.5–1.3)
CREAT SERPL-MCNC: 2.09 MG/DL — HIGH (ref 0.5–1.3)
GLUCOSE SERPL-MCNC: 113 MG/DL — HIGH (ref 70–99)
GLUCOSE SERPL-MCNC: 203 MG/DL — HIGH (ref 70–99)
HCT VFR BLD CALC: 32.1 % — LOW (ref 34.5–45)
HGB BLD-MCNC: 9.5 G/DL — LOW (ref 11.5–15.5)
MAGNESIUM SERPL-MCNC: 2.5 MG/DL — SIGNIFICANT CHANGE UP (ref 1.6–2.6)
MAGNESIUM SERPL-MCNC: 2.6 MG/DL — SIGNIFICANT CHANGE UP (ref 1.6–2.6)
MCHC RBC-ENTMCNC: 28.5 PG — SIGNIFICANT CHANGE UP (ref 27–34)
MCHC RBC-ENTMCNC: 29.6 % — LOW (ref 32–36)
MCV RBC AUTO: 96.4 FL — SIGNIFICANT CHANGE UP (ref 80–100)
NRBC # FLD: 0 K/UL — SIGNIFICANT CHANGE UP (ref 0–0)
PHOSPHATE SERPL-MCNC: 4.7 MG/DL — HIGH (ref 2.5–4.5)
PHOSPHATE SERPL-MCNC: 5.6 MG/DL — HIGH (ref 2.5–4.5)
PLATELET # BLD AUTO: 293 K/UL — SIGNIFICANT CHANGE UP (ref 150–400)
PMV BLD: 9 FL — SIGNIFICANT CHANGE UP (ref 7–13)
POTASSIUM SERPL-MCNC: 4.6 MMOL/L — SIGNIFICANT CHANGE UP (ref 3.5–5.3)
POTASSIUM SERPL-MCNC: 5.6 MMOL/L — HIGH (ref 3.5–5.3)
POTASSIUM SERPL-SCNC: 4.6 MMOL/L — SIGNIFICANT CHANGE UP (ref 3.5–5.3)
POTASSIUM SERPL-SCNC: 5.6 MMOL/L — HIGH (ref 3.5–5.3)
PROT SERPL-MCNC: 7.5 G/DL — SIGNIFICANT CHANGE UP (ref 6–8.3)
RBC # BLD: 3.33 M/UL — LOW (ref 3.8–5.2)
RBC # FLD: 16.6 % — HIGH (ref 10.3–14.5)
SODIUM SERPL-SCNC: 136 MMOL/L — SIGNIFICANT CHANGE UP (ref 135–145)
SODIUM SERPL-SCNC: 139 MMOL/L — SIGNIFICANT CHANGE UP (ref 135–145)
URATE SERPL-MCNC: 12.8 MG/DL — HIGH (ref 2.5–7)
WBC # BLD: 12.59 K/UL — HIGH (ref 3.8–10.5)
WBC # FLD AUTO: 12.59 K/UL — HIGH (ref 3.8–10.5)

## 2019-04-29 PROCEDURE — 99233 SBSQ HOSP IP/OBS HIGH 50: CPT

## 2019-04-29 PROCEDURE — 99232 SBSQ HOSP IP/OBS MODERATE 35: CPT | Mod: GC

## 2019-04-29 RX ORDER — BUDESONIDE AND FORMOTEROL FUMARATE DIHYDRATE 160; 4.5 UG/1; UG/1
2 AEROSOL RESPIRATORY (INHALATION)
Qty: 0 | Refills: 0 | Status: DISCONTINUED | OUTPATIENT
Start: 2019-04-29 | End: 2019-05-16

## 2019-04-29 RX ORDER — BUMETANIDE 0.25 MG/ML
2 INJECTION INTRAMUSCULAR; INTRAVENOUS EVERY 12 HOURS
Qty: 0 | Refills: 0 | Status: DISCONTINUED | OUTPATIENT
Start: 2019-04-29 | End: 2019-05-01

## 2019-04-29 RX ORDER — MONTELUKAST 4 MG/1
10 TABLET, CHEWABLE ORAL DAILY
Qty: 0 | Refills: 0 | Status: DISCONTINUED | OUTPATIENT
Start: 2019-04-29 | End: 2019-05-16

## 2019-04-29 RX ADMIN — GABAPENTIN 100 MILLIGRAM(S): 400 CAPSULE ORAL at 17:05

## 2019-04-29 RX ADMIN — BUMETANIDE 2 MILLIGRAM(S): 0.25 INJECTION INTRAMUSCULAR; INTRAVENOUS at 09:03

## 2019-04-29 RX ADMIN — Medication 3 MILLILITER(S): at 10:37

## 2019-04-29 RX ADMIN — Medication 10 MILLIGRAM(S): at 14:17

## 2019-04-29 RX ADMIN — HEPARIN SODIUM 5000 UNIT(S): 5000 INJECTION INTRAVENOUS; SUBCUTANEOUS at 14:17

## 2019-04-29 RX ADMIN — Medication 650 MILLIGRAM(S): at 06:01

## 2019-04-29 RX ADMIN — Medication 3 MILLILITER(S): at 04:17

## 2019-04-29 RX ADMIN — CHLORHEXIDINE GLUCONATE 1 APPLICATION(S): 213 SOLUTION TOPICAL at 11:49

## 2019-04-29 RX ADMIN — ATORVASTATIN CALCIUM 40 MILLIGRAM(S): 80 TABLET, FILM COATED ORAL at 22:18

## 2019-04-29 RX ADMIN — Medication 650 MILLIGRAM(S): at 22:24

## 2019-04-29 RX ADMIN — Medication 650 MILLIGRAM(S): at 08:04

## 2019-04-29 RX ADMIN — INSULIN GLARGINE 65 UNIT(S): 100 INJECTION, SOLUTION SUBCUTANEOUS at 22:18

## 2019-04-29 RX ADMIN — Medication 1 SPRAY(S): at 22:20

## 2019-04-29 RX ADMIN — SENNA PLUS 2 TABLET(S): 8.6 TABLET ORAL at 22:20

## 2019-04-29 RX ADMIN — Medication 10 MILLIGRAM(S): at 22:19

## 2019-04-29 RX ADMIN — Medication 50 MICROGRAM(S): at 06:01

## 2019-04-29 RX ADMIN — HEPARIN SODIUM 5000 UNIT(S): 5000 INJECTION INTRAVENOUS; SUBCUTANEOUS at 06:02

## 2019-04-29 RX ADMIN — Medication 650 MILLIGRAM(S): at 22:54

## 2019-04-29 RX ADMIN — MONTELUKAST 10 MILLIGRAM(S): 4 TABLET, CHEWABLE ORAL at 14:17

## 2019-04-29 RX ADMIN — TICAGRELOR 90 MILLIGRAM(S): 90 TABLET ORAL at 06:01

## 2019-04-29 RX ADMIN — Medication 10 MILLIGRAM(S): at 06:00

## 2019-04-29 RX ADMIN — PANTOPRAZOLE SODIUM 40 MILLIGRAM(S): 20 TABLET, DELAYED RELEASE ORAL at 06:00

## 2019-04-29 RX ADMIN — Medication 30 UNIT(S): at 18:06

## 2019-04-29 RX ADMIN — Medication 100 MILLIGRAM(S): at 06:00

## 2019-04-29 RX ADMIN — Medication 81 MILLIGRAM(S): at 11:50

## 2019-04-29 RX ADMIN — Medication 30 UNIT(S): at 07:56

## 2019-04-29 RX ADMIN — BUMETANIDE 2 MILLIGRAM(S): 0.25 INJECTION INTRAMUSCULAR; INTRAVENOUS at 22:19

## 2019-04-29 RX ADMIN — HEPARIN SODIUM 5000 UNIT(S): 5000 INJECTION INTRAVENOUS; SUBCUTANEOUS at 22:18

## 2019-04-29 RX ADMIN — Medication 1: at 07:57

## 2019-04-29 RX ADMIN — Medication 3 MILLILITER(S): at 17:01

## 2019-04-29 RX ADMIN — Medication 100 MILLIGRAM(S): at 17:05

## 2019-04-29 RX ADMIN — GABAPENTIN 100 MILLIGRAM(S): 400 CAPSULE ORAL at 06:00

## 2019-04-29 RX ADMIN — Medication 1 SPRAY(S): at 14:22

## 2019-04-29 RX ADMIN — Medication 3 MILLILITER(S): at 23:01

## 2019-04-29 RX ADMIN — Medication 1 SPRAY(S): at 06:01

## 2019-04-29 RX ADMIN — TICAGRELOR 90 MILLIGRAM(S): 90 TABLET ORAL at 17:06

## 2019-04-29 NOTE — PROGRESS NOTE ADULT - PROBLEM SELECTOR PLAN 3
- patient with SOB, +cough, CT with ground glass opacities   - levofloxacin (renally dosed) for CAP (currently day 3) - patient with SOB, +cough, CT with ground glass opacities   - levofloxacin (renally dosed) for CAP (currently day 4). Will do 5d course

## 2019-04-29 NOTE — PROGRESS NOTE ADULT - ATTENDING COMMENTS
AS ABOVE;  4/29: pt was started empirically on levofloxacin for pneumonia . doubt it: she does have mild leukocytosis but afebrile: CT scan suggestive of air trapping:

## 2019-04-29 NOTE — PROGRESS NOTE ADULT - ASSESSMENT
72 y/o female with pmhx of HTN, DM, CAD s/p CABG, HFrEF (LVEF 25%) severe MR, severe pulm HTN comes in with cough and SOB.   CT chest showed ground glass opacities, patient was started on IV zithromax, developed rash, now on Levaquin She was admitted to telemetry was given a dose of lopressor, patient became hypotensive and went into respiratory distress, patient was then moved to CCU. HF was consulted to help manage this patient who is SOB and is hypotensive. She admits to SOB at rest, orthopnea, cough. Denies fevers and sick contacts. No CP, palpitations, dizziness.

## 2019-04-29 NOTE — PROGRESS NOTE ADULT - SUBJECTIVE AND OBJECTIVE BOX
Infectious Diseases progress note:    Subjective:  No new fever.  Appears comfortable, no resp distress or bedside cough.      ROS:  CONSTITUTIONAL:  No fever, chills, rigors  CARDIOVASCULAR:  No chest pain or palpitations  RESPIRATORY:   No SOB, cough, dyspnea on exertion.  No wheezing  GASTROINTESTINAL:  No abd pain, N/V, diarrhea/constipation  EXTREMITIES:  No swelling or joint pain  GENITOURINARY:  No burning on urination, increased frequency or urgency.  No flank pain  NEUROLOGIC:  No HA, visual disturbances  SKIN: No rashes    Allergies    azithromycin (Hives; Pruritus)    Intolerances        ANTIBIOTICS/RELEVANT:  antimicrobials  levoFLOXacin  Tablet 500 milliGRAM(s) Oral every 48 hours    immunologic:  influenza   Vaccine 0.5 milliLiter(s) IntraMuscular once    OTHER:  acetaminophen   Tablet .. 650 milliGRAM(s) Oral every 6 hours PRN  ALBUTerol/ipratropium for Nebulization 3 milliLiter(s) Nebulizer every 6 hours  ALBUTerol/ipratropium for Nebulization. 3 milliLiter(s) Nebulizer once PRN  aspirin enteric coated 81 milliGRAM(s) Oral daily  atorvastatin 40 milliGRAM(s) Oral at bedtime  buDESOnide 160 MICROgram(s)/formoterol 4.5 MICROgram(s) Inhaler 2 Puff(s) Inhalation two times a day  buMETAnide Injectable 2 milliGRAM(s) IV Push every 12 hours  chlorhexidine 4% Liquid 1 Application(s) Topical <User Schedule>  dextrose 40% Gel 15 Gram(s) Oral once PRN  dextrose 5%. 1000 milliLiter(s) IV Continuous <Continuous>  dextrose 50% Injectable 12.5 Gram(s) IV Push once  dextrose 50% Injectable 25 Gram(s) IV Push once  dextrose 50% Injectable 25 Gram(s) IV Push once  diphenhydrAMINE 25 milliGRAM(s) Oral every 4 hours PRN  docusate sodium 100 milliGRAM(s) Oral two times a day  gabapentin 100 milliGRAM(s) Oral two times a day  glucagon  Injectable 1 milliGRAM(s) IntraMuscular once PRN  heparin  Injectable 5000 Unit(s) SubCutaneous every 8 hours  hydrALAZINE 10 milliGRAM(s) Oral every 8 hours  insulin glargine Injectable (LANTUS) 65 Unit(s) SubCutaneous at bedtime  insulin lispro (HumaLOG) corrective regimen sliding scale   SubCutaneous three times a day before meals  insulin lispro (HumaLOG) corrective regimen sliding scale   SubCutaneous at bedtime  insulin lispro Injectable (HumaLOG) 30 Unit(s) SubCutaneous three times a day before meals  levothyroxine 50 MICROGram(s) Oral daily  montelukast 10 milliGRAM(s) Oral daily  ondansetron Injectable 4 milliGRAM(s) IV Push once PRN  pantoprazole    Tablet 40 milliGRAM(s) Oral before breakfast  senna 2 Tablet(s) Oral at bedtime  sodium chloride 0.65% Nasal 1 Spray(s) Both Nostrils three times a day  ticagrelor 90 milliGRAM(s) Oral two times a day      Objective:  Vital Signs Last 24 Hrs  T(C): 36.8 (29 Apr 2019 19:53), Max: 36.9 (29 Apr 2019 16:00)  T(F): 98.3 (29 Apr 2019 19:53), Max: 98.4 (29 Apr 2019 16:00)  HR: 95 (29 Apr 2019 21:00) (86 - 102)  BP: 113/60 (29 Apr 2019 21:00) (92/54 - 123/52)  BP(mean): 76 (29 Apr 2019 21:00) (63 - 81)  RR: 25 (29 Apr 2019 21:00) (14 - 28)  SpO2: 100% (29 Apr 2019 21:00) (96% - 100%)    PHYSICAL EXAM:  Constitutional:NAD  Eyes:MOHSEN, EOMI  Ear/Nose/Throat: no thrush, mucositis.  Moist mucous membranes	  Neck:no JVD, no lymphadenopathy, supple  Respiratory: CTA maribel  Cardiovascular: S1S2 RRR, no murmurs  Gastrointestinal:soft, nontender,  nondistended (+) BS  Extremities:no e/e/c  Skin:  no rashes, open wounds or ulcerations        LABS:                        9.5    12.59 )-----------( 293      ( 29 Apr 2019 04:30 )             32.1     04-29    136  |  99  |  60<H>  ----------------------------<  203<H>  5.6<H>   |  22  |  2.09<H>    Ca    9.7      29 Apr 2019 18:00  Phos  5.6     04-29  Mg     2.6     04-29    TPro  7.5  /  Alb  3.6  /  TBili  0.4  /  DBili  x   /  AST  17  /  ALT  12  /  AlkPhos  60  04-29          Procalcitonin, Serum: 0.13 (04-26 @ 11:36)                    MICROBIOLOGY:    Culture - Blood (04.25.19 @ 10:34)    Culture - Blood:   NO ORGANISMS ISOLATED  NO ORGANISMS ISOLATED AT 96 HOURS    Specimen Source: BLOOD VENOUS    Culture - Blood (04.25.19 @ 10:34)    Culture - Blood:   NO ORGANISMS ISOLATED  NO ORGANISMS ISOLATED AT 96 HOURS    Specimen Source: BLOOD PERIPHERAL          RADIOLOGY & ADDITIONAL STUDIES:    < from: Xray Foot AP + Lateral + Oblique, Bilat (04.27.19 @ 13:53) >  IMPRESSION:  No tracking soft tissue gas collections, radiopaque foreign bodies, or   gross radiographic evidence for osteomyelitis.    No fractures or dislocations.    Tarsometatarsal alignment maintained without evidence fora Lisfranc   injury.    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 Preserved remaining joint spaces   and no joint margin erosions.    Bilateral posterosuperior calcaneal Víctor deformities and tiny   bilateral plantar calcaneal enthesophytes.    Generalized osteopenia otherwise no discrete lytic or blastic lesions.    Scant vascular calcifications.    < end of copied text >

## 2019-04-29 NOTE — PROGRESS NOTE ADULT - SUBJECTIVE AND OBJECTIVE BOX
[facility]   MD RAHEEL SHAH MD ANGELA WONG, PA    TEL:  OFFICE: [phone]  DR SANTOYO CELL: [phone]  DR. NGUYEN CELL: [phone]  UMM KENDALL CELL: [phone]        Patient is a 73y old  Female who presents with a chief complaint of sob (29 Apr 2019 12:27)      Patient seen and examined at bedside. No chest pain/sob    VITALS:  T(F): 97.6 (04-29-19 @ 12:53), Max: 98.6 (04-28-19 @ 19:57)  HR: 90 (04-29-19 @ 15:00)  BP: 110/54 (04-29-19 @ 15:00)  RR: 23 (04-29-19 @ 15:00)  SpO2: 100% (04-29-19 @ 15:00)  Wt(kg): --    04-28 @ 07:01  -  04-29 @ 07:00  --------------------------------------------------------  IN: 110 mL / OUT: 1695 mL / NET: -1585 mL    04-29 @ 07:01  -  04-29 @ 15:58  --------------------------------------------------------  IN: 450 mL / OUT: 820 mL / NET: -370 mL          PHYSICAL EXAM:  Constitutional: NAD  Neck: No JVD  Respiratory: CTAB, no wheezes, rales or rhonchi  Cardiovascular: S1, S2, RRR  Gastrointestinal: BS+, soft, NT/ND  Extremities: No peripheral edema    Hospital Medications:   MEDICATIONS  (STANDING):  ALBUTerol/ipratropium for Nebulization 3 milliLiter(s) Nebulizer every 6 hours  aspirin enteric coated 81 milliGRAM(s) Oral daily  atorvastatin 40 milliGRAM(s) Oral at bedtime  buDESOnide 160 MICROgram(s)/formoterol 4.5 MICROgram(s) Inhaler 2 Puff(s) Inhalation two times a day  buMETAnide Injectable 2 milliGRAM(s) IV Push every 12 hours  chlorhexidine 4% Liquid 1 Application(s) Topical <User Schedule>  dextrose 5%. 1000 milliLiter(s) (50 mL/Hr) IV Continuous <Continuous>  dextrose 50% Injectable 12.5 Gram(s) IV Push once  dextrose 50% Injectable 25 Gram(s) IV Push once  dextrose 50% Injectable 25 Gram(s) IV Push once  docusate sodium 100 milliGRAM(s) Oral two times a day  gabapentin 100 milliGRAM(s) Oral two times a day  heparin  Injectable 5000 Unit(s) SubCutaneous every 8 hours  hydrALAZINE 10 milliGRAM(s) Oral every 8 hours  influenza   Vaccine 0.5 milliLiter(s) IntraMuscular once  insulin glargine Injectable (LANTUS) 65 Unit(s) SubCutaneous at bedtime  insulin lispro (HumaLOG) corrective regimen sliding scale   SubCutaneous three times a day before meals  insulin lispro (HumaLOG) corrective regimen sliding scale   SubCutaneous at bedtime  insulin lispro Injectable (HumaLOG) 30 Unit(s) SubCutaneous three times a day before meals  levoFLOXacin  Tablet 500 milliGRAM(s) Oral every 48 hours  levothyroxine 50 MICROGram(s) Oral daily  montelukast 10 milliGRAM(s) Oral daily  pantoprazole    Tablet 40 milliGRAM(s) Oral before breakfast  senna 2 Tablet(s) Oral at bedtime  sodium chloride 0.65% Nasal 1 Spray(s) Both Nostrils three times a day  ticagrelor 90 milliGRAM(s) Oral two times a day      LABS:  04-29    139  |  104  |  55<H>  ----------------------------<  113<H>  4.6   |  19<L>  |  2.03<H>    Ca    9.9      29 Apr 2019 04:30  Phos  4.7     04-29  Mg     2.5     04-29    TPro  7.5  /  Alb  3.6  /  TBili  0.4  /  DBili      /  AST  17  /  ALT  12  /  AlkPhos  60  04-29    Creatinine Trend: 2.03 <--, 2.02 <--, 1.98 <--, 2.06 <--, 1.97 <--, 2.06 <--, 1.92 <--, 1.89 <--, 1.91 <--, 2.21 <--    Phosphorus Level, Serum: 4.7 mg/dL (04-29 @ 04:30)  Albumin, Serum: 3.6 g/dL (04-29 @ 04:30)  Phosphorus Level, Serum: 3.7 mg/dL (04-28 @ 17:35)                              9.5    12.59 )-----------( 293      ( 29 Apr 2019 04:30 )             32.1     Urine Studies:  Urinalysis - [04-25-19 @ 09:30]      Color LIGHT YELLOW / Appearance CLEAR / SG 1.011 / pH 6.0      Gluc 70 / Ketone NEGATIVE  / Bili NEGATIVE / Urobili NORMAL       Blood NEGATIVE / Protein NEGATIVE / Leuk Est NEGATIVE / Nitrite NEGATIVE      RBC  / WBC  / Hyaline  / Gran  / Sq Epi  / Non Sq Epi  / Bacteria       .4 (Ca --)      [04-25-19 @ 05:45]   --  PTH 43.55 (Ca --)      [09-10-18 @ 07:15]   --  PTH 36.60 (Ca --)      [09-08-18 @ 03:15]   --  Vitamin D (25OH) 15.8      [09-08-18 @ 03:15]  HbA1c 7.3      [04-25-19 @ 05:45]  TSH 1.59      [04-25-19 @ 05:45]  Lipid: chol 127, , HDL 22, LDL 67      [04-24-19 @ 12:21]    HCV 0.06, Nonreactive Hepatitis C AB  S/CO Ratio                        Interpretation  < 1.00                                   Non-Reactive  1.00 - 4.99                         Weakly-Reactive  > 5.00                                Reactive  Non-Reactive: A person with a non-reactive HCV antibody  result is considered uninfected.  No further action is  needed unless recent infection is suspected.  In these  cases, consider repeat testing later to detect  seroconversion..  Weakly-Reactive: HCV antibody test is abnormal, HCV RNA  Qualitative test will follow.  Reactive: HCV antibody test is abnormal, HCV RNA  Qualitative test will follow.  Note: HCV antibody testing is performed on the [employer]   system.      [04-25-19 @ 05:45]      RADIOLOGY & ADDITIONAL STUDIES:

## 2019-04-29 NOTE — PROGRESS NOTE ADULT - SUBJECTIVE AND OBJECTIVE BOX
Patient is a 73y old  Female who presents with a chief complaint of sob (29 Apr 2019 12:14)      Any change in ROS: pt is lying down in bed: seems to be doing ok : no acute SOB : has some epistaxis today with minor hemoptysis ? secondary to epistaxis    MEDICATIONS  (STANDING):  ALBUTerol/ipratropium for Nebulization 3 milliLiter(s) Nebulizer every 6 hours  aspirin enteric coated 81 milliGRAM(s) Oral daily  atorvastatin 40 milliGRAM(s) Oral at bedtime  buMETAnide Injectable 2 milliGRAM(s) IV Push every 12 hours  chlorhexidine 4% Liquid 1 Application(s) Topical <User Schedule>  dextrose 5%. 1000 milliLiter(s) (50 mL/Hr) IV Continuous <Continuous>  dextrose 50% Injectable 12.5 Gram(s) IV Push once  dextrose 50% Injectable 25 Gram(s) IV Push once  dextrose 50% Injectable 25 Gram(s) IV Push once  docusate sodium 100 milliGRAM(s) Oral two times a day  gabapentin 100 milliGRAM(s) Oral two times a day  heparin  Injectable 5000 Unit(s) SubCutaneous every 8 hours  hydrALAZINE 10 milliGRAM(s) Oral every 8 hours  influenza   Vaccine 0.5 milliLiter(s) IntraMuscular once  insulin glargine Injectable (LANTUS) 65 Unit(s) SubCutaneous at bedtime  insulin lispro (HumaLOG) corrective regimen sliding scale   SubCutaneous three times a day before meals  insulin lispro (HumaLOG) corrective regimen sliding scale   SubCutaneous at bedtime  insulin lispro Injectable (HumaLOG) 30 Unit(s) SubCutaneous three times a day before meals  levoFLOXacin  Tablet 500 milliGRAM(s) Oral every 48 hours  levothyroxine 50 MICROGram(s) Oral daily  pantoprazole    Tablet 40 milliGRAM(s) Oral before breakfast  senna 2 Tablet(s) Oral at bedtime  sodium chloride 0.65% Nasal 1 Spray(s) Both Nostrils three times a day  ticagrelor 90 milliGRAM(s) Oral two times a day    MEDICATIONS  (PRN):  acetaminophen   Tablet .. 650 milliGRAM(s) Oral every 6 hours PRN Mild Pain (1 - 3), Moderate Pain (4 - 6), Severe Pain (7 - 10)  ALBUTerol/ipratropium for Nebulization. 3 milliLiter(s) Nebulizer once PRN Shortness of Breath  dextrose 40% Gel 15 Gram(s) Oral once PRN Blood Glucose LESS THAN 70 milliGRAM(s)/deciliter  diphenhydrAMINE 25 milliGRAM(s) Oral every 4 hours PRN Rash and/or Itching  glucagon  Injectable 1 milliGRAM(s) IntraMuscular once PRN Glucose LESS THAN 70 milligrams/deciliter  ondansetron Injectable 4 milliGRAM(s) IV Push once PRN Nausea and/or Vomiting    Vital Signs Last 24 Hrs  T(C): 36.7 (29 Apr 2019 12:00), Max: 37 (28 Apr 2019 19:57)  T(F): 98 (29 Apr 2019 12:00), Max: 98.6 (28 Apr 2019 19:57)  HR: 93 (29 Apr 2019 12:00) (86 - 105)  BP: 103/59 (29 Apr 2019 12:00) (92/54 - 132/63)  BP(mean): 73 (29 Apr 2019 12:00) (63 - 82)  RR: 19 (29 Apr 2019 12:00) (14 - 29)  SpO2: 99% (29 Apr 2019 12:00) (96% - 100%)    I&O's Summary    28 Apr 2019 07:01  -  29 Apr 2019 07:00  --------------------------------------------------------  IN: 110 mL / OUT: 1695 mL / NET: -1585 mL    29 Apr 2019 07:01  -  29 Apr 2019 12:28  --------------------------------------------------------  IN: 250 mL / OUT: 510 mL / NET: -260 mL          Physical Exam:   GENERAL: NAD, well-groomed, well-developed  HEENT: MOHSEN/   Atraumatic, Normocephalic  ENMT: No tonsillar erythema, exudates, or enlargement; Moist mucous membranes, Good dentition, No lesions  NECK: Supple, No JVD, Normal thyroid  CHEST/LUNG: biobasilr crackles   CVS: Regular rate and rhythm; No murmurs, rubs, or gallops  GI: : Soft, Nontender, Nondistended; Bowel sounds present  NERVOUS SYSTEM:  Alert & Oriented X3  EXTREMITIES:  Mild edema  LYMPH: No lymphadenopathy noted  SKIN: No rashes or lesions  ENDOCRINOLOGY: No Thyromegaly  PSYCH: Appropriate    Labs:  22                            9.5    12.59 )-----------( 293      ( 29 Apr 2019 04:30 )             32.1                         8.8    12.13 )-----------( 282      ( 28 Apr 2019 05:00 )             28.8                         9.1    11.57 )-----------( 301      ( 27 Apr 2019 03:12 )             29.7                         10.1   12.27 )-----------( 282      ( 26 Apr 2019 16:20 )             33.4                         8.4    10.62 )-----------( 246      ( 26 Apr 2019 12:48 )             29.0                         10.6   14.28 )-----------( 331      ( 26 Apr 2019 06:30 )             35.1                         10.5   10.43 )-----------( 303      ( 25 Apr 2019 21:35 )             33.2     04-29    139  |  104  |  55<H>  ----------------------------<  113<H>  4.6   |  19<L>  |  2.03<H>  04-28    145  |  107  |  57<H>  ----------------------------<  150<H>  4.3   |  19<L>  |  2.02<H>  04-28    139  |  105  |  56<H>  ----------------------------<  224<H>  4.4   |  18<L>  |  1.98<H>  04-27    141  |  103  |  57<H>  ----------------------------<  167<H>  3.8   |  20<L>  |  2.06<H>  04-27    140  |  104  |  56<H>  ----------------------------<  181<H>  3.6   |  19<L>  |  1.97<H>  04-26    139  |  102  |  60<H>  ----------------------------<  155<H>  3.9   |  19<L>  |  2.06<H>  04-26    136  |  99  |  55<H>  ----------------------------<  266<H>  3.9   |  19<L>  |  1.92<H>  04-26    142  |  106  |  57<H>  ----------------------------<  98  3.9   |  16<L>  |  1.89<H>    Ca    9.9      29 Apr 2019 04:30  Ca    10.7<H>      28 Apr 2019 17:35  Ca    9.9      28 Apr 2019 05:00  Ca    9.5      27 Apr 2019 18:01  Phos  4.7     04-29  Phos  3.7     04-28  Phos  4.8     04-28  Phos  3.3     04-27  Mg     2.5     04-29  Mg     2.5     04-28  Mg     2.4     04-28  Mg     2.3     04-27    TPro  7.5  /  Alb  3.6  /  TBili  0.4  /  DBili  x   /  AST  17  /  ALT  12  /  AlkPhos  60  04-29  TPro  7.6  /  Alb  3.8  /  TBili  0.3  /  DBili  x   /  AST  12  /  ALT  7   /  AlkPhos  60  04-28  TPro  7.9  /  Alb  4.1  /  TBili  0.3  /  DBili  x   /  AST  14  /  ALT  8   /  AlkPhos  63  04-27  TPro  7.4  /  Alb  3.7  /  TBili  0.3  /  DBili  x   /  AST  14  /  ALT  9   /  AlkPhos  61  04-27  TPro  7.6  /  Alb  3.7  /  TBili  0.3  /  DBili  x   /  AST  17  /  ALT  11  /  AlkPhos  65  04-26  TPro  7.6  /  Alb  3.9  /  TBili  0.3  /  DBili  x   /  AST  16  /  ALT  9   /  AlkPhos  66  04-26    CAPILLARY BLOOD GLUCOSE      POCT Blood Glucose.: 131 mg/dL (29 Apr 2019 12:21)  POCT Blood Glucose.: 83 mg/dL (29 Apr 2019 12:02)  POCT Blood Glucose.: 47 mg/dL (29 Apr 2019 11:41)  POCT Blood Glucose.: 153 mg/dL (29 Apr 2019 07:53)  POCT Blood Glucose.: 136 mg/dL (28 Apr 2019 21:55)  POCT Blood Glucose.: 140 mg/dL (28 Apr 2019 17:07)      LIVER FUNCTIONS - ( 29 Apr 2019 04:30 )  Alb: 3.6 g/dL / Pro: 7.5 g/dL / ALK PHOS: 60 u/L / ALT: 12 u/L / AST: 17 u/L / GGT: x               D-Dimer Assay, Quantitative: 385 ng/mL (04-24 @ 13:50)  Procalcitonin, Serum: 0.13 ng/mL (04-26 @ 11:36)  Lactate, Blood: 2.0 mmol/L (04-27 @ 03:12)  Lactate, Blood: 1.5 mmol/L (04-26 @ 16:30)        RECENT CULTURES:  04-25 @ 10:34 BLOOD PERIPHERAL       < from: US Duplex Venous Lower Ext Complete, Bilateral (04.26.19 @ 09:59) >  TATION:  CLINICAL INFORMATION: Chest pain.  Pleurodynia    COMPARISON: None available.    TECHNIQUE: Duplex sonography of the BILATERAL LOWER extremities with  color and spectral Doppler, with and without compression.      FINDINGS:    There is normal compressibility of the bilateral common femoral, femoral   and popliteal veins. No calf vein thrombosis is detected.    Doppler examination shows normal spontaneous and phasic flow.    IMPRESSION:     No evidence of bilateral lower extremity deep venous thrombosis.        < from: CT Chest No Cont (04.25.19 @ 20:27) >  and expiratory phase of the imaging.    AIRWAYS, LUNGS, PLEURA: Patent central airways. No bronchial wall   thickening or bronchiectasis.    Mosaic attenuation of the lungs.    No consolidation.    Trace layering bilateral pleural effusions.    MEDIASTINUM, LYMPH NODES: No lymphadenopathy.    HEART AND VASCULATURE: The heart is mildly enlarged without pericardial   effusion. Thoracic aorta and pulmonary artery normal in course and   caliber. CABG.    UPPER ABDOMEN: Within normal limits.    BONES AND SOFT TISSUES: No aggressive osseous lesion. Degenerative   changes of the spine. Fatty atrophy of the paraspinous muscles.    LOWER NECK: Within normal limits.    IMPRESSION:     Mosaic attenuation of the lungs. This finding is indeterminant and may be   secondary to air trapping, pneumonitis or atypical infection.                  DIMITRIOS FRAZIER M.D., ATTENDING RADIOLOGIST  This document has been electronically signed. Apr 26 2019  9:02AM        < end of copied text >                MT HERNANDEZ M.D., ATTENDING RADIOLOGIST  This document has been electronically signed. Apr 26 2019 11:01AM    < end of copied text >             NO ORGANISMS ISOLATED  NO ORGANISMS ISOLATED AT 96 HOURS        RESPIRATORY CULTURES:          Studies  Chest X-RAY  CT SCAN Chest   Venous Dopplers: LE:   CT Abdomen  Others

## 2019-04-29 NOTE — PROGRESS NOTE ADULT - PROBLEM SELECTOR PLAN 1
to me ct scan looks more like air trapping: SHE IS NOT WHEEZING: BUT DON'T HAVE HER pft : WOULD SUGGEST TO STARTS SYMBICORT AS WELL AS SINGULAIR Daily: doubt pneumonia

## 2019-04-29 NOTE — PROGRESS NOTE ADULT - ATTENDING COMMENTS
Briefly, 72 y/o female with h/o HTN, DM, CAD s/p CABG (w/ prior PCI to Ramus; LIMA-LAD patent), HFrEF (LVEF 25%, LVEDD 5.2 cm), likely mod-severe MR, severe pulm HTN comes in with cough and SOB. CT chest showed ground glass opacities, patient was started on IV zithromax, developed rash, now on Levaquin. She was admitted to telemetry was given a dose of lopressor, patient became hypotensive and went into respiratory distress, patient was then moved to CCU. HF was consulted to help manage this patient who is SOB and is hypotensive. Diuresed with improvement. Developed b/l feet pain (? gout). On exam, NAD, JVP approx 12 cm with mild HJR, abdom nontender, no pedal edema, WWP. Labs reviewed. EKG reviewed. TTE - EF 25%, LVEDD 5.2 cm, mod MR (? underestimated; likely ischemic). Overall stage C HF, NYHA class IV w/ possible pulmonary edema 2/2 ischemic MR  - diuresis as above  - increase hydral to 20 mg q8h tomorrow (hold SBP <90)  - hold BB for now  - consider LV when more stable to assess MR severity    Critical care time = 60 min

## 2019-04-29 NOTE — PROGRESS NOTE ADULT - ASSESSMENT
73YOF w/o h/o CAD, s/p CABG CHF adm to [facility] for Acute on Chronic systolic and diastolic HF

## 2019-04-29 NOTE — PROGRESS NOTE ADULT - PROBLEM SELECTOR PLAN 2
4/27 chest pain is stable. on dual antiplatelet therapy. management per cardiology  4/28 per cardiology some point heart cath  4/29: per cardiology : she is on diuretics!

## 2019-04-29 NOTE — PROGRESS NOTE ADULT - PROBLEM SELECTOR PLAN 1
Mild- moderately elevated JVP. Severe LV dysfunction. Severe pulm HTN. mod-severe MR.   D/C lasix. Switch to Bumex 2 mg IV q 12 hrs   Not on ACEI/ARB/ARNI due to MERVIN.   Continue hydral 10 mg po TID  Strict I/O and daily standing weights.   Not on BB currently, ok to keep holding.

## 2019-04-29 NOTE — PROGRESS NOTE ADULT - PROBLEM SELECTOR PLAN 9
DVT PPx: heparin 5000 TID   Diet: fluid restriction, renal/diabetic/DASH diet  Dispo: pending medical management DVT PPx: heparin 5000 TID   Diet: fluid restriction, renal/diabetic/DASH diet  Dispo: pending medical management of ADHF

## 2019-04-29 NOTE — PROGRESS NOTE ADULT - ASSESSMENT
73YOF with hx of CAD s/p CABG, hypothyroidism, CKD, CHF, HTN, DM p/w worsening ZEE and sob. Patient usually can walk up a few steps before feeling sob, currently feeling sob after walking from living room to kitchen.  She is using 3 pillows to sleep. No cough, fevers. (+) CP, SS and constant, She experienced more ZEE for last two days.  She has no edema in lower extremity.  Last admission to [facility] was 11/18. (24 Apr 2019 10:21)    ER vss.  Pt afebrile.  WBC 8.7 --> 10.7.  UA (-), RVP (-).  4/25 cxr with L hazy retrocardiac opacity.   Pt found to have NSTEMI and gross fluid overload, started on IV lasix.  She is pending LHC.        ID consult called for further abx managment and evaluation for pna.       Recommend:    - Pt with ZEE/SOB, (+) NSTEMI.  Cxr with L hazy opacity.  Pt w/o cough or fever to suggest pna on clinical basis.  CT chest shows mosaic attentuation/pneumonitis/atypical infection.       - Azithro d/c'd due to allergic reaction. Legionella Ag neg.  Pct is mildly elevated.      - Check repeat cxr following diuresis, evaluate for developing consolidations/pna.  Abx changed to levaquin by primary team.  Recommend checking serial EKGs and Qtc interval (last Qtc = 504) while pt on fluroquinolone.   Complete 7 day course on 5/2     - Pt transferred to CCU for continued managment of acute decompensated CHF.     - Plan for eventual LHC when medically optimized.    Will follow,    Joselin Roman  [phone]

## 2019-04-29 NOTE — PROGRESS NOTE ADULT - ASSESSMENT
_________________________________________________________________________________________  ========>>  M E D I C A L   A T T E N D I N G    F O L L O W  U P  N O T E  <<=========  			( covering Dr Velarde 4/28 & 29)   -----------------------------------------------------------------------------------------------------    - Patient seen and examined by me earlier today.   - In summary,  SELVIN CLAIRE is a 73y year old woman who originally presented with SOB / CP  - Patient today overall doing fairly, comfortable, eating OK.     no SOB or CP reported     left shoulder and feet pain improved per Pt     ==================>> REVIEW OF SYSTEM <<=================    GEN: no fever, no chills, pain as above   RESP: no SOB at rest , no cough, no sputum  CVS: no chest pain, no palpitations  GI: no abdominal pain, no nausea  : no dysuria  Neuro: no headache, no dizziness  Derm : no itching, no rash    ==================>> PHYSICAL EXAM <<=================    GEN: A&O X 2 , NAD , comfortable in recliner , a bit forgetful at times   HEENT: NCAT, PERRL, MMM, hearing intact  Neck: supple , no JVD  CVS: S1S2 , regular , No M/R/G appreciated  PULM: CTA B/L,  limited, pt not taking deep breath   ABD.: soft. non tender, non distended,  bowel sounds present  Extrem: intact pulses , no significant edema   PSYCH : normal mood,  not anxious       ==================>> MEDICATIONS <<====================    ALBUTerol/ipratropium for Nebulization 3 milliLiter(s) Nebulizer every 6 hours  aspirin enteric coated 81 milliGRAM(s) Oral daily  atorvastatin 40 milliGRAM(s) Oral at bedtime  buMETAnide Injectable 2 milliGRAM(s) IV Push every 12 hours  chlorhexidine 4% Liquid 1 Application(s) Topical <User Schedule>  dextrose 5%. 1000 milliLiter(s) IV Continuous <Continuous>  dextrose 50% Injectable 12.5 Gram(s) IV Push once  dextrose 50% Injectable 25 Gram(s) IV Push once  dextrose 50% Injectable 25 Gram(s) IV Push once  docusate sodium 100 milliGRAM(s) Oral two times a day  gabapentin 100 milliGRAM(s) Oral two times a day  heparin  Injectable 5000 Unit(s) SubCutaneous every 8 hours  hydrALAZINE 10 milliGRAM(s) Oral every 8 hours  influenza   Vaccine 0.5 milliLiter(s) IntraMuscular once  insulin glargine Injectable (LANTUS) 65 Unit(s) SubCutaneous at bedtime  insulin lispro (HumaLOG) corrective regimen sliding scale   SubCutaneous three times a day before meals  insulin lispro (HumaLOG) corrective regimen sliding scale   SubCutaneous at bedtime  insulin lispro Injectable (HumaLOG) 30 Unit(s) SubCutaneous three times a day before meals  levoFLOXacin  Tablet 500 milliGRAM(s) Oral every 48 hours  levothyroxine 50 MICROGram(s) Oral daily  pantoprazole    Tablet 40 milliGRAM(s) Oral before breakfast  senna 2 Tablet(s) Oral at bedtime  sodium chloride 0.65% Nasal 1 Spray(s) Both Nostrils three times a day  ticagrelor 90 milliGRAM(s) Oral two times a day    MEDICATIONS  (PRN):  acetaminophen   Tablet .. 650 milliGRAM(s) Oral every 6 hours PRN Mild Pain (1 - 3), Moderate Pain (4 - 6), Severe Pain (7 - 10)  ALBUTerol/ipratropium for Nebulization. 3 milliLiter(s) Nebulizer once PRN Shortness of Breath  dextrose 40% Gel 15 Gram(s) Oral once PRN Blood Glucose LESS THAN 70 milliGRAM(s)/deciliter  diphenhydrAMINE 25 milliGRAM(s) Oral every 4 hours PRN Rash and/or Itching  glucagon  Injectable 1 milliGRAM(s) IntraMuscular once PRN Glucose LESS THAN 70 milligrams/deciliter  ondansetron Injectable 4 milliGRAM(s) IV Push once PRN Nausea and/or Vomiting    ==================>> VITAL SIGNS <<==================    Vital Signs Last 24 Hrs  T(C): 36.7 (04-29-19 @ 12:00)  T(F): 98 (04-29-19 @ 12:00), Max: 98.6 (04-28-19 @ 19:57)  HR: 93 (04-29-19 @ 12:00) (86 - 105)  BP: 103/59 (04-29-19 @ 12:00)  BP(mean): 73 (04-29-19 @ 12:00) (63 - 82)  RR: 19 (04-29-19 @ 12:00) (14 - 29)  SpO2: 99% (04-29-19 @ 12:00) (96% - 100%)      POCT Blood Glucose.: 131 mg/dL (29 Apr 2019 12:21)  POCT Blood Glucose.: 83 mg/dL (29 Apr 2019 12:02)  POCT Blood Glucose.: 47 mg/dL (29 Apr 2019 11:41)  POCT Blood Glucose.: 153 mg/dL (29 Apr 2019 07:53)  POCT Blood Glucose.: 136 mg/dL (28 Apr 2019 21:55)  POCT Blood Glucose.: 140 mg/dL (28 Apr 2019 17:07)     ==================>> LAB AND IMAGING <<==================                        9.5    12.59 )-----------( 293      ( 29 Apr 2019 04:30 )             32.1        Hemoglobin:   9.5 <<==,  8.8 <<==,  9.1 <<==,  10.1 <<==,  8.4 <<==,  10.6 <<==    139  |  104  |  55<H>  ----------------------------<  113<H>  4.6   |  19<L>  |  2.03<H>    Ca    9.9      29 Apr 2019 04:30  Phos  4.7     04-29  Mg     2.5     04-29    TPro  7.5  /  Alb  3.6  /  TBili  0.4  /  DBili  x   /  AST  17  /  ALT  12  /  AlkPhos  60  04-29    WBC count:   12.59 <<== ,  12.13 <<== ,  11.57 <<== ,  12.27 <<== ,  10.62 <<== ,  14.28 <<==   Hemoglobin:   9.5 <<==,  8.8 <<==,  9.1 <<==,  10.1 <<==,  8.4 <<==,  10.6 <<==  platelets:  293 <==, 282 <==, 301 <==, 282 <==, 246 <==, 331 <==, 303 <==    Creatinine:  2.03  <<==, 2.02  <<==, 1.98  <<==, 2.06  <<==, 1.97  <<==, 2.06  <<==  Sodium:   139  <==, 145  <==, 139  <==, 141  <==, 140  <==, 139  <==, 136  <==       AST:          17 <== , 12 <== , 14 <== , 14 <== , 17 <==      ALT:        12  <== , 7  <== , 8  <== , 9  <== , 11  <==      AP:        60  <=, 60  <=, 63  <=, 61  <=, 65  <=     Bili:        0.4  <=, 0.3  <=, 0.3  <=, 0.3  <=, 0.3  <=    Uric Acid, Serum (04.29.19 @ 04:30)    Uric Acid, Serum: 12.8 mg/dL    Sedimentation Rate, Erythrocyte (10.19.17 @ 06:32)    Sedimentation Rate, Erythrocyte: 101 mm/hr    __________________________________________________________________________________  ===============>>  A S S E S S M E N T   A N D   P L A N <<===============  ------------------------------------------------------------------------------------------    · Assessment		  73YOF w/o h/o CAD, s/p CABG Systolic CHF, CKD 3b, who p/w sob and pino.    - Acute on Chronic Systolic CHF Exacerbation:      - c/w diuresis as able per CCU      - c/w cardiac meds       - cardio/ CCU care and mgmt appreciated       tele monitoring     - post NSTEMI:      - Continue Current medications and monitor.       - adjust management per cards, otherwise c/w cardiac meds      - optimize renal function and fluid status, eventual cardiac cath      - tele    -  shoulder and foot pain     - appears to be arthritis related pain and likely in part due to acute gout given elevated esr and uric acid     - would recomend tylenol only for now as pt's creatinine elevated ( limit NSAIDS)     - shoulder Xray pending      - pain mgmt      - PT as able      - would benefit from allopurinol at later time     - MERVIN on CKD      - trend renal function closely on diuretics       - renal f/u and optimization / clearance if cath planned       - avoid nephrotoxic rx     - DM II       - c/w dm rx, as above      - monitor FS closely given hypoglycemia        encourage Po intake     -GI/DVT Prophylaxis.    --------------------------------------------  Case discussed with pt  Education given on findings and plan of care  ___________________________  H. FELICITY Agosto ( covering Dr Velarde 4/28 & 29)   Pager: [phone]

## 2019-04-29 NOTE — PROGRESS NOTE ADULT - SUBJECTIVE AND OBJECTIVE BOX
INTERVAL HPI/OVERNIGHT EVENTS:    SUBJECTIVE: Patient seen and examined at bedside. Patient denies chest pain/shortness of breath/abdominal pain/nausea/vomiting/diarrhea.     ROS: otherwise negative    OBJECTIVE:    VITAL SIGNS:  ICU Vital Signs Last 24 Hrs  T(C): 36.7 (29 Apr 2019 08:00), Max: 37 (28 Apr 2019 19:57)  T(F): 98.1 (29 Apr 2019 08:00), Max: 98.6 (28 Apr 2019 19:57)  HR: 96 (29 Apr 2019 11:00) (86 - 105)  BP: 102/54 (29 Apr 2019 11:00) (92/54 - 132/63)  BP(mean): 68 (29 Apr 2019 11:00) (63 - 82)  ABP: --  ABP(mean): --  RR: 18 (29 Apr 2019 11:00) (14 - 29)  SpO2: 98% (29 Apr 2019 11:00) (96% - 100%)    04-28 @ 07:01 - 04-29 @ 07:00  --------------------------------------------------------  IN: 110 mL / OUT: 1695 mL / NET: -1585 mL    04-29 @ 07:01  -  04-29 @ 12:14  --------------------------------------------------------  IN: 0 mL / OUT: 510 mL / NET: -510 mL    CAPILLARY BLOOD GLUCOSE    POCT Blood Glucose.: 83 mg/dL (29 Apr 2019 12:02)    PHYSICAL EXAM:  General: _______ male, appearing stated age. No acute distress   HEENT: normocephalic, atraumatic. Moist mucous membranes   Eyes: clear conjunctiva, PERRL  Neck: supple  Respiratory: clear to auscultation bilaterally   Cardiovascular: S1, S2. Regular rate and rhythm. No murmurs/rubs/gallops  Abdomen: soft, nondistended, nontender. +BS  Extremities: WWP, 2+ peripheral pulses b/l; no LE edema  Skin: normal color and turgor; no rash  Neurological: A&Ox3     MEDICATIONS:  MEDICATIONS  (STANDING):  ALBUTerol/ipratropium for Nebulization 3 milliLiter(s) Nebulizer every 6 hours  aspirin enteric coated 81 milliGRAM(s) Oral daily  atorvastatin 40 milliGRAM(s) Oral at bedtime  buMETAnide Injectable 2 milliGRAM(s) IV Push every 12 hours  chlorhexidine 4% Liquid 1 Application(s) Topical <User Schedule>  dextrose 5%. 1000 milliLiter(s) (50 mL/Hr) IV Continuous <Continuous>  dextrose 50% Injectable 12.5 Gram(s) IV Push once  dextrose 50% Injectable 25 Gram(s) IV Push once  dextrose 50% Injectable 25 Gram(s) IV Push once  docusate sodium 100 milliGRAM(s) Oral two times a day  gabapentin 100 milliGRAM(s) Oral two times a day  heparin  Injectable 5000 Unit(s) SubCutaneous every 8 hours  hydrALAZINE 10 milliGRAM(s) Oral every 8 hours  influenza   Vaccine 0.5 milliLiter(s) IntraMuscular once  insulin glargine Injectable (LANTUS) 65 Unit(s) SubCutaneous at bedtime  insulin lispro (HumaLOG) corrective regimen sliding scale   SubCutaneous three times a day before meals  insulin lispro (HumaLOG) corrective regimen sliding scale   SubCutaneous at bedtime  insulin lispro Injectable (HumaLOG) 30 Unit(s) SubCutaneous three times a day before meals  levoFLOXacin  Tablet 500 milliGRAM(s) Oral every 48 hours  levothyroxine 50 MICROGram(s) Oral daily  pantoprazole    Tablet 40 milliGRAM(s) Oral before breakfast  senna 2 Tablet(s) Oral at bedtime  sodium chloride 0.65% Nasal 1 Spray(s) Both Nostrils three times a day  ticagrelor 90 milliGRAM(s) Oral two times a day    MEDICATIONS  (PRN):  acetaminophen   Tablet .. 650 milliGRAM(s) Oral every 6 hours PRN Mild Pain (1 - 3), Moderate Pain (4 - 6), Severe Pain (7 - 10)  ALBUTerol/ipratropium for Nebulization. 3 milliLiter(s) Nebulizer once PRN Shortness of Breath  dextrose 40% Gel 15 Gram(s) Oral once PRN Blood Glucose LESS THAN 70 milliGRAM(s)/deciliter  diphenhydrAMINE 25 milliGRAM(s) Oral every 4 hours PRN Rash and/or Itching  glucagon  Injectable 1 milliGRAM(s) IntraMuscular once PRN Glucose LESS THAN 70 milligrams/deciliter  ondansetron Injectable 4 milliGRAM(s) IV Push once PRN Nausea and/or Vomiting    ALLERGIES:  Allergies    azithromycin (Hives; Pruritus)    Intolerances    LABS:                        9.5    12.59 )-----------( 293      ( 29 Apr 2019 04:30 )             32.1     04-29    139  |  104  |  55<H>  ----------------------------<  113<H>  4.6   |  19<L>  |  2.03<H>    Ca    9.9      29 Apr 2019 04:30  Phos  4.7     04-29  Mg     2.5     04-29    TPro  7.5  /  Alb  3.6  /  TBili  0.4  /  DBili  x   /  AST  17  /  ALT  12  /  AlkPhos  60  04-29          RADIOLOGY & ADDITIONAL TESTS: Reviewed. INTERVAL HPI/OVERNIGHT EVENTS: no acute events overnight     SUBJECTIVE: Patient seen and examined at bedside. Patient reports that her breathing has improved. Patient denies chest pain/shortness of breath/abdominal pain/nausea/vomiting.    ROS: otherwise negative    OBJECTIVE:    VITAL SIGNS:  ICU Vital Signs Last 24 Hrs  T(C): 36.7 (29 Apr 2019 08:00), Max: 37 (28 Apr 2019 19:57)  T(F): 98.1 (29 Apr 2019 08:00), Max: 98.6 (28 Apr 2019 19:57)  HR: 96 (29 Apr 2019 11:00) (86 - 105)  BP: 102/54 (29 Apr 2019 11:00) (92/54 - 132/63)  BP(mean): 68 (29 Apr 2019 11:00) (63 - 82)  RR: 18 (29 Apr 2019 11:00) (14 - 29)  SpO2: 98% (29 Apr 2019 11:00) (96% - 100%)    04-28 @ 07:01 - 04-29 @ 07:00  --------------------------------------------------------  IN: 110 mL / OUT: 1695 mL / NET: -1585 mL    04-29 @ 07:01 - 04-29 @ 12:14  --------------------------------------------------------  IN: 0 mL / OUT: 510 mL / NET: -510 mL    CAPILLARY BLOOD GLUCOSE    POCT Blood Glucose.: 83 mg/dL (29 Apr 2019 12:02)    PHYSICAL EXAM:  General: elderly female, appearing stated age. Breathing well    HEENT: normocephalic, atraumatic. Moist mucous membranes   Eyes: clear conjunctiva, PERRL  Neck: supple. JVD to mandible.   Respiratory: Vertical scar with keloid formation. Bibasilar crackles. Clear to auscultation anteriorly. Breathing well on 3L.   Cardiovascular: S1, S2. Regular rate and rhythm. No murmurs/rubs/gallops  Abdomen: soft, nondistended, nontender. +BS  Extremities: WWP, 2+ peripheral pulses b/l; no LE edema. Dorsum of left feet tender to palpation. Left shoulder tender    Skin: normal color and turgor; no rash  Neurological: awake and alert     MEDICATIONS:  MEDICATIONS  (STANDING):  ALBUTerol/ipratropium for Nebulization 3 milliLiter(s) Nebulizer every 6 hours  aspirin enteric coated 81 milliGRAM(s) Oral daily  atorvastatin 40 milliGRAM(s) Oral at bedtime  buMETAnide Injectable 2 milliGRAM(s) IV Push every 12 hours  chlorhexidine 4% Liquid 1 Application(s) Topical <User Schedule>  dextrose 5%. 1000 milliLiter(s) (50 mL/Hr) IV Continuous <Continuous>  dextrose 50% Injectable 12.5 Gram(s) IV Push once  dextrose 50% Injectable 25 Gram(s) IV Push once  dextrose 50% Injectable 25 Gram(s) IV Push once  docusate sodium 100 milliGRAM(s) Oral two times a day  gabapentin 100 milliGRAM(s) Oral two times a day  heparin  Injectable 5000 Unit(s) SubCutaneous every 8 hours  hydrALAZINE 10 milliGRAM(s) Oral every 8 hours  influenza   Vaccine 0.5 milliLiter(s) IntraMuscular once  insulin glargine Injectable (LANTUS) 65 Unit(s) SubCutaneous at bedtime  insulin lispro (HumaLOG) corrective regimen sliding scale   SubCutaneous three times a day before meals  insulin lispro (HumaLOG) corrective regimen sliding scale   SubCutaneous at bedtime  insulin lispro Injectable (HumaLOG) 30 Unit(s) SubCutaneous three times a day before meals  levoFLOXacin  Tablet 500 milliGRAM(s) Oral every 48 hours  levothyroxine 50 MICROGram(s) Oral daily  pantoprazole    Tablet 40 milliGRAM(s) Oral before breakfast  senna 2 Tablet(s) Oral at bedtime  sodium chloride 0.65% Nasal 1 Spray(s) Both Nostrils three times a day  ticagrelor 90 milliGRAM(s) Oral two times a day    MEDICATIONS  (PRN):  acetaminophen   Tablet .. 650 milliGRAM(s) Oral every 6 hours PRN Mild Pain (1 - 3), Moderate Pain (4 - 6), Severe Pain (7 - 10)  ALBUTerol/ipratropium for Nebulization. 3 milliLiter(s) Nebulizer once PRN Shortness of Breath  dextrose 40% Gel 15 Gram(s) Oral once PRN Blood Glucose LESS THAN 70 milliGRAM(s)/deciliter  diphenhydrAMINE 25 milliGRAM(s) Oral every 4 hours PRN Rash and/or Itching  glucagon  Injectable 1 milliGRAM(s) IntraMuscular once PRN Glucose LESS THAN 70 milligrams/deciliter  ondansetron Injectable 4 milliGRAM(s) IV Push once PRN Nausea and/or Vomiting    ALLERGIES:  Allergies    azithromycin (Hives; Pruritus)    Intolerances    LABS:                        9.5    12.59 )-----------( 293      ( 29 Apr 2019 04:30 )             32.1     04-29    139  |  104  |  55<H>  ----------------------------<  113<H>  4.6   |  19<L>  |  2.03<H>    Ca    9.9      29 Apr 2019 04:30  Phos  4.7     04-29  Mg     2.5     04-29    TPro  7.5  /  Alb  3.6  /  TBili  0.4  /  DBili  x   /  AST  17  /  ALT  12  /  AlkPhos  60  04-29    RADIOLOGY & ADDITIONAL TESTS: Reviewed. INTERVAL HPI/OVERNIGHT EVENTS: no acute events overnight     SUBJECTIVE: Patient seen and examined at bedside. Patient reports that her breathing has improved. Patient denies chest pain/shortness of breath/abdominal pain/nausea/vomiting.    ROS: otherwise negative    OBJECTIVE:    VITAL SIGNS:  ICU Vital Signs Last 24 Hrs  T(C): 36.7 (29 Apr 2019 08:00), Max: 37 (28 Apr 2019 19:57)  T(F): 98.1 (29 Apr 2019 08:00), Max: 98.6 (28 Apr 2019 19:57)  HR: 96 (29 Apr 2019 11:00) (86 - 105)  BP: 102/54 (29 Apr 2019 11:00) (92/54 - 132/63)  BP(mean): 68 (29 Apr 2019 11:00) (63 - 82)  RR: 18 (29 Apr 2019 11:00) (14 - 29)  SpO2: 98% (29 Apr 2019 11:00) (96% - 100%)    04-28 @ 07:01 - 04-29 @ 07:00  --------------------------------------------------------  IN: 110 mL / OUT: 1695 mL / NET: -1585 mL    04-29 @ 07:01 - 04-29 @ 12:14  --------------------------------------------------------  IN: 0 mL / OUT: 510 mL / NET: -510 mL    CAPILLARY BLOOD GLUCOSE    POCT Blood Glucose.: 83 mg/dL (29 Apr 2019 12:02)    PHYSICAL EXAM:  General: elderly female, appearing stated age. Breathing well    HEENT: normocephalic, atraumatic. Moist mucous membranes   Eyes: clear conjunctiva, PERRL  Neck: supple. JVD to mid neck    Respiratory: Vertical scar with keloid formation. Bibasilar crackles, improved since yesterday. Clear to auscultation anteriorly. Breathing well on 3L.   Cardiovascular: S1, S2. Regular rate and rhythm. No murmurs/rubs/gallops  Abdomen: soft, nondistended, nontender. +BS  Extremities: WWP, 2+ peripheral pulses b/l; no LE edema. Dorsum of feet nontender to palpation. Left shoulder nontender    Skin: normal color and turgor; no rash  Neurological: awake and alert     MEDICATIONS:  MEDICATIONS  (STANDING):  ALBUTerol/ipratropium for Nebulization 3 milliLiter(s) Nebulizer every 6 hours  aspirin enteric coated 81 milliGRAM(s) Oral daily  atorvastatin 40 milliGRAM(s) Oral at bedtime  buMETAnide Injectable 2 milliGRAM(s) IV Push every 12 hours  chlorhexidine 4% Liquid 1 Application(s) Topical <User Schedule>  dextrose 5%. 1000 milliLiter(s) (50 mL/Hr) IV Continuous <Continuous>  dextrose 50% Injectable 12.5 Gram(s) IV Push once  dextrose 50% Injectable 25 Gram(s) IV Push once  dextrose 50% Injectable 25 Gram(s) IV Push once  docusate sodium 100 milliGRAM(s) Oral two times a day  gabapentin 100 milliGRAM(s) Oral two times a day  heparin  Injectable 5000 Unit(s) SubCutaneous every 8 hours  hydrALAZINE 10 milliGRAM(s) Oral every 8 hours  influenza   Vaccine 0.5 milliLiter(s) IntraMuscular once  insulin glargine Injectable (LANTUS) 65 Unit(s) SubCutaneous at bedtime  insulin lispro (HumaLOG) corrective regimen sliding scale   SubCutaneous three times a day before meals  insulin lispro (HumaLOG) corrective regimen sliding scale   SubCutaneous at bedtime  insulin lispro Injectable (HumaLOG) 30 Unit(s) SubCutaneous three times a day before meals  levoFLOXacin  Tablet 500 milliGRAM(s) Oral every 48 hours  levothyroxine 50 MICROGram(s) Oral daily  pantoprazole    Tablet 40 milliGRAM(s) Oral before breakfast  senna 2 Tablet(s) Oral at bedtime  sodium chloride 0.65% Nasal 1 Spray(s) Both Nostrils three times a day  ticagrelor 90 milliGRAM(s) Oral two times a day    MEDICATIONS  (PRN):  acetaminophen   Tablet .. 650 milliGRAM(s) Oral every 6 hours PRN Mild Pain (1 - 3), Moderate Pain (4 - 6), Severe Pain (7 - 10)  ALBUTerol/ipratropium for Nebulization. 3 milliLiter(s) Nebulizer once PRN Shortness of Breath  dextrose 40% Gel 15 Gram(s) Oral once PRN Blood Glucose LESS THAN 70 milliGRAM(s)/deciliter  diphenhydrAMINE 25 milliGRAM(s) Oral every 4 hours PRN Rash and/or Itching  glucagon  Injectable 1 milliGRAM(s) IntraMuscular once PRN Glucose LESS THAN 70 milligrams/deciliter  ondansetron Injectable 4 milliGRAM(s) IV Push once PRN Nausea and/or Vomiting    ALLERGIES:  Allergies    azithromycin (Hives; Pruritus)    Intolerances    LABS:                        9.5    12.59 )-----------( 293      ( 29 Apr 2019 04:30 )             32.1     04-29    139  |  104  |  55<H>  ----------------------------<  113<H>  4.6   |  19<L>  |  2.03<H>    Ca    9.9      29 Apr 2019 04:30  Phos  4.7     04-29  Mg     2.5     04-29    TPro  7.5  /  Alb  3.6  /  TBili  0.4  /  DBili  x   /  AST  17  /  ALT  12  /  AlkPhos  60  04-29    RADIOLOGY & ADDITIONAL TESTS: Reviewed.

## 2019-04-29 NOTE — PROGRESS NOTE ADULT - SUBJECTIVE AND OBJECTIVE BOX
Patient seen and examined. No complaints. No acute SOB, CP, palpitations.   Has mild b/l foot pain.     Medications:  acetaminophen   Tablet .. 650 milliGRAM(s) Oral every 6 hours PRN  ALBUTerol/ipratropium for Nebulization 3 milliLiter(s) Nebulizer every 6 hours  ALBUTerol/ipratropium for Nebulization. 3 milliLiter(s) Nebulizer once PRN  aspirin enteric coated 81 milliGRAM(s) Oral daily  atorvastatin 40 milliGRAM(s) Oral at bedtime  buDESOnide 160 MICROgram(s)/formoterol 4.5 MICROgram(s) Inhaler 2 Puff(s) Inhalation two times a day  buMETAnide Injectable 2 milliGRAM(s) IV Push every 12 hours  chlorhexidine 4% Liquid 1 Application(s) Topical <User Schedule>  dextrose 40% Gel 15 Gram(s) Oral once PRN  dextrose 5%. 1000 milliLiter(s) IV Continuous <Continuous>  dextrose 50% Injectable 12.5 Gram(s) IV Push once  dextrose 50% Injectable 25 Gram(s) IV Push once  dextrose 50% Injectable 25 Gram(s) IV Push once  diphenhydrAMINE 25 milliGRAM(s) Oral every 4 hours PRN  docusate sodium 100 milliGRAM(s) Oral two times a day  gabapentin 100 milliGRAM(s) Oral two times a day  glucagon  Injectable 1 milliGRAM(s) IntraMuscular once PRN  heparin  Injectable 5000 Unit(s) SubCutaneous every 8 hours  hydrALAZINE 10 milliGRAM(s) Oral every 8 hours  influenza   Vaccine 0.5 milliLiter(s) IntraMuscular once  insulin glargine Injectable (LANTUS) 65 Unit(s) SubCutaneous at bedtime  insulin lispro (HumaLOG) corrective regimen sliding scale   SubCutaneous three times a day before meals  insulin lispro (HumaLOG) corrective regimen sliding scale   SubCutaneous at bedtime  insulin lispro Injectable (HumaLOG) 30 Unit(s) SubCutaneous three times a day before meals  levoFLOXacin  Tablet 500 milliGRAM(s) Oral every 48 hours  levothyroxine 50 MICROGram(s) Oral daily  montelukast 10 milliGRAM(s) Oral daily  ondansetron Injectable 4 milliGRAM(s) IV Push once PRN  pantoprazole    Tablet 40 milliGRAM(s) Oral before breakfast  senna 2 Tablet(s) Oral at bedtime  sodium chloride 0.65% Nasal 1 Spray(s) Both Nostrils three times a day  ticagrelor 90 milliGRAM(s) Oral two times a day      Vitals:  Vital Signs Last 24 Hrs  T(C): 36.9 (29 Apr 2019 16:00), Max: 37 (28 Apr 2019 19:57)  T(F): 98.4 (29 Apr 2019 16:00), Max: 98.6 (28 Apr 2019 19:57)  HR: 90 (29 Apr 2019 16:00) (86 - 105)  BP: 111/58 (29 Apr 2019 16:00) (92/54 - 121/61)  BP(mean): 73 (29 Apr 2019 16:00) (63 - 80)  RR: 26 (29 Apr 2019 16:00) (14 - 27)  SpO2: 100% (29 Apr 2019 16:00) (96% - 100%)    Daily     Daily     I&O's Detail    28 Apr 2019 07:01  -  29 Apr 2019 07:00  --------------------------------------------------------  IN:    Oral Fluid: 110 mL  Total IN: 110 mL    OUT:    Indwelling Catheter - Urethral: 1695 mL  Total OUT: 1695 mL    Total NET: -1585 mL      29 Apr 2019 07:01  -  29 Apr 2019 16:36  --------------------------------------------------------  IN:    Oral Fluid: 450 mL  Total IN: 450 mL    OUT:    Indwelling Catheter - Urethral: 880 mL  Total OUT: 880 mL    Total NET: -430 mL          Physical Exam:     General: No distress. Comfortable.  HEENT: EOM intact.  Neck: Neck supple. JVP mildly elevated. No masses  Chest: Clear to auscultation bilaterally  CV: Normal S1 and S2. No murmurs, rub, or gallops. Radial pulses normal. No LE edema b/l.   Abdomen: Soft, non-distended, non-tender  Skin: No rashes or skin breakdown  Neurology: Alert and oriented times three. Sensation intact  Psych: Affect normal    Labs:                        9.5    12.59 )-----------( 293      ( 29 Apr 2019 04:30 )             32.1     04-29    139  |  104  |  55<H>  ----------------------------<  113<H>  4.6   |  19<L>  |  2.03<H>    Ca    9.9      29 Apr 2019 04:30  Phos  4.7     04-29  Mg     2.5     04-29    TPro  7.5  /  Alb  3.6  /  TBili  0.4  /  DBili  x   /  AST  17  /  ALT  12  /  AlkPhos  60  04-29

## 2019-04-29 NOTE — PROGRESS NOTE ADULT - PROBLEM SELECTOR PLAN 1
- lasix IV prn. Will give another 60mg IV now and monitor UO  - On TTE, Severe LV dysfunction. Severe pulm HTN. mod-severe MR.   - hold beta blockers in the setting of acute decompensated heart failure  - strict I/O  - No ACE/ARB given MERVIN   - started on hydralazine 5 mg q8h (hold for SBP <90) for afterload reduction; will increase to TID at next dose    - c/brad Oscar - patient still slightly volume up. Will do bumex 2mg and reassess   - On TTE, Severe LV dysfunction. Severe pulm HTN. mod-severe MR. Will do LV    - hold beta blockers in the setting of acute decompensated heart failure  - strict I/O  - No ACE/ARB given MERVIN   - increase hydralazine 5 mg q8h to hydralazine 10mg q8h  - c/w Dayne

## 2019-04-29 NOTE — PROGRESS NOTE ADULT - PROBLEM SELECTOR PLAN 6
- patient with CKD III, Baseline Scr 1.5-1.8  - presented with Scr 2.21, currently 1.97  - continue to diurese as tolerated   - daily BMPs. Daily weights    - nephro following   - avoid nephrotoxins - patient with CKD III, Baseline Scr 1.5-1.8  - presented with Scr 2.21, currently 2.03  - continue to diurese as tolerated   - daily BMPs. Daily weights    - nephro following   - avoid nephrotoxins

## 2019-04-30 LAB
ALBUMIN SERPL ELPH-MCNC: 3.9 G/DL — SIGNIFICANT CHANGE UP (ref 3.3–5)
ALP SERPL-CCNC: 61 U/L — SIGNIFICANT CHANGE UP (ref 40–120)
ALT FLD-CCNC: 14 U/L — SIGNIFICANT CHANGE UP (ref 4–33)
ANION GAP SERPL CALC-SCNC: 17 MMO/L — HIGH (ref 7–14)
ANION GAP SERPL CALC-SCNC: 18 MMO/L — HIGH (ref 7–14)
ANION GAP SERPL CALC-SCNC: 19 MMO/L — HIGH (ref 7–14)
AST SERPL-CCNC: 38 U/L — HIGH (ref 4–32)
BACTERIA BLD CULT: SIGNIFICANT CHANGE UP
BACTERIA BLD CULT: SIGNIFICANT CHANGE UP
BILIRUB SERPL-MCNC: 0.4 MG/DL — SIGNIFICANT CHANGE UP (ref 0.2–1.2)
BUN SERPL-MCNC: 60 MG/DL — HIGH (ref 7–23)
BUN SERPL-MCNC: 60 MG/DL — HIGH (ref 7–23)
BUN SERPL-MCNC: 67 MG/DL — HIGH (ref 7–23)
CALCIUM SERPL-MCNC: 9.6 MG/DL — SIGNIFICANT CHANGE UP (ref 8.4–10.5)
CALCIUM SERPL-MCNC: 9.8 MG/DL — SIGNIFICANT CHANGE UP (ref 8.4–10.5)
CALCIUM SERPL-MCNC: 9.8 MG/DL — SIGNIFICANT CHANGE UP (ref 8.4–10.5)
CHLORIDE SERPL-SCNC: 100 MMOL/L — SIGNIFICANT CHANGE UP (ref 98–107)
CHLORIDE SERPL-SCNC: 100 MMOL/L — SIGNIFICANT CHANGE UP (ref 98–107)
CHLORIDE SERPL-SCNC: 101 MMOL/L — SIGNIFICANT CHANGE UP (ref 98–107)
CO2 SERPL-SCNC: 21 MMOL/L — LOW (ref 22–31)
CO2 SERPL-SCNC: 21 MMOL/L — LOW (ref 22–31)
CO2 SERPL-SCNC: 22 MMOL/L — SIGNIFICANT CHANGE UP (ref 22–31)
CREAT SERPL-MCNC: 2.06 MG/DL — HIGH (ref 0.5–1.3)
CREAT SERPL-MCNC: 2.08 MG/DL — HIGH (ref 0.5–1.3)
CREAT SERPL-MCNC: 2.19 MG/DL — HIGH (ref 0.5–1.3)
GLUCOSE SERPL-MCNC: 133 MG/DL — HIGH (ref 70–99)
GLUCOSE SERPL-MCNC: 158 MG/DL — HIGH (ref 70–99)
GLUCOSE SERPL-MCNC: 174 MG/DL — HIGH (ref 70–99)
HCT VFR BLD CALC: 29.8 % — LOW (ref 34.5–45)
HGB BLD-MCNC: 9.1 G/DL — LOW (ref 11.5–15.5)
MAGNESIUM SERPL-MCNC: 2.5 MG/DL — SIGNIFICANT CHANGE UP (ref 1.6–2.6)
MAGNESIUM SERPL-MCNC: 2.6 MG/DL — SIGNIFICANT CHANGE UP (ref 1.6–2.6)
MAGNESIUM SERPL-MCNC: 2.7 MG/DL — HIGH (ref 1.6–2.6)
MCHC RBC-ENTMCNC: 28.5 PG — SIGNIFICANT CHANGE UP (ref 27–34)
MCHC RBC-ENTMCNC: 30.5 % — LOW (ref 32–36)
MCV RBC AUTO: 93.4 FL — SIGNIFICANT CHANGE UP (ref 80–100)
NRBC # FLD: 0 K/UL — SIGNIFICANT CHANGE UP (ref 0–0)
PHOSPHATE SERPL-MCNC: 4.1 MG/DL — SIGNIFICANT CHANGE UP (ref 2.5–4.5)
PHOSPHATE SERPL-MCNC: 4.7 MG/DL — HIGH (ref 2.5–4.5)
PHOSPHATE SERPL-MCNC: 4.9 MG/DL — HIGH (ref 2.5–4.5)
PLATELET # BLD AUTO: 307 K/UL — SIGNIFICANT CHANGE UP (ref 150–400)
PMV BLD: 9.1 FL — SIGNIFICANT CHANGE UP (ref 7–13)
POTASSIUM SERPL-MCNC: 4.2 MMOL/L — SIGNIFICANT CHANGE UP (ref 3.5–5.3)
POTASSIUM SERPL-MCNC: 4.7 MMOL/L — SIGNIFICANT CHANGE UP (ref 3.5–5.3)
POTASSIUM SERPL-MCNC: 5.5 MMOL/L — HIGH (ref 3.5–5.3)
POTASSIUM SERPL-SCNC: 4.2 MMOL/L — SIGNIFICANT CHANGE UP (ref 3.5–5.3)
POTASSIUM SERPL-SCNC: 4.7 MMOL/L — SIGNIFICANT CHANGE UP (ref 3.5–5.3)
POTASSIUM SERPL-SCNC: 5.5 MMOL/L — HIGH (ref 3.5–5.3)
PROT SERPL-MCNC: 8.2 G/DL — SIGNIFICANT CHANGE UP (ref 6–8.3)
RBC # BLD: 3.19 M/UL — LOW (ref 3.8–5.2)
RBC # FLD: 16.4 % — HIGH (ref 10.3–14.5)
SODIUM SERPL-SCNC: 139 MMOL/L — SIGNIFICANT CHANGE UP (ref 135–145)
SODIUM SERPL-SCNC: 140 MMOL/L — SIGNIFICANT CHANGE UP (ref 135–145)
SODIUM SERPL-SCNC: 140 MMOL/L — SIGNIFICANT CHANGE UP (ref 135–145)
WBC # BLD: 11.34 K/UL — HIGH (ref 3.8–10.5)
WBC # FLD AUTO: 11.34 K/UL — HIGH (ref 3.8–10.5)

## 2019-04-30 PROCEDURE — 99232 SBSQ HOSP IP/OBS MODERATE 35: CPT | Mod: GC

## 2019-04-30 PROCEDURE — 99233 SBSQ HOSP IP/OBS HIGH 50: CPT

## 2019-04-30 RX ORDER — POLYETHYLENE GLYCOL 3350 17 G/17G
17 POWDER, FOR SOLUTION ORAL
Qty: 0 | Refills: 0 | Status: DISCONTINUED | OUTPATIENT
Start: 2019-04-30 | End: 2019-05-16

## 2019-04-30 RX ORDER — HYDRALAZINE HCL 50 MG
20 TABLET ORAL EVERY 8 HOURS
Qty: 0 | Refills: 0 | Status: DISCONTINUED | OUTPATIENT
Start: 2019-04-30 | End: 2019-05-02

## 2019-04-30 RX ADMIN — TICAGRELOR 90 MILLIGRAM(S): 90 TABLET ORAL at 06:10

## 2019-04-30 RX ADMIN — INSULIN GLARGINE 65 UNIT(S): 100 INJECTION, SOLUTION SUBCUTANEOUS at 22:34

## 2019-04-30 RX ADMIN — MONTELUKAST 10 MILLIGRAM(S): 4 TABLET, CHEWABLE ORAL at 12:29

## 2019-04-30 RX ADMIN — Medication 20 MILLIGRAM(S): at 22:34

## 2019-04-30 RX ADMIN — PANTOPRAZOLE SODIUM 40 MILLIGRAM(S): 20 TABLET, DELAYED RELEASE ORAL at 06:11

## 2019-04-30 RX ADMIN — Medication 3 MILLILITER(S): at 16:00

## 2019-04-30 RX ADMIN — Medication 3 MILLILITER(S): at 09:28

## 2019-04-30 RX ADMIN — Medication 20 MILLIGRAM(S): at 14:38

## 2019-04-30 RX ADMIN — ATORVASTATIN CALCIUM 40 MILLIGRAM(S): 80 TABLET, FILM COATED ORAL at 22:34

## 2019-04-30 RX ADMIN — BUMETANIDE 2 MILLIGRAM(S): 0.25 INJECTION INTRAMUSCULAR; INTRAVENOUS at 18:07

## 2019-04-30 RX ADMIN — Medication 1 SPRAY(S): at 22:27

## 2019-04-30 RX ADMIN — Medication 1 SPRAY(S): at 06:11

## 2019-04-30 RX ADMIN — Medication 1 SPRAY(S): at 14:38

## 2019-04-30 RX ADMIN — Medication 3 MILLILITER(S): at 22:42

## 2019-04-30 RX ADMIN — HEPARIN SODIUM 5000 UNIT(S): 5000 INJECTION INTRAVENOUS; SUBCUTANEOUS at 22:34

## 2019-04-30 RX ADMIN — BUDESONIDE AND FORMOTEROL FUMARATE DIHYDRATE 2 PUFF(S): 160; 4.5 AEROSOL RESPIRATORY (INHALATION) at 09:29

## 2019-04-30 RX ADMIN — CHLORHEXIDINE GLUCONATE 1 APPLICATION(S): 213 SOLUTION TOPICAL at 11:26

## 2019-04-30 RX ADMIN — Medication 30 UNIT(S): at 16:56

## 2019-04-30 RX ADMIN — TICAGRELOR 90 MILLIGRAM(S): 90 TABLET ORAL at 18:08

## 2019-04-30 RX ADMIN — HEPARIN SODIUM 5000 UNIT(S): 5000 INJECTION INTRAVENOUS; SUBCUTANEOUS at 14:38

## 2019-04-30 RX ADMIN — Medication 3 MILLILITER(S): at 03:41

## 2019-04-30 RX ADMIN — HEPARIN SODIUM 5000 UNIT(S): 5000 INJECTION INTRAVENOUS; SUBCUTANEOUS at 06:10

## 2019-04-30 RX ADMIN — Medication 81 MILLIGRAM(S): at 12:29

## 2019-04-30 RX ADMIN — BUDESONIDE AND FORMOTEROL FUMARATE DIHYDRATE 2 PUFF(S): 160; 4.5 AEROSOL RESPIRATORY (INHALATION) at 22:48

## 2019-04-30 RX ADMIN — Medication 30 UNIT(S): at 11:28

## 2019-04-30 RX ADMIN — SENNA PLUS 2 TABLET(S): 8.6 TABLET ORAL at 22:34

## 2019-04-30 RX ADMIN — Medication 50 MICROGRAM(S): at 06:10

## 2019-04-30 RX ADMIN — Medication 1: at 11:27

## 2019-04-30 RX ADMIN — Medication 100 MILLIGRAM(S): at 06:09

## 2019-04-30 RX ADMIN — BUMETANIDE 2 MILLIGRAM(S): 0.25 INJECTION INTRAMUSCULAR; INTRAVENOUS at 06:09

## 2019-04-30 RX ADMIN — GABAPENTIN 100 MILLIGRAM(S): 400 CAPSULE ORAL at 06:09

## 2019-04-30 RX ADMIN — Medication 20 MILLIGRAM(S): at 12:29

## 2019-04-30 RX ADMIN — Medication 10 MILLIGRAM(S): at 06:10

## 2019-04-30 RX ADMIN — GABAPENTIN 100 MILLIGRAM(S): 400 CAPSULE ORAL at 18:07

## 2019-04-30 NOTE — PROGRESS NOTE ADULT - SUBJECTIVE AND OBJECTIVE BOX
[facility] [place] [facility]   MD RAHEEL SHAH MD ANGELA WONG, PA    TEL:  OFFICE: [phone]  DR SANTOYO CELL: [phone]  DR. NGUYEN CELL: [phone]  UMM KENDALL CELL: [phone]        Patient is a 73y old  Female who presents with a chief complaint of sob (30 Apr 2019 12:06)      Patient seen and examined at bedside. still c/o sob    VITALS:  T(F): 98.2 (04-30-19 @ 08:00), Max: 98.4 (04-29-19 @ 16:00)  HR: 96 (04-30-19 @ 12:00)  BP: 103/63 (04-30-19 @ 12:00)  RR: 19 (04-30-19 @ 12:00)  SpO2: 100% (04-30-19 @ 12:00)  Wt(kg): --    04-29 @ 07:01  -  04-30 @ 07:00  --------------------------------------------------------  IN: 770 mL / OUT: 1935 mL / NET: -1165 mL    04-30 @ 07:01  -  04-30 @ 14:35  --------------------------------------------------------  IN: 0 mL / OUT: 450 mL / NET: -450 mL          PHYSICAL EXAM:  Constitutional: NAD  Neck: No JVD  Respiratory: CTAB, no wheezes, rales or rhonchi  Cardiovascular: S1, S2, RRR  Gastrointestinal: BS+, soft, NT/ND  Extremities: No peripheral edema    Hospital Medications:   MEDICATIONS  (STANDING):  ALBUTerol/ipratropium for Nebulization 3 milliLiter(s) Nebulizer every 6 hours  aspirin enteric coated 81 milliGRAM(s) Oral daily  atorvastatin 40 milliGRAM(s) Oral at bedtime  buDESOnide 160 MICROgram(s)/formoterol 4.5 MICROgram(s) Inhaler 2 Puff(s) Inhalation two times a day  buMETAnide Injectable 2 milliGRAM(s) IV Push every 12 hours  chlorhexidine 4% Liquid 1 Application(s) Topical <User Schedule>  dextrose 5%. 1000 milliLiter(s) (50 mL/Hr) IV Continuous <Continuous>  dextrose 50% Injectable 12.5 Gram(s) IV Push once  dextrose 50% Injectable 25 Gram(s) IV Push once  dextrose 50% Injectable 25 Gram(s) IV Push once  docusate sodium 100 milliGRAM(s) Oral two times a day  gabapentin 100 milliGRAM(s) Oral two times a day  heparin  Injectable 5000 Unit(s) SubCutaneous every 8 hours  hydrALAZINE 20 milliGRAM(s) Oral every 8 hours  influenza   Vaccine 0.5 milliLiter(s) IntraMuscular once  insulin glargine Injectable (LANTUS) 65 Unit(s) SubCutaneous at bedtime  insulin lispro (HumaLOG) corrective regimen sliding scale   SubCutaneous three times a day before meals  insulin lispro (HumaLOG) corrective regimen sliding scale   SubCutaneous at bedtime  insulin lispro Injectable (HumaLOG) 30 Unit(s) SubCutaneous three times a day before meals  levoFLOXacin  Tablet 500 milliGRAM(s) Oral every 48 hours  levothyroxine 50 MICROGram(s) Oral daily  montelukast 10 milliGRAM(s) Oral daily  pantoprazole    Tablet 40 milliGRAM(s) Oral before breakfast  polyethylene glycol 3350 17 Gram(s) Oral two times a day  predniSONE   Tablet 20 milliGRAM(s) Oral daily  senna 2 Tablet(s) Oral at bedtime  sodium chloride 0.65% Nasal 1 Spray(s) Both Nostrils three times a day  ticagrelor 90 milliGRAM(s) Oral two times a day      LABS:  04-30    139  |  101  |  60<H>  ----------------------------<  174<H>  4.2   |  21<L>  |  2.08<H>    Ca    9.6      30 Apr 2019 05:47  Phos  4.7     04-30  Mg     2.6     04-30    TPro  8.2  /  Alb  3.9  /  TBili  0.4  /  DBili      /  AST  38<H>  /  ALT  14  /  AlkPhos  61  04-29    Creatinine Trend: 2.08 <--, 2.06 <--, 2.09 <--, 2.03 <--, 2.02 <--, 1.98 <--, 2.06 <--, 1.97 <--, 2.06 <--, 1.92 <--, 1.89 <--, 1.91 <--, 2.21 <--    Phosphorus Level, Serum: 4.7 mg/dL (04-30 @ 05:47)  Phosphorus Level, Serum: 4.9 mg/dL (04-29 @ 23:10)  Albumin, Serum: 3.9 g/dL (04-29 @ 23:10)  Phosphorus Level, Serum: 5.6 mg/dL (04-29 @ 18:00)                              9.1    11.34 )-----------( 307      ( 30 Apr 2019 05:47 )             29.8     Urine Studies:  Urinalysis - [04-25-19 @ 09:30]      Color LIGHT YELLOW / Appearance CLEAR / SG 1.011 / pH 6.0      Gluc 70 / Ketone NEGATIVE  / Bili NEGATIVE / Urobili NORMAL       Blood NEGATIVE / Protein NEGATIVE / Leuk Est NEGATIVE / Nitrite NEGATIVE      RBC  / WBC  / Hyaline  / Gran  / Sq Epi  / Non Sq Epi  / Bacteria       .4 (Ca --)      [04-25-19 @ 05:45]   --  PTH 43.55 (Ca --)      [09-10-18 @ 07:15]   --  PTH 36.60 (Ca --)      [09-08-18 @ 03:15]   --  Vitamin D (25OH) 15.8      [09-08-18 @ 03:15]  HbA1c 7.3      [04-25-19 @ 05:45]  TSH 1.59      [04-25-19 @ 05:45]  Lipid: chol 127, , HDL 22, LDL 67      [04-24-19 @ 12:21]    HCV 0.06, Nonreactive Hepatitis C AB  S/CO Ratio                        Interpretation  < 1.00                                   Non-Reactive  1.00 - 4.99                         Weakly-Reactive  > 5.00                                Reactive  Non-Reactive: A person with a non-reactive HCV antibody  result is considered uninfected.  No further action is  needed unless recent infection is suspected.  In these  cases, consider repeat testing later to detect  seroconversion..  Weakly-Reactive: HCV antibody test is abnormal, HCV RNA  Qualitative test will follow.  Reactive: HCV antibody test is abnormal, HCV RNA  Qualitative test will follow.  Note: HCV antibody testing is performed on the [employer]   system.      [04-25-19 @ 05:45]      RADIOLOGY & ADDITIONAL STUDIES:

## 2019-04-30 NOTE — PROGRESS NOTE ADULT - SUBJECTIVE AND OBJECTIVE BOX
Patient is a 73y old  Female who presents with a chief complaint of sob (30 Apr 2019 10:35)      Any change in ROS: DW Daughter as wellas cards leora"Dr Gregg:    she is looking and doing better: awaiting anjali      MEDICATIONS  (STANDING):  ALBUTerol/ipratropium for Nebulization 3 milliLiter(s) Nebulizer every 6 hours  aspirin enteric coated 81 milliGRAM(s) Oral daily  atorvastatin 40 milliGRAM(s) Oral at bedtime  buDESOnide 160 MICROgram(s)/formoterol 4.5 MICROgram(s) Inhaler 2 Puff(s) Inhalation two times a day  buMETAnide Injectable 2 milliGRAM(s) IV Push every 12 hours  chlorhexidine 4% Liquid 1 Application(s) Topical <User Schedule>  dextrose 5%. 1000 milliLiter(s) (50 mL/Hr) IV Continuous <Continuous>  dextrose 50% Injectable 12.5 Gram(s) IV Push once  dextrose 50% Injectable 25 Gram(s) IV Push once  dextrose 50% Injectable 25 Gram(s) IV Push once  docusate sodium 100 milliGRAM(s) Oral two times a day  gabapentin 100 milliGRAM(s) Oral two times a day  heparin  Injectable 5000 Unit(s) SubCutaneous every 8 hours  hydrALAZINE 20 milliGRAM(s) Oral every 8 hours  influenza   Vaccine 0.5 milliLiter(s) IntraMuscular once  insulin glargine Injectable (LANTUS) 65 Unit(s) SubCutaneous at bedtime  insulin lispro (HumaLOG) corrective regimen sliding scale   SubCutaneous three times a day before meals  insulin lispro (HumaLOG) corrective regimen sliding scale   SubCutaneous at bedtime  insulin lispro Injectable (HumaLOG) 30 Unit(s) SubCutaneous three times a day before meals  levoFLOXacin  Tablet 500 milliGRAM(s) Oral every 48 hours  levothyroxine 50 MICROGram(s) Oral daily  montelukast 10 milliGRAM(s) Oral daily  pantoprazole    Tablet 40 milliGRAM(s) Oral before breakfast  polyethylene glycol 3350 17 Gram(s) Oral two times a day  predniSONE   Tablet 20 milliGRAM(s) Oral daily  senna 2 Tablet(s) Oral at bedtime  sodium chloride 0.65% Nasal 1 Spray(s) Both Nostrils three times a day  ticagrelor 90 milliGRAM(s) Oral two times a day    MEDICATIONS  (PRN):  acetaminophen   Tablet .. 650 milliGRAM(s) Oral every 6 hours PRN Mild Pain (1 - 3), Moderate Pain (4 - 6), Severe Pain (7 - 10)  ALBUTerol/ipratropium for Nebulization. 3 milliLiter(s) Nebulizer once PRN Shortness of Breath  dextrose 40% Gel 15 Gram(s) Oral once PRN Blood Glucose LESS THAN 70 milliGRAM(s)/deciliter  diphenhydrAMINE 25 milliGRAM(s) Oral every 4 hours PRN Rash and/or Itching  glucagon  Injectable 1 milliGRAM(s) IntraMuscular once PRN Glucose LESS THAN 70 milligrams/deciliter  ondansetron Injectable 4 milliGRAM(s) IV Push once PRN Nausea and/or Vomiting    Vital Signs Last 24 Hrs  T(C): 36.8 (30 Apr 2019 08:00), Max: 36.9 (29 Apr 2019 16:00)  T(F): 98.2 (30 Apr 2019 08:00), Max: 98.4 (29 Apr 2019 16:00)  HR: 95 (30 Apr 2019 10:00) (85 - 101)  BP: 111/58 (30 Apr 2019 10:00) (95/71 - 123/52)  BP(mean): 75 (30 Apr 2019 10:00) (67 - 84)  RR: 18 (30 Apr 2019 10:00) (16 - 28)  SpO2: 100% (30 Apr 2019 10:00) (94% - 100%)    I&O's Summary    29 Apr 2019 07:01  -  30 Apr 2019 07:00  --------------------------------------------------------  IN: 770 mL / OUT: 1935 mL / NET: -1165 mL    30 Apr 2019 07:01  -  30 Apr 2019 12:06  --------------------------------------------------------  IN: 0 mL / OUT: 450 mL / NET: -450 mL          Physical Exam:   GENERAL: NAD, well-groomed, well-developed  HEENT: MOHSEN/   Atraumatic, Normocephalic  ENMT: No tonsillar erythema, exudates, or enlargement; Moist mucous membranes, Good dentition, No lesions  NECK: Supple, No JVD, Normal thyroid  CHEST/LUNG: Poor air entry   CVS: Regular rate and rhythm; No murmurs, rubs, or gallops  GI: : Soft, Nontender, Nondistended; Arturo  EXTREMITIES:  2+ Peripheral Pulses, No clubbing, cyanosis, or edema  LYMPH: No lymphadenopathy noted  SKIN: No rashes or lesions  ENDOCRINOLOGY: No Thyromegaly  PSYCH: Appropriate    Labs:  22                            9.1    11.34 )-----------( 307      ( 30 Apr 2019 05:47 )             29.8                         9.5    12.59 )-----------( 293      ( 29 Apr 2019 04:30 )             32.1                         8.8    12.13 )-----------( 282      ( 28 Apr 2019 05:00 )             28.8                         9.1    11.57 )-----------( 301      ( 27 Apr 2019 03:12 )             29.7                         10.1   12.27 )-----------( 282      ( 26 Apr 2019 16:20 )             33.4                         8.4    10.62 )-----------( 246      ( 26 Apr 2019 12:48 )             29.0     04-30    139  |  101  |  60<H>  ----------------------------<  174<H>  4.2   |  21<L>  |  2.08<H>  04-29    140  |  100  |  60<H>  ----------------------------<  158<H>  5.5<H>   |  21<L>  |  2.06<H>  04-29    136  |  99  |  60<H>  ----------------------------<  203<H>  5.6<H>   |  22  |  2.09<H>  04-29    139  |  104  |  55<H>  ----------------------------<  113<H>  4.6   |  19<L>  |  2.03<H>  04-28    145  |  107  |  57<H>  ----------------------------<  150<H>  4.3   |  19<L>  |  2.02<H>  04-28    139  |  105  |  56<H>  ----------------------------<  224<H>  4.4   |  18<L>  |  1.98<H>  04-27    141  |  103  |  57<H>  ----------------------------<  167<H>  3.8   |  20<L>  |  2.06<H>  04-27    140  |  104  |  56<H>  ----------------------------<  181<H>  3.6   |  19<L>  |  1.97<H>  04-26    139  |  102  |  60<H>  ----------------------------<  155<H>  3.9   |  19<L>  |  2.06<H>  04-26    136  |  99  |  55<H>  ----------------------------<  266<H>  3.9   |  19<L>  |  1.92<H>    Ca    9.6      30 Apr 2019 05:47  Ca    9.8      29 Apr 2019 23:10  Ca    9.7      29 Apr 2019 18:00  Ca    9.9      29 Apr 2019 04:30  Ca    10.7<H>      28 Apr 2019 17:35  Phos  4.7     04-30  Phos  4.9     04-29  Phos  5.6     04-29  Phos  4.7     04-29  Phos  3.7     04-28  Mg     2.6     04-30  Mg     2.5     04-29  Mg     2.6     04-29  Mg     2.5     04-29  Mg     2.5     04-28    TPro  8.2  /  Alb  3.9  /  TBili  0.4  /  DBili  x   /  AST  38<H>  /  ALT  14  /  AlkPhos  61  04-29  TPro  7.5  /  Alb  3.6  /  TBili  0.4  /  DBili  x   /  AST  17  /  ALT  12  /  AlkPhos  60  04-29  TPro  7.6  /  Alb  3.8  /  TBili  0.3  /  DBili  x   /  AST  12  /  ALT  7   /  AlkPhos  60  04-28  TPro  7.9  /  Alb  4.1  /  TBili  0.3  /  DBili  x   /  AST  14  /  ALT  8   /  AlkPhos  63  04-27  TPro  7.4  /  Alb  3.7  /  TBili  0.3  /  DBili  x   /  AST  14  /  ALT  9   /  AlkPhos  61  04-27  TPro  7.6  /  Alb  3.7  /  TBili  0.3  /  DBili  x   /  AST  17  /  ALT  11  /  AlkPhos  65  04-26    CAPILLARY BLOOD GLUCOSE      POCT Blood Glucose.: 195 mg/dL (30 Apr 2019 11:22)  POCT Blood Glucose.: 148 mg/dL (30 Apr 2019 07:49)  POCT Blood Glucose.: 175 mg/dL (29 Apr 2019 22:16)  POCT Blood Glucose.: 126 mg/dL (29 Apr 2019 17:01)  POCT Blood Glucose.: 131 mg/dL (29 Apr 2019 12:21)      LIVER FUNCTIONS - ( 29 Apr 2019 23:10 )  Alb: 3.9 g/dL / Pro: 8.2 g/dL / ALK PHOS: 61 u/L / ALT: 14 u/L / AST: 38 u/L / GGT: x               D-Dimer Assay, Quantitative: 385 ng/mL (04-24 @ 13:50)  Lactate, Blood: 2.0 mmol/L (04-27 @ 03:12)  Lactate, Blood: 1.5 mmol/L (04-26 @ 16:30)        RECENT CULTURES:  04-25 @ 10:34 BLOOD PERIPHERAL   < from: CT Chest No Cont (04.25.19 @ 20:27) >    TECHNIQUE: Unenhanced helical images were obtained of the chest. Coronal   and sagittalimages were reconstructed. Maximum intensity projection   images were generated.     COMPARISON: CT chest 1/31/2018    FINDINGS: The quality of the images are degraded by respiratory motion   and expiratory phase of the imaging.    AIRWAYS, LUNGS, PLEURA: Patent central airways. No bronchial wall   thickening or bronchiectasis.    Mosaic attenuation of the lungs.    No consolidation.    Trace layering bilateral pleural effusions.    MEDIASTINUM, LYMPH NODES: No lymphadenopathy.    HEART AND VASCULATURE: The heart is mildly enlarged without pericardial   effusion. Thoracic aorta and pulmonary artery normal in course and   caliber. CABG.    UPPER ABDOMEN: Within normal limits.    BONES AND SOFT TISSUES: No aggressive osseous lesion. Degenerative   changes of the spine. Fatty atrophy of the paraspinous muscles.    LOWER NECK: Within normal limits.    IMPRESSION:     Mosaic attenuation of the lungs. This finding is indeterminant and may be   secondary to air trapping, pneumonitis or atypical infection.      < from: Transthoracic Echocardiogram (04.26.19 @ 14:31) >  Pericardium/PleuraNormal pericardium with no pericardial  effusion.  Hemodynamic: Estimated right ventricular systolic pressure  equals 70 mm Hg, assuming right atrial pressure equals 10  mm Hg, consistent with severe pulmonary hypertension.  ------------------------------------------------------------------------  CONCLUSIONS:  Limited study to reevaluate RV/PASP  1. Tethered mitral valve leaflets. Severe mitral  regurgitation.  2. Severe left ventricular systolic dysfunction.  3. Normal right ventricular size with decreased right  ventricular systolic function.  4. Normal tricuspid valve.  Moderate-severe tricuspid  regurgitation.  5. Estimated pulmonary artery systolic pressure equals 70  mm Hg, assuming right atrial pressure equals 10  mm Hg,  consistent with severe pulmonary hypertension.  ------------------------------------------------------------------------  Confirmed on  4/26/2019 - 16:39:57 by ELIDIA Viveros  ------------------------------------------------------------------------    < end of copied text >              DIMITRIOS FRAZIER M.D., ATTENDING RADIOLOGIST  This document has been electronically signed. Apr 26 2019  9:02AM              < end of copied text >                 NO ORGANISMS ISOLATED        RESPIRATORY CULTURES:          Studies  Chest X-RAY  CT SCAN Chest   Venous Dopplers: LE:   CT Abdomen  Others

## 2019-04-30 NOTE — PROGRESS NOTE ADULT - ATTENDING COMMENTS
AS ABOVE;  : pt was started empirically on levofloxacin for pneumonia . doubt it: she does have mild leukocytosis but afebrile: CT scan suggestive of air trappin/30: today she was started on steroids for gout attack: Her breathing seesm to eb better: AAWITING anjali:

## 2019-04-30 NOTE — PROGRESS NOTE ADULT - ASSESSMENT
74 YO F w/o h/o CAD, s/p CABG Systolic CHF, CKD 3b, who p/w sob and pino.    - Acute on Chronic Systolic CHF Exacerbation:      - c/w diuresis as tolerated per CCU      - c/w cardiac meds       - cardio/ CCU care and mgmt appreciated       - tele monitoring     - post NSTEMI:      - Continue Current medications and monitor.       - adjust management per cards, otherwise c/w cardiac meds      - optimize renal function and fluid status, eventual cardiac cath      - tele    -  shoulder and foot pain     - appears to be arthritis related pain and likely in part due to acute gout given elevated esr and uric acid     - would recomend tylenol only for now as pt's creatinine elevated ( limit NSAIDS)     - shoulder Xray pending      - pain mgmt      - PT as able       - MERVIN on CKD      - trend renal function closely on diuretics       - renal f/u and optimization / clearance if cath planned       - avoid nephrotoxic rx     - DM II 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

## 2019-04-30 NOTE — PROGRESS NOTE ADULT - SUBJECTIVE AND OBJECTIVE BOX
Interval History:  reports pain in feet b/l  denies orthopnea but has some SOB    Medications:  acetaminophen   Tablet .. 650 milliGRAM(s) Oral every 6 hours PRN  ALBUTerol/ipratropium for Nebulization 3 milliLiter(s) Nebulizer every 6 hours  ALBUTerol/ipratropium for Nebulization. 3 milliLiter(s) Nebulizer once PRN  aspirin enteric coated 81 milliGRAM(s) Oral daily  atorvastatin 40 milliGRAM(s) Oral at bedtime  buDESOnide 160 MICROgram(s)/formoterol 4.5 MICROgram(s) Inhaler 2 Puff(s) Inhalation two times a day  buMETAnide Injectable 2 milliGRAM(s) IV Push every 12 hours  chlorhexidine 4% Liquid 1 Application(s) Topical <User Schedule>  dextrose 40% Gel 15 Gram(s) Oral once PRN  dextrose 5%. 1000 milliLiter(s) IV Continuous <Continuous>  dextrose 50% Injectable 12.5 Gram(s) IV Push once  dextrose 50% Injectable 25 Gram(s) IV Push once  dextrose 50% Injectable 25 Gram(s) IV Push once  diphenhydrAMINE 25 milliGRAM(s) Oral every 4 hours PRN  docusate sodium 100 milliGRAM(s) Oral two times a day  gabapentin 100 milliGRAM(s) Oral two times a day  glucagon  Injectable 1 milliGRAM(s) IntraMuscular once PRN  heparin  Injectable 5000 Unit(s) SubCutaneous every 8 hours  hydrALAZINE 20 milliGRAM(s) Oral every 8 hours  influenza   Vaccine 0.5 milliLiter(s) IntraMuscular once  insulin glargine Injectable (LANTUS) 65 Unit(s) SubCutaneous at bedtime  insulin lispro (HumaLOG) corrective regimen sliding scale   SubCutaneous three times a day before meals  insulin lispro (HumaLOG) corrective regimen sliding scale   SubCutaneous at bedtime  insulin lispro Injectable (HumaLOG) 30 Unit(s) SubCutaneous three times a day before meals  levoFLOXacin  Tablet 500 milliGRAM(s) Oral every 48 hours  levothyroxine 50 MICROGram(s) Oral daily  montelukast 10 milliGRAM(s) Oral daily  ondansetron Injectable 4 milliGRAM(s) IV Push once PRN  pantoprazole    Tablet 40 milliGRAM(s) Oral before breakfast  polyethylene glycol 3350 17 Gram(s) Oral two times a day  predniSONE   Tablet 20 milliGRAM(s) Oral daily  senna 2 Tablet(s) Oral at bedtime  sodium chloride 0.65% Nasal 1 Spray(s) Both Nostrils three times a day  ticagrelor 90 milliGRAM(s) Oral two times a day      Vitals:  T(C): 36.8 (19 @ 08:00), Max: 36.8 (19 @ 19:53)  HR: 90 (19 @ 16:00) (85 - 102)  BP: 110/62 (19 @ 14:00) (95/71 - 123/60)  BP(mean): 77 (19 @ 14:00) (67 - 84)  RR: 22 (19 @ 14:00) (15 - 28)  SpO2: 100% (19 @ 14:00) (94% - 100%)    Daily     Daily Weight in k.1 (2019 06:00)    I&O's Summary    2019 07:  -  2019 07:00  --------------------------------------------------------  IN: 770 mL / OUT: 1935 mL / NET: -1165 mL    2019 07:01  -  2019 16:04  --------------------------------------------------------  IN: 0 mL / OUT: 800 mL / NET: -800 mL    Physical Exam:  Appearance: No Acute Distress  HEENT: PERRL  Neck: JVP approx 12 cm with HJR  Cardiovascular: Normal S1 S2, No murmurs/rubs/gallops  Respiratory: Clear to auscultation bilaterally  Gastrointestinal: Soft, Non-tender	  Skin: No cyanosis	  Neurologic: Non-focal  Extremities: No LE edema; TTP along dorsum b/l   Psychiatry: A & O x 3, Mood & affect appropriate    Labs:                        9.1    11.34 )-----------( 307      ( 2019 05:47 )             29.8         139  |  101  |  60<H>  ----------------------------<  174<H>  4.2   |  21<L>  |  2.08<H>    Ca    9.6      2019 05:47  Phos  4.7       Mg     2.6         TPro  8.2  /  Alb  3.9  /  TBili  0.4  /  DBili  x   /  AST  38<H>  /  ALT  14  /  AlkPhos  61

## 2019-04-30 NOTE — PROGRESS NOTE ADULT - SUBJECTIVE AND OBJECTIVE BOX
Patient seen and examined at bedside  No acute events noted overnight  Case discussed with medical team    HPI:  73YOF with hx of CAD s/p CABG, hypothyroidism, CKD, CHF, HTN, DM p/w worsening ZEE and sob. Patient usually can walk up a few steps before feeling sob, currently feeling sob after walking from living room to kitchen.  She is using 3 pillows to sleep. No cough, fevers. (+) CP, SS and constant, She experienced more ZEE for last two days.  She has no edema in lower extremety.  Last admission to [facility] was 11/18. (24 Apr 2019 10:21)      PAST MEDICAL & SURGICAL HISTORY:  GERD (gastroesophageal reflux disease)  CAD (coronary artery disease): s/p CABG  Hypothyroidism  Hyperlipidemia  Diabetes mellitus, type 2  S/P CABG (coronary artery bypass graft)      azithromycin (Hives; Pruritus)       MEDICATIONS  (STANDING):  ALBUTerol/ipratropium for Nebulization 3 milliLiter(s) Nebulizer every 6 hours  aspirin enteric coated 81 milliGRAM(s) Oral daily  atorvastatin 40 milliGRAM(s) Oral at bedtime  buDESOnide 160 MICROgram(s)/formoterol 4.5 MICROgram(s) Inhaler 2 Puff(s) Inhalation two times a day  buMETAnide Injectable 2 milliGRAM(s) IV Push every 12 hours  chlorhexidine 4% Liquid 1 Application(s) Topical <User Schedule>  dextrose 5%. 1000 milliLiter(s) (50 mL/Hr) IV Continuous <Continuous>  dextrose 50% Injectable 12.5 Gram(s) IV Push once  dextrose 50% Injectable 25 Gram(s) IV Push once  dextrose 50% Injectable 25 Gram(s) IV Push once  docusate sodium 100 milliGRAM(s) Oral two times a day  gabapentin 100 milliGRAM(s) Oral two times a day  heparin  Injectable 5000 Unit(s) SubCutaneous every 8 hours  hydrALAZINE 10 milliGRAM(s) Oral every 8 hours  influenza   Vaccine 0.5 milliLiter(s) IntraMuscular once  insulin glargine Injectable (LANTUS) 65 Unit(s) SubCutaneous at bedtime  insulin lispro (HumaLOG) corrective regimen sliding scale   SubCutaneous three times a day before meals  insulin lispro (HumaLOG) corrective regimen sliding scale   SubCutaneous at bedtime  insulin lispro Injectable (HumaLOG) 30 Unit(s) SubCutaneous three times a day before meals  levoFLOXacin  Tablet 500 milliGRAM(s) Oral every 48 hours  levothyroxine 50 MICROGram(s) Oral daily  montelukast 10 milliGRAM(s) Oral daily  pantoprazole    Tablet 40 milliGRAM(s) Oral before breakfast  senna 2 Tablet(s) Oral at bedtime  sodium chloride 0.65% Nasal 1 Spray(s) Both Nostrils three times a day  ticagrelor 90 milliGRAM(s) Oral two times a day    MEDICATIONS  (PRN):  acetaminophen   Tablet .. 650 milliGRAM(s) Oral every 6 hours PRN Mild Pain (1 - 3), Moderate Pain (4 - 6), Severe Pain (7 - 10)  ALBUTerol/ipratropium for Nebulization. 3 milliLiter(s) Nebulizer once PRN Shortness of Breath  dextrose 40% Gel 15 Gram(s) Oral once PRN Blood Glucose LESS THAN 70 milliGRAM(s)/deciliter  diphenhydrAMINE 25 milliGRAM(s) Oral every 4 hours PRN Rash and/or Itching  glucagon  Injectable 1 milliGRAM(s) IntraMuscular once PRN Glucose LESS THAN 70 milligrams/deciliter  ondansetron Injectable 4 milliGRAM(s) IV Push once PRN Nausea and/or Vomiting      REVIEW OF SYSTEMS:  CONSTITUTIONAL: (+) malaise.   EYES: No acute change in vision   ENT:  No tinnitus  NECK: No stiffness  RESPIRATORY: zee. No hemoptysis  CARDIOVASCULAR: decreased exercise tolerance. No active chest pain, palpitations, syncope  GASTROINTESTINAL: No hematemesis, diarrhea, melena, or hematochezia.  GENITOURINARY: No hematuria  NEUROLOGICAL: No headaches  LYMPH Nodes: No enlarged glands  ENDOCRINE: No heat or cold intolerance	    T(C): 36.8 (04-30-19 @ 08:00), Max: 36.9 (04-29-19 @ 16:00)  HR: 95 (04-30-19 @ 07:00) (85 - 101)  BP: 109/58 (04-30-19 @ 07:00) (95/71 - 123/52)  RR: 25 (04-30-19 @ 07:00) (16 - 28)  SpO2: 99% (04-30-19 @ 07:00) (94% - 100%)    PHYSICAL EXAMINATION:   Constitutional: ill appearing  HEENT: NC, AT  Neck:  Supple  Respiratory:  Adequate airflow b/l. Not using accessory muscles of respiration.  Cardiovascular: systolic murmur, S1 & S2 intact, no R/G, 2+ radial pulses b/l  Gastrointestinal: Soft, NT, ND, normoactive b.s., no organomegaly/RT/rigidity  Extremities: WWP  Neurological:  Alert and awake.  No acute focal motor deficits. Crude sensation intact.     Labs and imaging reviewed    LABS:                        9.1    11.34 )-----------( 307      ( 30 Apr 2019 05:47 )             29.8     04-30    139  |  101  |  60<H>  ----------------------------<  174<H>  4.2   |  21<L>  |  2.08<H>    Ca    9.6      30 Apr 2019 05:47  Phos  4.7     04-30  Mg     2.6     04-30    TPro  8.2  /  Alb  3.9  /  TBili  0.4  /  DBili  x   /  AST  38<H>  /  ALT  14  /  AlkPhos  61  04-29            CAPILLARY BLOOD GLUCOSE      POCT Blood Glucose.: 148 mg/dL (30 Apr 2019 07:49)  POCT Blood Glucose.: 175 mg/dL (29 Apr 2019 22:16)  POCT Blood Glucose.: 126 mg/dL (29 Apr 2019 17:01)  POCT Blood Glucose.: 131 mg/dL (29 Apr 2019 12:21)  POCT Blood Glucose.: 83 mg/dL (29 Apr 2019 12:02)  POCT Blood Glucose.: 47 mg/dL (29 Apr 2019 11:41)        LIVER FUNCTIONS - ( 29 Apr 2019 23:10 )  Alb: 3.9 g/dL / Pro: 8.2 g/dL / ALK PHOS: 61 u/L / ALT: 14 u/L / AST: 38 u/L / GGT: x               RADIOLOGY & ADDITIONAL STUDIES:

## 2019-04-30 NOTE — PROGRESS NOTE ADULT - PROBLEM SELECTOR PLAN 3
- patient with SOB, +cough, CT with ground glass opacities   - levofloxacin (renally dosed) for CAP (currently day 5). Will do 7d course as per ID

## 2019-04-30 NOTE — PROGRESS NOTE ADULT - ASSESSMENT
73YOF w/o h/o CAD, s/p CABG CHF adm to [facility] for Acute on Chronic systolic and diastolic HF

## 2019-04-30 NOTE — PROGRESS NOTE ADULT - ASSESSMENT
Briefly, 74 y/o female with h/o HTN, DM, CAD s/p CABG (w/ prior PCI to Ramus; LIMA-LAD patent), HFrEF (LVEF 25%, LVEDD 5.2 cm), likely mod-severe MR, severe pulm HTN comes in with cough and SOB. CT chest showed ground glass opacities, patient was started on IV zithromax, developed rash, now on Levaquin. She was admitted to telemetry was given a dose of lopressor, patient became hypotensive and went into respiratory distress, patient was then moved to CCU. HF was consulted to help manage this patient who is SOB and is hypotensive. Diuresed with improvement. Developed b/l feet pain (? gout). On exam, NAD, JVP approx 12 cm with mild HJR, abdom nontender, no pedal edema, WWP. Labs reviewed. EKG reviewed. TTE - EF 25%, LVEDD 5.2 cm, mod MR (? underestimated; likely ischemic). Overall stage C HF, NYHA class IV w/ possible pulmonary edema 2/2 ischemic MR  - continue bumex 2 mg q12  - increase hydral to 20 mg q8h (hold SBP <90)  - hold BB for now  - consider LV when more stable to assess MR severity  - would treat suspected gout with prednisone 20 mg daily x 3 days

## 2019-04-30 NOTE — PROGRESS NOTE ADULT - PROBLEM SELECTOR PLAN 9
DVT PPx: heparin 5000 TID   Diet: fluid restriction, renal/diabetic/DASH diet  Dispo: pending medical management of ADHF

## 2019-04-30 NOTE — PROGRESS NOTE ADULT - ATTENDING COMMENTS
LV deferred, will optimize respiratory status, appreciate HF recs, continues to diurese  Prednisone started for possible gout flare

## 2019-04-30 NOTE — PROGRESS NOTE ADULT - ASSESSMENT
73YOF with hx of CAD s/p CABG, hypothyroidism, CKD, CHF, HTN, DM p/w worsening ZEE and sob. Patient usually can walk up a few steps before feeling sob, currently feeling sob after walking from living room to kitchen.  She is using 3 pillows to sleep. No cough, fevers. (+) CP, SS and constant, She experienced more ZEE for last two days.  She has no edema in lower extremity.  Last admission to [facility] was 11/18. (24 Apr 2019 10:21)    ER vss.  Pt afebrile.  WBC 8.7 --> 10.7.  UA (-), RVP (-).  4/25 cxr with L hazy retrocardiac opacity.   Pt found to have NSTEMI and gross fluid overload, started on IV lasix.  She is pending [facility].        ID consult called for further abx managment and evaluation for pna.       Recommend:    - Pt with ZEE/SOB, (+) NSTEMI.  Cxr with L hazy opacity.  Pt w/o cough or fever to suggest pna on clinical basis.  CT chest shows mosaic attentuation/pneumonitis/atypical infection.       - Azithro d/c'd due to allergic reaction. Legionella Ag neg.  Pct is mildly elevated.      - Check repeat cxr following diuresis, evaluate for developing consolidations/pna.  Abx changed to levaquin by primary team.  Recommend checking serial EKGs and Qtc interval (last Qtc = 504) while pt on fluroquinolone.   Complete 7 day course on 5/2     - Pt transferred to CCU for continued managment of acute decompensated CHF.   TTE with severe LV dysfunction, severe MR and PH.  Pt planned for LV    Will follow,    Joselin Roman  [phone]

## 2019-04-30 NOTE — PROGRESS NOTE ADULT - PROBLEM SELECTOR PLAN 6
- patient with CKD III, Baseline Scr 1.5-1.8  - presented with Scr 2.21, currently 2.08  - continue to diurese as tolerated   - daily BMPs. Daily weights    - nephro following   - avoid nephrotoxins

## 2019-04-30 NOTE — PROGRESS NOTE ADULT - PROBLEM SELECTOR PLAN 2
4/27 chest pain is stable. on dual antiplatelet therapy. management per cardiology  4/28 per cardiology some point heart cath  4/29: per cardiology : she is on diuretics!  4/30: has severe MR for anjali in [facility]

## 2019-04-30 NOTE — PROGRESS NOTE ADULT - SUBJECTIVE AND OBJECTIVE BOX
Infectious Diseases progress note:    Subjective:  Appears comfortable.  Afebrile.  No cp/sob/cough.  Pt planned for LV.  WBC improved    ROS:  CONSTITUTIONAL:  No fever, chills, rigors  CARDIOVASCULAR:  No chest pain or palpitations  RESPIRATORY:   No SOB, cough, dyspnea on exertion.  No wheezing  GASTROINTESTINAL:  No abd pain, N/V, diarrhea/constipation  EXTREMITIES:  No swelling or joint pain  GENITOURINARY:  No burning on urination, increased frequency or urgency.  No flank pain  NEUROLOGIC:  No HA, visual disturbances  SKIN: No rashes    Allergies    azithromycin (Hives; Pruritus)    Intolerances        ANTIBIOTICS/RELEVANT:  antimicrobials  levoFLOXacin  Tablet 500 milliGRAM(s) Oral every 48 hours    immunologic:  influenza   Vaccine 0.5 milliLiter(s) IntraMuscular once    OTHER:  acetaminophen   Tablet .. 650 milliGRAM(s) Oral every 6 hours PRN  ALBUTerol/ipratropium for Nebulization 3 milliLiter(s) Nebulizer every 6 hours  ALBUTerol/ipratropium for Nebulization. 3 milliLiter(s) Nebulizer once PRN  aspirin enteric coated 81 milliGRAM(s) Oral daily  atorvastatin 40 milliGRAM(s) Oral at bedtime  buDESOnide 160 MICROgram(s)/formoterol 4.5 MICROgram(s) Inhaler 2 Puff(s) Inhalation two times a day  buMETAnide Injectable 2 milliGRAM(s) IV Push every 12 hours  chlorhexidine 4% Liquid 1 Application(s) Topical <User Schedule>  dextrose 40% Gel 15 Gram(s) Oral once PRN  dextrose 5%. 1000 milliLiter(s) IV Continuous <Continuous>  dextrose 50% Injectable 12.5 Gram(s) IV Push once  dextrose 50% Injectable 25 Gram(s) IV Push once  dextrose 50% Injectable 25 Gram(s) IV Push once  diphenhydrAMINE 25 milliGRAM(s) Oral every 4 hours PRN  docusate sodium 100 milliGRAM(s) Oral two times a day  gabapentin 100 milliGRAM(s) Oral two times a day  glucagon  Injectable 1 milliGRAM(s) IntraMuscular once PRN  heparin  Injectable 5000 Unit(s) SubCutaneous every 8 hours  hydrALAZINE 20 milliGRAM(s) Oral every 8 hours  insulin glargine Injectable (LANTUS) 65 Unit(s) SubCutaneous at bedtime  insulin lispro (HumaLOG) corrective regimen sliding scale   SubCutaneous three times a day before meals  insulin lispro (HumaLOG) corrective regimen sliding scale   SubCutaneous at bedtime  insulin lispro Injectable (HumaLOG) 30 Unit(s) SubCutaneous three times a day before meals  levothyroxine 50 MICROGram(s) Oral daily  montelukast 10 milliGRAM(s) Oral daily  ondansetron Injectable 4 milliGRAM(s) IV Push once PRN  pantoprazole    Tablet 40 milliGRAM(s) Oral before breakfast  polyethylene glycol 3350 17 Gram(s) Oral two times a day  predniSONE   Tablet 20 milliGRAM(s) Oral daily  senna 2 Tablet(s) Oral at bedtime  sodium chloride 0.65% Nasal 1 Spray(s) Both Nostrils three times a day  ticagrelor 90 milliGRAM(s) Oral two times a day      Objective:  Vital Signs Last 24 Hrs  T(C): 36.8 (30 Apr 2019 08:00), Max: 36.9 (29 Apr 2019 16:00)  T(F): 98.2 (30 Apr 2019 08:00), Max: 98.4 (29 Apr 2019 16:00)  HR: 94 (30 Apr 2019 14:00) (85 - 102)  BP: 110/62 (30 Apr 2019 14:00) (95/71 - 123/60)  BP(mean): 77 (30 Apr 2019 14:00) (67 - 84)  RR: 22 (30 Apr 2019 14:00) (15 - 28)  SpO2: 100% (30 Apr 2019 14:00) (94% - 100%)    PHYSICAL EXAM:  Constitutional:NAD  Eyes:MHOSEN, EOMI  Ear/Nose/Throat: no thrush, mucositis.  Moist mucous membranes	  Neck:no JVD, no lymphadenopathy, supple  Respiratory: CTA maribel  Cardiovascular: S1S2 RRR, no murmurs  Gastrointestinal:soft, nontender,  nondistended (+) BS  Extremities:no e/e/c  Skin:  no rashes, open wounds or ulcerations        LABS:                        9.1    11.34 )-----------( 307      ( 30 Apr 2019 05:47 )             29.8     04-30    139  |  101  |  60<H>  ----------------------------<  174<H>  4.2   |  21<L>  |  2.08<H>    Ca    9.6      30 Apr 2019 05:47  Phos  4.7     04-30  Mg     2.6     04-30    TPro  8.2  /  Alb  3.9  /  TBili  0.4  /  DBili  x   /  AST  38<H>  /  ALT  14  /  AlkPhos  61  04-29          Procalcitonin, Serum: 0.13 (04-26 @ 11:36)                    MICROBIOLOGY:    Culture - Blood (04.25.19 @ 10:34)    Culture - Blood:   NO ORGANISMS ISOLATED    Specimen Source: BLOOD VENOUS    Culture - Blood (04.25.19 @ 10:34)    Culture - Blood:   NO ORGANISMS ISOLATED    Specimen Source: BLOOD PERIPHERAL          RADIOLOGY & ADDITIONAL STUDIES:

## 2019-04-30 NOTE — PROGRESS NOTE ADULT - PROBLEM SELECTOR PLAN 1
- patient still slightly volume up. Currently on bumex 2mg IV BID   - On TTE, Severe LV dysfunction. Severe pulm HTN. mod-severe MR. Will do LV    - hold beta blockers in the setting of acute decompensated heart failure  - strict I/O  - No ACE/ARB given MERVIN   - increase hydralazine 10mg q8h to hydralazine 20mg q8h  - c/w Oscar  - LV tomorrow to assess valves

## 2019-04-30 NOTE — PROGRESS NOTE ADULT - PROBLEM SELECTOR PLAN 1
to me ct scan looks more like air trapping: SHE IS NOT WHEEZING: BUT DON'T HAVE HER pft : WOULD SUGGEST TO STARTS SYMBICORT AS WELL AS SINGULAIR Daily: doubt pneumonia  4/30: she seems to eb doing better: cont symbicort as wellas singulair:

## 2019-05-01 LAB
24R-OH-CALCIDIOL SERPL-MCNC: 25.9 NG/ML — LOW (ref 30–80)
ANION GAP SERPL CALC-SCNC: 18 MMO/L — HIGH (ref 7–14)
ANION GAP SERPL CALC-SCNC: 19 MMO/L — HIGH (ref 7–14)
BUN SERPL-MCNC: 73 MG/DL — HIGH (ref 7–23)
BUN SERPL-MCNC: 74 MG/DL — HIGH (ref 7–23)
CALCIUM SERPL-MCNC: 9.3 MG/DL — SIGNIFICANT CHANGE UP (ref 8.4–10.5)
CALCIUM SERPL-MCNC: 9.4 MG/DL — SIGNIFICANT CHANGE UP (ref 8.4–10.5)
CHLORIDE SERPL-SCNC: 100 MMOL/L — SIGNIFICANT CHANGE UP (ref 98–107)
CHLORIDE SERPL-SCNC: 99 MMOL/L — SIGNIFICANT CHANGE UP (ref 98–107)
CO2 SERPL-SCNC: 19 MMOL/L — LOW (ref 22–31)
CO2 SERPL-SCNC: 20 MMOL/L — LOW (ref 22–31)
CREAT SERPL-MCNC: 2.13 MG/DL — HIGH (ref 0.5–1.3)
CREAT SERPL-MCNC: 2.26 MG/DL — HIGH (ref 0.5–1.3)
GLUCOSE SERPL-MCNC: 236 MG/DL — HIGH (ref 70–99)
GLUCOSE SERPL-MCNC: 250 MG/DL — HIGH (ref 70–99)
HCT VFR BLD CALC: 29.3 % — LOW (ref 34.5–45)
HGB BLD-MCNC: 9 G/DL — LOW (ref 11.5–15.5)
MAGNESIUM SERPL-MCNC: 2.6 MG/DL — SIGNIFICANT CHANGE UP (ref 1.6–2.6)
MAGNESIUM SERPL-MCNC: 2.6 MG/DL — SIGNIFICANT CHANGE UP (ref 1.6–2.6)
MCHC RBC-ENTMCNC: 28.5 PG — SIGNIFICANT CHANGE UP (ref 27–34)
MCHC RBC-ENTMCNC: 30.7 % — LOW (ref 32–36)
MCV RBC AUTO: 92.7 FL — SIGNIFICANT CHANGE UP (ref 80–100)
NRBC # FLD: 0 K/UL — SIGNIFICANT CHANGE UP (ref 0–0)
PHOSPHATE SERPL-MCNC: 4.3 MG/DL — SIGNIFICANT CHANGE UP (ref 2.5–4.5)
PHOSPHATE SERPL-MCNC: 4.3 MG/DL — SIGNIFICANT CHANGE UP (ref 2.5–4.5)
PLATELET # BLD AUTO: 365 K/UL — SIGNIFICANT CHANGE UP (ref 150–400)
PMV BLD: 9.4 FL — SIGNIFICANT CHANGE UP (ref 7–13)
POTASSIUM SERPL-MCNC: 4.2 MMOL/L — SIGNIFICANT CHANGE UP (ref 3.5–5.3)
POTASSIUM SERPL-MCNC: 5.6 MMOL/L — HIGH (ref 3.5–5.3)
POTASSIUM SERPL-SCNC: 4.2 MMOL/L — SIGNIFICANT CHANGE UP (ref 3.5–5.3)
POTASSIUM SERPL-SCNC: 5.6 MMOL/L — HIGH (ref 3.5–5.3)
RBC # BLD: 3.16 M/UL — LOW (ref 3.8–5.2)
RBC # FLD: 16.4 % — HIGH (ref 10.3–14.5)
SODIUM SERPL-SCNC: 137 MMOL/L — SIGNIFICANT CHANGE UP (ref 135–145)
SODIUM SERPL-SCNC: 138 MMOL/L — SIGNIFICANT CHANGE UP (ref 135–145)
VIT D25+D1,25 OH+D1,25 PNL SERPL-MCNC: 41.8 PG/ML — SIGNIFICANT CHANGE UP (ref 19.9–79.3)
WBC # BLD: 13.11 K/UL — HIGH (ref 3.8–10.5)
WBC # FLD AUTO: 13.11 K/UL — HIGH (ref 3.8–10.5)

## 2019-05-01 PROCEDURE — 93320 DOPPLER ECHO COMPLETE: CPT | Mod: 26,GC

## 2019-05-01 PROCEDURE — 99233 SBSQ HOSP IP/OBS HIGH 50: CPT

## 2019-05-01 PROCEDURE — 93312 ECHO TRANSESOPHAGEAL: CPT | Mod: 26

## 2019-05-01 PROCEDURE — 99222 1ST HOSP IP/OBS MODERATE 55: CPT | Mod: GC

## 2019-05-01 PROCEDURE — 93325 DOPPLER ECHO COLOR FLOW MAPG: CPT | Mod: 26,GC

## 2019-05-01 RX ORDER — ISOSORBIDE DINITRATE 5 MG/1
10 TABLET ORAL THREE TIMES A DAY
Refills: 0 | Status: DISCONTINUED | OUTPATIENT
Start: 2019-05-01 | End: 2019-05-16

## 2019-05-01 RX ORDER — MIDAZOLAM HYDROCHLORIDE 1 MG/ML
1 INJECTION, SOLUTION INTRAMUSCULAR; INTRAVENOUS ONCE
Qty: 0 | Refills: 0 | Status: DISCONTINUED | OUTPATIENT
Start: 2019-05-01 | End: 2019-05-01

## 2019-05-01 RX ORDER — BUMETANIDE 0.25 MG/ML
1 INJECTION INTRAMUSCULAR; INTRAVENOUS DAILY
Qty: 0 | Refills: 0 | Status: DISCONTINUED | OUTPATIENT
Start: 2019-05-01 | End: 2019-05-03

## 2019-05-01 RX ADMIN — SENNA PLUS 2 TABLET(S): 8.6 TABLET ORAL at 22:41

## 2019-05-01 RX ADMIN — POLYETHYLENE GLYCOL 3350 17 GRAM(S): 17 POWDER, FOR SOLUTION ORAL at 17:17

## 2019-05-01 RX ADMIN — GABAPENTIN 100 MILLIGRAM(S): 400 CAPSULE ORAL at 17:15

## 2019-05-01 RX ADMIN — Medication 100 MILLIGRAM(S): at 06:03

## 2019-05-01 RX ADMIN — Medication 1 SPRAY(S): at 06:08

## 2019-05-01 RX ADMIN — Medication 20 MILLIGRAM(S): at 06:19

## 2019-05-01 RX ADMIN — Medication 81 MILLIGRAM(S): at 17:15

## 2019-05-01 RX ADMIN — MONTELUKAST 10 MILLIGRAM(S): 4 TABLET, CHEWABLE ORAL at 17:15

## 2019-05-01 RX ADMIN — ATORVASTATIN CALCIUM 40 MILLIGRAM(S): 80 TABLET, FILM COATED ORAL at 22:43

## 2019-05-01 RX ADMIN — Medication 50 MICROGRAM(S): at 06:05

## 2019-05-01 RX ADMIN — Medication 20 MILLIGRAM(S): at 17:21

## 2019-05-01 RX ADMIN — BUDESONIDE AND FORMOTEROL FUMARATE DIHYDRATE 2 PUFF(S): 160; 4.5 AEROSOL RESPIRATORY (INHALATION) at 12:14

## 2019-05-01 RX ADMIN — INSULIN GLARGINE 65 UNIT(S): 100 INJECTION, SOLUTION SUBCUTANEOUS at 22:41

## 2019-05-01 RX ADMIN — Medication 20 MILLIGRAM(S): at 22:42

## 2019-05-01 RX ADMIN — Medication 3: at 12:36

## 2019-05-01 RX ADMIN — HEPARIN SODIUM 5000 UNIT(S): 5000 INJECTION INTRAVENOUS; SUBCUTANEOUS at 22:43

## 2019-05-01 RX ADMIN — Medication 20 MILLIGRAM(S): at 06:03

## 2019-05-01 RX ADMIN — TICAGRELOR 90 MILLIGRAM(S): 90 TABLET ORAL at 06:08

## 2019-05-01 RX ADMIN — GABAPENTIN 100 MILLIGRAM(S): 400 CAPSULE ORAL at 06:03

## 2019-05-01 RX ADMIN — Medication 3 MILLILITER(S): at 05:29

## 2019-05-01 RX ADMIN — PANTOPRAZOLE SODIUM 40 MILLIGRAM(S): 20 TABLET, DELAYED RELEASE ORAL at 06:28

## 2019-05-01 RX ADMIN — Medication 100 MILLIGRAM(S): at 17:15

## 2019-05-01 RX ADMIN — MIDAZOLAM HYDROCHLORIDE 1 MILLIGRAM(S): 1 INJECTION, SOLUTION INTRAMUSCULAR; INTRAVENOUS at 13:36

## 2019-05-01 RX ADMIN — TICAGRELOR 90 MILLIGRAM(S): 90 TABLET ORAL at 17:15

## 2019-05-01 RX ADMIN — Medication 30 UNIT(S): at 16:46

## 2019-05-01 RX ADMIN — Medication 4: at 16:46

## 2019-05-01 RX ADMIN — Medication 1 SPRAY(S): at 22:44

## 2019-05-01 RX ADMIN — Medication 3 MILLILITER(S): at 16:11

## 2019-05-01 RX ADMIN — HEPARIN SODIUM 5000 UNIT(S): 5000 INJECTION INTRAVENOUS; SUBCUTANEOUS at 06:03

## 2019-05-01 RX ADMIN — POLYETHYLENE GLYCOL 3350 17 GRAM(S): 17 POWDER, FOR SOLUTION ORAL at 06:08

## 2019-05-01 RX ADMIN — Medication 2: at 08:43

## 2019-05-01 RX ADMIN — Medication 3 MILLILITER(S): at 22:24

## 2019-05-01 RX ADMIN — BUDESONIDE AND FORMOTEROL FUMARATE DIHYDRATE 2 PUFF(S): 160; 4.5 AEROSOL RESPIRATORY (INHALATION) at 22:32

## 2019-05-01 RX ADMIN — Medication 3 MILLILITER(S): at 12:13

## 2019-05-01 RX ADMIN — CHLORHEXIDINE GLUCONATE 1 APPLICATION(S): 213 SOLUTION TOPICAL at 09:00

## 2019-05-01 RX ADMIN — BUMETANIDE 2 MILLIGRAM(S): 0.25 INJECTION INTRAMUSCULAR; INTRAVENOUS at 06:23

## 2019-05-01 RX ADMIN — HEPARIN SODIUM 5000 UNIT(S): 5000 INJECTION INTRAVENOUS; SUBCUTANEOUS at 16:46

## 2019-05-01 RX ADMIN — ISOSORBIDE DINITRATE 10 MILLIGRAM(S): 5 TABLET ORAL at 22:43

## 2019-05-01 NOTE — PROGRESS NOTE ADULT - ASSESSMENT
73YOF with hx of CAD s/p CABG, hypothyroidism, CKD, CHF, HTN, DM p/w worsening ZEE and sob.    A/p  MERVIN  scr on admission 2.21  MERVIN Likely sec to  renal vasal congestion  Renal function relatively stable  Diuretics  per cardio  monitor bmp  avoid nephrotoxic agents  **If pt planned for cardiac cath , pt is 26% risk of developing NGHIA and 1.09% risk of requiring RRT. In view of fluid overload status, no IVF prior to cath. Recommend to hold morning dose of lasix on the day of procedure    CKD stage 3  baseline cr 1.5-1.8  likely sec to HTN/DM/chf  elevated PTH, r/o vit d def check vit d 25  phos optimal    CHF  monitor daily weight and i/o  diuretics per cardio  f/u cardio 73YOF with hx of CAD s/p CABG, hypothyroidism, CKD, CHF, HTN, DM p/w worsening ZEE and sob.    A/p  MERVIN  scr on admission 2.21  MERVIN Likely sec to  renal vasal congestion  Renal function uptreding  Diuretics  per cardio  monitor bmp  avoid nephrotoxic agents  **If pt planned for cardiac cath , pt is 26% risk of developing NGHIA and 1.09% risk of requiring RRT. In view of fluid overload status, no IVF prior to cath. Recommend to hold morning dose of lasix on the day of procedure    CKD stage 3  baseline cr 1.5-1.8  likely sec to HTN/DM/chf  elevated PTH, r/o vit d def check vit d 25  phos optimal    CHF  monitor daily weight and i/o  diuretics per cardio  f/u cardio

## 2019-05-01 NOTE — PROGRESS NOTE ADULT - ASSESSMENT
74 YO F w/o h/o CAD, s/p CABG Systolic CHF, CKD 3b, who p/w sob and pino.    - Acute on Chronic Systolic CHF Exacerbation:      - clinically improving      - c/w cardiac meds       - F/u LV to r/o endocarditis       - cardio/CCU management greatly appreciated       - tele     - NSTEMI:      - Continue asa/brilinta/statin.       - adjust management prn      - tele    -  shoulder and foot pain     - improving      - MERVIN on CKD      - stable. trend renal function.      - optimizre comorbidities. Avoid nephrotoxic rx     - DM II 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

## 2019-05-01 NOTE — PROGRESS NOTE ADULT - SUBJECTIVE AND OBJECTIVE BOX
SELVIN CLAIRE  [id number]    HISTORY OF PRESENT ILLNESS:  73yoF with history of CAD s/p CABG, systolic CHF (EF 20%), CKD III, who p/w sob and pino. Admitted for acute decompensated systolic heart failure. Hospital course complicated by hypotension in the setting of beta blocker administration during acute decompensated HF, possible PNA (on levofloxacin)       PAST MEDICAL & SURGICAL HISTORY:  GERD (gastroesophageal reflux disease)  CAD (coronary artery disease): s/p CABG  Hypothyroidism  Hyperlipidemia  Diabetes mellitus, type 2  S/P CABG (coronary artery bypass graft)      Review of Systems:  Gen:  No fevers/chills, weight loss  HEENT: No blurry vision, no difficulty swallowing  CVS: No chest pain/palpitations  Resp: No SOB/wheezing  GI: No N/V/C/D/abdominal pain  MSK:  Skin: No new rashes  Neuro: No headaches    MEDICATIONS  (STANDING):  ALBUTerol/ipratropium for Nebulization 3 milliLiter(s) Nebulizer every 6 hours  aspirin enteric coated 81 milliGRAM(s) Oral daily  atorvastatin 40 milliGRAM(s) Oral at bedtime  buDESOnide 160 MICROgram(s)/formoterol 4.5 MICROgram(s) Inhaler 2 Puff(s) Inhalation two times a day  buMETAnide 1 milliGRAM(s) Oral daily  chlorhexidine 4% Liquid 1 Application(s) Topical <User Schedule>  dextrose 5%. 1000 milliLiter(s) (50 mL/Hr) IV Continuous <Continuous>  dextrose 50% Injectable 12.5 Gram(s) IV Push once  dextrose 50% Injectable 25 Gram(s) IV Push once  dextrose 50% Injectable 25 Gram(s) IV Push once  docusate sodium 100 milliGRAM(s) Oral two times a day  gabapentin 100 milliGRAM(s) Oral two times a day  heparin  Injectable 5000 Unit(s) SubCutaneous every 8 hours  hydrALAZINE 20 milliGRAM(s) Oral every 8 hours  influenza   Vaccine 0.5 milliLiter(s) IntraMuscular once  insulin glargine Injectable (LANTUS) 65 Unit(s) SubCutaneous at bedtime  insulin lispro (HumaLOG) corrective regimen sliding scale   SubCutaneous three times a day before meals  insulin lispro (HumaLOG) corrective regimen sliding scale   SubCutaneous at bedtime  insulin lispro Injectable (HumaLOG) 30 Unit(s) SubCutaneous three times a day before meals  isosorbide   dinitrate Tablet (ISORDIL) 10 milliGRAM(s) Oral three times a day  levoFLOXacin  Tablet 500 milliGRAM(s) Oral every 48 hours  levothyroxine 50 MICROGram(s) Oral daily  montelukast 10 milliGRAM(s) Oral daily  pantoprazole    Tablet 40 milliGRAM(s) Oral before breakfast  polyethylene glycol 3350 17 Gram(s) Oral two times a day  predniSONE   Tablet 20 milliGRAM(s) Oral daily  senna 2 Tablet(s) Oral at bedtime  sodium chloride 0.65% Nasal 1 Spray(s) Both Nostrils three times a day  ticagrelor 90 milliGRAM(s) Oral two times a day    MEDICATIONS  (PRN):  acetaminophen   Tablet .. 650 milliGRAM(s) Oral every 6 hours PRN Mild Pain (1 - 3), Moderate Pain (4 - 6), Severe Pain (7 - 10)  ALBUTerol/ipratropium for Nebulization. 3 milliLiter(s) Nebulizer once PRN Shortness of Breath  dextrose 40% Gel 15 Gram(s) Oral once PRN Blood Glucose LESS THAN 70 milliGRAM(s)/deciliter  diphenhydrAMINE 25 milliGRAM(s) Oral every 4 hours PRN Rash and/or Itching  glucagon  Injectable 1 milliGRAM(s) IntraMuscular once PRN Glucose LESS THAN 70 milligrams/deciliter  ondansetron Injectable 4 milliGRAM(s) IV Push once PRN Nausea and/or Vomiting      Allergies    azithromycin (Hives; Pruritus)    Intolerances        PERTINENT MEDICATION HISTORY:  · 	furosemide 40 mg oral tablet: 1 tab(s) orally every 12 hours  · 	aspirin 81 mg oral delayed release tablet: 1 tab(s) orally once a day    SOCIAL HISTORY:  ,   in [facility], She lives with daughter and son-in-law  Does not smoke nor drink    FAMILY HISTORY:  Family history of hypertension (Sibling): sister  Family history of diabetes mellitus (Sibling): brothers/sisters      Vital Signs Last 24 Hrs  T(C): 36.8 (01 May 2019 11:38), Max: 37 (2019 16:00)  T(F): 98.3 (01 May 2019 11:38), Max: 98.6 (2019 16:00)  HR: 101 (01 May 2019 14:08) (82 - 105)  BP: 113/46 (01 May 2019 14:08) (95/53 - 149/77)  BP(mean): 66 (01 May 2019 14:08) (65 - 99)  RR: 34 (01 May 2019 14:08) (12 - 34)  SpO2: 99% (01 May 2019 14:08) (93% - 100%)    Daily     Daily Weight in k.2 (01 May 2019 06:00)    Physical Exam:  General: No apparent distress  HEENT: EOMI, MMM  CVS: +S1/S2, RRR  Resp: CTA b/l  GI: Soft, NT/ND  MSK:    Neuro: AAOx3  muscle strength RUE LUE ; RLE LLE   Skin: no  rashes    LABS:                        9.0    13.11 )-----------( 365      ( 01 May 2019 04:00 )             29.3     05-01    137  |  99  |  73<H>  ----------------------------<  250<H>  4.2   |  20<L>  |  2.26<H>    Ca    9.3      01 May 2019 07:45  Phos  4.3     05-  Mg     2.6     05-    TPro  8.2  /  Alb  3.9  /  TBili  0.4  /  DBili  x   /  AST  38<H>  /  ALT  14  /  AlkPhos  61  04-          RADIOLOGY & ADDITIONAL STUDIES:    < from: Xray Foot AP + Lateral + Oblique, Bilat (..19 @ 13:53) >  EXAM:  RAD FOOT MIN 3 VIEWS BI        PROCEDURE DATE:  2019         INTERPRETATION:  CLINICAL INDICATION: CAD; bilateral foot pain    EXAM:  Portable frontal oblique and lateral views of both feet from 2019 at   1353. Compared to prior study from 2018.    IMPRESSION:  No tracking soft tissue gas collections, radiopaque foreign bodies, or   gross radiographic evidence for osteomyelitis.    No fractures or dislocations.    Tarsometatarsal alignment maintained without evidence fora Lisfranc   injury.    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 Preserved remaining joint spaces   and no joint margin erosions.    Bilateral posterosuperior calcaneal Víctor deformities and tiny   bilateral plantar calcaneal enthesophytes.    Generalized osteopenia otherwise no discrete lytic or blastic lesions.    Scant vascular calcifications.                  BLU PATTERSON M.D., ATTENDING RADIOLOGIST  This document has been electronically signed. 2019  3:16PM                  < end of copied text >

## 2019-05-01 NOTE — CONSULT NOTE ADULT - ATTENDING COMMENTS
Patient seen and examined at bedside. Agree with assessment and plan as stated above. Please call [phone] for any questions or concerns
Briefly, 74 y/o female with h/o HTN, DM, CAD s/p CABG (w/ prior PCI to Ramus; LIMA-LAD patent), HFrEF (LVEF 25%, LVEDD 5.2 cm), likely mod-severe MR, severe pulm HTN comes in with cough and SOB. CT chest showed ground glass opacities, patient was started on IV zithromax, developed rash, now on Levaquin She was admitted to telemetry was given a dose of lopressor, patient became hypotensive and went into respiratory distress, patient was then moved to CCU. HF was consulted to help manage this patient who is SOB and is hypotensive. She admits to SOB at rest, orthopnea, cough. Denies fevers and sick contacts. No CP, palpitations, dizziness. On exam, slightly tachypneic, JVP approx 8-10 with HJR, wheeze b/l, abdom nontender, no pedal edema, WWP. Labs reviewed. EKG reviewed. TTE - EF 25%, LVEDD 5.2 cm, mod MR (? underestimated; likely ischemic). Overall stage C HF, NYHA class IV w/ possible pulmonary edema 2/2 ischemic MR  - diuresis as above  - hydral 5 mg q8h as tolerated  - hold BB  - consider LV when more stable to assess MR severity    Critical care time = 60 min

## 2019-05-01 NOTE — PROGRESS NOTE ADULT - SUBJECTIVE AND OBJECTIVE BOX
Patient seen and examined at bedside  No acute events noted overnight  Case discussed with medical team    HPI:  73YOF with hx of CAD s/p CABG, hypothyroidism, CKD, CHF, HTN, DM p/w worsening ZEE and sob. Patient usually can walk up a few steps before feeling sob, currently feeling sob after walking from living room to kitchen.  She is using 3 pillows to sleep. No cough, fevers. (+) CP, SS and constant, She experienced more ZEE for last two days.  She has no edema in lower extremety.  Last admission to [facility] was 11/18. (24 Apr 2019 10:21)      PAST MEDICAL & SURGICAL HISTORY:  GERD (gastroesophageal reflux disease)  CAD (coronary artery disease): s/p CABG  Hypothyroidism  Hyperlipidemia  Diabetes mellitus, type 2  S/P CABG (coronary artery bypass graft)      azithromycin (Hives; Pruritus)       MEDICATIONS  (STANDING):  ALBUTerol/ipratropium for Nebulization 3 milliLiter(s) Nebulizer every 6 hours  aspirin enteric coated 81 milliGRAM(s) Oral daily  atorvastatin 40 milliGRAM(s) Oral at bedtime  buDESOnide 160 MICROgram(s)/formoterol 4.5 MICROgram(s) Inhaler 2 Puff(s) Inhalation two times a day  buMETAnide 1 milliGRAM(s) Oral daily  chlorhexidine 4% Liquid 1 Application(s) Topical <User Schedule>  dextrose 5%. 1000 milliLiter(s) (50 mL/Hr) IV Continuous <Continuous>  dextrose 50% Injectable 12.5 Gram(s) IV Push once  dextrose 50% Injectable 25 Gram(s) IV Push once  dextrose 50% Injectable 25 Gram(s) IV Push once  docusate sodium 100 milliGRAM(s) Oral two times a day  gabapentin 100 milliGRAM(s) Oral two times a day  heparin  Injectable 5000 Unit(s) SubCutaneous every 8 hours  hydrALAZINE 20 milliGRAM(s) Oral every 8 hours  influenza   Vaccine 0.5 milliLiter(s) IntraMuscular once  insulin glargine Injectable (LANTUS) 65 Unit(s) SubCutaneous at bedtime  insulin lispro (HumaLOG) corrective regimen sliding scale   SubCutaneous three times a day before meals  insulin lispro (HumaLOG) corrective regimen sliding scale   SubCutaneous at bedtime  insulin lispro Injectable (HumaLOG) 30 Unit(s) SubCutaneous three times a day before meals  isosorbide   dinitrate Tablet (ISORDIL) 10 milliGRAM(s) Oral three times a day  levoFLOXacin  Tablet 500 milliGRAM(s) Oral every 48 hours  levothyroxine 50 MICROGram(s) Oral daily  montelukast 10 milliGRAM(s) Oral daily  pantoprazole    Tablet 40 milliGRAM(s) Oral before breakfast  polyethylene glycol 3350 17 Gram(s) Oral two times a day  predniSONE   Tablet 20 milliGRAM(s) Oral daily  senna 2 Tablet(s) Oral at bedtime  sodium chloride 0.65% Nasal 1 Spray(s) Both Nostrils three times a day  ticagrelor 90 milliGRAM(s) Oral two times a day    MEDICATIONS  (PRN):  acetaminophen   Tablet .. 650 milliGRAM(s) Oral every 6 hours PRN Mild Pain (1 - 3), Moderate Pain (4 - 6), Severe Pain (7 - 10)  ALBUTerol/ipratropium for Nebulization. 3 milliLiter(s) Nebulizer once PRN Shortness of Breath  dextrose 40% Gel 15 Gram(s) Oral once PRN Blood Glucose LESS THAN 70 milliGRAM(s)/deciliter  diphenhydrAMINE 25 milliGRAM(s) Oral every 4 hours PRN Rash and/or Itching  glucagon  Injectable 1 milliGRAM(s) IntraMuscular once PRN Glucose LESS THAN 70 milligrams/deciliter  ondansetron Injectable 4 milliGRAM(s) IV Push once PRN Nausea and/or Vomiting      REVIEW OF SYSTEMS:  CONSTITUTIONAL: (+) malaise. fatigue  EYES: No acute change in vision   ENT:  No tinnitus  NECK: No stiffness  RESPIRATORY: zee. No hemoptysis  CARDIOVASCULAR: decreased exercise tolerance. No active chest pain, palpitations, syncope  GASTROINTESTINAL: No hematemesis, diarrhea, melena, or hematochezia.  GENITOURINARY: No hematuria  NEUROLOGICAL: No headaches  LYMPH Nodes: No enlarged glands  ENDOCRINE: No heat or cold intolerance	    T(C): 36.7 (05-01-19 @ 08:17), Max: 37 (04-30-19 @ 16:00)  HR: 99 (05-01-19 @ 10:00) (82 - 102)  BP: 109/61 (05-01-19 @ 10:00) (100/64 - 149/77)  RR: 24 (05-01-19 @ 10:00) (14 - 29)  SpO2: 99% (05-01-19 @ 10:00) (93% - 100%)    PHYSICAL EXAMINATION:   Constitutional: NAD  HEENT: AT  Neck:  Supple  Respiratory:  Adequate airflow b/l. Not using accessory muscles of respiration.  Cardiovascular:  systolic murmur, S1 & S2 intact, no R/G, 2+ radial pulses b/l  Gastrointestinal: Soft, NT, ND, normoactive b.s., no organomegaly/RT/rigidity  Extremities: WWP  Neurological:  Alert and awake.  No acute focal motor deficits. Crude sensation intact.     Labs and imaging reviewed    LABS:                        9.0    13.11 )-----------( 365      ( 01 May 2019 04:00 )             29.3     05-01    137  |  99  |  73<H>  ----------------------------<  250<H>  4.2   |  20<L>  |  2.26<H>    Ca    9.3      01 May 2019 07:45  Phos  4.3     05-01  Mg     2.6     05-01    TPro  8.2  /  Alb  3.9  /  TBili  0.4  /  DBili  x   /  AST  38<H>  /  ALT  14  /  AlkPhos  61  04-29            CAPILLARY BLOOD GLUCOSE      POCT Blood Glucose.: 247 mg/dL (01 May 2019 08:01)  POCT Blood Glucose.: 182 mg/dL (30 Apr 2019 22:10)  POCT Blood Glucose.: 135 mg/dL (30 Apr 2019 16:51)  POCT Blood Glucose.: 195 mg/dL (30 Apr 2019 11:22)        LIVER FUNCTIONS - ( 29 Apr 2019 23:10 )  Alb: 3.9 g/dL / Pro: 8.2 g/dL / ALK PHOS: 61 u/L / ALT: 14 u/L / AST: 38 u/L / GGT: x               RADIOLOGY & ADDITIONAL STUDIES:

## 2019-05-01 NOTE — PROGRESS NOTE ADULT - PROBLEM SELECTOR PLAN 1
- patient still euvolemic. Switch bumex 2mg IV BID to 1mg PO QD   - On TTE, Severe LV dysfunction. Severe pulm HTN. mod-severe MR. Will do LV    - hold beta blockers in the setting of acute decompensated heart failure  - strict I/O  - No ACE/ARB given MERVIN   - increase hydralazine 10mg q8h to hydralazine 20mg q8h. Will start isordil 10mg PO TID after LV   - c/w Oscar  - LV tomorrow to assess valves - patient still euvolemic. Switch bumex 2mg IV BID to 1mg PO QD   - On TTE, Severe LV dysfunction. Severe pulm HTN. mod-severe MR. Will do LV    - hold beta blockers in the setting of acute decompensated heart failure  - strict I/O  - No ACE/ARB given MERVIN   - increase hydralazine 10mg q8h to hydralazine 20mg q8h. Will start isordil 10mg PO TID after LV   - c/w Oscar  - LV today

## 2019-05-01 NOTE — PROGRESS NOTE ADULT - SUBJECTIVE AND OBJECTIVE BOX
Patient seen and examined. She had no complaints. Foot pain is better.       Medications:  acetaminophen   Tablet .. 650 milliGRAM(s) Oral every 6 hours PRN  ALBUTerol/ipratropium for Nebulization 3 milliLiter(s) Nebulizer every 6 hours  ALBUTerol/ipratropium for Nebulization. 3 milliLiter(s) Nebulizer once PRN  aspirin enteric coated 81 milliGRAM(s) Oral daily  atorvastatin 40 milliGRAM(s) Oral at bedtime  buDESOnide 160 MICROgram(s)/formoterol 4.5 MICROgram(s) Inhaler 2 Puff(s) Inhalation two times a day  buMETAnide 1 milliGRAM(s) Oral daily  chlorhexidine 4% Liquid 1 Application(s) Topical <User Schedule>  dextrose 40% Gel 15 Gram(s) Oral once PRN  dextrose 5%. 1000 milliLiter(s) IV Continuous <Continuous>  dextrose 50% Injectable 12.5 Gram(s) IV Push once  dextrose 50% Injectable 25 Gram(s) IV Push once  dextrose 50% Injectable 25 Gram(s) IV Push once  diphenhydrAMINE 25 milliGRAM(s) Oral every 4 hours PRN  docusate sodium 100 milliGRAM(s) Oral two times a day  gabapentin 100 milliGRAM(s) Oral two times a day  glucagon  Injectable 1 milliGRAM(s) IntraMuscular once PRN  heparin  Injectable 5000 Unit(s) SubCutaneous every 8 hours  hydrALAZINE 20 milliGRAM(s) Oral every 8 hours  influenza   Vaccine 0.5 milliLiter(s) IntraMuscular once  insulin glargine Injectable (LANTUS) 65 Unit(s) SubCutaneous at bedtime  insulin lispro (HumaLOG) corrective regimen sliding scale   SubCutaneous three times a day before meals  insulin lispro (HumaLOG) corrective regimen sliding scale   SubCutaneous at bedtime  insulin lispro Injectable (HumaLOG) 30 Unit(s) SubCutaneous three times a day before meals  isosorbide   dinitrate Tablet (ISORDIL) 10 milliGRAM(s) Oral three times a day  levoFLOXacin  Tablet 500 milliGRAM(s) Oral every 48 hours  levothyroxine 50 MICROGram(s) Oral daily  montelukast 10 milliGRAM(s) Oral daily  ondansetron Injectable 4 milliGRAM(s) IV Push once PRN  pantoprazole    Tablet 40 milliGRAM(s) Oral before breakfast  polyethylene glycol 3350 17 Gram(s) Oral two times a day  predniSONE   Tablet 20 milliGRAM(s) Oral daily  senna 2 Tablet(s) Oral at bedtime  sodium chloride 0.65% Nasal 1 Spray(s) Both Nostrils three times a day  ticagrelor 90 milliGRAM(s) Oral two times a day      Vitals:  Vital Signs Last 24 Hrs  T(C): 36.8 (01 May 2019 11:38), Max: 36.8 (2019 19:45)  T(F): 98.3 (01 May 2019 11:38), Max: 98.3 (01 May 2019 11:38)  HR: 97 (01 May 2019 16:11) (82 - 105)  BP: 110/50 (01 May 2019 15:00) (95/53 - 149/77)  BP(mean): 68 (01 May 2019 15:00) (65 - 99)  RR: 26 (01 May 2019 15:00) (12 - 34)  SpO2: 99% (01 May 2019 16:11) (93% - 100%)    Daily     Daily Weight in k.2 (01 May 2019 06:00)    I&O's Detail    2019 07:01  -  01 May 2019 07:00  --------------------------------------------------------  IN:    Oral Fluid: 300 mL  Total IN: 300 mL    OUT:    Indwelling Catheter - Urethral: 1755 mL  Total OUT: 1755 mL    Total NET: -1455 mL          Physical Exam:     General: No distress. Comfortable.  HEENT: EOM intact.  Neck: Neck supple. JVP normal. No masses  Chest: Clear to auscultation bilaterally  CV: Normal S1 and S2. No murmurs, rub, or gallops. Radial pulses normal. No LE edema b/l.   Abdomen: Soft, non-distended, non-tender  Skin: No rashes or skin breakdown  Neurology: Alert and oriented times three. Sensation intact  Psych: Affect normal    Labs:                        9.0    13.11 )-----------( 365      ( 01 May 2019 04:00 )             29.3     05-    137  |  99  |  73<H>  ----------------------------<  250<H>  4.2   |  20<L>  |  2.26<H>    Ca    9.3      01 May 2019 07:45  Phos  4.3     -  Mg     2.6         TPro  8.2  /  Alb  3.9  /  TBili  0.4  /  DBili  x   /  AST  38<H>  /  ALT  14  /  AlkPhos  61

## 2019-05-01 NOTE — PROGRESS NOTE ADULT - PROBLEM SELECTOR PLAN 3
- patient with SOB, +cough, CT with ground glass opacities   - levofloxacin (renally dosed) for CAP (currently day 6). Will do 7d course as per ID

## 2019-05-01 NOTE — PROGRESS NOTE ADULT - SUBJECTIVE AND OBJECTIVE BOX
[facility]   MD RAHEEL SHAH MD ANGELA WONG, PA    TEL:  OFFICE: [phone]  DR SANTOYO CELL: [phone]  DR. NGUYEN CELL: [phone]  UMM KENDALL CELL: [phone]        Patient is a 73y old  Female who presents with a chief complaint of sob (01 May 2019 10:41)      Patient seen and examined at bedside. chest pain/sob improved today    VITALS:  T(F): 98.3 (05-01-19 @ 11:38), Max: 98.6 (04-30-19 @ 16:00)  HR: 103 (05-01-19 @ 13:00)  BP: 110/57 (05-01-19 @ 13:00)  RR: 24 (05-01-19 @ 13:00)  SpO2: 98% (05-01-19 @ 13:00)  Wt(kg): --    04-30 @ 07:01  -  05-01 @ 07:00  --------------------------------------------------------  IN: 300 mL / OUT: 1755 mL / NET: -1455 mL          PHYSICAL EXAM:  Constitutional: NAD  Neck: No JVD  Respiratory: slight basilar crackles  Cardiovascular: S1, S2, RRR  Gastrointestinal: BS+, soft, NT/ND  Extremities: No peripheral edema    Hospital Medications:   MEDICATIONS  (STANDING):  ALBUTerol/ipratropium for Nebulization 3 milliLiter(s) Nebulizer every 6 hours  aspirin enteric coated 81 milliGRAM(s) Oral daily  atorvastatin 40 milliGRAM(s) Oral at bedtime  buDESOnide 160 MICROgram(s)/formoterol 4.5 MICROgram(s) Inhaler 2 Puff(s) Inhalation two times a day  buMETAnide 1 milliGRAM(s) Oral daily  chlorhexidine 4% Liquid 1 Application(s) Topical <User Schedule>  dextrose 5%. 1000 milliLiter(s) (50 mL/Hr) IV Continuous <Continuous>  dextrose 50% Injectable 12.5 Gram(s) IV Push once  dextrose 50% Injectable 25 Gram(s) IV Push once  dextrose 50% Injectable 25 Gram(s) IV Push once  docusate sodium 100 milliGRAM(s) Oral two times a day  gabapentin 100 milliGRAM(s) Oral two times a day  heparin  Injectable 5000 Unit(s) SubCutaneous every 8 hours  hydrALAZINE 20 milliGRAM(s) Oral every 8 hours  influenza   Vaccine 0.5 milliLiter(s) IntraMuscular once  insulin glargine Injectable (LANTUS) 65 Unit(s) SubCutaneous at bedtime  insulin lispro (HumaLOG) corrective regimen sliding scale   SubCutaneous three times a day before meals  insulin lispro (HumaLOG) corrective regimen sliding scale   SubCutaneous at bedtime  insulin lispro Injectable (HumaLOG) 30 Unit(s) SubCutaneous three times a day before meals  isosorbide   dinitrate Tablet (ISORDIL) 10 milliGRAM(s) Oral three times a day  levoFLOXacin  Tablet 500 milliGRAM(s) Oral every 48 hours  levothyroxine 50 MICROGram(s) Oral daily  midazolam Injectable 1 milliGRAM(s) IV Push once  montelukast 10 milliGRAM(s) Oral daily  pantoprazole    Tablet 40 milliGRAM(s) Oral before breakfast  polyethylene glycol 3350 17 Gram(s) Oral two times a day  predniSONE   Tablet 20 milliGRAM(s) Oral daily  senna 2 Tablet(s) Oral at bedtime  sodium chloride 0.65% Nasal 1 Spray(s) Both Nostrils three times a day  ticagrelor 90 milliGRAM(s) Oral two times a day      LABS:  05-01    137  |  99  |  73<H>  ----------------------------<  250<H>  4.2   |  20<L>  |  2.26<H>    Ca    9.3      01 May 2019 07:45  Phos  4.3     05-01  Mg     2.6     05-01    TPro  8.2  /  Alb  3.9  /  TBili  0.4  /  DBili      /  AST  38<H>  /  ALT  14  /  AlkPhos  61  04-29    Creatinine Trend: 2.26 <--, 2.13 <--, 2.19 <--, 2.08 <--, 2.06 <--, 2.09 <--, 2.03 <--, 2.02 <--, 1.98 <--, 2.06 <--, 1.97 <--, 2.06 <--, 1.92 <--, 1.89 <--, 1.91 <--    Phosphorus Level, Serum: 4.3 mg/dL (05-01 @ 07:45)  Phosphorus Level, Serum: 4.3 mg/dL (05-01 @ 04:00)  Phosphorus Level, Serum: 4.1 mg/dL (04-30 @ 19:45)                              9.0    13.11 )-----------( 365      ( 01 May 2019 04:00 )             29.3     Urine Studies:  Urinalysis - [04-25-19 @ 09:30]      Color LIGHT YELLOW / Appearance CLEAR / SG 1.011 / pH 6.0      Gluc 70 / Ketone NEGATIVE  / Bili NEGATIVE / Urobili NORMAL       Blood NEGATIVE / Protein NEGATIVE / Leuk Est NEGATIVE / Nitrite NEGATIVE      RBC  / WBC  / Hyaline  / Gran  / Sq Epi  / Non Sq Epi  / Bacteria       .4 (Ca --)      [04-25-19 @ 05:45]   --  PTH 43.55 (Ca --)      [09-10-18 @ 07:15]   --  PTH 36.60 (Ca --)      [09-08-18 @ 03:15]   --  Vitamin D (25OH) 15.8      [09-08-18 @ 03:15]  HbA1c 7.3      [04-25-19 @ 05:45]  TSH 1.59      [04-25-19 @ 05:45]  Lipid: chol 127, , HDL 22, LDL 67      [04-24-19 @ 12:21]    HCV 0.06, Nonreactive Hepatitis C AB  S/CO Ratio                        Interpretation  < 1.00                                   Non-Reactive  1.00 - 4.99                         Weakly-Reactive  > 5.00                                Reactive  Non-Reactive: A person with a non-reactive HCV antibody  result is considered uninfected.  No further action is  needed unless recent infection is suspected.  In these  cases, consider repeat testing later to detect  seroconversion..  Weakly-Reactive: HCV antibody test is abnormal, HCV RNA  Qualitative test will follow.  Reactive: HCV antibody test is abnormal, HCV RNA  Qualitative test will follow.  Note: HCV antibody testing is performed on the [employer]   system.      [04-25-19 @ 05:45]      RADIOLOGY & ADDITIONAL STUDIES:

## 2019-05-01 NOTE — PROGRESS NOTE ADULT - ATTENDING COMMENTS
Patient seen and examined  Agree with above assessment and plan  For LV today to evaluate severity of MR given LV dysfunction  Cont hydralazine and isordil  Cont bumex BID

## 2019-05-01 NOTE — PROGRESS NOTE ADULT - ASSESSMENT
73yoF with history of CAD s/p CABG, systolic CHF (EF 20%), CKD III, who p/w sob and pino. Admitted for acute decompensated systolic heart failure. Hospital course complicated by hypotension in the setting of beta blocker administration during acute decompensated HF, possible PNA (on levofloxacin)

## 2019-05-01 NOTE — CONSULT NOTE ADULT - SUBJECTIVE AND OBJECTIVE BOX
SELVIN CLAIRE  [id number]    HISTORY OF PRESENT ILLNESS:  73yoF with history of CAD s/p CABG, systolic CHF (EF 20%), CKD III, who p/w sob and pino. Admitted for acute decompensated systolic heart failure. Hospital course complicated by hypotension in the setting of beta blocker administration during acute decompensated HF, possible PNA (on levofloxacin)       PAST MEDICAL & SURGICAL HISTORY:  GERD (gastroesophageal reflux disease)  CAD (coronary artery disease): s/p CABG  Hypothyroidism  Hyperlipidemia  Diabetes mellitus, type 2  S/P CABG (coronary artery bypass graft)      Review of Systems:  Gen:  No fevers/chills, weight loss  HEENT: No blurry vision, no difficulty swallowing  CVS: No chest pain/palpitations  Resp: No SOB/wheezing  GI: No N/V/C/D/abdominal pain  MSK:  Skin: No new rashes  Neuro: No headaches    MEDICATIONS  (STANDING):  ALBUTerol/ipratropium for Nebulization 3 milliLiter(s) Nebulizer every 6 hours  aspirin enteric coated 81 milliGRAM(s) Oral daily  atorvastatin 40 milliGRAM(s) Oral at bedtime  buDESOnide 160 MICROgram(s)/formoterol 4.5 MICROgram(s) Inhaler 2 Puff(s) Inhalation two times a day  buMETAnide 1 milliGRAM(s) Oral daily  chlorhexidine 4% Liquid 1 Application(s) Topical <User Schedule>  dextrose 5%. 1000 milliLiter(s) (50 mL/Hr) IV Continuous <Continuous>  dextrose 50% Injectable 12.5 Gram(s) IV Push once  dextrose 50% Injectable 25 Gram(s) IV Push once  dextrose 50% Injectable 25 Gram(s) IV Push once  docusate sodium 100 milliGRAM(s) Oral two times a day  gabapentin 100 milliGRAM(s) Oral two times a day  heparin  Injectable 5000 Unit(s) SubCutaneous every 8 hours  hydrALAZINE 20 milliGRAM(s) Oral every 8 hours  influenza   Vaccine 0.5 milliLiter(s) IntraMuscular once  insulin glargine Injectable (LANTUS) 65 Unit(s) SubCutaneous at bedtime  insulin lispro (HumaLOG) corrective regimen sliding scale   SubCutaneous three times a day before meals  insulin lispro (HumaLOG) corrective regimen sliding scale   SubCutaneous at bedtime  insulin lispro Injectable (HumaLOG) 30 Unit(s) SubCutaneous three times a day before meals  isosorbide   dinitrate Tablet (ISORDIL) 10 milliGRAM(s) Oral three times a day  levoFLOXacin  Tablet 500 milliGRAM(s) Oral every 48 hours  levothyroxine 50 MICROGram(s) Oral daily  montelukast 10 milliGRAM(s) Oral daily  pantoprazole    Tablet 40 milliGRAM(s) Oral before breakfast  polyethylene glycol 3350 17 Gram(s) Oral two times a day  predniSONE   Tablet 20 milliGRAM(s) Oral daily  senna 2 Tablet(s) Oral at bedtime  sodium chloride 0.65% Nasal 1 Spray(s) Both Nostrils three times a day  ticagrelor 90 milliGRAM(s) Oral two times a day    MEDICATIONS  (PRN):  acetaminophen   Tablet .. 650 milliGRAM(s) Oral every 6 hours PRN Mild Pain (1 - 3), Moderate Pain (4 - 6), Severe Pain (7 - 10)  ALBUTerol/ipratropium for Nebulization. 3 milliLiter(s) Nebulizer once PRN Shortness of Breath  dextrose 40% Gel 15 Gram(s) Oral once PRN Blood Glucose LESS THAN 70 milliGRAM(s)/deciliter  diphenhydrAMINE 25 milliGRAM(s) Oral every 4 hours PRN Rash and/or Itching  glucagon  Injectable 1 milliGRAM(s) IntraMuscular once PRN Glucose LESS THAN 70 milligrams/deciliter  ondansetron Injectable 4 milliGRAM(s) IV Push once PRN Nausea and/or Vomiting      Allergies    azithromycin (Hives; Pruritus)    Intolerances        PERTINENT MEDICATION HISTORY:  · 	furosemide 40 mg oral tablet: 1 tab(s) orally every 12 hours  · 	aspirin 81 mg oral delayed release tablet: 1 tab(s) orally once a day    SOCIAL HISTORY:  ,   in [facility], She lives with daughter and son-in-law  Does not smoke nor drink    FAMILY HISTORY:  Family history of hypertension (Sibling): sister  Family history of diabetes mellitus (Sibling): brothers/sisters      Vital Signs Last 24 Hrs  T(C): 36.8 (01 May 2019 11:38), Max: 37 (2019 16:00)  T(F): 98.3 (01 May 2019 11:38), Max: 98.6 (2019 16:00)  HR: 101 (01 May 2019 14:08) (82 - 105)  BP: 113/46 (01 May 2019 14:08) (95/53 - 149/77)  BP(mean): 66 (01 May 2019 14:08) (65 - 99)  RR: 34 (01 May 2019 14:08) (12 - 34)  SpO2: 99% (01 May 2019 14:08) (93% - 100%)    Daily     Daily Weight in k.2 (01 May 2019 06:00)    Physical Exam:  General: No apparent distress  HEENT: EOMI, MMM  CVS: +S1/S2, RRR  Resp: CTA b/l  GI: Soft, NT/ND  MSK:    Neuro: AAOx3  muscle strength RUE LUE ; RLE LLE   Skin: no  rashes    LABS:                        9.0    13.11 )-----------( 365      ( 01 May 2019 04:00 )             29.3     05-01    137  |  99  |  73<H>  ----------------------------<  250<H>  4.2   |  20<L>  |  2.26<H>    Ca    9.3      01 May 2019 07:45  Phos  4.3     05-  Mg     2.6     05-01    TPro  8.2  /  Alb  3.9  /  TBili  0.4  /  DBili  x   /  AST  38<H>  /  ALT  14  /  AlkPhos  61  04-29    Uric Acid, Serum (19 @ 04:30)    Uric Acid, Serum: 12.8 mg/dL          RADIOLOGY & ADDITIONAL STUDIES:    < from: Xray Foot AP + Lateral + Oblique, Bilat (19 @ 13:53) >  EXAM:  RAD FOOT MIN 3 VIEWS BI        PROCEDURE DATE:  2019         INTERPRETATION:  CLINICAL INDICATION: CAD; bilateral foot pain    EXAM:  Portable frontal oblique and lateral views of both feet from 2019 at   1353. Compared to prior study from 2018.    IMPRESSION:  No tracking soft tissue gas collections, radiopaque foreign bodies, or   gross radiographic evidence for osteomyelitis.    No fractures or dislocations.    Tarsometatarsal alignment maintained without evidence fora Lisfranc   injury.    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 Preserved remaining joint spaces   and no joint margin erosions.    Bilateral posterosuperior calcaneal Víctor deformities and tiny   bilateral plantar calcaneal enthesophytes.    Generalized osteopenia otherwise no discrete lytic or blastic lesions.    Scant vascular calcifications.                  BLU PATTERSON M.D., ATTENDING RADIOLOGIST  This document has been electronically signed. 2019  3:16PM                  < end of copied text > SELVIN CLAIRE  [id number]    HISTORY OF PRESENT ILLNESS:  73yoF with history of CAD s/p CABG, systolic CHF (EF 20%), CKD III, who p/w sob. Admitted for acute decompensated systolic heart failure. Hospital course complicated by hypotension in the setting of beta blocker administration during acute decompensated HF, possible PNA (on levofloxacin). Rheumatology consulted for L foot pain. Pain started 2-3 days ago. Dorsal aspect of foot. No trauma. Pt did not notice swelling, or erythema. Pt says this happened to her 1 month ago, given medication, does not know name. Does not eat a lot of red meat, drink alcohol, no seafood. Pt has been diuresed here repeatedly. No fevers, chills. Pt says she improved 50%. Pt says now she has L shoulder pain, but no swelling. Can move shoulder.       PAST MEDICAL & SURGICAL HISTORY:  GERD (gastroesophageal reflux disease)  CAD (coronary artery disease): s/p CABG  Hypothyroidism  Hyperlipidemia  Diabetes mellitus, type 2  S/P CABG (coronary artery bypass graft)      Review of Systems:  per HPI     MEDICATIONS  (STANDING):  ALBUTerol/ipratropium for Nebulization 3 milliLiter(s) Nebulizer every 6 hours  aspirin enteric coated 81 milliGRAM(s) Oral daily  atorvastatin 40 milliGRAM(s) Oral at bedtime  buDESOnide 160 MICROgram(s)/formoterol 4.5 MICROgram(s) Inhaler 2 Puff(s) Inhalation two times a day  buMETAnide 1 milliGRAM(s) Oral daily  chlorhexidine 4% Liquid 1 Application(s) Topical <User Schedule>  dextrose 5%. 1000 milliLiter(s) (50 mL/Hr) IV Continuous <Continuous>  dextrose 50% Injectable 12.5 Gram(s) IV Push once  dextrose 50% Injectable 25 Gram(s) IV Push once  dextrose 50% Injectable 25 Gram(s) IV Push once  docusate sodium 100 milliGRAM(s) Oral two times a day  gabapentin 100 milliGRAM(s) Oral two times a day  heparin  Injectable 5000 Unit(s) SubCutaneous every 8 hours  hydrALAZINE 20 milliGRAM(s) Oral every 8 hours  influenza   Vaccine 0.5 milliLiter(s) IntraMuscular once  insulin glargine Injectable (LANTUS) 65 Unit(s) SubCutaneous at bedtime  insulin lispro (HumaLOG) corrective regimen sliding scale   SubCutaneous three times a day before meals  insulin lispro (HumaLOG) corrective regimen sliding scale   SubCutaneous at bedtime  insulin lispro Injectable (HumaLOG) 30 Unit(s) SubCutaneous three times a day before meals  isosorbide   dinitrate Tablet (ISORDIL) 10 milliGRAM(s) Oral three times a day  levoFLOXacin  Tablet 500 milliGRAM(s) Oral every 48 hours  levothyroxine 50 MICROGram(s) Oral daily  montelukast 10 milliGRAM(s) Oral daily  pantoprazole    Tablet 40 milliGRAM(s) Oral before breakfast  polyethylene glycol 3350 17 Gram(s) Oral two times a day  predniSONE   Tablet 20 milliGRAM(s) Oral daily  senna 2 Tablet(s) Oral at bedtime  sodium chloride 0.65% Nasal 1 Spray(s) Both Nostrils three times a day  ticagrelor 90 milliGRAM(s) Oral two times a day    MEDICATIONS  (PRN):  acetaminophen   Tablet .. 650 milliGRAM(s) Oral every 6 hours PRN Mild Pain (1 - 3), Moderate Pain (4 - 6), Severe Pain (7 - 10)  ALBUTerol/ipratropium for Nebulization. 3 milliLiter(s) Nebulizer once PRN Shortness of Breath  dextrose 40% Gel 15 Gram(s) Oral once PRN Blood Glucose LESS THAN 70 milliGRAM(s)/deciliter  diphenhydrAMINE 25 milliGRAM(s) Oral every 4 hours PRN Rash and/or Itching  glucagon  Injectable 1 milliGRAM(s) IntraMuscular once PRN Glucose LESS THAN 70 milligrams/deciliter  ondansetron Injectable 4 milliGRAM(s) IV Push once PRN Nausea and/or Vomiting      Allergies    azithromycin (Hives; Pruritus)    Intolerances        PERTINENT MEDICATION HISTORY:  · 	furosemide 40 mg oral tablet: 1 tab(s) orally every 12 hours  · 	aspirin 81 mg oral delayed release tablet: 1 tab(s) orally once a day    SOCIAL HISTORY:  ,   in [facility], She lives with daughter and son-in-law  Does not smoke nor drink    FAMILY HISTORY:  Family history of hypertension (Sibling): sister  Family history of diabetes mellitus (Sibling): brothers/sisters      Vital Signs Last 24 Hrs  T(C): 36.8 (01 May 2019 11:38), Max: 37 (2019 16:00)  T(F): 98.3 (01 May 2019 11:38), Max: 98.6 (2019 16:00)  HR: 101 (01 May 2019 14:08) (82 - 105)  BP: 113/46 (01 May 2019 14:08) (95/53 - 149/77)  BP(mean): 66 (01 May 2019 14:08) (65 - 99)  RR: 34 (01 May 2019 14:08) (12 - 34)  SpO2: 99% (01 May 2019 14:08) (93% - 100%)    Daily     Daily Weight in k.2 (01 May 2019 06:00)    Physical Exam:  General: NAD, obese  HEENT: no oral ulcers  CVS: +S1/S2, RRR  Resp: slight crackles  GI: Soft, NT/ND  MSK: L foot dorsum tender to palpation but no swelling, erythema. L shoulder no swelling. FROM. Negative for Hawkings kneers, empty can sign.   Neuro: AAOx3  muscle strength RUE LUE ; RLE LLE   Skin: no  rashes    LABS:                        9.0    13.11 )-----------( 365      ( 01 May 2019 04:00 )             29.3     05-01    137  |  99  |  73<H>  ----------------------------<  250<H>  4.2   |  20<L>  |  2.26<H>    Ca    9.3      01 May 2019 07:45  Phos  4.3     05-01  Mg     2.6     05-01    TPro  8.2  /  Alb  3.9  /  TBili  0.4  /  DBili  x   /  AST  38<H>  /  ALT  14  /  AlkPhos  61  04-29    Uric Acid, Serum (19 @ 04:30)    Uric Acid, Serum: 12.8 mg/dL          RADIOLOGY & ADDITIONAL STUDIES:    < from: Xray Foot AP + Lateral + Oblique, Bilat (19 @ 13:53) >  EXAM:  RAD FOOT MIN 3 VIEWS BI        PROCEDURE DATE:  2019         INTERPRETATION:  CLINICAL INDICATION: CAD; bilateral foot pain    EXAM:  Portable frontal oblique and lateral views of both feet from 2019 at   1353. Compared to prior study from 2018.    IMPRESSION:  No tracking soft tissue gas collections, radiopaque foreign bodies, or   gross radiographic evidence for osteomyelitis.    No fractures or dislocations.    Tarsometatarsal alignment maintained without evidence fora Lisfranc   injury.    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 Preserved remaining joint spaces   and no joint margin erosions.    Bilateral posterosuperior calcaneal Víctor deformities and tiny   bilateral plantar calcaneal enthesophytes.    Generalized osteopenia otherwise no discrete lytic or blastic lesions.    Scant vascular calcifications.                  BLU PATTERSON M.D., ATTENDING RADIOLOGIST  This document has been electronically signed. 2019  3:16PM                  < end of copied text >

## 2019-05-01 NOTE — PROGRESS NOTE ADULT - PROBLEM SELECTOR PLAN 1
JVP appears normal today.  D/C IV Bumex. Start Bumex 1 mg po qd.   Not on ACEI/ARB/ARNI due to MERVIN.   Continue hydral 20 mg po TID  Strict I/O and daily standing weights.   Not on BB currently, ok to keep holding.  Pt to get LV to evaluate MR JVP appears normal today.  D/C IV Bumex. Start Bumex 1 mg po qd.   Not on ACEI/ARB/ARNI due to MERVIN.   Continue hydral 20 mg po TID  Add isordil 10 mg po TID.   Strict I/O and daily standing weights.   Not on BB currently, ok to keep holding.  Pt to get LV to evaluate MR

## 2019-05-01 NOTE — PROGRESS NOTE ADULT - PROBLEM SELECTOR PLAN 6
- patient with CKD III, Baseline Scr 1.5-1.8  - presented with Scr 2.21, currently 2.26  - continue to diurese as tolerated   - daily BMPs. Daily weights    - nephro following   - avoid nephrotoxins

## 2019-05-01 NOTE — CONSULT NOTE ADULT - ASSESSMENT
74 y/o F w CKD, HF admitted for acute decompensated HF, being aggressively diuresed, presents w acute L dorsal foot pain  Pt does have high risk factors for gout, CKD, diuresis; high uric acid; however no synovitis/tenosynovitis on exam, which is odd  Pt does have point tenderness however  Improving w steroids    Recommend  As long as no contraindication from a HF perspective, pt can do following taper  Pred 20mg X total 3 days  Pred 15 X 3 days  Pred 10mg X 3 days  5mg X 3 days    No indication for uric acid lower therapy during gout flare. Pt can start this outpat.     Kavon Echeverria MD PGY4  [id number]

## 2019-05-01 NOTE — PROGRESS NOTE ADULT - ATTENDING COMMENTS
Briefly, 74 y/o female with h/o HTN, DM, CAD s/p CABG (w/ prior PCI to Ramus; LIMA-LAD patent), HFrEF (LVEF 25%, LVEDD 5.2 cm), likely mod-severe MR, severe pulm HTN comes in with cough and SOB. CT chest showed ground glass opacities, patient was started on IV zithromax, developed rash, now on Levaquin. She was admitted to telemetry was given a dose of lopressor, patient became hypotensive and went into respiratory distress, patient was then moved to CCU. HF was consulted to help manage this patient who is SOB and is hypotensive. Diuresed with improvement. Developed b/l feet pain (? gout), improving with prednisone. On exam, NAD, JVP approx 6-8 cm with minimal HJR, abdom nontender, no pedal edema, WWP. Labs reviewed. EKG reviewed. TTE - EF 25%, LVEDD 5.2 cm, mod MR (? underestimated; likely ischemic). Overall stage C HF, NYHA class IV w/ possible pulmonary edema 2/2 ischemic MR  - reduce bumex to 1 mg daily   - continue hydral 20 mg q8h (hold SBP <90)  - add isordil 10 mg q8h  - hold BB for now  - LV to assess MR severity  - would treat suspected gout with prednisone 20 mg daily x 3 days (unclear if she needs a taper)

## 2019-05-02 ENCOUNTER — TRANSCRIPTION ENCOUNTER (OUTPATIENT)
Age: 74
End: 2019-05-02

## 2019-05-02 LAB
ANION GAP SERPL CALC-SCNC: 18 MMO/L — HIGH (ref 7–14)
ANION GAP SERPL CALC-SCNC: 20 MMO/L — HIGH (ref 7–14)
BUN SERPL-MCNC: 83 MG/DL — HIGH (ref 7–23)
BUN SERPL-MCNC: 89 MG/DL — HIGH (ref 7–23)
CALCIUM SERPL-MCNC: 9.4 MG/DL — SIGNIFICANT CHANGE UP (ref 8.4–10.5)
CALCIUM SERPL-MCNC: 9.5 MG/DL — SIGNIFICANT CHANGE UP (ref 8.4–10.5)
CHLORIDE SERPL-SCNC: 102 MMOL/L — SIGNIFICANT CHANGE UP (ref 98–107)
CHLORIDE SERPL-SCNC: 104 MMOL/L — SIGNIFICANT CHANGE UP (ref 98–107)
CO2 SERPL-SCNC: 18 MMOL/L — LOW (ref 22–31)
CO2 SERPL-SCNC: 18 MMOL/L — LOW (ref 22–31)
CREAT ?TM UR-MCNC: 49.7 MG/DL — SIGNIFICANT CHANGE UP
CREAT SERPL-MCNC: 2.35 MG/DL — HIGH (ref 0.5–1.3)
CREAT SERPL-MCNC: 2.38 MG/DL — HIGH (ref 0.5–1.3)
GLUCOSE SERPL-MCNC: 218 MG/DL — HIGH (ref 70–99)
GLUCOSE SERPL-MCNC: 238 MG/DL — HIGH (ref 70–99)
HCT VFR BLD CALC: 28 % — LOW (ref 34.5–45)
HGB BLD-MCNC: 8.7 G/DL — LOW (ref 11.5–15.5)
MAGNESIUM SERPL-MCNC: 2.8 MG/DL — HIGH (ref 1.6–2.6)
MAGNESIUM SERPL-MCNC: 2.9 MG/DL — HIGH (ref 1.6–2.6)
MCHC RBC-ENTMCNC: 28.5 PG — SIGNIFICANT CHANGE UP (ref 27–34)
MCHC RBC-ENTMCNC: 31.1 % — LOW (ref 32–36)
MCV RBC AUTO: 91.8 FL — SIGNIFICANT CHANGE UP (ref 80–100)
NRBC # FLD: 0.02 K/UL — SIGNIFICANT CHANGE UP (ref 0–0)
OSMOLALITY UR: 422 MOSMO/KG — SIGNIFICANT CHANGE UP (ref 50–1200)
PHOSPHATE SERPL-MCNC: 4 MG/DL — SIGNIFICANT CHANGE UP (ref 2.5–4.5)
PHOSPHATE SERPL-MCNC: 4.1 MG/DL — SIGNIFICANT CHANGE UP (ref 2.5–4.5)
PLATELET # BLD AUTO: 367 K/UL — SIGNIFICANT CHANGE UP (ref 150–400)
PMV BLD: 9.1 FL — SIGNIFICANT CHANGE UP (ref 7–13)
POTASSIUM SERPL-MCNC: 4.2 MMOL/L — SIGNIFICANT CHANGE UP (ref 3.5–5.3)
POTASSIUM SERPL-MCNC: 5.1 MMOL/L — SIGNIFICANT CHANGE UP (ref 3.5–5.3)
POTASSIUM SERPL-SCNC: 4.2 MMOL/L — SIGNIFICANT CHANGE UP (ref 3.5–5.3)
POTASSIUM SERPL-SCNC: 5.1 MMOL/L — SIGNIFICANT CHANGE UP (ref 3.5–5.3)
RBC # BLD: 3.05 M/UL — LOW (ref 3.8–5.2)
RBC # FLD: 16.8 % — HIGH (ref 10.3–14.5)
SODIUM SERPL-SCNC: 138 MMOL/L — SIGNIFICANT CHANGE UP (ref 135–145)
SODIUM SERPL-SCNC: 142 MMOL/L — SIGNIFICANT CHANGE UP (ref 135–145)
SODIUM UR-SCNC: 46 MMOL/L — SIGNIFICANT CHANGE UP
UUN UR-MCNC: 770 MG/DL — SIGNIFICANT CHANGE UP
WBC # BLD: 12.82 K/UL — HIGH (ref 3.8–10.5)
WBC # FLD AUTO: 12.82 K/UL — HIGH (ref 3.8–10.5)

## 2019-05-02 PROCEDURE — 99233 SBSQ HOSP IP/OBS HIGH 50: CPT

## 2019-05-02 PROCEDURE — 71045 X-RAY EXAM CHEST 1 VIEW: CPT | Mod: 26

## 2019-05-02 RX ORDER — COLCHICINE 0.6 MG
1.2 TABLET ORAL ONCE
Qty: 0 | Refills: 0 | Status: COMPLETED | OUTPATIENT
Start: 2019-05-02 | End: 2019-05-02

## 2019-05-02 RX ORDER — HYDRALAZINE HCL 50 MG
25 TABLET ORAL THREE TIMES A DAY
Qty: 0 | Refills: 0 | Status: DISCONTINUED | OUTPATIENT
Start: 2019-05-02 | End: 2019-05-03

## 2019-05-02 RX ADMIN — Medication 100 MILLIGRAM(S): at 06:15

## 2019-05-02 RX ADMIN — TICAGRELOR 90 MILLIGRAM(S): 90 TABLET ORAL at 06:15

## 2019-05-02 RX ADMIN — Medication 30 UNIT(S): at 08:11

## 2019-05-02 RX ADMIN — Medication 650 MILLIGRAM(S): at 11:44

## 2019-05-02 RX ADMIN — ISOSORBIDE DINITRATE 10 MILLIGRAM(S): 5 TABLET ORAL at 14:18

## 2019-05-02 RX ADMIN — Medication 2: at 17:06

## 2019-05-02 RX ADMIN — Medication 3 MILLILITER(S): at 22:46

## 2019-05-02 RX ADMIN — Medication 25 MILLIGRAM(S): at 23:04

## 2019-05-02 RX ADMIN — HEPARIN SODIUM 5000 UNIT(S): 5000 INJECTION INTRAVENOUS; SUBCUTANEOUS at 06:15

## 2019-05-02 RX ADMIN — ISOSORBIDE DINITRATE 10 MILLIGRAM(S): 5 TABLET ORAL at 06:23

## 2019-05-02 RX ADMIN — MONTELUKAST 10 MILLIGRAM(S): 4 TABLET, CHEWABLE ORAL at 11:43

## 2019-05-02 RX ADMIN — Medication 2: at 08:11

## 2019-05-02 RX ADMIN — ISOSORBIDE DINITRATE 10 MILLIGRAM(S): 5 TABLET ORAL at 22:59

## 2019-05-02 RX ADMIN — Medication 3 MILLILITER(S): at 04:23

## 2019-05-02 RX ADMIN — PANTOPRAZOLE SODIUM 40 MILLIGRAM(S): 20 TABLET, DELAYED RELEASE ORAL at 06:15

## 2019-05-02 RX ADMIN — Medication 1: at 23:12

## 2019-05-02 RX ADMIN — HEPARIN SODIUM 5000 UNIT(S): 5000 INJECTION INTRAVENOUS; SUBCUTANEOUS at 14:18

## 2019-05-02 RX ADMIN — Medication 1 SPRAY(S): at 23:01

## 2019-05-02 RX ADMIN — Medication 1 SPRAY(S): at 14:18

## 2019-05-02 RX ADMIN — GABAPENTIN 100 MILLIGRAM(S): 400 CAPSULE ORAL at 06:14

## 2019-05-02 RX ADMIN — POLYETHYLENE GLYCOL 3350 17 GRAM(S): 17 POWDER, FOR SOLUTION ORAL at 17:06

## 2019-05-02 RX ADMIN — Medication 1: at 12:16

## 2019-05-02 RX ADMIN — GABAPENTIN 100 MILLIGRAM(S): 400 CAPSULE ORAL at 17:06

## 2019-05-02 RX ADMIN — Medication 3 MILLILITER(S): at 16:03

## 2019-05-02 RX ADMIN — Medication 30 UNIT(S): at 12:15

## 2019-05-02 RX ADMIN — Medication 100 MILLIGRAM(S): at 17:06

## 2019-05-02 RX ADMIN — CHLORHEXIDINE GLUCONATE 1 APPLICATION(S): 213 SOLUTION TOPICAL at 12:19

## 2019-05-02 RX ADMIN — Medication 1 SPRAY(S): at 06:23

## 2019-05-02 RX ADMIN — Medication 50 MICROGRAM(S): at 06:15

## 2019-05-02 RX ADMIN — Medication 20 MILLIGRAM(S): at 06:15

## 2019-05-02 RX ADMIN — Medication 1.2 MILLIGRAM(S): at 11:43

## 2019-05-02 RX ADMIN — HEPARIN SODIUM 5000 UNIT(S): 5000 INJECTION INTRAVENOUS; SUBCUTANEOUS at 23:00

## 2019-05-02 RX ADMIN — Medication 3 MILLILITER(S): at 10:02

## 2019-05-02 RX ADMIN — Medication 81 MILLIGRAM(S): at 11:43

## 2019-05-02 RX ADMIN — SENNA PLUS 2 TABLET(S): 8.6 TABLET ORAL at 22:58

## 2019-05-02 RX ADMIN — ATORVASTATIN CALCIUM 40 MILLIGRAM(S): 80 TABLET, FILM COATED ORAL at 22:58

## 2019-05-02 RX ADMIN — BUDESONIDE AND FORMOTEROL FUMARATE DIHYDRATE 2 PUFF(S): 160; 4.5 AEROSOL RESPIRATORY (INHALATION) at 10:02

## 2019-05-02 RX ADMIN — Medication 25 MILLIGRAM(S): at 14:18

## 2019-05-02 RX ADMIN — TICAGRELOR 90 MILLIGRAM(S): 90 TABLET ORAL at 17:06

## 2019-05-02 RX ADMIN — Medication 650 MILLIGRAM(S): at 11:08

## 2019-05-02 RX ADMIN — BUMETANIDE 1 MILLIGRAM(S): 0.25 INJECTION INTRAMUSCULAR; INTRAVENOUS at 06:23

## 2019-05-02 NOTE — PROGRESS NOTE ADULT - ATTENDING COMMENTS
Patient seen and examined  Agree with above assessment and plan  Patient severe MR/LV dysfunction  Will increase afterload reduction with hydralazine 25 mg TID  HF to discuss further procedural options for MV

## 2019-05-02 NOTE — PROGRESS NOTE ADULT - PROBLEM SELECTOR PLAN 2
4/27 chest pain is stable. on dual antiplatelet therapy. management per cardiology  4/28 per cardiology some point heart cath  4/29: per cardiology : she is on diuretics!  4/30: has severe MR for anjali in AM  5/2: ANJALI reviewed: has severe MR:

## 2019-05-02 NOTE — PROGRESS NOTE ADULT - ASSESSMENT
73YOF with hx of CAD s/p CABG, hypothyroidism, CKD, CHF, HTN, DM p/w worsening ZEE and sob.    A/p  MERVIN  Renal function uptrending  diuretics decreased to bumex 1mg po daily, clinically improving   monitor bmp  optimize glucose control  avoid nephrotoxic agents  **If pt planned for cardiac cath , pt is 26% risk of developing NGHIA and 1.09% risk of requiring RRT. In view of fluid overload status, no IVF prior to cath. Recommend to hold morning dose of lasix on the day of procedure    CKD stage 3  baseline cr 1.5-1.8  likely sec to HTN/DM/chf  elevated PTH, r/o vit d def check vit d 25  phos optimal    CHF  monitor daily weight and i/o  diuretics per cardio  f/u cardio

## 2019-05-02 NOTE — CHART NOTE - NSCHARTNOTEFT_GEN_A_CORE
Fellow Addendum:  73F with CAD s/p CABG, ICM/HFrEF (EF 20%, LVIDD 5.5cm, LA 3.8cm), HTN, HLD, DM, CKD a/w AHDF. LV performed 5/1 and demonstrates severe MR with posterior leaflet tethered on improved afterload reduction regimen.     Impression:  1. Acute on Chronic Systolic HF: improving, neg 1.4L on Bumex 2mg IV  2. Gout: improving on prednisone (day 3)  3. CAD s/p CABG: on asa/plavix  4. MERVIN on CKD: creatinine slightly up 2.3 (baseline 2.0)  5. Severe MR: LV c/w ischemic MR  6. 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  Cardiology Fellow Fellow Addendum:  73F with CAD s/p CABG, ICM/HFrEF (EF 20%, LVIDD 5.5cm, LA 3.8cm), HTN, HLD, DM, CKD a/w AHDF. Significant clinical improvement with IV diuresis and afterload reduction. LV performed 5/1 and demonstrates severe MR with posterior leaflet tethered on improved afterload reduction regimen.     Impression:  1. Acute on Chronic Systolic HF: improving, neg 1.4L on Bumex 2mg IV  2. Gout: improving on prednisone (day 3)  3. CAD s/p CABG: on asa/plavix  4. MERVIN on CKD: creatinine slightly up 2.3 (baseline 2.0)  5. Severe MR: LV c/w ischemic MR  6. 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  Cardiology Fellow

## 2019-05-02 NOTE — PROGRESS NOTE ADULT - SUBJECTIVE AND OBJECTIVE BOX
Patient is a 73y old  Female who presents with a chief complaint of sob (02 May 2019 09:38)      Any change in ROS: He has been doing ok : no SOB : no cough !    MEDICATIONS  (STANDING):  ALBUTerol/ipratropium for Nebulization 3 milliLiter(s) Nebulizer every 6 hours  aspirin enteric coated 81 milliGRAM(s) Oral daily  atorvastatin 40 milliGRAM(s) Oral at bedtime  buDESOnide 160 MICROgram(s)/formoterol 4.5 MICROgram(s) Inhaler 2 Puff(s) Inhalation two times a day  buMETAnide 1 milliGRAM(s) Oral daily  chlorhexidine 4% Liquid 1 Application(s) Topical <User Schedule>  dextrose 5%. 1000 milliLiter(s) (50 mL/Hr) IV Continuous <Continuous>  dextrose 50% Injectable 12.5 Gram(s) IV Push once  dextrose 50% Injectable 25 Gram(s) IV Push once  dextrose 50% Injectable 25 Gram(s) IV Push once  docusate sodium 100 milliGRAM(s) Oral two times a day  gabapentin 100 milliGRAM(s) Oral two times a day  heparin  Injectable 5000 Unit(s) SubCutaneous every 8 hours  hydrALAZINE 20 milliGRAM(s) Oral every 8 hours  influenza   Vaccine 0.5 milliLiter(s) IntraMuscular once  insulin glargine Injectable (LANTUS) 65 Unit(s) SubCutaneous at bedtime  insulin lispro (HumaLOG) corrective regimen sliding scale   SubCutaneous three times a day before meals  insulin lispro (HumaLOG) corrective regimen sliding scale   SubCutaneous at bedtime  insulin lispro Injectable (HumaLOG) 30 Unit(s) SubCutaneous three times a day before meals  isosorbide   dinitrate Tablet (ISORDIL) 10 milliGRAM(s) Oral three times a day  levoFLOXacin  Tablet 500 milliGRAM(s) Oral every 48 hours  levothyroxine 50 MICROGram(s) Oral daily  montelukast 10 milliGRAM(s) Oral daily  pantoprazole    Tablet 40 milliGRAM(s) Oral before breakfast  polyethylene glycol 3350 17 Gram(s) Oral two times a day  predniSONE   Tablet 20 milliGRAM(s) Oral daily  senna 2 Tablet(s) Oral at bedtime  sodium chloride 0.65% Nasal 1 Spray(s) Both Nostrils three times a day  ticagrelor 90 milliGRAM(s) Oral two times a day    MEDICATIONS  (PRN):  acetaminophen   Tablet .. 650 milliGRAM(s) Oral every 6 hours PRN Mild Pain (1 - 3), Moderate Pain (4 - 6), Severe Pain (7 - 10)  ALBUTerol/ipratropium for Nebulization. 3 milliLiter(s) Nebulizer once PRN Shortness of Breath  dextrose 40% Gel 15 Gram(s) Oral once PRN Blood Glucose LESS THAN 70 milliGRAM(s)/deciliter  diphenhydrAMINE 25 milliGRAM(s) Oral every 4 hours PRN Rash and/or Itching  glucagon  Injectable 1 milliGRAM(s) IntraMuscular once PRN Glucose LESS THAN 70 milligrams/deciliter  ondansetron Injectable 4 milliGRAM(s) IV Push once PRN Nausea and/or Vomiting    Vital Signs Last 24 Hrs  T(C): 36.8 (02 May 2019 08:00), Max: 36.8 (01 May 2019 11:38)  T(F): 98.2 (02 May 2019 08:00), Max: 98.3 (01 May 2019 11:38)  HR: 90 (02 May 2019 10:03) (86 - 108)  BP: 111/54 (02 May 2019 10:00) (91/63 - 124/57)  BP(mean): 72 (02 May 2019 10:00) (65 - 77)  RR: 18 (02 May 2019 10:00) (12 - 34)  SpO2: 100% (02 May 2019 10:00) (95% - 100%)    I&O's Summary    01 May 2019 07:01  -  02 May 2019 07:00  --------------------------------------------------------  IN: 240 mL / OUT: 1675 mL / NET: -1435 mL    02 May 2019 07:01  -  02 May 2019 10:59  --------------------------------------------------------  IN: 120 mL / OUT: 250 mL / NET: -130 mL          Physical Exam:   GENERAL: NAD, well-groomed, well-developed  HEENT: MOHSEN/   Atraumatic, Normocephalic  ENMT: No tonsillar erythema, exudates, or enlargement; Moist mucous membranes, Good dentition, No lesions  NECK: Supple, No JVD, Normal thyroid  CHEST/LUNG: Bibasilar cackles   CVS: Regular rate and rhythm; No murmurs, rubs, or gallops  GI: : Soft, Nontender, Nondistended; Bowel sounds present  NERVOUS SYSTEM:  Alert & Oriented X3  EXTREMITIES:  not much of  edema  LYMPH: No lymphadenopathy noted  SKIN: No rashes or lesions  ENDOCRINOLOGY: No Thyromegaly  PSYCH: Appropriate    Labs:                              8.7    12.82 )-----------( 367      ( 02 May 2019 05:15 )             28.0                         9.0    13.11 )-----------( 365      ( 01 May 2019 04:00 )             29.3                         9.1    11.34 )-----------( 307      ( 30 Apr 2019 05:47 )             29.8                         9.5    12.59 )-----------( 293      ( 29 Apr 2019 04:30 )             32.1     05-02    142  |  104  |  83<H>  ----------------------------<  218<H>  4.2   |  18<L>  |  2.35<H>  05-01    137  |  99  |  73<H>  ----------------------------<  250<H>  4.2   |  20<L>  |  2.26<H>  05-01    138  |  100  |  74<H>  ----------------------------<  236<H>  5.6<H>   |  19<L>  |  2.13<H>  04-30    140  |  100  |  67<H>  ----------------------------<  133<H>  4.7   |  22  |  2.19<H>  04-30    139  |  101  |  60<H>  ----------------------------<  174<H>  4.2   |  21<L>  |  2.08<H>  04-29    140  |  100  |  60<H>  ----------------------------<  158<H>  5.5<H>   |  21<L>  |  2.06<H>  04-29    136  |  99  |  60<H>  ----------------------------<  203<H>  5.6<H>   |  22  |  2.09<H>  04-29    139  |  104  |  55<H>  ----------------------------<  113<H>  4.6   |  19<L>  |  2.03<H>  04-28    145  |  107  |  57<H>  ----------------------------<  150<H>  4.3   |  19<L>  |  2.02<H>    Ca    9.5      02 May 2019 05:15  Ca    9.3      01 May 2019 07:45  Ca    9.4      01 May 2019 04:00  Ca    9.8      30 Apr 2019 19:45  Phos  4.1     05-02  Phos  4.3     05-01  Phos  4.3     05-01  Phos  4.1     04-30  Mg     2.9     05-02  Mg     2.6     05-01  Mg     2.6     05-01  Mg     2.7     04-30    TPro  8.2  /  Alb  3.9  /  TBili  0.4  /  DBili  x   /  AST  38<H>  /  ALT  14  /  AlkPhos  61  04-29  TPro  7.5  /  Alb  3.6  /  TBili  0.4  /  DBili  x   /  AST  17  /  ALT  12  /  AlkPhos  60  04-29    CAPILLARY BLOOD GLUCOSE      POCT Blood Glucose.: 231 mg/dL (02 May 2019 07:58)  POCT Blood Glucose.: 137 mg/dL (01 May 2019 22:37)  POCT Blood Glucose.: 338 mg/dL (01 May 2019 16:42)  POCT Blood Glucose.: 258 mg/dL (01 May 2019 11:55)    < from: Transesophageal Echocardiogram w/o TTE (05.01.19 @ 18:25) >  CONCLUSIONS:  1. Mitral annular calcification and calcified mitral  leaflets which are tethered. The posterior mitral leaflet  is particularly tethered.  Severe mitral regurgitation  which originates along the coaptation line.  2. Calcified trileaflet aortic valve with normal opening.  3. Left atrial enlargement. Left atrial appendage could not  be visualized, unclear if it was ligated during prior CABG.    4. Left ventricular enlargement.  5. Severe  left ventricular systolic dysfunction.  6. Right ventricular enlargement with decreased right  ventricular systolic function.  7. Agitated saline injection demonstrates evidence of a  patent foramen ovale with left to right flow.  ------------------------------------------------------------------------  Confirmed on  5/1/2019 - 19:10:27 by Bushra Werner M.D. [facility]  ------------------------------------------------------------------------    < end of copied text >                RECENT CULTURES:        RESPIRATORY CULTURES:          Studies  Chest X-RAY  CT SCAN Chest   Venous Dopplers: LE:   CT Abdomen  Others

## 2019-05-02 NOTE — PROGRESS NOTE ADULT - SUBJECTIVE AND OBJECTIVE BOX
Infectious Diseases progress note:    Subjective:  Afebrile.  No resp distress, mild sob.  No cough.  Pt on prednisone for trt of gout.  s/p LV    ROS:  CONSTITUTIONAL:  No fever, chills, rigors  CARDIOVASCULAR:  No chest pain or palpitations  RESPIRATORY:   No SOB, cough, dyspnea on exertion.  No wheezing  GASTROINTESTINAL:  No abd pain, N/V, diarrhea/constipation  EXTREMITIES:  No swelling or joint pain  GENITOURINARY:  No burning on urination, increased frequency or urgency.  No flank pain  NEUROLOGIC:  No HA, visual disturbances  SKIN: No rashes    Allergies    azithromycin (Hives; Pruritus)    Intolerances        ANTIBIOTICS/RELEVANT:  antimicrobials  levoFLOXacin  Tablet 500 milliGRAM(s) Oral every 48 hours    immunologic:  influenza   Vaccine 0.5 milliLiter(s) IntraMuscular once    OTHER:  acetaminophen   Tablet .. 650 milliGRAM(s) Oral every 6 hours PRN  ALBUTerol/ipratropium for Nebulization 3 milliLiter(s) Nebulizer every 6 hours  ALBUTerol/ipratropium for Nebulization. 3 milliLiter(s) Nebulizer once PRN  aspirin enteric coated 81 milliGRAM(s) Oral daily  atorvastatin 40 milliGRAM(s) Oral at bedtime  buDESOnide 160 MICROgram(s)/formoterol 4.5 MICROgram(s) Inhaler 2 Puff(s) Inhalation two times a day  buMETAnide 1 milliGRAM(s) Oral daily  chlorhexidine 4% Liquid 1 Application(s) Topical <User Schedule>  dextrose 40% Gel 15 Gram(s) Oral once PRN  dextrose 5%. 1000 milliLiter(s) IV Continuous <Continuous>  dextrose 50% Injectable 12.5 Gram(s) IV Push once  dextrose 50% Injectable 25 Gram(s) IV Push once  dextrose 50% Injectable 25 Gram(s) IV Push once  diphenhydrAMINE 25 milliGRAM(s) Oral every 4 hours PRN  docusate sodium 100 milliGRAM(s) Oral two times a day  gabapentin 100 milliGRAM(s) Oral two times a day  glucagon  Injectable 1 milliGRAM(s) IntraMuscular once PRN  heparin  Injectable 5000 Unit(s) SubCutaneous every 8 hours  hydrALAZINE 25 milliGRAM(s) Oral three times a day  insulin glargine Injectable (LANTUS) 65 Unit(s) SubCutaneous at bedtime  insulin lispro (HumaLOG) corrective regimen sliding scale   SubCutaneous three times a day before meals  insulin lispro (HumaLOG) corrective regimen sliding scale   SubCutaneous at bedtime  insulin lispro Injectable (HumaLOG) 30 Unit(s) SubCutaneous three times a day before meals  isosorbide   dinitrate Tablet (ISORDIL) 10 milliGRAM(s) Oral three times a day  levothyroxine 50 MICROGram(s) Oral daily  montelukast 10 milliGRAM(s) Oral daily  ondansetron Injectable 4 milliGRAM(s) IV Push once PRN  pantoprazole    Tablet 40 milliGRAM(s) Oral before breakfast  polyethylene glycol 3350 17 Gram(s) Oral two times a day  senna 2 Tablet(s) Oral at bedtime  sodium chloride 0.65% Nasal 1 Spray(s) Both Nostrils three times a day  ticagrelor 90 milliGRAM(s) Oral two times a day      Objective:  Vital Signs Last 24 Hrs  T(C): 36.6 (02 May 2019 23:00), Max: 36.8 (02 May 2019 08:00)  T(F): 97.9 (02 May 2019 23:00), Max: 98.2 (02 May 2019 08:00)  HR: 99 (02 May 2019 23:00) (78 - 102)  BP: 114/61 (02 May 2019 23:00) (91/63 - 115/56)  BP(mean): 66 (02 May 2019 17:00) (65 - 76)  RR: 18 (02 May 2019 23:00) (14 - 27)  SpO2: 100% (02 May 2019 23:00) (95% - 100%)    PHYSICAL EXAM:  Constitutional:NAD  Eyes:MOHSEN, EOMI  Ear/Nose/Throat: no thrush, mucositis.  Moist mucous membranes	  Neck:no JVD, no lymphadenopathy, supple  Respiratory: CTA maribel  Cardiovascular: S1S2 RRR, no murmurs  Gastrointestinal:soft, nontender,  nondistended (+) BS  Extremities:no e/e/c  Skin:  no rashes, open wounds or ulcerations        LABS:                        8.7    12.82 )-----------( 367      ( 02 May 2019 05:15 )             28.0     05-02    138  |  102  |  89<H>  ----------------------------<  238<H>  5.1   |  18<L>  |  2.38<H>    Ca    9.4      02 May 2019 17:53  Phos  4.0     05-02  Mg     2.8     05-02            Procalcitonin, Serum: 0.13 (04-26 @ 11:36)                    MICROBIOLOGY:          RADIOLOGY & ADDITIONAL STUDIES:    < from: Xray Chest 1 View- PORTABLE-Urgent (05.02.19 @ 10:47) >  FINDINGS:    Lines/Tubes: None.    Heartand mediastinum:  Median sternotomy wires, mediastinal surgical   clips, and coronary stent.    Lungs, pleura, and airways: No focal pulmonary consolidation. No pleural   effusion or pneumothorax.    Bones and soft tissues: The bones and soft tissuesare unchanged.    Impression:    Clear lungs.    < end of copied text >

## 2019-05-02 NOTE — PROGRESS NOTE ADULT - SUBJECTIVE AND OBJECTIVE BOX
Interval History:  still reports pain in feet b/l  denies SOB  LV reviewed    Medications:  acetaminophen   Tablet .. 650 milliGRAM(s) Oral every 6 hours PRN  ALBUTerol/ipratropium for Nebulization 3 milliLiter(s) Nebulizer every 6 hours  ALBUTerol/ipratropium for Nebulization. 3 milliLiter(s) Nebulizer once PRN  aspirin enteric coated 81 milliGRAM(s) Oral daily  atorvastatin 40 milliGRAM(s) Oral at bedtime  buDESOnide 160 MICROgram(s)/formoterol 4.5 MICROgram(s) Inhaler 2 Puff(s) Inhalation two times a day  buMETAnide 1 milliGRAM(s) Oral daily  chlorhexidine 4% Liquid 1 Application(s) Topical <User Schedule>  dextrose 40% Gel 15 Gram(s) Oral once PRN  dextrose 5%. 1000 milliLiter(s) IV Continuous <Continuous>  dextrose 50% Injectable 12.5 Gram(s) IV Push once  dextrose 50% Injectable 25 Gram(s) IV Push once  dextrose 50% Injectable 25 Gram(s) IV Push once  diphenhydrAMINE 25 milliGRAM(s) Oral every 4 hours PRN  docusate sodium 100 milliGRAM(s) Oral two times a day  gabapentin 100 milliGRAM(s) Oral two times a day  glucagon  Injectable 1 milliGRAM(s) IntraMuscular once PRN  heparin  Injectable 5000 Unit(s) SubCutaneous every 8 hours  hydrALAZINE 25 milliGRAM(s) Oral three times a day  influenza   Vaccine 0.5 milliLiter(s) IntraMuscular once  insulin glargine Injectable (LANTUS) 65 Unit(s) SubCutaneous at bedtime  insulin lispro (HumaLOG) corrective regimen sliding scale   SubCutaneous three times a day before meals  insulin lispro (HumaLOG) corrective regimen sliding scale   SubCutaneous at bedtime  insulin lispro Injectable (HumaLOG) 30 Unit(s) SubCutaneous three times a day before meals  isosorbide   dinitrate Tablet (ISORDIL) 10 milliGRAM(s) Oral three times a day  levoFLOXacin  Tablet 500 milliGRAM(s) Oral every 48 hours  levothyroxine 50 MICROGram(s) Oral daily  montelukast 10 milliGRAM(s) Oral daily  ondansetron Injectable 4 milliGRAM(s) IV Push once PRN  pantoprazole    Tablet 40 milliGRAM(s) Oral before breakfast  polyethylene glycol 3350 17 Gram(s) Oral two times a day  senna 2 Tablet(s) Oral at bedtime  sodium chloride 0.65% Nasal 1 Spray(s) Both Nostrils three times a day  ticagrelor 90 milliGRAM(s) Oral two times a day      Vitals:  T(C): 36.6 (19 @ 20:59), Max: 36.8 (19 @ 08:00)  HR: 89 (19 @ 20:59) (85 - 104)  BP: 111/62 (19 @ 20:59) (91/63 - 115/56)  BP(mean): 66 (19 @ 17:00) (65 - 76)  RR: 18 (19 @ 20:59) (14 - 27)  SpO2: 100% (19 @ 20:59) (95% - 100%)    Daily     Daily Weight in k.9 (02 May 2019 06:00)        I&O's Summary    01 May 2019 07:  -  02 May 2019 07:00  --------------------------------------------------------  IN: 240 mL / OUT: 1675 mL / NET: -1435 mL    02 May 2019 07:01  -  02 May 2019 21:30  --------------------------------------------------------  IN: 340 mL / OUT: 561 mL / NET: -221 mL    Physical Exam:  Appearance: No Acute Distress  HEENT: PERRL  Neck: JVP approx 6-8 cm with minimal HJR  Cardiovascular: Normal S1 S2, No murmurs/rubs/gallops  Respiratory: Clear to auscultation bilaterally  Gastrointestinal: Soft, Non-tender	  Skin: No cyanosis	  Neurologic: Non-focal  Extremities: No LE edema; TTP L foot  Psychiatry: A & O x 3, Mood & affect appropriate    Labs:                        8.7    12.82 )-----------( 367      ( 02 May 2019 05:15 )             28.0     05-02    138  |  102  |  89<H>  ----------------------------<  238<H>  5.1   |  18<L>  |  2.38<H>    Ca    9.4      02 May 2019 17:53  Phos  4.0       Mg     2.8

## 2019-05-02 NOTE — DISCHARGE NOTE PROVIDER - CARE PROVIDER_API CALL
Lorena Villar (NP)  NP in Gerontology  [id number] [place] [address]  Phone: [phone]  Fax: [phone]  Follow Up Time:     Jillian Banks)  EndocrinologyMetabDiabetes; Internal Medicine  [address] [place] [address]  Phone: [phone]  Fax: [phone]  Follow Up Time:     Dr. Mohsin Zaman,   Phone: (   )    -  Fax: (   )    -  Follow Up Time:     Bonilla Hay)  Adv Heart Fail Trnsplnt Cardio  [address] [place] [address]  Phone: [phone]  Fax: [phone]  Follow Up Time:

## 2019-05-02 NOTE — PROGRESS NOTE ADULT - SUBJECTIVE AND OBJECTIVE BOX
INTERVAL HPI/OVERNIGHT EVENTS: patient received LV overnight     SUBJECTIVE: Patient seen and examined at bedside. Patient Patient denies chest pain/shortness of breath/abdominal pain/nausea/vomiting/diarrhea.     ROS: otherwise negative    OBJECTIVE:    VITAL SIGNS:  ICU Vital Signs Last 24 Hrs  T(C): 36.8 (02 May 2019 08:00), Max: 36.8 (01 May 2019 11:38)  T(F): 98.2 (02 May 2019 08:00), Max: 98.3 (01 May 2019 11:38)  HR: 90 (02 May 2019 10:03) (86 - 108)  BP: 111/54 (02 May 2019 10:00) (91/63 - 124/57)  BP(mean): 72 (02 May 2019 10:00) (65 - 77)  RR: 18 (02 May 2019 10:00) (14 - 34)  SpO2: 100% (02 May 2019 10:00) (95% - 100%)    05-01 @ 07:01 - 05-02 @ 07:00  --------------------------------------------------------  IN: 240 mL / OUT: 1675 mL / NET: -1435 mL    05-02 @ 07:01  -  05-02 @ 11:10  --------------------------------------------------------  IN: 240 mL / OUT: 330 mL / NET: -90 mL    CAPILLARY BLOOD GLUCOSE    POCT Blood Glucose.: 231 mg/dL (02 May 2019 07:58)    PHYSICAL EXAM:  General: elderly female, appearing stated age. Breathing well    HEENT: normocephalic, atraumatic. Moist mucous membranes   Eyes: clear conjunctiva, PERRL  Neck: supple. JVD to mid neck    Respiratory: Vertical scar with keloid formation. Bibasilar crackles. Breathing well on room air   Cardiovascular: S1, S2. Regular rate and rhythm. No murmurs/rubs/gallops  Abdomen: soft, nondistended, nontender. +BS  Extremities: WWP, 2+ peripheral pulses b/l; no LE edema. Left  to palpation   Skin: normal color and turgor; no rash  Neurological: awake and alert     MEDICATIONS:  MEDICATIONS  (STANDING):  ALBUTerol/ipratropium for Nebulization 3 milliLiter(s) Nebulizer every 6 hours  aspirin enteric coated 81 milliGRAM(s) Oral daily  atorvastatin 40 milliGRAM(s) Oral at bedtime  buDESOnide 160 MICROgram(s)/formoterol 4.5 MICROgram(s) Inhaler 2 Puff(s) Inhalation two times a day  buMETAnide 1 milliGRAM(s) Oral daily  chlorhexidine 4% Liquid 1 Application(s) Topical <User Schedule>  dextrose 5%. 1000 milliLiter(s) (50 mL/Hr) IV Continuous <Continuous>  dextrose 50% Injectable 12.5 Gram(s) IV Push once  dextrose 50% Injectable 25 Gram(s) IV Push once  dextrose 50% Injectable 25 Gram(s) IV Push once  docusate sodium 100 milliGRAM(s) Oral two times a day  gabapentin 100 milliGRAM(s) Oral two times a day  heparin  Injectable 5000 Unit(s) SubCutaneous every 8 hours  hydrALAZINE 20 milliGRAM(s) Oral every 8 hours  influenza   Vaccine 0.5 milliLiter(s) IntraMuscular once  insulin glargine Injectable (LANTUS) 65 Unit(s) SubCutaneous at bedtime  insulin lispro (HumaLOG) corrective regimen sliding scale   SubCutaneous three times a day before meals  insulin lispro (HumaLOG) corrective regimen sliding scale   SubCutaneous at bedtime  insulin lispro Injectable (HumaLOG) 30 Unit(s) SubCutaneous three times a day before meals  isosorbide   dinitrate Tablet (ISORDIL) 10 milliGRAM(s) Oral three times a day  levoFLOXacin  Tablet 500 milliGRAM(s) Oral every 48 hours  levothyroxine 50 MICROGram(s) Oral daily  montelukast 10 milliGRAM(s) Oral daily  pantoprazole    Tablet 40 milliGRAM(s) Oral before breakfast  polyethylene glycol 3350 17 Gram(s) Oral two times a day  predniSONE   Tablet 20 milliGRAM(s) Oral daily  senna 2 Tablet(s) Oral at bedtime  sodium chloride 0.65% Nasal 1 Spray(s) Both Nostrils three times a day  ticagrelor 90 milliGRAM(s) Oral two times a day    MEDICATIONS  (PRN):  acetaminophen   Tablet .. 650 milliGRAM(s) Oral every 6 hours PRN Mild Pain (1 - 3), Moderate Pain (4 - 6), Severe Pain (7 - 10)  ALBUTerol/ipratropium for Nebulization. 3 milliLiter(s) Nebulizer once PRN Shortness of Breath  dextrose 40% Gel 15 Gram(s) Oral once PRN Blood Glucose LESS THAN 70 milliGRAM(s)/deciliter  diphenhydrAMINE 25 milliGRAM(s) Oral every 4 hours PRN Rash and/or Itching  glucagon  Injectable 1 milliGRAM(s) IntraMuscular once PRN Glucose LESS THAN 70 milligrams/deciliter  ondansetron Injectable 4 milliGRAM(s) IV Push once PRN Nausea and/or Vomiting    ALLERGIES:  Allergies    azithromycin (Hives; Pruritus)    Intolerances    LABS:                        8.7    12.82 )-----------( 367      ( 02 May 2019 05:15 )             28.0     05-02    142  |  104  |  83<H>  ----------------------------<  218<H>  4.2   |  18<L>  |  2.35<H>    Ca    9.5      02 May 2019 05:15  Phos  4.1     05-02  Mg     2.9     05-02    RADIOLOGY & ADDITIONAL TESTS: Reviewed.

## 2019-05-02 NOTE — PROGRESS NOTE ADULT - ASSESSMENT
73YOF w/o h/o CAD, s/p CABG CHF adm to [facility] for Acute on Chronic systolic and diastolic HF

## 2019-05-02 NOTE — DISCHARGE NOTE PROVIDER - HOSPITAL COURSE
72 y/o female with hx of CAD s/p CABG, hypothyroidism, CKD, CHF (EF 37%, HTN, DM p/w worsening ZEE and sob. Admitted with CHF exacerbation.     CT chest showed ground glass opacities, patient was started on IV zithromax, developed rash, now on Levaquin She was admitted to telemetry was given a dose of lopressor, patient became hypotensive and went into respiratory distress, patient was transferred to CCU for management of acute decompensated HF.    Patient was started on afterload reducers, hydralazine and isordil. Patient was diuresed with lasix.     Repeat echo showed severe MR (tethered) with severe pulm HTN, decreased RV function, moderate to severe TR 74 y/o female with hx of CAD s/p CABG, hypothyroidism, CKD, CHF (EF 37%, HTN, DM p/w worsening ZEE and sob. Admitted with CHF exacerbation.     CT chest showed ground glass opacities, patient was started on IV zithromax, developed rash, switched to levaquin (patient finished a 7 day course for CAP). She was admitted to telemetry was given a dose of lopressor, patient became hypotensive and went into respiratory distress, patient was transferred to CCU for management of acute decompensated HF. Patient was started on afterload reducers, hydralazine and isordil, and slowly uptitrated. Patient was diuresed with lasix and intermittently with bumex.    Repeat echo showed severe MR (tethered) with severe pulm HTN, decreased RV function, moderate to severe TR. Patient underwent LV which showed severe MR, patient to be ebaluted for further intervention.    Patient's course was complicated by numerous joint pains, foot xrays with no acute pathology. patient found to have elevated uric acid levels, consistent with gout, rheumatology consulted and recommended prednisone taper and colchine dose. 74 y/o female with hx of CAD s/p CABG, hypothyroidism, CKD, CHF (EF 37%, HTN, DM p/w worsening ZEE and sob. Admitted with CHF exacerbation.     CT chest showed ground glass opacities, patient was started on IV zithromax, developed rash, switched to levaquin (patient finished a 7 day course for CAP). She was admitted to telemetry was given a dose of lopressor, patient became hypotensive and went into respiratory distress, patient was transferred to CCU for management of acute decompensated HF. Patient was started on afterload reducers, hydralazine and isordil, and slowly uptitrated. Patient was diuresed with lasix and intermittently with bumex. However, on 5/9 pt had acute pulmonary edema requiring re-escalation to CCU for diuresis w/ bumex gtt.    Repeat echo showed severe MR (tethered) with severe pulm HTN, decreased RV function, moderate to severe TR. Patient underwent LV which showed severe MR, patient to be ebaluted for further intervention.    Patient's course was complicated by numerous joint pains, foot xrays with no acute pathology. patient found to have elevated uric acid levels, consistent with gout, rheumatology consulted and recommended prednisone taper and colchine dose. 74 y/o female with hx of CAD s/p CABG, hypothyroidism, CKD, CHF (EF 37%, HTN, DM p/w worsening ZEE and sob. Admitted with CHF exacerbation.     CT chest showed ground glass opacities, patient was started on IV zithromax, developed rash, switched to levaquin (patient finished a 7 day course for CAP). She was admitted to telemetry was given a dose of lopressor, patient became hypotensive and went into respiratory distress, patient was transferred to CCU for management of acute decompensated HF. Patient was started on afterload reducers, hydralazine and isordil, and slowly uptitrated. Patient was diuresed with lasix and intermittently with bumex. However, on 5/9 pt had acute pulmonary edema requiring re-escalation to CCU for diuresis w/ bumex gtt. Pt Now improved on Po Bumex    Repeat echo showed severe MR (tethered) with severe pulm HTN, decreased RV function, moderate to severe TR. Patient underwent LV which showed severe MR, patient to be ebaluted for further intervention.    Patient's course was complicated by numerous joint pains, foot xrays with no acute pathology. patient found to have elevated uric acid levels, consistent with gout, rheumatology consulted and recommended prednisone taper and colchine dose.     Pt's symptoms improved with steroids        - 1) Acute on Chronic Systolic CHF Exacerbation, and Severe Mitral Valve Regurgitation:        - c/w diuresis and cards meds        -severe MVR - pt/family agreeable to mitral valve clip - outpt referal        - c/w     2) CAP s/p 7 day Course of antibiotics        -3) Major Depressive Disorder vs Mood Disorder vs Alternative:         - psychosocial support         - Psych Consult and f/u recs/management         -4) Acute Gout:        - steroids.         5) Symptomatic Supraventricular Tachycardia:  improving             6) Symptomatic Anemia 2/2 gastrointestinal bleed:        - relatively stable h/h        - slow bleed, conservative management for now        - monitor h/h, maintain hgb > 7         - 7) NSTEMI: Medical Management        -        - 8) MERVIN on CKD        - stable renal function.  trend renal function.        - optimize comorbidities. Avoid nephrotoxic rx         -9) DM II with long term complications of hyperglycemia, hypoglcyemia, and nephropathy   - c/w dm rx, as above    - monitor FS closely given hypoglycemia         - complicated by poor nutritional compliance (pt fed food from outside the hospital)         - c/w dm rx         Case discussed with attending, Pt Stable for discharge to Rehab

## 2019-05-02 NOTE — PROGRESS NOTE ADULT - ASSESSMENT
72 YO F w/o h/o CAD, s/p CABG Systolic CHF, CKD 3b, who p/w sob and pino.    - Acute on Chronic Systolic CHF Exacerbation, and Severe Mitral Valve Regurgitation:      - severe systolic dysfunction with severe MVR      - consider further evaluation for Mitral Valve repair/replacement      - c/w cardiac meds, (-) endocarditis       - cardio/CCU management greatly appreciated       - tele     - NSTEMI:      - Continue asa/brilinta/statin      - adjust management prn      - tele    -  Acute Gout:      - prednisone      - MERVIN on CKD      - stable. trend renal function.      - optimize comorbidities. Avoid nephrotoxic rx     - DM II 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

## 2019-05-02 NOTE — PROGRESS NOTE ADULT - ASSESSMENT
Briefly, 74 y/o female with h/o HTN, DM, CAD s/p CABG (w/ prior PCI to Ramus; LIMA-LAD patent), HFrEF (LVEF 25%, LVEDD 5.2 cm), likely mod-severe MR, severe pulm HTN comes in with cough and SOB. CT chest showed ground glass opacities, patient was started on IV zithromax, developed rash, now on Levaquin. She was admitted to telemetry was given a dose of lopressor, patient became hypotensive and went into respiratory distress, patient was then moved to CCU. HF was consulted to help manage this patient who is SOB and is hypotensive. Diuresed with improvement. Developed b/l feet pain (likely gout), improving with prednisone. On exam, NAD, JVP approx 6-8 cm with minimal HJR, abdom nontender, no pedal edema, WWP. Labs reviewed. EKG reviewed. TTE - EF 25%, LVEDD 5.2 cm, severe MR (likely ischemic). LV c/w severe ischemic MR 2/2 posterior leaflet tethering. Overall stage C HF, NYHA class IV w/ possible pulmonary edema 2/2 ischemic MR  - reduce bumex to 1 mg daily; may need to hold if Cr rises further  - increase hydral 25 mg q8h (hold SBP <90)  - continue isordil 10 mg q8h  - will consider BB tomorrow  - pt may be a candidate for mitral clip however frail and has poor renal function which may be prohibitive for mitral clip  - would treat suspected gout with prednisone 20 mg daily x 3 days; given no improvement, colchicine 1.2 mg x1 today No

## 2019-05-02 NOTE — DISCHARGE NOTE PROVIDER - PROVIDER TOKENS
PROVIDER:[TOKEN:[[id number]]],PROVIDER:[TOKEN:[[id number]]],FREE:[LAST:[Dr. Mohsin Zaman],PHONE:[(   )    -],FAX:[(   )    -]],PROVIDER:[TOKEN:[[id number]]]

## 2019-05-02 NOTE — PROGRESS NOTE ADULT - PROBLEM SELECTOR PLAN 3
- patient with SOB, +cough, CT with ground glass opacities   - levofloxacin (renally dosed) for CAP (currently day 7). Will do 7d course as per ID. D/C today

## 2019-05-02 NOTE — PROGRESS NOTE ADULT - ATTENDING COMMENTS
AS ABOVE;  : pt was started empirically on levofloxacin for pneumonia . doubt it: she does have mild leukocytosis but afebrile: CT scan suggestive of air trappin/30: today she was started on steroids for gout attack: Her breathing seems to be better: AAWITING anjali:  : breathing wise she seems to be doing ok : Has severe MR: with PFO with left to right shunt:

## 2019-05-02 NOTE — DISCHARGE NOTE PROVIDER - CARE PROVIDERS DIRECT ADDRESSES
,[email or web address],DirectAddress_Unknown,DirectAddress_Unknown,[email or web address]

## 2019-05-02 NOTE — PROGRESS NOTE ADULT - PROBLEM SELECTOR PLAN 1
- patient slightly volume up, new crackles. Likely 2/2 to severe MR. c/w bumex 1mg PO QD   - On LV, Severe LV dysfunction. Severe pulm HTN. severe MR.   - hold beta blockers in the setting of acute decompensated heart failure  - strict I/O  - No ACE/ARB given MERVIN   - increase hydralazine 20mg q8h to hydralazine 25mg q8h. Continue with isordil 10mg   - d/c Dayne

## 2019-05-02 NOTE — PROGRESS NOTE ADULT - ASSESSMENT
73YOF with hx of CAD s/p CABG, hypothyroidism, CKD, CHF, HTN, DM p/w worsening ZEE and sob. Patient usually can walk up a few steps before feeling sob, currently feeling sob after walking from living room to kitchen.  She is using 3 pillows to sleep. No cough, fevers. (+) CP, SS and constant, She experienced more ZEE for last two days.  She has no edema in lower extremity.  Last admission to [facility] was 11/18. (24 Apr 2019 10:21)    ER vss.  Pt afebrile.  WBC 8.7 --> 10.7.  UA (-), RVP (-).  4/25 cxr with L hazy retrocardiac opacity.   Pt found to have NSTEMI and gross fluid overload, started on IV lasix.  She is pending [facility].        ID consult called for further abx managment and evaluation for pna.       Recommend:    - Pt with ZEE/SOB, (+) NSTEMI.  Cxr with L hazy opacity.  Pt w/o cough or fever to suggest pna on clinical basis.  CT chest shows mosaic attentuation/pneumonitis/atypical infection.       - Azithro d/c'd due to allergic reaction. Legionella Ag neg.  Pct is mildly elevated.      - Check repeat cxr following diuresis, evaluate for developing consolidations/pna.  Abx changed to levaquin by primary team.  Recommend checking serial EKGs and Qtc interval (last Qtc = 504) while pt on fluroquinolone.   Complete 7 day course on 5/2     - Pt transferred to CCU for continued managment of acute decompensated CHF.   TTE with severe LV dysfunction, severe MR and PH.   LV shows ischemic MR.     Will follow,    Joselin Roman  [phone]

## 2019-05-02 NOTE — DISCHARGE NOTE PROVIDER - NSDCCPCAREPLAN_GEN_ALL_CORE_FT
PRINCIPAL DISCHARGE DIAGNOSIS  Diagnosis: Acute CHF  Assessment and Plan of Treatment: Low salt diet, fluid restriction to 1500 ml daily, monitor your fluid intake and weight daily, exercise as tolerated 30 minutes daily, and follow up with your physician within 1 to 2 weeks.        SECONDARY DISCHARGE DIAGNOSES  Diagnosis: Gout  Assessment and Plan of Treatment: Complete Steroids    Diagnosis: PNA (pneumonia)  Assessment and Plan of Treatment: Pt Completed antibiotics    Diagnosis: Acute kidney injury superimposed on CKD  Assessment and Plan of Treatment: Continue blood pressure, cholesterol and diabetic medications. Goal of hemoglobin A1C (HgbA1C) < 7%.  Avoid nephrotoxic drugs such as nonsteroidal anti-inflammatory agents (NSAIDs).   Please follow up with your nephrologist to monitor your kidney function, continue with low protein and potassium diet.      Diagnosis: Type 2 diabetes mellitus  Assessment and Plan of Treatment: Monitor finger sticks pre-meal and bedtime, low salt, fat and carbohydrate diet, minimize glucose intake.  Exercise daily for at least 30 minutes and weight loss.  Follow up with primary care physician and endocrinologist for routine Hemoglobin A1C checks and management.  Follow up with your ophthalmologist for routine yearly vision exams.      Diagnosis: Hypothyroid  Assessment and Plan of Treatment: Compliance with Meds    Diagnosis: Mitral regurgitation  Assessment and Plan of Treatment: Outpatient Follow up

## 2019-05-02 NOTE — PROGRESS NOTE ADULT - PROBLEM SELECTOR PLAN 1
to me ct scan looks more like air trapping: SHE IS NOT WHEEZING: BUT DON'T HAVE HER pft : WOULD SUGGEST TO STARTS SYMBICORT AS WELL AS SINGULAIR Daily: doubt pneumonia:   4/30: she seems to eb doing better: cont symbicort as wellas singulair:  5/2: She has been stable: no SOB : being diuresed: Has severe MR with PFO with left to rigth flow: Cont BD

## 2019-05-02 NOTE — PROGRESS NOTE ADULT - SUBJECTIVE AND OBJECTIVE BOX
[facility]   MD RAHEEL SHAH MD ANGELA WONG, PA    TEL:  OFFICE: [phone]  DR SANTOYO CELL: [phone]  DR. NGUYEN CELL: [phone]  UMM KENDALL CELL: [phone]        Patient is a 73y old  Female who presents with a chief complaint of sob (02 May 2019 10:59)      Patient seen and examined at bedside. sob better today    VITALS:  T(F): 98.2 (05-02-19 @ 08:00), Max: 98.2 (05-02-19 @ 08:00)  HR: 97 (05-02-19 @ 11:10)  BP: 106/59 (05-02-19 @ 11:10)  RR: 22 (05-02-19 @ 11:10)  SpO2: 100% (05-02-19 @ 11:10)  Wt(kg): --    05-01 @ 07:01  -  05-02 @ 07:00  --------------------------------------------------------  IN: 240 mL / OUT: 1675 mL / NET: -1435 mL    05-02 @ 07:01  -  05-02 @ 11:39  --------------------------------------------------------  IN: 240 mL / OUT: 360 mL / NET: -120 mL          PHYSICAL EXAM:  Constitutional: NAD  Neck: No JVD  Respiratory: CTAB, no wheezes, rales or rhonchi  Cardiovascular: S1, S2, RRR  Gastrointestinal: BS+, soft, NT/ND  Extremities: No peripheral edema    Hospital Medications:   MEDICATIONS  (STANDING):  ALBUTerol/ipratropium for Nebulization 3 milliLiter(s) Nebulizer every 6 hours  aspirin enteric coated 81 milliGRAM(s) Oral daily  atorvastatin 40 milliGRAM(s) Oral at bedtime  buDESOnide 160 MICROgram(s)/formoterol 4.5 MICROgram(s) Inhaler 2 Puff(s) Inhalation two times a day  buMETAnide 1 milliGRAM(s) Oral daily  chlorhexidine 4% Liquid 1 Application(s) Topical <User Schedule>  colchicine 1.2 milliGRAM(s) Oral once  dextrose 5%. 1000 milliLiter(s) (50 mL/Hr) IV Continuous <Continuous>  dextrose 50% Injectable 12.5 Gram(s) IV Push once  dextrose 50% Injectable 25 Gram(s) IV Push once  dextrose 50% Injectable 25 Gram(s) IV Push once  docusate sodium 100 milliGRAM(s) Oral two times a day  gabapentin 100 milliGRAM(s) Oral two times a day  heparin  Injectable 5000 Unit(s) SubCutaneous every 8 hours  hydrALAZINE 25 milliGRAM(s) Oral three times a day  influenza   Vaccine 0.5 milliLiter(s) IntraMuscular once  insulin glargine Injectable (LANTUS) 65 Unit(s) SubCutaneous at bedtime  insulin lispro (HumaLOG) corrective regimen sliding scale   SubCutaneous three times a day before meals  insulin lispro (HumaLOG) corrective regimen sliding scale   SubCutaneous at bedtime  insulin lispro Injectable (HumaLOG) 30 Unit(s) SubCutaneous three times a day before meals  isosorbide   dinitrate Tablet (ISORDIL) 10 milliGRAM(s) Oral three times a day  levoFLOXacin  Tablet 500 milliGRAM(s) Oral every 48 hours  levothyroxine 50 MICROGram(s) Oral daily  montelukast 10 milliGRAM(s) Oral daily  pantoprazole    Tablet 40 milliGRAM(s) Oral before breakfast  polyethylene glycol 3350 17 Gram(s) Oral two times a day  predniSONE   Tablet 20 milliGRAM(s) Oral daily  senna 2 Tablet(s) Oral at bedtime  sodium chloride 0.65% Nasal 1 Spray(s) Both Nostrils three times a day  ticagrelor 90 milliGRAM(s) Oral two times a day      LABS:  05-02    142  |  104  |  83<H>  ----------------------------<  218<H>  4.2   |  18<L>  |  2.35<H>    Ca    9.5      02 May 2019 05:15  Phos  4.1     05-02  Mg     2.9     05-02      Creatinine Trend: 2.35 <--, 2.26 <--, 2.13 <--, 2.19 <--, 2.08 <--, 2.06 <--, 2.09 <--, 2.03 <--, 2.02 <--, 1.98 <--, 2.06 <--, 1.97 <--, 2.06 <--, 1.92 <--, 1.89 <--    Phosphorus Level, Serum: 4.1 mg/dL (05-02 @ 05:15)                              8.7    12.82 )-----------( 367      ( 02 May 2019 05:15 )             28.0     Urine Studies:  Urinalysis - [04-25-19 @ 09:30]      Color LIGHT YELLOW / Appearance CLEAR / SG 1.011 / pH 6.0      Gluc 70 / Ketone NEGATIVE  / Bili NEGATIVE / Urobili NORMAL       Blood NEGATIVE / Protein NEGATIVE / Leuk Est NEGATIVE / Nitrite NEGATIVE      RBC  / WBC  / Hyaline  / Gran  / Sq Epi  / Non Sq Epi  / Bacteria       .4 (Ca --)      [04-25-19 @ 05:45]   --  PTH 43.55 (Ca --)      [09-10-18 @ 07:15]   --  PTH 36.60 (Ca --)      [09-08-18 @ 03:15]   --  Vitamin D (25OH) 25.9      [05-01-19 @ 04:00]  HbA1c 7.3      [04-25-19 @ 05:45]  TSH 1.59      [04-25-19 @ 05:45]  Lipid: chol 127, , HDL 22, LDL 67      [04-24-19 @ 12:21]    HCV 0.06, Nonreactive Hepatitis C AB  S/CO Ratio                        Interpretation  < 1.00                                   Non-Reactive  1.00 - 4.99                         Weakly-Reactive  > 5.00                                Reactive  Non-Reactive: A person with a non-reactive HCV antibody  result is considered uninfected.  No further action is  needed unless recent infection is suspected.  In these  cases, consider repeat testing later to detect  seroconversion..  Weakly-Reactive: HCV antibody test is abnormal, HCV RNA  Qualitative test will follow.  Reactive: HCV antibody test is abnormal, HCV RNA  Qualitative test will follow.  Note: HCV antibody testing is performed on the [employer] system.      [04-25-19 @ 05:45]      RADIOLOGY & ADDITIONAL STUDIES:

## 2019-05-02 NOTE — PROGRESS NOTE ADULT - SUBJECTIVE AND OBJECTIVE BOX
Patient seen and examined at bedside  c/o left toe/foot pain, otherwise no new complaints  Case discussed with medical team    HPI:  73YOF with hx of CAD s/p CABG, hypothyroidism, CKD, CHF, HTN, DM p/w worsening ZEE and sob. Patient usually can walk up a few steps before feeling sob, currently feeling sob after walking from living room to kitchen.  She is using 3 pillows to sleep. No cough, fevers. (+) CP, SS and constant, She experienced more ZEE for last two days.  She has no edema in lower extremety.  Last admission to [facility] was 11/18. (24 Apr 2019 10:21)      PAST MEDICAL & SURGICAL HISTORY:  GERD (gastroesophageal reflux disease)  CAD (coronary artery disease): s/p CABG  Hypothyroidism  Hyperlipidemia  Diabetes mellitus, type 2  S/P CABG (coronary artery bypass graft)      azithromycin (Hives; Pruritus)       MEDICATIONS  (STANDING):  ALBUTerol/ipratropium for Nebulization 3 milliLiter(s) Nebulizer every 6 hours  aspirin enteric coated 81 milliGRAM(s) Oral daily  atorvastatin 40 milliGRAM(s) Oral at bedtime  buDESOnide 160 MICROgram(s)/formoterol 4.5 MICROgram(s) Inhaler 2 Puff(s) Inhalation two times a day  buMETAnide 1 milliGRAM(s) Oral daily  chlorhexidine 4% Liquid 1 Application(s) Topical <User Schedule>  dextrose 5%. 1000 milliLiter(s) (50 mL/Hr) IV Continuous <Continuous>  dextrose 50% Injectable 12.5 Gram(s) IV Push once  dextrose 50% Injectable 25 Gram(s) IV Push once  dextrose 50% Injectable 25 Gram(s) IV Push once  docusate sodium 100 milliGRAM(s) Oral two times a day  gabapentin 100 milliGRAM(s) Oral two times a day  heparin  Injectable 5000 Unit(s) SubCutaneous every 8 hours  hydrALAZINE 20 milliGRAM(s) Oral every 8 hours  influenza   Vaccine 0.5 milliLiter(s) IntraMuscular once  insulin glargine Injectable (LANTUS) 65 Unit(s) SubCutaneous at bedtime  insulin lispro (HumaLOG) corrective regimen sliding scale   SubCutaneous three times a day before meals  insulin lispro (HumaLOG) corrective regimen sliding scale   SubCutaneous at bedtime  insulin lispro Injectable (HumaLOG) 30 Unit(s) SubCutaneous three times a day before meals  isosorbide   dinitrate Tablet (ISORDIL) 10 milliGRAM(s) Oral three times a day  levoFLOXacin  Tablet 500 milliGRAM(s) Oral every 48 hours  levothyroxine 50 MICROGram(s) Oral daily  montelukast 10 milliGRAM(s) Oral daily  pantoprazole    Tablet 40 milliGRAM(s) Oral before breakfast  polyethylene glycol 3350 17 Gram(s) Oral two times a day  predniSONE   Tablet 20 milliGRAM(s) Oral daily  senna 2 Tablet(s) Oral at bedtime  sodium chloride 0.65% Nasal 1 Spray(s) Both Nostrils three times a day  ticagrelor 90 milliGRAM(s) Oral two times a day    MEDICATIONS  (PRN):  acetaminophen   Tablet .. 650 milliGRAM(s) Oral every 6 hours PRN Mild Pain (1 - 3), Moderate Pain (4 - 6), Severe Pain (7 - 10)  ALBUTerol/ipratropium for Nebulization. 3 milliLiter(s) Nebulizer once PRN Shortness of Breath  dextrose 40% Gel 15 Gram(s) Oral once PRN Blood Glucose LESS THAN 70 milliGRAM(s)/deciliter  diphenhydrAMINE 25 milliGRAM(s) Oral every 4 hours PRN Rash and/or Itching  glucagon  Injectable 1 milliGRAM(s) IntraMuscular once PRN Glucose LESS THAN 70 milligrams/deciliter  ondansetron Injectable 4 milliGRAM(s) IV Push once PRN Nausea and/or Vomiting      REVIEW OF SYSTEMS:  CONSTITUTIONAL: (+) malaise. fatigue  EYES: No acute change in vision   ENT:  No tinnitus  NECK: No stiffness  RESPIRATORY: [email or web address]. No hemoptysis  CARDIOVASCULAR: decreased exercise tolerance. No active chest pain, palpitations, syncope  GASTROINTESTINAL: No hematemesis, diarrhea, melena, or hematochezia.  GENITOURINARY: No hematuria  NEUROLOGICAL: No headaches  LYMPH Nodes: No enlarged glands  ENDOCRINE: No heat or cold intolerance	    Vital Signs Last 24 Hrs  T(C): 36.8 (02 May 2019 08:00), Max: 36.8 (01 May 2019 11:38)  T(F): 98.2 (02 May 2019 08:00), Max: 98.3 (01 May 2019 11:38)  HR: 94 (02 May 2019 09:00) (86 - 108)  BP: 102/53 (02 May 2019 09:00) (91/63 - 124/57)  BP(mean): 67 (02 May 2019 09:00) (65 - 77)  RR: 23 (02 May 2019 09:00) (12 - 34)  SpO2: 100% (02 May 2019 09:00) (95% - 100%)    PHYSICAL EXAMINATION:   Constitutional: NAD  HEENT: AT  Neck:  Supple  Respiratory:  Adequate airflow b/l. Not using accessory muscles of respiration.  Cardiovascular:  systolic murmur, S1 & S2 intact, no R/G, 2+ radial pulses b/l  Gastrointestinal: Soft, NT, ND, normoactive b.s., no organomegaly/RT/rigidity  Extremities: WWP  Neurological:  Alert and awake.  No acute focal motor deficits. Crude sensation intact.     Labs and imaging reviewed  Basic Metabolic Panel w/Mg &amp; Inorg Phos (05.02.19 @ 05:15)    Phosphorus Level, Serum: 4.1 mg/dL    eGFR if Non : 20: The units for eGFR are ml/min/1.73m2 (normalized body  surface area). The eGFR is calculated from a serum  creatinine using the CKD-EPI equation. Other variables  required for calculation are race, age and sex. Among  patients with chronic kidney disease (CKD), the eGFR is  useful in determining the stage of disease according to  KDOQI CKD classification. All eGFR results are reported  numerically with the following interpretation.    GFR  (ml/min/1.73 m2)          W/KIDNEY DAMAGE    W/O KIDNEY DMG  ==========================================================  >= 90.......................Stage 1..............Normal  60-89.......................Stage 2...........Decreased GFR  30-59.......................Stage 3..............Stage 3  15-29.......................Stage 4..............Stage 4  < 15........................Stage 5..............Stage 5    Each stage of CKD assumes that the associated GFR level  has been in effect for at least 3 months. Determination of  stages one and two (with eGFR > 59ml/min/m2) requires  estimation of kidney damage for at least 3 months as  defined by structural or functional abnormalities.    Limitations: All estimates of GFR will be less accurate  for patients at extremes of muscle mass (including but  not limited to frail elderly, critically ill, or cancer  patients), those with unusual diets, and those with  conditions associated with reduced secretion or  extrarenal elimination of creatinine. The eGFR equation  is not recommended for use in patients with unstable  creatinine levels. mL/min    eGFR if : 23 mL/min    Calcium, Total Serum: 9.5 mg/dL    Sodium, Serum: 142 mmol/L    Potassium, Serum: 4.2 mmol/L    Chloride, Serum: 104: Delta: 99 on 05/01/  Delta: 99 on 05/01/ mmol/L    Carbon Dioxide, Serum: 18 mmol/L    Anion Gap, Serum: 20 mmo/L    Blood Urea Nitrogen, Serum: 83 mg/dL    Creatinine, Serum: 2.35 mg/dL    Glucose, Serum: 218 mg/dL    Magnesium, Serum: 2.9 mg/dL

## 2019-05-03 LAB
ALBUMIN SERPL ELPH-MCNC: 3.8 G/DL — SIGNIFICANT CHANGE UP (ref 3.3–5)
ALP SERPL-CCNC: 62 U/L — SIGNIFICANT CHANGE UP (ref 40–120)
ALT FLD-CCNC: 20 U/L — SIGNIFICANT CHANGE UP (ref 4–33)
ANION GAP SERPL CALC-SCNC: 17 MMO/L — HIGH (ref 7–14)
AST SERPL-CCNC: 25 U/L — SIGNIFICANT CHANGE UP (ref 4–32)
BILIRUB SERPL-MCNC: 0.3 MG/DL — SIGNIFICANT CHANGE UP (ref 0.2–1.2)
BUN SERPL-MCNC: 80 MG/DL — HIGH (ref 7–23)
CALCIUM SERPL-MCNC: 9.4 MG/DL — SIGNIFICANT CHANGE UP (ref 8.4–10.5)
CHLORIDE SERPL-SCNC: 104 MMOL/L — SIGNIFICANT CHANGE UP (ref 98–107)
CO2 SERPL-SCNC: 20 MMOL/L — LOW (ref 22–31)
CREAT SERPL-MCNC: 2.28 MG/DL — HIGH (ref 0.5–1.3)
GLUCOSE SERPL-MCNC: 158 MG/DL — HIGH (ref 70–99)
HCT VFR BLD CALC: 27.6 % — LOW (ref 34.5–45)
HGB BLD-MCNC: 8.5 G/DL — LOW (ref 11.5–15.5)
MAGNESIUM SERPL-MCNC: 2.8 MG/DL — HIGH (ref 1.6–2.6)
MCHC RBC-ENTMCNC: 28.1 PG — SIGNIFICANT CHANGE UP (ref 27–34)
MCHC RBC-ENTMCNC: 30.8 % — LOW (ref 32–36)
MCV RBC AUTO: 91.4 FL — SIGNIFICANT CHANGE UP (ref 80–100)
NRBC # FLD: 0.02 K/UL — SIGNIFICANT CHANGE UP (ref 0–0)
PHOSPHATE SERPL-MCNC: 3.7 MG/DL — SIGNIFICANT CHANGE UP (ref 2.5–4.5)
PLATELET # BLD AUTO: 395 K/UL — SIGNIFICANT CHANGE UP (ref 150–400)
PMV BLD: 9.1 FL — SIGNIFICANT CHANGE UP (ref 7–13)
POTASSIUM SERPL-MCNC: 4.3 MMOL/L — SIGNIFICANT CHANGE UP (ref 3.5–5.3)
POTASSIUM SERPL-SCNC: 4.3 MMOL/L — SIGNIFICANT CHANGE UP (ref 3.5–5.3)
PROT SERPL-MCNC: 7.6 G/DL — SIGNIFICANT CHANGE UP (ref 6–8.3)
RBC # BLD: 3.02 M/UL — LOW (ref 3.8–5.2)
RBC # FLD: 16.6 % — HIGH (ref 10.3–14.5)
SODIUM SERPL-SCNC: 141 MMOL/L — SIGNIFICANT CHANGE UP (ref 135–145)
WBC # BLD: 13.67 K/UL — HIGH (ref 3.8–10.5)
WBC # FLD AUTO: 13.67 K/UL — HIGH (ref 3.8–10.5)

## 2019-05-03 PROCEDURE — 99233 SBSQ HOSP IP/OBS HIGH 50: CPT

## 2019-05-03 RX ORDER — HYDRALAZINE HCL 50 MG
37.5 TABLET ORAL THREE TIMES A DAY
Qty: 0 | Refills: 0 | Status: DISCONTINUED | OUTPATIENT
Start: 2019-05-03 | End: 2019-05-07

## 2019-05-03 RX ORDER — BUMETANIDE 0.25 MG/ML
2 INJECTION INTRAMUSCULAR; INTRAVENOUS DAILY
Qty: 0 | Refills: 0 | Status: DISCONTINUED | OUTPATIENT
Start: 2019-05-04 | End: 2019-05-07

## 2019-05-03 RX ORDER — BUMETANIDE 0.25 MG/ML
1 INJECTION INTRAMUSCULAR; INTRAVENOUS ONCE
Qty: 0 | Refills: 0 | Status: COMPLETED | OUTPATIENT
Start: 2019-05-03 | End: 2019-05-03

## 2019-05-03 RX ADMIN — INSULIN GLARGINE 65 UNIT(S): 100 INJECTION, SOLUTION SUBCUTANEOUS at 21:47

## 2019-05-03 RX ADMIN — MONTELUKAST 10 MILLIGRAM(S): 4 TABLET, CHEWABLE ORAL at 11:39

## 2019-05-03 RX ADMIN — Medication 30 UNIT(S): at 09:10

## 2019-05-03 RX ADMIN — ISOSORBIDE DINITRATE 10 MILLIGRAM(S): 5 TABLET ORAL at 13:08

## 2019-05-03 RX ADMIN — Medication 100 MILLIGRAM(S): at 17:13

## 2019-05-03 RX ADMIN — Medication 1: at 09:10

## 2019-05-03 RX ADMIN — PANTOPRAZOLE SODIUM 40 MILLIGRAM(S): 20 TABLET, DELAYED RELEASE ORAL at 07:02

## 2019-05-03 RX ADMIN — Medication 3 MILLILITER(S): at 03:58

## 2019-05-03 RX ADMIN — GABAPENTIN 100 MILLIGRAM(S): 400 CAPSULE ORAL at 17:13

## 2019-05-03 RX ADMIN — ATORVASTATIN CALCIUM 40 MILLIGRAM(S): 80 TABLET, FILM COATED ORAL at 21:51

## 2019-05-03 RX ADMIN — Medication 3 MILLILITER(S): at 17:08

## 2019-05-03 RX ADMIN — Medication 3 MILLILITER(S): at 22:00

## 2019-05-03 RX ADMIN — ISOSORBIDE DINITRATE 10 MILLIGRAM(S): 5 TABLET ORAL at 21:52

## 2019-05-03 RX ADMIN — Medication 37.5 MILLIGRAM(S): at 21:49

## 2019-05-03 RX ADMIN — Medication 1 SPRAY(S): at 07:03

## 2019-05-03 RX ADMIN — ISOSORBIDE DINITRATE 10 MILLIGRAM(S): 5 TABLET ORAL at 07:02

## 2019-05-03 RX ADMIN — Medication 3 MILLILITER(S): at 09:56

## 2019-05-03 RX ADMIN — POLYETHYLENE GLYCOL 3350 17 GRAM(S): 17 POWDER, FOR SOLUTION ORAL at 17:12

## 2019-05-03 RX ADMIN — Medication 30 UNIT(S): at 17:59

## 2019-05-03 RX ADMIN — Medication 50 MICROGRAM(S): at 07:01

## 2019-05-03 RX ADMIN — HEPARIN SODIUM 5000 UNIT(S): 5000 INJECTION INTRAVENOUS; SUBCUTANEOUS at 13:10

## 2019-05-03 RX ADMIN — BUDESONIDE AND FORMOTEROL FUMARATE DIHYDRATE 2 PUFF(S): 160; 4.5 AEROSOL RESPIRATORY (INHALATION) at 00:10

## 2019-05-03 RX ADMIN — BUMETANIDE 1 MILLIGRAM(S): 0.25 INJECTION INTRAMUSCULAR; INTRAVENOUS at 16:07

## 2019-05-03 RX ADMIN — Medication 25 MILLIGRAM(S): at 13:09

## 2019-05-03 RX ADMIN — BUMETANIDE 1 MILLIGRAM(S): 0.25 INJECTION INTRAMUSCULAR; INTRAVENOUS at 07:01

## 2019-05-03 RX ADMIN — Medication 100 MILLIGRAM(S): at 07:02

## 2019-05-03 RX ADMIN — Medication 30 UNIT(S): at 13:10

## 2019-05-03 RX ADMIN — TICAGRELOR 90 MILLIGRAM(S): 90 TABLET ORAL at 07:01

## 2019-05-03 RX ADMIN — GABAPENTIN 100 MILLIGRAM(S): 400 CAPSULE ORAL at 07:01

## 2019-05-03 RX ADMIN — CHLORHEXIDINE GLUCONATE 1 APPLICATION(S): 213 SOLUTION TOPICAL at 13:14

## 2019-05-03 RX ADMIN — POLYETHYLENE GLYCOL 3350 17 GRAM(S): 17 POWDER, FOR SOLUTION ORAL at 07:02

## 2019-05-03 RX ADMIN — Medication 1 SPRAY(S): at 13:10

## 2019-05-03 RX ADMIN — HEPARIN SODIUM 5000 UNIT(S): 5000 INJECTION INTRAVENOUS; SUBCUTANEOUS at 07:01

## 2019-05-03 RX ADMIN — BUDESONIDE AND FORMOTEROL FUMARATE DIHYDRATE 2 PUFF(S): 160; 4.5 AEROSOL RESPIRATORY (INHALATION) at 09:10

## 2019-05-03 RX ADMIN — TICAGRELOR 90 MILLIGRAM(S): 90 TABLET ORAL at 17:13

## 2019-05-03 RX ADMIN — INSULIN GLARGINE 65 UNIT(S): 100 INJECTION, SOLUTION SUBCUTANEOUS at 00:09

## 2019-05-03 RX ADMIN — Medication 25 MILLIGRAM(S): at 07:02

## 2019-05-03 RX ADMIN — Medication 2: at 13:10

## 2019-05-03 RX ADMIN — Medication 81 MILLIGRAM(S): at 11:39

## 2019-05-03 RX ADMIN — HEPARIN SODIUM 5000 UNIT(S): 5000 INJECTION INTRAVENOUS; SUBCUTANEOUS at 21:52

## 2019-05-03 RX ADMIN — Medication 20 MILLIGRAM(S): at 07:01

## 2019-05-03 NOTE — PROGRESS NOTE ADULT - SUBJECTIVE AND OBJECTIVE BOX
Patient is a 73y old  Female who presents with a chief complaint of sob (03 May 2019 12:49)      INTERVAL HPI/OVERNIGHT EVENTS:  T(C): 36.3 (05-03-19 @ 12:17), Max: 36.6 (05-02-19 @ 20:59)  HR: 96 (05-03-19 @ 13:15) (61 - 99)  BP: 118/66 (05-03-19 @ 13:15) (96/51 - 120/63)  RR: 18 (05-03-19 @ 13:15) (18 - 20)  SpO2: 100% (05-03-19 @ 13:15) (96% - 100%)  Wt(kg): --  I&O's Summary    02 May 2019 07:01  -  03 May 2019 07:00  --------------------------------------------------------  IN: 340 mL / OUT: 561 mL / NET: -221 mL    03 May 2019 07:01  -  03 May 2019 16:07  --------------------------------------------------------  IN: 0 mL / OUT: 250 mL / NET: -250 mL        LABS:                        8.5    13.67 )-----------( 395      ( 03 May 2019 06:20 )             27.6     05-03    141  |  104  |  80<H>  ----------------------------<  158<H>  4.3   |  20<L>  |  2.28<H>    Ca    9.4      03 May 2019 06:20  Phos  3.7     05-03  Mg     2.8     05-03    TPro  7.6  /  Alb  3.8  /  TBili  0.3  /  DBili  x   /  AST  25  /  ALT  20  /  AlkPhos  62  05-03        CAPILLARY BLOOD GLUCOSE      POCT Blood Glucose.: 206 mg/dL (03 May 2019 12:36)  POCT Blood Glucose.: 169 mg/dL (03 May 2019 08:55)  POCT Blood Glucose.: 281 mg/dL (02 May 2019 23:11)  POCT Blood Glucose.: 263 mg/dL (02 May 2019 21:45)  POCT Blood Glucose.: 218 mg/dL (02 May 2019 17:01)            MEDICATIONS  (STANDING):  ALBUTerol/ipratropium for Nebulization 3 milliLiter(s) Nebulizer every 6 hours  aspirin enteric coated 81 milliGRAM(s) Oral daily  atorvastatin 40 milliGRAM(s) Oral at bedtime  buDESOnide 160 MICROgram(s)/formoterol 4.5 MICROgram(s) Inhaler 2 Puff(s) Inhalation two times a day  buMETAnide 1 milliGRAM(s) Oral once  chlorhexidine 4% Liquid 1 Application(s) Topical <User Schedule>  dextrose 5%. 1000 milliLiter(s) (50 mL/Hr) IV Continuous <Continuous>  dextrose 50% Injectable 12.5 Gram(s) IV Push once  dextrose 50% Injectable 25 Gram(s) IV Push once  dextrose 50% Injectable 25 Gram(s) IV Push once  docusate sodium 100 milliGRAM(s) Oral two times a day  gabapentin 100 milliGRAM(s) Oral two times a day  heparin  Injectable 5000 Unit(s) SubCutaneous every 8 hours  hydrALAZINE 37.5 milliGRAM(s) Oral three times a day  influenza   Vaccine 0.5 milliLiter(s) IntraMuscular once  insulin glargine Injectable (LANTUS) 65 Unit(s) SubCutaneous at bedtime  insulin lispro (HumaLOG) corrective regimen sliding scale   SubCutaneous three times a day before meals  insulin lispro (HumaLOG) corrective regimen sliding scale   SubCutaneous at bedtime  insulin lispro Injectable (HumaLOG) 30 Unit(s) SubCutaneous three times a day before meals  isosorbide   dinitrate Tablet (ISORDIL) 10 milliGRAM(s) Oral three times a day  levothyroxine 50 MICROGram(s) Oral daily  montelukast 10 milliGRAM(s) Oral daily  pantoprazole    Tablet 40 milliGRAM(s) Oral before breakfast  polyethylene glycol 3350 17 Gram(s) Oral two times a day  predniSONE   Tablet 20 milliGRAM(s) Oral daily  senna 2 Tablet(s) Oral at bedtime  sodium chloride 0.65% Nasal 1 Spray(s) Both Nostrils three times a day  ticagrelor 90 milliGRAM(s) Oral two times a day    MEDICATIONS  (PRN):  acetaminophen   Tablet .. 650 milliGRAM(s) Oral every 6 hours PRN Mild Pain (1 - 3), Moderate Pain (4 - 6), Severe Pain (7 - 10)  ALBUTerol/ipratropium for Nebulization. 3 milliLiter(s) Nebulizer once PRN Shortness of Breath  dextrose 40% Gel 15 Gram(s) Oral once PRN Blood Glucose LESS THAN 70 milliGRAM(s)/deciliter  diphenhydrAMINE 25 milliGRAM(s) Oral every 4 hours PRN Rash and/or Itching  glucagon  Injectable 1 milliGRAM(s) IntraMuscular once PRN Glucose LESS THAN 70 milligrams/deciliter  ondansetron Injectable 4 milliGRAM(s) IV Push once PRN Nausea and/or Vomiting          PHYSICAL EXAM:  GENERAL: NAD, well-groomed, well-developed  HEAD:  Atraumatic, Normocephalic  CHEST/LUNG: Clear to percussion bilaterally; No rales, rhonchi, wheezing, or rubs  HEART: Regular rate and rhythm; No murmurs, rubs, or gallops  ABDOMEN: Soft, Nontender, Nondistended; Bowel sounds present  EXTREMITIES:  2+ Peripheral Pulses, No clubbing, cyanosis, or edema  LYMPH: No lymphadenopathy noted  SKIN: No rashes or lesions    Care Discussed with Consultants/Other Providers [ ] YES  [ ] NO

## 2019-05-03 NOTE — PROGRESS NOTE ADULT - ASSESSMENT
73YOF with hx of CAD s/p CABG, hypothyroidism, CKD, CHF, HTN, DM p/w worsening ZEE and sob.    A/p  MERVIN  Renal function slightly better today  diuretics decreased to bumex 1mg po daily, clinically improving   monitor bmp  optimize glucose control  avoid nephrotoxic agents  **If pt planned for cardiac cath , pt is 26% risk of developing NGHIA and 1.09% risk of requiring RRT. In view of fluid overload status, no IVF prior to cath. Recommend to hold morning dose of lasix on the day of procedure    CKD stage 3  baseline cr 1.5-1.8  likely sec to HTN/DM/chf  elevated PTH, r/o vit d def check vit d 25  phos optimal    CHF  monitor daily weight and i/o  diuretics per cardio  f/u cardio

## 2019-05-03 NOTE — PROGRESS NOTE ADULT - ATTENDING COMMENTS
AS ABOVE;  : pt was started empirically on levofloxacin for pneumonia . doubt it: she does have mild leukocytosis but afebrile: CT scan suggestive of air trappin/30: today she was started on steroids for gout attack: Her breathing seems to be better: AAWITING anjali:  : breathing wise she seems to be doing ok : Has severe MR: with PFO with left to right shunt:  5/3: for cts evaluation!

## 2019-05-03 NOTE — PROGRESS NOTE ADULT - PROBLEM SELECTOR PLAN 2
4/27 chest pain is stable. on dual antiplatelet therapy. management per cardiology  4/28 per cardiology some point heart cath  4/29: per cardiology : she is on diuretics!  4/30: has severe MR for anjali in AM  5/2: ANJALI reviewed: has severe MR:  5/3: cts evalaution pending:

## 2019-05-03 NOTE — PROGRESS NOTE ADULT - ASSESSMENT
73YOF with hx of CAD s/p CABG, hypothyroidism, CKD, CHF, HTN, DM p/w worsening ZEE and sob. Patient usually can walk up a few steps before feeling sob, currently feeling sob after walking from living room to kitchen.  She is using 3 pillows to sleep. No cough, fevers. (+) CP, SS and constant, She experienced more ZEE for last two days.  She has no edema in lower extremity.  Last admission to [facility] was 11/18. (24 Apr 2019 10:21)    ER vss.  Pt afebrile.  WBC 8.7 --> 10.7.  UA (-), RVP (-).  4/25 cxr with L hazy retrocardiac opacity.   Pt found to have NSTEMI and gross fluid overload, started on IV lasix.  She is pending [facility].        ID consult called for further abx managment and evaluation for pna.       Recommend:    - Pt with ZEE/SOB, (+) NSTEMI.  Cxr with L hazy opacity.  Pt w/o cough or fever to suggest pna on clinical basis.  CT chest shows mosaic attentuation/pneumonitis/atypical infection.       - Azithro d/c'd due to allergic reaction. Legionella Ag neg.  Pct is mildly elevated.      - Check repeat cxr following diuresis, evaluate for developing consolidations/pna.  Abx changed to levaquin by primary team.    Completed 7 day course on 5/2     - Pt transferred to CCU for continued managment of acute decompensated CHF, now on telemetry.   TTE with severe LV dysfunction, severe MR and PH.   LV shows ischemic MR.     No acute ID issues at this time    Joselin Roman  [phone]

## 2019-05-03 NOTE — PROGRESS NOTE ADULT - SUBJECTIVE AND OBJECTIVE BOX
[facility]   MD RAHEEL SHAH MD ANGELA WONG, PA    TEL:  OFFICE: [phone]  DR SANTOYO CELL: [phone]  DR. GNUYEN CELL: [phone]  UMM KENDALL CELL: [phone]        Patient is a 73y old  Female who presents with a chief complaint of sob (03 May 2019 12:25)      Patient seen and examined at bedside. No chest pain/sob    VITALS:  T(F): 97.4 (05-03-19 @ 12:17), Max: 97.9 (05-02-19 @ 20:59)  HR: 94 (05-03-19 @ 12:17)  BP: 111/63 (05-03-19 @ 12:17)  RR: 18 (05-03-19 @ 12:17)  SpO2: 100% (05-03-19 @ 12:17)  Wt(kg): --    05-02 @ 07:01  -  05-03 @ 07:00  --------------------------------------------------------  IN: 340 mL / OUT: 561 mL / NET: -221 mL    05-03 @ 07:01  -  05-03 @ 12:49  --------------------------------------------------------  IN: 0 mL / OUT: 250 mL / NET: -250 mL          PHYSICAL EXAM:  Constitutional: NAD  Neck: No JVD  Respiratory: slight basilar crackles  Cardiovascular: S1, S2, RRR  Gastrointestinal: BS+, soft, NT/ND  Extremities: No peripheral edema    Hospital Medications:   MEDICATIONS  (STANDING):  ALBUTerol/ipratropium for Nebulization 3 milliLiter(s) Nebulizer every 6 hours  aspirin enteric coated 81 milliGRAM(s) Oral daily  atorvastatin 40 milliGRAM(s) Oral at bedtime  buDESOnide 160 MICROgram(s)/formoterol 4.5 MICROgram(s) Inhaler 2 Puff(s) Inhalation two times a day  buMETAnide 1 milliGRAM(s) Oral daily  chlorhexidine 4% Liquid 1 Application(s) Topical <User Schedule>  dextrose 5%. 1000 milliLiter(s) (50 mL/Hr) IV Continuous <Continuous>  dextrose 50% Injectable 12.5 Gram(s) IV Push once  dextrose 50% Injectable 25 Gram(s) IV Push once  dextrose 50% Injectable 25 Gram(s) IV Push once  docusate sodium 100 milliGRAM(s) Oral two times a day  gabapentin 100 milliGRAM(s) Oral two times a day  heparin  Injectable 5000 Unit(s) SubCutaneous every 8 hours  hydrALAZINE 25 milliGRAM(s) Oral three times a day  influenza   Vaccine 0.5 milliLiter(s) IntraMuscular once  insulin glargine Injectable (LANTUS) 65 Unit(s) SubCutaneous at bedtime  insulin lispro (HumaLOG) corrective regimen sliding scale   SubCutaneous three times a day before meals  insulin lispro (HumaLOG) corrective regimen sliding scale   SubCutaneous at bedtime  insulin lispro Injectable (HumaLOG) 30 Unit(s) SubCutaneous three times a day before meals  isosorbide   dinitrate Tablet (ISORDIL) 10 milliGRAM(s) Oral three times a day  levothyroxine 50 MICROGram(s) Oral daily  montelukast 10 milliGRAM(s) Oral daily  pantoprazole    Tablet 40 milliGRAM(s) Oral before breakfast  polyethylene glycol 3350 17 Gram(s) Oral two times a day  predniSONE   Tablet 20 milliGRAM(s) Oral daily  senna 2 Tablet(s) Oral at bedtime  sodium chloride 0.65% Nasal 1 Spray(s) Both Nostrils three times a day  ticagrelor 90 milliGRAM(s) Oral two times a day      LABS:  05-03    141  |  104  |  80<H>  ----------------------------<  158<H>  4.3   |  20<L>  |  2.28<H>    Ca    9.4      03 May 2019 06:20  Phos  3.7     05-03  Mg     2.8     05-03    TPro  7.6  /  Alb  3.8  /  TBili  0.3  /  DBili      /  AST  25  /  ALT  20  /  AlkPhos  62  05-03    Creatinine Trend: 2.28 <--, 2.38 <--, 2.35 <--, 2.26 <--, 2.13 <--, 2.19 <--, 2.08 <--, 2.06 <--, 2.09 <--, 2.03 <--, 2.02 <--, 1.98 <--, 2.06 <--, 1.97 <--, 2.06 <--    Phosphorus Level, Serum: 3.7 mg/dL (05-03 @ 06:20)  Albumin, Serum: 3.8 g/dL (05-03 @ 06:20)  Phosphorus Level, Serum: 4.0 mg/dL (05-02 @ 17:53)                              8.5    13.67 )-----------( 395      ( 03 May 2019 06:20 )             27.6     Urine Studies:  Urinalysis - [04-25-19 @ 09:30]      Color LIGHT YELLOW / Appearance CLEAR / SG 1.011 / pH 6.0      Gluc 70 / Ketone NEGATIVE  / Bili NEGATIVE / Urobili NORMAL       Blood NEGATIVE / Protein NEGATIVE / Leuk Est NEGATIVE / Nitrite NEGATIVE      RBC  / WBC  / Hyaline  / Gran  / Sq Epi  / Non Sq Epi  / Bacteria     Urine Creatinine 49.70      [05-02-19 @ 11:18]  Urine Sodium 46      [05-02-19 @ 11:18]  Urine Urea Nitrogen 770.0      [05-02-19 @ 11:18]  Urine Osmolality 422      [05-02-19 @ 11:18]    .4 (Ca --)      [04-25-19 @ 05:45]   --  PTH 43.55 (Ca --)      [09-10-18 @ 07:15]   --  PTH 36.60 (Ca --)      [09-08-18 @ 03:15]   --  Vitamin D (25OH) 25.9      [05-01-19 @ 04:00]  HbA1c 7.3      [04-25-19 @ 05:45]  TSH 1.59      [04-25-19 @ 05:45]  Lipid: chol 127, , HDL 22, LDL 67      [04-24-19 @ 12:21]    HCV 0.06, Nonreactive Hepatitis C AB  S/CO Ratio                        Interpretation  < 1.00                                   Non-Reactive  1.00 - 4.99                         Weakly-Reactive  > 5.00                                Reactive  Non-Reactive: A person with a non-reactive HCV antibody  result is considered uninfected.  No further action is  needed unless recent infection is suspected.  In these  cases, consider repeat testing later to detect  seroconversion..  Weakly-Reactive: HCV antibody test is abnormal, HCV RNA  Qualitative test will follow.  Reactive: HCV antibody test is abnormal, HCV RNA  Qualitative test will follow.  Note: HCV antibody testing is performed on the [employer]   system.      [04-25-19 @ 05:45]      RADIOLOGY & ADDITIONAL STUDIES:

## 2019-05-03 NOTE — PROGRESS NOTE ADULT - SUBJECTIVE AND OBJECTIVE BOX
Infectious Diseases progress note:    Subjective:  NAD, afebrile    ROS:  CONSTITUTIONAL:  No fever, chills, rigors  CARDIOVASCULAR:  No chest pain or palpitations  RESPIRATORY:   No SOB, cough, dyspnea on exertion.  No wheezing  GASTROINTESTINAL:  No abd pain, N/V, diarrhea/constipation  EXTREMITIES:  No swelling or joint pain  GENITOURINARY:  No burning on urination, increased frequency or urgency.  No flank pain  NEUROLOGIC:  No HA, visual disturbances  SKIN: No rashes    Allergies    azithromycin (Hives; Pruritus)    Intolerances        ANTIBIOTICS/RELEVANT:  antimicrobials    immunologic:  influenza   Vaccine 0.5 milliLiter(s) IntraMuscular once    OTHER:  acetaminophen   Tablet .. 650 milliGRAM(s) Oral every 6 hours PRN  ALBUTerol/ipratropium for Nebulization 3 milliLiter(s) Nebulizer every 6 hours  ALBUTerol/ipratropium for Nebulization. 3 milliLiter(s) Nebulizer once PRN  aspirin enteric coated 81 milliGRAM(s) Oral daily  atorvastatin 40 milliGRAM(s) Oral at bedtime  buDESOnide 160 MICROgram(s)/formoterol 4.5 MICROgram(s) Inhaler 2 Puff(s) Inhalation two times a day  chlorhexidine 4% Liquid 1 Application(s) Topical <User Schedule>  dextrose 40% Gel 15 Gram(s) Oral once PRN  dextrose 5%. 1000 milliLiter(s) IV Continuous <Continuous>  dextrose 50% Injectable 12.5 Gram(s) IV Push once  dextrose 50% Injectable 25 Gram(s) IV Push once  dextrose 50% Injectable 25 Gram(s) IV Push once  diphenhydrAMINE 25 milliGRAM(s) Oral every 4 hours PRN  docusate sodium 100 milliGRAM(s) Oral two times a day  gabapentin 100 milliGRAM(s) Oral two times a day  glucagon  Injectable 1 milliGRAM(s) IntraMuscular once PRN  heparin  Injectable 5000 Unit(s) SubCutaneous every 8 hours  hydrALAZINE 37.5 milliGRAM(s) Oral three times a day  insulin glargine Injectable (LANTUS) 65 Unit(s) SubCutaneous at bedtime  insulin lispro (HumaLOG) corrective regimen sliding scale   SubCutaneous three times a day before meals  insulin lispro (HumaLOG) corrective regimen sliding scale   SubCutaneous at bedtime  insulin lispro Injectable (HumaLOG) 30 Unit(s) SubCutaneous three times a day before meals  isosorbide   dinitrate Tablet (ISORDIL) 10 milliGRAM(s) Oral three times a day  levothyroxine 50 MICROGram(s) Oral daily  montelukast 10 milliGRAM(s) Oral daily  ondansetron Injectable 4 milliGRAM(s) IV Push once PRN  pantoprazole    Tablet 40 milliGRAM(s) Oral before breakfast  polyethylene glycol 3350 17 Gram(s) Oral two times a day  predniSONE   Tablet 20 milliGRAM(s) Oral daily  senna 2 Tablet(s) Oral at bedtime  sodium chloride 0.65% Nasal 1 Spray(s) Both Nostrils three times a day  ticagrelor 90 milliGRAM(s) Oral two times a day      Objective:  Vital Signs Last 24 Hrs  T(C): 36.7 (03 May 2019 16:07), Max: 36.7 (03 May 2019 16:07)  T(F): 98 (03 May 2019 16:07), Max: 98 (03 May 2019 16:07)  HR: 91 (03 May 2019 16:07) (61 - 99)  BP: 109/68 (03 May 2019 16:07) (96/51 - 120/63)  BP(mean): 66 (02 May 2019 17:00) (66 - 66)  RR: 18 (03 May 2019 16:07) (18 - 20)  SpO2: 100% (03 May 2019 16:07) (96% - 100%)    PHYSICAL EXAM:  Constitutional:NAD  Eyes:MOHSEN, EOMI  Ear/Nose/Throat: no thrush, mucositis.  Moist mucous membranes	  Neck:no JVD, no lymphadenopathy, supple  Respiratory: CTA maribel  Cardiovascular: S1S2 RRR, no murmurs  Gastrointestinal:soft, nontender,  nondistended (+) BS  Extremities:no e/e/c  Skin:  no rashes, open wounds or ulcerations        LABS:                        8.5    13.67 )-----------( 395      ( 03 May 2019 06:20 )             27.6     05-03    141  |  104  |  80<H>  ----------------------------<  158<H>  4.3   |  20<L>  |  2.28<H>    Ca    9.4      03 May 2019 06:20  Phos  3.7     05-03  Mg     2.8     05-03    TPro  7.6  /  Alb  3.8  /  TBili  0.3  /  DBili  x   /  AST  25  /  ALT  20  /  AlkPhos  62  05-03          Procalcitonin, Serum: 0.13 (04-26 @ 11:36)                    MICROBIOLOGY:          RADIOLOGY & ADDITIONAL STUDIES:

## 2019-05-03 NOTE — PROGRESS NOTE ADULT - PROBLEM SELECTOR PLAN 1
to me ct scan looks more like air trapping: SHE IS NOT WHEEZING: BUT DON'T HAVE HER pft : WOULD SUGGEST TO STARTS SYMBICORT AS WELL AS SINGULAIR Daily: doubt pneumonia:   4/30: she seems to eb doing better: cont symbicort as wellas singulair:  5/2: She has been stable: no SOB : being diuresed: Has severe MR with PFO with left to rigth flow: Cont BD  5/3: maris antibtiocs now: cont diuresis: for cts evalution for alonso clip: Cont BD with some evidence of air trapping on ct chest1

## 2019-05-03 NOTE — PROGRESS NOTE ADULT - SUBJECTIVE AND OBJECTIVE BOX
Patient is a 73y old  Female who presents with a chief complaint of sob (03 May 2019 11:36)      Any change in ROS: Sitting inchair: says sheis feeling alright:  for cts evalution    MEDICATIONS  (STANDING):  ALBUTerol/ipratropium for Nebulization 3 milliLiter(s) Nebulizer every 6 hours  aspirin enteric coated 81 milliGRAM(s) Oral daily  atorvastatin 40 milliGRAM(s) Oral at bedtime  buDESOnide 160 MICROgram(s)/formoterol 4.5 MICROgram(s) Inhaler 2 Puff(s) Inhalation two times a day  buMETAnide 1 milliGRAM(s) Oral daily  chlorhexidine 4% Liquid 1 Application(s) Topical <User Schedule>  dextrose 5%. 1000 milliLiter(s) (50 mL/Hr) IV Continuous <Continuous>  dextrose 50% Injectable 12.5 Gram(s) IV Push once  dextrose 50% Injectable 25 Gram(s) IV Push once  dextrose 50% Injectable 25 Gram(s) IV Push once  docusate sodium 100 milliGRAM(s) Oral two times a day  gabapentin 100 milliGRAM(s) Oral two times a day  heparin  Injectable 5000 Unit(s) SubCutaneous every 8 hours  hydrALAZINE 25 milliGRAM(s) Oral three times a day  influenza   Vaccine 0.5 milliLiter(s) IntraMuscular once  insulin glargine Injectable (LANTUS) 65 Unit(s) SubCutaneous at bedtime  insulin lispro (HumaLOG) corrective regimen sliding scale   SubCutaneous three times a day before meals  insulin lispro (HumaLOG) corrective regimen sliding scale   SubCutaneous at bedtime  insulin lispro Injectable (HumaLOG) 30 Unit(s) SubCutaneous three times a day before meals  isosorbide   dinitrate Tablet (ISORDIL) 10 milliGRAM(s) Oral three times a day  levothyroxine 50 MICROGram(s) Oral daily  montelukast 10 milliGRAM(s) Oral daily  pantoprazole    Tablet 40 milliGRAM(s) Oral before breakfast  polyethylene glycol 3350 17 Gram(s) Oral two times a day  predniSONE   Tablet 20 milliGRAM(s) Oral daily  senna 2 Tablet(s) Oral at bedtime  sodium chloride 0.65% Nasal 1 Spray(s) Both Nostrils three times a day  ticagrelor 90 milliGRAM(s) Oral two times a day    MEDICATIONS  (PRN):  acetaminophen   Tablet .. 650 milliGRAM(s) Oral every 6 hours PRN Mild Pain (1 - 3), Moderate Pain (4 - 6), Severe Pain (7 - 10)  ALBUTerol/ipratropium for Nebulization. 3 milliLiter(s) Nebulizer once PRN Shortness of Breath  dextrose 40% Gel 15 Gram(s) Oral once PRN Blood Glucose LESS THAN 70 milliGRAM(s)/deciliter  diphenhydrAMINE 25 milliGRAM(s) Oral every 4 hours PRN Rash and/or Itching  glucagon  Injectable 1 milliGRAM(s) IntraMuscular once PRN Glucose LESS THAN 70 milligrams/deciliter  ondansetron Injectable 4 milliGRAM(s) IV Push once PRN Nausea and/or Vomiting    Vital Signs Last 24 Hrs  T(C): 36.3 (03 May 2019 12:17), Max: 36.6 (02 May 2019 16:00)  T(F): 97.4 (03 May 2019 12:17), Max: 97.9 (02 May 2019 20:59)  HR: 94 (03 May 2019 12:17) (61 - 99)  BP: 111/63 (03 May 2019 12:17) (96/51 - 120/63)  BP(mean): 66 (02 May 2019 17:00) (66 - 76)  RR: 18 (03 May 2019 12:17) (18 - 27)  SpO2: 100% (03 May 2019 12:17) (96% - 100%)    I&O's Summary    02 May 2019 07:01  -  03 May 2019 07:00  --------------------------------------------------------  IN: 340 mL / OUT: 561 mL / NET: -221 mL    03 May 2019 07:01  -  03 May 2019 12:25  --------------------------------------------------------  IN: 0 mL / OUT: 250 mL / NET: -250 mL          Physical Exam:   GENERAL: NAD, well-groomed, well-developed  HEENT: MOHSEN/   Atraumatic, Normocephalic  ENMT: No tonsillar erythema, exudates, or enlargement; Moist mucous membranes, Good dentition, No lesions  NECK: Supple, No JVD, Normal thyroid  CHEST/LUNG: Scattered crackles+  CVS: Regular rate and rhythm; No murmurs, rubs, or gallops  GI: : Soft, Nontender, Nondistended; Bowel sounds present  NERVOUS SYSTEM:  Alert & Oriented X3  EXTREMITIES:  minimal edema  LYMPH: No lymphadenopathy noted  SKIN: No rashes or lesions  ENDOCRINOLOGY: No Thyromegaly  PSYCH: Appropriate    Labs:                              8.5    13.67 )-----------( 395      ( 03 May 2019 06:20 )             27.6                         8.7    12.82 )-----------( 367      ( 02 May 2019 05:15 )             28.0                         9.0    13.11 )-----------( 365      ( 01 May 2019 04:00 )             29.3                         9.1    11.34 )-----------( 307      ( 30 Apr 2019 05:47 )             29.8     05-03    141  |  104  |  80<H>  ----------------------------<  158<H>  4.3   |  20<L>  |  2.28<H>  05-02    138  |  102  |  89<H>  ----------------------------<  238<H>  5.1   |  18<L>  |  2.38<H>  05-02    142  |  104  |  83<H>  ----------------------------<  218<H>  4.2   |  18<L>  |  2.35<H>  05-01    137  |  99  |  73<H>  ----------------------------<  250<H>  4.2   |  20<L>  |  2.26<H>  05-01    138  |  100  |  74<H>  ----------------------------<  236<H>  5.6<H>   |  19<L>  |  2.13<H>  04-30    140  |  100  |  67<H>  ----------------------------<  133<H>  4.7   |  22  |  2.19<H>  04-30    139  |  101  |  60<H>  ----------------------------<  174<H>  4.2   |  21<L>  |  2.08<H>  04-29    140  |  100  |  60<H>  ----------------------------<  158<H>  5.5<H>   |  21<L>  |  2.06<H>  04-29    136  |  99  |  60<H>  ----------------------------<  203<H>  5.6<H>   |  22  |  2.09<H>    Ca    9.4      03 May 2019 06:20  Ca    9.4      02 May 2019 17:53  Ca    9.5      02 May 2019 05:15  Phos  3.7     05-03  Phos  4.0     05-02  Phos  4.1     05-02  Mg     2.8     05-03  Mg     2.8     05-02  Mg     2.9     05-02    TPro  7.6  /  Alb  3.8  /  TBili  0.3  /  DBili  x   /  AST  25  /  ALT  20  /  AlkPhos  62  05-03  TPro  8.2  /  Alb  3.9  /  TBili  0.4  /  DBili  x   /  AST  38<H>  /  ALT  14  /  AlkPhos  61  04-29    CAPILLARY BLOOD GLUCOSE      POCT Blood Glucose.: 169 mg/dL (03 May 2019 08:55)  POCT Blood Glucose.: 281 mg/dL (02 May 2019 23:11)  POCT Blood Glucose.: 263 mg/dL (02 May 2019 21:45)  POCT Blood Glucose.: 218 mg/dL (02 May 2019 17:01)      LIVER FUNCTIONS - ( 03 May 2019 06:20 )  Alb: 3.8 g/dL / Pro: 7.6 g/dL / ALK PHOS: 62 u/L / ALT: 20 u/L / AST: 25 u/L / GGT: x                 < from: Xray Chest 1 View- PORTABLE-Urgent (05.02.19 @ 10:47) >      INTERPRETATION:    Portable chest xray: Shortness of Breath    Comparison: Most recent prior chest radiograph from 4/25/2019.    FINDINGS:    Lines/Tubes: None.    Heartand mediastinum:  Median sternotomy wires, mediastinal surgical   clips, and coronary stent.    Lungs, pleura, and airways: No focal pulmonary consolidation. No pleural   effusion or pneumothorax.    Bones and soft tissues: The bones and soft tissuesare unchanged.    Impression:    Clear lungs.              STACY HERNANDEZ M.D., RADIOLOGY RESIDENT  This document has been electronically signed.  EMIL BRUNO M.D., ATTENDING RADIOLOGIST  This document has been electronically signed. May  2 2019 12:05PM    < end of copied text >      RECENT CULTURES:        RESPIRATORY CULTURES:          Studies  Chest X-RAY  CT SCAN Chest   Venous Dopplers: LE:   CT Abdomen  Others

## 2019-05-03 NOTE — PROGRESS NOTE ADULT - PROBLEM SELECTOR PLAN 1
JVP appears mildly elevated.   Gave Bumex 2 mg x 1 now, then increased daily Bumex to 2 mg.    Not on ACEI/ARB/ARNI due to MERVIN.   Increased hydral to 37.5 mg po TID  Continue isordil 10 mg po TID.   Strict I/O and daily standing weights.   Not on BB currently, ok to keep holding.  Pt to get LV to evaluate MR

## 2019-05-03 NOTE — PROGRESS NOTE ADULT - ATTENDING COMMENTS
Briefly, 72 y/o female with h/o HTN, DM, CAD s/p CABG (w/ prior PCI to Ramus; LIMA-LAD patent), HFrEF (LVEF 25%, LVEDD 5.2 cm), likely mod-severe MR, severe pulm HTN comes in with cough and SOB. CT chest showed ground glass opacities, patient was started on IV zithromax, developed rash, now on Levaquin. She was admitted to telemetry was given a dose of lopressor, patient became hypotensive and went into respiratory distress, patient was then moved to CCU. HF was consulted to help manage this patient who is SOB and is hypotensive. Diuresed with improvement. Developed b/l feet pain (likely gout), improving with prednisone. On exam, NAD, JVP approx 10-12 cm with minimal HJR, abdom nontender, no pedal edema, WWP. Labs reviewed. EKG reviewed. TTE - EF 25%, LVEDD 5.2 cm, severe MR (likely ischemic). LV c/w severe ischemic MR 2/2 posterior leaflet tethering. Overall stage C HF, NYHA class IV w/ possible pulmonary edema 2/2 ischemic MR  - increase bumex to 2 mg daily   - increase hydral 37.5 mg q8h (hold SBP <90)  - continue isordil 10 mg q8h  - will consider BB toprol XL 12.5 mg on Sunday  - pt may be a candidate for mitral clip however frail and has poor renal function which may be prohibitive for mitral clip; also family would like conservative management  - complete prednisone 20 mg daily x 5 day course

## 2019-05-03 NOTE — PROGRESS NOTE ADULT - ASSESSMENT
73YOF w/o h/o CAD, s/p CABG CHF adm to [facility] for Acute on Chronic systolic and diastolic HF

## 2019-05-04 LAB
ANION GAP SERPL CALC-SCNC: 18 MMO/L — HIGH (ref 7–14)
BUN SERPL-MCNC: 81 MG/DL — HIGH (ref 7–23)
CALCIUM SERPL-MCNC: 9.5 MG/DL — SIGNIFICANT CHANGE UP (ref 8.4–10.5)
CHLORIDE SERPL-SCNC: 102 MMOL/L — SIGNIFICANT CHANGE UP (ref 98–107)
CO2 SERPL-SCNC: 19 MMOL/L — LOW (ref 22–31)
CREAT SERPL-MCNC: 2.31 MG/DL — HIGH (ref 0.5–1.3)
GLUCOSE SERPL-MCNC: 141 MG/DL — HIGH (ref 70–99)
HCT VFR BLD CALC: 28.8 % — LOW (ref 34.5–45)
HGB BLD-MCNC: 8.7 G/DL — LOW (ref 11.5–15.5)
MAGNESIUM SERPL-MCNC: 2.7 MG/DL — HIGH (ref 1.6–2.6)
MCHC RBC-ENTMCNC: 28.7 PG — SIGNIFICANT CHANGE UP (ref 27–34)
MCHC RBC-ENTMCNC: 30.2 % — LOW (ref 32–36)
MCV RBC AUTO: 95 FL — SIGNIFICANT CHANGE UP (ref 80–100)
NRBC # FLD: 0.03 K/UL — SIGNIFICANT CHANGE UP (ref 0–0)
PLATELET # BLD AUTO: 393 K/UL — SIGNIFICANT CHANGE UP (ref 150–400)
PMV BLD: 8.9 FL — SIGNIFICANT CHANGE UP (ref 7–13)
POTASSIUM SERPL-MCNC: 4.3 MMOL/L — SIGNIFICANT CHANGE UP (ref 3.5–5.3)
POTASSIUM SERPL-SCNC: 4.3 MMOL/L — SIGNIFICANT CHANGE UP (ref 3.5–5.3)
RBC # BLD: 3.03 M/UL — LOW (ref 3.8–5.2)
RBC # FLD: 16.3 % — HIGH (ref 10.3–14.5)
SODIUM SERPL-SCNC: 139 MMOL/L — SIGNIFICANT CHANGE UP (ref 135–145)
WBC # BLD: 12.15 K/UL — HIGH (ref 3.8–10.5)
WBC # FLD AUTO: 12.15 K/UL — HIGH (ref 3.8–10.5)

## 2019-05-04 PROCEDURE — 99233 SBSQ HOSP IP/OBS HIGH 50: CPT

## 2019-05-04 RX ORDER — ERGOCALCIFEROL 1.25 MG/1
50000 CAPSULE ORAL
Qty: 0 | Refills: 0 | Status: DISCONTINUED | OUTPATIENT
Start: 2019-05-04 | End: 2019-05-16

## 2019-05-04 RX ADMIN — Medication 1 SPRAY(S): at 13:29

## 2019-05-04 RX ADMIN — Medication 30 UNIT(S): at 09:13

## 2019-05-04 RX ADMIN — GABAPENTIN 100 MILLIGRAM(S): 400 CAPSULE ORAL at 17:47

## 2019-05-04 RX ADMIN — Medication 100 MILLIGRAM(S): at 06:28

## 2019-05-04 RX ADMIN — Medication 30 UNIT(S): at 17:47

## 2019-05-04 RX ADMIN — Medication 37.5 MILLIGRAM(S): at 13:28

## 2019-05-04 RX ADMIN — ISOSORBIDE DINITRATE 10 MILLIGRAM(S): 5 TABLET ORAL at 06:33

## 2019-05-04 RX ADMIN — Medication 3 MILLILITER(S): at 10:29

## 2019-05-04 RX ADMIN — HEPARIN SODIUM 5000 UNIT(S): 5000 INJECTION INTRAVENOUS; SUBCUTANEOUS at 22:20

## 2019-05-04 RX ADMIN — BUDESONIDE AND FORMOTEROL FUMARATE DIHYDRATE 2 PUFF(S): 160; 4.5 AEROSOL RESPIRATORY (INHALATION) at 22:22

## 2019-05-04 RX ADMIN — ISOSORBIDE DINITRATE 10 MILLIGRAM(S): 5 TABLET ORAL at 22:20

## 2019-05-04 RX ADMIN — Medication 81 MILLIGRAM(S): at 13:28

## 2019-05-04 RX ADMIN — Medication 650 MILLIGRAM(S): at 11:00

## 2019-05-04 RX ADMIN — Medication 20 MILLIGRAM(S): at 06:30

## 2019-05-04 RX ADMIN — CHLORHEXIDINE GLUCONATE 1 APPLICATION(S): 213 SOLUTION TOPICAL at 13:28

## 2019-05-04 RX ADMIN — INSULIN GLARGINE 65 UNIT(S): 100 INJECTION, SOLUTION SUBCUTANEOUS at 22:20

## 2019-05-04 RX ADMIN — Medication 3: at 17:47

## 2019-05-04 RX ADMIN — PANTOPRAZOLE SODIUM 40 MILLIGRAM(S): 20 TABLET, DELAYED RELEASE ORAL at 06:29

## 2019-05-04 RX ADMIN — Medication 3 MILLILITER(S): at 16:55

## 2019-05-04 RX ADMIN — HEPARIN SODIUM 5000 UNIT(S): 5000 INJECTION INTRAVENOUS; SUBCUTANEOUS at 06:29

## 2019-05-04 RX ADMIN — Medication 100 MILLIGRAM(S): at 17:46

## 2019-05-04 RX ADMIN — Medication 50 MICROGRAM(S): at 06:29

## 2019-05-04 RX ADMIN — Medication 30 UNIT(S): at 13:27

## 2019-05-04 RX ADMIN — BUDESONIDE AND FORMOTEROL FUMARATE DIHYDRATE 2 PUFF(S): 160; 4.5 AEROSOL RESPIRATORY (INHALATION) at 09:13

## 2019-05-04 RX ADMIN — ERGOCALCIFEROL 50000 UNIT(S): 1.25 CAPSULE ORAL at 17:58

## 2019-05-04 RX ADMIN — Medication 3 MILLILITER(S): at 23:21

## 2019-05-04 RX ADMIN — TICAGRELOR 90 MILLIGRAM(S): 90 TABLET ORAL at 06:30

## 2019-05-04 RX ADMIN — Medication 1 SPRAY(S): at 22:21

## 2019-05-04 RX ADMIN — BUMETANIDE 2 MILLIGRAM(S): 0.25 INJECTION INTRAMUSCULAR; INTRAVENOUS at 06:28

## 2019-05-04 RX ADMIN — Medication 1 SPRAY(S): at 06:30

## 2019-05-04 RX ADMIN — ISOSORBIDE DINITRATE 10 MILLIGRAM(S): 5 TABLET ORAL at 13:29

## 2019-05-04 RX ADMIN — Medication 3 MILLILITER(S): at 04:18

## 2019-05-04 RX ADMIN — Medication 37.5 MILLIGRAM(S): at 22:20

## 2019-05-04 RX ADMIN — SENNA PLUS 2 TABLET(S): 8.6 TABLET ORAL at 22:20

## 2019-05-04 RX ADMIN — POLYETHYLENE GLYCOL 3350 17 GRAM(S): 17 POWDER, FOR SOLUTION ORAL at 17:46

## 2019-05-04 RX ADMIN — TICAGRELOR 90 MILLIGRAM(S): 90 TABLET ORAL at 17:47

## 2019-05-04 RX ADMIN — Medication 650 MILLIGRAM(S): at 11:53

## 2019-05-04 RX ADMIN — Medication 37.5 MILLIGRAM(S): at 06:30

## 2019-05-04 RX ADMIN — Medication 1 SPRAY(S): at 02:49

## 2019-05-04 RX ADMIN — Medication 2: at 13:27

## 2019-05-04 RX ADMIN — Medication 10 MILLIGRAM(S): at 17:48

## 2019-05-04 RX ADMIN — GABAPENTIN 100 MILLIGRAM(S): 400 CAPSULE ORAL at 06:29

## 2019-05-04 RX ADMIN — SENNA PLUS 2 TABLET(S): 8.6 TABLET ORAL at 02:48

## 2019-05-04 RX ADMIN — HEPARIN SODIUM 5000 UNIT(S): 5000 INJECTION INTRAVENOUS; SUBCUTANEOUS at 13:28

## 2019-05-04 RX ADMIN — ATORVASTATIN CALCIUM 40 MILLIGRAM(S): 80 TABLET, FILM COATED ORAL at 22:21

## 2019-05-04 RX ADMIN — MONTELUKAST 10 MILLIGRAM(S): 4 TABLET, CHEWABLE ORAL at 13:28

## 2019-05-04 RX ADMIN — POLYETHYLENE GLYCOL 3350 17 GRAM(S): 17 POWDER, FOR SOLUTION ORAL at 06:30

## 2019-05-04 NOTE — PROGRESS NOTE ADULT - ATTENDING COMMENTS
AS ABOVE;  : pt was started empirically on levofloxacin for pneumonia . doubt it: she does have mild leukocytosis but afebrile: CT scan suggestive of air trappin/30: today she was started on steroids for gout attack: Her breathing seems to be better: AAWITING anjali:  : breathing wise she seems to be doing ok : Has severe MR: with PFO with left to right shunt:  5/3: for cts evaluation!  :NO SURGERY PER FAMILY

## 2019-05-04 NOTE — PROGRESS NOTE ADULT - SUBJECTIVE AND OBJECTIVE BOX
Patient is a 73y old  Female who presents with a chief complaint of sob (04 May 2019 13:15)      INTERVAL HPI/OVERNIGHT EVENTS:  T(C): 36.6 (05-04-19 @ 13:10), Max: 36.6 (05-04-19 @ 13:10)  HR: 88 (05-04-19 @ 16:56) (86 - 95)  BP: 112/55 (05-04-19 @ 13:10) (105/60 - 114/60)  RR: 18 (05-04-19 @ 13:10) (18 - 18)  SpO2: 98% (05-04-19 @ 16:56) (98% - 100%)  Wt(kg): --  I&O's Summary    03 May 2019 07:01  -  04 May 2019 07:00  --------------------------------------------------------  IN: 360 mL / OUT: 850 mL / NET: -490 mL    04 May 2019 07:01  -  04 May 2019 19:03  --------------------------------------------------------  IN: 400 mL / OUT: 1700 mL / NET: -1300 mL        LABS:                        8.7    12.15 )-----------( 393      ( 04 May 2019 06:22 )             28.8     05-04    139  |  102  |  81<H>  ----------------------------<  141<H>  4.3   |  19<L>  |  2.31<H>    Ca    9.5      04 May 2019 06:22  Phos  3.7     05-03  Mg     2.7     05-04    TPro  7.6  /  Alb  3.8  /  TBili  0.3  /  DBili  x   /  AST  25  /  ALT  20  /  AlkPhos  62  05-03        CAPILLARY BLOOD GLUCOSE      POCT Blood Glucose.: 273 mg/dL (04 May 2019 17:44)  POCT Blood Glucose.: 250 mg/dL (04 May 2019 12:37)  POCT Blood Glucose.: 140 mg/dL (04 May 2019 08:48)  POCT Blood Glucose.: 143 mg/dL (03 May 2019 21:46)            MEDICATIONS  (STANDING):  ALBUTerol/ipratropium for Nebulization 3 milliLiter(s) Nebulizer every 6 hours  aspirin enteric coated 81 milliGRAM(s) Oral daily  atorvastatin 40 milliGRAM(s) Oral at bedtime  buDESOnide 160 MICROgram(s)/formoterol 4.5 MICROgram(s) Inhaler 2 Puff(s) Inhalation two times a day  buMETAnide 2 milliGRAM(s) Oral daily  chlorhexidine 4% Liquid 1 Application(s) Topical <User Schedule>  dextrose 5%. 1000 milliLiter(s) (50 mL/Hr) IV Continuous <Continuous>  dextrose 50% Injectable 12.5 Gram(s) IV Push once  dextrose 50% Injectable 25 Gram(s) IV Push once  dextrose 50% Injectable 25 Gram(s) IV Push once  docusate sodium 100 milliGRAM(s) Oral two times a day  ergocalciferol 03515 Unit(s) Oral <User Schedule>  gabapentin 100 milliGRAM(s) Oral two times a day  heparin  Injectable 5000 Unit(s) SubCutaneous every 8 hours  hydrALAZINE 37.5 milliGRAM(s) Oral three times a day  influenza   Vaccine 0.5 milliLiter(s) IntraMuscular once  insulin glargine Injectable (LANTUS) 65 Unit(s) SubCutaneous at bedtime  insulin lispro (HumaLOG) corrective regimen sliding scale   SubCutaneous three times a day before meals  insulin lispro (HumaLOG) corrective regimen sliding scale   SubCutaneous at bedtime  insulin lispro Injectable (HumaLOG) 30 Unit(s) SubCutaneous three times a day before meals  isosorbide   dinitrate Tablet (ISORDIL) 10 milliGRAM(s) Oral three times a day  levothyroxine 50 MICROGram(s) Oral daily  methylPREDNISolone sodium succinate Injectable 20 milliGRAM(s) IV Push daily  montelukast 10 milliGRAM(s) Oral daily  pantoprazole    Tablet 40 milliGRAM(s) Oral before breakfast  polyethylene glycol 3350 17 Gram(s) Oral two times a day  senna 2 Tablet(s) Oral at bedtime  sodium chloride 0.65% Nasal 1 Spray(s) Both Nostrils three times a day  ticagrelor 90 milliGRAM(s) Oral two times a day    MEDICATIONS  (PRN):  acetaminophen   Tablet .. 650 milliGRAM(s) Oral every 6 hours PRN Mild Pain (1 - 3), Moderate Pain (4 - 6), Severe Pain (7 - 10)  ALBUTerol/ipratropium for Nebulization. 3 milliLiter(s) Nebulizer once PRN Shortness of Breath  dextrose 40% Gel 15 Gram(s) Oral once PRN Blood Glucose LESS THAN 70 milliGRAM(s)/deciliter  diphenhydrAMINE 25 milliGRAM(s) Oral every 4 hours PRN Rash and/or Itching  glucagon  Injectable 1 milliGRAM(s) IntraMuscular once PRN Glucose LESS THAN 70 milligrams/deciliter  ondansetron Injectable 4 milliGRAM(s) IV Push once PRN Nausea and/or Vomiting          PHYSICAL EXAM:  GENERAL: NAD, well-groomed, well-developed  HEAD:  Atraumatic, Normocephalic  CHEST/LUNG: Clear to percussion bilaterally; No rales, rhonchi, wheezing, or rubs  HEART: Regular rate and rhythm; No murmurs, rubs, or gallops  ABDOMEN: Soft, Nontender, Nondistended; Bowel sounds present  EXTREMITIES:  2+ Peripheral Pulses, No clubbing, cyanosis, or edema  LYMPH: No lymphadenopathy noted  SKIN: No rashes or lesions    Care Discussed with Consultants/Other Providers [ ] YES  [ ] NO

## 2019-05-04 NOTE — PROGRESS NOTE ADULT - ASSESSMENT
73YOF w/o h/o CAD, s/p CABG CHF adm to [facility] for Acute on Chronic systolic and diastolic HF

## 2019-05-04 NOTE — PROGRESS NOTE ADULT - ASSESSMENT
72 YO F w/o h/o CAD, s/p CABG Systolic CHF, CKD 3b, who p/w sob and pino.    - Acute on Chronic Systolic CHF Exacerbation, and Severe Mitral Valve Regurgitation:      - severe systolic dysfunction with severe MVR      - consider further evaluation for Mitral Valve repair/replacement      - c/w cardiac meds, (-) endocarditis       - cardio/CCU management greatly appreciated       - tele     - NSTEMI:      - Continue asa/brilinta/statin      - adjust management prn      - tele    -  Acute Gout:      - start solumedrol 20 iv  for 3 days       - MERVIN on CKD      - stable. trend renal function.      - optimize comorbidities. 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

## 2019-05-04 NOTE — PROGRESS NOTE ADULT - ASSESSMENT
73YOF with hx of CAD s/p CABG, hypothyroidism, CKD, CHF, HTN, DM p/w worsening ZEE and sob. Patient usually can walk up a few steps before feeling sob, currently feeling sob after walking from living room to kitchen.  She is using 3 pillows to sleep. No cough, fevers. (+) CP, SS and constant, She experienced more ZEE for last two days.  She has no edema in lower extremity.  Last admission to [facility] was 11/18. (24 Apr 2019 10:21)    ER vss.  Pt afebrile.  WBC 8.7 --> 10.7.  UA (-), RVP (-).  4/25 cxr with L hazy retrocardiac opacity.   Pt found to have NSTEMI and gross fluid overload, started on IV lasix.  She is pending [facility].        ID consult called for further abx managment and evaluation for pna.       Recommend:    - Pt with ZEE/SOB, (+) NSTEMI.  Cxr with L hazy opacity.  Pt w/o cough or fever to suggest pna on clinical basis.  CT chest shows mosaic attentuation/pneumonitis/atypical infection.       - Azithro d/c'd due to allergic reaction. Pt completed 7 day course of levaquin  for possible superimposed pna. Completed on 5/2.       - Pt transferred to CCU, and back to telemetry,  for continued managment of acute decompensated CHF, now on telemetry.   TTE with severe LV dysfunction, severe MR and PH.   LV shows ischemic MR.     No acute ID issues at this time.  Pt c/o recurrence of pain in Left foot.  s/p steroid course and colchicine for gout.  Will defer to primary team - f/u with Rheumatology    Joselin Roman  [phone]

## 2019-05-04 NOTE — PROGRESS NOTE ADULT - SUBJECTIVE AND OBJECTIVE BOX
Infectious Diseases progress note:    Subjective: Pt c/o pain on dorsum of left foot that is worse today.  No fever/chills/erythema.  Family member at bedside.      ROS:  CONSTITUTIONAL:  No fever, chills, rigors  CARDIOVASCULAR:  No chest pain or palpitations  RESPIRATORY:   No SOB, cough, dyspnea on exertion.  No wheezing  GASTROINTESTINAL:  No abd pain, N/V, diarrhea/constipation  EXTREMITIES:  No swelling or joint pain  GENITOURINARY:  No burning on urination, increased frequency or urgency.  No flank pain  NEUROLOGIC:  No HA, visual disturbances  SKIN: No rashes    Allergies    azithromycin (Hives; Pruritus)    Intolerances        ANTIBIOTICS/RELEVANT:  antimicrobials    immunologic:  influenza   Vaccine 0.5 milliLiter(s) IntraMuscular once    OTHER:  acetaminophen   Tablet .. 650 milliGRAM(s) Oral every 6 hours PRN  ALBUTerol/ipratropium for Nebulization 3 milliLiter(s) Nebulizer every 6 hours  ALBUTerol/ipratropium for Nebulization. 3 milliLiter(s) Nebulizer once PRN  aspirin enteric coated 81 milliGRAM(s) Oral daily  atorvastatin 40 milliGRAM(s) Oral at bedtime  bisacodyl Suppository 10 milliGRAM(s) Rectal once  buDESOnide 160 MICROgram(s)/formoterol 4.5 MICROgram(s) Inhaler 2 Puff(s) Inhalation two times a day  buMETAnide 2 milliGRAM(s) Oral daily  chlorhexidine 4% Liquid 1 Application(s) Topical <User Schedule>  dextrose 40% Gel 15 Gram(s) Oral once PRN  dextrose 5%. 1000 milliLiter(s) IV Continuous <Continuous>  dextrose 50% Injectable 12.5 Gram(s) IV Push once  dextrose 50% Injectable 25 Gram(s) IV Push once  dextrose 50% Injectable 25 Gram(s) IV Push once  diphenhydrAMINE 25 milliGRAM(s) Oral every 4 hours PRN  docusate sodium 100 milliGRAM(s) Oral two times a day  ergocalciferol 92044 Unit(s) Oral <User Schedule>  gabapentin 100 milliGRAM(s) Oral two times a day  glucagon  Injectable 1 milliGRAM(s) IntraMuscular once PRN  heparin  Injectable 5000 Unit(s) SubCutaneous every 8 hours  hydrALAZINE 37.5 milliGRAM(s) Oral three times a day  insulin glargine Injectable (LANTUS) 65 Unit(s) SubCutaneous at bedtime  insulin lispro (HumaLOG) corrective regimen sliding scale   SubCutaneous three times a day before meals  insulin lispro (HumaLOG) corrective regimen sliding scale   SubCutaneous at bedtime  insulin lispro Injectable (HumaLOG) 30 Unit(s) SubCutaneous three times a day before meals  isosorbide   dinitrate Tablet (ISORDIL) 10 milliGRAM(s) Oral three times a day  levothyroxine 50 MICROGram(s) Oral daily  montelukast 10 milliGRAM(s) Oral daily  ondansetron Injectable 4 milliGRAM(s) IV Push once PRN  pantoprazole    Tablet 40 milliGRAM(s) Oral before breakfast  polyethylene glycol 3350 17 Gram(s) Oral two times a day  senna 2 Tablet(s) Oral at bedtime  sodium chloride 0.65% Nasal 1 Spray(s) Both Nostrils three times a day  ticagrelor 90 milliGRAM(s) Oral two times a day      Objective:  Vital Signs Last 24 Hrs  T(C): 36.3 (04 May 2019 06:27), Max: 36.7 (03 May 2019 16:07)  T(F): 97.4 (04 May 2019 06:27), Max: 98 (03 May 2019 16:07)  HR: 90 (04 May 2019 10:29) (86 - 96)  BP: 105/60 (04 May 2019 06:27) (105/60 - 118/66)  BP(mean): --  RR: 18 (04 May 2019 06:27) (18 - 18)  SpO2: 98% (04 May 2019 10:29) (98% - 100%)    PHYSICAL EXAM:  Constitutional:NAD  Eyes:MOHSEN, EOMI  Ear/Nose/Throat: no thrush, mucositis.  Moist mucous membranes	  Neck:no JVD, no lymphadenopathy, supple  Respiratory: CTA maribel  Cardiovascular: S1S2 RRR, no murmurs  Gastrointestinal:soft, nontender,  nondistended (+) BS  Extremities:no e/e/c  Skin:  no rashes, open wounds or ulcerations        LABS:                        8.7    12.15 )-----------( 393      ( 04 May 2019 06:22 )             28.8     05-04    139  |  102  |  81<H>  ----------------------------<  141<H>  4.3   |  19<L>  |  2.31<H>    Ca    9.5      04 May 2019 06:22  Phos  3.7     05-03  Mg     2.7     05-04    TPro  7.6  /  Alb  3.8  /  TBili  0.3  /  DBili  x   /  AST  25  /  ALT  20  /  AlkPhos  62  05-03          Procalcitonin, Serum: 0.13 (04-26 @ 11:36)                    MICROBIOLOGY:          RADIOLOGY & ADDITIONAL STUDIES:

## 2019-05-04 NOTE — PROGRESS NOTE ADULT - ASSESSMENT
73YOF with hx of CAD s/p CABG, hypothyroidism, CKD, CHF, HTN, DM p/w worsening ZEE and sob.    A/p  MERVIN  Renal function slightly worsen today  diuretics increase to bumex 2mg po daily. patient still has sob with minimum exertion. will need to continue diuretic.  monitor bmp  avoid nephrotoxic agents  **If pt planned for cardiac cath , pt is 26% risk of developing NGHIA and 1.09% risk of requiring RRT. In view of fluid overload status, no IVF prior to cath. Recommend to hold morning dose of lasix on the day of procedure    CKD stage 3  baseline cr 1.5-1.8  likely sec to HTN/DM/chf  elevated PTH, vit d insufficieny, start vit d 50000 x 4 dose   phos optimal    CHF  monitor daily weight and i/o  diuretics per cardio  f/u cardio 73YOF with hx of CAD s/p CABG, hypothyroidism, CKD, CHF, HTN, DM p/w worsening ZEE and sob.    A/p  MERVIN  Etiology?  Likely sec to CHF  Renal function slightly worsen today  diuretics increase to bumex 2mg po daily. patient still has sob with minimum exertion. will need to continue diuretic.  monitor bmp  avoid nephrotoxic agents  **If pt planned for cardiac cath , pt is 26% risk of developing NGHIA and 1.09% risk of requiring RRT. In view of fluid overload status, no IVF prior to cath. Recommend to hold morning dose of lasix on the day of procedure    CKD stage 3  baseline cr 1.5-1.8  likely sec to HTN/DM/chf  elevated PTH, vit d insufficieny, start vit d 50000 x 4 dose   phos optimal    CHF  monitor daily weight and i/o  diuretics per cardio  f/u cardio

## 2019-05-04 NOTE — PROGRESS NOTE ADULT - PROBLEM SELECTOR PLAN 2
chest pain is stable. on dual antiplatelet therapy. management per cardiology   per cardiology some point heart cath  : per cardiology : she is on diuretics!  : has severe MR for anjali in AM  : ANJALI reviewed: has severe MR:  5/3: cts evalaution pendin/4: Cont chastitytmilind rx:

## 2019-05-04 NOTE — CHART NOTE - NSCHARTNOTEFT_GEN_A_CORE
Pt complaining of persistent foot pain, discussed with Dr. Lopez, will give 3 days of 20mg IV Solumedrol per attending recommendations for gout flare.

## 2019-05-04 NOTE — PROGRESS NOTE ADULT - SUBJECTIVE AND OBJECTIVE BOX
Patient is a 73y old  Female who presents with a chief complaint of sob (04 May 2019 13:06)      Any change in ROS: family does nto want surgery : pt is doing ok : SOB in exertion: mild    MEDICATIONS  (STANDING):  ALBUTerol/ipratropium for Nebulization 3 milliLiter(s) Nebulizer every 6 hours  aspirin enteric coated 81 milliGRAM(s) Oral daily  atorvastatin 40 milliGRAM(s) Oral at bedtime  bisacodyl Suppository 10 milliGRAM(s) Rectal once  buDESOnide 160 MICROgram(s)/formoterol 4.5 MICROgram(s) Inhaler 2 Puff(s) Inhalation two times a day  buMETAnide 2 milliGRAM(s) Oral daily  chlorhexidine 4% Liquid 1 Application(s) Topical <User Schedule>  dextrose 5%. 1000 milliLiter(s) (50 mL/Hr) IV Continuous <Continuous>  dextrose 50% Injectable 12.5 Gram(s) IV Push once  dextrose 50% Injectable 25 Gram(s) IV Push once  dextrose 50% Injectable 25 Gram(s) IV Push once  docusate sodium 100 milliGRAM(s) Oral two times a day  ergocalciferol 32593 Unit(s) Oral <User Schedule>  gabapentin 100 milliGRAM(s) Oral two times a day  heparin  Injectable 5000 Unit(s) SubCutaneous every 8 hours  hydrALAZINE 37.5 milliGRAM(s) Oral three times a day  influenza   Vaccine 0.5 milliLiter(s) IntraMuscular once  insulin glargine Injectable (LANTUS) 65 Unit(s) SubCutaneous at bedtime  insulin lispro (HumaLOG) corrective regimen sliding scale   SubCutaneous three times a day before meals  insulin lispro (HumaLOG) corrective regimen sliding scale   SubCutaneous at bedtime  insulin lispro Injectable (HumaLOG) 30 Unit(s) SubCutaneous three times a day before meals  isosorbide   dinitrate Tablet (ISORDIL) 10 milliGRAM(s) Oral three times a day  levothyroxine 50 MICROGram(s) Oral daily  montelukast 10 milliGRAM(s) Oral daily  pantoprazole    Tablet 40 milliGRAM(s) Oral before breakfast  polyethylene glycol 3350 17 Gram(s) Oral two times a day  senna 2 Tablet(s) Oral at bedtime  sodium chloride 0.65% Nasal 1 Spray(s) Both Nostrils three times a day  ticagrelor 90 milliGRAM(s) Oral two times a day    MEDICATIONS  (PRN):  acetaminophen   Tablet .. 650 milliGRAM(s) Oral every 6 hours PRN Mild Pain (1 - 3), Moderate Pain (4 - 6), Severe Pain (7 - 10)  ALBUTerol/ipratropium for Nebulization. 3 milliLiter(s) Nebulizer once PRN Shortness of Breath  dextrose 40% Gel 15 Gram(s) Oral once PRN Blood Glucose LESS THAN 70 milliGRAM(s)/deciliter  diphenhydrAMINE 25 milliGRAM(s) Oral every 4 hours PRN Rash and/or Itching  glucagon  Injectable 1 milliGRAM(s) IntraMuscular once PRN Glucose LESS THAN 70 milligrams/deciliter  ondansetron Injectable 4 milliGRAM(s) IV Push once PRN Nausea and/or Vomiting    Vital Signs Last 24 Hrs  T(C): 36.3 (04 May 2019 06:27), Max: 36.7 (03 May 2019 16:07)  T(F): 97.4 (04 May 2019 06:27), Max: 98 (03 May 2019 16:07)  HR: 90 (04 May 2019 10:29) (86 - 95)  BP: 105/60 (04 May 2019 06:27) (105/60 - 114/60)  BP(mean): --  RR: 18 (04 May 2019 06:27) (18 - 18)  SpO2: 98% (04 May 2019 10:29) (98% - 100%)    I&O's Summary    03 May 2019 07:01  -  04 May 2019 07:00  --------------------------------------------------------  IN: 360 mL / OUT: 850 mL / NET: -490 mL    04 May 2019 07:01  -  04 May 2019 13:15  --------------------------------------------------------  IN: 0 mL / OUT: 950 mL / NET: -950 mL          Physical Exam:   GENERAL: NAD, well-groomed, well-developed  HEENT: MOHSEN/   Atraumatic, Normocephalic  ENMT: No tonsillar erythema, exudates, or enlargement; Moist mucous membranes, Good dentition, No lesions  NECK: Supple, No JVD, Normal thyroid  CHEST/LUNG: Scattered crackles+  CVS: Regular rate and rhythm; No murmurs, rubs, or gallops  GI: : Soft, Nontender, Nondistended; Bowel sounds present  NERVOUS SYSTEM:  Alert & Oriented X3  EXTREMITIES:  2+ Peripheral Pulses, No clubbing, cyanosis, or edema  LYMPH: No lymphadenopathy noted  SKIN: No rashes or lesions  ENDOCRINOLOGY: No Thyromegaly  PSYCH: Appropriate    Labs:                              8.7    12.15 )-----------( 393      ( 04 May 2019 06:22 )             28.8                         8.5    13.67 )-----------( 395      ( 03 May 2019 06:20 )             27.6                         8.7    12.82 )-----------( 367      ( 02 May 2019 05:15 )             28.0                         9.0    13.11 )-----------( 365      ( 01 May 2019 04:00 )             29.3     05-04    139  |  102  |  81<H>  ----------------------------<  141<H>  4.3   |  19<L>  |  2.31<H>  05-03    141  |  104  |  80<H>  ----------------------------<  158<H>  4.3   |  20<L>  |  2.28<H>  05-02    138  |  102  |  89<H>  ----------------------------<  238<H>  5.1   |  18<L>  |  2.38<H>  05-02    142  |  104  |  83<H>  ----------------------------<  218<H>  4.2   |  18<L>  |  2.35<H>  05-01    137  |  99  |  73<H>  ----------------------------<  250<H>  4.2   |  20<L>  |  2.26<H>  05-01    138  |  100  |  74<H>  ----------------------------<  236<H>  5.6<H>   |  19<L>  |  2.13<H>  04-30    140  |  100  |  67<H>  ----------------------------<  133<H>  4.7   |  22  |  2.19<H>    Ca    9.5      04 May 2019 06:22  Ca    9.4      03 May 2019 06:20  Ca    9.4      02 May 2019 17:53  Phos  3.7     05-03  Phos  4.0     05-02  Mg     2.7     05-04  Mg     2.8     05-03  Mg     2.8     05-02    TPro  7.6  /  Alb  3.8  /  TBili  0.3  /  DBili  x   /  AST  25  /  ALT  20  /  AlkPhos  62  05-03    CAPILLARY BLOOD GLUCOSE      POCT Blood Glucose.: 250 mg/dL (04 May 2019 12:37)  POCT Blood Glucose.: 140 mg/dL (04 May 2019 08:48)  POCT Blood Glucose.: 143 mg/dL (03 May 2019 21:46)  POCT Blood Glucose.: 135 mg/dL (03 May 2019 17:44)      LIVER FUNCTIONS - ( 03 May 2019 06:20 )  Alb: 3.8 g/dL / Pro: 7.6 g/dL / ALK PHOS: 62 u/L / ALT: 20 u/L / AST: 25 u/L / GGT: x             < from: Xray Chest 1 View- PORTABLE-Urgent (05.02.19 @ 10:47) >    EXAM:  XR CHEST PORTABLE URGENT 1V        PROCEDURE DATE:  May  2 2019         INTERPRETATION:    Portable chest xray: Shortness of Breath    Comparison: Most recent prior chest radiograph from 4/25/2019.    FINDINGS:    Lines/Tubes: None.    Heartand mediastinum:  Median sternotomy wires, mediastinal surgical   clips, and coronary stent.    Lungs, pleura, and airways: No focal pulmonary consolidation. No pleural   effusion or pneumothorax.    Bones and soft tissues: The bones and soft tissuesare unchanged.    Impression:    Clear lungs.              STACY HERNANDEZ M.D., RADIOLOGY RESIDENT  This document has been electronically signed.  EMIL BRUNO M.D., ATTENDING RADIOLOGIST  This document has been electronically signed. May  2 2019 12:05PM        < end of copied text >          RECENT CULTURES:        RESPIRATORY CULTURES:          Studies  Chest X-RAY  CT SCAN Chest   Venous Dopplers: LE:   CT Abdomen  Others

## 2019-05-04 NOTE — PROGRESS NOTE ADULT - SUBJECTIVE AND OBJECTIVE BOX
[facility] [place] [facility]   MD RAHEEL SHAH MD ANGELA WONG, PA    TEL:  OFFICE: [phone]  DR SANTOYO CELL: [phone]  DR. NGUYEN CELL: [phone]  UMM KENDALL CELL: [phone]        Patient is a 73y old  Female who presents with a chief complaint of sob (03 May 2019 16:56)      Patient seen and examined at bedside.+ ZEE     VITALS:  T(F): 97.4 (05-04-19 @ 06:27), Max: 98 (05-03-19 @ 16:07)  HR: 90 (05-04-19 @ 10:29)  BP: 105/60 (05-04-19 @ 06:27)  RR: 18 (05-04-19 @ 06:27)  SpO2: 98% (05-04-19 @ 10:29)  Wt(kg): --    05-03 @ 07:01  -  05-04 @ 07:00  --------------------------------------------------------  IN: 360 mL / OUT: 850 mL / NET: -490 mL    05-04 @ 07:01  -  05-04 @ 12:04  --------------------------------------------------------  IN: 0 mL / OUT: 950 mL / NET: -950 mL          PHYSICAL EXAM:  Constitutional: NAD  Neck: No JVD  Respiratory: CTAB, no wheezes, rales or rhonchi  Cardiovascular: S1, S2, RRR  Gastrointestinal: BS+, soft, NT/ND  Extremities: No peripheral edema    Hospital Medications:   MEDICATIONS  (STANDING):  ALBUTerol/ipratropium for Nebulization 3 milliLiter(s) Nebulizer every 6 hours  aspirin enteric coated 81 milliGRAM(s) Oral daily  atorvastatin 40 milliGRAM(s) Oral at bedtime  bisacodyl Suppository 10 milliGRAM(s) Rectal once  buDESOnide 160 MICROgram(s)/formoterol 4.5 MICROgram(s) Inhaler 2 Puff(s) Inhalation two times a day  buMETAnide 2 milliGRAM(s) Oral daily  chlorhexidine 4% Liquid 1 Application(s) Topical <User Schedule>  dextrose 5%. 1000 milliLiter(s) (50 mL/Hr) IV Continuous <Continuous>  dextrose 50% Injectable 12.5 Gram(s) IV Push once  dextrose 50% Injectable 25 Gram(s) IV Push once  dextrose 50% Injectable 25 Gram(s) IV Push once  docusate sodium 100 milliGRAM(s) Oral two times a day  gabapentin 100 milliGRAM(s) Oral two times a day  heparin  Injectable 5000 Unit(s) SubCutaneous every 8 hours  hydrALAZINE 37.5 milliGRAM(s) Oral three times a day  influenza   Vaccine 0.5 milliLiter(s) IntraMuscular once  insulin glargine Injectable (LANTUS) 65 Unit(s) SubCutaneous at bedtime  insulin lispro (HumaLOG) corrective regimen sliding scale   SubCutaneous three times a day before meals  insulin lispro (HumaLOG) corrective regimen sliding scale   SubCutaneous at bedtime  insulin lispro Injectable (HumaLOG) 30 Unit(s) SubCutaneous three times a day before meals  isosorbide   dinitrate Tablet (ISORDIL) 10 milliGRAM(s) Oral three times a day  levothyroxine 50 MICROGram(s) Oral daily  montelukast 10 milliGRAM(s) Oral daily  pantoprazole    Tablet 40 milliGRAM(s) Oral before breakfast  polyethylene glycol 3350 17 Gram(s) Oral two times a day  senna 2 Tablet(s) Oral at bedtime  sodium chloride 0.65% Nasal 1 Spray(s) Both Nostrils three times a day  ticagrelor 90 milliGRAM(s) Oral two times a day      LABS:  05-04    139  |  102  |  81<H>  ----------------------------<  141<H>  4.3   |  19<L>  |  2.31<H>    Ca    9.5      04 May 2019 06:22  Phos  3.7     05-03  Mg     2.7     05-04    TPro  7.6  /  Alb  3.8  /  TBili  0.3  /  DBili      /  AST  25  /  ALT  20  /  AlkPhos  62  05-03    Creatinine Trend: 2.31 <--, 2.28 <--, 2.38 <--, 2.35 <--, 2.26 <--, 2.13 <--, 2.19 <--, 2.08 <--, 2.06 <--, 2.09 <--, 2.03 <--, 2.02 <--, 1.98 <--, 2.06 <--                                8.7    12.15 )-----------( 393      ( 04 May 2019 06:22 )             28.8     Urine Studies:  Urinalysis - [04-25-19 @ 09:30]      Color LIGHT YELLOW / Appearance CLEAR / SG 1.011 / pH 6.0      Gluc 70 / Ketone NEGATIVE  / Bili NEGATIVE / Urobili NORMAL       Blood NEGATIVE / Protein NEGATIVE / Leuk Est NEGATIVE / Nitrite NEGATIVE      RBC  / WBC  / Hyaline  / Gran  / Sq Epi  / Non Sq Epi  / Bacteria     Urine Creatinine 49.70      [05-02-19 @ 11:18]  Urine Sodium 46      [05-02-19 @ 11:18]  Urine Urea Nitrogen 770.0      [05-02-19 @ 11:18]  Urine Osmolality 422      [05-02-19 @ 11:18]    .4 (Ca --)      [04-25-19 @ 05:45]   --  PTH 43.55 (Ca --)      [09-10-18 @ 07:15]   --  PTH 36.60 (Ca --)      [09-08-18 @ 03:15]   --  Vitamin D (25OH) 25.9      [05-01-19 @ 04:00]  HbA1c 7.3      [04-25-19 @ 05:45]  TSH 1.59      [04-25-19 @ 05:45]  Lipid: chol 127, , HDL 22, LDL 67      [04-24-19 @ 12:21]    HCV 0.06, Nonreactive Hepatitis C AB  S/CO Ratio                        Interpretation  < 1.00                                   Non-Reactive  1.00 - 4.99                         Weakly-Reactive  > 5.00                                Reactive  Non-Reactive: A person with a non-reactive HCV antibody  result is considered uninfected.  No further action is  needed unless recent infection is suspected.  In these  cases, consider repeat testing later to detect  seroconversion..  Weakly-Reactive: HCV antibody test is abnormal, HCV RNA  Qualitative test will follow.  Reactive: HCV antibody test is abnormal, HCV RNA  Qualitative test will follow.  Note: HCV antibody testing is performed on the [employer]   system.      [04-25-19 @ 05:45]      RADIOLOGY & ADDITIONAL STUDIES:

## 2019-05-04 NOTE — PROGRESS NOTE ADULT - PROBLEM SELECTOR PLAN 1
to me ct scan looks more like air trapping: SHE IS NOT WHEEZING: BUT DON'T HAVE HER pft : WOULD SUGGEST TO STARTS SYMBICORT AS WELL AS SINGULAIR Daily: doubt pneumonia:   4/30: she seems to eb doing better: cont symbicort as wellas singulair:  5/2: She has been stable: no SOB : being diuresed: Has severe MR with PFO with left to rigth flow: Cont BD  5/3: maris antibtiocs now: cont diuresis: for cts evaluation for alonso clip: Cont BD with some evidence of air trapping on ct chest1  5/4: no surgery per family : conservative management:

## 2019-05-05 LAB
ANION GAP SERPL CALC-SCNC: 16 MMO/L — HIGH (ref 7–14)
BUN SERPL-MCNC: 83 MG/DL — HIGH (ref 7–23)
CALCIUM SERPL-MCNC: 9.8 MG/DL — SIGNIFICANT CHANGE UP (ref 8.4–10.5)
CHLORIDE SERPL-SCNC: 103 MMOL/L — SIGNIFICANT CHANGE UP (ref 98–107)
CO2 SERPL-SCNC: 18 MMOL/L — LOW (ref 22–31)
CREAT SERPL-MCNC: 2.34 MG/DL — HIGH (ref 0.5–1.3)
GLUCOSE SERPL-MCNC: 167 MG/DL — HIGH (ref 70–99)
HCT VFR BLD CALC: 28.8 % — LOW (ref 34.5–45)
HGB BLD-MCNC: 8.8 G/DL — LOW (ref 11.5–15.5)
MAGNESIUM SERPL-MCNC: 2.6 MG/DL — SIGNIFICANT CHANGE UP (ref 1.6–2.6)
MCHC RBC-ENTMCNC: 28.6 PG — SIGNIFICANT CHANGE UP (ref 27–34)
MCHC RBC-ENTMCNC: 30.6 % — LOW (ref 32–36)
MCV RBC AUTO: 93.5 FL — SIGNIFICANT CHANGE UP (ref 80–100)
NRBC # FLD: 0.02 K/UL — SIGNIFICANT CHANGE UP (ref 0–0)
PLATELET # BLD AUTO: 440 K/UL — HIGH (ref 150–400)
PMV BLD: 8.9 FL — SIGNIFICANT CHANGE UP (ref 7–13)
POTASSIUM SERPL-MCNC: 4 MMOL/L — SIGNIFICANT CHANGE UP (ref 3.5–5.3)
POTASSIUM SERPL-SCNC: 4 MMOL/L — SIGNIFICANT CHANGE UP (ref 3.5–5.3)
RBC # BLD: 3.08 M/UL — LOW (ref 3.8–5.2)
RBC # FLD: 16.5 % — HIGH (ref 10.3–14.5)
SODIUM SERPL-SCNC: 137 MMOL/L — SIGNIFICANT CHANGE UP (ref 135–145)
WBC # BLD: 14.86 K/UL — HIGH (ref 3.8–10.5)
WBC # FLD AUTO: 14.86 K/UL — HIGH (ref 3.8–10.5)

## 2019-05-05 PROCEDURE — 99233 SBSQ HOSP IP/OBS HIGH 50: CPT

## 2019-05-05 PROCEDURE — 93010 ELECTROCARDIOGRAM REPORT: CPT

## 2019-05-05 RX ORDER — METOPROLOL TARTRATE 50 MG
5 TABLET ORAL ONCE
Qty: 0 | Refills: 0 | Status: COMPLETED | OUTPATIENT
Start: 2019-05-05 | End: 2019-05-05

## 2019-05-05 RX ADMIN — CHLORHEXIDINE GLUCONATE 1 APPLICATION(S): 213 SOLUTION TOPICAL at 12:23

## 2019-05-05 RX ADMIN — Medication 3 MILLILITER(S): at 22:51

## 2019-05-05 RX ADMIN — HEPARIN SODIUM 5000 UNIT(S): 5000 INJECTION INTRAVENOUS; SUBCUTANEOUS at 22:35

## 2019-05-05 RX ADMIN — Medication 30 UNIT(S): at 18:28

## 2019-05-05 RX ADMIN — Medication 1 SPRAY(S): at 05:55

## 2019-05-05 RX ADMIN — BUMETANIDE 2 MILLIGRAM(S): 0.25 INJECTION INTRAMUSCULAR; INTRAVENOUS at 05:57

## 2019-05-05 RX ADMIN — BUDESONIDE AND FORMOTEROL FUMARATE DIHYDRATE 2 PUFF(S): 160; 4.5 AEROSOL RESPIRATORY (INHALATION) at 21:51

## 2019-05-05 RX ADMIN — PANTOPRAZOLE SODIUM 40 MILLIGRAM(S): 20 TABLET, DELAYED RELEASE ORAL at 06:01

## 2019-05-05 RX ADMIN — TICAGRELOR 90 MILLIGRAM(S): 90 TABLET ORAL at 05:58

## 2019-05-05 RX ADMIN — MONTELUKAST 10 MILLIGRAM(S): 4 TABLET, CHEWABLE ORAL at 12:24

## 2019-05-05 RX ADMIN — GABAPENTIN 100 MILLIGRAM(S): 400 CAPSULE ORAL at 05:56

## 2019-05-05 RX ADMIN — GABAPENTIN 100 MILLIGRAM(S): 400 CAPSULE ORAL at 18:28

## 2019-05-05 RX ADMIN — Medication 100 MILLIGRAM(S): at 18:28

## 2019-05-05 RX ADMIN — Medication 30 UNIT(S): at 09:02

## 2019-05-05 RX ADMIN — Medication 650 MILLIGRAM(S): at 14:16

## 2019-05-05 RX ADMIN — Medication 20 MILLIGRAM(S): at 05:56

## 2019-05-05 RX ADMIN — INSULIN GLARGINE 65 UNIT(S): 100 INJECTION, SOLUTION SUBCUTANEOUS at 22:36

## 2019-05-05 RX ADMIN — ATORVASTATIN CALCIUM 40 MILLIGRAM(S): 80 TABLET, FILM COATED ORAL at 22:01

## 2019-05-05 RX ADMIN — ISOSORBIDE DINITRATE 10 MILLIGRAM(S): 5 TABLET ORAL at 21:52

## 2019-05-05 RX ADMIN — Medication 3: at 13:28

## 2019-05-05 RX ADMIN — Medication 81 MILLIGRAM(S): at 12:23

## 2019-05-05 RX ADMIN — Medication 5 MILLIGRAM(S): at 01:47

## 2019-05-05 RX ADMIN — ISOSORBIDE DINITRATE 10 MILLIGRAM(S): 5 TABLET ORAL at 05:57

## 2019-05-05 RX ADMIN — Medication 3 MILLILITER(S): at 10:43

## 2019-05-05 RX ADMIN — POLYETHYLENE GLYCOL 3350 17 GRAM(S): 17 POWDER, FOR SOLUTION ORAL at 05:58

## 2019-05-05 RX ADMIN — ISOSORBIDE DINITRATE 10 MILLIGRAM(S): 5 TABLET ORAL at 13:29

## 2019-05-05 RX ADMIN — POLYETHYLENE GLYCOL 3350 17 GRAM(S): 17 POWDER, FOR SOLUTION ORAL at 18:28

## 2019-05-05 RX ADMIN — HEPARIN SODIUM 5000 UNIT(S): 5000 INJECTION INTRAVENOUS; SUBCUTANEOUS at 13:29

## 2019-05-05 RX ADMIN — SENNA PLUS 2 TABLET(S): 8.6 TABLET ORAL at 21:51

## 2019-05-05 RX ADMIN — BUDESONIDE AND FORMOTEROL FUMARATE DIHYDRATE 2 PUFF(S): 160; 4.5 AEROSOL RESPIRATORY (INHALATION) at 09:02

## 2019-05-05 RX ADMIN — Medication 1 SPRAY(S): at 21:51

## 2019-05-05 RX ADMIN — Medication 3 MILLILITER(S): at 16:49

## 2019-05-05 RX ADMIN — HEPARIN SODIUM 5000 UNIT(S): 5000 INJECTION INTRAVENOUS; SUBCUTANEOUS at 05:56

## 2019-05-05 RX ADMIN — Medication 1 SPRAY(S): at 13:30

## 2019-05-05 RX ADMIN — TICAGRELOR 90 MILLIGRAM(S): 90 TABLET ORAL at 18:28

## 2019-05-05 RX ADMIN — Medication 50 MICROGRAM(S): at 05:57

## 2019-05-05 RX ADMIN — Medication 37.5 MILLIGRAM(S): at 13:29

## 2019-05-05 RX ADMIN — Medication 30 UNIT(S): at 13:29

## 2019-05-05 RX ADMIN — Medication 37.5 MILLIGRAM(S): at 21:52

## 2019-05-05 RX ADMIN — Medication 100 MILLIGRAM(S): at 05:57

## 2019-05-05 RX ADMIN — Medication 1: at 09:02

## 2019-05-05 RX ADMIN — Medication 37.5 MILLIGRAM(S): at 05:57

## 2019-05-05 RX ADMIN — Medication 3 MILLILITER(S): at 04:38

## 2019-05-05 RX ADMIN — Medication 650 MILLIGRAM(S): at 15:15

## 2019-05-05 NOTE — PROGRESS NOTE ADULT - SUBJECTIVE AND OBJECTIVE BOX
Patient is a 73y old  Female who presents with a chief complaint of sob (04 May 2019 19:03)      INTERVAL HPI/OVERNIGHT EVENTS:  T(C): 36.4 (05-05-19 @ 05:22), Max: 36.6 (05-04-19 @ 13:10)  HR: 93 (05-05-19 @ 05:22) (79 - 161)  BP: 103/48 (05-05-19 @ 05:22) (88/45 - 112/55)  RR: 18 (05-05-19 @ 05:22) (18 - 21)  SpO2: 100% (05-05-19 @ 05:22) (98% - 100%)  Wt(kg): --  I&O's Summary    04 May 2019 07:01  -  05 May 2019 07:00  --------------------------------------------------------  IN: 680 mL / OUT: 3200 mL / NET: -2520 mL        LABS:                        8.7    12.15 )-----------( 393      ( 04 May 2019 06:22 )             28.8     05-04    139  |  102  |  81<H>  ----------------------------<  141<H>  4.3   |  19<L>  |  2.31<H>    Ca    9.5      04 May 2019 06:22  Mg     2.7     05-04          CAPILLARY BLOOD GLUCOSE      POCT Blood Glucose.: 125 mg/dL (04 May 2019 22:15)  POCT Blood Glucose.: 273 mg/dL (04 May 2019 17:44)  POCT Blood Glucose.: 250 mg/dL (04 May 2019 12:37)  POCT Blood Glucose.: 140 mg/dL (04 May 2019 08:48)            MEDICATIONS  (STANDING):  ALBUTerol/ipratropium for Nebulization 3 milliLiter(s) Nebulizer every 6 hours  aspirin enteric coated 81 milliGRAM(s) Oral daily  atorvastatin 40 milliGRAM(s) Oral at bedtime  buDESOnide 160 MICROgram(s)/formoterol 4.5 MICROgram(s) Inhaler 2 Puff(s) Inhalation two times a day  buMETAnide 2 milliGRAM(s) Oral daily  chlorhexidine 4% Liquid 1 Application(s) Topical <User Schedule>  dextrose 5%. 1000 milliLiter(s) (50 mL/Hr) IV Continuous <Continuous>  dextrose 50% Injectable 12.5 Gram(s) IV Push once  dextrose 50% Injectable 25 Gram(s) IV Push once  dextrose 50% Injectable 25 Gram(s) IV Push once  docusate sodium 100 milliGRAM(s) Oral two times a day  ergocalciferol 24263 Unit(s) Oral <User Schedule>  gabapentin 100 milliGRAM(s) Oral two times a day  heparin  Injectable 5000 Unit(s) SubCutaneous every 8 hours  hydrALAZINE 37.5 milliGRAM(s) Oral three times a day  influenza   Vaccine 0.5 milliLiter(s) IntraMuscular once  insulin glargine Injectable (LANTUS) 65 Unit(s) SubCutaneous at bedtime  insulin lispro (HumaLOG) corrective regimen sliding scale   SubCutaneous three times a day before meals  insulin lispro (HumaLOG) corrective regimen sliding scale   SubCutaneous at bedtime  insulin lispro Injectable (HumaLOG) 30 Unit(s) SubCutaneous three times a day before meals  isosorbide   dinitrate Tablet (ISORDIL) 10 milliGRAM(s) Oral three times a day  levothyroxine 50 MICROGram(s) Oral daily  methylPREDNISolone sodium succinate Injectable 20 milliGRAM(s) IV Push daily  montelukast 10 milliGRAM(s) Oral daily  pantoprazole    Tablet 40 milliGRAM(s) Oral before breakfast  polyethylene glycol 3350 17 Gram(s) Oral two times a day  senna 2 Tablet(s) Oral at bedtime  sodium chloride 0.65% Nasal 1 Spray(s) Both Nostrils three times a day  ticagrelor 90 milliGRAM(s) Oral two times a day    MEDICATIONS  (PRN):  acetaminophen   Tablet .. 650 milliGRAM(s) Oral every 6 hours PRN Mild Pain (1 - 3), Moderate Pain (4 - 6), Severe Pain (7 - 10)  ALBUTerol/ipratropium for Nebulization. 3 milliLiter(s) Nebulizer once PRN Shortness of Breath  dextrose 40% Gel 15 Gram(s) Oral once PRN Blood Glucose LESS THAN 70 milliGRAM(s)/deciliter  diphenhydrAMINE 25 milliGRAM(s) Oral every 4 hours PRN Rash and/or Itching  glucagon  Injectable 1 milliGRAM(s) IntraMuscular once PRN Glucose LESS THAN 70 milligrams/deciliter  ondansetron Injectable 4 milliGRAM(s) IV Push once PRN Nausea and/or Vomiting          PHYSICAL EXAM:  GENERAL: NAD, well-groomed, well-developed  HEAD:  Atraumatic, Normocephalic  CHEST/LUNG: Clear to percussion bilaterally; No rales, rhonchi, wheezing, or rubs  HEART: Regular rate and rhythm; No murmurs, rubs, or gallops  ABDOMEN: Soft, Nontender, Nondistended; Bowel sounds present  EXTREMITIES:  2+ Peripheral Pulses, No clubbing, cyanosis, or edema  LYMPH: No lymphadenopathy noted  SKIN: No rashes or lesions    Care Discussed with Consultants/Other Providers [ ] YES  [ ] NO

## 2019-05-05 NOTE — PROGRESS NOTE ADULT - SUBJECTIVE AND OBJECTIVE BOX
24H hour events:   no acute events overnight  no complaints, but does appear to have increased work of breathing  tele: SR 90s  MEDICATIONS:  aspirin enteric coated 81 milliGRAM(s) Oral daily  buMETAnide 2 milliGRAM(s) Oral daily  heparin  Injectable 5000 Unit(s) SubCutaneous every 8 hours  hydrALAZINE 37.5 milliGRAM(s) Oral three times a day  isosorbide   dinitrate Tablet (ISORDIL) 10 milliGRAM(s) Oral three times a day  ticagrelor 90 milliGRAM(s) Oral two times a day      ALBUTerol/ipratropium for Nebulization 3 milliLiter(s) Nebulizer every 6 hours  ALBUTerol/ipratropium for Nebulization. 3 milliLiter(s) Nebulizer once PRN  buDESOnide 160 MICROgram(s)/formoterol 4.5 MICROgram(s) Inhaler 2 Puff(s) Inhalation two times a day  montelukast 10 milliGRAM(s) Oral daily    acetaminophen   Tablet .. 650 milliGRAM(s) Oral every 6 hours PRN  diphenhydrAMINE 25 milliGRAM(s) Oral every 4 hours PRN  gabapentin 100 milliGRAM(s) Oral two times a day  ondansetron Injectable 4 milliGRAM(s) IV Push once PRN    docusate sodium 100 milliGRAM(s) Oral two times a day  pantoprazole    Tablet 40 milliGRAM(s) Oral before breakfast  polyethylene glycol 3350 17 Gram(s) Oral two times a day  senna 2 Tablet(s) Oral at bedtime    atorvastatin 40 milliGRAM(s) Oral at bedtime  dextrose 40% Gel 15 Gram(s) Oral once PRN  dextrose 50% Injectable 12.5 Gram(s) IV Push once  dextrose 50% Injectable 25 Gram(s) IV Push once  dextrose 50% Injectable 25 Gram(s) IV Push once  glucagon  Injectable 1 milliGRAM(s) IntraMuscular once PRN  insulin glargine Injectable (LANTUS) 65 Unit(s) SubCutaneous at bedtime  insulin lispro (HumaLOG) corrective regimen sliding scale   SubCutaneous three times a day before meals  insulin lispro (HumaLOG) corrective regimen sliding scale   SubCutaneous at bedtime  insulin lispro Injectable (HumaLOG) 30 Unit(s) SubCutaneous three times a day before meals  levothyroxine 50 MICROGram(s) Oral daily  methylPREDNISolone sodium succinate Injectable 20 milliGRAM(s) IV Push daily    chlorhexidine 4% Liquid 1 Application(s) Topical <User Schedule>  dextrose 5%. 1000 milliLiter(s) IV Continuous <Continuous>  ergocalciferol 63089 Unit(s) Oral <User Schedule>  influenza   Vaccine 0.5 milliLiter(s) IntraMuscular once  sodium chloride 0.65% Nasal 1 Spray(s) Both Nostrils three times a day          PHYSICAL EXAM:  T(C): 36.4 (05-05-19 @ 05:22), Max: 36.6 (05-04-19 @ 13:10)  HR: 93 (05-05-19 @ 05:22) (79 - 161)  BP: 103/48 (05-05-19 @ 05:22) (88/45 - 112/55)  RR: 18 (05-05-19 @ 05:22) (18 - 21)  SpO2: 100% (05-05-19 @ 05:22) (98% - 100%)  Wt(kg): --  I&O's Summary    04 May 2019 07:01  -  05 May 2019 07:00  --------------------------------------------------------  IN: 680 mL / OUT: 3200 mL / NET: -2520 mL        Appearance: Normal	  HEENT:   Normal oral mucosa, PERRL, EOMI	  Lymphatic: No lymphadenopathy  Cardiovascular: Normal S1 S2, No JVD, No murmurs, No edema  Respiratory: ctab	  Psychiatry: A & O x 3, Mood & affect appropriate  Gastrointestinal:  Soft, Non-tender, + BS	  Skin: No rashes, No ecchymoses, No cyanosis	  Neurologic: Non-focal  Extremities: Normal range of motion, No clubbing, cyanosis       LABS:	 	    CBC Full  -  ( 05 May 2019 06:30 )  WBC Count : 14.86 K/uL  Hemoglobin : 8.8 g/dL  Hematocrit : 28.8 %  Platelet Count - Automated : 440 K/uL  Mean Cell Volume : 93.5 fL  Mean Cell Hemoglobin : 28.6 pg  Mean Cell Hemoglobin Concentration : 30.6 %  Auto Neutrophil # : x  Auto Lymphocyte # : x  Auto Monocyte # : x  Auto Eosinophil # : x  Auto Basophil # : x  Auto Neutrophil % : x  Auto Lymphocyte % : x  Auto Monocyte % : x  Auto Eosinophil % : x  Auto Basophil % : x    05-05    137  |  103  |  83<H>  ----------------------------<  167<H>  4.0   |  18<L>  |  2.34<H>  05-04    139  |  102  |  81<H>  ----------------------------<  141<H>  4.3   |  19<L>  |  2.31<H>    Ca    9.8      05 May 2019 07:30  Ca    9.5      04 May 2019 06:22  Mg     2.6     05-05  Mg     2.7     05-04        proBNP:   Lipid Profile:   HgA1c:   TSH:       CARDIAC MARKERS:            TELEMETRY: 	    ECG:  	  RADIOLOGY:  OTHER: 	    PREVIOUS DIAGNOSTIC TESTING:    [ ] Echocardiogram:  [ ]  Catheterization:  [ ] Stress Test:  	  	  ASSESSMENT/PLAN:

## 2019-05-05 NOTE — PROGRESS NOTE ADULT - PROBLEM SELECTOR PLAN 1
to me ct scan looks more like air trapping: SHE IS NOT WHEEZING: BUT DON'T HAVE HER pft : WOULD SUGGEST TO STARTS SYMBICORT AS WELL AS SINGULAIR Daily: doubt pneumonia:   4/30: she seems to eb doing better: cont symbicort as wellas singulair:  5/2: She has been stable: no SOB : being diuresed: Has severe MR with PFO with left to rigth flow: Cont BD  5/3: maris antibtiocs now: cont diuresis: for cts evaluation for alonso clip: Cont BD with some evidence of air trapping on ct chest1  5/4: no surgery per family : conservative management:  5/5: stable: no SOB at rest

## 2019-05-05 NOTE — PROGRESS NOTE ADULT - SUBJECTIVE AND OBJECTIVE BOX
Patient seen and examined at bedside. No chest pain/sob    VITALS:  T(F): 97.5 (05-05-19 @ 05:22), Max: 97.9 (05-04-19 @ 13:10)  HR: 93 (05-05-19 @ 05:22)  BP: 103/48 (05-05-19 @ 05:22)  RR: 18 (05-05-19 @ 05:22)  SpO2: 100% (05-05-19 @ 05:22)  Wt(kg): --    05-04 @ 07:01  -  05-05 @ 07:00  --------------------------------------------------------  IN: 680 mL / OUT: 3200 mL / NET: -2520 mL          PHYSICAL EXAM:  Constitutional: NAD  Neck: No JVD  Respiratory: CTAB, no wheezes, rales or rhonchi  Cardiovascular: S1, S2, RRR  Gastrointestinal: BS+, soft, NT/ND  Extremities: No peripheral edema    Hospital Medications:   MEDICATIONS  (STANDING):  ALBUTerol/ipratropium for Nebulization 3 milliLiter(s) Nebulizer every 6 hours  aspirin enteric coated 81 milliGRAM(s) Oral daily  atorvastatin 40 milliGRAM(s) Oral at bedtime  buDESOnide 160 MICROgram(s)/formoterol 4.5 MICROgram(s) Inhaler 2 Puff(s) Inhalation two times a day  buMETAnide 2 milliGRAM(s) Oral daily  chlorhexidine 4% Liquid 1 Application(s) Topical <User Schedule>  dextrose 5%. 1000 milliLiter(s) (50 mL/Hr) IV Continuous <Continuous>  dextrose 50% Injectable 12.5 Gram(s) IV Push once  dextrose 50% Injectable 25 Gram(s) IV Push once  dextrose 50% Injectable 25 Gram(s) IV Push once  docusate sodium 100 milliGRAM(s) Oral two times a day  ergocalciferol 02505 Unit(s) Oral <User Schedule>  gabapentin 100 milliGRAM(s) Oral two times a day  heparin  Injectable 5000 Unit(s) SubCutaneous every 8 hours  hydrALAZINE 37.5 milliGRAM(s) Oral three times a day  influenza   Vaccine 0.5 milliLiter(s) IntraMuscular once  insulin glargine Injectable (LANTUS) 65 Unit(s) SubCutaneous at bedtime  insulin lispro (HumaLOG) corrective regimen sliding scale   SubCutaneous three times a day before meals  insulin lispro (HumaLOG) corrective regimen sliding scale   SubCutaneous at bedtime  insulin lispro Injectable (HumaLOG) 30 Unit(s) SubCutaneous three times a day before meals  isosorbide   dinitrate Tablet (ISORDIL) 10 milliGRAM(s) Oral three times a day  levothyroxine 50 MICROGram(s) Oral daily  methylPREDNISolone sodium succinate Injectable 20 milliGRAM(s) IV Push daily  montelukast 10 milliGRAM(s) Oral daily  pantoprazole    Tablet 40 milliGRAM(s) Oral before breakfast  polyethylene glycol 3350 17 Gram(s) Oral two times a day  senna 2 Tablet(s) Oral at bedtime  sodium chloride 0.65% Nasal 1 Spray(s) Both Nostrils three times a day  ticagrelor 90 milliGRAM(s) Oral two times a day      LABS:  05-05    137  |  103  |  83<H>  ----------------------------<  167<H>  4.0   |  18<L>  |  2.34<H>    Ca    9.8      05 May 2019 07:30  Mg     2.6     05-05      Creatinine Trend: 2.34 <--, 2.31 <--, 2.28 <--, 2.38 <--, 2.35 <--, 2.26 <--, 2.13 <--, 2.19 <--, 2.08 <--, 2.06 <--, 2.09 <--, 2.03 <--, 2.02 <--                              8.8    14.86 )-----------( 440      ( 05 May 2019 06:30 )             28.8     Urine Studies:  Osmolality, Random Urine: 422 mosmo/kg (05-02 @ 11:18)  Sodium, Random Urine: 46 mmol/L (05-02 @ 11:18)  Creatinine, Random Urine: 49.70 mg/dL (05-02 @ 11:18)    Osmolality, Random Urine: 422 mosmo/kg (05-02 @ 11:18)  Sodium, Random Urine: 46 mmol/L (05-02 @ 11:18)  Creatinine, Random Urine: 49.70 mg/dL (05-02 @ 11:18)    Urinalysis - [04-25-19 @ 09:30]      Color LIGHT YELLOW / Appearance CLEAR / SG 1.011 / pH 6.0      Gluc 70 / Ketone NEGATIVE  / Bili NEGATIVE / Urobili NORMAL       Blood NEGATIVE / Protein NEGATIVE / Leuk Est NEGATIVE / Nitrite NEGATIVE      RBC  / WBC  / Hyaline  / Gran  / Sq Epi  / Non Sq Epi  / Bacteria     Urine Creatinine 49.70      [05-02-19 @ 11:18]  Urine Sodium 46      [05-02-19 @ 11:18]  Urine Urea Nitrogen 770.0      [05-02-19 @ 11:18]  Urine Osmolality 422      [05-02-19 @ 11:18]    .4 (Ca --)      [04-25-19 @ 05:45]   --  PTH 43.55 (Ca --)      [09-10-18 @ 07:15]   --  PTH 36.60 (Ca --)      [09-08-18 @ 03:15]   --  Vitamin D (25OH) 25.9      [05-01-19 @ 04:00]  HbA1c 7.3      [04-25-19 @ 05:45]  TSH 1.59      [04-25-19 @ 05:45]  Lipid: chol 127, , HDL 22, LDL 67      [04-24-19 @ 12:21]    HCV 0.06, Nonreactive Hepatitis C AB  S/CO Ratio                        Interpretation  < 1.00                                   Non-Reactive  1.00 - 4.99                         Weakly-Reactive  > 5.00                                Reactive  Non-Reactive: A person with a non-reactive HCV antibody  result is considered uninfected.  No further action is  needed unless recent infection is suspected.  In these  cases, consider repeat testing later to detect  seroconversion..  Weakly-Reactive: HCV antibody test is abnormal, HCV RNA  Qualitative test will follow.  Reactive: HCV antibody test is abnormal, HCV RNA  Qualitative test will follow.  Note: HCV antibody testing is performed on the [employer]   system.      [04-25-19 @ 05:45]      RADIOLOGY & ADDITIONAL STUDIES:

## 2019-05-05 NOTE — CHART NOTE - NSCHARTNOTEFT_GEN_A_CORE
Notified by RN earlier patient with onset of 10/10 neck pain , shortness of breath and chest pressure. Family member at bedside to help translate.  Pain improved after patient repositioned in bed VSS . pain became 5/10 once repositioned   On exam - lungs with no wheezing or rhonchi   CVS : S1,s2   ext no tenderness . L side of neck pain with palpation and passive ROM   EKG performed with no acute changes     pain improved following hot packs, tylenol . Now chest pain free, no shortness of breath .   On O2 repositioned in bed  continue to monitor closely .

## 2019-05-05 NOTE — PROGRESS NOTE ADULT - ASSESSMENT
72 YO F w/o h/o CAD, s/p CABG Systolic CHF, CKD 3b, who p/w sob and pino.    - Acute on Chronic Systolic CHF Exacerbation, and Severe Mitral Valve Regurgitation:      - severe systolic dysfunction with severe MVR      - consider further evaluation for Mitral Valve repair/replacement      - c/w cardiac meds, (-) endocarditis       - cardio/CCU management greatly appreciated       - tele     - NSTEMI:      - Continue asa/brilinta/statin      - adjust management prn      - tele    -  Acute Gout:      - start solumedrol 20 iv  for 3 days       - MERVIN on CKD      - stable. trend renal function.      - optimize comorbidities. Avoid nephrotoxic rx     - DM II with long term complications of hyperglycemia and nephropathy   - c/w dm rx, as above    - monitor FS closely given hypoglycemia       - encourage Po intake

## 2019-05-05 NOTE — PROGRESS NOTE ADULT - ASSESSMENT
74 y/o female with h/o HTN, DM, CAD s/p CABG (w/ prior PCI to Ramus; LIMA-LAD patent), HFrEF (LVEF 25%, LVEDD 5.2 cm), likely mod-severe MR, severe pulm HTN comes in with cough and SOB. CT chest showed ground glass opacities, patient was started on IV zithromax, developed rash, now on Levaquin. She was admitted to telemetry was given a dose of lopressor, patient became hypotensive and went into respiratory distress, patient was then moved to CCU. HF was consulted to help manage this patient who is SOB and is hypotensive. Diuresed with improvement. Developed b/l feet pain (likely gout), improving with prednisone. On exam, NAD, JVP approx 10-12 cm with minimal HJR, abdom nontender, no pedal edema, WWP. Labs reviewed. EKG reviewed. TTE - EF 25%, LVEDD 5.2 cm, severe MR (likely ischemic). LV c/w severe ischemic MR 2/2 posterior leaflet tethering. Overall stage C HF, NYHA class IV w/ possible pulmonary edema 2/2 ischemic MR  - cont bumex to 2 mg daily   - cont hydral 37.5 mg q8h (hold SBP <90)  - continue isordil 10 mg q8h  - mitral clip eval under consideration .

## 2019-05-05 NOTE — PROGRESS NOTE ADULT - PROBLEM SELECTOR PLAN 2
chest pain is stable. on dual antiplatelet therapy. management per cardiology   per cardiology some point heart cath  : per cardiology : she is on diuretics!  : has severe MR for anjali in AM  : ANJALI reviewed: has severe MR:  5/3: cts evalaution pendin/4: Cont currtn rx:  : cont rx:

## 2019-05-05 NOTE — PROGRESS NOTE ADULT - ASSESSMENT
73YOF with hx of CAD s/p CABG, hypothyroidism, CKD, CHF, HTN, DM p/w worsening ZEE and sob.    A/p  MERVIN  Etiology?  Likely sec to CHF  Renal function is stable  On bumex 2mg po daily. Continue diuretic. No SOB observed or verbalized by patient at this time  monitor bmp  avoid nephrotoxic agents  **If pt planned for cardiac cath , pt is 26% risk of developing NGHIA and 1.09% risk of requiring RRT. In view of fluid overload status, no IVF prior to cath. Recommend to hold morning dose if on lasix on the day of procedure    CKD stage 3  baseline cr 1.5-1.8  likely sec to HTN/DM/chf  elevated PTH, vit d insufficieny, start vit d 50000 x 4 dose   phos optimal    CHF  monitor daily weight and i/o  diuretics per cardio  f/u cardio 73YOF with hx of CAD s/p CABG, hypothyroidism, CKD, CHF, HTN, DM p/w worsening ZEE and sob.    A/p  MERVIN  Etiology?  Likely sec to CHF  Renal function is stable  On bumex 2mg po daily. Continue diuretic. No SOB observed or verbalized by patient at this time  monitor bmp  avoid nephrotoxic agents  **If pt planned for cardiac cath , pt is 26% risk of developing NGHIA and 1.09% risk of requiring RRT. In view of fluid overload status, no IVF prior to cath. Recommend to hold morning dose of on lasix on the day of procedure    CKD stage 3  baseline cr 1.5-1.8  likely sec to HTN/DM/chf  elevated PTH, vit d insufficieny, continue vit d 51215 QW x 4 dose   phos optimal    CHF  monitor daily weight and i/o  diuretics per cardio  f/u cardio

## 2019-05-05 NOTE — PROGRESS NOTE ADULT - ATTENDING COMMENTS
AS ABOVE;  : pt was started empirically on levofloxacin for pneumonia . doubt it: she does have mild leukocytosis but afebrile: CT scan suggestive of air trappin/30: today she was started on steroids for gout attack: Her breathing seems to be better: AAWITING anjali:  : breathing wise she seems to be doing ok : Has severe MR: with PFO with left to right shunt:  5/3: for cts evaluation!  :NO SURGERY PER FAMILY  : ON STEROIDS FOR ACUTE GOUT FLARE:

## 2019-05-05 NOTE — PROGRESS NOTE ADULT - SUBJECTIVE AND OBJECTIVE BOX
Patient is a 73y old  Female who presents with a chief complaint of sob (05 May 2019 10:49)      Any change in ROS: Doing better: no SOB : no cough :       MEDICATIONS  (STANDING):  ALBUTerol/ipratropium for Nebulization 3 milliLiter(s) Nebulizer every 6 hours  aspirin enteric coated 81 milliGRAM(s) Oral daily  atorvastatin 40 milliGRAM(s) Oral at bedtime  buDESOnide 160 MICROgram(s)/formoterol 4.5 MICROgram(s) Inhaler 2 Puff(s) Inhalation two times a day  buMETAnide 2 milliGRAM(s) Oral daily  chlorhexidine 4% Liquid 1 Application(s) Topical <User Schedule>  dextrose 5%. 1000 milliLiter(s) (50 mL/Hr) IV Continuous <Continuous>  dextrose 50% Injectable 12.5 Gram(s) IV Push once  dextrose 50% Injectable 25 Gram(s) IV Push once  dextrose 50% Injectable 25 Gram(s) IV Push once  docusate sodium 100 milliGRAM(s) Oral two times a day  ergocalciferol 65075 Unit(s) Oral <User Schedule>  gabapentin 100 milliGRAM(s) Oral two times a day  heparin  Injectable 5000 Unit(s) SubCutaneous every 8 hours  hydrALAZINE 37.5 milliGRAM(s) Oral three times a day  influenza   Vaccine 0.5 milliLiter(s) IntraMuscular once  insulin glargine Injectable (LANTUS) 65 Unit(s) SubCutaneous at bedtime  insulin lispro (HumaLOG) corrective regimen sliding scale   SubCutaneous three times a day before meals  insulin lispro (HumaLOG) corrective regimen sliding scale   SubCutaneous at bedtime  insulin lispro Injectable (HumaLOG) 30 Unit(s) SubCutaneous three times a day before meals  isosorbide   dinitrate Tablet (ISORDIL) 10 milliGRAM(s) Oral three times a day  levothyroxine 50 MICROGram(s) Oral daily  methylPREDNISolone sodium succinate Injectable 20 milliGRAM(s) IV Push daily  montelukast 10 milliGRAM(s) Oral daily  pantoprazole    Tablet 40 milliGRAM(s) Oral before breakfast  polyethylene glycol 3350 17 Gram(s) Oral two times a day  senna 2 Tablet(s) Oral at bedtime  sodium chloride 0.65% Nasal 1 Spray(s) Both Nostrils three times a day  ticagrelor 90 milliGRAM(s) Oral two times a day    MEDICATIONS  (PRN):  acetaminophen   Tablet .. 650 milliGRAM(s) Oral every 6 hours PRN Mild Pain (1 - 3), Moderate Pain (4 - 6), Severe Pain (7 - 10)  ALBUTerol/ipratropium for Nebulization. 3 milliLiter(s) Nebulizer once PRN Shortness of Breath  dextrose 40% Gel 15 Gram(s) Oral once PRN Blood Glucose LESS THAN 70 milliGRAM(s)/deciliter  diphenhydrAMINE 25 milliGRAM(s) Oral every 4 hours PRN Rash and/or Itching  glucagon  Injectable 1 milliGRAM(s) IntraMuscular once PRN Glucose LESS THAN 70 milligrams/deciliter  ondansetron Injectable 4 milliGRAM(s) IV Push once PRN Nausea and/or Vomiting    Vital Signs Last 24 Hrs  T(C): 36.5 (05 May 2019 13:26), Max: 36.6 (05 May 2019 12:21)  T(F): 97.7 (05 May 2019 13:26), Max: 97.8 (05 May 2019 12:21)  HR: 92 (05 May 2019 13:26) (79 - 161)  BP: 107/48 (05 May 2019 13:26) (88/45 - 107/48)  BP(mean): --  RR: 16 (05 May 2019 13:26) (16 - 21)  SpO2: 100% (05 May 2019 13:26) (98% - 100%)    I&O's Summary    04 May 2019 07:01  -  05 May 2019 07:00  --------------------------------------------------------  IN: 680 mL / OUT: 3200 mL / NET: -2520 mL          Physical Exam:   GENERAL: NAD, well-groomed, well-developed  HEENT: MOHSEN/   Atraumatic, Normocephalic  ENMT: No tonsillar erythema, exudates, or enlargement; Moist mucous membranes, Good dentition, No lesions  NECK: Supple, No JVD, Normal thyroid  CHEST/LUNG: poor air entry   CVS: Regular rate and rhythm; No murmurs, rubs, or gallops  GI: : Soft, Nontender, Nondistended; Bowel sounds present  NERVOUS SYSTEM:  Alert & Oriented X3  EXTREMITIES:  2+ Peripheral Pulses, No clubbing, cyanosis, or edema  LYMPH: No lymphadenopathy noted  SKIN: No rashes or lesions  ENDOCRINOLOGY: No Thyromegaly  PSYCH: Appropriate    Labs:                              8.8    14.86 )-----------( 440      ( 05 May 2019 06:30 )             28.8                         8.7    12.15 )-----------( 393      ( 04 May 2019 06:22 )             28.8                         8.5    13.67 )-----------( 395      ( 03 May 2019 06:20 )             27.6                         8.7    12.82 )-----------( 367      ( 02 May 2019 05:15 )             28.0     05-05    137  |  103  |  83<H>  ----------------------------<  167<H>  4.0   |  18<L>  |  2.34<H>  05-04    139  |  102  |  81<H>  ----------------------------<  141<H>  4.3   |  19<L>  |  2.31<H>  05-03    141  |  104  |  80<H>  ----------------------------<  158<H>  4.3   |  20<L>  |  2.28<H>  05-02    138  |  102  |  89<H>  ----------------------------<  238<H>  5.1   |  18<L>  |  2.38<H>  05-02    142  |  104  |  83<H>  ----------------------------<  218<H>  4.2   |  18<L>  |  2.35<H>    Ca    9.8      05 May 2019 07:30  Ca    9.5      04 May 2019 06:22  Mg     2.6     05-05  Mg     2.7     05-04    TPro  7.6  /  Alb  3.8  /  TBili  0.3  /  DBili  x   /  AST  25  /  ALT  20  /  AlkPhos  62  05-03    CAPILLARY BLOOD GLUCOSE      POCT Blood Glucose.: 196 mg/dL (05 May 2019 08:41)  POCT Blood Glucose.: 125 mg/dL (04 May 2019 22:15)  POCT Blood Glucose.: 273 mg/dL (04 May 2019 17:44)      < from: Xray Chest 1 View- PORTABLE-Urgent (05.02.19 @ 10:47) >    EXAM:  XR CHEST PORTABLE URGENT 1V        PROCEDURE DATE:  May  2 2019         INTERPRETATION:    Portable chest xray: Shortness of Breath    Comparison: Most recent prior chest radiograph from 4/25/2019.    FINDINGS:    Lines/Tubes: None.    Heartand mediastinum:  Median sternotomy wires, mediastinal surgical   clips, and coronary stent.    Lungs, pleura, and airways: No focal pulmonary consolidation. No pleural   effusion or pneumothorax.    Bones and soft tissues: The bones and soft tissuesare unchanged.    Impression:    Clear lungs.              STACY HERNANDEZ M.D., RADIOLOGY RESIDENT  This document has been electronically signed.  EMIL BRUNO M.D., ATTENDING RADIOLOGIST  This document has been electronically signed. May  2 2019 12:05PM        < end of copied text >              RECENT CULTURES:        RESPIRATORY CULTURES:          Studies  Chest X-RAY  CT SCAN Chest   Venous Dopplers: LE:   CT Abdomen  Others

## 2019-05-06 LAB
ANION GAP SERPL CALC-SCNC: 15 MMO/L — HIGH (ref 7–14)
BUN SERPL-MCNC: 81 MG/DL — HIGH (ref 7–23)
CALCIUM SERPL-MCNC: 9.4 MG/DL — SIGNIFICANT CHANGE UP (ref 8.4–10.5)
CHLORIDE SERPL-SCNC: 104 MMOL/L — SIGNIFICANT CHANGE UP (ref 98–107)
CK MB BLD-MCNC: 4.58 NG/ML — SIGNIFICANT CHANGE UP (ref 1–4.7)
CK SERPL-CCNC: 54 U/L — SIGNIFICANT CHANGE UP (ref 25–170)
CO2 SERPL-SCNC: 18 MMOL/L — LOW (ref 22–31)
CREAT SERPL-MCNC: 2.24 MG/DL — HIGH (ref 0.5–1.3)
GLUCOSE SERPL-MCNC: 239 MG/DL — HIGH (ref 70–99)
HCT VFR BLD CALC: 27.2 % — LOW (ref 34.5–45)
HGB BLD-MCNC: 8.4 G/DL — LOW (ref 11.5–15.5)
MAGNESIUM SERPL-MCNC: 2.5 MG/DL — SIGNIFICANT CHANGE UP (ref 1.6–2.6)
MCHC RBC-ENTMCNC: 28.5 PG — SIGNIFICANT CHANGE UP (ref 27–34)
MCHC RBC-ENTMCNC: 30.9 % — LOW (ref 32–36)
MCV RBC AUTO: 92.2 FL — SIGNIFICANT CHANGE UP (ref 80–100)
NRBC # FLD: 0.02 K/UL — SIGNIFICANT CHANGE UP (ref 0–0)
PLATELET # BLD AUTO: 408 K/UL — HIGH (ref 150–400)
PMV BLD: 8.7 FL — SIGNIFICANT CHANGE UP (ref 7–13)
POTASSIUM SERPL-MCNC: 4.4 MMOL/L — SIGNIFICANT CHANGE UP (ref 3.5–5.3)
POTASSIUM SERPL-SCNC: 4.4 MMOL/L — SIGNIFICANT CHANGE UP (ref 3.5–5.3)
RBC # BLD: 2.95 M/UL — LOW (ref 3.8–5.2)
RBC # FLD: 16.5 % — HIGH (ref 10.3–14.5)
SODIUM SERPL-SCNC: 137 MMOL/L — SIGNIFICANT CHANGE UP (ref 135–145)
TROPONIN T, HIGH SENSITIVITY: 47 NG/L — SIGNIFICANT CHANGE UP (ref ?–14)
WBC # BLD: 13.21 K/UL — HIGH (ref 3.8–10.5)
WBC # FLD AUTO: 13.21 K/UL — HIGH (ref 3.8–10.5)

## 2019-05-06 PROCEDURE — 93010 ELECTROCARDIOGRAM REPORT: CPT

## 2019-05-06 RX ORDER — HUMAN INSULIN 100 [IU]/ML
20 INJECTION, SUSPENSION SUBCUTANEOUS AT BEDTIME
Qty: 0 | Refills: 0 | Status: DISCONTINUED | OUTPATIENT
Start: 2019-05-06 | End: 2019-05-08

## 2019-05-06 RX ORDER — METOPROLOL TARTRATE 50 MG
25 TABLET ORAL DAILY
Qty: 0 | Refills: 0 | Status: DISCONTINUED | OUTPATIENT
Start: 2019-05-06 | End: 2019-05-07

## 2019-05-06 RX ORDER — HUMAN INSULIN 100 [IU]/ML
20 INJECTION, SUSPENSION SUBCUTANEOUS
Qty: 0 | Refills: 0 | Status: DISCONTINUED | OUTPATIENT
Start: 2019-05-06 | End: 2019-05-08

## 2019-05-06 RX ADMIN — Medication 30 UNIT(S): at 13:18

## 2019-05-06 RX ADMIN — GABAPENTIN 100 MILLIGRAM(S): 400 CAPSULE ORAL at 06:05

## 2019-05-06 RX ADMIN — HUMAN INSULIN 20 UNIT(S): 100 INJECTION, SUSPENSION SUBCUTANEOUS at 22:53

## 2019-05-06 RX ADMIN — ISOSORBIDE DINITRATE 10 MILLIGRAM(S): 5 TABLET ORAL at 13:19

## 2019-05-06 RX ADMIN — HEPARIN SODIUM 5000 UNIT(S): 5000 INJECTION INTRAVENOUS; SUBCUTANEOUS at 22:23

## 2019-05-06 RX ADMIN — BUDESONIDE AND FORMOTEROL FUMARATE DIHYDRATE 2 PUFF(S): 160; 4.5 AEROSOL RESPIRATORY (INHALATION) at 22:00

## 2019-05-06 RX ADMIN — Medication 100 MILLIGRAM(S): at 06:05

## 2019-05-06 RX ADMIN — POLYETHYLENE GLYCOL 3350 17 GRAM(S): 17 POWDER, FOR SOLUTION ORAL at 06:07

## 2019-05-06 RX ADMIN — Medication 37.5 MILLIGRAM(S): at 22:15

## 2019-05-06 RX ADMIN — ISOSORBIDE DINITRATE 10 MILLIGRAM(S): 5 TABLET ORAL at 06:09

## 2019-05-06 RX ADMIN — Medication 37.5 MILLIGRAM(S): at 06:06

## 2019-05-06 RX ADMIN — Medication 1 SPRAY(S): at 22:18

## 2019-05-06 RX ADMIN — Medication 3 MILLILITER(S): at 22:48

## 2019-05-06 RX ADMIN — Medication 30 UNIT(S): at 18:19

## 2019-05-06 RX ADMIN — Medication 3: at 09:12

## 2019-05-06 RX ADMIN — Medication 1 SPRAY(S): at 13:19

## 2019-05-06 RX ADMIN — MONTELUKAST 10 MILLIGRAM(S): 4 TABLET, CHEWABLE ORAL at 13:19

## 2019-05-06 RX ADMIN — Medication 3 MILLILITER(S): at 15:43

## 2019-05-06 RX ADMIN — PANTOPRAZOLE SODIUM 40 MILLIGRAM(S): 20 TABLET, DELAYED RELEASE ORAL at 06:05

## 2019-05-06 RX ADMIN — HEPARIN SODIUM 5000 UNIT(S): 5000 INJECTION INTRAVENOUS; SUBCUTANEOUS at 13:18

## 2019-05-06 RX ADMIN — Medication 1 SPRAY(S): at 06:07

## 2019-05-06 RX ADMIN — Medication 3 MILLILITER(S): at 09:33

## 2019-05-06 RX ADMIN — Medication 30 UNIT(S): at 09:13

## 2019-05-06 RX ADMIN — ATORVASTATIN CALCIUM 40 MILLIGRAM(S): 80 TABLET, FILM COATED ORAL at 22:29

## 2019-05-06 RX ADMIN — TICAGRELOR 90 MILLIGRAM(S): 90 TABLET ORAL at 17:30

## 2019-05-06 RX ADMIN — Medication 50 MICROGRAM(S): at 06:05

## 2019-05-06 RX ADMIN — Medication 100 MILLIGRAM(S): at 17:30

## 2019-05-06 RX ADMIN — Medication 5: at 13:18

## 2019-05-06 RX ADMIN — BUMETANIDE 2 MILLIGRAM(S): 0.25 INJECTION INTRAMUSCULAR; INTRAVENOUS at 06:05

## 2019-05-06 RX ADMIN — INSULIN GLARGINE 65 UNIT(S): 100 INJECTION, SOLUTION SUBCUTANEOUS at 22:23

## 2019-05-06 RX ADMIN — Medication 2: at 18:19

## 2019-05-06 RX ADMIN — GABAPENTIN 100 MILLIGRAM(S): 400 CAPSULE ORAL at 17:30

## 2019-05-06 RX ADMIN — Medication 3 MILLILITER(S): at 04:55

## 2019-05-06 RX ADMIN — Medication 20 MILLIGRAM(S): at 06:06

## 2019-05-06 RX ADMIN — POLYETHYLENE GLYCOL 3350 17 GRAM(S): 17 POWDER, FOR SOLUTION ORAL at 17:30

## 2019-05-06 RX ADMIN — Medication 37.5 MILLIGRAM(S): at 13:19

## 2019-05-06 RX ADMIN — HEPARIN SODIUM 5000 UNIT(S): 5000 INJECTION INTRAVENOUS; SUBCUTANEOUS at 06:07

## 2019-05-06 RX ADMIN — TICAGRELOR 90 MILLIGRAM(S): 90 TABLET ORAL at 06:09

## 2019-05-06 RX ADMIN — ISOSORBIDE DINITRATE 10 MILLIGRAM(S): 5 TABLET ORAL at 22:09

## 2019-05-06 RX ADMIN — Medication 81 MILLIGRAM(S): at 13:20

## 2019-05-06 RX ADMIN — BUDESONIDE AND FORMOTEROL FUMARATE DIHYDRATE 2 PUFF(S): 160; 4.5 AEROSOL RESPIRATORY (INHALATION) at 09:13

## 2019-05-06 RX ADMIN — CHLORHEXIDINE GLUCONATE 1 APPLICATION(S): 213 SOLUTION TOPICAL at 13:20

## 2019-05-06 NOTE — CONSULT NOTE ADULT - ASSESSMENT
Assessment  DMT2: 73y Female with DM T2 with hyperglycemia, was on  insulin at home, on high dose IV steroids, on basal bolus insulin, blood sugars running high, no hypoglycemic episode,  eating meals,  compliant with low carb diet.  CAD: on medications, stable, monitored.  Hypothyroidism: On Synthroid 50 mcg po daily, compliant with Synthroid intake, asymptomatic.  HTN: Controlled,  on antihypertensive medications.  SOB: On Tx, oxygen.  Obesity: No strict exercise routines, not on any weight loss program, neither on low calorie diet.          Jillian Banks MD  Cell: [phone]  Office: [phone]

## 2019-05-06 NOTE — PROGRESS NOTE ADULT - ASSESSMENT
72 YO F w/o h/o CAD, s/p CABG Systolic CHF, CKD 3b, who p/w sob and pino.    - Acute on Chronic Systolic CHF Exacerbation, and Severe Mitral Valve Regurgitation:      - severe systolic dysfunction with severe MVR      ** Consult and follow up CT or HF team regarding Mitraclip eval (pt still c/o pino,decreased exercise tolerance      - c/w cardiac meds      - cards management greatly appreciated       - tele     - Acute Gout Flare:       ** F/u Rheum as pt with persistent s/s       - steroids    - NSTEMI:      - Continue asa/brilinta/statin      - adjust management prn      - tele        - MERVIN on CKD      - stable. trend renal function.      - optimize comorbidities. Avoid nephrotoxic rx     - DM II with long term complications of hyperglycemia, hypoglcyemia, and nephropathy   - c/w dm rx, as above    - monitor FS closely given hypoglycemia       - complicated by poor nutritional compliance (pt fed food from outside the hospital)       - c/w dm rx      - dm education

## 2019-05-06 NOTE — PROGRESS NOTE ADULT - ATTENDING COMMENTS
AS ABOVE;  : pt was started empirically on levofloxacin for pneumonia . doubt it: she does have mild leukocytosis but afebrile: CT scan suggestive of air trappin/30: today she was started on steroids for gout attack: Her breathing seems to be better: AAWITING anjali:  : breathing wise she seems to be doing ok : Has severe MR: with PFO with left to right shunt:  5/3: for cts evaluation!  :NO SURGERY PER FAMILY  : ON STEROIDS FOR ACUTE GOUT FLARE:  : Breathing wise stable: high risk for readmission given severe MR:

## 2019-05-06 NOTE — CONSULT NOTE ADULT - SUBJECTIVE AND OBJECTIVE BOX
HPI:  73YOF with hx of CAD s/p CABG, hypothyroidism, CKD, CHF, HTN, DM p/w worsening ZEE and sob. Patient usually can walk up a few steps before feeling sob, currently feeling sob after walking from living room to kitchen.  She is using 3 pillows to sleep. No cough, fevers. (+) CP, SS and constant, She experienced more ZEE for last two days.  She has no edema in lower extremety.  Last admission to [facility] was 11/18. (24 Apr 2019 10:21)  Patient has history of diabetes, family at bed side,  on insulin at home, no recent hypoglycemic episodes. Patient follows up with PCP for diabetes management.    PAST MEDICAL & SURGICAL HISTORY:  GERD (gastroesophageal reflux disease)  CAD (coronary artery disease): s/p CABG  Hypothyroidism  Hyperlipidemia  Diabetes mellitus, type 2  S/P CABG (coronary artery bypass graft)      FAMILY HISTORY:  Family history of hypertension (Sibling): sister  Family history of diabetes mellitus (Sibling): brothers/sisters      Social History:    Outpatient Medications:    MEDICATIONS  (STANDING):  ALBUTerol/ipratropium for Nebulization 3 milliLiter(s) Nebulizer every 6 hours  aspirin enteric coated 81 milliGRAM(s) Oral daily  atorvastatin 40 milliGRAM(s) Oral at bedtime  buDESOnide 160 MICROgram(s)/formoterol 4.5 MICROgram(s) Inhaler 2 Puff(s) Inhalation two times a day  buMETAnide 2 milliGRAM(s) Oral daily  chlorhexidine 4% Liquid 1 Application(s) Topical <User Schedule>  dextrose 5%. 1000 milliLiter(s) (50 mL/Hr) IV Continuous <Continuous>  dextrose 50% Injectable 12.5 Gram(s) IV Push once  dextrose 50% Injectable 25 Gram(s) IV Push once  dextrose 50% Injectable 25 Gram(s) IV Push once  docusate sodium 100 milliGRAM(s) Oral two times a day  ergocalciferol 87508 Unit(s) Oral <User Schedule>  gabapentin 100 milliGRAM(s) Oral two times a day  heparin  Injectable 5000 Unit(s) SubCutaneous every 8 hours  hydrALAZINE 37.5 milliGRAM(s) Oral three times a day  influenza   Vaccine 0.5 milliLiter(s) IntraMuscular once  insulin glargine Injectable (LANTUS) 65 Unit(s) SubCutaneous at bedtime  insulin lispro (HumaLOG) corrective regimen sliding scale   SubCutaneous three times a day before meals  insulin lispro (HumaLOG) corrective regimen sliding scale   SubCutaneous at bedtime  insulin lispro Injectable (HumaLOG) 30 Unit(s) SubCutaneous three times a day before meals  insulin NPH human recombinant 20 Unit(s) SubCutaneous before breakfast  insulin NPH human recombinant 20 Unit(s) SubCutaneous at bedtime  isosorbide   dinitrate Tablet (ISORDIL) 10 milliGRAM(s) Oral three times a day  levothyroxine 50 MICROGram(s) Oral daily  methylPREDNISolone sodium succinate Injectable 20 milliGRAM(s) IV Push daily  montelukast 10 milliGRAM(s) Oral daily  pantoprazole    Tablet 40 milliGRAM(s) Oral before breakfast  polyethylene glycol 3350 17 Gram(s) Oral two times a day  senna 2 Tablet(s) Oral at bedtime  sodium chloride 0.65% Nasal 1 Spray(s) Both Nostrils three times a day  ticagrelor 90 milliGRAM(s) Oral two times a day    MEDICATIONS  (PRN):  acetaminophen   Tablet .. 650 milliGRAM(s) Oral every 6 hours PRN Mild Pain (1 - 3), Moderate Pain (4 - 6), Severe Pain (7 - 10)  ALBUTerol/ipratropium for Nebulization. 3 milliLiter(s) Nebulizer once PRN Shortness of Breath  dextrose 40% Gel 15 Gram(s) Oral once PRN Blood Glucose LESS THAN 70 milliGRAM(s)/deciliter  diphenhydrAMINE 25 milliGRAM(s) Oral every 4 hours PRN Rash and/or Itching  glucagon  Injectable 1 milliGRAM(s) IntraMuscular once PRN Glucose LESS THAN 70 milligrams/deciliter  ondansetron Injectable 4 milliGRAM(s) IV Push once PRN Nausea and/or Vomiting      Allergies    azithromycin (Hives; Pruritus)    Intolerances      Review of Systems:  Constitutional: No fever, no chills  Eyes: No blurry vision  Neuro: No tremors  HEENT: No pain, no neck swelling  Cardiovascular: No chest pain, no palpitations  Respiratory: Has SOB, no cough  GI: No nausea, vomiting, abdominal pain  : No dysuria  Skin: no rash  MSK: Has leg swelling.  Psych: no depression  Endocrine: no polyuria, polydipsia    ALL OTHER SYSTEMS REVIEWED AND NEGATIVE    UNABLE TO OBTAIN    PHYSICAL EXAM:  VITALS: T(C): 36.5 (05-06-19 @ 10:15)  T(F): 97.7 (05-06-19 @ 10:15), Max: 98.4 (05-05-19 @ 18:27)  HR: 100 (05-06-19 @ 10:15) (85 - 100)  BP: 111/48 (05-06-19 @ 10:15) (102/53 - 114/55)  RR:  (16 - 19)  SpO2:  (98% - 100%)  Wt(kg): --  GENERAL: NAD, well-groomed, well-developed  EYES: No proptosis, no lid lag  HEENT:  Atraumatic, Normocephalic  THYROID: Normal size, no palpable nodules  RESPIRATORY: Clear to auscultation bilaterally; No rales, rhonchi, wheezing  CARDIOVASCULAR: Si S2, No murmurs;  GI: Soft, non distended, normal bowel sounds  SKIN: Dry, intact, No rashes or lesions  MUSCULOSKELETAL: Has BL lower extremity edema.  NEURO:  no tremor, sensation decreased in feet BL,    POCT Blood Glucose.: 350 mg/dL (05-06-19 @ 10:07)  POCT Blood Glucose.: 358 mg/dL (05-06-19 @ 10:04)  POCT Blood Glucose.: 275 mg/dL (05-06-19 @ 08:29)  POCT Blood Glucose.: 109 mg/dL (05-05-19 @ 22:24)  POCT Blood Glucose.: 86 mg/dL (05-05-19 @ 22:00)  POCT Blood Glucose.: 89 mg/dL (05-05-19 @ 21:42)  POCT Blood Glucose.: 120 mg/dL (05-05-19 @ 17:41)  POCT Blood Glucose.: 293 mg/dL (05-05-19 @ 12:57)  POCT Blood Glucose.: 196 mg/dL (05-05-19 @ 08:41)  POCT Blood Glucose.: 125 mg/dL (05-04-19 @ 22:15)  POCT Blood Glucose.: 273 mg/dL (05-04-19 @ 17:44)  POCT Blood Glucose.: 250 mg/dL (05-04-19 @ 12:37)  POCT Blood Glucose.: 140 mg/dL (05-04-19 @ 08:48)  POCT Blood Glucose.: 143 mg/dL (05-03-19 @ 21:46)  POCT Blood Glucose.: 135 mg/dL (05-03-19 @ 17:44)  POCT Blood Glucose.: 206 mg/dL (05-03-19 @ 12:36)                            8.4    13.21 )-----------( 408      ( 06 May 2019 07:20 )             27.2       05-06    137  |  104  |  81<H>  ----------------------------<  239<H>  4.4   |  18<L>  |  2.24<H>    EGFR if : 25  EGFR if non : 21    Ca    9.4      05-06  Mg     2.5     05-06        Thyroid Function Tests:  04-25 @ 05:45 TSH 1.59 FreeT4 -- T3 -- Anti TPO -- Anti Thyroglobulin Ab -- TSI --      Hemoglobin A1C, Whole Blood: 7.3 % <H> [4.0 - 5.6] (04-25-19 @ 05:45)  Hemoglobin A1C, Whole Blood: 7.2 % <H> [4.0 - 5.6] (04-24-19 @ 13:50)      04-24 Chol 127 LDL 67 HDL 22<L> Trig 258<H>    Radiology:

## 2019-05-06 NOTE — PROGRESS NOTE ADULT - SUBJECTIVE AND OBJECTIVE BOX
Patient is a 73y old  Female who presents with a chief complaint of sob (06 May 2019 11:28)      Any change in ROS: family member at bedside DOing ok : no SOB     MEDICATIONS  (STANDING):  ALBUTerol/ipratropium for Nebulization 3 milliLiter(s) Nebulizer every 6 hours  aspirin enteric coated 81 milliGRAM(s) Oral daily  atorvastatin 40 milliGRAM(s) Oral at bedtime  buDESOnide 160 MICROgram(s)/formoterol 4.5 MICROgram(s) Inhaler 2 Puff(s) Inhalation two times a day  buMETAnide 2 milliGRAM(s) Oral daily  chlorhexidine 4% Liquid 1 Application(s) Topical <User Schedule>  dextrose 5%. 1000 milliLiter(s) (50 mL/Hr) IV Continuous <Continuous>  dextrose 50% Injectable 12.5 Gram(s) IV Push once  dextrose 50% Injectable 25 Gram(s) IV Push once  dextrose 50% Injectable 25 Gram(s) IV Push once  docusate sodium 100 milliGRAM(s) Oral two times a day  ergocalciferol 92904 Unit(s) Oral <User Schedule>  gabapentin 100 milliGRAM(s) Oral two times a day  heparin  Injectable 5000 Unit(s) SubCutaneous every 8 hours  hydrALAZINE 37.5 milliGRAM(s) Oral three times a day  influenza   Vaccine 0.5 milliLiter(s) IntraMuscular once  insulin glargine Injectable (LANTUS) 65 Unit(s) SubCutaneous at bedtime  insulin lispro (HumaLOG) corrective regimen sliding scale   SubCutaneous three times a day before meals  insulin lispro (HumaLOG) corrective regimen sliding scale   SubCutaneous at bedtime  insulin lispro Injectable (HumaLOG) 30 Unit(s) SubCutaneous three times a day before meals  insulin NPH human recombinant 20 Unit(s) SubCutaneous before breakfast  insulin NPH human recombinant 20 Unit(s) SubCutaneous at bedtime  isosorbide   dinitrate Tablet (ISORDIL) 10 milliGRAM(s) Oral three times a day  levothyroxine 50 MICROGram(s) Oral daily  methylPREDNISolone sodium succinate Injectable 20 milliGRAM(s) IV Push daily  montelukast 10 milliGRAM(s) Oral daily  pantoprazole    Tablet 40 milliGRAM(s) Oral before breakfast  polyethylene glycol 3350 17 Gram(s) Oral two times a day  senna 2 Tablet(s) Oral at bedtime  sodium chloride 0.65% Nasal 1 Spray(s) Both Nostrils three times a day  ticagrelor 90 milliGRAM(s) Oral two times a day    MEDICATIONS  (PRN):  acetaminophen   Tablet .. 650 milliGRAM(s) Oral every 6 hours PRN Mild Pain (1 - 3), Moderate Pain (4 - 6), Severe Pain (7 - 10)  ALBUTerol/ipratropium for Nebulization. 3 milliLiter(s) Nebulizer once PRN Shortness of Breath  dextrose 40% Gel 15 Gram(s) Oral once PRN Blood Glucose LESS THAN 70 milliGRAM(s)/deciliter  diphenhydrAMINE 25 milliGRAM(s) Oral every 4 hours PRN Rash and/or Itching  glucagon  Injectable 1 milliGRAM(s) IntraMuscular once PRN Glucose LESS THAN 70 milligrams/deciliter  ondansetron Injectable 4 milliGRAM(s) IV Push once PRN Nausea and/or Vomiting    Vital Signs Last 24 Hrs  T(C): 36.5 (06 May 2019 10:15), Max: 36.9 (05 May 2019 18:27)  T(F): 97.7 (06 May 2019 10:15), Max: 98.4 (05 May 2019 18:27)  HR: 100 (06 May 2019 10:15) (85 - 100)  BP: 111/48 (06 May 2019 10:15) (107/48 - 114/55)  BP(mean): --  RR: 19 (06 May 2019 10:15) (16 - 19)  SpO2: 99% (06 May 2019 10:15) (98% - 100%)    I&O's Summary    05 May 2019 07:01  -  06 May 2019 07:00  --------------------------------------------------------  IN: 250 mL / OUT: 1100 mL / NET: -850 mL          Physical Exam:   GENERAL: NAD, well-groomed, well-developed  HEENT: MOHSEN/   Atraumatic, Normocephalic  ENMT: No tonsillar erythema, exudates, or enlargement; Moist mucous membranes, Good dentition, No lesions  NECK: Supple, No JVD, Normal thyroid  CHEST/LUNG: Scattered crackles+  CVS: Regular rate and rhythm; No murmurs, rubs, or gallops  GI: : Soft, Nontender, Nondistended; Bowel sounds present  NERVOUS SYSTEM:  Alert & Oriented X3  EXTREMITIES:  2+ Peripheral Pulses, No clubbing, cyanosis, or edema  LYMPH: No lymphadenopathy noted  SKIN: No rashes or lesions  ENDOCRINOLOGY: No Thyromegaly  PSYCH: Appropriate    Labs:                              8.4    13.21 )-----------( 408      ( 06 May 2019 07:20 )             27.2                         8.8    14.86 )-----------( 440      ( 05 May 2019 06:30 )             28.8                         8.7    12.15 )-----------( 393      ( 04 May 2019 06:22 )             28.8                         8.5    13.67 )-----------( 395      ( 03 May 2019 06:20 )             27.6     05-06    137  |  104  |  81<H>  ----------------------------<  239<H>  4.4   |  18<L>  |  2.24<H>  05-05    137  |  103  |  83<H>  ----------------------------<  167<H>  4.0   |  18<L>  |  2.34<H>  05-04    139  |  102  |  81<H>  ----------------------------<  141<H>  4.3   |  19<L>  |  2.31<H>  05-03    141  |  104  |  80<H>  ----------------------------<  158<H>  4.3   |  20<L>  |  2.28<H>  05-02    138  |  102  |  89<H>  ----------------------------<  238<H>  5.1   |  18<L>  |  2.38<H>    Ca    9.4      06 May 2019 07:20  Ca    9.8      05 May 2019 07:30  Mg     2.5     05-06  Mg     2.6     05-05    TPro  7.6  /  Alb  3.8  /  TBili  0.3  /  DBili  x   /  AST  25  /  ALT  20  /  AlkPhos  62  05-03    CAPILLARY BLOOD GLUCOSE      POCT Blood Glucose.: 350 mg/dL (06 May 2019 10:07)  POCT Blood Glucose.: 358 mg/dL (06 May 2019 10:04)  POCT Blood Glucose.: 275 mg/dL (06 May 2019 08:29)  POCT Blood Glucose.: 109 mg/dL (05 May 2019 22:24)  POCT Blood Glucose.: 86 mg/dL (05 May 2019 22:00)  POCT Blood Glucose.: 89 mg/dL (05 May 2019 21:42)  POCT Blood Glucose.: 120 mg/dL (05 May 2019 17:41)  POCT Blood Glucose.: 293 mg/dL (05 May 2019 12:57)            < from: Xray Chest 1 View- PORTABLE-Urgent (05.02.19 @ 10:47) >    EXAM:  XR CHEST PORTABLE URGENT 1V        PROCEDURE DATE:  May  2 2019         INTERPRETATION:    Portable chest xray: Shortness of Breath    Comparison: Most recent prior chest radiograph from 4/25/2019.    FINDINGS:    Lines/Tubes: None.    Heartand mediastinum:  Median sternotomy wires, mediastinal surgical   clips, and coronary stent.    Lungs, pleura, and airways: No focal pulmonary consolidation. No pleural   effusion or pneumothorax.    Bones and soft tissues: The bones and soft tissuesare unchanged.    Impression:    Clear lungs.              STACY HERNANDEZ M.D., RADIOLOGY RESIDENT  This document has been electronically signed.  EMIL BRUNO M.D., ATTENDING RADIOLOGIST  This document has been electronically signed. May  2 2019 12:05PM              < end of copied text >        RECENT CULTURES:        RESPIRATORY CULTURES:          Studies  Chest X-RAY  CT SCAN Chest   Venous Dopplers: LE:   CT Abdomen  Others

## 2019-05-06 NOTE — PROGRESS NOTE ADULT - SUBJECTIVE AND OBJECTIVE BOX
Infectious Diseases progress note:    Subjective: Events noted.  Pt with L sided neck pain/cp earlier.  Pt declining intervention on MV.  WBC elevated - on solumedrol.  No diarrhea reported.  Foot pain is better.  Daughter at bedside.     ROS:  CONSTITUTIONAL:  No fever, chills, rigors  CARDIOVASCULAR:  No chest pain or palpitations  RESPIRATORY:   No SOB, cough, dyspnea on exertion.  No wheezing  GASTROINTESTINAL:  No abd pain, N/V, diarrhea/constipation  EXTREMITIES:  No swelling or joint pain  GENITOURINARY:  No burning on urination, increased frequency or urgency.  No flank pain  NEUROLOGIC:  No HA, visual disturbances  SKIN: No rashes    Allergies    azithromycin (Hives; Pruritus)    Intolerances        ANTIBIOTICS/RELEVANT:  antimicrobials    immunologic:  influenza   Vaccine 0.5 milliLiter(s) IntraMuscular once    OTHER:  acetaminophen   Tablet .. 650 milliGRAM(s) Oral every 6 hours PRN  ALBUTerol/ipratropium for Nebulization 3 milliLiter(s) Nebulizer every 6 hours  ALBUTerol/ipratropium for Nebulization. 3 milliLiter(s) Nebulizer once PRN  aspirin enteric coated 81 milliGRAM(s) Oral daily  atorvastatin 40 milliGRAM(s) Oral at bedtime  buDESOnide 160 MICROgram(s)/formoterol 4.5 MICROgram(s) Inhaler 2 Puff(s) Inhalation two times a day  buMETAnide 2 milliGRAM(s) Oral daily  chlorhexidine 4% Liquid 1 Application(s) Topical <User Schedule>  dextrose 40% Gel 15 Gram(s) Oral once PRN  dextrose 5%. 1000 milliLiter(s) IV Continuous <Continuous>  dextrose 50% Injectable 12.5 Gram(s) IV Push once  dextrose 50% Injectable 25 Gram(s) IV Push once  dextrose 50% Injectable 25 Gram(s) IV Push once  diphenhydrAMINE 25 milliGRAM(s) Oral every 4 hours PRN  docusate sodium 100 milliGRAM(s) Oral two times a day  ergocalciferol 07508 Unit(s) Oral <User Schedule>  gabapentin 100 milliGRAM(s) Oral two times a day  glucagon  Injectable 1 milliGRAM(s) IntraMuscular once PRN  heparin  Injectable 5000 Unit(s) SubCutaneous every 8 hours  hydrALAZINE 37.5 milliGRAM(s) Oral three times a day  insulin glargine Injectable (LANTUS) 65 Unit(s) SubCutaneous at bedtime  insulin lispro (HumaLOG) corrective regimen sliding scale   SubCutaneous three times a day before meals  insulin lispro (HumaLOG) corrective regimen sliding scale   SubCutaneous at bedtime  insulin lispro Injectable (HumaLOG) 30 Unit(s) SubCutaneous three times a day before meals  insulin NPH human recombinant 20 Unit(s) SubCutaneous before breakfast  insulin NPH human recombinant 20 Unit(s) SubCutaneous at bedtime  isosorbide   dinitrate Tablet (ISORDIL) 10 milliGRAM(s) Oral three times a day  levothyroxine 50 MICROGram(s) Oral daily  methylPREDNISolone sodium succinate Injectable 20 milliGRAM(s) IV Push daily  metoprolol succinate ER 25 milliGRAM(s) Oral daily  montelukast 10 milliGRAM(s) Oral daily  ondansetron Injectable 4 milliGRAM(s) IV Push once PRN  pantoprazole    Tablet 40 milliGRAM(s) Oral before breakfast  polyethylene glycol 3350 17 Gram(s) Oral two times a day  senna 2 Tablet(s) Oral at bedtime  sodium chloride 0.65% Nasal 1 Spray(s) Both Nostrils three times a day  ticagrelor 90 milliGRAM(s) Oral two times a day      Objective:  Vital Signs Last 24 Hrs  T(C): 36.7 (06 May 2019 13:15), Max: 36.9 (05 May 2019 18:27)  T(F): 98 (06 May 2019 13:15), Max: 98.4 (05 May 2019 18:27)  HR: 87 (06 May 2019 16:00) (85 - 100)  BP: 106/49 (06 May 2019 13:15) (106/49 - 111/54)  BP(mean): --  RR: 19 (06 May 2019 13:15) (16 - 19)  SpO2: 98% (06 May 2019 13:15) (98% - 100%)    PHYSICAL EXAM:  Constitutional:NAD  Eyes:MOHSEN, EOMI  Ear/Nose/Throat: no thrush, mucositis.  Moist mucous membranes	  Neck:no JVD, no lymphadenopathy, supple  Respiratory: CTA maribel  Cardiovascular: S1S2 RRR, no murmurs  Gastrointestinal:soft, nontender,  nondistended (+) BS  Extremities:no e/e/c  Skin:  no rashes, open wounds or ulcerations        LABS:                        8.4    13.21 )-----------( 408      ( 06 May 2019 07:20 )             27.2     05-06    137  |  104  |  81<H>  ----------------------------<  239<H>  4.4   |  18<L>  |  2.24<H>    Ca    9.4      06 May 2019 07:20  Mg     2.5     05-06            Procalcitonin, Serum: 0.13 (04-26 @ 11:36)                    MICROBIOLOGY:      Culture - Blood (04.25.19 @ 10:34)    Culture - Blood:   NO ORGANISMS ISOLATED    Specimen Source: BLOOD VENOUS    Culture - Blood (04.25.19 @ 10:34)    Culture - Blood:   NO ORGANISMS ISOLATED    Specimen Source: BLOOD PERIPHERAL        RADIOLOGY & ADDITIONAL STUDIES:    < from: Xray Chest 1 View- PORTABLE-Urgent (05.02.19 @ 10:47) >    FINDINGS:    Lines/Tubes: None.    Heartand mediastinum:  Median sternotomy wires, mediastinal surgical   clips, and coronary stent.    Lungs, pleura, and airways: No focal pulmonary consolidation. No pleural   effusion or pneumothorax.    Bones and soft tissues: The bones and soft tissuesare unchanged.    Impression:    Clear lungs.    < end of copied text >

## 2019-05-06 NOTE — PROGRESS NOTE ADULT - SUBJECTIVE AND OBJECTIVE BOX
[facility] [place] [facility]   MD RAHEEL SHAH MD ANGELA WONG, PA    TEL:  OFFICE: [phone]  DR SANTOYO CELL: [phone]  DR. NGUYEN CELL: [phone]  UMM KENDALL CELL: [phone]        Patient is a 73y old  Female who presents with a chief complaint of sob (06 May 2019 10:33)      Patient seen and examined at bedside. + ZEE    VITALS:  T(F): 97.7 (05-06-19 @ 10:15), Max: 98.4 (05-05-19 @ 18:27)  HR: 100 (05-06-19 @ 10:15)  BP: 111/48 (05-06-19 @ 10:15)  RR: 19 (05-06-19 @ 10:15)  SpO2: 99% (05-06-19 @ 10:15)  Wt(kg): --    05-05 @ 07:01  -  05-06 @ 07:00  --------------------------------------------------------  IN: 250 mL / OUT: 1100 mL / NET: -850 mL          PHYSICAL EXAM:  Constitutional: NAD  Neck: No JVD  Respiratory: slight basilar crackles   Cardiovascular: S1, S2, RRR  Gastrointestinal: BS+, soft, NT/ND  Extremities: No peripheral edema    Hospital Medications:   MEDICATIONS  (STANDING):  ALBUTerol/ipratropium for Nebulization 3 milliLiter(s) Nebulizer every 6 hours  aspirin enteric coated 81 milliGRAM(s) Oral daily  atorvastatin 40 milliGRAM(s) Oral at bedtime  buDESOnide 160 MICROgram(s)/formoterol 4.5 MICROgram(s) Inhaler 2 Puff(s) Inhalation two times a day  buMETAnide 2 milliGRAM(s) Oral daily  chlorhexidine 4% Liquid 1 Application(s) Topical <User Schedule>  dextrose 5%. 1000 milliLiter(s) (50 mL/Hr) IV Continuous <Continuous>  dextrose 50% Injectable 12.5 Gram(s) IV Push once  dextrose 50% Injectable 25 Gram(s) IV Push once  dextrose 50% Injectable 25 Gram(s) IV Push once  docusate sodium 100 milliGRAM(s) Oral two times a day  ergocalciferol 53826 Unit(s) Oral <User Schedule>  gabapentin 100 milliGRAM(s) Oral two times a day  heparin  Injectable 5000 Unit(s) SubCutaneous every 8 hours  hydrALAZINE 37.5 milliGRAM(s) Oral three times a day  influenza   Vaccine 0.5 milliLiter(s) IntraMuscular once  insulin glargine Injectable (LANTUS) 65 Unit(s) SubCutaneous at bedtime  insulin lispro (HumaLOG) corrective regimen sliding scale   SubCutaneous three times a day before meals  insulin lispro (HumaLOG) corrective regimen sliding scale   SubCutaneous at bedtime  insulin lispro Injectable (HumaLOG) 30 Unit(s) SubCutaneous three times a day before meals  insulin NPH human recombinant 20 Unit(s) SubCutaneous before breakfast  insulin NPH human recombinant 20 Unit(s) SubCutaneous at bedtime  isosorbide   dinitrate Tablet (ISORDIL) 10 milliGRAM(s) Oral three times a day  levothyroxine 50 MICROGram(s) Oral daily  methylPREDNISolone sodium succinate Injectable 20 milliGRAM(s) IV Push daily  montelukast 10 milliGRAM(s) Oral daily  pantoprazole    Tablet 40 milliGRAM(s) Oral before breakfast  polyethylene glycol 3350 17 Gram(s) Oral two times a day  senna 2 Tablet(s) Oral at bedtime  sodium chloride 0.65% Nasal 1 Spray(s) Both Nostrils three times a day  ticagrelor 90 milliGRAM(s) Oral two times a day      LABS:  05-06    137  |  104  |  81<H>  ----------------------------<  239<H>  4.4   |  18<L>  |  2.24<H>    Ca    9.4      06 May 2019 07:20  Mg     2.5     05-06      Creatinine Trend: 2.24 <--, 2.34 <--, 2.31 <--, 2.28 <--, 2.38 <--, 2.35 <--, 2.26 <--, 2.13 <--, 2.19 <--, 2.08 <--, 2.06 <--, 2.09 <--                                8.4    13.21 )-----------( 408      ( 06 May 2019 07:20 )             27.2     Urine Studies:  Urinalysis - [04-25-19 @ 09:30]      Color LIGHT YELLOW / Appearance CLEAR / SG 1.011 / pH 6.0      Gluc 70 / Ketone NEGATIVE  / Bili NEGATIVE / Urobili NORMAL       Blood NEGATIVE / Protein NEGATIVE / Leuk Est NEGATIVE / Nitrite NEGATIVE      RBC  / WBC  / Hyaline  / Gran  / Sq Epi  / Non Sq Epi  / Bacteria     Urine Creatinine 49.70      [05-02-19 @ 11:18]  Urine Sodium 46      [05-02-19 @ 11:18]  Urine Urea Nitrogen 770.0      [05-02-19 @ 11:18]  Urine Osmolality 422      [05-02-19 @ 11:18]    .4 (Ca --)      [04-25-19 @ 05:45]   --  PTH 43.55 (Ca --)      [09-10-18 @ 07:15]   --  PTH 36.60 (Ca --)      [09-08-18 @ 03:15]   --  Vitamin D (25OH) 25.9      [05-01-19 @ 04:00]  HbA1c 7.3      [04-25-19 @ 05:45]  TSH 1.59      [04-25-19 @ 05:45]  Lipid: chol 127, , HDL 22, LDL 67      [04-24-19 @ 12:21]    HCV 0.06, Nonreactive Hepatitis C AB  S/CO Ratio                        Interpretation  < 1.00                                   Non-Reactive  1.00 - 4.99                         Weakly-Reactive  > 5.00                                Reactive  Non-Reactive: A person with a non-reactive HCV antibody  result is considered uninfected.  No further action is  needed unless recent infection is suspected.  In these  cases, consider repeat testing later to detect  seroconversion..  Weakly-Reactive: HCV antibody test is abnormal, HCV RNA  Qualitative test will follow.  Reactive: HCV antibody test is abnormal, HCV RNA  Qualitative test will follow.  Note: HCV antibody testing is performed on the [employer] system.      [04-25-19 @ 05:45]      RADIOLOGY & ADDITIONAL STUDIES:

## 2019-05-06 NOTE — PROGRESS NOTE ADULT - SUBJECTIVE AND OBJECTIVE BOX
Patient seen and examined. She tells me that she has been having chest pressure for 4 hours, has not informed staff. Currently pain is still 8/10, no change.   No SOB, diaphoresis, ZEE, palpitations  or dizziness. Appears tachypnic.     Medications:  acetaminophen   Tablet .. 650 milliGRAM(s) Oral every 6 hours PRN  ALBUTerol/ipratropium for Nebulization 3 milliLiter(s) Nebulizer every 6 hours  ALBUTerol/ipratropium for Nebulization. 3 milliLiter(s) Nebulizer once PRN  aspirin enteric coated 81 milliGRAM(s) Oral daily  atorvastatin 40 milliGRAM(s) Oral at bedtime  buDESOnide 160 MICROgram(s)/formoterol 4.5 MICROgram(s) Inhaler 2 Puff(s) Inhalation two times a day  buMETAnide 2 milliGRAM(s) Oral daily  chlorhexidine 4% Liquid 1 Application(s) Topical <User Schedule>  dextrose 40% Gel 15 Gram(s) Oral once PRN  dextrose 5%. 1000 milliLiter(s) IV Continuous <Continuous>  dextrose 50% Injectable 12.5 Gram(s) IV Push once  dextrose 50% Injectable 25 Gram(s) IV Push once  dextrose 50% Injectable 25 Gram(s) IV Push once  diphenhydrAMINE 25 milliGRAM(s) Oral every 4 hours PRN  docusate sodium 100 milliGRAM(s) Oral two times a day  ergocalciferol 00464 Unit(s) Oral <User Schedule>  gabapentin 100 milliGRAM(s) Oral two times a day  glucagon  Injectable 1 milliGRAM(s) IntraMuscular once PRN  heparin  Injectable 5000 Unit(s) SubCutaneous every 8 hours  hydrALAZINE 37.5 milliGRAM(s) Oral three times a day  influenza   Vaccine 0.5 milliLiter(s) IntraMuscular once  insulin glargine Injectable (LANTUS) 65 Unit(s) SubCutaneous at bedtime  insulin lispro (HumaLOG) corrective regimen sliding scale   SubCutaneous three times a day before meals  insulin lispro (HumaLOG) corrective regimen sliding scale   SubCutaneous at bedtime  insulin lispro Injectable (HumaLOG) 30 Unit(s) SubCutaneous three times a day before meals  insulin NPH human recombinant 20 Unit(s) SubCutaneous before breakfast  insulin NPH human recombinant 20 Unit(s) SubCutaneous at bedtime  isosorbide   dinitrate Tablet (ISORDIL) 10 milliGRAM(s) Oral three times a day  levothyroxine 50 MICROGram(s) Oral daily  methylPREDNISolone sodium succinate Injectable 20 milliGRAM(s) IV Push daily  metoprolol succinate ER 25 milliGRAM(s) Oral daily  montelukast 10 milliGRAM(s) Oral daily  ondansetron Injectable 4 milliGRAM(s) IV Push once PRN  pantoprazole    Tablet 40 milliGRAM(s) Oral before breakfast  polyethylene glycol 3350 17 Gram(s) Oral two times a day  senna 2 Tablet(s) Oral at bedtime  sodium chloride 0.65% Nasal 1 Spray(s) Both Nostrils three times a day  ticagrelor 90 milliGRAM(s) Oral two times a day      Vitals:  Vital Signs Last 24 Hrs  T(C): 36.5 (06 May 2019 10:15), Max: 36.9 (05 May 2019 18:27)  T(F): 97.7 (06 May 2019 10:15), Max: 98.4 (05 May 2019 18:27)  HR: 100 (06 May 2019 10:15) (85 - 100)  BP: 111/48 (06 May 2019 10:15) (107/48 - 114/55)  BP(mean): --  RR: 19 (06 May 2019 10:15) (16 - 19)  SpO2: 99% (06 May 2019 10:15) (98% - 100%)    Daily     Daily Weight in k.4 (06 May 2019 11:50)    I&O's Detail    05 May 2019 07:01  -  06 May 2019 07:00  --------------------------------------------------------  IN:    Oral Fluid: 250 mL  Total IN: 250 mL    OUT:    Voided: 1100 mL  Total OUT: 1100 mL    Total NET: -850 mL      Physical Exam:     General: No distress. Comfortable.  HEENT: EOM intact.  Neck: Neck supple. JVP normal. No masses  Chest: Clear to auscultation bilaterally  CV: S1 and S2 RRR. III/VI YUSUF, no rub, or gallops. Radial pulses normal. No LE edema b/l. warm b/l.   Abdomen: Soft, non-distended, non-tender  Skin: No rashes or skin breakdown  Neurology: Alert and oriented times three. Sensation intact  Psych: Affect normal    Labs:                        8.4    13.21 )-----------( 408      ( 06 May 2019 07:20 )             27.2     05-    137  |  104  |  81<H>  ----------------------------<  239<H>  4.4   |  18<L>  |  2.24<H>    Ca    9.4      06 May 2019 07:20  Mg     2.5     -      < from: Transesophageal Echocardiogram w/o TTE (19 @ 18:25) >  OBSERVATIONS:  Mitral Valve: Mitral annular calcification and calcified  mitral leaflets which are tethered. The posterior mitral  leaflet is particularly tethered.  Severe mitral  regurgitation which originates along the coaptation line.  Aortic Root: Normal aortic root, aortic arch and descending  thoracic aorta.  Aortic Valve: Calcified trileaflet aortic valve with normal  opening.  Left Atrium: Left atrial enlargement. Left atrial appendage  could not be visualized, unclear if it was ligated during  prior CABG.  Left Ventricle: Severe  left ventricular systolic  dysfunction. Left ventricular enlargement.  Right Heart: Normal right atrium. Right ventricular  enlargement with decreased right ventricular systolic  function. Normal tricuspid valve. Normal pulmonic valve.  Pericardium/PleuraNormal pericardium with no pericardial  effusion.  Hemodynamic: Agitated saline injection demonstrates  evidence of a patent foramen ovale with left to right flow.  ------------------------------------------------------------------------    < end of copied text >    < from: Cardiac Cath Lab - Adult (18 @ 08:05) >  RI:   --  Ramus intermedius: There was a diffuse 90 % stenosis at the site  of a prior stent, . The lesion was smoothly contoured. There  was PARUL grade 3 flow through the vessel (brisk flow) and a moderate-sized  vascular territory distal to the lesion. This is a likely culprit for the  patient's clinical presentation. An intervention was performed.  COMPLICATIONS: There were no complications.  DIAGNOSTIC IMPRESSIONS: Successful PCI to severe ISR of Ramus using 3.0  Promus CORNELIA  LVEDP 32mmhG  DIAGNOSTIC RECOMMENDATIONS: Continue DAPT  Optimize heart failure therapy  INTERVENTIONAL IMPRESSIONS: Successful PCI to severe ISR of Ramus using 3.0  Promus CORNELIA  LVEDP 32mmhG  INTERVENTIONAL RECOMMENDATIONS: Continue DAPT  Optimize heart failure therapy  Prepared and signed by  Gretchen Guerra M.D.  Signed 2018 09:00:09  HEMODYNAMIC TABLES  Pressures:  Baseline  Pressures:  - HR: 69  Pressures:  - Rhythm:  Pressures:  -- Aortic Pressure (S/D/M): 132/55/77  Pressures:  -- Left Ventricle (s/edp): 130/30/--  Outputs:  Baseline  Outputs:  -- CALCULATIONS[age]  Outputs:  -- CALCULATIONS: Body Surface Area: 1.48  Outputs:  -- CALCULATIONS: Height in cm: 149.00  Outputs:  -- CALCULATIONS: Sex: Female  Outputs:  -- CALCULATIONS: Weight in k.20  Outputs:  -- OUTPUTS: O2 consumption: 184.41  Outputs:  -- OUTPUTS:Vo2 Indexed: 125.00    < end of copied text >

## 2019-05-06 NOTE — PROGRESS NOTE ADULT - SUBJECTIVE AND OBJECTIVE BOX
Patient seen and examined at bedside. with pt's daughter at bedside.   states pt is not compliant with diet - daughter fed pt bowl of rice and additional outside food  c/o sob, zee, anxiety  Case discussed with medical team    HPI:  73YOF with hx of CAD s/p CABG, hypothyroidism, CKD, CHF, HTN, DM p/w worsening ZEE and sob. Patient usually can walk up a few steps before feeling sob, currently feeling sob after walking from living room to kitchen.  She is using 3 pillows to sleep. No cough, fevers. (+) CP, SS and constant, She experienced more ZEE for last two days.  She has no edema in lower extremety.  Last admission to [facility] was 11/18. (24 Apr 2019 10:21)      PAST MEDICAL & SURGICAL HISTORY:  GERD (gastroesophageal reflux disease)  CAD (coronary artery disease): s/p CABG  Hypothyroidism  Hyperlipidemia  Diabetes mellitus, type 2  S/P CABG (coronary artery bypass graft)      azithromycin (Hives; Pruritus)       MEDICATIONS  (STANDING):  ALBUTerol/ipratropium for Nebulization 3 milliLiter(s) Nebulizer every 6 hours  aspirin enteric coated 81 milliGRAM(s) Oral daily  atorvastatin 40 milliGRAM(s) Oral at bedtime  buDESOnide 160 MICROgram(s)/formoterol 4.5 MICROgram(s) Inhaler 2 Puff(s) Inhalation two times a day  buMETAnide 2 milliGRAM(s) Oral daily  chlorhexidine 4% Liquid 1 Application(s) Topical <User Schedule>  dextrose 5%. 1000 milliLiter(s) (50 mL/Hr) IV Continuous <Continuous>  dextrose 50% Injectable 12.5 Gram(s) IV Push once  dextrose 50% Injectable 25 Gram(s) IV Push once  dextrose 50% Injectable 25 Gram(s) IV Push once  docusate sodium 100 milliGRAM(s) Oral two times a day  ergocalciferol 95810 Unit(s) Oral <User Schedule>  gabapentin 100 milliGRAM(s) Oral two times a day  heparin  Injectable 5000 Unit(s) SubCutaneous every 8 hours  hydrALAZINE 37.5 milliGRAM(s) Oral three times a day  influenza   Vaccine 0.5 milliLiter(s) IntraMuscular once  insulin glargine Injectable (LANTUS) 65 Unit(s) SubCutaneous at bedtime  insulin lispro (HumaLOG) corrective regimen sliding scale   SubCutaneous three times a day before meals  insulin lispro (HumaLOG) corrective regimen sliding scale   SubCutaneous at bedtime  insulin lispro Injectable (HumaLOG) 30 Unit(s) SubCutaneous three times a day before meals  isosorbide   dinitrate Tablet (ISORDIL) 10 milliGRAM(s) Oral three times a day  levothyroxine 50 MICROGram(s) Oral daily  methylPREDNISolone sodium succinate Injectable 20 milliGRAM(s) IV Push daily  montelukast 10 milliGRAM(s) Oral daily  pantoprazole    Tablet 40 milliGRAM(s) Oral before breakfast  polyethylene glycol 3350 17 Gram(s) Oral two times a day  senna 2 Tablet(s) Oral at bedtime  sodium chloride 0.65% Nasal 1 Spray(s) Both Nostrils three times a day  ticagrelor 90 milliGRAM(s) Oral two times a day    MEDICATIONS  (PRN):  acetaminophen   Tablet .. 650 milliGRAM(s) Oral every 6 hours PRN Mild Pain (1 - 3), Moderate Pain (4 - 6), Severe Pain (7 - 10)  ALBUTerol/ipratropium for Nebulization. 3 milliLiter(s) Nebulizer once PRN Shortness of Breath  dextrose 40% Gel 15 Gram(s) Oral once PRN Blood Glucose LESS THAN 70 milliGRAM(s)/deciliter  diphenhydrAMINE 25 milliGRAM(s) Oral every 4 hours PRN Rash and/or Itching  glucagon  Injectable 1 milliGRAM(s) IntraMuscular once PRN Glucose LESS THAN 70 milligrams/deciliter  ondansetron Injectable 4 milliGRAM(s) IV Push once PRN Nausea and/or Vomiting      REVIEW OF SYSTEMS:  CONSTITUTIONAL: (+) not compliant with diet - daughter fed pt rice and outside food, also c/o malaise. anxiety  EYES: No acute change in vision   ENT:  No tinnitus  NECK: No stiffness  RESPIRATORY:zee. sob. no hemoptysis  CARDIOVASCULAR: No chest pain, palpitations, syncope  GASTROINTESTINAL: No hematemesis, diarrhea, melena, or hematochezia.  GENITOURINARY: No hematuria  NEUROLOGICAL: No headaches  LYMPH Nodes: No enlarged glands  ENDOCRINE: No heat or cold intolerance	    T(C): 36.5 (05-06-19 @ 10:15), Max: 36.9 (05-05-19 @ 18:27)  HR: 100 (05-06-19 @ 10:15) (85 - 100)  BP: 111/48 (05-06-19 @ 10:15) (102/53 - 114/55)  RR: 19 (05-06-19 @ 10:15) (16 - 19)  SpO2: 99% (05-06-19 @ 10:15) (98% - 100%)    PHYSICAL EXAMINATION:   Constitutional: mild distress, anxious  HEENT: NC, AT  Neck:  Supple  Respiratory:  tachypnea. adequate airflow b/l. Not using accessory muscles of respiration.  Cardiovascular:  systolic murmur, S1 & S2 intact, no R/G, 2+ radial pulses b/l  Gastrointestinal: Soft, NT, ND, normoactive b.s., no organomegaly/RT/rigidity  Extremities: WWP  Neurological:  Alert and awake.  No acute focal motor deficits. Crude sensation intact.     Labs and imaging reviewed    LABS:                        8.4    13.21 )-----------( 408      ( 06 May 2019 07:20 )             27.2     05-06    137  |  104  |  81<H>  ----------------------------<  239<H>  4.4   |  18<L>  |  2.24<H>    Ca    9.4      06 May 2019 07:20  Mg     2.5     05-06              CAPILLARY BLOOD GLUCOSE      POCT Blood Glucose.: 350 mg/dL (06 May 2019 10:07)  POCT Blood Glucose.: 358 mg/dL (06 May 2019 10:04)  POCT Blood Glucose.: 275 mg/dL (06 May 2019 08:29)  POCT Blood Glucose.: 109 mg/dL (05 May 2019 22:24)  POCT Blood Glucose.: 86 mg/dL (05 May 2019 22:00)  POCT Blood Glucose.: 89 mg/dL (05 May 2019 21:42)  POCT Blood Glucose.: 120 mg/dL (05 May 2019 17:41)  POCT Blood Glucose.: 293 mg/dL (05 May 2019 12:57)              RADIOLOGY & ADDITIONAL STUDIES:

## 2019-05-06 NOTE — CONSULT NOTE ADULT - PROBLEM SELECTOR RECOMMENDATION 9
Will continue current insulin regimen and add NPH 20u BID while on high dose steroids. Will continue monitoring FS, log, will Follow up.  Patient counseled for compliance with consistent low carb diet.

## 2019-05-06 NOTE — CONSULT NOTE ADULT - PROBLEM SELECTOR RECOMMENDATION 2
Continue current Synthroid dose, will get full TFTs panel.
continue ASA 81 mg po qd.
cont per primary team

## 2019-05-06 NOTE — CONSULT NOTE ADULT - PROBLEM SELECTOR RECOMMENDATION 3
On medications,  no chest pain, stable, monitored and followed up by primary team/cardiology team
on statins

## 2019-05-06 NOTE — PROGRESS NOTE ADULT - ASSESSMENT
73YOF with hx of CAD s/p CABG, hypothyroidism, CKD, CHF, HTN, DM p/w worsening ZEE and sob. Patient usually can walk up a few steps before feeling sob, currently feeling sob after walking from living room to kitchen.  She is using 3 pillows to sleep. No cough, fevers. (+) CP, SS and constant, She experienced more ZEE for last two days.  She has no edema in lower extremity.  Last admission to [facility] was 11/18. (24 Apr 2019 10:21)    ER vss.  Pt afebrile.  WBC 8.7 --> 10.7.  UA (-), RVP (-).  4/25 cxr with L hazy retrocardiac opacity.   Pt found to have NSTEMI and gross fluid overload, started on IV lasix.  She is pending [facility].        ID consult called for further abx managment and evaluation for pna.       Recommend:    - Pt with ZEE/SOB, (+) NSTEMI.  Cxr with L hazy opacity.  Pt w/o cough or fever to suggest pna on clinical basis.  CT chest shows mosaic attentuation/pneumonitis/atypical infection.       - Azithro d/c'd due to allergic reaction. Pt completed 7 day course of levaquin  for possible superimposed pna. Completed on 5/2.       - Pt transferred to CCU, and back to telemetry,  for continued managment of acute decompensated CHF, now on telemetry.   TTE with severe LV dysfunction, severe MR and PH.   LV shows ischemic MR.  Pt declining intervention    - WBC elevated - likely due to steroids in the setting of treatment for gout.  No diarrhea to suggest Cdiff.  Cont to monitor.    No acute ID issues at this time.      Joselin Roman  [phone]

## 2019-05-06 NOTE — PROGRESS NOTE ADULT - PROBLEM SELECTOR PLAN 1
JVP normal.   Continue Bumex 2 mg po qd.   Not on ACEI/ARB/ARNI due to MERVIN.   Continue hydral  37.5 mg po TID  Continue isordil 10 mg po TID.   Added Toprol 25 mg po qd today.   Strict I/O and daily standing weights.   Pt does not want intervention on severe MR.

## 2019-05-06 NOTE — PROGRESS NOTE ADULT - PROBLEM SELECTOR PLAN 2
chest pain is stable. on dual antiplatelet therapy. management per cardiology   per cardiology some point heart cath  : per cardiology : she is on diuretics!  : has severe MR for anjali in AM  : ANJALI reviewed: has severe MR:  5/3: cts evalaution pendin/4: Cont currtn rx:  : cont rx:  : stabke/1

## 2019-05-06 NOTE — PROGRESS NOTE ADULT - ASSESSMENT
73YOF with hx of CAD s/p CABG, hypothyroidism, CKD, CHF, HTN, DM p/w worsening ZEE and sob.    A/p  MERVIN  Etiology?  Likely sec to CHF  Renal function slightly better today  On bumex 2mg po daily. Continue diuretic. optimize DM control endo on board   monitor bmp  avoid nephrotoxic agents  **If pt planned for cardiac cath , pt is 26% risk of developing NGHIA and 1.09% risk of requiring RRT. In view of fluid overload status, no IVF prior to cath. Recommend to hold morning dose of on lasix on the day of procedure    CKD stage 3  baseline cr 1.5-1.8  likely sec to HTN/DM/chf  elevated PTH, vit d insufficieny, continue vit d 08627 QW x 4 dose   phos optimal    CHF  monitor daily weight and i/o  diuretics per cardio  f/u cardio

## 2019-05-06 NOTE — PROGRESS NOTE ADULT - PROBLEM SELECTOR PLAN 1
to me ct scan looks more like air trapping: SHE IS NOT WHEEZING: BUT DON'T HAVE HER pft : WOULD SUGGEST TO STARTS SYMBICORT AS WELL AS SINGULAIR Daily: doubt pneumonia:   4/30: she seems to eb doing better: cont symbicort as wellas singulair:  5/2: She has been stable: no SOB : being diuresed: Has severe MR with PFO with left to rigth flow: Cont BD  5/3: maris antibtiocs now: cont diuresis: for cts evaluation for alonso clip: Cont BD with some evidence of air trapping on ct chest1  5/4: no surgery per family : conservative management:  5/5: stable: no SOB at rest  5/6: she is doing ok : no issues overnight: cont current management:

## 2019-05-07 LAB
ANION GAP SERPL CALC-SCNC: 17 MMO/L — HIGH (ref 7–14)
BUN SERPL-MCNC: 82 MG/DL — HIGH (ref 7–23)
CALCIUM SERPL-MCNC: 9.2 MG/DL — SIGNIFICANT CHANGE UP (ref 8.4–10.5)
CHLORIDE SERPL-SCNC: 107 MMOL/L — SIGNIFICANT CHANGE UP (ref 98–107)
CO2 SERPL-SCNC: 17 MMOL/L — LOW (ref 22–31)
CREAT SERPL-MCNC: 2.41 MG/DL — HIGH (ref 0.5–1.3)
GLUCOSE SERPL-MCNC: 93 MG/DL — SIGNIFICANT CHANGE UP (ref 70–99)
HCT VFR BLD CALC: 27.3 % — LOW (ref 34.5–45)
HGB BLD-MCNC: 8.3 G/DL — LOW (ref 11.5–15.5)
MAGNESIUM SERPL-MCNC: 2.7 MG/DL — HIGH (ref 1.6–2.6)
MCHC RBC-ENTMCNC: 28.6 PG — SIGNIFICANT CHANGE UP (ref 27–34)
MCHC RBC-ENTMCNC: 30.4 % — LOW (ref 32–36)
MCV RBC AUTO: 94.1 FL — SIGNIFICANT CHANGE UP (ref 80–100)
NRBC # FLD: 0 K/UL — SIGNIFICANT CHANGE UP (ref 0–0)
PLATELET # BLD AUTO: 379 K/UL — SIGNIFICANT CHANGE UP (ref 150–400)
PMV BLD: 8.4 FL — SIGNIFICANT CHANGE UP (ref 7–13)
POTASSIUM SERPL-MCNC: 4.1 MMOL/L — SIGNIFICANT CHANGE UP (ref 3.5–5.3)
POTASSIUM SERPL-SCNC: 4.1 MMOL/L — SIGNIFICANT CHANGE UP (ref 3.5–5.3)
RBC # BLD: 2.9 M/UL — LOW (ref 3.8–5.2)
RBC # FLD: 16.6 % — HIGH (ref 10.3–14.5)
SODIUM SERPL-SCNC: 141 MMOL/L — SIGNIFICANT CHANGE UP (ref 135–145)
T4 FREE SERPL-MCNC: 1.1 NG/DL — SIGNIFICANT CHANGE UP (ref 0.9–1.8)
TSH SERPL-MCNC: 3.44 UIU/ML — SIGNIFICANT CHANGE UP (ref 0.27–4.2)
WBC # BLD: 15.34 K/UL — HIGH (ref 3.8–10.5)
WBC # FLD AUTO: 15.34 K/UL — HIGH (ref 3.8–10.5)

## 2019-05-07 PROCEDURE — 99233 SBSQ HOSP IP/OBS HIGH 50: CPT

## 2019-05-07 PROCEDURE — 93010 ELECTROCARDIOGRAM REPORT: CPT

## 2019-05-07 RX ORDER — METOPROLOL TARTRATE 50 MG
12.5 TABLET ORAL DAILY
Qty: 0 | Refills: 0 | Status: DISCONTINUED | OUTPATIENT
Start: 2019-05-08 | End: 2019-05-09

## 2019-05-07 RX ORDER — HYDRALAZINE HCL 50 MG
25 TABLET ORAL THREE TIMES A DAY
Qty: 0 | Refills: 0 | Status: DISCONTINUED | OUTPATIENT
Start: 2019-05-07 | End: 2019-05-13

## 2019-05-07 RX ORDER — BUMETANIDE 0.25 MG/ML
2 INJECTION INTRAMUSCULAR; INTRAVENOUS
Qty: 0 | Refills: 0 | Status: DISCONTINUED | OUTPATIENT
Start: 2019-05-07 | End: 2019-05-09

## 2019-05-07 RX ADMIN — Medication 3 MILLILITER(S): at 22:51

## 2019-05-07 RX ADMIN — Medication 100 MILLIGRAM(S): at 06:23

## 2019-05-07 RX ADMIN — Medication 20 MILLIGRAM(S): at 06:33

## 2019-05-07 RX ADMIN — BUMETANIDE 2 MILLIGRAM(S): 0.25 INJECTION INTRAMUSCULAR; INTRAVENOUS at 18:53

## 2019-05-07 RX ADMIN — Medication 25 MILLIGRAM(S): at 02:22

## 2019-05-07 RX ADMIN — BUMETANIDE 2 MILLIGRAM(S): 0.25 INJECTION INTRAMUSCULAR; INTRAVENOUS at 06:20

## 2019-05-07 RX ADMIN — BUDESONIDE AND FORMOTEROL FUMARATE DIHYDRATE 2 PUFF(S): 160; 4.5 AEROSOL RESPIRATORY (INHALATION) at 21:03

## 2019-05-07 RX ADMIN — Medication 30 UNIT(S): at 18:52

## 2019-05-07 RX ADMIN — MONTELUKAST 10 MILLIGRAM(S): 4 TABLET, CHEWABLE ORAL at 12:56

## 2019-05-07 RX ADMIN — HEPARIN SODIUM 5000 UNIT(S): 5000 INJECTION INTRAVENOUS; SUBCUTANEOUS at 06:31

## 2019-05-07 RX ADMIN — Medication 100 MILLIGRAM(S): at 18:51

## 2019-05-07 RX ADMIN — ATORVASTATIN CALCIUM 40 MILLIGRAM(S): 80 TABLET, FILM COATED ORAL at 21:07

## 2019-05-07 RX ADMIN — Medication 5: at 12:57

## 2019-05-07 RX ADMIN — Medication 50 MICROGRAM(S): at 06:23

## 2019-05-07 RX ADMIN — Medication 81 MILLIGRAM(S): at 12:56

## 2019-05-07 RX ADMIN — TICAGRELOR 90 MILLIGRAM(S): 90 TABLET ORAL at 06:22

## 2019-05-07 RX ADMIN — TICAGRELOR 90 MILLIGRAM(S): 90 TABLET ORAL at 18:51

## 2019-05-07 RX ADMIN — Medication 3 MILLILITER(S): at 05:41

## 2019-05-07 RX ADMIN — BUDESONIDE AND FORMOTEROL FUMARATE DIHYDRATE 2 PUFF(S): 160; 4.5 AEROSOL RESPIRATORY (INHALATION) at 09:45

## 2019-05-07 RX ADMIN — ISOSORBIDE DINITRATE 10 MILLIGRAM(S): 5 TABLET ORAL at 09:46

## 2019-05-07 RX ADMIN — HUMAN INSULIN 20 UNIT(S): 100 INJECTION, SUSPENSION SUBCUTANEOUS at 22:10

## 2019-05-07 RX ADMIN — HEPARIN SODIUM 5000 UNIT(S): 5000 INJECTION INTRAVENOUS; SUBCUTANEOUS at 21:05

## 2019-05-07 RX ADMIN — GABAPENTIN 100 MILLIGRAM(S): 400 CAPSULE ORAL at 06:26

## 2019-05-07 RX ADMIN — Medication 3 MILLILITER(S): at 10:08

## 2019-05-07 RX ADMIN — Medication 2: at 18:52

## 2019-05-07 RX ADMIN — Medication 1 SPRAY(S): at 21:06

## 2019-05-07 RX ADMIN — Medication 1 SPRAY(S): at 12:56

## 2019-05-07 RX ADMIN — Medication 25 MILLIGRAM(S): at 21:06

## 2019-05-07 RX ADMIN — SENNA PLUS 2 TABLET(S): 8.6 TABLET ORAL at 21:06

## 2019-05-07 RX ADMIN — Medication 3 MILLILITER(S): at 16:27

## 2019-05-07 RX ADMIN — GABAPENTIN 100 MILLIGRAM(S): 400 CAPSULE ORAL at 18:51

## 2019-05-07 RX ADMIN — Medication 30 UNIT(S): at 12:57

## 2019-05-07 RX ADMIN — PANTOPRAZOLE SODIUM 40 MILLIGRAM(S): 20 TABLET, DELAYED RELEASE ORAL at 06:21

## 2019-05-07 RX ADMIN — Medication 1 SPRAY(S): at 06:25

## 2019-05-07 RX ADMIN — HUMAN INSULIN 20 UNIT(S): 100 INJECTION, SUSPENSION SUBCUTANEOUS at 09:45

## 2019-05-07 RX ADMIN — ISOSORBIDE DINITRATE 10 MILLIGRAM(S): 5 TABLET ORAL at 21:07

## 2019-05-07 RX ADMIN — INSULIN GLARGINE 65 UNIT(S): 100 INJECTION, SOLUTION SUBCUTANEOUS at 22:08

## 2019-05-07 NOTE — PROGRESS NOTE ADULT - PROBLEM SELECTOR PLAN 1
JVP normal.   Continue Bumex 2 mg po qd.   Not on ACEI/ARB/ARNI due to MERVIN.   Decreased hydral  to 50 mg po TID- hypotension noted.   Continue isordil 10 mg po TID.   Continue Toprol 25 mg po qd.  Strict I/O and daily standing weights.   Pts family wants intervention on severe MR. Will discuss with Dr. Hay. JVP normal.   Continue Bumex 2 mg po qd.   Not on ACEI/ARB/ARNI due to MERVIN.   Decreased hydral  to 25 mg po TID- hypotension noted.   Continue isordil 10 mg po TID.   Continue Toprol 25 mg po qd.  Strict I/O and daily standing weights.   Pts family wants intervention on severe MR. Will discuss with Dr. Hay.

## 2019-05-07 NOTE — PROGRESS NOTE ADULT - ASSESSMENT
73YOF with hx of CAD s/p CABG, hypothyroidism, CKD, CHF, HTN, DM p/w worsening ZEE and sob. Patient usually can walk up a few steps before feeling sob, currently feeling sob after walking from living room to kitchen.  She is using 3 pillows to sleep. No cough, fevers. (+) CP, SS and constant, She experienced more ZEE for last two days.  She has no edema in lower extremity.  Last admission to [facility] was 11/18. (24 Apr 2019 10:21)    ER vss.  Pt afebrile.  WBC 8.7 --> 10.7.  UA (-), RVP (-).  4/25 cxr with L hazy retrocardiac opacity.   Pt found to have NSTEMI and gross fluid overload, started on IV lasix.  She is pending [facility].        ID consult called for further abx managment and evaluation for pna.       Recommend:    - Pt with ZEE/SOB, (+) NSTEMI.  Cxr with L hazy opacity.  Pt w/o cough or fever to suggest pna on clinical basis.  CT chest shows mosaic attentuation/pneumonitis/atypical infection.       - Azithro d/c'd due to allergic reaction. Pt completed 7 day course of levaquin  for possible superimposed pna. Completed on 5/2.       - Pt transferred to CCU, and back to telemetry,  for continued managment of acute decompensated CHF, now on telemetry.   TTE with severe LV dysfunction, severe MR and PH.   LV shows ischemic MR.  Pt's family wants to pursue further intervention    - WBC elevated - likely due to steroids in the setting of treatment for gout.  No diarrhea to suggest Cdiff.  Cont to monitor.    No acute ID issues at this time.      Joselin Roman  [phone]

## 2019-05-07 NOTE — PROGRESS NOTE ADULT - ASSESSMENT
72 YO F w/o h/o CAD, s/p CABG Systolic CHF, CKD 3b, who p/w sob and pino.    - Acute on Chronic Systolic CHF Exacerbation, and Severe Mitral Valve Regurgitation:      - severe systolic dysfunction with severe MVR - pt wants medical conservative management at this time      - hypotensive overnight. F/u heart failure team in decreasing/adjusting rx (isordil, hydralazine)      - PT. Case management for safe discharge planning arrangements      - cards and all consultants management greatly appreciated       - tele     - Acute Gout Flare:       - improving. F/u rheum for management     - NSTEMI:      - progressing well. continue asa/brilinta/statin      - adjust management prn      - tele        - MERVIN on CKD      - stable renal function.  trend renal function.      - optimize comorbidities. Avoid nephrotoxic rx     - DM II with long term complications of hyperglycemia, hypoglcyemia, and nephropathy   - c/w dm rx, as above    - monitor FS closely given hypoglycemia       - complicated by poor nutritional compliance (pt fed food from outside the hospital)       - c/w dm rx      - dm education

## 2019-05-07 NOTE — PROGRESS NOTE ADULT - SUBJECTIVE AND OBJECTIVE BOX
Patient is a 73y old  Female who presents with a chief complaint of sob (07 May 2019 12:11)      Any change in ROS: pt is doingok : SOB at rest:     MEDICATIONS  (STANDING):  ALBUTerol/ipratropium for Nebulization 3 milliLiter(s) Nebulizer every 6 hours  aspirin enteric coated 81 milliGRAM(s) Oral daily  atorvastatin 40 milliGRAM(s) Oral at bedtime  buDESOnide 160 MICROgram(s)/formoterol 4.5 MICROgram(s) Inhaler 2 Puff(s) Inhalation two times a day  buMETAnide 2 milliGRAM(s) Oral daily  chlorhexidine 4% Liquid 1 Application(s) Topical <User Schedule>  dextrose 5%. 1000 milliLiter(s) (50 mL/Hr) IV Continuous <Continuous>  dextrose 50% Injectable 12.5 Gram(s) IV Push once  dextrose 50% Injectable 25 Gram(s) IV Push once  dextrose 50% Injectable 25 Gram(s) IV Push once  docusate sodium 100 milliGRAM(s) Oral two times a day  ergocalciferol 23833 Unit(s) Oral <User Schedule>  gabapentin 100 milliGRAM(s) Oral two times a day  heparin  Injectable 5000 Unit(s) SubCutaneous every 8 hours  hydrALAZINE 37.5 milliGRAM(s) Oral three times a day  influenza   Vaccine 0.5 milliLiter(s) IntraMuscular once  insulin glargine Injectable (LANTUS) 65 Unit(s) SubCutaneous at bedtime  insulin lispro (HumaLOG) corrective regimen sliding scale   SubCutaneous three times a day before meals  insulin lispro (HumaLOG) corrective regimen sliding scale   SubCutaneous at bedtime  insulin lispro Injectable (HumaLOG) 30 Unit(s) SubCutaneous three times a day before meals  insulin NPH human recombinant 20 Unit(s) SubCutaneous before breakfast  insulin NPH human recombinant 20 Unit(s) SubCutaneous at bedtime  isosorbide   dinitrate Tablet (ISORDIL) 10 milliGRAM(s) Oral three times a day  levothyroxine 50 MICROGram(s) Oral daily  metoprolol succinate ER 25 milliGRAM(s) Oral daily  montelukast 10 milliGRAM(s) Oral daily  pantoprazole    Tablet 40 milliGRAM(s) Oral before breakfast  polyethylene glycol 3350 17 Gram(s) Oral two times a day  senna 2 Tablet(s) Oral at bedtime  sodium chloride 0.65% Nasal 1 Spray(s) Both Nostrils three times a day  ticagrelor 90 milliGRAM(s) Oral two times a day    MEDICATIONS  (PRN):  acetaminophen   Tablet .. 650 milliGRAM(s) Oral every 6 hours PRN Mild Pain (1 - 3), Moderate Pain (4 - 6), Severe Pain (7 - 10)  ALBUTerol/ipratropium for Nebulization. 3 milliLiter(s) Nebulizer once PRN Shortness of Breath  dextrose 40% Gel 15 Gram(s) Oral once PRN Blood Glucose LESS THAN 70 milliGRAM(s)/deciliter  diphenhydrAMINE 25 milliGRAM(s) Oral every 4 hours PRN Rash and/or Itching  glucagon  Injectable 1 milliGRAM(s) IntraMuscular once PRN Glucose LESS THAN 70 milligrams/deciliter  ondansetron Injectable 4 milliGRAM(s) IV Push once PRN Nausea and/or Vomiting    Vital Signs Last 24 Hrs  T(C): 36.4 (07 May 2019 12:53), Max: 36.7 (06 May 2019 13:15)  T(F): 97.6 (07 May 2019 12:53), Max: 98.1 (07 May 2019 05:42)  HR: 68 (07 May 2019 12:53) (68 - 166)  BP: 94/51 (07 May 2019 12:53) (86/43 - 106/49)  BP(mean): --  RR: 20 (07 May 2019 12:53) (18 - 24)  SpO2: 100% (07 May 2019 12:53) (98% - 100%)    I&O's Summary    06 May 2019 07:01  -  07 May 2019 07:00  --------------------------------------------------------  IN: 0 mL / OUT: 200 mL / NET: -200 mL    07 May 2019 07:01  -  07 May 2019 13:08  --------------------------------------------------------  IN: 0 mL / OUT: 350 mL / NET: -350 mL          Physical Exam:   GENERAL: NAD, well-groomed, well-developed  HEENT: MOHSEN/   Atraumatic, Normocephalic  ENMT: No tonsillar erythema, exudates, or enlargement; Moist mucous membranes, Good dentition, No lesions  NECK: Supple, No JVD, Normal thyroid  CHEST/LUNG: poor air entry   CVS: Regular rate and rhythm; No murmurs, rubs, or gallops  GI: : Soft, Nontender, Nondistended; Bowel sounds present  NERVOUS SYSTEM:  Alert & Oriented X3  EXTREMITIES:  - edema  LYMPH: No lymphadenopathy noted  SKIN: No rashes or lesions  ENDOCRINOLOGY: No Thyromegaly  PSYCH: Appropriate    Labs:    CARDIAC MARKERS ( 06 May 2019 13:33 )  x     / x     / 54 u/L / 4.58 ng/mL / x                                8.3    15.34 )-----------( 379      ( 07 May 2019 07:00 )             27.3                         8.4    13.21 )-----------( 408      ( 06 May 2019 07:20 )             27.2                         8.8    14.86 )-----------( 440      ( 05 May 2019 06:30 )             28.8                         8.7    12.15 )-----------( 393      ( 04 May 2019 06:22 )             28.8     05-07    141  |  107  |  82<H>  ----------------------------<  93  4.1   |  17<L>  |  2.41<H>  05-06    137  |  104  |  81<H>  ----------------------------<  239<H>  4.4   |  18<L>  |  2.24<H>  05-05    137  |  103  |  83<H>  ----------------------------<  167<H>  4.0   |  18<L>  |  2.34<H>  05-04    139  |  102  |  81<H>  ----------------------------<  141<H>  4.3   |  19<L>  |  2.31<H>    Ca    9.2      07 May 2019 07:00  Ca    9.4      06 May 2019 07:20  Mg     2.7     05-07  Mg     2.5     05-06      CAPILLARY BLOOD GLUCOSE      POCT Blood Glucose.: 358 mg/dL (07 May 2019 12:37)  POCT Blood Glucose.: 93 mg/dL (07 May 2019 08:53)  POCT Blood Glucose.: 84 mg/dL (07 May 2019 04:32)  POCT Blood Glucose.: 160 mg/dL (06 May 2019 21:59)  POCT Blood Glucose.: 216 mg/dL (06 May 2019 17:50)        < from: Xray Chest 1 View- PORTABLE-Urgent (05.02.19 @ 10:47) >    EXAM:  XR CHEST PORTABLE URGENT 1V        PROCEDURE DATE:  May  2 2019         INTERPRETATION:    Portable chest xray: Shortness of Breath    Comparison: Most recent prior chest radiograph from 4/25/2019.    FINDINGS:    Lines/Tubes: None.    Heartand mediastinum:  Median sternotomy wires, mediastinal surgical   clips, and coronary stent.    Lungs, pleura, and airways: No focal pulmonary consolidation. No pleural   effusion or pneumothorax.    Bones and soft tissues: The bones and soft tissuesare unchanged.    Impression:    Clear lungs.              STACY HERNANDEZ M.D., RADIOLOGY RESIDENT  This document has been electronically signed.  EMIL BRUNO M.D., ATTENDING RADIOLOGIST  This document has been electronically signed. May  2 2019 12:05PM    < end of copied text >            RECENT CULTURES:        RESPIRATORY CULTURES:          Studies  Chest X-RAY  CT SCAN Chest   Venous Dopplers: LE:   CT Abdomen  Others

## 2019-05-07 NOTE — PROGRESS NOTE ADULT - SUBJECTIVE AND OBJECTIVE BOX
[facility] [place] [facility]   MD RAHEEL SHAH MD ANGELA WONG, PA    TEL:  OFFICE: [phone]  DR SANTOYO CELL: [phone]  DR. NGUYEN CELL: [phone]  UMM KENDALL CELL: [phone]        Patient is a 73y old  Female who presents with a chief complaint of sob (07 May 2019 10:15)      Patient seen and examined at bedside. feeling sob    VITALS:  T(F): 97.4 (05-07-19 @ 09:54), Max: 98.1 (05-07-19 @ 05:42)  HR: 68 (05-07-19 @ 10:09)  BP: 86/43 (05-07-19 @ 09:54)  RR: 20 (05-07-19 @ 09:54)  SpO2: 99% (05-07-19 @ 10:09)  Wt(kg): --    05-06 @ 07:01  -  05-07 @ 07:00  --------------------------------------------------------  IN: 0 mL / OUT: 200 mL / NET: -200 mL    05-07 @ 07:01  -  05-07 @ 12:11  --------------------------------------------------------  IN: 0 mL / OUT: 350 mL / NET: -350 mL          PHYSICAL EXAM:  Constitutional: NAD  Neck: No JVD  Respiratory: CTAB, no wheezes, rales or rhonchi  Cardiovascular: S1, S2, RRR  Gastrointestinal: BS+, soft, NT/ND  Extremities: No peripheral edema    Hospital Medications:   MEDICATIONS  (STANDING):  ALBUTerol/ipratropium for Nebulization 3 milliLiter(s) Nebulizer every 6 hours  aspirin enteric coated 81 milliGRAM(s) Oral daily  atorvastatin 40 milliGRAM(s) Oral at bedtime  buDESOnide 160 MICROgram(s)/formoterol 4.5 MICROgram(s) Inhaler 2 Puff(s) Inhalation two times a day  buMETAnide 2 milliGRAM(s) Oral daily  chlorhexidine 4% Liquid 1 Application(s) Topical <User Schedule>  dextrose 5%. 1000 milliLiter(s) (50 mL/Hr) IV Continuous <Continuous>  dextrose 50% Injectable 12.5 Gram(s) IV Push once  dextrose 50% Injectable 25 Gram(s) IV Push once  dextrose 50% Injectable 25 Gram(s) IV Push once  docusate sodium 100 milliGRAM(s) Oral two times a day  ergocalciferol 50045 Unit(s) Oral <User Schedule>  gabapentin 100 milliGRAM(s) Oral two times a day  heparin  Injectable 5000 Unit(s) SubCutaneous every 8 hours  hydrALAZINE 37.5 milliGRAM(s) Oral three times a day  influenza   Vaccine 0.5 milliLiter(s) IntraMuscular once  insulin glargine Injectable (LANTUS) 65 Unit(s) SubCutaneous at bedtime  insulin lispro (HumaLOG) corrective regimen sliding scale   SubCutaneous three times a day before meals  insulin lispro (HumaLOG) corrective regimen sliding scale   SubCutaneous at bedtime  insulin lispro Injectable (HumaLOG) 30 Unit(s) SubCutaneous three times a day before meals  insulin NPH human recombinant 20 Unit(s) SubCutaneous before breakfast  insulin NPH human recombinant 20 Unit(s) SubCutaneous at bedtime  isosorbide   dinitrate Tablet (ISORDIL) 10 milliGRAM(s) Oral three times a day  levothyroxine 50 MICROGram(s) Oral daily  metoprolol succinate ER 25 milliGRAM(s) Oral daily  montelukast 10 milliGRAM(s) Oral daily  pantoprazole    Tablet 40 milliGRAM(s) Oral before breakfast  polyethylene glycol 3350 17 Gram(s) Oral two times a day  senna 2 Tablet(s) Oral at bedtime  sodium chloride 0.65% Nasal 1 Spray(s) Both Nostrils three times a day  ticagrelor 90 milliGRAM(s) Oral two times a day      LABS:  05-07    141  |  107  |  82<H>  ----------------------------<  93  4.1   |  17<L>  |  2.41<H>    Ca    9.2      07 May 2019 07:00  Mg     2.7     05-07      Creatinine Trend: 2.41 <--, 2.24 <--, 2.34 <--, 2.31 <--, 2.28 <--, 2.38 <--, 2.35 <--, 2.26 <--, 2.13 <--, 2.19 <--                                8.3    15.34 )-----------( 379      ( 07 May 2019 07:00 )             27.3     Urine Studies:  Urinalysis - [04-25-19 @ 09:30]      Color LIGHT YELLOW / Appearance CLEAR / SG 1.011 / pH 6.0      Gluc 70 / Ketone NEGATIVE  / Bili NEGATIVE / Urobili NORMAL       Blood NEGATIVE / Protein NEGATIVE / Leuk Est NEGATIVE / Nitrite NEGATIVE      RBC  / WBC  / Hyaline  / Gran  / Sq Epi  / Non Sq Epi  / Bacteria     Urine Creatinine 49.70      [05-02-19 @ 11:18]  Urine Sodium 46      [05-02-19 @ 11:18]  Urine Urea Nitrogen 770.0      [05-02-19 @ 11:18]  Urine Osmolality 422      [05-02-19 @ 11:18]    .4 (Ca --)      [04-25-19 @ 05:45]   --  PTH 43.55 (Ca --)      [09-10-18 @ 07:15]   --  PTH 36.60 (Ca --)      [09-08-18 @ 03:15]   --  Vitamin D (25OH) 25.9      [05-01-19 @ 04:00]  HbA1c 7.3      [04-25-19 @ 05:45]  TSH 3.44      [05-07-19 @ 07:00]  Lipid: chol 127, , HDL 22, LDL 67      [04-24-19 @ 12:21]    HCV 0.06, Nonreactive Hepatitis C AB  S/CO Ratio                        Interpretation  < 1.00                                   Non-Reactive  1.00 - 4.99                         Weakly-Reactive  > 5.00                                Reactive  Non-Reactive: A person with a non-reactive HCV antibody  result is considered uninfected.  No further action is  needed unless recent infection is suspected.  In these  cases, consider repeat testing later to detect  seroconversion..  Weakly-Reactive: HCV antibody test is abnormal, HCV RNA  Qualitative test will follow.  Reactive: HCV antibody test is abnormal, HCV RNA  Qualitative test will follow.  Note: HCV antibody testing is performed on the [employer] system.      [04-25-19 @ 05:45]      RADIOLOGY & ADDITIONAL STUDIES:

## 2019-05-07 NOTE — PROGRESS NOTE ADULT - ASSESSMENT
73YOF w/o h/o CAD, s/p CABG CHF adm to [facility] for Acute on Chronic systolic and diastolic HF

## 2019-05-07 NOTE — PROGRESS NOTE ADULT - SUBJECTIVE AND OBJECTIVE BOX
Infectious Diseases progress note:    Subjective: NAD, afebrile.  No new complaints.  WBC 15.    ROS:  CONSTITUTIONAL:  No fever, chills, rigors  CARDIOVASCULAR:  No chest pain or palpitations  RESPIRATORY:   No SOB, cough, dyspnea on exertion.  No wheezing  GASTROINTESTINAL:  No abd pain, N/V, diarrhea/constipation  EXTREMITIES:  No swelling or joint pain  GENITOURINARY:  No burning on urination, increased frequency or urgency.  No flank pain  NEUROLOGIC:  No HA, visual disturbances  SKIN: No rashes    Allergies    azithromycin (Hives; Pruritus)    Intolerances        ANTIBIOTICS/RELEVANT:  antimicrobials    immunologic:  influenza   Vaccine 0.5 milliLiter(s) IntraMuscular once    OTHER:  acetaminophen   Tablet .. 650 milliGRAM(s) Oral every 6 hours PRN  ALBUTerol/ipratropium for Nebulization 3 milliLiter(s) Nebulizer every 6 hours  ALBUTerol/ipratropium for Nebulization. 3 milliLiter(s) Nebulizer once PRN  aspirin enteric coated 81 milliGRAM(s) Oral daily  atorvastatin 40 milliGRAM(s) Oral at bedtime  buDESOnide 160 MICROgram(s)/formoterol 4.5 MICROgram(s) Inhaler 2 Puff(s) Inhalation two times a day  buMETAnide 2 milliGRAM(s) Oral daily  chlorhexidine 4% Liquid 1 Application(s) Topical <User Schedule>  dextrose 40% Gel 15 Gram(s) Oral once PRN  dextrose 5%. 1000 milliLiter(s) IV Continuous <Continuous>  dextrose 50% Injectable 12.5 Gram(s) IV Push once  dextrose 50% Injectable 25 Gram(s) IV Push once  dextrose 50% Injectable 25 Gram(s) IV Push once  diphenhydrAMINE 25 milliGRAM(s) Oral every 4 hours PRN  docusate sodium 100 milliGRAM(s) Oral two times a day  ergocalciferol 88084 Unit(s) Oral <User Schedule>  gabapentin 100 milliGRAM(s) Oral two times a day  glucagon  Injectable 1 milliGRAM(s) IntraMuscular once PRN  heparin  Injectable 5000 Unit(s) SubCutaneous every 8 hours  hydrALAZINE 37.5 milliGRAM(s) Oral three times a day  insulin glargine Injectable (LANTUS) 65 Unit(s) SubCutaneous at bedtime  insulin lispro (HumaLOG) corrective regimen sliding scale   SubCutaneous three times a day before meals  insulin lispro (HumaLOG) corrective regimen sliding scale   SubCutaneous at bedtime  insulin lispro Injectable (HumaLOG) 30 Unit(s) SubCutaneous three times a day before meals  insulin NPH human recombinant 20 Unit(s) SubCutaneous before breakfast  insulin NPH human recombinant 20 Unit(s) SubCutaneous at bedtime  isosorbide   dinitrate Tablet (ISORDIL) 10 milliGRAM(s) Oral three times a day  levothyroxine 50 MICROGram(s) Oral daily  metoprolol succinate ER 25 milliGRAM(s) Oral daily  montelukast 10 milliGRAM(s) Oral daily  ondansetron Injectable 4 milliGRAM(s) IV Push once PRN  pantoprazole    Tablet 40 milliGRAM(s) Oral before breakfast  polyethylene glycol 3350 17 Gram(s) Oral two times a day  senna 2 Tablet(s) Oral at bedtime  sodium chloride 0.65% Nasal 1 Spray(s) Both Nostrils three times a day  ticagrelor 90 milliGRAM(s) Oral two times a day      Objective:  Vital Signs Last 24 Hrs  T(C): 36.4 (07 May 2019 12:53), Max: 36.7 (07 May 2019 05:42)  T(F): 97.6 (07 May 2019 12:53), Max: 98.1 (07 May 2019 05:42)  HR: 68 (07 May 2019 12:53) (68 - 166)  BP: 94/51 (07 May 2019 12:53) (86/43 - 101/49)  BP(mean): --  RR: 20 (07 May 2019 12:53) (18 - 24)  SpO2: 100% (07 May 2019 12:53) (98% - 100%)    PHYSICAL EXAM:  Constitutional:NAD  Eyes:MOHSEN, EOMI  Ear/Nose/Throat: no thrush, mucositis.  Moist mucous membranes	  Neck:no JVD, no lymphadenopathy, supple  Respiratory: decr BS at b/l bases  Cardiovascular: S1S2 RRR, no murmurs  Gastrointestinal:soft, nontender,  nondistended (+) BS  Extremities:no e/e/c  Skin:  no rashes, open wounds or ulcerations        LABS:                        8.3    15.34 )-----------( 379      ( 07 May 2019 07:00 )             27.3     05-07    141  |  107  |  82<H>  ----------------------------<  93  4.1   |  17<L>  |  2.41<H>    Ca    9.2      07 May 2019 07:00  Mg     2.7     05-07            Procalcitonin, Serum: 0.13 (04-26 @ 11:36)                    MICROBIOLOGY:    Culture - Blood (04.25.19 @ 10:34)    Culture - Blood:   NO ORGANISMS ISOLATED    Specimen Source: BLOOD VENOUS    Culture - Blood (04.25.19 @ 10:34)    Culture - Blood:   NO ORGANISMS ISOLATED    Specimen Source: BLOOD PERIPHERAL          RADIOLOGY & ADDITIONAL STUDIES:    < from: Xray Chest 1 View- PORTABLE-Urgent (05.02.19 @ 10:47) >  FINDINGS:    Lines/Tubes: None.    Heartand mediastinum:  Median sternotomy wires, mediastinal surgical   clips, and coronary stent.    Lungs, pleura, and airways: No focal pulmonary consolidation. No pleural   effusion or pneumothorax.    Bones and soft tissues: The bones and soft tissuesare unchanged.    Impression:    Clear lungs.    < end of copied text >

## 2019-05-07 NOTE — PROGRESS NOTE ADULT - ATTENDING COMMENTS
Decrease Toprol to 12.5 qd.  Reviewed TTE and LV. Severe posterior MR. Will refer for mitral clip eval if pt and family desire, though I think the risks (renal failure, inadeqaute sx improvement) probably outweigh the benefits. Decrease Toprol to 12.5 qd.  Reviewed TTE and LV. Severe posterior MR. Will refer for mitral clip eval if pt and family desire, though I think the risks (renal failure, inadeqaute sx improvement) probably outweigh the benefits.    Addendum:  As per Dr. Hay, d/c PO Bumex, started IV Bumex 2 mg BID.

## 2019-05-07 NOTE — PROGRESS NOTE ADULT - SUBJECTIVE AND OBJECTIVE BOX
Medications:  acetaminophen   Tablet .. 650 milliGRAM(s) Oral every 6 hours PRN  ALBUTerol/ipratropium for Nebulization 3 milliLiter(s) Nebulizer every 6 hours  ALBUTerol/ipratropium for Nebulization. 3 milliLiter(s) Nebulizer once PRN  aspirin enteric coated 81 milliGRAM(s) Oral daily  atorvastatin 40 milliGRAM(s) Oral at bedtime  buDESOnide 160 MICROgram(s)/formoterol 4.5 MICROgram(s) Inhaler 2 Puff(s) Inhalation two times a day  buMETAnide 2 milliGRAM(s) Oral daily  chlorhexidine 4% Liquid 1 Application(s) Topical <User Schedule>  dextrose 40% Gel 15 Gram(s) Oral once PRN  dextrose 5%. 1000 milliLiter(s) IV Continuous <Continuous>  dextrose 50% Injectable 12.5 Gram(s) IV Push once  dextrose 50% Injectable 25 Gram(s) IV Push once  dextrose 50% Injectable 25 Gram(s) IV Push once  diphenhydrAMINE 25 milliGRAM(s) Oral every 4 hours PRN  docusate sodium 100 milliGRAM(s) Oral two times a day  ergocalciferol 14467 Unit(s) Oral <User Schedule>  gabapentin 100 milliGRAM(s) Oral two times a day  glucagon  Injectable 1 milliGRAM(s) IntraMuscular once PRN  heparin  Injectable 5000 Unit(s) SubCutaneous every 8 hours  hydrALAZINE 37.5 milliGRAM(s) Oral three times a day  influenza   Vaccine 0.5 milliLiter(s) IntraMuscular once  insulin glargine Injectable (LANTUS) 65 Unit(s) SubCutaneous at bedtime  insulin lispro (HumaLOG) corrective regimen sliding scale   SubCutaneous three times a day before meals  insulin lispro (HumaLOG) corrective regimen sliding scale   SubCutaneous at bedtime  insulin lispro Injectable (HumaLOG) 30 Unit(s) SubCutaneous three times a day before meals  insulin NPH human recombinant 20 Unit(s) SubCutaneous before breakfast  insulin NPH human recombinant 20 Unit(s) SubCutaneous at bedtime  isosorbide   dinitrate Tablet (ISORDIL) 10 milliGRAM(s) Oral three times a day  levothyroxine 50 MICROGram(s) Oral daily  metoprolol succinate ER 25 milliGRAM(s) Oral daily  montelukast 10 milliGRAM(s) Oral daily  ondansetron Injectable 4 milliGRAM(s) IV Push once PRN  pantoprazole    Tablet 40 milliGRAM(s) Oral before breakfast  polyethylene glycol 3350 17 Gram(s) Oral two times a day  senna 2 Tablet(s) Oral at bedtime  sodium chloride 0.65% Nasal 1 Spray(s) Both Nostrils three times a day  ticagrelor 90 milliGRAM(s) Oral two times a day      Vitals:  Vital Signs Last 24 Hrs  T(C): 36.4 (07 May 2019 12:53), Max: 36.7 (07 May 2019 05:42)  T(F): 97.6 (07 May 2019 12:53), Max: 98.1 (07 May 2019 05:42)  HR: 68 (07 May 2019 12:53) (68 - 166)  BP: 94/51 (07 May 2019 12:53) (86/43 - 101/49)  BP(mean): --  RR: 20 (07 May 2019 12:53) (18 - 24)  SpO2: 100% (07 May 2019 12:53) (98% - 100%)    Daily     Daily Weight in k.3 (07 May 2019 05:28)    I&O's Detail    06 May 2019 07:  -  07 May 2019 07:00  --------------------------------------------------------  IN:  Total IN: 0 mL    OUT:    Voided: 200 mL  Total OUT: 200 mL    Total NET: -200 mL      07 May 2019 07:01  -  07 May 2019 14:31  --------------------------------------------------------  IN:  Total IN: 0 mL    OUT:    Voided: 350 mL  Total OUT: 350 mL    Total NET: -350 mL          Physical Exam:     General: No distress. Comfortable.  HEENT: EOM intact.  Neck: Neck supple. JVP not elevated. No masses  Chest: Clear to auscultation bilaterally  CV: Normal S1 and S2. No murmurs, rub, or gallops. Radial pulses normal.  Abdomen: Soft, non-distended, non-tender  Skin: No rashes or skin breakdown  Neurology: Alert and oriented times three. Sensation intact  Psych: Affect normal    Labs:                        8.3    15.34 )-----------( 379      ( 07 May 2019 07:00 )             27.3     05-    141  |  107  |  82<H>  ----------------------------<  93  4.1   |  17<L>  |  2.41<H>    Ca    9.2      07 May 2019 07:00  Mg     2.7             CARDIAC MARKERS ( 06 May 2019 13:33 )  x     / x     / 54 u/L / 4.58 ng/mL / x          < from: Transesophageal Echocardiogram w/o TTE (19 @ 18:25) >  OBSERVATIONS:  Mitral Valve: Mitral annular calcification and calcified  mitral leaflets which are tethered. The posterior mitral  leaflet is particularly tethered.  Severe mitral  regurgitation which originates along the coaptation line.  Aortic Root: Normal aortic root, aortic arch and descending  thoracic aorta.  Aortic Valve: Calcified trileaflet aortic valve with normal  opening.  Left Atrium: Left atrial enlargement. Left atrial appendage  could not be visualized, unclear if it was ligated during  prior CABG.  Left Ventricle: Severe  left ventricular systolic  dysfunction. Left ventricular enlargement.  Right Heart: Normal right atrium. Right ventricular  enlargement with decreased right ventricular systolic  function. Normal tricuspid valve. Normal pulmonic valve.  Pericardium/PleuraNormal pericardium with no pericardial  effusion.  Hemodynamic: Agitated saline injection demonstrates  evidence of a patent foramen ovale with left to right flow.  ------------------------------------------------------------------------  CONCLUSIONS:  1. Mitral annular calcification and calcified mitral  leaflets which are tethered. The posterior mitral leaflet  is particularly tethered.  Severe mitral regurgitation  which originates along the coaptation line.  2. Calcified trileaflet aortic valve with normal opening.  3. Left atrial enlargement. Left atrial appendage could not  be visualized, unclear if it was ligated during prior CABG.    4. Left ventricular enlargement.  5. Severe  left ventricular systolic dysfunction.  6. Right ventricular enlargement with decreased right  ventricular systolic function.  7. Agitated saline injection demonstrates evidence of a  patent foramen ovale with left to right flow.  ADDENDUM 5/3/2019: Systolic flow reversal seen in the right  upper pulmonary vein. Technically very difficult study.  ------------------------------------------------------------------------  Revised on  5/3/2019 - 10:12:35 by ELIDIA Viveros  Confirmed on  5/3/2019 - 10:13:26 by ELIDIA Viveros  ------------------------------------------------------------------------    < end of copied text > Patient seen and examined. She wants to go home. No acute SOB, orthopnea, PND, CP, palpitations.   Has not walked much, b/c of foot pain.     Medications:  acetaminophen   Tablet .. 650 milliGRAM(s) Oral every 6 hours PRN  ALBUTerol/ipratropium for Nebulization 3 milliLiter(s) Nebulizer every 6 hours  ALBUTerol/ipratropium for Nebulization. 3 milliLiter(s) Nebulizer once PRN  aspirin enteric coated 81 milliGRAM(s) Oral daily  atorvastatin 40 milliGRAM(s) Oral at bedtime  buDESOnide 160 MICROgram(s)/formoterol 4.5 MICROgram(s) Inhaler 2 Puff(s) Inhalation two times a day  buMETAnide 2 milliGRAM(s) Oral daily  chlorhexidine 4% Liquid 1 Application(s) Topical <User Schedule>  dextrose 40% Gel 15 Gram(s) Oral once PRN  dextrose 5%. 1000 milliLiter(s) IV Continuous <Continuous>  dextrose 50% Injectable 12.5 Gram(s) IV Push once  dextrose 50% Injectable 25 Gram(s) IV Push once  dextrose 50% Injectable 25 Gram(s) IV Push once  diphenhydrAMINE 25 milliGRAM(s) Oral every 4 hours PRN  docusate sodium 100 milliGRAM(s) Oral two times a day  ergocalciferol 28460 Unit(s) Oral <User Schedule>  gabapentin 100 milliGRAM(s) Oral two times a day  glucagon  Injectable 1 milliGRAM(s) IntraMuscular once PRN  heparin  Injectable 5000 Unit(s) SubCutaneous every 8 hours  hydrALAZINE 37.5 milliGRAM(s) Oral three times a day  influenza   Vaccine 0.5 milliLiter(s) IntraMuscular once  insulin glargine Injectable (LANTUS) 65 Unit(s) SubCutaneous at bedtime  insulin lispro (HumaLOG) corrective regimen sliding scale   SubCutaneous three times a day before meals  insulin lispro (HumaLOG) corrective regimen sliding scale   SubCutaneous at bedtime  insulin lispro Injectable (HumaLOG) 30 Unit(s) SubCutaneous three times a day before meals  insulin NPH human recombinant 20 Unit(s) SubCutaneous before breakfast  insulin NPH human recombinant 20 Unit(s) SubCutaneous at bedtime  isosorbide   dinitrate Tablet (ISORDIL) 10 milliGRAM(s) Oral three times a day  levothyroxine 50 MICROGram(s) Oral daily  metoprolol succinate ER 25 milliGRAM(s) Oral daily  montelukast 10 milliGRAM(s) Oral daily  ondansetron Injectable 4 milliGRAM(s) IV Push once PRN  pantoprazole    Tablet 40 milliGRAM(s) Oral before breakfast  polyethylene glycol 3350 17 Gram(s) Oral two times a day  senna 2 Tablet(s) Oral at bedtime  sodium chloride 0.65% Nasal 1 Spray(s) Both Nostrils three times a day  ticagrelor 90 milliGRAM(s) Oral two times a day      Vitals:  Vital Signs Last 24 Hrs  T(C): 36.4 (07 May 2019 12:53), Max: 36.7 (07 May 2019 05:42)  T(F): 97.6 (07 May 2019 12:53), Max: 98.1 (07 May 2019 05:42)  HR: 68 (07 May 2019 12:53) (68 - 166)  BP: 94/51 (07 May 2019 12:53) (86/43 - 101/49)  BP(mean): --  RR: 20 (07 May 2019 12:53) (18 - 24)  SpO2: 100% (07 May 2019 12:53) (98% - 100%)    Daily     Daily Weight in k.3 (07 May 2019 05:28)    I&O's Detail    06 May 2019 07:  -  07 May 2019 07:00  --------------------------------------------------------  IN:  Total IN: 0 mL    OUT:    Voided: 200 mL  Total OUT: 200 mL    Total NET: -200 mL      07 May 2019 07:01  -  07 May 2019 14:31  --------------------------------------------------------  IN:  Total IN: 0 mL    OUT:    Voided: 350 mL  Total OUT: 350 mL    Total NET: -350 mL          Physical Exam:     General: No distress. Comfortable.  HEENT: EOM intact.  Neck: Neck supple. JVP not elevated. No masses  Chest: Clear to auscultation bilaterally  CV: Normal S1 and S2. III/VI YUSUF. no rub, or gallops. Radial pulses normal. No LE edema b/l.   Abdomen: Soft, non-distended, non-tender  Skin: No rashes or skin breakdown  Neurology: Alert and oriented times three. Sensation intact  Psych: Affect normal    Labs:                        8.3    15.34 )-----------( 379      ( 07 May 2019 07:00 )             27.3     05-07    141  |  107  |  82<H>  ----------------------------<  93  4.1   |  17<L>  |  2.41<H>    Ca    9.2      07 May 2019 07:00  Mg     2.7             CARDIAC MARKERS ( 06 May 2019 13:33 )  x     / x     / 54 u/L / 4.58 ng/mL / x          < from: Transesophageal Echocardiogram w/o TTE (19 @ 18:25) >  OBSERVATIONS:  Mitral Valve: Mitral annular calcification and calcified  mitral leaflets which are tethered. The posterior mitral  leaflet is particularly tethered.  Severe mitral  regurgitation which originates along the coaptation line.  Aortic Root: Normal aortic root, aortic arch and descending  thoracic aorta.  Aortic Valve: Calcified trileaflet aortic valve with normal  opening.  Left Atrium: Left atrial enlargement. Left atrial appendage  could not be visualized, unclear if it was ligated during  prior CABG.  Left Ventricle: Severe  left ventricular systolic  dysfunction. Left ventricular enlargement.  Right Heart: Normal right atrium. Right ventricular  enlargement with decreased right ventricular systolic  function. Normal tricuspid valve. Normal pulmonic valve.  Pericardium/PleuraNormal pericardium with no pericardial  effusion.  Hemodynamic: Agitated saline injection demonstrates  evidence of a patent foramen ovale with left to right flow.  ------------------------------------------------------------------------  CONCLUSIONS:  1. Mitral annular calcification and calcified mitral  leaflets which are tethered. The posterior mitral leaflet  is particularly tethered.  Severe mitral regurgitation  which originates along the coaptation line.  2. Calcified trileaflet aortic valve with normal opening.  3. Left atrial enlargement. Left atrial appendage could not  be visualized, unclear if it was ligated during prior CABG.    4. Left ventricular enlargement.  5. Severe  left ventricular systolic dysfunction.  6. Right ventricular enlargement with decreased right  ventricular systolic function.  7. Agitated saline injection demonstrates evidence of a  patent foramen ovale with left to right flow.  ADDENDUM 5/3/2019: Systolic flow reversal seen in the right  upper pulmonary vein. Technically very difficult study.  ------------------------------------------------------------------------  Revised on  5/3/2019 - 10:12:35 by ELIDIA Viveros  Confirmed on  5/3/2019 - 10:13:26 by ELIDIA Viveros  ------------------------------------------------------------------------    < end of copied text >

## 2019-05-07 NOTE — PROGRESS NOTE ADULT - ATTENDING COMMENTS
AS ABOVE;  : pt was started empirically on levofloxacin for pneumonia . doubt it: she does have mild leukocytosis but afebrile: CT scan suggestive of air trappin/30: today she was started on steroids for gout attack: Her breathing seems to be better: AAWITING anjali:  : breathing wise she seems to be doing ok : Has severe MR: with PFO with left to right shunt:  5/3: for cts evaluation!  :NO SURGERY PER FAMILY  : ON STEROIDS FOR ACUTE GOUT FLARE:  : Breathing wise stable: high risk for readmission given severe MR:  : doing ok from pulmonary side

## 2019-05-07 NOTE — PROGRESS NOTE ADULT - ASSESSMENT
73YOF with hx of CAD s/p CABG, hypothyroidism, CKD, CHF, HTN, DM p/w worsening ZEE and sob.    A/p  MERVIN  Etiology?  Likely sec to CHF  Renal function slightly worsen  On bumex 2mg po daily. BP low f/u cardio   monitor bmp  avoid nephrotoxic agents  **If pt planned for cardiac cath , pt is 26% risk of developing NGHIA and 1.09% risk of requiring RRT. In view of fluid overload status, no IVF prior to cath. Recommend to hold morning dose of on lasix on the day of procedure    CKD stage 3  baseline cr 1.5-1.8  likely sec to HTN/DM/chf  elevated PTH, vit d insufficieny, continue vit d 37649 QW x 4 dose   phos optimal    CHF  monitor daily weight and i/o  diuretics per cardio  f/u cardio 73YOF with hx of CAD s/p CABG, hypothyroidism, CKD, CHF, HTN, DM p/w worsening ZEE and sob.    A/p  MERVIN  Etiology?  Likely sec to CHF  Renal function slightly worsen  On bumex 2mg po daily. BP low f/u cardio   monitor bmp  avoid nephrotoxic agents  **If pt planned for cardiac cath , pt is 26% risk of developing NGHIA and 1.09% risk of requiring RRT. In view of fluid overload status, no IVF prior to cath. Recommend to hold morning dose of on diuretic  on the day of procedure    CKD stage 3  baseline cr 1.5-1.8  likely sec to HTN/DM/chf  elevated PTH, vit d insufficieny, continue vit d 98440 QW x 4 dose   phos optimal    CHF  monitor daily weight and i/o  diuretics per cardio  f/u cardio

## 2019-05-07 NOTE — PROGRESS NOTE ADULT - PROBLEM SELECTOR PLAN 1
to me ct scan looks more like air trapping: SHE IS NOT WHEEZING: BUT DON'T HAVE HER pft : WOULD SUGGEST TO STARTS SYMBICORT AS WELL AS SINGULAIR Daily: doubt pneumonia:   4/30: she seems to eb doing better: cont symbicort as wellas singulair:  5/2: She has been stable: no SOB : being diuresed: Has severe MR with PFO with left to rigth flow: Cont BD  5/3: maris antibtiocs now: cont diuresis: for cts evaluation for alonso clip: Cont BD with some evidence of air trapping on ct chest1  5/4: no surgery per family : conservative management:  5/5: stable: no SOB at rest  5/6: she is doing ok : no issues overnight: cont current management:  5/7: cont current therapy:

## 2019-05-07 NOTE — PROGRESS NOTE ADULT - SUBJECTIVE AND OBJECTIVE BOX
Chief complaint  Patient is a 73y old  Female who presents with a chief complaint of sob (07 May 2019 14:41)   Review of systems  Patient in bed, looks comfortable, no fever, no hypoglycemia.    Labs and Fingersticks  CAPILLARY BLOOD GLUCOSE      POCT Blood Glucose.: 358 mg/dL (07 May 2019 12:37)  POCT Blood Glucose.: 93 mg/dL (07 May 2019 08:53)  POCT Blood Glucose.: 84 mg/dL (07 May 2019 04:32)  POCT Blood Glucose.: 160 mg/dL (06 May 2019 21:59)  POCT Blood Glucose.: 216 mg/dL (06 May 2019 17:50)      Anion Gap, Serum: 17 <H> (05-07 @ 07:00)  Anion Gap, Serum: 15 <H> (05-06 @ 07:20)      Calcium, Total Serum: 9.2 (05-07 @ 07:00)  Calcium, Total Serum: 9.4 (05-06 @ 07:20)          05-07    141  |  107  |  82<H>  ----------------------------<  93  4.1   |  17<L>  |  2.41<H>    Ca    9.2      07 May 2019 07:00  Mg     2.7     05-07                          8.3    15.34 )-----------( 379      ( 07 May 2019 07:00 )             27.3     Medications  MEDICATIONS  (STANDING):  ALBUTerol/ipratropium for Nebulization 3 milliLiter(s) Nebulizer every 6 hours  aspirin enteric coated 81 milliGRAM(s) Oral daily  atorvastatin 40 milliGRAM(s) Oral at bedtime  buDESOnide 160 MICROgram(s)/formoterol 4.5 MICROgram(s) Inhaler 2 Puff(s) Inhalation two times a day  buMETAnide 2 milliGRAM(s) Oral daily  chlorhexidine 4% Liquid 1 Application(s) Topical <User Schedule>  dextrose 5%. 1000 milliLiter(s) (50 mL/Hr) IV Continuous <Continuous>  dextrose 50% Injectable 12.5 Gram(s) IV Push once  dextrose 50% Injectable 25 Gram(s) IV Push once  dextrose 50% Injectable 25 Gram(s) IV Push once  docusate sodium 100 milliGRAM(s) Oral two times a day  ergocalciferol 99506 Unit(s) Oral <User Schedule>  gabapentin 100 milliGRAM(s) Oral two times a day  heparin  Injectable 5000 Unit(s) SubCutaneous every 8 hours  hydrALAZINE 25 milliGRAM(s) Oral three times a day  influenza   Vaccine 0.5 milliLiter(s) IntraMuscular once  insulin glargine Injectable (LANTUS) 65 Unit(s) SubCutaneous at bedtime  insulin lispro (HumaLOG) corrective regimen sliding scale   SubCutaneous three times a day before meals  insulin lispro (HumaLOG) corrective regimen sliding scale   SubCutaneous at bedtime  insulin lispro Injectable (HumaLOG) 30 Unit(s) SubCutaneous three times a day before meals  insulin NPH human recombinant 20 Unit(s) SubCutaneous before breakfast  insulin NPH human recombinant 20 Unit(s) SubCutaneous at bedtime  isosorbide   dinitrate Tablet (ISORDIL) 10 milliGRAM(s) Oral three times a day  levothyroxine 50 MICROGram(s) Oral daily  metoprolol succinate ER 25 milliGRAM(s) Oral daily  montelukast 10 milliGRAM(s) Oral daily  pantoprazole    Tablet 40 milliGRAM(s) Oral before breakfast  polyethylene glycol 3350 17 Gram(s) Oral two times a day  senna 2 Tablet(s) Oral at bedtime  sodium chloride 0.65% Nasal 1 Spray(s) Both Nostrils three times a day  ticagrelor 90 milliGRAM(s) Oral two times a day      Physical Exam  General: Patient comfortable in bed  Vital Signs Last 12 Hrs  T(F): 97.6 (05-07-19 @ 12:53), Max: 98.1 (05-07-19 @ 05:42)  HR: 68 (05-07-19 @ 12:53) (68 - 82)  BP: 94/51 (05-07-19 @ 12:53) (86/43 - 96/54)  BP(mean): --  RR: 20 (05-07-19 @ 12:53) (18 - 20)  SpO2: 100% (05-07-19 @ 12:53) (98% - 100%)  Neck: No palpable thyroid nodules.  CVS: S1S2, No murmurs  Respiratory: No wheezing, no crepitations  GI: Abdomen soft, bowel sounds positive  Musculoskeletal:  edema lower extremities.   Skin: No skin rashes, no ecchymosis    Diagnostics    Thyroid Stimulating Hormone, Serum: AM Sched. Collection: 07-May-2019 06:00 (05-06 @ 10:38)  Free Thyroxine, Serum: AM Sched. Collection: 07-May-2019 06:00 (05-06 @ 10:38)

## 2019-05-07 NOTE — PROGRESS NOTE ADULT - ASSESSMENT
Assessment  DMT2: 73y Female with DM T2 with hyperglycemia, was on insulin at home started on basal bolus insulin, blood sugars fluctuating, no hypoglycemic episode,  eating meals, compliant with low carb diet, feeling weak.  CAD: on medications, stable, monitored.  Hypothyroidism: On Synthroid 50 mcg po daily, compliant with Synthroid intake, asymptomatic.  HTN: Controlled,  on antihypertensive medications.  SOB: On Tx, oxygen.  Obesity: No strict exercise routines, not on any weight loss program, neither on low calorie diet.          Jillian Banks MD  Cell: [phone]  Office: [phone]

## 2019-05-07 NOTE — CHART NOTE - NSCHARTNOTEFT_GEN_A_CORE
Patient was in room crying and not want to have MR repair aT all.  Dr Gregg will evaluate the TTE results to decide whether she is candidate or not.  Korin FIELD

## 2019-05-07 NOTE — PROGRESS NOTE ADULT - PROBLEM SELECTOR PLAN 2
chest pain is stable. on dual antiplatelet therapy. management per cardiology   per cardiology some point heart cath  : per cardiology : she is on diuretics!  : has severe MR for anjali in AM  : ANJALI reviewed: has severe MR:  5/3: cts evalaution pendin/4: Cont currtn rx:  : cont rx:  : stabke/1  : has severe MR: defer to primary team for further options: no intervention from pulmoanry side:

## 2019-05-07 NOTE — PROGRESS NOTE ADULT - SUBJECTIVE AND OBJECTIVE BOX
Patient seen and examined at bedside  No acute events noted overnight  Case discussed with medical team    HPI:  73YOF with hx of CAD s/p CABG, hypothyroidism, CKD, CHF, HTN, DM p/w worsening ZEE and sob. Patient usually can walk up a few steps before feeling sob, currently feeling sob after walking from living room to kitchen.  She is using 3 pillows to sleep. No cough, fevers. (+) CP, SS and constant, She experienced more ZEE for last two days.  She has no edema in lower extremety.  Last admission to [facility] was 11/18. (24 Apr 2019 10:21)      PAST MEDICAL & SURGICAL HISTORY:  GERD (gastroesophageal reflux disease)  CAD (coronary artery disease): s/p CABG  Hypothyroidism  Hyperlipidemia  Diabetes mellitus, type 2  S/P CABG (coronary artery bypass graft)      azithromycin (Hives; Pruritus)       MEDICATIONS  (STANDING):  ALBUTerol/ipratropium for Nebulization 3 milliLiter(s) Nebulizer every 6 hours  aspirin enteric coated 81 milliGRAM(s) Oral daily  atorvastatin 40 milliGRAM(s) Oral at bedtime  buDESOnide 160 MICROgram(s)/formoterol 4.5 MICROgram(s) Inhaler 2 Puff(s) Inhalation two times a day  buMETAnide 2 milliGRAM(s) Oral daily  chlorhexidine 4% Liquid 1 Application(s) Topical <User Schedule>  dextrose 5%. 1000 milliLiter(s) (50 mL/Hr) IV Continuous <Continuous>  dextrose 50% Injectable 12.5 Gram(s) IV Push once  dextrose 50% Injectable 25 Gram(s) IV Push once  dextrose 50% Injectable 25 Gram(s) IV Push once  docusate sodium 100 milliGRAM(s) Oral two times a day  ergocalciferol 78994 Unit(s) Oral <User Schedule>  gabapentin 100 milliGRAM(s) Oral two times a day  heparin  Injectable 5000 Unit(s) SubCutaneous every 8 hours  hydrALAZINE 37.5 milliGRAM(s) Oral three times a day  influenza   Vaccine 0.5 milliLiter(s) IntraMuscular once  insulin glargine Injectable (LANTUS) 65 Unit(s) SubCutaneous at bedtime  insulin lispro (HumaLOG) corrective regimen sliding scale   SubCutaneous three times a day before meals  insulin lispro (HumaLOG) corrective regimen sliding scale   SubCutaneous at bedtime  insulin lispro Injectable (HumaLOG) 30 Unit(s) SubCutaneous three times a day before meals  insulin NPH human recombinant 20 Unit(s) SubCutaneous before breakfast  insulin NPH human recombinant 20 Unit(s) SubCutaneous at bedtime  isosorbide   dinitrate Tablet (ISORDIL) 10 milliGRAM(s) Oral three times a day  levothyroxine 50 MICROGram(s) Oral daily  metoprolol succinate ER 25 milliGRAM(s) Oral daily  montelukast 10 milliGRAM(s) Oral daily  pantoprazole    Tablet 40 milliGRAM(s) Oral before breakfast  polyethylene glycol 3350 17 Gram(s) Oral two times a day  senna 2 Tablet(s) Oral at bedtime  sodium chloride 0.65% Nasal 1 Spray(s) Both Nostrils three times a day  ticagrelor 90 milliGRAM(s) Oral two times a day    MEDICATIONS  (PRN):  acetaminophen   Tablet .. 650 milliGRAM(s) Oral every 6 hours PRN Mild Pain (1 - 3), Moderate Pain (4 - 6), Severe Pain (7 - 10)  ALBUTerol/ipratropium for Nebulization. 3 milliLiter(s) Nebulizer once PRN Shortness of Breath  dextrose 40% Gel 15 Gram(s) Oral once PRN Blood Glucose LESS THAN 70 milliGRAM(s)/deciliter  diphenhydrAMINE 25 milliGRAM(s) Oral every 4 hours PRN Rash and/or Itching  glucagon  Injectable 1 milliGRAM(s) IntraMuscular once PRN Glucose LESS THAN 70 milligrams/deciliter  ondansetron Injectable 4 milliGRAM(s) IV Push once PRN Nausea and/or Vomiting      REVIEW OF SYSTEMS:  CONSTITUTIONAL: (+) malaise.   EYES: No acute change in vision   ENT:  No tinnitus  NECK: No stiffness  RESPIRATORY: sob. zee. No hemoptysis  CARDIOVASCULAR: No active chest pain, palpitations, syncope  GASTROINTESTINAL: No hematemesis, diarrhea, melena, or hematochezia.  GENITOURINARY: No hematuria  NEUROLOGICAL: No headaches  LYMPH Nodes: No enlarged glands  ENDOCRINE: No heat or cold intolerance	    T(C): 36.3 (05-07-19 @ 09:54), Max: 36.7 (05-06-19 @ 13:15)  HR: 68 (05-07-19 @ 10:09) (68 - 166)  BP: 86/43 (05-07-19 @ 09:54) (86/43 - 106/49)  RR: 20 (05-07-19 @ 09:54) (18 - 24)  SpO2: 99% (05-07-19 @ 10:09) (98% - 100%)    PHYSICAL EXAMINATION:   Constitutional: ill appearance is stable, NAD  HEENT: AT  Neck:  Supple  Respiratory:  Adequate airflow b/l. Not using accessory muscles of respiration.  Cardiovascular: sys murmur, S1 & S2 intact, no R/G, 2+ radial pulses b/l  Gastrointestinal: Soft, obese, ND, normoactive b.s., no organomegaly/RT/rigidity  Extremities: WWP  Neurological:  Alert and awake.  No acute focal motor deficits. Crude sensation intact.     Labs and imaging reviewed    LABS:                        8.3    15.34 )-----------( 379      ( 07 May 2019 07:00 )             27.3     05-07    141  |  107  |  82<H>  ----------------------------<  93  4.1   |  17<L>  |  2.41<H>    Ca    9.2      07 May 2019 07:00  Mg     2.7     05-07      CARDIAC MARKERS ( 06 May 2019 13:33 )  x     / x     / 54 u/L / 4.58 ng/mL / x              CAPILLARY BLOOD GLUCOSE      POCT Blood Glucose.: 93 mg/dL (07 May 2019 08:53)  POCT Blood Glucose.: 84 mg/dL (07 May 2019 04:32)  POCT Blood Glucose.: 160 mg/dL (06 May 2019 21:59)  POCT Blood Glucose.: 216 mg/dL (06 May 2019 17:50)  POCT Blood Glucose.: 394 mg/dL (06 May 2019 12:27)              RADIOLOGY & ADDITIONAL STUDIES:

## 2019-05-08 LAB
ANION GAP SERPL CALC-SCNC: 16 MMO/L — HIGH (ref 7–14)
BUN SERPL-MCNC: 93 MG/DL — HIGH (ref 7–23)
CALCIUM SERPL-MCNC: 9.2 MG/DL — SIGNIFICANT CHANGE UP (ref 8.4–10.5)
CHLORIDE SERPL-SCNC: 103 MMOL/L — SIGNIFICANT CHANGE UP (ref 98–107)
CO2 SERPL-SCNC: 17 MMOL/L — LOW (ref 22–31)
CREAT SERPL-MCNC: 2.56 MG/DL — HIGH (ref 0.5–1.3)
GLUCOSE SERPL-MCNC: 71 MG/DL — SIGNIFICANT CHANGE UP (ref 70–99)
HCT VFR BLD CALC: 26.1 % — LOW (ref 34.5–45)
HGB BLD-MCNC: 8 G/DL — LOW (ref 11.5–15.5)
MAGNESIUM SERPL-MCNC: 2.8 MG/DL — HIGH (ref 1.6–2.6)
MCHC RBC-ENTMCNC: 28.2 PG — SIGNIFICANT CHANGE UP (ref 27–34)
MCHC RBC-ENTMCNC: 30.7 % — LOW (ref 32–36)
MCV RBC AUTO: 91.9 FL — SIGNIFICANT CHANGE UP (ref 80–100)
NRBC # FLD: 0.02 K/UL — SIGNIFICANT CHANGE UP (ref 0–0)
PLATELET # BLD AUTO: 354 K/UL — SIGNIFICANT CHANGE UP (ref 150–400)
PMV BLD: 8.9 FL — SIGNIFICANT CHANGE UP (ref 7–13)
POTASSIUM SERPL-MCNC: 3.8 MMOL/L — SIGNIFICANT CHANGE UP (ref 3.5–5.3)
POTASSIUM SERPL-SCNC: 3.8 MMOL/L — SIGNIFICANT CHANGE UP (ref 3.5–5.3)
RBC # BLD: 2.84 M/UL — LOW (ref 3.8–5.2)
RBC # FLD: 16.8 % — HIGH (ref 10.3–14.5)
SODIUM SERPL-SCNC: 136 MMOL/L — SIGNIFICANT CHANGE UP (ref 135–145)
WBC # BLD: 14.45 K/UL — HIGH (ref 3.8–10.5)
WBC # FLD AUTO: 14.45 K/UL — HIGH (ref 3.8–10.5)

## 2019-05-08 PROCEDURE — 93010 ELECTROCARDIOGRAM REPORT: CPT

## 2019-05-08 PROCEDURE — 99233 SBSQ HOSP IP/OBS HIGH 50: CPT

## 2019-05-08 PROCEDURE — 71045 X-RAY EXAM CHEST 1 VIEW: CPT | Mod: 26

## 2019-05-08 RX ORDER — SODIUM CHLORIDE 9 MG/ML
250 INJECTION INTRAMUSCULAR; INTRAVENOUS; SUBCUTANEOUS ONCE
Qty: 0 | Refills: 0 | Status: COMPLETED | OUTPATIENT
Start: 2019-05-08 | End: 2019-05-08

## 2019-05-08 RX ORDER — NITROGLYCERIN 6.5 MG
0.4 CAPSULE, EXTENDED RELEASE ORAL
Qty: 0 | Refills: 0 | Status: DISCONTINUED | OUTPATIENT
Start: 2019-05-08 | End: 2019-05-09

## 2019-05-08 RX ORDER — NITROGLYCERIN 6.5 MG
0.4 CAPSULE, EXTENDED RELEASE ORAL ONCE
Qty: 0 | Refills: 0 | Status: COMPLETED | OUTPATIENT
Start: 2019-05-08 | End: 2019-05-08

## 2019-05-08 RX ORDER — CALCIUM CARBONATE 500(1250)
2 TABLET ORAL ONCE
Qty: 0 | Refills: 0 | Status: COMPLETED | OUTPATIENT
Start: 2019-05-08 | End: 2019-05-08

## 2019-05-08 RX ADMIN — PANTOPRAZOLE SODIUM 40 MILLIGRAM(S): 20 TABLET, DELAYED RELEASE ORAL at 06:13

## 2019-05-08 RX ADMIN — Medication 12.5 MILLIGRAM(S): at 08:03

## 2019-05-08 RX ADMIN — TICAGRELOR 90 MILLIGRAM(S): 90 TABLET ORAL at 17:50

## 2019-05-08 RX ADMIN — INSULIN GLARGINE 65 UNIT(S): 100 INJECTION, SOLUTION SUBCUTANEOUS at 23:16

## 2019-05-08 RX ADMIN — ISOSORBIDE DINITRATE 10 MILLIGRAM(S): 5 TABLET ORAL at 17:50

## 2019-05-08 RX ADMIN — Medication 0.4 MILLIGRAM(S): at 23:48

## 2019-05-08 RX ADMIN — Medication 3 MILLILITER(S): at 09:37

## 2019-05-08 RX ADMIN — Medication 1 SPRAY(S): at 13:42

## 2019-05-08 RX ADMIN — SODIUM CHLORIDE 500 MILLILITER(S): 9 INJECTION INTRAMUSCULAR; INTRAVENOUS; SUBCUTANEOUS at 23:47

## 2019-05-08 RX ADMIN — Medication 50 MICROGRAM(S): at 06:17

## 2019-05-08 RX ADMIN — MONTELUKAST 10 MILLIGRAM(S): 4 TABLET, CHEWABLE ORAL at 13:41

## 2019-05-08 RX ADMIN — Medication 3 MILLILITER(S): at 04:14

## 2019-05-08 RX ADMIN — BUMETANIDE 2 MILLIGRAM(S): 0.25 INJECTION INTRAMUSCULAR; INTRAVENOUS at 06:14

## 2019-05-08 RX ADMIN — HEPARIN SODIUM 5000 UNIT(S): 5000 INJECTION INTRAVENOUS; SUBCUTANEOUS at 13:41

## 2019-05-08 RX ADMIN — SENNA PLUS 2 TABLET(S): 8.6 TABLET ORAL at 21:57

## 2019-05-08 RX ADMIN — CHLORHEXIDINE GLUCONATE 1 APPLICATION(S): 213 SOLUTION TOPICAL at 13:42

## 2019-05-08 RX ADMIN — Medication 25 MILLIGRAM(S): at 18:47

## 2019-05-08 RX ADMIN — Medication 3 MILLILITER(S): at 15:48

## 2019-05-08 RX ADMIN — GABAPENTIN 100 MILLIGRAM(S): 400 CAPSULE ORAL at 06:13

## 2019-05-08 RX ADMIN — BUDESONIDE AND FORMOTEROL FUMARATE DIHYDRATE 2 PUFF(S): 160; 4.5 AEROSOL RESPIRATORY (INHALATION) at 08:55

## 2019-05-08 RX ADMIN — HUMAN INSULIN 20 UNIT(S): 100 INJECTION, SUSPENSION SUBCUTANEOUS at 08:55

## 2019-05-08 RX ADMIN — POLYETHYLENE GLYCOL 3350 17 GRAM(S): 17 POWDER, FOR SOLUTION ORAL at 17:50

## 2019-05-08 RX ADMIN — ATORVASTATIN CALCIUM 40 MILLIGRAM(S): 80 TABLET, FILM COATED ORAL at 21:57

## 2019-05-08 RX ADMIN — Medication 0.4 MILLIGRAM(S): at 23:36

## 2019-05-08 RX ADMIN — HEPARIN SODIUM 5000 UNIT(S): 5000 INJECTION INTRAVENOUS; SUBCUTANEOUS at 21:58

## 2019-05-08 RX ADMIN — Medication 1 SPRAY(S): at 06:12

## 2019-05-08 RX ADMIN — TICAGRELOR 90 MILLIGRAM(S): 90 TABLET ORAL at 06:13

## 2019-05-08 RX ADMIN — Medication 81 MILLIGRAM(S): at 13:41

## 2019-05-08 RX ADMIN — ISOSORBIDE DINITRATE 10 MILLIGRAM(S): 5 TABLET ORAL at 08:04

## 2019-05-08 RX ADMIN — Medication 3 MILLILITER(S): at 22:09

## 2019-05-08 RX ADMIN — Medication 100 MILLIGRAM(S): at 17:50

## 2019-05-08 RX ADMIN — Medication 2 TABLET(S): at 23:33

## 2019-05-08 RX ADMIN — BUDESONIDE AND FORMOTEROL FUMARATE DIHYDRATE 2 PUFF(S): 160; 4.5 AEROSOL RESPIRATORY (INHALATION) at 21:57

## 2019-05-08 RX ADMIN — BUMETANIDE 2 MILLIGRAM(S): 0.25 INJECTION INTRAMUSCULAR; INTRAVENOUS at 18:47

## 2019-05-08 RX ADMIN — Medication 0.4 MILLIGRAM(S): at 23:16

## 2019-05-08 RX ADMIN — SODIUM CHLORIDE 500 MILLILITER(S): 9 INJECTION INTRAMUSCULAR; INTRAVENOUS; SUBCUTANEOUS at 23:16

## 2019-05-08 RX ADMIN — Medication 100 MILLIGRAM(S): at 06:13

## 2019-05-08 RX ADMIN — Medication 30 UNIT(S): at 13:39

## 2019-05-08 RX ADMIN — Medication 25 MILLIGRAM(S): at 08:55

## 2019-05-08 RX ADMIN — Medication 30 UNIT(S): at 08:55

## 2019-05-08 RX ADMIN — HEPARIN SODIUM 5000 UNIT(S): 5000 INJECTION INTRAVENOUS; SUBCUTANEOUS at 06:19

## 2019-05-08 RX ADMIN — Medication 1 SPRAY(S): at 21:57

## 2019-05-08 RX ADMIN — GABAPENTIN 100 MILLIGRAM(S): 400 CAPSULE ORAL at 17:51

## 2019-05-08 NOTE — PROGRESS NOTE ADULT - ASSESSMENT
73YOF with hx of CAD s/p CABG, hypothyroidism, CKD, CHF, HTN, DM p/w worsening ZEE and sob. Patient usually can walk up a few steps before feeling sob, currently feeling sob after walking from living room to kitchen.  She is using 3 pillows to sleep. No cough, fevers. (+) CP, SS and constant, She experienced more ZEE for last two days.  She has no edema in lower extremity.  Last admission to [facility] was 11/18. (24 Apr 2019 10:21)    ER vss.  Pt afebrile.  WBC 8.7 --> 10.7.  UA (-), RVP (-).  4/25 cxr with L hazy retrocardiac opacity.   Pt found to have NSTEMI and gross fluid overload, started on IV lasix.  She is pending [facility].        ID consult called for further abx managment and evaluation for pna.       Recommend:    - Pt with ZEE/SOB, (+) NSTEMI.  Cxr with L hazy opacity.  Pt w/o cough or fever to suggest pna on clinical basis.  CT chest shows mosaic attentuation/pneumonitis/atypical infection.       - Azithro d/c'd due to allergic reaction. Pt completed 7 day course of levaquin  for possible superimposed pna. Completed on 5/2.       - Pt transferred to CCU, and back to telemetry,  for continued managment of acute decompensated CHF, now on telemetry.   TTE with severe LV dysfunction, severe MR and PH.   LV shows ischemic MR.  For MR clip referral    - WBC trending down - likely due to steroids in the setting of treatment for gout.  No diarrhea to suggest Cdiff.  Cont to monitor.    No acute ID issues at this time.      Joselin Roman  [phone]

## 2019-05-08 NOTE — PROGRESS NOTE ADULT - PROBLEM SELECTOR PLAN 1
to me ct scan looks more like air trapping: SHE IS NOT WHEEZING: BUT DON'T HAVE HER pft : WOULD SUGGEST TO STARTS SYMBICORT AS WELL AS SINGULAIR Daily: doubt pneumonia:   4/30: she seems to eb doing better: cont symbicort as wellas singulair:  5/2: She has been stable: no SOB : being diuresed: Has severe MR with PFO with left to rigth flow: Cont BD  5/3: maris antibtiocs now: cont diuresis: for cts evaluation for alonso clip: Cont BD with some evidence of air trapping on ct chest1  5/4: no surgery per family : conservative management:  5/5: stable: no SOB at rest  5/6: she is doing ok : no issues overnight: cont current management:  5/7: cont current therapy:  5/8: Cont iv diuresis: SHE HAS BEEN ON bd FOR SUSPECTED UNDERLYING AIR TRAPPING: NO WHEEZING!  Now she wants to talk to cts

## 2019-05-08 NOTE — CHART NOTE - NSCHARTNOTEFT_GEN_A_CORE
Pt c/o 10/10 CP, lying in bed    ICU Vital Signs Last 24 Hrs  T(C): 36.7 (08 May 2019 22:52), Max: 36.7 (08 May 2019 22:52)  T(F): 98 (08 May 2019 22:52), Max: 98 (08 May 2019 22:52)  HR: 86 (08 May 2019 22:52) (72 - 89)  BP: 108/58 (08 May 2019 22:52) (91/47 - 115/54)  BP(mean): --  ABP: --  ABP(mean): --  RR: 16 (08 May 2019 22:52) (16 - 20)  SpO2: 100% (08 May 2019 22:52) (98% - 100%)      EKG with SR 87bpm, no signs of ischemia  CXR done earlier today showed clear lungs Pt c/o 10/10 CP and SOB, lying in bed, O2 NC 4L in place, Hx CHF, EF 37%    ICU Vital Signs Last 24 Hrs  T(C): 36.7 (08 May 2019 22:52), Max: 36.7 (08 May 2019 22:52)  T(F): 98 (08 May 2019 22:52), Max: 98 (08 May 2019 22:52)  HR: 86 (08 May 2019 22:52) (72 - 89)  BP: 108/58 (08 May 2019 22:52) (91/47 - 115/54)  BP(mean): --  ABP: --  ABP(mean): --  RR: 16 (08 May 2019 22:52) (16 - 20)  SpO2: 100% (08 May 2019 22:52) (98% - 100%)      EKG with SR 87bpm, no signs of ischemia  CXR done earlier today showed clear lungs    A/P: NTG SL x3 given with no relief, Hypotension with SBP in 90s, so IVF given  TUMS 2 tabs given  CE, CBC, BMP and Ddimer sent  Pt continues to have CP and SOB, Cardiology called for assistance, [id number], they will come evaluate    ADDEDum: Pt sneezed bloody mucous, saline spray recommended, monitor for further epistaxis H/H Pt c/o 10/10 CP and SOB, no diaphoresis, lying in bed, O2 NC 4L in place, Hx CHF, EF 37%, severe MR    ICU Vital Signs Last 24 Hrs  T(C): 36.7 (08 May 2019 22:52), Max: 36.7 (08 May 2019 22:52)  T(F): 98 (08 May 2019 22:52), Max: 98 (08 May 2019 22:52)  HR: 86 (08 May 2019 22:52) (72 - 89)  BP: 108/58 (08 May 2019 22:52) (91/47 - 115/54)  BP(mean): --  ABP: --  ABP(mean): --  RR: 16 (08 May 2019 22:52) (16 - 20)  SpO2: 100% (08 May 2019 22:52) (98% - 100%)      EKG with SR 87bpm, no signs of ischemia  CXR done earlier today showed clear lungs  Lungs CTA, no wheezing  Card s1s2    A/P: NTG SL x3 given with no relief, Hypotension with SBP in 90s, so IVF given  TUMS 2 tabs given  CE, CBC, BMP and Ddimer sent  Pt continues to have CP and SOB, Cardiology called for assistance, [phone], they will come evaluate    ADDEDum: Pt sneezed bloody mucous, saline spray recommended, monitor for further epistaxis H/H

## 2019-05-08 NOTE — PROGRESS NOTE ADULT - ASSESSMENT
74 YO F w/o h/o CAD, s/p CABG Systolic CHF, CKD 3b, who p/w sob and pino.    - Acute on Chronic Systolic CHF Exacerbation, and Severe Mitral Valve Regurgitation:      - severe systolic dysfunction with severe MVR - pt/family interested in mitral valve intervention      ** F/u CT/Structural Heart for MV intervention      - hydralazine dosage decreased yesterday for bp support.  Low normal bp.  Will monitor closely and adjust rx per consultants.       - PT      - cards and all consultants management greatly appreciated       - tele     - Acute Gout Flare:       - improving. F/u rheum for management     - NSTEMI:      - progressing well. continue asa/brilinta/statin      - adjust management prn      - tele        - MERVIN on CKD      - stable renal function.  trend renal function.      - optimize comorbidities. Avoid nephrotoxic rx     - DM II with long term complications of hyperglycemia, hypoglcyemia, and nephropathy   - c/w dm rx, as above    - monitor FS closely given hypoglycemia       - complicated by poor nutritional compliance (pt fed food from outside the hospital)       - c/w dm rx      - dm education

## 2019-05-08 NOTE — CHART NOTE - NSCHARTNOTEFT_GEN_A_CORE
Source: Patient [ ]    Family [ ]     other [x ] RN, chart review    Length Of Stay follow-up. 72 y/o F here with CHF exacerbation and severe MR with family wanting surgical intervention. Per RN, PO intake >75% of meals. Pt was hyperglycemic yesterday with FS >300s. Fs are better today. No GI distress (nausea/vomiting/diarrhea/constipation.)     Current Diet : consistent carbohydrate, DASH, 1500 mL fluids   PO intake:  < 50% [ ] 50-75% [ ]   % [x ]  other :    Current Weight:   -130 pounds   5/8: 141 pounds   5/6: 124.3 pounds   5/4: 121.6 pounds   4/30: 125.8 pounds   4/25: 132 pounds     __________________ Pertinent Medications__________________   MEDICATIONS  (STANDING):  ALBUTerol/ipratropium for Nebulization 3 milliLiter(s) Nebulizer every 6 hours  aspirin enteric coated 81 milliGRAM(s) Oral daily  atorvastatin 40 milliGRAM(s) Oral at bedtime  buDESOnide 160 MICROgram(s)/formoterol 4.5 MICROgram(s) Inhaler 2 Puff(s) Inhalation two times a day  buMETAnide Injectable 2 milliGRAM(s) IV Push two times a day  chlorhexidine 4% Liquid 1 Application(s) Topical <User Schedule>  dextrose 5%. 1000 milliLiter(s) (50 mL/Hr) IV Continuous <Continuous>  dextrose 50% Injectable 12.5 Gram(s) IV Push once  dextrose 50% Injectable 25 Gram(s) IV Push once  dextrose 50% Injectable 25 Gram(s) IV Push once  docusate sodium 100 milliGRAM(s) Oral two times a day  ergocalciferol 90065 Unit(s) Oral <User Schedule>  gabapentin 100 milliGRAM(s) Oral two times a day  heparin  Injectable 5000 Unit(s) SubCutaneous every 8 hours  hydrALAZINE 25 milliGRAM(s) Oral three times a day  influenza   Vaccine 0.5 milliLiter(s) IntraMuscular once  insulin glargine Injectable (LANTUS) 65 Unit(s) SubCutaneous at bedtime  insulin lispro (HumaLOG) corrective regimen sliding scale   SubCutaneous three times a day before meals  insulin lispro (HumaLOG) corrective regimen sliding scale   SubCutaneous at bedtime  insulin lispro Injectable (HumaLOG) 30 Unit(s) SubCutaneous three times a day before meals  isosorbide   dinitrate Tablet (ISORDIL) 10 milliGRAM(s) Oral three times a day  levothyroxine 50 MICROGram(s) Oral daily  metoprolol succinate ER 12.5 milliGRAM(s) Oral daily  montelukast 10 milliGRAM(s) Oral daily  pantoprazole    Tablet 40 milliGRAM(s) Oral before breakfast  polyethylene glycol 3350 17 Gram(s) Oral two times a day  senna 2 Tablet(s) Oral at bedtime  sodium chloride 0.65% Nasal 1 Spray(s) Both Nostrils three times a day  ticagrelor 90 milliGRAM(s) Oral two times a day    MEDICATIONS  (PRN):  acetaminophen   Tablet .. 650 milliGRAM(s) Oral every 6 hours PRN Mild Pain (1 - 3), Moderate Pain (4 - 6), Severe Pain (7 - 10)  ALBUTerol/ipratropium for Nebulization. 3 milliLiter(s) Nebulizer once PRN Shortness of Breath  dextrose 40% Gel 15 Gram(s) Oral once PRN Blood Glucose LESS THAN 70 milliGRAM(s)/deciliter  diphenhydrAMINE 25 milliGRAM(s) Oral every 4 hours PRN Rash and/or Itching  glucagon  Injectable 1 milliGRAM(s) IntraMuscular once PRN Glucose LESS THAN 70 milligrams/deciliter  ondansetron Injectable 4 milliGRAM(s) IV Push once PRN Nausea and/or Vomiting      __________________ Pertinent Labs__________________   05-08 Na136 mmol/L Glu 71 mg/dL K+ 3.8 mmol/L Cr  2.56 mg/dL<H> BUN 93 mg/dL<H> 05-03 Phos 3.7 mg/dL 05-03 Alb 3.8 g/dL 04-25 [id number] 7.3 %<H> 04-24 Chol 127 mg/dL LDL 67 mg/dL HDL 22 mg/dL<L> Trig 258 mg/dL<H>    CAPILLARY BLOOD GLUCOSE      POCT Blood Glucose.: 170 mg/dL (08 May 2019 13:15)  POCT Blood Glucose.: 70 mg/dL (08 May 2019 12:53)  POCT Blood Glucose.: 70 mg/dL (08 May 2019 12:34)  POCT Blood Glucose.: 74 mg/dL (08 May 2019 08:41)  POCT Blood Glucose.: 178 mg/dL (07 May 2019 21:39)  POCT Blood Glucose.: 245 mg/dL (07 May 2019 17:43)        Skin: No edema, no pressure injuries noted.     Estimated Needs:   [x ] no change since previous assessment  [ ] recalculated:       Previous Nutrition Diagnosis: Altered nutrition-related lab values     Nutrition Diagnosis is [x ] ongoing- elevated POCT values   [ ] resolved [ ] not applicable     New Nutrition Diagnosis: [x ] not applicable    Interventions:     Recommend    1. Continue current diet regimen.   2. RDN remains available as needed for continued diet education.      Monitoring and Evaluation:     [x ] PO intake [x ] Tolerance to diet prescription [x ] weights [ x] follow up per protocol  [ ] other:

## 2019-05-08 NOTE — PROGRESS NOTE ADULT - SUBJECTIVE AND OBJECTIVE BOX
Patient seen and examined at bedside  c/o sob and zee  Case discussed with medical team    HPI:  73YOF with hx of CAD s/p CABG, hypothyroidism, CKD, CHF, HTN, DM p/w worsening ZEE and sob. Patient usually can walk up a few steps before feeling sob, currently feeling sob after walking from living room to kitchen.  She is using 3 pillows to sleep. No cough, fevers. (+) CP, SS and constant, She experienced more ZEE for last two days.  She has no edema in lower extremety.  Last admission to [facility] was 11/18. (24 Apr 2019 10:21)      PAST MEDICAL & SURGICAL HISTORY:  GERD (gastroesophageal reflux disease)  CAD (coronary artery disease): s/p CABG  Hypothyroidism  Hyperlipidemia  Diabetes mellitus, type 2  S/P CABG (coronary artery bypass graft)      azithromycin (Hives; Pruritus)       MEDICATIONS  (STANDING):  ALBUTerol/ipratropium for Nebulization 3 milliLiter(s) Nebulizer every 6 hours  aspirin enteric coated 81 milliGRAM(s) Oral daily  atorvastatin 40 milliGRAM(s) Oral at bedtime  buDESOnide 160 MICROgram(s)/formoterol 4.5 MICROgram(s) Inhaler 2 Puff(s) Inhalation two times a day  buMETAnide 2 milliGRAM(s) Oral daily  chlorhexidine 4% Liquid 1 Application(s) Topical <User Schedule>  dextrose 5%. 1000 milliLiter(s) (50 mL/Hr) IV Continuous <Continuous>  dextrose 50% Injectable 12.5 Gram(s) IV Push once  dextrose 50% Injectable 25 Gram(s) IV Push once  dextrose 50% Injectable 25 Gram(s) IV Push once  docusate sodium 100 milliGRAM(s) Oral two times a day  ergocalciferol 61539 Unit(s) Oral <User Schedule>  gabapentin 100 milliGRAM(s) Oral two times a day  heparin  Injectable 5000 Unit(s) SubCutaneous every 8 hours  hydrALAZINE 37.5 milliGRAM(s) Oral three times a day  influenza   Vaccine 0.5 milliLiter(s) IntraMuscular once  insulin glargine Injectable (LANTUS) 65 Unit(s) SubCutaneous at bedtime  insulin lispro (HumaLOG) corrective regimen sliding scale   SubCutaneous three times a day before meals  insulin lispro (HumaLOG) corrective regimen sliding scale   SubCutaneous at bedtime  insulin lispro Injectable (HumaLOG) 30 Unit(s) SubCutaneous three times a day before meals  insulin NPH human recombinant 20 Unit(s) SubCutaneous before breakfast  insulin NPH human recombinant 20 Unit(s) SubCutaneous at bedtime  isosorbide   dinitrate Tablet (ISORDIL) 10 milliGRAM(s) Oral three times a day  levothyroxine 50 MICROGram(s) Oral daily  metoprolol succinate ER 25 milliGRAM(s) Oral daily  montelukast 10 milliGRAM(s) Oral daily  pantoprazole    Tablet 40 milliGRAM(s) Oral before breakfast  polyethylene glycol 3350 17 Gram(s) Oral two times a day  senna 2 Tablet(s) Oral at bedtime  sodium chloride 0.65% Nasal 1 Spray(s) Both Nostrils three times a day  ticagrelor 90 milliGRAM(s) Oral two times a day    MEDICATIONS  (PRN):  acetaminophen   Tablet .. 650 milliGRAM(s) Oral every 6 hours PRN Mild Pain (1 - 3), Moderate Pain (4 - 6), Severe Pain (7 - 10)  ALBUTerol/ipratropium for Nebulization. 3 milliLiter(s) Nebulizer once PRN Shortness of Breath  dextrose 40% Gel 15 Gram(s) Oral once PRN Blood Glucose LESS THAN 70 milliGRAM(s)/deciliter  diphenhydrAMINE 25 milliGRAM(s) Oral every 4 hours PRN Rash and/or Itching  glucagon  Injectable 1 milliGRAM(s) IntraMuscular once PRN Glucose LESS THAN 70 milligrams/deciliter  ondansetron Injectable 4 milliGRAM(s) IV Push once PRN Nausea and/or Vomiting      REVIEW OF SYSTEMS:  CONSTITUTIONAL: (+) malaise.   EYES: No acute change in vision   ENT:  No tinnitus  NECK: No stiffness  RESPIRATORY: sob. zee. No hemoptysis  CARDIOVASCULAR: No active chest pain, palpitations, syncope  GASTROINTESTINAL: No hematemesis, diarrhea, melena, or hematochezia.  GENITOURINARY: No hematuria  NEUROLOGICAL: No headaches  LYMPH Nodes: No enlarged glands  ENDOCRINE: No heat or cold intolerance	    Vital Signs Last 24 Hrs  T(C): 36.4 (08 May 2019 08:02), Max: 36.6 (07 May 2019 21:09)  T(F): 97.5 (08 May 2019 08:02), Max: 97.9 (07 May 2019 21:09)  HR: 72 (08 May 2019 09:37) (68 - 84)  BP: 98/52 (08 May 2019 08:54) (92/47 - 104/51)  BP(mean): --  RR: 16 (08 May 2019 08:54) (16 - 20)  SpO2: 99% (08 May 2019 09:37) (98% - 100%)    PHYSICAL EXAMINATION:   Constitutional: ill appearance is stable, NAD  HEENT: AT  Neck:  Supple  Respiratory:  Adequate airflow b/l. Not using accessory muscles of respiration.  Cardiovascular: sys murmur, S1 & S2 intact, no R/G, 2+ radial pulses b/l  Gastrointestinal: Soft, obese, ND, normoactive b.s., no organomegaly/RT/rigidity  Extremities: WWP  Neurological:  Alert and awake.  No acute focal motor deficits. Crude sensation intact.     Labs and imaging reviewed  Complete Blood Count (05.08.19 @ 07:30)    WBC Count: 14.45 K/uL    RBC Count: 2.84 M/uL    Hemoglobin: 8.0 g/dL    Hematocrit: 26.1 %    Mean Cell Volume: 91.9 fL    Mean Cell Hemoglobin: 28.2 pg    Mean Cell Hemoglobin Conc: 30.7 %    Red Cell Distrib Width: 16.8 %    Platelet Count - Automated: 354 K/uL    MPV: 8.9 fl    Nucleated RBC #: 0.02 K/uL

## 2019-05-08 NOTE — PROGRESS NOTE ADULT - SUBJECTIVE AND OBJECTIVE BOX
Infectious Diseases progress note:    Subjective: Events noted. Pt NAD, afebrile.  WBC improved today    ROS:  CONSTITUTIONAL:  No fever, chills, rigors  CARDIOVASCULAR:  No chest pain or palpitations  RESPIRATORY:   No SOB, cough, dyspnea on exertion.  No wheezing  GASTROINTESTINAL:  No abd pain, N/V, diarrhea/constipation  EXTREMITIES:  No swelling or joint pain  GENITOURINARY:  No burning on urination, increased frequency or urgency.  No flank pain  NEUROLOGIC:  No HA, visual disturbances  SKIN: No rashes    Allergies    azithromycin (Hives; Pruritus)    Intolerances        ANTIBIOTICS/RELEVANT:  antimicrobials    immunologic:  influenza   Vaccine 0.5 milliLiter(s) IntraMuscular once    OTHER:  acetaminophen   Tablet .. 650 milliGRAM(s) Oral every 6 hours PRN  ALBUTerol/ipratropium for Nebulization 3 milliLiter(s) Nebulizer every 6 hours  ALBUTerol/ipratropium for Nebulization. 3 milliLiter(s) Nebulizer once PRN  aspirin enteric coated 81 milliGRAM(s) Oral daily  atorvastatin 40 milliGRAM(s) Oral at bedtime  buDESOnide 160 MICROgram(s)/formoterol 4.5 MICROgram(s) Inhaler 2 Puff(s) Inhalation two times a day  buMETAnide Injectable 2 milliGRAM(s) IV Push two times a day  chlorhexidine 4% Liquid 1 Application(s) Topical <User Schedule>  dextrose 40% Gel 15 Gram(s) Oral once PRN  dextrose 5%. 1000 milliLiter(s) IV Continuous <Continuous>  dextrose 50% Injectable 12.5 Gram(s) IV Push once  dextrose 50% Injectable 25 Gram(s) IV Push once  dextrose 50% Injectable 25 Gram(s) IV Push once  diphenhydrAMINE 25 milliGRAM(s) Oral every 4 hours PRN  docusate sodium 100 milliGRAM(s) Oral two times a day  ergocalciferol 61497 Unit(s) Oral <User Schedule>  gabapentin 100 milliGRAM(s) Oral two times a day  glucagon  Injectable 1 milliGRAM(s) IntraMuscular once PRN  heparin  Injectable 5000 Unit(s) SubCutaneous every 8 hours  hydrALAZINE 25 milliGRAM(s) Oral three times a day  insulin glargine Injectable (LANTUS) 65 Unit(s) SubCutaneous at bedtime  insulin lispro (HumaLOG) corrective regimen sliding scale   SubCutaneous three times a day before meals  insulin lispro (HumaLOG) corrective regimen sliding scale   SubCutaneous at bedtime  insulin lispro Injectable (HumaLOG) 30 Unit(s) SubCutaneous three times a day before meals  isosorbide   dinitrate Tablet (ISORDIL) 10 milliGRAM(s) Oral three times a day  levothyroxine 50 MICROGram(s) Oral daily  metoprolol succinate ER 12.5 milliGRAM(s) Oral daily  montelukast 10 milliGRAM(s) Oral daily  ondansetron Injectable 4 milliGRAM(s) IV Push once PRN  pantoprazole    Tablet 40 milliGRAM(s) Oral before breakfast  polyethylene glycol 3350 17 Gram(s) Oral two times a day  senna 2 Tablet(s) Oral at bedtime  sodium chloride 0.65% Nasal 1 Spray(s) Both Nostrils three times a day  ticagrelor 90 milliGRAM(s) Oral two times a day      Objective:  Vital Signs Last 24 Hrs  T(C): 36.3 (08 May 2019 13:38), Max: 36.6 (07 May 2019 21:09)  T(F): 97.3 (08 May 2019 13:38), Max: 97.9 (07 May 2019 21:09)  HR: 75 (08 May 2019 15:48) (68 - 84)  BP: 109/54 (08 May 2019 13:38) (92/47 - 109/54)  BP(mean): --  RR: 20 (08 May 2019 13:38) (16 - 20)  SpO2: 98% (08 May 2019 15:48) (98% - 100%)    PHYSICAL EXAM:  Constitutional:NAD  Eyes:MOHSEN, EOMI  Ear/Nose/Throat: no thrush, mucositis.  Moist mucous membranes	  Neck:no JVD, no lymphadenopathy, supple  Respiratory: CTA maribel  Cardiovascular: S1S2 RRR, no murmurs  Gastrointestinal:soft, nontender,  nondistended (+) BS  Extremities:no e/e/c  Skin:  no rashes, open wounds or ulcerations        LABS:                        8.0    14.45 )-----------( 354      ( 08 May 2019 07:30 )             26.1     05-08    136  |  103  |  93<H>  ----------------------------<  71  3.8   |  17<L>  |  2.56<H>    Ca    9.2      08 May 2019 07:30  Mg     2.8     05-08            Procalcitonin, Serum: 0.13 (04-26 @ 11:36)                    MICROBIOLOGY:          RADIOLOGY & ADDITIONAL STUDIES:

## 2019-05-08 NOTE — PROGRESS NOTE ADULT - ASSESSMENT
72 y/o female with pmhx of HTN, DM, CAD s/p CABG, HFrEF (LVEF 25%) severe MR, severe pulm HTN comes in with cough and SOB.   CT chest showed ground glass opacities, patient was started on IV zithromax, developed rash, now on Levaquin She was admitted to telemetry was given a dose of lopressor, patient became hypotensive and went into respiratory distress, patient was then moved to CCU. HF was consulted to help manage this patient who is SOB and is hypotensive. She admits to SOB at rest, orthopnea, cough. Denies fevers and sick contacts. No CP, palpitations, dizziness.       Acute on chronic systolic heart failure.      Still volume overloaded   Repeat CXR today  Continue  Bumex 2 mg IV BID  Not on ACEI/ARB/ARNI due to MERVIN.   Continue hydral  to 25 mg po TID, isordil 10 mg po TID, Toprol 1.25 mg po qd.  Will uptitrate as B/Ps allow   Strict I/O and daily standing weights.   LV. Severe posterior MR. Will refer for mitral clip eval if pt and family desire, the risks (renal failure, inadeqaute sx improvement) probably outweigh the benefits.           CAD (coronary artery disease).      continue ASA 81 mg po qd   Brilinta 90 me po BID. 74 y/o female with pmhx of HTN, DM, CAD s/p CABG, HFrEF (LVEF 25%) severe MR, severe pulm HTN comes in with cough and SOB.   CT chest showed ground glass opacities, patient was started on IV zithromax, developed rash, now on Levaquin She was admitted to telemetry was given a dose of lopressor, patient became hypotensive and went into respiratory distress, patient was then moved to CCU. HF was consulted to help manage this patient who is SOB and is hypotensive. She admits to SOB at rest, orthopnea, cough. Denies fevers and sick contacts. No CP, palpitations, dizziness.       Acute on chronic systolic heart failure.      Still volume overloaded   Repeat CXR today  Continue  Bumex 2 mg IV BID  Not on ACEI/ARB/ARNI due to MERVIN.   Continue hydral  to 25 mg po TID, isordil 10 mg po TID, Toprol 12.5 mg po qd.  Will uptitrate as B/Ps allow   Strict I/O and daily standing weights.   LV. Severe posterior MR. Will refer for mitral clip eval if pt and family desire, the risks (renal failure, inadeqaute sx improvement) probably outweigh the benefits.           CAD (coronary artery disease).      continue ASA 81 mg po qd   Brilinta 90 me po BID.

## 2019-05-08 NOTE — PROGRESS NOTE ADULT - ASSESSMENT
Assessment  DMT2: 73y Female with DM T2 with hyperglycemia, was on insulin at home started on basal bolus insulin, blood sugars trending down, she is ambulating with help, no hypoglycemic episode,  eating meals, compliant with low carb diet, feeling weak.  CAD: on medications, stable, monitored.  Hypothyroidism: On Synthroid 50 mcg po daily, compliant with Synthroid intake, asymptomatic.  HTN: Controlled,  on antihypertensive medications.  SOB: On Tx, oxygen.  Obesity: No strict exercise routines, not on any weight loss program, neither on low calorie diet.          Jillian Banks MD  Cell: [phone]  Office: [phone]

## 2019-05-08 NOTE — PROGRESS NOTE ADULT - ATTENDING COMMENTS
She feels tired today.  I spoke to the son at length yesterday. I have reached out to the structural heart team to discuss MV clip.  BUN and SCr slightly up. Continue Bumex 2 mg iv bid, but may switch to po if renal profile worsens.

## 2019-05-08 NOTE — PROGRESS NOTE ADULT - ASSESSMENT
73YOF with hx of CAD s/p CABG, hypothyroidism, CKD, CHF, HTN, DM p/w worsening ZEE and sob.    A/p  MERVIN  Etiology?  Likely sec to CHF  Renal function slightly worsen  bumex increased to 2mg bid iv 5/7, clinically slightly better. diuresing per cardio  monitor bmp  avoid nephrotoxic agents  **If pt planned for cardiac cath , pt is 26% risk of developing NGHIA and 1.09% risk of requiring RRT. In view of fluid overload status, no IVF prior to cath. Recommend to hold morning dose of on diuretic  on the day of procedure    CKD stage 3  baseline cr 1.5-1.8  likely sec to HTN/DM/chf  elevated PTH, vit d insufficieny, continue vit d 67574 QW x 4 dose   phos optimal    CHF  monitor daily weight and i/o  diuretics per cardio  f/u cardio

## 2019-05-08 NOTE — PROGRESS NOTE ADULT - SUBJECTIVE AND OBJECTIVE BOX
Subjective: Still SOB    Medications:  acetaminophen   Tablet .. 650 milliGRAM(s) Oral every 6 hours PRN  ALBUTerol/ipratropium for Nebulization 3 milliLiter(s) Nebulizer every 6 hours  ALBUTerol/ipratropium for Nebulization. 3 milliLiter(s) Nebulizer once PRN  aspirin enteric coated 81 milliGRAM(s) Oral daily  atorvastatin 40 milliGRAM(s) Oral at bedtime  buDESOnide 160 MICROgram(s)/formoterol 4.5 MICROgram(s) Inhaler 2 Puff(s) Inhalation two times a day  buMETAnide Injectable 2 milliGRAM(s) IV Push two times a day  diphenhydrAMINE 25 milliGRAM(s) Oral every 4 hours PRN  docusate sodium 100 milliGRAM(s) Oral two times a day  ergocalciferol 76458 Unit(s) Oral <User Schedule>  gabapentin 100 milliGRAM(s) Oral two times a day  heparin  Injectable 5000 Unit(s) SubCutaneous every 8 hours  hydrALAZINE 25 milliGRAM(s) Oral three times a day  influenza   Vaccine 0.5 milliLiter(s) IntraMuscular once  insulin glargine Injectable (LANTUS) 65 Unit(s) SubCutaneous at bedtime  insulin lispro (HumaLOG) corrective regimen sliding scale   SubCutaneous three times a day before meals  insulin lispro (HumaLOG) corrective regimen sliding scale   SubCutaneous at bedtime  insulin lispro Injectable (HumaLOG) 30 Unit(s) SubCutaneous three times a day before meals  insulin NPH human recombinant 20 Unit(s) SubCutaneous before breakfast  insulin NPH human recombinant 20 Unit(s) SubCutaneous at bedtime  isosorbide   dinitrate Tablet (ISORDIL) 10 milliGRAM(s) Oral three times a day  levothyroxine 50 MICROGram(s) Oral daily  metoprolol succinate ER 12.5 milliGRAM(s) Oral daily  montelukast 10 milliGRAM(s) Oral daily  ondansetron Injectable 4 milliGRAM(s) IV Push once PRN  pantoprazole    Tablet 40 milliGRAM(s) Oral before breakfast  polyethylene glycol 3350 17 Gram(s) Oral two times a day  senna 2 Tablet(s) Oral at bedtime  sodium chloride 0.65% Nasal 1 Spray(s) Both Nostrils three times a day  ticagrelor 90 milliGRAM(s) Oral two times a day      Physical Exam:    Vitals:  T(C): 36.4 (19 @ 08:02), Max: 36.6 (19 @ 21:09)  HR: 72 (19 @ 09:37) (68 - 84)  BP: 98/52 (19 @ 08:54) (92/47 - 104/51)  RR: 16 (19 @ 08:54) (16 - 20)  SpO2: 99% (19 @ 09:37) (98% - 100%)    Vital Signs Last 24 Hrs  T(C): 36.4 (08 May 2019 08:02), Max: 36.6 (07 May 2019 21:09)  T(F): 97.5 (08 May 2019 08:02), Max: 97.9 (07 May 2019 21:09)  HR: 72 (08 May 2019 09:37) (68 - 84)  BP: 98/52 (08 May 2019 08:54) (92/47 - 104/51)  RR: 16 (08 May 2019 08:54) (16 - 20)  SpO2: 99% (08 May 2019 09:37) (98% - 100%)    Daily     Daily Weight in k (08 May 2019 03:16)    I&O's Summary    07 May 2019 07:01  -  08 May 2019 07:00  --------------------------------------------------------  IN: 460 mL / OUT: 800 mL / NET: -340 mL        General: Uncomfortable, still SOB  Neck: JVP elevated. No masses  Chest: Dec B/S with fine crackles bilaterally  CV: S1 and S2 RRR. No murmurs. Radial pulses normal.  Abdomen: Soft, non-distended, non-tender  Skin: No rashes or skin breakdown  Ext: No edema, warm   Neurology: Alert and oriented times three. Sensation intact  Psych: Affect normal    Labs:                        8.0    14.45 )-----------( 354      ( 08 May 2019 07:30 )             26.1     05-    136  |  103  |  93<H>  ----------------------------<  71  3.8   |  17<L>  |  2.56<H>    Ca    9.2      08 May 2019 07:30  Mg     2.8     05-08

## 2019-05-08 NOTE — PROGRESS NOTE ADULT - PROBLEM SELECTOR PLAN 2
chest pain is stable. on dual antiplatelet therapy. management per cardiology   per cardiology some point heart cath  : per cardiology : she is on diuretics!  : stable: cont current medications!  : has severe MR for anjali in AM  : ANJALI reviewed: has severe MR:  5/3: cts evalaution pendin/4: Cont currtn rx:  : cont rx:  : stabke/1  : has severe MR: defer to primary team for further options: no intervention from pulmonary side:

## 2019-05-08 NOTE — PROGRESS NOTE ADULT - ASSESSMENT
73YOF w/o h/o CAD, s/p CABG CHF adm to [facility] for Acute on Chronic systolic and diastolic HF

## 2019-05-08 NOTE — PROGRESS NOTE ADULT - SUBJECTIVE AND OBJECTIVE BOX
[facility]   MD RAHEEL SHAH MD ANGELA WONG, PA    TEL:  OFFICE: [phone]  DR SANTOYO CELL: [phone]  DR. NGUYEN CELL: [phone]  UMM KENDALL CELL: [phone]        Patient is a 73y old  Female who presents with a chief complaint of sob (08 May 2019 16:33)      Patient seen and examined at bedside. +sob but slightly better than yesterday    VITALS:  T(F): 97.4 (05-08-19 @ 17:48), Max: 97.9 (05-07-19 @ 21:09)  HR: 87 (05-08-19 @ 18:46)  BP: 106/53 (05-08-19 @ 18:46)  RR: 18 (05-08-19 @ 18:46)  SpO2: 100% (05-08-19 @ 17:48)  Wt(kg): --    05-07 @ 07:01  -  05-08 @ 07:00  --------------------------------------------------------  IN: 460 mL / OUT: 800 mL / NET: -340 mL    05-08 @ 07:01  -  05-08 @ 18:48  --------------------------------------------------------  IN: 360 mL / OUT: 575 mL / NET: -215 mL          PHYSICAL EXAM:  Constitutional: NAD  Neck: No JVD  Respiratory: slight basilar crackles  Cardiovascular: S1, S2, RRR  Gastrointestinal: BS+, soft, NT/ND  Extremities: No peripheral edema    Hospital Medications:   MEDICATIONS  (STANDING):  ALBUTerol/ipratropium for Nebulization 3 milliLiter(s) Nebulizer every 6 hours  aspirin enteric coated 81 milliGRAM(s) Oral daily  atorvastatin 40 milliGRAM(s) Oral at bedtime  buDESOnide 160 MICROgram(s)/formoterol 4.5 MICROgram(s) Inhaler 2 Puff(s) Inhalation two times a day  buMETAnide Injectable 2 milliGRAM(s) IV Push two times a day  chlorhexidine 4% Liquid 1 Application(s) Topical <User Schedule>  dextrose 5%. 1000 milliLiter(s) (50 mL/Hr) IV Continuous <Continuous>  dextrose 50% Injectable 12.5 Gram(s) IV Push once  dextrose 50% Injectable 25 Gram(s) IV Push once  dextrose 50% Injectable 25 Gram(s) IV Push once  docusate sodium 100 milliGRAM(s) Oral two times a day  ergocalciferol 59074 Unit(s) Oral <User Schedule>  gabapentin 100 milliGRAM(s) Oral two times a day  heparin  Injectable 5000 Unit(s) SubCutaneous every 8 hours  hydrALAZINE 25 milliGRAM(s) Oral three times a day  influenza   Vaccine 0.5 milliLiter(s) IntraMuscular once  insulin glargine Injectable (LANTUS) 65 Unit(s) SubCutaneous at bedtime  insulin lispro (HumaLOG) corrective regimen sliding scale   SubCutaneous three times a day before meals  insulin lispro (HumaLOG) corrective regimen sliding scale   SubCutaneous at bedtime  insulin lispro Injectable (HumaLOG) 30 Unit(s) SubCutaneous three times a day before meals  isosorbide   dinitrate Tablet (ISORDIL) 10 milliGRAM(s) Oral three times a day  levothyroxine 50 MICROGram(s) Oral daily  metoprolol succinate ER 12.5 milliGRAM(s) Oral daily  montelukast 10 milliGRAM(s) Oral daily  pantoprazole    Tablet 40 milliGRAM(s) Oral before breakfast  polyethylene glycol 3350 17 Gram(s) Oral two times a day  senna 2 Tablet(s) Oral at bedtime  sodium chloride 0.65% Nasal 1 Spray(s) Both Nostrils three times a day  ticagrelor 90 milliGRAM(s) Oral two times a day      LABS:  05-08    136  |  103  |  93<H>  ----------------------------<  71  3.8   |  17<L>  |  2.56<H>    Ca    9.2      08 May 2019 07:30  Mg     2.8     05-08      Creatinine Trend: 2.56 <--, 2.41 <--, 2.24 <--, 2.34 <--, 2.31 <--, 2.28 <--, 2.38 <--, 2.35 <--                                8.0    14.45 )-----------( 354      ( 08 May 2019 07:30 )             26.1     Urine Studies:  Urinalysis - [04-25-19 @ 09:30]      Color LIGHT YELLOW / Appearance CLEAR / SG 1.011 / pH 6.0      Gluc 70 / Ketone NEGATIVE  / Bili NEGATIVE / Urobili NORMAL       Blood NEGATIVE / Protein NEGATIVE / Leuk Est NEGATIVE / Nitrite NEGATIVE      RBC  / WBC  / Hyaline  / Gran  / Sq Epi  / Non Sq Epi  / Bacteria     Urine Creatinine 49.70      [05-02-19 @ 11:18]  Urine Sodium 46      [05-02-19 @ 11:18]  Urine Urea Nitrogen 770.0      [05-02-19 @ 11:18]  Urine Osmolality 422      [05-02-19 @ 11:18]    .4 (Ca --)      [04-25-19 @ 05:45]   --  PTH 43.55 (Ca --)      [09-10-18 @ 07:15]   --  PTH 36.60 (Ca --)      [09-08-18 @ 03:15]   --  Vitamin D (25OH) 25.9      [05-01-19 @ 04:00]  HbA1c 7.3      [04-25-19 @ 05:45]  TSH 3.44      [05-07-19 @ 07:00]  Lipid: chol 127, , HDL 22, LDL 67      [04-24-19 @ 12:21]    HCV 0.06, Nonreactive Hepatitis C AB  S/CO Ratio                        Interpretation  < 1.00                                   Non-Reactive  1.00 - 4.99                         Weakly-Reactive  > 5.00                                Reactive  Non-Reactive: A person with a non-reactive HCV antibody  result is considered uninfected.  No further action is  needed unless recent infection is suspected.  In these  cases, consider repeat testing later to detect  seroconversion..  Weakly-Reactive: HCV antibody test is abnormal, HCV RNA  Qualitative test will follow.  Reactive: HCV antibody test is abnormal, HCV RNA  Qualitative test will follow.  Note: HCV antibody testing is performed on the [employer] system.      [04-25-19 @ 05:45]      RADIOLOGY & ADDITIONAL STUDIES:

## 2019-05-08 NOTE — PROGRESS NOTE ADULT - PROBLEM SELECTOR PLAN 1
Will DC NPH insulin for now.  Will continue current insulin regimen for now. Will continue monitoring FS, log, will Follow up.  Patient counseled for compliance with consistent low carb diet.

## 2019-05-08 NOTE — PROGRESS NOTE ADULT - SUBJECTIVE AND OBJECTIVE BOX
Chief complaint  Patient is a 73y old  Female who presents with a chief complaint of sob (08 May 2019 12:52)   Review of systems  Patient in bed, looks comfortable, no fever, no hypoglycemia.    Labs and Fingersticks  CAPILLARY BLOOD GLUCOSE      POCT Blood Glucose.: 170 mg/dL (08 May 2019 13:15)  POCT Blood Glucose.: 70 mg/dL (08 May 2019 12:53)  POCT Blood Glucose.: 70 mg/dL (08 May 2019 12:34)  POCT Blood Glucose.: 74 mg/dL (08 May 2019 08:41)  POCT Blood Glucose.: 178 mg/dL (07 May 2019 21:39)  POCT Blood Glucose.: 245 mg/dL (07 May 2019 17:43)      Anion Gap, Serum: 16 <H> (05-08 @ 07:30)  Anion Gap, Serum: 17 <H> (05-07 @ 07:00)      Calcium, Total Serum: 9.2 (05-08 @ 07:30)  Calcium, Total Serum: 9.2 (05-07 @ 07:00)          05-08    136  |  103  |  93<H>  ----------------------------<  71  3.8   |  17<L>  |  2.56<H>    Ca    9.2      08 May 2019 07:30  Mg     2.8     05-08                          8.0    14.45 )-----------( 354      ( 08 May 2019 07:30 )             26.1     Medications  MEDICATIONS  (STANDING):  ALBUTerol/ipratropium for Nebulization 3 milliLiter(s) Nebulizer every 6 hours  aspirin enteric coated 81 milliGRAM(s) Oral daily  atorvastatin 40 milliGRAM(s) Oral at bedtime  buDESOnide 160 MICROgram(s)/formoterol 4.5 MICROgram(s) Inhaler 2 Puff(s) Inhalation two times a day  buMETAnide Injectable 2 milliGRAM(s) IV Push two times a day  chlorhexidine 4% Liquid 1 Application(s) Topical <User Schedule>  dextrose 5%. 1000 milliLiter(s) (50 mL/Hr) IV Continuous <Continuous>  dextrose 50% Injectable 12.5 Gram(s) IV Push once  dextrose 50% Injectable 25 Gram(s) IV Push once  dextrose 50% Injectable 25 Gram(s) IV Push once  docusate sodium 100 milliGRAM(s) Oral two times a day  ergocalciferol 89360 Unit(s) Oral <User Schedule>  gabapentin 100 milliGRAM(s) Oral two times a day  heparin  Injectable 5000 Unit(s) SubCutaneous every 8 hours  hydrALAZINE 25 milliGRAM(s) Oral three times a day  influenza   Vaccine 0.5 milliLiter(s) IntraMuscular once  insulin glargine Injectable (LANTUS) 65 Unit(s) SubCutaneous at bedtime  insulin lispro (HumaLOG) corrective regimen sliding scale   SubCutaneous three times a day before meals  insulin lispro (HumaLOG) corrective regimen sliding scale   SubCutaneous at bedtime  insulin lispro Injectable (HumaLOG) 30 Unit(s) SubCutaneous three times a day before meals  isosorbide   dinitrate Tablet (ISORDIL) 10 milliGRAM(s) Oral three times a day  levothyroxine 50 MICROGram(s) Oral daily  metoprolol succinate ER 12.5 milliGRAM(s) Oral daily  montelukast 10 milliGRAM(s) Oral daily  pantoprazole    Tablet 40 milliGRAM(s) Oral before breakfast  polyethylene glycol 3350 17 Gram(s) Oral two times a day  senna 2 Tablet(s) Oral at bedtime  sodium chloride 0.65% Nasal 1 Spray(s) Both Nostrils three times a day  ticagrelor 90 milliGRAM(s) Oral two times a day      Physical Exam  General: Patient comfortable in bed  Vital Signs Last 12 Hrs  T(F): 97.3 (05-08-19 @ 13:38), Max: 97.8 (05-08-19 @ 06:07)  HR: 75 (05-08-19 @ 15:48) (72 - 84)  BP: 109/54 (05-08-19 @ 13:38) (96/50 - 109/54)  BP(mean): --  RR: 20 (05-08-19 @ 13:38) (16 - 20)  SpO2: 98% (05-08-19 @ 15:48) (98% - 100%)  Neck: No palpable thyroid nodules.  CVS: S1S2, No murmurs  Respiratory: No wheezing, no crepitations  GI: Abdomen soft, bowel sounds positive  Musculoskeletal:  edema lower extremities.   Skin: No skin rashes, no ecchymosis    Diagnostics    Thyroid Stimulating Hormone, Serum: AM Sched. Collection: 07-May-2019 06:00 (05-06 @ 10:38)  Free Thyroxine, Serum: AM Sched. Collection: 07-May-2019 06:00 (05-06 @ 10:38)

## 2019-05-08 NOTE — PROGRESS NOTE ADULT - SUBJECTIVE AND OBJECTIVE BOX
Patient is a 73y old  Female who presents with a chief complaint of sob (08 May 2019 10:47)      Any change in ROS: Sitting in chair: on diuresis: not apparently SOB : DIL at bedside:     MEDICATIONS  (STANDING):  ALBUTerol/ipratropium for Nebulization 3 milliLiter(s) Nebulizer every 6 hours  aspirin enteric coated 81 milliGRAM(s) Oral daily  atorvastatin 40 milliGRAM(s) Oral at bedtime  buDESOnide 160 MICROgram(s)/formoterol 4.5 MICROgram(s) Inhaler 2 Puff(s) Inhalation two times a day  buMETAnide Injectable 2 milliGRAM(s) IV Push two times a day  chlorhexidine 4% Liquid 1 Application(s) Topical <User Schedule>  dextrose 5%. 1000 milliLiter(s) (50 mL/Hr) IV Continuous <Continuous>  dextrose 50% Injectable 12.5 Gram(s) IV Push once  dextrose 50% Injectable 25 Gram(s) IV Push once  dextrose 50% Injectable 25 Gram(s) IV Push once  docusate sodium 100 milliGRAM(s) Oral two times a day  ergocalciferol 46755 Unit(s) Oral <User Schedule>  gabapentin 100 milliGRAM(s) Oral two times a day  heparin  Injectable 5000 Unit(s) SubCutaneous every 8 hours  hydrALAZINE 25 milliGRAM(s) Oral three times a day  influenza   Vaccine 0.5 milliLiter(s) IntraMuscular once  insulin glargine Injectable (LANTUS) 65 Unit(s) SubCutaneous at bedtime  insulin lispro (HumaLOG) corrective regimen sliding scale   SubCutaneous three times a day before meals  insulin lispro (HumaLOG) corrective regimen sliding scale   SubCutaneous at bedtime  insulin lispro Injectable (HumaLOG) 30 Unit(s) SubCutaneous three times a day before meals  isosorbide   dinitrate Tablet (ISORDIL) 10 milliGRAM(s) Oral three times a day  levothyroxine 50 MICROGram(s) Oral daily  metoprolol succinate ER 12.5 milliGRAM(s) Oral daily  montelukast 10 milliGRAM(s) Oral daily  pantoprazole    Tablet 40 milliGRAM(s) Oral before breakfast  polyethylene glycol 3350 17 Gram(s) Oral two times a day  senna 2 Tablet(s) Oral at bedtime  sodium chloride 0.65% Nasal 1 Spray(s) Both Nostrils three times a day  ticagrelor 90 milliGRAM(s) Oral two times a day    MEDICATIONS  (PRN):  acetaminophen   Tablet .. 650 milliGRAM(s) Oral every 6 hours PRN Mild Pain (1 - 3), Moderate Pain (4 - 6), Severe Pain (7 - 10)  ALBUTerol/ipratropium for Nebulization. 3 milliLiter(s) Nebulizer once PRN Shortness of Breath  dextrose 40% Gel 15 Gram(s) Oral once PRN Blood Glucose LESS THAN 70 milliGRAM(s)/deciliter  diphenhydrAMINE 25 milliGRAM(s) Oral every 4 hours PRN Rash and/or Itching  glucagon  Injectable 1 milliGRAM(s) IntraMuscular once PRN Glucose LESS THAN 70 milligrams/deciliter  ondansetron Injectable 4 milliGRAM(s) IV Push once PRN Nausea and/or Vomiting    Vital Signs Last 24 Hrs  T(C): 36.4 (08 May 2019 08:02), Max: 36.6 (07 May 2019 21:09)  T(F): 97.5 (08 May 2019 08:02), Max: 97.9 (07 May 2019 21:09)  HR: 72 (08 May 2019 09:37) (68 - 84)  BP: 98/52 (08 May 2019 08:54) (92/47 - 104/51)  BP(mean): --  RR: 16 (08 May 2019 08:54) (16 - 20)  SpO2: 99% (08 May 2019 09:37) (98% - 100%)    I&O's Summary    07 May 2019 07:01  -  08 May 2019 07:00  --------------------------------------------------------  IN: 460 mL / OUT: 800 mL / NET: -340 mL    08 May 2019 07:01  -  08 May 2019 12:52  --------------------------------------------------------  IN: 240 mL / OUT: 200 mL / NET: 40 mL          Physical Exam:   GENERAL: NAD, well-groomed, well-developed  HEENT: MOHSEN/   Atraumatic, Normocephalic  ENMT: No tonsillar erythema, exudates, or enlargement; Moist mucous membranes, Good dentition, No lesions  NECK: Supple, No JVD, Normal thyroid  CHEST/LUNG: Scattered crackles no wheezing-  GI: : Soft, Nontender, Nondistended; Bowel sounds present  NERVOUS SYSTEM:  Alert & Oriented X3  EXTREMITIES:  2+ Peripheral Pulses, No clubbing, cyanosis, or edema  LYMPH: No lymphadenopathy noted  SKIN: No rashes or lesions  ENDOCRINOLOGY: No Thyromegaly  PSYCH: Appropriate    Labs:    CARDIAC MARKERS ( 06 May 2019 13:33 )  x     / x     / 54 u/L / 4.58 ng/mL / x                                8.0    14.45 )-----------( 354      ( 08 May 2019 07:30 )             26.1                         8.3    15.34 )-----------( 379      ( 07 May 2019 07:00 )             27.3                         8.4    13.21 )-----------( 408      ( 06 May 2019 07:20 )             27.2                         8.8    14.86 )-----------( 440      ( 05 May 2019 06:30 )             28.8     05-08    136  |  103  |  93<H>  ----------------------------<  71  3.8   |  17<L>  |  2.56<H>  05-07    141  |  107  |  82<H>  ----------------------------<  93  4.1   |  17<L>  |  2.41<H>  05-06    137  |  104  |  81<H>  ----------------------------<  239<H>  4.4   |  18<L>  |  2.24<H>  05-05    137  |  103  |  83<H>  ----------------------------<  167<H>  4.0   |  18<L>  |  2.34<H>    Ca    9.2      08 May 2019 07:30  Ca    9.2      07 May 2019 07:00  Mg     2.8     05-08  Mg     2.7     05-07      CAPILLARY BLOOD GLUCOSE      POCT Blood Glucose.: 70 mg/dL (08 May 2019 12:34)  POCT Blood Glucose.: 74 mg/dL (08 May 2019 08:41)  POCT Blood Glucose.: 178 mg/dL (07 May 2019 21:39)  POCT Blood Glucose.: 245 mg/dL (07 May 2019 17:43)            < from: Xray Chest 1 View- PORTABLE-Urgent (05.02.19 @ 10:47) >    EXAM:  XR CHEST PORTABLE URGENT 1V        PROCEDURE DATE:  May  2 2019         INTERPRETATION:    Portable chest xray: Shortness of Breath    Comparison: Most recent prior chest radiograph from 4/25/2019.    FINDINGS:    Lines/Tubes: None.    Heartand mediastinum:  Median sternotomy wires, mediastinal surgical   clips, and coronary stent.    Lungs, pleura, and airways: No focal pulmonary consolidation. No pleural   effusion or pneumothorax.    Bones and soft tissues: The bones and soft tissuesare unchanged.    Impression:    Clear lungs.              STACY HERNANDEZ M.D., RADIOLOGY RESIDENT  This document has been electronically signed.  EMIL BRUNO M.D., ATTENDING RADIOLOGIST  This document has been electronically signed. May  2 2019 12:05PM        < end of copied text >        RECENT CULTURES:        RESPIRATORY CULTURES:          Studies  Chest X-RAY  CT SCAN Chest   Venous Dopplers: LE:   CT Abdomen  Others

## 2019-05-09 DIAGNOSIS — I47.2 VENTRICULAR TACHYCARDIA: ICD-10-CM

## 2019-05-09 LAB
ANION GAP SERPL CALC-SCNC: 16 MMO/L — HIGH (ref 7–14)
ANION GAP SERPL CALC-SCNC: 16 MMO/L — HIGH (ref 7–14)
BUN SERPL-MCNC: 89 MG/DL — HIGH (ref 7–23)
BUN SERPL-MCNC: 90 MG/DL — HIGH (ref 7–23)
CALCIUM SERPL-MCNC: 8.3 MG/DL — LOW (ref 8.4–10.5)
CALCIUM SERPL-MCNC: 8.6 MG/DL — SIGNIFICANT CHANGE UP (ref 8.4–10.5)
CHLORIDE SERPL-SCNC: 100 MMOL/L — SIGNIFICANT CHANGE UP (ref 98–107)
CHLORIDE SERPL-SCNC: 103 MMOL/L — SIGNIFICANT CHANGE UP (ref 98–107)
CK MB BLD-MCNC: 5.42 NG/ML — HIGH (ref 1–4.7)
CK SERPL-CCNC: 67 U/L — SIGNIFICANT CHANGE UP (ref 25–170)
CK SERPL-CCNC: 97 U/L — SIGNIFICANT CHANGE UP (ref 25–170)
CO2 SERPL-SCNC: 18 MMOL/L — LOW (ref 22–31)
CO2 SERPL-SCNC: 18 MMOL/L — LOW (ref 22–31)
CREAT SERPL-MCNC: 2.36 MG/DL — HIGH (ref 0.5–1.3)
CREAT SERPL-MCNC: 2.4 MG/DL — HIGH (ref 0.5–1.3)
D DIMER BLD IA.RAPID-MCNC: 243 NG/ML — SIGNIFICANT CHANGE UP
GLUCOSE SERPL-MCNC: 145 MG/DL — HIGH (ref 70–99)
GLUCOSE SERPL-MCNC: 179 MG/DL — HIGH (ref 70–99)
HCT VFR BLD CALC: 23.2 % — LOW (ref 34.5–45)
HCT VFR BLD CALC: 23.3 % — LOW (ref 34.5–45)
HGB BLD-MCNC: 7.1 G/DL — LOW (ref 11.5–15.5)
HGB BLD-MCNC: 7.2 G/DL — LOW (ref 11.5–15.5)
MAGNESIUM SERPL-MCNC: 2.4 MG/DL — SIGNIFICANT CHANGE UP (ref 1.6–2.6)
MAGNESIUM SERPL-MCNC: 2.5 MG/DL — SIGNIFICANT CHANGE UP (ref 1.6–2.6)
MCHC RBC-ENTMCNC: 28.1 PG — SIGNIFICANT CHANGE UP (ref 27–34)
MCHC RBC-ENTMCNC: 28.9 PG — SIGNIFICANT CHANGE UP (ref 27–34)
MCHC RBC-ENTMCNC: 30.6 % — LOW (ref 32–36)
MCHC RBC-ENTMCNC: 30.9 % — LOW (ref 32–36)
MCV RBC AUTO: 91 FL — SIGNIFICANT CHANGE UP (ref 80–100)
MCV RBC AUTO: 94.3 FL — SIGNIFICANT CHANGE UP (ref 80–100)
NRBC # FLD: 0.02 K/UL — SIGNIFICANT CHANGE UP (ref 0–0)
NRBC # FLD: 0.02 K/UL — SIGNIFICANT CHANGE UP (ref 0–0)
PLATELET # BLD AUTO: 308 K/UL — SIGNIFICANT CHANGE UP (ref 150–400)
PLATELET # BLD AUTO: 309 K/UL — SIGNIFICANT CHANGE UP (ref 150–400)
PMV BLD: 9 FL — SIGNIFICANT CHANGE UP (ref 7–13)
PMV BLD: 9.1 FL — SIGNIFICANT CHANGE UP (ref 7–13)
POTASSIUM SERPL-MCNC: 4.2 MMOL/L — SIGNIFICANT CHANGE UP (ref 3.5–5.3)
POTASSIUM SERPL-MCNC: 4.4 MMOL/L — SIGNIFICANT CHANGE UP (ref 3.5–5.3)
POTASSIUM SERPL-SCNC: 4.2 MMOL/L — SIGNIFICANT CHANGE UP (ref 3.5–5.3)
POTASSIUM SERPL-SCNC: 4.4 MMOL/L — SIGNIFICANT CHANGE UP (ref 3.5–5.3)
RBC # BLD: 2.46 M/UL — LOW (ref 3.8–5.2)
RBC # BLD: 2.56 M/UL — LOW (ref 3.8–5.2)
RBC # FLD: 16.8 % — HIGH (ref 10.3–14.5)
RBC # FLD: 16.9 % — HIGH (ref 10.3–14.5)
SODIUM SERPL-SCNC: 134 MMOL/L — LOW (ref 135–145)
SODIUM SERPL-SCNC: 137 MMOL/L — SIGNIFICANT CHANGE UP (ref 135–145)
TROPONIN T, HIGH SENSITIVITY: 542 NG/L — CRITICAL HIGH (ref ?–14)
TROPONIN T, HIGH SENSITIVITY: 577 NG/L — CRITICAL HIGH (ref ?–14)
WBC # BLD: 13.67 K/UL — HIGH (ref 3.8–10.5)
WBC # BLD: 14.81 K/UL — HIGH (ref 3.8–10.5)
WBC # FLD AUTO: 13.67 K/UL — HIGH (ref 3.8–10.5)
WBC # FLD AUTO: 14.81 K/UL — HIGH (ref 3.8–10.5)

## 2019-05-09 PROCEDURE — 99233 SBSQ HOSP IP/OBS HIGH 50: CPT

## 2019-05-09 PROCEDURE — 76937 US GUIDE VASCULAR ACCESS: CPT | Mod: 26

## 2019-05-09 PROCEDURE — 93460 R&L HRT ART/VENTRICLE ANGIO: CPT | Mod: 26

## 2019-05-09 PROCEDURE — 93010 ELECTROCARDIOGRAM REPORT: CPT

## 2019-05-09 PROCEDURE — 71045 X-RAY EXAM CHEST 1 VIEW: CPT | Mod: 26

## 2019-05-09 RX ORDER — INSULIN LISPRO 100/ML
20 VIAL (ML) SUBCUTANEOUS
Refills: 0 | Status: DISCONTINUED | OUTPATIENT
Start: 2019-05-09 | End: 2019-05-11

## 2019-05-09 RX ORDER — BUMETANIDE 0.25 MG/ML
2 INJECTION INTRAMUSCULAR; INTRAVENOUS ONCE
Refills: 0 | Status: COMPLETED | OUTPATIENT
Start: 2019-05-09 | End: 2019-05-09

## 2019-05-09 RX ORDER — METOPROLOL TARTRATE 50 MG
12.5 TABLET ORAL DAILY
Refills: 0 | Status: DISCONTINUED | OUTPATIENT
Start: 2019-05-09 | End: 2019-05-09

## 2019-05-09 RX ORDER — BUMETANIDE 0.25 MG/ML
0.5 INJECTION INTRAMUSCULAR; INTRAVENOUS
Qty: 20 | Refills: 0 | Status: DISCONTINUED | OUTPATIENT
Start: 2019-05-09 | End: 2019-05-15

## 2019-05-09 RX ORDER — BUMETANIDE 0.25 MG/ML
2 INJECTION INTRAMUSCULAR; INTRAVENOUS EVERY 8 HOURS
Refills: 0 | Status: DISCONTINUED | OUTPATIENT
Start: 2019-05-09 | End: 2019-05-09

## 2019-05-09 RX ORDER — MORPHINE SULFATE 50 MG/1
1 CAPSULE, EXTENDED RELEASE ORAL ONCE
Refills: 0 | Status: DISCONTINUED | OUTPATIENT
Start: 2019-05-09 | End: 2019-05-09

## 2019-05-09 RX ADMIN — MORPHINE SULFATE 1 MILLIGRAM(S): 50 CAPSULE, EXTENDED RELEASE ORAL at 00:41

## 2019-05-09 RX ADMIN — Medication 1: at 13:32

## 2019-05-09 RX ADMIN — TICAGRELOR 90 MILLIGRAM(S): 90 TABLET ORAL at 06:29

## 2019-05-09 RX ADMIN — Medication 3 MILLILITER(S): at 04:40

## 2019-05-09 RX ADMIN — ATORVASTATIN CALCIUM 40 MILLIGRAM(S): 80 TABLET, FILM COATED ORAL at 22:06

## 2019-05-09 RX ADMIN — BUDESONIDE AND FORMOTEROL FUMARATE DIHYDRATE 2 PUFF(S): 160; 4.5 AEROSOL RESPIRATORY (INHALATION) at 08:01

## 2019-05-09 RX ADMIN — MORPHINE SULFATE 1 MILLIGRAM(S): 50 CAPSULE, EXTENDED RELEASE ORAL at 01:51

## 2019-05-09 RX ADMIN — Medication 3 MILLILITER(S): at 22:25

## 2019-05-09 RX ADMIN — Medication 25 MILLIGRAM(S): at 08:01

## 2019-05-09 RX ADMIN — Medication 30 UNIT(S): at 09:23

## 2019-05-09 RX ADMIN — MORPHINE SULFATE 1 MILLIGRAM(S): 50 CAPSULE, EXTENDED RELEASE ORAL at 02:10

## 2019-05-09 RX ADMIN — Medication 1 SPRAY(S): at 06:28

## 2019-05-09 RX ADMIN — MONTELUKAST 10 MILLIGRAM(S): 4 TABLET, CHEWABLE ORAL at 11:04

## 2019-05-09 RX ADMIN — BUMETANIDE 2 MILLIGRAM(S): 0.25 INJECTION INTRAMUSCULAR; INTRAVENOUS at 06:28

## 2019-05-09 RX ADMIN — BUMETANIDE 2 MILLIGRAM(S): 0.25 INJECTION INTRAMUSCULAR; INTRAVENOUS at 21:02

## 2019-05-09 RX ADMIN — MORPHINE SULFATE 1 MILLIGRAM(S): 50 CAPSULE, EXTENDED RELEASE ORAL at 01:00

## 2019-05-09 RX ADMIN — POLYETHYLENE GLYCOL 3350 17 GRAM(S): 17 POWDER, FOR SOLUTION ORAL at 06:30

## 2019-05-09 RX ADMIN — SENNA PLUS 2 TABLET(S): 8.6 TABLET ORAL at 22:06

## 2019-05-09 RX ADMIN — HEPARIN SODIUM 5000 UNIT(S): 5000 INJECTION INTRAVENOUS; SUBCUTANEOUS at 06:29

## 2019-05-09 RX ADMIN — Medication 1: at 09:22

## 2019-05-09 RX ADMIN — CHLORHEXIDINE GLUCONATE 1 APPLICATION(S): 213 SOLUTION TOPICAL at 11:05

## 2019-05-09 RX ADMIN — Medication 50 MICROGRAM(S): at 06:29

## 2019-05-09 RX ADMIN — Medication 1 SPRAY(S): at 22:06

## 2019-05-09 RX ADMIN — HEPARIN SODIUM 5000 UNIT(S): 5000 INJECTION INTRAVENOUS; SUBCUTANEOUS at 13:34

## 2019-05-09 RX ADMIN — Medication 100 MILLIGRAM(S): at 06:27

## 2019-05-09 RX ADMIN — ISOSORBIDE DINITRATE 10 MILLIGRAM(S): 5 TABLET ORAL at 02:25

## 2019-05-09 RX ADMIN — Medication 20 UNIT(S): at 13:32

## 2019-05-09 RX ADMIN — Medication 81 MILLIGRAM(S): at 11:04

## 2019-05-09 RX ADMIN — Medication 12.5 MILLIGRAM(S): at 06:29

## 2019-05-09 RX ADMIN — Medication 3 MILLILITER(S): at 09:51

## 2019-05-09 RX ADMIN — Medication 1 SPRAY(S): at 13:33

## 2019-05-09 RX ADMIN — GABAPENTIN 100 MILLIGRAM(S): 400 CAPSULE ORAL at 06:29

## 2019-05-09 RX ADMIN — BUMETANIDE 5 MG/HR: 0.25 INJECTION INTRAMUSCULAR; INTRAVENOUS at 22:43

## 2019-05-09 RX ADMIN — Medication 3 MILLILITER(S): at 16:28

## 2019-05-09 RX ADMIN — ISOSORBIDE DINITRATE 10 MILLIGRAM(S): 5 TABLET ORAL at 08:00

## 2019-05-09 RX ADMIN — PANTOPRAZOLE SODIUM 40 MILLIGRAM(S): 20 TABLET, DELAYED RELEASE ORAL at 06:29

## 2019-05-09 RX ADMIN — BUMETANIDE 2 MILLIGRAM(S): 0.25 INJECTION INTRAMUSCULAR; INTRAVENOUS at 11:00

## 2019-05-09 NOTE — PROGRESS NOTE ADULT - PROBLEM SELECTOR PLAN 3
-On March 8, Dr. Uriarte was consulted to evaluate for ICD  -Would consider EP consultation for the NSVT.   -would benefit overall from BB therapy when stable. -On March 8, Dr. Uriarte was consulted to evaluate for ICD  -Would consider EP consultation for the NSVT.   -Would benefit overall from BB therapy when stable.

## 2019-05-09 NOTE — PROGRESS NOTE ADULT - SUBJECTIVE AND OBJECTIVE BOX
call received from HF team. PLan to increase Bumex to 2mg IV tid, start Toprol XL 12.5 mg daily  Will repeat CK, troponin x 3 as per HF team request

## 2019-05-09 NOTE — CHART NOTE - NSCHARTNOTEFT_GEN_A_CORE
CCU Accept Note    SELVIN CLAIRE  73y  Patient is a 73y old  Female who presents with a chief complaint of sob (09 May 2019 14:37)      ====================  HPI & Hospital Course:   73F w/ HFrEF (2/2019 EF 20-25%, NYHA class IV), mod to severe MR, CAD s/p CABG s/p LIMA to LAD and PCI, HTN, DM, and hypothyroidism originally admitted for ADHF. Originally attempted diuresis on floor but had to be escalated to CCU for close observation and diuresis w/ bumex pushes from 4/26 - 5/2. Stabilized and transferred to the floor on 5/2 and continued to be diuresed. 5/1 LV w/ severe MR w/ tethering and severe pulm HTN. On 5/9 began having chest pain, NSVT, and new acute pulmonary edema seen on CXR on 5/9 and escalated back to CCU for close monitoring and aggressive diuresis. Course c/b likely PNA on admission s/p 7day abx w/ azithromax + levaquin. Course further c/b MERVIN on CKD. Per Renal notes, concern for likely cardiorenal related MERVIN.     Past Medical History:   PAST MEDICAL & SURGICAL HISTORY:  GERD (gastroesophageal reflux disease)  CAD (coronary artery disease): s/p CABG  Hypothyroidism  Hyperlipidemia  Diabetes mellitus, type 2  S/P CABG (coronary artery bypass graft)    Home Medications:  aspirin 81 mg oral delayed release tablet: 1 tab(s) orally once a day (02 Nov 2018 12:18)  atorvastatin 40 mg oral tablet: 1 tab(s) orally once a day (02 Nov 2018 12:18)  Brilinta (ticagrelor) 90 mg oral tablet: 1 tab(s) orally 2 times a day (02 Nov 2018 14:46)  furosemide 40 mg oral tablet: 1 tab(s) orally every 12 hours (08 Nov 2018 15:37)  insulin lispro: 30 unit(s) subcutaneous 3 times a day (before meals) (08 Nov 2018 15:37)  levothyroxine 50 mcg (0.05 mg) oral tablet: 1 tab(s) orally once a day (02 Nov 2018 12:18)  metoprolol tartrate 25 mg oral tablet: 1 tab(s) orally 2 times a day (02 Nov 2018 14:46)  montelukast 10 mg oral tablet: 1 tab(s) orally once a day (at bedtime) (02 Nov 2018 12:18)  raNITIdine 150 mg oral capsule: 1 cap(s) orally 2 times a day (02 Nov 2018 12:18)  Tresiba FlexTouch 100 units/mL subcutaneous solution: 65 unit(s) subcutaneous once a day (at bedtime) (08 Nov 2018 15:37)      Current Medications:   MEDICATIONS  (STANDING):  ALBUTerol/ipratropium for Nebulization 3 milliLiter(s) Nebulizer every 6 hours  aspirin enteric coated 81 milliGRAM(s) Oral daily  atorvastatin 40 milliGRAM(s) Oral at bedtime  buDESOnide 160 MICROgram(s)/formoterol 4.5 MICROgram(s) Inhaler 2 Puff(s) Inhalation two times a day  buMETAnide Injectable 2 milliGRAM(s) IV Push every 8 hours  chlorhexidine 4% Liquid 1 Application(s) Topical <User Schedule>  dextrose 5%. 1000 milliLiter(s) (50 mL/Hr) IV Continuous <Continuous>  dextrose 50% Injectable 12.5 Gram(s) IV Push once  dextrose 50% Injectable 25 Gram(s) IV Push once  dextrose 50% Injectable 25 Gram(s) IV Push once  docusate sodium 100 milliGRAM(s) Oral two times a day  ergocalciferol 69642 Unit(s) Oral <User Schedule>  gabapentin 100 milliGRAM(s) Oral two times a day  heparin  Injectable 5000 Unit(s) SubCutaneous every 8 hours  hydrALAZINE 25 milliGRAM(s) Oral three times a day  influenza   Vaccine 0.5 milliLiter(s) IntraMuscular once  insulin glargine Injectable (LANTUS) 65 Unit(s) SubCutaneous at bedtime  insulin lispro (HumaLOG) corrective regimen sliding scale   SubCutaneous three times a day before meals  insulin lispro (HumaLOG) corrective regimen sliding scale   SubCutaneous at bedtime  insulin lispro Injectable (HumaLOG) 20 Unit(s) SubCutaneous three times a day with meals  isosorbide   dinitrate Tablet (ISORDIL) 10 milliGRAM(s) Oral three times a day  levothyroxine 50 MICROGram(s) Oral daily  metoprolol succinate ER 12.5 milliGRAM(s) Oral daily  montelukast 10 milliGRAM(s) Oral daily  pantoprazole    Tablet 40 milliGRAM(s) Oral before breakfast  polyethylene glycol 3350 17 Gram(s) Oral two times a day  senna 2 Tablet(s) Oral at bedtime  sodium chloride 0.65% Nasal 1 Spray(s) Both Nostrils three times a day  ticagrelor 90 milliGRAM(s) Oral two times a day    MEDICATIONS  (PRN):  acetaminophen   Tablet .. 650 milliGRAM(s) Oral every 6 hours PRN Mild Pain (1 - 3), Moderate Pain (4 - 6), Severe Pain (7 - 10)  ALBUTerol/ipratropium for Nebulization. 3 milliLiter(s) Nebulizer once PRN Shortness of Breath  dextrose 40% Gel 15 Gram(s) Oral once PRN Blood Glucose LESS THAN 70 milliGRAM(s)/deciliter  diphenhydrAMINE 25 milliGRAM(s) Oral every 4 hours PRN Rash and/or Itching  glucagon  Injectable 1 milliGRAM(s) IntraMuscular once PRN Glucose LESS THAN 70 milligrams/deciliter  nitroglycerin     SubLingual 0.4 milliGRAM(s) SubLingual every 5 minutes PRN Chest Pain  ondansetron Injectable 4 milliGRAM(s) IV Push once PRN Nausea and/or Vomiting      Allergies:     Family History:     Social History:     ====================  Vital Signs Last 24 Hrs  T(C): 36.8 (09 May 2019 14:57), Max: 36.9 (09 May 2019 10:59)  T(F): 98.3 (09 May 2019 14:57), Max: 98.4 (09 May 2019 10:59)  HR: 86 (09 May 2019 16:29) (72 - 89)  BP: 107/51 (09 May 2019 16:29) (91/47 - 115/54)  BP(mean): --  RR: 22 (09 May 2019 14:57) (16 - 22)  SpO2: 100% (09 May 2019 14:57) (92% - 100%)      PHYSICAL EXAM:  *****      ====================  Labs & Imaging:   CBC Full  -  ( 09 May 2019 06:00 )  WBC Count : 13.67 K/uL  RBC Count : 2.46 M/uL  Hemoglobin : 7.1 g/dL  Hematocrit : 23.2 %  Platelet Count - Automated : 308 K/uL  Mean Cell Volume : 94.3 fL  Mean Cell Hemoglobin : 28.9 pg  Mean Cell Hemoglobin Concentration : 30.6 %  Auto Neutrophil # : x  Auto Lymphocyte # : x  Auto Monocyte # : x  Auto Eosinophil # : x  Auto Basophil # : x  Auto Neutrophil % : x  Auto Lymphocyte % : x  Auto Monocyte % : x  Auto Eosinophil % : x  Auto Basophil % : x    05-09    134<L>  |  100  |  89<H>  ----------------------------<  179<H>  4.2   |  18<L>  |  2.36<H>    Ca    8.6      09 May 2019 06:00  Mg     2.5     05-09          CARDIAC MARKERS ( 09 May 2019 14:00 )  x     / x     / 97 u/L / x     / x      CARDIAC MARKERS ( 08 May 2019 23:58 )  x     / x     / 67 u/L / 5.42 ng/mL / x                  ====================  Assessment & Plan:   73F w/ HFrEF (2/2019 EF 20-25%, NYHA class IV), mod to severe MR, CAD s/p CABG s/p LIMA to LAD and PCI, HTN, DM, and hypothyroidism admitted for ADHF c/b acute pulmonary edema and uptrending trops. Overall course c/b MERVIN (ongoing) + PNA (resolved, s/p full course abx).      ==================== CCU Accept Note    SELVIN CLAIRE  73y  Patient is a 73y old  Female who presents with a chief complaint of sob (09 May 2019 14:37)      ====================  HPI & Hospital Course:   73F w/ HFrEF (2/2019 EF 20-25%, NYHA class IV), mod to severe MR, CAD s/p CABG s/p LIMA to LAD and PCI, HTN, DM, and hypothyroidism originally admitted for ADHF. Originally attempted diuresis on floor but had to be escalated to CCU for close observation and diuresis w/ bumex pushes from 4/26 - 5/2. Stabilized and transferred to the floor on 5/2 and continued to be diuresed. 5/1 LV w/ severe MR w/ tethering and severe pulm HTN. On 5/9 began having chest pain, NSVT, and new acute pulmonary edema seen on CXR on 5/9 and escalated back to CCU for close monitoring and aggressive diuresis. Course c/b likely PNA on admission s/p 7day abx w/ azithromax + levaquin. Course further c/b MERVIN on CKD. Per Renal notes, concern for likely cardiorenal related MERVIN.     Past Medical History:   PAST MEDICAL & SURGICAL HISTORY:  GERD (gastroesophageal reflux disease)  CAD (coronary artery disease): s/p CABG  Hypothyroidism  Hyperlipidemia  Diabetes mellitus, type 2  S/P CABG (coronary artery bypass graft)    Home Medications:  aspirin 81 mg oral delayed release tablet: 1 tab(s) orally once a day (02 Nov 2018 12:18)  atorvastatin 40 mg oral tablet: 1 tab(s) orally once a day (02 Nov 2018 12:18)  Brilinta (ticagrelor) 90 mg oral tablet: 1 tab(s) orally 2 times a day (02 Nov 2018 14:46)  furosemide 40 mg oral tablet: 1 tab(s) orally every 12 hours (08 Nov 2018 15:37)  insulin lispro: 30 unit(s) subcutaneous 3 times a day (before meals) (08 Nov 2018 15:37)  levothyroxine 50 mcg (0.05 mg) oral tablet: 1 tab(s) orally once a day (02 Nov 2018 12:18)  metoprolol tartrate 25 mg oral tablet: 1 tab(s) orally 2 times a day (02 Nov 2018 14:46)  montelukast 10 mg oral tablet: 1 tab(s) orally once a day (at bedtime) (02 Nov 2018 12:18)  raNITIdine 150 mg oral capsule: 1 cap(s) orally 2 times a day (02 Nov 2018 12:18)  Tresiba FlexTouch 100 units/mL subcutaneous solution: 65 unit(s) subcutaneous once a day (at bedtime) (08 Nov 2018 15:37)      Current Medications:   MEDICATIONS  (STANDING):  ALBUTerol/ipratropium for Nebulization 3 milliLiter(s) Nebulizer every 6 hours  aspirin enteric coated 81 milliGRAM(s) Oral daily  atorvastatin 40 milliGRAM(s) Oral at bedtime  buDESOnide 160 MICROgram(s)/formoterol 4.5 MICROgram(s) Inhaler 2 Puff(s) Inhalation two times a day  buMETAnide Injectable 2 milliGRAM(s) IV Push every 8 hours  chlorhexidine 4% Liquid 1 Application(s) Topical <User Schedule>  dextrose 5%. 1000 milliLiter(s) (50 mL/Hr) IV Continuous <Continuous>  dextrose 50% Injectable 12.5 Gram(s) IV Push once  dextrose 50% Injectable 25 Gram(s) IV Push once  dextrose 50% Injectable 25 Gram(s) IV Push once  docusate sodium 100 milliGRAM(s) Oral two times a day  ergocalciferol 74596 Unit(s) Oral <User Schedule>  gabapentin 100 milliGRAM(s) Oral two times a day  heparin  Injectable 5000 Unit(s) SubCutaneous every 8 hours  hydrALAZINE 25 milliGRAM(s) Oral three times a day  influenza   Vaccine 0.5 milliLiter(s) IntraMuscular once  insulin glargine Injectable (LANTUS) 65 Unit(s) SubCutaneous at bedtime  insulin lispro (HumaLOG) corrective regimen sliding scale   SubCutaneous three times a day before meals  insulin lispro (HumaLOG) corrective regimen sliding scale   SubCutaneous at bedtime  insulin lispro Injectable (HumaLOG) 20 Unit(s) SubCutaneous three times a day with meals  isosorbide   dinitrate Tablet (ISORDIL) 10 milliGRAM(s) Oral three times a day  levothyroxine 50 MICROGram(s) Oral daily  metoprolol succinate ER 12.5 milliGRAM(s) Oral daily  montelukast 10 milliGRAM(s) Oral daily  pantoprazole    Tablet 40 milliGRAM(s) Oral before breakfast  polyethylene glycol 3350 17 Gram(s) Oral two times a day  senna 2 Tablet(s) Oral at bedtime  sodium chloride 0.65% Nasal 1 Spray(s) Both Nostrils three times a day  ticagrelor 90 milliGRAM(s) Oral two times a day    MEDICATIONS  (PRN):  acetaminophen   Tablet .. 650 milliGRAM(s) Oral every 6 hours PRN Mild Pain (1 - 3), Moderate Pain (4 - 6), Severe Pain (7 - 10)  ALBUTerol/ipratropium for Nebulization. 3 milliLiter(s) Nebulizer once PRN Shortness of Breath  dextrose 40% Gel 15 Gram(s) Oral once PRN Blood Glucose LESS THAN 70 milliGRAM(s)/deciliter  diphenhydrAMINE 25 milliGRAM(s) Oral every 4 hours PRN Rash and/or Itching  glucagon  Injectable 1 milliGRAM(s) IntraMuscular once PRN Glucose LESS THAN 70 milligrams/deciliter  nitroglycerin     SubLingual 0.4 milliGRAM(s) SubLingual every 5 minutes PRN Chest Pain  ondansetron Injectable 4 milliGRAM(s) IV Push once PRN Nausea and/or Vomiting      ====================  Vital Signs Last 24 Hrs  T(C): 36.8 (09 May 2019 14:57), Max: 36.9 (09 May 2019 10:59)  T(F): 98.3 (09 May 2019 14:57), Max: 98.4 (09 May 2019 10:59)  HR: 86 (09 May 2019 16:29) (72 - 89)  BP: 107/51 (09 May 2019 16:29) (91/47 - 115/54)  BP(mean): --  RR: 22 (09 May 2019 14:57) (16 - 22)  SpO2: 100% (09 May 2019 14:57) (92% - 100%)      PHYSICAL EXAM:  *****  PHYSICAL EXAM:    Appearance: NAD, no distress  HEENT:   Normal oral mucosa, PERRL, EOMI  Cardiovascular: Regular rate and rhythm, Normal S1 S2, No JVD, No murmurs, No edema  Respiratory: Lungs clear to auscultation. No rales, No rhonchi, No wheezing. No tenderness to palpation  Gastrointestinal:  Soft, Non-tender, + BS  Neurologic: Non-focal, A&Ox3  Skin: Warm and dry, No rashes, No ecchymoses, No cyanosis  Extremities: No clubbing, cyanosis or edema  Vascular: Peripheral pulses palpable 2+ bilaterally  Psychiatry: Mood & affect appropriate    ====================  Labs & Imaging:   CBC Full  -  ( 09 May 2019 06:00 )  WBC Count : 13.67 K/uL  RBC Count : 2.46 M/uL  Hemoglobin : 7.1 g/dL  Hematocrit : 23.2 %  Platelet Count - Automated : 308 K/uL  Mean Cell Volume : 94.3 fL  Mean Cell Hemoglobin : 28.9 pg  Mean Cell Hemoglobin Concentration : 30.6 %  Auto Neutrophil # : x  Auto Lymphocyte # : x  Auto Monocyte # : x  Auto Eosinophil # : x  Auto Basophil # : x  Auto Neutrophil % : x  Auto Lymphocyte % : x  Auto Monocyte % : x  Auto Eosinophil % : x  Auto Basophil % : x    05-09    134<L>  |  100  |  89<H>  ----------------------------<  179<H>  4.2   |  18<L>  |  2.36<H>    Ca    8.6      09 May 2019 06:00  Mg     2.5     05-09          CARDIAC MARKERS ( 09 May 2019 14:00 )  x     / x     / 97 u/L / x     / x      CARDIAC MARKERS ( 08 May 2019 23:58 )  x     / x     / 67 u/L / 5.42 ng/mL / x          ====================  Assessment & Plan:   73F w/ HFrEF (2/2019 EF 20-25%, NYHA class IV), mod to severe MR, CAD s/p CABG s/p LIMA to LAD and PCI, HTN, DM, and hypothyroidism admitted for ADHF c/b acute pulmonary edema and uptrending trops. Overall course c/b MERVIN (ongoing) + PNA (resolved, s/p full course abx).    PLAN    Admit to CCU.   Continuous cardiac monitoring to r/o arrhythmias.   Diuresis: Bumex 2 mg IVP x 1 now and start Bumex gtt at 1 mg/hr. Will up titrate as needed   No need for inotropic support at present  Trend BMP 2/2 diuresis and replete as needed  Daily weight monitoring  Strict I/O  Low sodium DASH diet  Continue current medications and treatment.   post cardiac cath care and monitoring                                    ====================

## 2019-05-09 NOTE — PROGRESS NOTE ADULT - ATTENDING COMMENTS
SEEMS TO BE DOING OK : FOR CTS EVALUATION!  5/9: sob secondary to severe MR and chf exacerbation: for surgical evaluation:

## 2019-05-09 NOTE — PROGRESS NOTE ADULT - SUBJECTIVE AND OBJECTIVE BOX
[facility]   MD RAHEEL SHAH MD ANGELA WONG, PA    TEL:  OFFICE: [phone]  DR SANTOYO CELL: [phone]  DR. NGUYEN CELL: [phone]  UMM KENDALL CELL: [phone]        Patient is a 73y old  Female who presents with a chief complaint of sob (09 May 2019 13:28)      Patient seen and examined at bedside. + chest pain/sob    VITALS:  T(F): 98.4 (05-09-19 @ 10:59), Max: 98.4 (05-09-19 @ 10:59)  HR: 80 (05-09-19 @ 10:59)  BP: 99/46 (05-09-19 @ 10:59)  RR: 22 (05-09-19 @ 10:59)  SpO2: 99% (05-09-19 @ 10:59)  Wt(kg): --    05-08 @ 07:01  -  05-09 @ 07:00  --------------------------------------------------------  IN: 360 mL / OUT: 575 mL / NET: -215 mL    05-09 @ 07:01  -  05-09 @ 14:14  --------------------------------------------------------  IN: 0 mL / OUT: 100 mL / NET: -100 mL          PHYSICAL EXAM:  Constitutional: NAD  Neck: No JVD  Respiratory: CTAB, no wheezes, rales or rhonchi  Cardiovascular: S1, S2, RRR  Gastrointestinal: BS+, soft, NT/ND  Extremities: No peripheral edema    Hospital Medications:   MEDICATIONS  (STANDING):  ALBUTerol/ipratropium for Nebulization 3 milliLiter(s) Nebulizer every 6 hours  aspirin enteric coated 81 milliGRAM(s) Oral daily  atorvastatin 40 milliGRAM(s) Oral at bedtime  buDESOnide 160 MICROgram(s)/formoterol 4.5 MICROgram(s) Inhaler 2 Puff(s) Inhalation two times a day  buMETAnide Injectable 2 milliGRAM(s) IV Push every 8 hours  chlorhexidine 4% Liquid 1 Application(s) Topical <User Schedule>  dextrose 5%. 1000 milliLiter(s) (50 mL/Hr) IV Continuous <Continuous>  dextrose 50% Injectable 12.5 Gram(s) IV Push once  dextrose 50% Injectable 25 Gram(s) IV Push once  dextrose 50% Injectable 25 Gram(s) IV Push once  docusate sodium 100 milliGRAM(s) Oral two times a day  ergocalciferol 87092 Unit(s) Oral <User Schedule>  gabapentin 100 milliGRAM(s) Oral two times a day  heparin  Injectable 5000 Unit(s) SubCutaneous every 8 hours  hydrALAZINE 25 milliGRAM(s) Oral three times a day  influenza   Vaccine 0.5 milliLiter(s) IntraMuscular once  insulin glargine Injectable (LANTUS) 65 Unit(s) SubCutaneous at bedtime  insulin lispro (HumaLOG) corrective regimen sliding scale   SubCutaneous three times a day before meals  insulin lispro (HumaLOG) corrective regimen sliding scale   SubCutaneous at bedtime  insulin lispro Injectable (HumaLOG) 20 Unit(s) SubCutaneous three times a day with meals  isosorbide   dinitrate Tablet (ISORDIL) 10 milliGRAM(s) Oral three times a day  levothyroxine 50 MICROGram(s) Oral daily  metoprolol succinate ER 12.5 milliGRAM(s) Oral daily  montelukast 10 milliGRAM(s) Oral daily  pantoprazole    Tablet 40 milliGRAM(s) Oral before breakfast  polyethylene glycol 3350 17 Gram(s) Oral two times a day  senna 2 Tablet(s) Oral at bedtime  sodium chloride 0.65% Nasal 1 Spray(s) Both Nostrils three times a day  ticagrelor 90 milliGRAM(s) Oral two times a day      LABS:  05-09    134<L>  |  100  |  89<H>  ----------------------------<  179<H>  4.2   |  18<L>  |  2.36<H>    Ca    8.6      09 May 2019 06:00  Mg     2.5     05-09      Creatinine Trend: 2.36 <--, 2.40 <--, 2.56 <--, 2.41 <--, 2.24 <--, 2.34 <--, 2.31 <--, 2.28 <--, 2.38 <--                                7.1    13.67 )-----------( 308      ( 09 May 2019 06:00 )             23.2     Urine Studies:  Urinalysis - [04-25-19 @ 09:30]      Color LIGHT YELLOW / Appearance CLEAR / SG 1.011 / pH 6.0      Gluc 70 / Ketone NEGATIVE  / Bili NEGATIVE / Urobili NORMAL       Blood NEGATIVE / Protein NEGATIVE / Leuk Est NEGATIVE / Nitrite NEGATIVE      RBC  / WBC  / Hyaline  / Gran  / Sq Epi  / Non Sq Epi  / Bacteria       .4 (Ca --)      [04-25-19 @ 05:45]   --  PTH 43.55 (Ca --)      [09-10-18 @ 07:15]   --  PTH 36.60 (Ca --)      [09-08-18 @ 03:15]   --  Vitamin D (25OH) 25.9      [05-01-19 @ 04:00]  HbA1c 7.3      [04-25-19 @ 05:45]  TSH 3.44      [05-07-19 @ 07:00]  Lipid: chol 127, , HDL 22, LDL 67      [04-24-19 @ 12:21]    HCV 0.06, Nonreactive Hepatitis C AB  S/CO Ratio                        Interpretation  < 1.00                                   Non-Reactive  1.00 - 4.99                         Weakly-Reactive  > 5.00                                Reactive  Non-Reactive: A person with a non-reactive HCV antibody  result is considered uninfected.  No further action is  needed unless recent infection is suspected.  In these  cases, consider repeat testing later to detect  seroconversion..  Weakly-Reactive: HCV antibody test is abnormal, HCV RNA  Qualitative test will follow.  Reactive: HCV antibody test is abnormal, HCV RNA  Qualitative test will follow.  Note: HCV antibody testing is performed on the [employer] system.      [04-25-19 @ 05:45]      RADIOLOGY & ADDITIONAL STUDIES: [facility]   MD RAHEEL SHAH MD ANGELA WONG, PA    TEL:  OFFICE: [phone]  DR SANTOYO CELL: [phone]  DR. NGUYEN CELL: [phone]  UM[facility] KENDALL CELL: [phone]        Patient is a 73y old  Female who presents with a chief complaint of sob (09 May 2019 13:28)      Patient seen and examined at bedside. + chest pain/sob    VITALS:  T(F): 98.4 (05-09-19 @ 10:59), Max: 98.4 (05-09-19 @ 10:59)  HR: 80 (05-09-19 @ 10:59)  BP: 99/46 (05-09-19 @ 10:59)  RR: 22 (05-09-19 @ 10:59)  SpO2: 99% (05-09-19 @ 10:59)  Wt(kg): --    05-08 @ 07:01  -  05-09 @ 07:00  --------------------------------------------------------  IN: 360 mL / OUT: 575 mL / NET: -215 mL    05-09 @ 07:01  -  05-09 @ 14:14  --------------------------------------------------------  IN: 0 mL / OUT: 100 mL / NET: -100 mL          PHYSICAL EXAM:  Constitutional: appears to be in discomfort  Neck: No JVD  Respiratory: slight basilar crackles  Cardiovascular: S1, S2, RRR  Gastrointestinal: BS+, soft, NT/ND  Extremities: No peripheral edema    Hospital Medications:   MEDICATIONS  (STANDING):  ALBUTerol/ipratropium for Nebulization 3 milliLiter(s) Nebulizer every 6 hours  aspirin enteric coated 81 milliGRAM(s) Oral daily  atorvastatin 40 milliGRAM(s) Oral at bedtime  buDESOnide 160 MICROgram(s)/formoterol 4.5 MICROgram(s) Inhaler 2 Puff(s) Inhalation two times a day  buMETAnide Injectable 2 milliGRAM(s) IV Push every 8 hours  chlorhexidine 4% Liquid 1 Application(s) Topical <User Schedule>  dextrose 5%. 1000 milliLiter(s) (50 mL/Hr) IV Continuous <Continuous>  dextrose 50% Injectable 12.5 Gram(s) IV Push once  dextrose 50% Injectable 25 Gram(s) IV Push once  dextrose 50% Injectable 25 Gram(s) IV Push once  docusate sodium 100 milliGRAM(s) Oral two times a day  ergocalciferol 55960 Unit(s) Oral <User Schedule>  gabapentin 100 milliGRAM(s) Oral two times a day  heparin  Injectable 5000 Unit(s) SubCutaneous every 8 hours  hydrALAZINE 25 milliGRAM(s) Oral three times a day  influenza   Vaccine 0.5 milliLiter(s) IntraMuscular once  insulin glargine Injectable (LANTUS) 65 Unit(s) SubCutaneous at bedtime  insulin lispro (HumaLOG) corrective regimen sliding scale   SubCutaneous three times a day before meals  insulin lispro (HumaLOG) corrective regimen sliding scale   SubCutaneous at bedtime  insulin lispro Injectable (HumaLOG) 20 Unit(s) SubCutaneous three times a day with meals  isosorbide   dinitrate Tablet (ISORDIL) 10 milliGRAM(s) Oral three times a day  levothyroxine 50 MICROGram(s) Oral daily  metoprolol succinate ER 12.5 milliGRAM(s) Oral daily  montelukast 10 milliGRAM(s) Oral daily  pantoprazole    Tablet 40 milliGRAM(s) Oral before breakfast  polyethylene glycol 3350 17 Gram(s) Oral two times a day  senna 2 Tablet(s) Oral at bedtime  sodium chloride 0.65% Nasal 1 Spray(s) Both Nostrils three times a day  ticagrelor 90 milliGRAM(s) Oral two times a day      LABS:  05-09    134<L>  |  100  |  89<H>  ----------------------------<  179<H>  4.2   |  18<L>  |  2.36<H>    Ca    8.6      09 May 2019 06:00  Mg     2.5     05-09      Creatinine Trend: 2.36 <--, 2.40 <--, 2.56 <--, 2.41 <--, 2.24 <--, 2.34 <--, 2.31 <--, 2.28 <--, 2.38 <--                                7.1    13.67 )-----------( 308      ( 09 May 2019 06:00 )             23.2     Urine Studies:  Urinalysis - [04-25-19 @ 09:30]      Color LIGHT YELLOW / Appearance CLEAR / SG 1.011 / pH 6.0      Gluc 70 / Ketone NEGATIVE  / Bili NEGATIVE / Urobili NORMAL       Blood NEGATIVE / Protein NEGATIVE / Leuk Est NEGATIVE / Nitrite NEGATIVE      RBC  / WBC  / Hyaline  / Gran  / Sq Epi  / Non Sq Epi  / Bacteria       .4 (Ca --)      [04-25-19 @ 05:45]   --  PTH 43.55 (Ca --)      [09-10-18 @ 07:15]   --  PTH 36.60 (Ca --)      [09-08-18 @ 03:15]   --  Vitamin D (25OH) 25.9      [05-01-19 @ 04:00]  HbA1c 7.3      [04-25-19 @ 05:45]  TSH 3.44      [05-07-19 @ 07:00]  Lipid: chol 127, , HDL 22, LDL 67      [04-24-19 @ 12:21]    HCV 0.06, Nonreactive Hepatitis C AB  S/CO Ratio                        Interpretation  < 1.00                                   Non-Reactive  1.00 - 4.99                         Weakly-Reactive  > 5.00                                Reactive  Non-Reactive: A person with a non-reactive HCV antibody  result is considered uninfected.  No further action is  needed unless recent infection is suspected.  In these  cases, consider repeat testing later to detect  seroconversion..  Weakly-Reactive: HCV antibody test is abnormal, HCV RNA  Qualitative test will follow.  Reactive: HCV antibody test is abnormal, HCV RNA  Qualitative test will follow.  Note: HCV antibody testing is performed on the [employer] system.      [04-25-19 @ 05:45]      RADIOLOGY & ADDITIONAL STUDIES:

## 2019-05-09 NOTE — PROGRESS NOTE ADULT - PROBLEM SELECTOR PLAN 1
She is on diuretics: awaiting TRANSFER TO [facility]: RECED EXTRAS DOSES OF BUMEX TODAY

## 2019-05-09 NOTE — PROGRESS NOTE ADULT - ATTENDING COMMENTS
Developed pulm edema overnight with elevated troponin.  Recheck troponin, and trend troponin and CK-MB q8x3.  Restart Toprol.  Increase Bumex to 2 mg iv tid.  Will f/u with Dr. Bahena re: mitral clip.

## 2019-05-09 NOTE — CHART NOTE - NSCHARTNOTEFT_GEN_A_CORE
Called to evaluate patient with chest pain. Patient describes pain to be 10/10, chest pressure with no radiation, midsternal, worse than prior chest pain. EKG with no change than prior EKG. Patient s/p nitro Sl x 3 with no effect. Patient also received 500 ml IVF x 1 for BP in 90s after nitro SL. CE pending    Plan  Continue to monitor patient.   Pending cardiac enzymes  Pain not relieved with nitro  Low dose Morphine for pain control.   No more IVF.

## 2019-05-09 NOTE — PROGRESS NOTE ADULT - ASSESSMENT
Assessment  DMT2: 73y Female with DM T2 with hyperglycemia, was on insulin at home started on basal bolus insulin, blood sugars trending down, NPH was DC, FS improving she has decreased PO intake,  feeling weak.  CAD: on medications, stable, monitored.  Hypothyroidism: On Synthroid 50 mcg po daily, compliant with Synthroid intake, asymptomatic.  HTN: Controlled,  on antihypertensive medications.  SOB: On Tx, oxygen.  Obesity: No strict exercise routines, not on any weight loss program, neither on low calorie diet.          Jillian Banks MD  Cell: [phone]  Office: [phone]

## 2019-05-09 NOTE — PROGRESS NOTE ADULT - SUBJECTIVE AND OBJECTIVE BOX
Date of Admission:  4/24/19  24 hour events:  Short of breath and anxious overnight with several episodes of NSVT, the longest 8 beats. CXR done overnight revealing new interstitial pulmonary edema  Vital Signs Last 24 Hrs  T(C): 36.8 (09 May 2019 07:56), Max: 36.8 (09 May 2019 07:56)  T(F): 98.2 (09 May 2019 07:56), Max: 98.2 (09 May 2019 07:56)  HR: 74 (09 May 2019 09:51) (73 - 89)  BP: 114/48 (09 May 2019 07:56) (91/47 - 115/54)  BP(mean): --  RR: 18 (09 May 2019 07:56) (16 - 20)  SpO2: 92% (09 May 2019 07:56) (92% - 100%)  I&O's Summary    08 May 2019 07:01  -  09 May 2019 07:00  --------------------------------------------------------  IN: 360 mL / OUT: 575 mL / NET: -215 mL    09 May 2019 07:01  -  09 May 2019 10:19  --------------------------------------------------------  IN: 0 mL / OUT: 100 mL / NET: -100 mL        MEDICATIONS:  aspirin enteric coated 81 milliGRAM(s) Oral daily  buMETAnide Injectable 2 milliGRAM(s) IV Push two times a day  heparin  Injectable 5000 Unit(s) SubCutaneous every 8 hours  hydrALAZINE 25 milliGRAM(s) Oral three times a day  isosorbide   dinitrate Tablet (ISORDIL) 10 milliGRAM(s) Oral three times a day  metoprolol succinate ER 12.5 milliGRAM(s) Oral daily  nitroglycerin     SubLingual 0.4 milliGRAM(s) SubLingual every 5 minutes PRN  ticagrelor 90 milliGRAM(s) Oral two times a day  ALBUTerol/ipratropium for Nebulization 3 milliLiter(s) Nebulizer every 6 hours  ALBUTerol/ipratropium for Nebulization. 3 milliLiter(s) Nebulizer once PRN  buDESOnide 160 MICROgram(s)/formoterol 4.5 MICROgram(s) Inhaler 2 Puff(s) Inhalation two times a day  montelukast 10 milliGRAM(s) Oral daily  acetaminophen   Tablet .. 650 milliGRAM(s) Oral every 6 hours PRN  diphenhydrAMINE 25 milliGRAM(s) Oral every 4 hours PRN  gabapentin 100 milliGRAM(s) Oral two times a day  ondansetron Injectable 4 milliGRAM(s) IV Push once PRN  docusate sodium 100 milliGRAM(s) Oral two times a day  pantoprazole    Tablet 40 milliGRAM(s) Oral before breakfast  polyethylene glycol 3350 17 Gram(s) Oral two times a day  senna 2 Tablet(s) Oral at bedtime    atorvastatin 40 milliGRAM(s) Oral at bedtime  dextrose 40% Gel 15 Gram(s) Oral once PRN  dextrose 50% Injectable 12.5 Gram(s) IV Push once  dextrose 50% Injectable 25 Gram(s) IV Push once  dextrose 50% Injectable 25 Gram(s) IV Push once  glucagon  Injectable 1 milliGRAM(s) IntraMuscular once PRN  insulin glargine Injectable (LANTUS) 65 Unit(s) SubCutaneous at bedtime  insulin lispro (HumaLOG) corrective regimen sliding scale   SubCutaneous three times a day before meals  insulin lispro (HumaLOG) corrective regimen sliding scale   SubCutaneous at bedtime  insulin lispro Injectable (HumaLOG) 20 Unit(s) SubCutaneous three times a day with meals  levothyroxine 50 MICROGram(s) Oral daily    chlorhexidine 4% Liquid 1 Application(s) Topical <User Schedule>  dextrose 5%. 1000 milliLiter(s) IV Continuous <Continuous>  ergocalciferol 19432 Unit(s) Oral <User Schedule>  influenza   Vaccine 0.5 milliLiter(s) IntraMuscular once  sodium chloride 0.65% Nasal 1 Spray(s) Both Nostrils three times a day    REVIEW OF SYSTEMS:    CONSTITUTIONAL: endorses anxiety, No weakness, fevers or chills  EYES/ENT: No visual changes;  No vertigo or throat pain   NECK: No pain or stiffness  RESPIRATORY: endorses shortness of breath  CARDIOVASCULAR: No chest pain or palpitations  GASTROINTESTINAL: No abdominal or epigastric pain. No nausea, vomiting, or hematemesis; No diarrhea or constipation. No melena or hematochezia.  GENITOURINARY: No dysuria, frequency or hematuria  NEUROLOGICAL: No numbness or weakness  SKIN: No itching, rashes      PHYSICAL EXAM:  General: NAD  Cardiovascular: Normal S1 S2, No JVD, No murmurs, No edema  Respiratory: Lungs with crackles at the bases	  Gastrointestinal:  Soft, Non-tender, + BS	  Skin: warm and dry, No rashes, No ecchymoses, No cyanosis	  Extremities: Normal range of motion, No clubbing, cyanosis or edema  Vascular: Peripheral pulses palpable 2+ bilaterally    LABS:	 	    CBC Full  -  ( 09 May 2019 06:00 )  WBC Count : 13.67 K/uL  Hemoglobin : 7.1 g/dL  Hematocrit : 23.2 %  Platelet Count - Automated : 308 K/uL  Mean Cell Volume : 94.3 fL  Mean Cell Hemoglobin : 28.9 pg  Mean Cell Hemoglobin Concentration : 30.6 %  Auto Neutrophil # : x  Auto Lymphocyte # : x  Auto Monocyte # : x  Auto Eosinophil # : x  Auto Basophil # : x  Auto Neutrophil % : x  Auto Lymphocyte % : x  Auto Monocyte % : x  Auto Eosinophil % : x  Auto Basophil % : x    05-09    134<L>  |  100  |  89<H>  ----------------------------<  179<H>  4.2   |  18<L>  |  2.36<H>  05-08    137  |  103  |  90<H>  ----------------------------<  145<H>  4.4   |  18<L>  |  2.40<H>    Ca    8.6      09 May 2019 06:00  Ca    8.3<L>      08 May 2019 23:58  Mg     2.5     05-09  Mg     2.4     05-08        TELEMETRY: 	 NSR with NSVT   ECG:  	  RADIOLOGY:< from: Xray Chest 1 View-PORTABLE IMMEDIATE (05.09.19 @ 01:31) >   New interstitial pulmonary edema.      < end of copied text >    ECHO:< from: Transesophageal Echocardiogram w/o TTE (05.01.19 @ 18:25) >  CONCLUSIONS:  1. Mitral annular calcification and calcified mitral  leaflets which are tethered. The posterior mitral leaflet  is particularly tethered.  Severe mitral regurgitation  which originates along the coaptation line.  2. Calcified trileaflet aortic valve with normal opening.  3. Left atrial enlargement. Left atrial appendage could not  be visualized, unclear if it was ligated during prior CABG.    4. Left ventricular enlargement.  5. Severe  left ventricular systolic dysfunction.  6. Right ventricular enlargement with decreased right  ventricular systolic function.  7. Agitated saline injection demonstrates evidence of a  patent foramen ovale with left to right flow.  ADDENDUM 5/3/2019: Systolic flow reversal seen in the right  upper pulmonary vein. Technically very difficult study.    < end of copied text >

## 2019-05-09 NOTE — PROGRESS NOTE ADULT - PROBLEM SELECTOR PLAN 2
chest pain is stable. on dual antiplatelet therapy. management per cardiology   per cardiology some point heart cath  : per cardiology : she is on diuretics!  : stable: cont current medications!  : has severe MR for anjali in AM  : ANJALI reviewed: has severe MR:  5/3: cts evalaution pendin/4: Cont currtn rx:  : cont rx:  : stabke/1  : has severe MR: defer to primary team for further options: no intervention from pulmonary side:  : CONT diuretics: per cts now: for surgery

## 2019-05-09 NOTE — PROGRESS NOTE ADULT - ASSESSMENT
72 YO F w/o h/o CAD, s/p CABG Systolic CHF, CKD 3b, who p/w sob and pino.    - Acute on Chronic Systolic CHF Exacerbation, and Severe Mitral Valve Regurgitation:      - overnight and this AM, pt with chest pain at rest, a/w anxiety, temporarily relieved via nitro then morphine. tropinin's elevated. tele 5 beats wide complex tachycardia. bp low normal      - ccu and cardio eval stat.        - severe systolic dysfunction with severe MVR - pt/family agreeable to mitral valve clip      - c/w cardiac meds. adjust management per cards.        - PT      - cards and all consultants and medical team members management greatly appreciated       - tele     - Acute Gout Flare:       - improved    - NSTEMI:      - asa/brilinta/statin      - adjust management prn      - tele    - MERVIN on CKD      - stable renal function.  trend renal function.      - optimize comorbidities. Avoid nephrotoxic rx     - DM II with long term complications of hyperglycemia, hypoglcyemia, and nephropathy   - c/w dm rx, as above    - monitor FS closely given hypoglycemia       - complicated by poor nutritional compliance (pt fed food from outside the hospital)       - c/w dm rx      - dm education

## 2019-05-09 NOTE — PROGRESS NOTE ADULT - ASSESSMENT
73YOF w/o h/o CAD, s/p CABG CHF adm to [facility] for Acute on Chronic systolic and diastolic HF

## 2019-05-09 NOTE — PROGRESS NOTE ADULT - ASSESSMENT
73F with stage C HF, NYHA class IV with possible pulmonary edema secondary to ischemic MR. PMH significant for  HTN, DM, CAD s/p CABG (w/ prior PCI to [facility]; GOMES-LAD patent with Dr. Ansari in 2014), HFrEF (LVEF 25%, LVEDD 5.2 cm), likely mod-severe MR, severe pulm HTN

## 2019-05-09 NOTE — PROGRESS NOTE ADULT - SUBJECTIVE AND OBJECTIVE BOX
Patient is a 73y old  Female who presents with a chief complaint of sob (09 May 2019 11:15)      Any change in ROS: got more SOB today : reced bumex: awaiting transfer to [facility]    MEDICATIONS  (STANDING):  ALBUTerol/ipratropium for Nebulization 3 milliLiter(s) Nebulizer every 6 hours  aspirin enteric coated 81 milliGRAM(s) Oral daily  atorvastatin 40 milliGRAM(s) Oral at bedtime  buDESOnide 160 MICROgram(s)/formoterol 4.5 MICROgram(s) Inhaler 2 Puff(s) Inhalation two times a day  buMETAnide Injectable 2 milliGRAM(s) IV Push two times a day  chlorhexidine 4% Liquid 1 Application(s) Topical <User Schedule>  dextrose 5%. 1000 milliLiter(s) (50 mL/Hr) IV Continuous <Continuous>  dextrose 50% Injectable 12.5 Gram(s) IV Push once  dextrose 50% Injectable 25 Gram(s) IV Push once  dextrose 50% Injectable 25 Gram(s) IV Push once  docusate sodium 100 milliGRAM(s) Oral two times a day  ergocalciferol 47694 Unit(s) Oral <User Schedule>  gabapentin 100 milliGRAM(s) Oral two times a day  heparin  Injectable 5000 Unit(s) SubCutaneous every 8 hours  hydrALAZINE 25 milliGRAM(s) Oral three times a day  influenza   Vaccine 0.5 milliLiter(s) IntraMuscular once  insulin glargine Injectable (LANTUS) 65 Unit(s) SubCutaneous at bedtime  insulin lispro (HumaLOG) corrective regimen sliding scale   SubCutaneous three times a day before meals  insulin lispro (HumaLOG) corrective regimen sliding scale   SubCutaneous at bedtime  insulin lispro Injectable (HumaLOG) 20 Unit(s) SubCutaneous three times a day with meals  isosorbide   dinitrate Tablet (ISORDIL) 10 milliGRAM(s) Oral three times a day  levothyroxine 50 MICROGram(s) Oral daily  montelukast 10 milliGRAM(s) Oral daily  pantoprazole    Tablet 40 milliGRAM(s) Oral before breakfast  polyethylene glycol 3350 17 Gram(s) Oral two times a day  senna 2 Tablet(s) Oral at bedtime  sodium chloride 0.65% Nasal 1 Spray(s) Both Nostrils three times a day  ticagrelor 90 milliGRAM(s) Oral two times a day    MEDICATIONS  (PRN):  acetaminophen   Tablet .. 650 milliGRAM(s) Oral every 6 hours PRN Mild Pain (1 - 3), Moderate Pain (4 - 6), Severe Pain (7 - 10)  ALBUTerol/ipratropium for Nebulization. 3 milliLiter(s) Nebulizer once PRN Shortness of Breath  dextrose 40% Gel 15 Gram(s) Oral once PRN Blood Glucose LESS THAN 70 milliGRAM(s)/deciliter  diphenhydrAMINE 25 milliGRAM(s) Oral every 4 hours PRN Rash and/or Itching  glucagon  Injectable 1 milliGRAM(s) IntraMuscular once PRN Glucose LESS THAN 70 milligrams/deciliter  nitroglycerin     SubLingual 0.4 milliGRAM(s) SubLingual every 5 minutes PRN Chest Pain  ondansetron Injectable 4 milliGRAM(s) IV Push once PRN Nausea and/or Vomiting    Vital Signs Last 24 Hrs  T(C): 36.9 (09 May 2019 10:59), Max: 36.9 (09 May 2019 10:59)  T(F): 98.4 (09 May 2019 10:59), Max: 98.4 (09 May 2019 10:59)  HR: 80 (09 May 2019 10:59) (73 - 89)  BP: 99/46 (09 May 2019 10:59) (91/47 - 115/54)  BP(mean): --  RR: 22 (09 May 2019 10:59) (16 - 22)  SpO2: 99% (09 May 2019 10:59) (92% - 100%)    I&O's Summary    08 May 2019 07:01  -  09 May 2019 07:00  --------------------------------------------------------  IN: 360 mL / OUT: 575 mL / NET: -215 mL    09 May 2019 07:01  -  09 May 2019 12:22  --------------------------------------------------------  IN: 0 mL / OUT: 100 mL / NET: -100 mL          Physical Exam:   GENERAL: NAD, well-groomed, well-developed  HEENT: MOHSEN/   Atraumatic, Normocephalic  ENMT: No tonsillar erythema, exudates, or enlargement; Moist mucous membranes, Good dentition, No lesions  NECK: Supple, No JVD, Normal thyroid  CHEST/LUNG: Scattered crackles: no wheezing:   CVS: Regular rate and rhythm; No murmurs, rubs, or gallops  GI: : Soft, Nontender, Nondistended; Bowel sounds present  NERVOUS SYSTEM:  Alert & Oriented X3  EXTREMITIES: - edema  LYMPH: No lymphadenopathy noted  SKIN: No rashes or lesions  ENDOCRINOLOGY: No Thyromegaly  PSYCH: Appropriate    Labs:    CARDIAC MARKERS ( 08 May 2019 23:58 )  x     / x     / 67 u/L / 5.42 ng/mL / x                                7.1    13.67 )-----------( 308      ( 09 May 2019 06:00 )             23.2                         7.2    14.81 )-----------( 309      ( 08 May 2019 23:58 )             23.3                         8.0    14.45 )-----------( 354      ( 08 May 2019 07:30 )             26.1                         8.3    15.34 )-----------( 379      ( 07 May 2019 07:00 )             27.3                         8.4    13.21 )-----------( 408      ( 06 May 2019 07:20 )             27.2     05-09    134<L>  |  100  |  89<H>  ----------------------------<  179<H>  4.2   |  18<L>  |  2.36<H>  05-08    137  |  103  |  90<H>  ----------------------------<  145<H>  4.4   |  18<L>  |  2.40<H>  05-08    136  |  103  |  93<H>  ----------------------------<  71  3.8   |  17<L>  |  2.56<H>  05-07    141  |  107  |  82<H>  ----------------------------<  93  4.1   |  17<L>  |  2.41<H>  05-06    137  |  104  |  81<H>  ----------------------------<  239<H>  4.4   |  18<L>  |  2.24<H>    Ca    8.6      09 May 2019 06:00  Ca    8.3<L>      08 May 2019 23:58  Ca    9.2      08 May 2019 07:30  Mg     2.5     05-09  Mg     2.4     05-08  Mg     2.8     05-08      CAPILLARY BLOOD GLUCOSE      POCT Blood Glucose.: 169 mg/dL (09 May 2019 08:58)  POCT Blood Glucose.: 159 mg/dL (08 May 2019 22:34)  POCT Blood Glucose.: 101 mg/dL (08 May 2019 18:30)  POCT Blood Glucose.: 84 mg/dL (08 May 2019 18:11)  POCT Blood Glucose.: 61 mg/dL (08 May 2019 17:52)  POCT Blood Glucose.: 170 mg/dL (08 May 2019 13:15)  POCT Blood Glucose.: 70 mg/dL (08 May 2019 12:53)  POCT Blood Glucose.: 70 mg/dL (08 May 2019 12:34)      < from: Xray Chest 1 View- PORTABLE-Routine (05.08.19 @ 11:51) >    EXAM:  XR CHEST PORTABLE ROUTINE 1V        PROCEDURE DATE:  May  8 2019         INTERPRETATION:  CLINICAL INFORMATION: Shortness of breath, assess for   pulmonary edema.    EXAM: AP portable chest     COMPARISON: Chest radiograph from 5/2/2019.    FINDINGS:    Status post median sternotomy.    Cardiomegaly.    The lungs are clear without pulmonary vascular congestion.    No pleural effusions or pneumothorax.    IMPRESSION:     Clear lungs.              SANDHYA BERUMEN M.D., RADIOLOGY RESIDENT  Thisdocument has been electronically signed.  SRIRAM HERNANDEZ M.D., ATTENDING RADIOLOGIST  This document has been electronically signed. May  8 2019  5:28PM    < end of copied text >        D-Dimer Assay, Quantitative: 243 ng/mL (05-08 @ 23:58)        RECENT CULTURES:        RESPIRATORY CULTURES:          Studies  Chest X-RAY  CT SCAN Chest   Venous Dopplers: LE:   CT Abdomen  Others

## 2019-05-09 NOTE — PROGRESS NOTE ADULT - PROBLEM SELECTOR PLAN 1
-LV reveals severe MR, [facility] CT surgery called for consultation involving possible mitral clip.  -Overnight patient likely with a flash pulm edema  -Would administer bumex 2mg IVP now, and continue bumex 2mg IVP bid, BUN and creatinine slightly improved from yesterday  -D/C BB for now due to acute pulm edema overnight  -continue hydral and isordil  -Intake and output not being monitored. Would continue strict intake and strict output while on diuretic therapy  -please obtain daily standing weights as well.

## 2019-05-09 NOTE — CHART NOTE - NSCHARTNOTEFT_GEN_A_CORE
FEMORAL SHEATH PULL NOTE    Femoral arterial sheath 7Fr venous and 4 Fr arterial sheath removed form right groin without any complications. Manual pressure held for 20 minutes with achievement of good hemostasis. No bleeding or hematoma. Patient tolerated procedure well. Positive peripheral pulses. Good capillary refill. Patient instructed to keep right lower extremity straight. RN instructed to monitor groin site for bleeding and hematoma and to follow post cardiac cath orders.

## 2019-05-09 NOTE — PROGRESS NOTE ADULT - ASSESSMENT
73YOF with hx of CAD s/p CABG, hypothyroidism, CKD, CHF, HTN, DM p/w worsening ZEE and sob.    A/p  MERVIN  Etiology?  Likely sec to CHF  Renal function slightly better  bumex increased to 2mg tid iv 5/9  monitor bmp  avoid nephrotoxic agents  **If pt planned for cardiac cath , pt is 26% risk of developing NGHIA and 1.09% risk of requiring RRT. In view of fluid overload status, no IVF prior to cath. Recommend to hold morning dose of on diuretic  on the day of procedure    CKD stage 3  baseline cr 1.5-1.8  likely sec to HTN/DM/chf  elevated PTH, vit d insufficieny, continue vit d 52287 QW x 4 dose   phos optimal    CHF  monitor daily weight and i/o  diuretics per cardio  f/u cardio

## 2019-05-09 NOTE — PROGRESS NOTE ADULT - SUBJECTIVE AND OBJECTIVE BOX
Patient seen and examined at bedside, with daughter at bedside per pt's request  overnight and this AM, pt with chest pain at rest, a/w anxiety, temporarily relieved via nitro then morphine. tropinin's elevated. tele 5 beats wide complex tachycardia. bp low normal.  Case discussed with medical team    HPI:  73YOF with hx of CAD s/p CABG, hypothyroidism, CKD, CHF, HTN, DM p/w worsening ZEE and sob. Patient usually can walk up a few steps before feeling sob, currently feeling sob after walking from living room to kitchen.  She is using 3 pillows to sleep. No cough, fevers. (+) CP, SS and constant, She experienced more ZEE for last two days.  She has no edema in lower extremety.  Last admission to [facility] was 11/18. (24 Apr 2019 10:21)      PAST MEDICAL & SURGICAL HISTORY:  GERD (gastroesophageal reflux disease)  CAD (coronary artery disease): s/p CABG  Hypothyroidism  Hyperlipidemia  Diabetes mellitus, type 2  S/P CABG (coronary artery bypass graft)      azithromycin (Hives; Pruritus)       MEDICATIONS  (STANDING):  ALBUTerol/ipratropium for Nebulization 3 milliLiter(s) Nebulizer every 6 hours  aspirin enteric coated 81 milliGRAM(s) Oral daily  atorvastatin 40 milliGRAM(s) Oral at bedtime  buDESOnide 160 MICROgram(s)/formoterol 4.5 MICROgram(s) Inhaler 2 Puff(s) Inhalation two times a day  buMETAnide Injectable 2 milliGRAM(s) IV Push two times a day  chlorhexidine 4% Liquid 1 Application(s) Topical <User Schedule>  dextrose 5%. 1000 milliLiter(s) (50 mL/Hr) IV Continuous <Continuous>  dextrose 50% Injectable 12.5 Gram(s) IV Push once  dextrose 50% Injectable 25 Gram(s) IV Push once  dextrose 50% Injectable 25 Gram(s) IV Push once  docusate sodium 100 milliGRAM(s) Oral two times a day  ergocalciferol 42506 Unit(s) Oral <User Schedule>  gabapentin 100 milliGRAM(s) Oral two times a day  heparin  Injectable 5000 Unit(s) SubCutaneous every 8 hours  hydrALAZINE 25 milliGRAM(s) Oral three times a day  influenza   Vaccine 0.5 milliLiter(s) IntraMuscular once  insulin glargine Injectable (LANTUS) 65 Unit(s) SubCutaneous at bedtime  insulin lispro (HumaLOG) corrective regimen sliding scale   SubCutaneous three times a day before meals  insulin lispro (HumaLOG) corrective regimen sliding scale   SubCutaneous at bedtime  insulin lispro Injectable (HumaLOG) 20 Unit(s) SubCutaneous three times a day with meals  isosorbide   dinitrate Tablet (ISORDIL) 10 milliGRAM(s) Oral three times a day  levothyroxine 50 MICROGram(s) Oral daily  montelukast 10 milliGRAM(s) Oral daily  pantoprazole    Tablet 40 milliGRAM(s) Oral before breakfast  polyethylene glycol 3350 17 Gram(s) Oral two times a day  senna 2 Tablet(s) Oral at bedtime  sodium chloride 0.65% Nasal 1 Spray(s) Both Nostrils three times a day  ticagrelor 90 milliGRAM(s) Oral two times a day    MEDICATIONS  (PRN):  acetaminophen   Tablet .. 650 milliGRAM(s) Oral every 6 hours PRN Mild Pain (1 - 3), Moderate Pain (4 - 6), Severe Pain (7 - 10)  ALBUTerol/ipratropium for Nebulization. 3 milliLiter(s) Nebulizer once PRN Shortness of Breath  dextrose 40% Gel 15 Gram(s) Oral once PRN Blood Glucose LESS THAN 70 milliGRAM(s)/deciliter  diphenhydrAMINE 25 milliGRAM(s) Oral every 4 hours PRN Rash and/or Itching  glucagon  Injectable 1 milliGRAM(s) IntraMuscular once PRN Glucose LESS THAN 70 milligrams/deciliter  nitroglycerin     SubLingual 0.4 milliGRAM(s) SubLingual every 5 minutes PRN Chest Pain  ondansetron Injectable 4 milliGRAM(s) IV Push once PRN Nausea and/or Vomiting      REVIEW OF SYSTEMS:  CONSTITUTIONAL: (+) anxiety. malaise.   EYES: No acute change in vision   ENT:  No tinnitus  NECK: No stiffness  RESPIRATORY: mild sob. No hemoptysis  CARDIOVASCULAR: chest pain  GASTROINTESTINAL: No hematemesis, diarrhea, melena, or hematochezia.  GENITOURINARY: No hematuria  NEUROLOGICAL: No headaches  LYMPH Nodes: No enlarged glands  ENDOCRINE: No heat or cold intolerance	    T(C): 36.9 (05-09-19 @ 10:59), Max: 36.9 (05-09-19 @ 10:59)  HR: 80 (05-09-19 @ 10:59) (73 - 89)  BP: 99/46 (05-09-19 @ 10:59) (91/47 - 115/54)  RR: 22 (05-09-19 @ 10:59) (16 - 22)  SpO2: 99% (05-09-19 @ 10:59) (92% - 100%)    PHYSICAL EXAMINATION:   Constitutional: ill appaering, mild distress  HEENT: NC, AT  Neck:  Supple  Respiratory: mild tachypnea. Adequate airflow b/l. Not using accessory muscles of respiration.  Cardiovascular: sys murmur, S1 & S2 intact, no R/G, 2+ radial pulses b/l  Gastrointestinal: Soft, NT, ND, normoactive b.s., no organomegaly/RT/rigidity  Extremities: WWP  Neurological:  Alert and awake.  No acute focal motor deficits. Crude sensation intact.     Labs and imaging reviewed    LABS:                        7.1    13.67 )-----------( 308      ( 09 May 2019 06:00 )             23.2     05-09    134<L>  |  100  |  89<H>  ----------------------------<  179<H>  4.2   |  18<L>  |  2.36<H>    Ca    8.6      09 May 2019 06:00  Mg     2.5     05-09      CARDIAC MARKERS ( 08 May 2019 23:58 )  x     / x     / 67 u/L / 5.42 ng/mL / x              CAPILLARY BLOOD GLUCOSE      POCT Blood Glucose.: 169 mg/dL (09 May 2019 08:58)  POCT Blood Glucose.: 159 mg/dL (08 May 2019 22:34)  POCT Blood Glucose.: 101 mg/dL (08 May 2019 18:30)  POCT Blood Glucose.: 84 mg/dL (08 May 2019 18:11)  POCT Blood Glucose.: 61 mg/dL (08 May 2019 17:52)  POCT Blood Glucose.: 170 mg/dL (08 May 2019 13:15)  POCT Blood Glucose.: 70 mg/dL (08 May 2019 12:53)  POCT Blood Glucose.: 70 mg/dL (08 May 2019 12:34)              RADIOLOGY & ADDITIONAL STUDIES:

## 2019-05-10 LAB
ANION GAP SERPL CALC-SCNC: 16 MMO/L — HIGH (ref 7–14)
APPEARANCE UR: CLEAR — SIGNIFICANT CHANGE UP
BILIRUB UR-MCNC: NEGATIVE — SIGNIFICANT CHANGE UP
BLD GP AB SCN SERPL QL: NEGATIVE — SIGNIFICANT CHANGE UP
BLOOD UR QL VISUAL: NEGATIVE — SIGNIFICANT CHANGE UP
BUN SERPL-MCNC: 85 MG/DL — HIGH (ref 7–23)
CALCIUM SERPL-MCNC: 8.9 MG/DL — SIGNIFICANT CHANGE UP (ref 8.4–10.5)
CHLORIDE SERPL-SCNC: 103 MMOL/L — SIGNIFICANT CHANGE UP (ref 98–107)
CK MB BLD-MCNC: 5.35 NG/ML — HIGH (ref 1–4.7)
CK MB BLD-MCNC: 6.03 NG/ML — HIGH (ref 1–4.7)
CK SERPL-CCNC: 102 U/L — SIGNIFICANT CHANGE UP (ref 25–170)
CK SERPL-CCNC: 122 U/L — SIGNIFICANT CHANGE UP (ref 25–170)
CK SERPL-CCNC: 97 U/L — SIGNIFICANT CHANGE UP (ref 25–170)
CO2 SERPL-SCNC: 19 MMOL/L — LOW (ref 22–31)
COLOR SPEC: SIGNIFICANT CHANGE UP
CREAT SERPL-MCNC: 2.37 MG/DL — HIGH (ref 0.5–1.3)
GLUCOSE SERPL-MCNC: 62 MG/DL — LOW (ref 70–99)
GLUCOSE UR-MCNC: NEGATIVE — SIGNIFICANT CHANGE UP
HCT VFR BLD CALC: 22.1 % — LOW (ref 34.5–45)
HCT VFR BLD CALC: 22.1 % — LOW (ref 34.5–45)
HGB BLD-MCNC: 6.9 G/DL — CRITICAL LOW (ref 11.5–15.5)
HGB BLD-MCNC: 6.9 G/DL — CRITICAL LOW (ref 11.5–15.5)
KETONES UR-MCNC: NEGATIVE — SIGNIFICANT CHANGE UP
LEUKOCYTE ESTERASE UR-ACNC: NEGATIVE — SIGNIFICANT CHANGE UP
MAGNESIUM SERPL-MCNC: 2.2 MG/DL — SIGNIFICANT CHANGE UP (ref 1.6–2.6)
MCHC RBC-ENTMCNC: 28.5 PG — SIGNIFICANT CHANGE UP (ref 27–34)
MCHC RBC-ENTMCNC: 28.5 PG — SIGNIFICANT CHANGE UP (ref 27–34)
MCHC RBC-ENTMCNC: 31.2 % — LOW (ref 32–36)
MCHC RBC-ENTMCNC: 31.2 % — LOW (ref 32–36)
MCV RBC AUTO: 91.3 FL — SIGNIFICANT CHANGE UP (ref 80–100)
MCV RBC AUTO: 91.3 FL — SIGNIFICANT CHANGE UP (ref 80–100)
NITRITE UR-MCNC: NEGATIVE — SIGNIFICANT CHANGE UP
NRBC # FLD: 0.02 K/UL — SIGNIFICANT CHANGE UP (ref 0–0)
NRBC # FLD: 0.02 K/UL — SIGNIFICANT CHANGE UP (ref 0–0)
PH UR: 6 — SIGNIFICANT CHANGE UP (ref 5–8)
PHOSPHATE SERPL-MCNC: 4.9 MG/DL — HIGH (ref 2.5–4.5)
PLATELET # BLD AUTO: 308 K/UL — SIGNIFICANT CHANGE UP (ref 150–400)
PLATELET # BLD AUTO: 308 K/UL — SIGNIFICANT CHANGE UP (ref 150–400)
PMV BLD: 9.2 FL — SIGNIFICANT CHANGE UP (ref 7–13)
PMV BLD: 9.2 FL — SIGNIFICANT CHANGE UP (ref 7–13)
POTASSIUM SERPL-MCNC: 3.8 MMOL/L — SIGNIFICANT CHANGE UP (ref 3.5–5.3)
POTASSIUM SERPL-SCNC: 3.8 MMOL/L — SIGNIFICANT CHANGE UP (ref 3.5–5.3)
PROT UR-MCNC: NEGATIVE — SIGNIFICANT CHANGE UP
RBC # BLD: 2.42 M/UL — LOW (ref 3.8–5.2)
RBC # BLD: 2.42 M/UL — LOW (ref 3.8–5.2)
RBC # FLD: 16.9 % — HIGH (ref 10.3–14.5)
RBC # FLD: 16.9 % — HIGH (ref 10.3–14.5)
RH IG SCN BLD-IMP: POSITIVE — SIGNIFICANT CHANGE UP
SODIUM SERPL-SCNC: 138 MMOL/L — SIGNIFICANT CHANGE UP (ref 135–145)
SP GR SPEC: 1.01 — SIGNIFICANT CHANGE UP (ref 1–1.04)
TROPONIN T, HIGH SENSITIVITY: 472 NG/L — CRITICAL HIGH (ref ?–14)
TROPONIN T, HIGH SENSITIVITY: 525 NG/L — CRITICAL HIGH (ref ?–14)
TROPONIN T, HIGH SENSITIVITY: 606 NG/L — CRITICAL HIGH (ref ?–14)
UROBILINOGEN FLD QL: NORMAL — SIGNIFICANT CHANGE UP
WBC # BLD: 13.15 K/UL — HIGH (ref 3.8–10.5)
WBC # BLD: 13.15 K/UL — HIGH (ref 3.8–10.5)
WBC # FLD AUTO: 13.15 K/UL — HIGH (ref 3.8–10.5)
WBC # FLD AUTO: 13.15 K/UL — HIGH (ref 3.8–10.5)

## 2019-05-10 PROCEDURE — 99233 SBSQ HOSP IP/OBS HIGH 50: CPT

## 2019-05-10 PROCEDURE — 93010 ELECTROCARDIOGRAM REPORT: CPT

## 2019-05-10 RX ORDER — ACETAMINOPHEN 500 MG
975 TABLET ORAL ONCE
Refills: 0 | Status: COMPLETED | OUTPATIENT
Start: 2019-05-10 | End: 2019-05-10

## 2019-05-10 RX ORDER — POTASSIUM CHLORIDE 20 MEQ
20 PACKET (EA) ORAL ONCE
Refills: 0 | Status: COMPLETED | OUTPATIENT
Start: 2019-05-10 | End: 2019-05-10

## 2019-05-10 RX ORDER — MILRINONE LACTATE 1 MG/ML
0.25 INJECTION, SOLUTION INTRAVENOUS
Qty: 20 | Refills: 0 | Status: DISCONTINUED | OUTPATIENT
Start: 2019-05-10 | End: 2019-05-14

## 2019-05-10 RX ORDER — INSULIN GLARGINE 100 [IU]/ML
60 INJECTION, SOLUTION SUBCUTANEOUS AT BEDTIME
Refills: 0 | Status: DISCONTINUED | OUTPATIENT
Start: 2019-05-10 | End: 2019-05-11

## 2019-05-10 RX ORDER — METOPROLOL TARTRATE 50 MG
12.5 TABLET ORAL
Refills: 0 | Status: DISCONTINUED | OUTPATIENT
Start: 2019-05-10 | End: 2019-05-10

## 2019-05-10 RX ORDER — METOPROLOL TARTRATE 50 MG
12.5 TABLET ORAL DAILY
Refills: 0 | Status: DISCONTINUED | OUTPATIENT
Start: 2019-05-10 | End: 2019-05-13

## 2019-05-10 RX ADMIN — INSULIN GLARGINE 65 UNIT(S): 100 INJECTION, SOLUTION SUBCUTANEOUS at 00:09

## 2019-05-10 RX ADMIN — Medication 3 MILLILITER(S): at 22:29

## 2019-05-10 RX ADMIN — SENNA PLUS 2 TABLET(S): 8.6 TABLET ORAL at 23:28

## 2019-05-10 RX ADMIN — MILRINONE LACTATE 4.28 MICROGRAM(S)/KG/MIN: 1 INJECTION, SOLUTION INTRAVENOUS at 16:49

## 2019-05-10 RX ADMIN — Medication 20 MILLIEQUIVALENT(S): at 13:57

## 2019-05-10 RX ADMIN — Medication 81 MILLIGRAM(S): at 11:41

## 2019-05-10 RX ADMIN — TICAGRELOR 90 MILLIGRAM(S): 90 TABLET ORAL at 06:13

## 2019-05-10 RX ADMIN — Medication 1 SPRAY(S): at 14:02

## 2019-05-10 RX ADMIN — Medication 1 SPRAY(S): at 23:29

## 2019-05-10 RX ADMIN — Medication 1 SPRAY(S): at 06:13

## 2019-05-10 RX ADMIN — CHLORHEXIDINE GLUCONATE 1 APPLICATION(S): 213 SOLUTION TOPICAL at 11:44

## 2019-05-10 RX ADMIN — MONTELUKAST 10 MILLIGRAM(S): 4 TABLET, CHEWABLE ORAL at 11:41

## 2019-05-10 RX ADMIN — Medication 100 MILLIGRAM(S): at 06:15

## 2019-05-10 RX ADMIN — Medication 3 MILLILITER(S): at 10:59

## 2019-05-10 RX ADMIN — Medication 25 MILLIGRAM(S): at 17:03

## 2019-05-10 RX ADMIN — POLYETHYLENE GLYCOL 3350 17 GRAM(S): 17 POWDER, FOR SOLUTION ORAL at 17:03

## 2019-05-10 RX ADMIN — PANTOPRAZOLE SODIUM 40 MILLIGRAM(S): 20 TABLET, DELAYED RELEASE ORAL at 06:13

## 2019-05-10 RX ADMIN — Medication 975 MILLIGRAM(S): at 14:45

## 2019-05-10 RX ADMIN — Medication 3 MILLILITER(S): at 04:13

## 2019-05-10 RX ADMIN — Medication 25 MILLIGRAM(S): at 11:43

## 2019-05-10 RX ADMIN — Medication 650 MILLIGRAM(S): at 03:08

## 2019-05-10 RX ADMIN — Medication 3 MILLILITER(S): at 15:43

## 2019-05-10 RX ADMIN — Medication 25 MILLIGRAM(S): at 02:36

## 2019-05-10 RX ADMIN — POLYETHYLENE GLYCOL 3350 17 GRAM(S): 17 POWDER, FOR SOLUTION ORAL at 06:13

## 2019-05-10 RX ADMIN — Medication 650 MILLIGRAM(S): at 02:38

## 2019-05-10 RX ADMIN — BUDESONIDE AND FORMOTEROL FUMARATE DIHYDRATE 2 PUFF(S): 160; 4.5 AEROSOL RESPIRATORY (INHALATION) at 23:36

## 2019-05-10 RX ADMIN — Medication 1: at 23:32

## 2019-05-10 RX ADMIN — ISOSORBIDE DINITRATE 10 MILLIGRAM(S): 5 TABLET ORAL at 17:04

## 2019-05-10 RX ADMIN — Medication 50 MICROGRAM(S): at 06:13

## 2019-05-10 RX ADMIN — GABAPENTIN 100 MILLIGRAM(S): 400 CAPSULE ORAL at 17:03

## 2019-05-10 RX ADMIN — INSULIN GLARGINE 60 UNIT(S): 100 INJECTION, SOLUTION SUBCUTANEOUS at 23:29

## 2019-05-10 RX ADMIN — Medication 12.5 MILLIGRAM(S): at 12:26

## 2019-05-10 RX ADMIN — ISOSORBIDE DINITRATE 10 MILLIGRAM(S): 5 TABLET ORAL at 09:44

## 2019-05-10 RX ADMIN — ATORVASTATIN CALCIUM 40 MILLIGRAM(S): 80 TABLET, FILM COATED ORAL at 23:28

## 2019-05-10 RX ADMIN — Medication 975 MILLIGRAM(S): at 14:00

## 2019-05-10 RX ADMIN — GABAPENTIN 100 MILLIGRAM(S): 400 CAPSULE ORAL at 06:13

## 2019-05-10 RX ADMIN — Medication 100 MILLIGRAM(S): at 17:03

## 2019-05-10 RX ADMIN — ISOSORBIDE DINITRATE 10 MILLIGRAM(S): 5 TABLET ORAL at 02:37

## 2019-05-10 RX ADMIN — TICAGRELOR 90 MILLIGRAM(S): 90 TABLET ORAL at 17:03

## 2019-05-10 NOTE — PROGRESS NOTE ADULT - ASSESSMENT
Assessment  DMT2: 73y Female with DM T2 with hyperglycemia, on basal bolus insulin, blood sugars , FS improving she has poor PO intake,  feeling weak.  CAD: on medications, stable, monitored.  Hypothyroidism: On Synthroid 50 mcg po daily, compliant with Synthroid intake, asymptomatic.  HTN: Controlled,  on antihypertensive medications.  SOB: On Tx, oxygen.  Obesity: No strict exercise routines, not on any weight loss program, neither on low calorie diet.          Jillian Banks MD  Cell: [phone]  Office: [phone]

## 2019-05-10 NOTE — PROGRESS NOTE ADULT - SUBJECTIVE AND OBJECTIVE BOX
Chief complaint  Patient is a 73y old  Female who presents with a chief complaint of sob (10 May 2019 15:26)   Review of systems  Patient in bed, looks comfortable, no fever, no hypoglycemia.    Labs and Fingersticks  CAPILLARY BLOOD GLUCOSE      POCT Blood Glucose.: 152 mg/dL (10 May 2019 12:11)  POCT Blood Glucose.: 115 mg/dL (10 May 2019 08:32)  POCT Blood Glucose.: 82 mg/dL (10 May 2019 08:17)  POCT Blood Glucose.: 73 mg/dL (10 May 2019 08:02)  POCT Blood Glucose.: 66 mg/dL (10 May 2019 07:43)  POCT Blood Glucose.: 112 mg/dL (09 May 2019 23:33)      Anion Gap, Serum: 16 <H> (05-10 @ 06:00)  Anion Gap, Serum: 16 <H> (05-09 @ 06:00)  Anion Gap, Serum: 16 <H> (05-08 @ 23:58)      Calcium, Total Serum: 8.9 (05-10 @ 06:00)  Calcium, Total Serum: 8.6 (05-09 @ 06:00)  Calcium, Total Serum: 8.3 <L> (05-08 @ 23:58)          05-10    138  |  103  |  85<H>  ----------------------------<  62<L>  3.8   |  19<L>  |  2.37<H>    Ca    8.9      10 May 2019 06:00  Phos  4.9     05-10  Mg     2.2     05-10                          6.9    13.15 )-----------( 308      ( 10 May 2019 06:00 )             22.1     Medications  MEDICATIONS  (STANDING):  ALBUTerol/ipratropium for Nebulization 3 milliLiter(s) Nebulizer every 6 hours  aspirin enteric coated 81 milliGRAM(s) Oral daily  atorvastatin 40 milliGRAM(s) Oral at bedtime  buDESOnide 160 MICROgram(s)/formoterol 4.5 MICROgram(s) Inhaler 2 Puff(s) Inhalation two times a day  buMETAnide Infusion 0.5 mG/Hr (2.5 mL/Hr) IV Continuous <Continuous>  chlorhexidine 4% Liquid 1 Application(s) Topical <User Schedule>  dextrose 5%. 1000 milliLiter(s) (50 mL/Hr) IV Continuous <Continuous>  dextrose 50% Injectable 12.5 Gram(s) IV Push once  dextrose 50% Injectable 25 Gram(s) IV Push once  dextrose 50% Injectable 25 Gram(s) IV Push once  docusate sodium 100 milliGRAM(s) Oral two times a day  ergocalciferol 63886 Unit(s) Oral <User Schedule>  gabapentin 100 milliGRAM(s) Oral two times a day  hydrALAZINE 25 milliGRAM(s) Oral three times a day  influenza   Vaccine 0.5 milliLiter(s) IntraMuscular once  insulin glargine Injectable (LANTUS) 60 Unit(s) SubCutaneous at bedtime  insulin lispro (HumaLOG) corrective regimen sliding scale   SubCutaneous three times a day before meals  insulin lispro (HumaLOG) corrective regimen sliding scale   SubCutaneous at bedtime  insulin lispro Injectable (HumaLOG) 20 Unit(s) SubCutaneous three times a day with meals  isosorbide   dinitrate Tablet (ISORDIL) 10 milliGRAM(s) Oral three times a day  levothyroxine 50 MICROGram(s) Oral daily  metoprolol succinate ER 12.5 milliGRAM(s) Oral daily  montelukast 10 milliGRAM(s) Oral daily  pantoprazole    Tablet 40 milliGRAM(s) Oral before breakfast  polyethylene glycol 3350 17 Gram(s) Oral two times a day  senna 2 Tablet(s) Oral at bedtime  sodium chloride 0.65% Nasal 1 Spray(s) Both Nostrils three times a day  ticagrelor 90 milliGRAM(s) Oral two times a day      Physical Exam  General: Patient comfortable in bed  Vital Signs Last 12 Hrs  T(F): 99 (05-10-19 @ 14:00), Max: 99.1 (05-10-19 @ 13:00)  HR: 89 (05-10-19 @ 15:00) (80 - 93)  BP: 109/46 (05-10-19 @ 15:00) (87/37 - 110/50)  BP(mean): 61 (05-10-19 @ 15:00) (47 - 62)  RR: 29 (05-10-19 @ 15:00) (19 - 31)  SpO2: 100% (05-10-19 @ 15:00) (96% - 100%)  Neck: No palpable thyroid nodules.  CVS: S1S2, No murmurs  Respiratory: No wheezing, no crepitations  GI: Abdomen soft, bowel sounds positive  Musculoskeletal:  edema lower extremities.   Skin: No skin rashes, no ecchymosis    Diagnostics    Thyroid Stimulating Hormone, Serum: AM Sched. Collection: 07-May-2019 06:00 (05-06 @ 10:38)  Free Thyroxine, Serum: AM Sched. Collection: 07-May-2019 06:00 (05-06 @ 10:38)

## 2019-05-10 NOTE — PROGRESS NOTE ADULT - SUBJECTIVE AND OBJECTIVE BOX
Subjective: Improved breathing     Medications:  acetaminophen   Tablet .. 650 milliGRAM(s) Oral every 6 hours PRN  ALBUTerol/ipratropium for Nebulization 3 milliLiter(s) Nebulizer every 6 hours  aspirin enteric coated 81 milliGRAM(s) Oral daily  atorvastatin 40 milliGRAM(s) Oral at bedtime  buDESOnide 160 MICROgram(s)/formoterol 4.5 MICROgram(s) Inhaler 2 Puff(s) Inhalation two times a day  buMETAnide Infusion 0.5 mG/Hr IV Continuous <Continuous>  docusate sodium 100 milliGRAM(s) Oral two times a day  ergocalciferol 48978 Unit(s) Oral <User Schedule>  gabapentin 100 milliGRAM(s) Oral two times a day  glucagon  Injectable 1 milliGRAM(s) IntraMuscular once PRN  hydrALAZINE 25 milliGRAM(s) Oral three times a day  insulin glargine Injectable (LANTUS) 65 Unit(s) SubCutaneous at bedtime  insulin lispro (HumaLOG) corrective regimen sliding scale   SubCutaneous three times a day before meals  insulin lispro (HumaLOG) corrective regimen sliding scale   SubCutaneous at bedtime  insulin lispro Injectable (HumaLOG) 20 Unit(s) SubCutaneous three times a day with meals  isosorbide   dinitrate Tablet (ISORDIL) 10 milliGRAM(s) Oral three times a day  levothyroxine 50 MICROGram(s) Oral daily  montelukast 10 milliGRAM(s) Oral daily  pantoprazole    Tablet 40 milliGRAM(s) Oral before breakfast  polyethylene glycol 3350 17 Gram(s) Oral two times a day  senna 2 Tablet(s) Oral at bedtime  sodium chloride 0.65% Nasal 1 Spray(s) Both Nostrils three times a day  ticagrelor 90 milliGRAM(s) Oral two times a day      Physical Exam:    Vitals:  T(C): 36.9 (05-10-19 @ 04:00), Max: 37.2 (05-10-19 @ 00:00)  HR: 80 (05-10-19 @ 08:00) (72 - 92)  BP: 90/46 (05-10-19 @ 08:00) (87/37 - 117/79)  BP(mean): 57 (05-10-19 @ 08:00) (47 - 89)  RR: 19 (05-10-19 @ 08:00) (19 - 34)  SpO2: 99% (05-10-19 @ 08:00) (96% - 100%)    Vital Signs Last 24 Hrs  T(C): 36.9 (10 May 2019 04:00), Max: 37.2 (10 May 2019 00:00)  T(F): 98.4 (10 May 2019 04:00), Max: 99 (10 May 2019 00:00)  HR: 80 (10 May 2019 08:00) (72 - 92)  BP: 90/46 (10 May 2019 08:00) (87/37 - 117/79)  BP(mean): 57 (10 May 2019 08:00) (47 - 89)  RR: 19 (10 May 2019 08:00) (19 - 34)  SpO2: 99% (10 May 2019 08:00) (96% - 100%)    Daily     Daily Weight in k.5 (10 May 2019 06:00)    I&O's Summary    09 May 2019 07:01  -  10 May 2019 07:00  --------------------------------------------------------  IN: 325 mL / OUT: 2000 mL / NET: -1675 mL        General: No distress. Comfortable.    Neck: Neck supple. JVP elevated. No masses  Chest: Fine crackles to auscultation bilaterally  CV: Normal S1 and S2. + murmur. Radial pulses normal.  Abdomen: Soft, non-distended, non-tender  Skin: No rashes or skin breakdown  Neurology: Alert and oriented times three. Sensation intact  EXT: No edema, warm   Psych: Affect normal    Labs:                        6.9    13.15 )-----------( 308      ( 10 May 2019 06:00 )             22.1     05-10    138  |  103  |  85<H>  ----------------------------<  62<L>  3.8   |  19<L>  |  2.37<H>    Ca    8.9      10 May 2019 06:00  Mg     2.5         Cardiac Cath Lab - Adult (19 @ 17:37)   HEMODYNAMICS: There is severe pulmonary hypertension.  VENTRICLES: No left ventriculogram was performed.  CORONARY VESSELS: The coronary circulation is right dominant.  LM:   --  Distal left main: There was a 0 % stenosis at the site of a prior  stent.  LAD:   --  Proximal LAD: There was a tubular 70 % stenosis. There was PARUL  grade 3 flow through the vessel (brisk flow).  --  Mid LAD: There was a 100 % stenosis. There was PARUL grade 0 flow  through the vessel (no flow).  CX:   --  Circumflex: Angiography showed minor luminal irregularities with  no flow limiting lesions.  RI:   --  Ostial ramus intermedius: The distal vessel was supplied by  collaterals from the LAD. There was a 100 % stenosis at the site of a  prior stent. There was PARUL grade 0 flow through the vessel (no flow).  RCA:   --  Mid RCA: The distal vessel was supplied by collaterals from  septal branches of LAD. There was a 100 % stenosis just after RV marginal  2. There was PARUL grade 0 flow through the vessel (no flow).  GRAFTS:   --  Graft to the mid LAD: The graft was a LIMA. It was normal.        Cardiac Cath Lab - Adult (19 @ 17:37)  Aortic Pressure (S/D/M): 105/44/68  Pressures:  -- Left Ventricle (s/edp): 109/25/  Pressures:  -- Pulmonary Artery (S/D/M): 69/20/41  Pressures:  -- Pulmonary Capillary Wedge: 25/43/27  Pressures:  -- Right Atrium (a/v/M): 18/18/14  Pressures:  -- Right Ventricle (s/edp): 71/17/--  O2 Sats:  -- PA: 7.1/39.3/3.79  Outputs:  -- OUTPUTS: CO by Chapin: 3.36  Outputs:  -- OUTPUTS: Chapin cardiac index: 2.37

## 2019-05-10 NOTE — PROGRESS NOTE ADULT - SUBJECTIVE AND OBJECTIVE BOX
Patient is a 73y old  Female who presents with a chief complaint of sob (10 May 2019 10:44)      Any change in ROS: feels SOB : events nOTed:   S/P Cath: HAS MULTIVESSEL DISEASE:     MEDICATIONS  (STANDING):  ALBUTerol/ipratropium for Nebulization 3 milliLiter(s) Nebulizer every 6 hours  aspirin enteric coated 81 milliGRAM(s) Oral daily  atorvastatin 40 milliGRAM(s) Oral at bedtime  buDESOnide 160 MICROgram(s)/formoterol 4.5 MICROgram(s) Inhaler 2 Puff(s) Inhalation two times a day  buMETAnide Infusion 0.5 mG/Hr (2.5 mL/Hr) IV Continuous <Continuous>  chlorhexidine 4% Liquid 1 Application(s) Topical <User Schedule>  dextrose 5%. 1000 milliLiter(s) (50 mL/Hr) IV Continuous <Continuous>  dextrose 50% Injectable 12.5 Gram(s) IV Push once  dextrose 50% Injectable 25 Gram(s) IV Push once  dextrose 50% Injectable 25 Gram(s) IV Push once  docusate sodium 100 milliGRAM(s) Oral two times a day  ergocalciferol 71812 Unit(s) Oral <User Schedule>  gabapentin 100 milliGRAM(s) Oral two times a day  hydrALAZINE 25 milliGRAM(s) Oral three times a day  influenza   Vaccine 0.5 milliLiter(s) IntraMuscular once  insulin glargine Injectable (LANTUS) 60 Unit(s) SubCutaneous at bedtime  insulin lispro (HumaLOG) corrective regimen sliding scale   SubCutaneous three times a day before meals  insulin lispro (HumaLOG) corrective regimen sliding scale   SubCutaneous at bedtime  insulin lispro Injectable (HumaLOG) 20 Unit(s) SubCutaneous three times a day with meals  isosorbide   dinitrate Tablet (ISORDIL) 10 milliGRAM(s) Oral three times a day  levothyroxine 50 MICROGram(s) Oral daily  metoprolol succinate ER 12.5 milliGRAM(s) Oral daily  montelukast 10 milliGRAM(s) Oral daily  pantoprazole    Tablet 40 milliGRAM(s) Oral before breakfast  polyethylene glycol 3350 17 Gram(s) Oral two times a day  potassium chloride    Tablet ER 20 milliEquivalent(s) Oral once  senna 2 Tablet(s) Oral at bedtime  sodium chloride 0.65% Nasal 1 Spray(s) Both Nostrils three times a day  ticagrelor 90 milliGRAM(s) Oral two times a day    MEDICATIONS  (PRN):  acetaminophen   Tablet .. 650 milliGRAM(s) Oral every 6 hours PRN Mild Pain (1 - 3), Moderate Pain (4 - 6), Severe Pain (7 - 10)  dextrose 40% Gel 15 Gram(s) Oral once PRN Blood Glucose LESS THAN 70 milliGRAM(s)/deciliter  glucagon  Injectable 1 milliGRAM(s) IntraMuscular once PRN Glucose LESS THAN 70 milligrams/deciliter    Vital Signs Last 24 Hrs  T(C): 36.6 (10 May 2019 09:28), Max: 37.2 (10 May 2019 00:00)  T(F): 97.9 (10 May 2019 09:28), Max: 99 (10 May 2019 00:00)  HR: 83 (10 May 2019 11:00) (72 - 92)  BP: 102/46 (10 May 2019 11:00) (87/37 - 117/79)  BP(mean): 60 (10 May 2019 11:00) (47 - 89)  RR: 25 (10 May 2019 11:00) (19 - 34)  SpO2: 100% (10 May 2019 11:00) (96% - 100%)    I&O's Summary    09 May 2019 07:  -  10 May 2019 07:00  --------------------------------------------------------  IN: 325 mL / OUT: 2000 mL / NET: -1675 mL    10 May 2019 07:01  -  10 May 2019 12:28  --------------------------------------------------------  IN: 415 mL / OUT: 600 mL / NET: -185 mL          Physical Exam:   GENERAL: NAD, well-groomed, well-developed  HEENT: MOHSEN/   Atraumatic, Normocephalic  ENMT: No tonsillar erythema, exudates, or enlargement; Moist mucous membranes, Good dentition, No lesions  NECK: Supple, No JVD, Normal thyroid  CHEST/LUNG: Crackles bilaterally::   CVS: Regular rate and rhythm; No murmurs, rubs, or gallops  GI: : Soft, Nontender, Nondistended; Bowel sounds present  NERVOUS SYSTEM:  Alert & Oriented X3  EXTREMITIES:  +edema  LYMPH: No lymphadenopathy noted  SKIN: No rashes or lesions  ENDOCRINOLOGY: No Thyromegaly  PSYCH: Appropriate    Labs:    CARDIAC MARKERS ( 10 May 2019 09:30 )  x     / x     / 122 u/L / 6.03 ng/mL / x      CARDIAC MARKERS ( 10 May 2019 06:00 )  x     / x     / 102 u/L / x     / x      CARDIAC MARKERS ( 09 May 2019 14:00 )  x     / x     / 97 u/L / x     / x      CARDIAC MARKERS ( 08 May 2019 23:58 )  x     / x     / 67 u/L / 5.42 ng/mL / x                                6.9    13.15 )-----------( 308      ( 10 May 2019 06:00 )             22.1                         7.1    13.67 )-----------( 308      ( 09 May 2019 06:00 )             23.2                         7.2    14.81 )-----------( 309      ( 08 May 2019 23:58 )             23.3                         8.0    14.45 )-----------( 354      ( 08 May 2019 07:30 )             26.1                         8.3    15.34 )-----------( 379      ( 07 May 2019 07:00 )             27.3     05-10    138  |  103  |  85<H>  ----------------------------<  62<L>  3.8   |  19<L>  |  2.37<H>  05-09    134<L>  |  100  |  89<H>  ----------------------------<  179<H>  4.2   |  18<L>  |  2.36<H>  05-08    137  |  103  |  90<H>  ----------------------------<  145<H>  4.4   |  18<L>  |  2.40<H>  05-08    136  |  103  |  93<H>  ----------------------------<  71  3.8   |  17<L>  |  2.56<H>  05-07    141  |  107  |  82<H>  ----------------------------<  93  4.1   |  17<L>  |  2.41<H>    Ca    8.9      10 May 2019 06:00  Ca    8.6      09 May 2019 06:00  Ca    8.3<L>      08 May 2019 23:58  Phos  4.9     05-10  Mg     2.2     05-10  Mg     2.5     05-09  Mg     2.4     05-08      CAPILLARY BLOOD GLUCOSE      POCT Blood Glucose.: 152 mg/dL (10 May 2019 12:11)  POCT Blood Glucose.: 115 mg/dL (10 May 2019 08:32)  POCT Blood Glucose.: 82 mg/dL (10 May 2019 08:17)  POCT Blood Glucose.: 73 mg/dL (10 May 2019 08:02)  POCT Blood Glucose.: 66 mg/dL (10 May 2019 07:43)  POCT Blood Glucose.: 112 mg/dL (09 May 2019 23:33)  POCT Blood Glucose.: 174 mg/dL (09 May 2019 13:05)          Urinalysis Basic - ( 10 May 2019 09:30 )    Color: LIGHT YELLOW / Appearance: CLEAR / S.010 / pH: 6.0  Gluc: NEGATIVE / Ketone: NEGATIVE  / Bili: NEGATIVE / Urobili: NORMAL   Blood: NEGATIVE / Protein: NEGATIVE / Nitrite: NEGATIVE   Leuk Esterase: NEGATIVE / RBC: x / WBC x   Sq Epi: x / Non Sq Epi: x / Bacteria: x      D-Dimer Assay, Quantitative: 243 ng/mL ( @ 23:58)        RECENT CULTURES:        RESPIRATORY CULTURES:    < from: Xray Chest 1 View-PORTABLE IMMEDIATE (19 @ 01:31) >    EXAM:  XR CHEST PORTABLE IMMED 1V        PROCEDURE DATE:  May  9 2019         INTERPRETATION:  TIME OF EXAM: May 9, 2019 at 12:21 AM.    CLINICAL INFORMATION: Chest pain. Shortness of breath.    COMPARISON:  May 8, 2019.    TECHNIQUE:   AP Portablechest x-ray.    INTERPRETATION:     Heart size and the mediastinum cannot be accurately evaluated on this   projection. Median sternotomy sutures and surgical clips are again seen.  There is new interstitial pulmonary edema. No pleural effusion or   pneumothorax is seen.            IMPRESSION:  New interstitial pulmonary edema.                  CATRACHITA KEENAN M.D., ATTENDING RADIOLOGIST  This document has been electronically signed. May  9 2019  8:17AM              < end of copied text >        Studies  Chest X-RAY  CT SCAN Chest   Venous Dopplers: LE:   CT Abdomen  Others

## 2019-05-10 NOTE — PROGRESS NOTE ADULT - PROBLEM SELECTOR PLAN 1
She is on diuretics: awaiting TRANSFER TO [facility]: RECED EXTRAS DOSES OF BUMEX TODAY  5/10: pt is in adhf: cath noted: being aggressively diuresed: Most of her breathing is secondary to chf: May use bipap for resp support if required:

## 2019-05-10 NOTE — PROVIDER CONTACT NOTE (EICU) - BACKGROUND
Originally on step down then CCU for acute decompensated HF, back out to floor. Earlier today had new CP and CXR c/w pulmonary edema. Now s/p CATH w/ no intervention on bumex gtt
adm from cath lab yesterday post stent and now on bumex drip for chf

## 2019-05-10 NOTE — PROGRESS NOTE ADULT - ATTENDING COMMENTS
SEEMS TO BE DOING OK : FOR CTS EVALUATION!  5/9: sob secondary to severe MR and chf exacerbation: for surgical evaluation:  5/10: May use bipap if requiried for increasing work of breathing: >? for alonso clip: defer to ccu

## 2019-05-10 NOTE — PROGRESS NOTE ADULT - ASSESSMENT
74 YO F w/o h/o CAD, s/p CABG Systolic CHF, CKD 3b, who p/w sob and pino.    - Acute on Chronic Systolic CHF Exacerbation, and Severe Mitral Valve Regurgitation:      - c/w diuresis      - s/p cardiac cath      - ccu and cards management greatly appreciated        - severe systolic dysfunction with severe MVR - pt/family agreeable to mitral valve clip - ?outpt/inpt intervention      - c/w cardiac meds      - PT, strict i/o, tele     - Symptomatic Anemia:      - concerning for gastrointestinal bleed       - prbc transfusion      - monitor h/h, maintain hgb > 7     - Acute Gout Flare:       - improved    - NSTEMI:      - asa/brilinta/statin      - adjust management prn      - tele    - MERVIN on CKD      - stable renal function.  trend renal function.      - optimize comorbidities. Avoid nephrotoxic rx     - DM II with long term complications of hyperglycemia, hypoglcyemia, and nephropathy   - c/w dm rx, as above    - monitor FS closely given hypoglycemia       - complicated by poor nutritional compliance (pt fed food from outside the hospital)       - c/w dm rx      - dm education

## 2019-05-10 NOTE — PROGRESS NOTE ADULT - PROBLEM SELECTOR PLAN 2
chest pain is stable. on dual antiplatelet therapy. management per cardiology   per cardiology some point heart cath  : per cardiology : she is on diuretics!  : stable: cont current medications!  : has severe MR for anjali in AM  : ANJALI reviewed: has severe MR:  5/3: cts evalaution pendin/4: Cont currtn rx:  : cont rx:  : stabke/1  : has severe MR: defer to primary team for further options: no intervention from pulmonary side:  : CONT diuretics: per cts now: for surgery  5/10: Has severe multivessel disease: cont current management per CCU:

## 2019-05-10 NOTE — PROGRESS NOTE ADULT - SUBJECTIVE AND OBJECTIVE BOX
PATIENT:  SELVIN CLAIRE  [id number]    CHIEF COMPLAINT:  Patient is a 73y old  Female who presents with a chief complaint of sob (09 May 2019 14:37)      INTERVAL HISTORYOVERNIGHT EVENTS:  Outputed 2L urine, bumex decreased to 0.5mg/hr. HgB slowly downtrended and now < 7, pending txf.        MEDICATIONS:  MEDICATIONS  (STANDING):  ALBUTerol/ipratropium for Nebulization 3 milliLiter(s) Nebulizer every 6 hours  aspirin enteric coated 81 milliGRAM(s) Oral daily  atorvastatin 40 milliGRAM(s) Oral at bedtime  buDESOnide 160 MICROgram(s)/formoterol 4.5 MICROgram(s) Inhaler 2 Puff(s) Inhalation two times a day  buMETAnide Infusion 0.5 mG/Hr (2.5 mL/Hr) IV Continuous <Continuous>  chlorhexidine 4% Liquid 1 Application(s) Topical <User Schedule>  dextrose 5%. 1000 milliLiter(s) (50 mL/Hr) IV Continuous <Continuous>  dextrose 50% Injectable 12.5 Gram(s) IV Push once  dextrose 50% Injectable 25 Gram(s) IV Push once  dextrose 50% Injectable 25 Gram(s) IV Push once  docusate sodium 100 milliGRAM(s) Oral two times a day  ergocalciferol 83401 Unit(s) Oral <User Schedule>  gabapentin 100 milliGRAM(s) Oral two times a day  hydrALAZINE 25 milliGRAM(s) Oral three times a day  influenza   Vaccine 0.5 milliLiter(s) IntraMuscular once  insulin glargine Injectable (LANTUS) 65 Unit(s) SubCutaneous at bedtime  insulin lispro (HumaLOG) corrective regimen sliding scale   SubCutaneous three times a day before meals  insulin lispro (HumaLOG) corrective regimen sliding scale   SubCutaneous at bedtime  insulin lispro Injectable (HumaLOG) 20 Unit(s) SubCutaneous three times a day with meals  isosorbide   dinitrate Tablet (ISORDIL) 10 milliGRAM(s) Oral three times a day  levothyroxine 50 MICROGram(s) Oral daily  montelukast 10 milliGRAM(s) Oral daily  pantoprazole    Tablet 40 milliGRAM(s) Oral before breakfast  polyethylene glycol 3350 17 Gram(s) Oral two times a day  senna 2 Tablet(s) Oral at bedtime  sodium chloride 0.65% Nasal 1 Spray(s) Both Nostrils three times a day  ticagrelor 90 milliGRAM(s) Oral two times a day    MEDICATIONS  (PRN):  acetaminophen   Tablet .. 650 milliGRAM(s) Oral every 6 hours PRN Mild Pain (1 - 3), Moderate Pain (4 - 6), Severe Pain (7 - 10)  dextrose 40% Gel 15 Gram(s) Oral once PRN Blood Glucose LESS THAN 70 milliGRAM(s)/deciliter  glucagon  Injectable 1 milliGRAM(s) IntraMuscular once PRN Glucose LESS THAN 70 milligrams/deciliter      ALLERGIES:  Allergies    azithromycin (Hives; Pruritus)    Intolerances        OBJECTIVE:  ICU Vital Signs Last 24 Hrs  T(C): 36.9 (10 May 2019 04:00), Max: 37.2 (10 May 2019 00:00)  T(F): 98.4 (10 May 2019 04:00), Max: 99 (10 May 2019 00:00)  HR: 86 (10 May 2019 07:00) (72 - 92)  BP: 100/46 (10 May 2019 07:00) (87/37 - 117/79)  BP(mean): 58 (10 May 2019 07:00) (47 - 89)  ABP: --  ABP(mean): --  RR: 30 (10 May 2019 07:00) (18 - 34)  SpO2: 100% (10 May 2019 07:00) (92% - 100%)    POCT Blood Glucose.: 112 mg/dL (09 May 2019 23:33)  POCT Blood Glucose.: 174 mg/dL (09 May 2019 13:05)  POCT Blood Glucose.: 169 mg/dL (09 May 2019 08:58)    CAPILLARY BLOOD GLUCOSE    POCT Blood Glucose.: 112 mg/dL (09 May 2019 23:33)    I&O's Summary    09 May 2019 07:01  -  10 May 2019 07:00  --------------------------------------------------------  IN: 325 mL / OUT: 2000 mL / NET: -1675 mL      Daily     Daily Weight in k.5 (10 May 2019 06:00)    PHYSICAL EXAMINATION:  General: WN/WD NAD  HEENT: PERRLA, EOMI, moist mucous membranes  Neurology: A&Ox3, nonfocal, CUADRA x 4  Respiratory: CTA B/L, normal respiratory effort, no wheezes, crackles, rales  CV: RRR, S1S2, no murmurs, rubs or gallops  Abdominal: Soft, NT, ND +BS, Last BM  Extremities: No edema, + peripheral pulses  Incisions:   Tubes:    LABS:                          6.9    13.15 )-----------( 308      ( 10 May 2019 06:00 )             22.1     05-10    138  |  103  |  85<H>  ----------------------------<  62<L>  3.8   |  19<L>  |  2.37<H>    Ca    8.9      10 May 2019 06:00  Mg     2.5               Creatine Kinase, Serum: 102 u/L (05-10 @ 06:00)  Creatine Kinase, Serum: 97 u/L ( @ 14:00)    CARDIAC MARKERS ( 10 May 2019 06:00 )  x     / x     / 102 u/L / x     / x      CARDIAC MARKERS ( 09 May 2019 14:00 )  x     / x     / 97 u/L / x     / x      CARDIAC MARKERS ( 08 May 2019 23:58 )  x     / x     / 67 u/L / 5.42 ng/mL / x              TELEMETRY:     EKG:     IMAGING: PATIENT:  SELVIN CLAIRE  [id number]    CHIEF COMPLAINT:  Patient is a 73y old  Female who presents with a chief complaint of sob (09 May 2019 14:37)      INTERVAL HISTORYOVERNIGHT EVENTS:  Outputed 2L urine, bumex decreased to 0.5mg/hr. HgB slowly downtrended and now < 7, pending txf.    This AM spoke to pt via [facility]  ID [id number]. Pt complains of diffuse weakness, denies any focal points. Endorses breathing status is improved but has difficulty speaking in full sentences. Denies chest pain. Also complains of abdominal bloating, believes from constipation (last BM 2 days ago). W/ regards to HgB drop, denies any BRBRPR or melena (occasionally notices small amounts of blood on toilet paper after passing hard stools).      At approx 9a, noted to have regular narrow complex tachycardia to 160s. BP during tachycardia in 60/40s, episode associated w/ chest heaviness but no pain. Spontaneously broke in 2-3 minutes back to NSR 80s w/ improvement of sBP 100/40.    MEDICATIONS:  MEDICATIONS  (STANDING):  ALBUTerol/ipratropium for Nebulization 3 milliLiter(s) Nebulizer every 6 hours  aspirin enteric coated 81 milliGRAM(s) Oral daily  atorvastatin 40 milliGRAM(s) Oral at bedtime  buDESOnide 160 MICROgram(s)/formoterol 4.5 MICROgram(s) Inhaler 2 Puff(s) Inhalation two times a day  buMETAnide Infusion 0.5 mG/Hr (2.5 mL/Hr) IV Continuous <Continuous>  chlorhexidine 4% Liquid 1 Application(s) Topical <User Schedule>  dextrose 5%. 1000 milliLiter(s) (50 mL/Hr) IV Continuous <Continuous>  dextrose 50% Injectable 12.5 Gram(s) IV Push once  dextrose 50% Injectable 25 Gram(s) IV Push once  dextrose 50% Injectable 25 Gram(s) IV Push once  docusate sodium 100 milliGRAM(s) Oral two times a day  ergocalciferol 54313 Unit(s) Oral <User Schedule>  gabapentin 100 milliGRAM(s) Oral two times a day  hydrALAZINE 25 milliGRAM(s) Oral three times a day  influenza   Vaccine 0.5 milliLiter(s) IntraMuscular once  insulin glargine Injectable (LANTUS) 65 Unit(s) SubCutaneous at bedtime  insulin lispro (HumaLOG) corrective regimen sliding scale   SubCutaneous three times a day before meals  insulin lispro (HumaLOG) corrective regimen sliding scale   SubCutaneous at bedtime  insulin lispro Injectable (HumaLOG) 20 Unit(s) SubCutaneous three times a day with meals  isosorbide   dinitrate Tablet (ISORDIL) 10 milliGRAM(s) Oral three times a day  levothyroxine 50 MICROGram(s) Oral daily  montelukast 10 milliGRAM(s) Oral daily  pantoprazole    Tablet 40 milliGRAM(s) Oral before breakfast  polyethylene glycol 3350 17 Gram(s) Oral two times a day  senna 2 Tablet(s) Oral at bedtime  sodium chloride 0.65% Nasal 1 Spray(s) Both Nostrils three times a day      MEDICATIONS  (PRN):  acetaminophen   Tablet .. 650 milliGRAM(s) Oral every 6 hours PRN Mild Pain (1 - 3), Moderate Pain (4 - 6), Severe Pain (7 - 10)  dextrose 40% Gel 15 Gram(s) Oral once PRN Blood Glucose LESS THAN 70 milliGRAM(s)/deciliter  glucagon  Injectable 1 milliGRAM(s) IntraMuscular once PRN Glucose LESS THAN 70 milligrams/deciliter      ALLERGIES:  Allergies    azithromycin (Hives; Pruritus)    Intolerances        OBJECTIVE:  ICU Vital Signs Last 24 Hrs  T(C): 36.9 (10 May 2019 04:00), Max: 37.2 (10 May 2019 00:00)  T(F): 98.4 (10 May 2019 04:00), Max: 99 (10 May 2019 00:00)  HR: 86 (10 May 2019 07:00) (72 - 92)  BP: 100/46 (10 May 2019 07:00) (87/37 - 117/79)  BP(mean): 58 (10 May 2019 07:00) (47 - 89)  ABP: --  ABP(mean): --  RR: 30 (10 May 2019 07:00) (18 - 34)  SpO2: 100% (10 May 2019 07:00) (92% - 100%)    POCT Blood Glucose.: 112 mg/dL (09 May 2019 23:33)  POCT Blood Glucose.: 174 mg/dL (09 May 2019 13:05)  POCT Blood Glucose.: 169 mg/dL (09 May 2019 08:58)    CAPILLARY BLOOD GLUCOSE    POCT Blood Glucose.: 112 mg/dL (09 May 2019 23:33)    I&O's Summary    09 May 2019 07:01  -  10 May 2019 07:00  --------------------------------------------------------  IN: 325 mL / OUT: 2000 mL / NET: -1675 mL      Daily     Daily Weight in k.5 (10 May 2019 06:00)    PHYSICAL EXAMINATION:  General: Appears uncomfortable w/ accessory muscle use in breathing. Unable to speak more than 3-4 words w/out pausing.  HEENT: JOON ALBARRAN.  Neurology: A&Ox3, nonfocal, CUADRA x 4  Respiratory: Diffuse BL crackles to apex of lungs.   CV: Loud holosystolic murmur. RRR.  Abdominal: Soft, NT, mildly distended, +BS, Last BM 2 days ago.  Extremities: 2+ pedal and BLE edema to knees, mildly worse R > L.     LABS:                          6.9    13.15 )-----------( 308      ( 10 May 2019 06:00 )             22.1     05-10    138  |  103  |  85<H>  ----------------------------<  62<L>  3.8   |  19<L>  |  2.37<H>    Ca    8.9      10 May 2019 06:00  Mg     2.5         Creatine Kinase, Serum: 102 u/L (05-10 @ 06:00)  Creatine Kinase, Serum: 97 u/L ( @ 14:00)    CARDIAC MARKERS ( 10 May 2019 06:00 )  x     / x     / 102 u/L / x     / x      CARDIAC MARKERS ( 09 May 2019 14:00 )  x     / x     / 97 u/L / x     / x      CARDIAC MARKERS ( 08 May 2019 23:58 )  x     / x     / 67 u/L / 5.42 ng/mL / x              TELEMETRY:   NSR 80s, rare PVCs.    IMAGING:  < from: Xray Chest 1 View-PORTABLE IMMEDIATE (05.09.19 @ 01:31) >  INTERPRETATION:     Heart size and the mediastinum cannot be accurately evaluated on this   projection. Median sternotomy sutures and surgical clips are again seen.  There is new interstitial pulmonary edema. No pleural effusion or   pneumothorax is seen.            IMPRESSION:  New interstitial pulmonary edema.    < end of copied text > PATIENT:  SELVIN CLAIRE  [id number]    CHIEF COMPLAINT:  Patient is a 73y old  Female who presents with a chief complaint of sob (09 May 2019 14:37)      INTERVAL HISTORYOVERNIGHT EVENTS:  Outputed 2L urine, bumex decreased to 0.5mg/hr. HgB slowly downtrended and now < 7, pending txf.    This AM spoke to pt via [facility]  ID [id number]. Pt complains of diffuse weakness, denies any focal points. Endorses breathing status is improved but has difficulty speaking in full sentences. Denies chest pain. Also complains of abdominal bloating, believes from constipation (last BM 2 days ago). W/ regards to HgB drop, denies any BRBRPR or melena (occasionally notices small amounts of blood on toilet paper after passing hard stools).      At approx 9a, noted to have regular narrow complex tachycardia to 160s. BP during tachycardia in 60/40s, episode associated w/ chest heaviness but no pain. Spontaneously broke in 2-3 minutes back to NSR 80s w/ improvement of sBP 100/40.    MEDICATIONS:  MEDICATIONS  (STANDING):  ALBUTerol/ipratropium for Nebulization 3 milliLiter(s) Nebulizer every 6 hours  aspirin enteric coated 81 milliGRAM(s) Oral daily  atorvastatin 40 milliGRAM(s) Oral at bedtime  buDESOnide 160 MICROgram(s)/formoterol 4.5 MICROgram(s) Inhaler 2 Puff(s) Inhalation two times a day  buMETAnide Infusion 0.5 mG/Hr (2.5 mL/Hr) IV Continuous <Continuous>  chlorhexidine 4% Liquid 1 Application(s) Topical <User Schedule>  dextrose 5%. 1000 milliLiter(s) (50 mL/Hr) IV Continuous <Continuous>  dextrose 50% Injectable 12.5 Gram(s) IV Push once  dextrose 50% Injectable 25 Gram(s) IV Push once  dextrose 50% Injectable 25 Gram(s) IV Push once  docusate sodium 100 milliGRAM(s) Oral two times a day  ergocalciferol 41914 Unit(s) Oral <User Schedule>  gabapentin 100 milliGRAM(s) Oral two times a day  hydrALAZINE 25 milliGRAM(s) Oral three times a day  influenza   Vaccine 0.5 milliLiter(s) IntraMuscular once  insulin glargine Injectable (LANTUS) 65 Unit(s) SubCutaneous at bedtime  insulin lispro (HumaLOG) corrective regimen sliding scale   SubCutaneous three times a day before meals  insulin lispro (HumaLOG) corrective regimen sliding scale   SubCutaneous at bedtime  insulin lispro Injectable (HumaLOG) 20 Unit(s) SubCutaneous three times a day with meals  isosorbide   dinitrate Tablet (ISORDIL) 10 milliGRAM(s) Oral three times a day  levothyroxine 50 MICROGram(s) Oral daily  montelukast 10 milliGRAM(s) Oral daily  pantoprazole    Tablet 40 milliGRAM(s) Oral before breakfast  polyethylene glycol 3350 17 Gram(s) Oral two times a day  senna 2 Tablet(s) Oral at bedtime  sodium chloride 0.65% Nasal 1 Spray(s) Both Nostrils three times a day      MEDICATIONS  (PRN):  acetaminophen   Tablet .. 650 milliGRAM(s) Oral every 6 hours PRN Mild Pain (1 - 3), Moderate Pain (4 - 6), Severe Pain (7 - 10)  dextrose 40% Gel 15 Gram(s) Oral once PRN Blood Glucose LESS THAN 70 milliGRAM(s)/deciliter  glucagon  Injectable 1 milliGRAM(s) IntraMuscular once PRN Glucose LESS THAN 70 milligrams/deciliter      ALLERGIES:  Allergies    azithromycin (Hives; Pruritus)    Intolerances        OBJECTIVE:  ICU Vital Signs Last 24 Hrs  T(C): 36.9 (10 May 2019 04:00), Max: 37.2 (10 May 2019 00:00)  T(F): 98.4 (10 May 2019 04:00), Max: 99 (10 May 2019 00:00)  HR: 86 (10 May 2019 07:00) (72 - 92)  BP: 100/46 (10 May 2019 07:00) (87/37 - 117/79)  BP(mean): 58 (10 May 2019 07:00) (47 - 89)  ABP: --  ABP(mean): --  RR: 30 (10 May 2019 07:00) (18 - 34)  SpO2: 100% (10 May 2019 07:00) (92% - 100%)    POCT Blood Glucose.: 112 mg/dL (09 May 2019 23:33)  POCT Blood Glucose.: 174 mg/dL (09 May 2019 13:05)  POCT Blood Glucose.: 169 mg/dL (09 May 2019 08:58)    CAPILLARY BLOOD GLUCOSE    POCT Blood Glucose.: 112 mg/dL (09 May 2019 23:33)    I&O's Summary    09 May 2019 07:01  -  10 May 2019 07:00  --------------------------------------------------------  IN: 325 mL / OUT: 2000 mL / NET: -1675 mL      Daily     Daily Weight in k.5 (10 May 2019 06:00)    PHYSICAL EXAMINATION:  General: Appears uncomfortable w/ accessory muscle use in breathing. Unable to speak more than 3-4 words w/out pausing.  HEENT: JOON ALBARRAN.  Neurology: A&Ox3, nonfocal, CUADRA x 4  Respiratory: Diffuse BL crackles to apex of lungs.   CV: Loud holosystolic murmur. RRR.  Abdominal: Soft, NT, mildly distended, +BS, Last BM 2 days ago.  Extremities: 2+ pedal and BLE edema to knees, mildly worse R > L.     LABS:                          6.9    13.15 )-----------( 308      ( 10 May 2019 06:00 )             22.1     05-10    138  |  103  |  85<H>  ----------------------------<  62<L>  3.8   |  19<L>  |  2.37<H>    Ca    8.9      10 May 2019 06:00  Mg     2.5         Creatine Kinase, Serum: 102 u/L (05-10 @ 06:00)  Creatine Kinase, Serum: 97 u/L ( @ 14:00)    CARDIAC MARKERS ( 10 May 2019 06:00 )  x     / x     / 102 u/L / x     / x      CARDIAC MARKERS ( 09 May 2019 14:00 )  x     / x     / 97 u/L / x     / x      CARDIAC MARKERS ( 08 May 2019 23:58 )  x     / x     / 67 u/L / 5.42 ng/mL / x              TELEMETRY:   NSR 80s, rare PVCs.    IMAGING:  < from: Xray Chest 1 View-PORTABLE IMMEDIATE (19 @ 01:31) >  INTERPRETATION:     Heart size and the mediastinum cannot be accurately evaluated on this   projection. Median sternotomy sutures and surgical clips are again seen.  There is new interstitial pulmonary edema. No pleural effusion or   pneumothorax is seen.            IMPRESSION:  New interstitial pulmonary edema.    < end of copied text >    < from: Cardiac Cath Lab - Adult (19 @ 17:37) >  HEMODYNAMICS: There is severe pulmonary hypertension.  VENTRICLES: No left ventriculogram was performed.  CORONARY VESSELS: The coronary circulation is right dominant.  LM:   --  Distal left main: There was a 0 % stenosis at the site of a prior  stent.  LAD:   --  Proximal LAD: There was a tubular 70 % stenosis. There was PARUL  grade 3 flow through the vessel (brisk flow).  --  Mid LAD: There was a 100 % stenosis. There was PARUL grade 0 flow  through the vessel (no flow).  CX:   --  Circumflex: Angiography showed minor luminal irregularities with  no flow limiting lesions.  RI:   --  Ostial ramus intermedius: The distal vessel was supplied by  collaterals from the LAD. There was a 100 % stenosis at the site of a  prior stent. There was PARUL grade 0 flow through the vessel (no flow).  RCA:   --  Mid RCA: The distal vessel was supplied by collaterals from  septal branches of LAD. There was a 100 % stenosis just after RV marginal  2. There was PARUL grade 0 flow through the vessel (no flow).  GRAFTS:   --  Graft to the mid LAD: The graft was a LIMA. It was normal.  COMPLICATIONS: There were no complications.  DIAGNOSTIC RECOMMENDATIONS: Patient management should include close  monitoring of BUN and creatinine. Medical management is recommended.  Prepared and signed by  Tor Prado M.D.  Signed 2019 18:53:31  HEMODYNAMIC TABLES  Pressures:  NO PHASE  Pressures:  - HR: 80  Pressures:  - Rhythm:  Pressures:  -- Aortic Pressure (S/D/M): 105/44/68  Pressures:  -- Left Ventricle (s/edp): 109/25/--  Pressures:  -- Pulmonary Artery (S/D/M): 69/20/41  Pressures:  -- Pulmonary Capillary Wedge: 25/43/27  Pressures:  -- Right Atrium (a/v/M): 18/18/14  Pressures:  -- Right Ventricle (s/edp): 71/17/--  O2 Sats:  NO PHASE  O2 Sats: - HR: 80  O2 Sats:  - Rhythm:  O2 Sats:  -- AO: 7.2/92.7/9.08  O2 Sats:  -- PA: 7.1/39.3/3.79  Outputs:  NO PHASE  Outputs:  -- CALCULATIONS[age]  Outputs:  -- CALCULATIONS: Body Surface Area: 1.42  Outputs:  -- CALCULATIONS: Height in cm: 137.00  Outputs:  -- CALCULATIONS: Sex: Female  Outputs:  -- CALCULATIONS: Weight in k.00  Outputs:  -- OUTPUTS: CO by Chapin: 3.36  Outputs:  -- OUTPUTS: Chapin cardiac index: 2.37  Outputs:  -- OUTPUTS: O2 consumption: 177.27  Outputs:  -- OUTPUTS: Vo2 Indexed: 125.00  Outputs:  -- RESISTANCES: Left ventricular stroke work: 23.39  Outputs:  -- RESISTANCES: Left Ventricular Stroke Work index: 16.49  Outputs:  -- RESISTANCES: Pulmonary vascular index (dsc): 473.18  Outputs:  -- RESISTANCES: Pulmonary vascular index (Wood Units): 5.92  Outputs:  -- RESISTANCES: Pulmonary vascular resistance (dsc): 333.65  Outputs:  -- RESISTANCES: Pulmonary vascular resistance (Wood Units): 4.17  Outputs:  -- RESISTANCES: PVR_SVR Ratio: 0.26  Outputs:  -- RESISTANCES: Right ventricular stroke work: 15.40  Outputs:  -- RESISTANCES: Right ventricular stroke work index: 10.86  Outputs:  -- RESISTANCES: Systemic vascular index (dsc): 1825.13  Outputs:  -- RESISTANCES: Systemic vascular index (Wood Units): 22.82  Outputs:  -- RESISTANCES: Systemic vascular resistance (dsc): 1286.94  Outputs:  -- RESISTANCES: Systemic vascular resistance (Wood Units): 16.09  Outputs:  -- RESISTANCES: Total pulmonary index (dsc): 1385.75  Outputs:  -- RESISTANCES: Total pulmonary index (Wood Units): 17.33  Outputs:  -- RESISTANCES: Total pulmonary resistance (dsc): 977.12  Outputs:  -- RESISTANCES: Total pulmonary resistance (Wood Units): 12.22  Outputs:  -- RESISTANCES: Total vascular index (Wood Units): 28.74  Outputs:  -- RESISTANCES: Total vascular resistance (dsc): 1620.59  Outputs:  -- RESISTANCES: Total vascular resistance (Wood Units): 20.26  Outputs:  -- RESISTANCES: Total vascular resistance index (dsc): 2298.32  Outputs:  -- RESISTANCES: TPR_TVR Ratio: 0.60  Outputs:  -- SHUNTS: Pulmonary flow: 3.36  Outputs:  -- SHUNTS: Qp Indexed: 2.37  Outputs:  -- SHUNTS: Qs Indexed: 2.37  Outputs:  -- SHUNTS: Systemic flow: 3.36    < end of copied text >

## 2019-05-10 NOTE — PROGRESS NOTE ADULT - ASSESSMENT
74 y/o female with pmhx of HTN, DM, CAD s/p CABG, HFrEF (LVEF 25%) severe MR, severe pulm HTN comes in with cough and SOB.   CT chest showed ground glass opacities, patient was started on IV zithromax, developed rash, now on Levaquin She was admitted to telemetry was given a dose of lopressor, patient became hypotensive and went into respiratory distress, patient was then moved to CCU. HF was consulted to help manage this patient who is SOB and is hypotensive. She admits to SOB at rest, orthopnea, cough. Denies fevers and sick contacts. No CP, palpitations, dizziness. S/P LHC: TVD with pant LIMA, RHC PA 69/20/41, RA 14, PCWP 27, CO 3.3, CI 2.3 PA sat 39       Acute on chronic systolic heart failure.      Still volume overloaded   Good urine out with Bumex, hold inotropic support for now   Continue  Bumex 0.5mg IV gtt  Not on ACEI/ARB/ARNI due to MERVIN.   Continue hydral  to 25 mg po TID, isordil 10 mg po TID  Toprol on hold   Will uptitrate as B/Ps allow   Strict I/O and daily standing weights.   LV. Severe posterior MR. Will refer for mitral clip eval if pt and family desire, the risks (renal failure, inadeqaute sx improvement) probably outweigh the benefits.           CAD (coronary artery disease).      continue ASA 81 mg po qd   Brilinta 90 me po BID.   ?Statin 72 y/o female with pmhx of HTN, DM, CAD s/p CABG, HFrEF (LVEF 25%) severe MR, severe pulm HTN comes in with cough and SOB.   CT chest showed ground glass opacities, patient was started on IV zithromax, developed rash, now on Levaquin She was admitted to telemetry was given a dose of lopressor, patient became hypotensive and went into respiratory distress, patient was then moved to CCU. HF was consulted to help manage this patient who is SOB and is hypotensive. She admits to SOB at rest, orthopnea, cough. Denies fevers and sick contacts. No CP, palpitations, dizziness. S/P LHC: TVD with pant LIMA, RHC PA 69/20/41, RA 14, PCWP 27, CO 3.3, CI 2.3 PA sat 39       Acute on chronic systolic heart failure.      Still volume overloaded   Good urine out with Bumex, hold inotropic support for now   Continue  Bumex 0.5mg IV gtt  Not on ACEI/ARB/ARNI due to MERVIN.   Continue hydral  to 25 mg po TID, isordil 10 mg po TID  Toprol on hold   Will uptitrate as B/Ps allow   Strict I/O and daily standing weights.   LV. Severe posterior MR. Will refer for mitral clip eval if pt and family desire, the risks (renal failure, inadeqaute sx improvement) probably outweigh the benefits.  Please divide PRBC, if trandfuse           CAD (coronary artery disease).      continue ASA 81 mg po qd   Brilinta 90 me po BID.   ?Statin

## 2019-05-10 NOTE — PROGRESS NOTE ADULT - SUBJECTIVE AND OBJECTIVE BOX
Patient seen and examined at bedside  s/p cardiac cath, transferred to ccu  Case discussed with medical team    HPI:  73YOF with hx of CAD s/p CABG, hypothyroidism, CKD, CHF, HTN, DM p/w worsening ZEE and sob. Patient usually can walk up a few steps before feeling sob, currently feeling sob after walking from living room to kitchen.  She is using 3 pillows to sleep. No cough, fevers. (+) CP, SS and constant, She experienced more ZEE for last two days.  She has no edema in lower extremety.  Last admission to [facility] was . (2019 10:21)      PAST MEDICAL & SURGICAL HISTORY:  GERD (gastroesophageal reflux disease)  CAD (coronary artery disease): s/p CABG  Hypothyroidism  Hyperlipidemia  Diabetes mellitus, type 2  S/P CABG (coronary artery bypass graft)      azithromycin (Hives; Pruritus)       MEDICATIONS  (STANDING):  ALBUTerol/ipratropium for Nebulization 3 milliLiter(s) Nebulizer every 6 hours  aspirin enteric coated 81 milliGRAM(s) Oral daily  atorvastatin 40 milliGRAM(s) Oral at bedtime  buDESOnide 160 MICROgram(s)/formoterol 4.5 MICROgram(s) Inhaler 2 Puff(s) Inhalation two times a day  buMETAnide Infusion 0.5 mG/Hr (2.5 mL/Hr) IV Continuous <Continuous>  chlorhexidine 4% Liquid 1 Application(s) Topical <User Schedule>  dextrose 5%. 1000 milliLiter(s) (50 mL/Hr) IV Continuous <Continuous>  dextrose 50% Injectable 12.5 Gram(s) IV Push once  dextrose 50% Injectable 25 Gram(s) IV Push once  dextrose 50% Injectable 25 Gram(s) IV Push once  docusate sodium 100 milliGRAM(s) Oral two times a day  ergocalciferol 89306 Unit(s) Oral <User Schedule>  gabapentin 100 milliGRAM(s) Oral two times a day  hydrALAZINE 25 milliGRAM(s) Oral three times a day  influenza   Vaccine 0.5 milliLiter(s) IntraMuscular once  insulin glargine Injectable (LANTUS) 60 Unit(s) SubCutaneous at bedtime  insulin lispro (HumaLOG) corrective regimen sliding scale   SubCutaneous three times a day before meals  insulin lispro (HumaLOG) corrective regimen sliding scale   SubCutaneous at bedtime  insulin lispro Injectable (HumaLOG) 20 Unit(s) SubCutaneous three times a day with meals  isosorbide   dinitrate Tablet (ISORDIL) 10 milliGRAM(s) Oral three times a day  levothyroxine 50 MICROGram(s) Oral daily  montelukast 10 milliGRAM(s) Oral daily  pantoprazole    Tablet 40 milliGRAM(s) Oral before breakfast  polyethylene glycol 3350 17 Gram(s) Oral two times a day  senna 2 Tablet(s) Oral at bedtime  sodium chloride 0.65% Nasal 1 Spray(s) Both Nostrils three times a day  ticagrelor 90 milliGRAM(s) Oral two times a day    MEDICATIONS  (PRN):  acetaminophen   Tablet .. 650 milliGRAM(s) Oral every 6 hours PRN Mild Pain (1 - 3), Moderate Pain (4 - 6), Severe Pain (7 - 10)  dextrose 40% Gel 15 Gram(s) Oral once PRN Blood Glucose LESS THAN 70 milliGRAM(s)/deciliter  glucagon  Injectable 1 milliGRAM(s) IntraMuscular once PRN Glucose LESS THAN 70 milligrams/deciliter      REVIEW OF SYSTEMS:  CONSTITUTIONAL: (+) malaise. fatigue.  EYES: No acute change in vision   ENT:  No tinnitus  NECK: No stiffness  RESPIRATORY: No hemoptysis  CARDIOVASCULAR: zee. No chest pain, palpitations, syncope  GASTROINTESTINAL: mild intermittent blood on toilet paper. No hematemesis, diarrhea.  GENITOURINARY: No hematuria  NEUROLOGICAL: No headaches  LYMPH Nodes: No enlarged glands  ENDOCRINE: No heat or cold intolerance	    T(C): 36.6 (05-10-19 @ 09:28), Max: 37.2 (05-10-19 @ 00:00)  HR: 83 (05-10-19 @ 10:00) (72 - 92)  BP: 95/42 (05-10-19 @ 10:00) (87/37 - 117/79)  RR: 21 (05-10-19 @ 10:00) (19 - 34)  SpO2: 100% (05-10-19 @ 10:00) (96% - 100%)    PHYSICAL EXAMINATION:   Constitutional: ill appearing. NAD  HEENT: NC, AT  Neck:  Supple  Respiratory:  Adequate airflow b/l. Not using accessory muscles of respiration.  Cardiovascular: sys murmur, S1 & S2 intact, no R/G, 2+ radial pulses b/l  Gastrointestinal: Soft, NT, ND, normoactive b.s., no organomegaly/RT/rigidity  Extremities: WWP  Neurological:  Alert and awake.  No acute focal motor deficits. Crude sensation intact.     Labs and imaging reviewed    LABS:                        6.9    13.15 )-----------( 308      ( 10 May 2019 06:00 )             22.1     05-10    138  |  103  |  85<H>  ----------------------------<  62<L>  3.8   |  19<L>  |  2.37<H>    Ca    8.9      10 May 2019 06:00  Mg     2.5     05-09      CARDIAC MARKERS ( 10 May 2019 09:30 )  x     / x     / 122 u/L / 6.03 ng/mL / x      CARDIAC MARKERS ( 10 May 2019 06:00 )  x     / x     / 102 u/L / x     / x      CARDIAC MARKERS ( 09 May 2019 14:00 )  x     / x     / 97 u/L / x     / x      CARDIAC MARKERS ( 08 May 2019 23:58 )  x     / x     / 67 u/L / 5.42 ng/mL / x            Urinalysis Basic - ( 10 May 2019 09:30 )    Color: LIGHT YELLOW / Appearance: CLEAR / S.010 / pH: 6.0  Gluc: NEGATIVE / Ketone: NEGATIVE  / Bili: NEGATIVE / Urobili: NORMAL   Blood: NEGATIVE / Protein: NEGATIVE / Nitrite: NEGATIVE   Leuk Esterase: NEGATIVE / RBC: x / WBC x   Sq Epi: x / Non Sq Epi: x / Bacteria: x      CAPILLARY BLOOD GLUCOSE      POCT Blood Glucose.: 115 mg/dL (10 May 2019 08:32)  POCT Blood Glucose.: 82 mg/dL (10 May 2019 08:17)  POCT Blood Glucose.: 73 mg/dL (10 May 2019 08:02)  POCT Blood Glucose.: 66 mg/dL (10 May 2019 07:43)  POCT Blood Glucose.: 112 mg/dL (09 May 2019 23:33)  POCT Blood Glucose.: 174 mg/dL (09 May 2019 13:05)              RADIOLOGY & ADDITIONAL STUDIES:

## 2019-05-10 NOTE — PROGRESS NOTE ADULT - ASSESSMENT
73YOF w/o h/o CAD, s/p CABG CHF adm to [facility] for Acute on Chronic systolic and diastolic HF

## 2019-05-10 NOTE — PROGRESS NOTE ADULT - ASSESSMENT
73YOF with hx of CAD s/p CABG, hypothyroidism, CKD, CHF, HTN, DM p/w worsening ZEE and sob.    A/p  MERVIN  Etiology?  Likely sec to CHF  Renal function stable  started on bumex gtt  monitor bmp  avoid nephrotoxic agents  **If pt planned for cardiac cath , pt is 26% risk of developing NGHIA and 1.09% risk of requiring RRT. In view of fluid overload status, no IVF prior to cath. Recommend to hold morning dose of on diuretic  on the day of procedure    CKD stage 3  baseline cr 1.5-1.8  likely sec to HTN/DM/chf  elevated PTH, vit d insufficieny, continue vit d 16813 QW x 4 dose   phos optimal    CHF  monitor daily weight and i/o  diuretics per cardio  f/u cardio 73YOF with hx of CAD s/p CABG, hypothyroidism, CKD, CHF, HTN, DM p/w worsening ZEE and sob.    A/p  MERVIN  Etiology?  Likely sec to CHF  s/p cardiac cath 05/09  Renal function stable  started on bumex gtt  monitor bmp  avoid nephrotoxic agents      CKD stage 3  baseline cr 1.5-1.8  likely sec to HTN/DM/chf  elevated PTH, vit d insufficieny, continue vit d 55764 QW x 4 dose   phos optimal    CHF  monitor daily weight and i/o  diuretics per cardio  f/u cardio

## 2019-05-10 NOTE — PROGRESS NOTE ADULT - ATTENDING COMMENTS
C results reviewed.  CO 3.3, PA sat 39%.  PCWP 27, RA 14, mean PA 41, PAD 20.  Will start milrinone 0.25 mcg/kg/min.

## 2019-05-10 NOTE — CHART NOTE - NSCHARTNOTEFT_GEN_A_CORE
CCU Transfer Note    Transfer from: CCU    Transfer to: (  ) Medicine    ( x ) Telemetry    (  ) RCU                               (  ) Palliative    (  ) Stroke Unit    (  ) MICU    (  ) 446B    Accepting Physician:    Signout given to:     HPI / CCU COURSE:  73F w/ HFrEF (2/2019 EF 20-25%, NYHA class IV), mod to severe MR, CAD s/p CABG s/p LIMA to LAD and PCI, HTN, DM, and hypothyroidism admitted for ADHF course c/b acute pulm edema. Has been net neg > 1L/day while in CCU on bumex gtt @ 0.5mg/hr. This AM had 1 episode of narrow complex tachycardia associated w/ hypotension and chest pressure. Spontaneously resolved after 2 minutes. Restarted on metoprolol. Overall course c/b MERVIN on CKD and resolved PNA s/p 7d abx.        Vital Signs Last 24 Hrs  T(C): 37.2 (10 May 2019 14:00), Max: 37.3 (10 May 2019 13:00)  T(F): 99 (10 May 2019 14:00), Max: 99.1 (10 May 2019 13:00)  HR: 89 (10 May 2019 15:00) (72 - 93)  BP: 109/46 (10 May 2019 15:00) (87/37 - 117/79)  BP(mean): 61 (10 May 2019 15:00) (47 - 89)  RR: 29 (10 May 2019 15:00) (19 - 34)  SpO2: 100% (10 May 2019 15:00) (96% - 100%)    I&O's Summary    09 May 2019 07:01  -  10 May 2019 07:00  --------------------------------------------------------  IN: 325 mL / OUT: 2000 mL / NET: -1675 mL    10 May 2019 07:01  -  10 May 2019 15:38  --------------------------------------------------------  IN: 705 mL / OUT: 1600 mL / NET: -895 mL        Physical Exam:   PHYSICAL EXAMINATION:  General: Appears uncomfortable w/ accessory muscle use in breathing. Unable to speak more than 3-4 words w/out pausing.  HEENT: PERRLA, EOMI.  Neurology: A&Ox3, nonfocal, CUADRA x 4  Respiratory: Diffuse BL crackles to apex of lungs.   CV: Loud holosystolic murmur. RRR.  Abdominal: Soft, NT, mildly distended, +BS, Last BM 2 days ago.  Extremities: 2+ pedal and BLE edema to knees, mildly worse R > L.       LABS:   CARDIAC MARKERS ( 10 May 2019 14:30 )  x     / x     / 97 u/L / 5.35 ng/mL / x      CARDIAC MARKERS ( 10 May 2019 09:30 )  x     / x     / 122 u/L / 6.03 ng/mL / x      CARDIAC MARKERS ( 10 May 2019 06:00 )  x     / x     / 102 u/L / x     / x      CARDIAC MARKERS ( 09 May 2019 14:00 )  x     / x     / 97 u/L / x     / x      CARDIAC MARKERS ( 08 May 2019 23:58 )  x     / x     / 67 u/L / 5.42 ng/mL / x                                  6.9    13.15 )-----------( 308      ( 10 May 2019 06:00 )             22.1       05-10    138  |  103  |  85<H>  ----------------------------<  62<L>  3.8   |  19<L>  |  2.37<H>    Ca    8.9      10 May 2019 06:00  Phos  4.9     05-10  Mg     2.2     05-10                  ECG:    Telemetry:    Imaging:      ASSESSMENT & PLAN:     73F w/ HFrEF (2/2019 EF 20-25%, NYHA class IV), mod to severe MR, CAD s/p CABG s/p LIMA to LAD and PCI, HTN, DM, and hypothyroidism admitted for ADHF course c/b acute pulm edema. Overall course c/b MERVIN on CKD and resolved PNA s/p 7d abx.    #Cardiovascular  -remains in ADHF w/ evidence hypervolemia on exam.           -net neg 1.7L O/N on IV bumex 0.5mg/hr  -episode of symptomatic regular narrow complex tachycardia to 160s w/ hypotension this AM w/ spontaneous resolution           -will restart metoprolol 12.5mg BID.           -no prior hx of SVT/WPW/afib.   -s/p LHC + RHC w/ patent LIMA graft, severe multivessel disease (pLAD 70%, mLAD 100%, ramus 100%, %) + evidence R heart overload           -no interventions performed.           -previous discussion of possible MV clip after optimization  -on hydral 25 TID + isordil 10 TID for afterload reduction  -cw home ASA, brillenta, atorvastatin 40 for hx of CAD  -5/1 LV and 4/26 TTE w/ severe mitral regurg w/ posterior tethering, severe LV systolic dysfunction, severe pulm HTN, and patent foramen ovale.    #Pulmonary  -new acute pulm edema on 5/9 CXR associated w/ worsening SOB + chest pain          -received 500cc IVF on 5/8  -diuresis per above w/ bumex gtt  -SpO2 stable on NC but still has significantly increased work of breathing.    #Renal  -diuresis per above. will place cuellar today for monitoring.   -sCr elevated from baseline but stable in 2.3-2.5s (baseline 1.5-1.8).  -maintaining adequate UOP  -Nephro following.    #GI  -PO PPI ppx    #Endocrine  -4/25/19 A1c 7.3, well controlled on insulin  -recently several episodes of asymptomatic hypoglycemia to 60s-70s  -will reduce lantus to 60. maintain lispro 20 QAC w/ SSI.     #ID  -persistent leukocytosis but afebrile w/ no localizing sx  -possibly reactive? pending diff.  -monitor off abx. s/p 7d abx (azithro/levo) for PNA on admission.    #Heme  -normocytic anemia w/ downtrending HgB  -no evidence of active bleeding. denies melena.   -will consider GI consult if persistently worsening HgB after txf for GIB.    #DVT/Prophylaxis   -will discuss re-starting SQH now fem sheath removed.      FOR FOLLOW UP:  [ ] Post txf CBC (received x1u pRBC today divided)  [ ] Initiation of SQH for DVT ppx    [ ]     Mecca Pham, PGY-1   [phone] CCU Transfer Note    Transfer from: CCU    Transfer to: (  ) Medicine    ( x ) Telemetry    (  ) RCU                               (  ) Palliative    (  ) Stroke Unit    (  ) MICU    (  ) 449I    Accepting Physician: Dr. Velarde aware.    Signout given to: Claudette Ryan    HPI / CCU COURSE:  73F w/ HFrEF (2/2019 EF 20-25%, NYHA class IV), mod to severe MR, CAD s/p CABG s/p LIMA to LAD and PCI, HTN, DM, and hypothyroidism admitted for ADHF course c/b acute pulm edema. Has been net neg > 1L/day while in CCU on bumex gtt @ 0.5mg/hr. This AM had 1 episode of narrow complex tachycardia associated w/ hypotension and chest pressure. Spontaneously resolved after 2 minutes. Restarted on metoprolol. Overall course c/b MERVIN on CKD and resolved PNA s/p 7d abx.        Vital Signs Last 24 Hrs  T(C): 37.2 (10 May 2019 14:00), Max: 37.3 (10 May 2019 13:00)  T(F): 99 (10 May 2019 14:00), Max: 99.1 (10 May 2019 13:00)  HR: 89 (10 May 2019 15:00) (72 - 93)  BP: 109/46 (10 May 2019 15:00) (87/37 - 117/79)  BP(mean): 61 (10 May 2019 15:00) (47 - 89)  RR: 29 (10 May 2019 15:00) (19 - 34)  SpO2: 100% (10 May 2019 15:00) (96% - 100%)    I&O's Summary    09 May 2019 07:01  -  10 May 2019 07:00  --------------------------------------------------------  IN: 325 mL / OUT: 2000 mL / NET: -1675 mL    10 May 2019 07:01  -  10 May 2019 15:38  --------------------------------------------------------  IN: 705 mL / OUT: 1600 mL / NET: -895 mL        Physical Exam:   PHYSICAL EXAMINATION:  General: Appears uncomfortable w/ accessory muscle use in breathing. Unable to speak more than 3-4 words w/out pausing.  HEENT: PERRLA, EOMI.  Neurology: A&Ox3, nonfocal, CUADRA x 4  Respiratory: Diffuse BL crackles to apex of lungs.   CV: Loud holosystolic murmur. RRR.  Abdominal: Soft, NT, mildly distended, +BS, Last BM 2 days ago.  Extremities: 2+ pedal and BLE edema to knees, mildly worse R > L.       LABS:   CARDIAC MARKERS ( 10 May 2019 14:30 )  x     / x     / 97 u/L / 5.35 ng/mL / x      CARDIAC MARKERS ( 10 May 2019 09:30 )  x     / x     / 122 u/L / 6.03 ng/mL / x      CARDIAC MARKERS ( 10 May 2019 06:00 )  x     / x     / 102 u/L / x     / x      CARDIAC MARKERS ( 09 May 2019 14:00 )  x     / x     / 97 u/L / x     / x      CARDIAC MARKERS ( 08 May 2019 23:58 )  x     / x     / 67 u/L / 5.42 ng/mL / x                                  6.9    13.15 )-----------( 308      ( 10 May 2019 06:00 )             22.1       05-10    138  |  103  |  85<H>  ----------------------------<  62<L>  3.8   |  19<L>  |  2.37<H>    Ca    8.9      10 May 2019 06:00  Phos  4.9     05-10  Mg     2.2     05-10                  ECG:    Telemetry:    Imaging:      ASSESSMENT & PLAN:     73F w/ HFrEF (2/2019 EF 20-25%, NYHA class IV), mod to severe MR, CAD s/p CABG s/p LIMA to LAD and PCI, HTN, DM, and hypothyroidism admitted for ADHF course c/b acute pulm edema. Overall course c/b MERVIN on CKD and resolved PNA s/p 7d abx.    #Cardiovascular  -remains in ADHF w/ evidence hypervolemia on exam.           -net neg 1.7L O/N on IV bumex 0.5mg/hr  -episode of symptomatic regular narrow complex tachycardia to 160s w/ hypotension this AM w/ spontaneous resolution           -will restart metoprolol 12.5mg BID.           -no prior hx of SVT/WPW/afib.   -s/p LHC + RHC w/ patent LIMA graft, severe multivessel disease (pLAD 70%, mLAD 100%, ramus 100%, %) + evidence R heart overload           -no interventions performed.           -previous discussion of possible MV clip after optimization  -on hydral 25 TID + isordil 10 TID for afterload reduction  -cw home ASA, brillenta, atorvastatin 40 for hx of CAD  -5/1 LV and 4/26 TTE w/ severe mitral regurg w/ posterior tethering, severe LV systolic dysfunction, severe pulm HTN, and patent foramen ovale.    #Pulmonary  -new acute pulm edema on 5/9 CXR associated w/ worsening SOB + chest pain          -received 500cc IVF on 5/8  -diuresis per above w/ bumex gtt  -SpO2 stable on NC but still has significantly increased work of breathing.    #Renal  -diuresis per above. will place cuellar today for monitoring.   -sCr elevated from baseline but stable in 2.3-2.5s (baseline 1.5-1.8).  -maintaining adequate UOP  -Nephro following.    #GI  -PO PPI ppx    #Endocrine  -4/25/19 A1c 7.3, well controlled on insulin  -recently several episodes of asymptomatic hypoglycemia to 60s-70s  -will reduce lantus to 60. maintain lispro 20 QAC w/ SSI.     #ID  -persistent leukocytosis but afebrile w/ no localizing sx  -possibly reactive? pending diff.  -monitor off abx. s/p 7d abx (azithro/levo) for PNA on admission.    #Heme  -normocytic anemia w/ downtrending HgB  -no evidence of active bleeding. denies melena.   -will consider GI consult if persistently worsening HgB after txf for GIB.    #DVT/Prophylaxis   -will discuss re-starting SQH now fem sheath removed.      FOR FOLLOW UP:  [ ] Post txf CBC (received x1u pRBC today divided)  [ ] Initiation of SQH for DVT ppx    [ ] If persistent drops in HgB, consider GI consult for occult GIB.    Mecca Pham, PGY-1   [phone] CCU Transfer Note    Transfer from: CCU    Transfer to: (  ) Medicine    ( x ) Telemetry    (  ) RCU                               (  ) Palliative    (  ) Stroke Unit    (  ) MICU    (  ) 441F    Accepting Physician: Dr. Velarde aware.    Signout given to: Claudette Ryan    HPI / CCU COURSE:  73F w/ HFrEF (2/2019 EF 20-25%, NYHA class IV), mod to severe MR, CAD s/p CABG s/p LIMA to LAD and PCI, HTN, DM, and hypothyroidism admitted for ADHF course c/b acute pulm edema. Has been net neg > 1L/day while in CCU on bumex gtt @ 0.5mg/hr. This AM had 1 episode of narrow complex tachycardia associated w/ hypotension and chest pressure. Spontaneously resolved after 2 minutes. Restarted on metoprolol. Overall course c/b MERVIN on CKD and resolved PNA s/p 7d abx.        Vital Signs Last 24 Hrs  T(C): 37.2 (10 May 2019 14:00), Max: 37.3 (10 May 2019 13:00)  T(F): 99 (10 May 2019 14:00), Max: 99.1 (10 May 2019 13:00)  HR: 89 (10 May 2019 15:00) (72 - 93)  BP: 109/46 (10 May 2019 15:00) (87/37 - 117/79)  BP(mean): 61 (10 May 2019 15:00) (47 - 89)  RR: 29 (10 May 2019 15:00) (19 - 34)  SpO2: 100% (10 May 2019 15:00) (96% - 100%)    I&O's Summary    09 May 2019 07:01  -  10 May 2019 07:00  --------------------------------------------------------  IN: 325 mL / OUT: 2000 mL / NET: -1675 mL    10 May 2019 07:01  -  10 May 2019 15:38  --------------------------------------------------------  IN: 705 mL / OUT: 1600 mL / NET: -895 mL        Physical Exam:   PHYSICAL EXAMINATION:  General: Appears uncomfortable w/ accessory muscle use in breathing. Unable to speak more than 3-4 words w/out pausing.  HEENT: PERRLA, EOMI.  Neurology: A&Ox3, nonfocal, CUADRA x 4  Respiratory: Diffuse BL crackles to apex of lungs.   CV: Loud holosystolic murmur. RRR.  Abdominal: Soft, NT, mildly distended, +BS, Last BM 2 days ago.  Extremities: 2+ pedal and BLE edema to knees, mildly worse R > L.       LABS:   CARDIAC MARKERS ( 10 May 2019 14:30 )  x     / x     / 97 u/L / 5.35 ng/mL / x      CARDIAC MARKERS ( 10 May 2019 09:30 )  x     / x     / 122 u/L / 6.03 ng/mL / x      CARDIAC MARKERS ( 10 May 2019 06:00 )  x     / x     / 102 u/L / x     / x      CARDIAC MARKERS ( 09 May 2019 14:00 )  x     / x     / 97 u/L / x     / x      CARDIAC MARKERS ( 08 May 2019 23:58 )  x     / x     / 67 u/L / 5.42 ng/mL / x                                  6.9    13.15 )-----------( 308      ( 10 May 2019 06:00 )             22.1       05-10    138  |  103  |  85<H>  ----------------------------<  62<L>  3.8   |  19<L>  |  2.37<H>    Ca    8.9      10 May 2019 06:00  Phos  4.9     05-10  Mg     2.2     05-10                  ECG:    Telemetry:    Imaging:      ASSESSMENT & PLAN:     73F w/ HFrEF (2/2019 EF 20-25%, NYHA class IV), mod to severe MR, CAD s/p CABG s/p LIMA to LAD and PCI, HTN, DM, and hypothyroidism admitted for ADHF course c/b acute pulm edema. Overall course c/b MERVIN on CKD and resolved PNA s/p 7d abx.    #Cardiovascular  -remains in ADHF w/ evidence hypervolemia on exam.           -net neg 1.7L O/N on IV bumex 0.5mg/hr  -episode of symptomatic regular narrow complex tachycardia to 160s w/ hypotension this AM w/ spontaneous resolution           -will restart metoprolol 12.5mg BID.           -no prior hx of SVT/WPW/afib.   -s/p LHC + RHC w/ patent LIMA graft, severe multivessel disease (pLAD 70%, mLAD 100%, ramus 100%, %) + evidence R heart overload           -no interventions performed.           -previous discussion of possible MV clip after optimization  -on hydral 25 TID + isordil 10 TID for afterload reduction  -cw home ASA, brillenta, atorvastatin 40 for hx of CAD  -5/1 LV and 4/26 TTE w/ severe mitral regurg w/ posterior tethering, severe LV systolic dysfunction, severe pulm HTN, and patent foramen ovale.    #Pulmonary  -new acute pulm edema on 5/9 CXR associated w/ worsening SOB + chest pain          -received 500cc IVF on 5/8  -diuresis per above w/ bumex gtt  -SpO2 stable on NC but still has significantly increased work of breathing.    #Renal  -diuresis per above. will place cuellar today for monitoring.   -sCr elevated from baseline but stable in 2.3-2.5s (baseline 1.5-1.8).  -maintaining adequate UOP  -Nephro following.    #GI  -PO PPI ppx    #Endocrine  -4/25/19 A1c 7.3, well controlled on insulin  -recently several episodes of asymptomatic hypoglycemia to 60s-70s  -will reduce lantus to 60. maintain lispro 20 QAC w/ SSI.     #ID  -persistent leukocytosis but afebrile w/ no localizing sx  -possibly reactive? pending diff.  -monitor off abx. s/p 7d abx (azithro/levo) for PNA on admission.    #Heme  -normocytic anemia w/ downtrending HgB  -no evidence of active bleeding. denies melena.   -will consider GI consult if persistently worsening HgB after txf for GIB.    #DVT/Prophylaxis   -will discuss re-starting SQH now fem sheath removed.      FOR FOLLOW UP:  [ ] Post txf CBC (received x1u pRBC today divided)  [ ] Initiation of SQH for DVT ppx    [ ] F/up FOBT. If persistent drops in HgB, consider GI consult for occult GIB.    Mecca Pham, PGY-1   [phone]

## 2019-05-10 NOTE — PROGRESS NOTE ADULT - ASSESSMENT
73F w/ HFrEF (2/2019 EF 20-25%, NYHA class IV), mod to severe MR, CAD s/p CABG s/p LIMA to LAD and PCI, HTN, DM, and hypothyroidism admitted for ADHF course c/b acute pulm edema. Overall course c/b MERVIN on CKD and resolved PNA s/p 7d abx.    #Cardiovascular  -net neg 1.7L O/N on IV bumex 0.5mg/hr  -5/1 LV and 4/26 TTE w/ severe mitral regurg w/ posterior tethering, severe LV systolic dysfunction, severe pulm HTN, and patent foramen ovale.    #Pulmonary  -new acute pulm edema on 5/9 CXR associated w/ worsening SOB + chest pain          -received 500cc IVF on 5/8    #Renal  #GI  #Endocrine  #ID  #Heme  #Derm/Ophtho  #Nutrition  #DVT/Prophylaxis   #Dispo 73F w/ HFrEF (2/2019 EF 20-25%, NYHA class IV), mod to severe MR, CAD s/p CABG s/p LIMA to LAD and PCI, HTN, DM, and hypothyroidism admitted for ADHF course c/b acute pulm edema. Overall course c/b MERVIN on CKD and resolved PNA s/p 7d abx.    #Cardiovascular  -remains in ADHF w/ evidence hypervolemia on exam.           -net neg 1.7L O/N on IV bumex 0.5mg/hr  -episode of symptomatic regular narrow complex tachycardia to 160s w/ hypotension this AM w/ spontaneous resolution           -d/c'ed BB yesterday for ADHF           -no prior hx of SVT/WPW/afib.   -chest pain yesterday w/ respiratory distress. mildly uptrending troponins           -trending cardiac enzymes to peak           -s/p cath 5/9. patent LIMA graft. obstruction pLAD 70%, mLAD   -on hydral 25 TID + isordil 10 TID for afterload reduction  -cw home ASA, brillenta, atorvastatin 40 for hx of CAD  -5/1 LV and 4/26 TTE w/ severe mitral regurg w/ posterior tethering, severe LV systolic dysfunction, severe pulm HTN, and patent foramen ovale.    #Pulmonary  -new acute pulm edema on 5/9 CXR associated w/ worsening SOB + chest pain          -received 500cc IVF on 5/8  -diuresis per above w/ bumex gtt  -SpO2 stable on NC but still has significantly increased work of breathing.    #Renal  -diuresis per above. will place cuellar today for monitoring.   -sCr elevated from baseline but stable in 2.3-2.5s (baseline 1.5-1.8).  -maintaining adequate UOP    #GI  -PO PPI ppx    #Endocrine  -4/25/19 A1c 7.3, well controlled on insulin  -recently several episodes of asymptomatic hypoglycemia to 60s-70s  -will reduce lantus to 60. maintain lispro 20 QAC w/ SSI.     #ID  -persistent leukocytosis but afebrile w/ no localizing sx  -possibly reactive?  -monitor off abx. s/p 7d abx (azithro/levo) for PNA on admission.    #Heme  -normocytic anemia w/ downtrending HgB  -no evidence of active bleeding    #Derm/Ophtho  #Nutrition  #DVT/Prophylaxis   #Dispo 73F w/ HFrEF (2/2019 EF 20-25%, NYHA class IV), mod to severe MR, CAD s/p CABG s/p LIMA to LAD and PCI, HTN, DM, and hypothyroidism admitted for ADHF course c/b acute pulm edema. Overall course c/b MERVIN on CKD and resolved PNA s/p 7d abx.    #Cardiovascular  -remains in ADHF w/ evidence hypervolemia on exam.           -net neg 1.7L O/N on IV bumex 0.5mg/hr  -episode of symptomatic regular narrow complex tachycardia to 160s w/ hypotension this AM w/ spontaneous resolution           -will restart metoprolol 12.5mg BID.           -no prior hx of SVT/WPW/afib.   -s/p LHC + RHC w/ patent LIMA graft, severe multivessel disease (pLAD 70%, mLAD 100%, ramus 100%, %) + evidence R heart overload           -no interventions performed.  -on hydral 25 TID + isordil 10 TID for afterload reduction  -cw home ASA, brillenta, atorvastatin 40 for hx of CAD  -previous discussion of possible MV clip after optimization  -5/1 LV and 4/26 TTE w/ severe mitral regurg w/ posterior tethering, severe LV systolic dysfunction, severe pulm HTN, and patent foramen ovale.    #Pulmonary  -new acute pulm edema on 5/9 CXR associated w/ worsening SOB + chest pain          -received 500cc IVF on 5/8  -diuresis per above w/ bumex gtt  -SpO2 stable on NC but still has significantly increased work of breathing.    #Renal  -diuresis per above. will place cuellar today for monitoring.   -sCr elevated from baseline but stable in 2.3-2.5s (baseline 1.5-1.8).  -maintaining adequate UOP    #GI  -PO PPI ppx    #Endocrine  -4/25/19 A1c 7.3, well controlled on insulin  -recently several episodes of asymptomatic hypoglycemia to 60s-70s  -will reduce lantus to 60. maintain lispro 20 QAC w/ SSI.     #ID  -persistent leukocytosis but afebrile w/ no localizing sx  -possibly reactive? pending diff.  -monitor off abx. s/p 7d abx (azithro/levo) for PNA on admission.    #Heme  -normocytic anemia w/ downtrending HgB  -no evidence of active bleeding    #DVT/Prophylaxis   -will discuss re-starting SQ[facility] now fem sheath removed. 73F w/ HFrEF (2/2019 EF 20-25%, NYHA class IV), mod to severe MR, CAD s/p CABG s/p LIMA to LAD and PCI, HTN, DM, and hypothyroidism admitted for ADHF course c/b acute pulm edema. Overall course c/b MERVIN on CKD and resolved PNA s/p 7d abx.    #Cardiovascular  -remains in ADHF w/ evidence hypervolemia on exam.           -net neg 1.7L O/N on IV bumex 0.5mg/hr  -episode of symptomatic regular narrow complex tachycardia to 160s w/ hypotension this AM w/ spontaneous resolution           -will restart metoprolol 12.5mg BID.           -no prior hx of SVT/WPW/afib.   -s/p LHC + RHC w/ patent LIMA graft, severe multivessel disease (pLAD 70%, mLAD 100%, ramus 100%, %) + evidence R heart overload           -no interventions performed.           -previous discussion of possible MV clip after optimization  -on hydral 25 TID + isordil 10 TID for afterload reduction  -cw home ASA, brillenta, atorvastatin 40 for hx of CAD  -5/1 LV and 4/26 TTE w/ severe mitral regurg w/ posterior tethering, severe LV systolic dysfunction, severe pulm HTN, and patent foramen ovale.    #Pulmonary  -new acute pulm edema on 5/9 CXR associated w/ worsening SOB + chest pain          -received 500cc IVF on 5/8  -diuresis per above w/ bumex gtt  -SpO2 stable on NC but still has significantly increased work of breathing.    #Renal  -diuresis per above. will place cuellar today for monitoring.   -sCr elevated from baseline but stable in 2.3-2.5s (baseline 1.5-1.8).  -maintaining adequate UOP  -Nephro following.    #GI  -PO PPI ppx    #Endocrine  -4/25/19 A1c 7.3, well controlled on insulin  -recently several episodes of asymptomatic hypoglycemia to 60s-70s  -will reduce lantus to 60. maintain lispro 20 QAC w/ SSI.     #ID  -persistent leukocytosis but afebrile w/ no localizing sx  -possibly reactive? pending diff.  -monitor off abx. s/p 7d abx (azithro/levo) for PNA on admission.    #Heme  -normocytic anemia w/ downtrending HgB  -no evidence of active bleeding. denies melena.   -will consider GI consult if persistently worsening HgB after txf for GIB.    #DVT/Prophylaxis   -will discuss re-starting SQ[facility] now fem sheath removed.

## 2019-05-10 NOTE — CHART NOTE - NSCHARTNOTEFT_GEN_A_CORE
Notified by RN patient complaining of chest pain.  Patient described the pain as midsternal pressure that began 30 minutes prior.  Chest pain was similar to multiple previous episodes throughout this admission.  Patient did not complain of palpitations or radiatio of pain.    T(C): 36.6 (05-10-19 @ 23:11), Max: 37.3 (05-10-19 @ 13:00)  HR: 90 (05-10-19 @ 23:11) (80 - 93)  BP: 103/58 (05-10-19 @ 23:11) (85/35 - 111/49)  RR: 20 (05-10-19 @ 20:37) (19 - 31)  SpO2: 100% (05-10-19 @ 23:11) (96% - 100%)    EKG: Sinus 90bpm with PVC largely unchanged from previous EKG    Physical Exam:  Chest: diminished in bases bilaterally  CV: Normal S1 and S2. + murmur.   Abdomen: Soft, non-distended, non-tender  EXT: No edema, warm       A/P 72 y/o female with pmhx of HTN, DM, CAD s/p CABG, HFrEF (LVEF 25%) severe MR, severe pulm HTN comes in with cough and SOB.  Now complaining of chest pain.    1. Chest pain: Likely related to multivessel coronary artery disease with a musculoskeletal component. Continue tele, check CE, continue Isordil, Bumex and Milrinone as ordered.

## 2019-05-11 LAB
ALBUMIN SERPL ELPH-MCNC: 3.8 G/DL — SIGNIFICANT CHANGE UP (ref 3.3–5)
ALP SERPL-CCNC: 74 U/L — SIGNIFICANT CHANGE UP (ref 40–120)
ALT FLD-CCNC: 28 U/L — SIGNIFICANT CHANGE UP (ref 4–33)
ANION GAP SERPL CALC-SCNC: 16 MMO/L — HIGH (ref 7–14)
AST SERPL-CCNC: 22 U/L — SIGNIFICANT CHANGE UP (ref 4–32)
BILIRUB SERPL-MCNC: 0.5 MG/DL — SIGNIFICANT CHANGE UP (ref 0.2–1.2)
BUN SERPL-MCNC: 79 MG/DL — HIGH (ref 7–23)
CALCIUM SERPL-MCNC: 8.8 MG/DL — SIGNIFICANT CHANGE UP (ref 8.4–10.5)
CHLORIDE SERPL-SCNC: 102 MMOL/L — SIGNIFICANT CHANGE UP (ref 98–107)
CK MB BLD-MCNC: 4.4 NG/ML — SIGNIFICANT CHANGE UP (ref 1–4.7)
CK SERPL-CCNC: 82 U/L — SIGNIFICANT CHANGE UP (ref 25–170)
CO2 SERPL-SCNC: 21 MMOL/L — LOW (ref 22–31)
CREAT SERPL-MCNC: 2.31 MG/DL — HIGH (ref 0.5–1.3)
GLUCOSE SERPL-MCNC: 106 MG/DL — HIGH (ref 70–99)
HCT VFR BLD CALC: 25.6 % — LOW (ref 34.5–45)
HCT VFR BLD CALC: 27 % — LOW (ref 34.5–45)
HGB BLD-MCNC: 8.3 G/DL — LOW (ref 11.5–15.5)
HGB BLD-MCNC: 8.6 G/DL — LOW (ref 11.5–15.5)
MAGNESIUM SERPL-MCNC: 2 MG/DL — SIGNIFICANT CHANGE UP (ref 1.6–2.6)
MCHC RBC-ENTMCNC: 28.3 PG — SIGNIFICANT CHANGE UP (ref 27–34)
MCHC RBC-ENTMCNC: 28.5 PG — SIGNIFICANT CHANGE UP (ref 27–34)
MCHC RBC-ENTMCNC: 31.9 % — LOW (ref 32–36)
MCHC RBC-ENTMCNC: 32.4 % — SIGNIFICANT CHANGE UP (ref 32–36)
MCV RBC AUTO: 88 FL — SIGNIFICANT CHANGE UP (ref 80–100)
MCV RBC AUTO: 88.8 FL — SIGNIFICANT CHANGE UP (ref 80–100)
NRBC # FLD: 0.03 K/UL — SIGNIFICANT CHANGE UP (ref 0–0)
NRBC # FLD: 0.04 K/UL — SIGNIFICANT CHANGE UP (ref 0–0)
PHOSPHATE SERPL-MCNC: 3.4 MG/DL — SIGNIFICANT CHANGE UP (ref 2.5–4.5)
PLATELET # BLD AUTO: 276 K/UL — SIGNIFICANT CHANGE UP (ref 150–400)
PLATELET # BLD AUTO: 301 K/UL — SIGNIFICANT CHANGE UP (ref 150–400)
PMV BLD: 9.4 FL — SIGNIFICANT CHANGE UP (ref 7–13)
PMV BLD: 9.5 FL — SIGNIFICANT CHANGE UP (ref 7–13)
POTASSIUM SERPL-MCNC: 3.7 MMOL/L — SIGNIFICANT CHANGE UP (ref 3.5–5.3)
POTASSIUM SERPL-SCNC: 3.7 MMOL/L — SIGNIFICANT CHANGE UP (ref 3.5–5.3)
PROT SERPL-MCNC: 7.4 G/DL — SIGNIFICANT CHANGE UP (ref 6–8.3)
RBC # BLD: 2.91 M/UL — LOW (ref 3.8–5.2)
RBC # BLD: 3.04 M/UL — LOW (ref 3.8–5.2)
RBC # FLD: 17.8 % — HIGH (ref 10.3–14.5)
RBC # FLD: 17.8 % — HIGH (ref 10.3–14.5)
SODIUM SERPL-SCNC: 139 MMOL/L — SIGNIFICANT CHANGE UP (ref 135–145)
TROPONIN T, HIGH SENSITIVITY: 445 NG/L — CRITICAL HIGH (ref ?–14)
WBC # BLD: 11.32 K/UL — HIGH (ref 3.8–10.5)
WBC # BLD: 11.52 K/UL — HIGH (ref 3.8–10.5)
WBC # FLD AUTO: 11.32 K/UL — HIGH (ref 3.8–10.5)
WBC # FLD AUTO: 11.52 K/UL — HIGH (ref 3.8–10.5)

## 2019-05-11 PROCEDURE — 93010 ELECTROCARDIOGRAM REPORT: CPT

## 2019-05-11 RX ORDER — INSULIN GLARGINE 100 [IU]/ML
45 INJECTION, SOLUTION SUBCUTANEOUS AT BEDTIME
Refills: 0 | Status: DISCONTINUED | OUTPATIENT
Start: 2019-05-11 | End: 2019-05-13

## 2019-05-11 RX ORDER — ALPRAZOLAM 0.25 MG
0.25 TABLET ORAL EVERY 12 HOURS
Refills: 0 | Status: DISCONTINUED | OUTPATIENT
Start: 2019-05-11 | End: 2019-05-16

## 2019-05-11 RX ORDER — INSULIN LISPRO 100/ML
4 VIAL (ML) SUBCUTANEOUS
Refills: 0 | Status: DISCONTINUED | OUTPATIENT
Start: 2019-05-11 | End: 2019-05-13

## 2019-05-11 RX ADMIN — Medication 50 MICROGRAM(S): at 05:43

## 2019-05-11 RX ADMIN — BUMETANIDE 2.5 MG/HR: 0.25 INJECTION INTRAMUSCULAR; INTRAVENOUS at 01:53

## 2019-05-11 RX ADMIN — Medication 2: at 18:00

## 2019-05-11 RX ADMIN — ERGOCALCIFEROL 50000 UNIT(S): 1.25 CAPSULE ORAL at 17:09

## 2019-05-11 RX ADMIN — Medication 4 UNIT(S): at 17:59

## 2019-05-11 RX ADMIN — ISOSORBIDE DINITRATE 10 MILLIGRAM(S): 5 TABLET ORAL at 18:01

## 2019-05-11 RX ADMIN — ISOSORBIDE DINITRATE 10 MILLIGRAM(S): 5 TABLET ORAL at 01:53

## 2019-05-11 RX ADMIN — Medication 25 MILLIGRAM(S): at 08:37

## 2019-05-11 RX ADMIN — ATORVASTATIN CALCIUM 40 MILLIGRAM(S): 80 TABLET, FILM COATED ORAL at 21:15

## 2019-05-11 RX ADMIN — MONTELUKAST 10 MILLIGRAM(S): 4 TABLET, CHEWABLE ORAL at 12:36

## 2019-05-11 RX ADMIN — INSULIN GLARGINE 45 UNIT(S): 100 INJECTION, SOLUTION SUBCUTANEOUS at 23:16

## 2019-05-11 RX ADMIN — MILRINONE LACTATE 4.28 MICROGRAM(S)/KG/MIN: 1 INJECTION, SOLUTION INTRAVENOUS at 12:37

## 2019-05-11 RX ADMIN — Medication 0.25 MILLIGRAM(S): at 23:10

## 2019-05-11 RX ADMIN — Medication 100 MILLIGRAM(S): at 17:10

## 2019-05-11 RX ADMIN — Medication 1: at 23:17

## 2019-05-11 RX ADMIN — Medication 1 SPRAY(S): at 12:39

## 2019-05-11 RX ADMIN — SENNA PLUS 2 TABLET(S): 8.6 TABLET ORAL at 21:15

## 2019-05-11 RX ADMIN — POLYETHYLENE GLYCOL 3350 17 GRAM(S): 17 POWDER, FOR SOLUTION ORAL at 05:43

## 2019-05-11 RX ADMIN — BUDESONIDE AND FORMOTEROL FUMARATE DIHYDRATE 2 PUFF(S): 160; 4.5 AEROSOL RESPIRATORY (INHALATION) at 08:38

## 2019-05-11 RX ADMIN — GABAPENTIN 100 MILLIGRAM(S): 400 CAPSULE ORAL at 17:10

## 2019-05-11 RX ADMIN — BUDESONIDE AND FORMOTEROL FUMARATE DIHYDRATE 2 PUFF(S): 160; 4.5 AEROSOL RESPIRATORY (INHALATION) at 21:14

## 2019-05-11 RX ADMIN — Medication 1 SPRAY(S): at 21:14

## 2019-05-11 RX ADMIN — Medication 12.5 MILLIGRAM(S): at 05:47

## 2019-05-11 RX ADMIN — MILRINONE LACTATE 4.28 MICROGRAM(S)/KG/MIN: 1 INJECTION, SOLUTION INTRAVENOUS at 01:55

## 2019-05-11 RX ADMIN — Medication 81 MILLIGRAM(S): at 12:36

## 2019-05-11 RX ADMIN — TICAGRELOR 90 MILLIGRAM(S): 90 TABLET ORAL at 05:43

## 2019-05-11 RX ADMIN — TICAGRELOR 90 MILLIGRAM(S): 90 TABLET ORAL at 17:10

## 2019-05-11 RX ADMIN — GABAPENTIN 100 MILLIGRAM(S): 400 CAPSULE ORAL at 05:43

## 2019-05-11 RX ADMIN — Medication 25 MILLIGRAM(S): at 01:53

## 2019-05-11 RX ADMIN — ISOSORBIDE DINITRATE 10 MILLIGRAM(S): 5 TABLET ORAL at 09:49

## 2019-05-11 RX ADMIN — Medication 1: at 13:36

## 2019-05-11 RX ADMIN — Medication 100 MILLIGRAM(S): at 05:43

## 2019-05-11 RX ADMIN — POLYETHYLENE GLYCOL 3350 17 GRAM(S): 17 POWDER, FOR SOLUTION ORAL at 17:09

## 2019-05-11 RX ADMIN — Medication 1 SPRAY(S): at 05:43

## 2019-05-11 RX ADMIN — Medication 650 MILLIGRAM(S): at 13:34

## 2019-05-11 RX ADMIN — Medication 650 MILLIGRAM(S): at 12:34

## 2019-05-11 RX ADMIN — Medication 3 MILLILITER(S): at 09:24

## 2019-05-11 RX ADMIN — Medication 25 MILLIGRAM(S): at 17:10

## 2019-05-11 RX ADMIN — PANTOPRAZOLE SODIUM 40 MILLIGRAM(S): 20 TABLET, DELAYED RELEASE ORAL at 05:43

## 2019-05-11 RX ADMIN — Medication 3 MILLILITER(S): at 05:14

## 2019-05-11 NOTE — CONSULT NOTE ADULT - SUBJECTIVE AND OBJECTIVE BOX
Patient is a 73y Female     Patient is a 73y old  Female who presents with a chief complaint of sob (11 May 2019 11:45)      HPI:  73YOF with hx of CAD s/p CABG, hypothyroidism, CKD, CHF, HTN, DM p/w worsening ZEE and sob. Patient usually can walk up a few steps before feeling sob, currently feeling sob after walking from living room to kitchen.  She is using 3 pillows to sleep. No cough, fevers. (+) CP, SS and constant, She experienced more ZEE for last two days.  She has no edema in lower extremety.  Last admission to [facility] was 11/18. (24 Apr 2019 10:21)      PAST MEDICAL & SURGICAL HISTORY:  GERD (gastroesophageal reflux disease)  CAD (coronary artery disease): s/p CABG  Hypothyroidism  Hyperlipidemia  Diabetes mellitus, type 2  S/P CABG (coronary artery bypass graft)      MEDICATIONS  (STANDING):  ALBUTerol/ipratropium for Nebulization 3 milliLiter(s) Nebulizer every 6 hours  aspirin enteric coated 81 milliGRAM(s) Oral daily  atorvastatin 40 milliGRAM(s) Oral at bedtime  buDESOnide 160 MICROgram(s)/formoterol 4.5 MICROgram(s) Inhaler 2 Puff(s) Inhalation two times a day  buMETAnide Infusion 0.5 mG/Hr (2.5 mL/Hr) IV Continuous <Continuous>  chlorhexidine 4% Liquid 1 Application(s) Topical <User Schedule>  dextrose 5%. 1000 milliLiter(s) (50 mL/Hr) IV Continuous <Continuous>  dextrose 50% Injectable 12.5 Gram(s) IV Push once  dextrose 50% Injectable 25 Gram(s) IV Push once  dextrose 50% Injectable 25 Gram(s) IV Push once  docusate sodium 100 milliGRAM(s) Oral two times a day  ergocalciferol 06182 Unit(s) Oral <User Schedule>  gabapentin 100 milliGRAM(s) Oral two times a day  hydrALAZINE 25 milliGRAM(s) Oral three times a day  influenza   Vaccine 0.5 milliLiter(s) IntraMuscular once  insulin glargine Injectable (LANTUS) 60 Unit(s) SubCutaneous at bedtime  insulin lispro (HumaLOG) corrective regimen sliding scale   SubCutaneous three times a day before meals  insulin lispro (HumaLOG) corrective regimen sliding scale   SubCutaneous at bedtime  insulin lispro Injectable (HumaLOG) 20 Unit(s) SubCutaneous three times a day with meals  isosorbide   dinitrate Tablet (ISORDIL) 10 milliGRAM(s) Oral three times a day  levothyroxine 50 MICROGram(s) Oral daily  metoprolol succinate ER 12.5 milliGRAM(s) Oral daily  milrinone Infusion 0.25 MICROgram(s)/kG/Min (4.275 mL/Hr) IV Continuous <Continuous>  montelukast 10 milliGRAM(s) Oral daily  pantoprazole    Tablet 40 milliGRAM(s) Oral before breakfast  polyethylene glycol 3350 17 Gram(s) Oral two times a day  senna 2 Tablet(s) Oral at bedtime  sodium chloride 0.65% Nasal 1 Spray(s) Both Nostrils three times a day  ticagrelor 90 milliGRAM(s) Oral two times a day      Allergies    azithromycin (Hives; Pruritus)    Intolerances        SOCIAL HISTORY:  Denies ETOh,Smoking,     FAMILY HISTORY:  Family history of hypertension (Sibling): sister  Family history of diabetes mellitus (Sibling): brothers/sisters      REVIEW OF SYSTEMS:    CONSTITUTIONAL: No weakness, fevers or chills  EYES/ENT: No visual changes;  No vertigo or throat pain   NECK: No pain or stiffness  RESPIRATORY: No cough, wheezing, hemoptysis; No shortness of breath  CARDIOVASCULAR: No chest pain or palpitations  GASTROINTESTINAL: No abdominal or epigastric pain. No nausea, vomiting, or hematemesis; No diarrhea or constipation. No melena or hematochezia.  GENITOURINARY: No dysuria, frequency or hematuria  NEUROLOGICAL: No numbness or weakness  SKIN: No itching, burning, rashes, or lesions   All other review of systems is negative unless indicated above.    VITAL:  T(C): , Max: 37.3 (05-10-19 @ 13:00)  T(F): , Max: 99.1 (05-10-19 @ 13:00)  HR: 90 (05-11-19 @ 09:47)  BP: 101/46 (05-11-19 @ 09:47)  BP(mean): 47 (05-10-19 @ 17:59)  RR: 19 (05-11-19 @ 09:47)  SpO2: 100% (05-11-19 @ 09:47)  Wt(kg): --    I and O's:    05-09 @ 07:01  -  05-10 @ 07:00  --------------------------------------------------------  IN: 325 mL / OUT: 2000 mL / NET: -1675 mL    05-10 @ 07:01 - 05-11 @ 07:00  --------------------------------------------------------  IN: 1021.8 mL / OUT: 3700 mL / NET: -2678.2 mL          PHYSICAL EXAM:    Constitutional: NAD  HEENT: PERRLA,   Neck: No JVD  Respiratory: CTA B/L  Cardiovascular: S1 and S2  Gastrointestinal: BS+, soft, NT/ND  Extremities: No peripheral edema  Neurological: A/O x 3, no focal deficits  Psychiatric: Normal mood, normal affect  : No Oscar  Skin: No rashes  Access: Not applicable  Back: No CVA tenderness    LABS:                        8.6    11.32 )-----------( 301      ( 11 May 2019 06:25 )             27.0     05-11    139  |  102  |  79<H>  ----------------------------<  106<H>  3.7   |  21<L>  |  2.31<H>    Ca    8.8      11 May 2019 06:25  Phos  3.4     05-11  Mg     2.0     05-11    TPro  7.4  /  Alb  3.80  /  TBili  0.5  /  DBili  x   /  AST  22  /  ALT  28  /  AlkPhos  74  05-11          RADIOLOGY & ADDITIONAL STUDIES:

## 2019-05-11 NOTE — PROGRESS NOTE ADULT - SUBJECTIVE AND OBJECTIVE BOX
24H hour events:   started on milrinone last night 0.25  cont on bumex 0.5  weight down 1kg from yesterday   net negative -2.6 L yesterday with 3.7L UOP  Cr remains stagnant at 2.3  tele sinus pvcs 90s-110s    MEDICATIONS:  aspirin enteric coated 81 milliGRAM(s) Oral daily  buMETAnide Infusion 0.5 mG/Hr IV Continuous <Continuous>  hydrALAZINE 25 milliGRAM(s) Oral three times a day  isosorbide   dinitrate Tablet (ISORDIL) 10 milliGRAM(s) Oral three times a day  metoprolol succinate ER 12.5 milliGRAM(s) Oral daily  milrinone Infusion 0.25 MICROgram(s)/kG/Min IV Continuous <Continuous>  ticagrelor 90 milliGRAM(s) Oral two times a day      ALBUTerol/ipratropium for Nebulization 3 milliLiter(s) Nebulizer every 6 hours  buDESOnide 160 MICROgram(s)/formoterol 4.5 MICROgram(s) Inhaler 2 Puff(s) Inhalation two times a day  montelukast 10 milliGRAM(s) Oral daily    acetaminophen   Tablet .. 650 milliGRAM(s) Oral every 6 hours PRN  ALPRAZolam 0.25 milliGRAM(s) Oral every 12 hours PRN  gabapentin 100 milliGRAM(s) Oral two times a day    docusate sodium 100 milliGRAM(s) Oral two times a day  pantoprazole    Tablet 40 milliGRAM(s) Oral before breakfast  polyethylene glycol 3350 17 Gram(s) Oral two times a day  senna 2 Tablet(s) Oral at bedtime    atorvastatin 40 milliGRAM(s) Oral at bedtime  dextrose 40% Gel 15 Gram(s) Oral once PRN  dextrose 50% Injectable 12.5 Gram(s) IV Push once  dextrose 50% Injectable 25 Gram(s) IV Push once  dextrose 50% Injectable 25 Gram(s) IV Push once  glucagon  Injectable 1 milliGRAM(s) IntraMuscular once PRN  insulin glargine Injectable (LANTUS) 60 Unit(s) SubCutaneous at bedtime  insulin lispro (HumaLOG) corrective regimen sliding scale   SubCutaneous three times a day before meals  insulin lispro (HumaLOG) corrective regimen sliding scale   SubCutaneous at bedtime  insulin lispro Injectable (HumaLOG) 20 Unit(s) SubCutaneous three times a day with meals  levothyroxine 50 MICROGram(s) Oral daily    chlorhexidine 4% Liquid 1 Application(s) Topical <User Schedule>  dextrose 5%. 1000 milliLiter(s) IV Continuous <Continuous>  ergocalciferol 54980 Unit(s) Oral <User Schedule>  influenza   Vaccine 0.5 milliLiter(s) IntraMuscular once  sodium chloride 0.65% Nasal 1 Spray(s) Both Nostrils three times a day      REVIEW OF SYSTEMS:  General: no fatigue/malaise, weight loss/gain.  Skin: no rashes.  Ophthalmologic: no blurred vision, no loss of vision. 	  ENT: no sore throat, rhinorrhea, sinus congestion.  Respiratory: no SOB, cough or wheeze.  Gastrointestinal:  no N/V/D, no melena/hematemesis/hematochezia.  Genitourinary: no dysuria/hesitancy or hematuria.  Musculoskeletal: no myalgias or arthralgias.  Neurological: no changes in vision or hearing, no lightheadedness/dizziness, no syncope/near syncope	  Psychiatric: no unusual stress/anxiety.   Hematology/Lymphatics: no unusual bleeding, bruising and no lymphadenopathy.  Endocrine: no unusual thirst.   All others negative except as stated above and in HPI.    PHYSICAL EXAM:  T(C): 36.8 (05-11-19 @ 08:22), Max: 37.3 (05-10-19 @ 13:00)  HR: 90 (05-11-19 @ 09:47) (83 - 93)  BP: 101/46 (05-11-19 @ 09:47) (85/35 - 110/50)  RR: 19 (05-11-19 @ 09:47) (19 - 30)  SpO2: 100% (05-11-19 @ 09:47) (98% - 100%)  Wt(kg): --  I&O's Summary    10 May 2019 07:01  -  11 May 2019 07:00  --------------------------------------------------------  IN: 1021.8 mL / OUT: 3700 mL / NET: -2678.2 mL        Appearance: Normal	  HEENT:   Normal oral mucosa, PERRL, EOMI	  Lymphatic: No lymphadenopathy  Cardiovascular: Normal S1 S2, No JVD, No murmurs, No edema  Respiratory: Lungs clear to auscultation	  Psychiatry: A & O x 3, Mood & affect appropriate  Gastrointestinal:  Soft, Non-tender, + BS	  Skin: No rashes, No ecchymoses, No cyanosis	  Neurologic: Non-focal  Extremities: Normal range of motion, No clubbing, cyanosis or edema  Vascular: Peripheral pulses palpable 2+ bilaterally        LABS:	 	    CBC Full  -  ( 11 May 2019 06:25 )  WBC Count : 11.32 K/uL  Hemoglobin : 8.6 g/dL  Hematocrit : 27.0 %  Platelet Count - Automated : 301 K/uL  Mean Cell Volume : 88.8 fL  Mean Cell Hemoglobin : 28.3 pg  Mean Cell Hemoglobin Concentration : 31.9 %  Auto Neutrophil # : x  Auto Lymphocyte # : x  Auto Monocyte # : x  Auto Eosinophil # : x  Auto Basophil # : x  Auto Neutrophil % : x  Auto Lymphocyte % : x  Auto Monocyte % : x  Auto Eosinophil % : x  Auto Basophil % : x    05-11    139  |  102  |  79<H>  ----------------------------<  106<H>  3.7   |  21<L>  |  2.31<H>  05-10    138  |  103  |  85<H>  ----------------------------<  62<L>  3.8   |  19<L>  |  2.37<H>    Ca    8.8      11 May 2019 06:25  Ca    8.9      10 May 2019 06:00  Phos  3.4     05-11  Phos  4.9     05-10  Mg     2.0     05-11  Mg     2.2     05-10    TPro  7.4  /  Alb  3.80  /  TBili  0.5  /  DBili  x   /  AST  22  /  ALT  28  /  AlkPhos  74  05-11 24H hour events:   started on milrinone last night 0.25  cont on bumex 0.5  weight down 1kg from yesterday   net negative -2.6 L yesterday with 3.7L UOP  Cr remains stagnant at 2.3  some L shoulder pain    tele sinus pvcs 90s-110s    MEDICATIONS:  aspirin enteric coated 81 milliGRAM(s) Oral daily  buMETAnide Infusion 0.5 mG/Hr IV Continuous <Continuous>  hydrALAZINE 25 milliGRAM(s) Oral three times a day  isosorbide   dinitrate Tablet (ISORDIL) 10 milliGRAM(s) Oral three times a day  metoprolol succinate ER 12.5 milliGRAM(s) Oral daily  milrinone Infusion 0.25 MICROgram(s)/kG/Min IV Continuous <Continuous>  ticagrelor 90 milliGRAM(s) Oral two times a day      ALBUTerol/ipratropium for Nebulization 3 milliLiter(s) Nebulizer every 6 hours  buDESOnide 160 MICROgram(s)/formoterol 4.5 MICROgram(s) Inhaler 2 Puff(s) Inhalation two times a day  montelukast 10 milliGRAM(s) Oral daily    acetaminophen   Tablet .. 650 milliGRAM(s) Oral every 6 hours PRN  ALPRAZolam 0.25 milliGRAM(s) Oral every 12 hours PRN  gabapentin 100 milliGRAM(s) Oral two times a day    docusate sodium 100 milliGRAM(s) Oral two times a day  pantoprazole    Tablet 40 milliGRAM(s) Oral before breakfast  polyethylene glycol 3350 17 Gram(s) Oral two times a day  senna 2 Tablet(s) Oral at bedtime    atorvastatin 40 milliGRAM(s) Oral at bedtime  dextrose 40% Gel 15 Gram(s) Oral once PRN  dextrose 50% Injectable 12.5 Gram(s) IV Push once  dextrose 50% Injectable 25 Gram(s) IV Push once  dextrose 50% Injectable 25 Gram(s) IV Push once  glucagon  Injectable 1 milliGRAM(s) IntraMuscular once PRN  insulin glargine Injectable (LANTUS) 60 Unit(s) SubCutaneous at bedtime  insulin lispro (HumaLOG) corrective regimen sliding scale   SubCutaneous three times a day before meals  insulin lispro (HumaLOG) corrective regimen sliding scale   SubCutaneous at bedtime  insulin lispro Injectable (HumaLOG) 20 Unit(s) SubCutaneous three times a day with meals  levothyroxine 50 MICROGram(s) Oral daily    chlorhexidine 4% Liquid 1 Application(s) Topical <User Schedule>  dextrose 5%. 1000 milliLiter(s) IV Continuous <Continuous>  ergocalciferol 79762 Unit(s) Oral <User Schedule>  influenza   Vaccine 0.5 milliLiter(s) IntraMuscular once  sodium chloride 0.65% Nasal 1 Spray(s) Both Nostrils three times a day      REVIEW OF SYSTEMS:  General: no fatigue/malaise, weight loss/gain.  Skin: no rashes.  Ophthalmologic: no blurred vision, no loss of vision. 	  ENT: no sore throat, rhinorrhea, sinus congestion.  Respiratory: no SOB, cough or wheeze.  Gastrointestinal:  no N/V/D, no melena/hematemesis/hematochezia.  Genitourinary: no dysuria/hesitancy or hematuria.  Musculoskeletal: no myalgias or arthralgias.  Neurological: no changes in vision or hearing, no lightheadedness/dizziness, no syncope/near syncope	  Psychiatric: no unusual stress/anxiety.   Hematology/Lymphatics: no unusual bleeding, bruising and no lymphadenopathy.  Endocrine: no unusual thirst.   All others negative except as stated above and in HPI.    PHYSICAL EXAM:  T(C): 36.8 (05-11-19 @ 08:22), Max: 37.3 (05-10-19 @ 13:00)  HR: 90 (05-11-19 @ 09:47) (83 - 93)  BP: 101/46 (05-11-19 @ 09:47) (85/35 - 110/50)  RR: 19 (05-11-19 @ 09:47) (19 - 30)  SpO2: 100% (05-11-19 @ 09:47) (98% - 100%)  Wt(kg): --  I&O's Summary    10 May 2019 07:01  -  11 May 2019 07:00  --------------------------------------------------------  IN: 1021.8 mL / OUT: 3700 mL / NET: -2678.2 mL        Appearance: Normal	  HEENT:   Normal oral mucosa, PERRL, EOMI	  Lymphatic: No lymphadenopathy  Cardiovascular: Normal S1 S2, +JVD  No murmurs,   Respiratory: difficult to ascultate anteriorally but no crackles appreciated  Psychiatry: A & O x 3, Mood & affect appropriate  Gastrointestinal:  Soft, Non-tender, + BS	  Skin: No rashes, No ecchymoses, No cyanosis	  Neurologic: Non-focal  Extremities: Normal range of motion, No clubbing, cyanosis or edema  Vascular: Peripheral pulses palpable 2+ bilaterally        LABS:	 	    CBC Full  -  ( 11 May 2019 06:25 )  WBC Count : 11.32 K/uL  Hemoglobin : 8.6 g/dL  Hematocrit : 27.0 %  Platelet Count - Automated : 301 K/uL  Mean Cell Volume : 88.8 fL  Mean Cell Hemoglobin : 28.3 pg  Mean Cell Hemoglobin Concentration : 31.9 %  Auto Neutrophil # : x  Auto Lymphocyte # : x  Auto Monocyte # : x  Auto Eosinophil # : x  Auto Basophil # : x  Auto Neutrophil % : x  Auto Lymphocyte % : x  Auto Monocyte % : x  Auto Eosinophil % : x  Auto Basophil % : x    05-11    139  |  102  |  79<H>  ----------------------------<  106<H>  3.7   |  21<L>  |  2.31<H>  05-10    138  |  103  |  85<H>  ----------------------------<  62<L>  3.8   |  19<L>  |  2.37<H>    Ca    8.8      11 May 2019 06:25  Ca    8.9      10 May 2019 06:00  Phos  3.4     05-11  Phos  4.9     05-10  Mg     2.0     05-11  Mg     2.2     05-10    TPro  7.4  /  Alb  3.80  /  TBili  0.5  /  DBili  x   /  AST  22  /  ALT  28  /  AlkPhos  74  05-11

## 2019-05-11 NOTE — PROVIDER CONTACT NOTE (OTHER) - ACTION/TREATMENT ORDERED:
Cardiac enzymes drawn, cardiac monitoring continued, 12 lead ECG obtained, oxygen continued. RASHIDA Bone in to examine patient.

## 2019-05-11 NOTE — PROGRESS NOTE ADULT - ASSESSMENT
72 YO F w/o h/o CAD, s/p CABG Systolic CHF, CKD 3b, who p/w sob and pino.    - Acute on Chronic Systolic CHF Exacerbation, and Severe Mitral Valve Regurgitation:      - c/w diuresis and cards meds      - all consultants management appreciated        - severe systolic dysfunction with severe MVR - pt/family agreeable to mitral valve clip - ?outpt/inpt intervention      - c/w cardiac meds      - PT, strict i/o, tele     - Panic Attack:       - xanax prn        - c/w optimization of co-morbidities    - Symptomatic Anemia:      - with gastrointestinal bleed       - improving h/h s/p prbc transfusion      - monitor h/h, maintain hgb > 7     - Acute Gout Flare:       - improved    - NSTEMI:      - asa/brilinta/statin      - adjust management prn      - tele    - MERVIN on CKD      - stable renal function.  trend renal function.      - optimize comorbidities. Avoid nephrotoxic rx     - DM II with long term complications of hyperglycemia, hypoglcyemia, and nephropathy   - c/w dm rx, as above    - monitor FS closely given hypoglycemia       - complicated by poor nutritional compliance (pt fed food from outside the hospital)       - c/w dm rx      - dm education

## 2019-05-11 NOTE — PROGRESS NOTE ADULT - SUBJECTIVE AND OBJECTIVE BOX
Follow-up Pulm Progress Note    still on two drips.     Medications:  MEDICATIONS  (STANDING):  ALBUTerol/ipratropium for Nebulization 3 milliLiter(s) Nebulizer every 6 hours  aspirin enteric coated 81 milliGRAM(s) Oral daily  atorvastatin 40 milliGRAM(s) Oral at bedtime  buDESOnide 160 MICROgram(s)/formoterol 4.5 MICROgram(s) Inhaler 2 Puff(s) Inhalation two times a day  buMETAnide Infusion 0.5 mG/Hr (2.5 mL/Hr) IV Continuous <Continuous>  chlorhexidine 4% Liquid 1 Application(s) Topical <User Schedule>  dextrose 5%. 1000 milliLiter(s) (50 mL/Hr) IV Continuous <Continuous>  dextrose 50% Injectable 12.5 Gram(s) IV Push once  dextrose 50% Injectable 25 Gram(s) IV Push once  dextrose 50% Injectable 25 Gram(s) IV Push once  docusate sodium 100 milliGRAM(s) Oral two times a day  ergocalciferol 09817 Unit(s) Oral <User Schedule>  gabapentin 100 milliGRAM(s) Oral two times a day  hydrALAZINE 25 milliGRAM(s) Oral three times a day  influenza   Vaccine 0.5 milliLiter(s) IntraMuscular once  insulin glargine Injectable (LANTUS) 60 Unit(s) SubCutaneous at bedtime  insulin lispro (HumaLOG) corrective regimen sliding scale   SubCutaneous three times a day before meals  insulin lispro (HumaLOG) corrective regimen sliding scale   SubCutaneous at bedtime  insulin lispro Injectable (HumaLOG) 20 Unit(s) SubCutaneous three times a day with meals  isosorbide   dinitrate Tablet (ISORDIL) 10 milliGRAM(s) Oral three times a day  levothyroxine 50 MICROGram(s) Oral daily  metoprolol succinate ER 12.5 milliGRAM(s) Oral daily  milrinone Infusion 0.25 MICROgram(s)/kG/Min (4.275 mL/Hr) IV Continuous <Continuous>  montelukast 10 milliGRAM(s) Oral daily  pantoprazole    Tablet 40 milliGRAM(s) Oral before breakfast  polyethylene glycol 3350 17 Gram(s) Oral two times a day  senna 2 Tablet(s) Oral at bedtime  sodium chloride 0.65% Nasal 1 Spray(s) Both Nostrils three times a day  ticagrelor 90 milliGRAM(s) Oral two times a day    MEDICATIONS  (PRN):  acetaminophen   Tablet .. 650 milliGRAM(s) Oral every 6 hours PRN Mild Pain (1 - 3), Moderate Pain (4 - 6), Severe Pain (7 - 10)  ALPRAZolam 0.25 milliGRAM(s) Oral every 12 hours PRN panic attack  dextrose 40% Gel 15 Gram(s) Oral once PRN Blood Glucose LESS THAN 70 milliGRAM(s)/deciliter  glucagon  Injectable 1 milliGRAM(s) IntraMuscular once PRN Glucose LESS THAN 70 milligrams/deciliter    Vital Signs Last 24 Hrs  T(C): 36.8 (11 May 2019 08:22), Max: 37 (10 May 2019 16:04)  T(F): 98.2 (11 May 2019 08:22), Max: 98.6 (10 May 2019 16:04)  HR: 90 (11 May 2019 09:47) (83 - 92)  BP: 101/46 (11 May 2019 09:47) (85/35 - 110/48)  BP(mean): 47 (10 May 2019 17:59) (47 - 61)  RR: 19 (11 May 2019 09:47) (19 - 29)  SpO2: 100% (11 May 2019 09:47) (98% - 100%)    05-10 @ 07:01  -  11 @ 07:00  --------------------------------------------------------  IN: 1021.8 mL / OUT: 3700 mL / NET: -2678.2 mL    LABS:                        8.6    11.32 )-----------( 301      ( 11 May 2019 06:25 )             27.0     05-11    139  |  102  |  79<H>  ----------------------------<  106<H>  3.7   |  21<L>  |  2.31<H>    Ca    8.8      11 May 2019 06:25  Phos  3.4     11  Mg     2.0     11    TPro  7.4  /  Alb  3.80  /  TBili  0.5  /  DBili  x   /  AST  22  /  ALT  28  /  AlkPhos  74  05-11      CARDIAC MARKERS ( 10 May 2019 23:50 )  x     / x     / 82 u/L / 4.40 ng/mL / x      CARDIAC MARKERS ( 10 May 2019 14:30 )  x     / x     / 97 u/L / 5.35 ng/mL / x      CARDIAC MARKERS ( 10 May 2019 09:30 )  x     / x     / 122 u/L / 6.03 ng/mL / x      CARDIAC MARKERS ( 10 May 2019 06:00 )  x     / x     / 102 u/L / x     / x        CAPILLARY BLOOD GLUCOSE      POCT Blood Glucose.: 184 mg/dL (11 May 2019 13:16)      Urinalysis Basic - ( 10 May 2019 09:30 )    Color: LIGHT YELLOW / Appearance: CLEAR / S.010 / pH: 6.0  Gluc: NEGATIVE / Ketone: NEGATIVE  / Bili: NEGATIVE / Urobili: NORMAL   Blood: NEGATIVE / Protein: NEGATIVE / Nitrite: NEGATIVE   Leuk Esterase: NEGATIVE / RBC: x / WBC x   Sq Epi: x / Non Sq Epi: x / Bacteria: x    CULTURES: (if applicable)    Physical Examination:  PULM: Decreased BS at bases  CVS: S1, S2 heard    RADIOLOGY REVIEWED  CXR:     CT chest:    TTE:

## 2019-05-11 NOTE — PROGRESS NOTE ADULT - ATTENDING COMMENTS
5/9: sob secondary to severe MR and chf exacerbation: for surgical evaluation:  5/10: May use bipap if requiried for increasing work of breathing: >? for alonso clip: defer to ccu

## 2019-05-11 NOTE — PROGRESS NOTE ADULT - PROBLEM SELECTOR PLAN 2
chest pain is stable. on dual antiplatelet therapy. management per cardiology   per cardiology some point heart cath  : per cardiology : she is on diuretics!  : stable: cont current medications!  : has severe MR for anjali in AM  : ANJALI reviewed: has severe MR:  5/3: cts evalaution pendin/4: Cont  rx:  : cont rx:  : stable/: has severe MR: defer to primary team for further options: no intervention from pulmonary side:  : CONT diuretics: per cts now: for surgery  5/10: Has severe multivessel disease: cont current management per CCU:

## 2019-05-11 NOTE — PROGRESS NOTE ADULT - SUBJECTIVE AND OBJECTIVE BOX
Patient seen and examined at bedside  transferred to the medical floors, c/o mild chest/epigastric discomfort overnight, unspecified/questionable relationship to food consumption, a/w anxiety  tele nsr, lbbb  Case discussed with medical team    HPI:  73YOF with hx of CAD s/p CABG, hypothyroidism, CKD, CHF, HTN, DM p/w worsening ZEE and sob. Patient usually can walk up a few steps before feeling sob, currently feeling sob after walking from living room to kitchen.  She is using 3 pillows to sleep. No cough, fevers. (+) CP, SS and constant, She experienced more ZEE for last two days.  She has no edema in lower extremety.  Last admission to [facility] was 11/18. (24 Apr 2019 10:21)      PAST MEDICAL & SURGICAL HISTORY:  GERD (gastroesophageal reflux disease)  CAD (coronary artery disease): s/p CABG  Hypothyroidism  Hyperlipidemia  Diabetes mellitus, type 2  S/P CABG (coronary artery bypass graft)      azithromycin (Hives; Pruritus)       MEDICATIONS  (STANDING):  ALBUTerol/ipratropium for Nebulization 3 milliLiter(s) Nebulizer every 6 hours  aspirin enteric coated 81 milliGRAM(s) Oral daily  atorvastatin 40 milliGRAM(s) Oral at bedtime  buDESOnide 160 MICROgram(s)/formoterol 4.5 MICROgram(s) Inhaler 2 Puff(s) Inhalation two times a day  buMETAnide Infusion 0.5 mG/Hr (2.5 mL/Hr) IV Continuous <Continuous>  chlorhexidine 4% Liquid 1 Application(s) Topical <User Schedule>  dextrose 5%. 1000 milliLiter(s) (50 mL/Hr) IV Continuous <Continuous>  dextrose 50% Injectable 12.5 Gram(s) IV Push once  dextrose 50% Injectable 25 Gram(s) IV Push once  dextrose 50% Injectable 25 Gram(s) IV Push once  docusate sodium 100 milliGRAM(s) Oral two times a day  ergocalciferol 30175 Unit(s) Oral <User Schedule>  gabapentin 100 milliGRAM(s) Oral two times a day  hydrALAZINE 25 milliGRAM(s) Oral three times a day  influenza   Vaccine 0.5 milliLiter(s) IntraMuscular once  insulin glargine Injectable (LANTUS) 60 Unit(s) SubCutaneous at bedtime  insulin lispro (HumaLOG) corrective regimen sliding scale   SubCutaneous three times a day before meals  insulin lispro (HumaLOG) corrective regimen sliding scale   SubCutaneous at bedtime  insulin lispro Injectable (HumaLOG) 20 Unit(s) SubCutaneous three times a day with meals  isosorbide   dinitrate Tablet (ISORDIL) 10 milliGRAM(s) Oral three times a day  levothyroxine 50 MICROGram(s) Oral daily  metoprolol succinate ER 12.5 milliGRAM(s) Oral daily  milrinone Infusion 0.25 MICROgram(s)/kG/Min (4.275 mL/Hr) IV Continuous <Continuous>  montelukast 10 milliGRAM(s) Oral daily  pantoprazole    Tablet 40 milliGRAM(s) Oral before breakfast  polyethylene glycol 3350 17 Gram(s) Oral two times a day  senna 2 Tablet(s) Oral at bedtime  sodium chloride 0.65% Nasal 1 Spray(s) Both Nostrils three times a day  ticagrelor 90 milliGRAM(s) Oral two times a day    MEDICATIONS  (PRN):  acetaminophen   Tablet .. 650 milliGRAM(s) Oral every 6 hours PRN Mild Pain (1 - 3), Moderate Pain (4 - 6), Severe Pain (7 - 10)  ALPRAZolam 0.25 milliGRAM(s) Oral every 12 hours PRN panic attack  dextrose 40% Gel 15 Gram(s) Oral once PRN Blood Glucose LESS THAN 70 milliGRAM(s)/deciliter  glucagon  Injectable 1 milliGRAM(s) IntraMuscular once PRN Glucose LESS THAN 70 milligrams/deciliter        REVIEW OF SYSTEMS:  CONSTITUTIONAL: (+) malaise. fatigue. anxiety  EYES: No acute change in vision   ENT:  No tinnitus  NECK: No stiffness  RESPIRATORY: No hemoptysis  CARDIOVASCULAR: zee. chest discomfort  GASTROINTESTINAL: mild intermittent blood on toilet paper. No hematemesis, diarrhea.  GENITOURINARY: No hematuria  NEUROLOGICAL: No headaches  LYMPH Nodes: No enlarged glands  ENDOCRINE: No heat or cold intolerance	    T(C): 36.6 (05-10-19 @ 09:28), Max: 37.2 (05-10-19 @ 00:00)  HR: 83 (05-10-19 @ 10:00) (72 - 92)  BP: 95/42 (05-10-19 @ 10:00) (87/37 - 117/79)  RR: 21 (05-10-19 @ 10:00) (19 - 34)  SpO2: 100% (05-10-19 @ 10:00) (96% - 100%)    PHYSICAL EXAMINATION:   Constitutional: mildly anxious appearing. NAD  HEENT: NC, AT  Neck:  Supple  Respiratory:  Adequate airflow b/l. Not using accessory muscles of respiration.  Cardiovascular: sys murmur, S1 & S2 intact, no R/G, 2+ radial pulses b/l  Gastrointestinal: Soft, NT, ND, normoactive b.s., no organomegaly/RT/rigidity  Extremities: WWP  Neurological:  Alert and awake.  No acute focal motor deficits. Crude sensation intact.     Labs and imaging reviewed    LABS:  Comprehensive Metabolic, Mg + Phosphorus (05.11.19 @ 06:25)    eGFR if : 24 mL/min    eGFR if Non : 21: The units for eGFR are ml/min/1.73m2 (normalized body  surface area). The eGFR is calculated from a serum  creatinine using the CKD-EPI equation. Other variables  required for calculation are race, age and sex. Among  patients with chronic kidney disease (CKD), the eGFR is  useful in determining the stage of disease according to  KDOQI CKD classification. All eGFR results are reported  numerically with the following interpretation.    GFR  (ml/min/1.73 m2)          W/KIDNEY DAMAGE    W/O KIDNEY DMG  ==========================================================  >= 90.......................Stage 1..............Normal  60-89.......................Stage 2...........Decreased GFR  30-59.......................Stage 3..............Stage 3  15-29.......................Stage 4..............Stage 4  < 15........................Stage 5..............Stage 5    Each stage of CKD assumes that the associated GFR level  has been in effect for at least 3 months. Determination of  stages one and two (with eGFR > 59ml/min/m2) requires  estimation of kidney damage for at least 3 months as  defined by structural or functional abnormalities.    Limitations: All estimates of GFR will be less accurate  for patients at extremes of muscle mass (including but  not limited to frail elderly, critically ill, or cancer  patients), those with unusual diets, and those with  conditions associated with reduced secretion or  extrarenal elimination of creatinine. The eGFR equation  is not recommended for use in patients with unstable  creatinine levels. mL/min    Phosphorus Level, Serum: 3.4: Delta: 4.9 on 05/10/  Delta: 4.9 on 05/10/ mg/dL    Sodium, Serum: 139 mmol/L    Potassium, Serum: 3.7 mmol/L    Chloride, Serum: 102 mmol/L    Carbon Dioxide, Serum: 21 mmol/L    Anion Gap, Serum: 16 mmo/L    Blood Urea Nitrogen, Serum: 79 mg/dL    Creatinine, Serum: 2.31 mg/dL    Glucose, Serum: 106 mg/dL    Calcium, Total Serum: 8.8 mg/dL    Protein Total, Serum: 7.4 g/dL    Albumin, Serum: 3.80 g/dL    Bilirubin Total, Serum: 0.5 mg/dL    Alkaline Phosphatase, Serum: 74 u/L    Aspartate Aminotransferase (AST/SGOT): 22 u/L    Alanine Aminotransferase (ALT/SGPT): 28 u/L    Magnesium, Serum: 2.0 mg/dL

## 2019-05-11 NOTE — PROGRESS NOTE ADULT - ASSESSMENT
72 y/o female with pmhx of HTN, DM, CAD s/p CABG, HFrEF (LVEF 25%) severe MR, severe pulm HTN comes in with cough and SOB.   CT chest showed ground glass opacities, patient was started on IV zithromax, developed rash, now on Levaquin She was admitted to telemetry was given a dose of lopressor, patient became hypotensive and went into respiratory distress, patient was then moved to CCU. S/P LHC: TVD with pant LIMA, RHC PA 69/20/41, RA 14, PCWP 27, CO 3.3, CI 2.3 PA sat 39       Acute on chronic systolic heart failure.      Still volume overloaded   Good urine out with Bumex, hold inotropic support for now   Continue  Bumex 0.5mg IV gtt  Not on ACEI/ARB/ARNI due to MERVIN.   Continue hydral  to 25 mg po TID, isordil 10 mg po TID  Toprol on hold   Will uptitrate as B/Ps allow   Strict I/O and daily standing weights.   LV. Severe posterior MR. Will refer for mitral clip eval if pt and family desire, the risks (renal failure, inadeqaute sx improvement) probably outweigh the benefits.  Please divide PRBC, if trandfuse 72 y/o female with pmhx of HTN, DM, CAD s/p CABG, HFrEF (LVEF 25%) severe MR, severe pulm HTN comes in with cough and SOB.   CT chest showed ground glass opacities, patient was started on IV zithromax, developed rash, now on Levaquin She was admitted to telemetry was given a dose of lopressor, patient became hypotensive and went into respiratory distress, patient was then moved to CCU. S/P LHC: TVD with pant LIMA, RHC PA 69/20/41, RA 14, PCWP 27, CO 3.3, CI 2.3 PA sat 39       Acute on chronic systolic heart failure.      Good urine out with Bumex + milrinone though still with JVD, will cont as is for the next 24 hours   Continue  Bumex 0.5mg IV gtt  Not on ACEI/ARB/ARNI due to MERVIN.   Continue hydral  to 25 mg po TID, isordil 10 mg po TID- bp at goal   Toprol on hold   Strict I/O and daily standing weights.   LV. Severe posterior MR. Will refer for mitral clip eval after diuresis if pt and family desire, the risks (renal failure, inadeqaute sx improvement) probably outweigh the benefits.  Please divide PRBC, if trandfuse

## 2019-05-11 NOTE — PROGRESS NOTE ADULT - ASSESSMENT
Assessment  DMT2: 73y Female with DM T2 with hyperglycemia, on basal bolus insulin, blood sugars fluctuating, patient is not eating full meals for few days now..  CAD: on medications, stable, monitored.  Hypothyroidism: On Synthroid 50 mcg po daily, compliant with Synthroid intake, asymptomatic.  HTN: Controlled,  on antihypertensive medications.  SOB: On Tx, oxygen.  Obesity: No strict exercise routines, not on any weight loss program, neither on low calorie diet.          Jillian Banks MD  Cell: [phone]  Office: [phone]

## 2019-05-11 NOTE — PROGRESS NOTE ADULT - PROBLEM SELECTOR PLAN 1
Will decrease Lantus to 45 units at bed time.  Will decrease Humalog to 4 units before each meal in addition to Humalog correction scale coverage.  Suggest to start insulin pen injection teaching, may DC home on insulin.  Patient counseled for compliance with consistent low carb diet.

## 2019-05-11 NOTE — CHART NOTE - NSCHARTNOTEFT_GEN_A_CORE
Nurse reported  at lunch time. Pt is not eating well.    Instructed to give sliding scale only as Blood sugar was 80 at 8:44 AM.

## 2019-05-11 NOTE — CONSULT NOTE ADULT - ASSESSMENT
agree with dr. henriquez that slow gib in ddx. check stool studies, ppi daiily. may do ct to rule out occult malgingnay if continues to drift.

## 2019-05-11 NOTE — PROGRESS NOTE ADULT - ASSESSMENT
73YOF w/o h/o CAD, s/p CABG CHF adm to [facility] for Acute on Chronic systolic and diastolic HF

## 2019-05-11 NOTE — PROGRESS NOTE ADULT - PROBLEM SELECTOR PLAN 1
She is on diuretics: awaiting TRANSFER TO [facility]: RECED EXTRAS DOSES OF BUMEX TODAY  5/10: pt is in adhf: cath noted: being aggressively diuresed: Most of her breathing is secondary to chf: May use bipap for resp support if required:

## 2019-05-11 NOTE — PROGRESS NOTE ADULT - SUBJECTIVE AND OBJECTIVE BOX
Chief complaint  Patient is a 73y old  Female who presents with a chief complaint of sob (11 May 2019 14:09)   Review of systems  Patient in bed, looks comfortable, no fever, no hypoglycemia.    Labs and Fingersticks  CAPILLARY BLOOD GLUCOSE      POCT Blood Glucose.: 236 mg/dL (11 May 2019 17:05)  POCT Blood Glucose.: 184 mg/dL (11 May 2019 13:16)  POCT Blood Glucose.: 80 mg/dL (11 May 2019 08:44)  POCT Blood Glucose.: 271 mg/dL (10 May 2019 22:56)      Anion Gap, Serum: 16 <H> (05-11 @ 06:25)  Anion Gap, Serum: 16 <H> (05-10 @ 06:00)      Calcium, Total Serum: 8.8 (05-11 @ 06:25)  Calcium, Total Serum: 8.9 (05-10 @ 06:00)  Albumin, Serum: 3.80 (05-11 @ 06:25)    Alanine Aminotransferase (ALT/SGPT): 28 (05-11 @ 06:25)  Alkaline Phosphatase, Serum: 74 (05-11 @ 06:25)  Aspartate Aminotransferase (AST/SGOT): 22 (05-11 @ 06:25)        05-11    139  |  102  |  79<H>  ----------------------------<  106<H>  3.7   |  21<L>  |  2.31<H>    Ca    8.8      11 May 2019 06:25  Phos  3.4     05-11  Mg     2.0     05-11    TPro  7.4  /  Alb  3.80  /  TBili  0.5  /  DBili  x   /  AST  22  /  ALT  28  /  AlkPhos  74  05-11                        8.6    11.32 )-----------( 301      ( 11 May 2019 06:25 )             27.0     Medications  MEDICATIONS  (STANDING):  aspirin enteric coated 81 milliGRAM(s) Oral daily  atorvastatin 40 milliGRAM(s) Oral at bedtime  buDESOnide 160 MICROgram(s)/formoterol 4.5 MICROgram(s) Inhaler 2 Puff(s) Inhalation two times a day  buMETAnide Infusion 0.5 mG/Hr (2.5 mL/Hr) IV Continuous <Continuous>  chlorhexidine 4% Liquid 1 Application(s) Topical <User Schedule>  dextrose 5%. 1000 milliLiter(s) (50 mL/Hr) IV Continuous <Continuous>  dextrose 50% Injectable 12.5 Gram(s) IV Push once  dextrose 50% Injectable 25 Gram(s) IV Push once  dextrose 50% Injectable 25 Gram(s) IV Push once  docusate sodium 100 milliGRAM(s) Oral two times a day  ergocalciferol 76938 Unit(s) Oral <User Schedule>  gabapentin 100 milliGRAM(s) Oral two times a day  hydrALAZINE 25 milliGRAM(s) Oral three times a day  influenza   Vaccine 0.5 milliLiter(s) IntraMuscular once  insulin glargine Injectable (LANTUS) 45 Unit(s) SubCutaneous at bedtime  insulin lispro (HumaLOG) corrective regimen sliding scale   SubCutaneous three times a day before meals  insulin lispro (HumaLOG) corrective regimen sliding scale   SubCutaneous at bedtime  insulin lispro Injectable (HumaLOG) 4 Unit(s) SubCutaneous three times a day with meals  isosorbide   dinitrate Tablet (ISORDIL) 10 milliGRAM(s) Oral three times a day  levothyroxine 50 MICROGram(s) Oral daily  metoprolol succinate ER 12.5 milliGRAM(s) Oral daily  milrinone Infusion 0.25 MICROgram(s)/kG/Min (4.275 mL/Hr) IV Continuous <Continuous>  montelukast 10 milliGRAM(s) Oral daily  pantoprazole    Tablet 40 milliGRAM(s) Oral before breakfast  polyethylene glycol 3350 17 Gram(s) Oral two times a day  senna 2 Tablet(s) Oral at bedtime  sodium chloride 0.65% Nasal 1 Spray(s) Both Nostrils three times a day  ticagrelor 90 milliGRAM(s) Oral two times a day      Physical Exam  General: Patient comfortable in bed  Vital Signs Last 12 Hrs  T(F): 98 (05-11-19 @ 16:33), Max: 98.2 (05-11-19 @ 08:22)  HR: 87 (05-11-19 @ 18:00) (86 - 92)  BP: 98/50 (05-11-19 @ 18:00) (96/48 - 101/46)  BP(mean): --  RR: 21 (05-11-19 @ 16:33) (19 - 21)  SpO2: 100% (05-11-19 @ 16:33) (100% - 100%)  Neck: No palpable thyroid nodules.  CVS: S1S2, No murmurs  Respiratory: No wheezing, no crepitations  GI: Abdomen soft, bowel sounds positive  Musculoskeletal:  edema lower extremities.   Skin: No skin rashes, no ecchymosis    Diagnostics    Thyroid Stimulating Hormone, Serum: AM Sched. Collection: 07-May-2019 06:00 (05-06 @ 10:38)  Free Thyroxine, Serum: AM Sched. Collection: 07-May-2019 06:00 (05-06 @ 10:38)

## 2019-05-12 LAB
ANION GAP SERPL CALC-SCNC: 16 MMO/L — HIGH (ref 7–14)
APTT BLD: 36.2 SEC — SIGNIFICANT CHANGE UP (ref 27.5–36.3)
BASE EXCESS BLDV CALC-SCNC: -1.9 MMOL/L — SIGNIFICANT CHANGE UP
BUN SERPL-MCNC: 79 MG/DL — HIGH (ref 7–23)
CALCIUM SERPL-MCNC: 8.1 MG/DL — LOW (ref 8.4–10.5)
CHLORIDE SERPL-SCNC: 99 MMOL/L — SIGNIFICANT CHANGE UP (ref 98–107)
CK MB BLD-MCNC: 3.36 NG/ML — SIGNIFICANT CHANGE UP (ref 1–4.7)
CK SERPL-CCNC: 58 U/L — SIGNIFICANT CHANGE UP (ref 25–170)
CO2 SERPL-SCNC: 19 MMOL/L — LOW (ref 22–31)
CREAT SERPL-MCNC: 2.37 MG/DL — HIGH (ref 0.5–1.3)
GAS PNL BLDV: 134 MMOL/L — LOW (ref 136–146)
GLUCOSE BLDV-MCNC: 201 MG/DL — HIGH (ref 70–99)
GLUCOSE SERPL-MCNC: 201 MG/DL — HIGH (ref 70–99)
HCO3 BLDV-SCNC: 23 MMOL/L — SIGNIFICANT CHANGE UP (ref 20–27)
HCT VFR BLD CALC: 25.2 % — LOW (ref 34.5–45)
HCT VFR BLDV CALC: 26.2 % — LOW (ref 34.5–45)
HGB BLD-MCNC: 8 G/DL — LOW (ref 11.5–15.5)
HGB BLDV-MCNC: 8.4 G/DL — LOW (ref 11.5–15.5)
INR BLD: 1.11 — SIGNIFICANT CHANGE UP (ref 0.88–1.17)
MAGNESIUM SERPL-MCNC: 2.2 MG/DL — SIGNIFICANT CHANGE UP (ref 1.6–2.6)
MCHC RBC-ENTMCNC: 28.1 PG — SIGNIFICANT CHANGE UP (ref 27–34)
MCHC RBC-ENTMCNC: 31.7 % — LOW (ref 32–36)
MCV RBC AUTO: 88.4 FL — SIGNIFICANT CHANGE UP (ref 80–100)
NRBC # FLD: 0.02 K/UL — SIGNIFICANT CHANGE UP (ref 0–0)
PCO2 BLDV: 31 MMHG — LOW (ref 41–51)
PH BLDV: 7.46 PH — HIGH (ref 7.32–7.43)
PHOSPHATE SERPL-MCNC: 3.8 MG/DL — SIGNIFICANT CHANGE UP (ref 2.5–4.5)
PLATELET # BLD AUTO: 269 K/UL — SIGNIFICANT CHANGE UP (ref 150–400)
PMV BLD: 9.1 FL — SIGNIFICANT CHANGE UP (ref 7–13)
PO2 BLDV: 58 MMHG — HIGH (ref 35–40)
POTASSIUM BLDV-SCNC: 3.4 MMOL/L — SIGNIFICANT CHANGE UP (ref 3.4–4.5)
POTASSIUM SERPL-MCNC: 3.5 MMOL/L — SIGNIFICANT CHANGE UP (ref 3.5–5.3)
POTASSIUM SERPL-SCNC: 3.5 MMOL/L — SIGNIFICANT CHANGE UP (ref 3.5–5.3)
PROCALCITONIN SERPL-MCNC: 0.38 NG/ML — HIGH (ref 0.02–0.1)
PROTHROM AB SERPL-ACNC: 12.4 SEC — SIGNIFICANT CHANGE UP (ref 9.8–13.1)
RBC # BLD: 2.85 M/UL — LOW (ref 3.8–5.2)
RBC # FLD: 17.3 % — HIGH (ref 10.3–14.5)
SAO2 % BLDV: 87.6 % — HIGH (ref 60–85)
SODIUM SERPL-SCNC: 134 MMOL/L — LOW (ref 135–145)
TROPONIN T, HIGH SENSITIVITY: 367 NG/L — CRITICAL HIGH (ref ?–14)
WBC # BLD: 11.66 K/UL — HIGH (ref 3.8–10.5)
WBC # FLD AUTO: 11.66 K/UL — HIGH (ref 3.8–10.5)

## 2019-05-12 RX ORDER — METOPROLOL TARTRATE 50 MG
2.5 TABLET ORAL ONCE
Refills: 0 | Status: COMPLETED | OUTPATIENT
Start: 2019-05-12 | End: 2019-05-12

## 2019-05-12 RX ORDER — ALPRAZOLAM 0.25 MG
0.25 TABLET ORAL ONCE
Refills: 0 | Status: DISCONTINUED | OUTPATIENT
Start: 2019-05-12 | End: 2019-05-12

## 2019-05-12 RX ADMIN — Medication 2.5 MILLIGRAM(S): at 02:38

## 2019-05-12 RX ADMIN — Medication 100 MILLIGRAM(S): at 17:28

## 2019-05-12 RX ADMIN — Medication 650 MILLIGRAM(S): at 01:20

## 2019-05-12 RX ADMIN — INSULIN GLARGINE 45 UNIT(S): 100 INJECTION, SOLUTION SUBCUTANEOUS at 21:42

## 2019-05-12 RX ADMIN — MONTELUKAST 10 MILLIGRAM(S): 4 TABLET, CHEWABLE ORAL at 12:24

## 2019-05-12 RX ADMIN — ISOSORBIDE DINITRATE 10 MILLIGRAM(S): 5 TABLET ORAL at 09:03

## 2019-05-12 RX ADMIN — Medication 0.25 MILLIGRAM(S): at 03:26

## 2019-05-12 RX ADMIN — ISOSORBIDE DINITRATE 10 MILLIGRAM(S): 5 TABLET ORAL at 17:28

## 2019-05-12 RX ADMIN — TICAGRELOR 90 MILLIGRAM(S): 90 TABLET ORAL at 17:28

## 2019-05-12 RX ADMIN — Medication 650 MILLIGRAM(S): at 00:20

## 2019-05-12 RX ADMIN — Medication 3: at 12:41

## 2019-05-12 RX ADMIN — ATORVASTATIN CALCIUM 40 MILLIGRAM(S): 80 TABLET, FILM COATED ORAL at 21:41

## 2019-05-12 RX ADMIN — Medication 25 MILLIGRAM(S): at 17:28

## 2019-05-12 RX ADMIN — Medication 50 MICROGRAM(S): at 06:07

## 2019-05-12 RX ADMIN — Medication 4 UNIT(S): at 17:28

## 2019-05-12 RX ADMIN — Medication 1 SPRAY(S): at 06:07

## 2019-05-12 RX ADMIN — Medication 4 UNIT(S): at 09:02

## 2019-05-12 RX ADMIN — SENNA PLUS 2 TABLET(S): 8.6 TABLET ORAL at 21:41

## 2019-05-12 RX ADMIN — TICAGRELOR 90 MILLIGRAM(S): 90 TABLET ORAL at 06:06

## 2019-05-12 RX ADMIN — Medication 3: at 09:02

## 2019-05-12 RX ADMIN — Medication 100 MILLIGRAM(S): at 06:07

## 2019-05-12 RX ADMIN — Medication 1 SPRAY(S): at 21:42

## 2019-05-12 RX ADMIN — Medication 4 UNIT(S): at 12:40

## 2019-05-12 RX ADMIN — Medication 25 MILLIGRAM(S): at 00:20

## 2019-05-12 RX ADMIN — GABAPENTIN 100 MILLIGRAM(S): 400 CAPSULE ORAL at 17:28

## 2019-05-12 RX ADMIN — Medication 5: at 17:29

## 2019-05-12 RX ADMIN — MILRINONE LACTATE 4.28 MICROGRAM(S)/KG/MIN: 1 INJECTION, SOLUTION INTRAVENOUS at 12:26

## 2019-05-12 RX ADMIN — ISOSORBIDE DINITRATE 10 MILLIGRAM(S): 5 TABLET ORAL at 00:20

## 2019-05-12 RX ADMIN — POLYETHYLENE GLYCOL 3350 17 GRAM(S): 17 POWDER, FOR SOLUTION ORAL at 06:07

## 2019-05-12 RX ADMIN — BUDESONIDE AND FORMOTEROL FUMARATE DIHYDRATE 2 PUFF(S): 160; 4.5 AEROSOL RESPIRATORY (INHALATION) at 21:40

## 2019-05-12 RX ADMIN — PANTOPRAZOLE SODIUM 40 MILLIGRAM(S): 20 TABLET, DELAYED RELEASE ORAL at 06:06

## 2019-05-12 RX ADMIN — Medication 81 MILLIGRAM(S): at 12:24

## 2019-05-12 RX ADMIN — Medication 25 MILLIGRAM(S): at 09:03

## 2019-05-12 RX ADMIN — BUDESONIDE AND FORMOTEROL FUMARATE DIHYDRATE 2 PUFF(S): 160; 4.5 AEROSOL RESPIRATORY (INHALATION) at 09:03

## 2019-05-12 RX ADMIN — Medication 1: at 21:42

## 2019-05-12 RX ADMIN — GABAPENTIN 100 MILLIGRAM(S): 400 CAPSULE ORAL at 06:06

## 2019-05-12 NOTE — PROGRESS NOTE ADULT - SUBJECTIVE AND OBJECTIVE BOX
SELVIN CLAIRE:[id number],   73yFemale followed for:  azithromycin (Hives; Pruritus)    PAST MEDICAL & SURGICAL HISTORY:  GERD (gastroesophageal reflux disease)  CAD (coronary artery disease): s/p CABG  Hypothyroidism  Hyperlipidemia  Diabetes mellitus, type 2  S/P CABG (coronary artery bypass graft)    FAMILY HISTORY:  Family history of hypertension (Sibling): sister  Family history of diabetes mellitus (Sibling): brothers/sisters    MEDICATIONS  (STANDING):  aspirin enteric coated 81 milliGRAM(s) Oral daily  atorvastatin 40 milliGRAM(s) Oral at bedtime  buDESOnide 160 MICROgram(s)/formoterol 4.5 MICROgram(s) Inhaler 2 Puff(s) Inhalation two times a day  buMETAnide Infusion 0.5 mG/Hr (2.5 mL/Hr) IV Continuous <Continuous>  chlorhexidine 4% Liquid 1 Application(s) Topical <User Schedule>  dextrose 5%. 1000 milliLiter(s) (50 mL/Hr) IV Continuous <Continuous>  dextrose 50% Injectable 12.5 Gram(s) IV Push once  dextrose 50% Injectable 25 Gram(s) IV Push once  dextrose 50% Injectable 25 Gram(s) IV Push once  docusate sodium 100 milliGRAM(s) Oral two times a day  ergocalciferol 20076 Unit(s) Oral <User Schedule>  gabapentin 100 milliGRAM(s) Oral two times a day  hydrALAZINE 25 milliGRAM(s) Oral three times a day  influenza   Vaccine 0.5 milliLiter(s) IntraMuscular once  insulin glargine Injectable (LANTUS) 45 Unit(s) SubCutaneous at bedtime  insulin lispro (HumaLOG) corrective regimen sliding scale   SubCutaneous three times a day before meals  insulin lispro (HumaLOG) corrective regimen sliding scale   SubCutaneous at bedtime  insulin lispro Injectable (HumaLOG) 4 Unit(s) SubCutaneous three times a day with meals  isosorbide   dinitrate Tablet (ISORDIL) 10 milliGRAM(s) Oral three times a day  levothyroxine 50 MICROGram(s) Oral daily  metoprolol succinate ER 12.5 milliGRAM(s) Oral daily  milrinone Infusion 0.25 MICROgram(s)/kG/Min (4.275 mL/Hr) IV Continuous <Continuous>  montelukast 10 milliGRAM(s) Oral daily  pantoprazole    Tablet 40 milliGRAM(s) Oral before breakfast  polyethylene glycol 3350 17 Gram(s) Oral two times a day  senna 2 Tablet(s) Oral at bedtime  sodium chloride 0.65% Nasal 1 Spray(s) Both Nostrils three times a day  ticagrelor 90 milliGRAM(s) Oral two times a day    MEDICATIONS  (PRN):  acetaminophen   Tablet .. 650 milliGRAM(s) Oral every 6 hours PRN Mild Pain (1 - 3), Moderate Pain (4 - 6), Severe Pain (7 - 10)  ALPRAZolam 0.25 milliGRAM(s) Oral every 12 hours PRN panic attack  dextrose 40% Gel 15 Gram(s) Oral once PRN Blood Glucose LESS THAN 70 milliGRAM(s)/deciliter  glucagon  Injectable 1 milliGRAM(s) IntraMuscular once PRN Glucose LESS THAN 70 milligrams/deciliter      Vital Signs Last 24 Hrs  T(C): 36.7 (12 May 2019 08:48), Max: 36.7 (11 May 2019 16:33)  T(F): 98 (12 May 2019 08:48), Max: 98 (11 May 2019 16:33)  HR: 86 (12 May 2019 08:48) (80 - 163)  BP: 98/56 (12 May 2019 08:48) (91/42 - 103/57)  BP(mean): --  RR: 19 (12 May 2019 08:48) (19 - 24)  SpO2: 100% (12 May 2019 08:48) (100% - 100%)  nc/at  s1s2  cta  soft, nt, nd no guarding or rebound  no c/c/e    CBC Full  -  ( 12 May 2019 03:00 )  WBC Count : 11.66 K/uL  RBC Count : 2.85 M/uL  Hemoglobin : 8.0 g/dL  Hematocrit : 25.2 %  Platelet Count - Automated : 269 K/uL  Mean Cell Volume : 88.4 fL  Mean Cell Hemoglobin : 28.1 pg  Mean Cell Hemoglobin Concentration : 31.7 %  Auto Neutrophil # : x  Auto Lymphocyte # : x  Auto Monocyte # : x  Auto Eosinophil # : x  Auto Basophil # : x  Auto Neutrophil % : x  Auto Lymphocyte % : x  Auto Monocyte % : x  Auto Eosinophil % : x  Auto Basophil % : x    05-12    134<L>  |  99  |  79<H>  ----------------------------<  201<H>  3.5   |  19<L>  |  2.37<H>    Ca    8.1<L>      12 May 2019 03:00  Phos  3.8     05-12  Mg     2.2     05-12    TPro  7.4  /  Alb  3.80  /  TBili  0.5  /  DBili  x   /  AST  22  /  ALT  28  /  AlkPhos  74  05-11    PT/INR - ( 12 May 2019 03:00 )   PT: 12.4 SEC;   INR: 1.11          PTT - ( 12 May 2019 03:00 )  PTT:36.2 SEC

## 2019-05-12 NOTE — PROGRESS NOTE ADULT - SUBJECTIVE AND OBJECTIVE BOX
Chief complaint  Patient is a 73y old  Female who presents with a chief complaint of sob (12 May 2019 10:13)   Review of systems  Patient in bed, looks comfortable, no fever, no hypoglycemia.    Labs and Fingersticks  CAPILLARY BLOOD GLUCOSE      POCT Blood Glucose.: 263 mg/dL (12 May 2019 12:40)  POCT Blood Glucose.: 251 mg/dL (12 May 2019 08:35)  POCT Blood Glucose.: 213 mg/dL (12 May 2019 02:45)  POCT Blood Glucose.: 253 mg/dL (11 May 2019 23:01)  POCT Blood Glucose.: 236 mg/dL (11 May 2019 17:05)      Anion Gap, Serum: 16 <H> (05-12 @ 03:00)  Anion Gap, Serum: 16 <H> (05-11 @ 06:25)      Calcium, Total Serum: 8.1 <L> (05-12 @ 03:00)  Calcium, Total Serum: 8.8 (05-11 @ 06:25)  Albumin, Serum: 3.80 (05-11 @ 06:25)    Alanine Aminotransferase (ALT/SGPT): 28 (05-11 @ 06:25)  Alkaline Phosphatase, Serum: 74 (05-11 @ 06:25)  Aspartate Aminotransferase (AST/SGOT): 22 (05-11 @ 06:25)        05-12    134<L>  |  99  |  79<H>  ----------------------------<  201<H>  3.5   |  19<L>  |  2.37<H>    Ca    8.1<L>      12 May 2019 03:00  Phos  3.8     05-12  Mg     2.2     05-12    TPro  7.4  /  Alb  3.80  /  TBili  0.5  /  DBili  x   /  AST  22  /  ALT  28  /  AlkPhos  74  05-11                        8.0    11.66 )-----------( 269      ( 12 May 2019 03:00 )             25.2     Medications  MEDICATIONS  (STANDING):  aspirin enteric coated 81 milliGRAM(s) Oral daily  atorvastatin 40 milliGRAM(s) Oral at bedtime  buDESOnide 160 MICROgram(s)/formoterol 4.5 MICROgram(s) Inhaler 2 Puff(s) Inhalation two times a day  buMETAnide Infusion 0.5 mG/Hr (2.5 mL/Hr) IV Continuous <Continuous>  chlorhexidine 4% Liquid 1 Application(s) Topical <User Schedule>  dextrose 5%. 1000 milliLiter(s) (50 mL/Hr) IV Continuous <Continuous>  dextrose 50% Injectable 12.5 Gram(s) IV Push once  dextrose 50% Injectable 25 Gram(s) IV Push once  dextrose 50% Injectable 25 Gram(s) IV Push once  docusate sodium 100 milliGRAM(s) Oral two times a day  ergocalciferol 22310 Unit(s) Oral <User Schedule>  gabapentin 100 milliGRAM(s) Oral two times a day  hydrALAZINE 25 milliGRAM(s) Oral three times a day  influenza   Vaccine 0.5 milliLiter(s) IntraMuscular once  insulin glargine Injectable (LANTUS) 45 Unit(s) SubCutaneous at bedtime  insulin lispro (HumaLOG) corrective regimen sliding scale   SubCutaneous three times a day before meals  insulin lispro (HumaLOG) corrective regimen sliding scale   SubCutaneous at bedtime  insulin lispro Injectable (HumaLOG) 4 Unit(s) SubCutaneous three times a day with meals  isosorbide   dinitrate Tablet (ISORDIL) 10 milliGRAM(s) Oral three times a day  levothyroxine 50 MICROGram(s) Oral daily  metoprolol succinate ER 12.5 milliGRAM(s) Oral daily  milrinone Infusion 0.25 MICROgram(s)/kG/Min (4.275 mL/Hr) IV Continuous <Continuous>  montelukast 10 milliGRAM(s) Oral daily  pantoprazole    Tablet 40 milliGRAM(s) Oral before breakfast  polyethylene glycol 3350 17 Gram(s) Oral two times a day  senna 2 Tablet(s) Oral at bedtime  sodium chloride 0.65% Nasal 1 Spray(s) Both Nostrils three times a day  ticagrelor 90 milliGRAM(s) Oral two times a day      Physical Exam  General: Patient comfortable in bed  Vital Signs Last 12 Hrs  T(F): 98 (05-12-19 @ 12:22), Max: 98 (05-12-19 @ 08:48)  HR: 82 (05-12-19 @ 12:22) (80 - 86)  BP: 97/48 (05-12-19 @ 12:22) (94/49 - 98/56)  BP(mean): --  RR: 20 (05-12-19 @ 12:22) (19 - 20)  SpO2: 100% (05-12-19 @ 12:22) (100% - 100%)  Neck: No palpable thyroid nodules.  CVS: S1S2, No murmurs  Respiratory: No wheezing, no crepitations  GI: Abdomen soft, bowel sounds positive  Musculoskeletal:  edema lower extremities.   Skin: No skin rashes, no ecchymosis    Diagnostics    Thyroid Stimulating Hormone, Serum: AM Sched. Collection: 07-May-2019 06:00 (05-06 @ 10:38)  Free Thyroxine, Serum: AM Sched. Collection: 07-May-2019 06:00 (05-06 @ 10:38)

## 2019-05-12 NOTE — CHART NOTE - NSCHARTNOTEFT_GEN_A_CORE
RRT called this evening at approximately 2:50am for SVT and hypotension. In brief, this is a 73 year-old F with PMH HTN, CAD, HFrEF, severe MR, and severe pulm hypertension who presented to the hospital with cough and shortness of breath. On arrival, patient appeared tachypneic, heart monitor in place showing SVT with rate of 150. Confirmed on 12-lead EKG. Vagal maneuver performed with conversion to sinus rhythm. Patient's blood pressure, repeated 80s/50s with MAP of 62. Patient received 150cc of fluid with increase in MAP to 66. Given patient's history of HFrEF, fluids stopped at this time. Ordered full set of labs including VBG with lactate, coags, troponin, CBC, and BMP. Patient afebrile with temperature of 97.4. Given patient's return to sinus rhythm (verified with repeat EKG), rapid response ended.    To do:  -Follow up cardiac enzymes, continue telemetry monitoring  -Follow up further cardiology recommendations    Discussed with RRT team, RN, and PA at bedside.    Kevin Rader, PGY-3  MAR  [id number]

## 2019-05-12 NOTE — PROGRESS NOTE ADULT - ASSESSMENT
1.	CHF decompensation, on IV Bumex.  2.	MERVIN, improved.  3.	CKD, at baseline.    PLAN:   1.	Continue Bumex.  2.	Follow up BMP.  3.	K supplementation as needed.

## 2019-05-12 NOTE — PROVIDER CONTACT NOTE (OTHER) - ASSESSMENT
Pt alert and oriented
BP 91/61. Pt tachypneic and complains of palpitations. Anxious.
EKG done  vs b/p 91/ 52 sat 100 
See flowsheet for vital signs. Vital signs stable.
pt weak; no other symptoms

## 2019-05-12 NOTE — PROVIDER CONTACT NOTE (OTHER) - SITUATION
Pts finger stick at 11:30 was 47 after being NPO  awaiting LV
Patient complained of 10/10 constant chest pressure. Patient was resting in bed when she began to feel the chest pressure.
Pt symptomatic SVT 160s on tele monitor; confirmed with 12 lead EKG.
patient on tele showed SVT  lasted 4mins
pt feeling weak, FS checked and 61 was the result

## 2019-05-12 NOTE — PROGRESS NOTE ADULT - SUBJECTIVE AND OBJECTIVE BOX
Patient seen and examined at bedside  overnight rrt called for symptomatic svt, improved s/p vagal maneuvers  after the rrt, tele in sinus rhythm 80's  case discussed with overnight medical team    HPI:  73YOF with hx of CAD s/p CABG, hypothyroidism, CKD, CHF, HTN, DM p/w worsening ZEE and sob. Patient usually can walk up a few steps before feeling sob, currently feeling sob after walking from living room to kitchen.  She is using 3 pillows to sleep. No cough, fevers. (+) CP, SS and constant, She experienced more ZEE for last two days.  She has no edema in lower extremety.  Last admission to [facility] was 11/18. (24 Apr 2019 10:21)      PAST MEDICAL & SURGICAL HISTORY:  GERD (gastroesophageal reflux disease)  CAD (coronary artery disease): s/p CABG  Hypothyroidism  Hyperlipidemia  Diabetes mellitus, type 2  S/P CABG (coronary artery bypass graft)      azithromycin (Hives; Pruritus)       MEDICATIONS  (STANDING):  aspirin enteric coated 81 milliGRAM(s) Oral daily  atorvastatin 40 milliGRAM(s) Oral at bedtime  buDESOnide 160 MICROgram(s)/formoterol 4.5 MICROgram(s) Inhaler 2 Puff(s) Inhalation two times a day  buMETAnide Infusion 0.5 mG/Hr (2.5 mL/Hr) IV Continuous <Continuous>  chlorhexidine 4% Liquid 1 Application(s) Topical <User Schedule>  dextrose 5%. 1000 milliLiter(s) (50 mL/Hr) IV Continuous <Continuous>  dextrose 50% Injectable 12.5 Gram(s) IV Push once  dextrose 50% Injectable 25 Gram(s) IV Push once  dextrose 50% Injectable 25 Gram(s) IV Push once  docusate sodium 100 milliGRAM(s) Oral two times a day  ergocalciferol 88644 Unit(s) Oral <User Schedule>  gabapentin 100 milliGRAM(s) Oral two times a day  hydrALAZINE 25 milliGRAM(s) Oral three times a day  influenza   Vaccine 0.5 milliLiter(s) IntraMuscular once  insulin glargine Injectable (LANTUS) 45 Unit(s) SubCutaneous at bedtime  insulin lispro (HumaLOG) corrective regimen sliding scale   SubCutaneous three times a day before meals  insulin lispro (HumaLOG) corrective regimen sliding scale   SubCutaneous at bedtime  insulin lispro Injectable (HumaLOG) 4 Unit(s) SubCutaneous three times a day with meals  isosorbide   dinitrate Tablet (ISORDIL) 10 milliGRAM(s) Oral three times a day  levothyroxine 50 MICROGram(s) Oral daily  metoprolol succinate ER 12.5 milliGRAM(s) Oral daily  milrinone Infusion 0.25 MICROgram(s)/kG/Min (4.275 mL/Hr) IV Continuous <Continuous>  montelukast 10 milliGRAM(s) Oral daily  pantoprazole    Tablet 40 milliGRAM(s) Oral before breakfast  polyethylene glycol 3350 17 Gram(s) Oral two times a day  senna 2 Tablet(s) Oral at bedtime  sodium chloride 0.65% Nasal 1 Spray(s) Both Nostrils three times a day  ticagrelor 90 milliGRAM(s) Oral two times a day    MEDICATIONS  (PRN):  acetaminophen   Tablet .. 650 milliGRAM(s) Oral every 6 hours PRN Mild Pain (1 - 3), Moderate Pain (4 - 6), Severe Pain (7 - 10)  ALPRAZolam 0.25 milliGRAM(s) Oral every 12 hours PRN panic attack  dextrose 40% Gel 15 Gram(s) Oral once PRN Blood Glucose LESS THAN 70 milliGRAM(s)/deciliter  glucagon  Injectable 1 milliGRAM(s) IntraMuscular once PRN Glucose LESS THAN 70 milligrams/deciliter      REVIEW OF SYSTEMS:  CONSTITUTIONAL: (+) malaise.   EYES: No acute change in vision   ENT:  No tinnitus  NECK: No stiffness  RESPIRATORY: zee. sob. No hemoptysis  CARDIOVASCULAR: chest discomfort  GASTROINTESTINAL: No hematemesis, diarrhea, melena, or hematochezia.  GENITOURINARY: No hematuria  NEUROLOGICAL: No headaches  LYMPH Nodes: No enlarged glands  ENDOCRINE: No heat or cold intolerance	    T(C): 36.7 (05-12-19 @ 08:48), Max: 36.7 (05-11-19 @ 16:33)  HR: 86 (05-12-19 @ 08:48) (80 - 163)  BP: 98/56 (05-12-19 @ 08:48) (91/42 - 103/57)  RR: 19 (05-12-19 @ 08:48) (19 - 24)  SpO2: 100% (05-12-19 @ 08:48) (100% - 100%)    PHYSICAL EXAMINATION:   Constitutional: mildly ill appearing, NAD  HEENT: NC, AT  Neck:  Supple  Respiratory:  mild tachypnea. Adequate airflow b/l. Not using accessory muscles of respiration.  Cardiovascular:  sys murmur, S1 & S2 intact, no R/G, 2+ radial pulses b/l  Gastrointestinal: Soft, NT, ND, normoactive b.s., no organomegaly/RT/rigidity  Extremities: WWP  Neurological:  Alert and awake.  No acute focal motor deficits. Crude sensation intact.     Labs and imaging reviewed    LABS:                        8.0    11.66 )-----------( 269      ( 12 May 2019 03:00 )             25.2     05-12    134<L>  |  99  |  79<H>  ----------------------------<  201<H>  3.5   |  19<L>  |  2.37<H>    Ca    8.1<L>      12 May 2019 03:00  Phos  3.8     05-12  Mg     2.2     05-12    TPro  7.4  /  Alb  3.80  /  TBili  0.5  /  DBili  x   /  AST  22  /  ALT  28  /  AlkPhos  74  05-11    CARDIAC MARKERS ( 12 May 2019 03:00 )  x     / x     / 58 u/L / 3.36 ng/mL / x      CARDIAC MARKERS ( 10 May 2019 23:50 )  x     / x     / 82 u/L / 4.40 ng/mL / x      CARDIAC MARKERS ( 10 May 2019 14:30 )  x     / x     / 97 u/L / 5.35 ng/mL / x          PT/INR - ( 12 May 2019 03:00 )   PT: 12.4 SEC;   INR: 1.11          PTT - ( 12 May 2019 03:00 )  PTT:36.2 SEC    CAPILLARY BLOOD GLUCOSE      POCT Blood Glucose.: 251 mg/dL (12 May 2019 08:35)  POCT Blood Glucose.: 213 mg/dL (12 May 2019 02:45)  POCT Blood Glucose.: 253 mg/dL (11 May 2019 23:01)  POCT Blood Glucose.: 236 mg/dL (11 May 2019 17:05)  POCT Blood Glucose.: 184 mg/dL (11 May 2019 13:16)        LIVER FUNCTIONS - ( 11 May 2019 06:25 )  Alb: 3.80 g/dL / Pro: 7.4 g/dL / ALK PHOS: 74 u/L / ALT: 28 u/L / AST: 22 u/L / GGT: x               RADIOLOGY & ADDITIONAL STUDIES:

## 2019-05-12 NOTE — CHART NOTE - NSCHARTNOTEFT_GEN_A_CORE
Informed by RN that patient went into symptomatic SVT to 160's confirmed on EKG. Her systolic BP was in the 90's upon arrival while on Milrinone and Bumex drip. Metoprolol 2.5mg IVP was given. BP drop to systolic in the 70's. RRT was called.   Patient broke with carotid sinus massage. 150cc bolus was given with improvement in BP, now back to baseline.   Vital Signs Last 24 Hrs  T(C): 36.4 (11 May 2019 21:12), Max: 36.8 (11 May 2019 08:22)  T(F): 97.5 (11 May 2019 21:12), Max: 98.2 (11 May 2019 08:22)  HR: 92 (12 May 2019 00:20) (86 - 92)  BP: 100/64 (12 May 2019 00:20) (96/48 - 103/57)  BP(mean): --  RR: 20 (11 May 2019 21:12) (19 - 21)  SpO2: 100% (11 May 2019 21:12) (99% - 100%)    Will continue to monitor.

## 2019-05-12 NOTE — PROVIDER CONTACT NOTE (OTHER) - RECOMMENDATIONS
Report given to Anurag 1
Pt given apple juice then FS 15min later FS 83 repeat apple juice  pt received lunch at that time.
Come to examine patient.
Metoprolol ER 25mg   po given
Pt encouraged to bare down and cough. Lopressor IVP. IV fluids. Tele PA to assess at patient bedside.
hypoglycemia protocol

## 2019-05-12 NOTE — CONSULT NOTE ADULT - ASSESSMENT
# SIRS ( tachycardia + Tachypnea + Hypotension)  # s/p RRT due to SVT and Hypotension    would recommend:    1. Obtain cultures and procalcitonin level  2. Obtain CT chest  3. Monitor WBC count  4. Management of SVT and PUlm edema as per Primary      will follow the patient with you and make further recommendation based on the clinical course and Lab results  Thank you for the opportunity to participate in Ms. CLAIRE's care A 73 year-old Female  with HTN, CAD, HFrEF, severe MR, and severe pulm hypertension who presented to the hospital with cough and shortness of breath on 4/25/19. On arrival, patient appeared tachypneic, found to have SVT with rate of 150. The Vagal maneuver performed with conversion to sinus rhythm.  She has admitted for acute decompensated CHF, managed by Cardiology team , was in CCU and now transferred to telemetry floor. She was also evaluated by ID, Dr. Roman. for pneumonia. Now the ID consult requested for second opinion for persistent Leukocytosis. The patient has RRT this morning due to Hypotension and SVT. She has no fever or chills BUT c/o chest heaviness and tightness.       # SIRS ( tachycardia + Tachypnea + Hypotension)  # s/p RRT due to SVT and Hypotension  # Acute decompensated CHF -  Prolonged hospitalization, TTE with severe LV dysfunction, severe MR and PH.  And LV shows ischemic MR.  For MR clip referral    would recommend:    1. Obtain cultures and procalcitonin level  2. Obtain CT chest  3. Monitor WBC count  4. Management of SVT,  decompensated CHF, and Pulm edema as per Primary    d/w Dr. Velarde, patient and Family at the bed side    will follow the patient with you and make further recommendation based on the clinical course and Lab results  Thank you for the opportunity to participate in Ms. CLAIRE's care

## 2019-05-12 NOTE — PROVIDER CONTACT NOTE (OTHER) - ACTION/TREATMENT ORDERED:
Tele PA at bedside to assess. 2.5mg Lopressor IVP given. RRT called for SVT with hypotension. Pt currently in NAD and NSR on monitor; confirmed with 12 lead EKG. Continue tele monitoring.

## 2019-05-12 NOTE — PROGRESS NOTE ADULT - SUBJECTIVE AND OBJECTIVE BOX
SELVIN CLAIRE  73y  Patient is a 73y old  Female who presents with a chief complaint of sob (12 May 2019 17:22)    HPI:  Seen and examined. no new complaints. Still on IV Bumex but renal function remains stable.    HEALTH ISSUES - PROBLEM Dx:  NSVT (nonsustained ventricular tachycardia): NSVT (nonsustained ventricular tachycardia)  PNA (pneumonia): PNA (pneumonia)  Prophylactic measure: Prophylactic measure  Hypothyroid: Hypothyroid  Acute kidney injury superimposed on CKD: Acute kidney injury superimposed on CKD  Type 2 diabetes mellitus: Type 2 diabetes mellitus  Troponin level elevated: Troponin level elevated  Acute on chronic systolic heart failure: Acute on chronic systolic heart failure  CKD (chronic kidney disease): CKD (chronic kidney disease)  GERD (gastroesophageal reflux disease): GERD (gastroesophageal reflux disease)  Hypothyroidism: Hypothyroidism  Need for prophylactic measure: Need for prophylactic measure  Diabetes mellitus, type 2: Diabetes mellitus, type 2  Hyperlipidemia: Hyperlipidemia  CAD (coronary artery disease): CAD (coronary artery disease)  CHF exacerbation: CHF exacerbation        MEDICATIONS  (STANDING):  aspirin enteric coated 81 milliGRAM(s) Oral daily  atorvastatin 40 milliGRAM(s) Oral at bedtime  buDESOnide 160 MICROgram(s)/formoterol 4.5 MICROgram(s) Inhaler 2 Puff(s) Inhalation two times a day  buMETAnide Infusion 0.5 mG/Hr (2.5 mL/Hr) IV Continuous <Continuous>  chlorhexidine 4% Liquid 1 Application(s) Topical <User Schedule>  dextrose 5%. 1000 milliLiter(s) (50 mL/Hr) IV Continuous <Continuous>  dextrose 50% Injectable 12.5 Gram(s) IV Push once  dextrose 50% Injectable 25 Gram(s) IV Push once  dextrose 50% Injectable 25 Gram(s) IV Push once  docusate sodium 100 milliGRAM(s) Oral two times a day  ergocalciferol 58531 Unit(s) Oral <User Schedule>  gabapentin 100 milliGRAM(s) Oral two times a day  hydrALAZINE 25 milliGRAM(s) Oral three times a day  influenza   Vaccine 0.5 milliLiter(s) IntraMuscular once  insulin glargine Injectable (LANTUS) 45 Unit(s) SubCutaneous at bedtime  insulin lispro (HumaLOG) corrective regimen sliding scale   SubCutaneous three times a day before meals  insulin lispro (HumaLOG) corrective regimen sliding scale   SubCutaneous at bedtime  insulin lispro Injectable (HumaLOG) 4 Unit(s) SubCutaneous three times a day with meals  isosorbide   dinitrate Tablet (ISORDIL) 10 milliGRAM(s) Oral three times a day  levothyroxine 50 MICROGram(s) Oral daily  metoprolol succinate ER 12.5 milliGRAM(s) Oral daily  milrinone Infusion 0.25 MICROgram(s)/kG/Min (4.275 mL/Hr) IV Continuous <Continuous>  montelukast 10 milliGRAM(s) Oral daily  pantoprazole    Tablet 40 milliGRAM(s) Oral before breakfast  polyethylene glycol 3350 17 Gram(s) Oral two times a day  senna 2 Tablet(s) Oral at bedtime  sodium chloride 0.65% Nasal 1 Spray(s) Both Nostrils three times a day  ticagrelor 90 milliGRAM(s) Oral two times a day    MEDICATIONS  (PRN):  acetaminophen   Tablet .. 650 milliGRAM(s) Oral every 6 hours PRN Mild Pain (1 - 3), Moderate Pain (4 - 6), Severe Pain (7 - 10)  ALPRAZolam 0.25 milliGRAM(s) Oral every 12 hours PRN panic attack  dextrose 40% Gel 15 Gram(s) Oral once PRN Blood Glucose LESS THAN 70 milliGRAM(s)/deciliter  glucagon  Injectable 1 milliGRAM(s) IntraMuscular once PRN Glucose LESS THAN 70 milligrams/deciliter    Vital Signs Last 24 Hrs  T(C): 36.9 (12 May 2019 16:54), Max: 36.9 (12 May 2019 16:54)  T(F): 98.4 (12 May 2019 16:54), Max: 98.4 (12 May 2019 16:54)  HR: 86 (12 May 2019 16:54) (80 - 163)  BP: 97/49 (12 May 2019 16:54) (91/42 - 103/57)  BP(mean): --  RR: 22 (12 May 2019 16:54) (19 - 24)  SpO2: 100% (12 May 2019 16:54) (100% - 100%)  Daily     Daily Weight in k.1 (12 May 2019 05:49)    PHYSICAL EXAM:  Constitutional:  She appears comfortable and not distressed. Not diaphoretic.    Neck:  The thyroid is normal. Trachea is midline.     Respiratory: The lungs are clear to auscultation. No dullness and expansion is normal.    Cardiovascular: S1 and S2 are normal. No mummurs, rubs or gallops are present.    Gastrointestinal: The abdomen is soft. No tenderness is present. No masses are present. Bowel sounds are normal.    Genitourinary: The bladder is not distended. No CVA tenderness is present.    Extremities: Trace edema is noted. No deformities are present.    Neurological:  Tone, power and sensation are normal.                             8.0    11.66 )-----------( 269      ( 12 May 2019 03:00 )             25.2     05-12    134<L>  |  99  |  79<H>  ----------------------------<  201<H>  3.5   |  19<L>  |  2.37<H>    Ca    8.1<L>      12 May 2019 03:00  Phos  3.8     05-12  Mg     2.2     -12    TPro  7.4  /  Alb  3.80  /  TBili  0.5  /  DBili  x   /  AST  22  /  ALT  28  /  AlkPhos  74  05-11

## 2019-05-12 NOTE — PROGRESS NOTE ADULT - SUBJECTIVE AND OBJECTIVE BOX
24H hour events:   overnight with svt up to 160 for 30 min, spontaneously broke  pt resting, laying flat, no complaints    tele:   MEDICATIONS:  aspirin enteric coated 81 milliGRAM(s) Oral daily  buMETAnide Infusion 0.5 mG/Hr IV Continuous <Continuous>  hydrALAZINE 25 milliGRAM(s) Oral three times a day  isosorbide   dinitrate Tablet (ISORDIL) 10 milliGRAM(s) Oral three times a day  metoprolol succinate ER 12.5 milliGRAM(s) Oral daily  milrinone Infusion 0.25 MICROgram(s)/kG/Min IV Continuous <Continuous>  ticagrelor 90 milliGRAM(s) Oral two times a day      buDESOnide 160 MICROgram(s)/formoterol 4.5 MICROgram(s) Inhaler 2 Puff(s) Inhalation two times a day  montelukast 10 milliGRAM(s) Oral daily    acetaminophen   Tablet .. 650 milliGRAM(s) Oral every 6 hours PRN  ALPRAZolam 0.25 milliGRAM(s) Oral every 12 hours PRN  gabapentin 100 milliGRAM(s) Oral two times a day    docusate sodium 100 milliGRAM(s) Oral two times a day  pantoprazole    Tablet 40 milliGRAM(s) Oral before breakfast  polyethylene glycol 3350 17 Gram(s) Oral two times a day  senna 2 Tablet(s) Oral at bedtime    atorvastatin 40 milliGRAM(s) Oral at bedtime  dextrose 40% Gel 15 Gram(s) Oral once PRN  dextrose 50% Injectable 12.5 Gram(s) IV Push once  dextrose 50% Injectable 25 Gram(s) IV Push once  dextrose 50% Injectable 25 Gram(s) IV Push once  glucagon  Injectable 1 milliGRAM(s) IntraMuscular once PRN  insulin glargine Injectable (LANTUS) 45 Unit(s) SubCutaneous at bedtime  insulin lispro (HumaLOG) corrective regimen sliding scale   SubCutaneous three times a day before meals  insulin lispro (HumaLOG) corrective regimen sliding scale   SubCutaneous at bedtime  insulin lispro Injectable (HumaLOG) 4 Unit(s) SubCutaneous three times a day with meals  levothyroxine 50 MICROGram(s) Oral daily    chlorhexidine 4% Liquid 1 Application(s) Topical <User Schedule>  dextrose 5%. 1000 milliLiter(s) IV Continuous <Continuous>  ergocalciferol 38793 Unit(s) Oral <User Schedule>  influenza   Vaccine 0.5 milliLiter(s) IntraMuscular once  sodium chloride 0.65% Nasal 1 Spray(s) Both Nostrils three times a day          PHYSICAL EXAM:  T(C): 36.7 (05-12-19 @ 08:48), Max: 36.7 (05-11-19 @ 16:33)  HR: 86 (05-12-19 @ 08:48) (80 - 163)  BP: 98/56 (05-12-19 @ 08:48) (91/42 - 103/57)  RR: 19 (05-12-19 @ 08:48) (19 - 24)  SpO2: 100% (05-12-19 @ 08:48) (100% - 100%)  Wt(kg): --  I&O's Summary    11 May 2019 07:01  -  12 May 2019 07:00  --------------------------------------------------------  IN: 868 mL / OUT: 1850 mL / NET: -982 mL        Appearance: Normal	  HEENT:   Normal oral mucosa, PERRL, EOMI	  Lymphatic: No lymphadenopathy  Cardiovascular: Normal S1 S2, No JVD, No murmurs, No edema  Respiratory: Lungs grossly clear to auscultation	  Psychiatry: A & O x 3, Mood & affect appropriate  Gastrointestinal:  Soft, Non-tender, + BS	  Skin: No rashes, No ecchymoses, No cyanosis	  Neurologic: Non-focal  Extremities: Normal range of motion, No clubbing, cyanosis       LABS:	 	    CBC Full  -  ( 12 May 2019 03:00 )  WBC Count : 11.66 K/uL  Hemoglobin : 8.0 g/dL  Hematocrit : 25.2 %  Platelet Count - Automated : 269 K/uL  Mean Cell Volume : 88.4 fL  Mean Cell Hemoglobin : 28.1 pg  Mean Cell Hemoglobin Concentration : 31.7 %  Auto Neutrophil # : x  Auto Lymphocyte # : x  Auto Monocyte # : x  Auto Eosinophil # : x  Auto Basophil # : x  Auto Neutrophil % : x  Auto Lymphocyte % : x  Auto Monocyte % : x  Auto Eosinophil % : x  Auto Basophil % : x    05-12    134<L>  |  99  |  79<H>  ----------------------------<  201<H>  3.5   |  19<L>  |  2.37<H>  05-11    139  |  102  |  79<H>  ----------------------------<  106<H>  3.7   |  21<L>  |  2.31<H>    Ca    8.1<L>      12 May 2019 03:00  Ca    8.8      11 May 2019 06:25  Phos  3.8     05-12  Phos  3.4     05-11  Mg     2.2     05-12  Mg     2.0     05-11    TPro  7.4  /  Alb  3.80  /  TBili  0.5  /  DBili  x   /  AST  22  /  ALT  28  /  AlkPhos  74  05-11      proBNP:   Lipid Profile:   HgA1c:   TSH:       CARDIAC MARKERS:            TELEMETRY: 	    ECG:  	  RADIOLOGY:  OTHER: 	    PREVIOUS DIAGNOSTIC TESTING:    [ ] Echocardiogram:  [ ]  Catheterization:  [ ] Stress Test:  	  	  ASSESSMENT/PLAN:

## 2019-05-12 NOTE — PROGRESS NOTE ADULT - ASSESSMENT
Assessment  DMT2: 73y Female with DM T2 with hyperglycemia, on basal bolus insulin, blood sugars running high, family at bed side, eating full meals, insulin dose adjusted.  CAD: on medications, stable, monitored.  Hypothyroidism: On Synthroid 50 mcg po daily, compliant with Synthroid intake, asymptomatic.  HTN: Controlled,  on antihypertensive medications.  SOB: On Tx, oxygen.  Obesity: No strict exercise routines, not on any weight loss program, neither on low calorie diet.          Jillian Banks MD  Cell: [phone]  Office: [phone]

## 2019-05-12 NOTE — PROGRESS NOTE ADULT - ASSESSMENT
72 y/o female with pmhx of HTN, DM, CAD s/p CABG, HFrEF (LVEF 25%) severe MR, severe pulm HTN comes in with cough and SOB.   CT chest showed ground glass opacities, patient was started on IV zithromax, developed rash, now on Levaquin She was admitted to telemetry was given a dose of lopressor, patient became hypotensive and went into respiratory distress, patient was then moved to CCU. S/P LHC: TVD with pant LIMA, RHC PA 69/20/41, RA 14, PCWP 27, CO 3.3, CI 2.3 PA sat 39       Acute on chronic systolic heart failure.      Good urine out (11L over last 24 hrs) with Bumex + milrinone, will cont as is for the next 24 hours   Continue  Bumex 0.5mg IV gtt  Not on ACEI/ARB/ARNI due to MERVIN.   Continue hydral  to 25 mg po TID, isordil 10 mg po TID- bp at goal   Toprol 12.5 on board  Strict I/O and daily standing weights.   recent LV with Severe posterior MR. Will refer for mitral clip eval after diuresis if pt and family desire, the risks (renal failure, inadeqaute sx improvement) probably outweigh the benefits.

## 2019-05-12 NOTE — PROVIDER CONTACT NOTE (MEDICATION) - SITUATION
PO Metoprolol ER 12.5mg daily due to be given at 6AM. BP 94/49 and HR 80. IVP lopressor 2.5mg last given 02:30. Pt on Bumex gtt and milrinone gtt.

## 2019-05-12 NOTE — CONSULT NOTE ADULT - SUBJECTIVE AND OBJECTIVE BOX
this is a 73 year-old F with PMH HTN, CAD, HFrEF, severe MR, and severe pulm hypertension who presented to the hospital with cough and shortness of breath. On arrival, patient appeared tachypneic, heart monitor in place showing SVT with rate of 150. Confirmed on 12-lead EKG. Vagal maneuver performed with conversion to sinus rhythm. Patient's blood pressure, repeated 80s/50s with MAP of 62. Patient received 150cc of fluid with increase in MAP to 66. Given patient's history of HFrEF, fluids stopped at this time. Ordered full set of labs including VBG with lactate, coags, troponin, CBC, and BMP. Patient afebrile with temperature of 97.4.  patient's return to sinus rhythm (verified with repeat EKG), rapid response ended.  Patient is a 73y old  Female who presents with a chief complaint of sob (12 May 2019 15:49)      INTERVAL HPI/OVERNIGHT EVENTS:        PAST MEDICAL & SURGICAL HISTORY:  GERD (gastroesophageal reflux disease)  CAD (coronary artery disease): s/p CABG  Hypothyroidism  Hyperlipidemia  Diabetes mellitus, type 2  S/P CABG (coronary artery bypass graft)      REVIEW OF SYSTEMS: Total of twelve systems have been reviewed with patient and found to be negative unless mentioned in HPI      SOCIAL HISTORY  Alcohol: Does not drink  Tobacco: Does not smoke  Illicit substance use: None      FAMILY HISTORY: Non contributory to the present illness        azithromycin (Hives; Pruritus)        T(C): 36.9 (05-12-19 @ 16:54), Max: 36.9 (05-12-19 @ 16:54)  HR: 86 (05-12-19 @ 16:54) (80 - 163)  BP: 97/49 (05-12-19 @ 16:54) (91/42 - 103/57)  RR: 22 (05-12-19 @ 16:54) (19 - 24)  SpO2: 100% (05-12-19 @ 16:54) (100% - 100%)      05-11-19 @ 07:01  -  05-12-19 @ 07:00  --------------------------------------------------------  IN: 868 mL / OUT: 1850 mL / NET: -982 mL    05-12-19 @ 07:01  -  05-12-19 @ 17:23  --------------------------------------------------------  IN: 518 mL / OUT: 840 mL / NET: -322 mL        PHYSICAL EXAM:  GENERAL: Not in distress   CHEST/LUNG:  Aire ntry bilaterally  HEART: s1 and s2 present  ABDOMEN:  Nontender and  Nondistended  EXTREMITIES: No pedal  edema  CNS: Awake and Alert      LABS:                        8.0    11.66 )-----------( 269      ( 12 May 2019 03:00 )             25.2     05-12    134<L>  |  99  |  79<H>  ----------------------------<  201<H>  3.5   |  19<L>  |  2.37<H>    Ca    8.1<L>      12 May 2019 03:00  Phos  3.8     05-12  Mg     2.2     05-12    TPro  7.4  /  Alb  3.80  /  TBili  0.5  /  DBili  x   /  AST  22  /  ALT  28  /  AlkPhos  74  05-11    PT/INR - ( 12 May 2019 03:00 )   PT: 12.4 SEC;   INR: 1.11          PTT - ( 12 May 2019 03:00 )  PTT:36.2 SEC    CAPILLARY BLOOD GLUCOSE      POCT Blood Glucose.: 369 mg/dL (12 May 2019 17:21)  POCT Blood Glucose.: 263 mg/dL (12 May 2019 12:40)  POCT Blood Glucose.: 251 mg/dL (12 May 2019 08:35)  POCT Blood Glucose.: 213 mg/dL (12 May 2019 02:45)  POCT Blood Glucose.: 253 mg/dL (11 May 2019 23:01)            MEDICATIONS  (STANDING):  aspirin enteric coated 81 milliGRAM(s) Oral daily  atorvastatin 40 milliGRAM(s) Oral at bedtime  buDESOnide 160 MICROgram(s)/formoterol 4.5 MICROgram(s) Inhaler 2 Puff(s) Inhalation two times a day  buMETAnide Infusion 0.5 mG/Hr (2.5 mL/Hr) IV Continuous <Continuous>  chlorhexidine 4% Liquid 1 Application(s) Topical <User Schedule>  dextrose 5%. 1000 milliLiter(s) (50 mL/Hr) IV Continuous <Continuous>  dextrose 50% Injectable 12.5 Gram(s) IV Push once  dextrose 50% Injectable 25 Gram(s) IV Push once  dextrose 50% Injectable 25 Gram(s) IV Push once  docusate sodium 100 milliGRAM(s) Oral two times a day  ergocalciferol 85701 Unit(s) Oral <User Schedule>  gabapentin 100 milliGRAM(s) Oral two times a day  hydrALAZINE 25 milliGRAM(s) Oral three times a day  influenza   Vaccine 0.5 milliLiter(s) IntraMuscular once  insulin glargine Injectable (LANTUS) 45 Unit(s) SubCutaneous at bedtime  insulin lispro (HumaLOG) corrective regimen sliding scale   SubCutaneous three times a day before meals  insulin lispro (HumaLOG) corrective regimen sliding scale   SubCutaneous at bedtime  insulin lispro Injectable (HumaLOG) 4 Unit(s) SubCutaneous three times a day with meals  isosorbide   dinitrate Tablet (ISORDIL) 10 milliGRAM(s) Oral three times a day  levothyroxine 50 MICROGram(s) Oral daily  metoprolol succinate ER 12.5 milliGRAM(s) Oral daily  milrinone Infusion 0.25 MICROgram(s)/kG/Min (4.275 mL/Hr) IV Continuous <Continuous>  montelukast 10 milliGRAM(s) Oral daily  pantoprazole    Tablet 40 milliGRAM(s) Oral before breakfast  polyethylene glycol 3350 17 Gram(s) Oral two times a day  senna 2 Tablet(s) Oral at bedtime  sodium chloride 0.65% Nasal 1 Spray(s) Both Nostrils three times a day  ticagrelor 90 milliGRAM(s) Oral two times a day    MEDICATIONS  (PRN):  acetaminophen   Tablet .. 650 milliGRAM(s) Oral every 6 hours PRN Mild Pain (1 - 3), Moderate Pain (4 - 6), Severe Pain (7 - 10)  ALPRAZolam 0.25 milliGRAM(s) Oral every 12 hours PRN panic attack  dextrose 40% Gel 15 Gram(s) Oral once PRN Blood Glucose LESS THAN 70 milliGRAM(s)/deciliter  glucagon  Injectable 1 milliGRAM(s) IntraMuscular once PRN Glucose LESS THAN 70 milligrams/deciliter          RADIOLOGY & ADDITIONAL TESTS: A 73 year-old Female  with HTN, CAD, HFrEF, severe MR, and severe pulm hypertension who presented to the hospital with cough and shortness of breath on 4/25/19. On arrival, patient appeared tachypneic, found to have SVT with rate of 150. The Vagal maneuver performed with conversion to sinus rhythm.  She has admitted for acute decompensated CHF, managed by Cardiology team , was in CCU and now transferred to telemetry floor. She was also evaluated by ID, Dr. Abreu for pneumonia. Now the ID consult requested for second opinion for persistent Leukocytosis. The patient has RRT this morning due to Hypotension and SVT. She has no fever or chills BUT c/o chest heaviness and tightness.       REVIEW OF SYSTEMS: Total of twelve systems have been reviewed with patient and found to be negative unless mentioned in HPI        PAST MEDICAL & SURGICAL HISTORY:  GERD (gastroesophageal reflux disease)  CAD (coronary artery disease): s/p CABG  Hypothyroidism  Hyperlipidemia  Diabetes mellitus, type 2  S/P CABG (coronary artery bypass graft)        SOCIAL HISTORY  Alcohol: Does not drink  Tobacco: Does not smoke  Illicit substance use: None      FAMILY HISTORY: Non contributory to the present illness        ALLERGIES:  azithromycin (Hives; Pruritus)        T(C): 36.9 (05-12-19 @ 16:54), Max: 36.9 (05-12-19 @ 16:54)  HR: 86 (05-12-19 @ 16:54) (80 - 163)  BP: 97/49 (05-12-19 @ 16:54) (91/42 - 103/57)  RR: 22 (05-12-19 @ 16:54) (19 - 24)  SpO2: 100% (05-12-19 @ 16:54) (100% - 100%)      05-11-19 @ 07:01  -  05-12-19 @ 07:00  --------------------------------------------------------  IN: 868 mL / OUT: 1850 mL / NET: -982 mL    05-12-19 @ 07:01  -  05-12-19 @ 17:23  --------------------------------------------------------  IN: 518 mL / OUT: 840 mL / NET: -322 mL        PHYSICAL EXAM:  GENERAL: Appears ill  CHEST/LUNG:  Air entry bilaterally  HEART: s1 and s2 present  ABDOMEN:  Nontender and  Nondistended  EXTREMITIES: No pedal  edema  CNS: Awake and Alert          LABS:                        8.0    11.66 )-----------( 269      ( 12 May 2019 03:00 )             25.2     05-12    134<L>  |  99  |  79<H>  ----------------------------<  201<H>  3.5   |  19<L>  |  2.37<H>    Ca    8.1<L>      12 May 2019 03:00  Phos  3.8     05-12  Mg     2.2     05-12    TPro  7.4  /  Alb  3.80  /  TBili  0.5  /  DBili  x   /  AST  22  /  ALT  28  /  AlkPhos  74  05-11    PT/INR - ( 12 May 2019 03:00 )   PT: 12.4 SEC;   INR: 1.11     PTT - ( 12 May 2019 03:00 )  PTT:36.2 SEC        CAPILLARY BLOOD GLUCOSE  POCT Blood Glucose.: 369 mg/dL (12 May 2019 17:21)  POCT Blood Glucose.: 263 mg/dL (12 May 2019 12:40)  POCT Blood Glucose.: 251 mg/dL (12 May 2019 08:35)  POCT Blood Glucose.: 213 mg/dL (12 May 2019 02:45)  POCT Blood Glucose.: 253 mg/dL (11 May 2019 23:01)            MEDICATIONS  (STANDING):  aspirin enteric coated 81 milliGRAM(s) Oral daily  atorvastatin 40 milliGRAM(s) Oral at bedtime  buDESOnide 160 MICROgram(s)/formoterol 4.5 MICROgram(s) Inhaler 2 Puff(s) Inhalation two times a day  buMETAnide Infusion 0.5 mG/Hr (2.5 mL/Hr) IV Continuous <Continuous>  chlorhexidine 4% Liquid 1 Application(s) Topical <User Schedule>  docusate sodium 100 milliGRAM(s) Oral two times a day  ergocalciferol 97988 Unit(s) Oral <User Schedule>  gabapentin 100 milliGRAM(s) Oral two times a day  hydrALAZINE 25 milliGRAM(s) Oral three times a day  influenza   Vaccine 0.5 milliLiter(s) IntraMuscular once  insulin glargine Injectable (LANTUS) 45 Unit(s) SubCutaneous at bedtime  insulin lispro (HumaLOG) corrective regimen sliding scale   SubCutaneous three times a day before meals  insulin lispro (HumaLOG) corrective regimen sliding scale   SubCutaneous at bedtime  insulin lispro Injectable (HumaLOG) 4 Unit(s) SubCutaneous three times a day with meals  isosorbide   dinitrate Tablet (ISORDIL) 10 milliGRAM(s) Oral three times a day  levothyroxine 50 MICROGram(s) Oral daily  metoprolol succinate ER 12.5 milliGRAM(s) Oral daily  milrinone Infusion 0.25 MICROgram(s)/kG/Min (4.275 mL/Hr) IV Continuous <Continuous>  montelukast 10 milliGRAM(s) Oral daily  pantoprazole    Tablet 40 milliGRAM(s) Oral before breakfast  polyethylene glycol 3350 17 Gram(s) Oral two times a day  senna 2 Tablet(s) Oral at bedtime  sodium chloride 0.65% Nasal 1 Spray(s) Both Nostrils three times a day  ticagrelor 90 milliGRAM(s) Oral two times a day    MEDICATIONS  (PRN):  acetaminophen   Tablet .. 650 milliGRAM(s) Oral every 6 hours PRN Mild Pain (1 - 3), Moderate Pain (4 - 6), Severe Pain (7 - 10)  ALPRAZolam 0.25 milliGRAM(s) Oral every 12 hours PRN panic attack  dextrose 40% Gel 15 Gram(s) Oral once PRN Blood Glucose LESS THAN 70 milliGRAM(s)/deciliter  glucagon  Injectable 1 milliGRAM(s) IntraMuscular once PRN Glucose LESS THAN 70 milligrams/deciliter          RADIOLOGY & ADDITIONAL TESTS:    5/9/19 : Xray Chest 1 View-PORTABLE IMMEDIATE (05.09.19 @ 01:31) IMPRESSION:  New interstitial pulmonary edema.

## 2019-05-12 NOTE — PROGRESS NOTE ADULT - ASSESSMENT
74 YO F w/o h/o CAD, s/p CABG Systolic CHF, CKD 3b, who p/w sob and pino.    - Symptomatic Supraventricular Tachycardia:      - improved s/p vagal maneuvers, tele now in sinus rhythm      - c/w tele, c/w bb      - if pt has additional symptomatic episodes then can consider in addition to vagal maneuvers beta blockers, ccb, or adenosine      - EP consulted      - f/u cards and consultants      - all consultants & medical team members management greatly appreciated      - Acute on Chronic Systolic CHF Exacerbation, and Severe Mitral Valve Regurgitation:      - c/w diuresis and cards meds      - all consultants management appreciated        - severe systolic dysfunction with severe MVR - pt/family agreeable to mitral valve clip - ?outpt/inpt intervention      - c/w cardiac meds      - PT, strict i/o, tele     - Panic Attack:       - xanax prn        - c/w optimization of co-morbidities    - Symptomatic Anemia 2/2 gastrointestinal bleed:      - relatively stable h/h      - slow bleed, conservative management for now      - monitor h/h, maintain hgb > 7     - Acute Gout Flare:       - improved    - NSTEMI:      - asa/brilinta/statin      - adjust management prn      - tele    - MERVIN on CKD      - stable renal function.  trend renal function.      - optimize comorbidities. Avoid nephrotoxic rx     - DM II with long term complications of hyperglycemia, hypoglcyemia, and nephropathy   - c/w dm rx, as above    - monitor FS closely given hypoglycemia       - complicated by poor nutritional compliance (pt fed food from outside the hospital)       - c/w dm rx      - dm education

## 2019-05-13 DIAGNOSIS — I34.0 NONRHEUMATIC MITRAL (VALVE) INSUFFICIENCY: ICD-10-CM

## 2019-05-13 LAB
ALBUMIN SERPL ELPH-MCNC: 3.7 G/DL — SIGNIFICANT CHANGE UP (ref 3.3–5)
ALP SERPL-CCNC: 69 U/L — SIGNIFICANT CHANGE UP (ref 40–120)
ALT FLD-CCNC: 23 U/L — SIGNIFICANT CHANGE UP (ref 4–33)
ANION GAP SERPL CALC-SCNC: 15 MMO/L — HIGH (ref 7–14)
ANION GAP SERPL CALC-SCNC: 16 MMO/L — HIGH (ref 7–14)
ANION GAP SERPL CALC-SCNC: 18 MMO/L — HIGH (ref 7–14)
AST SERPL-CCNC: 19 U/L — SIGNIFICANT CHANGE UP (ref 4–32)
BILIRUB SERPL-MCNC: 0.4 MG/DL — SIGNIFICANT CHANGE UP (ref 0.2–1.2)
BUN SERPL-MCNC: 72 MG/DL — HIGH (ref 7–23)
BUN SERPL-MCNC: 74 MG/DL — HIGH (ref 7–23)
BUN SERPL-MCNC: 74 MG/DL — HIGH (ref 7–23)
CALCIUM SERPL-MCNC: 8.6 MG/DL — SIGNIFICANT CHANGE UP (ref 8.4–10.5)
CALCIUM SERPL-MCNC: 9.1 MG/DL — SIGNIFICANT CHANGE UP (ref 8.4–10.5)
CALCIUM SERPL-MCNC: 9.1 MG/DL — SIGNIFICANT CHANGE UP (ref 8.4–10.5)
CHLORIDE SERPL-SCNC: 103 MMOL/L — SIGNIFICANT CHANGE UP (ref 98–107)
CHLORIDE SERPL-SCNC: 97 MMOL/L — LOW (ref 98–107)
CHLORIDE SERPL-SCNC: 98 MMOL/L — SIGNIFICANT CHANGE UP (ref 98–107)
CO2 SERPL-SCNC: 21 MMOL/L — LOW (ref 22–31)
CO2 SERPL-SCNC: 21 MMOL/L — LOW (ref 22–31)
CO2 SERPL-SCNC: 23 MMOL/L — SIGNIFICANT CHANGE UP (ref 22–31)
CREAT SERPL-MCNC: 2.09 MG/DL — HIGH (ref 0.5–1.3)
CREAT SERPL-MCNC: 2.11 MG/DL — HIGH (ref 0.5–1.3)
CREAT SERPL-MCNC: 2.11 MG/DL — HIGH (ref 0.5–1.3)
GLUCOSE SERPL-MCNC: 160 MG/DL — HIGH (ref 70–99)
GLUCOSE SERPL-MCNC: 323 MG/DL — HIGH (ref 70–99)
GLUCOSE SERPL-MCNC: 330 MG/DL — HIGH (ref 70–99)
HCT VFR BLD CALC: 26.1 % — LOW (ref 34.5–45)
HGB BLD-MCNC: 8.1 G/DL — LOW (ref 11.5–15.5)
MAGNESIUM SERPL-MCNC: 2.3 MG/DL — SIGNIFICANT CHANGE UP (ref 1.6–2.6)
MCHC RBC-ENTMCNC: 27.7 PG — SIGNIFICANT CHANGE UP (ref 27–34)
MCHC RBC-ENTMCNC: 31 % — LOW (ref 32–36)
MCV RBC AUTO: 89.4 FL — SIGNIFICANT CHANGE UP (ref 80–100)
NRBC # FLD: 0.03 K/UL — SIGNIFICANT CHANGE UP (ref 0–0)
PHOSPHATE SERPL-MCNC: 3.1 MG/DL — SIGNIFICANT CHANGE UP (ref 2.5–4.5)
PLATELET # BLD AUTO: 304 K/UL — SIGNIFICANT CHANGE UP (ref 150–400)
PMV BLD: 9 FL — SIGNIFICANT CHANGE UP (ref 7–13)
POTASSIUM SERPL-MCNC: 3.6 MMOL/L — SIGNIFICANT CHANGE UP (ref 3.5–5.3)
POTASSIUM SERPL-MCNC: 4.2 MMOL/L — SIGNIFICANT CHANGE UP (ref 3.5–5.3)
POTASSIUM SERPL-MCNC: 4.3 MMOL/L — SIGNIFICANT CHANGE UP (ref 3.5–5.3)
POTASSIUM SERPL-SCNC: 3.6 MMOL/L — SIGNIFICANT CHANGE UP (ref 3.5–5.3)
POTASSIUM SERPL-SCNC: 4.2 MMOL/L — SIGNIFICANT CHANGE UP (ref 3.5–5.3)
POTASSIUM SERPL-SCNC: 4.3 MMOL/L — SIGNIFICANT CHANGE UP (ref 3.5–5.3)
PROCALCITONIN SERPL-MCNC: 0.3 NG/ML — HIGH (ref 0.02–0.1)
PROCALCITONIN SERPL-MCNC: 0.34 NG/ML — HIGH (ref 0.02–0.1)
PROT SERPL-MCNC: 7.4 G/DL — SIGNIFICANT CHANGE UP (ref 6–8.3)
RBC # BLD: 2.92 M/UL — LOW (ref 3.8–5.2)
RBC # FLD: 16.6 % — HIGH (ref 10.3–14.5)
SODIUM SERPL-SCNC: 135 MMOL/L — SIGNIFICANT CHANGE UP (ref 135–145)
SODIUM SERPL-SCNC: 137 MMOL/L — SIGNIFICANT CHANGE UP (ref 135–145)
SODIUM SERPL-SCNC: 140 MMOL/L — SIGNIFICANT CHANGE UP (ref 135–145)
SPECIMEN SOURCE: SIGNIFICANT CHANGE UP
SPECIMEN SOURCE: SIGNIFICANT CHANGE UP
WBC # BLD: 10.18 K/UL — SIGNIFICANT CHANGE UP (ref 3.8–10.5)
WBC # FLD AUTO: 10.18 K/UL — SIGNIFICANT CHANGE UP (ref 3.8–10.5)

## 2019-05-13 PROCEDURE — 99233 SBSQ HOSP IP/OBS HIGH 50: CPT

## 2019-05-13 PROCEDURE — 71250 CT THORAX DX C-: CPT | Mod: 26

## 2019-05-13 RX ORDER — INSULIN LISPRO 100/ML
10 VIAL (ML) SUBCUTANEOUS
Refills: 0 | Status: DISCONTINUED | OUTPATIENT
Start: 2019-05-13 | End: 2019-05-16

## 2019-05-13 RX ORDER — METOPROLOL TARTRATE 50 MG
12.5 TABLET ORAL DAILY
Refills: 0 | Status: DISCONTINUED | OUTPATIENT
Start: 2019-05-14 | End: 2019-05-16

## 2019-05-13 RX ORDER — INSULIN GLARGINE 100 [IU]/ML
55 INJECTION, SOLUTION SUBCUTANEOUS AT BEDTIME
Refills: 0 | Status: DISCONTINUED | OUTPATIENT
Start: 2019-05-13 | End: 2019-05-16

## 2019-05-13 RX ORDER — POTASSIUM CHLORIDE 20 MEQ
20 PACKET (EA) ORAL ONCE
Refills: 0 | Status: COMPLETED | OUTPATIENT
Start: 2019-05-13 | End: 2019-05-13

## 2019-05-13 RX ORDER — HYDRALAZINE HCL 50 MG
10 TABLET ORAL THREE TIMES A DAY
Refills: 0 | Status: DISCONTINUED | OUTPATIENT
Start: 2019-05-13 | End: 2019-05-15

## 2019-05-13 RX ADMIN — Medication 10 MILLIGRAM(S): at 22:19

## 2019-05-13 RX ADMIN — Medication 25 MILLIGRAM(S): at 08:01

## 2019-05-13 RX ADMIN — MILRINONE LACTATE 4.28 MICROGRAM(S)/KG/MIN: 1 INJECTION, SOLUTION INTRAVENOUS at 08:03

## 2019-05-13 RX ADMIN — ATORVASTATIN CALCIUM 40 MILLIGRAM(S): 80 TABLET, FILM COATED ORAL at 22:13

## 2019-05-13 RX ADMIN — Medication 3: at 12:31

## 2019-05-13 RX ADMIN — Medication 81 MILLIGRAM(S): at 12:14

## 2019-05-13 RX ADMIN — Medication 50 MICROGRAM(S): at 06:05

## 2019-05-13 RX ADMIN — ISOSORBIDE DINITRATE 10 MILLIGRAM(S): 5 TABLET ORAL at 08:01

## 2019-05-13 RX ADMIN — Medication 4 UNIT(S): at 17:34

## 2019-05-13 RX ADMIN — Medication 4 UNIT(S): at 08:42

## 2019-05-13 RX ADMIN — Medication 4 UNIT(S): at 12:31

## 2019-05-13 RX ADMIN — Medication 12.5 MILLIGRAM(S): at 06:05

## 2019-05-13 RX ADMIN — BUMETANIDE 2.5 MG/HR: 0.25 INJECTION INTRAMUSCULAR; INTRAVENOUS at 08:03

## 2019-05-13 RX ADMIN — MONTELUKAST 10 MILLIGRAM(S): 4 TABLET, CHEWABLE ORAL at 12:14

## 2019-05-13 RX ADMIN — BUDESONIDE AND FORMOTEROL FUMARATE DIHYDRATE 2 PUFF(S): 160; 4.5 AEROSOL RESPIRATORY (INHALATION) at 08:00

## 2019-05-13 RX ADMIN — TICAGRELOR 90 MILLIGRAM(S): 90 TABLET ORAL at 06:05

## 2019-05-13 RX ADMIN — POLYETHYLENE GLYCOL 3350 17 GRAM(S): 17 POWDER, FOR SOLUTION ORAL at 06:06

## 2019-05-13 RX ADMIN — TICAGRELOR 90 MILLIGRAM(S): 90 TABLET ORAL at 17:33

## 2019-05-13 RX ADMIN — Medication 5: at 17:34

## 2019-05-13 RX ADMIN — Medication 100 MILLIGRAM(S): at 06:05

## 2019-05-13 RX ADMIN — Medication 1 SPRAY(S): at 12:15

## 2019-05-13 RX ADMIN — SENNA PLUS 2 TABLET(S): 8.6 TABLET ORAL at 22:13

## 2019-05-13 RX ADMIN — GABAPENTIN 100 MILLIGRAM(S): 400 CAPSULE ORAL at 06:05

## 2019-05-13 RX ADMIN — PANTOPRAZOLE SODIUM 40 MILLIGRAM(S): 20 TABLET, DELAYED RELEASE ORAL at 06:05

## 2019-05-13 RX ADMIN — CHLORHEXIDINE GLUCONATE 1 APPLICATION(S): 213 SOLUTION TOPICAL at 12:14

## 2019-05-13 RX ADMIN — ISOSORBIDE DINITRATE 10 MILLIGRAM(S): 5 TABLET ORAL at 17:33

## 2019-05-13 RX ADMIN — ISOSORBIDE DINITRATE 10 MILLIGRAM(S): 5 TABLET ORAL at 00:31

## 2019-05-13 RX ADMIN — Medication 1 SPRAY(S): at 22:14

## 2019-05-13 RX ADMIN — Medication 2: at 22:19

## 2019-05-13 RX ADMIN — Medication 25 MILLIGRAM(S): at 00:31

## 2019-05-13 RX ADMIN — INSULIN GLARGINE 55 UNIT(S): 100 INJECTION, SOLUTION SUBCUTANEOUS at 22:28

## 2019-05-13 RX ADMIN — Medication 1 SPRAY(S): at 06:05

## 2019-05-13 RX ADMIN — BUDESONIDE AND FORMOTEROL FUMARATE DIHYDRATE 2 PUFF(S): 160; 4.5 AEROSOL RESPIRATORY (INHALATION) at 22:13

## 2019-05-13 RX ADMIN — Medication 20 MILLIEQUIVALENT(S): at 12:31

## 2019-05-13 RX ADMIN — Medication 1: at 08:42

## 2019-05-13 RX ADMIN — GABAPENTIN 100 MILLIGRAM(S): 400 CAPSULE ORAL at 17:33

## 2019-05-13 NOTE — PROGRESS NOTE ADULT - PROBLEM SELECTOR PLAN 1
5/9/19 RHC showed RA 14, V 71/17, PA 69/20/41, PCWP 27, PA sat 39%, CO3.36, CI 2.37, SVR 1825. PVR 4.17. She was started on bumex and milrinone gtt.   Diuresed -1.3 L in 24 hrs. Has not stood up for standing weights. Will try today.  BUN/Cr improving. Still with moderately elevated JVP on exam today.   Continue Bumex gtt 0.5 mg/hr.  Continue milrinone 0.25 mcg/kg/min.   -continue hydral 25 mg po TID.   -Continue isordil 10 mg po TID.  -Continue Toprol 12.5 mg po qd.  hypokalemic 3.6, supplemented with KCL 20 meq x 1. Please check BMP q 12 hrs and supplement to keep K 4.0-4.5 and mag 2.0.

## 2019-05-13 NOTE — PROGRESS NOTE ADULT - PROBLEM SELECTOR PLAN 1
Will increase Lantus to 55 units at bed time.  Will increase Humalog to 10 units before each meal in addition to Humalog correction scale coverage.  Patient counseled for compliance with consistent low carb diet.

## 2019-05-13 NOTE — PROGRESS NOTE ADULT - SUBJECTIVE AND OBJECTIVE BOX
SELVIN CLAIRE:[id number],   73yFemale followed for:  azithromycin (Hives; Pruritus)    PAST MEDICAL & SURGICAL HISTORY:  GERD (gastroesophageal reflux disease)  CAD (coronary artery disease): s/p CABG  Hypothyroidism  Hyperlipidemia  Diabetes mellitus, type 2  S/P CABG (coronary artery bypass graft)    FAMILY HISTORY:  Family history of hypertension (Sibling): sister  Family history of diabetes mellitus (Sibling): brothers/sisters    MEDICATIONS  (STANDING):  aspirin enteric coated 81 milliGRAM(s) Oral daily  atorvastatin 40 milliGRAM(s) Oral at bedtime  buDESOnide 160 MICROgram(s)/formoterol 4.5 MICROgram(s) Inhaler 2 Puff(s) Inhalation two times a day  buMETAnide Infusion 0.5 mG/Hr (2.5 mL/Hr) IV Continuous <Continuous>  chlorhexidine 4% Liquid 1 Application(s) Topical <User Schedule>  dextrose 5%. 1000 milliLiter(s) (50 mL/Hr) IV Continuous <Continuous>  dextrose 50% Injectable 12.5 Gram(s) IV Push once  dextrose 50% Injectable 25 Gram(s) IV Push once  dextrose 50% Injectable 25 Gram(s) IV Push once  docusate sodium 100 milliGRAM(s) Oral two times a day  ergocalciferol 86268 Unit(s) Oral <User Schedule>  gabapentin 100 milliGRAM(s) Oral two times a day  hydrALAZINE 25 milliGRAM(s) Oral three times a day  influenza   Vaccine 0.5 milliLiter(s) IntraMuscular once  insulin glargine Injectable (LANTUS) 45 Unit(s) SubCutaneous at bedtime  insulin lispro (HumaLOG) corrective regimen sliding scale   SubCutaneous three times a day before meals  insulin lispro (HumaLOG) corrective regimen sliding scale   SubCutaneous at bedtime  insulin lispro Injectable (HumaLOG) 4 Unit(s) SubCutaneous three times a day with meals  isosorbide   dinitrate Tablet (ISORDIL) 10 milliGRAM(s) Oral three times a day  levothyroxine 50 MICROGram(s) Oral daily  metoprolol succinate ER 12.5 milliGRAM(s) Oral daily  milrinone Infusion 0.25 MICROgram(s)/kG/Min (4.275 mL/Hr) IV Continuous <Continuous>  montelukast 10 milliGRAM(s) Oral daily  pantoprazole    Tablet 40 milliGRAM(s) Oral before breakfast  polyethylene glycol 3350 17 Gram(s) Oral two times a day  senna 2 Tablet(s) Oral at bedtime  sodium chloride 0.65% Nasal 1 Spray(s) Both Nostrils three times a day  ticagrelor 90 milliGRAM(s) Oral two times a day    MEDICATIONS  (PRN):  acetaminophen   Tablet .. 650 milliGRAM(s) Oral every 6 hours PRN Mild Pain (1 - 3), Moderate Pain (4 - 6), Severe Pain (7 - 10)  ALPRAZolam 0.25 milliGRAM(s) Oral every 12 hours PRN panic attack  dextrose 40% Gel 15 Gram(s) Oral once PRN Blood Glucose LESS THAN 70 milliGRAM(s)/deciliter  glucagon  Injectable 1 milliGRAM(s) IntraMuscular once PRN Glucose LESS THAN 70 milligrams/deciliter      Vital Signs Last 24 Hrs  T(C): 36.5 (13 May 2019 06:03), Max: 36.9 (12 May 2019 16:54)  T(F): 97.7 (13 May 2019 06:03), Max: 98.4 (12 May 2019 16:54)  HR: 88 (13 May 2019 06:03) (82 - 91)  BP: 111/53 (13 May 2019 06:03) (97/48 - 111/53)  BP(mean): --  RR: 18 (13 May 2019 06:03) (18 - 22)  SpO2: 100% (13 May 2019 06:03) (100% - 100%)  nc/at  s1s2  cta  soft, nt, nd no guarding or rebound  no c/c/e    CBC Full  -  ( 12 May 2019 03:00 )  WBC Count : 11.66 K/uL  RBC Count : 2.85 M/uL  Hemoglobin : 8.0 g/dL  Hematocrit : 25.2 %  Platelet Count - Automated : 269 K/uL  Mean Cell Volume : 88.4 fL  Mean Cell Hemoglobin : 28.1 pg  Mean Cell Hemoglobin Concentration : 31.7 %  Auto Neutrophil # : x  Auto Lymphocyte # : x  Auto Monocyte # : x  Auto Eosinophil # : x  Auto Basophil # : x  Auto Neutrophil % : x  Auto Lymphocyte % : x  Auto Monocyte % : x  Auto Eosinophil % : x  Auto Basophil % : x    05-12    134<L>  |  99  |  79<H>  ----------------------------<  201<H>  3.5   |  19<L>  |  2.37<H>    Ca    8.1<L>      12 May 2019 03:00  Phos  3.8     05-12  Mg     2.2     05-12      PT/INR - ( 12 May 2019 03:00 )   PT: 12.4 SEC;   INR: 1.11          PTT - ( 12 May 2019 03:00 )  PTT:36.2 SEC

## 2019-05-13 NOTE — PROGRESS NOTE ADULT - ASSESSMENT
72 YO F w/o h/o CAD, s/p CABG Systolic CHF, CKD 3b, who p/w sob and pino.    - Symptomatic Supraventricular Tachycardia:      - improving symptoms, however > 30 beats svt overnight on tele       - c/w tele      - ?increase bb if improved bp support, otherwise c/w same dosage      - f/u cards and consultants      - all consultants & medical team members management greatly appreciated      - Acute on Chronic Systolic CHF Exacerbation, and Severe Mitral Valve Regurgitation:      - c/w diuresis and cards meds      - all consultants management appreciated        - severe systolic dysfunction with severe MVR - pt/family agreeable to mitral valve clip - outpt referal      - c/w cardiac meds      - PT, strict i/o, tele     - Leukocytosis:      - improved, unspecified etiology       - ct chest pending    - Panic Attack:       - xanax prn        - c/w optimization of co-morbidities    - Symptomatic Anemia 2/2 gastrointestinal bleed:      - relatively stable h/h      - slow bleed, conservative management for now      - monitor h/h, maintain hgb > 7     - Acute Gout Flare:       - improved    - NSTEMI:      - asa/brilinta/statin      - adjust management prn      - tele    - MERVIN on CKD      - stable renal function.  trend renal function.      - optimize comorbidities. Avoid nephrotoxic rx     - DM II with long term complications of hyperglycemia, hypoglcyemia, and nephropathy   - c/w dm rx, as above    - monitor FS closely given hypoglycemia       - complicated by poor nutritional compliance (pt fed food from outside the hospital)       - c/w dm rx      - dm education

## 2019-05-13 NOTE — CONSULT NOTE ADULT - SUBJECTIVE AND OBJECTIVE BOX
Patient seen and evaluated @ 4S   Chief Complaint: SOB    HPI:  Patient has had a complicated hospital course requiring multiple consultations to optimize her care.     Briefly, she is a 73F with history of CAD s/p CABG, systolic CHF (EF 20%), CKD III, who p/w sob and pino. Initially manifesting with lab data and features of ACS she was treated with guideline therapy and also for acute decompensated systolic heart failure. Hospital course complicated by hypotension in the setting of beta blocker administration during acute decompensated HF. She was transition to the CCU for management and eventually cath revealing volume overload as well as 100% occlusion in LAD and RCA. HF service was involved and attempts were made to optimize her medical regimen and diurese her. Her assessment was notable for ischemic severe MR and this was evaluated by LV revealing severe functional MR. Course was further complicated by gouty flare, PNA and anemia.    Patient was eventually transferred to telemetry where she has several episodes of SVT over the course of the past few day prompting EP consultation.     PMH:   Urinary tract infection  GERD (gastroesophageal reflux disease)  Hypertension  CAD (coronary artery disease)  Hypothyroidism  Hyperlipidemia  Diabetes mellitus, type 2  Diabetes mellitus type 2 in nonobese    PSH:   S/P CABG (coronary artery bypass graft)    Medications:   acetaminophen   Tablet .. 650 milliGRAM(s) Oral every 6 hours PRN  ALPRAZolam 0.25 milliGRAM(s) Oral every 12 hours PRN  aspirin enteric coated 81 milliGRAM(s) Oral daily  atorvastatin 40 milliGRAM(s) Oral at bedtime  buDESOnide 160 MICROgram(s)/formoterol 4.5 MICROgram(s) Inhaler 2 Puff(s) Inhalation two times a day  buMETAnide Infusion 0.5 mG/Hr IV Continuous <Continuous>  chlorhexidine 4% Liquid 1 Application(s) Topical <User Schedule>  dextrose 40% Gel 15 Gram(s) Oral once PRN  dextrose 5%. 1000 milliLiter(s) IV Continuous <Continuous>  dextrose 50% Injectable 12.5 Gram(s) IV Push once  dextrose 50% Injectable 25 Gram(s) IV Push once  dextrose 50% Injectable 25 Gram(s) IV Push once  docusate sodium 100 milliGRAM(s) Oral two times a day  ergocalciferol 38842 Unit(s) Oral <User Schedule>  gabapentin 100 milliGRAM(s) Oral two times a day  glucagon  Injectable 1 milliGRAM(s) IntraMuscular once PRN  hydrALAZINE 25 milliGRAM(s) Oral three times a day  influenza   Vaccine 0.5 milliLiter(s) IntraMuscular once  insulin glargine Injectable (LANTUS) 45 Unit(s) SubCutaneous at bedtime  insulin lispro (HumaLOG) corrective regimen sliding scale   SubCutaneous three times a day before meals  insulin lispro (HumaLOG) corrective regimen sliding scale   SubCutaneous at bedtime  insulin lispro Injectable (HumaLOG) 4 Unit(s) SubCutaneous three times a day with meals  isosorbide   dinitrate Tablet (ISORDIL) 10 milliGRAM(s) Oral three times a day  levothyroxine 50 MICROGram(s) Oral daily  milrinone Infusion 0.25 MICROgram(s)/kG/Min IV Continuous <Continuous>  montelukast 10 milliGRAM(s) Oral daily  pantoprazole    Tablet 40 milliGRAM(s) Oral before breakfast  polyethylene glycol 3350 17 Gram(s) Oral two times a day  senna 2 Tablet(s) Oral at bedtime  sodium chloride 0.65% Nasal 1 Spray(s) Both Nostrils three times a day  ticagrelor 90 milliGRAM(s) Oral two times a day    Allergies:  azithromycin (Hives; Pruritus)    FAMILY HISTORY:  Family history of hypertension (Sibling): sister  Family history of diabetes mellitus (Sibling): brothers/sisters    Social History:  Smoking: no  Alcohol: no  Drugs: no    Review of Systems:  REVIEW OF SYSTEMS:    CONSTITUTIONAL: No weakness, fevers or chills  EYES/ENT: No visual changes;  No dysphagia  NECK: No pain or stiffness  RESPIRATORY: No cough, wheezing, hemoptysis; No shortness of breath  CARDIOVASCULAR: No chest pain or palpitations; No lower extremity edema  GASTROINTESTINAL: No abdominal or epigastric pain. No nausea, vomiting, or hematemesis; No diarrhea or constipation. No melena or hematochezia.  BACK: No back pain  GENITOURINARY: No dysuria, frequency or hematuria  NEUROLOGICAL: No numbness or weakness  SKIN: No itching, burning, rashes, or lesions   All other review of systems is negative unless indicated above.  [ x] 10 point review of systems is otherwise negative except as mentioned above            [ ]Unable to obtain    Physical Exam:  T(C): 36.7 (19 @ 11:56), Max: 36.9 (19 @ 16:54)  HR: 88 (19 @ 11:56) (82 - 91)  BP: 145/71 (19 @ 11:56) (97/49 - 145/71)  RR: 17 (19 @ 11:56) (17 - 22)  SpO2: 100% (19 @ 11:56) (100% - 100%)  Wt(kg): --  GENERAL: No acute distress, well-developed  HEAD:  Atraumatic, Normocephalic  ENT: EOMI, PERRLA, conjunctiva and sclera clear, Neck supple, No JVD, moist mucosa  CHEST/LUNG: Clear to auscultation bilaterally; No wheeze, equal breath sounds bilaterally   BACK: No spinal tenderness  HEART: Regular rate and rhythm; No murmurs, rubs, or gallops  ABDOMEN: Soft, Nontender, Nondistended; Bowel sounds present  EXTREMITIES:  No clubbing, cyanosis, or edema  PSYCH: Nl behavior, nl affect  NEUROLOGY: AAOx3, non-focal, cranial nerves intact  SKIN: Normal color, No rashes or lesions       @ 07:  -   @ 07:00  --------------------------------------------------------  IN: 832.5 mL / OUT: 2140 mL / NET: -1307.5 mL     @ 07:01  -   @ 15:36  --------------------------------------------------------  IN: 561 mL / OUT: 800 mL / NET: -239 mL      Daily     Daily Weight in k.7 (13 May 2019 06:03)    Cardiovascular Diagnostic Testing:  ECG: NSR    Echo:    Stress Testing:    Cath:    Interpretation of Telemetry: SR w/ PVCs / Several episodes of narrow complex tachycardia    Imaging:    Labs:                        8.1    10.18 )-----------( 304      ( 13 May 2019 06:10 )             26.1         140  |  103  |  72<H>  ----------------------------<  160<H>  3.6   |  21<L>  |  2.09<H>    Ca    8.6      13 May 2019 06:10  Phos  3.1       Mg     2.3         TPro  7.4  /  Alb  3.7  /  TBili  0.4  /  DBili  x   /  AST  19  /  ALT  23  /  AlkPhos  69      PT/INR - ( 12 May 2019 03:00 )   PT: 12.4 SEC;   INR: 1.11         PTT - ( 12 May 2019 03:00 )  PTT:36.2 SEC  CARDIAC MARKERS ( 12 May 2019 03:00 )  x     / x     / 58 u/L / 3.36 ng/mL / x        Thyroid Stimulating Hormone, Serum: 3.44 uIU/mL ( @ 07:00)

## 2019-05-13 NOTE — PROGRESS NOTE ADULT - ATTENDING COMMENTS
5/9: sob secondary to severe MR and chf exacerbation: for surgical evaluation:  5/10: May use bipap if requiried for increasing work of breathing: >? for alonso clip: defer to ccu  5/13: She seesm to eb doing ok but on meds including primacor as wellas diuretics: If her SOB increases, can u se bipap

## 2019-05-13 NOTE — PROGRESS NOTE ADULT - ASSESSMENT
73YOF with hx of CAD s/p CABG, hypothyroidism, CKD, CHF, HTN, DM p/w worsening ZEE and sob.    A/p  MERVIN  Etiology?  Likely sec to CHF  s/p cardiac cath 05/09  Renal function improving  on bumex gtt  monitor bmp  avoid nephrotoxic agents      CKD stage 3  baseline cr 1.5-1.8  likely sec to HTN/DM/chf  elevated PTH, vit d insufficieny, continue vit d 47179 QW x 4 dose   phos optimal    CHF  monitor daily weight and i/o  diuretics per cardio  f/u cardio

## 2019-05-13 NOTE — PROGRESS NOTE ADULT - SUBJECTIVE AND OBJECTIVE BOX
[facility] [place] [facility]   MD RAHEEL SHAH MD ANGELA WONG, PA    TEL:  OFFICE: [phone]  DR SANTOYO CELL: [phone]  DR. NGUYEN CELL: [phone]  UMM KENDALL CELL: [phone]        Patient is a 73y old  Female who presents with a chief complaint of sob (13 May 2019 15:36)      Patient seen and examined at bedside. chest pain/sob better    VITALS:  T(F): 98.1 (05-13-19 @ 11:56), Max: 98.1 (05-13-19 @ 11:56)  HR: 88 (05-13-19 @ 11:56)  BP: 145/71 (05-13-19 @ 11:56)  RR: 17 (05-13-19 @ 11:56)  SpO2: 100% (05-13-19 @ 11:56)  Wt(kg): --    05-12 @ 07:01  -  05-13 @ 07:00  --------------------------------------------------------  IN: 832.5 mL / OUT: 2140 mL / NET: -1307.5 mL    05-13 @ 07:01  -  05-13 @ 17:02  --------------------------------------------------------  IN: 561 mL / OUT: 800 mL / NET: -239 mL          PHYSICAL EXAM:  Constitutional: NAD  Neck: No JVD  Respiratory: CTAB, no wheezes, rales or rhonchi  Cardiovascular: S1, S2, RRR  Gastrointestinal: BS+, soft, NT/ND  Extremities: No peripheral edema    Hospital Medications:   MEDICATIONS  (STANDING):  aspirin enteric coated 81 milliGRAM(s) Oral daily  atorvastatin 40 milliGRAM(s) Oral at bedtime  buDESOnide 160 MICROgram(s)/formoterol 4.5 MICROgram(s) Inhaler 2 Puff(s) Inhalation two times a day  buMETAnide Infusion 0.5 mG/Hr (2.5 mL/Hr) IV Continuous <Continuous>  chlorhexidine 4% Liquid 1 Application(s) Topical <User Schedule>  dextrose 5%. 1000 milliLiter(s) (50 mL/Hr) IV Continuous <Continuous>  dextrose 50% Injectable 12.5 Gram(s) IV Push once  dextrose 50% Injectable 25 Gram(s) IV Push once  dextrose 50% Injectable 25 Gram(s) IV Push once  docusate sodium 100 milliGRAM(s) Oral two times a day  ergocalciferol 77026 Unit(s) Oral <User Schedule>  gabapentin 100 milliGRAM(s) Oral two times a day  hydrALAZINE 25 milliGRAM(s) Oral three times a day  influenza   Vaccine 0.5 milliLiter(s) IntraMuscular once  insulin glargine Injectable (LANTUS) 45 Unit(s) SubCutaneous at bedtime  insulin lispro (HumaLOG) corrective regimen sliding scale   SubCutaneous three times a day before meals  insulin lispro (HumaLOG) corrective regimen sliding scale   SubCutaneous at bedtime  insulin lispro Injectable (HumaLOG) 4 Unit(s) SubCutaneous three times a day with meals  isosorbide   dinitrate Tablet (ISORDIL) 10 milliGRAM(s) Oral three times a day  levothyroxine 50 MICROGram(s) Oral daily  milrinone Infusion 0.25 MICROgram(s)/kG/Min (4.275 mL/Hr) IV Continuous <Continuous>  montelukast 10 milliGRAM(s) Oral daily  pantoprazole    Tablet 40 milliGRAM(s) Oral before breakfast  polyethylene glycol 3350 17 Gram(s) Oral two times a day  senna 2 Tablet(s) Oral at bedtime  sodium chloride 0.65% Nasal 1 Spray(s) Both Nostrils three times a day  ticagrelor 90 milliGRAM(s) Oral two times a day      LABS:  05-13    140  |  103  |  72<H>  ----------------------------<  160<H>  3.6   |  21<L>  |  2.09<H>    Ca    8.6      13 May 2019 06:10  Phos  3.1     05-13  Mg     2.3     05-13    TPro  7.4  /  Alb  3.7  /  TBili  0.4  /  DBili      /  AST  19  /  ALT  23  /  AlkPhos  69  05-13    Creatinine Trend: 2.09 <--, 2.37 <--, 2.31 <--, 2.37 <--, 2.36 <--, 2.40 <--, 2.56 <--, 2.41 <--    Phosphorus Level, Serum: 3.1 mg/dL (05-13 @ 06:10)  Albumin, Serum: 3.7 g/dL (05-13 @ 06:10)                              8.1    10.18 )-----------( 304      ( 13 May 2019 06:10 )             26.1     Urine Studies:  Urinalysis - [05-10-19 @ 09:30]      Color LIGHT YELLOW / Appearance CLEAR / SG 1.010 / pH 6.0      Gluc NEGATIVE / Ketone NEGATIVE  / Bili NEGATIVE / Urobili NORMAL       Blood NEGATIVE / Protein NEGATIVE / Leuk Est NEGATIVE / Nitrite NEGATIVE      RBC  / WBC  / Hyaline  / Gran  / Sq Epi  / Non Sq Epi  / Bacteria       .4 (Ca --)      [04-25-19 @ 05:45]   --  PTH 43.55 (Ca --)      [09-10-18 @ 07:15]   --  PTH 36.60 (Ca --)      [09-08-18 @ 03:15]   --  Vitamin D (25OH) 25.9      [05-01-19 @ 04:00]  HbA1c 7.3      [04-25-19 @ 05:45]  TSH 3.44      [05-07-19 @ 07:00]  Lipid: chol 127, , HDL 22, LDL 67      [04-24-19 @ 12:21]    HCV 0.06, Nonreactive Hepatitis C AB  S/CO Ratio                        Interpretation  < 1.00                                   Non-Reactive  1.00 - 4.99                         Weakly-Reactive  > 5.00                                Reactive  Non-Reactive: A person with a non-reactive HCV antibody  result is considered uninfected.  No further action is  needed unless recent infection is suspected.  In these  cases, consider repeat testing later to detect  seroconversion..  Weakly-Reactive: HCV antibody test is abnormal, HCV RNA  Qualitative test will follow.  Reactive: HCV antibody test is abnormal, HCV RNA  Qualitative test will follow.  Note: HCV antibody testing is performed on the [employer] system.      [04-25-19 @ 05:45]      RADIOLOGY & ADDITIONAL STUDIES:

## 2019-05-13 NOTE — PROGRESS NOTE ADULT - PROBLEM SELECTOR PLAN 3
-Continue ASA and brilinta and lipitor  [facility] 5/9/19- multiple vessel CAD, cont medical therapy.

## 2019-05-13 NOTE — PROGRESS NOTE ADULT - ATTENDING COMMENTS
Will ask echo attg to perform measurements requested by structural heart team. Will get mitral clip eval as outpatient.  Decrease hydralazine to 10 tid.  Physical therapy eval.  Continue milrinone and Bumex gtts.

## 2019-05-13 NOTE — PROGRESS NOTE ADULT - ASSESSMENT
Assessment  DMT2: 73y Female with DM T2 with hyperglycemia, on basal bolus insulin, blood sugars still running high, eating full meals, she is still feeling weak.  CAD: on medications, stable, monitored.  Hypothyroidism: On Synthroid 50 mcg po daily, compliant with Synthroid intake, asymptomatic.  HTN: Controlled,  on antihypertensive medications.  SOB: On Tx, oxygen.  Obesity: No strict exercise routines, not on any weight loss program, neither on low calorie diet.          Jillian Banks MD  Cell: [phone]  Office: [phone]

## 2019-05-13 NOTE — PROGRESS NOTE ADULT - PROBLEM SELECTOR PLAN 2
Await decision on mitral clip.   Pt wants to go home w/o intervention, but family wants pt to be evaluated.   Will d/w Dr. Hay.

## 2019-05-13 NOTE — PROGRESS NOTE ADULT - SUBJECTIVE AND OBJECTIVE BOX
Patient seen and examined at bedside  tele significant for > 30 beats svt  daughter at bedside  Case discussed with medical team    HPI:  73YOF with hx of CAD s/p CABG, hypothyroidism, CKD, CHF, HTN, DM p/w worsening ZEE and sob. Patient usually can walk up a few steps before feeling sob, currently feeling sob after walking from living room to kitchen.  She is using 3 pillows to sleep. No cough, fevers. (+) CP, SS and constant, She experienced more ZEE for last two days.  She has no edema in lower extremety.  Last admission to [facility] was 11/18. (24 Apr 2019 10:21)      PAST MEDICAL & SURGICAL HISTORY:  GERD (gastroesophageal reflux disease)  CAD (coronary artery disease): s/p CABG  Hypothyroidism  Hyperlipidemia  Diabetes mellitus, type 2  S/P CABG (coronary artery bypass graft)      azithromycin (Hives; Pruritus)       MEDICATIONS  (STANDING):  aspirin enteric coated 81 milliGRAM(s) Oral daily  atorvastatin 40 milliGRAM(s) Oral at bedtime  buDESOnide 160 MICROgram(s)/formoterol 4.5 MICROgram(s) Inhaler 2 Puff(s) Inhalation two times a day  buMETAnide Infusion 0.5 mG/Hr (2.5 mL/Hr) IV Continuous <Continuous>  chlorhexidine 4% Liquid 1 Application(s) Topical <User Schedule>  dextrose 5%. 1000 milliLiter(s) (50 mL/Hr) IV Continuous <Continuous>  dextrose 50% Injectable 12.5 Gram(s) IV Push once  dextrose 50% Injectable 25 Gram(s) IV Push once  dextrose 50% Injectable 25 Gram(s) IV Push once  docusate sodium 100 milliGRAM(s) Oral two times a day  ergocalciferol 16786 Unit(s) Oral <User Schedule>  gabapentin 100 milliGRAM(s) Oral two times a day  hydrALAZINE 25 milliGRAM(s) Oral three times a day  influenza   Vaccine 0.5 milliLiter(s) IntraMuscular once  insulin glargine Injectable (LANTUS) 45 Unit(s) SubCutaneous at bedtime  insulin lispro (HumaLOG) corrective regimen sliding scale   SubCutaneous three times a day before meals  insulin lispro (HumaLOG) corrective regimen sliding scale   SubCutaneous at bedtime  insulin lispro Injectable (HumaLOG) 4 Unit(s) SubCutaneous three times a day with meals  isosorbide   dinitrate Tablet (ISORDIL) 10 milliGRAM(s) Oral three times a day  levothyroxine 50 MICROGram(s) Oral daily  milrinone Infusion 0.25 MICROgram(s)/kG/Min (4.275 mL/Hr) IV Continuous <Continuous>  montelukast 10 milliGRAM(s) Oral daily  pantoprazole    Tablet 40 milliGRAM(s) Oral before breakfast  polyethylene glycol 3350 17 Gram(s) Oral two times a day  senna 2 Tablet(s) Oral at bedtime  sodium chloride 0.65% Nasal 1 Spray(s) Both Nostrils three times a day  ticagrelor 90 milliGRAM(s) Oral two times a day    MEDICATIONS  (PRN):  acetaminophen   Tablet .. 650 milliGRAM(s) Oral every 6 hours PRN Mild Pain (1 - 3), Moderate Pain (4 - 6), Severe Pain (7 - 10)  ALPRAZolam 0.25 milliGRAM(s) Oral every 12 hours PRN panic attack  dextrose 40% Gel 15 Gram(s) Oral once PRN Blood Glucose LESS THAN 70 milliGRAM(s)/deciliter  glucagon  Injectable 1 milliGRAM(s) IntraMuscular once PRN Glucose LESS THAN 70 milligrams/deciliter      REVIEW OF SYSTEMS:  CONSTITUTIONAL: (+) malaise.   EYES: No acute change in vision   ENT:  No tinnitus  NECK: No stiffness  RESPIRATORY: zee. sob. No hemoptysis  CARDIOVASCULAR: No chest pain, palpitations, syncope  GASTROINTESTINAL: No hematemesis, diarrhea, melena, or hematochezia.  GENITOURINARY: No hematuria  NEUROLOGICAL: No headaches  LYMPH Nodes: No enlarged glands  ENDOCRINE: No heat or cold intolerance	    T(C): 36.5 (05-13-19 @ 07:56), Max: 36.9 (05-12-19 @ 16:54)  HR: 82 (05-13-19 @ 07:56) (82 - 91)  BP: 104/47 (05-13-19 @ 07:56) (97/48 - 111/53)  RR: 17 (05-13-19 @ 07:56) (17 - 22)  SpO2: 100% (05-13-19 @ 07:56) (100% - 100%)    PHYSICAL EXAMINATION:   Constitutional: WD, NAD  HEENT: NC, AT  Neck:  Supple  Respiratory:  Adequate airflow b/l. Not using accessory muscles of respiration.  Cardiovascular: sys murmur stable. S1 & S2 intact, no R/G, 2+ radial pulses b/l  Gastrointestinal: Soft, NT, ND, normoactive b.s., no organomegaly/RT/rigidity  Extremities: WWP  Neurological:  Alert and awake.  No acute focal motor deficits. Crude sensation intact.     Labs and imaging reviewed    LABS:                        8.1    10.18 )-----------( 304      ( 13 May 2019 06:10 )             26.1     05-13    140  |  103  |  72<H>  ----------------------------<  160<H>  3.6   |  21<L>  |  2.09<H>    Ca    8.6      13 May 2019 06:10  Phos  3.1     05-13  Mg     2.3     05-13    TPro  7.4  /  Alb  3.7  /  TBili  0.4  /  DBili  x   /  AST  19  /  ALT  23  /  AlkPhos  69  05-13    CARDIAC MARKERS ( 12 May 2019 03:00 )  x     / x     / 58 u/L / 3.36 ng/mL / x          PT/INR - ( 12 May 2019 03:00 )   PT: 12.4 SEC;   INR: 1.11          PTT - ( 12 May 2019 03:00 )  PTT:36.2 SEC    CAPILLARY BLOOD GLUCOSE      POCT Blood Glucose.: 174 mg/dL (13 May 2019 08:32)  POCT Blood Glucose.: 273 mg/dL (12 May 2019 21:29)  POCT Blood Glucose.: 369 mg/dL (12 May 2019 17:21)  POCT Blood Glucose.: 263 mg/dL (12 May 2019 12:40)        LIVER FUNCTIONS - ( 13 May 2019 06:10 )  Alb: 3.7 g/dL / Pro: 7.4 g/dL / ALK PHOS: 69 u/L / ALT: 23 u/L / AST: 19 u/L / GGT: x               RADIOLOGY & ADDITIONAL STUDIES:

## 2019-05-13 NOTE — PROGRESS NOTE ADULT - SUBJECTIVE AND OBJECTIVE BOX
Patient is a 73y old  Female who presents with a chief complaint of sob (13 May 2019 11:16)      Any change in ROS: pt is same: now on ionotropes as well as diuretics     MEDICATIONS  (STANDING):  aspirin enteric coated 81 milliGRAM(s) Oral daily  atorvastatin 40 milliGRAM(s) Oral at bedtime  buDESOnide 160 MICROgram(s)/formoterol 4.5 MICROgram(s) Inhaler 2 Puff(s) Inhalation two times a day  buMETAnide Infusion 0.5 mG/Hr (2.5 mL/Hr) IV Continuous <Continuous>  chlorhexidine 4% Liquid 1 Application(s) Topical <User Schedule>  dextrose 5%. 1000 milliLiter(s) (50 mL/Hr) IV Continuous <Continuous>  dextrose 50% Injectable 12.5 Gram(s) IV Push once  dextrose 50% Injectable 25 Gram(s) IV Push once  dextrose 50% Injectable 25 Gram(s) IV Push once  docusate sodium 100 milliGRAM(s) Oral two times a day  ergocalciferol 96900 Unit(s) Oral <User Schedule>  gabapentin 100 milliGRAM(s) Oral two times a day  hydrALAZINE 25 milliGRAM(s) Oral three times a day  influenza   Vaccine 0.5 milliLiter(s) IntraMuscular once  insulin glargine Injectable (LANTUS) 45 Unit(s) SubCutaneous at bedtime  insulin lispro (HumaLOG) corrective regimen sliding scale   SubCutaneous three times a day before meals  insulin lispro (HumaLOG) corrective regimen sliding scale   SubCutaneous at bedtime  insulin lispro Injectable (HumaLOG) 4 Unit(s) SubCutaneous three times a day with meals  isosorbide   dinitrate Tablet (ISORDIL) 10 milliGRAM(s) Oral three times a day  levothyroxine 50 MICROGram(s) Oral daily  milrinone Infusion 0.25 MICROgram(s)/kG/Min (4.275 mL/Hr) IV Continuous <Continuous>  montelukast 10 milliGRAM(s) Oral daily  pantoprazole    Tablet 40 milliGRAM(s) Oral before breakfast  polyethylene glycol 3350 17 Gram(s) Oral two times a day  senna 2 Tablet(s) Oral at bedtime  sodium chloride 0.65% Nasal 1 Spray(s) Both Nostrils three times a day  ticagrelor 90 milliGRAM(s) Oral two times a day    MEDICATIONS  (PRN):  acetaminophen   Tablet .. 650 milliGRAM(s) Oral every 6 hours PRN Mild Pain (1 - 3), Moderate Pain (4 - 6), Severe Pain (7 - 10)  ALPRAZolam 0.25 milliGRAM(s) Oral every 12 hours PRN panic attack  dextrose 40% Gel 15 Gram(s) Oral once PRN Blood Glucose LESS THAN 70 milliGRAM(s)/deciliter  glucagon  Injectable 1 milliGRAM(s) IntraMuscular once PRN Glucose LESS THAN 70 milligrams/deciliter    Vital Signs Last 24 Hrs  T(C): 36.7 (13 May 2019 11:56), Max: 36.9 (12 May 2019 16:54)  T(F): 98.1 (13 May 2019 11:56), Max: 98.4 (12 May 2019 16:54)  HR: 88 (13 May 2019 11:56) (82 - 91)  BP: 145/71 (13 May 2019 11:56) (97/49 - 145/71)  BP(mean): --  RR: 17 (13 May 2019 11:56) (17 - 22)  SpO2: 100% (13 May 2019 11:56) (100% - 100%)    I&O's Summary    12 May 2019 07:01  -  13 May 2019 07:00  --------------------------------------------------------  IN: 832.5 mL / OUT: 2140 mL / NET: -1307.5 mL          Physical Exam:   GENERAL: NAD, well-groomed, well-developed  HEENT: MOHSEN/   Atraumatic, Normocephalic  ENMT: No tonsillar erythema, exudates, or enlargement; Moist mucous membranes, Good dentition, No lesions  NECK: Supple, No JVD, Normal thyroid  CHEST/LUNG: Bilateral crackles++  CVS: Regular rate and rhythm; No murmurs, rubs, or gallops  GI: : Soft, Nontender, Nondistended; Bowel sounds present  NERVOUS SYSTEM:  Alert & Oriented X3  EXTREMITIES:  + edema  LYMPH: No lymphadenopathy noted  SKIN: No rashes or lesions  ENDOCRINOLOGY: No Thyromegaly  PSYCH: Appropriate    Labs:  23  CARDIAC MARKERS ( 12 May 2019 03:00 )  x     / x     / 58 u/L / 3.36 ng/mL / x                                8.1    10.18 )-----------( 304      ( 13 May 2019 06:10 )             26.1                         8.0    11.66 )-----------( 269      ( 12 May 2019 03:00 )             25.2                         8.6    11.32 )-----------( 301      ( 11 May 2019 06:25 )             27.0                         8.3    11.52 )-----------( 276      ( 10 May 2019 23:50 )             25.6                         6.9    13.15 )-----------( 308      ( 10 May 2019 06:00 )             22.1     05-13    140  |  103  |  72<H>  ----------------------------<  160<H>  3.6   |  21<L>  |  2.09<H>  05-12    134<L>  |  99  |  79<H>  ----------------------------<  201<H>  3.5   |  19<L>  |  2.37<H>  05-11    139  |  102  |  79<H>  ----------------------------<  106<H>  3.7   |  21<L>  |  2.31<H>  05-10    138  |  103  |  85<H>  ----------------------------<  62<L>  3.8   |  19<L>  |  2.37<H>    Ca    8.6      13 May 2019 06:10  Ca    8.1<L>      12 May 2019 03:00  Phos  3.1     05-13  Phos  3.8     05-12  Mg     2.3     05-13  Mg     2.2     05-12    TPro  7.4  /  Alb  3.7  /  TBili  0.4  /  DBili  x   /  AST  19  /  ALT  23  /  AlkPhos  69  05-13  TPro  7.4  /  Alb  3.80  /  TBili  0.5  /  DBili  x   /  AST  22  /  ALT  28  /  AlkPhos  74  05-11    CAPILLARY BLOOD GLUCOSE      POCT Blood Glucose.: 254 mg/dL (13 May 2019 12:19)  POCT Blood Glucose.: 174 mg/dL (13 May 2019 08:32)  POCT Blood Glucose.: 273 mg/dL (12 May 2019 21:29)  POCT Blood Glucose.: 369 mg/dL (12 May 2019 17:21)      LIVER FUNCTIONS - ( 13 May 2019 06:10 )  Alb: 3.7 g/dL / Pro: 7.4 g/dL / ALK PHOS: 69 u/L / ALT: 23 u/L / AST: 19 u/L / GGT: x           PT/INR - ( 12 May 2019 03:00 )   PT: 12.4 SEC;   INR: 1.11          PTT - ( 12 May 2019 03:00 )  PTT:36.2 SEC    D-Dimer Assay, Quantitative: 243 ng/mL (05-08 @ 23:58)  Procalcitonin, Serum: 0.34 ng/mL (05-13 @ 06:10)  Procalcitonin, Serum: 0.38 ng/mL (05-12 @ 18:25)      < from: CT Chest No Cont (05.13.19 @ 10:34) >    8mm fissural nodule within the left midlung is unchanged (series 6 image   50).    PLEURA: No pleural effusion.    MEDIASTINUM AND ADIA: No lymphadenopathy.    VESSELS: Mild atherosclerotic calcifications at the aortic arch and   origins of great vessels.    HEART: Cardiomegaly. No pericardial effusion. Coronary artery   calcifications. CABG.    CHEST WALL AND LOWER NECK: Status post median sternotomy. Paraspinal   musculature fatty atrophy.    VISUALIZED UPPER ABDOMEN: Within normal limits.    BONES: Within normal limits.    IMPRESSION:     Patchy peribronchovascular-central groundglass opacities predominantly   within the mid to lower lungs may represent interstitial edema and are   decreased from prior exams.                SANDHYA BERUMEN M.D., RADIOLOGY RESIDENT    < end of copied text >    RECENT CULTURES:        RESPIRATORY CULTURES:          Studies  Chest X-RAY  CT SCAN Chest   Venous Dopplers: LE:   CT Abdomen  Others

## 2019-05-13 NOTE — PROGRESS NOTE ADULT - ASSESSMENT
73YOF w/o h/o CAD, s/p CABG CHF adm to [facility] for Acute on Chronic systolic and diastolic HF

## 2019-05-13 NOTE — CONSULT NOTE ADULT - CONSULT REQUESTED DATE/TIME
13-May-2019 15:36
25-Apr-2019 13:46
01-May-2019 15:34
06-May-2019 10:34
11-May-2019 12:07
12-May-2019 17:22
24-Apr-2019 16:28
25-Apr-2019 14:00
25-Apr-2019 14:43
26-Apr-2019 17:37
25-Apr-2019 12:29

## 2019-05-13 NOTE — PROGRESS NOTE ADULT - PROBLEM SELECTOR PLAN 1
She is on diuretics: awaiting TRANSFER TO [facility]: RECED EXTRAS DOSES OF BUMEX TODAY  5/10: pt is in adhf: cath noted: being aggressively diuresed: Most of her breathing is secondary to chf: May use bipap for resp support if required:  5/13: events noted: in chf: has severe MR: on milrinone as wellas diuretics: she still seems SOB: Now family wants evaluation for surgery : She has not been wheezing: Would c ont [facility]!

## 2019-05-13 NOTE — PROGRESS NOTE ADULT - ASSESSMENT
74 y/o female with pmhx of HTN, DM, CAD s/p CABG, HFrEF (LVEF 25%) severe MR, severe pulm HTN comes in with cough and SOB.   CT chest showed ground glass opacities, patient was started on IV zithromax, developed rash, now on Levaquin She was admitted to telemetry was given a dose of lopressor, patient became hypotensive and went into respiratory distress, patient was then moved to CCU. HF was consulted to help manage this patient who is SOB and is hypotensive. She admits to SOB at rest, orthopnea, cough. Denies fevers and sick contacts. No CP, palpitations, dizziness.   5/9/19 RHC showed RA 14, V 71/17, PA 69/20/41, PCWP 27, PA sat 39%, CO3.36, CI 2.37, SVR 1825. PVR 4.17. Pt was started on Bumex gtt and milirinone gtt at 0.25 mcg/kg/min.

## 2019-05-13 NOTE — PROGRESS NOTE ADULT - SUBJECTIVE AND OBJECTIVE BOX
Medications:  acetaminophen   Tablet .. 650 milliGRAM(s) Oral every 6 hours PRN  ALPRAZolam 0.25 milliGRAM(s) Oral every 12 hours PRN  aspirin enteric coated 81 milliGRAM(s) Oral daily  atorvastatin 40 milliGRAM(s) Oral at bedtime  buDESOnide 160 MICROgram(s)/formoterol 4.5 MICROgram(s) Inhaler 2 Puff(s) Inhalation two times a day  buMETAnide Infusion 0.5 mG/Hr IV Continuous <Continuous>  chlorhexidine 4% Liquid 1 Application(s) Topical <User Schedule>  dextrose 40% Gel 15 Gram(s) Oral once PRN  dextrose 5%. 1000 milliLiter(s) IV Continuous <Continuous>  dextrose 50% Injectable 12.5 Gram(s) IV Push once  dextrose 50% Injectable 25 Gram(s) IV Push once  dextrose 50% Injectable 25 Gram(s) IV Push once  docusate sodium 100 milliGRAM(s) Oral two times a day  ergocalciferol 90561 Unit(s) Oral <User Schedule>  gabapentin 100 milliGRAM(s) Oral two times a day  glucagon  Injectable 1 milliGRAM(s) IntraMuscular once PRN  hydrALAZINE 25 milliGRAM(s) Oral three times a day  influenza   Vaccine 0.5 milliLiter(s) IntraMuscular once  insulin glargine Injectable (LANTUS) 45 Unit(s) SubCutaneous at bedtime  insulin lispro (HumaLOG) corrective regimen sliding scale   SubCutaneous three times a day before meals  insulin lispro (HumaLOG) corrective regimen sliding scale   SubCutaneous at bedtime  insulin lispro Injectable (HumaLOG) 4 Unit(s) SubCutaneous three times a day with meals  isosorbide   dinitrate Tablet (ISORDIL) 10 milliGRAM(s) Oral three times a day  levothyroxine 50 MICROGram(s) Oral daily  milrinone Infusion 0.25 MICROgram(s)/kG/Min IV Continuous <Continuous>  montelukast 10 milliGRAM(s) Oral daily  pantoprazole    Tablet 40 milliGRAM(s) Oral before breakfast  polyethylene glycol 3350 17 Gram(s) Oral two times a day  senna 2 Tablet(s) Oral at bedtime  sodium chloride 0.65% Nasal 1 Spray(s) Both Nostrils three times a day  ticagrelor 90 milliGRAM(s) Oral two times a day      Vitals:  Vital Signs Last 24 Hrs  T(C): 36.5 (13 May 2019 07:56), Max: 36.9 (12 May 2019 16:54)  T(F): 97.7 (13 May 2019 07:56), Max: 98.4 (12 May 2019 16:54)  HR: 82 (13 May 2019 07:56) (82 - 91)  BP: 104/47 (13 May 2019 07:56) (97/48 - 111/53)  BP(mean): --  RR: 17 (13 May 2019 07:56) (17 - 22)  SpO2: 100% (13 May 2019 07:56) (100% - 100%)    Daily     Daily Weight in k.7 (13 May 2019 06:03)    I&O's Detail    12 May 2019 07:01  -  13 May 2019 07:00  --------------------------------------------------------  IN:    bumetanide Infusion: 27.5 mL    milrinone  Infusion: 47 mL    Oral Fluid: 758 mL  Total IN: 832.5 mL    OUT:    Intermittent Catheterization - Urethral: 2140 mL  Total OUT: 2140 mL    Total NET: -1307.5 mL          Physical Exam:     General: No distress. Comfortable.  HEENT: EOM intact.  Neck: Neck supple. JVP not elevated. No masses  Chest: Clear to auscultation bilaterally  CV: Normal S1 and S2. No murmurs, rub, or gallops. Radial pulses normal.  Abdomen: Soft, non-distended, non-tender  Skin: No rashes or skin breakdown  Neurology: Alert and oriented times three. Sensation intact  Psych: Affect normal    Labs:                        8.1    10.18 )-----------( 304      ( 13 May 2019 06:10 )             26.1     05-13    140  |  103  |  72<H>  ----------------------------<  160<H>  3.6   |  21<L>  |  2.09<H>    Ca    8.6      13 May 2019 06:10  Phos  3.1     05-13  Mg     2.3     05-13    TPro  7.4  /  Alb  3.7  /  TBili  0.4  /  DBili  x   /  AST  19  /  ALT  23  /  AlkPhos  69  05-    PT/INR - ( 12 May 2019 03:00 )   PT: 12.4 SEC;   INR: 1.11          PTT - ( 12 May 2019 03:00 )  PTT:36.2 SEC  CARDIAC MARKERS ( 12 May 2019 03:00 )  x     / x     / 58 u/L / 3.36 ng/mL / x Patient seen and examined. She is asking to go home. Daughter in law by bedside states they want to proceed with mitral clip evaluation.   Currently she denies SOB at rest, but has not ambulated much b/c of weakness.   RRT on  noted for SVT and SOB.     Medications:  acetaminophen   Tablet .. 650 milliGRAM(s) Oral every 6 hours PRN  ALPRAZolam 0.25 milliGRAM(s) Oral every 12 hours PRN  aspirin enteric coated 81 milliGRAM(s) Oral daily  atorvastatin 40 milliGRAM(s) Oral at bedtime  buDESOnide 160 MICROgram(s)/formoterol 4.5 MICROgram(s) Inhaler 2 Puff(s) Inhalation two times a day  buMETAnide Infusion 0.5 mG/Hr IV Continuous <Continuous>  chlorhexidine 4% Liquid 1 Application(s) Topical <User Schedule>  dextrose 40% Gel 15 Gram(s) Oral once PRN  dextrose 5%. 1000 milliLiter(s) IV Continuous <Continuous>  dextrose 50% Injectable 12.5 Gram(s) IV Push once  dextrose 50% Injectable 25 Gram(s) IV Push once  dextrose 50% Injectable 25 Gram(s) IV Push once  docusate sodium 100 milliGRAM(s) Oral two times a day  ergocalciferol 59789 Unit(s) Oral <User Schedule>  gabapentin 100 milliGRAM(s) Oral two times a day  glucagon  Injectable 1 milliGRAM(s) IntraMuscular once PRN  hydrALAZINE 25 milliGRAM(s) Oral three times a day  influenza   Vaccine 0.5 milliLiter(s) IntraMuscular once  insulin glargine Injectable (LANTUS) 45 Unit(s) SubCutaneous at bedtime  insulin lispro (HumaLOG) corrective regimen sliding scale   SubCutaneous three times a day before meals  insulin lispro (HumaLOG) corrective regimen sliding scale   SubCutaneous at bedtime  insulin lispro Injectable (HumaLOG) 4 Unit(s) SubCutaneous three times a day with meals  isosorbide   dinitrate Tablet (ISORDIL) 10 milliGRAM(s) Oral three times a day  levothyroxine 50 MICROGram(s) Oral daily  milrinone Infusion 0.25 MICROgram(s)/kG/Min IV Continuous <Continuous>  montelukast 10 milliGRAM(s) Oral daily  pantoprazole    Tablet 40 milliGRAM(s) Oral before breakfast  polyethylene glycol 3350 17 Gram(s) Oral two times a day  senna 2 Tablet(s) Oral at bedtime  sodium chloride 0.65% Nasal 1 Spray(s) Both Nostrils three times a day  ticagrelor 90 milliGRAM(s) Oral two times a day      Vitals:  Vital Signs Last 24 Hrs  T(C): 36.5 (13 May 2019 07:56), Max: 36.9 (12 May 2019 16:54)  T(F): 97.7 (13 May 2019 07:56), Max: 98.4 (12 May 2019 16:54)  HR: 82 (13 May 2019 07:56) (82 - 91)  BP: 104/47 (13 May 2019 07:56) (97/48 - 111/53)  BP(mean): --  RR: 17 (13 May 2019 07:56) (17 - 22)  SpO2: 100% (13 May 2019 07:56) (100% - 100%)    Daily     Daily Weight in k.7 (13 May 2019 06:03)    I&O's Detail    12 May 2019 07:01  -  13 May 2019 07:00  --------------------------------------------------------  IN:    bumetanide Infusion: 27.5 mL    milrinone  Infusion: 47 mL    Oral Fluid: 758 mL  Total IN: 832.5 mL    OUT:    Intermittent Catheterization - Urethral: 2140 mL  Total OUT: 2140 mL    Total NET: -1307.5 mL          Physical Exam:     General: No distress. Comfortable.  HEENT: EOM intact.  Neck: Neck supple. JVP elevated. No masses  Chest: Clear to auscultation bilaterally  CV: S1 and S2 RRR. No murmurs, rub, or gallops. Radial pulses normal. No LE edema b/l, warm b/l.   Abdomen: Soft, non-distended, non-tender  Skin: No rashes or skin breakdown  Neurology: Alert and oriented times three. Sensation intact  Psych: Affect normal    Labs:                        8.1    10.18 )-----------( 304      ( 13 May 2019 06:10 )             26.1     05-13    140  |  103  |  72<H>  ----------------------------<  160<H>  3.6   |  21<L>  |  2.09<H>    Ca    8.6      13 May 2019 06:10  Phos  3.1       Mg     2.3         TPro  7.4  /  Alb  3.7  /  TBili  0.4  /  DBili  x   /  AST  19  /  ALT  23  /  AlkPhos  69      PT/INR - ( 12 May 2019 03:00 )   PT: 12.4 SEC;   INR: 1.11          PTT - ( 12 May 2019 03:00 )  PTT:36.2 SEC  CARDIAC MARKERS ( 12 May 2019 03: )  x     / x     / 58 u/L / 3.36 ng/mL / x        < from: Transesophageal Echocardiogram w/o TTE (19 @ 18:25) >  OBSERVATIONS:  Mitral Valve: Mitral annular calcification and calcified  mitral leaflets which are tethered. The posterior mitral  leaflet is particularly tethered.  Severe mitral  regurgitation which originates along the coaptation line.  Aortic Root: Normal aortic root, aortic arch and descending  thoracic aorta.  Aortic Valve: Calcified trileaflet aortic valve with normal  opening.  Left Atrium: Left atrial enlargement. Left atrial appendage  could not be visualized, unclear if it was ligated during  prior CABG.  Left Ventricle: Severe  left ventricular systolic  dysfunction. Left ventricular enlargement.  Right Heart: Normal right atrium. Right ventricular  enlargement with decreased right ventricular systolic  function. Normal tricuspid valve. Normal pulmonic valve.  Pericardium/PleuraNormal pericardium with no pericardial  effusion.  Hemodynamic: Agitated saline injection demonstrates  evidence of a patent foramen ovale with left to right flow.  ------------------------------------------------------------------------  CONCLUSIONS:  1. Mitral annular calcification and calcified mitral  leaflets which are tethered. The posterior mitral leaflet  is particularly tethered.  Severe mitral regurgitation  which originates along the coaptation line.  2. Calcified trileaflet aortic valve with normal opening.  3. Left atrial enlargement. Left atrial appendage could not  be visualized, unclear if it was ligated during prior CABG.    4. Left ventricular enlargement.  5. Severe  left ventricular systolic dysfunction.  6. Right ventricular enlargement with decreased right  ventricular systolic function.  7. Agitated saline injection demonstrates evidence of a  patent foramen ovale with left to right flow.  ADDENDUM 5/3/2019: Systolic flow reversal seen in the right  upper pulmonary vein. Technically very difficult study.  ------------------------------------------------------------------------  Revised on  5/3/2019 - 10:12:35 by ELIDIA Viveros  Confirmed on  5/3/2019 - 10:13:26 by ELIDIA Viveros  --------------------------------------------------    < end of copied text >

## 2019-05-13 NOTE — CONSULT NOTE ADULT - ASSESSMENT
73F with history of CAD s/p CABG, systolic CHF (EF 20%), CKD III, who p/w sob and pino. Initially manifesting with lab data and features of ACS she was treated with guideline therapy and also for acute decompensated systolic heart failure. Hospital course complicated by hypotension in the setting of beta blocker administration during acute decompensated HF. She was transition to the CCU for management and eventually cath revealing volume overload as well as 100% occlusion in LAD and RCA. HF service was involved and attempts were made to optimize her medical regimen and diurese her. Her assessment was notable for ischemic severe MR and this was evaluated by LV revealing severe functional MR. Course was further complicated by gouty flare, PNA and anemia.    Patient was eventually transferred to telemetry where she has several episodes of SVT over the course of the past few day prompting EP consultation.     #SVT - several episode triggered by APC suggesting a re-entrant circuit 73F with history of CAD s/p CABG, systolic CHF (EF 20%), CKD III, who p/w sob and pino. Initially manifesting with lab data and features of ACS she was treated with guideline therapy and also for acute decompensated systolic heart failure. Hospital course complicated by hypotension in the setting of beta blocker administration during acute decompensated HF. She was transition to the CCU for management and eventually cath revealing volume overload as well as 100% occlusion in LAD and RCA. HF service was involved and attempts were made to optimize her medical regimen and diurese her. Her assessment was notable for ischemic severe MR and this was evaluated by LV revealing severe functional MR. Course was further complicated by gouty flare, PNA and anemia.    Patient was eventually transferred to telemetry where she has several episodes of SVT over the course of the past few day prompting EP consultation.     #SVT - several episode triggered by APC suggesting a re-entrant circuit e.g. AVNRT  - if no objection from HF would increase metoprolol succinate to 25mg and up-titrate as tolerated to minimize ectopy    Given patient is on milrinone now would not pursue ICD implantation. Possible ablation if unable to medically manage.     JOHNNY Rausch MD  Cardiology Fellow (EP Service)  Cell: [phone] (until 5PM) / [phone] (after 5PM)  EP Charge: [id number]  Please check [email or web address] (Username: Paolo) for daily cardiology fellow schedule and contact information. 73F with history of CAD s/p CABG, systolic CHF (EF 20%), CKD III, who p/w sob and pino. Initially manifesting with lab data and features of ACS she was treated with guideline therapy and also for acute decompensated systolic heart failure. Hospital course complicated by hypotension in the setting of beta blocker administration during acute decompensated HF. She was transition to the CCU for management and eventually cath revealing volume overload as well as 100% occlusion in LAD and RCA. HF service was involved and attempts were made to optimize her medical regimen and diurese her. Her assessment was notable for ischemic severe MR and this was evaluated by LV revealing severe functional MR. Course was further complicated by gouty flare, PNA and anemia.    Patient was eventually transferred to telemetry where she has several episodes of SVT over the course of the past few day prompting EP consultation.     #SVT - several episode triggered by APC suggesting a re-entrant circuit e.g. AVNRT. Asymptomatic, however, had an RRT where vagal maneuvers terminated the arrythmia.  - if no objections from HF would increase metoprolol succinate to 25mg and up-titrate as tolerated to minimize ectopy  - discontinuation of milrinone will likely help as well    Given patient is requiring milrinone now would not pursue ICD implantation. Possible ablation if unable to medically manage.     JOHNNY Rausch MD  Cardiology Fellow (EP Service)  Cell: [phone] (until 5PM) / [phone] (after 5PM)  EP Charge: [id number]  Please check [email or web address] (Username: Paolo) for daily cardiology fellow schedule and contact information.

## 2019-05-13 NOTE — PROGRESS NOTE ADULT - SUBJECTIVE AND OBJECTIVE BOX
Chief complaint  Patient is a 73y old  Female who presents with a chief complaint of sob (13 May 2019 17:02)   Review of systems  Patient in bed, looks comfortable, no fever, no hypoglycemia.    Labs and Fingersticks  CAPILLARY BLOOD GLUCOSE      POCT Blood Glucose.: 373 mg/dL (13 May 2019 17:13)  POCT Blood Glucose.: 254 mg/dL (13 May 2019 12:19)  POCT Blood Glucose.: 174 mg/dL (13 May 2019 08:32)  POCT Blood Glucose.: 273 mg/dL (12 May 2019 21:29)      Anion Gap, Serum: 16 <H> (05-13 @ 06:10)  Anion Gap, Serum: 16 <H> (05-12 @ 03:00)      Calcium, Total Serum: 8.6 (05-13 @ 06:10)  Calcium, Total Serum: 8.1 <L> (05-12 @ 03:00)  Albumin, Serum: 3.7 (05-13 @ 06:10)    Alanine Aminotransferase (ALT/SGPT): 23 (05-13 @ 06:10)  Alkaline Phosphatase, Serum: 69 (05-13 @ 06:10)  Aspartate Aminotransferase (AST/SGOT): 19 (05-13 @ 06:10)        05-13    140  |  103  |  72<H>  ----------------------------<  160<H>  3.6   |  21<L>  |  2.09<H>    Ca    8.6      13 May 2019 06:10  Phos  3.1     05-13  Mg     2.3     05-13    TPro  7.4  /  Alb  3.7  /  TBili  0.4  /  DBili  x   /  AST  19  /  ALT  23  /  AlkPhos  69  05-13                        8.1    10.18 )-----------( 304      ( 13 May 2019 06:10 )             26.1     Medications  MEDICATIONS  (STANDING):  aspirin enteric coated 81 milliGRAM(s) Oral daily  atorvastatin 40 milliGRAM(s) Oral at bedtime  buDESOnide 160 MICROgram(s)/formoterol 4.5 MICROgram(s) Inhaler 2 Puff(s) Inhalation two times a day  buMETAnide Infusion 0.5 mG/Hr (2.5 mL/Hr) IV Continuous <Continuous>  chlorhexidine 4% Liquid 1 Application(s) Topical <User Schedule>  dextrose 5%. 1000 milliLiter(s) (50 mL/Hr) IV Continuous <Continuous>  dextrose 50% Injectable 12.5 Gram(s) IV Push once  dextrose 50% Injectable 25 Gram(s) IV Push once  dextrose 50% Injectable 25 Gram(s) IV Push once  docusate sodium 100 milliGRAM(s) Oral two times a day  ergocalciferol 81266 Unit(s) Oral <User Schedule>  gabapentin 100 milliGRAM(s) Oral two times a day  hydrALAZINE 10 milliGRAM(s) Oral three times a day  influenza   Vaccine 0.5 milliLiter(s) IntraMuscular once  insulin glargine Injectable (LANTUS) 55 Unit(s) SubCutaneous at bedtime  insulin lispro (HumaLOG) corrective regimen sliding scale   SubCutaneous three times a day before meals  insulin lispro (HumaLOG) corrective regimen sliding scale   SubCutaneous at bedtime  insulin lispro Injectable (HumaLOG) 10 Unit(s) SubCutaneous three times a day with meals  isosorbide   dinitrate Tablet (ISORDIL) 10 milliGRAM(s) Oral three times a day  levothyroxine 50 MICROGram(s) Oral daily  milrinone Infusion 0.25 MICROgram(s)/kG/Min (4.275 mL/Hr) IV Continuous <Continuous>  montelukast 10 milliGRAM(s) Oral daily  pantoprazole    Tablet 40 milliGRAM(s) Oral before breakfast  polyethylene glycol 3350 17 Gram(s) Oral two times a day  senna 2 Tablet(s) Oral at bedtime  sodium chloride 0.65% Nasal 1 Spray(s) Both Nostrils three times a day  ticagrelor 90 milliGRAM(s) Oral two times a day      Physical Exam  General: Patient comfortable in bed  Vital Signs Last 12 Hrs  T(F): 98.1 (05-13-19 @ 17:29), Max: 98.1 (05-13-19 @ 11:56)  HR: 88 (05-13-19 @ 17:29) (82 - 88)  BP: 104/46 (05-13-19 @ 17:29) (104/46 - 145/71)  BP(mean): --  RR: 17 (05-13-19 @ 17:29) (17 - 17)  SpO2: 100% (05-13-19 @ 17:29) (100% - 100%)  Neck: No palpable thyroid nodules.  CVS: S1S2, No murmurs  Respiratory: No wheezing, no crepitations  GI: Abdomen soft, bowel sounds positive  Musculoskeletal:  edema lower extremities.   Skin: No skin rashes, no ecchymosis    Diagnostics    Thyroid Stimulating Hormone, Serum: AM Sched. Collection: 07-May-2019 06:00 (05-06 @ 10:38)  Free Thyroxine, Serum: AM Sched. Collection: 07-May-2019 06:00 (05-06 @ 10:38)

## 2019-05-14 LAB
ANION GAP SERPL CALC-SCNC: 17 MMO/L — HIGH (ref 7–14)
ANION GAP SERPL CALC-SCNC: 19 MMO/L — HIGH (ref 7–14)
BUN SERPL-MCNC: 70 MG/DL — HIGH (ref 7–23)
BUN SERPL-MCNC: 70 MG/DL — HIGH (ref 7–23)
CALCIUM SERPL-MCNC: 9.4 MG/DL — SIGNIFICANT CHANGE UP (ref 8.4–10.5)
CALCIUM SERPL-MCNC: 9.7 MG/DL — SIGNIFICANT CHANGE UP (ref 8.4–10.5)
CHLORIDE SERPL-SCNC: 100 MMOL/L — SIGNIFICANT CHANGE UP (ref 98–107)
CHLORIDE SERPL-SCNC: 101 MMOL/L — SIGNIFICANT CHANGE UP (ref 98–107)
CO2 SERPL-SCNC: 18 MMOL/L — LOW (ref 22–31)
CO2 SERPL-SCNC: 22 MMOL/L — SIGNIFICANT CHANGE UP (ref 22–31)
CREAT SERPL-MCNC: 1.92 MG/DL — HIGH (ref 0.5–1.3)
CREAT SERPL-MCNC: 2.04 MG/DL — HIGH (ref 0.5–1.3)
GLUCOSE SERPL-MCNC: 171 MG/DL — HIGH (ref 70–99)
GLUCOSE SERPL-MCNC: 257 MG/DL — HIGH (ref 70–99)
HCT VFR BLD CALC: 29.4 % — LOW (ref 34.5–45)
HGB BLD-MCNC: 9.1 G/DL — LOW (ref 11.5–15.5)
MAGNESIUM SERPL-MCNC: 2.2 MG/DL — SIGNIFICANT CHANGE UP (ref 1.6–2.6)
MAGNESIUM SERPL-MCNC: 2.3 MG/DL — SIGNIFICANT CHANGE UP (ref 1.6–2.6)
MAGNESIUM SERPL-MCNC: 2.3 MG/DL — SIGNIFICANT CHANGE UP (ref 1.6–2.6)
MCHC RBC-ENTMCNC: 28.6 PG — SIGNIFICANT CHANGE UP (ref 27–34)
MCHC RBC-ENTMCNC: 31 % — LOW (ref 32–36)
MCV RBC AUTO: 92.5 FL — SIGNIFICANT CHANGE UP (ref 80–100)
NRBC # FLD: 0 K/UL — SIGNIFICANT CHANGE UP (ref 0–0)
PLATELET # BLD AUTO: 337 K/UL — SIGNIFICANT CHANGE UP (ref 150–400)
PMV BLD: 9.1 FL — SIGNIFICANT CHANGE UP (ref 7–13)
POTASSIUM SERPL-MCNC: 3.8 MMOL/L — SIGNIFICANT CHANGE UP (ref 3.5–5.3)
POTASSIUM SERPL-MCNC: 4.3 MMOL/L — SIGNIFICANT CHANGE UP (ref 3.5–5.3)
POTASSIUM SERPL-SCNC: 3.8 MMOL/L — SIGNIFICANT CHANGE UP (ref 3.5–5.3)
POTASSIUM SERPL-SCNC: 4.3 MMOL/L — SIGNIFICANT CHANGE UP (ref 3.5–5.3)
RBC # BLD: 3.18 M/UL — LOW (ref 3.8–5.2)
RBC # FLD: 16.1 % — HIGH (ref 10.3–14.5)
SODIUM SERPL-SCNC: 138 MMOL/L — SIGNIFICANT CHANGE UP (ref 135–145)
SODIUM SERPL-SCNC: 139 MMOL/L — SIGNIFICANT CHANGE UP (ref 135–145)
SPECIMEN SOURCE: SIGNIFICANT CHANGE UP
WBC # BLD: 9.74 K/UL — SIGNIFICANT CHANGE UP (ref 3.8–10.5)
WBC # FLD AUTO: 9.74 K/UL — SIGNIFICANT CHANGE UP (ref 3.8–10.5)

## 2019-05-14 PROCEDURE — 99232 SBSQ HOSP IP/OBS MODERATE 35: CPT

## 2019-05-14 PROCEDURE — 99233 SBSQ HOSP IP/OBS HIGH 50: CPT

## 2019-05-14 RX ORDER — POTASSIUM CHLORIDE 20 MEQ
20 PACKET (EA) ORAL ONCE
Refills: 0 | Status: COMPLETED | OUTPATIENT
Start: 2019-05-14 | End: 2019-05-14

## 2019-05-14 RX ORDER — SPIRONOLACTONE 25 MG/1
25 TABLET, FILM COATED ORAL DAILY
Refills: 0 | Status: DISCONTINUED | OUTPATIENT
Start: 2019-05-14 | End: 2019-05-16

## 2019-05-14 RX ORDER — MILRINONE LACTATE 1 MG/ML
0.12 INJECTION, SOLUTION INTRAVENOUS
Qty: 20 | Refills: 0 | Status: DISCONTINUED | OUTPATIENT
Start: 2019-05-14 | End: 2019-05-15

## 2019-05-14 RX ADMIN — Medication 10 MILLIGRAM(S): at 21:32

## 2019-05-14 RX ADMIN — MILRINONE LACTATE 4.28 MICROGRAM(S)/KG/MIN: 1 INJECTION, SOLUTION INTRAVENOUS at 07:03

## 2019-05-14 RX ADMIN — TICAGRELOR 90 MILLIGRAM(S): 90 TABLET ORAL at 05:07

## 2019-05-14 RX ADMIN — BUMETANIDE 2.5 MG/HR: 0.25 INJECTION INTRAMUSCULAR; INTRAVENOUS at 00:10

## 2019-05-14 RX ADMIN — GABAPENTIN 100 MILLIGRAM(S): 400 CAPSULE ORAL at 17:39

## 2019-05-14 RX ADMIN — GABAPENTIN 100 MILLIGRAM(S): 400 CAPSULE ORAL at 05:07

## 2019-05-14 RX ADMIN — Medication 2: at 13:18

## 2019-05-14 RX ADMIN — Medication 1 SPRAY(S): at 05:07

## 2019-05-14 RX ADMIN — INSULIN GLARGINE 55 UNIT(S): 100 INJECTION, SOLUTION SUBCUTANEOUS at 21:33

## 2019-05-14 RX ADMIN — SPIRONOLACTONE 25 MILLIGRAM(S): 25 TABLET, FILM COATED ORAL at 17:39

## 2019-05-14 RX ADMIN — Medication 100 MILLIGRAM(S): at 05:07

## 2019-05-14 RX ADMIN — Medication 2: at 17:43

## 2019-05-14 RX ADMIN — BUDESONIDE AND FORMOTEROL FUMARATE DIHYDRATE 2 PUFF(S): 160; 4.5 AEROSOL RESPIRATORY (INHALATION) at 08:47

## 2019-05-14 RX ADMIN — ISOSORBIDE DINITRATE 10 MILLIGRAM(S): 5 TABLET ORAL at 17:39

## 2019-05-14 RX ADMIN — PANTOPRAZOLE SODIUM 40 MILLIGRAM(S): 20 TABLET, DELAYED RELEASE ORAL at 05:07

## 2019-05-14 RX ADMIN — MONTELUKAST 10 MILLIGRAM(S): 4 TABLET, CHEWABLE ORAL at 13:18

## 2019-05-14 RX ADMIN — ATORVASTATIN CALCIUM 40 MILLIGRAM(S): 80 TABLET, FILM COATED ORAL at 21:32

## 2019-05-14 RX ADMIN — Medication 10 UNIT(S): at 17:43

## 2019-05-14 RX ADMIN — Medication 1 SPRAY(S): at 13:19

## 2019-05-14 RX ADMIN — CHLORHEXIDINE GLUCONATE 1 APPLICATION(S): 213 SOLUTION TOPICAL at 13:18

## 2019-05-14 RX ADMIN — Medication 1 SPRAY(S): at 21:33

## 2019-05-14 RX ADMIN — Medication 2: at 21:32

## 2019-05-14 RX ADMIN — ISOSORBIDE DINITRATE 10 MILLIGRAM(S): 5 TABLET ORAL at 00:10

## 2019-05-14 RX ADMIN — Medication 10 UNIT(S): at 13:19

## 2019-05-14 RX ADMIN — Medication 10 UNIT(S): at 08:47

## 2019-05-14 RX ADMIN — TICAGRELOR 90 MILLIGRAM(S): 90 TABLET ORAL at 17:39

## 2019-05-14 RX ADMIN — ISOSORBIDE DINITRATE 10 MILLIGRAM(S): 5 TABLET ORAL at 08:47

## 2019-05-14 RX ADMIN — MILRINONE LACTATE 2.14 MICROGRAM(S)/KG/MIN: 1 INJECTION, SOLUTION INTRAVENOUS at 19:32

## 2019-05-14 RX ADMIN — SENNA PLUS 2 TABLET(S): 8.6 TABLET ORAL at 21:32

## 2019-05-14 RX ADMIN — Medication 20 MILLIEQUIVALENT(S): at 13:18

## 2019-05-14 RX ADMIN — Medication 100 MILLIGRAM(S): at 17:39

## 2019-05-14 RX ADMIN — Medication 10 MILLIGRAM(S): at 05:07

## 2019-05-14 RX ADMIN — Medication 12.5 MILLIGRAM(S): at 05:07

## 2019-05-14 RX ADMIN — BUDESONIDE AND FORMOTEROL FUMARATE DIHYDRATE 2 PUFF(S): 160; 4.5 AEROSOL RESPIRATORY (INHALATION) at 21:32

## 2019-05-14 RX ADMIN — Medication 81 MILLIGRAM(S): at 13:18

## 2019-05-14 RX ADMIN — Medication 10 MILLIGRAM(S): at 13:18

## 2019-05-14 RX ADMIN — POLYETHYLENE GLYCOL 3350 17 GRAM(S): 17 POWDER, FOR SOLUTION ORAL at 05:07

## 2019-05-14 RX ADMIN — Medication 50 MICROGRAM(S): at 05:07

## 2019-05-14 NOTE — PROGRESS NOTE ADULT - ASSESSMENT
A 73 year-old Female  with HTN, CAD, HFrEF, severe MR, and severe pulm hypertension who presented to the hospital with cough and shortness of breath on 4/25/19. On arrival, patient appeared tachypneic, found to have SVT with rate of 150. The Vagal maneuver performed with conversion to sinus rhythm.  She has admitted for acute decompensated CHF, managed by Cardiology team , was in CCU and now transferred to telemetry floor. She was also evaluated by ID, Dr. Roman. for pneumonia. Now the ID consult requested for second opinion for persistent Leukocytosis. The patient has RRT this morning due to Hypotension and SVT. She has no fever or chills BUT c/o chest heaviness and tightness.       # SIRS ( tachycardia + Tachypnea + Hypotension)  # s/p RRT due to SVT and Hypotension  # Acute decompensated CHF -  Prolonged hospitalization, TTE with severe LV dysfunction, severe MR and PH.  And LV shows ischemic MR.  For MR clip referral    would recommend:    1. Obtain cultures and procalcitonin level  2. Obtain CT chest  3. Monitor WBC count  4. Management of SVT,  decompensated CHF, and Pulm edema as per Primary    d/w Dr. Velarde, patient and Family at the bed side    will follow the patient with you A 73 year-old Female  with HTN, CAD, HFrEF, severe MR, and severe pulm hypertension who presented to the hospital with cough and shortness of breath on 4/25/19. On arrival, patient appeared tachypneic, found to have SVT with rate of 150. The Vagal maneuver performed with conversion to sinus rhythm.  She has admitted for acute decompensated CHF, managed by Cardiology team , was in CCU and now transferred to telemetry floor. She was also evaluated by ID, Dr. Roman. for pneumonia. Now the ID consult requested for second opinion for persistent Leukocytosis. The patient has RRT this morning due to Hypotension and SVT. She has no fever or chills BUT c/o chest heaviness and tightness.       # SIRS ( tachycardia + Tachypnea + Hypotension)  # s/p RRT due to SVT and Hypotension  # Acute decompensated CHF -  Prolonged hospitalization, TTE with severe LV dysfunction, severe MR and PH.  And LV shows ischemic MR.  For MR clip referral  # Leukocytosis - Resolved, cultures are negative to date, CT chest shows interstitial edema. Procalcitonin mildly elevated     would recommend:    1. Diuresis as per Primary/Cardiology  2. Trend procalcitonin level  3. OOB o chair   4. Management of SVT,  decompensated CHF, and Pulm edema as per Primary    d/w Dr. Velarde, DR. Roman and patient    will follow the patient with you

## 2019-05-14 NOTE — PROGRESS NOTE ADULT - SUBJECTIVE AND OBJECTIVE BOX
Patient seen and examined at bedside  s/p ct chest (+) pulm edema yet improving  Case discussed with medical team    HPI:  73YOF with hx of CAD s/p CABG, hypothyroidism, CKD, CHF, HTN, DM p/w worsening ZEE and sob. Patient usually can walk up a few steps before feeling sob, currently feeling sob after walking from living room to kitchen.  She is using 3 pillows to sleep. No cough, fevers. (+) CP, SS and constant, She experienced more ZEE for last two days.  She has no edema in lower extremety.  Last admission to [facility] was 11/18. (24 Apr 2019 10:21)      PAST MEDICAL & SURGICAL HISTORY:  GERD (gastroesophageal reflux disease)  CAD (coronary artery disease): s/p CABG  Hypothyroidism  Hyperlipidemia  Diabetes mellitus, type 2  S/P CABG (coronary artery bypass graft)      azithromycin (Hives; Pruritus)       MEDICATIONS  (STANDING):  aspirin enteric coated 81 milliGRAM(s) Oral daily  atorvastatin 40 milliGRAM(s) Oral at bedtime  buDESOnide 160 MICROgram(s)/formoterol 4.5 MICROgram(s) Inhaler 2 Puff(s) Inhalation two times a day  buMETAnide Infusion 0.5 mG/Hr (2.5 mL/Hr) IV Continuous <Continuous>  chlorhexidine 4% Liquid 1 Application(s) Topical <User Schedule>  dextrose 5%. 1000 milliLiter(s) (50 mL/Hr) IV Continuous <Continuous>  dextrose 50% Injectable 12.5 Gram(s) IV Push once  dextrose 50% Injectable 25 Gram(s) IV Push once  dextrose 50% Injectable 25 Gram(s) IV Push once  docusate sodium 100 milliGRAM(s) Oral two times a day  ergocalciferol 84024 Unit(s) Oral <User Schedule>  gabapentin 100 milliGRAM(s) Oral two times a day  hydrALAZINE 10 milliGRAM(s) Oral three times a day  influenza   Vaccine 0.5 milliLiter(s) IntraMuscular once  insulin glargine Injectable (LANTUS) 55 Unit(s) SubCutaneous at bedtime  insulin lispro (HumaLOG) corrective regimen sliding scale   SubCutaneous three times a day before meals  insulin lispro (HumaLOG) corrective regimen sliding scale   SubCutaneous at bedtime  insulin lispro Injectable (HumaLOG) 10 Unit(s) SubCutaneous three times a day with meals  isosorbide   dinitrate Tablet (ISORDIL) 10 milliGRAM(s) Oral three times a day  levothyroxine 50 MICROGram(s) Oral daily  metoprolol succinate ER 12.5 milliGRAM(s) Oral daily  milrinone Infusion 0.25 MICROgram(s)/kG/Min (4.275 mL/Hr) IV Continuous <Continuous>  montelukast 10 milliGRAM(s) Oral daily  pantoprazole    Tablet 40 milliGRAM(s) Oral before breakfast  polyethylene glycol 3350 17 Gram(s) Oral two times a day  senna 2 Tablet(s) Oral at bedtime  sodium chloride 0.65% Nasal 1 Spray(s) Both Nostrils three times a day  ticagrelor 90 milliGRAM(s) Oral two times a day    MEDICATIONS  (PRN):  acetaminophen   Tablet .. 650 milliGRAM(s) Oral every 6 hours PRN Mild Pain (1 - 3), Moderate Pain (4 - 6), Severe Pain (7 - 10)  ALPRAZolam 0.25 milliGRAM(s) Oral every 12 hours PRN panic attack  dextrose 40% Gel 15 Gram(s) Oral once PRN Blood Glucose LESS THAN 70 milliGRAM(s)/deciliter  glucagon  Injectable 1 milliGRAM(s) IntraMuscular once PRN Glucose LESS THAN 70 milligrams/deciliter      REVIEW OF SYSTEMS:  CONSTITUTIONAL: (+) gen weakness. fatigue. malaise.   EYES: No acute change in vision   ENT:  No tinnitus  NECK: No stiffness  RESPIRATORY: zee. sob. No hemoptysis  CARDIOVASCULAR: No chest pain, palpitations, syncope  GASTROINTESTINAL: No hematemesis, diarrhea, melena, or hematochezia.  GENITOURINARY: No hematuria  NEUROLOGICAL: No headaches  LYMPH Nodes: No enlarged glands  ENDOCRINE: No heat or cold intolerance	    T(C): 36.6 (05-14-19 @ 04:07), Max: 36.7 (05-13-19 @ 11:56)  HR: 85 (05-14-19 @ 04:07) (83 - 88)  BP: 100/45 (05-14-19 @ 04:07) (94/54 - 145/71)  RR: 17 (05-14-19 @ 04:07) (17 - 18)  SpO2: 100% (05-14-19 @ 04:07) (99% - 100%)    PHYSICAL EXAMINATION:   Constitutional: NAD  HEENT: NC, AT  Neck:  Supple  Respiratory:  Adequate airflow b/l. Not using accessory muscles of respiration.  Cardiovascular: stable sys murmur, S1 & S2 intact, no R/G, 2+ radial pulses b/l  Gastrointestinal: Soft, NT, ND, normoactive b.s., no organomegaly/RT/rigidity  Extremities: WWP  Neurological:  Alert and awake.  No acute focal motor deficits. Crude sensation intact.     Labs and imaging reviewed    LABS:                        9.1    9.74  )-----------( 337      ( 14 May 2019 05:50 )             29.4     05-14    139  |  100  |  70<H>  ----------------------------<  171<H>  3.8   |  22  |  2.04<H>    Ca    9.4      14 May 2019 05:50  Phos  3.1     05-13  Mg     2.3     05-14    TPro  7.4  /  Alb  3.7  /  TBili  0.4  /  DBili  x   /  AST  19  /  ALT  23  /  AlkPhos  69  05-13            CAPILLARY BLOOD GLUCOSE      POCT Blood Glucose.: 321 mg/dL (13 May 2019 21:38)  POCT Blood Glucose.: 373 mg/dL (13 May 2019 17:13)  POCT Blood Glucose.: 254 mg/dL (13 May 2019 12:19)  POCT Blood Glucose.: 174 mg/dL (13 May 2019 08:32)        LIVER FUNCTIONS - ( 13 May 2019 06:10 )  Alb: 3.7 g/dL / Pro: 7.4 g/dL / ALK PHOS: 69 u/L / ALT: 23 u/L / AST: 19 u/L / GGT: x               RADIOLOGY & ADDITIONAL STUDIES:

## 2019-05-14 NOTE — PROGRESS NOTE ADULT - PROBLEM SELECTOR PLAN 2
Pt to get mitral clip evaluation as oupatient   Plan is to get pt home or rehab with outpatient follow up.

## 2019-05-14 NOTE — PROGRESS NOTE ADULT - ASSESSMENT
73YOF with hx of CAD s/p CABG, hypothyroidism, CKD, CHF, HTN, DM p/w worsening ZEE and sob.    A/p  MERVIN  Etiology?  Likely sec to CHF  s/p cardiac cath 05/09  Renal function improving  on bumex gtt  monitor bmp  avoid nephrotoxic agents      CKD stage 3  baseline cr 1.5-1.8  likely sec to HTN/DM/chf  elevated PTH, vit d insufficieny, continue vit d 13301 QW x 4 dose   phos optimal    CHF  monitor daily weight and i/o  diuretics per cardio  f/u cardio

## 2019-05-14 NOTE — PROGRESS NOTE ADULT - SUBJECTIVE AND OBJECTIVE BOX
Patient is seen and examined at the bed side, is afebrile.        REVIEW OF SYSTEMS: All other review systems are negative        ALLERGIES:  azithromycin (Hives; Pruritus)        Vital Signs Last 24 Hrs  T(C): 36.7 (14 May 2019 20:51), Max: 36.7 (14 May 2019 20:51)  T(F): 98 (14 May 2019 20:51), Max: 98 (14 May 2019 20:51)  HR: 80 (14 May 2019 20:51) (80 - 86)  BP: 107/48 (14 May 2019 20:51) (94/54 - 119/63)  BP(mean): --  RR: 18 (14 May 2019 20:51) (17 - 18)  SpO2: 100% (14 May 2019 20:51) (99% - 100%)      PHYSICAL EXAM:  GENERAL: Appears ill  CHEST/LUNG:  Air entry bilaterally  HEART: s1 and s2 present  ABDOMEN:  Nontender and  Nondistended  EXTREMITIES: No pedal  edema  CNS: Awake and Alert          LABS:                        9.1    9.74  )-----------( 337      ( 14 May 2019 05:50 )             29.4                           8.0    11.66 )-----------( 269      ( 12 May 2019 03:00 )             25.2         05-14    138  |  101  |  70<H>  ----------------------------<  257<H>  4.3   |  18<L>  |  1.92<H>    Ca    9.7      14 May 2019 18:45  Phos  3.1     05-13  Mg     2.3     05-14    TPro  7.4  /  Alb  3.7  /  TBili  0.4  /  DBili  x   /  AST  19  /  ALT  23  /  AlkPhos  69  05-13    05-12    134<L>  |  99  |  79<H>  ----------------------------<  201<H>  3.5   |  19<L>  |  2.37<H>    Ca    8.1<L>      12 May 2019 03:00  Phos  3.8     05-12  Mg     2.2     05-12    TPro  7.4  /  Alb  3.80  /  TBili  0.5  /  DBili  x   /  AST  22  /  ALT  28  /  AlkPhos  74  05-11    PT/INR - ( 12 May 2019 03:00 )   PT: 12.4 SEC;   INR: 1.11     PTT - ( 12 May 2019 03:00 )  PTT:36.2 SEC        CAPILLARY BLOOD GLUCOSE  POCT Blood Glucose.: 369 mg/dL (12 May 2019 17:21)  POCT Blood Glucose.: 263 mg/dL (12 May 2019 12:40)  POCT Blood Glucose.: 251 mg/dL (12 May 2019 08:35)  POCT Blood Glucose.: 213 mg/dL (12 May 2019 02:45)  POCT Blood Glucose.: 253 mg/dL (11 May 2019 23:01)          MEDICATIONS  (STANDING):  aspirin enteric coated 81 milliGRAM(s) Oral daily  atorvastatin 40 milliGRAM(s) Oral at bedtime  buDESOnide 160 MICROgram(s)/formoterol 4.5 MICROgram(s) Inhaler 2 Puff(s) Inhalation two times a day  buMETAnide Infusion 0.5 mG/Hr (2.5 mL/Hr) IV Continuous <Continuous>  chlorhexidine 4% Liquid 1 Application(s) Topical <User Schedule>  dextrose 5%. 1000 milliLiter(s) (50 mL/Hr) IV Continuous <Continuous>  dextrose 50% Injectable 12.5 Gram(s) IV Push once  dextrose 50% Injectable 25 Gram(s) IV Push once  dextrose 50% Injectable 25 Gram(s) IV Push once  docusate sodium 100 milliGRAM(s) Oral two times a day  ergocalciferol 74547 Unit(s) Oral <User Schedule>  gabapentin 100 milliGRAM(s) Oral two times a day  hydrALAZINE 10 milliGRAM(s) Oral three times a day  influenza   Vaccine 0.5 milliLiter(s) IntraMuscular once  insulin glargine Injectable (LANTUS) 55 Unit(s) SubCutaneous at bedtime  insulin lispro (HumaLOG) corrective regimen sliding scale   SubCutaneous three times a day before meals  insulin lispro (HumaLOG) corrective regimen sliding scale   SubCutaneous at bedtime  insulin lispro Injectable (HumaLOG) 10 Unit(s) SubCutaneous three times a day with meals  isosorbide   dinitrate Tablet (ISORDIL) 10 milliGRAM(s) Oral three times a day  levothyroxine 50 MICROGram(s) Oral daily  metoprolol succinate ER 12.5 milliGRAM(s) Oral daily  milrinone Infusion 0.125 MICROgram(s)/kG/Min (2.138 mL/Hr) IV Continuous <Continuous>  montelukast 10 milliGRAM(s) Oral daily  pantoprazole    Tablet 40 milliGRAM(s) Oral before breakfast  polyethylene glycol 3350 17 Gram(s) Oral two times a day  senna 2 Tablet(s) Oral at bedtime  sodium chloride 0.65% Nasal 1 Spray(s) Both Nostrils three times a day  spironolactone 25 milliGRAM(s) Oral daily  ticagrelor 90 milliGRAM(s) Oral two times a day    MEDICATIONS  (PRN):  acetaminophen   Tablet .. 650 milliGRAM(s) Oral every 6 hours PRN Mild Pain (1 - 3), Moderate Pain (4 - 6), Severe Pain (7 - 10)  ALPRAZolam 0.25 milliGRAM(s) Oral every 12 hours PRN panic attack  dextrose 40% Gel 15 Gram(s) Oral once PRN Blood Glucose LESS THAN 70 milliGRAM(s)/deciliter  glucagon  Injectable 1 milliGRAM(s) IntraMuscular once PRN Glucose LESS THAN 70 milligrams/deciliter      RADIOLOGY & ADDITIONAL TESTS:    < from: CT Chest No Cont (05.13.19 @ 10:34) >  Patchy peribronchovascular-central groundglass opacities predominantly   within the mid to lower lungs may represent interstitial edema and are   decreased from prior exams.        < end of copied text >      5/9/19 : Xray Chest 1 View-PORTABLE IMMEDIATE (05.09.19 @ 01:31) IMPRESSION:  New interstitial pulmonary edema.        Procalcitonin, Serum (05.13.19 @ 19:14)    Procalcitonin, Serum: 0.30: Procalcitonin (PCT) Interpretation (ng/mL) - Diagnosis of  systemic bacterial infection/sepsis:      Culture - Blood in AM (05.13.19 @ 07:40)    Culture - Blood:   NO ORGANISMS ISOLATED  NO ORGANISMS ISOLATED AT 24 HOURS    Specimen Source: BLOOD      Culture - Blood (05.12.19 @ 18:37)    Culture - Blood:   NO ORGANISMS ISOLATED  NO ORGANISMS ISOLATED AT 48 HRS.    Specimen Source: BLOOD PERIPHERAL Patient is seen and examined at the bed side, is afebrile. She is feeling better now. The HR and BP is within normal limit. The EP recommendation noted.  The WBC count trended down to normal and stay normal. The creatinine is trending down also.        REVIEW OF SYSTEMS: All other review systems are negative        ALLERGIES:  azithromycin (Hives; Pruritus)        Vital Signs Last 24 Hrs  T(C): 36.7 (14 May 2019 20:51), Max: 36.7 (14 May 2019 20:51)  T(F): 98 (14 May 2019 20:51), Max: 98 (14 May 2019 20:51)  HR: 80 (14 May 2019 20:51) (80 - 86)  BP: 107/48 (14 May 2019 20:51) (94/54 - 119/63)  BP(mean): --  RR: 18 (14 May 2019 20:51) (17 - 18)  SpO2: 100% (14 May 2019 20:51) (99% - 100%)      PHYSICAL EXAM:  GENERAL: Not in distress  CHEST/LUNG:  Air entry bilaterally with no wheezing  HEART: s1 and s2 present  ABDOMEN:  Nontender and  Nondistended  EXTREMITIES: No pedal  edema  CNS: Awake and Alert          LABS:                        9.1    9.74  )-----------( 337      ( 14 May 2019 05:50 )             29.4                           8.0    11.66 )-----------( 269      ( 12 May 2019 03:00 )             25.2         05-14    138  |  101  |  70<H>  ----------------------------<  257<H>  4.3   |  18<L>  |  1.92<H>    Ca    9.7      14 May 2019 18:45  Phos  3.1     05-13  Mg     2.3     05-14    TPro  7.4  /  Alb  3.7  /  TBili  0.4  /  DBili  x   /  AST  19  /  ALT  23  /  AlkPhos  69  05-13    05-12    134<L>  |  99  |  79<H>  ----------------------------<  201<H>  3.5   |  19<L>  |  2.37<H>    Ca    8.1<L>      12 May 2019 03:00  Phos  3.8     05-12  Mg     2.2     05-12    TPro  7.4  /  Alb  3.80  /  TBili  0.5  /  DBili  x   /  AST  22  /  ALT  28  /  AlkPhos  74  05-11    PT/INR - ( 12 May 2019 03:00 )   PT: 12.4 SEC;   INR: 1.11     PTT - ( 12 May 2019 03:00 )  PTT:36.2 SEC        CAPILLARY BLOOD GLUCOSE  POCT Blood Glucose.: 369 mg/dL (12 May 2019 17:21)  POCT Blood Glucose.: 263 mg/dL (12 May 2019 12:40)  POCT Blood Glucose.: 251 mg/dL (12 May 2019 08:35)  POCT Blood Glucose.: 213 mg/dL (12 May 2019 02:45)  POCT Blood Glucose.: 253 mg/dL (11 May 2019 23:01)          MEDICATIONS  (STANDING):  aspirin enteric coated 81 milliGRAM(s) Oral daily  atorvastatin 40 milliGRAM(s) Oral at bedtime  buDESOnide 160 MICROgram(s)/formoterol 4.5 MICROgram(s) Inhaler 2 Puff(s) Inhalation two times a day  buMETAnide Infusion 0.5 mG/Hr (2.5 mL/Hr) IV Continuous <Continuous>  chlorhexidine 4% Liquid 1 Application(s) Topical <User Schedule>  dextrose 5%. 1000 milliLiter(s) (50 mL/Hr) IV Continuous <Continuous>  dextrose 50% Injectable 12.5 Gram(s) IV Push once  dextrose 50% Injectable 25 Gram(s) IV Push once  dextrose 50% Injectable 25 Gram(s) IV Push once  docusate sodium 100 milliGRAM(s) Oral two times a day  ergocalciferol 86847 Unit(s) Oral <User Schedule>  gabapentin 100 milliGRAM(s) Oral two times a day  hydrALAZINE 10 milliGRAM(s) Oral three times a day  influenza   Vaccine 0.5 milliLiter(s) IntraMuscular once  insulin glargine Injectable (LANTUS) 55 Unit(s) SubCutaneous at bedtime  insulin lispro (HumaLOG) corrective regimen sliding scale   SubCutaneous three times a day before meals  insulin lispro (HumaLOG) corrective regimen sliding scale   SubCutaneous at bedtime  insulin lispro Injectable (HumaLOG) 10 Unit(s) SubCutaneous three times a day with meals  isosorbide   dinitrate Tablet (ISORDIL) 10 milliGRAM(s) Oral three times a day  levothyroxine 50 MICROGram(s) Oral daily  metoprolol succinate ER 12.5 milliGRAM(s) Oral daily  milrinone Infusion 0.125 MICROgram(s)/kG/Min (2.138 mL/Hr) IV Continuous <Continuous>  montelukast 10 milliGRAM(s) Oral daily  pantoprazole    Tablet 40 milliGRAM(s) Oral before breakfast  polyethylene glycol 3350 17 Gram(s) Oral two times a day  senna 2 Tablet(s) Oral at bedtime  sodium chloride 0.65% Nasal 1 Spray(s) Both Nostrils three times a day  spironolactone 25 milliGRAM(s) Oral daily  ticagrelor 90 milliGRAM(s) Oral two times a day    MEDICATIONS  (PRN):  acetaminophen   Tablet .. 650 milliGRAM(s) Oral every 6 hours PRN Mild Pain (1 - 3), Moderate Pain (4 - 6), Severe Pain (7 - 10)  ALPRAZolam 0.25 milliGRAM(s) Oral every 12 hours PRN panic attack  dextrose 40% Gel 15 Gram(s) Oral once PRN Blood Glucose LESS THAN 70 milliGRAM(s)/deciliter  glucagon  Injectable 1 milliGRAM(s) IntraMuscular once PRN Glucose LESS THAN 70 milligrams/deciliter      RADIOLOGY & ADDITIONAL TESTS:    5/13/19 : CT Chest No Cont (05.13.19 @ 10:34) Patchy peribronchovascular-central groundglass opacities predominantly  within the mid to lower lungs may represent interstitial edema and are   decreased from prior exams.      5/9/19 : Xray Chest 1 View-PORTABLE IMMEDIATE (05.09.19 @ 01:31) IMPRESSION:  New interstitial pulmonary edema.        Procalcitonin, Serum (05.13.19 @ 19:14)    Procalcitonin, Serum: 0.30: Procalcitonin (PCT) Interpretation (ng/mL) - Diagnosis of  systemic bacterial infection/sepsis:      Culture - Blood in AM (05.13.19 @ 07:40)    Culture - Blood:   NO ORGANISMS ISOLATED  NO ORGANISMS ISOLATED AT 24 HOURS    Specimen Source: BLOOD      Culture - Blood (05.12.19 @ 18:37)    Culture - Blood:   NO ORGANISMS ISOLATED  NO ORGANISMS ISOLATED AT 48 HRS.    Specimen Source: BLOOD PERIPHERAL

## 2019-05-14 NOTE — PROGRESS NOTE ADULT - SUBJECTIVE AND OBJECTIVE BOX
Patient is a 73y old  Female who presents with a chief complaint of sob (14 May 2019 14:44)      Any change in ROS: eventsnoted: still pretty sob: on diuretics as wellas ionotropes     MEDICATIONS  (STANDING):  aspirin enteric coated 81 milliGRAM(s) Oral daily  atorvastatin 40 milliGRAM(s) Oral at bedtime  buDESOnide 160 MICROgram(s)/formoterol 4.5 MICROgram(s) Inhaler 2 Puff(s) Inhalation two times a day  buMETAnide Infusion 0.5 mG/Hr (2.5 mL/Hr) IV Continuous <Continuous>  chlorhexidine 4% Liquid 1 Application(s) Topical <User Schedule>  dextrose 5%. 1000 milliLiter(s) (50 mL/Hr) IV Continuous <Continuous>  dextrose 50% Injectable 12.5 Gram(s) IV Push once  dextrose 50% Injectable 25 Gram(s) IV Push once  dextrose 50% Injectable 25 Gram(s) IV Push once  docusate sodium 100 milliGRAM(s) Oral two times a day  ergocalciferol 55137 Unit(s) Oral <User Schedule>  gabapentin 100 milliGRAM(s) Oral two times a day  hydrALAZINE 10 milliGRAM(s) Oral three times a day  influenza   Vaccine 0.5 milliLiter(s) IntraMuscular once  insulin glargine Injectable (LANTUS) 55 Unit(s) SubCutaneous at bedtime  insulin lispro (HumaLOG) corrective regimen sliding scale   SubCutaneous three times a day before meals  insulin lispro (HumaLOG) corrective regimen sliding scale   SubCutaneous at bedtime  insulin lispro Injectable (HumaLOG) 10 Unit(s) SubCutaneous three times a day with meals  isosorbide   dinitrate Tablet (ISORDIL) 10 milliGRAM(s) Oral three times a day  levothyroxine 50 MICROGram(s) Oral daily  metoprolol succinate ER 12.5 milliGRAM(s) Oral daily  milrinone Infusion 0.25 MICROgram(s)/kG/Min (4.275 mL/Hr) IV Continuous <Continuous>  montelukast 10 milliGRAM(s) Oral daily  pantoprazole    Tablet 40 milliGRAM(s) Oral before breakfast  polyethylene glycol 3350 17 Gram(s) Oral two times a day  senna 2 Tablet(s) Oral at bedtime  sodium chloride 0.65% Nasal 1 Spray(s) Both Nostrils three times a day  ticagrelor 90 milliGRAM(s) Oral two times a day    MEDICATIONS  (PRN):  acetaminophen   Tablet .. 650 milliGRAM(s) Oral every 6 hours PRN Mild Pain (1 - 3), Moderate Pain (4 - 6), Severe Pain (7 - 10)  ALPRAZolam 0.25 milliGRAM(s) Oral every 12 hours PRN panic attack  dextrose 40% Gel 15 Gram(s) Oral once PRN Blood Glucose LESS THAN 70 milliGRAM(s)/deciliter  glucagon  Injectable 1 milliGRAM(s) IntraMuscular once PRN Glucose LESS THAN 70 milligrams/deciliter    Vital Signs Last 24 Hrs  T(C): 36.4 (14 May 2019 13:22), Max: 36.7 (13 May 2019 17:29)  T(F): 97.6 (14 May 2019 13:22), Max: 98.1 (13 May 2019 17:29)  HR: 86 (14 May 2019 13:22) (81 - 88)  BP: 119/63 (14 May 2019 13:22) (94/54 - 119/63)  BP(mean): --  RR: 18 (14 May 2019 13:22) (17 - 18)  SpO2: 100% (14 May 2019 13:22) (99% - 100%)    I&O's Summary    13 May 2019 07:01  -  14 May 2019 07:00  --------------------------------------------------------  IN: 981.3 mL / OUT: 2200 mL / NET: -1218.7 mL          Physical Exam:   GENERAL: NAD, well-groomed, well-developed  HEENT: MOHSEN/   Atraumatic, Normocephalic  ENMT: No tonsillar erythema, exudates, or enlargement; Moist mucous membranes, Good dentition, No lesions  NECK: Supple, No JVD, Normal thyroid  CHEST/LUNG: Crackles at bases  CVS: Regular rate and rhythm; No murmurs, rubs, or gallops  GI: : Soft, Nontender, Nondistended; Bowel sounds present  NERVOUS SYSTEM:  Alert & Oriented X3  EXTREMITIES:  2+ Peripheral Pulses, No clubbing, cyanosis, or edema  LYMPH: No lymphadenopathy noted  SKIN: No rashes or lesions  ENDOCRINOLOGY: No Thyromegaly  PSYCH: Appropriate    Labs:  23                            9.1    9.74  )-----------( 337      ( 14 May 2019 05:50 )             29.4                         8.1    10.18 )-----------( 304      ( 13 May 2019 06:10 )             26.1                         8.0    11.66 )-----------( 269      ( 12 May 2019 03:00 )             25.2                         8.6    11.32 )-----------( 301      ( 11 May 2019 06:25 )             27.0                         8.3    11.52 )-----------( 276      ( 10 May 2019 23:50 )             25.6     05-14    139  |  100  |  70<H>  ----------------------------<  171<H>  3.8   |  22  |  2.04<H>  05-13    135  |  97<L>  |  74<H>  ----------------------------<  323<H>  4.3   |  23  |  2.11<H>  05-13    137  |  98  |  74<H>  ----------------------------<  330<H>  4.2   |  21<L>  |  2.11<H>  05-13    140  |  103  |  72<H>  ----------------------------<  160<H>  3.6   |  21<L>  |  2.09<H>  05-12    134<L>  |  99  |  79<H>  ----------------------------<  201<H>  3.5   |  19<L>  |  2.37<H>  05-11    139  |  102  |  79<H>  ----------------------------<  106<H>  3.7   |  21<L>  |  2.31<H>    Ca    9.4      14 May 2019 05:50  Ca    9.1      13 May 2019 20:42  Ca    9.1      13 May 2019 19:14  Ca    8.6      13 May 2019 06:10  Phos  3.1     05-13  Mg     2.3     05-14  Mg     2.2     05-13  Mg     2.3     05-13    TPro  7.4  /  Alb  3.7  /  TBili  0.4  /  DBili  x   /  AST  19  /  ALT  23  /  AlkPhos  69  05-13  TPro  7.4  /  Alb  3.80  /  TBili  0.5  /  DBili  x   /  AST  22  /  ALT  28  /  AlkPhos  74  05-11    CAPILLARY BLOOD GLUCOSE      POCT Blood Glucose.: 238 mg/dL (14 May 2019 12:31)  POCT Blood Glucose.: 147 mg/dL (14 May 2019 08:26)  POCT Blood Glucose.: 321 mg/dL (13 May 2019 21:38)  POCT Blood Glucose.: 373 mg/dL (13 May 2019 17:13)      LIVER FUNCTIONS - ( 13 May 2019 06:10 )  Alb: 3.7 g/dL / Pro: 7.4 g/dL / ALK PHOS: 69 u/L / ALT: 23 u/L / AST: 19 u/L / GGT: x               D-Dimer Assay, Quantitative: 243 ng/mL (05-08 @ 23:58)  Procalcitonin, Serum: 0.30 ng/mL (05-13 @ 19:14)  Procalcitonin, Serum: 0.34 ng/mL (05-13 @ 06:10)  Procalcitonin, Serum: 0.38 ng/mL (05-12 @ 18:25)        RECENT CULTURES:  05-13 @ 07:40 BLOOD            < from: CT Chest No Cont (05.13.19 @ 10:34) >    Mild bandlike atelectasis at the bases and lingula.    8mm fissural nodule within the left midlung is unchanged (series 6 image   50).    PLEURA: No pleural effusion.    MEDIASTINUM AND ADIA: No lymphadenopathy.    VESSELS: Mild atherosclerotic calcifications at the aortic arch and   origins of great vessels.    HEART: Cardiomegaly. No pericardial effusion. Coronary artery   calcifications. CABG.    CHEST WALL AND LOWER NECK: Status post median sternotomy. Paraspinal   musculature fatty atrophy.    VISUALIZED UPPER ABDOMEN: Within normal limits.    BONES: Within normal limits.    IMPRESSION:     Patchy peribronchovascular-central groundglass opacities predominantly   within the mid to lower lungs may represent interstitial edema and are   decreased from prior exams.                SANDHYA BERUMEN M.D., RADIOLOGY RESIDENT  This document has been electronically signed.  DIMITRIOS FRAZIER M.D., ATTENDING RADIOLOGIST  This document has been electronically signed. May 13 2019 11:21AM        < end of copied text >        NO ORGANISMS ISOLATED  NO ORGANISMS ISOLATED AT 24 HOURS  05-12 @ 18:37 BLOOD PERIPHERAL                  NO ORGANISMS ISOLATED  NO ORGANISMS ISOLATED AT 24 HOURS        RESPIRATORY CULTURES:          Studies  Chest X-RAY  CT SCAN Chest   Venous Dopplers: LE:   CT Abdomen  Others

## 2019-05-14 NOTE — PROGRESS NOTE ADULT - ASSESSMENT
73F with history of CAD s/p CABG, systolic CHF (EF 20%), CKD III, who p/w sob and pino. Initially manifesting with lab data and features of ACS she was treated with guideline therapy and also for acute decompensated systolic heart failure. Hospital course complicated by hypotension in the setting of beta blocker administration during acute decompensated HF. She was transition to the CCU for management and eventually cath revealing volume overload as well as 100% occlusion in LAD and RCA. HF service was involved and attempts were made to optimize her medical regimen and diurese her. Her assessment was notable for ischemic severe MR and this was evaluated by LV revealing severe functional MR. Course was further complicated by gouty flare, PNA and anemia.    Patient was eventually transferred to telemetry where she has several episodes of SVT over the course of the past few day prompting EP consultation.     #SVT - No episodes overnight, however, no medication adjustments made to reduce the potential of recurrence  - if no objections from HF would increase metoprolol succinate to 25mg and/or discontinue milrinone (if ready)    Given patient is requiring milrinone now would not pursue ICD implantation. Possible ablation if unable to medically manage.     JOHNNY Rausch MD  Cardiology Fellow (EP Service)  Cell: [phone] (until 5PM) / [phone] (after 5PM)  EP Charge: [id number]  Please check [email or web address] (Username: Paolo) for daily cardiology fellow schedule and contact information.

## 2019-05-14 NOTE — PROGRESS NOTE ADULT - ATTENDING COMMENTS
5/9: sob secondary to severe MR and chf exacerbation: for surgical evaluation:  5/10: May use bipap if requiried for increasing work of breathing: >? for alonso clip: defer to ccu  5/13: She seesm to eb doing ok but on meds including primacor as wellas diuretics: If her SOB increases, can u se bipap  5/14: No wheezing: cont Symbicort: most of the SOB I s due to chf and mitral regurgitation!!

## 2019-05-14 NOTE — PROGRESS NOTE ADULT - PROBLEM SELECTOR PLAN 1
Will continue current insulin regimen for now. Will continue monitoring FS, log, will Follow up.  Patient counseled for compliance with consistent low carb diet.   Patient counseled for compliance with consistent low carb diet.

## 2019-05-14 NOTE — PROGRESS NOTE ADULT - SUBJECTIVE AND OBJECTIVE BOX
[facility] [place] [facility]   MD RAHEEL SHAH MD ANGELA WONG, PA    TEL:  OFFICE: [phone]  DR SANTOYO CELL: [phone]  DR. NGUYEN CELL: [phone]  UMM KENDALL CELL: [phone]        Patient is a 73y old  Female who presents with a chief complaint of sob (14 May 2019 13:12)      Patient seen and examined at bedside. feeling better today    VITALS:  T(F): 97.6 (05-14-19 @ 13:22), Max: 98.1 (05-13-19 @ 17:29)  HR: 86 (05-14-19 @ 13:22)  BP: 119/63 (05-14-19 @ 13:22)  RR: 18 (05-14-19 @ 13:22)  SpO2: 100% (05-14-19 @ 13:22)  Wt(kg): --    05-13 @ 07:01  -  05-14 @ 07:00  --------------------------------------------------------  IN: 981.3 mL / OUT: 2200 mL / NET: -1218.7 mL          PHYSICAL EXAM:  Constitutional: NAD  Neck: No JVD  Respiratory: CTAB, no wheezes, rales or rhonchi  Cardiovascular: S1, S2, RRR  Gastrointestinal: BS+, soft, NT/ND  Extremities: No peripheral edema    Hospital Medications:   MEDICATIONS  (STANDING):  aspirin enteric coated 81 milliGRAM(s) Oral daily  atorvastatin 40 milliGRAM(s) Oral at bedtime  buDESOnide 160 MICROgram(s)/formoterol 4.5 MICROgram(s) Inhaler 2 Puff(s) Inhalation two times a day  buMETAnide Infusion 0.5 mG/Hr (2.5 mL/Hr) IV Continuous <Continuous>  chlorhexidine 4% Liquid 1 Application(s) Topical <User Schedule>  dextrose 5%. 1000 milliLiter(s) (50 mL/Hr) IV Continuous <Continuous>  dextrose 50% Injectable 12.5 Gram(s) IV Push once  dextrose 50% Injectable 25 Gram(s) IV Push once  dextrose 50% Injectable 25 Gram(s) IV Push once  docusate sodium 100 milliGRAM(s) Oral two times a day  ergocalciferol 03037 Unit(s) Oral <User Schedule>  gabapentin 100 milliGRAM(s) Oral two times a day  hydrALAZINE 10 milliGRAM(s) Oral three times a day  influenza   Vaccine 0.5 milliLiter(s) IntraMuscular once  insulin glargine Injectable (LANTUS) 55 Unit(s) SubCutaneous at bedtime  insulin lispro (HumaLOG) corrective regimen sliding scale   SubCutaneous three times a day before meals  insulin lispro (HumaLOG) corrective regimen sliding scale   SubCutaneous at bedtime  insulin lispro Injectable (HumaLOG) 10 Unit(s) SubCutaneous three times a day with meals  isosorbide   dinitrate Tablet (ISORDIL) 10 milliGRAM(s) Oral three times a day  levothyroxine 50 MICROGram(s) Oral daily  metoprolol succinate ER 12.5 milliGRAM(s) Oral daily  milrinone Infusion 0.25 MICROgram(s)/kG/Min (4.275 mL/Hr) IV Continuous <Continuous>  montelukast 10 milliGRAM(s) Oral daily  pantoprazole    Tablet 40 milliGRAM(s) Oral before breakfast  polyethylene glycol 3350 17 Gram(s) Oral two times a day  senna 2 Tablet(s) Oral at bedtime  sodium chloride 0.65% Nasal 1 Spray(s) Both Nostrils three times a day  ticagrelor 90 milliGRAM(s) Oral two times a day      LABS:  05-14    139  |  100  |  70<H>  ----------------------------<  171<H>  3.8   |  22  |  2.04<H>    Ca    9.4      14 May 2019 05:50  Phos  3.1     05-13  Mg     2.3     05-14    TPro  7.4  /  Alb  3.7  /  TBili  0.4  /  DBili      /  AST  19  /  ALT  23  /  AlkPhos  69  05-13    Creatinine Trend: 2.04 <--, 2.11 <--, 2.11 <--, 2.09 <--, 2.37 <--, 2.31 <--, 2.37 <--, 2.36 <--, 2.40 <--, 2.56 <--                                9.1    9.74  )-----------( 337      ( 14 May 2019 05:50 )             29.4     Urine Studies:  Urinalysis - [05-10-19 @ 09:30]      Color LIGHT YELLOW / Appearance CLEAR / SG 1.010 / pH 6.0      Gluc NEGATIVE / Ketone NEGATIVE  / Bili NEGATIVE / Urobili NORMAL       Blood NEGATIVE / Protein NEGATIVE / Leuk Est NEGATIVE / Nitrite NEGATIVE      RBC  / WBC  / Hyaline  / Gran  / Sq Epi  / Non Sq Epi  / Bacteria       .4 (Ca --)      [04-25-19 @ 05:45]   --  PTH 43.55 (Ca --)      [09-10-18 @ 07:15]   --  PTH 36.60 (Ca --)      [09-08-18 @ 03:15]   --  Vitamin D (25OH) 25.9      [05-01-19 @ 04:00]  HbA1c 7.3      [04-25-19 @ 05:45]  TSH 3.44      [05-07-19 @ 07:00]  Lipid: chol 127, , HDL 22, LDL 67      [04-24-19 @ 12:21]    HCV 0.06, Nonreactive Hepatitis C AB  S/CO Ratio                        Interpretation  < 1.00                                   Non-Reactive  1.00 - 4.99                         Weakly-Reactive  > 5.00                                Reactive  Non-Reactive: A person with a non-reactive HCV antibody  result is considered uninfected.  No further action is  needed unless recent infection is suspected.  In these  cases, consider repeat testing later to detect  seroconversion..  Weakly-Reactive: HCV antibody test is abnormal, HCV RNA  Qualitative test will follow.  Reactive: HCV antibody test is abnormal, HCV RNA  Qualitative test will follow.  Note: HCV antibody testing is performed on the [employer]   system.      [04-25-19 @ 05:45]      RADIOLOGY & ADDITIONAL STUDIES:

## 2019-05-14 NOTE — PROGRESS NOTE ADULT - ASSESSMENT
Assessment  DMT2: 73y Female with DM T2 with hyperglycemia, on basal bolus insulin, insulin dose was adjusted, blood sugars trending down, eating full meals, she is still feeling weak.  CAD: on medications, stable, monitored.  Hypothyroidism: On Synthroid 50 mcg po daily, compliant with Synthroid intake, asymptomatic.  HTN: Controlled,  on antihypertensive medications.  SOB: On Tx, oxygen.  Obesity: No strict exercise routines, not on any weight loss program, neither on low calorie diet.          Jillian Banks MD  Cell: [phone]  Office: [phone]

## 2019-05-14 NOTE — PROGRESS NOTE ADULT - ASSESSMENT
72 YO F w/o h/o CAD, s/p CABG Systolic CHF, CKD 3b, who p/w sob and pino.      - Acute on Chronic Systolic CHF Exacerbation, and Severe Mitral Valve Regurgitation:      - ct chest (+) interstitial edema yet improving      - c/w diuresis and cards meds      - all consultants management appreciated        - severe systolic dysfunction with severe MVR - pt/family agreeable to mitral valve clip - outpt referal      - c/w cardiac meds      - PT, strict i/o, tele, oob to chair     - Symptomatic Supraventricular Tachycardia:      - c/w tele      - c/w bb       - f/u cards and consultants      - all consultants & medical team members management greatly appreciated      - Leukocytosis:      - improved, unspecified etiology       - no signs of active infectious state     - Panic Attack:       - xanax prn        - c/w optimization of co-morbidities    - Symptomatic Anemia 2/2 gastrointestinal bleed:      - relatively stable h/h      - slow bleed, conservative management for now      - monitor h/h, maintain hgb > 7     - Acute Gout Flare:       - improved    - NSTEMI:      - asa/brilinta/statin      - adjust management prn      - tele    - MERVIN on CKD      - stable renal function.  trend renal function.      - optimize comorbidities. Avoid nephrotoxic rx     - DM II with long term complications of hyperglycemia, hypoglcyemia, and nephropathy   - c/w dm rx, as above    - monitor FS closely given hypoglycemia       - complicated by poor nutritional compliance (pt fed food from outside the hospital)       - c/w dm rx      - dm education

## 2019-05-14 NOTE — PROGRESS NOTE ADULT - PROBLEM SELECTOR PLAN 3
-Continue ASA and brilinta and lipitor  [facility] 5/9/19- multiple vessel CAD, cont medical therapy.

## 2019-05-14 NOTE — PROGRESS NOTE ADULT - ASSESSMENT
73YOF w/o h/o CAD, s/p CABG CHF adm to [facility] for Acute on Chronic systolic and diastolic HF

## 2019-05-14 NOTE — PROGRESS NOTE ADULT - PROBLEM SELECTOR PLAN 2
cont current medications including diuretics: for ? surgical option now ? alonso clip  5/14; Now plan is to dc to rehab first and outpt evalution for alonso clip

## 2019-05-14 NOTE — PROGRESS NOTE ADULT - SUBJECTIVE AND OBJECTIVE BOX
SELVIN CLAIRE:[id number],   73yFemale followed for:  azithromycin (Hives; Pruritus)    PAST MEDICAL & SURGICAL HISTORY:  GERD (gastroesophageal reflux disease)  CAD (coronary artery disease): s/p CABG  Hypothyroidism  Hyperlipidemia  Diabetes mellitus, type 2  S/P CABG (coronary artery bypass graft)    FAMILY HISTORY:  Family history of hypertension (Sibling): sister  Family history of diabetes mellitus (Sibling): brothers/sisters    MEDICATIONS  (STANDING):  aspirin enteric coated 81 milliGRAM(s) Oral daily  atorvastatin 40 milliGRAM(s) Oral at bedtime  buDESOnide 160 MICROgram(s)/formoterol 4.5 MICROgram(s) Inhaler 2 Puff(s) Inhalation two times a day  buMETAnide Infusion 0.5 mG/Hr (2.5 mL/Hr) IV Continuous <Continuous>  chlorhexidine 4% Liquid 1 Application(s) Topical <User Schedule>  dextrose 5%. 1000 milliLiter(s) (50 mL/Hr) IV Continuous <Continuous>  dextrose 50% Injectable 12.5 Gram(s) IV Push once  dextrose 50% Injectable 25 Gram(s) IV Push once  dextrose 50% Injectable 25 Gram(s) IV Push once  docusate sodium 100 milliGRAM(s) Oral two times a day  ergocalciferol 34306 Unit(s) Oral <User Schedule>  gabapentin 100 milliGRAM(s) Oral two times a day  hydrALAZINE 10 milliGRAM(s) Oral three times a day  influenza   Vaccine 0.5 milliLiter(s) IntraMuscular once  insulin glargine Injectable (LANTUS) 55 Unit(s) SubCutaneous at bedtime  insulin lispro (HumaLOG) corrective regimen sliding scale   SubCutaneous three times a day before meals  insulin lispro (HumaLOG) corrective regimen sliding scale   SubCutaneous at bedtime  insulin lispro Injectable (HumaLOG) 10 Unit(s) SubCutaneous three times a day with meals  isosorbide   dinitrate Tablet (ISORDIL) 10 milliGRAM(s) Oral three times a day  levothyroxine 50 MICROGram(s) Oral daily  metoprolol succinate ER 12.5 milliGRAM(s) Oral daily  milrinone Infusion 0.25 MICROgram(s)/kG/Min (4.275 mL/Hr) IV Continuous <Continuous>  montelukast 10 milliGRAM(s) Oral daily  pantoprazole    Tablet 40 milliGRAM(s) Oral before breakfast  polyethylene glycol 3350 17 Gram(s) Oral two times a day  senna 2 Tablet(s) Oral at bedtime  sodium chloride 0.65% Nasal 1 Spray(s) Both Nostrils three times a day  ticagrelor 90 milliGRAM(s) Oral two times a day    MEDICATIONS  (PRN):  acetaminophen   Tablet .. 650 milliGRAM(s) Oral every 6 hours PRN Mild Pain (1 - 3), Moderate Pain (4 - 6), Severe Pain (7 - 10)  ALPRAZolam 0.25 milliGRAM(s) Oral every 12 hours PRN panic attack  dextrose 40% Gel 15 Gram(s) Oral once PRN Blood Glucose LESS THAN 70 milliGRAM(s)/deciliter  glucagon  Injectable 1 milliGRAM(s) IntraMuscular once PRN Glucose LESS THAN 70 milligrams/deciliter      Vital Signs Last 24 Hrs  T(C): 36.3 (14 May 2019 08:44), Max: 36.7 (13 May 2019 11:56)  T(F): 97.3 (14 May 2019 08:44), Max: 98.1 (13 May 2019 11:56)  HR: 81 (14 May 2019 08:44) (81 - 88)  BP: 107/56 (14 May 2019 08:44) (94/54 - 145/71)  BP(mean): --  RR: 18 (14 May 2019 08:44) (17 - 18)  SpO2: 100% (14 May 2019 08:44) (99% - 100%)  nc/at  s1s2  cta  soft, nt, nd no guarding or rebound  no c/c/e    CBC Full  -  ( 14 May 2019 05:50 )  WBC Count : 9.74 K/uL  RBC Count : 3.18 M/uL  Hemoglobin : 9.1 g/dL  Hematocrit : 29.4 %  Platelet Count - Automated : 337 K/uL  Mean Cell Volume : 92.5 fL  Mean Cell Hemoglobin : 28.6 pg  Mean Cell Hemoglobin Concentration : 31.0 %  Auto Neutrophil # : x  Auto Lymphocyte # : x  Auto Monocyte # : x  Auto Eosinophil # : x  Auto Basophil # : x  Auto Neutrophil % : x  Auto Lymphocyte % : x  Auto Monocyte % : x  Auto Eosinophil % : x  Auto Basophil % : x    05-14    139  |  100  |  70<H>  ----------------------------<  171<H>  3.8   |  22  |  2.04<H>    Ca    9.4      14 May 2019 05:50  Phos  3.1     05-13  Mg     2.3     05-14    TPro  7.4  /  Alb  3.7  /  TBili  0.4  /  DBili  x   /  AST  19  /  ALT  23  /  AlkPhos  69  05-13

## 2019-05-14 NOTE — PROGRESS NOTE ADULT - PROBLEM SELECTOR PLAN 1
Less hypotensive today, s/p decrease in hydralazine yesterday.  Diuresed -1.2 L in 24 hrs.   BUN/Cr improving.   Continue Bumex gtt 0.5 mg/hr.  Will discuss with Dr. Hay about decrease milrione gtt today.   -continue hydral 10 mg po TID.   -Continue isordil 10 mg po TID.  -Continue Toprol 12.5 mg po qd. Will likely increase to 25 mg today.   hypokalemic 3.8, supplemented with KCL 20 meq x 1. Please check BMP q 12 hrs and supplement to keep K 4.0-4.5 and mag 2.0. Less hypotensive today, s/p decrease in hydralazine yesterday.  Diuresed -1.2 L in 24 hrs.   BUN/Cr improving.   Continue Bumex gtt 0.5 mg/hr.  Will discuss with Dr. Hay about decrease milrinone gtt today.   -continue hydral 10 mg po TID.   -Continue Isordil 10 mg po TID.  -Continue Toprol 12.5 mg po qd. Will likely increase to 25 mg today.   hypokalemic 3.8, supplemented with KCL 20 meq x 1. Please check BMP q 12 hrs and supplement to keep K 4.0-4.5 and mag 2.0.

## 2019-05-14 NOTE — PROGRESS NOTE ADULT - SUBJECTIVE AND OBJECTIVE BOX
Chief complaint  Patient is a 73y old  Female who presents with a chief complaint of sob (14 May 2019 16:24)   Review of systems  Patient in bed, looks comfortable, no fever, no hypoglycemia.    Labs and Fingersticks  CAPILLARY BLOOD GLUCOSE      POCT Blood Glucose.: 235 mg/dL (14 May 2019 17:27)  POCT Blood Glucose.: 238 mg/dL (14 May 2019 12:31)  POCT Blood Glucose.: 147 mg/dL (14 May 2019 08:26)  POCT Blood Glucose.: 321 mg/dL (13 May 2019 21:38)      Anion Gap, Serum: 17 <H> (05-14 @ 05:50)  Anion Gap, Serum: 15 <H> (05-13 @ 20:42)  Anion Gap, Serum: 18 <H> (05-13 @ 19:14)  Anion Gap, Serum: 16 <H> (05-13 @ 06:10)      Calcium, Total Serum: 9.4 (05-14 @ 05:50)  Calcium, Total Serum: 9.1 (05-13 @ 20:42)  Calcium, Total Serum: 9.1 (05-13 @ 19:14)  Calcium, Total Serum: 8.6 (05-13 @ 06:10)  Albumin, Serum: 3.7 (05-13 @ 06:10)    Alanine Aminotransferase (ALT/SGPT): 23 (05-13 @ 06:10)  Alkaline Phosphatase, Serum: 69 (05-13 @ 06:10)  Aspartate Aminotransferase (AST/SGOT): 19 (05-13 @ 06:10)        05-14    139  |  100  |  70<H>  ----------------------------<  171<H>  3.8   |  22  |  2.04<H>    Ca    9.4      14 May 2019 05:50  Phos  3.1     05-13  Mg     2.3     05-14    TPro  7.4  /  Alb  3.7  /  TBili  0.4  /  DBili  x   /  AST  19  /  ALT  23  /  AlkPhos  69  05-13                        9.1    9.74  )-----------( 337      ( 14 May 2019 05:50 )             29.4     Medications  MEDICATIONS  (STANDING):  aspirin enteric coated 81 milliGRAM(s) Oral daily  atorvastatin 40 milliGRAM(s) Oral at bedtime  buDESOnide 160 MICROgram(s)/formoterol 4.5 MICROgram(s) Inhaler 2 Puff(s) Inhalation two times a day  buMETAnide Infusion 0.5 mG/Hr (2.5 mL/Hr) IV Continuous <Continuous>  chlorhexidine 4% Liquid 1 Application(s) Topical <User Schedule>  dextrose 5%. 1000 milliLiter(s) (50 mL/Hr) IV Continuous <Continuous>  dextrose 50% Injectable 12.5 Gram(s) IV Push once  dextrose 50% Injectable 25 Gram(s) IV Push once  dextrose 50% Injectable 25 Gram(s) IV Push once  docusate sodium 100 milliGRAM(s) Oral two times a day  ergocalciferol 72179 Unit(s) Oral <User Schedule>  gabapentin 100 milliGRAM(s) Oral two times a day  hydrALAZINE 10 milliGRAM(s) Oral three times a day  influenza   Vaccine 0.5 milliLiter(s) IntraMuscular once  insulin glargine Injectable (LANTUS) 55 Unit(s) SubCutaneous at bedtime  insulin lispro (HumaLOG) corrective regimen sliding scale   SubCutaneous three times a day before meals  insulin lispro (HumaLOG) corrective regimen sliding scale   SubCutaneous at bedtime  insulin lispro Injectable (HumaLOG) 10 Unit(s) SubCutaneous three times a day with meals  isosorbide   dinitrate Tablet (ISORDIL) 10 milliGRAM(s) Oral three times a day  levothyroxine 50 MICROGram(s) Oral daily  metoprolol succinate ER 12.5 milliGRAM(s) Oral daily  milrinone Infusion 0.125 MICROgram(s)/kG/Min (2.138 mL/Hr) IV Continuous <Continuous>  montelukast 10 milliGRAM(s) Oral daily  pantoprazole    Tablet 40 milliGRAM(s) Oral before breakfast  polyethylene glycol 3350 17 Gram(s) Oral two times a day  senna 2 Tablet(s) Oral at bedtime  sodium chloride 0.65% Nasal 1 Spray(s) Both Nostrils three times a day  spironolactone 25 milliGRAM(s) Oral daily  ticagrelor 90 milliGRAM(s) Oral two times a day      Physical Exam  General: Patient comfortable in bed  Vital Signs Last 12 Hrs  T(F): 97.4 (05-14-19 @ 17:36), Max: 97.6 (05-14-19 @ 13:22)  HR: 81 (05-14-19 @ 17:36) (81 - 86)  BP: 107/56 (05-14-19 @ 17:36) (107/56 - 119/63)  BP(mean): --  RR: 18 (05-14-19 @ 17:36) (18 - 18)  SpO2: 100% (05-14-19 @ 17:36) (100% - 100%)  Neck: No palpable thyroid nodules.  CVS: S1S2, No murmurs  Respiratory: No wheezing, no crepitations  GI: Abdomen soft, bowel sounds positive  Musculoskeletal:  edema lower extremities.   Skin: No skin rashes, no ecchymosis    Diagnostics    Thyroid Stimulating Hormone, Serum: AM Sched. Collection: 07-May-2019 06:00 (05-06 @ 10:38)  Free Thyroxine, Serum: AM Sched. Collection: 07-May-2019 06:00 (05-06 @ 10:38)  Cortisol, Serum (ESO): AM Sched. Collection: 15-May-2019 06:00 (05-14 @ 13:01)

## 2019-05-14 NOTE — PROGRESS NOTE ADULT - SUBJECTIVE AND OBJECTIVE BOX
Patient seen and examined. She was up with PT today, was only able to move from bed to chair. She said she felt weak. She will try walking with staff later today.   No acute SOB, orthopnea, PND, CP, palpitations.     Medications:  acetaminophen   Tablet .. 650 milliGRAM(s) Oral every 6 hours PRN  ALPRAZolam 0.25 milliGRAM(s) Oral every 12 hours PRN  aspirin enteric coated 81 milliGRAM(s) Oral daily  atorvastatin 40 milliGRAM(s) Oral at bedtime  buDESOnide 160 MICROgram(s)/formoterol 4.5 MICROgram(s) Inhaler 2 Puff(s) Inhalation two times a day  buMETAnide Infusion 0.5 mG/Hr IV Continuous <Continuous>  chlorhexidine 4% Liquid 1 Application(s) Topical <User Schedule>  dextrose 40% Gel 15 Gram(s) Oral once PRN  dextrose 5%. 1000 milliLiter(s) IV Continuous <Continuous>  dextrose 50% Injectable 12.5 Gram(s) IV Push once  dextrose 50% Injectable 25 Gram(s) IV Push once  dextrose 50% Injectable 25 Gram(s) IV Push once  docusate sodium 100 milliGRAM(s) Oral two times a day  ergocalciferol 46627 Unit(s) Oral <User Schedule>  gabapentin 100 milliGRAM(s) Oral two times a day  glucagon  Injectable 1 milliGRAM(s) IntraMuscular once PRN  hydrALAZINE 10 milliGRAM(s) Oral three times a day  influenza   Vaccine 0.5 milliLiter(s) IntraMuscular once  insulin glargine Injectable (LANTUS) 55 Unit(s) SubCutaneous at bedtime  insulin lispro (HumaLOG) corrective regimen sliding scale   SubCutaneous three times a day before meals  insulin lispro (HumaLOG) corrective regimen sliding scale   SubCutaneous at bedtime  insulin lispro Injectable (HumaLOG) 10 Unit(s) SubCutaneous three times a day with meals  isosorbide   dinitrate Tablet (ISORDIL) 10 milliGRAM(s) Oral three times a day  levothyroxine 50 MICROGram(s) Oral daily  metoprolol succinate ER 12.5 milliGRAM(s) Oral daily  milrinone Infusion 0.25 MICROgram(s)/kG/Min IV Continuous <Continuous>  montelukast 10 milliGRAM(s) Oral daily  pantoprazole    Tablet 40 milliGRAM(s) Oral before breakfast  polyethylene glycol 3350 17 Gram(s) Oral two times a day  potassium chloride    Tablet ER 20 milliEquivalent(s) Oral once  senna 2 Tablet(s) Oral at bedtime  sodium chloride 0.65% Nasal 1 Spray(s) Both Nostrils three times a day  ticagrelor 90 milliGRAM(s) Oral two times a day      Vitals:  Vital Signs Last 24 Hrs  T(C): 36.3 (14 May 2019 08:44), Max: 36.7 (13 May 2019 17:29)  T(F): 97.3 (14 May 2019 08:44), Max: 98.1 (13 May 2019 17:29)  HR: 81 (14 May 2019 08:44) (81 - 88)  BP: 107/56 (14 May 2019 08:44) (94/54 - 107/56)  BP(mean): --  RR: 18 (14 May 2019 08:44) (17 - 18)  SpO2: 100% (14 May 2019 08:44) (99% - 100%)    Daily     Daily Weight in k (14 May 2019 04:07)    I&O's Detail    13 May 2019 07:01  -  14 May 2019 07:00  --------------------------------------------------------  IN:    bumetanide Infusion: 30 mL    milrinone  Infusion: 51.3 mL    Oral Fluid: 900 mL  Total IN: 981.3 mL    OUT:    Intermittent Catheterization - Urethral: 2200 mL  Total OUT: 2200 mL    Total NET: -1218.7 mL          Physical Exam:     General: No distress. Comfortable.  HEENT: EOM intact.  Neck: Neck supple. JVP mildly elevated. No masses  Chest: Clear to auscultation bilaterally  CV: Normal S1 and S2. II/VI YUSUF. No rub, or gallops. Radial pulses normal. No LE edema b/l.   Abdomen: Soft, non-distended, non-tender  Skin: No rashes or skin breakdown  Neurology: Alert and oriented times three. Sensation intact  Psych: Affect normal    Labs:                        9.1    9.74  )-----------( 337      ( 14 May 2019 05:50 )             29.4     05-14    139  |  100  |  70<H>  ----------------------------<  171<H>  3.8   |  22  |  2.04<H>    Ca    9.4      14 May 2019 05:50  Phos  3.1       Mg     2.3         TPro  7.4  /  Alb  3.7  /  TBili  0.4  /  DBili  x   /  AST  19  /  ALT  23  /  AlkPhos  69        < from: Transesophageal Echocardiogram w/o TTE (19 @ 18:25) >  OBSERVATIONS:  Mitral Valve: Mitral annular calcification and calcified  mitral leaflets which are tethered. The posterior mitral  leaflet is particularly tethered.  Severe mitral  regurgitation which originates along the coaptation line.  Aortic Root: Normal aortic root, aortic arch and descending  thoracic aorta.  Aortic Valve: Calcified trileaflet aortic valve with normal  opening.  Left Atrium: Left atrial enlargement. Left atrial appendage  could not be visualized, unclear if it was ligated during  prior CABG.  Left Ventricle: Severe  left ventricular systolic  dysfunction. Left ventricular enlargement.  Right Heart: Normal right atrium. Right ventricular  enlargement with decreased right ventricular systolic  function. Normal tricuspid valve. Normal pulmonic valve.  Pericardium/PleuraNormal pericardium with no pericardial  effusion.  Hemodynamic: Agitated saline injection demonstrates  evidence of a patent foramen ovale with left to right flow.  ------------------------------------------------------------------------    < end of copied text >

## 2019-05-14 NOTE — PROGRESS NOTE ADULT - SUBJECTIVE AND OBJECTIVE BOX
Interval History: Denies any chest pain, palpitations, dyspnea overnight.     Review Of Systems:  Constitutional: [ ] Fever [ ] Chills [ ] Fatigue [ ] Weight change   HEENT: [ ] Blurred vision [ ] Eye Pain [ ] Headache [ ] Runny nose [ ] Sore Throat   Respiratory: [ ] Cough [ ] Wheezing [ ] Shortness of breath  Cardiovascular: [ ] Chest Pain [ ] Palpitations [ ] ZEE [ ] PND [ ] Orthopnea  Gastrointestinal: [ ] Abdominal Pain [ ] Diarrhea [ ] Constipation [ ] Hemorrhoids [ ] Nausea [ ] Vomiting  Genitourinary: [ ] Nocturia [ ] Dysuria [ ] Incontinence  Extremities: [ ] Swelling [ ] Joint Pain  Neurologic: [ ] Focal deficit [ ] Paresthesias [ ] Syncope  Lymphatic: [ ] Swelling [ ] Lymphadenopathy   Skin: [ ] Rash [ ] Ecchymoses [ ] Wounds [ ] Lesions  Psychiatry: [ ] Depression [ ] Suicidal/Homicidal Ideation [ ] Anxiety [ ] Sleep Disturbances  [x ] 10 point review of systems is otherwise negative except as mentioned above            [ ]Unable to obtain    Medications:  acetaminophen   Tablet .. 650 milliGRAM(s) Oral every 6 hours PRN  ALPRAZolam 0.25 milliGRAM(s) Oral every 12 hours PRN  aspirin enteric coated 81 milliGRAM(s) Oral daily  atorvastatin 40 milliGRAM(s) Oral at bedtime  buDESOnide 160 MICROgram(s)/formoterol 4.5 MICROgram(s) Inhaler 2 Puff(s) Inhalation two times a day  buMETAnide Infusion 0.5 mG/Hr IV Continuous <Continuous>  chlorhexidine 4% Liquid 1 Application(s) Topical <User Schedule>  dextrose 40% Gel 15 Gram(s) Oral once PRN  dextrose 5%. 1000 milliLiter(s) IV Continuous <Continuous>  dextrose 50% Injectable 12.5 Gram(s) IV Push once  dextrose 50% Injectable 25 Gram(s) IV Push once  dextrose 50% Injectable 25 Gram(s) IV Push once  docusate sodium 100 milliGRAM(s) Oral two times a day  ergocalciferol 53102 Unit(s) Oral <User Schedule>  gabapentin 100 milliGRAM(s) Oral two times a day  glucagon  Injectable 1 milliGRAM(s) IntraMuscular once PRN  hydrALAZINE 10 milliGRAM(s) Oral three times a day  influenza   Vaccine 0.5 milliLiter(s) IntraMuscular once  insulin glargine Injectable (LANTUS) 55 Unit(s) SubCutaneous at bedtime  insulin lispro (HumaLOG) corrective regimen sliding scale   SubCutaneous three times a day before meals  insulin lispro (HumaLOG) corrective regimen sliding scale   SubCutaneous at bedtime  insulin lispro Injectable (HumaLOG) 10 Unit(s) SubCutaneous three times a day with meals  isosorbide   dinitrate Tablet (ISORDIL) 10 milliGRAM(s) Oral three times a day  levothyroxine 50 MICROGram(s) Oral daily  metoprolol succinate ER 12.5 milliGRAM(s) Oral daily  milrinone Infusion 0.25 MICROgram(s)/kG/Min IV Continuous <Continuous>  montelukast 10 milliGRAM(s) Oral daily  pantoprazole    Tablet 40 milliGRAM(s) Oral before breakfast  polyethylene glycol 3350 17 Gram(s) Oral two times a day  senna 2 Tablet(s) Oral at bedtime  sodium chloride 0.65% Nasal 1 Spray(s) Both Nostrils three times a day  ticagrelor 90 milliGRAM(s) Oral two times a day    Vitals:  ICU Vital Signs Last 24 Hrs  T(C): 36.3 (14 May 2019 08:44), Max: 36.7 (13 May 2019 11:56)  T(F): 97.3 (14 May 2019 08:44), Max: 98.1 (13 May 2019 11:56)  HR: 81 (14 May 2019 08:44) (81 - 88)  BP: 107/56 (14 May 2019 08:44) (94/54 - 145/71)  BP(mean): --  ABP: --  ABP(mean): --  RR: 18 (14 May 2019 08:44) (17 - 18)  SpO2: 100% (14 May 2019 08:44) (99% - 100%)    Daily     Daily Weight in k (14 May 2019 04:07)  I&O's Summary    13 May 2019 07:01  -  14 May 2019 07:00  --------------------------------------------------------  IN: 981.3 mL / OUT: 2200 mL / NET: -1218.7 mL    Physical Exam:  Appearance:  NAD [  ] Intubated [  ] Sedated  HENT: NC/AT  Cardiovascular: S1, S2, [  ] LE Edema Present, [  ] JVD Present  Respiratory: Clear to auscultation bilaterally,  [   ] Transmitted Breath Sounds From Vent/Trach  Gastrointestinal: Soft, Non-tender, Non-distended, BS+  Psychiatry: [x  ] AAOx3  [  x ] Follows Commands  [   ] Unable to assess  Skin: Intact,  [  ] Line Sites CDI, [  ] Wound sites CDI    Labs:                        9.1    9.74  )-----------( 337      ( 14 May 2019 05:50 )             29.4         139  |  100  |  70<H>  ----------------------------<  171<H>  3.8   |  22  |  2.04<H>    Ca    9.4      14 May 2019 05:50  Phos  3.1       Mg     2.3         TPro  7.4  /  Alb  3.7  /  TBili  0.4  /  DBili  x   /  AST  19  /  ALT  23  /  AlkPhos  69      Culture - Blood (collected 19 @ 07:40)  Source: BLOOD  Preliminary Report (19 @ 07:42):    NO ORGANISMS ISOLATED    NO ORGANISMS ISOLATED AT 24 HOURS    Culture - Blood (collected 19 @ 18:37)  Source: BLOOD PERIPHERAL  Preliminary Report (19 @ 18:39):    NO ORGANISMS ISOLATED    NO ORGANISMS ISOLATED AT 24 HOURS    Culture - Blood (collected 19 @ 18:37)  Source: BLOOD PERIPHERAL  Preliminary Report (19 @ 18:39):    NO ORGANISMS ISOLATED    NO ORGANISMS ISOLATED AT 24 HOURS    Interpretation of Telemetry: SR / PVCs

## 2019-05-14 NOTE — PROGRESS NOTE ADULT - ATTENDING COMMENTS
Decrease milrinone to 0.125 mcg/kg/min.  Continue Bumex gtt for now.  Start spironolactone 25 qd.  Likely d/c milrinone tomorrow.  Aim for d/c (to rehab?) on Friday. Decrease milrinone to 0.125 mcg/kg/min.  Continue Bumex gtt for now.  Start spironolactone 25 qd.  Likely d/c milrinone tomorrow.  Consider psych eval for major depression.  Aim for d/c (to rehab?) on Friday.

## 2019-05-14 NOTE — PROGRESS NOTE ADULT - PROBLEM SELECTOR PLAN 1
She is on diuretics: awaiting TRANSFER TO [facility]: RECED EXTRAS DOSES OF BUMEX TODAY  5/10: pt is in [facility]: cath noted: being aggressively diuresed: Most of her breathing is secondary to chf: May use bipap for resp support if required:  5/13: events noted: in chf: has severe MR: on milrinone as wellas diuretics: she still seems SOB: Now family wants evaluation for surgery : She has not been wheezing: Would c ont BD!  5/14: breathing wise reasonable: needs to walk: no wheezing!cont cymbisort

## 2019-05-15 LAB
ANION GAP SERPL CALC-SCNC: 13 MMO/L — SIGNIFICANT CHANGE UP (ref 7–14)
ANION GAP SERPL CALC-SCNC: 15 MMO/L — HIGH (ref 7–14)
BUN SERPL-MCNC: 64 MG/DL — HIGH (ref 7–23)
BUN SERPL-MCNC: 68 MG/DL — HIGH (ref 7–23)
CALCIUM SERPL-MCNC: 10 MG/DL — SIGNIFICANT CHANGE UP (ref 8.4–10.5)
CALCIUM SERPL-MCNC: 9.5 MG/DL — SIGNIFICANT CHANGE UP (ref 8.4–10.5)
CHLORIDE SERPL-SCNC: 101 MMOL/L — SIGNIFICANT CHANGE UP (ref 98–107)
CHLORIDE SERPL-SCNC: 101 MMOL/L — SIGNIFICANT CHANGE UP (ref 98–107)
CO2 SERPL-SCNC: 19 MMOL/L — LOW (ref 22–31)
CO2 SERPL-SCNC: 23 MMOL/L — SIGNIFICANT CHANGE UP (ref 22–31)
CORTIS SERPL-MCNC: 14.1 UG/DL — SIGNIFICANT CHANGE UP (ref 2.7–18.4)
CREAT SERPL-MCNC: 1.93 MG/DL — HIGH (ref 0.5–1.3)
CREAT SERPL-MCNC: 2.04 MG/DL — HIGH (ref 0.5–1.3)
GLUCOSE SERPL-MCNC: 266 MG/DL — HIGH (ref 70–99)
GLUCOSE SERPL-MCNC: 304 MG/DL — HIGH (ref 70–99)
HCT VFR BLD CALC: 28.3 % — LOW (ref 34.5–45)
HGB BLD-MCNC: 8.8 G/DL — LOW (ref 11.5–15.5)
MAGNESIUM SERPL-MCNC: 2.1 MG/DL — SIGNIFICANT CHANGE UP (ref 1.6–2.6)
MAGNESIUM SERPL-MCNC: 2.2 MG/DL — SIGNIFICANT CHANGE UP (ref 1.6–2.6)
MCHC RBC-ENTMCNC: 28.3 PG — SIGNIFICANT CHANGE UP (ref 27–34)
MCHC RBC-ENTMCNC: 31.1 % — LOW (ref 32–36)
MCV RBC AUTO: 91 FL — SIGNIFICANT CHANGE UP (ref 80–100)
NRBC # FLD: 0 K/UL — SIGNIFICANT CHANGE UP (ref 0–0)
PLATELET # BLD AUTO: 335 K/UL — SIGNIFICANT CHANGE UP (ref 150–400)
PMV BLD: 9.4 FL — SIGNIFICANT CHANGE UP (ref 7–13)
POTASSIUM SERPL-MCNC: 3.8 MMOL/L — SIGNIFICANT CHANGE UP (ref 3.5–5.3)
POTASSIUM SERPL-MCNC: 4 MMOL/L — SIGNIFICANT CHANGE UP (ref 3.5–5.3)
POTASSIUM SERPL-SCNC: 3.8 MMOL/L — SIGNIFICANT CHANGE UP (ref 3.5–5.3)
POTASSIUM SERPL-SCNC: 4 MMOL/L — SIGNIFICANT CHANGE UP (ref 3.5–5.3)
RBC # BLD: 3.11 M/UL — LOW (ref 3.8–5.2)
RBC # FLD: 16.1 % — HIGH (ref 10.3–14.5)
SODIUM SERPL-SCNC: 135 MMOL/L — SIGNIFICANT CHANGE UP (ref 135–145)
SODIUM SERPL-SCNC: 137 MMOL/L — SIGNIFICANT CHANGE UP (ref 135–145)
WBC # BLD: 8.87 K/UL — SIGNIFICANT CHANGE UP (ref 3.8–10.5)
WBC # FLD AUTO: 8.87 K/UL — SIGNIFICANT CHANGE UP (ref 3.8–10.5)

## 2019-05-15 PROCEDURE — 93010 ELECTROCARDIOGRAM REPORT: CPT

## 2019-05-15 PROCEDURE — 99232 SBSQ HOSP IP/OBS MODERATE 35: CPT

## 2019-05-15 RX ORDER — BUMETANIDE 0.25 MG/ML
4 INJECTION INTRAMUSCULAR; INTRAVENOUS
Refills: 0 | Status: DISCONTINUED | OUTPATIENT
Start: 2019-05-15 | End: 2019-05-16

## 2019-05-15 RX ORDER — HYDRALAZINE HCL 50 MG
20 TABLET ORAL THREE TIMES A DAY
Refills: 0 | Status: DISCONTINUED | OUTPATIENT
Start: 2019-05-15 | End: 2019-05-16

## 2019-05-15 RX ORDER — BUMETANIDE 0.25 MG/ML
4 INJECTION INTRAMUSCULAR; INTRAVENOUS
Refills: 0 | Status: DISCONTINUED | OUTPATIENT
Start: 2019-05-15 | End: 2019-05-15

## 2019-05-15 RX ORDER — POTASSIUM CHLORIDE 20 MEQ
20 PACKET (EA) ORAL ONCE
Refills: 0 | Status: COMPLETED | OUTPATIENT
Start: 2019-05-15 | End: 2019-05-15

## 2019-05-15 RX ADMIN — Medication 20 MILLIGRAM(S): at 22:16

## 2019-05-15 RX ADMIN — Medication 3: at 09:06

## 2019-05-15 RX ADMIN — TICAGRELOR 90 MILLIGRAM(S): 90 TABLET ORAL at 05:57

## 2019-05-15 RX ADMIN — Medication 81 MILLIGRAM(S): at 12:35

## 2019-05-15 RX ADMIN — BUMETANIDE 4 MILLIGRAM(S): 0.25 INJECTION INTRAMUSCULAR; INTRAVENOUS at 20:17

## 2019-05-15 RX ADMIN — ISOSORBIDE DINITRATE 10 MILLIGRAM(S): 5 TABLET ORAL at 01:45

## 2019-05-15 RX ADMIN — PANTOPRAZOLE SODIUM 40 MILLIGRAM(S): 20 TABLET, DELAYED RELEASE ORAL at 05:53

## 2019-05-15 RX ADMIN — TICAGRELOR 90 MILLIGRAM(S): 90 TABLET ORAL at 18:23

## 2019-05-15 RX ADMIN — Medication 4: at 17:10

## 2019-05-15 RX ADMIN — Medication 1 SPRAY(S): at 05:56

## 2019-05-15 RX ADMIN — Medication 10 MILLIGRAM(S): at 14:29

## 2019-05-15 RX ADMIN — CHLORHEXIDINE GLUCONATE 1 APPLICATION(S): 213 SOLUTION TOPICAL at 12:35

## 2019-05-15 RX ADMIN — Medication 20 MILLIEQUIVALENT(S): at 12:35

## 2019-05-15 RX ADMIN — Medication 10 UNIT(S): at 12:35

## 2019-05-15 RX ADMIN — GABAPENTIN 100 MILLIGRAM(S): 400 CAPSULE ORAL at 05:53

## 2019-05-15 RX ADMIN — BUDESONIDE AND FORMOTEROL FUMARATE DIHYDRATE 2 PUFF(S): 160; 4.5 AEROSOL RESPIRATORY (INHALATION) at 09:09

## 2019-05-15 RX ADMIN — Medication 50 MICROGRAM(S): at 05:55

## 2019-05-15 RX ADMIN — BUMETANIDE 2.5 MG/HR: 0.25 INJECTION INTRAMUSCULAR; INTRAVENOUS at 09:07

## 2019-05-15 RX ADMIN — MONTELUKAST 10 MILLIGRAM(S): 4 TABLET, CHEWABLE ORAL at 12:35

## 2019-05-15 RX ADMIN — SPIRONOLACTONE 25 MILLIGRAM(S): 25 TABLET, FILM COATED ORAL at 05:56

## 2019-05-15 RX ADMIN — Medication 1 SPRAY(S): at 22:17

## 2019-05-15 RX ADMIN — Medication 100 MILLIGRAM(S): at 18:23

## 2019-05-15 RX ADMIN — Medication 10 UNIT(S): at 17:09

## 2019-05-15 RX ADMIN — BUDESONIDE AND FORMOTEROL FUMARATE DIHYDRATE 2 PUFF(S): 160; 4.5 AEROSOL RESPIRATORY (INHALATION) at 20:16

## 2019-05-15 RX ADMIN — GABAPENTIN 100 MILLIGRAM(S): 400 CAPSULE ORAL at 18:23

## 2019-05-15 RX ADMIN — ATORVASTATIN CALCIUM 40 MILLIGRAM(S): 80 TABLET, FILM COATED ORAL at 22:16

## 2019-05-15 RX ADMIN — Medication 10 MILLIGRAM(S): at 05:55

## 2019-05-15 RX ADMIN — MILRINONE LACTATE 2.14 MICROGRAM(S)/KG/MIN: 1 INJECTION, SOLUTION INTRAVENOUS at 09:07

## 2019-05-15 RX ADMIN — Medication 10 UNIT(S): at 09:07

## 2019-05-15 RX ADMIN — Medication 2: at 12:34

## 2019-05-15 RX ADMIN — Medication 12.5 MILLIGRAM(S): at 05:55

## 2019-05-15 RX ADMIN — ISOSORBIDE DINITRATE 10 MILLIGRAM(S): 5 TABLET ORAL at 09:09

## 2019-05-15 RX ADMIN — Medication 1 SPRAY(S): at 14:29

## 2019-05-15 RX ADMIN — ISOSORBIDE DINITRATE 10 MILLIGRAM(S): 5 TABLET ORAL at 20:19

## 2019-05-15 RX ADMIN — Medication 100 MILLIGRAM(S): at 05:55

## 2019-05-15 NOTE — PROGRESS NOTE ADULT - PROBLEM SELECTOR PLAN 1
Will continue current insulin regimen for now. Will continue monitoring FS, log, will Follow up.   if sugars remain high will increase bolus insulin.  Patient counseled for compliance with consistent low carb diet.   Patient counseled for compliance with consistent low carb diet.

## 2019-05-15 NOTE — CHART NOTE - NSCHARTNOTEFT_GEN_A_CORE
spoke to patient about having psychiatry see her, she declines and says that she will follow up as outpatient.

## 2019-05-15 NOTE — PROGRESS NOTE ADULT - PROBLEM SELECTOR PLAN 2
cont current medications including diuretics: for ? surgical option now ? alonso clip  5/14; Now plan is to dc to rehab first and outpt evalution for alonso clip  5/15: Being agressively treated for Heart failure: ELISABETH CHFt vanessam

## 2019-05-15 NOTE — PROGRESS NOTE ADULT - SUBJECTIVE AND OBJECTIVE BOX
[facility] [place] NEPHROLOGY PRACTICE   MD RAHEEL SHAH MD ANGELA WONG, PA    TEL:  OFFICE: [phone]  DR SANTOYO CELL: [phone]  DR. NGUYEN CELL: [phone]  UMM KENDALL CELL: [phone]        Patient is a 73y old  Female who presents with a chief complaint of sob (15 May 2019 11:55)      Patient seen and examined at bedside. No chest pain/sob    VITALS:  T(F): 98 (05-15-19 @ 09:13), Max: 98 (05-14-19 @ 20:51)  HR: 76 (05-15-19 @ 09:13)  BP: 107/56 (05-15-19 @ 09:13)  RR: 18 (05-15-19 @ 09:13)  SpO2: 100% (05-15-19 @ 09:13)  Wt(kg): --    05-14 @ 07:01  -  05-15 @ 07:00  --------------------------------------------------------  IN: 200 mL / OUT: 1320 mL / NET: -1120 mL    05-15 @ 07:01  -  05-15 @ 12:00  --------------------------------------------------------  IN: 0 mL / OUT: 430 mL / NET: -430 mL          PHYSICAL EXAM:  Constitutional: NAD  Neck: No JVD  Respiratory: CTAB, no wheezes, rales or rhonchi  Cardiovascular: S1, S2, RRR  Gastrointestinal: BS+, soft, NT/ND  Extremities: No peripheral edema    Hospital Medications:   MEDICATIONS  (STANDING):  aspirin enteric coated 81 milliGRAM(s) Oral daily  atorvastatin 40 milliGRAM(s) Oral at bedtime  buDESOnide 160 MICROgram(s)/formoterol 4.5 MICROgram(s) Inhaler 2 Puff(s) Inhalation two times a day  buMETAnide IVPB 4 milliGRAM(s) IV Intermittent two times a day  chlorhexidine 4% Liquid 1 Application(s) Topical <User Schedule>  dextrose 5%. 1000 milliLiter(s) (50 mL/Hr) IV Continuous <Continuous>  dextrose 50% Injectable 12.5 Gram(s) IV Push once  dextrose 50% Injectable 25 Gram(s) IV Push once  dextrose 50% Injectable 25 Gram(s) IV Push once  docusate sodium 100 milliGRAM(s) Oral two times a day  ergocalciferol 25143 Unit(s) Oral <User Schedule>  gabapentin 100 milliGRAM(s) Oral two times a day  hydrALAZINE 10 milliGRAM(s) Oral three times a day  influenza   Vaccine 0.5 milliLiter(s) IntraMuscular once  insulin glargine Injectable (LANTUS) 55 Unit(s) SubCutaneous at bedtime  insulin lispro (HumaLOG) corrective regimen sliding scale   SubCutaneous three times a day before meals  insulin lispro (HumaLOG) corrective regimen sliding scale   SubCutaneous at bedtime  insulin lispro Injectable (HumaLOG) 10 Unit(s) SubCutaneous three times a day with meals  isosorbide   dinitrate Tablet (ISORDIL) 10 milliGRAM(s) Oral three times a day  levothyroxine 50 MICROGram(s) Oral daily  metoprolol succinate ER 12.5 milliGRAM(s) Oral daily  montelukast 10 milliGRAM(s) Oral daily  pantoprazole    Tablet 40 milliGRAM(s) Oral before breakfast  polyethylene glycol 3350 17 Gram(s) Oral two times a day  potassium chloride    Tablet ER 20 milliEquivalent(s) Oral once  senna 2 Tablet(s) Oral at bedtime  sodium chloride 0.65% Nasal 1 Spray(s) Both Nostrils three times a day  spironolactone 25 milliGRAM(s) Oral daily  ticagrelor 90 milliGRAM(s) Oral two times a day      LABS:  05-15    137  |  101  |  64<H>  ----------------------------<  266<H>  3.8   |  23  |  1.93<H>    Ca    9.5      15 May 2019 05:40  Mg     2.2     05-15      Creatinine Trend: 1.93 <--, 1.92 <--, 2.04 <--, 2.11 <--, 2.11 <--, 2.09 <--, 2.37 <--, 2.31 <--, 2.37 <--, 2.36 <--, 2.40 <--                                8.8    8.87  )-----------( 335      ( 15 May 2019 05:40 )             28.3     Urine Studies:  Urinalysis - [05-10-19 @ 09:30]      Color LIGHT YELLOW / Appearance CLEAR / SG 1.010 / pH 6.0      Gluc NEGATIVE / Ketone NEGATIVE  / Bili NEGATIVE / Urobili NORMAL       Blood NEGATIVE / Protein NEGATIVE / Leuk Est NEGATIVE / Nitrite NEGATIVE      RBC  / WBC  / Hyaline  / Gran  / Sq Epi  / Non Sq Epi  / Bacteria       .4 (Ca --)      [04-25-19 @ 05:45]   --  PTH 43.55 (Ca --)      [09-10-18 @ 07:15]   --  PTH 36.60 (Ca --)      [09-08-18 @ 03:15]   --  Vitamin D (25OH) 25.9      [05-01-19 @ 04:00]  HbA1c 7.3      [04-25-19 @ 05:45]  TSH 3.44      [05-07-19 @ 07:00]  Lipid: chol 127, , HDL 22, LDL 67      [04-24-19 @ 12:21]    HCV 0.06, Nonreactive Hepatitis C AB  S/CO Ratio                        Interpretation  < 1.00                                   Non-Reactive  1.00 - 4.99                         Weakly-Reactive  > 5.00                                Reactive  Non-Reactive: A person with a non-reactive HCV antibody  result is considered uninfected.  No further action is  needed unless recent infection is suspected.  In these  cases, consider repeat testing later to detect  seroconversion..  Weakly-Reactive: HCV antibody test is abnormal, HCV RNA  Qualitative test will follow.  Reactive: HCV antibody test is abnormal, HCV RNA  Qualitative test will follow.  Note: HCV antibody testing is performed on the [employer] system.      [04-25-19 @ 05:45]      RADIOLOGY & ADDITIONAL STUDIES: [facility] [place] [facility]ROLOGY PRACTICE   MD RAHEEL SHAH MD ANGELA WONG, PA    TEL:  OFFICE: [phone]  DR SANTOYO CELL: [phone]  DR. NGUYEN CELL: [phone]  UMM KENDALL CELL: [phone]        Patient is a 73y old  Female who presents with a chief complaint of sob (15 May 2019 11:55)      Patient seen and examined at bedside. No chest pain/sob. feeling better     VITALS:  T(F): 98 (05-15-19 @ 09:13), Max: 98 (05-14-19 @ 20:51)  HR: 76 (05-15-19 @ 09:13)  BP: 107/56 (05-15-19 @ 09:13)  RR: 18 (05-15-19 @ 09:13)  SpO2: 100% (05-15-19 @ 09:13)  Wt(kg): --    05-14 @ 07:01  -  05-15 @ 07:00  --------------------------------------------------------  IN: 200 mL / OUT: 1320 mL / NET: -1120 mL    05-15 @ 07:01  -  05-15 @ 12:00  --------------------------------------------------------  IN: 0 mL / OUT: 430 mL / NET: -430 mL          PHYSICAL EXAM:  Constitutional: NAD  Neck: No JVD  Respiratory: b/l crackles  Cardiovascular: S1, S2, RRR  Gastrointestinal: BS+, soft, NT/ND  Extremities: No peripheral edema    Hospital Medications:   MEDICATIONS  (STANDING):  aspirin enteric coated 81 milliGRAM(s) Oral daily  atorvastatin 40 milliGRAM(s) Oral at bedtime  buDESOnide 160 MICROgram(s)/formoterol 4.5 MICROgram(s) Inhaler 2 Puff(s) Inhalation two times a day  buMETAnide IVPB 4 milliGRAM(s) IV Intermittent two times a day  chlorhexidine 4% Liquid 1 Application(s) Topical <User Schedule>  dextrose 5%. 1000 milliLiter(s) (50 mL/Hr) IV Continuous <Continuous>  dextrose 50% Injectable 12.5 Gram(s) IV Push once  dextrose 50% Injectable 25 Gram(s) IV Push once  dextrose 50% Injectable 25 Gram(s) IV Push once  docusate sodium 100 milliGRAM(s) Oral two times a day  ergocalciferol 88654 Unit(s) Oral <User Schedule>  gabapentin 100 milliGRAM(s) Oral two times a day  hydrALAZINE 10 milliGRAM(s) Oral three times a day  influenza   Vaccine 0.5 milliLiter(s) IntraMuscular once  insulin glargine Injectable (LANTUS) 55 Unit(s) SubCutaneous at bedtime  insulin lispro (HumaLOG) corrective regimen sliding scale   SubCutaneous three times a day before meals  insulin lispro (HumaLOG) corrective regimen sliding scale   SubCutaneous at bedtime  insulin lispro Injectable (HumaLOG) 10 Unit(s) SubCutaneous three times a day with meals  isosorbide   dinitrate Tablet (ISORDIL) 10 milliGRAM(s) Oral three times a day  levothyroxine 50 MICROGram(s) Oral daily  metoprolol succinate ER 12.5 milliGRAM(s) Oral daily  montelukast 10 milliGRAM(s) Oral daily  pantoprazole    Tablet 40 milliGRAM(s) Oral before breakfast  polyethylene glycol 3350 17 Gram(s) Oral two times a day  potassium chloride    Tablet ER 20 milliEquivalent(s) Oral once  senna 2 Tablet(s) Oral at bedtime  sodium chloride 0.65% Nasal 1 Spray(s) Both Nostrils three times a day  spironolactone 25 milliGRAM(s) Oral daily  ticagrelor 90 milliGRAM(s) Oral two times a day      LABS:  05-15    137  |  101  |  64<H>  ----------------------------<  266<H>  3.8   |  23  |  1.93<H>    Ca    9.5      15 May 2019 05:40  Mg     2.2     05-15      Creatinine Trend: 1.93 <--, 1.92 <--, 2.04 <--, 2.11 <--, 2.11 <--, 2.09 <--, 2.37 <--, 2.31 <--, 2.37 <--, 2.36 <--, 2.40 <--                                8.8    8.87  )-----------( 335      ( 15 May 2019 05:40 )             28.3     Urine Studies:  Urinalysis - [05-10-19 @ 09:30]      Color LIGHT YELLOW / Appearance CLEAR / SG 1.010 / pH 6.0      Gluc NEGATIVE / Ketone NEGATIVE  / Bili NEGATIVE / Urobili NORMAL       Blood NEGATIVE / Protein NEGATIVE / Leuk Est NEGATIVE / Nitrite NEGATIVE      RBC  / WBC  / Hyaline  / Gran  / Sq Epi  / Non Sq Epi  / Bacteria       .4 (Ca --)      [04-25-19 @ 05:45]   --  PTH 43.55 (Ca --)      [09-10-18 @ 07:15]   --  PTH 36.60 (Ca --)      [09-08-18 @ 03:15]   --  Vitamin D (25OH) 25.9      [05-01-19 @ 04:00]  HbA1c 7.3      [04-25-19 @ 05:45]  TSH 3.44      [05-07-19 @ 07:00]  Lipid: chol 127, , HDL 22, LDL 67      [04-24-19 @ 12:21]    HCV 0.06, Nonreactive Hepatitis C AB  S/CO Ratio                        Interpretation  < 1.00                                   Non-Reactive  1.00 - 4.99                         Weakly-Reactive  > 5.00                                Reactive  Non-Reactive: A person with a non-reactive HCV antibody  result is considered uninfected.  No further action is  needed unless recent infection is suspected.  In these  cases, consider repeat testing later to detect  seroconversion..  Weakly-Reactive: HCV antibody test is abnormal, HCV RNA  Qualitative test will follow.  Reactive: HCV antibody test is abnormal, HCV RNA  Qualitative test will follow.  Note: HCV antibody testing is performed on the [employer] system.      [04-25-19 @ 05:45]      RADIOLOGY & ADDITIONAL STUDIES:

## 2019-05-15 NOTE — PROGRESS NOTE ADULT - SUBJECTIVE AND OBJECTIVE BOX
SELVIN CLAIRE:[id number],   73yFemale followed for:  azithromycin (Hives; Pruritus)    PAST MEDICAL & SURGICAL HISTORY:  GERD (gastroesophageal reflux disease)  CAD (coronary artery disease): s/p CABG  Hypothyroidism  Hyperlipidemia  Diabetes mellitus, type 2  S/P CABG (coronary artery bypass graft)    FAMILY HISTORY:  Family history of hypertension (Sibling): sister  Family history of diabetes mellitus (Sibling): brothers/sisters    MEDICATIONS  (STANDING):  aspirin enteric coated 81 milliGRAM(s) Oral daily  atorvastatin 40 milliGRAM(s) Oral at bedtime  buDESOnide 160 MICROgram(s)/formoterol 4.5 MICROgram(s) Inhaler 2 Puff(s) Inhalation two times a day  buMETAnide IVPB 4 milliGRAM(s) IV Intermittent two times a day  chlorhexidine 4% Liquid 1 Application(s) Topical <User Schedule>  dextrose 5%. 1000 milliLiter(s) (50 mL/Hr) IV Continuous <Continuous>  dextrose 50% Injectable 12.5 Gram(s) IV Push once  dextrose 50% Injectable 25 Gram(s) IV Push once  dextrose 50% Injectable 25 Gram(s) IV Push once  docusate sodium 100 milliGRAM(s) Oral two times a day  ergocalciferol 89520 Unit(s) Oral <User Schedule>  gabapentin 100 milliGRAM(s) Oral two times a day  hydrALAZINE 10 milliGRAM(s) Oral three times a day  influenza   Vaccine 0.5 milliLiter(s) IntraMuscular once  insulin glargine Injectable (LANTUS) 55 Unit(s) SubCutaneous at bedtime  insulin lispro (HumaLOG) corrective regimen sliding scale   SubCutaneous three times a day before meals  insulin lispro (HumaLOG) corrective regimen sliding scale   SubCutaneous at bedtime  insulin lispro Injectable (HumaLOG) 10 Unit(s) SubCutaneous three times a day with meals  isosorbide   dinitrate Tablet (ISORDIL) 10 milliGRAM(s) Oral three times a day  levothyroxine 50 MICROGram(s) Oral daily  metoprolol succinate ER 12.5 milliGRAM(s) Oral daily  montelukast 10 milliGRAM(s) Oral daily  pantoprazole    Tablet 40 milliGRAM(s) Oral before breakfast  polyethylene glycol 3350 17 Gram(s) Oral two times a day  potassium chloride    Tablet ER 20 milliEquivalent(s) Oral once  senna 2 Tablet(s) Oral at bedtime  sodium chloride 0.65% Nasal 1 Spray(s) Both Nostrils three times a day  spironolactone 25 milliGRAM(s) Oral daily  ticagrelor 90 milliGRAM(s) Oral two times a day    MEDICATIONS  (PRN):  acetaminophen   Tablet .. 650 milliGRAM(s) Oral every 6 hours PRN Mild Pain (1 - 3), Moderate Pain (4 - 6), Severe Pain (7 - 10)  ALPRAZolam 0.25 milliGRAM(s) Oral every 12 hours PRN panic attack  dextrose 40% Gel 15 Gram(s) Oral once PRN Blood Glucose LESS THAN 70 milliGRAM(s)/deciliter  glucagon  Injectable 1 milliGRAM(s) IntraMuscular once PRN Glucose LESS THAN 70 milligrams/deciliter      Vital Signs Last 24 Hrs  T(C): 36.7 (15 May 2019 09:13), Max: 36.7 (14 May 2019 20:51)  T(F): 98 (15 May 2019 09:13), Max: 98 (14 May 2019 20:51)  HR: 76 (15 May 2019 09:13) (74 - 86)  BP: 107/56 (15 May 2019 09:13) (103/57 - 119/63)  BP(mean): --  RR: 18 (15 May 2019 09:13) (18 - 18)  SpO2: 100% (15 May 2019 09:13) (100% - 100%)  nc/at  s1s2  cta  soft, nt, nd no guarding or rebound  no c/c/e    CBC Full  -  ( 15 May 2019 05:40 )  WBC Count : 8.87 K/uL  RBC Count : 3.11 M/uL  Hemoglobin : 8.8 g/dL  Hematocrit : 28.3 %  Platelet Count - Automated : 335 K/uL  Mean Cell Volume : 91.0 fL  Mean Cell Hemoglobin : 28.3 pg  Mean Cell Hemoglobin Concentration : 31.1 %  Auto Neutrophil # : x  Auto Lymphocyte # : x  Auto Monocyte # : x  Auto Eosinophil # : x  Auto Basophil # : x  Auto Neutrophil % : x  Auto Lymphocyte % : x  Auto Monocyte % : x  Auto Eosinophil % : x  Auto Basophil % : x    05-15    137  |  101  |  64<H>  ----------------------------<  266<H>  3.8   |  23  |  1.93<H>    Ca    9.5      15 May 2019 05:40  Mg     2.2     05-15

## 2019-05-15 NOTE — PROGRESS NOTE ADULT - SUBJECTIVE AND OBJECTIVE BOX
Patient seen and examined. She was sitting in chair today. States she feels good. No acute SOB, CP, palpitations.   No tele events overnight, NSR 70-80, rare PVCs.   Took a few steps with PT this morning per family.     Medications:  acetaminophen   Tablet .. 650 milliGRAM(s) Oral every 6 hours PRN  ALPRAZolam 0.25 milliGRAM(s) Oral every 12 hours PRN  aspirin enteric coated 81 milliGRAM(s) Oral daily  atorvastatin 40 milliGRAM(s) Oral at bedtime  buDESOnide 160 MICROgram(s)/formoterol 4.5 MICROgram(s) Inhaler 2 Puff(s) Inhalation two times a day  buMETAnide IVPB 4 milliGRAM(s) IV Intermittent two times a day  chlorhexidine 4% Liquid 1 Application(s) Topical <User Schedule>  dextrose 40% Gel 15 Gram(s) Oral once PRN  dextrose 5%. 1000 milliLiter(s) IV Continuous <Continuous>  dextrose 50% Injectable 12.5 Gram(s) IV Push once  dextrose 50% Injectable 25 Gram(s) IV Push once  dextrose 50% Injectable 25 Gram(s) IV Push once  docusate sodium 100 milliGRAM(s) Oral two times a day  ergocalciferol 94257 Unit(s) Oral <User Schedule>  gabapentin 100 milliGRAM(s) Oral two times a day  glucagon  Injectable 1 milliGRAM(s) IntraMuscular once PRN  hydrALAZINE 10 milliGRAM(s) Oral three times a day  influenza   Vaccine 0.5 milliLiter(s) IntraMuscular once  insulin glargine Injectable (LANTUS) 55 Unit(s) SubCutaneous at bedtime  insulin lispro (HumaLOG) corrective regimen sliding scale   SubCutaneous three times a day before meals  insulin lispro (HumaLOG) corrective regimen sliding scale   SubCutaneous at bedtime  insulin lispro Injectable (HumaLOG) 10 Unit(s) SubCutaneous three times a day with meals  isosorbide   dinitrate Tablet (ISORDIL) 10 milliGRAM(s) Oral three times a day  levothyroxine 50 MICROGram(s) Oral daily  metoprolol succinate ER 12.5 milliGRAM(s) Oral daily  montelukast 10 milliGRAM(s) Oral daily  pantoprazole    Tablet 40 milliGRAM(s) Oral before breakfast  polyethylene glycol 3350 17 Gram(s) Oral two times a day  potassium chloride    Tablet ER 20 milliEquivalent(s) Oral once  senna 2 Tablet(s) Oral at bedtime  sodium chloride 0.65% Nasal 1 Spray(s) Both Nostrils three times a day  spironolactone 25 milliGRAM(s) Oral daily  ticagrelor 90 milliGRAM(s) Oral two times a day      Vitals:  Vital Signs Last 24 Hrs  T(C): 36.7 (15 May 2019 09:13), Max: 36.7 (14 May 2019 20:51)  T(F): 98 (15 May 2019 09:13), Max: 98 (14 May 2019 20:51)  HR: 76 (15 May 2019 09:13) (74 - 86)  BP: 107/56 (15 May 2019 09:13) (103/57 - 119/63)  BP(mean): --  RR: 18 (15 May 2019 09:13) (18 - 18)  SpO2: 100% (15 May 2019 09:13) (100% - 100%)    Daily     Daily Weight in k.7 (15 May 2019 05:51)    I&O's Detail    14 May 2019 07:01  -  15 May 2019 07:00  --------------------------------------------------------  IN:    Oral Fluid: 200 mL  Total IN: 200 mL    OUT:    Intermittent Catheterization - Urethral: 1320 mL  Total OUT: 1320 mL    Total NET: -1120 mL      15 May 2019 07:01  -  15 May 2019 11:55  --------------------------------------------------------  IN:  Total IN: 0 mL    OUT:    Intermittent Catheterization - Urethral: 430 mL  Total OUT: 430 mL    Total NET: -430 mL          Physical Exam:     General: No distress. Comfortable.  HEENT: EOM intact.  Neck: Neck supple. JVP mildly elevated. No masses  Chest: Clear to auscultation bilaterally  CV: Normal S1 and S2. No murmurs, rub, or gallops. Radial pulses normal. No LE edema b/l. Warm b/l.   Abdomen: Soft, non-distended, non-tender  Skin: No rashes or skin breakdown  Neurology: Alert and oriented times three. Sensation intact  Psych: Affect normal    Labs:                        8.8    8.87  )-----------( 335      ( 15 May 2019 05:40 )             28.3     05-15    137  |  101  |  64<H>  ----------------------------<  266<H>  3.8   |  23  |  1.93<H>    Ca    9.5      15 May 2019 05:40  Mg     2.2     05-15

## 2019-05-15 NOTE — PROGRESS NOTE ADULT - SUBJECTIVE AND OBJECTIVE BOX
Patient is a 73y old  Female who presents with a chief complaint of sob (15 May 2019 12:00)      Any change in ROS:   pt is doing same: per DIL at bedside she seems to be doing a littel better:      MEDICATIONS  (STANDING):  aspirin enteric coated 81 milliGRAM(s) Oral daily  atorvastatin 40 milliGRAM(s) Oral at bedtime  buDESOnide 160 MICROgram(s)/formoterol 4.5 MICROgram(s) Inhaler 2 Puff(s) Inhalation two times a day  buMETAnide IVPB 4 milliGRAM(s) IV Intermittent two times a day  chlorhexidine 4% Liquid 1 Application(s) Topical <User Schedule>  dextrose 5%. 1000 milliLiter(s) (50 mL/Hr) IV Continuous <Continuous>  dextrose 50% Injectable 12.5 Gram(s) IV Push once  dextrose 50% Injectable 25 Gram(s) IV Push once  dextrose 50% Injectable 25 Gram(s) IV Push once  docusate sodium 100 milliGRAM(s) Oral two times a day  ergocalciferol 26512 Unit(s) Oral <User Schedule>  gabapentin 100 milliGRAM(s) Oral two times a day  hydrALAZINE 10 milliGRAM(s) Oral three times a day  influenza   Vaccine 0.5 milliLiter(s) IntraMuscular once  insulin glargine Injectable (LANTUS) 55 Unit(s) SubCutaneous at bedtime  insulin lispro (HumaLOG) corrective regimen sliding scale   SubCutaneous three times a day before meals  insulin lispro (HumaLOG) corrective regimen sliding scale   SubCutaneous at bedtime  insulin lispro Injectable (HumaLOG) 10 Unit(s) SubCutaneous three times a day with meals  isosorbide   dinitrate Tablet (ISORDIL) 10 milliGRAM(s) Oral three times a day  levothyroxine 50 MICROGram(s) Oral daily  metoprolol succinate ER 12.5 milliGRAM(s) Oral daily  montelukast 10 milliGRAM(s) Oral daily  pantoprazole    Tablet 40 milliGRAM(s) Oral before breakfast  polyethylene glycol 3350 17 Gram(s) Oral two times a day  senna 2 Tablet(s) Oral at bedtime  sodium chloride 0.65% Nasal 1 Spray(s) Both Nostrils three times a day  spironolactone 25 milliGRAM(s) Oral daily  ticagrelor 90 milliGRAM(s) Oral two times a day    MEDICATIONS  (PRN):  acetaminophen   Tablet .. 650 milliGRAM(s) Oral every 6 hours PRN Mild Pain (1 - 3), Moderate Pain (4 - 6), Severe Pain (7 - 10)  ALPRAZolam 0.25 milliGRAM(s) Oral every 12 hours PRN panic attack  dextrose 40% Gel 15 Gram(s) Oral once PRN Blood Glucose LESS THAN 70 milliGRAM(s)/deciliter  glucagon  Injectable 1 milliGRAM(s) IntraMuscular once PRN Glucose LESS THAN 70 milligrams/deciliter    Vital Signs Last 24 Hrs  T(C): 36.7 (15 May 2019 09:13), Max: 36.7 (14 May 2019 20:51)  T(F): 98 (15 May 2019 09:13), Max: 98 (14 May 2019 20:51)  HR: 86 (15 May 2019 12:28) (74 - 86)  BP: 99/53 (15 May 2019 12:28) (99/53 - 119/63)  BP(mean): --  RR: 18 (15 May 2019 12:28) (18 - 18)  SpO2: 100% (15 May 2019 12:28) (100% - 100%)    I&O's Summary    14 May 2019 07:01  -  15 May 2019 07:00  --------------------------------------------------------  IN: 200 mL / OUT: 1320 mL / NET: -1120 mL    15 May 2019 07:01  -  15 May 2019 13:04  --------------------------------------------------------  IN: 0 mL / OUT: 430 mL / NET: -430 mL          Physical Exam:   GENERAL: NAD, well-groomed, well-developed  HEENT: MOHSEN/   Atraumatic, Normocephalic  ENMT: No tonsillar erythema, exudates, or enlargement; Moist mucous membranes, Good dentition, No lesions  NECK: Supple, No JVD, Normal thyroid  CHEST/LUNG: persistent scattered crackles bilaterally:   CVS: Regular rate and rhythm; No murmurs, rubs, or gallops  GI: : Soft, Nontender, Nondistended; Bowel sounds present  NERVOUS SYSTEM:  Alert & Oriented X3  EXTREMITIES:  - edema  LYMPH: No lymphadenopathy noted  SKIN: No rashes or lesions  ENDOCRINOLOGY: No Thyromegaly  PSYCH: Appropriate    Labs:  23                            8.8    8.87  )-----------( 335      ( 15 May 2019 05:40 )             28.3                         9.1    9.74  )-----------( 337      ( 14 May 2019 05:50 )             29.4                         8.1    10.18 )-----------( 304      ( 13 May 2019 06:10 )             26.1                         8.0    11.66 )-----------( 269      ( 12 May 2019 03:00 )             25.2     05-15    137  |  101  |  64<H>  ----------------------------<  266<H>  3.8   |  23  |  1.93<H>  05-14    138  |  101  |  70<H>  ----------------------------<  257<H>  4.3   |  18<L>  |  1.92<H>  05-14    139  |  100  |  70<H>  ----------------------------<  171<H>  3.8   |  22  |  2.04<H>  05-13    135  |  97<L>  |  74<H>  ----------------------------<  323<H>  4.3   |  23  |  2.11<H>  05-13    137  |  98  |  74<H>  ----------------------------<  330<H>  4.2   |  21<L>  |  2.11<H>  05-13    140  |  103  |  72<H>  ----------------------------<  160<H>  3.6   |  21<L>  |  2.09<H>  05-12    134<L>  |  99  |  79<H>  ----------------------------<  201<H>  3.5   |  19<L>  |  2.37<H>    Ca    9.5      15 May 2019 05:40  Ca    9.7      14 May 2019 18:45  Ca    9.4      14 May 2019 05:50  Ca    9.1      13 May 2019 20:42  Ca    9.1      13 May 2019 19:14  Mg     2.2     05-15  Mg     2.3     05-14  Mg     2.3     05-14  Mg     2.2     05-13    TPro  7.4  /  Alb  3.7  /  TBili  0.4  /  DBili  x   /  AST  19  /  ALT  23  /  AlkPhos  69  05-13    CAPILLARY BLOOD GLUCOSE      POCT Blood Glucose.: 228 mg/dL (15 May 2019 12:33)  POCT Blood Glucose.: 281 mg/dL (15 May 2019 08:39)  POCT Blood Glucose.: 333 mg/dL (14 May 2019 21:20)  POCT Blood Glucose.: 235 mg/dL (14 May 2019 17:27)            D-Dimer Assay, Quantitative: 243 ng/mL (05-08 @ 23:58)  Procalcitonin, Serum: 0.30 ng/mL (05-13 @ 19:14)  Procalcitonin, Serum: 0.34 ng/mL (05-13 @ 06:10)  Procalcitonin, Serum: 0.38 ng/mL (05-12 @ 18:25)        RECENT CULTURES:  05-13 @ 07:40 BLOOD     < from: CT Chest No Cont (05.13.19 @ 10:34) >    MEDIASTINUM AND ADIA: No lymphadenopathy.    VESSELS: Mild atherosclerotic calcifications at the aortic arch and   origins of great vessels.    HEART: Cardiomegaly. No pericardial effusion. Coronary artery   calcifications. CABG.    CHEST WALL AND LOWER NECK: Status post median sternotomy. Paraspinal   musculature fatty atrophy.    VISUALIZED UPPER ABDOMEN: Within normal limits.    BONES: Within normal limits.    IMPRESSION:     Patchy peribronchovascular-central groundglass opacities predominantly   within the mid to lower lungs may represent interstitial edema and are   decreased from prior exams.                SANDHYA BERUMEN M.D., RADIOLOGY RESIDENT  This document has been electronically signed.  DIMITRIOS FRAZIER M.D., ATTENDING RADIOLOGIST  This document has been electronically signed. May 13 2019 11:21AM        < end of copied text >               NO ORGANISMS ISOLATED  NO ORGANISMS ISOLATED AT 48 HRS.  05-12 @ 18:37 BLOOD PERIPHERAL                  NO ORGANISMS ISOLATED  NO ORGANISMS ISOLATED AT 48 HRS.        RESPIRATORY CULTURES:          Studies  Chest X-RAY  CT SCAN Chest   Venous Dopplers: LE:   CT Abdomen  Others

## 2019-05-15 NOTE — PROGRESS NOTE ADULT - ASSESSMENT
73YOF with hx of CAD s/p CABG, hypothyroidism, CKD, CHF, HTN, DM p/w worsening ZEE and sob.    A/p  MERVIN  Etiology?  Likely sec to CHF  s/p cardiac cath 05/09  Renal function improving  changing to bumex Iv today. f/u cardio  monitor bmp  avoid nephrotoxic agents      CKD stage 3  baseline cr 1.5-1.8  likely sec to HTN/DM/chf  elevated PTH, vit d insufficieny, continue vit d 98872 QW x 4 dose   phos optimal    CHF  monitor daily weight and i/o  diuretics per cardio  f/u cardio

## 2019-05-15 NOTE — PROGRESS NOTE ADULT - ASSESSMENT
72 YO F w/o h/o CAD, s/p CABG Systolic CHF, CKD 3b, who p/w sob and pino.      - Acute on Chronic Systolic CHF Exacerbation, and Severe Mitral Valve Regurgitation:      - persistent abnormal s/s      - c/w diuresis and cards meds      - All consultants management greatly appreciated!        - severe systolic dysfunction with severe MVR - pt/family agreeable to mitral valve clip - outpt referal      - c/w cardiac meds      - PT, strict i/o, tele, oob to chair     - Major Depressive Disorder vs Mood Disorder vs Alternative:       - psychosocial support       - Obtain Psych Consult and f/u recs/management     - Symptomatic Supraventricular Tachycardia:      - improving       - c/w tele      - c/w bb       - all consultants & medical team members management greatly appreciated      - Leukocytosis:      - improved       - no signs of active infectious state     - Panic Attack:       - xanax prn        - c/w optimization of co-morbidities    - Symptomatic Anemia 2/2 gastrointestinal bleed:      - relatively stable h/h      - slow bleed, conservative management for now      - monitor h/h, maintain hgb > 7     - Acute Gout Flare:       - improved    - NSTEMI:      - c/w rx      - adjust management prn      - tele    - MERVIN on CKD      - stable renal function.  trend renal function.      - optimize comorbidities. Avoid nephrotoxic rx     - DM II with long term complications of hyperglycemia, hypoglcyemia, and nephropathy   - c/w dm rx, as above    - monitor FS closely given hypoglycemia       - complicated by poor nutritional compliance (pt fed food from outside the hospital)       - c/w dm rx      - dm education

## 2019-05-15 NOTE — PROGRESS NOTE ADULT - ASSESSMENT
73YOF w/o h/o CAD, s/p CABG CHF adm to [facility] for Acute on Chronic systolic and diastolic HF

## 2019-05-15 NOTE — PROGRESS NOTE ADULT - PROBLEM SELECTOR PLAN 3
-Continue ASA and Brilinta and Lipitor  [facility] 5/9/19- multiple vessel CAD, cont medical therapy.

## 2019-05-15 NOTE — PROGRESS NOTE ADULT - SUBJECTIVE AND OBJECTIVE BOX
Chief complaint  Patient is a 73y old  Female who presents with a chief complaint of sob (15 May 2019 13:03)   Review of systems  Patient in bed, looks comfortable, no fever, no hypoglycemia.    Labs and Fingersticks  CAPILLARY BLOOD GLUCOSE      POCT Blood Glucose.: 228 mg/dL (15 May 2019 12:33)  POCT Blood Glucose.: 281 mg/dL (15 May 2019 08:39)  POCT Blood Glucose.: 333 mg/dL (14 May 2019 21:20)  POCT Blood Glucose.: 235 mg/dL (14 May 2019 17:27)      Anion Gap, Serum: 13 (05-15 @ 05:40)  Anion Gap, Serum: 19 <H> (05-14 @ 18:45)  Anion Gap, Serum: 17 <H> (05-14 @ 05:50)  Anion Gap, Serum: 15 <H> (05-13 @ 20:42)  Anion Gap, Serum: 18 <H> (05-13 @ 19:14)      Calcium, Total Serum: 9.5 (05-15 @ 05:40)  Calcium, Total Serum: 9.7 (05-14 @ 18:45)  Calcium, Total Serum: 9.4 (05-14 @ 05:50)  Calcium, Total Serum: 9.1 (05-13 @ 20:42)  Calcium, Total Serum: 9.1 (05-13 @ 19:14)          05-15    137  |  101  |  64<H>  ----------------------------<  266<H>  3.8   |  23  |  1.93<H>    Ca    9.5      15 May 2019 05:40  Mg     2.2     05-15                          8.8    8.87  )-----------( 335      ( 15 May 2019 05:40 )             28.3     Medications  MEDICATIONS  (STANDING):  aspirin enteric coated 81 milliGRAM(s) Oral daily  atorvastatin 40 milliGRAM(s) Oral at bedtime  buDESOnide 160 MICROgram(s)/formoterol 4.5 MICROgram(s) Inhaler 2 Puff(s) Inhalation two times a day  buMETAnide 4 milliGRAM(s) Oral two times a day  chlorhexidine 4% Liquid 1 Application(s) Topical <User Schedule>  dextrose 5%. 1000 milliLiter(s) (50 mL/Hr) IV Continuous <Continuous>  dextrose 50% Injectable 12.5 Gram(s) IV Push once  dextrose 50% Injectable 25 Gram(s) IV Push once  dextrose 50% Injectable 25 Gram(s) IV Push once  docusate sodium 100 milliGRAM(s) Oral two times a day  ergocalciferol 64455 Unit(s) Oral <User Schedule>  gabapentin 100 milliGRAM(s) Oral two times a day  hydrALAZINE 20 milliGRAM(s) Oral three times a day  influenza   Vaccine 0.5 milliLiter(s) IntraMuscular once  insulin glargine Injectable (LANTUS) 55 Unit(s) SubCutaneous at bedtime  insulin lispro (HumaLOG) corrective regimen sliding scale   SubCutaneous three times a day before meals  insulin lispro (HumaLOG) corrective regimen sliding scale   SubCutaneous at bedtime  insulin lispro Injectable (HumaLOG) 10 Unit(s) SubCutaneous three times a day with meals  isosorbide   dinitrate Tablet (ISORDIL) 10 milliGRAM(s) Oral three times a day  levothyroxine 50 MICROGram(s) Oral daily  metolazone 2.5 milliGRAM(s) Oral <User Schedule>  metoprolol succinate ER 12.5 milliGRAM(s) Oral daily  montelukast 10 milliGRAM(s) Oral daily  pantoprazole    Tablet 40 milliGRAM(s) Oral before breakfast  polyethylene glycol 3350 17 Gram(s) Oral two times a day  senna 2 Tablet(s) Oral at bedtime  sodium chloride 0.65% Nasal 1 Spray(s) Both Nostrils three times a day  spironolactone 25 milliGRAM(s) Oral daily  ticagrelor 90 milliGRAM(s) Oral two times a day      Physical Exam  General: Patient comfortable in bed  Vital Signs Last 12 Hrs  T(F): 98 (05-15-19 @ 12:28), Max: 98 (05-15-19 @ 09:13)  HR: 83 (05-15-19 @ 14:28) (76 - 86)  BP: 107/51 (05-15-19 @ 14:28) (99/53 - 107/56)  BP(mean): --  RR: 18 (05-15-19 @ 14:28) (18 - 18)  SpO2: 100% (05-15-19 @ 14:28) (100% - 100%)  Neck: No palpable thyroid nodules.  CVS: S1S2, No murmurs  Respiratory: No wheezing, no crepitations  GI: Abdomen soft, bowel sounds positive  Musculoskeletal:  edema lower extremities.   Skin: No skin rashes, no ecchymosis    Diagnostics    Thyroid Stimulating Hormone, Serum: AM Sched. Collection: 07-May-2019 06:00 (05-06 @ 10:38)  Free Thyroxine, Serum: AM Sched. Collection: 07-May-2019 06:00 (05-06 @ 10:38)  Cortisol, Serum (ESO): AM Sched. Collection: 15-May-2019 06:00 (05-14 @ 13:01)

## 2019-05-15 NOTE — PROGRESS NOTE ADULT - PROBLEM SELECTOR PLAN 2
Pt to get mitral clip evaluation as impatient   Plan is to get pt home or rehab with outpatient follow up.

## 2019-05-15 NOTE — PROGRESS NOTE ADULT - PROBLEM SELECTOR PLAN 1
She is on diuretics: awaiting TRANSFER TO [facility]: RECED EXTRAS DOSES OF BUMEX TODAY  5/10: pt is in [facility]: cath noted: being aggressively diuresed: Most of her breathing is secondary to chf: May use bipap for resp support if required:  5/13: events noted: in chf: has severe MR: on milrinone as wellas diuretics: she still seems SOB: Now family wants evaluation for surgery : She has not been wheezing: Would c ont BD!  5/14: breathing wise reasonable: needs to walk: no wheezing! cont cymbisort  5/15: no wheezing: cont symbicort!

## 2019-05-15 NOTE — PROGRESS NOTE ADULT - PROBLEM SELECTOR PLAN 1
Tolerated decrease in milrinone yesterday. Tolerated all other HF meds.   Walked a few steps with PT this morning.   Diuresed -1.1 L in 24 hrs.   BUN/Cr improving.   Discontinued Milrinone gtt.   Discontinue Bumex gtt.   Started Bumex 4 mg IVPB BID.   -continue hydral 10 mg po TID.   -Continue Isordil 10 mg po TID.  -Continue Toprol 12.5 mg po qd. Will likely increase to 25 mg today.   hypokalemic 3.8, supplemented with KCL 20 meq x 1. Please check BMP q 12 hrs and supplement to keep K 4.0-4.5 and mag 2.0.

## 2019-05-15 NOTE — PROGRESS NOTE ADULT - ASSESSMENT
Assessment  DMT2: 73y Female with DM T2 with hyperglycemia, on insulin, blood sugars fluctuating, eating full meals, she is still feeling weak.  CAD: on medications, stable, monitored.  Hypothyroidism: On Synthroid 50 mcg po daily, compliant with Synthroid intake, asymptomatic.  HTN: Controlled,  on antihypertensive medications.  SOB: On Tx, oxygen.  Obesity: No strict exercise routines, not on any weight loss program, neither on low calorie diet.          Jillian Banks MD  Cell: [phone]  Office: [phone]

## 2019-05-15 NOTE — PROGRESS NOTE ADULT - SUBJECTIVE AND OBJECTIVE BOX
Patient seen and examined at bedside  c/o zee and sob, sadness   Case discussed with medical team    HPI:  73YOF with hx of CAD s/p CABG, hypothyroidism, CKD, CHF, HTN, DM p/w worsening ZEE and sob. Patient usually can walk up a few steps before feeling sob, currently feeling sob after walking from living room to kitchen.  She is using 3 pillows to sleep. No cough, fevers. (+) CP, SS and constant, She experienced more ZEE for last two days.  She has no edema in lower extremety.  Last admission to [facility] was 11/18. (24 Apr 2019 10:21)      PAST MEDICAL & SURGICAL HISTORY:  GERD (gastroesophageal reflux disease)  CAD (coronary artery disease): s/p CABG  Hypothyroidism  Hyperlipidemia  Diabetes mellitus, type 2  S/P CABG (coronary artery bypass graft)      azithromycin (Hives; Pruritus)       MEDICATIONS  (STANDING):  aspirin enteric coated 81 milliGRAM(s) Oral daily  atorvastatin 40 milliGRAM(s) Oral at bedtime  buDESOnide 160 MICROgram(s)/formoterol 4.5 MICROgram(s) Inhaler 2 Puff(s) Inhalation two times a day  buMETAnide Infusion 0.5 mG/Hr (2.5 mL/Hr) IV Continuous <Continuous>  chlorhexidine 4% Liquid 1 Application(s) Topical <User Schedule>  dextrose 5%. 1000 milliLiter(s) (50 mL/Hr) IV Continuous <Continuous>  dextrose 50% Injectable 12.5 Gram(s) IV Push once  dextrose 50% Injectable 25 Gram(s) IV Push once  dextrose 50% Injectable 25 Gram(s) IV Push once  docusate sodium 100 milliGRAM(s) Oral two times a day  ergocalciferol 39452 Unit(s) Oral <User Schedule>  gabapentin 100 milliGRAM(s) Oral two times a day  hydrALAZINE 10 milliGRAM(s) Oral three times a day  influenza   Vaccine 0.5 milliLiter(s) IntraMuscular once  insulin glargine Injectable (LANTUS) 55 Unit(s) SubCutaneous at bedtime  insulin lispro (HumaLOG) corrective regimen sliding scale   SubCutaneous three times a day before meals  insulin lispro (HumaLOG) corrective regimen sliding scale   SubCutaneous at bedtime  insulin lispro Injectable (HumaLOG) 10 Unit(s) SubCutaneous three times a day with meals  isosorbide   dinitrate Tablet (ISORDIL) 10 milliGRAM(s) Oral three times a day  levothyroxine 50 MICROGram(s) Oral daily  metoprolol succinate ER 12.5 milliGRAM(s) Oral daily  milrinone Infusion 0.125 MICROgram(s)/kG/Min (2.138 mL/Hr) IV Continuous <Continuous>  montelukast 10 milliGRAM(s) Oral daily  pantoprazole    Tablet 40 milliGRAM(s) Oral before breakfast  polyethylene glycol 3350 17 Gram(s) Oral two times a day  senna 2 Tablet(s) Oral at bedtime  sodium chloride 0.65% Nasal 1 Spray(s) Both Nostrils three times a day  spironolactone 25 milliGRAM(s) Oral daily  ticagrelor 90 milliGRAM(s) Oral two times a day    MEDICATIONS  (PRN):  acetaminophen   Tablet .. 650 milliGRAM(s) Oral every 6 hours PRN Mild Pain (1 - 3), Moderate Pain (4 - 6), Severe Pain (7 - 10)  ALPRAZolam 0.25 milliGRAM(s) Oral every 12 hours PRN panic attack  dextrose 40% Gel 15 Gram(s) Oral once PRN Blood Glucose LESS THAN 70 milliGRAM(s)/deciliter  glucagon  Injectable 1 milliGRAM(s) IntraMuscular once PRN Glucose LESS THAN 70 milligrams/deciliter      REVIEW OF SYSTEMS: as above and below  CONSTITUTIONAL: (+) malaise.   EYES: No acute change in vision   ENT:  No tinnitus  NECK: No stiffness  RESPIRATORY: zee. sob. No hemoptysis  CARDIOVASCULAR: decreased exercise tolerance. No chest pain, palpitations, syncope  GASTROINTESTINAL: No hematemesis, diarrhea, melena, or hematochezia.  GENITOURINARY: No hematuria  NEUROLOGICAL: No headaches  LYMPH Nodes: No enlarged glands  ENDOCRINE: No heat or cold intolerance	    T(C): 36.6 (05-15-19 @ 05:51), Max: 36.7 (05-14-19 @ 20:51)  HR: 76 (05-15-19 @ 05:51) (74 - 86)  BP: 103/57 (05-15-19 @ 05:51) (103/57 - 119/63)  RR: 18 (05-15-19 @ 05:51) (18 - 18)  SpO2: 100% (05-15-19 @ 05:51) (100% - 100%)    PHYSICAL EXAMINATION:   Constitutional: stable ill appearance. NAD  HEENT: NC, AT  Neck:  Supple  Respiratory: mild bibasilar rales. Adequate airflow b/l. Not using accessory muscles of respiration.  Cardiovascular:  stable systolic murmur, S1 & S2 intact, no R/G, 2+ radial pulses b/l  Gastrointestinal: Soft, NT, ND, normoactive b.s., no organomegaly/RT/rigidity  Extremities: WWP  Neurological:  Alert and awake.  No acute focal motor deficits. Crude sensation intact.     Labs and imaging reviewed    LABS:                        8.8    8.87  )-----------( 335      ( 15 May 2019 05:40 )             28.3     05-14    138  |  101  |  70<H>  ----------------------------<  257<H>  4.3   |  18<L>  |  1.92<H>    Ca    9.7      14 May 2019 18:45  Mg     2.3     05-14              CAPILLARY BLOOD GLUCOSE      POCT Blood Glucose.: 333 mg/dL (14 May 2019 21:20)  POCT Blood Glucose.: 235 mg/dL (14 May 2019 17:27)  POCT Blood Glucose.: 238 mg/dL (14 May 2019 12:31)  POCT Blood Glucose.: 147 mg/dL (14 May 2019 08:26)              RADIOLOGY & ADDITIONAL STUDIES:

## 2019-05-15 NOTE — PROGRESS NOTE ADULT - ATTENDING COMMENTS
Milrinone and Bumex gtts stopped.  Start Bumex 4 mg po bid and metolazone 2.5 mg po Th/Mon (1st dose tomorrow).  Increase hydralazine to 20 mg po tid, if possible.  She can f/u with me in a week.  D/c planning. Milrinone and Bumex gtts stopped.  Start Bumex 4 mg po bid and metolazone 2.5 mg po Th/Mon (1st dose tomorrow).  Increase hydralazine to 20 mg po tid, if possible.  She can f/u with me in a week.  D/c planning.    Follow up with heart failure clinic on 5/22 @ 4 PM, Lorena Villar NP/ Dr. Bonilla Hay.

## 2019-05-16 ENCOUNTER — TRANSCRIPTION ENCOUNTER (OUTPATIENT)
Age: 74
End: 2019-05-16

## 2019-05-16 VITALS — HEART RATE: 81 BPM | SYSTOLIC BLOOD PRESSURE: 94 MMHG | DIASTOLIC BLOOD PRESSURE: 54 MMHG

## 2019-05-16 LAB
ANION GAP SERPL CALC-SCNC: 18 MMO/L — HIGH (ref 7–14)
BASOPHILS # BLD AUTO: 0.04 K/UL — SIGNIFICANT CHANGE UP (ref 0–0.2)
BASOPHILS NFR BLD AUTO: 0.4 % — SIGNIFICANT CHANGE UP (ref 0–2)
BUN SERPL-MCNC: 67 MG/DL — HIGH (ref 7–23)
CALCIUM SERPL-MCNC: 10 MG/DL — SIGNIFICANT CHANGE UP (ref 8.4–10.5)
CHLORIDE SERPL-SCNC: 99 MMOL/L — SIGNIFICANT CHANGE UP (ref 98–107)
CO2 SERPL-SCNC: 19 MMOL/L — LOW (ref 22–31)
CREAT SERPL-MCNC: 1.96 MG/DL — HIGH (ref 0.5–1.3)
EOSINOPHIL # BLD AUTO: 0.46 K/UL — SIGNIFICANT CHANGE UP (ref 0–0.5)
EOSINOPHIL NFR BLD AUTO: 4.4 % — SIGNIFICANT CHANGE UP (ref 0–6)
GLUCOSE SERPL-MCNC: 243 MG/DL — HIGH (ref 70–99)
HCT VFR BLD CALC: 27.8 % — LOW (ref 34.5–45)
HGB BLD-MCNC: 8.8 G/DL — LOW (ref 11.5–15.5)
IMM GRANULOCYTES NFR BLD AUTO: 1.6 % — HIGH (ref 0–1.5)
LYMPHOCYTES # BLD AUTO: 1.46 K/UL — SIGNIFICANT CHANGE UP (ref 1–3.3)
LYMPHOCYTES # BLD AUTO: 14 % — SIGNIFICANT CHANGE UP (ref 13–44)
MAGNESIUM SERPL-MCNC: 2 MG/DL — SIGNIFICANT CHANGE UP (ref 1.6–2.6)
MCHC RBC-ENTMCNC: 28.1 PG — SIGNIFICANT CHANGE UP (ref 27–34)
MCHC RBC-ENTMCNC: 31.7 % — LOW (ref 32–36)
MCV RBC AUTO: 88.8 FL — SIGNIFICANT CHANGE UP (ref 80–100)
MONOCYTES # BLD AUTO: 0.74 K/UL — SIGNIFICANT CHANGE UP (ref 0–0.9)
MONOCYTES NFR BLD AUTO: 7.1 % — SIGNIFICANT CHANGE UP (ref 2–14)
NEUTROPHILS # BLD AUTO: 7.58 K/UL — HIGH (ref 1.8–7.4)
NEUTROPHILS NFR BLD AUTO: 72.5 % — SIGNIFICANT CHANGE UP (ref 43–77)
NRBC # FLD: 0 K/UL — SIGNIFICANT CHANGE UP (ref 0–0)
PLATELET # BLD AUTO: 321 K/UL — SIGNIFICANT CHANGE UP (ref 150–400)
PMV BLD: 9 FL — SIGNIFICANT CHANGE UP (ref 7–13)
POTASSIUM SERPL-MCNC: 3.7 MMOL/L — SIGNIFICANT CHANGE UP (ref 3.5–5.3)
POTASSIUM SERPL-SCNC: 3.7 MMOL/L — SIGNIFICANT CHANGE UP (ref 3.5–5.3)
RBC # BLD: 3.13 M/UL — LOW (ref 3.8–5.2)
RBC # FLD: 15.8 % — HIGH (ref 10.3–14.5)
SODIUM SERPL-SCNC: 136 MMOL/L — SIGNIFICANT CHANGE UP (ref 135–145)
TROPONIN T, HIGH SENSITIVITY: 137 NG/L — CRITICAL HIGH (ref ?–14)
URATE SERPL-MCNC: 14.8 MG/DL — HIGH (ref 2.5–7)
WBC # BLD: 10.45 K/UL — SIGNIFICANT CHANGE UP (ref 3.8–10.5)
WBC # FLD AUTO: 10.45 K/UL — SIGNIFICANT CHANGE UP (ref 3.8–10.5)

## 2019-05-16 PROCEDURE — 99233 SBSQ HOSP IP/OBS HIGH 50: CPT

## 2019-05-16 RX ORDER — HYDRALAZINE HCL 50 MG
2 TABLET ORAL
Qty: 0 | Refills: 0 | DISCHARGE
Start: 2019-05-16

## 2019-05-16 RX ORDER — INSULIN GLARGINE 100 [IU]/ML
55 INJECTION, SOLUTION SUBCUTANEOUS
Qty: 0 | Refills: 0 | DISCHARGE
Start: 2019-05-16

## 2019-05-16 RX ORDER — DOCUSATE SODIUM 100 MG
1 CAPSULE ORAL
Qty: 0 | Refills: 0 | DISCHARGE
Start: 2019-05-16

## 2019-05-16 RX ORDER — BUMETANIDE 0.25 MG/ML
2 INJECTION INTRAMUSCULAR; INTRAVENOUS
Qty: 0 | Refills: 0 | DISCHARGE
Start: 2019-05-16

## 2019-05-16 RX ORDER — INSULIN DEGLUDEC 100 U/ML
65 INJECTION, SOLUTION SUBCUTANEOUS
Qty: 0 | Refills: 0 | DISCHARGE

## 2019-05-16 RX ORDER — ALPRAZOLAM 0.25 MG
1 TABLET ORAL
Qty: 0 | Refills: 0 | DISCHARGE
Start: 2019-05-16

## 2019-05-16 RX ORDER — ATORVASTATIN CALCIUM 80 MG/1
1 TABLET, FILM COATED ORAL
Qty: 0 | Refills: 0 | DISCHARGE

## 2019-05-16 RX ORDER — POTASSIUM CHLORIDE 20 MEQ
40 PACKET (EA) ORAL ONCE
Refills: 0 | Status: COMPLETED | OUTPATIENT
Start: 2019-05-16 | End: 2019-05-16

## 2019-05-16 RX ORDER — SENNA PLUS 8.6 MG/1
2 TABLET ORAL
Qty: 0 | Refills: 0 | DISCHARGE
Start: 2019-05-16

## 2019-05-16 RX ORDER — GABAPENTIN 400 MG/1
1 CAPSULE ORAL
Qty: 0 | Refills: 0 | DISCHARGE
Start: 2019-05-16

## 2019-05-16 RX ORDER — PANTOPRAZOLE SODIUM 20 MG/1
1 TABLET, DELAYED RELEASE ORAL
Qty: 0 | Refills: 0 | DISCHARGE
Start: 2019-05-16

## 2019-05-16 RX ORDER — POLYETHYLENE GLYCOL 3350 17 G/17G
17 POWDER, FOR SOLUTION ORAL
Qty: 0 | Refills: 0 | DISCHARGE
Start: 2019-05-16

## 2019-05-16 RX ORDER — RANITIDINE HYDROCHLORIDE 150 MG/1
1 TABLET, FILM COATED ORAL
Qty: 0 | Refills: 0 | DISCHARGE

## 2019-05-16 RX ORDER — SPIRONOLACTONE 25 MG/1
1 TABLET, FILM COATED ORAL
Qty: 0 | Refills: 0 | DISCHARGE
Start: 2019-05-16

## 2019-05-16 RX ORDER — ISOSORBIDE DINITRATE 5 MG/1
1 TABLET ORAL
Qty: 0 | Refills: 0 | DISCHARGE
Start: 2019-05-16

## 2019-05-16 RX ORDER — ATORVASTATIN CALCIUM 80 MG/1
1 TABLET, FILM COATED ORAL
Qty: 0 | Refills: 0 | DISCHARGE
Start: 2019-05-16

## 2019-05-16 RX ORDER — BUDESONIDE AND FORMOTEROL FUMARATE DIHYDRATE 160; 4.5 UG/1; UG/1
0 AEROSOL RESPIRATORY (INHALATION)
Qty: 0 | Refills: 0 | DISCHARGE
Start: 2019-05-16

## 2019-05-16 RX ORDER — ACETAMINOPHEN 500 MG
2 TABLET ORAL
Qty: 0 | Refills: 0 | DISCHARGE
Start: 2019-05-16

## 2019-05-16 RX ADMIN — BUMETANIDE 4 MILLIGRAM(S): 0.25 INJECTION INTRAMUSCULAR; INTRAVENOUS at 17:53

## 2019-05-16 RX ADMIN — GABAPENTIN 100 MILLIGRAM(S): 400 CAPSULE ORAL at 05:08

## 2019-05-16 RX ADMIN — ISOSORBIDE DINITRATE 10 MILLIGRAM(S): 5 TABLET ORAL at 05:08

## 2019-05-16 RX ADMIN — Medication 20 MILLIGRAM(S): at 05:08

## 2019-05-16 RX ADMIN — Medication 10 UNIT(S): at 09:00

## 2019-05-16 RX ADMIN — Medication 2: at 13:37

## 2019-05-16 RX ADMIN — Medication 81 MILLIGRAM(S): at 13:34

## 2019-05-16 RX ADMIN — Medication 10 UNIT(S): at 17:54

## 2019-05-16 RX ADMIN — POLYETHYLENE GLYCOL 3350 17 GRAM(S): 17 POWDER, FOR SOLUTION ORAL at 17:53

## 2019-05-16 RX ADMIN — BUDESONIDE AND FORMOTEROL FUMARATE DIHYDRATE 2 PUFF(S): 160; 4.5 AEROSOL RESPIRATORY (INHALATION) at 08:59

## 2019-05-16 RX ADMIN — Medication 100 MILLIGRAM(S): at 05:08

## 2019-05-16 RX ADMIN — CHLORHEXIDINE GLUCONATE 1 APPLICATION(S): 213 SOLUTION TOPICAL at 13:26

## 2019-05-16 RX ADMIN — MONTELUKAST 10 MILLIGRAM(S): 4 TABLET, CHEWABLE ORAL at 13:35

## 2019-05-16 RX ADMIN — Medication 2: at 17:54

## 2019-05-16 RX ADMIN — Medication 40 MILLIGRAM(S): at 13:36

## 2019-05-16 RX ADMIN — Medication 100 MILLIGRAM(S): at 17:53

## 2019-05-16 RX ADMIN — Medication 12.5 MILLIGRAM(S): at 05:08

## 2019-05-16 RX ADMIN — BUMETANIDE 4 MILLIGRAM(S): 0.25 INJECTION INTRAMUSCULAR; INTRAVENOUS at 05:07

## 2019-05-16 RX ADMIN — TICAGRELOR 90 MILLIGRAM(S): 90 TABLET ORAL at 05:07

## 2019-05-16 RX ADMIN — Medication 10 UNIT(S): at 13:37

## 2019-05-16 RX ADMIN — GABAPENTIN 100 MILLIGRAM(S): 400 CAPSULE ORAL at 17:53

## 2019-05-16 RX ADMIN — PANTOPRAZOLE SODIUM 40 MILLIGRAM(S): 20 TABLET, DELAYED RELEASE ORAL at 05:07

## 2019-05-16 RX ADMIN — SPIRONOLACTONE 25 MILLIGRAM(S): 25 TABLET, FILM COATED ORAL at 05:07

## 2019-05-16 RX ADMIN — ISOSORBIDE DINITRATE 10 MILLIGRAM(S): 5 TABLET ORAL at 13:35

## 2019-05-16 RX ADMIN — Medication 20 MILLIGRAM(S): at 13:35

## 2019-05-16 RX ADMIN — Medication 1 SPRAY(S): at 13:37

## 2019-05-16 RX ADMIN — Medication 40 MILLIEQUIVALENT(S): at 08:59

## 2019-05-16 RX ADMIN — Medication 1 SPRAY(S): at 05:08

## 2019-05-16 RX ADMIN — TICAGRELOR 90 MILLIGRAM(S): 90 TABLET ORAL at 17:53

## 2019-05-16 RX ADMIN — Medication 50 MICROGRAM(S): at 05:07

## 2019-05-16 RX ADMIN — INSULIN GLARGINE 55 UNIT(S): 100 INJECTION, SOLUTION SUBCUTANEOUS at 00:26

## 2019-05-16 RX ADMIN — Medication 1: at 08:59

## 2019-05-16 NOTE — PROGRESS NOTE ADULT - ATTENDING COMMENTS
5/9: sob secondary to severe MR and chf exacerbation: for surgical evaluation:  5/10: May use bipap if requiried for increasing work of breathing: >? for alonso clip: defer to ccu  5/13: She seesm to eb doing ok but on meds including primacor as wellas diuretics: If her SOB increases, can u se bipap  5/14: No wheezing: cont Symbicort: most of the SOB I s due to chf and mitral regurgitation!!  5/15: repeat ct chest reviewed: seems to be doing better: Still with some evidence of chf  5/16: seems to be stable!

## 2019-05-16 NOTE — PROGRESS NOTE ADULT - PROBLEM SELECTOR PROBLEM 1
CHF exacerbation
Type 2 diabetes mellitus
Acute on chronic systolic heart failure
CHF exacerbation
CHF exacerbation
Type 2 diabetes mellitus
Acute on chronic systolic heart failure
CHF exacerbation
Type 2 diabetes mellitus
CHF exacerbation
Acute on chronic systolic heart failure
CHF exacerbation
Acute on chronic systolic heart failure
Acute on chronic systolic heart failure
Type 2 diabetes mellitus

## 2019-05-16 NOTE — PROGRESS NOTE ADULT - PROBLEM SELECTOR PROBLEM 6
Hypothyroidism
Hypothyroidism
Acute kidney injury superimposed on CKD
Hypothyroidism
Acute kidney injury superimposed on CKD
Acute kidney injury superimposed on CKD
Hypothyroid
Hypothyroidism
Acute kidney injury superimposed on CKD
Hypothyroidism
Hypothyroidism

## 2019-05-16 NOTE — PROGRESS NOTE ADULT - PROBLEM SELECTOR PLAN 5
4/27 heparin SQ
- Hgb A1c 7.3  - on home lantus 65 qHS and humalog 30 premeals  - c/w lantus 65U and humalog premeals  - ISS  - sugars well controlled  - DASH/ consistent carb diet
dvt prophyalxis
- Hgb A1c 7.3  - on home lantus 65 qHS and humalog 30 premeals  - c/w lantus 65U and humalog premeals  - ISS  - sugars well controlled  - DASH/ consistent carb diet
- Hgb A1c 7.3  - on home lantus 65 qHS and humalog 30 premeals  - c/w lantus 65U and humalog premeals  - ISS  - sugars well controlled  - DASH/ consistent carb diet
- patient with CKD III, Baseline Scr 1.5-1.8  - presented with Scr 2.21, currently 1.92  - continue to diurese as tolerated   - daily BMPs. Daily weights    - nephro following   - avoid nephrotoxins
4/27 heparin SQ  4/28 heparin SQ
dvt prophyalxis
- Hgb A1c 7.3  - on home lantus 65 qHS and humalog 30 premeals  - c/w lantus 65U and humalog premeals  - ISS  - sugars well controlled  - DASH/ consistent carb diet
dvt prophyalxis
dvt prophyalxis

## 2019-05-16 NOTE — PROGRESS NOTE ADULT - SUBJECTIVE AND OBJECTIVE BOX
SELVIN CLAIRE:[id number],   73yFemale followed for:  azithromycin (Hives; Pruritus)    PAST MEDICAL & SURGICAL HISTORY:  GERD (gastroesophageal reflux disease)  CAD (coronary artery disease): s/p CABG  Hypothyroidism  Hyperlipidemia  Diabetes mellitus, type 2  S/P CABG (coronary artery bypass graft)    FAMILY HISTORY:  Family history of hypertension (Sibling): sister  Family history of diabetes mellitus (Sibling): brothers/sisters    MEDICATIONS  (STANDING):  aspirin enteric coated 81 milliGRAM(s) Oral daily  atorvastatin 40 milliGRAM(s) Oral at bedtime  buDESOnide 160 MICROgram(s)/formoterol 4.5 MICROgram(s) Inhaler 2 Puff(s) Inhalation two times a day  buMETAnide 4 milliGRAM(s) Oral two times a day  chlorhexidine 4% Liquid 1 Application(s) Topical <User Schedule>  dextrose 5%. 1000 milliLiter(s) (50 mL/Hr) IV Continuous <Continuous>  dextrose 50% Injectable 12.5 Gram(s) IV Push once  dextrose 50% Injectable 25 Gram(s) IV Push once  dextrose 50% Injectable 25 Gram(s) IV Push once  docusate sodium 100 milliGRAM(s) Oral two times a day  ergocalciferol 00432 Unit(s) Oral <User Schedule>  gabapentin 100 milliGRAM(s) Oral two times a day  hydrALAZINE 20 milliGRAM(s) Oral three times a day  influenza   Vaccine 0.5 milliLiter(s) IntraMuscular once  insulin glargine Injectable (LANTUS) 55 Unit(s) SubCutaneous at bedtime  insulin lispro (HumaLOG) corrective regimen sliding scale   SubCutaneous three times a day before meals  insulin lispro (HumaLOG) corrective regimen sliding scale   SubCutaneous at bedtime  insulin lispro Injectable (HumaLOG) 10 Unit(s) SubCutaneous three times a day with meals  isosorbide   dinitrate Tablet (ISORDIL) 10 milliGRAM(s) Oral three times a day  levothyroxine 50 MICROGram(s) Oral daily  metolazone 2.5 milliGRAM(s) Oral <User Schedule>  metoprolol succinate ER 12.5 milliGRAM(s) Oral daily  montelukast 10 milliGRAM(s) Oral daily  pantoprazole    Tablet 40 milliGRAM(s) Oral before breakfast  polyethylene glycol 3350 17 Gram(s) Oral two times a day  potassium chloride   Powder 40 milliEquivalent(s) Oral once  predniSONE   Tablet   Oral   senna 2 Tablet(s) Oral at bedtime  sodium chloride 0.65% Nasal 1 Spray(s) Both Nostrils three times a day  spironolactone 25 milliGRAM(s) Oral daily  ticagrelor 90 milliGRAM(s) Oral two times a day    MEDICATIONS  (PRN):  acetaminophen   Tablet .. 650 milliGRAM(s) Oral every 6 hours PRN Mild Pain (1 - 3), Moderate Pain (4 - 6), Severe Pain (7 - 10)  ALPRAZolam 0.25 milliGRAM(s) Oral every 12 hours PRN panic attack  dextrose 40% Gel 15 Gram(s) Oral once PRN Blood Glucose LESS THAN 70 milliGRAM(s)/deciliter  glucagon  Injectable 1 milliGRAM(s) IntraMuscular once PRN Glucose LESS THAN 70 milligrams/deciliter      Vital Signs Last 24 Hrs  T(C): 36.9 (16 May 2019 05:05), Max: 36.9 (15 May 2019 20:43)  T(F): 98.4 (16 May 2019 05:05), Max: 98.5 (15 May 2019 20:43)  HR: 84 (16 May 2019 05:05) (75 - 86)  BP: 105/63 (16 May 2019 05:05) (95/53 - 107/56)  BP(mean): --  RR: 16 (16 May 2019 05:05) (16 - 18)  SpO2: 99% (16 May 2019 05:05) (97% - 100%)  nc/at  s1s2  cta  soft, nt, nd no guarding or rebound  no c/c/e    CBC Full  -  ( 16 May 2019 02:35 )  WBC Count : 10.45 K/uL  RBC Count : 3.13 M/uL  Hemoglobin : 8.8 g/dL  Hematocrit : 27.8 %  Platelet Count - Automated : 321 K/uL  Mean Cell Volume : 88.8 fL  Mean Cell Hemoglobin : 28.1 pg  Mean Cell Hemoglobin Concentration : 31.7 %  Auto Neutrophil # : 7.58 K/uL  Auto Lymphocyte # : 1.46 K/uL  Auto Monocyte # : 0.74 K/uL  Auto Eosinophil # : 0.46 K/uL  Auto Basophil # : 0.04 K/uL  Auto Neutrophil % : 72.5 %  Auto Lymphocyte % : 14.0 %  Auto Monocyte % : 7.1 %  Auto Eosinophil % : 4.4 %  Auto Basophil % : 0.4 %    05-16    136  |  99  |  67<H>  ----------------------------<  243<H>  3.7   |  19<L>  |  1.96<H>    Ca    10.0      16 May 2019 02:35  Mg     2.0     05-16

## 2019-05-16 NOTE — PROGRESS NOTE ADULT - PROBLEM SELECTOR PROBLEM 7
GERD (gastroesophageal reflux disease)
Hypothyroid
GERD (gastroesophageal reflux disease)
Hyperlipidemia
Hypothyroid
Hypothyroid
GERD (gastroesophageal reflux disease)
Hypothyroid
GERD (gastroesophageal reflux disease)
GERD (gastroesophageal reflux disease)

## 2019-05-16 NOTE — PROGRESS NOTE ADULT - PROBLEM SELECTOR PROBLEM 3
CHF exacerbation
CAD (coronary artery disease)
Hyperlipidemia
CHF exacerbation
Hyperlipidemia
CAD (coronary artery disease)
CHF exacerbation
Hyperlipidemia
NSVT (nonsustained ventricular tachycardia)
PNA (pneumonia)
PNA (pneumonia)
CAD (coronary artery disease)
CAD (coronary artery disease)
CHF exacerbation
Hyperlipidemia
PNA (pneumonia)
Hyperlipidemia
CAD (coronary artery disease)
PNA (pneumonia)
Hyperlipidemia
Hyperlipidemia

## 2019-05-16 NOTE — PROGRESS NOTE ADULT - PROBLEM SELECTOR PROBLEM 2
CAD (coronary artery disease)
Hypothyroidism
CAD (coronary artery disease)
Mitral regurgitation
CAD (coronary artery disease)
CAD (coronary artery disease)
Hypothyroidism
Troponin level elevated
Troponin level elevated
CAD (coronary artery disease)
Hypothyroidism
Mitral regurgitation
Troponin level elevated
Troponin level elevated
CAD (coronary artery disease)
Mitral regurgitation
Troponin level elevated
CAD (coronary artery disease)
Mitral regurgitation
Hypothyroidism

## 2019-05-16 NOTE — PROGRESS NOTE ADULT - PROBLEM SELECTOR PROBLEM 8
CKD (chronic kidney disease)
Hyperlipidemia
CKD (chronic kidney disease)
Hyperlipidemia
Hyperlipidemia
Prophylactic measure
CKD (chronic kidney disease)
Hyperlipidemia
CKD (chronic kidney disease)
CKD (chronic kidney disease)

## 2019-05-16 NOTE — PROGRESS NOTE ADULT - REASON FOR ADMISSION
sob

## 2019-05-16 NOTE — CHART NOTE - NSCHARTNOTEFT_GEN_A_CORE
Notified by RN that patient was experiencing some chest discomfort. It had resolved on its own after a few minutes. Pt is now without complaints. EKG was done: 82bpm, NSR, no changes from previous EKGs. Troponin sent. Will continue to monitor.     Vital Signs Last 24 Hrs  T(C): 36.9 (15 May 2019 20:43), Max: 36.9 (15 May 2019 20:43)  T(F): 98.5 (15 May 2019 20:43), Max: 98.5 (15 May 2019 20:43)  HR: 81 (16 May 2019 00:28) (74 - 86)  BP: 107/45 (16 May 2019 00:28) (95/53 - 114/58)  RR: 16 (16 May 2019 00:28) (16 - 18)  SpO2: 100% (16 May 2019 00:28) (97% - 100%)

## 2019-05-16 NOTE — PROGRESS NOTE ADULT - NSHPATTENDINGPLANDISCUSS_GEN_ALL_CORE
pa
Tele PA
CHF team
Team
Juan Daniel
CHF team
CCU team
CHF team
ELISABETH CCU ATTENDING:
NP
np
pa
rn
CHF team
CCU team

## 2019-05-16 NOTE — PROGRESS NOTE ADULT - PROBLEM SELECTOR PLAN 2
Plan is to get pt to rehab with outpatient follow up for the mitral clip eval (at [facility]). We will arrange appointment.

## 2019-05-16 NOTE — PROGRESS NOTE ADULT - SUBJECTIVE AND OBJECTIVE BOX
Chief complaint  Patient is a 73y old  Female who presents with a chief complaint of sob (16 May 2019 08:36)   Review of systems  Patient in bed, looks comfortable, no fever, no hypoglycemia.    Labs and Fingersticks  CAPILLARY BLOOD GLUCOSE      POCT Blood Glucose.: 176 mg/dL (16 May 2019 08:44)  POCT Blood Glucose.: 315 mg/dL (16 May 2019 00:23)  POCT Blood Glucose.: 199 mg/dL (15 May 2019 21:38)  POCT Blood Glucose.: 303 mg/dL (15 May 2019 17:04)  POCT Blood Glucose.: 228 mg/dL (15 May 2019 12:33)      Anion Gap, Serum: 18 <H> (05-16 @ 02:35)  Anion Gap, Serum: 15 <H> (05-15 @ 17:00)  Anion Gap, Serum: 13 (05-15 @ 05:40)  Anion Gap, Serum: 19 <H> (05-14 @ 18:45)      Calcium, Total Serum: 10.0 (05-16 @ 02:35)  Calcium, Total Serum: 10.0 (05-15 @ 17:00)  Calcium, Total Serum: 9.5 (05-15 @ 05:40)  Calcium, Total Serum: 9.7 (05-14 @ 18:45)          05-16    136  |  99  |  67<H>  ----------------------------<  243<H>  3.7   |  19<L>  |  1.96<H>    Ca    10.0      16 May 2019 02:35  Mg     2.0     05-16                          8.8    10.45 )-----------( 321      ( 16 May 2019 02:35 )             27.8     Medications  MEDICATIONS  (STANDING):  aspirin enteric coated 81 milliGRAM(s) Oral daily  atorvastatin 40 milliGRAM(s) Oral at bedtime  buDESOnide 160 MICROgram(s)/formoterol 4.5 MICROgram(s) Inhaler 2 Puff(s) Inhalation two times a day  buMETAnide 4 milliGRAM(s) Oral two times a day  chlorhexidine 4% Liquid 1 Application(s) Topical <User Schedule>  dextrose 5%. 1000 milliLiter(s) (50 mL/Hr) IV Continuous <Continuous>  dextrose 50% Injectable 12.5 Gram(s) IV Push once  dextrose 50% Injectable 25 Gram(s) IV Push once  dextrose 50% Injectable 25 Gram(s) IV Push once  docusate sodium 100 milliGRAM(s) Oral two times a day  ergocalciferol 67734 Unit(s) Oral <User Schedule>  gabapentin 100 milliGRAM(s) Oral two times a day  hydrALAZINE 20 milliGRAM(s) Oral three times a day  influenza   Vaccine 0.5 milliLiter(s) IntraMuscular once  insulin glargine Injectable (LANTUS) 55 Unit(s) SubCutaneous at bedtime  insulin lispro (HumaLOG) corrective regimen sliding scale   SubCutaneous three times a day before meals  insulin lispro (HumaLOG) corrective regimen sliding scale   SubCutaneous at bedtime  insulin lispro Injectable (HumaLOG) 10 Unit(s) SubCutaneous three times a day with meals  isosorbide   dinitrate Tablet (ISORDIL) 10 milliGRAM(s) Oral three times a day  levothyroxine 50 MICROGram(s) Oral daily  metolazone 2.5 milliGRAM(s) Oral <User Schedule>  metoprolol succinate ER 12.5 milliGRAM(s) Oral daily  montelukast 10 milliGRAM(s) Oral daily  pantoprazole    Tablet 40 milliGRAM(s) Oral before breakfast  polyethylene glycol 3350 17 Gram(s) Oral two times a day  predniSONE   Tablet   Oral   predniSONE   Tablet 20 milliGRAM(s) Oral daily  senna 2 Tablet(s) Oral at bedtime  sodium chloride 0.65% Nasal 1 Spray(s) Both Nostrils three times a day  spironolactone 25 milliGRAM(s) Oral daily  ticagrelor 90 milliGRAM(s) Oral two times a day      Physical Exam  General: Patient comfortable in bed  Vital Signs Last 12 Hrs  T(F): 98.4 (05-16-19 @ 05:05), Max: 98.4 (05-16-19 @ 05:05)  HR: 84 (05-16-19 @ 05:05) (81 - 84)  BP: 105/63 (05-16-19 @ 05:05) (102/54 - 107/45)  BP(mean): --  RR: 16 (05-16-19 @ 05:05) (16 - 17)  SpO2: 99% (05-16-19 @ 05:05) (97% - 100%)  Neck: No palpable thyroid nodules.  CVS: S1S2, No murmurs  Respiratory: No wheezing, no crepitations  GI: Abdomen soft, bowel sounds positive  Musculoskeletal:  edema lower extremities.   Skin: No skin rashes, no ecchymosis    Diagnostics    Thyroid Stimulating Hormone, Serum: AM Sched. Collection: 07-May-2019 06:00 (05-06 @ 10:38)  Free Thyroxine, Serum: AM Sched. Collection: 07-May-2019 06:00 (05-06 @ 10:38)  Cortisol, Serum (ESO): AM Sched. Collection: 15-May-2019 06:00 (05-14 @ 13:01)

## 2019-05-16 NOTE — PROGRESS NOTE ADULT - SUBJECTIVE AND OBJECTIVE BOX
Patient is a 73y old  Female who presents with a chief complaint of sob (16 May 2019 13:53)      Any change in ROS: off drips: lying down supine: no resp distress noted    MEDICATIONS  (STANDING):  aspirin enteric coated 81 milliGRAM(s) Oral daily  atorvastatin 40 milliGRAM(s) Oral at bedtime  buDESOnide 160 MICROgram(s)/formoterol 4.5 MICROgram(s) Inhaler 2 Puff(s) Inhalation two times a day  buMETAnide 4 milliGRAM(s) Oral two times a day  chlorhexidine 4% Liquid 1 Application(s) Topical <User Schedule>  dextrose 5%. 1000 milliLiter(s) (50 mL/Hr) IV Continuous <Continuous>  dextrose 50% Injectable 12.5 Gram(s) IV Push once  dextrose 50% Injectable 25 Gram(s) IV Push once  dextrose 50% Injectable 25 Gram(s) IV Push once  docusate sodium 100 milliGRAM(s) Oral two times a day  ergocalciferol 35252 Unit(s) Oral <User Schedule>  gabapentin 100 milliGRAM(s) Oral two times a day  hydrALAZINE 20 milliGRAM(s) Oral three times a day  influenza   Vaccine 0.5 milliLiter(s) IntraMuscular once  insulin glargine Injectable (LANTUS) 55 Unit(s) SubCutaneous at bedtime  insulin lispro (HumaLOG) corrective regimen sliding scale   SubCutaneous three times a day before meals  insulin lispro (HumaLOG) corrective regimen sliding scale   SubCutaneous at bedtime  insulin lispro Injectable (HumaLOG) 10 Unit(s) SubCutaneous three times a day with meals  isosorbide   dinitrate Tablet (ISORDIL) 10 milliGRAM(s) Oral three times a day  levothyroxine 50 MICROGram(s) Oral daily  metolazone 2.5 milliGRAM(s) Oral <User Schedule>  metoprolol succinate ER 12.5 milliGRAM(s) Oral daily  montelukast 10 milliGRAM(s) Oral daily  pantoprazole    Tablet 40 milliGRAM(s) Oral before breakfast  polyethylene glycol 3350 17 Gram(s) Oral two times a day  predniSONE   Tablet 40 milliGRAM(s) Oral daily  senna 2 Tablet(s) Oral at bedtime  sodium chloride 0.65% Nasal 1 Spray(s) Both Nostrils three times a day  spironolactone 25 milliGRAM(s) Oral daily  ticagrelor 90 milliGRAM(s) Oral two times a day    MEDICATIONS  (PRN):  acetaminophen   Tablet .. 650 milliGRAM(s) Oral every 6 hours PRN Mild Pain (1 - 3), Moderate Pain (4 - 6), Severe Pain (7 - 10)  ALPRAZolam 0.25 milliGRAM(s) Oral every 12 hours PRN panic attack  dextrose 40% Gel 15 Gram(s) Oral once PRN Blood Glucose LESS THAN 70 milliGRAM(s)/deciliter  glucagon  Injectable 1 milliGRAM(s) IntraMuscular once PRN Glucose LESS THAN 70 milligrams/deciliter    Vital Signs Last 24 Hrs  T(C): 37.3 (16 May 2019 14:49), Max: 37.3 (16 May 2019 14:49)  T(F): 99.1 (16 May 2019 14:49), Max: 99.1 (16 May 2019 14:49)  HR: 77 (16 May 2019 14:49) (75 - 84)  BP: 101/56 (16 May 2019 14:49) (95/53 - 107/45)  BP(mean): --  RR: 18 (16 May 2019 14:49) (16 - 18)  SpO2: 99% (16 May 2019 14:49) (97% - 100%)    I&O's Summary    15 May 2019 07:01  -  16 May 2019 07:00  --------------------------------------------------------  IN: 500 mL / OUT: 2050 mL / NET: -1550 mL          Physical Exam:   GENERAL: NAD, well-groomed, well-developed  HEENT: MOHSEN/   Atraumatic, Normocephalic  ENMT: No tonsillar erythema, exudates, or enlargement; Moist mucous membranes, Good dentition, No lesions  NECK: Supple, No JVD, Normal thyroid  CHEST/LUNG: Scattered crackles+  CVS: Regular rate and rhythm; No murmurs, rubs, or gallops  GI: : Soft, Nontender, Nondistended; Bowel sounds present  NERVOUS SYSTEM:  Alert & Oriented X3  EXTREMITIES:  -edema  LYMPH: No lymphadenopathy noted  SKIN: No rashes or lesions  ENDOCRINOLOGY: No Thyromegaly  PSYCH: Appropriate    Labs:  23                            8.8    10.45 )-----------( 321      ( 16 May 2019 02:35 )             27.8                         8.8    8.87  )-----------( 335      ( 15 May 2019 05:40 )             28.3                         9.1    9.74  )-----------( 337      ( 14 May 2019 05:50 )             29.4                         8.1    10.18 )-----------( 304      ( 13 May 2019 06:10 )             26.1     05-16    136  |  99  |  67<H>  ----------------------------<  243<H>  3.7   |  19<L>  |  1.96<H>  05-15    135  |  101  |  68<H>  ----------------------------<  304<H>  4.0   |  19<L>  |  2.04<H>  05-15    137  |  101  |  64<H>  ----------------------------<  266<H>  3.8   |  23  |  1.93<H>  05-14    138  |  101  |  70<H>  ----------------------------<  257<H>  4.3   |  18<L>  |  1.92<H>  05-14    139  |  100  |  70<H>  ----------------------------<  171<H>  3.8   |  22  |  2.04<H>  05-13    135  |  97<L>  |  74<H>  ----------------------------<  323<H>  4.3   |  23  |  2.11<H>  05-13    137  |  98  |  74<H>  ----------------------------<  330<H>  4.2   |  21<L>  |  2.11<H>  05-13    140  |  103  |  72<H>  ----------------------------<  160<H>  3.6   |  21<L>  |  2.09<H>    Ca    10.0      16 May 2019 02:35  Ca    10.0      15 May 2019 17:00  Ca    9.5      15 May 2019 05:40  Ca    9.7      14 May 2019 18:45  Mg     2.0     05-16  Mg     2.1     05-15  Mg     2.2     05-15  Mg     2.3     05-14    TPro  7.4  /  Alb  3.7  /  TBili  0.4  /  DBili  x   /  AST  19  /  ALT  23  /  AlkPhos  69  05-13    CAPILLARY BLOOD GLUCOSE      POCT Blood Glucose.: 214 mg/dL (16 May 2019 13:05)  POCT Blood Glucose.: 176 mg/dL (16 May 2019 08:44)  POCT Blood Glucose.: 315 mg/dL (16 May 2019 00:23)  POCT Blood Glucose.: 199 mg/dL (15 May 2019 21:38)  POCT Blood Glucose.: 303 mg/dL (15 May 2019 17:04)            Procalcitonin, Serum: 0.30 ng/mL (05-13 @ 19:14)  Procalcitonin, Serum: 0.34 ng/mL (05-13 @ 06:10)  Procalcitonin, Serum: 0.38 ng/mL (05-12 @ 18:25)        RECENT CULTURES:  05-13 @ 07:40 BLOOD       < from: CT Chest No Cont (05.13.19 @ 10:34) >    LUNGS AND AIRWAYS: Central airways are patent.    Patchy peribronchovascular-central groundglass opacities predominantly   within the mid to lower lungs may represent interstitial edema and are   decreased from prior exams.    Mild bandlike atelectasis at the bases and lingula.    8mm fissural nodule within the left midlung is unchanged (series 6 image   50).    PLEURA: No pleural effusion.    MEDIASTINUM AND ADIA: No lymphadenopathy.    VESSELS: Mild atherosclerotic calcifications at the aortic arch and   origins of great vessels.    HEART: Cardiomegaly. No pericardial effusion. Coronary artery   calcifications. CABG.    CHEST WALL AND LOWER NECK: Status post median sternotomy. Paraspinal   musculature fatty atrophy.    VISUALIZED UPPER ABDOMEN: Within normal limits.    BONES: Within normal limits.    IMPRESSION:     Patchy peribronchovascular-central groundglass opacities predominantly   within the mid to lower lungs may represent interstitial edema and are   decreased from prior exams.                SANDHYA BERUMEN M.D., RADIOLOGY RESIDENT    < end of copied text >             NO ORGANISMS ISOLATED  NO ORGANISMS ISOLATED AT 72 HRS.  05-12 @ 18:37 BLOOD PERIPHERAL                  NO ORGANISMS ISOLATED  NO ORGANISMS ISOLATED AT 72 HRS.        RESPIRATORY CULTURES:          Studies  Chest X-RAY  CT SCAN Chest   Venous Dopplers: LE:   CT Abdomen  Others

## 2019-05-16 NOTE — DISCHARGE NOTE NURSING/CASE MANAGEMENT/SOCIAL WORK - NSDCDPATPORTLINK_GEN_ALL_CORE
You can access the [facility] Patient Portal, offered by [facility], by registering with the following website: [email or web address]

## 2019-05-16 NOTE — PROGRESS NOTE ADULT - PROBLEM SELECTOR PROBLEM 4
Hyperlipidemia
Diabetes mellitus, type 2
Diabetes mellitus, type 2
Hyperlipidemia
CAD (coronary artery disease)
Diabetes mellitus, type 2
Hyperlipidemia
CAD (coronary artery disease)
CAD (coronary artery disease)
Diabetes mellitus, type 2
Hyperlipidemia
Type 2 diabetes mellitus
Diabetes mellitus, type 2
CAD (coronary artery disease)
Diabetes mellitus, type 2
Diabetes mellitus, type 2

## 2019-05-16 NOTE — PROGRESS NOTE ADULT - SUBJECTIVE AND OBJECTIVE BOX
Patient seen and examined. She wants to know when she is going home. Family wants pt to go to rehab.   No acute SOB, orthopnea, PND, CP, palpitations, dizziness.   Tolerated being off milrinone gtt and now on oral therapy.       Medications:  acetaminophen   Tablet .. 650 milliGRAM(s) Oral every 6 hours PRN  ALPRAZolam 0.25 milliGRAM(s) Oral every 12 hours PRN  aspirin enteric coated 81 milliGRAM(s) Oral daily  atorvastatin 40 milliGRAM(s) Oral at bedtime  buDESOnide 160 MICROgram(s)/formoterol 4.5 MICROgram(s) Inhaler 2 Puff(s) Inhalation two times a day  buMETAnide 4 milliGRAM(s) Oral two times a day  chlorhexidine 4% Liquid 1 Application(s) Topical <User Schedule>  dextrose 40% Gel 15 Gram(s) Oral once PRN  dextrose 5%. 1000 milliLiter(s) IV Continuous <Continuous>  dextrose 50% Injectable 12.5 Gram(s) IV Push once  dextrose 50% Injectable 25 Gram(s) IV Push once  dextrose 50% Injectable 25 Gram(s) IV Push once  docusate sodium 100 milliGRAM(s) Oral two times a day  ergocalciferol 09152 Unit(s) Oral <User Schedule>  gabapentin 100 milliGRAM(s) Oral two times a day  glucagon  Injectable 1 milliGRAM(s) IntraMuscular once PRN  hydrALAZINE 20 milliGRAM(s) Oral three times a day  influenza   Vaccine 0.5 milliLiter(s) IntraMuscular once  insulin glargine Injectable (LANTUS) 55 Unit(s) SubCutaneous at bedtime  insulin lispro (HumaLOG) corrective regimen sliding scale   SubCutaneous three times a day before meals  insulin lispro (HumaLOG) corrective regimen sliding scale   SubCutaneous at bedtime  insulin lispro Injectable (HumaLOG) 10 Unit(s) SubCutaneous three times a day with meals  isosorbide   dinitrate Tablet (ISORDIL) 10 milliGRAM(s) Oral three times a day  levothyroxine 50 MICROGram(s) Oral daily  metolazone 2.5 milliGRAM(s) Oral <User Schedule>  metoprolol succinate ER 12.5 milliGRAM(s) Oral daily  montelukast 10 milliGRAM(s) Oral daily  pantoprazole    Tablet 40 milliGRAM(s) Oral before breakfast  polyethylene glycol 3350 17 Gram(s) Oral two times a day  predniSONE   Tablet   Oral   predniSONE   Tablet 20 milliGRAM(s) Oral daily  senna 2 Tablet(s) Oral at bedtime  sodium chloride 0.65% Nasal 1 Spray(s) Both Nostrils three times a day  spironolactone 25 milliGRAM(s) Oral daily  ticagrelor 90 milliGRAM(s) Oral two times a day      Vitals:  Vital Signs Last 24 Hrs  T(C): 36.9 (16 May 2019 05:05), Max: 36.9 (15 May 2019 20:43)  T(F): 98.4 (16 May 2019 05:05), Max: 98.5 (15 May 2019 20:43)  HR: 84 (16 May 2019 05:05) (75 - 86)  BP: 105/63 (16 May 2019 05:05) (95/53 - 107/51)  BP(mean): --  RR: 16 (16 May 2019 05:05) (16 - 18)  SpO2: 99% (16 May 2019 05:05) (97% - 100%)    Daily     Daily Weight in k.2 (16 May 2019 05:05)    I&O's Detail    15 May 2019 07:01  -  16 May 2019 07:00  --------------------------------------------------------  IN:    Oral Fluid: 500 mL  Total IN: 500 mL    OUT:    Intermittent Catheterization - Urethral: 1330 mL    Voided: 720 mL  Total OUT: 2050 mL    Total NET: -1550 mL          Physical Exam:     General: No distress. Comfortable.  HEENT: EOM intact.  Neck: Neck supple. JVP not elevated. No masses  Chest: Clear to auscultation bilaterally  CV: S1 and S2 RRR. No murmurs, rub, or gallops. Radial pulses normal. No LE edema b/l. Warm b/l.   Abdomen: Soft, non-distended, non-tender  Skin: No rashes or skin breakdown  Neurology: Alert and oriented times three. Sensation intact  Psych: Affect normal    Labs:                        8.8    10.45 )-----------( 321      ( 16 May 2019 02:35 )             27.8         136  |  99  |  67<H>  ----------------------------<  243<H>  3.7   |  19<L>  |  1.96<H>    Ca    10.0      16 May 2019 02:35  Mg     2.0         < from: Transesophageal Echocardiogram w/o TTE (19 @ 18:25) >  OBSERVATIONS:  Mitral Valve: Mitral annular calcification and calcified  mitral leaflets which are tethered. The posterior mitral  leaflet is particularly tethered.  Severe mitral  regurgitation which originates along the coaptation line.  Aortic Root: Normal aortic root, aortic arch and descending  thoracic aorta.  Aortic Valve: Calcified trileaflet aortic valve with normal  opening.  Left Atrium: Left atrial enlargement. Left atrial appendage  could not be visualized, unclear if it was ligated during  prior CABG.  Left Ventricle: Severe  left ventricular systolic  dysfunction. Left ventricular enlargement.  Right Heart: Normal right atrium. Right ventricular  enlargement with decreased right ventricular systolic  function. Normal tricuspid valve. Normal pulmonic valve.  Pericardium/PleuraNormal pericardium with no pericardial  effusion.  Hemodynamic: Agitated saline injection demonstrates  evidence of a patent foramen ovale with left to right flow.    < end of copied text >

## 2019-05-16 NOTE — PROGRESS NOTE ADULT - ATTENDING COMMENTS
Patient seen and examined  Agree with above assessment and plan  patient awaiting rehab placement  tolerating current meds  Cont current treatment

## 2019-05-16 NOTE — PROGRESS NOTE ADULT - SUBJECTIVE AND OBJECTIVE BOX
Patient seen and examined at bedside  c/o foot pain similar to prior gout attacks  Case discussed with medical team    HPI:  73YOF with hx of CAD s/p CABG, hypothyroidism, CKD, CHF, HTN, DM p/w worsening ZEE and sob. Patient usually can walk up a few steps before feeling sob, currently feeling sob after walking from living room to kitchen.  She is using 3 pillows to sleep. No cough, fevers. (+) CP, SS and constant, She experienced more ZEE for last two days.  She has no edema in lower extremety.  Last admission to [facility] was 11/18. (24 Apr 2019 10:21)      PAST MEDICAL & SURGICAL HISTORY:  GERD (gastroesophageal reflux disease)  CAD (coronary artery disease): s/p CABG  Hypothyroidism  Hyperlipidemia  Diabetes mellitus, type 2  S/P CABG (coronary artery bypass graft)      azithromycin (Hives; Pruritus)       MEDICATIONS  (STANDING):  aspirin enteric coated 81 milliGRAM(s) Oral daily  atorvastatin 40 milliGRAM(s) Oral at bedtime  buDESOnide 160 MICROgram(s)/formoterol 4.5 MICROgram(s) Inhaler 2 Puff(s) Inhalation two times a day  buMETAnide 4 milliGRAM(s) Oral two times a day  chlorhexidine 4% Liquid 1 Application(s) Topical <User Schedule>  dextrose 5%. 1000 milliLiter(s) (50 mL/Hr) IV Continuous <Continuous>  dextrose 50% Injectable 12.5 Gram(s) IV Push once  dextrose 50% Injectable 25 Gram(s) IV Push once  dextrose 50% Injectable 25 Gram(s) IV Push once  docusate sodium 100 milliGRAM(s) Oral two times a day  ergocalciferol 61827 Unit(s) Oral <User Schedule>  gabapentin 100 milliGRAM(s) Oral two times a day  hydrALAZINE 20 milliGRAM(s) Oral three times a day  influenza   Vaccine 0.5 milliLiter(s) IntraMuscular once  insulin glargine Injectable (LANTUS) 55 Unit(s) SubCutaneous at bedtime  insulin lispro (HumaLOG) corrective regimen sliding scale   SubCutaneous three times a day before meals  insulin lispro (HumaLOG) corrective regimen sliding scale   SubCutaneous at bedtime  insulin lispro Injectable (HumaLOG) 10 Unit(s) SubCutaneous three times a day with meals  isosorbide   dinitrate Tablet (ISORDIL) 10 milliGRAM(s) Oral three times a day  levothyroxine 50 MICROGram(s) Oral daily  metolazone 2.5 milliGRAM(s) Oral <User Schedule>  metoprolol succinate ER 12.5 milliGRAM(s) Oral daily  montelukast 10 milliGRAM(s) Oral daily  pantoprazole    Tablet 40 milliGRAM(s) Oral before breakfast  polyethylene glycol 3350 17 Gram(s) Oral two times a day  predniSONE   Tablet   Oral   predniSONE   Tablet 20 milliGRAM(s) Oral daily  senna 2 Tablet(s) Oral at bedtime  sodium chloride 0.65% Nasal 1 Spray(s) Both Nostrils three times a day  spironolactone 25 milliGRAM(s) Oral daily  ticagrelor 90 milliGRAM(s) Oral two times a day    MEDICATIONS  (PRN):  acetaminophen   Tablet .. 650 milliGRAM(s) Oral every 6 hours PRN Mild Pain (1 - 3), Moderate Pain (4 - 6), Severe Pain (7 - 10)  ALPRAZolam 0.25 milliGRAM(s) Oral every 12 hours PRN panic attack  dextrose 40% Gel 15 Gram(s) Oral once PRN Blood Glucose LESS THAN 70 milliGRAM(s)/deciliter  glucagon  Injectable 1 milliGRAM(s) IntraMuscular once PRN Glucose LESS THAN 70 milligrams/deciliter      REVIEW OF SYSTEMS:  CONSTITUTIONAL: (+) malaise.   EYES: No acute change in vision   ENT:  No tinnitus  NECK: No stiffness  RESPIRATORY: zee. sob. No hemoptysis  CARDIOVASCULAR: decreased exerise tolerance. No chest pain, palpitations, syncope  GASTROINTESTINAL: No hematemesis, diarrhea, melena, or hematochezia.  GENITOURINARY: No hematuria  NEUROLOGICAL: No headaches  LYMPH Nodes: No enlarged glands  ENDOCRINE: No heat or cold intolerance	    T(C): 36.9 (05-16-19 @ 05:05), Max: 36.9 (05-15-19 @ 20:43)  HR: 84 (05-16-19 @ 05:05) (75 - 86)  BP: 105/63 (05-16-19 @ 05:05) (95/53 - 107/51)  RR: 16 (05-16-19 @ 05:05) (16 - 18)  SpO2: 99% (05-16-19 @ 05:05) (97% - 100%)    PHYSICAL EXAMINATION:   Constitutional: WD, NAD  HEENT: NC, AT  Neck:  Supple  Respiratory:  Adequate airflow b/l. Not using accessory muscles of respiration.  Cardiovascular:  sys murmur, S1 & S2 intact, no R/G, 2+ radial pulses b/l  Gastrointestinal: Soft, NT, ND, normoactive b.s., no organomegaly/RT/rigidity  Extremities: tender feet. WWP  Neurological:  Alert and awake.  No acute focal motor deficits. Crude sensation intact.     Labs and imaging reviewed    LABS:                        8.8    10.45 )-----------( 321      ( 16 May 2019 02:35 )             27.8     05-16    136  |  99  |  67<H>  ----------------------------<  243<H>  3.7   |  19<L>  |  1.96<H>    Ca    10.0      16 May 2019 02:35  Mg     2.0     05-16              CAPILLARY BLOOD GLUCOSE      POCT Blood Glucose.: 176 mg/dL (16 May 2019 08:44)  POCT Blood Glucose.: 315 mg/dL (16 May 2019 00:23)  POCT Blood Glucose.: 199 mg/dL (15 May 2019 21:38)  POCT Blood Glucose.: 303 mg/dL (15 May 2019 17:04)  POCT Blood Glucose.: 228 mg/dL (15 May 2019 12:33)              RADIOLOGY & ADDITIONAL STUDIES:

## 2019-05-16 NOTE — PROGRESS NOTE ADULT - PROBLEM SELECTOR PLAN 4
Will continue statin, primary team FU
4/27 FS with sliding scale.
Will continue statin, primary team FU
- s/p CABG  - c/w aspirin, brilinta, statin   - plan for possible cath when MERVIN resolves
Will continue statin, primary team FU
better controlled  5/13: STable!  5/14; cstill relatively uncontrolled: cont per PMD
- Hgb A1c 7.3  - on home lantus 65 qHS and humalog 30 premeals  - c/w lantus 65U and humalog premeals  - ISS  - sugars well controlled
- s/p CABG  - c/w aspirin, brilinta, statin   - plan for possible cath when MERVIN resolves
- s/p CABG  - c/w aspirin, brilinta, statin   - plan for possible cath when MERVIN resolves
4/27 FS with sliding scale.  4/28 FS with sliding scale. management per CCU
4/27 FS with sliding scale.  4/28 FS with sliding scale. management per CCU  4/29L: controlled!
4/27 FS with sliding scale.  4/28 FS with sliding scale. management per CCU  4/29L: controlled!  4/30: controlled
4/27 FS with sliding scale.  4/28 FS with sliding scale. management per CCU  4/29L: controlled!  4/30: controlled  5/2: stable!
4/27 FS with sliding scale.  4/28 FS with sliding scale. management per CCU  4/29L: controlled!  4/30: controlled  5/2: stable!  5/3: controlled
4/27 FS with sliding scale.  4/28 FS with sliding scale. management per CCU  4/29L: controlled!  4/30: controlled  5/2: stable!  5/3: controlled  54: stable!
4/27 FS with sliding scale.  4/28 FS with sliding scale. management per CCU  4/29L: controlled!  4/30: controlled  5/2: stable!  5/3: controlled  54: stable!  5/5: controlled!
Running high : control per primary team!
Running high : control per primary team!  5/7: per james  [facility]
Will continue statin, primary team FU
better controlled
better controlled
better controlled  5/10: cSofter: on diuretics: Cont to monitor:
better controlled  5/13: STable!  5/14; cstill relatively uncontrolled: cont per PMD  5/15: Control per priamry team
better controlled  5/13: STable!
- s/p CABG  - c/w aspirin, brilinta, statin   - plan for possible cath when MERVIN resolves
Running high : control per primary team!  5/7: per james
better controlled  5/13: STable!  5/14; cstill relatively uncontrolled: cont per PMD  5/16: stable : controlled  5/15: High off and on: contr per primary team

## 2019-05-16 NOTE — PROGRESS NOTE ADULT - ASSESSMENT
72 y/o female with pmhx of HTN, DM, CAD s/p CABG, HFrEF (LVEF 25%) severe MR, severe pulm HTN comes in with cough and SOB.   CT chest showed ground glass opacities, patient was started on IV zithromax, developed rash, now on Levaquin She was admitted to telemetry was given a dose of lopressor, patient became hypotensive and went into respiratory distress, patient was then moved to CCU. HF was consulted to help manage this patient who is SOB and is hypotensive. She admits to SOB at rest, orthopnea, cough. Denies fevers and sick contacts. No CP, palpitations, dizziness.   5/9/19 RHC showed RA 14, V 71/17, PA 69/20/41, PCWP 27, PA sat 39%, CO3.36, CI 2.37, SVR 1825. PVR 4.17. Pt was started on Bumex gtt and milirinone gtt at 0.25 mcg/kg/min. Pt responded well. Milrinone and bumex gtt stopped on 5/15/19 am.

## 2019-05-16 NOTE — PROGRESS NOTE ADULT - PROBLEM SELECTOR PLAN 7
4/27 PPI
- on home synthroid 50mcg
PPI
- c/w atorvastatin 40mg qHS
- on home synthroid 50mcg
- on home synthroid 50mcg
4/27 PPI  4/28 PPI
PPI
- on home synthroid 50mcg
PPI
PPI

## 2019-05-16 NOTE — PROGRESS NOTE ADULT - PROBLEM SELECTOR PLAN 3
-Continue ASA and Brilinta and Lipitor  [facility] 5/9/19- multiple vessel CAD, cont medical therapy.

## 2019-05-16 NOTE — PROGRESS NOTE ADULT - PROBLEM SELECTOR PROBLEM 5
Need for prophylactic measure
Type 2 diabetes mellitus
Acute kidney injury superimposed on CKD
Need for prophylactic measure
Type 2 diabetes mellitus
Type 2 diabetes mellitus
Need for prophylactic measure
Type 2 diabetes mellitus
Need for prophylactic measure
Need for prophylactic measure

## 2019-05-16 NOTE — PROGRESS NOTE ADULT - ASSESSMENT
73YOF with hx of CAD s/p CABG, hypothyroidism, CKD, CHF, HTN, DM p/w worsening ZEE and sob.    A/p  MERVIN  Etiology?  Likely sec to CHF  s/p cardiac cath 05/09  Renal function improving  changing to bumex po today. f/u cardio  monitor bmp  avoid nephrotoxic agents      CKD stage 3  baseline cr 1.5-1.8  likely sec to HTN/DM/chf  elevated PTH, vit d insufficieny, continue vit d 02001 QW x 4 dose   phos optimal    CHF  monitor daily weight and i/o  diuretics per cardio  f/u cardio

## 2019-05-16 NOTE — PROGRESS NOTE ADULT - PROBLEM SELECTOR PLAN 1
She is on diuretics: awaiting TRANSFER TO [facility]: RECED EXTRAS DOSES OF BUMEX TODAY  5/10: pt is in adhf: cath noted: being aggressively diuresed: Most of her breathing is secondary to chf: May use bipap for resp support if required:  5/13: events noted: in chf: has severe MR: on milrinone as wellas diuretics: she still seems SOB: Now family wants evaluation for surgery : She has not been wheezing: Would c ont BD!  5/14: breathing wise reasonable: needs to walk: no wheezing! cont cymbisort  5/15: no wheezing: cont symbicort!  5/16: stable; no wheezing

## 2019-05-16 NOTE — PROGRESS NOTE ADULT - SUBJECTIVE AND OBJECTIVE BOX
[facility] [place] [facility]   MD RAHEEL SHAH MD ANGELA WONG, PA    TEL:  OFFICE: [phone]  DR SANTOYO CELL: [phone]  DR. NGUYEN CELL: [phone]  UMM KENDALL CELL: [phone]        Patient is a 73y old  Female who presents with a chief complaint of sob (16 May 2019 11:04)      Patient seen and examined at bedside. No chest pain/sob    VITALS:  T(F): 98.2 (05-16-19 @ 13:25), Max: 98.5 (05-15-19 @ 20:43)  HR: 80 (05-16-19 @ 13:25)  BP: 103/62 (05-16-19 @ 13:25)  RR: 16 (05-16-19 @ 13:25)  SpO2: 100% (05-16-19 @ 13:25)  Wt(kg): --    05-15 @ 07:01  -  05-16 @ 07:00  --------------------------------------------------------  IN: 500 mL / OUT: 2050 mL / NET: -1550 mL          PHYSICAL EXAM:  Constitutional: NAD  Neck: No JVD  Respiratory: CTAB, no wheezes, rales or rhonchi  Cardiovascular: S1, S2, RRR  Gastrointestinal: BS+, soft, NT/ND  Extremities: No peripheral edema    Hospital Medications:   MEDICATIONS  (STANDING):  aspirin enteric coated 81 milliGRAM(s) Oral daily  atorvastatin 40 milliGRAM(s) Oral at bedtime  buDESOnide 160 MICROgram(s)/formoterol 4.5 MICROgram(s) Inhaler 2 Puff(s) Inhalation two times a day  buMETAnide 4 milliGRAM(s) Oral two times a day  chlorhexidine 4% Liquid 1 Application(s) Topical <User Schedule>  dextrose 5%. 1000 milliLiter(s) (50 mL/Hr) IV Continuous <Continuous>  dextrose 50% Injectable 12.5 Gram(s) IV Push once  dextrose 50% Injectable 25 Gram(s) IV Push once  dextrose 50% Injectable 25 Gram(s) IV Push once  docusate sodium 100 milliGRAM(s) Oral two times a day  ergocalciferol 08136 Unit(s) Oral <User Schedule>  gabapentin 100 milliGRAM(s) Oral two times a day  hydrALAZINE 20 milliGRAM(s) Oral three times a day  influenza   Vaccine 0.5 milliLiter(s) IntraMuscular once  insulin glargine Injectable (LANTUS) 55 Unit(s) SubCutaneous at bedtime  insulin lispro (HumaLOG) corrective regimen sliding scale   SubCutaneous three times a day before meals  insulin lispro (HumaLOG) corrective regimen sliding scale   SubCutaneous at bedtime  insulin lispro Injectable (HumaLOG) 10 Unit(s) SubCutaneous three times a day with meals  isosorbide   dinitrate Tablet (ISORDIL) 10 milliGRAM(s) Oral three times a day  levothyroxine 50 MICROGram(s) Oral daily  metolazone 2.5 milliGRAM(s) Oral <User Schedule>  metoprolol succinate ER 12.5 milliGRAM(s) Oral daily  montelukast 10 milliGRAM(s) Oral daily  pantoprazole    Tablet 40 milliGRAM(s) Oral before breakfast  polyethylene glycol 3350 17 Gram(s) Oral two times a day  predniSONE   Tablet 40 milliGRAM(s) Oral daily  senna 2 Tablet(s) Oral at bedtime  sodium chloride 0.65% Nasal 1 Spray(s) Both Nostrils three times a day  spironolactone 25 milliGRAM(s) Oral daily  ticagrelor 90 milliGRAM(s) Oral two times a day      LABS:  05-16    136  |  99  |  67<H>  ----------------------------<  243<H>  3.7   |  19<L>  |  1.96<H>    Ca    10.0      16 May 2019 02:35  Mg     2.0     05-16      Creatinine Trend: 1.96 <--, 2.04 <--, 1.93 <--, 1.92 <--, 2.04 <--, 2.11 <--, 2.11 <--, 2.09 <--, 2.37 <--, 2.31 <--, 2.37 <--                                8.8    10.45 )-----------( 321      ( 16 May 2019 02:35 )             27.8     Urine Studies:  Urinalysis - [05-10-19 @ 09:30]      Color LIGHT YELLOW / Appearance CLEAR / SG 1.010 / pH 6.0      Gluc NEGATIVE / Ketone NEGATIVE  / Bili NEGATIVE / Urobili NORMAL       Blood NEGATIVE / Protein NEGATIVE / Leuk Est NEGATIVE / Nitrite NEGATIVE      RBC  / WBC  / Hyaline  / Gran  / Sq Epi  / Non Sq Epi  / Bacteria       .4 (Ca --)      [04-25-19 @ 05:45]   --  PTH 43.55 (Ca --)      [09-10-18 @ 07:15]   --  PTH 36.60 (Ca --)      [09-08-18 @ 03:15]   --  Vitamin D (25OH) 25.9      [05-01-19 @ 04:00]  HbA1c 7.3      [04-25-19 @ 05:45]  TSH 3.44      [05-07-19 @ 07:00]  Lipid: chol 127, , HDL 22, LDL 67      [04-24-19 @ 12:21]    HCV 0.06, Nonreactive Hepatitis C AB  S/CO Ratio                        Interpretation  < 1.00                                   Non-Reactive  1.00 - 4.99                         Weakly-Reactive  > 5.00                                Reactive  Non-Reactive: A person with a non-reactive HCV antibody  result is considered uninfected.  No further action is  needed unless recent infection is suspected.  In these  cases, consider repeat testing later to detect  seroconversion..  Weakly-Reactive: HCV antibody test is abnormal, HCV RNA  Qualitative test will follow.  Reactive: HCV antibody test is abnormal, HCV RNA  Qualitative test will follow.  Note: HCV antibody testing is performed on the [employer]   system.      [04-25-19 @ 05:45]      RADIOLOGY & ADDITIONAL STUDIES:

## 2019-05-16 NOTE — PROGRESS NOTE ADULT - ASSESSMENT
74 YO F w/o h/o CAD, s/p CABG Systolic CHF, CKD 3b, who p/w sob and pino.      - Acute on Chronic Systolic CHF Exacerbation, and Severe Mitral Valve Regurgitation:      - persistent abnormal s/s      - c/w diuresis and cards meds      - All consultants management greatly appreciated!        - severe systolic dysfunction with severe MVR - pt/family agreeable to mitral valve clip - outpt referal      - c/w cardiac meds      - PT, strict i/o, tele, oob to chair       - ?? d/c planning tmrw    - Major Depressive Disorder vs Mood Disorder vs Alternative:       - psychosocial support       - Psych Consult and f/u recs/management     - Acute Gout:      - steroids.     - Symptomatic Supraventricular Tachycardia:      - improving       - c/w tele      - c/w bb       - all consultants & medical team members management greatly appreciated      - Panic Attack:       - xanax prn        - c/w optimization of co-morbidities    - Symptomatic Anemia 2/2 gastrointestinal bleed:      - relatively stable h/h      - slow bleed, conservative management for now      - monitor h/h, maintain hgb > 7     - NSTEMI:      - c/w rx      - adjust management prn      - tele    - MERVIN on CKD      - stable renal function.  trend renal function.      - optimize comorbidities. Avoid nephrotoxic rx     - DM II with long term complications of hyperglycemia, hypoglcyemia, and nephropathy   - c/w dm rx, as above    - monitor FS closely given hypoglycemia       - complicated by poor nutritional compliance (pt fed food from outside the hospital)       - c/w dm rx      - dm education

## 2019-05-16 NOTE — PROVIDER CONTACT NOTE (CRITICAL VALUE NOTIFICATION) - ACTION/TREATMENT ORDERED:
BMP draw post- hemodialysis
continue to monitor
continue to monitor
Continuing to monitor.
will continue to monitor

## 2019-05-16 NOTE — PROGRESS NOTE ADULT - ASSESSMENT
Assessment  DMT2: 73y Female with DM T2 with hyperglycemia, on insulin, dose was increased yesterday, blood sugars trending down now, eating full meals, she is still feeling weak.  CAD: on medications, stable, monitored.  Hypothyroidism: On Synthroid 50 mcg po daily, compliant with Synthroid intake, asymptomatic.  HTN: Controlled,  on antihypertensive medications.  SOB: On Tx, oxygen.  Obesity: No strict exercise routines, not on any weight loss program, neither on low calorie diet.          Jillian Banks MD  Cell: [phone]  Office: [phone]

## 2019-05-16 NOTE — PROGRESS NOTE ADULT - PROBLEM SELECTOR PLAN 2
cont current medications including diuretics: for ? surgical option now ? alonso clip  5/14; Now plan is to dc to rehab first and outpt evalution for alonso clip  5/15: Being agressively treated for Heart failure: ELISABETH CHFt eam  5/16: drips changed to oral diuretics: doing ok : now for rehab

## 2019-05-16 NOTE — PROGRESS NOTE ADULT - PROBLEM SELECTOR PLAN 1
Tolerated being off milrinone since yesterday am. Tolerated all other HF meds.   Diuresed -1.5 L in 24 hrs.   BUN/Cr improving.   -Continue bumex 4 mg po BID w/ metolazone 2.5 mg po qd on Thursdays and Mondays.   -continue hydral 20 mg po TID.   -Continue Isordil 10 mg po TID.  -Continue Toprol 12.5 mg po qd.   hypokalemic 3.7, supplemented with KCL 40 meq x 1. Please check BMP q 12 hrs and supplement to keep K 4.0-4.5 and mag 2.0.   She is cleared from HF standpoint to d/c plan to rehab. D/w case management and primary med team.

## 2019-05-17 LAB
BACTERIA BLD CULT: SIGNIFICANT CHANGE UP
BACTERIA BLD CULT: SIGNIFICANT CHANGE UP

## 2019-05-18 LAB — BACTERIA BLD CULT: SIGNIFICANT CHANGE UP

## 2019-05-28 ENCOUNTER — APPOINTMENT (OUTPATIENT)
Dept: CARDIOTHORACIC SURGERY | Facility: CLINIC | Age: 74
End: 2019-05-28

## 2019-05-28 DIAGNOSIS — E78.5 HYPERLIPIDEMIA, UNSPECIFIED: ICD-10-CM

## 2019-05-28 DIAGNOSIS — K21.9 GASTRO-ESOPHAGEAL REFLUX DISEASE W/OUT ESOPHAGITIS: ICD-10-CM

## 2019-05-28 DIAGNOSIS — E03.9 HYPOTHYROIDISM, UNSPECIFIED: ICD-10-CM

## 2019-05-31 NOTE — CONSULT LETTER
[FreeTextEntry2] : Bonilla Hay MD\par [phone] [place] [address]\[place]  [address]\par [phone]\par [phone] [FreeTextEntry3] : Anamika Newman MD\par Attending Surgeon \par [facility]\par  \par Cardiovascular & Thoracic Surgery\par [employer] [facility] [employer]\par \par

## 2019-05-31 NOTE — ASSESSMENT
[FreeTextEntry1] : This is a 73 year old female with past medical history of  HTN, HLD, T2DM, hypothyroidism, GERD, CAD S/P  CABG X 3 ( 1/29/14 w/ )  and PCI (most recent 11/2018 ),  chronic systolic CHF, LVEF 30-35% and Mitral Regurgitation.She is here for surgical consultation and management for mitral regurgitation.

## 2019-05-31 NOTE — DATA REVIEWED
[FreeTextEntry1] : 4/25/19 ECHO revealed EF 20 - 25%, Tethered mitral valve leaflets, Moderate Mitral Regurgitation. Mild tricuspid regurgitation.\par \par 5/1/19 LV revealed Mitral annular calcification and calcified mitral leaflets which are tethered. The posterior mitral leaflet is particularly tethered. Severe Mitral regurgitation which originates along the coaptation line.Agitated saline injection demonstrates evidence of patent foramen ovale with left to right flow. Systolic flow reversal seen in the right upper pulmonary vein.\par \par 5/9/19 Cardiac cath revealed Proximal LAD 70% stenosis, Mid % stenosis, Ostial % stenosis at the site of prior stent. Mid % stenosis .\par \par

## 2019-05-31 NOTE — HISTORY OF PRESENT ILLNESS
[FreeTextEntry1] : This is a 73 year old female with past medical history of  HTN, HLD, T2DM, hypothyroidism, GERD, CAD S/P  CABG X 3 ( 1/29/14 w/ )  and PCI (most recent 11/2018 ),  chronic systolic CHF, LVEF 30-35% and Mitral Regurgitation. She is here for surgical consultation and management for mitral regurgitation.

## 2019-06-01 ENCOUNTER — INPATIENT (INPATIENT)
Facility: HOSPITAL | Age: 74
LOS: 3 days | Discharge: ANOTHER HOSPITAL | End: 2019-06-05
Attending: INTERNAL MEDICINE | Admitting: INTERNAL MEDICINE
Payer: MEDICARE

## 2019-06-01 ENCOUNTER — TRANSCRIPTION ENCOUNTER (OUTPATIENT)
Age: 74
End: 2019-06-01

## 2019-06-01 VITALS
RESPIRATION RATE: 20 BRPM | OXYGEN SATURATION: 100 % | SYSTOLIC BLOOD PRESSURE: 106 MMHG | TEMPERATURE: 99 F | HEART RATE: 101 BPM | DIASTOLIC BLOOD PRESSURE: 73 MMHG

## 2019-06-01 DIAGNOSIS — I50.9 HEART FAILURE, UNSPECIFIED: ICD-10-CM

## 2019-06-01 DIAGNOSIS — E03.9 HYPOTHYROIDISM, UNSPECIFIED: ICD-10-CM

## 2019-06-01 DIAGNOSIS — E78.5 HYPERLIPIDEMIA, UNSPECIFIED: ICD-10-CM

## 2019-06-01 DIAGNOSIS — E11.9 TYPE 2 DIABETES MELLITUS WITHOUT COMPLICATIONS: ICD-10-CM

## 2019-06-01 DIAGNOSIS — I82.90 ACUTE EMBOLISM AND THROMBOSIS OF UNSPECIFIED VEIN: ICD-10-CM

## 2019-06-01 DIAGNOSIS — I25.10 ATHEROSCLEROTIC HEART DISEASE OF NATIVE CORONARY ARTERY WITHOUT ANGINA PECTORIS: ICD-10-CM

## 2019-06-01 DIAGNOSIS — J96.91 RESPIRATORY FAILURE, UNSPECIFIED WITH HYPOXIA: ICD-10-CM

## 2019-06-01 DIAGNOSIS — Z95.1 PRESENCE OF AORTOCORONARY BYPASS GRAFT: Chronic | ICD-10-CM

## 2019-06-01 LAB
ALBUMIN SERPL ELPH-MCNC: 3.7 G/DL — SIGNIFICANT CHANGE UP (ref 3.3–5)
ALP SERPL-CCNC: 116 U/L — SIGNIFICANT CHANGE UP (ref 40–120)
ALT FLD-CCNC: 23 U/L — SIGNIFICANT CHANGE UP (ref 4–33)
ANION GAP SERPL CALC-SCNC: 19 MMO/L — HIGH (ref 7–14)
ANION GAP SERPL CALC-SCNC: 20 MMO/L — HIGH (ref 7–14)
ANION GAP SERPL CALC-SCNC: 22 MMO/L — HIGH (ref 7–14)
APPEARANCE UR: CLEAR — SIGNIFICANT CHANGE UP
APTT BLD: 38.1 SEC — HIGH (ref 27.5–36.3)
AST SERPL-CCNC: 30 U/L — SIGNIFICANT CHANGE UP (ref 4–32)
B PERT DNA SPEC QL NAA+PROBE: NOT DETECTED — SIGNIFICANT CHANGE UP
BASE EXCESS BLDA CALC-SCNC: -7.3 MMOL/L — SIGNIFICANT CHANGE UP
BASE EXCESS BLDV CALC-SCNC: -7.2 MMOL/L — SIGNIFICANT CHANGE UP
BASE EXCESS BLDV CALC-SCNC: -8 MMOL/L — SIGNIFICANT CHANGE UP
BASOPHILS # BLD AUTO: 0.05 K/UL — SIGNIFICANT CHANGE UP (ref 0–0.2)
BASOPHILS NFR BLD AUTO: 0.3 % — SIGNIFICANT CHANGE UP (ref 0–2)
BILIRUB SERPL-MCNC: 0.3 MG/DL — SIGNIFICANT CHANGE UP (ref 0.2–1.2)
BILIRUB UR-MCNC: NEGATIVE — SIGNIFICANT CHANGE UP
BLOOD GAS ARTERIAL - FIO2: 50 — SIGNIFICANT CHANGE UP
BLOOD GAS VENOUS - CREATININE: 2.17 MG/DL — HIGH (ref 0.5–1.3)
BLOOD GAS VENOUS - CREATININE: 2.4 MG/DL — HIGH (ref 0.5–1.3)
BLOOD GAS VENOUS - FIO2: 100 — SIGNIFICANT CHANGE UP
BLOOD GAS VENOUS - FIO2: 50 — SIGNIFICANT CHANGE UP
BLOOD UR QL VISUAL: NEGATIVE — SIGNIFICANT CHANGE UP
BUN SERPL-MCNC: 57 MG/DL — HIGH (ref 7–23)
BUN SERPL-MCNC: 60 MG/DL — HIGH (ref 7–23)
BUN SERPL-MCNC: 61 MG/DL — HIGH (ref 7–23)
C PNEUM DNA SPEC QL NAA+PROBE: NOT DETECTED — SIGNIFICANT CHANGE UP
CALCIUM SERPL-MCNC: 8.6 MG/DL — SIGNIFICANT CHANGE UP (ref 8.4–10.5)
CALCIUM SERPL-MCNC: 9.3 MG/DL — SIGNIFICANT CHANGE UP (ref 8.4–10.5)
CALCIUM SERPL-MCNC: 9.5 MG/DL — SIGNIFICANT CHANGE UP (ref 8.4–10.5)
CHLORIDE BLDA-SCNC: 109 MMOL/L — HIGH (ref 96–108)
CHLORIDE BLDV-SCNC: 108 MMOL/L — SIGNIFICANT CHANGE UP (ref 96–108)
CHLORIDE BLDV-SCNC: 109 MMOL/L — HIGH (ref 96–108)
CHLORIDE SERPL-SCNC: 102 MMOL/L — SIGNIFICANT CHANGE UP (ref 98–107)
CHLORIDE SERPL-SCNC: 103 MMOL/L — SIGNIFICANT CHANGE UP (ref 98–107)
CHLORIDE SERPL-SCNC: 104 MMOL/L — SIGNIFICANT CHANGE UP (ref 98–107)
CHOLEST SERPL-MCNC: 148 MG/DL — SIGNIFICANT CHANGE UP (ref 120–199)
CK MB BLD-MCNC: 10.4 NG/ML — HIGH (ref 1–4.7)
CK MB BLD-MCNC: 6.02 NG/ML — HIGH (ref 1–4.7)
CK MB BLD-MCNC: SIGNIFICANT CHANGE UP (ref 0–2.5)
CK SERPL-CCNC: 142 U/L — SIGNIFICANT CHANGE UP (ref 25–170)
CK SERPL-CCNC: 93 U/L — SIGNIFICANT CHANGE UP (ref 25–170)
CO2 SERPL-SCNC: 16 MMOL/L — LOW (ref 22–31)
CO2 SERPL-SCNC: 16 MMOL/L — LOW (ref 22–31)
CO2 SERPL-SCNC: 19 MMOL/L — LOW (ref 22–31)
COLOR SPEC: COLORLESS — SIGNIFICANT CHANGE UP
CREAT SERPL-MCNC: 1.96 MG/DL — HIGH (ref 0.5–1.3)
CREAT SERPL-MCNC: 2.1 MG/DL — HIGH (ref 0.5–1.3)
CREAT SERPL-MCNC: 2.13 MG/DL — HIGH (ref 0.5–1.3)
EOSINOPHIL # BLD AUTO: 0.15 K/UL — SIGNIFICANT CHANGE UP (ref 0–0.5)
EOSINOPHIL NFR BLD AUTO: 0.8 % — SIGNIFICANT CHANGE UP (ref 0–6)
FLUAV H1 2009 PAND RNA SPEC QL NAA+PROBE: NOT DETECTED — SIGNIFICANT CHANGE UP
FLUAV H1 RNA SPEC QL NAA+PROBE: NOT DETECTED — SIGNIFICANT CHANGE UP
FLUAV H3 RNA SPEC QL NAA+PROBE: NOT DETECTED — SIGNIFICANT CHANGE UP
FLUAV SUBTYP SPEC NAA+PROBE: NOT DETECTED — SIGNIFICANT CHANGE UP
FLUBV RNA SPEC QL NAA+PROBE: NOT DETECTED — SIGNIFICANT CHANGE UP
GAS PNL BLDV: 138 MMOL/L — SIGNIFICANT CHANGE UP (ref 136–146)
GAS PNL BLDV: 139 MMOL/L — SIGNIFICANT CHANGE UP (ref 136–146)
GLUCOSE BLDA-MCNC: 308 MG/DL — HIGH (ref 70–99)
GLUCOSE BLDC GLUCOMTR-MCNC: 238 MG/DL — HIGH (ref 70–99)
GLUCOSE BLDC GLUCOMTR-MCNC: 348 MG/DL — HIGH (ref 70–99)
GLUCOSE BLDC GLUCOMTR-MCNC: 363 MG/DL — HIGH (ref 70–99)
GLUCOSE BLDC GLUCOMTR-MCNC: 399 MG/DL — HIGH (ref 70–99)
GLUCOSE BLDV-MCNC: 269 MG/DL — HIGH (ref 70–99)
GLUCOSE BLDV-MCNC: 327 MG/DL — HIGH (ref 70–99)
GLUCOSE SERPL-MCNC: 281 MG/DL — HIGH (ref 70–99)
GLUCOSE SERPL-MCNC: 320 MG/DL — HIGH (ref 70–99)
GLUCOSE SERPL-MCNC: 415 MG/DL — HIGH (ref 70–99)
GLUCOSE UR-MCNC: 500 — HIGH
HADV DNA SPEC QL NAA+PROBE: NOT DETECTED — SIGNIFICANT CHANGE UP
HBA1C BLD-MCNC: 7.8 % — HIGH (ref 4–5.6)
HCO3 BLDA-SCNC: 19 MMOL/L — LOW (ref 22–26)
HCO3 BLDV-SCNC: 18 MMOL/L — LOW (ref 20–27)
HCO3 BLDV-SCNC: 18 MMOL/L — LOW (ref 20–27)
HCOV PNL SPEC NAA+PROBE: SIGNIFICANT CHANGE UP
HCT VFR BLD CALC: 27.9 % — LOW (ref 34.5–45)
HCT VFR BLD CALC: 28 % — LOW (ref 34.5–45)
HCT VFR BLDA CALC: 24.3 % — LOW (ref 34.5–46.5)
HCT VFR BLDV CALC: 25.9 % — LOW (ref 34.5–45)
HCT VFR BLDV CALC: 29.3 % — LOW (ref 34.5–45)
HDLC SERPL-MCNC: 35 MG/DL — LOW (ref 45–65)
HGB BLD-MCNC: 8.4 G/DL — LOW (ref 11.5–15.5)
HGB BLD-MCNC: 8.4 G/DL — LOW (ref 11.5–15.5)
HGB BLDA-MCNC: 7.8 G/DL — LOW (ref 11.5–15.5)
HGB BLDV-MCNC: 8.3 G/DL — LOW (ref 11.5–15.5)
HGB BLDV-MCNC: 9.4 G/DL — LOW (ref 11.5–15.5)
HMPV RNA SPEC QL NAA+PROBE: NOT DETECTED — SIGNIFICANT CHANGE UP
HPIV1 RNA SPEC QL NAA+PROBE: NOT DETECTED — SIGNIFICANT CHANGE UP
HPIV2 RNA SPEC QL NAA+PROBE: NOT DETECTED — SIGNIFICANT CHANGE UP
HPIV3 RNA SPEC QL NAA+PROBE: NOT DETECTED — SIGNIFICANT CHANGE UP
HPIV4 RNA SPEC QL NAA+PROBE: NOT DETECTED — SIGNIFICANT CHANGE UP
IMM GRANULOCYTES NFR BLD AUTO: 2.4 % — HIGH (ref 0–1.5)
INR BLD: 1.12 — SIGNIFICANT CHANGE UP (ref 0.88–1.17)
KETONES UR-MCNC: NEGATIVE — SIGNIFICANT CHANGE UP
LACTATE BLDA-SCNC: 2 MMOL/L — SIGNIFICANT CHANGE UP (ref 0.5–2)
LACTATE BLDV-MCNC: 2.6 MMOL/L — HIGH (ref 0.5–2)
LACTATE BLDV-MCNC: 3.2 MMOL/L — HIGH (ref 0.5–2)
LEUKOCYTE ESTERASE UR-ACNC: NEGATIVE — SIGNIFICANT CHANGE UP
LIPID PNL WITH DIRECT LDL SERPL: 108 MG/DL — SIGNIFICANT CHANGE UP
LYMPHOCYTES # BLD AUTO: 1.67 K/UL — SIGNIFICANT CHANGE UP (ref 1–3.3)
LYMPHOCYTES # BLD AUTO: 8.7 % — LOW (ref 13–44)
MAGNESIUM SERPL-MCNC: 1.8 MG/DL — SIGNIFICANT CHANGE UP (ref 1.6–2.6)
MAGNESIUM SERPL-MCNC: 2.2 MG/DL — SIGNIFICANT CHANGE UP (ref 1.6–2.6)
MCHC RBC-ENTMCNC: 27.5 PG — SIGNIFICANT CHANGE UP (ref 27–34)
MCHC RBC-ENTMCNC: 27.6 PG — SIGNIFICANT CHANGE UP (ref 27–34)
MCHC RBC-ENTMCNC: 30 % — LOW (ref 32–36)
MCHC RBC-ENTMCNC: 30.1 % — LOW (ref 32–36)
MCV RBC AUTO: 91.5 FL — SIGNIFICANT CHANGE UP (ref 80–100)
MCV RBC AUTO: 91.8 FL — SIGNIFICANT CHANGE UP (ref 80–100)
MONOCYTES # BLD AUTO: 0.55 K/UL — SIGNIFICANT CHANGE UP (ref 0–0.9)
MONOCYTES NFR BLD AUTO: 2.9 % — SIGNIFICANT CHANGE UP (ref 2–14)
NEUTROPHILS # BLD AUTO: 16.39 K/UL — HIGH (ref 1.8–7.4)
NEUTROPHILS NFR BLD AUTO: 84.9 % — HIGH (ref 43–77)
NITRITE UR-MCNC: NEGATIVE — SIGNIFICANT CHANGE UP
NRBC # FLD: 0 K/UL — SIGNIFICANT CHANGE UP (ref 0–0)
NRBC # FLD: 0 K/UL — SIGNIFICANT CHANGE UP (ref 0–0)
NT-PROBNP SERPL-SCNC: 4102 PG/ML — SIGNIFICANT CHANGE UP
PCO2 BLDA: 28 MMHG — LOW (ref 32–48)
PCO2 BLDV: 31 MMHG — LOW (ref 41–51)
PCO2 BLDV: 40 MMHG — LOW (ref 41–51)
PH BLDA: 7.39 PH — SIGNIFICANT CHANGE UP (ref 7.35–7.45)
PH BLDV: 7.28 PH — LOW (ref 7.32–7.43)
PH BLDV: 7.35 PH — SIGNIFICANT CHANGE UP (ref 7.32–7.43)
PH UR: 6 — SIGNIFICANT CHANGE UP (ref 5–8)
PHOSPHATE SERPL-MCNC: 3.6 MG/DL — SIGNIFICANT CHANGE UP (ref 2.5–4.5)
PHOSPHATE SERPL-MCNC: 4.2 MG/DL — SIGNIFICANT CHANGE UP (ref 2.5–4.5)
PLATELET # BLD AUTO: 341 K/UL — SIGNIFICANT CHANGE UP (ref 150–400)
PLATELET # BLD AUTO: 342 K/UL — SIGNIFICANT CHANGE UP (ref 150–400)
PMV BLD: 9.1 FL — SIGNIFICANT CHANGE UP (ref 7–13)
PMV BLD: 9.3 FL — SIGNIFICANT CHANGE UP (ref 7–13)
PO2 BLDA: 148 MMHG — HIGH (ref 83–108)
PO2 BLDV: 31 MMHG — LOW (ref 35–40)
PO2 BLDV: 71 MMHG — HIGH (ref 35–40)
POTASSIUM BLDA-SCNC: 3.9 MMOL/L — SIGNIFICANT CHANGE UP (ref 3.4–4.5)
POTASSIUM BLDV-SCNC: 3.9 MMOL/L — SIGNIFICANT CHANGE UP (ref 3.4–4.5)
POTASSIUM BLDV-SCNC: 4 MMOL/L — SIGNIFICANT CHANGE UP (ref 3.4–4.5)
POTASSIUM SERPL-MCNC: 4 MMOL/L — SIGNIFICANT CHANGE UP (ref 3.5–5.3)
POTASSIUM SERPL-MCNC: 4.4 MMOL/L — SIGNIFICANT CHANGE UP (ref 3.5–5.3)
POTASSIUM SERPL-MCNC: 4.6 MMOL/L — SIGNIFICANT CHANGE UP (ref 3.5–5.3)
POTASSIUM SERPL-SCNC: 4 MMOL/L — SIGNIFICANT CHANGE UP (ref 3.5–5.3)
POTASSIUM SERPL-SCNC: 4.4 MMOL/L — SIGNIFICANT CHANGE UP (ref 3.5–5.3)
POTASSIUM SERPL-SCNC: 4.6 MMOL/L — SIGNIFICANT CHANGE UP (ref 3.5–5.3)
PROT SERPL-MCNC: 7.9 G/DL — SIGNIFICANT CHANGE UP (ref 6–8.3)
PROT UR-MCNC: NEGATIVE — SIGNIFICANT CHANGE UP
PROTHROM AB SERPL-ACNC: 12.8 SEC — SIGNIFICANT CHANGE UP (ref 9.8–13.1)
RBC # BLD: 3.04 M/UL — LOW (ref 3.8–5.2)
RBC # BLD: 3.06 M/UL — LOW (ref 3.8–5.2)
RBC # FLD: 16.4 % — HIGH (ref 10.3–14.5)
RBC # FLD: 16.5 % — HIGH (ref 10.3–14.5)
RSV RNA SPEC QL NAA+PROBE: NOT DETECTED — SIGNIFICANT CHANGE UP
RV+EV RNA SPEC QL NAA+PROBE: NOT DETECTED — SIGNIFICANT CHANGE UP
SAO2 % BLDA: 99 % — SIGNIFICANT CHANGE UP (ref 95–99)
SAO2 % BLDV: 42.2 % — LOW (ref 60–85)
SAO2 % BLDV: 91.6 % — HIGH (ref 60–85)
SODIUM BLDA-SCNC: 139 MMOL/L — SIGNIFICANT CHANGE UP (ref 136–146)
SODIUM SERPL-SCNC: 139 MMOL/L — SIGNIFICANT CHANGE UP (ref 135–145)
SODIUM SERPL-SCNC: 140 MMOL/L — SIGNIFICANT CHANGE UP (ref 135–145)
SODIUM SERPL-SCNC: 142 MMOL/L — SIGNIFICANT CHANGE UP (ref 135–145)
SP GR SPEC: 1.01 — SIGNIFICANT CHANGE UP (ref 1–1.04)
TRIGL SERPL-MCNC: 117 MG/DL — SIGNIFICANT CHANGE UP (ref 10–149)
TROPONIN T, HIGH SENSITIVITY: 102 NG/L — CRITICAL HIGH (ref ?–14)
TROPONIN T, HIGH SENSITIVITY: 114 NG/L — CRITICAL HIGH (ref ?–14)
TROPONIN T, HIGH SENSITIVITY: 199 NG/L — CRITICAL HIGH (ref ?–14)
TSH SERPL-MCNC: 0.75 UIU/ML — SIGNIFICANT CHANGE UP (ref 0.27–4.2)
UROBILINOGEN FLD QL: NORMAL — SIGNIFICANT CHANGE UP
WBC # BLD: 14.46 K/UL — HIGH (ref 3.8–10.5)
WBC # BLD: 19.27 K/UL — HIGH (ref 3.8–10.5)
WBC # FLD AUTO: 14.46 K/UL — HIGH (ref 3.8–10.5)
WBC # FLD AUTO: 19.27 K/UL — HIGH (ref 3.8–10.5)

## 2019-06-01 PROCEDURE — 71045 X-RAY EXAM CHEST 1 VIEW: CPT | Mod: 26

## 2019-06-01 PROCEDURE — 99223 1ST HOSP IP/OBS HIGH 75: CPT | Mod: GC

## 2019-06-01 PROCEDURE — 93306 TTE W/DOPPLER COMPLETE: CPT | Mod: 26

## 2019-06-01 RX ORDER — SPIRONOLACTONE 25 MG/1
25 TABLET, FILM COATED ORAL DAILY
Refills: 0 | Status: DISCONTINUED | OUTPATIENT
Start: 2019-06-01 | End: 2019-06-05

## 2019-06-01 RX ORDER — INSULIN LISPRO 100/ML
VIAL (ML) SUBCUTANEOUS AT BEDTIME
Refills: 0 | Status: DISCONTINUED | OUTPATIENT
Start: 2019-06-01 | End: 2019-06-05

## 2019-06-01 RX ORDER — BUMETANIDE 0.25 MG/ML
2 INJECTION INTRAMUSCULAR; INTRAVENOUS ONCE
Refills: 0 | Status: COMPLETED | OUTPATIENT
Start: 2019-06-01 | End: 2019-06-01

## 2019-06-01 RX ORDER — FUROSEMIDE 40 MG
80 TABLET ORAL ONCE
Refills: 0 | Status: COMPLETED | OUTPATIENT
Start: 2019-06-01 | End: 2019-06-01

## 2019-06-01 RX ORDER — SODIUM CHLORIDE 9 MG/ML
1000 INJECTION, SOLUTION INTRAVENOUS
Refills: 0 | Status: DISCONTINUED | OUTPATIENT
Start: 2019-06-01 | End: 2019-06-05

## 2019-06-01 RX ORDER — DEXTROSE 50 % IN WATER 50 %
15 SYRINGE (ML) INTRAVENOUS ONCE
Refills: 0 | Status: DISCONTINUED | OUTPATIENT
Start: 2019-06-01 | End: 2019-06-05

## 2019-06-01 RX ORDER — DEXTROSE 50 % IN WATER 50 %
25 SYRINGE (ML) INTRAVENOUS ONCE
Refills: 0 | Status: DISCONTINUED | OUTPATIENT
Start: 2019-06-01 | End: 2019-06-05

## 2019-06-01 RX ORDER — PANTOPRAZOLE SODIUM 20 MG/1
40 TABLET, DELAYED RELEASE ORAL
Refills: 0 | Status: DISCONTINUED | OUTPATIENT
Start: 2019-06-01 | End: 2019-06-05

## 2019-06-01 RX ORDER — INSULIN LISPRO 100/ML
VIAL (ML) SUBCUTANEOUS
Refills: 0 | Status: DISCONTINUED | OUTPATIENT
Start: 2019-06-01 | End: 2019-06-05

## 2019-06-01 RX ORDER — LEVOTHYROXINE SODIUM 125 MCG
50 TABLET ORAL DAILY
Refills: 0 | Status: DISCONTINUED | OUTPATIENT
Start: 2019-06-01 | End: 2019-06-05

## 2019-06-01 RX ORDER — HYDRALAZINE HCL 50 MG
20 TABLET ORAL THREE TIMES A DAY
Refills: 0 | Status: DISCONTINUED | OUTPATIENT
Start: 2019-06-01 | End: 2019-06-05

## 2019-06-01 RX ORDER — ISOSORBIDE DINITRATE 5 MG/1
10 TABLET ORAL THREE TIMES A DAY
Refills: 0 | Status: DISCONTINUED | OUTPATIENT
Start: 2019-06-01 | End: 2019-06-05

## 2019-06-01 RX ORDER — SPIRONOLACTONE 25 MG/1
25 TABLET, FILM COATED ORAL DAILY
Refills: 0 | Status: DISCONTINUED | OUTPATIENT
Start: 2019-06-01 | End: 2019-06-01

## 2019-06-01 RX ORDER — INSULIN GLARGINE 100 [IU]/ML
30 INJECTION, SOLUTION SUBCUTANEOUS AT BEDTIME
Refills: 0 | Status: DISCONTINUED | OUTPATIENT
Start: 2019-06-01 | End: 2019-06-01

## 2019-06-01 RX ORDER — INSULIN LISPRO 100/ML
13 VIAL (ML) SUBCUTANEOUS
Refills: 0 | Status: DISCONTINUED | OUTPATIENT
Start: 2019-06-01 | End: 2019-06-03

## 2019-06-01 RX ORDER — INSULIN LISPRO 100/ML
10 VIAL (ML) SUBCUTANEOUS
Refills: 0 | Status: DISCONTINUED | OUTPATIENT
Start: 2019-06-01 | End: 2019-06-01

## 2019-06-01 RX ORDER — BUMETANIDE 0.25 MG/ML
0.5 INJECTION INTRAMUSCULAR; INTRAVENOUS
Qty: 20 | Refills: 0 | Status: DISCONTINUED | OUTPATIENT
Start: 2019-06-01 | End: 2019-06-04

## 2019-06-01 RX ORDER — CHLORHEXIDINE GLUCONATE 213 G/1000ML
1 SOLUTION TOPICAL
Refills: 0 | Status: DISCONTINUED | OUTPATIENT
Start: 2019-06-01 | End: 2019-06-05

## 2019-06-01 RX ORDER — SENNA PLUS 8.6 MG/1
2 TABLET ORAL AT BEDTIME
Refills: 0 | Status: DISCONTINUED | OUTPATIENT
Start: 2019-06-01 | End: 2019-06-05

## 2019-06-01 RX ORDER — MAGNESIUM SULFATE 500 MG/ML
1 VIAL (ML) INJECTION ONCE
Refills: 0 | Status: COMPLETED | OUTPATIENT
Start: 2019-06-01 | End: 2019-06-01

## 2019-06-01 RX ORDER — CEFTRIAXONE 500 MG/1
1 INJECTION, POWDER, FOR SOLUTION INTRAMUSCULAR; INTRAVENOUS ONCE
Refills: 0 | Status: COMPLETED | OUTPATIENT
Start: 2019-06-01 | End: 2019-06-01

## 2019-06-01 RX ORDER — ATORVASTATIN CALCIUM 80 MG/1
40 TABLET, FILM COATED ORAL AT BEDTIME
Refills: 0 | Status: DISCONTINUED | OUTPATIENT
Start: 2019-06-01 | End: 2019-06-05

## 2019-06-01 RX ORDER — TICAGRELOR 90 MG/1
90 TABLET ORAL
Refills: 0 | Status: DISCONTINUED | OUTPATIENT
Start: 2019-06-01 | End: 2019-06-05

## 2019-06-01 RX ORDER — GLUCAGON INJECTION, SOLUTION 0.5 MG/.1ML
1 INJECTION, SOLUTION SUBCUTANEOUS ONCE
Refills: 0 | Status: DISCONTINUED | OUTPATIENT
Start: 2019-06-01 | End: 2019-06-05

## 2019-06-01 RX ORDER — DEXTROSE 50 % IN WATER 50 %
12.5 SYRINGE (ML) INTRAVENOUS ONCE
Refills: 0 | Status: DISCONTINUED | OUTPATIENT
Start: 2019-06-01 | End: 2019-06-05

## 2019-06-01 RX ORDER — ASPIRIN/CALCIUM CARB/MAGNESIUM 324 MG
81 TABLET ORAL DAILY
Refills: 0 | Status: DISCONTINUED | OUTPATIENT
Start: 2019-06-01 | End: 2019-06-05

## 2019-06-01 RX ORDER — INSULIN GLARGINE 100 [IU]/ML
40 INJECTION, SOLUTION SUBCUTANEOUS AT BEDTIME
Refills: 0 | Status: DISCONTINUED | OUTPATIENT
Start: 2019-06-01 | End: 2019-06-04

## 2019-06-01 RX ORDER — MONTELUKAST 4 MG/1
10 TABLET, CHEWABLE ORAL DAILY
Refills: 0 | Status: DISCONTINUED | OUTPATIENT
Start: 2019-06-01 | End: 2019-06-05

## 2019-06-01 RX ORDER — IPRATROPIUM/ALBUTEROL SULFATE 18-103MCG
3 AEROSOL WITH ADAPTER (GRAM) INHALATION ONCE
Refills: 0 | Status: COMPLETED | OUTPATIENT
Start: 2019-06-01 | End: 2019-06-01

## 2019-06-01 RX ORDER — HEPARIN SODIUM 5000 [USP'U]/ML
5000 INJECTION INTRAVENOUS; SUBCUTANEOUS EVERY 12 HOURS
Refills: 0 | Status: DISCONTINUED | OUTPATIENT
Start: 2019-06-01 | End: 2019-06-05

## 2019-06-01 RX ORDER — DOCUSATE SODIUM 100 MG
100 CAPSULE ORAL
Refills: 0 | Status: DISCONTINUED | OUTPATIENT
Start: 2019-06-01 | End: 2019-06-05

## 2019-06-01 RX ADMIN — Medication 20 MILLIGRAM(S): at 21:55

## 2019-06-01 RX ADMIN — CHLORHEXIDINE GLUCONATE 1 APPLICATION(S): 213 SOLUTION TOPICAL at 11:03

## 2019-06-01 RX ADMIN — BUMETANIDE 5 MG/HR: 0.25 INJECTION INTRAMUSCULAR; INTRAVENOUS at 19:55

## 2019-06-01 RX ADMIN — BUMETANIDE 2 MILLIGRAM(S): 0.25 INJECTION INTRAMUSCULAR; INTRAVENOUS at 07:40

## 2019-06-01 RX ADMIN — Medication 3 MILLILITER(S): at 05:29

## 2019-06-01 RX ADMIN — Medication 100 GRAM(S): at 11:02

## 2019-06-01 RX ADMIN — SENNA PLUS 2 TABLET(S): 8.6 TABLET ORAL at 21:56

## 2019-06-01 RX ADMIN — Medication 10 UNIT(S): at 17:16

## 2019-06-01 RX ADMIN — BUMETANIDE 5 MG/HR: 0.25 INJECTION INTRAMUSCULAR; INTRAVENOUS at 07:39

## 2019-06-01 RX ADMIN — Medication 20 MILLIGRAM(S): at 14:08

## 2019-06-01 RX ADMIN — Medication 5: at 12:15

## 2019-06-01 RX ADMIN — MONTELUKAST 10 MILLIGRAM(S): 4 TABLET, CHEWABLE ORAL at 12:15

## 2019-06-01 RX ADMIN — ISOSORBIDE DINITRATE 10 MILLIGRAM(S): 5 TABLET ORAL at 21:55

## 2019-06-01 RX ADMIN — HEPARIN SODIUM 5000 UNIT(S): 5000 INJECTION INTRAVENOUS; SUBCUTANEOUS at 17:41

## 2019-06-01 RX ADMIN — Medication 100 MILLIGRAM(S): at 17:41

## 2019-06-01 RX ADMIN — TICAGRELOR 90 MILLIGRAM(S): 90 TABLET ORAL at 17:41

## 2019-06-01 RX ADMIN — Medication 4: at 17:16

## 2019-06-01 RX ADMIN — ISOSORBIDE DINITRATE 10 MILLIGRAM(S): 5 TABLET ORAL at 14:08

## 2019-06-01 RX ADMIN — Medication 80 MILLIGRAM(S): at 05:03

## 2019-06-01 RX ADMIN — ATORVASTATIN CALCIUM 40 MILLIGRAM(S): 80 TABLET, FILM COATED ORAL at 21:55

## 2019-06-01 RX ADMIN — INSULIN GLARGINE 40 UNIT(S): 100 INJECTION, SOLUTION SUBCUTANEOUS at 21:55

## 2019-06-01 RX ADMIN — Medication 81 MILLIGRAM(S): at 12:15

## 2019-06-01 RX ADMIN — Medication 0: at 21:58

## 2019-06-01 RX ADMIN — Medication 5: at 08:18

## 2019-06-01 RX ADMIN — CEFTRIAXONE 100 GRAM(S): 500 INJECTION, POWDER, FOR SOLUTION INTRAMUSCULAR; INTRAVENOUS at 05:44

## 2019-06-01 NOTE — PATIENT PROFILE ADULT - NSPROHMDIABETMGMTSTRAT_GEN_A_NUR
diet modification/routine screenings/insulin therapy/exercise/blood glucose testing/medication therapy/weight management

## 2019-06-01 NOTE — ED PROVIDER NOTE - ATTENDING CONTRIBUTION TO CARE
Patient is a 74 yo F with history of DM, HTN, CAD s/p CABG, pulmonary HTN here for respiratory distress. Patient arrives tachypneic, unable to speak in full sentences, grunting. She was admitted from 4/24/19 to 5/16/19 for CHF exacerbation. She had a complicated course that required CCU admission.     VS noted  Gen. moderate distress, tachypneic, unable to speak in full sentences, grunting, accessory muscle use  HEENT: EOMI, dry MM  Lungs: diffuse wheezing  CVS: tachycardia  Abd; Soft non tender, non distended   Ext: b/l LE min pitting edema  Skin: no rash  Neuro AAOx3 non focal clear speech  a/p: pulmonary edema/ CHF exacerbation - ED US shows diffuse B-lines, bilateral pleural effusions. Patient placed on bipap.   - Arden BORJAS Patient is a 72 yo F with history of DM, HTN, CAD s/p CABG, pulmonary HTN here for respiratory distress. Patient arrives tachypneic, unable to speak in full sentences, grunting. She was admitted from 4/24/19 to 5/16/19 for CHF exacerbation. She had a complicated course that required CCU admission.     VS noted  Gen. moderate distress, tachypneic, unable to speak in full sentences, grunting, accessory muscle use  HEENT: EOMI, dry MM  Lungs: diffuse wheezing  CVS: tachycardia  Abd; Soft non tender, non distended   Ext: b/l LE min pitting edema  Skin: no rash  Neuro AAOx3 non focal clear speech  a/p: pulmonary edema/ CHF exacerbation - ED US shows diffuse B-lines, bilateral pleural effusions. Patient placed on bipap. Patient recently with extended hospital course that involved CCU admission. Will consult CCU. KEELY.   Blair Her MD

## 2019-06-01 NOTE — ED PROVIDER NOTE - PHYSICAL EXAMINATION
***GEN - respiratory distress, tired  ***HEAD - NC/AT  ***EYES/NOSE - PEERL, EOMI, mucous membranes moist, no discharge   ***THROAT: Oral cavity and pharynx normal.   ***NECK: supple, non-tender no lymphadenopathy  ***PULMONARY - diminished, coarse breath sounds diffusely with wheezes, intercostal retractions and subcostal retractions, poor air movement   ***CARDIAC- s1s2, tachycardic, no murmur  ***ABDOMEN - +BS, NT, soft, no guarding, no rebound, no organomegaly  ***BACK - no CVA tenderness, Normal  spine, no spinal TTP  ***EXTREMITIES - symmetric pulses, 2+ dp, capillary refill < 2 seconds, no clubbing, no cyanosis, 2+ pitting edema BL LE  ***SKIN - warm, dry, intact, no rash or bruising   ***NEUROLOGIC - a&o x3, CN 3-12 intact, sensation nl, motor 5/5

## 2019-06-01 NOTE — ED ADULT NURSE NOTE - OBJECTIVE STATEMENT
rec'd pt in room 14 in acute resp distress.  pt tachypneic, tachycardic, using accesory muscles, grunting, unable to speak in full sentences...only able to say a word at a time.  rec'd with 20 gauge to right ac. blood sent. skin intact. placed on cardiac monitor. st noted. md called to bedside. pt placed on bipap. will continue to closely monitor.

## 2019-06-01 NOTE — ED ADULT TRIAGE NOTE - CHIEF COMPLAINT QUOTE
Pt brought in by EMS from [facility], c/o SOB [id number], original O2 sat 89-90% on RA.  Pt placed on 3L O2 via NC.  Pt received 125mg solumedrol to 20g IVL R AC, and 1 combivent treatment.  Pt denies any chest pain/palpitations. PMHx:  COPD, CHF, DM, emphysema, hypothyroid, angina, CAD with CABG

## 2019-06-01 NOTE — ED CLERICAL - NS ED CLERK NOTE PRE-ARRIVAL INFORMATION; ADDITIONAL PRE-ARRIVAL INFORMATION
This patient is enrolled in the Heart Failure STARS readmission reduction initiative and has active care navigation.     - This patient can be followed up by the care navigation team within 24 hours.  To arrange close follow-up or to obtain additional clinical information , please call the contact number above    - For house cards patients, please call cardiology ([phone]) for ALL PATIENTS with a cardiac issue PRIOR to disposition if you are considering admission or observation.      - Consider CDU for management of CHF exacerbations per guidelines.

## 2019-06-01 NOTE — ED ADULT NURSE NOTE - INTERVENTIONS DEFINITIONS
Non-slip footwear when patient is off stretcher/Provide visual clues: red socks/Physically safe environment: no spills, clutter or unnecessary equipment/Stretcher in lowest position, wheels locked, appropriate side rails in place/Provide visual cue, wrist band, yellow gown, etc.

## 2019-06-01 NOTE — ED PROVIDER NOTE - CLINICAL SUMMARY MEDICAL DECISION MAKING FREE TEXT BOX
73F with respiratory failure in setting of CHF/COPD with last known ef 25%.  Tired, fatigued, retracting with poor air movement, wheezing with coarse breath sounds.  BL pitting edema.  POCUS showed B-lines diffusely and BL pleural effusions at bases, poor LV function, no pericardial effusion.  Received 3 b2b duonebs and solumedrol by ems, now starting BIPAP for CHF exacerbation.  Pressures in 110s, will hold on nitro gtt for now given concern for hypotension.  will give lasix 80mg for fluid overload.  Lethargic on exam likely in setting of hypercarbia and fatigue.  Will check vbg/abg.  Cardiac edwards.  CCU consult.  Will give dose of cftx in setting of fever.  RVP sent.  CXR showing pulmonary edema. 73F with respiratory failure in setting of chf with last known ef 25%.  Tired, fatigued, retracting with poor air movement, wheezing with coarse breath sounds.  BL pitting edema.  POCUS showed B-lines diffusely and BL pleural effusions at bases, poor LV function, no pericardial effusion.  Received 3 b2b duonebs and solumedrol by ems, now starting BIPAP for CHF exacerbation.  Pressures in 110s, will hold on nitro gtt for now given concern for hypotension.  will give lasix 80mg for fluid overload.  Lethargic on exam likely in setting of hypercarbia and fatigue.  Will check vbg/abg.  Cardiac edwards.  CCU consult.  Will give dose of cftx in setting of fever.  RVP sent.  CXR showing pulmonary edema.

## 2019-06-01 NOTE — ED ADULT NURSE NOTE - CHIEF COMPLAINT QUOTE
Pt brought in by EMS from [facility], c/o SOB [id number], original O2 sat 89-90% on RA.  Pt placed on 3L O2 via NC.  Pt received 125mg solumedrol to 20g IVL R AC, and 1 combivent treatment.  Pt denies any chest pain/palpitations. PMHx:  COPD, CHF, DM, emphysema, hypothyroid, angina, CAD with CABG

## 2019-06-01 NOTE — CONSULT NOTE ADULT - SUBJECTIVE AND OBJECTIVE BOX
[facility] [place] [facility]   MD RAHEEL SHAH MD RUORU WONG, PA    TEL:  OFFICE: [phone]  DR MUHAMMAD CELL: [phone]  JUSTEN KENDALL CELL: [phone]  DR. NGUYEN CELL: [phone]    -- INITIAL RENAL CONSULT NOTE  --------------------------------------------------------------------------------  HPI:    72 y/o female with PMHx of HTN, DM, CAD s/p CABG (LIMA to LAD, SVG to OM, SVG to PDA at [facility] 2014), HFrEF (LVEF 25%) severe MR, severe pulm HTN, hypothyroidism, presents from [facility] with shortness of breath  Nephrology consulted for duncan. Pt garcias with Dr. Muhammad for CKD  CKD thought to be sec to DM/HTN./CHF    PAST HISTORY  --------------------------------------------------------------------------------  PAST MEDICAL & SURGICAL HISTORY:  GERD (gastroesophageal reflux disease)  CAD (coronary artery disease): s/p CABG  Hypothyroidism  Hyperlipidemia  Diabetes mellitus, type 2  S/P CABG (coronary artery bypass graft)    FAMILY HISTORY:  Family history of hypertension (Sibling): sister  Family history of diabetes mellitus (Sibling): brothers/sisters    PAST SOCIAL HISTORY:    ALLERGIES & MEDICATIONS  --------------------------------------------------------------------------------  Allergies    azithromycin (Hives; Pruritus)    Intolerances      Standing Inpatient Medications  aspirin  chewable 81 milliGRAM(s) Oral daily  atorvastatin 40 milliGRAM(s) Oral at bedtime  buMETAnide Infusion 1 mG/Hr IV Continuous <Continuous>  chlorhexidine 4% Liquid 1 Application(s) Topical <User Schedule>  dextrose 5%. 1000 milliLiter(s) IV Continuous <Continuous>  dextrose 50% Injectable 12.5 Gram(s) IV Push once  dextrose 50% Injectable 25 Gram(s) IV Push once  dextrose 50% Injectable 25 Gram(s) IV Push once  docusate sodium 100 milliGRAM(s) Oral two times a day  heparin  Injectable 5000 Unit(s) SubCutaneous every 12 hours  hydrALAZINE 20 milliGRAM(s) Oral three times a day  insulin glargine Injectable (LANTUS) 30 Unit(s) SubCutaneous at bedtime  insulin lispro (HumaLOG) corrective regimen sliding scale   SubCutaneous three times a day before meals  insulin lispro (HumaLOG) corrective regimen sliding scale   SubCutaneous at bedtime  isosorbide   dinitrate Tablet (ISORDIL) 10 milliGRAM(s) Oral three times a day  levothyroxine 50 MICROGram(s) Oral daily  metolazone 2.5 milliGRAM(s) Oral once  montelukast 10 milliGRAM(s) Oral daily  pantoprazole    Tablet 40 milliGRAM(s) Oral before breakfast  senna 2 Tablet(s) Oral at bedtime  spironolactone 25 milliGRAM(s) Oral daily  ticagrelor 90 milliGRAM(s) Oral two times a day    PRN Inpatient Medications  dextrose 40% Gel 15 Gram(s) Oral once PRN  glucagon  Injectable 1 milliGRAM(s) IntraMuscular once PRN      REVIEW OF SYSTEMS  --------------------------------------------------------------------------------  Gen: No fevers/chills  Skin: No rashes  Head/Eyes/Ears: Normal hearing,  Normal vision   Respiratory: No dyspnea, cough  CV: No chest pain  GI: No abdominal pain, diarrhea, constipation, nausea, vomiting  : No dysuria, hematuria  MSK: No  edema  Heme: No easy bruising or bleeding  Psych: No significant depression    All other systems were reviewed and are negative, except as noted.    VITALS/PHYSICAL EXAM  --------------------------------------------------------------------------------  T(C): 37.2 (06-01-19 @ 06:33), Max: 37.7 (06-01-19 @ 03:27)  HR: 88 (06-01-19 @ 09:00) (84 - 104)  BP: 113/58 (06-01-19 @ 09:00) (98/51 - 128/56)  RR: 32 (06-01-19 @ 09:00) (16 - 47)  SpO2: 100% (06-01-19 @ 09:00) (97% - 100%)  Wt(kg): --  Height (cm): 149.86 (06-01-19 @ 06:33)  Weight (kg): 57.4 (06-01-19 @ 06:33)  BMI (kg/m2): 25.6 (06-01-19 @ 06:33)  BSA (m2): 1.52 (06-01-19 @ 06:33)      06-01-19 @ 07:01  -  06-01-19 @ 09:22  --------------------------------------------------------  IN: 5 mL / OUT: 900 mL / NET: -895 mL      Physical Exam:  	Gen: NAD  	HEENT: MMM  	Pulm: Crackles B/L  	CV: S1S2  	Abd: Soft, +BS   	Ext: No LE edema B/L  	Neuro: Awake  	Skin: Warm and dry  	    LABS/STUDIES  --------------------------------------------------------------------------------              8.4    14.46 >-----------<  342      [06-01-19 @ 08:00]              28.0     140  |  102  |  60  ----------------------------<  415      [06-01-19 @ 08:00]  4.4   |  16  |  2.10        Ca     9.5     [06-01-19 @ 08:00]      Mg     1.8     [06-01-19 @ 08:00]      Phos  4.2     [06-01-19 @ 08:00]    TPro  7.9  /  Alb  3.7  /  TBili  0.3  /  DBili  x   /  AST  30  /  ALT  23  /  AlkPhos  116  [06-01-19 @ 03:05]    PT/INR: PT 12.8 , INR 1.12       [06-01-19 @ 03:23]  PTT: 38.1       [06-01-19 @ 03:23]          [06-01-19 @ 08:00]    Creatinine Trend:  SCr 2.10 [06-01 @ 08:00]  SCr 2.13 [06-01 @ 03:05]  SCr 1.96 [05-16 @ 02:35]  SCr 2.04 [05-15 @ 17:00]  SCr 1.93 [05-15 @ 05:40]    Urinalysis - [06-01-19 @ 08:00]      Color COLORLESS / Appearance CLEAR / SG 1.009 / pH 6.0      Gluc 500 / Ketone NEGATIVE  / Bili NEGATIVE / Urobili NORMAL       Blood NEGATIVE / Protein NEGATIVE / Leuk Est NEGATIVE / Nitrite NEGATIVE      RBC  / WBC  / Hyaline  / Gran  / Sq Epi  / Non Sq Epi  / Bacteria       .4 (Ca --)      [04-25-19 @ 05:45]   --  PTH 43.55 (Ca --)      [09-10-18 @ 07:15]   --  PTH 36.60 (Ca --)      [09-08-18 @ 03:15]   --  Vitamin D (25OH) 25.9      [05-01-19 @ 04:00]  HbA1c 7.8      [06-01-19 @ 08:00]  TSH 0.75      [06-01-19 @ 08:00]  Lipid: chol 148, , HDL 35,       [06-01-19 @ 08:00]    HBsAg Nonreactive      [04-01-15 @ 15:00]  HCV 0.06, Nonreactive Hepatitis C AB  S/CO Ratio                        Interpretation  < 1.00                                   Non-Reactive  1.00 - 4.99                         Weakly-Reactive  > 5.00                                Reactive  Non-Reactive: A person with a non-reactive HCV antibody  result is considered uninfected.  No further action is  needed unless recent infection is suspected.  In these  cases, consider repeat testing later to detect  seroconversion..  Weakly-Reactive: HCV antibody test is abnormal, HCV RNA  Qualitative test will follow.  Reactive: HCV antibody test is abnormal, HCV RNA  Qualitative test will follow.  Note: HCV antibody testing is performed on the Abbott   system.      [04-25-19 @ 05:45] [facility] [place] [facility]   MD RAHEEL SHAH MD RUORU WONG, PA    TEL:  OFFICE: [phone]  DR MUHAMMAD CELL: [phone]  JUSTEN KENDALL CELL: [phone]  DR. NGUYEN CELL: [phone]    -- INITIAL RENAL CONSULT NOTE  --------------------------------------------------------------------------------  HPI:    72 y/o female with PMHx of HTN, DM, CAD s/p CABG (LIMA to LAD, SVG to OM, SVG to PDA at [facility] 2014), HFrEF (LVEF 25%) severe MR, severe pulm HTN, hypothyroidism, presents from [facility] with shortness of breath  Nephrology consulted for duncan. Pt garcias with Dr. Muhammad for CKD  CKD thought to be sec to DM/HTN./CHF    PAST HISTORY  --------------------------------------------------------------------------------  PAST MEDICAL & SURGICAL HISTORY:  GERD (gastroesophageal reflux disease)  CAD (coronary artery disease): s/p CABG  Hypothyroidism  Hyperlipidemia  Diabetes mellitus, type 2  S/P CABG (coronary artery bypass graft)    FAMILY HISTORY:  Family history of hypertension (Sibling): sister  Family history of diabetes mellitus (Sibling): brothers/sisters    PAST SOCIAL HISTORY:    ALLERGIES & MEDICATIONS  --------------------------------------------------------------------------------  Allergies    azithromycin (Hives; Pruritus)    Intolerances      Standing Inpatient Medications  aspirin  chewable 81 milliGRAM(s) Oral daily  atorvastatin 40 milliGRAM(s) Oral at bedtime  buMETAnide Infusion 1 mG/Hr IV Continuous <Continuous>  chlorhexidine 4% Liquid 1 Application(s) Topical <User Schedule>  dextrose 5%. 1000 milliLiter(s) IV Continuous <Continuous>  dextrose 50% Injectable 12.5 Gram(s) IV Push once  dextrose 50% Injectable 25 Gram(s) IV Push once  dextrose 50% Injectable 25 Gram(s) IV Push once  docusate sodium 100 milliGRAM(s) Oral two times a day  heparin  Injectable 5000 Unit(s) SubCutaneous every 12 hours  hydrALAZINE 20 milliGRAM(s) Oral three times a day  insulin glargine Injectable (LANTUS) 30 Unit(s) SubCutaneous at bedtime  insulin lispro (HumaLOG) corrective regimen sliding scale   SubCutaneous three times a day before meals  insulin lispro (HumaLOG) corrective regimen sliding scale   SubCutaneous at bedtime  isosorbide   dinitrate Tablet (ISORDIL) 10 milliGRAM(s) Oral three times a day  levothyroxine 50 MICROGram(s) Oral daily  metolazone 2.5 milliGRAM(s) Oral once  montelukast 10 milliGRAM(s) Oral daily  pantoprazole    Tablet 40 milliGRAM(s) Oral before breakfast  senna 2 Tablet(s) Oral at bedtime  spironolactone 25 milliGRAM(s) Oral daily  ticagrelor 90 milliGRAM(s) Oral two times a day    PRN Inpatient Medications  dextrose 40% Gel 15 Gram(s) Oral once PRN  glucagon  Injectable 1 milliGRAM(s) IntraMuscular once PRN      REVIEW OF SYSTEMS  --------------------------------------------------------------------------------  Gen: No fevers/chills  Skin: No rashes  Head/Eyes/Ears: Normal hearing,  Normal vision   Respiratory: + dyspnea, cough  CV: No chest pain  GI: No abdominal pain, diarrhea,  : No dysuria, hematuria  MSK: No  edema  Heme: No easy bruising or bleeding  Psych: No significant depression    All other systems were reviewed and are negative, except as noted.    VITALS/PHYSICAL EXAM  --------------------------------------------------------------------------------  T(C): 37.2 (06-01-19 @ 06:33), Max: 37.7 (06-01-19 @ 03:27)  HR: 88 (06-01-19 @ 09:00) (84 - 104)  BP: 113/58 (06-01-19 @ 09:00) (98/51 - 128/56)  RR: 32 (06-01-19 @ 09:00) (16 - 47)  SpO2: 100% (06-01-19 @ 09:00) (97% - 100%)  Wt(kg): --  Height (cm): 149.86 (06-01-19 @ 06:33)  Weight (kg): 57.4 (06-01-19 @ 06:33)  BMI (kg/m2): 25.6 (06-01-19 @ 06:33)  BSA (m2): 1.52 (06-01-19 @ 06:33)      06-01-19 @ 07:01  -  06-01-19 @ 09:22  --------------------------------------------------------  IN: 5 mL / OUT: 900 mL / NET: -895 mL      Physical Exam:  	Gen: female on bipap  	HEENT: MMM  	Pulm: Crackles B/L  	CV: S1S2  	Abd: Soft, +BS   	Ext: No LE edema B/L  	Neuro: Awake  	Skin: Warm and dry  	JUAN FRANCISCO cuellar present    LABS/STUDIES  --------------------------------------------------------------------------------              8.4    14.46 >-----------<  342      [06-01-19 @ 08:00]              28.0     140  |  102  |  60  ----------------------------<  415      [06-01-19 @ 08:00]  4.4   |  16  |  2.10        Ca     9.5     [06-01-19 @ 08:00]      Mg     1.8     [06-01-19 @ 08:00]      Phos  4.2     [06-01-19 @ 08:00]    TPro  7.9  /  Alb  3.7  /  TBili  0.3  /  DBili  x   /  AST  30  /  ALT  23  /  AlkPhos  116  [06-01-19 @ 03:05]    PT/INR: PT 12.8 , INR 1.12       [06-01-19 @ 03:23]  PTT: 38.1       [06-01-19 @ 03:23]          [06-01-19 @ 08:00]    Creatinine Trend:  SCr 2.10 [06-01 @ 08:00]  SCr 2.13 [06-01 @ 03:05]  SCr 1.96 [05-16 @ 02:35]  SCr 2.04 [05-15 @ 17:00]  SCr 1.93 [05-15 @ 05:40]    Urinalysis - [06-01-19 @ 08:00]      Color COLORLESS / Appearance CLEAR / SG 1.009 / pH 6.0      Gluc 500 / Ketone NEGATIVE  / Bili NEGATIVE / Urobili NORMAL       Blood NEGATIVE / Protein NEGATIVE / Leuk Est NEGATIVE / Nitrite NEGATIVE      RBC  / WBC  / Hyaline  / Gran  / Sq Epi  / Non Sq Epi  / Bacteria       .4 (Ca --)      [04-25-19 @ 05:45]   --  PTH 43.55 (Ca --)      [09-10-18 @ 07:15]   --  PTH 36.60 (Ca --)      [09-08-18 @ 03:15]   --  Vitamin D (25OH) 25.9      [05-01-19 @ 04:00]  HbA1c 7.8      [06-01-19 @ 08:00]  TSH 0.75      [06-01-19 @ 08:00]  Lipid: chol 148, , HDL 35,       [06-01-19 @ 08:00]    HBsAg Nonreactive      [04-01-15 @ 15:00]  HCV 0.06, Nonreactive Hepatitis C AB  S/CO Ratio                        Interpretation  < 1.00                                   Non-Reactive  1.00 - 4.99                         Weakly-Reactive  > 5.00                                Reactive  Non-Reactive: A person with a non-reactive HCV antibody  result is considered uninfected.  No further action is  needed unless recent infection is suspected.  In these  cases, consider repeat testing later to detect  seroconversion..  Weakly-Reactive: HCV antibody test is abnormal, HCV RNA  Qualitative test will follow.  Reactive: HCV antibody test is abnormal, HCV RNA  Qualitative test will follow.  Note: HCV antibody testing is performed on the Abbott   system.      [04-25-19 @ 05:45]

## 2019-06-01 NOTE — H&P ADULT - HISTORY OF PRESENT ILLNESS
72 y/o female with pmhx of HTN, DM, CAD s/p CABG, HFrEF (LVEF 25%) severe MR, severe pulm HTN presents from [facility] with shortness of breath, increased wob and fatigue starting earlier today.  Also complaining of chest pain described as tightness/pressure associated with nausea.  Recently admitted for CHF exacerbation in setting of pneumonia to CCU on Bumex gtt and Milrinone   [facility] 5/9/19:  triple vessel disease - treat medically, RHC showed RA 14, V 71/17, PA 69/20/41, PCWP 27, PA sat 39%, CO3.36, CI 2.37, SVR 1825. PVR 4.17. In ED placed on Bipap and given 80mg lasix IVF. Denies abdominal pain, n/v, cough fever, chills, CCU consult called for mgt ADHF    Recent 72 y/o female with pmhx of HTN, DM, CAD s/p CABG, HFrEF (LVEF 25%) severe MR, severe pulm HTN presents from [facility] with shortness of breath, increased wob and fatigue starting earlier today.  Also complaining of chest pain described as tightness/pressure associated with nausea.  Recently admitted for CHF exacerbation in setting of pneumonia to CCU on Bumex gtt and Milrinone   [facility] 5/9/19:  triple vessel disease - treat medically, RHC showed RA 14, V 71/17, PA 69/20/41, PCWP 27, PA sat 39%, CO3.36, CI 2.37, SVR 1825. PVR 4.17. In ED placed on Bipap and given 80mg lasix IVF. pbnp >4000, creatinine, 2.13. Denies abdominal pain, n/v, cough fever, chills, CCU consult called for mgt ADHF

## 2019-06-01 NOTE — H&P ADULT - PROBLEM SELECTOR PLAN 4
DM2 (diabetes mellitus, type II)  Monitor blood glucose ACHS with ISS  Check HgA1c  Consistent carbohydrate diet  Continue statin therapy

## 2019-06-01 NOTE — ED ADULT TRIAGE NOTE - PRO INTERPRETER NEED 2
PROCEDURE  ECG 12 Lead Documentation  Date/Time: 10/19/2017 12:11 PM  Performed by: Tennis Cruel  Authorized by: Agatha Lopez     Comments:      Sinus tachycardia with PACs low-voltage QRS inferior infarct age indeterminate  Nonspecific changes  No can change from old ECG 
English

## 2019-06-01 NOTE — H&P ADULT - PROBLEM SELECTOR PLAN 1
CHF (congestive heart failure), acute on chronic  H/O HFrEF (LVEF 25%) severe MR, severe pulm HTN with plan to evaluate for clip as outpt.  Recent RHC 5/9/19 showed RA 14, V 71/17, PA 69/20/41, PCWP 27, PA sat 39%, CO3.36, CI 2.37, SVR 1825. PVR 4.17.   Has used Bipap in the past. Does not use home CPAP.  H/O intubation last admission.    Pt. w/ bilalateral rales, SOB   CXR showing pulmonary edema  pBNP >4000  Admit toCCU  Admission labs to include CMP, Mag, phos, CBC, coags, A1c, pBNP, TSH, -CXR,   Heart failure core measures  TTE for LV function and WMA  Strict I/Os and standing daily weights  Bumex bolus 4mg IVP and initiate Bumex gtt @1  mg/ hr.  Continue Lipitor to 40 mg QHS  Trend BMP 2/2 diuresis and replete as needed, k>4, mg> 2  Metolazone 2.5 x 1 now  Bipap 15/5 50%   hold BB at this time in the setting ADHF.

## 2019-06-01 NOTE — H&P ADULT - PROBLEM SELECTOR PLAN 5
Problem: CAD (coronary artery disease).    H/O CAD s/p CABG and [facility] 5/9/19:  triple vessel disease - being treated medically  Plan: continue ASA, Brilinta and Lipitor,  Hold beta blocker in setting of ADHF  Check lipid panel  low fat, DASH diet  Risk factor modification

## 2019-06-01 NOTE — ED PROVIDER NOTE - OBJECTIVE STATEMENT
Patient does not want to aggressively pursue work up for syncope. She will call the office if symptoms return.    72 y/o female with pmhx of HTN, DM, CAD s/p CABG, HFrEF (LVEF 25%) severe MR, severe pulm HTN presents with respiratory distress.  Severe shortness of breath, increased wob and fatigue starting earlier today.  Also complaining of chest pain described as tightness/pressure.  A/w nausea.  Was recently admitted for CHF exacerbation in setting of pneumonia to CCU.   5/9/19 RHC showed RA 14, V 71/17, PA 69/20/41, PCWP 27, PA sat 39%, CO3.36, CI 2.37, SVR 1825. PVR 4.17. Denies abdominal pain, diarrhea, cough, dysuria.

## 2019-06-01 NOTE — CONSULT NOTE ADULT - ASSESSMENT
74 y/o female with PMHx of HTN, DM, CAD s/p CABG (LIMA to LAD, SVG to OM, SVG to PDA at [facility] 2014), HFrEF (LVEF 25%) severe MR, severe pulm HTN, hypothyroidism, [facility]  MERVIN  Likely sec to CHF/renal vasocongestion   Scr 2.1 on admission   Continue diuretics per cardiology - on bumex drip  IF renal function continues to worsen- consider holding spirinolactone until renal fucntion stabilizes   Monitor bmp  Avoid nephrotoxic agents      CKD stage 3  baseline cr 1.5-1.8  likely sec to HTN/DM/chf  MOnitor renal function     SOB  likely sec to HF   Monitor daily weight and i/o  Diuretics per cardio 72 y/o female with PMHx of HTN, DM, CAD s/p CABG (LIMA to LAD, SVG to OM, SVG to PDA at [facility] 2014), HFrEF (LVEF 25%) severe MR, severe pulm HTN, hypothyroidism, [facility]  MERVIN  Likely sec to CHF/renal vasocongestion   Scr 2.1 on admission   Continue diuretics per cardiology - on bumex drip  IF renal function continues to worsen- consider holding spirinolactone until renal fucntion stabilizes   Monitor bmp  Avoid nephrotoxic agents      CKD stage 3  baseline cr 1.5-1.8  likely sec to HTN/DM/chf  MOnitor renal function     SOB  likely sec to HF   Monitor daily weight and i/o  Diuretics per cardio      Acidosis   MOnitor serum Co2  Start sodium bicarb 650mg TID once volume status improves

## 2019-06-01 NOTE — CHART NOTE - NSCHARTNOTEFT_GEN_A_CORE
CCU Accept Note      HPI/HOSPITAL COURSE:   74 y/o female with PMHx of HTN, DM, CAD s/p CABG (LIMA to LAD, SVG to OM, SVG to PDA at [facility] 2014), HFrEF (LVEF 25%) severe MR, severe pulm HTN, hypothyroidism, presents from [facility] with shortness of breath, increased wob and fatigue starting earlier today.  Also complaining of chest pain described as tightness/pressure associated with nausea.  Recently admitted for CHF exacerbation in setting of pneumonia to CCU on Bumex gtt and Milrinone     [facility] 5/9/19:  triple vessel disease - treat medically, RHC showed RA 14, V 71/17, PA 69/20/41, PCWP 27, PA sat 39%, CO3.36, CI 2.37, SVR 1825. PVR 4.17.   In ED placed on Bipap and given 80mg lasix IVF. pbnp >4000, creatinine, 2.13. Denies abdominal pain, n/v, cough fever, chills, CCU consult called for mgt ADHF    Currently pt denies chest pain, abd pain, n/v, fever, chills. Only complains of SOB and dysuria.       REVIEW OF SYSTEMS:     MEDICATIONS  (STANDING):  aspirin  chewable 81 milliGRAM(s) Oral daily  atorvastatin 40 milliGRAM(s) Oral at bedtime  buMETAnide Infusion 1 mG/Hr (5 mL/Hr) IV Continuous <Continuous>  buMETAnide Injectable 2 milliGRAM(s) IV Push once  chlorhexidine 4% Liquid 1 Application(s) Topical <User Schedule>  dextrose 5%. 1000 milliLiter(s) (50 mL/Hr) IV Continuous <Continuous>  dextrose 50% Injectable 12.5 Gram(s) IV Push once  dextrose 50% Injectable 25 Gram(s) IV Push once  dextrose 50% Injectable 25 Gram(s) IV Push once  docusate sodium 100 milliGRAM(s) Oral two times a day  heparin  Injectable 5000 Unit(s) SubCutaneous every 12 hours  hydrALAZINE 20 milliGRAM(s) Oral three times a day  insulin glargine Injectable (LANTUS) 30 Unit(s) SubCutaneous at bedtime  insulin lispro (HumaLOG) corrective regimen sliding scale   SubCutaneous three times a day before meals  insulin lispro (HumaLOG) corrective regimen sliding scale   SubCutaneous at bedtime  isosorbide   dinitrate Tablet (ISORDIL) 10 milliGRAM(s) Oral three times a day  levothyroxine 50 MICROGram(s) Oral daily  metolazone 2.5 milliGRAM(s) Oral once  montelukast 10 milliGRAM(s) Oral daily  pantoprazole    Tablet 40 milliGRAM(s) Oral before breakfast  senna 2 Tablet(s) Oral at bedtime  spironolactone 25 milliGRAM(s) Oral daily  ticagrelor 90 milliGRAM(s) Oral two times a day    MEDICATIONS  (PRN):  dextrose 40% Gel 15 Gram(s) Oral once PRN Blood Glucose LESS THAN 70 milliGRAM(s)/deciliter  glucagon  Injectable 1 milliGRAM(s) IntraMuscular once PRN Glucose LESS THAN 70 milligrams/deciliter      Allergies  azithromycin (Hives; Pruritus)      Vital Signs Last 24 Hrs  T(C): 37.2 (01 Jun 2019 06:33), Max: 37.7 (01 Jun 2019 03:27)  T(F): 99 (01 Jun 2019 06:33), Max: 99.8 (01 Jun 2019 03:27)  HR: 84 (01 Jun 2019 08:00) (84 - 104)  BP: 98/51 (01 Jun 2019 08:00) (98/51 - 128/56)  BP(mean): 63 (01 Jun 2019 08:00) (63 - 75)  RR: 22 (01 Jun 2019 08:00) (16 - 47)  SpO2: 98% (01 Jun 2019 08:00) (97% - 100%)    I&O's Summary  pt arrived with diaper, unable to quantify UOP     Physical Exam:  General: In mild resp distress, but calm and more comfortable on BiPAP  HEENT: PERRL, EOMI  Neck: Supple. (+) JVD  Chest/Lungs: Coarse breath sounds and crackles bilaterally  Heart: RRR, unable to appreciate murmur over BiPAP sounds  Abdomen: Soft, obese but nondistended, nontender to palpation  Extremities: No lower extremity edema  Skin: Warm, well-perfused, no rashes or lesions  Neurological: Grossly nonfocal, MAEx4    LABS:                         8.4    14.46 )-----------( 342      ( 01 Jun 2019 08:00 )             28.0     06-01    139  |  104  |  57<H>  ----------------------------<  281<H>  4.0   |  16<L>  |  2.13<H>    Ca    8.6      01 Jun 2019 03:05    TPro  7.9  /  Alb  3.7  /  TBili  0.3  /  DBili  x   /  AST  30  /  ALT  23  /  AlkPhos  116  06-01    LIVER FUNCTIONS - ( 01 Jun 2019 03:05 )  Alb: 3.7 g/dL / Pro: 7.9 g/dL / ALK PHOS: 116 u/L / ALT: 23 u/L / AST: 30 u/L / GGT: x           PT/INR - ( 01 Jun 2019 03:23 )   PT: 12.8 SEC;   INR: 1.12          PTT - ( 01 Jun 2019 03:23 )  PTT:38.1 SEC  ABG - ( 01 Jun 2019 03:58 )  pH, Arterial: 7.39  pH, Blood: x     /  pCO2: 28    /  pO2: 148   / HCO3: 19    / Base Excess: -7.3  /  SaO2: 99.0          ECG: reviewed    Echocardiogram:     Patient name: SELVIN CLAIRE  YOB: 1945   Age: 73 (F)   MR#: [id number]  Study Date: 5/1/2019  [facility] LV OnlySonographer: Kory Gagnon M.D.  Study quality: Technically good  Referring Physician: Rafael Velarde MD  Blood Pressure: 110/57g mmHg  Height: 137 cm  Weight: 57 kg  BSA: 1.4 m2  ------------------------------------------------------------------------  PROCEDURE: Transesophageal (LV) was performed in the  echocardiography laboratory.  Informed consent was first  obtained for LV.   The patient was sedated by the  anesthesiologist service(reported separately).   The  procedure was monitored with automatic blood pressure  monitoring, ECG tracings and pulse oximetry.  Gag reflex  was abolished with topical Cetacaine and the  transesophageal probe was placed in the esophagus posterior  to the heart without complications.  The patient tolerated  the procedure well.   Patient was injected with 10 cc's of  aerosolized saline.  Patient was injectedwith 10 cc's of aerosolized saline.  INDICATION: Nonrheumatic mitral (valve) insufficiency  (I34.0)  ------------------------------------------------------------------------  OBSERVATIONS:  Mitral Valve: Mitral annular calcification and calcified  mitral leaflets which are tethered. The posterior mitral  leaflet is particularly tethered.  Severe mitral  regurgitation which originates along the coaptation line.  Aortic Root: Normal aortic root, aortic arch and descending  thoracic aorta.  Aortic Valve: Calcified trileaflet aortic valve with normal  opening.  Left Atrium: Left atrial enlargement. Left atrial appendage  could not be visualized, unclear if it was ligated during  prior CABG.  Left Ventricle: Severe  left ventricular systolic  dysfunction. Left ventricular enlargement.  Right Heart: Normal right atrium. Right ventricular  enlargement with decreased right ventricular systolic  function. Normal tricuspid valve. Normal pulmonic valve.  Pericardium/PleuraNormal pericardium with no pericardial  effusion.  Hemodynamic: Agitated saline injection demonstrates  evidence of a patent foramen ovale with left to right flow.  ------------------------------------------------------------------------  CONCLUSIONS:  1. Mitral annular calcification and calcified mitral  leaflets which are tethered. The posterior mitral leaflet  is particularly tethered.  Severe mitral regurgitation  which originates along the coaptation line.  2. Calcified trileaflet aortic valve with normal opening.  3. Left atrial enlargement. Left atrial appendage could not  be visualized, unclear if it was ligated during prior CABG.    4. Left ventricular enlargement.  5. Severe  left ventricular systolic dysfunction.  6. Right ventricular enlargement with decreased right  ventricular systolic function.  7. Agitated saline injection demonstrates evidence of a  patent foramen ovale with left to right flow.  ADDENDUM 5/3/2019: Systolic flow reversal seen in the right  upper pulmonary vein. Technically very difficult study.      Radiology:    EXAM:  XR CHEST PORTABLE URGENT 1V        PROCEDURE DATE:  Jun 1 2019         INTERPRETATION:  CLINICAL INFORMATION: Shortness of breath    EXAM: AP view of chest.    COMPARISON: Chest radiograph from 5/9/2019    FINDINGS:    Status post sternotomy.  Cardiac size cannot be accurately assessed on this AP projection.   Bilateral perihilar haze consistent with pulmonary edema.    IMPRESSION: Pulmonary edema.        ASSESSMENT & PLAN:   74 y/o female with PMHx of HTN, DM, CAD s/p CABG (LIMA to LAD, SVG to OM, SVG to PDA at [facility] 2014), HFrEF (LVEF 25%) severe MR, severe pulm HTN, hypothyroidism, presents from [facility] with shortness of breath, increased wob and fatigue. Admitted to CCU for ADHF exacerbation.    1. Acute decompensated heart failure      Insulin-dependent type 2 diabetes mellitus  Hypothyroidism CCU Accept Note      HPI/HOSPITAL COURSE:   72 y/o female with PMHx of HTN, DM, CAD s/p CABG (LIMA to LAD, SVG to OM, SVG to PDA at [facility] 2014), HFrEF (LVEF 25%) severe MR, severe pulm HTN, hypothyroidism, presents from [facility] with shortness of breath, increased wob and fatigue starting earlier today.  Also complaining of chest pain described as tightness/pressure associated with nausea.  Recently admitted for CHF exacerbation in setting of pneumonia to CCU on Bumex gtt and Milrinone     [facility] 5/9/19:  triple vessel disease - treat medically, RHC showed RA 14, V 71/17, PA 69/20/41, PCWP 27, PA sat 39%, CO3.36, CI 2.37, SVR 1825. PVR 4.17.   In ED placed on Bipap and given 80mg lasix IVF. pbnp >4000, creatinine, 2.13. Denies abdominal pain, n/v, cough fever, chills, CCU consult called for mgt ADHF    Currently pt denies chest pain, abd pain, n/v, fever, chills. Only complains of SOB and dysuria.       REVIEW OF SYSTEMS:     MEDICATIONS  (STANDING):  aspirin  chewable 81 milliGRAM(s) Oral daily  atorvastatin 40 milliGRAM(s) Oral at bedtime  buMETAnide Infusion 1 mG/Hr (5 mL/Hr) IV Continuous <Continuous>  buMETAnide Injectable 2 milliGRAM(s) IV Push once  chlorhexidine 4% Liquid 1 Application(s) Topical <User Schedule>  dextrose 5%. 1000 milliLiter(s) (50 mL/Hr) IV Continuous <Continuous>  dextrose 50% Injectable 12.5 Gram(s) IV Push once  dextrose 50% Injectable 25 Gram(s) IV Push once  dextrose 50% Injectable 25 Gram(s) IV Push once  docusate sodium 100 milliGRAM(s) Oral two times a day  heparin  Injectable 5000 Unit(s) SubCutaneous every 12 hours  hydrALAZINE 20 milliGRAM(s) Oral three times a day  insulin glargine Injectable (LANTUS) 30 Unit(s) SubCutaneous at bedtime  insulin lispro (HumaLOG) corrective regimen sliding scale   SubCutaneous three times a day before meals  insulin lispro (HumaLOG) corrective regimen sliding scale   SubCutaneous at bedtime  isosorbide   dinitrate Tablet (ISORDIL) 10 milliGRAM(s) Oral three times a day  levothyroxine 50 MICROGram(s) Oral daily  metolazone 2.5 milliGRAM(s) Oral once  montelukast 10 milliGRAM(s) Oral daily  pantoprazole    Tablet 40 milliGRAM(s) Oral before breakfast  senna 2 Tablet(s) Oral at bedtime  spironolactone 25 milliGRAM(s) Oral daily  ticagrelor 90 milliGRAM(s) Oral two times a day    MEDICATIONS  (PRN):  dextrose 40% Gel 15 Gram(s) Oral once PRN Blood Glucose LESS THAN 70 milliGRAM(s)/deciliter  glucagon  Injectable 1 milliGRAM(s) IntraMuscular once PRN Glucose LESS THAN 70 milligrams/deciliter      Allergies  azithromycin (Hives; Pruritus)      Vital Signs Last 24 Hrs  T(C): 37.2 (01 Jun 2019 06:33), Max: 37.7 (01 Jun 2019 03:27)  T(F): 99 (01 Jun 2019 06:33), Max: 99.8 (01 Jun 2019 03:27)  HR: 84 (01 Jun 2019 08:00) (84 - 104)  BP: 98/51 (01 Jun 2019 08:00) (98/51 - 128/56)  BP(mean): 63 (01 Jun 2019 08:00) (63 - 75)  RR: 22 (01 Jun 2019 08:00) (16 - 47)  SpO2: 98% (01 Jun 2019 08:00) (97% - 100%)    I&O's Summary  Oscar placed ~7am, patient put out >1.5L in 4 hours    Physical Exam:  General: In mild resp distress, but calm and more comfortable on BiPAP  HEENT: PERRL, EOMI  Neck: Supple. (+) JVD  Chest/Lungs: Coarse breath sounds and crackles bilaterally  Heart: RRR, unable to appreciate murmur over BiPAP sounds  Abdomen: Soft, obese but nondistended, nontender to palpation  Extremities: No lower extremity edema  Skin: Warm, well-perfused, no rashes or lesions  Neurological: Grossly nonfocal, MAEx4    LABS:                         8.4    14.46 )-----------( 342      ( 01 Jun 2019 08:00 )             28.0     06-01    139  |  104  |  57<H>  ----------------------------<  281<H>  4.0   |  16<L>  |  2.13<H>    Ca    8.6      01 Jun 2019 03:05    TPro  7.9  /  Alb  3.7  /  TBili  0.3  /  DBili  x   /  AST  30  /  ALT  23  /  AlkPhos  116  06-01    LIVER FUNCTIONS - ( 01 Jun 2019 03:05 )  Alb: 3.7 g/dL / Pro: 7.9 g/dL / ALK PHOS: 116 u/L / ALT: 23 u/L / AST: 30 u/L / GGT: x           PT/INR - ( 01 Jun 2019 03:23 )   PT: 12.8 SEC;   INR: 1.12          PTT - ( 01 Jun 2019 03:23 )  PTT:38.1 SEC  ABG - ( 01 Jun 2019 03:58 )  pH, Arterial: 7.39  pH, Blood: x     /  pCO2: 28    /  pO2: 148   / HCO3: 19    / Base Excess: -7.3  /  SaO2: 99.0          ECG: reviewed    Echocardiogram:     Patient name: SELVIN CLAIRE  YOB: 1945   Age: 73 (F)   MR#: [id number]  Study Date: 5/1/2019  Location: [facility] LV OnlySonographer: Kory Gagnon M.D.  Study quality: Technically good  Referring Physician: Rafael Velarde MD  Blood Pressure: 110/57g mmHg  Height: 137 cm  Weight: 57 kg  BSA: 1.4 m2  ------------------------------------------------------------------------  PROCEDURE: Transesophageal (LV) was performed in the  echocardiography laboratory.  Informed consent was first  obtained for LV.   The patient was sedated by the  anesthesiologist service(reported separately).   The  procedure was monitored with automatic blood pressure  monitoring, ECG tracings and pulse oximetry.  Gag reflex  was abolished with topical Cetacaine and the  transesophageal probe was placed in the esophagus posterior  to the heart without complications.  The patient tolerated  the procedure well.   Patient was injected with 10 cc's of  aerosolized saline.  Patient was injectedwith 10 cc's of aerosolized saline.  INDICATION: Nonrheumatic mitral (valve) insufficiency  (I34.0)  ------------------------------------------------------------------------  OBSERVATIONS:  Mitral Valve: Mitral annular calcification and calcified  mitral leaflets which are tethered. The posterior mitral  leaflet is particularly tethered.  Severe mitral  regurgitation which originates along the coaptation line.  Aortic Root: Normal aortic root, aortic arch and descending  thoracic aorta.  Aortic Valve: Calcified trileaflet aortic valve with normal  opening.  Left Atrium: Left atrial enlargement. Left atrial appendage  could not be visualized, unclear if it was ligated during  prior CABG.  Left Ventricle: Severe  left ventricular systolic  dysfunction. Left ventricular enlargement.  Right Heart: Normal right atrium. Right ventricular  enlargement with decreased right ventricular systolic  function. Normal tricuspid valve. Normal pulmonic valve.  Pericardium/PleuraNormal pericardium with no pericardial  effusion.  Hemodynamic: Agitated saline injection demonstrates  evidence of a patent foramen ovale with left to right flow.  ------------------------------------------------------------------------  CONCLUSIONS:  1. Mitral annular calcification and calcified mitral  leaflets which are tethered. The posterior mitral leaflet  is particularly tethered.  Severe mitral regurgitation  which originates along the coaptation line.  2. Calcified trileaflet aortic valve with normal opening.  3. Left atrial enlargement. Left atrial appendage could not  be visualized, unclear if it was ligated during prior CABG.    4. Left ventricular enlargement.  5. Severe  left ventricular systolic dysfunction.  6. Right ventricular enlargement with decreased right  ventricular systolic function.  7. Agitated saline injection demonstrates evidence of a  patent foramen ovale with left to right flow.  ADDENDUM 5/3/2019: Systolic flow reversal seen in the right  upper pulmonary vein. Technically very difficult study.      Radiology:    EXAM:  XR CHEST PORTABLE URGENT 1V        PROCEDURE DATE:  Jun 1 2019         INTERPRETATION:  CLINICAL INFORMATION: Shortness of breath    EXAM: AP view of chest.    COMPARISON: Chest radiograph from 5/9/2019    FINDINGS:    Status post sternotomy.  Cardiac size cannot be accurately assessed on this AP projection.   Bilateral perihilar haze consistent with pulmonary edema.    IMPRESSION: Pulmonary edema.        ASSESSMENT & PLAN:   72 y/o female with PMHx of HTN, DM, CAD s/p CABG (LIMA to LAD, SVG to OM, SVG to PDA at [facility] 2014), HFrEF (LVEF 25%) severe MR, severe pulm HTN, hypothyroidism, presents from [facility] with shortness of breath, increased wob and fatigue. Admitted to CCU for ADHF exacerbation.    1. Acute decompensated heart failure  H/O HFrEF (LVEF 25%) severe MR, severe pulm HTN with plan to evaluate for clip as outpt.  Recent RHC 5/9/19 showed RA 14, V 71/17, PA 69/20/41, PCWP 27, PA sat 39%, CO3.36, CI 2.37, SVR 1825. PVR 4.17.   Has used Bipap in the past. Does not use home CPAP.  H/O intubation last admission.    Pt. w/ bilalateral rales, SOB   CXR showing pulmonary edema  pBNP >4000  - f/u TTE for LV function and WMA  - Strict I/Os and standing daily weights  - C/w Bumex gtt @1  mg/ hr.  - Continue Lipitor to 40 mg QHS  - Trend BMP BID 2/2 diuresis and replete as needed, k>4, mg> 2  - Metolazone 2.5 x 1 now  - Bipap 15/5 50%   - hold BB at this time in the setting ADHF.     2. Hypothyroidism  - Continue levothyroxine 50mcg  - Check TSH.     3. Hyperlipidemia  - f/u lipid panel   - Continue statin therapy with atorvastatin 40 mg qhs  - Low fat diet.     4. Insulin dependent type 2 diabetes  - f/u HgA1c  - C/w Lantus, Humalog, ISS  - Consistent carbohydrate diet  - Continue statin therapy.     5. CAD (coronary artery disease).    H/O CAD s/p CABG and LHC 5/9/19:  triple vessel disease - being treated medically  Plan: continue ASA, Brilinta and Lipitor,  Hold beta blocker in setting of ADHF  Check lipid panel  low fat, DASH diet  Risk factor modification.     Prophylaxis   Problem: VTE (venous thromboembolism). Plan: Heparin SQ. CCU Accept Note      HPI/HOSPITAL COURSE:   74 y/o female with PMHx of HTN, DM, CAD s/p CABG (LIMA to LAD, SVG to OM, SVG to PDA at [facility] 2014), HFrEF (LVEF 25%) severe MR, severe pulm HTN, hypothyroidism, presents from [facility] with shortness of breath, increased wob and fatigue starting earlier today.  Also complaining of chest pain described as tightness/pressure associated with nausea.  Recently admitted for CHF exacerbation in setting of pneumonia to CCU on Bumex gtt and Milrinone     [facility] 5/9/19:  triple vessel disease - treat medically, RHC showed RA 14, V 71/17, PA 69/20/41, PCWP 27, PA sat 39%, CO3.36, CI 2.37, SVR 1825. PVR 4.17.   In ED placed on Bipap and given 80mg lasix IVF. pbnp >4000, creatinine, 2.13. Denies abdominal pain, n/v, cough fever, chills, CCU consult called for mgt ADHF    Currently pt denies chest pain, abd pain, n/v, fever, chills. Only complains of SOB and dysuria.       REVIEW OF SYSTEMS:     MEDICATIONS  (STANDING):  aspirin  chewable 81 milliGRAM(s) Oral daily  atorvastatin 40 milliGRAM(s) Oral at bedtime  buMETAnide Infusion 1 mG/Hr (5 mL/Hr) IV Continuous <Continuous>  buMETAnide Injectable 2 milliGRAM(s) IV Push once  chlorhexidine 4% Liquid 1 Application(s) Topical <User Schedule>  dextrose 5%. 1000 milliLiter(s) (50 mL/Hr) IV Continuous <Continuous>  dextrose 50% Injectable 12.5 Gram(s) IV Push once  dextrose 50% Injectable 25 Gram(s) IV Push once  dextrose 50% Injectable 25 Gram(s) IV Push once  docusate sodium 100 milliGRAM(s) Oral two times a day  heparin  Injectable 5000 Unit(s) SubCutaneous every 12 hours  hydrALAZINE 20 milliGRAM(s) Oral three times a day  insulin glargine Injectable (LANTUS) 30 Unit(s) SubCutaneous at bedtime  insulin lispro (HumaLOG) corrective regimen sliding scale   SubCutaneous three times a day before meals  insulin lispro (HumaLOG) corrective regimen sliding scale   SubCutaneous at bedtime  isosorbide   dinitrate Tablet (ISORDIL) 10 milliGRAM(s) Oral three times a day  levothyroxine 50 MICROGram(s) Oral daily  metolazone 2.5 milliGRAM(s) Oral once  montelukast 10 milliGRAM(s) Oral daily  pantoprazole    Tablet 40 milliGRAM(s) Oral before breakfast  senna 2 Tablet(s) Oral at bedtime  spironolactone 25 milliGRAM(s) Oral daily  ticagrelor 90 milliGRAM(s) Oral two times a day    MEDICATIONS  (PRN):  dextrose 40% Gel 15 Gram(s) Oral once PRN Blood Glucose LESS THAN 70 milliGRAM(s)/deciliter  glucagon  Injectable 1 milliGRAM(s) IntraMuscular once PRN Glucose LESS THAN 70 milligrams/deciliter      Allergies  azithromycin (Hives; Pruritus)      Vital Signs Last 24 Hrs  T(C): 37.2 (01 Jun 2019 06:33), Max: 37.7 (01 Jun 2019 03:27)  T(F): 99 (01 Jun 2019 06:33), Max: 99.8 (01 Jun 2019 03:27)  HR: 84 (01 Jun 2019 08:00) (84 - 104)  BP: 98/51 (01 Jun 2019 08:00) (98/51 - 128/56)  BP(mean): 63 (01 Jun 2019 08:00) (63 - 75)  RR: 22 (01 Jun 2019 08:00) (16 - 47)  SpO2: 98% (01 Jun 2019 08:00) (97% - 100%)    I&O's Summary  Oscar placed ~7am, patient put out >1.5L in 4 hours    Physical Exam:  General: In mild resp distress, but calm and more comfortable on BiPAP  HEENT: PERRL, EOMI  Neck: Supple. (+) JVD  Chest/Lungs: Coarse breath sounds and crackles bilaterally  Heart: RRR, unable to appreciate murmur over BiPAP sounds  Abdomen: Soft, obese but nondistended, nontender to palpation  Extremities: No lower extremity edema  Skin: Warm, well-perfused, no rashes or lesions  Neurological: Grossly nonfocal, MAEx4    LABS:                         8.4    14.46 )-----------( 342      ( 01 Jun 2019 08:00 )             28.0     06-01    139  |  104  |  57<H>  ----------------------------<  281<H>  4.0   |  16<L>  |  2.13<H>    Ca    8.6      01 Jun 2019 03:05    TPro  7.9  /  Alb  3.7  /  TBili  0.3  /  DBili  x   /  AST  30  /  ALT  23  /  AlkPhos  116  06-01    LIVER FUNCTIONS - ( 01 Jun 2019 03:05 )  Alb: 3.7 g/dL / Pro: 7.9 g/dL / ALK PHOS: 116 u/L / ALT: 23 u/L / AST: 30 u/L / GGT: x           PT/INR - ( 01 Jun 2019 03:23 )   PT: 12.8 SEC;   INR: 1.12          PTT - ( 01 Jun 2019 03:23 )  PTT:38.1 SEC  ABG - ( 01 Jun 2019 03:58 )  pH, Arterial: 7.39  pH, Blood: x     /  pCO2: 28    /  pO2: 148   / HCO3: 19    / Base Excess: -7.3  /  SaO2: 99.0          ECG: reviewed    Echocardiogram:     Patient name: SELVIN CLAIRE  YOB: 1945   Age: 73 (F)   MR#: [id number]  Study Date: 5/1/2019  Location: [facility] LV OnlySonographer: Kory Gagnon M.D.  Study quality: Technically good  Referring Physician: Rafael Velarde MD  Blood Pressure: 110/57g mmHg  Height: 137 cm  Weight: 57 kg  BSA: 1.4 m2  ------------------------------------------------------------------------  PROCEDURE: Transesophageal (LV) was performed in the  echocardiography laboratory.  Informed consent was first  obtained for LV.   The patient was sedated by the  anesthesiologist service(reported separately).   The  procedure was monitored with automatic blood pressure  monitoring, ECG tracings and pulse oximetry.  Gag reflex  was abolished with topical Cetacaine and the  transesophageal probe was placed in the esophagus posterior  to the heart without complications.  The patient tolerated  the procedure well.   Patient was injected with 10 cc's of  aerosolized saline.  Patient was injectedwith 10 cc's of aerosolized saline.  INDICATION: Nonrheumatic mitral (valve) insufficiency  (I34.0)  ------------------------------------------------------------------------  OBSERVATIONS:  Mitral Valve: Mitral annular calcification and calcified  mitral leaflets which are tethered. The posterior mitral  leaflet is particularly tethered.  Severe mitral  regurgitation which originates along the coaptation line.  Aortic Root: Normal aortic root, aortic arch and descending  thoracic aorta.  Aortic Valve: Calcified trileaflet aortic valve with normal  opening.  Left Atrium: Left atrial enlargement. Left atrial appendage  could not be visualized, unclear if it was ligated during  prior CABG.  Left Ventricle: Severe  left ventricular systolic  dysfunction. Left ventricular enlargement.  Right Heart: Normal right atrium. Right ventricular  enlargement with decreased right ventricular systolic  function. Normal tricuspid valve. Normal pulmonic valve.  Pericardium/PleuraNormal pericardium with no pericardial  effusion.  Hemodynamic: Agitated saline injection demonstrates  evidence of a patent foramen ovale with left to right flow.  ------------------------------------------------------------------------  CONCLUSIONS:  1. Mitral annular calcification and calcified mitral  leaflets which are tethered. The posterior mitral leaflet  is particularly tethered.  Severe mitral regurgitation  which originates along the coaptation line.  2. Calcified trileaflet aortic valve with normal opening.  3. Left atrial enlargement. Left atrial appendage could not  be visualized, unclear if it was ligated during prior CABG.    4. Left ventricular enlargement.  5. Severe  left ventricular systolic dysfunction.  6. Right ventricular enlargement with decreased right  ventricular systolic function.  7. Agitated saline injection demonstrates evidence of a  patent foramen ovale with left to right flow.  ADDENDUM 5/3/2019: Systolic flow reversal seen in the right  upper pulmonary vein. Technically very difficult study.      Radiology:    EXAM:  XR CHEST PORTABLE URGENT 1V        PROCEDURE DATE:  Jun 1 2019         INTERPRETATION:  CLINICAL INFORMATION: Shortness of breath    EXAM: AP view of chest.    COMPARISON: Chest radiograph from 5/9/2019    FINDINGS:    Status post sternotomy.  Cardiac size cannot be accurately assessed on this AP projection.   Bilateral perihilar haze consistent with pulmonary edema.    IMPRESSION: Pulmonary edema.        ASSESSMENT & PLAN:   74 y/o female with PMHx of HTN, DM, CAD s/p CABG (LIMA to LAD, SVG to OM, SVG to PDA at [facility] 2014), HFrEF (LVEF 25%) severe MR, severe pulm HTN, hypothyroidism, presents from [facility] with shortness of breath, increased wob and fatigue. Admitted to CCU for ADHF exacerbation.    1. Acute decompensated heart failure  H/O HFrEF (LVEF 25%) severe MR, severe pulm HTN with plan to evaluate for clip as outpt.  Recent RHC 5/9/19 showed RA 14, V 71/17, PA 69/20/41, PCWP 27, PA sat 39%, CO3.36, CI 2.37, SVR 1825. PVR 4.17.   Has used Bipap in the past. Does not use home CPAP.  H/O intubation last admission.    Pt. w/ bilalateral rales, SOB   CXR showing pulmonary edema  pBNP >4000  - f/u TTE for LV function and WMA  - Strict I/Os and standing daily weights  - C/w Bumex gtt @1  mg/ hr.  - Continue Lipitor to 40 mg QHS  - Trend BMP BID 2/2 diuresis and replete as needed, k>4, mg> 2  - Metolazone 2.5 x 1 now  - Bipap 15/5 50%   - hold BB at this time in the setting ADHF  - r/o underlying infection. f/u UA, RVP, blood cx, repeat CXR in am    2. Hypothyroidism  - Continue levothyroxine 50mcg  - Check TSH.     3. Hyperlipidemia  - f/u lipid panel   - Continue statin therapy with atorvastatin 40 mg qhs  - Low fat diet.     4. Insulin dependent type 2 diabetes  - f/u HgA1c  - C/w Lantus, Humalog, ISS  - Consistent carbohydrate diet  - Continue statin therapy.     5. CAD (coronary artery disease).    H/O CAD s/p CABG and LHC 5/9/19:  triple vessel disease - being treated medically  Plan: continue ASA, Brilinta and Lipitor,  Hold beta blocker in setting of ADHF  Check lipid panel  low fat, DASH diet  Risk factor modification.     Prophylaxis   Problem: VTE (venous thromboembolism). Plan: Heparin SQ.

## 2019-06-01 NOTE — DISCHARGE NOTE PROVIDER - HOSPITAL COURSE
74 y/o female with PMHx of HTN, DM, CAD s/p CABG (LIMA to LAD, SVG to OM, SVG to PDA at [facility] 2014), HFrEF (LVEF 25%) severe MR, severe pulm HTN, hypothyroidism, presents from [facility] with shortness of breath, increased wob and fatigue starting earlier today.  Also complaining of chest pain described as tightness/pressure associated with nausea.  Of note, patient was recently admitted for CHF exacerbation in setting of pneumonia to CCU on Bumex gtt and Milrinone. During that admission, LHC 5/9/19 showed triple vessel disease - treat medically, RHC showed RA 14, V 71/17, PA 69/20/41, PCWP 27, PA sat 39%, CO3.36, CI 2.37, SVR 1825. PVR 4.17.         In ED, given 1 dose of Ceftriaxone, placed on Bipap and given 80mg lasix IVF. pBNP >4000, sCr 2.13. Admitted to CCU for management of ADHF.        In the CCU, patient placed on Bumex gtt+ and Milrinone. Continued BiPAP. Repeat echo notable for severe MR. Patient transferred to [facility] for evaluation for possible mitral valve clip.

## 2019-06-01 NOTE — H&P ADULT - PROBLEM SELECTOR PLAN 3
Patient has h/o hyperlipidemia and is on atorvastatin 40 mg qhs  Lipid panel in am  Continue statin therapy with atorvastatin 40 mg qhs  Low fat diet

## 2019-06-01 NOTE — H&P ADULT - ASSESSMENT
72 y/o female with pmhx of HTN, DM, CAD s/p CABG, HFrEF (LVEF 25%) severe MR, severe pulm HTN presents from [facility] with shortness of breath, increased wob and fatigue. Now in ADHF, admit to CCU for management    Recently admitted for CHF exacerbation in setting of pneumonia to CCU on bumex gtt and Milrinone   LHC 5/9/19:  triple vessel disease - treat medically, RHC showed RA 14, V 71/17, PA 69/20/41, PCWP 27, PA sat 39%, CO3.36, CI 2.37, SVR 1825. PVR 4.17. 74 y/o female with pmhx of HTN, DM, CAD s/p CABG, HFrEF (LVEF 25%) severe MR, severe pulm HTN presents from [facility] with shortness of breath, increased wob and fatigue. Admit to CCU for management of ADHF

## 2019-06-02 LAB
ALBUMIN SERPL ELPH-MCNC: 3.8 G/DL — SIGNIFICANT CHANGE UP (ref 3.3–5)
ALP SERPL-CCNC: 95 U/L — SIGNIFICANT CHANGE UP (ref 40–120)
ALT FLD-CCNC: 20 U/L — SIGNIFICANT CHANGE UP (ref 4–33)
ANION GAP SERPL CALC-SCNC: 17 MMO/L — HIGH (ref 7–14)
ANION GAP SERPL CALC-SCNC: 18 MMO/L — HIGH (ref 7–14)
AST SERPL-CCNC: 20 U/L — SIGNIFICANT CHANGE UP (ref 4–32)
BASE EXCESS BLDA CALC-SCNC: -1.3 MMOL/L — SIGNIFICANT CHANGE UP
BASOPHILS # BLD AUTO: 0.03 K/UL — SIGNIFICANT CHANGE UP (ref 0–0.2)
BASOPHILS NFR BLD AUTO: 0.2 % — SIGNIFICANT CHANGE UP (ref 0–2)
BILIRUB SERPL-MCNC: 0.2 MG/DL — SIGNIFICANT CHANGE UP (ref 0.2–1.2)
BUN SERPL-MCNC: 60 MG/DL — HIGH (ref 7–23)
BUN SERPL-MCNC: 65 MG/DL — HIGH (ref 7–23)
CALCIUM SERPL-MCNC: 8.8 MG/DL — SIGNIFICANT CHANGE UP (ref 8.4–10.5)
CALCIUM SERPL-MCNC: 9.1 MG/DL — SIGNIFICANT CHANGE UP (ref 8.4–10.5)
CHLORIDE SERPL-SCNC: 103 MMOL/L — SIGNIFICANT CHANGE UP (ref 98–107)
CHLORIDE SERPL-SCNC: 107 MMOL/L — SIGNIFICANT CHANGE UP (ref 98–107)
CK MB BLD-MCNC: 5.23 NG/ML — HIGH (ref 1–4.7)
CK MB BLD-MCNC: SIGNIFICANT CHANGE UP (ref 0–2.5)
CK SERPL-CCNC: 98 U/L — SIGNIFICANT CHANGE UP (ref 25–170)
CO2 SERPL-SCNC: 21 MMOL/L — LOW (ref 22–31)
CO2 SERPL-SCNC: 23 MMOL/L — SIGNIFICANT CHANGE UP (ref 22–31)
CREAT SERPL-MCNC: 1.82 MG/DL — HIGH (ref 0.5–1.3)
CREAT SERPL-MCNC: 1.87 MG/DL — HIGH (ref 0.5–1.3)
EOSINOPHIL # BLD AUTO: 0.14 K/UL — SIGNIFICANT CHANGE UP (ref 0–0.5)
EOSINOPHIL NFR BLD AUTO: 1.1 % — SIGNIFICANT CHANGE UP (ref 0–6)
GLUCOSE BLDA-MCNC: 170 MG/DL — HIGH (ref 70–99)
GLUCOSE BLDC GLUCOMTR-MCNC: 130 MG/DL — HIGH (ref 70–99)
GLUCOSE BLDC GLUCOMTR-MCNC: 174 MG/DL — HIGH (ref 70–99)
GLUCOSE BLDC GLUCOMTR-MCNC: 221 MG/DL — HIGH (ref 70–99)
GLUCOSE BLDC GLUCOMTR-MCNC: 267 MG/DL — HIGH (ref 70–99)
GLUCOSE SERPL-MCNC: 110 MG/DL — HIGH (ref 70–99)
GLUCOSE SERPL-MCNC: 205 MG/DL — HIGH (ref 70–99)
HCO3 BLDA-SCNC: 24 MMOL/L — SIGNIFICANT CHANGE UP (ref 22–26)
HCT VFR BLD CALC: 25.8 % — LOW (ref 34.5–45)
HCT VFR BLDA CALC: 24.9 % — LOW (ref 34.5–46.5)
HGB BLD-MCNC: 7.9 G/DL — LOW (ref 11.5–15.5)
HGB BLDA-MCNC: 8 G/DL — LOW (ref 11.5–15.5)
IMM GRANULOCYTES NFR BLD AUTO: 0.9 % — SIGNIFICANT CHANGE UP (ref 0–1.5)
LACTATE BLDA-SCNC: 1.8 MMOL/L — SIGNIFICANT CHANGE UP (ref 0.5–2)
LYMPHOCYTES # BLD AUTO: 1.47 K/UL — SIGNIFICANT CHANGE UP (ref 1–3.3)
LYMPHOCYTES # BLD AUTO: 11.6 % — LOW (ref 13–44)
MAGNESIUM SERPL-MCNC: 1.8 MG/DL — SIGNIFICANT CHANGE UP (ref 1.6–2.6)
MAGNESIUM SERPL-MCNC: 2 MG/DL — SIGNIFICANT CHANGE UP (ref 1.6–2.6)
MCHC RBC-ENTMCNC: 27.1 PG — SIGNIFICANT CHANGE UP (ref 27–34)
MCHC RBC-ENTMCNC: 30.6 % — LOW (ref 32–36)
MCV RBC AUTO: 88.4 FL — SIGNIFICANT CHANGE UP (ref 80–100)
MONOCYTES # BLD AUTO: 0.74 K/UL — SIGNIFICANT CHANGE UP (ref 0–0.9)
MONOCYTES NFR BLD AUTO: 5.8 % — SIGNIFICANT CHANGE UP (ref 2–14)
NEUTROPHILS # BLD AUTO: 10.16 K/UL — HIGH (ref 1.8–7.4)
NEUTROPHILS NFR BLD AUTO: 80.4 % — HIGH (ref 43–77)
NRBC # FLD: 0 K/UL — SIGNIFICANT CHANGE UP (ref 0–0)
PCO2 BLDA: 24 MMHG — LOW (ref 32–48)
PH BLDA: 7.54 PH — HIGH (ref 7.35–7.45)
PHOSPHATE SERPL-MCNC: 3.2 MG/DL — SIGNIFICANT CHANGE UP (ref 2.5–4.5)
PLATELET # BLD AUTO: 372 K/UL — SIGNIFICANT CHANGE UP (ref 150–400)
PMV BLD: 8.9 FL — SIGNIFICANT CHANGE UP (ref 7–13)
PO2 BLDA: 90 MMHG — SIGNIFICANT CHANGE UP (ref 83–108)
POTASSIUM BLDA-SCNC: 4.6 MMOL/L — HIGH (ref 3.4–4.5)
POTASSIUM SERPL-MCNC: 3.4 MMOL/L — LOW (ref 3.5–5.3)
POTASSIUM SERPL-MCNC: 4.4 MMOL/L — SIGNIFICANT CHANGE UP (ref 3.5–5.3)
POTASSIUM SERPL-SCNC: 3.4 MMOL/L — LOW (ref 3.5–5.3)
POTASSIUM SERPL-SCNC: 4.4 MMOL/L — SIGNIFICANT CHANGE UP (ref 3.5–5.3)
PROT SERPL-MCNC: 8 G/DL — SIGNIFICANT CHANGE UP (ref 6–8.3)
RBC # BLD: 2.92 M/UL — LOW (ref 3.8–5.2)
RBC # FLD: 16.4 % — HIGH (ref 10.3–14.5)
SAO2 % BLDA: 97.7 % — SIGNIFICANT CHANGE UP (ref 95–99)
SODIUM BLDA-SCNC: 142 MMOL/L — SIGNIFICANT CHANGE UP (ref 136–146)
SODIUM SERPL-SCNC: 141 MMOL/L — SIGNIFICANT CHANGE UP (ref 135–145)
SODIUM SERPL-SCNC: 148 MMOL/L — HIGH (ref 135–145)
SPECIMEN SOURCE: SIGNIFICANT CHANGE UP
SPECIMEN SOURCE: SIGNIFICANT CHANGE UP
TROPONIN T, HIGH SENSITIVITY: 256 NG/L — CRITICAL HIGH (ref ?–14)
WBC # BLD: 12.66 K/UL — HIGH (ref 3.8–10.5)
WBC # FLD AUTO: 12.66 K/UL — HIGH (ref 3.8–10.5)

## 2019-06-02 PROCEDURE — 99233 SBSQ HOSP IP/OBS HIGH 50: CPT | Mod: GC

## 2019-06-02 PROCEDURE — 71045 X-RAY EXAM CHEST 1 VIEW: CPT | Mod: 26

## 2019-06-02 RX ORDER — POTASSIUM CHLORIDE 20 MEQ
40 PACKET (EA) ORAL EVERY 4 HOURS
Refills: 0 | Status: COMPLETED | OUTPATIENT
Start: 2019-06-02 | End: 2019-06-02

## 2019-06-02 RX ORDER — LANOLIN ALCOHOL/MO/W.PET/CERES
3 CREAM (GRAM) TOPICAL ONCE
Refills: 0 | Status: COMPLETED | OUTPATIENT
Start: 2019-06-02 | End: 2019-06-03

## 2019-06-02 RX ADMIN — BUMETANIDE 2.5 MG/HR: 0.25 INJECTION INTRAMUSCULAR; INTRAVENOUS at 14:25

## 2019-06-02 RX ADMIN — Medication 100 MILLIGRAM(S): at 17:57

## 2019-06-02 RX ADMIN — Medication 50 MICROGRAM(S): at 05:14

## 2019-06-02 RX ADMIN — INSULIN GLARGINE 40 UNIT(S): 100 INJECTION, SOLUTION SUBCUTANEOUS at 21:16

## 2019-06-02 RX ADMIN — Medication 100 MILLIGRAM(S): at 05:15

## 2019-06-02 RX ADMIN — TICAGRELOR 90 MILLIGRAM(S): 90 TABLET ORAL at 05:15

## 2019-06-02 RX ADMIN — SENNA PLUS 2 TABLET(S): 8.6 TABLET ORAL at 21:17

## 2019-06-02 RX ADMIN — Medication 20 MILLIGRAM(S): at 21:16

## 2019-06-02 RX ADMIN — PANTOPRAZOLE SODIUM 40 MILLIGRAM(S): 20 TABLET, DELAYED RELEASE ORAL at 05:15

## 2019-06-02 RX ADMIN — Medication 20 MILLIGRAM(S): at 05:15

## 2019-06-02 RX ADMIN — Medication 20 MILLIGRAM(S): at 14:23

## 2019-06-02 RX ADMIN — MONTELUKAST 10 MILLIGRAM(S): 4 TABLET, CHEWABLE ORAL at 14:23

## 2019-06-02 RX ADMIN — BUMETANIDE 2.5 MG/HR: 0.25 INJECTION INTRAMUSCULAR; INTRAVENOUS at 19:16

## 2019-06-02 RX ADMIN — Medication 3: at 17:58

## 2019-06-02 RX ADMIN — TICAGRELOR 90 MILLIGRAM(S): 90 TABLET ORAL at 17:59

## 2019-06-02 RX ADMIN — Medication 40 MILLIEQUIVALENT(S): at 14:23

## 2019-06-02 RX ADMIN — Medication 13 UNIT(S): at 12:08

## 2019-06-02 RX ADMIN — Medication 3: at 12:07

## 2019-06-02 RX ADMIN — CHLORHEXIDINE GLUCONATE 1 APPLICATION(S): 213 SOLUTION TOPICAL at 10:15

## 2019-06-02 RX ADMIN — HEPARIN SODIUM 5000 UNIT(S): 5000 INJECTION INTRAVENOUS; SUBCUTANEOUS at 05:15

## 2019-06-02 RX ADMIN — Medication 81 MILLIGRAM(S): at 14:23

## 2019-06-02 RX ADMIN — HEPARIN SODIUM 5000 UNIT(S): 5000 INJECTION INTRAVENOUS; SUBCUTANEOUS at 17:57

## 2019-06-02 RX ADMIN — SPIRONOLACTONE 25 MILLIGRAM(S): 25 TABLET, FILM COATED ORAL at 05:14

## 2019-06-02 RX ADMIN — ATORVASTATIN CALCIUM 40 MILLIGRAM(S): 80 TABLET, FILM COATED ORAL at 21:16

## 2019-06-02 RX ADMIN — ISOSORBIDE DINITRATE 10 MILLIGRAM(S): 5 TABLET ORAL at 14:23

## 2019-06-02 RX ADMIN — ISOSORBIDE DINITRATE 10 MILLIGRAM(S): 5 TABLET ORAL at 21:16

## 2019-06-02 RX ADMIN — Medication 40 MILLIEQUIVALENT(S): at 10:14

## 2019-06-02 RX ADMIN — ISOSORBIDE DINITRATE 10 MILLIGRAM(S): 5 TABLET ORAL at 05:14

## 2019-06-02 RX ADMIN — Medication 0: at 21:17

## 2019-06-02 RX ADMIN — Medication 13 UNIT(S): at 17:57

## 2019-06-02 NOTE — PROGRESS NOTE ADULT - SUBJECTIVE AND OBJECTIVE BOX
Dell Sewell, PGY1  Page [phone]  After 7: Night Float pager  Alternate contact:     Patient is a 73y old  Female who presents with a chief complaint of SOB (2019 13:35)      SUBJECTIVE / OVERNIGHT EVENTS:  No acute overnight events. Has some constipation. No CP/SOB/palpitations.     MEDICATIONS  (STANDING):  aspirin  chewable 81 milliGRAM(s) Oral daily  atorvastatin 40 milliGRAM(s) Oral at bedtime  buMETAnide Infusion 1 mG/Hr (5 mL/Hr) IV Continuous <Continuous>  chlorhexidine 4% Liquid 1 Application(s) Topical <User Schedule>  dextrose 5%. 1000 milliLiter(s) (50 mL/Hr) IV Continuous <Continuous>  dextrose 50% Injectable 12.5 Gram(s) IV Push once  dextrose 50% Injectable 25 Gram(s) IV Push once  dextrose 50% Injectable 25 Gram(s) IV Push once  docusate sodium 100 milliGRAM(s) Oral two times a day  heparin  Injectable 5000 Unit(s) SubCutaneous every 12 hours  hydrALAZINE 20 milliGRAM(s) Oral three times a day  insulin glargine Injectable (LANTUS) 40 Unit(s) SubCutaneous at bedtime  insulin lispro (HumaLOG) corrective regimen sliding scale   SubCutaneous three times a day before meals  insulin lispro (HumaLOG) corrective regimen sliding scale   SubCutaneous at bedtime  insulin lispro Injectable (HumaLOG) 13 Unit(s) SubCutaneous three times a day before meals  isosorbide   dinitrate Tablet (ISORDIL) 10 milliGRAM(s) Oral three times a day  levothyroxine 50 MICROGram(s) Oral daily  metolazone 2.5 milliGRAM(s) Oral once  montelukast 10 milliGRAM(s) Oral daily  pantoprazole    Tablet 40 milliGRAM(s) Oral before breakfast  senna 2 Tablet(s) Oral at bedtime  spironolactone 25 milliGRAM(s) Oral daily  ticagrelor 90 milliGRAM(s) Oral two times a day    MEDICATIONS  (PRN):  dextrose 40% Gel 15 Gram(s) Oral once PRN Blood Glucose LESS THAN 70 milliGRAM(s)/deciliter  glucagon  Injectable 1 milliGRAM(s) IntraMuscular once PRN Glucose LESS THAN 70 milligrams/deciliter      Vital Signs Last 24 Hrs  T(C): 36.6 (2019 04:00), Max: 36.6 (2019 12:00)  T(F): 97.8 (2019 04:00), Max: 97.9 (2019 12:00)  HR: 81 (2019 07:29) (76 - 90)  BP: 109/58 (2019 06:00) (91/46 - 115/62)  BP(mean): 70 (2019 06:00) (55 - 75)  RR: 21 (2019 06:00) (14 - 33)  SpO2: 98% (2019 07:29) (96% - 100%)  CAPILLARY BLOOD GLUCOSE      POCT Blood Glucose.: 130 mg/dL (2019 07:58)  POCT Blood Glucose.: 238 mg/dL (2019 21:39)  POCT Blood Glucose.: 348 mg/dL (2019 17:03)  POCT Blood Glucose.: 363 mg/dL (2019 11:51)    I&O's Summary    2019 07:01  -  2019 07:00  --------------------------------------------------------  IN: 475 mL / OUT: 4475 mL / NET: -4000 mL        PHYSICAL EXAM:  GENERAL: NAD, well-developed  EYES: EOMI, PERRLA, conjunctiva and sclera clear  NECK:  No JVD  CHEST/LUNG: CTABL ; No wheeze  HEART: RRR; No murmurs  ABDOMEN: Soft, Nontender, Nondistended; Bowel sounds present  : +Oscar  EXTREMITIES:  2+ Peripheral Pulses, No edema  PSYCH: AAOx3  NEUROLOGY: non-focal    LABS:                        7.9    12.66 )-----------( 372      ( 2019 05:45 )             25.8     06-02    148<H>  |  107  |  60<H>  ----------------------------<  110<H>  3.4<L>   |  23  |  1.82<H>    Ca    8.8      2019 05:45  Phos  3.2     06-02  Mg     2.0     06-    TPro  8.0  /  Alb  3.8  /  TBili  0.2  /  DBili  x   /  AST  20  /  ALT  20  /  AlkPhos  95  06-02    PT/INR - ( 2019 03:23 )   PT: 12.8 SEC;   INR: 1.12          PTT - ( 2019 03:23 )  PTT:38.1 SEC  CARDIAC MARKERS ( 2019 08:00 )  x     / x     / 142 u/L / 10.40 ng/mL / x      CARDIAC MARKERS ( 2019 04:42 )  x     / x     / 93 u/L / 6.02 ng/mL / x          Urinalysis Basic - ( 2019 08:00 )    Color: COLORLESS / Appearance: CLEAR / S.009 / pH: 6.0  Gluc: 500 / Ketone: NEGATIVE  / Bili: NEGATIVE / Urobili: NORMAL   Blood: NEGATIVE / Protein: NEGATIVE / Nitrite: NEGATIVE   Leuk Esterase: NEGATIVE / RBC: x / WBC x   Sq Epi: x / Non Sq Epi: x / Bacteria: x

## 2019-06-02 NOTE — PROGRESS NOTE ADULT - SUBJECTIVE AND OBJECTIVE BOX
[facility]   MD RAHEEL SHAH MD RUORU WONG, PA    TEL:  OFFICE: [phone]  DR SANTOYO CELL: [phone]  JUSTEN KENDALL CELL: [phone]  DR. NGUYEN CELL: [phone]    RENAL FOLLOW UP NOTE  --------------------------------------------------------------------------------  HPI:      Pt seen and examined at bedside in CCU  On Bumex drip    PAST HISTORY  --------------------------------------------------------------------------------  No significant changes to PMH, PSH, FHx, SHx, unless otherwise noted    ALLERGIES & MEDICATIONS  --------------------------------------------------------------------------------  Allergies    azithromycin (Hives; Pruritus)    Intolerances      Standing Inpatient Medications  aspirin  chewable 81 milliGRAM(s) Oral daily  atorvastatin 40 milliGRAM(s) Oral at bedtime  buMETAnide Infusion 0.5 mG/Hr IV Continuous <Continuous>  chlorhexidine 4% Liquid 1 Application(s) Topical <User Schedule>  dextrose 5%. 1000 milliLiter(s) IV Continuous <Continuous>  dextrose 50% Injectable 12.5 Gram(s) IV Push once  dextrose 50% Injectable 25 Gram(s) IV Push once  dextrose 50% Injectable 25 Gram(s) IV Push once  docusate sodium 100 milliGRAM(s) Oral two times a day  heparin  Injectable 5000 Unit(s) SubCutaneous every 12 hours  hydrALAZINE 20 milliGRAM(s) Oral three times a day  insulin glargine Injectable (LANTUS) 40 Unit(s) SubCutaneous at bedtime  insulin lispro (HumaLOG) corrective regimen sliding scale   SubCutaneous three times a day before meals  insulin lispro (HumaLOG) corrective regimen sliding scale   SubCutaneous at bedtime  insulin lispro Injectable (HumaLOG) 13 Unit(s) SubCutaneous three times a day before meals  isosorbide   dinitrate Tablet (ISORDIL) 10 milliGRAM(s) Oral three times a day  levothyroxine 50 MICROGram(s) Oral daily  montelukast 10 milliGRAM(s) Oral daily  pantoprazole    Tablet 40 milliGRAM(s) Oral before breakfast  potassium chloride    Tablet ER 40 milliEquivalent(s) Oral every 4 hours  senna 2 Tablet(s) Oral at bedtime  spironolactone 25 milliGRAM(s) Oral daily  ticagrelor 90 milliGRAM(s) Oral two times a day    PRN Inpatient Medications  dextrose 40% Gel 15 Gram(s) Oral once PRN  glucagon  Injectable 1 milliGRAM(s) IntraMuscular once PRN      REVIEW OF SYSTEMS  --------------------------------------------------------------------------------  General: no fever  CVS: no chest pain  RESP: + sob, no cough  ABD: no abdominal pain  : no dysuria,  MSK: no edema     VITALS/PHYSICAL EXAM  --------------------------------------------------------------------------------  T(C): 36.4 (06-02-19 @ 12:00), Max: 36.6 (06-02-19 @ 00:00)  HR: 88 (06-02-19 @ 11:00) (76 - 90)  BP: 102/50 (06-02-19 @ 11:00) (91/46 - 115/62)  RR: 31 (06-02-19 @ 11:00) (14 - 32)  SpO2: 99% (06-02-19 @ 11:00) (96% - 100%)  Wt(kg): --  Height (cm): 149.86 (06-01-19 @ 06:33)  Weight (kg): 57.4 (06-01-19 @ 06:33)  BMI (kg/m2): 25.6 (06-01-19 @ 06:33)  BSA (m2): 1.52 (06-01-19 @ 06:33)      06-01-19 @ 07:01  -  06-02-19 @ 07:00  --------------------------------------------------------  IN: 475 mL / OUT: 4475 mL / NET: -4000 mL    06-02-19 @ 07:01  -  06-02-19 @ 12:58  --------------------------------------------------------  IN: 0 mL / OUT: 350 mL / NET: -350 mL      Physical Exam:  	Gen: NAD  	HEENT: MMM  	Pulm: Crackles B/L  	CV: S1S2  	Abd: Soft, +BS  	Ext: No LE edema B/L                      Neuro: Awake   	Skin: Warm and Dry   	Rufino cuellar    LABS/STUDIES  --------------------------------------------------------------------------------              7.9    12.66 >-----------<  372      [06-02-19 @ 05:45]              25.8     148  |  107  |  60  ----------------------------<  110      [06-02-19 @ 05:45]  3.4   |  23  |  1.82        Ca     8.8     [06-02-19 @ 05:45]      Mg     2.0     [06-02-19 @ 05:45]      Phos  3.2     [06-02-19 @ 05:45]    TPro  8.0  /  Alb  3.8  /  TBili  0.2  /  DBili  x   /  AST  20  /  ALT  20  /  AlkPhos  95  [06-02-19 @ 05:45]    PT/INR: PT 12.8 , INR 1.12       [06-01-19 @ 03:23]  PTT: 38.1       [06-01-19 @ 03:23]          [06-01-19 @ 08:00]    Creatinine Trend:  SCr 1.82 [06-02 @ 05:45]  SCr 1.96 [06-01 @ 17:20]  SCr 2.10 [06-01 @ 08:00]  SCr 2.13 [06-01 @ 03:05]  SCr 1.96 [05-16 @ 02:35]    Urinalysis - [06-01-19 @ 08:00]      Color COLORLESS / Appearance CLEAR / SG 1.009 / pH 6.0      Gluc 500 / Ketone NEGATIVE  / Bili NEGATIVE / Urobili NORMAL       Blood NEGATIVE / Protein NEGATIVE / Leuk Est NEGATIVE / Nitrite NEGATIVE      RBC  / WBC  / Hyaline  / Gran  / Sq Epi  / Non Sq Epi  / Bacteria       .4 (Ca --)      [04-25-19 @ 05:45]   --  PTH 43.55 (Ca --)      [09-10-18 @ 07:15]   --  PTH 36.60 (Ca --)      [09-08-18 @ 03:15]   --  Vitamin D (25OH) 25.9      [05-01-19 @ 04:00]  HbA1c 7.8      [06-01-19 @ 08:00]  TSH 0.75      [06-01-19 @ 08:00]  Lipid: chol 148, , HDL 35,       [06-01-19 @ 08:00]

## 2019-06-02 NOTE — PROGRESS NOTE ADULT - ASSESSMENT
74 y/o female with pmhx of HTN, DM, CAD s/p CABG, HFrEF (LVEF 25%) severe MR, severe pulm HTN presents from [facility] with shortness of breath, increased wob and fatigue. Admit to CCU for management of ADHF

## 2019-06-02 NOTE — PROGRESS NOTE ADULT - ASSESSMENT
72 y/o female with PMHx of HTN, DM, CAD s/p CABG (LIMA to LAD, SVG to OM, SVG to PDA at [facility] 2014), HFrEF (LVEF 25%) severe MR, severe pulm HTN, hypothyroidism, [facility]  MERVIN  Likely sec to CHF/renal vasocongestion   Scr 2.1 on admission   Renal function improving on diurectics  Continue diuretics per cardiology - on bumex drip  IF renal function continues to worsen- consider holding spirinolactone until renal fucntion stabilizes   Monitor bmp  Avoid nephrotoxic agents      CKD stage 3  baseline cr 1.5-1.8  likely sec to HTN/DM/chf  MOnitor renal function     SOB  likely sec to HF   Monitor daily weight and i/o  Diuretics per cardio      Acidosis   MOnitor serum Co2

## 2019-06-02 NOTE — CHART NOTE - NSCHARTNOTEFT_GEN_A_CORE
Pt tachypneic to 40s, and HR elevated to 105 on 3L NC. Sats were 99% with good waveform at 4:40pm today. Pt appeared in mild respiratory distress, and complained about some diffuse chest pressure on the left with no radiation. Pt reports feeling anxious, but states no anxiety/panic attacks in the past. She feels short of breath. On SQH, no leg swelling/pain in LE.      PHYSICAL EXAM:    Vital Signs Last 24 Hrs  T(C): 37.2 (02 Jun 2019 16:00), Max: 37.2 (02 Jun 2019 16:00)  T(F): 98.9 (02 Jun 2019 16:00), Max: 98.9 (02 Jun 2019 16:00)  HR: 96 (02 Jun 2019 16:35) (76 - 96)  BP: 112/58 (02 Jun 2019 16:35) (91/46 - 115/62)  BP(mean): 71 (02 Jun 2019 16:35) (54 - 75)  RR: 27 (02 Jun 2019 16:35) (21 - 32)  SpO2: 99% (02 Jun 2019 16:35) (96% - 100%)    Neck: No JVD.    Heart: tachycardic, regular. No murmurs, rubs, or gallops.   Lungs: CTAB. No wheezes, crackles, or rhonchi.    Abdomen: BS+, soft, NT/ND.  No organomegaly.  Extremities: No edema, clubbing, or cyanosis.  2+ peripheral pulses b/l.      A+P:  -R/o ACS. Repeat EKG performed, shows no ST changes, T wave inversions. T-wave flattening in V4, V5 which was present in previous EKG. Appears to be no change from previous EKG done this morning. -Cardiac enzymes, trop: 256 up from 199 this morning in the setting of MERVIN. Most likely from MERVIN related retention.   -ABG: pH 7.54, pCO2: 24, pO2: 90. PE unlikely given these ABG findings.   -Improvements in HR -> back to baseline at 5:30pm, and RR now down to 23-30. Will cont to observe closely.   -repeat CXR; cxr from this morning shows improving pulmonary congestion.   -Tachypneia may be due to pulm congestion--will place back on BIPAP for resp comfort.

## 2019-06-03 ENCOUNTER — APPOINTMENT (OUTPATIENT)
Dept: CARDIOTHORACIC SURGERY | Facility: CLINIC | Age: 74
End: 2019-06-03

## 2019-06-03 LAB
ALBUMIN SERPL ELPH-MCNC: 4.1 G/DL — SIGNIFICANT CHANGE UP (ref 3.3–5)
ALP SERPL-CCNC: 101 U/L — SIGNIFICANT CHANGE UP (ref 40–120)
ALT FLD-CCNC: 20 U/L — SIGNIFICANT CHANGE UP (ref 4–33)
ANION GAP SERPL CALC-SCNC: 18 MMO/L — HIGH (ref 7–14)
AST SERPL-CCNC: 21 U/L — SIGNIFICANT CHANGE UP (ref 4–32)
BILIRUB SERPL-MCNC: 0.3 MG/DL — SIGNIFICANT CHANGE UP (ref 0.2–1.2)
BUN SERPL-MCNC: 63 MG/DL — HIGH (ref 7–23)
CALCIUM SERPL-MCNC: 9.9 MG/DL — SIGNIFICANT CHANGE UP (ref 8.4–10.5)
CHLORIDE SERPL-SCNC: 103 MMOL/L — SIGNIFICANT CHANGE UP (ref 98–107)
CO2 SERPL-SCNC: 21 MMOL/L — LOW (ref 22–31)
CREAT SERPL-MCNC: 1.95 MG/DL — HIGH (ref 0.5–1.3)
GLUCOSE BLDC GLUCOMTR-MCNC: 137 MG/DL — HIGH (ref 70–99)
GLUCOSE BLDC GLUCOMTR-MCNC: 187 MG/DL — HIGH (ref 70–99)
GLUCOSE BLDC GLUCOMTR-MCNC: 232 MG/DL — HIGH (ref 70–99)
GLUCOSE BLDC GLUCOMTR-MCNC: 271 MG/DL — HIGH (ref 70–99)
GLUCOSE SERPL-MCNC: 192 MG/DL — HIGH (ref 70–99)
HCT VFR BLD CALC: 28.6 % — LOW (ref 34.5–45)
HGB BLD-MCNC: 8.6 G/DL — LOW (ref 11.5–15.5)
MAGNESIUM SERPL-MCNC: 2 MG/DL — SIGNIFICANT CHANGE UP (ref 1.6–2.6)
MCHC RBC-ENTMCNC: 26.8 PG — LOW (ref 27–34)
MCHC RBC-ENTMCNC: 30.1 % — LOW (ref 32–36)
MCV RBC AUTO: 89.1 FL — SIGNIFICANT CHANGE UP (ref 80–100)
NRBC # FLD: 0 K/UL — SIGNIFICANT CHANGE UP (ref 0–0)
PHOSPHATE SERPL-MCNC: 2.6 MG/DL — SIGNIFICANT CHANGE UP (ref 2.5–4.5)
PLATELET # BLD AUTO: 417 K/UL — HIGH (ref 150–400)
PMV BLD: 9.1 FL — SIGNIFICANT CHANGE UP (ref 7–13)
POTASSIUM SERPL-MCNC: 4.5 MMOL/L — SIGNIFICANT CHANGE UP (ref 3.5–5.3)
POTASSIUM SERPL-SCNC: 4.5 MMOL/L — SIGNIFICANT CHANGE UP (ref 3.5–5.3)
PROT SERPL-MCNC: 8.6 G/DL — HIGH (ref 6–8.3)
RBC # BLD: 3.21 M/UL — LOW (ref 3.8–5.2)
RBC # FLD: 16.7 % — HIGH (ref 10.3–14.5)
SODIUM SERPL-SCNC: 142 MMOL/L — SIGNIFICANT CHANGE UP (ref 135–145)
WBC # BLD: 12.38 K/UL — HIGH (ref 3.8–10.5)
WBC # FLD AUTO: 12.38 K/UL — HIGH (ref 3.8–10.5)

## 2019-06-03 PROCEDURE — 99233 SBSQ HOSP IP/OBS HIGH 50: CPT

## 2019-06-03 RX ORDER — ATORVASTATIN CALCIUM 80 MG/1
1 TABLET, FILM COATED ORAL
Qty: 0 | Refills: 0 | DISCHARGE
Start: 2019-06-03

## 2019-06-03 RX ORDER — METOPROLOL TARTRATE 50 MG
0.5 TABLET ORAL
Qty: 0 | Refills: 0 | DISCHARGE

## 2019-06-03 RX ORDER — POTASSIUM PHOSPHATE, MONOBASIC POTASSIUM PHOSPHATE, DIBASIC 236; 224 MG/ML; MG/ML
15 INJECTION, SOLUTION INTRAVENOUS ONCE
Refills: 0 | Status: DISCONTINUED | OUTPATIENT
Start: 2019-06-03 | End: 2019-06-03

## 2019-06-03 RX ORDER — TICAGRELOR 90 MG/1
1 TABLET ORAL
Qty: 0 | Refills: 0 | DISCHARGE
Start: 2019-06-03

## 2019-06-03 RX ORDER — TICAGRELOR 90 MG/1
1 TABLET ORAL
Qty: 0 | Refills: 0 | DISCHARGE

## 2019-06-03 RX ORDER — SENNA PLUS 8.6 MG/1
2 TABLET ORAL
Qty: 0 | Refills: 0 | DISCHARGE
Start: 2019-06-03

## 2019-06-03 RX ORDER — DOCUSATE SODIUM 100 MG
1 CAPSULE ORAL
Qty: 0 | Refills: 0 | DISCHARGE
Start: 2019-06-03

## 2019-06-03 RX ORDER — ISOSORBIDE DINITRATE 5 MG/1
1 TABLET ORAL
Qty: 0 | Refills: 0 | DISCHARGE
Start: 2019-06-03

## 2019-06-03 RX ORDER — HEPARIN SODIUM 5000 [USP'U]/ML
5000 INJECTION INTRAVENOUS; SUBCUTANEOUS
Qty: 0 | Refills: 0 | DISCHARGE
Start: 2019-06-03

## 2019-06-03 RX ORDER — LEVOTHYROXINE SODIUM 125 MCG
1 TABLET ORAL
Qty: 0 | Refills: 0 | DISCHARGE
Start: 2019-06-03

## 2019-06-03 RX ORDER — PANTOPRAZOLE SODIUM 20 MG/1
1 TABLET, DELAYED RELEASE ORAL
Qty: 0 | Refills: 0 | DISCHARGE
Start: 2019-06-03

## 2019-06-03 RX ORDER — INSULIN LISPRO 100/ML
16 VIAL (ML) SUBCUTANEOUS
Refills: 0 | Status: DISCONTINUED | OUTPATIENT
Start: 2019-06-03 | End: 2019-06-05

## 2019-06-03 RX ORDER — ASPIRIN/CALCIUM CARB/MAGNESIUM 324 MG
1 TABLET ORAL
Qty: 0 | Refills: 0 | DISCHARGE
Start: 2019-06-03

## 2019-06-03 RX ORDER — MONTELUKAST 4 MG/1
1 TABLET, CHEWABLE ORAL
Qty: 0 | Refills: 0 | DISCHARGE
Start: 2019-06-03

## 2019-06-03 RX ORDER — INSULIN LISPRO 100/ML
18 VIAL (ML) SUBCUTANEOUS
Refills: 0 | Status: DISCONTINUED | OUTPATIENT
Start: 2019-06-03 | End: 2019-06-03

## 2019-06-03 RX ORDER — INSULIN LISPRO 100/ML
8 VIAL (ML) SUBCUTANEOUS ONCE
Refills: 0 | Status: COMPLETED | OUTPATIENT
Start: 2019-06-03 | End: 2019-06-03

## 2019-06-03 RX ORDER — HYDRALAZINE HCL 50 MG
2 TABLET ORAL
Qty: 0 | Refills: 0 | DISCHARGE
Start: 2019-06-03

## 2019-06-03 RX ORDER — SPIRONOLACTONE 25 MG/1
1 TABLET, FILM COATED ORAL
Qty: 0 | Refills: 0 | DISCHARGE
Start: 2019-06-03

## 2019-06-03 RX ADMIN — Medication 13 UNIT(S): at 09:13

## 2019-06-03 RX ADMIN — MONTELUKAST 10 MILLIGRAM(S): 4 TABLET, CHEWABLE ORAL at 12:13

## 2019-06-03 RX ADMIN — BUMETANIDE 2.5 MG/HR: 0.25 INJECTION INTRAMUSCULAR; INTRAVENOUS at 18:44

## 2019-06-03 RX ADMIN — Medication 81 MILLIGRAM(S): at 12:10

## 2019-06-03 RX ADMIN — TICAGRELOR 90 MILLIGRAM(S): 90 TABLET ORAL at 05:12

## 2019-06-03 RX ADMIN — Medication 50 MICROGRAM(S): at 05:08

## 2019-06-03 RX ADMIN — PANTOPRAZOLE SODIUM 40 MILLIGRAM(S): 20 TABLET, DELAYED RELEASE ORAL at 05:12

## 2019-06-03 RX ADMIN — Medication 100 MILLIGRAM(S): at 05:07

## 2019-06-03 RX ADMIN — CHLORHEXIDINE GLUCONATE 1 APPLICATION(S): 213 SOLUTION TOPICAL at 11:54

## 2019-06-03 RX ADMIN — Medication 100 MILLIGRAM(S): at 17:32

## 2019-06-03 RX ADMIN — Medication 3 MILLIGRAM(S): at 21:45

## 2019-06-03 RX ADMIN — Medication 0: at 21:45

## 2019-06-03 RX ADMIN — ISOSORBIDE DINITRATE 10 MILLIGRAM(S): 5 TABLET ORAL at 05:08

## 2019-06-03 RX ADMIN — INSULIN GLARGINE 40 UNIT(S): 100 INJECTION, SOLUTION SUBCUTANEOUS at 21:44

## 2019-06-03 RX ADMIN — Medication 20 MILLIGRAM(S): at 21:45

## 2019-06-03 RX ADMIN — HEPARIN SODIUM 5000 UNIT(S): 5000 INJECTION INTRAVENOUS; SUBCUTANEOUS at 17:32

## 2019-06-03 RX ADMIN — Medication 20 MILLIGRAM(S): at 05:07

## 2019-06-03 RX ADMIN — ATORVASTATIN CALCIUM 40 MILLIGRAM(S): 80 TABLET, FILM COATED ORAL at 21:45

## 2019-06-03 RX ADMIN — Medication 3: at 12:42

## 2019-06-03 RX ADMIN — ISOSORBIDE DINITRATE 10 MILLIGRAM(S): 5 TABLET ORAL at 21:44

## 2019-06-03 RX ADMIN — Medication 16 UNIT(S): at 12:42

## 2019-06-03 RX ADMIN — Medication 1: at 09:11

## 2019-06-03 RX ADMIN — SPIRONOLACTONE 25 MILLIGRAM(S): 25 TABLET, FILM COATED ORAL at 05:08

## 2019-06-03 RX ADMIN — Medication 8 UNIT(S): at 17:42

## 2019-06-03 RX ADMIN — ISOSORBIDE DINITRATE 10 MILLIGRAM(S): 5 TABLET ORAL at 15:40

## 2019-06-03 RX ADMIN — HEPARIN SODIUM 5000 UNIT(S): 5000 INJECTION INTRAVENOUS; SUBCUTANEOUS at 05:12

## 2019-06-03 RX ADMIN — Medication 20 MILLIGRAM(S): at 15:39

## 2019-06-03 RX ADMIN — SENNA PLUS 2 TABLET(S): 8.6 TABLET ORAL at 21:45

## 2019-06-03 RX ADMIN — TICAGRELOR 90 MILLIGRAM(S): 90 TABLET ORAL at 17:32

## 2019-06-03 RX ADMIN — BUMETANIDE 2.5 MG/HR: 0.25 INJECTION INTRAMUSCULAR; INTRAVENOUS at 09:15

## 2019-06-03 NOTE — PROGRESS NOTE ADULT - PROBLEM SELECTOR PLAN 4
DM2 (diabetes mellitus, type II)  Monitor blood glucose ACHS with ISS  Check HgA1c  Consistent carbohydrate diet  Continue statin therapy DM2 (diabetes mellitus, type II)  Currently on lantus 40 and humalog 13. Will increase humalog to 18 tid. Patient was on lantus 55 u at home.  Check HgA1c  Consistent carbohydrate diet  Continue statin therapy DM2 (diabetes mellitus, type II)  Currently on lantus 40 and humalog 13. Will increase humalog to 16 tid. Patient was on lantus 55 u at home.  Check HgA1c  Consistent carbohydrate diet  Continue statin therapy

## 2019-06-03 NOTE — PROGRESS NOTE ADULT - SUBJECTIVE AND OBJECTIVE BOX
[facility] [place] [facility]   MD RAHEEL SHAH MD ANGELA WONG, PA    TEL:  OFFICE: [phone]  DR SANTOYO CELL: [phone]  DR. NGUYEN CELL: [phone]  UMM KENDALL CELL: [phone]        Patient is a 73y old  Female who presents with a chief complaint of SOB (03 Jun 2019 07:30)      Patient seen and examined at bedside. + chest pain/sob    VITALS:  T(F): 97.7 (06-03-19 @ 04:00), Max: 98.9 (06-02-19 @ 16:00)  HR: 93 (06-03-19 @ 13:00)  BP: 91/45 (06-03-19 @ 13:00)  RR: 23 (06-03-19 @ 13:00)  SpO2: 98% (06-03-19 @ 13:00)  Wt(kg): --    06-02 @ 07:01  -  06-03 @ 07:00  --------------------------------------------------------  IN: 755 mL / OUT: 2685 mL / NET: -1930 mL    06-03 @ 07:01  -  06-03 @ 13:58  --------------------------------------------------------  IN: 315 mL / OUT: 810 mL / NET: -495 mL          PHYSICAL EXAM:  Constitutional: NAD  Neck: No JVD  Respiratory: CTAB, no wheezes, rales or rhonchi  Cardiovascular: S1, S2, RRR  Gastrointestinal: BS+, soft, NT/ND  Extremities: No peripheral edema    Hospital Medications:   MEDICATIONS  (STANDING):  aspirin  chewable 81 milliGRAM(s) Oral daily  atorvastatin 40 milliGRAM(s) Oral at bedtime  buMETAnide Infusion 0.5 mG/Hr (2.5 mL/Hr) IV Continuous <Continuous>  chlorhexidine 4% Liquid 1 Application(s) Topical <User Schedule>  dextrose 5%. 1000 milliLiter(s) (50 mL/Hr) IV Continuous <Continuous>  dextrose 50% Injectable 12.5 Gram(s) IV Push once  dextrose 50% Injectable 25 Gram(s) IV Push once  dextrose 50% Injectable 25 Gram(s) IV Push once  docusate sodium 100 milliGRAM(s) Oral two times a day  heparin  Injectable 5000 Unit(s) SubCutaneous every 12 hours  hydrALAZINE 20 milliGRAM(s) Oral three times a day  insulin glargine Injectable (LANTUS) 40 Unit(s) SubCutaneous at bedtime  insulin lispro (HumaLOG) corrective regimen sliding scale   SubCutaneous three times a day before meals  insulin lispro (HumaLOG) corrective regimen sliding scale   SubCutaneous at bedtime  insulin lispro Injectable (HumaLOG) 16 Unit(s) SubCutaneous three times a day before meals  isosorbide   dinitrate Tablet (ISORDIL) 10 milliGRAM(s) Oral three times a day  levothyroxine 50 MICROGram(s) Oral daily  melatonin 3 milliGRAM(s) Oral once  montelukast 10 milliGRAM(s) Oral daily  pantoprazole    Tablet 40 milliGRAM(s) Oral before breakfast  senna 2 Tablet(s) Oral at bedtime  spironolactone 25 milliGRAM(s) Oral daily  ticagrelor 90 milliGRAM(s) Oral two times a day      LABS:  06-03    142  |  103  |  63<H>  ----------------------------<  192<H>  4.5   |  21<L>  |  1.95<H>    Ca    9.9      03 Jun 2019 04:45  Phos  2.6     06-03  Mg     2.0     06-03    TPro  8.6<H>  /  Alb  4.1  /  TBili  0.3  /  DBili      /  AST  21  /  ALT  20  /  AlkPhos  101  06-03    Creatinine Trend: 1.95 <--, 1.87 <--, 1.82 <--, 1.96 <--, 2.10 <--, 2.13 <--    Phosphorus Level, Serum: 2.6 mg/dL (06-03 @ 04:45)  Albumin, Serum: 4.1 g/dL (06-03 @ 04:45)                              8.6    12.38 )-----------( 417      ( 03 Jun 2019 04:45 )             28.6     Urine Studies:  Urinalysis - [06-01-19 @ 08:00]      Color COLORLESS / Appearance CLEAR / SG 1.009 / pH 6.0      Gluc 500 / Ketone NEGATIVE  / Bili NEGATIVE / Urobili NORMAL       Blood NEGATIVE / Protein NEGATIVE / Leuk Est NEGATIVE / Nitrite NEGATIVE      RBC  / WBC  / Hyaline  / Gran  / Sq Epi  / Non Sq Epi  / Bacteria       .4 (Ca --)      [04-25-19 @ 05:45]   --  PTH 43.55 (Ca --)      [09-10-18 @ 07:15]   --  PTH 36.60 (Ca --)      [09-08-18 @ 03:15]   --  Vitamin D (25OH) 25.9      [05-01-19 @ 04:00]  HbA1c 7.8      [06-01-19 @ 08:00]  TSH 0.75      [06-01-19 @ 08:00]  Lipid: chol 148, , HDL 35,       [06-01-19 @ 08:00]        RADIOLOGY & ADDITIONAL STUDIES:

## 2019-06-03 NOTE — PROGRESS NOTE ADULT - SUBJECTIVE AND OBJECTIVE BOX
Patient is a 73y old  Female who presents with a chief complaint of SOB (01 Jun 2019 13:35)      SUBJECTIVE / OVERNIGHT EVENTS:    MEDICATIONS  (STANDING):      Vital Signs Last 24 Hrs      CAPILLARY BLOOD GLUCOSE      I&O's Summary      PHYSICAL EXAM:      LABS:      PT/INR -    Urinalysis Patient is a 73y old  Female who presents with a chief complaint of SOB (01 Jun 2019 13:35)    SUBJECTIVE / OVERNIGHT EVENTS: Patient describes 'chest tightness' that is not new. No other events overnight.    MEDICATIONS  (STANDING):  aspirin  chewable 81 milliGRAM(s) Oral daily  atorvastatin 40 milliGRAM(s) Oral at bedtime  buMETAnide Infusion 0.5 mG/Hr (2.5 mL/Hr) IV Continuous <Continuous>  chlorhexidine 4% Liquid 1 Application(s) Topical <User Schedule>  dextrose 5%. 1000 milliLiter(s) (50 mL/Hr) IV Continuous <Continuous>  dextrose 50% Injectable 12.5 Gram(s) IV Push once  dextrose 50% Injectable 25 Gram(s) IV Push once  dextrose 50% Injectable 25 Gram(s) IV Push once  docusate sodium 100 milliGRAM(s) Oral two times a day  heparin  Injectable 5000 Unit(s) SubCutaneous every 12 hours  hydrALAZINE 20 milliGRAM(s) Oral three times a day  insulin glargine Injectable (LANTUS) 40 Unit(s) SubCutaneous at bedtime  insulin lispro (HumaLOG) corrective regimen sliding scale   SubCutaneous three times a day before meals  insulin lispro (HumaLOG) corrective regimen sliding scale   SubCutaneous at bedtime  insulin lispro Injectable (HumaLOG) 13 Unit(s) SubCutaneous three times a day before meals  isosorbide   dinitrate Tablet (ISORDIL) 10 milliGRAM(s) Oral three times a day  levothyroxine 50 MICROGram(s) Oral daily  melatonin 3 milliGRAM(s) Oral once  montelukast 10 milliGRAM(s) Oral daily  pantoprazole    Tablet 40 milliGRAM(s) Oral before breakfast  senna 2 Tablet(s) Oral at bedtime  spironolactone 25 milliGRAM(s) Oral daily  ticagrelor 90 milliGRAM(s) Oral two times a day    MEDICATIONS  (PRN):  dextrose 40% Gel 15 Gram(s) Oral once PRN Blood Glucose LESS THAN 70 milliGRAM(s)/deciliter  glucagon  Injectable 1 milliGRAM(s) IntraMuscular once PRN Glucose LESS THAN 70 milligrams/deciliter    ICU Vital Signs Last 24 Hrs  T(C): 36.5 (03 Jun 2019 04:00), Max: 37.2 (02 Jun 2019 16:00)  T(F): 97.7 (03 Jun 2019 04:00), Max: 98.9 (02 Jun 2019 16:00)  HR: 91 (03 Jun 2019 10:00) (80 - 102)  BP: 107/67 (03 Jun 2019 10:00) (96/43 - 124/57)  BP(mean): 67 (03 Jun 2019 10:00) (54 - 73)  ABP: --  ABP(mean): --  RR: 27 (03 Jun 2019 10:00) (16 - 44)  SpO2: 100% (03 Jun 2019 10:00) (98% - 100%)    CAPILLARY BLOOD GLUCOSE    POCT Blood Glucose.: 187 mg/dL (03 Jun 2019 09:07)  POCT Blood Glucose.: 221 mg/dL (02 Jun 2019 21:12)  POCT Blood Glucose.: 174 mg/dL (02 Jun 2019 16:41)  POCT Blood Glucose.: 267 mg/dL (02 Jun 2019 11:47)    I&O's Summary    02 Jun 2019 07:01  -  03 Jun 2019 07:00  --------------------------------------------------------  IN: 755 mL / OUT: 2685 mL / NET: -1930 mL    03 Jun 2019 07:01  -  03 Jun 2019 11:21  --------------------------------------------------------  IN: 107.5 mL / OUT: 0 mL / NET: 107.5 mL    PHYSICAL EXAM:  GENERAL: lethargic  HEAD:  Atraumatic, Normocephalic  EYES: EOMI, conjunctiva and sclera clear  NECK: Supple, No JVD  CHEST/LUNG: Coarse breath sounds  HEART: Regular rate and rhythm; No rubs, or gallops  ABDOMEN: Soft, Nontender, Nondistended; Bowel sounds present  EXTREMITIES:  2+ Peripheral Pulses, No clubbing, cyanosis, or edema  NEUROLOGY: non-focal  SKIN: No rashes or lesions      LABS:  CBC Full  -  ( 03 Jun 2019 04:45 )  WBC Count : 12.38 K/uL  RBC Count : 3.21 M/uL  Hemoglobin : 8.6 g/dL  Hematocrit : 28.6 %  Platelet Count - Automated : 417 K/uL  Mean Cell Volume : 89.1 fL  Mean Cell Hemoglobin : 26.8 pg  Mean Cell Hemoglobin Concentration : 30.1 %  Auto Neutrophil # : x  Auto Lymphocyte # : x  Auto Monocyte # : x  Auto Eosinophil # : x  Auto Basophil # : x  Auto Neutrophil % : x  Auto Lymphocyte % : x  Auto Monocyte % : x  Auto Eosinophil % : x  Auto Basophil % : x    06-03    142  |  103  |  63<H>  ----------------------------<  192<H>  4.5   |  21<L>  |  1.95<H>    Ca    9.9      03 Jun 2019 04:45  Phos  2.6     06-03  Mg     2.0     06-03    TPro  8.6<H>  /  Alb  4.1  /  TBili  0.3  /  DBili  x   /  AST  21  /  ALT  20  /  AlkPhos  101  06-03

## 2019-06-03 NOTE — PROGRESS NOTE ADULT - ASSESSMENT
72 y/o female with pmhx of HTN, DM, CAD s/p CABG, HFrEF (LVEF 25%) severe MR, severe pulm HTN presents from [place] [facility] with shortness of breath, increased wob and fatigue. Admit to CCU for management of ADHF 72 y/o female with pmhx of HTN, DM, CAD s/p CABG, HFrEF (LVEF 25%) severe MR, severe pulm HTN presents from [place] [facility] with shortness of breath, increased wob and fatigue. Admitted to CCU for management of ADHF, now pending transfer to [facility] for mitral clip evaluation.

## 2019-06-03 NOTE — PROGRESS NOTE ADULT - PROBLEM SELECTOR PLAN 3
Patient has h/o hyperlipidemia and is on atorvastatin 40 mg qhs  Lipid panel in am  Continue statin therapy with atorvastatin 40 mg qhs  Low fat diet Patient has h/o hyperlipidemia and is on atorvastatin 40 mg qhs  Low fat diet

## 2019-06-03 NOTE — PROGRESS NOTE ADULT - ASSESSMENT
72 y/o female with PMHx of HTN, DM, CAD s/p CABG (LIMA to LAD, SVG to OM, SVG to PDA at [facility] 2014), HFrEF (LVEF 25%) severe MR, severe pulm HTN, hypothyroidism, [facility]  MERVIN  Likely sec to CHF/renal vasocongestion   Scr 2.1 on admission   Renal function fluctuates slightly likely sec to CHF  Continue diuretics per cardiology - on bumex drip  IF renal function continues to worsen- consider holding spirinolactone until renal fucntion stabilizes   Monitor bmp  Avoid nephrotoxic agents      CKD stage 3  baseline cr 1.5-1.8  likely sec to HTN/DM/chf  MOnitor renal function     SOB  likely sec to HF   Monitor daily weight and i/o  Diuretics per cardio  Patient with severe MR await Mitral Clip eval pending transfer to [facility]      Acidosis   MOnitor serum Co2

## 2019-06-03 NOTE — PROGRESS NOTE ADULT - PROBLEM SELECTOR PLAN 1
CHF (congestive heart failure), acute on chronic  H/O HFrEF (LVEF 25%) severe MR, severe pulm HTN with plan to evaluate for clip as outpt.  Recent RHC 5/9/19 showed RA 14, V 71/17, PA 69/20/41, PCWP 27, PA sat 39%, CO3.36, CI 2.37, SVR 1825. PVR 4.17.   Has used Bipap in the past. Does not use home CPAP.  H/O intubation last admission.    Pt. w/ bilalateral rales, SOB   CXR showing pulmonary edema  pBNP >4000  Admit toCCU  Admission labs to include CMP, Mag, phos, CBC, coags, A1c, pBNP, TSH, -CXR,   Heart failure core measures  TTE for LV function and WMA  Strict I/Os and standing daily weights  Bumex bolus 4mg IVP and initiate Bumex gtt @1  mg/ hr.  Continue Lipitor to 40 mg QHS  Trend BMP 2/2 diuresis and replete as needed, k>4, mg> 2  Metolazone 2.5 x 1 now  Bipap 15/5 50%   hold BB at this time in the setting ADHF. CHF (congestive heart failure), acute on chronic  H/O HFrEF (LVEF 25%) severe MR, severe pulm HTN with plan to evaluate for clip as outpt.  Recent RHC 5/9/19 showed RA 14, V 71/17, PA 69/20/41, PCWP 27, PA sat 39%, CO3.36, CI 2.37, SVR 1825. PVR 4.17.   Has used Bipap in the past. Does not use home CPAP.  H/O intubation last admission.    Pt. w/ bilalateral rales, SOB   CXR showing pulmonary edema  pBNP >4000  Admitted toC  Heart failure core measures  TTE for LV function and WMA  Strict I/Os and standing daily weights  Continue Bumex gtt @ 0.5 mg/ hr.  Continue Lipitor 40 mg QHS  Trend BMP 2/2 diuresis and replete as needed, k>4, mg> 2  D/shannan Metolazone  Bipap 15/5 50%  hold BB at this time in the setting ADHF.

## 2019-06-04 DIAGNOSIS — I34.0 NONRHEUMATIC MITRAL (VALVE) INSUFFICIENCY: ICD-10-CM

## 2019-06-04 LAB
ANION GAP SERPL CALC-SCNC: 18 MMO/L — HIGH (ref 7–14)
ANION GAP SERPL CALC-SCNC: 19 MMO/L — HIGH (ref 7–14)
BUN SERPL-MCNC: 64 MG/DL — HIGH (ref 7–23)
BUN SERPL-MCNC: 78 MG/DL — HIGH (ref 7–23)
CALCIUM SERPL-MCNC: 10.3 MG/DL — SIGNIFICANT CHANGE UP (ref 8.4–10.5)
CALCIUM SERPL-MCNC: 9.7 MG/DL — SIGNIFICANT CHANGE UP (ref 8.4–10.5)
CHLORIDE SERPL-SCNC: 101 MMOL/L — SIGNIFICANT CHANGE UP (ref 98–107)
CHLORIDE SERPL-SCNC: 97 MMOL/L — LOW (ref 98–107)
CO2 SERPL-SCNC: 21 MMOL/L — LOW (ref 22–31)
CO2 SERPL-SCNC: 23 MMOL/L — SIGNIFICANT CHANGE UP (ref 22–31)
CREAT SERPL-MCNC: 2.12 MG/DL — HIGH (ref 0.5–1.3)
CREAT SERPL-MCNC: 2.57 MG/DL — HIGH (ref 0.5–1.3)
GLUCOSE BLDC GLUCOMTR-MCNC: 170 MG/DL — HIGH (ref 70–99)
GLUCOSE BLDC GLUCOMTR-MCNC: 190 MG/DL — HIGH (ref 70–99)
GLUCOSE BLDC GLUCOMTR-MCNC: 192 MG/DL — HIGH (ref 70–99)
GLUCOSE SERPL-MCNC: 199 MG/DL — HIGH (ref 70–99)
GLUCOSE SERPL-MCNC: 199 MG/DL — HIGH (ref 70–99)
HCT VFR BLD CALC: 28.9 % — LOW (ref 34.5–45)
HGB BLD-MCNC: 8.7 G/DL — LOW (ref 11.5–15.5)
MAGNESIUM SERPL-MCNC: 2 MG/DL — SIGNIFICANT CHANGE UP (ref 1.6–2.6)
MAGNESIUM SERPL-MCNC: 2 MG/DL — SIGNIFICANT CHANGE UP (ref 1.6–2.6)
MCHC RBC-ENTMCNC: 27 PG — SIGNIFICANT CHANGE UP (ref 27–34)
MCHC RBC-ENTMCNC: 30.1 % — LOW (ref 32–36)
MCV RBC AUTO: 89.8 FL — SIGNIFICANT CHANGE UP (ref 80–100)
NRBC # FLD: 0 K/UL — SIGNIFICANT CHANGE UP (ref 0–0)
PHOSPHATE SERPL-MCNC: 3.6 MG/DL — SIGNIFICANT CHANGE UP (ref 2.5–4.5)
PHOSPHATE SERPL-MCNC: 5.1 MG/DL — HIGH (ref 2.5–4.5)
PLATELET # BLD AUTO: 438 K/UL — HIGH (ref 150–400)
PMV BLD: 9 FL — SIGNIFICANT CHANGE UP (ref 7–13)
POTASSIUM SERPL-MCNC: 3.9 MMOL/L — SIGNIFICANT CHANGE UP (ref 3.5–5.3)
POTASSIUM SERPL-MCNC: 4.2 MMOL/L — SIGNIFICANT CHANGE UP (ref 3.5–5.3)
POTASSIUM SERPL-SCNC: 3.9 MMOL/L — SIGNIFICANT CHANGE UP (ref 3.5–5.3)
POTASSIUM SERPL-SCNC: 4.2 MMOL/L — SIGNIFICANT CHANGE UP (ref 3.5–5.3)
RBC # BLD: 3.22 M/UL — LOW (ref 3.8–5.2)
RBC # FLD: 16.4 % — HIGH (ref 10.3–14.5)
SODIUM SERPL-SCNC: 138 MMOL/L — SIGNIFICANT CHANGE UP (ref 135–145)
SODIUM SERPL-SCNC: 141 MMOL/L — SIGNIFICANT CHANGE UP (ref 135–145)
WBC # BLD: 12.18 K/UL — HIGH (ref 3.8–10.5)
WBC # FLD AUTO: 12.18 K/UL — HIGH (ref 3.8–10.5)

## 2019-06-04 PROCEDURE — 99233 SBSQ HOSP IP/OBS HIGH 50: CPT

## 2019-06-04 RX ORDER — ISOSORBIDE DINITRATE 5 MG/1
5 TABLET ORAL ONCE
Refills: 0 | Status: COMPLETED | OUTPATIENT
Start: 2019-06-04 | End: 2019-06-04

## 2019-06-04 RX ORDER — INSULIN GLARGINE 100 [IU]/ML
45 INJECTION, SOLUTION SUBCUTANEOUS AT BEDTIME
Refills: 0 | Status: DISCONTINUED | OUTPATIENT
Start: 2019-06-04 | End: 2019-06-05

## 2019-06-04 RX ADMIN — Medication 20 MILLIGRAM(S): at 21:07

## 2019-06-04 RX ADMIN — ISOSORBIDE DINITRATE 10 MILLIGRAM(S): 5 TABLET ORAL at 05:37

## 2019-06-04 RX ADMIN — CHLORHEXIDINE GLUCONATE 1 APPLICATION(S): 213 SOLUTION TOPICAL at 12:27

## 2019-06-04 RX ADMIN — SPIRONOLACTONE 25 MILLIGRAM(S): 25 TABLET, FILM COATED ORAL at 05:37

## 2019-06-04 RX ADMIN — Medication 81 MILLIGRAM(S): at 12:27

## 2019-06-04 RX ADMIN — Medication 16 UNIT(S): at 17:56

## 2019-06-04 RX ADMIN — BUMETANIDE 2.5 MG/HR: 0.25 INJECTION INTRAMUSCULAR; INTRAVENOUS at 08:15

## 2019-06-04 RX ADMIN — ISOSORBIDE DINITRATE 5 MILLIGRAM(S): 5 TABLET ORAL at 15:19

## 2019-06-04 RX ADMIN — Medication 16 UNIT(S): at 12:20

## 2019-06-04 RX ADMIN — PANTOPRAZOLE SODIUM 40 MILLIGRAM(S): 20 TABLET, DELAYED RELEASE ORAL at 05:37

## 2019-06-04 RX ADMIN — Medication 1: at 12:20

## 2019-06-04 RX ADMIN — Medication 100 MILLIGRAM(S): at 17:57

## 2019-06-04 RX ADMIN — Medication 16 UNIT(S): at 08:14

## 2019-06-04 RX ADMIN — Medication 50 MICROGRAM(S): at 05:37

## 2019-06-04 RX ADMIN — HEPARIN SODIUM 5000 UNIT(S): 5000 INJECTION INTRAVENOUS; SUBCUTANEOUS at 17:57

## 2019-06-04 RX ADMIN — Medication 20 MILLIGRAM(S): at 14:52

## 2019-06-04 RX ADMIN — HEPARIN SODIUM 5000 UNIT(S): 5000 INJECTION INTRAVENOUS; SUBCUTANEOUS at 05:37

## 2019-06-04 RX ADMIN — Medication 1: at 08:15

## 2019-06-04 RX ADMIN — TICAGRELOR 90 MILLIGRAM(S): 90 TABLET ORAL at 17:57

## 2019-06-04 RX ADMIN — INSULIN GLARGINE 45 UNIT(S): 100 INJECTION, SOLUTION SUBCUTANEOUS at 21:07

## 2019-06-04 RX ADMIN — ATORVASTATIN CALCIUM 40 MILLIGRAM(S): 80 TABLET, FILM COATED ORAL at 21:07

## 2019-06-04 RX ADMIN — Medication 1: at 17:56

## 2019-06-04 RX ADMIN — SENNA PLUS 2 TABLET(S): 8.6 TABLET ORAL at 21:08

## 2019-06-04 RX ADMIN — Medication 100 MILLIGRAM(S): at 05:37

## 2019-06-04 RX ADMIN — MONTELUKAST 10 MILLIGRAM(S): 4 TABLET, CHEWABLE ORAL at 12:24

## 2019-06-04 RX ADMIN — ISOSORBIDE DINITRATE 10 MILLIGRAM(S): 5 TABLET ORAL at 21:08

## 2019-06-04 RX ADMIN — Medication 20 MILLIGRAM(S): at 05:37

## 2019-06-04 RX ADMIN — TICAGRELOR 90 MILLIGRAM(S): 90 TABLET ORAL at 05:37

## 2019-06-04 NOTE — PROGRESS NOTE ADULT - PROBLEM SELECTOR PLAN 2
Continue levothyroxine 50mcg DM2 (diabetes mellitus, type II)  Currently on lantus 40 and humalog 16 tid. Patient was on lantus 55 u at home. Will increase lantus to 45.  Check HgA1c  Consistent carbohydrate diet  Continue statin therapy

## 2019-06-04 NOTE — PROGRESS NOTE ADULT - PROBLEM SELECTOR PLAN 1
CHF (congestive heart failure), acute on chronic  H/O HFrEF (LVEF 25%) severe MR, severe pulm HTN with plan to evaluate for clip as outpt.  Recent RHC 5/9/19 showed RA 14, V 71/17, PA 69/20/41, PCWP 27, PA sat 39%, CO3.36, CI 2.37, SVR 1825. PVR 4.17.   Has used Bipap in the past. Does not use home CPAP.  H/O intubation last admission.    Pt. w/ bilalateral rales, SOB   CXR showing pulmonary edema  pBNP >4000  Admitted toC  Heart failure core measures  TTE for LV function and WMA  Strict I/Os and standing daily weights  Continue Bumex gtt @ 0.5 mg/ hr.  Continue Lipitor 40 mg QHS  Trend BMP 2/2 diuresis and replete as needed, k>4, mg> 2  D/shannan Metolazone  Bipap 15/5 50%  hold BB at this time in the setting ADHF. CHF (congestive heart failure), acute on chronic  H/O HFrEF (LVEF 25%) severe MR, severe pulm HTN with plan to evaluate for clip at [facility].  Recent RHC 5/9/19 showed RA 14, V 71/17, PA 69/20/41, PCWP 27, PA sat 39%, CO3.36, CI 2.37, SVR 1825. PVR 4.17.   Has used Bipap in the past. Does not use home CPAP.  H/O intubation last admission.   Pt. w/ bilalateral rales, SOB   CXR showing pulmonary edema  pBNP >4000  Admitted [facility]  Heart failure core measures  TTE for LV function and WMA  Strict I/Os and standing daily weights  Continue Bumex gtt @ 0.5 mg/ hr.  Continue Lipitor 40 mg QHS  Trend BMP 2/2 diuresis and replete as needed, k>4, mg> 2  D/shannan Metolazone  Bipap 15/5 50%  hold BB at this time in the setting ADHF. CHF (congestive heart failure), acute on chronic  H/O HFrEF (LVEF 25%) severe MR, severe pulm HTN with plan to evaluate for clip at [facility].  Recent RHC 5/9/19 showed RA 14, V 71/17, PA 69/20/41, PCWP 27, PA sat 39%, CO3.36, CI 2.37, SVR 1825. PVR 4.17.   Has used Bipap in the past. Does not use home CPAP.  H/O intubation last admission.   Pt. w/ bilalateral rales, SOB   CXR showing pulmonary edema  pBNP >4000  Admitted to CCU  Heart failure core measures  TTE for LV function and WMA  Strict I/Os and standing daily weights  Continue Bumex gtt @ 0.5 mg/ hr.  Continue Lipitor 40 mg QHS  Trend BMP 2/2 diuresis and replete as needed, k>4, mg> 2  D/shannan Metolazone  Bipap 15/5 50%  hold BB at this time in the setting ADHF.

## 2019-06-04 NOTE — PROGRESS NOTE ADULT - ASSESSMENT
72 y/o female with PMHx of HTN, DM, CAD s/p CABG (LIMA to LAD, SVG to OM, SVG to PDA at [facility] 2014), HFrEF (LVEF 25%) severe MR, severe pulm HTN, hypothyroidism, [facility]  MERVIN  Likely sec to CHF/renal vasocongestion   Scr 2.1 on admission   Renal function fluctuates slightly likely sec to CHF.   renal function has been relatively stable ~ 1.8-2.1 could be a new baseline  Continue diuretics per cardiology - on bumex drip  Monitor bmp  Avoid nephrotoxic agents      CKD stage 3  baseline cr 1.5-1.8  likely sec to HTN/DM/chf  MOnitor renal function     SOB  likely sec to HF   Monitor daily weight and i/o  Diuretics per cardio  Patient with severe MR await Mitral Clip eval pending transfer to [facility]      Acidosis   MOnitor serum Co2

## 2019-06-04 NOTE — DIETITIAN INITIAL EVALUATION ADULT. - OTHER INFO
Pt referred for nutrition consult.  Met w/pt and family member, who provided hx.  Pt transferred from [place] 2/2 heart failure, SOB.  In [place], pt received KEVIN, NCS diet.  Pt w/recent [facility] admission 4/2019 - at that time pt was on a consistent carbohydrate DASH diet w/1L fluid restriction.  Pt stated UBW of 125-130lbs and wt at that admission was 132lbs (60kg).  Pt received diet instructions on that admission.  Pt now with great appetite for breakfast but with minimal po intake for L and D meals.  Pt follows Halal diet with no beef/pork, but is requesting Kosher at this time.  Pt w/o food allergies, difficulty chewing/swallowing, but has some difficulty w/dry type foods.  At home she takes B complex vitamins inconsistently.  Wt loss noted 3.2kg since admission - pt on Bumex drip.  No edema at this time.

## 2019-06-04 NOTE — PROGRESS NOTE ADULT - PROBLEM SELECTOR PLAN 4
DM2 (diabetes mellitus, type II)  Currently on lantus 40 and humalog 13. Will increase humalog to 16 tid. Patient was on lantus 55 u at home.  Check HgA1c  Consistent carbohydrate diet  Continue statin therapy Patient has h/o hyperlipidemia and is on atorvastatin 40 mg qhs  Low fat diet

## 2019-06-04 NOTE — PROGRESS NOTE ADULT - PROBLEM SELECTOR PLAN 3
Patient has h/o hyperlipidemia and is on atorvastatin 40 mg qhs  Low fat diet Continue levothyroxine 50mcg

## 2019-06-04 NOTE — PROGRESS NOTE ADULT - ASSESSMENT
72 y/o female with pmhx of HTN, DM, CAD s/p CABG, HFrEF (LVEF 25%) severe MR, severe pulm HTN presents from [place] [facility] with shortness of breath, increased wob and fatigue. Admitted to CCU for management of ADHF, now pending transfer to [facility] for mitral clip evaluation.

## 2019-06-04 NOTE — PROGRESS NOTE ADULT - SUBJECTIVE AND OBJECTIVE BOX
[facility]   MD RAHEEL SHAH MD ANGELA WONG, PA    TEL:  OFFICE: [phone]  DR SANTOYO CELL: [phone]  DR. NGUYEN CELL: [phone]  UMM KENDALL CELL: [phone]        Patient is a 73y old  Female who presents with a chief complaint of SOB (04 Jun 2019 07:25)      Patient seen and examined at bedside. feeling ok, denied chest pain    VITALS:  T(F): 99 (06-04-19 @ 04:00), Max: 99.1 (06-03-19 @ 20:13)  HR: 91 (06-04-19 @ 12:00)  BP: 110/59 (06-04-19 @ 12:00)  RR: 17 (06-04-19 @ 12:00)  SpO2: 99% (06-04-19 @ 12:00)  Wt(kg): --    06-03 @ 07:01  -  06-04 @ 07:00  --------------------------------------------------------  IN: 542.5 mL / OUT: 1955 mL / NET: -1412.5 mL    06-04 @ 07:01  -  06-04 @ 12:54  --------------------------------------------------------  IN: 205 mL / OUT: 235 mL / NET: -30 mL          PHYSICAL EXAM:  Constitutional: NAD  Neck: No JVD  Respiratory: CTAB, no wheezes, rales or rhonchi  Cardiovascular: S1, S2, RRR  Gastrointestinal: BS+, soft, NT/ND  Extremities: No peripheral edema    Hospital Medications:   MEDICATIONS  (STANDING):  aspirin  chewable 81 milliGRAM(s) Oral daily  atorvastatin 40 milliGRAM(s) Oral at bedtime  buMETAnide Infusion 0.5 mG/Hr (2.5 mL/Hr) IV Continuous <Continuous>  chlorhexidine 4% Liquid 1 Application(s) Topical <User Schedule>  dextrose 5%. 1000 milliLiter(s) (50 mL/Hr) IV Continuous <Continuous>  dextrose 50% Injectable 12.5 Gram(s) IV Push once  dextrose 50% Injectable 25 Gram(s) IV Push once  dextrose 50% Injectable 25 Gram(s) IV Push once  docusate sodium 100 milliGRAM(s) Oral two times a day  heparin  Injectable 5000 Unit(s) SubCutaneous every 12 hours  hydrALAZINE 20 milliGRAM(s) Oral three times a day  insulin glargine Injectable (LANTUS) 45 Unit(s) SubCutaneous at bedtime  insulin lispro (HumaLOG) corrective regimen sliding scale   SubCutaneous three times a day before meals  insulin lispro (HumaLOG) corrective regimen sliding scale   SubCutaneous at bedtime  insulin lispro Injectable (HumaLOG) 16 Unit(s) SubCutaneous three times a day before meals  isosorbide   dinitrate Tablet (ISORDIL) 10 milliGRAM(s) Oral three times a day  levothyroxine 50 MICROGram(s) Oral daily  montelukast 10 milliGRAM(s) Oral daily  pantoprazole    Tablet 40 milliGRAM(s) Oral before breakfast  senna 2 Tablet(s) Oral at bedtime  spironolactone 25 milliGRAM(s) Oral daily  ticagrelor 90 milliGRAM(s) Oral two times a day      LABS:  06-04    141  |  101  |  64<H>  ----------------------------<  199<H>  4.2   |  21<L>  |  2.12<H>    Ca    9.7      04 Jun 2019 03:50  Phos  3.6     06-04  Mg     2.0     06-04    TPro  8.6<H>  /  Alb  4.1  /  TBili  0.3  /  DBili      /  AST  21  /  ALT  20  /  AlkPhos  101  06-03    Creatinine Trend: 2.12 <--, 1.95 <--, 1.87 <--, 1.82 <--, 1.96 <--, 2.10 <--, 2.13 <--    Phosphorus Level, Serum: 3.6 mg/dL (06-04 @ 03:50)                              8.7    12.18 )-----------( 438      ( 04 Jun 2019 03:50 )             28.9     Urine Studies:  Urinalysis - [06-01-19 @ 08:00]      Color COLORLESS / Appearance CLEAR / SG 1.009 / pH 6.0      Gluc 500 / Ketone NEGATIVE  / Bili NEGATIVE / Urobili NORMAL       Blood NEGATIVE / Protein NEGATIVE / Leuk Est NEGATIVE / Nitrite NEGATIVE      RBC  / WBC  / Hyaline  / Gran  / Sq Epi  / Non Sq Epi  / Bacteria       .4 (Ca --)      [04-25-19 @ 05:45]   --  PTH 43.55 (Ca --)      [09-10-18 @ 07:15]   --  PTH 36.60 (Ca --)      [09-08-18 @ 03:15]   --  Vitamin D (25OH) 25.9      [05-01-19 @ 04:00]  HbA1c 7.8      [06-01-19 @ 08:00]  TSH 0.75      [06-01-19 @ 08:00]  Lipid: chol 148, , HDL 35,       [06-01-19 @ 08:00]        RADIOLOGY & ADDITIONAL STUDIES:

## 2019-06-04 NOTE — PROGRESS NOTE ADULT - SUBJECTIVE AND OBJECTIVE BOX
Patient is a 73y old  Female who presents with a chief complaint of SOB (01 Jun 2019 13:35)    SUBJECTIVE / OVERNIGHT EVENTS:    MEDICATIONS  (STANDING):      ICU Vital Signs Last 24 Hrs      CAPILLARY BLOOD GLUCOSE      I&O's Summary      PHYSICAL EXAM:      LABS: Patient is a 73y old  Female who presents with a chief complaint of SOB (01 Jun 2019 13:35)    SUBJECTIVE / OVERNIGHT EVENTS: No events overnight. Patient denies CP, SOB or palpitations. Still pending transfer to [facility] for mitral clip evaluation.    MEDICATIONS  (STANDING):  aspirin  chewable 81 milliGRAM(s) Oral daily  atorvastatin 40 milliGRAM(s) Oral at bedtime  buMETAnide Infusion 0.5 mG/Hr (2.5 mL/Hr) IV Continuous <Continuous>  chlorhexidine 4% Liquid 1 Application(s) Topical <User Schedule>  dextrose 5%. 1000 milliLiter(s) (50 mL/Hr) IV Continuous <Continuous>  dextrose 50% Injectable 12.5 Gram(s) IV Push once  dextrose 50% Injectable 25 Gram(s) IV Push once  dextrose 50% Injectable 25 Gram(s) IV Push once  docusate sodium 100 milliGRAM(s) Oral two times a day  heparin  Injectable 5000 Unit(s) SubCutaneous every 12 hours  hydrALAZINE 20 milliGRAM(s) Oral three times a day  insulin glargine Injectable (LANTUS) 40 Unit(s) SubCutaneous at bedtime  insulin lispro (HumaLOG) corrective regimen sliding scale   SubCutaneous three times a day before meals  insulin lispro (HumaLOG) corrective regimen sliding scale   SubCutaneous at bedtime  insulin lispro Injectable (HumaLOG) 16 Unit(s) SubCutaneous three times a day before meals  isosorbide   dinitrate Tablet (ISORDIL) 10 milliGRAM(s) Oral three times a day  levothyroxine 50 MICROGram(s) Oral daily  montelukast 10 milliGRAM(s) Oral daily  pantoprazole    Tablet 40 milliGRAM(s) Oral before breakfast  senna 2 Tablet(s) Oral at bedtime  spironolactone 25 milliGRAM(s) Oral daily  ticagrelor 90 milliGRAM(s) Oral two times a day    MEDICATIONS  (PRN):  dextrose 40% Gel 15 Gram(s) Oral once PRN Blood Glucose LESS THAN 70 milliGRAM(s)/deciliter  glucagon  Injectable 1 milliGRAM(s) IntraMuscular once PRN Glucose LESS THAN 70 milligrams/deciliter    ICU Vital Signs Last 24 Hrs  T(C): 37.2 (04 Jun 2019 04:00), Max: 37.3 (03 Jun 2019 20:13)  T(F): 99 (04 Jun 2019 04:00), Max: 99.1 (03 Jun 2019 20:13)  HR: 90 (04 Jun 2019 08:00) (82 - 104)  BP: 112/51 (04 Jun 2019 08:00) (91/45 - 117/54)  BP(mean): 67 (04 Jun 2019 08:00) (54 - 70)  ABP: --  ABP(mean): --  RR: 18 (04 Jun 2019 08:00) (18 - 28)  SpO2: 99% (04 Jun 2019 08:00) (96% - 100%)    CAPILLARY BLOOD GLUCOSE  POCT Blood Glucose.: 192 mg/dL (04 Jun 2019 07:54)  POCT Blood Glucose.: 232 mg/dL (03 Jun 2019 21:37)  POCT Blood Glucose.: 137 mg/dL (03 Jun 2019 17:08)  POCT Blood Glucose.: 271 mg/dL (03 Jun 2019 11:47)  POCT Blood Glucose.: 187 mg/dL (03 Jun 2019 09:07)    I&O's Summary    03 Jun 2019 07:01  -  04 Jun 2019 07:00  --------------------------------------------------------  IN: 542.5 mL / OUT: 1955 mL / NET: -1412.5 mL    04 Jun 2019 07:01  -  04 Jun 2019 09:02  --------------------------------------------------------  IN: 202.5 mL / OUT: 125 mL / NET: 77.5 mL    PHYSICAL EXAM:  GENERAL: NAD, comfortable  HEAD:  Atraumatic, Normocephalic  EYES: EOMI, conjunctiva and sclera clear  NECK: Supple, No JVD  CHEST/LUNG: Mild b/l rales at bases  HEART: Regular rate and rhythm; No murmurs, rubs, or gallops  ABDOMEN: Soft, Nontender, Nondistended; Bowel sounds present  EXTREMITIES:  2+ Peripheral Pulses, No clubbing, cyanosis, or edema  NEUROLOGY: non-focal  SKIN: No rashes or lesions    LABS:  CBC Full  -  ( 04 Jun 2019 03:50 )  WBC Count : 12.18 K/uL  RBC Count : 3.22 M/uL  Hemoglobin : 8.7 g/dL  Hematocrit : 28.9 %  Platelet Count - Automated : 438 K/uL  Mean Cell Volume : 89.8 fL  Mean Cell Hemoglobin : 27.0 pg  Mean Cell Hemoglobin Concentration : 30.1 %  Auto Neutrophil # : x  Auto Lymphocyte # : x  Auto Monocyte # : x  Auto Eosinophil # : x  Auto Basophil # : x  Auto Neutrophil % : x  Auto Lymphocyte % : x  Auto Monocyte % : x  Auto Eosinophil % : x  Auto Basophil % : x    CARDIAC MARKERS ( 02 Jun 2019 16:42 )  x     / x     / 98 u/L / 5.23 ng/mL / x Patient is a 73y old  Female who presents with a chief complaint of SOB (01 Jun 2019 13:35)    SUBJECTIVE / OVERNIGHT EVENTS: No events overnight. Patient continues to have similar 'chest heaviness' from yesterday, which is present on , SOB or palpitations. Still pending transfer to [facility] for mitral clip evaluation.    MEDICATIONS  (STANDING):  aspirin  chewable 81 milliGRAM(s) Oral daily  atorvastatin 40 milliGRAM(s) Oral at bedtime  buMETAnide Infusion 0.5 mG/Hr (2.5 mL/Hr) IV Continuous <Continuous>  chlorhexidine 4% Liquid 1 Application(s) Topical <User Schedule>  dextrose 5%. 1000 milliLiter(s) (50 mL/Hr) IV Continuous <Continuous>  dextrose 50% Injectable 12.5 Gram(s) IV Push once  dextrose 50% Injectable 25 Gram(s) IV Push once  dextrose 50% Injectable 25 Gram(s) IV Push once  docusate sodium 100 milliGRAM(s) Oral two times a day  heparin  Injectable 5000 Unit(s) SubCutaneous every 12 hours  hydrALAZINE 20 milliGRAM(s) Oral three times a day  insulin glargine Injectable (LANTUS) 40 Unit(s) SubCutaneous at bedtime  insulin lispro (HumaLOG) corrective regimen sliding scale   SubCutaneous three times a day before meals  insulin lispro (HumaLOG) corrective regimen sliding scale   SubCutaneous at bedtime  insulin lispro Injectable (HumaLOG) 16 Unit(s) SubCutaneous three times a day before meals  isosorbide   dinitrate Tablet (ISORDIL) 10 milliGRAM(s) Oral three times a day  levothyroxine 50 MICROGram(s) Oral daily  montelukast 10 milliGRAM(s) Oral daily  pantoprazole    Tablet 40 milliGRAM(s) Oral before breakfast  senna 2 Tablet(s) Oral at bedtime  spironolactone 25 milliGRAM(s) Oral daily  ticagrelor 90 milliGRAM(s) Oral two times a day    MEDICATIONS  (PRN):  dextrose 40% Gel 15 Gram(s) Oral once PRN Blood Glucose LESS THAN 70 milliGRAM(s)/deciliter  glucagon  Injectable 1 milliGRAM(s) IntraMuscular once PRN Glucose LESS THAN 70 milligrams/deciliter    ICU Vital Signs Last 24 Hrs  T(C): 37.2 (04 Jun 2019 04:00), Max: 37.3 (03 Jun 2019 20:13)  T(F): 99 (04 Jun 2019 04:00), Max: 99.1 (03 Jun 2019 20:13)  HR: 90 (04 Jun 2019 08:00) (82 - 104)  BP: 112/51 (04 Jun 2019 08:00) (91/45 - 117/54)  BP(mean): 67 (04 Jun 2019 08:00) (54 - 70)  ABP: --  ABP(mean): --  RR: 18 (04 Jun 2019 08:00) (18 - 28)  SpO2: 99% (04 Jun 2019 08:00) (96% - 100%)    CAPILLARY BLOOD GLUCOSE  POCT Blood Glucose.: 192 mg/dL (04 Jun 2019 07:54)  POCT Blood Glucose.: 232 mg/dL (03 Jun 2019 21:37)  POCT Blood Glucose.: 137 mg/dL (03 Jun 2019 17:08)  POCT Blood Glucose.: 271 mg/dL (03 Jun 2019 11:47)  POCT Blood Glucose.: 187 mg/dL (03 Jun 2019 09:07)    I&O's Summary    03 Jun 2019 07:01  -  04 Jun 2019 07:00  --------------------------------------------------------  IN: 542.5 mL / OUT: 1955 mL / NET: -1412.5 mL    04 Jun 2019 07:01  -  04 Jun 2019 09:02  --------------------------------------------------------  IN: 202.5 mL / OUT: 125 mL / NET: 77.5 mL    PHYSICAL EXAM:  GENERAL: NAD, comfortable  HEAD:  Atraumatic, Normocephalic  EYES: EOMI, conjunctiva and sclera clear  NECK: Supple, No JVD  CHEST/LUNG: Mild b/l rales at bases  HEART: Regular rate and rhythm; No murmurs, rubs, or gallops  ABDOMEN: Soft, Nontender, Nondistended; Bowel sounds present  EXTREMITIES:  2+ Peripheral Pulses, No clubbing, cyanosis, or edema  NEUROLOGY: non-focal  SKIN: No rashes or lesions    LABS:  CBC Full  -  ( 04 Jun 2019 03:50 )  WBC Count : 12.18 K/uL  RBC Count : 3.22 M/uL  Hemoglobin : 8.7 g/dL  Hematocrit : 28.9 %  Platelet Count - Automated : 438 K/uL  Mean Cell Volume : 89.8 fL  Mean Cell Hemoglobin : 27.0 pg  Mean Cell Hemoglobin Concentration : 30.1 %  Auto Neutrophil # : x  Auto Lymphocyte # : x  Auto Monocyte # : x  Auto Eosinophil # : x  Auto Basophil # : x  Auto Neutrophil % : x  Auto Lymphocyte % : x  Auto Monocyte % : x  Auto Eosinophil % : x  Auto Basophil % : x    CARDIAC MARKERS ( 02 Jun 2019 16:42 )  x     / x     / 98 u/L / 5.23 ng/mL / x Patient is a 73y old  Female who presents with a chief complaint of SOB (01 Jun 2019 13:35)    SUBJECTIVE / OVERNIGHT EVENTS: Patient continues to have similar 'chest heaviness' from yesterday, which is present on and off. No SOB or palpitations. Still pending transfer to [facility] for mitral clip evaluation.    MEDICATIONS  (STANDING):  aspirin  chewable 81 milliGRAM(s) Oral daily  atorvastatin 40 milliGRAM(s) Oral at bedtime  buMETAnide Infusion 0.5 mG/Hr (2.5 mL/Hr) IV Continuous <Continuous>  chlorhexidine 4% Liquid 1 Application(s) Topical <User Schedule>  dextrose 5%. 1000 milliLiter(s) (50 mL/Hr) IV Continuous <Continuous>  dextrose 50% Injectable 12.5 Gram(s) IV Push once  dextrose 50% Injectable 25 Gram(s) IV Push once  dextrose 50% Injectable 25 Gram(s) IV Push once  docusate sodium 100 milliGRAM(s) Oral two times a day  heparin  Injectable 5000 Unit(s) SubCutaneous every 12 hours  hydrALAZINE 20 milliGRAM(s) Oral three times a day  insulin glargine Injectable (LANTUS) 40 Unit(s) SubCutaneous at bedtime  insulin lispro (HumaLOG) corrective regimen sliding scale   SubCutaneous three times a day before meals  insulin lispro (HumaLOG) corrective regimen sliding scale   SubCutaneous at bedtime  insulin lispro Injectable (HumaLOG) 16 Unit(s) SubCutaneous three times a day before meals  isosorbide   dinitrate Tablet (ISORDIL) 10 milliGRAM(s) Oral three times a day  levothyroxine 50 MICROGram(s) Oral daily  montelukast 10 milliGRAM(s) Oral daily  pantoprazole    Tablet 40 milliGRAM(s) Oral before breakfast  senna 2 Tablet(s) Oral at bedtime  spironolactone 25 milliGRAM(s) Oral daily  ticagrelor 90 milliGRAM(s) Oral two times a day    MEDICATIONS  (PRN):  dextrose 40% Gel 15 Gram(s) Oral once PRN Blood Glucose LESS THAN 70 milliGRAM(s)/deciliter  glucagon  Injectable 1 milliGRAM(s) IntraMuscular once PRN Glucose LESS THAN 70 milligrams/deciliter    ICU Vital Signs Last 24 Hrs  T(C): 37.2 (04 Jun 2019 04:00), Max: 37.3 (03 Jun 2019 20:13)  T(F): 99 (04 Jun 2019 04:00), Max: 99.1 (03 Jun 2019 20:13)  HR: 90 (04 Jun 2019 08:00) (82 - 104)  BP: 112/51 (04 Jun 2019 08:00) (91/45 - 117/54)  BP(mean): 67 (04 Jun 2019 08:00) (54 - 70)  ABP: --  ABP(mean): --  RR: 18 (04 Jun 2019 08:00) (18 - 28)  SpO2: 99% (04 Jun 2019 08:00) (96% - 100%)    CAPILLARY BLOOD GLUCOSE  POCT Blood Glucose.: 192 mg/dL (04 Jun 2019 07:54)  POCT Blood Glucose.: 232 mg/dL (03 Jun 2019 21:37)  POCT Blood Glucose.: 137 mg/dL (03 Jun 2019 17:08)  POCT Blood Glucose.: 271 mg/dL (03 Jun 2019 11:47)  POCT Blood Glucose.: 187 mg/dL (03 Jun 2019 09:07)    I&O's Summary    03 Jun 2019 07:01  -  04 Jun 2019 07:00  --------------------------------------------------------  IN: 542.5 mL / OUT: 1955 mL / NET: -1412.5 mL    04 Jun 2019 07:01  -  04 Jun 2019 09:02  --------------------------------------------------------  IN: 202.5 mL / OUT: 125 mL / NET: 77.5 mL    PHYSICAL EXAM:  GENERAL: NAD, comfortable  HEAD:  Atraumatic, Normocephalic  EYES: EOMI, conjunctiva and sclera clear  NECK: Supple, No JVD  CHEST/LUNG: Mild b/l rales at bases  HEART: Regular rate and rhythm; No murmurs, rubs, or gallops  ABDOMEN: Soft, Nontender, Nondistended; Bowel sounds present  EXTREMITIES:  2+ Peripheral Pulses, No clubbing, cyanosis, or edema  NEUROLOGY: non-focal  SKIN: No rashes or lesions    LABS:  CBC Full  -  ( 04 Jun 2019 03:50 )  WBC Count : 12.18 K/uL  RBC Count : 3.22 M/uL  Hemoglobin : 8.7 g/dL  Hematocrit : 28.9 %  Platelet Count - Automated : 438 K/uL  Mean Cell Volume : 89.8 fL  Mean Cell Hemoglobin : 27.0 pg  Mean Cell Hemoglobin Concentration : 30.1 %  Auto Neutrophil # : x  Auto Lymphocyte # : x  Auto Monocyte # : x  Auto Eosinophil # : x  Auto Basophil # : x  Auto Neutrophil % : x  Auto Lymphocyte % : x  Auto Monocyte % : x  Auto Eosinophil % : x  Auto Basophil % : x    CARDIAC MARKERS ( 02 Jun 2019 16:42 )  x     / x     / 98 u/L / 5.23 ng/mL / x        06-04    141  |  101  |  64<H>  ----------------------------<  199<H>  4.2   |  21<L>  |  2.12<H>    Ca    9.7      04 Jun 2019 03:50  Phos  3.6     06-04  Mg     2.0     06-04    TPro  8.6<H>  /  Alb  4.1  /  TBili  0.3  /  DBili  x   /  AST  21  /  ALT  20  /  AlkPhos  101  06-03

## 2019-06-05 ENCOUNTER — INPATIENT (INPATIENT)
Facility: HOSPITAL | Age: 74
LOS: 13 days | Discharge: HOME | DRG: 228 | End: 2019-06-19
Attending: THORACIC SURGERY (CARDIOTHORACIC VASCULAR SURGERY) | Admitting: INTERNAL MEDICINE
Payer: MEDICARE

## 2019-06-05 VITALS
HEART RATE: 85 BPM | RESPIRATION RATE: 18 BRPM | OXYGEN SATURATION: 99 % | WEIGHT: 106.92 LBS | DIASTOLIC BLOOD PRESSURE: 68 MMHG | HEIGHT: 59 IN | SYSTOLIC BLOOD PRESSURE: 107 MMHG | TEMPERATURE: 98 F

## 2019-06-05 VITALS
OXYGEN SATURATION: 99 % | DIASTOLIC BLOOD PRESSURE: 52 MMHG | RESPIRATION RATE: 23 BRPM | TEMPERATURE: 99 F | HEART RATE: 91 BPM | SYSTOLIC BLOOD PRESSURE: 110 MMHG

## 2019-06-05 DIAGNOSIS — I25.10 ATHEROSCLEROTIC HEART DISEASE OF NATIVE CORONARY ARTERY WITHOUT ANGINA PECTORIS: ICD-10-CM

## 2019-06-05 DIAGNOSIS — I10 ESSENTIAL (PRIMARY) HYPERTENSION: ICD-10-CM

## 2019-06-05 DIAGNOSIS — Z95.1 PRESENCE OF AORTOCORONARY BYPASS GRAFT: Chronic | ICD-10-CM

## 2019-06-05 DIAGNOSIS — I50.9 HEART FAILURE, UNSPECIFIED: ICD-10-CM

## 2019-06-05 DIAGNOSIS — E11.65 TYPE 2 DIABETES MELLITUS WITH HYPERGLYCEMIA: ICD-10-CM

## 2019-06-05 DIAGNOSIS — Z29.9 ENCOUNTER FOR PROPHYLACTIC MEASURES, UNSPECIFIED: ICD-10-CM

## 2019-06-05 DIAGNOSIS — N17.9 ACUTE KIDNEY FAILURE, UNSPECIFIED: ICD-10-CM

## 2019-06-05 DIAGNOSIS — I34.0 NONRHEUMATIC MITRAL (VALVE) INSUFFICIENCY: ICD-10-CM

## 2019-06-05 LAB
ANION GAP SERPL CALC-SCNC: 18 MMO/L — HIGH (ref 7–14)
BUN SERPL-MCNC: 74 MG/DL — HIGH (ref 7–23)
CALCIUM SERPL-MCNC: 10.1 MG/DL — SIGNIFICANT CHANGE UP (ref 8.4–10.5)
CHLORIDE SERPL-SCNC: 101 MMOL/L — SIGNIFICANT CHANGE UP (ref 98–107)
CO2 SERPL-SCNC: 22 MMOL/L — SIGNIFICANT CHANGE UP (ref 22–31)
CREAT SERPL-MCNC: 2.45 MG/DL — HIGH (ref 0.5–1.3)
GLUCOSE BLDC GLUCOMTR-MCNC: 123 MG/DL — HIGH (ref 70–99)
GLUCOSE BLDC GLUCOMTR-MCNC: 219 MG/DL — HIGH (ref 70–99)
GLUCOSE BLDC GLUCOMTR-MCNC: 219 MG/DL — HIGH (ref 70–99)
GLUCOSE BLDC GLUCOMTR-MCNC: 248 MG/DL — HIGH (ref 70–99)
GLUCOSE BLDC GLUCOMTR-MCNC: 248 MG/DL — HIGH (ref 70–99)
GLUCOSE SERPL-MCNC: 114 MG/DL — HIGH (ref 70–99)
HCT VFR BLD CALC: 30.6 % — LOW (ref 34.5–45)
HGB BLD-MCNC: 9.3 G/DL — LOW (ref 11.5–15.5)
MAGNESIUM SERPL-MCNC: 2.2 MG/DL — SIGNIFICANT CHANGE UP (ref 1.6–2.6)
MCHC RBC-ENTMCNC: 26.8 PG — LOW (ref 27–34)
MCHC RBC-ENTMCNC: 30.4 % — LOW (ref 32–36)
MCV RBC AUTO: 88.2 FL — SIGNIFICANT CHANGE UP (ref 80–100)
NRBC # FLD: 0 K/UL — SIGNIFICANT CHANGE UP (ref 0–0)
PHOSPHATE SERPL-MCNC: 5 MG/DL — HIGH (ref 2.5–4.5)
PLATELET # BLD AUTO: 393 K/UL — SIGNIFICANT CHANGE UP (ref 150–400)
PMV BLD: 8.6 FL — SIGNIFICANT CHANGE UP (ref 7–13)
POTASSIUM SERPL-MCNC: 3.9 MMOL/L — SIGNIFICANT CHANGE UP (ref 3.5–5.3)
POTASSIUM SERPL-SCNC: 3.9 MMOL/L — SIGNIFICANT CHANGE UP (ref 3.5–5.3)
RBC # BLD: 3.47 M/UL — LOW (ref 3.8–5.2)
RBC # FLD: 16.1 % — HIGH (ref 10.3–14.5)
SODIUM SERPL-SCNC: 141 MMOL/L — SIGNIFICANT CHANGE UP (ref 135–145)
WBC # BLD: 10.97 K/UL — HIGH (ref 3.8–10.5)
WBC # FLD AUTO: 10.97 K/UL — HIGH (ref 3.8–10.5)

## 2019-06-05 PROCEDURE — 99223 1ST HOSP IP/OBS HIGH 75: CPT

## 2019-06-05 PROCEDURE — 99233 SBSQ HOSP IP/OBS HIGH 50: CPT

## 2019-06-05 RX ORDER — BUMETANIDE 0.25 MG/ML
4 INJECTION INTRAMUSCULAR; INTRAVENOUS
Refills: 0 | Status: DISCONTINUED | OUTPATIENT
Start: 2019-06-05 | End: 2019-06-05

## 2019-06-05 RX ORDER — DEXTROSE 50 % IN WATER 50 %
25 SYRINGE (ML) INTRAVENOUS ONCE
Refills: 0 | Status: DISCONTINUED | OUTPATIENT
Start: 2019-06-05 | End: 2019-06-19

## 2019-06-05 RX ORDER — DEXTROSE 50 % IN WATER 50 %
15 SYRINGE (ML) INTRAVENOUS ONCE
Refills: 0 | Status: DISCONTINUED | OUTPATIENT
Start: 2019-06-05 | End: 2019-06-05

## 2019-06-05 RX ORDER — ISOSORBIDE DINITRATE 5 MG/1
10 TABLET ORAL THREE TIMES A DAY
Refills: 0 | Status: DISCONTINUED | OUTPATIENT
Start: 2019-06-05 | End: 2019-06-19

## 2019-06-05 RX ORDER — DOCUSATE SODIUM 100 MG
100 CAPSULE ORAL
Refills: 0 | Status: DISCONTINUED | OUTPATIENT
Start: 2019-06-05 | End: 2019-06-19

## 2019-06-05 RX ORDER — SODIUM CHLORIDE 9 MG/ML
1000 INJECTION, SOLUTION INTRAVENOUS
Refills: 0 | Status: DISCONTINUED | OUTPATIENT
Start: 2019-06-05 | End: 2019-06-05

## 2019-06-05 RX ORDER — HEPARIN SODIUM 5000 [USP'U]/ML
5000 INJECTION INTRAVENOUS; SUBCUTANEOUS EVERY 12 HOURS
Refills: 0 | Status: DISCONTINUED | OUTPATIENT
Start: 2019-06-05 | End: 2019-06-19

## 2019-06-05 RX ORDER — SENNA PLUS 8.6 MG/1
2 TABLET ORAL AT BEDTIME
Refills: 0 | Status: DISCONTINUED | OUTPATIENT
Start: 2019-06-05 | End: 2019-06-19

## 2019-06-05 RX ORDER — INSULIN LISPRO 100/ML
VIAL (ML) SUBCUTANEOUS
Refills: 0 | Status: DISCONTINUED | OUTPATIENT
Start: 2019-06-05 | End: 2019-06-14

## 2019-06-05 RX ORDER — INSULIN LISPRO 100/ML
VIAL (ML) SUBCUTANEOUS
Refills: 0 | Status: DISCONTINUED | OUTPATIENT
Start: 2019-06-05 | End: 2019-06-05

## 2019-06-05 RX ORDER — INSULIN LISPRO 100/ML
VIAL (ML) SUBCUTANEOUS AT BEDTIME
Refills: 0 | Status: DISCONTINUED | OUTPATIENT
Start: 2019-06-05 | End: 2019-06-14

## 2019-06-05 RX ORDER — GLUCAGON INJECTION, SOLUTION 0.5 MG/.1ML
1 INJECTION, SOLUTION SUBCUTANEOUS ONCE
Refills: 0 | Status: DISCONTINUED | OUTPATIENT
Start: 2019-06-05 | End: 2019-06-05

## 2019-06-05 RX ORDER — HEPARIN SODIUM 5000 [USP'U]/ML
5000 INJECTION INTRAVENOUS; SUBCUTANEOUS EVERY 8 HOURS
Refills: 0 | Status: DISCONTINUED | OUTPATIENT
Start: 2019-06-05 | End: 2019-06-05

## 2019-06-05 RX ORDER — HYDRALAZINE HCL 50 MG
20 TABLET ORAL THREE TIMES A DAY
Refills: 0 | Status: DISCONTINUED | OUTPATIENT
Start: 2019-06-05 | End: 2019-06-17

## 2019-06-05 RX ORDER — SPIRONOLACTONE 25 MG/1
25 TABLET, FILM COATED ORAL DAILY
Refills: 0 | Status: DISCONTINUED | OUTPATIENT
Start: 2019-06-06 | End: 2019-06-16

## 2019-06-05 RX ORDER — INSULIN LISPRO 100/ML
10 VIAL (ML) SUBCUTANEOUS
Qty: 0 | Refills: 0 | DISCHARGE

## 2019-06-05 RX ORDER — INSULIN LISPRO 100/ML
16 VIAL (ML) SUBCUTANEOUS
Refills: 0 | Status: DISCONTINUED | OUTPATIENT
Start: 2019-06-05 | End: 2019-06-17

## 2019-06-05 RX ORDER — INSULIN GLARGINE 100 [IU]/ML
50 INJECTION, SOLUTION SUBCUTANEOUS AT BEDTIME
Refills: 0 | Status: DISCONTINUED | OUTPATIENT
Start: 2019-06-05 | End: 2019-06-14

## 2019-06-05 RX ORDER — LEVOTHYROXINE SODIUM 125 MCG
50 TABLET ORAL DAILY
Refills: 0 | Status: DISCONTINUED | OUTPATIENT
Start: 2019-06-05 | End: 2019-06-13

## 2019-06-05 RX ORDER — DEXTROSE 50 % IN WATER 50 %
25 SYRINGE (ML) INTRAVENOUS ONCE
Refills: 0 | Status: DISCONTINUED | OUTPATIENT
Start: 2019-06-05 | End: 2019-06-05

## 2019-06-05 RX ORDER — TICAGRELOR 90 MG/1
90 TABLET ORAL
Refills: 0 | Status: DISCONTINUED | OUTPATIENT
Start: 2019-06-05 | End: 2019-06-19

## 2019-06-05 RX ORDER — DEXTROSE 50 % IN WATER 50 %
15 SYRINGE (ML) INTRAVENOUS ONCE
Refills: 0 | Status: DISCONTINUED | OUTPATIENT
Start: 2019-06-05 | End: 2019-06-19

## 2019-06-05 RX ORDER — ASPIRIN/CALCIUM CARB/MAGNESIUM 324 MG
81 TABLET ORAL DAILY
Refills: 0 | Status: DISCONTINUED | OUTPATIENT
Start: 2019-06-05 | End: 2019-06-19

## 2019-06-05 RX ORDER — INSULIN GLARGINE 100 [IU]/ML
50 INJECTION, SOLUTION SUBCUTANEOUS AT BEDTIME
Refills: 0 | Status: DISCONTINUED | OUTPATIENT
Start: 2019-06-05 | End: 2019-06-05

## 2019-06-05 RX ORDER — INSULIN LISPRO 100/ML
VIAL (ML) SUBCUTANEOUS AT BEDTIME
Refills: 0 | Status: DISCONTINUED | OUTPATIENT
Start: 2019-06-05 | End: 2019-06-05

## 2019-06-05 RX ORDER — DEXTROSE 50 % IN WATER 50 %
12.5 SYRINGE (ML) INTRAVENOUS ONCE
Refills: 0 | Status: DISCONTINUED | OUTPATIENT
Start: 2019-06-05 | End: 2019-06-19

## 2019-06-05 RX ORDER — PANTOPRAZOLE SODIUM 20 MG/1
40 TABLET, DELAYED RELEASE ORAL
Refills: 0 | Status: DISCONTINUED | OUTPATIENT
Start: 2019-06-05 | End: 2019-06-13

## 2019-06-05 RX ORDER — ATORVASTATIN CALCIUM 80 MG/1
40 TABLET, FILM COATED ORAL AT BEDTIME
Refills: 0 | Status: DISCONTINUED | OUTPATIENT
Start: 2019-06-05 | End: 2019-06-19

## 2019-06-05 RX ORDER — BUMETANIDE 0.25 MG/ML
2 INJECTION INTRAMUSCULAR; INTRAVENOUS
Qty: 0 | Refills: 0 | DISCHARGE
Start: 2019-06-05

## 2019-06-05 RX ORDER — BUMETANIDE 0.25 MG/ML
4 INJECTION INTRAMUSCULAR; INTRAVENOUS EVERY 12 HOURS
Refills: 0 | Status: DISCONTINUED | OUTPATIENT
Start: 2019-06-06 | End: 2019-06-13

## 2019-06-05 RX ORDER — DEXTROSE 50 % IN WATER 50 %
12.5 SYRINGE (ML) INTRAVENOUS ONCE
Refills: 0 | Status: DISCONTINUED | OUTPATIENT
Start: 2019-06-05 | End: 2019-06-05

## 2019-06-05 RX ORDER — MONTELUKAST 4 MG/1
10 TABLET, CHEWABLE ORAL DAILY
Refills: 0 | Status: DISCONTINUED | OUTPATIENT
Start: 2019-06-05 | End: 2019-06-19

## 2019-06-05 RX ORDER — GLUCAGON INJECTION, SOLUTION 0.5 MG/.1ML
1 INJECTION, SOLUTION SUBCUTANEOUS ONCE
Refills: 0 | Status: DISCONTINUED | OUTPATIENT
Start: 2019-06-05 | End: 2019-06-19

## 2019-06-05 RX ORDER — SODIUM CHLORIDE 9 MG/ML
1000 INJECTION, SOLUTION INTRAVENOUS
Refills: 0 | Status: DISCONTINUED | OUTPATIENT
Start: 2019-06-05 | End: 2019-06-19

## 2019-06-05 RX ADMIN — INSULIN GLARGINE 50 UNIT(S): 100 INJECTION, SOLUTION SUBCUTANEOUS at 23:37

## 2019-06-05 RX ADMIN — Medication 2: at 12:13

## 2019-06-05 RX ADMIN — Medication 100 MILLIGRAM(S): at 18:51

## 2019-06-05 RX ADMIN — BUMETANIDE 4 MILLIGRAM(S): 0.25 INJECTION INTRAMUSCULAR; INTRAVENOUS at 18:51

## 2019-06-05 RX ADMIN — Medication 100 MILLIGRAM(S): at 05:29

## 2019-06-05 RX ADMIN — Medication 16 UNIT(S): at 08:09

## 2019-06-05 RX ADMIN — Medication 2: at 18:50

## 2019-06-05 RX ADMIN — Medication 20 MILLIGRAM(S): at 05:30

## 2019-06-05 RX ADMIN — Medication 81 MILLIGRAM(S): at 12:13

## 2019-06-05 RX ADMIN — Medication 20 MILLIGRAM(S): at 14:03

## 2019-06-05 RX ADMIN — ISOSORBIDE DINITRATE 10 MILLIGRAM(S): 5 TABLET ORAL at 14:03

## 2019-06-05 RX ADMIN — PANTOPRAZOLE SODIUM 40 MILLIGRAM(S): 20 TABLET, DELAYED RELEASE ORAL at 05:30

## 2019-06-05 RX ADMIN — TICAGRELOR 90 MILLIGRAM(S): 90 TABLET ORAL at 05:30

## 2019-06-05 RX ADMIN — ISOSORBIDE DINITRATE 10 MILLIGRAM(S): 5 TABLET ORAL at 05:30

## 2019-06-05 RX ADMIN — Medication 16 UNIT(S): at 18:49

## 2019-06-05 RX ADMIN — MONTELUKAST 10 MILLIGRAM(S): 4 TABLET, CHEWABLE ORAL at 12:13

## 2019-06-05 RX ADMIN — HEPARIN SODIUM 5000 UNIT(S): 5000 INJECTION INTRAVENOUS; SUBCUTANEOUS at 05:29

## 2019-06-05 RX ADMIN — SPIRONOLACTONE 25 MILLIGRAM(S): 25 TABLET, FILM COATED ORAL at 05:30

## 2019-06-05 RX ADMIN — CHLORHEXIDINE GLUCONATE 1 APPLICATION(S): 213 SOLUTION TOPICAL at 11:30

## 2019-06-05 RX ADMIN — Medication 50 MICROGRAM(S): at 05:29

## 2019-06-05 RX ADMIN — Medication 16 UNIT(S): at 12:14

## 2019-06-05 RX ADMIN — HEPARIN SODIUM 5000 UNIT(S): 5000 INJECTION INTRAVENOUS; SUBCUTANEOUS at 18:51

## 2019-06-05 RX ADMIN — Medication 2: at 08:08

## 2019-06-05 RX ADMIN — TICAGRELOR 90 MILLIGRAM(S): 90 TABLET ORAL at 18:48

## 2019-06-05 NOTE — H&P ADULT - PROBLEM SELECTOR PLAN 2
-transferred to [facility] for mitral valve clip procedure  -cards eval in am  -c/w HF mgt as noted

## 2019-06-05 NOTE — H&P ADULT - NSHPLABSRESULTS_GEN_ALL_CORE
Labs from [facility]ILIE am personally reviewed : wbc 10.9, chronic stable anemia, plt wnl, Eyal downtrending off IV diuresis 2.57 -->2.45, hyperphos.  CXR personally reviewed pulm edema.   EKG personally reviewed

## 2019-06-05 NOTE — ED PROCEDURE NOTE - ATTENDING CONTRIBUTION TO CARE
I have participated in and supervised all key portions of the above procedures and agree with the above documentation. - Arden BORJSA

## 2019-06-05 NOTE — PROGRESS NOTE ADULT - SUBJECTIVE AND OBJECTIVE BOX
Patient is a 73y old  Female who presents with a chief complaint of SOB (01 Jun 2019 13:35)    SUBJECTIVE / OVERNIGHT EVENTS:     MEDICATIONS  (STANDING):    ICU Vital Signs Last 24 Hrs      CAPILLARY BLOOD GLUCOSE      I&O's Summary      PHYSICAL EXAM:      LABS:      CARDIAC MARKERS ( 02 Jun 2019 16:42 ) Patient is a 73y old  Female who presents with a chief complaint of SOB (01 Jun 2019 13:35)    SUBJECTIVE / OVERNIGHT EVENTS: Bumex gtt was d/shannan yesterday d/t rising Cr. Patient appears well this AM, no new complaints. No CP, no palpitations, no SOB.    MEDICATIONS  (STANDING):  aspirin  chewable 81 milliGRAM(s) Oral daily  atorvastatin 40 milliGRAM(s) Oral at bedtime  chlorhexidine 4% Liquid 1 Application(s) Topical <User Schedule>  dextrose 5%. 1000 milliLiter(s) (50 mL/Hr) IV Continuous <Continuous>  dextrose 50% Injectable 12.5 Gram(s) IV Push once  dextrose 50% Injectable 25 Gram(s) IV Push once  dextrose 50% Injectable 25 Gram(s) IV Push once  docusate sodium 100 milliGRAM(s) Oral two times a day  heparin  Injectable 5000 Unit(s) SubCutaneous every 12 hours  hydrALAZINE 20 milliGRAM(s) Oral three times a day  insulin glargine Injectable (LANTUS) 45 Unit(s) SubCutaneous at bedtime  insulin lispro (HumaLOG) corrective regimen sliding scale   SubCutaneous three times a day before meals  insulin lispro (HumaLOG) corrective regimen sliding scale   SubCutaneous at bedtime  insulin lispro Injectable (HumaLOG) 16 Unit(s) SubCutaneous three times a day before meals  isosorbide   dinitrate Tablet (ISORDIL) 10 milliGRAM(s) Oral three times a day  levothyroxine 50 MICROGram(s) Oral daily  montelukast 10 milliGRAM(s) Oral daily  pantoprazole    Tablet 40 milliGRAM(s) Oral before breakfast  senna 2 Tablet(s) Oral at bedtime  spironolactone 25 milliGRAM(s) Oral daily  ticagrelor 90 milliGRAM(s) Oral two times a day    MEDICATIONS  (PRN):  dextrose 40% Gel 15 Gram(s) Oral once PRN Blood Glucose LESS THAN 70 milliGRAM(s)/deciliter  glucagon  Injectable 1 milliGRAM(s) IntraMuscular once PRN Glucose LESS THAN 70 milligrams/deciliter    ICU Vital Signs Last 24 Hrs  T(C): 36.9 (05 Jun 2019 07:41), Max: 37 (04 Jun 2019 16:00)  T(F): 98.4 (05 Jun 2019 07:41), Max: 98.6 (04 Jun 2019 16:00)  HR: 85 (05 Jun 2019 07:41) (81 - 108)  BP: 99/44 (05 Jun 2019 07:00) (92/54 - 110/59)  BP(mean): 58 (05 Jun 2019 07:00) (57 - 74)  ABP: --  ABP(mean): --  RR: 23 (05 Jun 2019 07:41) (15 - 26)  SpO2: 98% (05 Jun 2019 07:41) (97% - 99%)    CAPILLARY BLOOD GLUCOSE    POCT Blood Glucose.: 219 mg/dL (05 Jun 2019 07:36)  POCT Blood Glucose.: 190 mg/dL (04 Jun 2019 21:05)  POCT Blood Glucose.: 170 mg/dL (04 Jun 2019 12:05)    I&O's Summary    04 Jun 2019 07:01  -  05 Jun 2019 07:00  --------------------------------------------------------  IN: 550 mL / OUT: 1080 mL / NET: -530 mL    PHYSICAL EXAM:  GENERAL: NAD, well-developed  HEAD:  Atraumatic, Normocephalic  EYES: EOMI, conjunctiva and sclera clear  NECK: Supple, No JVD  CHEST/LUNG: Mild b/l rales at bases  HEART: Regular rate and rhythm; No murmurs, rubs, or gallops  ABDOMEN: Soft, Nontender, Nondistended; Bowel sounds present  EXTREMITIES:  2+ Peripheral Pulses, No clubbing, cyanosis, or edema  NEUROLOGY: non-focal  SKIN: No rashes or lesions    LABS:  CBC Full  -  ( 05 Jun 2019 05:40 )  WBC Count : 10.97 K/uL  RBC Count : 3.47 M/uL  Hemoglobin : 9.3 g/dL  Hematocrit : 30.6 %  Platelet Count - Automated : 393 K/uL  Mean Cell Volume : 88.2 fL  Mean Cell Hemoglobin : 26.8 pg  Mean Cell Hemoglobin Concentration : 30.4 %  Auto Neutrophil # : x  Auto Lymphocyte # : x  Auto Monocyte # : x  Auto Eosinophil # : x  Auto Basophil # : x  Auto Neutrophil % : x  Auto Lymphocyte % : x  Auto Monocyte % : x  Auto Eosinophil % : x  Auto Basophil % : x    06-05    141  |  101  |  74<H>  ----------------------------<  114<H>  3.9   |  22  |  2.45<H>    Ca    10.1      05 Jun 2019 05:40  Phos  5.0     06-05  Mg     2.2     06-05      CARDIAC MARKERS ( 02 Jun 2019 16:42 )

## 2019-06-05 NOTE — H&P ADULT - PROBLEM SELECTOR PLAN 6
-s/p recent LHC on prior admission  triple vessel disease - treat medically, RHC showed RA 14, V 71/17, PA 69/20/41, PCWP 27, PA sat 39%, CO3.36, CI 2.37, SVR 1825. PVR 4.17.  -c/w aspirin, brilinta 90 bid, statin  -holding bb for AdHF

## 2019-06-05 NOTE — PROGRESS NOTE ADULT - PROBLEM SELECTOR PLAN 7
Patient with severe MR, pending mitral clip evaluation at [facility].
Patient with severe MR, pending mitral clip evaluation at [facility].

## 2019-06-05 NOTE — H&P ADULT - HISTORY OF PRESENT ILLNESS
73 F PMH HTN, T2DM, CAD s/p CABG (LIMA to LAD, SVG to OM, SVG to PDA at [facility] 2014), HFrEF (LVEF 25%) severe MR, severe pulm HTN, hypothyroidism, [facility] with sob, increased wob. Pt had a prior admission in 5/2019 for ADHF and cp s/p LHC showing TVD medically managed. On this current admission, pt was f/w CXR showing pulm edema, probnp >4k, and dyspneic, thus admitted to CCU and started on milrinone gtt and bumex gtt for ADHF. Continued on bipap. Pt had repeat TTE which showed severe MR.  Pt developed acute on chronic kidney injury presumed 2/2 IV/gtt diuresis; milrinone was d/c 6/1/19, and bumex gtt d/c 6/4/19, with renal following for cardiorenal syn/hemodynamically mediated duncan.  Pt currently on po bumex bid, aldactone qd, for maintenance diuresis and PRN bumex IV pushes. Pt was transferred to [facility] for eval for mitral clip procedure.  Currently, pt endorses ______.    Vs: 98.2, 85, 107/68, 18, 99%. 73 F PMH HTN, T2DM, CAD s/p CABG (LIMA to LAD, SVG to OM, SVG to PDA at [facility] 2014), HFrEF (LVEF 25%) severe MR, severe pulm HTN, hypothyroidism, [facility] with sob, increased wob. Pt had a prior admission in 5/2019 for ADHF and cp s/p C showing TVD medically managed. On this current admission, pt was f/w CXR showing pulm edema, probnp >4k, and dyspneic, thus admitted to CCU and started on milrinone gtt and bumex gtt for ADHF. Continued on bipap. Pt had repeat TTE which showed severe MR.  Pt developed acute on chronic kidney injury presumed 2/2 IV/gtt diuresis; milrinone was d/c 6/1/19, and bumex gtt d/c 6/4/19, with renal following for cardiorenal syn/hemodynamically mediated duncan.  Pt currently on po bumex bid, aldactone qd, for maintenance diuresis and PRN bumex IV pushes. Pt was transferred to [facility] for eval for mitral clip procedure.  Currently, pt endorses mild dyspnea, +frequent urination, +weakness. Denies cp, f/c, n/v/d, dysuria, cough.     Vs: 98.2, 85, 107/68, 18, 99%.

## 2019-06-05 NOTE — H&P ADULT - PROBLEM SELECTOR PLAN 5
-likely 2/2 cardiorenal syndrome + hemodynamically mediated from IV diuresis  -pt's baseline fluctates between 1.8 and 2.3  -monitor SCr with diuresis  -renal eval in am

## 2019-06-05 NOTE — PROGRESS NOTE ADULT - PROBLEM SELECTOR PLAN 1
CHF (congestive heart failure), acute on chronic  H/O HFrEF (LVEF 25%) severe MR, severe pulm HTN with plan to evaluate for clip at [facility].  Recent RHC 5/9/19 showed RA 14, V 71/17, PA 69/20/41, PCWP 27, PA sat 39%, CO3.36, CI 2.37, SVR 1825. PVR 4.17.   Has used Bipap in the past. Does not use home CPAP.  H/O intubation last admission.   Pt. w/ bilalateral rales, SOB   CXR showing pulmonary edema  pBNP >4000  Admitted to CCU  Heart failure core measures  TTE for LV function and WMA  Strict I/Os and standing daily weights  Continue Bumex gtt @ 0.5 mg/ hr.  Continue Lipitor 40 mg QHS  Trend BMP 2/2 diuresis and replete as needed, k>4, mg> 2  D/shannan Metolazone  Bipap 15/5 50%  hold BB at this time in the setting ADHF. CHF (congestive heart failure), acute on chronic  H/O HFrEF (LVEF 25%) severe MR, severe pulm HTN with plan to evaluate for clip at [facility].  Recent RHC 5/9/19 showed RA 14, V 71/17, PA 69/20/41, PCWP 27, PA sat 39%, CO3.36, CI 2.37, SVR 1825. PVR 4.17.   H/O intubation last admission.   Pt. w/ bilateral rales, SOB   CXR showing pulmonary edema  pBNP >4000  Admitted to CCU  Heart failure core measures  TTE for LV function and WMA  Strict I/Os and standing daily weights  Currently off bumex gtt, d/t rising Cr. Will f/u with heart failure about when to resume diuretics. Continue to monitor BMP, Mg, Phos.  Continue Lipitor 40 mg QHS  D/shannan Metolazone  hold BB at this time in the setting ADHF.

## 2019-06-05 NOTE — H&P ADULT - ATTENDING COMMENTS
Care to be assumed by Dr. Ave Herring at 8 am.  This patient was assigned to me by the hospitalist in charge; my involvement in this case has consisted of the initial history, physical, chart review, and management plan.  This patient was previously unknown to me.

## 2019-06-05 NOTE — PROGRESS NOTE ADULT - ASSESSMENT
74 y/o female with PMHx of HTN, DM, CAD s/p CABG (LIMA to LAD, SVG to OM, SVG to PDA at [facility] 2014), HFrEF (LVEF 25%) severe MR, severe pulm HTN, hypothyroidism, [facility]  MERVIN  Likely sec to CHF/renal vasocongestion   Scr 2.1 on admission   Renal function fluctuates slightly likely sec to CHF.   renal function has been relatively stable ~ 1.8-2.1 could be a new baseline  Renal function worsen 6/4 PM, bumex gtt d/c, slightly improving renal function today.   Monitor bmp  diuresing per cardio  Avoid nephrotoxic agents      CKD stage 3  baseline cr 1.5-1.8  likely sec to HTN/DM/chf  MOnitor renal function     SOB  likely sec to HF   Monitor daily weight and i/o  Diuretics per cardio  Patient with severe MR await Mitral Clip eval pending transfer to [facility]    Acidosis   MOnitor serum Co2 72 y/o female with PMHx of HTN, DM, CAD s/p CABG (LIMA to LAD, SVG to OM, SVG to PDA at [facility] 2014), HFrEF (LVEF 25%) severe MR, severe pulm HTN, hypothyroidism, [facility]  MERVIN  Likely sec to CHF/renal vasocongestion   Scr 2.1 on admission   Renal function fluctuates slightly likely sec to CHF.   Scr ~ 1.8-2.1 could be a new baseline  Renal function worsen 6/4 PM, bumex gtt d/c, slightly improving renal function today.   Monitor bmp  diuresing per cardio  Avoid nephrotoxic agents      CKD stage 3  baseline cr 1.5-1.8  likely sec to HTN/DM/chf  MOnitor renal function     SOB  likely sec to HF   Monitor daily weight and i/o  Diuretics per cardio  Patient with severe MR await Mitral Clip eval pending transfer to [facility]    Acidosis   MOnitor serum Co2

## 2019-06-05 NOTE — H&P ADULT - NSHPPHYSICALEXAM_GEN_ALL_CORE
PHYSICAL EXAM:  GENERAL: mild baseline sob, NAD, well-groomed, chronically ill appearing  HEAD:  Atraumatic, Normocephalic  EYES: EOMI, PERRLA, conjunctiva and sclera clear  ENMT: No tonsillar erythema, exudates, or enlargement; Moist mucous membranes, Good dentition, No lesions  NECK: Supple, No JVD, Normal thyroid  CHEST/LUNG: faint crackles at b/l bases,  no wheezing. no stridor.   HEART: Regular rate and rhythm; +systolic murmur. no sig LE edema.   ABDOMEN: Soft, Nontender, Nondistended; Bowel sounds present, no rebound/guarding  EXTREMITIES:  2+ Peripheral Pulses, No clubbing, cyanosis  LYMPH: No lymphadenopathy noted, no lymphangitis  SKIN: No rashes or lesions no petechiae  NERVOUS SYSTEM:  Alert & Oriented X3, Good concentration; Motor Strength 5/5 B/L upper and lower extremities

## 2019-06-05 NOTE — PROGRESS NOTE ADULT - ASSESSMENT
74 y/o female with pmhx of HTN, DM, CAD s/p CABG, HFrEF (LVEF 25%) severe MR, severe pulm HTN presents from [place] [facility] with shortness of breath, increased wob and fatigue. Admitted to CCU for management of ADHF, now pending transfer to [facility] for mitral clip evaluation.

## 2019-06-05 NOTE — H&P ADULT - ASSESSMENT
73 F PMH HTN, T2DM, CAD s/p CABG (LIMA to LAD, SVG to OM, SVG to PDA at [facility] 2014), HFrEF (LVEF 25%) severe MR, severe pulm HTN, hypothyroidism, presents from [place] [facility] to [facility] initially on 6/1/19 with sob, increased wob, f/w ADHF and severe MR, cardiorenal syndrome with duncan on ckd, now s/p IV diuresis and transferred over for eval for mitral clip procedure.

## 2019-06-05 NOTE — PROGRESS NOTE ADULT - ATTENDING COMMENTS
CXR improved. Na up to 148.  Resume low-dose BB.  Decrease Bumex to 0.5 mg/hr.  Arrange transfer to [facility] CCU.
Patient seen and examined  Agree with above assessment and plan  Patient with severe MR await Mitral Clip eval pending transfer to [facility]  Continue to monitor  Continue present meds
Patient seen and examined  Agree with above assessment and plan  Awaiting transfer to [facility]  Continue present meds
Patient seen and examined  Events overnight noted- patient with increasing Cr and Bumex gtt stopped  Doing well overnight; continue present meds  Will followup with HF regarding diuretic  Await transfer to [facility]

## 2019-06-05 NOTE — ED PROCEDURE NOTE - US CPT CODES
39997 Echocardiography Transthoracic with Image 2D (Echo/FAST)/86246 US Chest (PTX, Pleural Effussion/CHF vs COPD)

## 2019-06-05 NOTE — ED PROCEDURE NOTE - PROCEDURE ADDITIONAL DETAILS
Focused ED Transthoracic echo 66167 - indication ( shortness of breath)    Grey scale imaging obtained in four views ( parasternal long, parasternal short, apical and subxiphoid)    No pericardial effusion noted.  No evidence of cardiac tamponade  LV contractility - decreased  RV normal size.    Lungs scanned: Diffuse B-lines, bilateral small pleural effusions.  Impression: no pericardial effusion, decreasedl contractility, RV normal size. Diffuse B-lines, bilateral small pleural effusions.  - Arden BORJAS

## 2019-06-05 NOTE — H&P ADULT - NSHPSOCIALHISTORY_GEN_ALL_CORE
Social History:      Occupation: not working  Lives with: (  ) alone  ( x ) children   (  ) spouse   (  ) parents  (  ) other    Substance Use (street drugs): (  x) never used  (  ) other:  Tobacco Usage:  (  x ) never smoked   (   ) former smoker   (   ) current smoker  (     ) pack year  (        ) last cigarette date  Alcohol Usage: denies

## 2019-06-05 NOTE — PROGRESS NOTE ADULT - PROBLEM SELECTOR PROBLEM 6
VTE (venous thromboembolism)

## 2019-06-05 NOTE — PROGRESS NOTE ADULT - PROBLEM SELECTOR PLAN 2
DM2 (diabetes mellitus, type II)  Currently on lantus 40 and humalog 16 tid. Patient was on lantus 55 u at home. Will increase lantus to 45.  Check HgA1c  Consistent carbohydrate diet  Continue statin therapy DM2 (diabetes mellitus, type II)  Currently on lantus 45 and humalog 16 tid. Patient was on lantus 55 u at home. Will increase lantus to 50.  Check HgA1c  Consistent carbohydrate diet  Continue statin therapy

## 2019-06-05 NOTE — H&P ADULT - NSHPREVIEWOFSYSTEMS_GEN_ALL_CORE
REVIEW OF SYSTEMS:  CONSTITUTIONAL: +weakness. No fevers. No chills. No weight loss. Good appetite.  EYES: No visual changes. No eye pain.  ENT: No hearing difficulty. No vertigo. No dysphagia. No Sinusitis/rhinorrhea.  NECK: No pain. No stiffness/rigidity.  CARDIAC: No chest pain. No palpitations.  RESPIRATORY: No cough. +SOB. No hemoptysis.  GASTROINTESTINAL: No abdominal pain. No nausea. No vomiting. No hematemesis. No diarrhea. No constipation. No melena. No hematochezia.  GENITOURINARY: No dysuria. No frequency. No hesitancy. No hematuria.  NEUROLOGICAL: No numbness. No focal weakness. No incontinence. No headache.  BACK: No back pain.  EXTREMITIES: No lower extremity edema. Full ROM.  SKIN: No rashes. No itching. No other lesions.  PSYCHIATRIC: No depression. No anxiety. No SI/HI.  ALLERGIC: No lip swelling. No hives.  All other review of systems is negative unless indicated above.

## 2019-06-05 NOTE — PATIENT PROFILE ADULT - NSPROHMDIABETMGMTSTRAT_GEN_A_NUR
insulin therapy/blood glucose testing/medication therapy/weight management/exercise/routine screenings/diet modification

## 2019-06-05 NOTE — H&P ADULT - PROBLEM SELECTOR PLAN 1
-s/p IV diuresis on milrinone gtt + bumex gtt at [facility] CCU, now off IV diuretics 2/2 Eyal on ckd  -c/w maintenance diuresis with bumex 4 mg po bid, aldactone 25 qd  -c/w hydralazine, isordil  -off ace/arb for eyal  -holding BB for ADHF  -TTE confirmed severe LV global dysf, severe MR; mgt of severe MR below  -cards eval in am  -bipap + bumex IV pushes prn for increased wob  -strict i/o, daily wt, low salt diet

## 2019-06-05 NOTE — PROGRESS NOTE ADULT - SUBJECTIVE AND OBJECTIVE BOX
[facility]ROLOGY PRACTICE   MD RAHEEL SHAH MD ANGELA WONG, PA    TEL:  OFFICE: [phone]  DR SANTOYO CELL: [phone]  DR. NGUYEN CELL: [phone]  UMM KENDALL CELL: [phone]        Patient is a 73y old  Female who presents with a chief complaint of SOB (05 Jun 2019 07:50)      Patient seen and examined at bedside. No chest pain/sob    VITALS:  T(F): 98.4 (06-05-19 @ 07:41), Max: 98.6 (06-04-19 @ 16:00)  HR: 87 (06-05-19 @ 12:00)  BP: 109/52 (06-05-19 @ 12:00)  RR: 21 (06-05-19 @ 12:00)  SpO2: 100% (06-05-19 @ 12:00)  Wt(kg): --    06-04 @ 07:01  -  06-05 @ 07:00  --------------------------------------------------------  IN: 550 mL / OUT: 1080 mL / NET: -530 mL    06-05 @ 07:01  -  06-05 @ 12:32  --------------------------------------------------------  IN: 120 mL / OUT: 125 mL / NET: -5 mL          PHYSICAL EXAM:  Constitutional: NAD  Neck: No JVD  Respiratory: CTAB, no wheezes, rales or rhonchi  Cardiovascular: S1, S2, RRR  Gastrointestinal: BS+, soft, NT/ND  Extremities: No peripheral edema    Hospital Medications:   MEDICATIONS  (STANDING):  aspirin  chewable 81 milliGRAM(s) Oral daily  atorvastatin 40 milliGRAM(s) Oral at bedtime  chlorhexidine 4% Liquid 1 Application(s) Topical <User Schedule>  dextrose 5%. 1000 milliLiter(s) (50 mL/Hr) IV Continuous <Continuous>  dextrose 50% Injectable 12.5 Gram(s) IV Push once  dextrose 50% Injectable 25 Gram(s) IV Push once  dextrose 50% Injectable 25 Gram(s) IV Push once  docusate sodium 100 milliGRAM(s) Oral two times a day  heparin  Injectable 5000 Unit(s) SubCutaneous every 12 hours  hydrALAZINE 20 milliGRAM(s) Oral three times a day  insulin glargine Injectable (LANTUS) 45 Unit(s) SubCutaneous at bedtime  insulin lispro (HumaLOG) corrective regimen sliding scale   SubCutaneous three times a day before meals  insulin lispro (HumaLOG) corrective regimen sliding scale   SubCutaneous at bedtime  insulin lispro Injectable (HumaLOG) 16 Unit(s) SubCutaneous three times a day before meals  isosorbide   dinitrate Tablet (ISORDIL) 10 milliGRAM(s) Oral three times a day  levothyroxine 50 MICROGram(s) Oral daily  montelukast 10 milliGRAM(s) Oral daily  pantoprazole    Tablet 40 milliGRAM(s) Oral before breakfast  senna 2 Tablet(s) Oral at bedtime  spironolactone 25 milliGRAM(s) Oral daily  ticagrelor 90 milliGRAM(s) Oral two times a day      LABS:  06-05    141  |  101  |  74<H>  ----------------------------<  114<H>  3.9   |  22  |  2.45<H>    Ca    10.1      05 Jun 2019 05:40  Phos  5.0     06-05  Mg     2.2     06-05      Creatinine Trend: 2.45 <--, 2.57 <--, 2.12 <--, 1.95 <--, 1.87 <--, 1.82 <--, 1.96 <--, 2.10 <--, 2.13 <--    Phosphorus Level, Serum: 5.0 mg/dL (06-05 @ 05:40)  Phosphorus Level, Serum: 5.1 mg/dL (06-04 @ 17:01)                              9.3    10.97 )-----------( 393      ( 05 Jun 2019 05:40 )             30.6     Urine Studies:  Urinalysis - [06-01-19 @ 08:00]      Color COLORLESS / Appearance CLEAR / SG 1.009 / pH 6.0      Gluc 500 / Ketone NEGATIVE  / Bili NEGATIVE / Urobili NORMAL       Blood NEGATIVE / Protein NEGATIVE / Leuk Est NEGATIVE / Nitrite NEGATIVE      RBC  / WBC  / Hyaline  / Gran  / Sq Epi  / Non Sq Epi  / Bacteria       .4 (Ca --)      [04-25-19 @ 05:45]   --  PTH 43.55 (Ca --)      [09-10-18 @ 07:15]   --  PTH 36.60 (Ca --)      [09-08-18 @ 03:15]   --  Vitamin D (25OH) 25.9      [05-01-19 @ 04:00]  HbA1c 7.8      [06-01-19 @ 08:00]  TSH 0.75      [06-01-19 @ 08:00]  Lipid: chol 148, , HDL 35,       [06-01-19 @ 08:00]        RADIOLOGY & ADDITIONAL STUDIES:

## 2019-06-06 LAB
ALBUMIN SERPL ELPH-MCNC: 3.9 G/DL — SIGNIFICANT CHANGE UP (ref 3.3–5)
ALP SERPL-CCNC: 94 U/L — SIGNIFICANT CHANGE UP (ref 40–120)
ALT FLD-CCNC: 13 U/L — SIGNIFICANT CHANGE UP (ref 10–45)
ANION GAP SERPL CALC-SCNC: 18 MMOL/L — HIGH (ref 5–17)
AST SERPL-CCNC: 11 U/L — SIGNIFICANT CHANGE UP (ref 10–40)
BACTERIA BLD CULT: SIGNIFICANT CHANGE UP
BACTERIA BLD CULT: SIGNIFICANT CHANGE UP
BASOPHILS # BLD AUTO: 0 K/UL — SIGNIFICANT CHANGE UP (ref 0–0.2)
BASOPHILS NFR BLD AUTO: 0.1 % — SIGNIFICANT CHANGE UP (ref 0–2)
BILIRUB SERPL-MCNC: 0.3 MG/DL — SIGNIFICANT CHANGE UP (ref 0.2–1.2)
BUN SERPL-MCNC: 75 MG/DL — HIGH (ref 7–23)
CALCIUM SERPL-MCNC: 10 MG/DL — SIGNIFICANT CHANGE UP (ref 8.4–10.5)
CHLORIDE SERPL-SCNC: 100 MMOL/L — SIGNIFICANT CHANGE UP (ref 96–108)
CO2 SERPL-SCNC: 22 MMOL/L — SIGNIFICANT CHANGE UP (ref 22–31)
CREAT SERPL-MCNC: 2.4 MG/DL — HIGH (ref 0.5–1.3)
EOSINOPHIL # BLD AUTO: 0.5 K/UL — SIGNIFICANT CHANGE UP (ref 0–0.5)
EOSINOPHIL NFR BLD AUTO: 5.4 % — SIGNIFICANT CHANGE UP (ref 0–6)
GLUCOSE BLDC GLUCOMTR-MCNC: 147 MG/DL — HIGH (ref 70–99)
GLUCOSE BLDC GLUCOMTR-MCNC: 174 MG/DL — HIGH (ref 70–99)
GLUCOSE BLDC GLUCOMTR-MCNC: 239 MG/DL — HIGH (ref 70–99)
GLUCOSE BLDC GLUCOMTR-MCNC: 97 MG/DL — SIGNIFICANT CHANGE UP (ref 70–99)
GLUCOSE SERPL-MCNC: 130 MG/DL — HIGH (ref 70–99)
HCT VFR BLD CALC: 28.1 % — LOW (ref 34.5–45)
HGB BLD-MCNC: 9.2 G/DL — LOW (ref 11.5–15.5)
LYMPHOCYTES # BLD AUTO: 1 K/UL — SIGNIFICANT CHANGE UP (ref 1–3.3)
LYMPHOCYTES # BLD AUTO: 11.3 % — LOW (ref 13–44)
MAGNESIUM SERPL-MCNC: 2.3 MG/DL — SIGNIFICANT CHANGE UP (ref 1.6–2.6)
MCHC RBC-ENTMCNC: 28.3 PG — SIGNIFICANT CHANGE UP (ref 27–34)
MCHC RBC-ENTMCNC: 32.6 GM/DL — SIGNIFICANT CHANGE UP (ref 32–36)
MCV RBC AUTO: 86.7 FL — SIGNIFICANT CHANGE UP (ref 80–100)
MONOCYTES # BLD AUTO: 0.8 K/UL — SIGNIFICANT CHANGE UP (ref 0–0.9)
MONOCYTES NFR BLD AUTO: 9 % — SIGNIFICANT CHANGE UP (ref 2–14)
NEUTROPHILS # BLD AUTO: 6.7 K/UL — SIGNIFICANT CHANGE UP (ref 1.8–7.4)
NEUTROPHILS NFR BLD AUTO: 74.2 % — SIGNIFICANT CHANGE UP (ref 43–77)
PHOSPHATE SERPL-MCNC: 4.4 MG/DL — SIGNIFICANT CHANGE UP (ref 2.5–4.5)
PLATELET # BLD AUTO: 412 K/UL — HIGH (ref 150–400)
POTASSIUM SERPL-MCNC: 3.7 MMOL/L — SIGNIFICANT CHANGE UP (ref 3.5–5.3)
POTASSIUM SERPL-SCNC: 3.7 MMOL/L — SIGNIFICANT CHANGE UP (ref 3.5–5.3)
PROT SERPL-MCNC: 8.3 G/DL — SIGNIFICANT CHANGE UP (ref 6–8.3)
RBC # BLD: 3.25 M/UL — LOW (ref 3.8–5.2)
RBC # FLD: 15.5 % — HIGH (ref 10.3–14.5)
SODIUM SERPL-SCNC: 140 MMOL/L — SIGNIFICANT CHANGE UP (ref 135–145)
WBC # BLD: 9.1 K/UL — SIGNIFICANT CHANGE UP (ref 3.8–10.5)
WBC # FLD AUTO: 9.1 K/UL — SIGNIFICANT CHANGE UP (ref 3.8–10.5)

## 2019-06-06 PROCEDURE — 93880 EXTRACRANIAL BILAT STUDY: CPT | Mod: 26

## 2019-06-06 PROCEDURE — ZZZZZ: CPT

## 2019-06-06 PROCEDURE — 99222 1ST HOSP IP/OBS MODERATE 55: CPT

## 2019-06-06 RX ORDER — POTASSIUM CHLORIDE 20 MEQ
20 PACKET (EA) ORAL ONCE
Refills: 0 | Status: COMPLETED | OUTPATIENT
Start: 2019-06-06 | End: 2019-06-06

## 2019-06-06 RX ADMIN — TICAGRELOR 90 MILLIGRAM(S): 90 TABLET ORAL at 05:47

## 2019-06-06 RX ADMIN — Medication 20 MILLIGRAM(S): at 05:47

## 2019-06-06 RX ADMIN — Medication 20 MILLIEQUIVALENT(S): at 09:25

## 2019-06-06 RX ADMIN — ATORVASTATIN CALCIUM 40 MILLIGRAM(S): 80 TABLET, FILM COATED ORAL at 21:45

## 2019-06-06 RX ADMIN — SENNA PLUS 2 TABLET(S): 8.6 TABLET ORAL at 21:45

## 2019-06-06 RX ADMIN — TICAGRELOR 90 MILLIGRAM(S): 90 TABLET ORAL at 17:00

## 2019-06-06 RX ADMIN — Medication 16 UNIT(S): at 09:25

## 2019-06-06 RX ADMIN — Medication 1: at 18:09

## 2019-06-06 RX ADMIN — MONTELUKAST 10 MILLIGRAM(S): 4 TABLET, CHEWABLE ORAL at 09:26

## 2019-06-06 RX ADMIN — Medication 20 MILLIGRAM(S): at 13:09

## 2019-06-06 RX ADMIN — Medication 16 UNIT(S): at 13:10

## 2019-06-06 RX ADMIN — Medication 81 MILLIGRAM(S): at 09:26

## 2019-06-06 RX ADMIN — HEPARIN SODIUM 5000 UNIT(S): 5000 INJECTION INTRAVENOUS; SUBCUTANEOUS at 17:00

## 2019-06-06 RX ADMIN — ISOSORBIDE DINITRATE 10 MILLIGRAM(S): 5 TABLET ORAL at 13:09

## 2019-06-06 RX ADMIN — ISOSORBIDE DINITRATE 10 MILLIGRAM(S): 5 TABLET ORAL at 05:47

## 2019-06-06 RX ADMIN — BUMETANIDE 4 MILLIGRAM(S): 0.25 INJECTION INTRAMUSCULAR; INTRAVENOUS at 17:00

## 2019-06-06 RX ADMIN — Medication 100 MILLIGRAM(S): at 05:47

## 2019-06-06 RX ADMIN — PANTOPRAZOLE SODIUM 40 MILLIGRAM(S): 20 TABLET, DELAYED RELEASE ORAL at 05:47

## 2019-06-06 RX ADMIN — Medication 100 MILLIGRAM(S): at 17:00

## 2019-06-06 RX ADMIN — INSULIN GLARGINE 50 UNIT(S): 100 INJECTION, SOLUTION SUBCUTANEOUS at 21:44

## 2019-06-06 RX ADMIN — SPIRONOLACTONE 25 MILLIGRAM(S): 25 TABLET, FILM COATED ORAL at 05:47

## 2019-06-06 RX ADMIN — BUMETANIDE 4 MILLIGRAM(S): 0.25 INJECTION INTRAMUSCULAR; INTRAVENOUS at 05:48

## 2019-06-06 RX ADMIN — HEPARIN SODIUM 5000 UNIT(S): 5000 INJECTION INTRAVENOUS; SUBCUTANEOUS at 05:48

## 2019-06-06 RX ADMIN — Medication 20 MILLIGRAM(S): at 21:45

## 2019-06-06 RX ADMIN — Medication 16 UNIT(S): at 18:09

## 2019-06-06 RX ADMIN — Medication 50 MICROGRAM(S): at 05:47

## 2019-06-06 RX ADMIN — ISOSORBIDE DINITRATE 10 MILLIGRAM(S): 5 TABLET ORAL at 21:45

## 2019-06-06 NOTE — CONSULT NOTE ADULT - SUBJECTIVE AND OBJECTIVE BOX
[facility]   MD RAHEEL SHAH MD RUORU WONG, PA    TEL:  OFFICE: [phone]  DR MUHAMMAD CELL: [phone]  JUSTEN KENDALL CELL: [phone]  DR. NGUYEN CELL: [phone]    -- INITIAL RENAL CONSULT NOTE  --------------------------------------------------------------------------------  HPI:   74 y/o female with PMHx of HTN, DM, CAD s/p CABG (LIMA to LAD, SVG to OM, SVG to PDA at [facility] 2014), HFrEF (LVEF 25%) severe MR, severe pulm HTN, hypothyroidism, admitted to [facility] for SOB, now transferred to [facility] for [facility] Clip Eval    PT follows with Dr. Muhammad for CKD management         PAST HISTORY  --------------------------------------------------------------------------------  PAST MEDICAL & SURGICAL HISTORY:  GERD (gastroesophageal reflux disease)  CAD (coronary artery disease): s/p CABG  Hypothyroidism  Hyperlipidemia  Diabetes mellitus, type 2  S/P CABG (coronary artery bypass graft)    FAMILY HISTORY:  Family history of hypertension (Sibling): sister  Family history of diabetes mellitus (Sibling): brothers/sisters    PAST SOCIAL HISTORY:    ALLERGIES & MEDICATIONS  --------------------------------------------------------------------------------  Allergies    azithromycin (Hives; Pruritus)    Intolerances      Standing Inpatient Medications  aspirin enteric coated 81 milliGRAM(s) Oral daily  atorvastatin 40 milliGRAM(s) Oral at bedtime  buMETAnide 4 milliGRAM(s) Oral every 12 hours  dextrose 5%. 1000 milliLiter(s) IV Continuous <Continuous>  dextrose 50% Injectable 12.5 Gram(s) IV Push once  dextrose 50% Injectable 25 Gram(s) IV Push once  dextrose 50% Injectable 25 Gram(s) IV Push once  docusate sodium 100 milliGRAM(s) Oral two times a day  heparin  Injectable 5000 Unit(s) SubCutaneous every 12 hours  hydrALAZINE 20 milliGRAM(s) Oral three times a day  insulin glargine Injectable (LANTUS) 50 Unit(s) SubCutaneous at bedtime  insulin lispro (HumaLOG) corrective regimen sliding scale   SubCutaneous three times a day before meals  insulin lispro (HumaLOG) corrective regimen sliding scale   SubCutaneous at bedtime  insulin lispro Injectable (HumaLOG) 16 Unit(s) SubCutaneous three times a day before meals  isosorbide   dinitrate Tablet (ISORDIL) 10 milliGRAM(s) Oral three times a day  levothyroxine 50 MICROGram(s) Oral daily  montelukast 10 milliGRAM(s) Oral daily  pantoprazole    Tablet 40 milliGRAM(s) Oral before breakfast  senna 2 Tablet(s) Oral at bedtime  spironolactone 25 milliGRAM(s) Oral daily  ticagrelor 90 milliGRAM(s) Oral two times a day    PRN Inpatient Medications  dextrose 40% Gel 15 Gram(s) Oral once PRN  glucagon  Injectable 1 milliGRAM(s) IntraMuscular once PRN      REVIEW OF SYSTEMS  --------------------------------------------------------------------------------  Gen: No fevers/chills  Skin: No rashes  Head/Eyes/Ears: Normal hearing,  Normal vision   Respiratory: + dyspnea, cough  CV: No chest pain  GI: No abdominal pain, diarrhea, constipation, nausea, vomiting  : No dysuria, hematuria  MSK: No  edema  Heme: No easy bruising or bleeding  Psych: No significant depression    All other systems were reviewed and are negative, except as noted.    VITALS/PHYSICAL EXAM  --------------------------------------------------------------------------------  T(C): 36.5 (06-06-19 @ 12:22), Max: 37.1 (06-05-19 @ 20:00)  HR: 77 (06-06-19 @ 12:22) (77 - 98)  BP: 100/60 (06-06-19 @ 12:22) (94/50 - 119/47)  RR: 18 (06-06-19 @ 12:22) (15 - 26)  SpO2: 100% (06-06-19 @ 12:22) (96% - 100%)  Wt(kg): --  Height (cm): 149.86 (06-05-19 @ 20:55)  Weight (kg): 48.5 (06-05-19 @ 20:55)  BMI (kg/m2): 21.6 (06-05-19 @ 20:55)  BSA (m2): 1.41 (06-05-19 @ 20:55)      06-05-19 @ 07:01  -  06-06-19 @ 07:00  --------------------------------------------------------  IN: 60 mL / OUT: 400 mL / NET: -340 mL    06-06-19 @ 07:01  -  06-06-19 @ 12:41  --------------------------------------------------------  IN: 300 mL / OUT: 200 mL / NET: 100 mL      Physical Exam:  	Gen: NAD  	HEENT: MMM  	Pulm: CTA B/L  	CV: S1S2  	Abd: Soft, +BS   	Ext: No LE edema B/L  	Neuro: Awake  	Skin: Warm and dry  	 no polo    LABS/STUDIES  --------------------------------------------------------------------------------              9.2    9.1   >-----------<  412      [06-06-19 @ 06:15]              28.1     140  |  100  |  75  ----------------------------<  130      [06-06-19 @ 06:15]  3.7   |  22  |  2.40        Ca     10.0     [06-06-19 @ 06:15]      Mg     2.3     [06-06-19 @ 06:15]      Phos  4.4     [06-06-19 @ 06:15]    TPro  8.3  /  Alb  3.9  /  TBili  0.3  /  DBili  x   /  AST  11  /  ALT  13  /  AlkPhos  94  [06-06-19 @ 06:15]          Creatinine Trend:  SCr 2.40 [06-06 @ 06:15]  SCr 2.45 [06-05 @ 05:40]  SCr 2.57 [06-04 @ 17:01]  SCr 2.12 [06-04 @ 03:50]  SCr 1.95 [06-03 @ 04:45]    Urinalysis - [06-01-19 @ 08:00]      Color COLORLESS / Appearance CLEAR / SG 1.009 / pH 6.0      Gluc 500 / Ketone NEGATIVE  / Bili NEGATIVE / Urobili NORMAL       Blood NEGATIVE / Protein NEGATIVE / Leuk Est NEGATIVE / Nitrite NEGATIVE      RBC  / WBC  / Hyaline  / Gran  / Sq Epi  / Non Sq Epi  / Bacteria       .4 (Ca --)      [04-25-19 @ 05:45]   --  PTH 43.55 (Ca --)      [09-10-18 @ 07:15]   --  PTH 36.60 (Ca --)      [09-08-18 @ 03:15]   --  Vitamin D (25OH) 25.9      [05-01-19 @ 04:00]  HbA1c 7.8      [06-01-19 @ 08:00]  TSH 0.75      [06-01-19 @ 08:00]  Lipid: chol 148, , HDL 35,       [06-01-19 @ 08:00]    HBsAg Nonreactive      [04-01-15 @ 15:00]  HCV 0.06, Nonreactive Hepatitis C AB  S/CO Ratio                        Interpretation  < 1.00                                   Non-Reactive  1.00 - 4.99                         Weakly-Reactive  > 5.00                                Reactive  Non-Reactive: A person with a non-reactive HCV antibody  result is considered uninfected.  No further action is  needed unless recent infection is suspected.  In these  cases, consider repeat testing later to detect  seroconversion..  Weakly-Reactive: HCV antibody test is abnormal, HCV RNA  Qualitative test will follow.  Reactive: HCV antibody test is abnormal, HCV RNA  Qualitative test will follow.  Note: HCV antibody testing is performed on the [employer]   system.      [04-25-19 @ 05:45]

## 2019-06-06 NOTE — PROGRESS NOTE ADULT - ASSESSMENT
72 y/o female with pmhx of HTN, DM, CAD s/p CABG, HFrEF (LVEF 25%) severe MR, severe pulm HTN presents from [facility] re[facility] with shortness of breath, increased wob and fatigue. Admitted to CCU for management of ADHF, now pending transfer to [facility] for mitral clip evaluation.

## 2019-06-06 NOTE — PROGRESS NOTE ADULT - PROBLEM SELECTOR PLAN 1
CHF (congestive heart failure), acute on chronic  H/O HFrEF (LVEF 25%) severe MR, severe pulm HTN [facility].  Recent RHC 5/9/19 showed RA 14, V 71/17, PA 69/20/41, PCWP 27, PA sat 39%, CO3.36, CI 2.37, SVR 1825. PVR 4.17.       hold BB at this time in the setting ADHF.

## 2019-06-06 NOTE — CONSULT NOTE ADULT - SUBJECTIVE AND OBJECTIVE BOX
Structural Heart Team    73 F PMH HTN, T2DM, CAD s/p CABG (LIMA to LAD, SVG to OM, SVG to PDA at [facility] 2014), HFrEF (LVEF 25%) severe MR, severe pulm HTN, hypothyroidism, [facility] with sob, increased wob. Pt had a prior admission in 5/2019 for ADHF and cp s/p LHC showing TVD medically managed. On this current admission, pt was f/w CXR showing pulm edema, probnp >4k, and dyspneic, thus admitted to CCU and started on milrinone gtt and bumex gtt for ADHF. Continued on bipap. Pt had repeat TTE which showed severe MR.  Pt developed acute on chronic kidney injury presumed 2/2 IV/gtt diuresis; milrinone was d/c 6/1/19, and bumex gtt d/c 6/4/19, with renal following for cardiorenal syn/hemodynamically mediated duncan.  Pt currently on po bumex bid, aldactone qd, for maintenance diuresis and PRN bumex IV pushes. Pt was transferred to [facility] for eval for mitral clip procedure.       Ms Gamino is seen and examined lying in bed with nasal cannula in place.  She has no complaints and says she is comfortable.  She currently denies chest pain and shortness of breath.       Allergies    azithromycin (Hives; Pruritus)    Intolerances      PAST MEDICAL & SURGICAL HISTORY:  GERD (gastroesophageal reflux disease)  CAD (coronary artery disease): s/p CABG  Hypothyroidism  Hyperlipidemia  Diabetes mellitus, type 2  S/P CABG (coronary artery bypass graft)    MEDICATIONS  (STANDING):  aspirin enteric coated 81 milliGRAM(s) Oral daily  atorvastatin 40 milliGRAM(s) Oral at bedtime  buMETAnide 4 milliGRAM(s) Oral every 12 hours  dextrose 5%. 1000 milliLiter(s) (50 mL/Hr) IV Continuous <Continuous>  dextrose 50% Injectable 12.5 Gram(s) IV Push once  dextrose 50% Injectable 25 Gram(s) IV Push once  dextrose 50% Injectable 25 Gram(s) IV Push once  docusate sodium 100 milliGRAM(s) Oral two times a day  heparin  Injectable 5000 Unit(s) SubCutaneous every 12 hours  hydrALAZINE 20 milliGRAM(s) Oral three times a day  insulin glargine Injectable (LANTUS) 50 Unit(s) SubCutaneous at bedtime  insulin lispro (HumaLOG) corrective regimen sliding scale   SubCutaneous three times a day before meals  insulin lispro (HumaLOG) corrective regimen sliding scale   SubCutaneous at bedtime  insulin lispro Injectable (HumaLOG) 16 Unit(s) SubCutaneous three times a day before meals  isosorbide   dinitrate Tablet (ISORDIL) 10 milliGRAM(s) Oral three times a day  levothyroxine 50 MICROGram(s) Oral daily  montelukast 10 milliGRAM(s) Oral daily  pantoprazole    Tablet 40 milliGRAM(s) Oral before breakfast  senna 2 Tablet(s) Oral at bedtime  spironolactone 25 milliGRAM(s) Oral daily  ticagrelor 90 milliGRAM(s) Oral two times a day      REVIEW OF SYSTEMS:    CONSTITUTIONAL: No weakness, fevers or chills  EYES/ENT: No visual changes;  No vertigo or throat pain   NECK: No pain or stiffness  RESPIRATORY: No cough, wheezing, hemoptysis; No shortness of breath  CARDIOVASCULAR: No chest pain or palpitations  GASTROINTESTINAL: No abdominal or epigastric pain. No nausea, vomiting, or hematemesis; No diarrhea or constipation. No melena or hematochezia.  GENITOURINARY: No dysuria, frequency or hematuria  NEUROLOGICAL: No numbness or weakness  SKIN: No itching, rashes      Vital Signs Last 24 Hrs  T(C): 36.5 (06 Jun 2019 12:22), Max: 37.1 (05 Jun 2019 20:00)  T(F): 97.7 (06 Jun 2019 12:22), Max: 98.7 (05 Jun 2019 20:00)  HR: 77 (06 Jun 2019 12:22) (77 - 91)  BP: 100/60 (06 Jun 2019 12:22) (100/60 - 119/47)  BP(mean): 66 (05 Jun 2019 20:00) (63 - 66)  RR: 18 (06 Jun 2019 12:22) (15 - 23)  SpO2: 100% (06 Jun 2019 12:22) (96% - 100%)                          9.2    9.1   )-----------( 412      ( 06 Jun 2019 06:15 )             28.1   06-06    140  |  100  |  75<H>  ----------------------------<  130<H>  3.7   |  22  |  2.40<H>    Ca    10.0      06 Jun 2019 06:15  Phos  4.4     06-06  Mg     2.3     06-06    TPro  8.3  /  Alb  3.9  /  TBili  0.3  /  DBili  x   /  AST  11  /  ALT  13  /  AlkPhos  94  06-06        I&O's Summary    05 Jun 2019 07:01  -  06 Jun 2019 07:00  --------------------------------------------------------  IN: 60 mL / OUT: 400 mL / NET: -340 mL    06 Jun 2019 07:01  -  06 Jun 2019 17:19  --------------------------------------------------------  IN: 530 mL / OUT: 400 mL / NET: 130 mL          Physical Exam  General: NAD, frail  Cardiac: s1s2, RRR, II/VI systolic murmur  Pulmonary: clear, no wheezes, rales, or rhonchi, good effort  Gastrointestinal: soft abdomen, nontender, nondistended, + bowel sounds throughout  Extremities: no edema, 1+ DP and PT pulses b/l  Neuro: A&Ox3, nonfocal      < from: Transesophageal Echocardiogram w/o TTE (05.01.19 @ 18:25) >  CONCLUSIONS:  1. Mitral annular calcification and calcified mitral  leaflets which are tethered. The posterior mitral leaflet  is particularly tethered.  Severe mitral regurgitation  which originates along the coaptation line.  2. Calcified trileaflet aortic valve with normal opening.  3. Left atrial enlargement. Left atrial appendage could not  be visualized, unclear if it was ligated during prior CABG.    4. Left ventricular enlargement.  5. Severe  left ventricular systolic dysfunction.  6. Right ventricular enlargement with decreased right  ventricular systolic function.  7. Agitated saline injection demonstrates evidence of a  patent foramen ovale with left to right flow.  ADDENDUM 5/3/2019: Systolic flow reversal seen in the right  upper pulmonary vein. Technically very difficult study.    < end of copied text >    < from: Cardiac Cath Lab - Adult (05.09.19 @ 17:37) >  HEMODYNAMICS: There is severe pulmonary hypertension.  VENTRICLES: No left ventriculogram was performed.  CORONARY VESSELS: The coronary circulation is right dominant.  LM:   --  Distal left main: There was a 0 % stenosis at the site of a prior  stent.  LAD:   --  Proximal LAD: There was a tubular 70 % stenosis. There was PARUL  grade 3 flow through the vessel (brisk flow).  --  Mid LAD: There was a 100 % stenosis. There was PARUL grade 0 flow  through the vessel (no flow).  CX:   --  Circumflex: Angiography showed minor luminal irregularities with  no flow limiting lesions.  RI:   --  Ostial ramus intermedius: The distal vessel was supplied by  collaterals from the LAD. There was a 100 % stenosis at the site of a  prior stent. There was PARUL grade 0 flow through the vessel (no flow).  RCA:   --  Mid RCA: The distal vessel was supplied by collaterals from  septal branches of LAD. There was a 100 % stenosis just after RV marginal  2. There was PARUL grade 0 flow through the vessel (no flow).  GRAFTS:   --  Graft to the mid LAD: The graft was a LIMA. It was normal.  COMPLICATIONS: There were no complications.  DIAGNOSTIC RECOMMENDATIONS: Patient management should include close  monitoring of BUN and creatinine. Medical management is recommended.  Prepared and signed by  Tor Prado M.D.    < end of copied text >

## 2019-06-07 LAB
ANION GAP SERPL CALC-SCNC: 17 MMOL/L — SIGNIFICANT CHANGE UP (ref 5–17)
BUN SERPL-MCNC: 75 MG/DL — HIGH (ref 7–23)
CALCIUM SERPL-MCNC: 10 MG/DL — SIGNIFICANT CHANGE UP (ref 8.4–10.5)
CHLORIDE SERPL-SCNC: 100 MMOL/L — SIGNIFICANT CHANGE UP (ref 96–108)
CO2 SERPL-SCNC: 20 MMOL/L — LOW (ref 22–31)
CREAT SERPL-MCNC: 2.34 MG/DL — HIGH (ref 0.5–1.3)
GLUCOSE BLDC GLUCOMTR-MCNC: 164 MG/DL — HIGH (ref 70–99)
GLUCOSE BLDC GLUCOMTR-MCNC: 207 MG/DL — HIGH (ref 70–99)
GLUCOSE BLDC GLUCOMTR-MCNC: 289 MG/DL — HIGH (ref 70–99)
GLUCOSE BLDC GLUCOMTR-MCNC: 295 MG/DL — HIGH (ref 70–99)
GLUCOSE SERPL-MCNC: 173 MG/DL — HIGH (ref 70–99)
MAGNESIUM SERPL-MCNC: 2.2 MG/DL — SIGNIFICANT CHANGE UP (ref 1.6–2.6)
PHOSPHATE SERPL-MCNC: 3.5 MG/DL — SIGNIFICANT CHANGE UP (ref 2.5–4.5)
POTASSIUM SERPL-MCNC: 3.9 MMOL/L — SIGNIFICANT CHANGE UP (ref 3.5–5.3)
POTASSIUM SERPL-SCNC: 3.9 MMOL/L — SIGNIFICANT CHANGE UP (ref 3.5–5.3)
SODIUM SERPL-SCNC: 137 MMOL/L — SIGNIFICANT CHANGE UP (ref 135–145)

## 2019-06-07 PROCEDURE — 94010 BREATHING CAPACITY TEST: CPT | Mod: 26

## 2019-06-07 RX ORDER — ACETAMINOPHEN 500 MG
650 TABLET ORAL ONCE
Refills: 0 | Status: COMPLETED | OUTPATIENT
Start: 2019-06-07 | End: 2019-06-07

## 2019-06-07 RX ADMIN — PANTOPRAZOLE SODIUM 40 MILLIGRAM(S): 20 TABLET, DELAYED RELEASE ORAL at 05:21

## 2019-06-07 RX ADMIN — ISOSORBIDE DINITRATE 10 MILLIGRAM(S): 5 TABLET ORAL at 12:50

## 2019-06-07 RX ADMIN — TICAGRELOR 90 MILLIGRAM(S): 90 TABLET ORAL at 05:21

## 2019-06-07 RX ADMIN — MONTELUKAST 10 MILLIGRAM(S): 4 TABLET, CHEWABLE ORAL at 22:17

## 2019-06-07 RX ADMIN — HEPARIN SODIUM 5000 UNIT(S): 5000 INJECTION INTRAVENOUS; SUBCUTANEOUS at 18:04

## 2019-06-07 RX ADMIN — Medication 20 MILLIGRAM(S): at 12:50

## 2019-06-07 RX ADMIN — ISOSORBIDE DINITRATE 10 MILLIGRAM(S): 5 TABLET ORAL at 05:22

## 2019-06-07 RX ADMIN — ISOSORBIDE DINITRATE 10 MILLIGRAM(S): 5 TABLET ORAL at 22:17

## 2019-06-07 RX ADMIN — Medication 20 MILLIGRAM(S): at 05:21

## 2019-06-07 RX ADMIN — SENNA PLUS 2 TABLET(S): 8.6 TABLET ORAL at 22:17

## 2019-06-07 RX ADMIN — Medication 100 MILLIGRAM(S): at 18:04

## 2019-06-07 RX ADMIN — Medication 2: at 09:52

## 2019-06-07 RX ADMIN — Medication 50 MICROGRAM(S): at 05:22

## 2019-06-07 RX ADMIN — Medication 81 MILLIGRAM(S): at 12:49

## 2019-06-07 RX ADMIN — Medication 3: at 18:11

## 2019-06-07 RX ADMIN — SPIRONOLACTONE 25 MILLIGRAM(S): 25 TABLET, FILM COATED ORAL at 05:22

## 2019-06-07 RX ADMIN — Medication 16 UNIT(S): at 09:52

## 2019-06-07 RX ADMIN — INSULIN GLARGINE 50 UNIT(S): 100 INJECTION, SOLUTION SUBCUTANEOUS at 22:19

## 2019-06-07 RX ADMIN — HEPARIN SODIUM 5000 UNIT(S): 5000 INJECTION INTRAVENOUS; SUBCUTANEOUS at 05:22

## 2019-06-07 RX ADMIN — BUMETANIDE 4 MILLIGRAM(S): 0.25 INJECTION INTRAMUSCULAR; INTRAVENOUS at 05:21

## 2019-06-07 RX ADMIN — Medication 3: at 13:12

## 2019-06-07 RX ADMIN — Medication 16 UNIT(S): at 13:12

## 2019-06-07 RX ADMIN — Medication 20 MILLIGRAM(S): at 22:17

## 2019-06-07 RX ADMIN — Medication 16 UNIT(S): at 18:11

## 2019-06-07 RX ADMIN — ATORVASTATIN CALCIUM 40 MILLIGRAM(S): 80 TABLET, FILM COATED ORAL at 22:17

## 2019-06-07 RX ADMIN — TICAGRELOR 90 MILLIGRAM(S): 90 TABLET ORAL at 18:04

## 2019-06-07 RX ADMIN — Medication 100 MILLIGRAM(S): at 05:21

## 2019-06-07 RX ADMIN — BUMETANIDE 4 MILLIGRAM(S): 0.25 INJECTION INTRAMUSCULAR; INTRAVENOUS at 18:04

## 2019-06-07 NOTE — PROGRESS NOTE ADULT - PROBLEM SELECTOR PLAN 1
CHF (congestive heart failure), acute on chronic  H/O HFrEF (LVEF 25%) severe MR, severe pulm HTN wtransferred [facility] clip [facility].  Recent RHC 5/9/19 showed RA 14, V 71/17, PA 69/20/41, PCWP 27, PA sat 39%, CO3.36, CI 2.37, SVR 1825. PVR 4.17.       hold BB at this time in the setting ADHF.

## 2019-06-07 NOTE — PROGRESS NOTE ADULT - ASSESSMENT
72 y/o female with pmhx of HTN, DM, CAD s/p CABG, HFrEF (LVEF 25%) severe MR, severe pulm HTN presents from [facility] with shortness of breath, increased wob and fatigue. Admitted to CCU for management of ADHF, now pending transfer to [facility] for mitral clip evaluation.

## 2019-06-07 NOTE — PHYSICAL THERAPY INITIAL EVALUATION ADULT - ADDITIONAL COMMENTS
(continuation of  H&P) TTE confirmed severe LV global dysf, severe MR; mgt of severe MR . (continuation of  H&P) TTE confirmed severe LV global dysf, severe MR; mgt of severe MR .  Pt lives in a private house with steps, + handrail.

## 2019-06-07 NOTE — PROGRESS NOTE ADULT - SUBJECTIVE AND OBJECTIVE BOX
[facility]   MD RAHEEL SHAH MD RUORU WONG, PA    TEL:  OFFICE: [phone]  DR SANTOYO CELL: [phone]  JUSTEN KENDALL CELL: [phone]  DR. NGUYEN CELL: [phone]    RENAL FOLLOW UP NOTE  --------------------------------------------------------------------------------  HPI:      Pt seen and examined at bedside.   Keira SOB, chest pain     PAST HISTORY  --------------------------------------------------------------------------------  No significant changes to PMH, PSH, FHx, SHx, unless otherwise noted    ALLERGIES & MEDICATIONS  --------------------------------------------------------------------------------  Allergies    azithromycin (Hives; Pruritus)    Intolerances      Standing Inpatient Medications  aspirin enteric coated 81 milliGRAM(s) Oral daily  atorvastatin 40 milliGRAM(s) Oral at bedtime  buMETAnide 4 milliGRAM(s) Oral every 12 hours  dextrose 5%. 1000 milliLiter(s) IV Continuous <Continuous>  dextrose 50% Injectable 12.5 Gram(s) IV Push once  dextrose 50% Injectable 25 Gram(s) IV Push once  dextrose 50% Injectable 25 Gram(s) IV Push once  docusate sodium 100 milliGRAM(s) Oral two times a day  heparin  Injectable 5000 Unit(s) SubCutaneous every 12 hours  hydrALAZINE 20 milliGRAM(s) Oral three times a day  insulin glargine Injectable (LANTUS) 50 Unit(s) SubCutaneous at bedtime  insulin lispro (HumaLOG) corrective regimen sliding scale   SubCutaneous three times a day before meals  insulin lispro (HumaLOG) corrective regimen sliding scale   SubCutaneous at bedtime  insulin lispro Injectable (HumaLOG) 16 Unit(s) SubCutaneous three times a day before meals  isosorbide   dinitrate Tablet (ISORDIL) 10 milliGRAM(s) Oral three times a day  levothyroxine 50 MICROGram(s) Oral daily  montelukast 10 milliGRAM(s) Oral daily  pantoprazole    Tablet 40 milliGRAM(s) Oral before breakfast  senna 2 Tablet(s) Oral at bedtime  spironolactone 25 milliGRAM(s) Oral daily  ticagrelor 90 milliGRAM(s) Oral two times a day    PRN Inpatient Medications  dextrose 40% Gel 15 Gram(s) Oral once PRN  glucagon  Injectable 1 milliGRAM(s) IntraMuscular once PRN      REVIEW OF SYSTEMS  --------------------------------------------------------------------------------  General: no fever  CVS: no chest pain  ABD: no abdominal pain  : no dysuria,  MSK: no edema     VITALS/PHYSICAL EXAM  --------------------------------------------------------------------------------  T(C): 36.8 (06-07-19 @ 05:24), Max: 36.9 (06-06-19 @ 20:48)  HR: 75 (06-07-19 @ 10:03) (75 - 90)  BP: 100/61 (06-07-19 @ 05:24) (100/60 - 101/61)  RR: 18 (06-07-19 @ 05:24) (18 - 18)  SpO2: 96% (06-07-19 @ 10:03) (96% - 100%)  Wt(kg): --  Height (cm): 149.86 (06-05-19 @ 20:55)  Weight (kg): 48.5 (06-05-19 @ 20:55)  BMI (kg/m2): 21.6 (06-05-19 @ 20:55)  BSA (m2): 1.41 (06-05-19 @ 20:55)      06-06-19 @ 07:01  -  06-07-19 @ 07:00  --------------------------------------------------------  IN: 770 mL / OUT: 800 mL / NET: -30 mL    06-07-19 @ 07:01  -  06-07-19 @ 12:12  --------------------------------------------------------  IN: 0 mL / OUT: 250 mL / NET: -250 mL      Physical Exam:  	Gen: NAD  	HEENT: MMM  	Pulm: CTA B/L  	CV: S1S2  	Abd: Soft, +BS  	Ext: No LE edema B/L                      Neuro: Awake   	Skin: Warm and Dry   	Gu no polo    LABS/STUDIES  --------------------------------------------------------------------------------              9.2    9.1   >-----------<  412      [06-06-19 @ 06:15]              28.1     137  |  100  |  75  ----------------------------<  173      [06-07-19 @ 06:44]  3.9   |  20  |  2.34        Ca     10.0     [06-07-19 @ 06:44]      Mg     2.2     [06-07-19 @ 06:44]      Phos  3.5     [06-07-19 @ 06:44]    TPro  8.3  /  Alb  3.9  /  TBili  0.3  /  DBili  x   /  AST  11  /  ALT  13  /  AlkPhos  94  [06-06-19 @ 06:15]          Creatinine Trend:  SCr 2.34 [06-07 @ 06:44]  SCr 2.40 [06-06 @ 06:15]  SCr 2.45 [06-05 @ 05:40]  SCr 2.57 [06-04 @ 17:01]  SCr 2.12 [06-04 @ 03:50]    Urinalysis - [06-01-19 @ 08:00]      Color COLORLESS / Appearance CLEAR / SG 1.009 / pH 6.0      Gluc 500 / Ketone NEGATIVE  / Bili NEGATIVE / Urobili NORMAL       Blood NEGATIVE / Protein NEGATIVE / Leuk Est NEGATIVE / Nitrite NEGATIVE      RBC  / WBC  / Hyaline  / Gran  / Sq Epi  / Non Sq Epi  / Bacteria       .4 (Ca --)      [04-25-19 @ 05:45]   --  PTH 43.55 (Ca --)      [09-10-18 @ 07:15]   --  PTH 36.60 (Ca --)      [09-08-18 @ 03:15]   --  Vitamin D (25OH) 25.9      [05-01-19 @ 04:00]  HbA1c 7.8      [06-01-19 @ 08:00]  TSH 0.75      [06-01-19 @ 08:00]  Lipid: chol 148, , HDL 35,       [06-01-19 @ 08:00]

## 2019-06-07 NOTE — PROGRESS NOTE ADULT - ASSESSMENT
72 y/o female with PMHx of HTN, DM, CAD s/p CABG (LIMA to LAD, SVG to OM, SVG to PDA at [facility] 2014), HFrEF (LVEF 25%) severe MR, severe pulm HTN, hypothyroidism, admitted to [facility] for SOB, now transferred to [facility] for Alina Clip Eval    MERVIN  Scr 2.4 on (6/6)   Likely sec to CHF/renal vasocongestion   Renal function fluctuates slightly likely sec to CHF.   Monitor bmp  Diuretics per cardio  IF renal function worsens, would hold off Spirinolactone   Avoid nephrotoxic agents      CKD stage 3  baseline cr 1.5-1.8  likely sec to HTN/DM/chf  MOnitor renal function     SOB  likely sec to HF   Monitor daily weight and i/o  Diuretics per cardio  Patient with severe MR await Mitral Clip eval - Follow up CTS. planned for 6/13

## 2019-06-07 NOTE — PROGRESS NOTE ADULT - SUBJECTIVE AND OBJECTIVE BOX
Patient is a 73y old  Female who presents with a chief complaint of SOB (2019 13:35)    SUBJECTIVE / OVERNIGHT EVENTS: pt denies chest pain, shortness of breath , pts family next to pts bed    MEDICATIONS  (STANDING):  aspirin enteric coated 81 milliGRAM(s) Oral daily  atorvastatin 40 milliGRAM(s) Oral at bedtime  buMETAnide 4 milliGRAM(s) Oral every 12 hours  dextrose 5%. 1000 milliLiter(s) (50 mL/Hr) IV Continuous <Continuous>  dextrose 50% Injectable 12.5 Gram(s) IV Push once  dextrose 50% Injectable 25 Gram(s) IV Push once  dextrose 50% Injectable 25 Gram(s) IV Push once  docusate sodium 100 milliGRAM(s) Oral two times a day  heparin  Injectable 5000 Unit(s) SubCutaneous every 12 hours  hydrALAZINE 20 milliGRAM(s) Oral three times a day  insulin glargine Injectable (LANTUS) 50 Unit(s) SubCutaneous at bedtime  insulin lispro (HumaLOG) corrective regimen sliding scale   SubCutaneous three times a day before meals  insulin lispro (HumaLOG) corrective regimen sliding scale   SubCutaneous at bedtime  insulin lispro Injectable (HumaLOG) 16 Unit(s) SubCutaneous three times a day before meals  isosorbide   dinitrate Tablet (ISORDIL) 10 milliGRAM(s) Oral three times a day  levothyroxine 50 MICROGram(s) Oral daily  montelukast 10 milliGRAM(s) Oral daily  pantoprazole    Tablet 40 milliGRAM(s) Oral before breakfast  senna 2 Tablet(s) Oral at bedtime  spironolactone 25 milliGRAM(s) Oral daily  ticagrelor 90 milliGRAM(s) Oral two times a day    MEDICATIONS  (PRN):  dextrose 40% Gel 15 Gram(s) Oral once PRN Blood Glucose LESS THAN 70 milliGRAM(s)/deciliter  glucagon  Injectable 1 milliGRAM(s) IntraMuscular once PRN Glucose LESS THAN 70 milligrams/deciliter    Vital Signs Last 24 Hrs  T(C): 36.5 (2019 12:05), Max: 36.9 (2019 20:48)  T(F): 97.7 (2019 12:05), Max: 98.5 (2019 20:48)  HR: 91 (2019 14:45) (75 - 91)  BP: 99/58 (2019 14:45) (99/58 - 103/61)  BP(mean): --  RR: 18 (2019 12:05) (18 - 18)  SpO2: 98% (2019 14:45) (96% - 100%)    PHYSICAL EXAM:  GENERAL: NAD, well-developed  HEAD:  Atraumatic, Normocephalic  EYES: EOMI, conjunctiva and sclera clear  NECK: Supple, No JVD  CHEST/LUNG: Mild b/l rales at bases  HEART: Regular rate and rhythm; No murmurs, rubs, or gallops  ABDOMEN: Soft, Nontender, Nondistended; Bowel sounds present  EXTREMITIES:  2+ Peripheral Pulses, No clubbing, cyanosis, or edema  NEUROLOGY: non-focal  SKIN: No rashes or lesions    LABS:      137  |  100  |  75<H>  ----------------------------<  173<H>  3.9   |  20<L>  |  2.34<H>    Ca    10.0      2019 06:44  Phos  3.5       Mg     2.2         TPro  8.3  /  Alb  3.9  /  TBili  0.3  /  DBili      /  AST  11  /  ALT  13  /  AlkPhos  94  -    Creatinine Trend: 2.34 <--, 2.40 <--, 2.45 <--, 2.57 <--, 2.12 <--, 1.95 <--, 1.87 <--, 1.82 <--, 1.96 <--, 2.10 <--, 2.13 <--                        9.2    9.1   )-----------( 412      ( 2019 06:15 )             28.1     Urine Studies:  Urinalysis Basic - ( 2019 08:00 )    Color: COLORLESS / Appearance: CLEAR / S.009 / pH: 6.0  Gluc: 500 / Ketone: NEGATIVE  / Bili: NEGATIVE / Urobili: NORMAL   Blood: NEGATIVE / Protein: NEGATIVE / Nitrite: NEGATIVE   Leuk Esterase: NEGATIVE / RBC:  / WBC    Sq Epi:  / Non Sq Epi:  / Bacteria:               LIVER FUNCTIONS - ( 2019 06:15 )  Alb: 3.9 g/dL / Pro: 8.3 g/dL / ALK PHOS: 94 U/L / ALT: 13 U/L / AST: 11 U/L / GGT: x

## 2019-06-08 LAB
ANION GAP SERPL CALC-SCNC: 19 MMOL/L — HIGH (ref 5–17)
BASOPHILS # BLD AUTO: 0 K/UL — SIGNIFICANT CHANGE UP (ref 0–0.2)
BASOPHILS NFR BLD AUTO: 0.2 % — SIGNIFICANT CHANGE UP (ref 0–2)
BUN SERPL-MCNC: 74 MG/DL — HIGH (ref 7–23)
CALCIUM SERPL-MCNC: 9.8 MG/DL — SIGNIFICANT CHANGE UP (ref 8.4–10.5)
CHLORIDE SERPL-SCNC: 97 MMOL/L — SIGNIFICANT CHANGE UP (ref 96–108)
CO2 SERPL-SCNC: 19 MMOL/L — LOW (ref 22–31)
CREAT SERPL-MCNC: 2.44 MG/DL — HIGH (ref 0.5–1.3)
EOSINOPHIL # BLD AUTO: 0.6 K/UL — HIGH (ref 0–0.5)
EOSINOPHIL NFR BLD AUTO: 7 % — HIGH (ref 0–6)
GLUCOSE BLDC GLUCOMTR-MCNC: 160 MG/DL — HIGH (ref 70–99)
GLUCOSE BLDC GLUCOMTR-MCNC: 170 MG/DL — HIGH (ref 70–99)
GLUCOSE BLDC GLUCOMTR-MCNC: 197 MG/DL — HIGH (ref 70–99)
GLUCOSE BLDC GLUCOMTR-MCNC: 210 MG/DL — HIGH (ref 70–99)
GLUCOSE SERPL-MCNC: 143 MG/DL — HIGH (ref 70–99)
HCT VFR BLD CALC: 24.3 % — LOW (ref 34.5–45)
HGB BLD-MCNC: 8.2 G/DL — LOW (ref 11.5–15.5)
LYMPHOCYTES # BLD AUTO: 1.4 K/UL — SIGNIFICANT CHANGE UP (ref 1–3.3)
LYMPHOCYTES # BLD AUTO: 15.2 % — SIGNIFICANT CHANGE UP (ref 13–44)
MCHC RBC-ENTMCNC: 28.9 PG — SIGNIFICANT CHANGE UP (ref 27–34)
MCHC RBC-ENTMCNC: 33.7 GM/DL — SIGNIFICANT CHANGE UP (ref 32–36)
MCV RBC AUTO: 85.9 FL — SIGNIFICANT CHANGE UP (ref 80–100)
MONOCYTES # BLD AUTO: 0.9 K/UL — SIGNIFICANT CHANGE UP (ref 0–0.9)
MONOCYTES NFR BLD AUTO: 10.3 % — SIGNIFICANT CHANGE UP (ref 2–14)
NEUTROPHILS # BLD AUTO: 6 K/UL — SIGNIFICANT CHANGE UP (ref 1.8–7.4)
NEUTROPHILS NFR BLD AUTO: 67.4 % — SIGNIFICANT CHANGE UP (ref 43–77)
PLATELET # BLD AUTO: 397 K/UL — SIGNIFICANT CHANGE UP (ref 150–400)
POTASSIUM SERPL-MCNC: 3.5 MMOL/L — SIGNIFICANT CHANGE UP (ref 3.5–5.3)
POTASSIUM SERPL-SCNC: 3.5 MMOL/L — SIGNIFICANT CHANGE UP (ref 3.5–5.3)
RBC # BLD: 2.83 M/UL — LOW (ref 3.8–5.2)
RBC # FLD: 15.3 % — HIGH (ref 10.3–14.5)
SODIUM SERPL-SCNC: 135 MMOL/L — SIGNIFICANT CHANGE UP (ref 135–145)
WBC # BLD: 9 K/UL — SIGNIFICANT CHANGE UP (ref 3.8–10.5)
WBC # FLD AUTO: 9 K/UL — SIGNIFICANT CHANGE UP (ref 3.8–10.5)

## 2019-06-08 RX ADMIN — ISOSORBIDE DINITRATE 10 MILLIGRAM(S): 5 TABLET ORAL at 13:01

## 2019-06-08 RX ADMIN — SENNA PLUS 2 TABLET(S): 8.6 TABLET ORAL at 21:38

## 2019-06-08 RX ADMIN — MONTELUKAST 10 MILLIGRAM(S): 4 TABLET, CHEWABLE ORAL at 21:38

## 2019-06-08 RX ADMIN — Medication 100 MILLIGRAM(S): at 06:11

## 2019-06-08 RX ADMIN — Medication 100 MILLIGRAM(S): at 18:14

## 2019-06-08 RX ADMIN — HEPARIN SODIUM 5000 UNIT(S): 5000 INJECTION INTRAVENOUS; SUBCUTANEOUS at 18:14

## 2019-06-08 RX ADMIN — Medication 81 MILLIGRAM(S): at 12:59

## 2019-06-08 RX ADMIN — ISOSORBIDE DINITRATE 10 MILLIGRAM(S): 5 TABLET ORAL at 06:11

## 2019-06-08 RX ADMIN — ATORVASTATIN CALCIUM 40 MILLIGRAM(S): 80 TABLET, FILM COATED ORAL at 21:38

## 2019-06-08 RX ADMIN — INSULIN GLARGINE 50 UNIT(S): 100 INJECTION, SOLUTION SUBCUTANEOUS at 21:39

## 2019-06-08 RX ADMIN — BUMETANIDE 4 MILLIGRAM(S): 0.25 INJECTION INTRAMUSCULAR; INTRAVENOUS at 18:13

## 2019-06-08 RX ADMIN — Medication 650 MILLIGRAM(S): at 02:23

## 2019-06-08 RX ADMIN — TICAGRELOR 90 MILLIGRAM(S): 90 TABLET ORAL at 18:14

## 2019-06-08 RX ADMIN — Medication 1: at 12:59

## 2019-06-08 RX ADMIN — Medication 1: at 18:14

## 2019-06-08 RX ADMIN — Medication 20 MILLIGRAM(S): at 06:11

## 2019-06-08 RX ADMIN — SPIRONOLACTONE 25 MILLIGRAM(S): 25 TABLET, FILM COATED ORAL at 06:11

## 2019-06-08 RX ADMIN — Medication 650 MILLIGRAM(S): at 00:07

## 2019-06-08 RX ADMIN — TICAGRELOR 90 MILLIGRAM(S): 90 TABLET ORAL at 06:11

## 2019-06-08 RX ADMIN — Medication 16 UNIT(S): at 09:02

## 2019-06-08 RX ADMIN — Medication 16 UNIT(S): at 18:14

## 2019-06-08 RX ADMIN — Medication 16 UNIT(S): at 12:59

## 2019-06-08 RX ADMIN — Medication 50 MICROGRAM(S): at 06:11

## 2019-06-08 RX ADMIN — BUMETANIDE 4 MILLIGRAM(S): 0.25 INJECTION INTRAMUSCULAR; INTRAVENOUS at 06:11

## 2019-06-08 RX ADMIN — HEPARIN SODIUM 5000 UNIT(S): 5000 INJECTION INTRAVENOUS; SUBCUTANEOUS at 06:11

## 2019-06-08 RX ADMIN — PANTOPRAZOLE SODIUM 40 MILLIGRAM(S): 20 TABLET, DELAYED RELEASE ORAL at 06:11

## 2019-06-08 RX ADMIN — ISOSORBIDE DINITRATE 10 MILLIGRAM(S): 5 TABLET ORAL at 21:38

## 2019-06-08 RX ADMIN — Medication 1: at 09:02

## 2019-06-08 RX ADMIN — Medication 20 MILLIGRAM(S): at 13:00

## 2019-06-08 RX ADMIN — Medication 20 MILLIGRAM(S): at 21:38

## 2019-06-08 NOTE — PROGRESS NOTE ADULT - SUBJECTIVE AND OBJECTIVE BOX
SELVIN CLAIRE  73y  Patient is a 73y old  Female who presents with a chief complaint of ADHF, eval for mitral clip (2019 12:12)    HPI:  73 F PMH HTN, T2DM, CAD s/p CABG (LIMA to LAD, SVG to OM, SVG to PDA at [facility] ), HFrEF (LVEF 25%) severe MR, severe pulm HTN, hypothyroidism, presents from [facility] to [facility] initially on 19 with sob, increased wob. Pt had a prior admission in 2019 for ADHF and cp s/p LHC showing TVD medically managed. Has MERVIN on CKD after diuresis for CHF.    HEALTH ISSUES - PROBLEM Dx:  Prophylactic measure: Prophylactic measure  Coronary artery disease involving native heart without angina pectoris, unspecified vessel or lesion type: Coronary artery disease involving native heart without angina pectoris, unspecified vessel or lesion type  Acute renal failure superimposed on stage 3 chronic kidney disease, unspecified acute renal failure type: Acute renal failure superimposed on stage 3 chronic kidney disease, unspecified acute renal failure type  Type 2 diabetes mellitus with hyperglycemia, with long-term current use of insulin: Type 2 diabetes mellitus with hyperglycemia, with long-term current use of insulin  Essential hypertension: Essential hypertension  Severe mitral regurgitation: Severe mitral regurgitation  Acute decompensated heart failure: Acute decompensated heart failure        MEDICATIONS  (STANDING):  aspirin enteric coated 81 milliGRAM(s) Oral daily  atorvastatin 40 milliGRAM(s) Oral at bedtime  buMETAnide 4 milliGRAM(s) Oral every 12 hours  dextrose 5%. 1000 milliLiter(s) (50 mL/Hr) IV Continuous <Continuous>  dextrose 50% Injectable 12.5 Gram(s) IV Push once  dextrose 50% Injectable 25 Gram(s) IV Push once  dextrose 50% Injectable 25 Gram(s) IV Push once  docusate sodium 100 milliGRAM(s) Oral two times a day  heparin  Injectable 5000 Unit(s) SubCutaneous every 12 hours  hydrALAZINE 20 milliGRAM(s) Oral three times a day  insulin glargine Injectable (LANTUS) 50 Unit(s) SubCutaneous at bedtime  insulin lispro (HumaLOG) corrective regimen sliding scale   SubCutaneous three times a day before meals  insulin lispro (HumaLOG) corrective regimen sliding scale   SubCutaneous at bedtime  insulin lispro Injectable (HumaLOG) 16 Unit(s) SubCutaneous three times a day before meals  isosorbide   dinitrate Tablet (ISORDIL) 10 milliGRAM(s) Oral three times a day  levothyroxine 50 MICROGram(s) Oral daily  montelukast 10 milliGRAM(s) Oral daily  pantoprazole    Tablet 40 milliGRAM(s) Oral before breakfast  senna 2 Tablet(s) Oral at bedtime  spironolactone 25 milliGRAM(s) Oral daily  ticagrelor 90 milliGRAM(s) Oral two times a day    MEDICATIONS  (PRN):  dextrose 40% Gel 15 Gram(s) Oral once PRN Blood Glucose LESS THAN 70 milliGRAM(s)/deciliter  glucagon  Injectable 1 milliGRAM(s) IntraMuscular once PRN Glucose LESS THAN 70 milligrams/deciliter    Vital Signs Last 24 Hrs  T(C): 36.5 (2019 04:45), Max: 36.5 (2019 12:05)  T(F): 97.7 (2019 04:45), Max: 97.7 (2019 12:05)  HR: 75 (2019 09:28) (74 - 91)  BP: 98/60 (2019 06:07) (96/59 - 115/69)  BP(mean): --  RR: 19 (2019 04:45) (18 - 22)  SpO2: 98% (2019 09:28) (98% - 100%)  Daily     Daily Weight in k.2 (2019 06:47)    PHYSICAL EXAM:  Constitutional: She  appears comfortable and not distressed. Not diaphoretic.    Neck:  The thyroid is normal. Trachea is midline.     Respiratory: The lungs are clear to auscultation. No dullness and expansion is normal.    Cardiovascular: S1 and S2 are normal. No mummurs, rubs or gallops are present.    Gastrointestinal: The abdomen is soft. No tenderness is present. No masses are present. Bowel sounds are normal.    Genitourinary: The bladder is not distended. No CVA tenderness is present.    Extremities: No edema is noted. No deformities are present.    Neurological: Tone, power and sensation are normal.                               8.2    9.0   )-----------( 397      ( 2019 06:08 )             24.3     06-08    135  |  97  |  74<H>  ----------------------------<  143<H>  3.5   |  19<L>  |  2.44<H>    Ca    9.8      2019 06:08  Phos  3.5     06-07  Mg     2.2     06-07

## 2019-06-08 NOTE — PROVIDER CONTACT NOTE (OTHER) - ASSESSMENT
Pt. A&Ox3-4 and Kiswahili speaking. Pt. continues to complain of some SOB and breathing quickly on bipap. Pt. denies CP, lightheadedness, dizziness, and palpitations per . VS /67, HR 80, RR 22, pulse ox 100% on 2L and 100% on bipap.

## 2019-06-09 LAB
ANION GAP SERPL CALC-SCNC: 18 MMOL/L — HIGH (ref 5–17)
BUN SERPL-MCNC: 68 MG/DL — HIGH (ref 7–23)
CALCIUM SERPL-MCNC: 10.1 MG/DL — SIGNIFICANT CHANGE UP (ref 8.4–10.5)
CHLORIDE SERPL-SCNC: 104 MMOL/L — SIGNIFICANT CHANGE UP (ref 96–108)
CO2 SERPL-SCNC: 19 MMOL/L — LOW (ref 22–31)
CREAT SERPL-MCNC: 2.25 MG/DL — HIGH (ref 0.5–1.3)
GLUCOSE BLDC GLUCOMTR-MCNC: 115 MG/DL — HIGH (ref 70–99)
GLUCOSE BLDC GLUCOMTR-MCNC: 190 MG/DL — HIGH (ref 70–99)
GLUCOSE BLDC GLUCOMTR-MCNC: 245 MG/DL — HIGH (ref 70–99)
GLUCOSE BLDC GLUCOMTR-MCNC: 94 MG/DL — SIGNIFICANT CHANGE UP (ref 70–99)
GLUCOSE SERPL-MCNC: 66 MG/DL — LOW (ref 70–99)
HCT VFR BLD CALC: 27.2 % — LOW (ref 34.5–45)
HGB BLD-MCNC: 9 G/DL — LOW (ref 11.5–15.5)
MCHC RBC-ENTMCNC: 28.6 PG — SIGNIFICANT CHANGE UP (ref 27–34)
MCHC RBC-ENTMCNC: 33.2 GM/DL — SIGNIFICANT CHANGE UP (ref 32–36)
MCV RBC AUTO: 86.1 FL — SIGNIFICANT CHANGE UP (ref 80–100)
PLATELET # BLD AUTO: 410 K/UL — HIGH (ref 150–400)
POTASSIUM SERPL-MCNC: 3.5 MMOL/L — SIGNIFICANT CHANGE UP (ref 3.5–5.3)
POTASSIUM SERPL-SCNC: 3.5 MMOL/L — SIGNIFICANT CHANGE UP (ref 3.5–5.3)
RBC # BLD: 3.16 M/UL — LOW (ref 3.8–5.2)
RBC # FLD: 15.2 % — HIGH (ref 10.3–14.5)
SODIUM SERPL-SCNC: 141 MMOL/L — SIGNIFICANT CHANGE UP (ref 135–145)
WBC # BLD: 8.8 K/UL — SIGNIFICANT CHANGE UP (ref 3.8–10.5)
WBC # FLD AUTO: 8.8 K/UL — SIGNIFICANT CHANGE UP (ref 3.8–10.5)

## 2019-06-09 RX ADMIN — SENNA PLUS 2 TABLET(S): 8.6 TABLET ORAL at 21:11

## 2019-06-09 RX ADMIN — Medication 16 UNIT(S): at 18:25

## 2019-06-09 RX ADMIN — MONTELUKAST 10 MILLIGRAM(S): 4 TABLET, CHEWABLE ORAL at 21:09

## 2019-06-09 RX ADMIN — ISOSORBIDE DINITRATE 10 MILLIGRAM(S): 5 TABLET ORAL at 13:11

## 2019-06-09 RX ADMIN — HEPARIN SODIUM 5000 UNIT(S): 5000 INJECTION INTRAVENOUS; SUBCUTANEOUS at 06:10

## 2019-06-09 RX ADMIN — Medication 1: at 18:24

## 2019-06-09 RX ADMIN — PANTOPRAZOLE SODIUM 40 MILLIGRAM(S): 20 TABLET, DELAYED RELEASE ORAL at 06:05

## 2019-06-09 RX ADMIN — Medication 50 MICROGRAM(S): at 06:05

## 2019-06-09 RX ADMIN — Medication 20 MILLIGRAM(S): at 06:05

## 2019-06-09 RX ADMIN — Medication 100 MILLIGRAM(S): at 06:05

## 2019-06-09 RX ADMIN — ISOSORBIDE DINITRATE 10 MILLIGRAM(S): 5 TABLET ORAL at 21:09

## 2019-06-09 RX ADMIN — TICAGRELOR 90 MILLIGRAM(S): 90 TABLET ORAL at 18:25

## 2019-06-09 RX ADMIN — Medication 20 MILLIGRAM(S): at 13:11

## 2019-06-09 RX ADMIN — Medication 16 UNIT(S): at 13:11

## 2019-06-09 RX ADMIN — TICAGRELOR 90 MILLIGRAM(S): 90 TABLET ORAL at 06:05

## 2019-06-09 RX ADMIN — SPIRONOLACTONE 25 MILLIGRAM(S): 25 TABLET, FILM COATED ORAL at 06:05

## 2019-06-09 RX ADMIN — ISOSORBIDE DINITRATE 10 MILLIGRAM(S): 5 TABLET ORAL at 06:05

## 2019-06-09 RX ADMIN — Medication 20 MILLIGRAM(S): at 21:09

## 2019-06-09 RX ADMIN — Medication 100 MILLIGRAM(S): at 18:25

## 2019-06-09 RX ADMIN — ATORVASTATIN CALCIUM 40 MILLIGRAM(S): 80 TABLET, FILM COATED ORAL at 21:09

## 2019-06-09 RX ADMIN — Medication 16 UNIT(S): at 09:13

## 2019-06-09 RX ADMIN — HEPARIN SODIUM 5000 UNIT(S): 5000 INJECTION INTRAVENOUS; SUBCUTANEOUS at 18:25

## 2019-06-09 RX ADMIN — INSULIN GLARGINE 50 UNIT(S): 100 INJECTION, SOLUTION SUBCUTANEOUS at 21:54

## 2019-06-09 RX ADMIN — Medication 81 MILLIGRAM(S): at 13:11

## 2019-06-09 RX ADMIN — BUMETANIDE 4 MILLIGRAM(S): 0.25 INJECTION INTRAMUSCULAR; INTRAVENOUS at 18:25

## 2019-06-09 RX ADMIN — BUMETANIDE 4 MILLIGRAM(S): 0.25 INJECTION INTRAMUSCULAR; INTRAVENOUS at 06:05

## 2019-06-09 NOTE — PROGRESS NOTE ADULT - SUBJECTIVE AND OBJECTIVE BOX
Patient is a 73y old  Female who presents with a chief complaint of SOB (01 Jun 2019 13:35)    SUBJECTIVE / OVERNIGHT EVENTS: pt denies chest pain, shortness of breath , pts family next to pts bed    MEDICATIONS  (STANDING):  aspirin enteric coated 81 milliGRAM(s) Oral daily  atorvastatin 40 milliGRAM(s) Oral at bedtime  buMETAnide 4 milliGRAM(s) Oral every 12 hours  dextrose 5%. 1000 milliLiter(s) (50 mL/Hr) IV Continuous <Continuous>  dextrose 50% Injectable 12.5 Gram(s) IV Push once  dextrose 50% Injectable 25 Gram(s) IV Push once  dextrose 50% Injectable 25 Gram(s) IV Push once  docusate sodium 100 milliGRAM(s) Oral two times a day  heparin  Injectable 5000 Unit(s) SubCutaneous every 12 hours  hydrALAZINE 20 milliGRAM(s) Oral three times a day  insulin glargine Injectable (LANTUS) 50 Unit(s) SubCutaneous at bedtime  insulin lispro (HumaLOG) corrective regimen sliding scale   SubCutaneous three times a day before meals  insulin lispro (HumaLOG) corrective regimen sliding scale   SubCutaneous at bedtime  insulin lispro Injectable (HumaLOG) 16 Unit(s) SubCutaneous three times a day before meals  isosorbide   dinitrate Tablet (ISORDIL) 10 milliGRAM(s) Oral three times a day  levothyroxine 50 MICROGram(s) Oral daily  montelukast 10 milliGRAM(s) Oral daily  pantoprazole    Tablet 40 milliGRAM(s) Oral before breakfast  senna 2 Tablet(s) Oral at bedtime  spironolactone 25 milliGRAM(s) Oral daily  ticagrelor 90 milliGRAM(s) Oral two times a day      Vital Signs Last 24 Hrs  T(C): 36.5 (09 Jun 2019 12:16), Max: 36.6 (08 Jun 2019 20:49)  T(F): 97.7 (09 Jun 2019 12:16), Max: 97.9 (08 Jun 2019 20:49)  HR: 77 (09 Jun 2019 12:16) (71 - 81)  BP: 95/56 (09 Jun 2019 12:16) (95/56 - 102/82)  BP(mean): --  RR: 18 (09 Jun 2019 12:16) (18 - 18)  SpO2: 99% (09 Jun 2019 12:16) (98% - 100%)    PHYSICAL EXAM:  GENERAL: NAD, well-developed  HEAD:  Atraumatic, Normocephalic  EYES: EOMI, conjunctiva and sclera clear  NECK: Supple, No JVD  CHEST/LUNG: Mild b/l rales at bases  HEART: Regular rate and rhythm; No murmurs, rubs, or gallops  ABDOMEN: Soft, Nontender, Nondistended; Bowel sounds present  EXTREMITIES:  2+ Peripheral Pulses, No clubbing, cyanosis, or edema  NEUROLOGY: non-focal  SKIN: No rashes or lesions    LABS:  06-09    141  |  104  |  68<H>  ----------------------------<  66<L>  3.5   |  19<L>  |  2.25<H>    Ca    10.1      09 Jun 2019 06:16      Creatinine Trend: 2.25 <--, 2.44 <--, 2.34 <--, 2.40 <--, 2.45 <--, 2.57 <--, 2.12 <--, 1.95 <--                        9.0    8.8   )-----------( 410      ( 09 Jun 2019 06:16 )             27.2     Urine Studies:                  Leuk Esterase: NEGATIVE / RBC:  / WBC    Sq Epi:  / Non Sq Epi:  / Bacteria:               LIVER FUNCTIONS - ( 06 Jun 2019 06:15 )  Alb: 3.9 g/dL / Pro: 8.3 g/dL / ALK PHOS: 94 U/L / ALT: 13 U/L / AST: 11 U/L / GGT: x

## 2019-06-09 NOTE — PROGRESS NOTE ADULT - ASSESSMENT
74 y/o female with pmhx of HTN, DM, CAD s/p CABG, HFrEF (LVEF 25%) severe MR, severe pulm HTN presents from [place] [facility] with shortness of breath, increased wob and fatigue. Admitted to CCU for management of ADHF, now pending transfer to [facility] for mitral clip evaluation.

## 2019-06-09 NOTE — PROGRESS NOTE ADULT - SUBJECTIVE AND OBJECTIVE BOX
SELVIN CLAIRE  73y  Patient is a 73y old  Female who presents with a chief complaint of ADHF, eval for mitral clip (2019 11:34)    HPI:  Advanced Heart failure, followed for MERVIN on CKD.    HEALTH ISSUES - PROBLEM Dx:  Prophylactic measure: Prophylactic measure  Coronary artery disease involving native heart without angina pectoris, unspecified vessel or lesion type: Coronary artery disease involving native heart without angina pectoris, unspecified vessel or lesion type  Acute renal failure superimposed on stage 3 chronic kidney disease, unspecified acute renal failure type: Acute renal failure superimposed on stage 3 chronic kidney disease, unspecified acute renal failure type  Type 2 diabetes mellitus with hyperglycemia, with long-term current use of insulin: Type 2 diabetes mellitus with hyperglycemia, with long-term current use of insulin  Essential hypertension: Essential hypertension  Severe mitral regurgitation: Severe mitral regurgitation  Acute decompensated heart failure: Acute decompensated heart failure        MEDICATIONS  (STANDING):  aspirin enteric coated 81 milliGRAM(s) Oral daily  atorvastatin 40 milliGRAM(s) Oral at bedtime  buMETAnide 4 milliGRAM(s) Oral every 12 hours  dextrose 5%. 1000 milliLiter(s) (50 mL/Hr) IV Continuous <Continuous>  dextrose 50% Injectable 12.5 Gram(s) IV Push once  dextrose 50% Injectable 25 Gram(s) IV Push once  dextrose 50% Injectable 25 Gram(s) IV Push once  docusate sodium 100 milliGRAM(s) Oral two times a day  heparin  Injectable 5000 Unit(s) SubCutaneous every 12 hours  hydrALAZINE 20 milliGRAM(s) Oral three times a day  insulin glargine Injectable (LANTUS) 50 Unit(s) SubCutaneous at bedtime  insulin lispro (HumaLOG) corrective regimen sliding scale   SubCutaneous three times a day before meals  insulin lispro (HumaLOG) corrective regimen sliding scale   SubCutaneous at bedtime  insulin lispro Injectable (HumaLOG) 16 Unit(s) SubCutaneous three times a day before meals  isosorbide   dinitrate Tablet (ISORDIL) 10 milliGRAM(s) Oral three times a day  levothyroxine 50 MICROGram(s) Oral daily  montelukast 10 milliGRAM(s) Oral daily  pantoprazole    Tablet 40 milliGRAM(s) Oral before breakfast  senna 2 Tablet(s) Oral at bedtime  spironolactone 25 milliGRAM(s) Oral daily  ticagrelor 90 milliGRAM(s) Oral two times a day    MEDICATIONS  (PRN):  dextrose 40% Gel 15 Gram(s) Oral once PRN Blood Glucose LESS THAN 70 milliGRAM(s)/deciliter  glucagon  Injectable 1 milliGRAM(s) IntraMuscular once PRN Glucose LESS THAN 70 milligrams/deciliter    Vital Signs Last 24 Hrs  T(C): 36.5 (2019 12:16), Max: 36.5 (2019 12:16)  T(F): 97.7 (2019 12:16), Max: 97.7 (2019 12:16)  HR: 77 (2019 12:16) (71 - 77)  BP: 95/56 (2019 12:16) (95/56 - 100/61)  BP(mean): --  RR: 18 (2019 12:16) (18 - 18)  SpO2: 99% (2019 12:16) (98% - 100%)  Daily     Daily Weight in k.5 (2019 04:12)    PHYSICAL EXAM:  Constitutional:  She appears comfortable and not distressed.     Respiratory: The lungs are clear to auscultation. No dullness and expansion is normal.    Cardiovascular: S1 and S2 are normal. No mummurs, rubs or gallops are present.    Gastrointestinal: The abdomen is soft. No tenderness is present. No masses are present. Bowel sounds are normal.    Genitourinary: The bladder is not distended. No CVA tenderness is present.    Extremities: No edema is noted. No deformities are present.    Neurological:Tone, power and sensation are normal.                             9.0    8.8   )-----------( 410      ( 2019 06:16 )             27.2     -    141  |  104  |  68<H>  ----------------------------<  66<L>  3.5   |  19<L>  |  2.25<H>    Ca    10.1      2019 06:16

## 2019-06-09 NOTE — PROGRESS NOTE ADULT - PROBLEM SELECTOR PLAN 1
CHF (congestive heart failure), acute on chronic  H/O HFrEF (LVEF 25%) severe MR, severe pulm HTN wtransferred [facility] clip [facility].  Recent RHC 5/9/19 showed RA 14, V 71/17, PA 69/20/41, PCWP 27, PA sat 39%, CO3.36, CI 2.37, SVR 1825. PVR 4.17.       hold BB at this time in the setting ADHF.

## 2019-06-09 NOTE — PROGRESS NOTE ADULT - ASSESSMENT
72 y/o female with PMHx of HTN, DM, CAD s/p CABG (LIMA to LAD, SVG to OM, SVG to PDA at [facility] 2014), HFrEF (LVEF 25%) severe MR, severe pulm HTN, hypothyroidism, admitted to [facility] for SOB, now transferred to [facility] for [facility] Clip Eval    MERVIN  Likely sec to CHF/renal vasocongestion   Renal function fluctuates slightly likely sec to CHF.   Monitor bmp  Diuretics per cardio  IF renal function worsens, would hold off Spirinolactone   Avoid nephrotoxic agents      CKD stage 3  baseline cr 1.5-1.8  likely sec to HTN/DM/chf  Monitor renal function     SOB  likely sec to HF   Monitor daily weight and i/o  Diuretics per cardio  Patient with severe MR await Mitral Clip eval - Follow up CTS. planned for 6/13

## 2019-06-10 LAB
ANION GAP SERPL CALC-SCNC: 17 MMOL/L — SIGNIFICANT CHANGE UP (ref 5–17)
BUN SERPL-MCNC: 67 MG/DL — HIGH (ref 7–23)
CALCIUM SERPL-MCNC: 9.7 MG/DL — SIGNIFICANT CHANGE UP (ref 8.4–10.5)
CHLORIDE SERPL-SCNC: 103 MMOL/L — SIGNIFICANT CHANGE UP (ref 96–108)
CO2 SERPL-SCNC: 19 MMOL/L — LOW (ref 22–31)
CREAT SERPL-MCNC: 2.18 MG/DL — HIGH (ref 0.5–1.3)
GLUCOSE BLDC GLUCOMTR-MCNC: 144 MG/DL — HIGH (ref 70–99)
GLUCOSE BLDC GLUCOMTR-MCNC: 200 MG/DL — HIGH (ref 70–99)
GLUCOSE BLDC GLUCOMTR-MCNC: 221 MG/DL — HIGH (ref 70–99)
GLUCOSE BLDC GLUCOMTR-MCNC: 249 MG/DL — HIGH (ref 70–99)
GLUCOSE SERPL-MCNC: 139 MG/DL — HIGH (ref 70–99)
POTASSIUM SERPL-MCNC: 3.6 MMOL/L — SIGNIFICANT CHANGE UP (ref 3.5–5.3)
POTASSIUM SERPL-SCNC: 3.6 MMOL/L — SIGNIFICANT CHANGE UP (ref 3.5–5.3)
SODIUM SERPL-SCNC: 139 MMOL/L — SIGNIFICANT CHANGE UP (ref 135–145)

## 2019-06-10 PROCEDURE — 99232 SBSQ HOSP IP/OBS MODERATE 35: CPT

## 2019-06-10 RX ORDER — ACETAMINOPHEN 500 MG
650 TABLET ORAL ONCE
Refills: 0 | Status: COMPLETED | OUTPATIENT
Start: 2019-06-10 | End: 2019-06-10

## 2019-06-10 RX ADMIN — Medication 20 MILLIGRAM(S): at 13:30

## 2019-06-10 RX ADMIN — MONTELUKAST 10 MILLIGRAM(S): 4 TABLET, CHEWABLE ORAL at 22:19

## 2019-06-10 RX ADMIN — HEPARIN SODIUM 5000 UNIT(S): 5000 INJECTION INTRAVENOUS; SUBCUTANEOUS at 18:34

## 2019-06-10 RX ADMIN — Medication 16 UNIT(S): at 13:30

## 2019-06-10 RX ADMIN — Medication 16 UNIT(S): at 10:10

## 2019-06-10 RX ADMIN — SPIRONOLACTONE 25 MILLIGRAM(S): 25 TABLET, FILM COATED ORAL at 05:58

## 2019-06-10 RX ADMIN — Medication 100 MILLIGRAM(S): at 05:58

## 2019-06-10 RX ADMIN — Medication 650 MILLIGRAM(S): at 21:05

## 2019-06-10 RX ADMIN — Medication 1: at 10:09

## 2019-06-10 RX ADMIN — SENNA PLUS 2 TABLET(S): 8.6 TABLET ORAL at 22:19

## 2019-06-10 RX ADMIN — TICAGRELOR 90 MILLIGRAM(S): 90 TABLET ORAL at 05:58

## 2019-06-10 RX ADMIN — ISOSORBIDE DINITRATE 10 MILLIGRAM(S): 5 TABLET ORAL at 22:19

## 2019-06-10 RX ADMIN — BUMETANIDE 4 MILLIGRAM(S): 0.25 INJECTION INTRAMUSCULAR; INTRAVENOUS at 05:58

## 2019-06-10 RX ADMIN — Medication 20 MILLIGRAM(S): at 22:19

## 2019-06-10 RX ADMIN — Medication 16 UNIT(S): at 18:30

## 2019-06-10 RX ADMIN — BUMETANIDE 4 MILLIGRAM(S): 0.25 INJECTION INTRAMUSCULAR; INTRAVENOUS at 18:33

## 2019-06-10 RX ADMIN — ISOSORBIDE DINITRATE 10 MILLIGRAM(S): 5 TABLET ORAL at 13:30

## 2019-06-10 RX ADMIN — Medication 650 MILLIGRAM(S): at 20:16

## 2019-06-10 RX ADMIN — HEPARIN SODIUM 5000 UNIT(S): 5000 INJECTION INTRAVENOUS; SUBCUTANEOUS at 05:57

## 2019-06-10 RX ADMIN — ATORVASTATIN CALCIUM 40 MILLIGRAM(S): 80 TABLET, FILM COATED ORAL at 22:18

## 2019-06-10 RX ADMIN — TICAGRELOR 90 MILLIGRAM(S): 90 TABLET ORAL at 18:31

## 2019-06-10 RX ADMIN — Medication 2: at 13:30

## 2019-06-10 RX ADMIN — Medication 50 MICROGRAM(S): at 05:58

## 2019-06-10 RX ADMIN — Medication 100 MILLIGRAM(S): at 18:32

## 2019-06-10 RX ADMIN — PANTOPRAZOLE SODIUM 40 MILLIGRAM(S): 20 TABLET, DELAYED RELEASE ORAL at 05:58

## 2019-06-10 RX ADMIN — ISOSORBIDE DINITRATE 10 MILLIGRAM(S): 5 TABLET ORAL at 05:58

## 2019-06-10 RX ADMIN — Medication 20 MILLIGRAM(S): at 05:58

## 2019-06-10 RX ADMIN — INSULIN GLARGINE 50 UNIT(S): 100 INJECTION, SOLUTION SUBCUTANEOUS at 22:19

## 2019-06-10 RX ADMIN — Medication 81 MILLIGRAM(S): at 13:29

## 2019-06-10 NOTE — PROGRESS NOTE ADULT - ASSESSMENT
72 y/o female with PMHx of HTN, DM, CAD s/p CABG (LIMA to LAD, SVG to OM, SVG to PDA at [facility] 2014), HFrEF (LVEF 25%) severe MR, severe pulm HTN, hypothyroidism, admitted to [facility] for SOB, now transferred to [facility] for [facility] Clip Eval    MERVIN  Likely sec to CHF/renal vasocongestion   Renal function fluctuates slightly likely sec to CHF.   Monitor bmp  Diuretics per cardio  IF renal function worsens, would hold off Spirinolactone   Avoid nephrotoxic agents      CKD stage 3  baseline cr 1.5-1.8  likely sec to HTN/DM/chf  Monitor renal function     SOB  likely sec to HF   Monitor daily weight and i/o  Diuretics per cardio  Patient with severe MR await Mitral Clip eval - Follow up CTS. planned for 6/13

## 2019-06-10 NOTE — PROGRESS NOTE ADULT - ASSESSMENT
72 y/o female with pmhx of HTN, DM, CAD s/p CABG, HFrEF (LVEF 25%) severe MR, severe pulm HTN presents from [place] [facility] with shortness of breath, increased wob and fatigue. Admitted to CCU for management of ADHF, now pending transfer to [facility] for mitral clip evaluation.

## 2019-06-10 NOTE — PROGRESS NOTE ADULT - SUBJECTIVE AND OBJECTIVE BOX
Structural Heart Team    Pt is sitting up in a chair and says she feels "ok", denies chest pain.  No acute events.     REVIEW OF SYSTEMS:    CONSTITUTIONAL: No weakness, fevers or chills  EYES/ENT: No visual changes;  No vertigo or throat pain   NECK: No pain or stiffness  RESPIRATORY: No cough, wheezing, hemoptysis; No shortness of breath  CARDIOVASCULAR: No chest pain or palpitations  GASTROINTESTINAL: No abdominal or epigastric pain. No nausea, vomiting, or hematemesis; No diarrhea or constipation. No melena or hematochezia.  GENITOURINARY: No dysuria, frequency or hematuria  NEUROLOGICAL: No numbness or weakness  SKIN: No itching, rashes      Allergies    azithromycin (Hives; Pruritus)    Intolerances      Vital Signs Last 24 Hrs  T(C): 36.4 (10 Yovanny 2019 12:36), Max: 36.6 (10 Yovanny 2019 04:32)  T(F): 97.6 (10 Yovanny 2019 12:36), Max: 97.8 (10 Yovanny 2019 04:32)  HR: 81 (10 Yovanny 2019 12:36) (73 - 86)  BP: 96/56 (10 Yovanny 2019 12:36) (96/56 - 115/62)  BP(mean): --  RR: 18 (10 Yovanny 2019 12:36) (18 - 18)  SpO2: 100% (10 Yovanny 2019 12:36) (98% - 100%)    MEDICATIONS  (STANDING):  aspirin enteric coated 81 milliGRAM(s) Oral daily  atorvastatin 40 milliGRAM(s) Oral at bedtime  buMETAnide 4 milliGRAM(s) Oral every 12 hours  dextrose 5%. 1000 milliLiter(s) (50 mL/Hr) IV Continuous <Continuous>  dextrose 50% Injectable 12.5 Gram(s) IV Push once  dextrose 50% Injectable 25 Gram(s) IV Push once  dextrose 50% Injectable 25 Gram(s) IV Push once  docusate sodium 100 milliGRAM(s) Oral two times a day  heparin  Injectable 5000 Unit(s) SubCutaneous every 12 hours  hydrALAZINE 20 milliGRAM(s) Oral three times a day  insulin glargine Injectable (LANTUS) 50 Unit(s) SubCutaneous at bedtime  insulin lispro (HumaLOG) corrective regimen sliding scale   SubCutaneous three times a day before meals  insulin lispro (HumaLOG) corrective regimen sliding scale   SubCutaneous at bedtime  insulin lispro Injectable (HumaLOG) 16 Unit(s) SubCutaneous three times a day before meals  isosorbide   dinitrate Tablet (ISORDIL) 10 milliGRAM(s) Oral three times a day  levothyroxine 50 MICROGram(s) Oral daily  montelukast 10 milliGRAM(s) Oral daily  pantoprazole    Tablet 40 milliGRAM(s) Oral before breakfast  senna 2 Tablet(s) Oral at bedtime  spironolactone 25 milliGRAM(s) Oral daily  ticagrelor 90 milliGRAM(s) Oral two times a day      Exam-  General: NAD, frail  Cardiac: s1s2, RRR, II/VI systolic murmur  Pulmonary: clear, no wheezes, rales, or rhonchi, good effort  Gastrointestinal: soft abdomen, nontender, nondistended, + bowel sounds throughout  Extremities: no edema, 1+ DP and PT pulses b/l  Neuro: A&Ox3, nonfocal                        9.0    8.8   )-----------( 410      ( 09 Jun 2019 06:16 )             27.2   06-10    139  |  103  |  67<H>  ----------------------------<  139<H>  3.6   |  19<L>  |  2.18<H>    Ca    9.7      10 Yovanny 2019 06:41      I&O's Summary    09 Jun 2019 07:01  -  10 Yovanny 2019 07:00  --------------------------------------------------------  IN: 1070 mL / OUT: 1100 mL / NET: -30 mL    10 Yovanny 2019 07:01  -  10 Yovanny 2019 12:59  --------------------------------------------------------  IN: 300 mL / OUT: 450 mL / NET: -150 mL              Assessment/Plan:  Ms Gamino is a 73 year old female with CAD (s/p CABGx3 in 2014), insulin controlled DM, CKD, HTN, with multiple recent admissions to [facility] for heart failure.  LV revealed severe MR with leaflet tethering and subsequent TTE showed LVEF of 30%.  She was scheduled for Structural Heart clinic but was admitted to [facility] in ADHF.  She is now transferred to [facility] for evaluation for Mitraclip  - diuresing on oral Bumex and spirinolactone  - Mitraclip evaluation is completed and pt is tentatively scheduled for Thursday  - continue to monitor creatinine, renal input appreciated    RASHIDA Le  [phone]

## 2019-06-10 NOTE — PROGRESS NOTE ADULT - SUBJECTIVE AND OBJECTIVE BOX
[facility] [place] [facility]   MD RAHEEL SHAH MD RUORU WONG, PA    TEL:  OFFICE: [phone]  DR SANTOYO CELL: [phone]  JUSTEN KENDALL CELL: [phone]  DR. NGUYEN CELL: [phone]    RENAL FOLLOW UP NOTE  --------------------------------------------------------------------------------  HPI:      Pt seen and examined at bedside.       PAST HISTORY  --------------------------------------------------------------------------------  No significant changes to PMH, PSH, FHx, SHx, unless otherwise noted    ALLERGIES & MEDICATIONS  --------------------------------------------------------------------------------  Allergies    azithromycin (Hives; Pruritus)    Intolerances      Standing Inpatient Medications  aspirin enteric coated 81 milliGRAM(s) Oral daily  atorvastatin 40 milliGRAM(s) Oral at bedtime  buMETAnide 4 milliGRAM(s) Oral every 12 hours  dextrose 5%. 1000 milliLiter(s) IV Continuous <Continuous>  dextrose 50% Injectable 12.5 Gram(s) IV Push once  dextrose 50% Injectable 25 Gram(s) IV Push once  dextrose 50% Injectable 25 Gram(s) IV Push once  docusate sodium 100 milliGRAM(s) Oral two times a day  heparin  Injectable 5000 Unit(s) SubCutaneous every 12 hours  hydrALAZINE 20 milliGRAM(s) Oral three times a day  insulin glargine Injectable (LANTUS) 50 Unit(s) SubCutaneous at bedtime  insulin lispro (HumaLOG) corrective regimen sliding scale   SubCutaneous three times a day before meals  insulin lispro (HumaLOG) corrective regimen sliding scale   SubCutaneous at bedtime  insulin lispro Injectable (HumaLOG) 16 Unit(s) SubCutaneous three times a day before meals  isosorbide   dinitrate Tablet (ISORDIL) 10 milliGRAM(s) Oral three times a day  levothyroxine 50 MICROGram(s) Oral daily  montelukast 10 milliGRAM(s) Oral daily  pantoprazole    Tablet 40 milliGRAM(s) Oral before breakfast  senna 2 Tablet(s) Oral at bedtime  spironolactone 25 milliGRAM(s) Oral daily  ticagrelor 90 milliGRAM(s) Oral two times a day    PRN Inpatient Medications  dextrose 40% Gel 15 Gram(s) Oral once PRN  glucagon  Injectable 1 milliGRAM(s) IntraMuscular once PRN      REVIEW OF SYSTEMS  --------------------------------------------------------------------------------  General: no fever  CVS: no chest pain  RESP: +  sob, no cough  ABD: no abdominal pain  MSK: no edema     VITALS/PHYSICAL EXAM  --------------------------------------------------------------------------------  T(C): 36.6 (06-10-19 @ 04:32), Max: 36.6 (06-10-19 @ 04:32)  HR: 78 (06-10-19 @ 04:32) (74 - 86)  BP: 114/67 (06-10-19 @ 04:32) (95/56 - 115/62)  RR: 18 (06-10-19 @ 04:32) (18 - 18)  SpO2: 100% (06-10-19 @ 04:32) (99% - 100%)  Wt(kg): --        06-09-19 @ 07:01  -  06-10-19 @ 07:00  --------------------------------------------------------  IN: 1070 mL / OUT: 1100 mL / NET: -30 mL    06-10-19 @ 07:01  -  06-10-19 @ 10:57  --------------------------------------------------------  IN: 0 mL / OUT: 100 mL / NET: -100 mL      Physical Exam:  	Gen: NAD  	HEENT: MMM  	Pulm: CTA B/L  	CV: S1S2  	Abd: Soft, +BS  	Ext: No LE edema B/L                      Neuro: Awake   	Skin: Warm and Dry   	Gu no polo    LABS/STUDIES  --------------------------------------------------------------------------------              9.0    8.8   >-----------<  410      [06-09-19 @ 06:16]              27.2     139  |  103  |  67  ----------------------------<  139      [06-10-19 @ 06:41]  3.6   |  19  |  2.18        Ca     9.7     [06-10-19 @ 06:41]            Creatinine Trend:  SCr 2.18 [06-10 @ 06:41]  SCr 2.25 [06-09 @ 06:16]  SCr 2.44 [06-08 @ 06:08]  SCr 2.34 [06-07 @ 06:44]  SCr 2.40 [06-06 @ 06:15]    Urinalysis - [06-01-19 @ 08:00]      Color COLORLESS / Appearance CLEAR / SG 1.009 / pH 6.0      Gluc 500 / Ketone NEGATIVE  / Bili NEGATIVE / Urobili NORMAL       Blood NEGATIVE / Protein NEGATIVE / Leuk Est NEGATIVE / Nitrite NEGATIVE      RBC  / WBC  / Hyaline  / Gran  / Sq Epi  / Non Sq Epi  / Bacteria       .4 (Ca --)      [04-25-19 @ 05:45]   --  PTH 43.55 (Ca --)      [09-10-18 @ 07:15]   --  PTH 36.60 (Ca --)      [09-08-18 @ 03:15]   --  Vitamin D (25OH) 25.9      [05-01-19 @ 04:00]  HbA1c 7.8      [06-01-19 @ 08:00]  TSH 0.75      [06-01-19 @ 08:00]  Lipid: chol 148, , HDL 35,       [06-01-19 @ 08:00]

## 2019-06-10 NOTE — PROGRESS NOTE ADULT - SUBJECTIVE AND OBJECTIVE BOX
Patient is a 73y old  Female who presents with a chief complaint of SOB (01 Jun 2019 13:35)    SUBJECTIVE / OVERNIGHT EVENTS: pt denies chest pain, shortness of breath , pts family next to pts bed    MEDICATIONS  (STANDING):  aspirin enteric coated 81 milliGRAM(s) Oral daily  atorvastatin 40 milliGRAM(s) Oral at bedtime  buMETAnide 4 milliGRAM(s) Oral every 12 hours  dextrose 5%. 1000 milliLiter(s) (50 mL/Hr) IV Continuous <Continuous>  dextrose 50% Injectable 12.5 Gram(s) IV Push once  dextrose 50% Injectable 25 Gram(s) IV Push once  dextrose 50% Injectable 25 Gram(s) IV Push once  docusate sodium 100 milliGRAM(s) Oral two times a day  heparin  Injectable 5000 Unit(s) SubCutaneous every 12 hours  hydrALAZINE 20 milliGRAM(s) Oral three times a day  insulin glargine Injectable (LANTUS) 50 Unit(s) SubCutaneous at bedtime  insulin lispro (HumaLOG) corrective regimen sliding scale   SubCutaneous three times a day before meals  insulin lispro (HumaLOG) corrective regimen sliding scale   SubCutaneous at bedtime  insulin lispro Injectable (HumaLOG) 16 Unit(s) SubCutaneous three times a day before meals  isosorbide   dinitrate Tablet (ISORDIL) 10 milliGRAM(s) Oral three times a day  levothyroxine 50 MICROGram(s) Oral daily  montelukast 10 milliGRAM(s) Oral daily  pantoprazole    Tablet 40 milliGRAM(s) Oral before breakfast  senna 2 Tablet(s) Oral at bedtime  spironolactone 25 milliGRAM(s) Oral daily  ticagrelor 90 milliGRAM(s) Oral two times a day    MEDICATIONS  (PRN):  dextrose 40% Gel 15 Gram(s) Oral once PRN Blood Glucose LESS THAN 70 milliGRAM(s)/deciliter  glucagon  Injectable 1 milliGRAM(s) IntraMuscular once PRN Glucose LESS THAN 70 milligrams/deciliter    Vital Signs Last 24 Hrs  T(C): 36.6 (10 Yovanny 2019 21:01), Max: 36.6 (10 Yovanny 2019 04:32)  T(F): 97.8 (10 Yovanny 2019 21:01), Max: 97.8 (10 Yovanny 2019 04:32)  HR: 81 (10 Yovanny 2019 21:01) (73 - 86)  BP: 101/61 (10 Yovanny 2019 21:01) (94/55 - 114/67)  BP(mean): --  RR: 18 (10 Yovanny 2019 21:01) (18 - 18)  SpO2: 98% (10 Yovanny 2019 21:01) (98% - 100%)    PHYSICAL EXAM:  GENERAL: NAD, well-developed  HEAD:  Atraumatic, Normocephalic  EYES: EOMI, conjunctiva and sclera clear  NECK: Supple, No JVD  CHEST/LUNG: Mild b/l rales at bases  HEART: Regular rate and rhythm; No murmurs, rubs, or gallops  ABDOMEN: Soft, Nontender, Nondistended; Bowel sounds present  EXTREMITIES:  2+ Peripheral Pulses, No clubbing, cyanosis, or edema  NEUROLOGY: non-focal  SKIN: No rashes or lesions    LABS:  06-10    139  |  103  |  67<H>  ----------------------------<  139<H>  3.6   |  19<L>  |  2.18<H>    Ca    9.7      10 Yovanny 2019 06:41      Creatinine Trend: 2.18 <--, 2.25 <--, 2.44 <--, 2.34 <--, 2.40 <--, 2.45 <--, 2.57 <--, 2.12 <--                        9.0    8.8   )-----------( 410      ( 09 Jun 2019 06:16 )             27.2     Urine Studies:                Sq Epi:  / Non Sq Epi:  / Bacteria:               LIVER FUNCTIONS - ( 06 Jun 2019 06:15 )  Alb: 3.9 g/dL / Pro: 8.3 g/dL / ALK PHOS: 94 U/L / ALT: 13 U/L / AST: 11 U/L / GGT: x

## 2019-06-10 NOTE — PROGRESS NOTE ADULT - PROBLEM SELECTOR PLAN 1
CHF (congestive heart failure), acute on chronic  H/O HFrEF (LVEF 25%) severe MR, severe pulm HTN wtransferred for clip at [facility].  Recent RHC 5/9/19 showed RA 14, V 71/17, PA 69/20/41, PCWP 27, PA sat 39%, CO3.36, CI 2.37, SVR 1825. PVR 4.17.       hold BB at this time in the setting ADHF.

## 2019-06-11 LAB
ANION GAP SERPL CALC-SCNC: 16 MMOL/L — SIGNIFICANT CHANGE UP (ref 5–17)
BUN SERPL-MCNC: 69 MG/DL — HIGH (ref 7–23)
CALCIUM SERPL-MCNC: 9.4 MG/DL — SIGNIFICANT CHANGE UP (ref 8.4–10.5)
CHLORIDE SERPL-SCNC: 101 MMOL/L — SIGNIFICANT CHANGE UP (ref 96–108)
CO2 SERPL-SCNC: 18 MMOL/L — LOW (ref 22–31)
CREAT SERPL-MCNC: 2.28 MG/DL — HIGH (ref 0.5–1.3)
GLUCOSE BLDC GLUCOMTR-MCNC: 123 MG/DL — HIGH (ref 70–99)
GLUCOSE BLDC GLUCOMTR-MCNC: 125 MG/DL — HIGH (ref 70–99)
GLUCOSE BLDC GLUCOMTR-MCNC: 217 MG/DL — HIGH (ref 70–99)
GLUCOSE BLDC GLUCOMTR-MCNC: 255 MG/DL — HIGH (ref 70–99)
GLUCOSE SERPL-MCNC: 301 MG/DL — HIGH (ref 70–99)
HCT VFR BLD CALC: 24.8 % — LOW (ref 34.5–45)
HGB BLD-MCNC: 8.4 G/DL — LOW (ref 11.5–15.5)
MCHC RBC-ENTMCNC: 29.1 PG — SIGNIFICANT CHANGE UP (ref 27–34)
MCHC RBC-ENTMCNC: 34 GM/DL — SIGNIFICANT CHANGE UP (ref 32–36)
MCV RBC AUTO: 85.6 FL — SIGNIFICANT CHANGE UP (ref 80–100)
PLATELET # BLD AUTO: 427 K/UL — HIGH (ref 150–400)
POTASSIUM SERPL-MCNC: 3.9 MMOL/L — SIGNIFICANT CHANGE UP (ref 3.5–5.3)
POTASSIUM SERPL-SCNC: 3.9 MMOL/L — SIGNIFICANT CHANGE UP (ref 3.5–5.3)
RBC # BLD: 2.9 M/UL — LOW (ref 3.8–5.2)
RBC # FLD: 15.5 % — HIGH (ref 10.3–14.5)
SODIUM SERPL-SCNC: 135 MMOL/L — SIGNIFICANT CHANGE UP (ref 135–145)
WBC # BLD: 9.8 K/UL — SIGNIFICANT CHANGE UP (ref 3.8–10.5)
WBC # FLD AUTO: 9.8 K/UL — SIGNIFICANT CHANGE UP (ref 3.8–10.5)

## 2019-06-11 RX ORDER — ACETAMINOPHEN 500 MG
650 TABLET ORAL ONCE
Refills: 0 | Status: COMPLETED | OUTPATIENT
Start: 2019-06-11 | End: 2019-06-11

## 2019-06-11 RX ADMIN — ATORVASTATIN CALCIUM 40 MILLIGRAM(S): 80 TABLET, FILM COATED ORAL at 22:00

## 2019-06-11 RX ADMIN — HEPARIN SODIUM 5000 UNIT(S): 5000 INJECTION INTRAVENOUS; SUBCUTANEOUS at 06:12

## 2019-06-11 RX ADMIN — MONTELUKAST 10 MILLIGRAM(S): 4 TABLET, CHEWABLE ORAL at 22:01

## 2019-06-11 RX ADMIN — Medication 20 MILLIGRAM(S): at 13:05

## 2019-06-11 RX ADMIN — ISOSORBIDE DINITRATE 10 MILLIGRAM(S): 5 TABLET ORAL at 13:05

## 2019-06-11 RX ADMIN — Medication 20 MILLIGRAM(S): at 06:12

## 2019-06-11 RX ADMIN — Medication 100 MILLIGRAM(S): at 06:12

## 2019-06-11 RX ADMIN — Medication 3: at 09:16

## 2019-06-11 RX ADMIN — BUMETANIDE 4 MILLIGRAM(S): 0.25 INJECTION INTRAMUSCULAR; INTRAVENOUS at 20:02

## 2019-06-11 RX ADMIN — INSULIN GLARGINE 50 UNIT(S): 100 INJECTION, SOLUTION SUBCUTANEOUS at 22:04

## 2019-06-11 RX ADMIN — SPIRONOLACTONE 25 MILLIGRAM(S): 25 TABLET, FILM COATED ORAL at 06:12

## 2019-06-11 RX ADMIN — Medication 50 MICROGRAM(S): at 06:12

## 2019-06-11 RX ADMIN — Medication 650 MILLIGRAM(S): at 21:59

## 2019-06-11 RX ADMIN — TICAGRELOR 90 MILLIGRAM(S): 90 TABLET ORAL at 06:12

## 2019-06-11 RX ADMIN — Medication 16 UNIT(S): at 13:45

## 2019-06-11 RX ADMIN — Medication 81 MILLIGRAM(S): at 13:05

## 2019-06-11 RX ADMIN — Medication 16 UNIT(S): at 09:16

## 2019-06-11 RX ADMIN — BUMETANIDE 4 MILLIGRAM(S): 0.25 INJECTION INTRAMUSCULAR; INTRAVENOUS at 06:12

## 2019-06-11 RX ADMIN — ISOSORBIDE DINITRATE 10 MILLIGRAM(S): 5 TABLET ORAL at 22:01

## 2019-06-11 RX ADMIN — ISOSORBIDE DINITRATE 10 MILLIGRAM(S): 5 TABLET ORAL at 06:12

## 2019-06-11 RX ADMIN — SENNA PLUS 2 TABLET(S): 8.6 TABLET ORAL at 22:01

## 2019-06-11 RX ADMIN — Medication 100 MILLIGRAM(S): at 20:02

## 2019-06-11 RX ADMIN — Medication 650 MILLIGRAM(S): at 22:30

## 2019-06-11 RX ADMIN — Medication 20 MILLIGRAM(S): at 22:00

## 2019-06-11 RX ADMIN — TICAGRELOR 90 MILLIGRAM(S): 90 TABLET ORAL at 20:08

## 2019-06-11 RX ADMIN — Medication 2: at 16:25

## 2019-06-11 RX ADMIN — Medication 16 UNIT(S): at 18:41

## 2019-06-11 RX ADMIN — HEPARIN SODIUM 5000 UNIT(S): 5000 INJECTION INTRAVENOUS; SUBCUTANEOUS at 18:40

## 2019-06-11 RX ADMIN — PANTOPRAZOLE SODIUM 40 MILLIGRAM(S): 20 TABLET, DELAYED RELEASE ORAL at 06:12

## 2019-06-11 NOTE — PROGRESS NOTE ADULT - SUBJECTIVE AND OBJECTIVE BOX
Patient is a 73y old  Female who presents with a chief complaint of SOB (01 Jun 2019 13:35)    SUBJECTIVE / OVERNIGHT EVENTS: pt denies chest pain, shortness of breath , pts family next to pts bed    MEDICATIONS  (STANDING):  aspirin enteric coated 81 milliGRAM(s) Oral daily  atorvastatin 40 milliGRAM(s) Oral at bedtime  buMETAnide 4 milliGRAM(s) Oral every 12 hours  dextrose 5%. 1000 milliLiter(s) (50 mL/Hr) IV Continuous <Continuous>  dextrose 50% Injectable 12.5 Gram(s) IV Push once  dextrose 50% Injectable 25 Gram(s) IV Push once  dextrose 50% Injectable 25 Gram(s) IV Push once  docusate sodium 100 milliGRAM(s) Oral two times a day  heparin  Injectable 5000 Unit(s) SubCutaneous every 12 hours  hydrALAZINE 20 milliGRAM(s) Oral three times a day  insulin glargine Injectable (LANTUS) 50 Unit(s) SubCutaneous at bedtime  insulin lispro (HumaLOG) corrective regimen sliding scale   SubCutaneous three times a day before meals  insulin lispro (HumaLOG) corrective regimen sliding scale   SubCutaneous at bedtime  insulin lispro Injectable (HumaLOG) 16 Unit(s) SubCutaneous three times a day before meals  isosorbide   dinitrate Tablet (ISORDIL) 10 milliGRAM(s) Oral three times a day  levothyroxine 50 MICROGram(s) Oral daily  montelukast 10 milliGRAM(s) Oral daily  pantoprazole    Tablet 40 milliGRAM(s) Oral before breakfast  senna 2 Tablet(s) Oral at bedtime  spironolactone 25 milliGRAM(s) Oral daily  ticagrelor 90 milliGRAM(s) Oral two times a day    MEDICATIONS  (PRN):  dextrose 40% Gel 15 Gram(s) Oral once PRN Blood Glucose LESS THAN 70 milliGRAM(s)/deciliter  glucagon  Injectable 1 milliGRAM(s) IntraMuscular once PRN Glucose LESS THAN 70 milligrams/deciliter    Vital Signs Last 24 Hrs  T(C): 36.7 (11 Jun 2019 21:23), Max: 36.7 (11 Jun 2019 05:30)  T(F): 98 (11 Jun 2019 21:23), Max: 98 (11 Jun 2019 05:30)  HR: 90 (11 Jun 2019 21:23) (75 - 90)  BP: 108/68 (11 Jun 2019 21:23) (100/60 - 112/64)  BP(mean): --  RR: 18 (11 Jun 2019 21:23) (18 - 18)  SpO2: 100% (11 Jun 2019 21:23) (96% - 100%)    PHYSICAL EXAM:  GENERAL: NAD, well-developed  HEAD:  Atraumatic, Normocephalic  EYES: EOMI, conjunctiva and sclera clear  NECK: Supple, No JVD  CHEST/LUNG: Mild b/l rales at bases  HEART: Regular rate and rhythm; No murmurs, rubs, or gallops  ABDOMEN: Soft, Nontender, Nondistended; Bowel sounds present  EXTREMITIES:  2+ Peripheral Pulses, No clubbing, cyanosis, or edema  NEUROLOGY: non-focal  SKIN: No rashes or lesions    LABS:  06-11    135  |  101  |  69<H>  ----------------------------<  301<H>  3.9   |  18<L>  |  2.28<H>    Ca    9.4      11 Jun 2019 06:45      Creatinine Trend: 2.28 <--, 2.18 <--, 2.25 <--, 2.44 <--, 2.34 <--, 2.40 <--, 2.45 <--                        8.4    9.8   )-----------( 427      ( 11 Jun 2019 06:45 )             24.8     Urine Studies:

## 2019-06-11 NOTE — PROGRESS NOTE ADULT - ASSESSMENT
72 y/o female with pmhx of HTN, DM, CAD s/p CABG, HFrEF (LVEF 25%) severe MR, severe pulm HTN presents from [place] [facility] with shortness of breath, increased wob and fatigue. Admitted to CCU for management of ADHF, now pending transfer to [facility] for mitral clip evaluation.

## 2019-06-11 NOTE — PROGRESS NOTE ADULT - ASSESSMENT
74 y/o female with PMHx of HTN, DM, CAD s/p CABG (LIMA to LAD, SVG to OM, SVG to PDA at [facility] 2014), HFrEF (LVEF 25%) severe MR, severe pulm HTN, hypothyroidism, admitted to [facility] for SOB, now transferred to [facility] for [facility] Clip Eval    MERVIN  Likely sec to CHF/renal vasocongestion   Renal function fluctuates slightly likely sec to CHF.   Monitor bmp  Diuretics per cardio  IF renal function worsens, would hold off Spirinolactone   Avoid nephrotoxic agents      CKD stage 3  baseline cr 1.5-1.8  likely sec to HTN/DM/chf  Monitor renal function     SOB  likely sec to HF   Monitor daily weight and i/o  Diuretics per cardio  Patient with severe MR await Mitral Clip eval - Follow up CTS. planned for 6/13

## 2019-06-11 NOTE — PROGRESS NOTE ADULT - SUBJECTIVE AND OBJECTIVE BOX
[facility] [place] [facility]   MD RAHEEL SHAH MD RUORU WONG, PA    TEL:  OFFICE: [phone]  DR SANTOYO CELL: [phone]  JUSTEN KENDALL CELL: [phone]  DR. NGUYEN CELL: [phone]    RENAL FOLLOW UP NOTE  --------------------------------------------------------------------------------  HPI:      Pt seen and examined at bedside.       PAST HISTORY  --------------------------------------------------------------------------------  No significant changes to PMH, PSH, FHx, SHx, unless otherwise noted    ALLERGIES & MEDICATIONS  --------------------------------------------------------------------------------  Allergies    azithromycin (Hives; Pruritus)    Intolerances      Standing Inpatient Medications  aspirin enteric coated 81 milliGRAM(s) Oral daily  atorvastatin 40 milliGRAM(s) Oral at bedtime  buMETAnide 4 milliGRAM(s) Oral every 12 hours  dextrose 5%. 1000 milliLiter(s) IV Continuous <Continuous>  dextrose 50% Injectable 12.5 Gram(s) IV Push once  dextrose 50% Injectable 25 Gram(s) IV Push once  dextrose 50% Injectable 25 Gram(s) IV Push once  docusate sodium 100 milliGRAM(s) Oral two times a day  heparin  Injectable 5000 Unit(s) SubCutaneous every 12 hours  hydrALAZINE 20 milliGRAM(s) Oral three times a day  insulin glargine Injectable (LANTUS) 50 Unit(s) SubCutaneous at bedtime  insulin lispro (HumaLOG) corrective regimen sliding scale   SubCutaneous three times a day before meals  insulin lispro (HumaLOG) corrective regimen sliding scale   SubCutaneous at bedtime  insulin lispro Injectable (HumaLOG) 16 Unit(s) SubCutaneous three times a day before meals  isosorbide   dinitrate Tablet (ISORDIL) 10 milliGRAM(s) Oral three times a day  levothyroxine 50 MICROGram(s) Oral daily  montelukast 10 milliGRAM(s) Oral daily  pantoprazole    Tablet 40 milliGRAM(s) Oral before breakfast  senna 2 Tablet(s) Oral at bedtime  spironolactone 25 milliGRAM(s) Oral daily  ticagrelor 90 milliGRAM(s) Oral two times a day    PRN Inpatient Medications  dextrose 40% Gel 15 Gram(s) Oral once PRN  glucagon  Injectable 1 milliGRAM(s) IntraMuscular once PRN      REVIEW OF SYSTEMS  --------------------------------------------------------------------------------  General: no fever  CVS: no chest pain  RESP: + sob, no cough  ABD: no abdominal pain  : no dysuria,  MSK: no edema     VITALS/PHYSICAL EXAM  --------------------------------------------------------------------------------  T(C): 36.6 (06-11-19 @ 12:05), Max: 36.7 (06-11-19 @ 05:30)  HR: 84 (06-11-19 @ 13:03) (75 - 86)  BP: 100/60 (06-11-19 @ 13:03) (94/55 - 112/64)  RR: 18 (06-11-19 @ 12:05) (18 - 18)  SpO2: 97% (06-11-19 @ 12:10) (97% - 99%)  Wt(kg): --        06-10-19 @ 07:01  -  06-11-19 @ 07:00  --------------------------------------------------------  IN: 580 mL / OUT: 1390 mL / NET: -810 mL    06-11-19 @ 07:01  -  06-11-19 @ 15:15  --------------------------------------------------------  IN: 360 mL / OUT: 750 mL / NET: -390 mL      Physical Exam:  	Gen: NAD  	HEENT: MMMARY  	Pulm: CTA B/L  	CV: S1S2  	Abd: Soft, +BS  	Ext: No LE edema B/L                      Neuro: Awake   	Skin: Warm and Dry   	    LABS/STUDIES  --------------------------------------------------------------------------------              8.4    9.8   >-----------<  427      [06-11-19 @ 06:45]              24.8     135  |  101  |  69  ----------------------------<  301      [06-11-19 @ 06:45]  3.9   |  18  |  2.28        Ca     9.4     [06-11-19 @ 06:45]            Creatinine Trend:  SCr 2.28 [06-11 @ 06:45]  SCr 2.18 [06-10 @ 06:41]  SCr 2.25 [06-09 @ 06:16]  SCr 2.44 [06-08 @ 06:08]  SCr 2.34 [06-07 @ 06:44]    Urinalysis - [06-01-19 @ 08:00]      Color COLORLESS / Appearance CLEAR / SG 1.009 / pH 6.0      Gluc 500 / Ketone NEGATIVE  / Bili NEGATIVE / Urobili NORMAL       Blood NEGATIVE / Protein NEGATIVE / Leuk Est NEGATIVE / Nitrite NEGATIVE      RBC  / WBC  / Hyaline  / Gran  / Sq Epi  / Non Sq Epi  / Bacteria       .4 (Ca --)      [04-25-19 @ 05:45]   --  PTH 43.55 (Ca --)      [09-10-18 @ 07:15]   --  PTH 36.60 (Ca --)      [09-08-18 @ 03:15]   --  Vitamin D (25OH) 25.9      [05-01-19 @ 04:00]  HbA1c 7.8      [06-01-19 @ 08:00]  TSH 0.75      [06-01-19 @ 08:00]  Lipid: chol 148, , HDL 35,       [06-01-19 @ 08:00]

## 2019-06-11 NOTE — PROGRESS NOTE ADULT - PROBLEM SELECTOR PLAN 1
CHF (congestive heart failure), acute on chronic  H/O HFrEF (LVEF 25%) severe MR, severe pulm HTN wtransferred for clip at [facility].  Recent RHC 5/9/19 showed RA 14, V 71/17, PA 69/20/41, PCWP 27, PA sat 39%, CO3.36, CI 2.37, SVR 1825. PVR 4.17.       hold BB at this time in the setting ADHF.

## 2019-06-12 LAB
ANION GAP SERPL CALC-SCNC: 17 MMOL/L — SIGNIFICANT CHANGE UP (ref 5–17)
APPEARANCE UR: CLEAR — SIGNIFICANT CHANGE UP
APTT BLD: 49.8 SEC — HIGH (ref 27.5–36.3)
BILIRUB UR-MCNC: NEGATIVE — SIGNIFICANT CHANGE UP
BLD GP AB SCN SERPL QL: NEGATIVE — SIGNIFICANT CHANGE UP
BUN SERPL-MCNC: 70 MG/DL — HIGH (ref 7–23)
CALCIUM SERPL-MCNC: 9.8 MG/DL — SIGNIFICANT CHANGE UP (ref 8.4–10.5)
CHLORIDE SERPL-SCNC: 101 MMOL/L — SIGNIFICANT CHANGE UP (ref 96–108)
CO2 SERPL-SCNC: 19 MMOL/L — LOW (ref 22–31)
COLOR SPEC: SIGNIFICANT CHANGE UP
CREAT SERPL-MCNC: 2.26 MG/DL — HIGH (ref 0.5–1.3)
DIFF PNL FLD: NEGATIVE — SIGNIFICANT CHANGE UP
FIBRINOGEN PPP-MCNC: 874 MG/DL — HIGH (ref 350–510)
GLUCOSE BLDC GLUCOMTR-MCNC: 162 MG/DL — HIGH (ref 70–99)
GLUCOSE BLDC GLUCOMTR-MCNC: 178 MG/DL — HIGH (ref 70–99)
GLUCOSE BLDC GLUCOMTR-MCNC: 274 MG/DL — HIGH (ref 70–99)
GLUCOSE BLDC GLUCOMTR-MCNC: 299 MG/DL — HIGH (ref 70–99)
GLUCOSE SERPL-MCNC: 125 MG/DL — HIGH (ref 70–99)
GLUCOSE UR QL: NEGATIVE — SIGNIFICANT CHANGE UP
HCT VFR BLD CALC: 27.3 % — LOW (ref 34.5–45)
HGB BLD-MCNC: 9.2 G/DL — LOW (ref 11.5–15.5)
INR BLD: 1.07 RATIO — SIGNIFICANT CHANGE UP (ref 0.88–1.16)
KETONES UR-MCNC: NEGATIVE — SIGNIFICANT CHANGE UP
LEUKOCYTE ESTERASE UR-ACNC: ABNORMAL
MCHC RBC-ENTMCNC: 28.6 PG — SIGNIFICANT CHANGE UP (ref 27–34)
MCHC RBC-ENTMCNC: 33.7 GM/DL — SIGNIFICANT CHANGE UP (ref 32–36)
MCV RBC AUTO: 84.8 FL — SIGNIFICANT CHANGE UP (ref 80–100)
MRSA PCR RESULT.: SIGNIFICANT CHANGE UP
NITRITE UR-MCNC: POSITIVE
PH UR: 6 — SIGNIFICANT CHANGE UP (ref 5–8)
PLATELET # BLD AUTO: 483 K/UL — HIGH (ref 150–400)
POTASSIUM SERPL-MCNC: 3.8 MMOL/L — SIGNIFICANT CHANGE UP (ref 3.5–5.3)
POTASSIUM SERPL-SCNC: 3.8 MMOL/L — SIGNIFICANT CHANGE UP (ref 3.5–5.3)
PROT UR-MCNC: SIGNIFICANT CHANGE UP
PROTHROM AB SERPL-ACNC: 12.3 SEC — SIGNIFICANT CHANGE UP (ref 10–12.9)
RBC # BLD: 3.22 M/UL — LOW (ref 3.8–5.2)
RBC # FLD: 15.5 % — HIGH (ref 10.3–14.5)
RH IG SCN BLD-IMP: POSITIVE — SIGNIFICANT CHANGE UP
S AUREUS DNA NOSE QL NAA+PROBE: SIGNIFICANT CHANGE UP
SODIUM SERPL-SCNC: 137 MMOL/L — SIGNIFICANT CHANGE UP (ref 135–145)
SP GR SPEC: 1.01 — SIGNIFICANT CHANGE UP (ref 1.01–1.02)
T3 SERPL-MCNC: 63 NG/DL — LOW (ref 80–200)
T4 AB SER-ACNC: 6.8 UG/DL — SIGNIFICANT CHANGE UP (ref 4.6–12)
UROBILINOGEN FLD QL: NEGATIVE — SIGNIFICANT CHANGE UP
WBC # BLD: 12.3 K/UL — HIGH (ref 3.8–10.5)
WBC # FLD AUTO: 12.3 K/UL — HIGH (ref 3.8–10.5)

## 2019-06-12 PROCEDURE — 99232 SBSQ HOSP IP/OBS MODERATE 35: CPT

## 2019-06-12 PROCEDURE — 71045 X-RAY EXAM CHEST 1 VIEW: CPT | Mod: 26

## 2019-06-12 RX ORDER — CEFUROXIME AXETIL 250 MG
1500 TABLET ORAL ONCE
Refills: 0 | Status: COMPLETED | OUTPATIENT
Start: 2019-06-12 | End: 2019-06-12

## 2019-06-12 RX ORDER — CIPROFLOXACIN LACTATE 400MG/40ML
250 VIAL (ML) INTRAVENOUS EVERY 24 HOURS
Refills: 0 | Status: DISCONTINUED | OUTPATIENT
Start: 2019-06-12 | End: 2019-06-13

## 2019-06-12 RX ORDER — CHLORHEXIDINE GLUCONATE 213 G/1000ML
1 SOLUTION TOPICAL ONCE
Refills: 0 | Status: COMPLETED | OUTPATIENT
Start: 2019-06-13 | End: 2019-06-13

## 2019-06-12 RX ORDER — CHLORHEXIDINE GLUCONATE 213 G/1000ML
1 SOLUTION TOPICAL ONCE
Refills: 0 | Status: COMPLETED | OUTPATIENT
Start: 2019-06-12 | End: 2019-06-12

## 2019-06-12 RX ORDER — CHLORHEXIDINE GLUCONATE 213 G/1000ML
30 SOLUTION TOPICAL ONCE
Refills: 0 | Status: COMPLETED | OUTPATIENT
Start: 2019-06-12 | End: 2019-06-13

## 2019-06-12 RX ORDER — ACETAMINOPHEN 500 MG
650 TABLET ORAL EVERY 6 HOURS
Refills: 0 | Status: DISCONTINUED | OUTPATIENT
Start: 2019-06-12 | End: 2019-06-19

## 2019-06-12 RX ORDER — ALPRAZOLAM 0.25 MG
0.25 TABLET ORAL ONCE
Refills: 0 | Status: DISCONTINUED | OUTPATIENT
Start: 2019-06-12 | End: 2019-06-12

## 2019-06-12 RX ADMIN — MONTELUKAST 10 MILLIGRAM(S): 4 TABLET, CHEWABLE ORAL at 22:18

## 2019-06-12 RX ADMIN — ATORVASTATIN CALCIUM 40 MILLIGRAM(S): 80 TABLET, FILM COATED ORAL at 22:27

## 2019-06-12 RX ADMIN — PANTOPRAZOLE SODIUM 40 MILLIGRAM(S): 20 TABLET, DELAYED RELEASE ORAL at 06:29

## 2019-06-12 RX ADMIN — Medication 20 MILLIGRAM(S): at 13:23

## 2019-06-12 RX ADMIN — Medication 3: at 18:11

## 2019-06-12 RX ADMIN — BUMETANIDE 4 MILLIGRAM(S): 0.25 INJECTION INTRAMUSCULAR; INTRAVENOUS at 09:18

## 2019-06-12 RX ADMIN — Medication 250 MILLIGRAM(S): at 18:11

## 2019-06-12 RX ADMIN — Medication 100 MILLIGRAM(S): at 06:29

## 2019-06-12 RX ADMIN — Medication 1: at 22:18

## 2019-06-12 RX ADMIN — INSULIN GLARGINE 50 UNIT(S): 100 INJECTION, SOLUTION SUBCUTANEOUS at 22:17

## 2019-06-12 RX ADMIN — ISOSORBIDE DINITRATE 10 MILLIGRAM(S): 5 TABLET ORAL at 13:23

## 2019-06-12 RX ADMIN — HEPARIN SODIUM 5000 UNIT(S): 5000 INJECTION INTRAVENOUS; SUBCUTANEOUS at 06:13

## 2019-06-12 RX ADMIN — SPIRONOLACTONE 25 MILLIGRAM(S): 25 TABLET, FILM COATED ORAL at 09:19

## 2019-06-12 RX ADMIN — ISOSORBIDE DINITRATE 10 MILLIGRAM(S): 5 TABLET ORAL at 22:19

## 2019-06-12 RX ADMIN — Medication 81 MILLIGRAM(S): at 09:19

## 2019-06-12 RX ADMIN — TICAGRELOR 90 MILLIGRAM(S): 90 TABLET ORAL at 06:29

## 2019-06-12 RX ADMIN — TICAGRELOR 90 MILLIGRAM(S): 90 TABLET ORAL at 18:10

## 2019-06-12 RX ADMIN — Medication 1: at 09:19

## 2019-06-12 RX ADMIN — Medication 0.25 MILLIGRAM(S): at 20:20

## 2019-06-12 RX ADMIN — Medication 650 MILLIGRAM(S): at 13:00

## 2019-06-12 RX ADMIN — ISOSORBIDE DINITRATE 10 MILLIGRAM(S): 5 TABLET ORAL at 09:19

## 2019-06-12 RX ADMIN — Medication 20 MILLIGRAM(S): at 22:19

## 2019-06-12 RX ADMIN — Medication 650 MILLIGRAM(S): at 12:34

## 2019-06-12 RX ADMIN — Medication 100 MILLIGRAM(S): at 18:10

## 2019-06-12 RX ADMIN — Medication 1: at 13:23

## 2019-06-12 RX ADMIN — Medication 650 MILLIGRAM(S): at 03:51

## 2019-06-12 RX ADMIN — Medication 50 MICROGRAM(S): at 06:12

## 2019-06-12 RX ADMIN — Medication 16 UNIT(S): at 13:23

## 2019-06-12 RX ADMIN — Medication 20 MILLIGRAM(S): at 09:19

## 2019-06-12 RX ADMIN — SENNA PLUS 2 TABLET(S): 8.6 TABLET ORAL at 22:18

## 2019-06-12 RX ADMIN — HEPARIN SODIUM 5000 UNIT(S): 5000 INJECTION INTRAVENOUS; SUBCUTANEOUS at 18:11

## 2019-06-12 RX ADMIN — CHLORHEXIDINE GLUCONATE 1 APPLICATION(S): 213 SOLUTION TOPICAL at 21:30

## 2019-06-12 RX ADMIN — Medication 16 UNIT(S): at 09:19

## 2019-06-12 RX ADMIN — Medication 650 MILLIGRAM(S): at 04:30

## 2019-06-12 RX ADMIN — BUMETANIDE 4 MILLIGRAM(S): 0.25 INJECTION INTRAMUSCULAR; INTRAVENOUS at 18:10

## 2019-06-12 RX ADMIN — Medication 16 UNIT(S): at 18:11

## 2019-06-12 NOTE — PROGRESS NOTE ADULT - SUBJECTIVE AND OBJECTIVE BOX
HPI/Interval Hx    Resting in bed, without complaints. No acute events, denies shortness of breath. Awaiting mitraclip.    MEDICATIONS  (STANDING):  aspirin enteric coated 81 milliGRAM(s) Oral daily  atorvastatin 40 milliGRAM(s) Oral at bedtime  buMETAnide 4 milliGRAM(s) Oral every 12 hours  dextrose 5%. 1000 milliLiter(s) (50 mL/Hr) IV Continuous <Continuous>  dextrose 50% Injectable 12.5 Gram(s) IV Push once  dextrose 50% Injectable 25 Gram(s) IV Push once  dextrose 50% Injectable 25 Gram(s) IV Push once  docusate sodium 100 milliGRAM(s) Oral two times a day  heparin  Injectable 5000 Unit(s) SubCutaneous every 12 hours  hydrALAZINE 20 milliGRAM(s) Oral three times a day  insulin glargine Injectable (LANTUS) 50 Unit(s) SubCutaneous at bedtime  insulin lispro (HumaLOG) corrective regimen sliding scale   SubCutaneous three times a day before meals  insulin lispro (HumaLOG) corrective regimen sliding scale   SubCutaneous at bedtime  insulin lispro Injectable (HumaLOG) 16 Unit(s) SubCutaneous three times a day before meals  isosorbide   dinitrate Tablet (ISORDIL) 10 milliGRAM(s) Oral three times a day  levothyroxine 50 MICROGram(s) Oral daily  montelukast 10 milliGRAM(s) Oral daily  pantoprazole    Tablet 40 milliGRAM(s) Oral before breakfast  senna 2 Tablet(s) Oral at bedtime  spironolactone 25 milliGRAM(s) Oral daily  ticagrelor 90 milliGRAM(s) Oral two times a day    MEDICATIONS  (PRN):  acetaminophen   Tablet .. 650 milliGRAM(s) Oral every 6 hours PRN Temp greater or equal to 38C (100.4F), Moderate Pain (4 - 6)  dextrose 40% Gel 15 Gram(s) Oral once PRN Blood Glucose LESS THAN 70 milliGRAM(s)/deciliter  glucagon  Injectable 1 milliGRAM(s) IntraMuscular once PRN Glucose LESS THAN 70 milligrams/deciliter      PAST MEDICAL & SURGICAL HISTORY:  GERD (gastroesophageal reflux disease)  CAD (coronary artery disease): s/p CABG  Hypothyroidism  Hyperlipidemia  Diabetes mellitus, type 2  S/P CABG (coronary artery bypass graft)      Review of Systems  CONSTITUTIONAL: No weakness, fevers or chills, (+) fatigue  EYES/ENT: No visual changes;  No vertigo or throat pain   NECK: No pain or stiffness  RESPIRATORY: No cough, wheezing, hemoptysis; No shortness of breath  CARDIOVASCULAR: No chest pain or palpitations, PND, orthopnea, or LE edema  GASTROINTESTINAL: No abdominal or epigastric pain. No nausea, vomiting, or hematemesis; No diarrhea or constipation. No melena or hematochezia.  GENITOURINARY: No dysuria, frequency or hematuria  NEUROLOGICAL: No numbness or weakness  SKIN: No itching, burning, rashes, or lesions   All other review of systems is negative unless indicated above.    Physical Exam  General: A/ox 3, No acute Distress  Neck: Supple, NO JVD  Cardiac: S1 S2, II/VI LUSB murmur  Pulmonary: CTAB, Breathing unlabored, No Rhonchi/Rales/Wheezing  Abdomen: Soft, Non -tender, +BS x 4 quads  Extremities: No Rashes, No edema  Neuro: A/o x 3, No focal deficits  Vital Signs Last 24 Hrs  T(C): 36.3 (2019 04:56), Max: 36.7 (2019 21:23)  T(F): 97.4 (2019 04:56), Max: 98 (2019 21:23)  HR: 81 (2019 09:35) (75 - 90)  BP: 110/64 (2019 09:13) (97/54 - 110/64)  BP(mean): --  RR: 20 (2019 09:35) (18 - 20)  SpO2: 100% (2019 09:35) (95% - 100%)                        9.2    12.3  )-----------( 483      ( 2019 06:41 )             27.3     06-12    137  |  101  |  70<H>  ----------------------------<  125<H>  3.8   |  19<L>  |  2.26<H>    Ca    9.8      2019 06:41        Telemetry: NSR 70's    Other  < from: TTE with Doppler (w/Cont) (19 @ 09:41) >    Patient name: SELVIN CLAIRE  YOB: 1945   Age: 73 (F)   MR#: [id number]  Study Date: 2019  Location: [facility]onographer: JOSE Adams  Study quality: Technically Difficult  Referring Physician: Phoebe Baxter MD  Blood Pressure: 98/51 mmHg  Height: 150 cm  Weight: 62 kg  BSA: 1.6 m2  ------------------------------------------------------------------------  PROCEDURE: Transthoracic echocardiogram with 2-D, M-Mode  and complete spectral and color flow Doppler.  Verbal consent was obtained for injection of echo contrast.  Following  intravenous injection of contrast, harmonic  imaging was performed.Lot#[id number]  INDICATION: Dyspnea, unspecified (R06.00)  ------------------------------------------------------------------------  DIMENSIONS:  Dimensions:     Normal Values:  LA:     3.8 cm    2.0 - 4.0 cm  Ao:     3.2 cm    2.0 - 3.8 cm  SEPTUM: 0.8 cm    0.6 - 1.2 cm  PWT:    0.8 cm    0.6 - 1.1 cm  LVIDd:  5.2 cm    3.0 - 5.6 cm  LVIDs:  4.3 cm    1.8 - 4.0 cm  Derived Variables:  LVMI: 92 g/m2  RWT: 0.30  Fractional short: 17 %  Ejection Fraction (Visual Estimate): 30 %  ------------------------------------------------------------------------  OBSERVATIONS:  Mitral Valve: Tethered mitral valve leaflets with normal  opening. Moderate-severe mitral regurgitation.  Aortic Root: Normal aortic root.  Aortic Valve: Aortic valve not well visualized; appears  calcified. Mild aortic regurgitation.  Left Atrium: Normal left atrium.  LA volume index = 24  cc/m2.  Left Ventricle:Severe global left ventricular systolic  dysfunction. Endocardial visualization enhanced with  intravenous injection of echo contrast (Definity).  Eccentric left ventricular hypertrophy (dilated left  ventricle with normal relative wall thickness).  Right Heart: Normal right atrium. The right ventricle is  not well visualized. decreased right ventricular systolic  function. Normal tricuspid valve. Mild-moderate tricuspid  regurgitation. Normal pulmonic valve.  Mild pulmonic  regurgitation.  Pericardium/PleuraNormal pericardium with no pericardial  effusion.  Hemodynamic: Estimated right ventricular systolic pressure  equals 78 mm Hg, assuming right atrial pressure equals 10  mm Hg, consistent with severe pulmonary hypertension.  ------------------------------------------------------------------------  CONCLUSIONS:  1. Tethered mitral valve leaflets with normal opening.  Moderate-severe mitral regurgitation.  2. Severe global left ventricular systolic dysfunction.  Endocardial visualization enhanced with intravenous  injection of echo contrast (Definity).  3. The right ventricle is not well visualized. decreased  right ventricular systolic function.  4. Estimated right ventricular systolic pressure equals 78  mm Hg, assuming right atrial pressure equals 10 mm Hg,  consistent with severe pulmonary hypertension.  *** Compared with echocardiogram of 2019, no  significant changes noted.  ------------------------------------------------------------------------  Confirmed on  2019 - 15:18:21 by Meaghan Arvizu MD  ------------------------------------------------------------------------    < end of copied text >    < from: Cardiac Cath Lab - Adult (19 @ 17:37) >  [address] [place] [address]  [phone]  Cath Lab Report -- Comprehensive Report  Patient: SELVIN CLAIRE  Study date: 2019  Account number: [id number]  MR number: [id number]  : 1945  Gender: Female  Race: A  Case Physician(s):  Tor Prado M.D.  Fellow:  Huey Reyes M.D.  Referring Physician:  Bonilla Hay M.D.  INDICATIONS: Acute on chronic systolic congestive heart failure. Mitral  valve insufficiency.  HISTORY: Anemia. Mitral valve: history of severe regurgitation. History  includes ischemic cardiomyopathy and pulmonary hypertension. The patient  has hypertension, insulin-controlled diabetes, and renal failure (not  requiring dialysis). PRIOR CARDIOVASCULAR PROCEDURES: Coronary bypass.  PROCEDURE:  --  Sonosite - Diagnostic.  --  Right heart catheterization.  --  Left coronary angiography.  --  Right coronary angiography.  --  Left heart catheterization.  TECHNIQUE: Oxygen 2 L/min. The risks and alternativesof the procedures and  conscious sedation were explained to the patient and informed consent was  obtained. Cardiac catheterization performed electively.  Sonosite - Diagnostic. Right common femoral vein and artery were identified  and access was obtained using ultrasound guidance. Right femoral vein  access. Local anesthetic given. The puncture site was infiltrated with 2 %  lidocaine. A 7fr Sheath [place] was inserted in the vessel. Right femoral  artery access. Local anesthetic given. The puncture site was infiltrated  with 2 % lidocaine. A 4fr Sheath Kingston was inserted in the vessel.  Right heart catheterization. The procedure was performed utilizing a 7fr  [place] Angi catheter. Left coronary artery angiography. The vessel was  injectedutilizing a 4 Fr JL 4.0 catheter. Right coronary artery  angiography. The vessel was injected utilizing a 4 Fr JR 4.0 catheter.  Left heart catheterization. A 4 Fr Mod Pig Angled 5 Sideholes catheter was  utilized. After recording ascending aortic pressure, the catheter was  advanced across the aortic valve and left ventricular pressure was  recorded. RADIATION EXPOSURE: 7.8 min.  CONTRAST GIVEN: Visipaque 10 ml.  MEDICATIONS GIVEN: 2% Lidocaine, 10 ml, subcutaneously. 0.9% Normal saline,  10 ml,IV.  HEMODYNAMICS: There is severe pulmonary hypertension.  VENTRICLES: No left ventriculogram was performed.  CORONARY VESSELS: The coronary circulation is right dominant.  LM:   --  Distal left main: There was a 0 % stenosis at the site of a prior  stent.  LAD:   --  Proximal LAD: There was a tubular 70 % stenosis. There was PARUL  grade 3 flow through the vessel (brisk flow).  --  Mid LAD: There was a 100 % stenosis. There was PARUL grade 0 flow  through the vessel (no flow).  CX:   --  Circumflex: Angiography showed minor luminal irregularities with  no flow limiting lesions.  RI:   --  Ostial ramus intermedius: The distal vessel was supplied by  collaterals from the LAD. There was a 100 % stenosis at the site of a  prior stent. There was PARUL grade 0 flow through the vessel (no flow).  RCA:   --  Mid RCA: The distal vessel was supplied by collaterals from  septal branches of LAD. There was a 100 % stenosis just after RV marginal  2. There was PARUL grade 0 flow through the vessel (no flow).  GRAFTS:   --  Graft to the mid LAD: The graft was a LIMA. It was normal.  COMPLICATIONS: There were no complications.  DIAGNOSTIC RECOMMENDATIONS: Patient management should include close  monitoring of BUN and creatinine. Medical management is recommended.  Prepared and signed by  Tor Prado M.D.  Signed 2019 18:53:31  HEMODYNAMIC TABLES  Pressures:  NO PHASE  Pressures:  - HR: 80  Pressures:  - Rhythm:  Pressures:  -- Aortic Pressure (S/D/M): 105/44/68  Pressures:  -- Left Ventricle (s/edp): 109/25/--  Pressures:  -- Pulmonary Artery (S/D/M): 69/20/41  Pressures:  -- Pulmonary Capillary Wedge: 25/43/27  Pressures:  -- Right Atrium (a/v/M): 18/18/14  Pressures:  -- Right Ventricle (s/edp): 71/17/--  O2 Sats:  NO PHASE  O2 Sats: - HR: 80  O2 Sats:  - Rhythm:  O2 Sats:  -- AO: 7.2/92.7/9.08  O2 Sats:  -- PA: 7.1/39.3/3.79  Outputs:  NO PHASE  Outputs:  -- CALCULATIONS[age]  Outputs:  -- CALCULATIONS: Body Surface Area: 1.42  Outputs:  -- CALCULATIONS: Height in cm: 137.00  Outputs:  -- CALCULATIONS: Sex: Female  Outputs:  -- CALCULATIONS: Weight in k.00  Outputs:  -- OUTPUTS: CO by Chapin: 3.36  Outputs:  -- OUTPUTS: Chapin cardiac index: 2.37  Outputs:  -- OUTPUTS: O2 consumption: 177.27  Outputs:  -- OUTPUTS: Vo2 Indexed: 125.00  Outputs:  -- RESISTANCES: Left ventricular stroke work: 23.39  Outputs:  -- RESISTANCES: Left Ventricular Stroke Work index: 16.49  Outputs:  -- RESISTANCES: Pulmonary vascular index (dsc): 473.18  Outputs:  -- RESISTANCES: Pulmonary vascular index (Wood Units): 5.92  Outputs:  -- RESISTANCES: Pulmonary vascular resistance (dsc): 333.65  Outputs:  -- RESISTANCES: Pulmonary vascular resistance (Wood Units): 4.17  Outputs:  -- RESISTANCES: PVR_SVR Ratio: 0.26  Outputs:  -- RESISTANCES: Right ventricular stroke work: 15.40  Outputs:  -- RESISTANCES: Right ventricular stroke work index: 10.86  Outputs:  -- RESISTANCES: Systemic vascular index (dsc): 1825.13  Outputs:  -- RESISTANCES: Systemic vascular index (Wood Units): 22.82  Outputs:  -- RESISTANCES: Systemic vascular resistance (dsc): 1286.94  Outputs:  -- RESISTANCES: Systemic vascular resistance (Wood Units): 16.09  Outputs:  -- RESISTANCES: Total pulmonary index (dsc): 1385.75  Outputs:  -- RESISTANCES: Total pulmonary index (Wood Units): 17.33  Outputs:  -- RESISTANCES: Total pulmonary resistance (dsc): 977.12  Outputs:  -- RESISTANCES: Total pulmonary resistance (Wood Units): 12.22  Outputs:  -- RESISTANCES: Total vascular index (Wood Units): 28.74  Outputs:  -- RESISTANCES: Total vascular resistance (dsc): 1620.59  Outputs:  -- RESISTANCES: Total vascular resistance (Wood Units): 20.26  Outputs:  -- RESISTANCES: Total vascular resistance index (dsc): 2298.32  Outputs:  -- RESISTANCES: TPR_TVR Ratio: 0.60  Outputs:  -- SHUNTS: Pulmonary flow: 3.36  Outputs:  -- SHUNTS: Qp Indexed: 2.37  Outputs:  -- SHUNTS: Qs Indexed: 2.37  Outputs:  -- SHUNTS: Systemic flow: 3.36    < end of copied text >

## 2019-06-12 NOTE — PROGRESS NOTE ADULT - PROBLEM SELECTOR PLAN 1
Scheduled for mitraclip 6/13 with Dr. Leger and Dr. Newman  Pt. breathing comfortably, denies SOB, lungs clear, no edema on current diuretic regimen  Daily weights and strict I&O please  NPO after midnight

## 2019-06-12 NOTE — PROGRESS NOTE ADULT - PROBLEM SELECTOR PROBLEM 6
VTE (venous thromboembolism)

## 2019-06-12 NOTE — PROGRESS NOTE ADULT - SUBJECTIVE AND OBJECTIVE BOX
Cardiac Surgery Pre-op Note:  CC: Patient is a 73y old  Female who presents with a chief complaint of ADHF, eval for mitral clip (2019 14:15)                                                                                         Surgeon: Dr. Newman  Procedure: 19 Mitral Clip    Allergies    azithromycin (Hives; Pruritus)    Intolerances      HPI:  73 F PMH HTN, T2DM, CAD s/p CABG (LIMA to LAD, SVG to OM, SVG to PDA at [facility] ), HFrEF (LVEF 25%) severe MR, severe pulm HTN, hypothyroidism, [facility] on 19 with sob, increased wob. Pt had a prior admission in 2019 for ADHF and cp s/p LHC showing TVD medically managed. On this current admission, pt was f/w CXR showing pulm edema, probnp >4k, and dyspneic, thus admitted to CCU and started on milrinone gtt and bumex gtt for ADHF. Continued on bipap. Pt had repeat TTE which showed severe MR.  Pt developed acute on chronic kidney injury presumed 2/2 IV/gtt diuresis; milrinone was d/c 19, and bumex gtt d/c 19, with renal following for cardiorenal syn/hemodynamically mediated duncan.  Pt currently on po bumex bid, aldactone qd, for maintenance diuresis and PRN bumex IV pushes. Pt was transferred to [facility] for eval for mitral clip procedure.  Currently, pt endorses mild dyspnea, +frequent urination, +weakness. Denies cp, f/c, n/v/d, dysuria, cough.   Vs: 98.2, 85, 107/68, 18, 99%. (2019 21:44)      PAST MEDICAL & SURGICAL HISTORY:  GERD (gastroesophageal reflux disease)  CAD (coronary artery disease): s/p CABG  Hypothyroidism  Hyperlipidemia  Diabetes mellitus, type 2  S/P CABG (coronary artery bypass graft)      MEDICATIONS  (STANDING):  aspirin enteric coated 81 milliGRAM(s) Oral daily  atorvastatin 40 milliGRAM(s) Oral at bedtime  buMETAnide 4 milliGRAM(s) Oral every 12 hours  cefuroxime  IVPB 1500 milliGRAM(s) IV Intermittent once  chlorhexidine 0.12% Liquid 30 milliLiter(s) Swish and Spit once  chlorhexidine 4% Liquid 1 Application(s) Topical once  ciprofloxacin     Tablet 250 milliGRAM(s) Oral every 24 hours  docusate sodium 100 milliGRAM(s) Oral two times a day  heparin  Injectable 5000 Unit(s) SubCutaneous every 12 hours  hydrALAZINE 20 milliGRAM(s) Oral three times a day  insulin glargine Injectable (LANTUS) 50 Unit(s) SubCutaneous at bedtime  insulin lispro (HumaLOG) corrective regimen sliding scale   SubCutaneous three times a day before meals  insulin lispro (HumaLOG) corrective regimen sliding scale   SubCutaneous at bedtime  insulin lispro Injectable (HumaLOG) 16 Unit(s) SubCutaneous three times a day before meals  isosorbide   dinitrate Tablet (ISORDIL) 10 milliGRAM(s) Oral three times a day  levothyroxine 50 MICROGram(s) Oral daily  montelukast 10 milliGRAM(s) Oral daily  pantoprazole    Tablet 40 milliGRAM(s) Oral before breakfast  senna 2 Tablet(s) Oral at bedtime  spironolactone 25 milliGRAM(s) Oral daily  ticagrelor 90 milliGRAM(s) Oral two times a day    Labs:                        9.2    12.3  )-----------( 483      ( 2019 06:41 )             27.3     137  |  101  |  70<H>  ----------------------------<  125<H>  3.8   |  19<L>  |  2.26<H>      PT/INR/PTT - ( 2019 12:34 )   PT: 12.3 sec;   INR: 1.07 ratio  PTT:49.8 sec    Blood Type: ABO Interpretation: AB ( @ 12:31)  HGB A1C:   Pro-BNP:   Thyroid Panel:  @ 15:17 /6.8/63    MRSA:  / MSSA: sent, pending results  Urinalysis Basic - ( 2019 12:34 )    Color: Light Yellow / Appearance: Clear / S.014 / pH: x  Gluc: x / Ketone: Negative  / Bili: Negative / Urobili: Negative   Blood: x / Protein: Trace / Nitrite: Positive   Leuk Esterase: Large / RBC: 1 /hpf / WBC 40 /HPF   Sq Epi: x / Non Sq Epi: 1 /hpf / Bacteria: Many        CXR:     Carotid Duplex:      PFT's:    Echocardiogram:    Cardiac catheterization:    Gen: WN/WD NAD  Neuro: AAOx3, nonfocal  Pulm: CTA B/L  CV: RRR, S1S2 +systolic murmur  Abd: Soft, NT, ND +BS  Ext: No edema, + peripheral pulses      Pt has AICD/PPM [ ] Yes  [x ] No               Pre-op Beta Blocker ordered within 24 hrs of surgery (CABG ONLY)?  [ ] Yes  [X ] No  If not, Why?  Type & Cross  [X] Yes  [ ] No  NPO after Midnight [x ] Yes  [ ] No  Pre-op ABX ordered, to be taped on chart:  [ x] Yes  [ ] No     Hibiclens/Peridex ordered [x ] Yes  [ ] No  Intraop on Hold: PRBCs, CXR, LV [x ]   Consent obtained  [x ] Yes  [ ] No Cardiac Surgery Pre-op Note:  CC: Patient is a 73y old  Female who presents with a chief complaint of ADHF, eval for mitral clip (2019 14:15)                                                                                         Surgeon: Dr. Newman  Procedure: 19 Mitral Clip    Allergies    azithromycin (Hives; Pruritus)    Intolerances      HPI:  73 F PMH HTN, T2DM, CAD s/p CABG (LIMA to LAD, SVG to OM, SVG to PDA at [facility] ), HFrEF (LVEF 25%) severe MR, severe pulm HTN, hypothyroidism, [facility] on 19 with sob, increased wob. Pt had a prior admission in 2019 for ADHF and cp s/p LHC showing TVD medically managed. On this current admission, pt was f/w CXR showing pulm edema, probnp >4k, and dyspneic, thus admitted to CCU and started on milrinone gtt and bumex gtt for ADHF. Continued on bipap. Pt had repeat TTE which showed severe MR.  Pt developed acute on chronic kidney injury presumed 2/2 IV/gtt diuresis; milrinone was d/c 19, and bumex gtt d/c 19, with renal following for cardiorenal syn/hemodynamically mediated duncan.  Pt currently on po bumex bid, aldactone qd, for maintenance diuresis and PRN bumex IV pushes. Pt was transferred to [facility] for eval for mitral clip procedure.  Currently, pt endorses mild dyspnea, +frequent urination, +weakness. Denies cp, f/c, n/v/d, dysuria, cough.   Vs: 98.2, 85, 107/68, 18, 99%. (2019 21:44)      PAST MEDICAL & SURGICAL HISTORY:  GERD (gastroesophageal reflux disease)  CAD (coronary artery disease): s/p CABG  Hypothyroidism  Hyperlipidemia  Diabetes mellitus, type 2  S/P CABG (coronary artery bypass graft)      MEDICATIONS  (STANDING):  aspirin enteric coated 81 milliGRAM(s) Oral daily  atorvastatin 40 milliGRAM(s) Oral at bedtime  buMETAnide 4 milliGRAM(s) Oral every 12 hours  cefuroxime  IVPB 1500 milliGRAM(s) IV Intermittent once  chlorhexidine 0.12% Liquid 30 milliLiter(s) Swish and Spit once  chlorhexidine 4% Liquid 1 Application(s) Topical once  ciprofloxacin     Tablet 250 milliGRAM(s) Oral every 24 hours  docusate sodium 100 milliGRAM(s) Oral two times a day  heparin  Injectable 5000 Unit(s) SubCutaneous every 12 hours  hydrALAZINE 20 milliGRAM(s) Oral three times a day  insulin glargine Injectable (LANTUS) 50 Unit(s) SubCutaneous at bedtime  insulin lispro (HumaLOG) corrective regimen sliding scale   SubCutaneous three times a day before meals  insulin lispro (HumaLOG) corrective regimen sliding scale   SubCutaneous at bedtime  insulin lispro Injectable (HumaLOG) 16 Unit(s) SubCutaneous three times a day before meals  isosorbide   dinitrate Tablet (ISORDIL) 10 milliGRAM(s) Oral three times a day  levothyroxine 50 MICROGram(s) Oral daily  montelukast 10 milliGRAM(s) Oral daily  pantoprazole    Tablet 40 milliGRAM(s) Oral before breakfast  senna 2 Tablet(s) Oral at bedtime  spironolactone 25 milliGRAM(s) Oral daily  ticagrelor 90 milliGRAM(s) Oral two times a day    Labs:                        9.2    12.3  )-----------( 483      ( 2019 06:41 )             27.3     137  |  101  |  70<H>  ----------------------------<  125<H>  3.8   |  19<L>  |  2.26<H>      PT/INR/PTT - ( 2019 12:34 )   PT: 12.3 sec;   INR: 1.07 ratio  PTT:49.8 sec    Blood Type: ABO Interpretation: AB ( @ 12:31)  HGB A1C: 7.8  Pro-BNP: 4102  Thyroid Panel:  @ 15:17 /6.8/63    MRSA:  / MSSA: sent, pending results  Urinalysis Basic - ( 2019 12:34 )  Color: Light Yellow / Appearance: Clear / S.014 / pH: x  Gluc: x / Ketone: Negative  / Bili: Negative / Urobili: Negative   Blood: x / Protein: Trace / Nitrite: Positive   Leuk Esterase: Large / RBC: 1 /hpf / WBC 40 /HPF   Sq Epi: x / Non Sq Epi: 1 /hpf / Bacteria: Many    CXR : Lungs clear    < from: VA Duplex Carotid, Bilat (19 @ 16:01) >  Impression:There is no significant hemodynamic stenosis of either   carotid artery.      PFT's:  completed     < from: TTE with Doppler (w/Cont) (19 @ 09:41) >  OBSERVATIONS:  Mitral Valve: Tethered mitral valve leaflets with normal  opening. Moderate-severe mitral regurgitation.  Aortic Root: Normal aortic root.  Aortic Valve: Aortic valve not well visualized; appears  calcified. Mild aortic regurgitation.  Left Atrium: Normal left atrium.  LA volume index = 24  cc/m2.  Left Ventricle:Severe global left ventricular systolic  dysfunction. Endocardial visualization enhanced with  intravenous injection of echo contrast (Definity).  Eccentric left ventricular hypertrophy (dilated left  ventricle with normal relative wall thickness).  Right Heart: Normal right atrium. The right ventricle is  not well visualized. decreased right ventricular systolic  function. Normal tricuspid valve. Mild-moderate tricuspid  regurgitation. Normal pulmonic valve.  Mild pulmonic  regurgitation.  Pericardium/PleuraNormal pericardium with no pericardial  effusion.  Hemodynamic: Estimated right ventricular systolic pressure  equals 78 mm Hg, assuming right atrial pressure equals 10  mm Hg, consistent with severe pulmonary hypertension.      < from: Cardiac Cath Lab - Adult (19 @ 17:37) >  CORONARY VESSELS: The coronary circulation is right dominant.  LM:   --  Distal left main: There was a 0 % stenosis at the site of a prior  stent.  LAD:   --  Proximal LAD: There was a tubular 70 % stenosis. There was PARUL  grade 3 flow through the vessel (brisk flow).  --  Mid LAD: There was a 100 % stenosis. There was PARUL grade 0 flow  through the vessel (no flow).  CX:   --  Circumflex: Angiography showed minor luminal irregularities with  no flow limiting lesions.  RI:   --  Ostial ramus intermedius: The distal vessel was supplied by  collaterals from the LAD. There was a 100 % stenosis at the site of a  prior stent. There was PARUL grade 0 flow through the vessel (no flow).  RCA:   --  Mid RCA: The distal vessel was supplied by collaterals from  septal branches of LAD. There was a 100 % stenosis just after RV marginal  2. There was PARUL grade 0 flow through the vessel (no flow).  GRAFTS:   --  Graft to the mid LAD: The graft was a LIMA. It was normal.  COMPLICATIONS: There were no complications.        PHYSICAL EXAM:  Gen: WN/WD NAD  Neuro: AAOx3, nonfocal  Pulm: CTA B/L  CV: RRR, S1S2 +systolic murmur  Abd: Soft, NT, ND +BS  Ext: No edema, + peripheral pulses      Pt has AICD/PPM [ ] Yes  [x ] No               Pre-op Beta Blocker ordered within 24 hrs of surgery (CABG ONLY)?  [ ] Yes  [X ] No  If not, Why?  Type & Cross  [X] Yes  [ ] No  NPO after Midnight [x ] Yes  [ ] No  Pre-op ABX ordered, to be taped on chart:  [ x] Yes  [ ] No     Hibiclens/Peridex ordered [x ] Yes  [ ] No  Intraop on Hold: PRBCs, CXR, LV [x ]   Consent obtained  [x ] Yes  [ ] No

## 2019-06-12 NOTE — PROGRESS NOTE ADULT - PROBLEM SELECTOR PLAN 7
Patient with severe MR, pending mitral clip

## 2019-06-12 NOTE — PROGRESS NOTE ADULT - PROBLEM SELECTOR PROBLEM 7
Mitral regurgitation

## 2019-06-12 NOTE — PROGRESS NOTE ADULT - SUBJECTIVE AND OBJECTIVE BOX
[facility]   MD RAHEEL SHAH MD RUORU WONG, PA    TEL:  OFFICE: [phone]  DR SANTOYO CELL: [phone]  JUSTEN KENDALL CELL: [phone]  DR. NGUYEN CELL: [phone]    RENAL FOLLOW UP NOTE  --------------------------------------------------------------------------------  HPI:      Pt seen and examined at bedside.     PAST HISTORY  --------------------------------------------------------------------------------  No significant changes to PMH, PSH, FHx, SHx, unless otherwise noted    ALLERGIES & MEDICATIONS  --------------------------------------------------------------------------------  Allergies    azithromycin (Hives; Pruritus)    Intolerances      Standing Inpatient Medications  aspirin enteric coated 81 milliGRAM(s) Oral daily  atorvastatin 40 milliGRAM(s) Oral at bedtime  buMETAnide 4 milliGRAM(s) Oral every 12 hours  cefuroxime  IVPB 1500 milliGRAM(s) IV Intermittent once  chlorhexidine 0.12% Liquid 30 milliLiter(s) Swish and Spit once  chlorhexidine 4% Liquid 1 Application(s) Topical once  dextrose 5%. 1000 milliLiter(s) IV Continuous <Continuous>  dextrose 50% Injectable 12.5 Gram(s) IV Push once  dextrose 50% Injectable 25 Gram(s) IV Push once  dextrose 50% Injectable 25 Gram(s) IV Push once  docusate sodium 100 milliGRAM(s) Oral two times a day  heparin  Injectable 5000 Unit(s) SubCutaneous every 12 hours  hydrALAZINE 20 milliGRAM(s) Oral three times a day  insulin glargine Injectable (LANTUS) 50 Unit(s) SubCutaneous at bedtime  insulin lispro (HumaLOG) corrective regimen sliding scale   SubCutaneous three times a day before meals  insulin lispro (HumaLOG) corrective regimen sliding scale   SubCutaneous at bedtime  insulin lispro Injectable (HumaLOG) 16 Unit(s) SubCutaneous three times a day before meals  isosorbide   dinitrate Tablet (ISORDIL) 10 milliGRAM(s) Oral three times a day  levothyroxine 50 MICROGram(s) Oral daily  montelukast 10 milliGRAM(s) Oral daily  pantoprazole    Tablet 40 milliGRAM(s) Oral before breakfast  senna 2 Tablet(s) Oral at bedtime  spironolactone 25 milliGRAM(s) Oral daily  ticagrelor 90 milliGRAM(s) Oral two times a day    PRN Inpatient Medications  acetaminophen   Tablet .. 650 milliGRAM(s) Oral every 6 hours PRN  dextrose 40% Gel 15 Gram(s) Oral once PRN  glucagon  Injectable 1 milliGRAM(s) IntraMuscular once PRN      REVIEW OF SYSTEMS  --------------------------------------------------------------------------------  General: no fever  CVS: no chest pain  RESP: intermittent sob, no cough  MSK: no edema     VITALS/PHYSICAL EXAM  --------------------------------------------------------------------------------  T(C): 36.4 (06-12-19 @ 12:36), Max: 36.7 (06-11-19 @ 21:23)  HR: 79 (06-12-19 @ 12:36) (75 - 90)  BP: 102/62 (06-12-19 @ 12:36) (97/54 - 110/64)  RR: 20 (06-12-19 @ 12:36) (18 - 20)  SpO2: 96% (06-12-19 @ 12:36) (95% - 100%)  Wt(kg): --        06-11-19 @ 07:01  -  06-12-19 @ 07:00  --------------------------------------------------------  IN: 760 mL / OUT: 1250 mL / NET: -490 mL      Physical Exam:  	Gen: NAD  	HEENT: MMM  	Pulm: CTA B/L  	CV: S1S2  	Abd: Soft, +BS  	Ext: No LE edema B/L                      Neuro: Awake   	Skin: Warm and Dry   	 no polo    LABS/STUDIES  --------------------------------------------------------------------------------              9.2    12.3  >-----------<  483      [06-12-19 @ 06:41]              27.3     137  |  101  |  70  ----------------------------<  125      [06-12-19 @ 06:41]  3.8   |  19  |  2.26        Ca     9.8     [06-12-19 @ 06:41]      PT/INR: PT 12.3 , INR 1.07       [06-12-19 @ 12:34]  PTT: 49.8       [06-12-19 @ 12:34]      Creatinine Trend:  SCr 2.26 [06-12 @ 06:41]  SCr 2.28 [06-11 @ 06:45]  SCr 2.18 [06-10 @ 06:41]  SCr 2.25 [06-09 @ 06:16]  SCr 2.44 [06-08 @ 06:08]    Urinalysis - [06-12-19 @ 12:34]      Color Light Yellow / Appearance Clear / SG 1.014 / pH 6.0      Gluc Negative / Ketone Negative  / Bili Negative / Urobili Negative       Blood Negative / Protein Trace / Leuk Est Large / Nitrite Positive      RBC 1 / WBC 40 / Hyaline 0 / Gran  / Sq Epi  / Non Sq Epi 1 / Bacteria Many      .4 (Ca --)      [04-25-19 @ 05:45]   --  PTH 43.55 (Ca --)      [09-10-18 @ 07:15]   --  PTH 36.60 (Ca --)      [09-08-18 @ 03:15]   --  Vitamin D (25OH) 25.9      [05-01-19 @ 04:00]  HbA1c 7.8      [06-01-19 @ 08:00]  TSH 0.75      [06-01-19 @ 08:00]  Lipid: chol 148, , HDL 35,       [06-01-19 @ 08:00]

## 2019-06-12 NOTE — PROGRESS NOTE ADULT - ASSESSMENT
72 y/o female with PMHx of HTN, DM, CAD s/p CABG (LIMA to LAD, SVG to OM, SVG to PDA at [facility] 2014), HFrEF (LVEF 25%) severe MR, severe pulm HTN, hypothyroidism, admitted to [facility] for SOB, now transferred to [facility] for Alina Clip Eval    MERVIN  Likely sec to CHF/renal vasocongestion   Renal function fluctuates slightly likely sec to CHF.   Monitor bmp  Diuretics per cardio  IF renal function worsens, would hold off Spirinolactone   Avoid nephrotoxic agents      CKD stage 3  baseline cr 1.5-1.8  likely sec to HTN/DM/chf  Monitor renal function     SOB  likely sec to HF   Monitor daily weight and i/o  Diuretics per cardio  Patient with severe MR await Mitral Clip eval - Follow up CTS. planned for 6/13

## 2019-06-12 NOTE — PROGRESS NOTE ADULT - PROBLEM SELECTOR PLAN 1
CHF (congestive heart failure), acute on chronic  H/O HFrEF (LVEF 25%) severe MR, severe pulm HTN transferred for clip at [facility].  Recent RHC 5/9/19 showed RA 14, V 71/17, PA 69/20/41, PCWP 27, PA sat 39%, CO3.36, CI 2.37, SVR 1825. PVR 4.17.       hold BB at this time in the setting ADHF.

## 2019-06-12 NOTE — PROGRESS NOTE ADULT - ASSESSMENT
72 y/o female with pmhx of HTN, DM, CAD s/p CABG, HFrEF (LVEF 25%) severe MR, severe pulm HTN presents from [place] [facility] with shortness of breath, increased wob and fatigue. Admitted to CCU for management of ADHF, now pending transfer to [facility] for mitral clip evaluation.

## 2019-06-12 NOTE — PROGRESS NOTE ADULT - SUBJECTIVE AND OBJECTIVE BOX
Patient is a 73y old  Female who presents with a chief complaint of SOB (2019 13:35)    SUBJECTIVE / OVERNIGHT EVENTS: pt denies chest pain, shortness of breath , pts family next to pts bed    MEDICATIONS  (STANDING):  aspirin enteric coated 81 milliGRAM(s) Oral daily  atorvastatin 40 milliGRAM(s) Oral at bedtime  buMETAnide 4 milliGRAM(s) Oral every 12 hours  cefuroxime  IVPB 1500 milliGRAM(s) IV Intermittent once  chlorhexidine 0.12% Liquid 30 milliLiter(s) Swish and Spit once  chlorhexidine 4% Liquid 1 Application(s) Topical once  ciprofloxacin     Tablet 250 milliGRAM(s) Oral every 24 hours  dextrose 5%. 1000 milliLiter(s) (50 mL/Hr) IV Continuous <Continuous>  dextrose 50% Injectable 12.5 Gram(s) IV Push once  dextrose 50% Injectable 25 Gram(s) IV Push once  dextrose 50% Injectable 25 Gram(s) IV Push once  docusate sodium 100 milliGRAM(s) Oral two times a day  heparin  Injectable 5000 Unit(s) SubCutaneous every 12 hours  hydrALAZINE 20 milliGRAM(s) Oral three times a day  insulin glargine Injectable (LANTUS) 50 Unit(s) SubCutaneous at bedtime  insulin lispro (HumaLOG) corrective regimen sliding scale   SubCutaneous three times a day before meals  insulin lispro (HumaLOG) corrective regimen sliding scale   SubCutaneous at bedtime  insulin lispro Injectable (HumaLOG) 16 Unit(s) SubCutaneous three times a day before meals  isosorbide   dinitrate Tablet (ISORDIL) 10 milliGRAM(s) Oral three times a day  levothyroxine 50 MICROGram(s) Oral daily  montelukast 10 milliGRAM(s) Oral daily  pantoprazole    Tablet 40 milliGRAM(s) Oral before breakfast  senna 2 Tablet(s) Oral at bedtime  spironolactone 25 milliGRAM(s) Oral daily  ticagrelor 90 milliGRAM(s) Oral two times a day    MEDICATIONS  (PRN):  acetaminophen   Tablet .. 650 milliGRAM(s) Oral every 6 hours PRN Temp greater or equal to 38C (100.4F), Moderate Pain (4 - 6)  dextrose 40% Gel 15 Gram(s) Oral once PRN Blood Glucose LESS THAN 70 milliGRAM(s)/deciliter  glucagon  Injectable 1 milliGRAM(s) IntraMuscular once PRN Glucose LESS THAN 70 milligrams/deciliter    Vital Signs Last 24 Hrs  T(C): 36.4 (2019 12:36), Max: 36.7 (2019 21:23)  T(F): 97.6 (2019 12:36), Max: 98 (2019 21:23)  HR: 80 (2019 18:12) (75 - 90)  BP: 113/64 (2019 18:12) (97/54 - 113/64)  BP(mean): --  RR: 20 (2019 12:36) (18 - 20)  SpO2: 96% (2019 12:36) (95% - 100%)    PHYSICAL EXAM:  GENERAL: NAD, well-developed  HEAD:  Atraumatic, Normocephalic  EYES: EOMI, conjunctiva and sclera clear  NECK: Supple, No JVD  CHEST/LUNG: Mild b/l rales at bases  HEART: Regular rate and rhythm; No murmurs, rubs, or gallops  ABDOMEN: Soft, Nontender, Nondistended; Bowel sounds present  EXTREMITIES:  2+ Peripheral Pulses, No clubbing, cyanosis, or edema  NEUROLOGY: non-focal  SKIN: No rashes or lesions    LLABS:  -    137  |  101  |  70<H>  ----------------------------<  125<H>  3.8   |  19<L>  |  2.26<H>    Ca    9.8      2019 06:41      Creatinine Trend: 2.26 <--, 2.28 <--, 2.18 <--, 2.25 <--, 2.44 <--, 2.34 <--, 2.40 <--                        9.2    12.3  )-----------( 483      ( 2019 06:41 )             27.3     Urine Studies:  Urinalysis Basic - ( 2019 12:34 )    Color: Light Yellow / Appearance: Clear / S.014 / pH:   Gluc:  / Ketone: Negative  / Bili: Negative / Urobili: Negative   Blood:  / Protein: Trace / Nitrite: Positive   Leuk Esterase: Large / RBC: 1 /hpf / WBC 40 /HPF   Sq Epi:  / Non Sq Epi: 1 /hpf / Bacteria: Many                PT/INR - ( 2019 12:34 )   PT: 12.3 sec;   INR: 1.07 ratio         PTT - ( 2019 12:34 )  PTT:49.8 sec

## 2019-06-12 NOTE — PROGRESS NOTE ADULT - ASSESSMENT
73 F PMH HTN, T2DM, CAD s/p CABG (LIMA to LAD, SVG to OM, SVG to PDA at [facility] 2014), HFrEF (LVEF 25%) severe MR, severe pulm HTN, hypothyroidism, [facility] on [facility]/1/19 with sob, increased wob, f/w ADHF and severe MR, cardiorenal syndrome with duncan on ckd, now s/p IV diuresis and transferred over for eval for mitral clip procedure in the setting of recurrent readmissions for ADHF exacerbation.

## 2019-06-12 NOTE — PROGRESS NOTE ADULT - ASSESSMENT
73 F PMH HTN, T2DM, CAD s/p CABG (LIMA to LAD, SVG to OM, SVG to PDA at [facility] 2014), HFrEF (LVEF 25%) severe MR, severe pulm HTN, hypothyroidism, [facility] on 6/1/19 with sob, increased wob, f/w ADHF and severe MR, cardiorenal syndrome with duncan on ckd, now s/p IV diuresis and transferred over for eval for mitral clip procedure in the setting of recurrent readmissions for ADHF exacerbation.

## 2019-06-12 NOTE — PROGRESS NOTE ADULT - PROBLEM SELECTOR PLAN 1
NPO after midnight  Scheduled for mitraclip 6/13 with Dr. Leger and Dr. Newman  Continue on current diuretic regimen  Daily weights and strict I&O

## 2019-06-13 LAB
ALBUMIN SERPL ELPH-MCNC: 3.8 G/DL — SIGNIFICANT CHANGE UP (ref 3.3–5)
ALP SERPL-CCNC: 106 U/L — SIGNIFICANT CHANGE UP (ref 40–120)
ALT FLD-CCNC: 20 U/L — SIGNIFICANT CHANGE UP (ref 10–45)
ANION GAP SERPL CALC-SCNC: 16 MMOL/L — SIGNIFICANT CHANGE UP (ref 5–17)
ANION GAP SERPL CALC-SCNC: 17 MMOL/L — SIGNIFICANT CHANGE UP (ref 5–17)
APTT BLD: 36.2 SEC — SIGNIFICANT CHANGE UP (ref 27.5–36.3)
APTT BLD: >200 SEC — CRITICAL HIGH (ref 27.5–36.3)
AST SERPL-CCNC: 21 U/L — SIGNIFICANT CHANGE UP (ref 10–40)
BASOPHILS # BLD AUTO: 0 K/UL — SIGNIFICANT CHANGE UP (ref 0–0.2)
BILIRUB SERPL-MCNC: 0.2 MG/DL — SIGNIFICANT CHANGE UP (ref 0.2–1.2)
BUN SERPL-MCNC: 64 MG/DL — HIGH (ref 7–23)
BUN SERPL-MCNC: 65 MG/DL — HIGH (ref 7–23)
CALCIUM SERPL-MCNC: 9.2 MG/DL — SIGNIFICANT CHANGE UP (ref 8.4–10.5)
CALCIUM SERPL-MCNC: 9.9 MG/DL — SIGNIFICANT CHANGE UP (ref 8.4–10.5)
CHLORIDE SERPL-SCNC: 101 MMOL/L — SIGNIFICANT CHANGE UP (ref 96–108)
CHLORIDE SERPL-SCNC: 103 MMOL/L — SIGNIFICANT CHANGE UP (ref 96–108)
CO2 SERPL-SCNC: 18 MMOL/L — LOW (ref 22–31)
CO2 SERPL-SCNC: 20 MMOL/L — LOW (ref 22–31)
CREAT SERPL-MCNC: 2.3 MG/DL — HIGH (ref 0.5–1.3)
CREAT SERPL-MCNC: 2.37 MG/DL — HIGH (ref 0.5–1.3)
EOSINOPHIL # BLD AUTO: 1.1 K/UL — HIGH (ref 0–0.5)
EOSINOPHIL NFR BLD AUTO: 4 % — SIGNIFICANT CHANGE UP (ref 0–6)
FIBRINOGEN PPP-MCNC: 856 MG/DL — HIGH (ref 350–510)
GAS PNL BLDA: SIGNIFICANT CHANGE UP
GLUCOSE BLDC GLUCOMTR-MCNC: 141 MG/DL — HIGH (ref 70–99)
GLUCOSE BLDC GLUCOMTR-MCNC: 166 MG/DL — HIGH (ref 70–99)
GLUCOSE BLDC GLUCOMTR-MCNC: 175 MG/DL — HIGH (ref 70–99)
GLUCOSE BLDC GLUCOMTR-MCNC: 179 MG/DL — HIGH (ref 70–99)
GLUCOSE BLDC GLUCOMTR-MCNC: 207 MG/DL — HIGH (ref 70–99)
GLUCOSE BLDC GLUCOMTR-MCNC: 74 MG/DL — SIGNIFICANT CHANGE UP (ref 70–99)
GLUCOSE BLDC GLUCOMTR-MCNC: 99 MG/DL — SIGNIFICANT CHANGE UP (ref 70–99)
GLUCOSE SERPL-MCNC: 156 MG/DL — HIGH (ref 70–99)
GLUCOSE SERPL-MCNC: 232 MG/DL — HIGH (ref 70–99)
HCT VFR BLD CALC: 26.5 % — LOW (ref 34.5–45)
HCT VFR BLD CALC: 26.7 % — LOW (ref 34.5–45)
HGB BLD-MCNC: 8.8 G/DL — LOW (ref 11.5–15.5)
HGB BLD-MCNC: 8.8 G/DL — LOW (ref 11.5–15.5)
INR BLD: 1.18 RATIO — HIGH (ref 0.88–1.16)
INR BLD: 1.63 RATIO — HIGH (ref 0.88–1.16)
LYMPHOCYTES # BLD AUTO: 1.9 K/UL — SIGNIFICANT CHANGE UP (ref 1–3.3)
LYMPHOCYTES # BLD AUTO: 12 % — LOW (ref 13–44)
MCHC RBC-ENTMCNC: 28 PG — SIGNIFICANT CHANGE UP (ref 27–34)
MCHC RBC-ENTMCNC: 28.3 PG — SIGNIFICANT CHANGE UP (ref 27–34)
MCHC RBC-ENTMCNC: 33.1 GM/DL — SIGNIFICANT CHANGE UP (ref 32–36)
MCHC RBC-ENTMCNC: 33.3 GM/DL — SIGNIFICANT CHANGE UP (ref 32–36)
MCV RBC AUTO: 84.1 FL — SIGNIFICANT CHANGE UP (ref 80–100)
MCV RBC AUTO: 85.5 FL — SIGNIFICANT CHANGE UP (ref 80–100)
METAMYELOCYTES # FLD: 1 % — HIGH (ref 0–0)
MONOCYTES # BLD AUTO: 0.9 K/UL — SIGNIFICANT CHANGE UP (ref 0–0.9)
MONOCYTES NFR BLD AUTO: 6 % — SIGNIFICANT CHANGE UP (ref 2–14)
MYELOCYTES NFR BLD: 1 % — HIGH (ref 0–0)
NEUTROPHILS # BLD AUTO: 9.1 K/UL — HIGH (ref 1.8–7.4)
NEUTROPHILS NFR BLD AUTO: 76 % — SIGNIFICANT CHANGE UP (ref 43–77)
PLAT MORPH BLD: NORMAL — SIGNIFICANT CHANGE UP
PLATELET # BLD AUTO: 439 K/UL — HIGH (ref 150–400)
PLATELET # BLD AUTO: 484 K/UL — HIGH (ref 150–400)
POTASSIUM SERPL-MCNC: 4 MMOL/L — SIGNIFICANT CHANGE UP (ref 3.5–5.3)
POTASSIUM SERPL-MCNC: 4.1 MMOL/L — SIGNIFICANT CHANGE UP (ref 3.5–5.3)
POTASSIUM SERPL-SCNC: 4 MMOL/L — SIGNIFICANT CHANGE UP (ref 3.5–5.3)
POTASSIUM SERPL-SCNC: 4.1 MMOL/L — SIGNIFICANT CHANGE UP (ref 3.5–5.3)
PROT SERPL-MCNC: 8.1 G/DL — SIGNIFICANT CHANGE UP (ref 6–8.3)
PROTHROM AB SERPL-ACNC: 13.5 SEC — HIGH (ref 10–12.9)
PROTHROM AB SERPL-ACNC: 18.9 SEC — HIGH (ref 10–12.9)
RBC # BLD: 3.13 M/UL — LOW (ref 3.8–5.2)
RBC # BLD: 3.15 M/UL — LOW (ref 3.8–5.2)
RBC # FLD: 15.5 % — HIGH (ref 10.3–14.5)
RBC # FLD: 15.5 % — HIGH (ref 10.3–14.5)
RBC BLD AUTO: SIGNIFICANT CHANGE UP
RH IG SCN BLD-IMP: POSITIVE — SIGNIFICANT CHANGE UP
SODIUM SERPL-SCNC: 136 MMOL/L — SIGNIFICANT CHANGE UP (ref 135–145)
SODIUM SERPL-SCNC: 139 MMOL/L — SIGNIFICANT CHANGE UP (ref 135–145)
WBC # BLD: 13 K/UL — HIGH (ref 3.8–10.5)
WBC # BLD: 9.9 K/UL — SIGNIFICANT CHANGE UP (ref 3.8–10.5)
WBC # FLD AUTO: 13 K/UL — HIGH (ref 3.8–10.5)
WBC # FLD AUTO: 9.9 K/UL — SIGNIFICANT CHANGE UP (ref 3.8–10.5)

## 2019-06-13 PROCEDURE — 71045 X-RAY EXAM CHEST 1 VIEW: CPT | Mod: 26

## 2019-06-13 PROCEDURE — 93010 ELECTROCARDIOGRAM REPORT: CPT

## 2019-06-13 PROCEDURE — 93355 ECHO TRANSESOPHAGEAL (TEE): CPT

## 2019-06-13 PROCEDURE — 99291 CRITICAL CARE FIRST HOUR: CPT

## 2019-06-13 PROCEDURE — 33418 REPAIR TCAT MITRAL VALVE: CPT | Mod: Q0

## 2019-06-13 RX ORDER — CHLORHEXIDINE GLUCONATE 213 G/1000ML
5 SOLUTION TOPICAL EVERY 4 HOURS
Refills: 0 | Status: DISCONTINUED | OUTPATIENT
Start: 2019-06-13 | End: 2019-06-14

## 2019-06-13 RX ORDER — OXYCODONE AND ACETAMINOPHEN 5; 325 MG/1; MG/1
2 TABLET ORAL EVERY 6 HOURS
Refills: 0 | Status: DISCONTINUED | OUTPATIENT
Start: 2019-06-13 | End: 2019-06-16

## 2019-06-13 RX ORDER — HYDRALAZINE HCL 50 MG
10 TABLET ORAL ONCE
Refills: 0 | Status: COMPLETED | OUTPATIENT
Start: 2019-06-13 | End: 2019-06-13

## 2019-06-13 RX ORDER — BUMETANIDE 0.25 MG/ML
2 INJECTION INTRAMUSCULAR; INTRAVENOUS EVERY 12 HOURS
Refills: 0 | Status: DISCONTINUED | OUTPATIENT
Start: 2019-06-13 | End: 2019-06-16

## 2019-06-13 RX ORDER — ACETAMINOPHEN 500 MG
750 TABLET ORAL ONCE
Refills: 0 | Status: COMPLETED | OUTPATIENT
Start: 2019-06-13 | End: 2019-06-13

## 2019-06-13 RX ORDER — POTASSIUM CHLORIDE 20 MEQ
40 PACKET (EA) ORAL ONCE
Refills: 0 | Status: COMPLETED | OUTPATIENT
Start: 2019-06-13 | End: 2019-06-13

## 2019-06-13 RX ORDER — PANTOPRAZOLE SODIUM 20 MG/1
40 TABLET, DELAYED RELEASE ORAL DAILY
Refills: 0 | Status: DISCONTINUED | OUTPATIENT
Start: 2019-06-13 | End: 2019-06-14

## 2019-06-13 RX ORDER — PROTAMINE SULFATE 10 MG/ML
25 AMPUL (ML) INTRAVENOUS ONCE
Refills: 0 | Status: DISCONTINUED | OUTPATIENT
Start: 2019-06-13 | End: 2019-06-13

## 2019-06-13 RX ORDER — CEFUROXIME AXETIL 250 MG
1500 TABLET ORAL EVERY 12 HOURS
Refills: 0 | Status: COMPLETED | OUTPATIENT
Start: 2019-06-13 | End: 2019-06-14

## 2019-06-13 RX ORDER — FENTANYL CITRATE 50 UG/ML
25 INJECTION INTRAVENOUS ONCE
Refills: 0 | Status: DISCONTINUED | OUTPATIENT
Start: 2019-06-13 | End: 2019-06-13

## 2019-06-13 RX ORDER — POTASSIUM CHLORIDE 20 MEQ
10 PACKET (EA) ORAL
Refills: 0 | Status: COMPLETED | OUTPATIENT
Start: 2019-06-13 | End: 2019-06-13

## 2019-06-13 RX ORDER — SODIUM CHLORIDE 9 MG/ML
1000 INJECTION INTRAMUSCULAR; INTRAVENOUS; SUBCUTANEOUS
Refills: 0 | Status: DISCONTINUED | OUTPATIENT
Start: 2019-06-13 | End: 2019-06-19

## 2019-06-13 RX ORDER — LEVOTHYROXINE SODIUM 125 MCG
25 TABLET ORAL AT BEDTIME
Refills: 0 | Status: DISCONTINUED | OUTPATIENT
Start: 2019-06-13 | End: 2019-06-14

## 2019-06-13 RX ORDER — ALBUMIN HUMAN 25 %
250 VIAL (ML) INTRAVENOUS ONCE
Refills: 0 | Status: COMPLETED | OUTPATIENT
Start: 2019-06-13 | End: 2019-06-13

## 2019-06-13 RX ORDER — OXYCODONE AND ACETAMINOPHEN 5; 325 MG/1; MG/1
1 TABLET ORAL EVERY 4 HOURS
Refills: 0 | Status: DISCONTINUED | OUTPATIENT
Start: 2019-06-13 | End: 2019-06-16

## 2019-06-13 RX ORDER — INSULIN LISPRO 100/ML
VIAL (ML) SUBCUTANEOUS AT BEDTIME
Refills: 0 | Status: DISCONTINUED | OUTPATIENT
Start: 2019-06-13 | End: 2019-06-19

## 2019-06-13 RX ORDER — POTASSIUM CHLORIDE 20 MEQ
10 PACKET (EA) ORAL
Refills: 0 | Status: DISCONTINUED | OUTPATIENT
Start: 2019-06-13 | End: 2019-06-13

## 2019-06-13 RX ORDER — MEPERIDINE HYDROCHLORIDE 50 MG/ML
25 INJECTION INTRAMUSCULAR; INTRAVENOUS; SUBCUTANEOUS ONCE
Refills: 0 | Status: DISCONTINUED | OUTPATIENT
Start: 2019-06-13 | End: 2019-06-13

## 2019-06-13 RX ORDER — PROTAMINE SULFATE 10 MG/ML
25 AMPUL (ML) INTRAVENOUS ONCE
Refills: 0 | Status: COMPLETED | OUTPATIENT
Start: 2019-06-13 | End: 2019-06-13

## 2019-06-13 RX ORDER — INSULIN LISPRO 100/ML
VIAL (ML) SUBCUTANEOUS
Refills: 0 | Status: DISCONTINUED | OUTPATIENT
Start: 2019-06-13 | End: 2019-06-19

## 2019-06-13 RX ORDER — INSULIN HUMAN 100 [IU]/ML
2 INJECTION, SOLUTION SUBCUTANEOUS
Qty: 100 | Refills: 0 | Status: DISCONTINUED | OUTPATIENT
Start: 2019-06-13 | End: 2019-06-14

## 2019-06-13 RX ADMIN — Medication 50 MICROGRAM(S): at 05:51

## 2019-06-13 RX ADMIN — Medication 20 MILLIGRAM(S): at 05:51

## 2019-06-13 RX ADMIN — CHLORHEXIDINE GLUCONATE 30 MILLILITER(S): 213 SOLUTION TOPICAL at 08:10

## 2019-06-13 RX ADMIN — SENNA PLUS 2 TABLET(S): 8.6 TABLET ORAL at 21:15

## 2019-06-13 RX ADMIN — Medication 125 MILLILITER(S): at 23:01

## 2019-06-13 RX ADMIN — SPIRONOLACTONE 25 MILLIGRAM(S): 25 TABLET, FILM COATED ORAL at 05:51

## 2019-06-13 RX ADMIN — CHLORHEXIDINE GLUCONATE 1 APPLICATION(S): 213 SOLUTION TOPICAL at 07:00

## 2019-06-13 RX ADMIN — Medication 650 MILLIGRAM(S): at 21:15

## 2019-06-13 RX ADMIN — PANTOPRAZOLE SODIUM 40 MILLIGRAM(S): 20 TABLET, DELAYED RELEASE ORAL at 05:51

## 2019-06-13 RX ADMIN — ISOSORBIDE DINITRATE 10 MILLIGRAM(S): 5 TABLET ORAL at 05:50

## 2019-06-13 RX ADMIN — BUMETANIDE 4 MILLIGRAM(S): 0.25 INJECTION INTRAMUSCULAR; INTRAVENOUS at 05:50

## 2019-06-13 RX ADMIN — CHLORHEXIDINE GLUCONATE 5 MILLILITER(S): 213 SOLUTION TOPICAL at 18:03

## 2019-06-13 RX ADMIN — BUMETANIDE 2 MILLIGRAM(S): 0.25 INJECTION INTRAMUSCULAR; INTRAVENOUS at 17:17

## 2019-06-13 RX ADMIN — Medication 125 MILLILITER(S): at 23:48

## 2019-06-13 RX ADMIN — Medication 210 MILLIGRAM(S): at 16:05

## 2019-06-13 RX ADMIN — ATORVASTATIN CALCIUM 40 MILLIGRAM(S): 80 TABLET, FILM COATED ORAL at 21:15

## 2019-06-13 RX ADMIN — PANTOPRAZOLE SODIUM 40 MILLIGRAM(S): 20 TABLET, DELAYED RELEASE ORAL at 14:26

## 2019-06-13 RX ADMIN — Medication 650 MILLIGRAM(S): at 23:17

## 2019-06-13 RX ADMIN — Medication 100 MILLIGRAM(S): at 05:51

## 2019-06-13 RX ADMIN — Medication 81 MILLIGRAM(S): at 18:03

## 2019-06-13 RX ADMIN — Medication 25 MICROGRAM(S): at 21:42

## 2019-06-13 RX ADMIN — TICAGRELOR 90 MILLIGRAM(S): 90 TABLET ORAL at 18:13

## 2019-06-13 RX ADMIN — Medication 1: at 08:08

## 2019-06-13 RX ADMIN — TICAGRELOR 90 MILLIGRAM(S): 90 TABLET ORAL at 05:51

## 2019-06-13 RX ADMIN — CHLORHEXIDINE GLUCONATE 5 MILLILITER(S): 213 SOLUTION TOPICAL at 14:26

## 2019-06-13 RX ADMIN — INSULIN HUMAN 2 UNIT(S)/HR: 100 INJECTION, SOLUTION SUBCUTANEOUS at 14:27

## 2019-06-13 RX ADMIN — MONTELUKAST 10 MILLIGRAM(S): 4 TABLET, CHEWABLE ORAL at 21:42

## 2019-06-13 RX ADMIN — Medication 40 MILLIEQUIVALENT(S): at 21:14

## 2019-06-13 RX ADMIN — INSULIN GLARGINE 50 UNIT(S): 100 INJECTION, SOLUTION SUBCUTANEOUS at 23:15

## 2019-06-13 RX ADMIN — Medication 100 MILLIGRAM(S): at 18:03

## 2019-06-13 RX ADMIN — HEPARIN SODIUM 5000 UNIT(S): 5000 INJECTION INTRAVENOUS; SUBCUTANEOUS at 05:50

## 2019-06-13 NOTE — PROGRESS NOTE ADULT - SUBJECTIVE AND OBJECTIVE BOX
SELVIN CLAIRE   MRN#: [id number]     The patient is a 73y Female who was seen, evaluated, & examined with the CTICU staff on rounds and later in the day with a multidisciplinary care plan formulated & implemented.  All available clinical, laboratory, radiographic, pharmacologic, and electrocardiographic data were reviewed & analyzed.      The patient was in the CTICU in critical condition secondary to persistent cardiopulmonary dysfunction S/P Mitral clip.     Respiratory status required full ventilatory support, the following of ABG’s with A-line monitoring, and continuous pulse oximetry monitoring for support & to evaluate for & prevent further decompensation secondary to persistent cardiopulmonary dysfunction.  Patient requires weaning and extubation.    Invasive hemodynamic monitoring with an A-line was required for the following of continuous MAP/BP monitoring to ensure adequate cardiovascular support and to evaluate for & help prevent decompensation.    Patient required critical care management and I provided 30 minutes of non-continuous care to the patient.  Discussed at length with the CTICU staff and helped coordinate care.

## 2019-06-13 NOTE — AIRWAY REMOVAL NOTE  ADULT & PEDS - ARTIFICAL AIRWAY REMOVAL COMMENTS
Written order for extubation verified. The patient was identified by full name and birth date compared to the identification band. Present during the procedure was RJ Desai

## 2019-06-14 DIAGNOSIS — Z98.890 OTHER SPECIFIED POSTPROCEDURAL STATES: ICD-10-CM

## 2019-06-14 LAB
ALBUMIN SERPL ELPH-MCNC: 4.1 G/DL — SIGNIFICANT CHANGE UP (ref 3.3–5)
ALP SERPL-CCNC: 85 U/L — SIGNIFICANT CHANGE UP (ref 40–120)
ALT FLD-CCNC: 14 U/L — SIGNIFICANT CHANGE UP (ref 10–45)
ANION GAP SERPL CALC-SCNC: 16 MMOL/L — SIGNIFICANT CHANGE UP (ref 5–17)
APPEARANCE UR: CLEAR — SIGNIFICANT CHANGE UP
APTT BLD: 36.8 SEC — HIGH (ref 27.5–36.3)
AST SERPL-CCNC: 14 U/L — SIGNIFICANT CHANGE UP (ref 10–40)
BASOPHILS # BLD AUTO: 0.1 K/UL — SIGNIFICANT CHANGE UP (ref 0–0.2)
BASOPHILS NFR BLD AUTO: 0.6 % — SIGNIFICANT CHANGE UP (ref 0–2)
BILIRUB SERPL-MCNC: 0.2 MG/DL — SIGNIFICANT CHANGE UP (ref 0.2–1.2)
BILIRUB UR-MCNC: NEGATIVE — SIGNIFICANT CHANGE UP
BUN SERPL-MCNC: 61 MG/DL — HIGH (ref 7–23)
CALCIUM SERPL-MCNC: 8.5 MG/DL — SIGNIFICANT CHANGE UP (ref 8.4–10.5)
CHLORIDE SERPL-SCNC: 104 MMOL/L — SIGNIFICANT CHANGE UP (ref 96–108)
CO2 SERPL-SCNC: 18 MMOL/L — LOW (ref 22–31)
COLOR SPEC: SIGNIFICANT CHANGE UP
CREAT SERPL-MCNC: 2.18 MG/DL — HIGH (ref 0.5–1.3)
DIFF PNL FLD: ABNORMAL
EOSINOPHIL # BLD AUTO: 0.3 K/UL — SIGNIFICANT CHANGE UP (ref 0–0.5)
EOSINOPHIL NFR BLD AUTO: 2.6 % — SIGNIFICANT CHANGE UP (ref 0–6)
GAS PNL BLDA: SIGNIFICANT CHANGE UP
GLUCOSE BLDC GLUCOMTR-MCNC: 123 MG/DL — HIGH (ref 70–99)
GLUCOSE BLDC GLUCOMTR-MCNC: 165 MG/DL — HIGH (ref 70–99)
GLUCOSE BLDC GLUCOMTR-MCNC: 170 MG/DL — HIGH (ref 70–99)
GLUCOSE BLDC GLUCOMTR-MCNC: 177 MG/DL — HIGH (ref 70–99)
GLUCOSE BLDC GLUCOMTR-MCNC: 217 MG/DL — HIGH (ref 70–99)
GLUCOSE BLDC GLUCOMTR-MCNC: 241 MG/DL — HIGH (ref 70–99)
GLUCOSE BLDC GLUCOMTR-MCNC: 39 MG/DL — CRITICAL LOW (ref 70–99)
GLUCOSE BLDC GLUCOMTR-MCNC: 60 MG/DL — LOW (ref 70–99)
GLUCOSE BLDC GLUCOMTR-MCNC: 61 MG/DL — LOW (ref 70–99)
GLUCOSE SERPL-MCNC: 137 MG/DL — HIGH (ref 70–99)
GLUCOSE UR QL: ABNORMAL
HCT VFR BLD CALC: 21.3 % — LOW (ref 34.5–45)
HCT VFR BLD CALC: 25.3 % — LOW (ref 34.5–45)
HCT VFR BLD CALC: 27.7 % — LOW (ref 34.5–45)
HGB BLD-MCNC: 7.4 G/DL — LOW (ref 11.5–15.5)
HGB BLD-MCNC: 8.8 G/DL — LOW (ref 11.5–15.5)
HGB BLD-MCNC: 9.4 G/DL — LOW (ref 11.5–15.5)
INR BLD: 1.18 RATIO — HIGH (ref 0.88–1.16)
KETONES UR-MCNC: NEGATIVE — SIGNIFICANT CHANGE UP
LEUKOCYTE ESTERASE UR-ACNC: ABNORMAL
LYMPHOCYTES # BLD AUTO: 1.1 K/UL — SIGNIFICANT CHANGE UP (ref 1–3.3)
LYMPHOCYTES # BLD AUTO: 10.2 % — LOW (ref 13–44)
MAGNESIUM SERPL-MCNC: 2 MG/DL — SIGNIFICANT CHANGE UP (ref 1.6–2.6)
MCHC RBC-ENTMCNC: 29 PG — SIGNIFICANT CHANGE UP (ref 27–34)
MCHC RBC-ENTMCNC: 29.7 PG — SIGNIFICANT CHANGE UP (ref 27–34)
MCHC RBC-ENTMCNC: 29.7 PG — SIGNIFICANT CHANGE UP (ref 27–34)
MCHC RBC-ENTMCNC: 34 GM/DL — SIGNIFICANT CHANGE UP (ref 32–36)
MCHC RBC-ENTMCNC: 34.5 GM/DL — SIGNIFICANT CHANGE UP (ref 32–36)
MCHC RBC-ENTMCNC: 34.7 GM/DL — SIGNIFICANT CHANGE UP (ref 32–36)
MCV RBC AUTO: 85.4 FL — SIGNIFICANT CHANGE UP (ref 80–100)
MCV RBC AUTO: 85.6 FL — SIGNIFICANT CHANGE UP (ref 80–100)
MCV RBC AUTO: 86 FL — SIGNIFICANT CHANGE UP (ref 80–100)
MONOCYTES # BLD AUTO: 0.9 K/UL — SIGNIFICANT CHANGE UP (ref 0–0.9)
MONOCYTES NFR BLD AUTO: 8 % — SIGNIFICANT CHANGE UP (ref 2–14)
NEUTROPHILS # BLD AUTO: 8.6 K/UL — HIGH (ref 1.8–7.4)
NEUTROPHILS NFR BLD AUTO: 78.5 % — HIGH (ref 43–77)
NITRITE UR-MCNC: NEGATIVE — SIGNIFICANT CHANGE UP
PH UR: 5.5 — SIGNIFICANT CHANGE UP (ref 5–8)
PLATELET # BLD AUTO: 341 K/UL — SIGNIFICANT CHANGE UP (ref 150–400)
PLATELET # BLD AUTO: 349 K/UL — SIGNIFICANT CHANGE UP (ref 150–400)
PLATELET # BLD AUTO: 373 K/UL — SIGNIFICANT CHANGE UP (ref 150–400)
POTASSIUM SERPL-MCNC: 4.5 MMOL/L — SIGNIFICANT CHANGE UP (ref 3.5–5.3)
POTASSIUM SERPL-SCNC: 4.5 MMOL/L — SIGNIFICANT CHANGE UP (ref 3.5–5.3)
PROT SERPL-MCNC: 7.6 G/DL — SIGNIFICANT CHANGE UP (ref 6–8.3)
PROT UR-MCNC: ABNORMAL
PROTHROM AB SERPL-ACNC: 13.5 SEC — HIGH (ref 10–12.9)
RBC # BLD: 2.48 M/UL — LOW (ref 3.8–5.2)
RBC # BLD: 2.96 M/UL — LOW (ref 3.8–5.2)
RBC # BLD: 3.24 M/UL — LOW (ref 3.8–5.2)
RBC # FLD: 15.2 % — HIGH (ref 10.3–14.5)
RBC # FLD: 15.2 % — HIGH (ref 10.3–14.5)
RBC # FLD: 15.7 % — HIGH (ref 10.3–14.5)
SODIUM SERPL-SCNC: 138 MMOL/L — SIGNIFICANT CHANGE UP (ref 135–145)
SP GR SPEC: 1.02 — SIGNIFICANT CHANGE UP (ref 1.01–1.02)
UROBILINOGEN FLD QL: NEGATIVE — SIGNIFICANT CHANGE UP
WBC # BLD: 10.9 K/UL — HIGH (ref 3.8–10.5)
WBC # BLD: 11.4 K/UL — HIGH (ref 3.8–10.5)
WBC # BLD: 12.4 K/UL — HIGH (ref 3.8–10.5)
WBC # FLD AUTO: 10.9 K/UL — HIGH (ref 3.8–10.5)
WBC # FLD AUTO: 11.4 K/UL — HIGH (ref 3.8–10.5)
WBC # FLD AUTO: 12.4 K/UL — HIGH (ref 3.8–10.5)

## 2019-06-14 PROCEDURE — 71045 X-RAY EXAM CHEST 1 VIEW: CPT | Mod: 26

## 2019-06-14 PROCEDURE — 99231 SBSQ HOSP IP/OBS SF/LOW 25: CPT

## 2019-06-14 PROCEDURE — 99232 SBSQ HOSP IP/OBS MODERATE 35: CPT

## 2019-06-14 PROCEDURE — 93010 ELECTROCARDIOGRAM REPORT: CPT

## 2019-06-14 RX ORDER — LEVOTHYROXINE SODIUM 125 MCG
50 TABLET ORAL DAILY
Refills: 0 | Status: DISCONTINUED | OUTPATIENT
Start: 2019-06-14 | End: 2019-06-19

## 2019-06-14 RX ORDER — PANTOPRAZOLE SODIUM 20 MG/1
40 TABLET, DELAYED RELEASE ORAL
Refills: 0 | Status: DISCONTINUED | OUTPATIENT
Start: 2019-06-14 | End: 2019-06-19

## 2019-06-14 RX ORDER — INSULIN GLARGINE 100 [IU]/ML
40 INJECTION, SOLUTION SUBCUTANEOUS AT BEDTIME
Refills: 0 | Status: DISCONTINUED | OUTPATIENT
Start: 2019-06-14 | End: 2019-06-17

## 2019-06-14 RX ORDER — PHENYLEPHRINE HYDROCHLORIDE 10 MG/ML
0.3 INJECTION INTRAVENOUS
Qty: 40 | Refills: 0 | Status: DISCONTINUED | OUTPATIENT
Start: 2019-06-14 | End: 2019-06-14

## 2019-06-14 RX ADMIN — Medication 2: at 13:19

## 2019-06-14 RX ADMIN — Medication 100 MILLIGRAM(S): at 06:04

## 2019-06-14 RX ADMIN — Medication 81 MILLIGRAM(S): at 11:42

## 2019-06-14 RX ADMIN — TICAGRELOR 90 MILLIGRAM(S): 90 TABLET ORAL at 17:03

## 2019-06-14 RX ADMIN — INSULIN GLARGINE 40 UNIT(S): 100 INJECTION, SOLUTION SUBCUTANEOUS at 22:15

## 2019-06-14 RX ADMIN — BUMETANIDE 2 MILLIGRAM(S): 0.25 INJECTION INTRAMUSCULAR; INTRAVENOUS at 06:04

## 2019-06-14 RX ADMIN — SENNA PLUS 2 TABLET(S): 8.6 TABLET ORAL at 22:15

## 2019-06-14 RX ADMIN — SPIRONOLACTONE 25 MILLIGRAM(S): 25 TABLET, FILM COATED ORAL at 06:04

## 2019-06-14 RX ADMIN — Medication 16 UNIT(S): at 13:19

## 2019-06-14 RX ADMIN — HEPARIN SODIUM 5000 UNIT(S): 5000 INJECTION INTRAVENOUS; SUBCUTANEOUS at 17:03

## 2019-06-14 RX ADMIN — Medication 100 MILLIGRAM(S): at 06:03

## 2019-06-14 RX ADMIN — Medication 1: at 10:11

## 2019-06-14 RX ADMIN — OXYCODONE AND ACETAMINOPHEN 1 TABLET(S): 5; 325 TABLET ORAL at 23:10

## 2019-06-14 RX ADMIN — Medication 20 MILLIGRAM(S): at 13:14

## 2019-06-14 RX ADMIN — TICAGRELOR 90 MILLIGRAM(S): 90 TABLET ORAL at 06:04

## 2019-06-14 RX ADMIN — MONTELUKAST 10 MILLIGRAM(S): 4 TABLET, CHEWABLE ORAL at 22:15

## 2019-06-14 RX ADMIN — PANTOPRAZOLE SODIUM 40 MILLIGRAM(S): 20 TABLET, DELAYED RELEASE ORAL at 10:11

## 2019-06-14 RX ADMIN — OXYCODONE AND ACETAMINOPHEN 1 TABLET(S): 5; 325 TABLET ORAL at 22:25

## 2019-06-14 RX ADMIN — Medication 1 TABLET(S): at 22:14

## 2019-06-14 RX ADMIN — PHENYLEPHRINE HYDROCHLORIDE 5.04 MICROGRAM(S)/KG/MIN: 10 INJECTION INTRAVENOUS at 00:32

## 2019-06-14 RX ADMIN — Medication 20 MILLIGRAM(S): at 22:15

## 2019-06-14 RX ADMIN — Medication 4: at 17:03

## 2019-06-14 RX ADMIN — ATORVASTATIN CALCIUM 40 MILLIGRAM(S): 80 TABLET, FILM COATED ORAL at 22:14

## 2019-06-14 RX ADMIN — Medication 16 UNIT(S): at 10:10

## 2019-06-14 RX ADMIN — CHLORHEXIDINE GLUCONATE 5 MILLILITER(S): 213 SOLUTION TOPICAL at 06:03

## 2019-06-14 RX ADMIN — BUMETANIDE 2 MILLIGRAM(S): 0.25 INJECTION INTRAMUSCULAR; INTRAVENOUS at 17:02

## 2019-06-14 RX ADMIN — ISOSORBIDE DINITRATE 10 MILLIGRAM(S): 5 TABLET ORAL at 22:15

## 2019-06-14 RX ADMIN — HEPARIN SODIUM 5000 UNIT(S): 5000 INJECTION INTRAVENOUS; SUBCUTANEOUS at 06:04

## 2019-06-14 RX ADMIN — ISOSORBIDE DINITRATE 10 MILLIGRAM(S): 5 TABLET ORAL at 13:14

## 2019-06-14 RX ADMIN — Medication 16 UNIT(S): at 17:03

## 2019-06-14 NOTE — PHYSICAL THERAPY INITIAL EVALUATION ADULT - GENERAL OBSERVATIONS, REHAB EVAL
Pt received supine in bed in NAD, VSS, + UE IV, + tele.
Pt encountered supine in bed, AO x 3,. + IV lock + tele

## 2019-06-14 NOTE — PROGRESS NOTE ADULT - SUBJECTIVE AND OBJECTIVE BOX
[facility]   MD RAHEEL SHAH MD RUORU WONG, PA    TEL:  OFFICE: [phone]  DR SANTOYO CELL: [phone]  JUSTEN KENDALL CELL: [phone]  DR. NGUYEN CELL: [phone]    RENAL FOLLOW UP NOTE  --------------------------------------------------------------------------------  HPI:      Pt seen and examined at bedside.  [facility]    PAST HISTORY  --------------------------------------------------------------------------------  No significant changes to PMH, PSH, FHx, SHx, unless otherwise noted    ALLERGIES & MEDICATIONS  --------------------------------------------------------------------------------  Allergies    azithromycin (Hives; Pruritus)    Intolerances      Standing Inpatient Medications  acetaminophen  IVPB .. 750 milliGRAM(s) IV Intermittent once  aspirin enteric coated 81 milliGRAM(s) Oral daily  atorvastatin 40 milliGRAM(s) Oral at bedtime  buMETAnide Injectable 2 milliGRAM(s) IV Push every 12 hours  chlorhexidine 0.12% Liquid 5 milliLiter(s) Oral Mucosa every 4 hours  dextrose 5%. 1000 milliLiter(s) IV Continuous <Continuous>  dextrose 50% Injectable 12.5 Gram(s) IV Push once  dextrose 50% Injectable 25 Gram(s) IV Push once  dextrose 50% Injectable 25 Gram(s) IV Push once  docusate sodium 100 milliGRAM(s) Oral two times a day  heparin  Injectable 5000 Unit(s) SubCutaneous every 12 hours  hydrALAZINE 20 milliGRAM(s) Oral three times a day  insulin glargine Injectable (LANTUS) 50 Unit(s) SubCutaneous at bedtime  insulin lispro (HumaLOG) corrective regimen sliding scale   SubCutaneous three times a day before meals  insulin lispro (HumaLOG) corrective regimen sliding scale   SubCutaneous at bedtime  insulin lispro (HumaLOG) corrective regimen sliding scale   SubCutaneous three times a day before meals  insulin lispro (HumaLOG) corrective regimen sliding scale   SubCutaneous at bedtime  insulin lispro Injectable (HumaLOG) 16 Unit(s) SubCutaneous three times a day before meals  insulin regular Infusion 2 Unit(s)/Hr IV Continuous <Continuous>  isosorbide   dinitrate Tablet (ISORDIL) 10 milliGRAM(s) Oral three times a day  levothyroxine Injectable 25 MICROGram(s) IV Push at bedtime  montelukast 10 milliGRAM(s) Oral daily  pantoprazole  Injectable 40 milliGRAM(s) IV Push daily  phenylephrine    Infusion 0.3 MICROgram(s)/kG/Min IV Continuous <Continuous>  senna 2 Tablet(s) Oral at bedtime  sodium chloride 0.9%. 1000 milliLiter(s) IV Continuous <Continuous>  spironolactone 25 milliGRAM(s) Oral daily  ticagrelor 90 milliGRAM(s) Oral two times a day    PRN Inpatient Medications  acetaminophen   Tablet .. 650 milliGRAM(s) Oral every 6 hours PRN  dextrose 40% Gel 15 Gram(s) Oral once PRN  glucagon  Injectable 1 milliGRAM(s) IntraMuscular once PRN  oxyCODONE    5 mG/acetaminophen 325 mG 1 Tablet(s) Oral every 4 hours PRN  oxyCODONE    5 mG/acetaminophen 325 mG 2 Tablet(s) Oral every 6 hours PRN      REVIEW OF SYSTEMS  --------------------------------------------------------------------------------  General: no fever    MSK: no edema     VITALS/PHYSICAL EXAM  --------------------------------------------------------------------------------  T(C): 36.1 (06-14-19 @ 07:00), Max: 37.2 (06-14-19 @ 02:30)  HR: 90 (06-14-19 @ 08:00) (78 - 102)  BP: 93/50 (06-13-19 @ 23:30) (84/51 - 102/63)  RR: 20 (06-14-19 @ 08:00) (12 - 28)  SpO2: 99% (06-14-19 @ 08:00) (97% - 100%)  Wt(kg): --  Height (cm): 149.86 (06-13-19 @ 08:35)  Weight (kg): 44.8 (06-13-19 @ 08:35)  BMI (kg/m2): 19.9 (06-13-19 @ 08:35)  BSA (m2): 1.37 (06-13-19 @ 08:35)      06-13-19 @ 07:01  -  06-14-19 @ 07:00  --------------------------------------------------------  IN: 1786 mL / OUT: 2205 mL / NET: -419 mL    06-14-19 @ 07:01  -  06-14-19 @ 08:16  --------------------------------------------------------  IN: 0 mL / OUT: 100 mL / NET: -100 mL      Physical Exam:  	Gen: NAD  	HEENT: MMM  	Pulm: CTA B/L  	CV: S1S2  	Abd: Soft, +BS  	Ext: No LE edema B/L                      Neuro: Awake   	Skin: Warm and Dry   	JUAN FRANCISCO cuellar    LABS/STUDIES  --------------------------------------------------------------------------------              8.8    10.9  >-----------<  349      [06-14-19 @ 05:47]              25.3     138  |  104  |  61  ----------------------------<  137      [06-14-19 @ 00:56]  4.5   |  18  |  2.18        Ca     8.5     [06-14-19 @ 00:56]      Mg     2.0     [06-14-19 @ 00:56]    TPro  7.6  /  Alb  4.1  /  TBili  0.2  /  DBili  x   /  AST  14  /  ALT  14  /  AlkPhos  85  [06-14-19 @ 00:56]    PT/INR: PT 13.5 , INR 1.18       [06-14-19 @ 00:56]  PTT: 36.8       [06-14-19 @ 00:56]      Creatinine Trend:  SCr 2.18 [06-14 @ 00:56]  SCr 2.30 [06-13 @ 13:39]  SCr 2.37 [06-13 @ 05:33]  SCr 2.26 [06-12 @ 06:41]  SCr 2.28 [06-11 @ 06:45]    Urinalysis - [06-12-19 @ 12:34]      Color Light Yellow / Appearance Clear / SG 1.014 / pH 6.0      Gluc Negative / Ketone Negative  / Bili Negative / Urobili Negative       Blood Negative / Protein Trace / Leuk Est Large / Nitrite Positive      RBC 1 / WBC 40 / Hyaline 0 / Gran  / Sq Epi  / Non Sq Epi 1 / Bacteria Many      .4 (Ca --)      [04-25-19 @ 05:45]   --  PTH 43.55 (Ca --)      [09-10-18 @ 07:15]   --  PTH 36.60 (Ca --)      [09-08-18 @ 03:15]   --  Vitamin D (25OH) 25.9      [05-01-19 @ 04:00]  HbA1c 7.8      [06-01-19 @ 08:00]  TSH 0.75      [06-01-19 @ 08:00]  Lipid: chol 148, , HDL 35,       [06-01-19 @ 08:00]

## 2019-06-14 NOTE — PHYSICAL THERAPY INITIAL EVALUATION ADULT - GAIT TRAINING, PT EVAL
Goal: Pt will amb 150 ft independently with least restrictive device in 2 weeks.
Goal: Pt will amb 150 ft independently with least restrictive device in 2 weeks.

## 2019-06-14 NOTE — PROGRESS NOTE ADULT - SUBJECTIVE AND OBJECTIVE BOX
*****Structural Heart Team*****    Subjective:    Patient is resting comfortably in a chair, offering no complaints. She states she ambulated earlier, and denied any SOB or CP    PAST MEDICAL & SURGICAL HISTORY:  GERD (gastroesophageal reflux disease)  CAD (coronary artery disease): s/p CABG  Hypothyroidism  Hyperlipidemia  Diabetes mellitus, type 2  S/P CABG (coronary artery bypass graft)        T(C): 36.4 (06-14-19 @ 11:07), Max: 37.2 (06-14-19 @ 02:30)  HR: 100 (06-14-19 @ 11:07) (80 - 100)  BP: 107/65 (06-14-19 @ 11:07) (84/51 - 107/65)  RR: 26 (06-14-19 @ 11:07) (12 - 34)  SpO2: 99% (06-14-19 @ 11:07) (97% - 100%)  Wt(kg): --  06-13 @ 07:01  -  06-14 @ 07:00  --------------------------------------------------------  IN: 1786 mL / OUT: 2205 mL / NET: -419 mL    06-14 @ 07:01  -  06-14 @ 12:59  --------------------------------------------------------  IN: 0 mL / OUT: 150 mL / NET: -150 mL      MEDICATIONS  (STANDING):  aspirin enteric coated 81 milliGRAM(s) Oral daily  atorvastatin 40 milliGRAM(s) Oral at bedtime  buMETAnide Injectable 2 milliGRAM(s) IV Push every 12 hours  dextrose 5%. 1000 milliLiter(s) (50 mL/Hr) IV Continuous <Continuous>  dextrose 50% Injectable 12.5 Gram(s) IV Push once  dextrose 50% Injectable 25 Gram(s) IV Push once  dextrose 50% Injectable 25 Gram(s) IV Push once  docusate sodium 100 milliGRAM(s) Oral two times a day  heparin  Injectable 5000 Unit(s) SubCutaneous every 12 hours  hydrALAZINE 20 milliGRAM(s) Oral three times a day  insulin glargine Injectable (LANTUS) 40 Unit(s) SubCutaneous at bedtime  insulin lispro (HumaLOG) corrective regimen sliding scale   SubCutaneous three times a day before meals  insulin lispro (HumaLOG) corrective regimen sliding scale   SubCutaneous at bedtime  insulin lispro (HumaLOG) corrective regimen sliding scale   SubCutaneous three times a day before meals  insulin lispro (HumaLOG) corrective regimen sliding scale   SubCutaneous at bedtime  insulin lispro Injectable (HumaLOG) 16 Unit(s) SubCutaneous three times a day before meals  isosorbide   dinitrate Tablet (ISORDIL) 10 milliGRAM(s) Oral three times a day  levothyroxine 50 MICROGram(s) Oral daily  montelukast 10 milliGRAM(s) Oral daily  pantoprazole    Tablet 40 milliGRAM(s) Oral before breakfast  senna 2 Tablet(s) Oral at bedtime  sodium chloride 0.9%. 1000 milliLiter(s) (10 mL/Hr) IV Continuous <Continuous>  spironolactone 25 milliGRAM(s) Oral daily  ticagrelor 90 milliGRAM(s) Oral two times a day    MEDICATIONS  (PRN):  acetaminophen   Tablet .. 650 milliGRAM(s) Oral every 6 hours PRN Temp greater or equal to 38C (100.4F)  dextrose 40% Gel 15 Gram(s) Oral once PRN Blood Glucose LESS THAN 70 milliGRAM(s)/deciliter  glucagon  Injectable 1 milliGRAM(s) IntraMuscular once PRN Glucose LESS THAN 70 milligrams/deciliter  oxyCODONE    5 mG/acetaminophen 325 mG 1 Tablet(s) Oral every 4 hours PRN Mild Pain (1 - 3)  oxyCODONE    5 mG/acetaminophen 325 mG 2 Tablet(s) Oral every 6 hours PRN Moderate Pain (4 - 6)      Review of Symptoms:  Constitutional: Awake, Alert, Follows commands  Respiratory: Denies SOB, Denies Cough  Cardiac: Denies CP, Denies Palpitations  Gastrointestinal: Denies Pain, Denies N/V, tolerating po intake  Vascular: Negative  Extremities: + Edema, No joint pain or swelling  Neurological: Negative  Endocrine: No heat or cold intolerance, No excessive thirst  Heme/Onc: Negative    Exam:  General: A/O x3, NAD  HEENT: Supple, No JVD, Trachea midline, no masses  Pulmonary: fine crackles at bases, = Chest Excursion, no accessory muscle use  Cor: S1S2, RRR, No mumru/rub/gallop noted  ECG: SR  Groin/Wound: Soft, NT, No hematoma noted  Gastrointestinal: Soft, NT/ND, + Bowel Sounds  Neuro: = motor and sensory B/L, No focal deficits  Vascular: 1+ Pulses, No Bruits noted  Extremities: + PMS, Trace edema B/L LE,  Skin: Warm/Dry/Normal color, Normal turgor, no rashes                          9.4    11.4  )-----------( 373      ( 14 Jun 2019 09:37 )             27.7   06-14    138  |  104  |  61<H>  ----------------------------<  137<H>  4.5   |  18<L>  |  2.18<H>    Ca    8.5      14 Jun 2019 00:56  Mg     2.0     06-14    TPro  7.6  /  Alb  4.1  /  TBili  0.2  /  DBili  x   /  AST  14  /  ALT  14  /  AlkPhos  85  06-14  PT/INR - ( 14 Jun 2019 00:56 )   PT: 13.5 sec;   INR: 1.18 ratio         PTT - ( 14 Jun 2019 00:56 )  PTT:36.8 sec    Imaging Reviewed:    Post Op TTE: Pending      Assesment/Plan:  73F S/P MitraClip POD #1 for severe Symptomatic MR, Acute on Chronic Combined Heart Failure, Essential HTN  1.) S/P MitraClip: ASA 81 mg po daily, Post op TTE ordered, follow up results when done Continue to Monitor Groins. Ambulate as tolerated.  2.) Acute on Chronic Heart Failure: Continue IV Bumetidine, transition to oral formulation over the next day. Continue with current med regimen  3.) Essential HTN: Patient on Hydralazine which will also help with CHF, BP Controlled  4.) Discharge Plan: Patient is to follow up with Dr. Newman in 1 week post discharge. She should then follow up with the [facility]  in 30 days, with a repeat Transthoracic Echo to be done at that visit.

## 2019-06-14 NOTE — PROGRESS NOTE ADULT - SUBJECTIVE AND OBJECTIVE BOX
Interval Hx; Events Overnight:  SUBJECTIVE: "I feel okay, I walked today  "    LABS:                9.4                  x    | x    | x            11.4  >-----------< 373     ------------------------< x                     27.7                 x    | x    | x                                            Ca x     Mg x     Ph x          PT/INR - ( 2019 00:56 )   PT: 13.5 sec;   INR: 1.18 ratio         PTT - ( 2019 00:56 )  PTT:36.8 sec    VITAL SIGNS    Telemetry:      Daily     Daily Weight in k.7 (2019 00:00)      Vital Signs Last 24 Hrs  T(C): 36.4 (19 @ 11:07), Max: 37.2 (19 @ 02:30)  T(F): 97.5 (19 @ 11:07), Max: 98.9 (19 @ 02:30)  HR: 100 (19 @ 11:07) (80 - 102)  BP: 107/65 (19 @ 11:07) (84/51 - 107/65)  RR: 26 (19 @ 11:07) (12 - 34)  SpO2: 99% (19 @ 11:07) (97% - 100%)             I&O's Detail    2019 07:  -  2019 07:00  --------------------------------------------------------  IN:    Albumin 5%  - 250 mL: 500 mL    insulin regular Infusion: 27 mL    IV PiggyBack: 50 mL    Oral Fluid: 750 mL    Packed Red Blood Cells: 350 mL    phenylephrine   Infusion: 19 mL    sodium chloride 0.9%.: 90 mL  Total IN: 1786 mL    OUT:    Indwelling Catheter - Urethral: 1405 mL    Voided: 800 mL  Total OUT: 2205 mL    Total NET: -419 mL      2019 07:  -  2019 12:25  --------------------------------------------------------  IN:  Total IN: 0 mL    OUT:    Indwelling Catheter - Urethral: 150 mL  Total OUT: 150 mL    Total NET: -150 mL                    GLUCOSE  CAPILLARY BLOOD GLUCOSE  170 (2019 09:30)  150 (2019 18:00)  174 (2019 17:00)  179 (2019 16:00)  207 (2019 15:00)  244 (2019 14:00)      POCT Blood Glucose.: 170 mg/dL (2019 09:23)  POCT Blood Glucose.: 123 mg/dL (2019 06:45)  POCT Blood Glucose.: 60 mg/dL (2019 05:56)  POCT Blood Glucose.: 39 mg/dL (2019 05:52)  POCT Blood Glucose.: 61 mg/dL (2019 05:44)  POCT Blood Glucose.: 141 mg/dL (2019 22:21)  POCT Blood Glucose.: 74 mg/dL (2019 21:38)  POCT Blood Glucose.: 99 mg/dL (2019 21:03)  POCT Blood Glucose.: 175 mg/dL (2019 16:50)  POCT Blood Glucose.: 179 mg/dL (2019 16:00)  POCT Blood Glucose.: 207 mg/dL (2019 15:13)                  PHYSICAL EXAM      General: NAD, well appearing, in no distress  Neurology: A&O x3, non focal, no neuro deficits. Moves all extremities to command.  CV : s1 s2 RRR, no murmurs, gallops, clicks.   Groin wound :  stable, R groin stitch  Lungs: clear to auscultation  Abdomen: soft, nontender, nondistended, positive bowel sounds  :    voiding      Extremities:    no  edema. + pedal pulses      Skin: intact, no lesions        MEDICATIONS  acetaminophen   Tablet .. 650 milliGRAM(s) Oral every 6 hours PRN  aspirin enteric coated 81 milliGRAM(s) Oral daily  atorvastatin 40 milliGRAM(s) Oral at bedtime  buMETAnide Injectable 2 milliGRAM(s) IV Push every 12 hours  dextrose 40% Gel 15 Gram(s) Oral once PRN  dextrose 5%. 1000 milliLiter(s) IV Continuous <Continuous>  dextrose 50% Injectable 12.5 Gram(s) IV Push once  dextrose 50% Injectable 25 Gram(s) IV Push once  dextrose 50% Injectable 25 Gram(s) IV Push once  docusate sodium 100 milliGRAM(s) Oral two times a day  glucagon  Injectable 1 milliGRAM(s) IntraMuscular once PRN  heparin  Injectable 5000 Unit(s) SubCutaneous every 12 hours  hydrALAZINE 20 milliGRAM(s) Oral three times a day  insulin glargine Injectable (LANTUS) 40 Unit(s) SubCutaneous at bedtime  insulin lispro (HumaLOG) corrective regimen sliding scale   SubCutaneous three times a day before meals  insulin lispro (HumaLOG) corrective regimen sliding scale   SubCutaneous at bedtime  insulin lispro (HumaLOG) corrective regimen sliding scale   SubCutaneous three times a day before meals  insulin lispro (HumaLOG) corrective regimen sliding scale   SubCutaneous at bedtime  insulin lispro Injectable (HumaLOG) 16 Unit(s) SubCutaneous three times a day before meals  isosorbide   dinitrate Tablet (ISORDIL) 10 milliGRAM(s) Oral three times a day  levothyroxine 50 MICROGram(s) Oral daily  montelukast 10 milliGRAM(s) Oral daily  oxyCODONE    5 mG/acetaminophen 325 mG 1 Tablet(s) Oral every 4 hours PRN  oxyCODONE    5 mG/acetaminophen 325 mG 2 Tablet(s) Oral every 6 hours PRN  pantoprazole    Tablet 40 milliGRAM(s) Oral before breakfast  senna 2 Tablet(s) Oral at bedtime  sodium chloride 0.9%. 1000 milliLiter(s) IV Continuous <Continuous>  spironolactone 25 milliGRAM(s) Oral daily  ticagrelor 90 milliGRAM(s) Oral two times a day

## 2019-06-14 NOTE — PHYSICAL THERAPY INITIAL EVALUATION ADULT - TRANSFER TRAINING, PT EVAL
Goal: Pt will transfer sit to stand independently at RW  in  2 weeks.
Goal: Pt will transfer sit to stand independently at RW  in  2 weeks.

## 2019-06-14 NOTE — PROGRESS NOTE ADULT - PROBLEM SELECTOR PLAN 1
-post op care per CT surgery   -asa, statin, brillinta, hepSQ  -pulmonary toileting, incentive spirometer   -OOB, mobilize   -glycemic control, ISS and HS lantus  -pain control   -wound care and assessment -post clip TTE   -monitor tele/arrythmia ppx   -discharge planning / PT eval  -Cont diuresis

## 2019-06-14 NOTE — PHYSICAL THERAPY INITIAL EVALUATION ADULT - PHYSICAL ASSIST/NONPHYSICAL ASSIST: SUPINE/SIT, REHAB EVAL
nonverbal cues (demo/gestures)/1 person assist/verbal cues
nonverbal cues (demo/gestures)/verbal cues/1 person assist

## 2019-06-14 NOTE — PHYSICAL THERAPY INITIAL EVALUATION ADULT - STRENGTHENING, PT EVAL
Goal: Pt will increase strength >half a grade  t/o extremities to improve safety of transfers and ambulation in 2 weeks.
Goal: Pt will increase strength >half a grade  t/o extremities to improve safety of transfers and ambulation in 2 weeks.

## 2019-06-14 NOTE — PHYSICAL THERAPY INITIAL EVALUATION ADULT - PHYSICAL ASSIST/NONPHYSICAL ASSIST: SIT/STAND, REHAB EVAL
nonverbal cues (demo/gestures)/1 person assist/verbal cues
1 person assist/verbal cues/nonverbal cues (demo/gestures)

## 2019-06-14 NOTE — PHYSICAL THERAPY INITIAL EVALUATION ADULT - DIAGNOSIS, PT EVAL
decreased mobility/ambulation, decreased strength, decreased balance
Decreased strength, functional mobilty and endurance

## 2019-06-14 NOTE — PHYSICAL THERAPY INITIAL EVALUATION ADULT - IMPAIRED TRANSFERS: SIT/STAND, REHAB EVAL
impaired sensory feedback/decreased flexibility/decreased strength/impaired balance/pain
decreased flexibility/pain/decreased strength/impaired balance/impaired sensory feedback

## 2019-06-14 NOTE — PHYSICAL THERAPY INITIAL EVALUATION ADULT - PHYSICAL ASSIST/NONPHYSICAL ASSIST: SIT/SUPINE, REHAB EVAL
1 person assist/nonverbal cues (demo/gestures)/verbal cues
verbal cues/nonverbal cues (demo/gestures)/1 person assist

## 2019-06-14 NOTE — PHYSICAL THERAPY INITIAL EVALUATION ADULT - PERTINENT HX OF CURRENT PROBLEM, REHAB EVAL
73 F PMH HTN, T2DM, CAD s/p CABG (LIMA to LAD, SVG to OM, SVG to PDA at [facility] 2014), HFrEF (LVEF 25%) severe MR, severe pulm HTN, hypothyroidism, presents from [facility] to [facility] initially on 6/1/19 with sob, increased wob, f/w ADHF and severe MR, cardiorenal syndrome with duncan on ckd, now s/p IV diuresis and transferred over for eval for mitral clip procedure.
73 F PMH HTN, T2DM, CAD s/p CABG (LIMA to LAD, SVG to OM, SVG to PDA at [facility] 2014), HFrEF (LVEF 25%) severe MR, severe pulm HTN, hypothyroidism, [facility] with sob, increased work of breathing, f/w ADHF and severe MR, cardiorenal syndrome with duncan on ckd, now s/p IV diuresis and transferred over for mitral clip on 6/13

## 2019-06-14 NOTE — PHYSICAL THERAPY INITIAL EVALUATION ADULT - GAIT DEVIATIONS NOTED, PT EVAL
decreased stride length/decreased sussy/decreased step length
decreased step length/decreased stride length/decreased sussy

## 2019-06-14 NOTE — PROGRESS NOTE ADULT - ASSESSMENT
72 y/o female with PMHx of HTN, DM, CAD s/p CABG (LIMA to LAD, SVG to OM, SVG to PDA at [facility] 2014), HFrEF (LVEF 25%) severe MR, severe pulm HTN, hypothyroidism, admitted to [facility] for SOB, now transferred to [facility] for Alina Clip Eval    MERVIN  Likely sec to CHF/renal vasocongestion   Renal function fluctuates slightly likely sec to CHF.   Monitor bmp  Diuretics per cardio  IF renal function worsens, would hold off Spirinolactone   Avoid nephrotoxic agents      CKD stage 3  baseline cr 1.5-1.8  likely sec to HTN/DM/chf  Monitor renal function     SOB  likely sec to HF   Monitor daily weight and i/o  Diuretics per cardio  Patient with severe MR, s/p  Mitral Clip on 6/13

## 2019-06-14 NOTE — PHYSICAL THERAPY INITIAL EVALUATION ADULT - PHYSICAL ASSIST/NONPHYSICAL ASSIST: GAIT, REHAB EVAL
verbal cues/1 person assist/nonverbal cues (demo/gestures)
nonverbal cues (demo/gestures)/verbal cues/1 person assist

## 2019-06-14 NOTE — PHYSICAL THERAPY INITIAL EVALUATION ADULT - BED MOBILITY TRAINING, PT EVAL
Goal: Pt will be independent in all bed mobility in 2 weeks.
Goal: Pt will be independent in all bed mobility in 2 weeks.

## 2019-06-14 NOTE — PHYSICAL THERAPY INITIAL EVALUATION ADULT - ACTIVE RANGE OF MOTION EXAMINATION, REHAB EVAL
bilateral upper extremity Active ROM was WFL (within functional limits)/bilateral  lower extremity Active ROM was WFL (within functional limits)
bilateral upper extremity Active ROM was WFL (within functional limits)/bilateral  lower extremity Active ROM was WFL (within functional limits)

## 2019-06-14 NOTE — PHYSICAL THERAPY INITIAL EVALUATION ADULT - IMPAIRMENTS CONTRIBUTING TO GAIT DEVIATIONS, PT EVAL
pain/impaired balance/decreased flexibility/impaired sensory feedback/decreased strength
decreased flexibility/pain/impaired sensory feedback/decreased strength/impaired balance

## 2019-06-14 NOTE — PHYSICAL THERAPY INITIAL EVALUATION ADULT - PLANNED THERAPY INTERVENTIONS, PT EVAL
bed mobility training/Stairs Goal: Pt will go up/down 5 steps with + handrail  and assist x 1 in 2 weeks./gait training/strengthening/transfer training
strengthening/gait training/Stairs Goal: Pt will go up/down 5 steps with + handrail  and assist x 1 in 2 weeks./bed mobility training/transfer training

## 2019-06-14 NOTE — PROGRESS NOTE ADULT - SUBJECTIVE AND OBJECTIVE BOX
SELVIN CLAIRE  MRN-[id number]  Patient is a 73y old  Female who presents with a chief complaint of ADHF, eval for mitral clip (2019 08:16)    HPI:  73 F PMH HTN, T2DM, CAD s/p CABG (LIMA to LAD, SVG to OM, SVG to PDA at [facility] ), HFrEF (LVEF 25%) severe MR, severe pulm HTN, hypothyroidism, [facility] on [facility] with sob, increased wob. Pt had a prior admission in 2019 for ADHF and cp s/p LHC showing TVD medically managed. On this current admission, pt was f/w CXR showing pulm edema, probnp >4k, and dyspneic, thus admitted to CCU and started on milrinone gtt and bumex gtt for ADHF. Continued on bipap. Pt had repeat TTE which showed severe MR.  Pt developed acute on chronic kidney injury presumed 2/2 IV/gtt diuresis; milrinone was d/c 19, and bumex gtt d/c 19, with renal following for cardiorenal syn/hemodynamically mediated duncan.  Pt currently on po bumex bid, aldactone qd, for maintenance diuresis and PRN bumex IV pushes. Pt was transferred to [facility] for eval for mitral clip procedure.  Currently, pt endorses mild dyspnea, +frequent urination, +weakness. Denies cp, f/c, n/v/d, dysuria, cough.     Vs: 98.2, 85, 107/68, 18, 99%. (2019 21:44)      Surgery/Hospital course:    Today:    REVIEW OF SYSTEMS:  Gen: No fever  EYES/ENT: No visual changes;  No vertigo or throat pain   NECK: No pain   RES:  No shortness of breath or Cough  Chest: + incisional pain  CARD: No chest pain   GI: No abdominal pain  : No dysuria  NEURO: No weakness  SKIN: No itching, rashes     Physical Exam:  Vital Signs Last 24 Hrs  T(C): 36.1 (2019 07:00), Max: 37.2 (2019 02:30)  T(F): 97 (2019 07:00), Max: 98.9 (2019 02:30)  HR: 90 (2019 08:00) (80 - 102)  BP: 93/50 (2019 23:30) (84/51 - 93/50)  BP(mean): 66 (2019 23:30) (63 - 66)  RR: 20 (2019 08:00) (12 - 28)  SpO2: 99% (2019 08:00) (97% - 100%)  Gen:  Awake, alert   CNS: non focal 	  Neck: no JVD  RES : clear , no wheezing    Chest:   + chest tubes                     CVS: Regular  rhythm. Normal S1/S2  Abd: Soft, non-distended. Bowel sounds present.  Skin: No rash.  Ext:  no edema, A Line  PSY:  ============================I/O===========================   I&O's Detail    2019 07:  -  2019 07:00  --------------------------------------------------------  IN:    Albumin 5%  - 250 mL: 500 mL    insulin regular Infusion: 27 mL    IV PiggyBack: 50 mL    Oral Fluid: 750 mL    Packed Red Blood Cells: 350 mL    phenylephrine   Infusion: 19 mL    sodium chloride 0.9%.: 90 mL  Total IN: 1786 mL    OUT:    Indwelling Catheter - Urethral: 1405 mL    Voided: 800 mL  Total OUT: 2205 mL    Total NET: -419 mL      2019 07:  -  2019 08:54  --------------------------------------------------------  IN:  Total IN: 0 mL    OUT:    Indwelling Catheter - Urethral: 100 mL  Total OUT: 100 mL    Total NET: -100 mL        ============================ LABS =========================                        8.8    10.9  )-----------( 349      ( 2019 05:47 )             25.3     06-14    138  |  104  |  61<H>  ----------------------------<  137<H>  4.5   |  18<L>  |  2.18<H>    Ca    8.5      2019 00:56  Mg     2.0         TPro  7.6  /  Alb  4.1  /  TBili  0.2  /  DBili  x   /  AST  14  /  ALT  14  /  AlkPhos  85      LIVER FUNCTIONS - ( 2019 00:56 )  Alb: 4.1 g/dL / Pro: 7.6 g/dL / ALK PHOS: 85 U/L / ALT: 14 U/L / AST: 14 U/L / GGT: x           PT/INR - ( 2019 00:56 )   PT: 13.5 sec;   INR: 1.18 ratio         PTT - ( 2019 00:56 )  PTT:36.8 sec  ABG - ( 2019 00:31 )  pH, Arterial: 7.40  pH, Blood: x     /  pCO2: 32    /  pO2: 165   / HCO3: 20    / Base Excess: -4.1  /  SaO2: 99                Urinalysis Basic - ( 2019 12:34 )    Color: Light Yellow / Appearance: Clear / S.014 / pH: x  Gluc: x / Ketone: Negative  / Bili: Negative / Urobili: Negative   Blood: x / Protein: Trace / Nitrite: Positive   Leuk Esterase: Large / RBC: 1 /hpf / WBC 40 /HPF   Sq Epi: x / Non Sq Epi: 1 /hpf / Bacteria: Many      ======================Micro/Rad/Cardio=================  Culture: Reviewed   CXR: Reviewed  Echo:Reviewed  ======================================================  PAST MEDICAL & SURGICAL HISTORY:  GERD (gastroesophageal reflux disease)  CAD (coronary artery disease): s/p CABG  Hypothyroidism  Hyperlipidemia  Diabetes mellitus, type 2  S/P CABG (coronary artery bypass graft)    ====================ASSESMENT ==============  2019 Mitral Clipp  Hypotension  anemia blood loss  GERD (gastroesophageal reflux disease)  CAD (coronary artery disease): s/p CABG  Hypothyroidism  Hyperlipidemia  Diabetes mellitus, type 2  S/P CABG (coronary artery bypass graft)      Plan:  ====================== NEUROLOGY=====================  acetaminophen   Tablet .. 650 milliGRAM(s) Oral every 6 hours PRN Temp greater or equal to 38C (100.4F)  acetaminophen  IVPB .. 750 milliGRAM(s) IV Intermittent once  oxyCODONE    5 mG/acetaminophen 325 mG 1 Tablet(s) Oral every 4 hours PRN Mild Pain (1 - 3)  oxyCODONE    5 mG/acetaminophen 325 mG 2 Tablet(s) Oral every 6 hours PRN Moderate Pain (4 - 6)    ==================== RESPIRATORY======================  montelukast 10 milliGRAM(s) Oral daily    ====================CARDIOVASCULAR==================  buMETAnide Injectable 2 milliGRAM(s) IV Push every 12 hours  hydrALAZINE 20 milliGRAM(s) Oral three times a day  isosorbide   dinitrate Tablet (ISORDIL) 10 milliGRAM(s) Oral three times a day  d/c phenylephrine    Infusion 0.3 MICROgram(s)/kG/Min (5.04 mL/Hr) IV Continuous <Continuous>  spironolactone 25 milliGRAM(s) Oral daily    ===================HEMATOLOGIC/ONC ===================  aspirin enteric coated 81 milliGRAM(s) Oral daily  heparin  Injectable 5000 Unit(s) SubCutaneous every 12 hours  ticagrelor 90 milliGRAM(s) Oral two times a day    ===================== RENAL =========================  d/c Oscar   ==================== GASTROINTESTINAL===================  docusate sodium 100 milliGRAM(s) Oral two times a day  pantoprazole  Injectable 40 milliGRAM(s) IV Push daily  senna 2 Tablet(s) Oral at bedtime    =======================    ENDOCRINE  =====================  atorvastatin 40 milliGRAM(s) Oral at bedtime  insulin glargine Injectable (LANTUS) 50 Unit(s) SubCutaneous at bedtime  insulin lispro (HumaLOG) corrective regimen sliding scale   SubCutaneous three times a day before meals  insulin lispro (HumaLOG) corrective regimen sliding scale   SubCutaneous at bedtime  insulin lispro (HumaLOG) corrective regimen sliding scale   SubCutaneous three times a day before meals  insulin lispro (HumaLOG) corrective regimen sliding scale   SubCutaneous at bedtime  insulin lispro Injectable (HumaLOG) 16 Unit(s) SubCutaneous three times a day before meals  insulin regular Infusion 2 Unit(s)/Hr (2 mL/Hr) IV Continuous <Continuous>  levothyroxine Injectable 25 MICROGram(s) IV Push at bedtime change to 50 thaddeus daily    ========================INFECTIOUS DISEASE================    discussed with CTU team; transfer to floor SELVIN CLAIRE  MRN-[id number]  Patient is a 73y old  Female who presents with a chief complaint of ADHF, eval for mitral clip (2019 08:16)    HPI:  73 F PMH HTN, T2DM, CAD s/p CABG (LIMA to LAD, SVG to OM, SVG to PDA at [facility] ), HFrEF (LVEF 25%) severe MR, severe pulm HTN, hypothyroidism, [facility] on 19 with sob, increased wob. Pt had a prior admission in 2019 for ADHF and cp s/p LHC showing TVD medically managed. On this current admission, pt was f/w CXR showing pulm edema, probnp >4k, and dyspneic, thus admitted to CCU and started on milrinone gtt and bumex gtt for ADHF. Continued on bipap. Pt had repeat TTE which showed severe MR.  Pt developed acute on chronic kidney injury presumed 2/2 IV/gtt diuresis; milrinone was d/c 19, and bumex gtt d/c 19, with renal following for cardiorenal syn/hemodynamically mediated duncan.  Pt currently on po bumex bid, aldactone qd, for maintenance diuresis and PRN bumex IV pushes. Pt was transferred to [facility] for eval for mitral clip procedure.  Currently, pt endorses mild dyspnea, +frequent urination, +weakness. Denies cp, f/c, n/v/d, dysuria, cough.     Vs: 98.2, 85, 107/68, 18, 99%. (2019 21:44)      Surgery/Hospital course:  2019 s/p Mitral Clip for severe MR  c/o incisional pain, no sob  recieved last  night 1 prbc for Hct=21; on obvious bleeding.    REVIEW OF SYSTEMS:  Gen: No fever  NECK: No pain   RES:  No shortness of breath   Chest: + incisional pain  CARD: No chest pain   GI: No abdominal pain  : No dysuria  NEURO: No weakness  SKIN: No itching, rashes     Physical Exam:  Vital Signs Last 24 Hrs  T(C): 36.1 (2019 07:00), Max: 37.2 (2019 02:30)  T(F): 97 (2019 07:00), Max: 98.9 (2019 02:30)  HR: 90 (2019 08:00) (80 - 102)  BP: 93/50 (2019 23:30) (84/51 - 93/50)  BP(mean): 66 (2019 23:30) (63 - 66)  RR: 20 (2019 08:00) (12 - 28)  SpO2: 99% (2019 08:00) (97% - 100%)  Gen:  Awake, alert   CNS: non focal 	  Neck: no JVD  RES : clear , no wheezing                    CVS: Regular  rhythm. Normal S1/S2  Abd: Soft, non-distended. Bowel sounds present.  Skin: No rash.  Ext:  no edema, A Line    ============================I/O===========================   I&O's Detail    2019 07:  -  2019 07:00  --------------------------------------------------------  IN:    Albumin 5%  - 250 mL: 500 mL    insulin regular Infusion: 27 mL    IV PiggyBack: 50 mL    Oral Fluid: 750 mL    Packed Red Blood Cells: 350 mL    phenylephrine   Infusion: 19 mL    sodium chloride 0.9%.: 90 mL  Total IN: 1786 mL    OUT:    Indwelling Catheter - Urethral: 1405 mL    Voided: 800 mL  Total OUT: 2205 mL    Total NET: -419 mL      2019 07:01  -  2019 08:54  --------------------------------------------------------  IN:  Total IN: 0 mL    OUT:    Indwelling Catheter - Urethral: 100 mL  Total OUT: 100 mL    Total NET: -100 mL        ============================ LABS =========================                        8.8    10.9  )-----------( 349      ( 2019 05:47 )             25.3       138  |  104  |  61<H>  ----------------------------<  137<H>  4.5   |  18<L>  |  2.18<H>    Ca    8.5      2019 00:56  Mg     2.0     06-    TPro  7.6  /  Alb  4.1  /  TBili  0.2  /  DBili  x   /  AST  14  /  ALT  14  /  AlkPhos  85  06-14    LIVER FUNCTIONS - ( 2019 00:56 )  Alb: 4.1 g/dL / Pro: 7.6 g/dL / ALK PHOS: 85 U/L / ALT: 14 U/L / AST: 14 U/L / GGT: x           PT/INR - ( 2019 00:56 )   PT: 13.5 sec;   INR: 1.18 ratio  PTT - ( 2019 00:56 )  PTT:36.8 sec  ABG - ( 2019 00:31 ) pH, Arterial: 7.40  pH, Blood: x     /  pCO2: 32    /  pO2: 165   / HCO3: 20    / Base Excess: -4.1  /  SaO2: 99        Urinalysis Basic - ( 2019 12:34 )    Color: Light Yellow / Appearance: Clear / S.014 / pH: x  Gluc: x / Ketone: Negative  / Bili: Negative / Urobili: Negative   Blood: x / Protein: Trace / Nitrite: Positive   Leuk Esterase: Large / RBC: 1 /hpf / WBC 40 /HPF   Sq Epi: x / Non Sq Epi: 1 /hpf / Bacteria: Many      ======================Micro/Rad/Cardio=================  Culture:urine culture pending   CXR: Reviewed  Echo:Reviewed  ======================================================  PAST MEDICAL & SURGICAL HISTORY:  GERD (gastroesophageal reflux disease)  CAD (coronary artery disease): s/p CABG  Hypothyroidism  Hyperlipidemia  Diabetes mellitus, type 2  S/P CABG (coronary artery bypass graft)    ====================ASSESMENT ==============  2019 Mitral Clipp  Hypotension  anemia blood loss  chronic systolic heart failure  CKD  GERD (gastroesophageal reflux disease)  CAD (coronary artery disease): s/p CABG  Hypothyroidism  Hyperlipidemia  Diabetes mellitus, type 2      Plan:  ====================== NEUROLOGY=====================  acetaminophen   Tablet .. 650 milliGRAM(s) Oral every 6 hours PRN Temp greater or equal to 38C (100.4F)  acetaminophen  IVPB .. 750 milliGRAM(s) IV Intermittent once  oxyCODONE    5 mG/acetaminophen 325 mG 1 Tablet(s) Oral every 4 hours PRN Mild Pain (1 - 3)  oxyCODONE    5 mG/acetaminophen 325 mG 2 Tablet(s) Oral every 6 hours PRN Moderate Pain (4 - 6)    ==================== RESPIRATORY======================  montelukast 10 milliGRAM(s) Oral daily    ====================CARDIOVASCULAR==================  buMETAnide Injectable 2 milliGRAM(s) IV Push every 12 hours  hydrALAZINE 20 milliGRAM(s) Oral three times a day  isosorbide   dinitrate Tablet (ISORDIL) 10 milliGRAM(s) Oral three times a day  d/c phenylephrine    Infusion 0.3 MICROgram(s)/kG/Min (5.04 mL/Hr) IV Continuous <Continuous>  spironolactone 25 milliGRAM(s) Oral daily    ===================HEMATOLOGIC/ONC ===================  aspirin enteric coated 81 milliGRAM(s) Oral daily  heparin  Injectable 5000 Unit(s) SubCutaneous every 12 hours  ticagrelor 90 milliGRAM(s) Oral two times a day    ===================== RENAL =========================  d/c Oscar   ==================== GASTROINTESTINAL===================  docusate sodium 100 milliGRAM(s) Oral two times a day  pantoprazole  Injectable 40 milliGRAM(s) IV Push daily  senna 2 Tablet(s) Oral at bedtime    =======================    ENDOCRINE  =====================  atorvastatin 40 milliGRAM(s) Oral at bedtime  insulin glargine Injectable (LANTUS) 50 Unit(s) SubCutaneous at bedtime  insulin lispro (HumaLOG) corrective regimen sliding scale   SubCutaneous three times a day before meals  insulin lispro (HumaLOG) corrective regimen sliding scale   SubCutaneous at bedtime  insulin lispro (HumaLOG) corrective regimen sliding scale   SubCutaneous three times a day before meals  insulin lispro (HumaLOG) corrective regimen sliding scale   SubCutaneous at bedtime  insulin lispro Injectable (HumaLOG) 16 Unit(s) SubCutaneous three times a day before meals  insulin regular Infusion 2 Unit(s)/Hr (2 mL/Hr) IV Continuous <Continuous>  levothyroxine Injectable 25 MICROGram(s) IV Push at bedtime change to 50 thaddeus daily    ========================INFECTIOUS DISEASE================    discussed with CTU team; transfer to floor No

## 2019-06-14 NOTE — PROGRESS NOTE ADULT - ASSESSMENT
73 F PMH HTN, T2DM, CAD s/p CABG (LIMA to LAD, SVG to OM, SVG to PDA at [facility] 2014), HFrEF (LVEF 25%) severe MR, severe pulm HTN, hypothyroidism, [facility] initially on 6/1/19 with sob, increased work of breathing, f/w ADHF and severe MR, cardiorenal syndrome with duncan on ckd, now s/p IV diuresis and transferred over for eval for mitral clip procedure in the setting of recurrent readmissions for ADHF exacerbation.      6/13 s/p mitral clip, post op requiring vasopressors for hypotension  6/14 VSS, transferred to [facility]. Tolerating current medical regiment. Cont diuretics. 73 F PMH HTN, T2DM, CAD s/p CABG (LIMA to LAD, SVG to OM, SVG to PDA at [facility] 2014), HFrEF (LVEF 25%) severe MR, severe pulm HTN, hypothyroidism, [facility]1/19 with sob, increased work of breathing, f/w ADHF and severe MR, cardiorenal syndrome with duncan on ckd, now s/p IV diuresis and transferred over for eval for mitral clip procedure in the setting of recurrent readmissions for ADHF exacerbation.      6/13 s/p mitral clip, post op requiring vasopressors for hypotension  6/14 VSS, s/p 1 PRBC, transferred to [facility]. Tolerating current medical regiment. Cont diuretics.  Follow up ECHO.

## 2019-06-15 DIAGNOSIS — E11.9 TYPE 2 DIABETES MELLITUS WITHOUT COMPLICATIONS: ICD-10-CM

## 2019-06-15 DIAGNOSIS — N39.0 URINARY TRACT INFECTION, SITE NOT SPECIFIED: ICD-10-CM

## 2019-06-15 DIAGNOSIS — F41.9 ANXIETY DISORDER, UNSPECIFIED: ICD-10-CM

## 2019-06-15 LAB
-  AMIKACIN: SIGNIFICANT CHANGE UP
-  AMPICILLIN/SULBACTAM: SIGNIFICANT CHANGE UP
-  AMPICILLIN: SIGNIFICANT CHANGE UP
-  AZTREONAM: SIGNIFICANT CHANGE UP
-  CEFAZOLIN: SIGNIFICANT CHANGE UP
-  CEFEPIME: SIGNIFICANT CHANGE UP
-  CEFOXITIN: SIGNIFICANT CHANGE UP
-  CEFTRIAXONE: SIGNIFICANT CHANGE UP
-  CIPROFLOXACIN: SIGNIFICANT CHANGE UP
-  ERTAPENEM: SIGNIFICANT CHANGE UP
-  GENTAMICIN: SIGNIFICANT CHANGE UP
-  IMIPENEM: SIGNIFICANT CHANGE UP
-  LEVOFLOXACIN: SIGNIFICANT CHANGE UP
-  MEROPENEM: SIGNIFICANT CHANGE UP
-  NITROFURANTOIN: SIGNIFICANT CHANGE UP
-  PIPERACILLIN/TAZOBACTAM: SIGNIFICANT CHANGE UP
-  TIGECYCLINE: SIGNIFICANT CHANGE UP
-  TOBRAMYCIN: SIGNIFICANT CHANGE UP
-  TRIMETHOPRIM/SULFAMETHOXAZOLE: SIGNIFICANT CHANGE UP
ALBUMIN SERPL ELPH-MCNC: 4 G/DL — SIGNIFICANT CHANGE UP (ref 3.3–5)
ALP SERPL-CCNC: 112 U/L — SIGNIFICANT CHANGE UP (ref 40–120)
ALT FLD-CCNC: 15 U/L — SIGNIFICANT CHANGE UP (ref 10–45)
ANION GAP SERPL CALC-SCNC: 14 MMOL/L — SIGNIFICANT CHANGE UP (ref 5–17)
AST SERPL-CCNC: 17 U/L — SIGNIFICANT CHANGE UP (ref 10–40)
BILIRUB SERPL-MCNC: 0.2 MG/DL — SIGNIFICANT CHANGE UP (ref 0.2–1.2)
BUN SERPL-MCNC: 49 MG/DL — HIGH (ref 7–23)
CALCIUM SERPL-MCNC: 8.3 MG/DL — LOW (ref 8.4–10.5)
CHLORIDE SERPL-SCNC: 103 MMOL/L — SIGNIFICANT CHANGE UP (ref 96–108)
CO2 SERPL-SCNC: 19 MMOL/L — LOW (ref 22–31)
CREAT SERPL-MCNC: 1.83 MG/DL — HIGH (ref 0.5–1.3)
CULTURE RESULTS: SIGNIFICANT CHANGE UP
GLUCOSE BLDC GLUCOMTR-MCNC: 107 MG/DL — HIGH (ref 70–99)
GLUCOSE BLDC GLUCOMTR-MCNC: 125 MG/DL — HIGH (ref 70–99)
GLUCOSE BLDC GLUCOMTR-MCNC: 182 MG/DL — HIGH (ref 70–99)
GLUCOSE BLDC GLUCOMTR-MCNC: 299 MG/DL — HIGH (ref 70–99)
GLUCOSE SERPL-MCNC: 346 MG/DL — HIGH (ref 70–99)
HCT VFR BLD CALC: 27.7 % — LOW (ref 34.5–45)
HGB BLD-MCNC: 8.8 G/DL — LOW (ref 11.5–15.5)
MCHC RBC-ENTMCNC: 27.1 PG — SIGNIFICANT CHANGE UP (ref 27–34)
MCHC RBC-ENTMCNC: 31.6 GM/DL — LOW (ref 32–36)
MCV RBC AUTO: 85.8 FL — SIGNIFICANT CHANGE UP (ref 80–100)
METHOD TYPE: SIGNIFICANT CHANGE UP
ORGANISM # SPEC MICROSCOPIC CNT: SIGNIFICANT CHANGE UP
ORGANISM # SPEC MICROSCOPIC CNT: SIGNIFICANT CHANGE UP
PLATELET # BLD AUTO: 352 K/UL — SIGNIFICANT CHANGE UP (ref 150–400)
POTASSIUM SERPL-MCNC: 4.1 MMOL/L — SIGNIFICANT CHANGE UP (ref 3.5–5.3)
POTASSIUM SERPL-SCNC: 4.1 MMOL/L — SIGNIFICANT CHANGE UP (ref 3.5–5.3)
PROT SERPL-MCNC: 7.7 G/DL — SIGNIFICANT CHANGE UP (ref 6–8.3)
RBC # BLD: 3.23 M/UL — LOW (ref 3.8–5.2)
RBC # FLD: 15.6 % — HIGH (ref 10.3–14.5)
SODIUM SERPL-SCNC: 136 MMOL/L — SIGNIFICANT CHANGE UP (ref 135–145)
SPECIMEN SOURCE: SIGNIFICANT CHANGE UP
WBC # BLD: 9.4 K/UL — SIGNIFICANT CHANGE UP (ref 3.8–10.5)
WBC # FLD AUTO: 9.4 K/UL — SIGNIFICANT CHANGE UP (ref 3.8–10.5)

## 2019-06-15 PROCEDURE — 93306 TTE W/DOPPLER COMPLETE: CPT | Mod: 26

## 2019-06-15 PROCEDURE — 71045 X-RAY EXAM CHEST 1 VIEW: CPT | Mod: 26

## 2019-06-15 PROCEDURE — 99231 SBSQ HOSP IP/OBS SF/LOW 25: CPT

## 2019-06-15 RX ORDER — ALPRAZOLAM 0.25 MG
0.25 TABLET ORAL EVERY 8 HOURS
Refills: 0 | Status: DISCONTINUED | OUTPATIENT
Start: 2019-06-15 | End: 2019-06-16

## 2019-06-15 RX ADMIN — Medication 16 UNIT(S): at 07:55

## 2019-06-15 RX ADMIN — Medication 100 MILLIGRAM(S): at 06:08

## 2019-06-15 RX ADMIN — INSULIN GLARGINE 40 UNIT(S): 100 INJECTION, SOLUTION SUBCUTANEOUS at 22:06

## 2019-06-15 RX ADMIN — Medication 2: at 17:04

## 2019-06-15 RX ADMIN — Medication 1 TABLET(S): at 06:08

## 2019-06-15 RX ADMIN — MONTELUKAST 10 MILLIGRAM(S): 4 TABLET, CHEWABLE ORAL at 21:18

## 2019-06-15 RX ADMIN — Medication 50 MICROGRAM(S): at 06:08

## 2019-06-15 RX ADMIN — Medication 650 MILLIGRAM(S): at 21:21

## 2019-06-15 RX ADMIN — Medication 650 MILLIGRAM(S): at 21:55

## 2019-06-15 RX ADMIN — ISOSORBIDE DINITRATE 10 MILLIGRAM(S): 5 TABLET ORAL at 21:19

## 2019-06-15 RX ADMIN — OXYCODONE AND ACETAMINOPHEN 1 TABLET(S): 5; 325 TABLET ORAL at 07:23

## 2019-06-15 RX ADMIN — HEPARIN SODIUM 5000 UNIT(S): 5000 INJECTION INTRAVENOUS; SUBCUTANEOUS at 17:06

## 2019-06-15 RX ADMIN — Medication 81 MILLIGRAM(S): at 13:26

## 2019-06-15 RX ADMIN — SENNA PLUS 2 TABLET(S): 8.6 TABLET ORAL at 21:19

## 2019-06-15 RX ADMIN — OXYCODONE AND ACETAMINOPHEN 1 TABLET(S): 5; 325 TABLET ORAL at 07:53

## 2019-06-15 RX ADMIN — PANTOPRAZOLE SODIUM 40 MILLIGRAM(S): 20 TABLET, DELAYED RELEASE ORAL at 06:08

## 2019-06-15 RX ADMIN — SPIRONOLACTONE 25 MILLIGRAM(S): 25 TABLET, FILM COATED ORAL at 06:08

## 2019-06-15 RX ADMIN — TICAGRELOR 90 MILLIGRAM(S): 90 TABLET ORAL at 17:03

## 2019-06-15 RX ADMIN — Medication 16 UNIT(S): at 17:04

## 2019-06-15 RX ADMIN — Medication 16 UNIT(S): at 12:05

## 2019-06-15 RX ADMIN — Medication 20 MILLIGRAM(S): at 06:08

## 2019-06-15 RX ADMIN — BUMETANIDE 2 MILLIGRAM(S): 0.25 INJECTION INTRAMUSCULAR; INTRAVENOUS at 17:04

## 2019-06-15 RX ADMIN — ISOSORBIDE DINITRATE 10 MILLIGRAM(S): 5 TABLET ORAL at 06:08

## 2019-06-15 RX ADMIN — ISOSORBIDE DINITRATE 10 MILLIGRAM(S): 5 TABLET ORAL at 13:26

## 2019-06-15 RX ADMIN — Medication 6: at 07:54

## 2019-06-15 RX ADMIN — HEPARIN SODIUM 5000 UNIT(S): 5000 INJECTION INTRAVENOUS; SUBCUTANEOUS at 06:09

## 2019-06-15 RX ADMIN — Medication 20 MILLIGRAM(S): at 21:18

## 2019-06-15 RX ADMIN — Medication 20 MILLIGRAM(S): at 13:26

## 2019-06-15 RX ADMIN — ATORVASTATIN CALCIUM 40 MILLIGRAM(S): 80 TABLET, FILM COATED ORAL at 21:18

## 2019-06-15 RX ADMIN — Medication 1 TABLET(S): at 17:05

## 2019-06-15 RX ADMIN — TICAGRELOR 90 MILLIGRAM(S): 90 TABLET ORAL at 06:08

## 2019-06-15 RX ADMIN — Medication 100 MILLIGRAM(S): at 17:03

## 2019-06-15 RX ADMIN — Medication 0.25 MILLIGRAM(S): at 09:15

## 2019-06-15 RX ADMIN — BUMETANIDE 2 MILLIGRAM(S): 0.25 INJECTION INTRAMUSCULAR; INTRAVENOUS at 06:03

## 2019-06-15 NOTE — PROGRESS NOTE ADULT - SUBJECTIVE AND OBJECTIVE BOX
VITAL SIGNS    Telemetry:    Vital Signs Last 24 Hrs  T(C): 36.7 (06-15-19 @ 05:00), Max: 36.8 (19 @ 19:20)  T(F): 98.1 (06-15-19 @ 05:00), Max: 98.3 (19 @ 19:20)  HR: 89 (06-15-19 @ 05:00) (80 - 107)  BP: 103/64 (06-15-19 @ 05:00) (103/64 - 137/72)  RR: 18 (06-15-19 @ 05:00) (18 - 18)  SpO2: 100% (06-15-19 @ 05:00) (96% - 100%)             @ 07:01  -  06-15 @ 07:00  --------------------------------------------------------  IN: 620 mL / OUT: 1150 mL / NET: -530 mL    06-15 @ 07:01  -  06-15 @ 11:35  --------------------------------------------------------  IN: 240 mL / OUT: 0 mL / NET: 240 mL       Daily     Daily Weight in k.6 (15 Yovanny 2019 08:45)  Admit Wt: Drug Dosing Weight  Height (cm): 149.86 (2019 08:35)  Weight (kg): 44.8 (2019 08:35)  BMI (kg/m2): 19.9 (2019 08:35)  BSA (m2): 1.37 (2019 08:35)    Bilirubin Total, Serum: 0.2 mg/dL (06-15 @ 05:50)    CAPILLARY BLOOD GLUCOSE      POCT Blood Glucose.: 299 mg/dL (15 Yovanny 2019 07:48)  POCT Blood Glucose.: 241 mg/dL (2019 21:41)  POCT Blood Glucose.: 165 mg/dL (2019 19:16)  POCT Blood Glucose.: 217 mg/dL (2019 16:45)  POCT Blood Glucose.: 177 mg/dL (2019 13:16)          acetaminophen   Tablet .. 650 milliGRAM(s) Oral every 6 hours PRN  ALPRAZolam 0.25 milliGRAM(s) Oral every 8 hours PRN  aspirin enteric coated 81 milliGRAM(s) Oral daily  atorvastatin 40 milliGRAM(s) Oral at bedtime  buMETAnide Injectable 2 milliGRAM(s) IV Push every 12 hours  dextrose 40% Gel 15 Gram(s) Oral once PRN  dextrose 5%. 1000 milliLiter(s) IV Continuous <Continuous>  dextrose 50% Injectable 12.5 Gram(s) IV Push once  dextrose 50% Injectable 25 Gram(s) IV Push once  dextrose 50% Injectable 25 Gram(s) IV Push once  docusate sodium 100 milliGRAM(s) Oral two times a day  glucagon  Injectable 1 milliGRAM(s) IntraMuscular once PRN  heparin  Injectable 5000 Unit(s) SubCutaneous every 12 hours  hydrALAZINE 20 milliGRAM(s) Oral three times a day  insulin glargine Injectable (LANTUS) 40 Unit(s) SubCutaneous at bedtime  insulin lispro (HumaLOG) corrective regimen sliding scale   SubCutaneous three times a day before meals  insulin lispro (HumaLOG) corrective regimen sliding scale   SubCutaneous at bedtime  insulin lispro Injectable (HumaLOG) 16 Unit(s) SubCutaneous three times a day before meals  isosorbide   dinitrate Tablet (ISORDIL) 10 milliGRAM(s) Oral three times a day  levothyroxine 50 MICROGram(s) Oral daily  montelukast 10 milliGRAM(s) Oral daily  oxyCODONE    5 mG/acetaminophen 325 mG 1 Tablet(s) Oral every 4 hours PRN  oxyCODONE    5 mG/acetaminophen 325 mG 2 Tablet(s) Oral every 6 hours PRN  pantoprazole    Tablet 40 milliGRAM(s) Oral before breakfast  senna 2 Tablet(s) Oral at bedtime  sodium chloride 0.9%. 1000 milliLiter(s) IV Continuous <Continuous>  spironolactone 25 milliGRAM(s) Oral daily  ticagrelor 90 milliGRAM(s) Oral two times a day  trimethoprim   80 mG/sulfamethoxazole 400 mG 1 Tablet(s) Oral every 12 hours      PHYSICAL EXAM    Subjective: "Hi.   Neurology: alert and oriented x 3, nonfocal, no gross deficits  CV : tele:  RSR  Sternal Wound :  CDI with dressing , Stable  Lungs: clear. RR easy, unlabored   Abdomen: soft, nontender, nondistended, positive bowel sounds, bowel movement   Neg N/V/D   :  pt voiding without difficulty   Extremities:   CUADRA; edema, neg calf tenderness.   PPP bilaterally      PW:  Chest tubes: VITAL SIGNS    Telemetry:  rsr    Vital Signs Last 24 Hrs  T(C): 36.7 (06-15-19 @ 05:00), Max: 36.8 (19 @ 19:20)  T(F): 98.1 (06-15-19 @ 05:00), Max: 98.3 (19 @ 19:20)  HR: 89 (06-15-19 @ 05:00) (80 - 107)  BP: 103/64 (06-15-19 @ 05:00) (103/64 - 137/72)  RR: 18 (06-15-19 @ 05:00) (18 - 18)  SpO2: 100% (06-15-19 @ 05:00) (96% - 100%)             @ 07:01  -  06-15 @ 07:00  --------------------------------------------------------  IN: 620 mL / OUT: 1150 mL / NET: -530 mL    06-15 @ 07:01  -  06-15 @ 11:35  --------------------------------------------------------  IN: 240 mL / OUT: 0 mL / NET: 240 mL       Daily     Daily Weight in k.6 (15 Yovanny 2019 08:45)  Admit Wt: Drug Dosing Weight  Height (cm): 149.86 (2019 08:35)  Weight (kg): 44.8 (2019 08:35)  BMI (kg/m2): 19.9 (2019 08:35)  BSA (m2): 1.37 (2019 08:35)    Bilirubin Total, Serum: 0.2 mg/dL (06-15 @ 05:50)    CAPILLARY BLOOD GLUCOSE      POCT Blood Glucose.: 299 mg/dL (15 Yovanny 2019 07:48)  POCT Blood Glucose.: 241 mg/dL (2019 21:41)  POCT Blood Glucose.: 165 mg/dL (2019 19:16)  POCT Blood Glucose.: 217 mg/dL (2019 16:45)  POCT Blood Glucose.: 177 mg/dL (2019 13:16)          acetaminophen   Tablet .. 650 milliGRAM(s) Oral every 6 hours PRN  ALPRAZolam 0.25 milliGRAM(s) Oral every 8 hours PRN  aspirin enteric coated 81 milliGRAM(s) Oral daily  atorvastatin 40 milliGRAM(s) Oral at bedtime  buMETAnide Injectable 2 milliGRAM(s) IV Push every 12 hours  dextrose 40% Gel 15 Gram(s) Oral once PRN  dextrose 5%. 1000 milliLiter(s) IV Continuous <Continuous>  dextrose 50% Injectable 12.5 Gram(s) IV Push once  dextrose 50% Injectable 25 Gram(s) IV Push once  dextrose 50% Injectable 25 Gram(s) IV Push once  docusate sodium 100 milliGRAM(s) Oral two times a day  glucagon  Injectable 1 milliGRAM(s) IntraMuscular once PRN  heparin  Injectable 5000 Unit(s) SubCutaneous every 12 hours  hydrALAZINE 20 milliGRAM(s) Oral three times a day  insulin glargine Injectable (LANTUS) 40 Unit(s) SubCutaneous at bedtime  insulin lispro (HumaLOG) corrective regimen sliding scale   SubCutaneous three times a day before meals  insulin lispro (HumaLOG) corrective regimen sliding scale   SubCutaneous at bedtime  insulin lispro Injectable (HumaLOG) 16 Unit(s) SubCutaneous three times a day before meals  isosorbide   dinitrate Tablet (ISORDIL) 10 milliGRAM(s) Oral three times a day  levothyroxine 50 MICROGram(s) Oral daily  montelukast 10 milliGRAM(s) Oral daily  oxyCODONE    5 mG/acetaminophen 325 mG 1 Tablet(s) Oral every 4 hours PRN  oxyCODONE    5 mG/acetaminophen 325 mG 2 Tablet(s) Oral every 6 hours PRN  pantoprazole    Tablet 40 milliGRAM(s) Oral before breakfast  senna 2 Tablet(s) Oral at bedtime  sodium chloride 0.9%. 1000 milliLiter(s) IV Continuous <Continuous>  spironolactone 25 milliGRAM(s) Oral daily  ticagrelor 90 milliGRAM(s) Oral two times a day  trimethoprim   80 mG/sulfamethoxazole 400 mG 1 Tablet(s) Oral every 12 hours      PHYSICAL EXAM    Subjective: "I'm anxious."   Neurology: alert and oriented x 3, nonfocal, no gross deficits  CV : tele:  RSR   Lungs: clear diminished at the bases.  RR easy, unlabored   Abdomen: soft, nontender, nondistended, positive bowel sounds, + bowel movement   Neg N/V/D; obese abdomen    :  pt voiding without difficulty   Extremities:   CUADRA; +1 LE edema, neg calf tenderness.   PPP bilaterally; right groin cdi w/ dressing- neg hematoma

## 2019-06-15 NOTE — PROGRESS NOTE ADULT - ASSESSMENT
74 y/o female with PMHx of HTN, DM, CAD s/p CABG (LIMA to LAD, SVG to OM, SVG to PDA at [facility] 2014), HFrEF (LVEF 25%) severe MR, severe pulm HTN, hypothyroidism, admitted to [facility] for SOB, now transferred to [facility] for [facility] Clip Eval    MERVIN  Likely sec to CHF/renal vasocongestion   Renal function fluctuates slightly likely sec to CHF. - currently improving   Monitor bmp  Diuretics per cardio  IF renal function worsens, would hold off Spirinolactone   Avoid nephrotoxic agents      CKD stage 3  baseline cr 1.5-1.8  likely sec to HTN/DM/chf  Monitor renal function     SOB  likely sec to HF   Monitor daily weight and i/o  Diuretics per cardio  Patient with severe MR, s/p  Mitral Clip on 6/13

## 2019-06-15 NOTE — PROGRESS NOTE ADULT - PROBLEM SELECTOR PLAN 1
-post op care per CT surgery   -asa, statin, brillinta, hepSQ  -pulmonary toileting, incentive spirometer   -OOB, mobilize   -glycemic control, ISS and HS lantus  -pain control   -wound care and assessment -post clip TTE   -monitor tele/arrythmia ppx   -discharge planning / PT eval  -Cont diuresis continue postop care  continue diuresis  ck Echo today   continue asa, statin, brillinta/ hydralazine 20 mg po q8   pulmonary toileting, incentive spirometer  increase activity as tolerated   pain control   Discharge planning- rehab monday

## 2019-06-15 NOTE — PROGRESS NOTE ADULT - ASSESSMENT
73 F PMH HTN, T2DM, CAD s/p CABG (LIMA to LAD, SVG to OM, SVG to PDA at [facility] 2014), HFrEF (LVEF 25%) severe MR, severe pulm HTN, hypothyroidism, [facility] on 6/1/19 with sob, increased work of breathing, f/w ADHF and severe MR, cardiorenal syndrome with duncan on ckd, now s/p IV diuresis and transferred over for eval for mitral clip procedure in the setting of recurrent readmissions for ADHF exacerbation.      6/13 s/p mitral clip, post op requiring vasopressors for hypotension  6/14 VSS, s/p 1 PRBC, transferred to [facility]. Tolerating current medical regiment. Cont diuretics.  Follow up ECHO. 73 F PMH HTN, T2DM, CAD s/p CABG (LIMA to LAD, SVG to OM, SVG to PDA at [facility] 2014), HFrEF (LVEF 25%) severe MR, severe pulm HTN, hypothyroidism, presents from [facility] to [facility] initially on 6/1/19 with sob, increased work of breathing, f/w ADHF and severe MR, cardiorenal syndrome with duncan on ckd, now s/p IV diuresis and transferred over for eval for mitral clip procedure in the setting of recurrent readmissions for ADHF exacerbation.      6/13 s/p mitral clip, post op requiring vasopressors for hypotension  6/14 VSS, s/p 1 PRBC, transferred to [facility]. Tolerating current medical regiment. Cont diuretics.    6/15 VSS; continue diuresis; xanax initiated for anxiety; ck postop Echo; continue bactrim for a UTI; endo consulted today for uncontrolled dm  Discharge planning- rehab mon

## 2019-06-15 NOTE — PROGRESS NOTE ADULT - SUBJECTIVE AND OBJECTIVE BOX
[facility]   MD RAHEEL SHAH MD RUORU WONG, PA    TEL:  OFFICE: [phone]  DR SANTOYO CELL: [phone]  JUSTEN KENDALL CELL: [phone]  DR. NGUYEN CELL: [phone]    RENAL FOLLOW UP NOTE  --------------------------------------------------------------------------------  HPI:      Pt seen and examined at bedside.       PAST HISTORY  --------------------------------------------------------------------------------  No significant changes to PMH, PSH, FHx, SHx, unless otherwise noted    ALLERGIES & MEDICATIONS  --------------------------------------------------------------------------------  Allergies    azithromycin (Hives; Pruritus)    Intolerances      Standing Inpatient Medications  aspirin enteric coated 81 milliGRAM(s) Oral daily  atorvastatin 40 milliGRAM(s) Oral at bedtime  buMETAnide Injectable 2 milliGRAM(s) IV Push every 12 hours  dextrose 5%. 1000 milliLiter(s) IV Continuous <Continuous>  dextrose 50% Injectable 12.5 Gram(s) IV Push once  dextrose 50% Injectable 25 Gram(s) IV Push once  dextrose 50% Injectable 25 Gram(s) IV Push once  docusate sodium 100 milliGRAM(s) Oral two times a day  heparin  Injectable 5000 Unit(s) SubCutaneous every 12 hours  hydrALAZINE 20 milliGRAM(s) Oral three times a day  insulin glargine Injectable (LANTUS) 40 Unit(s) SubCutaneous at bedtime  insulin lispro (HumaLOG) corrective regimen sliding scale   SubCutaneous three times a day before meals  insulin lispro (HumaLOG) corrective regimen sliding scale   SubCutaneous at bedtime  insulin lispro Injectable (HumaLOG) 16 Unit(s) SubCutaneous three times a day before meals  isosorbide   dinitrate Tablet (ISORDIL) 10 milliGRAM(s) Oral three times a day  levothyroxine 50 MICROGram(s) Oral daily  montelukast 10 milliGRAM(s) Oral daily  pantoprazole    Tablet 40 milliGRAM(s) Oral before breakfast  senna 2 Tablet(s) Oral at bedtime  sodium chloride 0.9%. 1000 milliLiter(s) IV Continuous <Continuous>  spironolactone 25 milliGRAM(s) Oral daily  ticagrelor 90 milliGRAM(s) Oral two times a day  trimethoprim   80 mG/sulfamethoxazole 400 mG 1 Tablet(s) Oral every 12 hours    PRN Inpatient Medications  acetaminophen   Tablet .. 650 milliGRAM(s) Oral every 6 hours PRN  ALPRAZolam 0.25 milliGRAM(s) Oral every 8 hours PRN  dextrose 40% Gel 15 Gram(s) Oral once PRN  glucagon  Injectable 1 milliGRAM(s) IntraMuscular once PRN  oxyCODONE    5 mG/acetaminophen 325 mG 1 Tablet(s) Oral every 4 hours PRN  oxyCODONE    5 mG/acetaminophen 325 mG 2 Tablet(s) Oral every 6 hours PRN      REVIEW OF SYSTEMS  --------------------------------------------------------------------------------  General: no fever  RESP: + sob, no cough  ABD: no abdominal pain  MSK: no edema     VITALS/PHYSICAL EXAM  --------------------------------------------------------------------------------  T(C): 36.7 (06-15-19 @ 05:00), Max: 36.8 (06-14-19 @ 19:20)  HR: 89 (06-15-19 @ 05:00) (80 - 107)  BP: 103/64 (06-15-19 @ 05:00) (103/64 - 137/72)  RR: 18 (06-15-19 @ 05:00) (18 - 18)  SpO2: 100% (06-15-19 @ 05:00) (96% - 100%)  Wt(kg): --        06-14-19 @ 07:01  -  06-15-19 @ 07:00  --------------------------------------------------------  IN: 620 mL / OUT: 1150 mL / NET: -530 mL    06-15-19 @ 07:01  -  06-15-19 @ 13:11  --------------------------------------------------------  IN: 240 mL / OUT: 0 mL / NET: 240 mL      Physical Exam:  	Gen: NAD  	HEENT: MMM  	Pulm: Crackles B/L  	CV: S1S2  	Abd: Soft, +BS  	Ext: No LE edema B/L                      Neuro: Awake   	Skin: Warm and Dry   	Gu no polo    LABS/STUDIES  --------------------------------------------------------------------------------              8.8    9.4   >-----------<  352      [06-15-19 @ 05:50]              27.7     136  |  103  |  49  ----------------------------<  346      [06-15-19 @ 05:50]  4.1   |  19  |  1.83        Ca     8.3     [06-15-19 @ 05:50]      Mg     2.0     [06-14-19 @ 00:56]    TPro  7.7  /  Alb  4.0  /  TBili  0.2  /  DBili  x   /  AST  17  /  ALT  15  /  AlkPhos  112  [06-15-19 @ 05:50]    PT/INR: PT 13.5 , INR 1.18       [06-14-19 @ 00:56]  PTT: 36.8       [06-14-19 @ 00:56]      Creatinine Trend:  SCr 1.83 [06-15 @ 05:50]  SCr 2.18 [06-14 @ 00:56]  SCr 2.30 [06-13 @ 13:39]  SCr 2.37 [06-13 @ 05:33]  SCr 2.26 [06-12 @ 06:41]    Urinalysis - [06-14-19 @ 16:33]      Color Light Yellow / Appearance Clear / SG 1.016 / pH 5.5      Gluc 500 mg/dL / Ketone Negative  / Bili Negative / Urobili Negative       Blood Trace / Protein 30 mg/dL / Leuk Est Small / Nitrite Negative      RBC 10 / WBC 10 / Hyaline 2 / Gran  / Sq Epi  / Non Sq Epi 1 / Bacteria Negative      .4 (Ca --)      [04-25-19 @ 05:45]   --  PTH 43.55 (Ca --)      [09-10-18 @ 07:15]   --  PTH 36.60 (Ca --)      [09-08-18 @ 03:15]   --  Vitamin D (25OH) 25.9      [05-01-19 @ 04:00]  HbA1c 7.8      [06-01-19 @ 08:00]  TSH 0.75      [06-01-19 @ 08:00]  Lipid: chol 148, , HDL 35,       [06-01-19 @ 08:00]

## 2019-06-16 LAB
ANION GAP SERPL CALC-SCNC: 13 MMOL/L — SIGNIFICANT CHANGE UP (ref 5–17)
BUN SERPL-MCNC: 37 MG/DL — HIGH (ref 7–23)
CALCIUM SERPL-MCNC: 8.9 MG/DL — SIGNIFICANT CHANGE UP (ref 8.4–10.5)
CHLORIDE SERPL-SCNC: 101 MMOL/L — SIGNIFICANT CHANGE UP (ref 96–108)
CO2 SERPL-SCNC: 19 MMOL/L — LOW (ref 22–31)
CREAT SERPL-MCNC: 2.03 MG/DL — HIGH (ref 0.5–1.3)
GLUCOSE BLDC GLUCOMTR-MCNC: 154 MG/DL — HIGH (ref 70–99)
GLUCOSE BLDC GLUCOMTR-MCNC: 171 MG/DL — HIGH (ref 70–99)
GLUCOSE BLDC GLUCOMTR-MCNC: 182 MG/DL — HIGH (ref 70–99)
GLUCOSE BLDC GLUCOMTR-MCNC: 221 MG/DL — HIGH (ref 70–99)
GLUCOSE BLDC GLUCOMTR-MCNC: 291 MG/DL — HIGH (ref 70–99)
GLUCOSE SERPL-MCNC: 218 MG/DL — HIGH (ref 70–99)
HCT VFR BLD CALC: 25.7 % — LOW (ref 34.5–45)
HGB BLD-MCNC: 8.8 G/DL — LOW (ref 11.5–15.5)
MCHC RBC-ENTMCNC: 29.5 PG — SIGNIFICANT CHANGE UP (ref 27–34)
MCHC RBC-ENTMCNC: 34.2 GM/DL — SIGNIFICANT CHANGE UP (ref 32–36)
MCV RBC AUTO: 86.4 FL — SIGNIFICANT CHANGE UP (ref 80–100)
PLATELET # BLD AUTO: 337 K/UL — SIGNIFICANT CHANGE UP (ref 150–400)
POTASSIUM SERPL-MCNC: 4.7 MMOL/L — SIGNIFICANT CHANGE UP (ref 3.5–5.3)
POTASSIUM SERPL-SCNC: 4.7 MMOL/L — SIGNIFICANT CHANGE UP (ref 3.5–5.3)
RBC # BLD: 2.97 M/UL — LOW (ref 3.8–5.2)
RBC # FLD: 15.5 % — HIGH (ref 10.3–14.5)
SODIUM SERPL-SCNC: 133 MMOL/L — LOW (ref 135–145)
WBC # BLD: 12.1 K/UL — HIGH (ref 3.8–10.5)
WBC # FLD AUTO: 12.1 K/UL — HIGH (ref 3.8–10.5)

## 2019-06-16 PROCEDURE — 71045 X-RAY EXAM CHEST 1 VIEW: CPT | Mod: 26

## 2019-06-16 RX ORDER — COLCHICINE 0.6 MG
0.6 TABLET ORAL EVERY 12 HOURS
Refills: 0 | Status: COMPLETED | OUTPATIENT
Start: 2019-06-16 | End: 2019-06-16

## 2019-06-16 RX ORDER — COLCHICINE 0.6 MG
0.6 TABLET ORAL DAILY
Refills: 0 | Status: DISCONTINUED | OUTPATIENT
Start: 2019-06-17 | End: 2019-06-17

## 2019-06-16 RX ORDER — BUMETANIDE 0.25 MG/ML
2 INJECTION INTRAMUSCULAR; INTRAVENOUS EVERY 12 HOURS
Refills: 0 | Status: DISCONTINUED | OUTPATIENT
Start: 2019-06-16 | End: 2019-06-17

## 2019-06-16 RX ADMIN — Medication 0.6 MILLIGRAM(S): at 12:49

## 2019-06-16 RX ADMIN — INSULIN GLARGINE 40 UNIT(S): 100 INJECTION, SOLUTION SUBCUTANEOUS at 21:49

## 2019-06-16 RX ADMIN — SENNA PLUS 2 TABLET(S): 8.6 TABLET ORAL at 21:46

## 2019-06-16 RX ADMIN — Medication 16 UNIT(S): at 12:49

## 2019-06-16 RX ADMIN — Medication 16 UNIT(S): at 08:14

## 2019-06-16 RX ADMIN — TICAGRELOR 90 MILLIGRAM(S): 90 TABLET ORAL at 05:59

## 2019-06-16 RX ADMIN — Medication 0.6 MILLIGRAM(S): at 21:51

## 2019-06-16 RX ADMIN — ISOSORBIDE DINITRATE 10 MILLIGRAM(S): 5 TABLET ORAL at 05:59

## 2019-06-16 RX ADMIN — Medication 100 MILLIGRAM(S): at 17:13

## 2019-06-16 RX ADMIN — Medication 16 UNIT(S): at 17:13

## 2019-06-16 RX ADMIN — PANTOPRAZOLE SODIUM 40 MILLIGRAM(S): 20 TABLET, DELAYED RELEASE ORAL at 05:59

## 2019-06-16 RX ADMIN — Medication 20 MILLIGRAM(S): at 14:55

## 2019-06-16 RX ADMIN — BUMETANIDE 2 MILLIGRAM(S): 0.25 INJECTION INTRAMUSCULAR; INTRAVENOUS at 17:13

## 2019-06-16 RX ADMIN — Medication 20 MILLIGRAM(S): at 05:58

## 2019-06-16 RX ADMIN — HEPARIN SODIUM 5000 UNIT(S): 5000 INJECTION INTRAVENOUS; SUBCUTANEOUS at 06:03

## 2019-06-16 RX ADMIN — ISOSORBIDE DINITRATE 10 MILLIGRAM(S): 5 TABLET ORAL at 14:55

## 2019-06-16 RX ADMIN — Medication 100 MILLIGRAM(S): at 05:58

## 2019-06-16 RX ADMIN — Medication 20 MILLIGRAM(S): at 21:47

## 2019-06-16 RX ADMIN — Medication 81 MILLIGRAM(S): at 12:49

## 2019-06-16 RX ADMIN — Medication 1 TABLET(S): at 05:59

## 2019-06-16 RX ADMIN — SPIRONOLACTONE 25 MILLIGRAM(S): 25 TABLET, FILM COATED ORAL at 05:59

## 2019-06-16 RX ADMIN — MONTELUKAST 10 MILLIGRAM(S): 4 TABLET, CHEWABLE ORAL at 21:47

## 2019-06-16 RX ADMIN — Medication 6: at 17:13

## 2019-06-16 RX ADMIN — ATORVASTATIN CALCIUM 40 MILLIGRAM(S): 80 TABLET, FILM COATED ORAL at 21:47

## 2019-06-16 RX ADMIN — Medication 2: at 12:50

## 2019-06-16 RX ADMIN — Medication 4: at 08:14

## 2019-06-16 RX ADMIN — HEPARIN SODIUM 5000 UNIT(S): 5000 INJECTION INTRAVENOUS; SUBCUTANEOUS at 17:13

## 2019-06-16 RX ADMIN — Medication 50 MICROGRAM(S): at 05:59

## 2019-06-16 RX ADMIN — Medication 1 TABLET(S): at 17:12

## 2019-06-16 RX ADMIN — BUMETANIDE 2 MILLIGRAM(S): 0.25 INJECTION INTRAMUSCULAR; INTRAVENOUS at 06:29

## 2019-06-16 RX ADMIN — Medication 0.25 MILLIGRAM(S): at 08:53

## 2019-06-16 RX ADMIN — ISOSORBIDE DINITRATE 10 MILLIGRAM(S): 5 TABLET ORAL at 21:47

## 2019-06-16 RX ADMIN — TICAGRELOR 90 MILLIGRAM(S): 90 TABLET ORAL at 17:12

## 2019-06-16 NOTE — PROGRESS NOTE ADULT - PROBLEM SELECTOR PLAN 1
continue postop care  continue diuresis - Bumex changed to PO , aldactone d/c'd 6/16, SCr 2. Con't to monitor, appreciate renal recs  TTE 6/15 - minimal MR - severe LV dysfunction   continue asa, statin, brillinta/ hydralazine 20 mg po q8   Heart failure consulted for optimization   pulmonary toileting, incentive spirometer  increase activity as tolerated, OOB and ambulate per PT   pain control   colchicine started for R foot gout.   Discharge planning- rehab monday

## 2019-06-16 NOTE — PROGRESS NOTE ADULT - SUBJECTIVE AND OBJECTIVE BOX
[facility]   MD RAHEEL SHAH MD RUORU WONG, PA    TEL:  OFFICE: [phone]  DR SANTOYO CELL: [phone]  JUSTEN KENDALL CELL: [phone]  DR. NGUYEN CELL: [phone]    RENAL FOLLOW UP NOTE  --------------------------------------------------------------------------------  HPI:      Pt seen and examined at bedside.   sitting up in chair   intermittent sob    PAST HISTORY  --------------------------------------------------------------------------------  No significant changes to PMH, PSH, FHx, SHx, unless otherwise noted    ALLERGIES & MEDICATIONS  --------------------------------------------------------------------------------  Allergies    azithromycin (Hives; Pruritus)    Intolerances      Standing Inpatient Medications  aspirin enteric coated 81 milliGRAM(s) Oral daily  atorvastatin 40 milliGRAM(s) Oral at bedtime  buMETAnide 2 milliGRAM(s) Oral every 12 hours  colchicine 0.6 milliGRAM(s) Oral every 12 hours  dextrose 5%. 1000 milliLiter(s) IV Continuous <Continuous>  dextrose 50% Injectable 12.5 Gram(s) IV Push once  dextrose 50% Injectable 25 Gram(s) IV Push once  dextrose 50% Injectable 25 Gram(s) IV Push once  docusate sodium 100 milliGRAM(s) Oral two times a day  heparin  Injectable 5000 Unit(s) SubCutaneous every 12 hours  hydrALAZINE 20 milliGRAM(s) Oral three times a day  insulin glargine Injectable (LANTUS) 40 Unit(s) SubCutaneous at bedtime  insulin lispro (HumaLOG) corrective regimen sliding scale   SubCutaneous three times a day before meals  insulin lispro (HumaLOG) corrective regimen sliding scale   SubCutaneous at bedtime  insulin lispro Injectable (HumaLOG) 16 Unit(s) SubCutaneous three times a day before meals  isosorbide   dinitrate Tablet (ISORDIL) 10 milliGRAM(s) Oral three times a day  levothyroxine 50 MICROGram(s) Oral daily  montelukast 10 milliGRAM(s) Oral daily  pantoprazole    Tablet 40 milliGRAM(s) Oral before breakfast  senna 2 Tablet(s) Oral at bedtime  sodium chloride 0.9%. 1000 milliLiter(s) IV Continuous <Continuous>  spironolactone 25 milliGRAM(s) Oral daily  ticagrelor 90 milliGRAM(s) Oral two times a day  trimethoprim   80 mG/sulfamethoxazole 400 mG 1 Tablet(s) Oral every 12 hours    PRN Inpatient Medications  acetaminophen   Tablet .. 650 milliGRAM(s) Oral every 6 hours PRN  dextrose 40% Gel 15 Gram(s) Oral once PRN  glucagon  Injectable 1 milliGRAM(s) IntraMuscular once PRN      REVIEW OF SYSTEMS  --------------------------------------------------------------------------------  General: no fever  CVS: no chest pain  RESP: + sob, no cough  ABD: no abdominal pain  : no dysuria,  MSK: no edema     VITALS/PHYSICAL EXAM  --------------------------------------------------------------------------------  T(C): 36.7 (06-16-19 @ 05:27), Max: 36.7 (06-16-19 @ 05:27)  HR: 104 (06-16-19 @ 05:27) (98 - 104)  BP: 102/64 (06-16-19 @ 05:27) (102/64 - 111/70)  RR: 18 (06-16-19 @ 05:27) (18 - 18)  SpO2: 100% (06-16-19 @ 05:27) (100% - 100%)  Wt(kg): --        06-15-19 @ 07:01  -  06-16-19 @ 07:00  --------------------------------------------------------  IN: 740 mL / OUT: 1200 mL / NET: -460 mL    06-16-19 @ 07:01  -  06-16-19 @ 10:50  --------------------------------------------------------  IN: 240 mL / OUT: 0 mL / NET: 240 mL      Physical Exam:  	Gen: NAD  	HEENT: MMM  	Pulm: Crackles B/L  	CV: S1S2  	Abd: Soft, +BS  	Ext: No LE edema B/L                      Neuro: Awake   	Skin: Warm and Dry   	    LABS/STUDIES  --------------------------------------------------------------------------------              8.8    12.1  >-----------<  337      [06-16-19 @ 07:08]              25.7     133  |  101  |  37  ----------------------------<  218      [06-16-19 @ 07:08]  4.7   |  19  |  2.03        Ca     8.9     [06-16-19 @ 07:08]    TPro  7.7  /  Alb  4.0  /  TBili  0.2  /  DBili  x   /  AST  17  /  ALT  15  /  AlkPhos  112  [06-15-19 @ 05:50]          Creatinine Trend:  SCr 2.03 [06-16 @ 07:08]  SCr 1.83 [06-15 @ 05:50]  SCr 2.18 [06-14 @ 00:56]  SCr 2.30 [06-13 @ 13:39]  SCr 2.37 [06-13 @ 05:33]    Urinalysis - [06-14-19 @ 16:33]      Color Light Yellow / Appearance Clear / SG 1.016 / pH 5.5      Gluc 500 mg/dL / Ketone Negative  / Bili Negative / Urobili Negative       Blood Trace / Protein 30 mg/dL / Leuk Est Small / Nitrite Negative      RBC 10 / WBC 10 / Hyaline 2 / Gran  / Sq Epi  / Non Sq Epi 1 / Bacteria Negative      .4 (Ca --)      [04-25-19 @ 05:45]   --  PTH 43.55 (Ca --)      [09-10-18 @ 07:15]   --  PTH 36.60 (Ca --)      [09-08-18 @ 03:15]   --  Vitamin D (25OH) 25.9      [05-01-19 @ 04:00]  HbA1c 7.8      [06-01-19 @ 08:00]  TSH 0.75      [06-01-19 @ 08:00]  Lipid: chol 148, , HDL 35,       [06-01-19 @ 08:00]

## 2019-06-16 NOTE — PROGRESS NOTE ADULT - ASSESSMENT
74 y/o female with PMHx of HTN, DM, CAD s/p CABG (LIMA to LAD, SVG to OM, SVG to PDA at [facility] 2014), HFrEF (LVEF 25%) severe MR, severe pulm HTN, hypothyroidism, admitted to [facility] for SOB, now transferred to [facility] for [facility] Clip Eval    MERVIN  Likely sec to CHF/renal vasocongestion   Renal function fluctuates slightly likely sec to CHF. -   Monitor bmp  Diuretics per cardio  IF renal function worsens, would hold off Spirinolactone   Avoid nephrotoxic agents      CKD stage 3  baseline cr 1.5-1.8  likely sec to HTN/DM/chf  Monitor renal function     SOB  likely sec to HF   Monitor daily weight and i/o  Diuretics per cardio  Patient with severe MR, s/p  Mitral Clip on 6/13

## 2019-06-16 NOTE — PROGRESS NOTE ADULT - SUBJECTIVE AND OBJECTIVE BOX
Interval Hx; Events Overnight:  SUBJECTIVE: " I feel okay, I am having trouble walking, my feet hurt and I have gout "    LABS:                8.8                  133  | 19   | 37           12.1  >-----------< 337     ------------------------< 218                   25.7                 4.7  | 101  | 2.03                                         Ca 8.9   Mg x     Ph x              VITAL SIGNS    Telemetry: SR      Daily     Daily Weight in k (2019 09:30)      Vital Signs Last 24 Hrs  T(C): 36.7 (19 @ 12:40), Max: 36.7 (19 @ 05:27)  T(F): 98.1 (19 @ 12:40), Max: 98.1 (19 @ 12:40)  HR: 107 (19 @ 12:40) (101 - 107)  BP: 95/58 (19 @ 12:40) (95/58 - 107/68)  RR: 18 (19 @ 12:40) (18 - 18)  SpO2: 99% (19 @ 12:40) (99% - 100%)             I&O's Detail    15 Yovanny 2019 07:  -  2019 07:00  --------------------------------------------------------  IN:    Oral Fluid: 740 mL  Total IN: 740 mL    OUT:    Voided: 1200 mL  Total OUT: 1200 mL    Total NET: -460 mL      2019 07:  -  2019 14:18  --------------------------------------------------------  IN:    Oral Fluid: 480 mL  Total IN: 480 mL    OUT:  Total OUT: 0 mL    Total NET: 480 mL                    GLUCOSE  CAPILLARY BLOOD GLUCOSE      POCT Blood Glucose.: 154 mg/dL (2019 12:03)  POCT Blood Glucose.: 221 mg/dL (2019 07:43)  POCT Blood Glucose.: 171 mg/dL (2019 03:57)  POCT Blood Glucose.: 107 mg/dL (15 Yovanny 2019 21:58)  POCT Blood Glucose.: 182 mg/dL (15 Yovanny 2019 16:33)                  PHYSICAL EXAM      General: NAD, well appearing, in no distress  Neurology: A&O x3, non focal, no neuro deficits. Moves all extremities to command.  CV : s1 s2 RRR, no murmurs, gallops, clicks.   Groin Wound : cdi, intact, no hematoma, no bleeding  Lungs: clear to auscultation  Abdomen: soft, nontender, nondistended, positive bowel sounds, +BM  :    voiding / external catheter  Extremities:    no  edema. + pedal pulses  , R foot acute gout    Skin: intact, no lesions        MEDICATIONS  acetaminophen   Tablet .. 650 milliGRAM(s) Oral every 6 hours PRN  aspirin enteric coated 81 milliGRAM(s) Oral daily  atorvastatin 40 milliGRAM(s) Oral at bedtime  buMETAnide 2 milliGRAM(s) Oral every 12 hours  colchicine 0.6 milliGRAM(s) Oral every 12 hours  dextrose 40% Gel 15 Gram(s) Oral once PRN  dextrose 5%. 1000 milliLiter(s) IV Continuous <Continuous>  dextrose 50% Injectable 12.5 Gram(s) IV Push once  dextrose 50% Injectable 25 Gram(s) IV Push once  dextrose 50% Injectable 25 Gram(s) IV Push once  docusate sodium 100 milliGRAM(s) Oral two times a day  glucagon  Injectable 1 milliGRAM(s) IntraMuscular once PRN  heparin  Injectable 5000 Unit(s) SubCutaneous every 12 hours  hydrALAZINE 20 milliGRAM(s) Oral three times a day  insulin glargine Injectable (LANTUS) 40 Unit(s) SubCutaneous at bedtime  insulin lispro (HumaLOG) corrective regimen sliding scale   SubCutaneous three times a day before meals  insulin lispro (HumaLOG) corrective regimen sliding scale   SubCutaneous at bedtime  insulin lispro Injectable (HumaLOG) 16 Unit(s) SubCutaneous three times a day before meals  isosorbide   dinitrate Tablet (ISORDIL) 10 milliGRAM(s) Oral three times a day  levothyroxine 50 MICROGram(s) Oral daily  montelukast 10 milliGRAM(s) Oral daily  pantoprazole    Tablet 40 milliGRAM(s) Oral before breakfast  senna 2 Tablet(s) Oral at bedtime  sodium chloride 0.9%. 1000 milliLiter(s) IV Continuous <Continuous>  ticagrelor 90 milliGRAM(s) Oral two times a day  trimethoprim   80 mG/sulfamethoxazole 400 mG 1 Tablet(s) Oral every 12 hours

## 2019-06-16 NOTE — PROGRESS NOTE ADULT - PROBLEM SELECTOR PLAN 3
“Patient's name, , procedure and correct site were confirmed during the [place] Timeout.” comfort measures provided  avoid xanax or narcotics   pt needs encouragement - for ADL  cont therapeutic care

## 2019-06-16 NOTE — PROGRESS NOTE ADULT - ASSESSMENT
73 F PMH HTN, T2DM, CAD s/p CABG (LIMA to LAD, SVG to OM, SVG to PDA at [facility] 2014), HFrEF (LVEF 25%) severe MR, severe pulm HTN, hypothyroidism, [facility] on 6/1/19 with sob, increased work of breathing, f/w ADHF and severe MR, cardiorenal syndrome with duncan on ckd, now s/p IV diuresis and transferred over for eval for mitral clip procedure in the setting of recurrent readmissions for ADHF exacerbation.      6/13 s/p mitral clip, post op requiring vasopressors for hypotension  6/14 VSS, s/p 1 PRBC, transferred to [facility]. Tolerating current medical regiment. Cont diuretics.    6/15 VSS; continue diuresis; xanax initiated for anxiety; ck postop Echo; continue bactrim for a UTI; endo consulted today for uncontrolled dm  6/16 VSS: Bumex changed to PO 2 mg BID, aldactone d/c'd. SCr 2 today, renal recs appreciated. Heart failure consulted for medical optimization. Colchicine started for Right foot gout. D/c planning to rehab tomorrow.   Discharge planning- rehab [facility]

## 2019-06-17 DIAGNOSIS — I50.23 ACUTE ON CHRONIC SYSTOLIC (CONGESTIVE) HEART FAILURE: ICD-10-CM

## 2019-06-17 DIAGNOSIS — I25.810 ATHEROSCLEROSIS OF CORONARY ARTERY BYPASS GRAFT(S) WITHOUT ANGINA PECTORIS: ICD-10-CM

## 2019-06-17 DIAGNOSIS — E03.9 HYPOTHYROIDISM, UNSPECIFIED: ICD-10-CM

## 2019-06-17 DIAGNOSIS — N18.9 CHRONIC KIDNEY DISEASE, UNSPECIFIED: ICD-10-CM

## 2019-06-17 DIAGNOSIS — E11.9 TYPE 2 DIABETES MELLITUS WITHOUT COMPLICATIONS: ICD-10-CM

## 2019-06-17 LAB
ALBUMIN SERPL ELPH-MCNC: 3.6 G/DL — SIGNIFICANT CHANGE UP (ref 3.3–5)
ALP SERPL-CCNC: 112 U/L — SIGNIFICANT CHANGE UP (ref 40–120)
ALT FLD-CCNC: 20 U/L — SIGNIFICANT CHANGE UP (ref 10–45)
ANION GAP SERPL CALC-SCNC: 15 MMOL/L — SIGNIFICANT CHANGE UP (ref 5–17)
AST SERPL-CCNC: 14 U/L — SIGNIFICANT CHANGE UP (ref 10–40)
BILIRUB SERPL-MCNC: 0.2 MG/DL — SIGNIFICANT CHANGE UP (ref 0.2–1.2)
BUN SERPL-MCNC: 46 MG/DL — HIGH (ref 7–23)
CALCIUM SERPL-MCNC: 9.3 MG/DL — SIGNIFICANT CHANGE UP (ref 8.4–10.5)
CHLORIDE SERPL-SCNC: 100 MMOL/L — SIGNIFICANT CHANGE UP (ref 96–108)
CO2 SERPL-SCNC: 18 MMOL/L — LOW (ref 22–31)
CREAT SERPL-MCNC: 2.27 MG/DL — HIGH (ref 0.5–1.3)
GLUCOSE BLDC GLUCOMTR-MCNC: 195 MG/DL — HIGH (ref 70–99)
GLUCOSE BLDC GLUCOMTR-MCNC: 242 MG/DL — HIGH (ref 70–99)
GLUCOSE BLDC GLUCOMTR-MCNC: 251 MG/DL — HIGH (ref 70–99)
GLUCOSE BLDC GLUCOMTR-MCNC: 257 MG/DL — HIGH (ref 70–99)
GLUCOSE BLDC GLUCOMTR-MCNC: 305 MG/DL — HIGH (ref 70–99)
GLUCOSE SERPL-MCNC: 260 MG/DL — HIGH (ref 70–99)
HCT VFR BLD CALC: 24.9 % — LOW (ref 34.5–45)
HGB BLD-MCNC: 8.6 G/DL — LOW (ref 11.5–15.5)
MCHC RBC-ENTMCNC: 29.7 PG — SIGNIFICANT CHANGE UP (ref 27–34)
MCHC RBC-ENTMCNC: 34.3 GM/DL — SIGNIFICANT CHANGE UP (ref 32–36)
MCV RBC AUTO: 86.6 FL — SIGNIFICANT CHANGE UP (ref 80–100)
PLATELET # BLD AUTO: 298 K/UL — SIGNIFICANT CHANGE UP (ref 150–400)
POTASSIUM SERPL-MCNC: 4.8 MMOL/L — SIGNIFICANT CHANGE UP (ref 3.5–5.3)
POTASSIUM SERPL-SCNC: 4.8 MMOL/L — SIGNIFICANT CHANGE UP (ref 3.5–5.3)
PROT SERPL-MCNC: 7.3 G/DL — SIGNIFICANT CHANGE UP (ref 6–8.3)
RBC # BLD: 2.88 M/UL — LOW (ref 3.8–5.2)
RBC # FLD: 15.6 % — HIGH (ref 10.3–14.5)
SODIUM SERPL-SCNC: 133 MMOL/L — LOW (ref 135–145)
WBC # BLD: 9.3 K/UL — SIGNIFICANT CHANGE UP (ref 3.8–10.5)
WBC # FLD AUTO: 9.3 K/UL — SIGNIFICANT CHANGE UP (ref 3.8–10.5)

## 2019-06-17 PROCEDURE — 99231 SBSQ HOSP IP/OBS SF/LOW 25: CPT

## 2019-06-17 PROCEDURE — 99223 1ST HOSP IP/OBS HIGH 75: CPT | Mod: GC

## 2019-06-17 PROCEDURE — 71046 X-RAY EXAM CHEST 2 VIEWS: CPT | Mod: 26

## 2019-06-17 PROCEDURE — 71045 X-RAY EXAM CHEST 1 VIEW: CPT | Mod: 26

## 2019-06-17 RX ORDER — HYDRALAZINE HCL 50 MG
25 TABLET ORAL EVERY 8 HOURS
Refills: 0 | Status: DISCONTINUED | OUTPATIENT
Start: 2019-06-17 | End: 2019-06-18

## 2019-06-17 RX ORDER — INSULIN LISPRO 100/ML
22 VIAL (ML) SUBCUTANEOUS
Refills: 0 | Status: DISCONTINUED | OUTPATIENT
Start: 2019-06-17 | End: 2019-06-19

## 2019-06-17 RX ORDER — INSULIN GLARGINE 100 [IU]/ML
48 INJECTION, SOLUTION SUBCUTANEOUS AT BEDTIME
Refills: 0 | Status: DISCONTINUED | OUTPATIENT
Start: 2019-06-17 | End: 2019-06-19

## 2019-06-17 RX ORDER — BUMETANIDE 0.25 MG/ML
2 INJECTION INTRAMUSCULAR; INTRAVENOUS ONCE
Refills: 0 | Status: COMPLETED | OUTPATIENT
Start: 2019-06-17 | End: 2019-06-17

## 2019-06-17 RX ORDER — BUMETANIDE 0.25 MG/ML
4 INJECTION INTRAMUSCULAR; INTRAVENOUS
Refills: 0 | Status: DISCONTINUED | OUTPATIENT
Start: 2019-06-18 | End: 2019-06-18

## 2019-06-17 RX ADMIN — ISOSORBIDE DINITRATE 10 MILLIGRAM(S): 5 TABLET ORAL at 06:21

## 2019-06-17 RX ADMIN — ISOSORBIDE DINITRATE 10 MILLIGRAM(S): 5 TABLET ORAL at 14:06

## 2019-06-17 RX ADMIN — INSULIN GLARGINE 48 UNIT(S): 100 INJECTION, SOLUTION SUBCUTANEOUS at 22:01

## 2019-06-17 RX ADMIN — TICAGRELOR 90 MILLIGRAM(S): 90 TABLET ORAL at 06:28

## 2019-06-17 RX ADMIN — Medication 25 MILLIGRAM(S): at 21:06

## 2019-06-17 RX ADMIN — Medication 100 MILLIGRAM(S): at 06:20

## 2019-06-17 RX ADMIN — Medication 16 UNIT(S): at 08:18

## 2019-06-17 RX ADMIN — Medication 22 UNIT(S): at 17:05

## 2019-06-17 RX ADMIN — Medication 1 TABLET(S): at 17:09

## 2019-06-17 RX ADMIN — Medication 4: at 22:00

## 2019-06-17 RX ADMIN — Medication 50 MICROGRAM(S): at 06:21

## 2019-06-17 RX ADMIN — Medication 650 MILLIGRAM(S): at 22:06

## 2019-06-17 RX ADMIN — BUMETANIDE 2 MILLIGRAM(S): 0.25 INJECTION INTRAMUSCULAR; INTRAVENOUS at 17:06

## 2019-06-17 RX ADMIN — Medication 16 UNIT(S): at 12:11

## 2019-06-17 RX ADMIN — Medication 1 TABLET(S): at 06:21

## 2019-06-17 RX ADMIN — Medication 20 MILLIGRAM(S): at 06:20

## 2019-06-17 RX ADMIN — Medication 20 MILLIGRAM(S): at 14:06

## 2019-06-17 RX ADMIN — TICAGRELOR 90 MILLIGRAM(S): 90 TABLET ORAL at 17:09

## 2019-06-17 RX ADMIN — Medication 81 MILLIGRAM(S): at 12:12

## 2019-06-17 RX ADMIN — SENNA PLUS 2 TABLET(S): 8.6 TABLET ORAL at 21:06

## 2019-06-17 RX ADMIN — Medication 0.6 MILLIGRAM(S): at 12:13

## 2019-06-17 RX ADMIN — HEPARIN SODIUM 5000 UNIT(S): 5000 INJECTION INTRAVENOUS; SUBCUTANEOUS at 06:21

## 2019-06-17 RX ADMIN — MONTELUKAST 10 MILLIGRAM(S): 4 TABLET, CHEWABLE ORAL at 21:06

## 2019-06-17 RX ADMIN — ATORVASTATIN CALCIUM 40 MILLIGRAM(S): 80 TABLET, FILM COATED ORAL at 21:06

## 2019-06-17 RX ADMIN — Medication 2: at 17:05

## 2019-06-17 RX ADMIN — Medication 4: at 12:11

## 2019-06-17 RX ADMIN — Medication 650 MILLIGRAM(S): at 22:36

## 2019-06-17 RX ADMIN — Medication 6: at 08:18

## 2019-06-17 RX ADMIN — HEPARIN SODIUM 5000 UNIT(S): 5000 INJECTION INTRAVENOUS; SUBCUTANEOUS at 17:07

## 2019-06-17 RX ADMIN — ISOSORBIDE DINITRATE 10 MILLIGRAM(S): 5 TABLET ORAL at 21:06

## 2019-06-17 RX ADMIN — PANTOPRAZOLE SODIUM 40 MILLIGRAM(S): 20 TABLET, DELAYED RELEASE ORAL at 06:21

## 2019-06-17 RX ADMIN — Medication 100 MILLIGRAM(S): at 17:07

## 2019-06-17 RX ADMIN — BUMETANIDE 2 MILLIGRAM(S): 0.25 INJECTION INTRAMUSCULAR; INTRAVENOUS at 06:22

## 2019-06-17 NOTE — CONSULT NOTE ADULT - PROBLEM SELECTOR PROBLEM 4
Coronary artery disease involving coronary bypass graft of native heart without angina pectoris
Chronic kidney disease, unspecified CKD stage

## 2019-06-17 NOTE — CONSULT NOTE ADULT - SUBJECTIVE AND OBJECTIVE BOX
74 yo woman w/ hx of HTN, T2DM, CAD s/p CABG 2014 (LIMA to LAD, SVG to OM, SVG to PDA at [facility] 2014),  non-dilated ICM (EF 20-25%, normal), severe MR s/p clip, severe pulm HTN, hypothyroidism, presents from [facility] to [facility] initially on 6/1/19 with sob, increased wob. Pt had a prior admission in 5/2019 for ADHF and cp s/p LHC showing TVD medically managed. On this current admission, pt was f/w CXR showing pulm edema, probnp >4k, and dyspneic, thus admitted to CCU and started on milrinone gtt and bumex gtt for ADHF. Continued on bipap. Pt had repeat TTE which showed severe MR.  Pt developed acute on chronic kidney injury presumed 2/2 IV/gtt diuresis; milrinone was d/c 6/1/19, and bumex gtt d/c 6/4/19, with renal following for cardiorenal syn/hemodynamically mediated duncan.  Pt currently on po bumex bid, aldactone qd, for maintenance diuresis and PRN bumex IV pushes. Pt was transferred to [facility] for eval for mitral clip procedure.  Currently, pt endorses mild dyspnea, +frequent urination, +weakness. Denies cp, f/c, n/v/d, dysuria, cough.     Vs: 98.2, 85, 107/68, 18, 99%. (05 Jun 2019 21:44)      PMH:   Urinary tract infection  GERD (gastroesophageal reflux disease)  Hypertension  CAD (coronary artery disease)  Hypothyroidism  Hyperlipidemia  Diabetes mellitus, type 2  Diabetes mellitus type 2 in nonobese      PSH:   S/P CABG (coronary artery bypass graft)      Medications:   acetaminophen   Tablet .. 650 milliGRAM(s) Oral every 6 hours PRN  aspirin enteric coated 81 milliGRAM(s) Oral daily  atorvastatin 40 milliGRAM(s) Oral at bedtime  colchicine 0.6 milliGRAM(s) Oral daily  dextrose 40% Gel 15 Gram(s) Oral once PRN  dextrose 5%. 1000 milliLiter(s) IV Continuous <Continuous>  dextrose 50% Injectable 12.5 Gram(s) IV Push once  dextrose 50% Injectable 25 Gram(s) IV Push once  dextrose 50% Injectable 25 Gram(s) IV Push once  docusate sodium 100 milliGRAM(s) Oral two times a day  glucagon  Injectable 1 milliGRAM(s) IntraMuscular once PRN  heparin  Injectable 5000 Unit(s) SubCutaneous every 12 hours  hydrALAZINE 20 milliGRAM(s) Oral three times a day  insulin glargine Injectable (LANTUS) 48 Unit(s) SubCutaneous at bedtime  insulin lispro (HumaLOG) corrective regimen sliding scale   SubCutaneous three times a day before meals  insulin lispro (HumaLOG) corrective regimen sliding scale   SubCutaneous at bedtime  insulin lispro Injectable (HumaLOG) 22 Unit(s) SubCutaneous three times a day before meals  isosorbide   dinitrate Tablet (ISORDIL) 10 milliGRAM(s) Oral three times a day  levothyroxine 50 MICROGram(s) Oral daily  montelukast 10 milliGRAM(s) Oral daily  pantoprazole    Tablet 40 milliGRAM(s) Oral before breakfast  senna 2 Tablet(s) Oral at bedtime  sodium chloride 0.9%. 1000 milliLiter(s) IV Continuous <Continuous>  ticagrelor 90 milliGRAM(s) Oral two times a day  trimethoprim   80 mG/sulfamethoxazole 400 mG 1 Tablet(s) Oral every 12 hours      Allergies:  azithromycin (Hives; Pruritus)      FAMILY HISTORY:  Family history of hypertension (Sibling): sister  Family history of diabetes mellitus (Sibling): brothers/sisters      Social History:  Smoking History:  Alcohol Use:  Drug Use:    Review of Systems:  REVIEW OF SYSTEMS:  CONSTITUTIONAL: No weakness, fevers or chills  EYES/ENT: No visual changes;  No dysphagia  NECK: No pain or stiffness  RESPIRATORY: No cough, wheezing, hemoptysis; No shortness of breath  CARDIOVASCULAR: No chest pain or palpitations; No lower extremity edema  GASTROINTESTINAL: No abdominal or epigastric pain. No nausea, vomiting, or hematemesis; No diarrhea or constipation. No melena or hematochezia.  BACK: No back pain  GENITOURINARY: No dysuria, frequency or hematuria  NEUROLOGICAL: No numbness or weakness  SKIN: No itching, burning, rashes, or lesions   All other review of systems is negative unless indicated above.    Physical Exam:  T(F): 98.5 (06-17), Max: 99.9 (06-16)  HR: 96 (06-17) (71 - 103)  BP: 94/55 (06-17) (94/55 - 112/71)  RR: 18 (06-17)  SpO2: 99% (06-17)  GENERAL: No acute distress, well-developed  HEAD:  Atraumatic, Normocephalic  ENT: EOMI, PERRLA, conjunctiva and sclera clear, Neck supple, No JVD, moist mucosa  CHEST/LUNG: Clear to auscultation bilaterally; No wheeze, equal breath sounds bilaterally   BACK: No spinal tenderness  HEART: Regular rate and rhythm; No murmurs, rubs, or gallops  ABDOMEN: Soft, Nontender, Nondistended; Bowel sounds present  EXTREMITIES:  No clubbing, cyanosis, or edema  PSYCH: Nl behavior, nl affect  NEUROLOGY: AAOx3, non-focal, cranial nerves intact  SKIN: Normal color, No rashes or lesions  LINES:    Cardiovascular Diagnostic Testing:    ECG: Personally reviewed    Echo:    Stress Testing:    Cath:    Interpretation of Telemetry:    Imaging:    Labs: Personally reviewed                        8.6    9.3   )-----------( 298      ( 17 Jun 2019 06:26 )             24.9     06-17    133<L>  |  100  |  46<H>  ----------------------------<  260<H>  4.8   |  18<L>  |  2.27<H>    Ca    9.3      17 Jun 2019 06:26    TPro  7.3  /  Alb  3.6  /  TBili  0.2  /  DBili  x   /  AST  14  /  ALT  20  /  AlkPhos  112  06-17 72 yo woman w/ hx of HTN, T2DM, CAD s/p CABG  (LIMA to LAD, SVG to OM, SVG to PDA at [facility] ),  non-dilated ICM (EF 20-25%, normal), severe MR s/p mitral clip , hypothyroidism who initially presented from Rehab to [facility]  w/ acute on chronic systolic heart failure.  Diuresed aggressive w/ diuretic infusion and placed on ionotropes due to concerns of compromised cardiac output.  Clinical status improved and was transferred to [facility] for mitral clip procedure.  Underwent procedure on  w/o issues.  Since procedure; started on IV diuretics and was transitioned to PO as patient was thought to be euvolemic.  HF service consulted for further recs.        PMH:   Urinary tract infection  GERD (gastroesophageal reflux disease)  Hypertension  CAD (coronary artery disease)  Hypothyroidism  Hyperlipidemia  Diabetes mellitus, type 2  Diabetes mellitus type 2 in nonobese      PSH:   S/P CABG (coronary artery bypass graft)      Medications:   acetaminophen   Tablet .. 650 milliGRAM(s) Oral every 6 hours PRN  aspirin enteric coated 81 milliGRAM(s) Oral daily  atorvastatin 40 milliGRAM(s) Oral at bedtime  colchicine 0.6 milliGRAM(s) Oral daily  dextrose 40% Gel 15 Gram(s) Oral once PRN  dextrose 5%. 1000 milliLiter(s) IV Continuous <Continuous>  dextrose 50% Injectable 12.5 Gram(s) IV Push once  dextrose 50% Injectable 25 Gram(s) IV Push once  dextrose 50% Injectable 25 Gram(s) IV Push once  docusate sodium 100 milliGRAM(s) Oral two times a day  glucagon  Injectable 1 milliGRAM(s) IntraMuscular once PRN  heparin  Injectable 5000 Unit(s) SubCutaneous every 12 hours  hydrALAZINE 20 milliGRAM(s) Oral three times a day  insulin glargine Injectable (LANTUS) 48 Unit(s) SubCutaneous at bedtime  insulin lispro (HumaLOG) corrective regimen sliding scale   SubCutaneous three times a day before meals  insulin lispro (HumaLOG) corrective regimen sliding scale   SubCutaneous at bedtime  insulin lispro Injectable (HumaLOG) 22 Unit(s) SubCutaneous three times a day before meals  isosorbide   dinitrate Tablet (ISORDIL) 10 milliGRAM(s) Oral three times a day  levothyroxine 50 MICROGram(s) Oral daily  montelukast 10 milliGRAM(s) Oral daily  pantoprazole    Tablet 40 milliGRAM(s) Oral before breakfast  senna 2 Tablet(s) Oral at bedtime  sodium chloride 0.9%. 1000 milliLiter(s) IV Continuous <Continuous>  ticagrelor 90 milliGRAM(s) Oral two times a day  trimethoprim   80 mG/sulfamethoxazole 400 mG 1 Tablet(s) Oral every 12 hours      Allergies:  azithromycin (Hives; Pruritus)      FAMILY HISTORY:  Family history of hypertension (Sibling): sister  Family history of diabetes mellitus (Sibling): brothers/sisters      Social History:  Smoking History:  Alcohol Use:  Drug Use:    Review of Systems:  REVIEW OF SYSTEMS:  CONSTITUTIONAL: No weakness, fevers or chills  EYES/ENT: No visual changes;  No dysphagia  NECK: No pain or stiffness  RESPIRATORY: No cough, wheezing, hemoptysis; +shortness of breath; improved   CARDIOVASCULAR: No chest pain or palpitations; No lower extremity edema  GASTROINTESTINAL: +abdominal distention.  no epigastric pain. No nausea, vomiting, or hematemesis; No diarrhea or constipation. No melena or hematochezia.  BACK: No back pain  GENITOURINARY: No dysuria, frequency or hematuria  NEUROLOGICAL: No numbness or weakness  SKIN: No itching, burning, rashes, or lesions   All other review of systems is negative unless indicated above.    Physical Exam:  T(F): 98.5 (-), Max: 99.9 (-16)  HR: 96 (-17) (71 - 103)  BP: 94/55 (-17) (94/55 - 112/71)  RR: 18 (-17)  SpO2: 99% (-)    GENERAL: No acute distress, well-developed  HEAD:  Atraumatic, Normocephalic  ENT: EOMI, PERRLA, conjunctiva and sclera clear, Neck supple, +JVP mod elevated (~ 10-12 cm)  CHEST/LUNG: Clear to auscultation bilaterally; No wheeze, equal breath sounds bilaterally   BACK: No spinal tenderness  HEART: Regular rate and rhythm; No murmurs, rubs, or gallops  ABDOMEN: Soft, Nontender, +distended w/ normal Bowel sounds  EXTREMITIES:  No clubbing, cyanosis, or edema  PSYCH: Nl behavior, nl affect  NEUROLOGY: AAOx3, non-focal, cranial nerves intact  SKIN: Normal color, No rashes or lesions  LINES:    Cardiovascular Diagnostic Testing:      Echo:    < from: Transthoracic Echocardiogram (06.15.19 @ 12:39) >  Mitral Valve: Mitral Clip is seen in the mitral valve  position. Minimal mitral regurgitation. Mean transmitral  valve gradient equals 9 mm Hg (HR 98).  Aortic Valve/Aorta: Aortic valve not well visualized.  Normal aortic root size.(Ao: 2.5 cm at the sinuses of  Valsalva).  Left Atrium: Normal left atrium.  Left Ventricle: Severe global left ventricular systolic  dysfunction.  Right Heart: Normal right atrium. Normal right ventricular  size and function.  Pericardium/Pleura: Normal pericardium with no pericardial  effusion.  ------------------------------------------------------------------------  Conclusions:  1. Mitral Clip is seen in the mitral valve position.  Minimal mitral regurgitation. Mean transmitral valve  gradient equals 9 mm Hg (HR 98).  2. Severe global left ventricular systolic dysfunction.  3. Normal right ventricular size and function.    < end of copied text >      Cath:    < from: Cardiac Cath Lab - Adult (19 @ 17:37) >  CONTRAST GIVEN: Visipaque 10 ml.  MEDICATIONS GIVEN: 2% Lidocaine, 10 ml, subcutaneously. 0.9% Normal saline,  10 ml,IV.  HEMODYNAMICS: There is severe pulmonary hypertension.  VENTRICLES: No left ventriculogram was performed.  CORONARY VESSELS: The coronary circulation is right dominant.  LM:   --  Distal left main: There was a 0 % stenosis at the site of a prior  stent.  LAD:   --  Proximal LAD: There was a tubular 70 % stenosis. There was PARUL  grade 3 flow through the vessel (brisk flow).  --  Mid LAD: There was a 100 % stenosis. There was PARUL grade 0 flow  through the vessel (no flow).  CX:   --  Circumflex: Angiography showed minor luminal irregularities with  no flow limiting lesions.  RI:   --  Ostial ramus intermedius: The distal vessel was supplied by  collaterals from the LAD. There was a 100 % stenosis at the site of a  prior stent. There was PARUL grade 0 flow through the vessel (no flow).  RCA:   --  Mid RCA: The distal vessel was supplied by collaterals from  septal branches of LAD. There was a 100 % stenosis just after RV marginal  2. There was PARUL grade 0 flow through the vessel (no flow).  GRAFTS:   --  Graft to the mid LAD: The graft was a LIMA. It was normal.  COMPLICATIONS: There were no complications.  DIAGNOSTIC RECOMMENDATIONS: Patient management should include close  monitoring of BUN and creatinine. Medical management is recommended.  Prepared and signed by  Tor Prado M.D.  Signed 2019 18:53:31  HEMODYNAMIC TABLES  Pressures:  NO PHASE  Pressures:  - HR: 80  Pressures:  - Rhythm:  Pressures:  -- Aortic Pressure (S/D/M): 105/44/68  Pressures:  -- Left Ventricle (s/edp): 109/25/--  Pressures:  -- Pulmonary Artery (S/D/M): 69/20/41  Pressures:  -- Pulmonary Capillary Wedge: 25/43/27  Pressures:  -- Right Atrium (a/v/M): 18/18/14  Pressures:  -- Right Ventricle (s/edp): 71/17/--  O2 Sats:  NO PHASE  O2 Sats: - HR: 80  O2 Sats:  - Rhythm:  O2 Sats:  -- AO: 7.2/92.7/9.08  O2 Sats:  -- PA: 7.1/39.3/3.79  Outputs:  NO PHASE  Outputs:  -- CALCUL[age]  Outputs:  -- CALCULATIONS: Body Surface Area: 1.42  Outputs:  -- CALCULATIONS: Height in cm: 137.00  Outputs:  -- CALCULATIONS: Sex: Female  Outputs:  -- CALCULATIONS: Weight in k.00  Outputs:  -- OUTPUTS: CO by Chapin: 3.36  Outputs:  -- OUTPUTS: Chapin cardiac index: 2.37  Outputs:  -- OUTPUTS: O2 consumption: 177.27  Outputs:  -- OUTPUTS: Vo2 Indexed: 125.00  Outputs:  -- RESISTANCES: Left ventricular stroke work: 23.39  Outputs:  -- RESISTANCES: Left Ventricular Stroke Work index: 16.49  Outputs:  -- RESISTANCES: Pulmonary vascular index (dsc): 473.18  Outputs:  -- RESISTANCES: Pulmonary vascular index (Wood Units): 5.92  Outputs:  -- RESISTANCES: Pulmonary vascular resistance (dsc): 333.65  Outputs:  -- RESISTANCES: Pulmonary vascular resistance (Wood Units): 4.17  Outputs:  -- RESISTANCES: PVR_SVR Ratio: 0.26  Outputs:  -- RESISTANCES: Right ventricular stroke work: 15.40  Outputs:  -- RESISTANCES: Right ventricular stroke work index: 10.86  Outputs:  -- RESISTANCES: Systemic vascular index (dsc): 1825.13  Outputs:  -- RESISTANCES: Systemic vascular index (Wood Units): 22.82  Outputs:  -- RESISTANCES: Systemic vascular resistance (dsc): 1286.94  Outputs:  -- RESISTANCES: Systemic vascular resistance (Wood Units): 16.09  Outputs:  -- RESISTANCES: Total pulmonary index (dsc): 1385.75  Outputs:  -- RESISTANCES: Total pulmonary index (Wood Units): 17.33  Outputs:  -- RESISTANCES: Total pulmonary resistance (dsc): 977.12  Outputs:  -- RESISTANCES: Total pulmonary resistance (Wood Units): 12.22  Outputs:  -- RESISTANCES: Total vascular index (Wood Units): 28.74  Outputs:  -- RESISTANCES: Total vascular resistance (dsc): 1620.59  Outputs:  -- RESISTANCES: Total vascular resistance (Wood Units): 20.26  Outputs:  -- RESISTANCES: Total vascular resistance index (dsc): 2298.32  Outputs:  -- RESISTANCES: TPR_TVR Ratio: 0.60  Outputs:  -- SHUNTS: Pulmonary flow: 3.36  Outputs:  -- SHUNTS: Qp Indexed: 2.37  Outputs:  -- SHUNTS: Qs Indexed: 2.37  Outputs:  -- SHUNTS: Systemic flow: 3.36    < end of copied text >      Interpretation of Telemetry:  sinus rhythm, HR 70s     Imaging:    Labs: Personally reviewed                        8.6    9.3   )-----------( 298      ( 2019 06:26 )             24.9     06-17    133<L>  |  100  |  46<H>  ----------------------------<  260<H>  4.8   |  18<L>  |  2.27<H>    Ca    9.3      2019 06:26    TPro  7.3  /  Alb  3.6  /  TBili  0.2  /  DBili  x   /  AST  14  /  ALT  20  /  AlkPhos  112  06-17

## 2019-06-17 NOTE — PROGRESS NOTE ADULT - PROBLEM SELECTOR PLAN 5
renal following  cr increased to 2.2 today  d/c diuretics at this time  strict I & O's   daily weight renal following  cr increased to 2.2 today  continue diuretics as per renal and chf team   daily weight and strict I & O's  d/c colchicine

## 2019-06-17 NOTE — PROGRESS NOTE ADULT - SUBJECTIVE AND OBJECTIVE BOX
VITAL SIGNS    Telemetry:    Vital Signs Last 24 Hrs  T(C): 37.1 (19 @ 04:39), Max: 37.7 (19 @ 20:07)  T(F): 98.8 (19 @ 04:39), Max: 99.9 (19 @ 20:07)  HR: 71 (19 @ 04:39) (71 - 107)  BP: 112/71 (19 @ 04:39) (95/58 - 112/71)  RR: 18 (19 @ 04:39) (18 - 18)  SpO2: 97% (19 @ 04:39) (97% - 99%)             @ 07:01  -   @ 07:00  --------------------------------------------------------  IN: 720 mL / OUT: 1500 mL / NET: -780 mL       Daily     Daily Weight in k (2019 08:22)  Admit Wt: Drug Dosing Weight  Height (cm): 149.86 (2019 08:35)  Weight (kg): 44.8 (2019 08:35)  BMI (kg/m2): 19.9 (2019 08:35)  BSA (m2): 1.37 (2019 08:35)    Bilirubin Total, Serum: 0.2 mg/dL ( @ 06:26)    CAPILLARY BLOOD GLUCOSE      POCT Blood Glucose.: 257 mg/dL (2019 08:06)  POCT Blood Glucose.: 182 mg/dL (2019 21:28)  POCT Blood Glucose.: 291 mg/dL (2019 16:27)  POCT Blood Glucose.: 154 mg/dL (2019 12:03)          acetaminophen   Tablet .. 650 milliGRAM(s) Oral every 6 hours PRN  aspirin enteric coated 81 milliGRAM(s) Oral daily  atorvastatin 40 milliGRAM(s) Oral at bedtime  buMETAnide 2 milliGRAM(s) Oral every 12 hours  colchicine 0.6 milliGRAM(s) Oral daily  dextrose 40% Gel 15 Gram(s) Oral once PRN  dextrose 5%. 1000 milliLiter(s) IV Continuous <Continuous>  dextrose 50% Injectable 12.5 Gram(s) IV Push once  dextrose 50% Injectable 25 Gram(s) IV Push once  dextrose 50% Injectable 25 Gram(s) IV Push once  docusate sodium 100 milliGRAM(s) Oral two times a day  glucagon  Injectable 1 milliGRAM(s) IntraMuscular once PRN  heparin  Injectable 5000 Unit(s) SubCutaneous every 12 hours  hydrALAZINE 20 milliGRAM(s) Oral three times a day  insulin glargine Injectable (LANTUS) 40 Unit(s) SubCutaneous at bedtime  insulin lispro (HumaLOG) corrective regimen sliding scale   SubCutaneous three times a day before meals  insulin lispro (HumaLOG) corrective regimen sliding scale   SubCutaneous at bedtime  insulin lispro Injectable (HumaLOG) 16 Unit(s) SubCutaneous three times a day before meals  isosorbide   dinitrate Tablet (ISORDIL) 10 milliGRAM(s) Oral three times a day  levothyroxine 50 MICROGram(s) Oral daily  montelukast 10 milliGRAM(s) Oral daily  pantoprazole    Tablet 40 milliGRAM(s) Oral before breakfast  senna 2 Tablet(s) Oral at bedtime  sodium chloride 0.9%. 1000 milliLiter(s) IV Continuous <Continuous>  ticagrelor 90 milliGRAM(s) Oral two times a day  trimethoprim   80 mG/sulfamethoxazole 400 mG 1 Tablet(s) Oral every 12 hours      PHYSICAL EXAM    Subjective: "Hi.   Neurology: alert and oriented x 3, nonfocal, no gross deficits  CV : tele:  RSR  Sternal Wound :  CDI with dressing , Stable  Lungs: clear. RR easy, unlabored   Abdomen: soft, nontender, nondistended, positive bowel sounds, bowel movement   Neg N/V/D   :  pt voiding without difficulty   Extremities:   CUADRA; edema, neg calf tenderness.   PPP bilaterally      PW:  Chest tubes: VITAL SIGNS    Telemetry:  RSR    Vital Signs Last 24 Hrs  T(C): 37.1 (19 @ 04:39), Max: 37.7 (19 @ 20:07)  T(F): 98.8 (19 @ 04:39), Max: 99.9 (19 @ 20:07)  HR: 71 (19 @ 04:39) (71 - 107)  BP: 112/71 (19 @ 04:39) (95/58 - 112/71)  RR: 18 (19 @ 04:39) (18 - 18)  SpO2: 97% (19 @ 04:39) (97% - 99%)             @ 07:01  -   @ 07:00  --------------------------------------------------------  IN: 720 mL / OUT: 1500 mL / NET: -780 mL       Daily     Daily Weight in k (2019 08:22)  Admit Wt: Drug Dosing Weight  Height (cm): 149.86 (2019 08:35)  Weight (kg): 44.8 (2019 08:35)  BMI (kg/m2): 19.9 (2019 08:35)  BSA (m2): 1.37 (2019 08:35)    Bilirubin Total, Serum: 0.2 mg/dL ( @ 06:26)    CAPILLARY BLOOD GLUCOSE      POCT Blood Glucose.: 257 mg/dL (2019 08:06)  POCT Blood Glucose.: 182 mg/dL (2019 21:28)  POCT Blood Glucose.: 291 mg/dL (2019 16:27)  POCT Blood Glucose.: 154 mg/dL (2019 12:03)          acetaminophen   Tablet .. 650 milliGRAM(s) Oral every 6 hours PRN  aspirin enteric coated 81 milliGRAM(s) Oral daily  atorvastatin 40 milliGRAM(s) Oral at bedtime  buMETAnide 2 milliGRAM(s) Oral every 12 hours  colchicine 0.6 milliGRAM(s) Oral daily  dextrose 40% Gel 15 Gram(s) Oral once PRN  dextrose 5%. 1000 milliLiter(s) IV Continuous <Continuous>  dextrose 50% Injectable 12.5 Gram(s) IV Push once  dextrose 50% Injectable 25 Gram(s) IV Push once  dextrose 50% Injectable 25 Gram(s) IV Push once  docusate sodium 100 milliGRAM(s) Oral two times a day  glucagon  Injectable 1 milliGRAM(s) IntraMuscular once PRN  heparin  Injectable 5000 Unit(s) SubCutaneous every 12 hours  hydrALAZINE 20 milliGRAM(s) Oral three times a day  insulin glargine Injectable (LANTUS) 40 Unit(s) SubCutaneous at bedtime  insulin lispro (HumaLOG) corrective regimen sliding scale   SubCutaneous three times a day before meals  insulin lispro (HumaLOG) corrective regimen sliding scale   SubCutaneous at bedtime  insulin lispro Injectable (HumaLOG) 16 Unit(s) SubCutaneous three times a day before meals  isosorbide   dinitrate Tablet (ISORDIL) 10 milliGRAM(s) Oral three times a day  levothyroxine 50 MICROGram(s) Oral daily  montelukast 10 milliGRAM(s) Oral daily  pantoprazole    Tablet 40 milliGRAM(s) Oral before breakfast  senna 2 Tablet(s) Oral at bedtime  sodium chloride 0.9%. 1000 milliLiter(s) IV Continuous <Continuous>  ticagrelor 90 milliGRAM(s) Oral two times a day  trimethoprim   80 mG/sulfamethoxazole 400 mG 1 Tablet(s) Oral every 12 hours      PHYSICAL EXAM    Subjective: "I don't feel good."   Neurology: alert and oriented x 3, nonfocal, no gross deficits  CV : tele:  RSR    Lungs: clear RR easy, unlabored   Abdomen: soft, nontender, nondistended, positive bowel sounds, +bowel movement   Neg N/V/D   :  pt voiding without difficulty; + external female catheter   Extremities:   CUADRA; neg LE edema, neg calf tenderness.   PPP bilaterally; right groin cdi coco- neg hematoma

## 2019-06-17 NOTE — PROGRESS NOTE ADULT - PROBLEM SELECTOR PLAN 3
comfort measures provided  avoid xanax or narcotics   pt needs encouragement - for ADL  cont therapeutic care comfort measures provided  pt needs encouragement - for ADL  cont therapeutic care

## 2019-06-17 NOTE — CONSULT NOTE ADULT - PROBLEM SELECTOR PROBLEM 1
Severe mitral regurgitation
Type 2 diabetes mellitus without complication, unspecified whether long term insulin use
Acute on chronic systolic heart failure

## 2019-06-17 NOTE — CONSULT NOTE ADULT - ASSESSMENT
74 y/o female with PMHx of HTN, DM, CAD s/p CABG (LIMA to LAD, SVG to OM, SVG to PDA at [facility] 2014), HFrEF (LVEF 25%) severe MR, severe pulm HTN, hypothyroidism, admitted to [facility] for SOB, now transferred to [facility] for Alina Clip Eval    MERVIN  Scr 2.4 today  Likely sec to CHF/renal vasocongestion   Renal function fluctuates slightly likely sec to CHF.   Monitor bmp  Diuretics per cardio  IF renal function worsens, would hold off Spirinolactone   Avoid nephrotoxic agents      CKD stage 3  baseline cr 1.5-1.8  likely sec to HTN/DM/chf  MOnitor renal function     SOB  likely sec to HF   Monitor daily weight and i/o  Diuretics per cardio  Patient with severe MR await Mitral Clip eval - Follow up CTS
Ms Gamino is a 73 year old female with CAD (s/p CABGx3 in 2014), insulin controlled DM, CKD, HTN, with multiple recent admissions to [facility] for heart failure.  LV revealed severe MR with leaflet tethering and subsequent TTE showed LVEF of 30%.  She was scheduled for Structural Heart clinic but was admitted to [facility] in ADHF.  She is now transferred to [facility] for evaluation for Mitraclip.      STS risk for MVR: 17%, MVrepair 13.4%
patient seen and chart reviewd, full note to follow.  Assessment  DMT2: 73y Female with DM T2 with hyperglycemia, on basal bolus insulin, blood sugars running high, no hypoglycemic episode,  eating meals,  non compliant with low carb diet.  MVR/CAD: on medications, no chest pain, stable, planing surgery, monitored.  Hypothyroidism: On synthroid 50 mcg daily.  HTN: Controlled,  on antihypertensive medications.  CKD: Monitor labs/BMP,   Obesity: No strict exercise routines, not on any weight loss program, neither on low calorie diet.          Jillian Banks MD  Cell: [phone]  Office: [phone]
74 yo woman w/ hx of HTN, T2DM, CAD s/p CABG 2014 (LIMA to LAD, SVG to OM, SVG to PDA at [facility] 2014),  non-dilated ICM (EF 20-25%, normal), severe MR s/p mitral clip 6/13, hypothyroidism who presented to [facility] in acute on chronic systolic heart failure.  Diuresed w/ IV diuretic infusion and placed on iontropes initially.  Weaned off ionotropes successfully on 6/1 w/ improvement in clinical status.  Transferred to [facility] on 6/11 for mitral clip evaluation.  Underwent mitral clip procedure on 6/13 w/o issues/complications.  Re-started back on IV diuretics and switched on PO on 6/16.      Despite this, remains hypervolemic w/ elevated filling pressures on exam.

## 2019-06-17 NOTE — PROGRESS NOTE ADULT - SUBJECTIVE AND OBJECTIVE BOX
Dr. Muhammad  Office [phone]  Cell [phone]  [facility] FIELD  Cell [phone]      Patient is a 73y old  Female who presents with a chief complaint of ADHF, eval for mitral clip (17 Jun 2019 15:00)      Patient seen and examined at bedside. No chest pain/sob    VITALS:  T(F): 98.5 (06-17-19 @ 14:05), Max: 99.9 (06-16-19 @ 20:07)  HR: 96 (06-17-19 @ 14:05)  BP: 94/55 (06-17-19 @ 14:05)  RR: 18 (06-17-19 @ 14:05)  SpO2: 99% (06-17-19 @ 14:05)  Wt(kg): --    06-16 @ 07:01  -  06-17 @ 07:00  --------------------------------------------------------  IN: 720 mL / OUT: 1500 mL / NET: -780 mL    06-17 @ 07:01  -  06-17 @ 16:02  --------------------------------------------------------  IN: 120 mL / OUT: 0 mL / NET: 120 mL          PHYSICAL EXAM:  Constitutional: NAD  Neck: No JVD  Respiratory: CTAB, no wheezes, rales or rhonchi  Cardiovascular: S1, S2, RRR  Gastrointestinal: BS+, soft, NT/ND  Extremities: No peripheral edema    Hospital Medications:   MEDICATIONS  (STANDING):  aspirin enteric coated 81 milliGRAM(s) Oral daily  atorvastatin 40 milliGRAM(s) Oral at bedtime  buMETAnide Injectable 2 milliGRAM(s) IV Push once  colchicine 0.6 milliGRAM(s) Oral daily  dextrose 5%. 1000 milliLiter(s) (50 mL/Hr) IV Continuous <Continuous>  dextrose 50% Injectable 12.5 Gram(s) IV Push once  dextrose 50% Injectable 25 Gram(s) IV Push once  dextrose 50% Injectable 25 Gram(s) IV Push once  docusate sodium 100 milliGRAM(s) Oral two times a day  heparin  Injectable 5000 Unit(s) SubCutaneous every 12 hours  hydrALAZINE 25 milliGRAM(s) Oral every 8 hours  insulin glargine Injectable (LANTUS) 48 Unit(s) SubCutaneous at bedtime  insulin lispro (HumaLOG) corrective regimen sliding scale   SubCutaneous three times a day before meals  insulin lispro (HumaLOG) corrective regimen sliding scale   SubCutaneous at bedtime  insulin lispro Injectable (HumaLOG) 22 Unit(s) SubCutaneous three times a day before meals  isosorbide   dinitrate Tablet (ISORDIL) 10 milliGRAM(s) Oral three times a day  levothyroxine 50 MICROGram(s) Oral daily  montelukast 10 milliGRAM(s) Oral daily  pantoprazole    Tablet 40 milliGRAM(s) Oral before breakfast  senna 2 Tablet(s) Oral at bedtime  sodium chloride 0.9%. 1000 milliLiter(s) (10 mL/Hr) IV Continuous <Continuous>  ticagrelor 90 milliGRAM(s) Oral two times a day  trimethoprim   80 mG/sulfamethoxazole 400 mG 1 Tablet(s) Oral every 12 hours      LABS:  06-17    133<L>  |  100  |  46<H>  ----------------------------<  260<H>  4.8   |  18<L>  |  2.27<H>    Ca    9.3      17 Jun 2019 06:26    TPro  7.3  /  Alb  3.6  /  TBili  0.2  /  DBili      /  AST  14  /  ALT  20  /  AlkPhos  112  06-17    Creatinine Trend: 2.27 <--, 2.03 <--, 1.83 <--, 2.18 <--, 2.30 <--, 2.37 <--, 2.26 <--, 2.28 <--    Albumin, Serum: 3.6 g/dL (06-17 @ 06:26)                              8.6    9.3   )-----------( 298      ( 17 Jun 2019 06:26 )             24.9     Urine Studies:  Urinalysis - [06-14-19 @ 16:33]      Color Light Yellow / Appearance Clear / SG 1.016 / pH 5.5      Gluc 500 mg/dL / Ketone Negative  / Bili Negative / Urobili Negative       Blood Trace / Protein 30 mg/dL / Leuk Est Small / Nitrite Negative      RBC 10 / WBC 10 / Hyaline 2 / Gran  / Sq Epi  / Non Sq Epi 1 / Bacteria Negative      .4 (Ca --)      [04-25-19 @ 05:45]   --  PTH 43.55 (Ca --)      [09-10-18 @ 07:15]   --  PTH 36.60 (Ca --)      [09-08-18 @ 03:15]   --  Vitamin D (25OH) 25.9      [05-01-19 @ 04:00]  HbA1c 7.8      [06-01-19 @ 08:00]  TSH 0.75      [06-01-19 @ 08:00]  Lipid: chol 148, , HDL 35,       [06-01-19 @ 08:00]        RADIOLOGY & ADDITIONAL STUDIES:

## 2019-06-17 NOTE — PROGRESS NOTE ADULT - ASSESSMENT
73 F PMH HTN, T2DM, CAD s/p CABG (LIMA to LAD, SVG to OM, SVG to PDA at [facility] 2014), HFrEF (LVEF 25%) severe MR, severe pulm HTN, hypothyroidism, [facility] on 6/1/19 with sob, increased work of breathing, f/w ADHF and severe MR, cardiorenal syndrome with duncan on ckd, now s/p IV diuresis and transferred over for eval for mitral clip procedure in the setting of recurrent readmissions for ADHF exacerbation.      6/13 s/p mitral clip, post op requiring vasopressors for hypotension  6/14 VSS, s/p 1 PRBC, transferred to [facility]. Tolerating current medical regiment. Cont diuretics.    6/15 VSS; continue diuresis; xanax initiated for anxiety; ck postop Echo; continue bactrim for a UTI; endo consulted today for uncontrolled dm  6/16 VSS: Bumex changed to PO 2 mg BID, aldactone d/c'd. SCr 2 today, renal recs appreciated. Heart failure consulted for medical optimization. Colchicine started for Right foot gout. D/c planning to rehab tomorrow.   Discharge planning- rehab [facility] 73 F PMH HTN, T2DM, CAD s/p CABG (LIMA to LAD, SVG to OM, SVG to PDA at [facility] 2014), HFrEF (LVEF 25%) severe MR, severe pulm HTN, hypothyroidism, [facility] on 6/1/19 with sob, increased work of breathing, f/w ADHF and severe MR, cardiorenal syndrome with duncan on ckd, now s/p IV diuresis and transferred over for eval for mitral clip procedure in the setting of recurrent readmissions for ADHF exacerbation.      6/13 s/p mitral clip, post op requiring vasopressors for hypotension  6/14 VSS, s/p 1 PRBC, transferred to [facility]. Tolerating current medical regiment. Cont diuretics.    6/15 VSS; continue diuresis; xanax initiated for anxiety; ck postop Echo; continue bactrim for a UTI; endo consulted today for uncontrolled dm  6/16 VSS: Bumex changed to PO 2 mg BID, aldactone d/c'd. SCr 2 today, renal recs appreciated. Heart failure consulted for medical optimization. Colchicine started for Right foot gout.  6/17 VSS: cr 2.2;  diuretics currently d/c; CHF consult; ck pa/ lateral chest xray; encourage ambulation; o2 sats  94% RA   Discharge planning- rehab mon - [facility] tue/wed if cr stable 73 F PMH HTN, T2DM, CAD s/p CABG (LIMA to LAD, SVG to OM, SVG to PDA at [facility] 2014), HFrEF (LVEF 25%) severe MR, severe pulm HTN, hypothyroidism, [facility] on [facility]1/19 with sob, increased work of breathing, f/w ADHF and severe MR, cardiorenal syndrome with duncan on ckd, now s/p IV diuresis and transferred over for eval for mitral clip procedure in the setting of recurrent readmissions for ADHF exacerbation.      6/13 s/p mitral clip, post op requiring vasopressors for hypotension  6/14 VSS, s/p 1 PRBC, transferred to [facility]. Tolerating current medical regiment. Cont diuretics.    6/15 VSS; continue diuresis; xanax initiated for anxiety; ck postop Echo; continue bactrim for a UTI; endo consulted today for uncontrolled dm  6/16 VSS: Bumex changed to PO 2 mg BID, aldactone d/c'd. SCr 2 today, renal recs appreciated. Heart failure consulted for medical optimization. Colchicine started for Right foot gout.  6/17 VSS: cr 2.2;  continue diuretics as per CHF and renal; increase hydralazine 25 mg po tid;  ck pa/ lateral chest xray; encourage ambulation; o2 sats  94% RA   Discharge planning- rehab [facility] tue/wed if cr stable

## 2019-06-17 NOTE — CONSULT NOTE ADULT - PROBLEM SELECTOR RECOMMENDATION 2
- Scr 2.27; has been 1.9-2.4 for the past week  - diurese as above   - avoid nephrotoxic agents
On medications,  no chest pain, stable, monitored and followed up by cardiothoracic team/cardiology team

## 2019-06-17 NOTE — PROGRESS NOTE ADULT - PROBLEM SELECTOR PLAN 1
continue postop care  continue diuresis - Bumex changed to PO , aldactone d/c'd 6/16, SCr 2. Con't to monitor, appreciate renal recs  TTE 6/15 - minimal MR - severe LV dysfunction   continue asa, statin, brillinta/ hydralazine 20 mg po q8   Heart failure consulted for optimization   pulmonary toileting, incentive spirometer  increase activity as tolerated, OOB and ambulate per PT   pain control   colchicine started for R foot gout.   Discharge planning- rehab monday continue postop care  stop diuretics as per CHF/ cr increased to 2.2 today   TTE 6/15 - minimal MR - severe LV dysfunction   continue asa, statin, brillinta/ hydralazine 20 mg po q8   pulmonary toileting, incentive spirometer  increase activity as tolerated, OOB and ambulate per PT   ck pa/lateral chest xray   colchicine for right foot gout   Discharge planning- rehab tue/wed if cr stable continue postop care  Continue diuretics as per CHF/ cr increased to 2.2 today   TTE 6/15 - minimal MR - severe LV dysfunction   continue asa, statin, brillinta/ increase hydralazine 25 mg po q8   pulmonary toileting, incentive spirometer  increase activity as tolerated, OOB and ambulate per PT   ck pa/lateral chest xray   Discharge planning- rehab tue/wed if cr stable

## 2019-06-17 NOTE — PROGRESS NOTE ADULT - PROBLEM SELECTOR PLAN 4
continue bactrim  daily cbc  monitor vs continue bactrim for 5 days total   daily cbc  monitor vital signs

## 2019-06-17 NOTE — PROGRESS NOTE ADULT - PROBLEM SELECTOR PLAN 2
keshawn consulted for Dr. Banks   diabetic diet  insulin sliding scale  HS lantus  glycemic control keshawn following for Dr. Banks   diabetic diet  insulin sliding scale  continue lantus and humalog   glycemic control

## 2019-06-17 NOTE — CONSULT NOTE ADULT - REASON FOR ADMISSION
ADHF, eval for mitral clip

## 2019-06-17 NOTE — CONSULT NOTE ADULT - PROBLEM SELECTOR PROBLEM 2
Acute renal failure superimposed on stage 3 chronic kidney disease, unspecified acute renal failure type
Coronary artery disease involving native heart without angina pectoris, unspecified vessel or lesion type

## 2019-06-17 NOTE — CONSULT NOTE ADULT - ATTENDING COMMENTS
Patient is s/p MitraClip with an excellent result and is on low dose HF therapies with underlying CKD. We are asked to comment on her diuretic dosing. I discussed her care with the primary team as well as her nephrologist.    She appears fluid overloaded today and her renal function is essentially close to her baseline. Would give bumex 2 mg IV x1 now and then start her on 4 mg po BID tomorrow. Increase hydralazine to 25 mg TID so that she is on just 1 pill (rather than 2x 10 mg tabs) and continue the ISDN. Agree with holding spironolactone given CKD, but she may require KCl supplementation.    Would get a sense of her urine output on the oral bumex prior to discharge so that we know how she will manage. Suggest delaying dispo until Wed for that purpose.

## 2019-06-17 NOTE — CONSULT NOTE ADULT - SUBJECTIVE AND OBJECTIVE BOX
HPI:  73 F PMH HTN, T2DM, CAD s/p CABG (LIMA to LAD, SVG to OM, SVG to PDA at [facility] 2014), HFrEF (LVEF 25%) severe MR, severe pulm HTN, hypothyroidism, [facility] on [facility]1/19 with sob, increased wob. Pt had a prior admission in 5/2019 for ADHF and cp s/p LHC showing TVD medically managed. On this current admission, pt was f/w CXR showing pulm edema, probnp >4k, and dyspneic, thus admitted to CCU and started on milrinone gtt and bumex gtt for ADHF. Continued on bipap. Pt had repeat TTE which showed severe MR.  Pt developed acute on chronic kidney injury presumed 2/2 IV/gtt diuresis; milrinone was d/c 6/1/19, and bumex gtt d/c 6/4/19, with renal following for cardiorenal syn/hemodynamically mediated duncan.  Pt currently on po bumex bid, aldactone qd, for maintenance diuresis and PRN bumex IV pushes. Pt was transferred to [facility] for eval for mitral clip procedure.  Currently, pt endorses mild dyspnea, +frequent urination, +weakness. Denies cp, f/c, n/v/d, dysuria, cough.     Vs: 98.2, 85, 107/68, 18, 99%. (05 Jun 2019 21:44)  Patient has history of diabetes, on insulin at home, no recent hypoglycemic episodes, no polyuria polydipsia. Patient follows up with PCP for diabetes management.    PAST MEDICAL & SURGICAL HISTORY:  GERD (gastroesophageal reflux disease)  CAD (coronary artery disease): s/p CABG  Hypothyroidism  Hyperlipidemia  Diabetes mellitus, type 2  S/P CABG (coronary artery bypass graft)      FAMILY HISTORY:  Family history of hypertension (Sibling): sister  Family history of diabetes mellitus (Sibling): brothers/sisters      Social History:    Outpatient Medications:    MEDICATIONS  (STANDING):  aspirin enteric coated 81 milliGRAM(s) Oral daily  atorvastatin 40 milliGRAM(s) Oral at bedtime  colchicine 0.6 milliGRAM(s) Oral daily  dextrose 5%. 1000 milliLiter(s) (50 mL/Hr) IV Continuous <Continuous>  dextrose 50% Injectable 12.5 Gram(s) IV Push once  dextrose 50% Injectable 25 Gram(s) IV Push once  dextrose 50% Injectable 25 Gram(s) IV Push once  docusate sodium 100 milliGRAM(s) Oral two times a day  heparin  Injectable 5000 Unit(s) SubCutaneous every 12 hours  hydrALAZINE 20 milliGRAM(s) Oral three times a day  insulin glargine Injectable (LANTUS) 48 Unit(s) SubCutaneous at bedtime  insulin lispro (HumaLOG) corrective regimen sliding scale   SubCutaneous three times a day before meals  insulin lispro (HumaLOG) corrective regimen sliding scale   SubCutaneous at bedtime  insulin lispro Injectable (HumaLOG) 22 Unit(s) SubCutaneous three times a day before meals  isosorbide   dinitrate Tablet (ISORDIL) 10 milliGRAM(s) Oral three times a day  levothyroxine 50 MICROGram(s) Oral daily  montelukast 10 milliGRAM(s) Oral daily  pantoprazole    Tablet 40 milliGRAM(s) Oral before breakfast  senna 2 Tablet(s) Oral at bedtime  sodium chloride 0.9%. 1000 milliLiter(s) (10 mL/Hr) IV Continuous <Continuous>  ticagrelor 90 milliGRAM(s) Oral two times a day  trimethoprim   80 mG/sulfamethoxazole 400 mG 1 Tablet(s) Oral every 12 hours    MEDICATIONS  (PRN):  acetaminophen   Tablet .. 650 milliGRAM(s) Oral every 6 hours PRN Temp greater or equal to 38C (100.4F)  dextrose 40% Gel 15 Gram(s) Oral once PRN Blood Glucose LESS THAN 70 milliGRAM(s)/deciliter  glucagon  Injectable 1 milliGRAM(s) IntraMuscular once PRN Glucose LESS THAN 70 milligrams/deciliter      Allergies    azithromycin (Hives; Pruritus)    Intolerances      Review of Systems:  Constitutional: No fever, no chills  Eyes: No blurry vision  Neuro: No tremors  HEENT: No pain, no neck swelling  Cardiovascular: No chest pain, no palpitations  Respiratory: Has SOB, no cough  GI: No nausea, vomiting, abdominal pain  : No dysuria  Skin: no rash  MSK: Has leg swelling.  Psych: no depression  Endocrine: no polyuria, polydipsia    ALL OTHER SYSTEMS REVIEWED AND NEGATIVE    UNABLE TO OBTAIN    PHYSICAL EXAM:  VITALS: T(C): 37.1 (06-17-19 @ 04:39)  T(F): 98.8 (06-17-19 @ 04:39), Max: 99.9 (06-16-19 @ 20:07)  HR: 71 (06-17-19 @ 04:39) (71 - 103)  BP: 112/71 (06-17-19 @ 04:39) (112/68 - 112/71)  RR:  (18 - 18)  SpO2:  (97% - 99%)  Wt(kg): --  GENERAL: NAD, well-groomed, well-developed  EYES: No proptosis, no lid lag  HEENT:  Atraumatic, Normocephalic  THYROID: Normal size, no palpable nodules  RESPIRATORY: Clear to auscultation bilaterally; No rales, rhonchi, wheezing  CARDIOVASCULAR: Si S2, No murmurs;  GI: Soft, non distended, normal bowel sounds  SKIN: Dry, intact, No rashes or lesions  MUSCULOSKELETAL: Has BL lower extremity edema.  NEURO:  no tremor, sensation decreased in feet BL,    POCT Blood Glucose.: 242 mg/dL (06-17-19 @ 11:50)  POCT Blood Glucose.: 257 mg/dL (06-17-19 @ 08:06)  POCT Blood Glucose.: 182 mg/dL (06-16-19 @ 21:28)  POCT Blood Glucose.: 291 mg/dL (06-16-19 @ 16:27)  POCT Blood Glucose.: 154 mg/dL (06-16-19 @ 12:03)  POCT Blood Glucose.: 221 mg/dL (06-16-19 @ 07:43)  POCT Blood Glucose.: 171 mg/dL (06-16-19 @ 03:57)  POCT Blood Glucose.: 107 mg/dL (06-15-19 @ 21:58)  POCT Blood Glucose.: 182 mg/dL (06-15-19 @ 16:33)  POCT Blood Glucose.: 125 mg/dL (06-15-19 @ 11:44)  POCT Blood Glucose.: 299 mg/dL (06-15-19 @ 07:48)  POCT Blood Glucose.: 241 mg/dL (06-14-19 @ 21:41)  POCT Blood Glucose.: 165 mg/dL (06-14-19 @ 19:16)  POCT Blood Glucose.: 217 mg/dL (06-14-19 @ 16:45)                            8.6    9.3   )-----------( 298      ( 17 Jun 2019 06:26 )             24.9       06-17    133<L>  |  100  |  46<H>  ----------------------------<  260<H>  4.8   |  18<L>  |  2.27<H>    EGFR if : 24<L>  EGFR if non : 21<L>    Ca    9.3      06-17    TPro  7.3  /  Alb  3.6  /  TBili  0.2  /  DBili  x   /  AST  14  /  ALT  20  /  AlkPhos  112  06-17      Thyroid Function Tests:  06-12 @ 15:17 TSH -- FreeT4 -- T3 63 Anti TPO -- Anti Thyroglobulin Ab -- TSI --  06-01 @ 08:00 TSH 0.75 FreeT4 -- T3 -- Anti TPO -- Anti Thyroglobulin Ab -- TSI --      Hemoglobin A1C, Whole Blood: 7.8 % <H> [4.0 - 5.6] (06-01-19 @ 08:00)  Hemoglobin A1C, Whole Blood: 7.3 % <H> [4.0 - 5.6] (04-25-19 @ 05:45)  Hemoglobin A1C, Whole Blood: 7.2 % <H> [4.0 - 5.6] (04-24-19 @ 13:50)      06-01 Chol 148  HDL 35<L> Trig 117    Radiology:

## 2019-06-17 NOTE — CONSULT NOTE ADULT - PROBLEM SELECTOR RECOMMENDATION 9
ACC/AHA stage C, class III symptoms     - give IV bumex 2 mg x 1 dose today   - re-start PO bumex 4 mg BID tomorrow   - increase hydralazine to 25 mg TID   - cont isordil 10 mg TID   - cont holding aldactone
- Mitraclip evaluation in progress  - LV and cath done  - LV uploaded to [employer], anatomy is suitable for clipping  - we have tentatively scheduled her for Mitraclip procedure on 6/13  -- until then, her heart failure and CKD will need to be optimized  - appreciate Renal input  - HF team to evaluate as well  - carotids ordered and done, PFT's ordered
Will increase Lantus to 48 units at bed time.  Will increase Humalog to 22  units before each meal in addition to Humalog correction scale coverage.  Patient counseled for compliance with consistent low carb diet.

## 2019-06-17 NOTE — PROGRESS NOTE ADULT - ASSESSMENT
72 y/o female with PMHx of HTN, DM, CAD s/p CABG (LIMA to LAD, SVG to OM, SVG to PDA at [facility] 2014), HFrEF (LVEF 25%) severe MR, severe pulm HTN, hypothyroidism, admitted to [facility] for SOB, now transferred to [facility] for Alina Clip Eval    MERVIN  Likely sec to CHF/renal vasocongestion   Renal function fluctuates slightly likely sec to CHF. -   Monitor bmp  Continue Diuretics per cardio  off Spirinolactone   Avoid nephrotoxic agents      CKD stage 3  baseline cr 1.5-1.8  likely sec to HTN/DM/chf  Monitor renal function     SOB  likely sec to HF   Monitor daily weight and i/o  Diuretics per cardio  Patient with severe MR, s/p  Mitral Clip on 6/13

## 2019-06-17 NOTE — CONSULT NOTE ADULT - PROBLEM SELECTOR RECOMMENDATION 3
- s/p mitral clip  - TTE on 6/15 w/ minimal MR   - cont to treat as above
Will get thyroid panel continue Synthroid 50 mcg Po daily for now, FU

## 2019-06-18 ENCOUNTER — TRANSCRIPTION ENCOUNTER (OUTPATIENT)
Age: 74
End: 2019-06-18

## 2019-06-18 LAB
ANION GAP SERPL CALC-SCNC: 15 MMOL/L — SIGNIFICANT CHANGE UP (ref 5–17)
BUN SERPL-MCNC: 54 MG/DL — HIGH (ref 7–23)
CALCIUM SERPL-MCNC: 9.4 MG/DL — SIGNIFICANT CHANGE UP (ref 8.4–10.5)
CHLORIDE SERPL-SCNC: 100 MMOL/L — SIGNIFICANT CHANGE UP (ref 96–108)
CO2 SERPL-SCNC: 18 MMOL/L — LOW (ref 22–31)
CREAT SERPL-MCNC: 2.52 MG/DL — HIGH (ref 0.5–1.3)
GLUCOSE BLDC GLUCOMTR-MCNC: 125 MG/DL — HIGH (ref 70–99)
GLUCOSE BLDC GLUCOMTR-MCNC: 130 MG/DL — HIGH (ref 70–99)
GLUCOSE BLDC GLUCOMTR-MCNC: 130 MG/DL — HIGH (ref 70–99)
GLUCOSE BLDC GLUCOMTR-MCNC: 136 MG/DL — HIGH (ref 70–99)
GLUCOSE BLDC GLUCOMTR-MCNC: 220 MG/DL — HIGH (ref 70–99)
GLUCOSE SERPL-MCNC: 257 MG/DL — HIGH (ref 70–99)
HCT VFR BLD CALC: 24.6 % — LOW (ref 34.5–45)
HGB BLD-MCNC: 8.3 G/DL — LOW (ref 11.5–15.5)
MCHC RBC-ENTMCNC: 29 PG — SIGNIFICANT CHANGE UP (ref 27–34)
MCHC RBC-ENTMCNC: 33.6 GM/DL — SIGNIFICANT CHANGE UP (ref 32–36)
MCV RBC AUTO: 86.3 FL — SIGNIFICANT CHANGE UP (ref 80–100)
PLATELET # BLD AUTO: 310 K/UL — SIGNIFICANT CHANGE UP (ref 150–400)
POTASSIUM SERPL-MCNC: 4.9 MMOL/L — SIGNIFICANT CHANGE UP (ref 3.5–5.3)
POTASSIUM SERPL-SCNC: 4.9 MMOL/L — SIGNIFICANT CHANGE UP (ref 3.5–5.3)
RBC # BLD: 2.85 M/UL — LOW (ref 3.8–5.2)
RBC # FLD: 15.8 % — HIGH (ref 10.3–14.5)
SODIUM SERPL-SCNC: 133 MMOL/L — LOW (ref 135–145)
WBC # BLD: 8.4 K/UL — SIGNIFICANT CHANGE UP (ref 3.8–10.5)
WBC # FLD AUTO: 8.4 K/UL — SIGNIFICANT CHANGE UP (ref 3.8–10.5)

## 2019-06-18 PROCEDURE — 99232 SBSQ HOSP IP/OBS MODERATE 35: CPT

## 2019-06-18 PROCEDURE — 99233 SBSQ HOSP IP/OBS HIGH 50: CPT | Mod: GC

## 2019-06-18 RX ORDER — BUMETANIDE 0.25 MG/ML
2 INJECTION INTRAMUSCULAR; INTRAVENOUS EVERY 12 HOURS
Refills: 0 | Status: DISCONTINUED | OUTPATIENT
Start: 2019-06-19 | End: 2019-06-19

## 2019-06-18 RX ORDER — HYDRALAZINE HCL 50 MG
37.5 TABLET ORAL EVERY 8 HOURS
Refills: 0 | Status: DISCONTINUED | OUTPATIENT
Start: 2019-06-18 | End: 2019-06-19

## 2019-06-18 RX ADMIN — Medication 37.5 MILLIGRAM(S): at 14:50

## 2019-06-18 RX ADMIN — Medication 81 MILLIGRAM(S): at 12:16

## 2019-06-18 RX ADMIN — BUMETANIDE 4 MILLIGRAM(S): 0.25 INJECTION INTRAMUSCULAR; INTRAVENOUS at 05:33

## 2019-06-18 RX ADMIN — BUMETANIDE 2 MILLIGRAM(S): 0.25 INJECTION INTRAMUSCULAR; INTRAVENOUS at 16:22

## 2019-06-18 RX ADMIN — TICAGRELOR 90 MILLIGRAM(S): 90 TABLET ORAL at 05:34

## 2019-06-18 RX ADMIN — PANTOPRAZOLE SODIUM 40 MILLIGRAM(S): 20 TABLET, DELAYED RELEASE ORAL at 05:34

## 2019-06-18 RX ADMIN — ISOSORBIDE DINITRATE 10 MILLIGRAM(S): 5 TABLET ORAL at 21:52

## 2019-06-18 RX ADMIN — Medication 22 UNIT(S): at 17:16

## 2019-06-18 RX ADMIN — Medication 650 MILLIGRAM(S): at 16:46

## 2019-06-18 RX ADMIN — Medication 22 UNIT(S): at 07:57

## 2019-06-18 RX ADMIN — Medication 650 MILLIGRAM(S): at 16:16

## 2019-06-18 RX ADMIN — Medication 22 UNIT(S): at 12:16

## 2019-06-18 RX ADMIN — Medication 1 TABLET(S): at 05:34

## 2019-06-18 RX ADMIN — HEPARIN SODIUM 5000 UNIT(S): 5000 INJECTION INTRAVENOUS; SUBCUTANEOUS at 05:33

## 2019-06-18 RX ADMIN — Medication 4: at 07:57

## 2019-06-18 RX ADMIN — Medication 100 MILLIGRAM(S): at 05:33

## 2019-06-18 RX ADMIN — INSULIN GLARGINE 48 UNIT(S): 100 INJECTION, SOLUTION SUBCUTANEOUS at 21:52

## 2019-06-18 RX ADMIN — TICAGRELOR 90 MILLIGRAM(S): 90 TABLET ORAL at 17:15

## 2019-06-18 RX ADMIN — ISOSORBIDE DINITRATE 10 MILLIGRAM(S): 5 TABLET ORAL at 05:34

## 2019-06-18 RX ADMIN — Medication 25 MILLIGRAM(S): at 05:33

## 2019-06-18 RX ADMIN — HEPARIN SODIUM 5000 UNIT(S): 5000 INJECTION INTRAVENOUS; SUBCUTANEOUS at 17:15

## 2019-06-18 RX ADMIN — MONTELUKAST 10 MILLIGRAM(S): 4 TABLET, CHEWABLE ORAL at 21:52

## 2019-06-18 RX ADMIN — Medication 1 TABLET(S): at 17:16

## 2019-06-18 RX ADMIN — ATORVASTATIN CALCIUM 40 MILLIGRAM(S): 80 TABLET, FILM COATED ORAL at 21:52

## 2019-06-18 RX ADMIN — Medication 50 MICROGRAM(S): at 05:33

## 2019-06-18 RX ADMIN — ISOSORBIDE DINITRATE 10 MILLIGRAM(S): 5 TABLET ORAL at 14:50

## 2019-06-18 RX ADMIN — Medication 37.5 MILLIGRAM(S): at 21:52

## 2019-06-18 NOTE — PROGRESS NOTE ADULT - ATTENDING COMMENTS
JVP is less visible when sitting in chair. Has made ~1L urine to the bumex 4 mg po dose from this morning.  Per renal, keep to daily dosing for now, but would increase to BID if she gains weight.    Increase hydralazine to 37.5 mg TID for next dose (due 2 pm).   If SBP > 90 at 10 pm, increase ISDN to 20 mg TID.  OK for discharge from my perspective and should have follow-up arranged in HF clinic at [facility] or [facility] pending patient preference.

## 2019-06-18 NOTE — DISCHARGE NOTE PROVIDER - NSDCACTIVITY_GEN_ALL_CORE
Walking - Outdoors allowed/Stairs allowed/Walking - Indoors allowed/No heavy lifting/straining/Showering allowed No heavy lifting/straining/Showering allowed/Do not make important decisions/Walking - Indoors allowed/Walking - Outdoors allowed/Stairs allowed/Sex allowed/Do not drive or operate machinery

## 2019-06-18 NOTE — PROGRESS NOTE ADULT - ASSESSMENT
74 y/o female with PMHx of HTN, DM, CAD s/p CABG (LIMA to LAD, SVG to OM, SVG to PDA at [facility] 2014), HFrEF (LVEF 25%) severe MR, severe pulm HTN, hypothyroidism, admitted to [facility] for SOB, now transferred to [facility] for Alina Clip Eval    MERVIN  Likely sec to CHF/renal vasocongestion   Renal function fluctuates slightly likely sec to CHF. -   Renal function worsening sec to diuretics-- hold diuretics today  recommend bumx 2mg bid po from tomorrow  Monitor bmp  Continue Diuretics per cardio  off Spirinolactone   Avoid nephrotoxic agents      CKD stage 3  baseline cr 1.5-1.8  likely sec to HTN/DM/chf  Monitor renal function     SOB  likely sec to HF   Monitor daily weight and i/o  Diuretics per cardio  Patient with severe MR, s/p  Mitral Clip on 6/13

## 2019-06-18 NOTE — PROGRESS NOTE ADULT - ASSESSMENT
74 yo woman w/ hx of HTN, T2DM, CAD s/p CABG 2014 (LIMA to LAD, SVG to OM, SVG to PDA at [facility] 2014),  non-dilated ICM (EF 20-25%, normal), severe MR s/p mitral clip 6/13, hypothyroidism who presented to [facility] in acute on chronic systolic heart failure.  Diuresed w/ IV diuretic infusion and placed on iontropes initially.  Weaned off ionotropes successfully on 6/1 w/ improvement in clinical status.  Transferred to [facility] on 6/11 for mitral clip evaluation.  Underwent mitral clip procedure on 6/13 w/o issues/complications.  Re-started back on IV diuretics and switched on PO on 6/16.      Despite this, remains hypervolemic w/ elevated filling pressures on exam. 72 yo woman w/ hx of HTN, T2DM, CAD s/p CABG 2014 (LIMA to LAD, SVG to OM, SVG to PDA at [facility] 2014),  non-dilated ICM (EF 20-25%, normal), severe MR s/p mitral clip 6/13, hypothyroidism who presented to [facility] in acute on chronic systolic heart failure.  Diuresed w/ IV diuretic infusion and placed on iontropes initially.  Weaned off ionotropes successfully on 6/1 w/ improvement in clinical status.  Transferred to [facility] on 6/11 for mitral clip evaluation.  Underwent mitral clip procedure on 6/13 w/o issues/complications.  Re-started back on IV diuretics and switched on PO on 6/16.  Seems to be responding to oral diuretic dosage.

## 2019-06-18 NOTE — PROGRESS NOTE ADULT - PROBLEM SELECTOR PROBLEM 5
Type 2 diabetes mellitus without complication, with long-term current use of insulin
CAD (coronary artery disease)
Chronic kidney disease, unspecified CKD stage

## 2019-06-18 NOTE — PROGRESS NOTE ADULT - SUBJECTIVE AND OBJECTIVE BOX
- no acute overnight events   - states that her dyspnea has improved     Medications:  acetaminophen   Tablet .. 650 milliGRAM(s) Oral every 6 hours PRN  aspirin enteric coated 81 milliGRAM(s) Oral daily  atorvastatin 40 milliGRAM(s) Oral at bedtime  dextrose 40% Gel 15 Gram(s) Oral once PRN  dextrose 5%. 1000 milliLiter(s) IV Continuous <Continuous>  dextrose 50% Injectable 12.5 Gram(s) IV Push once  dextrose 50% Injectable 25 Gram(s) IV Push once  dextrose 50% Injectable 25 Gram(s) IV Push once  docusate sodium 100 milliGRAM(s) Oral two times a day  glucagon  Injectable 1 milliGRAM(s) IntraMuscular once PRN  heparin  Injectable 5000 Unit(s) SubCutaneous every 12 hours  hydrALAZINE 25 milliGRAM(s) Oral every 8 hours  insulin glargine Injectable (LANTUS) 48 Unit(s) SubCutaneous at bedtime  insulin lispro (HumaLOG) corrective regimen sliding scale   SubCutaneous three times a day before meals  insulin lispro (HumaLOG) corrective regimen sliding scale   SubCutaneous at bedtime  insulin lispro Injectable (HumaLOG) 22 Unit(s) SubCutaneous three times a day before meals  isosorbide   dinitrate Tablet (ISORDIL) 10 milliGRAM(s) Oral three times a day  levothyroxine 50 MICROGram(s) Oral daily  montelukast 10 milliGRAM(s) Oral daily  pantoprazole    Tablet 40 milliGRAM(s) Oral before breakfast  senna 2 Tablet(s) Oral at bedtime  sodium chloride 0.9%. 1000 milliLiter(s) IV Continuous <Continuous>  ticagrelor 90 milliGRAM(s) Oral two times a day  trimethoprim   80 mG/sulfamethoxazole 400 mG 1 Tablet(s) Oral every 12 hours      PAST MEDICAL & SURGICAL HISTORY:  GERD (gastroesophageal reflux disease)  CAD (coronary artery disease): s/p CABG  Hypothyroidism  Hyperlipidemia  Diabetes mellitus, type 2  S/P CABG (coronary artery bypass graft)        Vitals:  T(F): 98 (06-18), Max: 98.5 (06-17)  HR: 85 (06-18) (85 - 112)  BP: 94/58 (06-18) (94/55 - 110/66)  RR: 20 (06-18)  SpO2: 99% (06-18)  I&O's Summary    17 Jun 2019 07:01 - 18 Jun 2019 07:00  --------------------------------------------------------  IN: 490 mL / OUT: 1250 mL / NET: -760 mL    18 Jun 2019 07:01  -  18 Jun 2019 10:28  --------------------------------------------------------  IN: 480 mL / OUT: 0 mL / NET: 480 mL        Physical Exam:  GENERAL: No acute distress, well-developed  HEAD:  Atraumatic, Normocephalic  ENT: EOMI, PERRLA, conjunctiva and sclera clear, Neck supple, +JVP mod elevated  CHEST/LUNG: Clear to auscultation bilaterally; No wheeze, equal breath sounds bilaterally   BACK: No spinal tenderness  HEART: Regular rate and rhythm; No murmurs, rubs, or gallops  ABDOMEN: Soft, Nontender, +distended w/ normal Bowel sounds  EXTREMITIES:  No clubbing, cyanosis, or edema  PSYCH: Nl behavior, nl affect  NEUROLOGY: AAOx3, non-focal, cranial nerves intact  SKIN: Normal color, No rashes or lesions                          8.3    8.4   )-----------( 310      ( 18 Jun 2019 06:05 )             24.6     06-18    133<L>  |  100  |  54<H>  ----------------------------<  257<H>  4.9   |  18<L>  |  2.52<H>    Ca    9.4      18 Jun 2019 06:05    TPro  7.3  /  Alb  3.6  /  TBili  0.2  /  DBili  x   /  AST  14  /  ALT  20  /  AlkPhos  112  06-17    Interpretation of Telemetry:  sinus rhythm, HR 90s - no acute overnight events   - states that her dyspnea has improved     Medications:  acetaminophen   Tablet .. 650 milliGRAM(s) Oral every 6 hours PRN  aspirin enteric coated 81 milliGRAM(s) Oral daily  atorvastatin 40 milliGRAM(s) Oral at bedtime  dextrose 40% Gel 15 Gram(s) Oral once PRN  dextrose 5%. 1000 milliLiter(s) IV Continuous <Continuous>  dextrose 50% Injectable 12.5 Gram(s) IV Push once  dextrose 50% Injectable 25 Gram(s) IV Push once  dextrose 50% Injectable 25 Gram(s) IV Push once  docusate sodium 100 milliGRAM(s) Oral two times a day  glucagon  Injectable 1 milliGRAM(s) IntraMuscular once PRN  heparin  Injectable 5000 Unit(s) SubCutaneous every 12 hours  hydrALAZINE 25 milliGRAM(s) Oral every 8 hours  insulin glargine Injectable (LANTUS) 48 Unit(s) SubCutaneous at bedtime  insulin lispro (HumaLOG) corrective regimen sliding scale   SubCutaneous three times a day before meals  insulin lispro (HumaLOG) corrective regimen sliding scale   SubCutaneous at bedtime  insulin lispro Injectable (HumaLOG) 22 Unit(s) SubCutaneous three times a day before meals  isosorbide   dinitrate Tablet (ISORDIL) 10 milliGRAM(s) Oral three times a day  levothyroxine 50 MICROGram(s) Oral daily  montelukast 10 milliGRAM(s) Oral daily  pantoprazole    Tablet 40 milliGRAM(s) Oral before breakfast  senna 2 Tablet(s) Oral at bedtime  sodium chloride 0.9%. 1000 milliLiter(s) IV Continuous <Continuous>  ticagrelor 90 milliGRAM(s) Oral two times a day  trimethoprim   80 mG/sulfamethoxazole 400 mG 1 Tablet(s) Oral every 12 hours      PAST MEDICAL & SURGICAL HISTORY:  GERD (gastroesophageal reflux disease)  CAD (coronary artery disease): s/p CABG  Hypothyroidism  Hyperlipidemia  Diabetes mellitus, type 2  S/P CABG (coronary artery bypass graft)        Vitals:  T(F): 98 (06-18), Max: 98.5 (06-17)  HR: 85 (06-18) (85 - 112)  BP: 94/58 (06-18) (94/55 - 110/66)  RR: 20 (06-18)  SpO2: 99% (06-18)  I&O's Summary    17 Jun 2019 07:01  -  18 Jun 2019 07:00  --------------------------------------------------------  IN: 490 mL / OUT: 1250 mL / NET: -760 mL    18 Jun 2019 07:01  -  18 Jun 2019 10:28  --------------------------------------------------------  IN: 480 mL / OUT: 0 mL / NET: 480 mL        Physical Exam:  GENERAL: No acute distress, well-developed  HEAD:  Atraumatic, Normocephalic  ENT: EOMI, PERRLA, conjunctiva and sclera clear, Neck supple, +JVP unable to be appreciated today   CHEST/LUNG: Clear to auscultation bilaterally; No wheeze, equal breath sounds bilaterally   BACK: No spinal tenderness  HEART: Regular rate and rhythm; No murmurs, rubs, or gallops  ABDOMEN: Soft, Nontender, +distended w/ normal Bowel sounds  EXTREMITIES:  No clubbing, cyanosis, or edema  PSYCH: Nl behavior, nl affect  NEUROLOGY: AAOx3, non-focal, cranial nerves intact  SKIN: Normal color, No rashes or lesions                          8.3    8.4   )-----------( 310      ( 18 Jun 2019 06:05 )             24.6     06-18    133<L>  |  100  |  54<H>  ----------------------------<  257<H>  4.9   |  18<L>  |  2.52<H>    Ca    9.4      18 Jun 2019 06:05    TPro  7.3  /  Alb  3.6  /  TBili  0.2  /  DBili  x   /  AST  14  /  ALT  20  /  AlkPhos  112  06-17    Interpretation of Telemetry:  sinus rhythm, HR 90s

## 2019-06-18 NOTE — DISCHARGE NOTE PROVIDER - CARE PROVIDERS DIRECT ADDRESSES
[email or web address]

## 2019-06-18 NOTE — DISCHARGE NOTE PROVIDER - NSDCCPCAREPLAN_GEN_ALL_CORE_FT
PRINCIPAL DISCHARGE DIAGNOSIS  Diagnosis: Mild mitral insufficiency  Assessment and Plan of Treatment: sp  mitral clip PRINCIPAL DISCHARGE DIAGNOSIS  Diagnosis: S/P mitral valve clip implantation  Assessment and Plan of Treatment: shower daily  weigh yourself daily; I & o's   physical therapy daily   cbc and bmp every monday and thursday at rehab   continue current prescriptions as ordered  increase activity as tolerated   no added salt; low fat; low cholesterol, low salt diabetic diet.   check blood sugar levels before meals and at bedtime- record levels   follow up with Cardiologist, Dr. Hira Gregg within 1-2 weeks after discharge from rehab.  Call to schedule appointment.  follow up with cardiac surgeon, Dr. Newman, within 7- 10 days of discharge from rehab. CAll to schedule appointment. [phone]   follow up with Dr. Banks, endocrinologist, within 1-2 weeks after discharge from rehab. Call to schedule appointment.   follow up with nephrologist, within 1 week of discharge from rehab. Call to schedule appointment.

## 2019-06-18 NOTE — PROGRESS NOTE ADULT - PROBLEM SELECTOR PLAN 4
renal following  cr increased to 2.52  today  change bumex to 2 BID  in am  hold diuretics tonight   per renal

## 2019-06-18 NOTE — DISCHARGE NOTE PROVIDER - NSDCFUADDINST_GEN_ALL_CORE_FT
shower daily call Dr. Newman for fever > 101  physical therapy daily at rehab  check blood sugar levels before meals and at bedtime - record levels

## 2019-06-18 NOTE — DISCHARGE NOTE PROVIDER - NSDCPNSUBOBJ_GEN_ALL_CORE
VSS    tele: RSR 80-90     lungs clear, RR easy unlabored    +bs nt nd + bm; neg n/v/d    LE: neg calf tenderness, right groin cdi coco- neg hematoma, trace LE edema bilaterally. PPP        Discharge pt to rehab today as per Dr. Newman and CHF team

## 2019-06-18 NOTE — PROGRESS NOTE ADULT - PROBLEM SELECTOR PLAN 1
- cont PO bumex 2 mg BID   - increase hydralazine to 37.5 mg TID   - if able to tolerate above oral hydralazine dose (SBP>90) at 1400, increase isordil to 20 mg TID   - cont holding aldactone.

## 2019-06-18 NOTE — DISCHARGE NOTE PROVIDER - CARE PROVIDER_API CALL
Anamika Newman)  Surgery; Thoracic and Cardiac Surgery  [address] [place] [address]  Phone: [phone]  Fax: [phone]  Follow Up Time: Anamika Newman)  Surgery; Thoracic and Cardiac Surgery  [address] [place] [address]  Phone: [phone]  Fax: [phone]  Follow Up Time:     Hira Gregg)  Cardiovascular Disease; Internal Medicine  [address] [place] [address]  Phone: [phone]  Fax: [phone]  Follow Up Time:     Jillian Banks)  EndocrinologyMetabDiabetes; Internal Medicine  [address] [place] [address]  Phone: [phone]  Fax: [phone]  Follow Up Time:

## 2019-06-18 NOTE — DISCHARGE NOTE PROVIDER - HOSPITAL COURSE
73 F PMH HTN, T2DM, CAD s/p CABG (LIMA to LAD, SVG to OM, SVG to PDA at [facility] 2014), HFrEF (LVEF 25%) severe MR, severe pulm HTN, hypothyroidism, presents from [facility] to [facility] initially on 6/1/19 with sob, increased work of breathing, f/w ADHF and severe MR, cardiorenal syndrome with duncan on ckd, now s/p IV diuresis and transferred over for eval for mitral clip procedure in the setting of recurrent readmissions for ADHF exacerbation.          6/13 s/p mitral clip, post op requiring vasopressors for hypotension    6/14 VSS, s/p 1 PRBC, transferred to [facility]. Tolerating current medical regiment. Cont diuretics.      6/15 VSS; continue diuresis; xanax initiated for anxiety; ck postop Echo; continue bactrim for a UTI; endo consulted today for uncontrolled dm    6/16 VSS: Bumex changed to PO 2 mg BID, aldactone d/c'd. SCr 2 today, renal recs appreciated. Heart failure consulted for medical optimization. Colchicine started for Right foot gout.    6/17 VSS: cr 2.2;  continue diuretics as per CHF and renal; increase hydralazine 25 mg po tid;  ck pa/ lateral chest xray; encourage ambulation; o2 sats  94% RA 6/18     OOB to chair   creat up to 2.52   conversation with renal   stopr evening dose bumex and restart in am bumex 2 mg  BID  is neg 760 cc in 24 hrs    6/19 VSS ; bun/cr stable 54/2/5 stable; continue diuresis     pt stable for discharge to rehab as per CHF team and Dr. Newman

## 2019-06-18 NOTE — PROGRESS NOTE ADULT - SUBJECTIVE AND OBJECTIVE BOX
VITAL SIGNS    Telemetry:      Vital Signs Last 24 Hrs  T(C): 36.7 (19 @ 05:26), Max: 36.9 (19 @ 14:05)  T(F): 98 (19 @ 05:26), Max: 98.5 (19 @ 14:05)  HR: 85 (19 @ 05:26) (85 - 112)  BP: 94/58 (19 @ 05:26) (94/55 - 110/66)  RR: 20 (19 @ 05:26) (18 - 20)  SpO2: 99% (19 @ 05:26) (98% - 99%)                   Daily     Daily Weight in k.6 (2019 07:39)        CAPILLARY BLOOD GLUCOSE      POCT Blood Glucose.: 220 mg/dL (2019 07:37)  POCT Blood Glucose.: 305 mg/dL (2019 21:54)  POCT Blood Glucose.: 195 mg/dL (2019 16:22)  POCT Blood Glucose.: 251 mg/dL (2019 14:10)  POCT Blood Glucose.: 242 mg/dL (2019 11:50)                         PHYSICAL EXAM  S   appears No SOB  no pain"  Neurology: alert and oriented x 3, moves all extremities with no defecits  CV :  RRR  Lungs:  diminshed bl  bases  Abdomen: soft, nontender, nondistended, positive bowel sounds,   Extremities:     no edema  no  calf tederness

## 2019-06-18 NOTE — PROGRESS NOTE ADULT - SUBJECTIVE AND OBJECTIVE BOX
Chief complaint  Patient is a 73y old  Female who presents with a chief complaint of mitral clip (18 Jun 2019 15:23)   Review of systems  Patient in bed, looks comfortable, no fever, no hypoglycemia.    Labs and Fingersticks  CAPILLARY BLOOD GLUCOSE      POCT Blood Glucose.: 125 mg/dL (18 Jun 2019 16:42)  POCT Blood Glucose.: 130 mg/dL (18 Jun 2019 16:15)  POCT Blood Glucose.: 136 mg/dL (18 Jun 2019 12:08)  POCT Blood Glucose.: 220 mg/dL (18 Jun 2019 07:37)  POCT Blood Glucose.: 305 mg/dL (17 Jun 2019 21:54)      Anion Gap, Serum: 15 (06-18 @ 06:05)  Anion Gap, Serum: 15 (06-17 @ 06:26)      Calcium, Total Serum: 9.4 (06-18 @ 06:05)  Calcium, Total Serum: 9.3 (06-17 @ 06:26)  Albumin, Serum: 3.6 (06-17 @ 06:26)    Alanine Aminotransferase (ALT/SGPT): 20 (06-17 @ 06:26)  Alkaline Phosphatase, Serum: 112 (06-17 @ 06:26)  Aspartate Aminotransferase (AST/SGOT): 14 (06-17 @ 06:26)        06-18    133<L>  |  100  |  54<H>  ----------------------------<  257<H>  4.9   |  18<L>  |  2.52<H>    Ca    9.4      18 Jun 2019 06:05    TPro  7.3  /  Alb  3.6  /  TBili  0.2  /  DBili  x   /  AST  14  /  ALT  20  /  AlkPhos  112  06-17                        8.3    8.4   )-----------( 310      ( 18 Jun 2019 06:05 )             24.6     Medications  MEDICATIONS  (STANDING):  aspirin enteric coated 81 milliGRAM(s) Oral daily  atorvastatin 40 milliGRAM(s) Oral at bedtime  dextrose 5%. 1000 milliLiter(s) (50 mL/Hr) IV Continuous <Continuous>  dextrose 50% Injectable 12.5 Gram(s) IV Push once  dextrose 50% Injectable 25 Gram(s) IV Push once  dextrose 50% Injectable 25 Gram(s) IV Push once  docusate sodium 100 milliGRAM(s) Oral two times a day  heparin  Injectable 5000 Unit(s) SubCutaneous every 12 hours  hydrALAZINE 37.5 milliGRAM(s) Oral every 8 hours  insulin glargine Injectable (LANTUS) 48 Unit(s) SubCutaneous at bedtime  insulin lispro (HumaLOG) corrective regimen sliding scale   SubCutaneous three times a day before meals  insulin lispro (HumaLOG) corrective regimen sliding scale   SubCutaneous at bedtime  insulin lispro Injectable (HumaLOG) 22 Unit(s) SubCutaneous three times a day before meals  isosorbide   dinitrate Tablet (ISORDIL) 10 milliGRAM(s) Oral three times a day  levothyroxine 50 MICROGram(s) Oral daily  montelukast 10 milliGRAM(s) Oral daily  pantoprazole    Tablet 40 milliGRAM(s) Oral before breakfast  senna 2 Tablet(s) Oral at bedtime  sodium chloride 0.9%. 1000 milliLiter(s) (10 mL/Hr) IV Continuous <Continuous>  ticagrelor 90 milliGRAM(s) Oral two times a day  trimethoprim   80 mG/sulfamethoxazole 400 mG 1 Tablet(s) Oral every 12 hours      Physical Exam  General: Patient comfortable in bed  Vital Signs Last 12 Hrs  T(F): 97.9 (06-18-19 @ 16:00), Max: 98.3 (06-18-19 @ 13:50)  HR: 89 (06-18-19 @ 16:00) (89 - 95)  BP: 101/59 (06-18-19 @ 16:00) (96/60 - 101/59)  BP(mean): --  RR: 32 (06-18-19 @ 16:00) (18 - 32)  SpO2: 99% (06-18-19 @ 16:00) (99% - 99%)  Neck: No palpable thyroid nodules.  CVS: S1S2, No murmurs  Respiratory: No wheezing, no crepitations  GI: Abdomen soft, bowel sounds positive  Musculoskeletal:  edema lower extremities.   Skin: No skin rashes, no ecchymosis    Diagnostics

## 2019-06-18 NOTE — PROGRESS NOTE ADULT - SUBJECTIVE AND OBJECTIVE BOX
[facility]   MD RAHEEL SHAH MD RUORU WONG, PA    TEL:  OFFICE: [phone]  DR SANTOYO CELL: [phone]  JUSTEN KENDALL CELL: [phone]  DR. NGUYEN CELL: [phone]    RENAL FOLLOW UP NOTE  --------------------------------------------------------------------------------  HPI:      Pt seen and examined at bedside.       PAST HISTORY  --------------------------------------------------------------------------------  No significant changes to PMH, PSH, FHx, SHx, unless otherwise noted    ALLERGIES & MEDICATIONS  --------------------------------------------------------------------------------  Allergies    azithromycin (Hives; Pruritus)    Intolerances      Standing Inpatient Medications  aspirin enteric coated 81 milliGRAM(s) Oral daily  atorvastatin 40 milliGRAM(s) Oral at bedtime  dextrose 5%. 1000 milliLiter(s) IV Continuous <Continuous>  dextrose 50% Injectable 12.5 Gram(s) IV Push once  dextrose 50% Injectable 25 Gram(s) IV Push once  dextrose 50% Injectable 25 Gram(s) IV Push once  docusate sodium 100 milliGRAM(s) Oral two times a day  heparin  Injectable 5000 Unit(s) SubCutaneous every 12 hours  hydrALAZINE 37.5 milliGRAM(s) Oral every 8 hours  insulin glargine Injectable (LANTUS) 48 Unit(s) SubCutaneous at bedtime  insulin lispro (HumaLOG) corrective regimen sliding scale   SubCutaneous three times a day before meals  insulin lispro (HumaLOG) corrective regimen sliding scale   SubCutaneous at bedtime  insulin lispro Injectable (HumaLOG) 22 Unit(s) SubCutaneous three times a day before meals  isosorbide   dinitrate Tablet (ISORDIL) 10 milliGRAM(s) Oral three times a day  levothyroxine 50 MICROGram(s) Oral daily  montelukast 10 milliGRAM(s) Oral daily  pantoprazole    Tablet 40 milliGRAM(s) Oral before breakfast  senna 2 Tablet(s) Oral at bedtime  sodium chloride 0.9%. 1000 milliLiter(s) IV Continuous <Continuous>  ticagrelor 90 milliGRAM(s) Oral two times a day  trimethoprim   80 mG/sulfamethoxazole 400 mG 1 Tablet(s) Oral every 12 hours    PRN Inpatient Medications  acetaminophen   Tablet .. 650 milliGRAM(s) Oral every 6 hours PRN  dextrose 40% Gel 15 Gram(s) Oral once PRN  glucagon  Injectable 1 milliGRAM(s) IntraMuscular once PRN      REVIEW OF SYSTEMS  --------------------------------------------------------------------------------  General: no fever  CVS: no chest pain  RESP: no sob, no cough  MSK: no edema     VITALS/PHYSICAL EXAM  --------------------------------------------------------------------------------  T(C): 36.7 (06-18-19 @ 05:26), Max: 36.9 (06-17-19 @ 14:05)  HR: 85 (06-18-19 @ 05:26) (85 - 112)  BP: 94/58 (06-18-19 @ 05:26) (94/55 - 110/66)  RR: 20 (06-18-19 @ 05:26) (18 - 20)  SpO2: 99% (06-18-19 @ 05:26) (98% - 99%)  Wt(kg): --        06-17-19 @ 07:01  -  06-18-19 @ 07:00  --------------------------------------------------------  IN: 490 mL / OUT: 1250 mL / NET: -760 mL    06-18-19 @ 07:01  -  06-18-19 @ 12:24  --------------------------------------------------------  IN: 480 mL / OUT: 0 mL / NET: 480 mL      Physical Exam:  	Gen: NAD  	HEENT: MMM  	Pulm: CTA B/L  	CV: S1S2  	Abd: Soft, +BS  	Ext: No LE edema B/L                      Neuro: Awake   	Skin: Warm and Dry   	 no polo    LABS/STUDIES  --------------------------------------------------------------------------------              8.3    8.4   >-----------<  310      [06-18-19 @ 06:05]              24.6     133  |  100  |  54  ----------------------------<  257      [06-18-19 @ 06:05]  4.9   |  18  |  2.52        Ca     9.4     [06-18-19 @ 06:05]    TPro  7.3  /  Alb  3.6  /  TBili  0.2  /  DBili  x   /  AST  14  /  ALT  20  /  AlkPhos  112  [06-17-19 @ 06:26]          Creatinine Trend:  SCr 2.52 [06-18 @ 06:05]  SCr 2.27 [06-17 @ 06:26]  SCr 2.03 [06-16 @ 07:08]  SCr 1.83 [06-15 @ 05:50]  SCr 2.18 [06-14 @ 00:56]    Urinalysis - [06-14-19 @ 16:33]      Color Light Yellow / Appearance Clear / SG 1.016 / pH 5.5      Gluc 500 mg/dL / Ketone Negative  / Bili Negative / Urobili Negative       Blood Trace / Protein 30 mg/dL / Leuk Est Small / Nitrite Negative      RBC 10 / WBC 10 / Hyaline 2 / Gran  / Sq Epi  / Non Sq Epi 1 / Bacteria Negative      .4 (Ca --)      [04-25-19 @ 05:45]   --  PTH 43.55 (Ca --)      [09-10-18 @ 07:15]   --  PTH 36.60 (Ca --)      [09-08-18 @ 03:15]   --  Vitamin D (25OH) 25.9      [05-01-19 @ 04:00]  HbA1c 7.8      [06-01-19 @ 08:00]  TSH 0.75      [06-01-19 @ 08:00]  Lipid: chol 148, , HDL 35,       [06-01-19 @ 08:00]

## 2019-06-18 NOTE — PROGRESS NOTE ADULT - ASSESSMENT
Assessment  DMT2: 73y Female with DM T2 with hyperglycemia, on basal bolus insulin, dose was increased, her blood sugars improving, no hypoglycemic episode,  eating meals,  non compliant with low carb diet.  MVR/CAD: on medications, no chest pain, stable, planing surgery, monitored.  Hypothyroidism: On synthroid 50 mcg daily.  HTN: Controlled,  on antihypertensive medications.  CKD: Monitor labs/BMP,   Obesity: No strict exercise routines, not on any weight loss program, neither on low calorie diet.          Jillian Banks MD  Cell: [phone]  Office: [phone]

## 2019-06-18 NOTE — PROGRESS NOTE ADULT - PROBLEM SELECTOR PLAN 1
TTE 6/15 - minimal MR - severe LV dysfunction   continue asa, statin, brillinta/ increase hydralazine 25 mg po q  Discharge planning- rehab wed if cr stable Principal Discharge DX:	Traumatic injury of head, initial encounter

## 2019-06-18 NOTE — DISCHARGE NOTE PROVIDER - PROVIDER TOKENS
PROVIDER:[TOKEN:[[id number]]] PROVIDER:[TOKEN:[[id number]]],PROVIDER:[TOKEN:[[id number]]],PROVIDER:[TOKEN:[[id number]]]

## 2019-06-18 NOTE — PROGRESS NOTE ADULT - ASSESSMENT
73 F PMH HTN, T2DM, CAD s/p CABG (LIMA to LAD, SVG to OM, SVG to PDA at [facility] 2014), HFrEF (LVEF 25%) severe MR, severe pulm HTN, hypothyroidism, [facility] on 6/1/19 with sob, increased work of breathing, f/w ADHF and severe MR, cardiorenal syndrome with duncan on ckd, now s/p IV diuresis and transferred over for eval for mitral clip procedure in the setting of recurrent readmissions for ADHF exacerbation.      6/13 s/p mitral clip, post op requiring vasopressors for hypotension  6/14 VSS, s/p 1 PRBC, transferred to [facility]. Tolerating current medical regiment. Cont diuretics.    6/15 VSS; continue diuresis; xanax initiated for anxiety; ck postop Echo; continue bactrim for a UTI; endo consulted today for uncontrolled dm  6/16 VSS: Bumex changed to PO 2 mg BID, aldactone d/c'd. SCr 2 today, renal recs appreciated. Heart failure consulted for medical optimization. Colchicine started for Right foot gout.  6/17 VSS: cr 2.2;  continue diuretics as per CHF and renal; increase hydralazine 25 mg po tid;  ck pa/ lateral chest xray; encourage ambulation; o2 sats  94% RA   6/18     OOB to chair   creat up to 2.52   conversation with renal   stopr evening dose bumex and restart in am bumex 2 mg  BID  is neg 760 cc in 24 hrs

## 2019-06-19 ENCOUNTER — TRANSCRIPTION ENCOUNTER (OUTPATIENT)
Age: 74
End: 2019-06-19

## 2019-06-19 VITALS
RESPIRATION RATE: 18 BRPM | OXYGEN SATURATION: 97 % | SYSTOLIC BLOOD PRESSURE: 94 MMHG | TEMPERATURE: 98 F | DIASTOLIC BLOOD PRESSURE: 54 MMHG | HEART RATE: 63 BPM

## 2019-06-19 LAB
ANION GAP SERPL CALC-SCNC: 16 MMOL/L — SIGNIFICANT CHANGE UP (ref 5–17)
BUN SERPL-MCNC: 54 MG/DL — HIGH (ref 7–23)
CALCIUM SERPL-MCNC: 10.4 MG/DL — SIGNIFICANT CHANGE UP (ref 8.4–10.5)
CHLORIDE SERPL-SCNC: 100 MMOL/L — SIGNIFICANT CHANGE UP (ref 96–108)
CO2 SERPL-SCNC: 21 MMOL/L — LOW (ref 22–31)
CREAT SERPL-MCNC: 2.54 MG/DL — HIGH (ref 0.5–1.3)
GLUCOSE BLDC GLUCOMTR-MCNC: 175 MG/DL — HIGH (ref 70–99)
GLUCOSE BLDC GLUCOMTR-MCNC: 192 MG/DL — HIGH (ref 70–99)
GLUCOSE BLDC GLUCOMTR-MCNC: 226 MG/DL — HIGH (ref 70–99)
GLUCOSE SERPL-MCNC: 157 MG/DL — HIGH (ref 70–99)
HCT VFR BLD CALC: 25.4 % — LOW (ref 34.5–45)
HGB BLD-MCNC: 8.4 G/DL — LOW (ref 11.5–15.5)
MCHC RBC-ENTMCNC: 28.6 PG — SIGNIFICANT CHANGE UP (ref 27–34)
MCHC RBC-ENTMCNC: 32.9 GM/DL — SIGNIFICANT CHANGE UP (ref 32–36)
MCV RBC AUTO: 86.8 FL — SIGNIFICANT CHANGE UP (ref 80–100)
PLATELET # BLD AUTO: 330 K/UL — SIGNIFICANT CHANGE UP (ref 150–400)
POTASSIUM SERPL-MCNC: 4.7 MMOL/L — SIGNIFICANT CHANGE UP (ref 3.5–5.3)
POTASSIUM SERPL-SCNC: 4.7 MMOL/L — SIGNIFICANT CHANGE UP (ref 3.5–5.3)
RBC # BLD: 2.93 M/UL — LOW (ref 3.8–5.2)
RBC # FLD: 15.9 % — HIGH (ref 10.3–14.5)
SODIUM SERPL-SCNC: 137 MMOL/L — SIGNIFICANT CHANGE UP (ref 135–145)
WBC # BLD: 9.4 K/UL — SIGNIFICANT CHANGE UP (ref 3.8–10.5)
WBC # FLD AUTO: 9.4 K/UL — SIGNIFICANT CHANGE UP (ref 3.8–10.5)

## 2019-06-19 PROCEDURE — 82947 ASSAY GLUCOSE BLOOD QUANT: CPT

## 2019-06-19 PROCEDURE — 82803 BLOOD GASES ANY COMBINATION: CPT

## 2019-06-19 PROCEDURE — 86901 BLOOD TYPING SEROLOGIC RH(D): CPT

## 2019-06-19 PROCEDURE — 94010 BREATHING CAPACITY TEST: CPT

## 2019-06-19 PROCEDURE — 80048 BASIC METABOLIC PNL TOTAL CA: CPT

## 2019-06-19 PROCEDURE — 85014 HEMATOCRIT: CPT

## 2019-06-19 PROCEDURE — 93355 ECHO TRANSESOPHAGEAL (TEE): CPT

## 2019-06-19 PROCEDURE — 84295 ASSAY OF SERUM SODIUM: CPT

## 2019-06-19 PROCEDURE — 99231 SBSQ HOSP IP/OBS SF/LOW 25: CPT

## 2019-06-19 PROCEDURE — 81001 URINALYSIS AUTO W/SCOPE: CPT

## 2019-06-19 PROCEDURE — 94002 VENT MGMT INPAT INIT DAY: CPT

## 2019-06-19 PROCEDURE — 87641 MR-STAPH DNA AMP PROBE: CPT

## 2019-06-19 PROCEDURE — 87186 SC STD MICRODIL/AGAR DIL: CPT

## 2019-06-19 PROCEDURE — 83605 ASSAY OF LACTIC ACID: CPT

## 2019-06-19 PROCEDURE — 93306 TTE W/DOPPLER COMPLETE: CPT

## 2019-06-19 PROCEDURE — C1887: CPT

## 2019-06-19 PROCEDURE — 71045 X-RAY EXAM CHEST 1 VIEW: CPT

## 2019-06-19 PROCEDURE — P9045: CPT

## 2019-06-19 PROCEDURE — C1769: CPT

## 2019-06-19 PROCEDURE — 36430 TRANSFUSION BLD/BLD COMPNT: CPT

## 2019-06-19 PROCEDURE — 86900 BLOOD TYPING SEROLOGIC ABO: CPT

## 2019-06-19 PROCEDURE — 84100 ASSAY OF PHOSPHORUS: CPT

## 2019-06-19 PROCEDURE — C1894: CPT

## 2019-06-19 PROCEDURE — 86923 COMPATIBILITY TEST ELECTRIC: CPT

## 2019-06-19 PROCEDURE — 82962 GLUCOSE BLOOD TEST: CPT

## 2019-06-19 PROCEDURE — 33418 REPAIR TCAT MITRAL VALVE: CPT | Mod: Q0

## 2019-06-19 PROCEDURE — 85610 PROTHROMBIN TIME: CPT

## 2019-06-19 PROCEDURE — 94660 CPAP INITIATION&MGMT: CPT

## 2019-06-19 PROCEDURE — 85730 THROMBOPLASTIN TIME PARTIAL: CPT

## 2019-06-19 PROCEDURE — 93880 EXTRACRANIAL BILAT STUDY: CPT

## 2019-06-19 PROCEDURE — 87640 STAPH A DNA AMP PROBE: CPT

## 2019-06-19 PROCEDURE — 86850 RBC ANTIBODY SCREEN: CPT

## 2019-06-19 PROCEDURE — 84436 ASSAY OF TOTAL THYROXINE: CPT

## 2019-06-19 PROCEDURE — 87086 URINE CULTURE/COLONY COUNT: CPT

## 2019-06-19 PROCEDURE — 84132 ASSAY OF SERUM POTASSIUM: CPT

## 2019-06-19 PROCEDURE — 97164 PT RE-EVAL EST PLAN CARE: CPT

## 2019-06-19 PROCEDURE — C1889: CPT

## 2019-06-19 PROCEDURE — 85027 COMPLETE CBC AUTOMATED: CPT

## 2019-06-19 PROCEDURE — 97162 PT EVAL MOD COMPLEX 30 MIN: CPT

## 2019-06-19 PROCEDURE — 80053 COMPREHEN METABOLIC PANEL: CPT

## 2019-06-19 PROCEDURE — 82330 ASSAY OF CALCIUM: CPT

## 2019-06-19 PROCEDURE — 71045 X-RAY EXAM CHEST 1 VIEW: CPT | Mod: 26

## 2019-06-19 PROCEDURE — 83735 ASSAY OF MAGNESIUM: CPT

## 2019-06-19 PROCEDURE — P9016: CPT

## 2019-06-19 PROCEDURE — 85384 FIBRINOGEN ACTIVITY: CPT

## 2019-06-19 PROCEDURE — 71046 X-RAY EXAM CHEST 2 VIEWS: CPT

## 2019-06-19 PROCEDURE — 82435 ASSAY OF BLOOD CHLORIDE: CPT

## 2019-06-19 PROCEDURE — 93005 ELECTROCARDIOGRAM TRACING: CPT

## 2019-06-19 PROCEDURE — 84480 ASSAY TRIIODOTHYRONINE (T3): CPT

## 2019-06-19 RX ORDER — HYDRALAZINE HCL 50 MG
37.5 TABLET ORAL
Qty: 0 | Refills: 0 | DISCHARGE
Start: 2019-06-19

## 2019-06-19 RX ORDER — INSULIN GLARGINE 100 [IU]/ML
50 INJECTION, SOLUTION SUBCUTANEOUS AT BEDTIME
Refills: 0 | Status: DISCONTINUED | OUTPATIENT
Start: 2019-06-19 | End: 2019-06-19

## 2019-06-19 RX ORDER — TICAGRELOR 90 MG/1
1 TABLET ORAL
Qty: 0 | Refills: 0 | DISCHARGE
Start: 2019-06-19

## 2019-06-19 RX ORDER — BUMETANIDE 0.25 MG/ML
1 INJECTION INTRAMUSCULAR; INTRAVENOUS
Qty: 0 | Refills: 0 | DISCHARGE
Start: 2019-06-19

## 2019-06-19 RX ORDER — MONTELUKAST 4 MG/1
1 TABLET, CHEWABLE ORAL
Qty: 0 | Refills: 0 | DISCHARGE
Start: 2019-06-19

## 2019-06-19 RX ORDER — ISOSORBIDE DINITRATE 5 MG/1
1 TABLET ORAL
Qty: 0 | Refills: 0 | DISCHARGE
Start: 2019-06-19

## 2019-06-19 RX ORDER — DOCUSATE SODIUM 100 MG
1 CAPSULE ORAL
Qty: 0 | Refills: 0 | DISCHARGE
Start: 2019-06-19

## 2019-06-19 RX ORDER — METOPROLOL TARTRATE 50 MG
5 TABLET ORAL ONCE
Refills: 0 | Status: DISCONTINUED | OUTPATIENT
Start: 2019-06-19 | End: 2019-06-19

## 2019-06-19 RX ORDER — SENNA PLUS 8.6 MG/1
2 TABLET ORAL
Qty: 0 | Refills: 0 | DISCHARGE
Start: 2019-06-19

## 2019-06-19 RX ORDER — PANTOPRAZOLE SODIUM 20 MG/1
1 TABLET, DELAYED RELEASE ORAL
Qty: 0 | Refills: 0 | DISCHARGE
Start: 2019-06-19

## 2019-06-19 RX ORDER — LEVOTHYROXINE SODIUM 125 MCG
1 TABLET ORAL
Qty: 0 | Refills: 0 | DISCHARGE
Start: 2019-06-19

## 2019-06-19 RX ORDER — INSULIN LISPRO 100/ML
10 VIAL (ML) SUBCUTANEOUS
Qty: 0 | Refills: 0 | DISCHARGE

## 2019-06-19 RX ORDER — INSULIN LISPRO 100/ML
24 VIAL (ML) SUBCUTANEOUS
Refills: 0 | Status: DISCONTINUED | OUTPATIENT
Start: 2019-06-19 | End: 2019-06-19

## 2019-06-19 RX ORDER — ATORVASTATIN CALCIUM 80 MG/1
1 TABLET, FILM COATED ORAL
Qty: 0 | Refills: 0 | DISCHARGE
Start: 2019-06-19

## 2019-06-19 RX ORDER — ACETAMINOPHEN 500 MG
2 TABLET ORAL
Qty: 0 | Refills: 0 | DISCHARGE
Start: 2019-06-19

## 2019-06-19 RX ORDER — HEPARIN SODIUM 5000 [USP'U]/ML
5000 INJECTION INTRAVENOUS; SUBCUTANEOUS
Qty: 0 | Refills: 0 | DISCHARGE
Start: 2019-06-19

## 2019-06-19 RX ORDER — ASPIRIN/CALCIUM CARB/MAGNESIUM 324 MG
1 TABLET ORAL
Qty: 0 | Refills: 0 | DISCHARGE
Start: 2019-06-19

## 2019-06-19 RX ADMIN — Medication 4: at 12:10

## 2019-06-19 RX ADMIN — Medication 37.5 MILLIGRAM(S): at 06:30

## 2019-06-19 RX ADMIN — BUMETANIDE 2 MILLIGRAM(S): 0.25 INJECTION INTRAMUSCULAR; INTRAVENOUS at 06:30

## 2019-06-19 RX ADMIN — Medication 81 MILLIGRAM(S): at 12:11

## 2019-06-19 RX ADMIN — Medication 1 TABLET(S): at 06:29

## 2019-06-19 RX ADMIN — Medication 22 UNIT(S): at 08:04

## 2019-06-19 RX ADMIN — PANTOPRAZOLE SODIUM 40 MILLIGRAM(S): 20 TABLET, DELAYED RELEASE ORAL at 06:30

## 2019-06-19 RX ADMIN — Medication 37.5 MILLIGRAM(S): at 14:21

## 2019-06-19 RX ADMIN — Medication 2: at 08:03

## 2019-06-19 RX ADMIN — Medication 22 UNIT(S): at 12:11

## 2019-06-19 RX ADMIN — TICAGRELOR 90 MILLIGRAM(S): 90 TABLET ORAL at 06:31

## 2019-06-19 RX ADMIN — ISOSORBIDE DINITRATE 10 MILLIGRAM(S): 5 TABLET ORAL at 06:30

## 2019-06-19 RX ADMIN — Medication 50 MICROGRAM(S): at 06:30

## 2019-06-19 RX ADMIN — HEPARIN SODIUM 5000 UNIT(S): 5000 INJECTION INTRAVENOUS; SUBCUTANEOUS at 06:30

## 2019-06-19 RX ADMIN — ISOSORBIDE DINITRATE 10 MILLIGRAM(S): 5 TABLET ORAL at 14:20

## 2019-06-19 NOTE — PROGRESS NOTE ADULT - SUBJECTIVE AND OBJECTIVE BOX
VITAL SIGNS    Telemetry:    Vital Signs Last 24 Hrs  T(C): 36.7 (19 @ 05:24), Max: 36.8 (19 @ 13:50)  T(F): 98 (19 @ 05:24), Max: 98.3 (19 @ 13:50)  HR: 96 (19 @ 05:24) (88 - 96)  BP: 98/57 (19 @ 05:24) (92/58 - 101/60)  RR: 18 (19 @ 05:24) (18 - 32)  SpO2: 100% (19 @ 05:24) (98% - 100%)             @ 07:01  -   @ 07:00  --------------------------------------------------------  IN: 920 mL / OUT: 250 mL / NET: 670 mL     @ 07:01  -   @ 10:04  --------------------------------------------------------  IN: 0 mL / OUT: 500 mL / NET: -500 mL       Daily     Daily Weight in k (2019 08:00)  Admit Wt: Drug Dosing Weight  Height (cm): 149.86 (2019 08:35)  Weight (kg): 44.8 (2019 08:35)  BMI (kg/m2): 19.9 (2019 08:35)  BSA (m2): 1.37 (2019 08:35)      CAPILLARY BLOOD GLUCOSE      POCT Blood Glucose.: 192 mg/dL (2019 07:44)  POCT Blood Glucose.: 130 mg/dL (2019 21:42)  POCT Blood Glucose.: 125 mg/dL (2019 16:42)  POCT Blood Glucose.: 130 mg/dL (2019 16:15)  POCT Blood Glucose.: 136 mg/dL (2019 12:08)          acetaminophen   Tablet .. 650 milliGRAM(s) Oral every 6 hours PRN  aspirin enteric coated 81 milliGRAM(s) Oral daily  atorvastatin 40 milliGRAM(s) Oral at bedtime  buMETAnide 2 milliGRAM(s) Oral every 12 hours  dextrose 40% Gel 15 Gram(s) Oral once PRN  dextrose 5%. 1000 milliLiter(s) IV Continuous <Continuous>  dextrose 50% Injectable 12.5 Gram(s) IV Push once  dextrose 50% Injectable 25 Gram(s) IV Push once  dextrose 50% Injectable 25 Gram(s) IV Push once  docusate sodium 100 milliGRAM(s) Oral two times a day  glucagon  Injectable 1 milliGRAM(s) IntraMuscular once PRN  heparin  Injectable 5000 Unit(s) SubCutaneous every 12 hours  hydrALAZINE 37.5 milliGRAM(s) Oral every 8 hours  insulin glargine Injectable (LANTUS) 48 Unit(s) SubCutaneous at bedtime  insulin lispro (HumaLOG) corrective regimen sliding scale   SubCutaneous three times a day before meals  insulin lispro (HumaLOG) corrective regimen sliding scale   SubCutaneous at bedtime  insulin lispro Injectable (HumaLOG) 22 Unit(s) SubCutaneous three times a day before meals  isosorbide   dinitrate Tablet (ISORDIL) 10 milliGRAM(s) Oral three times a day  levothyroxine 50 MICROGram(s) Oral daily  montelukast 10 milliGRAM(s) Oral daily  pantoprazole    Tablet 40 milliGRAM(s) Oral before breakfast  senna 2 Tablet(s) Oral at bedtime  sodium chloride 0.9%. 1000 milliLiter(s) IV Continuous <Continuous>  ticagrelor 90 milliGRAM(s) Oral two times a day      PHYSICAL EXAM    Subjective: "Hi.   Neurology: alert and oriented x 3, nonfocal, no gross deficits  CV : tele:  RSR  Sternal Wound :  CDI with dressing , Stable  Lungs: clear. RR easy, unlabored   Abdomen: soft, nontender, nondistended, positive bowel sounds, bowel movement   Neg N/V/D   :  pt voiding without difficulty   Extremities:   CUADRA; edema, neg calf tenderness.   PPP bilaterally      PW:  Chest tubes: VITAL SIGNS    Telemetry:  RSR 80-90   Vital Signs Last 24 Hrs  T(C): 36.7 (19 @ 05:24), Max: 36.8 (19 @ 13:50)  T(F): 98 (19 @ 05:24), Max: 98.3 (19 @ 13:50)  HR: 96 (19 @ 05:24) (88 - 96)  BP: 98/57 (19 @ 05:24) (92/58 - 101/60)  RR: 18 (19 @ 05:24) (18 - 32)  SpO2: 100% (19 @ 05:24) (98% - 100%)             @ 07:01  -   @ 07:00  --------------------------------------------------------  IN: 920 mL / OUT: 250 mL / NET: 670 mL     @ 07:01  -   @ 10:04  --------------------------------------------------------  IN: 0 mL / OUT: 500 mL / NET: -500 mL       Daily     Daily Weight in k (2019 08:00)  Admit Wt: Drug Dosing Weight  Height (cm): 149.86 (2019 08:35)  Weight (kg): 44.8 (2019 08:35)  BMI (kg/m2): 19.9 (2019 08:35)  BSA (m2): 1.37 (2019 08:35)      CAPILLARY BLOOD GLUCOSE      POCT Blood Glucose.: 192 mg/dL (2019 07:44)  POCT Blood Glucose.: 130 mg/dL (2019 21:42)  POCT Blood Glucose.: 125 mg/dL (2019 16:42)  POCT Blood Glucose.: 130 mg/dL (2019 16:15)  POCT Blood Glucose.: 136 mg/dL (2019 12:08)          acetaminophen   Tablet .. 650 milliGRAM(s) Oral every 6 hours PRN  aspirin enteric coated 81 milliGRAM(s) Oral daily  atorvastatin 40 milliGRAM(s) Oral at bedtime  buMETAnide 2 milliGRAM(s) Oral every 12 hours  dextrose 40% Gel 15 Gram(s) Oral once PRN  dextrose 5%. 1000 milliLiter(s) IV Continuous <Continuous>  dextrose 50% Injectable 12.5 Gram(s) IV Push once  dextrose 50% Injectable 25 Gram(s) IV Push once  dextrose 50% Injectable 25 Gram(s) IV Push once  docusate sodium 100 milliGRAM(s) Oral two times a day  glucagon  Injectable 1 milliGRAM(s) IntraMuscular once PRN  heparin  Injectable 5000 Unit(s) SubCutaneous every 12 hours  hydrALAZINE 37.5 milliGRAM(s) Oral every 8 hours  insulin glargine Injectable (LANTUS) 48 Unit(s) SubCutaneous at bedtime  insulin lispro (HumaLOG) corrective regimen sliding scale   SubCutaneous three times a day before meals  insulin lispro (HumaLOG) corrective regimen sliding scale   SubCutaneous at bedtime  insulin lispro Injectable (HumaLOG) 22 Unit(s) SubCutaneous three times a day before meals  isosorbide   dinitrate Tablet (ISORDIL) 10 milliGRAM(s) Oral three times a day  levothyroxine 50 MICROGram(s) Oral daily  montelukast 10 milliGRAM(s) Oral daily  pantoprazole    Tablet 40 milliGRAM(s) Oral before breakfast  senna 2 Tablet(s) Oral at bedtime  sodium chloride 0.9%. 1000 milliLiter(s) IV Continuous <Continuous>  ticagrelor 90 milliGRAM(s) Oral two times a day      PHYSICAL EXAM    Subjective: "I feel better."   Neurology: alert and oriented x 3, nonfocal, no gross deficits  CV : tele:  RSR 80-90   Lungs: clear diminished at the bases.  RR easy, unlabored   Abdomen: soft, nontender, nondistended, positive bowel sounds, + bowel movement   Neg N/V/D   :  pt voiding without difficulty   Extremities:   CUADRA; trace LE edema, neg calf tenderness.   PPP bilaterally; right groin cdi coco- neg hematoma

## 2019-06-19 NOTE — DISCHARGE NOTE NURSING/CASE MANAGEMENT/SOCIAL WORK - NSDCDPATPORTLINK_GEN_ALL_CORE
You can access the [facility] Patient Portal, offered by [facility], by registering with the following website: [email or web address]

## 2019-06-19 NOTE — DISCHARGE NOTE NURSING/CASE MANAGEMENT/SOCIAL WORK - NSDCPEPT PROEDHF_GEN_ALL_CORE
Activities as tolerated/Low salt diet/Report signs and symptoms to primary care provider/Monitor weight daily/Call primary care provider for follow up after discharge

## 2019-06-19 NOTE — PROGRESS NOTE ADULT - ASSESSMENT
Assessment  DMT2: 73y Female with DM T2 with hyperglycemia, on basal bolus insulin, FS running high, no hypoglycemic episode,  eating meals,  non compliant with low carb diet.  MVR/CAD: on medications, no chest pain, stable, planing surgery, monitored.  Hypothyroidism: On synthroid 50 mcg daily.  HTN: Controlled,  on antihypertensive medications.  CKD: Monitor labs/BMP,   Obesity: No strict exercise routines, not on any weight loss program, neither on low calorie diet.          Jillian Banks MD  Cell: [phone]  Office: [phone]

## 2019-06-19 NOTE — PROGRESS NOTE ADULT - PROBLEM SELECTOR PROBLEM 2
Acute renal failure superimposed on stage 3 chronic kidney disease, unspecified acute renal failure type
Diabetes mellitus, type 2
Coronary artery disease involving native heart without angina pectoris, unspecified vessel or lesion type
Diabetes mellitus, type 2
Type 2 diabetes mellitus without complication, unspecified whether long term insulin use
Coronary artery disease involving native heart without angina pectoris, unspecified vessel or lesion type
Type 2 diabetes mellitus without complication, unspecified whether long term insulin use
Diabetes mellitus, type 2

## 2019-06-19 NOTE — PROGRESS NOTE ADULT - PROBLEM SELECTOR PROBLEM 3
Urinary tract infection without hematuria, site unspecified Chronic kidney disease, unspecified CKD stage

## 2019-06-19 NOTE — PROGRESS NOTE ADULT - PROBLEM SELECTOR PROBLEM 1
S/P mitral valve clip implantation
Acute on chronic systolic heart failure
CHF exacerbation
S/P mitral valve clip implantation
Acute decompensated heart failure
CHF exacerbation
S/P mitral valve clip implantation
Type 2 diabetes mellitus without complication, unspecified whether long term insulin use
Type 2 diabetes mellitus without complication, unspecified whether long term insulin use
Acute decompensated heart failure
CHF exacerbation

## 2019-06-19 NOTE — PROGRESS NOTE ADULT - PROBLEM SELECTOR PLAN 3
continue bactrim for 5 days total   daily cbc renal following  cr stable 2.52 today  continue diuresis with bumex 2 mg po bid  strict I  & o's and daily weight

## 2019-06-19 NOTE — PROGRESS NOTE ADULT - SUBJECTIVE AND OBJECTIVE BOX
[facility]   MD RAHEEL SHAH MD RUORU WONG, PA    TEL:  OFFICE: [phone]  DR SANTOYO CELL: [phone]  JUSTEN KENDALL CELL: [phone]  DR. NGUYEN CELL: [phone]    RENAL FOLLOW UP NOTE  --------------------------------------------------------------------------------  HPI:      Pt seen and examined at bedside.       PAST HISTORY  --------------------------------------------------------------------------------  No significant changes to PMH, PSH, FHx, SHx, unless otherwise noted    ALLERGIES & MEDICATIONS  --------------------------------------------------------------------------------  Allergies    azithromycin (Hives; Pruritus)    Intolerances      Standing Inpatient Medications  aspirin enteric coated 81 milliGRAM(s) Oral daily  atorvastatin 40 milliGRAM(s) Oral at bedtime  buMETAnide 2 milliGRAM(s) Oral every 12 hours  dextrose 5%. 1000 milliLiter(s) IV Continuous <Continuous>  dextrose 50% Injectable 12.5 Gram(s) IV Push once  dextrose 50% Injectable 25 Gram(s) IV Push once  dextrose 50% Injectable 25 Gram(s) IV Push once  docusate sodium 100 milliGRAM(s) Oral two times a day  heparin  Injectable 5000 Unit(s) SubCutaneous every 12 hours  hydrALAZINE 37.5 milliGRAM(s) Oral every 8 hours  insulin glargine Injectable (LANTUS) 48 Unit(s) SubCutaneous at bedtime  insulin lispro (HumaLOG) corrective regimen sliding scale   SubCutaneous three times a day before meals  insulin lispro (HumaLOG) corrective regimen sliding scale   SubCutaneous at bedtime  insulin lispro Injectable (HumaLOG) 22 Unit(s) SubCutaneous three times a day before meals  isosorbide   dinitrate Tablet (ISORDIL) 10 milliGRAM(s) Oral three times a day  levothyroxine 50 MICROGram(s) Oral daily  montelukast 10 milliGRAM(s) Oral daily  pantoprazole    Tablet 40 milliGRAM(s) Oral before breakfast  senna 2 Tablet(s) Oral at bedtime  sodium chloride 0.9%. 1000 milliLiter(s) IV Continuous <Continuous>  ticagrelor 90 milliGRAM(s) Oral two times a day    PRN Inpatient Medications  acetaminophen   Tablet .. 650 milliGRAM(s) Oral every 6 hours PRN  dextrose 40% Gel 15 Gram(s) Oral once PRN  glucagon  Injectable 1 milliGRAM(s) IntraMuscular once PRN      REVIEW OF SYSTEMS  --------------------------------------------------------------------------------  General: no fever  CVS: no chest pain  RESP: improving  sob, no cough  ABD: no abdominal pain  : no dysuria,  MSK: no edema     VITALS/PHYSICAL EXAM  --------------------------------------------------------------------------------  T(C): 36.4 (06-19-19 @ 11:43), Max: 36.8 (06-18-19 @ 13:50)  HR: 63 (06-19-19 @ 11:43) (63 - 96)  BP: 94/54 (06-19-19 @ 11:43) (92/58 - 101/60)  RR: 18 (06-19-19 @ 11:43) (18 - 32)  SpO2: 97% (06-19-19 @ 11:43) (97% - 100%)  Wt(kg): --        06-18-19 @ 07:01  -  06-19-19 @ 07:00  --------------------------------------------------------  IN: 920 mL / OUT: 250 mL / NET: 670 mL    06-19-19 @ 07:01  -  06-19-19 @ 12:54  --------------------------------------------------------  IN: 620 mL / OUT: 800 mL / NET: -180 mL      Physical Exam:  	Gen: NAD  	HEENT: MMM  	Pulm: Crackles B/L  	CV: S1S2  	Abd: Soft, +BS  	Ext: No LE edema B/L                      Neuro: Awake   	Skin: Warm and Dry   	JUAN FRANCISCO cuellar    LABS/STUDIES  --------------------------------------------------------------------------------              8.4    9.4   >-----------<  330      [06-19-19 @ 07:20]              25.4     137  |  100  |  54  ----------------------------<  157      [06-19-19 @ 07:20]  4.7   |  21  |  2.54        Ca     10.4     [06-19-19 @ 07:20]            Creatinine Trend:  SCr 2.54 [06-19 @ 07:20]  SCr 2.52 [06-18 @ 06:05]  SCr 2.27 [06-17 @ 06:26]  SCr 2.03 [06-16 @ 07:08]  SCr 1.83 [06-15 @ 05:50]    Urinalysis - [06-14-19 @ 16:33]      Color Light Yellow / Appearance Clear / SG 1.016 / pH 5.5      Gluc 500 mg/dL / Ketone Negative  / Bili Negative / Urobili Negative       Blood Trace / Protein 30 mg/dL / Leuk Est Small / Nitrite Negative      RBC 10 / WBC 10 / Hyaline 2 / Gran  / Sq Epi  / Non Sq Epi 1 / Bacteria Negative      .4 (Ca --)      [04-25-19 @ 05:45]   --  PTH 43.55 (Ca --)      [09-10-18 @ 07:15]   --  PTH 36.60 (Ca --)      [09-08-18 @ 03:15]   --  Vitamin D (25OH) 25.9      [05-01-19 @ 04:00]  HbA1c 7.8      [06-01-19 @ 08:00]  TSH 0.75      [06-01-19 @ 08:00]  Lipid: chol 148, , HDL 35,       [06-01-19 @ 08:00]

## 2019-06-19 NOTE — PROGRESS NOTE ADULT - PROBLEM SELECTOR PLAN 1
Will increase Lantus to 50 units at bed time.  Will increase Humalog to 24 units before each meal in addition to Humalog correction scale coverage.  Patient counseled for compliance with consistent low carb diet.

## 2019-06-19 NOTE — PROGRESS NOTE ADULT - PROBLEM SELECTOR PROBLEM 4
Coronary artery disease involving coronary bypass graft of native heart without angina pectoris
Hyperlipidemia
Chronic kidney disease, unspecified CKD stage
Hyperlipidemia
Urinary tract infection without hematuria, site unspecified
Hyperlipidemia

## 2019-06-19 NOTE — PROGRESS NOTE ADULT - SUBJECTIVE AND OBJECTIVE BOX
Chief complaint  Patient is a 73y old  Female who presents with a chief complaint of ADHF, eval for mitral clip (19 Jun 2019 12:54)   Review of systems  Patient in bed, looks comfortable, no fever, no hypoglycemia.    Labs and Fingersticks  CAPILLARY BLOOD GLUCOSE      POCT Blood Glucose.: 226 mg/dL (19 Jun 2019 11:42)  POCT Blood Glucose.: 192 mg/dL (19 Jun 2019 07:44)  POCT Blood Glucose.: 130 mg/dL (18 Jun 2019 21:42)  POCT Blood Glucose.: 125 mg/dL (18 Jun 2019 16:42)  POCT Blood Glucose.: 130 mg/dL (18 Jun 2019 16:15)      Anion Gap, Serum: 16 (06-19 @ 07:20)  Anion Gap, Serum: 15 (06-18 @ 06:05)      Calcium, Total Serum: 10.4 (06-19 @ 07:20)  Calcium, Total Serum: 9.4 (06-18 @ 06:05)          06-19    137  |  100  |  54<H>  ----------------------------<  157<H>  4.7   |  21<L>  |  2.54<H>    Ca    10.4      19 Jun 2019 07:20                          8.4    9.4   )-----------( 330      ( 19 Jun 2019 07:20 )             25.4     Medications  MEDICATIONS  (STANDING):  aspirin enteric coated 81 milliGRAM(s) Oral daily  atorvastatin 40 milliGRAM(s) Oral at bedtime  buMETAnide 2 milliGRAM(s) Oral every 12 hours  dextrose 5%. 1000 milliLiter(s) (50 mL/Hr) IV Continuous <Continuous>  dextrose 50% Injectable 12.5 Gram(s) IV Push once  dextrose 50% Injectable 25 Gram(s) IV Push once  dextrose 50% Injectable 25 Gram(s) IV Push once  docusate sodium 100 milliGRAM(s) Oral two times a day  heparin  Injectable 5000 Unit(s) SubCutaneous every 12 hours  hydrALAZINE 37.5 milliGRAM(s) Oral every 8 hours  insulin glargine Injectable (LANTUS) 50 Unit(s) SubCutaneous at bedtime  insulin lispro (HumaLOG) corrective regimen sliding scale   SubCutaneous three times a day before meals  insulin lispro (HumaLOG) corrective regimen sliding scale   SubCutaneous at bedtime  insulin lispro Injectable (HumaLOG) 24 Unit(s) SubCutaneous three times a day before meals  isosorbide   dinitrate Tablet (ISORDIL) 10 milliGRAM(s) Oral three times a day  levothyroxine 50 MICROGram(s) Oral daily  montelukast 10 milliGRAM(s) Oral daily  pantoprazole    Tablet 40 milliGRAM(s) Oral before breakfast  senna 2 Tablet(s) Oral at bedtime  sodium chloride 0.9%. 1000 milliLiter(s) (10 mL/Hr) IV Continuous <Continuous>  ticagrelor 90 milliGRAM(s) Oral two times a day      Physical Exam  General: Patient comfortable in bed  Vital Signs Last 12 Hrs  T(F): 97.5 (06-19-19 @ 11:43), Max: 98 (06-19-19 @ 05:24)  HR: 63 (06-19-19 @ 11:43) (63 - 96)  BP: 94/54 (06-19-19 @ 11:43) (94/54 - 98/57)  BP(mean): --  RR: 18 (06-19-19 @ 11:43) (18 - 18)  SpO2: 97% (06-19-19 @ 11:43) (97% - 100%)  Neck: No palpable thyroid nodules.  CVS: S1S2, No murmurs  Respiratory: No wheezing, no crepitations  GI: Abdomen soft, bowel sounds positive  Musculoskeletal:  edema lower extremities.   Skin: No skin rashes, no ecchymosis    Diagnostics

## 2019-06-19 NOTE — PROGRESS NOTE ADULT - REASON FOR ADMISSION
ADHF, eval for mitral clip
ADHF, s/p  mitral clip 6/13
ADHF, eval for mitral clip
ADHF, s/p mitral clip 6/13
ADHF, eval for mitral clip
sp mitral clip
ADHF, eval for mitral clip
<<-----Click here for Discharge Medication Review

## 2019-06-19 NOTE — PROGRESS NOTE ADULT - ASSESSMENT
73 F PMH HTN, T2DM, CAD s/p CABG (LIMA to LAD, SVG to OM, SVG to PDA at [facility] 2014), HFrEF (LVEF 25%) severe MR, severe pulm HTN, hypothyroidism, [facility] on 6/1/19 with sob, increased work of breathing, f/w ADHF and severe MR, cardiorenal syndrome with duncan on ckd, now s/p IV diuresis and transferred over for eval for mitral clip procedure in the setting of recurrent readmissions for ADHF exacerbation.      6/13 s/p mitral clip, post op requiring vasopressors for hypotension  6/14 VSS, s/p 1 PRBC, transferred to [facility]. Tolerating current medical regiment. Cont diuretics.    6/15 VSS; continue diuresis; xanax initiated for anxiety; ck postop Echo; continue bactrim for a UTI; endo consulted today for uncontrolled dm  6/16 VSS: Bumex changed to PO 2 mg BID, aldactone d/c'd. SCr 2 today, renal recs appreciated. Heart failure consulted for medical optimization. Colchicine started for Right foot gout.  6/17 VSS: cr 2.2;  continue diuretics as per CHF and renal; increase hydralazine 25 mg po tid;  ck pa/ lateral chest xray; encourage ambulation; o2 sats  94% RA   6/18     OOB to chair   creat up to 2.52   conversation with renal   stopr evening dose bumex and restart in am bumex 2 mg  BID  is neg 760 cc in 24 hrs 73 F PMH HTN, T2DM, CAD s/p CABG (LIMA to LAD, SVG to OM, SVG to PDA at [facility] 2014), HFrEF (LVEF 25%) severe MR, severe pulm HTN, hypothyroidism, presents from [facility] to [facility] initially on 6/1/19 with sob, increased work of breathing, f/w ADHF and severe MR, cardiorenal syndrome with duncan on ckd, now s/p IV diuresis and transferred over for eval for mitral clip procedure in the setting of recurrent readmissions for ADHF exacerbation.      6/13 s/p mitral clip, post op requiring vasopressors for hypotension  6/14 VSS, s/p 1 PRBC, transferred to [facility]. Tolerating current medical regiment. Cont diuretics.    6/15 VSS; continue diuresis; xanax initiated for anxiety; ck postop Echo; continue bactrim for a UTI; endo consulted today for uncontrolled dm  6/16 VSS: Bumex changed to PO 2 mg BID, aldactone d/c'd. SCr 2 today, renal recs appreciated. Heart failure consulted for medical optimization. Colchicine started for Right foot gout.  6/17 VSS: cr 2.2;  continue diuretics as per CHF and renal; increase hydralazine 25 mg po tid;  ck pa/ lateral chest xray; encourage ambulation; o2 sats  94% RA   6/18     OOB to chair   creat up to 2.52   conversation with renal   stopr evening dose bumex and restart in am bumex 2 mg  BID  is neg 760 cc in 24 hrs  6/19 VSS ; bun/cr stable 54/2/5 stable; continue diuresis   pt stable for discharge to rehab as per CHF team and Dr. Newman

## 2019-06-19 NOTE — PROGRESS NOTE ADULT - PROBLEM SELECTOR PLAN 1
TTE 6/15 - minimal MR - severe LV dysfunction   continue asa, statin, brillinta/ increase hydralazine 25 mg po q  Discharge planning- rehab wed if cr stable continue postop care  continue bumex 2 mg po bid   continue asa, statin, brillinta/ hydralazine 37.5 mg po tid  continue isordil 10 mg po q8   TTE 6/15 - minimal MR - severe LV dysfunction   Discharge planning- rehab wed cr stable as per Dr. Newman and chf team

## 2019-06-19 NOTE — PROGRESS NOTE ADULT - ASSESSMENT
74 y/o female with PMHx of HTN, DM, CAD s/p CABG (LIMA to LAD, SVG to OM, SVG to PDA at [facility] 2014), HFrEF (LVEF 25%) severe MR, severe pulm HTN, hypothyroidism, admitted to [facility] for SOB, now transferred to [facility] for [facility] Clip Eval    MERVIN  Likely sec to CHF/renal vasocongestion   Renal function fluctuates slightly likely sec to CHF. -   Monitor bmp  Continue Diuretics per cardio  off Spirinolactone   Avoid nephrotoxic agents      CKD stage 3  baseline cr 1.5-1.8  likely sec to HTN/DM/chf  Monitor renal function     SOB  likely sec to HF   Monitor daily weight and i/o  Diuretics per cardio  Patient with severe MR, s/p  Mitral Clip on 6/13

## 2019-06-26 ENCOUNTER — APPOINTMENT (OUTPATIENT)
Dept: CARDIOLOGY | Facility: CLINIC | Age: 74
End: 2019-06-26

## 2019-06-27 RX ORDER — 70%ISOPROPYL ALCOHOL 0.7 ML/ML
70 SWAB TOPICAL
Qty: 100 | Refills: 0 | Status: COMPLETED | COMMUNITY
Start: 2018-10-27 | End: 2019-06-27

## 2019-06-27 RX ORDER — SENNOSIDES 8.6 MG TABLETS 8.6 MG/1
TABLET ORAL AT BEDTIME
Refills: 0 | Status: ACTIVE | COMMUNITY

## 2019-06-27 RX ORDER — NEPAFENAC 3 MG/ML
0.3 SUSPENSION/ DROPS OPHTHALMIC
Qty: 5 | Refills: 0 | Status: COMPLETED | COMMUNITY
Start: 2018-10-30 | End: 2019-06-27

## 2019-06-27 RX ORDER — INSULIN DEGLUDEC INJECTION 100 U/ML
100 INJECTION, SOLUTION SUBCUTANEOUS
Qty: 30 | Refills: 0 | Status: COMPLETED | COMMUNITY
Start: 2018-12-06 | End: 2019-06-27

## 2019-06-27 RX ORDER — PREDNISOLONE ACETATE 10 MG/ML
1 SUSPENSION/ DROPS OPHTHALMIC
Qty: 15 | Refills: 0 | Status: COMPLETED | COMMUNITY
Start: 2018-10-30 | End: 2019-06-27

## 2019-06-27 RX ORDER — MOXIFLOXACIN OPHTHALMIC 5 MG/ML
0.5 SOLUTION/ DROPS OPHTHALMIC
Qty: 6 | Refills: 0 | Status: COMPLETED | COMMUNITY
Start: 2018-10-30 | End: 2019-06-27

## 2019-06-27 RX ORDER — LEVOTHYROXINE SODIUM 0.05 MG/1
50 TABLET ORAL DAILY
Qty: 90 | Refills: 0 | Status: ACTIVE | COMMUNITY

## 2019-06-27 RX ORDER — GABAPENTIN 100 MG/1
100 CAPSULE ORAL
Qty: 180 | Refills: 0 | Status: COMPLETED | COMMUNITY
Start: 2019-02-27 | End: 2019-06-27

## 2019-06-27 RX ORDER — PEN NEEDLE, DIABETIC 29 G X1/2"
32G X 4 MM NEEDLE, DISPOSABLE MISCELLANEOUS
Qty: 400 | Refills: 0 | Status: COMPLETED | COMMUNITY
Start: 2019-02-08 | End: 2019-06-27

## 2019-06-27 RX ORDER — ACETAMINOPHEN 500 MG/1
500 TABLET, FILM COATED ORAL
Refills: 0 | Status: COMPLETED | COMMUNITY
Start: 2019-03-08 | End: 2019-06-27

## 2019-06-27 RX ORDER — RANITIDINE 150 MG/1
150 TABLET ORAL
Qty: 60 | Refills: 0 | Status: COMPLETED | COMMUNITY
Start: 2018-10-21 | End: 2019-06-27

## 2019-06-27 RX ORDER — MONTELUKAST 10 MG/1
10 TABLET, FILM COATED ORAL
Qty: 30 | Refills: 0 | Status: COMPLETED | COMMUNITY
Start: 2018-10-21 | End: 2019-06-27

## 2019-06-27 RX ORDER — ALENDRONATE SODIUM 70 MG/1
70 TABLET ORAL
Refills: 0 | Status: COMPLETED | COMMUNITY
Start: 2018-12-03 | End: 2019-06-27

## 2019-07-01 ENCOUNTER — OUTPATIENT (OUTPATIENT)
Dept: OUTPATIENT SERVICES | Facility: HOSPITAL | Age: 74
LOS: 1 days | End: 2019-07-01

## 2019-07-01 ENCOUNTER — APPOINTMENT (OUTPATIENT)
Dept: CARDIOTHORACIC SURGERY | Facility: CLINIC | Age: 74
End: 2019-07-01
Payer: MEDICAID

## 2019-07-01 VITALS — BODY MASS INDEX: 25.82 KG/M2 | HEIGHT: 58 IN | WEIGHT: 123 LBS

## 2019-07-01 VITALS — SYSTOLIC BLOOD PRESSURE: 108 MMHG | DIASTOLIC BLOOD PRESSURE: 72 MMHG

## 2019-07-01 VITALS
DIASTOLIC BLOOD PRESSURE: 59 MMHG | SYSTOLIC BLOOD PRESSURE: 92 MMHG | OXYGEN SATURATION: 100 % | RESPIRATION RATE: 16 BRPM | HEART RATE: 88 BPM

## 2019-07-01 DIAGNOSIS — Z95.1 PRESENCE OF AORTOCORONARY BYPASS GRAFT: Chronic | ICD-10-CM

## 2019-07-01 DIAGNOSIS — Z09 ENCOUNTER FOR FOLLOW-UP EXAMINATION AFTER COMPLETED TREATMENT FOR CONDITIONS OTHER THAN MALIGNANT NEOPLASM: ICD-10-CM

## 2019-07-01 PROCEDURE — 99024 POSTOP FOLLOW-UP VISIT: CPT

## 2019-07-01 RX ORDER — HEPARIN SODIUM,BOVINE 5000/ML
5000 VIAL (ML) INJECTION
Refills: 0 | Status: COMPLETED | COMMUNITY
End: 2019-07-01

## 2019-07-01 RX ORDER — PANTOPRAZOLE 40 MG/1
40 TABLET, DELAYED RELEASE ORAL DAILY
Qty: 30 | Refills: 0 | Status: ACTIVE | COMMUNITY
Start: 2019-07-01

## 2019-07-05 ENCOUNTER — INPATIENT (INPATIENT)
Facility: HOSPITAL | Age: 74
LOS: 61 days | Discharge: HOME | DRG: 291 | End: 2019-09-05
Attending: INTERNAL MEDICINE | Admitting: INTERNAL MEDICINE
Payer: MEDICARE

## 2019-07-05 VITALS
HEIGHT: 62 IN | HEART RATE: 108 BPM | DIASTOLIC BLOOD PRESSURE: 75 MMHG | SYSTOLIC BLOOD PRESSURE: 121 MMHG | WEIGHT: 139.99 LBS | RESPIRATION RATE: 22 BRPM | OXYGEN SATURATION: 100 % | TEMPERATURE: 98 F

## 2019-07-05 DIAGNOSIS — R06.02 SHORTNESS OF BREATH: ICD-10-CM

## 2019-07-05 DIAGNOSIS — Z95.1 PRESENCE OF AORTOCORONARY BYPASS GRAFT: Chronic | ICD-10-CM

## 2019-07-05 DIAGNOSIS — E87.70 FLUID OVERLOAD, UNSPECIFIED: ICD-10-CM

## 2019-07-05 LAB
ALBUMIN SERPL ELPH-MCNC: 3.8 G/DL — SIGNIFICANT CHANGE UP (ref 3.3–5)
ALBUMIN SERPL ELPH-MCNC: 3.8 G/DL — SIGNIFICANT CHANGE UP (ref 3.3–5)
ALP SERPL-CCNC: 154 U/L — HIGH (ref 40–120)
ALP SERPL-CCNC: 165 U/L — HIGH (ref 40–120)
ALT FLD-CCNC: 30 U/L — SIGNIFICANT CHANGE UP (ref 10–45)
ALT FLD-CCNC: 34 U/L — SIGNIFICANT CHANGE UP (ref 10–45)
ANION GAP SERPL CALC-SCNC: 14 MMOL/L — SIGNIFICANT CHANGE UP (ref 5–17)
ANION GAP SERPL CALC-SCNC: 16 MMOL/L — SIGNIFICANT CHANGE UP (ref 5–17)
APTT BLD: 35.6 SEC — SIGNIFICANT CHANGE UP (ref 27.5–36.3)
APTT BLD: 37.1 SEC — HIGH (ref 27.5–36.3)
AST SERPL-CCNC: 21 U/L — SIGNIFICANT CHANGE UP (ref 10–40)
AST SERPL-CCNC: 26 U/L — SIGNIFICANT CHANGE UP (ref 10–40)
BASE EXCESS BLDV CALC-SCNC: -0.9 MMOL/L — SIGNIFICANT CHANGE UP (ref -2–2)
BASOPHILS # BLD AUTO: 0 K/UL — SIGNIFICANT CHANGE UP (ref 0–0.2)
BASOPHILS # BLD AUTO: 0 K/UL — SIGNIFICANT CHANGE UP (ref 0–0.2)
BASOPHILS NFR BLD AUTO: 0 % — SIGNIFICANT CHANGE UP (ref 0–2)
BASOPHILS NFR BLD AUTO: 0.3 % — SIGNIFICANT CHANGE UP (ref 0–2)
BILIRUB SERPL-MCNC: 0.4 MG/DL — SIGNIFICANT CHANGE UP (ref 0.2–1.2)
BILIRUB SERPL-MCNC: 0.4 MG/DL — SIGNIFICANT CHANGE UP (ref 0.2–1.2)
BLD GP AB SCN SERPL QL: NEGATIVE — SIGNIFICANT CHANGE UP
BUN SERPL-MCNC: 41 MG/DL — HIGH (ref 7–23)
BUN SERPL-MCNC: 42 MG/DL — HIGH (ref 7–23)
CA-I SERPL-SCNC: 1.2 MMOL/L — SIGNIFICANT CHANGE UP (ref 1.12–1.3)
CALCIUM SERPL-MCNC: 9.4 MG/DL — SIGNIFICANT CHANGE UP (ref 8.4–10.5)
CALCIUM SERPL-MCNC: 9.6 MG/DL — SIGNIFICANT CHANGE UP (ref 8.4–10.5)
CHLORIDE BLDV-SCNC: 108 MMOL/L — SIGNIFICANT CHANGE UP (ref 96–108)
CHLORIDE SERPL-SCNC: 104 MMOL/L — SIGNIFICANT CHANGE UP (ref 96–108)
CHLORIDE SERPL-SCNC: 105 MMOL/L — SIGNIFICANT CHANGE UP (ref 96–108)
CK MB CFR SERPL CALC: 5.5 NG/ML — HIGH (ref 0–3.8)
CK SERPL-CCNC: 65 U/L — SIGNIFICANT CHANGE UP (ref 25–170)
CO2 BLDV-SCNC: 22 MMOL/L — SIGNIFICANT CHANGE UP (ref 22–30)
CO2 SERPL-SCNC: 19 MMOL/L — LOW (ref 22–31)
CO2 SERPL-SCNC: 23 MMOL/L — SIGNIFICANT CHANGE UP (ref 22–31)
CREAT SERPL-MCNC: 1.72 MG/DL — HIGH (ref 0.5–1.3)
CREAT SERPL-MCNC: 1.89 MG/DL — HIGH (ref 0.5–1.3)
EOSINOPHIL # BLD AUTO: 0.1 K/UL — SIGNIFICANT CHANGE UP (ref 0–0.5)
EOSINOPHIL # BLD AUTO: 0.2 K/UL — SIGNIFICANT CHANGE UP (ref 0–0.5)
EOSINOPHIL NFR BLD AUTO: 0.8 % — SIGNIFICANT CHANGE UP (ref 0–6)
EOSINOPHIL NFR BLD AUTO: 1.6 % — SIGNIFICANT CHANGE UP (ref 0–6)
FERRITIN SERPL-MCNC: 130 NG/ML — SIGNIFICANT CHANGE UP (ref 15–150)
GAS PNL BLDV: 141 MMOL/L — SIGNIFICANT CHANGE UP (ref 135–145)
GAS PNL BLDV: SIGNIFICANT CHANGE UP
GAS PNL BLDV: SIGNIFICANT CHANGE UP
GLUCOSE BLDV-MCNC: 115 MG/DL — HIGH (ref 70–99)
GLUCOSE SERPL-MCNC: 129 MG/DL — HIGH (ref 70–99)
GLUCOSE SERPL-MCNC: 177 MG/DL — HIGH (ref 70–99)
HBA1C BLD-MCNC: 7.4 % — HIGH (ref 4–5.6)
HCO3 BLDV-SCNC: 22 MMOL/L — SIGNIFICANT CHANGE UP (ref 21–29)
HCT VFR BLD CALC: 27 % — LOW (ref 34.5–45)
HCT VFR BLD CALC: 28.5 % — LOW (ref 34.5–45)
HCT VFR BLDA CALC: 26 % — LOW (ref 39–50)
HGB BLD CALC-MCNC: 8.5 G/DL — LOW (ref 11.5–15.5)
HGB BLD-MCNC: 8.7 G/DL — LOW (ref 11.5–15.5)
HGB BLD-MCNC: 9.3 G/DL — LOW (ref 11.5–15.5)
INR BLD: 1.09 RATIO — SIGNIFICANT CHANGE UP (ref 0.88–1.16)
INR BLD: 1.13 RATIO — SIGNIFICANT CHANGE UP (ref 0.88–1.16)
IRON SATN MFR SERPL: 12 % — LOW (ref 14–50)
IRON SATN MFR SERPL: 42 UG/DL — SIGNIFICANT CHANGE UP (ref 30–160)
LACTATE BLDV-MCNC: 2.1 MMOL/L — HIGH (ref 0.7–2)
LACTATE SERPL-SCNC: 1.7 MMOL/L — SIGNIFICANT CHANGE UP (ref 0.7–2)
LYMPHOCYTES # BLD AUTO: 0.5 K/UL — LOW (ref 1–3.3)
LYMPHOCYTES # BLD AUTO: 0.9 K/UL — LOW (ref 1–3.3)
LYMPHOCYTES # BLD AUTO: 10.4 % — LOW (ref 13–44)
LYMPHOCYTES # BLD AUTO: 3.9 % — LOW (ref 13–44)
MAGNESIUM SERPL-MCNC: 2.1 MG/DL — SIGNIFICANT CHANGE UP (ref 1.6–2.6)
MCHC RBC-ENTMCNC: 29 PG — SIGNIFICANT CHANGE UP (ref 27–34)
MCHC RBC-ENTMCNC: 29.1 PG — SIGNIFICANT CHANGE UP (ref 27–34)
MCHC RBC-ENTMCNC: 32.1 GM/DL — SIGNIFICANT CHANGE UP (ref 32–36)
MCHC RBC-ENTMCNC: 32.5 GM/DL — SIGNIFICANT CHANGE UP (ref 32–36)
MCV RBC AUTO: 89.3 FL — SIGNIFICANT CHANGE UP (ref 80–100)
MCV RBC AUTO: 90.4 FL — SIGNIFICANT CHANGE UP (ref 80–100)
MONOCYTES # BLD AUTO: 0.5 K/UL — SIGNIFICANT CHANGE UP (ref 0–0.9)
MONOCYTES # BLD AUTO: 0.6 K/UL — SIGNIFICANT CHANGE UP (ref 0–0.9)
MONOCYTES NFR BLD AUTO: 4.7 % — SIGNIFICANT CHANGE UP (ref 2–14)
MONOCYTES NFR BLD AUTO: 5.7 % — SIGNIFICANT CHANGE UP (ref 2–14)
NEUTROPHILS # BLD AUTO: 12.2 K/UL — HIGH (ref 1.8–7.4)
NEUTROPHILS # BLD AUTO: 7.5 K/UL — HIGH (ref 1.8–7.4)
NEUTROPHILS NFR BLD AUTO: 81.9 % — HIGH (ref 43–77)
NEUTROPHILS NFR BLD AUTO: 90.6 % — HIGH (ref 43–77)
NT-PROBNP SERPL-SCNC: 6626 PG/ML — HIGH (ref 0–300)
PCO2 BLDV: 29 MMHG — LOW (ref 35–50)
PH BLDV: 7.49 — HIGH (ref 7.35–7.45)
PHOSPHATE SERPL-MCNC: 3.8 MG/DL — SIGNIFICANT CHANGE UP (ref 2.5–4.5)
PLATELET # BLD AUTO: 388 K/UL — SIGNIFICANT CHANGE UP (ref 150–400)
PLATELET # BLD AUTO: 439 K/UL — HIGH (ref 150–400)
PO2 BLDV: 61 MMHG — HIGH (ref 25–45)
POTASSIUM BLDV-SCNC: 4.7 MMOL/L — SIGNIFICANT CHANGE UP (ref 3.5–5.3)
POTASSIUM SERPL-MCNC: 4.3 MMOL/L — SIGNIFICANT CHANGE UP (ref 3.5–5.3)
POTASSIUM SERPL-MCNC: 4.5 MMOL/L — SIGNIFICANT CHANGE UP (ref 3.5–5.3)
POTASSIUM SERPL-SCNC: 4.3 MMOL/L — SIGNIFICANT CHANGE UP (ref 3.5–5.3)
POTASSIUM SERPL-SCNC: 4.5 MMOL/L — SIGNIFICANT CHANGE UP (ref 3.5–5.3)
PROT SERPL-MCNC: 7.9 G/DL — SIGNIFICANT CHANGE UP (ref 6–8.3)
PROT SERPL-MCNC: 8.1 G/DL — SIGNIFICANT CHANGE UP (ref 6–8.3)
PROTHROM AB SERPL-ACNC: 12.5 SEC — SIGNIFICANT CHANGE UP (ref 10–12.9)
PROTHROM AB SERPL-ACNC: 12.9 SEC — SIGNIFICANT CHANGE UP (ref 10–12.9)
RBC # BLD: 2.99 M/UL — LOW (ref 3.8–5.2)
RBC # BLD: 3.19 M/UL — LOW (ref 3.8–5.2)
RBC # FLD: 18.1 % — HIGH (ref 10.3–14.5)
RBC # FLD: 18.2 % — HIGH (ref 10.3–14.5)
RH IG SCN BLD-IMP: POSITIVE — SIGNIFICANT CHANGE UP
SAO2 % BLDV: 91 % — HIGH (ref 67–88)
SODIUM SERPL-SCNC: 139 MMOL/L — SIGNIFICANT CHANGE UP (ref 135–145)
SODIUM SERPL-SCNC: 142 MMOL/L — SIGNIFICANT CHANGE UP (ref 135–145)
TIBC SERPL-MCNC: 348 UG/DL — SIGNIFICANT CHANGE UP (ref 220–430)
TROPONIN T, HIGH SENSITIVITY RESULT: 292 NG/L — HIGH (ref 0–51)
TROPONIN T, HIGH SENSITIVITY RESULT: 330 NG/L — HIGH (ref 0–51)
TROPONIN T, HIGH SENSITIVITY RESULT: 412 NG/L — HIGH (ref 0–51)
TSH SERPL-MCNC: 2 UIU/ML — SIGNIFICANT CHANGE UP (ref 0.27–4.2)
UIBC SERPL-MCNC: 306 UG/DL — SIGNIFICANT CHANGE UP (ref 110–370)
WBC # BLD: 13.5 K/UL — HIGH (ref 3.8–10.5)
WBC # BLD: 9.1 K/UL — SIGNIFICANT CHANGE UP (ref 3.8–10.5)
WBC # FLD AUTO: 13.5 K/UL — HIGH (ref 3.8–10.5)
WBC # FLD AUTO: 9.1 K/UL — SIGNIFICANT CHANGE UP (ref 3.8–10.5)

## 2019-07-05 PROCEDURE — 99291 CRITICAL CARE FIRST HOUR: CPT | Mod: GC

## 2019-07-05 PROCEDURE — 93306 TTE W/DOPPLER COMPLETE: CPT | Mod: 26

## 2019-07-05 PROCEDURE — ZZZZZ: CPT

## 2019-07-05 PROCEDURE — 93010 ELECTROCARDIOGRAM REPORT: CPT

## 2019-07-05 PROCEDURE — 99223 1ST HOSP IP/OBS HIGH 75: CPT | Mod: GC

## 2019-07-05 PROCEDURE — 71045 X-RAY EXAM CHEST 1 VIEW: CPT | Mod: 26

## 2019-07-05 RX ORDER — DEXTROSE 50 % IN WATER 50 %
15 SYRINGE (ML) INTRAVENOUS ONCE
Refills: 0 | Status: DISCONTINUED | OUTPATIENT
Start: 2019-07-05 | End: 2019-09-05

## 2019-07-05 RX ORDER — DOCUSATE SODIUM 100 MG
100 CAPSULE ORAL
Refills: 0 | Status: DISCONTINUED | OUTPATIENT
Start: 2019-07-05 | End: 2019-09-05

## 2019-07-05 RX ORDER — INSULIN LISPRO 100/ML
10 VIAL (ML) SUBCUTANEOUS
Refills: 0 | Status: DISCONTINUED | OUTPATIENT
Start: 2019-07-05 | End: 2019-07-09

## 2019-07-05 RX ORDER — ACETAMINOPHEN 500 MG
650 TABLET ORAL EVERY 6 HOURS
Refills: 0 | Status: DISCONTINUED | OUTPATIENT
Start: 2019-07-05 | End: 2019-09-05

## 2019-07-05 RX ORDER — ASPIRIN/CALCIUM CARB/MAGNESIUM 324 MG
81 TABLET ORAL DAILY
Refills: 0 | Status: DISCONTINUED | OUTPATIENT
Start: 2019-07-05 | End: 2019-09-05

## 2019-07-05 RX ORDER — DEXTROSE 50 % IN WATER 50 %
12.5 SYRINGE (ML) INTRAVENOUS ONCE
Refills: 0 | Status: DISCONTINUED | OUTPATIENT
Start: 2019-07-05 | End: 2019-09-05

## 2019-07-05 RX ORDER — PANTOPRAZOLE SODIUM 20 MG/1
40 TABLET, DELAYED RELEASE ORAL
Refills: 0 | Status: DISCONTINUED | OUTPATIENT
Start: 2019-07-05 | End: 2019-09-05

## 2019-07-05 RX ORDER — ASPIRIN/CALCIUM CARB/MAGNESIUM 324 MG
162 TABLET ORAL DAILY
Refills: 0 | Status: DISCONTINUED | OUTPATIENT
Start: 2019-07-05 | End: 2019-07-05

## 2019-07-05 RX ORDER — ASPIRIN/CALCIUM CARB/MAGNESIUM 324 MG
162 TABLET ORAL ONCE
Refills: 0 | Status: COMPLETED | OUTPATIENT
Start: 2019-07-05 | End: 2019-07-05

## 2019-07-05 RX ORDER — HEPARIN SODIUM 5000 [USP'U]/ML
5000 INJECTION INTRAVENOUS; SUBCUTANEOUS EVERY 8 HOURS
Refills: 0 | Status: DISCONTINUED | OUTPATIENT
Start: 2019-07-05 | End: 2019-07-13

## 2019-07-05 RX ORDER — ACETAMINOPHEN 500 MG
650 TABLET ORAL ONCE
Refills: 0 | Status: COMPLETED | OUTPATIENT
Start: 2019-07-05 | End: 2019-07-05

## 2019-07-05 RX ORDER — SENNA PLUS 8.6 MG/1
2 TABLET ORAL AT BEDTIME
Refills: 0 | Status: DISCONTINUED | OUTPATIENT
Start: 2019-07-05 | End: 2019-09-05

## 2019-07-05 RX ORDER — FUROSEMIDE 40 MG
40 TABLET ORAL ONCE
Refills: 0 | Status: COMPLETED | OUTPATIENT
Start: 2019-07-05 | End: 2019-07-05

## 2019-07-05 RX ORDER — CHLORHEXIDINE GLUCONATE 213 G/1000ML
1 SOLUTION TOPICAL DAILY
Refills: 0 | Status: DISCONTINUED | OUTPATIENT
Start: 2019-07-05 | End: 2019-09-05

## 2019-07-05 RX ORDER — GLUCAGON INJECTION, SOLUTION 0.5 MG/.1ML
1 INJECTION, SOLUTION SUBCUTANEOUS ONCE
Refills: 0 | Status: DISCONTINUED | OUTPATIENT
Start: 2019-07-05 | End: 2019-09-05

## 2019-07-05 RX ORDER — LEVOTHYROXINE SODIUM 125 MCG
50 TABLET ORAL DAILY
Refills: 0 | Status: DISCONTINUED | OUTPATIENT
Start: 2019-07-05 | End: 2019-09-05

## 2019-07-05 RX ORDER — MONTELUKAST 4 MG/1
10 TABLET, CHEWABLE ORAL DAILY
Refills: 0 | Status: DISCONTINUED | OUTPATIENT
Start: 2019-07-05 | End: 2019-09-05

## 2019-07-05 RX ORDER — INSULIN LISPRO 100/ML
VIAL (ML) SUBCUTANEOUS
Refills: 0 | Status: DISCONTINUED | OUTPATIENT
Start: 2019-07-05 | End: 2019-08-19

## 2019-07-05 RX ORDER — INSULIN GLARGINE 100 [IU]/ML
25 INJECTION, SOLUTION SUBCUTANEOUS EVERY MORNING
Refills: 0 | Status: DISCONTINUED | OUTPATIENT
Start: 2019-07-05 | End: 2019-07-05

## 2019-07-05 RX ORDER — SODIUM CHLORIDE 9 MG/ML
1000 INJECTION, SOLUTION INTRAVENOUS
Refills: 0 | Status: DISCONTINUED | OUTPATIENT
Start: 2019-07-05 | End: 2019-09-05

## 2019-07-05 RX ORDER — CHLORHEXIDINE GLUCONATE 213 G/1000ML
1 SOLUTION TOPICAL
Refills: 0 | Status: DISCONTINUED | OUTPATIENT
Start: 2019-07-05 | End: 2019-07-05

## 2019-07-05 RX ORDER — INSULIN LISPRO 100/ML
VIAL (ML) SUBCUTANEOUS AT BEDTIME
Refills: 0 | Status: DISCONTINUED | OUTPATIENT
Start: 2019-07-05 | End: 2019-08-19

## 2019-07-05 RX ORDER — DEXTROSE 50 % IN WATER 50 %
25 SYRINGE (ML) INTRAVENOUS ONCE
Refills: 0 | Status: DISCONTINUED | OUTPATIENT
Start: 2019-07-05 | End: 2019-09-05

## 2019-07-05 RX ORDER — ATORVASTATIN CALCIUM 80 MG/1
40 TABLET, FILM COATED ORAL AT BEDTIME
Refills: 0 | Status: DISCONTINUED | OUTPATIENT
Start: 2019-07-05 | End: 2019-09-05

## 2019-07-05 RX ORDER — TICAGRELOR 90 MG/1
90 TABLET ORAL EVERY 12 HOURS
Refills: 0 | Status: DISCONTINUED | OUTPATIENT
Start: 2019-07-05 | End: 2019-07-13

## 2019-07-05 RX ORDER — INSULIN GLARGINE 100 [IU]/ML
25 INJECTION, SOLUTION SUBCUTANEOUS AT BEDTIME
Refills: 0 | Status: DISCONTINUED | OUTPATIENT
Start: 2019-07-05 | End: 2019-07-09

## 2019-07-05 RX ORDER — LIDOCAINE 4 G/100G
1 CREAM TOPICAL DAILY
Refills: 0 | Status: DISCONTINUED | OUTPATIENT
Start: 2019-07-05 | End: 2019-09-05

## 2019-07-05 RX ADMIN — Medication 1: at 23:39

## 2019-07-05 RX ADMIN — HEPARIN SODIUM 5000 UNIT(S): 5000 INJECTION INTRAVENOUS; SUBCUTANEOUS at 21:54

## 2019-07-05 RX ADMIN — Medication 100 MILLIGRAM(S): at 21:55

## 2019-07-05 RX ADMIN — INSULIN GLARGINE 25 UNIT(S): 100 INJECTION, SOLUTION SUBCUTANEOUS at 23:38

## 2019-07-05 RX ADMIN — LIDOCAINE 1 PATCH: 4 CREAM TOPICAL at 23:37

## 2019-07-05 RX ADMIN — Medication 162 MILLIGRAM(S): at 12:35

## 2019-07-05 RX ADMIN — TICAGRELOR 90 MILLIGRAM(S): 90 TABLET ORAL at 21:55

## 2019-07-05 RX ADMIN — ATORVASTATIN CALCIUM 40 MILLIGRAM(S): 80 TABLET, FILM COATED ORAL at 21:55

## 2019-07-05 RX ADMIN — Medication 40 MILLIGRAM(S): at 14:18

## 2019-07-05 RX ADMIN — SENNA PLUS 2 TABLET(S): 8.6 TABLET ORAL at 21:55

## 2019-07-05 NOTE — ED PROVIDER NOTE - EKG ADDITIONAL INFORMATION FREE TEXT
sinus tachycardia w/ new Twi in I and AVL, no ST segment elevations t wave flattening in v5/v6, axis is leftword  EP interpretation- concern for Ischemic changes

## 2019-07-05 NOTE — H&P ADULT - HISTORY OF PRESENT ILLNESS
Pt is a 74 yo woman with PMHx of HTN, T2DM, CAD s/p CABG (LIMA to LAD, SVG to OM, SVG to PDA 2014 at [facility]), non-dilated ICM (EF 20-25%), severe mitral regurgitation s/p mitral clip (6/13/19 Dr. Newman), severe pulm HTN, hypothyroidism, who is presenting [facility] ED [facility]. Also complaining of intermittent chest pain. Of note, pt was recently discharged on 6/19 after having mitral clip procedure completed. Pt says that she has been experiencing SOB for about 1 week. However, she was never noted to be hypoxic in rehab until today. She was not able to provide much hx 2/2 SOB and being on bipap. Daughter at bedside reporting that pt was well immediately after discharge. However, she gradually developed worsening SOB. Better with rest initially. Nothing alleviating SOB now. It is constant throughout the day, made worse with exertion. No fevers, chills, nausea, vomiting, cough, sputum production, chest tightness, pressure, palpitations, LOC, syncope.    In the ED, pt afebrile, , /75, RR 22, O2 100% on 2L NC. She subsequently developed worsened work of breathing and was placed on bipap with improvement. She was given , lasix 40mg IVP x1. CXR significant for pulmonary edema. Anemia to 9.3/28.5. BUN/Cr 42/1.72 (54/2.54 on 6/19). BNP 6626. VBG 7.49/29/61/22. Bedside cardiac US to be obtained by CT surg. Pt is a 72 yo woman with PMHx of HTN, T2DM, CAD s/p CABG (LIMA to LAD, SVG to OM, SVG to PDA 2014 at [facility]), non-dilated ICM (EF 20-25%), severe mitral regurgitation s/p mitral clip (6/13/19 Dr. Newman), severe pulm HTN, CKD, hypothyroidism, who is presenting [facility] ED [facility] Also complaining of intermittent chest pain. Of note, pt was recently discharged on 6/19 after having mitral clip procedure completed. Pt says that she has been experiencing SOB for about 1 week. However, she was never noted to be hypoxic in rehab until today. She was not able to provide much hx 2/2 SOB and being on bipap. Daughter at bedside reporting that pt was well immediately after discharge. However, she gradually developed worsening SOB. Better with rest initially. Nothing alleviating SOB now. It is constant throughout the day, made worse with exertion. No fevers, chills, nausea, vomiting, cough, sputum production, chest tightness, pressure, palpitations, LOC, syncope.    In the ED, pt afebrile, , /75, RR 22, O2 100% on 2L NC. She subsequently developed worsened work of breathing and was placed on bipap with improvement. She was given , lasix 40mg IVP x1. CXR significant for pulmonary edema. Anemia to 9.3/28.5. BUN/Cr 42/1.72 (54/2.54 on 6/19). BNP 6626. VBG 7.49/29/61/22. Bedside cardiac US to be obtained by CT surg. Pt is a 72 yo woman with PMHx of HTN, T2DM, CAD s/p CABG (LIMA to LAD, SVG to OM, SVG to PDA 2014 at [facility]), non-dilated ICM (EF 20-25%), severe mitral regurgitation s/p mitral clip (6/13/19 Dr. Newman), severe pulm HTN, CKD, hypothyroidism, who is presenting [facility] ED [facility] Also complaining of intermittent chest pain. Of note, pt was recently discharged on 6/19 after having mitral clip procedure completed. Pt says that she has been experiencing SOB for about 1 week. However, she was never noted to be hypoxic in rehab until today. She was not able to provide much hx 2/2 SOB and being on bipap. Daughter at bedside reporting that pt was well immediately after discharge. However, she gradually developed worsening SOB. Better with rest initially. Nothing alleviating SOB now. It is constant throughout the day, made worse with exertion. No fevers, chills, nausea, vomiting, cough, sputum production, chest tightness, pressure, palpitations, LOC, syncope.    In the ED, pt afebrile, , /75, RR 22, O2 100% on 2L NC. She subsequently developed worsened work of breathing and was placed on bipap with improvement. She was given , lasix 40mg IVP x1. CXR significant for pulmonary edema. Anemia to 9.3/28.5. BUN/Cr 42/1.72 (54/2.54 on 6/19). BNP 6626. VBG 7.49/29/61/22. TTE pending.

## 2019-07-05 NOTE — ED ADULT NURSE REASSESSMENT NOTE - NS ED NURSE REASSESS COMMENT FT1
Bea reports feeling her breathing is comfortable on 3L NC and no shortness of breath.  No accessory muscle use noted.
Patient repositioned in stretcher and reports feeling tania since medication was given.
Patient turned, cleaned and repositioned in stretcher reports breathing is improved with BiPap.  Bed in lowest position.
Patient updated on plan of care and family at bedside.  Patient denies pain or shortness of breath.  2L NC maintained.
Abdomen distended on exam, admitted awaiting MICU consult. FC inserted aseptically as ordered for urine output monitoring. Afebrile, placed on O2 at 3LNC, tolerated well.

## 2019-07-05 NOTE — PATIENT PROFILE ADULT - NSPROHMDIABETMGMTSTRAT_GEN_A_NUR
exercise/insulin therapy/routine screenings/medication therapy/blood glucose testing/diet modification/weight management

## 2019-07-05 NOTE — ED ADULT NURSE NOTE - OBJECTIVE STATEMENT
73 year old female h/o COPD, DM and CABG one month ago arrived by EMS from rehab for shortness of breath that began during the night.  She denies: chest pain, dizziness, N/V.  L/s clear to auscultation, no lower extremity swelling noted, patient appears dyspneic  on 2L NC.  Bed in lowest position. 73 year old female h/o COPD, DM and CABG one month ago arrived by EMS from rehab for shortness of breath that began during the night.  She denies: chest pain, dizziness, N/V.  L/s clear to auscultation, no lower extremity swelling noted, patient appears dyspneic  on 2L NC, abdomen is distended and stage II on sacrum.  Bed in lowest position.

## 2019-07-05 NOTE — ED PROVIDER NOTE - PROGRESS NOTE DETAILS
pt has trop in the 200s, will c/s cards Arden BORJAS: Discussed with cardiology fellow, admit to CCU under Dr. Gunter.

## 2019-07-05 NOTE — PROGRESS NOTE ADULT - SUBJECTIVE AND OBJECTIVE BOX
====================  CCU MIDNIGHT ROUNDS  ====================    SELVIN CLAIRE  [id number]  Patient is a 73y old  Female who presents with a chief complaint of Hypoxia, SOB (05 Jul 2019 19:02)      ====================  SUMMARY:  ====================      ====================  NEW EVENTS:  ====================    MEDICATIONS  (STANDING):  aspirin enteric coated 81 milliGRAM(s) Oral daily  atorvastatin 40 milliGRAM(s) Oral at bedtime  chlorhexidine 2% Cloths 1 Application(s) Topical daily  dextrose 5%. 1000 milliLiter(s) (50 mL/Hr) IV Continuous <Continuous>  dextrose 50% Injectable 12.5 Gram(s) IV Push once  dextrose 50% Injectable 25 Gram(s) IV Push once  dextrose 50% Injectable 25 Gram(s) IV Push once  docusate sodium 100 milliGRAM(s) Oral two times a day  heparin  Injectable 5000 Unit(s) SubCutaneous every 8 hours  insulin glargine Injectable (LANTUS) 25 Unit(s) SubCutaneous at bedtime  insulin lispro (HumaLOG) corrective regimen sliding scale   SubCutaneous three times a day before meals  insulin lispro (HumaLOG) corrective regimen sliding scale   SubCutaneous at bedtime  insulin lispro Injectable (HumaLOG) 10 Unit(s) SubCutaneous three times a day before meals  levothyroxine 50 MICROGram(s) Oral daily  lidocaine   Patch 1 Patch Transdermal daily  montelukast 10 milliGRAM(s) Oral daily  pantoprazole    Tablet 40 milliGRAM(s) Oral before breakfast  senna 2 Tablet(s) Oral at bedtime  ticagrelor 90 milliGRAM(s) Oral every 12 hours    MEDICATIONS  (PRN):  acetaminophen   Tablet .. 650 milliGRAM(s) Oral every 6 hours PRN Mild Pain (1 - 3), Moderate Pain (4 - 6)  dextrose 40% Gel 15 Gram(s) Oral once PRN Blood Glucose LESS THAN 70 milliGRAM(s)/deciliter  glucagon  Injectable 1 milliGRAM(s) IntraMuscular once PRN Glucose LESS THAN 70 milligrams/deciliter      ====================  VITALS (Last 12 hrs):  ====================    T(C): 36.6 (07-05-19 @ 19:58), Max: 37.6 (07-05-19 @ 14:06)  T(F): 97.9 (07-05-19 @ 19:58), Max: 99.6 (07-05-19 @ 14:06)  HR: 82 (07-05-19 @ 22:00) (82 - 108)  BP: 81/47 (07-05-19 @ 22:00) (80/43 - 121/75)  BP(mean): 59 (07-05-19 @ 22:00) (55 - 83)  RR: 27 (07-05-19 @ 22:00) (20 - 50)  SpO2: 99% (07-05-19 @ 22:00) (96% - 100%)      I&O's Summary    05 Jul 2019 07:01  -  05 Jul 2019 22:23  --------------------------------------------------------  IN: 120 mL / OUT: 355 mL / NET: -235 mL    ====================  NEW LABS:  ====================      07-05    142  |  105  |  41<H>  ----------------------------<  177<H>  4.3   |  23  |  1.89<H>    Ca    9.6      05 Jul 2019 20:42  Phos  3.8     07-05  Mg     2.1     07-05    TPro  7.9  /  Alb  3.8  /  TBili  0.4  /  DBili  x   /  AST  21  /  ALT  30  /  AlkPhos  154<H>  07-05    PT/INR - ( 05 Jul 2019 20:42 )   PT: 12.9 sec;   INR: 1.13 ratio         PTT - ( 05 Jul 2019 20:42 )  PTT:37.1 sec  Creatine Kinase, Serum: 65 U/L (07-05-19 @ 20:42)    CKMB Units: 5.5 ng/mL (07-05 @ 20:42)      ====================  PLAN:  ====================  -       Mecca Prado [facility] NP  Beeper #[id number]  Spectra # [phone] ====================  CCU MIDNIGHT ROUNDS  ====================    SELVIN CLAIRE  [id number]  Patient is a 73y old  Female who presents with a chief complaint of Hypoxia, SOB (05 Jul 2019 19:02)      ====================  SUMMARY:  74 yo woman with PMHx of HTN, T2DM, CAD s/p CABG (LIMA to LAD, SVG to OM, SVG to PDA 2014 at [facility]), non-dilated ICM (EF 20-25%), severe mitral regurgitation s/p mitral clip (6/13/19 Dr. Newman), severe pulm HTN, CKD, hypothyroidism, who is presenting to ED after experiencing worsening SOB and intermittent chest pain at [facility]. In ED, pt was placed on BiPAP and given lasix 40mg IVPx1.  Transferred to CCU for further management  ====================      ====================  NEW EVENTS: Off BiPAP and tolerating 2LNC. UOP 500ml 24hr Net(-)300ml  ====================    MEDICATIONS  (STANDING):  aspirin enteric coated 81 milliGRAM(s) Oral daily  atorvastatin 40 milliGRAM(s) Oral at bedtime  chlorhexidine 2% Cloths 1 Application(s) Topical daily  dextrose 5%. 1000 milliLiter(s) (50 mL/Hr) IV Continuous <Continuous>  dextrose 50% Injectable 12.5 Gram(s) IV Push once  dextrose 50% Injectable 25 Gram(s) IV Push once  dextrose 50% Injectable 25 Gram(s) IV Push once  docusate sodium 100 milliGRAM(s) Oral two times a day  heparin  Injectable 5000 Unit(s) SubCutaneous every 8 hours  insulin glargine Injectable (LANTUS) 25 Unit(s) SubCutaneous at bedtime  insulin lispro (HumaLOG) corrective regimen sliding scale   SubCutaneous three times a day before meals  insulin lispro (HumaLOG) corrective regimen sliding scale   SubCutaneous at bedtime  insulin lispro Injectable (HumaLOG) 10 Unit(s) SubCutaneous three times a day before meals  levothyroxine 50 MICROGram(s) Oral daily  lidocaine   Patch 1 Patch Transdermal daily  montelukast 10 milliGRAM(s) Oral daily  pantoprazole    Tablet 40 milliGRAM(s) Oral before breakfast  senna 2 Tablet(s) Oral at bedtime  ticagrelor 90 milliGRAM(s) Oral every 12 hours    MEDICATIONS  (PRN):  acetaminophen   Tablet .. 650 milliGRAM(s) Oral every 6 hours PRN Mild Pain (1 - 3), Moderate Pain (4 - 6)  dextrose 40% Gel 15 Gram(s) Oral once PRN Blood Glucose LESS THAN 70 milliGRAM(s)/deciliter  glucagon  Injectable 1 milliGRAM(s) IntraMuscular once PRN Glucose LESS THAN 70 milligrams/deciliter      ====================  VITALS (Last 12 hrs):  ====================    T(C): 36.6 (07-05-19 @ 19:58), Max: 37.6 (07-05-19 @ 14:06)  T(F): 97.9 (07-05-19 @ 19:58), Max: 99.6 (07-05-19 @ 14:06)  HR: 82 (07-05-19 @ 22:00) (82 - 108)  BP: 81/47 (07-05-19 @ 22:00) (80/43 - 121/75)  BP(mean): 59 (07-05-19 @ 22:00) (55 - 83)  RR: 27 (07-05-19 @ 22:00) (20 - 50)  SpO2: 99% (07-05-19 @ 22:00) (96% - 100%)      I&O's Summary    05 Jul 2019 07:01  -  05 Jul 2019 22:23  --------------------------------------------------------  IN: 120 mL / OUT: 355 mL / NET: -235 mL    ====================  NEW LABS:  ====================      07-05    142  |  105  |  41<H>  ----------------------------<  177<H>  4.3   |  23  |  1.89<H>    Ca    9.6      05 Jul 2019 20:42  Phos  3.8     07-05  Mg     2.1     07-05    TPro  7.9  /  Alb  3.8  /  TBili  0.4  /  DBili  x   /  AST  21  /  ALT  30  /  AlkPhos  154<H>  07-05    PT/INR - ( 05 Jul 2019 20:42 )   PT: 12.9 sec;   INR: 1.13 ratio         PTT - ( 05 Jul 2019 20:42 )  PTT:37.1 sec  Creatine Kinase, Serum: 65 U/L (07-05-19 @ 20:42)    CKMB Units: 5.5 ng/mL (07-05 @ 20:42)      ====================  PLAN:  ====================  #Cardiac  Acute decompensated heart failure   -TTE 7/5 showed EF 15-20% with global LV systolic dysfunction  -BiPAP as need for SOB  -s/p lasix 40mg IVP x1, PRN as needed   -Strict I+O, O>I, daily weights  -holding home BP meds due to soft BP overnight    #Endo  DM, hyperglycemia  -Hgb A1C 7.4  -started Lantus 25U at bedtime  -started ISS pre meal and at bedtime  -Started lispro 10U pre meal      Mecca Prado [facility] NP  Beeper #[id number]  Spectra # [phone]

## 2019-07-05 NOTE — ED ADULT NURSE NOTE - INTERVENTIONS DEFINITIONS
Stretcher in lowest position, wheels locked, appropriate side rails in place/Provide visual cue, wrist band, yellow gown, etc./Physically safe environment: no spills, clutter or unnecessary equipment

## 2019-07-05 NOTE — CONSULT NOTE ADULT - SUBJECTIVE AND OBJECTIVE BOX
History of Present Illness:  73y Female PMH of CAD s/p CABG 2014, HTN, HLD, DM2, hypothyroidism,  CKD III and recent hospitalization for mitraclip 6/13/19  p/w SOB x 2 days from rehab. Denies chest pain, palpitations and fever. O2 saturation 92-96% room air at present.    Past Medical History  Urinary tract infection: On ceftriaxone  GERD (gastroesophageal reflux disease)  Hypertension  CAD (coronary artery disease): s/p CABG  Hypothyroidism  Hyperlipidemia  Diabetes mellitus, type 2  Diabetes mellitus type 2 in nonobese      Past Surgical History  S/P CABG (coronary artery bypass graft)      MEDICATIONS  (STANDING):    MEDICATIONS  (PRN):    Antiplatelet therapy:                           Last dose/amt:    Allergies: azithromycin (Hives; Pruritus)      SOCIAL HISTORY:  Smoker: [ ] Yes  [x ] No        PACK YEARS:                         WHEN QUIT?  ETOH use: [ ] Yes  [ x] No              FREQUENCY / QUANTITY:  Ilicit Drug use:  [ ] Yes  [x ] No  Occupation:  Live with: son and daughter n law  Assist device use: RW    Relevant Family History  FAMILY HISTORY:  Family history of hypertension (Sibling): sister  Family history of diabetes mellitus (Sibling): brothers/sisters      Review of Systems  GENERAL:  Fevers[] chills[] sweats[] fatigue[] weight loss[] weight gain []                                        NEURO:  parathesias[] seizures []  syncope []  confusion []                                                                                  EYES: glasses[]  blurry vision[]  discharge[] pain[] glaucoma []                                                                            ENMT:  difficulty hearing []  vertigo[]  dysphagia[] epistaxis[] recent dental work []                                      CV:  chest pain[] palpitations[] ZEE [] diaphoresis [] edema[]                                                                                             RESPIRATORY:  wheezing[] SOB[x] cough [] sputum[] hemoptysis[]                                                                    GI:  nausea[]  vomiting []  diarrhea[] constipation [] melena []                                                                        : hematuria[ ]  dysuria[ ] urgency[] incontinence[]                                                                                              MUSKULOSKELETAL:  arthritis[ ]  joint swelling [ ] muscle weakness [ ]                                                                  SKIN/BREAST:  rash[ ] itching [ ]  hair loss[ ] masses[ ]                                                                                                PSYCH:  dementia [ ] depresion [ ] anxiety[ ]                                                                                                                  HEME/LYMPH:  bruises easily[ ] enlarged lymph nodes[ ] tender lymph nodes[ ]                                                 ENDOCRINE:  cold intolerance[ ] heat intolerance[ ] polydipsia[ ]                                                                              PHYSICAL EXAM  Vital Signs Last 24 Hrs  T(C): 37.6 (05 Jul 2019 14:06), Max: 37.6 (05 Jul 2019 14:06)  T(F): 99.6 (05 Jul 2019 14:06), Max: 99.6 (05 Jul 2019 14:06)  HR: 89 (05 Jul 2019 16:27) (89 - 108)  BP: 107/61 (05 Jul 2019 16:27) (93/73 - 121/75)  BP(mean): --  RR: 24 (05 Jul 2019 16:27) (22 - 26)  SpO2: 100% (05 Jul 2019 16:27) (96% - 100%)    General: Well nourished, well developed, no acute distress.                                                         Neuro: Normal exam oriented to person/place & time with no focal motor or sensory  deficits.                    Eyes: Normal exam of conjunctiva & lids, pupils equally reactive.   ENT: Normal exam of nasal/oral mucosa with absence of cyanosis.   Neck: Normal exam of jugular veins, trachea & thyroid.   Chest: Normal lung exam with good air movement absence of wheezes, rales, or rhonchi:                                                                          CV:  Auscultation: normal [x ] S3[ ] S4[ ] Irregular [ ] Rub[ ] Clicks[ ]  Murmurs none:[ ]systolic [ ]  diastolic [ ] holosystolic [ ]  Carotids: No Bruits[ ] Other____________ Abdominal Aorta: normal [ ] nonpalpable[ ]                                                                         GI: Normal exam of abdomen, liver & spleen with no noted masses or tenderness.                                                                                              Extremities: Normal no evidence of cyanosis or deformity Edema: none[x ]trace[ ]1+[ ]2+[ ]3+[ ]4+[ ]  Lower Extremity Pulses: Right[ x] Leftx[ ]Varicosities[ ]  SKIN : Normal exam to inspection & palpation.                                                           LABS:                        9.3    13.5  )-----------( 439      ( 05 Jul 2019 12:43 )             28.5     07-05    139  |  104  |  42<H>  ----------------------------<  129<H>  4.5   |  19<L>  |  1.72<H>    Ca    9.4      05 Jul 2019 12:43    TPro  8.1  /  Alb  3.8  /  TBili  0.4  /  DBili  x   /  AST  26  /  ALT  34  /  AlkPhos  165<H>  07-05    PT/INR - ( 05 Jul 2019 12:43 )   PT: 12.5 sec;   INR: 1.09 ratio         PTT - ( 05 Jul 2019 12:43 )  PTT:35.6 sec      Chest x-ray with pulmonary vascular congestion        Cardiac Cath: < from: Cardiac Cath Lab - Adult (06.13.19 @ 08:47) >  DIAGNOSTIC RECOMMENDATIONS: Successful MitraClip NTR in the setting of  severe functional MR and acutor on chronic decompensated systolic CHF.  Final result is trace MR.    < end of copied text >  < from: Cardiac Cath Lab - Adult (06.13.19 @ 08:47) >  DIAGNOSTIC RECOMMENDATIONS: Successful MitraClip NTR in the setting of  severe functional MR and acutor on chronic decompensated systolic CHF.  Final result is trace MR.    < end of copied text >      TTE / LV: pending

## 2019-07-05 NOTE — H&P ADULT - NSHPLABSRESULTS_GEN_ALL_CORE
LABS:  (07-05 @ 12:43)                        9.3  13.5 )-----------( 439                 28.5    Neutrophils = 12.2 (90.6%)  Lymphocytes = 0.5 (3.9%)  Eosinophils = 0.1 (0.8%)  Basophils = 0.0 (0.0%)  Monocytes = 0.6 (4.7%)  Bands = --%    WBC Trend: 13.5<--  Hb Trend: 9.3<--  Plt Trend: 439<--  07-05    139  |  104  |  42<H>  ----------------------------<  129<H>  4.5   |  19<L>  |  1.72<H>    Ca    9.4      05 Jul 2019 12:43    TPro  8.1  /  Alb  3.8  /  TBili  0.4  /  DBili  x   /  AST  26  /  ALT  34  /  AlkPhos  165<H>  07-05    Creatinine Trend: 1.72<--, 2.54<--, 2.52<--, 2.27<--, 2.03<--, 1.83<--  PT/INR - ( 05 Jul 2019 12:43 )   PT: 12.5 sec;   INR: 1.09 ratio         PTT - ( 05 Jul 2019 12:43 )  PTT:35.6 sec      Venous Blood Gas:  07-05 @ 12:43  7.49/29/61/22/91  VBG Lactate: 2.1      RADIOLOGY:  < from: Xray Chest 1 View- PORTABLE-Urgent (07.05.19 @ 13:22) >      EXAM:  XR CHEST PORTABLE URGENT 1V                            PROCEDURE DATE:  07/05/2019            INTERPRETATION:  A single chest x-ray was obtained on July 5, 2019.    Indication: Chest pain.    Impression:    The heart is enlarged. Mild pulmonary vascular congestion. Status post   sternotomy. No acute consolidation could be identified. No pneumothorax.   No fractures.        < end of copied text >                    ISAIAH FOSTER M.D., ATTENDING RADIOLOGIST  This document has been electronically signed. Jul 5 20191:34PM            EKG: tachycardic , regular rhythm

## 2019-07-05 NOTE — CONSULT NOTE ADULT - ASSESSMENT
73y Female PMH of CAD s/p CABG 2014, HTN, HLD, DM2, hypothyroidism,  CKD III  creat (1.5-1.8 baseline), HFrEF (LVEF 25%) severe MR, severe pulm HTN, s/p  mitraclip 6/13/19  admitted for fluid overload.

## 2019-07-05 NOTE — ED PROVIDER NOTE - ATTENDING CONTRIBUTION TO CARE
HTN, T2DM, CAD s/p CABG 2014 (LIMA to LAD, SVG to OM, SVG to PDA at [facility] 2014),  non-dilated ICM (EF 20-25%, normal), severe MR s/p mitral clip 6/13, hypothyroidism who initially presented from Rehab to [facility] 6/1 w/ acute on chronic systolic heart failure 73 year old F HTN, T2DM, CAD s/p CABG 2014 (LIMA to LAD, SVG to OM, SVG to PDA at [facility] 2014),  non-dilated ICM (EF 20-25%, normal), severe MR s/p mitral clip 6/13, hypothyroidism who initially presented from Rehab to [facility] 6/1 w/ acute on chronic systolic heart failure presents to the ED w/ SOB. Pt upon arrival to the ED is not able to speak much given her difficulty breathing, she denies CP to me, no nausea nor vomiting. Pt is tachy to the 120s and hypoxic to the 80s on RA w/ tachypnea to the mid 30s, pt was placed on oxygen and cardiopulmonary monitor. Pt has a hx of intubation.     On exam, she has crackles bilaterally, and is tachypneic w/ increased WOB. Pt has ekg that revealed new t wave inversions in lead I and AVL, she was given aspirin and started on BIPAP. Pt had improvement in her WOB and tachypnea on bipap Pt was also given lasix as CXR reveals volume overload. Tropin is positive, cardiology consult recommended no catheterization recommended CT surgery eval as pt had recent mitral clip on 6/13 and concern for severe MR as the etiology of acute CHF exacerbation resulting in subsequent troponin leak. Dispo pending CT surgery eval.

## 2019-07-05 NOTE — CONSULT NOTE ADULT - SUBJECTIVE AND OBJECTIVE BOX
Patient seen and evaluated @   Reason for consult:     HPI: 73 year old F pt with PMH of CAD s/p CABG, HTN, HLD, DM2, hypothyroidism, and recent hospitalization for mitraclip p/w SOB x 1 week and found to be hypoxic in rehab. When seen in the ED, pt was on bipap and not able to provide much history. Per daughter, she was doing well following discharge but noted gradual SOB that worsened. Pt denied chest pain and palpitations. ROS otherwise unremarkable.    Primary Service HPI:    PMH:   Urinary tract infection  GERD (gastroesophageal reflux disease)  Hypertension  CAD (coronary artery disease)  Hypothyroidism  Hyperlipidemia  Diabetes mellitus, type 2  Diabetes mellitus type 2 in nonobese    PSH:   S/P CABG (coronary artery bypass graft)    Medications:   aspirin  chewable 162 milliGRAM(s) Chew once    Allergies:  azithromycin (Hives; Pruritus)    FAMILY HISTORY:  Family history of hypertension (Sibling): sister  Family history of diabetes mellitus (Sibling): brothers/sisters    Social History:  Smoking:  Alcohol:  Drugs:    Review of Systems:  Constitutional: [ ] Fever [ ] Chills [x] Fatigue [ ] Weight change   HEENT: [ ] Blurred vision [ ] Eye Pain [ ] Headache [ ] Runny nose [ ] Sore Throat   Respiratory: [ ] Cough [ ] Wheezing [\x ] Shortness of breath  Cardiovascular: [ ] Chest Pain [ ] Palpitations [ ] ZEE [ ] PND [ ] Orthopnea  Gastrointestinal: [ ] Abdominal Pain [ ] Diarrhea [ ] Constipation [ ] Hemorrhoids [ ] Nausea [ ] Vomiting  Genitourinary: [ ] Nocturia [ ] Dysuria [ ] Incontinence  Extremities: [ ] Swelling [ ] Joint Pain  Neurologic: [ ] Focal deficit [ ] Paresthesias [ ] Syncope  Lymphatic: [ ] Swelling [ ] Lymphadenopathy   Skin: [ ] Rash [ ] Ecchymoses [ ] Wounds [ ] Lesions  Psychiatry: [ ] Depression [ ] Suicidal/Homicidal Ideation [ ] Anxiety [ ] Sleep Disturbances  [ ] 10 point review of systems is otherwise negative except as mentioned above            [ ]Unable to obtain    Physical Exam:  T(C): 37.6 (07-05-19 @ 14:06), Max: 37.6 (07-05-19 @ 14:06)  HR: 89 (07-05-19 @ 14:19) (89 - 108)  BP: 100/63 (07-05-19 @ 14:19) (93/73 - 121/75)  RR: 24 (07-05-19 @ 14:19) (22 - 26)  SpO2: 99% (07-05-19 @ 14:19) (96% - 100%)  Wt(kg): --    Daily Height in cm: 157.48 (05 Jul 2019 11:35)    Daily     Appearance: mild respiratory distress  Eyes: PERRL, EOMI  HENT: Normal oral mucosa, NC/AT  Cardiovascular: normal S1 and S2, RRR, 2/6 systolic murmur, 1+ edema, normal JVP  Procedural Access Site:  No hematoma, non-tender to palpation, 2+ pulses distally, no bruit, no ecchymosis  Respiratory: On BiPAP, coarse breath sounds  Gastrointestinal: Soft, non-tender, non-distended, BS+  Musculoskeletal: No clubbing, no joint deformity   Neurologic: Non-focal  Lymphatic: No lymphadenopathy  Psychiatry: AAOx3, mood & affect appropriate  Skin: No rashes, no ecchymoses, no cyanosis    Cardiovascular Diagnostic Testing:  ECG: Sinus rhythm with no ST deviations from recent hospitalization    Echo:    Stress Testing:    Cath:    Interpretation of Telemetry:    Imaging:    Labs:                        9.3    13.5  )-----------( 439      ( 05 Jul 2019 12:43 )             28.5     07-05    139  |  104  |  42<H>  ----------------------------<  129<H>  4.5   |  19<L>  |  1.72<H>    Ca    9.4      05 Jul 2019 12:43    TPro  8.1  /  Alb  3.8  /  TBili  0.4  /  DBili  x   /  AST  26  /  ALT  34  /  AlkPhos  165<H>  07-05    PT/INR - ( 05 Jul 2019 12:43 )   PT: 12.5 sec;   INR: 1.09 ratio         PTT - ( 05 Jul 2019 12:43 )  PTT:35.6 sec      Serum Pro-Brain Natriuretic Peptide: 6626 pg/mL (07-05 @ 12:43)

## 2019-07-05 NOTE — CONSULT NOTE ADULT - ASSESSMENT
A/P: 73 year old F pt with PMH of CAD s/p CABG, HTN, HLD, DM2, hypothyroidism, and recent hospitalization for mitraclip p/w SOB x 1 week and found to be hypoxic in rehab.    - on bipap and persistently sob  - ct surgery called for recent mitraclip  - c/w diuresis  - will continue to follow, please call with further questions    Jose Rodriguez, #[phone]  Cardiology Fellow

## 2019-07-05 NOTE — CONSULT NOTE ADULT - PROBLEM SELECTOR RECOMMENDATION 9
continue with diuresis consider Bumex 2mg BID  Heart Failure Team consult   TTE   No indication for CT surgical admit. Medical management at this time   Discussed with Dr Newman

## 2019-07-05 NOTE — CONSULT NOTE ADULT - ATTENDING COMMENTS
73 year old woman with history of remote CABG, MitraClip 3 weeks ago sent to ER from Rehab complaining of shortness of breath and lower extremity edema and has hypoxemia. On exam mild volume overload. Chest x-ray with pulmonary vascular congestion. Plan admit and diurese, received 40 mg IV furosemide in ER.

## 2019-07-05 NOTE — CONSULT NOTE ADULT - SUBJECTIVE AND OBJECTIVE BOX
Dr. Muhammad  Office [phone]  Cell [phone]  [facility]  Cell [phone]    HPI:  Pt is a 74 yo woman with PMHx of HTN, T2DM, CAD s/p CABG (LIMA to LAD, SVG to OM, SVG to PDA 2014 at [facility]), non-dilated ICM (EF 20-25%), severe mitral regurgitation s/p mitral clip (6/13/19 Dr. Newman), severe pulm HTN, CKD, hypothyroidism, who is presenting [facility] ED [facility] Also complaining of intermittent chest pain. Of note, pt was recently discharged on 6/19 after having mitral clip procedure completed. Pt says that she has been experiencing SOB for about 1 week. However, she was never noted to be hypoxic in rehab until today. Getting evaluated by cardiology and CTS.        Allergies:  azithromycin (Hives; Pruritus)      PAST MEDICAL & SURGICAL HISTORY:  GERD (gastroesophageal reflux disease)  CAD (coronary artery disease): s/p CABG  Hypothyroidism  Hyperlipidemia  Diabetes mellitus, type 2  S/P CABG (coronary artery bypass graft)      Home Medications Reviewed    Hospital Medications:   MEDICATIONS  (STANDING):  aspirin enteric coated 81 milliGRAM(s) Oral daily  atorvastatin 40 milliGRAM(s) Oral at bedtime  chlorhexidine 2% Cloths 1 Application(s) Topical daily  dextrose 5%. 1000 milliLiter(s) (50 mL/Hr) IV Continuous <Continuous>  dextrose 50% Injectable 12.5 Gram(s) IV Push once  dextrose 50% Injectable 25 Gram(s) IV Push once  dextrose 50% Injectable 25 Gram(s) IV Push once  docusate sodium 100 milliGRAM(s) Oral two times a day  heparin  Injectable 5000 Unit(s) SubCutaneous every 8 hours  insulin glargine Injectable (LANTUS) 25 Unit(s) SubCutaneous at bedtime  insulin lispro (HumaLOG) corrective regimen sliding scale   SubCutaneous three times a day before meals  insulin lispro (HumaLOG) corrective regimen sliding scale   SubCutaneous at bedtime  insulin lispro Injectable (HumaLOG) 10 Unit(s) SubCutaneous three times a day before meals  levothyroxine 50 MICROGram(s) Oral daily  lidocaine   Patch 1 Patch Transdermal daily  montelukast 10 milliGRAM(s) Oral daily  pantoprazole    Tablet 40 milliGRAM(s) Oral before breakfast  senna 2 Tablet(s) Oral at bedtime  ticagrelor 90 milliGRAM(s) Oral every 12 hours      SOCIAL HISTORY:  Denies ETOh, Smoking,     FAMILY HISTORY:  Family history of hypertension (Sibling): sister  Family history of diabetes mellitus (Sibling): brothers/sisters      REVIEW OF SYSTEMS:  CONSTITUTIONAL: No weakness, fevers or chills  EYES/ENT: No visual changes;  No vertigo or throat pain   NECK: No pain or stiffness  RESPIRATORY: as per HPI  CARDIOVASCULAR: as per HPI  GASTROINTESTINAL: No abdominal or epigastric pain. No nausea, vomiting, or hematemesis; No diarrhea or constipation. No melena or hematochezia.  GENITOURINARY: No dysuria, frequency, foamy urine, urinary urgency, incontinence or hematuria  NEUROLOGICAL: No numbness or weakness  SKIN: No itching, burning, rashes, or lesions   VASCULAR: No bilateral lower extremity edema.   All other review of systems is negative unless indicated above.    VITALS:  T(F): 97.9 (07-05-19 @ 19:58), Max: 99.6 (07-05-19 @ 14:06)  HR: 76 (07-05-19 @ 22:36)  BP: 100/53 (07-05-19 @ 22:36)  RR: 24 (07-05-19 @ 22:36)  SpO2: 100% (07-05-19 @ 22:36)  Wt(kg): --    07-05 @ 07:01  -  07-05 @ 22:50  --------------------------------------------------------  IN: 120 mL / OUT: 355 mL / NET: -235 mL      Height (cm): 129.54 (07-05 @ 19:58)  Weight (kg): 55.4 (07-05 @ 19:58)  BMI (kg/m2): 33 (07-05 @ 19:58)  BSA (m2): 1.35 (07-05 @ 19:58)    PHYSICAL EXAM:  Constitutional: NAD  HEENT: anicteric sclera, oropharynx clear, MMM  Neck: No JVD  Respiratory: Bilateral decreased air entry  Cardiovascular: S1, S2, RRR  Gastrointestinal: BS+, soft, NT/ND  Extremities: No cyanosis or clubbing. No peripheral edema  Neurological: A/O x 3, no focal deficits  Psychiatric: Normal mood, normal affect  : No CVA tenderness. No cuellar.   Skin: No rashes       LABS:  07-05    142  |  105  |  41<H>  ----------------------------<  177<H>  4.3   |  23  |  1.89<H>    Ca    9.6      05 Jul 2019 20:42  Phos  3.8     07-05  Mg     2.1     07-05    TPro  7.9  /  Alb  3.8  /  TBili  0.4  /  DBili      /  AST  21  /  ALT  30  /  AlkPhos  154<H>  07-05    Creatinine Trend: 1.89 <--, 1.72 <--                        8.7    9.1   )-----------( 388      ( 05 Jul 2019 20:42 )             27.0     Urine Studies:        RADIOLOGY & ADDITIONAL STUDIES:

## 2019-07-05 NOTE — ED PROVIDER NOTE - OBJECTIVE STATEMENT
73 F PMH HTN, T2DM, CAD s/p CABG, HFrEF (LVEF 25%) severe MR, severe pulm HTN, hypothyroidism, [facility]. Complaining of CP.

## 2019-07-05 NOTE — CHART NOTE - NSCHARTNOTEFT_GEN_A_CORE
CHIEF COMPLAINT:  Pt seen in ER hypoxic om 100% NRM  SUBJECTIVE:     REVIEW OF SYSTEMS:    CONSTITUTIONAL: (  )  weakness,  (  ) fevers or chills  EYES/ENT: (  )visual changes;     NECK: (  ) pain or stiffness  RESPIRATORY:   (  )cough, wheezing, hemoptysis;  (  ) shortness of breath  CARDIOVASCULAR:  (  )chest pain or palpitations  GASTROINTESTINAL:   (  )abdominal or epigastric pain.  (  ) nausea, vomiting, or hematemesis;   (   ) diarrhea or constipation.   GENITOURINARY:   (    ) dysuria, frequency or hematuria  NEUROLOGICAL:  (   ) numbness or weakness   All other review of systems is negative unless indicated above    Vital Signs Last 24 Hrs  T(C): 36.8 (05 Jul 2019 11:35), Max: 36.8 (05 Jul 2019 11:35)  T(F): 98.2 (05 Jul 2019 11:35), Max: 98.2 (05 Jul 2019 11:35)  HR: 102 (05 Jul 2019 12:37) (102 - 108)  BP: 116/64 (05 Jul 2019 12:37) (93/73 - 121/75)  BP(mean): --  RR: 22 (05 Jul 2019 12:37) (22 - 22)  SpO2: 96% (05 Jul 2019 12:37) (96% - 100%)    I&O's Summary      CAPILLARY BLOOD GLUCOSE          PHYSICAL EXAM:    Constitutional:  (   ) NAD,   (   )awake and alert  HEENT: PERR, EOMI,    Neck: Soft and supple, No LAD, No JVD  Respiratory:   ,    (  xx )  , rales   Cardiovascular:     (   )S1 and S2, regular rate and rhythm, no Murmurs, gallops or rubs  Gastrointestinal:  (   )Bowel Sounds present, soft,   (  )nontender, nondistended,    Extremities:    (  ) peripheral edema  Vascular: 2+ peripheral pulses  Neurological:    (    )A/O x 3,   (  ) focal deficits  Musculoskeletal:    (   )  normal strength b/l upper  (     ) normal  lower extremities  Skin: No rashes    MEDICATIONS:  MEDICATIONS  (STANDING):  aspirin  chewable 162 milliGRAM(s) Oral daily      LABS: All Labs Reviewed:                Blood Culture:   Urine Culture      RADIOLOGY/EKG:    ASSESSMENT AND PLAN:    DVT PPX:    ADVANCED DIRECTIVE:    DISPOSITION: CHIEF COMPLAINT:Pt seen in ER hypoxic on 100% NRM and complaining of chest pain.  · patient is under my care at their rehabilitation center for the past 2 weeks she is 73 F PMH HTN, T2DM, CAD s/p CABG, HFrEF (LVEF 25%) severe MR, severe pulm HTN, hypothyroidism, [facility]. Complaining of CP.   she was seen in the facility yesterday secondary to have weakness she was found to have worsening of her respiratory pattern her Bumex was increased but this morning he called me that she is having respiratory distress and advised patient to be sent to the ER she was seen in the ER are a apprehensive tachycardic and her physical exam increasing rales in both lung fields    SUBJECTIVE:     REVIEW OF SYSTEMS:    CONSTITUTIONAL: ( x )  weakness,  (  ) fevers or chills  EYES/ENT: (  )visual changes;     NECK: (  ) pain or stiffness  RESPIRATORY:   (  )cough, wheezing, hemoptysis;  (  ) shortness of breath  CARDIOVASCULAR:  (  x)chest pain or palpitations  GASTROINTESTINAL:   (  )abdominal or epigastric pain.  (  ) nausea, vomiting, or hematemesis;   (   ) diarrhea or constipation.   GENITOURINARY:   (    ) dysuria, frequency or hematuria  NEUROLOGICAL:  (   ) numbness or weakness   All other review of systems is negative unless indicated above    Vital Signs Last 24 Hrs  T(C): 36.8 (05 Jul 2019 11:35), Max: 36.8 (05 Jul 2019 11:35)  T(F): 98.2 (05 Jul 2019 11:35), Max: 98.2 (05 Jul 2019 11:35)  HR: 102 (05 Jul 2019 12:37) (102 - 108)  BP: 116/64 (05 Jul 2019 12:37) (93/73 - 121/75)  BP(mean): --  RR: 22 (05 Jul 2019 12:37) (22 - 22)  SpO2: 96% (05 Jul 2019 12:37) (96% - 100%)    I&O's Summary      CAPILLARY BLOOD GLUCOSE          PHYSICAL EXAM:    Constitutional:   in mild distress and tachycardic,   (  x )awake and alert  HEENT: PERR, EOMI,    Neck: Soft and supple, No LAD, No JVD  Respiratory:   ,    (  xx )  , rales   Cardiovascular:      tachycardic  Gastrointestinal:  (   )Bowel Sounds present, soft,   (  )nontender, nondistended,    Extremities:    (  x) peripheral edema  Vascular: 2+ peripheral pulses  Neurological:    (    )A/O x 3,   (  ) focal deficits  Musculoskeletal:    ( x  )  normal strength b/l upper  (     ) normal  lower extremities  Skin: No rashes    MEDICATIONS:  MEDICATIONS  (STANDING):  aspirin  chewable 162 milliGRAM(s) Oral daily      LABS: PENDING                Blood Culture:   Urine Culture      RADIOLOGY/EKG:    ASSESSMENT AND PLAN:   pt with PMH  HTN, T2DM, CAD s/p CABG 2014 (LIMA to LAD, SVG to OM, SVG to PDA at [facility] 2014),  non-dilated ICM (EF 20-25%, normal), severe MR s/p mitral clip 6/13, hypothyroidism who initially presented from Rehab to [facility] 6/1 w/ acute on chronic systolic heart failure.  patient will be admitted to the cardiology team any questions please call me at [phone] she had a normal chest x-ray 2 days ago in the facility  DVT PPX:    ADVANCED DIRECTIVE:    DISPOSITION:

## 2019-07-05 NOTE — CONSULT NOTE ADULT - ASSESSMENT
Pt is a 72 yo woman with PMHx of HTN, T2DM, CAD s/p CABG (LIMA to LAD, SVG to OM, SVG to PDA 2014 at [facility]), non-dilated ICM (EF 20-25%), severe mitral regurgitation s/p mitral clip (6/13/19 Dr. Newman), severe pulm HTN, CKD, hypothyroidism, who is [facility] ED [facility]    A/P:  CKD stage 4:  Her new baseline Scr 1.8-2.0  Renal function fluctuates sec to CHF  Monitor renal function at present    SOB:  Getting cardiac evaluation  Diuresis per cardiology

## 2019-07-05 NOTE — ED PROVIDER NOTE - PHYSICAL EXAMINATION
I have reviewed the triage vital signs. pt afebrile rectally    Const: Well-nourished, Elderly female  Eyes:  no conjunctival injection w/ no scleral icterus  ENMT: Atraumatic external nose and ears, dry mucus membranes  CVS: +S1/S2,. Peripheral pulses 2+ and equal in all extremities [2].  RESP: labored respiratory effort bilateral basilar crackles .  GI: Nontender/Nondistended  MSK: Normocephalic/Atraumatic , Extremities w/o deformity or ttp   Skin: Warm, Dry No rashes or lesions  Neuro:  Sensation grossly intactly; moving all extremities,   Psych: Awake, Appropriate mood and affect

## 2019-07-05 NOTE — ED PROVIDER NOTE - UNABLE TO OBTAIN
pt is in respiratory distress requiring BIPAP (tachypneic and hypoxic) Severe Illness/Injury tachypneic on BIpap in respiratory distress

## 2019-07-05 NOTE — H&P ADULT - ASSESSMENT
Pt is a 74 yo woman with PMHx of HTN, T2DM, CAD s/p CABG (LIMA to LAD, SVG to OM, SVG to PDA 2014 at [facility]), non-dilated ICM (EF 20-25%), severe mitral regurgitation s/p mitral clip (6/13/19 Dr. Newman), severe pulm HTN, hypothyroidism, who [facility]ens[facility], with need to evaluate recent mitral clip.    #Neuro  -No acute issues  -A&Ox3 at baseline    #CV- CAD s/p CABG (LIMA to LAD, SVG to OM, SVG to PDA 2014 at [facility]), non-dilated ICM (EF 20-25%), severe mitral regurgitation s/p mitral clip (6/13/19 Dr. Newman), severe pulm HTN.  -Pt with SOB, LE edema b/l, pulmonary vascular congestion. Concerning for acute decompensated heart failure.  -Continue with diuresis to titrate for I>O, ideally >-1L  -Continue oxygen supplementation for O2 >92%  -F/u TTE   -CT surg to evaluate mitral clip  -Continue brilinta, lipitor 40, ASA  -Continue hydralazine and imdur PRN, currently with soft BPs    #Pulm  -Pt with SOB and pulmonary edema  -Continue with diuresis as above  -Maintain O2 >92%, currently saturating well on NC    #GI  -Protonix qd    #Renal  -Pt likely with CKD  -Will continue to monitor UOP  -Continue to monitor SCr  -Renally dose medications and avoid nephrotoxins    #Electrolytes  -No acute issues  -K>4, Mg>2    #ID  -Pt afebrile, with leukocytosis to 13.5  -Continue to monitor off abx  -If febrile, would start empiric coverage    #Heme  -Anemia to 9.3/8.4, likely AoCD with iron deficiency  -Will send iron studies    #Endocrine  -Pt with T2DM, insulin dependent  -A1c 7.8% on 6/1  -On home lantus 50U qhs and humalog 24U TID  -Will start with half home dose for now and f/u glucoses    #PPx  -Pt fully anticoagulated on brilinta Pt is a 72 yo woman with PMHx of HTN, T2DM, CAD s/p CABG (LIMA to LAD, SVG to OM, SVG to PDA 2014 at [facility]), non-dilated ICM (EF 20-25%), severe mitral regurgitation s/p mitral clip (6/13/19 Dr. Newman), severe pulm HTN, hypothyroidism, who [facility], with need to evaluate recent mitral clip.    #Neuro  -No acute issues  -A&Ox3 at baseline    #CV- CAD s/p CABG (LIMA to LAD, SVG to OM, SVG to PDA 2014 at [facility]), non-dilated ICM (EF 20-25%), severe mitral regurgitation s/p mitral clip (6/13/19 Dr. Newman), severe pulm HTN.  -Pt with SOB, LE edema b/l, pulmonary vascular congestion. Concerning for acute decompensated heart failure.  -Continue with diuresis to titrate for I>O, ideally >-1L  -Continue oxygen supplementation for O2 >92%  -F/u TTE   -CT surg to evaluate mitral clip  -Continue brilinta, lipitor 40, ASA  -Continue hydralazine and imdur PRN, currently with soft BPs    #Pulm  -Pt with SOB and pulmonary edema  -Continue with diuresis as above  -Maintain O2 >92%, currently saturating well on NC    #GI  -Protonix qd    #Renal  -Pt likely with CKD  -Will continue to monitor UOP  -Continue to monitor SCr  -Renally dose medications and avoid nephrotoxins    #Electrolytes  -No acute issues  -K>4, Mg>2    #ID  -Pt afebrile, with leukocytosis to 13.5  -Continue to monitor off abx  -If febrile, would start empiric coverage    #Heme  -Anemia to 9.3/8.4, likely AoCD with iron deficiency  -Will send iron studies    #Endocrine  -Pt with T2DM, insulin dependent  -A1c 7.8% on 6/1  -On home lantus 50U qhs and humalog 24U TID  -Will start with half home dose for now and f/u glucoses    #PPx  -DVT SQH Pt is a 72 yo woman with PMHx of HTN, T2DM, CAD s/p CABG (LIMA to LAD, SVG to OM, SVG to PDA 2014 at [facility]), non-dilated ICM (EF 20-25%), severe mitral regurgitation s/p mitral clip (6/13/19 Dr. Nemwan), severe pulm HTN, hypothyroidism, who is presenting [facility] ED [facility], with need to evaluate recent mitral clip.    #Neuro  -No acute issues  -A&Ox3 at baseline    #CV- CAD s/p CABG (LIMA to LAD, SVG to OM, SVG to PDA 2014 at [facility]), non-dilated ICM (EF 20-25%), severe mitral regurgitation s/p mitral clip (6/13/19 Dr. Newman), severe pulm HTN.  -Pt with SOB, LE edema b/l, pulmonary vascular congestion. Concerning for acute decompensated heart failure.  -Continue with diuresis to titrate for I>O, ideally >-1L  -Continue oxygen supplementation for O2 >92%  -F/u TTE   -CT surg to evaluate mitral clip  -Continue brilinta, lipitor 40, ASA  -Continue hydralazine and imdur PRN, currently with soft BPs    #Pulm  -Pt with SOB and pulmonary edema  -Continue with diuresis as above  -Maintain O2 >92%, currently saturating well on NC    #GI  -Protonix qd    #Renal  -Pt with hx of CKD (last known baseline ~2)  -Will continue to monitor UOP. For now O>I given hypervolemia  -Continue to monitor SCr  -Renally dose medications and avoid nephrotoxins    #Electrolytes  -No acute issues  -K>4, Mg>2    #ID  -Pt afebrile, with leukocytosis to 13.5  -Continue to monitor off abx  -If febrile, would start empiric coverage    #Heme  -Anemia to 9.3/8.4, likely AoCD with iron deficiency  -Will send iron studies    #Endocrine  -Pt with T2DM, insulin dependent  -A1c 7.8% on 6/1  -On home lantus 50U qhs and humalog 24U TID  -Will start with half home dose for now and f/u glucoses    #PPx  -DVT SQH Pt is a 74 yo woman with PMHx of HTN, T2DM, CAD s/p CABG (LIMA to LAD, SVG to OM, SVG to PDA 2014 at [facility]), non-dilated ICM (EF 20-25%), severe mitral regurgitation s/p mitral clip (6/13/19 Dr. Newman), severe pulm HTN, hypothyroidism, who is [facility], with need to evaluate recent mitral clip.    #Neuro  -No acute issues  -A&Ox3 at baseline    #CV- CAD s/p CABG (LIMA to LAD, SVG to OM, SVG to PDA 2014 at [facility]), non-dilated ICM (EF 20-25%), severe mitral regurgitation s/p mitral clip (6/13/19 Dr. Newman), severe pulm HTN.  -Pt with SOB, LE edema b/l, pulmonary vascular congestion. Concerning for acute decompensated heart failure.  -Continue with diuresis to titrate for I<O. Will continue to monitor UOP for now.  -Continue oxygen supplementation for O2 >92%  -F/u TTE   -CT surg to evaluate mitral clip  -Continue brilinta, lipitor 40, ASA  -Continue hydralazine and imdur PRN, currently with soft BPs    #Pulm  -Pt with SOB and pulmonary edema  -Continue with diuresis as above  -Maintain O2 >92%, currently saturating well on NC    #GI  -Protonix qd    #Renal  -Pt with hx of CKD (last known baseline ~2)  -Will continue to monitor UOP. For now O>I given hypervolemia  -Continue to monitor SCr  -Renally dose medications and avoid nephrotoxins    #Electrolytes  -No acute issues  -K>4, Mg>2    #ID  -Pt afebrile, with leukocytosis to 13.5  -Continue to monitor off abx  -If febrile, would start empiric coverage    #Heme  -Anemia to 9.3/8.4, likely AoCD with iron deficiency  -Will send iron studies    #Endocrine  -Pt with T2DM, insulin dependent  -A1c 7.8% on 6/1  -On home lantus 50U qhs and humalog 24U TID  -Will start with half home dose for now and f/u glucoses    #PPx  -DVT SQH Pt is a 74 yo woman with PMHx of HTN, T2DM, CAD s/p CABG (LIMA to LAD, SVG to OM, SVG to PDA 2014 at [facility]), non-dilated ICM (EF 20-25%), severe mitral regurgitation s/p mitral clip (6/13/19 Dr. Newman), severe pulm HTN, hypothyroidism, who [facility], with need to evaluate recent mitral clip.    #Neuro  -No acute issues  -A&Ox3 at baseline    #CV- CAD s/p CABG (LIMA to LAD, SVG to OM, SVG to PDA 2014 at [facility]), non-dilated ICM (EF 20-25%), severe mitral regurgitation s/p mitral clip (6/13/19 Dr. Newman), severe pulm HTN.  -Pt with SOB, LE edema b/l, pulmonary vascular congestion. Concerning for acute decompensated heart failure.  -Continue with diuresis to titrate for I<O. Will continue to monitor UOP for now.  -Continue oxygen supplementation for O2 >92%  -F/u TTE   -CT surg to evaluate mitral clip  -Continue brilinta, lipitor 40, ASA  -Continue hydralazine and imdur PRN, currently with soft BPs    #Pulm  -Pt with SOB and pulmonary edema  -Continue with diuresis as above  -Maintain O2 >92%, currently saturating well on NC    #GI  -Protonix qd    #Renal  -Pt with hx of CKD (last known baseline ~2)  -Will continue to monitor UOP. For now O>I given hypervolemia  -Continue to monitor SCr  -Renally dose medications and avoid nephrotoxins    #Electrolytes  -No acute issues  -K>4, Mg>2    #ID  -Pt afebrile, with leukocytosis to 13.5  -Continue to monitor off abx  -If febrile, would start empiric coverage    #Heme  -Anemia to 9.3/28.5, likely AoCD with iron deficiency, CKD  -Will send iron studies    #Endocrine  -Pt with T2DM, insulin dependent  -A1c 7.8% on 6/1  -On home lantus 50U qhs and humalog 24U TID  -Will start with half home dose for now and f/u glucoses    #PPx  -DVT SQH

## 2019-07-05 NOTE — ED PROVIDER NOTE - CLINICAL SUMMARY MEDICAL DECISION MAKING FREE TEXT BOX
HTN, T2DM, CAD s/p CABG 2014 (LIMA to LAD, SVG to OM, SVG to PDA at [facility] 2014),  non-dilated ICM (EF 20-25%, normal), severe MR s/p mitral clip 6/13, hypothyroidism w/ respiratory distress w/ new oxygen requirement, has pulmonary edema, concern for decompensated CHF,   PE less likely as CXR shows obvious volume overload, and physical exam, pt has bilateral crackles  Pt had improvement in dyspnea w/ BIPAP and lasix  Unlikely pneumonia, though in the differential, pt has mildly elevated white count w/ left shift, however, pt reports no hx of cough, nor fevers at the rehab facility, additionally, pt is afebrile rectally here.   Pt will be watched in the hospital and if she develops a worsening white count a fever, productive cough or hypoxia not in the setting of volume overload would cover w/ antibiotics and pursue dx of pe as contaminant diagnoses.

## 2019-07-06 LAB
ALBUMIN SERPL ELPH-MCNC: 3.5 G/DL — SIGNIFICANT CHANGE UP (ref 3.3–5)
ALBUMIN SERPL ELPH-MCNC: 3.6 G/DL — SIGNIFICANT CHANGE UP (ref 3.3–5)
ALP SERPL-CCNC: 137 U/L — HIGH (ref 40–120)
ALP SERPL-CCNC: 160 U/L — HIGH (ref 40–120)
ALT FLD-CCNC: 28 U/L — SIGNIFICANT CHANGE UP (ref 10–45)
ALT FLD-CCNC: 30 U/L — SIGNIFICANT CHANGE UP (ref 10–45)
ANION GAP SERPL CALC-SCNC: 16 MMOL/L — SIGNIFICANT CHANGE UP (ref 5–17)
ANION GAP SERPL CALC-SCNC: 18 MMOL/L — HIGH (ref 5–17)
APTT BLD: 35.2 SEC — SIGNIFICANT CHANGE UP (ref 27.5–36.3)
AST SERPL-CCNC: 19 U/L — SIGNIFICANT CHANGE UP (ref 10–40)
AST SERPL-CCNC: 24 U/L — SIGNIFICANT CHANGE UP (ref 10–40)
BASOPHILS # BLD AUTO: 0 K/UL — SIGNIFICANT CHANGE UP (ref 0–0.2)
BASOPHILS NFR BLD AUTO: 0.3 % — SIGNIFICANT CHANGE UP (ref 0–2)
BILIRUB SERPL-MCNC: 0.3 MG/DL — SIGNIFICANT CHANGE UP (ref 0.2–1.2)
BILIRUB SERPL-MCNC: 0.3 MG/DL — SIGNIFICANT CHANGE UP (ref 0.2–1.2)
BUN SERPL-MCNC: 46 MG/DL — HIGH (ref 7–23)
BUN SERPL-MCNC: 50 MG/DL — HIGH (ref 7–23)
CALCIUM SERPL-MCNC: 8.8 MG/DL — SIGNIFICANT CHANGE UP (ref 8.4–10.5)
CALCIUM SERPL-MCNC: 9.2 MG/DL — SIGNIFICANT CHANGE UP (ref 8.4–10.5)
CHLORIDE SERPL-SCNC: 102 MMOL/L — SIGNIFICANT CHANGE UP (ref 96–108)
CHLORIDE SERPL-SCNC: 103 MMOL/L — SIGNIFICANT CHANGE UP (ref 96–108)
CK MB CFR SERPL CALC: 2.8 NG/ML — SIGNIFICANT CHANGE UP (ref 0–3.8)
CK SERPL-CCNC: 46 U/L — SIGNIFICANT CHANGE UP (ref 25–170)
CO2 SERPL-SCNC: 21 MMOL/L — LOW (ref 22–31)
CO2 SERPL-SCNC: 23 MMOL/L — SIGNIFICANT CHANGE UP (ref 22–31)
CREAT SERPL-MCNC: 1.93 MG/DL — HIGH (ref 0.5–1.3)
CREAT SERPL-MCNC: 2.13 MG/DL — HIGH (ref 0.5–1.3)
EOSINOPHIL # BLD AUTO: 0.3 K/UL — SIGNIFICANT CHANGE UP (ref 0–0.5)
EOSINOPHIL NFR BLD AUTO: 3.6 % — SIGNIFICANT CHANGE UP (ref 0–6)
GLUCOSE BLDC GLUCOMTR-MCNC: 199 MG/DL — HIGH (ref 70–99)
GLUCOSE BLDC GLUCOMTR-MCNC: 218 MG/DL — HIGH (ref 70–99)
GLUCOSE BLDC GLUCOMTR-MCNC: 223 MG/DL — HIGH (ref 70–99)
GLUCOSE BLDC GLUCOMTR-MCNC: 303 MG/DL — HIGH (ref 70–99)
GLUCOSE SERPL-MCNC: 150 MG/DL — HIGH (ref 70–99)
GLUCOSE SERPL-MCNC: 195 MG/DL — HIGH (ref 70–99)
HCT VFR BLD CALC: 25.2 % — LOW (ref 34.5–45)
HGB BLD-MCNC: 8.4 G/DL — LOW (ref 11.5–15.5)
INR BLD: 1.12 RATIO — SIGNIFICANT CHANGE UP (ref 0.88–1.16)
LACTATE SERPL-SCNC: 1.4 MMOL/L — SIGNIFICANT CHANGE UP (ref 0.7–2)
LYMPHOCYTES # BLD AUTO: 1 K/UL — SIGNIFICANT CHANGE UP (ref 1–3.3)
LYMPHOCYTES # BLD AUTO: 12.8 % — LOW (ref 13–44)
MAGNESIUM SERPL-MCNC: 2.2 MG/DL — SIGNIFICANT CHANGE UP (ref 1.6–2.6)
MAGNESIUM SERPL-MCNC: 2.2 MG/DL — SIGNIFICANT CHANGE UP (ref 1.6–2.6)
MCHC RBC-ENTMCNC: 29.6 PG — SIGNIFICANT CHANGE UP (ref 27–34)
MCHC RBC-ENTMCNC: 33.2 GM/DL — SIGNIFICANT CHANGE UP (ref 32–36)
MCV RBC AUTO: 89.2 FL — SIGNIFICANT CHANGE UP (ref 80–100)
MONOCYTES # BLD AUTO: 0.6 K/UL — SIGNIFICANT CHANGE UP (ref 0–0.9)
MONOCYTES NFR BLD AUTO: 7.5 % — SIGNIFICANT CHANGE UP (ref 2–14)
NEUTROPHILS # BLD AUTO: 5.8 K/UL — SIGNIFICANT CHANGE UP (ref 1.8–7.4)
NEUTROPHILS NFR BLD AUTO: 75.7 % — SIGNIFICANT CHANGE UP (ref 43–77)
PHOSPHATE SERPL-MCNC: 3.7 MG/DL — SIGNIFICANT CHANGE UP (ref 2.5–4.5)
PHOSPHATE SERPL-MCNC: 4 MG/DL — SIGNIFICANT CHANGE UP (ref 2.5–4.5)
PLATELET # BLD AUTO: 356 K/UL — SIGNIFICANT CHANGE UP (ref 150–400)
POTASSIUM SERPL-MCNC: 4.2 MMOL/L — SIGNIFICANT CHANGE UP (ref 3.5–5.3)
POTASSIUM SERPL-MCNC: 4.3 MMOL/L — SIGNIFICANT CHANGE UP (ref 3.5–5.3)
POTASSIUM SERPL-SCNC: 4.2 MMOL/L — SIGNIFICANT CHANGE UP (ref 3.5–5.3)
POTASSIUM SERPL-SCNC: 4.3 MMOL/L — SIGNIFICANT CHANGE UP (ref 3.5–5.3)
PROT SERPL-MCNC: 7.4 G/DL — SIGNIFICANT CHANGE UP (ref 6–8.3)
PROT SERPL-MCNC: 7.5 G/DL — SIGNIFICANT CHANGE UP (ref 6–8.3)
PROTHROM AB SERPL-ACNC: 12.8 SEC — SIGNIFICANT CHANGE UP (ref 10–12.9)
RBC # BLD: 2.83 M/UL — LOW (ref 3.8–5.2)
RBC # FLD: 18.1 % — HIGH (ref 10.3–14.5)
SODIUM SERPL-SCNC: 141 MMOL/L — SIGNIFICANT CHANGE UP (ref 135–145)
SODIUM SERPL-SCNC: 142 MMOL/L — SIGNIFICANT CHANGE UP (ref 135–145)
TROPONIN T, HIGH SENSITIVITY RESULT: 305 NG/L — HIGH (ref 0–51)
WBC # BLD: 7.7 K/UL — SIGNIFICANT CHANGE UP (ref 3.8–10.5)
WBC # FLD AUTO: 7.7 K/UL — SIGNIFICANT CHANGE UP (ref 3.8–10.5)

## 2019-07-06 PROCEDURE — 71045 X-RAY EXAM CHEST 1 VIEW: CPT | Mod: 26

## 2019-07-06 PROCEDURE — 93010 ELECTROCARDIOGRAM REPORT: CPT | Mod: 76

## 2019-07-06 PROCEDURE — 99291 CRITICAL CARE FIRST HOUR: CPT

## 2019-07-06 RX ORDER — FUROSEMIDE 40 MG
40 TABLET ORAL EVERY 12 HOURS
Refills: 0 | Status: DISCONTINUED | OUTPATIENT
Start: 2019-07-06 | End: 2019-07-06

## 2019-07-06 RX ORDER — METOPROLOL TARTRATE 50 MG
12.5 TABLET ORAL
Refills: 0 | Status: DISCONTINUED | OUTPATIENT
Start: 2019-07-06 | End: 2019-07-06

## 2019-07-06 RX ORDER — ONDANSETRON 8 MG/1
4 TABLET, FILM COATED ORAL ONCE
Refills: 0 | Status: COMPLETED | OUTPATIENT
Start: 2019-07-06 | End: 2019-07-06

## 2019-07-06 RX ORDER — METOPROLOL TARTRATE 50 MG
5 TABLET ORAL ONCE
Refills: 0 | Status: COMPLETED | OUTPATIENT
Start: 2019-07-06 | End: 2019-07-06

## 2019-07-06 RX ORDER — FUROSEMIDE 40 MG
5 TABLET ORAL
Qty: 500 | Refills: 0 | Status: DISCONTINUED | OUTPATIENT
Start: 2019-07-06 | End: 2019-07-07

## 2019-07-06 RX ADMIN — Medication 2: at 21:44

## 2019-07-06 RX ADMIN — CHLORHEXIDINE GLUCONATE 1 APPLICATION(S): 213 SOLUTION TOPICAL at 21:46

## 2019-07-06 RX ADMIN — Medication 100 MILLIGRAM(S): at 17:16

## 2019-07-06 RX ADMIN — Medication 50 MICROGRAM(S): at 05:47

## 2019-07-06 RX ADMIN — Medication 40 MILLIGRAM(S): at 17:16

## 2019-07-06 RX ADMIN — TICAGRELOR 90 MILLIGRAM(S): 90 TABLET ORAL at 17:16

## 2019-07-06 RX ADMIN — HEPARIN SODIUM 5000 UNIT(S): 5000 INJECTION INTRAVENOUS; SUBCUTANEOUS at 21:43

## 2019-07-06 RX ADMIN — Medication 12.5 MILLIGRAM(S): at 12:22

## 2019-07-06 RX ADMIN — Medication 40 MILLIGRAM(S): at 05:47

## 2019-07-06 RX ADMIN — SENNA PLUS 2 TABLET(S): 8.6 TABLET ORAL at 21:44

## 2019-07-06 RX ADMIN — Medication 1: at 17:15

## 2019-07-06 RX ADMIN — Medication 2: at 08:30

## 2019-07-06 RX ADMIN — TICAGRELOR 90 MILLIGRAM(S): 90 TABLET ORAL at 05:46

## 2019-07-06 RX ADMIN — Medication 2.5 MG/HR: at 20:04

## 2019-07-06 RX ADMIN — ATORVASTATIN CALCIUM 40 MILLIGRAM(S): 80 TABLET, FILM COATED ORAL at 21:44

## 2019-07-06 RX ADMIN — Medication 10 UNIT(S): at 17:16

## 2019-07-06 RX ADMIN — Medication 10 UNIT(S): at 08:31

## 2019-07-06 RX ADMIN — INSULIN GLARGINE 25 UNIT(S): 100 INJECTION, SOLUTION SUBCUTANEOUS at 21:44

## 2019-07-06 RX ADMIN — Medication 2: at 12:10

## 2019-07-06 RX ADMIN — LIDOCAINE 1 PATCH: 4 CREAM TOPICAL at 11:09

## 2019-07-06 RX ADMIN — HEPARIN SODIUM 5000 UNIT(S): 5000 INJECTION INTRAVENOUS; SUBCUTANEOUS at 14:44

## 2019-07-06 RX ADMIN — MONTELUKAST 10 MILLIGRAM(S): 4 TABLET, CHEWABLE ORAL at 12:23

## 2019-07-06 RX ADMIN — Medication 81 MILLIGRAM(S): at 12:23

## 2019-07-06 RX ADMIN — LIDOCAINE 1 PATCH: 4 CREAM TOPICAL at 07:00

## 2019-07-06 RX ADMIN — LIDOCAINE 1 PATCH: 4 CREAM TOPICAL at 21:46

## 2019-07-06 RX ADMIN — PANTOPRAZOLE SODIUM 40 MILLIGRAM(S): 20 TABLET, DELAYED RELEASE ORAL at 05:56

## 2019-07-06 RX ADMIN — Medication 5 MILLIGRAM(S): at 07:11

## 2019-07-06 RX ADMIN — Medication 100 MILLIGRAM(S): at 05:47

## 2019-07-06 RX ADMIN — HEPARIN SODIUM 5000 UNIT(S): 5000 INJECTION INTRAVENOUS; SUBCUTANEOUS at 05:46

## 2019-07-06 RX ADMIN — Medication 10 UNIT(S): at 12:21

## 2019-07-06 RX ADMIN — ONDANSETRON 4 MILLIGRAM(S): 8 TABLET, FILM COATED ORAL at 07:20

## 2019-07-06 NOTE — PROGRESS NOTE ADULT - ASSESSMENT
CCU Fellow Addendum    - Pt reported dyspnea earlier in evening, was briefly on BIPAP but now on nasal canula. Repeat CXR showed improvement of pulmonary edema.  - Diuresis with Lasix gtt currently at 5. Net -1.5 L overall, continue to monitor urine output.  - c/w ASA, Brilinta, Lipitor  - Monitor FS on current insulin regimen  - Cr is rising. Will send urine studies and monitor    Reji Leon MD  Cardiology Fellow  [phone]  All Cardiology service information can be found 24/7 on [email or web address], password: [employer] CCU Fellow Addendum    - Pt reported dyspnea earlier in evening, was briefly on BIPAP but now on nasal canula. Repeat CXR showed improvement of pulmonary edema.  - Diuresis with Lasix gtt currently at 5. Net -1.5 L overall, continue to monitor urine output.  - Will add beta blocker if BP tolerates  - c/w ASA, Brilinta, Lipitor  - Monitor FS on current insulin regimen  - Cr is rising. Will send urine studies and monitor    Reji Leon MD  Cardiology Fellow  [phone]  All Cardiology service information can be found 24/7 on [email or web address], password: [email or web address]

## 2019-07-06 NOTE — PROGRESS NOTE ADULT - SUBJECTIVE AND OBJECTIVE BOX
CHIEF COMPLAINT:Patient is a 73y old  Female who presents with a chief complaint of Hypoxia, SOB (05 Jul 2019 22:22)  patient was seen in CCU her daughter at the bedside condition improved she had one event of sinus tachycardia which responded to Lopressor (she was seen at 1 PM)   72 yo woman with PMHx of HTN, T2DM, CAD s/p CABG (LIMA to LAD, SVG to OM, SVG to PDA 2014 at [facility]), non-dilated ICM (EF 20-25%), severe mitral regurgitation s/p mitral clip (6/13/19 Dr. Newman), severe pulm HTN, CKD, hypothyroidism, who is presenting to ED after experiencing worsening SOB and intermittent chest pain at [facility]. In ED, pt was placed on BiPAP and given lasix 40mg IVPx1.  Transferred to CCU for further management  SUBJECTIVE:     REVIEW OF SYSTEMS:    CONSTITUTIONAL: ( x )  weakness,  (  ) fevers or chills  EYES/ENT: (  )visual changes;     NECK: (  ) pain or stiffness  RESPIRATORY:   (  )cough, wheezing, hemoptysis;  ( x ) shortness of breath  CARDIOVASCULAR:  (  )chest pain or palpitations  GASTROINTESTINAL:   (  )abdominal or epigastric pain.  (  ) nausea, vomiting, or hematemesis;   (   ) diarrhea or constipation.   GENITOURINARY:   (    ) dysuria, frequency or hematuria  NEUROLOGICAL:  (   ) numbness or weakness   All other review of systems is negative unless indicated above    Vital Signs Last 24 Hrs  T(C): 36.4 (06 Jul 2019 08:00), Max: 36.8 (06 Jul 2019 00:00)  T(F): 97.6 (06 Jul 2019 08:00), Max: 98.2 (06 Jul 2019 00:00)  HR: 72 (06 Jul 2019 16:55) (70 - 162)  BP: 83/49 (06 Jul 2019 16:55) (72/46 - 107/60)  BP(mean): 59 (06 Jul 2019 16:55) (51 - 83)  RR: 22 (06 Jul 2019 16:55) (21 - 50)  SpO2: 98% (06 Jul 2019 16:55) (94% - 100%)    I&O's Summary    05 Jul 2019 07:01  -  06 Jul 2019 07:00  --------------------------------------------------------  IN: 340 mL / OUT: 735 mL / NET: -395 mL    06 Jul 2019 07:01  -  06 Jul 2019 17:59  --------------------------------------------------------  IN: 170 mL / OUT: 510 mL / NET: -340 mL        CAPILLARY BLOOD GLUCOSE      POCT Blood Glucose.: 199 mg/dL (06 Jul 2019 17:14)  POCT Blood Glucose.: 218 mg/dL (06 Jul 2019 12:09)  POCT Blood Glucose.: 223 mg/dL (06 Jul 2019 08:29)      PHYSICAL EXAM:    Constitutional:  (  x ) NAD,   (   )awake and alert  HEENT: PERR, EOMI,    Neck: Soft and supple, No LAD, No JVD  Respiratory:  (  x  Breath sounds are clear bilaterally,    (  x )  rales at the bases but improved significantlyrhonchi  Cardiovascular:     (  x )S1 and S2, regular rate and rhythm, no Murmurs, gallops or rubs  Gastrointestinal:  ( x  )Bowel Sounds present, soft,   (x  )nontender, nondistended,    Extremities:    (  ) peripheral edema  Vascular: 2+ peripheral pulses  Neurological:    (  x  )A/O x 3,   (  ) focal deficits  Musculoskeletal:    (   )  normal strength b/l upper  (     ) normal  lower extremities  Skin: No rashes    MEDICATIONS:  MEDICATIONS  (STANDING):  aspirin enteric coated 81 milliGRAM(s) Oral daily  atorvastatin 40 milliGRAM(s) Oral at bedtime  chlorhexidine 2% Cloths 1 Application(s) Topical daily  dextrose 5%. 1000 milliLiter(s) (50 mL/Hr) IV Continuous <Continuous>  dextrose 50% Injectable 12.5 Gram(s) IV Push once  dextrose 50% Injectable 25 Gram(s) IV Push once  dextrose 50% Injectable 25 Gram(s) IV Push once  docusate sodium 100 milliGRAM(s) Oral two times a day  furosemide   Injectable 40 milliGRAM(s) IV Push every 12 hours  heparin  Injectable 5000 Unit(s) SubCutaneous every 8 hours  insulin glargine Injectable (LANTUS) 25 Unit(s) SubCutaneous at bedtime  insulin lispro (HumaLOG) corrective regimen sliding scale   SubCutaneous three times a day before meals  insulin lispro (HumaLOG) corrective regimen sliding scale   SubCutaneous at bedtime  insulin lispro Injectable (HumaLOG) 10 Unit(s) SubCutaneous three times a day before meals  levothyroxine 50 MICROGram(s) Oral daily  lidocaine   Patch 1 Patch Transdermal daily  metoprolol tartrate 12.5 milliGRAM(s) Oral two times a day  montelukast 10 milliGRAM(s) Oral daily  pantoprazole    Tablet 40 milliGRAM(s) Oral before breakfast  senna 2 Tablet(s) Oral at bedtime  ticagrelor 90 milliGRAM(s) Oral every 12 hours      LABS: All Labs Reviewed:                        8.4    7.7   )-----------( 356      ( 06 Jul 2019 05:24 )             25.2     07-06    141  |  102  |  50<H>  ----------------------------<  150<H>  4.3   |  21<L>  |  2.13<H>    Ca    8.8      06 Jul 2019 15:40  Phos  4.0     07-06  Mg     2.2     07-06    TPro  7.5  /  Alb  3.5  /  TBili  0.3  /  DBili  x   /  AST  24  /  ALT  30  /  AlkPhos  160<H>  07-06    PT/INR - ( 06 Jul 2019 05:24 )   PT: 12.8 sec;   INR: 1.12 ratio         PTT - ( 06 Jul 2019 05:24 )  PTT:35.2 sec  CARDIAC MARKERS ( 06 Jul 2019 15:40 )  x     / x     / 46 U/L / x     / 2.8 ng/mL  CARDIAC MARKERS ( 05 Jul 2019 20:42 )  x     / x     / 65 U/L / x     / 5.5 ng/mL      Blood Culture:   Urine Culture      RADIOLOGY/EKG:    ASSESSMENT AND PLAN:   72 yo woman with PMHx of HTN, T2DM, CAD s/p CABG (LIMA to LAD, SVG to OM, SVG to PDA 2014 at [facility]), non-dilated ICM (EF 20-25%), severe mitral regurgitation s/p mitral clip (6/13/19 Dr. Newman), severe pulm HTN, CKD, hypothyroidism, who is presenting to ED after experiencing worsening SOB and intermittent chest pain at [facility]. In ED, pt was placed on BiPAP and given lasix 40mg IVPx1.  Transferred to CCU for further management. Currently responding to diuresis    #congestive heart failure/pulmonary edema  Acute decompensated heart failure/volume   -TTE 7/5 showed EF 15-20% with global LV systolic dysfunction  -s/p lasix 40mg IVP x1, to put on lasic BID.  patient was on bumex 2mg, then 1mg at night  while she was in rehabilitation  -to consider lasix TID if UOP insufficient  -Strict I/O, trend weights  -Trending cardiac enzymes. 292 ->330 -> 412     #CAD/sinus tachycardia  -ASA, brilinta, statin  -lopressor 12.5   episode of tachycardia this morning to 160's. Resolved post 5mg lopressor     #pulmonary hypertension     -off bi-pap  -sating well on NC  -continue to trend SpO2     #  END DM, hyperglycemia/hypothyroidism  -Hgb A1C 7.4  -started Lantus 25U at bedtime  -started ISS pre meal and at bedtime  -Started lispro 10U pre meal    -On synthroid  -TSH 2.0  -T4, Serum: 6.8 ug/dL (06.12.19 @ 15:17)     # CKD stage IV IV   -New baseline of 1.8-2.0 as per nephro  -To trend renal function     #anemia  Chronic in nature monitor CBC     #anxiety discussed with medical resident and her daughter and patient in detail will not use any medication at present time       DVT PPX:    ADVANCED DIRECTIVE:    DISPOSITION:

## 2019-07-06 NOTE — PROGRESS NOTE ADULT - SUBJECTIVE AND OBJECTIVE BOX
Dr. Muhammad  Office [phone]  Cell [phone]  [facility]  Cell [phone]      Patient is a 73y old  Female who presents with a chief complaint of Hypoxia, SOB (06 Jul 2019 05:45)      Patient seen and examined at bedside. No chest pain/sob    VITALS:  T(F): 97.6 (07-06-19 @ 08:00), Max: 99.6 (07-05-19 @ 14:06)  HR: 76 (07-06-19 @ 09:55)  BP: 90/50 (07-06-19 @ 09:55)  RR: 30 (07-06-19 @ 09:55)  SpO2: 100% (07-06-19 @ 09:55)  Wt(kg): --    07-05 @ 07:01  -  07-06 @ 07:00  --------------------------------------------------------  IN: 340 mL / OUT: 735 mL / NET: -395 mL    07-06 @ 07:01  -  07-06 @ 12:07  --------------------------------------------------------  IN: 50 mL / OUT: 325 mL / NET: -275 mL      Height (cm): 129.54 (07-05 @ 19:58)  Weight (kg): 55.4 (07-05 @ 19:58)  BMI (kg/m2): 33 (07-05 @ 19:58)  BSA (m2): 1.35 (07-05 @ 19:58)    PHYSICAL EXAM:  Constitutional: NAD  Neck: No JVD  Respiratory: bilateral decreased air entry  Cardiovascular: S1, S2, RRR  Gastrointestinal: BS+, soft, NT/ND  Extremities: No peripheral edema    Hospital Medications:   MEDICATIONS  (STANDING):  aspirin enteric coated 81 milliGRAM(s) Oral daily  atorvastatin 40 milliGRAM(s) Oral at bedtime  chlorhexidine 2% Cloths 1 Application(s) Topical daily  dextrose 5%. 1000 milliLiter(s) (50 mL/Hr) IV Continuous <Continuous>  dextrose 50% Injectable 12.5 Gram(s) IV Push once  dextrose 50% Injectable 25 Gram(s) IV Push once  dextrose 50% Injectable 25 Gram(s) IV Push once  docusate sodium 100 milliGRAM(s) Oral two times a day  furosemide   Injectable 40 milliGRAM(s) IV Push every 12 hours  heparin  Injectable 5000 Unit(s) SubCutaneous every 8 hours  insulin glargine Injectable (LANTUS) 25 Unit(s) SubCutaneous at bedtime  insulin lispro (HumaLOG) corrective regimen sliding scale   SubCutaneous three times a day before meals  insulin lispro (HumaLOG) corrective regimen sliding scale   SubCutaneous at bedtime  insulin lispro Injectable (HumaLOG) 10 Unit(s) SubCutaneous three times a day before meals  levothyroxine 50 MICROGram(s) Oral daily  lidocaine   Patch 1 Patch Transdermal daily  metoprolol tartrate 12.5 milliGRAM(s) Oral two times a day  montelukast 10 milliGRAM(s) Oral daily  pantoprazole    Tablet 40 milliGRAM(s) Oral before breakfast  senna 2 Tablet(s) Oral at bedtime  ticagrelor 90 milliGRAM(s) Oral every 12 hours      LABS:  07-06    142  |  103  |  46<H>  ----------------------------<  195<H>  4.2   |  23  |  1.93<H>    Ca    9.2      06 Jul 2019 05:24  Phos  3.7     07-06  Mg     2.2     07-06    TPro  7.4  /  Alb  3.6  /  TBili  0.3  /  DBili      /  AST  19  /  ALT  28  /  AlkPhos  137<H>  07-06    Creatinine Trend: 1.93 <--, 1.89 <--, 1.72 <--    Albumin, Serum: 3.6 g/dL (07-06 @ 05:24)  Phosphorus Level, Serum: 3.7 mg/dL (07-06 @ 05:24)  Ferritin, Serum: 130 ng/mL (07-05 @ 22:32)  Iron Total, Serum: 42 ug/dL (07-05 @ 22:32)  Iron - Total Binding Capacity.: 348 ug/dL (07-05 @ 22:32)  Albumin, Serum: 3.8 g/dL (07-05 @ 20:42)  Phosphorus Level, Serum: 3.8 mg/dL (07-05 @ 20:42)  Albumin, Serum: 3.8 g/dL (07-05 @ 12:43)                              8.4    7.7   )-----------( 356      ( 06 Jul 2019 05:24 )             25.2     Urine Studies:  Urinalysis - [06-14-19 @ 16:33]      Color Light Yellow / Appearance Clear / SG 1.016 / pH 5.5      Gluc 500 mg/dL / Ketone Negative  / Bili Negative / Urobili Negative       Blood Trace / Protein 30 mg/dL / Leuk Est Small / Nitrite Negative      RBC 10 / WBC 10 / Hyaline 2 / Gran  / Sq Epi  / Non Sq Epi 1 / Bacteria Negative      Iron 42, TIBC 348, %sat 12      [07-05-19 @ 22:32]  Ferritin 130      [07-05-19 @ 22:32]  .4 (Ca --)      [04-25-19 @ 05:45]   --  PTH 43.55 (Ca --)      [09-10-18 @ 07:15]   --  PTH 36.60 (Ca --)      [09-08-18 @ 03:15]   --  Vitamin D (25OH) 25.9      [05-01-19 @ 04:00]  HbA1c 7.4      [07-05-19 @ 22:32]  TSH 2.00      [07-05-19 @ 22:32]  Lipid: chol 148, , HDL 35,       [06-01-19 @ 08:00]        RADIOLOGY & ADDITIONAL STUDIES:

## 2019-07-06 NOTE — PROGRESS NOTE ADULT - ASSESSMENT
Pt is a 72 yo woman with PMHx of HTN, T2DM, CAD s/p CABG (LIMA to LAD, SVG to OM, SVG to PDA 2014 at [facility]), non-dilated ICM (EF 20-25%), severe mitral regurgitation s/p mitral clip (6/13/19 Dr. Newman), severe pulm HTN, CKD, hypothyroidism, who is presenting to ED after experiencing worsening SOB      A/P:  CKD stage 4:  Her new baseline Scr 1.8-2.0  Renal function fluctuates sec to CHF  Monitor renal function at present    SOB:  Feels better  Continue Diuresis per cardiology

## 2019-07-06 NOTE — PROGRESS NOTE ADULT - SUBJECTIVE AND OBJECTIVE BOX
====================  CCU MIDNIGHT ROUNDS  ====================    SELVIN CLAIRE  [id number]  Patient is a 73y old  Female who presents with a chief complaint of Hypoxia, SOB (06 Jul 2019 17:59)      ====================  SUMMARY: 74 yo woman with PMHx of HTN, T2DM, CAD s/p CABG (LIMA to LAD, SVG to OM, SVG to PDA 2014 at [facility]), non-dilated ICM (EF 20-25%), severe mitral regurgitation s/p mitral clip (6/13/19 Dr. Newman), severe pulm HTN, CKD, hypothyroidism, who is presenting to ED after experiencing worsening SOB and intermittent chest pain at [facility]. In ED, pt was placed on BiPAP and given lasix 40mg IVPx1.  Transferred to CCU for further management  ====================      ====================  NEW EVENTS: Pt became tachypneic and complaining of SOB. RLL rales noted. Pt placed on BiPAP and started on lasix 5mg/hr.  UOP 24hr Net (-)  ====================    MEDICATIONS  (STANDING):  aspirin enteric coated 81 milliGRAM(s) Oral daily  atorvastatin 40 milliGRAM(s) Oral at bedtime  chlorhexidine 2% Cloths 1 Application(s) Topical daily  dextrose 5%. 1000 milliLiter(s) (50 mL/Hr) IV Continuous <Continuous>  dextrose 50% Injectable 12.5 Gram(s) IV Push once  dextrose 50% Injectable 25 Gram(s) IV Push once  dextrose 50% Injectable 25 Gram(s) IV Push once  docusate sodium 100 milliGRAM(s) Oral two times a day  furosemide Infusion 5 mG/Hr (2.5 mL/Hr) IV Continuous <Continuous>  heparin  Injectable 5000 Unit(s) SubCutaneous every 8 hours  insulin glargine Injectable (LANTUS) 25 Unit(s) SubCutaneous at bedtime  insulin lispro (HumaLOG) corrective regimen sliding scale   SubCutaneous three times a day before meals  insulin lispro (HumaLOG) corrective regimen sliding scale   SubCutaneous at bedtime  insulin lispro Injectable (HumaLOG) 10 Unit(s) SubCutaneous three times a day before meals  levothyroxine 50 MICROGram(s) Oral daily  lidocaine   Patch 1 Patch Transdermal daily  montelukast 10 milliGRAM(s) Oral daily  pantoprazole    Tablet 40 milliGRAM(s) Oral before breakfast  senna 2 Tablet(s) Oral at bedtime  ticagrelor 90 milliGRAM(s) Oral every 12 hours    MEDICATIONS  (PRN):  acetaminophen   Tablet .. 650 milliGRAM(s) Oral every 6 hours PRN Mild Pain (1 - 3), Moderate Pain (4 - 6)  dextrose 40% Gel 15 Gram(s) Oral once PRN Blood Glucose LESS THAN 70 milliGRAM(s)/deciliter  glucagon  Injectable 1 milliGRAM(s) IntraMuscular once PRN Glucose LESS THAN 70 milligrams/deciliter      ====================  VITALS (Last 12 hrs):  ====================    T(C): 36.4 (07-06-19 @ 19:00), Max: 36.6 (07-06-19 @ 16:55)  T(F): 97.6 (07-06-19 @ 19:00), Max: 97.9 (07-06-19 @ 16:55)  HR: 76 (07-06-19 @ 20:00) (72 - 80)  BP: 105/57 (07-06-19 @ 20:00) (79/48 - 109/62)  BP(mean): 83 (07-06-19 @ 20:00) (56 - 84)  RR: 24 (07-06-19 @ 20:00) (21 - 36)  SpO2: 100% (07-06-19 @ 20:00) (94% - 100%)    I&O's Summary    05 Jul 2019 07:01  -  06 Jul 2019 07:00  --------------------------------------------------------  IN: 340 mL / OUT: 735 mL / NET: -395 mL    06 Jul 2019 07:01  -  06 Jul 2019 21:00  --------------------------------------------------------  IN: 290 mL / OUT: 950 mL / NET: -660 mL    ====================  NEW LABS:  ====================      07-06    141  |  102  |  50<H>  ----------------------------<  150<H>  4.3   |  21<L>  |  2.13<H>    Ca    8.8      06 Jul 2019 15:40  Phos  4.0     07-06  Mg     2.2     07-06    TPro  7.5  /  Alb  3.5  /  TBili  0.3  /  DBili  x   /  AST  24  /  ALT  30  /  AlkPhos  160<H>  07-06    PT/INR - ( 06 Jul 2019 05:24 )   PT: 12.8 sec;   INR: 1.12 ratio         PTT - ( 06 Jul 2019 05:24 )  PTT:35.2 sec  Creatine Kinase, Serum: 46 U/L (07-06-19 @ 15:40)    CKMB Units: 2.8 ng/mL (07-06 @ 15:40)    ====================  PLAN:  ====================  #Cardiac  Acute decompensated heart failure   -TTE 7/5 showed EF 15-20% with global LV systolic dysfunction  -c/w BIPAP IPAP 10 CPAP 5 FIO2 50%  -started lasix gtt @ 5mg/hr   -Strict I+O, O>I, daily weights  -holding home BP meds due to soft BP, didn't tolerated lopressor in the AM    #Renal  Acute on chronic kidney disease  -Trending BUN/SCr, SCr 2.13<1.93, new baseline 1.8-2.0  -Monitor UOP  -avoid nephrotoxic meds, renally dose  -f/u Renal recs    #Endo  DM, hyperglycemia  -Hgb A1C 7.4, FS downtrending   -c/w Lantus 25U at bedtime  -c/w ISS pre meal and at bedtime  -c/w lispro 10U pre meal     Mecca Prado CCU NP  Beeper #[id number]  Spectra # [phone]

## 2019-07-06 NOTE — PROGRESS NOTE ADULT - ASSESSMENT
A/P:      Pulm  No active issues    Cardiac  No active issues    GI  No active issues    Neuro  No active issues    Renal  No active issues    Endo  No active issues    Heme  No active issues    PPx: HSQ; Protonix;   FEN: none; replete electrolytes PRN; NPO  Code status: Full      Dispo: c/w care on ICU  Oscar:  Access/Lines: A/P:    Cardiac  Acute decompensated heart failure   -TTE 7/5 showed EF 15-20% with global LV systolic dysfunction  -Off bipap, sating well on NC  -s/p lasix 40mg IVP x1, to put on lasic BID. on bumex 2mg BID at home  -Strict I/O, trend weights  -holding home BP meds due to soft BP overnight  -ASA, statin    Endo  DM, hyperglycemia  -Hgb A1C 7.4  -started Lantus 25U at bedtime  -started ISS pre meal and at bedtime  -Started lispro 10U pre meal A/P:    Miss Gamino is 74 yo woman with PMHx of HTN, T2DM, CAD s/p CABG (LIMA to LAD, SVG to OM, SVG to PDA 2014 at [facility]), non-dilated ICM (EF 20-25%), severe mitral regurgitation s/p mitral clip (6/13/19 Dr. Newman), severe pulm HTN, CKD, hypothyroidism, who is presenting to ED after experiencing worsening SOB and intermittent chest pain at [facility]. In ED, pt was placed on BiPAP and given lasix 40mg IVPx1.  Transferred to CCU for further management      Cardiac  Acute decompensated heart failure   -TTE 7/5 showed EF 15-20% with global LV systolic dysfunction  -Off bipap, sating well on NC  -s/p lasix 40mg IVP x1, to put on lasic BID. on bumex 2mg BID at home  -Strict I/O, trend weights  -holding home BP meds due to soft BP overnight  -ASA, statin    Endo  DM, hyperglycemia  -Hgb A1C 7.4  -started Lantus 25U at bedtime  -started ISS pre meal and at bedtime  -Started lispro 10U pre meal A/P:    Miss Gamino is 72 yo woman with PMHx of HTN, T2DM, CAD s/p CABG (LIMA to LAD, SVG to OM, SVG to PDA 2014 at [facility]), non-dilated ICM (EF 20-25%), severe mitral regurgitation s/p mitral clip (6/13/19 Dr. Newman), severe pulm HTN, CKD, hypothyroidism, who is presenting to ED after experiencing worsening SOB and intermittent chest pain at [facility]. In ED, pt was placed on BiPAP and given lasix 40mg IVPx1.  Transferred to CCU for further management. Currently responding to diuresis    Cardiac  Acute decompensated heart failure/volume   -TTE 7/5 showed EF 15-20% with global LV systolic dysfunction  -s/p lasix 40mg IVP x1, to put on lasic BID. on bumex 2mg, then 1mg at night   -to consider lasix TID if UOP insufficient  -Strict I/O, trend weights  -Trending cardiac enzymes. 292 ->330 -> 412    Cardioprotection  -ASA, brilinta, statin  -lopressor 12.5  -DM management as below  -to trend bp's    EP  -episode of tachycardia this morning to 160's. Resolved post 5mg lopressor  -On lopressor 12.5  -On tele  -Continue to monitor      Pulmonary  -off bi-pap  -sating well on NC  -continue to trend SpO2    Endo  DM, hyperglycemia  -Hgb A1C 7.4  -started Lantus 25U at bedtime  -started ISS pre meal and at bedtime  -Started lispro 10U pre meal    Hypothyroidism  -On synthroid  -TSH 2.0  -T4, Serum: 6.8 ug/dL (06.12.19 @ 15:17)    Renal, CKD IV   -New baseline of 1.8-2.0 as per nephro  -To trend renal function    GI  -no acute issues  -tolerating po    ID  -afebrile  -no WBC count  -no s and s of infection  -to continue monitoring for signs of infection    Heme  -anemia, chronic  -b/l appx 8-9  -to trend

## 2019-07-06 NOTE — PROGRESS NOTE ADULT - SUBJECTIVE AND OBJECTIVE BOX
PATIENT:  SELVIN CLAIRE  [id number]    CHIEF COMPLAINT:  Patient is a 73y old  Female who presents with a chief complaint of Hypoxia, SOB (05 Jul 2019 22:22)      INTERVAL HISTORYOVERNIGHT EVENTS:      REVIEW OF SYSTEMS:    Constitutional:     [ ] negative [ ] fevers [ ] chills [ ] weight loss [ ] weight gain  HEENT:                  [ ] negative [ ] dry eyes [ ] eye irritation [ ] postnasal drip [ ] nasal congestion  CV:                         [ ] negative  [ ] chest pain [ ] orthopnea [ ] palpitations [ ] murmur  Resp:                     [ ] negative [ ] cough [ ] shortness of breath [ ] dyspnea [ ] wheezing [ ] sputum [ ] hemoptysis  GI:                          [ ] negative [ ] nausea [ ] vomiting [ ] diarrhea [ ] constipation [ ] abd pain [ ] dysphagia   :                        [ ] negative [ ] dysuria [ ] nocturia [ ] hematuria [ ] increased urinary frequency  Musculoskeletal: [ ] negative [ ] back pain [ ] myalgias [ ] arthralgias [ ] fracture  Skin:                       [ ] negative [ ] rash [ ] itch  Neurological:        [ ] negative [ ] headache [ ] dizziness [ ] syncope [ ] weakness [ ] numbness  Psychiatric:           [ ] negative [ ] anxiety [ ] depression  Endocrine:            [ ] negative [ ] diabetes [ ] thyroid problem  Heme/Lymph:      [ ] negative [ ] anemia [ ] bleeding problem  Allergic/Immune: [ ] negative [ ] itchy eyes [ ] nasal discharge [ ] hives [ ] angioedema    [ ] All other systems negative  [ ] Unable to assess ROS because ________.    MEDICATIONS:  MEDICATIONS  (STANDING):  aspirin enteric coated 81 milliGRAM(s) Oral daily  atorvastatin 40 milliGRAM(s) Oral at bedtime  chlorhexidine 2% Cloths 1 Application(s) Topical daily  dextrose 5%. 1000 milliLiter(s) (50 mL/Hr) IV Continuous <Continuous>  dextrose 50% Injectable 12.5 Gram(s) IV Push once  dextrose 50% Injectable 25 Gram(s) IV Push once  dextrose 50% Injectable 25 Gram(s) IV Push once  docusate sodium 100 milliGRAM(s) Oral two times a day  furosemide   Injectable 40 milliGRAM(s) IV Push every 12 hours  heparin  Injectable 5000 Unit(s) SubCutaneous every 8 hours  insulin glargine Injectable (LANTUS) 25 Unit(s) SubCutaneous at bedtime  insulin lispro (HumaLOG) corrective regimen sliding scale   SubCutaneous three times a day before meals  insulin lispro (HumaLOG) corrective regimen sliding scale   SubCutaneous at bedtime  insulin lispro Injectable (HumaLOG) 10 Unit(s) SubCutaneous three times a day before meals  levothyroxine 50 MICROGram(s) Oral daily  lidocaine   Patch 1 Patch Transdermal daily  montelukast 10 milliGRAM(s) Oral daily  pantoprazole    Tablet 40 milliGRAM(s) Oral before breakfast  senna 2 Tablet(s) Oral at bedtime  ticagrelor 90 milliGRAM(s) Oral every 12 hours    MEDICATIONS  (PRN):  acetaminophen   Tablet .. 650 milliGRAM(s) Oral every 6 hours PRN Mild Pain (1 - 3), Moderate Pain (4 - 6)  dextrose 40% Gel 15 Gram(s) Oral once PRN Blood Glucose LESS THAN 70 milliGRAM(s)/deciliter  glucagon  Injectable 1 milliGRAM(s) IntraMuscular once PRN Glucose LESS THAN 70 milligrams/deciliter      ALLERGIES:  Allergies    azithromycin (Hives; Pruritus)    Intolerances        OBJECTIVE:  ICU Vital Signs Last 24 Hrs  T(C): 36.8 (06 Jul 2019 00:00), Max: 37.6 (05 Jul 2019 14:06)  T(F): 98.2 (06 Jul 2019 00:00), Max: 99.6 (05 Jul 2019 14:06)  HR: 78 (06 Jul 2019 04:00) (72 - 108)  BP: 90/50 (06 Jul 2019 04:00) (72/46 - 121/75)  BP(mean): 64 (06 Jul 2019 04:00) (51 - 83)  ABP: --  ABP(mean): --  RR: 41 (06 Jul 2019 04:00) (20 - 50)  SpO2: 99% (06 Jul 2019 04:00) (96% - 100%)      Adult Advanced Hemodynamics Last 24 Hrs  CVP(mm Hg): --  CVP(cm H2O): --  CO: --  CI: --  PA: --  PA(mean): --  PCWP: --  SVR: --  SVRI: --  PVR: --  PVRI: --  CAPILLARY BLOOD GLUCOSE        CAPILLARY BLOOD GLUCOSE        I&O's Summary    05 Jul 2019 07:01  -  06 Jul 2019 05:46  --------------------------------------------------------  IN: 220 mL / OUT: 525 mL / NET: -305 mL      Daily Height in cm: 129.54 (05 Jul 2019 19:58)    Daily     PHYSICAL EXAMINATION:  General: WN/WD NAD  HEENT: PERRLA, EOMI, moist mucous membranes  Neurology: A&Ox3, nonfocal, CUADRA x 4  Respiratory: CTA B/L, normal respiratory effort, no wheezes, crackles, rales  CV: RRR, S1S2, no murmurs, rubs or gallops  Abdominal: Soft, NT, ND +BS, Last BM  Extremities: No edema, + peripheral pulses  Incisions:   Tubes:    LABS:                          8.7    9.1   )-----------( 388      ( 05 Jul 2019 20:42 )             27.0     07-05    142  |  105  |  41<H>  ----------------------------<  177<H>  4.3   |  23  |  1.89<H>    Ca    9.6      05 Jul 2019 20:42  Phos  3.8     07-05  Mg     2.1     07-05    TPro  7.9  /  Alb  3.8  /  TBili  0.4  /  DBili  x   /  AST  21  /  ALT  30  /  AlkPhos  154<H>  07-05    LIVER FUNCTIONS - ( 05 Jul 2019 20:42 )  Alb: 3.8 g/dL / Pro: 7.9 g/dL / ALK PHOS: 154 U/L / ALT: 30 U/L / AST: 21 U/L / GGT: x           PT/INR - ( 05 Jul 2019 20:42 )   PT: 12.9 sec;   INR: 1.13 ratio         PTT - ( 05 Jul 2019 20:42 )  PTT:37.1 sec  CKMB Units: 5.5 ng/mL (07-05 @ 20:42)  Creatine Kinase, Serum: 65 U/L (07-05 @ 20:42)    CARDIAC MARKERS ( 05 Jul 2019 20:42 )  x     / x     / 65 U/L / x     / 5.5 ng/mL          TELEMETRY:     EKG:     IMAGING: PATIENT:  SELVIN CLAIRE  [id number]    CHIEF COMPLAINT:  Patient is a 73y old  Female who presents with a chief complaint of Hypoxia, SOB (05 Jul 2019 22:22)    Miss Claire is 74 yo woman with PMHx of HTN, T2DM, CAD s/p CABG (LIMA to LAD, SVG to OM, SVG to PDA 2014 at [facility]), non-dilated ICM (EF 20-25%), severe mitral regurgitation s/p mitral clip (6/13/19 Dr. Newman), severe pulm HTN, CKD, hypothyroidism, who is presenting to ED after experiencing worsening SOB and intermittent chest pain at [facility]. In ED, pt was placed on BiPAP and given lasix 40mg IVPx1.  Transferred to CCU for further management    INTERVAL HISTORYOVERNIGHT EVENTS:  Sinus tachy to 160s this AM. Gave lopressor 5mg. Resolved.    REVIEW OF SYSTEMS:    Constitutional:     [ ] negative [ ] fevers [ ] chills [ ] weight loss [ ] weight gain  HEENT:                  [ ] negative [ ] dry eyes [ ] eye irritation [ ] postnasal drip [ ] nasal congestion  CV:                         [ ] negative  [ ] chest pain [ ] orthopnea [ ] palpitations [ ] murmur  Resp:                     [ ] negative [ ] cough [ ] shortness of breath [ ] dyspnea [ ] wheezing [ ] sputum [ ] hemoptysis  GI:                          [ ] negative [ ] nausea [ ] vomiting [ ] diarrhea [ ] constipation [ ] abd pain [ ] dysphagia   :                        [ ] negative [ ] dysuria [ ] nocturia [ ] hematuria [ ] increased urinary frequency  Musculoskeletal: [ ] negative [ ] back pain [ ] myalgias [ ] arthralgias [ ] fracture  Skin:                       [ ] negative [ ] rash [ ] itch  Neurological:        [ ] negative [ ] headache [ ] dizziness [ ] syncope [ ] weakness [ ] numbness  Psychiatric:           [ ] negative [ ] anxiety [ ] depression  Endocrine:            [ ] negative [ ] diabetes [ ] thyroid problem  Heme/Lymph:      [ ] negative [ ] anemia [ ] bleeding problem  Allergic/Immune: [ ] negative [ ] itchy eyes [ ] nasal discharge [ ] hives [ ] angioedema    [ ] All other systems negative  [ ] Unable to assess ROS because ________.    MEDICATIONS:  MEDICATIONS  (STANDING):  aspirin enteric coated 81 milliGRAM(s) Oral daily  atorvastatin 40 milliGRAM(s) Oral at bedtime  chlorhexidine 2% Cloths 1 Application(s) Topical daily  dextrose 5%. 1000 milliLiter(s) (50 mL/Hr) IV Continuous <Continuous>  dextrose 50% Injectable 12.5 Gram(s) IV Push once  dextrose 50% Injectable 25 Gram(s) IV Push once  dextrose 50% Injectable 25 Gram(s) IV Push once  docusate sodium 100 milliGRAM(s) Oral two times a day  furosemide   Injectable 40 milliGRAM(s) IV Push every 12 hours  heparin  Injectable 5000 Unit(s) SubCutaneous every 8 hours  insulin glargine Injectable (LANTUS) 25 Unit(s) SubCutaneous at bedtime  insulin lispro (HumaLOG) corrective regimen sliding scale   SubCutaneous three times a day before meals  insulin lispro (HumaLOG) corrective regimen sliding scale   SubCutaneous at bedtime  insulin lispro Injectable (HumaLOG) 10 Unit(s) SubCutaneous three times a day before meals  levothyroxine 50 MICROGram(s) Oral daily  lidocaine   Patch 1 Patch Transdermal daily  montelukast 10 milliGRAM(s) Oral daily  pantoprazole    Tablet 40 milliGRAM(s) Oral before breakfast  senna 2 Tablet(s) Oral at bedtime  ticagrelor 90 milliGRAM(s) Oral every 12 hours    MEDICATIONS  (PRN):  acetaminophen   Tablet .. 650 milliGRAM(s) Oral every 6 hours PRN Mild Pain (1 - 3), Moderate Pain (4 - 6)  dextrose 40% Gel 15 Gram(s) Oral once PRN Blood Glucose LESS THAN 70 milliGRAM(s)/deciliter  glucagon  Injectable 1 milliGRAM(s) IntraMuscular once PRN Glucose LESS THAN 70 milligrams/deciliter      ALLERGIES:  Allergies    azithromycin (Hives; Pruritus)    Intolerances        OBJECTIVE:  ICU Vital Signs Last 24 Hrs  T(C): 36.8 (06 Jul 2019 00:00), Max: 37.6 (05 Jul 2019 14:06)  T(F): 98.2 (06 Jul 2019 00:00), Max: 99.6 (05 Jul 2019 14:06)  HR: 78 (06 Jul 2019 04:00) (72 - 108)  BP: 90/50 (06 Jul 2019 04:00) (72/46 - 121/75)  BP(mean): 64 (06 Jul 2019 04:00) (51 - 83)  ABP: --  ABP(mean): --  RR: 41 (06 Jul 2019 04:00) (20 - 50)  SpO2: 99% (06 Jul 2019 04:00) (96% - 100%)      Adult Advanced Hemodynamics Last 24 Hrs  CVP(mm Hg): --  CVP(cm H2O): --  CO: --  CI: --  PA: --  PA(mean): --  PCWP: --  SVR: --  SVRI: --  PVR: --  PVRI: --  CAPILLARY BLOOD GLUCOSE        CAPILLARY BLOOD GLUCOSE        I&O's Summary    05 Jul 2019 07:01  -  06 Jul 2019 05:46  --------------------------------------------------------  IN: 220 mL / OUT: 525 mL / NET: -305 mL      Daily Height in cm: 129.54 (05 Jul 2019 19:58)    Daily     PHYSICAL EXAMINATION:  General: WN/WD NAD  HEENT: PERRLA, EOMI, moist mucous membranes  Neurology: A&Ox3, nonfocal, CUADRA x 4  Respiratory: CTA B/L, normal respiratory effort, no wheezes, crackles, rales  CV: RRR, S1S2, no murmurs, rubs or gallops  Abdominal: Soft, NT, ND +BS, Last BM  Extremities: No edema, + peripheral pulses  Incisions:   Tubes:    LABS:                          8.7    9.1   )-----------( 388      ( 05 Jul 2019 20:42 )             27.0     07-05    142  |  105  |  41<H>  ----------------------------<  177<H>  4.3   |  23  |  1.89<H>    Ca    9.6      05 Jul 2019 20:42  Phos  3.8     07-05  Mg     2.1     07-05    TPro  7.9  /  Alb  3.8  /  TBili  0.4  /  DBili  x   /  AST  21  /  ALT  30  /  AlkPhos  154<H>  07-05    LIVER FUNCTIONS - ( 05 Jul 2019 20:42 )  Alb: 3.8 g/dL / Pro: 7.9 g/dL / ALK PHOS: 154 U/L / ALT: 30 U/L / AST: 21 U/L / GGT: x           PT/INR - ( 05 Jul 2019 20:42 )   PT: 12.9 sec;   INR: 1.13 ratio         PTT - ( 05 Jul 2019 20:42 )  PTT:37.1 sec  CKMB Units: 5.5 ng/mL (07-05 @ 20:42)  Creatine Kinase, Serum: 65 U/L (07-05 @ 20:42)    CARDIAC MARKERS ( 05 Jul 2019 20:42 )  x     / x     / 65 U/L / x     / 5.5 ng/mL          TELEMETRY:     EKG:     IMAGING: PATIENT:  SELVIN CLAIRE  [id number]    CHIEF COMPLAINT:  Patient is a 73y old  Female who presents with a chief complaint of Hypoxia, SOB (05 Jul 2019 22:22)    Miss Claire is 74 yo woman with PMHx of HTN, T2DM, CAD s/p CABG (LIMA to LAD, SVG to OM, SVG to PDA 2014 at [facility]), non-dilated ICM (EF 20-25%), severe mitral regurgitation s/p mitral clip (6/13/19 Dr. Newman), severe pulm HTN, CKD, hypothyroidism, who is presenting to ED after experiencing worsening SOB and intermittent chest pain at [facility]. In ED, pt was placed on BiPAP and given lasix 40mg IVPx1.  Transferred to CCU for further management    INTERVAL HISTORYOVERNIGHT EVENTS:  Sinus tachy to 160s this AM. Gave lopressor 5mg. Resolved.    REVIEW OF SYSTEMS:    Constitutional:     [x] negative [ ] fevers [ ] chills [ ] weight loss [ ] weight gain  HEENT:                  [x] negative [ ] dry eyes [ ] eye irritation [ ] postnasal drip [ ] nasal congestion  CV:                         [ ] negative  [ ] chest pain [ ] orthopnea [ ] palpitations [ ] murmur  Resp:                     [] negative [ ] cough [+] shortness of breath [ ] dyspnea [ ] wheezing [ ] sputum [ ] hemoptysis  GI:                          [x] negative [ ] nausea [ ] vomiting [ ] diarrhea [ ] constipation [ ] abd pain [ ] dysphagia   :                        [x] negative [ ] dysuria [ ] nocturia [ ] hematuria [ ] increased urinary frequency  Musculoskeletal: [x] negative [ ] back pain [ ] myalgias [ ] arthralgias [ ] fracture  Skin:                       [x] negative [ ] rash [ ] itch  Neurological:        [x] negative [ ] headache [ ] dizziness [ ] syncope [ ] weakness [ ] numbness    MEDICATIONS:  MEDICATIONS  (STANDING):  aspirin enteric coated 81 milliGRAM(s) Oral daily  atorvastatin 40 milliGRAM(s) Oral at bedtime  chlorhexidine 2% Cloths 1 Application(s) Topical daily  dextrose 5%. 1000 milliLiter(s) (50 mL/Hr) IV Continuous <Continuous>  dextrose 50% Injectable 12.5 Gram(s) IV Push once  dextrose 50% Injectable 25 Gram(s) IV Push once  dextrose 50% Injectable 25 Gram(s) IV Push once  docusate sodium 100 milliGRAM(s) Oral two times a day  furosemide   Injectable 40 milliGRAM(s) IV Push every 12 hours  heparin  Injectable 5000 Unit(s) SubCutaneous every 8 hours  insulin glargine Injectable (LANTUS) 25 Unit(s) SubCutaneous at bedtime  insulin lispro (HumaLOG) corrective regimen sliding scale   SubCutaneous three times a day before meals  insulin lispro (HumaLOG) corrective regimen sliding scale   SubCutaneous at bedtime  insulin lispro Injectable (HumaLOG) 10 Unit(s) SubCutaneous three times a day before meals  levothyroxine 50 MICROGram(s) Oral daily  lidocaine   Patch 1 Patch Transdermal daily  montelukast 10 milliGRAM(s) Oral daily  pantoprazole    Tablet 40 milliGRAM(s) Oral before breakfast  senna 2 Tablet(s) Oral at bedtime  ticagrelor 90 milliGRAM(s) Oral every 12 hours    MEDICATIONS  (PRN):  acetaminophen   Tablet .. 650 milliGRAM(s) Oral every 6 hours PRN Mild Pain (1 - 3), Moderate Pain (4 - 6)  dextrose 40% Gel 15 Gram(s) Oral once PRN Blood Glucose LESS THAN 70 milliGRAM(s)/deciliter  glucagon  Injectable 1 milliGRAM(s) IntraMuscular once PRN Glucose LESS THAN 70 milligrams/deciliter      ALLERGIES:  Allergies    azithromycin (Hives; Pruritus)    Intolerances      OBJECTIVE:  ICU Vital Signs Last 24 Hrs  T(C): 36.8 (06 Jul 2019 00:00), Max: 37.6 (05 Jul 2019 14:06)  T(F): 98.2 (06 Jul 2019 00:00), Max: 99.6 (05 Jul 2019 14:06)  HR: 78 (06 Jul 2019 04:00) (72 - 108)  BP: 90/50 (06 Jul 2019 04:00) (72/46 - 121/75)  BP(mean): 64 (06 Jul 2019 04:00) (51 - 83)  ABP: --  ABP(mean): --  RR: 41 (06 Jul 2019 04:00) (20 - 50)  SpO2: 99% (06 Jul 2019 04:00) (96% - 100%)      Adult Advanced Hemodynamics Last 24 Hrs  CVP(mm Hg): --  CVP(cm H2O): --  CO: --  CI: --  PA: --  PA(mean): --  PCWP: --  SVR: --  SVRI: --  PVR: --  PVRI: --  CAPILLARY BLOOD GLUCOSE        CAPILLARY BLOOD GLUCOSE        I&O's Summary    05 Jul 2019 07:01  -  06 Jul 2019 05:46  --------------------------------------------------------  IN: 220 mL / OUT: 525 mL / NET: -305 mL      Daily Height in cm: 129.54 (05 Jul 2019 19:58)    Daily     PHYSICAL EXAMINATION:  General: WN/WD   HEENT: PERRLA, EOMI, moist mucous membranes  Neurology: A&Ox3, nonfocal, CUADRA x 4  Respiratory: crackles noted in both bases  Abdominal: Soft, NT, ND +BS, Last BM  Extremities: No edema, + peripheral pulses, warm extremities    LABS:                          8.7    9.1   )-----------( 388      ( 05 Jul 2019 20:42 )             27.0     07-05    142  |  105  |  41<H>  ----------------------------<  177<H>  4.3   |  23  |  1.89<H>    Ca    9.6      05 Jul 2019 20:42  Phos  3.8     07-05  Mg     2.1     07-05    TPro  7.9  /  Alb  3.8  /  TBili  0.4  /  DBili  x   /  AST  21  /  ALT  30  /  AlkPhos  154<H>  07-05    LIVER FUNCTIONS - ( 05 Jul 2019 20:42 )  Alb: 3.8 g/dL / Pro: 7.9 g/dL / ALK PHOS: 154 U/L / ALT: 30 U/L / AST: 21 U/L / GGT: x           PT/INR - ( 05 Jul 2019 20:42 )   PT: 12.9 sec;   INR: 1.13 ratio         PTT - ( 05 Jul 2019 20:42 )  PTT:37.1 sec  CKMB Units: 5.5 ng/mL (07-05 @ 20:42)  Creatine Kinase, Serum: 65 U/L (07-05 @ 20:42)    CARDIAC MARKERS ( 05 Jul 2019 20:42 )  x     / x     / 65 U/L / x     / 5.5 ng/mL          TELEMETRY:     EKG:     IMAGING:

## 2019-07-07 LAB
ALBUMIN SERPL ELPH-MCNC: 3.4 G/DL — SIGNIFICANT CHANGE UP (ref 3.3–5)
ALBUMIN SERPL ELPH-MCNC: 3.5 G/DL — SIGNIFICANT CHANGE UP (ref 3.3–5)
ALP SERPL-CCNC: 164 U/L — HIGH (ref 40–120)
ALP SERPL-CCNC: 173 U/L — HIGH (ref 40–120)
ALT FLD-CCNC: 29 U/L — SIGNIFICANT CHANGE UP (ref 10–45)
ALT FLD-CCNC: 30 U/L — SIGNIFICANT CHANGE UP (ref 10–45)
ANION GAP SERPL CALC-SCNC: 15 MMOL/L — SIGNIFICANT CHANGE UP (ref 5–17)
ANION GAP SERPL CALC-SCNC: 17 MMOL/L — SIGNIFICANT CHANGE UP (ref 5–17)
AST SERPL-CCNC: 21 U/L — SIGNIFICANT CHANGE UP (ref 10–40)
AST SERPL-CCNC: 26 U/L — SIGNIFICANT CHANGE UP (ref 10–40)
BASOPHILS # BLD AUTO: 0 K/UL — SIGNIFICANT CHANGE UP (ref 0–0.2)
BASOPHILS NFR BLD AUTO: 0.6 % — SIGNIFICANT CHANGE UP (ref 0–2)
BILIRUB SERPL-MCNC: 0.3 MG/DL — SIGNIFICANT CHANGE UP (ref 0.2–1.2)
BILIRUB SERPL-MCNC: 0.3 MG/DL — SIGNIFICANT CHANGE UP (ref 0.2–1.2)
BUN SERPL-MCNC: 52 MG/DL — HIGH (ref 7–23)
BUN SERPL-MCNC: 53 MG/DL — HIGH (ref 7–23)
CALCIUM SERPL-MCNC: 8.4 MG/DL — SIGNIFICANT CHANGE UP (ref 8.4–10.5)
CALCIUM SERPL-MCNC: 8.5 MG/DL — SIGNIFICANT CHANGE UP (ref 8.4–10.5)
CHLORIDE SERPL-SCNC: 102 MMOL/L — SIGNIFICANT CHANGE UP (ref 96–108)
CHLORIDE SERPL-SCNC: 98 MMOL/L — SIGNIFICANT CHANGE UP (ref 96–108)
CO2 SERPL-SCNC: 21 MMOL/L — LOW (ref 22–31)
CO2 SERPL-SCNC: 22 MMOL/L — SIGNIFICANT CHANGE UP (ref 22–31)
CREAT ?TM UR-MCNC: 39 MG/DL — SIGNIFICANT CHANGE UP
CREAT SERPL-MCNC: 2.17 MG/DL — HIGH (ref 0.5–1.3)
CREAT SERPL-MCNC: 2.29 MG/DL — HIGH (ref 0.5–1.3)
EOSINOPHIL # BLD AUTO: 0.3 K/UL — SIGNIFICANT CHANGE UP (ref 0–0.5)
EOSINOPHIL NFR BLD AUTO: 4.4 % — SIGNIFICANT CHANGE UP (ref 0–6)
GLUCOSE BLDC GLUCOMTR-MCNC: 151 MG/DL — HIGH (ref 70–99)
GLUCOSE BLDC GLUCOMTR-MCNC: 215 MG/DL — HIGH (ref 70–99)
GLUCOSE BLDC GLUCOMTR-MCNC: 242 MG/DL — HIGH (ref 70–99)
GLUCOSE BLDC GLUCOMTR-MCNC: 293 MG/DL — HIGH (ref 70–99)
GLUCOSE SERPL-MCNC: 221 MG/DL — HIGH (ref 70–99)
GLUCOSE SERPL-MCNC: 229 MG/DL — HIGH (ref 70–99)
HCT VFR BLD CALC: 24.4 % — LOW (ref 34.5–45)
HGB BLD-MCNC: 8.1 G/DL — LOW (ref 11.5–15.5)
LACTATE SERPL-SCNC: 1.4 MMOL/L — SIGNIFICANT CHANGE UP (ref 0.7–2)
LACTATE SERPL-SCNC: 1.8 MMOL/L — SIGNIFICANT CHANGE UP (ref 0.7–2)
LYMPHOCYTES # BLD AUTO: 1.8 K/UL — SIGNIFICANT CHANGE UP (ref 1–3.3)
LYMPHOCYTES # BLD AUTO: 23.5 % — SIGNIFICANT CHANGE UP (ref 13–44)
MAGNESIUM SERPL-MCNC: 2.1 MG/DL — SIGNIFICANT CHANGE UP (ref 1.6–2.6)
MAGNESIUM SERPL-MCNC: 2.2 MG/DL — SIGNIFICANT CHANGE UP (ref 1.6–2.6)
MCHC RBC-ENTMCNC: 29.7 PG — SIGNIFICANT CHANGE UP (ref 27–34)
MCHC RBC-ENTMCNC: 33.3 GM/DL — SIGNIFICANT CHANGE UP (ref 32–36)
MCV RBC AUTO: 89.2 FL — SIGNIFICANT CHANGE UP (ref 80–100)
MONOCYTES # BLD AUTO: 0.7 K/UL — SIGNIFICANT CHANGE UP (ref 0–0.9)
MONOCYTES NFR BLD AUTO: 8.6 % — SIGNIFICANT CHANGE UP (ref 2–14)
NEUTROPHILS # BLD AUTO: 4.9 K/UL — SIGNIFICANT CHANGE UP (ref 1.8–7.4)
NEUTROPHILS NFR BLD AUTO: 63 % — SIGNIFICANT CHANGE UP (ref 43–77)
PHOSPHATE SERPL-MCNC: 4.2 MG/DL — SIGNIFICANT CHANGE UP (ref 2.5–4.5)
PHOSPHATE SERPL-MCNC: 4.2 MG/DL — SIGNIFICANT CHANGE UP (ref 2.5–4.5)
PLATELET # BLD AUTO: 329 K/UL — SIGNIFICANT CHANGE UP (ref 150–400)
POTASSIUM SERPL-MCNC: 3.9 MMOL/L — SIGNIFICANT CHANGE UP (ref 3.5–5.3)
POTASSIUM SERPL-MCNC: 4 MMOL/L — SIGNIFICANT CHANGE UP (ref 3.5–5.3)
POTASSIUM SERPL-SCNC: 3.9 MMOL/L — SIGNIFICANT CHANGE UP (ref 3.5–5.3)
POTASSIUM SERPL-SCNC: 4 MMOL/L — SIGNIFICANT CHANGE UP (ref 3.5–5.3)
PROT SERPL-MCNC: 7.3 G/DL — SIGNIFICANT CHANGE UP (ref 6–8.3)
PROT SERPL-MCNC: 7.4 G/DL — SIGNIFICANT CHANGE UP (ref 6–8.3)
RBC # BLD: 2.73 M/UL — LOW (ref 3.8–5.2)
RBC # FLD: 18 % — HIGH (ref 10.3–14.5)
SODIUM SERPL-SCNC: 136 MMOL/L — SIGNIFICANT CHANGE UP (ref 135–145)
SODIUM SERPL-SCNC: 139 MMOL/L — SIGNIFICANT CHANGE UP (ref 135–145)
SODIUM UR-SCNC: 72 MMOL/L — SIGNIFICANT CHANGE UP
UUN UR-MCNC: 342 MG/DL — SIGNIFICANT CHANGE UP
WBC # BLD: 7.7 K/UL — SIGNIFICANT CHANGE UP (ref 3.8–10.5)
WBC # FLD AUTO: 7.7 K/UL — SIGNIFICANT CHANGE UP (ref 3.8–10.5)

## 2019-07-07 PROCEDURE — 99233 SBSQ HOSP IP/OBS HIGH 50: CPT

## 2019-07-07 PROCEDURE — 93010 ELECTROCARDIOGRAM REPORT: CPT | Mod: 76

## 2019-07-07 RX ORDER — METOPROLOL TARTRATE 50 MG
5 TABLET ORAL ONCE
Refills: 0 | Status: COMPLETED | OUTPATIENT
Start: 2019-07-07 | End: 2019-07-08

## 2019-07-07 RX ORDER — FUROSEMIDE 40 MG
7.5 TABLET ORAL
Qty: 500 | Refills: 0 | Status: DISCONTINUED | OUTPATIENT
Start: 2019-07-07 | End: 2019-07-07

## 2019-07-07 RX ORDER — METOPROLOL TARTRATE 50 MG
5 TABLET ORAL ONCE
Refills: 0 | Status: COMPLETED | OUTPATIENT
Start: 2019-07-07 | End: 2019-07-07

## 2019-07-07 RX ORDER — METOPROLOL TARTRATE 50 MG
12.5 TABLET ORAL
Refills: 0 | Status: DISCONTINUED | OUTPATIENT
Start: 2019-07-07 | End: 2019-07-07

## 2019-07-07 RX ORDER — LANOLIN ALCOHOL/MO/W.PET/CERES
3 CREAM (GRAM) TOPICAL AT BEDTIME
Refills: 0 | Status: DISCONTINUED | OUTPATIENT
Start: 2019-07-07 | End: 2019-09-05

## 2019-07-07 RX ORDER — METOPROLOL TARTRATE 50 MG
5 TABLET ORAL ONCE
Refills: 0 | Status: DISCONTINUED | OUTPATIENT
Start: 2019-07-07 | End: 2019-07-07

## 2019-07-07 RX ORDER — POLYETHYLENE GLYCOL 3350 17 G/17G
17 POWDER, FOR SOLUTION ORAL DAILY
Refills: 0 | Status: DISCONTINUED | OUTPATIENT
Start: 2019-07-07 | End: 2019-09-05

## 2019-07-07 RX ORDER — METOPROLOL TARTRATE 50 MG
12.5 TABLET ORAL
Refills: 0 | Status: DISCONTINUED | OUTPATIENT
Start: 2019-07-07 | End: 2019-07-08

## 2019-07-07 RX ORDER — AMIODARONE HYDROCHLORIDE 400 MG/1
150 TABLET ORAL ONCE
Refills: 0 | Status: COMPLETED | OUTPATIENT
Start: 2019-07-07 | End: 2019-07-07

## 2019-07-07 RX ORDER — METOPROLOL TARTRATE 50 MG
2.5 TABLET ORAL ONCE
Refills: 0 | Status: COMPLETED | OUTPATIENT
Start: 2019-07-07 | End: 2019-07-07

## 2019-07-07 RX ADMIN — TICAGRELOR 90 MILLIGRAM(S): 90 TABLET ORAL at 17:01

## 2019-07-07 RX ADMIN — Medication 10 UNIT(S): at 08:52

## 2019-07-07 RX ADMIN — Medication 10 UNIT(S): at 16:58

## 2019-07-07 RX ADMIN — Medication 12.5 MILLIGRAM(S): at 22:28

## 2019-07-07 RX ADMIN — Medication 2.5 MG/HR: at 08:55

## 2019-07-07 RX ADMIN — HEPARIN SODIUM 5000 UNIT(S): 5000 INJECTION INTRAVENOUS; SUBCUTANEOUS at 21:26

## 2019-07-07 RX ADMIN — Medication 2.5 MILLIGRAM(S): at 09:55

## 2019-07-07 RX ADMIN — POLYETHYLENE GLYCOL 3350 17 GRAM(S): 17 POWDER, FOR SOLUTION ORAL at 09:57

## 2019-07-07 RX ADMIN — LIDOCAINE 1 PATCH: 4 CREAM TOPICAL at 07:38

## 2019-07-07 RX ADMIN — LIDOCAINE 1 PATCH: 4 CREAM TOPICAL at 21:25

## 2019-07-07 RX ADMIN — HEPARIN SODIUM 5000 UNIT(S): 5000 INJECTION INTRAVENOUS; SUBCUTANEOUS at 05:36

## 2019-07-07 RX ADMIN — Medication 12.5 MILLIGRAM(S): at 07:01

## 2019-07-07 RX ADMIN — Medication 650 MILLIGRAM(S): at 12:00

## 2019-07-07 RX ADMIN — Medication 650 MILLIGRAM(S): at 19:50

## 2019-07-07 RX ADMIN — Medication 3.75 MG/HR: at 11:15

## 2019-07-07 RX ADMIN — LIDOCAINE 1 PATCH: 4 CREAM TOPICAL at 10:58

## 2019-07-07 RX ADMIN — Medication 3: at 12:12

## 2019-07-07 RX ADMIN — MONTELUKAST 10 MILLIGRAM(S): 4 TABLET, CHEWABLE ORAL at 11:14

## 2019-07-07 RX ADMIN — Medication 5 MILLIGRAM(S): at 22:28

## 2019-07-07 RX ADMIN — Medication 650 MILLIGRAM(S): at 11:23

## 2019-07-07 RX ADMIN — ATORVASTATIN CALCIUM 40 MILLIGRAM(S): 80 TABLET, FILM COATED ORAL at 21:27

## 2019-07-07 RX ADMIN — AMIODARONE HYDROCHLORIDE 600 MILLIGRAM(S): 400 TABLET ORAL at 10:12

## 2019-07-07 RX ADMIN — Medication 100 MILLIGRAM(S): at 05:35

## 2019-07-07 RX ADMIN — Medication 650 MILLIGRAM(S): at 20:10

## 2019-07-07 RX ADMIN — HEPARIN SODIUM 5000 UNIT(S): 5000 INJECTION INTRAVENOUS; SUBCUTANEOUS at 14:36

## 2019-07-07 RX ADMIN — Medication 2: at 16:57

## 2019-07-07 RX ADMIN — CHLORHEXIDINE GLUCONATE 1 APPLICATION(S): 213 SOLUTION TOPICAL at 21:26

## 2019-07-07 RX ADMIN — Medication 10 UNIT(S): at 12:13

## 2019-07-07 RX ADMIN — PANTOPRAZOLE SODIUM 40 MILLIGRAM(S): 20 TABLET, DELAYED RELEASE ORAL at 05:36

## 2019-07-07 RX ADMIN — TICAGRELOR 90 MILLIGRAM(S): 90 TABLET ORAL at 05:35

## 2019-07-07 RX ADMIN — Medication 50 MICROGRAM(S): at 05:36

## 2019-07-07 RX ADMIN — Medication 2: at 08:51

## 2019-07-07 RX ADMIN — INSULIN GLARGINE 25 UNIT(S): 100 INJECTION, SOLUTION SUBCUTANEOUS at 21:27

## 2019-07-07 RX ADMIN — Medication 81 MILLIGRAM(S): at 11:14

## 2019-07-07 NOTE — PROGRESS NOTE ADULT - ATTENDING COMMENTS
chf with mitral clip.  getting diuresed.   not sure why she doesn't have an ICD. will get EP to evaluate for ICD placement

## 2019-07-07 NOTE — PROGRESS NOTE ADULT - SUBJECTIVE AND OBJECTIVE BOX
====================  CCU MIDNIGHT ROUNDS  ====================    SELVIN CLAIRE  [id number]    Patient is a 73y old  Female who presents with a chief complaint of Hypoxia, SOB (07 Jul 2019 18:13)    ====================  SUMMARY:  ====================    ====================  NEW EVENTS:  ====================    MEDICATIONS  (STANDING):  aspirin enteric coated 81 milliGRAM(s) Oral daily  atorvastatin 40 milliGRAM(s) Oral at bedtime  chlorhexidine 2% Cloths 1 Application(s) Topical daily  dextrose 5%. 1000 milliLiter(s) (50 mL/Hr) IV Continuous <Continuous>  dextrose 50% Injectable 12.5 Gram(s) IV Push once  dextrose 50% Injectable 25 Gram(s) IV Push once  dextrose 50% Injectable 25 Gram(s) IV Push once  docusate sodium 100 milliGRAM(s) Oral two times a day  furosemide Infusion 7.5 mG/Hr (3.75 mL/Hr) IV Continuous <Continuous>  heparin  Injectable 5000 Unit(s) SubCutaneous every 8 hours  insulin glargine Injectable (LANTUS) 25 Unit(s) SubCutaneous at bedtime  insulin lispro (HumaLOG) corrective regimen sliding scale   SubCutaneous three times a day before meals  insulin lispro (HumaLOG) corrective regimen sliding scale   SubCutaneous at bedtime  insulin lispro Injectable (HumaLOG) 10 Unit(s) SubCutaneous three times a day before meals  levothyroxine 50 MICROGram(s) Oral daily  lidocaine   Patch 1 Patch Transdermal daily  montelukast 10 milliGRAM(s) Oral daily  pantoprazole    Tablet 40 milliGRAM(s) Oral before breakfast  polyethylene glycol 3350 17 Gram(s) Oral daily  senna 2 Tablet(s) Oral at bedtime  ticagrelor 90 milliGRAM(s) Oral every 12 hours    MEDICATIONS  (PRN):  acetaminophen   Tablet .. 650 milliGRAM(s) Oral every 6 hours PRN Mild Pain (1 - 3), Moderate Pain (4 - 6)  dextrose 40% Gel 15 Gram(s) Oral once PRN Blood Glucose LESS THAN 70 milliGRAM(s)/deciliter  glucagon  Injectable 1 milliGRAM(s) IntraMuscular once PRN Glucose LESS THAN 70 milligrams/deciliter    ====================  VITALS (Last 12 hrs):  ====================  T(C): 36.5 (07-07-19 @ 19:00), Max: 36.6 (07-07-19 @ 17:00)  T(F): 97.7 (07-07-19 @ 19:00), Max: 97.9 (07-07-19 @ 17:00)  HR: 72 (07-07-19 @ 19:00) (58 - 150)  BP: 80/47 (07-07-19 @ 19:00) (72/55 - 98/55)  BP(mean): 67 (07-07-19 @ 19:00) (58 - 77)  RR: 32 (07-07-19 @ 19:00) (16 - 58)  SpO2: 99% (07-07-19 @ 19:00) (98% - 100%)      I&O's Summary    06 Jul 2019 07:01  -  07 Jul 2019 07:00  --------------------------------------------------------  IN: 437.5 mL / OUT: 1810 mL / NET: -1372.5 mL    07 Jul 2019 07:01  -  07 Jul 2019 19:15  --------------------------------------------------------  IN: 344.3 mL / OUT: 510 mL / NET: -165.7 mL        ====================  NEW LABS:  ====================  07-07    136  |  98  |  53<H>  ----------------------------<  221<H>  3.9   |  21<L>  |  2.29<H>    Ca    8.4      07 Jul 2019 15:11  Phos  4.2     07-07  Mg     2.1     07-07    TPro  7.4  /  Alb  3.5  /  TBili  0.3  /  DBili  x   /  AST  26  /  ALT  29  /  AlkPhos  173<H>  07-07    PT/INR - ( 06 Jul 2019 05:24 )   PT: 12.8 sec;   INR: 1.12 ratio      PTT - ( 06 Jul 2019 05:24 )  PTT:35.2 sec    ====================  PLAN:  ====================        Jimena FRIEND NP  Beeper #[id number]  Spectra # [id number] ====================  CCU MIDNIGHT ROUNDS  ====================    SELVIN CLAIRE  [id number]    Patient is a 73y old  Female who presents with a chief complaint of Hypoxia, SOB (07 Jul 2019 18:13)    ====================  SUMMARY: Ms. Claire is 74 yo woman with PMHx of HTN, T2DM, CAD s/p CABG (LIMA to LAD, SVG to OM, SVG to PDA 2014 at [facility]), non-dilated ICM (EF 20-25%), severe mitral regurgitation s/p mitral clip (6/13/19 Dr. Newman), severe cardiogenic pulm HTN, CKD, hypothyroidism, who is presenting to ED after [facility] ED, pt was placed on BiPAP and given lasix 40mg IVPx1.  Transferred to CCU for further management of acutely decompensated HFrEF.  ====================    ====================  NEW EVENTS: Remains on Lasix gtt at 7.5mg/hr u/o ~40-60ml/hr (-280ml), compensated to dry on exam.  ====================    MEDICATIONS  (STANDING):  aspirin enteric coated 81 milliGRAM(s) Oral daily  atorvastatin 40 milliGRAM(s) Oral at bedtime  chlorhexidine 2% Cloths 1 Application(s) Topical daily  dextrose 5%. 1000 milliLiter(s) (50 mL/Hr) IV Continuous <Continuous>  dextrose 50% Injectable 12.5 Gram(s) IV Push once  dextrose 50% Injectable 25 Gram(s) IV Push once  dextrose 50% Injectable 25 Gram(s) IV Push once  docusate sodium 100 milliGRAM(s) Oral two times a day  furosemide Infusion 7.5 mG/Hr (3.75 mL/Hr) IV Continuous <Continuous>  heparin  Injectable 5000 Unit(s) SubCutaneous every 8 hours  insulin glargine Injectable (LANTUS) 25 Unit(s) SubCutaneous at bedtime  insulin lispro (HumaLOG) corrective regimen sliding scale   SubCutaneous three times a day before meals  insulin lispro (HumaLOG) corrective regimen sliding scale   SubCutaneous at bedtime  insulin lispro Injectable (HumaLOG) 10 Unit(s) SubCutaneous three times a day before meals  levothyroxine 50 MICROGram(s) Oral daily  lidocaine   Patch 1 Patch Transdermal daily  montelukast 10 milliGRAM(s) Oral daily  pantoprazole    Tablet 40 milliGRAM(s) Oral before breakfast  polyethylene glycol 3350 17 Gram(s) Oral daily  senna 2 Tablet(s) Oral at bedtime  ticagrelor 90 milliGRAM(s) Oral every 12 hours    MEDICATIONS  (PRN):  acetaminophen   Tablet .. 650 milliGRAM(s) Oral every 6 hours PRN Mild Pain (1 - 3), Moderate Pain (4 - 6)  dextrose 40% Gel 15 Gram(s) Oral once PRN Blood Glucose LESS THAN 70 milliGRAM(s)/deciliter  glucagon  Injectable 1 milliGRAM(s) IntraMuscular once PRN Glucose LESS THAN 70 milligrams/deciliter    ====================  VITALS (Last 12 hrs):  ====================  T(C): 36.5 (07-07-19 @ 19:00), Max: 36.6 (07-07-19 @ 17:00)  T(F): 97.7 (07-07-19 @ 19:00), Max: 97.9 (07-07-19 @ 17:00)  HR: 72 (07-07-19 @ 19:00) (58 - 150)  BP: 80/47 (07-07-19 @ 19:00) (72/55 - 98/55)  BP(mean): 67 (07-07-19 @ 19:00) (58 - 77)  RR: 32 (07-07-19 @ 19:00) (16 - 58)  SpO2: 99% (07-07-19 @ 19:00) (98% - 100%)      I&O's Summary    06 Jul 2019 07:01  -  07 Jul 2019 07:00  --------------------------------------------------------  IN: 437.5 mL / OUT: 1810 mL / NET: -1372.5 mL    07 Jul 2019 07:01  -  07 Jul 2019 19:15  --------------------------------------------------------  IN: 344.3 mL / OUT: 510 mL / NET: -165.7 mL        ====================  NEW LABS:  ====================  07-07    136  |  98  |  53<H>  ----------------------------<  221<H>  3.9   |  21<L>  |  2.29<H>    Ca    8.4      07 Jul 2019 15:11  Phos  4.2     07-07  Mg     2.1     07-07    TPro  7.4  /  Alb  3.5  /  TBili  0.3  /  DBili  x   /  AST  26  /  ALT  29  /  AlkPhos  173<H>  07-07    PT/INR - ( 06 Jul 2019 05:24 )   PT: 12.8 sec;   INR: 1.12 ratio      PTT - ( 06 Jul 2019 05:24 )  PTT:35.2 sec    ====================  PLAN:  ====================  #CV  1. Acute on Chronic Systolic HF; compensated to dry   - D/C Lasix gtt, diuresis prn, strict I/Os, keep net even   - BMP q12h, supplement lytes prn, keep K>4, Mag >2  - Start Metoprolol 12.5mg BID; DC on Toprol XL  - Holding afterload agents for now 2/2 liable BPs  - EP consult in AM for ICD eval     2. CAD s/p CABG (LIMA to LAD, SVG to OM, SVG to PDA 2014    - Cont. DAPT, high intensity statin, LFTs stable     3. SVT; episodes during the day  - Start low dose beta blocker, titrate if BP tolerates         Jimena Ramirez CCU NP  Beeper #[id number]  Spectra # [id number] ====================  CCU MIDNIGHT ROUNDS  ====================    SELVIN CLAIRE  [id number]    Patient is a 73y old  Female who presents with a chief complaint of Hypoxia, SOB (07 Jul 2019 18:13)    ====================  SUMMARY: Ms. Claire is 72 yo woman with PMHx of HTN, T2DM, CAD s/p CABG (LIMA to LAD, SVG to OM, SVG to PDA 2014 at [facility]), non-dilated ICM (EF 20-25%), severe mitral regurgitation s/p mitral clip (6/13/19 Dr. Newman), severe cardiogenic pulm HTN, CKD, hypothyroidism, who is presenting to ED after [facility]. In ED, pt was placed on BiPAP and given lasix 40mg IVPx1.  Transferred to CCU for further management of acutely decompensated HFrEF.  ====================    ====================  NEW EVENTS: Remains on Lasix gtt at 7.5mg/hr u/o ~40-60ml/hr (-280ml), compensated to dry on exam.  Lactate wnl, SCr 2.1 -> 2.29  ====================    MEDICATIONS  (STANDING):  aspirin enteric coated 81 milliGRAM(s) Oral daily  atorvastatin 40 milliGRAM(s) Oral at bedtime  chlorhexidine 2% Cloths 1 Application(s) Topical daily  dextrose 5%. 1000 milliLiter(s) (50 mL/Hr) IV Continuous <Continuous>  dextrose 50% Injectable 12.5 Gram(s) IV Push once  dextrose 50% Injectable 25 Gram(s) IV Push once  dextrose 50% Injectable 25 Gram(s) IV Push once  docusate sodium 100 milliGRAM(s) Oral two times a day  furosemide Infusion 7.5 mG/Hr (3.75 mL/Hr) IV Continuous <Continuous>  heparin  Injectable 5000 Unit(s) SubCutaneous every 8 hours  insulin glargine Injectable (LANTUS) 25 Unit(s) SubCutaneous at bedtime  insulin lispro (HumaLOG) corrective regimen sliding scale   SubCutaneous three times a day before meals  insulin lispro (HumaLOG) corrective regimen sliding scale   SubCutaneous at bedtime  insulin lispro Injectable (HumaLOG) 10 Unit(s) SubCutaneous three times a day before meals  levothyroxine 50 MICROGram(s) Oral daily  lidocaine   Patch 1 Patch Transdermal daily  montelukast 10 milliGRAM(s) Oral daily  pantoprazole    Tablet 40 milliGRAM(s) Oral before breakfast  polyethylene glycol 3350 17 Gram(s) Oral daily  senna 2 Tablet(s) Oral at bedtime  ticagrelor 90 milliGRAM(s) Oral every 12 hours    MEDICATIONS  (PRN):  acetaminophen   Tablet .. 650 milliGRAM(s) Oral every 6 hours PRN Mild Pain (1 - 3), Moderate Pain (4 - 6)  dextrose 40% Gel 15 Gram(s) Oral once PRN Blood Glucose LESS THAN 70 milliGRAM(s)/deciliter  glucagon  Injectable 1 milliGRAM(s) IntraMuscular once PRN Glucose LESS THAN 70 milligrams/deciliter    ====================  VITALS (Last 12 hrs):  ====================  T(C): 36.5 (07-07-19 @ 19:00), Max: 36.6 (07-07-19 @ 17:00)  T(F): 97.7 (07-07-19 @ 19:00), Max: 97.9 (07-07-19 @ 17:00)  HR: 72 (07-07-19 @ 19:00) (58 - 150)  BP: 80/47 (07-07-19 @ 19:00) (72/55 - 98/55)  BP(mean): 67 (07-07-19 @ 19:00) (58 - 77)  RR: 32 (07-07-19 @ 19:00) (16 - 58)  SpO2: 99% (07-07-19 @ 19:00) (98% - 100%)      I&O's Summary    06 Jul 2019 07:01  -  07 Jul 2019 07:00  --------------------------------------------------------  IN: 437.5 mL / OUT: 1810 mL / NET: -1372.5 mL    07 Jul 2019 07:01  -  07 Jul 2019 19:15  --------------------------------------------------------  IN: 344.3 mL / OUT: 510 mL / NET: -165.7 mL        ====================  NEW LABS:  ====================  07-07    136  |  98  |  53<H>  ----------------------------<  221<H>  3.9   |  21<L>  |  2.29<H>    Ca    8.4      07 Jul 2019 15:11  Phos  4.2     07-07  Mg     2.1     07-07    TPro  7.4  /  Alb  3.5  /  TBili  0.3  /  DBili  x   /  AST  26  /  ALT  29  /  AlkPhos  173<H>  07-07    PT/INR - ( 06 Jul 2019 05:24 )   PT: 12.8 sec;   INR: 1.12 ratio      PTT - ( 06 Jul 2019 05:24 )  PTT:35.2 sec    ====================  PLAN:  ====================  #CV  1. Acute on Chronic Systolic HF; compensated to dry   - D/C Lasix gtt, diuresis prn, strict I/Os, keep net even   - BMP q12h, supplement lytes prn, keep K>4, Mag >2  - Start Metoprolol 12.5mg BID; DC on Toprol XL  - Holding afterload agents for now 2/2 liable BPs  - EP consult in AM for ICD eval     2. CAD s/p CABG (LIMA to LAD, SVG to OM, SVG to PDA 2014    - Cont. DAPT, high intensity statin, LFTs stable     3. SVT; episodes during the day  - Start low dose beta blocker, titrate if BP tolerates         Jimena Ramirez CCU NP  Beeper #[id number]  Spectra # [phone] ====================  CCU MIDNIGHT ROUNDS  ====================    SELVIN CLAIRE  [id number]    Patient is a 73y old  Female who presents with a chief complaint of Hypoxia, SOB (07 Jul 2019 18:13)    ====================  SUMMARY: Ms. Claire is 72 yo woman with PMHx of HTN, T2DM, CAD s/p CABG (LIMA to LAD, SVG to OM, SVG to PDA 2014 at [facility]), non-dilated ICM (EF 20-25%), severe mitral regurgitation s/p mitral clip (6/13/19 Dr. Newman), severe cardiogenic pulm HTN, CKD, hypothyroidism, who is presenting to ED [facility], pt was placed on BiPAP and given lasix 40mg IVPx1.  Transferred to CCU for further management of acutely decompensated HFrEF.  ====================    ====================  NEW EVENTS: Remains on Lasix gtt at 7.5mg/hr u/o ~40-60ml/hr (-280ml), decompensated on exam.  Lactate wnl, SCr 2.1 -> 2.29  ====================    MEDICATIONS  (STANDING):  aspirin enteric coated 81 milliGRAM(s) Oral daily  atorvastatin 40 milliGRAM(s) Oral at bedtime  chlorhexidine 2% Cloths 1 Application(s) Topical daily  dextrose 5%. 1000 milliLiter(s) (50 mL/Hr) IV Continuous <Continuous>  dextrose 50% Injectable 12.5 Gram(s) IV Push once  dextrose 50% Injectable 25 Gram(s) IV Push once  dextrose 50% Injectable 25 Gram(s) IV Push once  docusate sodium 100 milliGRAM(s) Oral two times a day  furosemide Infusion 7.5 mG/Hr (3.75 mL/Hr) IV Continuous <Continuous>  heparin  Injectable 5000 Unit(s) SubCutaneous every 8 hours  insulin glargine Injectable (LANTUS) 25 Unit(s) SubCutaneous at bedtime  insulin lispro (HumaLOG) corrective regimen sliding scale   SubCutaneous three times a day before meals  insulin lispro (HumaLOG) corrective regimen sliding scale   SubCutaneous at bedtime  insulin lispro Injectable (HumaLOG) 10 Unit(s) SubCutaneous three times a day before meals  levothyroxine 50 MICROGram(s) Oral daily  lidocaine   Patch 1 Patch Transdermal daily  montelukast 10 milliGRAM(s) Oral daily  pantoprazole    Tablet 40 milliGRAM(s) Oral before breakfast  polyethylene glycol 3350 17 Gram(s) Oral daily  senna 2 Tablet(s) Oral at bedtime  ticagrelor 90 milliGRAM(s) Oral every 12 hours    MEDICATIONS  (PRN):  acetaminophen   Tablet .. 650 milliGRAM(s) Oral every 6 hours PRN Mild Pain (1 - 3), Moderate Pain (4 - 6)  dextrose 40% Gel 15 Gram(s) Oral once PRN Blood Glucose LESS THAN 70 milliGRAM(s)/deciliter  glucagon  Injectable 1 milliGRAM(s) IntraMuscular once PRN Glucose LESS THAN 70 milligrams/deciliter    ====================  VITALS (Last 12 hrs):  ====================  T(C): 36.5 (07-07-19 @ 19:00), Max: 36.6 (07-07-19 @ 17:00)  T(F): 97.7 (07-07-19 @ 19:00), Max: 97.9 (07-07-19 @ 17:00)  HR: 72 (07-07-19 @ 19:00) (58 - 150)  BP: 80/47 (07-07-19 @ 19:00) (72/55 - 98/55)  BP(mean): 67 (07-07-19 @ 19:00) (58 - 77)  RR: 32 (07-07-19 @ 19:00) (16 - 58)  SpO2: 99% (07-07-19 @ 19:00) (98% - 100%)      I&O's Summary    06 Jul 2019 07:01  -  07 Jul 2019 07:00  --------------------------------------------------------  IN: 437.5 mL / OUT: 1810 mL / NET: -1372.5 mL    07 Jul 2019 07:01  -  07 Jul 2019 19:15  --------------------------------------------------------  IN: 344.3 mL / OUT: 510 mL / NET: -165.7 mL        ====================  NEW LABS:  ====================  07-07    136  |  98  |  53<H>  ----------------------------<  221<H>  3.9   |  21<L>  |  2.29<H>    Ca    8.4      07 Jul 2019 15:11  Phos  4.2     07-07  Mg     2.1     07-07    TPro  7.4  /  Alb  3.5  /  TBili  0.3  /  DBili  x   /  AST  26  /  ALT  29  /  AlkPhos  173<H>  07-07    PT/INR - ( 06 Jul 2019 05:24 )   PT: 12.8 sec;   INR: 1.12 ratio      PTT - ( 06 Jul 2019 05:24 )  PTT:35.2 sec    ====================  PLAN:  ====================  #CV  1. Acute on Chronic Systolic HF; decompensated   - Lasix 40mg IVP, c/w Lasix gtt at 7.5mg/hr, strict I/Os, keep -1.5L  - Monitor urine output closely, may require Zaroxolyn prn    - BMP q12h, supplement lytes prn, keep K>4, Mag >2  - Start Metoprolol 12.5mg BID; DC on Toprol XL  - Holding afterload agents for now 2/2 liable BPs  - EP consult in AM for ICD eval     2. CAD s/p CABG (LIMA to LAD, SVG to OM, SVG to PDA 2014    - Cont. DAPT, high intensity statin, LFTs stable     3. SVT; episodes during the day  - Start low dose beta blocker, titrate if BP tolerates         Jimena Ramirez CCU NP  Beeper #[id number]  Spectra # [phone]

## 2019-07-07 NOTE — PROGRESS NOTE ADULT - SUBJECTIVE AND OBJECTIVE BOX
CHIEF COMPLAINT:    SUBJECTIVE:     REVIEW OF SYSTEMS:    CONSTITUTIONAL: (  )  weakness,  (  ) fevers or chills  EYES/ENT: (  )visual changes;     NECK: (  ) pain or stiffness  RESPIRATORY:   (  )cough, wheezing, hemoptysis;  (  ) shortness of breath  CARDIOVASCULAR:  (  )chest pain or palpitations  GASTROINTESTINAL:   (  )abdominal or epigastric pain.  (  ) nausea, vomiting, or hematemesis;   (   ) diarrhea or constipation.   GENITOURINARY:   (    ) dysuria, frequency or hematuria  NEUROLOGICAL:  (   ) numbness or weakness   All other review of systems is negative unless indicated above    Vital Signs Last 24 Hrs  T(C): 36.6 (07 Jul 2019 17:00), Max: 36.6 (07 Jul 2019 17:00)  T(F): 97.9 (07 Jul 2019 17:00), Max: 97.9 (07 Jul 2019 17:00)  HR: 72 (07 Jul 2019 18:00) (58 - 156)  BP: 91/48 (07 Jul 2019 18:00) (72/55 - 118/69)  BP(mean): 61 (07 Jul 2019 18:00) (55 - 94)  RR: 27 (07 Jul 2019 18:00) (16 - 58)  SpO2: 99% (07 Jul 2019 18:00) (97% - 100%)    I&O's Summary    06 Jul 2019 07:01  -  07 Jul 2019 07:00  --------------------------------------------------------  IN: 437.5 mL / OUT: 1810 mL / NET: -1372.5 mL    07 Jul 2019 07:01  -  07 Jul 2019 18:14  --------------------------------------------------------  IN: 282.9 mL / OUT: 410 mL / NET: -127.1 mL        CAPILLARY BLOOD GLUCOSE      POCT Blood Glucose.: 215 mg/dL (07 Jul 2019 16:56)  POCT Blood Glucose.: 293 mg/dL (07 Jul 2019 12:12)  POCT Blood Glucose.: 242 mg/dL (07 Jul 2019 08:50)  POCT Blood Glucose.: 303 mg/dL (06 Jul 2019 21:41)      PHYSICAL EXAM:    Constitutional:  (   ) NAD,   (   )awake and alert  HEENT: PERR, EOMI,    Neck: Soft and supple, No LAD, No JVD  Respiratory:  (    Breath sounds are clear bilaterally,    (   ) wheezing, rales or rhonchi  Cardiovascular:     (   )S1 and S2, regular rate and rhythm, no Murmurs, gallops or rubs  Gastrointestinal:  (   )Bowel Sounds present, soft,   (  )nontender, nondistended,    Extremities:    (  ) peripheral edema  Vascular: 2+ peripheral pulses  Neurological:    (    )A/O x 3,   (  ) focal deficits  Musculoskeletal:    (   )  normal strength b/l upper  (     ) normal  lower extremities  Skin: No rashes    MEDICATIONS:  MEDICATIONS  (STANDING):  aspirin enteric coated 81 milliGRAM(s) Oral daily  atorvastatin 40 milliGRAM(s) Oral at bedtime  chlorhexidine 2% Cloths 1 Application(s) Topical daily  dextrose 5%. 1000 milliLiter(s) (50 mL/Hr) IV Continuous <Continuous>  dextrose 50% Injectable 12.5 Gram(s) IV Push once  dextrose 50% Injectable 25 Gram(s) IV Push once  dextrose 50% Injectable 25 Gram(s) IV Push once  docusate sodium 100 milliGRAM(s) Oral two times a day  furosemide Infusion 7.5 mG/Hr (3.75 mL/Hr) IV Continuous <Continuous>  heparin  Injectable 5000 Unit(s) SubCutaneous every 8 hours  insulin glargine Injectable (LANTUS) 25 Unit(s) SubCutaneous at bedtime  insulin lispro (HumaLOG) corrective regimen sliding scale   SubCutaneous three times a day before meals  insulin lispro (HumaLOG) corrective regimen sliding scale   SubCutaneous at bedtime  insulin lispro Injectable (HumaLOG) 10 Unit(s) SubCutaneous three times a day before meals  levothyroxine 50 MICROGram(s) Oral daily  lidocaine   Patch 1 Patch Transdermal daily  montelukast 10 milliGRAM(s) Oral daily  pantoprazole    Tablet 40 milliGRAM(s) Oral before breakfast  polyethylene glycol 3350 17 Gram(s) Oral daily  senna 2 Tablet(s) Oral at bedtime  ticagrelor 90 milliGRAM(s) Oral every 12 hours      LABS: All Labs Reviewed:                        8.1    7.7   )-----------( 329      ( 07 Jul 2019 01:53 )             24.4     07-07    136  |  98  |  53<H>  ----------------------------<  221<H>  3.9   |  21<L>  |  2.29<H>    Ca    8.4      07 Jul 2019 15:11  Phos  4.2     07-07  Mg     2.1     07-07    TPro  7.4  /  Alb  3.5  /  TBili  0.3  /  DBili  x   /  AST  26  /  ALT  29  /  AlkPhos  173<H>  07-07    PT/INR - ( 06 Jul 2019 05:24 )   PT: 12.8 sec;   INR: 1.12 ratio         PTT - ( 06 Jul 2019 05:24 )  PTT:35.2 sec  CARDIAC MARKERS ( 06 Jul 2019 15:40 )  x     / x     / 46 U/L / x     / 2.8 ng/mL  CARDIAC MARKERS ( 05 Jul 2019 20:42 )  x     / x     / 65 U/L / x     / 5.5 ng/mL      Blood Culture:   Urine Culture      RADIOLOGY/EKG:    ASSESSMENT AND PLAN:    DVT PPX:    ADVANCED DIRECTIVE:    DISPOSITION: CHIEF COMPLAINT: patient seen in bed 2 her daughter and her son at the bedside  Patient is a 73y old  Female who presents with a chief complaint of Hypoxia, SOB (05 Jul 2019 22:22)  patient was seen in CCU her daughter at the bedside condition improved she had one event of sinus tachycardia which responded to Lopressor (she was seen at 1 PM)   74 yo woman with PMHx of HTN, T2DM, CAD s/p CABG (LIMA to LAD, SVG to OM, SVG to PDA 2014 at [facility]), non-dilated ICM (EF 20-25%), severe mitral regurgitation s/p mitral clip (6/13/19 Dr. Newman), severe pulm HTN, CKD, hypothyroidism, who is presenting to ED after experiencing worsening SOB and intermittent chest pain at [facility]. In ED, pt was placed on BiPAP and given lasix 40mg IVPx1.  Transferred to CCU for further management  SUBJECTIVE:     REVIEW OF SYSTEMS:    CONSTITUTIONAL: ( x  )  weakness,  (  ) fevers or chills  EYES/ENT: (  )visual changes;     NECK: (  ) pain or stiffness  RESPIRATORY:   (  )cough, wheezing, hemoptysis;  (x  ) shortness of breath  CARDIOVASCULAR:  (  )chest pain or palpitations  GASTROINTESTINAL:   (  )abdominal or epigastric pain.  (  ) nausea, vomiting, or hematemesis;   (   ) diarrhea or constipation.   GENITOURINARY:   (    ) dysuria, frequency or hematuria  NEUROLOGICAL:  ( x  )  anxiety   All other review of systems is negative unless indicated above    Vital Signs Last 24 Hrs  T(C): 36.6 (07 Jul 2019 17:00), Max: 36.6 (07 Jul 2019 17:00)  T(F): 97.9 (07 Jul 2019 17:00), Max: 97.9 (07 Jul 2019 17:00)  HR: 72 (07 Jul 2019 18:00) (58 - 156)  BP: 91/48 (07 Jul 2019 18:00) (72/55 - 118/69)  BP(mean): 61 (07 Jul 2019 18:00) (55 - 94)  RR: 27 (07 Jul 2019 18:00) (16 - 58)  SpO2: 99% (07 Jul 2019 18:00) (97% - 100%)    I&O's Summary    06 Jul 2019 07:01  -  07 Jul 2019 07:00  --------------------------------------------------------  IN: 437.5 mL / OUT: 1810 mL / NET: -1372.5 mL    07 Jul 2019 07:01  -  07 Jul 2019 18:14  --------------------------------------------------------  IN: 282.9 mL / OUT: 410 mL / NET: -127.1 mL        CAPILLARY BLOOD GLUCOSE      POCT Blood Glucose.: 215 mg/dL (07 Jul 2019 16:56)  POCT Blood Glucose.: 293 mg/dL (07 Jul 2019 12:12)  POCT Blood Glucose.: 242 mg/dL (07 Jul 2019 08:50)  POCT Blood Glucose.: 303 mg/dL (06 Jul 2019 21:41)      PHYSICAL EXAM:    Constitutional:  ( x  ) NAD,   (  x )awake   HEENT: PERR, EOMI,    Neck: Soft and supple, No LAD, No JVD  Respiratory:  (  x  Breath sounds are clear bilaterally,    ( x  )   rales  at the bases  Cardiovascular:     (   x)S1 and S2, regular rate and rhythm, no Murmurs, gallops or rubs  Gastrointestinal:  ( x  )Bowel Sounds present, soft,   ( x )nontender, nondistended,    Extremities:    (  ) peripheral edema  Vascular: 2+ peripheral pulses  Neurological:    (   x )A/O x 3,   ( x ) focal deficits  Musculoskeletal:    (   )  normal strength b/l upper  (     ) normal  lower extremities  Skin: No rashes    MEDICATIONS:  MEDICATIONS  (STANDING):  aspirin enteric coated 81 milliGRAM(s) Oral daily  atorvastatin 40 milliGRAM(s) Oral at bedtime  chlorhexidine 2% Cloths 1 Application(s) Topical daily  dextrose 5%. 1000 milliLiter(s) (50 mL/Hr) IV Continuous <Continuous>  dextrose 50% Injectable 12.5 Gram(s) IV Push once  dextrose 50% Injectable 25 Gram(s) IV Push once  dextrose 50% Injectable 25 Gram(s) IV Push once  docusate sodium 100 milliGRAM(s) Oral two times a day  furosemide Infusion 7.5 mG/Hr (3.75 mL/Hr) IV Continuous <Continuous>  heparin  Injectable 5000 Unit(s) SubCutaneous every 8 hours  insulin glargine Injectable (LANTUS) 25 Unit(s) SubCutaneous at bedtime  insulin lispro (HumaLOG) corrective regimen sliding scale   SubCutaneous three times a day before meals  insulin lispro (HumaLOG) corrective regimen sliding scale   SubCutaneous at bedtime  insulin lispro Injectable (HumaLOG) 10 Unit(s) SubCutaneous three times a day before meals  levothyroxine 50 MICROGram(s) Oral daily  lidocaine   Patch 1 Patch Transdermal daily  montelukast 10 milliGRAM(s) Oral daily  pantoprazole    Tablet 40 milliGRAM(s) Oral before breakfast  polyethylene glycol 3350 17 Gram(s) Oral daily  senna 2 Tablet(s) Oral at bedtime  ticagrelor 90 milliGRAM(s) Oral every 12 hours      LABS: All Labs Reviewed:                        8.1    7.7   )-----------( 329      ( 07 Jul 2019 01:53 )             24.4     07-07    136  |  98  |  53<H>  ----------------------------<  221<H>  3.9   |  21<L>  |  2.29<H>    07-06    141  |  102  |  50<H>  ----------------------------<  150<H>  4.3   |  21<L>  |  2.13<H>    Ca    8.4      07 Jul 2019 15:11  Phos  4.2     07-07  Mg     2.1     07-07    TPro  7.4  /  Alb  3.5  /  TBili  0.3  /  DBili  x   /  AST  26  /  ALT  29  /  AlkPhos  173<H>  07-07    PT/INR - ( 06 Jul 2019 05:24 )   PT: 12.8 sec;   INR: 1.12 ratio         PTT - ( 06 Jul 2019 05:24 )  PTT:35.2 sec  CARDIAC MARKERS ( 06 Jul 2019 15:40 )  x     / x     / 46 U/L / x     / 2.8 ng/mL  CARDIAC MARKERS ( 05 Jul 2019 20:42 )  x     / x     / 65 U/L / x     / 5.5 ng/mL      Blood Culture:   Urine Culture      RADIOLOGY/EKG:    ASSESSMENT AND PLAN:   74 yo woman with PMHx of HTN, T2DM, CAD s/p CABG (LIMA to LAD, SVG to OM, SVG to PDA 2014 at [facility]), non-dilated ICM (EF 20-25%), severe mitral regurgitation s/p mitral clip (6/13/19 Dr. Newman), severe pulm HTN, CKD, hypothyroidism, who is presenting to ED after experiencing worsening SOB and intermittent chest pain at [facility]. In ED, pt was placed on BiPAP and given lasix 40mg IVPx1.  Transferred to CCU for further management. Currently responding to diuresis    #congestive heart failure/pulmonary edema  Acute decompensated heart failure/volume   -TTE 7/5 showed EF 15-20% with global LV systolic dysfunction  - on   furosemide Infusion 7.5 mG/Hr (3.75 mL/Hr) IV Continuou    patient was on bumex 2mg, then 1mg at night  while she was in rehabilitation .   WILL TRY  to change to IV twice a day secondary to mild MERVIN   -to consider lasix TID if UOP insufficient  -Strict I/O, trend weights  -Trending cardiac enzymes. 292 ->330 -> 412     #CAD/sinus tachycardia  -ASA, brilinta, statin  -lopressor 12.5   episode of tachycardia this morning to 160's. Resolved post 5mg lopressor     #pulmonary hypertension     -off bi-pap  -sating well on NC  -continue to trend SpO2     #  END DM, hyperglycemia/hypothyroidism  -Hgb A1C 7.4  -started Lantus 25U at bedtime  -started ISS pre meal and at bedtime  -Started lispro 10U pre meal    -On synthroid  -TSH 2.0  -T4, Serum: 6.8 ug/dL (06.12.19 @ 15:17)     # CKD stage IV IV   -New baseline of 1.8-2.0 as per nephro  -To trend renal function     #anemia  Chronic in nature monitor CBC    DVT PPX:    ADVANCED DIRECTIVE:    DISPOSITION:patient wants to go home not to the rehabilitation discussed with the daughter in detail is not possible due to multiple medical issue

## 2019-07-07 NOTE — PROGRESS NOTE ADULT - SUBJECTIVE AND OBJECTIVE BOX
Dr. Muhammad  Office [phone]  Cell [phone]  [facility]  Cell [phone]      Patient is a 73y old  Female who presents with a chief complaint of Hypoxia, SOB (07 Jul 2019 08:27)      Patient seen and examined at bedside. No chest pain/sob    VITALS:  T(F): 97.7 (07-07-19 @ 12:00), Max: 97.9 (07-06-19 @ 16:55)  HR: 66 (07-07-19 @ 14:05)  BP: 80/42 (07-07-19 @ 14:05)  RR: 18 (07-07-19 @ 14:05)  SpO2: 99% (07-07-19 @ 14:05)  Wt(kg): --    07-06 @ 07:01  -  07-07 @ 07:00  --------------------------------------------------------  IN: 437.5 mL / OUT: 1810 mL / NET: -1372.5 mL    07-07 @ 07:01  -  07-07 @ 15:07  --------------------------------------------------------  IN: 275.3 mL / OUT: 325 mL / NET: -49.7 mL          PHYSICAL EXAM:  Constitutional: NAD  Neck: No JVD  Respiratory: decreased air entry at base  Cardiovascular: S1, S2, RRR  Gastrointestinal: BS+, soft, NT/ND  Extremities: No peripheral edema    Hospital Medications:   MEDICATIONS  (STANDING):  aspirin enteric coated 81 milliGRAM(s) Oral daily  atorvastatin 40 milliGRAM(s) Oral at bedtime  chlorhexidine 2% Cloths 1 Application(s) Topical daily  dextrose 5%. 1000 milliLiter(s) (50 mL/Hr) IV Continuous <Continuous>  dextrose 50% Injectable 12.5 Gram(s) IV Push once  dextrose 50% Injectable 25 Gram(s) IV Push once  dextrose 50% Injectable 25 Gram(s) IV Push once  docusate sodium 100 milliGRAM(s) Oral two times a day  furosemide Infusion 7.5 mG/Hr (3.75 mL/Hr) IV Continuous <Continuous>  heparin  Injectable 5000 Unit(s) SubCutaneous every 8 hours  insulin glargine Injectable (LANTUS) 25 Unit(s) SubCutaneous at bedtime  insulin lispro (HumaLOG) corrective regimen sliding scale   SubCutaneous three times a day before meals  insulin lispro (HumaLOG) corrective regimen sliding scale   SubCutaneous at bedtime  insulin lispro Injectable (HumaLOG) 10 Unit(s) SubCutaneous three times a day before meals  levothyroxine 50 MICROGram(s) Oral daily  lidocaine   Patch 1 Patch Transdermal daily  montelukast 10 milliGRAM(s) Oral daily  pantoprazole    Tablet 40 milliGRAM(s) Oral before breakfast  polyethylene glycol 3350 17 Gram(s) Oral daily  senna 2 Tablet(s) Oral at bedtime  ticagrelor 90 milliGRAM(s) Oral every 12 hours      LABS:  07-07    139  |  102  |  52<H>  ----------------------------<  229<H>  4.0   |  22  |  2.17<H>    Ca    8.5      07 Jul 2019 01:53  Phos  4.2     07-07  Mg     2.2     07-07    TPro  7.3  /  Alb  3.4  /  TBili  0.3  /  DBili      /  AST  21  /  ALT  30  /  AlkPhos  164<H>  07-07    Creatinine Trend: 2.17 <--, 2.13 <--, 1.93 <--, 1.89 <--, 1.72 <--    Albumin, Serum: 3.4 g/dL (07-07 @ 01:53)  Phosphorus Level, Serum: 4.2 mg/dL (07-07 @ 01:53)  Phosphorus Level, Serum: 4.0 mg/dL (07-06 @ 15:40)  Albumin, Serum: 3.5 g/dL (07-06 @ 15:40)                              8.1    7.7   )-----------( 329      ( 07 Jul 2019 01:53 )             24.4     Urine Studies:  Urinalysis - [06-14-19 @ 16:33]      Color Light Yellow / Appearance Clear / SG 1.016 / pH 5.5      Gluc 500 mg/dL / Ketone Negative  / Bili Negative / Urobili Negative       Blood Trace / Protein 30 mg/dL / Leuk Est Small / Nitrite Negative      RBC 10 / WBC 10 / Hyaline 2 / Gran  / Sq Epi  / Non Sq Epi 1 / Bacteria Negative    Urine Creatinine 39      [07-07-19 @ 05:09]  Urine Sodium 72      [07-07-19 @ 00:58]  Urine Urea Nitrogen 342      [07-07-19 @ 04:36]    Iron 42, TIBC 348, %sat 12      [07-05-19 @ 22:32]  Ferritin 130      [07-05-19 @ 22:32]  .4 (Ca --)      [04-25-19 @ 05:45]   --  PTH 43.55 (Ca --)      [09-10-18 @ 07:15]   --  PTH 36.60 (Ca --)      [09-08-18 @ 03:15]   --  Vitamin D (25OH) 25.9      [05-01-19 @ 04:00]  HbA1c 7.4      [07-05-19 @ 22:32]  TSH 2.00      [07-05-19 @ 22:32]  Lipid: chol 148, , HDL 35,       [06-01-19 @ 08:00]        RADIOLOGY & ADDITIONAL STUDIES:

## 2019-07-07 NOTE — PROGRESS NOTE ADULT - ASSESSMENT
Assessment:      Plan  #Neuro      #Cardiovascular  Pump    Coronaries    EP    #Pulmonary      #Renal      #Infectious disease      #Hematology/Oncology      #Gastrointestinal      #Endocrine      #Psychiatric      #FEN      #PPx      Amauri James MD  Internal Medicine Resident  [phone] Assessment:  Ms. Gamino is 72 yo woman with PMHx of HTN, T2DM, CAD s/p CABG (LIMA to LAD, SVG to OM, SVG to PDA 2014 at [facility]), non-dilated ICM (EF 20-25%), severe mitral regurgitation s/p mitral clip (6/13/19 Dr. Newman), severe cardiogenic pulm HTN, CKD, hypothyroidism, who is presenting to ED after [facility] In ED, pt was placed on BiPAP and given lasix 40mg IVPx1.  Transferred to CCU for further management of acutely decompensated HFrEF.    Plan  #Neuro      #Cardiovascular  Pump    Coronaries    EP    #Pulmonary      #Renal      #Infectious disease      #Hematology/Oncology      #Gastrointestinal      #Endocrine      #Psychiatric      #FEN      #PPx      Amauri James MD  Internal Medicine Resident  [phone] Assessment:  Ms. Gamino is 72 yo woman with PMHx of HTN, T2DM, CAD s/p CABG (LIMA to LAD, SVG to OM, SVG to PDA 2014 at [facility]), non-dilated ICM (EF 20-25%), severe mitral regurgitation s/p mitral clip (6/13/19 Dr. Newman), severe cardiogenic pulm HTN, CKD, hypothyroidism, who is presenting to ED after [facility] In ED, pt was placed on BiPAP and given lasix 40mg IVPx1.  Transferred to CCU for further management of acutely decompensated HFrEF.    Plan  #Neuro  Primarily Hungarian speaking, but ahs some command of English, no acute issues. A&Ox3.    #Cardiovascular  Pump  Acute decompensated heart failure/volume   TTE 7/5 showed EF 15-20% with global LV systolic dysfunction, s/p Lasix 40mg IVP x1  -Lasix drip @ 7.5 mg/hr  07/07 UOP: ~100 ml/hr  Strict I/O, trend weights    Coronaries  ASA 81, ticagrelor 90 q12    EP  Has had intermittent episodes   #Pulmonary      #Renal      #Infectious disease      #Hematology/Oncology      #Gastrointestinal      #Endocrine      #Psychiatric      #FEN      #PPx      Amauri James MD  Internal Medicine Resident  [phone] Assessment:  Ms. Gamino is 74 yo woman with PMHx of HTN, T2DM, CAD s/p CABG (LIMA to LAD, SVG to OM, SVG to PDA 2014 at [facility]), non-dilated ICM (EF 20-25%), severe mitral regurgitation s/p mitral clip (6/13/19 Dr. Newman), severe cardiogenic pulm HTN, CKD, hypothyroidism, who is presenting to ED [facility], pt was placed on BiPAP and given lasix 40mg IVPx1.  Transferred to CCU for further management of acutely decompensated HFrEF.    Plan  #Neuro  Primarily Romanian speaking, but ahs some command of English, no acute issues. A&Ox3.    #Cardiovascular  Pump  Acute decompensated heart failure/volume   TTE 7/5 showed EF 15-20% with global LV systolic dysfunction, s/p Lasix 40mg IVP x1  -Lasix drip @ 7.5 mg/hr  07/07 UOP: ~100 ml/hr  Strict I/O, trend weights  Holding beta blocker, ACEi 2/2 low BPs currently undergoing diuresis    Coronaries  ASA 81, ticagrelor 90 q12  Atorvastatin 40    EP  Has had intermittent episodes of SVT likely 2/2 aFib.  Broke initially with carotid massage then requiring beta blocker and amiodarone dose.  Would dose with digoxin if reoccurs.    #Pulmonary  Satting well on NC and RA intermittently, respiratory status will improve as patient undergoes diuresis    #Renal  CKD IV, new baseline Cr 1.8-2.0 per nephrology  -CTM    #Infectious disease  Afebrile, no WBCs, asymptomatic    #Hematology/Oncology  Normocytic anemia, longstanding, baseline Hb between 8-9  Likely anemia in setting of chronic disease  -CTM    #Gastrointestinal  No issues.    #Endocrine  Hypothyroidism  TSH 2.0  c/w home levothyroxine    DM2  A1c 7.4  Lantus 25/lispro 10, increase PRN.    #PPx  Heparin subQ    Amauri James MD  Internal Medicine Resident  [phone] Assessment:  Ms. Gamino is 74 yo woman with PMHx of HTN, T2DM, CAD s/p CABG (LIMA to LAD, SVG to OM, SVG to PDA 2014 at [facility]), non-dilated ICM (EF 20-25%), severe mitral regurgitation s/p mitral clip (6/13/19 Dr. Newman), severe cardiogenic pulm HTN, CKD, hypothyroidism, who is presenting to ED after [facility] In ED, pt was placed on BiPAP and given lasix 40mg IVPx1.  Transferred to CCU for further management of acutely decompensated HFrEF.    Plan  #Neuro  Primarily Albanian speaking, but ahs some command of English, no acute issues. A&Ox3.    #Cardiovascular  Pump  Acute decompensated heart failure/volume   TTE 7/5 showed EF 15-20% with global LV systolic dysfunction, s/p Lasix 40mg IVP x1  -Lasix drip @ 7.5 mg/hr  07/07 UOP: ~100 ml/hr  Strict I/O, trend weights  Holding beta blocker, ACEi 2/2 low BPs currently undergoing diuresis    Coronaries  ASA 81, ticagrelor 90 q12  Atorvastatin 40    EP  EP consult 7/8/2019 for ICD placement eval  Has had intermittent episodes of SVT likely 2/2 aFib.  Broke initially with carotid massage then requiring beta blocker and amiodarone dose.  Would dose with digoxin if reoccurs.    #Pulmonary  Satting well on NC and RA intermittently, respiratory status will improve as patient undergoes diuresis    #Renal  CKD IV, new baseline Cr 1.8-2.0 per nephrology  -CTM    #Infectious disease  Afebrile, no WBCs, asymptomatic    #Hematology/Oncology  Normocytic anemia, longstanding, baseline Hb between 8-9  Likely anemia in setting of chronic disease  -CTM    #Gastrointestinal  No issues.    #Endocrine  Hypothyroidism  TSH 2.0  c/w home levothyroxine    DM2  A1c 7.4  Lantus 25/lispro 10, increase PRN.    #PPx  Heparin subQ    Amauri James MD  Internal Medicine Resident  [phone] Assessment:  Ms. Gamino is 74 yo woman with PMHx of HTN, T2DM, CAD s/p CABG (LIMA to LAD, SVG to OM, SVG to PDA 2014 at [facility]), non-dilated ICM (EF 20-25%), severe mitral regurgitation s/p mitral clip (6/13/19 Dr. Newman), severe cardiogenic pulm HTN, CKD, hypothyroidism, who is presenting to ED [facility] In [facility], pt was placed on BiPAP and given lasix 40mg IVPx1.  Transferred to CCU for further management of acutely decompensated HFrEF.    Plan  #Neuro  Primarily Spanish speaking, but ahs some command of English, no acute issues. A&Ox3.    #Cardiovascular  Pump  Acute decompensated heart failure/volume   TTE 7/5 showed EF 15-20% with global LV systolic dysfunction, s/p Lasix 40mg IVP x1  -Lasix drip @ 7.5 mg/hr  Want net negative 2L  07/07 UOP: ~100 ml/hr  Strict I/O, trend weights  Holding beta blocker, ACEi 2/2 low BPs currently undergoing diuresis    Coronaries  ASA 81, ticagrelor 90 q12  Atorvastatin 40    EP  EP consult 7/8/2019 for ICD placement eval  Has had intermittent episodes of SVT likely 2/2 aFib.  Broke initially with carotid massage then requiring beta blocker and amiodarone dose.  Would dose with digoxin if reoccurs.    #Pulmonary  Satting well on NC and RA intermittently, respiratory status will improve as patient undergoes diuresis    #Renal  CKD IV, new baseline Cr 1.8-2.0 per nephrology  -CTM    #Infectious disease  Afebrile, no WBCs, asymptomatic    #Hematology/Oncology  Normocytic anemia, longstanding, baseline Hb between 8-9  Likely anemia in setting of chronic disease  -CTM    #Gastrointestinal  No issues.    #Endocrine  Hypothyroidism  TSH 2.0  c/w home levothyroxine    DM2  A1c 7.4  Lantus 25/lispro 10, increase PRN.    #PPx  Heparin subQ    Amauri James MD  Internal Medicine Resident  [phone]

## 2019-07-07 NOTE — PROGRESS NOTE ADULT - SUBJECTIVE AND OBJECTIVE BOX
Amauri James MD  Internal Medicine Resident  [phone]    PATIENT:  SELVIN CLAIRE  [id number]    CHIEF COMPLAINT:  Patient is a 73y old  Female who presents with a chief complaint of Hypoxia, SOB (2019 21:00)      INTERVAL HISTORY/OVERNIGHT EVENTS:      REVIEW OF SYSTEMS:    Constitutional:     [ ] negative [ ] fevers [ ] chills [ ] weight loss [ ] weight gain  HEENT:                  [ ] negative [ ] dry eyes [ ] eye irritation [ ] postnasal drip [ ] nasal congestion  CV:                         [ ] negative  [ ] chest pain [ ] orthopnea [ ] palpitations [ ] murmur  Resp:                     [ ] negative [ ] cough [ ] shortness of breath [ ] dyspnea [ ] wheezing [ ] sputum [ ] hemoptysis  GI:                          [ ] negative [ ] nausea [ ] vomiting [ ] diarrhea [ ] constipation [ ] abd pain [ ] dysphagia   :                        [ ] negative [ ] dysuria [ ] nocturia [ ] hematuria [ ] increased urinary frequency  Musculoskeletal: [ ] negative [ ] back pain [ ] myalgias [ ] arthralgias [ ] fracture  Skin:                       [ ] negative [ ] rash [ ] itch  Neurological:        [ ] negative [ ] headache [ ] dizziness [ ] syncope [ ] weakness [ ] numbness  Psychiatric:           [ ] negative [ ] anxiety [ ] depression  Endocrine:            [ ] negative [ ] diabetes [ ] thyroid problem  Heme/Lymph:      [ ] negative [ ] anemia [ ] bleeding problem  Allergic/Immune: [ ] negative [ ] itchy eyes [ ] nasal discharge [ ] hives [ ] angioedema    [ ] All other systems negative  [ ] Unable to assess ROS because ________.    MEDICATIONS:  MEDICATIONS  (STANDING):  aspirin enteric coated 81 milliGRAM(s) Oral daily  atorvastatin 40 milliGRAM(s) Oral at bedtime  chlorhexidine 2% Cloths 1 Application(s) Topical daily  dextrose 5%. 1000 milliLiter(s) (50 mL/Hr) IV Continuous <Continuous>  dextrose 50% Injectable 12.5 Gram(s) IV Push once  dextrose 50% Injectable 25 Gram(s) IV Push once  dextrose 50% Injectable 25 Gram(s) IV Push once  docusate sodium 100 milliGRAM(s) Oral two times a day  furosemide Infusion 5 mG/Hr (2.5 mL/Hr) IV Continuous <Continuous>  heparin  Injectable 5000 Unit(s) SubCutaneous every 8 hours  insulin glargine Injectable (LANTUS) 25 Unit(s) SubCutaneous at bedtime  insulin lispro (HumaLOG) corrective regimen sliding scale   SubCutaneous three times a day before meals  insulin lispro (HumaLOG) corrective regimen sliding scale   SubCutaneous at bedtime  insulin lispro Injectable (HumaLOG) 10 Unit(s) SubCutaneous three times a day before meals  levothyroxine 50 MICROGram(s) Oral daily  lidocaine   Patch 1 Patch Transdermal daily  metoprolol tartrate 12.5 milliGRAM(s) Oral two times a day  montelukast 10 milliGRAM(s) Oral daily  pantoprazole    Tablet 40 milliGRAM(s) Oral before breakfast  senna 2 Tablet(s) Oral at bedtime  ticagrelor 90 milliGRAM(s) Oral every 12 hours    MEDICATIONS  (PRN):  acetaminophen   Tablet .. 650 milliGRAM(s) Oral every 6 hours PRN Mild Pain (1 - 3), Moderate Pain (4 - 6)  dextrose 40% Gel 15 Gram(s) Oral once PRN Blood Glucose LESS THAN 70 milliGRAM(s)/deciliter  glucagon  Injectable 1 milliGRAM(s) IntraMuscular once PRN Glucose LESS THAN 70 milligrams/deciliter      ALLERGIES:  Allergies    azithromycin (Hives; Pruritus)    Intolerances        OBJECTIVE:  ICU Vital Signs Last 24 Hrs  T(C): 36.5 (2019 05:00), Max: 36.6 (2019 16:55)  T(F): 97.7 (2019 05:00), Max: 97.9 (2019 16:55)  HR: 76 (2019 07:00) (68 - 156)  BP: 93/55 (2019 07:00) (79/48 - 118/69)  BP(mean): 66 (2019 07:00) (55 - 94)  ABP: --  ABP(mean): --  RR: 24 (2019 07:00) (20 - 37)  SpO2: 100% (2019 07:00) (94% - 100%)      Adult Advanced Hemodynamics Last 24 Hrs  CVP(mm Hg): --  CVP(cm H2O): --  CO: --  CI: --  PA: --  PA(mean): --  PCWP: --  SVR: --  SVRI: --  PVR: --  PVRI: --  CAPILLARY BLOOD GLUCOSE      POCT Blood Glucose.: 303 mg/dL (2019 21:41)  POCT Blood Glucose.: 199 mg/dL (2019 17:14)  POCT Blood Glucose.: 218 mg/dL (2019 12:09)  POCT Blood Glucose.: 223 mg/dL (2019 08:29)    CAPILLARY BLOOD GLUCOSE      POCT Blood Glucose.: 303 mg/dL (2019 21:41)    I&O's Summary    2019 07:  -  2019 07:00  --------------------------------------------------------  IN: 437.5 mL / OUT: 1810 mL / NET: -1372.5 mL    2019 07:01  -  2019 08:27  --------------------------------------------------------  IN: 2.5 mL / OUT: 100 mL / NET: -97.5 mL      Daily     Daily Weight in k.4 (2019 05:00)    PHYSICAL EXAMINATION:  General: WN/WD NAD  HEENT: PERRLA, EOMI, moist mucous membranes  Neurology: A&Ox3, nonfocal, CUADRA x 4  Respiratory: CTA B/L, normal respiratory effort, no wheezes, crackles, rales  CV: RRR, S1S2, no murmurs, rubs or gallops  Abdominal: Soft, NT, ND +BS, Last BM  Extremities: No edema, + peripheral pulses  Incisions:   Tubes:    LABS:                          8.1    7.7   )-----------( 329      ( 2019 01:53 )             24.4         139  |  102  |  52<H>  ----------------------------<  229<H>  4.0   |  22  |  2.17<H>    Ca    8.5      2019 01:53  Phos  4.2       Mg     2.2         TPro  7.3  /  Alb  3.4  /  TBili  0.3  /  DBili  x   /  AST  21  /  ALT  30  /  AlkPhos  164<H>      LIVER FUNCTIONS - ( 2019 01:53 )  Alb: 3.4 g/dL / Pro: 7.3 g/dL / ALK PHOS: 164 U/L / ALT: 30 U/L / AST: 21 U/L / GGT: x           PT/INR - ( 2019 05:24 )   PT: 12.8 sec;   INR: 1.12 ratio         PTT - ( 2019 05:24 )  PTT:35.2 sec  CKMB Units: 2.8 ng/mL ( @ 15:40)  Creatine Kinase, Serum: 46 U/L ( @ 15:40)    CARDIAC MARKERS ( 2019 15:40 )  x     / x     / 46 U/L / x     / 2.8 ng/mL  CARDIAC MARKERS ( 2019 20:42 )  x     / x     / 65 U/L / x     / 5.5 ng/mL          TELEMETRY:     EKG:     IMAGING: Amauri James MD  Internal Medicine Resident  [phone]    PATIENT:  SELVIN GAMINO  [id number]    Ms. Gamino is 74 yo woman with PMHx of HTN, T2DM, CAD s/p CABG (LIMA to LAD, SVG to OM, SVG to PDA  at [facility]), non-dilated ICM (EF 20-25%), severe mitral regurgitation s/p mitral clip (19 Dr. Newman), severe cardiogenic pulm HTN, CKD, hypothyroidism, who is presenting to ED after experiencing worsening SOB and intermittent chest pain at [facility]. In ED, pt was placed on BiPAP and given lasix 40mg IVPx1.  Transferred to CCU for further management of acutely decompensated HFrEF    INTERVAL HISTORY/OVERNIGHT EVENTS:  Overnight did well, was transitioned to Lasix drip yesterday, good UOP.    REVIEW OF SYSTEMS:    Constitutional:     [ ] negative [ ] fevers [ ] chills [ ] weight loss [ ] weight gain  HEENT:                  [ ] negative [ ] dry eyes [ ] eye irritation [ ] postnasal drip [ ] nasal congestion  CV:                         [ ] negative  [ ] chest pain [ ] orthopnea [ ] palpitations [ ] murmur  Resp:                     [ ] negative [ ] cough [ ] shortness of breath [ ] dyspnea [ ] wheezing [ ] sputum [ ] hemoptysis  GI:                          [ ] negative [ ] nausea [ ] vomiting [ ] diarrhea [ ] constipation [ ] abd pain [ ] dysphagia   :                        [ ] negative [ ] dysuria [ ] nocturia [ ] hematuria [ ] increased urinary frequency  Musculoskeletal: [ ] negative [ ] back pain [ ] myalgias [ ] arthralgias [ ] fracture  Skin:                       [ ] negative [ ] rash [ ] itch  Neurological:        [ ] negative [ ] headache [ ] dizziness [ ] syncope [ ] weakness [ ] numbness  Psychiatric:           [ ] negative [ ] anxiety [ ] depression  Endocrine:            [ ] negative [ ] diabetes [ ] thyroid problem  Heme/Lymph:      [ ] negative [ ] anemia [ ] bleeding problem  Allergic/Immune: [ ] negative [ ] itchy eyes [ ] nasal discharge [ ] hives [ ] angioedema    [ ] All other systems negative  [ ] Unable to assess ROS because ________.    MEDICATIONS:  MEDICATIONS  (STANDING):  aspirin enteric coated 81 milliGRAM(s) Oral daily  atorvastatin 40 milliGRAM(s) Oral at bedtime  chlorhexidine 2% Cloths 1 Application(s) Topical daily  dextrose 5%. 1000 milliLiter(s) (50 mL/Hr) IV Continuous <Continuous>  dextrose 50% Injectable 12.5 Gram(s) IV Push once  dextrose 50% Injectable 25 Gram(s) IV Push once  dextrose 50% Injectable 25 Gram(s) IV Push once  docusate sodium 100 milliGRAM(s) Oral two times a day  furosemide Infusion 5 mG/Hr (2.5 mL/Hr) IV Continuous <Continuous>  heparin  Injectable 5000 Unit(s) SubCutaneous every 8 hours  insulin glargine Injectable (LANTUS) 25 Unit(s) SubCutaneous at bedtime  insulin lispro (HumaLOG) corrective regimen sliding scale   SubCutaneous three times a day before meals  insulin lispro (HumaLOG) corrective regimen sliding scale   SubCutaneous at bedtime  insulin lispro Injectable (HumaLOG) 10 Unit(s) SubCutaneous three times a day before meals  levothyroxine 50 MICROGram(s) Oral daily  lidocaine   Patch 1 Patch Transdermal daily  metoprolol tartrate 12.5 milliGRAM(s) Oral two times a day  montelukast 10 milliGRAM(s) Oral daily  pantoprazole    Tablet 40 milliGRAM(s) Oral before breakfast  senna 2 Tablet(s) Oral at bedtime  ticagrelor 90 milliGRAM(s) Oral every 12 hours    MEDICATIONS  (PRN):  acetaminophen   Tablet .. 650 milliGRAM(s) Oral every 6 hours PRN Mild Pain (1 - 3), Moderate Pain (4 - 6)  dextrose 40% Gel 15 Gram(s) Oral once PRN Blood Glucose LESS THAN 70 milliGRAM(s)/deciliter  glucagon  Injectable 1 milliGRAM(s) IntraMuscular once PRN Glucose LESS THAN 70 milligrams/deciliter      ALLERGIES:  Allergies    azithromycin (Hives; Pruritus)    Intolerances        OBJECTIVE:  ICU Vital Signs Last 24 Hrs  T(C): 36.5 (2019 05:00), Max: 36.6 (2019 16:55)  T(F): 97.7 (2019 05:00), Max: 97.9 (2019 16:55)  HR: 76 (2019 07:00) (68 - 156)  BP: 93/55 (2019 07:00) (79/48 - 118/69)  BP(mean): 66 (2019 07:00) (55 - 94)  ABP: --  ABP(mean): --  RR: 24 (2019 07:00) (20 - 37)  SpO2: 100% (2019 07:00) (94% - 100%)      Adult Advanced Hemodynamics Last 24 Hrs  CVP(mm Hg): --  CVP(cm H2O): --  CO: --  CI: --  PA: --  PA(mean): --  PCWP: --  SVR: --  SVRI: --  PVR: --  PVRI: --  CAPILLARY BLOOD GLUCOSE      POCT Blood Glucose.: 303 mg/dL (2019 21:41)  POCT Blood Glucose.: 199 mg/dL (2019 17:14)  POCT Blood Glucose.: 218 mg/dL (2019 12:09)  POCT Blood Glucose.: 223 mg/dL (2019 08:29)    CAPILLARY BLOOD GLUCOSE      POCT Blood Glucose.: 303 mg/dL (2019 21:41)    I&O's Summary    2019 07:01  -  2019 07:00  --------------------------------------------------------  IN: 437.5 mL / OUT: 1810 mL / NET: -1372.5 mL    2019 07:01  -  2019 08:27  --------------------------------------------------------  IN: 2.5 mL / OUT: 100 mL / NET: -97.5 mL      Daily     Daily Weight in k.4 (2019 05:00)    PHYSICAL EXAMINATION:  General: WN/WD NAD  HEENT: PERRLA, EOMI, moist mucous membranes  Neurology: A&Ox3, nonfocal, CUADRA x 4  Respiratory: CTA B/L, normal respiratory effort, no wheezes, crackles, rales  CV: RRR, S1S2, no murmurs, rubs or gallops  Abdominal: Soft, NT, ND +BS, Last BM  Extremities: No edema, + peripheral pulses  Incisions:   Tubes:    LABS:                          8.1    7.7   )-----------( 329      ( 2019 01:53 )             24.4         139  |  102  |  52<H>  ----------------------------<  229<H>  4.0   |  22  |  2.17<H>    Ca    8.5      2019 01:53  Phos  4.2       Mg     2.2         TPro  7.3  /  Alb  3.4  /  TBili  0.3  /  DBili  x   /  AST  21  /  ALT  30  /  AlkPhos  164<H>      LIVER FUNCTIONS - ( 2019 01:53 )  Alb: 3.4 g/dL / Pro: 7.3 g/dL / ALK PHOS: 164 U/L / ALT: 30 U/L / AST: 21 U/L / GGT: x           PT/INR - ( 2019 05:24 )   PT: 12.8 sec;   INR: 1.12 ratio         PTT - ( 2019 05:24 )  PTT:35.2 sec  CKMB Units: 2.8 ng/mL ( @ 15:40)  Creatine Kinase, Serum: 46 U/L ( @ 15:40)    CARDIAC MARKERS ( 2019 15:40 )  x     / x     / 46 U/L / x     / 2.8 ng/mL  CARDIAC MARKERS ( 2019 20:42 )  x     / x     / 65 U/L / x     / 5.5 ng/mL          TELEMETRY:     EKG:     IMAGING: Amauri James MD  Internal Medicine Resident  [phone]    PATIENT:  SELVIN GAMINO  [id number]    Ms. Gamino is 74 yo woman with PMHx of HTN, T2DM, CAD s/p CABG (LIMA to LAD, SVG to OM, SVG to PDA  at [facility]), non-dilated ICM (EF 20-25%), severe mitral regurgitation s/p mitral clip (19 Dr. Newman), severe cardiogenic pulm HTN, CKD, hypothyroidism, who is presenting to ED after experiencing worsening SOB and intermittent chest pain at [facility]. In ED, pt was placed on BiPAP and given lasix 40mg IVPx1.  Transferred to CCU for further management of acutely decompensated HFrEF    INTERVAL HISTORY/OVERNIGHT EVENTS:  Overnight did well, was transitioned to Lasix drip yesterday, good UOP. Episode of SVT this AM that did not break with carotid massage, beta blocker given, amio given, broke after ~ 30 minutes.    REVIEW OF SYSTEMS:  Constitutional:     [x] negative [ ] fevers [ ] chills [ ] weight loss [ ] weight gain  HEENT:                  [x] negative [ ] dry eyes [ ] eye irritation [ ] postnasal drip [ ] nasal congestion  CV:                         [ ] negative  [ ] chest pain [ ] orthopnea [ ] palpitations [ ] murmur  Resp:                     [] negative [ ] cough [+] shortness of breath [ ] dyspnea [ ] wheezing [ ] sputum [ ] hemoptysis  GI:                          [x] negative [ ] nausea [ ] vomiting [ ] diarrhea [ ] constipation [ ] abd pain [ ] dysphagia   :                        [x] negative [ ] dysuria [ ] nocturia [ ] hematuria [ ] increased urinary frequency  Musculoskeletal: [x] negative [ ] back pain [ ] myalgias [ ] arthralgias [ ] fracture  Skin:                       [x] negative [ ] rash [ ] itch  Neurological:        [x] negative [ ] headache [ ] dizziness [ ] syncope [ ] weakness [ ] numbness    MEDICATIONS:  MEDICATIONS  (STANDING):  aspirin enteric coated 81 milliGRAM(s) Oral daily  atorvastatin 40 milliGRAM(s) Oral at bedtime  chlorhexidine 2% Cloths 1 Application(s) Topical daily  dextrose 5%. 1000 milliLiter(s) (50 mL/Hr) IV Continuous <Continuous>  dextrose 50% Injectable 12.5 Gram(s) IV Push once  dextrose 50% Injectable 25 Gram(s) IV Push once  dextrose 50% Injectable 25 Gram(s) IV Push once  docusate sodium 100 milliGRAM(s) Oral two times a day  furosemide Infusion 5 mG/Hr (2.5 mL/Hr) IV Continuous <Continuous>  heparin  Injectable 5000 Unit(s) SubCutaneous every 8 hours  insulin glargine Injectable (LANTUS) 25 Unit(s) SubCutaneous at bedtime  insulin lispro (HumaLOG) corrective regimen sliding scale   SubCutaneous three times a day before meals  insulin lispro (HumaLOG) corrective regimen sliding scale   SubCutaneous at bedtime  insulin lispro Injectable (HumaLOG) 10 Unit(s) SubCutaneous three times a day before meals  levothyroxine 50 MICROGram(s) Oral daily  lidocaine   Patch 1 Patch Transdermal daily  metoprolol tartrate 12.5 milliGRAM(s) Oral two times a day  montelukast 10 milliGRAM(s) Oral daily  pantoprazole    Tablet 40 milliGRAM(s) Oral before breakfast  senna 2 Tablet(s) Oral at bedtime  ticagrelor 90 milliGRAM(s) Oral every 12 hours    MEDICATIONS  (PRN):  acetaminophen   Tablet .. 650 milliGRAM(s) Oral every 6 hours PRN Mild Pain (1 - 3), Moderate Pain (4 - 6)  dextrose 40% Gel 15 Gram(s) Oral once PRN Blood Glucose LESS THAN 70 milliGRAM(s)/deciliter  glucagon  Injectable 1 milliGRAM(s) IntraMuscular once PRN Glucose LESS THAN 70 milligrams/deciliter    ALLERGIES:  Allergies  azithromycin (Hives; Pruritus)    OBJECTIVE:  ICU Vital Signs Last 24 Hrs  T(C): 36.5 (2019 05:00), Max: 36.6 (2019 16:55)  T(F): 97.7 (2019 05:00), Max: 97.9 (2019 16:55)  HR: 76 (2019 07:00) (68 - 156)  BP: 93/55 (2019 07:00) (79/48 - 118/69)  BP(mean): 66 (2019 07:00) (55 - 94)  RR: 24 (2019 07:00) (20 - 37)  SpO2: 100% (2019 07:00) (94% - 100%)    CAPILLARY BLOOD GLUCOSE  POCT Blood Glucose.: 303 mg/dL (2019 21:41)  POCT Blood Glucose.: 199 mg/dL (2019 17:14)  POCT Blood Glucose.: 218 mg/dL (2019 12:09)  POCT Blood Glucose.: 223 mg/dL (2019 08:29)  POCT Blood Glucose.: 303 mg/dL (2019 21:41)    I&O's Summary  2019 07:  -  2019 07:00  --------------------------------------------------------  IN: 437.5 mL / OUT: 1810 mL / NET: -1372.5 mL    2019 07:01  -  2019 08:27  --------------------------------------------------------  IN: 2.5 mL / OUT: 100 mL / NET: -97.5 mL    Daily     Daily Weight in k.4 (2019 05:00)    PHYSICAL EXAMINATION:  General: WN/WD   HEENT: PERRLA, EOMI, moist mucous membranes  Neurology: A&Ox3, nonfocal, CUADRA x 4  Respiratory: crackles noted in both bases  Abdominal: Soft, NT, ND +BS, Last BM  Extremities: No edema, +peripheral pulses, warm extremities    LABS:                          8.1    7.7   )-----------( 329      ( 2019 01:53 )             24.4         139  |  102  |  52<H>  ----------------------------<  229<H>  4.0   |  22  |  2.17<H>    Ca    8.5      2019 01:53  Phos  4.2     07-07  Mg     2.2     07-07    TPro  7.3  /  Alb  3.4  /  TBili  0.3  /  DBili  x   /  AST  21  /  ALT  30  /  AlkPhos  164<H>      LIVER FUNCTIONS - ( 2019 01:53 )  Alb: 3.4 g/dL / Pro: 7.3 g/dL / ALK PHOS: 164 U/L / ALT: 30 U/L / AST: 21 U/L / GGT: x           PT/INR - ( 2019 05:24 )   PT: 12.8 sec;   INR: 1.12 ratio         PTT - ( 2019 05:24 )  PTT:35.2 sec  CKMB Units: 2.8 ng/mL ( @ 15:40)  Creatine Kinase, Serum: 46 U/L ( @ 15:40)    CARDIAC MARKERS ( 2019 15:40 )  x     / x     / 46 U/L / x     / 2.8 ng/mL  CARDIAC MARKERS ( 2019 20:42 )  x     / x     / 65 U/L / x     / 5.5 ng/mL

## 2019-07-07 NOTE — PROGRESS NOTE ADULT - ASSESSMENT
Pt is a 74 yo woman with PMHx of HTN, T2DM, CAD s/p CABG (LIMA to LAD, SVG to OM, SVG to PDA 2014 at [facility]), non-dilated ICM (EF 20-25%), severe mitral regurgitation s/p mitral clip (6/13/19 Dr. Newman), severe pulm HTN, CKD, hypothyroidism, who is presenting to ED after experiencing worsening SOB      A/P:  CKD stage 4:  Her new baseline Scr 1.8-2.0  Renal function fluctuates sec to CHF  Monitor renal function at present    SOB:  Feels better  Continue Diuresis per cardiology

## 2019-07-08 DIAGNOSIS — Z71.89 OTHER SPECIFIED COUNSELING: ICD-10-CM

## 2019-07-08 LAB
ALBUMIN SERPL ELPH-MCNC: 3.4 G/DL — SIGNIFICANT CHANGE UP (ref 3.3–5)
ALBUMIN SERPL ELPH-MCNC: 3.5 G/DL — SIGNIFICANT CHANGE UP (ref 3.3–5)
ALP SERPL-CCNC: 166 U/L — HIGH (ref 40–120)
ALP SERPL-CCNC: 166 U/L — HIGH (ref 40–120)
ALT FLD-CCNC: 29 U/L — SIGNIFICANT CHANGE UP (ref 10–45)
ALT FLD-CCNC: 35 U/L — SIGNIFICANT CHANGE UP (ref 10–45)
ANION GAP SERPL CALC-SCNC: 17 MMOL/L — SIGNIFICANT CHANGE UP (ref 5–17)
ANION GAP SERPL CALC-SCNC: 18 MMOL/L — HIGH (ref 5–17)
AST SERPL-CCNC: 26 U/L — SIGNIFICANT CHANGE UP (ref 10–40)
AST SERPL-CCNC: 37 U/L — SIGNIFICANT CHANGE UP (ref 10–40)
BASOPHILS # BLD AUTO: 0 K/UL — SIGNIFICANT CHANGE UP (ref 0–0.2)
BASOPHILS NFR BLD AUTO: 0.1 % — SIGNIFICANT CHANGE UP (ref 0–2)
BILIRUB SERPL-MCNC: 0.3 MG/DL — SIGNIFICANT CHANGE UP (ref 0.2–1.2)
BILIRUB SERPL-MCNC: 0.3 MG/DL — SIGNIFICANT CHANGE UP (ref 0.2–1.2)
BUN SERPL-MCNC: 53 MG/DL — HIGH (ref 7–23)
BUN SERPL-MCNC: 55 MG/DL — HIGH (ref 7–23)
CALCIUM SERPL-MCNC: 8.3 MG/DL — LOW (ref 8.4–10.5)
CALCIUM SERPL-MCNC: 8.8 MG/DL — SIGNIFICANT CHANGE UP (ref 8.4–10.5)
CHLORIDE SERPL-SCNC: 99 MMOL/L — SIGNIFICANT CHANGE UP (ref 96–108)
CHLORIDE SERPL-SCNC: 99 MMOL/L — SIGNIFICANT CHANGE UP (ref 96–108)
CK MB BLD-MCNC: 5.9 % — HIGH (ref 0–3.5)
CK MB CFR SERPL CALC: 1.9 NG/ML — SIGNIFICANT CHANGE UP (ref 0–3.8)
CK SERPL-CCNC: 32 U/L — SIGNIFICANT CHANGE UP (ref 25–170)
CO2 SERPL-SCNC: 18 MMOL/L — LOW (ref 22–31)
CO2 SERPL-SCNC: 21 MMOL/L — LOW (ref 22–31)
CREAT SERPL-MCNC: 2.33 MG/DL — HIGH (ref 0.5–1.3)
CREAT SERPL-MCNC: 2.35 MG/DL — HIGH (ref 0.5–1.3)
EOSINOPHIL # BLD AUTO: 0.2 K/UL — SIGNIFICANT CHANGE UP (ref 0–0.5)
EOSINOPHIL NFR BLD AUTO: 1.6 % — SIGNIFICANT CHANGE UP (ref 0–6)
GLUCOSE BLDC GLUCOMTR-MCNC: 112 MG/DL — HIGH (ref 70–99)
GLUCOSE BLDC GLUCOMTR-MCNC: 167 MG/DL — HIGH (ref 70–99)
GLUCOSE BLDC GLUCOMTR-MCNC: 185 MG/DL — HIGH (ref 70–99)
GLUCOSE BLDC GLUCOMTR-MCNC: 204 MG/DL — HIGH (ref 70–99)
GLUCOSE BLDC GLUCOMTR-MCNC: 273 MG/DL — HIGH (ref 70–99)
GLUCOSE BLDC GLUCOMTR-MCNC: 277 MG/DL — HIGH (ref 70–99)
GLUCOSE BLDC GLUCOMTR-MCNC: 289 MG/DL — HIGH (ref 70–99)
GLUCOSE BLDC GLUCOMTR-MCNC: 300 MG/DL — HIGH (ref 70–99)
GLUCOSE SERPL-MCNC: 153 MG/DL — HIGH (ref 70–99)
GLUCOSE SERPL-MCNC: 249 MG/DL — HIGH (ref 70–99)
HCT VFR BLD CALC: 26.4 % — LOW (ref 34.5–45)
HGB BLD-MCNC: 8.6 G/DL — LOW (ref 11.5–15.5)
LACTATE SERPL-SCNC: 1.2 MMOL/L — SIGNIFICANT CHANGE UP (ref 0.7–2)
LYMPHOCYTES # BLD AUTO: 1.8 K/UL — SIGNIFICANT CHANGE UP (ref 1–3.3)
LYMPHOCYTES # BLD AUTO: 15.4 % — SIGNIFICANT CHANGE UP (ref 13–44)
MAGNESIUM SERPL-MCNC: 2.1 MG/DL — SIGNIFICANT CHANGE UP (ref 1.6–2.6)
MAGNESIUM SERPL-MCNC: 2.2 MG/DL — SIGNIFICANT CHANGE UP (ref 1.6–2.6)
MCHC RBC-ENTMCNC: 28.9 PG — SIGNIFICANT CHANGE UP (ref 27–34)
MCHC RBC-ENTMCNC: 32.6 GM/DL — SIGNIFICANT CHANGE UP (ref 32–36)
MCV RBC AUTO: 88.8 FL — SIGNIFICANT CHANGE UP (ref 80–100)
MONOCYTES # BLD AUTO: 1 K/UL — HIGH (ref 0–0.9)
MONOCYTES NFR BLD AUTO: 8.6 % — SIGNIFICANT CHANGE UP (ref 2–14)
NEUTROPHILS # BLD AUTO: 8.7 K/UL — HIGH (ref 1.8–7.4)
NEUTROPHILS NFR BLD AUTO: 74.2 % — SIGNIFICANT CHANGE UP (ref 43–77)
PHOSPHATE SERPL-MCNC: 4.5 MG/DL — SIGNIFICANT CHANGE UP (ref 2.5–4.5)
PHOSPHATE SERPL-MCNC: 5.3 MG/DL — HIGH (ref 2.5–4.5)
PLATELET # BLD AUTO: 372 K/UL — SIGNIFICANT CHANGE UP (ref 150–400)
POTASSIUM SERPL-MCNC: 3.8 MMOL/L — SIGNIFICANT CHANGE UP (ref 3.5–5.3)
POTASSIUM SERPL-MCNC: 4.3 MMOL/L — SIGNIFICANT CHANGE UP (ref 3.5–5.3)
POTASSIUM SERPL-SCNC: 3.8 MMOL/L — SIGNIFICANT CHANGE UP (ref 3.5–5.3)
POTASSIUM SERPL-SCNC: 4.3 MMOL/L — SIGNIFICANT CHANGE UP (ref 3.5–5.3)
PROT SERPL-MCNC: 7.2 G/DL — SIGNIFICANT CHANGE UP (ref 6–8.3)
PROT SERPL-MCNC: 7.4 G/DL — SIGNIFICANT CHANGE UP (ref 6–8.3)
RBC # BLD: 2.97 M/UL — LOW (ref 3.8–5.2)
RBC # FLD: 17.9 % — HIGH (ref 10.3–14.5)
SODIUM SERPL-SCNC: 135 MMOL/L — SIGNIFICANT CHANGE UP (ref 135–145)
SODIUM SERPL-SCNC: 137 MMOL/L — SIGNIFICANT CHANGE UP (ref 135–145)
TROPONIN T, HIGH SENSITIVITY RESULT: 214 NG/L — HIGH (ref 0–51)
WBC # BLD: 11.7 K/UL — HIGH (ref 3.8–10.5)
WBC # FLD AUTO: 11.7 K/UL — HIGH (ref 3.8–10.5)

## 2019-07-08 PROCEDURE — 93010 ELECTROCARDIOGRAM REPORT: CPT

## 2019-07-08 PROCEDURE — 71045 X-RAY EXAM CHEST 1 VIEW: CPT | Mod: 26

## 2019-07-08 PROCEDURE — 99233 SBSQ HOSP IP/OBS HIGH 50: CPT | Mod: GC

## 2019-07-08 RX ORDER — ACETAMINOPHEN 500 MG
1000 TABLET ORAL ONCE
Refills: 0 | Status: COMPLETED | OUTPATIENT
Start: 2019-07-08 | End: 2019-07-08

## 2019-07-08 RX ORDER — VASOPRESSIN 20 [USP'U]/ML
0.08 INJECTION INTRAVENOUS
Qty: 50 | Refills: 0 | Status: DISCONTINUED | OUTPATIENT
Start: 2019-07-08 | End: 2019-07-09

## 2019-07-08 RX ORDER — ALPRAZOLAM 0.25 MG
0.25 TABLET ORAL ONCE
Refills: 0 | Status: DISCONTINUED | OUTPATIENT
Start: 2019-07-08 | End: 2019-07-08

## 2019-07-08 RX ORDER — AMIODARONE HYDROCHLORIDE 400 MG/1
150 TABLET ORAL ONCE
Refills: 0 | Status: COMPLETED | OUTPATIENT
Start: 2019-07-08 | End: 2019-07-08

## 2019-07-08 RX ORDER — FUROSEMIDE 40 MG
40 TABLET ORAL ONCE
Refills: 0 | Status: COMPLETED | OUTPATIENT
Start: 2019-07-08 | End: 2019-07-08

## 2019-07-08 RX ORDER — ALPRAZOLAM 0.25 MG
0.25 TABLET ORAL ONCE
Refills: 0 | Status: DISCONTINUED | OUTPATIENT
Start: 2019-07-08 | End: 2019-07-09

## 2019-07-08 RX ORDER — FUROSEMIDE 40 MG
5 TABLET ORAL
Qty: 500 | Refills: 0 | Status: DISCONTINUED | OUTPATIENT
Start: 2019-07-08 | End: 2019-07-12

## 2019-07-08 RX ORDER — METOPROLOL TARTRATE 50 MG
5 TABLET ORAL ONCE
Refills: 0 | Status: COMPLETED | OUTPATIENT
Start: 2019-07-08 | End: 2019-07-08

## 2019-07-08 RX ADMIN — Medication 0.25 MILLIGRAM(S): at 12:55

## 2019-07-08 RX ADMIN — HEPARIN SODIUM 5000 UNIT(S): 5000 INJECTION INTRAVENOUS; SUBCUTANEOUS at 06:00

## 2019-07-08 RX ADMIN — Medication 50 MICROGRAM(S): at 05:37

## 2019-07-08 RX ADMIN — Medication 100 MILLIGRAM(S): at 17:11

## 2019-07-08 RX ADMIN — VASOPRESSIN 2.4 UNIT(S)/MIN: 20 INJECTION INTRAVENOUS at 02:00

## 2019-07-08 RX ADMIN — Medication 400 MILLIGRAM(S): at 12:21

## 2019-07-08 RX ADMIN — Medication 3 MILLIGRAM(S): at 21:46

## 2019-07-08 RX ADMIN — CHLORHEXIDINE GLUCONATE 1 APPLICATION(S): 213 SOLUTION TOPICAL at 21:44

## 2019-07-08 RX ADMIN — LIDOCAINE 1 PATCH: 4 CREAM TOPICAL at 21:49

## 2019-07-08 RX ADMIN — Medication 2: at 11:09

## 2019-07-08 RX ADMIN — PANTOPRAZOLE SODIUM 40 MILLIGRAM(S): 20 TABLET, DELAYED RELEASE ORAL at 05:38

## 2019-07-08 RX ADMIN — LIDOCAINE 1 PATCH: 4 CREAM TOPICAL at 06:34

## 2019-07-08 RX ADMIN — Medication 3.75 MG/HR: at 01:04

## 2019-07-08 RX ADMIN — SENNA PLUS 2 TABLET(S): 8.6 TABLET ORAL at 22:26

## 2019-07-08 RX ADMIN — Medication 10 UNIT(S): at 11:10

## 2019-07-08 RX ADMIN — Medication 40 MILLIGRAM(S): at 00:38

## 2019-07-08 RX ADMIN — Medication 10 UNIT(S): at 08:39

## 2019-07-08 RX ADMIN — Medication 100 MILLIGRAM(S): at 05:38

## 2019-07-08 RX ADMIN — MONTELUKAST 10 MILLIGRAM(S): 4 TABLET, CHEWABLE ORAL at 11:08

## 2019-07-08 RX ADMIN — TICAGRELOR 90 MILLIGRAM(S): 90 TABLET ORAL at 05:37

## 2019-07-08 RX ADMIN — Medication 5 MILLIGRAM(S): at 23:50

## 2019-07-08 RX ADMIN — Medication 5 MILLIGRAM(S): at 22:05

## 2019-07-08 RX ADMIN — HEPARIN SODIUM 5000 UNIT(S): 5000 INJECTION INTRAVENOUS; SUBCUTANEOUS at 21:45

## 2019-07-08 RX ADMIN — VASOPRESSIN 2.4 UNIT(S)/MIN: 20 INJECTION INTRAVENOUS at 11:08

## 2019-07-08 RX ADMIN — Medication 3.75 MG/HR: at 11:10

## 2019-07-08 RX ADMIN — Medication 10 UNIT(S): at 17:11

## 2019-07-08 RX ADMIN — Medication 1000 MILLIGRAM(S): at 12:35

## 2019-07-08 RX ADMIN — INSULIN GLARGINE 25 UNIT(S): 100 INJECTION, SOLUTION SUBCUTANEOUS at 21:44

## 2019-07-08 RX ADMIN — ATORVASTATIN CALCIUM 40 MILLIGRAM(S): 80 TABLET, FILM COATED ORAL at 21:43

## 2019-07-08 RX ADMIN — POLYETHYLENE GLYCOL 3350 17 GRAM(S): 17 POWDER, FOR SOLUTION ORAL at 11:08

## 2019-07-08 RX ADMIN — Medication 12.5 MILLIGRAM(S): at 05:37

## 2019-07-08 RX ADMIN — AMIODARONE HYDROCHLORIDE 600 MILLIGRAM(S): 400 TABLET ORAL at 01:03

## 2019-07-08 RX ADMIN — LIDOCAINE 1 PATCH: 4 CREAM TOPICAL at 09:00

## 2019-07-08 RX ADMIN — Medication 81 MILLIGRAM(S): at 11:08

## 2019-07-08 RX ADMIN — Medication 3 MILLIGRAM(S): at 00:10

## 2019-07-08 RX ADMIN — Medication 3: at 08:41

## 2019-07-08 RX ADMIN — TICAGRELOR 90 MILLIGRAM(S): 90 TABLET ORAL at 17:11

## 2019-07-08 RX ADMIN — Medication 650 MILLIGRAM(S): at 08:37

## 2019-07-08 NOTE — PROGRESS NOTE ADULT - SUBJECTIVE AND OBJECTIVE BOX
CHIEF COMPLAINT:  patient was seen earlier today in CCU no family were in the bedside patient awake alert complain of neck pain and mild SOB discussed with medical resident about her complaint and also anxiety  SUBJECTIVE:     REVIEW OF SYSTEMS:    CONSTITUTIONAL: ( x )  weakness,  (  ) fevers or chills  EYES/ENT: (  )visual changes;     NECK: (  ) pain or stiffness  RESPIRATORY:   (  )cough, wheezing, hemoptysis;  ( x ) shortness of breath  CARDIOVASCULAR:  (  )chest pain or palpitations  GASTROINTESTINAL:   (  )abdominal or epigastric pain.  (  ) nausea, vomiting, or hematemesis;   (   ) diarrhea or constipation.   GENITOURINARY:   (    ) dysuria, frequency or hematuria  NEUROLOGICAL:  (  x ) numbness or weakness   All other review of systems is negative unless indicated above    Vital Signs Last 24 Hrs  T(C): 36.5 (08 Jul 2019 19:00), Max: 36.7 (08 Jul 2019 04:00)  T(F): 97.7 (08 Jul 2019 19:00), Max: 98 (08 Jul 2019 04:00)  HR: 124 (08 Jul 2019 22:23) (60 - 136)  BP: 96/59 (08 Jul 2019 22:23) (73/45 - 108/60)  BP(mean): 75 (08 Jul 2019 22:23) (53 - 85)  RR: 49 (08 Jul 2019 22:23) (12 - 58)  SpO2: 99% (08 Jul 2019 22:23) (90% - 100%)    I&O's Summary    07 Jul 2019 07:01  -  08 Jul 2019 07:00  --------------------------------------------------------  IN: 704.3 mL / OUT: 1020 mL / NET: -315.7 mL    08 Jul 2019 07:01  -  08 Jul 2019 22:50  --------------------------------------------------------  IN: 340.8 mL / OUT: 540 mL / NET: -199.2 mL        CAPILLARY BLOOD GLUCOSE      POCT Blood Glucose.: 185 mg/dL (08 Jul 2019 22:18)  POCT Blood Glucose.: 112 mg/dL (08 Jul 2019 17:11)  POCT Blood Glucose.: 204 mg/dL (08 Jul 2019 11:07)  POCT Blood Glucose.: 289 mg/dL (08 Jul 2019 09:13)  POCT Blood Glucose.: 277 mg/dL (08 Jul 2019 08:18)      PHYSICAL EXAM:     General: WN/WD NAD  HEENT: PERRLA, EOMI, moist mucous membranes  Neurology: A&Ox3, nonfocal, CUADRA x 4  Respiratory: b/l crackles  CV: RRR, S1S2, no murmurs, rubs or gallops  Abdominal: Soft, NT, ND +BS, Last BM  Extremities: No edema, + peripheral pulses    MEDICATIONS:  MEDICATIONS  (STANDING):  aspirin enteric coated 81 milliGRAM(s) Oral daily  atorvastatin 40 milliGRAM(s) Oral at bedtime  chlorhexidine 2% Cloths 1 Application(s) Topical daily  dextrose 5%. 1000 milliLiter(s) (50 mL/Hr) IV Continuous <Continuous>  dextrose 50% Injectable 12.5 Gram(s) IV Push once  dextrose 50% Injectable 25 Gram(s) IV Push once  dextrose 50% Injectable 25 Gram(s) IV Push once  docusate sodium 100 milliGRAM(s) Oral two times a day  furosemide Infusion 7.5 mG/Hr (3.75 mL/Hr) IV Continuous <Continuous>  heparin  Injectable 5000 Unit(s) SubCutaneous every 8 hours  insulin glargine Injectable (LANTUS) 25 Unit(s) SubCutaneous at bedtime  insulin lispro (HumaLOG) corrective regimen sliding scale   SubCutaneous three times a day before meals  insulin lispro (HumaLOG) corrective regimen sliding scale   SubCutaneous at bedtime  insulin lispro Injectable (HumaLOG) 10 Unit(s) SubCutaneous three times a day before meals  levothyroxine 50 MICROGram(s) Oral daily  lidocaine   Patch 1 Patch Transdermal daily  melatonin 3 milliGRAM(s) Oral at bedtime  montelukast 10 milliGRAM(s) Oral daily  pantoprazole    Tablet 40 milliGRAM(s) Oral before breakfast  polyethylene glycol 3350 17 Gram(s) Oral daily  senna 2 Tablet(s) Oral at bedtime  ticagrelor 90 milliGRAM(s) Oral every 12 hours  vasopressin Infusion 0.08 Unit(s)/Min (4.8 mL/Hr) IV Continuous <Continuous>      LABS: All Labs Reviewed:                        8.6    11.7  )-----------( 372      ( 08 Jul 2019 06:42 )             26.4                    8.1    7.7   )-----------( 329      ( 07 Jul 2019 01:53 )             24.4     135  |  99  |  55<H>  ----------------------------<  249<H>  4.3   |  18<L>  |  2.33<H>    Ca    8.8      08 Jul 2019 06:42  Phos  5.3     07-08  Mg     2.2     07-08    TPro  7.4  /  Alb  3.5  /  TBili  0.3  /  DBili  x   /  AST  37  /  ALT  35  /  AlkPhos  166<H>  07-08      CARDIAC MARKERS ( 08 Jul 2019 06:42 )  x     / x     / 32 U/L / x     / 1.9 ng/mL      Blood Culture:   Urine Culture      RADIOLOGY/EKG:    ASSESSMENT AND PLAN:   she is  a 72 yo woman with PMHx of HTN, T2DM, CAD s/p CABG (LIMA to LAD, SVG to OM, SVG to PDA 2014 at [facility]), non-dilated ICM (EF 20-25%), severe mitral regurgitation s/p mitral clip (6/13/19 Dr. Newman), severe pulm HTN, CKD, hypothyroidism, who is presenting to ED after experiencing worsening SOB and intermittent chest pain at [facility]. In ED, pt was placed on BiPAP and given lasix 40mg IVPx1.  Transferred to CCU for further management. Undergoing diuresis    Cardiac  Acute decompensated heart failure/volume   -TTE 7/5 showed EF 15-20% with global LV systolic dysfunction  -lasix gtt 7.5  -Strict I/O, trend weights  -Cardiac enzymes plateau'd    Cardioprotection  -ASA, brilinta, statin  -d/c'd lopressor 12.5  -DM management as below  -to trend bp's     Hemodynamics  -on vaso 0.04  -d/c'd beta blocker as above  -continue to trend bp's    EP  -episode of SVT last night. Resolved with amio 150 and beta blocker  -no repeat events  -On tele  -Continue to monitor  -Keep K>4 and Mg>2  -replete electrolytes as needed    Pulmonary  -off bi-pap  -sating well on room air  -continue to trend SpO2    Endo  DM, hyperglycemia  -Hgb A1C 7.4  -started Lantus 25U at bedtime  -started ISS pre meal and at bedtime  -Started lispro 10U pre meal    Hypothyroidism  -On synthroid  -TSH 2.0  -T4, Serum: 6.8 ug/dL (06.12.19 @ 15:17)    Renal, CKD IV   -New baseline of 1.8-2.0 as per nephro  -To trend renal function    GI  -no acute issues  -tolerating po    ID  -afebrile  -no WBC count  -no s and s of infection  -to continue monitoring for signs of infection    Heme  -anemia, chronic  -b/l appx 8-9  -to trend    PPx: HSQ; Protonix;   FEN: none; replete electrolytes PRN; NPO  Code status: Full      Dispo: c/w care on ICU  DVT PPX:    ADVANCED DIRECTIVE:    DISPOSITION:

## 2019-07-08 NOTE — PROGRESS NOTE ADULT - ASSESSMENT
Pt is a 72 yo woman with PMHx of HTN, T2DM, CAD s/p CABG (LIMA to LAD, SVG to OM, SVG to PDA 2014 at [facility]), non-dilated ICM (EF 20-25%), severe mitral regurgitation s/p mitral clip (6/13/19 Dr. Newman), severe pulm HTN, CKD, hypothyroidism, who is presenting to ED after experiencing worsening SOB      A/P:  CKD stage 4:  baseline Scr 1.8-2.0  Renal function fluctuates sec to CHF  Monitor renal function at present    SOB:  intermittent soB  Continue Diuresis per cardiology

## 2019-07-08 NOTE — PROGRESS NOTE ADULT - SUBJECTIVE AND OBJECTIVE BOX
PATIENT:  SELVIN CLAIRE  [id number]    CHIEF COMPLAINT:  Patient is a 73y old  Female who presents with a chief complaint of ADHF (07 Jul 2019 19:15)      INTERVAL HISTORYOVERNIGHT EVENTS:      REVIEW OF SYSTEMS:    Constitutional:     [ ] negative [ ] fevers [ ] chills [ ] weight loss [ ] weight gain  HEENT:                  [ ] negative [ ] dry eyes [ ] eye irritation [ ] postnasal drip [ ] nasal congestion  CV:                         [ ] negative  [ ] chest pain [ ] orthopnea [ ] palpitations [ ] murmur  Resp:                     [ ] negative [ ] cough [ ] shortness of breath [ ] dyspnea [ ] wheezing [ ] sputum [ ] hemoptysis  GI:                          [ ] negative [ ] nausea [ ] vomiting [ ] diarrhea [ ] constipation [ ] abd pain [ ] dysphagia   :                        [ ] negative [ ] dysuria [ ] nocturia [ ] hematuria [ ] increased urinary frequency  Musculoskeletal: [ ] negative [ ] back pain [ ] myalgias [ ] arthralgias [ ] fracture  Skin:                       [ ] negative [ ] rash [ ] itch  Neurological:        [ ] negative [ ] headache [ ] dizziness [ ] syncope [ ] weakness [ ] numbness  Psychiatric:           [ ] negative [ ] anxiety [ ] depression  Endocrine:            [ ] negative [ ] diabetes [ ] thyroid problem  Heme/Lymph:      [ ] negative [ ] anemia [ ] bleeding problem  Allergic/Immune: [ ] negative [ ] itchy eyes [ ] nasal discharge [ ] hives [ ] angioedema    [ ] All other systems negative  [ ] Unable to assess ROS because ________.    MEDICATIONS:  MEDICATIONS  (STANDING):  aspirin enteric coated 81 milliGRAM(s) Oral daily  atorvastatin 40 milliGRAM(s) Oral at bedtime  chlorhexidine 2% Cloths 1 Application(s) Topical daily  dextrose 5%. 1000 milliLiter(s) (50 mL/Hr) IV Continuous <Continuous>  dextrose 50% Injectable 12.5 Gram(s) IV Push once  dextrose 50% Injectable 25 Gram(s) IV Push once  dextrose 50% Injectable 25 Gram(s) IV Push once  docusate sodium 100 milliGRAM(s) Oral two times a day  furosemide Infusion 7.5 mG/Hr (3.75 mL/Hr) IV Continuous <Continuous>  heparin  Injectable 5000 Unit(s) SubCutaneous every 8 hours  insulin glargine Injectable (LANTUS) 25 Unit(s) SubCutaneous at bedtime  insulin lispro (HumaLOG) corrective regimen sliding scale   SubCutaneous three times a day before meals  insulin lispro (HumaLOG) corrective regimen sliding scale   SubCutaneous at bedtime  insulin lispro Injectable (HumaLOG) 10 Unit(s) SubCutaneous three times a day before meals  levothyroxine 50 MICROGram(s) Oral daily  lidocaine   Patch 1 Patch Transdermal daily  melatonin 3 milliGRAM(s) Oral at bedtime  metoprolol tartrate 12.5 milliGRAM(s) Oral two times a day  montelukast 10 milliGRAM(s) Oral daily  pantoprazole    Tablet 40 milliGRAM(s) Oral before breakfast  polyethylene glycol 3350 17 Gram(s) Oral daily  senna 2 Tablet(s) Oral at bedtime  ticagrelor 90 milliGRAM(s) Oral every 12 hours  vasopressin Infusion 0.04 Unit(s)/Min (2.4 mL/Hr) IV Continuous <Continuous>    MEDICATIONS  (PRN):  acetaminophen   Tablet .. 650 milliGRAM(s) Oral every 6 hours PRN Mild Pain (1 - 3), Moderate Pain (4 - 6)  dextrose 40% Gel 15 Gram(s) Oral once PRN Blood Glucose LESS THAN 70 milliGRAM(s)/deciliter  glucagon  Injectable 1 milliGRAM(s) IntraMuscular once PRN Glucose LESS THAN 70 milligrams/deciliter      ALLERGIES:  Allergies    azithromycin (Hives; Pruritus)    Intolerances        OBJECTIVE:  ICU Vital Signs Last 24 Hrs  T(C): 36.4 (07 Jul 2019 23:55), Max: 36.6 (07 Jul 2019 17:00)  T(F): 97.5 (07 Jul 2019 23:55), Max: 97.9 (07 Jul 2019 17:00)  HR: 66 (08 Jul 2019 05:11) (58 - 156)  BP: 86/57 (08 Jul 2019 04:15) (72/55 - 101/55)  BP(mean): 63 (08 Jul 2019 04:15) (53 - 78)  ABP: --  ABP(mean): --  RR: 35 (08 Jul 2019 04:15) (12 - 58)  SpO2: 98% (08 Jul 2019 05:11) (98% - 100%)      Adult Advanced Hemodynamics Last 24 Hrs  CVP(mm Hg): --  CVP(cm H2O): --  CO: --  CI: --  PA: --  PA(mean): --  PCWP: --  SVR: --  SVRI: --  PVR: --  PVRI: --  CAPILLARY BLOOD GLUCOSE      POCT Blood Glucose.: 151 mg/dL (07 Jul 2019 21:14)  POCT Blood Glucose.: 215 mg/dL (07 Jul 2019 16:56)  POCT Blood Glucose.: 293 mg/dL (07 Jul 2019 12:12)  POCT Blood Glucose.: 242 mg/dL (07 Jul 2019 08:50)    CAPILLARY BLOOD GLUCOSE      POCT Blood Glucose.: 151 mg/dL (07 Jul 2019 21:14)    I&O's Summary    06 Jul 2019 07:01  -  07 Jul 2019 07:00  --------------------------------------------------------  IN: 437.5 mL / OUT: 1810 mL / NET: -1372.5 mL    07 Jul 2019 07:01  -  08 Jul 2019 06:36  --------------------------------------------------------  IN: 520.9 mL / OUT: 820 mL / NET: -299.1 mL      Daily     Daily     PHYSICAL EXAMINATION:  General: WN/WD NAD  HEENT: PERRLA, EOMI, moist mucous membranes  Neurology: A&Ox3, nonfocal, CUADRA x 4  Respiratory: CTA B/L, normal respiratory effort, no wheezes, crackles, rales  CV: RRR, S1S2, no murmurs, rubs or gallops  Abdominal: Soft, NT, ND +BS, Last BM  Extremities: No edema, + peripheral pulses  Incisions:   Tubes:    LABS:                          8.1    7.7   )-----------( 329      ( 07 Jul 2019 01:53 )             24.4     07-07    137  |  99  |  53<H>  ----------------------------<  153<H>  3.8   |  21<L>  |  2.35<H>    Ca    8.3<L>      07 Jul 2019 23:34  Phos  4.5     07-07  Mg     2.1     07-07    TPro  7.2  /  Alb  3.4  /  TBili  0.3  /  DBili  x   /  AST  26  /  ALT  29  /  AlkPhos  166<H>  07-07    LIVER FUNCTIONS - ( 07 Jul 2019 23:34 )  Alb: 3.4 g/dL / Pro: 7.2 g/dL / ALK PHOS: 166 U/L / ALT: 29 U/L / AST: 26 U/L / GGT: x               CARDIAC MARKERS ( 06 Jul 2019 15:40 )  x     / x     / 46 U/L / x     / 2.8 ng/mL          TELEMETRY:     EKG:     IMAGING: PATIENT:  SELVIN CLAIRE  [id number]    CHIEF COMPLAINT:  Patient is a 73y old  Female who presents with a chief complaint of ADHF (07 Jul 2019 19:15)      INTERVAL HISTORYOVERNIGHT EVENTS:      REVIEW OF SYSTEMS:    Constitutional:     [x] negative [ ] fevers [ ] chills [ ] weight loss [ ] weight gain  HEENT:                  [x] negative [ ] dry eyes [ ] eye irritation [ ] postnasal drip [ ] nasal congestion  CV:                         [x] negative  [ ] chest pain [ ] orthopnea [ ] palpitations [ ] murmur  Resp:                     [ ] negative [ ] cough [x ] shortness of breath [ ] dyspnea [ ] wheezing [ ] sputum [ ] hemoptysis  GI:                          [- ] negative [ ] nausea [ ] vomiting [ ] diarrhea [ ] constipation [ ] abd pain [ ] dysphagia   :                        [ ] negative [ ] dysuria [ ] nocturia [ ] hematuria [ ] increased urinary frequency  Musculoskeletal: [ ] negative [ ] back pain [ ] myalgias [ ] arthralgias [ ] fracture  Skin:                       [ ] negative [ ] rash [ ] itch  Neurological:        [ ] negative [ ] headache [ ] dizziness [ ] syncope [ ] weakness [ ] numbness  Psychiatric:           [ ] negative [ ] anxiety [ ] depression  Endocrine:            [ ] negative [ ] diabetes [ ] thyroid problem  Heme/Lymph:      [ ] negative [ ] anemia [ ] bleeding problem  Allergic/Immune: [ ] negative [ ] itchy eyes [ ] nasal discharge [ ] hives [ ] angioedema    [ ] All other systems negative  [ ] Unable to assess ROS because ________.    MEDICATIONS:  MEDICATIONS  (STANDING):  aspirin enteric coated 81 milliGRAM(s) Oral daily  atorvastatin 40 milliGRAM(s) Oral at bedtime  chlorhexidine 2% Cloths 1 Application(s) Topical daily  dextrose 5%. 1000 milliLiter(s) (50 mL/Hr) IV Continuous <Continuous>  dextrose 50% Injectable 12.5 Gram(s) IV Push once  dextrose 50% Injectable 25 Gram(s) IV Push once  dextrose 50% Injectable 25 Gram(s) IV Push once  docusate sodium 100 milliGRAM(s) Oral two times a day  furosemide Infusion 7.5 mG/Hr (3.75 mL/Hr) IV Continuous <Continuous>  heparin  Injectable 5000 Unit(s) SubCutaneous every 8 hours  insulin glargine Injectable (LANTUS) 25 Unit(s) SubCutaneous at bedtime  insulin lispro (HumaLOG) corrective regimen sliding scale   SubCutaneous three times a day before meals  insulin lispro (HumaLOG) corrective regimen sliding scale   SubCutaneous at bedtime  insulin lispro Injectable (HumaLOG) 10 Unit(s) SubCutaneous three times a day before meals  levothyroxine 50 MICROGram(s) Oral daily  lidocaine   Patch 1 Patch Transdermal daily  melatonin 3 milliGRAM(s) Oral at bedtime  metoprolol tartrate 12.5 milliGRAM(s) Oral two times a day  montelukast 10 milliGRAM(s) Oral daily  pantoprazole    Tablet 40 milliGRAM(s) Oral before breakfast  polyethylene glycol 3350 17 Gram(s) Oral daily  senna 2 Tablet(s) Oral at bedtime  ticagrelor 90 milliGRAM(s) Oral every 12 hours  vasopressin Infusion 0.04 Unit(s)/Min (2.4 mL/Hr) IV Continuous <Continuous>    MEDICATIONS  (PRN):  acetaminophen   Tablet .. 650 milliGRAM(s) Oral every 6 hours PRN Mild Pain (1 - 3), Moderate Pain (4 - 6)  dextrose 40% Gel 15 Gram(s) Oral once PRN Blood Glucose LESS THAN 70 milliGRAM(s)/deciliter  glucagon  Injectable 1 milliGRAM(s) IntraMuscular once PRN Glucose LESS THAN 70 milligrams/deciliter      ALLERGIES:  Allergies    azithromycin (Hives; Pruritus)    Intolerances        OBJECTIVE:  ICU Vital Signs Last 24 Hrs  T(C): 36.4 (07 Jul 2019 23:55), Max: 36.6 (07 Jul 2019 17:00)  T(F): 97.5 (07 Jul 2019 23:55), Max: 97.9 (07 Jul 2019 17:00)  HR: 66 (08 Jul 2019 05:11) (58 - 156)  BP: 86/57 (08 Jul 2019 04:15) (72/55 - 101/55)  BP(mean): 63 (08 Jul 2019 04:15) (53 - 78)  ABP: --  ABP(mean): --  RR: 35 (08 Jul 2019 04:15) (12 - 58)  SpO2: 98% (08 Jul 2019 05:11) (98% - 100%)      Adult Advanced Hemodynamics Last 24 Hrs  CVP(mm Hg): --  CVP(cm H2O): --  CO: --  CI: --  PA: --  PA(mean): --  PCWP: --  SVR: --  SVRI: --  PVR: --  PVRI: --  CAPILLARY BLOOD GLUCOSE      POCT Blood Glucose.: 151 mg/dL (07 Jul 2019 21:14)  POCT Blood Glucose.: 215 mg/dL (07 Jul 2019 16:56)  POCT Blood Glucose.: 293 mg/dL (07 Jul 2019 12:12)  POCT Blood Glucose.: 242 mg/dL (07 Jul 2019 08:50)    CAPILLARY BLOOD GLUCOSE      POCT Blood Glucose.: 151 mg/dL (07 Jul 2019 21:14)    I&O's Summary    06 Jul 2019 07:01  -  07 Jul 2019 07:00  --------------------------------------------------------  IN: 437.5 mL / OUT: 1810 mL / NET: -1372.5 mL    07 Jul 2019 07:01  -  08 Jul 2019 06:36  --------------------------------------------------------  IN: 520.9 mL / OUT: 820 mL / NET: -299.1 mL      Daily     Daily     PHYSICAL EXAMINATION:  General: WN/WD NAD  HEENT: PERRLA, EOMI, moist mucous membranes  Neurology: A&Ox3, nonfocal, CUADRA x 4  Respiratory: CTA B/L, normal respiratory effort, no wheezes, crackles, rales  CV: RRR, S1S2, no murmurs, rubs or gallops  Abdominal: Soft, NT, ND +BS, Last BM  Extremities: No edema, + peripheral pulses  Incisions:   Tubes:    LABS:                          8.1    7.7   )-----------( 329      ( 07 Jul 2019 01:53 )             24.4     07-07    137  |  99  |  53<H>  ----------------------------<  153<H>  3.8   |  21<L>  |  2.35<H>    Ca    8.3<L>      07 Jul 2019 23:34  Phos  4.5     07-07  Mg     2.1     07-07    TPro  7.2  /  Alb  3.4  /  TBili  0.3  /  DBili  x   /  AST  26  /  ALT  29  /  AlkPhos  166<H>  07-07    LIVER FUNCTIONS - ( 07 Jul 2019 23:34 )  Alb: 3.4 g/dL / Pro: 7.2 g/dL / ALK PHOS: 166 U/L / ALT: 29 U/L / AST: 26 U/L / GGT: x               CARDIAC MARKERS ( 06 Jul 2019 15:40 )  x     / x     / 46 U/L / x     / 2.8 ng/mL          TELEMETRY:     EKG:     IMAGING: PATIENT:  SELVIN CLAIRE  [id number]    CHIEF COMPLAINT:  Patient is a 73y old  Female who presents with a chief complaint of ADHF (07 Jul 2019 19:15)      INTERVAL HISTORYOVERNIGHT EVENTS:      REVIEW OF SYSTEMS:    Constitutional:     [x] negative [ ] fevers [ ] chills [ ] weight loss [ ] weight gain  HEENT:                  [x] negative [ ] dry eyes [ ] eye irritation [ ] postnasal drip [ ] nasal congestion  CV:                         [x] negative  [ ] chest pain [ ] orthopnea [ ] palpitations [ ] murmur  Resp:                     [ ] negative [ ] cough [x ] shortness of breath [ ] dyspnea [ ] wheezing [ ] sputum [ ] hemoptysis  GI:                          [- ] negative [ ] nausea [ ] vomiting [ ] diarrhea [ ] constipation [ ] abd pain [ ] dysphagia   :                        [x] negative [ ] dysuria [ ] nocturia [ ] hematuria [ ] increased urinary frequency  Musculoskeletal: [ x] negative [ ] back pain [ ] myalgias [ ] arthralgias [ ] fracture  Skin:                       [ x] negative [ ] rash [ ] itch  Neurological:        [ x] negative [ ] headache [ ] dizziness [ ] syncope [ ] weakness [ ] numbness    MEDICATIONS:  MEDICATIONS  (STANDING):  aspirin enteric coated 81 milliGRAM(s) Oral daily  atorvastatin 40 milliGRAM(s) Oral at bedtime  chlorhexidine 2% Cloths 1 Application(s) Topical daily  dextrose 5%. 1000 milliLiter(s) (50 mL/Hr) IV Continuous <Continuous>  dextrose 50% Injectable 12.5 Gram(s) IV Push once  dextrose 50% Injectable 25 Gram(s) IV Push once  dextrose 50% Injectable 25 Gram(s) IV Push once  docusate sodium 100 milliGRAM(s) Oral two times a day  furosemide Infusion 7.5 mG/Hr (3.75 mL/Hr) IV Continuous <Continuous>  heparin  Injectable 5000 Unit(s) SubCutaneous every 8 hours  insulin glargine Injectable (LANTUS) 25 Unit(s) SubCutaneous at bedtime  insulin lispro (HumaLOG) corrective regimen sliding scale   SubCutaneous three times a day before meals  insulin lispro (HumaLOG) corrective regimen sliding scale   SubCutaneous at bedtime  insulin lispro Injectable (HumaLOG) 10 Unit(s) SubCutaneous three times a day before meals  levothyroxine 50 MICROGram(s) Oral daily  lidocaine   Patch 1 Patch Transdermal daily  melatonin 3 milliGRAM(s) Oral at bedtime  metoprolol tartrate 12.5 milliGRAM(s) Oral two times a day  montelukast 10 milliGRAM(s) Oral daily  pantoprazole    Tablet 40 milliGRAM(s) Oral before breakfast  polyethylene glycol 3350 17 Gram(s) Oral daily  senna 2 Tablet(s) Oral at bedtime  ticagrelor 90 milliGRAM(s) Oral every 12 hours  vasopressin Infusion 0.04 Unit(s)/Min (2.4 mL/Hr) IV Continuous <Continuous>    MEDICATIONS  (PRN):  acetaminophen   Tablet .. 650 milliGRAM(s) Oral every 6 hours PRN Mild Pain (1 - 3), Moderate Pain (4 - 6)  dextrose 40% Gel 15 Gram(s) Oral once PRN Blood Glucose LESS THAN 70 milliGRAM(s)/deciliter  glucagon  Injectable 1 milliGRAM(s) IntraMuscular once PRN Glucose LESS THAN 70 milligrams/deciliter      ALLERGIES:  Allergies    azithromycin (Hives; Pruritus)    Intolerances        OBJECTIVE:  ICU Vital Signs Last 24 Hrs  T(C): 36.4 (07 Jul 2019 23:55), Max: 36.6 (07 Jul 2019 17:00)  T(F): 97.5 (07 Jul 2019 23:55), Max: 97.9 (07 Jul 2019 17:00)  HR: 66 (08 Jul 2019 05:11) (58 - 156)  BP: 86/57 (08 Jul 2019 04:15) (72/55 - 101/55)  BP(mean): 63 (08 Jul 2019 04:15) (53 - 78)  ABP: --  ABP(mean): --  RR: 35 (08 Jul 2019 04:15) (12 - 58)  SpO2: 98% (08 Jul 2019 05:11) (98% - 100%)      Adult Advanced Hemodynamics Last 24 Hrs  CVP(mm Hg): --  CVP(cm H2O): --  CO: --  CI: --  PA: --  PA(mean): --  PCWP: --  SVR: --  SVRI: --  PVR: --  PVRI: --  CAPILLARY BLOOD GLUCOSE      POCT Blood Glucose.: 151 mg/dL (07 Jul 2019 21:14)  POCT Blood Glucose.: 215 mg/dL (07 Jul 2019 16:56)  POCT Blood Glucose.: 293 mg/dL (07 Jul 2019 12:12)  POCT Blood Glucose.: 242 mg/dL (07 Jul 2019 08:50)    CAPILLARY BLOOD GLUCOSE      POCT Blood Glucose.: 151 mg/dL (07 Jul 2019 21:14)    I&O's Summary    06 Jul 2019 07:01  -  07 Jul 2019 07:00  --------------------------------------------------------  IN: 437.5 mL / OUT: 1810 mL / NET: -1372.5 mL    07 Jul 2019 07:01  -  08 Jul 2019 06:36  --------------------------------------------------------  IN: 520.9 mL / OUT: 820 mL / NET: -299.1 mL      Daily     Daily     PHYSICAL EXAMINATION:  General: WN/WD NAD  HEENT: PERRLA, EOMI, moist mucous membranes  Neurology: A&Ox3, nonfocal, CUADRA x 4  Respiratory: b/l crackles  CV: RRR, S1S2, no murmurs, rubs or gallops  Abdominal: Soft, NT, ND +BS, Last BM  Extremities: No edema, + peripheral pulses    LABS:                          8.1    7.7   )-----------( 329      ( 07 Jul 2019 01:53 )             24.4     07-07    137  |  99  |  53<H>  ----------------------------<  153<H>  3.8   |  21<L>  |  2.35<H>    Ca    8.3<L>      07 Jul 2019 23:34  Phos  4.5     07-07  Mg     2.1     07-07    TPro  7.2  /  Alb  3.4  /  TBili  0.3  /  DBili  x   /  AST  26  /  ALT  29  /  AlkPhos  166<H>  07-07    LIVER FUNCTIONS - ( 07 Jul 2019 23:34 )  Alb: 3.4 g/dL / Pro: 7.2 g/dL / ALK PHOS: 166 U/L / ALT: 29 U/L / AST: 26 U/L / GGT: x               CARDIAC MARKERS ( 06 Jul 2019 15:40 )  x     / x     / 46 U/L / x     / 2.8 ng/mL      TELEMETRY:     EKG:     IMAGING:

## 2019-07-08 NOTE — PROGRESS NOTE ADULT - SUBJECTIVE AND OBJECTIVE BOX
[facility] [place]   MD RAHEEL SHAH MD RUORU WONG, PA    TEL:  OFFICE: [phone]  DR SANTOYO CELL: [phone]  JUSTEN KENDALL CELL: [phone]  DR. NGUYEN CELL: [phone]    RENAL FOLLOW UP NOTE  --------------------------------------------------------------------------------  HPI:      Pt seen and examined at bedside.        PAST HISTORY  --------------------------------------------------------------------------------  No significant changes to PMH, PSH, FHx, SHx, unless otherwise noted    ALLERGIES & MEDICATIONS  --------------------------------------------------------------------------------  Allergies    azithromycin (Hives; Pruritus)    Intolerances      Standing Inpatient Medications  aspirin enteric coated 81 milliGRAM(s) Oral daily  atorvastatin 40 milliGRAM(s) Oral at bedtime  chlorhexidine 2% Cloths 1 Application(s) Topical daily  dextrose 5%. 1000 milliLiter(s) IV Continuous <Continuous>  dextrose 50% Injectable 12.5 Gram(s) IV Push once  dextrose 50% Injectable 25 Gram(s) IV Push once  dextrose 50% Injectable 25 Gram(s) IV Push once  docusate sodium 100 milliGRAM(s) Oral two times a day  furosemide Infusion 7.5 mG/Hr IV Continuous <Continuous>  heparin  Injectable 5000 Unit(s) SubCutaneous every 8 hours  insulin glargine Injectable (LANTUS) 25 Unit(s) SubCutaneous at bedtime  insulin lispro (HumaLOG) corrective regimen sliding scale   SubCutaneous three times a day before meals  insulin lispro (HumaLOG) corrective regimen sliding scale   SubCutaneous at bedtime  insulin lispro Injectable (HumaLOG) 10 Unit(s) SubCutaneous three times a day before meals  levothyroxine 50 MICROGram(s) Oral daily  lidocaine   Patch 1 Patch Transdermal daily  melatonin 3 milliGRAM(s) Oral at bedtime  metoprolol tartrate 12.5 milliGRAM(s) Oral two times a day  montelukast 10 milliGRAM(s) Oral daily  pantoprazole    Tablet 40 milliGRAM(s) Oral before breakfast  polyethylene glycol 3350 17 Gram(s) Oral daily  senna 2 Tablet(s) Oral at bedtime  ticagrelor 90 milliGRAM(s) Oral every 12 hours  vasopressin Infusion 0.04 Unit(s)/Min IV Continuous <Continuous>    PRN Inpatient Medications  acetaminophen   Tablet .. 650 milliGRAM(s) Oral every 6 hours PRN  dextrose 40% Gel 15 Gram(s) Oral once PRN  glucagon  Injectable 1 milliGRAM(s) IntraMuscular once PRN      REVIEW OF SYSTEMS  --------------------------------------------------------------------------------  General: no fever  CVS: no chest pain  RESP: + sob, no cough  ABD: no abdominal pain  MSK: no edema     VITALS/PHYSICAL EXAM  --------------------------------------------------------------------------------  T(C): 36.5 (07-08-19 @ 07:00), Max: 36.7 (07-08-19 @ 04:00)  HR: 66 (07-08-19 @ 08:45) (60 - 140)  BP: 92/62 (07-08-19 @ 08:45) (73/45 - 108/60)  RR: 30 (07-08-19 @ 08:45) (12 - 58)  SpO2: 96% (07-08-19 @ 08:45) (96% - 100%)  Wt(kg): --        07-07-19 @ 07:01  -  07-08-19 @ 07:00  --------------------------------------------------------  IN: 704.3 mL / OUT: 1020 mL / NET: -315.7 mL    07-08-19 @ 07:01  -  07-08-19 @ 10:38  --------------------------------------------------------  IN: 6.2 mL / OUT: 65 mL / NET: -58.8 mL      Physical Exam:  	Gen: NAD  	HEENT: MMM  	Pulm: CTA B/L  	CV: S1S2  	Abd: Soft, +BS  	Ext: No LE edema B/L                      Neuro: Awake   	Skin: Warm and Dry   	Rufino cuellar    LABS/STUDIES  --------------------------------------------------------------------------------              8.6    11.7  >-----------<  372      [07-08-19 @ 06:42]              26.4     135  |  99  |  55  ----------------------------<  249      [07-08-19 @ 06:42]  4.3   |  18  |  2.33        Ca     8.8     [07-08-19 @ 06:42]      Mg     2.2     [07-08-19 @ 06:42]      Phos  5.3     [07-08-19 @ 06:42]    TPro  7.4  /  Alb  3.5  /  TBili  0.3  /  DBili  x   /  AST  37  /  ALT  35  /  AlkPhos  166  [07-08-19 @ 06:42]        CK 32      [07-08-19 @ 06:42]    Creatinine Trend:  SCr 2.33 [07-08 @ 06:42]  SCr 2.35 [07-07 @ 23:34]  SCr 2.29 [07-07 @ 15:11]  SCr 2.17 [07-07 @ 01:53]  SCr 2.13 [07-06 @ 15:40]    Urinalysis - [06-14-19 @ 16:33]      Color Light Yellow / Appearance Clear / SG 1.016 / pH 5.5      Gluc 500 mg/dL / Ketone Negative  / Bili Negative / Urobili Negative       Blood Trace / Protein 30 mg/dL / Leuk Est Small / Nitrite Negative      RBC 10 / WBC 10 / Hyaline 2 / Gran  / Sq Epi  / Non Sq Epi 1 / Bacteria Negative    Urine Creatinine 39      [07-07-19 @ 05:09]  Urine Sodium 72      [07-07-19 @ 00:58]  Urine Urea Nitrogen 342      [07-07-19 @ 04:36]    Iron 42, TIBC 348, %sat 12      [07-05-19 @ 22:32]  Ferritin 130      [07-05-19 @ 22:32]  .4 (Ca --)      [04-25-19 @ 05:45]   --  PTH 43.55 (Ca --)      [09-10-18 @ 07:15]   --  PTH 36.60 (Ca --)      [09-08-18 @ 03:15]   --  Vitamin D (25OH) 25.9      [05-01-19 @ 04:00]  HbA1c 7.4      [07-05-19 @ 22:32]  TSH 2.00      [07-05-19 @ 22:32]  Lipid: chol 148, , HDL 35,       [06-01-19 @ 08:00]

## 2019-07-08 NOTE — PROGRESS NOTE ADULT - ASSESSMENT
A/P:      Pulm  No active issues    Cardiac  No active issues    GI  No active issues    Neuro  No active issues    Renal  No active issues    Endo  No active issues    Heme  No active issues    PPx: HSQ; Protonix;   FEN: none; replete electrolytes PRN; NPO  Code status: Full      Dispo: c/w care on ICU  Oscar:  Access/Lines: A/P:    Miss Gamino is 72 yo woman with PMHx of HTN, T2DM, CAD s/p CABG (LIMA to LAD, SVG to OM, SVG to PDA 2014 at [facility]), non-dilated ICM (EF 20-25%), severe mitral regurgitation s/p mitral clip (6/13/19 Dr. Newman), severe pulm HTN, CKD, hypothyroidism, who is presenting to ED after experiencing worsening SOB and intermittent chest pain at [facility]. In ED, pt was placed on BiPAP and given lasix 40mg IVPx1.  Transferred to CCU for further management. Undergoing diuresis    Cardiac  Acute decompensated heart failure/volume   -TTE 7/5 showed EF 15-20% with global LV systolic dysfunction  -lasix gtt 7.5  -Strict I/O, trend weights  -Cardiac enzymes plateau'd    Cardioprotection  -ASA, brilinta, statin  -d/c'd lopressor 12.5  -DM management as below  -to trend bp's    Hemodynamics  -on vaso 0.04  -d/c'd beta blocker as above  -continue to trend bp's    EP  -episode of SVT last night. Resolved with amio 150 and beta blocker  -no repeat events  -On tele  -Continue to monitor  -Keep K>4 and Mg>2  -replete electrolytes as needed    Pulmonary  -off bi-pap  -sating well on room air  -continue to trend SpO2    Endo  DM, hyperglycemia  -Hgb A1C 7.4  -started Lantus 25U at bedtime  -started ISS pre meal and at bedtime  -Started lispro 10U pre meal    Hypothyroidism  -On synthroid  -TSH 2.0  -T4, Serum: 6.8 ug/dL (06.12.19 @ 15:17)    Renal, CKD IV   -New baseline of 1.8-2.0 as per nephro  -To trend renal function    GI  -no acute issues  -tolerating po    ID  -afebrile  -no WBC count  -no s and s of infection  -to continue monitoring for signs of infection    Heme  -anemia, chronic  -b/l appx 8-9  -to trend    PPx: HSQ; Protonix;   FEN: none; replete electrolytes PRN; NPO  Code status: Full      Dispo: c/w care on ICU

## 2019-07-09 LAB
ALBUMIN SERPL ELPH-MCNC: 3.2 G/DL — LOW (ref 3.3–5)
ALBUMIN SERPL ELPH-MCNC: 3.5 G/DL — SIGNIFICANT CHANGE UP (ref 3.3–5)
ALBUMIN SERPL ELPH-MCNC: 3.5 G/DL — SIGNIFICANT CHANGE UP (ref 3.3–5)
ALP SERPL-CCNC: 143 U/L — HIGH (ref 40–120)
ALP SERPL-CCNC: 146 U/L — HIGH (ref 40–120)
ALP SERPL-CCNC: 158 U/L — HIGH (ref 40–120)
ALT FLD-CCNC: 144 U/L — HIGH (ref 10–45)
ALT FLD-CCNC: 798 U/L — HIGH (ref 10–45)
ALT FLD-CCNC: 820 U/L — HIGH (ref 10–45)
ANION GAP SERPL CALC-SCNC: 17 MMOL/L — SIGNIFICANT CHANGE UP (ref 5–17)
ANION GAP SERPL CALC-SCNC: 21 MMOL/L — HIGH (ref 5–17)
ANION GAP SERPL CALC-SCNC: 26 MMOL/L — HIGH (ref 5–17)
APPEARANCE UR: CLEAR — SIGNIFICANT CHANGE UP
AST SERPL-CCNC: 1076 U/L — HIGH (ref 10–40)
AST SERPL-CCNC: 1106 U/L — HIGH (ref 10–40)
AST SERPL-CCNC: 173 U/L — HIGH (ref 10–40)
BACTERIA # UR AUTO: ABNORMAL
BASE EXCESS BLDMV CALC-SCNC: -11.2 MMOL/L — LOW (ref -3–3)
BASE EXCESS BLDMV CALC-SCNC: -3.9 MMOL/L — LOW (ref -3–3)
BASE EXCESS BLDMV CALC-SCNC: -4 MMOL/L — LOW (ref -3–3)
BASE EXCESS BLDMV CALC-SCNC: -7.7 MMOL/L — LOW (ref -3–3)
BASOPHILS # BLD AUTO: 0 K/UL — SIGNIFICANT CHANGE UP (ref 0–0.2)
BILIRUB SERPL-MCNC: 0.4 MG/DL — SIGNIFICANT CHANGE UP (ref 0.2–1.2)
BILIRUB SERPL-MCNC: 0.4 MG/DL — SIGNIFICANT CHANGE UP (ref 0.2–1.2)
BILIRUB SERPL-MCNC: 0.5 MG/DL — SIGNIFICANT CHANGE UP (ref 0.2–1.2)
BILIRUB UR-MCNC: NEGATIVE — SIGNIFICANT CHANGE UP
BUN SERPL-MCNC: 59 MG/DL — HIGH (ref 7–23)
BUN SERPL-MCNC: 64 MG/DL — HIGH (ref 7–23)
BUN SERPL-MCNC: 65 MG/DL — HIGH (ref 7–23)
CALCIUM SERPL-MCNC: 7.6 MG/DL — LOW (ref 8.4–10.5)
CALCIUM SERPL-MCNC: 7.8 MG/DL — LOW (ref 8.4–10.5)
CALCIUM SERPL-MCNC: 7.9 MG/DL — LOW (ref 8.4–10.5)
CHLORIDE SERPL-SCNC: 94 MMOL/L — LOW (ref 96–108)
CHLORIDE SERPL-SCNC: 96 MMOL/L — SIGNIFICANT CHANGE UP (ref 96–108)
CHLORIDE SERPL-SCNC: 99 MMOL/L — SIGNIFICANT CHANGE UP (ref 96–108)
CK MB CFR SERPL CALC: 1.7 NG/ML — SIGNIFICANT CHANGE UP (ref 0–3.8)
CK SERPL-CCNC: 36 U/L — SIGNIFICANT CHANGE UP (ref 25–170)
CK SERPL-CCNC: 42 U/L — SIGNIFICANT CHANGE UP (ref 25–170)
CO2 BLDMV-SCNC: 14 MMOL/L — LOW (ref 21–29)
CO2 BLDMV-SCNC: 17 MMOL/L — LOW (ref 21–29)
CO2 BLDMV-SCNC: 20 MMOL/L — LOW (ref 21–29)
CO2 BLDMV-SCNC: 21 MMOL/L — SIGNIFICANT CHANGE UP (ref 21–29)
CO2 SERPL-SCNC: 14 MMOL/L — LOW (ref 22–31)
CO2 SERPL-SCNC: 17 MMOL/L — LOW (ref 22–31)
CO2 SERPL-SCNC: 18 MMOL/L — LOW (ref 22–31)
COLOR SPEC: YELLOW — SIGNIFICANT CHANGE UP
CREAT SERPL-MCNC: 2.5 MG/DL — HIGH (ref 0.5–1.3)
CREAT SERPL-MCNC: 2.56 MG/DL — HIGH (ref 0.5–1.3)
CREAT SERPL-MCNC: 2.61 MG/DL — HIGH (ref 0.5–1.3)
DIFF PNL FLD: ABNORMAL
EOSINOPHIL # BLD AUTO: 0.3 K/UL — SIGNIFICANT CHANGE UP (ref 0–0.5)
EOSINOPHIL NFR BLD AUTO: 2 % — SIGNIFICANT CHANGE UP (ref 0–6)
EPI CELLS # UR: 0 /HPF — SIGNIFICANT CHANGE UP
GAS PNL BLDMV: SIGNIFICANT CHANGE UP
GAS PNL BLDV: SIGNIFICANT CHANGE UP
GAS PNL BLDV: SIGNIFICANT CHANGE UP
GLUCOSE BLDC GLUCOMTR-MCNC: 154 MG/DL — HIGH (ref 70–99)
GLUCOSE BLDC GLUCOMTR-MCNC: 190 MG/DL — HIGH (ref 70–99)
GLUCOSE BLDC GLUCOMTR-MCNC: 207 MG/DL — HIGH (ref 70–99)
GLUCOSE BLDC GLUCOMTR-MCNC: 317 MG/DL — HIGH (ref 70–99)
GLUCOSE SERPL-MCNC: 178 MG/DL — HIGH (ref 70–99)
GLUCOSE SERPL-MCNC: 200 MG/DL — HIGH (ref 70–99)
GLUCOSE SERPL-MCNC: 218 MG/DL — HIGH (ref 70–99)
GLUCOSE UR QL: NEGATIVE — SIGNIFICANT CHANGE UP
HCO3 BLDMV-SCNC: 14 MMOL/L — LOW (ref 20–28)
HCO3 BLDMV-SCNC: 16 MMOL/L — LOW (ref 20–28)
HCO3 BLDMV-SCNC: 19 MMOL/L — LOW (ref 20–28)
HCO3 BLDMV-SCNC: 20 MMOL/L — SIGNIFICANT CHANGE UP (ref 20–28)
HCT VFR BLD CALC: 28 % — LOW (ref 34.5–45)
HCT VFR BLD CALC: 29.9 % — LOW (ref 34.5–45)
HGB BLD-MCNC: 8.7 G/DL — LOW (ref 11.5–15.5)
HGB BLD-MCNC: 9.8 G/DL — LOW (ref 11.5–15.5)
HOROWITZ INDEX BLDMV+IHG-RTO: 21 — SIGNIFICANT CHANGE UP
HOROWITZ INDEX BLDMV+IHG-RTO: 21 — SIGNIFICANT CHANGE UP
HYALINE CASTS # UR AUTO: 0 /LPF — SIGNIFICANT CHANGE UP (ref 0–2)
KETONES UR-MCNC: NEGATIVE — SIGNIFICANT CHANGE UP
LACTATE BLDV-MCNC: 1.9 MMOL/L — SIGNIFICANT CHANGE UP (ref 0.7–2)
LACTATE SERPL-SCNC: 3.6 MMOL/L — HIGH (ref 0.7–2)
LACTATE SERPL-SCNC: 4 MMOL/L — CRITICAL HIGH (ref 0.7–2)
LACTATE SERPL-SCNC: 7.6 MMOL/L — CRITICAL HIGH (ref 0.7–2)
LEUKOCYTE ESTERASE UR-ACNC: NEGATIVE — SIGNIFICANT CHANGE UP
LYMPHOCYTES # BLD AUTO: 1.5 K/UL — SIGNIFICANT CHANGE UP (ref 1–3.3)
LYMPHOCYTES # BLD AUTO: 4 % — LOW (ref 13–44)
MAGNESIUM SERPL-MCNC: 1.9 MG/DL — SIGNIFICANT CHANGE UP (ref 1.6–2.6)
MAGNESIUM SERPL-MCNC: 2.1 MG/DL — SIGNIFICANT CHANGE UP (ref 1.6–2.6)
MCHC RBC-ENTMCNC: 27.6 PG — SIGNIFICANT CHANGE UP (ref 27–34)
MCHC RBC-ENTMCNC: 29.6 PG — SIGNIFICANT CHANGE UP (ref 27–34)
MCHC RBC-ENTMCNC: 31.1 GM/DL — LOW (ref 32–36)
MCHC RBC-ENTMCNC: 32.9 GM/DL — SIGNIFICANT CHANGE UP (ref 32–36)
MCV RBC AUTO: 89 FL — SIGNIFICANT CHANGE UP (ref 80–100)
MCV RBC AUTO: 90.2 FL — SIGNIFICANT CHANGE UP (ref 80–100)
MONOCYTES # BLD AUTO: 1.1 K/UL — HIGH (ref 0–0.9)
MONOCYTES NFR BLD AUTO: 11 % — SIGNIFICANT CHANGE UP (ref 2–14)
NEUTROPHILS # BLD AUTO: 15.6 K/UL — HIGH (ref 1.8–7.4)
NEUTROPHILS NFR BLD AUTO: 70 % — SIGNIFICANT CHANGE UP (ref 43–77)
NITRITE UR-MCNC: NEGATIVE — SIGNIFICANT CHANGE UP
O2 CT VFR BLD CALC: 33 MMHG — SIGNIFICANT CHANGE UP (ref 30–65)
O2 CT VFR BLD CALC: 36 MMHG — SIGNIFICANT CHANGE UP (ref 30–65)
O2 CT VFR BLD CALC: 38 MMHG — SIGNIFICANT CHANGE UP (ref 30–65)
O2 CT VFR BLD CALC: 41 MMHG — SIGNIFICANT CHANGE UP (ref 30–65)
PCO2 BLDMV: 27 MMHG — LOW (ref 30–65)
PCO2 BLDMV: 28 MMHG — LOW (ref 30–65)
PCO2 BLDMV: 31 MMHG — SIGNIFICANT CHANGE UP (ref 30–65)
PCO2 BLDMV: 31 MMHG — SIGNIFICANT CHANGE UP (ref 30–65)
PH BLDMV: 7.32 — SIGNIFICANT CHANGE UP (ref 7.32–7.45)
PH BLDMV: 7.37 — SIGNIFICANT CHANGE UP (ref 7.32–7.45)
PH BLDMV: 7.42 — SIGNIFICANT CHANGE UP (ref 7.32–7.45)
PH BLDMV: 7.42 — SIGNIFICANT CHANGE UP (ref 7.32–7.45)
PH UR: 6 — SIGNIFICANT CHANGE UP (ref 5–8)
PHOSPHATE SERPL-MCNC: 5.7 MG/DL — HIGH (ref 2.5–4.5)
PLATELET # BLD AUTO: 434 K/UL — HIGH (ref 150–400)
PLATELET # BLD AUTO: 526 K/UL — HIGH (ref 150–400)
POTASSIUM SERPL-MCNC: 3.7 MMOL/L — SIGNIFICANT CHANGE UP (ref 3.5–5.3)
POTASSIUM SERPL-MCNC: 4.2 MMOL/L — SIGNIFICANT CHANGE UP (ref 3.5–5.3)
POTASSIUM SERPL-MCNC: 4.4 MMOL/L — SIGNIFICANT CHANGE UP (ref 3.5–5.3)
POTASSIUM SERPL-SCNC: 3.7 MMOL/L — SIGNIFICANT CHANGE UP (ref 3.5–5.3)
POTASSIUM SERPL-SCNC: 4.2 MMOL/L — SIGNIFICANT CHANGE UP (ref 3.5–5.3)
POTASSIUM SERPL-SCNC: 4.4 MMOL/L — SIGNIFICANT CHANGE UP (ref 3.5–5.3)
PROT SERPL-MCNC: 6.9 G/DL — SIGNIFICANT CHANGE UP (ref 6–8.3)
PROT SERPL-MCNC: 7 G/DL — SIGNIFICANT CHANGE UP (ref 6–8.3)
PROT SERPL-MCNC: 7.2 G/DL — SIGNIFICANT CHANGE UP (ref 6–8.3)
PROT UR-MCNC: SIGNIFICANT CHANGE UP
RBC # BLD: 3.14 M/UL — LOW (ref 3.8–5.2)
RBC # BLD: 3.31 M/UL — LOW (ref 3.8–5.2)
RBC # FLD: 18.2 % — HIGH (ref 10.3–14.5)
RBC # FLD: 18.4 % — HIGH (ref 10.3–14.5)
RBC CASTS # UR COMP ASSIST: 1 /HPF — SIGNIFICANT CHANGE UP (ref 0–4)
SAO2 % BLDMV: 42 % — LOW (ref 60–90)
SAO2 % BLDMV: 52 % — LOW (ref 60–90)
SAO2 % BLDMV: 60 % — SIGNIFICANT CHANGE UP (ref 60–90)
SAO2 % BLDMV: 67 % — SIGNIFICANT CHANGE UP (ref 60–90)
SODIUM SERPL-SCNC: 133 MMOL/L — LOW (ref 135–145)
SODIUM SERPL-SCNC: 134 MMOL/L — LOW (ref 135–145)
SODIUM SERPL-SCNC: 135 MMOL/L — SIGNIFICANT CHANGE UP (ref 135–145)
SP GR SPEC: 1.01 — SIGNIFICANT CHANGE UP (ref 1.01–1.02)
TROPONIN T, HIGH SENSITIVITY RESULT: 183 NG/L — HIGH (ref 0–51)
UROBILINOGEN FLD QL: NEGATIVE — SIGNIFICANT CHANGE UP
WBC # BLD: 18.6 K/UL — HIGH (ref 3.8–10.5)
WBC # BLD: 19.4 K/UL — HIGH (ref 3.8–10.5)
WBC # FLD AUTO: 18.6 K/UL — HIGH (ref 3.8–10.5)
WBC # FLD AUTO: 19.4 K/UL — HIGH (ref 3.8–10.5)
WBC UR QL: 1 /HPF — SIGNIFICANT CHANGE UP (ref 0–5)

## 2019-07-09 PROCEDURE — 71045 X-RAY EXAM CHEST 1 VIEW: CPT | Mod: 26

## 2019-07-09 PROCEDURE — 36556 INSERT NON-TUNNEL CV CATH: CPT

## 2019-07-09 PROCEDURE — 76937 US GUIDE VASCULAR ACCESS: CPT | Mod: 26

## 2019-07-09 PROCEDURE — 93306 TTE W/DOPPLER COMPLETE: CPT | Mod: 26

## 2019-07-09 PROCEDURE — 93010 ELECTROCARDIOGRAM REPORT: CPT

## 2019-07-09 PROCEDURE — 74018 RADEX ABDOMEN 1 VIEW: CPT | Mod: 26

## 2019-07-09 PROCEDURE — ZZZZZ: CPT

## 2019-07-09 RX ORDER — PIPERACILLIN AND TAZOBACTAM 4; .5 G/20ML; G/20ML
3.38 INJECTION, POWDER, LYOPHILIZED, FOR SOLUTION INTRAVENOUS EVERY 12 HOURS
Refills: 0 | Status: DISCONTINUED | OUTPATIENT
Start: 2019-07-09 | End: 2019-07-10

## 2019-07-09 RX ORDER — INSULIN GLARGINE 100 [IU]/ML
28 INJECTION, SOLUTION SUBCUTANEOUS AT BEDTIME
Refills: 0 | Status: DISCONTINUED | OUTPATIENT
Start: 2019-07-09 | End: 2019-07-21

## 2019-07-09 RX ORDER — MAGNESIUM SULFATE 500 MG/ML
2 VIAL (ML) INJECTION ONCE
Refills: 0 | Status: COMPLETED | OUTPATIENT
Start: 2019-07-09 | End: 2019-07-09

## 2019-07-09 RX ORDER — DIGOXIN 250 MCG
0.25 TABLET ORAL ONCE
Refills: 0 | Status: DISCONTINUED | OUTPATIENT
Start: 2019-07-09 | End: 2019-07-09

## 2019-07-09 RX ORDER — DIGOXIN 250 MCG
0.25 TABLET ORAL DAILY
Refills: 0 | Status: DISCONTINUED | OUTPATIENT
Start: 2019-07-09 | End: 2019-07-09

## 2019-07-09 RX ORDER — VANCOMYCIN HCL 1 G
750 VIAL (EA) INTRAVENOUS ONCE
Refills: 0 | Status: COMPLETED | OUTPATIENT
Start: 2019-07-09 | End: 2019-07-09

## 2019-07-09 RX ORDER — DIGOXIN 250 MCG
0.25 TABLET ORAL ONCE
Refills: 0 | Status: COMPLETED | OUTPATIENT
Start: 2019-07-09 | End: 2019-07-09

## 2019-07-09 RX ORDER — POTASSIUM CHLORIDE 20 MEQ
40 PACKET (EA) ORAL ONCE
Refills: 0 | Status: COMPLETED | OUTPATIENT
Start: 2019-07-09 | End: 2019-07-09

## 2019-07-09 RX ORDER — METOPROLOL TARTRATE 50 MG
5 TABLET ORAL ONCE
Refills: 0 | Status: COMPLETED | OUTPATIENT
Start: 2019-07-09 | End: 2019-07-09

## 2019-07-09 RX ORDER — POTASSIUM CHLORIDE 20 MEQ
40 PACKET (EA) ORAL ONCE
Refills: 0 | Status: DISCONTINUED | OUTPATIENT
Start: 2019-07-09 | End: 2019-07-09

## 2019-07-09 RX ORDER — INSULIN LISPRO 100/ML
11 VIAL (ML) SUBCUTANEOUS
Refills: 0 | Status: DISCONTINUED | OUTPATIENT
Start: 2019-07-09 | End: 2019-07-21

## 2019-07-09 RX ORDER — AMIODARONE HYDROCHLORIDE 400 MG/1
150 TABLET ORAL ONCE
Refills: 0 | Status: COMPLETED | OUTPATIENT
Start: 2019-07-09 | End: 2019-07-09

## 2019-07-09 RX ORDER — DOBUTAMINE HCL 250MG/20ML
2.5 VIAL (ML) INTRAVENOUS
Qty: 500 | Refills: 0 | Status: DISCONTINUED | OUTPATIENT
Start: 2019-07-09 | End: 2019-07-11

## 2019-07-09 RX ORDER — VANCOMYCIN HCL 1 G
VIAL (EA) INTRAVENOUS
Refills: 0 | Status: DISCONTINUED | OUTPATIENT
Start: 2019-07-09 | End: 2019-07-11

## 2019-07-09 RX ORDER — DOBUTAMINE HCL 250MG/20ML
5 VIAL (ML) INTRAVENOUS
Qty: 500 | Refills: 0 | Status: DISCONTINUED | OUTPATIENT
Start: 2019-07-09 | End: 2019-07-09

## 2019-07-09 RX ORDER — DIGOXIN 250 MCG
0.25 TABLET ORAL ONCE
Refills: 0 | Status: COMPLETED | OUTPATIENT
Start: 2019-07-10 | End: 2019-07-10

## 2019-07-09 RX ORDER — VANCOMYCIN HCL 1 G
750 VIAL (EA) INTRAVENOUS EVERY 12 HOURS
Refills: 0 | Status: DISCONTINUED | OUTPATIENT
Start: 2019-07-10 | End: 2019-07-11

## 2019-07-09 RX ORDER — FUROSEMIDE 40 MG
40 TABLET ORAL ONCE
Refills: 0 | Status: COMPLETED | OUTPATIENT
Start: 2019-07-09 | End: 2019-07-09

## 2019-07-09 RX ORDER — DIGOXIN 250 MCG
0.5 TABLET ORAL ONCE
Refills: 0 | Status: COMPLETED | OUTPATIENT
Start: 2019-07-09 | End: 2019-07-09

## 2019-07-09 RX ORDER — DIGOXIN 250 MCG
0.12 TABLET ORAL DAILY
Refills: 0 | Status: DISCONTINUED | OUTPATIENT
Start: 2019-07-10 | End: 2019-07-16

## 2019-07-09 RX ORDER — METOPROLOL TARTRATE 50 MG
12.5 TABLET ORAL
Refills: 0 | Status: DISCONTINUED | OUTPATIENT
Start: 2019-07-09 | End: 2019-07-09

## 2019-07-09 RX ADMIN — INSULIN GLARGINE 28 UNIT(S): 100 INJECTION, SOLUTION SUBCUTANEOUS at 22:47

## 2019-07-09 RX ADMIN — PIPERACILLIN AND TAZOBACTAM 25 GRAM(S): 4; .5 INJECTION, POWDER, LYOPHILIZED, FOR SOLUTION INTRAVENOUS at 13:20

## 2019-07-09 RX ADMIN — CHLORHEXIDINE GLUCONATE 1 APPLICATION(S): 213 SOLUTION TOPICAL at 22:48

## 2019-07-09 RX ADMIN — Medication 0.5 MILLIGRAM(S): at 11:39

## 2019-07-09 RX ADMIN — Medication 50 GRAM(S): at 17:51

## 2019-07-09 RX ADMIN — Medication 81 MILLIGRAM(S): at 12:39

## 2019-07-09 RX ADMIN — TICAGRELOR 90 MILLIGRAM(S): 90 TABLET ORAL at 06:58

## 2019-07-09 RX ADMIN — LIDOCAINE 1 PATCH: 4 CREAM TOPICAL at 09:00

## 2019-07-09 RX ADMIN — Medication 2: at 12:38

## 2019-07-09 RX ADMIN — Medication 100 MILLIGRAM(S): at 18:23

## 2019-07-09 RX ADMIN — Medication 10 UNIT(S): at 17:50

## 2019-07-09 RX ADMIN — AMIODARONE HYDROCHLORIDE 600 MILLIGRAM(S): 400 TABLET ORAL at 02:21

## 2019-07-09 RX ADMIN — Medication 250 MILLIGRAM(S): at 15:40

## 2019-07-09 RX ADMIN — Medication 10 UNIT(S): at 08:47

## 2019-07-09 RX ADMIN — VASOPRESSIN 4.8 UNIT(S)/MIN: 20 INJECTION INTRAVENOUS at 08:48

## 2019-07-09 RX ADMIN — Medication 5 MG/HR: at 15:41

## 2019-07-09 RX ADMIN — TICAGRELOR 90 MILLIGRAM(S): 90 TABLET ORAL at 18:23

## 2019-07-09 RX ADMIN — Medication 0.25 MILLIGRAM(S): at 00:00

## 2019-07-09 RX ADMIN — Medication 8.31 MICROGRAM(S)/KG/MIN: at 22:47

## 2019-07-09 RX ADMIN — Medication 4: at 17:50

## 2019-07-09 RX ADMIN — HEPARIN SODIUM 5000 UNIT(S): 5000 INJECTION INTRAVENOUS; SUBCUTANEOUS at 06:55

## 2019-07-09 RX ADMIN — Medication 40 MILLIGRAM(S): at 15:40

## 2019-07-09 RX ADMIN — PANTOPRAZOLE SODIUM 40 MILLIGRAM(S): 20 TABLET, DELAYED RELEASE ORAL at 06:57

## 2019-07-09 RX ADMIN — Medication 5 MILLIGRAM(S): at 02:40

## 2019-07-09 RX ADMIN — Medication 50 MICROGRAM(S): at 06:56

## 2019-07-09 RX ADMIN — Medication 100 MILLIGRAM(S): at 06:56

## 2019-07-09 RX ADMIN — MONTELUKAST 10 MILLIGRAM(S): 4 TABLET, CHEWABLE ORAL at 12:39

## 2019-07-09 RX ADMIN — Medication 5 MG/HR: at 22:48

## 2019-07-09 RX ADMIN — POLYETHYLENE GLYCOL 3350 17 GRAM(S): 17 POWDER, FOR SOLUTION ORAL at 17:51

## 2019-07-09 RX ADMIN — Medication 1: at 08:47

## 2019-07-09 RX ADMIN — Medication 40 MILLIEQUIVALENT(S): at 18:12

## 2019-07-09 RX ADMIN — LIDOCAINE 1 PATCH: 4 CREAM TOPICAL at 06:56

## 2019-07-09 RX ADMIN — Medication 4.16 MICROGRAM(S)/KG/MIN: at 06:00

## 2019-07-09 RX ADMIN — HEPARIN SODIUM 5000 UNIT(S): 5000 INJECTION INTRAVENOUS; SUBCUTANEOUS at 15:40

## 2019-07-09 RX ADMIN — ATORVASTATIN CALCIUM 40 MILLIGRAM(S): 80 TABLET, FILM COATED ORAL at 22:47

## 2019-07-09 RX ADMIN — Medication 4.16 MICROGRAM(S)/KG/MIN: at 08:48

## 2019-07-09 RX ADMIN — Medication 0.25 MILLIGRAM(S): at 18:23

## 2019-07-09 RX ADMIN — Medication 10 UNIT(S): at 12:39

## 2019-07-09 RX ADMIN — Medication 3.75 MG/HR: at 08:48

## 2019-07-09 RX ADMIN — HEPARIN SODIUM 5000 UNIT(S): 5000 INJECTION INTRAVENOUS; SUBCUTANEOUS at 22:47

## 2019-07-09 NOTE — PROGRESS NOTE ADULT - ASSESSMENT
Assessment and Plan:   · Assessment		  A/P:    Miss Gamino is 72 yo woman with PMHx of HTN, T2DM, CAD s/p CABG (LIMA to LAD, SVG to OM, SVG to PDA 2014 at [facility]), non-dilated ICM (EF 20-25%), severe mitral regurgitation s/p mitral clip (6/13/19 Dr. Newman), severe pulm HTN, CKD, hypothyroidism, who is presenting to ED after experiencing worsening SOB and intermittent chest pain at [facility]. In ED, pt was placed on BiPAP and given lasix 40mg IVPx1.  Transferred to CCU for further management. Undergoing diuresis    Cardiac  Acute decompensated heart failure/volume   -TTE 7/5 showed EF 15-20% with global LV systolic dysfunction  -lasix gtt 7.5  -Strict I/O, trend weights  -Cardiac enzymes plateau'd    Cardioprotection  -ASA, brilinta, statin  -d/c'd lopressor 12.5  -DM management as below  -to trend bp's    Hemodynamics  -on vaso 0.08, now dobutamine 0.5  -requiring increased support  -d/c'd beta blocker as above  -continue to trend bp's    EP  -episode of SVT last night. Resolved with amio 150 and beta blocker  -no repeat events  -On tele  -Continue to monitor  -Keep K>4 and Mg>2  -replete electrolytes as needed    Pulmonary  -off bi-pap  -sating well on room air  -continue to trend SpO2    Endo  DM, hyperglycemia  -Hgb A1C 7.4  -started Lantus 25U at bedtime  -started ISS pre meal and at bedtime  -Started lispro 10U pre meal    Hypothyroidism  -On synthroid  -TSH 2.0  -T4, Serum: 6.8 ug/dL (06.12.19 @ 15:17)    Renal, CKD IV   -New baseline of 1.8-2.0 as per nephro  -To trend renal function    GI  -no acute issues  -tolerating po    ID  -afebrile  -WBCs uptrending  -to continue monitoring for signs of infection    Heme  -anemia, chronic  -b/l appx 8-9  -to trend    PPx: HSQ  FEN: none; replete electrolytes PRN; NPO  Code status: Full      Dispo: c/w care on ICU Assessment and Plan:   · Assessment		  A/P:    Miss Gamino is 72 yo woman with PMHx of HTN, T2DM, CAD s/p CABG (LIMA to LAD, SVG to OM, SVG to PDA 2014 at [facility]), non-dilated ICM (EF 20-25%), severe mitral regurgitation s/p mitral clip (6/13/19 Dr. Newman), severe pulm HTN, CKD, hypothyroidism, who is presenting to ED after experiencing worsening SOB and intermittent chest pain at [facility]. In ED, pt was placed on BiPAP and given lasix 40mg IVPx1.  Transferred to CCU for further management. Undergoing diuresis and having multiple episodes of SVT.    Cardiac  Acute decompensated heart failure/volume   -TTE 7/5 showed EF 15-20% with global LV systolic dysfunction  -lasix gtt 7.5  -Strict I/O, trend weights  -Cardiac enzymes plateau'd  -structural heart disease consulted    Cardioprotection  -ASA, brilinta, statin  -d/c'd lopressor 12.5  -DM management as below  -to trend bp's    Hemodynamics  -taking off vaso as elevated SVR point's to elevated afterload  -d/c'd beta blocker as above  -to monitor bp, if can tolerate to start on hydralizine 10mg o/n  -continue to monitor hemodynamics q4    EP  -another episode of SVT last night. Resolved with amio 150 and beta blocker  -now NSR  -started digoxin load  -EP consulted  -On tele  -Continue to monitor  -Keep K>4 and Mg>2  -replete electrolytes as needed    Pulmonary  -acute respiratory distress 2/2 to ADHF, resolved  -off bi-pap  -sating well on room air  -continue to trend SpO2    Endo  DM, hyperglycemia  -Hgb A1C 7.4  -started Lantus 25U at bedtime  -started ISS pre meal and at bedtime  -Started lispro 10U pre meal    Hypothyroidism  -On synthroid  -TSH 2.0  -T4, Serum: 6.8 ug/dL (06.12.19 @ 15:17)    Renal, CKD IV   -New baseline of 1.8-2.0 as per nephro  -Uptrending Cr, likely pre-renal d/t low CO/CI  -anticipating to improve with improving hemodynamics  -To trend renal function    GI  -abdominal pain  -likely 2/2 to bowel edema/ischemia 2/2 to low CO/CI  -KUB (-) for acute abdominal pathology  -anticipate improvement with improving hemodynamics    ID  -afebrile  -WBCs uptrending  -Feliciano Cx ordered  -Started vanc/zosyn  -Urine studies noted moderate bacteria, 1 WBC, (-) nitrites and leukocytes)  -to continue to look for localizing signs of infection      Heme  -anemia, chronic  -b/l appx 8-9  -to trend    PPx: HSQ  FEN: none; replete electrolytes PRN; NPO  Code status: Full      Dispo: c/w care on ICU

## 2019-07-09 NOTE — PROGRESS NOTE ADULT - SUBJECTIVE AND OBJECTIVE BOX
PATIENT:  SELVIN CLAIRE  [id number]    CHIEF COMPLAINT:  Patient is a 73y old  Female who presents with a chief complaint of Hypoxia, SOB (2019 06:50)    Miss Claire is 72 yo woman with PMHx of HTN, T2DM, CAD s/p CABG (LIMA to LAD, SVG to OM, SVG to PDA  at [facility]), non-dilated ICM (EF 20-25%), severe mitral regurgitation s/p mitral clip (19 Dr. Newman), severe pulm HTN, CKD, hypothyroidism, who is presenting to ED after experiencing worsening SOB and intermittent chest pain at [facility]. In ED, pt was placed on BiPAP and given lasix 40mg IVPx1.  Transferred to CCU for further management. Undergoing diuresis      INTERVAL HISTORYOVERNIGHT EVENTS:  SVT for 2 hours. gave 150 amio, beta blocker dose. had nausea, discomfort. heart rate down to 55. MvO2 = 42, CO = 2.2 CI = 1.6. Wedge 25. Placed on dobutamine. Up to 5.0. On vaso 0.08. Lasix gtt. Has swan.     REVIEW OF SYSTEMS:    Constitutional:     [ ] negative [ ] fevers [ ] chills [ ] weight loss [ ] weight gain  HEENT:                  [ ] negative [ ] dry eyes [ ] eye irritation [ ] postnasal drip [ ] nasal congestion  CV:                         [ ] negative  [ ] chest pain [ ] orthopnea [ ] palpitations [ ] murmur  Resp:                     [ ] negative [ ] cough [ ] shortness of breath [ ] dyspnea [ ] wheezing [ ] sputum [ ] hemoptysis  GI:                          [ ] negative [ ] nausea [ ] vomiting [ ] diarrhea [ ] constipation [ ] abd pain [ ] dysphagia   :                        [ ] negative [ ] dysuria [ ] nocturia [ ] hematuria [ ] increased urinary frequency  Musculoskeletal: [ ] negative [ ] back pain [ ] myalgias [ ] arthralgias [ ] fracture  Skin:                       [ ] negative [ ] rash [ ] itch  Neurological:        [ ] negative [ ] headache [ ] dizziness [ ] syncope [ ] weakness [ ] numbness  Psychiatric:           [ ] negative [ ] anxiety [ ] depression  Endocrine:            [ ] negative [ ] diabetes [ ] thyroid problem  Heme/Lymph:      [ ] negative [ ] anemia [ ] bleeding problem  Allergic/Immune: [ ] negative [ ] itchy eyes [ ] nasal discharge [ ] hives [ ] angioedema    [ ] All other systems negative  [ ] Unable to assess ROS because ________.    MEDICATIONS:  MEDICATIONS  (STANDING):  aspirin enteric coated 81 milliGRAM(s) Oral daily  atorvastatin 40 milliGRAM(s) Oral at bedtime  chlorhexidine 2% Cloths 1 Application(s) Topical daily  dextrose 5%. 1000 milliLiter(s) (50 mL/Hr) IV Continuous <Continuous>  dextrose 50% Injectable 12.5 Gram(s) IV Push once  dextrose 50% Injectable 25 Gram(s) IV Push once  dextrose 50% Injectable 25 Gram(s) IV Push once  DOBUTamine Infusion 2.5 MICROgram(s)/kG/Min (4.155 mL/Hr) IV Continuous <Continuous>  docusate sodium 100 milliGRAM(s) Oral two times a day  furosemide Infusion 7.5 mG/Hr (3.75 mL/Hr) IV Continuous <Continuous>  heparin  Injectable 5000 Unit(s) SubCutaneous every 8 hours  insulin glargine Injectable (LANTUS) 25 Unit(s) SubCutaneous at bedtime  insulin lispro (HumaLOG) corrective regimen sliding scale   SubCutaneous three times a day before meals  insulin lispro (HumaLOG) corrective regimen sliding scale   SubCutaneous at bedtime  insulin lispro Injectable (HumaLOG) 10 Unit(s) SubCutaneous three times a day before meals  levothyroxine 50 MICROGram(s) Oral daily  lidocaine   Patch 1 Patch Transdermal daily  melatonin 3 milliGRAM(s) Oral at bedtime  montelukast 10 milliGRAM(s) Oral daily  pantoprazole    Tablet 40 milliGRAM(s) Oral before breakfast  polyethylene glycol 3350 17 Gram(s) Oral daily  senna 2 Tablet(s) Oral at bedtime  ticagrelor 90 milliGRAM(s) Oral every 12 hours  vasopressin Infusion 0.08 Unit(s)/Min (4.8 mL/Hr) IV Continuous <Continuous>    MEDICATIONS  (PRN):  acetaminophen   Tablet .. 650 milliGRAM(s) Oral every 6 hours PRN Mild Pain (1 - 3), Moderate Pain (4 - 6)  dextrose 40% Gel 15 Gram(s) Oral once PRN Blood Glucose LESS THAN 70 milliGRAM(s)/deciliter  glucagon  Injectable 1 milliGRAM(s) IntraMuscular once PRN Glucose LESS THAN 70 milligrams/deciliter      ALLERGIES:  Allergies    azithromycin (Hives; Pruritus)    Intolerances        OBJECTIVE:  ICU Vital Signs Last 24 Hrs  T(C): 36.6 (2019 00:00), Max: 36.6 (2019 00:00)  T(F): 97.9 (2019 00:00), Max: 97.9 (2019 00:00)  HR: 66 (2019 07:53) (46 - 136)  BP: 86/46 (2019 06:45) (76/46 - 104/58)  BP(mean): 62 (2019 06:45) (55 - 81)  ABP: --  ABP(mean): --  RR: 33 (2019 06:45) (10 - 56)  SpO2: 96% (2019 07:53) (89% - 100%)      Adult Advanced Hemodynamics Last 24 Hrs  CVP(mm Hg): 14 (2019 06:45) (0 - 25)  CVP(cm H2O): --  CO: --  CI: --  PA: 64/20 (2019 06:45) (31/17 - 68/24)  PA(mean): 35 (2019 06:45) (25 - 40)  PCWP: --  SVR: --  SVRI: --  PVR: --  PVRI: --  CAPILLARY BLOOD GLUCOSE      POCT Blood Glucose.: 185 mg/dL (2019 22:18)  POCT Blood Glucose.: 112 mg/dL (2019 17:11)  POCT Blood Glucose.: 204 mg/dL (2019 11:07)  POCT Blood Glucose.: 289 mg/dL (2019 09:13)    CAPILLARY BLOOD GLUCOSE      POCT Blood Glucose.: 185 mg/dL (2019 22:18)    I&O's Summary    2019 07:01  -  2019 07:00  --------------------------------------------------------  IN: 568.6 mL / OUT: 810 mL / NET: -241.4 mL      Daily     Daily Weight in k.3 (2019 07:15)    PHYSICAL EXAMINATION:  General: WN/WD NAD  HEENT: PERRLA, EOMI, moist mucous membranes  Neurology: A&Ox3, nonfocal, CUADRA x 4  Respiratory: CTA B/L, normal respiratory effort, no wheezes, crackles, rales  CV: RRR, S1S2, no murmurs, rubs or gallops  Abdominal: Soft, NT, ND +BS, Last BM  Extremities: No edema, + peripheral pulses  Incisions:   Tubes:    LABS:                          9.8    19.4  )-----------( 526      ( 2019 03:50 )             29.9         134<L>  |  94<L>  |  59<H>  ----------------------------<  178<H>  4.4   |  14<L>  |  2.50<H>    Ca    7.9<L>      2019 03:50  Phos  5.3       Mg     2.1         TPro  7.2  /  Alb  3.5  /  TBili  0.5  /  DBili  x   /  AST  173<H>  /  ALT  144<H>  /  AlkPhos  158<H>      LIVER FUNCTIONS - ( 2019 03:50 )  Alb: 3.5 g/dL / Pro: 7.2 g/dL / ALK PHOS: 158 U/L / ALT: 144 U/L / AST: 173 U/L / GGT: x             Creatine Kinase, Serum: 36 U/L ( @ 03:50)  Creatine Kinase, Serum: 42 U/L ( @ 01:29)  CKMB Units: 1.7 ng/mL ( @ 01:29)    CARDIAC MARKERS ( 2019 03:50 )  x     / x     / 36 U/L / x     / x      CARDIAC MARKERS ( 2019 01:29 )  x     / x     / 42 U/L / x     / 1.7 ng/mL  CARDIAC MARKERS ( 2019 06:42 )  x     / x     / 32 U/L / x     / 1.9 ng/mL      TELEMETRY:     EKG:     IMAGING: PATIENT:  SELVIN GAMINO  [id number]    CHIEF COMPLAINT:  Patient is a 73y old  Female who presents with a chief complaint of Hypoxia, SOB (09 Jul 2019 00:25)    Miss Gamino is 74 yo woman with PMHx of HTN, T2DM, CAD s/p CABG (LIMA to LAD, SVG to OM, SVG to PDA 2014 at [facility]), non-dilated ICM (EF 20-25%), severe mitral regurgitation s/p mitral clip (6/13/19 Dr. Newman), severe pulm HTN, CKD, hypothyroidism, who is presenting to ED after experiencing worsening SOB and intermittent chest pain at [facility]. In ED, pt was placed on BiPAP and given lasix 40mg IVPx1.  Transferred to CCU for further management. Undergoing diuresis    INTERVAL HISTORYOVERNIGHT EVENTS:   Pt has SVT w/ 's overnight associated w/ STD on EKG on lactate of 4; s/p 1 dose of 150 amio w/ resolution of SVT.      REVIEW OF SYSTEMS:    Constitutional:     [x ] negative [ ] fevers [ ] chills [ ] weight loss [ ] weight gain  HEENT:                  [ x] negative [ ] dry eyes [ ] eye irritation [ ] postnasal drip [ ] nasal congestion  CV:                         [ x] negative  [ ] chest pain [ ] orthopnea [ ] palpitations [ ] murmur  Resp:                     [x ] negative [ ] cough [ ] shortness of breath [ ] dyspnea [ ] wheezing [ ] sputum [ ] hemoptysis  GI:                          [x ] negative [ ] nausea [ ] vomiting [ ] diarrhea [ ] constipation [ ] abd pain [ ] dysphagia   :                        [ x] negative [ ] dysuria [ ] nocturia [ ] hematuria [ ] increased urinary frequency  Musculoskeletal: [ x] negative [ ] back pain [ ] myalgias [ ] arthralgias [ ] fracture  Skin:                       [ x] negative [ ] rash [ ] itch  Neurological:        [x ] negative [ ] headache [ ] dizziness [ ] syncope [ ] weakness [ ] numbness    MEDICATIONS:  MEDICATIONS  (STANDING):  aspirin enteric coated 81 milliGRAM(s) Oral daily  atorvastatin 40 milliGRAM(s) Oral at bedtime  chlorhexidine 2% Cloths 1 Application(s) Topical daily  dextrose 5%. 1000 milliLiter(s) (50 mL/Hr) IV Continuous <Continuous>  dextrose 50% Injectable 12.5 Gram(s) IV Push once  dextrose 50% Injectable 25 Gram(s) IV Push once  dextrose 50% Injectable 25 Gram(s) IV Push once  DOBUTamine Infusion 2.5 MICROgram(s)/kG/Min (4.155 mL/Hr) IV Continuous <Continuous>  docusate sodium 100 milliGRAM(s) Oral two times a day  furosemide Infusion 7.5 mG/Hr (3.75 mL/Hr) IV Continuous <Continuous>  heparin  Injectable 5000 Unit(s) SubCutaneous every 8 hours  insulin glargine Injectable (LANTUS) 25 Unit(s) SubCutaneous at bedtime  insulin lispro (HumaLOG) corrective regimen sliding scale   SubCutaneous three times a day before meals  insulin lispro (HumaLOG) corrective regimen sliding scale   SubCutaneous at bedtime  insulin lispro Injectable (HumaLOG) 10 Unit(s) SubCutaneous three times a day before meals  levothyroxine 50 MICROGram(s) Oral daily  lidocaine   Patch 1 Patch Transdermal daily  melatonin 3 milliGRAM(s) Oral at bedtime  montelukast 10 milliGRAM(s) Oral daily  pantoprazole    Tablet 40 milliGRAM(s) Oral before breakfast  polyethylene glycol 3350 17 Gram(s) Oral daily  senna 2 Tablet(s) Oral at bedtime  ticagrelor 90 milliGRAM(s) Oral every 12 hours  vasopressin Infusion 0.08 Unit(s)/Min (4.8 mL/Hr) IV Continuous <Continuous>    MEDICATIONS  (PRN):  acetaminophen   Tablet .. 650 milliGRAM(s) Oral every 6 hours PRN Mild Pain (1 - 3), Moderate Pain (4 - 6)  dextrose 40% Gel 15 Gram(s) Oral once PRN Blood Glucose LESS THAN 70 milliGRAM(s)/deciliter  glucagon  Injectable 1 milliGRAM(s) IntraMuscular once PRN Glucose LESS THAN 70 milligrams/deciliter      ALLERGIES:  Allergies    azithromycin (Hives; Pruritus)    Intolerances      OBJECTIVE:  ICU Vital Signs Last 24 Hrs  T(C): 36.6 (09 Jul 2019 00:00), Max: 36.6 (09 Jul 2019 00:00)  T(F): 97.9 (09 Jul 2019 00:00), Max: 97.9 (09 Jul 2019 00:00)  HR: 48 (09 Jul 2019 05:15) (48 - 136)  BP: 78/52 (09 Jul 2019 05:15) (74/54 - 108/60)  BP(mean): 68 (09 Jul 2019 05:15) (55 - 85)  ABP: --  ABP(mean): --  RR: 17 (09 Jul 2019 05:15) (12 - 56)  SpO2: 100% (09 Jul 2019 05:15) (89% - 100%)      Adult Advanced Hemodynamics Last 24 Hrs  CVP(mm Hg): 0 (09 Jul 2019 05:15) (0 - 0)  CVP(cm H2O): --  CO: --  CI: --  PA: 31/17 (09 Jul 2019 05:15) (31/17 - 31/17)  PA(mean): 25 (09 Jul 2019 05:15) (25 - 25)  PCWP: --  SVR: --  SVRI: --  PVR: --  PVRI: --  CAPILLARY BLOOD GLUCOSE      POCT Blood Glucose.: 185 mg/dL (08 Jul 2019 22:18)  POCT Blood Glucose.: 112 mg/dL (08 Jul 2019 17:11)  POCT Blood Glucose.: 204 mg/dL (08 Jul 2019 11:07)  POCT Blood Glucose.: 289 mg/dL (08 Jul 2019 09:13)  POCT Blood Glucose.: 277 mg/dL (08 Jul 2019 08:18)    CAPILLARY BLOOD GLUCOSE      POCT Blood Glucose.: 185 mg/dL (08 Jul 2019 22:18)    I&O's Summary    07 Jul 2019 07:01  -  08 Jul 2019 07:00  --------------------------------------------------------  IN: 704.3 mL / OUT: 1020 mL / NET: -315.7 mL    08 Jul 2019 07:01  -  09 Jul 2019 06:50  --------------------------------------------------------  IN: 376.2 mL / OUT: 650 mL / NET: -273.8 mL      Daily     Daily     PHYSICAL EXAMINATION:  General: WN/WD NAD  HEENT: JOON ALBARRAN, moist mucous membranes  Neurology: A&Ox3, nonfocal, CUADRA x 4  Respiratory: CTA B/L, normal respiratory effort, no wheezes, crackles, rales  CV: RRR, S1S2, no murmurs, rubs or gallops  Abdominal: Soft, NT, ND +BS, Last BM  Extremities: No edema, + peripheral pulses  Incisions:   Tubes:    LABS:                          9.8    19.4  )-----------( 526      ( 09 Jul 2019 03:50 )             29.9     07-09    134<L>  |  94<L>  |  59<H>  ----------------------------<  178<H>  4.4   |  14<L>  |  2.50<H>    Ca    7.9<L>      09 Jul 2019 03:50  Phos  5.3     07-08  Mg     2.1     07-09    TPro  7.2  /  Alb  3.5  /  TBili  0.5  /  DBili  x   /  AST  173<H>  /  ALT  144<H>  /  AlkPhos  158<H>  07-09    LIVER FUNCTIONS - ( 09 Jul 2019 03:50 )  Alb: 3.5 g/dL / Pro: 7.2 g/dL / ALK PHOS: 158 U/L / ALT: 144 U/L / AST: 173 U/L / GGT: x             Creatine Kinase, Serum: 36 U/L (07-09 @ 03:50)  Creatine Kinase, Serum: 42 U/L (07-09 @ 01:29)  CKMB Units: 1.7 ng/mL (07-09 @ 01:29)    CARDIAC MARKERS ( 09 Jul 2019 03:50 )  x     / x     / 36 U/L / x     / x      CARDIAC MARKERS ( 09 Jul 2019 01:29 )  x     / x     / 42 U/L / x     / 1.7 ng/mL  CARDIAC MARKERS ( 08 Jul 2019 06:42 )  x     / x     / 32 U/L / x     / 1.9 ng/mL    TELEMETRY: episodes of SVT to the 140's o/v. Currently NSR    EKG:     IMAGING:        IMAGING:

## 2019-07-09 NOTE — PROGRESS NOTE ADULT - SUBJECTIVE AND OBJECTIVE BOX
CHIEF COMPLAINT: patient was seen at 10 AM awake  and  verbal events overnight and placing a [place]  noted  Patient is a 73y old  Female who presents with a chief complaint of Hypoxia, SOB (09 Jul 2019 06:50)    Miss Gamino is 74 yo woman with PMHx of HTN, T2DM, CAD s/p CABG (LIMA to LAD, SVG to OM, SVG to PDA 2014 at [facility]), non-dilated ICM (EF 20-25%), severe mitral regurgitation s/p mitral clip (6/13/19 Dr. Newman), severe pulm HTN, CKD, hypothyroidism, who is presenting to ED after experiencing worsening SOB and intermittent chest pain at [facility]. In ED, pt was placed on BiPAP and given lasix 40mg IVPx1.  Transferred to CCU for further management. Undergoing diuresis  SUBJECTIVE:     REVIEW OF SYSTEMS:    CONSTITUTIONAL: (x  )  weakness,  (  ) fevers or chills  EYES/ENT: (  )visual changes;     NECK: (  ) pain or stiffness  RESPIRATORY:   (  )cough, wheezing, hemoptysis;  (  ) shortness of breath  CARDIOVASCULAR:  (  )chest pain or palpitations  GASTROINTESTINAL:   (  )abdominal or epigastric pain.  (  ) nausea, vomiting, or hematemesis;   (   ) diarrhea or constipation.   GENITOURINARY:   (    ) dysuria, frequency or hematuria  NEUROLOGICAL:  (   ) numbness or weakness   All other review of systems is negative unless indicated above    Vital Signs Last 24 Hrs  T(C): 36.4 (09 Jul 2019 08:30), Max: 36.6 (09 Jul 2019 00:00)  T(F): 97.5 (09 Jul 2019 08:30), Max: 97.9 (09 Jul 2019 00:00)  HR: 76 (09 Jul 2019 12:30) (46 - 136)  BP: 97/52 (09 Jul 2019 12:30) (76/46 - 144/74)  BP(mean): 75 (09 Jul 2019 12:30) (55 - 115)  RR: 18 (09 Jul 2019 12:30) (10 - 56)  SpO2: 93% (09 Jul 2019 12:30) (89% - 100%)    I&O's Summary    08 Jul 2019 07:01  -  09 Jul 2019 07:00  --------------------------------------------------------  IN: 568.6 mL / OUT: 810 mL / NET: -241.4 mL    09 Jul 2019 07:01  -  09 Jul 2019 13:05  --------------------------------------------------------  IN: 70.1 mL / OUT: 455 mL / NET: -384.9 mL        CAPILLARY BLOOD GLUCOSE      POCT Blood Glucose.: 207 mg/dL (09 Jul 2019 12:16)  POCT Blood Glucose.: 154 mg/dL (09 Jul 2019 08:22)  POCT Blood Glucose.: 185 mg/dL (08 Jul 2019 22:18)  POCT Blood Glucose.: 112 mg/dL (08 Jul 2019 17:11)      PHYSICAL EXAM:     General: WN/WD NAD  HEENT: PERRLA, EOMI, moist mucous membranes  Neurology: A&Ox3, nonfocal, CUADRA x 4  Respiratory: CTA B/L, normal respiratory effort, no wheezes, crackles, rales  CV: RRR, S1S2, no murmurs, rubs or gallops  Abdominal: Soft, NT, ND +BS, Last BM  Extremities: No edema, + peripheral pulses    MEDICATIONS:  MEDICATIONS  (STANDING):  aspirin enteric coated 81 milliGRAM(s) Oral daily  atorvastatin 40 milliGRAM(s) Oral at bedtime  chlorhexidine 2% Cloths 1 Application(s) Topical daily  dextrose 5%. 1000 milliLiter(s) (50 mL/Hr) IV Continuous <Continuous>  dextrose 50% Injectable 12.5 Gram(s) IV Push once  dextrose 50% Injectable 25 Gram(s) IV Push once  dextrose 50% Injectable 25 Gram(s) IV Push once  digoxin     Tablet 0.25 milliGRAM(s) Oral daily  DOBUTamine Infusion 2.5 MICROgram(s)/kG/Min (4.155 mL/Hr) IV Continuous <Continuous>  docusate sodium 100 milliGRAM(s) Oral two times a day  furosemide Infusion 7.5 mG/Hr (3.75 mL/Hr) IV Continuous <Continuous>  heparin  Injectable 5000 Unit(s) SubCutaneous every 8 hours  insulin glargine Injectable (LANTUS) 25 Unit(s) SubCutaneous at bedtime  insulin lispro (HumaLOG) corrective regimen sliding scale   SubCutaneous three times a day before meals  insulin lispro (HumaLOG) corrective regimen sliding scale   SubCutaneous at bedtime  insulin lispro Injectable (HumaLOG) 10 Unit(s) SubCutaneous three times a day before meals  levothyroxine 50 MICROGram(s) Oral daily  lidocaine   Patch 1 Patch Transdermal daily  melatonin 3 milliGRAM(s) Oral at bedtime  montelukast 10 milliGRAM(s) Oral daily  pantoprazole    Tablet 40 milliGRAM(s) Oral before breakfast  piperacillin/tazobactam IVPB.. 3.375 Gram(s) IV Intermittent every 12 hours  polyethylene glycol 3350 17 Gram(s) Oral daily  senna 2 Tablet(s) Oral at bedtime  ticagrelor 90 milliGRAM(s) Oral every 12 hours  vancomycin  IVPB 750 milliGRAM(s) IV Intermittent once  vancomycin  IVPB      vasopressin Infusion 0.08 Unit(s)/Min (4.8 mL/Hr) IV Continuous <Continuous>      LABS: All Labs Reviewed:                        8.7    18.6  )-----------( 434      ( 09 Jul 2019 09:27 )             28.0     07-09    134<L>  |  94<L>  |  59<H>  ----------------------------<  178<H>  4.4   |  14<L>  |  2.50<H>    Ca    7.9<L>      09 Jul 2019 03:50  Phos  5.3     07-08  Mg     2.1     07-09    TPro  7.2  /  Alb  3.5  /  TBili  0.5  /  DBili  x   /  AST  173<H>  /  ALT  144<H>  /  AlkPhos  158<H>  07-09      CARDIAC MARKERS ( 09 Jul 2019 03:50 )  x     / x     / 36 U/L / x     / x      CARDIAC MARKERS ( 09 Jul 2019 01:29 )  x     / x     / 42 U/L / x     / 1.7 ng/mL  CARDIAC MARKERS ( 08 Jul 2019 06:42 )  x     / x     / 32 U/L / x     / 1.9 ng/mL      Blood Culture:   Urine Culture      RADIOLOGY/EKG:    ASSESSMENT AND PLAN:     is 74 yo woman with PMHx of HTN, T2DM, CAD s/p CABG (LIMA to LAD, SVG to OM, SVG to PDA 2014 at [facility]), non-dilated ICM (EF 20-25%), severe mitral regurgitation s/p mitral clip (6/13/19 Dr. Newman), severe pulm HTN, CKD, hypothyroidism, who is presenting to ED after experiencing worsening SOB and intermittent chest pain at [facility]. In ED, pt was placed on BiPAP and given lasix 40mg IVPx1.  Transferred to CCU for further management. Undergoing diuresis    Cardiac  Acute decompensated heart failure/volume   -TTE 7/5 showed EF 15-20% with global LV systolic dysfunction  -lasix gtt 7.5  -Strict I/O, trend weights       Cardioprotection  -ASA, brilinta, statin  -d/c'd lopressor 12.5  -DM management as below  -to trend bp's    Hemodynamics  -on vaso 0.08, now dobutamine 0.5  -requiring increased support  -d/c'd beta blocker as above  -continue to trend bp's    EP  -episode of SVT last night. respond to   amio 150 and beta blocker  -no repeat events       Pulmonary  -off bi-pap  -sating well on room air  -continue to trend SpO2    Endo  DM, hyperglycemia  -Hgb A1C 7.4  -started Lantus 25U at bedtime  -started ISS pre meal and at bedtime  -Started lispro 10U pre meal    Hypothyroidism  -On synthroid  -TSH 2.0  -T4, Serum: 6.8 ug/dL (06.12.19 @ 15:17)    Renal, CKD IV   -New baseline of 1.8-2.0 as per nephro  -To trend renal function    GI  -no acute issues  -tolerating po    ID  -afebrile  -WBCs uptrending her WBC increased to 19,000  -to continue monitoring for signs of infection    Heme  -anemia, chronic  -b/l appx 8-9  -to trend       Code status: Full       continuou CCU care  DVT PPX:    ADVANCED DIRECTIVE:    DISPOSITION:

## 2019-07-09 NOTE — PROGRESS NOTE ADULT - SUBJECTIVE AND OBJECTIVE BOX
====================  CCU MIDNIGHT ROUNDS  ====================    SELVIN CLAIRE  [id number]  Patient is a 73y old  Female who presents with a chief complaint of Hypoxia, SOB (08 Jul 2019 22:50)      ====================  SUMMARY:  Ms. Claire is 72 yo woman with PMHx of HTN, insulin dependent T2DM, CAD s/p CABG (LIMA to LAD, SVG to OM, SVG to PDA 2014 at [facility]), non-dilated ICM (EF 20-25%), severe mitral regurgitation s/p mitral clip (6/13/19 Dr. Newman), severe pulm HTN, CKD stage 4, hypothyroidism, who is presenting from [facility] w/ acute decompensated HF admitted to CCU for closer monitoring  ====================        ====================  NEW EVENTS:  No new events;   ====================        ====================  VITALS (Last 12 hrs):  ====================    T(C): 36.6 (07-09-19 @ 00:00), Max: 36.6 (07-09-19 @ 00:00)  T(F): 97.9 (07-09-19 @ 00:00), Max: 97.9 (07-09-19 @ 00:00)  HR: 133 (07-09-19 @ 00:15) (60 - 136)  BP: 101/63 (07-09-19 @ 00:15) (76/46 - 101/63)  BP(mean): 77 (07-09-19 @ 00:15) (55 - 81)  ABP: --  ABP(mean): --  RR: 22 (07-09-19 @ 00:15) (12 - 41)  SpO2: 89% (07-09-19 @ 00:15) (89% - 100%)  Wt(kg): --  CVP(mm Hg): --  CVP(cm H2O): --  CO: --  CI: --  PA: --  PA(mean): --  PCWP: --  SVR: --  PVR: --    I&O's Summary    07 Jul 2019 07:01  -  08 Jul 2019 07:00  --------------------------------------------------------  IN: 704.3 mL / OUT: 1020 mL / NET: -315.7 mL    08 Jul 2019 07:01  -  09 Jul 2019 00:27  --------------------------------------------------------  IN: 340.8 mL / OUT: 540 mL / NET: -199.2 mL            ====================  NEW LABS:  ====================                          8.6    11.7  )-----------( 372      ( 08 Jul 2019 06:42 )             26.4     07-08    135  |  99  |  55<H>  ----------------------------<  249<H>  4.3   |  18<L>  |  2.33<H>    Ca    8.8      08 Jul 2019 06:42  Phos  5.3     07-08  Mg     2.2     07-08    TPro  7.4  /  Alb  3.5  /  TBili  0.3  /  DBili  x   /  AST  37  /  ALT  35  /  AlkPhos  166<H>  07-08      Creatine Kinase, Serum: 32 U/L (07-08-19 @ 06:42)    CKMB Units: 1.9 ng/mL (07-08 @ 06:42)          ====================  PLAN:  ====================  -       Teresita Nichole DNP  CCU/Cardiology  [id number]  Beeper #[id number] ====================  CCU MIDNIGHT ROUNDS  ====================    SELVIN CLAIRE  [id number]  Patient is a 73y old  Female who presents with a chief complaint of Hypoxia, SOB (08 Jul 2019 22:50)      ====================  SUMMARY:  Ms. Claire is 72 yo woman with PMHx of HTN, insulin dependent T2DM, CAD s/p CABG (LIMA to LAD, SVG to OM, SVG to PDA 2014 at [facility]), non-dilated ICM (EF 20-25%), severe mitral regurgitation s/p mitral clip (6/13/19 Dr. Newman), severe pulm HTN, CKD stage 4, hypothyroidism, who is presenting from [facility] w/ acute decompensated HF admitted to CCU for closer monitoring  ====================        ====================  NEW EVENTS:  Pt has SVT w/ 's overnight associated w/ STD on EKG on lactate of 4; s/p 1 dose of 150 amio w/ resolution of SVT.    ====================        ====================  VITALS (Last 12 hrs):  ====================    T(C): 36.6 (07-09-19 @ 00:00), Max: 36.6 (07-09-19 @ 00:00)  T(F): 97.9 (07-09-19 @ 00:00), Max: 97.9 (07-09-19 @ 00:00)  HR: 133 (07-09-19 @ 00:15) (60 - 136)  BP: 101/63 (07-09-19 @ 00:15) (76/46 - 101/63)  BP(mean): 77 (07-09-19 @ 00:15) (55 - 81)  ABP: --  ABP(mean): --  RR: 22 (07-09-19 @ 00:15) (12 - 41)  SpO2: 89% (07-09-19 @ 00:15) (89% - 100%)  Wt(kg): --  CVP(mm Hg): --  CVP(cm H2O): --  CO: --  CI: --  PA: --  PA(mean): --  PCWP: --  SVR: --  PVR: --    I&O's Summary    07 Jul 2019 07:01  -  08 Jul 2019 07:00  --------------------------------------------------------  IN: 704.3 mL / OUT: 1020 mL / NET: -315.7 mL    08 Jul 2019 07:01  -  09 Jul 2019 00:27  --------------------------------------------------------  IN: 340.8 mL / OUT: 540 mL / NET: -199.2 mL            ====================  NEW LABS:  ====================                          8.6    11.7  )-----------( 372      ( 08 Jul 2019 06:42 )             26.4     07-08    135  |  99  |  55<H>  ----------------------------<  249<H>  4.3   |  18<L>  |  2.33<H>    Ca    8.8      08 Jul 2019 06:42  Phos  5.3     07-08  Mg     2.2     07-08    TPro  7.4  /  Alb  3.5  /  TBili  0.3  /  DBili  x   /  AST  37  /  ALT  35  /  AlkPhos  166<H>  07-08      Creatine Kinase, Serum: 32 U/L (07-08-19 @ 06:42)    CKMB Units: 1.9 ng/mL (07-08 @ 06:42)          ====================  PLAN:  ====================    ====================  #CV  1. Acute on Chronic Systolic HF; decompensated   -  c/w Lasix gtt at 7.5mg/hr, strict I/Os, keep -1.5L  - vaso increased from 0.4 to 0.8   - Monitor urine output closely, may require Zaroxolyn prn    - BMP q12h, supplement lytes prn, keep K>4, Mag >2  - Holding afterload agents for now 2/2 liable BPs  - EP consult in AM for ICD eval     2. CAD s/p CABG (LIMA to LAD, SVG to OM, SVG to PDA 2014    - Cont. DAPT, high intensity statin    3. SVT  - BB d/c'd b/c of soft BPs.  S/P 1 dose of 150 amio w/ resolution of SVT overnight.   - f/u repeat AM lactate

## 2019-07-09 NOTE — PROGRESS NOTE ADULT - ATTENDING COMMENTS
Patient is seen and examined with fellow, NP and the CCU house-staff. I agree with the history, physical and the assessment and plan.  cardiogenic shock on inotropic support  will wean off of vasopressin  off of BB  c/w diuresis

## 2019-07-09 NOTE — PROGRESS NOTE ADULT - SUBJECTIVE AND OBJECTIVE BOX
PATIENT:  SELVIN CLAIRE  [id number]    CHIEF COMPLAINT:  Patient is a 73y old  Female who presents with a chief complaint of Hypoxia, SOB (09 Jul 2019 00:25)    Miss Claire is 74 yo woman with PMHx of HTN, T2DM, CAD s/p CABG (LIMA to LAD, SVG to OM, SVG to PDA 2014 at [facility]), non-dilated ICM (EF 20-25%), severe mitral regurgitation s/p mitral clip (6/13/19 Dr. Newman), severe pulm HTN, CKD, hypothyroidism, who is presenting to ED after experiencing worsening SOB and intermittent chest pain at [facility]. In ED, pt was placed on BiPAP and given lasix 40mg IVPx1.  Transferred to CCU for further management. Undergoing diuresis    INTERVAL HISTORYOVERNIGHT EVENTS:   Pt has SVT w/ 's overnight associated w/ STD on EKG on lactate of 4; s/p 1 dose of 150 amio w/ resolution of SVT.      REVIEW OF SYSTEMS:    Constitutional:     [ ] negative [ ] fevers [ ] chills [ ] weight loss [ ] weight gain  HEENT:                  [ ] negative [ ] dry eyes [ ] eye irritation [ ] postnasal drip [ ] nasal congestion  CV:                         [ ] negative  [ ] chest pain [ ] orthopnea [ ] palpitations [ ] murmur  Resp:                     [ ] negative [ ] cough [ ] shortness of breath [ ] dyspnea [ ] wheezing [ ] sputum [ ] hemoptysis  GI:                          [ ] negative [ ] nausea [ ] vomiting [ ] diarrhea [ ] constipation [ ] abd pain [ ] dysphagia   :                        [ ] negative [ ] dysuria [ ] nocturia [ ] hematuria [ ] increased urinary frequency  Musculoskeletal: [ ] negative [ ] back pain [ ] myalgias [ ] arthralgias [ ] fracture  Skin:                       [ ] negative [ ] rash [ ] itch  Neurological:        [ ] negative [ ] headache [ ] dizziness [ ] syncope [ ] weakness [ ] numbness  Psychiatric:           [ ] negative [ ] anxiety [ ] depression  Endocrine:            [ ] negative [ ] diabetes [ ] thyroid problem  Heme/Lymph:      [ ] negative [ ] anemia [ ] bleeding problem  Allergic/Immune: [ ] negative [ ] itchy eyes [ ] nasal discharge [ ] hives [ ] angioedema    [ ] All other systems negative  [ ] Unable to assess ROS because ________.    MEDICATIONS:  MEDICATIONS  (STANDING):  aspirin enteric coated 81 milliGRAM(s) Oral daily  atorvastatin 40 milliGRAM(s) Oral at bedtime  chlorhexidine 2% Cloths 1 Application(s) Topical daily  dextrose 5%. 1000 milliLiter(s) (50 mL/Hr) IV Continuous <Continuous>  dextrose 50% Injectable 12.5 Gram(s) IV Push once  dextrose 50% Injectable 25 Gram(s) IV Push once  dextrose 50% Injectable 25 Gram(s) IV Push once  DOBUTamine Infusion 2.5 MICROgram(s)/kG/Min (4.155 mL/Hr) IV Continuous <Continuous>  docusate sodium 100 milliGRAM(s) Oral two times a day  furosemide Infusion 7.5 mG/Hr (3.75 mL/Hr) IV Continuous <Continuous>  heparin  Injectable 5000 Unit(s) SubCutaneous every 8 hours  insulin glargine Injectable (LANTUS) 25 Unit(s) SubCutaneous at bedtime  insulin lispro (HumaLOG) corrective regimen sliding scale   SubCutaneous three times a day before meals  insulin lispro (HumaLOG) corrective regimen sliding scale   SubCutaneous at bedtime  insulin lispro Injectable (HumaLOG) 10 Unit(s) SubCutaneous three times a day before meals  levothyroxine 50 MICROGram(s) Oral daily  lidocaine   Patch 1 Patch Transdermal daily  melatonin 3 milliGRAM(s) Oral at bedtime  montelukast 10 milliGRAM(s) Oral daily  pantoprazole    Tablet 40 milliGRAM(s) Oral before breakfast  polyethylene glycol 3350 17 Gram(s) Oral daily  senna 2 Tablet(s) Oral at bedtime  ticagrelor 90 milliGRAM(s) Oral every 12 hours  vasopressin Infusion 0.08 Unit(s)/Min (4.8 mL/Hr) IV Continuous <Continuous>    MEDICATIONS  (PRN):  acetaminophen   Tablet .. 650 milliGRAM(s) Oral every 6 hours PRN Mild Pain (1 - 3), Moderate Pain (4 - 6)  dextrose 40% Gel 15 Gram(s) Oral once PRN Blood Glucose LESS THAN 70 milliGRAM(s)/deciliter  glucagon  Injectable 1 milliGRAM(s) IntraMuscular once PRN Glucose LESS THAN 70 milligrams/deciliter      ALLERGIES:  Allergies    azithromycin (Hives; Pruritus)    Intolerances        OBJECTIVE:  ICU Vital Signs Last 24 Hrs  T(C): 36.6 (09 Jul 2019 00:00), Max: 36.6 (09 Jul 2019 00:00)  T(F): 97.9 (09 Jul 2019 00:00), Max: 97.9 (09 Jul 2019 00:00)  HR: 48 (09 Jul 2019 05:15) (48 - 136)  BP: 78/52 (09 Jul 2019 05:15) (74/54 - 108/60)  BP(mean): 68 (09 Jul 2019 05:15) (55 - 85)  ABP: --  ABP(mean): --  RR: 17 (09 Jul 2019 05:15) (12 - 56)  SpO2: 100% (09 Jul 2019 05:15) (89% - 100%)      Adult Advanced Hemodynamics Last 24 Hrs  CVP(mm Hg): 0 (09 Jul 2019 05:15) (0 - 0)  CVP(cm H2O): --  CO: --  CI: --  PA: 31/17 (09 Jul 2019 05:15) (31/17 - 31/17)  PA(mean): 25 (09 Jul 2019 05:15) (25 - 25)  PCWP: --  SVR: --  SVRI: --  PVR: --  PVRI: --  CAPILLARY BLOOD GLUCOSE      POCT Blood Glucose.: 185 mg/dL (08 Jul 2019 22:18)  POCT Blood Glucose.: 112 mg/dL (08 Jul 2019 17:11)  POCT Blood Glucose.: 204 mg/dL (08 Jul 2019 11:07)  POCT Blood Glucose.: 289 mg/dL (08 Jul 2019 09:13)  POCT Blood Glucose.: 277 mg/dL (08 Jul 2019 08:18)    CAPILLARY BLOOD GLUCOSE      POCT Blood Glucose.: 185 mg/dL (08 Jul 2019 22:18)    I&O's Summary    07 Jul 2019 07:01  -  08 Jul 2019 07:00  --------------------------------------------------------  IN: 704.3 mL / OUT: 1020 mL / NET: -315.7 mL    08 Jul 2019 07:01  -  09 Jul 2019 06:50  --------------------------------------------------------  IN: 376.2 mL / OUT: 650 mL / NET: -273.8 mL      Daily     Daily     PHYSICAL EXAMINATION:  General: WN/WD NAD  HEENT: PERRLA, EOMI, moist mucous membranes  Neurology: A&Ox3, nonfocal, CUADRA x 4  Respiratory: CTA B/L, normal respiratory effort, no wheezes, crackles, rales  CV: RRR, S1S2, no murmurs, rubs or gallops  Abdominal: Soft, NT, ND +BS, Last BM  Extremities: No edema, + peripheral pulses  Incisions:   Tubes:    LABS:                          9.8    19.4  )-----------( 526      ( 09 Jul 2019 03:50 )             29.9     07-09    134<L>  |  94<L>  |  59<H>  ----------------------------<  178<H>  4.4   |  14<L>  |  2.50<H>    Ca    7.9<L>      09 Jul 2019 03:50  Phos  5.3     07-08  Mg     2.1     07-09    TPro  7.2  /  Alb  3.5  /  TBili  0.5  /  DBili  x   /  AST  173<H>  /  ALT  144<H>  /  AlkPhos  158<H>  07-09    LIVER FUNCTIONS - ( 09 Jul 2019 03:50 )  Alb: 3.5 g/dL / Pro: 7.2 g/dL / ALK PHOS: 158 U/L / ALT: 144 U/L / AST: 173 U/L / GGT: x             Creatine Kinase, Serum: 36 U/L (07-09 @ 03:50)  Creatine Kinase, Serum: 42 U/L (07-09 @ 01:29)  CKMB Units: 1.7 ng/mL (07-09 @ 01:29)    CARDIAC MARKERS ( 09 Jul 2019 03:50 )  x     / x     / 36 U/L / x     / x      CARDIAC MARKERS ( 09 Jul 2019 01:29 )  x     / x     / 42 U/L / x     / 1.7 ng/mL  CARDIAC MARKERS ( 08 Jul 2019 06:42 )  x     / x     / 32 U/L / x     / 1.9 ng/mL          TELEMETRY:     EKG:     IMAGING: PATIENT:  SELVIN GAMINO  [id number]    CHIEF COMPLAINT:  Patient is a 73y old  Female who presents with a chief complaint of Hypoxia, SOB (09 Jul 2019 00:25)    Miss Gamino is 72 yo woman with PMHx of HTN, T2DM, CAD s/p CABG (LIMA to LAD, SVG to OM, SVG to PDA 2014 at [facility]), non-dilated ICM (EF 20-25%), severe mitral regurgitation s/p mitral clip (6/13/19 Dr. Newman), severe pulm HTN, CKD, hypothyroidism, who is presenting to ED after experiencing worsening SOB and intermittent chest pain at [facility]. In ED, pt was placed on BiPAP and given lasix 40mg IVPx1.  Transferred to CCU for further management. Undergoing diuresis    INTERVAL HISTORYOVERNIGHT EVENTS:   Pt has SVT w/ 's overnight associated w/ STD on EKG on lactate of 4; s/p 1 dose of 150 amio w/ resolution of SVT.      REVIEW OF SYSTEMS:    Constitutional:     [x ] negative [ ] fevers [ ] chills [ ] weight loss [ ] weight gain  HEENT:                  [ x] negative [ ] dry eyes [ ] eye irritation [ ] postnasal drip [ ] nasal congestion  CV:                         [ x] negative  [ ] chest pain [ ] orthopnea [ ] palpitations [ ] murmur  Resp:                     [x ] negative [ ] cough [ ] shortness of breath [ ] dyspnea [ ] wheezing [ ] sputum [ ] hemoptysis  GI:                          [x ] negative [ ] nausea [ ] vomiting [ ] diarrhea [ ] constipation [ ] abd pain [ ] dysphagia   :                        [ x] negative [ ] dysuria [ ] nocturia [ ] hematuria [ ] increased urinary frequency  Musculoskeletal: [ x] negative [ ] back pain [ ] myalgias [ ] arthralgias [ ] fracture  Skin:                       [ x] negative [ ] rash [ ] itch  Neurological:        [x ] negative [ ] headache [ ] dizziness [ ] syncope [ ] weakness [ ] numbness    MEDICATIONS:  MEDICATIONS  (STANDING):  aspirin enteric coated 81 milliGRAM(s) Oral daily  atorvastatin 40 milliGRAM(s) Oral at bedtime  chlorhexidine 2% Cloths 1 Application(s) Topical daily  dextrose 5%. 1000 milliLiter(s) (50 mL/Hr) IV Continuous <Continuous>  dextrose 50% Injectable 12.5 Gram(s) IV Push once  dextrose 50% Injectable 25 Gram(s) IV Push once  dextrose 50% Injectable 25 Gram(s) IV Push once  DOBUTamine Infusion 2.5 MICROgram(s)/kG/Min (4.155 mL/Hr) IV Continuous <Continuous>  docusate sodium 100 milliGRAM(s) Oral two times a day  furosemide Infusion 7.5 mG/Hr (3.75 mL/Hr) IV Continuous <Continuous>  heparin  Injectable 5000 Unit(s) SubCutaneous every 8 hours  insulin glargine Injectable (LANTUS) 25 Unit(s) SubCutaneous at bedtime  insulin lispro (HumaLOG) corrective regimen sliding scale   SubCutaneous three times a day before meals  insulin lispro (HumaLOG) corrective regimen sliding scale   SubCutaneous at bedtime  insulin lispro Injectable (HumaLOG) 10 Unit(s) SubCutaneous three times a day before meals  levothyroxine 50 MICROGram(s) Oral daily  lidocaine   Patch 1 Patch Transdermal daily  melatonin 3 milliGRAM(s) Oral at bedtime  montelukast 10 milliGRAM(s) Oral daily  pantoprazole    Tablet 40 milliGRAM(s) Oral before breakfast  polyethylene glycol 3350 17 Gram(s) Oral daily  senna 2 Tablet(s) Oral at bedtime  ticagrelor 90 milliGRAM(s) Oral every 12 hours  vasopressin Infusion 0.08 Unit(s)/Min (4.8 mL/Hr) IV Continuous <Continuous>    MEDICATIONS  (PRN):  acetaminophen   Tablet .. 650 milliGRAM(s) Oral every 6 hours PRN Mild Pain (1 - 3), Moderate Pain (4 - 6)  dextrose 40% Gel 15 Gram(s) Oral once PRN Blood Glucose LESS THAN 70 milliGRAM(s)/deciliter  glucagon  Injectable 1 milliGRAM(s) IntraMuscular once PRN Glucose LESS THAN 70 milligrams/deciliter      ALLERGIES:  Allergies    azithromycin (Hives; Pruritus)    Intolerances      OBJECTIVE:  ICU Vital Signs Last 24 Hrs  T(C): 36.6 (09 Jul 2019 00:00), Max: 36.6 (09 Jul 2019 00:00)  T(F): 97.9 (09 Jul 2019 00:00), Max: 97.9 (09 Jul 2019 00:00)  HR: 48 (09 Jul 2019 05:15) (48 - 136)  BP: 78/52 (09 Jul 2019 05:15) (74/54 - 108/60)  BP(mean): 68 (09 Jul 2019 05:15) (55 - 85)  ABP: --  ABP(mean): --  RR: 17 (09 Jul 2019 05:15) (12 - 56)  SpO2: 100% (09 Jul 2019 05:15) (89% - 100%)      Adult Advanced Hemodynamics Last 24 Hrs  CVP(mm Hg): 0 (09 Jul 2019 05:15) (0 - 0)  CVP(cm H2O): --  CO: --  CI: --  PA: 31/17 (09 Jul 2019 05:15) (31/17 - 31/17)  PA(mean): 25 (09 Jul 2019 05:15) (25 - 25)  PCWP: --  SVR: --  SVRI: --  PVR: --  PVRI: --  CAPILLARY BLOOD GLUCOSE      POCT Blood Glucose.: 185 mg/dL (08 Jul 2019 22:18)  POCT Blood Glucose.: 112 mg/dL (08 Jul 2019 17:11)  POCT Blood Glucose.: 204 mg/dL (08 Jul 2019 11:07)  POCT Blood Glucose.: 289 mg/dL (08 Jul 2019 09:13)  POCT Blood Glucose.: 277 mg/dL (08 Jul 2019 08:18)    CAPILLARY BLOOD GLUCOSE      POCT Blood Glucose.: 185 mg/dL (08 Jul 2019 22:18)    I&O's Summary    07 Jul 2019 07:01  -  08 Jul 2019 07:00  --------------------------------------------------------  IN: 704.3 mL / OUT: 1020 mL / NET: -315.7 mL    08 Jul 2019 07:01  -  09 Jul 2019 06:50  --------------------------------------------------------  IN: 376.2 mL / OUT: 650 mL / NET: -273.8 mL      Daily     Daily     PHYSICAL EXAMINATION:  General: WN/WD NAD  HEENT: JOON ALBARRAN, moist mucous membranes  Neurology: A&Ox3, nonfocal, CUADRA x 4  Respiratory: CTA B/L, normal respiratory effort, no wheezes, crackles, rales  CV: RRR, S1S2, no murmurs, rubs or gallops  Abdominal: Soft, NT, ND +BS, Last BM  Extremities: No edema, + peripheral pulses  Incisions:   Tubes:    LABS:                          9.8    19.4  )-----------( 526      ( 09 Jul 2019 03:50 )             29.9     07-09    134<L>  |  94<L>  |  59<H>  ----------------------------<  178<H>  4.4   |  14<L>  |  2.50<H>    Ca    7.9<L>      09 Jul 2019 03:50  Phos  5.3     07-08  Mg     2.1     07-09    TPro  7.2  /  Alb  3.5  /  TBili  0.5  /  DBili  x   /  AST  173<H>  /  ALT  144<H>  /  AlkPhos  158<H>  07-09    LIVER FUNCTIONS - ( 09 Jul 2019 03:50 )  Alb: 3.5 g/dL / Pro: 7.2 g/dL / ALK PHOS: 158 U/L / ALT: 144 U/L / AST: 173 U/L / GGT: x             Creatine Kinase, Serum: 36 U/L (07-09 @ 03:50)  Creatine Kinase, Serum: 42 U/L (07-09 @ 01:29)  CKMB Units: 1.7 ng/mL (07-09 @ 01:29)    CARDIAC MARKERS ( 09 Jul 2019 03:50 )  x     / x     / 36 U/L / x     / x      CARDIAC MARKERS ( 09 Jul 2019 01:29 )  x     / x     / 42 U/L / x     / 1.7 ng/mL  CARDIAC MARKERS ( 08 Jul 2019 06:42 )  x     / x     / 32 U/L / x     / 1.9 ng/mL    TELEMETRY: episodes of SVT to the 140's o/v. Currently NSR    EKG:     IMAGING:

## 2019-07-09 NOTE — CONSULT NOTE ADULT - SUBJECTIVE AND OBJECTIVE BOX
HPI:    Patient is a 73 year old female with past medical history of HTN, HLD, DMT2, pulmonary HTN, CKD, CAD s/p CABG in , last stent 2018, ICM, EF 20- 25%, severe mitral regurgitation s/p mitral clip on 19 with Dr. Newman and hypothyroidism presented to ED with c/o SOB and chest pain from [facility]. Currently admitted with acute decompensated heart failure of lasix and dobutamine drip. Overnight pt noted to have episode of SVT 's, lasting approximately 2 hours,  treated with Amio 150mg IVP and lopressor 5mg IV x 1 then became bradycardiac with HR 40's. EP consulted for further management of SVT.       EP Dr. Uriarte      PAST MEDICAL & SURGICAL HISTORY:  GERD (gastroesophageal reflux disease)  CAD (coronary artery disease): s/p CABG  Hypothyroidism  Hyperlipidemia  Diabetes mellitus, type 2  S/P CABG (coronary artery bypass graft)      ROS:  General: Pt denies recent weight loss/fever/chills  Neurological: denies numbness or  sensation loss  Cardiovascular: denies chest pain/palpitations/leg edema  Respiratory and Thorax: denies SOB/cough/wheezing  Gastrointestinal: denies abdominal pain/diarrhea/constipation/bloody stool  Hematologic: denies abnormal bleeding  	        MEDICATIONS  (STANDING):  aspirin enteric coated 81 milliGRAM(s) Oral daily  atorvastatin 40 milliGRAM(s) Oral at bedtime  chlorhexidine 2% Cloths 1 Application(s) Topical daily  dextrose 5%. 1000 milliLiter(s) (50 mL/Hr) IV Continuous <Continuous>  dextrose 50% Injectable 12.5 Gram(s) IV Push once  dextrose 50% Injectable 25 Gram(s) IV Push once  dextrose 50% Injectable 25 Gram(s) IV Push once  DOBUTamine Infusion 5 MICROgram(s)/kG/Min (8.31 mL/Hr) IV Continuous <Continuous>  docusate sodium 100 milliGRAM(s) Oral two times a day  furosemide Infusion 10 mG/Hr (5 mL/Hr) IV Continuous <Continuous>  heparin  Injectable 5000 Unit(s) SubCutaneous every 8 hours  insulin glargine Injectable (LANTUS) 25 Unit(s) SubCutaneous at bedtime  insulin lispro (HumaLOG) corrective regimen sliding scale   SubCutaneous three times a day before meals  insulin lispro (HumaLOG) corrective regimen sliding scale   SubCutaneous at bedtime  insulin lispro Injectable (HumaLOG) 10 Unit(s) SubCutaneous three times a day before meals  levothyroxine 50 MICROGram(s) Oral daily  lidocaine   Patch 1 Patch Transdermal daily  melatonin 3 milliGRAM(s) Oral at bedtime  montelukast 10 milliGRAM(s) Oral daily  pantoprazole    Tablet 40 milliGRAM(s) Oral before breakfast  piperacillin/tazobactam IVPB.. 3.375 Gram(s) IV Intermittent every 12 hours  polyethylene glycol 3350 17 Gram(s) Oral daily  senna 2 Tablet(s) Oral at bedtime  ticagrelor 90 milliGRAM(s) Oral every 12 hours  vancomycin  IVPB        MEDICATIONS  (PRN):  acetaminophen   Tablet .. 650 milliGRAM(s) Oral every 6 hours PRN Mild Pain (1 - 3), Moderate Pain (4 - 6)  dextrose 40% Gel 15 Gram(s) Oral once PRN Blood Glucose LESS THAN 70 milliGRAM(s)/deciliter  glucagon  Injectable 1 milliGRAM(s) IntraMuscular once PRN Glucose LESS THAN 70 milligrams/deciliter      Allergies    azithromycin (Hives; Pruritus)    Intolerances        SOCIAL HISTORY:    FAMILY HISTORY:  Family history of hypertension (Sibling): sister  Family history of diabetes mellitus (Sibling): brothers/sisters      Vital Signs Last 24 Hrs  T(C): 36.8 (2019 18:00), Max: 36.8 (2019 18:00)  T(F): 98.2 (2019 18:00), Max: 98.2 (2019 18:00)  HR: 86 (2019 18:00) (46 - 136)  BP: 119/59 (2019 18:00) (77/46 - 144/74)  BP(mean): 71 (2019 18:00) (56 - 115)  RR: 22 (2019 18:00) (10 - 56)  SpO2: 96% (2019 18:00) (89% - 100%)    Physical Exam:  Neuro: Alert and oriented x 3   Cardiac: S1, S2 RR  Resp: Bilateral lungs sounds clear to ausculation  Abd: Soft, non distended + BS x 4  Extremities: No edema noted      LABS:                        8.7    18.6  )-----------( 434      ( 2019 09:27 )             28.0     07-09    133<L>  |  99  |  64<H>  ----------------------------<  200<H>  3.7   |  17<L>  |  2.56<H>    Ca    7.8<L>      2019 14:50  Phos  5.7       Mg     1.9         TPro  6.9  /  Alb  3.2<L>  /  TBili  0.4  /  DBili  x   /  AST  1106<H>  /  ALT  820<H>  /  AlkPhos  143<H>        Urinalysis Basic - ( 2019 13:28 )    Color: Yellow / Appearance: Clear / S.012 / pH: x  Gluc: x / Ketone: Negative  / Bili: Negative / Urobili: Negative   Blood: x / Protein: Trace / Nitrite: Negative   Leuk Esterase: Negative / RBC: 1 /hpf / WBC 1 /HPF   Sq Epi: x / Non Sq Epi: 0 /hpf / Bacteria: Moderate        < from: TTE with Doppler (w/Cont) (19 @ 07:49) >  Dimensions:    Normal Values:  LA:     3.4    2.0 - 4.0 cm  Ao:            2.0 - 3.8 cm  SEPTUM: 1.1    0.6 - 1.2 cm  PWT:    1.1    0.6 - 1.1 cm  LVIDd:  4.3    3.0 - 5.6 cm  LVIDs:  3.9    1.8 - 4.0 cm  Derived variables:  LVMI: 109 g/m2  RWT: 0.51  Fractional short: 9 %  EF (Teicholtz): 21 %  Doppler Peak Velocity (m/sec): AoV=1.0  ------------------------------------------------------------------------  Observations:  Mitral Valve: Mitral Valve Clip. Mild mitral regurgitation.    Aortic Valve/Aorta: Normal trileaflet aortic valve. Peak  transaortic valve gradient equals 4 mm Hg. Minimal aortic  regurgitation.  Peak left ventricular outflow tract  gradient equals 1 mmHg.  Normal aortic root.  Left Atrium: Normal left atrium.  Left Ventricle: Severe global left ventricular systolic  dysfunction. Endocardial visualization enhanced with  intravenous injection of Ultrasonic Enhancing Agent  (Definity). Normal left ventricular internal dimensions and  wall thicknesses.  Right Heart: Normal right atrium. The right ventricle is  not well visualized; grossly normal right ventricular  systolic function. Normal tricuspid valve. Mild-moderate  tricuspid regurgitation. Normal pulmonic valve. Mild  pulmonic regurgitation.  Pericardium/Pleura: Normal pericardium with no pericardial  effusion.  Hemodynamic: Estimated right ventricular systolic pressure  equals 67 mm Hg, assuming right atrial pressure equals 15  mm Hg, consistent with severe pulmonary hypertension.  ------------------------------------------------------------------------  Conclusions:  1. Severe global left ventricular systolic dysfunction.  Endocardial visualization enhanced with intravenous  injection of Ultrasonic Enhancing Agent (Definity).  2. The right ventricle is not well visualized; grossly  normal right ventricular systolic function.  3. Estimated right ventricular systolic pressure equals 67  mm Hg, assuming right atrial pressure equals 15 mm Hg,  consistent with severe pulmonary hypertension.  4. Normal tricuspid valve. Mild-moderate tricuspid  regurgitation.    < end of copied text >

## 2019-07-09 NOTE — PROGRESS NOTE ADULT - ASSESSMENT
Pt is a 74 yo woman with PMHx of HTN, T2DM, CAD s/p CABG (LIMA to LAD, SVG to OM, SVG to PDA 2014 at [facility]), non-dilated ICM (EF 20-25%), severe mitral regurgitation s/p mitral clip (6/13/19 Dr. Newman), severe pulm HTN, CKD, hypothyroidism, who is presenting to ED after experiencing worsening SOB      A/P:      MERVIN  MERVIN sec to cardiogenic shock  Scr 2.5 today  PT non -oliguric  Continue ionotropic support per CCU  Monitor renal function   Avoid further nephrotoxics, NSAIDS RCA    CKD stage 4:  baseline Scr 1.8-2.0  Renal function fluctuates sec to CHF  Monitor renal function at present    SOB:  in setting of HF  Continue care per CCU    ACidosis   sec to RF  mOnitor serum Co2 at present

## 2019-07-09 NOTE — PROGRESS NOTE ADULT - ASSESSMENT
A/P:    Miss Gamino is 72 yo woman with PMHx of HTN, T2DM, CAD s/p CABG (LIMA to LAD, SVG to OM, SVG to PDA 2014 at [facility]), non-dilated ICM (EF 20-25%), severe mitral regurgitation s/p mitral clip (6/13/19 Dr. Newman), severe pulm HTN, CKD, hypothyroidism, who is presenting to ED after experiencing worsening SOB and intermittent chest pain at [facility]. In ED, pt was placed on BiPAP and given lasix 40mg IVPx1.  Transferred to CCU for further management. Undergoing diuresis    Cardiac  Acute decompensated heart failure/volume   -TTE 7/5 showed EF 15-20% with global LV systolic dysfunction  -lasix gtt 7.5  -Strict I/O, trend weights  -Cardiac enzymes plateau'd    Cardioprotection  -ASA, brilinta, statin  -d/c'd lopressor 12.5  -DM management as below  -to trend bp's    Hemodynamics  -on vaso 0.04  -d/c'd beta blocker as above  -continue to trend bp's    EP  -episode of SVT last night. Resolved with amio 150 and beta blocker  -no repeat events  -On tele  -Continue to monitor  -Keep K>4 and Mg>2  -replete electrolytes as needed    Pulmonary  -off bi-pap  -sating well on room air  -continue to trend SpO2    Endo  DM, hyperglycemia  -Hgb A1C 7.4  -started Lantus 25U at bedtime  -started ISS pre meal and at bedtime  -Started lispro 10U pre meal    Hypothyroidism  -On synthroid  -TSH 2.0  -T4, Serum: 6.8 ug/dL (06.12.19 @ 15:17)    Renal, CKD IV   -New baseline of 1.8-2.0 as per nephro  -To trend renal function    GI  -no acute issues  -tolerating po    ID  -afebrile  -no WBC count  -no s and s of infection  -to continue monitoring for signs of infection    Heme  -anemia, chronic  -b/l appx 8-9  -to trend    PPx: HSQ  FEN: none; replete electrolytes PRN; NPO  Code status: Full      Dispo: c/w care on ICU A/P:    Miss Gamino is 72 yo woman with PMHx of HTN, T2DM, CAD s/p CABG (LIMA to LAD, SVG to OM, SVG to PDA 2014 at [facility]), non-dilated ICM (EF 20-25%), severe mitral regurgitation s/p mitral clip (6/13/19 Dr. Newman), severe pulm HTN, CKD, hypothyroidism, who is presenting to ED after experiencing worsening SOB and intermittent chest pain at [facility]. In ED, pt was placed on BiPAP and given lasix 40mg IVPx1.  Transferred to CCU for further management. Undergoing diuresis and having multiple episodes of SVT.    Cardiac  Acute decompensated heart failure/volume   -TTE 7/5 showed EF 15-20% with global LV systolic dysfunction  -lasix gtt 7.5  -Strict I/O, trend weights  -Cardiac enzymes plateau'd  -structural heart disease consulted    Cardioprotection  -ASA, brilinta, statin  -d/c'd lopressor 12.5  -DM management as below  -to trend bp's    Hemodynamics  -taking off vaso as elevated SVR point's to elevated afterload  -d/c'd beta blocker as above  -to monitor bp, if can tolerate to start on hydralizine 10mg o/n  -continue to monitor hemodynamics q4      EP  -another episode of SVT last night. Resolved with amio 150 and beta blocker  -now NSR  -started digoxin load  -EP consulted  -On tele  -Continue to monitor  -Keep K>4 and Mg>2  -replete electrolytes as needed    Pulmonary  -off bi-pap  -sating well on room air  -continue to trend SpO2    Endo  DM, hyperglycemia  -Hgb A1C 7.4  -started Lantus 25U at bedtime  -started ISS pre meal and at bedtime  -Started lispro 10U pre meal    Hypothyroidism  -On synthroid  -TSH 2.0  -T4, Serum: 6.8 ug/dL (06.12.19 @ 15:17)    Renal, CKD IV   -New baseline of 1.8-2.0 as per nephro  -Uptrending Cr, likely pre-renal d/t low CO/CI  -anticipating to improve with improving hemodynamics  -To trend renal function    GI  -abdominal pain  -likely 2/2 to bowel edema/ischemia 2/2 to low CO/CI  -KUB (-) for acute abdominal pathology  -anticipate improvement with improving hemodynamics    ID  -afebrile  -WBCs uptrending  -Feliciano Cx ordered  -Started vanc/zosyn  -Urine studies noted moderate bacteria, 1 WBC, (-) nitrites and leukocytes)  -to continue to look for localizing signs of infection      Heme  -anemia, chronic  -b/l appx 8-9  -to trend    PPx: HSQ  FEN: none; replete electrolytes PRN; NPO  Code status: Full      Dispo: c/w care on ICU A/P:    Miss Gamino is 74 yo woman with PMHx of HTN, T2DM, CAD s/p CABG (LIMA to LAD, SVG to OM, SVG to PDA 2014 at [facility]), non-dilated ICM (EF 20-25%), severe mitral regurgitation s/p mitral clip (6/13/19 Dr. Newman), severe pulm HTN, CKD, hypothyroidism, who is presenting to ED after experiencing worsening SOB and intermittent chest pain at [facility]. In ED, pt was placed on BiPAP and given lasix 40mg IVPx1.  Transferred to CCU for further management. Undergoing diuresis and having multiple episodes of SVT.    Cardiac  Acute decompensated heart failure/volume   -TTE 7/5 showed EF 15-20% with global LV systolic dysfunction  -lasix gtt 7.5  -Strict I/O, trend weights  -Cardiac enzymes plateau'd  -structural heart disease consulted    Cardioprotection  -ASA, brilinta, statin  -d/c'd lopressor 12.5  -DM management as below  -to trend bp's    Hemodynamics  -taking off vaso as elevated SVR point's to elevated afterload  -d/c'd beta blocker as above  -to monitor bp, if can tolerate to start on hydralizine 10mg o/n  -continue to monitor hemodynamics q4    EP  -another episode of SVT last night. Resolved with amio 150 and beta blocker  -now NSR  -started digoxin load  -EP consulted  -On tele  -Continue to monitor  -Keep K>4 and Mg>2  -replete electrolytes as needed    Pulmonary  -acute respiratory distress 2/2 to ADHF, resolved  -off bi-pap  -sating well on room air  -continue to trend SpO2    Endo  DM, hyperglycemia  -Hgb A1C 7.4  -started Lantus 25U at bedtime  -started ISS pre meal and at bedtime  -Started lispro 10U pre meal    Hypothyroidism  -On synthroid  -TSH 2.0  -T4, Serum: 6.8 ug/dL (06.12.19 @ 15:17)    Renal, CKD IV   -New baseline of 1.8-2.0 as per nephro  -Uptrending Cr, likely pre-renal d/t low CO/CI  -anticipating to improve with improving hemodynamics  -To trend renal function    GI  -abdominal pain  -likely 2/2 to bowel edema/ischemia 2/2 to low CO/CI  -KUB (-) for acute abdominal pathology  -anticipate improvement with improving hemodynamics    ID  -afebrile  -WBCs uptrending  -Feliciano Cx ordered  -Started vanc/zosyn  -Urine studies noted moderate bacteria, 1 WBC, (-) nitrites and leukocytes)  -to continue to look for localizing signs of infection      Heme  -anemia, chronic  -b/l appx 8-9  -to trend    PPx: HSQ  FEN: none; replete electrolytes PRN; NPO  Code status: Full      Dispo: c/w care on ICU

## 2019-07-09 NOTE — CONSULT NOTE ADULT - ASSESSMENT
Patient is a 73 year old female with past medical history of HTN, HLD, DMT2, pulmonary HTN, CKD, CAD s/p CABG in 2014, last stent Nov 2018, ICM, EF 20- 25%, severe mitral regurgitation s/p mitral clip on 6/13/19 with Dr. Valera and hypothyroidism presented to ED with c/o SOB and chest pain from [facility]. Currently admitted with acute decompensated heart failure of lasix and dobutamine drip. Overnight pt noted to have episode of SVT 's, lasting approximately 2 hours, treated with Amio 150mg IVP and lopressor 5mg IV x 1 then became bradycardiac with HR 40's. Started on digoxin load.  EP consulted for further management of SVT.      # SVT  # Decompensated HF  - ECHO: EF 21%, severe global left venticular systolic dysfunction,   - Tele: SR HR 60- 80's, EKG: SR, Overnight SVT episode 's  - Currently on lasix and dobutamine drip for HF   - Unable to start Amiodarone at this time due to  rising elevated LFT ( AST 1106, )  - Continue with close telemetry monitoring  - Pt may follow up as an outpatient for ICD implant for primary prevention with Dr. Miguel A Uriarte ([facility])     [phone]

## 2019-07-09 NOTE — PROGRESS NOTE ADULT - SUBJECTIVE AND OBJECTIVE BOX
[facility]   MD RAHEEL SHAH MD RUORU WONG, PA    TEL:  OFFICE: [phone]  DR SANTOYO CELL: [phone]  JUSTEN KENDALL CELL: [phone]  DR. NGUYEN CELL: [phone]    RENAL FOLLOW UP NOTE  --------------------------------------------------------------------------------  HPI:      Pt seen and examined at bedside.       PAST HISTORY  --------------------------------------------------------------------------------  No significant changes to PMH, PSH, FHx, SHx, unless otherwise noted    ALLERGIES & MEDICATIONS  --------------------------------------------------------------------------------  Allergies    azithromycin (Hives; Pruritus)    Intolerances      Standing Inpatient Medications  aspirin enteric coated 81 milliGRAM(s) Oral daily  atorvastatin 40 milliGRAM(s) Oral at bedtime  chlorhexidine 2% Cloths 1 Application(s) Topical daily  dextrose 5%. 1000 milliLiter(s) IV Continuous <Continuous>  dextrose 50% Injectable 12.5 Gram(s) IV Push once  dextrose 50% Injectable 25 Gram(s) IV Push once  dextrose 50% Injectable 25 Gram(s) IV Push once  digoxin     Tablet 0.25 milliGRAM(s) Oral daily  digoxin  Injectable 0.5 milliGRAM(s) IV Push once  DOBUTamine Infusion 2.5 MICROgram(s)/kG/Min IV Continuous <Continuous>  docusate sodium 100 milliGRAM(s) Oral two times a day  furosemide Infusion 7.5 mG/Hr IV Continuous <Continuous>  heparin  Injectable 5000 Unit(s) SubCutaneous every 8 hours  insulin glargine Injectable (LANTUS) 25 Unit(s) SubCutaneous at bedtime  insulin lispro (HumaLOG) corrective regimen sliding scale   SubCutaneous three times a day before meals  insulin lispro (HumaLOG) corrective regimen sliding scale   SubCutaneous at bedtime  insulin lispro Injectable (HumaLOG) 10 Unit(s) SubCutaneous three times a day before meals  levothyroxine 50 MICROGram(s) Oral daily  lidocaine   Patch 1 Patch Transdermal daily  melatonin 3 milliGRAM(s) Oral at bedtime  montelukast 10 milliGRAM(s) Oral daily  pantoprazole    Tablet 40 milliGRAM(s) Oral before breakfast  polyethylene glycol 3350 17 Gram(s) Oral daily  senna 2 Tablet(s) Oral at bedtime  ticagrelor 90 milliGRAM(s) Oral every 12 hours  vasopressin Infusion 0.08 Unit(s)/Min IV Continuous <Continuous>    PRN Inpatient Medications  acetaminophen   Tablet .. 650 milliGRAM(s) Oral every 6 hours PRN  dextrose 40% Gel 15 Gram(s) Oral once PRN  glucagon  Injectable 1 milliGRAM(s) IntraMuscular once PRN      REVIEW OF SYSTEMS  --------------------------------------------------------------------------------  General: no fever  MSK: no edema     VITALS/PHYSICAL EXAM  --------------------------------------------------------------------------------  T(C): 36.4 (07-09-19 @ 08:30), Max: 36.6 (07-09-19 @ 00:00)  HR: 68 (07-09-19 @ 10:45) (46 - 136)  BP: 115/57 (07-09-19 @ 10:45) (76/46 - 144/74)  RR: 18 (07-09-19 @ 10:45) (10 - 56)  SpO2: 93% (07-09-19 @ 10:45) (89% - 100%)  Wt(kg): --        07-08-19 @ 07:01  -  07-09-19 @ 07:00  --------------------------------------------------------  IN: 568.6 mL / OUT: 810 mL / NET: -241.4 mL    07-09-19 @ 07:01  -  07-09-19 @ 10:52  --------------------------------------------------------  IN: 33.8 mL / OUT: 240 mL / NET: -206.2 mL      Physical Exam:  	Gen: NAD  	HEENT: MMM  	Pulm: CTA B/L  	CV: S1S2  	Abd: Soft, +BS  	Ext: No LE edema B/L                      Neuro: Awake   	Skin: Warm and Dry   	JUAN FRANCISCO cuellar    LABS/STUDIES  --------------------------------------------------------------------------------              8.7    18.6  >-----------<  434      [07-09-19 @ 09:27]              28.0     134  |  94  |  59  ----------------------------<  178      [07-09-19 @ 03:50]  4.4   |  14  |  2.50        Ca     7.9     [07-09-19 @ 03:50]      Mg     2.1     [07-09-19 @ 03:50]      Phos  5.3     [07-08-19 @ 06:42]    TPro  7.2  /  Alb  3.5  /  TBili  0.5  /  DBili  x   /  AST  173  /  ALT  144  /  AlkPhos  158  [07-09-19 @ 03:50]        CK 36      [07-09-19 @ 03:50]    Creatinine Trend:  SCr 2.50 [07-09 @ 03:50]  SCr 2.33 [07-08 @ 06:42]  SCr 2.35 [07-07 @ 23:34]  SCr 2.29 [07-07 @ 15:11]  SCr 2.17 [07-07 @ 01:53]    Urinalysis - [06-14-19 @ 16:33]      Color Light Yellow / Appearance Clear / SG 1.016 / pH 5.5      Gluc 500 mg/dL / Ketone Negative  / Bili Negative / Urobili Negative       Blood Trace / Protein 30 mg/dL / Leuk Est Small / Nitrite Negative      RBC 10 / WBC 10 / Hyaline 2 / Gran  / Sq Epi  / Non Sq Epi 1 / Bacteria Negative    Urine Creatinine 39      [07-07-19 @ 05:09]  Urine Sodium 72      [07-07-19 @ 00:58]  Urine Urea Nitrogen 342      [07-07-19 @ 04:36]    Iron 42, TIBC 348, %sat 12      [07-05-19 @ 22:32]  Ferritin 130      [07-05-19 @ 22:32]  .4 (Ca --)      [04-25-19 @ 05:45]   --  PTH 43.55 (Ca --)      [09-10-18 @ 07:15]   --  PTH 36.60 (Ca --)      [09-08-18 @ 03:15]   --  Vitamin D (25OH) 25.9      [05-01-19 @ 04:00]  HbA1c 7.4      [07-05-19 @ 22:32]  TSH 2.00      [07-05-19 @ 22:32]  Lipid: chol 148, , HDL 35,       [06-01-19 @ 08:00]

## 2019-07-09 NOTE — PROCEDURE NOTE - NSPOSTPRCRAD_GEN_A_CORE
CXR pending, will review/line adjusted to depth of insertion/post-procedure radiography performed/depth of insertion

## 2019-07-10 LAB
ALBUMIN SERPL ELPH-MCNC: 3.5 G/DL — SIGNIFICANT CHANGE UP (ref 3.3–5)
ALBUMIN SERPL ELPH-MCNC: 3.9 G/DL — SIGNIFICANT CHANGE UP (ref 3.3–5)
ALP SERPL-CCNC: 145 U/L — HIGH (ref 40–120)
ALP SERPL-CCNC: 145 U/L — HIGH (ref 40–120)
ALP SERPL-CCNC: 146 U/L — HIGH (ref 40–120)
ALP SERPL-CCNC: 156 U/L — HIGH (ref 40–120)
ALT FLD-CCNC: 762 U/L — HIGH (ref 10–45)
ALT FLD-CCNC: 778 U/L — HIGH (ref 10–45)
ALT FLD-CCNC: 790 U/L — HIGH (ref 10–45)
ALT FLD-CCNC: 790 U/L — HIGH (ref 10–45)
ANION GAP SERPL CALC-SCNC: 15 MMOL/L — SIGNIFICANT CHANGE UP (ref 5–17)
ANION GAP SERPL CALC-SCNC: 16 MMOL/L — SIGNIFICANT CHANGE UP (ref 5–17)
ANION GAP SERPL CALC-SCNC: 17 MMOL/L — SIGNIFICANT CHANGE UP (ref 5–17)
ANION GAP SERPL CALC-SCNC: 18 MMOL/L — HIGH (ref 5–17)
AST SERPL-CCNC: 596 U/L — HIGH (ref 10–40)
AST SERPL-CCNC: 669 U/L — HIGH (ref 10–40)
AST SERPL-CCNC: 688 U/L — HIGH (ref 10–40)
AST SERPL-CCNC: 863 U/L — HIGH (ref 10–40)
BASE EXCESS BLDMV CALC-SCNC: -1.3 MMOL/L — SIGNIFICANT CHANGE UP (ref -3–3)
BASE EXCESS BLDMV CALC-SCNC: 0 MMOL/L — SIGNIFICANT CHANGE UP (ref -3–3)
BASE EXCESS BLDMV CALC-SCNC: 0.1 MMOL/L — SIGNIFICANT CHANGE UP (ref -3–3)
BASOPHILS # BLD AUTO: 0 K/UL — SIGNIFICANT CHANGE UP (ref 0–0.2)
BILIRUB SERPL-MCNC: 0.4 MG/DL — SIGNIFICANT CHANGE UP (ref 0.2–1.2)
BILIRUB SERPL-MCNC: 0.5 MG/DL — SIGNIFICANT CHANGE UP (ref 0.2–1.2)
BILIRUB SERPL-MCNC: 0.5 MG/DL — SIGNIFICANT CHANGE UP (ref 0.2–1.2)
BILIRUB SERPL-MCNC: 0.6 MG/DL — SIGNIFICANT CHANGE UP (ref 0.2–1.2)
BUN SERPL-MCNC: 52 MG/DL — HIGH (ref 7–23)
BUN SERPL-MCNC: 53 MG/DL — HIGH (ref 7–23)
BUN SERPL-MCNC: 55 MG/DL — HIGH (ref 7–23)
BUN SERPL-MCNC: 62 MG/DL — HIGH (ref 7–23)
CALCIUM SERPL-MCNC: 7.7 MG/DL — LOW (ref 8.4–10.5)
CALCIUM SERPL-MCNC: 7.7 MG/DL — LOW (ref 8.4–10.5)
CALCIUM SERPL-MCNC: 7.8 MG/DL — LOW (ref 8.4–10.5)
CALCIUM SERPL-MCNC: 8.2 MG/DL — LOW (ref 8.4–10.5)
CHLORIDE SERPL-SCNC: 102 MMOL/L — SIGNIFICANT CHANGE UP (ref 96–108)
CHLORIDE SERPL-SCNC: 103 MMOL/L — SIGNIFICANT CHANGE UP (ref 96–108)
CHLORIDE SERPL-SCNC: 105 MMOL/L — SIGNIFICANT CHANGE UP (ref 96–108)
CHLORIDE SERPL-SCNC: 98 MMOL/L — SIGNIFICANT CHANGE UP (ref 96–108)
CO2 BLDMV-SCNC: 23 MMOL/L — SIGNIFICANT CHANGE UP (ref 21–29)
CO2 BLDMV-SCNC: 24 MMOL/L — SIGNIFICANT CHANGE UP (ref 21–29)
CO2 BLDMV-SCNC: 24 MMOL/L — SIGNIFICANT CHANGE UP (ref 21–29)
CO2 SERPL-SCNC: 20 MMOL/L — LOW (ref 22–31)
CO2 SERPL-SCNC: 20 MMOL/L — LOW (ref 22–31)
CO2 SERPL-SCNC: 21 MMOL/L — LOW (ref 22–31)
CO2 SERPL-SCNC: 21 MMOL/L — LOW (ref 22–31)
CREAT SERPL-MCNC: 2.18 MG/DL — HIGH (ref 0.5–1.3)
CREAT SERPL-MCNC: 2.24 MG/DL — HIGH (ref 0.5–1.3)
CREAT SERPL-MCNC: 2.24 MG/DL — HIGH (ref 0.5–1.3)
CREAT SERPL-MCNC: 2.5 MG/DL — HIGH (ref 0.5–1.3)
DIGOXIN SERPL-MCNC: 2.2 NG/ML — HIGH (ref 0.8–2)
EOSINOPHIL # BLD AUTO: 0.4 K/UL — SIGNIFICANT CHANGE UP (ref 0–0.5)
EOSINOPHIL NFR BLD AUTO: 3 % — SIGNIFICANT CHANGE UP (ref 0–6)
GAS PNL BLDMV: SIGNIFICANT CHANGE UP
GAS PNL BLDV: SIGNIFICANT CHANGE UP
GLUCOSE BLDC GLUCOMTR-MCNC: 154 MG/DL — HIGH (ref 70–99)
GLUCOSE BLDC GLUCOMTR-MCNC: 157 MG/DL — HIGH (ref 70–99)
GLUCOSE BLDC GLUCOMTR-MCNC: 206 MG/DL — HIGH (ref 70–99)
GLUCOSE SERPL-MCNC: 116 MG/DL — HIGH (ref 70–99)
GLUCOSE SERPL-MCNC: 124 MG/DL — HIGH (ref 70–99)
GLUCOSE SERPL-MCNC: 169 MG/DL — HIGH (ref 70–99)
GLUCOSE SERPL-MCNC: 180 MG/DL — HIGH (ref 70–99)
HCO3 BLDMV-SCNC: 22 MMOL/L — SIGNIFICANT CHANGE UP (ref 20–28)
HCO3 BLDMV-SCNC: 23 MMOL/L — SIGNIFICANT CHANGE UP (ref 20–28)
HCO3 BLDMV-SCNC: 23 MMOL/L — SIGNIFICANT CHANGE UP (ref 20–28)
HCT VFR BLD CALC: 25.7 % — LOW (ref 34.5–45)
HCT VFR BLD CALC: 26.4 % — LOW (ref 34.5–45)
HCT VFR BLD CALC: 26.6 % — LOW (ref 34.5–45)
HGB BLD-MCNC: 8.5 G/DL — LOW (ref 11.5–15.5)
HGB BLD-MCNC: 8.8 G/DL — LOW (ref 11.5–15.5)
HGB BLD-MCNC: 9 G/DL — LOW (ref 11.5–15.5)
HOROWITZ INDEX BLDMV+IHG-RTO: 21 — SIGNIFICANT CHANGE UP
HOROWITZ INDEX BLDMV+IHG-RTO: 21 — SIGNIFICANT CHANGE UP
LACTATE SERPL-SCNC: 1.3 MMOL/L — SIGNIFICANT CHANGE UP (ref 0.7–2)
LACTATE SERPL-SCNC: 1.5 MMOL/L — SIGNIFICANT CHANGE UP (ref 0.7–2)
LACTATE SERPL-SCNC: 1.7 MMOL/L — SIGNIFICANT CHANGE UP (ref 0.7–2)
LACTATE SERPL-SCNC: 2.2 MMOL/L — HIGH (ref 0.7–2)
LYMPHOCYTES # BLD AUTO: 1.8 K/UL — SIGNIFICANT CHANGE UP (ref 1–3.3)
LYMPHOCYTES # BLD AUTO: 8 % — LOW (ref 13–44)
MAGNESIUM SERPL-MCNC: 2.2 MG/DL — SIGNIFICANT CHANGE UP (ref 1.6–2.6)
MAGNESIUM SERPL-MCNC: 2.3 MG/DL — SIGNIFICANT CHANGE UP (ref 1.6–2.6)
MAGNESIUM SERPL-MCNC: 2.3 MG/DL — SIGNIFICANT CHANGE UP (ref 1.6–2.6)
MAGNESIUM SERPL-MCNC: 2.4 MG/DL — SIGNIFICANT CHANGE UP (ref 1.6–2.6)
MCHC RBC-ENTMCNC: 28.1 PG — SIGNIFICANT CHANGE UP (ref 27–34)
MCHC RBC-ENTMCNC: 30 PG — SIGNIFICANT CHANGE UP (ref 27–34)
MCHC RBC-ENTMCNC: 30 PG — SIGNIFICANT CHANGE UP (ref 27–34)
MCHC RBC-ENTMCNC: 32.1 GM/DL — SIGNIFICANT CHANGE UP (ref 32–36)
MCHC RBC-ENTMCNC: 33.8 GM/DL — SIGNIFICANT CHANGE UP (ref 32–36)
MCHC RBC-ENTMCNC: 34.1 GM/DL — SIGNIFICANT CHANGE UP (ref 32–36)
MCV RBC AUTO: 87.4 FL — SIGNIFICANT CHANGE UP (ref 80–100)
MCV RBC AUTO: 88.1 FL — SIGNIFICANT CHANGE UP (ref 80–100)
MCV RBC AUTO: 88.7 FL — SIGNIFICANT CHANGE UP (ref 80–100)
MONOCYTES # BLD AUTO: 0.6 K/UL — SIGNIFICANT CHANGE UP (ref 0–0.9)
MONOCYTES NFR BLD AUTO: 1 % — LOW (ref 2–14)
NEUTROPHILS # BLD AUTO: 13.7 K/UL — HIGH (ref 1.8–7.4)
NEUTROPHILS NFR BLD AUTO: 88 % — HIGH (ref 43–77)
O2 CT VFR BLD CALC: 35 MMHG — SIGNIFICANT CHANGE UP (ref 30–65)
O2 CT VFR BLD CALC: 41 MMHG — SIGNIFICANT CHANGE UP (ref 30–65)
O2 CT VFR BLD CALC: 41 MMHG — SIGNIFICANT CHANGE UP (ref 30–65)
PCO2 BLDMV: 30 MMHG — SIGNIFICANT CHANGE UP (ref 30–65)
PCO2 BLDMV: 30 MMHG — SIGNIFICANT CHANGE UP (ref 30–65)
PCO2 BLDMV: 34 MMHG — SIGNIFICANT CHANGE UP (ref 30–65)
PH BLDMV: 7.43 — SIGNIFICANT CHANGE UP (ref 7.32–7.45)
PH BLDMV: 7.48 — HIGH (ref 7.32–7.45)
PH BLDMV: 7.49 — HIGH (ref 7.32–7.45)
PHOSPHATE SERPL-MCNC: 1.5 MG/DL — LOW (ref 2.5–4.5)
PHOSPHATE SERPL-MCNC: 1.9 MG/DL — LOW (ref 2.5–4.5)
PHOSPHATE SERPL-MCNC: 2.1 MG/DL — LOW (ref 2.5–4.5)
PHOSPHATE SERPL-MCNC: 2.8 MG/DL — SIGNIFICANT CHANGE UP (ref 2.5–4.5)
PLATELET # BLD AUTO: 362 K/UL — SIGNIFICANT CHANGE UP (ref 150–400)
PLATELET # BLD AUTO: 376 K/UL — SIGNIFICANT CHANGE UP (ref 150–400)
PLATELET # BLD AUTO: 418 K/UL — HIGH (ref 150–400)
POTASSIUM SERPL-MCNC: 3.7 MMOL/L — SIGNIFICANT CHANGE UP (ref 3.5–5.3)
POTASSIUM SERPL-MCNC: 3.9 MMOL/L — SIGNIFICANT CHANGE UP (ref 3.5–5.3)
POTASSIUM SERPL-MCNC: 4.1 MMOL/L — SIGNIFICANT CHANGE UP (ref 3.5–5.3)
POTASSIUM SERPL-MCNC: 4.3 MMOL/L — SIGNIFICANT CHANGE UP (ref 3.5–5.3)
POTASSIUM SERPL-SCNC: 3.7 MMOL/L — SIGNIFICANT CHANGE UP (ref 3.5–5.3)
POTASSIUM SERPL-SCNC: 3.9 MMOL/L — SIGNIFICANT CHANGE UP (ref 3.5–5.3)
POTASSIUM SERPL-SCNC: 4.1 MMOL/L — SIGNIFICANT CHANGE UP (ref 3.5–5.3)
POTASSIUM SERPL-SCNC: 4.3 MMOL/L — SIGNIFICANT CHANGE UP (ref 3.5–5.3)
PROT SERPL-MCNC: 7.1 G/DL — SIGNIFICANT CHANGE UP (ref 6–8.3)
PROT SERPL-MCNC: 7.2 G/DL — SIGNIFICANT CHANGE UP (ref 6–8.3)
PROT SERPL-MCNC: 7.2 G/DL — SIGNIFICANT CHANGE UP (ref 6–8.3)
PROT SERPL-MCNC: 7.8 G/DL — SIGNIFICANT CHANGE UP (ref 6–8.3)
RBC # BLD: 2.91 M/UL — LOW (ref 3.8–5.2)
RBC # BLD: 2.99 M/UL — LOW (ref 3.8–5.2)
RBC # BLD: 3.02 M/UL — LOW (ref 3.8–5.2)
RBC # FLD: 18 % — HIGH (ref 10.3–14.5)
RBC # FLD: 18 % — HIGH (ref 10.3–14.5)
RBC # FLD: 18.1 % — HIGH (ref 10.3–14.5)
SAO2 % BLDMV: 62 % — SIGNIFICANT CHANGE UP (ref 60–90)
SAO2 % BLDMV: 68 % — SIGNIFICANT CHANGE UP (ref 60–90)
SAO2 % BLDMV: 73 % — SIGNIFICANT CHANGE UP (ref 60–90)
SODIUM SERPL-SCNC: 137 MMOL/L — SIGNIFICANT CHANGE UP (ref 135–145)
SODIUM SERPL-SCNC: 138 MMOL/L — SIGNIFICANT CHANGE UP (ref 135–145)
SODIUM SERPL-SCNC: 140 MMOL/L — SIGNIFICANT CHANGE UP (ref 135–145)
SODIUM SERPL-SCNC: 141 MMOL/L — SIGNIFICANT CHANGE UP (ref 135–145)
WBC # BLD: 14.6 K/UL — HIGH (ref 3.8–10.5)
WBC # BLD: 15 K/UL — HIGH (ref 3.8–10.5)
WBC # BLD: 16.5 K/UL — HIGH (ref 3.8–10.5)
WBC # FLD AUTO: 14.6 K/UL — HIGH (ref 3.8–10.5)
WBC # FLD AUTO: 15 K/UL — HIGH (ref 3.8–10.5)
WBC # FLD AUTO: 16.5 K/UL — HIGH (ref 3.8–10.5)

## 2019-07-10 PROCEDURE — 71045 X-RAY EXAM CHEST 1 VIEW: CPT | Mod: 26

## 2019-07-10 PROCEDURE — 93010 ELECTROCARDIOGRAM REPORT: CPT

## 2019-07-10 PROCEDURE — 93010 ELECTROCARDIOGRAM REPORT: CPT | Mod: 77

## 2019-07-10 RX ORDER — ONDANSETRON 8 MG/1
4 TABLET, FILM COATED ORAL ONCE
Refills: 0 | Status: COMPLETED | OUTPATIENT
Start: 2019-07-10 | End: 2019-07-10

## 2019-07-10 RX ORDER — MEROPENEM 1 G/30ML
500 INJECTION INTRAVENOUS EVERY 12 HOURS
Refills: 0 | Status: DISCONTINUED | OUTPATIENT
Start: 2019-07-10 | End: 2019-07-11

## 2019-07-10 RX ORDER — HYDRALAZINE HCL 50 MG
20 TABLET ORAL EVERY 8 HOURS
Refills: 0 | Status: DISCONTINUED | OUTPATIENT
Start: 2019-07-10 | End: 2019-07-11

## 2019-07-10 RX ORDER — POTASSIUM PHOSPHATE, MONOBASIC POTASSIUM PHOSPHATE, DIBASIC 236; 224 MG/ML; MG/ML
15 INJECTION, SOLUTION INTRAVENOUS ONCE
Refills: 0 | Status: COMPLETED | OUTPATIENT
Start: 2019-07-10 | End: 2019-07-10

## 2019-07-10 RX ORDER — SODIUM,POTASSIUM PHOSPHATES 278-250MG
1 POWDER IN PACKET (EA) ORAL
Refills: 0 | Status: DISCONTINUED | OUTPATIENT
Start: 2019-07-10 | End: 2019-07-11

## 2019-07-10 RX ORDER — HYDRALAZINE HCL 50 MG
10 TABLET ORAL EVERY 8 HOURS
Refills: 0 | Status: DISCONTINUED | OUTPATIENT
Start: 2019-07-10 | End: 2019-07-10

## 2019-07-10 RX ORDER — POTASSIUM CHLORIDE 20 MEQ
40 PACKET (EA) ORAL ONCE
Refills: 0 | Status: COMPLETED | OUTPATIENT
Start: 2019-07-10 | End: 2019-07-10

## 2019-07-10 RX ORDER — POTASSIUM CHLORIDE 20 MEQ
40 PACKET (EA) ORAL ONCE
Refills: 0 | Status: DISCONTINUED | OUTPATIENT
Start: 2019-07-10 | End: 2019-07-10

## 2019-07-10 RX ORDER — POTASSIUM CHLORIDE 20 MEQ
20 PACKET (EA) ORAL ONCE
Refills: 0 | Status: COMPLETED | OUTPATIENT
Start: 2019-07-10 | End: 2019-07-10

## 2019-07-10 RX ADMIN — Medication 100 MILLIGRAM(S): at 05:07

## 2019-07-10 RX ADMIN — HEPARIN SODIUM 5000 UNIT(S): 5000 INJECTION INTRAVENOUS; SUBCUTANEOUS at 13:12

## 2019-07-10 RX ADMIN — SENNA PLUS 2 TABLET(S): 8.6 TABLET ORAL at 21:49

## 2019-07-10 RX ADMIN — Medication 81 MILLIGRAM(S): at 11:58

## 2019-07-10 RX ADMIN — TICAGRELOR 90 MILLIGRAM(S): 90 TABLET ORAL at 05:07

## 2019-07-10 RX ADMIN — HEPARIN SODIUM 5000 UNIT(S): 5000 INJECTION INTRAVENOUS; SUBCUTANEOUS at 21:49

## 2019-07-10 RX ADMIN — LIDOCAINE 1 PATCH: 4 CREAM TOPICAL at 21:50

## 2019-07-10 RX ADMIN — Medication 3 MILLIGRAM(S): at 21:49

## 2019-07-10 RX ADMIN — Medication 40 MILLIEQUIVALENT(S): at 14:50

## 2019-07-10 RX ADMIN — MEROPENEM 100 MILLIGRAM(S): 1 INJECTION INTRAVENOUS at 09:41

## 2019-07-10 RX ADMIN — ATORVASTATIN CALCIUM 40 MILLIGRAM(S): 80 TABLET, FILM COATED ORAL at 21:49

## 2019-07-10 RX ADMIN — Medication 20 MILLIEQUIVALENT(S): at 11:48

## 2019-07-10 RX ADMIN — PANTOPRAZOLE SODIUM 40 MILLIGRAM(S): 20 TABLET, DELAYED RELEASE ORAL at 05:07

## 2019-07-10 RX ADMIN — Medication 2: at 07:58

## 2019-07-10 RX ADMIN — Medication 11 UNIT(S): at 11:57

## 2019-07-10 RX ADMIN — TICAGRELOR 90 MILLIGRAM(S): 90 TABLET ORAL at 17:10

## 2019-07-10 RX ADMIN — PIPERACILLIN AND TAZOBACTAM 25 GRAM(S): 4; .5 INJECTION, POWDER, LYOPHILIZED, FOR SOLUTION INTRAVENOUS at 05:06

## 2019-07-10 RX ADMIN — MONTELUKAST 10 MILLIGRAM(S): 4 TABLET, CHEWABLE ORAL at 11:48

## 2019-07-10 RX ADMIN — INSULIN GLARGINE 28 UNIT(S): 100 INJECTION, SOLUTION SUBCUTANEOUS at 21:50

## 2019-07-10 RX ADMIN — Medication 250 MILLIGRAM(S): at 05:06

## 2019-07-10 RX ADMIN — Medication 8.31 MICROGRAM(S)/KG/MIN: at 07:56

## 2019-07-10 RX ADMIN — Medication 250 MILLIGRAM(S): at 17:05

## 2019-07-10 RX ADMIN — Medication 11 UNIT(S): at 07:59

## 2019-07-10 RX ADMIN — Medication 11 UNIT(S): at 16:29

## 2019-07-10 RX ADMIN — Medication 10 MILLIGRAM(S): at 05:07

## 2019-07-10 RX ADMIN — POLYETHYLENE GLYCOL 3350 17 GRAM(S): 17 POWDER, FOR SOLUTION ORAL at 11:48

## 2019-07-10 RX ADMIN — HEPARIN SODIUM 5000 UNIT(S): 5000 INJECTION INTRAVENOUS; SUBCUTANEOUS at 05:07

## 2019-07-10 RX ADMIN — MEROPENEM 100 MILLIGRAM(S): 1 INJECTION INTRAVENOUS at 21:48

## 2019-07-10 RX ADMIN — Medication 20 MILLIGRAM(S): at 21:49

## 2019-07-10 RX ADMIN — Medication 50 MICROGRAM(S): at 05:07

## 2019-07-10 RX ADMIN — POTASSIUM PHOSPHATE, MONOBASIC POTASSIUM PHOSPHATE, DIBASIC 62.5 MILLIMOLE(S): 236; 224 INJECTION, SOLUTION INTRAVENOUS at 21:48

## 2019-07-10 RX ADMIN — Medication 1: at 11:56

## 2019-07-10 RX ADMIN — ONDANSETRON 4 MILLIGRAM(S): 8 TABLET, FILM COATED ORAL at 20:00

## 2019-07-10 RX ADMIN — Medication 5 MG/HR: at 14:50

## 2019-07-10 RX ADMIN — Medication 20 MILLIGRAM(S): at 13:17

## 2019-07-10 NOTE — PROGRESS NOTE ADULT - SUBJECTIVE AND OBJECTIVE BOX
PATIENT:  SELVIN CLAIRE  [id number]    CHIEF COMPLAINT:  Patient is a 73y old  Female who presents with a chief complaint of Hypoxia, SOB (10 Jul 2019 00:19)    Miss Claire is 72 yo woman with PMHx of HTN, T2DM, CAD s/p CABG (LIMA to LAD, SVG to OM, SVG to PDA  at [facility]), non-dilated ICM (EF 20-25%), severe mitral regurgitation s/p mitral clip (19 Dr. Newman), severe pulm HTN, CKD, hypothyroidism, who is presenting to ED after experiencing worsening SOB and intermittent chest pain at [facility]. In ED, pt was placed on BiPAP and given lasix 40mg IVPx1.  Transferred to CCU for further management. Undergoing diuresis    INTERVAL HISTORYOVERNIGHT EVENTS:   Pt has SVT w/ 's overnight associated w/ STD on EKG on lactate of 4; s/p 1 dose of 150 amio w/ resolution of SVT.      REVIEW OF SYSTEMS:    Constitutional:     [x ] negative [ ] fevers [ ] chills [ ] weight loss [ ] weight gain  HEENT:                  [ x] negative [ ] dry eyes [ ] eye irritation [ ] postnasal drip [ ] nasal congestion  CV:                         [ x] negative  [ ] chest pain [ ] orthopnea [ ] palpitations [ ] murmur  Resp:                     [x ] negative [ ] cough [ ] shortness of breath [ ] dyspnea [ ] wheezing [ ] sputum [ ] hemoptysis  GI:                          [x ] negative [ ] nausea [ ] vomiting [ ] diarrhea [ ] constipation [ ] abd pain [ ] dysphagia   :                        [ x] negative [ ] dysuria [ ] nocturia [ ] hematuria [ ] increased urinary frequency  Musculoskeletal: [ x] negative [ ] back pain [ ] myalgias [ ] arthralgias [ ] fracture  Skin:                       [ x] negative [ ] rash [ ] itch  Neurological:        [x ] negative [ ] headache [ ] dizziness [ ] syncope [ ] weakness [ ] numbness      MEDICATIONS:  MEDICATIONS  (STANDING):  aspirin enteric coated 81 milliGRAM(s) Oral daily  atorvastatin 40 milliGRAM(s) Oral at bedtime  chlorhexidine 2% Cloths 1 Application(s) Topical daily  dextrose 5%. 1000 milliLiter(s) (50 mL/Hr) IV Continuous <Continuous>  dextrose 50% Injectable 12.5 Gram(s) IV Push once  dextrose 50% Injectable 25 Gram(s) IV Push once  dextrose 50% Injectable 25 Gram(s) IV Push once  digoxin     Tablet 0.125 milliGRAM(s) Oral daily  DOBUTamine Infusion 5 MICROgram(s)/kG/Min (8.31 mL/Hr) IV Continuous <Continuous>  docusate sodium 100 milliGRAM(s) Oral two times a day  furosemide Infusion 10 mG/Hr (5 mL/Hr) IV Continuous <Continuous>  heparin  Injectable 5000 Unit(s) SubCutaneous every 8 hours  hydrALAZINE 10 milliGRAM(s) Oral every 8 hours  insulin glargine Injectable (LANTUS) 28 Unit(s) SubCutaneous at bedtime  insulin lispro (HumaLOG) corrective regimen sliding scale   SubCutaneous three times a day before meals  insulin lispro (HumaLOG) corrective regimen sliding scale   SubCutaneous at bedtime  insulin lispro Injectable (HumaLOG) 11 Unit(s) SubCutaneous three times a day before meals  levothyroxine 50 MICROGram(s) Oral daily  lidocaine   Patch 1 Patch Transdermal daily  melatonin 3 milliGRAM(s) Oral at bedtime  montelukast 10 milliGRAM(s) Oral daily  pantoprazole    Tablet 40 milliGRAM(s) Oral before breakfast  piperacillin/tazobactam IVPB.. 3.375 Gram(s) IV Intermittent every 12 hours  polyethylene glycol 3350 17 Gram(s) Oral daily  potassium chloride    Tablet ER 40 milliEquivalent(s) Oral once  senna 2 Tablet(s) Oral at bedtime  ticagrelor 90 milliGRAM(s) Oral every 12 hours  vancomycin  IVPB 750 milliGRAM(s) IV Intermittent every 12 hours  vancomycin  IVPB        MEDICATIONS  (PRN):  acetaminophen   Tablet .. 650 milliGRAM(s) Oral every 6 hours PRN Mild Pain (1 - 3), Moderate Pain (4 - 6)  dextrose 40% Gel 15 Gram(s) Oral once PRN Blood Glucose LESS THAN 70 milliGRAM(s)/deciliter  glucagon  Injectable 1 milliGRAM(s) IntraMuscular once PRN Glucose LESS THAN 70 milligrams/deciliter      ALLERGIES:  Allergies    azithromycin (Hives; Pruritus)    Intolerances        OBJECTIVE:  ICU Vital Signs Last 24 Hrs  T(C): 36.8 (10 Jul 2019 06:00), Max: 37 (10 Jul 2019 04:00)  T(F): 98.2 (10 Jul 2019 06:00), Max: 98.6 (10 Jul 2019 04:00)  HR: 86 (10 Jul 2019 06:00) (48 - 92)  BP: 110/48 (10 Jul 2019 06:00) (86/46 - 144/74)  BP(mean): 77 (10 Jul 2019 06:00) (59 - 115)  ABP: --  ABP(mean): --  RR: 23 (10 Jul 2019 06:00) (16 - 33)  SpO2: 96% (10 Jul 2019 06:00) (93% - 100%)      Adult Advanced Hemodynamics Last 24 Hrs  CVP(mm Hg): 6 (10 Jul 2019 05:00) (4 - 20)  CVP(cm H2O): --  CO: 4.4 (2019 14:30) (3.9 - 4.4)  CI: 3.2 (2019 14:30) (2.8 - 3.2)  PA: 58/19 (10 Jul 2019 06:00) (58/19 - 91/41)  PA(mean): 31 (10 Jul 2019 06:00) (27 - 61)  PCWP: --  SVR: 926 (2019 14:30) (926 - 1618)  SVRI: 1273 (2019 14:30) (1273 - 2254)  PVR: --  PVRI: --  CAPILLARY BLOOD GLUCOSE      POCT Blood Glucose.: 190 mg/dL (2019 22:40)  POCT Blood Glucose.: 317 mg/dL (2019 17:02)  POCT Blood Glucose.: 207 mg/dL (2019 12:16)  POCT Blood Glucose.: 154 mg/dL (2019 08:22)    CAPILLARY BLOOD GLUCOSE      POCT Blood Glucose.: 190 mg/dL (2019 22:40)    I&O's Summary    2019 07:01  -  2019 07:00  --------------------------------------------------------  IN: 568.6 mL / OUT: 810 mL / NET: -241.4 mL    2019 07:01  -  10 Jul 2019 06:43  --------------------------------------------------------  IN: 1515.9 mL / OUT: 2880 mL / NET: -1364.1 mL      Daily     Daily Weight in k.3 (2019 07:15)    PHYSICAL EXAMINATION:  General: WN/WD NAD  HEENT: PERRLA, EOMI, moist mucous membranes  Neurology: A&Ox3, nonfocal, CUADRA x 4  Respiratory: CTA B/L, normal respiratory effort, no wheezes, crackles, rales  CV: RRR, S1S2, no murmurs, rubs or gallops  Abdominal: Soft, NT, ND +BS, Last BM  Extremities: No edema, + peripheral pulses  Incisions:   Tubes:    LABS:                          8.5    16.5  )-----------( 418      ( 10 Jul 2019 04:52 )             26.4     07-10    138  |  102  |  62<H>  ----------------------------<  116<H>  3.9   |  21<L>  |  2.50<H>    Ca    7.7<L>      10 Jul 2019 04:52  Phos  2.8     0710  Mg     2.4     10    TPro  7.1  /  Alb  3.5  /  TBili  0.4  /  DBili  x   /  AST  863<H>  /  ALT  790<H>  /  AlkPhos  145<H>  0710    LIVER FUNCTIONS - ( 10 Jul 2019 04:52 )  Alb: 3.5 g/dL / Pro: 7.1 g/dL / ALK PHOS: 145 U/L / ALT: 790 U/L / AST: 863 U/L / GGT: x               CARDIAC MARKERS ( 2019 03:50 )  x     / x     / 36 U/L / x     / x      CARDIAC MARKERS ( 2019 01:29 )  x     / x     / 42 U/L / x     / 1.7 ng/mL      Urinalysis Basic - ( 2019 13:28 )    Color: Yellow / Appearance: Clear / S.012 / pH: x  Gluc: x / Ketone: Negative  / Bili: Negative / Urobili: Negative   Blood: x / Protein: Trace / Nitrite: Negative   Leuk Esterase: Negative / RBC: 1 /hpf / WBC 1 /HPF   Sq Epi: x / Non Sq Epi: 0 /hpf / Bacteria: Moderate        TELEMETRY:     EKG:     IMAGING: PATIENT:  SELVIN CLAIRE  [id number]    CHIEF COMPLAINT:  Patient is a 73y old  Female who presents with a chief complaint of Hypoxia, SOB (10 Jul 2019 00:19)    Miss Claire is 74 yo woman with PMHx of HTN, T2DM, CAD s/p CABG (LIMA to LAD, SVG to OM, SVG to PDA  at [facility]), non-dilated ICM (EF 20-25%), severe mitral regurgitation s/p mitral clip (19 Dr. Newman), severe pulm HTN, CKD, hypothyroidism, who is presenting to ED after experiencing worsening SOB and intermittent chest pain at [facility]. In ED, pt was placed on BiPAP and given lasix 40mg IVPx1.  Transferred to CCU for further management. Undergoing diuresis    INTERVAL HISTORYOVERNIGHT EVENTS:   Pt has SVT w/ 's overnight associated w/ STD on EKG on lactate of 4; s/p 1 dose of 150 amio w/ resolution of SVT.      REVIEW OF SYSTEMS:    Constitutional:     [x ] negative [ ] fevers [ ] chills [ ] weight loss [ ] weight gain  HEENT:                  [ x] negative [ ] dry eyes [ ] eye irritation [ ] postnasal drip [ ] nasal congestion  CV:                         [ x] negative  [ ] chest pain [ ] orthopnea [ ] palpitations [ ] murmur  Resp:                     [x ] negative [ ] cough [ ] shortness of breath [ ] dyspnea [ ] wheezing [ ] sputum [ ] hemoptysis  GI:                          [x ] negative [ ] nausea [ ] vomiting [ ] diarrhea [ ] constipation [ ] abd pain [ ] dysphagia   :                        [ x] negative [ ] dysuria [ ] nocturia [ ] hematuria [ ] increased urinary frequency  Musculoskeletal: [ x] negative [ ] back pain [ ] myalgias [ ] arthralgias [ ] fracture  Skin:                       [ x] negative [ ] rash [ ] itch  Neurological:        [x ] negative [ ] headache [ ] dizziness [ ] syncope [ ] weakness [ ] numbness      MEDICATIONS:  MEDICATIONS  (STANDING):  aspirin enteric coated 81 milliGRAM(s) Oral daily  atorvastatin 40 milliGRAM(s) Oral at bedtime  chlorhexidine 2% Cloths 1 Application(s) Topical daily  dextrose 5%. 1000 milliLiter(s) (50 mL/Hr) IV Continuous <Continuous>  dextrose 50% Injectable 12.5 Gram(s) IV Push once  dextrose 50% Injectable 25 Gram(s) IV Push once  dextrose 50% Injectable 25 Gram(s) IV Push once  digoxin     Tablet 0.125 milliGRAM(s) Oral daily  DOBUTamine Infusion 5 MICROgram(s)/kG/Min (8.31 mL/Hr) IV Continuous <Continuous>  docusate sodium 100 milliGRAM(s) Oral two times a day  furosemide Infusion 10 mG/Hr (5 mL/Hr) IV Continuous <Continuous>  heparin  Injectable 5000 Unit(s) SubCutaneous every 8 hours  hydrALAZINE 10 milliGRAM(s) Oral every 8 hours  insulin glargine Injectable (LANTUS) 28 Unit(s) SubCutaneous at bedtime  insulin lispro (HumaLOG) corrective regimen sliding scale   SubCutaneous three times a day before meals  insulin lispro (HumaLOG) corrective regimen sliding scale   SubCutaneous at bedtime  insulin lispro Injectable (HumaLOG) 11 Unit(s) SubCutaneous three times a day before meals  levothyroxine 50 MICROGram(s) Oral daily  lidocaine   Patch 1 Patch Transdermal daily  melatonin 3 milliGRAM(s) Oral at bedtime  montelukast 10 milliGRAM(s) Oral daily  pantoprazole    Tablet 40 milliGRAM(s) Oral before breakfast  piperacillin/tazobactam IVPB.. 3.375 Gram(s) IV Intermittent every 12 hours  polyethylene glycol 3350 17 Gram(s) Oral daily  potassium chloride    Tablet ER 40 milliEquivalent(s) Oral once  senna 2 Tablet(s) Oral at bedtime  ticagrelor 90 milliGRAM(s) Oral every 12 hours  vancomycin  IVPB 750 milliGRAM(s) IV Intermittent every 12 hours  vancomycin  IVPB        MEDICATIONS  (PRN):  acetaminophen   Tablet .. 650 milliGRAM(s) Oral every 6 hours PRN Mild Pain (1 - 3), Moderate Pain (4 - 6)  dextrose 40% Gel 15 Gram(s) Oral once PRN Blood Glucose LESS THAN 70 milliGRAM(s)/deciliter  glucagon  Injectable 1 milliGRAM(s) IntraMuscular once PRN Glucose LESS THAN 70 milligrams/deciliter      ALLERGIES:  Allergies    azithromycin (Hives; Pruritus)    Intolerances        OBJECTIVE:  ICU Vital Signs Last 24 Hrs  T(C): 36.8 (10 Jul 2019 06:00), Max: 37 (10 Jul 2019 04:00)  T(F): 98.2 (10 Jul 2019 06:00), Max: 98.6 (10 Jul 2019 04:00)  HR: 86 (10 Jul 2019 06:00) (48 - 92)  BP: 110/48 (10 Jul 2019 06:00) (86/46 - 144/74)  BP(mean): 77 (10 Jul 2019 06:00) (59 - 115)  ABP: --  ABP(mean): --  RR: 23 (10 Jul 2019 06:00) (16 - 33)  SpO2: 96% (10 Jul 2019 06:00) (93% - 100%)      Adult Advanced Hemodynamics Last 24 Hrs  CVP(mm Hg): 6 (10 Jul 2019 05:00) (4 - 20)  CVP(cm H2O): --  CO: 4.4 (2019 14:30) (3.9 - 4.4)  CI: 3.2 (2019 14:30) (2.8 - 3.2)  PA: 58/19 (10 Jul 2019 06:00) (58/19 - 91/41)  PA(mean): 31 (10 Jul 2019 06:00) (27 - 61)  PCWP: --  SVR: 926 (2019 14:30) (926 - 1618)  SVRI: 1273 (2019 14:30) (1273 - 2254)  PVR: --  PVRI: --  CAPILLARY BLOOD GLUCOSE      POCT Blood Glucose.: 190 mg/dL (2019 22:40)  POCT Blood Glucose.: 317 mg/dL (2019 17:02)  POCT Blood Glucose.: 207 mg/dL (2019 12:16)  POCT Blood Glucose.: 154 mg/dL (2019 08:22)    CAPILLARY BLOOD GLUCOSE      POCT Blood Glucose.: 190 mg/dL (2019 22:40)    I&O's Summary    2019 07:01  -  2019 07:00  --------------------------------------------------------  IN: 568.6 mL / OUT: 810 mL / NET: -241.4 mL    2019 07:01  -  10 Jul 2019 06:43  --------------------------------------------------------  IN: 1515.9 mL / OUT: 2880 mL / NET: -1364.1 mL      Daily     Daily Weight in k.3 (2019 07:15)    PHYSICAL EXAMINATION:  General: WN/WD NAD  HEENT: PERRLA, EOMI, moist mucous membranes  Neurology: A&Ox3, nonfocal, CUADRA x 4  Respiratory: crackles B/L  CV: RRR, S1S2, no murmurs, rubs or gallops  Abdominal: Soft, NT, ND +BS, Last BM  Extremities: No edema, + peripheral pulses  [place] in place    LABS:                          8.5    16.5  )-----------( 418      ( 10 Jul 2019 04:52 )             26.4     07-10    138  |  102  |  62<H>  ----------------------------<  116<H>  3.9   |  21<L>  |  2.50<H>    Ca    7.7<L>      10 Jul 2019 04:52  Phos  2.8     07-10  Mg     2.4     07-10    TPro  7.1  /  Alb  3.5  /  TBili  0.4  /  DBili  x   /  AST  863<H>  /  ALT  790<H>  /  AlkPhos  145<H>  07-10    LIVER FUNCTIONS - ( 10 Jul 2019 04:52 )  Alb: 3.5 g/dL / Pro: 7.1 g/dL / ALK PHOS: 145 U/L / ALT: 790 U/L / AST: 863 U/L / GGT: x               CARDIAC MARKERS ( 2019 03:50 )  x     / x     / 36 U/L / x     / x      CARDIAC MARKERS ( 2019 01:29 )  x     / x     / 42 U/L / x     / 1.7 ng/mL      Urinalysis Basic - ( 2019 13:28 )    Color: Yellow / Appearance: Clear / S.012 / pH: x  Gluc: x / Ketone: Negative  / Bili: Negative / Urobili: Negative   Blood: x / Protein: Trace / Nitrite: Negative   Leuk Esterase: Negative / RBC: 1 /hpf / WBC 1 /HPF   Sq Epi: x / Non Sq Epi: 0 /hpf / Bacteria: Moderate        TELEMETRY:     EKG:     IMAGING:

## 2019-07-10 NOTE — CONSULT NOTE ADULT - ASSESSMENT
Pt is a 72 yo woman with PMHx of HTN, T2DM, CAD s/p CABG (LIMA to LAD, SVG to OM, SVG to PDA 2014 at [facility]), non-dilated ICM (EF 20-25%), severe mitral regurgitation s/p mitral clip (6/13/19 Dr. Newman), severe pulm HTN, CKD, hypothyroidism, who is presenting to ED after [facility] at [facility]. Also complaining of intermittent chest pain. Of note, pt was recently discharged on 6/19 after having mitral clip procedure completed. Pt says that she has been experiencing SOB for about 1 week. However, she was never noted to be hypoxic in rehab until today. She was not able to provide much hx 2/2 SOB and being on bipap. Daughter at bedside reporting that pt was well immediately after discharge. However, she gradually developed worsening SOB. Better with rest initially. Nothing alleviating SOB now. It is constant throughout the day, made worse with exertion. No fevers, chills, nausea, vomiting, cough, sputum production, chest tightness, pressure, palpitations, LOC, syncope.    In the ED, pt afebrile, , /75, RR 22, O2 100% on 2L NC. She subsequently developed worsened work of breathing and was placed on bipap with improvement. She was given , lasix 40mg IVP x1. CXR significant for pulmonary edema. Anemia to 9.3/28.5. BUN/Cr 42/1.72 (54/2.54 on 6/19). BNP 6626. VBG 7.49/29/61/22. TTE pending. (05 Jul 2019 19:02)    Pt transferred to CCU for further diuresis and management of acute decompensated heart failure and acute respiratory distress.  Pt with improved cardio-pulmonary status.  Pt also c/o abd pain, thought to be due to  bowel edema/ischemia 2/2 to low CO/CI.  Pt with transminitis likely from hypopersion state.  KUB (-) for acute abdominal pathology.      Pt has been afebrile.  WBC was trending upwards.  Pt started on vanco/meropenem.  Cultures sent.  Pt with prior h/o ESBL E.coli (6/13) from urine cultures.   Cxr clear lungs.  UA from 7/9 (-) LE/nit.    ID consult called for further abx managment.           Leukocytosis:    - r/o infectious source.  Suspect reactive leukocytosis.  Pt without localizing symptoms.  Afebrile.  Cxr clear.  UA (-).  Pt with cuellar in place, urine cultures unavailable.      - if bcx (-), and pt clinically improving (no fever, WBC trending downwards, hemodynamically stable, no new localizing symptoms), recommend d/c all abx and observe off.      Will follow,    Joselin Roman  [phone]

## 2019-07-10 NOTE — PROGRESS NOTE ADULT - ATTENDING COMMENTS
Patient is seen and examined with fellow, NP and the CCU house-staff. I agree with the history, physical and the assessment and plan.  will c/w diuresis   will increase the hydralazine to optimize afterload reduction  c/w inotropic support  no SVT on the digoxin

## 2019-07-10 NOTE — PROGRESS NOTE ADULT - ASSESSMENT
A/P:    Miss Gamino is 72 yo woman with PMHx of HTN, T2DM, CAD s/p CABG (LIMA to LAD, SVG to OM, SVG to PDA 2014 at [facility]), non-dilated ICM (EF 20-25%), severe mitral regurgitation s/p mitral clip (6/13/19 Dr. Newman), severe pulm HTN, CKD, hypothyroidism, who is presenting to ED after experiencing worsening SOB and intermittent chest pain at [facility]. In ED, pt was placed on BiPAP and given lasix 40mg IVPx1.  Transferred to CCU for further management. Undergoing diuresis and having multiple episodes of SVT.    Cardiac  Acute decompensated heart failure/volume   -TTE 7/5 showed EF 15-20% with global LV systolic dysfunction  -lasix gtt 7.5  -Strict I/O, trend weights  -Cardiac enzymes plateau'd  -structural heart disease consulted    Cardioprotection  -ASA, brilinta, statin  -d/c'd lopressor 12.5  -DM management as below  -to trend bp's    Hemodynamics  -taking off vaso as elevated SVR point's to elevated afterload  -d/c'd beta blocker as above  -to monitor bp, if can tolerate to start on hydralizine 10mg o/n  -continue to monitor hemodynamics q4    EP  -now NSR  -started digoxin load  -EP consulted, no amiodarone 2/2 uptrending LFTs  -outpt followup for ICD implant for primary prevention  -On tele  -Continue to monitor  -Keep K>4 and Mg>2  -replete electrolytes as needed    Pulmonary  -acute respiratory distress 2/2 to ADHF, resolved  -off bi-pap  -sating well on room air  -continue to trend SpO2    Endo  DM, hyperglycemia  -Hgb A1C 7.4  -started Lantus 25U at bedtime  -started ISS pre meal and at bedtime  -Started lispro 10U pre meal    Hypothyroidism  -On synthroid  -TSH 2.0  -T4, Serum: 6.8 ug/dL (06.12.19 @ 15:17)    Renal, CKD IV   -New baseline of 1.8-2.0 as per nephro  -Uptrending Cr, likely pre-renal d/t low CO/CI  -anticipating to improve with improving hemodynamics  -To trend renal function    GI  -abdominal pain  -likely 2/2 to bowel edema/ischemia 2/2 to low CO/CI  -KUB (-) for acute abdominal pathology  -anticipate improvement with improving hemodynamics    ID  -afebrile  -WBCs uptrending  -Feliciano Cx ordered  -Started vanc/zosyn (day 2)  -Urine studies noted moderate bacteria, 1 WBC, (-) nitrites and leukocytes)  -to continue to look for localizing signs of infection    Heme  -anemia, chronic  -b/l appx 8-9  -to trend    PPx: HSQ  FEN: none; replete electrolytes PRN; NPO  Code status: Full      Dispo: c/w care on ICU A/P:    Miss Gamino is 72 yo woman with PMHx of HTN, T2DM, CAD s/p CABG (LIMA to LAD, SVG to OM, SVG to PDA 2014 at [facility]), non-dilated ICM (EF 20-25%), severe mitral regurgitation s/p mitral clip (6/13/19 Dr. Newman), severe pulm HTN, CKD, hypothyroidism, who is presenting to ED after experiencing worsening SOB and intermittent chest pain at [facility]. In ED, pt was placed on BiPAP and given lasix 40mg IVPx1.  Transferred to CCU for further management. Undergoing diuresis and having multiple episodes of SVT.    Cardiac  Acute decompensated heart failure/volume   -TTE 7/5 showed EF 15-20% with global LV systolic dysfunction  -lasix gtt 7.5  -Strict I/O, trend weights  -Cardiac enzymes plateau'd  -structural heart disease consulted    Cardioprotection  -ASA, brilinta, statin  -d/c'd lopressor 12.5  -DM management as below  -to trend bp's    Hemodynamics  -taking off vaso as elevated SVR point's to elevated afterload  -d/c'd beta blocker as above  -to monitor bp, if can tolerate to start on hydralizine 10mg o/n  -continue to monitor hemodynamics q4    EP  -now NSR  -started digoxin load  -EP consulted, no amiodarone 2/2 uptrending LFTs  -outpt followup for ICD implant for primary prevention  -On tele  -Continue to monitor  -Keep K>4 and Mg>2  -replete electrolytes as needed    Pulmonary  -acute respiratory distress 2/2 to ADHF, resolved  -off bi-pap  -sating well on room air  -continue to trend SpO2    Endo  DM, hyperglycemia  -Hgb A1C 7.4  -started Lantus 25U at bedtime  -started ISS pre meal and at bedtime  -Started lispro 10U pre meal    Hypothyroidism  -On synthroid  -TSH 2.0  -T4, Serum: 6.8 ug/dL (06.12.19 @ 15:17)    Renal, CKD IV   -New baseline of 1.8-2.0 as per nephro  -Uptrending Cr, likely pre-renal d/t low CO/CI  -anticipating to improve with improving hemodynamics  -To trend renal function    GI  -abdominal pain  -likely 2/2 to bowel edema/ischemia 2/2 to low CO/CI  -KUB (-) for acute abdominal pathology  -transaminitis, also likely 2/2 to low output state now downtrending  -anticipate improvement with improving hemodynamics  -to continue to trend    ID  -afebrile  -WBCs downtrending 18.6 -> 13.8  -Pan Cx ordered  -UCx grew ESBL, sensitive to penems, TMP-Sulfa, NFT  -Started vanc/zosyn (day 2)  -To alter abx regiment  -BCx isolated no organisms  -to continue to look for localizing signs of infection    Heme  -anemia, chronic  -b/l appx 8-9  -to trend    PPx: HSQ  FEN: none; replete electrolytes PRN; NPO  Code status: Full      Dispo: c/w care on ICU A/P:    Miss Gamino is 74 yo woman with PMHx of HTN, T2DM, CAD s/p CABG (LIMA to LAD, SVG to OM, SVG to PDA 2014 at [facility]), non-dilated ICM (EF 20-25%), severe mitral regurgitation s/p mitral clip (6/13/19 Dr. Newman), severe pulm HTN, CKD, hypothyroidism, who is presenting to ED after experiencing worsening SOB and intermittent chest pain at [facility]. In ED, pt was placed on BiPAP and given lasix 40mg IVPx1.  Transferred to CCU for further management. Undergoing diuresis, with improved hemodynamics    Cardiac  Acute decompensated heart failure/volume   -TTE 7/5 showed EF 15-20% with global LV systolic dysfunction  -lasix gtt 10  -Strict I/O, trend weights  -Cardiac enzymes plateau'd  -structural heart disease consulted    Cardioprotection  -ASA, brilinta, statin  -d/c'd lopressor 12.5  -DM management as below  -to trend bp's    Hemodynamics  -uptitrating hydralizine to lower SVR  -d'c;d vaso as elevated SVR point's to elevated afterload  -d/c'd beta blocker as above  -to monitor bp, if can tolerate to start on hydralizine 10mg o/n  -continue to monitor hemodynamics q4    EP  -now NSR  -started digoxin load  -EP consulted, no amiodarone 2/2 uptrending LFTs  -outpt followup for ICD implant for primary prevention  -On tele  -Continue to monitor  -Keep K>4 and Mg>2  -replete electrolytes as needed    Pulmonary  -acute respiratory distress 2/2 to ADHF, resolved  -off bi-pap  -sating well on room air  -continue to trend SpO2    Endo  DM, hyperglycemia  -Hgb A1C 7.4  -started Lantus 25U at bedtime  -started ISS pre meal and at bedtime  -Started lispro 10U pre meal    Hypothyroidism  -On synthroid  -TSH 2.0  -T4, Serum: 6.8 ug/dL (06.12.19 @ 15:17)    Renal, CKD IV   -New baseline of 1.8-2.0 as per nephro  -Uptrending Cr, likely pre-renal d/t low CO/CI  -anticipating to improve with improving hemodynamics  -To trend renal function    GI  -abdominal pain  -likely 2/2 to bowel edema/ischemia 2/2 to low CO/CI  -KUB (-) for acute abdominal pathology  -transaminitis, also likely 2/2 to low output state now downtrending  -anticipate improvement with improving hemodynamics  -to continue to trend    ID  -afebrile  -WBCs downtrending 18.6 -> 13.8  -Pan Cx ordered  -UCx grew ESBL, sensitive to penems, TMP-Sulfa, NFT  -Started vanc/zosyn (day 2)  -To alter abx regiment  -BCx isolated no organisms  -to continue to look for localizing signs of infection    Heme  -anemia, chronic  -b/l appx 8-9  -to trend    PPx: HSQ  FEN: none; replete electrolytes PRN; NPO  Code status: Full      Dispo: c/w care on ICU A/P:    Miss Gamino is 74 yo woman with PMHx of HTN, T2DM, CAD s/p CABG (LIMA to LAD, SVG to OM, SVG to PDA 2014 at [facility]), non-dilated ICM (EF 20-25%), severe mitral regurgitation s/p mitral clip (6/13/19 Dr. Newman), severe pulm HTN, CKD, hypothyroidism, who is presenting to ED after experiencing worsening SOB and intermittent chest pain at [facility]. In ED, pt was placed on BiPAP and given lasix 40mg IVPx1.  Transferred to CCU for further management. Undergoing diuresis, with improved hemodynamics    Cardiac  Acute decompensated heart failure/volume   -TTE 7/5 showed EF 15-20% with global LV systolic dysfunction  -lasix gtt 10  -Strict I/O, trend weights  -Cardiac enzymes plateau'd  -structural heart disease consulted    Cardioprotection  -ASA, brilinta, statin  -d/c'd lopressor 12.5  -DM management as below  -to trend bp's    Hemodynamics  -uptitrating hydralizine to 20 to lower SVR  -d'c;d vaso as elevated SVR point's to elevated afterload  -d/c'd beta blocker as above  -to monitor bp, if can tolerate to start on hydralizine 10mg o/n  -continue to monitor hemodynamics q4    EP  -now NSR  -started digoxin load  -EP consulted, no amiodarone 2/2 uptrending LFTs  -outpt followup for ICD implant for primary prevention  -On tele  -Continue to monitor  -Keep K>4 and Mg>2  -replete electrolytes as needed    Pulmonary  -acute respiratory distress 2/2 to ADHF, resolved  -off bi-pap  -sating well on room air  -continue to trend SpO2    Endo  DM, hyperglycemia  -Hgb A1C 7.4  -started Lantus 25U at bedtime  -started ISS pre meal and at bedtime  -Started lispro 10U pre meal    Hypothyroidism  -On synthroid  -TSH 2.0  -T4, Serum: 6.8 ug/dL (06.12.19 @ 15:17)    Renal, CKD IV   -New baseline of 1.8-2.0 as per nephro  -Uptrending Cr, likely pre-renal d/t low CO/CI  -anticipating to improve with improving hemodynamics  -To trend renal function    GI  -abdominal pain  -likely 2/2 to bowel edema/ischemia 2/2 to low CO/CI  -KUB (-) for acute abdominal pathology  -transaminitis, also likely 2/2 to low output state now downtrending  -anticipate improvement with improving hemodynamics  -to continue to trend    ID  -afebrile  -WBCs downtrending 18.6 -> 13.8  -Pan Cx ordered  -UCx grew ESBL, sensitive to penems, TMP-Sulfa, NFT  -Started vanc/zosyn (day 2)  -d/c'd zosyn as resistant  -started meropenem 500 BID  -BCx isolated no organisms  -ID consulted. To follow recs    Heme  -anemia, chronic  -b/l appx 8-9  -to trend    PPx: HSQ  FEN: none; replete electrolytes PRN; NPO  Code status: Full      Dispo: c/w care on ICU

## 2019-07-10 NOTE — CONSULT NOTE ADULT - SUBJECTIVE AND OBJECTIVE BOX
Patient is a 73y old  Female who presents with a chief complaint of Hypoxia, SOB (10 Jul 2019 11:52)      HPI:    Pt is a 72 yo woman with PMHx of HTN, T2DM, CAD s/p CABG (LIMA to LAD, SVG to OM, SVG to PDA  at [facility]), non-dilated ICM (EF 20-25%), severe mitral regurgitation s/p mitral clip (19 Dr. Newman), severe pulm HTN, CKD, hypothyroidism, who is presenting to ED [facility]. Also complaining of intermittent chest pain. Of note, pt was recently discharged on  after having mitral clip procedure completed. Pt says that she has been experiencing SOB for about 1 week. However, she was never noted to be hypoxic in rehab until today. She was not able to provide much hx 2/2 SOB and being on bipap. Daughter at bedside reporting that pt was well immediately after discharge. However, she gradually developed worsening SOB. Better with rest initially. Nothing alleviating SOB now. It is constant throughout the day, made worse with exertion. No fevers, chills, nausea, vomiting, cough, sputum production, chest tightness, pressure, palpitations, LOC, syncope.    In the ED, pt afebrile, , /75, RR 22, O2 100% on 2L NC. She subsequently developed worsened work of breathing and was placed on bipap with improvement. She was given , lasix 40mg IVP x1. CXR significant for pulmonary edema. Anemia to 9.3/28.5. BUN/Cr 42/1.72 (54/2.54 on ). BNP 6626. VBG 7.49/29/61/22. TTE pending. (2019 19:02)    Pt transferred to CCU for further diuresis and management of acute decompensated heart failure and acute respiratory distress.  Pt with improved cardio-pulmonary status.  Pt also c/o abd pain, thought to be due to  bowel edema/ischemia 2/2 to low CO/CI.  Pt with transminitis likely from hypopersion state.  KUB (-) for acute abdominal pathology.      Pt has been afebrile.  WBC was trending upwards.  Pt started on vanco/meropenem.  Cultures sent.  Pt with prior h/o ESBL E.coli () from urine cultures.   Cxr clear lungs.  UA from  (-) LE/nit.    ID consult called for further abx managment.         REVIEW OF SYSTEMS:    CONSTITUTIONAL: No fever, weight loss, or fatigue  EYES: No eye pain, visual disturbances, or discharge  ENMT:  No sore throat  NECK: No pain or stiffness  RESPIRATORY: No cough, wheezing, chills or hemoptysis; No shortness of breath  CARDIOVASCULAR: No chest pain, palpitations, dizziness, or leg swelling  GASTROINTESTINAL: No abdominal or epigastric pain. No nausea, vomiting, or hematemesis; No diarrhea or constipation. No melena or hematochezia.  GENITOURINARY: No dysuria, frequency, hematuria, or incontinence  NEUROLOGICAL: No headaches, memory loss, loss of strength, numbness, or tremors  SKIN: No itching, burning, rashes, or lesions   LYMPH NODES: No enlarged glands  MUSCULOSKELETAL: No joint pain or swelling; No muscle, back, or extremity pain      PAST MEDICAL & SURGICAL HISTORY:  GERD (gastroesophageal reflux disease)  CAD (coronary artery disease): s/p CABG  Hypothyroidism  Hyperlipidemia  Diabetes mellitus, type 2  S/P CABG (coronary artery bypass graft)      Allergies    azithromycin (Hives; Pruritus)    Intolerances        FAMILY HISTORY:  Family history of hypertension (Sibling): sister  Family history of diabetes mellitus (Sibling): brothers/sisters      SOCIAL HISTORY:  No smoking, ivdu, etoh      MEDICATIONS  (STANDING):  aspirin enteric coated 81 milliGRAM(s) Oral daily  atorvastatin 40 milliGRAM(s) Oral at bedtime  chlorhexidine 2% Cloths 1 Application(s) Topical daily  dextrose 5%. 1000 milliLiter(s) (50 mL/Hr) IV Continuous <Continuous>  dextrose 50% Injectable 12.5 Gram(s) IV Push once  dextrose 50% Injectable 25 Gram(s) IV Push once  dextrose 50% Injectable 25 Gram(s) IV Push once  digoxin     Tablet 0.125 milliGRAM(s) Oral daily  DOBUTamine Infusion 5 MICROgram(s)/kG/Min (8.31 mL/Hr) IV Continuous <Continuous>  docusate sodium 100 milliGRAM(s) Oral two times a day  furosemide Infusion 10 mG/Hr (5 mL/Hr) IV Continuous <Continuous>  heparin  Injectable 5000 Unit(s) SubCutaneous every 8 hours  hydrALAZINE 20 milliGRAM(s) Oral every 8 hours  insulin glargine Injectable (LANTUS) 28 Unit(s) SubCutaneous at bedtime  insulin lispro (HumaLOG) corrective regimen sliding scale   SubCutaneous three times a day before meals  insulin lispro (HumaLOG) corrective regimen sliding scale   SubCutaneous at bedtime  insulin lispro Injectable (HumaLOG) 11 Unit(s) SubCutaneous three times a day before meals  levothyroxine 50 MICROGram(s) Oral daily  lidocaine   Patch 1 Patch Transdermal daily  melatonin 3 milliGRAM(s) Oral at bedtime  meropenem  IVPB 500 milliGRAM(s) IV Intermittent every 12 hours  montelukast 10 milliGRAM(s) Oral daily  pantoprazole    Tablet 40 milliGRAM(s) Oral before breakfast  polyethylene glycol 3350 17 Gram(s) Oral daily  potassium chloride    Tablet ER 40 milliEquivalent(s) Oral once  senna 2 Tablet(s) Oral at bedtime  ticagrelor 90 milliGRAM(s) Oral every 12 hours  vancomycin  IVPB 750 milliGRAM(s) IV Intermittent every 12 hours  vancomycin  IVPB        MEDICATIONS  (PRN):  acetaminophen   Tablet .. 650 milliGRAM(s) Oral every 6 hours PRN Mild Pain (1 - 3), Moderate Pain (4 - 6)  dextrose 40% Gel 15 Gram(s) Oral once PRN Blood Glucose LESS THAN 70 milliGRAM(s)/deciliter  glucagon  Injectable 1 milliGRAM(s) IntraMuscular once PRN Glucose LESS THAN 70 milligrams/deciliter      Vital Signs Last 24 Hrs  T(C): 36.9 (10 Jul 2019 07:00), Max: 37 (10 Jul 2019 04:00)  T(F): 98.4 (10 Jul 2019 07:00), Max: 98.6 (10 Jul 2019 04:00)  HR: 88 (10 Jul 2019 13:00) (76 - 92)  BP: 103/51 (10 Jul 2019 13:00) (92/46 - 131/40)  BP(mean): 66 (10 Jul 2019 13:00) (59 - 110)  RR: 25 (10 Jul 2019 13:00) (18 - 28)  SpO2: 99% (10 Jul 2019 13:00) (94% - 99%)    PHYSICAL EXAM:    GENERAL: NAD, well-groomed  HEAD:  Atraumatic, Normocephalic  EYES: EOMI, PERRLA, conjunctiva and sclera clear  ENMT: No tonsillar erythema, exudates, or enlargement; Moist mucous membranes  NECK: Supple, No JVD  CHEST/LUNG: Clear to percussion bilaterally; No rales, rhonchi, wheezing, or rubs  HEART: Regular rate and rhythm; No murmurs, rubs, or gallops  ABDOMEN: Soft, Nontender, Nondistended; Bowel sounds present  EXTREMITIES:  2+ Peripheral Pulses, No clubbing, cyanosis, or edema  LYMPH: No lymphadenopathy noted  SKIN: No rashes or lesions    LABS:  CBC Full  -  ( 10 Jul 2019 12:50 )  WBC Count : 15.0 K/uL  RBC Count : 2.91 M/uL  Hemoglobin : 8.8 g/dL  Hematocrit : 25.7 %  Platelet Count - Automated : 362 K/uL  Mean Cell Volume : 88.1 fl  Mean Cell Hemoglobin : 30.0 pg  Mean Cell Hemoglobin Concentration : 34.1 gm/dL  Auto Neutrophil # : x  Auto Lymphocyte # : x  Auto Monocyte # : x  Auto Eosinophil # : x  Auto Basophil # : x  Auto Neutrophil % : x  Auto Lymphocyte % : x  Auto Monocyte % : x  Auto Eosinophil % : x  Auto Basophil % : x      07-10    141  |  105  |  53<H>  ----------------------------<  180<H>  3.7   |  20<L>  |  2.24<H>    Ca    7.8<L>      10 Jul 2019 12:50  Phos  2.1     07-10  Mg     2.3     07-10    TPro  7.2  /  Alb  3.5  /  TBili  0.5  /  DBili  x   /  AST  669<H>  /  ALT  790<H>  /  AlkPhos  145<H>  0710      LIVER FUNCTIONS - ( 10 Jul 2019 12:50 )  Alb: 3.5 g/dL / Pro: 7.2 g/dL / ALK PHOS: 145 U/L / ALT: 790 U/L / AST: 669 U/L / GGT: x                               MICROBIOLOGY:        Urinalysis Basic - ( 2019 13:28 )    Color: Yellow / Appearance: Clear / S.012 / pH: x  Gluc: x / Ketone: Negative  / Bili: Negative / Urobili: Negative   Blood: x / Protein: Trace / Nitrite: Negative   Leuk Esterase: Negative / RBC: 1 /hpf / WBC 1 /HPF   Sq Epi: x / Non Sq Epi: 0 /hpf / Bacteria: Moderate      Culture - Urine (19 @ 19:31)    -  Trimethoprim/Sulfamethoxazole: S <=2/38    -  Tobramycin: R >8    -  Piperacillin/Tazobactam: R >64    -  Tigecycline: S <=2    -  Nitrofurantoin: S <=32 Should not be used to treat pyelonephritis    -  Meropenem: S <=1    -  Ertapenem: S <=1    -  Gentamicin: S <=4    -  Imipenem: S <=1    -  Levofloxacin: R >4    -  Ciprofloxacin: R >2    -  Cefoxitin: R >16    -  Amikacin: S <=16    -  Cefepime: R >16    -  Cefazolin: R >16 For uncomplicated UTI with K. pneumoniae, E. coli, or P. mirablis: OMARI <=16 is sensitive and OMARI >=32 is resistant. This also predicts results for oral agents cefaclor, cefdinir, cefpodoxime, cefprozil, cefuroxime axetil, cephalexin and locarbef for uncomplicated UTI. Note that some isolates may be susceptible to these agents while testing resistant to cefazolin.    -  Aztreonam: R >16    -  Ampicillin: R >16 These ampicillin results predict results for amoxicillin    -  Ampicillin/Sulbactam: R >16/8    -  Ceftriaxone: R >32 Enterobacter, Citrobacter, and Serratia may develop resistance during prolonged therapy    Specimen Source: .Urine    Culture Results:   >100,000 CFU/ml Escherichia coli ESBL    Organism Identification: Escherichia coli ESBL    Organism: Escherichia coli ESBL    Method Type: OMARI              RADIOLOGY:        < from: Xray Chest 1 View- PORTABLE-Routine (07.10.19 @ 09:20) >  Impression:    The heart is enlarged. The lungs appear to be clear. A Har[place]-Angi catheter   is seen on the right and the tip is wedged in the right lower lobe   pulmonary artery. No pneumothorax. Status post sternotomy.    < end of copied text >        < from: Xray Chest 1 View- PORTABLE-Urgent (19 @ 07:24) >  IMPRESSION:     Right-sided IJ Har[place]en-Angi catheter with tip in the right main pulmonary   artery.  Median sternotomy.  Transcutaneous pacer pads overlies the bilateral hemithoraces, limiting   evaluation of the lung parenchyma.    Partially visualized lung fields demonstrate no patchy opacity in the   left lower lobe.  There is no pneumothorax.   Small left pleural effusion.  The cardiomediastinal silhouette cannot be adequately assessed on this   projection.    < end of copied text >

## 2019-07-10 NOTE — PROGRESS NOTE ADULT - SUBJECTIVE AND OBJECTIVE BOX
====================  CCU MIDNIGHT ROUNDS  ====================    SELVIN CLAIRE  [id number]  Patient is a 73y old  Female who presents with a chief complaint of Hypoxia, SOB (09 Jul 2019 17:50)      ====================  SUMMARY:  ====================    74 yo F w/ PMHx of HTN, IDDMT2, CAD s/p CABG (LIMA to LAD, SVG to OM, SVG to PDA 2014 at [facility]), non-dilated ICM (EF 20-25%), severe mitral regurgitation s/p mitral clip (6/13/19 Dr. Newman), severe pulm HTN & hypothyroidism admitted for acute decompensated HF initially on vaso now on dobutamine w/ Florence in place (lactate was uptrending) and Lasix drip.  CCU course c/b episodes of SVT (resolved w/ Lopressor however BP’s soft; s/p 1 dose of 150 amio overnight on 7/8; dig loaded on 7/9).  EP consulted and structural heart c/s on 7/9. Labs revealing for uptrending WBCs; pt was pan cx'd and started on vanc/zosyn..     ====================  NEW EVENTS:  ====================    Lactate trended to normal range on Dobutamine at 5mcg/kg/min.  UO goal -500 to -1L; currently neg > 500cc/24 hr w/ lasix drip at 10.  Labs revealing for transaminitis likely 2/2 hypoperfusion from SVT yesterday night; will continue to monitor.      ====================  VITALS (Last 12 hrs):  ====================    T(C): 36.8 (07-10-19 @ 00:00), Max: 36.9 (07-09-19 @ 20:00)  T(F): 98.2 (07-10-19 @ 00:00), Max: 98.4 (07-09-19 @ 20:00)  HR: 80 (07-10-19 @ 00:00) (76 - 92)  BP: 120/58 (07-10-19 @ 00:00) (92/46 - 131/40)  BP(mean): 90 (07-10-19 @ 00:00) (59 - 110)  ABP: --  ABP(mean): --  RR: 24 (07-10-19 @ 00:00) (18 - 28)  SpO2: 95% (07-10-19 @ 00:00) (93% - 98%)  Wt(kg): --  CVP(mm Hg): 7 (07-10-19 @ 00:00) (5 - 15)  CVP(cm H2O): --  CO: 4.4 (07-09-19 @ 14:30) (4.4 - 4.4)  CI: 3.2 (07-09-19 @ 14:30) (3.2 - 3.2)  PA: 68/20 (07-10-19 @ 00:00) (60/17 - 79/29)  PA(mean): 37 (07-10-19 @ 00:00) (31 - 48)  PCWP: --  SVR: 926 (07-09-19 @ 14:30) (926 - 926)  PVR: --    I&O's Summary    08 Jul 2019 07:01  -  09 Jul 2019 07:00  --------------------------------------------------------  IN: 568.6 mL / OUT: 810 mL / NET: -241.4 mL    09 Jul 2019 07:01  -  10 Jul 2019 00:19  --------------------------------------------------------  IN: 1036.1 mL / OUT: 1855 mL / NET: -818.9 mL            ====================  NEW LABS:  ====================                          8.7    18.6  )-----------( 434      ( 09 Jul 2019 09:27 )             28.0     07-09    135  |  96  |  65<H>  ----------------------------<  218<H>  4.2   |  18<L>  |  2.61<H>    Ca    7.6<L>      09 Jul 2019 21:58  Phos  5.7     07-09  Mg     1.9     07-09    TPro  7.0  /  Alb  3.5  /  TBili  0.4  /  DBili  x   /  AST  1076<H>  /  ALT  798<H>  /  AlkPhos  146<H>  07-09      Creatine Kinase, Serum: 36 U/L (07-09-19 @ 03:50)  Creatine Kinase, Serum: 42 U/L (07-09-19 @ 01:29)    CKMB Units: 1.7 ng/mL (07-09 @ 01:29)      Blood Gas Source, Mixed: Mixed Blood Gas (07-09-19 @ 21:46)  Blood Gas Source, Mixed: Mixed Blood Gas (07-09-19 @ 14:45)  Blood Gas Source, Mixed: Mixed Blood Gas (07-09-19 @ 09:21)  Blood Gas Source, Mixed: Mixed Blood Gas (07-09-19 @ 05:35)      ====================  PLAN:  ====================  # CV  * Acute Decompensated HF   - Pt w/ Florence in place; if CI improves will start on 10mg Hydralazine TID  - ====================  CCU MIDNIGHT ROUNDS  ====================    SELVIN CLAIRE  [id number]  Patient is a 73y old  Female who presents with a chief complaint of Hypoxia, SOB (09 Jul 2019 17:50)      ====================  SUMMARY:  ====================    74 yo F w/ PMHx of HTN, IDDMT2, CAD s/p CABG (LIMA to LAD, SVG to OM, SVG to PDA 2014 at [facility]), non-dilated ICM (EF 20-25%), severe mitral regurgitation s/p mitral clip (6/13/19 Dr. Newman), severe pulm HTN & hypothyroidism admitted for acute decompensated HF initially on vaso now on dobutamine w/ Ramsey in place (lactate was uptrending) and Lasix drip.  CCU course c/b episodes of SVT (resolved w/ Lopressor however BP’s soft; s/p 1 dose of 150 amio overnight on 7/8; dig loaded on 7/9).  EP consulted and structural heart c/s on 7/9. Labs revealing for uptrending WBCs; pt was pan cx'd and started on vanc/zosyn..     ====================  NEW EVENTS:  ====================    Ramsey numbers improved:  CO 3.9 --> 4.4  CI 2.8 --> 3.2  SVR 2254 --> 926  Mixed venous 60 -67    Dig loaded today for SVT  WBC trended upward; pan cultured and started on vanc/zosyn      ====================  VITALS (Last 12 hrs):  ====================    T(C): 36.8 (07-10-19 @ 00:00), Max: 36.9 (07-09-19 @ 20:00)  T(F): 98.2 (07-10-19 @ 00:00), Max: 98.4 (07-09-19 @ 20:00)  HR: 80 (07-10-19 @ 00:00) (76 - 92)  BP: 120/58 (07-10-19 @ 00:00) (92/46 - 131/40)  BP(mean): 90 (07-10-19 @ 00:00) (59 - 110)  ABP: --  ABP(mean): --  RR: 24 (07-10-19 @ 00:00) (18 - 28)  SpO2: 95% (07-10-19 @ 00:00) (93% - 98%)  Wt(kg): --  CVP(mm Hg): 7 (07-10-19 @ 00:00) (5 - 15)  CVP(cm H2O): --  CO: 4.4 (07-09-19 @ 14:30) (4.4 - 4.4)  CI: 3.2 (07-09-19 @ 14:30) (3.2 - 3.2)  PA: 68/20 (07-10-19 @ 00:00) (60/17 - 79/29)  PA(mean): 37 (07-10-19 @ 00:00) (31 - 48)  PCWP: --  SVR: 926 (07-09-19 @ 14:30) (926 - 926)  PVR: --    I&O's Summary    08 Jul 2019 07:01  -  09 Jul 2019 07:00  --------------------------------------------------------  IN: 568.6 mL / OUT: 810 mL / NET: -241.4 mL    09 Jul 2019 07:01  -  10 Jul 2019 00:19  --------------------------------------------------------  IN: 1036.1 mL / OUT: 1855 mL / NET: -818.9 mL            ====================  NEW LABS:  ====================                          8.7    18.6  )-----------( 434      ( 09 Jul 2019 09:27 )             28.0     07-09    135  |  96  |  65<H>  ----------------------------<  218<H>  4.2   |  18<L>  |  2.61<H>    Ca    7.6<L>      09 Jul 2019 21:58  Phos  5.7     07-09  Mg     1.9     07-09    TPro  7.0  /  Alb  3.5  /  TBili  0.4  /  DBili  x   /  AST  1076<H>  /  ALT  798<H>  /  AlkPhos  146<H>  07-09      Creatine Kinase, Serum: 36 U/L (07-09-19 @ 03:50)  Creatine Kinase, Serum: 42 U/L (07-09-19 @ 01:29)    CKMB Units: 1.7 ng/mL (07-09 @ 01:29)      Blood Gas Source, Mixed: Mixed Blood Gas (07-09-19 @ 21:46)  Blood Gas Source, Mixed: Mixed Blood Gas (07-09-19 @ 14:45)  Blood Gas Source, Mixed: Mixed Blood Gas (07-09-19 @ 09:21)  Blood Gas Source, Mixed: Mixed Blood Gas (07-09-19 @ 05:35)      ====================  PLAN:  ====================  # CV  * Acute Decompensated HF   - Pt w/ Ramsey in place; CO, CI and SVR improved; started on 10 hydralazine TID  - c/w Lasix at 10; pt at output goal (~ 900 cc) ====================  CCU MIDNIGHT ROUNDS  ====================    SELVIN CLAIRE  [id number]  Patient is a 73y old  Female who presents with a chief complaint of Hypoxia, SOB (09 Jul 2019 17:50)      ====================  SUMMARY:  ====================    74 yo F w/ PMHx of HTN, IDDMT2, CAD s/p CABG (LIMA to LAD, SVG to OM, SVG to PDA 2014 at [facility]), non-dilated ICM (EF 20-25%), severe mitral regurgitation s/p mitral clip (6/13/19 Dr. Newman), severe pulm HTN & hypothyroidism admitted for acute decompensated HF initially on vaso now on dobutamine w/ Burke in place (lactate was uptrending) and Lasix drip.  CCU course c/b episodes of SVT (resolved w/ Lopressor however BP’s soft; s/p 1 dose of 150 amio overnight on 7/8; dig loaded on 7/9).  EP consulted and structural heart c/s on 7/9. Labs revealing for uptrending WBCs; pt was pan cx'd and started on vanc/zosyn..     ====================  NEW EVENTS:  ====================    Burke numbers improved:  CO 3.9 --> 4.4  CI 2.8 --> 3.2  SVR 2254 --> 926  Mixed venous 60 -67    Dig loaded today for SVT  WBC trended upward; pan cultured and started on vanc/zosyn      ====================  VITALS (Last 12 hrs):  ====================    T(C): 36.8 (07-10-19 @ 00:00), Max: 36.9 (07-09-19 @ 20:00)  T(F): 98.2 (07-10-19 @ 00:00), Max: 98.4 (07-09-19 @ 20:00)  HR: 80 (07-10-19 @ 00:00) (76 - 92)  BP: 120/58 (07-10-19 @ 00:00) (92/46 - 131/40)  BP(mean): 90 (07-10-19 @ 00:00) (59 - 110)  ABP: --  ABP(mean): --  RR: 24 (07-10-19 @ 00:00) (18 - 28)  SpO2: 95% (07-10-19 @ 00:00) (93% - 98%)  Wt(kg): --  CVP(mm Hg): 7 (07-10-19 @ 00:00) (5 - 15)  CVP(cm H2O): --  CO: 4.4 (07-09-19 @ 14:30) (4.4 - 4.4)  CI: 3.2 (07-09-19 @ 14:30) (3.2 - 3.2)  PA: 68/20 (07-10-19 @ 00:00) (60/17 - 79/29)  PA(mean): 37 (07-10-19 @ 00:00) (31 - 48)  PCWP: --  SVR: 926 (07-09-19 @ 14:30) (926 - 926)  PVR: --    I&O's Summary    08 Jul 2019 07:01  -  09 Jul 2019 07:00  --------------------------------------------------------  IN: 568.6 mL / OUT: 810 mL / NET: -241.4 mL    09 Jul 2019 07:01  -  10 Jul 2019 00:19  --------------------------------------------------------  IN: 1036.1 mL / OUT: 1855 mL / NET: -818.9 mL            ====================  NEW LABS:  ====================                          8.7    18.6  )-----------( 434      ( 09 Jul 2019 09:27 )             28.0     07-09    135  |  96  |  65<H>  ----------------------------<  218<H>  4.2   |  18<L>  |  2.61<H>    Ca    7.6<L>      09 Jul 2019 21:58  Phos  5.7     07-09  Mg     1.9     07-09    TPro  7.0  /  Alb  3.5  /  TBili  0.4  /  DBili  x   /  AST  1076<H>  /  ALT  798<H>  /  AlkPhos  146<H>  07-09      Creatine Kinase, Serum: 36 U/L (07-09-19 @ 03:50)  Creatine Kinase, Serum: 42 U/L (07-09-19 @ 01:29)    CKMB Units: 1.7 ng/mL (07-09 @ 01:29)      Blood Gas Source, Mixed: Mixed Blood Gas (07-09-19 @ 21:46)  Blood Gas Source, Mixed: Mixed Blood Gas (07-09-19 @ 14:45)  Blood Gas Source, Mixed: Mixed Blood Gas (07-09-19 @ 09:21)  Blood Gas Source, Mixed: Mixed Blood Gas (07-09-19 @ 05:35)

## 2019-07-10 NOTE — PROGRESS NOTE ADULT - SUBJECTIVE AND OBJECTIVE BOX
INTERVAL HPI/OVERNIGHT EVENTS: Pt seen resting comfortably in bed    MEDICATIONS  (STANDING):  aspirin enteric coated 81 milliGRAM(s) Oral daily  atorvastatin 40 milliGRAM(s) Oral at bedtime  chlorhexidine 2% Cloths 1 Application(s) Topical daily  dextrose 5%. 1000 milliLiter(s) (50 mL/Hr) IV Continuous <Continuous>  dextrose 50% Injectable 12.5 Gram(s) IV Push once  dextrose 50% Injectable 25 Gram(s) IV Push once  dextrose 50% Injectable 25 Gram(s) IV Push once  digoxin     Tablet 0.125 milliGRAM(s) Oral daily  DOBUTamine Infusion 5 MICROgram(s)/kG/Min (8.31 mL/Hr) IV Continuous <Continuous>  docusate sodium 100 milliGRAM(s) Oral two times a day  furosemide Infusion 10 mG/Hr (5 mL/Hr) IV Continuous <Continuous>  heparin  Injectable 5000 Unit(s) SubCutaneous every 8 hours  hydrALAZINE 20 milliGRAM(s) Oral every 8 hours  insulin glargine Injectable (LANTUS) 28 Unit(s) SubCutaneous at bedtime  insulin lispro (HumaLOG) corrective regimen sliding scale   SubCutaneous three times a day before meals  insulin lispro (HumaLOG) corrective regimen sliding scale   SubCutaneous at bedtime  insulin lispro Injectable (HumaLOG) 11 Unit(s) SubCutaneous three times a day before meals  levothyroxine 50 MICROGram(s) Oral daily  lidocaine   Patch 1 Patch Transdermal daily  melatonin 3 milliGRAM(s) Oral at bedtime  meropenem  IVPB 500 milliGRAM(s) IV Intermittent every 12 hours  montelukast 10 milliGRAM(s) Oral daily  pantoprazole    Tablet 40 milliGRAM(s) Oral before breakfast  polyethylene glycol 3350 17 Gram(s) Oral daily  senna 2 Tablet(s) Oral at bedtime  ticagrelor 90 milliGRAM(s) Oral every 12 hours  vancomycin  IVPB 750 milliGRAM(s) IV Intermittent every 12 hours  vancomycin  IVPB        MEDICATIONS  (PRN):  acetaminophen   Tablet .. 650 milliGRAM(s) Oral every 6 hours PRN Mild Pain (1 - 3), Moderate Pain (4 - 6)  dextrose 40% Gel 15 Gram(s) Oral once PRN Blood Glucose LESS THAN 70 milliGRAM(s)/deciliter  glucagon  Injectable 1 milliGRAM(s) IntraMuscular once PRN Glucose LESS THAN 70 milligrams/deciliter      Allergies    azithromycin (Hives; Pruritus)    Intolerances      ROS:  General: Pt denies fever/chills    Cardiovascular: denies chest pain/palpitations/leg edema    Respiratory and Thorax: denies SOB/cough/wheezing    Gastrointestinal: denies abdominal pain/diarrhea/constipation/bloody stool    Musculoskeletal: denies joint pain or swelling, denies restricted motion    Skin: denies rashes/sores    Hematologic: denies abnormal bleeding    Vital Signs Last 24 Hrs  T(C): 36.9 (10 Jul 2019 07:00), Max: 37 (10 Jul 2019 04:00)  T(F): 98.4 (10 Jul 2019 07:00), Max: 98.6 (10 Jul 2019 04:00)  HR: 78 (10 Jul 2019 11:00) (68 - 92)  BP: 108/50 (10 Jul 2019 11:00) (92/46 - 131/40)  BP(mean): 81 (10 Jul 2019 11:00) (59 - 110)  RR: 24 (10 Jul 2019 11:00) (18 - 28)  SpO2: 98% (10 Jul 2019 11:00) (93% - 99%)    Physical Exam:  Constitutional: well developed, well nourished,  and no acute distress    Neurological: Alert & Oriented x 3,  no focal deficits    HEENT:   Neck supple.    Respiratory: CTA B/L, No wheezing/crackles/rhonchi    Cardiovascular: (+) S1 & S2, RRR    Gastrointestinal: soft, NT, nondistended, (+) BS    Genitourinary: non distended bladder, voiding freely    Extremities: No pedal edema, No clubbing, No cyanosis    Skin:  normal skin color and pigmentation, no skin lesions          LABS:                        8.5    16.5  )-----------( 418      ( 10 Jul 2019 04:52 )             26.4     07-10    138  |  102  |  62<H>  ----------------------------<  116<H>  3.9   |  21<L>  |  2.50<H>    Ca    7.7<L>      10 Jul 2019 04:52  Phos  2.8     07-10  Mg     2.4     07-10    TPro  7.1  /  Alb  3.5  /  TBili  0.4  /  DBili  x   /  AST  863<H>  /  ALT  790<H>  /  AlkPhos  145<H>  07-10      Urinalysis Basic - ( 2019 13:28 )    Color: Yellow / Appearance: Clear / S.012 / pH: x  Gluc: x / Ketone: Negative  / Bili: Negative / Urobili: Negative   Blood: x / Protein: Trace / Nitrite: Negative   Leuk Esterase: Negative / RBC: 1 /hpf / WBC 1 /HPF   Sq Epi: x / Non Sq Epi: 0 /hpf / Bacteria: Moderate        RADIOLOGY & ADDITIONAL TESTS:    TELE:    EKG:

## 2019-07-10 NOTE — PROGRESS NOTE ADULT - ASSESSMENT
Pt is a 74 yo woman with PMHx of HTN, T2DM, CAD s/p CABG (LIMA to LAD, SVG to OM, SVG to PDA 2014 at [facility]), non-dilated ICM (EF 20-25%), severe mitral regurgitation s/p mitral clip (6/13/19 Dr. Newman), severe pulm HTN, CKD, hypothyroidism, who is presenting to ED after experiencing worsening SOB      A/P:      MERVIN  MERVIN sec to cardiogenic shock  Renal function elevated however stable  PT non -oliguric  Continue ionotropic support per CCU  Monitor renal function   Avoid further nephrotoxics, NSAIDS RCA    CKD stage 4:  baseline Scr 1.8-2.0  Renal function fluctuates sec to CHF  Monitor renal function at present    SOB:  in setting of HF  Continue care per CCU    ACidosis   sec to RF  mOnitor serum Co2 at present

## 2019-07-10 NOTE — PROGRESS NOTE ADULT - SUBJECTIVE AND OBJECTIVE BOX
CHIEF COMPLAINT:    SUBJECTIVE:     REVIEW OF SYSTEMS:    CONSTITUTIONAL: (  )  weakness,  (  ) fevers or chills  EYES/ENT: (  )visual changes;     NECK: (  ) pain or stiffness  RESPIRATORY:   (  )cough, wheezing, hemoptysis;  (  ) shortness of breath  CARDIOVASCULAR:  (  )chest pain or palpitations  GASTROINTESTINAL:   (  )abdominal or epigastric pain.  (  ) nausea, vomiting, or hematemesis;   (   ) diarrhea or constipation.   GENITOURINARY:   (    ) dysuria, frequency or hematuria  NEUROLOGICAL:  (   ) numbness or weakness   All other review of systems is negative unless indicated above    Vital Signs Last 24 Hrs  T(C): 36.9 (10 Jul 2019 07:00), Max: 37 (10 Jul 2019 04:00)  T(F): 98.4 (10 Jul 2019 07:00), Max: 98.6 (10 Jul 2019 04:00)  HR: 84 (10 Jul 2019 19:00) (76 - 92)  BP: 131/56 (10 Jul 2019 19:00) (97/43 - 134/57)  BP(mean): 86 (10 Jul 2019 19:00) (60 - 110)  RR: 19 (10 Jul 2019 19:00) (18 - 28)  SpO2: 99% (10 Jul 2019 19:00) (94% - 100%)    I&O's Summary    09 Jul 2019 07:01  -  10 Jul 2019 07:00  --------------------------------------------------------  IN: 1554.2 mL / OUT: 3180 mL / NET: -1625.8 mL    10 Jul 2019 07:01  -  10 Jul 2019 19:40  --------------------------------------------------------  IN: 259.6 mL / OUT: 2550 mL / NET: -2290.4 mL        CAPILLARY BLOOD GLUCOSE      POCT Blood Glucose.: 157 mg/dL (10 Jul 2019 11:54)  POCT Blood Glucose.: 206 mg/dL (10 Jul 2019 07:54)  POCT Blood Glucose.: 190 mg/dL (09 Jul 2019 22:40)      PHYSICAL EXAM:    Constitutional:  (   ) NAD,   (   )awake and alert  HEENT: PERR, EOMI,    Neck: Soft and supple, No LAD, No JVD  Respiratory:  (    Breath sounds are clear bilaterally,    (   ) wheezing, rales or rhonchi  Cardiovascular:     (   )S1 and S2, regular rate and rhythm, no Murmurs, gallops or rubs  Gastrointestinal:  (   )Bowel Sounds present, soft,   (  )nontender, nondistended,    Extremities:    (  ) peripheral edema  Vascular: 2+ peripheral pulses  Neurological:    (    )A/O x 3,   (  ) focal deficits  Musculoskeletal:    (   )  normal strength b/l upper  (     ) normal  lower extremities  Skin: No rashes    MEDICATIONS:  MEDICATIONS  (STANDING):  aspirin enteric coated 81 milliGRAM(s) Oral daily  atorvastatin 40 milliGRAM(s) Oral at bedtime  chlorhexidine 2% Cloths 1 Application(s) Topical daily  dextrose 5%. 1000 milliLiter(s) (50 mL/Hr) IV Continuous <Continuous>  dextrose 50% Injectable 12.5 Gram(s) IV Push once  dextrose 50% Injectable 25 Gram(s) IV Push once  dextrose 50% Injectable 25 Gram(s) IV Push once  digoxin     Tablet 0.125 milliGRAM(s) Oral daily  DOBUTamine Infusion 5 MICROgram(s)/kG/Min (8.31 mL/Hr) IV Continuous <Continuous>  docusate sodium 100 milliGRAM(s) Oral two times a day  furosemide Infusion 10 mG/Hr (5 mL/Hr) IV Continuous <Continuous>  heparin  Injectable 5000 Unit(s) SubCutaneous every 8 hours  hydrALAZINE 20 milliGRAM(s) Oral every 8 hours  insulin glargine Injectable (LANTUS) 28 Unit(s) SubCutaneous at bedtime  insulin lispro (HumaLOG) corrective regimen sliding scale   SubCutaneous three times a day before meals  insulin lispro (HumaLOG) corrective regimen sliding scale   SubCutaneous at bedtime  insulin lispro Injectable (HumaLOG) 11 Unit(s) SubCutaneous three times a day before meals  levothyroxine 50 MICROGram(s) Oral daily  lidocaine   Patch 1 Patch Transdermal daily  melatonin 3 milliGRAM(s) Oral at bedtime  meropenem  IVPB 500 milliGRAM(s) IV Intermittent every 12 hours  montelukast 10 milliGRAM(s) Oral daily  pantoprazole    Tablet 40 milliGRAM(s) Oral before breakfast  polyethylene glycol 3350 17 Gram(s) Oral daily  potassium phosphate / sodium phosphate powder 1 Packet(s) Oral three times a day before meals  senna 2 Tablet(s) Oral at bedtime  ticagrelor 90 milliGRAM(s) Oral every 12 hours  vancomycin  IVPB 750 milliGRAM(s) IV Intermittent every 12 hours  vancomycin  IVPB          LABS: All Labs Reviewed:                        8.8    15.0  )-----------( 362      ( 10 Jul 2019 12:50 )             25.7     07-10    140  |  103  |  55<H>  ----------------------------<  124<H>  4.1   |  20<L>  |  2.24<H>    Ca    7.7<L>      10 Jul 2019 15:37  Phos  1.9     07-10  Mg     2.2     07-10    TPro  7.2  /  Alb  3.5  /  TBili  0.5  /  DBili  x   /  AST  688<H>  /  ALT  762<H>  /  AlkPhos  146<H>  07-10      CARDIAC MARKERS ( 09 Jul 2019 03:50 )  x     / x     / 36 U/L / x     / x      CARDIAC MARKERS ( 09 Jul 2019 01:29 )  x     / x     / 42 U/L / x     / 1.7 ng/mL      Blood Culture: 07-09 @ 17:46  Organism --  Gram Stain Blood -- Gram Stain --  Specimen Source .Blood  Culture-Blood --      Urine Culture      RADIOLOGY/EKG:    ASSESSMENT AND PLAN:    DVT PPX:    ADVANCED DIRECTIVE:    DISPOSITION: CHIEF COMPLAINT:  Patient is a 73y old  Female who presents with a chief complaint of Hypoxia, SOB (10 Jul 2019 00:19)    Miss Gamino is 72 yo woman with PMHx of HTN, T2DM, CAD s/p CABG (LIMA to LAD, SVG to OM, SVG to PDA 2014 at [facility]), non-dilated ICM (EF 20-25%), severe mitral regurgitation s/p mitral clip (6/13/19 Dr. Newman), severe pulm HTN, CKD, hypothyroidism, who is presenting to ED after experiencing worsening SOB and intermittent chest pain at [facility]. In ED, pt was placed on BiPAP and given lasix 40mg IVPx1.  Transferred to CCU for further management. Undergoing diuresis    INTERVAL HISTORYOVERNIGHT EVENTS:   Pt has SVT w/ 's overnight associated  SUBJECTIVE:     REVIEW OF SYSTEMS:    CONSTITUTIONAL: ( x )  weakness,  (  ) fevers or chills  EYES/ENT: (  )visual changes;     NECK: (  ) pain or stiffness  RESPIRATORY:   (  )cough, wheezing, hemoptysis;  (  ) shortness of breath  CARDIOVASCULAR:  (x  ) palpitations  GASTROINTESTINAL:   (  )abdominal or epigastric pain.  (  ) nausea, vomiting, or hematemesis;   (   ) diarrhea or constipation.   GENITOURINARY:   (    ) dysuria, frequency or hematuria  NEUROLOGICAL:  (   ) numbness or weakness   All other review of systems is negative unless indicated above    Vital Signs Last 24 Hrs  T(C): 36.9 (10 Jul 2019 07:00), Max: 37 (10 Jul 2019 04:00)  T(F): 98.4 (10 Jul 2019 07:00), Max: 98.6 (10 Jul 2019 04:00)  HR: 84 (10 Jul 2019 19:00) (76 - 92)  BP: 131/56 (10 Jul 2019 19:00) (97/43 - 134/57)  BP(mean): 86 (10 Jul 2019 19:00) (60 - 110)  RR: 19 (10 Jul 2019 19:00) (18 - 28)  SpO2: 99% (10 Jul 2019 19:00) (94% - 100%)    I&O's Summary    09 Jul 2019 07:01  -  10 Jul 2019 07:00  --------------------------------------------------------  IN: 1554.2 mL / OUT: 3180 mL / NET: -1625.8 mL    10 Jul 2019 07:01  -  10 Jul 2019 19:40  --------------------------------------------------------  IN: 259.6 mL / OUT: 2550 mL / NET: -2290.4 mL        CAPILLARY BLOOD GLUCOSE      POCT Blood Glucose.: 157 mg/dL (10 Jul 2019 11:54)  POCT Blood Glucose.: 206 mg/dL (10 Jul 2019 07:54)  POCT Blood Glucose.: 190 mg/dL (09 Jul 2019 22:40)      PHYSICAL EXAM:     General: letargic  HEENT: PERRLA, EOMI, moist mucous membranes  Neurology: A&Ox3, nonfocal, CUADRA x 4  Respiratory: crackles B/L  CV: RRR, S1S2, no murmurs, rubs or gallops  Abdominal: Soft, NT, ND +BS, Last BM  Extremities: No edema, + peripheral pulses  Lafe in place    MEDICATIONS:  MEDICATIONS  (STANDING):  aspirin enteric coated 81 milliGRAM(s) Oral daily  atorvastatin 40 milliGRAM(s) Oral at bedtime  chlorhexidine 2% Cloths 1 Application(s) Topical daily  dextrose 5%. 1000 milliLiter(s) (50 mL/Hr) IV Continuous <Continuous>  dextrose 50% Injectable 12.5 Gram(s) IV Push once  dextrose 50% Injectable 25 Gram(s) IV Push once  dextrose 50% Injectable 25 Gram(s) IV Push once  digoxin     Tablet 0.125 milliGRAM(s) Oral daily  DOBUTamine Infusion 5 MICROgram(s)/kG/Min (8.31 mL/Hr) IV Continuous <Continuous>  docusate sodium 100 milliGRAM(s) Oral two times a day  furosemide Infusion 10 mG/Hr (5 mL/Hr) IV Continuous <Continuous>  heparin  Injectable 5000 Unit(s) SubCutaneous every 8 hours  hydrALAZINE 20 milliGRAM(s) Oral every 8 hours  insulin glargine Injectable (LANTUS) 28 Unit(s) SubCutaneous at bedtime  insulin lispro (HumaLOG) corrective regimen sliding scale   SubCutaneous three times a day before meals  insulin lispro (HumaLOG) corrective regimen sliding scale   SubCutaneous at bedtime  insulin lispro Injectable (HumaLOG) 11 Unit(s) SubCutaneous three times a day before meals  levothyroxine 50 MICROGram(s) Oral daily  lidocaine   Patch 1 Patch Transdermal daily  melatonin 3 milliGRAM(s) Oral at bedtime  meropenem  IVPB 500 milliGRAM(s) IV Intermittent every 12 hours  montelukast 10 milliGRAM(s) Oral daily  pantoprazole    Tablet 40 milliGRAM(s) Oral before breakfast  polyethylene glycol 3350 17 Gram(s) Oral daily  potassium phosphate / sodium phosphate powder 1 Packet(s) Oral three times a day before meals  senna 2 Tablet(s) Oral at bedtime  ticagrelor 90 milliGRAM(s) Oral every 12 hours  vancomycin  IVPB 750 milliGRAM(s) IV Intermittent every 12 hours  vancomycin  IVPB          LABS: All Labs Reviewed:                        8.8    15.0  )-----------( 362      ( 10 Jul 2019 12:50 )             25.7     07-10    140  |  103  |  55<H>  ----------------------------<  124<H>  4.1   |  20<L>  |  2.24<H>    Ca    7.7<L>      10 Jul 2019 15:37  Phos  1.9     07-10  Mg     2.2     07-10    TPro  7.2  /  Alb  3.5  /  TBili  0.5  /  DBili  x   /  AST  688<H>  /  ALT  762<H>  /  AlkPhos  146<H>  07-10      CARDIAC MARKERS ( 09 Jul 2019 03:50 )  x     / x     / 36 U/L / x     / x      CARDIAC MARKERS ( 09 Jul 2019 01:29 )  x     / x     / 42 U/L / x     / 1.7 ng/mL      Blood Culture: 07-09 @ 17:46  Organism --  Gram Stain Blood -- Gram Stain --  Specimen Source .Blood  Culture-Blood --      Urine Culture      RADIOLOGY/EKG:    ASSESSMENT AND PLAN:  72 yo woman with PMHx of HTN, T2DM, CAD s/p CABG (LIMA to LAD, SVG to OM, SVG to PDA 2014 at [facility]), non-dilated ICM (EF 20-25%), severe mitral regurgitation s/p mitral clip (6/13/19 Dr. Newman), severe pulm HTN, CKD, hypothyroidism, who is presenting to ED after experiencing worsening SOB and intermittent chest pain at [facility]. In ED, pt was placed on BiPAP and given lasix 40mg IVPx1.  Transferred to CCU for further management. Undergoing diuresis, with improved hemodynamics    Cardiac  Acute decompensated heart failure/volume   -TTE 7/5 showed EF 15-20% with global LV systolic dysfunction  -lasix gtt 10  -Strict I/O, trend weights  -Cardiac enzymes plateau'd  -structural heart disease consulted    Cardioprotection  -ASA, brilinta, statin  -d/c'd lopressor 12.5  -DM management as below  -to trend bp's    Hemodynamics  -uptitrating hydralizine to 20 to lower SVR  -d'c;d vaso as elevated SVR point's to elevated afterload  -d/c'd beta blocker as above  -to monitor bp, if can tolerate to start on hydralizine 10mg o/n  -continue to monitor hemodynamics q4    EP  -now NSR  -started digoxin load  -EP consulted, no amiodarone 2/2 uptrending LFTs  -outpt followup for ICD implant for primary prevention  -On tele  -Continue to monitor  -Keep K>4 and Mg>2  -replete electrolytes as needed    Pulmonary  -acute respiratory distress 2/2 to ADHF, resolved  -off bi-pap  -sating well on room air  -continue to trend SpO2    Endo  DM, hyperglycemia  -Hgb A1C 7.4  -started Lantus 25U at bedtime  -started ISS pre meal and at bedtime  -Started lispro 10U pre meal    Hypothyroidism  -On synthroid  -TSH 2.0  -T4, Serum: 6.8 ug/dL (06.12.19 @ 15:17)    Renal, CKD IV   -New baseline of 1.8-2.0 as per nephro  -Uptrending Cr, likely pre-renal d/t low CO/CI  -anticipating to improve with improving hemodynamics  -To trend renal function    GI  -abdominal pain  -likely 2/2 to bowel edema/ischemia 2/2 to low CO/CI  -KUB (-) for acute abdominal pathology  -transaminitis, also likely 2/2 to low output state now downtrending  -anticipate improvement with improving hemodynamics  -to continue to trend    ID  -afebrile  -WBCs  up 13.8 -->15.1  -Pan Cx ordered  -UCx grew ESBL, sensitive to penems, TMP-Sulfa, NFT  -Started vanc/zosyn (day 2)  -d/c'd zosyn as resistant  -started meropenem 500 BID  -BCx isolated no organisms       Heme  -anemia, chronic  -b/l appx 8-9  -to trend    DVT PPX:    ADVANCED DIRECTIVE: DW  her family  @ her condition ? comfort care ??    DISPOSITION:

## 2019-07-10 NOTE — PROGRESS NOTE ADULT - ASSESSMENT
Patient is a 73 year old female with past medical history of HTN, HLD, DMT2, pulmonary HTN, CKD, CAD s/p CABG in 2014, last stent Nov 2018, ICM, EF 20- 25%, severe mitral regurgitation s/p mitral clip on 6/13/19 with Dr. Valera and hypothyroidism presented to ED with c/o SOB and chest pain from [facility]. Currently admitted with acute decompensated heart failure of lasix and dobutamine drip. Overnight pt noted to have episode of SVT 's, lasting approximately 2 hours, treated with Amio 150mg IVP and lopressor 5mg IV x 1 then became bradycardiac with HR 40's. Started on digoxin load.  EP consulted for further management of SVT.      # SVT  # Decompensated HF  - ECHO: EF 21%, severe global left ventricular systolic dysfunction,   - Tele: SR HR 80- 90's PVCs no overnight events noted   - Currently on lasix and dobutamine drip for HF   - Unable to start Amiodarone at this time due to elevated LFT ( , )  - Continue to monitor telemetry closely  - Pt may follow up as an outpatient for ICD implant for primary prevention with Dr. Miguel A Uriarte ([facility])     [id number]

## 2019-07-10 NOTE — PROGRESS NOTE ADULT - SUBJECTIVE AND OBJECTIVE BOX
[facility]   MD RAHEEL SHAH MD RUORU WONG, PA    TEL:  OFFICE: [phone]  DR SANTOYO CELL: [phone]  JUSTEN KENDALL CELL: [phone]  DR. NGUYEN CELL: [phone]    RENAL FOLLOW UP NOTE  --------------------------------------------------------------------------------  HPI:      Pt seen and examined at bedside.       PAST HISTORY  --------------------------------------------------------------------------------  No significant changes to PMH, PSH, FHx, SHx, unless otherwise noted    ALLERGIES & MEDICATIONS  --------------------------------------------------------------------------------  Allergies    azithromycin (Hives; Pruritus)    Intolerances      Standing Inpatient Medications  aspirin enteric coated 81 milliGRAM(s) Oral daily  atorvastatin 40 milliGRAM(s) Oral at bedtime  chlorhexidine 2% Cloths 1 Application(s) Topical daily  dextrose 5%. 1000 milliLiter(s) IV Continuous <Continuous>  dextrose 50% Injectable 12.5 Gram(s) IV Push once  dextrose 50% Injectable 25 Gram(s) IV Push once  dextrose 50% Injectable 25 Gram(s) IV Push once  digoxin     Tablet 0.125 milliGRAM(s) Oral daily  DOBUTamine Infusion 5 MICROgram(s)/kG/Min IV Continuous <Continuous>  docusate sodium 100 milliGRAM(s) Oral two times a day  furosemide Infusion 10 mG/Hr IV Continuous <Continuous>  heparin  Injectable 5000 Unit(s) SubCutaneous every 8 hours  hydrALAZINE 20 milliGRAM(s) Oral every 8 hours  insulin glargine Injectable (LANTUS) 28 Unit(s) SubCutaneous at bedtime  insulin lispro (HumaLOG) corrective regimen sliding scale   SubCutaneous three times a day before meals  insulin lispro (HumaLOG) corrective regimen sliding scale   SubCutaneous at bedtime  insulin lispro Injectable (HumaLOG) 11 Unit(s) SubCutaneous three times a day before meals  levothyroxine 50 MICROGram(s) Oral daily  lidocaine   Patch 1 Patch Transdermal daily  melatonin 3 milliGRAM(s) Oral at bedtime  meropenem  IVPB 500 milliGRAM(s) IV Intermittent every 12 hours  montelukast 10 milliGRAM(s) Oral daily  pantoprazole    Tablet 40 milliGRAM(s) Oral before breakfast  polyethylene glycol 3350 17 Gram(s) Oral daily  potassium chloride    Tablet ER 40 milliEquivalent(s) Oral once  senna 2 Tablet(s) Oral at bedtime  ticagrelor 90 milliGRAM(s) Oral every 12 hours  vancomycin  IVPB 750 milliGRAM(s) IV Intermittent every 12 hours  vancomycin  IVPB        PRN Inpatient Medications  acetaminophen   Tablet .. 650 milliGRAM(s) Oral every 6 hours PRN  dextrose 40% Gel 15 Gram(s) Oral once PRN  glucagon  Injectable 1 milliGRAM(s) IntraMuscular once PRN      REVIEW OF SYSTEMS  --------------------------------------------------------------------------------  General: no fever  CVS: no chest pain  RESP: improving sob, no cough  ABD: no abdominal pain  MSK: no edema     VITALS/PHYSICAL EXAM  --------------------------------------------------------------------------------  T(C): 36.9 (07-10-19 @ 07:00), Max: 37 (07-10-19 @ 04:00)  HR: 88 (07-10-19 @ 13:00) (76 - 92)  BP: 103/51 (07-10-19 @ 13:00) (92/46 - 131/40)  RR: 25 (07-10-19 @ 13:00) (18 - 28)  SpO2: 99% (07-10-19 @ 13:00) (94% - 99%)  Wt(kg): --        07-09-19 @ 07:01  -  07-10-19 @ 07:00  --------------------------------------------------------  IN: 1554.2 mL / OUT: 3180 mL / NET: -1625.8 mL    07-10-19 @ 07:01  -  07-10-19 @ 14:08  --------------------------------------------------------  IN: 179.8 mL / OUT: 1300 mL / NET: -1120.2 mL      Physical Exam:  	Gen: NAD  	HEENT: MMM  	Pulm: CTA B/L  	CV: S1S2  	Abd: Soft, +BS  	Ext: No LE edema B/L                      Neuro: Awake   	Skin: Warm and Dry   	JUAN FRANCISCO cuellar    LABS/STUDIES  --------------------------------------------------------------------------------              8.8    15.0  >-----------<  362      [07-10-19 @ 12:50]              25.7     141  |  105  |  53  ----------------------------<  180      [07-10-19 @ 12:50]  3.7   |  20  |  2.24        Ca     7.8     [07-10-19 @ 12:50]      Mg     2.3     [07-10-19 @ 12:50]      Phos  2.1     [07-10-19 @ 12:50]    TPro  7.2  /  Alb  3.5  /  TBili  0.5  /  DBili  x   /  AST  669  /  ALT  790  /  AlkPhos  145  [07-10-19 @ 12:50]        CK 36      [07-09-19 @ 03:50]    Creatinine Trend:  SCr 2.24 [07-10 @ 12:50]  SCr 2.50 [07-10 @ 04:52]  SCr 2.61 [07-09 @ 21:58]  SCr 2.56 [07-09 @ 14:50]  SCr 2.50 [07-09 @ 03:50]    Urinalysis - [07-09-19 @ 13:28]      Color Yellow / Appearance Clear / SG 1.012 / pH 6.0      Gluc Negative / Ketone Negative  / Bili Negative / Urobili Negative       Blood Trace / Protein Trace / Leuk Est Negative / Nitrite Negative      RBC 1 / WBC 1 / Hyaline 0 / Gran  / Sq Epi  / Non Sq Epi 0 / Bacteria Moderate    Urine Creatinine 39      [07-07-19 @ 05:09]  Urine Sodium 72      [07-07-19 @ 00:58]  Urine Urea Nitrogen 342      [07-07-19 @ 04:36]    Iron 42, TIBC 348, %sat 12      [07-05-19 @ 22:32]  Ferritin 130      [07-05-19 @ 22:32]  .4 (Ca --)      [04-25-19 @ 05:45]   --  PTH 43.55 (Ca --)      [09-10-18 @ 07:15]   --  PTH 36.60 (Ca --)      [09-08-18 @ 03:15]   --  Vitamin D (25OH) 25.9      [05-01-19 @ 04:00]  HbA1c 7.4      [07-05-19 @ 22:32]  TSH 2.00      [07-05-19 @ 22:32]  Lipid: chol 148, , HDL 35,       [06-01-19 @ 08:00]

## 2019-07-11 LAB
ALBUMIN SERPL ELPH-MCNC: 3.7 G/DL — SIGNIFICANT CHANGE UP (ref 3.3–5)
ALBUMIN SERPL ELPH-MCNC: 3.8 G/DL — SIGNIFICANT CHANGE UP (ref 3.3–5)
ALBUMIN SERPL ELPH-MCNC: 3.9 G/DL — SIGNIFICANT CHANGE UP (ref 3.3–5)
ALP SERPL-CCNC: 149 U/L — HIGH (ref 40–120)
ALP SERPL-CCNC: 151 U/L — HIGH (ref 40–120)
ALP SERPL-CCNC: 151 U/L — HIGH (ref 40–120)
ALT FLD-CCNC: 620 U/L — HIGH (ref 10–45)
ALT FLD-CCNC: 720 U/L — HIGH (ref 10–45)
ALT FLD-CCNC: 729 U/L — HIGH (ref 10–45)
ANION GAP SERPL CALC-SCNC: 15 MMOL/L — SIGNIFICANT CHANGE UP (ref 5–17)
ANION GAP SERPL CALC-SCNC: 16 MMOL/L — SIGNIFICANT CHANGE UP (ref 5–17)
ANION GAP SERPL CALC-SCNC: 19 MMOL/L — HIGH (ref 5–17)
AST SERPL-CCNC: 345 U/L — HIGH (ref 10–40)
AST SERPL-CCNC: 419 U/L — HIGH (ref 10–40)
AST SERPL-CCNC: 484 U/L — HIGH (ref 10–40)
BASE EXCESS BLDMV CALC-SCNC: 1.4 MMOL/L — SIGNIFICANT CHANGE UP (ref -3–3)
BASE EXCESS BLDMV CALC-SCNC: 1.5 MMOL/L — SIGNIFICANT CHANGE UP (ref -3–3)
BASE EXCESS BLDMV CALC-SCNC: 1.8 MMOL/L — SIGNIFICANT CHANGE UP (ref -3–3)
BASOPHILS # BLD AUTO: 0 K/UL — SIGNIFICANT CHANGE UP (ref 0–0.2)
BASOPHILS NFR BLD AUTO: 0 % — SIGNIFICANT CHANGE UP (ref 0–2)
BASOPHILS NFR BLD AUTO: 0.1 % — SIGNIFICANT CHANGE UP (ref 0–2)
BASOPHILS NFR BLD AUTO: 0.2 % — SIGNIFICANT CHANGE UP (ref 0–2)
BILIRUB SERPL-MCNC: 0.6 MG/DL — SIGNIFICANT CHANGE UP (ref 0.2–1.2)
BILIRUB SERPL-MCNC: 0.6 MG/DL — SIGNIFICANT CHANGE UP (ref 0.2–1.2)
BILIRUB SERPL-MCNC: 0.7 MG/DL — SIGNIFICANT CHANGE UP (ref 0.2–1.2)
BUN SERPL-MCNC: 49 MG/DL — HIGH (ref 7–23)
BUN SERPL-MCNC: 49 MG/DL — HIGH (ref 7–23)
BUN SERPL-MCNC: 52 MG/DL — HIGH (ref 7–23)
CALCIUM SERPL-MCNC: 8.2 MG/DL — LOW (ref 8.4–10.5)
CALCIUM SERPL-MCNC: 8.4 MG/DL — SIGNIFICANT CHANGE UP (ref 8.4–10.5)
CALCIUM SERPL-MCNC: 8.5 MG/DL — SIGNIFICANT CHANGE UP (ref 8.4–10.5)
CHLORIDE SERPL-SCNC: 100 MMOL/L — SIGNIFICANT CHANGE UP (ref 96–108)
CHLORIDE SERPL-SCNC: 101 MMOL/L — SIGNIFICANT CHANGE UP (ref 96–108)
CHLORIDE SERPL-SCNC: 99 MMOL/L — SIGNIFICANT CHANGE UP (ref 96–108)
CO2 BLDMV-SCNC: 25 MMOL/L — SIGNIFICANT CHANGE UP (ref 21–29)
CO2 BLDMV-SCNC: 26 MMOL/L — SIGNIFICANT CHANGE UP (ref 21–29)
CO2 BLDMV-SCNC: 26 MMOL/L — SIGNIFICANT CHANGE UP (ref 21–29)
CO2 SERPL-SCNC: 20 MMOL/L — LOW (ref 22–31)
CO2 SERPL-SCNC: 21 MMOL/L — LOW (ref 22–31)
CO2 SERPL-SCNC: 23 MMOL/L — SIGNIFICANT CHANGE UP (ref 22–31)
CREAT SERPL-MCNC: 2.24 MG/DL — HIGH (ref 0.5–1.3)
CREAT SERPL-MCNC: 2.37 MG/DL — HIGH (ref 0.5–1.3)
CREAT SERPL-MCNC: 2.42 MG/DL — HIGH (ref 0.5–1.3)
DIGOXIN SERPL-MCNC: 2.1 NG/ML — HIGH (ref 0.8–2)
EOSINOPHIL # BLD AUTO: 0.3 K/UL — SIGNIFICANT CHANGE UP (ref 0–0.5)
EOSINOPHIL # BLD AUTO: 0.4 K/UL — SIGNIFICANT CHANGE UP (ref 0–0.5)
EOSINOPHIL # BLD AUTO: 0.5 K/UL — SIGNIFICANT CHANGE UP (ref 0–0.5)
EOSINOPHIL NFR BLD AUTO: 2.1 % — SIGNIFICANT CHANGE UP (ref 0–6)
EOSINOPHIL NFR BLD AUTO: 3.1 % — SIGNIFICANT CHANGE UP (ref 0–6)
EOSINOPHIL NFR BLD AUTO: 3.7 % — SIGNIFICANT CHANGE UP (ref 0–6)
GAS PNL BLDMV: SIGNIFICANT CHANGE UP
GAS PNL BLDV: SIGNIFICANT CHANGE UP
GLUCOSE BLDC GLUCOMTR-MCNC: 124 MG/DL — HIGH (ref 70–99)
GLUCOSE BLDC GLUCOMTR-MCNC: 207 MG/DL — HIGH (ref 70–99)
GLUCOSE BLDC GLUCOMTR-MCNC: 236 MG/DL — HIGH (ref 70–99)
GLUCOSE BLDC GLUCOMTR-MCNC: 271 MG/DL — HIGH (ref 70–99)
GLUCOSE SERPL-MCNC: 123 MG/DL — HIGH (ref 70–99)
GLUCOSE SERPL-MCNC: 203 MG/DL — HIGH (ref 70–99)
GLUCOSE SERPL-MCNC: 222 MG/DL — HIGH (ref 70–99)
HCO3 BLDMV-SCNC: 24 MMOL/L — SIGNIFICANT CHANGE UP (ref 20–28)
HCO3 BLDMV-SCNC: 25 MMOL/L — SIGNIFICANT CHANGE UP (ref 20–28)
HCO3 BLDMV-SCNC: 25 MMOL/L — SIGNIFICANT CHANGE UP (ref 20–28)
HCT VFR BLD CALC: 25.6 % — LOW (ref 34.5–45)
HCT VFR BLD CALC: 27.6 % — LOW (ref 34.5–45)
HCT VFR BLD CALC: 27.6 % — LOW (ref 34.5–45)
HGB BLD-MCNC: 8.8 G/DL — LOW (ref 11.5–15.5)
HGB BLD-MCNC: 9.1 G/DL — LOW (ref 11.5–15.5)
HGB BLD-MCNC: 9.2 G/DL — LOW (ref 11.5–15.5)
HOROWITZ INDEX BLDMV+IHG-RTO: 21 — SIGNIFICANT CHANGE UP
LACTATE SERPL-SCNC: 1 MMOL/L — SIGNIFICANT CHANGE UP (ref 0.7–2)
LACTATE SERPL-SCNC: 1.6 MMOL/L — SIGNIFICANT CHANGE UP (ref 0.7–2)
LYMPHOCYTES # BLD AUTO: 0.8 K/UL — LOW (ref 1–3.3)
LYMPHOCYTES # BLD AUTO: 1.1 K/UL — SIGNIFICANT CHANGE UP (ref 1–3.3)
LYMPHOCYTES # BLD AUTO: 1.2 K/UL — SIGNIFICANT CHANGE UP (ref 1–3.3)
LYMPHOCYTES # BLD AUTO: 6.1 % — LOW (ref 13–44)
LYMPHOCYTES # BLD AUTO: 8.6 % — LOW (ref 13–44)
LYMPHOCYTES # BLD AUTO: 8.8 % — LOW (ref 13–44)
MAGNESIUM SERPL-MCNC: 2.2 MG/DL — SIGNIFICANT CHANGE UP (ref 1.6–2.6)
MAGNESIUM SERPL-MCNC: 2.3 MG/DL — SIGNIFICANT CHANGE UP (ref 1.6–2.6)
MAGNESIUM SERPL-MCNC: 2.4 MG/DL — SIGNIFICANT CHANGE UP (ref 1.6–2.6)
MCHC RBC-ENTMCNC: 28.7 PG — SIGNIFICANT CHANGE UP (ref 27–34)
MCHC RBC-ENTMCNC: 29.3 PG — SIGNIFICANT CHANGE UP (ref 27–34)
MCHC RBC-ENTMCNC: 30 PG — SIGNIFICANT CHANGE UP (ref 27–34)
MCHC RBC-ENTMCNC: 32.8 GM/DL — SIGNIFICANT CHANGE UP (ref 32–36)
MCHC RBC-ENTMCNC: 33.2 GM/DL — SIGNIFICANT CHANGE UP (ref 32–36)
MCHC RBC-ENTMCNC: 34.3 GM/DL — SIGNIFICANT CHANGE UP (ref 32–36)
MCV RBC AUTO: 87.6 FL — SIGNIFICANT CHANGE UP (ref 80–100)
MCV RBC AUTO: 87.7 FL — SIGNIFICANT CHANGE UP (ref 80–100)
MCV RBC AUTO: 88.2 FL — SIGNIFICANT CHANGE UP (ref 80–100)
MONOCYTES # BLD AUTO: 0.7 K/UL — SIGNIFICANT CHANGE UP (ref 0–0.9)
MONOCYTES NFR BLD AUTO: 4.9 % — SIGNIFICANT CHANGE UP (ref 2–14)
MONOCYTES NFR BLD AUTO: 4.9 % — SIGNIFICANT CHANGE UP (ref 2–14)
MONOCYTES NFR BLD AUTO: 5.4 % — SIGNIFICANT CHANGE UP (ref 2–14)
NEUTROPHILS # BLD AUTO: 10.5 K/UL — HIGH (ref 1.8–7.4)
NEUTROPHILS # BLD AUTO: 11.4 K/UL — HIGH (ref 1.8–7.4)
NEUTROPHILS # BLD AUTO: 11.9 K/UL — HIGH (ref 1.8–7.4)
NEUTROPHILS NFR BLD AUTO: 82 % — HIGH (ref 43–77)
NEUTROPHILS NFR BLD AUTO: 84.4 % — HIGH (ref 43–77)
NEUTROPHILS NFR BLD AUTO: 85.8 % — HIGH (ref 43–77)
O2 CT VFR BLD CALC: 32 MMHG — SIGNIFICANT CHANGE UP (ref 30–65)
O2 CT VFR BLD CALC: 35 MMHG — SIGNIFICANT CHANGE UP (ref 30–65)
O2 CT VFR BLD CALC: 39 MMHG — SIGNIFICANT CHANGE UP (ref 30–65)
PCO2 BLDMV: 30 MMHG — SIGNIFICANT CHANGE UP (ref 30–65)
PCO2 BLDMV: 35 MMHG — SIGNIFICANT CHANGE UP (ref 30–65)
PCO2 BLDMV: 36 MMHG — SIGNIFICANT CHANGE UP (ref 30–65)
PH BLDMV: 7.46 — HIGH (ref 7.32–7.45)
PH BLDMV: 7.47 — HIGH (ref 7.32–7.45)
PH BLDMV: 7.51 — HIGH (ref 7.32–7.45)
PHOSPHATE SERPL-MCNC: 2 MG/DL — LOW (ref 2.5–4.5)
PHOSPHATE SERPL-MCNC: 2.9 MG/DL — SIGNIFICANT CHANGE UP (ref 2.5–4.5)
PHOSPHATE SERPL-MCNC: 4.1 MG/DL — SIGNIFICANT CHANGE UP (ref 2.5–4.5)
PLATELET # BLD AUTO: 362 K/UL — SIGNIFICANT CHANGE UP (ref 150–400)
PLATELET # BLD AUTO: 374 K/UL — SIGNIFICANT CHANGE UP (ref 150–400)
PLATELET # BLD AUTO: 414 K/UL — HIGH (ref 150–400)
POTASSIUM SERPL-MCNC: 4.3 MMOL/L — SIGNIFICANT CHANGE UP (ref 3.5–5.3)
POTASSIUM SERPL-MCNC: 4.8 MMOL/L — SIGNIFICANT CHANGE UP (ref 3.5–5.3)
POTASSIUM SERPL-MCNC: 5.1 MMOL/L — SIGNIFICANT CHANGE UP (ref 3.5–5.3)
POTASSIUM SERPL-SCNC: 4.3 MMOL/L — SIGNIFICANT CHANGE UP (ref 3.5–5.3)
POTASSIUM SERPL-SCNC: 4.8 MMOL/L — SIGNIFICANT CHANGE UP (ref 3.5–5.3)
POTASSIUM SERPL-SCNC: 5.1 MMOL/L — SIGNIFICANT CHANGE UP (ref 3.5–5.3)
PROT SERPL-MCNC: 7.6 G/DL — SIGNIFICANT CHANGE UP (ref 6–8.3)
PROT SERPL-MCNC: 7.7 G/DL — SIGNIFICANT CHANGE UP (ref 6–8.3)
PROT SERPL-MCNC: 7.9 G/DL — SIGNIFICANT CHANGE UP (ref 6–8.3)
RBC # BLD: 2.92 M/UL — LOW (ref 3.8–5.2)
RBC # BLD: 3.12 M/UL — LOW (ref 3.8–5.2)
RBC # BLD: 3.15 M/UL — LOW (ref 3.8–5.2)
RBC # FLD: 17.8 % — HIGH (ref 10.3–14.5)
RBC # FLD: 17.8 % — HIGH (ref 10.3–14.5)
RBC # FLD: 17.9 % — HIGH (ref 10.3–14.5)
SAO2 % BLDMV: 58 % — LOW (ref 60–90)
SAO2 % BLDMV: 59 % — LOW (ref 60–90)
SAO2 % BLDMV: 66 % — SIGNIFICANT CHANGE UP (ref 60–90)
SODIUM SERPL-SCNC: 138 MMOL/L — SIGNIFICANT CHANGE UP (ref 135–145)
VANCOMYCIN TROUGH SERPL-MCNC: 23.5 UG/ML — HIGH (ref 10–20)
WBC # BLD: 12.8 K/UL — HIGH (ref 3.8–10.5)
WBC # BLD: 13.3 K/UL — HIGH (ref 3.8–10.5)
WBC # BLD: 14.1 K/UL — HIGH (ref 3.8–10.5)
WBC # FLD AUTO: 12.8 K/UL — HIGH (ref 3.8–10.5)
WBC # FLD AUTO: 13.3 K/UL — HIGH (ref 3.8–10.5)
WBC # FLD AUTO: 14.1 K/UL — HIGH (ref 3.8–10.5)

## 2019-07-11 PROCEDURE — ZZZZZ: CPT

## 2019-07-11 PROCEDURE — 71045 X-RAY EXAM CHEST 1 VIEW: CPT | Mod: 26

## 2019-07-11 PROCEDURE — 93010 ELECTROCARDIOGRAM REPORT: CPT

## 2019-07-11 RX ORDER — OXYCODONE HYDROCHLORIDE 5 MG/1
5 TABLET ORAL ONCE
Refills: 0 | Status: DISCONTINUED | OUTPATIENT
Start: 2019-07-11 | End: 2019-07-11

## 2019-07-11 RX ORDER — HYDRALAZINE HCL 50 MG
30 TABLET ORAL EVERY 8 HOURS
Refills: 0 | Status: DISCONTINUED | OUTPATIENT
Start: 2019-07-11 | End: 2019-07-12

## 2019-07-11 RX ORDER — ONDANSETRON 8 MG/1
4 TABLET, FILM COATED ORAL ONCE
Refills: 0 | Status: COMPLETED | OUTPATIENT
Start: 2019-07-11 | End: 2019-07-11

## 2019-07-11 RX ORDER — HYDRALAZINE HCL 50 MG
10 TABLET ORAL ONCE
Refills: 0 | Status: COMPLETED | OUTPATIENT
Start: 2019-07-11 | End: 2019-07-11

## 2019-07-11 RX ADMIN — Medication 11 UNIT(S): at 18:01

## 2019-07-11 RX ADMIN — TICAGRELOR 90 MILLIGRAM(S): 90 TABLET ORAL at 18:07

## 2019-07-11 RX ADMIN — Medication 30 MILLIGRAM(S): at 21:03

## 2019-07-11 RX ADMIN — Medication 30 MILLIGRAM(S): at 15:16

## 2019-07-11 RX ADMIN — Medication 2: at 12:39

## 2019-07-11 RX ADMIN — PANTOPRAZOLE SODIUM 40 MILLIGRAM(S): 20 TABLET, DELAYED RELEASE ORAL at 06:02

## 2019-07-11 RX ADMIN — Medication 100 MILLIGRAM(S): at 18:07

## 2019-07-11 RX ADMIN — POLYETHYLENE GLYCOL 3350 17 GRAM(S): 17 POWDER, FOR SOLUTION ORAL at 11:19

## 2019-07-11 RX ADMIN — Medication 100 MILLIGRAM(S): at 06:02

## 2019-07-11 RX ADMIN — Medication 50 MICROGRAM(S): at 06:03

## 2019-07-11 RX ADMIN — HEPARIN SODIUM 5000 UNIT(S): 5000 INJECTION INTRAVENOUS; SUBCUTANEOUS at 21:03

## 2019-07-11 RX ADMIN — Medication 2: at 18:00

## 2019-07-11 RX ADMIN — CHLORHEXIDINE GLUCONATE 1 APPLICATION(S): 213 SOLUTION TOPICAL at 00:08

## 2019-07-11 RX ADMIN — Medication 11 UNIT(S): at 08:33

## 2019-07-11 RX ADMIN — LIDOCAINE 1 PATCH: 4 CREAM TOPICAL at 07:51

## 2019-07-11 RX ADMIN — Medication 2.5 MG/HR: at 01:00

## 2019-07-11 RX ADMIN — HEPARIN SODIUM 5000 UNIT(S): 5000 INJECTION INTRAVENOUS; SUBCUTANEOUS at 06:02

## 2019-07-11 RX ADMIN — CHLORHEXIDINE GLUCONATE 1 APPLICATION(S): 213 SOLUTION TOPICAL at 21:04

## 2019-07-11 RX ADMIN — Medication 4.16 MICROGRAM(S)/KG/MIN: at 07:53

## 2019-07-11 RX ADMIN — OXYCODONE HYDROCHLORIDE 5 MILLIGRAM(S): 5 TABLET ORAL at 21:04

## 2019-07-11 RX ADMIN — MONTELUKAST 10 MILLIGRAM(S): 4 TABLET, CHEWABLE ORAL at 11:19

## 2019-07-11 RX ADMIN — Medication 11 UNIT(S): at 12:40

## 2019-07-11 RX ADMIN — ATORVASTATIN CALCIUM 40 MILLIGRAM(S): 80 TABLET, FILM COATED ORAL at 21:03

## 2019-07-11 RX ADMIN — ONDANSETRON 4 MILLIGRAM(S): 8 TABLET, FILM COATED ORAL at 21:03

## 2019-07-11 RX ADMIN — Medication 0.12 MILLIGRAM(S): at 11:19

## 2019-07-11 RX ADMIN — Medication 2.5 MG/HR: at 07:53

## 2019-07-11 RX ADMIN — INSULIN GLARGINE 28 UNIT(S): 100 INJECTION, SOLUTION SUBCUTANEOUS at 21:04

## 2019-07-11 RX ADMIN — TICAGRELOR 90 MILLIGRAM(S): 90 TABLET ORAL at 06:03

## 2019-07-11 RX ADMIN — OXYCODONE HYDROCHLORIDE 5 MILLIGRAM(S): 5 TABLET ORAL at 00:07

## 2019-07-11 RX ADMIN — Medication 81 MILLIGRAM(S): at 11:19

## 2019-07-11 RX ADMIN — Medication 3: at 08:32

## 2019-07-11 RX ADMIN — OXYCODONE HYDROCHLORIDE 5 MILLIGRAM(S): 5 TABLET ORAL at 21:30

## 2019-07-11 RX ADMIN — SENNA PLUS 2 TABLET(S): 8.6 TABLET ORAL at 21:03

## 2019-07-11 RX ADMIN — MEROPENEM 100 MILLIGRAM(S): 1 INJECTION INTRAVENOUS at 06:02

## 2019-07-11 RX ADMIN — LIDOCAINE 1 PATCH: 4 CREAM TOPICAL at 21:03

## 2019-07-11 RX ADMIN — LIDOCAINE 1 PATCH: 4 CREAM TOPICAL at 10:50

## 2019-07-11 RX ADMIN — Medication 30 MILLIGRAM(S): at 06:02

## 2019-07-11 RX ADMIN — Medication 4.16 MICROGRAM(S)/KG/MIN: at 01:00

## 2019-07-11 RX ADMIN — OXYCODONE HYDROCHLORIDE 5 MILLIGRAM(S): 5 TABLET ORAL at 00:37

## 2019-07-11 RX ADMIN — HEPARIN SODIUM 5000 UNIT(S): 5000 INJECTION INTRAVENOUS; SUBCUTANEOUS at 15:16

## 2019-07-11 NOTE — PROGRESS NOTE ADULT - SUBJECTIVE AND OBJECTIVE BOX
CHIEF COMPLAINT:  Patient was seen at 9:30 AM condition bettercompared to yesterday no family at the beds  Patient is a 73y old  Female who presents with a chief complaint of Hypoxia, SOB (11 Jul 2019 00:08)      INTERVAL HISTORYOVERNIGHT EVENTS:    RUBI o/n. No runs of SVT. No new complaints  SUBJECTIVE:     REVIEW OF SYSTEMS:    CONSTITUTIONAL: ( x )  weakness,  (  ) fevers or chills  EYES/ENT: (  )visual changes;     NECK: (  ) pain or stiffness  RESPIRATORY:   (  )cough, wheezing, hemoptysis;  (  ) shortness of breath  CARDIOVASCULAR:  (  )chest pain or palpitations  GASTROINTESTINAL:   (  )abdominal or epigastric pain.  (  ) nausea, vomiting, or hematemesis;   (   ) diarrhea or constipation.   GENITOURINARY:   (    ) dysuria, frequency or hematuria  NEUROLOGICAL:  (   ) numbness or weakness   All other review of systems is negative unless indicated above    Vital Signs Last 24 Hrs  T(C): 37.1 (11 Jul 2019 15:00), Max: 37.1 (11 Jul 2019 15:00)  T(F): 98.8 (11 Jul 2019 15:00), Max: 98.8 (11 Jul 2019 15:00)  HR: 84 (11 Jul 2019 16:00) (74 - 92)  BP: 98/45 (11 Jul 2019 16:00) (87/46 - 131/56)  BP(mean): 70 (11 Jul 2019 16:00) (62 - 90)  RR: 14 (11 Jul 2019 16:00) (14 - 25)  SpO2: 98% (11 Jul 2019 16:00) (95% - 100%)    I&O's Summary    10 Jul 2019 07:01  -  11 Jul 2019 07:00  --------------------------------------------------------  IN: 1143 mL / OUT: 4125 mL / NET: -2982 mL    11 Jul 2019 07:01  -  11 Jul 2019 16:54  --------------------------------------------------------  IN: 150.9 mL / OUT: 350 mL / NET: -199.1 mL        CAPILLARY BLOOD GLUCOSE      POCT Blood Glucose.: 207 mg/dL (11 Jul 2019 12:17)  POCT Blood Glucose.: 271 mg/dL (11 Jul 2019 08:25)  POCT Blood Glucose.: 154 mg/dL (10 Jul 2019 21:47)      PHYSICAL EXAM:     General: WN/WD NAD  HEENT: PERRLA, EOMI, moist mucous membranes  Neurology: A&Ox3, nonfocal, CUADRA x 4  Respiratory: CTA B/L, normal respiratory effort, no wheezes, crackles, rales  CV: RRR, S1S2, no murmurs, rubs or gallops  Abdominal: Soft, NT, ND +BS, Last BM  Extremities: No edema, + peripheral pulses    MEDICATIONS:  MEDICATIONS  (STANDING):  aspirin enteric coated 81 milliGRAM(s) Oral daily  atorvastatin 40 milliGRAM(s) Oral at bedtime  chlorhexidine 2% Cloths 1 Application(s) Topical daily  dextrose 5%. 1000 milliLiter(s) (50 mL/Hr) IV Continuous <Continuous>  dextrose 50% Injectable 12.5 Gram(s) IV Push once  dextrose 50% Injectable 25 Gram(s) IV Push once  dextrose 50% Injectable 25 Gram(s) IV Push once  digoxin     Tablet 0.125 milliGRAM(s) Oral daily  docusate sodium 100 milliGRAM(s) Oral two times a day  furosemide Infusion 5 mG/Hr (2.5 mL/Hr) IV Continuous <Continuous>  heparin  Injectable 5000 Unit(s) SubCutaneous every 8 hours  hydrALAZINE 30 milliGRAM(s) Oral every 8 hours  insulin glargine Injectable (LANTUS) 28 Unit(s) SubCutaneous at bedtime  insulin lispro (HumaLOG) corrective regimen sliding scale   SubCutaneous three times a day before meals  insulin lispro (HumaLOG) corrective regimen sliding scale   SubCutaneous at bedtime  insulin lispro Injectable (HumaLOG) 11 Unit(s) SubCutaneous three times a day before meals  levothyroxine 50 MICROGram(s) Oral daily  lidocaine   Patch 1 Patch Transdermal daily  melatonin 3 milliGRAM(s) Oral at bedtime  montelukast 10 milliGRAM(s) Oral daily  pantoprazole    Tablet 40 milliGRAM(s) Oral before breakfast  polyethylene glycol 3350 17 Gram(s) Oral daily  senna 2 Tablet(s) Oral at bedtime  ticagrelor 90 milliGRAM(s) Oral every 12 hours      LABS: All Labs Reviewed:                        9.1    13.3  )-----------( 414      ( 11 Jul 2019 13:08 )             27.6     07-11    138  |  100  |  49<H>  ----------------------------<  203<H>  4.8   |  23  |  2.37<H>    Ca    8.5      11 Jul 2019 13:08  Phos  2.9     07-11  Mg     2.4     07-11    TPro  7.9  /  Alb  3.9  /  TBili  0.6  /  DBili  x   /  AST  419<H>  /  ALT  720<H>  /  AlkPhos  151<H>  07-11          Blood Culture: 07-09 @ 17:46  Organism --  Gram Stain Blood -- Gram Stain --  Specimen Source .Blood  Culture-Blood --      Urine Culture      RADIOLOGY/EKG:    ASSESSMENT AND PLAN:   72 yo woman with PMHx of HTN, T2DM, CAD s/p CABG (LIMA to LAD, SVG to OM, SVG to PDA 2014 at [facility]), non-dilated ICM (EF 20-25%), severe mitral regurgitation s/p mitral clip (6/13/19 Dr. Newman), severe pulm HTN, CKD, hypothyroidism, who is presenting to ED after experiencing worsening SOB and intermittent chest pain at [facility]. In ED, pt was placed on BiPAP and given lasix 40mg IVPx1. Transferred to CCU for further management. Undergoing diuresis, with improved hemodynamics. Found to have a leukocytosis, thought to be reactive.     Cardiac  Acute decompensated heart failure/volume   -TTE 7/5 showed EF 15-20% with global LV systolic dysfunction  -lasix gtt titrated to 5. to switch to lasix 60 IV BID if good UOP   -Strict I/O, trend weights  -Cardiac enzymes plateau'd   Ion   ASA, brilinta, statin    -now NSR, no SVTs  -dig 0.125 po  -EP consulted, no amiodarone 2/2 uptrending LFTs  -outpt followup for ICD implant for primary prevention  -On tele  -Continue to monitor  -Keep K>4 and Mg>2  -replete electrolytes as needed    Pulmonary  -acute respiratory distress 2/2 to ADHF, resolved  -off bi-pap  -sating well on room air  -continue to trend SpO2    Endo  DM, hyperglycemia  -Hgb A1C 7.4  -started Lantus 25U at bedtime  -started ISS pre meal and at bedtime  -Started lispro 10U pre meal    Hypothyroidism  -On synthroid  -TSH 2.0  -T4, Serum: 6.8 ug/dL (06.12.19 @ 15:17)    Renal, CKD IV   -New baseline of 1.8-2.0 as per nephro  -Cr starting to downtrend  -improving given improved hemodynamics  -To trend renal function and avoid nephrotoxic meds    GI  -had abdominal pain, now resolved  -likely 2/2 to bowel edema/ischemia 2/2 to low CO/CI  -KUB (-) for acute abdominal pathology  -transaminitis, also likely 2/2 to low output state, continuing to downtrending  -anticipate improvement with improving hemodynamics  -to continue to trend    ID  -afebrile  -WBCs downtrending   -Feliciano Cx ordered  -UCx grew ESBL, sensitive to penems, TMP-Sulfa, NFT  -Noted to have grown ESBL in previous hospitalization (6/13)  -BCx NGTD  -Started vanc/zosyn (day 2)  -d/c'd zosyn as resistant  -started meropenem 500 BID  -ID consulted. leukocytosis = reactionary  -D/c'd abx given pt is afebrile, leukocytosis resolving, no localizing s&s  -to monitor off abx    Heme  -anemia, chronic  -b/l appx 8-9  -to trend    PPx: HSQ  FEN: none; replete electrolytes PRN;   DC NPO  Code status: Full      Dispo: c/w care on ICU Discuss with her family on 7/10/2019  DVT PPX:    ADVANCED DIRECTIVE:    DISPOSITION: home As per patient wishes

## 2019-07-11 NOTE — PROGRESS NOTE ADULT - SUBJECTIVE AND OBJECTIVE BOX
INTERVAL HPI/OVERNIGHT EVENTS: Pt seen resting in bed comfortably, no overnight events    MEDICATIONS  (STANDING):  aspirin enteric coated 81 milliGRAM(s) Oral daily  atorvastatin 40 milliGRAM(s) Oral at bedtime  chlorhexidine 2% Cloths 1 Application(s) Topical daily  dextrose 5%. 1000 milliLiter(s) (50 mL/Hr) IV Continuous <Continuous>  dextrose 50% Injectable 12.5 Gram(s) IV Push once  dextrose 50% Injectable 25 Gram(s) IV Push once  dextrose 50% Injectable 25 Gram(s) IV Push once  digoxin     Tablet 0.125 milliGRAM(s) Oral daily  docusate sodium 100 milliGRAM(s) Oral two times a day  furosemide Infusion 5 mG/Hr (2.5 mL/Hr) IV Continuous <Continuous>  heparin  Injectable 5000 Unit(s) SubCutaneous every 8 hours  hydrALAZINE 30 milliGRAM(s) Oral every 8 hours  insulin glargine Injectable (LANTUS) 28 Unit(s) SubCutaneous at bedtime  insulin lispro (HumaLOG) corrective regimen sliding scale   SubCutaneous three times a day before meals  insulin lispro (HumaLOG) corrective regimen sliding scale   SubCutaneous at bedtime  insulin lispro Injectable (HumaLOG) 11 Unit(s) SubCutaneous three times a day before meals  levothyroxine 50 MICROGram(s) Oral daily  lidocaine   Patch 1 Patch Transdermal daily  melatonin 3 milliGRAM(s) Oral at bedtime  montelukast 10 milliGRAM(s) Oral daily  pantoprazole    Tablet 40 milliGRAM(s) Oral before breakfast  polyethylene glycol 3350 17 Gram(s) Oral daily  senna 2 Tablet(s) Oral at bedtime  ticagrelor 90 milliGRAM(s) Oral every 12 hours    MEDICATIONS  (PRN):  acetaminophen   Tablet .. 650 milliGRAM(s) Oral every 6 hours PRN Mild Pain (1 - 3), Moderate Pain (4 - 6)  dextrose 40% Gel 15 Gram(s) Oral once PRN Blood Glucose LESS THAN 70 milliGRAM(s)/deciliter  glucagon  Injectable 1 milliGRAM(s) IntraMuscular once PRN Glucose LESS THAN 70 milligrams/deciliter      Allergies    azithromycin (Hives; Pruritus)    Intolerances      ROS:  Denies any pain, or discomfort.     Vital Signs Last 24 Hrs  T(C): 37 (2019 11:00), Max: 37 (2019 11:00)  T(F): 98.6 (2019 11:00), Max: 98.6 (2019 11:00)  HR: 82 (2019 11:00) (76 - 92)  BP: 97/50 (2019 11:00) (91/46 - 134/57)  BP(mean): 68 (2019 11:00) (62 - 93)  RR: 16 (:00) (16 - 26)  SpO2: 99% (2019 11:00) (95% - 100%)    Physical Exam:  Neurological: Alert & Oriented x 3  Respiratory: CTA B/L, No wheezing/crackles/rhonchi  Cardiovascular: (+) S1 & S2, RRR  Gastrointestinal: soft, NT, nondistended, (+) BS  Extremities: No pedal edema          LABS:                        8.8    14.1  )-----------( 374      ( 2019 03:21 )             25.6     07-11    138  |  101  |  49<H>  ----------------------------<  222<H>  5.1   |  21<L>  |  2.24<H>    Ca    8.4      2019 03:21  Phos  4.1       Mg     2.3     11    TPro  7.7  /  Alb  3.8  /  TBili  0.6  /  DBili  x   /  AST  484<H>  /  ALT  729<H>  /  AlkPhos  149<H>  07-11      Urinalysis Basic - ( 2019 13:28 )    Color: Yellow / Appearance: Clear / S.012 / pH: x  Gluc: x / Ketone: Negative  / Bili: Negative / Urobili: Negative   Blood: x / Protein: Trace / Nitrite: Negative   Leuk Esterase: Negative / RBC: 1 /hpf / WBC 1 /HPF   Sq Epi: x / Non Sq Epi: 0 /hpf / Bacteria: Moderate        RADIOLOGY & ADDITIONAL TESTS:    TELE:    EKG:

## 2019-07-11 NOTE — PROGRESS NOTE ADULT - SUBJECTIVE AND OBJECTIVE BOX
[facility]   MD RAHEEL SHAH MD RUORU WONG, PA    TEL:  OFFICE: [phone]  DR SANTOYO CELL: [phone]  JUSTEN KENDALL CELL: [phone]  DR. NGUYEN CELL: [phone]    RENAL FOLLOW UP NOTE  --------------------------------------------------------------------------------  HPI:      Pt seen and examined at bedside.     PAST HISTORY  --------------------------------------------------------------------------------  No significant changes to PMH, PSH, FHx, SHx, unless otherwise noted    ALLERGIES & MEDICATIONS  --------------------------------------------------------------------------------  Allergies    azithromycin (Hives; Pruritus)    Intolerances      Standing Inpatient Medications  aspirin enteric coated 81 milliGRAM(s) Oral daily  atorvastatin 40 milliGRAM(s) Oral at bedtime  chlorhexidine 2% Cloths 1 Application(s) Topical daily  dextrose 5%. 1000 milliLiter(s) IV Continuous <Continuous>  dextrose 50% Injectable 12.5 Gram(s) IV Push once  dextrose 50% Injectable 25 Gram(s) IV Push once  dextrose 50% Injectable 25 Gram(s) IV Push once  digoxin     Tablet 0.125 milliGRAM(s) Oral daily  DOBUTamine Infusion 2.5 MICROgram(s)/kG/Min IV Continuous <Continuous>  docusate sodium 100 milliGRAM(s) Oral two times a day  furosemide Infusion 5 mG/Hr IV Continuous <Continuous>  heparin  Injectable 5000 Unit(s) SubCutaneous every 8 hours  hydrALAZINE 30 milliGRAM(s) Oral every 8 hours  insulin glargine Injectable (LANTUS) 28 Unit(s) SubCutaneous at bedtime  insulin lispro (HumaLOG) corrective regimen sliding scale   SubCutaneous three times a day before meals  insulin lispro (HumaLOG) corrective regimen sliding scale   SubCutaneous at bedtime  insulin lispro Injectable (HumaLOG) 11 Unit(s) SubCutaneous three times a day before meals  levothyroxine 50 MICROGram(s) Oral daily  lidocaine   Patch 1 Patch Transdermal daily  melatonin 3 milliGRAM(s) Oral at bedtime  montelukast 10 milliGRAM(s) Oral daily  pantoprazole    Tablet 40 milliGRAM(s) Oral before breakfast  polyethylene glycol 3350 17 Gram(s) Oral daily  senna 2 Tablet(s) Oral at bedtime  ticagrelor 90 milliGRAM(s) Oral every 12 hours    PRN Inpatient Medications  acetaminophen   Tablet .. 650 milliGRAM(s) Oral every 6 hours PRN  dextrose 40% Gel 15 Gram(s) Oral once PRN  glucagon  Injectable 1 milliGRAM(s) IntraMuscular once PRN      REVIEW OF SYSTEMS  --------------------------------------------------------------------------------  General: no fever  CVS: no chest pain  RESP: improving  sob, no cough  ABD: no abdominal pain  : no dysuria,  MSK: no edema     VITALS/PHYSICAL EXAM  --------------------------------------------------------------------------------  T(C): 36.8 (07-11-19 @ 07:00), Max: 36.9 (07-10-19 @ 22:00)  HR: 92 (07-11-19 @ 09:00) (76 - 92)  BP: 106/49 (07-11-19 @ 09:00) (91/46 - 134/57)  RR: 16 (07-11-19 @ 09:00) (16 - 26)  SpO2: 99% (07-11-19 @ 09:00) (95% - 100%)  Wt(kg): --        07-10-19 @ 07:01  -  07-11-19 @ 07:00  --------------------------------------------------------  IN: 1143 mL / OUT: 4125 mL / NET: -2982 mL    07-11-19 @ 07:01  -  07-11-19 @ 10:26  --------------------------------------------------------  IN: 13.4 mL / OUT: 175 mL / NET: -161.6 mL      Physical Exam:  	Gen: NAD  	HEENT: MMM  	Pulm: CTA B/L  	CV: S1S2  	Abd: Soft, +BS  	Ext: No LE edema B/L                      Neuro: Awake   	Skin: Warm and Dry   	JUAN FRANCISCO cuellar    LABS/STUDIES  --------------------------------------------------------------------------------              8.8    14.1  >-----------<  374      [07-11-19 @ 03:21]              25.6     138  |  101  |  49  ----------------------------<  222      [07-11-19 @ 03:21]  5.1   |  21  |  2.24        Ca     8.4     [07-11-19 @ 03:21]      Mg     2.3     [07-11-19 @ 03:21]      Phos  4.1     [07-11-19 @ 03:21]    TPro  7.7  /  Alb  3.8  /  TBili  0.6  /  DBili  x   /  AST  484  /  ALT  729  /  AlkPhos  149  [07-11-19 @ 03:21]          Creatinine Trend:  SCr 2.24 [07-11 @ 03:21]  SCr 2.18 [07-10 @ 22:05]  SCr 2.24 [07-10 @ 15:37]  SCr 2.24 [07-10 @ 12:50]  SCr 2.50 [07-10 @ 04:52]    Urinalysis - [07-09-19 @ 13:28]      Color Yellow / Appearance Clear / SG 1.012 / pH 6.0      Gluc Negative / Ketone Negative  / Bili Negative / Urobili Negative       Blood Trace / Protein Trace / Leuk Est Negative / Nitrite Negative      RBC 1 / WBC 1 / Hyaline 0 / Gran  / Sq Epi  / Non Sq Epi 0 / Bacteria Moderate    Urine Creatinine 39      [07-07-19 @ 05:09]  Urine Sodium 72      [07-07-19 @ 00:58]  Urine Urea Nitrogen 342      [07-07-19 @ 04:36]    Iron 42, TIBC 348, %sat 12      [07-05-19 @ 22:32]  Ferritin 130      [07-05-19 @ 22:32]  .4 (Ca --)      [04-25-19 @ 05:45]   --  PTH 43.55 (Ca --)      [09-10-18 @ 07:15]   --  PTH 36.60 (Ca --)      [09-08-18 @ 03:15]   --  Vitamin D (25OH) 25.9      [05-01-19 @ 04:00]  HbA1c 7.4      [07-05-19 @ 22:32]  TSH 2.00      [07-05-19 @ 22:32]  Lipid: chol 148, , HDL 35,       [06-01-19 @ 08:00]

## 2019-07-11 NOTE — PROGRESS NOTE ADULT - ASSESSMENT
Pt is a 72 yo woman with PMHx of HTN, T2DM, CAD s/p CABG (LIMA to LAD, SVG to OM, SVG to PDA 2014 at [facility]), non-dilated ICM (EF 20-25%), severe mitral regurgitation s/p mitral clip (6/13/19 Dr. Newman), severe pulm HTN, CKD, hypothyroidism, who is presenting to ED after experiencing worsening SOB      A/P:      MERVIN  MERVIN sec to cardiogenic shock  Renal function improving  PT non -oliguric  Continue ionotropic support per CCU  Monitor renal function   Avoid further nephrotoxics, NSAIDS RCA    CKD stage 4:  baseline Scr 1.8-2.0  Renal function fluctuates sec to CHF  Monitor renal function at present    SOB:  in setting of HF  Continue care per CCU    ACidosis   sec to RF  mOnitor serum Co2 at present

## 2019-07-11 NOTE — DIETITIAN INITIAL EVALUATION ADULT. - OTHER INFO
INFORMATION PTA  Source: RN, Patient, electronic medical record  Patient Kinyarwanda speaking, per chart.  Some command of English, per RN.  Patient declined use of  phone during RD visit, limited subjective information (Pt closed eyes during interview).  •         Diet PTA: Per previous RD note, Pt Halal - requested lou last admission.    •         Nutrition Status PTA: unable to assess  •         Nutrition Supplements PTA: noted B-complex vitamins inconsistently per previous RD note (June 2019)  •         Food Allergies: none noted per chart  •         Weight History PTA: usual body weight of 125-130 lbs reported per previous RD note. Per [facility]: 121.4 lbs (10/31/18, standing) 124.3 lbs (5/11/19, bed). Weight this admission; 124.3 lbs, now down to 117.5 lbs (7/9/19)  ?Weight loss this admission in context of diuresis.  Lasix noted.  Continue to monitor weight trend. Standing weights, as able.  INFORMATION THIS ADMISSION  •         Last BM: 7/9/19 - bowel regimen ordered (Miralax, senna, colace)  •         Other Subjective Information: Limited participation from Pt and no family present at bedside.  Patient reports to RD that she likes oatmeal for breakfast.  RN states Pt ate eggs and oatmeal for breakfast this morning. Noted history of poor appetite per previous RD note; Glucerna ordered last admission. Trial x1/day this admission.  Abdominal pain noted per chart in context of ?bowel edema vs ischemia.  KUB on 7/10 negative.  RN states Pt with neck pain today. Consider low fiber diet.  Hyperglycemia despite consistent carbohydrate diet - consider consult to endocrinology.     •         Therapeutic Diet Education Provided: RD remains available PRN - Pt with limited participation at this time, unable to reinforce diet education. INFORMATION PTA  Source: RN, Patient, electronic medical record  Patient Ukrainian speaking, per chart.  Some command of English, per RN.  Patient declined use of  phone during RD visit, limited subjective information (Pt closed eyes during interview).  •         Diet PTA: Per previous RD note, Pt Demetri; lou requested last admission; Pt notes to RD today prefers Halal diet this admission.  Per transfer chart, Pt ordered for KEVIN, NCS, no pork at rehab.  No po supplement noted.  •         Nutrition Status PTA: unable to assess  •         Nutrition Supplements PTA: noted B-complex vitamins inconsistently per previous RD note (June 2019)  •         Food Allergies: none noted per chart  •         Weight History PTA: usual body weight of 125-130 lbs reported per previous RD note. Per [facility]: 121.4 lbs (10/31/18, standing) 124.3 lbs (5/11/19, bed). Weight this admission; 124.3 lbs, now down to 117.5 lbs (7/9/19)  ?Weight loss this admission in context of diuresis.  Lasix noted.  Continue to monitor weight trend. Standing weights, as able.  INFORMATION THIS ADMISSION  •         Last BM: 7/9/19 - bowel regimen ordered (Miralax, senna, colace)  •         Other Subjective Information: Limited participation from Pt and no family present at bedside.  Patient reports to RD that she likes oatmeal for breakfast.  RN states Pt ate eggs and oatmeal for breakfast this morning. Noted history of poor appetite per previous RD note; Glucerna ordered last admission. Trial x1/day this admission.  Abdominal pain noted per chart in context of ?bowel edema vs ischemia.  KUB on 7/10 negative.  RN states Pt with neck pain today. Consider low fiber diet.  Hyperglycemia despite consistent carbohydrate diet - consider consult to endocrinology.     •         Therapeutic Diet Education Provided: RD remains available PRN - Pt with limited participation at this time, unable to reinforce diet education.

## 2019-07-11 NOTE — DIETITIAN INITIAL EVALUATION ADULT. - REASON INDICATOR FOR ASSESSMENT
Initial nutrition assessment for ICU length of stay  Per chart:72 yo woman with PMHx of HTN, T2DM, CAD s/p CABG  non-dilated ICM (EF 20-25%), severe mitral regurgitation s/p mitral clip, severe pulm HTN, CKD, hypothyroidism, who is presenting to ED [facility] Transferred to CCU for further management. Undergoing diuresis, with improved hemodynamics

## 2019-07-11 NOTE — PROGRESS NOTE ADULT - SUBJECTIVE AND OBJECTIVE BOX
====================  CCU MIDNIGHT ROUNDS  ====================    SELVIN CLAIRE  [id number]  Patient is a 73y old  Female who presents with a chief complaint of Hypoxia, SOB (11 Jul 2019 00:08)      ====================  SUMMARY:  ====================        ====================  NEW EVENTS:  ====================        ====================  VITALS (Last 12 hrs):  ====================    T(C): 36.9 (07-10-19 @ 22:00), Max: 36.9 (07-10-19 @ 22:00)  T(F): 98.4 (07-10-19 @ 22:00), Max: 98.4 (07-10-19 @ 22:00)  HR: 92 (07-11-19 @ 00:00) (80 - 92)  BP: 104/55 (07-11-19 @ 00:00) (103/50 - 134/57)  BP(mean): 73 (07-11-19 @ 00:00) (66 - 93)  ABP: --  ABP(mean): --  RR: 24 (07-11-19 @ 00:00) (19 - 26)  SpO2: 99% (07-11-19 @ 00:00) (98% - 100%)  Wt(kg): --  CVP(mm Hg): 2 (07-11-19 @ 00:00) (2 - 11)  CVP(cm H2O): --  CO: --  CI: --  PA: 56/15 (07-11-19 @ 00:00) (51/16 - 67/25)  PA(mean): 32 (07-11-19 @ 00:00) (27 - 38)  PCWP: --  SVR: --  PVR: --    I&O's Summary    09 Jul 2019 07:01  -  10 Jul 2019 07:00  --------------------------------------------------------  IN: 1554.2 mL / OUT: 3180 mL / NET: -1625.8 mL    10 Jul 2019 07:01  -  11 Jul 2019 00:14  --------------------------------------------------------  IN: 806.1 mL / OUT: 3310 mL / NET: -2503.9 mL            ====================  NEW LABS:  ====================                          9.0    14.6  )-----------( 376      ( 10 Jul 2019 22:05 )             26.6     07-10    137  |  98  |  52<H>  ----------------------------<  169<H>  4.3   |  21<L>  |  2.18<H>    Ca    8.2<L>      10 Jul 2019 22:05  Phos  1.5     07-10  Mg     2.3     07-10    TPro  7.8  /  Alb  3.9  /  TBili  0.6  /  DBili  x   /  AST  596<H>  /  ALT  778<H>  /  AlkPhos  156<H>  07-10            Blood Gas Source, Mixed: Mixed Blood Gas (07-10-19 @ 21:56)  Blood Gas Source, Mixed: Mixed Blood Gas (07-10-19 @ 15:33)  Blood Gas Source, Mixed: Mixed Blood Gas (07-10-19 @ 04:50)      ====================  PLAN:  ====================  -       [facility] DNP  CCU/Cardiology  [id number]  Beeper #[id number]

## 2019-07-11 NOTE — PROGRESS NOTE ADULT - ASSESSMENT
A/P:      Pulm  No active issues    Cardiac  No active issues    GI  No active issues    Neuro  No active issues    Renal  No active issues    Endo  No active issues    Heme  No active issues    PPx: HSQ; Protonix;   FEN: none; replete electrolytes PRN; NPO  Code status: Full      Dispo: c/w care on ICU  Oscar:  Access/Lines: A/P:      Miss Gamino is 72 yo woman with PMHx of HTN, T2DM, CAD s/p CABG (LIMA to LAD, SVG to OM, SVG to PDA 2014 at [facility]), non-dilated ICM (EF 20-25%), severe mitral regurgitation s/p mitral clip (6/13/19 Dr. Newman), severe pulm HTN, CKD, hypothyroidism, who is presenting to ED after experiencing worsening SOB and intermittent chest pain at [facility]. In ED, pt was placed on BiPAP and given lasix 40mg IVPx1.  Transferred to CCU for further management. Undergoing diuresis, with improved hemodynamics    Cardiac  Acute decompensated heart failure/volume   -TTE 7/5 showed EF 15-20% with global LV systolic dysfunction  -lasix gtt 10  -Strict I/O, trend weights  -Cardiac enzymes plateau'd  -structural heart disease consulted    Cardioprotection  -ASA, brilinta, statin  -d/c'd lopressor 12.5  -DM management as below  -to trend bp's    Hemodynamics  -uptitrating hydralizine to 20 to lower SVR  -d'c;d vaso as elevated SVR point's to elevated afterload  -d/c'd beta blocker as above  -to monitor bp, if can tolerate to start on hydralizine 10mg o/n  -continue to monitor hemodynamics q4    EP  -now NSR  -started digoxin load  -EP consulted, no amiodarone 2/2 uptrending LFTs  -outpt followup for ICD implant for primary prevention  -On tele  -Continue to monitor  -Keep K>4 and Mg>2  -replete electrolytes as needed    Pulmonary  -acute respiratory distress 2/2 to ADHF, resolved  -off bi-pap  -sating well on room air  -continue to trend SpO2    Endo  DM, hyperglycemia  -Hgb A1C 7.4  -started Lantus 25U at bedtime  -started ISS pre meal and at bedtime  -Started lispro 10U pre meal    Hypothyroidism  -On synthroid  -TSH 2.0  -T4, Serum: 6.8 ug/dL (06.12.19 @ 15:17)    Renal, CKD IV   -New baseline of 1.8-2.0 as per nephro  -Uptrending Cr, likely pre-renal d/t low CO/CI  -anticipating to improve with improving hemodynamics  -To trend renal function    GI  -abdominal pain  -likely 2/2 to bowel edema/ischemia 2/2 to low CO/CI  -KUB (-) for acute abdominal pathology  -transaminitis, also likely 2/2 to low output state now downtrending  -anticipate improvement with improving hemodynamics  -to continue to trend    ID  -afebrile  -WBCs downtrending 18.6 -> 13.8  -Pan Cx ordered  -UCx grew ESBL, sensitive to penems, TMP-Sulfa, NFT  -Started vanc/zosyn (day 2)  -d/c'd zosyn as resistant  -started meropenem 500 BID  -BCx isolated no organisms  -ID consulted. To follow recs    Heme  -anemia, chronic  -b/l appx 8-9  -to trend    PPx: HSQ  FEN: none; replete electrolytes PRN; NPO  Code status: Full      Dispo: c/w care on ICU  Code status: Full      Dispo: c/w care on ICU  Oscar:  Access/Lines: A/P:    Miss Gamino is 74 yo woman with PMHx of HTN, T2DM, CAD s/p CABG (LIMA to LAD, SVG to OM, SVG to PDA 2014 at [facility]), non-dilated ICM (EF 20-25%), severe mitral regurgitation s/p mitral clip (6/13/19 Dr. Newman), severe pulm HTN, CKD, hypothyroidism, who is presenting to ED after experiencing worsening SOB and intermittent chest pain at [facility]. In ED, pt was placed on BiPAP and given lasix 40mg IVPx1. Transferred to CCU for further management. Undergoing diuresis, with improved hemodynamics. Found to have a leukocytosis, thought to be reactive.     Cardiac  Acute decompensated heart failure/volume   -TTE 7/5 showed EF 15-20% with global LV systolic dysfunction  -lasix gtt titrated to 5. to switch to lasix 60 IV BID if good UOP   -Strict I/O, trend weights  -Cardiac enzymes plateau'd  -structural heart disease consulted    Cardioprotection  -ASA, brilinta, statin  -d/c'd lopressor 12.5  -off ACE 2/2 pressures  -DM management as below  -to trend bp's    Hemodynamics  -uptitrated hydralizine to 30 to lower SVR  -d'c;d vaso as elevated SVR point's to elevated afterload  -d/c'd beta blocker as above  -continue to monitor hemodynamics q4    EP  -now NSR, no SVTs  -dig 0.125 po  -EP consulted, no amiodarone 2/2 uptrending LFTs  -outpt followup for ICD implant for primary prevention  -On tele  -Continue to monitor  -Keep K>4 and Mg>2  -replete electrolytes as needed    Pulmonary  -acute respiratory distress 2/2 to ADHF, resolved  -off bi-pap  -sating well on room air  -continue to trend SpO2    Endo  DM, hyperglycemia  -Hgb A1C 7.4  -started Lantus 25U at bedtime  -started ISS pre meal and at bedtime  -Started lispro 10U pre meal    Hypothyroidism  -On synthroid  -TSH 2.0  -T4, Serum: 6.8 ug/dL (06.12.19 @ 15:17)    Renal, CKD IV   -New baseline of 1.8-2.0 as per nephro  -Cr starting to downtrend  -improving given improved hemodynamics  -To trend renal function and avoid nephrotoxic meds    GI  -had abdominal pain, now resolved  -likely 2/2 to bowel edema/ischemia 2/2 to low CO/CI  -KUB (-) for acute abdominal pathology  -transaminitis, also likely 2/2 to low output state, continuing to downtrending  -anticipate improvement with improving hemodynamics  -to continue to trend    ID  -afebrile  -WBCs downtrending   -Feliciano Cx ordered  -UCx grew ESBL, sensitive to penems, TMP-Sulfa, NFT  -Noted to have grown ESBL in previous hospitalization (6/13)  -BCx NGTD  -Started vanc/zosyn (day 2)  -d/c'd zosyn as resistant  -started meropenem 500 BID  -ID consulted. leukocytosis = reactionary  -D/c'd abx given pt is afebrile, leukocytosis resolving, no localizing s&s  -to monitor off abx    Heme  -anemia, chronic  -b/l appx 8-9  -to trend    PPx: HSQ  FEN: none; replete electrolytes PRN; NPO  Code status: Full      Dispo: c/w care on ICU

## 2019-07-11 NOTE — DIETITIAN INITIAL EVALUATION ADULT. - ENERGY NEEDS
ht: 59 inches (per 6/2019 admission, 51 inches noted now, ?accuracy), weight: 122.1 lbs, BMI: 24.2 kg/m2, IBW: 112 lbs (+/- 10%) for BMI 23, %IBW: 109%

## 2019-07-11 NOTE — PROGRESS NOTE ADULT - SUBJECTIVE AND OBJECTIVE BOX
====================  CCU MIDNIGHT ROUNDS  ====================    SELVIN CLAIRE  [id number]  Patient is a 73y old  Female who presents with a chief complaint of Hypoxia, SOB (10 Jul 2019 19:40)      ====================  SUMMARY:  ====================        ====================  NEW EVENTS:  ====================        ====================  VITALS (Last 12 hrs):  ====================    T(C): 36.9 (07-10-19 @ 22:00), Max: 36.9 (07-10-19 @ 22:00)  T(F): 98.4 (07-10-19 @ 22:00), Max: 98.4 (07-10-19 @ 22:00)  HR: 92 (07-10-19 @ 23:00) (80 - 92)  BP: 108/56 (07-10-19 @ 23:00) (103/50 - 134/57)  BP(mean): 71 (07-10-19 @ 23:00) (66 - 93)  ABP: --  ABP(mean): --  RR: 22 (07-10-19 @ 23:00) (19 - 26)  SpO2: 99% (07-10-19 @ 23:00) (98% - 100%)  Wt(kg): --  CVP(mm Hg): 3 (07-10-19 @ 23:00) (2 - 11)  CVP(cm H2O): --  CO: --  CI: --  PA: 56/16 (07-10-19 @ 23:00) (51/16 - 67/25)  PA(mean): 31 (07-10-19 @ 23:00) (27 - 38)  PCWP: --  SVR: --  PVR: --    I&O's Summary    09 Jul 2019 07:01  -  10 Jul 2019 07:00  --------------------------------------------------------  IN: 1554.2 mL / OUT: 3180 mL / NET: -1625.8 mL    10 Jul 2019 07:01  -  11 Jul 2019 00:09  --------------------------------------------------------  IN: 792.8 mL / OUT: 3210 mL / NET: -2417.2 mL            ====================  NEW LABS:  ====================                          9.0    14.6  )-----------( 376      ( 10 Jul 2019 22:05 )             26.6     07-10    137  |  98  |  52<H>  ----------------------------<  169<H>  4.3   |  21<L>  |  2.18<H>    Ca    8.2<L>      10 Jul 2019 22:05  Phos  1.5     07-10  Mg     2.3     07-10    TPro  7.8  /  Alb  3.9  /  TBili  0.6  /  DBili  x   /  AST  596<H>  /  ALT  778<H>  /  AlkPhos  156<H>  07-10            Blood Gas Source, Mixed: Mixed Blood Gas (07-10-19 @ 21:56)  Blood Gas Source, Mixed: Mixed Blood Gas (07-10-19 @ 15:33)  Blood Gas Source, Mixed: Mixed Blood Gas (07-10-19 @ 04:50)      ====================  PLAN:  ====================  -       [facility] DNP  CCU/Cardiology  [id number]  Beeper #[id number] ====================  CCU MIDNIGHT ROUNDS  ====================    SELVIN CLAIRE  [id number]  Patient is a 73y old  Female who presents with a chief complaint of Hypoxia, SOB (10 Jul 2019 19:40)      ====================  SUMMARY:  ====================        ====================  NEW EVENTS:  ====================  No new events     ====================  VITALS (Last 12 hrs):  ====================    T(C): 36.9 (07-10-19 @ 22:00), Max: 36.9 (07-10-19 @ 22:00)  T(F): 98.4 (07-10-19 @ 22:00), Max: 98.4 (07-10-19 @ 22:00)  HR: 92 (07-10-19 @ 23:) (80 - 92)  BP: 108/56 (07-10-19 @ 23:) (103/50 - 134/57)  BP(mean): 71 (07-10-19 @ 23:00) (66 - 93)  ABP: --  ABP(mean): --  RR: 22 (07-10-19 @ 23:) (19 - 26)  SpO2: 99% (07-10-19 @ 23:) (98% - 100%)  Wt(kg): --  CVP(mm Hg): 3 (07-10-19 @ 23:) (2 - 11)  CVP(cm H2O): --  CO: --  CI: --  PA: 56/16 (07-10-19 @ 23:00) (51/16 - 67/25)  PA(mean): 31 (07-10-19 @ 23:) (27 - 38)  PCWP: --  SVR: --  PVR: --    I&O's Summary    2019 07:  -  10 Jul 2019 07:00  --------------------------------------------------------  IN: 1554.2 mL / OUT: 3180 mL / NET: -1625.8 mL    10 Jul 2019 07:  -  2019 00:09  --------------------------------------------------------  IN: 792.8 mL / OUT: 3210 mL / NET: -2417.2 mL            ====================  NEW LABS:  ====================                          9.0    14.6  )-----------( 376      ( 10 Jul 2019 22:05 )             26.6     07-10    137  |  98  |  52<H>  ----------------------------<  169<H>  4.3   |  21<L>  |  2.18<H>    Ca    8.2<L>      10 Jul 2019 22:05  Phos  1.5     07-10  Mg     2.3     07-10    TPro  7.8  /  Alb  3.9  /  TBili  0.6  /  DBili  x   /  AST  596<H>  /  ALT  778<H>  /  AlkPhos  156<H>  07-10            Blood Gas Source, Mixed: Mixed Blood Gas (07-10-19 @ 21:56)  Blood Gas Source, Mixed: Mixed Blood Gas (07-10-19 @ 15:33)  Blood Gas Source, Mixed: Mixed Blood Gas (07-10-19 @ 04:50)      ====================  PLAN:  ====================  * Acute decompensated HF  - Pt on dobutamine at 5 and Lasix at 10 and hydralazine 20 TID (started last night) ; Cr improving, lactated improving, transaminitis stable, net neg 2.4L   - CO 5.56 (5.17 7/10), CI 4.12 (3.83), SVR 1050 (1083), CVO2 73 (67), lactate 3.6 --> 1.7 --> 1.5  - decreased  to 2.5, Lasix to 5 and uptitrated hydralazine to 30 TID ====================  CCU MIDNIGHT ROUNDS  ====================    SELVIN CLAIRE  [id number]  Patient is a 73y old  Female who presents with a chief complaint of Hypoxia, SOB (10 Jul 2019 19:40)      ====================  SUMMARY:  ================    74 yo F w/ PMHx of HTN, IDDMT2, CAD s/p CABG (LIMA to LAD, SVG to OM, SVG to PDA  at [facility]), non-dilated ICM (EF 20-25%), severe mitral regurgitation s/p mitral clip (19 Dr. Newman), severe pulm HTN & hypothyroidism admitted for acute decompensated HF initially on vaso now on dobutamine w/ [place] in place (lactate was uptrending) and Lasix drip.  CCU course c/b episodes of SVT (resolved w/ Lopressor however BP’s soft; dig loaded on ).  EP consulted and structural heart c/s on . Uptrending WBCs. Pan Cx + for ESBL UTI now on Sol (started 7/10).          ====================  NEW EVENTS:  ====================  No new events     ====================  VITALS (Last 12 hrs):  ====================    T(C): 36.9 (07-10-19 @ 22:00), Max: 36.9 (07-10-19 @ 22:00)  T(F): 98.4 (07-10-19 @ 22:00), Max: 98.4 (07-10-19 @ 22:00)  HR: 92 (07-10-19 @ 23:00) (80 - 92)  BP: 108/56 (07-10-19 @ 23:00) (103/50 - 134/57)  BP(mean): 71 (07-10-19 @ 23:00) (66 - 93)  ABP: --  ABP(mean): --  RR: 22 (07-10-19 @ 23:) (19 - 26)  SpO2: 99% (07-10-19 @ 23:) (98% - 100%)  Wt(kg): --  CVP(mm Hg): 3 (07-10-19 @ 23:00) (2 - 11)  CVP(cm H2O): --  CO: --  CI: --  PA: 56/16 (07-10-19 @ 23:00) (51/16 - 67/25)  PA(mean): 31 (07-10-19 @ 23:00) (27 - 38)  PCWP: --  SVR: --  PVR: --    I&O's Summary    2019 07:  -  10 Jul 2019 07:00  --------------------------------------------------------  IN: 1554.2 mL / OUT: 3180 mL / NET: -1625.8 mL    10 Jul 2019 07:  -  2019 00:09  --------------------------------------------------------  IN: 792.8 mL / OUT: 3210 mL / NET: -2417.2 mL            ====================  NEW LABS:  ====================                          9.0    14.6  )-----------( 376      ( 10 Jul 2019 22:05 )             26.6     07-10    137  |  98  |  52<H>  ----------------------------<  169<H>  4.3   |  21<L>  |  2.18<H>    Ca    8.2<L>      10 Jul 2019 22:05  Phos  1.5     07-10  Mg     2.3     07-10    TPro  7.8  /  Alb  3.9  /  TBili  0.6  /  DBili  x   /  AST  596<H>  /  ALT  778<H>  /  AlkPhos  156<H>  07-10            Blood Gas Source, Mixed: Mixed Blood Gas (07-10-19 @ 21:56)  Blood Gas Source, Mixed: Mixed Blood Gas (07-10-19 @ 15:33)  Blood Gas Source, Mixed: Mixed Blood Gas (07-10-19 @ 04:50)      ====================  PLAN:  ====================  * Acute decompensated HF  - Pt on dobutamine at 5 and Lasix at 10 and hydralazine 20 TID (started last night) ; Cr improving, lactated improving, transaminitis stable, net neg 2.4L   - CO 5.56 (5.17 7/10), CI 4.12 (3.83), SVR 1050 (1083), CVO2 73 (67), lactate 3.6 --> 1.7 --> 1.5  - decreased  to 2.5, Lasix to 5 and uptitrated hydralazine to 30 TID

## 2019-07-11 NOTE — DIETITIAN INITIAL EVALUATION ADULT. - PERTINENT LABORATORY DATA
BUN 49, Cr 2.24, Phos 1.5-->4.1 (today), K+ 4.1-->4.3-->5.1 (x3 days), HgbA1c 7.8% (6/1/19), 7.4% (7/5/19), , 271

## 2019-07-11 NOTE — PROGRESS NOTE ADULT - ASSESSMENT
Patient is a 73 year old female with past medical history of HTN, HLD, DMT2, pulmonary HTN, CKD, CAD s/p CABG in 2014, last stent Nov 2018, ICM, EF 20- 25%, severe mitral regurgitation s/p mitral clip on 6/13/19 with Dr. Valera and hypothyroidism presented to ED with c/o SOB and chest pain from [facility]. Currently admitted with acute decompensated heart failure of lasix and dobutamine drip. Overnight pt noted to have episode of SVT 's, lasting approximately 2 hours, treated with Amio 150mg IVP and lopressor 5mg IV x 1 then became bradycardiac with HR 40's. Started on digoxin load.  EP consulted for further management of SVT.      # SVT  # Decompensated HF  - ECHO: EF 21%, severe global left ventricular systolic dysfunction,   - Tele: SR HR 80- 90's PVCs no overnight events noted. No further SVT noted x 48 hours.   - Currently with IV diuresis for HF per CCU team management   - Unable to start Amiodarone at this time due to elevated LFT ( , )  - EP team signing off, recall if needed. Pt may follow up as an outpatient for ICD implant for primary prevention with Dr. Miguel A Uriarte ([facility])     [id number]

## 2019-07-11 NOTE — DIETITIAN INITIAL EVALUATION ADULT. - ADD RECOMMEND
1) Low fiber (?abd pain), low sodium, consistent carbohydrate with evening snack. Pt Halal per previous RD note, requested Mesfin.  Add this admission. 2) Trial Glucerna x1/day (220 kcal, 10 gm protein) - encourage when po from food low.  3) RD remains available PRN for education.  4) 1) Low fiber (?abd pain), low sodium, consistent carbohydrate with evening snack. Halal per Pt request. 2) Trial Glucerna x1/day (220 kcal, 10 gm protein) - encourage when po from food low.  3) RD remains available PRN for education.  4) Monitor %po intake, discussed with RN.

## 2019-07-11 NOTE — PROGRESS NOTE ADULT - SUBJECTIVE AND OBJECTIVE BOX
====================  CCU MIDNIGHT ROUNDS  ====================    SELVIN CLAIRE  [id number]  Patient is a 73y old  Female who presents with a chief complaint of Hypoxia, SOB (11 Jul 2019 16:54)      ====================  SUMMARY:  ====================        ====================  NEW EVENTS:  ====================        ====================  VITALS (Last 12 hrs):  ====================    T(C): 37 (07-11-19 @ 21:00), Max: 37.1 (07-11-19 @ 15:00)  T(F): 98.6 (07-11-19 @ 21:00), Max: 98.8 (07-11-19 @ 15:00)  HR: 74 (07-11-19 @ 21:00) (74 - 88)  BP: 100/51 (07-11-19 @ 21:00) (87/46 - 108/46)  BP(mean): 75 (07-11-19 @ 21:00) (67 - 84)  ABP: --  ABP(mean): --  RR: 22 (07-11-19 @ 21:00) (14 - 22)  SpO2: 100% (07-11-19 @ 21:00) (98% - 100%)  Wt(kg): --  CVP(mm Hg): 4 (07-11-19 @ 21:00) (3 - 5)  CVP(cm H2O): --  CO: --  CI: --  PA: 47/15 (07-11-19 @ 21:00) (47/15 - 59/19)  PA(mean): 27 (07-11-19 @ 21:00) (27 - 37)  PCWP: --  SVR: --  PVR: --    I&O's Summary    10 Jul 2019 07:01  -  11 Jul 2019 07:00  --------------------------------------------------------  IN: 1143 mL / OUT: 4125 mL / NET: -2982 mL    11 Jul 2019 07:01  -  11 Jul 2019 22:52  --------------------------------------------------------  IN: 323.4 mL / OUT: 725 mL / NET: -401.6 mL            ====================  NEW LABS:  ====================                          9.2    12.8  )-----------( 362      ( 11 Jul 2019 21:23 )             27.6     07-11    138  |  99  |  52<H>  ----------------------------<  123<H>  4.3   |  20<L>  |  2.42<H>    Ca    8.2<L>      11 Jul 2019 21:23  Phos  2.0     07-11  Mg     2.2     07-11    TPro  7.6  /  Alb  3.7  /  TBili  0.7  /  DBili  x   /  AST  345<H>  /  ALT  620<H>  /  AlkPhos  151<H>  07-11            Blood Gas Source, Mixed: Mixed Blood Gas (07-11-19 @ 21:17)  Blood Gas Source, Mixed: Mixed Blood Gas (07-11-19 @ 12:41)  Blood Gas Source, Mixed: Mixed Blood Gas (07-11-19 @ 03:17)      ====================  PLAN:  ====================  -       Teresita Nichole DNP  CCU/Cardiology  [id number]  Beeper #[id number] ====================  CCU MIDNIGHT ROUNDS  ====================    SELVIN CLAIRE  [id number]  Patient is a 73y old  Female who presents with a chief complaint of Hypoxia, SOB (11 Jul 2019 16:54)      ====================  SUMMARY:  ====================  Miss Claire is 72 yo woman with PMHx of HTN, T2DM, CAD s/p CABG (LIMA to LAD, SVG to OM, SVG to PDA 2014 at [facility]), non-dilated ICM (EF 20-25%), severe mitral regurgitation s/p mitral clip (6/13/19 Dr. Newman), severe pulm HTN, CKD, hypothyroidism, who is presenting to ED [facility], pt was placed on BiPAP and given lasix 40mg IVPx1. Transferred to CCU for further management. Undergoing diuresis, with improved hemodynamics. Found to have a leukocytosis, thought to be reactive.     ====================  NEW EVENTS:  ====================  Pts MVO2 trend 66 --> 59 --> 58 (off dobutamine)   Net neg 400cc; still on lasix drip at 5      ====================  VITALS (Last 12 hrs):  ====================    T(C): 37 (07-11-19 @ 21:00), Max: 37.1 (07-11-19 @ 15:00)  T(F): 98.6 (07-11-19 @ 21:00), Max: 98.8 (07-11-19 @ 15:00)  HR: 74 (07-11-19 @ 21:00) (74 - 88)  BP: 100/51 (07-11-19 @ 21:00) (87/46 - 108/46)  BP(mean): 75 (07-11-19 @ 21:00) (67 - 84)  ABP: --  ABP(mean): --  RR: 22 (07-11-19 @ 21:00) (14 - 22)  SpO2: 100% (07-11-19 @ 21:00) (98% - 100%)  Wt(kg): --  CVP(mm Hg): 4 (07-11-19 @ 21:00) (3 - 5)  CVP(cm H2O): --  CO: --  CI: --  PA: 47/15 (07-11-19 @ 21:00) (47/15 - 59/19)  PA(mean): 27 (07-11-19 @ 21:00) (27 - 37)  PCWP: --  SVR: --  PVR: --    I&O's Summary    10 Jul 2019 07:01  -  11 Jul 2019 07:00  --------------------------------------------------------  IN: 1143 mL / OUT: 4125 mL / NET: -2982 mL    11 Jul 2019 07:01  -  11 Jul 2019 22:52  --------------------------------------------------------  IN: 323.4 mL / OUT: 725 mL / NET: -401.6 mL            ====================  NEW LABS:  ====================                          9.2    12.8  )-----------( 362      ( 11 Jul 2019 21:23 )             27.6     07-11    138  |  99  |  52<H>  ----------------------------<  123<H>  4.3   |  20<L>  |  2.42<H>    Ca    8.2<L>      11 Jul 2019 21:23  Phos  2.0     07-11  Mg     2.2     07-11    TPro  7.6  /  Alb  3.7  /  TBili  0.7  /  DBili  x   /  AST  345<H>  /  ALT  620<H>  /  AlkPhos  151<H>  07-11            Blood Gas Source, Mixed: Mixed Blood Gas (07-11-19 @ 21:17)  Blood Gas Source, Mixed: Mixed Blood Gas (07-11-19 @ 12:41)  Blood Gas Source, Mixed: Mixed Blood Gas (07-11-19 @ 03:17)      ====================  PLAN:  ====================    Cardiac  Acute decompensated heart failure/volume   -TTE 7/5 showed EF 15-20% with global LV systolic dysfunction  -lasix gtt titrated to 5. to switch to lasix 60 IV BID if good UOP   -Strict I/O, trend weights  -Cardiac enzymes plateau'd  -structural heart disease consulted    Cardioprotection  -ASA, brilinta, statin  -d/c'd lopressor 12.5  -off ACE 2/2 pressures  -DM management as below  -to trend bp's    Hemodynamics  -uptitrated hydralizine to 30 to lower SVR  -d'c;d vaso as elevated SVR point's to elevated afterload  -d/c'd beta blocker as above  -continue to monitor hemodynamics q4    EP  -now NSR, no SVTs  -dig 0.125 po  -EP consulted, no amiodarone 2/2 uptrending LFTs  -outpt followup for ICD implant for primary prevention  -On tele  -Continue to monitor  -Keep K>4 and Mg>2  -replete electrolytes as needed    Pulmonary  -acute respiratory distress 2/2 to ADHF, resolved  -off bi-pap  -sating well on room air  -continue to trend SpO2    Endo  DM, hyperglycemia  -Hgb A1C 7.4  -started Lantus 25U at bedtime  -started ISS pre meal and at bedtime  -Started lispro 10U pre meal    Hypothyroidism  -On synthroid  -TSH 2.0  -T4, Serum: 6.8 ug/dL (06.12.19 @ 15:17)    Renal, CKD IV   -New baseline of 1.8-2.0 as per nephro  -Cr starting to downtrend  -improving given improved hemodynamics  -To trend renal function and avoid nephrotoxic meds    GI  -had abdominal pain, now resolved  -likely 2/2 to bowel edema/ischemia 2/2 to low CO/CI  -KUB (-) for acute abdominal pathology  -transaminitis, also likely 2/2 to low output state, continuing to downtrending  -anticipate improvement with improving hemodynamics  -to continue to trend    ID  -afebrile  -WBCs downtrending   -Feliciano Cx ordered  -UCx grew ESBL, sensitive to penems, TMP-Sulfa, NFT  -Noted to have grown ESBL in previous hospitalization (6/13)  -BCx NGTD  -Started vanc/zosyn (day 2)  -d/c'd zosyn as resistant  -started meropenem 500 BID  -ID consulted. leukocytosis = reactionary  -D/c'd abx given pt is afebrile, leukocytosis resolving, no localizing s&s  -to monitor off abx    Heme  -anemia, chronic  -b/l appx 8-9  -to trend    PPx: HSQ  FEN: none; replete electrolytes PRN; NPO  Code status: Full      Dispo: c/w care on ICU ====================  CCU MIDNIGHT ROUNDS  ====================    SELVIN CLAIRE  [id number]  Patient is a 73y old  Female who presents with a chief complaint of Hypoxia, SOB (11 Jul 2019 16:54)      ====================  SUMMARY:  ====================  Miss Claire is 72 yo woman with PMHx of HTN, T2DM, CAD s/p CABG (LIMA to LAD, SVG to OM, SVG to PDA 2014 at [facility]), non-dilated ICM (EF 20-25%), severe mitral regurgitation s/p mitral clip (6/13/19 Dr. Newman), severe pulm HTN, CKD, hypothyroidism, who is presenting to ED [facility], pt was placed on BiPAP and given lasix 40mg IVPx1. Transferred to CCU for further management. Undergoing diuresis, with improved hemodynamics. Found to have a leukocytosis, thought to be reactive.     ====================  NEW EVENTS:  ====================  Pts MVO2 trend 66 --> 59 --> 58 (off dobutamine)   Net neg 400cc; still on lasix drip at 5      ====================  VITALS (Last 12 hrs):  ====================    T(C): 37 (07-11-19 @ 21:00), Max: 37.1 (07-11-19 @ 15:00)  T(F): 98.6 (07-11-19 @ 21:00), Max: 98.8 (07-11-19 @ 15:00)  HR: 74 (07-11-19 @ 21:00) (74 - 88)  BP: 100/51 (07-11-19 @ 21:00) (87/46 - 108/46)  BP(mean): 75 (07-11-19 @ 21:00) (67 - 84)  ABP: --  ABP(mean): --  RR: 22 (07-11-19 @ 21:00) (14 - 22)  SpO2: 100% (07-11-19 @ 21:00) (98% - 100%)  Wt(kg): --  CVP(mm Hg): 4 (07-11-19 @ 21:00) (3 - 5)  CVP(cm H2O): --  CO: --  CI: --  PA: 47/15 (07-11-19 @ 21:00) (47/15 - 59/19)  PA(mean): 27 (07-11-19 @ 21:00) (27 - 37)  PCWP: --  SVR: --  PVR: --    I&O's Summary    10 Jul 2019 07:01  -  11 Jul 2019 07:00  --------------------------------------------------------  IN: 1143 mL / OUT: 4125 mL / NET: -2982 mL    11 Jul 2019 07:01  -  11 Jul 2019 22:52  --------------------------------------------------------  IN: 323.4 mL / OUT: 725 mL / NET: -401.6 mL            ====================  NEW LABS:  ====================                          9.2    12.8  )-----------( 362      ( 11 Jul 2019 21:23 )             27.6     07-11    138  |  99  |  52<H>  ----------------------------<  123<H>  4.3   |  20<L>  |  2.42<H>    Ca    8.2<L>      11 Jul 2019 21:23  Phos  2.0     07-11  Mg     2.2     07-11    TPro  7.6  /  Alb  3.7  /  TBili  0.7  /  DBili  x   /  AST  345<H>  /  ALT  620<H>  /  AlkPhos  151<H>  07-11            Blood Gas Source, Mixed: Mixed Blood Gas (07-11-19 @ 21:17)  Blood Gas Source, Mixed: Mixed Blood Gas (07-11-19 @ 12:41)  Blood Gas Source, Mixed: Mixed Blood Gas (07-11-19 @ 03:17)      ====================  PLAN:  ====================    Cardiac  Acute decompensated heart failure/volume   -TTE 7/5 showed EF 15-20% with global LV systolic dysfunction  -lasix gtt titrated to 5; plan was to switch to lasix 60 IV BID if good UOP however o/n pt -400cc/24h; c/w lasix drip; bladder scan showed ~ 200cc  -Strict I/O, trend weights  -Cardiac enzymes plateau'd  -structural heart disease consulted  - dobutamine d/c'd today; CI, CO decreasing, MVOD 66 --> 55 --> 58; lactate 1.0 --> 1.6; overnight held off on restarting; f/u numbers in AM   - gave an extra 10 Hydralazine to make total dose 40 in PM; if BP tolerates can uptitrate to 50 TID in AM   - continue to hold BB and ACE 2/2 BPs

## 2019-07-11 NOTE — CHART NOTE - NSCHARTNOTEFT_GEN_A_CORE
CCU Fellow:    73 F w/ CAD s/p CABG, non-dilated ICM (EF 20-25%), severe mitral regurgitation s/p mitral clip (6/13/19), severe pulm HTN, hypothyroidism a/w ADHF. CVP 3-4, CI 4.12, ZHO=7447. Will decrease lasix from 10mg/hr to 5mg/hr due to concern for overdiuresis and dobutamine from 5mg to 2.5mg and repeat numbers.     Lui Smith

## 2019-07-11 NOTE — PROGRESS NOTE ADULT - ATTENDING COMMENTS
Patient is seen and examined with fellow, NP and the CCU house-staff. I agree with the history, physical and the assessment and plan.  no ectipy overnight  will wean down the inotropic support  will wean down the klasi drip

## 2019-07-11 NOTE — DIETITIAN INITIAL EVALUATION ADULT. - PHYSICAL APPEARANCE
other (specify) Skin per nursing documentation: no pressure injuries noted  Edema: none noted today  nutrition focused physical exam deferred - unable to obtain consent / limited view due to icu setting/equipment. Skin per nursing documentation: no pressure injuries noted (confirmed with RN)  Edema: none noted today  nutrition focused physical exam deferred - unable to obtain consent / limited view due to icu setting/equipment.

## 2019-07-11 NOTE — PROGRESS NOTE ADULT - SUBJECTIVE AND OBJECTIVE BOX
PATIENT:  SELVIN CLAIRE  [id number]    CHIEF COMPLAINT:  Patient is a 73y old  Female who presents with a chief complaint of Hypoxia, SOB (2019 00:08)      INTERVAL HISTORYOVERNIGHT EVENTS:      REVIEW OF SYSTEMS:    Constitutional:     [ ] negative [ ] fevers [ ] chills [ ] weight loss [ ] weight gain  HEENT:                  [ ] negative [ ] dry eyes [ ] eye irritation [ ] postnasal drip [ ] nasal congestion  CV:                         [ ] negative  [ ] chest pain [ ] orthopnea [ ] palpitations [ ] murmur  Resp:                     [ ] negative [ ] cough [ ] shortness of breath [ ] dyspnea [ ] wheezing [ ] sputum [ ] hemoptysis  GI:                          [ ] negative [ ] nausea [ ] vomiting [ ] diarrhea [ ] constipation [ ] abd pain [ ] dysphagia   :                        [ ] negative [ ] dysuria [ ] nocturia [ ] hematuria [ ] increased urinary frequency  Musculoskeletal: [ ] negative [ ] back pain [ ] myalgias [ ] arthralgias [ ] fracture  Skin:                       [ ] negative [ ] rash [ ] itch  Neurological:        [ ] negative [ ] headache [ ] dizziness [ ] syncope [ ] weakness [ ] numbness  Psychiatric:           [ ] negative [ ] anxiety [ ] depression  Endocrine:            [ ] negative [ ] diabetes [ ] thyroid problem  Heme/Lymph:      [ ] negative [ ] anemia [ ] bleeding problem  Allergic/Immune: [ ] negative [ ] itchy eyes [ ] nasal discharge [ ] hives [ ] angioedema    [ ] All other systems negative  [ ] Unable to assess ROS because ________.    MEDICATIONS:  MEDICATIONS  (STANDING):  aspirin enteric coated 81 milliGRAM(s) Oral daily  atorvastatin 40 milliGRAM(s) Oral at bedtime  chlorhexidine 2% Cloths 1 Application(s) Topical daily  dextrose 5%. 1000 milliLiter(s) (50 mL/Hr) IV Continuous <Continuous>  dextrose 50% Injectable 12.5 Gram(s) IV Push once  dextrose 50% Injectable 25 Gram(s) IV Push once  dextrose 50% Injectable 25 Gram(s) IV Push once  digoxin     Tablet 0.125 milliGRAM(s) Oral daily  DOBUTamine Infusion 2.5 MICROgram(s)/kG/Min (4.155 mL/Hr) IV Continuous <Continuous>  docusate sodium 100 milliGRAM(s) Oral two times a day  furosemide Infusion 5 mG/Hr (2.5 mL/Hr) IV Continuous <Continuous>  heparin  Injectable 5000 Unit(s) SubCutaneous every 8 hours  hydrALAZINE 30 milliGRAM(s) Oral every 8 hours  insulin glargine Injectable (LANTUS) 28 Unit(s) SubCutaneous at bedtime  insulin lispro (HumaLOG) corrective regimen sliding scale   SubCutaneous three times a day before meals  insulin lispro (HumaLOG) corrective regimen sliding scale   SubCutaneous at bedtime  insulin lispro Injectable (HumaLOG) 11 Unit(s) SubCutaneous three times a day before meals  levothyroxine 50 MICROGram(s) Oral daily  lidocaine   Patch 1 Patch Transdermal daily  melatonin 3 milliGRAM(s) Oral at bedtime  meropenem  IVPB 500 milliGRAM(s) IV Intermittent every 12 hours  montelukast 10 milliGRAM(s) Oral daily  pantoprazole    Tablet 40 milliGRAM(s) Oral before breakfast  polyethylene glycol 3350 17 Gram(s) Oral daily  senna 2 Tablet(s) Oral at bedtime  ticagrelor 90 milliGRAM(s) Oral every 12 hours    MEDICATIONS  (PRN):  acetaminophen   Tablet .. 650 milliGRAM(s) Oral every 6 hours PRN Mild Pain (1 - 3), Moderate Pain (4 - 6)  dextrose 40% Gel 15 Gram(s) Oral once PRN Blood Glucose LESS THAN 70 milliGRAM(s)/deciliter  glucagon  Injectable 1 milliGRAM(s) IntraMuscular once PRN Glucose LESS THAN 70 milligrams/deciliter      ALLERGIES:  Allergies    azithromycin (Hives; Pruritus)    Intolerances        OBJECTIVE:  ICU Vital Signs Last 24 Hrs  T(C): 36.8 (2019 07:00), Max: 36.9 (10 Jul 2019 22:00)  T(F): 98.2 (2019 07:00), Max: 98.4 (10 Jul 2019 22:00)  HR: 88 (2019 08:00) (76 - 92)  BP: 117/45 (2019 07:30) (91/46 - 134/57)  BP(mean): 72 (2019 07:30) (62 - 93)  ABP: --  ABP(mean): --  RR: 16 (2019 08:00) (16 - 26)  SpO2: 98% (2019 08:00) (95% - 100%)      Adult Advanced Hemodynamics Last 24 Hrs  CVP(mm Hg): 3 (2019 08:00) (1 - 11)  CVP(cm H2O): --  CO: --  CI: --  PA: 56/17 (2019 08:00) (48/12 - 67/25)  PA(mean): 32 (2019 08:00) (24 - 43)  PCWP: --  SVR: --  SVRI: --  PVR: --  PVRI: --  CAPILLARY BLOOD GLUCOSE      POCT Blood Glucose.: 154 mg/dL (10 Jul 2019 21:47)  POCT Blood Glucose.: 157 mg/dL (10 Jul 2019 11:54)    CAPILLARY BLOOD GLUCOSE      POCT Blood Glucose.: 154 mg/dL (10 Jul 2019 21:47)    I&O's Summary    10 Jul 2019 07:  -  2019 07:00  --------------------------------------------------------  IN: 1143 mL / OUT: 4125 mL / NET: -2982 mL    2019 07:01  -  2019 08:07  --------------------------------------------------------  IN: 6.7 mL / OUT: 100 mL / NET: -93.3 mL      Daily     Daily     PHYSICAL EXAMINATION:  General: WN/WD NAD  HEENT: PERRLA, EOMI, moist mucous membranes  Neurology: A&Ox3, nonfocal, CUADRA x 4  Respiratory: CTA B/L, normal respiratory effort, no wheezes, crackles, rales  CV: RRR, S1S2, no murmurs, rubs or gallops  Abdominal: Soft, NT, ND +BS, Last BM  Extremities: No edema, + peripheral pulses  Incisions:   Tubes:    LABS:                          8.8    14.1  )-----------( 374      ( 2019 03:21 )             25.6     07-11    138  |  101  |  49<H>  ----------------------------<  222<H>  5.1   |  21<L>  |  2.24<H>    Ca    8.4      2019 03:21  Phos  4.1       Mg     2.3         TPro  7.7  /  Alb  3.8  /  TBili  0.6  /  DBili  x   /  AST  484<H>  /  ALT  729<H>  /  AlkPhos  149<H>      LIVER FUNCTIONS - ( 2019 03:21 )  Alb: 3.8 g/dL / Pro: 7.7 g/dL / ALK PHOS: 149 U/L / ALT: 729 U/L / AST: 484 U/L / GGT: x                   Urinalysis Basic - ( 2019 13:28 )    Color: Yellow / Appearance: Clear / S.012 / pH: x  Gluc: x / Ketone: Negative  / Bili: Negative / Urobili: Negative   Blood: x / Protein: Trace / Nitrite: Negative   Leuk Esterase: Negative / RBC: 1 /hpf / WBC 1 /HPF   Sq Epi: x / Non Sq Epi: 0 /hpf / Bacteria: Moderate        TELEMETRY:     EKG:     IMAGING: PATIENT:  SELVIN CLAIRE  [id number]    CHIEF COMPLAINT:  Patient is a 73y old  Female who presents with a chief complaint of Hypoxia, SOB (2019 00:08)      INTERVAL HISTORYOVERNIGHT EVENTS:    RUBI o/n. No runs of SVT. No new complaints    REVIEW OF SYSTEMS:    Constitutional:     [x ] negative [ ] fevers [ ] chills [ ] weight loss [ ] weight gain  HEENT:                  [ x] negative [ ] dry eyes [ ] eye irritation [ ] postnasal drip [ ] nasal congestion  CV:                         [x ] negative  [ ] chest pain [ ] orthopnea [ ] palpitations [ ] murmur  Resp:                     [x ] negative [ ] cough [ ] shortness of breath [ ] dyspnea [ ] wheezing [ ] sputum [ ] hemoptysis  GI:                          [x ] negative [ ] nausea [ ] vomiting [ ] diarrhea [ ] constipation [ ] abd pain [ ] dysphagia   :                        [x ] negative [ ] dysuria [ ] nocturia [ ] hematuria [ ] increased urinary frequency  Musculoskeletal: [x ] negative [ ] back pain [ ] myalgias [ ] arthralgias [ ] fracture  Skin:                       [x ] negative [ ] rash [ ] itch  Neurological:        [x ] negative [ ] headache [ ] dizziness [ ] syncope [ ] weakness [ ] numbness    [ ] All other systems negative  [ ] Unable to assess ROS because ________.    MEDICATIONS:  MEDICATIONS  (STANDING):  aspirin enteric coated 81 milliGRAM(s) Oral daily  atorvastatin 40 milliGRAM(s) Oral at bedtime  chlorhexidine 2% Cloths 1 Application(s) Topical daily  dextrose 5%. 1000 milliLiter(s) (50 mL/Hr) IV Continuous <Continuous>  dextrose 50% Injectable 12.5 Gram(s) IV Push once  dextrose 50% Injectable 25 Gram(s) IV Push once  dextrose 50% Injectable 25 Gram(s) IV Push once  digoxin     Tablet 0.125 milliGRAM(s) Oral daily  DOBUTamine Infusion 2.5 MICROgram(s)/kG/Min (4.155 mL/Hr) IV Continuous <Continuous>  docusate sodium 100 milliGRAM(s) Oral two times a day  furosemide Infusion 5 mG/Hr (2.5 mL/Hr) IV Continuous <Continuous>  heparin  Injectable 5000 Unit(s) SubCutaneous every 8 hours  hydrALAZINE 30 milliGRAM(s) Oral every 8 hours  insulin glargine Injectable (LANTUS) 28 Unit(s) SubCutaneous at bedtime  insulin lispro (HumaLOG) corrective regimen sliding scale   SubCutaneous three times a day before meals  insulin lispro (HumaLOG) corrective regimen sliding scale   SubCutaneous at bedtime  insulin lispro Injectable (HumaLOG) 11 Unit(s) SubCutaneous three times a day before meals  levothyroxine 50 MICROGram(s) Oral daily  lidocaine   Patch 1 Patch Transdermal daily  melatonin 3 milliGRAM(s) Oral at bedtime  meropenem  IVPB 500 milliGRAM(s) IV Intermittent every 12 hours  montelukast 10 milliGRAM(s) Oral daily  pantoprazole    Tablet 40 milliGRAM(s) Oral before breakfast  polyethylene glycol 3350 17 Gram(s) Oral daily  senna 2 Tablet(s) Oral at bedtime  ticagrelor 90 milliGRAM(s) Oral every 12 hours    MEDICATIONS  (PRN):  acetaminophen   Tablet .. 650 milliGRAM(s) Oral every 6 hours PRN Mild Pain (1 - 3), Moderate Pain (4 - 6)  dextrose 40% Gel 15 Gram(s) Oral once PRN Blood Glucose LESS THAN 70 milliGRAM(s)/deciliter  glucagon  Injectable 1 milliGRAM(s) IntraMuscular once PRN Glucose LESS THAN 70 milligrams/deciliter      ALLERGIES:  Allergies    azithromycin (Hives; Pruritus)    Intolerances      OBJECTIVE:  ICU Vital Signs Last 24 Hrs  T(C): 36.8 (2019 07:00), Max: 36.9 (10 Jul 2019 22:00)  T(F): 98.2 (2019 07:00), Max: 98.4 (10 Jul 2019 22:00)  HR: 88 (2019 08:00) (76 - 92)  BP: 117/45 (2019 07:30) (91/46 - 134/57)  BP(mean): 72 (2019 07:30) (62 - 93)  ABP: --  ABP(mean): --  RR: 16 (2019 08:00) (16 - 26)  SpO2: 98% (2019 08:00) (95% - 100%)      Adult Advanced Hemodynamics Last 24 Hrs  CVP(mm Hg): 3 (2019 08:00) (1 - 11)  CVP(cm H2O): --  CO: --  CI: --  PA: 56/17 (2019 08:00) (48/12 - 67/25)  PA(mean): 32 (2019 08:00) (24 - 43)  PCWP: --  SVR: --  SVRI: --  PVR: --  PVRI: --  CAPILLARY BLOOD GLUCOSE      POCT Blood Glucose.: 154 mg/dL (10 Jul 2019 21:47)  POCT Blood Glucose.: 157 mg/dL (10 Jul 2019 11:54)    CAPILLARY BLOOD GLUCOSE      POCT Blood Glucose.: 154 mg/dL (10 Jul 2019 21:47)    I&O's Summary    10 Jul 2019 07:01  -  2019 07:00  --------------------------------------------------------  IN: 1143 mL / OUT: 4125 mL / NET: -2982 mL    2019 07:01  -  2019 08:07  --------------------------------------------------------  IN: 6.7 mL / OUT: 100 mL / NET: -93.3 mL      Daily     Daily     PHYSICAL EXAMINATION:  General: WN/WD NAD  HEENT: PERRLA, EOMI, moist mucous membranes  Neurology: A&Ox3, nonfocal, CUADRA x 4  Respiratory: CTA B/L, normal respiratory effort, no wheezes, crackles, rales  CV: RRR, S1S2, no murmurs, rubs or gallops  Abdominal: Soft, NT, ND +BS, Last BM  Extremities: No edema, + peripheral pulses    LABS:                          8.8    14.1  )-----------( 374      ( 2019 03:21 )             25.6     07-11    138  |  101  |  49<H>  ----------------------------<  222<H>  5.1   |  21<L>  |  2.24<H>    Ca    8.4      2019 03:21  Phos  4.1     07-11  Mg     2.3         TPro  7.7  /  Alb  3.8  /  TBili  0.6  /  DBili  x   /  AST  484<H>  /  ALT  729<H>  /  AlkPhos  149<H>      LIVER FUNCTIONS - ( 2019 03:21 )  Alb: 3.8 g/dL / Pro: 7.7 g/dL / ALK PHOS: 149 U/L / ALT: 729 U/L / AST: 484 U/L / GGT: x               Urinalysis Basic - ( 2019 13:28 )    Color: Yellow / Appearance: Clear / S.012 / pH: x  Gluc: x / Ketone: Negative  / Bili: Negative / Urobili: Negative   Blood: x / Protein: Trace / Nitrite: Negative   Leuk Esterase: Negative / RBC: 1 /hpf / WBC 1 /HPF   Sq Epi: x / Non Sq Epi: 0 /hpf / Bacteria: Moderate      TELEMETRY:     EKG:     IMAGING:

## 2019-07-12 LAB
ALBUMIN SERPL ELPH-MCNC: 3.7 G/DL — SIGNIFICANT CHANGE UP (ref 3.3–5)
ALP SERPL-CCNC: 149 U/L — HIGH (ref 40–120)
ALT FLD-CCNC: 595 U/L — HIGH (ref 10–45)
ANION GAP SERPL CALC-SCNC: 13 MMOL/L — SIGNIFICANT CHANGE UP (ref 5–17)
AST SERPL-CCNC: 272 U/L — HIGH (ref 10–40)
BASE EXCESS BLDMV CALC-SCNC: 0.7 MMOL/L — SIGNIFICANT CHANGE UP (ref -3–3)
BASOPHILS # BLD AUTO: 0 K/UL — SIGNIFICANT CHANGE UP (ref 0–0.2)
BASOPHILS NFR BLD AUTO: 0.2 % — SIGNIFICANT CHANGE UP (ref 0–2)
BILIRUB SERPL-MCNC: 0.7 MG/DL — SIGNIFICANT CHANGE UP (ref 0.2–1.2)
BUN SERPL-MCNC: 51 MG/DL — HIGH (ref 7–23)
CALCIUM SERPL-MCNC: 8.5 MG/DL — SIGNIFICANT CHANGE UP (ref 8.4–10.5)
CHLORIDE SERPL-SCNC: 100 MMOL/L — SIGNIFICANT CHANGE UP (ref 96–108)
CO2 BLDMV-SCNC: 25 MMOL/L — SIGNIFICANT CHANGE UP (ref 21–29)
CO2 SERPL-SCNC: 22 MMOL/L — SIGNIFICANT CHANGE UP (ref 22–31)
CREAT SERPL-MCNC: 2.41 MG/DL — HIGH (ref 0.5–1.3)
EOSINOPHIL # BLD AUTO: 0.3 K/UL — SIGNIFICANT CHANGE UP (ref 0–0.5)
EOSINOPHIL NFR BLD AUTO: 3 % — SIGNIFICANT CHANGE UP (ref 0–6)
GAS PNL BLDMV: SIGNIFICANT CHANGE UP
GLUCOSE BLDC GLUCOMTR-MCNC: 184 MG/DL — HIGH (ref 70–99)
GLUCOSE BLDC GLUCOMTR-MCNC: 208 MG/DL — HIGH (ref 70–99)
GLUCOSE BLDC GLUCOMTR-MCNC: 250 MG/DL — HIGH (ref 70–99)
GLUCOSE BLDC GLUCOMTR-MCNC: 270 MG/DL — HIGH (ref 70–99)
GLUCOSE BLDC GLUCOMTR-MCNC: 316 MG/DL — HIGH (ref 70–99)
GLUCOSE SERPL-MCNC: 226 MG/DL — HIGH (ref 70–99)
HCO3 BLDMV-SCNC: 24 MMOL/L — SIGNIFICANT CHANGE UP (ref 20–28)
HCT VFR BLD CALC: 27 % — LOW (ref 34.5–45)
HGB BLD-MCNC: 9.1 G/DL — LOW (ref 11.5–15.5)
HOROWITZ INDEX BLDMV+IHG-RTO: 21 — SIGNIFICANT CHANGE UP
LACTATE SERPL-SCNC: 0.9 MMOL/L — SIGNIFICANT CHANGE UP (ref 0.7–2)
LYMPHOCYTES # BLD AUTO: 1 K/UL — SIGNIFICANT CHANGE UP (ref 1–3.3)
LYMPHOCYTES # BLD AUTO: 8.9 % — LOW (ref 13–44)
MAGNESIUM SERPL-MCNC: 2.3 MG/DL — SIGNIFICANT CHANGE UP (ref 1.6–2.6)
MCHC RBC-ENTMCNC: 29.4 PG — SIGNIFICANT CHANGE UP (ref 27–34)
MCHC RBC-ENTMCNC: 33.6 GM/DL — SIGNIFICANT CHANGE UP (ref 32–36)
MCV RBC AUTO: 87.5 FL — SIGNIFICANT CHANGE UP (ref 80–100)
MONOCYTES # BLD AUTO: 0.6 K/UL — SIGNIFICANT CHANGE UP (ref 0–0.9)
MONOCYTES NFR BLD AUTO: 5.2 % — SIGNIFICANT CHANGE UP (ref 2–14)
NEUTROPHILS # BLD AUTO: 9.6 K/UL — HIGH (ref 1.8–7.4)
NEUTROPHILS NFR BLD AUTO: 82.7 % — HIGH (ref 43–77)
O2 CT VFR BLD CALC: 38 MMHG — SIGNIFICANT CHANGE UP (ref 30–65)
PCO2 BLDMV: 36 MMHG — SIGNIFICANT CHANGE UP (ref 30–65)
PH BLDMV: 7.44 — SIGNIFICANT CHANGE UP (ref 7.32–7.45)
PHOSPHATE SERPL-MCNC: 2.6 MG/DL — SIGNIFICANT CHANGE UP (ref 2.5–4.5)
PLATELET # BLD AUTO: 349 K/UL — SIGNIFICANT CHANGE UP (ref 150–400)
POTASSIUM SERPL-MCNC: 4.2 MMOL/L — SIGNIFICANT CHANGE UP (ref 3.5–5.3)
POTASSIUM SERPL-SCNC: 4.2 MMOL/L — SIGNIFICANT CHANGE UP (ref 3.5–5.3)
PROT SERPL-MCNC: 7.9 G/DL — SIGNIFICANT CHANGE UP (ref 6–8.3)
RBC # BLD: 3.09 M/UL — LOW (ref 3.8–5.2)
RBC # FLD: 17.8 % — HIGH (ref 10.3–14.5)
SAO2 % BLDMV: 65 % — SIGNIFICANT CHANGE UP (ref 60–90)
SODIUM SERPL-SCNC: 135 MMOL/L — SIGNIFICANT CHANGE UP (ref 135–145)
WBC # BLD: 11.6 K/UL — HIGH (ref 3.8–10.5)
WBC # FLD AUTO: 11.6 K/UL — HIGH (ref 3.8–10.5)

## 2019-07-12 PROCEDURE — 99233 SBSQ HOSP IP/OBS HIGH 50: CPT | Mod: GC

## 2019-07-12 PROCEDURE — 93010 ELECTROCARDIOGRAM REPORT: CPT

## 2019-07-12 PROCEDURE — 71045 X-RAY EXAM CHEST 1 VIEW: CPT | Mod: 26

## 2019-07-12 RX ORDER — FUROSEMIDE 40 MG
40 TABLET ORAL DAILY
Refills: 0 | Status: DISCONTINUED | OUTPATIENT
Start: 2019-07-13 | End: 2019-07-16

## 2019-07-12 RX ORDER — ALPRAZOLAM 0.25 MG
0.25 TABLET ORAL ONCE
Refills: 0 | Status: DISCONTINUED | OUTPATIENT
Start: 2019-07-12 | End: 2019-07-12

## 2019-07-12 RX ORDER — HYDRALAZINE HCL 50 MG
50 TABLET ORAL EVERY 8 HOURS
Refills: 0 | Status: DISCONTINUED | OUTPATIENT
Start: 2019-07-12 | End: 2019-07-19

## 2019-07-12 RX ADMIN — Medication 2: at 12:19

## 2019-07-12 RX ADMIN — MONTELUKAST 10 MILLIGRAM(S): 4 TABLET, CHEWABLE ORAL at 11:54

## 2019-07-12 RX ADMIN — Medication 11 UNIT(S): at 08:25

## 2019-07-12 RX ADMIN — Medication 2: at 23:45

## 2019-07-12 RX ADMIN — Medication 11 UNIT(S): at 12:20

## 2019-07-12 RX ADMIN — PANTOPRAZOLE SODIUM 40 MILLIGRAM(S): 20 TABLET, DELAYED RELEASE ORAL at 05:28

## 2019-07-12 RX ADMIN — Medication 0.12 MILLIGRAM(S): at 05:28

## 2019-07-12 RX ADMIN — CHLORHEXIDINE GLUCONATE 1 APPLICATION(S): 213 SOLUTION TOPICAL at 23:47

## 2019-07-12 RX ADMIN — HEPARIN SODIUM 5000 UNIT(S): 5000 INJECTION INTRAVENOUS; SUBCUTANEOUS at 23:46

## 2019-07-12 RX ADMIN — Medication 50 MILLIGRAM(S): at 05:28

## 2019-07-12 RX ADMIN — POLYETHYLENE GLYCOL 3350 17 GRAM(S): 17 POWDER, FOR SOLUTION ORAL at 11:54

## 2019-07-12 RX ADMIN — Medication 1: at 17:25

## 2019-07-12 RX ADMIN — TICAGRELOR 90 MILLIGRAM(S): 90 TABLET ORAL at 17:23

## 2019-07-12 RX ADMIN — Medication 50 MICROGRAM(S): at 05:28

## 2019-07-12 RX ADMIN — Medication 650 MILLIGRAM(S): at 21:45

## 2019-07-12 RX ADMIN — HEPARIN SODIUM 5000 UNIT(S): 5000 INJECTION INTRAVENOUS; SUBCUTANEOUS at 05:28

## 2019-07-12 RX ADMIN — INSULIN GLARGINE 28 UNIT(S): 100 INJECTION, SOLUTION SUBCUTANEOUS at 23:46

## 2019-07-12 RX ADMIN — Medication 50 MILLIGRAM(S): at 23:53

## 2019-07-12 RX ADMIN — Medication 650 MILLIGRAM(S): at 20:44

## 2019-07-12 RX ADMIN — HEPARIN SODIUM 5000 UNIT(S): 5000 INJECTION INTRAVENOUS; SUBCUTANEOUS at 14:01

## 2019-07-12 RX ADMIN — Medication 10 MILLIGRAM(S): at 01:01

## 2019-07-12 RX ADMIN — Medication 100 MILLIGRAM(S): at 05:28

## 2019-07-12 RX ADMIN — ATORVASTATIN CALCIUM 40 MILLIGRAM(S): 80 TABLET, FILM COATED ORAL at 23:46

## 2019-07-12 RX ADMIN — Medication 3: at 08:25

## 2019-07-12 RX ADMIN — LIDOCAINE 1 PATCH: 4 CREAM TOPICAL at 23:47

## 2019-07-12 RX ADMIN — TICAGRELOR 90 MILLIGRAM(S): 90 TABLET ORAL at 05:28

## 2019-07-12 RX ADMIN — LIDOCAINE 1 PATCH: 4 CREAM TOPICAL at 09:03

## 2019-07-12 RX ADMIN — LIDOCAINE 1 PATCH: 4 CREAM TOPICAL at 08:26

## 2019-07-12 RX ADMIN — SENNA PLUS 2 TABLET(S): 8.6 TABLET ORAL at 23:46

## 2019-07-12 RX ADMIN — Medication 100 MILLIGRAM(S): at 17:23

## 2019-07-12 RX ADMIN — Medication 3 MILLIGRAM(S): at 23:46

## 2019-07-12 RX ADMIN — Medication 81 MILLIGRAM(S): at 11:55

## 2019-07-12 RX ADMIN — Medication 50 MILLIGRAM(S): at 14:01

## 2019-07-12 NOTE — PROGRESS NOTE ADULT - ASSESSMENT
Pt is a 72 yo woman with PMHx of HTN, T2DM, CAD s/p CABG (LIMA to LAD, SVG to OM, SVG to PDA 2014 at [facility]), non-dilated ICM (EF 20-25%), severe mitral regurgitation s/p mitral clip (6/13/19 Dr. Newman), severe pulm HTN, CKD, hypothyroidism, who is presenting to ED after [facility]. Also complaining of intermittent chest pain. Of note, pt was recently discharged on 6/19 after having mitral clip procedure completed. Pt says that she has been experiencing SOB for about 1 week. However, she was never noted to be hypoxic in rehab until today. She was not able to provide much hx 2/2 SOB and being on bipap. Daughter at bedside reporting that pt was well immediately after discharge. However, she gradually developed worsening SOB. Better with rest initially. Nothing alleviating SOB now. It is constant throughout the day, made worse with exertion. No fevers, chills, nausea, vomiting, cough, sputum production, chest tightness, pressure, palpitations, LOC, syncope.    In the ED, pt afebrile, , /75, RR 22, O2 100% on 2L NC. She subsequently developed worsened work of breathing and was placed on bipap with improvement. She was given , lasix 40mg IVP x1. CXR significant for pulmonary edema. Anemia to 9.3/28.5. BUN/Cr 42/1.72 (54/2.54 on 6/19). BNP 6626. VBG 7.49/29/61/22. TTE pending. (05 Jul 2019 19:02)    Pt transferred to CCU for further diuresis and management of acute decompensated heart failure and acute respiratory distress.  Pt with improved cardio-pulmonary status.  Pt also c/o abd pain, thought to be due to  bowel edema/ischemia 2/2 to low CO/CI.  Pt with transminitis likely from hypopersion state.  KUB (-) for acute abdominal pathology.      Pt has been afebrile.  WBC was trending upwards.  Pt started on vanco/meropenem.  Cultures sent.  Pt with prior h/o ESBL E.coli (6/13) from urine cultures.   Cxr clear lungs.  UA from 7/9 (-) LE/nit.    ID consult called for further abx managment.           Leukocytosis:    - r/o infectious source.  Suspect reactive leukocytosis.  Pt without localizing symptoms.  Afebrile.  Cxr clear.  UA (-).  Pt with cuellar in place, urine cultures unavailable and likely to be colonized.    Pt with prior UCx with ESBL from June, she does not appear to be acutely infected at this time.     - Bcx (-), and pt clinically improving (no fever, WBC trending downwards, hemodynamically stable, no new localizing symptoms), Agree with d/c all abx and continue to observe off.      Will follow,    Joselin Roman  [phone]

## 2019-07-12 NOTE — PROGRESS NOTE ADULT - SUBJECTIVE AND OBJECTIVE BOX
[facility]   MD RAHEEL SHAH MD RUORU WONG, PA    TEL:  OFFICE: [phone]  DR SANTOYO CELL: [phone]  JUSTEN KENDALL CELL: [phone]  DR. NGUYEN CELL: [phone]    RENAL FOLLOW UP NOTE  --------------------------------------------------------------------------------  HPI:      Pt seen and examined at bedside.   Keira SOB, chest pain     PAST HISTORY  --------------------------------------------------------------------------------  No significant changes to PMH, PSH, FHx, SHx, unless otherwise noted    ALLERGIES & MEDICATIONS  --------------------------------------------------------------------------------  Allergies    azithromycin (Hives; Pruritus)    Intolerances      Standing Inpatient Medications  aspirin enteric coated 81 milliGRAM(s) Oral daily  atorvastatin 40 milliGRAM(s) Oral at bedtime  chlorhexidine 2% Cloths 1 Application(s) Topical daily  dextrose 5%. 1000 milliLiter(s) IV Continuous <Continuous>  dextrose 50% Injectable 12.5 Gram(s) IV Push once  dextrose 50% Injectable 25 Gram(s) IV Push once  dextrose 50% Injectable 25 Gram(s) IV Push once  digoxin     Tablet 0.125 milliGRAM(s) Oral daily  docusate sodium 100 milliGRAM(s) Oral two times a day  furosemide Infusion 5 mG/Hr IV Continuous <Continuous>  heparin  Injectable 5000 Unit(s) SubCutaneous every 8 hours  hydrALAZINE 50 milliGRAM(s) Oral every 8 hours  insulin glargine Injectable (LANTUS) 28 Unit(s) SubCutaneous at bedtime  insulin lispro (HumaLOG) corrective regimen sliding scale   SubCutaneous three times a day before meals  insulin lispro (HumaLOG) corrective regimen sliding scale   SubCutaneous at bedtime  insulin lispro Injectable (HumaLOG) 11 Unit(s) SubCutaneous three times a day before meals  levothyroxine 50 MICROGram(s) Oral daily  lidocaine   Patch 1 Patch Transdermal daily  melatonin 3 milliGRAM(s) Oral at bedtime  montelukast 10 milliGRAM(s) Oral daily  pantoprazole    Tablet 40 milliGRAM(s) Oral before breakfast  polyethylene glycol 3350 17 Gram(s) Oral daily  senna 2 Tablet(s) Oral at bedtime  ticagrelor 90 milliGRAM(s) Oral every 12 hours    PRN Inpatient Medications  acetaminophen   Tablet .. 650 milliGRAM(s) Oral every 6 hours PRN  dextrose 40% Gel 15 Gram(s) Oral once PRN  glucagon  Injectable 1 milliGRAM(s) IntraMuscular once PRN      REVIEW OF SYSTEMS  --------------------------------------------------------------------------------  General: no fever  CVS: no chest pain  RESP: imroved sob, no cough  ABD: no abdominal pain    MSK: no edema     VITALS/PHYSICAL EXAM  --------------------------------------------------------------------------------  T(C): 37 (07-12-19 @ 07:00), Max: 37.1 (07-11-19 @ 15:00)  HR: 78 (07-12-19 @ 07:00) (68 - 92)  BP: 113/46 (07-12-19 @ 07:00) (87/46 - 115/57)  RR: 18 (07-12-19 @ 07:00) (14 - 23)  SpO2: 98% (07-12-19 @ 07:00) (98% - 100%)  Wt(kg): --        07-11-19 @ 07:01  -  07-12-19 @ 07:00  --------------------------------------------------------  IN: 508.4 mL / OUT: 1310 mL / NET: -801.6 mL      Physical Exam:  	Gen: NAD  	HEENT: MMM  	Pulm: CTA B/L  	CV: S1S2  	Abd: Soft, +BS  	Ext: No LE edema B/L                      Neuro: Awake   	Skin: Warm and Dry   	JUAN FRANCISCO cuellar    LABS/STUDIES  --------------------------------------------------------------------------------              9.1    11.6  >-----------<  349      [07-12-19 @ 04:26]              27.0     135  |  100  |  51  ----------------------------<  226      [07-12-19 @ 04:26]  4.2   |  22  |  2.41        Ca     8.5     [07-12-19 @ 04:26]      Mg     2.3     [07-12-19 @ 04:26]      Phos  2.6     [07-12-19 @ 04:26]    TPro  7.9  /  Alb  3.7  /  TBili  0.7  /  DBili  x   /  AST  272  /  ALT  595  /  AlkPhos  149  [07-12-19 @ 04:26]          Creatinine Trend:  SCr 2.41 [07-12 @ 04:26]  SCr 2.42 [07-11 @ 21:23]  SCr 2.37 [07-11 @ 13:08]  SCr 2.24 [07-11 @ 03:21]  SCr 2.18 [07-10 @ 22:05]    Urinalysis - [07-09-19 @ 13:28]      Color Yellow / Appearance Clear / SG 1.012 / pH 6.0      Gluc Negative / Ketone Negative  / Bili Negative / Urobili Negative       Blood Trace / Protein Trace / Leuk Est Negative / Nitrite Negative      RBC 1 / WBC 1 / Hyaline 0 / Gran  / Sq Epi  / Non Sq Epi 0 / Bacteria Moderate    Urine Creatinine 39      [07-07-19 @ 05:09]  Urine Sodium 72      [07-07-19 @ 00:58]  Urine Urea Nitrogen 342      [07-07-19 @ 04:36]    Iron 42, TIBC 348, %sat 12      [07-05-19 @ 22:32]  Ferritin 130      [07-05-19 @ 22:32]  .4 (Ca --)      [04-25-19 @ 05:45]   --  PTH 43.55 (Ca --)      [09-10-18 @ 07:15]   --  PTH 36.60 (Ca --)      [09-08-18 @ 03:15]   --  Vitamin D (25OH) 25.9      [05-01-19 @ 04:00]  HbA1c 7.4      [07-05-19 @ 22:32]  TSH 2.00      [07-05-19 @ 22:32]  Lipid: chol 148, , HDL 35,       [06-01-19 @ 08:00]

## 2019-07-12 NOTE — PHYSICAL THERAPY INITIAL EVALUATION ADULT - ADDITIONAL COMMENTS
prior to recent hospitalizations/ rehab, pt lived in a private house with + steps inside and amb without AD. As per dtr at bedside, pt with minimally ambulatory at [facility] 2/2 SOB.

## 2019-07-12 NOTE — CHART NOTE - NSCHARTNOTEFT_GEN_A_CORE
Nutrition Note    Patient seen per request of RN.  Full nutrition assessment completed yesterday, 7/11.    Per RN, Pt with poor po intake, unclear if she is following Halal diet.      RD visited Patient, daughter at bedside.  Daughter requests continuing Halal diet.  Notes patient has poor appetite.  RD discussed high protein-high energy intake, saving items from meal tray to mimic small frequent meals in context of poor appetite.  Requesting yogurt.  Daughter amenable to patient continuing to receive Glucerna x1/day (only drinking a few sips).  Encouraged use of Glucerna in context of low appetite.  Made aware RD remains available PRN.    Lore Samayoa MS, RD, CDN  Pager# [phone]

## 2019-07-12 NOTE — PROGRESS NOTE ADULT - ATTENDING COMMENTS
Patient is seen and examined with fellow, NP and the CCU house-staff. I agree with the history, physical and the assessment and plan.  toleratingbeing off of the   with CVP 2-3, will d/c the lasix gtt  monitor urine output  will d/c the swan  monitor central sats via the cordis

## 2019-07-12 NOTE — PHYSICAL THERAPY INITIAL EVALUATION ADULT - PERTINENT HX OF CURRENT PROBLEM, REHAB EVAL
Pt is a 74 y/o female admitted to [facility] on 7/5/19 PMHx of HTN, T2DM, CAD s/p CABG (LIMA to LAD, SVG to OM, SVG to PDA 2014 at [facility]), non-dilated ICM (EF 20-25%), severe mitral regurgitation s/p mitral clip (6/13/19 Dr. Newman), severe pulm HTN, CKD, hypothyroidism, who is presenting to ED after experiencing worsening SOB at Rehab.

## 2019-07-12 NOTE — PROGRESS NOTE ADULT - SUBJECTIVE AND OBJECTIVE BOX
PATIENT:  SELVIN CLAIRE  [id number]    CHIEF COMPLAINT:  Patient is a 73y old  Female who presents with a chief complaint of Hypoxia, SOB (2019 07:54)      INTERVAL HISTORYOVERNIGHT EVENTS:  RUBI o/n    REVIEW OF SYSTEMS:    Constitutional:     [ ] negative [ ] fevers [ ] chills [ ] weight loss [ ] weight gain  HEENT:                  [ ] negative [ ] dry eyes [ ] eye irritation [ ] postnasal drip [ ] nasal congestion  CV:                         [ ] negative  [ ] chest pain [ ] orthopnea [ ] palpitations [ ] murmur  Resp:                     [ ] negative [ ] cough [ ] shortness of breath [ ] dyspnea [ ] wheezing [ ] sputum [ ] hemoptysis  GI:                          [ ] negative [ ] nausea [ ] vomiting [ ] diarrhea [ ] constipation [ ] abd pain [ ] dysphagia   :                        [ ] negative [ ] dysuria [ ] nocturia [ ] hematuria [ ] increased urinary frequency  Musculoskeletal: [ ] negative [ ] back pain [ ] myalgias [ ] arthralgias [ ] fracture  Skin:                       [ ] negative [ ] rash [ ] itch  Neurological:        [ ] negative [ ] headache [ ] dizziness [ ] syncope [ ] weakness [ ] numbness  Psychiatric:           [ ] negative [ ] anxiety [ ] depression  Endocrine:            [ ] negative [ ] diabetes [ ] thyroid problem  Heme/Lymph:      [ ] negative [ ] anemia [ ] bleeding problem  Allergic/Immune: [ ] negative [ ] itchy eyes [ ] nasal discharge [ ] hives [ ] angioedema    [ ] All other systems negative  [ ] Unable to assess ROS because ________.    MEDICATIONS:  MEDICATIONS  (STANDING):  aspirin enteric coated 81 milliGRAM(s) Oral daily  atorvastatin 40 milliGRAM(s) Oral at bedtime  chlorhexidine 2% Cloths 1 Application(s) Topical daily  dextrose 5%. 1000 milliLiter(s) (50 mL/Hr) IV Continuous <Continuous>  dextrose 50% Injectable 12.5 Gram(s) IV Push once  dextrose 50% Injectable 25 Gram(s) IV Push once  dextrose 50% Injectable 25 Gram(s) IV Push once  digoxin     Tablet 0.125 milliGRAM(s) Oral daily  docusate sodium 100 milliGRAM(s) Oral two times a day  furosemide Infusion 5 mG/Hr (2.5 mL/Hr) IV Continuous <Continuous>  heparin  Injectable 5000 Unit(s) SubCutaneous every 8 hours  hydrALAZINE 50 milliGRAM(s) Oral every 8 hours  insulin glargine Injectable (LANTUS) 28 Unit(s) SubCutaneous at bedtime  insulin lispro (HumaLOG) corrective regimen sliding scale   SubCutaneous three times a day before meals  insulin lispro (HumaLOG) corrective regimen sliding scale   SubCutaneous at bedtime  insulin lispro Injectable (HumaLOG) 11 Unit(s) SubCutaneous three times a day before meals  levothyroxine 50 MICROGram(s) Oral daily  lidocaine   Patch 1 Patch Transdermal daily  melatonin 3 milliGRAM(s) Oral at bedtime  montelukast 10 milliGRAM(s) Oral daily  pantoprazole    Tablet 40 milliGRAM(s) Oral before breakfast  polyethylene glycol 3350 17 Gram(s) Oral daily  senna 2 Tablet(s) Oral at bedtime  ticagrelor 90 milliGRAM(s) Oral every 12 hours    MEDICATIONS  (PRN):  acetaminophen   Tablet .. 650 milliGRAM(s) Oral every 6 hours PRN Mild Pain (1 - 3), Moderate Pain (4 - 6)  dextrose 40% Gel 15 Gram(s) Oral once PRN Blood Glucose LESS THAN 70 milliGRAM(s)/deciliter  glucagon  Injectable 1 milliGRAM(s) IntraMuscular once PRN Glucose LESS THAN 70 milligrams/deciliter      ALLERGIES:  Allergies    azithromycin (Hives; Pruritus)    Intolerances        OBJECTIVE:  ICU Vital Signs Last 24 Hrs  T(C): 37 (2019 07:00), Max: 37.1 (2019 15:00)  T(F): 98.6 (2019 07:00), Max: 98.8 (2019 15:00)  HR: 78 (2019 07:00) (68 - 92)  BP: 113/46 (2019 07:00) (87/46 - 115/57)  BP(mean): 76 (2019 07:00) (67 - 84)  ABP: --  ABP(mean): --  RR: 18 (2019 07:00) (14 - 23)  SpO2: 98% (2019 07:00) (98% - 100%)      Adult Advanced Hemodynamics Last 24 Hrs  CVP(mm Hg): 2 (2019 07:00) (1 - 5)  CVP(cm H2O): --  CO: --  CI: --  PA: 52/15 (2019 07:00) (45/9 - 59/19)  PA(mean): 27 (2019 07:00) (19 - 37)  PCWP: --  SVR: --  SVRI: --  PVR: --  PVRI: --  CAPILLARY BLOOD GLUCOSE      POCT Blood Glucose.: 124 mg/dL (2019 21:02)  POCT Blood Glucose.: 236 mg/dL (2019 17:57)  POCT Blood Glucose.: 207 mg/dL (2019 12:17)  POCT Blood Glucose.: 271 mg/dL (2019 08:25)    CAPILLARY BLOOD GLUCOSE      POCT Blood Glucose.: 124 mg/dL (2019 21:02)    I&O's Summary    2019 07:01  -  2019 07:00  --------------------------------------------------------  IN: 508.4 mL / OUT: 1310 mL / NET: -801.6 mL      Daily     Daily Weight in k.9 (2019 00:00)    PHYSICAL EXAMINATION:  General: WN/WD NAD  HEENT: PERRLA, EOMI, moist mucous membranes  Neurology: A&Ox3, nonfocal, CUADRA x 4  Respiratory: CTA B/L, normal respiratory effort, no wheezes, crackles, rales  CV: RRR, S1S2, no murmurs, rubs or gallops  Abdominal: Soft, NT, ND +BS, Last BM  Extremities: No edema, + peripheral pulses  Incisions:   Tubes:    LABS:                          9.1    11.6  )-----------( 349      ( 2019 04:26 )             27.0     07-12    135  |  100  |  51<H>  ----------------------------<  226<H>  4.2   |  22  |  2.41<H>    Ca    8.5      2019 04:26  Phos  2.6     07-12  Mg     2.3     07-12    TPro  7.9  /  Alb  3.7  /  TBili  0.7  /  DBili  x   /  AST  272<H>  /  ALT  595<H>  /  AlkPhos  149<H>  07-12    LIVER FUNCTIONS - ( 2019 04:26 )  Alb: 3.7 g/dL / Pro: 7.9 g/dL / ALK PHOS: 149 U/L / ALT: 595 U/L / AST: 272 U/L / GGT: x                       TELEMETRY:     EKG:     IMAGING:

## 2019-07-12 NOTE — PROGRESS NOTE ADULT - SUBJECTIVE AND OBJECTIVE BOX
CHIEF COMPLAINT: condition improving family  at bed side   Patient is a 73y old  Female who presents with a chief complaint of Hypoxia, SOB  SUBJECTIVE:     REVIEW OF SYSTEMS:    CONSTITUTIONAL: (x  )  weakness,  (  ) fevers or chills  EYES/ENT: (  )visual changes;     NECK: (  ) pain or stiffness  RESPIRATORY:   (  )cough, wheezing, hemoptysis;  (  ) shortness of breath  CARDIOVASCULAR:  (  )chest pain or palpitations  GASTROINTESTINAL:   (  )abdominal or epigastric pain.  (  ) nausea, vomiting, or hematemesis;   (   ) diarrhea or constipation.   GENITOURINARY:   (    ) dysuria, frequency or hematuria  NEUROLOGICAL:  (   ) numbness or weakness   All other review of systems is negative unless indicated above    Vital Signs Last 24 Hrs  T(C): 36.7 (12 Jul 2019 18:25), Max: 37.1 (12 Jul 2019 09:00)  T(F): 98 (12 Jul 2019 18:25), Max: 98.8 (12 Jul 2019 09:00)  HR: 78 (12 Jul 2019 18:25) (68 - 86)  BP: 107/58 (12 Jul 2019 18:25) (89/38 - 127/79)  BP(mean): 71 (12 Jul 2019 18:00) (60 - 100)  RR: 18 (12 Jul 2019 18:25) (14 - 23)  SpO2: 100% (12 Jul 2019 18:25) (97% - 100%)    I&O's Summary    11 Jul 2019 07:01  -  12 Jul 2019 07:00  --------------------------------------------------------  IN: 508.4 mL / OUT: 1310 mL / NET: -801.6 mL    12 Jul 2019 07:01  -  12 Jul 2019 18:31  --------------------------------------------------------  IN: 242.5 mL / OUT: 430 mL / NET: -187.5 mL        CAPILLARY BLOOD GLUCOSE      POCT Blood Glucose.: 184 mg/dL (12 Jul 2019 17:16)  POCT Blood Glucose.: 208 mg/dL (12 Jul 2019 12:10)  POCT Blood Glucose.: 270 mg/dL (12 Jul 2019 08:16)  POCT Blood Glucose.: 124 mg/dL (11 Jul 2019 21:02)      PHYSICAL EXAM:  General: WN/WD NAD  HEENT: PERRLA, EOMI, moist mucous membranes  Neurology: A&Ox3, nonfocal,    Respiratory: CTA B/L, normal respiratory effort, no wheezes, crackles, rales  CV: RRR, S1S2, no murmurs, rubs or gallops  Abdominal: Soft, NT, ND +BS, Last BM  Extremities: No edema, + peripheral pulses       MEDICATIONS:  MEDICATIONS  (STANDING):  aspirin enteric coated 81 milliGRAM(s) Oral daily  atorvastatin 40 milliGRAM(s) Oral at bedtime  chlorhexidine 2% Cloths 1 Application(s) Topical daily  dextrose 5%. 1000 milliLiter(s) (50 mL/Hr) IV Continuous <Continuous>  dextrose 50% Injectable 12.5 Gram(s) IV Push once  dextrose 50% Injectable 25 Gram(s) IV Push once  dextrose 50% Injectable 25 Gram(s) IV Push once  digoxin     Tablet 0.125 milliGRAM(s) Oral daily  docusate sodium 100 milliGRAM(s) Oral two times a day  heparin  Injectable 5000 Unit(s) SubCutaneous every 8 hours  hydrALAZINE 50 milliGRAM(s) Oral every 8 hours  insulin glargine Injectable (LANTUS) 28 Unit(s) SubCutaneous at bedtime  insulin lispro (HumaLOG) corrective regimen sliding scale   SubCutaneous three times a day before meals  insulin lispro (HumaLOG) corrective regimen sliding scale   SubCutaneous at bedtime  insulin lispro Injectable (HumaLOG) 11 Unit(s) SubCutaneous three times a day before meals  levothyroxine 50 MICROGram(s) Oral daily  lidocaine   Patch 1 Patch Transdermal daily  melatonin 3 milliGRAM(s) Oral at bedtime  montelukast 10 milliGRAM(s) Oral daily  pantoprazole    Tablet 40 milliGRAM(s) Oral before breakfast  polyethylene glycol 3350 17 Gram(s) Oral daily  senna 2 Tablet(s) Oral at bedtime  ticagrelor 90 milliGRAM(s) Oral every 12 hours      LABS: All Labs Reviewed:                        9.1    11.6  )-----------( 349      ( 12 Jul 2019 04:26 )             27.0     07-12    135  |  100  |  51<H>  ----------------------------<  226<H>  4.2   |  22  |  2.41<H>    Ca    8.5      12 Jul 2019 04:26  Phos  2.6     07-12  Mg     2.3     07-12    TPro  7.9  /  Alb  3.7  /  TBili  0.7  /  DBili  x   /  AST  272<H>  /  ALT  595<H>  /  AlkPhos  149<H>  07-12          Blood Culture: 07-09 @ 17:46  Organism --  Gram Stain Blood -- Gram Stain --  Specimen Source .Blood  Culture-Blood --      Urine Culture      RADIOLOGY/EKG:    ASSESSMENT AND PLAN:  74 yo woman with PMHx of HTN, T2DM, CAD s/p CABG (LIMA to LAD, SVG to OM, SVG to PDA 2014 at [facility]), non-dilated ICM (EF 20-25%), severe mitral regurgitation s/p mitral clip (6/13/19 Dr. Newman), severe pulm HTN, CKD, hypothyroidism, who is presenting to ED after experiencing worsening SOB and intermittent chest pain at [facility]. In ED, pt was placed on BiPAP and given lasix 40mg IVPx1. Transferred to CCU for further management. Undergoing diuresis, with improved hemodynamics. Found to have a leukocytosis, thought to be reactive.     Cardiac  Acute decompensated heart failure/volume   -TTE 7/5 showed EF 15-20% with global LV systolic dysfunction  -lasix gtt titrated to 5. to switch to lasix 60 IV BID if good UOP   -Strict I/O, trend weights  -Cardiac enzymes plateau'd  -structural heart disease consulted    Cardioprotection  -ASA, brilinta, statin  -d/c'd lopressor 12.5  -off ACE 2/2 pressures  -DM management as below  -to trend bp's    Hemodynamics  -uptitrated hydralizine to 30 to lower SVR  -d'c;d vaso as elevated SVR point's to elevated afterload  -d/c'd beta blocker as above  -continue to monitor hemodynamics q4    EP  -now NSR, no SVTs  -dig 0.125 po  -EP consulted, no amiodarone 2/2 uptrending LFTs  -outpt followup for ICD implant for primary prevention  -On tele  -Continue to monitor  -Keep K>4 and Mg>2  -replete electrolytes as needed    Pulmonary  -acute respiratory distress 2/2 to ADHF, resolved  -off bi-pap  -sating well on room air  -continue to trend SpO2    Endo  DM, hyperglycemia  -Hgb A1C 7.4  -started Lantus 25U at bedtime  -started ISS pre meal and at bedtime  -Started lispro 10U pre meal    Hypothyroidism  -On synthroid  -TSH 2.0  -T4, Serum: 6.8 ug/dL (06.12.19 @ 15:17)    Renal, CKD IV   -New baseline of 1.8-2.0 as per nephro  -Cr starting to downtrend  -improving given improved hemodynamics  -To trend renal function and avoid nephrotoxic meds    GI  -had abdominal pain, now resolved  -likely 2/2 to bowel edema/ischemia 2/2 to low CO/CI  -KUB (-) for acute abdominal pathology  -transaminitis, also likely 2/2 to low output state, continuing to downtrending  -anticipate improvement with improving hemodynamics  -to continue to trend    ID  -afebrile  -WBCs downtrending   -Feliciano Cx ordered  -UCx grew ESBL, sensitive to penems, TMP-Sulfa, NFT  -Noted to have grown ESBL in previous hospitalization (6/13)  -BCx NGTD  -Started vanc/zosyn (day 2)  -d/c'd zosyn as resistant  -started meropenem 500 BID  -ID consulted. leukocytosis = reactionary  -D/c'd abx given pt is afebrile, leukocytosis resolving, no localizing s&s  -to monitor off abx    Heme  -anemia, chronic  -b/l appx 8-9  -to trend       FEN: none; replete electrolytes  DVT PPX:    ADVANCED DIRECTIVE:    DISPOSITION: stable for transfer to [facility]

## 2019-07-12 NOTE — PROGRESS NOTE ADULT - ASSESSMENT
Pt is a 72 yo woman with PMHx of HTN, T2DM, CAD s/p CABG (LIMA to LAD, SVG to OM, SVG to PDA 2014 at [facility]), non-dilated ICM (EF 20-25%), severe mitral regurgitation s/p mitral clip (6/13/19 Dr. Newman), severe pulm HTN, CKD, hypothyroidism, who is presenting to ED after experiencing worsening SOB      A/P:      MERVIN  MERVIN sec to cardiogenic shock  Renal function improving  PT non -oliguric  Continue ionotropic support per CCU  Monitor renal function   Avoid further nephrotoxics, NSAIDS RCA    CKD stage 4:  baseline Scr 1.8-2.0  Renal function fluctuates sec to CHF  Monitor renal function at present    SOB:  in setting of HF  Continue care per CCU    ACidosis   sec to RF  mOnitor serum Co2 at present Pt is a 74 yo woman with PMHx of HTN, T2DM, CAD s/p CABG (LIMA to LAD, SVG to OM, SVG to PDA 2014 at [facility]), non-dilated ICM (EF 20-25%), severe mitral regurgitation s/p mitral clip (6/13/19 Dr. Newman), severe pulm HTN, CKD, hypothyroidism, who is presenting to ED after experiencing worsening SOB      A/P:      MERVIN  MERVIN sec to cardiogenic shock  Renal function improving  PT non -oliguric  Off ionotropic support per CCU  Monitor renal function   Avoid further nephrotoxics, NSAIDS RCA    CKD stage 4:  baseline Scr 1.8-2.0  Renal function fluctuates sec to CHF  Monitor renal function at present    SOB:  in setting of HF  Continue care per CCU    ACidosis   sec to RF  mOnitor serum Co2 at present

## 2019-07-12 NOTE — PROGRESS NOTE ADULT - ASSESSMENT
A/P:  Miss Gamino is 74 yo woman with PMHx of HTN, T2DM, CAD s/p CABG (LIMA to LAD, SVG to OM, SVG to PDA 2014 at [facility]), non-dilated ICM (EF 20-25%), severe mitral regurgitation s/p mitral clip (6/13/19 Dr. Newman), severe pulm HTN, CKD, hypothyroidism, who is presenting to ED after experiencing worsening SOB and intermittent chest pain at [facility]. In ED, pt was placed on BiPAP and given lasix 40mg IVPx1. Transferred to CCU for further management. Undergoing diuresis, with improved hemodynamics. Found to have a leukocytosis, thought to be reactive.     Cardiac  Acute decompensated heart failure/volume   -TTE 7/5 showed EF 15-20% with global LV systolic dysfunction  -lasix gtt titrated to 5. to switch to lasix 60 IV BID if good UOP   -Strict I/O, trend weights  -Cardiac enzymes plateau'd  -structural heart disease consulted    Cardioprotection  -ASA, brilinta, statin  -d/c'd lopressor 12.5  -off ACE 2/2 pressures  -DM management as below  -to trend bp's    Hemodynamics  -uptitrated hydralizine to 30 to lower SVR  -d'c;d vaso as elevated SVR point's to elevated afterload  -d/c'd beta blocker as above  -continue to monitor hemodynamics q4    EP  -now NSR, no SVTs  -dig 0.125 po  -EP consulted, no amiodarone 2/2 uptrending LFTs  -outpt followup for ICD implant for primary prevention  -On tele  -Continue to monitor  -Keep K>4 and Mg>2  -replete electrolytes as needed    Pulmonary  -acute respiratory distress 2/2 to ADHF, resolved  -off bi-pap  -sating well on room air  -continue to trend SpO2    Endo  DM, hyperglycemia  -Hgb A1C 7.4  -started Lantus 25U at bedtime  -started ISS pre meal and at bedtime  -Started lispro 10U pre meal    Hypothyroidism  -On synthroid  -TSH 2.0  -T4, Serum: 6.8 ug/dL (06.12.19 @ 15:17)    Renal, CKD IV   -New baseline of 1.8-2.0 as per nephro  -Cr starting to downtrend  -improving given improved hemodynamics  -To trend renal function and avoid nephrotoxic meds    GI  -had abdominal pain, now resolved  -likely 2/2 to bowel edema/ischemia 2/2 to low CO/CI  -KUB (-) for acute abdominal pathology  -transaminitis, also likely 2/2 to low output state, continuing to downtrending  -anticipate improvement with improving hemodynamics  -to continue to trend    ID  -afebrile  -WBCs downtrending   -Feliciano Cx ordered  -UCx grew ESBL, sensitive to penems, TMP-Sulfa, NFT  -Noted to have grown ESBL in previous hospitalization (6/13)  -BCx NGTD  -Started vanc/zosyn (day 2)  -d/c'd zosyn as resistant  -started meropenem 500 BID  -ID consulted. leukocytosis = reactionary  -D/c'd abx given pt is afebrile, leukocytosis resolving, no localizing s&s  -to monitor off abx    Heme  -anemia, chronic  -b/l appx 8-9  -to trend    PPx: HSQ  FEN: none; replete electrolytes PRN; NPO  Code status: Full      Dispo: c/w care on ICU

## 2019-07-12 NOTE — PROGRESS NOTE ADULT - SUBJECTIVE AND OBJECTIVE BOX
Infectious Diseases progress note:    Subjective:  No fevers.  WBC trending down.  Bcx negative.  Off abx as of 7/11.  Family at bedside.     ROS:  CONSTITUTIONAL:  Fatigued  CARDIOVASCULAR:  No chest pain or palpitations  RESPIRATORY:   No SOB, cough, dyspnea on exertion.  No wheezing  GASTROINTESTINAL:  No abd pain, N/V, diarrhea/constipation  EXTREMITIES:  No swelling or joint pain  GENITOURINARY:  No burning on urination, increased frequency or urgency.  No flank pain  NEUROLOGIC:  No HA, visual disturbances  SKIN: No rashes    Allergies    azithromycin (Hives; Pruritus)    Intolerances        ANTIBIOTICS/RELEVANT:  antimicrobials    immunologic:    OTHER:  acetaminophen   Tablet .. 650 milliGRAM(s) Oral every 6 hours PRN  ALPRAZolam 0.25 milliGRAM(s) Oral once  aspirin enteric coated 81 milliGRAM(s) Oral daily  atorvastatin 40 milliGRAM(s) Oral at bedtime  chlorhexidine 2% Cloths 1 Application(s) Topical daily  dextrose 40% Gel 15 Gram(s) Oral once PRN  dextrose 5%. 1000 milliLiter(s) IV Continuous <Continuous>  dextrose 50% Injectable 12.5 Gram(s) IV Push once  dextrose 50% Injectable 25 Gram(s) IV Push once  dextrose 50% Injectable 25 Gram(s) IV Push once  digoxin     Tablet 0.125 milliGRAM(s) Oral daily  docusate sodium 100 milliGRAM(s) Oral two times a day  glucagon  Injectable 1 milliGRAM(s) IntraMuscular once PRN  heparin  Injectable 5000 Unit(s) SubCutaneous every 8 hours  hydrALAZINE 50 milliGRAM(s) Oral every 8 hours  insulin glargine Injectable (LANTUS) 28 Unit(s) SubCutaneous at bedtime  insulin lispro (HumaLOG) corrective regimen sliding scale   SubCutaneous three times a day before meals  insulin lispro (HumaLOG) corrective regimen sliding scale   SubCutaneous at bedtime  insulin lispro Injectable (HumaLOG) 11 Unit(s) SubCutaneous three times a day before meals  levothyroxine 50 MICROGram(s) Oral daily  lidocaine   Patch 1 Patch Transdermal daily  melatonin 3 milliGRAM(s) Oral at bedtime  montelukast 10 milliGRAM(s) Oral daily  pantoprazole    Tablet 40 milliGRAM(s) Oral before breakfast  polyethylene glycol 3350 17 Gram(s) Oral daily  senna 2 Tablet(s) Oral at bedtime  ticagrelor 90 milliGRAM(s) Oral every 12 hours      Objective:  Vital Signs Last 24 Hrs  T(C): 37.1 (12 Jul 2019 09:00), Max: 37.1 (11 Jul 2019 15:00)  T(F): 98.8 (12 Jul 2019 09:00), Max: 98.8 (11 Jul 2019 15:00)  HR: 72 (12 Jul 2019 11:00) (68 - 86)  BP: 110/58 (12 Jul 2019 11:00) (87/46 - 115/57)  BP(mean): 79 (12 Jul 2019 11:00) (64 - 84)  RR: 16 (12 Jul 2019 11:00) (14 - 23)  SpO2: 97% (12 Jul 2019 11:00) (97% - 100%)    PHYSICAL EXAM:  Constitutional:NAD  Eyes:MOHSEN, EOMI  Ear/Nose/Throat: no thrush, mucositis.  Moist mucous membranes	  Neck:no JVD, no lymphadenopathy, supple, Rt IJ central line  Respiratory: CTA maribel  Cardiovascular: S1S2 RRR, no murmurs  Gastrointestinal:soft, nontender,  nondistended (+) BS  Extremities:no e/e/c  Skin:  no rashes, open wounds or ulcerations  :  indwelling Oscar draining clear yellow urine        LABS:                        9.1    11.6  )-----------( 349      ( 12 Jul 2019 04:26 )             27.0     07-12    135  |  100  |  51<H>  ----------------------------<  226<H>  4.2   |  22  |  2.41<H>    Ca    8.5      12 Jul 2019 04:26  Phos  2.6     07-12  Mg     2.3     07-12    TPro  7.9  /  Alb  3.7  /  TBili  0.7  /  DBili  x   /  AST  272<H>  /  ALT  595<H>  /  AlkPhos  149<H>  07-12                Vancomycin Level, Trough: 23.5 ug/mL (07-11 @ 04:35)              MICROBIOLOGY:    Culture - Blood (07.09.19 @ 17:46)    Specimen Source: .Blood    Culture Results:   No growth to date.    Culture - Urine (06.13.19 @ 19:31)    -  Amikacin: S <=16    -  Ampicillin/Sulbactam: R >16/8    -  Ceftriaxone: R >32 Enterobacter, Citrobacter, and Serratia may develop resistance during prolonged therapy    -  Cefepime: R >16    -  Piperacillin/Tazobactam: R >64    -  Tobramycin: R >8    -  Trimethoprim/Sulfamethoxazole: S <=2/38    -  Nitrofurantoin: S <=32 Should not be used to treat pyelonephritis    -  Tigecycline: S <=2    -  Meropenem: S <=1    -  Imipenem: S <=1    -  Levofloxacin: R >4    -  Ertapenem: S <=1    -  Gentamicin: S <=4    -  Ciprofloxacin: R >2    -  Cefoxitin: R >16    -  Cefazolin: R >16 For uncomplicated UTI with K. pneumoniae, E. coli, or P. mirablis: OMARI <=16 is sensitive and OMARI >=32 is resistant. This also predicts results for oral agents cefaclor, cefdinir, cefpodoxime, cefprozil, cefuroxime axetil, cephalexin and locarbef for uncomplicated UTI. Note that some isolates may be susceptible to these agents while testing resistant to cefazolin.    -  Aztreonam: R >16    -  Ampicillin: R >16 These ampicillin results predict results for amoxicillin    Specimen Source: .Urine    Culture Results:   >100,000 CFU/ml Escherichia coli ESBL    Organism Identification: Escherichia coli ESBL    Organism: Escherichia coli ESBL    Method Type: OMARI          RADIOLOGY & ADDITIONAL STUDIES:    < from: Xray Chest 1 View- PORTABLE-Routine (07.11.19 @ 08:47) >  FINDINGS:   Micra clip in situ.  Montgomery-Anig catheter tip has been pulled back -tip now in right main   pulmonary artery.  S/P sternotomy.  There is stable cardiomegaly.  The lungs are clear. No pleural effusion or pneumothorax.  The bony structures are intact.    IMPRESSION:   1. Montgomery-Angi catheter as above.  2. Clear lungs.    < end of copied text >      < from: Xray Chest 1 View- PORTABLE-Routine (07.10.19 @ 09:20) >    Impression:    The heart is enlarged. The lungs appear to be clear. A Montgomery-Angi catheter   is seen on the right and the tip is wedged in the right lower lobe   pulmonary artery. No pneumothorax. Status post sternotomy.    < end of copied text >

## 2019-07-12 NOTE — PHYSICAL THERAPY INITIAL EVALUATION ADULT - GAIT DEVIATIONS NOTED, PT EVAL
decreased velocity of limb motion/decreased step length/decreased weight-shifting ability/decreased sussy

## 2019-07-12 NOTE — CHART NOTE - NSCHARTNOTEFT_GEN_A_CORE
CCU Transfer Note    Transfer from: CCU    Transfer to: (  ) Medicine    (  ) Telemetry    (  ) RCU (  ) Palliative    (  ) Stroke Unit    (  ) MICU    (X ) 605D __________________    Accepting Physician:     Signout given to:     HPI / CCU COURSE:    Miss Gamino is 74 yo woman with PMHx of HTN, T2DM, CAD s/p CABG (LIMA to LAD, SVG to OM, SVG to PDA  at [facility]), non-dilated ICM (EF 20-25%), severe mitral regurgitation s/p mitral clip (19 Dr. Newman), severe pulm HTN, CKD, hypothyroidism, who is presenting to ED after experiencing worsening SOB and intermittent chest pain at [facility]. In ED, pt was placed on BiPAP and given lasix 40mg IVPx1. Transferred to CCU for further management.     The etiology of the patient's respiratory distress was determined to be from her acute decompensated heart failure. The overall goal during the patient's stay was diuresis and support of her hemodynamics when necessary. Her original regiment of lasix did not provide goal UOPs. Then a lasix drip was started at 5, then appropriately up/down titrated as her pressures could tolerate, and as her UOP responded. The patient also required levo 0.04 and dobutamine (max of 5). Her SVR was elevated so levo was discontinued. The patient had improved hemodynamics and UOP. A swan was eventually placed for better hemodynamic monitoring. The patient's hemodynamics continued to improve with diuresis, to the point that her dobutamine was successfully weaned off. The patient's volume status/CVP also improved to the point where lasix gtt was discontinued, and the swan was d/c'd. For now the patient should still receive some dose of lasix, with volume status trended.     The other issues during this stay in the ICU were a leukocytosis, and positive urine cultures for ESBL. The patient received a dose of vancomycin, and several doses of zosyn. After the ESBL sensitivities came back, the patient was briefly placed on meropenem (renally dosed). ID was consulted. However, because the patient remained afebrile, and WBC count was declining, the ID team felt that the leukocytosis was reactionary. The patient was d/c'd off abx, and her WBC count is still declining and she still remains afebrile. The patient also developed a transaminitis, thought to be 2/2 low flow state, which is downtrending. The patient's Cr is also slightly elevated from her new supposed baseline, but is now stable. The patient during her stay complained of abdominal pain/ which an KUB rueld out obstruction. Now improved.     The patient's family approached the primary care team expressing that they do not wish for advanced life support. However, the family is afraid to have this discussion with the patient. An agreement was made to have the family meet with palliative to discuss the patient's wishes, in addition to having their input for management of the patient's comfort at home. Meeting desired for 7/15 at 1PM.      Vital Signs Last 24 Hrs  T(C): 36.9 (2019 14:00), Max: 37.1 (2019 18:00)  T(F): 98.4 (2019 14:00), Max: 98.8 (2019 18:00)  HR: 76 (2019 16:00) (68 - 86)  BP: 93/43 (2019 16:00) (89/38 - 127/79)  BP(mean): 68 (2019 16:00) (60 - 100)  RR: 14 (2019 16:00) (14 - 23)  SpO2: 98% (2019 16:00) (97% - 100%)    I&O's Summary    2019 07:  -  2019 07:00  --------------------------------------------------------  IN: 508.4 mL / OUT: 1310 mL / NET: -801.6 mL    2019 07:01  -  2019 17:12  --------------------------------------------------------  IN: 182.5 mL / OUT: 390 mL / NET: -207.5 mL      Physical Exam:       LABS:                    9.1    11.6  )-----------( 349      ( 2019 04:26 )             27.0           135  |  100  |  51<H>  ----------------------------<  226<H>  4.2   |  22  |  2.41<H>    Ca    8.5      2019 04:26  Phos  2.6       Mg     2.3         TPro  7.9  /  Alb  3.7  /  TBili  0.7  /  DBili  x   /  AST  272<H>  /  ALT  595<H>  /  AlkPhos  149<H>        ECG:  NSR    Telemetry:  NSR. No events of SVTs for >2 days    Imagin/5 TTE: EF 15-20%, mild MR, clip in mitral posistion. Sev. global LV systolic dysfunction. Mod pulmonary pressures   CXR: unremarable   ECHOL Severe global LV dysfunction. elevated RV pressures c/w severe pHTN, mild-mod TR   CXR: clear lungs, swan in place    ASSESSMENT & PLAN:     Miss Gamino is 74 yo woman with PMHx of HTN, T2DM, CAD s/p CABG (LIMA to LAD, SVG to OM, SVG to PDA  at [facility]), non-dilated ICM (EF 20-25%), severe mitral regurgitation s/p mitral clip (19 Dr. Newman), severe pulm HTN, CKD, hypothyroidism, who is presenting to ED after experiencing worsening SOB and intermittent chest pain at [facility]. In ED, pt was placed on BiPAP and given lasix 40mg IVPx1. Transferred to CCU for further management. Undergoing diuresis, now euvolemic and with improved hemodynamics. Found to have a leukocytosis, thought to be reactive, declining off antibiotics. Family is interested in meeting with palliative on Monday at 1PM    Cardiac  Acute decompensated heart failure/volume   -TTE  showed EF 15-20% with global LV systolic dysfunction  -Off lasix gtt  -lasix 40 IV daily  -Strict I/O, trend weights  -Cardiac enzymes plateau'd    Cardioprotection  -ASA, brilinta, statin  -d/c'd lopressor 12.5  -off ACE 2/2 pressures  -DM management as below  -to trend bp's    Hemodynamics  -d'c;d vaso as elevated SVR point's to elevated afterload  -d/c'd beta blocker as above  -d/c'd dobutamine  -d/c'd swan due to improving hemodynamics/CVP at goal  -up to hydralizine 50 TID for afterload reduction  -continue to monitor hemodynamics     EP  -now NSR, no SVTs  -dig 0.125 po  -EP consulted, no amiodarone 2/2 uptrending LFTs  -outpt followup for ICD implant for primary prevention  -On tele, has been quiet for several days, can now d/c  -Keep K>4 and Mg>2  -replete electrolytes as needed    Pulmonary  -acute respiratory distress 2/2 to ADHF, resolved  -off bi-pap  -sating well on room air  -continue to trend SpO2    Endo  DM, hyperglycemia  -Hgb A1C 7.4  -started Lantus 25U at bedtime  -started ISS pre meal and at bedtime  -Started lispro 10U pre meal    Hypothyroidism  -On synthroid  -TSH 2.0  -T4, Serum: 6.8 ug/dL (19 @ 15:17)  -stable    Renal, CKD IV   -New baseline of 1.8-2.0 as per nephro  -Elevated, but stable  -Cr starting to downtrend  -improving given improved hemodynamics  -To trend renal function and avoid nephrotoxic meds    GI  -had abdominal pain, now resolved  -likely 2/2 to bowel edema/ischemia 2/2 to low CO/CI  -KUB (-) for acute abdominal pathology  -transaminitis, also likely 2/2 to low output state, continuing to downtrend  -anticipate improvement with improving hemodynamics  -to continue to trend    ID  -afebrile  -WBCs downtrending   -Feliciano Cx ordered  -UCx grew ESBL, sensitive to penems, TMP-Sulfa, NFT  -Noted to have grown ESBL in previous hospitalization ()  -BCx NGTD  -Started vanc/zosyn (day 2)  -d/c'd zosyn as resistant  -started meropenem 500 BID  -ID consulted. leukocytosis = reactionary  -D/c'd abx given pt is afebrile, leukocytosis resolving, no localizing s&s  -WBC still declining off abx, still afebrile  -to continue to monitor    Heme  -anemia, chronic  -baseline appx 8-9  -stable  -to trend    PPx: HSQ  FEN: none; replete electrolytes PRN; NPO  Code status: Full      Dispo: stable. to transfer off the unit      FOR FOLLOW UP:  [ ] trend I/O  [ ] follow BMP (for K,Mg, Cr) and LFTs (for transaminitis) and WBC  [ ] palliative consulted. to anticipate meeting Monday at 1PM with family present    Maximiliano Tanner MD PGY1  [phone] CCU Transfer Note    Transfer from: CCU    Transfer to: (  ) Medicine    (  ) Telemetry    (  ) RCU (  ) Palliative    (  ) Stroke Unit    (  ) MICU    (X ) 605D __________________    Accepting Physician: Dr. Salazar    Signout given to: Dr. Salazar     HPI / CCU COURSE:    Miss Gamino is 72 yo woman with PMHx of HTN, T2DM, CAD s/p CABG (LIMA to LAD, SVG to OM, SVG to PDA  at [facility]), non-dilated ICM (EF 20-25%), severe mitral regurgitation s/p mitral clip (19 Dr. Newman), severe pulm HTN, CKD, hypothyroidism, who is presenting to ED after experiencing worsening SOB and intermittent chest pain at [facility]. In ED, pt was placed on BiPAP and given lasix 40mg IVPx1. Transferred to CCU for further management.     The etiology of the patient's respiratory distress was determined to be from her acute decompensated heart failure. The overall goal during the patient's stay was diuresis and support of her hemodynamics when necessary. Her original regiment of lasix did not provide goal UOPs. Then a lasix drip was started at 5, then appropriately up/down titrated as her pressures could tolerate, and as her UOP responded. The patient also required levo 0.04 and dobutamine (max of 5). Her SVR was elevated so levo was discontinued. The patient had improved hemodynamics and UOP. A swan was eventually placed for better hemodynamic monitoring. The patient's hemodynamics continued to improve with diuresis, to the point that her dobutamine was successfully weaned off. The patient's volume status/CVP also improved to the point where lasix gtt was discontinued, and the swan was d/c'd. For now the patient should still receive some dose of lasix, with volume status trended.     The other issues during this stay in the ICU were a leukocytosis, and positive urine cultures for ESBL. The patient received a dose of vancomycin, and several doses of zosyn. After the ESBL sensitivities came back, the patient was briefly placed on meropenem (renally dosed). ID was consulted. However, because the patient remained afebrile, and WBC count was declining, the ID team felt that the leukocytosis was reactionary. The patient was d/c'd off abx, and her WBC count is still declining and she still remains afebrile. The patient also developed a transaminitis, thought to be 2/2 low flow state, which is downtrending. The patient's Cr is also slightly elevated from her new supposed baseline, but is now stable. The patient during her stay complained of abdominal pain/ which an KUB rueld out obstruction. Now improved.     The patient's family approached the primary care team expressing that they do not wish for advanced life support. However, the family is afraid to have this discussion with the patient. An agreement was made to have the family meet with palliative to discuss the patient's wishes, in addition to having their input for management of the patient's comfort at home. Meeting desired for 7/15 at 1PM.      Vital Signs Last 24 Hrs  T(C): 36.9 (2019 14:00), Max: 37.1 (2019 18:00)  T(F): 98.4 (2019 14:00), Max: 98.8 (2019 18:00)  HR: 76 (2019 16:00) (68 - 86)  BP: 93/43 (2019 16:00) (89/38 - 127/79)  BP(mean): 68 (2019 16:00) (60 - 100)  RR: 14 (2019 16:00) (14 - 23)  SpO2: 98% (2019 16:00) (97% - 100%)    I&O's Summary    2019 07:  -  2019 07:00  --------------------------------------------------------  IN: 508.4 mL / OUT: 1310 mL / NET: -801.6 mL    2019 07:01  -  2019 17:12  --------------------------------------------------------  IN: 182.5 mL / OUT: 390 mL / NET: -207.5 mL      Physical Exam:       LABS:                    9.1    11.6  )-----------( 349      ( 2019 04:26 )             27.0       07-12    135  |  100  |  51<H>  ----------------------------<  226<H>  4.2   |  22  |  2.41<H>    Ca    8.5      2019 04:26  Phos  2.6       Mg     2.3         TPro  7.9  /  Alb  3.7  /  TBili  0.7  /  DBili  x   /  AST  272<H>  /  ALT  595<H>  /  AlkPhos  149<H>        ECG:  NSR    Telemetry:  NSR. No events of SVTs for >2 days    Imagin/5 TTE: EF 15-20%, mild MR, clip in mitral posistion. Sev. global LV systolic dysfunction. Mod pulmonary pressures   CXR: unremarable   ECHOL Severe global LV dysfunction. elevated RV pressures c/w severe pHTN, mild-mod TR   CXR: clear lungs, swan in place    ASSESSMENT & PLAN:     Miss Gamino is 72 yo woman with PMHx of HTN, T2DM, CAD s/p CABG (LIMA to LAD, SVG to OM, SVG to PDA  at [facility]), non-dilated ICM (EF 20-25%), severe mitral regurgitation s/p mitral clip (19 Dr. Newman), severe pulm HTN, CKD, hypothyroidism, who is presenting to ED after experiencing worsening SOB and intermittent chest pain at [facility]. In ED, pt was placed on BiPAP and given lasix 40mg IVPx1. Transferred to CCU for further management. Undergoing diuresis, now euvolemic and with improved hemodynamics. Found to have a leukocytosis, thought to be reactive, declining off antibiotics. Family is interested in meeting with palliative on Monday at 1PM    Cardiac  Acute decompensated heart failure/volume   -TTE  showed EF 15-20% with global LV systolic dysfunction  -Off lasix gtt  -lasix 40 IV daily  -Strict I/O, trend weights  -Cardiac enzymes plateau'd    Cardioprotection  -ASA, brilinta, statin  -d/c'd lopressor 12.5  -off ACE 2/2 pressures  -DM management as below  -to trend bp's    Hemodynamics  -d'c;d vaso as elevated SVR point's to elevated afterload  -d/c'd beta blocker as above  -d/c'd dobutamine  -d/c'd swan due to improving hemodynamics/CVP at goal  -up to hydralizine 50 TID for afterload reduction  -continue to monitor hemodynamics     EP  -now NSR, no SVTs  -dig 0.125 po  -EP consulted, no amiodarone 2/2 uptrending LFTs  -outpt followup for ICD implant for primary prevention  -On tele, has been quiet for several days, can now d/c  -Keep K>4 and Mg>2  -replete electrolytes as needed    Pulmonary  -acute respiratory distress 2/2 to ADHF, resolved  -off bi-pap  -sating well on room air  -continue to trend SpO2    Endo  DM, hyperglycemia  -Hgb A1C 7.4  -started Lantus 25U at bedtime  -started ISS pre meal and at bedtime  -Started lispro 10U pre meal    Hypothyroidism  -On synthroid  -TSH 2.0  -T4, Serum: 6.8 ug/dL (19 @ 15:17)  -stable    Renal, CKD IV   -New baseline of 1.8-2.0 as per nephro  -Elevated, but stable  -Cr starting to downtrend  -improving given improved hemodynamics  -To trend renal function and avoid nephrotoxic meds    GI  -had abdominal pain, now resolved  -likely 2/2 to bowel edema/ischemia 2/2 to low CO/CI  -KUB (-) for acute abdominal pathology  -transaminitis, also likely 2/2 to low output state, continuing to downtrend  -anticipate improvement with improving hemodynamics  -to continue to trend    ID  -afebrile  -WBCs downtrending   -Feliciano Cx ordered  -UCx grew ESBL, sensitive to penems, TMP-Sulfa, NFT  -Noted to have grown ESBL in previous hospitalization ()  -BCx NGTD  -Started vanc/zosyn (day 2)  -d/c'd zosyn as resistant  -started meropenem 500 BID  -ID consulted. leukocytosis = reactionary  -D/c'd abx given pt is afebrile, leukocytosis resolving, no localizing s&s  -WBC still declining off abx, still afebrile  -to continue to monitor    Heme  -anemia, chronic  -baseline appx 8-9  -stable  -to trend    PPx: HSQ  FEN: none; replete electrolytes PRN; NPO  Code status: Full      Dispo: stable. to transfer off the unit      FOR FOLLOW UP:  [ ] trend I/O  [ ] follow BMP (for K,Mg, Cr) and LFTs (for transaminitis) and WBC  [ ] palliative consulted. to anticipate meeting Monday at 1PM with family present    Maximiliano Tanner MD PGY1  [phone]

## 2019-07-12 NOTE — PHYSICAL THERAPY INITIAL EVALUATION ADULT - PRECAUTIONS/LIMITATIONS, REHAB EVAL
Also complaining of intermittent chest pain. Of note, pt was recently discharged on 6/19 after having mitral clip procedure completed. Pt says that she has been experiencing SOB for about 1 week. However, she was never noted to be hypoxic in rehab until today.  he was not able to provide much hx 2/2 SOB and being on bipap. Daughter at bedside reporting that pt was well immediately after discharge. However, she gradually developed worsening SOB. Better with rest initially. Nothing alleviating SOB now.  It is constant throughout the day, made worse with exertion.  In the ED, pt afebrile, , /75, RR 22, O2 100% on 2L NC. She subsequently developed worsened work of breathing and was placed on bipap with improvement.

## 2019-07-13 LAB
ALBUMIN SERPL ELPH-MCNC: 3.9 G/DL — SIGNIFICANT CHANGE UP (ref 3.3–5)
ALBUMIN SERPL ELPH-MCNC: 4 G/DL — SIGNIFICANT CHANGE UP (ref 3.3–5)
ALP SERPL-CCNC: 164 U/L — HIGH (ref 40–120)
ALP SERPL-CCNC: 175 U/L — HIGH (ref 40–120)
ALT FLD-CCNC: 380 U/L — HIGH (ref 10–45)
ALT FLD-CCNC: 403 U/L — HIGH (ref 10–45)
ANION GAP SERPL CALC-SCNC: 16 MMOL/L — SIGNIFICANT CHANGE UP (ref 5–17)
ANION GAP SERPL CALC-SCNC: 18 MMOL/L — HIGH (ref 5–17)
APTT BLD: 135.6 SEC — CRITICAL HIGH (ref 27.5–36.3)
APTT BLD: 95.5 SEC — HIGH (ref 27.5–36.3)
AST SERPL-CCNC: 120 U/L — HIGH (ref 10–40)
AST SERPL-CCNC: 122 U/L — HIGH (ref 10–40)
BASOPHILS # BLD AUTO: 0.05 K/UL — SIGNIFICANT CHANGE UP (ref 0–0.2)
BASOPHILS NFR BLD AUTO: 0.5 % — SIGNIFICANT CHANGE UP (ref 0–2)
BILIRUB SERPL-MCNC: 0.6 MG/DL — SIGNIFICANT CHANGE UP (ref 0.2–1.2)
BILIRUB SERPL-MCNC: 0.6 MG/DL — SIGNIFICANT CHANGE UP (ref 0.2–1.2)
BUN SERPL-MCNC: 56 MG/DL — HIGH (ref 7–23)
BUN SERPL-MCNC: 57 MG/DL — HIGH (ref 7–23)
CALCIUM SERPL-MCNC: 8.6 MG/DL — SIGNIFICANT CHANGE UP (ref 8.4–10.5)
CALCIUM SERPL-MCNC: 9 MG/DL — SIGNIFICANT CHANGE UP (ref 8.4–10.5)
CHLORIDE SERPL-SCNC: 100 MMOL/L — SIGNIFICANT CHANGE UP (ref 96–108)
CHLORIDE SERPL-SCNC: 98 MMOL/L — SIGNIFICANT CHANGE UP (ref 96–108)
CK MB CFR SERPL CALC: 3.8 NG/ML — SIGNIFICANT CHANGE UP (ref 0–3.8)
CK MB CFR SERPL CALC: 3.9 NG/ML — HIGH (ref 0–3.8)
CK SERPL-CCNC: 53 U/L — SIGNIFICANT CHANGE UP (ref 25–170)
CK SERPL-CCNC: 61 U/L — SIGNIFICANT CHANGE UP (ref 25–170)
CO2 SERPL-SCNC: 21 MMOL/L — LOW (ref 22–31)
CO2 SERPL-SCNC: 22 MMOL/L — SIGNIFICANT CHANGE UP (ref 22–31)
CREAT SERPL-MCNC: 2.72 MG/DL — HIGH (ref 0.5–1.3)
CREAT SERPL-MCNC: 2.72 MG/DL — HIGH (ref 0.5–1.3)
EOSINOPHIL # BLD AUTO: 0.42 K/UL — SIGNIFICANT CHANGE UP (ref 0–0.5)
EOSINOPHIL NFR BLD AUTO: 4.4 % — SIGNIFICANT CHANGE UP (ref 0–6)
GLUCOSE BLDC GLUCOMTR-MCNC: 180 MG/DL — HIGH (ref 70–99)
GLUCOSE BLDC GLUCOMTR-MCNC: 217 MG/DL — HIGH (ref 70–99)
GLUCOSE BLDC GLUCOMTR-MCNC: 236 MG/DL — HIGH (ref 70–99)
GLUCOSE BLDC GLUCOMTR-MCNC: 269 MG/DL — HIGH (ref 70–99)
GLUCOSE SERPL-MCNC: 197 MG/DL — HIGH (ref 70–99)
GLUCOSE SERPL-MCNC: 220 MG/DL — HIGH (ref 70–99)
HCT VFR BLD CALC: 30.3 % — LOW (ref 34.5–45)
HGB BLD-MCNC: 9.1 G/DL — LOW (ref 11.5–15.5)
IMM GRANULOCYTES NFR BLD AUTO: 3.5 % — HIGH (ref 0–1.5)
INR BLD: 1.04 RATIO — SIGNIFICANT CHANGE UP (ref 0.88–1.16)
INR BLD: 1.04 RATIO — SIGNIFICANT CHANGE UP (ref 0.88–1.16)
LYMPHOCYTES # BLD AUTO: 0.87 K/UL — LOW (ref 1–3.3)
LYMPHOCYTES # BLD AUTO: 9.1 % — LOW (ref 13–44)
MAGNESIUM SERPL-MCNC: 2.5 MG/DL — SIGNIFICANT CHANGE UP (ref 1.6–2.6)
MAGNESIUM SERPL-MCNC: 2.6 MG/DL — SIGNIFICANT CHANGE UP (ref 1.6–2.6)
MCHC RBC-ENTMCNC: 27.2 PG — SIGNIFICANT CHANGE UP (ref 27–34)
MCHC RBC-ENTMCNC: 30 GM/DL — LOW (ref 32–36)
MCV RBC AUTO: 90.4 FL — SIGNIFICANT CHANGE UP (ref 80–100)
MONOCYTES # BLD AUTO: 0.8 K/UL — SIGNIFICANT CHANGE UP (ref 0–0.9)
MONOCYTES NFR BLD AUTO: 8.4 % — SIGNIFICANT CHANGE UP (ref 2–14)
NEUTROPHILS # BLD AUTO: 7.1 K/UL — SIGNIFICANT CHANGE UP (ref 1.8–7.4)
NEUTROPHILS NFR BLD AUTO: 74.1 % — SIGNIFICANT CHANGE UP (ref 43–77)
PHOSPHATE SERPL-MCNC: 3.7 MG/DL — SIGNIFICANT CHANGE UP (ref 2.5–4.5)
PHOSPHATE SERPL-MCNC: 3.8 MG/DL — SIGNIFICANT CHANGE UP (ref 2.5–4.5)
PLATELET # BLD AUTO: 346 K/UL — SIGNIFICANT CHANGE UP (ref 150–400)
POTASSIUM SERPL-MCNC: 4.2 MMOL/L — SIGNIFICANT CHANGE UP (ref 3.5–5.3)
POTASSIUM SERPL-MCNC: 4.4 MMOL/L — SIGNIFICANT CHANGE UP (ref 3.5–5.3)
POTASSIUM SERPL-SCNC: 4.2 MMOL/L — SIGNIFICANT CHANGE UP (ref 3.5–5.3)
POTASSIUM SERPL-SCNC: 4.4 MMOL/L — SIGNIFICANT CHANGE UP (ref 3.5–5.3)
PROT SERPL-MCNC: 7.9 G/DL — SIGNIFICANT CHANGE UP (ref 6–8.3)
PROT SERPL-MCNC: 8.1 G/DL — SIGNIFICANT CHANGE UP (ref 6–8.3)
PROTHROM AB SERPL-ACNC: 11.9 SEC — SIGNIFICANT CHANGE UP (ref 10–12.9)
PROTHROM AB SERPL-ACNC: 11.9 SEC — SIGNIFICANT CHANGE UP (ref 10–12.9)
RBC # BLD: 3.35 M/UL — LOW (ref 3.8–5.2)
RBC # FLD: 18.6 % — HIGH (ref 10.3–14.5)
SODIUM SERPL-SCNC: 137 MMOL/L — SIGNIFICANT CHANGE UP (ref 135–145)
SODIUM SERPL-SCNC: 138 MMOL/L — SIGNIFICANT CHANGE UP (ref 135–145)
TROPONIN T, HIGH SENSITIVITY RESULT: 604 NG/L — HIGH (ref 0–51)
TROPONIN T, HIGH SENSITIVITY RESULT: 650 NG/L — HIGH (ref 0–51)
WBC # BLD: 9.58 K/UL — SIGNIFICANT CHANGE UP (ref 3.8–10.5)
WBC # FLD AUTO: 9.58 K/UL — SIGNIFICANT CHANGE UP (ref 3.8–10.5)

## 2019-07-13 PROCEDURE — 93010 ELECTROCARDIOGRAM REPORT: CPT

## 2019-07-13 RX ORDER — CLOPIDOGREL BISULFATE 75 MG/1
600 TABLET, FILM COATED ORAL ONCE
Refills: 0 | Status: COMPLETED | OUTPATIENT
Start: 2019-07-14 | End: 2019-07-14

## 2019-07-13 RX ORDER — CLOPIDOGREL BISULFATE 75 MG/1
75 TABLET, FILM COATED ORAL DAILY
Refills: 0 | Status: DISCONTINUED | OUTPATIENT
Start: 2019-07-15 | End: 2019-09-05

## 2019-07-13 RX ORDER — HEPARIN SODIUM 5000 [USP'U]/ML
INJECTION INTRAVENOUS; SUBCUTANEOUS
Qty: 25000 | Refills: 0 | Status: DISCONTINUED | OUTPATIENT
Start: 2019-07-13 | End: 2019-07-13

## 2019-07-13 RX ORDER — HEPARIN SODIUM 5000 [USP'U]/ML
4000 INJECTION INTRAVENOUS; SUBCUTANEOUS EVERY 6 HOURS
Refills: 0 | Status: DISCONTINUED | OUTPATIENT
Start: 2019-07-13 | End: 2019-07-15

## 2019-07-13 RX ORDER — HEPARIN SODIUM 5000 [USP'U]/ML
2000 INJECTION INTRAVENOUS; SUBCUTANEOUS EVERY 6 HOURS
Refills: 0 | Status: DISCONTINUED | OUTPATIENT
Start: 2019-07-13 | End: 2019-07-15

## 2019-07-13 RX ORDER — ONDANSETRON 8 MG/1
4 TABLET, FILM COATED ORAL ONCE
Refills: 0 | Status: COMPLETED | OUTPATIENT
Start: 2019-07-13 | End: 2019-07-13

## 2019-07-13 RX ADMIN — Medication 81 MILLIGRAM(S): at 08:37

## 2019-07-13 RX ADMIN — Medication 0.12 MILLIGRAM(S): at 05:45

## 2019-07-13 RX ADMIN — LIDOCAINE 1 PATCH: 4 CREAM TOPICAL at 11:22

## 2019-07-13 RX ADMIN — Medication 1: at 17:17

## 2019-07-13 RX ADMIN — PANTOPRAZOLE SODIUM 40 MILLIGRAM(S): 20 TABLET, DELAYED RELEASE ORAL at 05:45

## 2019-07-13 RX ADMIN — TICAGRELOR 90 MILLIGRAM(S): 90 TABLET ORAL at 17:17

## 2019-07-13 RX ADMIN — Medication 40 MILLIGRAM(S): at 05:45

## 2019-07-13 RX ADMIN — LIDOCAINE 1 PATCH: 4 CREAM TOPICAL at 07:37

## 2019-07-13 RX ADMIN — INSULIN GLARGINE 28 UNIT(S): 100 INJECTION, SOLUTION SUBCUTANEOUS at 21:44

## 2019-07-13 RX ADMIN — ATORVASTATIN CALCIUM 40 MILLIGRAM(S): 80 TABLET, FILM COATED ORAL at 21:27

## 2019-07-13 RX ADMIN — Medication 50 MILLIGRAM(S): at 21:27

## 2019-07-13 RX ADMIN — Medication 11 UNIT(S): at 12:31

## 2019-07-13 RX ADMIN — Medication 50 MICROGRAM(S): at 05:45

## 2019-07-13 RX ADMIN — Medication 100 MILLIGRAM(S): at 17:17

## 2019-07-13 RX ADMIN — ONDANSETRON 4 MILLIGRAM(S): 8 TABLET, FILM COATED ORAL at 06:26

## 2019-07-13 RX ADMIN — Medication 11 UNIT(S): at 17:17

## 2019-07-13 RX ADMIN — Medication 3 MILLIGRAM(S): at 21:27

## 2019-07-13 RX ADMIN — SENNA PLUS 2 TABLET(S): 8.6 TABLET ORAL at 21:27

## 2019-07-13 RX ADMIN — Medication 1: at 21:44

## 2019-07-13 RX ADMIN — LIDOCAINE 1 PATCH: 4 CREAM TOPICAL at 21:28

## 2019-07-13 RX ADMIN — Medication 2: at 12:31

## 2019-07-13 RX ADMIN — Medication 50 MILLIGRAM(S): at 05:45

## 2019-07-13 RX ADMIN — Medication 2: at 08:36

## 2019-07-13 RX ADMIN — Medication 100 MILLIGRAM(S): at 05:45

## 2019-07-13 RX ADMIN — HEPARIN SODIUM 5000 UNIT(S): 5000 INJECTION INTRAVENOUS; SUBCUTANEOUS at 12:31

## 2019-07-13 RX ADMIN — Medication 11 UNIT(S): at 08:36

## 2019-07-13 RX ADMIN — HEPARIN SODIUM 5000 UNIT(S): 5000 INJECTION INTRAVENOUS; SUBCUTANEOUS at 05:45

## 2019-07-13 RX ADMIN — Medication 50 MILLIGRAM(S): at 12:32

## 2019-07-13 RX ADMIN — MONTELUKAST 10 MILLIGRAM(S): 4 TABLET, CHEWABLE ORAL at 08:37

## 2019-07-13 RX ADMIN — TICAGRELOR 90 MILLIGRAM(S): 90 TABLET ORAL at 05:45

## 2019-07-13 NOTE — PROGRESS NOTE ADULT - SUBJECTIVE AND OBJECTIVE BOX
Infectious Diseases progress note:    Subjective: Events noted.  No new fever or leukocytosis.  mickey Oscar has been removed, has good urine outpt.  Pt moved to tele floor.  d/w RN.  No abd pain/diarrhea    ROS:  CONSTITUTIONAL:  No fever, chills, rigors  CARDIOVASCULAR:  No chest pain or palpitations  RESPIRATORY:   No SOB, cough, dyspnea on exertion.  No wheezing  GASTROINTESTINAL:  No abd pain, N/V, diarrhea/constipation  EXTREMITIES:  No swelling or joint pain  GENITOURINARY:  No burning on urination, increased frequency or urgency.  No flank pain  NEUROLOGIC:  No HA, visual disturbances  SKIN: No rashes    Allergies    azithromycin (Hives; Pruritus)    Intolerances        ANTIBIOTICS/RELEVANT:  antimicrobials    immunologic:    OTHER:  acetaminophen   Tablet .. 650 milliGRAM(s) Oral every 6 hours PRN  aspirin enteric coated 81 milliGRAM(s) Oral daily  atorvastatin 40 milliGRAM(s) Oral at bedtime  chlorhexidine 2% Cloths 1 Application(s) Topical daily  dextrose 40% Gel 15 Gram(s) Oral once PRN  dextrose 5%. 1000 milliLiter(s) IV Continuous <Continuous>  dextrose 50% Injectable 12.5 Gram(s) IV Push once  dextrose 50% Injectable 25 Gram(s) IV Push once  dextrose 50% Injectable 25 Gram(s) IV Push once  digoxin     Tablet 0.125 milliGRAM(s) Oral daily  docusate sodium 100 milliGRAM(s) Oral two times a day  furosemide   Injectable 40 milliGRAM(s) IV Push daily  glucagon  Injectable 1 milliGRAM(s) IntraMuscular once PRN  heparin  Injectable 5000 Unit(s) SubCutaneous every 8 hours  hydrALAZINE 50 milliGRAM(s) Oral every 8 hours  insulin glargine Injectable (LANTUS) 28 Unit(s) SubCutaneous at bedtime  insulin lispro (HumaLOG) corrective regimen sliding scale   SubCutaneous three times a day before meals  insulin lispro (HumaLOG) corrective regimen sliding scale   SubCutaneous at bedtime  insulin lispro Injectable (HumaLOG) 11 Unit(s) SubCutaneous three times a day before meals  levothyroxine 50 MICROGram(s) Oral daily  lidocaine   Patch 1 Patch Transdermal daily  melatonin 3 milliGRAM(s) Oral at bedtime  montelukast 10 milliGRAM(s) Oral daily  pantoprazole    Tablet 40 milliGRAM(s) Oral before breakfast  polyethylene glycol 3350 17 Gram(s) Oral daily  senna 2 Tablet(s) Oral at bedtime  ticagrelor 90 milliGRAM(s) Oral every 12 hours      Objective:  Vital Signs Last 24 Hrs  T(C): 36.9 (13 Jul 2019 13:14), Max: 36.9 (12 Jul 2019 17:00)  T(F): 98.4 (13 Jul 2019 13:14), Max: 98.4 (12 Jul 2019 17:00)  HR: 82 (13 Jul 2019 13:14) (69 - 120)  BP: 103/59 (13 Jul 2019 13:14) (90/62 - 112/62)  BP(mean): 71 (12 Jul 2019 18:00) (60 - 75)  RR: 18 (13 Jul 2019 13:14) (14 - 18)  SpO2: 99% (13 Jul 2019 13:14) (98% - 100%)    PHYSICAL EXAM:  Constitutional:NAD  Eyes:MOHSEN, EOMI  Ear/Nose/Throat: no thrush, mucositis.  Moist mucous membranes	  Neck:no JVD, no lymphadenopathy, supple  Respiratory: CTA maribel  Cardiovascular: S1S2 RRR, no murmurs  Gastrointestinal:soft, nontender,  nondistended (+) BS  Extremities:no e/e/c  Skin:  no rashes, open wounds or ulcerations        LABS:                        9.1    9.58  )-----------( 346      ( 13 Jul 2019 11:26 )             30.3     07-13    138  |  98  |  56<H>  ----------------------------<  197<H>  4.4   |  22  |  2.72<H>    Ca    8.6      13 Jul 2019 07:09  Phos  3.8     07-13  Mg     2.5     07-13    TPro  7.9  /  Alb  3.9  /  TBili  0.6  /  DBili  x   /  AST  122<H>  /  ALT  380<H>  /  AlkPhos  164<H>  07-13                Vancomycin Level, Trough: 23.5 ug/mL (07-11 @ 04:35)              MICROBIOLOGY:    Culture - Blood (07.09.19 @ 17:46)    Specimen Source: .Blood    Culture Results:   No growth to date.        RADIOLOGY & ADDITIONAL STUDIES:    < from: Xray Chest 1 View- PORTABLE-Routine (07.12.19 @ 10:55) >    IMPRESSION:   1.  Interval marginal advancement of the Muse-Angi catheter, which   remains located in the right pulmonary artery.  2.  Clear lungs.    < end of copied text >

## 2019-07-13 NOTE — CHART NOTE - NSCHARTNOTEFT_GEN_A_CORE
Pt without pain or nausea this evening. Able to tolerate dinner. Cardiology recommends heparin gtt as new aflutter, elevated trops. Discussed with Dr. Salazar who agrees with plan for now. To monitor closely for bleeding. Follow up with cardiology and CHF. Pt without pain or nausea this evening. Able to tolerate dinner. As per Cardiology note, recommending heparin gtt as new aflutter, elevated trops. Discussed with cardiology fellow, Dr. Riojas to confirm plan. Pt to be on triple therapy so advised to change to plavix from Brilinta. Last dose of Brilinta given at 5pm 7/13/14. Will give plavix loading dose in am as per Dr. Riojas, and begin plavix maintenance on 7/15/19.  Discussed with Dr. Salazar who agrees with plan for now. To monitor closely for bleeding. Follow up with cardiology and CHF.

## 2019-07-13 NOTE — CHART NOTE - NSCHARTNOTEFT_GEN_A_CORE
patient with noted elevated trop to 650 this am. CP/SVT overnight. Pt without complaints now, states she is " just tired" . Denies CP, SOB, lightheadness, n/v, abdominal pain, diaphoresis. Discussed case with Dr. Agosto who states patient likely not a candidate for revisualization. Will trend enzymes for now. Continue current management. Cardiology to follow up. patient with noted elevated trop to 650 this am. CP/SVT overnight. Pt without complaints now, states she is " just tired" . Denies CP, SOB, lightheadness, n/v, abdominal pain, diaphoresis. Discussed case with Dr. Agosto who states patient likely not a candidate for revascularization. Will trend enzymes for now. Continue current management. Cardiology to follow up.

## 2019-07-13 NOTE — PROVIDER CONTACT NOTE (CRITICAL VALUE NOTIFICATION) - BACKGROUND
Pt came in with acute on chronic systolic CHF. Pt has a hx of GERD, CAD, HLD, hypothyroidism, and DM type II.

## 2019-07-13 NOTE — PROGRESS NOTE ADULT - ATTENDING COMMENTS
Patient seen and examined. Agree with assessment and plan as outlined above. Continue management as stated above. Patient known to heart failure team and would benefit from their involvement.

## 2019-07-13 NOTE — PROGRESS NOTE ADULT - SUBJECTIVE AND OBJECTIVE BOX
SELVIN CLAIRE  73y  Patient is a 73y old  Female who presents with a chief complaint of Hypoxia, SOB (13 Jul 2019 15:08)    HPI:  This is a patient followed for MERVIN on CKD. Has CHF decompensation.    HEALTH ISSUES - PROBLEM Dx:  Fluid overload: Fluid overload        MEDICATIONS  (STANDING):  aspirin enteric coated 81 milliGRAM(s) Oral daily  atorvastatin 40 milliGRAM(s) Oral at bedtime  chlorhexidine 2% Cloths 1 Application(s) Topical daily  dextrose 5%. 1000 milliLiter(s) (50 mL/Hr) IV Continuous <Continuous>  dextrose 50% Injectable 12.5 Gram(s) IV Push once  dextrose 50% Injectable 25 Gram(s) IV Push once  dextrose 50% Injectable 25 Gram(s) IV Push once  digoxin     Tablet 0.125 milliGRAM(s) Oral daily  docusate sodium 100 milliGRAM(s) Oral two times a day  furosemide   Injectable 40 milliGRAM(s) IV Push daily  heparin  Infusion.  Unit(s)/Hr (10 mL/Hr) IV Continuous <Continuous>  hydrALAZINE 50 milliGRAM(s) Oral every 8 hours  insulin glargine Injectable (LANTUS) 28 Unit(s) SubCutaneous at bedtime  insulin lispro (HumaLOG) corrective regimen sliding scale   SubCutaneous three times a day before meals  insulin lispro (HumaLOG) corrective regimen sliding scale   SubCutaneous at bedtime  insulin lispro Injectable (HumaLOG) 11 Unit(s) SubCutaneous three times a day before meals  levothyroxine 50 MICROGram(s) Oral daily  lidocaine   Patch 1 Patch Transdermal daily  melatonin 3 milliGRAM(s) Oral at bedtime  montelukast 10 milliGRAM(s) Oral daily  pantoprazole    Tablet 40 milliGRAM(s) Oral before breakfast  polyethylene glycol 3350 17 Gram(s) Oral daily  senna 2 Tablet(s) Oral at bedtime    MEDICATIONS  (PRN):  acetaminophen   Tablet .. 650 milliGRAM(s) Oral every 6 hours PRN Mild Pain (1 - 3), Moderate Pain (4 - 6)  dextrose 40% Gel 15 Gram(s) Oral once PRN Blood Glucose LESS THAN 70 milliGRAM(s)/deciliter  glucagon  Injectable 1 milliGRAM(s) IntraMuscular once PRN Glucose LESS THAN 70 milligrams/deciliter  heparin  Injectable 4000 Unit(s) IV Push every 6 hours PRN For aPTT less than 40  heparin  Injectable 2000 Unit(s) IV Push every 6 hours PRN For aPTT between 40 - 57    Vital Signs Last 24 Hrs  T(C): 36.9 (13 Jul 2019 13:14), Max: 36.9 (13 Jul 2019 13:14)  T(F): 98.4 (13 Jul 2019 13:14), Max: 98.4 (13 Jul 2019 13:14)  HR: 82 (13 Jul 2019 13:14) (69 - 120)  BP: 103/59 (13 Jul 2019 13:14) (90/62 - 112/62)  BP(mean): --  RR: 18 (13 Jul 2019 13:14) (18 - 18)  SpO2: 99% (13 Jul 2019 13:14) (99% - 100%)  Daily     Daily     PHYSICAL EXAM:  Constitutional:   appears comfortable and not distressed. Not diaphoretic.    Neck:  The thyroid is normal. Trachea is midline.     Respiratory: The lungs are clear to auscultation. No dullness and expansion is normal.    Cardiovascular: S1 and S2 are normal. No mummurs, rubs or gallops are present.    Gastrointestinal: The abdomen is soft. No tenderness is present. No masses are present. Bowel sounds are normal.    Genitourinary: The bladder is not distended. No CVA tenderness is present.    Extremities: No edema is noted. No deformities are present.    Neurological: Cognition is normal. Tone, power and sensation are normal.                            9.1    9.58  )-----------( 346      ( 13 Jul 2019 11:26 )             30.3     07-13    138  |  98  |  56<H>  ----------------------------<  197<H>  4.4   |  22  |  2.72<H>    Ca    8.6      13 Jul 2019 07:09  Phos  3.8     07-13  Mg     2.5     07-13    TPro  7.9  /  Alb  3.9  /  TBili  0.6  /  DBili  x   /  AST  122<H>  /  ALT  380<H>  /  AlkPhos  164<H>  07-13

## 2019-07-13 NOTE — PROGRESS NOTE ADULT - SUBJECTIVE AND OBJECTIVE BOX
CHIEF COMPLAINT: condition worse transfered to 605 had SVT   and CP last night  and high  TROP.     Patient is a 73y old  Female who presents with a chief complaint of Hypoxia, SOB    SUBJECTIVE:     REVIEW OF SYSTEMS:    CONSTITUTIONAL: ( x )  weakness,  (  ) fevers or chills  EYES/ENT: (  )visual changes;     NECK: (  ) pain or stiffness  RESPIRATORY:   (  )cough, wheezing, hemoptysis;  (x  ) shortness of breath  CARDIOVASCULAR:  (  )chest pain or palpitations  GASTROINTESTINAL:   (  )abdominal or epigastric pain.  ( x ) nausea, vomiting, or hematemesis;   (   ) diarrhea or constipation.   GENITOURINARY:   (    ) dysuria, frequency or hematuria  NEUROLOGICAL:  (   ) numbness or weakness   All other review of systems is negative unless indicated above    Vital Signs Last 24 Hrs  T(C): 36.7 (13 Jul 2019 06:30), Max: 36.9 (12 Jul 2019 14:00)  T(F): 98 (13 Jul 2019 06:30), Max: 98.4 (12 Jul 2019 14:00)  HR: 79 (13 Jul 2019 06:30) (69 - 120)  BP: 100/59 (13 Jul 2019 06:30) (89/38 - 127/79)  BP(mean): 71 (12 Jul 2019 18:00) (60 - 100)  RR: 18 (13 Jul 2019 06:30) (14 - 18)  SpO2: 100% (13 Jul 2019 06:30) (97% - 100%)    I&O's Summary    12 Jul 2019 07:01  -  13 Jul 2019 07:00  --------------------------------------------------------  IN: 242.5 mL / OUT: 430 mL / NET: -187.5 mL    13 Jul 2019 07:01  -  13 Jul 2019 09:44  --------------------------------------------------------  IN: 200 mL / OUT: 0 mL / NET: 200 mL        CAPILLARY BLOOD GLUCOSE      POCT Blood Glucose.: 236 mg/dL (13 Jul 2019 07:44)  POCT Blood Glucose.: 316 mg/dL (12 Jul 2019 23:28)  POCT Blood Glucose.: 250 mg/dL (12 Jul 2019 21:20)  POCT Blood Glucose.: 184 mg/dL (12 Jul 2019 17:16)  POCT Blood Glucose.: 208 mg/dL (12 Jul 2019 12:10)      PHYSICAL EXAM:    Constitutional:  in mild distress ? anxous     ( x  )awake and alert  HEENT: PERR, EOMI,    Neck: Soft and supple, No LAD, No JVD  Respiratory:  ( x   Breath sounds are clear bilaterally,    (   ) wheezing, rales or rhonchi  Cardiovascular:     ( x  )S1 and S2,    Gastrointestinal:  ( x  )Bowel Sounds present, soft,   (  )nontender, nondistended,    Extremities:    (  ) peripheral edema  Vascular: 2+ peripheral pulses  Neurological:    ( x   )A/O ,   (  ) focal deficits  Musculoskeletal:    (   )  normal strength b/l upper  (     ) normal  lower extremities  Skin: No rashes    MEDICATIONS:  MEDICATIONS  (STANDING):  aspirin enteric coated 81 milliGRAM(s) Oral daily  atorvastatin 40 milliGRAM(s) Oral at bedtime  chlorhexidine 2% Cloths 1 Application(s) Topical daily  dextrose 5%. 1000 milliLiter(s) (50 mL/Hr) IV Continuous <Continuous>  dextrose 50% Injectable 12.5 Gram(s) IV Push once  dextrose 50% Injectable 25 Gram(s) IV Push once  dextrose 50% Injectable 25 Gram(s) IV Push once  digoxin     Tablet 0.125 milliGRAM(s) Oral daily  docusate sodium 100 milliGRAM(s) Oral two times a day  furosemide   Injectable 40 milliGRAM(s) IV Push daily  heparin  Injectable 5000 Unit(s) SubCutaneous every 8 hours  hydrALAZINE 50 milliGRAM(s) Oral every 8 hours  insulin glargine Injectable (LANTUS) 28 Unit(s) SubCutaneous at bedtime  insulin lispro (HumaLOG) corrective regimen sliding scale   SubCutaneous three times a day before meals  insulin lispro (HumaLOG) corrective regimen sliding scale   SubCutaneous at bedtime  insulin lispro Injectable (HumaLOG) 11 Unit(s) SubCutaneous three times a day before meals  levothyroxine 50 MICROGram(s) Oral daily  lidocaine   Patch 1 Patch Transdermal daily  melatonin 3 milliGRAM(s) Oral at bedtime  montelukast 10 milliGRAM(s) Oral daily  pantoprazole    Tablet 40 milliGRAM(s) Oral before breakfast  polyethylene glycol 3350 17 Gram(s) Oral daily  senna 2 Tablet(s) Oral at bedtime  ticagrelor 90 milliGRAM(s) Oral every 12 hours      LABS: All Labs Reviewed:                        9.1    11.6  )-----------( 349      ( 12 Jul 2019 04:26 )             27.0     07-13    138  |  98  |  56<H>  ----------------------------<  197<H>  4.4   |  22  |  2.72<H>    Ca    8.6      13 Jul 2019 07:09  Phos  3.8     07-13  Mg     2.5     07-13    TPro  7.9  /  Alb  3.9  /  TBili  0.6  /  DBili  x   /  AST  122<H>  /  ALT  380<H>  /  AlkPhos  164<H>  07-13      CARDIAC MARKERS ( 13 Jul 2019 07:01 )  x     / x     / 61 U/L / x     / 3.9 ng/mL      Blood Culture: 07-09 @ 17:46  Organism --  Gram Stain Blood -- Gram Stain --  Specimen Source .Blood  Culture-Blood --      Urine Culture      RADIOLOGY/EKG:    ASSESSMENT AND PLAN:  74 yo woman with PMHx of HTN, T2DM, CAD s/p CABG (LIMA to LAD, SVG to OM, SVG to PDA 2014 at [facility]), non-dilated ICM (EF 20-25%), severe mitral regurgitation s/p mitral clip (6/13/19 Dr. Newman), severe pulm HTN, CKD, hypothyroidism, who is presenting to ED after experiencing worsening SOB and intermittent chest pain at [facility]. In ED, pt was placed on BiPAP and given lasix 40mg IVPx1. Transferred to CCU for further management. Undergoing diuresis, with improved hemodynamics. Found to have a leukocytosis, thought to be reactive.     Cardiac  Acute decompensated heart failure/volume   -TTE 7/5 showed EF 15-20% with global LV systolic dysfunction  -lasix gtt titrated to 5. to switch to lasix 60 IV BID if good UOP   -Strict I/O, trend weights  -Cardiac enzymes  elevated DW  team if condition not improve to transfer bac to CCU  she is FULL code       Cardioprotection  -ASA, brilinta, statin  -d/c'd lopressor 12.5  -off ACE 2/2 pressures  -DM management as below  -to trend bp's    Hemodynamics  -uptitrated hydralizine to 30 to lower SVR  -d'c;d vaso as elevated SVR point's to elevated afterload  -d/c'd beta blocker as above  -continue to monitor hemodynamics q4    EP  -now NSR, no SVTs  -dig 0.125 po  -EP consulted, no amiodarone 2/2 uptrending LFTs  -outpt followup for ICD implant for primary prevention  -On tele  -Continue to monitor  -Keep K>4 and Mg>2  -replete electrolytes as needed    Pulmonary  -acute respiratory distress 2/2 to ADHF,    -off bi-pap     -continue to trend SpO2    Endo  DM, hyperglycemia  -Hgb A1C 7.4  -started Lantus 25U at bedtime  -started ISS pre meal and at bedtime  -Started lispro 10U pre meal    Hypothyroidism  -On synthroid  -TSH 2.0  -T4, Serum: 6.8 ug/dL (06.12.19 @ 15:17)    Renal, CKD IV   -New baseline of 1.8-2.0 as per nephro  -Cr starting to downtrend  -improving given improved hemodynamics  -To trend renal function and avoid nephrotoxic meds    GI  -had abdominal pain, now resolved  -likely 2/2 to bowel edema/ischemia 2/2 to low CO/CI  -     ID  -afebrile  -WBCs downtrending      -to monitor off abx    Heme  -anemia, chronic  -b/l appx 8-9        FEN: none; replete electrolytes  DVT PPX:    ADVANCED DIRECTIVE:    DISPOSITION:

## 2019-07-13 NOTE — PROGRESS NOTE ADULT - ASSESSMENT
74 yo woman with PMHx of HTN, T2DM, CAD s/p CABG (LIMA to LAD, SVG to OM, SVG to PDA 2014 at [facility]), non-dilated ICM (EF 20-25%), severe mitral regurgitation s/p mitral clip (6/13/19 Dr. Newman), severe pulm HTN, CKD, hypothyroidism, who is presenting to ED after experiencing worsening SOB and intermittent chest pain at [facility]. In ED, pt was placed on BiPAP and given lasix 40mg IVPx1. Transferred to CCU for further management. Undergoing diuresis, with improved hemodynamics. Found to have a leukocytosis, thought to be reactive.    #HFrEF w/ EF of 20% w/ Severe MR s/p recent alonso clip.   - Pt still has elevated JVP, however, she has no peripheral edema at this time.   - She remains on O2.   - She appears to look more decompensated during or after SVT.   - Agree with diuresis with lasix 40 IV daily.   - Keep net negative.   - Check daily lactate   - BP too low to start afterload reducing agents at this time.   - Would also avoid beta blocker at this time.   - C/w digoxin for SVT right now. Monitor LFTs and Dig level   - Will consider amio if LFTs continue to improve.     #CAD w/ Hx of CABG. Now with elevated cardiac biomarkers.   - Pt with known ICM and closed grafts, but patent LIMA  - Pt has elevated in cardiac biomarkers, which is likely in the setting of demand 2/2 SVT  - Continue to trend q8 for now. CK remain negative at this time.   - C/w ASA, and Statin.  - If pt is to be started on AC would change Brilinta to Plavix  (24 hours after last Brilinta dose pt should be given Plavix 600 mg then start daily 75 mg)     #Atrial flutter. Currently in sinus.   - Pt seems to be doing best when she is in sinus.   - Pt had what looks like atrial flutter on tele, however, it was brief and strip is unclear.   - Pt also had runs of SVT in CCU which could have been atrial flutter.   - Ideally would start pt on AC. Would suggest heparin gtt for now given elevated Trop.   - If trop starts to down trend can change to apixaban.     Surendra Marshall MD  Cardiology Fellow - PGY 4  Text or Call: [phone]  For all New Consults and Questions:  [email or web address]   Login: [email or web address]

## 2019-07-13 NOTE — PROGRESS NOTE ADULT - SUBJECTIVE AND OBJECTIVE BOX
Patient seen and examined at bedside.    Overnight Events: Pt still on O2 overnight. Pt had SVT as well as atrial flutter overnight.       REVIEW OF SYSTEMS:  Constitutional:     [ ] negative [ ] fevers [ ] chills [ ] weight loss [ ] weight gain  HEENT:                  [ ] negative [ ] dry eyes [ ] eye irritation [ ] postnasal drip [ ] nasal congestion  CV:                         [ ] negative  [ ] chest pain [ ] orthopnea [ ] palpitations [ ] murmur  Resp:                     [ ] negative [ ] cough [ ] shortness of breath [ ] dyspnea [ ] wheezing [ ] sputum [ ]hemoptysis  GI:                          [ ] negative [ ] nausea [ ] vomiting [ ] diarrhea [ ] constipation [ ] abd pain [ ] dysphagia   :                        [ ] negative [ ] dysuria [ ] nocturia [ ] hematuria [ ] increased urinary frequency  Musculoskeletal: [ ] negative [ ] back pain [ ] myalgias [ ] arthralgias [ ] fracture  Skin:                       [ ] negative [ ] rash [ ] itch  Neurological:        [ ] negative [ ] headache [ ] dizziness [ ] syncope [ ] weakness [ ] numbness  Psychiatric:           [ ] negative [ ] anxiety [ ] depression  Endocrine:            [ ] negative [ ] diabetes [ ] thyroid problem  Heme/Lymph:      [ ] negative [ ] anemia [ ] bleeding problem  Allergic/Immune: [ ] negative [ ] itchy eyes [ ] nasal discharge [ ] hives [ ] angioedema    [x ] All other systems negative  [ ] Unable to assess ROS due to    Current Meds:  acetaminophen   Tablet .. 650 milliGRAM(s) Oral every 6 hours PRN  aspirin enteric coated 81 milliGRAM(s) Oral daily  atorvastatin 40 milliGRAM(s) Oral at bedtime  chlorhexidine 2% Cloths 1 Application(s) Topical daily  dextrose 40% Gel 15 Gram(s) Oral once PRN  dextrose 5%. 1000 milliLiter(s) IV Continuous <Continuous>  dextrose 50% Injectable 12.5 Gram(s) IV Push once  dextrose 50% Injectable 25 Gram(s) IV Push once  dextrose 50% Injectable 25 Gram(s) IV Push once  digoxin     Tablet 0.125 milliGRAM(s) Oral daily  docusate sodium 100 milliGRAM(s) Oral two times a day  furosemide   Injectable 40 milliGRAM(s) IV Push daily  glucagon  Injectable 1 milliGRAM(s) IntraMuscular once PRN  heparin  Injectable 5000 Unit(s) SubCutaneous every 8 hours  hydrALAZINE 50 milliGRAM(s) Oral every 8 hours  insulin glargine Injectable (LANTUS) 28 Unit(s) SubCutaneous at bedtime  insulin lispro (HumaLOG) corrective regimen sliding scale   SubCutaneous three times a day before meals  insulin lispro (HumaLOG) corrective regimen sliding scale   SubCutaneous at bedtime  insulin lispro Injectable (HumaLOG) 11 Unit(s) SubCutaneous three times a day before meals  levothyroxine 50 MICROGram(s) Oral daily  lidocaine   Patch 1 Patch Transdermal daily  melatonin 3 milliGRAM(s) Oral at bedtime  montelukast 10 milliGRAM(s) Oral daily  pantoprazole    Tablet 40 milliGRAM(s) Oral before breakfast  polyethylene glycol 3350 17 Gram(s) Oral daily  senna 2 Tablet(s) Oral at bedtime  ticagrelor 90 milliGRAM(s) Oral every 12 hours      PAST MEDICAL & SURGICAL HISTORY:  GERD (gastroesophageal reflux disease)  CAD (coronary artery disease): s/p CABG  Hypothyroidism  Hyperlipidemia  Diabetes mellitus, type 2  S/P CABG (coronary artery bypass graft)      Vitals:  T(F): 98 (07-13), Max: 98.8 (07-12)  HR: 79 (07-13) (69 - 120)  BP: 100/59 (07-13) (89/38 - 127/79)  RR: 18 (07-13)  SpO2: 100% (07-13)  I&O's Summary    12 Jul 2019 07:01  -  13 Jul 2019 07:00  --------------------------------------------------------  IN: 242.5 mL / OUT: 430 mL / NET: -187.5 mL        Physical Exam:  Appearance: Mild resp distress   Eyes: PERRL, EOMI, pink conjunctiva  HENT: Normal oral mucosa  Cardiovascular: RRR, S1, S2, no murmurs, rubs, or gallops; no edema; elevated JVP  Respiratory: Clear to auscultation bilaterally  Gastrointestinal: soft, non-tender, non-distended with normal bowel sounds  Musculoskeletal: No clubbing; no joint deformity   Neurologic: Non-focal  Lymphatic: No lymphadenopathy  Psychiatry: AAOx3, mood & affect appropriate  Skin: No rashes, ecchymoses, or cyanosis                          9.1    11.6  )-----------( 349      ( 12 Jul 2019 04:26 )             27.0     07-13    138  |  98  |  56<H>  ----------------------------<  197<H>  4.4   |  22  |  2.72<H>    Ca    8.6      13 Jul 2019 07:09  Phos  3.8     07-13  Mg     2.5     07-13    TPro  7.9  /  Alb  3.9  /  TBili  0.6  /  DBili  x   /  AST  122<H>  /  ALT  380<H>  /  AlkPhos  164<H>  07-13        New ECG(s): Personally reviewed    Echo:  < from: TTE with Doppler (w/Cont) (07.09.19 @ 07:49) >  1. Severe global left ventricular systolic dysfunction.  Endocardial visualization enhanced with intravenous  injection of Ultrasonic Enhancing Agent (Definity).  2. The right ventricle is not well visualized; grossly  normal right ventricular systolic function.  3. Estimated right ventricular systolic pressure equals 67  mm Hg, assuming right atrial pressure equals 15 mm Hg,  consistent with severe pulmonary hypertension.  4. Normal tricuspid valve. Mild-moderate tricuspid  regurgitation.    < end of copied text >    Stress Testing:   < from: Nuclear Stress Test-Pharmacologic (01.28.18 @ 12:31) >  IMPRESSIONS:Abnormal Study  * Myocardial Perfusion SPECT results are abnormal.  * There is a medium sized, mild to moderate defect in  anterior wall that is reversible, suggestive of  ischemia.There are large, moderate to severe defects in  inferolateral, lateral walls that are fixed, suggestive of  infarct.  * Post-stress gated wall motion analysis was performed  (LVEF = 33 %;LVEDV = 116 ml.), revealing severe overall  hypokinesis. There was focal inferiolateral akinesis and  severe lateral hypokinesis with absence of systolic  thickening. The best motion was in the septum.  *** Compared with Nuclear/Stress test of 11/5/2014, the  observed defect are now more extensive, suggesting  progression of disease. Prior LVEF was 39% with EDV 77 mL.    < end of copied text >    Cath:  < from: Cardiac Cath Lab - Adult (05.09.19 @ 17:37) >  LM:   --  Distal left main: There was a 0 % stenosis at the site of a prior  stent.  LAD:   --  Proximal LAD: There was a tubular 70 % stenosis. There was PARUL  grade 3 flow through the vessel (brisk flow).  --  Mid LAD: There was a 100 % stenosis. There was PARUL grade 0 flow  through the vessel (no flow).  CX:   --  Circumflex: Angiography showed minor luminal irregularities with  no flow limiting lesions.  RI:   --  Ostial ramus intermedius: The distal vessel was supplied by  collaterals from the LAD. There was a 100 % stenosis at the site of a  prior stent. There was PARUL grade 0 flow through the vessel (no flow).  RCA:   --  Mid RCA: The distal vessel was supplied by collaterals from  septal branches of LAD. There was a 100 % stenosis just after RV marginal  2. There was PARUL grade 0 flow through the vessel (no flow).  GRAFTS:   --  Graft to the mid LAD: The graft was a LIMA. It was normal.  COMPLICATIONS: There were no complications.  DIAGNOSTIC RECOMMENDATIONS: Patient management should include close  monitoring of BUN and creatinine. Medical management is recommended.  Prepared and signed by  Tor Prado M.D.    < end of copied text >    Imaging:    Interpretation of Telemetry: SVT, broke into sinus bert. Brief episode of atrial flutter.

## 2019-07-13 NOTE — PROVIDER CONTACT NOTE (CRITICAL VALUE NOTIFICATION) - ASSESSMENT
Pt A&Ox4. VSS. Pt was going to be started on a heparin drip. Pt is on aspirin. Pt is not having any signs and symptoms of bleeding.

## 2019-07-13 NOTE — CHART NOTE - NSCHARTNOTEFT_GEN_A_CORE
CC: -140s      HPI:  Called by RN to evaluate patient for HR in 130-140s at approximately 06:00 this AM. Patient seen and assessed at bedside, appears uncomfortable, alert and awake. Patient complained of chest "booming," shortness of breath, and nausea. She denied dizziness, vomiting, abdominal pain, arm pain, or jaw pain. Patient received digoxin 0.125mg at 05:45 this AM. Patient was resting comfortably in bed when she became tachycardic. Of note, patient was noted to be in SVT earlier in the admission while in CCU.             Vital Signs Last 24 Hrs  T(C): 36.8 (13 Jul 2019 04:38), Max: 37.1 (12 Jul 2019 09:00)  T(F): 98.2 (13 Jul 2019 04:38), Max: 98.8 (12 Jul 2019 09:00)  HR: 120 (13 Jul 2019 06:00) (69 - 120)  BP: 90/62 (13 Jul 2019 06:00) (89/38 - 127/79)  BP(mean): 71 (12 Jul 2019 18:00) (60 - 100)  RR: 18 (13 Jul 2019 06:00) (14 - 18)  SpO2: 100% (13 Jul 2019 06:00) (97% - 100%)      Physical Exam:  General: Elderly female laying in bed, appears uncomfortable, AOx3  Head:  NC/AT  CV: RRR, S1S2   Respiratory: CTA B/L, nonlabored  MSK: No BLLE edema, + peripheral pulses, FROM all 4 extremity  Skin: (+) warm, dry   Psych: Appears anxious       Labs:                        9.1    11.6  )-----------( 349      ( 12 Jul 2019 04:26 )             27.0     07-12    135  |  100  |  51<H>  ----------------------------<  226<H>  4.2   |  22  |  2.41<H>    Ca    8.5      12 Jul 2019 04:26  Phos  2.6     07-12  Mg     2.3     07-12    TPro  7.9  /  Alb  3.7  /  TBili  0.7  /  DBili  x   /  AST  272<H>  /  ALT  595<H>  /  AlkPhos  149<H>  07-12              Radiology:  < from: Xray Chest 1 View- PORTABLE-Routine (07.11.19 @ 08:47) >  IMPRESSION:   1. [place]-Angi catheter as above.  2. Clear lungs.          Assessment & Plan:  72 yo woman with PMHx of HTN, T2DM, CAD s/p CABG (LIMA to LAD, SVG to OM, SVG to PDA 2014 at [facility]), non-dilated ICM (EF 20-25%), severe mitral regurgitation s/p mitral clip (6/13/19 Dr. Newman), severe pulm HTN, CKD, hypothyroidism, who is presenting to ED after experiencing worsening SOB and intermittent chest pain at [facility]. In ED, pt was placed on BiPAP and given lasix 40mg IVPx1. Transferred to CCU for further management. Undergoing diuresis, with improved hemodynamics. Found to have a leukocytosis, thought to be reactive.   Patient now presenting acutely with tachycardia to 149 on telemetry while resting in bed. Patient complained of chest pain/ "booming" and shortness of breath. During the interview, patient spontaneously converted back to SR. During the interview, patient stated chest pain had resolved.       #Tachycardia- Telemetry strip appears to be SVT  -Patient is afebrile, TSH (7/5) WNL  -EKG performed however patient had converted back to SR spontaneously  -EKG: SR with 1st degree HB, HR 75BPM. No acute ST segment changes when compared with prior EKG in [place] from 7/10.  -STAT troponin, CMP, Mg, Phos ordered  -Telemetry currently sinus 80s, 1st degree HB. Continue to monitor on telemetry.   -Zofran 4mg IVP x1 for nausea  -Continue with digoxin as ordered  -EP is following patient, appreciate recommendations   -Will continue to closely monitor patient/vitals  -Primary Team to follow up in AM, attending to follow       Cristel Head PA-C  Dept of Medicine  [phone]

## 2019-07-13 NOTE — PROGRESS NOTE ADULT - ASSESSMENT
Pt is a 74 yo woman with PMHx of HTN, T2DM, CAD s/p CABG (LIMA to LAD, SVG to OM, SVG to PDA 2014 at [facility]), non-dilated ICM (EF 20-25%), severe mitral regurgitation s/p mitral clip (6/13/19 Dr. Newman), severe pulm HTN, CKD, hypothyroidism, who is presenting [facility] ED [facility]    A/P:    MERVIN, likely cardiorenal:  Improved but is above  baseline.    CKD stage 4:  Her new baseline Scr 1.8-2.0  Renal function fluctuates sec to CHF  Monitor renal function at present    CHF:  Diuresis per cardiology, on Lasix 40 daily.

## 2019-07-13 NOTE — CHART NOTE - NSCHARTNOTEFT_GEN_A_CORE
Event Summary:  Heparin gtt ordered this evening for elevated troponins. Coags checked prior administration of heparin gtt with aPTT of 135, and 95 on repeat. Patient CC: Elevated aPTT    Event Summary:  Heparin gtt ordered this evening for elevated troponins. Coags checked prior to administration of heparin gtt, notified by RN for aPTT of 135, and 95 on repeat. Patient is currently on ASA, brilinta (to be changed to plavix tmr), and subcutaneous heparin for ppx but no full AC. Blood was drawn from different arms, with no IV lines running. Order for heparin gtt was dc'ed as per Dr. Salazar. Follow-up coags in AM.      Triny Louise PA-C  Department of Medicine  #[id number] CC: Elevated aPTT    Event Summary:  Heparin gtt ordered this evening for elevated troponins. Coags checked prior to administration of heparin gtt, notified by RN for aPTT of 135, and 95 on repeat. Patient is currently on ASA, brilinta (to be changed to plavix tmr), and subcutaneous heparin for ppx but no full AC. Blood was drawn from different arms, with no IV lines running. No active signs of bleeding. Order for heparin gtt was dc'ed as per Dr. Salazar. Follow-up coags in AM.      Triny Louise PA-C  Department of Medicine  #[id number]

## 2019-07-13 NOTE — PROGRESS NOTE ADULT - ASSESSMENT
Pt is a 72 yo woman with PMHx of HTN, T2DM, CAD s/p CABG (LIMA to LAD, SVG to OM, SVG to PDA 2014 at [facility]), non-dilated ICM (EF 20-25%), severe mitral regurgitation s/p mitral clip (6/13/19 Dr. Newman), severe pulm HTN, CKD, hypothyroidism, who is [facility] ED [facility]. Also complaining of intermittent chest pain. Of note, pt was recently discharged on 6/19 after having mitral clip procedure completed. Pt says that she has been experiencing SOB for about 1 week. However, she was never noted to be hypoxic in rehab until today. She was not able to provide much hx 2/2 SOB and being on bipap. Daughter at bedside reporting that pt was well immediately after discharge. However, she gradually developed worsening SOB. Better with rest initially. Nothing alleviating SOB now. It is constant throughout the day, made worse with exertion. No fevers, chills, nausea, vomiting, cough, sputum production, chest tightness, pressure, palpitations, LOC, syncope.    In the ED, pt afebrile, , /75, RR 22, O2 100% on 2L NC. She subsequently developed worsened work of breathing and was placed on bipap with improvement. She was given , lasix 40mg IVP x1. CXR significant for pulmonary edema. Anemia to 9.3/28.5. BUN/Cr 42/1.72 (54/2.54 on 6/19). BNP 6626. VBG 7.49/29/61/22. TTE pending. (05 Jul 2019 19:02)    Pt transferred to CCU for further diuresis and management of acute decompensated heart failure and acute respiratory distress.  Pt with improved cardio-pulmonary status.  Pt also c/o abd pain, thought to be due to  bowel edema/ischemia 2/2 to low CO/CI.  Pt with transminitis likely from hypopersion state.  KUB (-) for acute abdominal pathology.      Pt has been afebrile.  WBC was trending upwards.  Pt started on vanco/meropenem.  Cultures sent.  Pt with prior h/o ESBL E.coli (6/13) from urine cultures.   Cxr clear lungs.  UA from 7/9 (-) LE/nit.    ID consult called for further abx managment.           Leukocytosis:    - r/o infectious source.  Suspect reactive leukocytosis.  Pt without localizing symptoms.  Afebrile.  Cxr clear.  UA (-).  Pt with cuellar in place, urine cultures unavailable and likely to be colonized.    Pt with prior UCx with ESBL from June, she does not appear to be acutely infected at this time.     - Bcx (-), and pt clinically improving (no fever, WBC trending downwards, hemodynamically stable, no new localizing symptoms), Agree with d/c all abx and continue to observe off.    No acute infectious issues at this time.  Cont to monitor for new fever, leukocytosis.        Will follow,    Joselin Roman  [phone]

## 2019-07-14 LAB
ANION GAP SERPL CALC-SCNC: 17 MMOL/L — SIGNIFICANT CHANGE UP (ref 5–17)
APTT BLD: 48.9 SEC — HIGH (ref 27.5–36.3)
BUN SERPL-MCNC: 63 MG/DL — HIGH (ref 7–23)
CALCIUM SERPL-MCNC: 8.8 MG/DL — SIGNIFICANT CHANGE UP (ref 8.4–10.5)
CHLORIDE SERPL-SCNC: 97 MMOL/L — SIGNIFICANT CHANGE UP (ref 96–108)
CO2 SERPL-SCNC: 20 MMOL/L — LOW (ref 22–31)
CREAT SERPL-MCNC: 2.59 MG/DL — HIGH (ref 0.5–1.3)
CULTURE RESULTS: SIGNIFICANT CHANGE UP
GLUCOSE BLDC GLUCOMTR-MCNC: 200 MG/DL — HIGH (ref 70–99)
GLUCOSE BLDC GLUCOMTR-MCNC: 206 MG/DL — HIGH (ref 70–99)
GLUCOSE BLDC GLUCOMTR-MCNC: 229 MG/DL — HIGH (ref 70–99)
GLUCOSE BLDC GLUCOMTR-MCNC: 244 MG/DL — HIGH (ref 70–99)
GLUCOSE BLDC GLUCOMTR-MCNC: 275 MG/DL — HIGH (ref 70–99)
GLUCOSE SERPL-MCNC: 180 MG/DL — HIGH (ref 70–99)
HCT VFR BLD CALC: 28 % — LOW (ref 34.5–45)
HGB BLD-MCNC: 8.3 G/DL — LOW (ref 11.5–15.5)
INR BLD: 1.01 RATIO — SIGNIFICANT CHANGE UP (ref 0.88–1.16)
LACTATE SERPL-SCNC: 1.3 MMOL/L — SIGNIFICANT CHANGE UP (ref 0.7–2)
MAGNESIUM SERPL-MCNC: 2.4 MG/DL — SIGNIFICANT CHANGE UP (ref 1.6–2.6)
MCHC RBC-ENTMCNC: 26.9 PG — LOW (ref 27–34)
MCHC RBC-ENTMCNC: 29.6 GM/DL — LOW (ref 32–36)
MCV RBC AUTO: 90.6 FL — SIGNIFICANT CHANGE UP (ref 80–100)
PLATELET # BLD AUTO: 280 K/UL — SIGNIFICANT CHANGE UP (ref 150–400)
POTASSIUM SERPL-MCNC: 4.3 MMOL/L — SIGNIFICANT CHANGE UP (ref 3.5–5.3)
POTASSIUM SERPL-SCNC: 4.3 MMOL/L — SIGNIFICANT CHANGE UP (ref 3.5–5.3)
PROTHROM AB SERPL-ACNC: 11.6 SEC — SIGNIFICANT CHANGE UP (ref 10–13.1)
RBC # BLD: 3.09 M/UL — LOW (ref 3.8–5.2)
RBC # FLD: 18.1 % — HIGH (ref 10.3–14.5)
SODIUM SERPL-SCNC: 134 MMOL/L — LOW (ref 135–145)
SPECIMEN SOURCE: SIGNIFICANT CHANGE UP
WBC # BLD: 8.46 K/UL — SIGNIFICANT CHANGE UP (ref 3.8–10.5)
WBC # FLD AUTO: 8.46 K/UL — SIGNIFICANT CHANGE UP (ref 3.8–10.5)

## 2019-07-14 RX ADMIN — MONTELUKAST 10 MILLIGRAM(S): 4 TABLET, CHEWABLE ORAL at 12:36

## 2019-07-14 RX ADMIN — SENNA PLUS 2 TABLET(S): 8.6 TABLET ORAL at 21:42

## 2019-07-14 RX ADMIN — Medication 40 MILLIGRAM(S): at 06:54

## 2019-07-14 RX ADMIN — Medication 0.12 MILLIGRAM(S): at 06:54

## 2019-07-14 RX ADMIN — LIDOCAINE 1 PATCH: 4 CREAM TOPICAL at 21:45

## 2019-07-14 RX ADMIN — Medication 2: at 17:25

## 2019-07-14 RX ADMIN — Medication 50 MILLIGRAM(S): at 21:42

## 2019-07-14 RX ADMIN — Medication 100 MILLIGRAM(S): at 17:24

## 2019-07-14 RX ADMIN — LIDOCAINE 1 PATCH: 4 CREAM TOPICAL at 09:28

## 2019-07-14 RX ADMIN — CLOPIDOGREL BISULFATE 600 MILLIGRAM(S): 75 TABLET, FILM COATED ORAL at 17:25

## 2019-07-14 RX ADMIN — INSULIN GLARGINE 28 UNIT(S): 100 INJECTION, SOLUTION SUBCUTANEOUS at 21:43

## 2019-07-14 RX ADMIN — Medication 50 MILLIGRAM(S): at 06:54

## 2019-07-14 RX ADMIN — Medication 11 UNIT(S): at 08:57

## 2019-07-14 RX ADMIN — Medication 3: at 08:57

## 2019-07-14 RX ADMIN — POLYETHYLENE GLYCOL 3350 17 GRAM(S): 17 POWDER, FOR SOLUTION ORAL at 12:36

## 2019-07-14 RX ADMIN — Medication 50 MICROGRAM(S): at 06:54

## 2019-07-14 RX ADMIN — Medication 2: at 12:36

## 2019-07-14 RX ADMIN — Medication 50 MILLIGRAM(S): at 15:15

## 2019-07-14 RX ADMIN — ATORVASTATIN CALCIUM 40 MILLIGRAM(S): 80 TABLET, FILM COATED ORAL at 21:42

## 2019-07-14 RX ADMIN — Medication 650 MILLIGRAM(S): at 10:00

## 2019-07-14 RX ADMIN — Medication 3 MILLIGRAM(S): at 21:42

## 2019-07-14 RX ADMIN — Medication 11 UNIT(S): at 12:36

## 2019-07-14 RX ADMIN — Medication 81 MILLIGRAM(S): at 12:36

## 2019-07-14 RX ADMIN — Medication 11 UNIT(S): at 17:25

## 2019-07-14 RX ADMIN — Medication 100 MILLIGRAM(S): at 06:55

## 2019-07-14 RX ADMIN — Medication 650 MILLIGRAM(S): at 09:14

## 2019-07-14 RX ADMIN — PANTOPRAZOLE SODIUM 40 MILLIGRAM(S): 20 TABLET, DELAYED RELEASE ORAL at 06:54

## 2019-07-14 RX ADMIN — LIDOCAINE 1 PATCH: 4 CREAM TOPICAL at 07:00

## 2019-07-14 NOTE — PROGRESS NOTE ADULT - SUBJECTIVE AND OBJECTIVE BOX
SELVIN CLAIRE  73y  Patient is a 73y old  Female who presents with a chief complaint of Hypoxia, SOB (14 Jul 2019 08:41)    HPI:  This is a patient with stage 4 CKD who was admitted for Decompensated CHF. On Lasix 40 daily, weak but stable.    HEALTH ISSUES - PROBLEM Dx:  Fluid overload: Fluid overload        MEDICATIONS  (STANDING):  aspirin enteric coated 81 milliGRAM(s) Oral daily  atorvastatin 40 milliGRAM(s) Oral at bedtime  chlorhexidine 2% Cloths 1 Application(s) Topical daily  clopidogrel Tablet 600 milliGRAM(s) Oral once  dextrose 5%. 1000 milliLiter(s) (50 mL/Hr) IV Continuous <Continuous>  dextrose 50% Injectable 12.5 Gram(s) IV Push once  dextrose 50% Injectable 25 Gram(s) IV Push once  dextrose 50% Injectable 25 Gram(s) IV Push once  digoxin     Tablet 0.125 milliGRAM(s) Oral daily  docusate sodium 100 milliGRAM(s) Oral two times a day  furosemide   Injectable 40 milliGRAM(s) IV Push daily  hydrALAZINE 50 milliGRAM(s) Oral every 8 hours  insulin glargine Injectable (LANTUS) 28 Unit(s) SubCutaneous at bedtime  insulin lispro (HumaLOG) corrective regimen sliding scale   SubCutaneous three times a day before meals  insulin lispro (HumaLOG) corrective regimen sliding scale   SubCutaneous at bedtime  insulin lispro Injectable (HumaLOG) 11 Unit(s) SubCutaneous three times a day before meals  levothyroxine 50 MICROGram(s) Oral daily  lidocaine   Patch 1 Patch Transdermal daily  melatonin 3 milliGRAM(s) Oral at bedtime  montelukast 10 milliGRAM(s) Oral daily  pantoprazole    Tablet 40 milliGRAM(s) Oral before breakfast  polyethylene glycol 3350 17 Gram(s) Oral daily  senna 2 Tablet(s) Oral at bedtime    MEDICATIONS  (PRN):  acetaminophen   Tablet .. 650 milliGRAM(s) Oral every 6 hours PRN Mild Pain (1 - 3), Moderate Pain (4 - 6)  dextrose 40% Gel 15 Gram(s) Oral once PRN Blood Glucose LESS THAN 70 milliGRAM(s)/deciliter  glucagon  Injectable 1 milliGRAM(s) IntraMuscular once PRN Glucose LESS THAN 70 milligrams/deciliter  heparin  Injectable 4000 Unit(s) IV Push every 6 hours PRN For aPTT less than 40  heparin  Injectable 2000 Unit(s) IV Push every 6 hours PRN For aPTT between 40 - 57    Vital Signs Last 24 Hrs  T(C): 36.4 (14 Jul 2019 11:25), Max: 36.9 (13 Jul 2019 13:14)  T(F): 97.6 (14 Jul 2019 11:25), Max: 98.5 (14 Jul 2019 04:12)  HR: 70 (14 Jul 2019 11:25) (68 - 82)  BP: 107/56 (14 Jul 2019 11:25) (103/59 - 107/63)  BP(mean): --  RR: 18 (14 Jul 2019 11:25) (18 - 20)  SpO2: 100% (14 Jul 2019 11:25) (99% - 100%)  Daily     Daily     PHYSICAL EXAM:  Constitutional:  She appears comfortable and not distressed. Not diaphoretic.    Neck:  The thyroid is normal. Trachea is midline.     Respiratory: The lungs are clear to auscultation. No dullness and expansion is normal.    Cardiovascular: S1 and S2 are normal. No mummurs, rubs or gallops are present.    Gastrointestinal: The abdomen is soft. No tenderness is present. No masses are present. Bowel sounds are normal.    Genitourinary: The bladder is not distended. No CVA tenderness is present.    Extremities: No edema is noted. No deformities are present.    Neurological: Cognition is normal. Tone, power and sensation are normal.                            8.3    8.46  )-----------( 280      ( 14 Jul 2019 09:58 )             28.0     07-14    134<L>  |  97  |  63<H>  ----------------------------<  180<H>  4.3   |  20<L>  |  2.59<H>    Ca    8.8      14 Jul 2019 06:45  Phos  3.8     07-13  Mg     2.4     07-14    TPro  7.9  /  Alb  3.9  /  TBili  0.6  /  DBili  x   /  AST  122<H>  /  ALT  380<H>  /  AlkPhos  164<H>  07-13

## 2019-07-14 NOTE — PROGRESS NOTE ADULT - ASSESSMENT
72 yo female w h/o HTN, T2DM, CAD s/p CABG (LIMA to LAD, SVG to OM, SVG to PDA 2014 at [facility]), non-dilated ICM (EF 20-25%), severe mitral regurgitation s/p mitral clip (6/13/19), severe pulm HTN, hypothyroidism p/w SOB 2/2 acute decompensated heart failure.    #HFrEF w/ EF of 20% w/ Severe MR s/p recent alonso clip.   - No arrhythmias on tele overnight  - Crackles at bases, c/w Lasix 40 IV daily   - Keep net negative. Strict I/O, daily weights  - Check daily lactate   - c/w Hydralazine 50 Q8 for afterload reduction  - Would avoid beta blocker for now.  - C/w digoxin for SVT  - Will consider amio if LFTs continue to improve.     #CAD w/ Hx of CABG. with elevated cardiac biomarkers.   - Pt with known ICM and closed grafts, but patent LIMA  - Cardiac enzymes downtrending, was likely in the setting of demand 2/2 SVT  - C/w ASA, Plavix, and Statin.    #Atrial flutter. Currently in sinus.   - No A-flutter or SVT overnight  - Given that A-flutter episode was not recorded and in CCU all her arrhythmias were SVT, likely AVNRT, would defer anticoagulation for now.    Reji Leon MD  Cardiology Fellow  [phone]  All Cardiology service information can be found 24/7 on [email or web address], password: [email or web address]

## 2019-07-14 NOTE — PROGRESS NOTE ADULT - SUBJECTIVE AND OBJECTIVE BOX
CHIEF COMPLAINT: condition  better family @ bed side was transfered to 605 had SVT   and CP last night  and high  TROP.     Patient is a 73y old  Female who presents with a chief complaint of Hypoxia, SOB    SUBJECTIVE:     REVIEW OF SYSTEMS:    CONSTITUTIONAL: (x  )  weakness,  (  ) fevers or chills  EYES/ENT: (  )visual changes;     NECK: (  ) pain or stiffness  RESPIRATORY:   (  )cough, wheezing, hemoptysis;  (  ) shortness of breath  CARDIOVASCULAR:  (  )chest pain or palpitations  GASTROINTESTINAL:   (  )abdominal or epigastric pain.  (  ) nausea, vomiting, or hematemesis;   (   ) diarrhea or constipation.   GENITOURINARY:   (    ) dysuria, frequency or hematuria  NEUROLOGICAL:  (   ) numbness or weakness   All other review of systems is negative unless indicated above    Vital Signs Last 24 Hrs  T(C): 36.8 (14 Jul 2019 19:47), Max: 36.9 (14 Jul 2019 04:12)  T(F): 98.3 (14 Jul 2019 19:47), Max: 98.5 (14 Jul 2019 04:12)  HR: 72 (14 Jul 2019 19:47) (68 - 75)  BP: 109/49 (14 Jul 2019 19:47) (107/56 - 113/63)  BP(mean): --  RR: 17 (14 Jul 2019 19:47) (17 - 18)  SpO2: 100% (14 Jul 2019 19:47) (100% - 100%)    I&O's Summary    13 Jul 2019 07:01  -  14 Jul 2019 07:00  --------------------------------------------------------  IN: 1050 mL / OUT: 750 mL / NET: 300 mL    14 Jul 2019 07:01  -  14 Jul 2019 22:50  --------------------------------------------------------  IN: 700 mL / OUT: 100 mL / NET: 600 mL        CAPILLARY BLOOD GLUCOSE      POCT Blood Glucose.: 206 mg/dL (14 Jul 2019 21:16)  POCT Blood Glucose.: 229 mg/dL (14 Jul 2019 16:38)  POCT Blood Glucose.: 244 mg/dL (14 Jul 2019 12:01)  POCT Blood Glucose.: 275 mg/dL (14 Jul 2019 08:56)  POCT Blood Glucose.: 200 mg/dL (14 Jul 2019 07:34)      PHYSICAL EXAM:       Constitutional:  in mild distress ? anxous     ( x  )awake and alert  HEENT: PERR, EOMI,    Neck: Soft and supple, No LAD, No JVD  Respiratory:  ( x   Breath sounds are clear bilaterally,    ( x  )  , rales    Cardiovascular:     ( x  )S1 and S2,    Gastrointestinal:  ( x  )Bowel Sounds present, soft,   (  )nontender, nondistended,    Extremities:    (  ) peripheral edema  Vascular: 2+ peripheral pulses  Neurological:    ( x   )A/O ,   (  ) focal deficits  Musculoskeletal:    (   )  normal strength b/l upper  (     ) normal  lower extremities  Skin: No rashes    MEDICATIONS:  MEDICATIONS  (STANDING):  aspirin enteric coated 81 milliGRAM(s) Oral daily  atorvastatin 40 milliGRAM(s) Oral at bedtime  chlorhexidine 2% Cloths 1 Application(s) Topical daily  dextrose 5%. 1000 milliLiter(s) (50 mL/Hr) IV Continuous <Continuous>  dextrose 50% Injectable 12.5 Gram(s) IV Push once  dextrose 50% Injectable 25 Gram(s) IV Push once  dextrose 50% Injectable 25 Gram(s) IV Push once  digoxin     Tablet 0.125 milliGRAM(s) Oral daily  docusate sodium 100 milliGRAM(s) Oral two times a day  furosemide   Injectable 40 milliGRAM(s) IV Push daily  hydrALAZINE 50 milliGRAM(s) Oral every 8 hours  insulin glargine Injectable (LANTUS) 28 Unit(s) SubCutaneous at bedtime  insulin lispro (HumaLOG) corrective regimen sliding scale   SubCutaneous three times a day before meals  insulin lispro (HumaLOG) corrective regimen sliding scale   SubCutaneous at bedtime  insulin lispro Injectable (HumaLOG) 11 Unit(s) SubCutaneous three times a day before meals  levothyroxine 50 MICROGram(s) Oral daily  lidocaine   Patch 1 Patch Transdermal daily  melatonin 3 milliGRAM(s) Oral at bedtime  montelukast 10 milliGRAM(s) Oral daily  pantoprazole    Tablet 40 milliGRAM(s) Oral before breakfast  polyethylene glycol 3350 17 Gram(s) Oral daily  senna 2 Tablet(s) Oral at bedtime      LABS: All Labs Reviewed:                        8.3    8.46  )-----------( 280      ( 14 Jul 2019 09:58 )             28.0     07-14                        9.1    11.6  )-----------( 349      ( 12 Jul 2019 04:26 )             27.0     07-13    134<L>  |  97  |  63<H>  ----------------------------<  180<H>  4.3   |  20<L>  |  2.59<H>    Ca    8.8      14 Jul 2019 06:45  Phos  3.8     07-13  Mg     2.4     07-14    TPro  7.9  /  Alb  3.9  /  TBili  0.6  /  DBili  x   /  AST  122<H>  /  ALT  380<H>  /  AlkPhos  164<H>  07-13    PT/INR - ( 14 Jul 2019 09:32 )   PT: 11.6 sec;   INR: 1.01 ratio         PTT - ( 14 Jul 2019 09:32 )  PTT:48.9 sec  CARDIAC MARKERS ( 13 Jul 2019 09:18 )  x     / x     / 53 U/L / x     / 3.8 ng/mL  CARDIAC MARKERS ( 13 Jul 2019 07:01 )  x     / x     / 61 U/L / x     / 3.9 ng/mL      Blood Culture:   Urine Culture      RADIOLOGY/EKG:    ASSESSMENT AND PLAN:  74 yo female w h/o HTN, T2DM, CAD s/p CABG (LIMA to LAD, SVG to OM, SVG to PDA 2014 at [facility]), non-dilated ICM (EF 20-25%), severe mitral regurgitation s/p mitral clip (6/13/19), severe pulm HTN, hypothyroidism p/w SOB 2/2 acute decompensated heart failure.    #HFrEF w/ EF of 20% w/ Severe MR s/p recent alonso clip.   - No arrhythmias on tele overnight  - Crackles at bases, c/w Lasix 40 IV daily   - Keep net negative. Strict I/O, daily weights  - Check daily lactate   - c/w Hydralazine 50 Q8 for afterload reduction  - Would avoid beta blocker for now.  - C/w digoxin for SVT  - Will consider amio if LFTs continue to improve.     #CAD w/ Hx of CABG. with elevated cardiac biomarkers.   - Pt with known ICM and closed grafts, but patent LIMA  - Cardiac enzymes downtrending, was likely in the setting of demand 2/2 SVT  - C/w ASA, Plavix, and Statin.    #Atrial flutter. Currently in sinus.   - No A-flutter or SVT overnight  - Given that A-flutter episode was not recorded and in CCU all her arrhythmias were SVT, likely AVNRT, would defer anticoagulation for now.    DVT PPX:    ADVANCED DIRECTIVE:    DISPOSITION: Dw her family  DC planning HOME ? when she is stable

## 2019-07-14 NOTE — PROGRESS NOTE ADULT - ASSESSMENT
Pt is a 74 yo woman with PMHx of HTN, T2DM, CAD s/p CABG (LIMA to LAD, SVG to OM, SVG to PDA 2014 at [facility]), non-dilated ICM (EF 20-25%), severe mitral regurgitation s/p mitral clip (6/13/19 Dr. Newman), severe pulm HTN, CKD, hypothyroidism, who is presenting [facility] ED [facility]    A/P:    MERVIN, likely cardiorenal:  Improved but is above  baseline.    CKD stage 4:  Her new baseline Scr 1.8-2.0  Renal function fluctuates sec to CHF  Monitor renal function at present    CHF:  Diuresis per cardiology, on Lasix 40 daily.    Congestive Hepatomegally:

## 2019-07-14 NOTE — PROGRESS NOTE ADULT - SUBJECTIVE AND OBJECTIVE BOX
Patient seen and examined at bedside.    Overnight Events: No acute events. Pt reports feeling very weak.    REVIEW OF SYSTEMS:  General: Weakness  Skin: no rashes.  Ophthalmologic: no blurred vision, no loss of vision. 	  ENT: no sore throat, rhinorrhea, sinus congestion.  Respiratory: no SOB, cough or wheeze.  CV: No chest pain. No palpitations.   Gastrointestinal:  no N/V/D, no melena/hematemesis/hematochezia.  Genitourinary: no dysuria/hesitancy or hematuria.  Musculoskeletal: no myalgias or arthralgias.  Neurological: no changes in vision or hearing, no lightheadedness/dizziness, no syncope/near syncope	  Psychiatric: no unusual stress/anxiety.   Hematology/Lymphatics: no unusual bleeding, bruising and no lymphadenopathy.  Endocrine: no unusual thirst.        Current Meds:  acetaminophen   Tablet .. 650 milliGRAM(s) Oral every 6 hours PRN  aspirin enteric coated 81 milliGRAM(s) Oral daily  atorvastatin 40 milliGRAM(s) Oral at bedtime  chlorhexidine 2% Cloths 1 Application(s) Topical daily  clopidogrel Tablet 600 milliGRAM(s) Oral once  dextrose 40% Gel 15 Gram(s) Oral once PRN  dextrose 5%. 1000 milliLiter(s) IV Continuous <Continuous>  dextrose 50% Injectable 12.5 Gram(s) IV Push once  dextrose 50% Injectable 25 Gram(s) IV Push once  dextrose 50% Injectable 25 Gram(s) IV Push once  digoxin     Tablet 0.125 milliGRAM(s) Oral daily  docusate sodium 100 milliGRAM(s) Oral two times a day  furosemide   Injectable 40 milliGRAM(s) IV Push daily  glucagon  Injectable 1 milliGRAM(s) IntraMuscular once PRN  heparin  Injectable 4000 Unit(s) IV Push every 6 hours PRN  heparin  Injectable 2000 Unit(s) IV Push every 6 hours PRN  hydrALAZINE 50 milliGRAM(s) Oral every 8 hours  insulin glargine Injectable (LANTUS) 28 Unit(s) SubCutaneous at bedtime  insulin lispro (HumaLOG) corrective regimen sliding scale   SubCutaneous three times a day before meals  insulin lispro (HumaLOG) corrective regimen sliding scale   SubCutaneous at bedtime  insulin lispro Injectable (HumaLOG) 11 Unit(s) SubCutaneous three times a day before meals  levothyroxine 50 MICROGram(s) Oral daily  lidocaine   Patch 1 Patch Transdermal daily  melatonin 3 milliGRAM(s) Oral at bedtime  montelukast 10 milliGRAM(s) Oral daily  pantoprazole    Tablet 40 milliGRAM(s) Oral before breakfast  polyethylene glycol 3350 17 Gram(s) Oral daily  senna 2 Tablet(s) Oral at bedtime      PAST MEDICAL & SURGICAL HISTORY:  GERD (gastroesophageal reflux disease)  CAD (coronary artery disease): s/p CABG  Hypothyroidism  Hyperlipidemia  Diabetes mellitus, type 2  S/P CABG (coronary artery bypass graft)      Vitals:  T(F): 98.5 (07-14), Max: 98.5 (07-14)  HR: 68 (07-14) (68 - 82)  BP: 107/63 (07-14) (103/59 - 107/63)  RR: 18 (07-14)  SpO2: 100% (07-14)  I&O's Summary    13 Jul 2019 07:01  -  14 Jul 2019 07:00  --------------------------------------------------------  IN: 1050 mL / OUT: 750 mL / NET: 300 mL        Physical Exam:  Appearance: No acute distress  Eyes: PERRL, EOMI, pink conjunctiva  HENT: Normal oral mucosa  Cardiovascular: RRR, S1, S2, no murmurs, rubs, or gallops; no edema  Respiratory: Crackles at bases b/l  Gastrointestinal: soft, non-tender, non-distended with normal bowel sounds  Musculoskeletal: No clubbing; no joint deformity   Neurologic: Non-focal  Lymphatic: No lymphadenopathy  Psychiatry: AAOx3, mood & affect appropriate  Skin: No rashes, ecchymoses, or cyanosis                          9.1    9.58  )-----------( 346      ( 13 Jul 2019 11:26 )             30.3     07-14    134<L>  |  97  |  63<H>  ----------------------------<  180<H>  4.3   |  20<L>  |  2.59<H>    Ca    8.8      14 Jul 2019 06:45  Phos  3.8     07-13  Mg     2.4     07-14    TPro  7.9  /  Alb  3.9  /  TBili  0.6  /  DBili  x   /  AST  122<H>  /  ALT  380<H>  /  AlkPhos  164<H>  07-13    PT/INR - ( 13 Jul 2019 22:34 )   PT: 11.9 sec;   INR: 1.04 ratio         PTT - ( 13 Jul 2019 22:34 )  PTT:95.5 sec  CARDIAC MARKERS ( 13 Jul 2019 09:18 )  x     / x     / 53 U/L / x     / 3.8 ng/mL  CARDIAC MARKERS ( 13 Jul 2019 07:01 )  x     / x     / 61 U/L / x     / 3.9 ng/mL              New ECG(s): Personally reviewed    Echo: < from: TTE with Doppler (w/Cont) (07.09.19 @ 07:49) >  Dimensions:    Normal Values:  LA:     3.4    2.0 - 4.0 cm  Ao:            2.0 - 3.8 cm  SEPTUM: 1.1    0.6 - 1.2 cm  PWT:    1.1    0.6 - 1.1 cm  LVIDd:  4.3    3.0 - 5.6 cm  LVIDs:  3.9    1.8 - 4.0 cm  Derived variables:  LVMI: 109 g/m2  RWT: 0.51  Fractional short: 9 %  EF (Teicholtz): 21 %  Doppler Peak Velocity (m/sec): AoV=1.0  ------------------------------------------------------------------------  Observations:  Mitral Valve: Mitral Valve Clip. Mild mitral regurgitation.    Aortic Valve/Aorta: Normal trileaflet aortic valve. Peak  transaortic valve gradient equals 4 mm Hg. Minimal aortic  regurgitation.  Peak left ventricular outflow tract  gradient equals 1 mmHg.  Normal aortic root.  Left Atrium: Normal left atrium.  Left Ventricle: Severe global left ventricular systolic  dysfunction. Endocardial visualization enhanced with  intravenous injection of Ultrasonic Enhancing Agent  (Definity). Normal left ventricular internal dimensions and  wall thicknesses.  Right Heart: Normal right atrium. The right ventricle is  not well visualized; grossly normal right ventricular  systolic function. Normal tricuspid valve. Mild-moderate  tricuspid regurgitation. Normal pulmonic valve. Mild  pulmonic regurgitation.  Pericardium/Pleura: Normal pericardium with no pericardial  effusion.  Hemodynamic: Estimated right ventricular systolic pressure  equals 67 mm Hg, assuming right atrial pressure equals 15  mm Hg, consistent with severe pulmonary hypertension.  ------------------------------------------------------------------------  Conclusions:  1. Severe global left ventricular systolic dysfunction.  Endocardial visualization enhanced with intravenous  injection of Ultrasonic Enhancing Agent (Definity).  2. The right ventricle is not well visualized; grossly  normal right ventricular systolic function.  3. Estimated right ventricular systolic pressure equals 67  mm Hg, assuming right atrial pressure equals 15 mm Hg,  consistent with severe pulmonary hypertension.  4. Normal tricuspid valve. Mild-moderate tricuspid  regurgitation.    < end of copied text >      Stress Testing:     Cath:    Imaging:    Interpretation of Telemetry: Sinus 1st degree AV block 60-80

## 2019-07-15 DIAGNOSIS — I34.0 NONRHEUMATIC MITRAL (VALVE) INSUFFICIENCY: ICD-10-CM

## 2019-07-15 DIAGNOSIS — I25.10 ATHEROSCLEROTIC HEART DISEASE OF NATIVE CORONARY ARTERY WITHOUT ANGINA PECTORIS: ICD-10-CM

## 2019-07-15 DIAGNOSIS — I50.23 ACUTE ON CHRONIC SYSTOLIC (CONGESTIVE) HEART FAILURE: ICD-10-CM

## 2019-07-15 DIAGNOSIS — N17.9 ACUTE KIDNEY FAILURE, UNSPECIFIED: ICD-10-CM

## 2019-07-15 DIAGNOSIS — I47.1 SUPRAVENTRICULAR TACHYCARDIA: ICD-10-CM

## 2019-07-15 LAB
ANION GAP SERPL CALC-SCNC: 15 MMOL/L — SIGNIFICANT CHANGE UP (ref 5–17)
BUN SERPL-MCNC: 68 MG/DL — HIGH (ref 7–23)
CALCIUM SERPL-MCNC: 9.4 MG/DL — SIGNIFICANT CHANGE UP (ref 8.4–10.5)
CHLORIDE SERPL-SCNC: 96 MMOL/L — SIGNIFICANT CHANGE UP (ref 96–108)
CO2 SERPL-SCNC: 20 MMOL/L — LOW (ref 22–31)
CREAT SERPL-MCNC: 2.45 MG/DL — HIGH (ref 0.5–1.3)
GLUCOSE BLDC GLUCOMTR-MCNC: 240 MG/DL — HIGH (ref 70–99)
GLUCOSE BLDC GLUCOMTR-MCNC: 272 MG/DL — HIGH (ref 70–99)
GLUCOSE BLDC GLUCOMTR-MCNC: 301 MG/DL — HIGH (ref 70–99)
GLUCOSE BLDC GLUCOMTR-MCNC: 344 MG/DL — HIGH (ref 70–99)
GLUCOSE BLDC GLUCOMTR-MCNC: 454 MG/DL — CRITICAL HIGH (ref 70–99)
GLUCOSE BLDC GLUCOMTR-MCNC: 454 MG/DL — CRITICAL HIGH (ref 70–99)
GLUCOSE SERPL-MCNC: 233 MG/DL — HIGH (ref 70–99)
HCT VFR BLD CALC: 26.1 % — LOW (ref 34.5–45)
HGB BLD-MCNC: 7.7 G/DL — LOW (ref 11.5–15.5)
LACTATE SERPL-SCNC: 0.8 MMOL/L — SIGNIFICANT CHANGE UP (ref 0.7–2)
MCHC RBC-ENTMCNC: 26.3 PG — LOW (ref 27–34)
MCHC RBC-ENTMCNC: 29.5 GM/DL — LOW (ref 32–36)
MCV RBC AUTO: 89.1 FL — SIGNIFICANT CHANGE UP (ref 80–100)
PLATELET # BLD AUTO: 284 K/UL — SIGNIFICANT CHANGE UP (ref 150–400)
POTASSIUM SERPL-MCNC: 4.6 MMOL/L — SIGNIFICANT CHANGE UP (ref 3.5–5.3)
POTASSIUM SERPL-SCNC: 4.6 MMOL/L — SIGNIFICANT CHANGE UP (ref 3.5–5.3)
RBC # BLD: 2.93 M/UL — LOW (ref 3.8–5.2)
RBC # FLD: 17.6 % — HIGH (ref 10.3–14.5)
SODIUM SERPL-SCNC: 131 MMOL/L — LOW (ref 135–145)
WBC # BLD: 7.82 K/UL — SIGNIFICANT CHANGE UP (ref 3.8–10.5)
WBC # FLD AUTO: 7.82 K/UL — SIGNIFICANT CHANGE UP (ref 3.8–10.5)

## 2019-07-15 PROCEDURE — 99223 1ST HOSP IP/OBS HIGH 75: CPT

## 2019-07-15 RX ADMIN — Medication 40 MILLIGRAM(S): at 05:34

## 2019-07-15 RX ADMIN — Medication 81 MILLIGRAM(S): at 11:53

## 2019-07-15 RX ADMIN — Medication 11 UNIT(S): at 08:52

## 2019-07-15 RX ADMIN — LIDOCAINE 1 PATCH: 4 CREAM TOPICAL at 21:45

## 2019-07-15 RX ADMIN — CLOPIDOGREL BISULFATE 75 MILLIGRAM(S): 75 TABLET, FILM COATED ORAL at 11:53

## 2019-07-15 RX ADMIN — Medication 50 MILLIGRAM(S): at 15:40

## 2019-07-15 RX ADMIN — Medication 1: at 21:45

## 2019-07-15 RX ADMIN — ATORVASTATIN CALCIUM 40 MILLIGRAM(S): 80 TABLET, FILM COATED ORAL at 21:44

## 2019-07-15 RX ADMIN — MONTELUKAST 10 MILLIGRAM(S): 4 TABLET, CHEWABLE ORAL at 11:52

## 2019-07-15 RX ADMIN — Medication 100 MILLIGRAM(S): at 17:12

## 2019-07-15 RX ADMIN — Medication 100 MILLIGRAM(S): at 05:34

## 2019-07-15 RX ADMIN — Medication 4: at 08:52

## 2019-07-15 RX ADMIN — Medication 11 UNIT(S): at 17:13

## 2019-07-15 RX ADMIN — LIDOCAINE 1 PATCH: 4 CREAM TOPICAL at 07:00

## 2019-07-15 RX ADMIN — Medication 0.12 MILLIGRAM(S): at 05:34

## 2019-07-15 RX ADMIN — SENNA PLUS 2 TABLET(S): 8.6 TABLET ORAL at 21:44

## 2019-07-15 RX ADMIN — INSULIN GLARGINE 28 UNIT(S): 100 INJECTION, SOLUTION SUBCUTANEOUS at 21:45

## 2019-07-15 RX ADMIN — POLYETHYLENE GLYCOL 3350 17 GRAM(S): 17 POWDER, FOR SOLUTION ORAL at 11:53

## 2019-07-15 RX ADMIN — PANTOPRAZOLE SODIUM 40 MILLIGRAM(S): 20 TABLET, DELAYED RELEASE ORAL at 06:40

## 2019-07-15 RX ADMIN — Medication 50 MILLIGRAM(S): at 05:34

## 2019-07-15 RX ADMIN — Medication 3 MILLIGRAM(S): at 21:45

## 2019-07-15 RX ADMIN — LIDOCAINE 1 PATCH: 4 CREAM TOPICAL at 09:00

## 2019-07-15 RX ADMIN — Medication 6: at 11:53

## 2019-07-15 RX ADMIN — Medication 50 MILLIGRAM(S): at 21:45

## 2019-07-15 RX ADMIN — Medication 10 MILLIGRAM(S): at 06:39

## 2019-07-15 RX ADMIN — Medication 11 UNIT(S): at 11:53

## 2019-07-15 RX ADMIN — Medication 2: at 17:13

## 2019-07-15 RX ADMIN — Medication 50 MICROGRAM(S): at 05:34

## 2019-07-15 RX ADMIN — CHLORHEXIDINE GLUCONATE 1 APPLICATION(S): 213 SOLUTION TOPICAL at 21:58

## 2019-07-15 NOTE — CONSULT NOTE ADULT - PROBLEM SELECTOR RECOMMENDATION 5
- Scrt 2.45 today but downtrending, baseline has been 1.8-2  - Diuresis as above  - Avoid nephrotoxins - SCr 2.45 today but downtrending, baseline has been 1.8-2  - Diuresis as above  - Avoid nephrotoxins

## 2019-07-15 NOTE — CONSULT NOTE ADULT - SUBJECTIVE AND OBJECTIVE BOX
HPI:  Pt is a 74 yo woman with PMHx of HTN, T2DM (A1c 7.4%), CAD s/p CABG (LIMA to LAD, SVG to OM, SVG to PDA 2014 at [facility]), non-dilated ICM (EF 20-25%), severe mitral regurgitation s/p mitral clip (6/13/19 Dr. Newman), severe pulm HTN, CKD (b/l Cr 1.8-2), hypothyroidism, who is presenting to ED after experiencing worsening SOB and intermittent chest pain for 1 week at [facility]. Of note, pt was recently discharged on 6/19 after having mitral clip procedure completed. However, she was never noted to be dyspneic in rehab until 6/20/2019. Reportedly she was feeling well upon discharge, however, she gradually developed worsening exertional dyspnea. Upon presentation, no fevers, chills, nausea, vomiting, cough, sputum production, chest tightness, pressure, palpitations, LOC, syncope.    In the ED, pt afebrile, , /75, RR 22, O2 100% on 2L NC. She subsequently developed worsened work of breathing and was placed on bipap with improvement. She was given , lasix 40mg IVP x1. CXR significant for pulmonary edema. Anemia to 9.3/28.5. BUN/Cr 42/1.72 (54/2.54 on 6/19). BNP 6626. VBG 7.49/29/61/22. She was transferred to the CCU and a lasix drip was started and she responded with an increased urinary output. She was started on levo and dobutamine which she was later weaned off of. Her WBC was elevated at 13.5, she was afebrile and blood cultures were negative. She was started on antibiotics but then later discontinued The leukocytosis was thought to be reactive and has since resolved.    She currently states that she feels tired overall. She endorses of intermittent chest pain and poor appetite. She states that she is able to transfer from the bed to the chair without any increased shortness of breath. She denies lightheadedness, dizziness, orthopnea, PND, abdominal bloating, and lower extremity swelling.      PAST MEDICAL & SURGICAL HISTORY:  GERD (gastroesophageal reflux disease)  CAD (coronary artery disease): s/p CABG  Hypothyroidism  Hyperlipidemia  Diabetes mellitus, type 2  S/P CABG (coronary artery bypass graft)    REVIEW OF SYSTEMS  14 points ROS done and found to be negative or non contributory other than noted in the HPI	    MEDICATIONS  (STANDING):  aspirin enteric coated 81 milliGRAM(s) Oral daily  atorvastatin 40 milliGRAM(s) Oral at bedtime  chlorhexidine 2% Cloths 1 Application(s) Topical daily  clopidogrel Tablet 75 milliGRAM(s) Oral daily  dextrose 5%. 1000 milliLiter(s) (50 mL/Hr) IV Continuous <Continuous>  dextrose 50% Injectable 12.5 Gram(s) IV Push once  dextrose 50% Injectable 25 Gram(s) IV Push once  dextrose 50% Injectable 25 Gram(s) IV Push once  digoxin     Tablet 0.125 milliGRAM(s) Oral daily  docusate sodium 100 milliGRAM(s) Oral two times a day  furosemide   Injectable 40 milliGRAM(s) IV Push daily  hydrALAZINE 50 milliGRAM(s) Oral every 8 hours  insulin glargine Injectable (LANTUS) 28 Unit(s) SubCutaneous at bedtime  insulin lispro (HumaLOG) corrective regimen sliding scale   SubCutaneous three times a day before meals  insulin lispro (HumaLOG) corrective regimen sliding scale   SubCutaneous at bedtime  insulin lispro Injectable (HumaLOG) 11 Unit(s) SubCutaneous three times a day before meals  levothyroxine 50 MICROGram(s) Oral daily  lidocaine   Patch 1 Patch Transdermal daily  melatonin 3 milliGRAM(s) Oral at bedtime  montelukast 10 milliGRAM(s) Oral daily  pantoprazole    Tablet 40 milliGRAM(s) Oral before breakfast  polyethylene glycol 3350 17 Gram(s) Oral daily  senna 2 Tablet(s) Oral at bedtime    MEDICATIONS  (PRN):  acetaminophen   Tablet .. 650 milliGRAM(s) Oral every 6 hours PRN Mild Pain (1 - 3), Moderate Pain (4 - 6)  dextrose 40% Gel 15 Gram(s) Oral once PRN Blood Glucose LESS THAN 70 milliGRAM(s)/deciliter  glucagon  Injectable 1 milliGRAM(s) IntraMuscular once PRN Glucose LESS THAN 70 milligrams/deciliter      Allergies    azithromycin (Hives; Pruritus)    SOCIAL HISTORY: Denies smoking, alcohol use and illicit drug use    FAMILY HISTORY:  Family history of hypertension (Sibling): sister  Family history of diabetes mellitus (Sibling): brothers/sisters      Vital Signs Last 24 Hrs  T(C): 36.7 (15 Jul 2019 11:34), Max: 36.9 (15 Jul 2019 04:14)  T(F): 98.1 (15 Jul 2019 11:34), Max: 98.5 (15 Jul 2019 04:14)  HR: 73 (15 Jul 2019 11:34) (69 - 73)  BP: 110/63 (15 Jul 2019 11:34) (108/62 - 113/63)  RR: 18 (15 Jul 2019 11:34) (17 - 18)  SpO2: 96% (15 Jul 2019 11:34) (96% - 100%)    PHYSICAL EXAM:      General: No acute distress, well developed    Eyes: Conjunctiva clear, PERRLA    Neck: Neck supple, JVP 10-12 cm H2O    Respiratory: Conversational dyspnea, Right lower lobe crackles     Cardiovascular: RRR, S1S2, no murmurs, rubs or gallops, no lower extremity edema    Abdomin: Soft, distended non tender, normoactive bowel sounds     Neurological: Alert and oriented x 3    Psychiatric: Normal affect       LABS:                        7.7    7.82  )-----------( 284      ( 15 Jul 2019 08:26 )             26.1     07-15    131<L>  |  96  |  68<H>  ----------------------------<  233<H>  4.6   |  20<L>  |  2.45<H>    Ca    9.4      15 Jul 2019 06:10  Mg     2.4     07-14      PT/INR - ( 14 Jul 2019 09:32 )   PT: 11.6 sec;   INR: 1.01 ratio      PTT - ( 14 Jul 2019 09:32 )  PTT:48.9 sec    < from: TTE with Doppler (w/Cont) (07.09.19 @ 07:49) >  Dimensions:    Normal Values:  LA:     3.4    2.0 - 4.0 cm  Ao:            2.0 - 3.8 cm  SEPTUM: 1.1    0.6 - 1.2 cm  PWT:    1.1    0.6 - 1.1 cm  LVIDd:  4.3    3.0 - 5.6 cm  LVIDs:  3.9    1.8 - 4.0 cm  Derived variables:  LVMI: 109 g/m2  RWT: 0.51  Fractional short: 9 %  EF (Teicholtz): 21 %  Doppler Peak Velocity (m/sec): AoV=1.0  ------------------------------------------------------------------------  Observations:  Mitral Valve: Mitral Valve Clip. Mild mitral regurgitation.    Aortic Valve/Aorta: Normal trileaflet aortic valve. Peak  transaortic valve gradient equals 4 mm Hg. Minimal aortic  regurgitation.  Peak left ventricular outflow tract  gradient equals 1 mmHg.  Normal aortic root.  Left Atrium: Normal left atrium.  Left Ventricle: Severe global left ventricular systolic  dysfunction. Endocardial visualization enhanced with  intravenous injection of Ultrasonic Enhancing Agent  (Definity). Normal left ventricular internal dimensions and  wall thicknesses.  Right Heart: Normal right atrium. The right ventricle is  not well visualized; grossly normal right ventricular  systolic function. Normal tricuspid valve. Mild-moderate  tricuspid regurgitation. Normal pulmonic valve. Mild  pulmonic regurgitation.  Pericardium/Pleura: Normal pericardium with no pericardial  effusion.  Hemodynamic: Estimated right ventricular systolic pressure  equals 67 mm Hg, assuming right atrial pressure equals 15  mm Hg, consistent with severe pulmonary hypertension.  ------------------------------------------------------------------------  Conclusions:  1. Severe global left ventricular systolic dysfunction.  Endocardial visualization enhanced with intravenous  injection of Ultrasonic Enhancing Agent (Definity).  2. The right ventricle is not well visualized; grossly  normal right ventricular systolic function.  3. Estimated right ventricular systolic pressure equals 67  mm Hg, assuming right atrial pressure equals 15 mm Hg,  consistent with severe pulmonary hypertension.  4. Normal tricuspid valve. Mild-moderate tricuspid  regurgitation.  ------------------------------------------------------------------------  Confirmed on  7/9/2019 - 12:56:01 by Meaghan Arvizu M.D. HPI:  Pt is a 72 yo woman with PMHx of HTN, T2DM (A1c 7.4%), CAD s/p CABG (LIMA to LAD, SVG to OM, SVG to PDA 2014 at [facility]), non-dilated ICM (EF 20-25%), severe mitral regurgitation s/p mitral clip (6/13/19 Dr. Newman), severe pulm HTN, CKD (b/l Cr 1.8-2), hypothyroidism, who is presenting to ED after experiencing worsening SOB and intermittent chest pain for 1 week at [facility]. Of note, pt was recently discharged on 6/19 after having mitral clip procedure completed. However, she was never noted to be dyspneic in rehab until 6/20/2019. Reportedly she was feeling well upon discharge, however, she gradually developed worsening exertional dyspnea. Upon presentation, no fevers, chills, nausea, vomiting, cough, sputum production, chest tightness, pressure, palpitations, LOC, syncope.    In the ED, pt afebrile, , /75, RR 22, O2 100% on 2L NC. She subsequently developed worsened work of breathing and was placed on bipap with improvement. She was given , lasix 40mg IVP x1. CXR significant for pulmonary edema. Anemia to 9.3/28.5. BUN/Cr 42/1.72 (54/2.54 on 6/19). BNP 6626. VBG 7.49/29/61/22. She was transferred to the CCU and a lasix drip was started and she responded with an increased urinary output. She was started on levo and dobutamine which she was later weaned off of. Her WBC was elevated at 13.5, she was afebrile and blood cultures were negative. She was started on antibiotics but then later discontinued The leukocytosis was thought to be reactive and has since resolved.    She currently states that she feels tired overall. She endorses of intermittent chest pain and poor appetite. She states that she is able to transfer from the bed to the chair without any increased shortness of breath. She denies lightheadedness, dizziness, orthopnea, PND, abdominal bloating, and lower extremity swelling.      PAST MEDICAL & SURGICAL HISTORY:  GERD (gastroesophageal reflux disease)  CAD (coronary artery disease): s/p CABG  Hypothyroidism  Hyperlipidemia  Diabetes mellitus, type 2  S/P CABG (coronary artery bypass graft)    REVIEW OF SYSTEMS  14 points ROS done and found to be negative or non contributory other than noted in the HPI	    MEDICATIONS  (STANDING):  aspirin enteric coated 81 milliGRAM(s) Oral daily  atorvastatin 40 milliGRAM(s) Oral at bedtime  chlorhexidine 2% Cloths 1 Application(s) Topical daily  clopidogrel Tablet 75 milliGRAM(s) Oral daily  dextrose 5%. 1000 milliLiter(s) (50 mL/Hr) IV Continuous <Continuous>  dextrose 50% Injectable 12.5 Gram(s) IV Push once  dextrose 50% Injectable 25 Gram(s) IV Push once  dextrose 50% Injectable 25 Gram(s) IV Push once  digoxin     Tablet 0.125 milliGRAM(s) Oral daily  docusate sodium 100 milliGRAM(s) Oral two times a day  furosemide   Injectable 40 milliGRAM(s) IV Push daily  hydrALAZINE 50 milliGRAM(s) Oral every 8 hours  insulin glargine Injectable (LANTUS) 28 Unit(s) SubCutaneous at bedtime  insulin lispro (HumaLOG) corrective regimen sliding scale   SubCutaneous three times a day before meals  insulin lispro (HumaLOG) corrective regimen sliding scale   SubCutaneous at bedtime  insulin lispro Injectable (HumaLOG) 11 Unit(s) SubCutaneous three times a day before meals  levothyroxine 50 MICROGram(s) Oral daily  lidocaine   Patch 1 Patch Transdermal daily  melatonin 3 milliGRAM(s) Oral at bedtime  montelukast 10 milliGRAM(s) Oral daily  pantoprazole    Tablet 40 milliGRAM(s) Oral before breakfast  polyethylene glycol 3350 17 Gram(s) Oral daily  senna 2 Tablet(s) Oral at bedtime    MEDICATIONS  (PRN):  acetaminophen   Tablet .. 650 milliGRAM(s) Oral every 6 hours PRN Mild Pain (1 - 3), Moderate Pain (4 - 6)  dextrose 40% Gel 15 Gram(s) Oral once PRN Blood Glucose LESS THAN 70 milliGRAM(s)/deciliter  glucagon  Injectable 1 milliGRAM(s) IntraMuscular once PRN Glucose LESS THAN 70 milligrams/deciliter      Allergies    azithromycin (Hives; Pruritus)    SOCIAL HISTORY: Denies smoking, alcohol use and illicit drug use    FAMILY HISTORY:  Family history of hypertension (Sibling): sister  Family history of diabetes mellitus (Sibling): brothers/sisters      Vital Signs Last 24 Hrs  T(C): 36.7 (15 Jul 2019 11:34), Max: 36.9 (15 Jul 2019 04:14)  T(F): 98.1 (15 Jul 2019 11:34), Max: 98.5 (15 Jul 2019 04:14)  HR: 73 (15 Jul 2019 11:34) (69 - 73)  BP: 110/63 (15 Jul 2019 11:34) (108/62 - 113/63)  RR: 18 (15 Jul 2019 11:34) (17 - 18)  SpO2: 96% (15 Jul 2019 11:34) (96% - 100%)    PHYSICAL EXAM:      General: No acute distress, well developed    Eyes: Conjunctiva clear, PERRLA    Neck: Neck supple, JVP ~6 cm H2O    Respiratory: Conversational dyspnea, Right lower lobe crackles     Cardiovascular: RRR, S1S2, no murmurs, rubs or gallops, no lower extremity edema    Abdomin: Soft, distended non tender, normoactive bowel sounds     Neurological: Alert and oriented x 3    Psychiatric: Normal affect       LABS:                        7.7    7.82  )-----------( 284      ( 15 Jul 2019 08:26 )             26.1     07-15    131<L>  |  96  |  68<H>  ----------------------------<  233<H>  4.6   |  20<L>  |  2.45<H>    Ca    9.4      15 Jul 2019 06:10  Mg     2.4     07-14      PT/INR - ( 14 Jul 2019 09:32 )   PT: 11.6 sec;   INR: 1.01 ratio      PTT - ( 14 Jul 2019 09:32 )  PTT:48.9 sec    < from: TTE with Doppler (w/Cont) (07.09.19 @ 07:49) >  Dimensions:    Normal Values:  LA:     3.4    2.0 - 4.0 cm  Ao:            2.0 - 3.8 cm  SEPTUM: 1.1    0.6 - 1.2 cm  PWT:    1.1    0.6 - 1.1 cm  LVIDd:  4.3    3.0 - 5.6 cm  LVIDs:  3.9    1.8 - 4.0 cm  Derived variables:  LVMI: 109 g/m2  RWT: 0.51  Fractional short: 9 %  EF (Teicholtz): 21 %  Doppler Peak Velocity (m/sec): AoV=1.0  ------------------------------------------------------------------------  Observations:  Mitral Valve: Mitral Valve Clip. Mild mitral regurgitation.    Aortic Valve/Aorta: Normal trileaflet aortic valve. Peak  transaortic valve gradient equals 4 mm Hg. Minimal aortic  regurgitation.  Peak left ventricular outflow tract  gradient equals 1 mmHg.  Normal aortic root.  Left Atrium: Normal left atrium.  Left Ventricle: Severe global left ventricular systolic  dysfunction. Endocardial visualization enhanced with  intravenous injection of Ultrasonic Enhancing Agent  (Definity). Normal left ventricular internal dimensions and  wall thicknesses.  Right Heart: Normal right atrium. The right ventricle is  not well visualized; grossly normal right ventricular  systolic function. Normal tricuspid valve. Mild-moderate  tricuspid regurgitation. Normal pulmonic valve. Mild  pulmonic regurgitation.  Pericardium/Pleura: Normal pericardium with no pericardial  effusion.  Hemodynamic: Estimated right ventricular systolic pressure  equals 67 mm Hg, assuming right atrial pressure equals 15  mm Hg, consistent with severe pulmonary hypertension.  ------------------------------------------------------------------------  Conclusions:  1. Severe global left ventricular systolic dysfunction.  Endocardial visualization enhanced with intravenous  injection of Ultrasonic Enhancing Agent (Definity).  2. The right ventricle is not well visualized; grossly  normal right ventricular systolic function.  3. Estimated right ventricular systolic pressure equals 67  mm Hg, assuming right atrial pressure equals 15 mm Hg,  consistent with severe pulmonary hypertension.  4. Normal tricuspid valve. Mild-moderate tricuspid  regurgitation.  ------------------------------------------------------------------------  Confirmed on  7/9/2019 - 12:56:01 by Meaghan Arvizu M.D. HPI:  Pt is a 74 yo woman with PMHx of HTN, T2DM (A1c 7.4%), CAD s/p CABG (LIMA to LAD, SVG to OM, SVG to PDA 2014 at [facility]), non-dilated ICM (EF 20-25%), severe mitral regurgitation s/p mitral clip (6/13/19 Dr. Newman), severe pulm HTN, CKD (b/l Cr 1.8-2), hypothyroidism, who is presenting to ED after experiencing worsening SOB and intermittent chest pain for 1 week at [facility]. Of note, pt was recently discharged on 6/19 after having mitral clip procedure completed. However, she was never noted to be dyspneic in rehab until 7/5/2019. Reportedly she was feeling well upon discharge, however, she gradually developed worsening exertional dyspnea. Upon presentation, no fevers, chills, nausea, vomiting, cough, sputum production, chest tightness, pressure, palpitations, LOC, syncope.    In the ED, pt afebrile, , /75, RR 22, O2 100% on 2L NC. She subsequently developed worsened work of breathing and was placed on bipap with improvement. She was given , lasix 40mg IVP x1. CXR significant for pulmonary edema. Anemia to 9.3/28.5. BUN/Cr 42/1.72 (54/2.54 on 6/19). BNP 6626. VBG 7.49/29/61/22. She was transferred to the CCU and a lasix drip was started and she responded with an increased urinary output. She was started on levo and dobutamine which she was later weaned off of. Her WBC was elevated at 13.5, she was afebrile and blood cultures were negative. She was started on antibiotics but then later discontinued The leukocytosis was thought to be reactive and has since resolved.    She currently states that she feels tired overall. She endorses of intermittent chest pain and poor appetite. She states that she is able to transfer from the bed to the chair without any increased shortness of breath. She denies lightheadedness, dizziness, orthopnea, PND, abdominal bloating, and lower extremity swelling.      PAST MEDICAL & SURGICAL HISTORY:  GERD (gastroesophageal reflux disease)  CAD (coronary artery disease): s/p CABG  Hypothyroidism  Hyperlipidemia  Diabetes mellitus, type 2  S/P CABG (coronary artery bypass graft)    REVIEW OF SYSTEMS  14 points ROS done and found to be negative or non contributory other than noted in the HPI	    MEDICATIONS  (STANDING):  aspirin enteric coated 81 milliGRAM(s) Oral daily  atorvastatin 40 milliGRAM(s) Oral at bedtime  chlorhexidine 2% Cloths 1 Application(s) Topical daily  clopidogrel Tablet 75 milliGRAM(s) Oral daily  dextrose 5%. 1000 milliLiter(s) (50 mL/Hr) IV Continuous <Continuous>  dextrose 50% Injectable 12.5 Gram(s) IV Push once  dextrose 50% Injectable 25 Gram(s) IV Push once  dextrose 50% Injectable 25 Gram(s) IV Push once  digoxin     Tablet 0.125 milliGRAM(s) Oral daily  docusate sodium 100 milliGRAM(s) Oral two times a day  furosemide   Injectable 40 milliGRAM(s) IV Push daily  hydrALAZINE 50 milliGRAM(s) Oral every 8 hours  insulin glargine Injectable (LANTUS) 28 Unit(s) SubCutaneous at bedtime  insulin lispro (HumaLOG) corrective regimen sliding scale   SubCutaneous three times a day before meals  insulin lispro (HumaLOG) corrective regimen sliding scale   SubCutaneous at bedtime  insulin lispro Injectable (HumaLOG) 11 Unit(s) SubCutaneous three times a day before meals  levothyroxine 50 MICROGram(s) Oral daily  lidocaine   Patch 1 Patch Transdermal daily  melatonin 3 milliGRAM(s) Oral at bedtime  montelukast 10 milliGRAM(s) Oral daily  pantoprazole    Tablet 40 milliGRAM(s) Oral before breakfast  polyethylene glycol 3350 17 Gram(s) Oral daily  senna 2 Tablet(s) Oral at bedtime    MEDICATIONS  (PRN):  acetaminophen   Tablet .. 650 milliGRAM(s) Oral every 6 hours PRN Mild Pain (1 - 3), Moderate Pain (4 - 6)  dextrose 40% Gel 15 Gram(s) Oral once PRN Blood Glucose LESS THAN 70 milliGRAM(s)/deciliter  glucagon  Injectable 1 milliGRAM(s) IntraMuscular once PRN Glucose LESS THAN 70 milligrams/deciliter      Allergies    azithromycin (Hives; Pruritus)    SOCIAL HISTORY: Denies smoking, alcohol use and illicit drug use    FAMILY HISTORY:  Family history of hypertension (Sibling): sister  Family history of diabetes mellitus (Sibling): brothers/sisters      Vital Signs Last 24 Hrs  T(C): 36.7 (15 Jul 2019 11:34), Max: 36.9 (15 Jul 2019 04:14)  T(F): 98.1 (15 Jul 2019 11:34), Max: 98.5 (15 Jul 2019 04:14)  HR: 73 (15 Jul 2019 11:34) (69 - 73)  BP: 110/63 (15 Jul 2019 11:34) (108/62 - 113/63)  RR: 18 (15 Jul 2019 11:34) (17 - 18)  SpO2: 96% (15 Jul 2019 11:34) (96% - 100%)    PHYSICAL EXAM:      General: No acute distress, well developed    Eyes: Conjunctiva clear, PERRLA    Neck: Neck supple, JVP 8-10 cm H2O    Respiratory: Conversational dyspnea, Right lower lobe crackles     Cardiovascular: RRR, S1S2, no murmurs, rubs or gallops, no lower extremity edema    Abdomin: Soft, distended non tender, normoactive bowel sounds     Neurological: Alert and oriented x 3    Psychiatric: Normal affect       LABS:                        7.7    7.82  )-----------( 284      ( 15 Jul 2019 08:26 )             26.1     07-15    131<L>  |  96  |  68<H>  ----------------------------<  233<H>  4.6   |  20<L>  |  2.45<H>    Ca    9.4      15 Jul 2019 06:10  Mg     2.4     07-14      PT/INR - ( 14 Jul 2019 09:32 )   PT: 11.6 sec;   INR: 1.01 ratio      PTT - ( 14 Jul 2019 09:32 )  PTT:48.9 sec    < from: TTE with Doppler (w/Cont) (07.09.19 @ 07:49) >  Dimensions:    Normal Values:  LA:     3.4    2.0 - 4.0 cm  Ao:            2.0 - 3.8 cm  SEPTUM: 1.1    0.6 - 1.2 cm  PWT:    1.1    0.6 - 1.1 cm  LVIDd:  4.3    3.0 - 5.6 cm  LVIDs:  3.9    1.8 - 4.0 cm  Derived variables:  LVMI: 109 g/m2  RWT: 0.51  Fractional short: 9 %  EF (Teicholtz): 21 %  Doppler Peak Velocity (m/sec): AoV=1.0  ------------------------------------------------------------------------  Observations:  Mitral Valve: Mitral Valve Clip. Mild mitral regurgitation.    Aortic Valve/Aorta: Normal trileaflet aortic valve. Peak  transaortic valve gradient equals 4 mm Hg. Minimal aortic  regurgitation.  Peak left ventricular outflow tract  gradient equals 1 mmHg.  Normal aortic root.  Left Atrium: Normal left atrium.  Left Ventricle: Severe global left ventricular systolic  dysfunction. Endocardial visualization enhanced with  intravenous injection of Ultrasonic Enhancing Agent  (Definity). Normal left ventricular internal dimensions and  wall thicknesses.  Right Heart: Normal right atrium. The right ventricle is  not well visualized; grossly normal right ventricular  systolic function. Normal tricuspid valve. Mild-moderate  tricuspid regurgitation. Normal pulmonic valve. Mild  pulmonic regurgitation.  Pericardium/Pleura: Normal pericardium with no pericardial  effusion.  Hemodynamic: Estimated right ventricular systolic pressure  equals 67 mm Hg, assuming right atrial pressure equals 15  mm Hg, consistent with severe pulmonary hypertension.  ------------------------------------------------------------------------  Conclusions:  1. Severe global left ventricular systolic dysfunction.  Endocardial visualization enhanced with intravenous  injection of Ultrasonic Enhancing Agent (Definity).  2. The right ventricle is not well visualized; grossly  normal right ventricular systolic function.  3. Estimated right ventricular systolic pressure equals 67  mm Hg, assuming right atrial pressure equals 15 mm Hg,  consistent with severe pulmonary hypertension.  4. Normal tricuspid valve. Mild-moderate tricuspid  regurgitation.  ------------------------------------------------------------------------  Confirmed on  7/9/2019 - 12:56:01 by Meaghan Arvizu M.D.

## 2019-07-15 NOTE — CONSULT NOTE ADULT - ASSESSMENT
Pt is a 72 yo woman with PMHx of HTN, T2DM (A1c 7.4%), CAD s/p CABG (LIMA to LAD, SVG to OM, SVG to PDA 2014 at [facility]), non-dilated ICM (EF 20-25%), severe mitral regurgitation s/p mitral clip (6/13/19 Dr. Newman), severe pulm HTN, CKD, hypothyroidism, who is presenting to ED after experiencing worsening SO[facility] and [facility] Of note, pt was recently discharged on 6/19 after having mitral clip procedure completed. She initially required BiPAP with improvement in her respiratory status following diuresis. Initiated on vasopressors and inotropes in CCU, which were subsequently weaned off. Infectious work up was negative.    She currently appears to be hypervolemic with evidence of elevated filling pressures on exam. NYHA Class IIIb symptoms. She is down about 4 kg since admission. Her Scrt is currently elevated above her baseline, however gradually downtrending over the past few days and she is normotensive. Pt is a 74 yo woman with PMHx of HTN, T2DM (A1c 7.4%), CAD s/p CABG (LIMA to LAD, SVG to OM, SVG to PDA 2014 at [facility]), non-dilated ICM (EF 20-25%), severe mitral regurgitation s/p mitral clip (6/13/19 Dr. Newman), severe pulm HTN, CKD, hypothyroidism, who is presenting to ED after [facility] Of note, pt was recently discharged on 6/19 after having mitral clip procedure completed. She initially required BiPAP with improvement in her respiratory status following diuresis. Initiated on vasopressors and inotropes in CCU, which were subsequently weaned off. Infectious work up was negative.    She currently appears close to euvolemic on exam. NYHA Class IIIb symptoms. She is down about 4 kg since admission. Her Scrt is currently elevated above her baseline, however gradually downtrending over the past few days off lasix gtt. She is normotensive. Pt is a 72 yo woman with PMHx of HTN, T2DM (A1c 7.4%), CAD s/p CABG (LIMA to LAD, SVG to OM, SVG to PDA 2014 at [facility]), non-dilated ICM (EF 20-25%), severe mitral regurgitation s/p mitral clip (6/13/19 Dr. Newman), severe pulm HTN, CKD, hypothyroidism, who is presenting to ED after [facility] Of note, pt was recently discharged on 6/19 after having mitral clip procedure completed. She initially required BiPAP with improvement in her respiratory status following diuresis. Initiated on vasopressors and inotropes in CCU, which were subsequently weaned off. Infectious work up was negative.    She currently mildly volume up on exam. NYHA Class IIIb symptoms. She is down about 4 kg since admission. Her Scrt is currently elevated above her baseline, however gradually downtrending over the past few days off lasix gtt. She is normotensive.

## 2019-07-15 NOTE — PROGRESS NOTE ADULT - SUBJECTIVE AND OBJECTIVE BOX
[facility]   MD RAHEEL SHAH MD RUORU WONG, PA    TEL:  OFFICE: [phone]  DR SANTOYO CELL: [phone]  JUSTEN KENDALL CELL: [phone]  DR. NGUYEN CELL: [phone]    RENAL FOLLOW UP NOTE  --------------------------------------------------------------------------------  HPI:      Pt seen and examined at bedside.       PAST HISTORY  --------------------------------------------------------------------------------  No significant changes to PMH, PSH, FHx, SHx, unless otherwise noted    ALLERGIES & MEDICATIONS  --------------------------------------------------------------------------------  Allergies    azithromycin (Hives; Pruritus)    Intolerances      Standing Inpatient Medications  aspirin enteric coated 81 milliGRAM(s) Oral daily  atorvastatin 40 milliGRAM(s) Oral at bedtime  chlorhexidine 2% Cloths 1 Application(s) Topical daily  clopidogrel Tablet 75 milliGRAM(s) Oral daily  dextrose 5%. 1000 milliLiter(s) IV Continuous <Continuous>  dextrose 50% Injectable 12.5 Gram(s) IV Push once  dextrose 50% Injectable 25 Gram(s) IV Push once  dextrose 50% Injectable 25 Gram(s) IV Push once  digoxin     Tablet 0.125 milliGRAM(s) Oral daily  docusate sodium 100 milliGRAM(s) Oral two times a day  furosemide   Injectable 40 milliGRAM(s) IV Push daily  hydrALAZINE 50 milliGRAM(s) Oral every 8 hours  insulin glargine Injectable (LANTUS) 28 Unit(s) SubCutaneous at bedtime  insulin lispro (HumaLOG) corrective regimen sliding scale   SubCutaneous three times a day before meals  insulin lispro (HumaLOG) corrective regimen sliding scale   SubCutaneous at bedtime  insulin lispro Injectable (HumaLOG) 11 Unit(s) SubCutaneous three times a day before meals  levothyroxine 50 MICROGram(s) Oral daily  lidocaine   Patch 1 Patch Transdermal daily  melatonin 3 milliGRAM(s) Oral at bedtime  montelukast 10 milliGRAM(s) Oral daily  pantoprazole    Tablet 40 milliGRAM(s) Oral before breakfast  polyethylene glycol 3350 17 Gram(s) Oral daily  senna 2 Tablet(s) Oral at bedtime    PRN Inpatient Medications  acetaminophen   Tablet .. 650 milliGRAM(s) Oral every 6 hours PRN  dextrose 40% Gel 15 Gram(s) Oral once PRN  glucagon  Injectable 1 milliGRAM(s) IntraMuscular once PRN      REVIEW OF SYSTEMS  --------------------------------------------------------------------------------  General: no fever  CVS: no chest pain  RESP: + sob, no cough  ABD: no abdominal pain  : no dysuria,  MSK: no edema     VITALS/PHYSICAL EXAM  --------------------------------------------------------------------------------  T(C): 36.7 (07-15-19 @ 11:34), Max: 36.9 (07-15-19 @ 04:14)  HR: 73 (07-15-19 @ 11:34) (69 - 73)  BP: 110/63 (07-15-19 @ 11:34) (108/62 - 113/63)  RR: 18 (07-15-19 @ 11:34) (17 - 18)  SpO2: 96% (07-15-19 @ 11:34) (96% - 100%)  Wt(kg): --        07-14-19 @ 07:01  -  07-15-19 @ 07:00  --------------------------------------------------------  IN: 1100 mL / OUT: 340 mL / NET: 760 mL    07-15-19 @ 07:01  -  07-15-19 @ 13:33  --------------------------------------------------------  IN: 460 mL / OUT: 400 mL / NET: 60 mL      Physical Exam:  	Gen: NAD  	HEENT: MMM  	Pulm: Crackles B/L  	CV: S1S2  	Abd: Soft, +BS  	Ext: No LE edema B/L                      Neuro: Awake   	Skin: Warm and Dry   	 no polo    LABS/STUDIES  --------------------------------------------------------------------------------              7.7    7.82  >-----------<  284      [07-15-19 @ 08:26]              26.1     131  |  96  |  68  ----------------------------<  233      [07-15-19 @ 06:10]  4.6   |  20  |  2.45        Ca     9.4     [07-15-19 @ 06:10]      Mg     2.4     [07-14-19 @ 06:45]      PT/INR: PT 11.6 , INR 1.01       [07-14-19 @ 09:32]  PTT: 48.9       [07-14-19 @ 09:32]      Creatinine Trend:  SCr 2.45 [07-15 @ 06:10]  SCr 2.59 [07-14 @ 06:45]  SCr 2.72 [07-13 @ 07:09]  SCr 2.72 [07-13 @ 07:01]  SCr 2.41 [07-12 @ 04:26]    Urinalysis - [07-09-19 @ 13:28]      Color Yellow / Appearance Clear / SG 1.012 / pH 6.0      Gluc Negative / Ketone Negative  / Bili Negative / Urobili Negative       Blood Trace / Protein Trace / Leuk Est Negative / Nitrite Negative      RBC 1 / WBC 1 / Hyaline 0 / Gran  / Sq Epi  / Non Sq Epi 0 / Bacteria Moderate      Iron 42, TIBC 348, %sat 12      [07-05-19 @ 22:32]  Ferritin 130      [07-05-19 @ 22:32]  .4 (Ca --)      [04-25-19 @ 05:45]   --  PTH 43.55 (Ca --)      [09-10-18 @ 07:15]   --  PTH 36.60 (Ca --)      [09-08-18 @ 03:15]   --  Vitamin D (25OH) 25.9      [05-01-19 @ 04:00]  HbA1c 7.4      [07-05-19 @ 22:32]  TSH 2.00      [07-05-19 @ 22:32]  Lipid: chol 148, , HDL 35,       [06-01-19 @ 08:00]

## 2019-07-15 NOTE — CONSULT NOTE ADULT - ATTENDING COMMENTS
Asked to see this lady for an ICM, LVEF 20-25%, s/p MitraClip for severe MR, who had a prolonged CCU course with acute on chronic systolic HF, diuresed in the CCU with some levophed &  support, currently on Tele floor and seems to be improving. She remains fluid overloaded, though, with bibasilar rales posteriorly, JVP 8-10 cm H2O, and she remains tired with a diminished appetite per her daughter.    Digoxin level > 2.0 last week. Please discontinue.  Would continue with gentle IV diuresis with lasix 40 mg IV daily.  Continue hydralazine 50 mg po TID, hold SBP < 90.  May consider addition of ISDN 10 mg TID tomorrow.  Will ask primary team to ensure no GIB losses as her Hgb is trending down.

## 2019-07-15 NOTE — PROGRESS NOTE ADULT - SUBJECTIVE AND OBJECTIVE BOX
CHIEF COMPLAINT:    SUBJECTIVE:     REVIEW OF SYSTEMS:    CONSTITUTIONAL: (  )  weakness,  (  ) fevers or chills  EYES/ENT: (  )visual changes;     NECK: (  ) pain or stiffness  RESPIRATORY:   (  )cough, wheezing, hemoptysis;  (  ) shortness of breath  CARDIOVASCULAR:  (  )chest pain or palpitations  GASTROINTESTINAL:   (  )abdominal or epigastric pain.  (  ) nausea, vomiting, or hematemesis;   (   ) diarrhea or constipation.   GENITOURINARY:   (    ) dysuria, frequency or hematuria  NEUROLOGICAL:  (   ) numbness or weakness   All other review of systems is negative unless indicated above    Vital Signs Last 24 Hrs  T(C): 36.8 (15 Jul 2019 19:58), Max: 36.9 (15 Jul 2019 04:14)  T(F): 98.2 (15 Jul 2019 19:58), Max: 98.5 (15 Jul 2019 04:14)  HR: 73 (15 Jul 2019 19:58) (70 - 74)  BP: 105/52 (15 Jul 2019 19:58) (105/52 - 117/58)  BP(mean): --  RR: 18 (15 Jul 2019 19:58) (18 - 18)  SpO2: 98% (15 Jul 2019 19:58) (96% - 100%)    I&O's Summary    14 Jul 2019 07:01  -  15 Jul 2019 07:00  --------------------------------------------------------  IN: 1100 mL / OUT: 340 mL / NET: 760 mL    15 Jul 2019 07:01  -  15 Jul 2019 20:36  --------------------------------------------------------  IN: 660 mL / OUT: 790 mL / NET: -130 mL        CAPILLARY BLOOD GLUCOSE      POCT Blood Glucose.: 240 mg/dL (15 Jul 2019 16:40)  POCT Blood Glucose.: 454 mg/dL (15 Jul 2019 11:42)  POCT Blood Glucose.: 454 mg/dL (15 Jul 2019 11:41)  POCT Blood Glucose.: 344 mg/dL (15 Jul 2019 08:51)  POCT Blood Glucose.: 301 mg/dL (15 Jul 2019 07:46)  POCT Blood Glucose.: 206 mg/dL (14 Jul 2019 21:16)      PHYSICAL EXAM:    Constitutional:  (   ) NAD,   (   )awake and alert  HEENT: PERR, EOMI,    Neck: Soft and supple, No LAD, No JVD  Respiratory:  (    Breath sounds are clear bilaterally,    (   ) wheezing, rales or rhonchi  Cardiovascular:     (   )S1 and S2, regular rate and rhythm, no Murmurs, gallops or rubs  Gastrointestinal:  (   )Bowel Sounds present, soft,   (  )nontender, nondistended,    Extremities:    (  ) peripheral edema  Vascular: 2+ peripheral pulses  Neurological:    (    )A/O x 3,   (  ) focal deficits  Musculoskeletal:    (   )  normal strength b/l upper  (     ) normal  lower extremities  Skin: No rashes    MEDICATIONS:  MEDICATIONS  (STANDING):  aspirin enteric coated 81 milliGRAM(s) Oral daily  atorvastatin 40 milliGRAM(s) Oral at bedtime  chlorhexidine 2% Cloths 1 Application(s) Topical daily  clopidogrel Tablet 75 milliGRAM(s) Oral daily  dextrose 5%. 1000 milliLiter(s) (50 mL/Hr) IV Continuous <Continuous>  dextrose 50% Injectable 12.5 Gram(s) IV Push once  dextrose 50% Injectable 25 Gram(s) IV Push once  dextrose 50% Injectable 25 Gram(s) IV Push once  digoxin     Tablet 0.125 milliGRAM(s) Oral daily  docusate sodium 100 milliGRAM(s) Oral two times a day  furosemide   Injectable 40 milliGRAM(s) IV Push daily  hydrALAZINE 50 milliGRAM(s) Oral every 8 hours  insulin glargine Injectable (LANTUS) 28 Unit(s) SubCutaneous at bedtime  insulin lispro (HumaLOG) corrective regimen sliding scale   SubCutaneous three times a day before meals  insulin lispro (HumaLOG) corrective regimen sliding scale   SubCutaneous at bedtime  insulin lispro Injectable (HumaLOG) 11 Unit(s) SubCutaneous three times a day before meals  levothyroxine 50 MICROGram(s) Oral daily  lidocaine   Patch 1 Patch Transdermal daily  melatonin 3 milliGRAM(s) Oral at bedtime  montelukast 10 milliGRAM(s) Oral daily  pantoprazole    Tablet 40 milliGRAM(s) Oral before breakfast  polyethylene glycol 3350 17 Gram(s) Oral daily  senna 2 Tablet(s) Oral at bedtime      LABS: All Labs Reviewed:                        7.7    7.82  )-----------( 284      ( 15 Jul 2019 08:26 )             26.1     07-15    131<L>  |  96  |  68<H>  ----------------------------<  233<H>  4.6   |  20<L>  |  2.45<H>    Ca    9.4      15 Jul 2019 06:10  Mg     2.4     07-14      PT/INR - ( 14 Jul 2019 09:32 )   PT: 11.6 sec;   INR: 1.01 ratio         PTT - ( 14 Jul 2019 09:32 )  PTT:48.9 sec      Blood Culture:   Urine Culture      RADIOLOGY/EKG:    ASSESSMENT AND PLAN:    DVT PPX:    ADVANCED DIRECTIVE:    DISPOSITION: CHIEF COMPLAINT:  Pt is a 72 yo woman with PMHx of HTN, T2DM (A1c 7.4%), CAD s/p CABG (LIMA to LAD, SVG to OM, SVG to PDA 2014 at [facility]), non-dilated ICM (EF 20-25%), severe mitral regurgitation s/p mitral clip (6/13/19 Dr. Newman), severe pulm HTN, CKD (b/l Cr 1.8-2), hypothyroidism, who is presenting to ED after experiencing worsening SOB and intermittent chest pain for 1 week at [place] [facility]. Of note, pt was recently discharged on 6/19 after having mitral clip procedure completed. However, she was never noted to be dyspneic in rehab until 7/5/2019. Reportedly she was feeling well upon discharge, however, she gradually developed worsening exertional dyspnea. Upon presentation, no fevers, chills, nausea, vomiting, cough, sputum production, chest tightness, pressure, palpitations, LOC, syncope.  SUBJECTIVE:     REVIEW OF SYSTEMS:    CONSTITUTIONAL: ( x )  weakness,  (  ) fevers or chills  EYES/ENT: (  )visual changes;     NECK: (  ) pain or stiffness  RESPIRATORY:   (  )cough, wheezing, hemoptysis;  ( x ) shortness of breath  CARDIOVASCULAR:  (  )chest pain or palpitations  GASTROINTESTINAL:   (  )abdominal or epigastric pain.  (  ) nausea, vomiting, or hematemesis;   (   ) diarrhea or constipation.   GENITOURINARY:   (    ) dysuria, frequency or hematuria  NEUROLOGICAL:  (   ) numbness or weakness   All other review of systems is negative unless indicated above    Vital Signs Last 24 Hrs  T(C): 36.8 (15 Jul 2019 19:58), Max: 36.9 (15 Jul 2019 04:14)  T(F): 98.2 (15 Jul 2019 19:58), Max: 98.5 (15 Jul 2019 04:14)  HR: 73 (15 Jul 2019 19:58) (70 - 74)  BP: 105/52 (15 Jul 2019 19:58) (105/52 - 117/58)  BP(mean): --  RR: 18 (15 Jul 2019 19:58) (18 - 18)  SpO2: 98% (15 Jul 2019 19:58) (96% - 100%)    I&O's Summary    14 Jul 2019 07:01  -  15 Jul 2019 07:00  --------------------------------------------------------  IN: 1100 mL / OUT: 340 mL / NET: 760 mL    15 Jul 2019 07:01  -  15 Jul 2019 20:36  --------------------------------------------------------  IN: 660 mL / OUT: 790 mL / NET: -130 mL        CAPILLARY BLOOD GLUCOSE      POCT Blood Glucose.: 240 mg/dL (15 Jul 2019 16:40)  POCT Blood Glucose.: 454 mg/dL (15 Jul 2019 11:42)  POCT Blood Glucose.: 454 mg/dL (15 Jul 2019 11:41)  POCT Blood Glucose.: 344 mg/dL (15 Jul 2019 08:51)  POCT Blood Glucose.: 301 mg/dL (15 Jul 2019 07:46)  POCT Blood Glucose.: 206 mg/dL (14 Jul 2019 21:16)      PHYSICAL EXAM:     General: No acute distress,      Eyes: Conjunctiva clear, PERRLA    Neck: Neck supple, JVP 8-10 cm H2O    Respiratory: Conversational dyspnea, Right lower lobe crackles     Cardiovascular: RRR, S1S2, no murmurs, rubs or gallops, no lower extremity edema    Abdomin: Soft, distended non tender, normoactive bowel sounds     Neurological: Alert and oriented x 3    Psychiatric: Normal affect   MEDICATIONS:  MEDICATIONS  (STANDING):  aspirin enteric coated 81 milliGRAM(s) Oral daily  atorvastatin 40 milliGRAM(s) Oral at bedtime  chlorhexidine 2% Cloths 1 Application(s) Topical daily  clopidogrel Tablet 75 milliGRAM(s) Oral daily  dextrose 5%. 1000 milliLiter(s) (50 mL/Hr) IV Continuous <Continuous>  dextrose 50% Injectable 12.5 Gram(s) IV Push once  dextrose 50% Injectable 25 Gram(s) IV Push once  dextrose 50% Injectable 25 Gram(s) IV Push once  digoxin     Tablet 0.125 milliGRAM(s) Oral daily  docusate sodium 100 milliGRAM(s) Oral two times a day  furosemide   Injectable 40 milliGRAM(s) IV Push daily  hydrALAZINE 50 milliGRAM(s) Oral every 8 hours  insulin glargine Injectable (LANTUS) 28 Unit(s) SubCutaneous at bedtime  insulin lispro (HumaLOG) corrective regimen sliding scale   SubCutaneous three times a day before meals  insulin lispro (HumaLOG) corrective regimen sliding scale   SubCutaneous at bedtime  insulin lispro Injectable (HumaLOG) 11 Unit(s) SubCutaneous three times a day before meals  levothyroxine 50 MICROGram(s) Oral daily  lidocaine   Patch 1 Patch Transdermal daily  melatonin 3 milliGRAM(s) Oral at bedtime  montelukast 10 milliGRAM(s) Oral daily  pantoprazole    Tablet 40 milliGRAM(s) Oral before breakfast  polyethylene glycol 3350 17 Gram(s) Oral daily  senna 2 Tablet(s) Oral at bedtime      LABS: All Labs Reviewed:                        7.7    7.82  )-----------( 284      ( 15 Jul 2019 08:26 )             26.1     07-15    131<L>  |  96  |  68<H>  ----------------------------<  233<H>  4.6   |  20<L>  |  2.45<H>    Ca    9.4      15 Jul 2019 06:10  Mg     2.4     07-14      PT/INR - ( 14 Jul 2019 09:32 )   PT: 11.6 sec;   INR: 1.01 ratio         PTT - ( 14 Jul 2019 09:32 )  PTT:48.9 sec      Blood Culture:   Urine Culture      RADIOLOGY/EKG:    ASSESSMENT AND PLAN:  Pt is a 72 yo woman with PMHx of HTN, T2DM (A1c 7.4%), CAD s/p CABG (LIMA to LAD, SVG to OM, SVG to PDA 2014 at [facility]), non-dilated ICM (EF 20-25%), severe mitral regurgitation s/p mitral clip (6/13/19 Dr. Newman), severe pulm HTN, CKD, hypothyroidism, who is presenting to ED after [facility] worsening [facility] and [facility] Of note, pt was recently discharged on 6/19 after having mitral clip procedure completed. She initially required BiPAP with improvement in her respiratory status following diuresis. Initiated on vasopressors and inotropes in CCU, which were subsequently weaned off. Infectious work up was negative.    She currently mildly volume up on exam. NYHA Class IIIb symptoms. She is down about 4 kg since admission. Her Scrt is currently elevated above her baseline, however gradually downtrending over the past few days off lasix gtt. She is normotensive.     Problem/Recommendation - 1:  Problem: Acute on chronic systolic heart failure. Recommendation: - Continue current dose of diuretics  - Continue hydralazine 50mg PO TID, hold for SBP < 90  - No ARB/ARNI/MRA at this time due to renal dysfunction  - Daily standing weights, strict I &Os.    Problem/Recommendation - 2:  ·  Problem: Severe mitral regurgitation.  Recommendation: - s/p Mitral clip   - TTE on 7/9: mild MR.     Problem/Recommendation - 3:  ·  Problem: CAD (coronary artery disease).  Recommendation: -Continue ASA, clopidogrel.   Problem/Recommendation - 4:  ·  Problem: SVT (supraventricular tachycardia).  Recommendation: - Discontinue digoxin as level was significantly elevated at 2.1.     Problem/Recommendation - 5:  ·  Problem: Acute renal failure.  Recommendation: - SCr 2.45 today but downtrending, baseline has been 1.8-2  - Diuresis as above  - Avoid nephrotoxins.  DVT PPX:    ADVANCED DIRECTIVE:    DISPOSITION:  home when stable?

## 2019-07-15 NOTE — PROGRESS NOTE ADULT - ASSESSMENT
Pt is a 72 yo woman with PMHx of HTN, T2DM, CAD s/p CABG (LIMA to LAD, SVG to OM, SVG to PDA 2014 at [facility]), non-dilated ICM (EF 20-25%), severe mitral regurgitation s/p mitral clip (6/13/19 Dr. Newman), severe pulm HTN, CKD, hypothyroidism, who is presenting to ED after experiencing worsening SOB      A/P:      MERVIN  MERVIN sec to cardiogenic shock  Renal function improving  PT non -oliguric  On lasix 40mg IV   Monitor renal function   Avoid further nephrotoxics, NSAIDS RCA    CKD stage 4:  baseline Scr 1.8-2.0  Renal function fluctuates sec to CHF  Monitor renal function at present    SOB:  in setting of HF  COntinue diuretics per cardiology    ACidosis   sec to RF  mOnitor serum Co2 at present

## 2019-07-15 NOTE — CONSULT NOTE ADULT - PROBLEM SELECTOR RECOMMENDATION 9
- Will hold on initiating BB until euvolemic  - Daily standing weights, strict I &Os - Continue current dose of diuretics  - Continue hydralazine 50mg PO TID, hold for SBP < 90  - Consider starting ISDN 5mg PO TID, hold for SBP < 90  - Consider starting low dose beta blocker, Toprol 12.5mg PO daily  - No ARB/ARNI/MRA at this time due to renal dysfunction  - Daily standing weights, strict I &Os - Continue current dose of diuretics  - Continue hydralazine 50mg PO TID, hold for SBP < 90  - No ARB/ARNI/MRA at this time due to renal dysfunction  - Daily standing weights, strict I &Os

## 2019-07-16 LAB
ALBUMIN SERPL ELPH-MCNC: 3.5 G/DL — SIGNIFICANT CHANGE UP (ref 3.3–5)
ALP SERPL-CCNC: 133 U/L — HIGH (ref 40–120)
ALT FLD-CCNC: 131 U/L — HIGH (ref 10–45)
ANION GAP SERPL CALC-SCNC: 19 MMOL/L — HIGH (ref 5–17)
AST SERPL-CCNC: 30 U/L — SIGNIFICANT CHANGE UP (ref 10–40)
BILIRUB SERPL-MCNC: 0.2 MG/DL — SIGNIFICANT CHANGE UP (ref 0.2–1.2)
BUN SERPL-MCNC: 71 MG/DL — HIGH (ref 7–23)
CALCIUM SERPL-MCNC: 9.3 MG/DL — SIGNIFICANT CHANGE UP (ref 8.4–10.5)
CHLORIDE SERPL-SCNC: 94 MMOL/L — LOW (ref 96–108)
CO2 SERPL-SCNC: 20 MMOL/L — LOW (ref 22–31)
CREAT SERPL-MCNC: 2.31 MG/DL — HIGH (ref 0.5–1.3)
DIGOXIN SERPL-MCNC: 2.8 NG/ML — CRITICAL HIGH (ref 0.8–2)
GLUCOSE BLDC GLUCOMTR-MCNC: 275 MG/DL — HIGH (ref 70–99)
GLUCOSE BLDC GLUCOMTR-MCNC: 302 MG/DL — HIGH (ref 70–99)
GLUCOSE BLDC GLUCOMTR-MCNC: 317 MG/DL — HIGH (ref 70–99)
GLUCOSE BLDC GLUCOMTR-MCNC: 366 MG/DL — HIGH (ref 70–99)
GLUCOSE SERPL-MCNC: 273 MG/DL — HIGH (ref 70–99)
HCT VFR BLD CALC: 24.2 % — LOW (ref 34.5–45)
HGB BLD-MCNC: 8.1 G/DL — LOW (ref 11.5–15.5)
MAGNESIUM SERPL-MCNC: 2.3 MG/DL — SIGNIFICANT CHANGE UP (ref 1.6–2.6)
MCHC RBC-ENTMCNC: 28.7 PG — SIGNIFICANT CHANGE UP (ref 27–34)
MCHC RBC-ENTMCNC: 33.6 GM/DL — SIGNIFICANT CHANGE UP (ref 32–36)
MCV RBC AUTO: 85.4 FL — SIGNIFICANT CHANGE UP (ref 80–100)
PHOSPHATE SERPL-MCNC: 4.1 MG/DL — SIGNIFICANT CHANGE UP (ref 2.5–4.5)
PLATELET # BLD AUTO: 291 K/UL — SIGNIFICANT CHANGE UP (ref 150–400)
POTASSIUM SERPL-MCNC: 4.6 MMOL/L — SIGNIFICANT CHANGE UP (ref 3.5–5.3)
POTASSIUM SERPL-SCNC: 4.6 MMOL/L — SIGNIFICANT CHANGE UP (ref 3.5–5.3)
PROT SERPL-MCNC: 7.4 G/DL — SIGNIFICANT CHANGE UP (ref 6–8.3)
RBC # BLD: 2.83 M/UL — LOW (ref 3.8–5.2)
RBC # FLD: 17.5 % — HIGH (ref 10.3–14.5)
SODIUM SERPL-SCNC: 133 MMOL/L — LOW (ref 135–145)
WBC # BLD: 7.8 K/UL — SIGNIFICANT CHANGE UP (ref 3.8–10.5)
WBC # FLD AUTO: 7.8 K/UL — SIGNIFICANT CHANGE UP (ref 3.8–10.5)

## 2019-07-16 PROCEDURE — 93010 ELECTROCARDIOGRAM REPORT: CPT

## 2019-07-16 PROCEDURE — 99233 SBSQ HOSP IP/OBS HIGH 50: CPT

## 2019-07-16 RX ORDER — FUROSEMIDE 40 MG
40 TABLET ORAL
Refills: 0 | Status: DISCONTINUED | OUTPATIENT
Start: 2019-07-16 | End: 2019-07-17

## 2019-07-16 RX ORDER — ISOSORBIDE DINITRATE 5 MG/1
10 TABLET ORAL THREE TIMES A DAY
Refills: 0 | Status: DISCONTINUED | OUTPATIENT
Start: 2019-07-16 | End: 2019-07-17

## 2019-07-16 RX ADMIN — CLOPIDOGREL BISULFATE 75 MILLIGRAM(S): 75 TABLET, FILM COATED ORAL at 11:41

## 2019-07-16 RX ADMIN — Medication 100 MILLIGRAM(S): at 05:45

## 2019-07-16 RX ADMIN — Medication 50 MICROGRAM(S): at 05:45

## 2019-07-16 RX ADMIN — Medication 50 MILLIGRAM(S): at 13:33

## 2019-07-16 RX ADMIN — LIDOCAINE 1 PATCH: 4 CREAM TOPICAL at 21:22

## 2019-07-16 RX ADMIN — ISOSORBIDE DINITRATE 10 MILLIGRAM(S): 5 TABLET ORAL at 13:33

## 2019-07-16 RX ADMIN — Medication 3 MILLIGRAM(S): at 21:23

## 2019-07-16 RX ADMIN — LIDOCAINE 1 PATCH: 4 CREAM TOPICAL at 09:44

## 2019-07-16 RX ADMIN — INSULIN GLARGINE 28 UNIT(S): 100 INJECTION, SOLUTION SUBCUTANEOUS at 21:22

## 2019-07-16 RX ADMIN — Medication 4: at 16:58

## 2019-07-16 RX ADMIN — ATORVASTATIN CALCIUM 40 MILLIGRAM(S): 80 TABLET, FILM COATED ORAL at 21:22

## 2019-07-16 RX ADMIN — MONTELUKAST 10 MILLIGRAM(S): 4 TABLET, CHEWABLE ORAL at 11:40

## 2019-07-16 RX ADMIN — Medication 2: at 21:22

## 2019-07-16 RX ADMIN — Medication 11 UNIT(S): at 08:19

## 2019-07-16 RX ADMIN — Medication 40 MILLIGRAM(S): at 05:44

## 2019-07-16 RX ADMIN — Medication 11 UNIT(S): at 12:04

## 2019-07-16 RX ADMIN — Medication 3: at 08:19

## 2019-07-16 RX ADMIN — SENNA PLUS 2 TABLET(S): 8.6 TABLET ORAL at 21:23

## 2019-07-16 RX ADMIN — LIDOCAINE 1 PATCH: 4 CREAM TOPICAL at 08:21

## 2019-07-16 RX ADMIN — Medication 40 MILLIGRAM(S): at 17:49

## 2019-07-16 RX ADMIN — Medication 50 MILLIGRAM(S): at 05:45

## 2019-07-16 RX ADMIN — Medication 50 MILLIGRAM(S): at 21:22

## 2019-07-16 RX ADMIN — Medication 5: at 12:04

## 2019-07-16 RX ADMIN — ISOSORBIDE DINITRATE 10 MILLIGRAM(S): 5 TABLET ORAL at 21:22

## 2019-07-16 RX ADMIN — Medication 100 MILLIGRAM(S): at 17:49

## 2019-07-16 RX ADMIN — Medication 11 UNIT(S): at 16:58

## 2019-07-16 RX ADMIN — PANTOPRAZOLE SODIUM 40 MILLIGRAM(S): 20 TABLET, DELAYED RELEASE ORAL at 05:45

## 2019-07-16 RX ADMIN — Medication 0.12 MILLIGRAM(S): at 05:44

## 2019-07-16 RX ADMIN — Medication 81 MILLIGRAM(S): at 11:41

## 2019-07-16 NOTE — PROGRESS NOTE ADULT - SUBJECTIVE AND OBJECTIVE BOX
[facility]OLOGY [facility]ACT[facility]   MD RAHEEL SHAH MD RUORU WONG, PA    TEL:  OFFICE: [phone]  DR SANTOYO CELL: [phone]  JUSTEN KENDALL CELL: [phone]  DR. NGUYEN CELL: [phone]    RENAL FOLLOW UP NOTE  --------------------------------------------------------------------------------  HPI:      Pt seen and examined at bedside.   +SOB, no chest pain     PAST HISTORY  --------------------------------------------------------------------------------  No significant changes to PMH, PSH, FHx, SHx, unless otherwise noted    ALLERGIES & MEDICATIONS  --------------------------------------------------------------------------------  Allergies    azithromycin (Hives; Pruritus)    Intolerances      Standing Inpatient Medications  aspirin enteric coated 81 milliGRAM(s) Oral daily  atorvastatin 40 milliGRAM(s) Oral at bedtime  chlorhexidine 2% Cloths 1 Application(s) Topical daily  clopidogrel Tablet 75 milliGRAM(s) Oral daily  dextrose 5%. 1000 milliLiter(s) IV Continuous <Continuous>  dextrose 50% Injectable 12.5 Gram(s) IV Push once  dextrose 50% Injectable 25 Gram(s) IV Push once  dextrose 50% Injectable 25 Gram(s) IV Push once  docusate sodium 100 milliGRAM(s) Oral two times a day  furosemide   Injectable 40 milliGRAM(s) IV Push two times a day  hydrALAZINE 50 milliGRAM(s) Oral every 8 hours  insulin glargine Injectable (LANTUS) 28 Unit(s) SubCutaneous at bedtime  insulin lispro (HumaLOG) corrective regimen sliding scale   SubCutaneous three times a day before meals  insulin lispro (HumaLOG) corrective regimen sliding scale   SubCutaneous at bedtime  insulin lispro Injectable (HumaLOG) 11 Unit(s) SubCutaneous three times a day before meals  isosorbide   dinitrate Tablet (ISORDIL) 10 milliGRAM(s) Oral three times a day  levothyroxine 50 MICROGram(s) Oral daily  lidocaine   Patch 1 Patch Transdermal daily  melatonin 3 milliGRAM(s) Oral at bedtime  montelukast 10 milliGRAM(s) Oral daily  pantoprazole    Tablet 40 milliGRAM(s) Oral before breakfast  polyethylene glycol 3350 17 Gram(s) Oral daily  senna 2 Tablet(s) Oral at bedtime    PRN Inpatient Medications  acetaminophen   Tablet .. 650 milliGRAM(s) Oral every 6 hours PRN  dextrose 40% Gel 15 Gram(s) Oral once PRN  glucagon  Injectable 1 milliGRAM(s) IntraMuscular once PRN      REVIEW OF SYSTEMS  --------------------------------------------------------------------------------  General: no fever  CVS: no chest pain  RESP: + sob, no cough  ABD: no abdominal pain  MSK: no edema     VITALS/PHYSICAL EXAM  --------------------------------------------------------------------------------  T(C): 36.8 (07-16-19 @ 12:12), Max: 37 (07-16-19 @ 04:37)  HR: 75 (07-16-19 @ 12:12) (70 - 75)  BP: 103/62 (07-16-19 @ 12:12) (103/62 - 118/60)  RR: 18 (07-16-19 @ 12:12) (18 - 18)  SpO2: 98% (07-16-19 @ 12:12) (98% - 100%)  Wt(kg): --        07-15-19 @ 07:01  -  07-16-19 @ 07:00  --------------------------------------------------------  IN: 660 mL / OUT: 1090 mL / NET: -430 mL    07-16-19 @ 07:01  -  07-16-19 @ 13:17  --------------------------------------------------------  IN: 0 mL / OUT: 400 mL / NET: -400 mL      Physical Exam:  	Gen: NAD  	HEENT: MMM  	Pulm: Crackles B/L  	CV: S1S2  	Abd: Soft, +BS  	Ext: No LE edema B/L                      Neuro: Awake   	Skin: Warm and Dry   	 no polo    LABS/STUDIES  --------------------------------------------------------------------------------              8.1    7.8   >-----------<  291      [07-16-19 @ 06:49]              24.2     133  |  94  |  71  ----------------------------<  273      [07-16-19 @ 06:49]  4.6   |  20  |  2.31        Ca     9.3     [07-16-19 @ 06:49]      Mg     2.3     [07-16-19 @ 06:49]      Phos  4.1     [07-16-19 @ 06:49]    TPro  7.4  /  Alb  3.5  /  TBili  0.2  /  DBili  x   /  AST  30  /  ALT  131  /  AlkPhos  133  [07-16-19 @ 06:49]          Creatinine Trend:  SCr 2.31 [07-16 @ 06:49]  SCr 2.45 [07-15 @ 06:10]  SCr 2.59 [07-14 @ 06:45]  SCr 2.72 [07-13 @ 07:09]  SCr 2.72 [07-13 @ 07:01]    Urinalysis - [07-09-19 @ 13:28]      Color Yellow / Appearance Clear / SG 1.012 / pH 6.0      Gluc Negative / Ketone Negative  / Bili Negative / Urobili Negative       Blood Trace / Protein Trace / Leuk Est Negative / Nitrite Negative      RBC 1 / WBC 1 / Hyaline 0 / Gran  / Sq Epi  / Non Sq Epi 0 / Bacteria Moderate      Iron 42, TIBC 348, %sat 12      [07-05-19 @ 22:32]  Ferritin 130      [07-05-19 @ 22:32]  .4 (Ca --)      [04-25-19 @ 05:45]   --  PTH 43.55 (Ca --)      [09-10-18 @ 07:15]   --  PTH 36.60 (Ca --)      [09-08-18 @ 03:15]   --  Vitamin D (25OH) 25.9      [05-01-19 @ 04:00]  HbA1c 7.4      [07-05-19 @ 22:32]  TSH 2.00      [07-05-19 @ 22:32]  Lipid: chol 148, , HDL 35,       [06-01-19 @ 08:00]

## 2019-07-16 NOTE — PROGRESS NOTE ADULT - SUBJECTIVE AND OBJECTIVE BOX
CHIEF COMPLAINT:    SUBJECTIVE:     REVIEW OF SYSTEMS:    CONSTITUTIONAL: (  )  weakness,  (  ) fevers or chills  EYES/ENT: (  )visual changes;     NECK: (  ) pain or stiffness  RESPIRATORY:   (  )cough, wheezing, hemoptysis;  (  ) shortness of breath  CARDIOVASCULAR:  (  )chest pain or palpitations  GASTROINTESTINAL:   (  )abdominal or epigastric pain.  (  ) nausea, vomiting, or hematemesis;   (   ) diarrhea or constipation.   GENITOURINARY:   (    ) dysuria, frequency or hematuria  NEUROLOGICAL:  (   ) numbness or weakness   All other review of systems is negative unless indicated above    Vital Signs Last 24 Hrs  T(C): 37 (16 Jul 2019 04:37), Max: 37 (16 Jul 2019 04:37)  T(F): 98.6 (16 Jul 2019 04:37), Max: 98.6 (16 Jul 2019 04:37)  HR: 70 (16 Jul 2019 04:37) (70 - 74)  BP: 111/59 (16 Jul 2019 04:37) (105/52 - 118/60)  BP(mean): --  RR: 18 (16 Jul 2019 04:37) (18 - 18)  SpO2: 100% (16 Jul 2019 04:37) (96% - 100%)    I&O's Summary    15 Jul 2019 07:01  -  16 Jul 2019 07:00  --------------------------------------------------------  IN: 660 mL / OUT: 1090 mL / NET: -430 mL        CAPILLARY BLOOD GLUCOSE      POCT Blood Glucose.: 275 mg/dL (16 Jul 2019 07:35)  POCT Blood Glucose.: 272 mg/dL (15 Jul 2019 21:12)  POCT Blood Glucose.: 240 mg/dL (15 Jul 2019 16:40)  POCT Blood Glucose.: 454 mg/dL (15 Jul 2019 11:42)  POCT Blood Glucose.: 454 mg/dL (15 Jul 2019 11:41)      PHYSICAL EXAM:    Constitutional:  (   ) NAD,   (   )awake and alert  HEENT: PERR, EOMI,    Neck: Soft and supple, No LAD, No JVD  Respiratory:  (    Breath sounds are clear bilaterally,    (   ) wheezing, rales or rhonchi  Cardiovascular:     (   )S1 and S2, regular rate and rhythm, no Murmurs, gallops or rubs  Gastrointestinal:  (   )Bowel Sounds present, soft,   (  )nontender, nondistended,    Extremities:    (  ) peripheral edema  Vascular: 2+ peripheral pulses  Neurological:    (    )A/O x 3,   (  ) focal deficits  Musculoskeletal:    (   )  normal strength b/l upper  (     ) normal  lower extremities  Skin: No rashes    MEDICATIONS:  MEDICATIONS  (STANDING):  aspirin enteric coated 81 milliGRAM(s) Oral daily  atorvastatin 40 milliGRAM(s) Oral at bedtime  chlorhexidine 2% Cloths 1 Application(s) Topical daily  clopidogrel Tablet 75 milliGRAM(s) Oral daily  dextrose 5%. 1000 milliLiter(s) (50 mL/Hr) IV Continuous <Continuous>  dextrose 50% Injectable 12.5 Gram(s) IV Push once  dextrose 50% Injectable 25 Gram(s) IV Push once  dextrose 50% Injectable 25 Gram(s) IV Push once  docusate sodium 100 milliGRAM(s) Oral two times a day  furosemide   Injectable 40 milliGRAM(s) IV Push daily  hydrALAZINE 50 milliGRAM(s) Oral every 8 hours  insulin glargine Injectable (LANTUS) 28 Unit(s) SubCutaneous at bedtime  insulin lispro (HumaLOG) corrective regimen sliding scale   SubCutaneous three times a day before meals  insulin lispro (HumaLOG) corrective regimen sliding scale   SubCutaneous at bedtime  insulin lispro Injectable (HumaLOG) 11 Unit(s) SubCutaneous three times a day before meals  levothyroxine 50 MICROGram(s) Oral daily  lidocaine   Patch 1 Patch Transdermal daily  melatonin 3 milliGRAM(s) Oral at bedtime  montelukast 10 milliGRAM(s) Oral daily  pantoprazole    Tablet 40 milliGRAM(s) Oral before breakfast  polyethylene glycol 3350 17 Gram(s) Oral daily  senna 2 Tablet(s) Oral at bedtime      LABS: All Labs Reviewed:                        8.1    7.8   )-----------( 291      ( 16 Jul 2019 06:49 )             24.2     07-16    133<L>  |  94<L>  |  71<H>  ----------------------------<  273<H>  4.6   |  20<L>  |  2.31<H>    Ca    9.3      16 Jul 2019 06:49  Phos  4.1     07-16  Mg     2.3     07-16    TPro  7.4  /  Alb  3.5  /  TBili  0.2  /  DBili  x   /  AST  30  /  ALT  131<H>  /  AlkPhos  133<H>  07-16          Blood Culture:   Urine Culture      RADIOLOGY/EKG:    ASSESSMENT AND PLAN:    DVT PPX:    ADVANCED DIRECTIVE:    DISPOSITION: CHIEF COMPLAINT: No event overnight  Pt is a 74 yo woman with PMHx of HTN, T2DM (A1c 7.4%), CAD s/p CABG (LIMA to LAD, SVG to OM, SVG to PDA 2014 at [facility]), non-dilated ICM (EF 20-25%), severe mitral regurgitation s/p mitral clip (6/13/19 Dr. Newman), severe pulm HTN, CKD (b/l Cr 1.8-2), hypothyroidism, who is presenting to ED after experiencing worsening SOB and intermittent chest pain for 1 week at [facility]. Of note, pt was recently discharged on 6/19 after having mitral clip procedure completed. However, she was never noted to be dyspneic in rehab until 7/5/2019. Reportedly she was feeling well upon discharge, however, she gradually developed worsening exertional dyspnea. Upon presentation, no fevers, chills, nausea, vomiting, cough, sputum production, chest tightness, pressure, palpitations, LOC, syncope.  SUBJECTIVE:     REVIEW OF SYSTEMS:    CONSTITUTIONAL: ( x )  weakness,  (  ) fevers or chills  EYES/ENT: (  )visual changes;     NECK: (  ) pain or stiffness  RESPIRATORY:   (  )cough, wheezing, hemoptysis;  (  ) shortness of breath  CARDIOVASCULAR:  (  )chest pain or palpitations  GASTROINTESTINAL:   (  )abdominal or epigastric pain.  (  ) nausea, vomiting, or hematemesis;   (   ) diarrhea or constipation.   GENITOURINARY:   (    ) dysuria, frequency or hematuria  NEUROLOGICAL:  (   ) numbness or weakness   All other review of systems is negative unless indicated above    Vital Signs Last 24 Hrs  T(C): 37 (16 Jul 2019 04:37), Max: 37 (16 Jul 2019 04:37)  T(F): 98.6 (16 Jul 2019 04:37), Max: 98.6 (16 Jul 2019 04:37)  HR: 70 (16 Jul 2019 04:37) (70 - 74)  BP: 111/59 (16 Jul 2019 04:37) (105/52 - 118/60)  BP(mean): --  RR: 18 (16 Jul 2019 04:37) (18 - 18)  SpO2: 100% (16 Jul 2019 04:37) (96% - 100%)    I&O's Summary    15 Jul 2019 07:01  -  16 Jul 2019 07:00  --------------------------------------------------------  IN: 660 mL / OUT: 1090 mL / NET: -430 mL        CAPILLARY BLOOD GLUCOSE      POCT Blood Glucose.: 275 mg/dL (16 Jul 2019 07:35)  POCT Blood Glucose.: 272 mg/dL (15 Jul 2019 21:12)  POCT Blood Glucose.: 240 mg/dL (15 Jul 2019 16:40)  POCT Blood Glucose.: 454 mg/dL (15 Jul 2019 11:42)  POCT Blood Glucose.: 454 mg/dL (15 Jul 2019 11:41)      PHYSICAL EXAM:     General: No acute distress,      Eyes: Conjunctiva clear, PERRLA    Neck: Neck supple, JVP 8-10 cm H2O    Respiratory: Conversational dyspnea, Right lower lobe crackles     Cardiovascular: RRR, S1S2, no murmurs, rubs or gallops, no lower extremity edema    Abdomin: Soft, distended non tender, normoactive bowel sounds     Neurological: Alert and oriented x 3    MEDICATIONS:  MEDICATIONS  (STANDING):  aspirin enteric coated 81 milliGRAM(s) Oral daily  atorvastatin 40 milliGRAM(s) Oral at bedtime  chlorhexidine 2% Cloths 1 Application(s) Topical daily  clopidogrel Tablet 75 milliGRAM(s) Oral daily  dextrose 5%. 1000 milliLiter(s) (50 mL/Hr) IV Continuous <Continuous>  dextrose 50% Injectable 12.5 Gram(s) IV Push once  dextrose 50% Injectable 25 Gram(s) IV Push once  dextrose 50% Injectable 25 Gram(s) IV Push once  docusate sodium 100 milliGRAM(s) Oral two times a day  furosemide   Injectable 40 milliGRAM(s) IV Push daily  hydrALAZINE 50 milliGRAM(s) Oral every 8 hours  insulin glargine Injectable (LANTUS) 28 Unit(s) SubCutaneous at bedtime  insulin lispro (HumaLOG) corrective regimen sliding scale   SubCutaneous three times a day before meals  insulin lispro (HumaLOG) corrective regimen sliding scale   SubCutaneous at bedtime  insulin lispro Injectable (HumaLOG) 11 Unit(s) SubCutaneous three times a day before meals  levothyroxine 50 MICROGram(s) Oral daily  lidocaine   Patch 1 Patch Transdermal daily  melatonin 3 milliGRAM(s) Oral at bedtime  montelukast 10 milliGRAM(s) Oral daily  pantoprazole    Tablet 40 milliGRAM(s) Oral before breakfast  polyethylene glycol 3350 17 Gram(s) Oral daily  senna 2 Tablet(s) Oral at bedtime      LABS: All Labs Reviewed:                        8.1    7.8   )-----------( 291      ( 16 Jul 2019 06:49 )             24.2         133<L>  |  94<L>  |  71<H>  ----------------------------<  273<H>  4.6   |  20<L>  |  2.31<H>    Ca    9.3      16 Jul 2019 06:49  Phos  4.1     07-16  Mg     2.3     07-16    TPro  7.4  /  Alb  3.5  /  TBili  0.2  /  DBili  x   /  AST  30  /  ALT  131<H>  /  AlkPhos  133<H>  07-16          Blood Culture:   Urine Culture      RADIOLOGY/EKG:    ASSESSMENT AND PLAN:  Pt is a 74 yo woman with PMHx of HTN, T2DM (A1c 7.4%), CAD s/p CABG (LIMA to LAD, SVG to OM, SVG to PDA 2014 at [facility]), non-dilated ICM (EF 20-25%), severe mitral regurgitation s/p mitral clip (6/13/19 Dr. Newman), severe pulm HTN, CKD, hypothyroidism, who is presenting to ED after [facility] and [facility] Of note, pt was recently discharged on 6/19 after having mitral clip procedure completed. She initially required BiPAP with improvement in her respiratory status following diuresis. Initiated on vasopressors and inotropes in CCU, which were subsequently weaned off. Infectious work up was negative.    She currently mildly volume up on exam. NYHA Class IIIb symptoms. She is down about 4 kg since admission. Her Scrt is currently elevated above her baseline, however gradually downtrending over the past few days off lasix gtt. She is normotensive.     Problem/Recommendation - 1:  Problem: Acute on chronic systolic heart failure. Recommendation: - Continue current dose of diuretics  - Continue hydralazine 50mg PO TID, hold for SBP < 90  - No ARB/ARNI/MRA at this time due to renal dysfunction  - Daily standing weights, strict I &Os.    Problem/Recommendation - 2:  ·  Problem: Severe mitral regurgitation.  Recommendation: - s/p Mitral clip   - TTE on 7/9: mild MR.     Problem/Recommendation - 3:  ·  Problem: CAD (coronary artery disease).  Recommendation: -Continue ASA, clopidogrel.   Problem/Recommendation - 4:  ·  Problem: SVT (supraventricular tachycardia).  Recommendation: - Discontinue digoxin as level was elevated      Problem/Recommendation - 5:  ·  Problem: Acute renal failure.  Recommendation: -  Continue recommendation as per renal  - Avoid nephrotoxins.  DVT PPX:    ADVANCED DIRECTIVE:    DISPOSITION: discussed with medical team and discharge planning patient wishes is to go home but I do not think so she needs to go to rehab

## 2019-07-16 NOTE — PROGRESS NOTE ADULT - ASSESSMENT
Pt is a 72 yo woman with PMHx of HTN, T2DM, CAD s/p CABG (LIMA to LAD, SVG to OM, SVG to PDA 2014 at [facility]), non-dilated ICM (EF 20-25%), severe mitral regurgitation s/p mitral clip (6/13/19 Dr. Newman), severe pulm HTN, CKD, hypothyroidism, who is presenting to ED after experiencing worsening SOB      A/P:      MERVIN  MERVIN sec to cardiogenic shock  Renal function improving  PT non -oliguric  On lasix 40mg IV BID  Optimize glucose control  Monitor renal function   Avoid further nephrotoxics, NSAIDS RCA    CKD stage 4:  baseline Scr 1.8-2.0  Renal function fluctuates sec to CHF  Monitor renal function at present    SOB:  in setting of HF  COntinue diuretics per cardiology    ACidosis   sec to RF  mOnitor serum Co2 at present

## 2019-07-16 NOTE — PROGRESS NOTE ADULT - SUBJECTIVE AND OBJECTIVE BOX
Subjective:  - She is endorsing fatigue, weakness and poor appetite    - She is able to walk to the chair without increased dyspnea   - Denies lightheadedness, dizziness, palpitations, orthopnea, PND and lower extremity bloating       Medications:  acetaminophen   Tablet .. 650 milliGRAM(s) Oral every 6 hours PRN  aspirin enteric coated 81 milliGRAM(s) Oral daily  atorvastatin 40 milliGRAM(s) Oral at bedtime  chlorhexidine 2% Cloths 1 Application(s) Topical daily  clopidogrel Tablet 75 milliGRAM(s) Oral daily  dextrose 40% Gel 15 Gram(s) Oral once PRN  dextrose 5%. 1000 milliLiter(s) IV Continuous <Continuous>  dextrose 50% Injectable 12.5 Gram(s) IV Push once  dextrose 50% Injectable 25 Gram(s) IV Push once  dextrose 50% Injectable 25 Gram(s) IV Push once  docusate sodium 100 milliGRAM(s) Oral two times a day  furosemide   Injectable 40 milliGRAM(s) IV Push daily  glucagon  Injectable 1 milliGRAM(s) IntraMuscular once PRN  hydrALAZINE 50 milliGRAM(s) Oral every 8 hours  insulin glargine Injectable (LANTUS) 28 Unit(s) SubCutaneous at bedtime  insulin lispro (HumaLOG) corrective regimen sliding scale   SubCutaneous three times a day before meals  insulin lispro (HumaLOG) corrective regimen sliding scale   SubCutaneous at bedtime  insulin lispro Injectable (HumaLOG) 11 Unit(s) SubCutaneous three times a day before meals  levothyroxine 50 MICROGram(s) Oral daily  lidocaine   Patch 1 Patch Transdermal daily  melatonin 3 milliGRAM(s) Oral at bedtime  montelukast 10 milliGRAM(s) Oral daily  pantoprazole    Tablet 40 milliGRAM(s) Oral before breakfast  polyethylene glycol 3350 17 Gram(s) Oral daily  senna 2 Tablet(s) Oral at bedtime      Physical Exam:    Vitals:  Vital Signs Last 24 Hours  T(C): 37 (19 @ 04:37), Max: 37 (19 @ 04:37)  HR: 70 (19 @ 04:37) (70 - 74)  BP: 111/59 (19 @ 04:37) (105/52 - 118/60)  RR: 18 (19 @ 04:37) (18 - 18)  SpO2: 100% (19 @ 04:37) (96% - 100%)    Weight in k.3 ( @ 09:25)    I&O's Summary    15 Jul 2019 07:01  -  2019 07:00  --------------------------------------------------------  IN: 660 mL / OUT: 1090 mL / NET: -430 mL    Tele: SR 1st degree 60-70    General: No distress. Comfortable.  HEENT: EOM intact.  Neck: Neck supple. JVP 10-12 elevated. No masses  Chest: BL posterior lower lobe rales  CV: RRR, Normal S1 and S2. No murmurs, rub, or gallops. Radial pulses normal. No LE edema  Abdomen: Soft, mildly distended, non-tender  Skin: No rashes or skin breakdown  Neurology: Alert and oriented times three. Sensation intact  Psych: Affect normal    Labs:                        8.1    7.8   )-----------( 291      ( 2019 06:49 )             24.2         133<L>  |  94<L>  |  71<H>  ----------------------------<  273<H>  4.6   |  20<L>  |  2.31<H>    Ca    9.3      2019 06:49  Phos  4.1       Mg     2.3         TPro  7.4  /  Alb  3.5  /  TBili  0.2  /  DBili  x   /  AST  30  /  ALT  131<H>  /  AlkPhos  133<H>      Digoxin level : 2.8     Lactate, Blood: 0.8 mmol/L (07-15 @ 07:00)  Lactate, Blood: 1.3 mmol/L ( @ 06:45)

## 2019-07-16 NOTE — PROGRESS NOTE ADULT - PROBLEM SELECTOR PLAN 4
- Discontinue digoxin as level was critically high today at 2.8.  - Continue to monitor on tele - Discontinue digoxin as level was critically high today at 2.8.  - 12 lead EKG today  - Continue to monitor on tele

## 2019-07-16 NOTE — PROGRESS NOTE ADULT - ASSESSMENT
Pt is a 74 yo woman with PMHx of HTN, T2DM (A1c 7.4%), CAD s/p CABG (LIMA to LAD, SVG to OM, SVG to PDA 2014 at [facility]), non-dilated ICM (EF 20-25%), severe mitral regurgitation s/p mitral clip (6/13/19 Dr. Newman), severe pulm HTN, CKD, hypothyroidism, who is presenting to ED after experiencing [facility] and [facility] Of note, pt was recently discharged on 6/19 after having mitral clip procedure completed. She initially required BiPAP with improvement in her respiratory status following diuresis. Initiated on vasopressors and inotropes in CCU, which were subsequently weaned off. Infectious work up was negative.    She currently mildly volume up on exam with NYHA Class IIIb symptoms. She is not adequately responding to current diuretic regimen. Her weight is up ~1 kg and her UOP is only documented to be about 1 liter. Her Scrt is currently elevated above her baseline, however gradually downtrending over the past few days off lasix gtt. She is normotensive.

## 2019-07-16 NOTE — PROGRESS NOTE ADULT - ATTENDING COMMENTS
She remains volume overloaded with inadequate urine output over the last 24h.  Increase lasix to 40 mg IV BID and add ISDN 10 mg po TID, please (hold SBP < 90).  Continue hydralazine 50 mg po TID, hold SBP < 90.  Hgb stable.

## 2019-07-16 NOTE — PROGRESS NOTE ADULT - PROBLEM SELECTOR PLAN 5
- SCr 2.31 today but downtrending, baseline has been 1.8-2  - Diuresis as above  - Avoid nephrotoxins.

## 2019-07-16 NOTE — PROGRESS NOTE ADULT - PROBLEM SELECTOR PLAN 1
- Consider starting ISDN 10 mg PO TID, hold for SBP < 90  - Consider increasing lasix to 40 mg IV BID  - Continue hydralazine 50mg PO TID, hold for SBP < 90  - No ARB/ARNI/MRA at this time due to renal dysfunction  - Daily standing weights, strict I &Os. - Please start ISDN 10 mg PO TID, hold for SBP < 90  - Please increase lasix to 40 mg IV BID  - Continue hydralazine 50mg PO TID, hold for SBP < 90  - No ARB/ARNI/MRA at this time due to renal dysfunction  - Will hold on initiation of beta block until euvolemic  - Daily standing weights, strict I &Os.  - Please check room air and ambulatory O2 Sat to evaluate need for O2

## 2019-07-17 DIAGNOSIS — Z51.5 ENCOUNTER FOR PALLIATIVE CARE: ICD-10-CM

## 2019-07-17 DIAGNOSIS — N17.9 ACUTE KIDNEY FAILURE, UNSPECIFIED: ICD-10-CM

## 2019-07-17 DIAGNOSIS — R53.83 OTHER FATIGUE: ICD-10-CM

## 2019-07-17 DIAGNOSIS — R53.81 OTHER MALAISE: ICD-10-CM

## 2019-07-17 LAB
ANION GAP SERPL CALC-SCNC: 14 MMOL/L — SIGNIFICANT CHANGE UP (ref 5–17)
BUN SERPL-MCNC: 70 MG/DL — HIGH (ref 7–23)
CALCIUM SERPL-MCNC: 9.3 MG/DL — SIGNIFICANT CHANGE UP (ref 8.4–10.5)
CHLORIDE SERPL-SCNC: 98 MMOL/L — SIGNIFICANT CHANGE UP (ref 96–108)
CO2 SERPL-SCNC: 21 MMOL/L — LOW (ref 22–31)
CREAT SERPL-MCNC: 2.02 MG/DL — HIGH (ref 0.5–1.3)
GLUCOSE BLDC GLUCOMTR-MCNC: 282 MG/DL — HIGH (ref 70–99)
GLUCOSE BLDC GLUCOMTR-MCNC: 326 MG/DL — HIGH (ref 70–99)
GLUCOSE BLDC GLUCOMTR-MCNC: 340 MG/DL — HIGH (ref 70–99)
GLUCOSE BLDC GLUCOMTR-MCNC: 360 MG/DL — HIGH (ref 70–99)
GLUCOSE SERPL-MCNC: 286 MG/DL — HIGH (ref 70–99)
HCT VFR BLD CALC: 24.3 % — LOW (ref 34.5–45)
HGB BLD-MCNC: 7.5 G/DL — LOW (ref 11.5–15.5)
MCHC RBC-ENTMCNC: 27.5 PG — SIGNIFICANT CHANGE UP (ref 27–34)
MCHC RBC-ENTMCNC: 30.9 GM/DL — LOW (ref 32–36)
MCV RBC AUTO: 89 FL — SIGNIFICANT CHANGE UP (ref 80–100)
PLATELET # BLD AUTO: 284 K/UL — SIGNIFICANT CHANGE UP (ref 150–400)
POTASSIUM SERPL-MCNC: 4.7 MMOL/L — SIGNIFICANT CHANGE UP (ref 3.5–5.3)
POTASSIUM SERPL-SCNC: 4.7 MMOL/L — SIGNIFICANT CHANGE UP (ref 3.5–5.3)
RBC # BLD: 2.73 M/UL — LOW (ref 3.8–5.2)
RBC # FLD: 17.3 % — HIGH (ref 10.3–14.5)
SODIUM SERPL-SCNC: 133 MMOL/L — LOW (ref 135–145)
WBC # BLD: 8.22 K/UL — SIGNIFICANT CHANGE UP (ref 3.8–10.5)
WBC # FLD AUTO: 8.22 K/UL — SIGNIFICANT CHANGE UP (ref 3.8–10.5)

## 2019-07-17 PROCEDURE — 93010 ELECTROCARDIOGRAM REPORT: CPT

## 2019-07-17 PROCEDURE — 99223 1ST HOSP IP/OBS HIGH 75: CPT

## 2019-07-17 PROCEDURE — 99233 SBSQ HOSP IP/OBS HIGH 50: CPT

## 2019-07-17 RX ORDER — FUROSEMIDE 40 MG
60 TABLET ORAL
Refills: 0 | Status: DISCONTINUED | OUTPATIENT
Start: 2019-07-17 | End: 2019-07-30

## 2019-07-17 RX ORDER — ISOSORBIDE DINITRATE 5 MG/1
20 TABLET ORAL THREE TIMES A DAY
Refills: 0 | Status: DISCONTINUED | OUTPATIENT
Start: 2019-07-17 | End: 2019-07-19

## 2019-07-17 RX ADMIN — Medication 50 MILLIGRAM(S): at 05:15

## 2019-07-17 RX ADMIN — Medication 50 MILLIGRAM(S): at 21:40

## 2019-07-17 RX ADMIN — INSULIN GLARGINE 28 UNIT(S): 100 INJECTION, SOLUTION SUBCUTANEOUS at 21:39

## 2019-07-17 RX ADMIN — Medication 11 UNIT(S): at 12:28

## 2019-07-17 RX ADMIN — Medication 100 MILLIGRAM(S): at 05:15

## 2019-07-17 RX ADMIN — Medication 50 MILLIGRAM(S): at 14:57

## 2019-07-17 RX ADMIN — Medication 100 MILLIGRAM(S): at 17:57

## 2019-07-17 RX ADMIN — Medication 3: at 17:33

## 2019-07-17 RX ADMIN — Medication 81 MILLIGRAM(S): at 11:14

## 2019-07-17 RX ADMIN — ISOSORBIDE DINITRATE 20 MILLIGRAM(S): 5 TABLET ORAL at 22:32

## 2019-07-17 RX ADMIN — LIDOCAINE 1 PATCH: 4 CREAM TOPICAL at 10:13

## 2019-07-17 RX ADMIN — Medication 3: at 21:40

## 2019-07-17 RX ADMIN — Medication 50 MICROGRAM(S): at 05:15

## 2019-07-17 RX ADMIN — ATORVASTATIN CALCIUM 40 MILLIGRAM(S): 80 TABLET, FILM COATED ORAL at 21:40

## 2019-07-17 RX ADMIN — Medication 3 MILLIGRAM(S): at 21:41

## 2019-07-17 RX ADMIN — LIDOCAINE 1 PATCH: 4 CREAM TOPICAL at 08:20

## 2019-07-17 RX ADMIN — SENNA PLUS 2 TABLET(S): 8.6 TABLET ORAL at 21:41

## 2019-07-17 RX ADMIN — Medication 4: at 12:27

## 2019-07-17 RX ADMIN — MONTELUKAST 10 MILLIGRAM(S): 4 TABLET, CHEWABLE ORAL at 11:14

## 2019-07-17 RX ADMIN — Medication 4: at 08:16

## 2019-07-17 RX ADMIN — CLOPIDOGREL BISULFATE 75 MILLIGRAM(S): 75 TABLET, FILM COATED ORAL at 11:14

## 2019-07-17 RX ADMIN — LIDOCAINE 1 PATCH: 4 CREAM TOPICAL at 21:40

## 2019-07-17 RX ADMIN — ISOSORBIDE DINITRATE 10 MILLIGRAM(S): 5 TABLET ORAL at 05:15

## 2019-07-17 RX ADMIN — Medication 60 MILLIGRAM(S): at 17:59

## 2019-07-17 RX ADMIN — Medication 11 UNIT(S): at 08:16

## 2019-07-17 RX ADMIN — Medication 11 UNIT(S): at 17:33

## 2019-07-17 RX ADMIN — Medication 40 MILLIGRAM(S): at 05:15

## 2019-07-17 RX ADMIN — PANTOPRAZOLE SODIUM 40 MILLIGRAM(S): 20 TABLET, DELAYED RELEASE ORAL at 05:15

## 2019-07-17 NOTE — PROGRESS NOTE ADULT - ASSESSMENT
Pt is a 74 yo woman with PMHx of HTN, T2DM (A1c 7.4%), CAD s/p CABG (LIMA to LAD, SVG to OM, SVG to PDA 2014 at [facility]), non-dilated ICM (EF 20-25%), severe mitral regurgitation s/p mitral clip (6/13/19 Dr. Newman), severe pulm HTN, CKD, hypothyroidism, who is presenting to ED after [facility] Of note, pt was recently discharged on 6/19 after having mitral clip procedure completed. She initially required BiPAP with improvement in her respiratory status following diuresis. Initiated on vasopressors and inotropes in CCU, which were subsequently weaned off. Infectious work up was negative.    She remains volume overloaded with NYHA Class IIIb symptoms. She is not adequately responding to current diuretic regimen. Her weight is unchanged today despite increase in diuretics. Her Scrt is continuing to downtrend and now close to baseline. She is normotensive tolerating uptitration of vasodilators.

## 2019-07-17 NOTE — PROGRESS NOTE ADULT - SUBJECTIVE AND OBJECTIVE BOX
CHIEF COMPLAINT:    SUBJECTIVE:     REVIEW OF SYSTEMS:    CONSTITUTIONAL: (  )  weakness,  (  ) fevers or chills  EYES/ENT: (  )visual changes;     NECK: (  ) pain or stiffness  RESPIRATORY:   (  )cough, wheezing, hemoptysis;  (  ) shortness of breath  CARDIOVASCULAR:  (  )chest pain or palpitations  GASTROINTESTINAL:   (  )abdominal or epigastric pain.  (  ) nausea, vomiting, or hematemesis;   (   ) diarrhea or constipation.   GENITOURINARY:   (    ) dysuria, frequency or hematuria  NEUROLOGICAL:  (   ) numbness or weakness   All other review of systems is negative unless indicated above    Vital Signs Last 24 Hrs  T(C): 37.1 (17 Jul 2019 04:26), Max: 37.2 (16 Jul 2019 20:38)  T(F): 98.8 (17 Jul 2019 04:26), Max: 98.9 (16 Jul 2019 20:38)  HR: 67 (17 Jul 2019 04:26) (67 - 75)  BP: 109/61 (17 Jul 2019 04:26) (100/56 - 109/61)  BP(mean): --  RR: 18 (17 Jul 2019 04:26) (18 - 19)  SpO2: 100% (17 Jul 2019 04:26) (98% - 100%)    I&O's Summary    16 Jul 2019 07:01  -  17 Jul 2019 07:00  --------------------------------------------------------  IN: 120 mL / OUT: 1500 mL / NET: -1380 mL    17 Jul 2019 07:01  -  17 Jul 2019 11:28  --------------------------------------------------------  IN: 240 mL / OUT: 400 mL / NET: -160 mL        CAPILLARY BLOOD GLUCOSE      POCT Blood Glucose.: 340 mg/dL (17 Jul 2019 07:48)  POCT Blood Glucose.: 302 mg/dL (16 Jul 2019 21:07)  POCT Blood Glucose.: 317 mg/dL (16 Jul 2019 16:33)  POCT Blood Glucose.: 366 mg/dL (16 Jul 2019 11:41)      PHYSICAL EXAM:    Constitutional:  (   ) NAD,   (   )awake and alert  HEENT: PERR, EOMI,    Neck: Soft and supple, No LAD, No JVD  Respiratory:  (    Breath sounds are clear bilaterally,    (   ) wheezing, rales or rhonchi  Cardiovascular:     (   )S1 and S2, regular rate and rhythm, no Murmurs, gallops or rubs  Gastrointestinal:  (   )Bowel Sounds present, soft,   (  )nontender, nondistended,    Extremities:    (  ) peripheral edema  Vascular: 2+ peripheral pulses  Neurological:    (    )A/O x 3,   (  ) focal deficits  Musculoskeletal:    (   )  normal strength b/l upper  (     ) normal  lower extremities  Skin: No rashes    MEDICATIONS:  MEDICATIONS  (STANDING):  aspirin enteric coated 81 milliGRAM(s) Oral daily  atorvastatin 40 milliGRAM(s) Oral at bedtime  chlorhexidine 2% Cloths 1 Application(s) Topical daily  clopidogrel Tablet 75 milliGRAM(s) Oral daily  dextrose 5%. 1000 milliLiter(s) (50 mL/Hr) IV Continuous <Continuous>  dextrose 50% Injectable 12.5 Gram(s) IV Push once  dextrose 50% Injectable 25 Gram(s) IV Push once  dextrose 50% Injectable 25 Gram(s) IV Push once  docusate sodium 100 milliGRAM(s) Oral two times a day  furosemide   Injectable 40 milliGRAM(s) IV Push two times a day  hydrALAZINE 50 milliGRAM(s) Oral every 8 hours  insulin glargine Injectable (LANTUS) 28 Unit(s) SubCutaneous at bedtime  insulin lispro (HumaLOG) corrective regimen sliding scale   SubCutaneous three times a day before meals  insulin lispro (HumaLOG) corrective regimen sliding scale   SubCutaneous at bedtime  insulin lispro Injectable (HumaLOG) 11 Unit(s) SubCutaneous three times a day before meals  isosorbide   dinitrate Tablet (ISORDIL) 10 milliGRAM(s) Oral three times a day  levothyroxine 50 MICROGram(s) Oral daily  lidocaine   Patch 1 Patch Transdermal daily  melatonin 3 milliGRAM(s) Oral at bedtime  montelukast 10 milliGRAM(s) Oral daily  pantoprazole    Tablet 40 milliGRAM(s) Oral before breakfast  polyethylene glycol 3350 17 Gram(s) Oral daily  senna 2 Tablet(s) Oral at bedtime      LABS: All Labs Reviewed:                        7.5    8.22  )-----------( 284      ( 17 Jul 2019 09:19 )             24.3     07-17    133<L>  |  98  |  70<H>  ----------------------------<  286<H>  4.7   |  21<L>  |  2.02<H>    Ca    9.3      17 Jul 2019 06:35  Phos  4.1     07-16  Mg     2.3     07-16    TPro  7.4  /  Alb  3.5  /  TBili  0.2  /  DBili  x   /  AST  30  /  ALT  131<H>  /  AlkPhos  133<H>  07-16          Blood Culture:   Urine Culture      RADIOLOGY/EKG:    ASSESSMENT AND PLAN:    DVT PPX:    ADVANCED DIRECTIVE:    DISPOSITION: CHIEF COMPLAINT: SOB   - Reports not feeling well today. Continues to reports generalized weakness and fatigue.  - Notes orthopnea. Has not been OOB yet today. Denies SOB at rest, CP, palpitations, lightheadedness, dizziness  SUBJECTIVE:     REVIEW OF SYSTEMS:    CONSTITUTIONAL: (x  )  weakness,  (  ) fevers or chills  EYES/ENT: (  )visual changes;     NECK: (  ) pain or stiffness  RESPIRATORY:   (  )cough, wheezing, hemoptysis;  (  ) shortness of breath  CARDIOVASCULAR:  (  )chest pain or palpitations  GASTROINTESTINAL:   (x  )abdominal or epigastric pain.  (  ) nausea, vomiting, or hematemesis;   (   ) diarrhea or constipation.   GENITOURINARY:   (    ) dysuria, frequency or hematuria  NEUROLOGICAL:  (   ) numbness or weakness   All other review of systems is negative unless indicated above    Vital Signs Last 24 Hrs  T(C): 37.1 (17 Jul 2019 04:26), Max: 37.2 (16 Jul 2019 20:38)  T(F): 98.8 (17 Jul 2019 04:26), Max: 98.9 (16 Jul 2019 20:38)  HR: 67 (17 Jul 2019 04:26) (67 - 75)  BP: 109/61 (17 Jul 2019 04:26) (100/56 - 109/61)  BP(mean): --  RR: 18 (17 Jul 2019 04:26) (18 - 19)  SpO2: 100% (17 Jul 2019 04:26) (98% - 100%)    I&O's Summary    16 Jul 2019 07:01  -  17 Jul 2019 07:00  --------------------------------------------------------  IN: 120 mL / OUT: 1500 mL / NET: -1380 mL    17 Jul 2019 07:01  -  17 Jul 2019 11:28  --------------------------------------------------------  IN: 240 mL / OUT: 400 mL / NET: -160 mL        CAPILLARY BLOOD GLUCOSE      POCT Blood Glucose.: 340 mg/dL (17 Jul 2019 07:48)  POCT Blood Glucose.: 302 mg/dL (16 Jul 2019 21:07)  POCT Blood Glucose.: 317 mg/dL (16 Jul 2019 16:33)  POCT Blood Glucose.: 366 mg/dL (16 Jul 2019 11:41)      PHYSICAL EXAM:  General: No distress. Comfortable.  HEENT: EOM intact.  Neck: Neck supple. JVP 10-12 elevated. No masses  Chest: BL posterior lower lobe rales  CV: RRR, Normal S1 and S2. No murmurs, rub, or gallops. Radial pulses normal. No LE edema  Abdomen: Soft, mildly distended, non-tender  Skin: No rashes or skin breakdown  Neurology: Alert and oriented times three. Sensation intact  Psych:  depresses       MEDICATIONS:  MEDICATIONS  (STANDING):  aspirin enteric coated 81 milliGRAM(s) Oral daily  atorvastatin 40 milliGRAM(s) Oral at bedtime  chlorhexidine 2% Cloths 1 Application(s) Topical daily  clopidogrel Tablet 75 milliGRAM(s) Oral daily  dextrose 5%. 1000 milliLiter(s) (50 mL/Hr) IV Continuous <Continuous>  dextrose 50% Injectable 12.5 Gram(s) IV Push once  dextrose 50% Injectable 25 Gram(s) IV Push once  dextrose 50% Injectable 25 Gram(s) IV Push once  docusate sodium 100 milliGRAM(s) Oral two times a day  furosemide   Injectable 40 milliGRAM(s) IV Push two times a day  hydrALAZINE 50 milliGRAM(s) Oral every 8 hours  insulin glargine Injectable (LANTUS) 28 Unit(s) SubCutaneous at bedtime  insulin lispro (HumaLOG) corrective regimen sliding scale   SubCutaneous three times a day before meals  insulin lispro (HumaLOG) corrective regimen sliding scale   SubCutaneous at bedtime  insulin lispro Injectable (HumaLOG) 11 Unit(s) SubCutaneous three times a day before meals  isosorbide   dinitrate Tablet (ISORDIL) 10 milliGRAM(s) Oral three times a day  levothyroxine 50 MICROGram(s) Oral daily  lidocaine   Patch 1 Patch Transdermal daily  melatonin 3 milliGRAM(s) Oral at bedtime  montelukast 10 milliGRAM(s) Oral daily  pantoprazole    Tablet 40 milliGRAM(s) Oral before breakfast  polyethylene glycol 3350 17 Gram(s) Oral daily  senna 2 Tablet(s) Oral at bedtime      LABS: All Labs Reviewed:                        7.5    8.22  )-----------( 284      ( 17 Jul 2019 09:19 )             24.3     07-17    133<L>  |  98  |  70<H>  ----------------------------<  286<H>  4.7   |  21<L>  |  2.02<H>    Ca    9.3      17 Jul 2019 06:35  Phos  4.1     07-16  Mg     2.3     07-16    TPro  7.4  /  Alb  3.5  /  TBili  0.2  /  DBili  x   /  AST  30  /  ALT  131<H>  /  AlkPhos  133<H>  07-16          Blood Culture:   Urine Culture      RADIOLOGY/EKG:    ASSESSMENT AND PLAN:  Pt is a 74 yo woman with PMHx of HTN, T2DM (A1c 7.4%), CAD s/p CABG (LIMA to LAD, SVG to OM, SVG to PDA 2014 at [facility]), non-dilated ICM (EF 20-25%), severe mitral regurgitation s/p mitral clip (6/13/19 Dr. Newman), severe pulm HTN, CKD, hypothyroidism, who is presenting to ED after experiencing worsening SO[facility] and [facility]. Of note, pt was recently discharged on 6/19 after having mitral clip procedure completed. She initially required BiPAP with improvement in her respiratory status following diuresis. Initiated on vasopressors and inotropes in CCU, which were subsequently weaned off. Infectious work up was negative.    She remains volume overloaded with NYHA Class IIIb symptoms. She is not adequately responding to current diuretic regimen. Her weight is unchanged today despite increase in diuretics. Her Scrt is continuing to downtrend and now close to baseline. She is normotensive tolerating uptitration of vasodilators.     Problem/Plan - 1:  ·  Problem: Acute on chronic systolic heart failure.  Plan: - Please increase ISDN to 20 mg PO TID, hold for SBP < 90  - Please increase lasix to 60 mg IV BID  - Continue hydralazine 50mg PO TID, hold for SBP < 90  - No ARB/ARNI/MRA at this time due to renal dysfunction  - Will hold on initiation of beta block until euvolemic  - Daily standing weights, strict I &Os.     Problem/Plan - 2:  ·  Problem: Severe mitral regurgitation.  Plan: - s/p Mitral clip   - TTE on 7/9: mild MR.     Problem/Plan - 3:  ·  Problem:  DM insulin glargine Injectable (LANTUS) 28 Unit(s) SubCutaneous at bedtime  insulin lispro (HumaLOG) corrective regimen sliding scale   SubCutaneous three times a day before meals  insulin lispro (HumaLOG) corrective regimen sliding scale   SubCutaneous at bedtime  insulin lispro Injectable (HumaLOG) 11 Unit(s) SubCutaneous three times a day before meals        Problem/Plan - 4:  ·  Problem: SVT (supraventricular tachycardia).  Plan: - Currently SR  - Continue to monitor on tele    -  CAD (coronary artery disease).  Plan: -Continue ASA, clopidogrel.  - Recheck digoxin level on Friday 7/19 as it was critically high on 7/16.     Problem/Plan - 5:  ·  Problem: Acute renal failure.  Plan: - Improving  - Diuresis as above  - Avoid nephrotoxins.   DVT PPX:    ADVANCED DIRECTIVE:    DISPOSITION:

## 2019-07-17 NOTE — PROGRESS NOTE ADULT - ATTENDING COMMENTS
No significant changes in symptoms despite additional diuretic. Her weight is also not changing.  Please increase lasix to 60 mg IV BID and increase ISDN to 20 mg po TID (hold SBP < 90).  Continue hydralazine 50 mg po TID, hold SBP < 90.  Daily standing weights, please.

## 2019-07-17 NOTE — PROGRESS NOTE ADULT - SUBJECTIVE AND OBJECTIVE BOX
[facility]   MD RAHEEL SHAH MD RUORU WONG, PA    TEL:  OFFICE: [phone]  DR SANTOYO CELL: [phone]  JSUTEN KENDALL CELL: [phone]  DR. NGUYEN CELL: [phone]    RENAL FOLLOW UP NOTE  --------------------------------------------------------------------------------  HPI:      Pt seen and examined at bedside.       PAST HISTORY  --------------------------------------------------------------------------------  No significant changes to PMH, PSH, FHx, SHx, unless otherwise noted    ALLERGIES & MEDICATIONS  --------------------------------------------------------------------------------  Allergies    azithromycin (Hives; Pruritus)    Intolerances      Standing Inpatient Medications  aspirin enteric coated 81 milliGRAM(s) Oral daily  atorvastatin 40 milliGRAM(s) Oral at bedtime  chlorhexidine 2% Cloths 1 Application(s) Topical daily  clopidogrel Tablet 75 milliGRAM(s) Oral daily  dextrose 5%. 1000 milliLiter(s) IV Continuous <Continuous>  dextrose 50% Injectable 12.5 Gram(s) IV Push once  dextrose 50% Injectable 25 Gram(s) IV Push once  dextrose 50% Injectable 25 Gram(s) IV Push once  docusate sodium 100 milliGRAM(s) Oral two times a day  furosemide   Injectable 40 milliGRAM(s) IV Push two times a day  hydrALAZINE 50 milliGRAM(s) Oral every 8 hours  insulin glargine Injectable (LANTUS) 28 Unit(s) SubCutaneous at bedtime  insulin lispro (HumaLOG) corrective regimen sliding scale   SubCutaneous three times a day before meals  insulin lispro (HumaLOG) corrective regimen sliding scale   SubCutaneous at bedtime  insulin lispro Injectable (HumaLOG) 11 Unit(s) SubCutaneous three times a day before meals  isosorbide   dinitrate Tablet (ISORDIL) 10 milliGRAM(s) Oral three times a day  levothyroxine 50 MICROGram(s) Oral daily  lidocaine   Patch 1 Patch Transdermal daily  melatonin 3 milliGRAM(s) Oral at bedtime  montelukast 10 milliGRAM(s) Oral daily  pantoprazole    Tablet 40 milliGRAM(s) Oral before breakfast  polyethylene glycol 3350 17 Gram(s) Oral daily  senna 2 Tablet(s) Oral at bedtime    PRN Inpatient Medications  acetaminophen   Tablet .. 650 milliGRAM(s) Oral every 6 hours PRN  dextrose 40% Gel 15 Gram(s) Oral once PRN  glucagon  Injectable 1 milliGRAM(s) IntraMuscular once PRN      REVIEW OF SYSTEMS  --------------------------------------------------------------------------------  General: no fever  CVS: no chest pain  RESP: intermittent sob, no cough  ABD: no abdominal pain  : no dysuria,  MSK: no edema     VITALS/PHYSICAL EXAM  --------------------------------------------------------------------------------  T(C): 37.1 (07-17-19 @ 04:26), Max: 37.2 (07-16-19 @ 20:38)  HR: 67 (07-17-19 @ 04:26) (67 - 75)  BP: 109/61 (07-17-19 @ 04:26) (100/56 - 109/61)  RR: 18 (07-17-19 @ 04:26) (18 - 19)  SpO2: 100% (07-17-19 @ 04:26) (98% - 100%)  Wt(kg): --        07-16-19 @ 07:01  -  07-17-19 @ 07:00  --------------------------------------------------------  IN: 120 mL / OUT: 1500 mL / NET: -1380 mL    07-17-19 @ 07:01  -  07-17-19 @ 10:17  --------------------------------------------------------  IN: 240 mL / OUT: 400 mL / NET: -160 mL      Physical Exam:  	Gen: NAD  	HEENT: MMM  	Pulm: Crackles B/L  	CV: S1S2  	Abd: Soft, +BS  	Ext: No LE edema B/L                      Neuro: Awake   	Skin: Warm and Dry   	 no polo    LABS/STUDIES  --------------------------------------------------------------------------------              7.5    8.22  >-----------<  284      [07-17-19 @ 09:19]              24.3     133  |  98  |  70  ----------------------------<  286      [07-17-19 @ 06:35]  4.7   |  21  |  2.02        Ca     9.3     [07-17-19 @ 06:35]      Mg     2.3     [07-16-19 @ 06:49]      Phos  4.1     [07-16-19 @ 06:49]    TPro  7.4  /  Alb  3.5  /  TBili  0.2  /  DBili  x   /  AST  30  /  ALT  131  /  AlkPhos  133  [07-16-19 @ 06:49]          Creatinine Trend:  SCr 2.02 [07-17 @ 06:35]  SCr 2.31 [07-16 @ 06:49]  SCr 2.45 [07-15 @ 06:10]  SCr 2.59 [07-14 @ 06:45]  SCr 2.72 [07-13 @ 07:09]    Urinalysis - [07-09-19 @ 13:28]      Color Yellow / Appearance Clear / SG 1.012 / pH 6.0      Gluc Negative / Ketone Negative  / Bili Negative / Urobili Negative       Blood Trace / Protein Trace / Leuk Est Negative / Nitrite Negative      RBC 1 / WBC 1 / Hyaline 0 / Gran  / Sq Epi  / Non Sq Epi 0 / Bacteria Moderate      Iron 42, TIBC 348, %sat 12      [07-05-19 @ 22:32]  Ferritin 130      [07-05-19 @ 22:32]  .4 (Ca --)      [04-25-19 @ 05:45]   --  PTH 43.55 (Ca --)      [09-10-18 @ 07:15]   --  PTH 36.60 (Ca --)      [09-08-18 @ 03:15]   --  Vitamin D (25OH) 25.9      [05-01-19 @ 04:00]  HbA1c 7.4      [07-05-19 @ 22:32]  TSH 2.00      [07-05-19 @ 22:32]  Lipid: chol 148, , HDL 35,       [06-01-19 @ 08:00]

## 2019-07-17 NOTE — PROGRESS NOTE ADULT - SUBJECTIVE AND OBJECTIVE BOX
Subjective:  - Reports not feeling well today. Continues to reports generalized weakness and fatigue.  - Notes orthopnea. Has not been OOB yet today. Denies SOB at rest, CP, palpitations, lightheadedness, dizziness.  - Endorses abdominal distention and constipation    Medications:  acetaminophen   Tablet .. 650 milliGRAM(s) Oral every 6 hours PRN  aspirin enteric coated 81 milliGRAM(s) Oral daily  atorvastatin 40 milliGRAM(s) Oral at bedtime  chlorhexidine 2% Cloths 1 Application(s) Topical daily  clopidogrel Tablet 75 milliGRAM(s) Oral daily  dextrose 40% Gel 15 Gram(s) Oral once PRN  dextrose 5%. 1000 milliLiter(s) IV Continuous <Continuous>  dextrose 50% Injectable 12.5 Gram(s) IV Push once  dextrose 50% Injectable 25 Gram(s) IV Push once  dextrose 50% Injectable 25 Gram(s) IV Push once  docusate sodium 100 milliGRAM(s) Oral two times a day  furosemide   Injectable 40 milliGRAM(s) IV Push two times a day  glucagon  Injectable 1 milliGRAM(s) IntraMuscular once PRN  hydrALAZINE 50 milliGRAM(s) Oral every 8 hours  insulin glargine Injectable (LANTUS) 28 Unit(s) SubCutaneous at bedtime  insulin lispro (HumaLOG) corrective regimen sliding scale   SubCutaneous three times a day before meals  insulin lispro (HumaLOG) corrective regimen sliding scale   SubCutaneous at bedtime  insulin lispro Injectable (HumaLOG) 11 Unit(s) SubCutaneous three times a day before meals  isosorbide   dinitrate Tablet (ISORDIL) 10 milliGRAM(s) Oral three times a day  levothyroxine 50 MICROGram(s) Oral daily  lidocaine   Patch 1 Patch Transdermal daily  melatonin 3 milliGRAM(s) Oral at bedtime  montelukast 10 milliGRAM(s) Oral daily  pantoprazole    Tablet 40 milliGRAM(s) Oral before breakfast  polyethylene glycol 3350 17 Gram(s) Oral daily  senna 2 Tablet(s) Oral at bedtime      Physical Exam:    Vitals:  Vital Signs Last 24 Hours  T(C): 37.1 (19 @ 04:26), Max: 37.2 (19 @ 20:38)  HR: 67 (19 @ 04:26) (67 - 75)  BP: 109/61 (19 @ 04:26) (100/56 - 109/61)  RR: 18 (19 @ 04:26) (18 - 19)  SpO2: 100% (19 @ 04:26) (98% - 100%)    Weight in k.3 ( @ 08:52)    I&O's Summary    2019 07:  -  2019 07:00  --------------------------------------------------------  IN: 120 mL / OUT: 1500 mL / NET: -1380 mL    2019 07:  -  2019 12:01  --------------------------------------------------------  IN: 240 mL / OUT: 400 mL / NET: -160 mL    Tele: SR HR 60-80    General: No distress. Comfortable.  HEENT: EOM intact.  Neck: Neck supple. JVP 10-12 elevated. No masses  Chest: BL posterior lower lobe rales  CV: RRR, Normal S1 and S2. No murmurs, rub, or gallops. Radial pulses normal. No LE edema  Abdomen: Soft, mildly distended, non-tender  Skin: No rashes or skin breakdown  Neurology: Alert and oriented times three. Sensation intact  Psych: Affect normal    Labs:                        7.5    8.22  )-----------( 284      ( 2019 09:19 )             24.3         133<L>  |  98  |  70<H>  ----------------------------<  286<H>  4.7   |  21<L>  |  2.02<H>    Ca    9.3      2019 06:35  Phos  4.1     07-16  Mg     2.3     07-16    TPro  7.4  /  Alb  3.5  /  TBili  0.2  /  DBili  x   /  AST  30  /  ALT  131<H>  /  AlkPhos  133<H>  07-16    Lactate, Blood: 0.8 mmol/L (07-15 @ 07:00)

## 2019-07-17 NOTE — CONSULT NOTE ADULT - SUBJECTIVE AND OBJECTIVE BOX
HPI:  Pt is a 72 yo woman with PMHx of HTN, T2DM, CAD s/p CABG (LIMA to LAD, SVG to OM, SVG to PDA 2014 at [facility]), non-dilated ICM (EF 20-25%), severe mitral regurgitation s/p mitral clip (6/13/19 Dr. Newman), severe pulm HTN, CKD, hypothyroidism, who is presenting [facility] ED [facility] Also complaining of intermittent chest pain. Of note, pt was recently discharged on 6/19 after having mitral clip procedure completed. Pt says that she has been experiencing SOB for about 1 week. However, she was never noted to be hypoxic in rehab until today. She was not able to provide much hx 2/2 SOB and being on bipap. Daughter at bedside reporting that pt was well immediately after discharge. However, she gradually developed worsening SOB. Better with rest initially. Nothing alleviating SOB now. It is constant throughout the day, made worse with exertion. No fevers, chills, nausea, vomiting, cough, sputum production, chest tightness, pressure, palpitations, LOC, syncope.    In the ED, pt afebrile, , /75, RR 22, O2 100% on 2L NC. She subsequently developed worsened work of breathing and was placed on bipap with improvement. She was given , lasix 40mg IVP x1. CXR significant for pulmonary edema. Anemia to 9.3/28.5. BUN/Cr 42/1.72 (54/2.54 on 6/19). BNP 6626. VBG 7.49/29/61/22. TTE pending. (05 Jul 2019 19:02)    Since being admitted she underwent aggressive diuresis with breathing treatments in the CCU. Required pressure support with vasopressors for a minimal amount of time and subsequently transferred to Medicine floor.    Palliative care team consulted for GOC. Pt very exhausted this AM. Closing her eyes throughout conversation. Says she feels very physically tired throughout the day. Also having general worrying, unclear if 2/2 general medical issues vs. not being at home. She reports having adequate appetite and had some foods at her bedside table. Voiding well. Still requires help with repositioning and eating. Currently without pain. Using Lidoderm patch for neck pain.    Spoke on the phone with daughter Elsa. She has been involved with mother's care throughout hospitalization. says her mother's main symptoms include lethargy, intermittent SOB and neck pain. Reassurance given that symptoms will be managed appropriately. Explored with Elsa [facility] and at this time she would overall want pt to be back home with family. Is interested in exploring DNR/DNI option, but would like to meet tomorrow to speak more about it. Says that she wants her mother to be comfortable, and most likely would not want aggressive or invasive therapies for her chronic illnesses.     PERTINENT PMH REVIEWED:  [ x] YES [ ] NO           SOCIAL HISTORY:  Significant other/partner:  [ ] YES  [x ] NO            Children:  [x] YES  [ ] NO                   Church/Spirituality: Scientologist  Substance hx:  [ ] YES   [x ] NO           Tobacco hx:  [ ] YES  [x ] NO             Alcohol hx: [ ] YES  [x ] NO        Home Opioid hx:  [ ] YES  [ x] NO   Living Situation: [ ] Home  [ ] Long term care  [x ] Rehab    REFERRALS:   [ ] Chaplaincy  [ ] Hospice  [ ] Child Life  [ ] Social Work  [ ] Case management [ ] Holistic Therapy     FAMILY HISTORY:  Family history of hypertension (Sibling): sister  Family history of diabetes mellitus (Sibling): brothers/sisters    BASELINE ADLs (prior to admission):  Independent [ ] moderately [ ] fully   Dependent   [ x] moderately [ ] fully    ADVANCE DIRECTIVES:  [ ] YES [x ] NO   DNR [ ] YES [x ] NO                      MOLST  [ ] YES [ x] NO    Living Will  [ ] YES [ ] NO    Health Care Proxy [ ] YES  [ ] NO      [ ] Surrogate  [x ] HCP  [ ] Guardian: Elsa Caden- needs official documentation        Phone#: [phone]    Allergies    azithromycin (Hives; Pruritus)    Intolerances    MEDICATIONS  (STANDING):  aspirin enteric coated 81 milliGRAM(s) Oral daily  atorvastatin 40 milliGRAM(s) Oral at bedtime  chlorhexidine 2% Cloths 1 Application(s) Topical daily  clopidogrel Tablet 75 milliGRAM(s) Oral daily  dextrose 5%. 1000 milliLiter(s) (50 mL/Hr) IV Continuous <Continuous>  dextrose 50% Injectable 12.5 Gram(s) IV Push once  dextrose 50% Injectable 25 Gram(s) IV Push once  dextrose 50% Injectable 25 Gram(s) IV Push once  docusate sodium 100 milliGRAM(s) Oral two times a day  furosemide   Injectable 40 milliGRAM(s) IV Push two times a day  hydrALAZINE 50 milliGRAM(s) Oral every 8 hours  insulin glargine Injectable (LANTUS) 28 Unit(s) SubCutaneous at bedtime  insulin lispro (HumaLOG) corrective regimen sliding scale   SubCutaneous three times a day before meals  insulin lispro (HumaLOG) corrective regimen sliding scale   SubCutaneous at bedtime  insulin lispro Injectable (HumaLOG) 11 Unit(s) SubCutaneous three times a day before meals  isosorbide   dinitrate Tablet (ISORDIL) 10 milliGRAM(s) Oral three times a day  levothyroxine 50 MICROGram(s) Oral daily  lidocaine   Patch 1 Patch Transdermal daily  melatonin 3 milliGRAM(s) Oral at bedtime  montelukast 10 milliGRAM(s) Oral daily  pantoprazole    Tablet 40 milliGRAM(s) Oral before breakfast  polyethylene glycol 3350 17 Gram(s) Oral daily  senna 2 Tablet(s) Oral at bedtime    MEDICATIONS  (PRN):  acetaminophen   Tablet .. 650 milliGRAM(s) Oral every 6 hours PRN Mild Pain (1 - 3), Moderate Pain (4 - 6)  dextrose 40% Gel 15 Gram(s) Oral once PRN Blood Glucose LESS THAN 70 milliGRAM(s)/deciliter  glucagon  Injectable 1 milliGRAM(s) IntraMuscular once PRN Glucose LESS THAN 70 milligrams/deciliter      PRESENT SYMPTOMS:  Source: [ x] Patient   [x ] Family   [ ] Team     Pain: [x ] YES [ ] NO (neck)  OLDCARTS:     Dyspnea: [ ] YES [ x] NO   Anxiety: [x ] YES [ ] NO  Fatigue: [x ] YES [ ] NO   Nausea: [ ] YES [x ] NO  Loss of appetite: [ ] YES [x ] NO   Constipation: [ ] YES [ x] NO     Other Symptoms:  [ ] All other review of systems negative   [ ] Unable to obtain due to poor mentation     Karnofsky Performance Score/Palliative Performance Status Version 2:         %  Protein Calorie Malutrition:  [x ] Mild   [ ] Moderate   [ ] Severe     Vital Signs Last 24 Hrs  T(C): 37.1 (17 Jul 2019 04:26), Max: 37.2 (16 Jul 2019 20:38)  T(F): 98.8 (17 Jul 2019 04:26), Max: 98.9 (16 Jul 2019 20:38)  HR: 67 (17 Jul 2019 04:26) (67 - 75)  BP: 109/61 (17 Jul 2019 04:26) (100/56 - 109/61)  BP(mean): --  RR: 18 (17 Jul 2019 04:26) (18 - 19)  SpO2: 100% (17 Jul 2019 04:26) (98% - 100%)    Physical Exam:    General: [ x] Alert,  A&O x 2    [ ] lethargic   [ ] Agitated   [ ] Cachexia   HEENT: [x ] Normal   [ ] Dry mouth   [ ] ET Tube    [ ] Trach   Lungs: [x ] Clear [ ] Rhonchi  [ ] Crackles [ ] Wheezing [ ] Tachypnea  [ ] Audible excessive secretions   Cardiovascular:  [x ] Regular rate and rhythm  [ ] Irregular [ ] Tachycardia   [ ] Bradycardia   Abdomen: [x ] Soft  [ ] Distended  [ ]  [x ] +BS  [x ] Non tender [ ] Tender  [ ]PEG   [ ] NGT   Last BM:     Genitourinary: [x ] Normal [ ] Incontinent   [ ] Oliguria/Anuria   [ ] Oscar  Musculoskeletal:  [ ] Normal   [x ] Generalized weakness  [ ] Bedbound   Neurological: [x ] No focal deficits  [ ] Cognitive impairment     Skin: [ x] Normal   [ ] Pressure ulcers     LABS:                        7.5    8.22  )-----------( 284      ( 17 Jul 2019 09:19 )             24.3     07-17    133<L>  |  98  |  70<H>  ----------------------------<  286<H>  4.7   |  21<L>  |  2.02<H>    Ca    9.3      17 Jul 2019 06:35  Phos  4.1     07-16  Mg     2.3     07-16    TPro  7.4  /  Alb  3.5  /  TBili  0.2  /  DBili  x   /  AST  30  /  ALT  131<H>  /  AlkPhos  133<H>  07-16        I&O's Summary    16 Jul 2019 07:01  -  17 Jul 2019 07:00  --------------------------------------------------------  IN: 120 mL / OUT: 1500 mL / NET: -1380 mL    17 Jul 2019 07:01  -  17 Jul 2019 11:50  --------------------------------------------------------  IN: 240 mL / OUT: 400 mL / NET: -160 mL HPI:  Pt is a 74 yo woman with PMHx of HTN, T2DM, CAD s/p CABG (LIMA to LAD, SVG to OM, SVG to PDA 2014 at [facility]), non-dilated ICM (EF 20-25%), severe mitral regurgitation s/p mitral clip (6/13/19 Dr. Newman), severe pulm HTN, CKD, hypothyroidism, who is [facility] ED [facility] Also complaining of intermittent chest pain. Of note, pt was recently discharged on 6/19 after having mitral clip procedure completed. Pt says that she has been experiencing SOB for about 1 week. However, she was never noted to be hypoxic in rehab until today. She was not able to provide much hx 2/2 SOB and being on bipap. Daughter at bedside reporting that pt was well immediately after discharge. However, she gradually developed worsening SOB. Better with rest initially. Nothing alleviating SOB now. It is constant throughout the day, made worse with exertion. No fevers, chills, nausea, vomiting, cough, sputum production, chest tightness, pressure, palpitations, LOC, syncope.    In the ED, pt afebrile, , /75, RR 22, O2 100% on 2L NC. She subsequently developed worsened work of breathing and was placed on bipap with improvement. She was given , lasix 40mg IVP x1. CXR significant for pulmonary edema. Anemia to 9.3/28.5. BUN/Cr 42/1.72 (54/2.54 on 6/19). BNP 6626. VBG 7.49/29/61/22. TTE pending. (05 Jul 2019 19:02)    Since being admitted she underwent aggressive diuresis w/ lasix gtt and breathing treatments in the CCU for acute decompensated heart failure. Required pressure support with vasopressors for a minimal amount of time and subsequently transferred to Medicine floor. Now on lasix 40mg IV BID with inhalers for underlying asthma.    Palliative care team consulted for GOC. Pt very exhausted this AM. Closing her eyes throughout conversation. Says she feels very physically tired throughout the day. Also having general worrying, unclear if 2/2 general medical issues vs. not being at home. She reports having adequate appetite and had some foods at her bedside table. Voiding well. Still requires help with repositioning and eating. Currently without pain. Using Lidoderm patch for neck pain.    Spoke on the phone with daughter Elsa. She has been involved with mother's care throughout hospitalization. says her mother's main symptoms include lethargy, intermittent SOB and neck pain. Reassurance given that symptoms will be managed appropriately. Explored with Elsa [facility] and at this time she would overall want pt to be back home with family. Is interested in exploring DNR/DNI option, but would like to meet tomorrow to speak more about it. Says that she wants her mother to be comfortable, and most likely would not want aggressive or invasive therapies for her chronic illnesses.     PERTINENT PMH REVIEWED:  [ x] YES [ ] NO           SOCIAL HISTORY:  Significant other/partner:  [ ] YES  [x ] NO            Children:  [x] YES  [ ] NO                   Rastafari/Spirituality: Taoism  Substance hx:  [ ] YES   [x ] NO           Tobacco hx:  [ ] YES  [x ] NO             Alcohol hx: [ ] YES  [x ] NO        Home Opioid hx:  [ ] YES  [ x] NO   Living Situation: [ ] Home  [ ] Long term care  [x ] Rehab    REFERRALS:   [ ] Chaplaincy  [ ] Hospice  [ ] Child Life  [ ] Social Work  [ ] Case management [ ] Holistic Therapy     FAMILY HISTORY:  Family history of hypertension (Sibling): sister  Family history of diabetes mellitus (Sibling): brothers/sisters    BASELINE ADLs (prior to admission):  Independent [ ] moderately [ ] fully   Dependent   [ x] moderately [ ] fully    ADVANCE DIRECTIVES:  [ ] YES [x ] NO   DNR [ ] YES [x ] NO                      MOLST  [ ] YES [ x] NO    Living Will  [ ] YES [ ] NO    Health Care Proxy [ ] YES  [ ] NO      [ ] Surrogate  [x ] HCP  [ ] Guardian: Elsa Caden- needs official documentation        Phone#: [phone]    Allergies    azithromycin (Hives; Pruritus)    Intolerances    MEDICATIONS  (STANDING):  aspirin enteric coated 81 milliGRAM(s) Oral daily  atorvastatin 40 milliGRAM(s) Oral at bedtime  chlorhexidine 2% Cloths 1 Application(s) Topical daily  clopidogrel Tablet 75 milliGRAM(s) Oral daily  dextrose 5%. 1000 milliLiter(s) (50 mL/Hr) IV Continuous <Continuous>  dextrose 50% Injectable 12.5 Gram(s) IV Push once  dextrose 50% Injectable 25 Gram(s) IV Push once  dextrose 50% Injectable 25 Gram(s) IV Push once  docusate sodium 100 milliGRAM(s) Oral two times a day  furosemide   Injectable 40 milliGRAM(s) IV Push two times a day  hydrALAZINE 50 milliGRAM(s) Oral every 8 hours  insulin glargine Injectable (LANTUS) 28 Unit(s) SubCutaneous at bedtime  insulin lispro (HumaLOG) corrective regimen sliding scale   SubCutaneous three times a day before meals  insulin lispro (HumaLOG) corrective regimen sliding scale   SubCutaneous at bedtime  insulin lispro Injectable (HumaLOG) 11 Unit(s) SubCutaneous three times a day before meals  isosorbide   dinitrate Tablet (ISORDIL) 10 milliGRAM(s) Oral three times a day  levothyroxine 50 MICROGram(s) Oral daily  lidocaine   Patch 1 Patch Transdermal daily  melatonin 3 milliGRAM(s) Oral at bedtime  montelukast 10 milliGRAM(s) Oral daily  pantoprazole    Tablet 40 milliGRAM(s) Oral before breakfast  polyethylene glycol 3350 17 Gram(s) Oral daily  senna 2 Tablet(s) Oral at bedtime    MEDICATIONS  (PRN):  acetaminophen   Tablet .. 650 milliGRAM(s) Oral every 6 hours PRN Mild Pain (1 - 3), Moderate Pain (4 - 6)  dextrose 40% Gel 15 Gram(s) Oral once PRN Blood Glucose LESS THAN 70 milliGRAM(s)/deciliter  glucagon  Injectable 1 milliGRAM(s) IntraMuscular once PRN Glucose LESS THAN 70 milligrams/deciliter      PRESENT SYMPTOMS:  Source: [ x] Patient   [x ] Family   [ ] Team     Pain: [x ] YES [ ] NO (neck)  OLDCARTS:     Dyspnea: [ ] YES [ x] NO   Anxiety: [x ] YES [ ] NO  Fatigue: [x ] YES [ ] NO   Nausea: [ ] YES [x ] NO  Loss of appetite: [ ] YES [x ] NO   Constipation: [ ] YES [ x] NO     Other Symptoms:  [ ] All other review of systems negative   [ ] Unable to obtain due to poor mentation     Karnofsky Performance Score/Palliative Performance Status Version 2:         %  Protein Calorie Malutrition:  [x ] Mild   [ ] Moderate   [ ] Severe     Vital Signs Last 24 Hrs  T(C): 37.1 (17 Jul 2019 04:26), Max: 37.2 (16 Jul 2019 20:38)  T(F): 98.8 (17 Jul 2019 04:26), Max: 98.9 (16 Jul 2019 20:38)  HR: 67 (17 Jul 2019 04:26) (67 - 75)  BP: 109/61 (17 Jul 2019 04:26) (100/56 - 109/61)  BP(mean): --  RR: 18 (17 Jul 2019 04:26) (18 - 19)  SpO2: 100% (17 Jul 2019 04:26) (98% - 100%)    Physical Exam:    General: [ x] Alert,  A&O x 2    [ ] lethargic   [ ] Agitated   [ ] Cachexia   HEENT: [x ] Normal   [ ] Dry mouth   [ ] ET Tube    [ ] Trach   Lungs: [x ] Clear [ ] Rhonchi  [ ] Crackles [ ] Wheezing [ ] Tachypnea  [ ] Audible excessive secretions   Cardiovascular:  [x ] Regular rate and rhythm  [ ] Irregular [ ] Tachycardia   [ ] Bradycardia   Abdomen: [x ] Soft  [ ] Distended  [ ]  [x ] +BS  [x ] Non tender [ ] Tender  [ ]PEG   [ ] NGT   Last BM:     Genitourinary: [x ] Normal [ ] Incontinent   [ ] Oliguria/Anuria   [ ] Oscar  Musculoskeletal:  [ ] Normal   [x ] Generalized weakness  [ ] Bedbound   Neurological: [x ] No focal deficits  [ ] Cognitive impairment     Skin: [ x] Normal   [ ] Pressure ulcers     LABS:                        7.5    8.22  )-----------( 284      ( 17 Jul 2019 09:19 )             24.3     07-17    133<L>  |  98  |  70<H>  ----------------------------<  286<H>  4.7   |  21<L>  |  2.02<H>    Ca    9.3      17 Jul 2019 06:35  Phos  4.1     07-16  Mg     2.3     07-16    TPro  7.4  /  Alb  3.5  /  TBili  0.2  /  DBili  x   /  AST  30  /  ALT  131<H>  /  AlkPhos  133<H>  07-16        I&O's Summary    16 Jul 2019 07:01  -  17 Jul 2019 07:00  --------------------------------------------------------  IN: 120 mL / OUT: 1500 mL / NET: -1380 mL    17 Jul 2019 07:01  -  17 Jul 2019 11:50  --------------------------------------------------------  IN: 240 mL / OUT: 400 mL / NET: -160 mL HPI:  Pt is a 74 yo woman with PMHx of HTN, T2DM, CAD s/p CABG (LIMA to LAD, SVG to OM, SVG to PDA 2014 at [facility]), non-dilated ICM (EF 20-25%), severe mitral regurgitation s/p mitral clip (6/13/19 Dr. Newman), severe pulm HTN, CKD, hypothyroidism, who is presenting [facility] ED [facility] Also complaining of intermittent chest pain. Of note, pt was recently discharged on 6/19 after having mitral clip procedure completed. Pt says that she has been experiencing SOB for about 1 week. However, she was never noted to be hypoxic in rehab until today. She was not able to provide much hx 2/2 SOB and being on bipap. Daughter at bedside reporting that pt was well immediately after discharge. However, she gradually developed worsening SOB. Better with rest initially. Nothing alleviating SOB now. It is constant throughout the day, made worse with exertion. No fevers, chills, nausea, vomiting, cough, sputum production, chest tightness, pressure, palpitations, LOC, syncope.    In the ED, pt afebrile, , /75, RR 22, O2 100% on 2L NC. She subsequently developed worsened work of breathing and was placed on bipap with improvement. She was given , lasix 40mg IVP x1. CXR significant for pulmonary edema. Anemia to 9.3/28.5. BUN/Cr 42/1.72 (54/2.54 on 6/19). BNP 6626. VBG 7.49/29/61/22. TTE pending. (05 Jul 2019 19:02)    Since being admitted she underwent aggressive diuresis w/ lasix gtt and breathing treatments in the CCU for acute decompensated heart failure. Required pressure support with vasopressors for a minimal amount of time and subsequently transferred to Medicine floor. Now on lasix 40mg IV BID with inhalers for underlying asthma. NYHA Class IIIb symptoms with improvement in SCr, but overall still hypervolemic. For this reason, has not been transitioned to PO lasix yet.     Palliative care team consulted for GOC. Pt very exhausted this AM. Closing her eyes throughout conversation. Says she feels very physically tired throughout the day. Also having general worrying, unclear if 2/2 general medical issues vs. not being at home. She reports having adequate appetite and had some foods at her bedside table. Voiding well. Still requires help with repositioning and eating. Currently without pain. Using Lidoderm patch for neck pain.    Spoke on the phone with daughter Elsa. She has been involved with mother's care throughout hospitalization. says her mother's main symptoms include lethargy, intermittent SOB and neck pain. Reassurance given that symptoms will be managed appropriately. Explored with Elsa [facility] and at this time she would overall want pt to be back home with family. Is interested in exploring DNR/DNI option, but would like to meet tomorrow to speak more about it. Says that she wants her mother to be comfortable, and most likely would not want aggressive or invasive therapies for her chronic illnesses.     PERTINENT PMH REVIEWED:  [ x] YES [ ] NO           SOCIAL HISTORY:  Significant other/partner:  [ ] YES  [x ] NO            Children:  [x] YES  [ ] NO                   Sikh/Spirituality: Gnosticism  Substance hx:  [ ] YES   [x ] NO           Tobacco hx:  [ ] YES  [x ] NO             Alcohol hx: [ ] YES  [x ] NO        Home Opioid hx:  [ ] YES  [ x] NO   Living Situation: [ ] Home  [ ] Long term care  [x ] Rehab    REFERRALS:   [ ] Chaplaincy  [ ] Hospice  [ ] Child Life  [ ] Social Work  [ ] Case management [ ] Holistic Therapy     FAMILY HISTORY:  Family history of hypertension (Sibling): sister  Family history of diabetes mellitus (Sibling): brothers/sisters    BASELINE ADLs (prior to admission):  Independent [ ] moderately [ ] fully   Dependent   [ x] moderately [ ] fully    ADVANCE DIRECTIVES:  [ ] YES [x ] NO   DNR [ ] YES [x ] NO                      MOLST  [ ] YES [ x] NO    Living Will  [ ] YES [ ] NO    Health Care Proxy [ ] YES  [ ] NO      [ ] Surrogate  [x ] HCP  [ ] Guardian: Elsa Negrete- needs official documentation        Phone#: [phone]    Allergies    azithromycin (Hives; Pruritus)    Intolerances    MEDICATIONS  (STANDING):  aspirin enteric coated 81 milliGRAM(s) Oral daily  atorvastatin 40 milliGRAM(s) Oral at bedtime  chlorhexidine 2% Cloths 1 Application(s) Topical daily  clopidogrel Tablet 75 milliGRAM(s) Oral daily  dextrose 5%. 1000 milliLiter(s) (50 mL/Hr) IV Continuous <Continuous>  dextrose 50% Injectable 12.5 Gram(s) IV Push once  dextrose 50% Injectable 25 Gram(s) IV Push once  dextrose 50% Injectable 25 Gram(s) IV Push once  docusate sodium 100 milliGRAM(s) Oral two times a day  furosemide   Injectable 40 milliGRAM(s) IV Push two times a day  hydrALAZINE 50 milliGRAM(s) Oral every 8 hours  insulin glargine Injectable (LANTUS) 28 Unit(s) SubCutaneous at bedtime  insulin lispro (HumaLOG) corrective regimen sliding scale   SubCutaneous three times a day before meals  insulin lispro (HumaLOG) corrective regimen sliding scale   SubCutaneous at bedtime  insulin lispro Injectable (HumaLOG) 11 Unit(s) SubCutaneous three times a day before meals  isosorbide   dinitrate Tablet (ISORDIL) 10 milliGRAM(s) Oral three times a day  levothyroxine 50 MICROGram(s) Oral daily  lidocaine   Patch 1 Patch Transdermal daily  melatonin 3 milliGRAM(s) Oral at bedtime  montelukast 10 milliGRAM(s) Oral daily  pantoprazole    Tablet 40 milliGRAM(s) Oral before breakfast  polyethylene glycol 3350 17 Gram(s) Oral daily  senna 2 Tablet(s) Oral at bedtime    MEDICATIONS  (PRN):  acetaminophen   Tablet .. 650 milliGRAM(s) Oral every 6 hours PRN Mild Pain (1 - 3), Moderate Pain (4 - 6)  dextrose 40% Gel 15 Gram(s) Oral once PRN Blood Glucose LESS THAN 70 milliGRAM(s)/deciliter  glucagon  Injectable 1 milliGRAM(s) IntraMuscular once PRN Glucose LESS THAN 70 milligrams/deciliter      PRESENT SYMPTOMS:  Source: [ x] Patient   [x ] Family   [ ] Team     Pain: [x ] YES [ ] NO (neck)  OLDCARTS:     Dyspnea: [ ] YES [ x] NO   SOB: [ x] YES [ ] NO  Anxiety: [x ] YES [ ] NO  Fatigue: [x ] YES [ ] NO   Nausea: [ ] YES [x ] NO  Loss of appetite: [ ] YES [x ] NO   Constipation: [ ] YES [ x] NO     Other Symptoms:  [ ] All other review of systems negative   [ ] Unable to obtain due to poor mentation     Karnofsky Performance Score/Palliative Performance Status Version 2:         %  Protein Calorie Malutrition:  [x ] Mild   [ ] Moderate   [ ] Severe     Vital Signs Last 24 Hrs  T(C): 37.1 (17 Jul 2019 04:26), Max: 37.2 (16 Jul 2019 20:38)  T(F): 98.8 (17 Jul 2019 04:26), Max: 98.9 (16 Jul 2019 20:38)  HR: 67 (17 Jul 2019 04:26) (67 - 75)  BP: 109/61 (17 Jul 2019 04:26) (100/56 - 109/61)  BP(mean): --  RR: 18 (17 Jul 2019 04:26) (18 - 19)  SpO2: 100% (17 Jul 2019 04:26) (98% - 100%)    Physical Exam:    General: [ x] Alert,  A&O x 2    [ ] lethargic   [ ] Agitated   [ ] Cachexia   HEENT: [x ] Normal   [ ] Dry mouth   [ ] ET Tube    [ ] Trach   Lungs: [x ] Clear [ ] Rhonchi  [ ] Crackles [ ] Wheezing [ ] Tachypnea  [ ] Audible excessive secretions   Cardiovascular:  [x ] Regular rate and rhythm  [ ] Irregular [ ] Tachycardia   [ ] Bradycardia   Abdomen: [x ] Soft  [ ] Distended  [ ]  [x ] +BS  [x ] Non tender [ ] Tender  [ ]PEG   [ ] NGT   Last BM:     Genitourinary: [x ] Normal [ ] Incontinent   [ ] Oliguria/Anuria   [ ] Oscar  Musculoskeletal:  [ ] Normal   [x ] Generalized weakness  [ ] Bedbound   Neurological: [x ] No focal deficits  [ ] Cognitive impairment     Skin: [ x] Normal   [ ] Pressure ulcers     LABS:                        7.5    8.22  )-----------( 284      ( 17 Jul 2019 09:19 )             24.3     07-17    133<L>  |  98  |  70<H>  ----------------------------<  286<H>  4.7   |  21<L>  |  2.02<H>    Ca    9.3      17 Jul 2019 06:35  Phos  4.1     07-16  Mg     2.3     07-16    TPro  7.4  /  Alb  3.5  /  TBili  0.2  /  DBili  x   /  AST  30  /  ALT  131<H>  /  AlkPhos  133<H>  07-16        I&O's Summary    16 Jul 2019 07:01  -  17 Jul 2019 07:00  --------------------------------------------------------  IN: 120 mL / OUT: 1500 mL / NET: -1380 mL    17 Jul 2019 07:01  -  17 Jul 2019 11:50  --------------------------------------------------------  IN: 240 mL / OUT: 400 mL / NET: -160 mL HPI:  Pt is a 74 yo woman with PMHx of HTN, T2DM, CAD s/p CABG (LIMA to LAD, SVG to OM, SVG to PDA 2014 at [facility]), non-dilated ICM (EF 20-25%), severe mitral regurgitation s/p mitral clip (6/13/19 Dr. Newman), severe pulm HTN, CKD, hypothyroidism, who is presenting to ED after experiencing worsening SOB at [facility]. Also complaining of intermittent chest pain. Of note, pt was recently discharged on 6/19 after having mitral clip procedure completed. Pt says that she has been experiencing SOB for about 1 week. However, she was never noted to be hypoxic in rehab until today. She was not able to provide much hx 2/2 SOB and being on bipap. Daughter at bedside reporting that pt was well immediately after discharge. However, she gradually developed worsening SOB. Better with rest initially. Nothing alleviating SOB now. It is constant throughout the day, made worse with exertion. No fevers, chills, nausea, vomiting, cough, sputum production, chest tightness, pressure, palpitations, LOC, syncope.    In the ED, pt afebrile, , /75, RR 22, O2 100% on 2L NC. She subsequently developed worsened work of breathing and was placed on bipap with improvement. She was given , lasix 40mg IVP x1. CXR significant for pulmonary edema. Anemia to 9.3/28.5. BUN/Cr 42/1.72 (54/2.54 on 6/19). BNP 6626. VBG 7.49/29/61/22. TTE pending. (05 Jul 2019 19:02)    Since being admitted she underwent aggressive diuresis w/ lasix gtt and breathing treatments in the CCU for acute decompensated heart failure. Required pressure support with vasopressors for a minimal amount of time and subsequently transferred to Medicine floor. Now on lasix 40mg IV BID with inhalers for underlying asthma. NYHA Class IIIb symptoms with improvement in SCr, but overall still hypervolemic. For this reason, has not been transitioned to PO lasix yet.     Palliative care team consulted for GOC. Pt very exhausted this AM. Closing her eyes throughout conversation. Says she feels very physically tired throughout the day. Also having general worrying, unclear if 2/2 general medical issues vs. not being at home. She reports having adequate appetite and had some foods at her bedside table. Has urinary and fecal incontinence. Needs assistance with basic needs such repositioning and eating 2/2 overall weakness and deconditioning. Currently without pain. Using Lidoderm patch for neck pain.    Spoke on the phone with daughter Elsa. She has been involved with mother's care throughout hospitalization. says her mother's main symptoms include lethargy, intermittent SOB and neck pain. Reassurance given that symptoms will be managed appropriately. Explored with Elsa [facility] and at this time she would overall want pt to be back home with family. Is interested in exploring DNR/DNI option, but would like to meet tomorrow to speak more about it. Says that she wants her mother to be comfortable, and most likely would not want aggressive or invasive therapies for her chronic illnesses.     PERTINENT PMH REVIEWED:  [ x] YES [ ] NO           SOCIAL HISTORY:  Significant other/partner:  [ ] YES  [x ] NO            Children:  [x] YES  [ ] NO                   Restorationism/Spirituality: Restorationism  Substance hx:  [ ] YES   [x ] NO           Tobacco hx:  [ ] YES  [x ] NO             Alcohol hx: [ ] YES  [x ] NO        Home Opioid hx:  [ ] YES  [ x] NO   Living Situation: [ ] Home  [ ] Long term care  [x ] Rehab    REFERRALS:   [ ] Chaplaincy  [ ] Hospice  [ ] Child Life  [ ] Social Work  [ ] Case management [ ] Holistic Therapy     FAMILY HISTORY:  Family history of hypertension (Sibling): sister  Family history of diabetes mellitus (Sibling): brothers/sisters    BASELINE ADLs (prior to admission):  Independent [ ] moderately [ ] fully   Dependent   [ x] moderately [ ] fully    ADVANCE DIRECTIVES:  [ ] YES [x ] NO   DNR [ ] YES [x ] NO                      MOLST  [ ] YES [ x] NO    Living Will  [ ] YES [ ] NO    Health Care Proxy [ ] YES  [ ] NO      [ ] Surrogate  [x ] HCP  [ ] Guardian: Elsa Caden- needs official documentation        Phone#: [phone]    Allergies    azithromycin (Hives; Pruritus)    Intolerances    MEDICATIONS  (STANDING):  aspirin enteric coated 81 milliGRAM(s) Oral daily  atorvastatin 40 milliGRAM(s) Oral at bedtime  chlorhexidine 2% Cloths 1 Application(s) Topical daily  clopidogrel Tablet 75 milliGRAM(s) Oral daily  dextrose 5%. 1000 milliLiter(s) (50 mL/Hr) IV Continuous <Continuous>  dextrose 50% Injectable 12.5 Gram(s) IV Push once  dextrose 50% Injectable 25 Gram(s) IV Push once  dextrose 50% Injectable 25 Gram(s) IV Push once  docusate sodium 100 milliGRAM(s) Oral two times a day  furosemide   Injectable 40 milliGRAM(s) IV Push two times a day  hydrALAZINE 50 milliGRAM(s) Oral every 8 hours  insulin glargine Injectable (LANTUS) 28 Unit(s) SubCutaneous at bedtime  insulin lispro (HumaLOG) corrective regimen sliding scale   SubCutaneous three times a day before meals  insulin lispro (HumaLOG) corrective regimen sliding scale   SubCutaneous at bedtime  insulin lispro Injectable (HumaLOG) 11 Unit(s) SubCutaneous three times a day before meals  isosorbide   dinitrate Tablet (ISORDIL) 10 milliGRAM(s) Oral three times a day  levothyroxine 50 MICROGram(s) Oral daily  lidocaine   Patch 1 Patch Transdermal daily  melatonin 3 milliGRAM(s) Oral at bedtime  montelukast 10 milliGRAM(s) Oral daily  pantoprazole    Tablet 40 milliGRAM(s) Oral before breakfast  polyethylene glycol 3350 17 Gram(s) Oral daily  senna 2 Tablet(s) Oral at bedtime    MEDICATIONS  (PRN):  acetaminophen   Tablet .. 650 milliGRAM(s) Oral every 6 hours PRN Mild Pain (1 - 3), Moderate Pain (4 - 6)  dextrose 40% Gel 15 Gram(s) Oral once PRN Blood Glucose LESS THAN 70 milliGRAM(s)/deciliter  glucagon  Injectable 1 milliGRAM(s) IntraMuscular once PRN Glucose LESS THAN 70 milligrams/deciliter      PRESENT SYMPTOMS:  Source: [ x] Patient   [x ] Family   [ ] Team     Pain: [x ] YES [ ] NO (neck)  OLDCARTS:     Dyspnea on exertion: [ x] YES [ ] NO   SOB: [ ] YES [x ] NO  Anxiety: [x ] YES [ ] NO  Fatigue: [x ] YES [ ] NO   Nausea: [ ] YES [x ] NO  Loss of appetite: [ ] YES [x ] NO   Constipation: [ ] YES [ x] NO     Other Symptoms:  [ ] All other review of systems negative   [ ] Unable to obtain due to poor mentation     Karnofsky Performance Score/Palliative Performance Status Version 2:         %  Protein Calorie Malutrition:  [x ] Mild   [ ] Moderate   [ ] Severe     Vital Signs Last 24 Hrs  T(C): 37.1 (17 Jul 2019 04:26), Max: 37.2 (16 Jul 2019 20:38)  T(F): 98.8 (17 Jul 2019 04:26), Max: 98.9 (16 Jul 2019 20:38)  HR: 67 (17 Jul 2019 04:26) (67 - 75)  BP: 109/61 (17 Jul 2019 04:26) (100/56 - 109/61)  BP(mean): --  RR: 18 (17 Jul 2019 04:26) (18 - 19)  SpO2: 100% (17 Jul 2019 04:26) (98% - 100%)    Physical Exam:    General: [ x] Alert,  A&O x 2    [ ] lethargic   [ ] Agitated   [ ] Cachexia   HEENT: [x ] Normal   [ ] Dry mouth   [ ] ET Tube    [ ] Trach   Lungs: [x ] Clear [ ] Rhonchi  [ ] Crackles [ ] Wheezing [ ] Tachypnea  [ ] Audible excessive secretions   Cardiovascular:  [x ] Regular rate and rhythm  [ ] Irregular [ ] Tachycardia   [ ] Bradycardia   Abdomen: [x ] Soft  [ ] Distended  [ ]  [x ] +BS  [x ] Non tender [ ] Tender  [ ]PEG   [ ] NGT   Last BM:     Genitourinary: [x ] Normal [ ] Incontinent   [ ] Oliguria/Anuria   [ ] Oscar  Musculoskeletal:  [ ] Normal   [x ] Generalized weakness  [ ] Bedbound   Neurological: [x ] No focal deficits  [ ] Cognitive impairment     Skin: [ x] Normal   [ ] Pressure ulcers     LABS:                        7.5    8.22  )-----------( 284      ( 17 Jul 2019 09:19 )             24.3     07-17    133<L>  |  98  |  70<H>  ----------------------------<  286<H>  4.7   |  21<L>  |  2.02<H>    Ca    9.3      17 Jul 2019 06:35  Phos  4.1     07-16  Mg     2.3     07-16    TPro  7.4  /  Alb  3.5  /  TBili  0.2  /  DBili  x   /  AST  30  /  ALT  131<H>  /  AlkPhos  133<H>  07-16        I&O's Summary    16 Jul 2019 07:01  -  17 Jul 2019 07:00  --------------------------------------------------------  IN: 120 mL / OUT: 1500 mL / NET: -1380 mL    17 Jul 2019 07:01  -  17 Jul 2019 11:50  --------------------------------------------------------  IN: 240 mL / OUT: 400 mL / NET: -160 mL HPI:  Pt is a 74 yo woman with PMHx of HTN, T2DM, CAD s/p CABG (LIMA to LAD, SVG to OM, SVG to PDA 2014 at [facility]), non-dilated ICM (EF 20-25%), severe mitral regurgitation s/p mitral clip (6/13/19 Dr. Newman), severe pulm HTN, CKD, hypothyroidism, who is presenting to ED [facility] Also complaining of intermittent chest pain. Of note, pt was recently discharged on 6/19 after having mitral clip procedure completed. Pt says that she has been experiencing SOB for about 1 week. However, she was never noted to be hypoxic in rehab until today. She was not able to provide much hx 2/2 SOB and being on bipap. Daughter at bedside reporting that pt was well immediately after discharge. However, she gradually developed worsening SOB. Better with rest initially. Nothing alleviating SOB now. It is constant throughout the day, made worse with exertion. No fevers, chills, nausea, vomiting, cough, sputum production, chest tightness, pressure, palpitations, LOC, syncope.    In the ED, pt afebrile, , /75, RR 22, O2 100% on 2L NC. She subsequently developed worsened work of breathing and was placed on bipap with improvement. She was given , lasix 40mg IVP x1. CXR significant for pulmonary edema. Anemia to 9.3/28.5. BUN/Cr 42/1.72 (54/2.54 on 6/19). BNP 6626. VBG 7.49/29/61/22. TTE pending. (05 Jul 2019 19:02)    Since being admitted she underwent aggressive diuresis w/ lasix gtt and breathing treatments in the CCU for acute decompensated heart failure. Required pressure support with inotropes and  vasopressors for a short amount of time and subsequently transferred to Medicine floor. Now on lasix 40mg IV BID with inhalers for underlying asthma. NYHA Class IIIb symptoms with improvement in SCr, but overall still hypervolemic. For this reason, has not been transitioned to PO lasix yet.     Palliative care team consulted for GOC. Pt very exhausted this AM. Closing her eyes throughout conversation. Says she feels very physically tired throughout the day. Also having general worrying, unclear if 2/2 general medical issues vs. not being at home. She reports having adequate appetite and had some foods at her bedside table. Has urinary and fecal incontinence. Needs assistance with basic needs such repositioning and eating 2/2 overall weakness and deconditioning. Currently without pain. Using Lidoderm patch for neck pain.    Spoke on the phone with daughter Elsa. She has been involved with mother's care throughout hospitalization. says her mother's main symptoms include lethargy, intermittent SOB and neck pain. Reassurance given that symptoms will be managed appropriately. Explored with Elsa [facility] and at this time she would overall want pt to be back home with family. Is interested in exploring DNR/DNI option, but would like to meet tomorrow to speak more about it. Says that she wants her mother to be comfortable, and most likely would not want aggressive or invasive therapies for her chronic illnesses.     PERTINENT PMH REVIEWED:  [ x] YES [ ] NO           SOCIAL HISTORY:  Significant other/partner:  [ ] YES  [x ] NO            Children:  [x] YES  [ ] NO                   Congregation/Spirituality: Jehovah's witness  Substance hx:  [ ] YES   [x ] NO           Tobacco hx:  [ ] YES  [x ] NO             Alcohol hx: [ ] YES  [x ] NO        Home Opioid hx:  [ ] YES  [ x] NO   Living Situation: [ ] Home  [ ] Long term care  [x ] Rehab    REFERRALS:   [ ] Chaplaincy  [ ] Hospice  [ ] Child Life  [ ] Social Work  [ ] Case management [ ] Holistic Therapy     FAMILY HISTORY:  Family history of hypertension (Sibling): sister  Family history of diabetes mellitus (Sibling): brothers/sisters    BASELINE ADLs (prior to admission):  Independent [ ] moderately [ ] fully   Dependent   [ x] moderately [ ] fully    ADVANCE DIRECTIVES:  [ ] YES [x ] NO   DNR [ ] YES [x ] NO                      MOLST  [ ] YES [ x] NO    Living Will  [ ] YES [ ] NO    Health Care Proxy [ ] YES  [ ] NO      [ ] Surrogate  [x ] HCP  [ ] Guardian: Elsa Caden- needs official documentation        Phone#: [phone]    Allergies    azithromycin (Hives; Pruritus)    Intolerances    MEDICATIONS  (STANDING):  aspirin enteric coated 81 milliGRAM(s) Oral daily  atorvastatin 40 milliGRAM(s) Oral at bedtime  chlorhexidine 2% Cloths 1 Application(s) Topical daily  clopidogrel Tablet 75 milliGRAM(s) Oral daily  dextrose 5%. 1000 milliLiter(s) (50 mL/Hr) IV Continuous <Continuous>  dextrose 50% Injectable 12.5 Gram(s) IV Push once  dextrose 50% Injectable 25 Gram(s) IV Push once  dextrose 50% Injectable 25 Gram(s) IV Push once  docusate sodium 100 milliGRAM(s) Oral two times a day  furosemide   Injectable 40 milliGRAM(s) IV Push two times a day  hydrALAZINE 50 milliGRAM(s) Oral every 8 hours  insulin glargine Injectable (LANTUS) 28 Unit(s) SubCutaneous at bedtime  insulin lispro (HumaLOG) corrective regimen sliding scale   SubCutaneous three times a day before meals  insulin lispro (HumaLOG) corrective regimen sliding scale   SubCutaneous at bedtime  insulin lispro Injectable (HumaLOG) 11 Unit(s) SubCutaneous three times a day before meals  isosorbide   dinitrate Tablet (ISORDIL) 10 milliGRAM(s) Oral three times a day  levothyroxine 50 MICROGram(s) Oral daily  lidocaine   Patch 1 Patch Transdermal daily  melatonin 3 milliGRAM(s) Oral at bedtime  montelukast 10 milliGRAM(s) Oral daily  pantoprazole    Tablet 40 milliGRAM(s) Oral before breakfast  polyethylene glycol 3350 17 Gram(s) Oral daily  senna 2 Tablet(s) Oral at bedtime    MEDICATIONS  (PRN):  acetaminophen   Tablet .. 650 milliGRAM(s) Oral every 6 hours PRN Mild Pain (1 - 3), Moderate Pain (4 - 6)  dextrose 40% Gel 15 Gram(s) Oral once PRN Blood Glucose LESS THAN 70 milliGRAM(s)/deciliter  glucagon  Injectable 1 milliGRAM(s) IntraMuscular once PRN Glucose LESS THAN 70 milligrams/deciliter      PRESENT SYMPTOMS:  Source: [ x] Patient   [x ] Family   [ ] Team     Pain: [x ] YES [ ] NO (neck)  OLDCARTS:     Dyspnea on exertion: [ x] YES [ ] NO   SOB: [ ] YES [x ] NO  Anxiety: [x ] YES [ ] NO  Fatigue: [x ] YES [ ] NO   Nausea: [ ] YES [x ] NO  Loss of appetite: [ ] YES [x ] NO   Constipation: [ ] YES [ x] NO     Other Symptoms:  [ ] All other review of systems negative   [ ] Unable to obtain due to poor mentation     Karnofsky Performance Score/Palliative Performance Status Version 2:         %  Protein Calorie Malutrition:  [x ] Mild   [ ] Moderate   [ ] Severe     Vital Signs Last 24 Hrs  T(C): 37.1 (17 Jul 2019 04:26), Max: 37.2 (16 Jul 2019 20:38)  T(F): 98.8 (17 Jul 2019 04:26), Max: 98.9 (16 Jul 2019 20:38)  HR: 67 (17 Jul 2019 04:26) (67 - 75)  BP: 109/61 (17 Jul 2019 04:26) (100/56 - 109/61)  BP(mean): --  RR: 18 (17 Jul 2019 04:26) (18 - 19)  SpO2: 100% (17 Jul 2019 04:26) (98% - 100%)    Physical Exam:    General: [ x] Alert,  A&O x 2    [ ] lethargic   [ ] Agitated   [ ] Cachexia   HEENT: [x ] Normal   [ ] Dry mouth   [ ] ET Tube    [ ] Trach   Lungs: [x ] Clear [ ] Rhonchi  [ ] Crackles [ ] Wheezing [ ] Tachypnea  [ ] Audible excessive secretions   Cardiovascular:  [x ] Regular rate and rhythm  [ ] Irregular [ ] Tachycardia   [ ] Bradycardia   Abdomen: [x ] Soft  [ ] Distended  [ ]  [x ] +BS  [x ] Non tender [ ] Tender  [ ]PEG   [ ] NGT   Last BM:     Genitourinary: [x ] Normal [ ] Incontinent   [ ] Oliguria/Anuria   [ ] Oscar  Musculoskeletal:  [ ] Normal   [x ] Generalized weakness  [ ] Bedbound   Neurological: [x ] No focal deficits  [ ] Cognitive impairment     Skin: [ x] Normal   [ ] Pressure ulcers     LABS:                        7.5    8.22  )-----------( 284      ( 17 Jul 2019 09:19 )             24.3     07-17    133<L>  |  98  |  70<H>  ----------------------------<  286<H>  4.7   |  21<L>  |  2.02<H>    Ca    9.3      17 Jul 2019 06:35  Phos  4.1     07-16  Mg     2.3     07-16    TPro  7.4  /  Alb  3.5  /  TBili  0.2  /  DBili  x   /  AST  30  /  ALT  131<H>  /  AlkPhos  133<H>  07-16        I&O's Summary    16 Jul 2019 07:01  -  17 Jul 2019 07:00  --------------------------------------------------------  IN: 120 mL / OUT: 1500 mL / NET: -1380 mL    17 Jul 2019 07:01  -  17 Jul 2019 11:50  --------------------------------------------------------  IN: 240 mL / OUT: 400 mL / NET: -160 mL HPI:  Pt is a 72 yo woman with PMHx of HTN, T2DM, CAD s/p CABG (LIMA to LAD, SVG to OM, SVG to PDA 2014 at [facility]), non-dilated ICM (EF 20-25%), severe mitral regurgitation s/p mitral clip (6/13/19 Dr. Newman), severe pulm HTN, CKD, hypothyroidism, who is [facility] ED [facility]. Also complaining of intermittent chest pain. Of note, pt was recently discharged on 6/19 after having mitral clip procedure completed. Pt says that she has been experiencing SOB for about 1 week. However, she was never noted to be hypoxic in rehab until today. She was not able to provide much hx 2/2 SOB and being on bipap. Daughter at bedside reporting that pt was well immediately after discharge. However, she gradually developed worsening SOB. Better with rest initially. Nothing alleviating SOB now. It is constant throughout the day, made worse with exertion. No fevers, chills, nausea, vomiting, cough, sputum production, chest tightness, pressure, palpitations, LOC, syncope.    In the ED, pt afebrile, , /75, RR 22, O2 100% on 2L NC. She subsequently developed worsened work of breathing and was placed on bipap with improvement. She was given , lasix 40mg IVP x1. CXR significant for pulmonary edema. Anemia to 9.3/28.5. BUN/Cr 42/1.72 (54/2.54 on 6/19). BNP 6626. VBG 7.49/29/61/22. TTE pending. (05 Jul 2019 19:02)    Since being admitted she underwent aggressive diuresis w/ lasix gtt and breathing treatments in the CCU for acute decompensated heart failure. Required pressure support with inotropes and  vasopressors for a short amount of time and subsequently transferred to Medicine floor. Now on lasix 40mg IV BID with inhalers for underlying asthma. NYHA Class IIIb symptoms with improvement in SCr, but overall still hypervolemic. For this reason, has not been transitioned to PO lasix yet.     Palliative care team consulted for GOC. Pt very exhausted this AM. Closing her eyes throughout conversation. Says she feels very physically tired throughout the day. Also having general worrying, unclear if 2/2 general medical issues vs. not being at home. She reports having adequate appetite and had some foods at her bedside table. Has urinary and fecal incontinence. Needs assistance with basic needs such repositioning and eating 2/2 overall weakness and deconditioning. Currently without pain. Using Lidoderm patch for neck pain.    Spoke on the phone with daughter Elsa. She has been involved with mother's care throughout hospitalization. says her mother's main symptoms include lethargy, intermittent SOB and neck pain. Reassurance given that symptoms will be managed appropriately. Explored with Elsa [facility] and at this time she would overall want pt to be back home with family. Is interested in exploring DNR/DNI option, but would like to meet tomorrow to speak more about it. Says that she wants her mother to be comfortable, and most likely would not want aggressive or invasive therapies for her chronic illnesses.     PERTINENT PMH REVIEWED:  [ x] YES [ ] NO           SOCIAL HISTORY:  Significant other/partner:  [ ] YES  [x ] NO            Children:  [x] YES  [ ] NO                   Mandaeism/Spirituality: Judaism  Substance hx:  [ ] YES   [x ] NO           Tobacco hx:  [ ] YES  [x ] NO             Alcohol hx: [ ] YES  [x ] NO        Home Opioid hx:  [ ] YES  [ x] NO   Living Situation: [ ] Home  [ ] Long term care  [x ] Rehab    REFERRALS:   [ ] Chaplaincy  [ ] Hospice  [ ] Child Life  [ ] Social Work  [ ] Case management [ ] Holistic Therapy     FAMILY HISTORY:  Family history of hypertension (Sibling): sister  Family history of diabetes mellitus (Sibling): brothers/sisters    BASELINE ADLs (prior to admission):  Independent [ ] moderately [ ] fully   Dependent   [ x] moderately [ ] fully    ADVANCE DIRECTIVES:  [ ] YES [x ] NO   DNR [ ] YES [x ] NO                      MOLST  [ ] YES [ x] NO    Living Will  [ ] YES [ ] NO    Health Care Proxy [ ] YES  [ ] NO      [ ] Surrogate  [x ] HCP  [ ] Guardian: Elsa Caden- needs official documentation        Phone#: [phone]    Allergies    azithromycin (Hives; Pruritus)    Intolerances    MEDICATIONS  (STANDING):  aspirin enteric coated 81 milliGRAM(s) Oral daily  atorvastatin 40 milliGRAM(s) Oral at bedtime  chlorhexidine 2% Cloths 1 Application(s) Topical daily  clopidogrel Tablet 75 milliGRAM(s) Oral daily  dextrose 5%. 1000 milliLiter(s) (50 mL/Hr) IV Continuous <Continuous>  dextrose 50% Injectable 12.5 Gram(s) IV Push once  dextrose 50% Injectable 25 Gram(s) IV Push once  dextrose 50% Injectable 25 Gram(s) IV Push once  docusate sodium 100 milliGRAM(s) Oral two times a day  furosemide   Injectable 40 milliGRAM(s) IV Push two times a day  hydrALAZINE 50 milliGRAM(s) Oral every 8 hours  insulin glargine Injectable (LANTUS) 28 Unit(s) SubCutaneous at bedtime  insulin lispro (HumaLOG) corrective regimen sliding scale   SubCutaneous three times a day before meals  insulin lispro (HumaLOG) corrective regimen sliding scale   SubCutaneous at bedtime  insulin lispro Injectable (HumaLOG) 11 Unit(s) SubCutaneous three times a day before meals  isosorbide   dinitrate Tablet (ISORDIL) 10 milliGRAM(s) Oral three times a day  levothyroxine 50 MICROGram(s) Oral daily  lidocaine   Patch 1 Patch Transdermal daily  melatonin 3 milliGRAM(s) Oral at bedtime  montelukast 10 milliGRAM(s) Oral daily  pantoprazole    Tablet 40 milliGRAM(s) Oral before breakfast  polyethylene glycol 3350 17 Gram(s) Oral daily  senna 2 Tablet(s) Oral at bedtime    MEDICATIONS  (PRN):  acetaminophen   Tablet .. 650 milliGRAM(s) Oral every 6 hours PRN Mild Pain (1 - 3), Moderate Pain (4 - 6)  dextrose 40% Gel 15 Gram(s) Oral once PRN Blood Glucose LESS THAN 70 milliGRAM(s)/deciliter  glucagon  Injectable 1 milliGRAM(s) IntraMuscular once PRN Glucose LESS THAN 70 milligrams/deciliter      PRESENT SYMPTOMS:  Source: [ x] Patient   [x ] Family   [ ] Team     Pain: [x ] YES [ ] NO (neck)  OLDCARTS:     Dyspnea on exertion: [ x] YES [ ] NO   SOB: [ ] YES [x ] NO  Anxiety: [x ] YES [ ] NO  Fatigue: [x ] YES [ ] NO   Nausea: [ ] YES [x ] NO  Loss of appetite: [ ] YES [x ] NO   Constipation: [ ] YES [ x] NO     Other Symptoms:  [ ] All other review of systems negative   [ ] Unable to obtain due to poor mentation     Karnofsky Performance Score/Palliative Performance Status Version 2:         %  Protein Calorie Malutrition:  [x ] Mild   [ ] Moderate   [ ] Severe     Vital Signs Last 24 Hrs  T(C): 37.1 (17 Jul 2019 04:26), Max: 37.2 (16 Jul 2019 20:38)  T(F): 98.8 (17 Jul 2019 04:26), Max: 98.9 (16 Jul 2019 20:38)  HR: 67 (17 Jul 2019 04:26) (67 - 75)  BP: 109/61 (17 Jul 2019 04:26) (100/56 - 109/61)  BP(mean): --  RR: 18 (17 Jul 2019 04:26) (18 - 19)  SpO2: 100% (17 Jul 2019 04:26) (98% - 100%)    Physical Exam:    General: [ x] Alert,  A&O x 2    [ ] lethargic   [ ] Agitated   [ ] Cachexia   HEENT: [x ] Normal   [ ] Dry mouth   [ ] ET Tube    [ ] Trach   Lungs: [x ] Clear [ ] Rhonchi  [ ] Crackles [ ] Wheezing [ ] Tachypnea  [ ] Audible excessive secretions   Cardiovascular:  [x ] Regular rate and rhythm  [ ] Irregular [ ] Tachycardia   [ ] Bradycardia   Abdomen: [x ] Soft  [ ] Distended  [ ]  [x ] +BS  [x ] Non tender [ ] Tender  [ ]PEG   [ ] NGT   Last BM:     Genitourinary: [x ] Normal [ ] Incontinent   [ ] Oliguria/Anuria   [ ] Oscar  Musculoskeletal:  [ ] Normal   [x ] Generalized weakness  [ ] Bedbound   Neurological: [x ] No focal deficits  [ ] Cognitive impairment     Skin: [ x] Normal   [ ] Pressure ulcers     LABS:                        7.5    8.22  )-----------( 284      ( 17 Jul 2019 09:19 )             24.3     07-17    133<L>  |  98  |  70<H>  ----------------------------<  286<H>  4.7   |  21<L>  |  2.02<H>    Ca    9.3      17 Jul 2019 06:35  Phos  4.1     07-16  Mg     2.3     07-16    TPro  7.4  /  Alb  3.5  /  TBili  0.2  /  DBili  x   /  AST  30  /  ALT  131<H>  /  AlkPhos  133<H>  07-16        I&O's Summary    16 Jul 2019 07:01  -  17 Jul 2019 07:00  --------------------------------------------------------  IN: 120 mL / OUT: 1500 mL / NET: -1380 mL    17 Jul 2019 07:01  -  17 Jul 2019 11:50  --------------------------------------------------------  IN: 240 mL / OUT: 400 mL / NET: -160 mL

## 2019-07-17 NOTE — PROGRESS NOTE ADULT - PROBLEM SELECTOR PLAN 4
- Currently SR  - Continue to monitor on tele - Currently SR  - Continue to monitor on tele  - Recheck digoxin level on Friday 7/19 as it was critically high on 7/16

## 2019-07-17 NOTE — CONSULT NOTE ADULT - PROBLEM SELECTOR RECOMMENDATION 5
No spouse reported  In the event of incapacity, all three children will be of equivalent weight since there is no HCP form [facility]  The patient reports two sons

## 2019-07-17 NOTE — CONSULT NOTE ADULT - PROBLEM SELECTOR RECOMMENDATION 9
Moderate  to severe lethargy  Immobility and consequent lethargy  APAP trial and lidoderm for back pain  APAP 3 gm max

## 2019-07-17 NOTE — PROGRESS NOTE ADULT - PROBLEM SELECTOR PLAN 1
- Continue ISDN 10 mg PO TID, hold for SBP < 90  - Consider increasing lasix to 60 mg IV BID  - Continue hydralazine 50mg PO TID, hold for SBP < 90  - No ARB/ARNI/MRA at this time due to renal dysfunction  - Will hold on initiation of beta block until euvolemic  - Daily standing weights, strict I &Os.  - Please check room air and ambulatory O2 Sat to evaluate need for O2 - Continue ISDN 20 mg PO TID, hold for SBP < 90  - Please increase lasix to 60 mg IV BID  - Continue hydralazine 50mg PO TID, hold for SBP < 90  - No ARB/ARNI/MRA at this time due to renal dysfunction  - Will hold on initiation of beta block until euvolemic  - Daily standing weights, strict I &Os. - Please increase ISDN to 20 mg PO TID, hold for SBP < 90  - Please increase lasix to 60 mg IV BID  - Continue hydralazine 50mg PO TID, hold for SBP < 90  - No ARB/ARNI/MRA at this time due to renal dysfunction  - Will hold on initiation of beta block until euvolemic  - Daily standing weights, strict I &Os.

## 2019-07-17 NOTE — PROGRESS NOTE ADULT - ASSESSMENT
Pt is a 74 yo woman with PMHx of HTN, T2DM, CAD s/p CABG (LIMA to LAD, SVG to OM, SVG to PDA 2014 at [facility]), non-dilated ICM (EF 20-25%), severe mitral regurgitation s/p mitral clip (6/13/19 Dr. Newman), severe pulm HTN, CKD, hypothyroidism, who is presenting to ED after experiencing worsening SOB      A/P:      MERVIN  MERVIN sec to cardiogenic shock  Renal function improving  PT non -oliguric  On lasix 40mg IV BID  Optimize glucose control  Monitor renal function   Avoid further nephrotoxics, NSAIDS RCA    CKD stage 4:  baseline Scr 1.8-2.0  Renal function fluctuates sec to CHF  Monitor renal function at present    SOB:  in setting of HF  COntinue diuretics per cardiology    ACidosis   sec to RF  mOnitor serum Co2 at present

## 2019-07-18 LAB
ANION GAP SERPL CALC-SCNC: 18 MMOL/L — HIGH (ref 5–17)
BUN SERPL-MCNC: 74 MG/DL — HIGH (ref 7–23)
CALCIUM SERPL-MCNC: 9.6 MG/DL — SIGNIFICANT CHANGE UP (ref 8.4–10.5)
CHLORIDE SERPL-SCNC: 96 MMOL/L — SIGNIFICANT CHANGE UP (ref 96–108)
CO2 SERPL-SCNC: 20 MMOL/L — LOW (ref 22–31)
CREAT SERPL-MCNC: 1.92 MG/DL — HIGH (ref 0.5–1.3)
GLUCOSE BLDC GLUCOMTR-MCNC: 310 MG/DL — HIGH (ref 70–99)
GLUCOSE BLDC GLUCOMTR-MCNC: 327 MG/DL — HIGH (ref 70–99)
GLUCOSE BLDC GLUCOMTR-MCNC: 345 MG/DL — HIGH (ref 70–99)
GLUCOSE BLDC GLUCOMTR-MCNC: 370 MG/DL — HIGH (ref 70–99)
GLUCOSE BLDC GLUCOMTR-MCNC: 395 MG/DL — HIGH (ref 70–99)
GLUCOSE BLDC GLUCOMTR-MCNC: 402 MG/DL — HIGH (ref 70–99)
GLUCOSE SERPL-MCNC: 296 MG/DL — HIGH (ref 70–99)
HCT VFR BLD CALC: 24.2 % — LOW (ref 34.5–45)
HGB BLD-MCNC: 7.9 G/DL — LOW (ref 11.5–15.5)
MCHC RBC-ENTMCNC: 27.5 PG — SIGNIFICANT CHANGE UP (ref 27–34)
MCHC RBC-ENTMCNC: 32.6 GM/DL — SIGNIFICANT CHANGE UP (ref 32–36)
MCV RBC AUTO: 84.6 FL — SIGNIFICANT CHANGE UP (ref 80–100)
PLATELET # BLD AUTO: 349 K/UL — SIGNIFICANT CHANGE UP (ref 150–400)
POTASSIUM SERPL-MCNC: 4.1 MMOL/L — SIGNIFICANT CHANGE UP (ref 3.5–5.3)
POTASSIUM SERPL-SCNC: 4.1 MMOL/L — SIGNIFICANT CHANGE UP (ref 3.5–5.3)
RBC # BLD: 2.86 M/UL — LOW (ref 3.8–5.2)
RBC # FLD: 17.2 % — HIGH (ref 10.3–14.5)
SODIUM SERPL-SCNC: 134 MMOL/L — LOW (ref 135–145)
WBC # BLD: 8.4 K/UL — SIGNIFICANT CHANGE UP (ref 3.8–10.5)
WBC # FLD AUTO: 8.4 K/UL — SIGNIFICANT CHANGE UP (ref 3.8–10.5)

## 2019-07-18 PROCEDURE — 93010 ELECTROCARDIOGRAM REPORT: CPT

## 2019-07-18 RX ADMIN — PANTOPRAZOLE SODIUM 40 MILLIGRAM(S): 20 TABLET, DELAYED RELEASE ORAL at 05:07

## 2019-07-18 RX ADMIN — Medication 60 MILLIGRAM(S): at 06:05

## 2019-07-18 RX ADMIN — Medication 50 MICROGRAM(S): at 05:07

## 2019-07-18 RX ADMIN — LIDOCAINE 1 PATCH: 4 CREAM TOPICAL at 08:24

## 2019-07-18 RX ADMIN — POLYETHYLENE GLYCOL 3350 17 GRAM(S): 17 POWDER, FOR SOLUTION ORAL at 12:21

## 2019-07-18 RX ADMIN — ISOSORBIDE DINITRATE 20 MILLIGRAM(S): 5 TABLET ORAL at 21:25

## 2019-07-18 RX ADMIN — ATORVASTATIN CALCIUM 40 MILLIGRAM(S): 80 TABLET, FILM COATED ORAL at 21:25

## 2019-07-18 RX ADMIN — Medication 11 UNIT(S): at 08:23

## 2019-07-18 RX ADMIN — Medication 50 MILLIGRAM(S): at 21:25

## 2019-07-18 RX ADMIN — LIDOCAINE 1 PATCH: 4 CREAM TOPICAL at 09:00

## 2019-07-18 RX ADMIN — Medication 4: at 16:56

## 2019-07-18 RX ADMIN — LIDOCAINE 1 PATCH: 4 CREAM TOPICAL at 21:26

## 2019-07-18 RX ADMIN — Medication 100 MILLIGRAM(S): at 05:07

## 2019-07-18 RX ADMIN — Medication 3 MILLIGRAM(S): at 21:25

## 2019-07-18 RX ADMIN — MONTELUKAST 10 MILLIGRAM(S): 4 TABLET, CHEWABLE ORAL at 12:17

## 2019-07-18 RX ADMIN — Medication 5: at 12:19

## 2019-07-18 RX ADMIN — Medication 3: at 21:26

## 2019-07-18 RX ADMIN — ISOSORBIDE DINITRATE 20 MILLIGRAM(S): 5 TABLET ORAL at 05:07

## 2019-07-18 RX ADMIN — CLOPIDOGREL BISULFATE 75 MILLIGRAM(S): 75 TABLET, FILM COATED ORAL at 12:22

## 2019-07-18 RX ADMIN — Medication 60 MILLIGRAM(S): at 18:37

## 2019-07-18 RX ADMIN — Medication 4: at 08:23

## 2019-07-18 RX ADMIN — Medication 81 MILLIGRAM(S): at 12:17

## 2019-07-18 RX ADMIN — Medication 11 UNIT(S): at 12:19

## 2019-07-18 RX ADMIN — Medication 50 MILLIGRAM(S): at 05:07

## 2019-07-18 RX ADMIN — INSULIN GLARGINE 28 UNIT(S): 100 INJECTION, SOLUTION SUBCUTANEOUS at 21:25

## 2019-07-18 RX ADMIN — SENNA PLUS 2 TABLET(S): 8.6 TABLET ORAL at 21:25

## 2019-07-18 RX ADMIN — ISOSORBIDE DINITRATE 20 MILLIGRAM(S): 5 TABLET ORAL at 14:36

## 2019-07-18 RX ADMIN — Medication 50 MILLIGRAM(S): at 14:36

## 2019-07-18 RX ADMIN — Medication 11 UNIT(S): at 16:57

## 2019-07-18 NOTE — PROGRESS NOTE ADULT - SUBJECTIVE AND OBJECTIVE BOX
Infectious Diseases progress note:    Subjective:  AFebrile.  No leukocytosis.  Cr improving.  Pt c/o feeling tired.  Daughter at bedside.      ROS:  CONSTITUTIONAL:  No fever, chills, rigors  CARDIOVASCULAR:  No chest pain or palpitations  RESPIRATORY:   No SOB, cough, dyspnea on exertion.  No wheezing  GASTROINTESTINAL:  No abd pain, N/V, diarrhea/constipation  EXTREMITIES:  No swelling or joint pain  GENITOURINARY:  No burning on urination, increased frequency or urgency.  No flank pain  NEUROLOGIC:  No HA, visual disturbances  SKIN: No rashes    Allergies    azithromycin (Hives; Pruritus)    Intolerances        ANTIBIOTICS/RELEVANT:  antimicrobials    immunologic:    OTHER:  acetaminophen   Tablet .. 650 milliGRAM(s) Oral every 6 hours PRN  aspirin enteric coated 81 milliGRAM(s) Oral daily  atorvastatin 40 milliGRAM(s) Oral at bedtime  chlorhexidine 2% Cloths 1 Application(s) Topical daily  clopidogrel Tablet 75 milliGRAM(s) Oral daily  dextrose 40% Gel 15 Gram(s) Oral once PRN  dextrose 5%. 1000 milliLiter(s) IV Continuous <Continuous>  dextrose 50% Injectable 12.5 Gram(s) IV Push once  dextrose 50% Injectable 25 Gram(s) IV Push once  dextrose 50% Injectable 25 Gram(s) IV Push once  docusate sodium 100 milliGRAM(s) Oral two times a day  furosemide   Injectable 60 milliGRAM(s) IV Push two times a day  glucagon  Injectable 1 milliGRAM(s) IntraMuscular once PRN  hydrALAZINE 50 milliGRAM(s) Oral every 8 hours  insulin glargine Injectable (LANTUS) 28 Unit(s) SubCutaneous at bedtime  insulin lispro (HumaLOG) corrective regimen sliding scale   SubCutaneous three times a day before meals  insulin lispro (HumaLOG) corrective regimen sliding scale   SubCutaneous at bedtime  insulin lispro Injectable (HumaLOG) 11 Unit(s) SubCutaneous three times a day before meals  isosorbide   dinitrate Tablet (ISORDIL) 20 milliGRAM(s) Oral three times a day  levothyroxine 50 MICROGram(s) Oral daily  lidocaine   Patch 1 Patch Transdermal daily  melatonin 3 milliGRAM(s) Oral at bedtime  montelukast 10 milliGRAM(s) Oral daily  pantoprazole    Tablet 40 milliGRAM(s) Oral before breakfast  polyethylene glycol 3350 17 Gram(s) Oral daily  senna 2 Tablet(s) Oral at bedtime      Objective:  Vital Signs Last 24 Hrs  T(C): 36.6 (18 Jul 2019 19:58), Max: 36.8 (18 Jul 2019 04:31)  T(F): 97.9 (18 Jul 2019 19:58), Max: 98.3 (18 Jul 2019 04:31)  HR: 69 (18 Jul 2019 21:24) (69 - 77)  BP: 114/62 (18 Jul 2019 21:24) (101/56 - 119/63)  BP(mean): --  RR: 18 (18 Jul 2019 19:58) (18 - 18)  SpO2: 100% (18 Jul 2019 19:58) (99% - 100%)    PHYSICAL EXAM:  Constitutional:NAD  Eyes:MOHSEN, EOMI  Ear/Nose/Throat: no thrush, mucositis.  Moist mucous membranes	  Neck:no JVD, no lymphadenopathy, supple  Respiratory: CTA maribel  Cardiovascular: S1S2 RRR, no murmurs  Gastrointestinal:soft, nontender,  nondistended (+) BS  Extremities:no e/e/c  Skin:  no rashes, open wounds or ulcerations        LABS:                        7.9    8.4   )-----------( 349      ( 18 Jul 2019 05:13 )             24.2     07-18    134<L>  |  96  |  74<H>  ----------------------------<  296<H>  4.1   |  20<L>  |  1.92<H>    Ca    9.6      18 Jul 2019 05:13                  MICROBIOLOGY:    Culture - Blood (07.09.19 @ 17:46)    Specimen Source: .Blood    Culture Results:   No growth at 5 days.          RADIOLOGY & ADDITIONAL STUDIES:    < from: Xray Chest 1 View- PORTABLE-Routine (07.12.19 @ 10:55) >  EXAM:  XR CHEST PORTABLE ROUTINE 1V                            PROCEDURE DATE:  07/12/2019            INTERPRETATION:  CLINICAL INFORMATION: [place] position check in   patient with acute decompensated heart failure.    TECHNIQUE: AP radiograph of the chest.    COMPARISON: Chest x-ray dated 7/11/2019.    FINDINGS:    LINES/TUBES: [place] catheter tip in right pulmonary artery, appears   slightly advanced from prior radiograph.    LUNGS: The lungs are clear.    PLEURA: No pleural effusion orpneumothorax.    HEART AND MEDIASTINUM: Cardiac silhouette appears enlarged, however   difficult to appreciate in the setting of AP radiograph. Status post   sternotomy. There is a surgical clip overlying the upper mid thorax.    SKELETON: No acute osseous abnormality..      IMPRESSION:   1.  Interval marginal advancement of the [place] catheter, which   remains located in the right pulmonary artery.  2.  Clear lungs.    < end of copied text >

## 2019-07-18 NOTE — PROGRESS NOTE ADULT - SUBJECTIVE AND OBJECTIVE BOX
CHIEF COMPLAINT:    SUBJECTIVE:     REVIEW OF SYSTEMS:    CONSTITUTIONAL: (  )  weakness,  (  ) fevers or chills  EYES/ENT: (  )visual changes;     NECK: (  ) pain or stiffness  RESPIRATORY:   (  )cough, wheezing, hemoptysis;  (  ) shortness of breath  CARDIOVASCULAR:  (  )chest pain or palpitations  GASTROINTESTINAL:   (  )abdominal or epigastric pain.  (  ) nausea, vomiting, or hematemesis;   (   ) diarrhea or constipation.   GENITOURINARY:   (    ) dysuria, frequency or hematuria  NEUROLOGICAL:  (   ) numbness or weakness   All other review of systems is negative unless indicated above    Vital Signs Last 24 Hrs  T(C): 36.8 (18 Jul 2019 05:57), Max: 37.1 (17 Jul 2019 17:03)  T(F): 98.3 (18 Jul 2019 05:57), Max: 98.7 (17 Jul 2019 17:03)  HR: 76 (18 Jul 2019 06:08) (68 - 77)  BP: 105/62 (18 Jul 2019 06:08) (100/56 - 119/63)  BP(mean): --  RR: 18 (18 Jul 2019 06:08) (17 - 18)  SpO2: 99% (18 Jul 2019 06:08) (96% - 100%)    I&O's Summary    17 Jul 2019 07:01  -  18 Jul 2019 07:00  --------------------------------------------------------  IN: 490 mL / OUT: 1050 mL / NET: -560 mL        CAPILLARY BLOOD GLUCOSE      POCT Blood Glucose.: 345 mg/dL (18 Jul 2019 07:47)  POCT Blood Glucose.: 327 mg/dL (18 Jul 2019 06:19)  POCT Blood Glucose.: 360 mg/dL (17 Jul 2019 21:09)  POCT Blood Glucose.: 282 mg/dL (17 Jul 2019 16:41)  POCT Blood Glucose.: 326 mg/dL (17 Jul 2019 12:17)      PHYSICAL EXAM:    Constitutional:  (   ) NAD,   (   )awake and alert  HEENT: PERR, EOMI,    Neck: Soft and supple, No LAD, No JVD  Respiratory:  (    Breath sounds are clear bilaterally,    (   ) wheezing, rales or rhonchi  Cardiovascular:     (   )S1 and S2, regular rate and rhythm, no Murmurs, gallops or rubs  Gastrointestinal:  (   )Bowel Sounds present, soft,   (  )nontender, nondistended,    Extremities:    (  ) peripheral edema  Vascular: 2+ peripheral pulses  Neurological:    (    )A/O x 3,   (  ) focal deficits  Musculoskeletal:    (   )  normal strength b/l upper  (     ) normal  lower extremities  Skin: No rashes    MEDICATIONS:  MEDICATIONS  (STANDING):  aspirin enteric coated 81 milliGRAM(s) Oral daily  atorvastatin 40 milliGRAM(s) Oral at bedtime  chlorhexidine 2% Cloths 1 Application(s) Topical daily  clopidogrel Tablet 75 milliGRAM(s) Oral daily  dextrose 5%. 1000 milliLiter(s) (50 mL/Hr) IV Continuous <Continuous>  dextrose 50% Injectable 12.5 Gram(s) IV Push once  dextrose 50% Injectable 25 Gram(s) IV Push once  dextrose 50% Injectable 25 Gram(s) IV Push once  docusate sodium 100 milliGRAM(s) Oral two times a day  furosemide   Injectable 60 milliGRAM(s) IV Push two times a day  hydrALAZINE 50 milliGRAM(s) Oral every 8 hours  insulin glargine Injectable (LANTUS) 28 Unit(s) SubCutaneous at bedtime  insulin lispro (HumaLOG) corrective regimen sliding scale   SubCutaneous three times a day before meals  insulin lispro (HumaLOG) corrective regimen sliding scale   SubCutaneous at bedtime  insulin lispro Injectable (HumaLOG) 11 Unit(s) SubCutaneous three times a day before meals  isosorbide   dinitrate Tablet (ISORDIL) 20 milliGRAM(s) Oral three times a day  levothyroxine 50 MICROGram(s) Oral daily  lidocaine   Patch 1 Patch Transdermal daily  melatonin 3 milliGRAM(s) Oral at bedtime  montelukast 10 milliGRAM(s) Oral daily  pantoprazole    Tablet 40 milliGRAM(s) Oral before breakfast  polyethylene glycol 3350 17 Gram(s) Oral daily  senna 2 Tablet(s) Oral at bedtime      LABS: All Labs Reviewed:                        7.9    8.4   )-----------( 349      ( 18 Jul 2019 05:13 )             24.2     07-18    134<L>  |  96  |  74<H>  ----------------------------<  296<H>  4.1   |  20<L>  |  1.92<H>    Ca    9.6      18 Jul 2019 05:13            Blood Culture:   Urine Culture      RADIOLOGY/EKG:    ASSESSMENT AND PLAN:    DVT PPX:    ADVANCED DIRECTIVE:    DISPOSITION: CHIEF COMPLAINT: SOB   - Reports feeling  better today. Continues to reports generalized weakness and fatigue.  - Notes orthopnea. Has not been OOB yet today. Denies SOB at rest, CP, palpitations, lightheadedness, dizziness    SUBJECTIVE:     REVIEW OF SYSTEMS:    CONSTITUTIONAL: ( x )  weakness,  (  ) fevers or chills  EYES/ENT: (  )visual changes;     NECK: (  ) pain or stiffness  RESPIRATORY:   (  )cough, wheezing, hemoptysis;  (  ) shortness of breath  CARDIOVASCULAR:  (  )chest pain or palpitations  GASTROINTESTINAL:   ( x )abdominal or epigastric pain.  (  ) nausea, vomiting, or hematemesis;   (   ) diarrhea or constipation.   GENITOURINARY:   (    ) dysuria, frequency or hematuria  NEUROLOGICAL:  (   ) numbness or weakness   All other review of systems is negative unless indicated above    Vital Signs Last 24 Hrs  T(C): 36.8 (18 Jul 2019 05:57), Max: 37.1 (17 Jul 2019 17:03)  T(F): 98.3 (18 Jul 2019 05:57), Max: 98.7 (17 Jul 2019 17:03)  HR: 76 (18 Jul 2019 06:08) (68 - 77)  BP: 105/62 (18 Jul 2019 06:08) (100/56 - 119/63)  BP(mean): --  RR: 18 (18 Jul 2019 06:08) (17 - 18)  SpO2: 99% (18 Jul 2019 06:08) (96% - 100%)    I&O's Summary    17 Jul 2019 07:01  -  18 Jul 2019 07:00  --------------------------------------------------------  IN: 490 mL / OUT: 1050 mL / NET: -560 mL        CAPILLARY BLOOD GLUCOSE      POCT Blood Glucose.: 345 mg/dL (18 Jul 2019 07:47)  POCT Blood Glucose.: 327 mg/dL (18 Jul 2019 06:19)  POCT Blood Glucose.: 360 mg/dL (17 Jul 2019 21:09)  POCT Blood Glucose.: 282 mg/dL (17 Jul 2019 16:41)  POCT Blood Glucose.: 326 mg/dL (17 Jul 2019 12:17)      PHYSICAL EXAM:  General: No distress. Comfortable.  HEENT: EOM intact.  Neck: Neck supple. JVP 10-12 elevated. No masses  Chest: BL posterior lower lobe rales  CV: RRR, Normal S1 and S2. No murmurs, rub, or gallops. Radial pulses normal. No LE edema  Abdomen: Soft, mildly distended, non-tender  Skin: No rashes or skin breakdown  Neurology: Alert and oriented times three. Sensation intact  Psych:  depresses      MEDICATIONS:  MEDICATIONS  (STANDING):  aspirin enteric coated 81 milliGRAM(s) Oral daily  atorvastatin 40 milliGRAM(s) Oral at bedtime  chlorhexidine 2% Cloths 1 Application(s) Topical daily  clopidogrel Tablet 75 milliGRAM(s) Oral daily  dextrose 5%. 1000 milliLiter(s) (50 mL/Hr) IV Continuous <Continuous>  dextrose 50% Injectable 12.5 Gram(s) IV Push once  dextrose 50% Injectable 25 Gram(s) IV Push once  dextrose 50% Injectable 25 Gram(s) IV Push once  docusate sodium 100 milliGRAM(s) Oral two times a day  furosemide   Injectable 60 milliGRAM(s) IV Push two times a day  hydrALAZINE 50 milliGRAM(s) Oral every 8 hours  insulin glargine Injectable (LANTUS) 28 Unit(s) SubCutaneous at bedtime  insulin lispro (HumaLOG) corrective regimen sliding scale   SubCutaneous three times a day before meals  insulin lispro (HumaLOG) corrective regimen sliding scale   SubCutaneous at bedtime  insulin lispro Injectable (HumaLOG) 11 Unit(s) SubCutaneous three times a day before meals  isosorbide   dinitrate Tablet (ISORDIL) 20 milliGRAM(s) Oral three times a day  levothyroxine 50 MICROGram(s) Oral daily  lidocaine   Patch 1 Patch Transdermal daily  melatonin 3 milliGRAM(s) Oral at bedtime  montelukast 10 milliGRAM(s) Oral daily  pantoprazole    Tablet 40 milliGRAM(s) Oral before breakfast  polyethylene glycol 3350 17 Gram(s) Oral daily  senna 2 Tablet(s) Oral at bedtime      LABS: All Labs Reviewed:                        7.9    8.4   )-----------( 349      ( 18 Jul 2019 05:13 )             24.2     07-18    134<L>  |  96  |  74<H>  ----------------------------<  296<H>  4.1   |  20<L>  |  1.92<H>    Ca    9.6      18 Jul 2019 05:13            Blood Culture:   Urine Culture      RADIOLOGY/EKG:    ASSESSMENT AND PLAN:  Pt is a 72 yo woman with PMHx of HTN, T2DM (A1c 7.4%), CAD s/p CABG (LIMA to LAD, SVG to OM, SVG to PDA 2014 at [facility]), non-dilated ICM (EF 20-25%), severe mitral regurgitation s/p mitral clip (6/13/19 Dr. Newman), severe pulm HTN, CKD, hypothyroidism, who is presenting to ED after experiencing worsening SOB and intermittent [facility]. Of note, pt was recently discharged on 6/19 after having mitral clip procedure completed. She initially required BiPAP with improvement in her respiratory status following diuresis. Initiated on vasopressors and inotropes in CCU, which were subsequently weaned off. Infectious work up was negative.    She remains volume overloaded with NYHA Class IIIb symptoms. She is not adequately responding to current diuretic regimen. Her weight is unchanged today despite increase in diuretics. Her Scrt is continuing to downtrend and now close to baseline. She is normotensive tolerating uptitration of vasodilators.     Problem/Plan - 1:  ·  Problem: Acute on chronic systolic heart failure.  Plan: - Please increase ISDN to 20 mg PO TID, hold for SBP < 90  -  on lasix to 60 mg IV BID Patient condition seems to be imving  - Continue hydralazine 50mg PO TID, hold for SBP < 90  - No ARB/ARNI/MRA at this time due to renal dysfunction  - Will hold on initiation of beta block until euvolemic  - Daily standing weights, strict I &Os.     Problem/Plan - 2:  ·  Problem: Severe mitral regurgitation.  Plan: - s/p Mitral clip   - TTE on 7/9: mild MR.     Problem/Plan - 3:  ·  Problem:  DM insulin glargine Injectable (LANTUS) 28 Unit(s) SubCutaneous at bedtime  insulin lispro (HumaLOG) corrective regimen sliding scale   SubCutaneous three times a day before meals  insulin lispro (HumaLOG) corrective regimen sliding scale   SubCutaneous at bedtime  insulin lispro Injectable (HumaLOG) 11 Unit(s) SubCutaneous three times a day before meals        Problem/Plan - 4:  ·  Problem: SVT (supraventricular tachycardia).  Plan: - Currently SR  - Continue to monitor on tele    -  CAD (coronary artery disease).  Plan: -Continue ASA, clopidogrel.  - Recheck digoxin level on Friday 7/19 as it was critically high on 7/16.     Problem/Plan - 5:  ·  Problem: Acute renal failure.  Plan: - Improving  - Diuresis as above  - Avoid nephrotoxins.   DVT PPX:    ADVANCED DIRECTIVE:    DISPOSITION:Discussed with medical team approach palliative care and the family for her long-term care and treatment family knows that she is really critically ill and had multiplscussions with his daughter and her son-in-law

## 2019-07-18 NOTE — PROGRESS NOTE ADULT - ASSESSMENT
Pt is a 72 yo woman with PMHx of HTN, T2DM, CAD s/p CABG (LIMA to LAD, SVG to OM, SVG to PDA 2014 at [facility]), non-dilated ICM (EF 20-25%), severe mitral regurgitation s/p mitral clip (6/13/19 Dr. Newman), severe pulm HTN, CKD, hypothyroidism, who is presenting to ED [facility]. Also complaining of intermittent chest pain. Of note, pt was recently discharged on 6/19 after having mitral clip procedure completed. Pt says that she has been experiencing SOB for about 1 week. However, she was never noted to be hypoxic in rehab until today. She was not able to provide much hx 2/2 SOB and being on bipap. Daughter at bedside reporting that pt was well immediately after discharge. However, she gradually developed worsening SOB. Better with rest initially. Nothing alleviating SOB now. It is constant throughout the day, made worse with exertion. No fevers, chills, nausea, vomiting, cough, sputum production, chest tightness, pressure, palpitations, LOC, syncope.    In the ED, pt afebrile, , /75, RR 22, O2 100% on 2L NC. She subsequently developed worsened work of breathing and was placed on bipap with improvement. She was given , lasix 40mg IVP x1. CXR significant for pulmonary edema. Anemia to 9.3/28.5. BUN/Cr 42/1.72 (54/2.54 on 6/19). BNP 6626. VBG 7.49/29/61/22. TTE pending. (05 Jul 2019 19:02)    Pt transferred to CCU for further diuresis and management of acute decompensated heart failure and acute respiratory distress.  Pt with improved cardio-pulmonary status.  Pt also c/o abd pain, thought to be due to  bowel edema/ischemia 2/2 to low CO/CI.  Pt with transminitis likely from hypopersion state.  KUB (-) for acute abdominal pathology.      Pt has been afebrile.  WBC was trending upwards.  Pt started on vanco/meropenem.  Cultures sent.  Pt with prior h/o ESBL E.coli (6/13) from urine cultures.   Cxr clear lungs.  UA from 7/9 (-) LE/nit.    ID consult called for further abx managment.           Leukocytosis:    - Likely reactive leukocytosis, now resolved.  Sepsis w/u negative.  Pt off abx, clinically stable.   Pt with prior UCx with ESBL from June, she does not appear to be acutely infected at this time.  No urinary symptoms.      No acute infectious issues at this time.  Cont to monitor for new fever, leukocytosis.              Will follow,    Joselin Roman  [phone]

## 2019-07-18 NOTE — PROGRESS NOTE ADULT - ASSESSMENT
Pt is a 72 yo woman with PMHx of HTN, T2DM, CAD s/p CABG (LIMA to LAD, SVG to OM, SVG to PDA 2014 at [facility]), non-dilated ICM (EF 20-25%), severe mitral regurgitation s/p mitral clip (6/13/19 Dr. Newman), severe pulm HTN, CKD, hypothyroidism, who is presenting to ED after experiencing worsening SOB      A/P:      MERVIN  MERVIN sec to cardiogenic shock  Renal function stable  PT non -oliguric  lasix increased by cardio  Optimize glucose control  Monitor renal function   Avoid further nephrotoxics, NSAIDS RCA    CKD stage 4:  baseline Scr 1.8-2.0  Renal function fluctuates sec to CHF  Monitor renal function at present    SOB:  in setting of HF  COntinue diuretics per cardiology    ACidosis   sec to RF  mOnitor serum Co2 at present

## 2019-07-18 NOTE — PROGRESS NOTE ADULT - SUBJECTIVE AND OBJECTIVE BOX
[facility] [place] [facility]   MD RAHEEL SHAH MD RUORU WONG, PA    TEL:  OFFICE: [phone]  DR SANTOYO CELL: [phone]  JUSTEN KENDALL CELL: [phone]  DR. NGUYEN CELL: [phone]    RENAL FOLLOW UP NOTE  --------------------------------------------------------------------------------  HPI:      Pt seen and examined at bedside.     PAST HISTORY  --------------------------------------------------------------------------------  No significant changes to PMH, PSH, FHx, SHx, unless otherwise noted    ALLERGIES & MEDICATIONS  --------------------------------------------------------------------------------  Allergies    azithromycin (Hives; Pruritus)    Intolerances      Standing Inpatient Medications  aspirin enteric coated 81 milliGRAM(s) Oral daily  atorvastatin 40 milliGRAM(s) Oral at bedtime  chlorhexidine 2% Cloths 1 Application(s) Topical daily  clopidogrel Tablet 75 milliGRAM(s) Oral daily  dextrose 5%. 1000 milliLiter(s) IV Continuous <Continuous>  dextrose 50% Injectable 12.5 Gram(s) IV Push once  dextrose 50% Injectable 25 Gram(s) IV Push once  dextrose 50% Injectable 25 Gram(s) IV Push once  docusate sodium 100 milliGRAM(s) Oral two times a day  furosemide   Injectable 60 milliGRAM(s) IV Push two times a day  hydrALAZINE 50 milliGRAM(s) Oral every 8 hours  insulin glargine Injectable (LANTUS) 28 Unit(s) SubCutaneous at bedtime  insulin lispro (HumaLOG) corrective regimen sliding scale   SubCutaneous three times a day before meals  insulin lispro (HumaLOG) corrective regimen sliding scale   SubCutaneous at bedtime  insulin lispro Injectable (HumaLOG) 11 Unit(s) SubCutaneous three times a day before meals  isosorbide   dinitrate Tablet (ISORDIL) 20 milliGRAM(s) Oral three times a day  levothyroxine 50 MICROGram(s) Oral daily  lidocaine   Patch 1 Patch Transdermal daily  melatonin 3 milliGRAM(s) Oral at bedtime  montelukast 10 milliGRAM(s) Oral daily  pantoprazole    Tablet 40 milliGRAM(s) Oral before breakfast  polyethylene glycol 3350 17 Gram(s) Oral daily  senna 2 Tablet(s) Oral at bedtime    PRN Inpatient Medications  acetaminophen   Tablet .. 650 milliGRAM(s) Oral every 6 hours PRN  dextrose 40% Gel 15 Gram(s) Oral once PRN  glucagon  Injectable 1 milliGRAM(s) IntraMuscular once PRN      REVIEW OF SYSTEMS  --------------------------------------------------------------------------------  General: no fever  CVS: no chest pain  RESP: + sob, no cough  ABD: no abdominal pain  MSK: no edema     VITALS/PHYSICAL EXAM  --------------------------------------------------------------------------------  T(C): 36.8 (07-18-19 @ 05:57), Max: 37.1 (07-17-19 @ 17:03)  HR: 76 (07-18-19 @ 06:08) (68 - 77)  BP: 105/62 (07-18-19 @ 06:08) (100/56 - 119/63)  RR: 18 (07-18-19 @ 06:08) (17 - 18)  SpO2: 99% (07-18-19 @ 06:08) (96% - 100%)  Wt(kg): --        07-17-19 @ 07:01  -  07-18-19 @ 07:00  --------------------------------------------------------  IN: 490 mL / OUT: 1050 mL / NET: -560 mL      Physical Exam:  	Gen: NAD  	HEENT: MMM  	Pulm: Crackles B/L  	CV: S1S2  	Abd: Soft, +BS  	Ext: No LE edema B/L                      Neuro: Awake   	Skin: Warm and Dry   	 no polo    LABS/STUDIES  --------------------------------------------------------------------------------              7.9    8.4   >-----------<  349      [07-18-19 @ 05:13]              24.2     134  |  96  |  74  ----------------------------<  296      [07-18-19 @ 05:13]  4.1   |  20  |  1.92        Ca     9.6     [07-18-19 @ 05:13]            Creatinine Trend:  SCr 1.92 [07-18 @ 05:13]  SCr 2.02 [07-17 @ 06:35]  SCr 2.31 [07-16 @ 06:49]  SCr 2.45 [07-15 @ 06:10]  SCr 2.59 [07-14 @ 06:45]    Urinalysis - [07-09-19 @ 13:28]      Color Yellow / Appearance Clear / SG 1.012 / pH 6.0      Gluc Negative / Ketone Negative  / Bili Negative / Urobili Negative       Blood Trace / Protein Trace / Leuk Est Negative / Nitrite Negative      RBC 1 / WBC 1 / Hyaline 0 / Gran  / Sq Epi  / Non Sq Epi 0 / Bacteria Moderate      Iron 42, TIBC 348, %sat 12      [07-05-19 @ 22:32]  Ferritin 130      [07-05-19 @ 22:32]  .4 (Ca --)      [04-25-19 @ 05:45]   --  PTH 43.55 (Ca --)      [09-10-18 @ 07:15]   --  PTH 36.60 (Ca --)      [09-08-18 @ 03:15]   --  Vitamin D (25OH) 25.9      [05-01-19 @ 04:00]  HbA1c 7.4      [07-05-19 @ 22:32]  TSH 2.00      [07-05-19 @ 22:32]  Lipid: chol 148, , HDL 35,       [06-01-19 @ 08:00]

## 2019-07-18 NOTE — PROVIDER CONTACT NOTE (OTHER) - ACTION/TREATMENT ORDERED:
PA aware, pt to receive 16Units of humalog as per pre-meal insulin/ sliding scale. Will continue to monitor pt &maintain safety. PA aware, pt received 16Units of humalog as per pre-meal insulin/ sliding scale. Will continue to monitor pt &maintain safety.

## 2019-07-19 LAB
ANION GAP SERPL CALC-SCNC: 15 MMOL/L — SIGNIFICANT CHANGE UP (ref 5–17)
BUN SERPL-MCNC: 70 MG/DL — HIGH (ref 7–23)
CALCIUM SERPL-MCNC: 8.8 MG/DL — SIGNIFICANT CHANGE UP (ref 8.4–10.5)
CHLORIDE SERPL-SCNC: 100 MMOL/L — SIGNIFICANT CHANGE UP (ref 96–108)
CO2 SERPL-SCNC: 22 MMOL/L — SIGNIFICANT CHANGE UP (ref 22–31)
CREAT SERPL-MCNC: 1.78 MG/DL — HIGH (ref 0.5–1.3)
DIGOXIN SERPL-MCNC: 1.5 NG/ML — SIGNIFICANT CHANGE UP (ref 0.8–2)
GLUCOSE BLDC GLUCOMTR-MCNC: 291 MG/DL — HIGH (ref 70–99)
GLUCOSE BLDC GLUCOMTR-MCNC: 308 MG/DL — HIGH (ref 70–99)
GLUCOSE BLDC GLUCOMTR-MCNC: 348 MG/DL — HIGH (ref 70–99)
GLUCOSE BLDC GLUCOMTR-MCNC: 383 MG/DL — HIGH (ref 70–99)
GLUCOSE SERPL-MCNC: 316 MG/DL — HIGH (ref 70–99)
HCT VFR BLD CALC: 23.5 % — LOW (ref 34.5–45)
HGB BLD-MCNC: 7.2 G/DL — LOW (ref 11.5–15.5)
MCHC RBC-ENTMCNC: 27 PG — SIGNIFICANT CHANGE UP (ref 27–34)
MCHC RBC-ENTMCNC: 30.6 GM/DL — LOW (ref 32–36)
MCV RBC AUTO: 88 FL — SIGNIFICANT CHANGE UP (ref 80–100)
PLATELET # BLD AUTO: 335 K/UL — SIGNIFICANT CHANGE UP (ref 150–400)
POTASSIUM SERPL-MCNC: 3.8 MMOL/L — SIGNIFICANT CHANGE UP (ref 3.5–5.3)
POTASSIUM SERPL-SCNC: 3.8 MMOL/L — SIGNIFICANT CHANGE UP (ref 3.5–5.3)
RBC # BLD: 2.67 M/UL — LOW (ref 3.8–5.2)
RBC # FLD: 17.4 % — HIGH (ref 10.3–14.5)
SODIUM SERPL-SCNC: 137 MMOL/L — SIGNIFICANT CHANGE UP (ref 135–145)
TROPONIN T, HIGH SENSITIVITY RESULT: 238 NG/L — HIGH (ref 0–51)
WBC # BLD: 7.44 K/UL — SIGNIFICANT CHANGE UP (ref 3.8–10.5)
WBC # FLD AUTO: 7.44 K/UL — SIGNIFICANT CHANGE UP (ref 3.8–10.5)

## 2019-07-19 PROCEDURE — 93010 ELECTROCARDIOGRAM REPORT: CPT | Mod: 76

## 2019-07-19 PROCEDURE — 99233 SBSQ HOSP IP/OBS HIGH 50: CPT

## 2019-07-19 RX ORDER — ACETAMINOPHEN 500 MG
325 TABLET ORAL ONCE
Refills: 0 | Status: COMPLETED | OUTPATIENT
Start: 2019-07-19 | End: 2019-07-19

## 2019-07-19 RX ORDER — HYDRALAZINE HCL 50 MG
50 TABLET ORAL EVERY 8 HOURS
Refills: 0 | Status: DISCONTINUED | OUTPATIENT
Start: 2019-07-19 | End: 2019-08-16

## 2019-07-19 RX ORDER — ISOSORBIDE DINITRATE 5 MG/1
30 TABLET ORAL THREE TIMES A DAY
Refills: 0 | Status: DISCONTINUED | OUTPATIENT
Start: 2019-07-19 | End: 2019-09-05

## 2019-07-19 RX ADMIN — Medication 50 MILLIGRAM(S): at 06:15

## 2019-07-19 RX ADMIN — Medication 60 MILLIGRAM(S): at 18:36

## 2019-07-19 RX ADMIN — Medication 5: at 12:18

## 2019-07-19 RX ADMIN — Medication 650 MILLIGRAM(S): at 14:56

## 2019-07-19 RX ADMIN — Medication 50 MILLIGRAM(S): at 14:15

## 2019-07-19 RX ADMIN — Medication 50 MICROGRAM(S): at 06:15

## 2019-07-19 RX ADMIN — LIDOCAINE 1 PATCH: 4 CREAM TOPICAL at 08:47

## 2019-07-19 RX ADMIN — Medication 100 MILLIGRAM(S): at 18:37

## 2019-07-19 RX ADMIN — SENNA PLUS 2 TABLET(S): 8.6 TABLET ORAL at 21:45

## 2019-07-19 RX ADMIN — Medication 4: at 08:01

## 2019-07-19 RX ADMIN — Medication 650 MILLIGRAM(S): at 21:44

## 2019-07-19 RX ADMIN — ISOSORBIDE DINITRATE 20 MILLIGRAM(S): 5 TABLET ORAL at 14:15

## 2019-07-19 RX ADMIN — MONTELUKAST 10 MILLIGRAM(S): 4 TABLET, CHEWABLE ORAL at 11:10

## 2019-07-19 RX ADMIN — Medication 3 MILLIGRAM(S): at 21:45

## 2019-07-19 RX ADMIN — INSULIN GLARGINE 28 UNIT(S): 100 INJECTION, SOLUTION SUBCUTANEOUS at 21:45

## 2019-07-19 RX ADMIN — ATORVASTATIN CALCIUM 40 MILLIGRAM(S): 80 TABLET, FILM COATED ORAL at 21:45

## 2019-07-19 RX ADMIN — Medication 650 MILLIGRAM(S): at 22:10

## 2019-07-19 RX ADMIN — Medication 325 MILLIGRAM(S): at 22:52

## 2019-07-19 RX ADMIN — Medication 11 UNIT(S): at 12:18

## 2019-07-19 RX ADMIN — ISOSORBIDE DINITRATE 30 MILLIGRAM(S): 5 TABLET ORAL at 21:46

## 2019-07-19 RX ADMIN — Medication 11 UNIT(S): at 08:01

## 2019-07-19 RX ADMIN — CLOPIDOGREL BISULFATE 75 MILLIGRAM(S): 75 TABLET, FILM COATED ORAL at 11:10

## 2019-07-19 RX ADMIN — Medication 650 MILLIGRAM(S): at 15:00

## 2019-07-19 RX ADMIN — Medication 100 MILLIGRAM(S): at 06:15

## 2019-07-19 RX ADMIN — ISOSORBIDE DINITRATE 20 MILLIGRAM(S): 5 TABLET ORAL at 06:15

## 2019-07-19 RX ADMIN — Medication 81 MILLIGRAM(S): at 11:10

## 2019-07-19 RX ADMIN — Medication 11 UNIT(S): at 16:53

## 2019-07-19 RX ADMIN — LIDOCAINE 1 PATCH: 4 CREAM TOPICAL at 21:42

## 2019-07-19 RX ADMIN — Medication 325 MILLIGRAM(S): at 23:20

## 2019-07-19 RX ADMIN — LIDOCAINE 1 PATCH: 4 CREAM TOPICAL at 07:58

## 2019-07-19 RX ADMIN — Medication 4: at 16:52

## 2019-07-19 RX ADMIN — Medication 50 MILLIGRAM(S): at 21:45

## 2019-07-19 RX ADMIN — Medication 1: at 21:46

## 2019-07-19 RX ADMIN — PANTOPRAZOLE SODIUM 40 MILLIGRAM(S): 20 TABLET, DELAYED RELEASE ORAL at 06:15

## 2019-07-19 RX ADMIN — CHLORHEXIDINE GLUCONATE 1 APPLICATION(S): 213 SOLUTION TOPICAL at 21:52

## 2019-07-19 RX ADMIN — Medication 60 MILLIGRAM(S): at 06:15

## 2019-07-19 NOTE — PROGRESS NOTE ADULT - PROBLEM SELECTOR PLAN 1
- continue lasix to 60 mg IV BID  - continue ISDN to 20 mg PO TID, hold for SBP < 90  - Continue hydralazine 50mg PO TID, hold for SBP < 90  - No ARB/ARNI/MRA at this time due to renal dysfunction  - Will hold on initiation of beta block until euvolemic  - Daily standing weights, strict I &Os.  - Daily BMP, Keep K 4-4.5 and Mag 2-2.5 - Continue lasix 60 mg IV BID  - Increase ISDN to 30 mg PO TID, hold for SBP < 95  - Continue hydralazine 50mg PO TID, hold for SBP < 95  - No ARB/ARNI/MRA at this time due to renal dysfunction  - Will hold on initiation of beta block until euvolemic  - Daily standing weights, strict I &Os.  - Daily BMP, replete electrolytes to keep K 4-4.5 and Mag 2-2.5  - If SCr continues to improve/is stable, will add spironolactone 25 mg daily tomorrow

## 2019-07-19 NOTE — CHART NOTE - NSCHARTNOTEFT_GEN_A_CORE
Medicine NP(s [id number])    (1500)called by RN; pt. c/o chest pressure. VSS  EKG done; SR with 1st degree AV block(VT .20). Incomplete LBBB(.10 sec). TWI leads 1AVL continue.  No change from EKG 6/18/19    P/E: Daughter @ bedside for translation  Pt stated has "chest pressure" 3-4 days intermittently which worsens with deep inspiration Medicine NP(s [id number])    (1500)called by RN; pt. c/o chest pressure. VSS  EKG done; SR with 1st degree AV block(DE .20). Incomplete LBBB(.10 sec). TWI leads 1AVL continue.  No change from EKG 6/18/19    P/E: Daughter @ bedside for translation  Pt stated has "chest pressure" 3-4 days intermittently which worsens with deep inspiration                Pt is a 72 yo woman with PMHx of HTN, T2DM (A1c 7.4%), CAD s/p CABG (LIMA to LAD, SVG to OM, SVG to PDA 2014 at [facility]), non-dilated ICM (EF 20-25%), severe mitral regurgitation s/p mitral clip (6/13/19 Dr. Newman), severe pulm HTN, CKD, hypothyroidism, who is presenting to ED after experiencing worsening SO[facility] and [facility] Of note, pt was recently discharged on 6/19 after having mitral clip procedure completed. She initially required BiPAP with improvement in her respiratory status following diuresis. Initiated on vasopressors and inotropes in CCU, which were subsequently weaned off. Infectious work up was negative.    She remains mildly volume overloaded with NYHA Class IIIb symptoms. Her weight is unchanged since yesterday. Her SCr is continuing to downtrend and now close to baseline. She is normotensive tolerating uptitration of vasodilators. Medicine NP(s [id number])    (1500)called by RN; pt. c/o chest pressure. VSS  EKG done; SR with 1st degree AV block(IN .20). Incomplete LBBB(.10 sec). TWI leads 1AVL continue.  No change from EKG 6/18/19     Daughter @ bedside for translation  Pt stated has "chest pressure" 3-4 days intermittently which worsens with deep inspiration  P/E:  Gen: appears weak while answering questions. Nasal O2 in place  Neuro: A & O x 3                     Pt is a 72 yo woman with PMHx of HTN, T2DM (A1c 7.4%), CAD s/p CABG (LIMA to LAD, SVG to OM, SVG to PDA 2014 at [facility]), non-dilated ICM (EF 20-25%), severe mitral regurgitation s/p mitral clip (6/13/19 Dr. Newman), severe pulm HTN, CKD, hypothyroidism, who is presenting to ED after [facility] Of note, pt was recently discharged on 6/19 after having mitral clip procedure completed. She initially required BiPAP with improvement in her respiratory status following diuresis. Initiated on vasopressors and inotropes in CCU, which were subsequently weaned off. Infectious work up was negative.    She remains mildly volume overloaded with NYHA Class IIIb symptoms. Her weight is unchanged since yesterday. Her SCr is continuing to downtrend and now close to baseline. She is normotensive tolerating uptitration of vasodilators. Medicine NP(s [id number])    (1500)called by RN; pt. c/o chest pressure. VSS. O2 Sat 100%with nasal O2  EKG done; SR with 1st degree AV block(IN .20). Incomplete LBBB(.10 sec). TWI leads 1AVL continue.  No change from EKG 6/18/19     Daughter @ bedside for translation  Pt stated has "chest pressure" 3-4 days intermittently which worsens with deep inspiration  P/E:  Gen: appears weak while answering questions. Nasal O2 in place  Neuro: A & O x 3  HEENT: EOM intact.  Neck: Neck supple. JVP 10-12 elevated. No masses  Chest: BL posterior lower lobe rales  CV: Normal S1S2. No G/R/M.   Abdomen: Soft, NT/ND, bowel sounds present.  Ext: no edema       A/P:  Pt is a 74 yo woman with PMHx of HTN, T2DM (A1c 7.4%), CAD s/p CABG (LIMA to LAD, SVG to OM, SVG to PDA 2014 at [facility]), non-dilated ICM (EF 20-25%), severe mitral regurgitation s/p mitral clip (6/13/19 Dr. Newman), severe pulm HTN, CKD, hypothyroidism, who is presenting to ED after [facility] Of note, pt was recently discharged on 6/19 after having mitral clip procedure completed. She initially required BiPAP with improvement in her respiratory status following diuresis. Initiated on vasopressors and inotropes in CCU, which were subsequently weaned off. Infectious work up was negative.  She remains mildly volume overloaded with NYHA Class IIIb symptoms. Her weight is unchanged since yesterday. Her SCr is continuing to downtrend and now close to baseline. She is normotensive tolerating uptitration of vasodilators.  Now c/o intermittent "chest pressure" x 3 days which may be associated with admitting complaints/ SOB due to fluid overload. EKG no changes. VSS.  -d/w HF NP:  - Isordil dose increased today  -draw Troponin(ordered); RN aware.  -c/w present meds; no further imaging studies  -signout given to night PA. Followup Troponin results

## 2019-07-19 NOTE — CHART NOTE - NSCHARTNOTEFT_GEN_A_CORE
Patient is a 73y old  Female who presents with a chief complaint of Hypoxia, SOB (19 Jul 2019 16:39)    Informed by RN of pt c/o chest pain, not of new onset, as was informed by day team.  Pt seen & examined at bedside, with dtr. translating for provider  Pt related having "chest pressure" mid sternum, but was improved since earlier.  Pt was moaning, and breathing shallow, due to pain at base of neck, which pt kept referring to when asked about chest symptoms  Pt otherwise felt no dizziness, n/v, abdominal pain or palpitations  EKG was completed, and revealed NSR @ 68 bpm & no acute ST T changes  Tn sent earlier was 238, downtrending from 604 on 7/13      Vital Signs Last 24 Hrs  T(C): 36.7 (19 Jul 2019 20:27), Max: 36.7 (19 Jul 2019 04:45)  T(F): 98.1 (19 Jul 2019 20:27), Max: 98.1 (19 Jul 2019 20:27)  HR: 66 (19 Jul 2019 20:27) (66 - 74)  BP: 103/56 (19 Jul 2019 20:27) (103/56 - 112/53)  BP(mean): --  RR: 18 (19 Jul 2019 20:27) (18 - 20)  SpO2: 100% (19 Jul 2019 20:27) (98% - 100%)      Labs:                          7.2    7.44  )-----------( 335      ( 19 Jul 2019 08:42 )             23.5     07-19    137  |  100  |  70<H>  ----------------------------<  316<H>  3.8   |  22  |  1.78<H>    Ca    8.8      19 Jul 2019 07:00      Troponin T, High Sensitivity (07.19.19 @ 19:33)    Troponin T, High Sensitivity Result: 238       Radiology:    Physical Exam:  General: WN/WD NAD  Neurology: A&Ox3, nonfocal, CUADRA x 4  Head:  Normocephalic, atraumatic  Respiratory: Bibasilar scattered rales, otherwise remainder of lung fields CTA  CV: RRR, S1S2, no murmur  Abdominal: Soft, Non tender, non distended, + BS  MSK: No edema, + peripheral pulses, FROM all 4 extremity          + neck pain at base, non reproducible; ROM not restricted    Assessment & Plan:  HPI:  Pt is a 74 yo woman with PMHx of HTN, T2DM (A1c 7.4%), CAD s/p CABG (LIMA to LAD, SVG to OM, SVG to PDA 2014 at [facility]), non-dilated ICM (EF 20-25%), severe mitral regurgitation s/p mitral clip (6/13/19 Dr. Newman), severe pulm HTN, CKD, hypothyroidism, who is presenting to ED after experiencing worsening SOB and intermittent chest pain for 1 week at [facility]. Of note, pt was recently discharged on 6/19 after having mitral clip procedure completed. She initially required BiPAP with improvement in her respiratory status following diuresis. Initiated on vasopressors and inotropes in CCU, which were subsequently weaned off. Infectious work up was negative.  She remains mildly volume overloaded with NYHA Class IIIb symptoms. Her weight is unchanged since yesterday. Her SCr is continuing to downtrend and now close to baseline. She is normotensive tolerating uptitration of vasodilators.     Pt evaluated tonight for recurrent chest pressure.  At bedside evaluation, pt's main complaint was neck pain, for which a lidoderm patch was in place, and was given PO Tylenol  Per dtr. pt has "arthritis" in neck  Per pt, chest pressure has improved since earlier  EKG done unremarkable for acute ST T changes; Tn downtrending to 238 from 604 (7/13)  Above info. conveyed to pt. as dtr.  was translating     PLAN:  >Continue to monitor  >Continue to follow HF recs. with medication regimen  >Give extra dose of PO Tylenol now for neck pain relief  >Will endorse to day team; follow up per Attending

## 2019-07-19 NOTE — PROGRESS NOTE ADULT - ATTENDING COMMENTS
She remains in bed and continues to complain of fatigue and weakness. Her appetite is better.  Labs are improving.    Please increase ISDN to 30 mg po TID starting tonight (hold SBP < 95).  If SCr continues to downtrend, please add spironolactone 25 mg once daily tomorrow.  Continue lasix 60 mg IV BID and hydralazine 50 mg po TID, hold SBP < 90.  Daily standing weights, please.

## 2019-07-19 NOTE — PROGRESS NOTE ADULT - SUBJECTIVE AND OBJECTIVE BOX
Subjective:  - No acute events overnight  - Has not been OOB today, but endorses some SOB at rest  - Denies CP, palpitations, lightheadedness, dizziness, or PND    Medications:  acetaminophen   Tablet .. 650 milliGRAM(s) Oral every 6 hours PRN  aspirin enteric coated 81 milliGRAM(s) Oral daily  atorvastatin 40 milliGRAM(s) Oral at bedtime  chlorhexidine 2% Cloths 1 Application(s) Topical daily  clopidogrel Tablet 75 milliGRAM(s) Oral daily  dextrose 40% Gel 15 Gram(s) Oral once PRN  dextrose 5%. 1000 milliLiter(s) IV Continuous <Continuous>  dextrose 50% Injectable 12.5 Gram(s) IV Push once  dextrose 50% Injectable 25 Gram(s) IV Push once  dextrose 50% Injectable 25 Gram(s) IV Push once  docusate sodium 100 milliGRAM(s) Oral two times a day  furosemide   Injectable 60 milliGRAM(s) IV Push two times a day  glucagon  Injectable 1 milliGRAM(s) IntraMuscular once PRN  hydrALAZINE 50 milliGRAM(s) Oral every 8 hours  insulin glargine Injectable (LANTUS) 28 Unit(s) SubCutaneous at bedtime  insulin lispro (HumaLOG) corrective regimen sliding scale   SubCutaneous three times a day before meals  insulin lispro (HumaLOG) corrective regimen sliding scale   SubCutaneous at bedtime  insulin lispro Injectable (HumaLOG) 11 Unit(s) SubCutaneous three times a day before meals  isosorbide   dinitrate Tablet (ISORDIL) 20 milliGRAM(s) Oral three times a day  levothyroxine 50 MICROGram(s) Oral daily  lidocaine   Patch 1 Patch Transdermal daily  melatonin 3 milliGRAM(s) Oral at bedtime  montelukast 10 milliGRAM(s) Oral daily  pantoprazole    Tablet 40 milliGRAM(s) Oral before breakfast  polyethylene glycol 3350 17 Gram(s) Oral daily  senna 2 Tablet(s) Oral at bedtime      Physical Exam:    Vitals:  Vital Signs Last 24 Hours  T(C): 36.6 (19 @ 11:32), Max: 36.7 (19 @ 04:45)  HR: 73 (19 @ 11:32) (68 - 76)  BP: 110/54 (19 @ 11:32) (105/58 - 114/62)  RR: 19 (19 @ 11:32) (18 - 20)  SpO2: 100% (19 @ 11:32) (99% - 100%)    Weight in k.9 ( @ 10:00)    I&O's Summary    2019 07:  -  2019 07:00  --------------------------------------------------------  IN: 550 mL / OUT: 1575 mL / NET: -1025 mL    2019 07:  -  2019 13:27  --------------------------------------------------------  IN: 340 mL / OUT: 350 mL / NET: -10 mL        Tele: SR 60-80s    General: No distress. Comfortable.  HEENT: EOM intact.  Neck: Neck supple. JVP 10 cm H2O. No masses  Chest: BL posterior lower lobe rales  CV: RRR, Normal S1 and S2. No murmurs, rub, or gallops. Radial pulses normal. No LE edema  Abdomen: Soft, mildly distended, non-tender  Skin: No rashes or skin breakdown  Neurology: Alert and oriented times three. Sensation intact  Psych: Affect normal    Labs:                        7.2    7.44  )-----------( 335      ( 2019 08:42 )             23.5         137  |  100  |  70<H>  ----------------------------<  316<H>  3.8   |  22  |  1.78<H>    Ca    8.8      2019 07:00

## 2019-07-19 NOTE — PROGRESS NOTE ADULT - ATTENDING COMMENTS
A review of the paper chart has been conducted  HCP form present:               Yes and valid []               Yes but invalid []                No [X]   MOLST present:                   Yes and valid []               Yes but invalid []                No [X]  Incapacity form present:   Yes and valid []                Yes but invalid []                No []                 N/A [X]  Living Will:                            Yes and valid []                Yes but invalid []                No [X]    Daughter in law by the bedside   Collateral information obtained  No spouse  7 children  Elsa (lives with the patient) DELORES Purcell son DELORES ESTRELLA  [facility]  Anuj: Bryanna [facility]    Explained Family Health Care Decision Act Surrogate Decision Maker [facility] Article 81 Guardian-->Spouse or domestic partner--> Adult child-->Parent-->Sibling--> Close friend    Discussed HCP assignment  Encouraged HCP assignment  Provided documentation for review  Pt and family will fill out the paperwork this weekend  Daughter in law intimated that repeated hospitalizations would not be desired by the patient  Palliative will follow

## 2019-07-19 NOTE — PROGRESS NOTE ADULT - SUBJECTIVE AND OBJECTIVE BOX
Contacted by primary team  Explained that no meeting was scheduled for today, arbitrary time 1pm selected by consult team during to potentially see the patient not conflict other flanking meetings earlier in the day.  Communicated to floor team that no meeting was set up        T(C): 36.6 (07-19-19 @ 11:32), Max: 36.7 (07-19-19 @ 04:45)  HR: 73 (07-19-19 @ 14:13) (68 - 76)  BP: 109/63 (07-19-19 @ 14:13) (105/58 - 114/62)  RR: 18 (07-19-19 @ 14:13) (18 - 20)  SpO2: 98% (07-19-19 @ 14:13) (98% - 100%)  Wt(kg): --      18 Jul 2019 07:01  -  19 Jul 2019 07:00  --------------------------------------------------------  IN:    Oral Fluid: 550 mL  Total IN: 550 mL    OUT:    Voided: 1575 mL  Total OUT: 1575 mL    Total NET: -1025 mL      19 Jul 2019 07:01  -  19 Jul 2019 16:41  --------------------------------------------------------  IN:    Oral Fluid: 560 mL  Total IN: 560 mL    OUT:    Voided: 650 mL  Total OUT: 650 mL    Total NET: -90 mL          07-18 @ 07:01  -  07-19 @ 07:00  --------------------------------------------------------  IN: 550 mL / OUT: 1575 mL / NET: -1025 mL    07-19 @ 07:01  -  07-19 @ 16:41  --------------------------------------------------------  IN: 560 mL / OUT: 650 mL / NET: -90 mL      CAPILLARY BLOOD GLUCOSE      POCT Blood Glucose.: 308 mg/dL (19 Jul 2019 16:36)  POCT Blood Glucose.: 383 mg/dL (19 Jul 2019 12:06)  POCT Blood Glucose.: 348 mg/dL (19 Jul 2019 07:46)  POCT Blood Glucose.: 370 mg/dL (18 Jul 2019 21:17)        acetaminophen   Tablet .. 650 milliGRAM(s) Oral every 6 hours PRN  aspirin enteric coated 81 milliGRAM(s) Oral daily  atorvastatin 40 milliGRAM(s) Oral at bedtime  chlorhexidine 2% Cloths 1 Application(s) Topical daily  clopidogrel Tablet 75 milliGRAM(s) Oral daily  dextrose 40% Gel 15 Gram(s) Oral once PRN  dextrose 5%. 1000 milliLiter(s) IV Continuous <Continuous>  dextrose 50% Injectable 12.5 Gram(s) IV Push once  dextrose 50% Injectable 25 Gram(s) IV Push once  dextrose 50% Injectable 25 Gram(s) IV Push once  docusate sodium 100 milliGRAM(s) Oral two times a day  furosemide   Injectable 60 milliGRAM(s) IV Push two times a day  glucagon  Injectable 1 milliGRAM(s) IntraMuscular once PRN  hydrALAZINE 50 milliGRAM(s) Oral every 8 hours  insulin glargine Injectable (LANTUS) 28 Unit(s) SubCutaneous at bedtime  insulin lispro (HumaLOG) corrective regimen sliding scale   SubCutaneous three times a day before meals  insulin lispro (HumaLOG) corrective regimen sliding scale   SubCutaneous at bedtime  insulin lispro Injectable (HumaLOG) 11 Unit(s) SubCutaneous three times a day before meals  isosorbide   dinitrate Tablet (ISORDIL) 20 milliGRAM(s) Oral three times a day  levothyroxine 50 MICROGram(s) Oral daily  lidocaine   Patch 1 Patch Transdermal daily  melatonin 3 milliGRAM(s) Oral at bedtime  montelukast 10 milliGRAM(s) Oral daily  pantoprazole    Tablet 40 milliGRAM(s) Oral before breakfast  polyethylene glycol 3350 17 Gram(s) Oral daily  senna 2 Tablet(s) Oral at bedtime          07-19    137  |  100  |  70<H>  ----------------------------<  316<H>  3.8   |  22  |  1.78<H>    Ca    8.8      19 Jul 2019 07:00        Procalc  BNP  ABG                          7.2    7.44  )-----------( 335      ( 19 Jul 2019 08:42 )             23.5           blood and urine cultures          PERTINENT PMH REVIEWED:  [ x] YES [ ] NO           SOCIAL HISTORY:  Significant other/partner:  [ ] YES  [x ] NO            Children:  [x] YES  [ ] NO                   Zoroastrianism/Spirituality: Religious  Substance hx:  [ ] YES   [x ] NO           Tobacco hx:  [ ] YES  [x ] NO             Alcohol hx: [ ] YES  [x ] NO        Home Opioid hx:  [ ] YES  [ x] NO   Living Situation: [ ] Home  [ ] Long term care  [x ] Rehab    REFERRALS:   [ ] Chaplaincy  [ ] Hospice  [ ] Child Life  [ ] Social Work  [ ] Case management [ ] Holistic Therapy     FAMILY HISTORY:  Family history of hypertension (Sibling): sister  Family history of diabetes mellitus (Sibling): brothers/sisters    BASELINE ADLs (prior to admission):  Independent [ ] moderately [ ] fully   Dependent   [ x] moderately [ ] fully    ADVANCE DIRECTIVES:  [ ] YES [x ] NO   DNR [ ] YES [x ] NO                      MOLST  [ ] YES [ x] NO    Living Will  [ ] YES [ ] NO    Health Care Proxy [ ] YES  [ ] NO      [ ] Surrogate  [x ] HCP  [ ] Guardian: Elsa Negrete- needs official documentation        Phone#: [phone]    Allergies    azithromycin (Hives; Pruritus)    Intolerances    MEDICATIONS  (STANDING):  aspirin enteric coated 81 milliGRAM(s) Oral daily  atorvastatin 40 milliGRAM(s) Oral at bedtime  chlorhexidine 2% Cloths 1 Application(s) Topical daily  clopidogrel Tablet 75 milliGRAM(s) Oral daily  dextrose 5%. 1000 milliLiter(s) (50 mL/Hr) IV Continuous <Continuous>  dextrose 50% Injectable 12.5 Gram(s) IV Push once  dextrose 50% Injectable 25 Gram(s) IV Push once  dextrose 50% Injectable 25 Gram(s) IV Push once  docusate sodium 100 milliGRAM(s) Oral two times a day  furosemide   Injectable 40 milliGRAM(s) IV Push two times a day  hydrALAZINE 50 milliGRAM(s) Oral every 8 hours  insulin glargine Injectable (LANTUS) 28 Unit(s) SubCutaneous at bedtime  insulin lispro (HumaLOG) corrective regimen sliding scale   SubCutaneous three times a day before meals  insulin lispro (HumaLOG) corrective regimen sliding scale   SubCutaneous at bedtime  insulin lispro Injectable (HumaLOG) 11 Unit(s) SubCutaneous three times a day before meals  isosorbide   dinitrate Tablet (ISORDIL) 10 milliGRAM(s) Oral three times a day  levothyroxine 50 MICROGram(s) Oral daily  lidocaine   Patch 1 Patch Transdermal daily  melatonin 3 milliGRAM(s) Oral at bedtime  montelukast 10 milliGRAM(s) Oral daily  pantoprazole    Tablet 40 milliGRAM(s) Oral before breakfast  polyethylene glycol 3350 17 Gram(s) Oral daily  senna 2 Tablet(s) Oral at bedtime    MEDICATIONS  (PRN):  acetaminophen   Tablet .. 650 milliGRAM(s) Oral every 6 hours PRN Mild Pain (1 - 3), Moderate Pain (4 - 6)  dextrose 40% Gel 15 Gram(s) Oral once PRN Blood Glucose LESS THAN 70 milliGRAM(s)/deciliter  glucagon  Injectable 1 milliGRAM(s) IntraMuscular once PRN Glucose LESS THAN 70 milligrams/deciliter      PRESENT SYMPTOMS:  Source: [ x] Patient   [x ] Family   [ ] Team     Pain: [x ] YES [ ] NO (neck)  OLDCARTS:     Dyspnea on exertion: [ x] YES [ ] NO   SOB: [ ] YES [x ] NO  Anxiety: [x ] YES [ ] NO  Fatigue: [x ] YES [ ] NO   Nausea: [ ] YES [x ] NO  Loss of appetite: [ ] YES [x ] NO   Constipation: [ ] YES [ x] NO     Other Symptoms:  [ ] All other review of systems negative   [ ] Unable to obtain due to poor mentation     Karnofsky Performance Score/Palliative Performance Status Version 2:         %  Protein Calorie Malutrition:  [x ] Mild   [ ] Moderate   [ ] Severe     Vital Signs Last 24 Hrs  T(C): 37.1 (17 Jul 2019 04:26), Max: 37.2 (16 Jul 2019 20:38)  T(F): 98.8 (17 Jul 2019 04:26), Max: 98.9 (16 Jul 2019 20:38)  HR: 67 (17 Jul 2019 04:26) (67 - 75)  BP: 109/61 (17 Jul 2019 04:26) (100/56 - 109/61)  BP(mean): --  RR: 18 (17 Jul 2019 04:26) (18 - 19)  SpO2: 100% (17 Jul 2019 04:26) (98% - 100%)    Physical Exam:    General: [ x] Alert,  A&O x 2    [ ] lethargic   [ ] Agitated   [ ] Cachexia   HEENT: [x ] Normal   [ ] Dry mouth   [ ] ET Tube    [ ] Trach   Lungs: [x ] Clear [ ] Rhonchi  [ ] Crackles [ ] Wheezing [ ] Tachypnea  [ ] Audible excessive secretions   Cardiovascular:  [x ] Regular rate and rhythm  [ ] Irregular [ ] Tachycardia   [ ] Bradycardia   Abdomen: [x ] Soft  [ ] Distended  [ ]  [x ] +BS  [x ] Non tender [ ] Tender  [ ]PEG   [ ] NGT   Last BM:     Genitourinary: [x ] Normal [ ] Incontinent   [ ] Oliguria/Anuria   [ ] Oscar  Musculoskeletal:  [ ] Normal   [x ] Generalized weakness  [ ] Bedbound   Neurological: [x ] No focal deficits  [ ] Cognitive impairment     Skin: [ x] Normal   [ ] Pressure ulcers     LABS:                        7.5    8.22  )-----------( 284      ( 17 Jul 2019 09:19 )             24.3     07-17    133<L>  |  98  |  70<H>  ----------------------------<  286<H>  4.7   |  21<L>  |  2.02<H>    Ca    9.3      17 Jul 2019 06:35  Phos  4.1     07-16  Mg     2.3     07-16    TPro  7.4  /  Alb  3.5  /  TBili  0.2  /  DBili  x   /  AST  30  /  ALT  131<H>  /  AlkPhos  133<H>  07-16        I&O's Summary    16 Jul 2019 07:01  -  17 Jul 2019 07:00  --------------------------------------------------------  IN: 120 mL / OUT: 1500 mL / NET: -1380 mL    17 Jul 2019 07:01  -  17 Jul 2019 11:50  --------------------------------------------------------  IN: 240 mL / OUT: 400 mL / NET: -160 mL

## 2019-07-19 NOTE — PROGRESS NOTE ADULT - ASSESSMENT
Pt is a 72 yo woman with PMHx of HTN, T2DM (A1c 7.4%), CAD s/p CABG (LIMA to LAD, SVG to OM, SVG to PDA 2014 at [facility]), non-dilated ICM (EF 20-25%), severe mitral regurgitation s/p mitral clip (6/13/19 Dr. Newman), severe pulm HTN, CKD, hypothyroidism, who is presenting to ED after experiencing worsening SOB and intermittent chest pain for 1 week at [facility]. Of note, pt was recently discharged on 6/19 after having mitral clip procedure completed. She initially required BiPAP with improvement in her respiratory status following diuresis. Initiated on vasopressors and inotropes in CCU, which were subsequently weaned off. Infectious work up was negative.    She remains mildly volume overloaded with NYHA Class IIIb symptoms. She is not adequately responding to current diuretic regimen. Her weight is unchanged since yesterday. Her SCr is continuing to downtrend and now close to baseline. She is normotensive tolerating uptitration of vasodilators. Pt is a 72 yo woman with PMHx of HTN, T2DM (A1c 7.4%), CAD s/p CABG (LIMA to LAD, SVG to OM, SVG to PDA 2014 at [facility]), non-dilated ICM (EF 20-25%), severe mitral regurgitation s/p mitral clip (6/13/19 Dr. Newman), severe pulm HTN, CKD, hypothyroidism, who is presenting to ED after experiencing worsening SOB and intermittent chest pain for 1 week at [facility]. Of note, pt was recently discharged on 6/19 after having mitral clip procedure completed. She initially required BiPAP with improvement in her respiratory status following diuresis. Initiated on vasopressors and inotropes in CCU, which were subsequently weaned off. Infectious work up was negative.    She remains mildly volume overloaded with NYHA Class IIIb symptoms. Her weight is unchanged since yesterday. Her SCr is continuing to downtrend and now close to baseline. She is normotensive tolerating uptitration of vasodilators.

## 2019-07-19 NOTE — PROGRESS NOTE ADULT - SUBJECTIVE AND OBJECTIVE BOX
[facility]   MD RAHEEL SHAH MD RUORU WONG, PA    TEL:  OFFICE: [phone]  DR SANTOYO CELL: [phone]  JUSTEN KENDALL CELL: [phone]  DR. NGUYEN CELL: [phone]    RENAL FOLLOW UP NOTE  --------------------------------------------------------------------------------  HPI:      Pt seen and examined at bedside.   intermittent SOB  no edema    PAST HISTORY  --------------------------------------------------------------------------------  No significant changes to PMH, PSH, FHx, SHx, unless otherwise noted    ALLERGIES & MEDICATIONS  --------------------------------------------------------------------------------  Allergies    azithromycin (Hives; Pruritus)    Intolerances      Standing Inpatient Medications  aspirin enteric coated 81 milliGRAM(s) Oral daily  atorvastatin 40 milliGRAM(s) Oral at bedtime  chlorhexidine 2% Cloths 1 Application(s) Topical daily  clopidogrel Tablet 75 milliGRAM(s) Oral daily  dextrose 5%. 1000 milliLiter(s) IV Continuous <Continuous>  dextrose 50% Injectable 12.5 Gram(s) IV Push once  dextrose 50% Injectable 25 Gram(s) IV Push once  dextrose 50% Injectable 25 Gram(s) IV Push once  docusate sodium 100 milliGRAM(s) Oral two times a day  furosemide   Injectable 60 milliGRAM(s) IV Push two times a day  hydrALAZINE 50 milliGRAM(s) Oral every 8 hours  insulin glargine Injectable (LANTUS) 28 Unit(s) SubCutaneous at bedtime  insulin lispro (HumaLOG) corrective regimen sliding scale   SubCutaneous three times a day before meals  insulin lispro (HumaLOG) corrective regimen sliding scale   SubCutaneous at bedtime  insulin lispro Injectable (HumaLOG) 11 Unit(s) SubCutaneous three times a day before meals  isosorbide   dinitrate Tablet (ISORDIL) 20 milliGRAM(s) Oral three times a day  levothyroxine 50 MICROGram(s) Oral daily  lidocaine   Patch 1 Patch Transdermal daily  melatonin 3 milliGRAM(s) Oral at bedtime  montelukast 10 milliGRAM(s) Oral daily  pantoprazole    Tablet 40 milliGRAM(s) Oral before breakfast  polyethylene glycol 3350 17 Gram(s) Oral daily  senna 2 Tablet(s) Oral at bedtime    PRN Inpatient Medications  acetaminophen   Tablet .. 650 milliGRAM(s) Oral every 6 hours PRN  dextrose 40% Gel 15 Gram(s) Oral once PRN  glucagon  Injectable 1 milliGRAM(s) IntraMuscular once PRN      REVIEW OF SYSTEMS  --------------------------------------------------------------------------------  General: no fever  CVS: no chest pain  RESP: intermittent sob, no cough  ABD: no abdominal pain  MSK: no edema     VITALS/PHYSICAL EXAM  --------------------------------------------------------------------------------  T(C): 36.7 (07-19-19 @ 04:45), Max: 36.7 (07-18-19 @ 11:58)  HR: 68 (07-19-19 @ 06:19) (68 - 76)  BP: 105/58 (07-19-19 @ 06:19) (105/58 - 114/62)  RR: 20 (07-19-19 @ 04:45) (18 - 20)  SpO2: 99% (07-19-19 @ 04:45) (99% - 100%)  Wt(kg): --        07-18-19 @ 07:01  -  07-19-19 @ 07:00  --------------------------------------------------------  IN: 550 mL / OUT: 1575 mL / NET: -1025 mL      Physical Exam:  	Gen: NAD  	HEENT: MMM  	Pulm: Crackles  B/L  	CV: S1S2  	Abd: Soft, +BS  	Ext: No LE edema B/L                      Neuro: Awake   	Skin: Warm and Dry   	 no polo    LABS/STUDIES  --------------------------------------------------------------------------------              7.9    8.4   >-----------<  349      [07-18-19 @ 05:13]              24.2     137  |  100  |  70  ----------------------------<  316      [07-19-19 @ 07:00]  3.8   |  22  |  1.78        Ca     8.8     [07-19-19 @ 07:00]            Creatinine Trend:  SCr 1.78 [07-19 @ 07:00]  SCr 1.92 [07-18 @ 05:13]  SCr 2.02 [07-17 @ 06:35]  SCr 2.31 [07-16 @ 06:49]  SCr 2.45 [07-15 @ 06:10]    Urinalysis - [07-09-19 @ 13:28]      Color Yellow / Appearance Clear / SG 1.012 / pH 6.0      Gluc Negative / Ketone Negative  / Bili Negative / Urobili Negative       Blood Trace / Protein Trace / Leuk Est Negative / Nitrite Negative      RBC 1 / WBC 1 / Hyaline 0 / Gran  / Sq Epi  / Non Sq Epi 0 / Bacteria Moderate      Iron 42, TIBC 348, %sat 12      [07-05-19 @ 22:32]  Ferritin 130      [07-05-19 @ 22:32]  .4 (Ca --)      [04-25-19 @ 05:45]   --  PTH 43.55 (Ca --)      [09-10-18 @ 07:15]   --  PTH 36.60 (Ca --)      [09-08-18 @ 03:15]   --  Vitamin D (25OH) 25.9      [05-01-19 @ 04:00]  HbA1c 7.4      [07-05-19 @ 22:32]  TSH 2.00      [07-05-19 @ 22:32]  Lipid: chol 148, , HDL 35,       [06-01-19 @ 08:00]

## 2019-07-19 NOTE — PROGRESS NOTE ADULT - SUBJECTIVE AND OBJECTIVE BOX
CHIEF COMPLAINT:    SUBJECTIVE:     REVIEW OF SYSTEMS:    CONSTITUTIONAL: (  )  weakness,  (  ) fevers or chills  EYES/ENT: (  )visual changes;     NECK: (  ) pain or stiffness  RESPIRATORY:   (  )cough, wheezing, hemoptysis;  (  ) shortness of breath  CARDIOVASCULAR:  (  )chest pain or palpitations  GASTROINTESTINAL:   (  )abdominal or epigastric pain.  (  ) nausea, vomiting, or hematemesis;   (   ) diarrhea or constipation.   GENITOURINARY:   (    ) dysuria, frequency or hematuria  NEUROLOGICAL:  (   ) numbness or weakness   All other review of systems is negative unless indicated above    Vital Signs Last 24 Hrs  T(C): 36.7 (19 Jul 2019 04:45), Max: 36.7 (18 Jul 2019 11:58)  T(F): 98 (19 Jul 2019 04:45), Max: 98.1 (18 Jul 2019 11:58)  HR: 68 (19 Jul 2019 06:19) (68 - 76)  BP: 105/58 (19 Jul 2019 06:19) (105/58 - 114/62)  BP(mean): --  RR: 20 (19 Jul 2019 04:45) (18 - 20)  SpO2: 99% (19 Jul 2019 04:45) (99% - 100%)    I&O's Summary    18 Jul 2019 07:01  -  19 Jul 2019 07:00  --------------------------------------------------------  IN: 550 mL / OUT: 1575 mL / NET: -1025 mL        CAPILLARY BLOOD GLUCOSE      POCT Blood Glucose.: 370 mg/dL (18 Jul 2019 21:17)  POCT Blood Glucose.: 310 mg/dL (18 Jul 2019 16:38)  POCT Blood Glucose.: 395 mg/dL (18 Jul 2019 12:14)  POCT Blood Glucose.: 402 mg/dL (18 Jul 2019 12:09)  POCT Blood Glucose.: 345 mg/dL (18 Jul 2019 07:47)      PHYSICAL EXAM:    Constitutional:  (   ) NAD,   (   )awake and alert  HEENT: PERR, EOMI,    Neck: Soft and supple, No LAD, No JVD  Respiratory:  (    Breath sounds are clear bilaterally,    (   ) wheezing, rales or rhonchi  Cardiovascular:     (   )S1 and S2, regular rate and rhythm, no Murmurs, gallops or rubs  Gastrointestinal:  (   )Bowel Sounds present, soft,   (  )nontender, nondistended,    Extremities:    (  ) peripheral edema  Vascular: 2+ peripheral pulses  Neurological:    (    )A/O x 3,   (  ) focal deficits  Musculoskeletal:    (   )  normal strength b/l upper  (     ) normal  lower extremities  Skin: No rashes    MEDICATIONS:  MEDICATIONS  (STANDING):  aspirin enteric coated 81 milliGRAM(s) Oral daily  atorvastatin 40 milliGRAM(s) Oral at bedtime  chlorhexidine 2% Cloths 1 Application(s) Topical daily  clopidogrel Tablet 75 milliGRAM(s) Oral daily  dextrose 5%. 1000 milliLiter(s) (50 mL/Hr) IV Continuous <Continuous>  dextrose 50% Injectable 12.5 Gram(s) IV Push once  dextrose 50% Injectable 25 Gram(s) IV Push once  dextrose 50% Injectable 25 Gram(s) IV Push once  docusate sodium 100 milliGRAM(s) Oral two times a day  furosemide   Injectable 60 milliGRAM(s) IV Push two times a day  hydrALAZINE 50 milliGRAM(s) Oral every 8 hours  insulin glargine Injectable (LANTUS) 28 Unit(s) SubCutaneous at bedtime  insulin lispro (HumaLOG) corrective regimen sliding scale   SubCutaneous three times a day before meals  insulin lispro (HumaLOG) corrective regimen sliding scale   SubCutaneous at bedtime  insulin lispro Injectable (HumaLOG) 11 Unit(s) SubCutaneous three times a day before meals  isosorbide   dinitrate Tablet (ISORDIL) 20 milliGRAM(s) Oral three times a day  levothyroxine 50 MICROGram(s) Oral daily  lidocaine   Patch 1 Patch Transdermal daily  melatonin 3 milliGRAM(s) Oral at bedtime  montelukast 10 milliGRAM(s) Oral daily  pantoprazole    Tablet 40 milliGRAM(s) Oral before breakfast  polyethylene glycol 3350 17 Gram(s) Oral daily  senna 2 Tablet(s) Oral at bedtime      LABS: All Labs Reviewed:                        7.9    8.4   )-----------( 349      ( 18 Jul 2019 05:13 )             24.2     07-18    134<L>  |  96  |  74<H>  ----------------------------<  296<H>  4.1   |  20<L>  |  1.92<H>    Ca    9.6      18 Jul 2019 05:13            Blood Culture:   Urine Culture      RADIOLOGY/EKG:    ASSESSMENT AND PLAN:    DVT PPX:    ADVANCED DIRECTIVE:    DISPOSITION: CHIEF COMPLAINT: SOB   - Reports feeling   sick  Continues to reports generalized weakness and fatigue.  - Notes orthopnea. Has not been OOB yet today. Denies SOB at rest, CP, palpitations, lightheadedness, dizziness    SUBJECTIVE:     REVIEW OF SYSTEMS:    CONSTITUTIONAL: (  x)  weakness,  (  ) fevers or chills  EYES/ENT: (  )visual changes;     NECK: (  ) pain or stiffness  RESPIRATORY:   (  )cough, wheezing, hemoptysis;  (  ) shortness of breath  CARDIOVASCULAR:  (  )chest pain or palpitations  GASTROINTESTINAL:   (  )abdominal or epigastric pain.  (  ) nausea, vomiting, or hematemesis;   (   ) diarrhea or constipation.   GENITOURINARY:   (    ) dysuria, frequency or hematuria  NEUROLOGICAL:  (   ) numbness or weakness   All other review of systems is negative unless indicated above    Vital Signs Last 24 Hrs  T(C): 36.7 (19 Jul 2019 04:45), Max: 36.7 (18 Jul 2019 11:58)  T(F): 98 (19 Jul 2019 04:45), Max: 98.1 (18 Jul 2019 11:58)  HR: 68 (19 Jul 2019 06:19) (68 - 76)  BP: 105/58 (19 Jul 2019 06:19) (105/58 - 114/62)  BP(mean): --  RR: 20 (19 Jul 2019 04:45) (18 - 20)  SpO2: 99% (19 Jul 2019 04:45) (99% - 100%)    I&O's Summary    18 Jul 2019 07:01  -  19 Jul 2019 07:00  --------------------------------------------------------  IN: 550 mL / OUT: 1575 mL / NET: -1025 mL        CAPILLARY BLOOD GLUCOSE      POCT Blood Glucose.: 370 mg/dL (18 Jul 2019 21:17)  POCT Blood Glucose.: 310 mg/dL (18 Jul 2019 16:38)  POCT Blood Glucose.: 395 mg/dL (18 Jul 2019 12:14)  POCT Blood Glucose.: 402 mg/dL (18 Jul 2019 12:09)  POCT Blood Glucose.: 345 mg/dL (18 Jul 2019 07:47)      PHYSICAL EXAM:  General: No distress. Comfortable.  HEENT: EOM intact.  Neck: Neck supple. JVP 10-12 elevated. No masses  Chest: BL posterior lower lobe rales  CV: RRR, Normal S1 and S2. No murmurs, rub, or gallops. Radial pulses normal. No LE edema  Abdomen: Soft, mildly distended, non-tender  Skin: No rashes or skin breakdown  Neurology: Alert and oriented times three. Sensation intact  Psych:  depresses       MEDICATIONS:  MEDICATIONS  (STANDING):  aspirin enteric coated 81 milliGRAM(s) Oral daily  atorvastatin 40 milliGRAM(s) Oral at bedtime  chlorhexidine 2% Cloths 1 Application(s) Topical daily  clopidogrel Tablet 75 milliGRAM(s) Oral daily  dextrose 5%. 1000 milliLiter(s) (50 mL/Hr) IV Continuous <Continuous>  dextrose 50% Injectable 12.5 Gram(s) IV Push once  dextrose 50% Injectable 25 Gram(s) IV Push once  dextrose 50% Injectable 25 Gram(s) IV Push once  docusate sodium 100 milliGRAM(s) Oral two times a day  furosemide   Injectable 60 milliGRAM(s) IV Push two times a day  hydrALAZINE 50 milliGRAM(s) Oral every 8 hours  insulin glargine Injectable (LANTUS) 28 Unit(s) SubCutaneous at bedtime  insulin lispro (HumaLOG) corrective regimen sliding scale   SubCutaneous three times a day before meals  insulin lispro (HumaLOG) corrective regimen sliding scale   SubCutaneous at bedtime  insulin lispro Injectable (HumaLOG) 11 Unit(s) SubCutaneous three times a day before meals  isosorbide   dinitrate Tablet (ISORDIL) 20 milliGRAM(s) Oral three times a day  levothyroxine 50 MICROGram(s) Oral daily  lidocaine   Patch 1 Patch Transdermal daily  melatonin 3 milliGRAM(s) Oral at bedtime  montelukast 10 milliGRAM(s) Oral daily  pantoprazole    Tablet 40 milliGRAM(s) Oral before breakfast  polyethylene glycol 3350 17 Gram(s) Oral daily  senna 2 Tablet(s) Oral at bedtime      LABS: All Labs Reviewed:                        7.9    8.4   )-----------( 349      ( 18 Jul 2019 05:13 )             24.2     07-18    134<L>  |  96  |  74<H>  ----------------------------<  296<H>  4.1   |  20<L>  |  1.92<H>    Ca    9.6      18 Jul 2019 05:13            Blood Culture:   Urine Culture      RADIOLOGY/EKG:    ASSESSMENT AND PLAN:  Pt is a 72 yo woman with PMHx of HTN, T2DM (A1c 7.4%), CAD s/p CABG (LIMA to LAD, SVG to OM, SVG to PDA 2014 at [facility]), non-dilated ICM (EF 20-25%), severe mitral regurgitation s/p mitral clip (6/13/19 Dr. Newman), severe pulm HTN, CKD, hypothyroidism, who is presenting to ED after experiencing worsening [facility] and [facility] Of note, pt was recently discharged on 6/19 after having mitral clip procedure completed. She initially required BiPAP with improvement in her respiratory status following diuresis. Initiated on vasopressors and inotropes in CCU, which were subsequently weaned off. Infectious work up was negative.    She remains volume overloaded with NYHA Class IIIb symptoms. She is not adequately responding to current diuretic regimen. Her weight is unchanged today despite increase in diuretics. Her Scrt is continuing to downtrend and now close to baseline. She is normotensive tolerating uptitration of vasodilators.     Problem/Plan - 1:  ·  Problem: Acute on chronic systolic heart failure.  Plan: - Please increase ISDN to 20 mg PO TID, hold for SBP < 90  -  on lasix to 60 mg IV BID Patient condition seems to be imving  - Continue hydralazine 50mg PO TID, hold for SBP < 90  - No ARB/ARNI/MRA at this time due to renal dysfunction  - Will hold on initiation of beta block until euvolemic  - Daily standing weights, strict I &Os.     Problem/Plan - 2:  ·  Problem: Severe mitral regurgitation.  Plan: - s/p Mitral clip   - TTE on 7/9: mild MR.     Problem/Plan - 3:  ·  Problem:  DM insulin glargine Injectable (LANTUS) 28 Unit(s) SubCutaneous at bedtime  insulin lispro (HumaLOG) corrective regimen sliding scale   SubCutaneous three times a day before meals  insulin lispro (HumaLOG) corrective regimen sliding scale   SubCutaneous at bedtime  insulin lispro Injectable (HumaLOG) 11 Unit(s) SubCutaneous three times a day before meals        Problem/Plan - 4:  ·  Problem: SVT (supraventricular tachycardia).  Plan: - Currently SR  - Continue to monitor on tele    -  CAD (coronary artery disease).  Plan: -Continue ASA, clopidogrel.  - Recheck digoxin level on Friday 7/19 as it was critically high on 7/16.     Problem/Plan - 5:  ·  Problem: Acute renal failure.  Plan: - Improving  - Diuresis as above  - Avoid nephrotoxins.   DVT PPX:    ADVANCED DIRECTIVE:    DISPOSITION: REHAB? DW team

## 2019-07-19 NOTE — PROGRESS NOTE ADULT - ASSESSMENT
Pt is a 74 yo woman with PMHx of HTN, T2DM, CAD s/p CABG (LIMA to LAD, SVG to OM, SVG to PDA 2014 at [facility]), non-dilated ICM (EF 20-25%), severe mitral regurgitation s/p mitral clip (6/13/19 Dr. Newman), severe pulm HTN, CKD, hypothyroidism, who is presenting to ED after experiencing worsening SOB      A/P:      MERVIN  MERVIN sec to cardiogenic shock  Renal function stable  PT non -oliguric  continue lasix per cardiology  Optimize glucose control  Monitor renal function   Avoid further nephrotoxics, NSAIDS RCA    CKD stage 4:  baseline Scr 1.8-2.0  Renal function fluctuates sec to CHF  Monitor renal function at present    SOB:  in setting of HF  COntinue diuretics per cardiology    ACidosis   sec to RF  Improved  mOnitor serum Co2 at present

## 2019-07-20 LAB
ANION GAP SERPL CALC-SCNC: 18 MMOL/L — HIGH (ref 5–17)
BUN SERPL-MCNC: 63 MG/DL — HIGH (ref 7–23)
CALCIUM SERPL-MCNC: 9.1 MG/DL — SIGNIFICANT CHANGE UP (ref 8.4–10.5)
CHLORIDE SERPL-SCNC: 101 MMOL/L — SIGNIFICANT CHANGE UP (ref 96–108)
CO2 SERPL-SCNC: 21 MMOL/L — LOW (ref 22–31)
CREAT SERPL-MCNC: 1.77 MG/DL — HIGH (ref 0.5–1.3)
GLUCOSE BLDC GLUCOMTR-MCNC: 289 MG/DL — HIGH (ref 70–99)
GLUCOSE BLDC GLUCOMTR-MCNC: 305 MG/DL — HIGH (ref 70–99)
GLUCOSE BLDC GLUCOMTR-MCNC: 335 MG/DL — HIGH (ref 70–99)
GLUCOSE BLDC GLUCOMTR-MCNC: 395 MG/DL — HIGH (ref 70–99)
GLUCOSE SERPL-MCNC: 269 MG/DL — HIGH (ref 70–99)
HCT VFR BLD CALC: 24.5 % — LOW (ref 34.5–45)
HGB BLD-MCNC: 7.5 G/DL — LOW (ref 11.5–15.5)
MCHC RBC-ENTMCNC: 26.8 PG — LOW (ref 27–34)
MCHC RBC-ENTMCNC: 30.6 GM/DL — LOW (ref 32–36)
MCV RBC AUTO: 87.5 FL — SIGNIFICANT CHANGE UP (ref 80–100)
PLATELET # BLD AUTO: 375 K/UL — SIGNIFICANT CHANGE UP (ref 150–400)
POTASSIUM SERPL-MCNC: 4 MMOL/L — SIGNIFICANT CHANGE UP (ref 3.5–5.3)
POTASSIUM SERPL-SCNC: 4 MMOL/L — SIGNIFICANT CHANGE UP (ref 3.5–5.3)
RBC # BLD: 2.8 M/UL — LOW (ref 3.8–5.2)
RBC # FLD: 17.5 % — HIGH (ref 10.3–14.5)
SODIUM SERPL-SCNC: 140 MMOL/L — SIGNIFICANT CHANGE UP (ref 135–145)
WBC # BLD: 7.8 K/UL — SIGNIFICANT CHANGE UP (ref 3.8–10.5)
WBC # FLD AUTO: 7.8 K/UL — SIGNIFICANT CHANGE UP (ref 3.8–10.5)

## 2019-07-20 PROCEDURE — 93010 ELECTROCARDIOGRAM REPORT: CPT

## 2019-07-20 RX ADMIN — ISOSORBIDE DINITRATE 30 MILLIGRAM(S): 5 TABLET ORAL at 13:18

## 2019-07-20 RX ADMIN — LIDOCAINE 1 PATCH: 4 CREAM TOPICAL at 08:20

## 2019-07-20 RX ADMIN — Medication 4: at 17:07

## 2019-07-20 RX ADMIN — ISOSORBIDE DINITRATE 30 MILLIGRAM(S): 5 TABLET ORAL at 06:18

## 2019-07-20 RX ADMIN — PANTOPRAZOLE SODIUM 40 MILLIGRAM(S): 20 TABLET, DELAYED RELEASE ORAL at 06:18

## 2019-07-20 RX ADMIN — Medication 50 MILLIGRAM(S): at 06:18

## 2019-07-20 RX ADMIN — Medication 650 MILLIGRAM(S): at 17:45

## 2019-07-20 RX ADMIN — Medication 11 UNIT(S): at 11:39

## 2019-07-20 RX ADMIN — Medication 650 MILLIGRAM(S): at 09:15

## 2019-07-20 RX ADMIN — Medication 50 MILLIGRAM(S): at 13:18

## 2019-07-20 RX ADMIN — Medication 50 MILLIGRAM(S): at 21:10

## 2019-07-20 RX ADMIN — SENNA PLUS 2 TABLET(S): 8.6 TABLET ORAL at 21:10

## 2019-07-20 RX ADMIN — Medication 11 UNIT(S): at 17:07

## 2019-07-20 RX ADMIN — CHLORHEXIDINE GLUCONATE 1 APPLICATION(S): 213 SOLUTION TOPICAL at 21:12

## 2019-07-20 RX ADMIN — Medication 60 MILLIGRAM(S): at 06:19

## 2019-07-20 RX ADMIN — LIDOCAINE 1 PATCH: 4 CREAM TOPICAL at 08:10

## 2019-07-20 RX ADMIN — Medication 50 MICROGRAM(S): at 06:18

## 2019-07-20 RX ADMIN — Medication 3: at 11:39

## 2019-07-20 RX ADMIN — Medication 81 MILLIGRAM(S): at 11:39

## 2019-07-20 RX ADMIN — LIDOCAINE 1 PATCH: 4 CREAM TOPICAL at 21:10

## 2019-07-20 RX ADMIN — Medication 100 MILLIGRAM(S): at 06:18

## 2019-07-20 RX ADMIN — Medication 60 MILLIGRAM(S): at 17:15

## 2019-07-20 RX ADMIN — ISOSORBIDE DINITRATE 30 MILLIGRAM(S): 5 TABLET ORAL at 21:10

## 2019-07-20 RX ADMIN — Medication 11 UNIT(S): at 08:16

## 2019-07-20 RX ADMIN — ATORVASTATIN CALCIUM 40 MILLIGRAM(S): 80 TABLET, FILM COATED ORAL at 21:10

## 2019-07-20 RX ADMIN — Medication 4: at 08:16

## 2019-07-20 RX ADMIN — Medication 3 MILLIGRAM(S): at 21:10

## 2019-07-20 RX ADMIN — CLOPIDOGREL BISULFATE 75 MILLIGRAM(S): 75 TABLET, FILM COATED ORAL at 11:39

## 2019-07-20 RX ADMIN — Medication 3: at 21:11

## 2019-07-20 RX ADMIN — MONTELUKAST 10 MILLIGRAM(S): 4 TABLET, CHEWABLE ORAL at 11:39

## 2019-07-20 RX ADMIN — INSULIN GLARGINE 28 UNIT(S): 100 INJECTION, SOLUTION SUBCUTANEOUS at 21:11

## 2019-07-20 RX ADMIN — Medication 650 MILLIGRAM(S): at 08:16

## 2019-07-20 RX ADMIN — Medication 650 MILLIGRAM(S): at 17:15

## 2019-07-20 NOTE — PROGRESS NOTE ADULT - ASSESSMENT
Pt is a 74 yo woman with PMHx of HTN, T2DM, CAD s/p CABG (LIMA to LAD, SVG to OM, SVG to PDA 2014 at [facility]), non-dilated ICM (EF 20-25%), severe mitral regurgitation s/p mitral clip (6/13/19 Dr. Newman), severe pulm HTN, CKD, hypothyroidism, who is presenting to ED after experiencing worsening SOB      A/P:      MERVIN  Etiology?  Likely sec to cardiogenic shock  Renal function has improved and stable  PT non -oliguric  continue lasix per cardiology  Optimize glucose control  Monitor renal function   Avoid further nephrotoxics, NSAIDS RCA    CKD stage 4:  baseline Scr 1.8-2.0  Renal function fluctuates sec to CHF  Monitor renal function at present    SOB:  in setting of HF  Continue diuretics per cardiology    Acidosis   sec to RF  Improved  mOnitor serum Co2 at present

## 2019-07-20 NOTE — PROGRESS NOTE ADULT - SUBJECTIVE AND OBJECTIVE BOX
CHIEF COMPLAINT: patient is seen at 7:30 PM her daughter at the bedside she had chest pain last night but no pain-free she is still reporting she is sick not feeling well and decreased by mouth intake    SUBJECTIVE:     REVIEW OF SYSTEMS:    CONSTITUTIONAL: ( x )  weakness,  (  ) fevers or chills  EYES/ENT: (  )visual changes;     NECK: (  ) pain or stiffness  RESPIRATORY:   (  )cough, wheezing, hemoptysis;  (  ) shortness of breath  CARDIOVASCULAR:  (  )chest pain or palpitations  GASTROINTESTINAL:   (  )abdominal or epigastric pain.  (  ) nausea, vomiting, or hematemesis;   (   ) diarrhea or constipation.   GENITOURINARY:   (    ) dysuria, frequency or hematuria  NEUROLOGICAL:  (   ) numbness or weakness   All other review of systems is negative unless indicated above    Vital Signs Last 24 Hrs  T(C): 36.6 (20 Jul 2019 19:54), Max: 36.9 (20 Jul 2019 09:01)  T(F): 97.9 (20 Jul 2019 19:54), Max: 98.4 (20 Jul 2019 09:01)  HR: 69 (20 Jul 2019 19:54) (69 - 75)  BP: 110/57 (20 Jul 2019 19:54) (103/58 - 117/59)  BP(mean): --  RR: 18 (20 Jul 2019 19:54) (18 - 18)  SpO2: 100% (20 Jul 2019 19:54) (95% - 100%)    I&O's Summary    19 Jul 2019 07:01  -  20 Jul 2019 07:00  --------------------------------------------------------  IN: 885 mL / OUT: 1250 mL / NET: -365 mL    20 Jul 2019 07:01  -  20 Jul 2019 23:33  --------------------------------------------------------  IN: 425 mL / OUT: 600 mL / NET: -175 mL        CAPILLARY BLOOD GLUCOSE      POCT Blood Glucose.: 395 mg/dL (20 Jul 2019 20:58)  POCT Blood Glucose.: 335 mg/dL (20 Jul 2019 16:26)  POCT Blood Glucose.: 289 mg/dL (20 Jul 2019 11:32)  POCT Blood Glucose.: 305 mg/dL (20 Jul 2019 08:01)      PHYSICAL EXAM:  General: No distress.  she is feeling weak  HEENT: EOM intact.  Neck: Neck supple. JVP 10-12 elevated. No masses  Chest: BL posterior lower lobe rales  CV: RRR, Normal S1 and S2. No murmurs, rub, or gallops. Radial pulses normal. No LE edema  Abdomen: Soft, mildly distended, non-tender  Skin: No rashes or skin breakdown  Neurology: Alert and oriented times three. Sensation intact  Psych:  depresses        MEDICATIONS:  MEDICATIONS  (STANDING):  aspirin enteric coated 81 milliGRAM(s) Oral daily  atorvastatin 40 milliGRAM(s) Oral at bedtime  chlorhexidine 2% Cloths 1 Application(s) Topical daily  clopidogrel Tablet 75 milliGRAM(s) Oral daily  dextrose 5%. 1000 milliLiter(s) (50 mL/Hr) IV Continuous <Continuous>  dextrose 50% Injectable 12.5 Gram(s) IV Push once  dextrose 50% Injectable 25 Gram(s) IV Push once  dextrose 50% Injectable 25 Gram(s) IV Push once  docusate sodium 100 milliGRAM(s) Oral two times a day  furosemide   Injectable 60 milliGRAM(s) IV Push two times a day  hydrALAZINE 50 milliGRAM(s) Oral every 8 hours  insulin glargine Injectable (LANTUS) 28 Unit(s) SubCutaneous at bedtime  insulin lispro (HumaLOG) corrective regimen sliding scale   SubCutaneous three times a day before meals  insulin lispro (HumaLOG) corrective regimen sliding scale   SubCutaneous at bedtime  insulin lispro Injectable (HumaLOG) 11 Unit(s) SubCutaneous three times a day before meals  isosorbide   dinitrate Tablet (ISORDIL) 30 milliGRAM(s) Oral three times a day  levothyroxine 50 MICROGram(s) Oral daily  lidocaine   Patch 1 Patch Transdermal daily  melatonin 3 milliGRAM(s) Oral at bedtime  montelukast 10 milliGRAM(s) Oral daily  pantoprazole    Tablet 40 milliGRAM(s) Oral before breakfast  polyethylene glycol 3350 17 Gram(s) Oral daily  senna 2 Tablet(s) Oral at bedtime      LABS: All Labs Reviewed:                        7.5    7.80  )-----------( 375      ( 20 Jul 2019 10:57 )             24.5     07-20    140  |  101  |  63<H>  ----------------------------<  269<H>  4.0   |  21<L>  |  1.77<H>    Ca    9.1      20 Jul 2019 06:45            Blood Culture:   Urine Culture      RADIOLOGY/EKG:    ASSESSMENT AND PLAN:  Pt is a 74 yo woman with PMHx of HTN, T2DM (A1c 7.4%), CAD s/p CABG (LIMA to LAD, SVG to OM, SVG to PDA 2014 at [facility]), non-dilated ICM (EF 20-25%), severe mitral regurgitation s/p mitral clip (6/13/19 Dr. Newman), severe pulm HTN, CKD, hypothyroidism, who is presenting to ED after experiencing worsening SOB and intermittent chest [facility] at [facility]. Of note, pt was recently discharged on 6/19 after having mitral clip procedure completed. She initially required BiPAP with improvement in her respiratory status following diuresis. Initiated on vasopressors and inotropes in CCU, which were subsequently weaned off. Infectious work up was negative.    She remains volume overloaded with NYHA Class IIIb symptoms. She is not adequately responding to current diuretic regimen. Her weight is unchanged today despite increase in diuretics. Her Scrt is continuing to downtrend and now close to baseline. She is normotensive tolerating uptitration of vasodilators.     Problem/Plan - 1:  ·  Problem: Acute on chronic systolic heart failure.  Plan: - Please increase ISDN to 20 mg PO TID, hold for SBP < 90  -  on lasix to 60 mg IV BID Patient condition seems to be imving  - Continue hydralazine 50mg PO TID, hold for SBP < 90  - No ARB/ARNI/MRA at this time due to renal dysfunction  - Will hold on initiation of beta block until euvolemic  - Daily standing weights, strict I &Os.     Problem/Plan - 2:  ·  Problem: Severe mitral regurgitation.  Plan: - s/p Mitral clip   - TTE on 7/9: mild MR.     Problem/Plan - 3:  ·  Problem:  DM insulin glargine Injectable (LANTUS) 28 Unit(s) SubCutaneous at bedtime  insulin lispro (HumaLOG) corrective regimen sliding scale   SubCutaneous three times a day before meals  insulin lispro (HumaLOG) corrective regimen sliding scale   SubCutaneous at bedtime  insulin lispro Injectable (HumaLOG) 11 Unit(s) SubCutaneous three times a day before meals        Problem/Plan - 4:  ·  Problem: SVT (supraventricular tachycardia).  Plan: - Currently SR  - Continue to monitor on tele    -  CAD (coronary artery disease).  Plan: -Continue ASA, clopidogrel.  - Recheck digoxin level on Friday 7/19 as it was critically high on 7/16.     Problem/Plan - 5:  ·  Problem: Acute renal failure.  Plan: - Improving  - Diuresis as above  - Avoid nephrotoxins.   DVT PPX:    ADVANCED DIRECTIVE:    DISPOSITION: REHAB? DW team also discussed with her daughter at the bedside tonight in detail she agreed with short-term rehab  DVT PPX:    ADVANCED DIRECTIVE:    DISPOSITION:

## 2019-07-20 NOTE — PROGRESS NOTE ADULT - SUBJECTIVE AND OBJECTIVE BOX
Dr. Muhammad  Office [phone]  Cell [phone]  [facility]  Cell [phone]      Patient is a 73y old  Female who presents with a chief complaint of Hypoxia, SOB (19 Jul 2019 16:39)      Patient seen and examined at bedside. No chest pain/sob    VITALS:  T(F): 98.2 (07-20-19 @ 12:23), Max: 98.4 (07-20-19 @ 09:01)  HR: 74 (07-20-19 @ 12:23)  BP: 103/58 (07-20-19 @ 12:23)  RR: 18 (07-20-19 @ 12:23)  SpO2: 95% (07-20-19 @ 12:23)  Wt(kg): --    07-19 @ 07:01  -  07-20 @ 07:00  --------------------------------------------------------  IN: 885 mL / OUT: 1250 mL / NET: -365 mL    07-20 @ 07:01  -  07-20 @ 12:51  --------------------------------------------------------  IN: 425 mL / OUT: 600 mL / NET: -175 mL          PHYSICAL EXAM:  Constitutional: NAD  Neck: No JVD  Respiratory: Bilateral decreased air entry at base  Cardiovascular: S1, S2, RRR  Gastrointestinal: BS+, soft, NT/ND  Extremities: No peripheral edema    Hospital Medications:   MEDICATIONS  (STANDING):  aspirin enteric coated 81 milliGRAM(s) Oral daily  atorvastatin 40 milliGRAM(s) Oral at bedtime  chlorhexidine 2% Cloths 1 Application(s) Topical daily  clopidogrel Tablet 75 milliGRAM(s) Oral daily  dextrose 5%. 1000 milliLiter(s) (50 mL/Hr) IV Continuous <Continuous>  dextrose 50% Injectable 12.5 Gram(s) IV Push once  dextrose 50% Injectable 25 Gram(s) IV Push once  dextrose 50% Injectable 25 Gram(s) IV Push once  docusate sodium 100 milliGRAM(s) Oral two times a day  furosemide   Injectable 60 milliGRAM(s) IV Push two times a day  hydrALAZINE 50 milliGRAM(s) Oral every 8 hours  insulin glargine Injectable (LANTUS) 28 Unit(s) SubCutaneous at bedtime  insulin lispro (HumaLOG) corrective regimen sliding scale   SubCutaneous three times a day before meals  insulin lispro (HumaLOG) corrective regimen sliding scale   SubCutaneous at bedtime  insulin lispro Injectable (HumaLOG) 11 Unit(s) SubCutaneous three times a day before meals  isosorbide   dinitrate Tablet (ISORDIL) 30 milliGRAM(s) Oral three times a day  levothyroxine 50 MICROGram(s) Oral daily  lidocaine   Patch 1 Patch Transdermal daily  melatonin 3 milliGRAM(s) Oral at bedtime  montelukast 10 milliGRAM(s) Oral daily  pantoprazole    Tablet 40 milliGRAM(s) Oral before breakfast  polyethylene glycol 3350 17 Gram(s) Oral daily  senna 2 Tablet(s) Oral at bedtime      LABS:  07-20    140  |  101  |  63<H>  ----------------------------<  269<H>  4.0   |  21<L>  |  1.77<H>    Ca    9.1      20 Jul 2019 06:45      Creatinine Trend: 1.77 <--, 1.78 <--, 1.92 <--, 2.02 <--, 2.31 <--, 2.45 <--, 2.59 <--                                7.5    7.80  )-----------( 375      ( 20 Jul 2019 10:57 )             24.5     Urine Studies:  Urinalysis - [07-09-19 @ 13:28]      Color Yellow / Appearance Clear / SG 1.012 / pH 6.0      Gluc Negative / Ketone Negative  / Bili Negative / Urobili Negative       Blood Trace / Protein Trace / Leuk Est Negative / Nitrite Negative      RBC 1 / WBC 1 / Hyaline 0 / Gran  / Sq Epi  / Non Sq Epi 0 / Bacteria Moderate      Iron 42, TIBC 348, %sat 12      [07-05-19 @ 22:32]  Ferritin 130      [07-05-19 @ 22:32]  .4 (Ca --)      [04-25-19 @ 05:45]   --  PTH 43.55 (Ca --)      [09-10-18 @ 07:15]   --  PTH 36.60 (Ca --)      [09-08-18 @ 03:15]   --  Vitamin D (25OH) 25.9      [05-01-19 @ 04:00]  HbA1c 7.4      [07-05-19 @ 22:32]  TSH 2.00      [07-05-19 @ 22:32]  Lipid: chol 148, , HDL 35,       [06-01-19 @ 08:00]        RADIOLOGY & ADDITIONAL STUDIES:

## 2019-07-20 NOTE — PROVIDER CONTACT NOTE (OTHER) - ACTION/TREATMENT ORDERED:
PA saw patient and assessed  An extra 350 tylenol was given   Pt was resting  PA was told that chest pain has been the same for the past couple of days   Will continue to monitor

## 2019-07-21 LAB
ANION GAP SERPL CALC-SCNC: 19 MMOL/L — HIGH (ref 5–17)
BUN SERPL-MCNC: 58 MG/DL — HIGH (ref 7–23)
CALCIUM SERPL-MCNC: 8.6 MG/DL — SIGNIFICANT CHANGE UP (ref 8.4–10.5)
CHLORIDE SERPL-SCNC: 99 MMOL/L — SIGNIFICANT CHANGE UP (ref 96–108)
CO2 SERPL-SCNC: 20 MMOL/L — LOW (ref 22–31)
CREAT SERPL-MCNC: <0.3 MG/DL — LOW (ref 0.5–1.3)
GLUCOSE BLDC GLUCOMTR-MCNC: 351 MG/DL — HIGH (ref 70–99)
GLUCOSE BLDC GLUCOMTR-MCNC: 360 MG/DL — HIGH (ref 70–99)
GLUCOSE BLDC GLUCOMTR-MCNC: 372 MG/DL — HIGH (ref 70–99)
GLUCOSE BLDC GLUCOMTR-MCNC: 392 MG/DL — HIGH (ref 70–99)
GLUCOSE SERPL-MCNC: 332 MG/DL — HIGH (ref 70–99)
HCT VFR BLD CALC: 23.6 % — LOW (ref 34.5–45)
HGB BLD-MCNC: 7.3 G/DL — LOW (ref 11.5–15.5)
MAGNESIUM SERPL-MCNC: 2 MG/DL — SIGNIFICANT CHANGE UP (ref 1.6–2.6)
MCHC RBC-ENTMCNC: 27.2 PG — SIGNIFICANT CHANGE UP (ref 27–34)
MCHC RBC-ENTMCNC: 30.9 GM/DL — LOW (ref 32–36)
MCV RBC AUTO: 88.1 FL — SIGNIFICANT CHANGE UP (ref 80–100)
PLATELET # BLD AUTO: 364 K/UL — SIGNIFICANT CHANGE UP (ref 150–400)
POTASSIUM SERPL-MCNC: 3.8 MMOL/L — SIGNIFICANT CHANGE UP (ref 3.5–5.3)
POTASSIUM SERPL-SCNC: 3.8 MMOL/L — SIGNIFICANT CHANGE UP (ref 3.5–5.3)
RBC # BLD: 2.68 M/UL — LOW (ref 3.8–5.2)
RBC # FLD: 17.6 % — HIGH (ref 10.3–14.5)
SODIUM SERPL-SCNC: 138 MMOL/L — SIGNIFICANT CHANGE UP (ref 135–145)
WBC # BLD: 8.14 K/UL — SIGNIFICANT CHANGE UP (ref 3.8–10.5)
WBC # FLD AUTO: 8.14 K/UL — SIGNIFICANT CHANGE UP (ref 3.8–10.5)

## 2019-07-21 RX ORDER — INSULIN LISPRO 100/ML
15 VIAL (ML) SUBCUTANEOUS
Refills: 0 | Status: DISCONTINUED | OUTPATIENT
Start: 2019-07-21 | End: 2019-07-23

## 2019-07-21 RX ORDER — INSULIN GLARGINE 100 [IU]/ML
32 INJECTION, SOLUTION SUBCUTANEOUS AT BEDTIME
Refills: 0 | Status: DISCONTINUED | OUTPATIENT
Start: 2019-07-21 | End: 2019-07-23

## 2019-07-21 RX ADMIN — ISOSORBIDE DINITRATE 30 MILLIGRAM(S): 5 TABLET ORAL at 05:52

## 2019-07-21 RX ADMIN — INSULIN GLARGINE 32 UNIT(S): 100 INJECTION, SOLUTION SUBCUTANEOUS at 21:52

## 2019-07-21 RX ADMIN — Medication 11 UNIT(S): at 07:59

## 2019-07-21 RX ADMIN — CLOPIDOGREL BISULFATE 75 MILLIGRAM(S): 75 TABLET, FILM COATED ORAL at 12:13

## 2019-07-21 RX ADMIN — Medication 60 MILLIGRAM(S): at 17:39

## 2019-07-21 RX ADMIN — LIDOCAINE 1 PATCH: 4 CREAM TOPICAL at 21:51

## 2019-07-21 RX ADMIN — Medication 50 MILLIGRAM(S): at 05:52

## 2019-07-21 RX ADMIN — Medication 100 MILLIGRAM(S): at 05:52

## 2019-07-21 RX ADMIN — Medication 15 UNIT(S): at 16:49

## 2019-07-21 RX ADMIN — Medication 60 MILLIGRAM(S): at 05:52

## 2019-07-21 RX ADMIN — Medication 5: at 12:12

## 2019-07-21 RX ADMIN — Medication 3 MILLIGRAM(S): at 21:52

## 2019-07-21 RX ADMIN — CHLORHEXIDINE GLUCONATE 1 APPLICATION(S): 213 SOLUTION TOPICAL at 21:51

## 2019-07-21 RX ADMIN — Medication 50 MILLIGRAM(S): at 13:22

## 2019-07-21 RX ADMIN — Medication 5: at 16:48

## 2019-07-21 RX ADMIN — ATORVASTATIN CALCIUM 40 MILLIGRAM(S): 80 TABLET, FILM COATED ORAL at 21:52

## 2019-07-21 RX ADMIN — Medication 3: at 21:52

## 2019-07-21 RX ADMIN — MONTELUKAST 10 MILLIGRAM(S): 4 TABLET, CHEWABLE ORAL at 12:13

## 2019-07-21 RX ADMIN — SENNA PLUS 2 TABLET(S): 8.6 TABLET ORAL at 21:52

## 2019-07-21 RX ADMIN — Medication 11 UNIT(S): at 12:12

## 2019-07-21 RX ADMIN — ISOSORBIDE DINITRATE 30 MILLIGRAM(S): 5 TABLET ORAL at 21:52

## 2019-07-21 RX ADMIN — ISOSORBIDE DINITRATE 30 MILLIGRAM(S): 5 TABLET ORAL at 13:22

## 2019-07-21 RX ADMIN — Medication 81 MILLIGRAM(S): at 12:13

## 2019-07-21 RX ADMIN — Medication 5: at 07:59

## 2019-07-21 RX ADMIN — Medication 50 MILLIGRAM(S): at 21:52

## 2019-07-21 RX ADMIN — Medication 100 MILLIGRAM(S): at 17:40

## 2019-07-21 RX ADMIN — LIDOCAINE 1 PATCH: 4 CREAM TOPICAL at 09:00

## 2019-07-21 RX ADMIN — LIDOCAINE 1 PATCH: 4 CREAM TOPICAL at 07:34

## 2019-07-21 RX ADMIN — PANTOPRAZOLE SODIUM 40 MILLIGRAM(S): 20 TABLET, DELAYED RELEASE ORAL at 05:52

## 2019-07-21 RX ADMIN — Medication 50 MICROGRAM(S): at 05:52

## 2019-07-21 NOTE — PROGRESS NOTE ADULT - SUBJECTIVE AND OBJECTIVE BOX
CHIEF COMPLAINT:  Patient was seen at 8 AM awake and verbal she was sitting out of bed to chair and trying to eat  her breakfast  SUBJECTIVE:     REVIEW OF SYSTEMS:    CONSTITUTIONAL: ( x)  weakness,  (  ) fevers or chills  EYES/ENT: (  )visual changes;     NECK: (  ) pain or stiffness  RESPIRATORY:   (  )cough, wheezing, hemoptysis;  (  ) shortness of breath  CARDIOVASCULAR:  (  )chest pain or palpitations  GASTROINTESTINAL:   (  )abdominal or epigastric pain.  (  ) nausea, vomiting, or hematemesis;   (   ) diarrhea or constipation.   GENITOURINARY:   (    ) dysuria, frequency or hematuria  NEUROLOGICAL:  (   ) numbness or weakness   All other review of systems is negative unless indicated above    Vital Signs Last 24 Hrs  T(C): 36.8 (21 Jul 2019 12:15), Max: 36.8 (21 Jul 2019 04:30)  T(F): 98.3 (21 Jul 2019 12:15), Max: 98.3 (21 Jul 2019 12:15)  HR: 73 (21 Jul 2019 12:15) (69 - 77)  BP: 107/62 (21 Jul 2019 12:15) (107/62 - 111/59)  BP(mean): --  RR: 18 (21 Jul 2019 12:15) (18 - 18)  SpO2: 100% (21 Jul 2019 12:15) (100% - 100%)    I&O's Summary    20 Jul 2019 07:01  -  21 Jul 2019 07:00  --------------------------------------------------------  IN: 725 mL / OUT: 1200 mL / NET: -475 mL    21 Jul 2019 07:01  -  21 Jul 2019 16:14  --------------------------------------------------------  IN: 480 mL / OUT: 700 mL / NET: -220 mL        CAPILLARY BLOOD GLUCOSE      POCT Blood Glucose.: 392 mg/dL (21 Jul 2019 12:10)  POCT Blood Glucose.: 372 mg/dL (21 Jul 2019 07:38)  POCT Blood Glucose.: 395 mg/dL (20 Jul 2019 20:58)  POCT Blood Glucose.: 335 mg/dL (20 Jul 2019 16:26)      PHYSICAL EXAM:    Constitutional:  ( x  ) NAD,   ( x  )awake    HEENT: PERR, EOMI,    Neck: Soft and supple, No LAD, No JVD  Respiratory:  (  x  Breath sounds are clear bilaterally,    (   ) wheezing, rales or rhonchi  Cardiovascular:     ( x  )S1 and S2,    Gastrointestinal:  (  x )Bowel Sounds present, soft,   (  )nontender, nondistended,    Extremities:    (  ) peripheral edema  Vascular: 2+ peripheral pulses  Neurological:    (  x  )A/O ,   (  ) focal deficits  Musculoskeletal:    (  x )  normal strength b/l upper  (     ) normal  lower extremities  Skin: No rashes    MEDICATIONS:  MEDICATIONS  (STANDING):  aspirin enteric coated 81 milliGRAM(s) Oral daily  atorvastatin 40 milliGRAM(s) Oral at bedtime  chlorhexidine 2% Cloths 1 Application(s) Topical daily  clopidogrel Tablet 75 milliGRAM(s) Oral daily  dextrose 5%. 1000 milliLiter(s) (50 mL/Hr) IV Continuous <Continuous>  dextrose 50% Injectable 12.5 Gram(s) IV Push once  dextrose 50% Injectable 25 Gram(s) IV Push once  dextrose 50% Injectable 25 Gram(s) IV Push once  docusate sodium 100 milliGRAM(s) Oral two times a day  furosemide   Injectable 60 milliGRAM(s) IV Push two times a day  hydrALAZINE 50 milliGRAM(s) Oral every 8 hours  insulin glargine Injectable (LANTUS) 32 Unit(s) SubCutaneous at bedtime  insulin lispro (HumaLOG) corrective regimen sliding scale   SubCutaneous three times a day before meals  insulin lispro (HumaLOG) corrective regimen sliding scale   SubCutaneous at bedtime  insulin lispro Injectable (HumaLOG) 15 Unit(s) SubCutaneous three times a day before meals  isosorbide   dinitrate Tablet (ISORDIL) 30 milliGRAM(s) Oral three times a day  levothyroxine 50 MICROGram(s) Oral daily  lidocaine   Patch 1 Patch Transdermal daily  melatonin 3 milliGRAM(s) Oral at bedtime  montelukast 10 milliGRAM(s) Oral daily  pantoprazole    Tablet 40 milliGRAM(s) Oral before breakfast  polyethylene glycol 3350 17 Gram(s) Oral daily  senna 2 Tablet(s) Oral at bedtime      LABS: All Labs Reviewed:                        7.3    8.14  )-----------( 364      ( 21 Jul 2019 10:15 )             23.6     07-21    138  |  99  |  58<H>  ----------------------------<  332<H>  3.8   |  20<L>  |  <0.30<L>    Ca    8.6      21 Jul 2019 06:32  Mg     2.0     07-21                 7.5    7.80  )-----------( 375      ( 20 Jul 2019 10:57 )             24.5     07-20    140  |  101  |  63<H>  ----------------------------<  269<H>  4.0   |  21<L>  |  1.77<H>    Ca    9.1      20 Jul 2019 06:45      Blood Culture:   Urine Culture      RADIOLOGY/EKG:    ASSESSMENT AND PLAN:    Pt is a 74 yo woman with PMHx of HTN, T2DM (A1c 7.4%), CAD s/p CABG (LIMA to LAD, SVG to OM, SVG to PDA 2014 at [facility]), non-dilated ICM (EF 20-25%), severe mitral regurgitation s/p mitral clip (6/13/19 Dr. Newman), severe pulm HTN, CKD, hypothyroidism, who is presenting to ED after [facility] worsening SO[facility] and [facility] Of note, pt was recently discharged on 6/19 after having mitral clip procedure completed. She initially required BiPAP with improvement in her respiratory status following diuresis. Initiated on vasopressors and inotropes in CCU, which were subsequently weaned off. Infectious work up was negative.    She remains volume overloaded with NYHA Class IIIb symptoms. She is not adequately responding to current diuretic regimen. Her weight is unchanged today despite increase in diuretics. Her Scrt is continuing to downtrend and now close to baseline. She is normotensive tolerating uptitration of vasodilators.     Problem/Plan - 1:  ·  Problem: Acute on chronic systolic heart failure.  Plan: - Please increase ISDN to 20 mg PO TID, hold for SBP < 90  -  on lasix to 60 mg IV BID Patient condition seems to be imving  - Continue hydralazine 50mg PO TID, hold for SBP < 90  - No ARB/ARNI/MRA at this time due to renal dysfunction  - Will hold on initiation of beta block until euvolemic  - Daily standing weights, strict I &Os.     Problem/Plan - 2:  ·  Problem: Severe mitral regurgitation.  Plan: - s/p Mitral clip   - TTE on 7/9: mild MR.     Problem/Plan - 3:  ·  Problem:  DM insulin glargine Injectable (LANTUS) 32 Unit(s) SubCutaneous at bedtime  insulin lispro (HumaLOG) corrective regimen sliding scale   SubCutaneous three times a day before meals  insulin lispro (HumaLOG) corrective regimen sliding scale   SubCutaneous at bedtime  insulin lispro Injectable (HumaLOG) 11 Unit(s) SubCutaneous three times a day before meals        Problem/Plan - 4:  ·  Problem: SVT (supraventricular tachycardia).  Plan: - Currently SR  - Continue to monitor on tele    -  CAD (coronary artery disease).  Plan: -Continue ASA, clopidogrel.  - Recheck digoxin level on Friday 7/19 as it was critically high on 7/16.     Problem/Plan - 5:  ·  Problem: Acute renal failure/Anemia discussed with the team to ask heart failure that if patient can be getting  2 half a unit blood transfusion.    - Diuresis as above  - Avoid nephrotoxins.   DVT PPX:    ADVANCED DIRECTIVE:    DISPOSITION: REHAB? DW team also discussed with her daughter at the bedside greed with short-term rehab    ADVANCED DIRECTIVE:    DISPOSITION:

## 2019-07-21 NOTE — PROGRESS NOTE ADULT - ASSESSMENT
Pt is a 72 yo woman with PMHx of HTN, T2DM, CAD s/p CABG (LIMA to LAD, SVG to OM, SVG to PDA 2014 at [facility]), non-dilated ICM (EF 20-25%), severe mitral regurgitation s/p mitral clip (6/13/19 Dr. Newman), severe pulm HTN, CKD, hypothyroidism, who is presenting to ED after experiencing worsening SOB      A/P:      MERVIN  Etiology?  Likely sec to cardiogenic shock  Renal function stable  PT non -oliguric  continue lasix per cardiology  Optimize glucose control  Monitor renal function   Avoid further nephrotoxics, NSAIDS RCA    CKD stage 4:  baseline Scr 1.8-2.0  Renal function fluctuates sec to CHF  Monitor renal function at present    SOB:  in setting of HF  Continue diuretics per cardiology    Acidosis   sec to RF  Improved  mOnitor serum Co2 at present

## 2019-07-21 NOTE — PROGRESS NOTE ADULT - SUBJECTIVE AND OBJECTIVE BOX
[facility]   MD RAHEEL SHAH MD RUORU WONG, PA    TEL:  OFFICE: [phone]  DR SANTOYO CELL: [phone]  JUSTEN KENDALL CELL: [phone]  DR. NGUYEN CELL: [phone]    RENAL FOLLOW UP NOTE  --------------------------------------------------------------------------------  HPI:      Pt seen and examined at bedside.     PAST HISTORY  --------------------------------------------------------------------------------  No significant changes to PMH, PSH, FHx, SHx, unless otherwise noted    ALLERGIES & MEDICATIONS  --------------------------------------------------------------------------------  Allergies    azithromycin (Hives; Pruritus)    Intolerances      Standing Inpatient Medications  aspirin enteric coated 81 milliGRAM(s) Oral daily  atorvastatin 40 milliGRAM(s) Oral at bedtime  chlorhexidine 2% Cloths 1 Application(s) Topical daily  clopidogrel Tablet 75 milliGRAM(s) Oral daily  dextrose 5%. 1000 milliLiter(s) IV Continuous <Continuous>  dextrose 50% Injectable 12.5 Gram(s) IV Push once  dextrose 50% Injectable 25 Gram(s) IV Push once  dextrose 50% Injectable 25 Gram(s) IV Push once  docusate sodium 100 milliGRAM(s) Oral two times a day  furosemide   Injectable 60 milliGRAM(s) IV Push two times a day  hydrALAZINE 50 milliGRAM(s) Oral every 8 hours  insulin glargine Injectable (LANTUS) 28 Unit(s) SubCutaneous at bedtime  insulin lispro (HumaLOG) corrective regimen sliding scale   SubCutaneous three times a day before meals  insulin lispro (HumaLOG) corrective regimen sliding scale   SubCutaneous at bedtime  insulin lispro Injectable (HumaLOG) 11 Unit(s) SubCutaneous three times a day before meals  isosorbide   dinitrate Tablet (ISORDIL) 30 milliGRAM(s) Oral three times a day  levothyroxine 50 MICROGram(s) Oral daily  lidocaine   Patch 1 Patch Transdermal daily  melatonin 3 milliGRAM(s) Oral at bedtime  montelukast 10 milliGRAM(s) Oral daily  pantoprazole    Tablet 40 milliGRAM(s) Oral before breakfast  polyethylene glycol 3350 17 Gram(s) Oral daily  senna 2 Tablet(s) Oral at bedtime    PRN Inpatient Medications  acetaminophen   Tablet .. 650 milliGRAM(s) Oral every 6 hours PRN  dextrose 40% Gel 15 Gram(s) Oral once PRN  glucagon  Injectable 1 milliGRAM(s) IntraMuscular once PRN      REVIEW OF SYSTEMS  --------------------------------------------------------------------------------  General: no fever  CVS: no chest pain  RESP: intermittent  sob, no cough  ABD: no abdominal pain  : no dysuria,  MSK: no edema     VITALS/PHYSICAL EXAM  --------------------------------------------------------------------------------  T(C): 36.8 (07-21-19 @ 12:15), Max: 36.8 (07-21-19 @ 04:30)  HR: 73 (07-21-19 @ 12:15) (69 - 77)  BP: 107/62 (07-21-19 @ 12:15) (107/62 - 111/59)  RR: 18 (07-21-19 @ 12:15) (18 - 18)  SpO2: 100% (07-21-19 @ 12:15) (100% - 100%)  Wt(kg): --        07-20-19 @ 07:01  -  07-21-19 @ 07:00  --------------------------------------------------------  IN: 725 mL / OUT: 1200 mL / NET: -475 mL    07-21-19 @ 07:01  -  07-21-19 @ 13:23  --------------------------------------------------------  IN: 480 mL / OUT: 700 mL / NET: -220 mL      Physical Exam:  	Gen: NAD  	HEENT: MMM  	Pulm: decreased breath sounds B/L  	CV: S1S2  	Abd: Soft, +BS  	Ext: No LE edema B/L                      Neuro: Awake   	Skin: Warm and Dry   	 no polo    LABS/STUDIES  --------------------------------------------------------------------------------              7.3    8.14  >-----------<  364      [07-21-19 @ 10:15]              23.6     138  |  99  |  58  ----------------------------<  332      [07-21-19 @ 06:32]  3.8   |  20  |  <0.30        Ca     8.6     [07-21-19 @ 06:32]      Mg     2.0     [07-21-19 @ 06:32]            Creatinine Trend:  SCr <0.30 [07-21 @ 06:32]  SCr 1.77 [07-20 @ 06:45]  SCr 1.78 [07-19 @ 07:00]  SCr 1.92 [07-18 @ 05:13]  SCr 2.02 [07-17 @ 06:35]    Urinalysis - [07-09-19 @ 13:28]      Color Yellow / Appearance Clear / SG 1.012 / pH 6.0      Gluc Negative / Ketone Negative  / Bili Negative / Urobili Negative       Blood Trace / Protein Trace / Leuk Est Negative / Nitrite Negative      RBC 1 / WBC 1 / Hyaline 0 / Gran  / Sq Epi  / Non Sq Epi 0 / Bacteria Moderate      Iron 42, TIBC 348, %sat 12      [07-05-19 @ 22:32]  Ferritin 130      [07-05-19 @ 22:32]  .4 (Ca --)      [04-25-19 @ 05:45]   --  PTH 43.55 (Ca --)      [09-10-18 @ 07:15]   --  PTH 36.60 (Ca --)      [09-08-18 @ 03:15]   --  Vitamin D (25OH) 25.9      [05-01-19 @ 04:00]  HbA1c 7.4      [07-05-19 @ 22:32]  TSH 2.00      [07-05-19 @ 22:32]  Lipid: chol 148, , HDL 35,       [06-01-19 @ 08:00]

## 2019-07-22 DIAGNOSIS — D64.9 ANEMIA, UNSPECIFIED: ICD-10-CM

## 2019-07-22 LAB
ANION GAP SERPL CALC-SCNC: 15 MMOL/L — SIGNIFICANT CHANGE UP (ref 5–17)
BLD GP AB SCN SERPL QL: NEGATIVE — SIGNIFICANT CHANGE UP
BUN SERPL-MCNC: 51 MG/DL — HIGH (ref 7–23)
CALCIUM SERPL-MCNC: 8.5 MG/DL — SIGNIFICANT CHANGE UP (ref 8.4–10.5)
CHLORIDE SERPL-SCNC: 98 MMOL/L — SIGNIFICANT CHANGE UP (ref 96–108)
CO2 SERPL-SCNC: 23 MMOL/L — SIGNIFICANT CHANGE UP (ref 22–31)
CREAT SERPL-MCNC: 1.67 MG/DL — HIGH (ref 0.5–1.3)
GLUCOSE BLDC GLUCOMTR-MCNC: 285 MG/DL — HIGH (ref 70–99)
GLUCOSE BLDC GLUCOMTR-MCNC: 335 MG/DL — HIGH (ref 70–99)
GLUCOSE BLDC GLUCOMTR-MCNC: 347 MG/DL — HIGH (ref 70–99)
GLUCOSE BLDC GLUCOMTR-MCNC: 350 MG/DL — HIGH (ref 70–99)
GLUCOSE SERPL-MCNC: 295 MG/DL — HIGH (ref 70–99)
HCT VFR BLD CALC: 22.8 % — LOW (ref 34.5–45)
HGB BLD-MCNC: 7.6 G/DL — LOW (ref 11.5–15.5)
MAGNESIUM SERPL-MCNC: 2 MG/DL — SIGNIFICANT CHANGE UP (ref 1.6–2.6)
MCHC RBC-ENTMCNC: 28.5 PG — SIGNIFICANT CHANGE UP (ref 27–34)
MCHC RBC-ENTMCNC: 33.4 GM/DL — SIGNIFICANT CHANGE UP (ref 32–36)
MCV RBC AUTO: 85.1 FL — SIGNIFICANT CHANGE UP (ref 80–100)
PHOSPHATE SERPL-MCNC: 2.7 MG/DL — SIGNIFICANT CHANGE UP (ref 2.5–4.5)
PLATELET # BLD AUTO: 368 K/UL — SIGNIFICANT CHANGE UP (ref 150–400)
POTASSIUM SERPL-MCNC: 3.7 MMOL/L — SIGNIFICANT CHANGE UP (ref 3.5–5.3)
POTASSIUM SERPL-SCNC: 3.7 MMOL/L — SIGNIFICANT CHANGE UP (ref 3.5–5.3)
RBC # BLD: 2.68 M/UL — LOW (ref 3.8–5.2)
RBC # FLD: 17 % — HIGH (ref 10.3–14.5)
RH IG SCN BLD-IMP: POSITIVE — SIGNIFICANT CHANGE UP
SODIUM SERPL-SCNC: 136 MMOL/L — SIGNIFICANT CHANGE UP (ref 135–145)
WBC # BLD: 8.6 K/UL — SIGNIFICANT CHANGE UP (ref 3.8–10.5)
WBC # FLD AUTO: 8.6 K/UL — SIGNIFICANT CHANGE UP (ref 3.8–10.5)

## 2019-07-22 PROCEDURE — 99233 SBSQ HOSP IP/OBS HIGH 50: CPT

## 2019-07-22 RX ORDER — FUROSEMIDE 40 MG
40 TABLET ORAL ONCE
Refills: 0 | Status: COMPLETED | OUTPATIENT
Start: 2019-07-22 | End: 2019-07-22

## 2019-07-22 RX ADMIN — LIDOCAINE 1 PATCH: 4 CREAM TOPICAL at 08:15

## 2019-07-22 RX ADMIN — Medication 15 UNIT(S): at 08:11

## 2019-07-22 RX ADMIN — Medication 50 MILLIGRAM(S): at 14:03

## 2019-07-22 RX ADMIN — Medication 15 UNIT(S): at 17:15

## 2019-07-22 RX ADMIN — ISOSORBIDE DINITRATE 30 MILLIGRAM(S): 5 TABLET ORAL at 05:58

## 2019-07-22 RX ADMIN — Medication 3 MILLIGRAM(S): at 21:11

## 2019-07-22 RX ADMIN — LIDOCAINE 1 PATCH: 4 CREAM TOPICAL at 07:15

## 2019-07-22 RX ADMIN — Medication 81 MILLIGRAM(S): at 12:12

## 2019-07-22 RX ADMIN — INSULIN GLARGINE 32 UNIT(S): 100 INJECTION, SOLUTION SUBCUTANEOUS at 21:11

## 2019-07-22 RX ADMIN — Medication 100 MILLIGRAM(S): at 06:00

## 2019-07-22 RX ADMIN — Medication 100 MILLIGRAM(S): at 17:20

## 2019-07-22 RX ADMIN — Medication 2: at 21:11

## 2019-07-22 RX ADMIN — ATORVASTATIN CALCIUM 40 MILLIGRAM(S): 80 TABLET, FILM COATED ORAL at 21:11

## 2019-07-22 RX ADMIN — CLOPIDOGREL BISULFATE 75 MILLIGRAM(S): 75 TABLET, FILM COATED ORAL at 12:12

## 2019-07-22 RX ADMIN — Medication 50 MICROGRAM(S): at 05:58

## 2019-07-22 RX ADMIN — PANTOPRAZOLE SODIUM 40 MILLIGRAM(S): 20 TABLET, DELAYED RELEASE ORAL at 06:00

## 2019-07-22 RX ADMIN — Medication 60 MILLIGRAM(S): at 17:41

## 2019-07-22 RX ADMIN — Medication 4: at 17:14

## 2019-07-22 RX ADMIN — ISOSORBIDE DINITRATE 30 MILLIGRAM(S): 5 TABLET ORAL at 14:03

## 2019-07-22 RX ADMIN — Medication 50 MILLIGRAM(S): at 05:59

## 2019-07-22 RX ADMIN — Medication 4: at 08:11

## 2019-07-22 RX ADMIN — Medication 40 MILLIGRAM(S): at 18:58

## 2019-07-22 RX ADMIN — Medication 50 MILLIGRAM(S): at 21:11

## 2019-07-22 RX ADMIN — Medication 3: at 12:18

## 2019-07-22 RX ADMIN — LIDOCAINE 1 PATCH: 4 CREAM TOPICAL at 21:22

## 2019-07-22 RX ADMIN — POLYETHYLENE GLYCOL 3350 17 GRAM(S): 17 POWDER, FOR SOLUTION ORAL at 12:13

## 2019-07-22 RX ADMIN — SENNA PLUS 2 TABLET(S): 8.6 TABLET ORAL at 21:11

## 2019-07-22 RX ADMIN — MONTELUKAST 10 MILLIGRAM(S): 4 TABLET, CHEWABLE ORAL at 12:12

## 2019-07-22 RX ADMIN — Medication 15 UNIT(S): at 12:20

## 2019-07-22 RX ADMIN — ISOSORBIDE DINITRATE 30 MILLIGRAM(S): 5 TABLET ORAL at 21:12

## 2019-07-22 RX ADMIN — Medication 60 MILLIGRAM(S): at 05:59

## 2019-07-22 NOTE — CHART NOTE - NSCHARTNOTEFT_GEN_A_CORE
Nutrition Follow Up Note  Patient seen for: nutrition follow up on 6TOW    Chart reviewed, events noted. Pt is 72 yo woman with PMHx of HTN, T2DM (last HgbA1c 7.4%), CAD s/p CABG,  non-dilated ICM (EF 20-25%), severe mitral regurgitation s/p mitral clip, severe pulm HTN, CKD, hypothyroidism, presented to ED [facility] +acute on chronic HF, continues to undergo diuresis. Renal function fluctuating, nephrology following.    Source: electronic medical record, RN, pt (French speaking, too lethargic to participate with using  services at this time)    Diet : Consistent Carbohydrate (with evening snacks) + Low Fiber + Low Sodium +1500ml Fluid Restriction + Halal + Glucerna 1x/daily (provides additional 220kcal, 10g pro)     Patient reports: n/a, per chart no acute GI distress noted. Last BM .     PO intake : per chart, pt with variable po intake 15-85%. Per previous RD notes, pt with decreased appetite PTA reported by family and strategies for improving protein-energy intake were reviewed with family (not present at bedside at this time). Pt ordered for oral nutrition supplement, unclear if pt has been accepting this- none noted at bedside at this time.   Pt noted with POCT persisting >200-300, NP aware. Unclear if family bringing in food for pt? Pt/family may benefit from nutrition education for promoting adequate BG control as feasible.     Source for PO intake: chart     Enteral /Parenteral Nutrition: n/a    Daily Weight in k.6 (-22), noted with some wt fluctuations, continues with diuresis.  Weight in k.9 (-), Weight in k.7 (-20), Weight in k.9 (-19), Weight in k.8 (-18), Weight in k.3 (-17), Weight in k.3 (-16)  52.9 (-12)    Pertinent Medications: MEDICATIONS  (STANDING):  aspirin enteric coated 81 milliGRAM(s) Oral daily  atorvastatin 40 milliGRAM(s) Oral at bedtime  chlorhexidine 2% Cloths 1 Application(s) Topical daily  clopidogrel Tablet 75 milliGRAM(s) Oral daily  dextrose 5%. 1000 milliLiter(s) (50 mL/Hr) IV Continuous <Continuous>  dextrose 50% Injectable 12.5 Gram(s) IV Push once  dextrose 50% Injectable 25 Gram(s) IV Push once  dextrose 50% Injectable 25 Gram(s) IV Push once  docusate sodium 100 milliGRAM(s) Oral two times a day  furosemide   Injectable 60 milliGRAM(s) IV Push two times a day  hydrALAZINE 50 milliGRAM(s) Oral every 8 hours  insulin glargine Injectable (LANTUS) 32 Unit(s) SubCutaneous at bedtime  insulin lispro (HumaLOG) corrective regimen sliding scale   SubCutaneous three times a day before meals  insulin lispro (HumaLOG) corrective regimen sliding scale   SubCutaneous at bedtime  insulin lispro Injectable (HumaLOG) 15 Unit(s) SubCutaneous three times a day before meals  isosorbide   dinitrate Tablet (ISORDIL) 30 milliGRAM(s) Oral three times a day  levothyroxine 50 MICROGram(s) Oral daily  lidocaine   Patch 1 Patch Transdermal daily  melatonin 3 milliGRAM(s) Oral at bedtime  montelukast 10 milliGRAM(s) Oral daily  pantoprazole    Tablet 40 milliGRAM(s) Oral before breakfast  polyethylene glycol 3350 17 Gram(s) Oral daily  senna 2 Tablet(s) Oral at bedtime    MEDICATIONS  (PRN):  acetaminophen   Tablet .. 650 milliGRAM(s) Oral every 6 hours PRN Mild Pain (1 - 3), Moderate Pain (4 - 6)  dextrose 40% Gel 15 Gram(s) Oral once PRN Blood Glucose LESS THAN 70 milliGRAM(s)/deciliter  glucagon  Injectable 1 milliGRAM(s) IntraMuscular once PRN Glucose LESS THAN 70 milligrams/deciliter    Pertinent Labs:  @ 06:30: Na 136, BUN 51<H>, Cr 1.67<H>, <H>, K+ 3.7, Phos 2.7, Mg 2.0, Alk Phos --, ALT/SGPT --, AST/SGOT --, HbA1c --    Finger Sticks:  POCT Blood Glucose.: 347 mg/dL ( @ 07:41)  POCT Blood Glucose.: 360 mg/dL ( @ 21:20)  POCT Blood Glucose.: 351 mg/dL ( @ 16:28)  POCT Blood Glucose.: 392 mg/dL ( @ 12:10)      Skin per nursing documentation: no pressure injuries noted at this time  Edema: none noted    Estimated Needs:   [x] no change since previous assessment  [ ] recalculated:     Previous Nutrition Diagnosis: Predicted suboptimal energy intake  Nutrition Diagnosis is: ongoing, po intake noted as variable on current admission    New Nutrition Diagnosis: n/a    Recommend  1) Recommend liberalize diet to Consistent Carbohydrate (with evening snacks) + Low Sodium + Halal. Fluids deferred to team. Promote adequate BG control, recommend adjust insulin as needed.  2) Continue to provide Glucerna 1x/daily (provides additional 220kcal, 10g pro), monitor for pt acceptance  3) Recommend consider addition of renal multivitamin  4) Provide/review nutrition education regarding BG control as feasible.    Monitoring and Evaluation:     Continue to monitor Nutritional intake, Tolerance to diet prescription, weights, labs, skin integrity    RD remains available upon request and will follow up per protocol. Janneth Wnag RD, CDN Pager: [phone]

## 2019-07-22 NOTE — PROGRESS NOTE ADULT - ASSESSMENT
Pt is a 74 yo woman with PMHx of HTN, T2DM (A1c 7.4%), CAD s/p CABG (LIMA to LAD, SVG to OM, SVG to PDA 2014 at [facility]), non-dilated ICM (EF 20-25%), severe mitral regurgitation s/p mitral clip (6/13/19 Dr. Newman), severe pulm HTN, CKD, hypothyroidism, who is presenting to ED after [facility] Of note, pt was recently discharged on 6/19 after having mitral clip procedure completed. She initially required BiPAP with improvement in her respiratory status following diuresis. Initiated on vasopressors and inotropes in CCU, which were subsequently weaned off. Infectious work up was negative.    She remains mildly volume overloaded with NYHA Class IIIb symptoms. Her weight is up 1.7 kg, but I am unsure how accurate this is. Her renal function continues to improve and she is normotensive tolerating uptitration of vasodilators. Of note, her hemoglobin has dropped since admission, but currently does not have any obvious signs of bleeding.

## 2019-07-22 NOTE — PROGRESS NOTE ADULT - PROBLEM SELECTOR PLAN 6
- Ordered for 1 unit PRBC today as per primary team (ok to give in 1/2 units and give Lasix 40 mg IVP x 1 dose in between)  - Would w/u cause of anemia

## 2019-07-22 NOTE — PROGRESS NOTE ADULT - SUBJECTIVE AND OBJECTIVE BOX
[facility] [place] [facility]   MD RAHEEL SHAH MD RUORU WONG, PA    TEL:  OFFICE: [phone]  DR SANTOYO CELL: [phone]  JUSTEN KENDALL CELL: [phone]  DR. NGUYEN CELL: [phone]    RENAL FOLLOW UP NOTE  --------------------------------------------------------------------------------  HPI:      Pt seen and examined at bedside.   Intermittent SOB    PAST HISTORY  --------------------------------------------------------------------------------  No significant changes to PMH, PSH, FHx, SHx, unless otherwise noted    ALLERGIES & MEDICATIONS  --------------------------------------------------------------------------------  Allergies    azithromycin (Hives; Pruritus)    Intolerances      Standing Inpatient Medications  aspirin enteric coated 81 milliGRAM(s) Oral daily  atorvastatin 40 milliGRAM(s) Oral at bedtime  chlorhexidine 2% Cloths 1 Application(s) Topical daily  clopidogrel Tablet 75 milliGRAM(s) Oral daily  dextrose 5%. 1000 milliLiter(s) IV Continuous <Continuous>  dextrose 50% Injectable 12.5 Gram(s) IV Push once  dextrose 50% Injectable 25 Gram(s) IV Push once  dextrose 50% Injectable 25 Gram(s) IV Push once  docusate sodium 100 milliGRAM(s) Oral two times a day  furosemide   Injectable 60 milliGRAM(s) IV Push two times a day  hydrALAZINE 50 milliGRAM(s) Oral every 8 hours  insulin glargine Injectable (LANTUS) 32 Unit(s) SubCutaneous at bedtime  insulin lispro (HumaLOG) corrective regimen sliding scale   SubCutaneous three times a day before meals  insulin lispro (HumaLOG) corrective regimen sliding scale   SubCutaneous at bedtime  insulin lispro Injectable (HumaLOG) 15 Unit(s) SubCutaneous three times a day before meals  isosorbide   dinitrate Tablet (ISORDIL) 30 milliGRAM(s) Oral three times a day  levothyroxine 50 MICROGram(s) Oral daily  lidocaine   Patch 1 Patch Transdermal daily  melatonin 3 milliGRAM(s) Oral at bedtime  montelukast 10 milliGRAM(s) Oral daily  pantoprazole    Tablet 40 milliGRAM(s) Oral before breakfast  polyethylene glycol 3350 17 Gram(s) Oral daily  senna 2 Tablet(s) Oral at bedtime    PRN Inpatient Medications  acetaminophen   Tablet .. 650 milliGRAM(s) Oral every 6 hours PRN  dextrose 40% Gel 15 Gram(s) Oral once PRN  glucagon  Injectable 1 milliGRAM(s) IntraMuscular once PRN      REVIEW OF SYSTEMS  --------------------------------------------------------------------------------  General: no fever  CVS: no chest pain  RESP: intermittent  sob, no cough  ABD: no abdominal pain  : no dysuria,  MSK: no edema     VITALS/PHYSICAL EXAM  --------------------------------------------------------------------------------  T(C): 36.9 (07-22-19 @ 04:29), Max: 37 (07-21-19 @ 20:40)  HR: 73 (07-22-19 @ 04:29) (71 - 73)  BP: 110/58 (07-22-19 @ 04:29) (107/62 - 110/58)  RR: 18 (07-22-19 @ 04:29) (18 - 18)  SpO2: 100% (07-22-19 @ 04:29) (100% - 100%)  Wt(kg): --        07-21-19 @ 07:01  -  07-22-19 @ 07:00  --------------------------------------------------------  IN: 1080 mL / OUT: 1395 mL / NET: -315 mL      Physical Exam:  	Gen: NAD  	HEENT: MMM  	Pulm: decreased breath sounds  B/L  	CV: S1S2  	Abd: Soft, +BS  	Ext: No LE edema B/L                      Neuro: Awake   	Skin: Warm and Dry   	 no polo    LABS/STUDIES  --------------------------------------------------------------------------------              7.6    8.6   >-----------<  368      [07-22-19 @ 06:30]              22.8     136  |  98  |  51  ----------------------------<  295      [07-22-19 @ 06:30]  3.7   |  23  |  1.67        Ca     8.5     [07-22-19 @ 06:30]      Mg     2.0     [07-22-19 @ 06:30]      Phos  2.7     [07-22-19 @ 06:30]            Creatinine Trend:  SCr 1.67 [07-22 @ 06:30]  SCr <0.30 [07-21 @ 06:32]  SCr 1.77 [07-20 @ 06:45]  SCr 1.78 [07-19 @ 07:00]  SCr 1.92 [07-18 @ 05:13]    Urinalysis - [07-09-19 @ 13:28]      Color Yellow / Appearance Clear / SG 1.012 / pH 6.0      Gluc Negative / Ketone Negative  / Bili Negative / Urobili Negative       Blood Trace / Protein Trace / Leuk Est Negative / Nitrite Negative      RBC 1 / WBC 1 / Hyaline 0 / Gran  / Sq Epi  / Non Sq Epi 0 / Bacteria Moderate      Iron 42, TIBC 348, %sat 12      [07-05-19 @ 22:32]  Ferritin 130      [07-05-19 @ 22:32]  .4 (Ca --)      [04-25-19 @ 05:45]   --  PTH 43.55 (Ca --)      [09-10-18 @ 07:15]   --  PTH 36.60 (Ca --)      [09-08-18 @ 03:15]   --  Vitamin D (25OH) 25.9      [05-01-19 @ 04:00]  HbA1c 7.4      [07-05-19 @ 22:32]  TSH 2.00      [07-05-19 @ 22:32]  Lipid: chol 148, , HDL 35,       [06-01-19 @ 08:00]

## 2019-07-22 NOTE — PROGRESS NOTE ADULT - ATTENDING COMMENTS
A review of the paper chart has been conducted  HCP form present:               Yes and valid []               Yes but invalid [x]                No [ ]   MOLST present:                   Yes and valid []               Yes but invalid []                No [X]  Incapacity form present:   Yes and valid []                Yes but invalid []                No []                 N/A [X]  Living Will:                            Yes and valid []                Yes but invalid []                No [X]    No spouse  7 children  Elsa (lives with the patient) [place]  Marcyal son DELORES Weinstein [facility]  Anuj: Bryanna Malcolm [facility]    Will follow up in AM about proper HCP completion

## 2019-07-22 NOTE — PROGRESS NOTE ADULT - PROBLEM SELECTOR PLAN 4
- Currently SR  - Continue to monitor on tele - None over past 24 hours, continue to monitor on tele

## 2019-07-22 NOTE — PROGRESS NOTE ADULT - ASSESSMENT
Pt is a 74 yo woman with PMHx of HTN, T2DM, CAD s/p CABG (LIMA to LAD, SVG to OM, SVG to PDA 2014 at [facility]), non-dilated ICM (EF 20-25%), severe mitral regurgitation s/p mitral clip (6/13/19 Dr. Newman), severe pulm HTN, CKD, hypothyroidism, who is presenting to ED after experiencing worsening SOB      A/P:      MERVIN  Etiology?  Likely sec to cardiogenic shock  Renal function stable  PT non -oliguric  continue lasix per cardiology  Optimize glucose control  Monitor renal function   Avoid further nephrotoxics, NSAIDS RCA    CKD stage 4:  baseline Scr 1.8-2.0  Renal function fluctuates sec to CHF  Monitor renal function at present    SOB:  in setting of HF  Continue diuretics per cardiology    Acidosis   sec to RF  Improved  mOnitor serum Co2 at present

## 2019-07-22 NOTE — PROGRESS NOTE ADULT - SUBJECTIVE AND OBJECTIVE BOX
Pt is reporting dysgeusia    HCP not completely signed      PERTINENT PMH REVIEWED:  [ x] YES [ ] NO           SOCIAL HISTORY:  Significant other/partner:  [ ] YES  [x ] NO            Children:  [x] YES  [ ] NO                   Jewish/Spirituality: Rastafari  Substance hx:  [ ] YES   [x ] NO           Tobacco hx:  [ ] YES  [x ] NO             Alcohol hx: [ ] YES  [x ] NO        Home Opioid hx:  [ ] YES  [ x] NO   Living Situation: [ ] Home  [ ] Long term care  [x ] Rehab    REFERRALS:   [ ] Chaplaincy  [ ] Hospice  [ ] Child Life  [ ] Social Work  [ ] Case management [ ] Holistic Therapy     FAMILY HISTORY:  Family history of hypertension (Sibling): sister  Family history of diabetes mellitus (Sibling): brothers/sisters    BASELINE ADLs (prior to admission):  Independent [ ] moderately [ ] fully   Dependent   [ x] moderately [ ] fully    ADVANCE DIRECTIVES:  [ ] YES [x ] NO   DNR [ ] YES [x ] NO                      MOLST  [ ] YES [ x] NO    Living Will  [ ] YES [ ] NO    Health Care Proxy [ ] YES  [ ] NO      [ ] Surrogate  [x ] HCP  [ ] Guardian: Elsa Negrete- needs official documentation        Phone#: [phone]    Allergies    azithromycin (Hives; Pruritus)    Intolerances    MEDICATIONS  (STANDING):  aspirin enteric coated 81 milliGRAM(s) Oral daily  atorvastatin 40 milliGRAM(s) Oral at bedtime  chlorhexidine 2% Cloths 1 Application(s) Topical daily  clopidogrel Tablet 75 milliGRAM(s) Oral daily  dextrose 5%. 1000 milliLiter(s) (50 mL/Hr) IV Continuous <Continuous>  dextrose 50% Injectable 12.5 Gram(s) IV Push once  dextrose 50% Injectable 25 Gram(s) IV Push once  dextrose 50% Injectable 25 Gram(s) IV Push once  docusate sodium 100 milliGRAM(s) Oral two times a day  furosemide   Injectable 40 milliGRAM(s) IV Push two times a day  hydrALAZINE 50 milliGRAM(s) Oral every 8 hours  insulin glargine Injectable (LANTUS) 28 Unit(s) SubCutaneous at bedtime  insulin lispro (HumaLOG) corrective regimen sliding scale   SubCutaneous three times a day before meals  insulin lispro (HumaLOG) corrective regimen sliding scale   SubCutaneous at bedtime  insulin lispro Injectable (HumaLOG) 11 Unit(s) SubCutaneous three times a day before meals  isosorbide   dinitrate Tablet (ISORDIL) 10 milliGRAM(s) Oral three times a day  levothyroxine 50 MICROGram(s) Oral daily  lidocaine   Patch 1 Patch Transdermal daily  melatonin 3 milliGRAM(s) Oral at bedtime  montelukast 10 milliGRAM(s) Oral daily  pantoprazole    Tablet 40 milliGRAM(s) Oral before breakfast  polyethylene glycol 3350 17 Gram(s) Oral daily  senna 2 Tablet(s) Oral at bedtime    MEDICATIONS  (PRN):  acetaminophen   Tablet .. 650 milliGRAM(s) Oral every 6 hours PRN Mild Pain (1 - 3), Moderate Pain (4 - 6)  dextrose 40% Gel 15 Gram(s) Oral once PRN Blood Glucose LESS THAN 70 milliGRAM(s)/deciliter  glucagon  Injectable 1 milliGRAM(s) IntraMuscular once PRN Glucose LESS THAN 70 milligrams/deciliter      PRESENT SYMPTOMS:  Source: [ x] Patient   [x ] Family   [ ] Team     Pain: [x ] YES [ ] NO (neck)  OLDCARTS:     Dyspnea on exertion: [ x] YES [ ] NO   SOB: [ ] YES [x ] NO  Anxiety: [x ] YES [ ] NO  Fatigue: [x ] YES [ ] NO   Nausea: [ ] YES [x ] NO  Loss of appetite: [ ] YES [x ] NO   Constipation: [ ] YES [ x] NO     Other Symptoms:  [ ] All other review of systems negative   [ ] Unable to obtain due to poor mentation     Karnofsky Performance Score/Palliative Performance Status Version 2:         %  Protein Calorie Malutrition:  [x ] Mild   [ ] Moderate   [ ] Severe     Vital Signs Last 24 Hrs  T(C): 37.1 (17 Jul 2019 04:26), Max: 37.2 (16 Jul 2019 20:38)  T(F): 98.8 (17 Jul 2019 04:26), Max: 98.9 (16 Jul 2019 20:38)  HR: 67 (17 Jul 2019 04:26) (67 - 75)  BP: 109/61 (17 Jul 2019 04:26) (100/56 - 109/61)  BP(mean): --  RR: 18 (17 Jul 2019 04:26) (18 - 19)  SpO2: 100% (17 Jul 2019 04:26) (98% - 100%)    Physical Exam:    General: [ x] Alert,  A&O x 2    [ ] lethargic   [ ] Agitated   [ ] Cachexia   HEENT: [x ] Normal   [ ] Dry mouth   [ ] ET Tube    [ ] Trach   Lungs: [x ] Clear [ ] Rhonchi  [ ] Crackles [ ] Wheezing [ ] Tachypnea  [ ] Audible excessive secretions   Cardiovascular:  [x ] Regular rate and rhythm  [ ] Irregular [ ] Tachycardia   [ ] Bradycardia   Abdomen: [x ] Soft  [ ] Distended  [ ]  [x ] +BS  [x ] Non tender [ ] Tender  [ ]PEG   [ ] NGT   Last BM:     Genitourinary: [x ] Normal [ ] Incontinent   [ ] Oliguria/Anuria   [ ] Oscar  Musculoskeletal:  [ ] Normal   [x ] Generalized weakness  [ ] Bedbound   Neurological: [x ] No focal deficits  [ ] Cognitive impairment     Skin: [ x] Normal   [ ] Pressure ulcers     LABS:                        7.5    8.22  )-----------( 284      ( 17 Jul 2019 09:19 )             24.3     07-17    133<L>  |  98  |  70<H>  ----------------------------<  286<H>  4.7   |  21<L>  |  2.02<H>    Ca    9.3      17 Jul 2019 06:35  Phos  4.1     07-16  Mg     2.3     07-16    TPro  7.4  /  Alb  3.5  /  TBili  0.2  /  DBili  x   /  AST  30  /  ALT  131<H>  /  AlkPhos  133<H>  07-16        I&O's Summary    16 Jul 2019 07:01  -  17 Jul 2019 07:00  --------------------------------------------------------  IN: 120 mL / OUT: 1500 mL / NET: -1380 mL    17 Jul 2019 07:01  -  17 Jul 2019 11:50  --------------------------------------------------------  IN: 240 mL / OUT: 400 mL / NET: -160 mL

## 2019-07-22 NOTE — PROGRESS NOTE ADULT - PROBLEM SELECTOR PLAN 1
- Continue Lasix 60 mg IV BID  - Continue ISDN 30 mg PO TID and hydralazine 50 mg PO TID, hold for SBP < 95  - Digoxin held since 7/16 and repeat dig level on 7/19 was 1.5 - Continue Lasix 60 mg IV BID  - Continue ISDN 30 mg PO TID and hydralazine 50 mg PO TID, hold for SBP < 95  - Digoxin held since 7/16 for a dig level of 2.8, repeat level on 7/19 was 1.5

## 2019-07-22 NOTE — PROGRESS NOTE ADULT - SUBJECTIVE AND OBJECTIVE BOX
CHIEF COMPLAINT:    SUBJECTIVE:     REVIEW OF SYSTEMS:    CONSTITUTIONAL: (  )  weakness,  (  ) fevers or chills  EYES/ENT: (  )visual changes;     NECK: (  ) pain or stiffness  RESPIRATORY:   (  )cough, wheezing, hemoptysis;  (  ) shortness of breath  CARDIOVASCULAR:  (  )chest pain or palpitations  GASTROINTESTINAL:   (  )abdominal or epigastric pain.  (  ) nausea, vomiting, or hematemesis;   (   ) diarrhea or constipation.   GENITOURINARY:   (    ) dysuria, frequency or hematuria  NEUROLOGICAL:  (   ) numbness or weakness   All other review of systems is negative unless indicated above    Vital Signs Last 24 Hrs  T(C): 36.9 (22 Jul 2019 04:29), Max: 37 (21 Jul 2019 20:40)  T(F): 98.5 (22 Jul 2019 04:29), Max: 98.6 (21 Jul 2019 20:40)  HR: 73 (22 Jul 2019 04:29) (71 - 73)  BP: 110/58 (22 Jul 2019 04:29) (107/62 - 110/58)  BP(mean): --  RR: 18 (22 Jul 2019 04:29) (18 - 18)  SpO2: 100% (22 Jul 2019 04:29) (100% - 100%)    I&O's Summary    21 Jul 2019 07:01  -  22 Jul 2019 07:00  --------------------------------------------------------  IN: 1080 mL / OUT: 1395 mL / NET: -315 mL        CAPILLARY BLOOD GLUCOSE      POCT Blood Glucose.: 347 mg/dL (22 Jul 2019 07:41)  POCT Blood Glucose.: 360 mg/dL (21 Jul 2019 21:20)  POCT Blood Glucose.: 351 mg/dL (21 Jul 2019 16:28)  POCT Blood Glucose.: 392 mg/dL (21 Jul 2019 12:10)      PHYSICAL EXAM:    Constitutional:  (   ) NAD,   (   )awake and alert  HEENT: PERR, EOMI,    Neck: Soft and supple, No LAD, No JVD  Respiratory:  (    Breath sounds are clear bilaterally,    (   ) wheezing, rales or rhonchi  Cardiovascular:     (   )S1 and S2, regular rate and rhythm, no Murmurs, gallops or rubs  Gastrointestinal:  (   )Bowel Sounds present, soft,   (  )nontender, nondistended,    Extremities:    (  ) peripheral edema  Vascular: 2+ peripheral pulses  Neurological:    (    )A/O x 3,   (  ) focal deficits  Musculoskeletal:    (   )  normal strength b/l upper  (     ) normal  lower extremities  Skin: No rashes    MEDICATIONS:  MEDICATIONS  (STANDING):  aspirin enteric coated 81 milliGRAM(s) Oral daily  atorvastatin 40 milliGRAM(s) Oral at bedtime  chlorhexidine 2% Cloths 1 Application(s) Topical daily  clopidogrel Tablet 75 milliGRAM(s) Oral daily  dextrose 5%. 1000 milliLiter(s) (50 mL/Hr) IV Continuous <Continuous>  dextrose 50% Injectable 12.5 Gram(s) IV Push once  dextrose 50% Injectable 25 Gram(s) IV Push once  dextrose 50% Injectable 25 Gram(s) IV Push once  docusate sodium 100 milliGRAM(s) Oral two times a day  furosemide   Injectable 60 milliGRAM(s) IV Push two times a day  hydrALAZINE 50 milliGRAM(s) Oral every 8 hours  insulin glargine Injectable (LANTUS) 32 Unit(s) SubCutaneous at bedtime  insulin lispro (HumaLOG) corrective regimen sliding scale   SubCutaneous three times a day before meals  insulin lispro (HumaLOG) corrective regimen sliding scale   SubCutaneous at bedtime  insulin lispro Injectable (HumaLOG) 15 Unit(s) SubCutaneous three times a day before meals  isosorbide   dinitrate Tablet (ISORDIL) 30 milliGRAM(s) Oral three times a day  levothyroxine 50 MICROGram(s) Oral daily  lidocaine   Patch 1 Patch Transdermal daily  melatonin 3 milliGRAM(s) Oral at bedtime  montelukast 10 milliGRAM(s) Oral daily  pantoprazole    Tablet 40 milliGRAM(s) Oral before breakfast  polyethylene glycol 3350 17 Gram(s) Oral daily  senna 2 Tablet(s) Oral at bedtime      LABS: All Labs Reviewed:                        7.6    8.6   )-----------( 368      ( 22 Jul 2019 06:30 )             22.8     07-22    136  |  98  |  51<H>  ----------------------------<  295<H>  3.7   |  23  |  1.67<H>    Ca    8.5      22 Jul 2019 06:30  Phos  2.7     07-22  Mg     2.0     07-22            Blood Culture:   Urine Culture      RADIOLOGY/EKG:    ASSESSMENT AND PLAN:    DVT PPX:    ADVANCED DIRECTIVE:    DISPOSITION: CHIEF COMPLAINT:  Patient is sitting in the bed  still complainin of fatigue  SUBJECTIVE:     REVIEW OF SYSTEMS:    CONSTITUTIONAL: ( x )  weakness,  (  ) fevers or chills  EYES/ENT: (  )visual changes;     NECK: (  ) pain or stiffness  RESPIRATORY:   (  )cough, wheezing, hemoptysis;  (  ) shortness of breath  CARDIOVASCULAR:  (  )chest pain or palpitations  GASTROINTESTINAL:   (  )abdominal or epigastric pain.  (  ) nausea, vomiting, or hematemesis;   (   ) diarrhea or constipation.   GENITOURINARY:   (    ) dysuria, frequency or hematuria  NEUROLOGICAL:  (   ) numbness or weakness   All other review of systems is negative unless indicated above    Vital Signs Last 24 Hrs  T(C): 36.9 (22 Jul 2019 04:29), Max: 37 (21 Jul 2019 20:40)  T(F): 98.5 (22 Jul 2019 04:29), Max: 98.6 (21 Jul 2019 20:40)  HR: 73 (22 Jul 2019 04:29) (71 - 73)  BP: 110/58 (22 Jul 2019 04:29) (107/62 - 110/58)  BP(mean): --  RR: 18 (22 Jul 2019 04:29) (18 - 18)  SpO2: 100% (22 Jul 2019 04:29) (100% - 100%)    I&O's Summary    21 Jul 2019 07:01  -  22 Jul 2019 07:00  --------------------------------------------------------  IN: 1080 mL / OUT: 1395 mL / NET: -315 mL        CAPILLARY BLOOD GLUCOSE      POCT Blood Glucose.: 347 mg/dL (22 Jul 2019 07:41)  POCT Blood Glucose.: 360 mg/dL (21 Jul 2019 21:20)  POCT Blood Glucose.: 351 mg/dL (21 Jul 2019 16:28)  POCT Blood Glucose.: 392 mg/dL (21 Jul 2019 12:10)      PHYSICAL EXAM:    Constitutional:  ( x  ) NAD,   (   )awake and alert  HEENT: PERR, EOMI,    Neck: Soft and supple, No LAD, No JVD  Respiratory:  (    Breath sounds are clear bilaterally,    (   ) wheezing, rales or rhonchi  Cardiovascular:     (   )S1 and S2, regular rate and rhythm, no Murmurs, gallops or rubs  Gastrointestinal:  (   )Bowel Sounds present, soft,   (  )nontender, nondistended,    Extremities:    (  ) peripheral edema  Vascular: 2+ peripheral pulses  Neurological:    (    )A/O x 3,   (  ) focal deficits  Musculoskeletal:    (   )  normal strength b/l upper  (     ) normal  lower extremities  Skin: No rashes    MEDICATIONS:  MEDICATIONS  (STANDING):  aspirin enteric coated 81 milliGRAM(s) Oral daily  atorvastatin 40 milliGRAM(s) Oral at bedtime  chlorhexidine 2% Cloths 1 Application(s) Topical daily  clopidogrel Tablet 75 milliGRAM(s) Oral daily  dextrose 5%. 1000 milliLiter(s) (50 mL/Hr) IV Continuous <Continuous>  dextrose 50% Injectable 12.5 Gram(s) IV Push once  dextrose 50% Injectable 25 Gram(s) IV Push once  dextrose 50% Injectable 25 Gram(s) IV Push once  docusate sodium 100 milliGRAM(s) Oral two times a day  furosemide   Injectable 60 milliGRAM(s) IV Push two times a day  hydrALAZINE 50 milliGRAM(s) Oral every 8 hours  insulin glargine Injectable (LANTUS) 32 Unit(s) SubCutaneous at bedtime  insulin lispro (HumaLOG) corrective regimen sliding scale   SubCutaneous three times a day before meals  insulin lispro (HumaLOG) corrective regimen sliding scale   SubCutaneous at bedtime  insulin lispro Injectable (HumaLOG) 15 Unit(s) SubCutaneous three times a day before meals  isosorbide   dinitrate Tablet (ISORDIL) 30 milliGRAM(s) Oral three times a day  levothyroxine 50 MICROGram(s) Oral daily  lidocaine   Patch 1 Patch Transdermal daily  melatonin 3 milliGRAM(s) Oral at bedtime  montelukast 10 milliGRAM(s) Oral daily  pantoprazole    Tablet 40 milliGRAM(s) Oral before breakfast  polyethylene glycol 3350 17 Gram(s) Oral daily  senna 2 Tablet(s) Oral at bedtime      LABS: All Labs Reviewed:                        7.6    8.6   )-----------( 368      ( 22 Jul 2019 06:30 )             22.8     07-22    136  |  98  |  51<H>  ----------------------------<  295<H>  3.7   |  23  |  1.67<H>    Ca    8.5      22 Jul 2019 06:30  Phos  2.7     07-22  Mg     2.0     07-22            Blood Culture:   Urine Culture      RADIOLOGY/EKG:    ASSESSMENT AND PLAN:  Pt is a 72 yo woman with PMHx of HTN, T2DM (A1c 7.4%), CAD s/p CABG (LIMA to LAD, SVG to OM, SVG to PDA 2014 at [facility]), non-dilated ICM (EF 20-25%), severe mitral regurgitation s/p mitral clip (6/13/19 Dr. Newman), severe pulm HTN, CKD, hypothyroidism, who is presenting to ED after experiencing worsening SOB and [facility] Of note, pt was recently discharged on 6/19 after having mitral clip procedure completed. She initially required BiPAP with improvement in her respiratory status following diuresis. Initiated on vasopressors and inotropes in CCU, which were subsequently weaned off. Infectious work up was negative.    She remains volume overloaded with NYHA Class IIIb symptoms. She is not adequately responding to current diuretic regimen. Her weight is unchanged today despite increase in diuretics. Her Scrt is continuing to downtrend and now close to baseline. She is normotensive tolerating uptitration of vasodilators.     Problem/Plan - 1:  ·  Problem: Acute on chronic systolic heart failure.  Plan: - Please increase ISDN to 20 mg PO TID, hold for SBP < 90  -  on lasix to 60 mg IV BID Patient condition seems to be imving  - Continue hydralazine 50mg PO TID, hold for SBP < 90  - No ARB/ARNI/MRA at this time due to renal dysfunction  - Will hold on initiation of beta block until euvolemic  - Daily standing weights, strict I &Os.     Problem/Plan - 2:  ·  Problem: Severe mitral regurgitation.  Plan: - s/p Mitral clip   - TTE on 7/9: mild MR.     Problem/Plan - 3:  ·  Problem:  DM insulin glargine Injectable (LANTUS) 32 Unit(s) SubCutaneous at bedtime  insulin lispro (HumaLOG) corrective regimen sliding scale   SubCutaneous three times a day before meals  insulin lispro (HumaLOG) corrective regimen sliding scale   SubCutaneous at bedtime  insulin lispro Injectable (HumaLOG) 11 Unit(s) SubCutaneous three times a day before meals        Problem/Plan - 4:  ·  Problem: SVT (supraventricular tachycardia).  Plan: - Currently SR  - Continue to monitor on tele    -  CAD (coronary artery disease).  Plan: -Continue ASA, clopidogrel.  - Recheck digoxin level on Friday 7/19 as it was critically high on 7/16.     Problem/Plan - 5:  ·  Problem: Acute renal failure/Anemia discussed with the team to ask heart failure that if patient can be getting  2 half a unit blood transfusion.    - Diuresis as above  - Avoid nephrotoxins. c  DVT PPX:    ADVANCED DIRECTIVE:    DISPOSITION:

## 2019-07-22 NOTE — PROGRESS NOTE ADULT - SUBJECTIVE AND OBJECTIVE BOX
Subjective:  - Endorses ongoing malaise and SOB  - OOB to chair and denies CP, palpitations, lightheadedness, dizziness, or PND    Medications:  acetaminophen   Tablet .. 650 milliGRAM(s) Oral every 6 hours PRN  aspirin enteric coated 81 milliGRAM(s) Oral daily  atorvastatin 40 milliGRAM(s) Oral at bedtime  chlorhexidine 2% Cloths 1 Application(s) Topical daily  clopidogrel Tablet 75 milliGRAM(s) Oral daily  dextrose 40% Gel 15 Gram(s) Oral once PRN  dextrose 5%. 1000 milliLiter(s) IV Continuous <Continuous>  dextrose 50% Injectable 12.5 Gram(s) IV Push once  dextrose 50% Injectable 25 Gram(s) IV Push once  dextrose 50% Injectable 25 Gram(s) IV Push once  docusate sodium 100 milliGRAM(s) Oral two times a day  furosemide   Injectable 60 milliGRAM(s) IV Push two times a day  furosemide   Injectable 40 milliGRAM(s) IV Push once  glucagon  Injectable 1 milliGRAM(s) IntraMuscular once PRN  hydrALAZINE 50 milliGRAM(s) Oral every 8 hours  insulin glargine Injectable (LANTUS) 32 Unit(s) SubCutaneous at bedtime  insulin lispro (HumaLOG) corrective regimen sliding scale   SubCutaneous three times a day before meals  insulin lispro (HumaLOG) corrective regimen sliding scale   SubCutaneous at bedtime  insulin lispro Injectable (HumaLOG) 15 Unit(s) SubCutaneous three times a day before meals  isosorbide   dinitrate Tablet (ISORDIL) 30 milliGRAM(s) Oral three times a day  levothyroxine 50 MICROGram(s) Oral daily  lidocaine   Patch 1 Patch Transdermal daily  melatonin 3 milliGRAM(s) Oral at bedtime  montelukast 10 milliGRAM(s) Oral daily  pantoprazole    Tablet 40 milliGRAM(s) Oral before breakfast  polyethylene glycol 3350 17 Gram(s) Oral daily  senna 2 Tablet(s) Oral at bedtime      Physical Exam:    Vitals:  Vital Signs Last 24 Hours  T(C): 36.9 (19 @ 11:36), Max: 37 (19 @ 20:40)  HR: 77 (19 @ 13:56) (71 - 80)  BP: 111/64 (19 @ 13:56) (104/54 - 111/64)  RR: 18 (19 @ 13:56) (17 - 18)  SpO2: 98% (19 @ 13:56) (97% - 100%)    Weight in k.4 ( @ 14:00)    I&O's Summary    2019 07:  -  2019 07:00  --------------------------------------------------------  IN: 1080 mL / OUT: 1395 mL / NET: -315 mL    2019 07:  -  2019 17:34  --------------------------------------------------------  IN: 460 mL / OUT: 350 mL / NET: 110 mL        Tele: SR/1st degree HB, HR 60-80s    General: No distress. Fatigued appearing.  HEENT: EOM intact.  Neck: Neck supple. JVP moderately elevated with HJR. No masses  Chest: RLL crackles, otherwise CTA  CV: RRR. Normal S1 and S2. No murmurs, rub, or gallops. Radial pulses normal. No edema.  Abdomen: Soft, non-distended, non-tender  Skin: No rashes or skin breakdown. Warm peripherally.  Neurology: Alert and oriented times three. Sensation intact  Psych: Affect normal    Labs:                        7.6    8.6   )-----------( 368      ( 2019 06:30 )             22.8         136  |  98  |  51<H>  ----------------------------<  295<H>  3.7   |  23  |  1.67<H>    Ca    8.5      2019 06:30  Phos  2.7       Mg     2.0

## 2019-07-23 DIAGNOSIS — R63.0 ANOREXIA: ICD-10-CM

## 2019-07-23 LAB
ANION GAP SERPL CALC-SCNC: 13 MMOL/L — SIGNIFICANT CHANGE UP (ref 5–17)
BASE EXCESS BLDA CALC-SCNC: 2.2 MMOL/L — HIGH (ref -2–2)
BUN SERPL-MCNC: 49 MG/DL — HIGH (ref 7–23)
CALCIUM SERPL-MCNC: 9.3 MG/DL — SIGNIFICANT CHANGE UP (ref 8.4–10.5)
CHLORIDE SERPL-SCNC: 100 MMOL/L — SIGNIFICANT CHANGE UP (ref 96–108)
CK MB CFR SERPL CALC: 2.1 NG/ML — SIGNIFICANT CHANGE UP (ref 0–3.8)
CK SERPL-CCNC: 26 U/L — SIGNIFICANT CHANGE UP (ref 25–170)
CO2 BLDA-SCNC: 26 MMOL/L — SIGNIFICANT CHANGE UP (ref 22–30)
CO2 SERPL-SCNC: 24 MMOL/L — SIGNIFICANT CHANGE UP (ref 22–31)
CREAT SERPL-MCNC: 1.63 MG/DL — HIGH (ref 0.5–1.3)
GLUCOSE BLDC GLUCOMTR-MCNC: 323 MG/DL — HIGH (ref 70–99)
GLUCOSE BLDC GLUCOMTR-MCNC: 334 MG/DL — HIGH (ref 70–99)
GLUCOSE BLDC GLUCOMTR-MCNC: 399 MG/DL — HIGH (ref 70–99)
GLUCOSE BLDC GLUCOMTR-MCNC: 401 MG/DL — HIGH (ref 70–99)
GLUCOSE BLDC GLUCOMTR-MCNC: 412 MG/DL — HIGH (ref 70–99)
GLUCOSE SERPL-MCNC: 352 MG/DL — HIGH (ref 70–99)
HCO3 BLDA-SCNC: 25 MMOL/L — SIGNIFICANT CHANGE UP (ref 21–29)
HCT VFR BLD CALC: 29.1 % — LOW (ref 34.5–45)
HGB BLD-MCNC: 8.9 G/DL — LOW (ref 11.5–15.5)
HOROWITZ INDEX BLDA+IHG-RTO: 32 — SIGNIFICANT CHANGE UP
MCHC RBC-ENTMCNC: 27.3 PG — SIGNIFICANT CHANGE UP (ref 27–34)
MCHC RBC-ENTMCNC: 30.6 GM/DL — LOW (ref 32–36)
MCV RBC AUTO: 89.3 FL — SIGNIFICANT CHANGE UP (ref 80–100)
PCO2 BLDA: 32 MMHG — SIGNIFICANT CHANGE UP (ref 32–46)
PH BLDA: 7.5 — HIGH (ref 7.35–7.45)
PLATELET # BLD AUTO: 345 K/UL — SIGNIFICANT CHANGE UP (ref 150–400)
PO2 BLDA: 40 MMHG — CRITICAL LOW (ref 74–108)
POTASSIUM SERPL-MCNC: 3.9 MMOL/L — SIGNIFICANT CHANGE UP (ref 3.5–5.3)
POTASSIUM SERPL-SCNC: 3.9 MMOL/L — SIGNIFICANT CHANGE UP (ref 3.5–5.3)
RBC # BLD: 3.26 M/UL — LOW (ref 3.8–5.2)
RBC # FLD: 16.3 % — HIGH (ref 10.3–14.5)
SAO2 % BLDA: 76 % — LOW (ref 92–96)
SODIUM SERPL-SCNC: 137 MMOL/L — SIGNIFICANT CHANGE UP (ref 135–145)
TROPONIN T, HIGH SENSITIVITY RESULT: 69 NG/L — HIGH (ref 0–51)
WBC # BLD: 8.53 K/UL — SIGNIFICANT CHANGE UP (ref 3.8–10.5)
WBC # FLD AUTO: 8.53 K/UL — SIGNIFICANT CHANGE UP (ref 3.8–10.5)

## 2019-07-23 PROCEDURE — 99233 SBSQ HOSP IP/OBS HIGH 50: CPT

## 2019-07-23 PROCEDURE — 99497 ADVNCD CARE PLAN 30 MIN: CPT

## 2019-07-23 PROCEDURE — 93010 ELECTROCARDIOGRAM REPORT: CPT

## 2019-07-23 PROCEDURE — 71045 X-RAY EXAM CHEST 1 VIEW: CPT | Mod: 26

## 2019-07-23 RX ORDER — FAMOTIDINE 10 MG/ML
20 INJECTION INTRAVENOUS ONCE
Refills: 0 | Status: COMPLETED | OUTPATIENT
Start: 2019-07-23 | End: 2019-07-23

## 2019-07-23 RX ORDER — HYDROMORPHONE HYDROCHLORIDE 2 MG/ML
0.25 INJECTION INTRAMUSCULAR; INTRAVENOUS; SUBCUTANEOUS ONCE
Refills: 0 | Status: DISCONTINUED | OUTPATIENT
Start: 2019-07-23 | End: 2019-07-23

## 2019-07-23 RX ORDER — MORPHINE SULFATE 50 MG/1
0.25 CAPSULE, EXTENDED RELEASE ORAL ONCE
Refills: 0 | Status: DISCONTINUED | OUTPATIENT
Start: 2019-07-23 | End: 2019-07-23

## 2019-07-23 RX ORDER — INSULIN GLARGINE 100 [IU]/ML
40 INJECTION, SOLUTION SUBCUTANEOUS AT BEDTIME
Refills: 0 | Status: DISCONTINUED | OUTPATIENT
Start: 2019-07-23 | End: 2019-07-26

## 2019-07-23 RX ORDER — INSULIN LISPRO 100/ML
20 VIAL (ML) SUBCUTANEOUS
Refills: 0 | Status: DISCONTINUED | OUTPATIENT
Start: 2019-07-23 | End: 2019-07-23

## 2019-07-23 RX ORDER — FUROSEMIDE 40 MG
20 TABLET ORAL ONCE
Refills: 0 | Status: COMPLETED | OUTPATIENT
Start: 2019-07-23 | End: 2019-07-23

## 2019-07-23 RX ORDER — INSULIN LISPRO 100/ML
24 VIAL (ML) SUBCUTANEOUS
Refills: 0 | Status: DISCONTINUED | OUTPATIENT
Start: 2019-07-23 | End: 2019-07-26

## 2019-07-23 RX ORDER — ALPRAZOLAM 0.25 MG
0.25 TABLET ORAL ONCE
Refills: 0 | Status: DISCONTINUED | OUTPATIENT
Start: 2019-07-23 | End: 2019-07-23

## 2019-07-23 RX ORDER — INSULIN LISPRO 100/ML
6 VIAL (ML) SUBCUTANEOUS ONCE
Refills: 0 | Status: DISCONTINUED | OUTPATIENT
Start: 2019-07-23 | End: 2019-07-24

## 2019-07-23 RX ADMIN — Medication 4: at 08:11

## 2019-07-23 RX ADMIN — MORPHINE SULFATE 0.25 MILLIGRAM(S): 50 CAPSULE, EXTENDED RELEASE ORAL at 23:55

## 2019-07-23 RX ADMIN — MONTELUKAST 10 MILLIGRAM(S): 4 TABLET, CHEWABLE ORAL at 11:25

## 2019-07-23 RX ADMIN — Medication 100 MILLIGRAM(S): at 06:38

## 2019-07-23 RX ADMIN — Medication 15 UNIT(S): at 08:11

## 2019-07-23 RX ADMIN — CLOPIDOGREL BISULFATE 75 MILLIGRAM(S): 75 TABLET, FILM COATED ORAL at 11:25

## 2019-07-23 RX ADMIN — SENNA PLUS 2 TABLET(S): 8.6 TABLET ORAL at 21:46

## 2019-07-23 RX ADMIN — LIDOCAINE 1 PATCH: 4 CREAM TOPICAL at 08:13

## 2019-07-23 RX ADMIN — Medication 50 MICROGRAM(S): at 06:38

## 2019-07-23 RX ADMIN — ISOSORBIDE DINITRATE 30 MILLIGRAM(S): 5 TABLET ORAL at 06:38

## 2019-07-23 RX ADMIN — Medication 60 MILLIGRAM(S): at 17:34

## 2019-07-23 RX ADMIN — PANTOPRAZOLE SODIUM 40 MILLIGRAM(S): 20 TABLET, DELAYED RELEASE ORAL at 06:38

## 2019-07-23 RX ADMIN — ISOSORBIDE DINITRATE 30 MILLIGRAM(S): 5 TABLET ORAL at 13:48

## 2019-07-23 RX ADMIN — Medication 3 MILLIGRAM(S): at 21:52

## 2019-07-23 RX ADMIN — Medication 0.25 MILLIGRAM(S): at 23:43

## 2019-07-23 RX ADMIN — Medication 81 MILLIGRAM(S): at 11:25

## 2019-07-23 RX ADMIN — LIDOCAINE 1 PATCH: 4 CREAM TOPICAL at 21:45

## 2019-07-23 RX ADMIN — Medication 100 MILLIGRAM(S): at 17:33

## 2019-07-23 RX ADMIN — LIDOCAINE 1 PATCH: 4 CREAM TOPICAL at 09:09

## 2019-07-23 RX ADMIN — Medication 50 MILLIGRAM(S): at 21:44

## 2019-07-23 RX ADMIN — Medication 20 UNIT(S): at 16:58

## 2019-07-23 RX ADMIN — Medication 50 MILLIGRAM(S): at 06:38

## 2019-07-23 RX ADMIN — Medication 20 MILLIGRAM(S): at 22:52

## 2019-07-23 RX ADMIN — Medication 60 MILLIGRAM(S): at 06:38

## 2019-07-23 RX ADMIN — Medication 4: at 16:58

## 2019-07-23 RX ADMIN — Medication 15 UNIT(S): at 12:09

## 2019-07-23 RX ADMIN — HYDROMORPHONE HYDROCHLORIDE 0.25 MILLIGRAM(S): 2 INJECTION INTRAMUSCULAR; INTRAVENOUS; SUBCUTANEOUS at 23:22

## 2019-07-23 RX ADMIN — MORPHINE SULFATE 0.25 MILLIGRAM(S): 50 CAPSULE, EXTENDED RELEASE ORAL at 22:37

## 2019-07-23 RX ADMIN — MORPHINE SULFATE 0.25 MILLIGRAM(S): 50 CAPSULE, EXTENDED RELEASE ORAL at 23:04

## 2019-07-23 RX ADMIN — ISOSORBIDE DINITRATE 30 MILLIGRAM(S): 5 TABLET ORAL at 21:45

## 2019-07-23 RX ADMIN — FAMOTIDINE 20 MILLIGRAM(S): 10 INJECTION INTRAVENOUS at 23:22

## 2019-07-23 RX ADMIN — Medication 6: at 12:09

## 2019-07-23 RX ADMIN — INSULIN GLARGINE 40 UNIT(S): 100 INJECTION, SOLUTION SUBCUTANEOUS at 21:44

## 2019-07-23 RX ADMIN — ATORVASTATIN CALCIUM 40 MILLIGRAM(S): 80 TABLET, FILM COATED ORAL at 21:45

## 2019-07-23 RX ADMIN — POLYETHYLENE GLYCOL 3350 17 GRAM(S): 17 POWDER, FOR SOLUTION ORAL at 11:25

## 2019-07-23 RX ADMIN — Medication 50 MILLIGRAM(S): at 13:48

## 2019-07-23 NOTE — PROGRESS NOTE ADULT - SUBJECTIVE AND OBJECTIVE BOX
[facility]   MD RAHEEL SHAH MD RUORU WONG, PA    TEL:  OFFICE: [phone]  DR SANTOYO CELL: [phone]  JUSTEN KENDALL CELL: [phone]  DR. NGUYEN CELL: [phone]    RENAL FOLLOW UP NOTE  --------------------------------------------------------------------------------  HPI:      Pt seen and examined at bedside.   Keira SOB, chest pain     PAST HISTORY  --------------------------------------------------------------------------------  No significant changes to PMH, PSH, FHx, SHx, unless otherwise noted    ALLERGIES & MEDICATIONS  --------------------------------------------------------------------------------  Allergies    azithromycin (Hives; Pruritus)    Intolerances      Standing Inpatient Medications  aspirin enteric coated 81 milliGRAM(s) Oral daily  atorvastatin 40 milliGRAM(s) Oral at bedtime  chlorhexidine 2% Cloths 1 Application(s) Topical daily  clopidogrel Tablet 75 milliGRAM(s) Oral daily  dextrose 5%. 1000 milliLiter(s) IV Continuous <Continuous>  dextrose 50% Injectable 12.5 Gram(s) IV Push once  dextrose 50% Injectable 25 Gram(s) IV Push once  dextrose 50% Injectable 25 Gram(s) IV Push once  docusate sodium 100 milliGRAM(s) Oral two times a day  furosemide   Injectable 60 milliGRAM(s) IV Push two times a day  hydrALAZINE 50 milliGRAM(s) Oral every 8 hours  insulin glargine Injectable (LANTUS) 32 Unit(s) SubCutaneous at bedtime  insulin lispro (HumaLOG) corrective regimen sliding scale   SubCutaneous three times a day before meals  insulin lispro (HumaLOG) corrective regimen sliding scale   SubCutaneous at bedtime  insulin lispro Injectable (HumaLOG) 15 Unit(s) SubCutaneous three times a day before meals  isosorbide   dinitrate Tablet (ISORDIL) 30 milliGRAM(s) Oral three times a day  levothyroxine 50 MICROGram(s) Oral daily  lidocaine   Patch 1 Patch Transdermal daily  melatonin 3 milliGRAM(s) Oral at bedtime  montelukast 10 milliGRAM(s) Oral daily  pantoprazole    Tablet 40 milliGRAM(s) Oral before breakfast  polyethylene glycol 3350 17 Gram(s) Oral daily  senna 2 Tablet(s) Oral at bedtime    PRN Inpatient Medications  acetaminophen   Tablet .. 650 milliGRAM(s) Oral every 6 hours PRN  dextrose 40% Gel 15 Gram(s) Oral once PRN  glucagon  Injectable 1 milliGRAM(s) IntraMuscular once PRN      REVIEW OF SYSTEMS  --------------------------------------------------------------------------------  General: no fever  CVS: no chest pain  RESP: no sob, no cough  ABD: no abdominal pain  : no dysuria,  MSK: no edema     VITALS/PHYSICAL EXAM  --------------------------------------------------------------------------------  T(C): 36.8 (07-23-19 @ 04:09), Max: 37.1 (07-22-19 @ 20:13)  HR: 73 (07-23-19 @ 06:41) (72 - 80)  BP: 112/57 (07-23-19 @ 06:41) (103/61 - 117/62)  RR: 18 (07-23-19 @ 06:41) (17 - 18)  SpO2: 100% (07-23-19 @ 06:41) (97% - 100%)  Wt(kg): --        07-22-19 @ 07:01  -  07-23-19 @ 07:00  --------------------------------------------------------  IN: 635 mL / OUT: 900 mL / NET: -265 mL    07-23-19 @ 07:01  -  07-23-19 @ 10:22  --------------------------------------------------------  IN: 360 mL / OUT: 300 mL / NET: 60 mL      Physical Exam:  	Gen: NAD  	HEENT: MMM  	Pulm: CTA B/L  	CV: S1S2  	Abd: Soft, +BS  	Ext: No LE edema B/L                      Neuro: Awake   	Skin: Warm and Dry   	Rufino cuellar    LABS/STUDIES  --------------------------------------------------------------------------------              8.9    8.53  >-----------<  345      [07-23-19 @ 08:26]              29.1     137  |  100  |  49  ----------------------------<  352      [07-23-19 @ 06:43]  3.9   |  24  |  1.63        Ca     9.3     [07-23-19 @ 06:43]      Mg     2.0     [07-22-19 @ 06:30]      Phos  2.7     [07-22-19 @ 06:30]            Creatinine Trend:  SCr 1.63 [07-23 @ 06:43]  SCr 1.67 [07-22 @ 06:30]  SCr <0.30 [07-21 @ 06:32]  SCr 1.77 [07-20 @ 06:45]  SCr 1.78 [07-19 @ 07:00]    Urinalysis - [07-09-19 @ 13:28]      Color Yellow / Appearance Clear / SG 1.012 / pH 6.0      Gluc Negative / Ketone Negative  / Bili Negative / Urobili Negative       Blood Trace / Protein Trace / Leuk Est Negative / Nitrite Negative      RBC 1 / WBC 1 / Hyaline 0 / Gran  / Sq Epi  / Non Sq Epi 0 / Bacteria Moderate      Iron 42, TIBC 348, %sat 12      [07-05-19 @ 22:32]  Ferritin 130      [07-05-19 @ 22:32]  .4 (Ca --)      [04-25-19 @ 05:45]   --  PTH 43.55 (Ca --)      [09-10-18 @ 07:15]   --  PTH 36.60 (Ca --)      [09-08-18 @ 03:15]   --  Vitamin D (25OH) 25.9      [05-01-19 @ 04:00]  HbA1c 7.4      [07-05-19 @ 22:32]  TSH 2.00      [07-05-19 @ 22:32]  Lipid: chol 148, , HDL 35,       [06-01-19 @ 08:00]

## 2019-07-23 NOTE — PROGRESS NOTE ADULT - PROBLEM SELECTOR PLAN 5
As above, spoke with patient with video , [employer] #[id number] ([place]ilo.) Patient was asked about what she understands about her illness. She stated that she is "tired, and feels weak." When asked if she would like to discuss her illness and hospitalization she said and indicated that she would rather not have any details. Patient was asked about HCP and if there is anyone she would like to make decisions about her care on her behalf. She said she would like her daughter Elsa to act as HCP, because she is informed about patient's illness and takes care of patient. When asked about if she wanted her son involved as HCP, she declined, saying that he is not around a lot and that her daughter was her choice in this matter. Patient said her daughter usually comes to visit every day at about 2:45-3pm. Plan to fill out accurate HCP and replace document in chart to reflect patient's wishes. Mgmt per primary team No overt overload  Slight ZEE  Mgmt per primary team

## 2019-07-23 NOTE — PROGRESS NOTE ADULT - ASSESSMENT
Pt is a 74 yo woman with PMHx of HTN, T2DM, CAD s/p CABG (LIMA to LAD, SVG to OM, SVG to PDA 2014 at [facility]), non-dilated ICM (EF 20-25%), severe mitral regurgitation s/p mitral clip (6/13/19 Dr. Newman), severe pulm HTN, CKD, hypothyroidism, who is presented with worsening SOB, 2/2 acute on chronic systolic heart failure.

## 2019-07-23 NOTE — PROGRESS NOTE ADULT - PROBLEM SELECTOR PLAN 1
Likely disease related, 2/2 acute on chronic systolic heart failure  Continue to monitor No hypogeusia  No dysgeusia   Likely multifactorial  Will consider nonpharmacologic strategies for now

## 2019-07-23 NOTE — PROGRESS NOTE ADULT - PROBLEM SELECTOR PROBLEM 5
Palliative care encounter Acute on chronic systolic heart failure no nausea/no seizure/no syncope/no vomiting/no weakness/no dizziness/no loss of consciousness/no blurred vision/no confusion/no change in level of consciousness

## 2019-07-23 NOTE — PROGRESS NOTE ADULT - SUBJECTIVE AND OBJECTIVE BOX
Subjective:  - Reports ongoing weakness and fatigue  - Has only been getting OOB to chair  - Denies dyspnea with minimal exertion, orthopnea, PND, CP, palpitations, lightheadedness, dizziness, abdominal distention    Medications:  acetaminophen   Tablet .. 650 milliGRAM(s) Oral every 6 hours PRN  aspirin enteric coated 81 milliGRAM(s) Oral daily  atorvastatin 40 milliGRAM(s) Oral at bedtime  chlorhexidine 2% Cloths 1 Application(s) Topical daily  clopidogrel Tablet 75 milliGRAM(s) Oral daily  dextrose 40% Gel 15 Gram(s) Oral once PRN  dextrose 5%. 1000 milliLiter(s) IV Continuous <Continuous>  dextrose 50% Injectable 12.5 Gram(s) IV Push once  dextrose 50% Injectable 25 Gram(s) IV Push once  dextrose 50% Injectable 25 Gram(s) IV Push once  docusate sodium 100 milliGRAM(s) Oral two times a day  furosemide   Injectable 60 milliGRAM(s) IV Push two times a day  glucagon  Injectable 1 milliGRAM(s) IntraMuscular once PRN  hydrALAZINE 50 milliGRAM(s) Oral every 8 hours  insulin glargine Injectable (LANTUS) 40 Unit(s) SubCutaneous at bedtime  insulin lispro (HumaLOG) corrective regimen sliding scale   SubCutaneous three times a day before meals  insulin lispro (HumaLOG) corrective regimen sliding scale   SubCutaneous at bedtime  insulin lispro Injectable (HumaLOG) 20 Unit(s) SubCutaneous three times a day before meals  isosorbide   dinitrate Tablet (ISORDIL) 30 milliGRAM(s) Oral three times a day  levothyroxine 50 MICROGram(s) Oral daily  lidocaine   Patch 1 Patch Transdermal daily  melatonin 3 milliGRAM(s) Oral at bedtime  montelukast 10 milliGRAM(s) Oral daily  pantoprazole    Tablet 40 milliGRAM(s) Oral before breakfast  polyethylene glycol 3350 17 Gram(s) Oral daily  senna 2 Tablet(s) Oral at bedtime      Physical Exam:    Vitals:  Vital Signs Last 24 Hours  T(C): 36.9 (19 @ 12:07), Max: 37.1 (19 @ 20:13)  HR: 76 (19 @ 12:07) (72 - 77)  BP: 117/65 (19 @ 12:07) (103/61 - 117/65)  RR: 18 (19 @ 12:07) (18 - 18)  SpO2: 100% (19 @ 12:07) (98% - 100%)    Weight in k.4 ( @ 14:00)    I&O's Summary    2019 07:  -  2019 07:00  --------------------------------------------------------  IN: 635 mL / OUT: 900 mL / NET: -265 mL    2019 07:  -  2019 13:21  --------------------------------------------------------  IN: 360 mL / OUT: 300 mL / NET: 60 mL    Tele: SR HR 70-80s    General: No distress. Fatigued appearing.  HEENT: EOM intact.  Neck: Neck supple. JVP moderately elevated. No masses  Chest: RLL crackles, otherwise CTA  CV: RRR. Normal S1 and S2. No murmurs, rub, or gallops. Radial pulses normal. No edema.  Abdomen: Soft, non-distended, non-tender  Skin: No rashes or skin breakdown. Warm peripherally.  Neurology: Alert and oriented times three. Sensation intact  Psych: Affect normal    Labs:                        8.9    8.53  )-----------( 345      ( 2019 08:26 )             29.1         137  |  100  |  49<H>  ----------------------------<  352<H>  3.9   |  24  |  1.63<H>    Ca    9.3      2019 06:43  Phos  2.7       Mg     2.0

## 2019-07-23 NOTE — PROGRESS NOTE ADULT - SUBJECTIVE AND OBJECTIVE BOX
CHIEF COMPLAINT:    SUBJECTIVE:     REVIEW OF SYSTEMS:    CONSTITUTIONAL: (  )  weakness,  (  ) fevers or chills  EYES/ENT: (  )visual changes;     NECK: (  ) pain or stiffness  RESPIRATORY:   (  )cough, wheezing, hemoptysis;  (  ) shortness of breath  CARDIOVASCULAR:  (  )chest pain or palpitations  GASTROINTESTINAL:   (  )abdominal or epigastric pain.  (  ) nausea, vomiting, or hematemesis;   (   ) diarrhea or constipation.   GENITOURINARY:   (    ) dysuria, frequency or hematuria  NEUROLOGICAL:  (   ) numbness or weakness   All other review of systems is negative unless indicated above    Vital Signs Last 24 Hrs  T(C): 36.8 (23 Jul 2019 04:09), Max: 37.1 (22 Jul 2019 20:13)  T(F): 98.3 (23 Jul 2019 04:09), Max: 98.7 (22 Jul 2019 20:13)  HR: 73 (23 Jul 2019 06:41) (72 - 80)  BP: 112/57 (23 Jul 2019 06:41) (103/61 - 117/62)  BP(mean): --  RR: 18 (23 Jul 2019 06:41) (17 - 18)  SpO2: 100% (23 Jul 2019 06:41) (97% - 100%)    I&O's Summary    22 Jul 2019 07:01  -  23 Jul 2019 07:00  --------------------------------------------------------  IN: 635 mL / OUT: 900 mL / NET: -265 mL        CAPILLARY BLOOD GLUCOSE      POCT Blood Glucose.: 334 mg/dL (23 Jul 2019 07:55)  POCT Blood Glucose.: 335 mg/dL (22 Jul 2019 21:03)  POCT Blood Glucose.: 350 mg/dL (22 Jul 2019 16:40)  POCT Blood Glucose.: 285 mg/dL (22 Jul 2019 12:00)      PHYSICAL EXAM:    Constitutional:  (   ) NAD,   (   )awake and alert  HEENT: PERR, EOMI,    Neck: Soft and supple, No LAD, No JVD  Respiratory:  (    Breath sounds are clear bilaterally,    (   ) wheezing, rales or rhonchi  Cardiovascular:     (   )S1 and S2, regular rate and rhythm, no Murmurs, gallops or rubs  Gastrointestinal:  (   )Bowel Sounds present, soft,   (  )nontender, nondistended,    Extremities:    (  ) peripheral edema  Vascular: 2+ peripheral pulses  Neurological:    (    )A/O x 3,   (  ) focal deficits  Musculoskeletal:    (   )  normal strength b/l upper  (     ) normal  lower extremities  Skin: No rashes    MEDICATIONS:  MEDICATIONS  (STANDING):  aspirin enteric coated 81 milliGRAM(s) Oral daily  atorvastatin 40 milliGRAM(s) Oral at bedtime  chlorhexidine 2% Cloths 1 Application(s) Topical daily  clopidogrel Tablet 75 milliGRAM(s) Oral daily  dextrose 5%. 1000 milliLiter(s) (50 mL/Hr) IV Continuous <Continuous>  dextrose 50% Injectable 12.5 Gram(s) IV Push once  dextrose 50% Injectable 25 Gram(s) IV Push once  dextrose 50% Injectable 25 Gram(s) IV Push once  docusate sodium 100 milliGRAM(s) Oral two times a day  furosemide   Injectable 60 milliGRAM(s) IV Push two times a day  hydrALAZINE 50 milliGRAM(s) Oral every 8 hours  insulin glargine Injectable (LANTUS) 32 Unit(s) SubCutaneous at bedtime  insulin lispro (HumaLOG) corrective regimen sliding scale   SubCutaneous three times a day before meals  insulin lispro (HumaLOG) corrective regimen sliding scale   SubCutaneous at bedtime  insulin lispro Injectable (HumaLOG) 15 Unit(s) SubCutaneous three times a day before meals  isosorbide   dinitrate Tablet (ISORDIL) 30 milliGRAM(s) Oral three times a day  levothyroxine 50 MICROGram(s) Oral daily  lidocaine   Patch 1 Patch Transdermal daily  melatonin 3 milliGRAM(s) Oral at bedtime  montelukast 10 milliGRAM(s) Oral daily  pantoprazole    Tablet 40 milliGRAM(s) Oral before breakfast  polyethylene glycol 3350 17 Gram(s) Oral daily  senna 2 Tablet(s) Oral at bedtime      LABS: All Labs Reviewed:                        8.9    8.53  )-----------( 345      ( 23 Jul 2019 08:26 )             29.1     07-23    137  |  100  |  49<H>  ----------------------------<  352<H>  3.9   |  24  |  1.63<H>    Ca    9.3      23 Jul 2019 06:43  Phos  2.7     07-22  Mg     2.0     07-22            Blood Culture:   Urine Culture      RADIOLOGY/EKG:    ASSESSMENT AND PLAN:    DVT PPX:    ADVANCED DIRECTIVE:    DISPOSITION: CHIEF COMPLAINT: Patient received transfusion of 1 unit packed RBC without any side effect  and worsening of her CHF but unfortunately did not help her that much as I was expecting she still feels weak and fatigue    SUBJECTIVE:     REVIEW OF SYSTEMS:    CONSTITUTIONAL: ( x )  weakness,  (  ) fevers or chills  EYES/ENT: (  )visual changes;     NECK: (  ) pain or stiffness  RESPIRATORY:   (  )cough, wheezing, hemoptysis;  (  ) shortness of breath  CARDIOVASCULAR:  (  )chest pain or palpitations  GASTROINTESTINAL:   (  )abdominal or epigastric pain.  (  ) nausea, vomiting, or hematemesis;   (   ) diarrhea or constipation.   GENITOURINARY:   (    ) dysuria, frequency or hematuria  NEUROLOGICAL:  (   ) numbness or weakness   All other review of systems is negative unless indicated above    Vital Signs Last 24 Hrs  T(C): 36.8 (23 Jul 2019 04:09), Max: 37.1 (22 Jul 2019 20:13)  T(F): 98.3 (23 Jul 2019 04:09), Max: 98.7 (22 Jul 2019 20:13)  HR: 73 (23 Jul 2019 06:41) (72 - 80)  BP: 112/57 (23 Jul 2019 06:41) (103/61 - 117/62)  BP(mean): --  RR: 18 (23 Jul 2019 06:41) (17 - 18)  SpO2: 100% (23 Jul 2019 06:41) (97% - 100%)    I&O's Summary    22 Jul 2019 07:01  -  23 Jul 2019 07:00  --------------------------------------------------------  IN: 635 mL / OUT: 900 mL / NET: -265 mL        CAPILLARY BLOOD GLUCOSE      POCT Blood Glucose.: 334 mg/dL (23 Jul 2019 07:55)  POCT Blood Glucose.: 335 mg/dL (22 Jul 2019 21:03)  POCT Blood Glucose.: 350 mg/dL (22 Jul 2019 16:40)  POCT Blood Glucose.: 285 mg/dL (22 Jul 2019 12:00)      PHYSICAL EXAM:    Constitutional:  (  x ) NAD,   (   )awake and alert  HEENT: PERR, EOMI,    Neck: Soft and supple, No LAD, No JVD  Respiratory:  (    Breath sounds are clear bilaterally,    (   ) wheezing, rales or rhonchi  Cardiovascular:     (  x )S1 and S2, regular rate and rhythm, no Murmurs, gallops or rubs  Gastrointestinal:  ( x  )Bowel Sounds present, soft,   (  )nontender, nondistended,    Extremities:    (  ) peripheral edema  Vascular: 2+ peripheral pulses  Neurological:    (    )A/O x 3,   (  ) focal deficits  Musculoskeletal:    (   )  normal strength b/l upper  (     ) normal  lower extremities  Skin: No rashes    MEDICATIONS:  MEDICATIONS  (STANDING):  aspirin enteric coated 81 milliGRAM(s) Oral daily  atorvastatin 40 milliGRAM(s) Oral at bedtime  chlorhexidine 2% Cloths 1 Application(s) Topical daily  clopidogrel Tablet 75 milliGRAM(s) Oral daily  dextrose 5%. 1000 milliLiter(s) (50 mL/Hr) IV Continuous <Continuous>  dextrose 50% Injectable 12.5 Gram(s) IV Push once  dextrose 50% Injectable 25 Gram(s) IV Push once  dextrose 50% Injectable 25 Gram(s) IV Push once  docusate sodium 100 milliGRAM(s) Oral two times a day  furosemide   Injectable 60 milliGRAM(s) IV Push two times a day  hydrALAZINE 50 milliGRAM(s) Oral every 8 hours  insulin glargine Injectable (LANTUS) 32 Unit(s) SubCutaneous at bedtime  insulin lispro (HumaLOG) corrective regimen sliding scale   SubCutaneous three times a day before meals  insulin lispro (HumaLOG) corrective regimen sliding scale   SubCutaneous at bedtime  insulin lispro Injectable (HumaLOG) 15 Unit(s) SubCutaneous three times a day before meals  isosorbide   dinitrate Tablet (ISORDIL) 30 milliGRAM(s) Oral three times a day  levothyroxine 50 MICROGram(s) Oral daily  lidocaine   Patch 1 Patch Transdermal daily  melatonin 3 milliGRAM(s) Oral at bedtime  montelukast 10 milliGRAM(s) Oral daily  pantoprazole    Tablet 40 milliGRAM(s) Oral before breakfast  polyethylene glycol 3350 17 Gram(s) Oral daily  senna 2 Tablet(s) Oral at bedtime      LABS: All Labs Reviewed:                        8.9    8.53  )-----------( 345      ( 23 Jul 2019 08:26 )             29.1     07-23    137  |  100  |  49<H>  ----------------------------<  352<H>  3.9   |  24  |  1.63<H>    Ca    9.3      23 Jul 2019 06:43  Phos  2.7     07-22  Mg     2.0     07-22               7.6    8.6   )-----------( 368      ( 22 Jul 2019 06:30 )             22.8       Blood Culture:   Urine Culture      RADIOLOGY/EKG:    ASSESSMENT AND PLAN:  Pt is a 72 yo woman with PMHx of HTN, T2DM (A1c 7.4%), CAD s/p CABG (LIMA to LAD, SVG to OM, SVG to PDA 2014 at [facility]), non-dilated ICM (EF 20-25%), severe mitral regurgitation s/p mitral clip (6/13/19 Dr. Newman), severe pulm HTN, CKD, hypothyroidism, who is presenting to ED after experiencing worsening SOB and intermittent chest pain for [facility]. Of note, pt was recently discharged on 6/19 after having mitral clip procedure completed. She initially required BiPAP with improvement in her respiratory status following diuresis. Initiated on vasopressors and inotropes in CCU, which were subsequently weaned off. Infectious work up was negative.    She remains volume overloaded with NYHA Class IIIb symptoms. She is not adequately responding to current diuretic regimen. Her weight is unchanged today despite increase in diuretics. Her Scrt is continuing to downtrend and now close to baseline. She is normotensive tolerating uptitration of vasodilators.     Problem/Plan - 1:  ·  Problem: Acute on chronic systolic heart failure.  Plan: - Please increase ISDN to 20 mg PO TID, hold for SBP < 90  -  on lasix to 60 mg IV BID Patient condition  better will change to po  in  AM	  - Continue hydralazine 50mg PO TID, hold for SBP < 90  - No ARB/ARNI/MRA at this time due to renal dysfunction  - Will hold on initiation of beta block until euvolemic  - Daily standing weights, strict I &Os.     Problem/Plan - 2:  ·  Problem: Severe mitral regurgitation.  Plan: - s/p Mitral clip   - TTE on 7/9: mild MR.     Problem/Plan - 3:  ·  Problem:  DM insulin glargine Injectable (LANTUS) 32 Unit(s) SubCutaneous at bedtime  insulin lispro (HumaLOG) corrective regimen sliding scale   SubCutaneous three times a day before meals  insulin lispro (HumaLOG) corrective regimen sliding scale   SubCutaneous at bedtime  insulin lispro Injectable (HumaLOG) 11 Unit(s) SubCutaneous three times a day before meals        Problem/Plan - 4:  ·  Problem: SVT (supraventricular tachycardia).  Plan: - Currently SR  - Continue to monitor on tele    -  CAD (coronary artery disease).  Plan: -Continue ASA, clopidogrel.  - Recheck digoxin level on Friday 7/19 as it was critically high on 7/16.     Problem/Plan - 5:  ·  Problem: Acute renal failure/Anemia Receive  1 unit RBC  - Avoid nephrotoxins.   DVT PPX:    ADVANCED DIRECTIVE:    DISPOSITION:

## 2019-07-23 NOTE — PROGRESS NOTE ADULT - PROBLEM SELECTOR PLAN 6
As above, spoke with patient with video , [employer] #[phone] ([place].) Patient was asked about what she understands about her illness. She stated that she is "tired, and feels weak." When asked if she would like to discuss her illness and hospitalization she said and indicated that she would rather not have any details. Patient was asked about HCP and if there is anyone she would like to make decisions about her care on her behalf. She said she would like her daughter Elsa to act as HCP, because she is informed about patient's illness and takes care of patient. When asked about if she wanted her son involved as HCP, she declined, saying that he is not around a lot and that her daughter was her choice in this matter. Patient said her daughter usually comes to visit every day at about 2:45-3pm. Plan to fill out accurate HCP and replace document in chart to reflect patient's wishes.

## 2019-07-23 NOTE — PROGRESS NOTE ADULT - ASSESSMENT
Pt is a 74 yo woman with PMHx of HTN, T2DM, CAD s/p CABG (LIMA to LAD, SVG to OM, SVG to PDA 2014 at [facility]), non-dilated ICM (EF 20-25%), severe mitral regurgitation s/p mitral clip (6/13/19 Dr. Newman), severe pulm HTN, CKD, hypothyroidism, who is presenting to ED after experiencing worsening SOB      A/P:      MERVIN  Likely sec to cardiogenic shock  Renal function stable  PT non -oliguric  continue lasix per cardiology  Optimize glucose control  Monitor renal function   Avoid further nephrotoxics, NSAIDS RCA    CKD stage 4:  baseline Scr 1.8-2.0  Renal function fluctuates sec to CHF  Monitor renal function at present    SOB:  in setting of HF  Continue diuretics per cardiology    Acidosis   sec to RF  Improved  mOnitor serum Co2 at present

## 2019-07-23 NOTE — PROGRESS NOTE ADULT - PROBLEM SELECTOR PLAN 1
- Continue Lasix 60 mg IV BID  - Continue ISDN 30 mg PO TID and hydralazine 50 mg PO TID, hold for SBP < 95  - Digoxin held since 7/16 for a dig level of 2.8, repeat level on 7/19 was 1.5

## 2019-07-23 NOTE — PROGRESS NOTE ADULT - PROBLEM SELECTOR PLAN 6
- s/p unit PRBC with appropriate increased in Hgb. No overt s/s of bleeding  - Would w/u cause of anemia

## 2019-07-23 NOTE — PROGRESS NOTE ADULT - SUBJECTIVE AND OBJECTIVE BOX
PERTINENT PMH REVIEWED:  [ x] YES [ ] NO           SOCIAL HISTORY:  Significant other/partner:  [ ] YES  [x ] NO            Children:  [x] YES  [ ] NO                   Yazidi/Spirituality: Amish  Substance hx:  [ ] YES   [x ] NO           Tobacco hx:  [ ] YES  [x ] NO             Alcohol hx: [ ] YES  [x ] NO        Home Opioid hx:  [ ] YES  [ x] NO   Living Situation: [ ] Home  [ ] Long term care  [x ] Rehab    REFERRALS:   [ ] Chaplaincy  [ ] Hospice  [ ] Child Life  [ ] Social Work  [ ] Case management [ ] Holistic Therapy     FAMILY HISTORY:  Family history of hypertension (Sibling): sister  Family history of diabetes mellitus (Sibling): brothers/sisters    BASELINE ADLs (prior to admission):  Independent [ ] moderately [ ] fully   Dependent   [ x] moderately [ ] fully    ADVANCE DIRECTIVES:  [ ] YES [x ] NO   DNR [ ] YES [x ] NO                      MOLST  [ ] YES [ x] NO    Living Will  [ ] YES [ ] NO    Health Care Proxy [ ] YES  [ ] NO      [ ] Surrogate  [x ] HCP  [ ] Guardian: Elsa Negrete- patient confirmed she wants daughter Elsa to act as HCP        Phone#: [phone]    Allergies    azithromycin (Hives; Pruritus)    Intolerances    MEDICATIONS  (STANDING):  aspirin enteric coated 81 milliGRAM(s) Oral daily  atorvastatin 40 milliGRAM(s) Oral at bedtime  chlorhexidine 2% Cloths 1 Application(s) Topical daily  clopidogrel Tablet 75 milliGRAM(s) Oral daily  dextrose 5%. 1000 milliLiter(s) (50 mL/Hr) IV Continuous <Continuous>  dextrose 50% Injectable 12.5 Gram(s) IV Push once  dextrose 50% Injectable 25 Gram(s) IV Push once  dextrose 50% Injectable 25 Gram(s) IV Push once  docusate sodium 100 milliGRAM(s) Oral two times a day  furosemide   Injectable 40 milliGRAM(s) IV Push two times a day  hydrALAZINE 50 milliGRAM(s) Oral every 8 hours  insulin glargine Injectable (LANTUS) 28 Unit(s) SubCutaneous at bedtime  insulin lispro (HumaLOG) corrective regimen sliding scale   SubCutaneous three times a day before meals  insulin lispro (HumaLOG) corrective regimen sliding scale   SubCutaneous at bedtime  insulin lispro Injectable (HumaLOG) 11 Unit(s) SubCutaneous three times a day before meals  isosorbide   dinitrate Tablet (ISORDIL) 10 milliGRAM(s) Oral three times a day  levothyroxine 50 MICROGram(s) Oral daily  lidocaine   Patch 1 Patch Transdermal daily  melatonin 3 milliGRAM(s) Oral at bedtime  montelukast 10 milliGRAM(s) Oral daily  pantoprazole    Tablet 40 milliGRAM(s) Oral before breakfast  polyethylene glycol 3350 17 Gram(s) Oral daily  senna 2 Tablet(s) Oral at bedtime    MEDICATIONS  (PRN):  acetaminophen   Tablet .. 650 milliGRAM(s) Oral every 6 hours PRN Mild Pain (1 - 3), Moderate Pain (4 - 6)  dextrose 40% Gel 15 Gram(s) Oral once PRN Blood Glucose LESS THAN 70 milliGRAM(s)/deciliter  glucagon  Injectable 1 milliGRAM(s) IntraMuscular once PRN Glucose LESS THAN 70 milligrams/deciliter      PRESENT SYMPTOMS:  Source: [ x] Patient   [ ] Family   [ ] Team     Pain: [x ] YES [ ] NO (neck)  OLDCARTS:     Dyspnea on exertion: [ x] YES [ ] NO   SOB: [x ] YES [ ] NO  Anxiety: [x ] YES [ ] NO  Fatigue: [x ] YES [ ] NO   Nausea: [ ] YES [x ] NO  Loss of appetite: [ ] YES [x ] NO   Constipation: [ ] YES [ x] NO     Other Symptoms:  [ ] All other review of systems negative   [ ] Unable to obtain due to poor mentation     Karnofsky Performance Score/Palliative Performance Status Version 2:   40-50      %  Protein Calorie Malutrition:  [x ] Mild   [ ] Moderate   [ ] Severe     Vital Signs Last 24 Hrs  T(C): 37.1 (17 Jul 2019 04:26), Max: 37.2 (16 Jul 2019 20:38)  T(F): 98.8 (17 Jul 2019 04:26), Max: 98.9 (16 Jul 2019 20:38)  HR: 67 (17 Jul 2019 04:26) (67 - 75)  BP: 109/61 (17 Jul 2019 04:26) (100/56 - 109/61)  BP(mean): --  RR: 18 (17 Jul 2019 04:26) (18 - 19)  SpO2: 100% (17 Jul 2019 04:26) (98% - 100%)    Physical Exam:    General: [ x] Alert,  A&O x 2    [x ] lethargic   [ ] Agitated   [ ] Cachexia   HEENT: [x ] Normal   [ ] Dry mouth   [ ] ET Tube    [ ] Trach   Lungs: [x ] Clear [ ] Rhonchi  [ ] Crackles [ ] Wheezing [ ] Tachypnea  [ ] Audible excessive secretions   Cardiovascular:  [x ] Regular rate and rhythm  [ ] Irregular [ ] Tachycardia   [ ] Bradycardia   Abdomen: [x ] Soft  [ ] Distended  [ ]  [x ] +BS  [x ] Non tender [ ] Tender  [ ]PEG   [ ] NGT   Last BM:     Genitourinary: [x ] Normal [ ] Incontinent   [ ] Oliguria/Anuria   [ ] Oscar  Musculoskeletal:  [ ] Normal   [x ] Generalized weakness  [ ] Bedbound   Neurological: [x ] No focal deficits  [ ] Cognitive impairment     Skin: [ x] Normal   [ ] Pressure ulcers     LABS:                        7.5    8.22  )-----------( 284      ( 17 Jul 2019 09:19 )             24.3     07-17    133<L>  |  98  |  70<H>  ----------------------------<  286<H>  4.7   |  21<L>  |  2.02<H>    Ca    9.3      17 Jul 2019 06:35  Phos  4.1     07-16  Mg     2.3     07-16    TPro  7.4  /  Alb  3.5  /  TBili  0.2  /  DBili  x   /  AST  30  /  ALT  131<H>  /  AlkPhos  133<H>  07-16        I&O's Summary    16 Jul 2019 07:01  -  17 Jul 2019 07:00  --------------------------------------------------------  IN: 120 mL / OUT: 1500 mL / NET: -1380 mL    17 Jul 2019 07:01  -  17 Jul 2019 11:50  --------------------------------------------------------  IN: 240 mL / OUT: 400 mL / NET: -160 mL    GOC Discussion:  Spoke with patient with video , [employer] #[id number] ([place].) Patient was asked about what she understands about her illness. She stated that she is "tired, and feels weak." When asked if she would like to discuss her illness and hospitalization she said and indicated that she would rather not have any details. Patient was asked about HCP and if there is anyone she would like to make decisions about her care on her behalf. She said she would like her daughter Elsa to act as HCP, because she is informed about patient's illness and takes care of patient. When asked about if she wanted her son involved as HCP, she declined, saying that he is not around a lot and that her daughter was her choice in this matter. Patient said her daughter usually comes to visit every day at about 2:45-3pm. Plan to fill out accurate HCP and replace document in chart to reflect patient's wishes.

## 2019-07-23 NOTE — PROGRESS NOTE ADULT - ASSESSMENT
Pt is a 72 yo woman with PMHx of HTN, T2DM (A1c 7.4%), CAD s/p CABG (LIMA to LAD, SVG to OM, SVG to PDA 2014 at [facility]), non-dilated ICM (EF 20-25%), severe mitral regurgitation s/p mitral clip (6/13/19 Dr. Newman), severe pulm HTN, CKD, hypothyroidism, who is presenting to ED after [facility] worsening SO[facility] and [facility] Of note, pt was recently discharged on 6/19 after having mitral clip procedure completed. She initially required BiPAP with improvement in her respiratory status following diuresis. Initiated on vasopressors and inotropes in CCU, which were subsequently weaned off. Infectious work up was negative.    She remains mildly volume overloaded on exam. While she reports she is urinating frequently only 900ml UOP recorded over the past 24 hours. Weight not yet done tonight. Her SCr had been downtrending, now stable. She is normotensive on moderate doses of vasodilators.

## 2019-07-23 NOTE — GOALS OF CARE CONVERSATION - PERSONAL ADVANCE DIRECTIVE - CONVERSATION DETAILS
A review of the paper chart has been conducted    HCP form present:               Yes and valid []               Yes but invalid [x]                No []     MOLST present:                   Yes and valid []               Yes but invalid []                No [x]    Incapacity form present:   Yes and valid []                Yes but invalid []                No []                 N/A []    Living Will:                            Yes and valid []                Yes but invalid []                No []      Meeting content:  ACP Time spent 40 minutes  As above, spoke with patient with video , [employer] #[id number] ([facility].) Patient was asked about what she understands about her illness. She stated that she is "tired, and feels weak." When asked if she would like to discuss her illness and hospitalization she said and indicated that she would rather not have any details. Patient was asked about HCP and if there is anyone she would like to make decisions about her care on her behalf. She said she would like her daughter Elsa to act as HCP, because she is informed about patient's illness and takes care of patient. When asked about if she wanted her son involved as HCP, she declined, saying that he is not around a lot and that her daughter was her choice in this matter. Patient said her daughter usually comes to visit every day at about 2:45-3pm. Plan to fill out accurate HCP and replace document in chart to reflect patient's wishes. A review of the paper chart has been conducted    HCP form present:               Yes and valid []               Yes but invalid [x]                No []     MOLST present:                   Yes and valid []               Yes but invalid []                No [x]    Incapacity form present:   Yes and valid []                Yes but invalid []                No []                 N/A []    Living Will:                            Yes and valid []                Yes but invalid []                No []      Meeting content:  ACP Time spent 40 minutes  As above, spoke with patient with video , [employer] #[id number] ([facility].) Patient was asked about what she understands about her illness. She stated that she is "tired, and feels weak." When asked if she would like to discuss her illness and hospitalization she said and indicated that she would rather not have any details. Patient was asked about HCP and if there is anyone she would like to make decisions about her care on her behalf. She said she would like her daughter Elsa to act as HCP, because she is informed about patient's illness and takes care of patient. When asked about if she wanted her son involved as HCP, she declined, saying that he is not around a lot and that her daughter was her choice in this matter. Patient said her daughter usually comes to visit every day at about 2:45-3pm. Plan to fill out accurate HCP and replace document in chart to reflect patient's wishes.    Palliative SW to follow up and generate a valid HCP

## 2019-07-23 NOTE — PROGRESS NOTE ADULT - PROBLEM SELECTOR PLAN 2
Judicious dosing of agents Likely disease related, 2/2 acute on chronic systolic heart failure  Continue to monitor

## 2019-07-24 LAB
ANION GAP SERPL CALC-SCNC: 15 MMOL/L — SIGNIFICANT CHANGE UP (ref 5–17)
ANION GAP SERPL CALC-SCNC: 18 MMOL/L — HIGH (ref 5–17)
BASE EXCESS BLDA CALC-SCNC: -0.1 MMOL/L — SIGNIFICANT CHANGE UP (ref -2–2)
BASE EXCESS BLDA CALC-SCNC: 1 MMOL/L — SIGNIFICANT CHANGE UP (ref -2–2)
BUN SERPL-MCNC: 46 MG/DL — HIGH (ref 7–23)
BUN SERPL-MCNC: 49 MG/DL — HIGH (ref 7–23)
CALCIUM SERPL-MCNC: 9.5 MG/DL — SIGNIFICANT CHANGE UP (ref 8.4–10.5)
CALCIUM SERPL-MCNC: 9.7 MG/DL — SIGNIFICANT CHANGE UP (ref 8.4–10.5)
CHLORIDE SERPL-SCNC: 95 MMOL/L — LOW (ref 96–108)
CHLORIDE SERPL-SCNC: 97 MMOL/L — SIGNIFICANT CHANGE UP (ref 96–108)
CK MB CFR SERPL CALC: 2.1 NG/ML — SIGNIFICANT CHANGE UP (ref 0–3.8)
CK SERPL-CCNC: 27 U/L — SIGNIFICANT CHANGE UP (ref 25–170)
CO2 BLDA-SCNC: 24 MMOL/L — SIGNIFICANT CHANGE UP (ref 22–30)
CO2 BLDA-SCNC: 26 MMOL/L — SIGNIFICANT CHANGE UP (ref 22–30)
CO2 SERPL-SCNC: 23 MMOL/L — SIGNIFICANT CHANGE UP (ref 22–31)
CO2 SERPL-SCNC: 25 MMOL/L — SIGNIFICANT CHANGE UP (ref 22–31)
CREAT SERPL-MCNC: 1.48 MG/DL — HIGH (ref 0.5–1.3)
CREAT SERPL-MCNC: 1.53 MG/DL — HIGH (ref 0.5–1.3)
GAS PNL BLDA: SIGNIFICANT CHANGE UP
GAS PNL BLDA: SIGNIFICANT CHANGE UP
GLUCOSE BLDC GLUCOMTR-MCNC: 249 MG/DL — HIGH (ref 70–99)
GLUCOSE BLDC GLUCOMTR-MCNC: 251 MG/DL — HIGH (ref 70–99)
GLUCOSE BLDC GLUCOMTR-MCNC: 282 MG/DL — HIGH (ref 70–99)
GLUCOSE BLDC GLUCOMTR-MCNC: 320 MG/DL — HIGH (ref 70–99)
GLUCOSE BLDC GLUCOMTR-MCNC: 324 MG/DL — HIGH (ref 70–99)
GLUCOSE BLDC GLUCOMTR-MCNC: 369 MG/DL — HIGH (ref 70–99)
GLUCOSE SERPL-MCNC: 298 MG/DL — HIGH (ref 70–99)
GLUCOSE SERPL-MCNC: 356 MG/DL — HIGH (ref 70–99)
HCO3 BLDA-SCNC: 23 MMOL/L — SIGNIFICANT CHANGE UP (ref 21–29)
HCO3 BLDA-SCNC: 25 MMOL/L — SIGNIFICANT CHANGE UP (ref 21–29)
HCT VFR BLD CALC: 28.8 % — LOW (ref 34.5–45)
HCT VFR BLD CALC: 30.6 % — LOW (ref 34.5–45)
HGB BLD-MCNC: 10.1 G/DL — LOW (ref 11.5–15.5)
HGB BLD-MCNC: 9.4 G/DL — LOW (ref 11.5–15.5)
HOROWITZ INDEX BLDA+IHG-RTO: 32 — SIGNIFICANT CHANGE UP
HOROWITZ INDEX BLDA+IHG-RTO: 32 — SIGNIFICANT CHANGE UP
LACTATE SERPL-SCNC: 1.8 MMOL/L — SIGNIFICANT CHANGE UP (ref 0.7–2)
LACTATE SERPL-SCNC: 2.7 MMOL/L — HIGH (ref 0.7–2)
MCHC RBC-ENTMCNC: 28.4 PG — SIGNIFICANT CHANGE UP (ref 27–34)
MCHC RBC-ENTMCNC: 28.4 PG — SIGNIFICANT CHANGE UP (ref 27–34)
MCHC RBC-ENTMCNC: 32.8 GM/DL — SIGNIFICANT CHANGE UP (ref 32–36)
MCHC RBC-ENTMCNC: 33 GM/DL — SIGNIFICANT CHANGE UP (ref 32–36)
MCV RBC AUTO: 85.9 FL — SIGNIFICANT CHANGE UP (ref 80–100)
MCV RBC AUTO: 86.5 FL — SIGNIFICANT CHANGE UP (ref 80–100)
OB PNL STL: NEGATIVE — SIGNIFICANT CHANGE UP
PCO2 BLDA: 31 MMHG — LOW (ref 32–46)
PCO2 BLDA: 38 MMHG — SIGNIFICANT CHANGE UP (ref 32–46)
PH BLDA: 7.43 — SIGNIFICANT CHANGE UP (ref 7.35–7.45)
PH BLDA: 7.47 — HIGH (ref 7.35–7.45)
PLATELET # BLD AUTO: 383 K/UL — SIGNIFICANT CHANGE UP (ref 150–400)
PLATELET # BLD AUTO: 409 K/UL — HIGH (ref 150–400)
PO2 BLDA: 100 MMHG — SIGNIFICANT CHANGE UP (ref 74–108)
PO2 BLDA: 175 MMHG — HIGH (ref 74–108)
POTASSIUM SERPL-MCNC: 3.6 MMOL/L — SIGNIFICANT CHANGE UP (ref 3.5–5.3)
POTASSIUM SERPL-MCNC: 3.9 MMOL/L — SIGNIFICANT CHANGE UP (ref 3.5–5.3)
POTASSIUM SERPL-SCNC: 3.6 MMOL/L — SIGNIFICANT CHANGE UP (ref 3.5–5.3)
POTASSIUM SERPL-SCNC: 3.9 MMOL/L — SIGNIFICANT CHANGE UP (ref 3.5–5.3)
RBC # BLD: 3.32 M/UL — LOW (ref 3.8–5.2)
RBC # BLD: 3.57 M/UL — LOW (ref 3.8–5.2)
RBC # FLD: 16.7 % — HIGH (ref 10.3–14.5)
RBC # FLD: 16.8 % — HIGH (ref 10.3–14.5)
SAO2 % BLDA: 98 % — HIGH (ref 92–96)
SAO2 % BLDA: 99 % — HIGH (ref 92–96)
SODIUM SERPL-SCNC: 136 MMOL/L — SIGNIFICANT CHANGE UP (ref 135–145)
SODIUM SERPL-SCNC: 137 MMOL/L — SIGNIFICANT CHANGE UP (ref 135–145)
TROPONIN T, HIGH SENSITIVITY RESULT: 66 NG/L — HIGH (ref 0–51)
TROPONIN T, HIGH SENSITIVITY RESULT: 68 NG/L — HIGH (ref 0–51)
WBC # BLD: 9 K/UL — SIGNIFICANT CHANGE UP (ref 3.8–10.5)
WBC # BLD: 9.7 K/UL — SIGNIFICANT CHANGE UP (ref 3.8–10.5)
WBC # FLD AUTO: 9 K/UL — SIGNIFICANT CHANGE UP (ref 3.8–10.5)
WBC # FLD AUTO: 9.7 K/UL — SIGNIFICANT CHANGE UP (ref 3.8–10.5)

## 2019-07-24 PROCEDURE — 93010 ELECTROCARDIOGRAM REPORT: CPT

## 2019-07-24 PROCEDURE — 99233 SBSQ HOSP IP/OBS HIGH 50: CPT

## 2019-07-24 RX ORDER — ACETAMINOPHEN 500 MG
1000 TABLET ORAL ONCE
Refills: 0 | Status: DISCONTINUED | OUTPATIENT
Start: 2019-07-24 | End: 2019-09-05

## 2019-07-24 RX ADMIN — POLYETHYLENE GLYCOL 3350 17 GRAM(S): 17 POWDER, FOR SOLUTION ORAL at 12:33

## 2019-07-24 RX ADMIN — Medication 100 MILLIGRAM(S): at 17:27

## 2019-07-24 RX ADMIN — Medication 1: at 22:04

## 2019-07-24 RX ADMIN — CHLORHEXIDINE GLUCONATE 1 APPLICATION(S): 213 SOLUTION TOPICAL at 09:03

## 2019-07-24 RX ADMIN — Medication 24 UNIT(S): at 12:33

## 2019-07-24 RX ADMIN — Medication 50 MILLIGRAM(S): at 06:20

## 2019-07-24 RX ADMIN — Medication 50 MILLIGRAM(S): at 14:51

## 2019-07-24 RX ADMIN — LIDOCAINE 1 PATCH: 4 CREAM TOPICAL at 06:17

## 2019-07-24 RX ADMIN — ATORVASTATIN CALCIUM 40 MILLIGRAM(S): 80 TABLET, FILM COATED ORAL at 22:03

## 2019-07-24 RX ADMIN — MONTELUKAST 10 MILLIGRAM(S): 4 TABLET, CHEWABLE ORAL at 12:33

## 2019-07-24 RX ADMIN — ISOSORBIDE DINITRATE 30 MILLIGRAM(S): 5 TABLET ORAL at 14:51

## 2019-07-24 RX ADMIN — Medication 60 MILLIGRAM(S): at 06:18

## 2019-07-24 RX ADMIN — ISOSORBIDE DINITRATE 30 MILLIGRAM(S): 5 TABLET ORAL at 22:04

## 2019-07-24 RX ADMIN — Medication 81 MILLIGRAM(S): at 12:33

## 2019-07-24 RX ADMIN — SENNA PLUS 2 TABLET(S): 8.6 TABLET ORAL at 22:04

## 2019-07-24 RX ADMIN — Medication 1: at 02:54

## 2019-07-24 RX ADMIN — LIDOCAINE 1 PATCH: 4 CREAM TOPICAL at 09:45

## 2019-07-24 RX ADMIN — Medication 2: at 17:27

## 2019-07-24 RX ADMIN — Medication 4: at 09:02

## 2019-07-24 RX ADMIN — HYDROMORPHONE HYDROCHLORIDE 0.25 MILLIGRAM(S): 2 INJECTION INTRAMUSCULAR; INTRAVENOUS; SUBCUTANEOUS at 00:35

## 2019-07-24 RX ADMIN — Medication 3 MILLIGRAM(S): at 22:03

## 2019-07-24 RX ADMIN — Medication 60 MILLIGRAM(S): at 17:27

## 2019-07-24 RX ADMIN — LIDOCAINE 1 PATCH: 4 CREAM TOPICAL at 22:04

## 2019-07-24 RX ADMIN — CLOPIDOGREL BISULFATE 75 MILLIGRAM(S): 75 TABLET, FILM COATED ORAL at 12:33

## 2019-07-24 RX ADMIN — Medication 50 MILLIGRAM(S): at 22:05

## 2019-07-24 RX ADMIN — PANTOPRAZOLE SODIUM 40 MILLIGRAM(S): 20 TABLET, DELAYED RELEASE ORAL at 06:18

## 2019-07-24 RX ADMIN — Medication 5: at 12:33

## 2019-07-24 RX ADMIN — Medication 100 MILLIGRAM(S): at 06:18

## 2019-07-24 RX ADMIN — INSULIN GLARGINE 40 UNIT(S): 100 INJECTION, SOLUTION SUBCUTANEOUS at 22:04

## 2019-07-24 RX ADMIN — Medication 24 UNIT(S): at 09:01

## 2019-07-24 RX ADMIN — ISOSORBIDE DINITRATE 30 MILLIGRAM(S): 5 TABLET ORAL at 06:18

## 2019-07-24 RX ADMIN — Medication 24 UNIT(S): at 17:27

## 2019-07-24 RX ADMIN — MORPHINE SULFATE 0.25 MILLIGRAM(S): 50 CAPSULE, EXTENDED RELEASE ORAL at 00:35

## 2019-07-24 RX ADMIN — Medication 50 MICROGRAM(S): at 06:18

## 2019-07-24 NOTE — GOALS OF CARE CONVERSATION - PERSONAL ADVANCE DIRECTIVE - CONVERSATION DETAILS
[place]: Address medical decision making    Topic discussed previously    Yes []      No [x]    Complexity        Low [x]              Medium []      High []       Unknown []    Potential barriers to expeditious decision making: TBD    Objectives defined in premeeting huddle: None    Provider: Palliative team present    Content: We met with Estrella and tried to understand the daughter and patient's understanding of the medical trajectory.  They both recognize a decline.  The daughter state's that the patient wants to go home, however, she feels that degree of caregiving may not be feasible.  The daughter is uncertain about making these decisions in isolation and wants to speak with her siblings.  She also wants the palliative medicine team to speak with her  to help keep the family as informed as possible. We discussed curative, restorative, versus completely symptom focused care, about the which the daughter wanted to speak with her family.    Tone:  The daughter seemed very concerned about her mother's health, but also concerned about the challenges of caregiver.    Summary: Daughter apprised of current state of health.  She is looking to have agreement within the family and will seek that out.      Action Items:  [] To contact son in law Mohsin [phone]  [] Schedule follow up meeting/call    Follow up: Will speak with the primary team    ACP Time spent >13 min

## 2019-07-24 NOTE — PROGRESS NOTE ADULT - SUBJECTIVE AND OBJECTIVE BOX
CHIEF COMPLAINT:    SUBJECTIVE:     REVIEW OF SYSTEMS:    CONSTITUTIONAL: (  )  weakness,  (  ) fevers or chills  EYES/ENT: (  )visual changes;     NECK: (  ) pain or stiffness  RESPIRATORY:   (  )cough, wheezing, hemoptysis;  (  ) shortness of breath  CARDIOVASCULAR:  (  )chest pain or palpitations  GASTROINTESTINAL:   (  )abdominal or epigastric pain.  (  ) nausea, vomiting, or hematemesis;   (   ) diarrhea or constipation.   GENITOURINARY:   (    ) dysuria, frequency or hematuria  NEUROLOGICAL:  (   ) numbness or weakness   All other review of systems is negative unless indicated above    Vital Signs Last 24 Hrs  T(C): 36.8 (24 Jul 2019 04:27), Max: 36.9 (23 Jul 2019 12:07)  T(F): 98.3 (24 Jul 2019 04:27), Max: 98.4 (23 Jul 2019 12:07)  HR: 68 (24 Jul 2019 06:00) (68 - 76)  BP: 114/65 (24 Jul 2019 06:00) (111/60 - 117/72)  BP(mean): --  RR: 18 (24 Jul 2019 04:27) (18 - 20)  SpO2: 100% (24 Jul 2019 04:27) (100% - 100%)    I&O's Summary    23 Jul 2019 07:01  -  24 Jul 2019 07:00  --------------------------------------------------------  IN: 600 mL / OUT: 1400 mL / NET: -800 mL        CAPILLARY BLOOD GLUCOSE      POCT Blood Glucose.: 282 mg/dL (24 Jul 2019 02:49)  POCT Blood Glucose.: 320 mg/dL (24 Jul 2019 00:17)  POCT Blood Glucose.: 412 mg/dL (23 Jul 2019 21:13)  POCT Blood Glucose.: 399 mg/dL (23 Jul 2019 21:11)  POCT Blood Glucose.: 323 mg/dL (23 Jul 2019 16:45)  POCT Blood Glucose.: 401 mg/dL (23 Jul 2019 11:39)      PHYSICAL EXAM:    Constitutional:  (   ) NAD,   (   )awake and alert  HEENT: PERR, EOMI,    Neck: Soft and supple, No LAD, No JVD  Respiratory:  (    Breath sounds are clear bilaterally,    (   ) wheezing, rales or rhonchi  Cardiovascular:     (   )S1 and S2, regular rate and rhythm, no Murmurs, gallops or rubs  Gastrointestinal:  (   )Bowel Sounds present, soft,   (  )nontender, nondistended,    Extremities:    (  ) peripheral edema  Vascular: 2+ peripheral pulses  Neurological:    (    )A/O x 3,   (  ) focal deficits  Musculoskeletal:    (   )  normal strength b/l upper  (     ) normal  lower extremities  Skin: No rashes    MEDICATIONS:  MEDICATIONS  (STANDING):  acetaminophen  IVPB .. 1000 milliGRAM(s) IV Intermittent once  aspirin enteric coated 81 milliGRAM(s) Oral daily  atorvastatin 40 milliGRAM(s) Oral at bedtime  chlorhexidine 2% Cloths 1 Application(s) Topical daily  clopidogrel Tablet 75 milliGRAM(s) Oral daily  dextrose 5%. 1000 milliLiter(s) (50 mL/Hr) IV Continuous <Continuous>  dextrose 50% Injectable 12.5 Gram(s) IV Push once  dextrose 50% Injectable 25 Gram(s) IV Push once  dextrose 50% Injectable 25 Gram(s) IV Push once  docusate sodium 100 milliGRAM(s) Oral two times a day  furosemide   Injectable 60 milliGRAM(s) IV Push two times a day  hydrALAZINE 50 milliGRAM(s) Oral every 8 hours  insulin glargine Injectable (LANTUS) 40 Unit(s) SubCutaneous at bedtime  insulin lispro (HumaLOG) corrective regimen sliding scale   SubCutaneous three times a day before meals  insulin lispro (HumaLOG) corrective regimen sliding scale   SubCutaneous at bedtime  insulin lispro Injectable (HumaLOG) 24 Unit(s) SubCutaneous three times a day before meals  isosorbide   dinitrate Tablet (ISORDIL) 30 milliGRAM(s) Oral three times a day  levothyroxine 50 MICROGram(s) Oral daily  lidocaine   Patch 1 Patch Transdermal daily  melatonin 3 milliGRAM(s) Oral at bedtime  montelukast 10 milliGRAM(s) Oral daily  pantoprazole    Tablet 40 milliGRAM(s) Oral before breakfast  polyethylene glycol 3350 17 Gram(s) Oral daily  senna 2 Tablet(s) Oral at bedtime      LABS: All Labs Reviewed:                        9.4    9.0   )-----------( 383      ( 24 Jul 2019 06:31 )             28.8     07-24    137  |  97  |  46<H>  ----------------------------<  298<H>  3.6   |  25  |  1.48<H>    Ca    9.5      24 Jul 2019 06:39        CARDIAC MARKERS ( 24 Jul 2019 03:03 )  x     / x     / 27 U/L / x     / 2.1 ng/mL  CARDIAC MARKERS ( 23 Jul 2019 22:43 )  x     / x     / 26 U/L / x     / 2.1 ng/mL      Blood Culture:   Urine Culture      RADIOLOGY/EKG:    ASSESSMENT AND PLAN:    DVT PPX:    ADVANCED DIRECTIVE:    DISPOSITION: CHIEF COMPLAINT: Patient received transfusion of 1 unit packed RBC without any side effect  and worsening of her CHF but unfortunately did not help her that much as I was expecting she still feels weak and fatigue    SUBJECTIVE:     REVIEW OF SYSTEMS:    CONSTITUTIONAL: (x  )  weakness,  (  ) fevers or chills  EYES/ENT: (  )visual changes;     NECK: (  ) pain or stiffness  RESPIRATORY:   (  )cough, wheezing, hemoptysis;  (  ) shortness of breath  CARDIOVASCULAR:  (  )chest pain or palpitations  GASTROINTESTINAL:   (  )abdominal or epigastric pain.  (  ) nausea, vomiting, or hematemesis;   (   ) diarrhea or constipation.   GENITOURINARY:   (    ) dysuria, frequency or hematuria  NEUROLOGICAL:  (   ) numbness or weakness   All other review of systems is negative unless indicated above    Vital Signs Last 24 Hrs  T(C): 36.8 (24 Jul 2019 04:27), Max: 36.9 (23 Jul 2019 12:07)  T(F): 98.3 (24 Jul 2019 04:27), Max: 98.4 (23 Jul 2019 12:07)  HR: 68 (24 Jul 2019 06:00) (68 - 76)  BP: 114/65 (24 Jul 2019 06:00) (111/60 - 117/72)  BP(mean): --  RR: 18 (24 Jul 2019 04:27) (18 - 20)  SpO2: 100% (24 Jul 2019 04:27) (100% - 100%)    I&O's Summary    23 Jul 2019 07:01  -  24 Jul 2019 07:00  --------------------------------------------------------  IN: 600 mL / OUT: 1400 mL / NET: -800 mL        CAPILLARY BLOOD GLUCOSE      POCT Blood Glucose.: 282 mg/dL (24 Jul 2019 02:49)  POCT Blood Glucose.: 320 mg/dL (24 Jul 2019 00:17)  POCT Blood Glucose.: 412 mg/dL (23 Jul 2019 21:13)  POCT Blood Glucose.: 399 mg/dL (23 Jul 2019 21:11)  POCT Blood Glucose.: 323 mg/dL (23 Jul 2019 16:45)  POCT Blood Glucose.: 401 mg/dL (23 Jul 2019 11:39)      PHYSICAL EXAM:   Constitutional:  (  x ) NAD,   (   )awake and alert  HEENT: PERR, EOMI,    Neck: Soft and supple, No LAD, No JVD  Respiratory:  (    Breath sounds are clear bilaterally,    (   ) wheezing, rales or rhonchi  Cardiovascular:     (  x )S1 and S2, regular rate and rhythm, no Murmurs, gallops or rubs  Gastrointestinal:  ( x  )Bowel Sounds present, soft,   (  )nontender, nondistended,    Extremities:    (  ) peripheral edema  Vascular: 2+ peripheral pulses  Neurological:    (    )A/O x 3,   (  ) focal deficits  Musculoskeletal:    (   )  normal strength b/l upper  (     ) normal  lower extremities  Skin: No rashes    MEDICATIONS:  MEDICATIONS  (STANDING):  acetaminophen  IVPB .. 1000 milliGRAM(s) IV Intermittent once  aspirin enteric coated 81 milliGRAM(s) Oral daily  atorvastatin 40 milliGRAM(s) Oral at bedtime  chlorhexidine 2% Cloths 1 Application(s) Topical daily  clopidogrel Tablet 75 milliGRAM(s) Oral daily  dextrose 5%. 1000 milliLiter(s) (50 mL/Hr) IV Continuous <Continuous>  dextrose 50% Injectable 12.5 Gram(s) IV Push once  dextrose 50% Injectable 25 Gram(s) IV Push once  dextrose 50% Injectable 25 Gram(s) IV Push once  docusate sodium 100 milliGRAM(s) Oral two times a day  furosemide   Injectable 60 milliGRAM(s) IV Push two times a day  hydrALAZINE 50 milliGRAM(s) Oral every 8 hours  insulin glargine Injectable (LANTUS) 40 Unit(s) SubCutaneous at bedtime  insulin lispro (HumaLOG) corrective regimen sliding scale   SubCutaneous three times a day before meals  insulin lispro (HumaLOG) corrective regimen sliding scale   SubCutaneous at bedtime  insulin lispro Injectable (HumaLOG) 24 Unit(s) SubCutaneous three times a day before meals  isosorbide   dinitrate Tablet (ISORDIL) 30 milliGRAM(s) Oral three times a day  levothyroxine 50 MICROGram(s) Oral daily  lidocaine   Patch 1 Patch Transdermal daily  melatonin 3 milliGRAM(s) Oral at bedtime  montelukast 10 milliGRAM(s) Oral daily  pantoprazole    Tablet 40 milliGRAM(s) Oral before breakfast  polyethylene glycol 3350 17 Gram(s) Oral daily  senna 2 Tablet(s) Oral at bedtime      LABS: All Labs Reviewed:                        9.4    9.0   )-----------( 383      ( 24 Jul 2019 06:31 )             28.8     07-24    137  |  97  |  46<H>  ----------------------------<  298<H>  3.6   |  25  |  1.48<H>    Ca    9.5      24 Jul 2019 06:39        CARDIAC MARKERS ( 24 Jul 2019 03:03 )  x     / x     / 27 U/L / x     / 2.1 ng/mL  CARDIAC MARKERS ( 23 Jul 2019 22:43 )  x     / x     / 26 U/L / x     / 2.1 ng/mL      Blood Culture:   Urine Culture      RADIOLOGY/EKG:    ASSESSMENT AND PLAN:  Pt is a 72 yo woman with PMHx of HTN, T2DM (A1c 7.4%), CAD s/p CABG (LIMA to LAD, SVG to OM, SVG to PDA 2014 at [facility]), non-dilated ICM (EF 20-25%), severe mitral regurgitation s/p mitral clip (6/13/19 Dr. Newman), severe pulm HTN, CKD, hypothyroidism, who is presenting to ED after [facility] Of note, pt was recently discharged on 6/19 after having mitral clip procedure completed. She initially required BiPAP with improvement in her respiratory status following diuresis. Initiated on vasopressors and inotropes in CCU, which were subsequently weaned off. Infectious work up was negative.    She remains volume overloaded with NYHA Class IIIb symptoms. She is not adequately responding to current diuretic regimen. Her weight is unchanged today despite increase in diuretics. Her Scrt is continuing to downtrend and now close to baseline. She is normotensive tolerating uptitration of vasodilators.     Problem/Plan - 1:  ·  Problem: Acute on chronic systolic heart failure.  Plan: - Please increase ISDN to 20 mg PO TID, hold for SBP < 90  -  on lasix to 60 mg IV BID Patient condition  better will change to po  in  AM	  - Continue hydralazine 50mg PO TID, hold for SBP < 90  - No ARB/ARNI/MRA at this time due to renal dysfunction  - Will hold on initiation of beta block until euvolemic  - Daily standing weights, strict I &Os.     Problem/Plan - 2:  ·  Problem: Severe mitral regurgitation.  Plan: - s/p Mitral clip   - TTE on 7/9: mild MR.     Problem/Plan - 3:  ·  Problem:  DM insulin glargine Injectable (LANTUS) 32 Unit(s) SubCutaneous at bedtime  insulin lispro (HumaLOG) corrective regimen sliding scale   SubCutaneous three times a day before meals  insulin lispro (HumaLOG) corrective regimen sliding scale   SubCutaneous at bedtime  insulin lispro Injectable (HumaLOG) 11 Unit(s) SubCutaneous three times a day before meals        Problem/Plan - 4:  ·  Problem: SVT (supraventricular tachycardia).  Plan: - Currently SR  - Continue to monitor on tele    -  CAD (coronary artery disease).  Plan: -Continue ASA, clopidogrel.  - Recheck digoxin level on Friday 7/19 as it was critically high on 7/16.     Problem/Plan - 5:  ·  Problem: Acute renal failure/Anemia Receive  1 unit RBC  - Avoid nephrotoxins.   DVT PPX:    ADVANCED DIRECTIVE:    DISPOSITION: possible discharge home versus rehab will discuss with family also there will be a meeting the family today

## 2019-07-24 NOTE — PROGRESS NOTE ADULT - PROBLEM SELECTOR PLAN 4
- Continue to monitor on tele  - Digoxin held since 7/16 for a dig level of 2.8, repeat level on 7/19 was 1.5

## 2019-07-24 NOTE — PROGRESS NOTE ADULT - ASSESSMENT
Pt is a 72 yo woman with PMHx of HTN, T2DM (A1c 7.4%), CAD s/p CABG (LIMA to LAD, SVG to OM, SVG to PDA 2014 at [facility]), non-dilated ICM (EF 20-25%), severe mitral regurgitation s/p mitral clip (6/13/19 Dr. Newman), severe pulm HTN, CKD, hypothyroidism, who is presenting to ED after experiencing worsening SOB and intermittent chest pain for 1 week at [facility]. Of note, pt was recently discharged on 6/19 after having mitral clip procedure completed. She initially required BiPAP with improvement in her respiratory status following diuresis. Initiated on vasopressors and inotropes in CCU, which were subsequently weaned off. Infectious work up was negative.    She continues to have evidence of elevated filling pressures on exam. Her weight remains unchanged from yesterday on current dose of IV diuretics. Her SCr is downtrending. She is normotensive on moderate doses of vasodilators.

## 2019-07-24 NOTE — PROGRESS NOTE ADULT - SUBJECTIVE AND OBJECTIVE BOX
Subjective:  -Episode of CP overnight without EKG changes at that time. Treated with morphine, Dilaudid xanax, lasix 20mg IV and famotidine.   - Went into SVT this AM HR 140s which then converted to junctional briefly and back to SR  - This morning reports feeling unwell. Continues to note weakness and fatigue.    Medications:  acetaminophen   Tablet .. 650 milliGRAM(s) Oral every 6 hours PRN  acetaminophen  IVPB .. 1000 milliGRAM(s) IV Intermittent once  aspirin enteric coated 81 milliGRAM(s) Oral daily  atorvastatin 40 milliGRAM(s) Oral at bedtime  chlorhexidine 2% Cloths 1 Application(s) Topical daily  clopidogrel Tablet 75 milliGRAM(s) Oral daily  dextrose 40% Gel 15 Gram(s) Oral once PRN  dextrose 5%. 1000 milliLiter(s) IV Continuous <Continuous>  dextrose 50% Injectable 12.5 Gram(s) IV Push once  dextrose 50% Injectable 25 Gram(s) IV Push once  dextrose 50% Injectable 25 Gram(s) IV Push once  docusate sodium 100 milliGRAM(s) Oral two times a day  furosemide   Injectable 60 milliGRAM(s) IV Push two times a day  glucagon  Injectable 1 milliGRAM(s) IntraMuscular once PRN  hydrALAZINE 50 milliGRAM(s) Oral every 8 hours  insulin glargine Injectable (LANTUS) 40 Unit(s) SubCutaneous at bedtime  insulin lispro (HumaLOG) corrective regimen sliding scale   SubCutaneous three times a day before meals  insulin lispro (HumaLOG) corrective regimen sliding scale   SubCutaneous at bedtime  insulin lispro Injectable (HumaLOG) 24 Unit(s) SubCutaneous three times a day before meals  isosorbide   dinitrate Tablet (ISORDIL) 30 milliGRAM(s) Oral three times a day  levothyroxine 50 MICROGram(s) Oral daily  lidocaine   Patch 1 Patch Transdermal daily  melatonin 3 milliGRAM(s) Oral at bedtime  montelukast 10 milliGRAM(s) Oral daily  pantoprazole    Tablet 40 milliGRAM(s) Oral before breakfast  polyethylene glycol 3350 17 Gram(s) Oral daily  senna 2 Tablet(s) Oral at bedtime      Physical Exam:    Vitals:  Vital Signs Last 24 Hours  T(C): 36.5 (19 @ 12:26), Max: 36.8 (19 @ 04:27)  HR: 72 (19 @ 14:49) (68 - 73)  BP: 110/59 (19 @ 14:49) (109/62 - 117/72)  RR: 18 (19 @ 14:49) (18 - 20)  SpO2: 100% (19 @ 14:49) (97% - 100%)    Weight in k.4 ( @ 11:00)    I&O's Summary    2019 07:01  -  2019 07:00  --------------------------------------------------------  IN: 600 mL / OUT: 1400 mL / NET: -800 mL    Tele: SR, HR 70s, SVT overnight with HR 140s, junctional, then converted back to SR    General: No distress. Fatigued appearing, frail.   HEENT: EOM intact.  Neck: Neck supple. JVP moderately elevated. No masses  Chest: RLL crackles, otherwise CTA  CV: RRR. Normal S1 and S2. No murmurs, rub, or gallops. Radial pulses normal. No edema.  Abdomen: Soft, non-distended, non-tender  Skin: No rashes or skin breakdown. Warm peripherally.  Neurology: Lethargic, arousable to voice. Sensation intact  Psych: Affect normal    Labs:                        9.4    9.0   )-----------( 383      ( 2019 06:31 )             28.8         137  |  97  |  46<H>  ----------------------------<  298<H>  3.6   |  25  |  1.48<H>    Ca    9.5      2019 06:39

## 2019-07-24 NOTE — CHART NOTE - NSCHARTNOTEFT_GEN_A_CORE
Notified by RN pt c/o of cp  Pt examined at a bed side, states " I don't fell good", pt admits to cp which started "just non" non-radiating, midsternal, pressure like, rated at 8/10. pain reproducible with light palpation.   Pt admits to sob, however saturating at 100% on 3 L NS, talking full sentences, NAD.  Pt admits to neck pain  Pt denies abd pain, n/v/c/d, weakness, HA, back pain, dizziness,     Allergies    azithromycin (Hives; Pruritus)    Intolerances                            8.9    8.53  )-----------( 345      ( 23 Jul 2019 08:26 )             29.1     07-23    137  |  100  |  49<H>  ----------------------------<  352<H>  3.9   |  24  |  1.63<H>    Ca    9.3      23 Jul 2019 06:43  Phos  2.7     07-22  Mg     2.0     07-22      Vital Signs Last 24 Hrs  T(C): 36.5 (23 Jul 2019 23:45), Max: 36.9 (23 Jul 2019 12:07)  T(F): 97.7 (23 Jul 2019 23:45), Max: 98.4 (23 Jul 2019 12:07)  HR: 70 (23 Jul 2019 23:45) (70 - 76)  BP: 117/68 (23 Jul 2019 23:45) (103/61 - 117/72)  BP(mean): --  RR: 20 (23 Jul 2019 23:45) (18 - 20)  SpO2: 100% (23 Jul 2019 23:45) (99% - 100%)        General: AA&Ox, NAD, non-toxic appearing                                                                                                                                                                                                                   HEENT: Head normocephalic and atraumatic; Conjunctiva, sclera, and lids unremarkable; EOMI; PERRLA;                                                                                                                                                                       Neck: Trachea midline; Supple; No crepitus; Thyroid normal; No regional lymphadenopathy; No carotid bruits, No nuchal rigidity, + Lidocaine patch on                        Cardiovascular: RRR, no murmurs, rubs, gallops, or clicks; No lifts, heaves, or thrills felt on palpation; No jugular vein distention; Abdominal aorta without bruit; Abdominal aorta normal on palpation; Renal arteries without bruits;                                                                                                                                          Respiratory: Respiratory effort unremarkable; Respiratory rate and pattern normal; Lungs clear to auscultation bilaterally;                                                                                                                                            Gastrointestinal: Flat; Normal bowel sounds; Abdomen soft and non- tenderness;                                                 Musculoskeletal: + mild peripheral edema                                                   A&P  Pt is a 72 yo woman with PMHx of HTN, T2DM (A1c 7.4%), CAD s/p CABG (LIMA to LAD, SVG to OM, SVG to PDA 2014 at [facility]), non-dilated ICM (EF 20-25%), severe mitral regurgitation s/p mitral clip (6/13/19 Dr. Newman), severe pulm HTN, CKD, hypothyroidism,  Now with cp and neck pain,   EKG at bedside ( x3 q 15  min NSR no acute changes),  CM: trops at 65, will repeat at 3 am and with AM labs, pt with troponemia at a baseline CK wnl   cxr preformed, no consolidation   s/p 0.5mg  Morphine, 0.25 mg hydromorphone, Pepcid 20 mg with improvement of symptoms, Notified by RN pt c/o of cp  Pt examined at a bed side, states " I don't fell good", pt admits to cp which started "just non" non-radiating, midsternal, pressure like, rated at 8/10. pain reproducible with light palpation.   Pt admits to sob, however saturating at 100% on 3 L NS, talking full sentences, NAD.  Pt admits to neck pain  Pt denies abd pain, n/v/c/d, weakness, HA, back pain, dizziness,     Allergies    azithromycin (Hives; Pruritus)    Intolerances                            8.9    8.53  )-----------( 345      ( 23 Jul 2019 08:26 )             29.1     07-23    137  |  100  |  49<H>  ----------------------------<  352<H>  3.9   |  24  |  1.63<H>    Ca    9.3      23 Jul 2019 06:43  Phos  2.7     07-22  Mg     2.0     07-22      Vital Signs Last 24 Hrs  T(C): 36.5 (23 Jul 2019 23:45), Max: 36.9 (23 Jul 2019 12:07)  T(F): 97.7 (23 Jul 2019 23:45), Max: 98.4 (23 Jul 2019 12:07)  HR: 70 (23 Jul 2019 23:45) (70 - 76)  BP: 117/68 (23 Jul 2019 23:45) (103/61 - 117/72)  BP(mean): --  RR: 20 (23 Jul 2019 23:45) (18 - 20)  SpO2: 100% (23 Jul 2019 23:45) (99% - 100%)        General: AA&Ox, NAD, non-toxic appearing                                                                                                                                                                                                                   HEENT: Head normocephalic and atraumatic; Conjunctiva, sclera, and lids unremarkable; EOMI; PERRLA;                                                                                                                                                                       Neck: Trachea midline; Supple; No crepitus; Thyroid normal; No regional lymphadenopathy; No carotid bruits, No nuchal rigidity, + Lidocaine patch on                        Cardiovascular: RRR, no murmurs, rubs, gallops, or clicks; No lifts, heaves, or thrills felt on palpation; No jugular vein distention; Abdominal aorta without bruit; Abdominal aorta normal on palpation; Renal arteries without bruits;                                                                                                                                          Respiratory: Respiratory effort unremarkable; Respiratory rate and pattern normal; Lungs clear to auscultation bilaterally;                                                                                                                                            Gastrointestinal: Flat; Normal bowel sounds; Abdomen soft and non- tenderness;                                                 Musculoskeletal: + mild peripheral edema                                                   A&P  Pt is a 74 yo woman with PMHx of HTN, T2DM (A1c 7.4%), CAD s/p CABG (LIMA to LAD, SVG to OM, SVG to PDA 2014 at [facility]), non-dilated ICM (EF 20-25%), severe mitral regurgitation s/p mitral clip (6/13/19 Dr. Newman), severe pulm HTN, CKD, hypothyroidism,  Now with cp and neck pain, unclear in etiology, VSS, physical exam unremarkable  Innervations:   EKG at bedside ( x3 q 15  min NSR no acute changes),  CM: trops at 65, will repeat at 3 am and with AM labs, pt with troponemia at a baseline CK wnl   cxr preformed, no consolidation   s/p 0.5mg  Morphine, 0.25 mg hydromorphone, Pepcid 20 mg, Xanex 0.25 mg PO  with improvement of symptoms,  ABG - ( 24 Jul 2019 00:02 )  pH, Arterial: 7.47  pH, Blood: x     /  pCO2: 31    /  pO2: 175   / HCO3: 23    / Base Excess: -.1   /  SaO2: 99    mild resp alkalosis, pt placed on venti mask, will order repeat abg for AM.   - Notified by RN pt c/o of cp  Pt examined at a bed side, states " I don't fell good", pt admits to cp which started "just non" non-radiating, midsternal, pressure like, rated at 8/10. pain reproducible with light palpation.   Pt admits to sob, however saturating at 100% on 3 L NS, talking full sentences, NAD.  Pt admits to neck pain  Pt denies abd pain, n/v/c/d, weakness, HA, back pain, dizziness,     Allergies    azithromycin (Hives; Pruritus)    Intolerances                            8.9    8.53  )-----------( 345      ( 23 Jul 2019 08:26 )             29.1     07-23    137  |  100  |  49<H>  ----------------------------<  352<H>  3.9   |  24  |  1.63<H>    Ca    9.3      23 Jul 2019 06:43  Phos  2.7     07-22  Mg     2.0     07-22      Vital Signs Last 24 Hrs  T(C): 36.5 (23 Jul 2019 23:45), Max: 36.9 (23 Jul 2019 12:07)  T(F): 97.7 (23 Jul 2019 23:45), Max: 98.4 (23 Jul 2019 12:07)  HR: 70 (23 Jul 2019 23:45) (70 - 76)  BP: 117/68 (23 Jul 2019 23:45) (103/61 - 117/72)  BP(mean): --  RR: 20 (23 Jul 2019 23:45) (18 - 20)  SpO2: 100% (23 Jul 2019 23:45) (99% - 100%)        General: AA&Ox, NAD, non-toxic appearing                                                                                                                                                                                                                   HEENT: Head normocephalic and atraumatic; Conjunctiva, sclera, and lids unremarkable; EOMI; PERRLA;                                                                                                                                                                       Neck: Trachea midline; Supple; No crepitus; Thyroid normal; No regional lymphadenopathy; No carotid bruits, No nuchal rigidity, + Lidocaine patch on                        Cardiovascular: RRR, no murmurs, rubs, gallops, or clicks; No lifts, heaves, or thrills felt on palpation; No jugular vein distention; Abdominal aorta without bruit; Abdominal aorta normal on palpation; Renal arteries without bruits;                                                                                                                                          Respiratory: Respiratory effort unremarkable; Respiratory rate and pattern normal; Lungs clear to auscultation bilaterally;                                                                                                                                            Gastrointestinal: Flat; Normal bowel sounds; Abdomen soft and non- tenderness;                                                 Musculoskeletal: + mild peripheral edema                                                   A&P  Pt is a 72 yo woman with PMHx of HTN, T2DM (A1c 7.4%), CAD s/p CABG (LIMA to LAD, SVG to OM, SVG to PDA 2014 at [facility]), non-dilated ICM (EF 20-25%), severe mitral regurgitation s/p mitral clip (6/13/19 Dr. Newman), severe pulm HTN, CKD, hypothyroidism,  Now with cp and neck pain, unclear in etiology, VSS, physical exam unremarkable, No events on tele   Innervations:   EKG at bedside ( x3 q 15  min NSR no acute changes),  CM: trops at 65, will repeat at 3 am and with AM labs, pt with troponemia at a baseline CK wnl   cxr preformed, lungs clear  s/p 0.5mg  Morphine IVP, 0.25 mg hydromorphone IVP , Lasix 20 mg IVP Pepcid 20 mg IVP, Xanex 0.25 mg PO  with improvement of symptoms,  ABG - ( 24 Jul 2019 00:02 )  pH, Arterial: 7.47  pH, Blood: x     /  pCO2: 31    /  pO2: 175   / HCO3: 23    / Base Excess: -.1   /  SaO2: 99    mild resp alkalosis, pt placed on venti mask, will order repeat abg for AM.     #pt now resting comfortably, cp resolved    - f/u 3 AM cardiac ezymes  - f/u AM abg  -cont tele  - will endorse to primary team in AM Notified by RN pt c/o of cp  Pt examined at a bed side, states " I don't fell good", pt admits to cp which started "just non" non-radiating, midsternal, pressure like, rated at 8/10. pain reproducible with light palpation.   Pt admits to sob, however saturating at 100% on 3 L NS, talking full sentences, NAD.  Pt admits to neck pain  Pt denies abd pain, n/v/c/d, weakness, HA, back pain, dizziness,     Allergies    azithromycin (Hives; Pruritus)    Intolerances                            8.9    8.53  )-----------( 345      ( 23 Jul 2019 08:26 )             29.1     07-23    137  |  100  |  49<H>  ----------------------------<  352<H>  3.9   |  24  |  1.63<H>    Ca    9.3      23 Jul 2019 06:43  Phos  2.7     07-22  Mg     2.0     07-22      Vital Signs Last 24 Hrs  T(C): 36.5 (23 Jul 2019 23:45), Max: 36.9 (23 Jul 2019 12:07)  T(F): 97.7 (23 Jul 2019 23:45), Max: 98.4 (23 Jul 2019 12:07)  HR: 70 (23 Jul 2019 23:45) (70 - 76)  BP: 117/68 (23 Jul 2019 23:45) (103/61 - 117/72)  BP(mean): --  RR: 20 (23 Jul 2019 23:45) (18 - 20)  SpO2: 100% (23 Jul 2019 23:45) (99% - 100%)        General: AA&Ox, NAD, non-toxic appearing                                                                                                                                                                                                                   HEENT: Head normocephalic and atraumatic; Conjunctiva, sclera, and lids unremarkable; EOMI; PERRLA;                                                                                                                                                                       Neck: Trachea midline; Supple; No crepitus; Thyroid normal; No regional lymphadenopathy; No carotid bruits, No nuchal rigidity, + Lidocaine patch on                        Cardiovascular: RRR, no murmurs, rubs, gallops, or clicks; No lifts, heaves, or thrills felt on palpation; No jugular vein distention; Abdominal aorta without bruit; Abdominal aorta normal on palpation; Renal arteries without bruits;                                                                                                                                          Respiratory: Respiratory effort unremarkable; Respiratory rate and pattern normal; Lungs clear to auscultation bilaterally;                                                                                                                                            Gastrointestinal: Flat; Normal bowel sounds; Abdomen soft and non- tenderness;                                                 Musculoskeletal: + mild peripheral edema                                                   A&P  Pt is a 72 yo woman with PMHx of HTN, T2DM (A1c 7.4%), CAD s/p CABG (LIMA to LAD, SVG to OM, SVG to PDA 2014 at [facility]), non-dilated ICM (EF 20-25%), severe mitral regurgitation s/p mitral clip (6/13/19 Dr. Newman), severe pulm HTN, CKD, hypothyroidism,  Now with cp and neck pain, unclear in etiology, VSS, physical exam unremarkable, No events on tele   Innervations:   EKG at bedside ( x3 q 15  min NSR no acute changes),  CM: trops at 65, will repeat at 3 am and with AM labs, pt with troponemia at a baseline CK wnl   cxr preformed, lungs clear  s/p 0.5mg  Morphine IVP, 0.25 mg hydromorphone IVP , Lasix 20 mg IVP Pepcid 20 mg IVP, Xanex 0.25 mg PO  with improvement of symptoms,  ABG - ( 24 Jul 2019 00:02 )  pH, Arterial: 7.47  pH, Blood: x     /  pCO2: 31    /  pO2: 175   / HCO3: 23    / Base Excess: -.1   /  SaO2: 99    mild resp alkalosis, placed on 3 L NC    #pt now resting comfortably, cp resolved    - f/u 3 AM cardiac ezymes  - f/u AM abg  -cont tele  - will endorse to primary team in AM Notified by RN pt c/o of cp  Pt examined at a bed side, states " I don't fell good", pt admits to cp which started "just non" non-radiating, midsternal, pressure like, rated at 8/10. pain reproducible with light palpation.   Pt admits to sob, however saturating at 100% on 3 L NS, talking full sentences, NAD.  Pt admits to neck pain  Pt denies abd pain, n/v/c/d, weakness, HA, back pain, dizziness,     Allergies    azithromycin (Hives; Pruritus)    Intolerances                            8.9    8.53  )-----------( 345      ( 23 Jul 2019 08:26 )             29.1     07-23    137  |  100  |  49<H>  ----------------------------<  352<H>  3.9   |  24  |  1.63<H>    Ca    9.3      23 Jul 2019 06:43  Phos  2.7     07-22  Mg     2.0     07-22      Vital Signs Last 24 Hrs  T(C): 36.5 (23 Jul 2019 23:45), Max: 36.9 (23 Jul 2019 12:07)  T(F): 97.7 (23 Jul 2019 23:45), Max: 98.4 (23 Jul 2019 12:07)  HR: 70 (23 Jul 2019 23:45) (70 - 76)  BP: 117/68 (23 Jul 2019 23:45) (103/61 - 117/72)  BP(mean): --  RR: 20 (23 Jul 2019 23:45) (18 - 20)  SpO2: 100% (23 Jul 2019 23:45) (99% - 100%)        General: AA&Ox, NAD, non-toxic appearing                                                                                                                                                                                                                   HEENT: Head normocephalic and atraumatic; Conjunctiva, sclera, and lids unremarkable; EOMI; PERRLA;                                                                                                                                                                         Neck: Trachea midline; Supple; No crepitus; Thyroid normal; No regional lymphadenopathy; No carotid bruits, No nuchal rigidity, + Lidocaine patch on                        Cardiovascular: RRR, no murmurs, rubs, gallops, or clicks; No lifts, heaves, or thrills felt on palpation; No jugular vein distention; Abdominal aorta without bruit; Abdominal aorta normal on palpation; Renal arteries without bruits;                                                                                                          Respiratory: Respiratory effort unremarkable; Respiratory rate and pattern normal; Lungs clear to auscultation bilaterally;                                                                                                                                              Gastrointestinal: Flat; Normal bowel sounds; Abdomen soft and non- tenderness;                                                 Musculoskeletal: + mild peripheral edema                                                   A&P  Pt is a 72 yo woman with PMHx of HTN, T2DM (A1c 7.4%), CAD s/p CABG (LIMA to LAD, SVG to OM, SVG to PDA 2014 at [facility]), non-dilated ICM (EF 20-25%), severe mitral regurgitation s/p mitral clip (6/13/19 Dr. Newman), severe pulm HTN, CKD, hypothyroidism,  Now with cp and neck pain, unclear in etiology, VSS, physical exam unremarkable, No events on tele   Innervations:   EKG at bedside ( x3 q 15  min NSR no acute changes),  CM: trops at 65, will repeat at 3 am and with AM labs, pt with troponemia at a baseline CK wnl   cxr preformed, lungs clear  s/p 0.5mg  Morphine IVP, 0.25 mg hydromorphone IVP , Lasix 20 mg IVP Pepcid 20 mg IVP, Xanex 0.25 mg PO  with improvement of symptoms,  ABG - ( 24 Jul 2019 00:02 )  pH, Arterial: 7.47  pH, Blood: x     /  pCO2: 31    /  pO2: 175   / HCO3: 23    / Base Excess: -.1   /  SaO2: 99    mild resp alkalosis, placed on 3 L NC    #pt now resting comfortably, cp resolved    - f/u 3 AM cardiac ezymes  - f/u AM abg  -cont tele  - will endorse to primary team in AM      addendum at 7AM  - pt c/o of neck pain, NAD, Non-toxic appearing, VSS, pt with NSR HR up to 140s on tele 2/2 pain. EKG obtained: no acute changes HR 71 on ekg. will defer rate control as of now and  treat the pain  - Tylenol IV for neck pain   - AM cardiac enzymes still pending

## 2019-07-24 NOTE — PROGRESS NOTE ADULT - ASSESSMENT
Pt is a 74 yo woman with PMHx of HTN, T2DM, CAD s/p CABG (LIMA to LAD, SVG to OM, SVG to PDA 2014 at [facility]), non-dilated ICM (EF 20-25%), severe mitral regurgitation s/p mitral clip (6/13/19 Dr. Newman), severe pulm HTN, CKD, hypothyroidism, who is presenting to ED after experiencing worsening SOB      A/P:      MERVIN  Likely sec to cardiogenic shock  Renal function stable  PT non -oliguric  continue lasix per cardiology  Optimize glucose control  Monitor renal function   Avoid further nephrotoxics, NSAIDS RCA    CKD stage 4:  baseline Scr 1.8-2.0  Renal function fluctuates sec to CHF  Monitor renal function at present    SOB:  in setting of HF  Continue diuretics per cardiology    Acidosis   sec to RF  Improved  mOnitor serum Co2 at present

## 2019-07-24 NOTE — PROGRESS NOTE ADULT - PROBLEM SELECTOR PLAN 1
- Continue Lasix 60 mg IV BID  - Please give dose of metolazone 2.5mg PO x 1 now  - Continue ISDN 30 mg PO TID and hydralazine 50 mg PO TID, hold for SBP < 95

## 2019-07-24 NOTE — GOALS OF CARE CONVERSATION - PERSONAL ADVANCE DIRECTIVE - CONVERSATION DETAILS
GEORGIAW assisted patient in completion of HCP via   #[id number] in patient's primary language of Hebrew.  She identified her dtr Elsa Negrete [phone] / [phone] and her son in law Mohsin Zaman [phone] as her alternate.  Original HCP plus a copy returned to patient/family.  Copy placed on chart and copy to be scanned into [employer]. Copy given to CM.    See Dr. Mcdermott notes for additional information.

## 2019-07-25 LAB
ANION GAP SERPL CALC-SCNC: 14 MMOL/L — SIGNIFICANT CHANGE UP (ref 5–17)
BUN SERPL-MCNC: 44 MG/DL — HIGH (ref 7–23)
CALCIUM SERPL-MCNC: 9.7 MG/DL — SIGNIFICANT CHANGE UP (ref 8.4–10.5)
CHLORIDE SERPL-SCNC: 98 MMOL/L — SIGNIFICANT CHANGE UP (ref 96–108)
CO2 SERPL-SCNC: 25 MMOL/L — SIGNIFICANT CHANGE UP (ref 22–31)
CREAT SERPL-MCNC: 1.58 MG/DL — HIGH (ref 0.5–1.3)
GLUCOSE BLDC GLUCOMTR-MCNC: 198 MG/DL — HIGH (ref 70–99)
GLUCOSE BLDC GLUCOMTR-MCNC: 283 MG/DL — HIGH (ref 70–99)
GLUCOSE BLDC GLUCOMTR-MCNC: 283 MG/DL — HIGH (ref 70–99)
GLUCOSE BLDC GLUCOMTR-MCNC: 326 MG/DL — HIGH (ref 70–99)
GLUCOSE SERPL-MCNC: 266 MG/DL — HIGH (ref 70–99)
HCT VFR BLD CALC: 30 % — LOW (ref 34.5–45)
HGB BLD-MCNC: 9.2 G/DL — LOW (ref 11.5–15.5)
MCHC RBC-ENTMCNC: 28 PG — SIGNIFICANT CHANGE UP (ref 27–34)
MCHC RBC-ENTMCNC: 30.7 GM/DL — LOW (ref 32–36)
MCV RBC AUTO: 91.2 FL — SIGNIFICANT CHANGE UP (ref 80–100)
PLATELET # BLD AUTO: 380 K/UL — SIGNIFICANT CHANGE UP (ref 150–400)
POTASSIUM SERPL-MCNC: 3.8 MMOL/L — SIGNIFICANT CHANGE UP (ref 3.5–5.3)
POTASSIUM SERPL-SCNC: 3.8 MMOL/L — SIGNIFICANT CHANGE UP (ref 3.5–5.3)
RBC # BLD: 3.29 M/UL — LOW (ref 3.8–5.2)
RBC # FLD: 17 % — HIGH (ref 10.3–14.5)
SODIUM SERPL-SCNC: 137 MMOL/L — SIGNIFICANT CHANGE UP (ref 135–145)
WBC # BLD: 8.71 K/UL — SIGNIFICANT CHANGE UP (ref 3.8–10.5)
WBC # FLD AUTO: 8.71 K/UL — SIGNIFICANT CHANGE UP (ref 3.8–10.5)

## 2019-07-25 PROCEDURE — 99233 SBSQ HOSP IP/OBS HIGH 50: CPT

## 2019-07-25 RX ORDER — LEVALBUTEROL 1.25 MG/.5ML
0.63 SOLUTION, CONCENTRATE RESPIRATORY (INHALATION) ONCE
Refills: 0 | Status: COMPLETED | OUTPATIENT
Start: 2019-07-25 | End: 2019-07-25

## 2019-07-25 RX ORDER — HYDROMORPHONE HYDROCHLORIDE 2 MG/ML
0.25 INJECTION INTRAMUSCULAR; INTRAVENOUS; SUBCUTANEOUS ONCE
Refills: 0 | Status: DISCONTINUED | OUTPATIENT
Start: 2019-07-25 | End: 2019-07-25

## 2019-07-25 RX ORDER — METOPROLOL TARTRATE 50 MG
2.5 TABLET ORAL ONCE
Refills: 0 | Status: DISCONTINUED | OUTPATIENT
Start: 2019-07-25 | End: 2019-07-25

## 2019-07-25 RX ORDER — METOPROLOL TARTRATE 50 MG
2.5 TABLET ORAL ONCE
Refills: 0 | Status: COMPLETED | OUTPATIENT
Start: 2019-07-25 | End: 2019-07-25

## 2019-07-25 RX ORDER — DIGOXIN 250 MCG
0.12 TABLET ORAL DAILY
Refills: 0 | Status: DISCONTINUED | OUTPATIENT
Start: 2019-07-25 | End: 2019-08-04

## 2019-07-25 RX ADMIN — ATORVASTATIN CALCIUM 40 MILLIGRAM(S): 80 TABLET, FILM COATED ORAL at 21:14

## 2019-07-25 RX ADMIN — Medication 650 MILLIGRAM(S): at 22:55

## 2019-07-25 RX ADMIN — Medication 50 MILLIGRAM(S): at 21:14

## 2019-07-25 RX ADMIN — Medication 3: at 08:49

## 2019-07-25 RX ADMIN — Medication 650 MILLIGRAM(S): at 15:58

## 2019-07-25 RX ADMIN — Medication 100 MILLIGRAM(S): at 05:31

## 2019-07-25 RX ADMIN — Medication 3: at 12:06

## 2019-07-25 RX ADMIN — POLYETHYLENE GLYCOL 3350 17 GRAM(S): 17 POWDER, FOR SOLUTION ORAL at 11:15

## 2019-07-25 RX ADMIN — SENNA PLUS 2 TABLET(S): 8.6 TABLET ORAL at 21:14

## 2019-07-25 RX ADMIN — Medication 650 MILLIGRAM(S): at 23:30

## 2019-07-25 RX ADMIN — Medication 60 MILLIGRAM(S): at 17:00

## 2019-07-25 RX ADMIN — LEVALBUTEROL 0.63 MILLIGRAM(S): 1.25 SOLUTION, CONCENTRATE RESPIRATORY (INHALATION) at 22:19

## 2019-07-25 RX ADMIN — Medication 24 UNIT(S): at 08:49

## 2019-07-25 RX ADMIN — HYDROMORPHONE HYDROCHLORIDE 0.25 MILLIGRAM(S): 2 INJECTION INTRAMUSCULAR; INTRAVENOUS; SUBCUTANEOUS at 17:30

## 2019-07-25 RX ADMIN — LIDOCAINE 1 PATCH: 4 CREAM TOPICAL at 21:14

## 2019-07-25 RX ADMIN — Medication 2: at 22:19

## 2019-07-25 RX ADMIN — INSULIN GLARGINE 40 UNIT(S): 100 INJECTION, SOLUTION SUBCUTANEOUS at 22:20

## 2019-07-25 RX ADMIN — Medication 50 MILLIGRAM(S): at 15:20

## 2019-07-25 RX ADMIN — ISOSORBIDE DINITRATE 30 MILLIGRAM(S): 5 TABLET ORAL at 05:31

## 2019-07-25 RX ADMIN — PANTOPRAZOLE SODIUM 40 MILLIGRAM(S): 20 TABLET, DELAYED RELEASE ORAL at 08:48

## 2019-07-25 RX ADMIN — Medication 81 MILLIGRAM(S): at 11:15

## 2019-07-25 RX ADMIN — CLOPIDOGREL BISULFATE 75 MILLIGRAM(S): 75 TABLET, FILM COATED ORAL at 11:15

## 2019-07-25 RX ADMIN — Medication 650 MILLIGRAM(S): at 17:00

## 2019-07-25 RX ADMIN — CHLORHEXIDINE GLUCONATE 1 APPLICATION(S): 213 SOLUTION TOPICAL at 08:49

## 2019-07-25 RX ADMIN — Medication 24 UNIT(S): at 12:06

## 2019-07-25 RX ADMIN — ISOSORBIDE DINITRATE 30 MILLIGRAM(S): 5 TABLET ORAL at 21:13

## 2019-07-25 RX ADMIN — ISOSORBIDE DINITRATE 30 MILLIGRAM(S): 5 TABLET ORAL at 15:20

## 2019-07-25 RX ADMIN — LIDOCAINE 1 PATCH: 4 CREAM TOPICAL at 07:00

## 2019-07-25 RX ADMIN — Medication 24 UNIT(S): at 17:01

## 2019-07-25 RX ADMIN — Medication 100 MILLIGRAM(S): at 17:01

## 2019-07-25 RX ADMIN — Medication 3 MILLIGRAM(S): at 21:14

## 2019-07-25 RX ADMIN — Medication 50 MICROGRAM(S): at 05:31

## 2019-07-25 RX ADMIN — Medication 50 MILLIGRAM(S): at 05:31

## 2019-07-25 RX ADMIN — MONTELUKAST 10 MILLIGRAM(S): 4 TABLET, CHEWABLE ORAL at 11:15

## 2019-07-25 RX ADMIN — Medication 1: at 17:01

## 2019-07-25 RX ADMIN — LIDOCAINE 1 PATCH: 4 CREAM TOPICAL at 10:00

## 2019-07-25 RX ADMIN — HYDROMORPHONE HYDROCHLORIDE 0.25 MILLIGRAM(S): 2 INJECTION INTRAMUSCULAR; INTRAVENOUS; SUBCUTANEOUS at 17:01

## 2019-07-25 RX ADMIN — Medication 60 MILLIGRAM(S): at 05:31

## 2019-07-25 RX ADMIN — Medication 0.12 MILLIGRAM(S): at 21:13

## 2019-07-25 NOTE — PROGRESS NOTE ADULT - PROBLEM SELECTOR PLAN 5
- - Continue diuretics as above and continue to monitor - Continue diuretics (change to oral) as above and continue to monitor

## 2019-07-25 NOTE — PROGRESS NOTE ADULT - SUBJECTIVE AND OBJECTIVE BOX
Subjective:  - No acute events overnight  - Reports ongoing generalized weakness and some SOB at rest  - Denies CP, palpitations, lightheadedness, dizziness, or PND    Medications:  acetaminophen   Tablet .. 650 milliGRAM(s) Oral every 6 hours PRN  acetaminophen  IVPB .. 1000 milliGRAM(s) IV Intermittent once  aspirin enteric coated 81 milliGRAM(s) Oral daily  atorvastatin 40 milliGRAM(s) Oral at bedtime  chlorhexidine 2% Cloths 1 Application(s) Topical daily  clopidogrel Tablet 75 milliGRAM(s) Oral daily  dextrose 40% Gel 15 Gram(s) Oral once PRN  dextrose 5%. 1000 milliLiter(s) IV Continuous <Continuous>  dextrose 50% Injectable 12.5 Gram(s) IV Push once  dextrose 50% Injectable 25 Gram(s) IV Push once  dextrose 50% Injectable 25 Gram(s) IV Push once  docusate sodium 100 milliGRAM(s) Oral two times a day  furosemide   Injectable 60 milliGRAM(s) IV Push two times a day  glucagon  Injectable 1 milliGRAM(s) IntraMuscular once PRN  hydrALAZINE 50 milliGRAM(s) Oral every 8 hours  insulin glargine Injectable (LANTUS) 40 Unit(s) SubCutaneous at bedtime  insulin lispro (HumaLOG) corrective regimen sliding scale   SubCutaneous three times a day before meals  insulin lispro (HumaLOG) corrective regimen sliding scale   SubCutaneous at bedtime  insulin lispro Injectable (HumaLOG) 24 Unit(s) SubCutaneous three times a day before meals  isosorbide   dinitrate Tablet (ISORDIL) 30 milliGRAM(s) Oral three times a day  levothyroxine 50 MICROGram(s) Oral daily  lidocaine   Patch 1 Patch Transdermal daily  melatonin 3 milliGRAM(s) Oral at bedtime  montelukast 10 milliGRAM(s) Oral daily  pantoprazole    Tablet 40 milliGRAM(s) Oral before breakfast  polyethylene glycol 3350 17 Gram(s) Oral daily  senna 2 Tablet(s) Oral at bedtime      Physical Exam:    Vitals:  Vital Signs Last 24 Hours  T(C): 36.6 (07-25-19 @ 12:21), Max: 37.6 (07-24-19 @ 20:48)  HR: 72 (07-25-19 @ 12:21) (71 - 76)  BP: 106/62 (07-25-19 @ 12:21) (105/56 - 120/66)  RR: 18 (07-25-19 @ 12:21) (18 - 18)  SpO2: 100% (07-25-19 @ 12:21) (98% - 101%)        I&O's Summary    24 Jul 2019 07:01  -  25 Jul 2019 07:00  --------------------------------------------------------  IN: 460 mL / OUT: 500 mL / NET: -40 mL    25 Jul 2019 07:01  -  25 Jul 2019 12:46  --------------------------------------------------------  IN: 250 mL / OUT: 300 mL / NET: -50 mL      Tele: SR/1st degree AV block, HR 60-80s    General: No distress. Fatigued appearing, frail.   HEENT: EOM intact.  Neck: Neck supple. JVP mildly elevated. No masses  Chest: RLL diminished, otherwise CTA  CV: RRR. Normal S1 and S2. No murmurs, rub, or gallops. Radial pulses normal. No edema.  Abdomen: Soft, non-distended, non-tender  Skin: No rashes or skin breakdown. Warm peripherally.  Neurology: Alert and oriented times three. Sensation intact  Psych: Affect normal    Labs:                        9.2    8.71  )-----------( 380      ( 25 Jul 2019 07:58 )             30.0     07-25    137  |  98  |  44<H>  ----------------------------<  266<H>  3.8   |  25  |  1.58<H>    Ca    9.7      25 Jul 2019 06:15        CARDIAC MARKERS ( 24 Jul 2019 03:03 )  x     / x     / 27 U/L / x     / 2.1 ng/mL  CARDIAC MARKERS ( 23 Jul 2019 22:43 )  x     / x     / 26 U/L / x     / 2.1 ng/mL      Creatine Kinase, Serum: 27 U/L (07-24-19 @ 03:03)  Creatine Kinase, Serum: 26 U/L (07-23-19 @ 22:43)  Creatine Kinase, Serum: 27 U/L (07-24-19 @ 03:03)  Creatine Kinase, Serum: 26 U/L (07-23-19 @ 22:43)        Lactate, Blood: 1.8 mmol/L (07-24 @ 06:31)  Lactate, Blood: 2.7 mmol/L (07-23 @ 23:54) Subjective:  - No acute events overnight  - Reports ongoing generalized weakness and some SOB at rest  - Denies CP, palpitations, lightheadedness, dizziness, or PND    Medications:  acetaminophen   Tablet .. 650 milliGRAM(s) Oral every 6 hours PRN  acetaminophen  IVPB .. 1000 milliGRAM(s) IV Intermittent once  aspirin enteric coated 81 milliGRAM(s) Oral daily  atorvastatin 40 milliGRAM(s) Oral at bedtime  chlorhexidine 2% Cloths 1 Application(s) Topical daily  clopidogrel Tablet 75 milliGRAM(s) Oral daily  dextrose 40% Gel 15 Gram(s) Oral once PRN  dextrose 5%. 1000 milliLiter(s) IV Continuous <Continuous>  dextrose 50% Injectable 12.5 Gram(s) IV Push once  dextrose 50% Injectable 25 Gram(s) IV Push once  dextrose 50% Injectable 25 Gram(s) IV Push once  docusate sodium 100 milliGRAM(s) Oral two times a day  furosemide   Injectable 60 milliGRAM(s) IV Push two times a day  glucagon  Injectable 1 milliGRAM(s) IntraMuscular once PRN  hydrALAZINE 50 milliGRAM(s) Oral every 8 hours  insulin glargine Injectable (LANTUS) 40 Unit(s) SubCutaneous at bedtime  insulin lispro (HumaLOG) corrective regimen sliding scale   SubCutaneous three times a day before meals  insulin lispro (HumaLOG) corrective regimen sliding scale   SubCutaneous at bedtime  insulin lispro Injectable (HumaLOG) 24 Unit(s) SubCutaneous three times a day before meals  isosorbide   dinitrate Tablet (ISORDIL) 30 milliGRAM(s) Oral three times a day  levothyroxine 50 MICROGram(s) Oral daily  lidocaine   Patch 1 Patch Transdermal daily  melatonin 3 milliGRAM(s) Oral at bedtime  montelukast 10 milliGRAM(s) Oral daily  pantoprazole    Tablet 40 milliGRAM(s) Oral before breakfast  polyethylene glycol 3350 17 Gram(s) Oral daily  senna 2 Tablet(s) Oral at bedtime      Physical Exam:    Vitals:  Vital Signs Last 24 Hours  T(C): 36.6 (07-25-19 @ 12:21), Max: 37.6 (07-24-19 @ 20:48)  HR: 72 (07-25-19 @ 12:21) (71 - 76)  BP: 106/62 (07-25-19 @ 12:21) (105/56 - 120/66)  RR: 18 (07-25-19 @ 12:21) (18 - 18)  SpO2: 100% (07-25-19 @ 12:21) (98% - 101%)        I&O's Summary    24 Jul 2019 07:01  -  25 Jul 2019 07:00  --------------------------------------------------------  IN: 460 mL / OUT: 500 mL / NET: -40 mL    25 Jul 2019 07:01  -  25 Jul 2019 12:46  --------------------------------------------------------  IN: 250 mL / OUT: 300 mL / NET: -50 mL      Tele: SR/1st degree AV block, HR 60-80s    General: No distress. Fatigued appearing, frail.   HEENT: EOM intact.  Neck: Neck supple. JVP mildly elevated with HJR. No masses  Chest: RLL diminished, otherwise CTA  CV: RRR. Normal S1 and S2. No murmurs, rub, or gallops. Radial pulses normal. No edema.  Abdomen: Soft, non-distended, non-tender  Skin: No rashes or skin breakdown. Warm peripherally.  Neurology: Alert and oriented times three. Sensation intact  Psych: Affect normal    Labs:                        9.2    8.71  )-----------( 380      ( 25 Jul 2019 07:58 )             30.0     07-25    137  |  98  |  44<H>  ----------------------------<  266<H>  3.8   |  25  |  1.58<H>    Ca    9.7      25 Jul 2019 06:15        CARDIAC MARKERS ( 24 Jul 2019 03:03 )  x     / x     / 27 U/L / x     / 2.1 ng/mL  CARDIAC MARKERS ( 23 Jul 2019 22:43 )  x     / x     / 26 U/L / x     / 2.1 ng/mL      Creatine Kinase, Serum: 27 U/L (07-24-19 @ 03:03)  Creatine Kinase, Serum: 26 U/L (07-23-19 @ 22:43)  Creatine Kinase, Serum: 27 U/L (07-24-19 @ 03:03)  Creatine Kinase, Serum: 26 U/L (07-23-19 @ 22:43)        Lactate, Blood: 1.8 mmol/L (07-24 @ 06:31)  Lactate, Blood: 2.7 mmol/L (07-23 @ 23:54)

## 2019-07-25 NOTE — PROGRESS NOTE ADULT - ATTENDING COMMENTS
Change to oral diuretics as above. I agree with ongoing discussion regarding end of life care. Please call us with any further questions.     Please call me with questions at [phone].

## 2019-07-25 NOTE — PROGRESS NOTE ADULT - ASSESSMENT
Pt is a 74 yo woman with PMHx of HTN, T2DM, CAD s/p CABG (LIMA to LAD, SVG to OM, SVG to PDA 2014 at [facility]), non-dilated ICM (EF 20-25%), severe mitral regurgitation s/p mitral clip (6/13/19 Dr. Newman), severe pulm HTN, CKD, hypothyroidism, who is presented with worsening SOB, 2/2 acute on chronic systolic heart failure.

## 2019-07-25 NOTE — PROGRESS NOTE ADULT - PROBLEM SELECTOR PLAN 1
- Continue Lasix 60 mg IV BID  - Continue ISDN 30 mg PO TID and hydralazine 50 mg PO TID, hold for SBP < 95 - Change diuretics to lasix 40 mg PO BID  - Continue ISDN 30 mg PO TID and hydralazine 50 mg PO TID, hold for SBP < 95

## 2019-07-25 NOTE — CONSULT NOTE ADULT - SUBJECTIVE AND OBJECTIVE BOX
HPI:    Patient is 73 year old woman with history of HTN, T2DM, CAD s/p CABG (LIMA to LAD, SVG to OM, SVG to PDA 2014 at [facility]), non-dilated ICM (EF 20-25%), severe mitral regurgitation s/p mitral clip (6/13/19 Dr. Newman), severe pulm HTN, CKD, hypothyroidism initially presented to ED after experiencing worsening SOB and intermittent chest pain at [facility]. Endocrine team consulted for diabetes management.     Hospital course:  Patient was initially admitted to the CCU for acute hypoxic respiratory failure secondary to acute decompensated heart failure. Patient was on Lasix drip for diuresis. A [place] was eventually placed for better hemodynamic monitoring. The patient's hemodynamics continued to improve with diuresis, and eventually Dobutamine was successfully weaned off. The patient's volume status/CVP also improved and lasix gtt was eventually discontinued, and the swan was discontinued.      Course complicated by leukocytosis, and found to have positive urine cultures for ESBL. The patient received a dose of vancomycin, and several doses of zosyn. After the ESBL sensitivities came back, the patient was briefly placed on Meropenem (renally dosed). ID was consulted and recommended the patient to be monitored off antibiotics.      After patient was transferred to the telemetry floor, course complicated by SVT (Digoxin held since 7/16 due to elevated Dig level) and elevated troponin to 600 (now downtrending).  Of note, palliative consulted during the admission as patient's family previously expressed  that they do not wish for advanced life support.      Endocrine History:  Patient has been diagnosed with T2DM for years. Her home regimen include Humalog 24 units TIDAC and Lantus 50 units QHS.     In terms of her hypothyroidism, she is on Levothyroxine 50 mcg.       PMH & Surgical Hx:Shortness of breath  H/o or current diagnosis of HF- Contraindication to ACEI/ARBs  H/o or current diagnosis of HF- ACEI/ARB contraindication unknown  H/o or current diagnosis of HF- ACEI/ARB contraindication unknown  H/o or current diagnosis of HF- Contraindication to ACEI/ARBs  H/o or current diagnosis of HF- no contraindication to ACEI/ARBs  Family history of hypertension (Sibling)  Family history of diabetes mellitus (Sibling)  No pertinent family history in first degree relatives  No pertinent family history in first degree relatives  No pertinent family history in first degree relatives  Handoff  MEWS Score  Urinary tract infection  GERD (gastroesophageal reflux disease)  Hypertension  CAD (coronary artery disease)  Hypothyroidism  Hyperlipidemia  Diabetes mellitus, type 2  Diabetes mellitus type 2 in nonobese  Shortness of breath  Anorexia  Anemia  Palliative care encounter  Debility  MERVIN (acute kidney injury)  Fatigue, unspecified type  Acute renal failure  SVT (supraventricular tachycardia)  CAD (coronary artery disease)  Severe mitral regurgitation  Acute on chronic systolic heart failure  Fluid overload  S/P CABG (coronary artery bypass graft)  HEART FAILURE, UNSPECIFIED  15      FH:  DM:  Thyroid:  Autoimmune:  Other:    SH:  Smoking  Etoh:  Recreational Drugs:  Social Life:    Current Meds:  acetaminophen   Tablet .. 650 milliGRAM(s) Oral every 6 hours PRN  acetaminophen  IVPB .. 1000 milliGRAM(s) IV Intermittent once  aspirin enteric coated 81 milliGRAM(s) Oral daily  atorvastatin 40 milliGRAM(s) Oral at bedtime  chlorhexidine 2% Cloths 1 Application(s) Topical daily  clopidogrel Tablet 75 milliGRAM(s) Oral daily  dextrose 40% Gel 15 Gram(s) Oral once PRN  dextrose 5%. 1000 milliLiter(s) IV Continuous <Continuous>  dextrose 50% Injectable 12.5 Gram(s) IV Push once  dextrose 50% Injectable 25 Gram(s) IV Push once  dextrose 50% Injectable 25 Gram(s) IV Push once  docusate sodium 100 milliGRAM(s) Oral two times a day  furosemide   Injectable 60 milliGRAM(s) IV Push two times a day  glucagon  Injectable 1 milliGRAM(s) IntraMuscular once PRN  hydrALAZINE 50 milliGRAM(s) Oral every 8 hours  insulin glargine Injectable (LANTUS) 40 Unit(s) SubCutaneous at bedtime  insulin lispro (HumaLOG) corrective regimen sliding scale   SubCutaneous three times a day before meals  insulin lispro (HumaLOG) corrective regimen sliding scale   SubCutaneous at bedtime  insulin lispro Injectable (HumaLOG) 24 Unit(s) SubCutaneous three times a day before meals  isosorbide   dinitrate Tablet (ISORDIL) 30 milliGRAM(s) Oral three times a day  levothyroxine 50 MICROGram(s) Oral daily  lidocaine   Patch 1 Patch Transdermal daily  melatonin 3 milliGRAM(s) Oral at bedtime  montelukast 10 milliGRAM(s) Oral daily  pantoprazole    Tablet 40 milliGRAM(s) Oral before breakfast  polyethylene glycol 3350 17 Gram(s) Oral daily  senna 2 Tablet(s) Oral at bedtime      Allergies:  azithromycin (Hives; Pruritus)      ROS:  Denies the following except as indicated.    General: weight loss/weight gain, decreased appetite, fatigue  Eyes: Blurry vision, double vision, visual changes  ENT: Throat pain, changes in voice,   CV: palpitations, SOB, CP, cough  GI: NVD, difficulty swallowing, abdominal pain  : polyuria, dysuria  Endo: abnormal menses, temperature intolerance, decreased libido  MSK: weakness, joint pain  Skin: rash, dryness, diaphoresis  Heme: Easy bruising,bleeding  Neuro: HA, dizziness, lightheadedness, numbness tingling  Psych: Anxiety, Depression    Vital Signs Last 24 Hrs  T(C): 36.7 (25 Jul 2019 04:30), Max: 37.6 (24 Jul 2019 20:48)  T(F): 98 (25 Jul 2019 04:30), Max: 99.6 (24 Jul 2019 20:48)  HR: 76 (25 Jul 2019 04:30) (70 - 76)  BP: 112/65 (25 Jul 2019 04:30) (105/56 - 120/66)  BP(mean): --  RR: 18 (25 Jul 2019 04:30) (18 - 18)  SpO2: 100% (25 Jul 2019 04:30) (97% - 101%)        Constitutional: wn/wd in NAD.   HEENT: NCAT, MMM, OP clear, EOMI, , no proptosis or lid retraction  Neck: no thyromegaly or palpable thyroid nodules   Respiratory: lungs CTAB.  Cardiovascular: regular rhythm, normal S1 and S2, no audible murmurs, no peripheral edema  GI: soft, NT/ND, no masses/HSM appreciated.  Neurology: no tremors, DTR 2+  Skin: no visible rashes/lesions  Psychiatric: AAO x 3, normal affect/mood.  Ext: radial pulses intact, DP pulses intact, extremities warm, no cyanosis, clubbing or edema.       LABS:                        9.2    8.71  )-----------( 380      ( 25 Jul 2019 07:58 )             30.0     07-25    137  |  98  |  44<H>  ----------------------------<  266<H>  3.8   |  25  |  1.58<H>    Ca    9.7      25 Jul 2019 06:15          Hemoglobin A1C, Whole Blood: 7.4 (07-05 @ 22:32)  Hemoglobin A1C, Whole Blood: 7.8 (06-01 @ 08:00)    Thyroid Stimulating Hormone, Serum: 2.00 (07-05 @ 22:32)  Thyroid Stimulating Hormone, Serum: 0.75 (06-01 @ 08:00)  Thyroid Stimulating Hormone, Serum: 3.44 (05-07 @ 07:00)  Thyroid Stimulating Hormone, Serum: 1.59 (04-25 @ 05:45)  Thyroid Stimulating Hormone, Serum: 2.03 (09-25 @ 04:20)      RADIOLOGY & ADDITIONAL STUDIES:  CAPILLARY BLOOD GLUCOSE      POCT Blood Glucose.: 283 mg/dL (25 Jul 2019 11:35)  POCT Blood Glucose.: 283 mg/dL (25 Jul 2019 08:11)  POCT Blood Glucose.: 251 mg/dL (24 Jul 2019 21:29)  POCT Blood Glucose.: 249 mg/dL (24 Jul 2019 16:36)  POCT Blood Glucose.: 369 mg/dL (24 Jul 2019 12:11)

## 2019-07-25 NOTE — PROGRESS NOTE ADULT - ATTENDING COMMENTS
Discuss with MoHsin today  Discuss with MoHsin today   Apprised of needs and discussed overall goals of the family  He discussed medical optimization and stated that if she is medical ready then they will take her home.  They would like to readdress goals of care discussion once she is ready to go home.  He feels this is premature if she is not being discharged soon despite being advised to work on these items concurrently.

## 2019-07-25 NOTE — PROGRESS NOTE ADULT - ASSESSMENT
Pt is a 74 yo woman with PMHx of HTN, T2DM, CAD s/p CABG (LIMA to LAD, SVG to OM, SVG to PDA 2014 at [facility]), non-dilated ICM (EF 20-25%), severe mitral regurgitation s/p mitral clip (6/13/19 Dr. Newman), severe pulm HTN, CKD, hypothyroidism, who is presenting to ED after experiencing worsening SOB      A/P:      MERVIN  Likely sec to cardiogenic shock  Renal function stable  PT non -oliguric  continue lasix per cardiology  Optimize glucose control  Monitor renal function   Avoid further nephrotoxics, NSAIDS RCA    CKD stage 4:  baseline Scr 1.8-2.0  Renal function fluctuates sec to CHF  Monitor renal function at present    SOB:  in setting of HF  Continue diuretics per cardiology    Acidosis   sec to RF  Improved  mOnitor serum Co2 at present

## 2019-07-25 NOTE — PROGRESS NOTE ADULT - PROBLEM SELECTOR PLAN 6
As above, Spoke with patient with video , [employer] #[phone] ([place].) Patient was informed about GOC discussion with HCP daughter Elsa 7/24/19. Patient indicated understanding. Conveyed to patient importance of communicating with daughter and family about her wishes/thoughts. She said she continues to be tired and weak. Elsa and family will be contacted afternoon of 7/25 for discussion about patient's care going forward.

## 2019-07-25 NOTE — PROGRESS NOTE ADULT - PROBLEM SELECTOR PLAN 1
No hypogeusia  No dysgeusia   Likely multifactorial  Will consider nonpharmacologic strategies for now

## 2019-07-25 NOTE — PROGRESS NOTE ADULT - SUBJECTIVE AND OBJECTIVE BOX
CHIEF COMPLAINT:    SUBJECTIVE:     REVIEW OF SYSTEMS:    CONSTITUTIONAL: (  )  weakness,  (  ) fevers or chills  EYES/ENT: (  )visual changes;     NECK: (  ) pain or stiffness  RESPIRATORY:   (  )cough, wheezing, hemoptysis;  (  ) shortness of breath  CARDIOVASCULAR:  (  )chest pain or palpitations  GASTROINTESTINAL:   (  )abdominal or epigastric pain.  (  ) nausea, vomiting, or hematemesis;   (   ) diarrhea or constipation.   GENITOURINARY:   (    ) dysuria, frequency or hematuria  NEUROLOGICAL:  (   ) numbness or weakness   All other review of systems is negative unless indicated above    Vital Signs Last 24 Hrs  T(C): 36.7 (25 Jul 2019 04:30), Max: 37.6 (24 Jul 2019 20:48)  T(F): 98 (25 Jul 2019 04:30), Max: 99.6 (24 Jul 2019 20:48)  HR: 76 (25 Jul 2019 04:30) (70 - 76)  BP: 112/65 (25 Jul 2019 04:30) (105/56 - 120/66)  BP(mean): --  RR: 18 (25 Jul 2019 04:30) (18 - 18)  SpO2: 100% (25 Jul 2019 04:30) (97% - 101%)    I&O's Summary    24 Jul 2019 07:01  -  25 Jul 2019 07:00  --------------------------------------------------------  IN: 460 mL / OUT: 500 mL / NET: -40 mL        CAPILLARY BLOOD GLUCOSE      POCT Blood Glucose.: 283 mg/dL (25 Jul 2019 08:11)  POCT Blood Glucose.: 251 mg/dL (24 Jul 2019 21:29)  POCT Blood Glucose.: 249 mg/dL (24 Jul 2019 16:36)  POCT Blood Glucose.: 369 mg/dL (24 Jul 2019 12:11)      PHYSICAL EXAM:    Constitutional:  (   ) NAD,   (   )awake and alert  HEENT: PERR, EOMI,    Neck: Soft and supple, No LAD, No JVD  Respiratory:  (    Breath sounds are clear bilaterally,    (   ) wheezing, rales or rhonchi  Cardiovascular:     (   )S1 and S2, regular rate and rhythm, no Murmurs, gallops or rubs  Gastrointestinal:  (   )Bowel Sounds present, soft,   (  )nontender, nondistended,    Extremities:    (  ) peripheral edema  Vascular: 2+ peripheral pulses  Neurological:    (    )A/O x 3,   (  ) focal deficits  Musculoskeletal:    (   )  normal strength b/l upper  (     ) normal  lower extremities  Skin: No rashes    MEDICATIONS:  MEDICATIONS  (STANDING):  acetaminophen  IVPB .. 1000 milliGRAM(s) IV Intermittent once  aspirin enteric coated 81 milliGRAM(s) Oral daily  atorvastatin 40 milliGRAM(s) Oral at bedtime  chlorhexidine 2% Cloths 1 Application(s) Topical daily  clopidogrel Tablet 75 milliGRAM(s) Oral daily  dextrose 5%. 1000 milliLiter(s) (50 mL/Hr) IV Continuous <Continuous>  dextrose 50% Injectable 12.5 Gram(s) IV Push once  dextrose 50% Injectable 25 Gram(s) IV Push once  dextrose 50% Injectable 25 Gram(s) IV Push once  docusate sodium 100 milliGRAM(s) Oral two times a day  furosemide   Injectable 60 milliGRAM(s) IV Push two times a day  hydrALAZINE 50 milliGRAM(s) Oral every 8 hours  insulin glargine Injectable (LANTUS) 40 Unit(s) SubCutaneous at bedtime  insulin lispro (HumaLOG) corrective regimen sliding scale   SubCutaneous three times a day before meals  insulin lispro (HumaLOG) corrective regimen sliding scale   SubCutaneous at bedtime  insulin lispro Injectable (HumaLOG) 24 Unit(s) SubCutaneous three times a day before meals  isosorbide   dinitrate Tablet (ISORDIL) 30 milliGRAM(s) Oral three times a day  levothyroxine 50 MICROGram(s) Oral daily  lidocaine   Patch 1 Patch Transdermal daily  melatonin 3 milliGRAM(s) Oral at bedtime  montelukast 10 milliGRAM(s) Oral daily  pantoprazole    Tablet 40 milliGRAM(s) Oral before breakfast  polyethylene glycol 3350 17 Gram(s) Oral daily  senna 2 Tablet(s) Oral at bedtime      LABS: All Labs Reviewed:                        9.2    8.71  )-----------( 380      ( 25 Jul 2019 07:58 )             30.0     07-25    137  |  98  |  44<H>  ----------------------------<  266<H>  3.8   |  25  |  1.58<H>    Ca    9.7      25 Jul 2019 06:15        CARDIAC MARKERS ( 24 Jul 2019 03:03 )  x     / x     / 27 U/L / x     / 2.1 ng/mL  CARDIAC MARKERS ( 23 Jul 2019 22:43 )  x     / x     / 26 U/L / x     / 2.1 ng/mL      Blood Culture:   Urine Culture      RADIOLOGY/EKG:    ASSESSMENT AND PLAN:    DVT PPX:    ADVANCED DIRECTIVE:    DISPOSITION: CHIEF COMPLAINT:  Patient out of bed to chair she seems to be improving today but still she has elevated blood sugar will try to adjust her insulin Endo consult requested discussed with the NP's  SUBJECTIVE:     REVIEW OF SYSTEMS:    CONSTITUTIONAL: (  x)  weakness,  (  ) fevers or chills  EYES/ENT: (  )visual changes;     NECK: (  ) pain or stiffness  RESPIRATORY:   (  )cough, wheezing, hemoptysis;  ( x ) shortness of breath  CARDIOVASCULAR:  (  )chest pain or palpitations  GASTROINTESTINAL:   (  )abdominal or epigastric pain.  (  ) nausea, vomiting, or hematemesis;   (   ) diarrhea or constipation.   GENITOURINARY:   (    ) dysuria, frequency or hematuria  NEUROLOGICAL:  (   ) numbness or weakness   All other review of systems is negative unless indicated above    Vital Signs Last 24 Hrs  T(C): 36.7 (25 Jul 2019 04:30), Max: 37.6 (24 Jul 2019 20:48)  T(F): 98 (25 Jul 2019 04:30), Max: 99.6 (24 Jul 2019 20:48)  HR: 76 (25 Jul 2019 04:30) (70 - 76)  BP: 112/65 (25 Jul 2019 04:30) (105/56 - 120/66)  BP(mean): --  RR: 18 (25 Jul 2019 04:30) (18 - 18)  SpO2: 100% (25 Jul 2019 04:30) (97% - 101%)    I&O's Summary    24 Jul 2019 07:01  -  25 Jul 2019 07:00  --------------------------------------------------------  IN: 460 mL / OUT: 500 mL / NET: -40 mL        CAPILLARY BLOOD GLUCOSE      POCT Blood Glucose.: 283 mg/dL (25 Jul 2019 08:11)  POCT Blood Glucose.: 251 mg/dL (24 Jul 2019 21:29)  POCT Blood Glucose.: 249 mg/dL (24 Jul 2019 16:36)  POCT Blood Glucose.: 369 mg/dL (24 Jul 2019 12:11)      PHYSICAL EXAM:    Constitutional:  (  x ) NAD,   (   )awake and alert  HEENT: PERR, EOMI,    Neck: Soft and supple, No LAD, No JVD  Respiratory:  (   x Breath sounds are clear bilaterally,    (   ) wheezing, rales or rhonchi  Cardiovascular:     ( x  )S1 and S2, regular rate and rhythm, no Murmurs, gallops or rubs  Gastrointestinal:  (  x )Bowel Sounds present, soft,   (  )nontender, nondistended,    Extremities:    (  ) peripheral edema  Vascular: 2+ peripheral pulses  Neurological:    ( x   )A/O ,   (  ) focal deficits  Musculoskeletal:    (   )  normal strength b/l upper  (     ) normal  lower extremities  Skin: No rashes    MEDICATIONS:  MEDICATIONS  (STANDING):  acetaminophen  IVPB .. 1000 milliGRAM(s) IV Intermittent once  aspirin enteric coated 81 milliGRAM(s) Oral daily  atorvastatin 40 milliGRAM(s) Oral at bedtime  chlorhexidine 2% Cloths 1 Application(s) Topical daily  clopidogrel Tablet 75 milliGRAM(s) Oral daily  dextrose 5%. 1000 milliLiter(s) (50 mL/Hr) IV Continuous <Continuous>  dextrose 50% Injectable 12.5 Gram(s) IV Push once  dextrose 50% Injectable 25 Gram(s) IV Push once  dextrose 50% Injectable 25 Gram(s) IV Push once  docusate sodium 100 milliGRAM(s) Oral two times a day  furosemide   Injectable 60 milliGRAM(s) IV Push two times a day  hydrALAZINE 50 milliGRAM(s) Oral every 8 hours  insulin glargine Injectable (LANTUS) 40 Unit(s) SubCutaneous at bedtime  insulin lispro (HumaLOG) corrective regimen sliding scale   SubCutaneous three times a day before meals  insulin lispro (HumaLOG) corrective regimen sliding scale   SubCutaneous at bedtime  insulin lispro Injectable (HumaLOG) 24 Unit(s) SubCutaneous three times a day before meals  isosorbide   dinitrate Tablet (ISORDIL) 30 milliGRAM(s) Oral three times a day  levothyroxine 50 MICROGram(s) Oral daily  lidocaine   Patch 1 Patch Transdermal daily  melatonin 3 milliGRAM(s) Oral at bedtime  montelukast 10 milliGRAM(s) Oral daily  pantoprazole    Tablet 40 milliGRAM(s) Oral before breakfast  polyethylene glycol 3350 17 Gram(s) Oral daily  senna 2 Tablet(s) Oral at bedtime      LABS: All Labs Reviewed:                        9.2    8.71  )-----------( 380      ( 25 Jul 2019 07:58 )             30.0     07-25    137  |  98  |  44<H>  ----------------------------<  266<H>  3.8   |  25  |  1.58<H>    Ca    9.7      25 Jul 2019 06:15        CARDIAC MARKERS ( 24 Jul 2019 03:03 )  x     / x     / 27 U/L / x     / 2.1 ng/mL  CARDIAC MARKERS ( 23 Jul 2019 22:43 )  x     / x     / 26 U/L / x     / 2.1 ng/mL      Blood Culture:   Urine Culture      RADIOLOGY/EKG:    ASSESSMENT AND PLAN:  Pt is a 74 yo woman with PMHx of HTN, T2DM (A1c 7.4%), CAD s/p CABG (LIMA to LAD, SVG to OM, SVG to PDA 2014 at [facility]), non-dilated ICM (EF 20-25%), severe mitral regurgitation s/p mitral clip (6/13/19 Dr. Newman), severe pulm HTN, CKD, hypothyroidism, who is presenting to ED after experiencing worsening SOB and intermittent chest pain for 1 week at [facility]. Of note, pt was recently discharged on 6/19 after having mitral clip procedure completed. She initially required BiPAP with improvement in her respiratory status following diuresis. Initiated on vasopressors and inotropes in CCU, which were subsequently weaned off. Infectious work up was negative.    She remains volume overloaded with NYHA Class IIIb symptoms. She is not adequately responding to current diuretic regimen. Her weight is unchanged today despite increase in diuretics. Her Scrt is continuing to downtrend and now close to baseline. She is normotensive tolerating uptitration of vasodilators.     Problem/Plan - 1:  ·  Problem: Acute on chronic systolic heart failure.  Plan: - Please increase ISDN to 20 mg PO TID, hold for SBP < 90  -  on lasix to 60 mg IV BID Patient condition  better will change to po  in  AM	  - Continue hydralazine 50mg PO TID, hold for SBP < 90  - No ARB/ARNI/MRA at this time due to renal dysfunction  - Will hold on initiation of beta block until euvolemic  - Daily standing weights, strict I &Os.     Problem/Plan - 2:  ·  Problem: Severe mitral regurgitation.  Plan: - s/p Mitral clip   - TTE on 7/9: mild MR.     Problem/Plan - 3:  ·  Problem:  DM insulin glargine Injectable (LANTUS) 40 Unit(s) SubCutaneous at bedtime We will ask Endocrinologist to see the patient  insulin lispro (HumaLOG) corrective regimen sliding scale   SubCutaneous three times a day before meals  insulin lispro (HumaLOG) corrective regimen sliding scale   SubCutaneous at bedtime  insulin lispro Injectable (HumaLOG) 11 Unit(s) SubCutaneous three times a day before meals        Problem/Plan - 4:  ·  Problem: SVT (supraventricular tachycardia).  Plan: - Currently SR  - Continue to monitor on tele    -  CAD (coronary artery disease).  Plan: -Continue ASA, clopidogrel.  - Recheck digoxin level on Friday 7/19 as it was critically high on 7/16.     Problem/Plan - 5:  ·  Problem: Acute renal failure/Anemia Receive  1 unit RBC  - Avoid nephrotoxins.   DVT PPX:    ADVANCED DIRECTIVE:    DISPOSITION: possible discharge home versus rehab will discuss with family also there will be a meeting the family todayDiscussed with the discharge planning team  DVT PPX:    ADVANCED DIRECTIVE:    DISPOSITION:

## 2019-07-25 NOTE — PROGRESS NOTE ADULT - SUBJECTIVE AND OBJECTIVE BOX
[facility]   MD RAHEEL SHAH MD RUORU WONG, PA    TEL:  OFFICE: [phone]  DR SANTOYO CELL: [phone]  JUSTEN KENDALL CELL: [phone]  DR. NGUYEN CELL: [phone]    RENAL FOLLOW UP NOTE  --------------------------------------------------------------------------------  HPI:      Pt seen and examined at bedside.   + SOB,  no chest pain     PAST HISTORY  --------------------------------------------------------------------------------  No significant changes to PMH, PSH, FHx, SHx, unless otherwise noted    ALLERGIES & MEDICATIONS  --------------------------------------------------------------------------------  Allergies    azithromycin (Hives; Pruritus)    Intolerances      Standing Inpatient Medications  acetaminophen  IVPB .. 1000 milliGRAM(s) IV Intermittent once  aspirin enteric coated 81 milliGRAM(s) Oral daily  atorvastatin 40 milliGRAM(s) Oral at bedtime  chlorhexidine 2% Cloths 1 Application(s) Topical daily  clopidogrel Tablet 75 milliGRAM(s) Oral daily  dextrose 5%. 1000 milliLiter(s) IV Continuous <Continuous>  dextrose 50% Injectable 12.5 Gram(s) IV Push once  dextrose 50% Injectable 25 Gram(s) IV Push once  dextrose 50% Injectable 25 Gram(s) IV Push once  docusate sodium 100 milliGRAM(s) Oral two times a day  furosemide   Injectable 60 milliGRAM(s) IV Push two times a day  hydrALAZINE 50 milliGRAM(s) Oral every 8 hours  insulin glargine Injectable (LANTUS) 40 Unit(s) SubCutaneous at bedtime  insulin lispro (HumaLOG) corrective regimen sliding scale   SubCutaneous three times a day before meals  insulin lispro (HumaLOG) corrective regimen sliding scale   SubCutaneous at bedtime  insulin lispro Injectable (HumaLOG) 24 Unit(s) SubCutaneous three times a day before meals  isosorbide   dinitrate Tablet (ISORDIL) 30 milliGRAM(s) Oral three times a day  levothyroxine 50 MICROGram(s) Oral daily  lidocaine   Patch 1 Patch Transdermal daily  melatonin 3 milliGRAM(s) Oral at bedtime  montelukast 10 milliGRAM(s) Oral daily  pantoprazole    Tablet 40 milliGRAM(s) Oral before breakfast  polyethylene glycol 3350 17 Gram(s) Oral daily  senna 2 Tablet(s) Oral at bedtime    PRN Inpatient Medications  acetaminophen   Tablet .. 650 milliGRAM(s) Oral every 6 hours PRN  dextrose 40% Gel 15 Gram(s) Oral once PRN  glucagon  Injectable 1 milliGRAM(s) IntraMuscular once PRN      REVIEW OF SYSTEMS  --------------------------------------------------------------------------------  General: no fever  CVS: no chest pain  RESP: + sob, no cough  ABD: no abdominal pain  : no dysuria,  MSK: no edema     VITALS/PHYSICAL EXAM  --------------------------------------------------------------------------------  T(C): 36.7 (07-25-19 @ 04:30), Max: 37.6 (07-24-19 @ 20:48)  HR: 76 (07-25-19 @ 04:30) (70 - 76)  BP: 112/65 (07-25-19 @ 04:30) (105/56 - 120/66)  RR: 18 (07-25-19 @ 04:30) (18 - 18)  SpO2: 100% (07-25-19 @ 04:30) (97% - 101%)  Wt(kg): --        07-24-19 @ 07:01  -  07-25-19 @ 07:00  --------------------------------------------------------  IN: 460 mL / OUT: 500 mL / NET: -40 mL      Physical Exam:  	Gen: NAD  	HEENT: MMM  	Pulm: decreased breath sounds  B/L  	CV: S1S2  	Abd: Soft, +BS  	Ext: No LE edema B/L                      Neuro: Awake   	Skin: Warm and Dry   	 no polo  LABS/STUDIES  --------------------------------------------------------------------------------              9.2    8.71  >-----------<  380      [07-25-19 @ 07:58]              30.0     137  |  98  |  44  ----------------------------<  266      [07-25-19 @ 06:15]  3.8   |  25  |  1.58        Ca     9.7     [07-25-19 @ 06:15]          CK 27      [07-24-19 @ 03:03]    Creatinine Trend:  SCr 1.58 [07-25 @ 06:15]  SCr 1.48 [07-24 @ 06:39]  SCr 1.53 [07-23 @ 23:54]  SCr 1.63 [07-23 @ 06:43]  SCr 1.67 [07-22 @ 06:30]    Urinalysis - [07-09-19 @ 13:28]      Color Yellow / Appearance Clear / SG 1.012 / pH 6.0      Gluc Negative / Ketone Negative  / Bili Negative / Urobili Negative       Blood Trace / Protein Trace / Leuk Est Negative / Nitrite Negative      RBC 1 / WBC 1 / Hyaline 0 / Gran  / Sq Epi  / Non Sq Epi 0 / Bacteria Moderate      Iron 42, TIBC 348, %sat 12      [07-05-19 @ 22:32]  Ferritin 130      [07-05-19 @ 22:32]  .4 (Ca --)      [04-25-19 @ 05:45]   --  PTH 43.55 (Ca --)      [09-10-18 @ 07:15]   --  PTH 36.60 (Ca --)      [09-08-18 @ 03:15]   --  Vitamin D (25OH) 25.9      [05-01-19 @ 04:00]  HbA1c 7.4      [07-05-19 @ 22:32]  TSH 2.00      [07-05-19 @ 22:32]  Lipid: chol 148, , HDL 35,       [06-01-19 @ 08:00]

## 2019-07-25 NOTE — PROGRESS NOTE ADULT - SUBJECTIVE AND OBJECTIVE BOX
PERTINENT PMH REVIEWED:  [ x] YES [ ] NO           SOCIAL HISTORY:  Significant other/partner:  [ ] YES  [x ] NO            Children:  [x] YES  [ ] NO                   Buddhist/Spirituality: Restorationist  Substance hx:  [ ] YES   [x ] NO           Tobacco hx:  [ ] YES  [x ] NO             Alcohol hx: [ ] YES  [x ] NO        Home Opioid hx:  [ ] YES  [ x] NO   Living Situation: [ ] Home  [ ] Long term care  [x ] Rehab    REFERRALS:   [ ] Chaplaincy  [ ] Hospice  [ ] Child Life  [ ] Social Work  [ ] Case management [ ] Holistic Therapy     FAMILY HISTORY:  Family history of hypertension (Sibling): sister  Family history of diabetes mellitus (Sibling): brothers/sisters    BASELINE ADLs (prior to admission):  Independent [ ] moderately [ ] fully   Dependent   [ x] moderately [ ] fully    ADVANCE DIRECTIVES:  [ ] YES [x ] NO   DNR [ ] YES [x ] NO                      MOLST  [ ] YES [ x] NO    Living Will  [ ] YES [ ] NO    Health Care Proxy [ ] YES  [ ] NO      [ ] Surrogate  [x ] HCP  [ ] Guardian: Elsa Negrete- patient's daughter     Phone#: [phone]    Allergies    azithromycin (Hives; Pruritus)    Intolerances    MEDICATIONS  (STANDING):  aspirin enteric coated 81 milliGRAM(s) Oral daily  atorvastatin 40 milliGRAM(s) Oral at bedtime  chlorhexidine 2% Cloths 1 Application(s) Topical daily  clopidogrel Tablet 75 milliGRAM(s) Oral daily  dextrose 5%. 1000 milliLiter(s) (50 mL/Hr) IV Continuous <Continuous>  dextrose 50% Injectable 12.5 Gram(s) IV Push once  dextrose 50% Injectable 25 Gram(s) IV Push once  dextrose 50% Injectable 25 Gram(s) IV Push once  docusate sodium 100 milliGRAM(s) Oral two times a day  furosemide   Injectable 40 milliGRAM(s) IV Push two times a day  hydrALAZINE 50 milliGRAM(s) Oral every 8 hours  insulin glargine Injectable (LANTUS) 28 Unit(s) SubCutaneous at bedtime  insulin lispro (HumaLOG) corrective regimen sliding scale   SubCutaneous three times a day before meals  insulin lispro (HumaLOG) corrective regimen sliding scale   SubCutaneous at bedtime  insulin lispro Injectable (HumaLOG) 11 Unit(s) SubCutaneous three times a day before meals  isosorbide   dinitrate Tablet (ISORDIL) 10 milliGRAM(s) Oral three times a day  levothyroxine 50 MICROGram(s) Oral daily  lidocaine   Patch 1 Patch Transdermal daily  melatonin 3 milliGRAM(s) Oral at bedtime  montelukast 10 milliGRAM(s) Oral daily  pantoprazole    Tablet 40 milliGRAM(s) Oral before breakfast  polyethylene glycol 3350 17 Gram(s) Oral daily  senna 2 Tablet(s) Oral at bedtime    MEDICATIONS  (PRN):  acetaminophen   Tablet .. 650 milliGRAM(s) Oral every 6 hours PRN Mild Pain (1 - 3), Moderate Pain (4 - 6)  dextrose 40% Gel 15 Gram(s) Oral once PRN Blood Glucose LESS THAN 70 milliGRAM(s)/deciliter  glucagon  Injectable 1 milliGRAM(s) IntraMuscular once PRN Glucose LESS THAN 70 milligrams/deciliter      PRESENT SYMPTOMS:  Source: [ x] Patient   [ ] Family   [ ] Team     Pain: [x ] YES [ ] NO (neck)  OLDCARTS:     Dyspnea on exertion: [ x] YES [ ] NO   SOB: [x ] YES [ ] NO  Anxiety: [x ] YES [ ] NO  Fatigue: [x ] YES [ ] NO   Nausea: [ ] YES [x ] NO  Loss of appetite: [ ] YES [x ] NO   Constipation: [ ] YES [ x] NO     Other Symptoms:  [ ] All other review of systems negative   [ ] Unable to obtain due to poor mentation     Karnofsky Performance Score/Palliative Performance Status Version 2:   40-50      %  Protein Calorie Malutrition:  [x ] Mild   [ ] Moderate   [ ] Severe     Vital Signs Last 24 Hrs  T(C): 37.1 (17 Jul 2019 04:26), Max: 37.2 (16 Jul 2019 20:38)  T(F): 98.8 (17 Jul 2019 04:26), Max: 98.9 (16 Jul 2019 20:38)  HR: 67 (17 Jul 2019 04:26) (67 - 75)  BP: 109/61 (17 Jul 2019 04:26) (100/56 - 109/61)  BP(mean): --  RR: 18 (17 Jul 2019 04:26) (18 - 19)  SpO2: 100% (17 Jul 2019 04:26) (98% - 100%)    Physical Exam:    General: [ x] Alert,  A&O x 2    [x ] lethargic   [ ] Agitated   [ ] Cachexia   HEENT: [x ] Normal   [ ] Dry mouth   [ ] ET Tube    [ ] Trach   Lungs: [x ] Clear [ ] Rhonchi  [ ] Crackles [ ] Wheezing [ ] Tachypnea  [ ] Audible excessive secretions   Cardiovascular:  [x ] Regular rate and rhythm  [ ] Irregular [ ] Tachycardia   [ ] Bradycardia   Abdomen: [x ] Soft  [ ] Distended  [ ]  [x ] +BS  [x ] Non tender [ ] Tender  [ ]PEG   [ ] NGT   Last BM:     Genitourinary: [x ] Normal [ ] Incontinent   [ ] Oliguria/Anuria   [ ] Oscar  Musculoskeletal:  [ ] Normal   [x ] Generalized weakness  [ ] Bedbound   Neurological: [x ] No focal deficits  [ ] Cognitive impairment     Skin: [ x] Normal   [ ] Pressure ulcers     LABS:                        7.5    8.22  )-----------( 284      ( 17 Jul 2019 09:19 )             24.3     07-17    133<L>  |  98  |  70<H>  ----------------------------<  286<H>  4.7   |  21<L>  |  2.02<H>    Ca    9.3      17 Jul 2019 06:35  Phos  4.1     07-16  Mg     2.3     07-16    TPro  7.4  /  Alb  3.5  /  TBili  0.2  /  DBili  x   /  AST  30  /  ALT  131<H>  /  AlkPhos  133<H>  07-16        I&O's Summary    16 Jul 2019 07:01  -  17 Jul 2019 07:00  --------------------------------------------------------  IN: 120 mL / OUT: 1500 mL / NET: -1380 mL    17 Jul 2019 07:01  -  17 Jul 2019 11:50  --------------------------------------------------------  IN: 240 mL / OUT: 400 mL / NET: -160 mL    GOC Discussion:  Spoke with patient with video , [employer] #[id number] ([place].) Patient was informed about GOC discussion with HCP daughter Elsa 7/24/19. Patient indicated understanding. Conveyed to patient importance of communicating with daughter and family about her wishes/thoughts. She said she continues to be tired and weak. Elsa and family will be contacted afternoon of 7/25 for discussion about patient's care going forward.

## 2019-07-25 NOTE — PROGRESS NOTE ADULT - ASSESSMENT
Pt is a 74 yo woman with PMHx of HTN, T2DM (A1c 7.4%), CAD s/p CABG (LIMA to LAD, SVG to OM, SVG to PDA 2014 at [facility]), non-dilated ICM (EF 20-25%), severe mitral regurgitation s/p mitral clip (6/13/19 Dr. Newman), severe pulm HTN, CKD, hypothyroidism, who is presenting to ED after [facility] and [facility]. Of note, pt was recently discharged on 6/19 after having mitral clip procedure completed. She initially required BiPAP with improvement in her respiratory status following diuresis. Initiated on vasopressors and inotropes in CCU, which were subsequently weaned off. Infectious work up was negative.    Her weight is down 0.5 kg since yesterday and is close to euvolemic on exam. Her SCr slightly increased today and she is normotensive on moderate doses of vasodilators. Pt is a 72 yo woman with PMHx of HTN, T2DM (A1c 7.4%), CAD s/p CABG (LIMA to LAD, SVG to OM, SVG to PDA 2014 at [facility]), non-dilated ICM (EF 20-25%), severe mitral regurgitation s/p mitral clip (6/13/19 Dr. Newman), severe pulm HTN, CKD, hypothyroidism, who is presenting to ED after [facility] and [facility] Of note, pt was recently discharged on 6/19 after having mitral clip procedure completed. She initially required BiPAP with improvement in her respiratory status following diuresis. Initiated on vasopressors and inotropes in CCU, which were subsequently weaned off. Infectious work up was negative.    Her weight is down 0.5 kg since yesterday, but is symptomatically unchanged and continues to have evidence of mildly elevated filling pressures. Her SCr slightly increased today and she is normotensive on moderate doses of vasodilators.

## 2019-07-25 NOTE — PROGRESS NOTE ADULT - PROBLEM SELECTOR PLAN 6
- s/p unit PRBC with appropriate increased in Hgb. No overt s/s of bleeding. - s/p 1 unit PRBC with appropriate increased in Hgb. No overt s/s of bleeding.

## 2019-07-26 DIAGNOSIS — I10 ESSENTIAL (PRIMARY) HYPERTENSION: ICD-10-CM

## 2019-07-26 DIAGNOSIS — E03.9 HYPOTHYROIDISM, UNSPECIFIED: ICD-10-CM

## 2019-07-26 DIAGNOSIS — E11.29 TYPE 2 DIABETES MELLITUS WITH OTHER DIABETIC KIDNEY COMPLICATION: ICD-10-CM

## 2019-07-26 DIAGNOSIS — I25.812 ATHEROSCLEROSIS OF BYPASS GRAFT OF CORONARY ARTERY OF TRANSPLANTED HEART WITHOUT ANGINA PECTORIS: ICD-10-CM

## 2019-07-26 DIAGNOSIS — N18.4 CHRONIC KIDNEY DISEASE, STAGE 4 (SEVERE): ICD-10-CM

## 2019-07-26 DIAGNOSIS — E78.2 MIXED HYPERLIPIDEMIA: ICD-10-CM

## 2019-07-26 LAB
ANION GAP SERPL CALC-SCNC: 15 MMOL/L — SIGNIFICANT CHANGE UP (ref 5–17)
BUN SERPL-MCNC: 48 MG/DL — HIGH (ref 7–23)
CALCIUM SERPL-MCNC: 9.9 MG/DL — SIGNIFICANT CHANGE UP (ref 8.4–10.5)
CHLORIDE SERPL-SCNC: 101 MMOL/L — SIGNIFICANT CHANGE UP (ref 96–108)
CO2 SERPL-SCNC: 24 MMOL/L — SIGNIFICANT CHANGE UP (ref 22–31)
CREAT SERPL-MCNC: 1.68 MG/DL — HIGH (ref 0.5–1.3)
GLUCOSE BLDC GLUCOMTR-MCNC: 155 MG/DL — HIGH (ref 70–99)
GLUCOSE BLDC GLUCOMTR-MCNC: 180 MG/DL — HIGH (ref 70–99)
GLUCOSE BLDC GLUCOMTR-MCNC: 208 MG/DL — HIGH (ref 70–99)
GLUCOSE BLDC GLUCOMTR-MCNC: 291 MG/DL — HIGH (ref 70–99)
GLUCOSE BLDC GLUCOMTR-MCNC: 332 MG/DL — HIGH (ref 70–99)
GLUCOSE SERPL-MCNC: 263 MG/DL — HIGH (ref 70–99)
HCT VFR BLD CALC: 29.9 % — LOW (ref 34.5–45)
HGB BLD-MCNC: 10.2 G/DL — LOW (ref 11.5–15.5)
MCHC RBC-ENTMCNC: 29.9 PG — SIGNIFICANT CHANGE UP (ref 27–34)
MCHC RBC-ENTMCNC: 34.1 GM/DL — SIGNIFICANT CHANGE UP (ref 32–36)
MCV RBC AUTO: 87.7 FL — SIGNIFICANT CHANGE UP (ref 80–100)
PLATELET # BLD AUTO: 380 K/UL — SIGNIFICANT CHANGE UP (ref 150–400)
POTASSIUM SERPL-MCNC: 3.8 MMOL/L — SIGNIFICANT CHANGE UP (ref 3.5–5.3)
POTASSIUM SERPL-SCNC: 3.8 MMOL/L — SIGNIFICANT CHANGE UP (ref 3.5–5.3)
RBC # BLD: 3.41 M/UL — LOW (ref 3.8–5.2)
RBC # FLD: 16.9 % — HIGH (ref 10.3–14.5)
SODIUM SERPL-SCNC: 140 MMOL/L — SIGNIFICANT CHANGE UP (ref 135–145)
WBC # BLD: 7.6 K/UL — SIGNIFICANT CHANGE UP (ref 3.8–10.5)
WBC # FLD AUTO: 7.6 K/UL — SIGNIFICANT CHANGE UP (ref 3.8–10.5)

## 2019-07-26 RX ORDER — INSULIN GLARGINE 100 [IU]/ML
46 INJECTION, SOLUTION SUBCUTANEOUS AT BEDTIME
Refills: 0 | Status: DISCONTINUED | OUTPATIENT
Start: 2019-07-26 | End: 2019-07-27

## 2019-07-26 RX ORDER — INSULIN LISPRO 100/ML
26 VIAL (ML) SUBCUTANEOUS
Refills: 0 | Status: DISCONTINUED | OUTPATIENT
Start: 2019-07-26 | End: 2019-07-30

## 2019-07-26 RX ADMIN — LIDOCAINE 1 PATCH: 4 CREAM TOPICAL at 22:25

## 2019-07-26 RX ADMIN — LIDOCAINE 1 PATCH: 4 CREAM TOPICAL at 08:17

## 2019-07-26 RX ADMIN — CHLORHEXIDINE GLUCONATE 1 APPLICATION(S): 213 SOLUTION TOPICAL at 12:00

## 2019-07-26 RX ADMIN — Medication 50 MILLIGRAM(S): at 05:10

## 2019-07-26 RX ADMIN — CHLORHEXIDINE GLUCONATE 1 APPLICATION(S): 213 SOLUTION TOPICAL at 22:23

## 2019-07-26 RX ADMIN — Medication 24 UNIT(S): at 08:11

## 2019-07-26 RX ADMIN — ISOSORBIDE DINITRATE 30 MILLIGRAM(S): 5 TABLET ORAL at 22:22

## 2019-07-26 RX ADMIN — Medication 26 UNIT(S): at 12:00

## 2019-07-26 RX ADMIN — Medication 60 MILLIGRAM(S): at 05:10

## 2019-07-26 RX ADMIN — Medication 3: at 08:10

## 2019-07-26 RX ADMIN — Medication 1: at 17:16

## 2019-07-26 RX ADMIN — Medication 50 MILLIGRAM(S): at 22:22

## 2019-07-26 RX ADMIN — CLOPIDOGREL BISULFATE 75 MILLIGRAM(S): 75 TABLET, FILM COATED ORAL at 12:40

## 2019-07-26 RX ADMIN — Medication 0.12 MILLIGRAM(S): at 05:10

## 2019-07-26 RX ADMIN — PANTOPRAZOLE SODIUM 40 MILLIGRAM(S): 20 TABLET, DELAYED RELEASE ORAL at 05:11

## 2019-07-26 RX ADMIN — Medication 100 MILLIGRAM(S): at 05:10

## 2019-07-26 RX ADMIN — Medication 650 MILLIGRAM(S): at 13:41

## 2019-07-26 RX ADMIN — Medication 50 MICROGRAM(S): at 05:10

## 2019-07-26 RX ADMIN — SENNA PLUS 2 TABLET(S): 8.6 TABLET ORAL at 22:22

## 2019-07-26 RX ADMIN — Medication 60 MILLIGRAM(S): at 17:58

## 2019-07-26 RX ADMIN — ATORVASTATIN CALCIUM 40 MILLIGRAM(S): 80 TABLET, FILM COATED ORAL at 22:22

## 2019-07-26 RX ADMIN — LIDOCAINE 1 PATCH: 4 CREAM TOPICAL at 09:00

## 2019-07-26 RX ADMIN — Medication 81 MILLIGRAM(S): at 12:40

## 2019-07-26 RX ADMIN — INSULIN GLARGINE 46 UNIT(S): 100 INJECTION, SOLUTION SUBCUTANEOUS at 22:32

## 2019-07-26 RX ADMIN — Medication 650 MILLIGRAM(S): at 23:10

## 2019-07-26 RX ADMIN — MONTELUKAST 10 MILLIGRAM(S): 4 TABLET, CHEWABLE ORAL at 12:39

## 2019-07-26 RX ADMIN — Medication 50 MILLIGRAM(S): at 15:18

## 2019-07-26 RX ADMIN — Medication 4: at 11:59

## 2019-07-26 RX ADMIN — ISOSORBIDE DINITRATE 30 MILLIGRAM(S): 5 TABLET ORAL at 15:18

## 2019-07-26 RX ADMIN — Medication 650 MILLIGRAM(S): at 12:41

## 2019-07-26 RX ADMIN — ISOSORBIDE DINITRATE 30 MILLIGRAM(S): 5 TABLET ORAL at 05:10

## 2019-07-26 RX ADMIN — Medication 3 MILLIGRAM(S): at 22:22

## 2019-07-26 RX ADMIN — Medication 26 UNIT(S): at 17:17

## 2019-07-26 NOTE — CONSULT NOTE ADULT - SUBJECTIVE AND OBJECTIVE BOX
Endocrinology Attending Covering For Dr. Banks    HPI:  Pt is a 72 yo woman with PMHx of HTN, T2DM, for 20 years  CAD s/p CABG (LIMA to LAD, SVG to OM, SVG to PDA 2014 at [facility]), non-dilated ICM (EF 20-25%), severe mitral regurgitation s/p mitral clip (6/13/19 Dr. Newman), severe pulm HTN, CKD, hypothyroidism, who is presenting to ED after experiencing worsening SOB at [place] [facility]. Also complaining of intermittent chest pain. Of note, pt was recently discharged on 6/19 after having mitral clip procedure completed. Pt says that she has been experiencing SOB for about 1 week. However, she was never noted to be hypoxic in rehab until today. She was not able to provide much hx 2/2 SOB and being on bipap. Daughter at bedside reporting that pt was well immediately after discharge. However, she gradually developed worsening SOB. Better with rest initially. Nothing alleviating SOB now. It is constant throughout the day, made worse with exertion. No fevers, chills, nausea, vomiting, cough, sputum production, chest tightness, pressure, palpitations, LOC, syncope.    In the ED, pt afebrile, , /75, RR 22, O2 100% on 2L NC. She subsequently developed worsened work of breathing and was placed on bipap with improvement. She was given , lasix 40mg IVP x1. CXR significant for pulmonary edema. Anemia to 9.3/28.5. BUN/Cr 42/1.72 (54/2.54 on 6/19). BNP 6626. VBG 7.49/29/61/22. TTE pending. (05 Jul 2019 19:02)  Patient has history of diabetes, 20 years ,  on insulin at home, no recent hypoglycemic episodes, positive  polyuria polydipsia. Patient follows up with PCP for diabetes management.  while in hospital she is on Lantus 40 units, and Humalog 24 units pre-each meal, blood sugarers 198- 283, eating well. A1c 7.4%    PAST MEDICAL & SURGICAL HISTORY:  GERD (gastroesophageal reflux disease)  CAD (coronary artery disease): s/p CABG  Hypothyroidism  Hyperlipidemia  Diabetes mellitus, type 2  S/P CABG (coronary artery bypass graft)      FAMILY HISTORY:  Family history of hypertension (Sibling): sister  Family history of diabetes mellitus (Sibling): brothers/sisters      Social History:    Outpatient Medications:    MEDICATIONS  (STANDING):  acetaminophen  IVPB .. 1000 milliGRAM(s) IV Intermittent once  aspirin enteric coated 81 milliGRAM(s) Oral daily  atorvastatin 40 milliGRAM(s) Oral at bedtime  chlorhexidine 2% Cloths 1 Application(s) Topical daily  clopidogrel Tablet 75 milliGRAM(s) Oral daily  dextrose 5%. 1000 milliLiter(s) (50 mL/Hr) IV Continuous <Continuous>  dextrose 50% Injectable 12.5 Gram(s) IV Push once  dextrose 50% Injectable 25 Gram(s) IV Push once  dextrose 50% Injectable 25 Gram(s) IV Push once  digoxin     Tablet 0.125 milliGRAM(s) Oral daily  docusate sodium 100 milliGRAM(s) Oral two times a day  furosemide   Injectable 60 milliGRAM(s) IV Push two times a day  hydrALAZINE 50 milliGRAM(s) Oral every 8 hours  insulin glargine Injectable (LANTUS) 46 Unit(s) SubCutaneous at bedtime  insulin lispro (HumaLOG) corrective regimen sliding scale   SubCutaneous three times a day before meals  insulin lispro (HumaLOG) corrective regimen sliding scale   SubCutaneous at bedtime  insulin lispro Injectable (HumaLOG) 26 Unit(s) SubCutaneous three times a day before meals  isosorbide   dinitrate Tablet (ISORDIL) 30 milliGRAM(s) Oral three times a day  levothyroxine 50 MICROGram(s) Oral daily  lidocaine   Patch 1 Patch Transdermal daily  melatonin 3 milliGRAM(s) Oral at bedtime  montelukast 10 milliGRAM(s) Oral daily  pantoprazole    Tablet 40 milliGRAM(s) Oral before breakfast  polyethylene glycol 3350 17 Gram(s) Oral daily  senna 2 Tablet(s) Oral at bedtime    MEDICATIONS  (PRN):  acetaminophen   Tablet .. 650 milliGRAM(s) Oral every 6 hours PRN Mild Pain (1 - 3), Moderate Pain (4 - 6)  dextrose 40% Gel 15 Gram(s) Oral once PRN Blood Glucose LESS THAN 70 milliGRAM(s)/deciliter  glucagon  Injectable 1 milliGRAM(s) IntraMuscular once PRN Glucose LESS THAN 70 milligrams/deciliter      Allergies    azithromycin (Hives; Pruritus)    Intolerances      Review of Systems:  Constitutional: No fever, no chills  Eyes: No blurry vision  Neuro: No tremors  HEENT: No pain, no neck swelling  Cardiovascular: No chest pain, no palpitations  Respiratory: Has SOB, no cough  GI: No nausea, vomiting, abdominal pain  : No dysuria  Skin: no rash  MSK: Has leg swelling, no foot ulcers.  Psych: no depression  Endocrine: no polyuria, polydipsia    ALL OTHER SYSTEMS REVIEWED AND NEGATIVE    UNABLE TO OBTAIN    PHYSICAL EXAM:  VITALS: T(C): 36.6 (07-26-19 @ 04:25)  T(F): 97.9 (07-26-19 @ 04:25), Max: 97.9 (07-25-19 @ 12:21)  HR: 76 (07-26-19 @ 04:25) (72 - 153)  BP: 116/73 (07-26-19 @ 04:25) (93/59 - 123/84)  RR:  (18 - 18)  SpO2:  (98% - 100%)  Wt(kg): --  GENERAL: NAD, well-groomed, well-developed  EYES: No proptosis, no lid lag  HEENT:  Atraumatic, Normocephalic  THYROID: Normal size, no palpable nodules  RESPIRATORY: Clear to auscultation bilaterally; No rales, rhonchi, wheezing  CARDIOVASCULAR: Si S2, No murmurs;  GI: Soft, non distended, normal bowel sounds  SKIN: Dry, intact, No rashes or lesions  MUSCULOSKELETAL: Has BL lower extremity edema.  NEURO:  no tremor, sensation decreased in feet BL,    POCT Blood Glucose.: 291 mg/dL (07-26-19 @ 07:47)  POCT Blood Glucose.: 326 mg/dL (07-25-19 @ 21:23)  POCT Blood Glucose.: 198 mg/dL (07-25-19 @ 16:38)  POCT Blood Glucose.: 283 mg/dL (07-25-19 @ 11:35)  POCT Blood Glucose.: 283 mg/dL (07-25-19 @ 08:11)  POCT Blood Glucose.: 251 mg/dL (07-24-19 @ 21:29)  POCT Blood Glucose.: 249 mg/dL (07-24-19 @ 16:36)  POCT Blood Glucose.: 369 mg/dL (07-24-19 @ 12:11)  POCT Blood Glucose.: 324 mg/dL (07-24-19 @ 08:10)  POCT Blood Glucose.: 282 mg/dL (07-24-19 @ 02:49)  POCT Blood Glucose.: 320 mg/dL (07-24-19 @ 00:17)  POCT Blood Glucose.: 412 mg/dL (07-23-19 @ 21:13)  POCT Blood Glucose.: 399 mg/dL (07-23-19 @ 21:11)  POCT Blood Glucose.: 323 mg/dL (07-23-19 @ 16:45)  POCT Blood Glucose.: 401 mg/dL (07-23-19 @ 11:39)                            10.2   7.6   )-----------( 380      ( 26 Jul 2019 06:12 )             29.9       07-26    140  |  101  |  48<H>  ----------------------------<  263<H>  3.8   |  24  |  1.68<H>    EGFR if : 35<L>  EGFR if non : 30<L>    Ca    9.9      07-26        Thyroid Function Tests:  07-05 @ 22:32 TSH 2.00 FreeT4 -- T3 -- Anti TPO -- Anti Thyroglobulin Ab -- TSI --      Hemoglobin A1C, Whole Blood: 7.4 % <H> [4.0 - 5.6] (07-05-19 @ 22:32)  Hemoglobin A1C, Whole Blood: 7.8 % <H> [4.0 - 5.6] (06-01-19 @ 08:00)          Radiology:

## 2019-07-26 NOTE — PROGRESS NOTE ADULT - SUBJECTIVE AND OBJECTIVE BOX
CHIEF COMPLAINT:    SUBJECTIVE:     REVIEW OF SYSTEMS:    CONSTITUTIONAL: (  )  weakness,  (  ) fevers or chills  EYES/ENT: (  )visual changes;     NECK: (  ) pain or stiffness  RESPIRATORY:   (  )cough, wheezing, hemoptysis;  (  ) shortness of breath  CARDIOVASCULAR:  (  )chest pain or palpitations  GASTROINTESTINAL:   (  )abdominal or epigastric pain.  (  ) nausea, vomiting, or hematemesis;   (   ) diarrhea or constipation.   GENITOURINARY:   (    ) dysuria, frequency or hematuria  NEUROLOGICAL:  (   ) numbness or weakness   All other review of systems is negative unless indicated above    Vital Signs Last 24 Hrs  T(C): 36.6 (26 Jul 2019 17:56), Max: 36.8 (26 Jul 2019 15:13)  T(F): 97.8 (26 Jul 2019 17:56), Max: 98.2 (26 Jul 2019 15:13)  HR: 70 (26 Jul 2019 17:56) (69 - 78)  BP: 108/50 (26 Jul 2019 17:56) (102/66 - 124/70)  BP(mean): --  RR: 18 (26 Jul 2019 17:56) (18 - 18)  SpO2: 100% (26 Jul 2019 17:56) (99% - 100%)    I&O's Summary    25 Jul 2019 07:01  -  26 Jul 2019 07:00  --------------------------------------------------------  IN: 725 mL / OUT: 1600 mL / NET: -875 mL    26 Jul 2019 07:01  -  26 Jul 2019 19:42  --------------------------------------------------------  IN: 525 mL / OUT: 1075 mL / NET: -550 mL        CAPILLARY BLOOD GLUCOSE      POCT Blood Glucose.: 155 mg/dL (26 Jul 2019 16:43)  POCT Blood Glucose.: 332 mg/dL (26 Jul 2019 11:43)  POCT Blood Glucose.: 291 mg/dL (26 Jul 2019 07:47)  POCT Blood Glucose.: 326 mg/dL (25 Jul 2019 21:23)      PHYSICAL EXAM:    Constitutional:  (  x ) NAD,   (  x )awake and alert  HEENT: PERR, EOMI,    Neck: Soft and supple, No LAD, No JVD  Respiratory:  (   x Breath sounds are clear bilaterally,    (   ) wheezing, rales or rhonchi  Cardiovascular:     ( x  )S1 and S2, regular rate and rhythm, no Murmurs, gallops or rubs  Gastrointestinal:  (  x )Bowel Sounds present, soft,   (  )nontender, nondistended,    Extremities:    (  ) peripheral edema  Vascular: 2+ peripheral pulses  Neurological:    ( x   )A/O ,   (  ) focal deficits  Musculoskeletal:    (   )  normal strength b/l upper  (     ) normal  lower extremities  Skin: No rashes      MEDICATIONS:  MEDICATIONS  (STANDING):  acetaminophen  IVPB .. 1000 milliGRAM(s) IV Intermittent once  aspirin enteric coated 81 milliGRAM(s) Oral daily  atorvastatin 40 milliGRAM(s) Oral at bedtime  chlorhexidine 2% Cloths 1 Application(s) Topical daily  clopidogrel Tablet 75 milliGRAM(s) Oral daily  dextrose 5%. 1000 milliLiter(s) (50 mL/Hr) IV Continuous <Continuous>  dextrose 50% Injectable 12.5 Gram(s) IV Push once  dextrose 50% Injectable 25 Gram(s) IV Push once  dextrose 50% Injectable 25 Gram(s) IV Push once  digoxin     Tablet 0.125 milliGRAM(s) Oral daily  docusate sodium 100 milliGRAM(s) Oral two times a day  furosemide   Injectable 60 milliGRAM(s) IV Push two times a day  hydrALAZINE 50 milliGRAM(s) Oral every 8 hours  insulin glargine Injectable (LANTUS) 46 Unit(s) SubCutaneous at bedtime  insulin lispro (HumaLOG) corrective regimen sliding scale   SubCutaneous three times a day before meals  insulin lispro (HumaLOG) corrective regimen sliding scale   SubCutaneous at bedtime  insulin lispro Injectable (HumaLOG) 26 Unit(s) SubCutaneous three times a day before meals  isosorbide   dinitrate Tablet (ISORDIL) 30 milliGRAM(s) Oral three times a day  levothyroxine 50 MICROGram(s) Oral daily  lidocaine   Patch 1 Patch Transdermal daily  melatonin 3 milliGRAM(s) Oral at bedtime  montelukast 10 milliGRAM(s) Oral daily  pantoprazole    Tablet 40 milliGRAM(s) Oral before breakfast  polyethylene glycol 3350 17 Gram(s) Oral daily  senna 2 Tablet(s) Oral at bedtime      LABS: All Labs Reviewed:                        10.2   7.6   )-----------( 380      ( 26 Jul 2019 06:12 )             29.9     07-26    140  |  101  |  48<H>  ----------------------------<  263<H>  3.8   |  24  |  1.68<H>    Ca    9.9      26 Jul 2019 06:12            Blood Culture:   Urine Culture      RADIOLOGY/EKG:    ASSESSMENT AND PLAN:  Pt is a 74 yo woman with PMHx of HTN, T2DM (A1c 7.4%), CAD s/p CABG (LIMA to LAD, SVG to OM, SVG to PDA 2014 at [facility]), non-dilated ICM (EF 20-25%), severe mitral regurgitation s/p mitral clip (6/13/19 Dr. Newman), severe pulm HTN, CKD, hypothyroidism, who is presenting to ED after experiencing worsening SOB and intermittent chest pain for 1 week at [facility]. Of note, pt was recently discharged on 6/19 after having mitral clip procedure completed. She initially required BiPAP with improvement in her respiratory status following diuresis. Initiated on vasopressors and inotropes in CCU, which were subsequently weaned off. Infectious work up was negative.    She remains volume overloaded with NYHA Class IIIb symptoms. She is not adequately responding to current diuretic regimen. Her weight is unchanged today despite increase in diuretics. Her Scrt is continuing to downtrend and now close to baseline. She is normotensive tolerating uptitration of vasodilators.     Problem/Plan - 1:  ·  Problem: Acute on chronic systolic heart failure.  Plan: - Please increase ISDN to 20 mg PO TID, hold for SBP < 90  -  on lasix to 60 mg IV BID Patient condition  better will change to po   after heart failure follow-up	  - Continue hydralazine 50mg PO TID, hold for SBP < 90  - No ARB/ARNI/MRA at this time due to renal dysfunction  - Will hold on initiation of beta block until euvolemic  - Daily standing weights, strict I &Os.     Problem/Plan - 2:  ·  Problem: Severe mitral regurgitation.  Plan: - s/p Mitral clip   - TTE on 7/9: mild MR.     Problem/Plan - 3:  ·  Problem:  DM insulin glargine Injectable (LANTUS) 46 Unit(s) SubCutaneous at bedtime We will ask Endocrinologist to see the patient  insulin lispro (HumaLOG) corrective regimen sliding scale   SubCutaneous three times a day before meals  insulin lispro (HumaLOG) corrective regimen sliding scale   SubCutaneous at bedtime  insulin lispro Injectable (HumaLOG) 26 Unit(s) SubCutaneous three times a day before meals        Problem/Plan - 4:  ·  Problem: SVT (supraventricular tachycardia).  Plan: - Currently SR  - Continue to monitor on tele    -  CAD (coronary artery disease).  Plan: -Continue ASA, clopidogrel.   continue low-dose digoxin 0.125     Problem/Plan - 5:  ·  Problem: Acute renal failure/Anemia Receive  1 unit RBC  - Avoid nephrotoxins.   DVT PPX:    ADVANCED DIRECTIVE:    DISPOSITION: family wishes to transfer to [facility] for rehab discussed with

## 2019-07-26 NOTE — CONSULT NOTE ADULT - PROBLEM SELECTOR RECOMMENDATION 9
Change Lantus to 46  units qhs, Change Humalog to 26  units before each meal in addition to correction scale coverage, monitor FS will FU  Patient counseled for compliance with consistant low carb dietCAD: Continue treatment, monitoring FU Cardiothocracic team.

## 2019-07-26 NOTE — PROGRESS NOTE ADULT - SUBJECTIVE AND OBJECTIVE BOX
[facility]   MD RAHEEL SHAH MD ANGELA WONG, PA    TEL:  OFFICE: [phone]  DR SANTOYO CELL: [phone]  DR. NGUYEN CELL: [phone]  UMM KENDALL CELL: [phone]        Patient is a 73y old  Female who presents with a chief complaint of Hypoxia, SOB (26 Jul 2019 11:01)      Patient seen and examined at bedside. No chest pain/sob    VITALS:  T(F): 98.2 (07-26-19 @ 15:13), Max: 98.2 (07-26-19 @ 15:13)  HR: 69 (07-26-19 @ 15:13)  BP: 109/54 (07-26-19 @ 15:13)  RR: 18 (07-26-19 @ 15:13)  SpO2: 100% (07-26-19 @ 15:13)  Wt(kg): --    07-25 @ 07:01  -  07-26 @ 07:00  --------------------------------------------------------  IN: 725 mL / OUT: 1600 mL / NET: -875 mL    07-26 @ 07:01  -  07-26 @ 17:15  --------------------------------------------------------  IN: 525 mL / OUT: 875 mL / NET: -350 mL        Weight (kg): 51.6 (07-26 @ 08:20)    PHYSICAL EXAM:  Constitutional: NAD  Neck: No JVD  Respiratory: CTAB, no wheezes, rales or rhonchi  Cardiovascular: S1, S2, RRR  Gastrointestinal: BS+, soft, NT/ND  Extremities: No peripheral edema    Hospital Medications:   MEDICATIONS  (STANDING):  acetaminophen  IVPB .. 1000 milliGRAM(s) IV Intermittent once  aspirin enteric coated 81 milliGRAM(s) Oral daily  atorvastatin 40 milliGRAM(s) Oral at bedtime  chlorhexidine 2% Cloths 1 Application(s) Topical daily  clopidogrel Tablet 75 milliGRAM(s) Oral daily  dextrose 5%. 1000 milliLiter(s) (50 mL/Hr) IV Continuous <Continuous>  dextrose 50% Injectable 12.5 Gram(s) IV Push once  dextrose 50% Injectable 25 Gram(s) IV Push once  dextrose 50% Injectable 25 Gram(s) IV Push once  digoxin     Tablet 0.125 milliGRAM(s) Oral daily  docusate sodium 100 milliGRAM(s) Oral two times a day  furosemide   Injectable 60 milliGRAM(s) IV Push two times a day  hydrALAZINE 50 milliGRAM(s) Oral every 8 hours  insulin glargine Injectable (LANTUS) 46 Unit(s) SubCutaneous at bedtime  insulin lispro (HumaLOG) corrective regimen sliding scale   SubCutaneous three times a day before meals  insulin lispro (HumaLOG) corrective regimen sliding scale   SubCutaneous at bedtime  insulin lispro Injectable (HumaLOG) 26 Unit(s) SubCutaneous three times a day before meals  isosorbide   dinitrate Tablet (ISORDIL) 30 milliGRAM(s) Oral three times a day  levothyroxine 50 MICROGram(s) Oral daily  lidocaine   Patch 1 Patch Transdermal daily  melatonin 3 milliGRAM(s) Oral at bedtime  montelukast 10 milliGRAM(s) Oral daily  pantoprazole    Tablet 40 milliGRAM(s) Oral before breakfast  polyethylene glycol 3350 17 Gram(s) Oral daily  senna 2 Tablet(s) Oral at bedtime      LABS:  07-26    140  |  101  |  48<H>  ----------------------------<  263<H>  3.8   |  24  |  1.68<H>    Ca    9.9      26 Jul 2019 06:12      Creatinine Trend: 1.68 <--, 1.58 <--, 1.48 <--, 1.53 <--, 1.63 <--, 1.67 <--, <0.30 <--, 1.77 <--                                10.2   7.6   )-----------( 380      ( 26 Jul 2019 06:12 )             29.9     Urine Studies:  Urinalysis - [07-09-19 @ 13:28]      Color Yellow / Appearance Clear / SG 1.012 / pH 6.0      Gluc Negative / Ketone Negative  / Bili Negative / Urobili Negative       Blood Trace / Protein Trace / Leuk Est Negative / Nitrite Negative      RBC 1 / WBC 1 / Hyaline 0 / Gran  / Sq Epi  / Non Sq Epi 0 / Bacteria Moderate      Iron 42, TIBC 348, %sat 12      [07-05-19 @ 22:32]  Ferritin 130      [07-05-19 @ 22:32]  .4 (Ca --)      [04-25-19 @ 05:45]   --  PTH 43.55 (Ca --)      [09-10-18 @ 07:15]   --  PTH 36.60 (Ca --)      [09-08-18 @ 03:15]   --  Vitamin D (25OH) 25.9      [05-01-19 @ 04:00]  HbA1c 7.4      [07-05-19 @ 22:32]  TSH 2.00      [07-05-19 @ 22:32]  Lipid: chol 148, , HDL 35,       [06-01-19 @ 08:00]        RADIOLOGY & ADDITIONAL STUDIES:

## 2019-07-26 NOTE — PROGRESS NOTE ADULT - ASSESSMENT
Pt is a 74 yo woman with PMHx of HTN, T2DM, CAD s/p CABG (LIMA to LAD, SVG to OM, SVG to PDA 2014 at [facility]), non-dilated ICM (EF 20-25%), severe mitral regurgitation s/p mitral clip (6/13/19 Dr. Newman), severe pulm HTN, CKD, hypothyroidism, who is presenting to ED after experiencing worsening SOB      A/P:      MERVIN  Likely sec to cardiogenic shock  Renal function stable  PT non -oliguric  continue lasix per cardiology  Optimize glucose control  Monitor renal function   Avoid further nephrotoxics, NSAIDS RCA    CKD stage 4:  baseline Scr 1.8-2.0  Renal function fluctuates sec to CHF  Monitor renal function at present    SOB:  in setting of HF  Continue diuretics per cardiology    Acidosis   sec to RF  Improved  mOnitor serum Co2 at present

## 2019-07-26 NOTE — CONSULT NOTE ADULT - ASSESSMENT
Assessment  DMT2: 73y Female with DM T2 with hyperglycemia on insulin, blood sugars running high,   eating meals,  non compliant with low carb diet.  CAD: S/P cabgOn medications, stable, monitored.  Hypothyroidism:  On synthroid 50  mcg po daily, asymptomatic.  HTN: Controlled, On med.  HLD; on statin  CKD: Monitor labs/BMP,   ESRD: On hemodialysis, Monitor labs/BMP      Plan:   DMT2:  Change Lantus to 46  units qhs, Change Humalog to 26  units before each meal in addition to correction scale coverage, monitor FS will FU  Patient counseled for compliance with consistant low carb dietCAD: Continue treatment, monitoring FU Cardiothocracic team.  CAD: S/P cabg On medications, stable, monitored.  Hypothyroidism:  On synthroid 50  mcg po daily, asymptomatic. F/U tsh as outpatient  HTN: Continue treatment, monitoring, Primary team FU  HLD; on statin  CKD: Continue monitoring labs, renal FU  Thank you for consult will F/U  case discussed with NP  erika Woodard MD  [phone]

## 2019-07-27 LAB
ALBUMIN SERPL ELPH-MCNC: 4.1 G/DL — SIGNIFICANT CHANGE UP (ref 3.3–5)
ALP SERPL-CCNC: 126 U/L — HIGH (ref 40–120)
ALT FLD-CCNC: 37 U/L — SIGNIFICANT CHANGE UP (ref 10–45)
ANION GAP SERPL CALC-SCNC: 16 MMOL/L — SIGNIFICANT CHANGE UP (ref 5–17)
ANION GAP SERPL CALC-SCNC: 19 MMOL/L — HIGH (ref 5–17)
AST SERPL-CCNC: 44 U/L — HIGH (ref 10–40)
BASOPHILS # BLD AUTO: 0.04 K/UL — SIGNIFICANT CHANGE UP (ref 0–0.2)
BASOPHILS NFR BLD AUTO: 0.5 % — SIGNIFICANT CHANGE UP (ref 0–2)
BILIRUB SERPL-MCNC: 0.3 MG/DL — SIGNIFICANT CHANGE UP (ref 0.2–1.2)
BUN SERPL-MCNC: 46 MG/DL — HIGH (ref 7–23)
BUN SERPL-MCNC: 47 MG/DL — HIGH (ref 7–23)
CALCIUM SERPL-MCNC: 10.2 MG/DL — SIGNIFICANT CHANGE UP (ref 8.4–10.5)
CALCIUM SERPL-MCNC: 9.6 MG/DL — SIGNIFICANT CHANGE UP (ref 8.4–10.5)
CHLORIDE SERPL-SCNC: 100 MMOL/L — SIGNIFICANT CHANGE UP (ref 96–108)
CHLORIDE SERPL-SCNC: 102 MMOL/L — SIGNIFICANT CHANGE UP (ref 96–108)
CK MB CFR SERPL CALC: 3.6 NG/ML — SIGNIFICANT CHANGE UP (ref 0–3.8)
CO2 SERPL-SCNC: 22 MMOL/L — SIGNIFICANT CHANGE UP (ref 22–31)
CO2 SERPL-SCNC: 24 MMOL/L — SIGNIFICANT CHANGE UP (ref 22–31)
CREAT SERPL-MCNC: 1.62 MG/DL — HIGH (ref 0.5–1.3)
CREAT SERPL-MCNC: 1.63 MG/DL — HIGH (ref 0.5–1.3)
EOSINOPHIL # BLD AUTO: 0.45 K/UL — SIGNIFICANT CHANGE UP (ref 0–0.5)
EOSINOPHIL NFR BLD AUTO: 5.5 % — SIGNIFICANT CHANGE UP (ref 0–6)
GLUCOSE BLDC GLUCOMTR-MCNC: 145 MG/DL — HIGH (ref 70–99)
GLUCOSE BLDC GLUCOMTR-MCNC: 165 MG/DL — HIGH (ref 70–99)
GLUCOSE BLDC GLUCOMTR-MCNC: 172 MG/DL — HIGH (ref 70–99)
GLUCOSE BLDC GLUCOMTR-MCNC: 175 MG/DL — HIGH (ref 70–99)
GLUCOSE BLDC GLUCOMTR-MCNC: 91 MG/DL — SIGNIFICANT CHANGE UP (ref 70–99)
GLUCOSE BLDC GLUCOMTR-MCNC: 98 MG/DL — SIGNIFICANT CHANGE UP (ref 70–99)
GLUCOSE SERPL-MCNC: 132 MG/DL — HIGH (ref 70–99)
GLUCOSE SERPL-MCNC: 170 MG/DL — HIGH (ref 70–99)
HCT VFR BLD CALC: 29.2 % — LOW (ref 34.5–45)
HCT VFR BLD CALC: 33.7 % — LOW (ref 34.5–45)
HGB BLD-MCNC: 11.1 G/DL — LOW (ref 11.5–15.5)
HGB BLD-MCNC: 8.8 G/DL — LOW (ref 11.5–15.5)
IMM GRANULOCYTES NFR BLD AUTO: 1.8 % — HIGH (ref 0–1.5)
LYMPHOCYTES # BLD AUTO: 1.52 K/UL — SIGNIFICANT CHANGE UP (ref 1–3.3)
LYMPHOCYTES # BLD AUTO: 18.6 % — SIGNIFICANT CHANGE UP (ref 13–44)
MAGNESIUM SERPL-MCNC: 2 MG/DL — SIGNIFICANT CHANGE UP (ref 1.6–2.6)
MCHC RBC-ENTMCNC: 27.3 PG — SIGNIFICANT CHANGE UP (ref 27–34)
MCHC RBC-ENTMCNC: 28.8 PG — SIGNIFICANT CHANGE UP (ref 27–34)
MCHC RBC-ENTMCNC: 30.1 GM/DL — LOW (ref 32–36)
MCHC RBC-ENTMCNC: 32.9 GM/DL — SIGNIFICANT CHANGE UP (ref 32–36)
MCV RBC AUTO: 87.4 FL — SIGNIFICANT CHANGE UP (ref 80–100)
MCV RBC AUTO: 90.7 FL — SIGNIFICANT CHANGE UP (ref 80–100)
MONOCYTES # BLD AUTO: 0.75 K/UL — SIGNIFICANT CHANGE UP (ref 0–0.9)
MONOCYTES NFR BLD AUTO: 9.2 % — SIGNIFICANT CHANGE UP (ref 2–14)
NEUTROPHILS # BLD AUTO: 5.26 K/UL — SIGNIFICANT CHANGE UP (ref 1.8–7.4)
NEUTROPHILS NFR BLD AUTO: 64.4 % — SIGNIFICANT CHANGE UP (ref 43–77)
PHOSPHATE SERPL-MCNC: 3.8 MG/DL — SIGNIFICANT CHANGE UP (ref 2.5–4.5)
PLATELET # BLD AUTO: 356 K/UL — SIGNIFICANT CHANGE UP (ref 150–400)
PLATELET # BLD AUTO: 413 K/UL — HIGH (ref 150–400)
POTASSIUM SERPL-MCNC: 3.2 MMOL/L — LOW (ref 3.5–5.3)
POTASSIUM SERPL-MCNC: 3.7 MMOL/L — SIGNIFICANT CHANGE UP (ref 3.5–5.3)
POTASSIUM SERPL-SCNC: 3.2 MMOL/L — LOW (ref 3.5–5.3)
POTASSIUM SERPL-SCNC: 3.7 MMOL/L — SIGNIFICANT CHANGE UP (ref 3.5–5.3)
PROT SERPL-MCNC: 8.5 G/DL — HIGH (ref 6–8.3)
RBC # BLD: 3.22 M/UL — LOW (ref 3.8–5.2)
RBC # BLD: 3.85 M/UL — SIGNIFICANT CHANGE UP (ref 3.8–5.2)
RBC # FLD: 16.7 % — HIGH (ref 10.3–14.5)
RBC # FLD: 16.7 % — HIGH (ref 10.3–14.5)
SODIUM SERPL-SCNC: 141 MMOL/L — SIGNIFICANT CHANGE UP (ref 135–145)
SODIUM SERPL-SCNC: 142 MMOL/L — SIGNIFICANT CHANGE UP (ref 135–145)
TROPONIN T, HIGH SENSITIVITY RESULT: 247 NG/L — HIGH (ref 0–51)
WBC # BLD: 8.17 K/UL — SIGNIFICANT CHANGE UP (ref 3.8–10.5)
WBC # BLD: 9 K/UL — SIGNIFICANT CHANGE UP (ref 3.8–10.5)
WBC # FLD AUTO: 8.17 K/UL — SIGNIFICANT CHANGE UP (ref 3.8–10.5)
WBC # FLD AUTO: 9 K/UL — SIGNIFICANT CHANGE UP (ref 3.8–10.5)

## 2019-07-27 PROCEDURE — 93010 ELECTROCARDIOGRAM REPORT: CPT

## 2019-07-27 RX ORDER — POTASSIUM CHLORIDE 20 MEQ
40 PACKET (EA) ORAL ONCE
Refills: 0 | Status: COMPLETED | OUTPATIENT
Start: 2019-07-27 | End: 2019-07-27

## 2019-07-27 RX ORDER — INSULIN GLARGINE 100 [IU]/ML
50 INJECTION, SOLUTION SUBCUTANEOUS AT BEDTIME
Refills: 0 | Status: DISCONTINUED | OUTPATIENT
Start: 2019-07-27 | End: 2019-07-30

## 2019-07-27 RX ORDER — HYDROMORPHONE HYDROCHLORIDE 2 MG/ML
0.25 INJECTION INTRAMUSCULAR; INTRAVENOUS; SUBCUTANEOUS ONCE
Refills: 0 | Status: DISCONTINUED | OUTPATIENT
Start: 2019-07-27 | End: 2019-07-27

## 2019-07-27 RX ORDER — METOPROLOL TARTRATE 50 MG
5 TABLET ORAL ONCE
Refills: 0 | Status: COMPLETED | OUTPATIENT
Start: 2019-07-27 | End: 2019-07-27

## 2019-07-27 RX ADMIN — Medication 81 MILLIGRAM(S): at 12:26

## 2019-07-27 RX ADMIN — LIDOCAINE 1 PATCH: 4 CREAM TOPICAL at 11:00

## 2019-07-27 RX ADMIN — Medication 3 MILLIGRAM(S): at 21:35

## 2019-07-27 RX ADMIN — Medication 650 MILLIGRAM(S): at 09:30

## 2019-07-27 RX ADMIN — Medication 650 MILLIGRAM(S): at 22:37

## 2019-07-27 RX ADMIN — PANTOPRAZOLE SODIUM 40 MILLIGRAM(S): 20 TABLET, DELAYED RELEASE ORAL at 06:21

## 2019-07-27 RX ADMIN — Medication 0.12 MILLIGRAM(S): at 06:21

## 2019-07-27 RX ADMIN — Medication 26 UNIT(S): at 12:24

## 2019-07-27 RX ADMIN — Medication 650 MILLIGRAM(S): at 08:30

## 2019-07-27 RX ADMIN — Medication 26 UNIT(S): at 16:59

## 2019-07-27 RX ADMIN — Medication 50 MICROGRAM(S): at 06:21

## 2019-07-27 RX ADMIN — CHLORHEXIDINE GLUCONATE 1 APPLICATION(S): 213 SOLUTION TOPICAL at 21:36

## 2019-07-27 RX ADMIN — INSULIN GLARGINE 50 UNIT(S): 100 INJECTION, SOLUTION SUBCUTANEOUS at 21:35

## 2019-07-27 RX ADMIN — ISOSORBIDE DINITRATE 30 MILLIGRAM(S): 5 TABLET ORAL at 06:21

## 2019-07-27 RX ADMIN — SENNA PLUS 2 TABLET(S): 8.6 TABLET ORAL at 21:35

## 2019-07-27 RX ADMIN — ISOSORBIDE DINITRATE 30 MILLIGRAM(S): 5 TABLET ORAL at 14:33

## 2019-07-27 RX ADMIN — Medication 60 MILLIGRAM(S): at 18:12

## 2019-07-27 RX ADMIN — Medication 650 MILLIGRAM(S): at 21:39

## 2019-07-27 RX ADMIN — Medication 1: at 08:57

## 2019-07-27 RX ADMIN — Medication 60 MILLIGRAM(S): at 06:22

## 2019-07-27 RX ADMIN — Medication 50 MILLIGRAM(S): at 14:33

## 2019-07-27 RX ADMIN — ATORVASTATIN CALCIUM 40 MILLIGRAM(S): 80 TABLET, FILM COATED ORAL at 21:35

## 2019-07-27 RX ADMIN — Medication 40 MILLIEQUIVALENT(S): at 12:31

## 2019-07-27 RX ADMIN — LIDOCAINE 1 PATCH: 4 CREAM TOPICAL at 08:56

## 2019-07-27 RX ADMIN — ISOSORBIDE DINITRATE 30 MILLIGRAM(S): 5 TABLET ORAL at 21:36

## 2019-07-27 RX ADMIN — CLOPIDOGREL BISULFATE 75 MILLIGRAM(S): 75 TABLET, FILM COATED ORAL at 12:26

## 2019-07-27 RX ADMIN — LIDOCAINE 1 PATCH: 4 CREAM TOPICAL at 21:37

## 2019-07-27 RX ADMIN — Medication 50 MILLIGRAM(S): at 21:35

## 2019-07-27 RX ADMIN — MONTELUKAST 10 MILLIGRAM(S): 4 TABLET, CHEWABLE ORAL at 12:26

## 2019-07-27 RX ADMIN — Medication 650 MILLIGRAM(S): at 00:10

## 2019-07-27 RX ADMIN — Medication 50 MILLIGRAM(S): at 06:21

## 2019-07-27 RX ADMIN — Medication 5 MILLIGRAM(S): at 08:30

## 2019-07-27 RX ADMIN — Medication 26 UNIT(S): at 08:57

## 2019-07-27 NOTE — RAPID RESPONSE TEAM SUMMARY - NSSITUATIONBACKGROUNDRRT_GEN_ALL_CORE
72 yo woman with PMHx of HTN, T2DM, for 20 years  CAD s/p CABG (LIMA to LAD, SVG to OM, SVG to PDA 2014 at [facility]), non-dilated ICM (EF 20-25%), severe mitral regurgitation s/p mitral clip (6/13/19 Dr. Newman), severe pulm HTN, CKD, hypothyroidism admitted for ADHF. RRT called for SVT on EKG with HR in 140s and BP in 106/80, afebrile, SaO2 98% on 2L NC. Per primary nurse patient has neck OA leading to episodes of acute pain that causes exacerbation of SVTs. Patient was given IV lopressor 5 mg x1 and HR came down to 70s. BPs stable. She was also given 0.25 IV Dilaudid and tylenol PO for pain control.

## 2019-07-27 NOTE — PROGRESS NOTE ADULT - SUBJECTIVE AND OBJECTIVE BOX
[facility]   MD RAHEEL SHAH MD ANGELA WONG, PA    TEL:  OFFICE: [phone]  DR SANTOYO CELL: [phone]  DR. NGUYEN CELL: [phone]  UMM KENDALL CELL: [phone]        Patient is a 73y old  Female who presents with a chief complaint of Hypoxia, SOB (27 Jul 2019 10:51)      Patient seen and examined at bedside. no sob, c/o chest pain    VITALS:  T(F): 98.4 (07-27-19 @ 14:16), Max: 98.6 (07-27-19 @ 08:24)  HR: 69 (07-27-19 @ 14:16)  BP: 101/56 (07-27-19 @ 14:16)  RR: 19 (07-27-19 @ 14:16)  SpO2: 97% (07-27-19 @ 14:16)  Wt(kg): --    07-26 @ 07:01  -  07-27 @ 07:00  --------------------------------------------------------  IN: 1005 mL / OUT: 1375 mL / NET: -370 mL    07-27 @ 07:01  -  07-27 @ 16:12  --------------------------------------------------------  IN: 360 mL / OUT: 0 mL / NET: 360 mL          PHYSICAL EXAM:  Constitutional: NAD  Neck: No JVD  Respiratory: CTAB, no wheezes, rales or rhonchi  Cardiovascular: S1, S2, RRR  Gastrointestinal: BS+, soft, NT/ND  Extremities: No peripheral edema    Hospital Medications:   MEDICATIONS  (STANDING):  acetaminophen  IVPB .. 1000 milliGRAM(s) IV Intermittent once  aspirin enteric coated 81 milliGRAM(s) Oral daily  atorvastatin 40 milliGRAM(s) Oral at bedtime  chlorhexidine 2% Cloths 1 Application(s) Topical daily  clopidogrel Tablet 75 milliGRAM(s) Oral daily  dextrose 5%. 1000 milliLiter(s) (50 mL/Hr) IV Continuous <Continuous>  dextrose 50% Injectable 12.5 Gram(s) IV Push once  dextrose 50% Injectable 25 Gram(s) IV Push once  dextrose 50% Injectable 25 Gram(s) IV Push once  digoxin     Tablet 0.125 milliGRAM(s) Oral daily  docusate sodium 100 milliGRAM(s) Oral two times a day  furosemide   Injectable 60 milliGRAM(s) IV Push two times a day  hydrALAZINE 50 milliGRAM(s) Oral every 8 hours  insulin glargine Injectable (LANTUS) 46 Unit(s) SubCutaneous at bedtime  insulin lispro (HumaLOG) corrective regimen sliding scale   SubCutaneous three times a day before meals  insulin lispro (HumaLOG) corrective regimen sliding scale   SubCutaneous at bedtime  insulin lispro Injectable (HumaLOG) 26 Unit(s) SubCutaneous three times a day before meals  isosorbide   dinitrate Tablet (ISORDIL) 30 milliGRAM(s) Oral three times a day  levothyroxine 50 MICROGram(s) Oral daily  lidocaine   Patch 1 Patch Transdermal daily  melatonin 3 milliGRAM(s) Oral at bedtime  montelukast 10 milliGRAM(s) Oral daily  pantoprazole    Tablet 40 milliGRAM(s) Oral before breakfast  polyethylene glycol 3350 17 Gram(s) Oral daily  senna 2 Tablet(s) Oral at bedtime      LABS:  07-27    141  |  100  |  46<H>  ----------------------------<  170<H>  3.7   |  22  |  1.63<H>    Ca    10.2      27 Jul 2019 08:44  Phos  3.8     07-27  Mg     2.0     07-27    TPro  8.5<H>  /  Alb  4.1  /  TBili  0.3  /  DBili      /  AST  44<H>  /  ALT  37  /  AlkPhos  126<H>  07-27    Creatinine Trend: 1.63 <--, 1.62 <--, 1.68 <--, 1.58 <--, 1.48 <--, 1.53 <--, 1.63 <--, 1.67 <--, <0.30 <--    Albumin, Serum: 4.1 g/dL (07-27 @ 08:44)  Phosphorus Level, Serum: 3.8 mg/dL (07-27 @ 08:44)                              8.8    8.17  )-----------( 356      ( 27 Jul 2019 09:06 )             29.2     Urine Studies:  Urinalysis - [07-09-19 @ 13:28]      Color Yellow / Appearance Clear / SG 1.012 / pH 6.0      Gluc Negative / Ketone Negative  / Bili Negative / Urobili Negative       Blood Trace / Protein Trace / Leuk Est Negative / Nitrite Negative      RBC 1 / WBC 1 / Hyaline 0 / Gran  / Sq Epi  / Non Sq Epi 0 / Bacteria Moderate      Iron 42, TIBC 348, %sat 12      [07-05-19 @ 22:32]  Ferritin 130      [07-05-19 @ 22:32]  .4 (Ca --)      [04-25-19 @ 05:45]   --  PTH 43.55 (Ca --)      [09-10-18 @ 07:15]   --  PTH 36.60 (Ca --)      [09-08-18 @ 03:15]   --  Vitamin D (25OH) 25.9      [05-01-19 @ 04:00]  HbA1c 7.4      [07-05-19 @ 22:32]  TSH 2.00      [07-05-19 @ 22:32]  Lipid: chol 148, , HDL 35,       [06-01-19 @ 08:00]        RADIOLOGY & ADDITIONAL STUDIES:

## 2019-07-27 NOTE — PROGRESS NOTE ADULT - ASSESSMENT
Pt is a 74 yo woman with PMHx of HTN, T2DM, CAD s/p CABG (LIMA to LAD, SVG to OM, SVG to PDA 2014 at [facility]), non-dilated ICM (EF 20-25%), severe mitral regurgitation s/p mitral clip (6/13/19 Dr. Newman), severe pulm HTN, CKD, hypothyroidism, who is presenting to ED after experiencing worsening SOB      A/P:      MERVIN  Likely sec to cardiogenic shock  Renal function stable  PT non -oliguric  continue lasix per cardiology  Optimize glucose control  Monitor renal function   Avoid further nephrotoxics, NSAIDS RCA    CKD stage 4:  baseline Scr 1.8-2.0  Renal function fluctuates sec to CHF  Monitor renal function at present    SOB:  in setting of HF  Continue diuretics per cardiology    Acidosis   sec to RF  Improved  mOnitor serum Co2 at present

## 2019-07-27 NOTE — CHART NOTE - NSCHARTNOTEFT_GEN_A_CORE
7/27	RRT was called for abnormal heart rate/SVT--150s--160s around 8.30 am, EKG was abnormal, HST -- 247 (she had h/o high level-->600), she received iv lopressor 5 mg x 1, iv dilaudid 0.25 mg x 1, Acetaminophen then it broke. Attending was informed and house card consult was called, HF Team is following her, now stable.   Sarah Ellington (PA) [employer] # [id number]

## 2019-07-27 NOTE — PROGRESS NOTE ADULT - SUBJECTIVE AND OBJECTIVE BOX
Endocrinology Attending Covering for Dr. Banks      Chief complaint  Patient is a 73y old  Female who presents with a chief complaint of Hypoxia, SOB (26 Jul 2019 19:42)   Review of systems  Patient in bed, looks comfortable, no fever,  had no hypoglycemia.    Labs and Fingersticks  CAPILLARY BLOOD GLUCOSE      POCT Blood Glucose.: 175 mg/dL (27 Jul 2019 08:53)  POCT Blood Glucose.: 172 mg/dL (27 Jul 2019 07:57)  POCT Blood Glucose.: 208 mg/dL (26 Jul 2019 22:30)  POCT Blood Glucose.: 180 mg/dL (26 Jul 2019 21:10)  POCT Blood Glucose.: 155 mg/dL (26 Jul 2019 16:43)  POCT Blood Glucose.: 332 mg/dL (26 Jul 2019 11:43)      Anion Gap, Serum: 19 <H> (07-27 @ 08:44)  Anion Gap, Serum: 16 (07-27 @ 06:49)  Anion Gap, Serum: 15 (07-26 @ 06:12)      Calcium, Total Serum: 10.2 (07-27 @ 08:44)  Calcium, Total Serum: 9.6 (07-27 @ 06:49)  Calcium, Total Serum: 9.9 (07-26 @ 06:12)  Albumin, Serum: 4.1 (07-27 @ 08:44)    Alanine Aminotransferase (ALT/SGPT): 37 (07-27 @ 08:44)  Alkaline Phosphatase, Serum: 126 <H> (07-27 @ 08:44)  Aspartate Aminotransferase (AST/SGOT): 44 <H> (07-27 @ 08:44)        07-27    141  |  100  |  46<H>  ----------------------------<  170<H>  3.7   |  22  |  1.63<H>    Ca    10.2      27 Jul 2019 08:44  Phos  3.8     07-27  Mg     2.0     07-27    TPro  8.5<H>  /  Alb  4.1  /  TBili  0.3  /  DBili  x   /  AST  44<H>  /  ALT  37  /  AlkPhos  126<H>  07-27                        8.8    8.17  )-----------( 356      ( 27 Jul 2019 09:06 )             29.2     Medications  MEDICATIONS  (STANDING):  acetaminophen  IVPB .. 1000 milliGRAM(s) IV Intermittent once  aspirin enteric coated 81 milliGRAM(s) Oral daily  atorvastatin 40 milliGRAM(s) Oral at bedtime  chlorhexidine 2% Cloths 1 Application(s) Topical daily  clopidogrel Tablet 75 milliGRAM(s) Oral daily  dextrose 5%. 1000 milliLiter(s) (50 mL/Hr) IV Continuous <Continuous>  dextrose 50% Injectable 12.5 Gram(s) IV Push once  dextrose 50% Injectable 25 Gram(s) IV Push once  dextrose 50% Injectable 25 Gram(s) IV Push once  digoxin     Tablet 0.125 milliGRAM(s) Oral daily  docusate sodium 100 milliGRAM(s) Oral two times a day  furosemide   Injectable 60 milliGRAM(s) IV Push two times a day  hydrALAZINE 50 milliGRAM(s) Oral every 8 hours  insulin glargine Injectable (LANTUS) 46 Unit(s) SubCutaneous at bedtime  insulin lispro (HumaLOG) corrective regimen sliding scale   SubCutaneous three times a day before meals  insulin lispro (HumaLOG) corrective regimen sliding scale   SubCutaneous at bedtime  insulin lispro Injectable (HumaLOG) 26 Unit(s) SubCutaneous three times a day before meals  isosorbide   dinitrate Tablet (ISORDIL) 30 milliGRAM(s) Oral three times a day  levothyroxine 50 MICROGram(s) Oral daily  lidocaine   Patch 1 Patch Transdermal daily  melatonin 3 milliGRAM(s) Oral at bedtime  montelukast 10 milliGRAM(s) Oral daily  pantoprazole    Tablet 40 milliGRAM(s) Oral before breakfast  polyethylene glycol 3350 17 Gram(s) Oral daily  potassium chloride    Tablet ER 40 milliEquivalent(s) Oral once  senna 2 Tablet(s) Oral at bedtime      Physical Exam  General: Patient comfortable in bed  Vital Signs Last 12 Hrs  T(F): 98.6 (07-27-19 @ 08:24), Max: 98.6 (07-27-19 @ 08:24)  HR: 67 (07-27-19 @ 08:45) (65 - 142)  BP: 96/57 (07-27-19 @ 08:45) (96/57 - 129/63)  BP(mean): --  RR: 24 (07-27-19 @ 08:45) (18 - 24)  SpO2: 100% (07-27-19 @ 08:45) (100% - 100%)  Neck: No palpable thyroid nodules.  CVS: S1S2, No murmurs  Respiratory: No wheezing, no crepitations  GI: Abdomen soft, bowel sounds positive  Musculoskeletal:  edema lower extremities.   Skin: No skin rashes, no ecchymosis    Diagnostics

## 2019-07-27 NOTE — PROVIDER CONTACT NOTE (CRITICAL VALUE NOTIFICATION) - SITUATION
RRT called for sustained -160- pt complaining of chest pain, neck pain, SOB. Labs drawn- troponin 247

## 2019-07-27 NOTE — PROGRESS NOTE ADULT - ASSESSMENT
Assessment  DMT2: 73y Female with DM T2 with hyperglycemia on insulin, blood sugars improving but still not at goal ,   eating meals,  non compliant with low carb diet.  CAD: S/P cabgOn medications, stable, monitored.  Hypothyroidism:  On synthroid 50  mcg po daily, asymptomatic.  HTN: Controlled, On med.  HLD; on statin  CKD: Monitor labs/BMP,   ESRD: On hemodialysis, Monitor labs/BMP      Plan:   DMT2:  Change Lantus to 50 units qhs, Continue  Humalog to 26  units before each meal in addition to correction scale coverage, monitor FS will FU  Patient counseled for compliance with consistant low carb dietCAD: Continue treatment, monitoring FU Cardiothocracic team.  CAD: S/P cabg On medications, stable, monitored.  Hypothyroidism:  On synthroid 50  mcg po daily, asymptomatic. F/U tsh as outpatient  HTN: Continue treatment, monitoring, Primary team FU  HLD; on statin  CKD: Continue monitoring labs, renal FU  Thank you for consult will F/U  case discussed with NP  erika Woodard MD  [phone]

## 2019-07-28 LAB
ANION GAP SERPL CALC-SCNC: 17 MMOL/L — SIGNIFICANT CHANGE UP (ref 5–17)
BUN SERPL-MCNC: 52 MG/DL — HIGH (ref 7–23)
CALCIUM SERPL-MCNC: 9.5 MG/DL — SIGNIFICANT CHANGE UP (ref 8.4–10.5)
CHLORIDE SERPL-SCNC: 100 MMOL/L — SIGNIFICANT CHANGE UP (ref 96–108)
CO2 SERPL-SCNC: 21 MMOL/L — LOW (ref 22–31)
CREAT SERPL-MCNC: 1.62 MG/DL — HIGH (ref 0.5–1.3)
GLUCOSE BLDC GLUCOMTR-MCNC: 160 MG/DL — HIGH (ref 70–99)
GLUCOSE BLDC GLUCOMTR-MCNC: 188 MG/DL — HIGH (ref 70–99)
GLUCOSE BLDC GLUCOMTR-MCNC: 191 MG/DL — HIGH (ref 70–99)
GLUCOSE BLDC GLUCOMTR-MCNC: 215 MG/DL — HIGH (ref 70–99)
GLUCOSE SERPL-MCNC: 163 MG/DL — HIGH (ref 70–99)
HCT VFR BLD CALC: 28.8 % — LOW (ref 34.5–45)
HGB BLD-MCNC: 10 G/DL — LOW (ref 11.5–15.5)
MAGNESIUM SERPL-MCNC: 2 MG/DL — SIGNIFICANT CHANGE UP (ref 1.6–2.6)
MCHC RBC-ENTMCNC: 30.3 PG — SIGNIFICANT CHANGE UP (ref 27–34)
MCHC RBC-ENTMCNC: 34.7 GM/DL — SIGNIFICANT CHANGE UP (ref 32–36)
MCV RBC AUTO: 87.2 FL — SIGNIFICANT CHANGE UP (ref 80–100)
PLATELET # BLD AUTO: 361 K/UL — SIGNIFICANT CHANGE UP (ref 150–400)
POTASSIUM SERPL-MCNC: 3.9 MMOL/L — SIGNIFICANT CHANGE UP (ref 3.5–5.3)
POTASSIUM SERPL-SCNC: 3.9 MMOL/L — SIGNIFICANT CHANGE UP (ref 3.5–5.3)
RBC # BLD: 3.3 M/UL — LOW (ref 3.8–5.2)
RBC # FLD: 16.5 % — HIGH (ref 10.3–14.5)
SODIUM SERPL-SCNC: 138 MMOL/L — SIGNIFICANT CHANGE UP (ref 135–145)
WBC # BLD: 8.5 K/UL — SIGNIFICANT CHANGE UP (ref 3.8–10.5)
WBC # FLD AUTO: 8.5 K/UL — SIGNIFICANT CHANGE UP (ref 3.8–10.5)

## 2019-07-28 PROCEDURE — 99233 SBSQ HOSP IP/OBS HIGH 50: CPT | Mod: GC

## 2019-07-28 RX ORDER — METOPROLOL TARTRATE 50 MG
12.5 TABLET ORAL
Refills: 0 | Status: DISCONTINUED | OUTPATIENT
Start: 2019-07-28 | End: 2019-08-03

## 2019-07-28 RX ORDER — POTASSIUM CHLORIDE 20 MEQ
40 PACKET (EA) ORAL ONCE
Refills: 0 | Status: COMPLETED | OUTPATIENT
Start: 2019-07-28 | End: 2019-07-28

## 2019-07-28 RX ADMIN — Medication 50 MILLIGRAM(S): at 14:11

## 2019-07-28 RX ADMIN — ISOSORBIDE DINITRATE 30 MILLIGRAM(S): 5 TABLET ORAL at 05:24

## 2019-07-28 RX ADMIN — ISOSORBIDE DINITRATE 30 MILLIGRAM(S): 5 TABLET ORAL at 14:11

## 2019-07-28 RX ADMIN — Medication 26 UNIT(S): at 16:59

## 2019-07-28 RX ADMIN — Medication 26 UNIT(S): at 08:08

## 2019-07-28 RX ADMIN — Medication 40 MILLIEQUIVALENT(S): at 06:22

## 2019-07-28 RX ADMIN — Medication 50 MILLIGRAM(S): at 05:24

## 2019-07-28 RX ADMIN — Medication 50 MICROGRAM(S): at 05:24

## 2019-07-28 RX ADMIN — LIDOCAINE 1 PATCH: 4 CREAM TOPICAL at 21:20

## 2019-07-28 RX ADMIN — PANTOPRAZOLE SODIUM 40 MILLIGRAM(S): 20 TABLET, DELAYED RELEASE ORAL at 05:24

## 2019-07-28 RX ADMIN — Medication 26 UNIT(S): at 12:18

## 2019-07-28 RX ADMIN — Medication 1: at 08:08

## 2019-07-28 RX ADMIN — CLOPIDOGREL BISULFATE 75 MILLIGRAM(S): 75 TABLET, FILM COATED ORAL at 12:21

## 2019-07-28 RX ADMIN — Medication 60 MILLIGRAM(S): at 18:21

## 2019-07-28 RX ADMIN — LIDOCAINE 1 PATCH: 4 CREAM TOPICAL at 08:22

## 2019-07-28 RX ADMIN — LIDOCAINE 1 PATCH: 4 CREAM TOPICAL at 09:00

## 2019-07-28 RX ADMIN — ISOSORBIDE DINITRATE 30 MILLIGRAM(S): 5 TABLET ORAL at 21:21

## 2019-07-28 RX ADMIN — Medication 1: at 12:18

## 2019-07-28 RX ADMIN — Medication 12.5 MILLIGRAM(S): at 18:34

## 2019-07-28 RX ADMIN — Medication 81 MILLIGRAM(S): at 12:21

## 2019-07-28 RX ADMIN — Medication 3 MILLIGRAM(S): at 21:21

## 2019-07-28 RX ADMIN — Medication 0.12 MILLIGRAM(S): at 05:23

## 2019-07-28 RX ADMIN — Medication 50 MILLIGRAM(S): at 21:21

## 2019-07-28 RX ADMIN — Medication 1: at 16:59

## 2019-07-28 RX ADMIN — Medication 60 MILLIGRAM(S): at 05:23

## 2019-07-28 RX ADMIN — MONTELUKAST 10 MILLIGRAM(S): 4 TABLET, CHEWABLE ORAL at 12:21

## 2019-07-28 RX ADMIN — ATORVASTATIN CALCIUM 40 MILLIGRAM(S): 80 TABLET, FILM COATED ORAL at 21:21

## 2019-07-28 RX ADMIN — INSULIN GLARGINE 50 UNIT(S): 100 INJECTION, SOLUTION SUBCUTANEOUS at 21:20

## 2019-07-28 RX ADMIN — Medication 100 MILLIGRAM(S): at 18:21

## 2019-07-28 RX ADMIN — Medication 100 MILLIGRAM(S): at 05:23

## 2019-07-28 NOTE — PROGRESS NOTE ADULT - SUBJECTIVE AND OBJECTIVE BOX
Endocrinology Attending Covering for Dr. Banks      Chief complaint  Patient is a 73y old  Female who presents with a chief complaint of Hypoxia, SOB (28 Jul 2019 01:15)   Review of systems  Patient in bed, looks comfortable, no fever,  had no hypoglycemia.    Labs and Fingersticks  CAPILLARY BLOOD GLUCOSE      POCT Blood Glucose.: 188 mg/dL (28 Jul 2019 07:44)  POCT Blood Glucose.: 165 mg/dL (27 Jul 2019 21:21)  POCT Blood Glucose.: 145 mg/dL (27 Jul 2019 19:26)  POCT Blood Glucose.: 91 mg/dL (27 Jul 2019 16:42)  POCT Blood Glucose.: 98 mg/dL (27 Jul 2019 12:14)      Anion Gap, Serum: 17 (07-28 @ 05:42)  Anion Gap, Serum: 19 <H> (07-27 @ 08:44)  Anion Gap, Serum: 16 (07-27 @ 06:49)      Calcium, Total Serum: 9.5 (07-28 @ 05:42)  Calcium, Total Serum: 10.2 (07-27 @ 08:44)  Calcium, Total Serum: 9.6 (07-27 @ 06:49)  Albumin, Serum: 4.1 (07-27 @ 08:44)    Alanine Aminotransferase (ALT/SGPT): 37 (07-27 @ 08:44)  Alkaline Phosphatase, Serum: 126 <H> (07-27 @ 08:44)  Aspartate Aminotransferase (AST/SGOT): 44 <H> (07-27 @ 08:44)        07-28    138  |  100  |  52<H>  ----------------------------<  163<H>  3.9   |  21<L>  |  1.62<H>    Ca    9.5      28 Jul 2019 05:42  Phos  3.8     07-27  Mg     2.0     07-28    TPro  8.5<H>  /  Alb  4.1  /  TBili  0.3  /  DBili  x   /  AST  44<H>  /  ALT  37  /  AlkPhos  126<H>  07-27                        10.0   8.5   )-----------( 361      ( 28 Jul 2019 05:42 )             28.8     Medications  MEDICATIONS  (STANDING):  acetaminophen  IVPB .. 1000 milliGRAM(s) IV Intermittent once  aspirin enteric coated 81 milliGRAM(s) Oral daily  atorvastatin 40 milliGRAM(s) Oral at bedtime  chlorhexidine 2% Cloths 1 Application(s) Topical daily  clopidogrel Tablet 75 milliGRAM(s) Oral daily  dextrose 5%. 1000 milliLiter(s) (50 mL/Hr) IV Continuous <Continuous>  dextrose 50% Injectable 12.5 Gram(s) IV Push once  dextrose 50% Injectable 25 Gram(s) IV Push once  dextrose 50% Injectable 25 Gram(s) IV Push once  digoxin     Tablet 0.125 milliGRAM(s) Oral daily  docusate sodium 100 milliGRAM(s) Oral two times a day  furosemide   Injectable 60 milliGRAM(s) IV Push two times a day  hydrALAZINE 50 milliGRAM(s) Oral every 8 hours  insulin glargine Injectable (LANTUS) 50 Unit(s) SubCutaneous at bedtime  insulin lispro (HumaLOG) corrective regimen sliding scale   SubCutaneous three times a day before meals  insulin lispro (HumaLOG) corrective regimen sliding scale   SubCutaneous at bedtime  insulin lispro Injectable (HumaLOG) 26 Unit(s) SubCutaneous three times a day before meals  isosorbide   dinitrate Tablet (ISORDIL) 30 milliGRAM(s) Oral three times a day  levothyroxine 50 MICROGram(s) Oral daily  lidocaine   Patch 1 Patch Transdermal daily  melatonin 3 milliGRAM(s) Oral at bedtime  montelukast 10 milliGRAM(s) Oral daily  pantoprazole    Tablet 40 milliGRAM(s) Oral before breakfast  polyethylene glycol 3350 17 Gram(s) Oral daily  senna 2 Tablet(s) Oral at bedtime      Physical Exam  General: Patient comfortable in bed  Vital Signs Last 12 Hrs  T(F): 97.9 (07-28-19 @ 05:13), Max: 98.1 (07-28-19 @ 04:08)  HR: 70 (07-28-19 @ 05:13) (70 - 70)  BP: 123/65 (07-28-19 @ 05:13) (109/65 - 123/65)  BP(mean): --  RR: 18 (07-28-19 @ 05:13) (18 - 18)  SpO2: 100% (07-28-19 @ 05:13) (100% - 100%)  Neck: No palpable thyroid nodules.  CVS: S1S2, No murmurs  Respiratory: No wheezing, no crepitations  GI: Abdomen soft, bowel sounds positive  Musculoskeletal:  edema lower extremities.   Skin: No skin rashes, no ecchymosis    Diagnostics

## 2019-07-28 NOTE — PROGRESS NOTE ADULT - SUBJECTIVE AND OBJECTIVE BOX
CHIEF COMPLAINT: patient laying in the bed awake to verbal complain of fatigue event of a  RRT noted  Endo follow-up and renal follow-up appreciated cardiology consult appreciated patient started on Lopressor 12.5 mg twice daily  SUBJECTIVE:     REVIEW OF SYSTEMS:    CONSTITUTIONAL: ( x )  weakness,  (  ) fevers or chills  EYES/ENT: (  )visual changes;     NECK: (  ) pain or stiffness  RESPIRATORY:   (  )cough, wheezing, hemoptysis;  ( x ) shortness of breath  CARDIOVASCULAR:  (  )chest pain or palpitations  GASTROINTESTINAL:   (  )abdominal or epigastric pain.  (  ) nausea, vomiting, or hematemesis;   (   ) diarrhea or constipation.   GENITOURINARY:   (    ) dysuria, frequency or hematuria  NEUROLOGICAL:  (   ) numbness or weakness   All other review of systems is negative unless indicated above    Vital Signs Last 24 Hrs  T(C): 36.7 (28 Jul 2019 20:10), Max: 37 (28 Jul 2019 12:00)  T(F): 98.1 (28 Jul 2019 20:10), Max: 98.6 (28 Jul 2019 12:00)  HR: 67 (28 Jul 2019 21:19) (67 - 81)  BP: 116/67 (28 Jul 2019 21:19) (100/56 - 123/65)  BP(mean): --  RR: 18 (28 Jul 2019 20:10) (18 - 20)  SpO2: 99% (28 Jul 2019 20:10) (98% - 100%)    I&O's Summary    27 Jul 2019 07:01  -  28 Jul 2019 07:00  --------------------------------------------------------  IN: 720 mL / OUT: 275 mL / NET: 445 mL    28 Jul 2019 07:01  -  28 Jul 2019 22:04  --------------------------------------------------------  IN: 0 mL / OUT: 100 mL / NET: -100 mL        CAPILLARY BLOOD GLUCOSE      POCT Blood Glucose.: 215 mg/dL (28 Jul 2019 21:16)  POCT Blood Glucose.: 160 mg/dL (28 Jul 2019 16:39)  POCT Blood Glucose.: 191 mg/dL (28 Jul 2019 12:01)  POCT Blood Glucose.: 188 mg/dL (28 Jul 2019 07:44)      PHYSICAL EXAM:  Constitutional:  (  x ) NAD,   (   )awake and alert  HEENT: PERR, EOMI,    Neck: Soft and supple, No LAD, No JVD  Respiratory:  (   x Breath sounds are clear bilaterally,    (   ) wheezing, rales or rhonchi  Cardiovascular:     ( x  )S1 and S2, regular rate and rhythm, no Murmurs, gallops or rubs  Gastrointestinal:  (  x )Bowel Sounds present, soft,   (  )nontender, nondistended,    Extremities:    (  ) peripheral edema  Vascular: 2+ peripheral pulses  Neurological:    ( x   )A/O ,   (  ) focal deficits  Musculoskeletal:    (   )  normal strength b/l upper  (     ) normal  lower extremities  Skin: No rashes       MEDICATIONS:  MEDICATIONS  (STANDING):  acetaminophen  IVPB .. 1000 milliGRAM(s) IV Intermittent once  aspirin enteric coated 81 milliGRAM(s) Oral daily  atorvastatin 40 milliGRAM(s) Oral at bedtime  chlorhexidine 2% Cloths 1 Application(s) Topical daily  clopidogrel Tablet 75 milliGRAM(s) Oral daily  dextrose 5%. 1000 milliLiter(s) (50 mL/Hr) IV Continuous <Continuous>  dextrose 50% Injectable 12.5 Gram(s) IV Push once  dextrose 50% Injectable 25 Gram(s) IV Push once  dextrose 50% Injectable 25 Gram(s) IV Push once  digoxin     Tablet 0.125 milliGRAM(s) Oral daily  docusate sodium 100 milliGRAM(s) Oral two times a day  furosemide   Injectable 60 milliGRAM(s) IV Push two times a day  hydrALAZINE 50 milliGRAM(s) Oral every 8 hours  insulin glargine Injectable (LANTUS) 50 Unit(s) SubCutaneous at bedtime  insulin lispro (HumaLOG) corrective regimen sliding scale   SubCutaneous three times a day before meals  insulin lispro (HumaLOG) corrective regimen sliding scale   SubCutaneous at bedtime  insulin lispro Injectable (HumaLOG) 26 Unit(s) SubCutaneous three times a day before meals  isosorbide   dinitrate Tablet (ISORDIL) 30 milliGRAM(s) Oral three times a day  levothyroxine 50 MICROGram(s) Oral daily  lidocaine   Patch 1 Patch Transdermal daily  melatonin 3 milliGRAM(s) Oral at bedtime  metoprolol tartrate 12.5 milliGRAM(s) Oral two times a day  montelukast 10 milliGRAM(s) Oral daily  pantoprazole    Tablet 40 milliGRAM(s) Oral before breakfast  polyethylene glycol 3350 17 Gram(s) Oral daily  senna 2 Tablet(s) Oral at bedtime      LABS: All Labs Reviewed:                        10.0   8.5   )-----------( 361      ( 28 Jul 2019 05:42 )             28.8     07-28    138  |  100  |  52<H>  ----------------------------<  163<H>  3.9   |  21<L>  |  1.62<H>    Ca    9.5      28 Jul 2019 05:42  Phos  3.8     07-27  Mg     2.0     07-28    TPro  8.5<H>  /  Alb  4.1  /  TBili  0.3  /  DBili  x   /  AST  44<H>  /  ALT  37  /  AlkPhos  126<H>  07-27      CARDIAC MARKERS ( 27 Jul 2019 08:44 )  x     / x     / x     / x     / 3.6 ng/mL      Blood Culture:   Urine Culture      RADIOLOGY/EKG:    ASSESSMENT AND PLAN:  Pt is a 72 yo woman with PMHx of HTN, T2DM (A1c 7.4%), CAD s/p CABG (LIMA to LAD, SVG to OM, SVG to PDA 2014 at [facility]), non-dilated ICM (EF 20-25%), severe mitral regurgitation s/p mitral clip (6/13/19 Dr. Newman), severe pulm HTN, CKD, hypothyroidism, who is presenting to ED after experiencing worsening SOB and intermittent chest pain for 1 week at [facility]. Of note, pt was recently discharged on 6/19 after having mitral clip procedure completed. She initially required BiPAP with improvement in her respiratory status following diuresis. Initiated on vasopressors and inotropes in CCU, which were subsequently weaned off. Infectious work up was negative.    She remains volume overloaded with NYHA Class IIIb symptoms. She is not adequately responding to current diuretic regimen. Her weight is unchanged today despite increase in diuretics. Her Scrt is continuing to downtrend and now close to baseline. She is normotensive tolerating uptitration of vasodilators.     Problem/Plan - 1:  ·  Problem: Acute on chronic systolic heart failure.  Plan: - Please increase ISDN to 20 mg PO TID, hold for SBP < 90  -  on lasix to 60 mg IV BID Patient condition  better will change to po   after heart failure follow-up	  - Continue hydralazine 50mg PO TID, hold for SBP < 90  - No ARB/ARNI/MRA at this time due to renal dysfunction  - Will hold on initiation of beta block until euvolemic  - Daily standing weights, strict I &Os.     Problem/Plan - 2:  ·  Problem: Severe mitral regurgitation.  Plan: - s/p Mitral clip   - TTE on 7/9: mild MR.     Problem/Plan - 3:  ·  Problem:  DM insulin glargine Injectable (LANTUS) 50Unit(s) SubCutaneous at bedtime  Endo follow-up appreciated  insulin lispro (HumaLOG) corrective regimen sliding scale   SubCutaneous three times a day before meals  insulin lispro (HumaLOG) corrective regimen sliding scale   SubCutaneous at bedtime  insulin lispro Injectable (HumaLOG) 26 Unit(s) SubCutaneous three times a day before meals        Problem/Plan - 4:  ·  Problem: SVT (supraventricular tachycardia).  Plan: - Currently SR  - Continue to monitor on tele    -  CAD (coronary artery disease).  Plan: -Continue ASA, clopidogrel.   continue low-dose digoxin 0.125     Problem/Plan - 5:  ·  Problem: Acute renal failure/Anemia Receive  1 unit RBC  DVT PPX:    ADVANCED DIRECTIVE:    DISPOSITION:family wishes to transfer to [facility]   DVT PPX:    ADVANCED DIRECTIVE:    DISPOSITION:

## 2019-07-28 NOTE — PROGRESS NOTE ADULT - SUBJECTIVE AND OBJECTIVE BOX
Dr. Muhammad  Office [phone]  Cell [phone]  [facility]  Cell [phone]      Patient is a 73y old  Female who presents with a chief complaint of Hypoxia, SOB (28 Jul 2019 09:59)      Patient seen and examined at bedside. No chest pain/sob    VITALS:  T(F): 98.6 (07-28-19 @ 12:00), Max: 98.6 (07-28-19 @ 12:00)  HR: 74 (07-28-19 @ 15:00)  BP: 109/58 (07-28-19 @ 15:00)  RR: 20 (07-28-19 @ 15:00)  SpO2: 99% (07-28-19 @ 15:00)  Wt(kg): --    07-27 @ 07:01  -  07-28 @ 07:00  --------------------------------------------------------  IN: 720 mL / OUT: 275 mL / NET: 445 mL          PHYSICAL EXAM:  Constitutional: NAD  Neck: No JVD  Respiratory: CTAB, no wheezes, rales or rhonchi  Cardiovascular: S1, S2, RRR  Gastrointestinal: BS+, soft, NT/ND  Extremities: No peripheral edema    Hospital Medications:   MEDICATIONS  (STANDING):  acetaminophen  IVPB .. 1000 milliGRAM(s) IV Intermittent once  aspirin enteric coated 81 milliGRAM(s) Oral daily  atorvastatin 40 milliGRAM(s) Oral at bedtime  chlorhexidine 2% Cloths 1 Application(s) Topical daily  clopidogrel Tablet 75 milliGRAM(s) Oral daily  dextrose 5%. 1000 milliLiter(s) (50 mL/Hr) IV Continuous <Continuous>  dextrose 50% Injectable 12.5 Gram(s) IV Push once  dextrose 50% Injectable 25 Gram(s) IV Push once  dextrose 50% Injectable 25 Gram(s) IV Push once  digoxin     Tablet 0.125 milliGRAM(s) Oral daily  docusate sodium 100 milliGRAM(s) Oral two times a day  furosemide   Injectable 60 milliGRAM(s) IV Push two times a day  hydrALAZINE 50 milliGRAM(s) Oral every 8 hours  insulin glargine Injectable (LANTUS) 50 Unit(s) SubCutaneous at bedtime  insulin lispro (HumaLOG) corrective regimen sliding scale   SubCutaneous three times a day before meals  insulin lispro (HumaLOG) corrective regimen sliding scale   SubCutaneous at bedtime  insulin lispro Injectable (HumaLOG) 26 Unit(s) SubCutaneous three times a day before meals  isosorbide   dinitrate Tablet (ISORDIL) 30 milliGRAM(s) Oral three times a day  levothyroxine 50 MICROGram(s) Oral daily  lidocaine   Patch 1 Patch Transdermal daily  melatonin 3 milliGRAM(s) Oral at bedtime  metoprolol tartrate 12.5 milliGRAM(s) Oral two times a day  montelukast 10 milliGRAM(s) Oral daily  pantoprazole    Tablet 40 milliGRAM(s) Oral before breakfast  polyethylene glycol 3350 17 Gram(s) Oral daily  senna 2 Tablet(s) Oral at bedtime      LABS:  07-28    138  |  100  |  52<H>  ----------------------------<  163<H>  3.9   |  21<L>  |  1.62<H>    Ca    9.5      28 Jul 2019 05:42  Phos  3.8     07-27  Mg     2.0     07-28    TPro  8.5<H>  /  Alb  4.1  /  TBili  0.3  /  DBili      /  AST  44<H>  /  ALT  37  /  AlkPhos  126<H>  07-27    Creatinine Trend: 1.62 <--, 1.63 <--, 1.62 <--, 1.68 <--, 1.58 <--, 1.48 <--, 1.53 <--, 1.63 <--, 1.67 <--                                10.0   8.5   )-----------( 361      ( 28 Jul 2019 05:42 )             28.8     Urine Studies:  Urinalysis - [07-09-19 @ 13:28]      Color Yellow / Appearance Clear / SG 1.012 / pH 6.0      Gluc Negative / Ketone Negative  / Bili Negative / Urobili Negative       Blood Trace / Protein Trace / Leuk Est Negative / Nitrite Negative      RBC 1 / WBC 1 / Hyaline 0 / Gran  / Sq Epi  / Non Sq Epi 0 / Bacteria Moderate      Iron 42, TIBC 348, %sat 12      [07-05-19 @ 22:32]  Ferritin 130      [07-05-19 @ 22:32]  .4 (Ca --)      [04-25-19 @ 05:45]   --  PTH 43.55 (Ca --)      [09-10-18 @ 07:15]   --  PTH 36.60 (Ca --)      [09-08-18 @ 03:15]   --  Vitamin D (25OH) 25.9      [05-01-19 @ 04:00]  HbA1c 7.4      [07-05-19 @ 22:32]  TSH 2.00      [07-05-19 @ 22:32]  Lipid: chol 148, , HDL 35,       [06-01-19 @ 08:00]        RADIOLOGY & ADDITIONAL STUDIES:

## 2019-07-28 NOTE — PROGRESS NOTE ADULT - SUBJECTIVE AND OBJECTIVE BOX
Patient seen and examined at bedside. Called to evaluate patient given multiple RRTs in recent days for SVT.    Patient resting comfortably without complaints. Episode of SVT this am with 3.8 second pause after converting to sinus.          Current Meds:  acetaminophen   Tablet .. 650 milliGRAM(s) Oral every 6 hours PRN  acetaminophen  IVPB .. 1000 milliGRAM(s) IV Intermittent once  aspirin enteric coated 81 milliGRAM(s) Oral daily  atorvastatin 40 milliGRAM(s) Oral at bedtime  chlorhexidine 2% Cloths 1 Application(s) Topical daily  clopidogrel Tablet 75 milliGRAM(s) Oral daily  dextrose 40% Gel 15 Gram(s) Oral once PRN  dextrose 5%. 1000 milliLiter(s) IV Continuous <Continuous>  dextrose 50% Injectable 12.5 Gram(s) IV Push once  dextrose 50% Injectable 25 Gram(s) IV Push once  dextrose 50% Injectable 25 Gram(s) IV Push once  digoxin     Tablet 0.125 milliGRAM(s) Oral daily  docusate sodium 100 milliGRAM(s) Oral two times a day  furosemide   Injectable 60 milliGRAM(s) IV Push two times a day  glucagon  Injectable 1 milliGRAM(s) IntraMuscular once PRN  hydrALAZINE 50 milliGRAM(s) Oral every 8 hours  insulin glargine Injectable (LANTUS) 50 Unit(s) SubCutaneous at bedtime  insulin lispro (HumaLOG) corrective regimen sliding scale   SubCutaneous three times a day before meals  insulin lispro (HumaLOG) corrective regimen sliding scale   SubCutaneous at bedtime  insulin lispro Injectable (HumaLOG) 26 Unit(s) SubCutaneous three times a day before meals  isosorbide   dinitrate Tablet (ISORDIL) 30 milliGRAM(s) Oral three times a day  levothyroxine 50 MICROGram(s) Oral daily  lidocaine   Patch 1 Patch Transdermal daily  melatonin 3 milliGRAM(s) Oral at bedtime  montelukast 10 milliGRAM(s) Oral daily  pantoprazole    Tablet 40 milliGRAM(s) Oral before breakfast  polyethylene glycol 3350 17 Gram(s) Oral daily  senna 2 Tablet(s) Oral at bedtime      Vitals:  T(F): 98.3 (07-27), Max: 98.6 (07-27)  HR: 72 (07-27) (65 - 142)  BP: 121/57 (07-27) (96/57 - 129/63)  RR: 19 (07-27)  SpO2: 100% (07-27)  I&O's Summary    26 Jul 2019 07:01  -  27 Jul 2019 07:00  --------------------------------------------------------  IN: 1005 mL / OUT: 1375 mL / NET: -370 mL    27 Jul 2019 07:01  -  28 Jul 2019 01:15  --------------------------------------------------------  IN: 480 mL / OUT: 200 mL / NET: 280 mL        Physical Exam:  Appearance: No acute distress; sleeping comfortably  HEENT:  EOMI, sclera anicteric, Normal oral mucosa  Cardiovascular: RRR, S1, S2, no murmurs, rubs, or gallops; no edema; JVD to mandible  Respiratory: Clear to auscultation bilaterally, no wheezes, rales, rhonchi  Gastrointestinal: soft, non-tender, non-distended with normal bowel sounds  Musculoskeletal: No clubbing; no joint deformity   Neurologic: Non-focal  Lymphatic: No lymphadenopathy  Psychiatry: AAOx3, mood & affect appropriate  Skin: No rashes, ecchymoses, or cyanosis                          8.8    8.17  )-----------( 356      ( 27 Jul 2019 09:06 )             29.2     07-27    141  |  100  |  46<H>  ----------------------------<  170<H>  3.7   |  22  |  1.63<H>    Ca    10.2      27 Jul 2019 08:44  Phos  3.8     07-27  Mg     2.0     07-27    TPro  8.5<H>  /  Alb  4.1  /  TBili  0.3  /  DBili  x   /  AST  44<H>  /  ALT  37  /  AlkPhos  126<H>  07-27      CARDIAC MARKERS ( 27 Jul 2019 08:44 )  247 ng/L / x     / x     / x     / x     / 3.6 ng/mL  CARDIAC MARKERS ( 24 Jul 2019 06:39 )  66 ng/L / x     / x     / x     / x     / x      CARDIAC MARKERS ( 24 Jul 2019 03:03 )  68 ng/L / x     / x     / 27 U/L / x     / 2.1 ng/mL  CARDIAC MARKERS ( 23 Jul 2019 22:43 )  69 ng/L / x     / x     / 26 U/L / x     / 2.1 ng/mL      Echo:  7/9/19  Conclusions:  1. Severe global left ventricular systolic dysfunction.  Endocardial visualization enhanced with intravenous  injection of Ultrasonic Enhancing Agent (Definity).  2. The right ventricle is not well visualized; grossly  normal right ventricular systolic function.  3. Estimated right ventricular systolic pressure equals 67  mm Hg, assuming right atrial pressure equals 15 mm Hg,  consistent with severe pulmonary hypertension.  4. Normal tricuspid valve. Mild-moderate tricuspid  regurgitation.      Interpretation of Telemetry: sinus, episode of SVT this am

## 2019-07-28 NOTE — PROGRESS NOTE ADULT - ASSESSMENT
74 yo F with a PMH of ICM(EF 20-25%), severe MR s/p MitraClip, severe pulm HTN, HTN, DM, CAD s/p CABG, CKD, hypothyroidism who presented with SOB and chest pain, found to be volume overloaded, undergoing diuresis. Course complicated by multiple recent RRTs for SVT.    #SVT  - continue digoxin  - would f/u with HF regarding possibility of starting beta blocker despite some volume overload  - monitor on tele  - K > 4, Mg > 2  - continued GOC discussions    Steffen Calle MD  Cardiology Fellow  [phone]  All Cardiology service information can be found 24/7 on [email or web address], password: [email or web address]

## 2019-07-28 NOTE — PROGRESS NOTE ADULT - ASSESSMENT
Assessment  DMT2: 73y Female with DM T2 with hyperglycemia on insulin, blood sugars improving  ,   eating meals,  non compliant with low carb diet.  CAD: S/P cabgOn medications, stable, monitored.  Hypothyroidism:  On synthroid 50  mcg po daily, asymptomatic.  HTN: Controlled, On med.  HLD; on statin  CKD: Monitor labs/BMP,   ESRD: On hemodialysis, Monitor labs/BMP      Plan:   DMT2:  Continue  Lantus to 50 units qhs, Continue  Humalog to 26  units before each meal in addition to correction scale coverage, monitor FS will FU  Patient counseled for compliance with consistant low carb dietCAD: Continue treatment, monitoring FU Cardiothocracic team.  CAD: S/P cabg On medications, stable, monitored.  Hypothyroidism:  On synthroid 50  mcg po daily, asymptomatic. F/U tsh as outpatient  HTN: Continue treatment, monitoring, Primary team FU  HLD; on statin  CKD: Continue monitoring labs, renal FU  Thank you for consult will F/U  case discussed with NP  erika Woodard MD  [phone]

## 2019-07-28 NOTE — PROGRESS NOTE ADULT - ASSESSMENT
Pt is a 74 yo woman with PMHx of HTN, T2DM, CAD s/p CABG (LIMA to LAD, SVG to OM, SVG to PDA 2014 at [facility]), non-dilated ICM (EF 20-25%), severe mitral regurgitation s/p mitral clip (6/13/19 Dr. Newman), severe pulm HTN, CKD, hypothyroidism, who is presenting to ED after experiencing worsening SOB      A/P:      MERVIN  Likely sec to cardiogenic shock  Renal function stable  PT non -oliguric  continue lasix per cardiology  Optimize glucose control  Monitor renal function   Avoid further nephrotoxics, NSAIDS RCA    CKD stage 4:  baseline Scr 1.8-2.0  Renal function fluctuates sec to CHF  Monitor renal function at present    SOB:  in setting of HF  Continue diuretics per cardiology    Acidosis   sec to RF  Improved  mOnitor serum Co2 at present

## 2019-07-28 NOTE — CHART NOTE - NSCHARTNOTEFT_GEN_A_CORE
Patient is a 73y old  Female who presents with a chief complaint of Hypoxia, SOB (28 Jul 2019 01:15)  Informed by RN of noted 26 beats WCT noted on tele; pt was asleep at the time   Pt seen , denied having cp, sob, palpitations    Vital Signs Last 24 Hrs  T(C): 36.6 (28 Jul 2019 05:13), Max: 36.9 (27 Jul 2019 14:16)  T(F): 97.9 (28 Jul 2019 05:13), Max: 98.4 (27 Jul 2019 14:16)  HR: 70 (28 Jul 2019 05:13) (65 - 74)  BP: 123/65 (28 Jul 2019 05:13) (96/57 - 123/65)  BP(mean): --  RR: 18 (28 Jul 2019 05:13) (18 - 24)  SpO2: 100% (28 Jul 2019 05:13) (97% - 100%)      Labs:                          10.0   8.5   )-----------( 361      ( 28 Jul 2019 05:42 )             28.8     07-28    138  |  100  |  52<H>  ----------------------------<  163<H>  3.9   |  21<L>  |  1.62<H>    Ca    9.5      28 Jul 2019 05:42  Phos  3.8     07-27  Mg     2.0     07-28    TPro  8.5<H>  /  Alb  4.1  /  TBili  0.3  /  DBili  x   /  AST  44<H>  /  ALT  37  /  AlkPhos  126<H>  07-27    CARDIAC MARKERS ( 27 Jul 2019 08:44 )  x     / x     / x     / x     / 3.6 ng/mL          Radiology:    Physical Exam:  General: WN/WD NAD  Neurology: A&Ox3, nonfocal, CUADRA x 4  Head:  Normocephalic, atraumatic  Respiratory: CTA B/L  CV: RRR, S1S2, no murmur  Abdominal: Soft, Non tender, non distended, + BS  MSK: No edema, + peripheral pulses, FROM all 4 extremity    Assessment & Plan:  HPI:  Pt is a 72 yo woman with PMHx of HTN, T2DM, CAD s/p CABG (LIMA to LAD, SVG to OM, SVG to PDA 2014 at [facility]), non-dilated ICM (EF 20-25%), severe mitral regurgitation s/p mitral clip (6/13/19 Dr. Newman), severe pulm HTN, CKD, hypothyroidism, who is presenting [facility] ED [facility] Also complaining of intermittent chest pain. Of note, pt was recently discharged on 6/19 after having mitral clip procedure completed. Pt says that she has been experiencing SOB for about 1 week. However, she was never noted to be hypoxic in rehab until today. She was not able to provide much hx 2/2 SOB and being on bipap. Daughter at bedside reporting that pt was well immediately after discharge. However, she gradually developed worsening SOB. Better with rest initially. Nothing alleviating SOB now. It is constant throughout the day, made worse with exertion. No fevers, chills, nausea, vomiting, cough, sputum production, chest tightness, pressure, palpitations, LOC, syncope.  In the ED, pt afebrile, , /75, RR 22, O2 100% on 2L NC. She subsequently developed worsened work of breathing and was placed on bipap with improvement. She was given , lasix 40mg IVP x1. CXR significant for pulmonary edema. Anemia to 9.3/28.5. BUN/Cr 42/1.72 (54/2.54 on 6/19). BNP 6626. VBG 7.49/29/61/22. TTE pending. (05 Jul 2019 19:02)    Pt seen for noted 26 beats WCT earlier this AM, while asleep  Pt remained asymptomatic; VSS    PLAN:  >Continue to monitor  >Continue present  medication regimen  >Keep K > 4 Mg > 2  >Cardiology F/U  >Endorsed to day team; follow up per Attending

## 2019-07-28 NOTE — PROGRESS NOTE ADULT - ATTENDING COMMENTS
I examined Ms. Gamino.  Her case was discussed with Dr. Calle.  Ms. Gamino reports feeling weak.  SVT rhythm strips reviewed - event started with a PAC and responded to metoprolol IV.  Telemetry also showed 26 beats of an irregular WCT.  On physical exam the extremities are warm and appear well perfused.  Given arrhythmic events recommend starting metoprolol tartrate 12.5 mg bid.

## 2019-07-29 LAB
ANION GAP SERPL CALC-SCNC: 14 MMOL/L — SIGNIFICANT CHANGE UP (ref 5–17)
BUN SERPL-MCNC: 52 MG/DL — HIGH (ref 7–23)
CALCIUM SERPL-MCNC: 9.4 MG/DL — SIGNIFICANT CHANGE UP (ref 8.4–10.5)
CHLORIDE SERPL-SCNC: 102 MMOL/L — SIGNIFICANT CHANGE UP (ref 96–108)
CO2 SERPL-SCNC: 22 MMOL/L — SIGNIFICANT CHANGE UP (ref 22–31)
CREAT SERPL-MCNC: 1.61 MG/DL — HIGH (ref 0.5–1.3)
GLUCOSE BLDC GLUCOMTR-MCNC: 172 MG/DL — HIGH (ref 70–99)
GLUCOSE BLDC GLUCOMTR-MCNC: 234 MG/DL — HIGH (ref 70–99)
GLUCOSE BLDC GLUCOMTR-MCNC: 289 MG/DL — HIGH (ref 70–99)
GLUCOSE BLDC GLUCOMTR-MCNC: 296 MG/DL — HIGH (ref 70–99)
GLUCOSE SERPL-MCNC: 233 MG/DL — HIGH (ref 70–99)
HCT VFR BLD CALC: 30.2 % — LOW (ref 34.5–45)
HGB BLD-MCNC: 9 G/DL — LOW (ref 11.5–15.5)
MCHC RBC-ENTMCNC: 27.1 PG — SIGNIFICANT CHANGE UP (ref 27–34)
MCHC RBC-ENTMCNC: 29.8 GM/DL — LOW (ref 32–36)
MCV RBC AUTO: 91 FL — SIGNIFICANT CHANGE UP (ref 80–100)
PLATELET # BLD AUTO: 332 K/UL — SIGNIFICANT CHANGE UP (ref 150–400)
POTASSIUM SERPL-MCNC: 3.9 MMOL/L — SIGNIFICANT CHANGE UP (ref 3.5–5.3)
POTASSIUM SERPL-SCNC: 3.9 MMOL/L — SIGNIFICANT CHANGE UP (ref 3.5–5.3)
RBC # BLD: 3.32 M/UL — LOW (ref 3.8–5.2)
RBC # FLD: 16.5 % — HIGH (ref 10.3–14.5)
SODIUM SERPL-SCNC: 138 MMOL/L — SIGNIFICANT CHANGE UP (ref 135–145)
WBC # BLD: 7.47 K/UL — SIGNIFICANT CHANGE UP (ref 3.8–10.5)
WBC # FLD AUTO: 7.47 K/UL — SIGNIFICANT CHANGE UP (ref 3.8–10.5)

## 2019-07-29 PROCEDURE — 99233 SBSQ HOSP IP/OBS HIGH 50: CPT | Mod: GC

## 2019-07-29 PROCEDURE — 99233 SBSQ HOSP IP/OBS HIGH 50: CPT

## 2019-07-29 RX ORDER — INSULIN GLARGINE 100 [IU]/ML
15 INJECTION, SOLUTION SUBCUTANEOUS ONCE
Refills: 0 | Status: DISCONTINUED | OUTPATIENT
Start: 2019-07-29 | End: 2019-07-29

## 2019-07-29 RX ADMIN — Medication 0.12 MILLIGRAM(S): at 05:55

## 2019-07-29 RX ADMIN — Medication 50 MILLIGRAM(S): at 05:55

## 2019-07-29 RX ADMIN — ATORVASTATIN CALCIUM 40 MILLIGRAM(S): 80 TABLET, FILM COATED ORAL at 21:17

## 2019-07-29 RX ADMIN — Medication 12.5 MILLIGRAM(S): at 06:36

## 2019-07-29 RX ADMIN — POLYETHYLENE GLYCOL 3350 17 GRAM(S): 17 POWDER, FOR SOLUTION ORAL at 12:10

## 2019-07-29 RX ADMIN — Medication 26 UNIT(S): at 08:18

## 2019-07-29 RX ADMIN — Medication 50 MICROGRAM(S): at 05:55

## 2019-07-29 RX ADMIN — LIDOCAINE 1 PATCH: 4 CREAM TOPICAL at 09:20

## 2019-07-29 RX ADMIN — INSULIN GLARGINE 50 UNIT(S): 100 INJECTION, SOLUTION SUBCUTANEOUS at 21:16

## 2019-07-29 RX ADMIN — CLOPIDOGREL BISULFATE 75 MILLIGRAM(S): 75 TABLET, FILM COATED ORAL at 12:10

## 2019-07-29 RX ADMIN — LIDOCAINE 1 PATCH: 4 CREAM TOPICAL at 21:20

## 2019-07-29 RX ADMIN — Medication 100 MILLIGRAM(S): at 05:55

## 2019-07-29 RX ADMIN — LIDOCAINE 1 PATCH: 4 CREAM TOPICAL at 08:22

## 2019-07-29 RX ADMIN — Medication 50 MILLIGRAM(S): at 13:27

## 2019-07-29 RX ADMIN — MONTELUKAST 10 MILLIGRAM(S): 4 TABLET, CHEWABLE ORAL at 12:10

## 2019-07-29 RX ADMIN — Medication 3 MILLIGRAM(S): at 21:17

## 2019-07-29 RX ADMIN — ISOSORBIDE DINITRATE 30 MILLIGRAM(S): 5 TABLET ORAL at 05:55

## 2019-07-29 RX ADMIN — Medication 1: at 17:54

## 2019-07-29 RX ADMIN — Medication 60 MILLIGRAM(S): at 17:26

## 2019-07-29 RX ADMIN — SENNA PLUS 2 TABLET(S): 8.6 TABLET ORAL at 21:17

## 2019-07-29 RX ADMIN — Medication 26 UNIT(S): at 12:09

## 2019-07-29 RX ADMIN — Medication 1: at 21:17

## 2019-07-29 RX ADMIN — ISOSORBIDE DINITRATE 30 MILLIGRAM(S): 5 TABLET ORAL at 22:17

## 2019-07-29 RX ADMIN — Medication 50 MILLIGRAM(S): at 21:17

## 2019-07-29 RX ADMIN — PANTOPRAZOLE SODIUM 40 MILLIGRAM(S): 20 TABLET, DELAYED RELEASE ORAL at 05:55

## 2019-07-29 RX ADMIN — Medication 2: at 08:17

## 2019-07-29 RX ADMIN — Medication 81 MILLIGRAM(S): at 12:10

## 2019-07-29 RX ADMIN — Medication 60 MILLIGRAM(S): at 05:55

## 2019-07-29 RX ADMIN — Medication 100 MILLIGRAM(S): at 17:26

## 2019-07-29 RX ADMIN — ISOSORBIDE DINITRATE 30 MILLIGRAM(S): 5 TABLET ORAL at 13:27

## 2019-07-29 RX ADMIN — Medication 12.5 MILLIGRAM(S): at 17:27

## 2019-07-29 RX ADMIN — Medication 3: at 12:09

## 2019-07-29 NOTE — PROGRESS NOTE ADULT - SUBJECTIVE AND OBJECTIVE BOX
Dr. Muhammad  Office [phone]  Cell [phone]  [facility] FIELD  Cell [phone]      Patient is a 73y old  Female who presents with a chief complaint of Hypoxia, SOB (29 Jul 2019 12:08)      Patient seen and examined at bedside. No chest pain/sob    VITALS:  T(F): 98.2 (07-29-19 @ 12:18), Max: 98.4 (07-28-19 @ 18:19)  HR: 68 (07-29-19 @ 17:16)  BP: 109/67 (07-29-19 @ 17:16)  RR: 19 (07-29-19 @ 12:18)  SpO2: 100% (07-29-19 @ 12:18)  Wt(kg): --    07-28 @ 07:01  -  07-29 @ 07:00  --------------------------------------------------------  IN: 0 mL / OUT: 550 mL / NET: -550 mL    07-29 @ 07:01  -  07-29 @ 17:22  --------------------------------------------------------  IN: 350 mL / OUT: 0 mL / NET: 350 mL          PHYSICAL EXAM:  Constitutional: NAD  Neck: No JVD  Respiratory: CTAB, no wheezes, rales or rhonchi  Cardiovascular: S1, S2, RRR  Gastrointestinal: BS+, soft, NT/ND  Extremities: No peripheral edema    Hospital Medications:   MEDICATIONS  (STANDING):  acetaminophen  IVPB .. 1000 milliGRAM(s) IV Intermittent once  aspirin enteric coated 81 milliGRAM(s) Oral daily  atorvastatin 40 milliGRAM(s) Oral at bedtime  chlorhexidine 2% Cloths 1 Application(s) Topical daily  clopidogrel Tablet 75 milliGRAM(s) Oral daily  dextrose 5%. 1000 milliLiter(s) (50 mL/Hr) IV Continuous <Continuous>  dextrose 50% Injectable 12.5 Gram(s) IV Push once  dextrose 50% Injectable 25 Gram(s) IV Push once  dextrose 50% Injectable 25 Gram(s) IV Push once  digoxin     Tablet 0.125 milliGRAM(s) Oral daily  docusate sodium 100 milliGRAM(s) Oral two times a day  furosemide   Injectable 60 milliGRAM(s) IV Push two times a day  hydrALAZINE 50 milliGRAM(s) Oral every 8 hours  insulin glargine Injectable (LANTUS) 50 Unit(s) SubCutaneous at bedtime  insulin lispro (HumaLOG) corrective regimen sliding scale   SubCutaneous three times a day before meals  insulin lispro (HumaLOG) corrective regimen sliding scale   SubCutaneous at bedtime  insulin lispro Injectable (HumaLOG) 26 Unit(s) SubCutaneous three times a day before meals  isosorbide   dinitrate Tablet (ISORDIL) 30 milliGRAM(s) Oral three times a day  levothyroxine 50 MICROGram(s) Oral daily  lidocaine   Patch 1 Patch Transdermal daily  melatonin 3 milliGRAM(s) Oral at bedtime  metoprolol tartrate 12.5 milliGRAM(s) Oral two times a day  montelukast 10 milliGRAM(s) Oral daily  pantoprazole    Tablet 40 milliGRAM(s) Oral before breakfast  polyethylene glycol 3350 17 Gram(s) Oral daily  senna 2 Tablet(s) Oral at bedtime      LABS:  07-29    138  |  102  |  52<H>  ----------------------------<  233<H>  3.9   |  22  |  1.61<H>    Ca    9.4      29 Jul 2019 07:10  Mg     2.0     07-28      Creatinine Trend: 1.61 <--, 1.62 <--, 1.63 <--, 1.62 <--, 1.68 <--, 1.58 <--, 1.48 <--, 1.53 <--, 1.63 <--                                9.0    7.47  )-----------( 332      ( 29 Jul 2019 10:17 )             30.2     Urine Studies:  Urinalysis - [07-09-19 @ 13:28]      Color Yellow / Appearance Clear / SG 1.012 / pH 6.0      Gluc Negative / Ketone Negative  / Bili Negative / Urobili Negative       Blood Trace / Protein Trace / Leuk Est Negative / Nitrite Negative      RBC 1 / WBC 1 / Hyaline 0 / Gran  / Sq Epi  / Non Sq Epi 0 / Bacteria Moderate      Iron 42, TIBC 348, %sat 12      [07-05-19 @ 22:32]  Ferritin 130      [07-05-19 @ 22:32]  .4 (Ca --)      [04-25-19 @ 05:45]   --  PTH 43.55 (Ca --)      [09-10-18 @ 07:15]   --  PTH 36.60 (Ca --)      [09-08-18 @ 03:15]   --  Vitamin D (25OH) 25.9      [05-01-19 @ 04:00]  HbA1c 7.4      [07-05-19 @ 22:32]  TSH 2.00      [07-05-19 @ 22:32]  Lipid: chol 148, , HDL 35,       [06-01-19 @ 08:00]        RADIOLOGY & ADDITIONAL STUDIES:

## 2019-07-29 NOTE — PROGRESS NOTE ADULT - ASSESSMENT
Pt is a 72 yo woman with PMHx of HTN, T2DM, CAD s/p CABG (LIMA to LAD, SVG to OM, SVG to PDA 2014 at [facility]), non-dilated ICM (EF 20-25%), severe mitral regurgitation s/p mitral clip (6/13/19 Dr. Newman), severe pulm HTN, CKD, hypothyroidism, who is presented with worsening SOB, 2/2 acute on chronic systolic heart failure.

## 2019-07-29 NOTE — PROGRESS NOTE ADULT - SUBJECTIVE AND OBJECTIVE BOX
Slight dyspnea  Predominant complaint fatigue  Still on IV lasix            RECENT VITALS/LABS/MEDICATIONS/ASSAYS    T(C): 36.5 (07-29-19 @ 04:22), Max: 36.9 (07-28-19 @ 18:19)  HR: 69 (07-29-19 @ 06:35) (67 - 75)  BP: 108/58 (07-29-19 @ 06:35) (100/56 - 122/64)  RR: 18 (07-29-19 @ 06:00) (18 - 20)  SpO2: 100% (07-29-19 @ 06:00) (99% - 100%)  Wt(kg): --      28 Jul 2019 07:01  -  29 Jul 2019 07:00  --------------------------------------------------------  IN:  Total IN: 0 mL    OUT:    Voided: 550 mL  Total OUT: 550 mL    Total NET: -550 mL      29 Jul 2019 07:01  -  29 Jul 2019 12:13  --------------------------------------------------------  IN:    Oral Fluid: 110 mL  Total IN: 110 mL    OUT:  Total OUT: 0 mL    Total NET: 110 mL          07-28 @ 07:01  -  07-29 @ 07:00  --------------------------------------------------------  IN: 0 mL / OUT: 550 mL / NET: -550 mL    07-29 @ 07:01  -  07-29 @ 12:13  --------------------------------------------------------  IN: 110 mL / OUT: 0 mL / NET: 110 mL      CAPILLARY BLOOD GLUCOSE      POCT Blood Glucose.: 289 mg/dL (29 Jul 2019 11:34)  POCT Blood Glucose.: 234 mg/dL (29 Jul 2019 08:01)  POCT Blood Glucose.: 215 mg/dL (28 Jul 2019 21:16)  POCT Blood Glucose.: 160 mg/dL (28 Jul 2019 16:39)        acetaminophen   Tablet .. 650 milliGRAM(s) Oral every 6 hours PRN  acetaminophen  IVPB .. 1000 milliGRAM(s) IV Intermittent once  aspirin enteric coated 81 milliGRAM(s) Oral daily  atorvastatin 40 milliGRAM(s) Oral at bedtime  chlorhexidine 2% Cloths 1 Application(s) Topical daily  clopidogrel Tablet 75 milliGRAM(s) Oral daily  dextrose 40% Gel 15 Gram(s) Oral once PRN  dextrose 5%. 1000 milliLiter(s) IV Continuous <Continuous>  dextrose 50% Injectable 12.5 Gram(s) IV Push once  dextrose 50% Injectable 25 Gram(s) IV Push once  dextrose 50% Injectable 25 Gram(s) IV Push once  digoxin     Tablet 0.125 milliGRAM(s) Oral daily  docusate sodium 100 milliGRAM(s) Oral two times a day  furosemide   Injectable 60 milliGRAM(s) IV Push two times a day  glucagon  Injectable 1 milliGRAM(s) IntraMuscular once PRN  hydrALAZINE 50 milliGRAM(s) Oral every 8 hours  insulin glargine Injectable (LANTUS) 50 Unit(s) SubCutaneous at bedtime  insulin lispro (HumaLOG) corrective regimen sliding scale   SubCutaneous three times a day before meals  insulin lispro (HumaLOG) corrective regimen sliding scale   SubCutaneous at bedtime  insulin lispro Injectable (HumaLOG) 26 Unit(s) SubCutaneous three times a day before meals  isosorbide   dinitrate Tablet (ISORDIL) 30 milliGRAM(s) Oral three times a day  levothyroxine 50 MICROGram(s) Oral daily  lidocaine   Patch 1 Patch Transdermal daily  melatonin 3 milliGRAM(s) Oral at bedtime  metoprolol tartrate 12.5 milliGRAM(s) Oral two times a day  montelukast 10 milliGRAM(s) Oral daily  pantoprazole    Tablet 40 milliGRAM(s) Oral before breakfast  polyethylene glycol 3350 17 Gram(s) Oral daily  senna 2 Tablet(s) Oral at bedtime          07-29    138  |  102  |  52<H>  ----------------------------<  233<H>  3.9   |  22  |  1.61<H>    Ca    9.4      29 Jul 2019 07:10  Mg     2.0     07-28        Procalc  BNP  ABG                          9.0    7.47  )-----------( 332      ( 29 Jul 2019 10:17 )             30.2           blood and urine cultures              PERTINENT PMH REVIEWED:  [ x] YES [ ] NO           SOCIAL HISTORY:  Significant other/partner:  [ ] YES  [x ] NO            Children:  [x] YES  [ ] NO                   Yazdanism/Spirituality: Hindu  Substance hx:  [ ] YES   [x ] NO           Tobacco hx:  [ ] YES  [x ] NO             Alcohol hx: [ ] YES  [x ] NO        Home Opioid hx:  [ ] YES  [ x] NO   Living Situation: [ ] Home  [ ] Long term care  [x ] Rehab    REFERRALS:   [ ] Chaplaincy  [ ] Hospice  [ ] Child Life  [ ] Social Work  [ ] Case management [ ] Holistic Therapy     FAMILY HISTORY:  Family history of hypertension (Sibling): sister  Family history of diabetes mellitus (Sibling): brothers/sisters    BASELINE ADLs (prior to admission):  Independent [ ] moderately [ ] fully   Dependent   [ x] moderately [ ] fully    ADVANCE DIRECTIVES:  [ ] YES [x ] NO   DNR [ ] YES [x ] NO                      MOLST  [ ] YES [ x] NO    Living Will  [ ] YES [ ] NO    Health Care Proxy [ ] YES  [ ] NO      [ ] Surrogate  [x ] HCP  [ ] Guardian: Elsa Negrete- patient's daughter     Phone#: [phone]    Allergies    azithromycin (Hives; Pruritus)    Intolerances    MEDICATIONS  (STANDING):  aspirin enteric coated 81 milliGRAM(s) Oral daily  atorvastatin 40 milliGRAM(s) Oral at bedtime  chlorhexidine 2% Cloths 1 Application(s) Topical daily  clopidogrel Tablet 75 milliGRAM(s) Oral daily  dextrose 5%. 1000 milliLiter(s) (50 mL/Hr) IV Continuous <Continuous>  dextrose 50% Injectable 12.5 Gram(s) IV Push once  dextrose 50% Injectable 25 Gram(s) IV Push once  dextrose 50% Injectable 25 Gram(s) IV Push once  docusate sodium 100 milliGRAM(s) Oral two times a day  furosemide   Injectable 40 milliGRAM(s) IV Push two times a day  hydrALAZINE 50 milliGRAM(s) Oral every 8 hours  insulin glargine Injectable (LANTUS) 28 Unit(s) SubCutaneous at bedtime  insulin lispro (HumaLOG) corrective regimen sliding scale   SubCutaneous three times a day before meals  insulin lispro (HumaLOG) corrective regimen sliding scale   SubCutaneous at bedtime  insulin lispro Injectable (HumaLOG) 11 Unit(s) SubCutaneous three times a day before meals  isosorbide   dinitrate Tablet (ISORDIL) 10 milliGRAM(s) Oral three times a day  levothyroxine 50 MICROGram(s) Oral daily  lidocaine   Patch 1 Patch Transdermal daily  melatonin 3 milliGRAM(s) Oral at bedtime  montelukast 10 milliGRAM(s) Oral daily  pantoprazole    Tablet 40 milliGRAM(s) Oral before breakfast  polyethylene glycol 3350 17 Gram(s) Oral daily  senna 2 Tablet(s) Oral at bedtime    MEDICATIONS  (PRN):  acetaminophen   Tablet .. 650 milliGRAM(s) Oral every 6 hours PRN Mild Pain (1 - 3), Moderate Pain (4 - 6)  dextrose 40% Gel 15 Gram(s) Oral once PRN Blood Glucose LESS THAN 70 milliGRAM(s)/deciliter  glucagon  Injectable 1 milliGRAM(s) IntraMuscular once PRN Glucose LESS THAN 70 milligrams/deciliter      PRESENT SYMPTOMS:  Source: [ x] Patient   [ ] Family   [ ] Team     Pain: [x ] YES [ ] NO (neck)  OLDCARTS:     Dyspnea on exertion: [ x] YES [ ] NO   SOB: [x ] YES [ ] NO  Anxiety: [x ] YES [ ] NO  Fatigue: [x ] YES [ ] NO   Nausea: [ ] YES [x ] NO  Loss of appetite: [ ] YES [x ] NO   Constipation: [ ] YES [ x] NO     Other Symptoms:  [ ] All other review of systems negative   [ ] Unable to obtain due to poor mentation     Karnofsky Performance Score/Palliative Performance Status Version 2:   40-50      %  Protein Calorie Malutrition:  [x ] Mild   [ ] Moderate   [ ] Severe     Vital Signs Last 24 Hrs  T(C): 37.1 (17 Jul 2019 04:26), Max: 37.2 (16 Jul 2019 20:38)  T(F): 98.8 (17 Jul 2019 04:26), Max: 98.9 (16 Jul 2019 20:38)  HR: 67 (17 Jul 2019 04:26) (67 - 75)  BP: 109/61 (17 Jul 2019 04:26) (100/56 - 109/61)  BP(mean): --  RR: 18 (17 Jul 2019 04:26) (18 - 19)  SpO2: 100% (17 Jul 2019 04:26) (98% - 100%)    Physical Exam:    General: [ x] Alert,  A&O x 2    [x ] lethargic   [ ] Agitated   [ ] Cachexia   HEENT: [x ] Normal   [ ] Dry mouth   [ ] ET Tube    [ ] Trach   Lungs: [x ] Clear [ ] Rhonchi  [ ] Crackles [ ] Wheezing [ ] Tachypnea  [ ] Audible excessive secretions   Cardiovascular:  [x ] Regular rate and rhythm  [ ] Irregular [ ] Tachycardia   [ ] Bradycardia   Abdomen: [x ] Soft  [ ] Distended  [ ]  [x ] +BS  [x ] Non tender [ ] Tender  [ ]PEG   [ ] NGT   Last BM:     Genitourinary: [x ] Normal [ ] Incontinent   [ ] Oliguria/Anuria   [ ] Oscar  Musculoskeletal:  [ ] Normal   [x ] Generalized weakness  [ ] Bedbound   Neurological: [x ] No focal deficits  [ ] Cognitive impairment     Skin: [ x] Normal   [ ] Pressure ulcers     LABS:                        7.5    8.22  )-----------( 284      ( 17 Jul 2019 09:19 )             24.3     07-17    133<L>  |  98  |  70<H>  ----------------------------<  286<H>  4.7   |  21<L>  |  2.02<H>    Ca    9.3      17 Jul 2019 06:35  Phos  4.1     07-16  Mg     2.3     07-16    TPro  7.4  /  Alb  3.5  /  TBili  0.2  /  DBili  x   /  AST  30  /  ALT  131<H>  /  AlkPhos  133<H>  07-16        I&O's Summary    16 Jul 2019 07:01  -  17 Jul 2019 07:00  --------------------------------------------------------  IN: 120 mL / OUT: 1500 mL / NET: -1380 mL    17 Jul 2019 07:01  -  17 Jul 2019 11:50  --------------------------------------------------------  IN: 240 mL / OUT: 400 mL / NET: -160 mL    GOC Discussion:  Spoke with patient with video , [employer] #[phone] ([place].) Patient was informed about GOC discussion with HCP daughter Elsa 7/24/19. Patient indicated understanding. Conveyed to patient importance of communicating with daughter and family about her wishes/thoughts. She said she continues to be tired and weak. Elsa and family will be contacted afternoon of 7/25 for discussion about patient's care going forward.

## 2019-07-29 NOTE — PROGRESS NOTE ADULT - ASSESSMENT
Pt is a 72 yo woman with PMHx of HTN, T2DM, CAD s/p CABG (LIMA to LAD, SVG to OM, SVG to PDA 2014 at [facility]), non-dilated ICM (EF 20-25%), severe mitral regurgitation s/p mitral clip (6/13/19 Dr. Newman), severe pulm HTN, CKD, hypothyroidism, who is presenting to ED after experiencing worsening SOB      A/P:    MERVIN  Likely sec to cardiogenic shock  Renal function has improved and stable  PT non -oliguric  continue lasix per cardiology  Optimize glucose control  Monitor renal function   Avoid further nephrotoxics, NSAIDS RCA    CKD stage 4:  baseline Scr 1.8-2.0  Renal function fluctuates sec to CHF  Monitor renal function at present    SOB:  in setting of HF  Continue diuretics per cardiology    Acidosis   sec to RF  Improved  mOnitor serum Co2 at present

## 2019-07-29 NOTE — PROGRESS NOTE ADULT - ASSESSMENT
Assessment  DMT2: 73y Female with DM T2 with hyperglycemia on insulin, blood sugars Trending up  eating meals,  non compliant with low carb diet.  CAD: S/P cabgOn medications, stable, monitored.  Hypothyroidism:  On synthroid 50  mcg po daily, asymptomatic.  HTN: Controlled, On med.  HLD; on statin  CKD: Monitor labs/BMP,   ESRD: On hemodialysis, Monitor labs/BMP      Plan:   DMT2:  Continue  Lantus to 50 units qhs, Continue  Humalog to 26  units before each meal in addition to correction scale coverage, monitor FS will FU  Patient counseled for compliance with consistant low carb diet.  CAD: S/P cabg On medications, stable, monitored.  Hypothyroidism:  On synthroid 50  mcg po daily, asymptomatic. F/U tsh as outpatient  HTN: Continue treatment, monitoring, Primary team FU  HLD; on statin  CKD: Continue monitoring labs, renal FU  Thank you for consult will F/U  case discussed with NP  erika Woodard MD  [phone]

## 2019-07-29 NOTE — PROGRESS NOTE ADULT - ATTENDING COMMENTS
73 year old woman with history of remote CABG, MitraClip 6 weeks ago sent to ER from Rehab complaining of shortness of breath and lower extremity edema and hypoxemia. On exam mild volume overload. Now improved. Yesterday recurrent SVT. If conclude episodes are atrial tachycardia amiodarone likely best option for management.

## 2019-07-29 NOTE — CHART NOTE - NSCHARTNOTEFT_GEN_A_CORE
Nutrition Follow Up Note  Patient seen for: nutrition follow up on 6TOW    Chart reviewed, events noted. Pt is 74 yo woman with PMHx of HTN, T2DM (last HgbA1c 7.4%), CAD s/p CABG,  non-dilated ICM (EF 20-25%), severe mitral regurgitation s/p mitral clip, severe pulm HTN, CKD, hypothyroidism, presented to ED [facility] +acute on chronic HF, continues with diuresis. MERVIN on CKD, nephrology following.  Pt POCT improving overall, endocrinology following.    Persistent anorexia noted, "maintain nonpharmacologic strategies for now." Multifactorial fatigue noted. Debility noted. Palliative following for GO, plan for family meeting tomorrow afternoon.    Source: electronic medical record, pt (Georgian speaking, too lethargic to participate with using  services at this time, able to nod/shake head in response to some questions)    Diet : Consistent Carbohydrate (with evening snacks) + Low Fiber + Low Sodium +1500ml Fluid Restriction + Halal + Glucerna 1x/daily (provides additional 220kcal, 10g pro)     Patient reports: no acute GI distress reported. Last BM .    PO intake : per chart, pt with variable po intake % since last follow up. Pt states she is not drinking glucerna supplement being provided- observed unopened at bedside. Nutrition education not indicated at this time. RD remains available.     Source for PO intake: chart, pt     Enteral /Parenteral Nutrition: n/a    Daily Weight in k.7 (-), wt largely stable despite variable po intake noted. Continue to monitor.  Weight in k.9 (-25), Weight in k.4 (-24), 52.6 (-), Weight in k.9 (-), Weight in k.7 (-20), Weight in k.9 (-19), Weight in k.8 (-18), Weight in k.3 (-17), Weight in k.3 (-16), 52.9 (-12)    Pertinent Medications: MEDICATIONS  (STANDING):  acetaminophen  IVPB .. 1000 milliGRAM(s) IV Intermittent once  aspirin enteric coated 81 milliGRAM(s) Oral daily  atorvastatin 40 milliGRAM(s) Oral at bedtime  chlorhexidine 2% Cloths 1 Application(s) Topical daily  clopidogrel Tablet 75 milliGRAM(s) Oral daily  dextrose 5%. 1000 milliLiter(s) (50 mL/Hr) IV Continuous <Continuous>  dextrose 50% Injectable 12.5 Gram(s) IV Push once  dextrose 50% Injectable 25 Gram(s) IV Push once  dextrose 50% Injectable 25 Gram(s) IV Push once  digoxin     Tablet 0.125 milliGRAM(s) Oral daily  docusate sodium 100 milliGRAM(s) Oral two times a day  furosemide   Injectable 60 milliGRAM(s) IV Push two times a day  hydrALAZINE 50 milliGRAM(s) Oral every 8 hours  insulin glargine Injectable (LANTUS) 50 Unit(s) SubCutaneous at bedtime  insulin lispro (HumaLOG) corrective regimen sliding scale   SubCutaneous three times a day before meals  insulin lispro (HumaLOG) corrective regimen sliding scale   SubCutaneous at bedtime  insulin lispro Injectable (HumaLOG) 26 Unit(s) SubCutaneous three times a day before meals  isosorbide   dinitrate Tablet (ISORDIL) 30 milliGRAM(s) Oral three times a day  levothyroxine 50 MICROGram(s) Oral daily  lidocaine   Patch 1 Patch Transdermal daily  melatonin 3 milliGRAM(s) Oral at bedtime  metoprolol tartrate 12.5 milliGRAM(s) Oral two times a day  montelukast 10 milliGRAM(s) Oral daily  pantoprazole    Tablet 40 milliGRAM(s) Oral before breakfast  polyethylene glycol 3350 17 Gram(s) Oral daily  senna 2 Tablet(s) Oral at bedtime    MEDICATIONS  (PRN):  acetaminophen   Tablet .. 650 milliGRAM(s) Oral every 6 hours PRN Mild Pain (1 - 3), Moderate Pain (4 - 6)  dextrose 40% Gel 15 Gram(s) Oral once PRN Blood Glucose LESS THAN 70 milliGRAM(s)/deciliter  glucagon  Injectable 1 milliGRAM(s) IntraMuscular once PRN Glucose LESS THAN 70 milligrams/deciliter    Pertinent Labs:  @ 07:10: Na 138, BUN 52<H>, Cr 1.61<H>, <H>, K+ 3.9, Phos --, Mg --, Alk Phos --, ALT/SGPT --, AST/SGOT --, HbA1c --    Finger Sticks:  POCT Blood Glucose.: 289 mg/dL ( @ 11:34)  POCT Blood Glucose.: 234 mg/dL ( @ 08:01)  POCT Blood Glucose.: 215 mg/dL ( @ 21:16)  POCT Blood Glucose.: 160 mg/dL ( @ 16:39)      Skin per nursing documentation: no pressure injuries noted at this time  Edema: none noted    Estimated Needs:   [x] no change since previous assessment  [ ] recalculated:     Previous Nutrition Diagnosis: Predicted suboptimal energy intake  Nutrition Diagnosis is: ongoing, po intake noted as variable on current admission, pt ordered for oral nutrition supplement, however states she is not drinking it    New Nutrition Diagnosis: n/a    Recommend  1) Recommend liberalize diet to Consistent Carbohydrate (with evening snacks) + Low Sodium + Halal. Fluids deferred to team. Promote adequate BG control, recommend adjust insulin as needed.  2) Recommend discontinue glucerna supplement as pt states she is not drinking it. Recommend trial of Wander (f. [employer]) shakes BID.  3) Recommend consider addition of renal multivitamin  4) Monitor GOC.    Monitoring and Evaluation:     Continue to monitor Nutritional intake, Tolerance to diet prescription, weights, labs, skin integrity    RD remains available upon request and will follow up per protocol. Janneth Wang RD, CDN Pager: [phone].

## 2019-07-29 NOTE — PROGRESS NOTE ADULT - ASSESSMENT
74 yo woman with PMHx of HTN, T2DM (A1c 7.4%), CAD s/p CABG (LIMA to LAD, SVG to OM, SVG to PDA 2014 at [facility]), non-dilated ICM (EF 20-25%), severe mitral regurgitation s/p mitral clip (6/13/19 Dr. Newman), severe pulm HTN, CKD, hypothyroidism, who is presenting to ED after experiencing worsening SOB and intermittent chest [facility]. Of note, pt was recently discharged on 6/19 after having mitral clip procedure completed. She initially required BiPAP with improvement in her respiratory status following diuresis. Initiated on vasopressors and inotropes in CCU, which were subsequently weaned off. Infectious work up was negative.    #HFrEF likely 2/2 ICM with MR s/p alonso clip  - Pt remain of IV lasix 60 IV BID. Would continue this for now.   - Check daily weights, Is and Os, and daily lactates.   - The pts JVP is still elevated, however, would try to get pt on oral regimen later this week.   - C/w hydralazine 50 mg TID and isordil 30 TID  - Will consider starting spironolactone later this week.   - Try to wean O2 and get pt from bed to chair.   - She remains on IV diuretics, and her HF is labile. Anytime she has an SVT or is active she does get SOB. I imagine it will be hard for her to remain outside of the hospital even though she has improved.   - Agree with palliative c/s     #CAD  - C/w ASA and statin     #SVT likely Atrial tach v Atrial flutter   - Amio initially not given due to elevated LFTs likely in the setting of congestion.   - recheck LFTs.   - Monitor on tele.   - C/w Dig and metoprolol at current doses.   - If pt has SVT again can consider amio.       Surendra Marshall MD  Cardiology Fellow - PGY 5  Text or Call: [phone]  For all New Consults and Questions:  [email or web address]   Login: [email or web address]

## 2019-07-29 NOTE — PROGRESS NOTE ADULT - SUBJECTIVE AND OBJECTIVE BOX
Patient seen and examined at bedside.    Overnight Events: No acute events overnight.       REVIEW OF SYSTEMS:  Constitutional:     [ ] negative [ ] fevers [ ] chills [ ] weight loss [ ] weight gain  HEENT:                  [ ] negative [ ] dry eyes [ ] eye irritation [ ] postnasal drip [ ] nasal congestion  CV:                         [ ] negative  [ ] chest pain [ ] orthopnea [ ] palpitations [ ] murmur  Resp:                     [ ] negative [ ] cough [ ] shortness of breath [ x] dyspnea [ ] wheezing [ ] sputum [ ]hemoptysis  GI:                          [ ] negative [ ] nausea [ ] vomiting [ ] diarrhea [ ] constipation [ ] abd pain [ ] dysphagia   :                        [ ] negative [ ] dysuria [ ] nocturia [ ] hematuria [ ] increased urinary frequency  Musculoskeletal: [ ] negative [ ] back pain [ ] myalgias [ ] arthralgias [ ] fracture  Skin:                       [ ] negative [ ] rash [ ] itch  Neurological:        [ ] negative [ ] headache [ ] dizziness [ ] syncope [ ] weakness [ ] numbness  Psychiatric:           [ ] negative [ ] anxiety [ ] depression  Endocrine:            [ ] negative [ ] diabetes [ ] thyroid problem  Heme/Lymph:      [ ] negative [ ] anemia [ ] bleeding problem  Allergic/Immune: [ ] negative [ ] itchy eyes [ ] nasal discharge [ ] hives [ ] angioedema    [x ] All other systems negative  [ ] Unable to assess ROS due to    Current Meds:  acetaminophen   Tablet .. 650 milliGRAM(s) Oral every 6 hours PRN  acetaminophen  IVPB .. 1000 milliGRAM(s) IV Intermittent once  aspirin enteric coated 81 milliGRAM(s) Oral daily  atorvastatin 40 milliGRAM(s) Oral at bedtime  chlorhexidine 2% Cloths 1 Application(s) Topical daily  clopidogrel Tablet 75 milliGRAM(s) Oral daily  dextrose 40% Gel 15 Gram(s) Oral once PRN  dextrose 5%. 1000 milliLiter(s) IV Continuous <Continuous>  dextrose 50% Injectable 12.5 Gram(s) IV Push once  dextrose 50% Injectable 25 Gram(s) IV Push once  dextrose 50% Injectable 25 Gram(s) IV Push once  digoxin     Tablet 0.125 milliGRAM(s) Oral daily  docusate sodium 100 milliGRAM(s) Oral two times a day  furosemide   Injectable 60 milliGRAM(s) IV Push two times a day  glucagon  Injectable 1 milliGRAM(s) IntraMuscular once PRN  hydrALAZINE 50 milliGRAM(s) Oral every 8 hours  insulin glargine Injectable (LANTUS) 50 Unit(s) SubCutaneous at bedtime  insulin lispro (HumaLOG) corrective regimen sliding scale   SubCutaneous three times a day before meals  insulin lispro (HumaLOG) corrective regimen sliding scale   SubCutaneous at bedtime  insulin lispro Injectable (HumaLOG) 26 Unit(s) SubCutaneous three times a day before meals  isosorbide   dinitrate Tablet (ISORDIL) 30 milliGRAM(s) Oral three times a day  levothyroxine 50 MICROGram(s) Oral daily  lidocaine   Patch 1 Patch Transdermal daily  melatonin 3 milliGRAM(s) Oral at bedtime  metoprolol tartrate 12.5 milliGRAM(s) Oral two times a day  montelukast 10 milliGRAM(s) Oral daily  pantoprazole    Tablet 40 milliGRAM(s) Oral before breakfast  polyethylene glycol 3350 17 Gram(s) Oral daily  senna 2 Tablet(s) Oral at bedtime      PAST MEDICAL & SURGICAL HISTORY:  GERD (gastroesophageal reflux disease)  CAD (coronary artery disease): s/p CABG  Hypothyroidism  Hyperlipidemia  Diabetes mellitus, type 2  S/P CABG (coronary artery bypass graft)      Vitals:  T(F): 97.7 (07-29), Max: 98.6 (07-28)  HR: 69 (07-29) (67 - 81)  BP: 108/58 (07-29) (100/56 - 122/64)  RR: 18 (07-29)  SpO2: 100% (07-29)  I&O's Summary    28 Jul 2019 07:01  -  29 Jul 2019 07:00  --------------------------------------------------------  IN: 0 mL / OUT: 550 mL / NET: -550 mL        Physical Exam:  Appearance: No acute distress; well appearing  Eyes: PERRL, EOMI, pink conjunctiva  HENT: Normal oral mucosa  Cardiovascular: RRR, S1, S2, no murmurs, rubs, or gallops; no edema; mildly elevated JVD  Respiratory: Clear to auscultation bilaterally  Gastrointestinal: soft, non-tender, non-distended with normal bowel sounds  Musculoskeletal: No clubbing; no joint deformity   Neurologic: Non-focal  Lymphatic: No lymphadenopathy  Psychiatry: AAOx3, mood & affect appropriate  Skin: No rashes, ecchymoses, or cyanosis                          10.0   8.5   )-----------( 361      ( 28 Jul 2019 05:42 )             28.8     07-29    138  |  102  |  52<H>  ----------------------------<  233<H>  3.9   |  22  |  1.61<H>    Ca    9.4      29 Jul 2019 07:10  Mg     2.0     07-28                    New ECG(s): Personally reviewed    Echo:  < from: TTE with Doppler (w/Cont) (07.09.19 @ 07:49) >  1. Severe global left ventricular systolic dysfunction.  Endocardial visualization enhanced with intravenous  injection of Ultrasonic Enhancing Agent (Definity).  2. The right ventricle is not well visualized; grossly  normal right ventricular systolic function.  3. Estimated right ventricular systolic pressure equals 67  mm Hg, assuming right atrial pressure equals 15 mm Hg,  consistent with severe pulmonary hypertension.  4. Normal tricuspid valve. Mild-moderate tricuspid  regurgitation.    < end of copied text >          Interpretation of Telemetry: Sinus 60- 80 with PVCs

## 2019-07-29 NOTE — PROGRESS NOTE ADULT - SUBJECTIVE AND OBJECTIVE BOX
CHIEF COMPLAINT:    SUBJECTIVE:     REVIEW OF SYSTEMS:    CONSTITUTIONAL: (  )  weakness,  (  ) fevers or chills  EYES/ENT: (  )visual changes;     NECK: (  ) pain or stiffness  RESPIRATORY:   (  )cough, wheezing, hemoptysis;  (  ) shortness of breath  CARDIOVASCULAR:  (  )chest pain or palpitations  GASTROINTESTINAL:   (  )abdominal or epigastric pain.  (  ) nausea, vomiting, or hematemesis;   (   ) diarrhea or constipation.   GENITOURINARY:   (    ) dysuria, frequency or hematuria  NEUROLOGICAL:  (   ) numbness or weakness   All other review of systems is negative unless indicated above    Vital Signs Last 24 Hrs  T(C): 36.8 (29 Jul 2019 12:18), Max: 36.8 (29 Jul 2019 12:18)  T(F): 98.2 (29 Jul 2019 12:18), Max: 98.2 (29 Jul 2019 12:18)  HR: 68 (29 Jul 2019 17:16) (67 - 74)  BP: 109/67 (29 Jul 2019 17:16) (108/58 - 122/64)  BP(mean): --  RR: 19 (29 Jul 2019 12:18) (18 - 19)  SpO2: 100% (29 Jul 2019 12:18) (100% - 100%)    I&O's Summary    28 Jul 2019 07:01  -  29 Jul 2019 07:00  --------------------------------------------------------  IN: 0 mL / OUT: 550 mL / NET: -550 mL    29 Jul 2019 07:01  -  29 Jul 2019 21:01  --------------------------------------------------------  IN: 350 mL / OUT: 250 mL / NET: 100 mL        CAPILLARY BLOOD GLUCOSE      POCT Blood Glucose.: 172 mg/dL (29 Jul 2019 17:16)  POCT Blood Glucose.: 289 mg/dL (29 Jul 2019 11:34)  POCT Blood Glucose.: 234 mg/dL (29 Jul 2019 08:01)  POCT Blood Glucose.: 215 mg/dL (28 Jul 2019 21:16)      PHYSICAL EXAM:    Constitutional:  (   ) NAD,   (   )awake and alert  HEENT: PERR, EOMI,    Neck: Soft and supple, No LAD, No JVD  Respiratory:  (    Breath sounds are clear bilaterally,    (   ) wheezing, rales or rhonchi  Cardiovascular:     (   )S1 and S2, regular rate and rhythm, no Murmurs, gallops or rubs  Gastrointestinal:  (   )Bowel Sounds present, soft,   (  )nontender, nondistended,    Extremities:    (  ) peripheral edema  Vascular: 2+ peripheral pulses  Neurological:    (    )A/O x 3,   (  ) focal deficits  Musculoskeletal:    (   )  normal strength b/l upper  (     ) normal  lower extremities  Skin: No rashes    MEDICATIONS:  MEDICATIONS  (STANDING):  acetaminophen  IVPB .. 1000 milliGRAM(s) IV Intermittent once  aspirin enteric coated 81 milliGRAM(s) Oral daily  atorvastatin 40 milliGRAM(s) Oral at bedtime  chlorhexidine 2% Cloths 1 Application(s) Topical daily  clopidogrel Tablet 75 milliGRAM(s) Oral daily  dextrose 5%. 1000 milliLiter(s) (50 mL/Hr) IV Continuous <Continuous>  dextrose 50% Injectable 12.5 Gram(s) IV Push once  dextrose 50% Injectable 25 Gram(s) IV Push once  dextrose 50% Injectable 25 Gram(s) IV Push once  digoxin     Tablet 0.125 milliGRAM(s) Oral daily  docusate sodium 100 milliGRAM(s) Oral two times a day  furosemide   Injectable 60 milliGRAM(s) IV Push two times a day  hydrALAZINE 50 milliGRAM(s) Oral every 8 hours  insulin glargine Injectable (LANTUS) 50 Unit(s) SubCutaneous at bedtime  insulin lispro (HumaLOG) corrective regimen sliding scale   SubCutaneous three times a day before meals  insulin lispro (HumaLOG) corrective regimen sliding scale   SubCutaneous at bedtime  insulin lispro Injectable (HumaLOG) 26 Unit(s) SubCutaneous three times a day before meals  isosorbide   dinitrate Tablet (ISORDIL) 30 milliGRAM(s) Oral three times a day  levothyroxine 50 MICROGram(s) Oral daily  lidocaine   Patch 1 Patch Transdermal daily  melatonin 3 milliGRAM(s) Oral at bedtime  metoprolol tartrate 12.5 milliGRAM(s) Oral two times a day  montelukast 10 milliGRAM(s) Oral daily  pantoprazole    Tablet 40 milliGRAM(s) Oral before breakfast  polyethylene glycol 3350 17 Gram(s) Oral daily  senna 2 Tablet(s) Oral at bedtime      LABS: All Labs Reviewed:                        9.0    7.47  )-----------( 332      ( 29 Jul 2019 10:17 )             30.2     07-29    138  |  102  |  52<H>  ----------------------------<  233<H>  3.9   |  22  |  1.61<H>    Ca    9.4      29 Jul 2019 07:10  Mg     2.0     07-28            Blood Culture:   Urine Culture      RADIOLOGY/EKG:    ASSESSMENT AND PLAN:    DVT PPX:    ADVANCED DIRECTIVE:    DISPOSITION: CHIEF COMPLAINT:  patient is seen laying in the bed and daughter at the bedside patient feels better at the still complained of weakness cardiology follow-up noted patient is still on IV Lasix  SUBJECTIVE:     REVIEW OF SYSTEMS:    CONSTITUTIONAL: ( x )  weakness,  (  ) fevers or chills  EYES/ENT: (  )visual changes;     NECK: (  ) pain or stiffness  RESPIRATORY:   (  )cough, wheezing, hemoptysis;  (  ) shortness of breath  CARDIOVASCULAR:  (  )chest pain or palpitations  GASTROINTESTINAL:   (  )abdominal or epigastric pain.  (  ) nausea, vomiting, or hematemesis;   (   ) diarrhea or constipation.   GENITOURINARY:   (    ) dysuria, frequency or hematuria  NEUROLOGICAL:  (   ) numbness or weakness   All other review of systems is negative unless indicated above    Vital Signs Last 24 Hrs  T(C): 36.8 (29 Jul 2019 12:18), Max: 36.8 (29 Jul 2019 12:18)  T(F): 98.2 (29 Jul 2019 12:18), Max: 98.2 (29 Jul 2019 12:18)  HR: 68 (29 Jul 2019 17:16) (67 - 74)  BP: 109/67 (29 Jul 2019 17:16) (108/58 - 122/64)  BP(mean): --  RR: 19 (29 Jul 2019 12:18) (18 - 19)  SpO2: 100% (29 Jul 2019 12:18) (100% - 100%)    I&O's Summary    28 Jul 2019 07:01  -  29 Jul 2019 07:00  --------------------------------------------------------  IN: 0 mL / OUT: 550 mL / NET: -550 mL    29 Jul 2019 07:01  -  29 Jul 2019 21:01  --------------------------------------------------------  IN: 350 mL / OUT: 250 mL / NET: 100 mL        CAPILLARY BLOOD GLUCOSE      POCT Blood Glucose.: 172 mg/dL (29 Jul 2019 17:16)  POCT Blood Glucose.: 289 mg/dL (29 Jul 2019 11:34)  POCT Blood Glucose.: 234 mg/dL (29 Jul 2019 08:01)  POCT Blood Glucose.: 215 mg/dL (28 Jul 2019 21:16)      PHYSICAL EXAM:    Constitutional:  ( x ) NAD,   (  x )awake and alert  HEENT: PERR, EOMI,    Neck: Soft and supple, No LAD, No JVD  Respiratory:  (  x  Breath sounds are clear bilaterally,    ( x  ) wheezing, rales or rhonchi  Cardiovascular:     (  x )S1 and S2, regular rate and rhythm, no Murmurs, gallops or rubs  Gastrointestinal:  (   )Bowel Sounds present, soft,   (  )nontender, nondistended,    Extremities:    (  ) peripheral edema  Vascular: 2+ peripheral pulses  Neurological:    (    )A/O x 3,   (  ) focal deficits  Musculoskeletal:    (   )  normal strength b/l upper  (     ) normal  lower extremities  Skin: No rashes    MEDICATIONS:  MEDICATIONS  (STANDING):  acetaminophen  IVPB .. 1000 milliGRAM(s) IV Intermittent once  aspirin enteric coated 81 milliGRAM(s) Oral daily  atorvastatin 40 milliGRAM(s) Oral at bedtime  chlorhexidine 2% Cloths 1 Application(s) Topical daily  clopidogrel Tablet 75 milliGRAM(s) Oral daily  dextrose 5%. 1000 milliLiter(s) (50 mL/Hr) IV Continuous <Continuous>  dextrose 50% Injectable 12.5 Gram(s) IV Push once  dextrose 50% Injectable 25 Gram(s) IV Push once  dextrose 50% Injectable 25 Gram(s) IV Push once  digoxin     Tablet 0.125 milliGRAM(s) Oral daily  docusate sodium 100 milliGRAM(s) Oral two times a day  furosemide   Injectable 60 milliGRAM(s) IV Push two times a day  hydrALAZINE 50 milliGRAM(s) Oral every 8 hours  insulin glargine Injectable (LANTUS) 50 Unit(s) SubCutaneous at bedtime  insulin lispro (HumaLOG) corrective regimen sliding scale   SubCutaneous three times a day before meals  insulin lispro (HumaLOG) corrective regimen sliding scale   SubCutaneous at bedtime  insulin lispro Injectable (HumaLOG) 26 Unit(s) SubCutaneous three times a day before meals  isosorbide   dinitrate Tablet (ISORDIL) 30 milliGRAM(s) Oral three times a day  levothyroxine 50 MICROGram(s) Oral daily  lidocaine   Patch 1 Patch Transdermal daily  melatonin 3 milliGRAM(s) Oral at bedtime  metoprolol tartrate 12.5 milliGRAM(s) Oral two times a day  montelukast 10 milliGRAM(s) Oral daily  pantoprazole    Tablet 40 milliGRAM(s) Oral before breakfast  polyethylene glycol 3350 17 Gram(s) Oral daily  senna 2 Tablet(s) Oral at bedtime      LABS: All Labs Reviewed:                        9.0    7.47  )-----------( 332      ( 29 Jul 2019 10:17 )             30.2     07-29    138  |  102  |  52<H>  ----------------------------<  233<H>  3.9   |  22  |  1.61<H>    Ca    9.4      29 Jul 2019 07:10  Mg     2.0     07-28            Blood Culture:   Urine Culture      RADIOLOGY/EKG:    ASSESSMENT AND PLAN:  CHF/CAD/status post mitral valve clip condition remained unchanged as per cardiology continue IV Lasix they did not advised to change it to oral Lasix and possible starting of Aldactone by end of this week discussed with her daughter in detail about the plan and care treatment possible placement in the next 7-10 days home versus rehab  DVT PPX:    ADVANCED DIRECTIVE:    DISPOSITION:

## 2019-07-29 NOTE — PROGRESS NOTE ADULT - SUBJECTIVE AND OBJECTIVE BOX
Endocrinology Attending Covering for Dr. Banks      Chief complaint  Patient is a 73y old  Female who presents with a chief complaint of Hypoxia, SOB (29 Jul 2019 09:55)   Review of systems  Patient in bed, looks comfortable, no fever,  had no hypoglycemia.  not compliant with diet  Labs and Fingersticks  CAPILLARY BLOOD GLUCOSE      POCT Blood Glucose.: 234 mg/dL (29 Jul 2019 08:01)  POCT Blood Glucose.: 215 mg/dL (28 Jul 2019 21:16)  POCT Blood Glucose.: 160 mg/dL (28 Jul 2019 16:39)  POCT Blood Glucose.: 191 mg/dL (28 Jul 2019 12:01)      Anion Gap, Serum: 14 (07-29 @ 07:10)  Anion Gap, Serum: 17 (07-28 @ 05:42)      Calcium, Total Serum: 9.4 (07-29 @ 07:10)  Calcium, Total Serum: 9.5 (07-28 @ 05:42)          07-29    138  |  102  |  52<H>  ----------------------------<  233<H>  3.9   |  22  |  1.61<H>    Ca    9.4      29 Jul 2019 07:10  Mg     2.0     07-28                          9.0    7.47  )-----------( 332      ( 29 Jul 2019 10:17 )             30.2     Medications  MEDICATIONS  (STANDING):  acetaminophen  IVPB .. 1000 milliGRAM(s) IV Intermittent once  aspirin enteric coated 81 milliGRAM(s) Oral daily  atorvastatin 40 milliGRAM(s) Oral at bedtime  chlorhexidine 2% Cloths 1 Application(s) Topical daily  clopidogrel Tablet 75 milliGRAM(s) Oral daily  dextrose 5%. 1000 milliLiter(s) (50 mL/Hr) IV Continuous <Continuous>  dextrose 50% Injectable 12.5 Gram(s) IV Push once  dextrose 50% Injectable 25 Gram(s) IV Push once  dextrose 50% Injectable 25 Gram(s) IV Push once  digoxin     Tablet 0.125 milliGRAM(s) Oral daily  docusate sodium 100 milliGRAM(s) Oral two times a day  furosemide   Injectable 60 milliGRAM(s) IV Push two times a day  hydrALAZINE 50 milliGRAM(s) Oral every 8 hours  insulin glargine Injectable (LANTUS) 50 Unit(s) SubCutaneous at bedtime  insulin lispro (HumaLOG) corrective regimen sliding scale   SubCutaneous three times a day before meals  insulin lispro (HumaLOG) corrective regimen sliding scale   SubCutaneous at bedtime  insulin lispro Injectable (HumaLOG) 26 Unit(s) SubCutaneous three times a day before meals  isosorbide   dinitrate Tablet (ISORDIL) 30 milliGRAM(s) Oral three times a day  levothyroxine 50 MICROGram(s) Oral daily  lidocaine   Patch 1 Patch Transdermal daily  melatonin 3 milliGRAM(s) Oral at bedtime  metoprolol tartrate 12.5 milliGRAM(s) Oral two times a day  montelukast 10 milliGRAM(s) Oral daily  pantoprazole    Tablet 40 milliGRAM(s) Oral before breakfast  polyethylene glycol 3350 17 Gram(s) Oral daily  senna 2 Tablet(s) Oral at bedtime      Physical Exam  General: Patient comfortable in bed  Vital Signs Last 12 Hrs  T(F): 97.7 (07-29-19 @ 04:22), Max: 97.7 (07-29-19 @ 04:22)  HR: 69 (07-29-19 @ 06:35) (69 - 71)  BP: 108/58 (07-29-19 @ 06:35) (108/58 - 116/60)  BP(mean): --  RR: 18 (07-29-19 @ 06:00) (18 - 18)  SpO2: 100% (07-29-19 @ 06:00) (100% - 100%)  Neck: No palpable thyroid nodules.  CVS: S1S2, No murmurs  Respiratory: No wheezing, no crepitations  GI: Abdomen soft, bowel sounds positive  Musculoskeletal:  edema lower extremities.   Skin: No skin rashes, no ecchymosis    Diagnostics

## 2019-07-30 LAB
ANION GAP SERPL CALC-SCNC: 15 MMOL/L — SIGNIFICANT CHANGE UP (ref 5–17)
BUN SERPL-MCNC: 54 MG/DL — HIGH (ref 7–23)
CALCIUM SERPL-MCNC: 9.5 MG/DL — SIGNIFICANT CHANGE UP (ref 8.4–10.5)
CHLORIDE SERPL-SCNC: 102 MMOL/L — SIGNIFICANT CHANGE UP (ref 96–108)
CO2 SERPL-SCNC: 21 MMOL/L — LOW (ref 22–31)
CREAT SERPL-MCNC: 1.69 MG/DL — HIGH (ref 0.5–1.3)
GLUCOSE BLDC GLUCOMTR-MCNC: 199 MG/DL — HIGH (ref 70–99)
GLUCOSE BLDC GLUCOMTR-MCNC: 223 MG/DL — HIGH (ref 70–99)
GLUCOSE BLDC GLUCOMTR-MCNC: 303 MG/DL — HIGH (ref 70–99)
GLUCOSE BLDC GLUCOMTR-MCNC: 326 MG/DL — HIGH (ref 70–99)
GLUCOSE SERPL-MCNC: 277 MG/DL — HIGH (ref 70–99)
HCT VFR BLD CALC: 27.9 % — LOW (ref 34.5–45)
HGB BLD-MCNC: 9.3 G/DL — LOW (ref 11.5–15.5)
MCHC RBC-ENTMCNC: 29.1 PG — SIGNIFICANT CHANGE UP (ref 27–34)
MCHC RBC-ENTMCNC: 33.3 GM/DL — SIGNIFICANT CHANGE UP (ref 32–36)
MCV RBC AUTO: 87.4 FL — SIGNIFICANT CHANGE UP (ref 80–100)
PLATELET # BLD AUTO: 324 K/UL — SIGNIFICANT CHANGE UP (ref 150–400)
POTASSIUM SERPL-MCNC: 3.9 MMOL/L — SIGNIFICANT CHANGE UP (ref 3.5–5.3)
POTASSIUM SERPL-SCNC: 3.9 MMOL/L — SIGNIFICANT CHANGE UP (ref 3.5–5.3)
RBC # BLD: 3.19 M/UL — LOW (ref 3.8–5.2)
RBC # FLD: 16.5 % — HIGH (ref 10.3–14.5)
SODIUM SERPL-SCNC: 138 MMOL/L — SIGNIFICANT CHANGE UP (ref 135–145)
WBC # BLD: 7.1 K/UL — SIGNIFICANT CHANGE UP (ref 3.8–10.5)
WBC # FLD AUTO: 7.1 K/UL — SIGNIFICANT CHANGE UP (ref 3.8–10.5)

## 2019-07-30 PROCEDURE — 99233 SBSQ HOSP IP/OBS HIGH 50: CPT | Mod: GC

## 2019-07-30 RX ORDER — SPIRONOLACTONE 25 MG/1
12.5 TABLET, FILM COATED ORAL DAILY
Refills: 0 | Status: DISCONTINUED | OUTPATIENT
Start: 2019-07-30 | End: 2019-08-01

## 2019-07-30 RX ORDER — INSULIN GLARGINE 100 [IU]/ML
56 INJECTION, SOLUTION SUBCUTANEOUS AT BEDTIME
Refills: 0 | Status: DISCONTINUED | OUTPATIENT
Start: 2019-07-30 | End: 2019-07-31

## 2019-07-30 RX ORDER — INSULIN LISPRO 100/ML
28 VIAL (ML) SUBCUTANEOUS
Refills: 0 | Status: DISCONTINUED | OUTPATIENT
Start: 2019-07-30 | End: 2019-07-31

## 2019-07-30 RX ADMIN — ISOSORBIDE DINITRATE 30 MILLIGRAM(S): 5 TABLET ORAL at 15:32

## 2019-07-30 RX ADMIN — ATORVASTATIN CALCIUM 40 MILLIGRAM(S): 80 TABLET, FILM COATED ORAL at 21:08

## 2019-07-30 RX ADMIN — Medication 50 MILLIGRAM(S): at 15:32

## 2019-07-30 RX ADMIN — Medication 50 MICROGRAM(S): at 05:06

## 2019-07-30 RX ADMIN — Medication 12.5 MILLIGRAM(S): at 17:43

## 2019-07-30 RX ADMIN — Medication 4: at 12:17

## 2019-07-30 RX ADMIN — Medication 3 MILLIGRAM(S): at 21:08

## 2019-07-30 RX ADMIN — Medication 60 MILLIGRAM(S): at 17:43

## 2019-07-30 RX ADMIN — ISOSORBIDE DINITRATE 30 MILLIGRAM(S): 5 TABLET ORAL at 05:57

## 2019-07-30 RX ADMIN — Medication 100 MILLIGRAM(S): at 17:43

## 2019-07-30 RX ADMIN — LIDOCAINE 1 PATCH: 4 CREAM TOPICAL at 21:12

## 2019-07-30 RX ADMIN — LIDOCAINE 1 PATCH: 4 CREAM TOPICAL at 07:00

## 2019-07-30 RX ADMIN — POLYETHYLENE GLYCOL 3350 17 GRAM(S): 17 POWDER, FOR SOLUTION ORAL at 12:20

## 2019-07-30 RX ADMIN — Medication 50 MILLIGRAM(S): at 05:06

## 2019-07-30 RX ADMIN — PANTOPRAZOLE SODIUM 40 MILLIGRAM(S): 20 TABLET, DELAYED RELEASE ORAL at 05:06

## 2019-07-30 RX ADMIN — MONTELUKAST 10 MILLIGRAM(S): 4 TABLET, CHEWABLE ORAL at 12:20

## 2019-07-30 RX ADMIN — INSULIN GLARGINE 56 UNIT(S): 100 INJECTION, SOLUTION SUBCUTANEOUS at 22:07

## 2019-07-30 RX ADMIN — CLOPIDOGREL BISULFATE 75 MILLIGRAM(S): 75 TABLET, FILM COATED ORAL at 12:20

## 2019-07-30 RX ADMIN — Medication 0.12 MILLIGRAM(S): at 05:06

## 2019-07-30 RX ADMIN — LIDOCAINE 1 PATCH: 4 CREAM TOPICAL at 09:00

## 2019-07-30 RX ADMIN — ISOSORBIDE DINITRATE 30 MILLIGRAM(S): 5 TABLET ORAL at 22:07

## 2019-07-30 RX ADMIN — SENNA PLUS 2 TABLET(S): 8.6 TABLET ORAL at 21:08

## 2019-07-30 RX ADMIN — Medication 60 MILLIGRAM(S): at 05:58

## 2019-07-30 RX ADMIN — Medication 100 MILLIGRAM(S): at 05:05

## 2019-07-30 RX ADMIN — Medication 1: at 16:57

## 2019-07-30 RX ADMIN — Medication 28 UNIT(S): at 16:57

## 2019-07-30 RX ADMIN — SPIRONOLACTONE 12.5 MILLIGRAM(S): 25 TABLET, FILM COATED ORAL at 15:35

## 2019-07-30 RX ADMIN — Medication 50 MILLIGRAM(S): at 21:08

## 2019-07-30 RX ADMIN — Medication 4: at 08:16

## 2019-07-30 RX ADMIN — Medication 81 MILLIGRAM(S): at 12:20

## 2019-07-30 RX ADMIN — Medication 12.5 MILLIGRAM(S): at 05:05

## 2019-07-30 RX ADMIN — Medication 26 UNIT(S): at 08:15

## 2019-07-30 RX ADMIN — Medication 26 UNIT(S): at 12:17

## 2019-07-30 NOTE — PROGRESS NOTE ADULT - ASSESSMENT
Pt is a 72 yo woman with PMHx of HTN, T2DM, CAD s/p CABG (LIMA to LAD, SVG to OM, SVG to PDA 2014 at [facility]), non-dilated ICM (EF 20-25%), severe mitral regurgitation s/p mitral clip (6/13/19 Dr. Newman), severe pulm HTN, CKD, hypothyroidism, who is presenting to ED after experiencing worsening SOB      A/P:    MERVIN  Likely sec to cardiogenic shock  Renal function has improved and stable  PT non -oliguric  continue lasix per cardiology  Optimize glucose control  Monitor renal function   Avoid further nephrotoxics, NSAIDS RCA    CKD stage 4:  baseline Scr 1.8-2.0  Renal function fluctuates sec to CHF  Monitor renal function at present    SOB:  in setting of HF  Continue diuretics per cardiology    Acidosis   sec to RF  Improved  mOnitor serum Co2 at present

## 2019-07-30 NOTE — PROGRESS NOTE ADULT - ASSESSMENT
74 yo woman with PMHx of HTN, T2DM (A1c 7.4%), CAD s/p CABG (LIMA to LAD, SVG to OM, SVG to PDA 2014 at [facility]), non-dilated ICM (EF 20-25%), severe mitral regurgitation s/p mitral clip (6/13/19 Dr. Newman), severe pulm HTN, CKD, hypothyroidism, who is presenting to ED after experiencing worsening SOB and intermittent chest [facility]. Of note, pt was recently discharged on 6/19 after having mitral clip procedure completed. She initially required BiPAP with improvement in her respiratory status following diuresis. Initiated on vasopressors and inotropes in CCU, which were subsequently weaned off. Infectious work up was negative.    #HFrEF likely 2/2 ICM with MR s/p alonso clip  - Pt remain of IV lasix 60 IV BID. Would change this to Torsemide 100 mg daily starting tomorrow   - Check daily weights, Is and Os, and daily lactates.   - The pts JVP is still elevated, however, would try to get pt on oral regimen.  - C/w hydralazine 50 mg TID and isordil 30 TID  - Start Spironolactone 12.5 mg daily today.   - Try to wean O2 and get pt from bed to chair.   - She remains on IV diuretics, and her HF is labile. Anytime she has an SVT or is active she does get SOB. I imagine it will be hard for her to remain outside of the hospital even though she has improved.   - Agree with palliative c/s. family meeting today.     #CAD  - C/w ASA and statin     #SVT likely Atrial tach v Atrial flutter   - Amio initially not given due to elevated LFTs likely in the setting of congestion.   - recheck LFTs.   - Monitor on tele.   - C/w Dig and metoprolol at current doses.   - If pt has SVT again can consider amio.       Surendra Marshall MD  Cardiology Fellow - PGY 5  Text or Call: [phone]  For all New Consults and Questions:  [email or web address]   Login: [employer]

## 2019-07-30 NOTE — GOALS OF CARE CONVERSATION - PERSONAL ADVANCE DIRECTIVE - PARTICIPANTS
Family.../Patient defers/Patient/Staff...
Patient/Translation Device: [id number]
Patient/Family.../she defers decision making to daughter. see prior notes

## 2019-07-30 NOTE — PROGRESS NOTE ADULT - SUBJECTIVE AND OBJECTIVE BOX
CHIEF COMPLAINT:    SUBJECTIVE:     REVIEW OF SYSTEMS:    CONSTITUTIONAL: (  )  weakness,  (  ) fevers or chills  EYES/ENT: (  )visual changes;     NECK: (  ) pain or stiffness  RESPIRATORY:   (  )cough, wheezing, hemoptysis;  (  ) shortness of breath  CARDIOVASCULAR:  (  )chest pain or palpitations  GASTROINTESTINAL:   (  )abdominal or epigastric pain.  (  ) nausea, vomiting, or hematemesis;   (   ) diarrhea or constipation.   GENITOURINARY:   (    ) dysuria, frequency or hematuria  NEUROLOGICAL:  (   ) numbness or weakness   All other review of systems is negative unless indicated above    Vital Signs Last 24 Hrs  T(C): 36.6 (30 Jul 2019 04:07), Max: 36.8 (29 Jul 2019 12:18)  T(F): 97.9 (30 Jul 2019 04:07), Max: 98.2 (29 Jul 2019 12:18)  HR: 69 (30 Jul 2019 06:00) (68 - 74)  BP: 106/62 (30 Jul 2019 06:00) (106/62 - 122/64)  BP(mean): --  RR: 18 (30 Jul 2019 04:07) (18 - 19)  SpO2: 100% (30 Jul 2019 04:07) (100% - 100%)    I&O's Summary    29 Jul 2019 07:01  -  30 Jul 2019 07:00  --------------------------------------------------------  IN: 350 mL / OUT: 750 mL / NET: -400 mL        CAPILLARY BLOOD GLUCOSE      POCT Blood Glucose.: 303 mg/dL (30 Jul 2019 07:32)  POCT Blood Glucose.: 296 mg/dL (29 Jul 2019 21:11)  POCT Blood Glucose.: 172 mg/dL (29 Jul 2019 17:16)  POCT Blood Glucose.: 289 mg/dL (29 Jul 2019 11:34)      PHYSICAL EXAM:    Constitutional:  (   ) NAD,   (   )awake and alert  HEENT: PERR, EOMI,    Neck: Soft and supple, No LAD, No JVD  Respiratory:  (    Breath sounds are clear bilaterally,    (   ) wheezing, rales or rhonchi  Cardiovascular:     (   )S1 and S2, regular rate and rhythm, no Murmurs, gallops or rubs  Gastrointestinal:  (   )Bowel Sounds present, soft,   (  )nontender, nondistended,    Extremities:    (  ) peripheral edema  Vascular: 2+ peripheral pulses  Neurological:    (    )A/O x 3,   (  ) focal deficits  Musculoskeletal:    (   )  normal strength b/l upper  (     ) normal  lower extremities  Skin: No rashes    MEDICATIONS:  MEDICATIONS  (STANDING):  acetaminophen  IVPB .. 1000 milliGRAM(s) IV Intermittent once  aspirin enteric coated 81 milliGRAM(s) Oral daily  atorvastatin 40 milliGRAM(s) Oral at bedtime  chlorhexidine 2% Cloths 1 Application(s) Topical daily  clopidogrel Tablet 75 milliGRAM(s) Oral daily  dextrose 5%. 1000 milliLiter(s) (50 mL/Hr) IV Continuous <Continuous>  dextrose 50% Injectable 12.5 Gram(s) IV Push once  dextrose 50% Injectable 25 Gram(s) IV Push once  dextrose 50% Injectable 25 Gram(s) IV Push once  digoxin     Tablet 0.125 milliGRAM(s) Oral daily  docusate sodium 100 milliGRAM(s) Oral two times a day  furosemide   Injectable 60 milliGRAM(s) IV Push two times a day  hydrALAZINE 50 milliGRAM(s) Oral every 8 hours  insulin glargine Injectable (LANTUS) 50 Unit(s) SubCutaneous at bedtime  insulin lispro (HumaLOG) corrective regimen sliding scale   SubCutaneous three times a day before meals  insulin lispro (HumaLOG) corrective regimen sliding scale   SubCutaneous at bedtime  insulin lispro Injectable (HumaLOG) 26 Unit(s) SubCutaneous three times a day before meals  isosorbide   dinitrate Tablet (ISORDIL) 30 milliGRAM(s) Oral three times a day  levothyroxine 50 MICROGram(s) Oral daily  lidocaine   Patch 1 Patch Transdermal daily  melatonin 3 milliGRAM(s) Oral at bedtime  metoprolol tartrate 12.5 milliGRAM(s) Oral two times a day  montelukast 10 milliGRAM(s) Oral daily  pantoprazole    Tablet 40 milliGRAM(s) Oral before breakfast  polyethylene glycol 3350 17 Gram(s) Oral daily  senna 2 Tablet(s) Oral at bedtime      LABS: All Labs Reviewed:                        9.3    7.1   )-----------( 324      ( 30 Jul 2019 08:28 )             27.9     07-30    138  |  102  |  54<H>  ----------------------------<  277<H>  3.9   |  21<L>  |  1.69<H>    Ca    9.5      30 Jul 2019 08:28            Blood Culture:   Urine Culture      RADIOLOGY/EKG:    ASSESSMENT AND PLAN:    DVT PPX:    ADVANCED DIRECTIVE:    DISPOSITION: CHIEF COMPLAINT:    patient is seen laying in the bed and daughter  not at the bedside patient feels better at the still complained of weakness cardiology follow-up noted patient is still on IV LasixSUBJECTIVE:     REVIEW OF SYSTEMS:    CONSTITUTIONAL: ( x )  weakness,  (  ) fevers or chills  EYES/ENT: (  )visual changes;     NECK: (  ) pain or stiffness  RESPIRATORY:   (  )cough, wheezing, hemoptysis;  (  ) shortness of breath  CARDIOVASCULAR:  (  )chest pain or palpitations  GASTROINTESTINAL:   (  )abdominal or epigastric pain.  (  ) nausea, vomiting, or hematemesis;   (   ) diarrhea or constipation.   GENITOURINARY:   (    ) dysuria, frequency or hematuria  NEUROLOGICAL:  (   ) numbness or weakness   All other review of systems is negative unless indicated above    Vital Signs Last 24 Hrs  T(C): 36.6 (30 Jul 2019 04:07), Max: 36.8 (29 Jul 2019 12:18)  T(F): 97.9 (30 Jul 2019 04:07), Max: 98.2 (29 Jul 2019 12:18)  HR: 69 (30 Jul 2019 06:00) (68 - 74)  BP: 106/62 (30 Jul 2019 06:00) (106/62 - 122/64)  BP(mean): --  RR: 18 (30 Jul 2019 04:07) (18 - 19)  SpO2: 100% (30 Jul 2019 04:07) (100% - 100%)    I&O's Summary    29 Jul 2019 07:01  -  30 Jul 2019 07:00  --------------------------------------------------------  IN: 350 mL / OUT: 750 mL / NET: -400 mL        CAPILLARY BLOOD GLUCOSE      POCT Blood Glucose.: 303 mg/dL (30 Jul 2019 07:32)  POCT Blood Glucose.: 296 mg/dL (29 Jul 2019 21:11)  POCT Blood Glucose.: 172 mg/dL (29 Jul 2019 17:16)  POCT Blood Glucose.: 289 mg/dL (29 Jul 2019 11:34)      PHYSICAL EXAM:     Constitutional:  ( x ) NAD,   (  x )awake and alert  HEENT: PERR, EOMI,    Neck: Soft and supple, No LAD, No JVD  Respiratory:  (  x  Breath sounds are clear bilaterally,    ( x  ) wheezing, rales or rhonchi  Cardiovascular:     (  x )S1 and S2, regular rate and rhythm, no Murmurs, gallops or rubs  Gastrointestinal:  (   )Bowel Sounds present, soft,   (  )nontender, nondistended,    Extremities:    (  ) peripheral edema  Vascular: 2+ peripheral pulses  Neurological:    (    )A/O x 3,   (  ) focal deficits  Musculoskeletal:    (   )  normal strength b/l upper  (     ) normal  lower extremities  Skin: No rashes  MEDICATIONS:  MEDICATIONS  (STANDING):  acetaminophen  IVPB .. 1000 milliGRAM(s) IV Intermittent once  aspirin enteric coated 81 milliGRAM(s) Oral daily  atorvastatin 40 milliGRAM(s) Oral at bedtime  chlorhexidine 2% Cloths 1 Application(s) Topical daily  clopidogrel Tablet 75 milliGRAM(s) Oral daily  dextrose 5%. 1000 milliLiter(s) (50 mL/Hr) IV Continuous <Continuous>  dextrose 50% Injectable 12.5 Gram(s) IV Push once  dextrose 50% Injectable 25 Gram(s) IV Push once  dextrose 50% Injectable 25 Gram(s) IV Push once  digoxin     Tablet 0.125 milliGRAM(s) Oral daily  docusate sodium 100 milliGRAM(s) Oral two times a day  furosemide   Injectable 60 milliGRAM(s) IV Push two times a day  hydrALAZINE 50 milliGRAM(s) Oral every 8 hours  insulin glargine Injectable (LANTUS) 50 Unit(s) SubCutaneous at bedtime  insulin lispro (HumaLOG) corrective regimen sliding scale   SubCutaneous three times a day before meals  insulin lispro (HumaLOG) corrective regimen sliding scale   SubCutaneous at bedtime  insulin lispro Injectable (HumaLOG) 26 Unit(s) SubCutaneous three times a day before meals  isosorbide   dinitrate Tablet (ISORDIL) 30 milliGRAM(s) Oral three times a day  levothyroxine 50 MICROGram(s) Oral daily  lidocaine   Patch 1 Patch Transdermal daily  melatonin 3 milliGRAM(s) Oral at bedtime  metoprolol tartrate 12.5 milliGRAM(s) Oral two times a day  montelukast 10 milliGRAM(s) Oral daily  pantoprazole    Tablet 40 milliGRAM(s) Oral before breakfast  polyethylene glycol 3350 17 Gram(s) Oral daily  senna 2 Tablet(s) Oral at bedtime      LABS: All Labs Reviewed:                        9.3    7.1   )-----------( 324      ( 30 Jul 2019 08:28 )             27.9     07-30    138  |  102  |  54<H>  ----------------------------<  277<H>  3.9   |  21<L>  |  1.69<H>    Ca    9.5      30 Jul 2019 08:28            Blood Culture:   Urine Culture      RADIOLOGY/EKG:    ASSESSMENT AND PLAN:  CHF/CAD/status post mitral valve clip condition remained unchanged as per cardiology continue IV Lasix they did not advised to change it to oral Lasix and possible starting of Aldactone by end of this week.   discussed with Medical team to contact cardiology about her plan in detailv care treatment possible placement in the next 7-10 days home versus rehab.  Follow-up cardiology recommendation if Lasix IV can change to oral  DVT PPX:    ADVANCED DIRECTIVE:    DISPOSITION:

## 2019-07-30 NOTE — PROGRESS NOTE ADULT - SUBJECTIVE AND OBJECTIVE BOX
Dr. Muhammad  Office [phone]  Cell [phone]  [facility]  Cell [phone]      Patient is a 73y old  Female who presents with a chief complaint of Hypoxia, SOB (30 Jul 2019 10:27)      Patient seen and examined at bedside. No chest pain/sob    VITALS:  T(F): 98.5 (07-30-19 @ 12:20), Max: 98.5 (07-30-19 @ 12:20)  HR: 68 (07-30-19 @ 12:20)  BP: 111/58 (07-30-19 @ 12:20)  RR: 18 (07-30-19 @ 12:20)  SpO2: 95% (07-30-19 @ 12:20)  Wt(kg): --    07-29 @ 07:01  -  07-30 @ 07:00  --------------------------------------------------------  IN: 350 mL / OUT: 750 mL / NET: -400 mL    07-30 @ 07:01  -  07-30 @ 13:33  --------------------------------------------------------  IN: 0 mL / OUT: 500 mL / NET: -500 mL          PHYSICAL EXAM:  Constitutional: NAD  Neck: No JVD  Respiratory: CTAB, no wheezes, rales or rhonchi  Cardiovascular: S1, S2, RRR  Gastrointestinal: BS+, soft, NT/ND  Extremities: No peripheral edema    Hospital Medications:   MEDICATIONS  (STANDING):  acetaminophen  IVPB .. 1000 milliGRAM(s) IV Intermittent once  aspirin enteric coated 81 milliGRAM(s) Oral daily  atorvastatin 40 milliGRAM(s) Oral at bedtime  chlorhexidine 2% Cloths 1 Application(s) Topical daily  clopidogrel Tablet 75 milliGRAM(s) Oral daily  dextrose 5%. 1000 milliLiter(s) (50 mL/Hr) IV Continuous <Continuous>  dextrose 50% Injectable 12.5 Gram(s) IV Push once  dextrose 50% Injectable 25 Gram(s) IV Push once  dextrose 50% Injectable 25 Gram(s) IV Push once  digoxin     Tablet 0.125 milliGRAM(s) Oral daily  docusate sodium 100 milliGRAM(s) Oral two times a day  furosemide   Injectable 60 milliGRAM(s) IV Push two times a day  hydrALAZINE 50 milliGRAM(s) Oral every 8 hours  insulin glargine Injectable (LANTUS) 56 Unit(s) SubCutaneous at bedtime  insulin lispro (HumaLOG) corrective regimen sliding scale   SubCutaneous three times a day before meals  insulin lispro (HumaLOG) corrective regimen sliding scale   SubCutaneous at bedtime  insulin lispro Injectable (HumaLOG) 28 Unit(s) SubCutaneous three times a day before meals  isosorbide   dinitrate Tablet (ISORDIL) 30 milliGRAM(s) Oral three times a day  levothyroxine 50 MICROGram(s) Oral daily  lidocaine   Patch 1 Patch Transdermal daily  melatonin 3 milliGRAM(s) Oral at bedtime  metoprolol tartrate 12.5 milliGRAM(s) Oral two times a day  montelukast 10 milliGRAM(s) Oral daily  pantoprazole    Tablet 40 milliGRAM(s) Oral before breakfast  polyethylene glycol 3350 17 Gram(s) Oral daily  senna 2 Tablet(s) Oral at bedtime  spironolactone 12.5 milliGRAM(s) Oral daily      LABS:  07-30    138  |  102  |  54<H>  ----------------------------<  277<H>  3.9   |  21<L>  |  1.69<H>    Ca    9.5      30 Jul 2019 08:28      Creatinine Trend: 1.69 <--, 1.61 <--, 1.62 <--, 1.63 <--, 1.62 <--, 1.68 <--, 1.58 <--, 1.48 <--, 1.53 <--                                9.3    7.1   )-----------( 324      ( 30 Jul 2019 08:28 )             27.9     Urine Studies:  Urinalysis - [07-09-19 @ 13:28]      Color Yellow / Appearance Clear / SG 1.012 / pH 6.0      Gluc Negative / Ketone Negative  / Bili Negative / Urobili Negative       Blood Trace / Protein Trace / Leuk Est Negative / Nitrite Negative      RBC 1 / WBC 1 / Hyaline 0 / Gran  / Sq Epi  / Non Sq Epi 0 / Bacteria Moderate      Iron 42, TIBC 348, %sat 12      [07-05-19 @ 22:32]  Ferritin 130      [07-05-19 @ 22:32]  .4 (Ca --)      [04-25-19 @ 05:45]   --  PTH 43.55 (Ca --)      [09-10-18 @ 07:15]   --  PTH 36.60 (Ca --)      [09-08-18 @ 03:15]   --  Vitamin D (25OH) 25.9      [05-01-19 @ 04:00]  HbA1c 7.4      [07-05-19 @ 22:32]  TSH 2.00      [07-05-19 @ 22:32]  Lipid: chol 148, , HDL 35,       [06-01-19 @ 08:00]        RADIOLOGY & ADDITIONAL STUDIES:

## 2019-07-30 NOTE — PROGRESS NOTE ADULT - ATTENDING COMMENTS
73 year old woman with history of remote CABG, MitraClip 6 weeks ago sent to ER from Rehab complaining of shortness of breath and lower extremity edema and hypoxemia. On exam mild volume overload, improving. No further recurrent SVT. If conclude episodes are atrial tachycardia amiodarone likely best option for management. Responds to high dose IV furosemide, but volume overload recurs if switch to oral furosemide. Thus would switch to high dose torsemide and aldosterone antagonist and monitor volume status closely.

## 2019-07-30 NOTE — PROGRESS NOTE ADULT - SUBJECTIVE AND OBJECTIVE BOX
Patient seen and examined at bedside.    Overnight Events: No acute events overnight.       REVIEW OF SYSTEMS:  Constitutional:     [ ] negative [ ] fevers [ ] chills [ ] weight loss [ ] weight gain  HEENT:                  [ ] negative [ ] dry eyes [ ] eye irritation [ ] postnasal drip [ ] nasal congestion  CV:                         [ ] negative  [ ] chest pain [ ] orthopnea [ ] palpitations [ ] murmur  Resp:                     [ ] negative [ ] cough [ ] shortness of breath [ x] dyspnea [ ] wheezing [ ] sputum [ ]hemoptysis  GI:                          [ ] negative [ ] nausea [ ] vomiting [ ] diarrhea [ ] constipation [ ] abd pain [ ] dysphagia   :                        [ ] negative [ ] dysuria [ ] nocturia [ ] hematuria [ ] increased urinary frequency  Musculoskeletal: [ ] negative [ ] back pain [ ] myalgias [ ] arthralgias [ ] fracture  Skin:                       [ ] negative [ ] rash [ ] itch  Neurological:        [ ] negative [ ] headache [ ] dizziness [ ] syncope [ ] weakness [ ] numbness  Psychiatric:           [ ] negative [ ] anxiety [ ] depression  Endocrine:            [ ] negative [ ] diabetes [ ] thyroid problem  Heme/Lymph:      [ ] negative [ ] anemia [ ] bleeding problem  Allergic/Immune: [ ] negative [ ] itchy eyes [ ] nasal discharge [ ] hives [ ] angioedema    [x ] All other systems negative  [ ] Unable to assess ROS due to    Current Meds:  acetaminophen   Tablet .. 650 milliGRAM(s) Oral every 6 hours PRN  acetaminophen  IVPB .. 1000 milliGRAM(s) IV Intermittent once  aspirin enteric coated 81 milliGRAM(s) Oral daily  atorvastatin 40 milliGRAM(s) Oral at bedtime  chlorhexidine 2% Cloths 1 Application(s) Topical daily  clopidogrel Tablet 75 milliGRAM(s) Oral daily  dextrose 40% Gel 15 Gram(s) Oral once PRN  dextrose 5%. 1000 milliLiter(s) IV Continuous <Continuous>  dextrose 50% Injectable 12.5 Gram(s) IV Push once  dextrose 50% Injectable 25 Gram(s) IV Push once  dextrose 50% Injectable 25 Gram(s) IV Push once  digoxin     Tablet 0.125 milliGRAM(s) Oral daily  docusate sodium 100 milliGRAM(s) Oral two times a day  furosemide   Injectable 60 milliGRAM(s) IV Push two times a day  glucagon  Injectable 1 milliGRAM(s) IntraMuscular once PRN  hydrALAZINE 50 milliGRAM(s) Oral every 8 hours  insulin glargine Injectable (LANTUS) 50 Unit(s) SubCutaneous at bedtime  insulin lispro (HumaLOG) corrective regimen sliding scale   SubCutaneous three times a day before meals  insulin lispro (HumaLOG) corrective regimen sliding scale   SubCutaneous at bedtime  insulin lispro Injectable (HumaLOG) 26 Unit(s) SubCutaneous three times a day before meals  isosorbide   dinitrate Tablet (ISORDIL) 30 milliGRAM(s) Oral three times a day  levothyroxine 50 MICROGram(s) Oral daily  lidocaine   Patch 1 Patch Transdermal daily  melatonin 3 milliGRAM(s) Oral at bedtime  metoprolol tartrate 12.5 milliGRAM(s) Oral two times a day  montelukast 10 milliGRAM(s) Oral daily  pantoprazole    Tablet 40 milliGRAM(s) Oral before breakfast  polyethylene glycol 3350 17 Gram(s) Oral daily  senna 2 Tablet(s) Oral at bedtime      PAST MEDICAL & SURGICAL HISTORY:  GERD (gastroesophageal reflux disease)  CAD (coronary artery disease): s/p CABG  Hypothyroidism  Hyperlipidemia  Diabetes mellitus, type 2  S/P CABG (coronary artery bypass graft)      Vitals:  T(F): 97.9 (07-30), Max: 98.2 (07-29)  HR: 69 (07-30) (68 - 74)  BP: 106/62 (07-30) (106/62 - 122/64)  RR: 18 (07-30)  SpO2: 100% (07-30)  I&O's Summary    29 Jul 2019 07:01  -  30 Jul 2019 07:00  --------------------------------------------------------  IN: 350 mL / OUT: 750 mL / NET: -400 mL        Physical Exam:  Appearance: No acute distress; well appearing  Eyes: PERRL, EOMI, pink conjunctiva  HENT: Normal oral mucosa  Cardiovascular: RRR, S1, S2, no murmurs, rubs, or gallops; no edema; elevated JVD  Respiratory: decreased BS at the bases.   Gastrointestinal: soft, non-tender, non-distended with normal bowel sounds  Musculoskeletal: No clubbing; no joint deformity   Neurologic: Non-focal  Lymphatic: No lymphadenopathy  Psychiatry: AAOx3, mood & affect appropriate  Skin: No rashes, ecchymoses, or cyanosis                          9.3    7.1   )-----------( 324      ( 30 Jul 2019 08:28 )             27.9     07-30    138  |  102  |  54<H>  ----------------------------<  277<H>  3.9   |  21<L>  |  1.69<H>    Ca    9.5      30 Jul 2019 08:28          New ECG(s): Personally reviewed    Echo:  < from: TTE with Doppler (w/Cont) (07.09.19 @ 07:49) >  Conclusions:  1. Severe global left ventricular systolic dysfunction.  Endocardial visualization enhanced with intravenous  injection of Ultrasonic Enhancing Agent (Definity).  2. The right ventricle is not well visualized; grossly  normal right ventricular systolic function.  3. Estimated right ventricular systolic pressure equals 67  mm Hg, assuming right atrial pressure equals 15 mm Hg,  consistent with severe pulmonary hypertension.  4. Normal tricuspid valve. Mild-moderate tricuspid  regurgitation.    < end of copied text >        Interpretation of Telemetry: Sinus 60-70s

## 2019-07-30 NOTE — GOALS OF CARE CONVERSATION - PERSONAL ADVANCE DIRECTIVE - CONVERSATION DETAILS
Conversation held with the HCP, whom the patient defers all decision making to.  Elsa would like to bring the patient home.  Regarding advanced directives, Elsa would like to give her mom " a chance" with medical treatment and address reversible causes including an attempt at resuscitation and a trial of intubation if needed.  I recommended home care with [facility].  Discussed with case management.  Message left with np for covering NP    Palliative Medicine Service      The patient's symptoms are well controlled.  The patient's care plan has been delineated.  Thank you for the consult, feel free to reconsult when clinical needs arise        Eben Borges MD   of Geriatric and Palliative Medicine  [facility]  Please contact the following number for clinical matters: [phone]

## 2019-07-30 NOTE — PROGRESS NOTE ADULT - SUBJECTIVE AND OBJECTIVE BOX
Endocrinology Attending Covering for Dr. Banks      Chief complaint  Patient is a 73y old  Female who presents with a chief complaint of Hypoxia, SOB (30 Jul 2019 10:11)   Review of systems  Patient in bed, looks comfortable, no fever,  had no hypoglycemia.    Labs and Fingersticks  CAPILLARY BLOOD GLUCOSE      POCT Blood Glucose.: 303 mg/dL (30 Jul 2019 07:32)  POCT Blood Glucose.: 296 mg/dL (29 Jul 2019 21:11)  POCT Blood Glucose.: 172 mg/dL (29 Jul 2019 17:16)  POCT Blood Glucose.: 289 mg/dL (29 Jul 2019 11:34)      Anion Gap, Serum: 15 (07-30 @ 08:28)  Anion Gap, Serum: 14 (07-29 @ 07:10)      Calcium, Total Serum: 9.5 (07-30 @ 08:28)  Calcium, Total Serum: 9.4 (07-29 @ 07:10)          07-30    138  |  102  |  54<H>  ----------------------------<  277<H>  3.9   |  21<L>  |  1.69<H>    Ca    9.5      30 Jul 2019 08:28                          9.3    7.1   )-----------( 324      ( 30 Jul 2019 08:28 )             27.9     Medications  MEDICATIONS  (STANDING):  acetaminophen  IVPB .. 1000 milliGRAM(s) IV Intermittent once  aspirin enteric coated 81 milliGRAM(s) Oral daily  atorvastatin 40 milliGRAM(s) Oral at bedtime  chlorhexidine 2% Cloths 1 Application(s) Topical daily  clopidogrel Tablet 75 milliGRAM(s) Oral daily  dextrose 5%. 1000 milliLiter(s) (50 mL/Hr) IV Continuous <Continuous>  dextrose 50% Injectable 12.5 Gram(s) IV Push once  dextrose 50% Injectable 25 Gram(s) IV Push once  dextrose 50% Injectable 25 Gram(s) IV Push once  digoxin     Tablet 0.125 milliGRAM(s) Oral daily  docusate sodium 100 milliGRAM(s) Oral two times a day  furosemide   Injectable 60 milliGRAM(s) IV Push two times a day  hydrALAZINE 50 milliGRAM(s) Oral every 8 hours  insulin glargine Injectable (LANTUS) 50 Unit(s) SubCutaneous at bedtime  insulin lispro (HumaLOG) corrective regimen sliding scale   SubCutaneous three times a day before meals  insulin lispro (HumaLOG) corrective regimen sliding scale   SubCutaneous at bedtime  insulin lispro Injectable (HumaLOG) 26 Unit(s) SubCutaneous three times a day before meals  isosorbide   dinitrate Tablet (ISORDIL) 30 milliGRAM(s) Oral three times a day  levothyroxine 50 MICROGram(s) Oral daily  lidocaine   Patch 1 Patch Transdermal daily  melatonin 3 milliGRAM(s) Oral at bedtime  metoprolol tartrate 12.5 milliGRAM(s) Oral two times a day  montelukast 10 milliGRAM(s) Oral daily  pantoprazole    Tablet 40 milliGRAM(s) Oral before breakfast  polyethylene glycol 3350 17 Gram(s) Oral daily  senna 2 Tablet(s) Oral at bedtime      Physical Exam  General: Patient comfortable in bed  Vital Signs Last 12 Hrs  T(F): 97.9 (07-30-19 @ 04:07), Max: 97.9 (07-30-19 @ 04:07)  HR: 69 (07-30-19 @ 06:00) (68 - 74)  BP: 106/62 (07-30-19 @ 06:00) (106/62 - 118/68)  BP(mean): --  RR: 18 (07-30-19 @ 04:07) (18 - 18)  SpO2: 100% (07-30-19 @ 04:07) (100% - 100%)  Neck: No palpable thyroid nodules.  CVS: S1S2, No murmurs  Respiratory: No wheezing, no crepitations  GI: Abdomen soft, bowel sounds positive  Musculoskeletal:  edema lower extremities.   Skin: No skin rashes, no ecchymosis    Diagnostics

## 2019-07-30 NOTE — PROGRESS NOTE ADULT - ASSESSMENT
Assessment  DMT2: 73y Female with DM T2 with hyperglycemia on insulin, blood sugars Trending up  eating meals,  non compliant with low carb diet.  CAD: S/P cabgOn medications, stable, monitored.  Hypothyroidism:  On synthroid 50  mcg po daily, asymptomatic.  HTN: Controlled, On med.  HLD; on statin  CKD: Monitor labs/BMP,   ESRD: On hemodialysis, Monitor labs/BMP      Plan:   DMT2:  Increase   Lantus to 56 units qhs, Continue  Humalog to 28  units before each meal in addition to correction scale coverage, monitor FS will FU  Patient counseled for compliance with consistant low carb diet.  CAD: S/P cabg On medications, stable, monitored.  Hypothyroidism:  On synthroid 50  mcg po daily, asymptomatic. F/U tsh as outpatient  HTN: Continue treatment, monitoring, Primary team FU  HLD; on statin  CKD: Continue monitoring labs, renal FU  Thank you for consult will F/U  case discussed with NP  erika Woodard MD  [phone]

## 2019-07-31 LAB
ANION GAP SERPL CALC-SCNC: 14 MMOL/L — SIGNIFICANT CHANGE UP (ref 5–17)
BUN SERPL-MCNC: 57 MG/DL — HIGH (ref 7–23)
CALCIUM SERPL-MCNC: 9.8 MG/DL — SIGNIFICANT CHANGE UP (ref 8.4–10.5)
CHLORIDE SERPL-SCNC: 102 MMOL/L — SIGNIFICANT CHANGE UP (ref 96–108)
CO2 SERPL-SCNC: 23 MMOL/L — SIGNIFICANT CHANGE UP (ref 22–31)
CREAT SERPL-MCNC: 1.69 MG/DL — HIGH (ref 0.5–1.3)
D DIMER BLD IA.RAPID-MCNC: 241 NG/ML DDU — HIGH
GLUCOSE BLDC GLUCOMTR-MCNC: 132 MG/DL — HIGH (ref 70–99)
GLUCOSE BLDC GLUCOMTR-MCNC: 177 MG/DL — HIGH (ref 70–99)
GLUCOSE BLDC GLUCOMTR-MCNC: 219 MG/DL — HIGH (ref 70–99)
GLUCOSE BLDC GLUCOMTR-MCNC: 329 MG/DL — HIGH (ref 70–99)
GLUCOSE SERPL-MCNC: 133 MG/DL — HIGH (ref 70–99)
MAGNESIUM SERPL-MCNC: 2 MG/DL — SIGNIFICANT CHANGE UP (ref 1.6–2.6)
POTASSIUM SERPL-MCNC: 3.4 MMOL/L — LOW (ref 3.5–5.3)
POTASSIUM SERPL-SCNC: 3.4 MMOL/L — LOW (ref 3.5–5.3)
SODIUM SERPL-SCNC: 139 MMOL/L — SIGNIFICANT CHANGE UP (ref 135–145)
TROPONIN T, HIGH SENSITIVITY RESULT: 137 NG/L — HIGH (ref 0–51)

## 2019-07-31 PROCEDURE — 71045 X-RAY EXAM CHEST 1 VIEW: CPT | Mod: 26

## 2019-07-31 PROCEDURE — 99233 SBSQ HOSP IP/OBS HIGH 50: CPT | Mod: GC

## 2019-07-31 PROCEDURE — 93010 ELECTROCARDIOGRAM REPORT: CPT

## 2019-07-31 RX ORDER — POTASSIUM CHLORIDE 20 MEQ
40 PACKET (EA) ORAL ONCE
Refills: 0 | Status: COMPLETED | OUTPATIENT
Start: 2019-07-31 | End: 2019-07-31

## 2019-07-31 RX ORDER — INSULIN GLARGINE 100 [IU]/ML
60 INJECTION, SOLUTION SUBCUTANEOUS AT BEDTIME
Refills: 0 | Status: DISCONTINUED | OUTPATIENT
Start: 2019-07-31 | End: 2019-08-02

## 2019-07-31 RX ORDER — INSULIN LISPRO 100/ML
30 VIAL (ML) SUBCUTANEOUS
Refills: 0 | Status: DISCONTINUED | OUTPATIENT
Start: 2019-07-31 | End: 2019-08-02

## 2019-07-31 RX ADMIN — CLOPIDOGREL BISULFATE 75 MILLIGRAM(S): 75 TABLET, FILM COATED ORAL at 09:42

## 2019-07-31 RX ADMIN — Medication 30 UNIT(S): at 17:11

## 2019-07-31 RX ADMIN — Medication 28 UNIT(S): at 12:14

## 2019-07-31 RX ADMIN — LIDOCAINE 1 PATCH: 4 CREAM TOPICAL at 09:46

## 2019-07-31 RX ADMIN — ISOSORBIDE DINITRATE 30 MILLIGRAM(S): 5 TABLET ORAL at 05:03

## 2019-07-31 RX ADMIN — SENNA PLUS 2 TABLET(S): 8.6 TABLET ORAL at 21:19

## 2019-07-31 RX ADMIN — Medication 40 MILLIEQUIVALENT(S): at 09:42

## 2019-07-31 RX ADMIN — ISOSORBIDE DINITRATE 30 MILLIGRAM(S): 5 TABLET ORAL at 21:21

## 2019-07-31 RX ADMIN — CHLORHEXIDINE GLUCONATE 1 APPLICATION(S): 213 SOLUTION TOPICAL at 21:19

## 2019-07-31 RX ADMIN — ATORVASTATIN CALCIUM 40 MILLIGRAM(S): 80 TABLET, FILM COATED ORAL at 21:19

## 2019-07-31 RX ADMIN — LIDOCAINE 1 PATCH: 4 CREAM TOPICAL at 21:20

## 2019-07-31 RX ADMIN — Medication 3 MILLIGRAM(S): at 21:21

## 2019-07-31 RX ADMIN — Medication 12.5 MILLIGRAM(S): at 17:12

## 2019-07-31 RX ADMIN — MONTELUKAST 10 MILLIGRAM(S): 4 TABLET, CHEWABLE ORAL at 09:43

## 2019-07-31 RX ADMIN — Medication 100 MILLIGRAM(S): at 17:12

## 2019-07-31 RX ADMIN — Medication 1: at 07:49

## 2019-07-31 RX ADMIN — Medication 50 MILLIGRAM(S): at 21:21

## 2019-07-31 RX ADMIN — Medication 12.5 MILLIGRAM(S): at 05:03

## 2019-07-31 RX ADMIN — INSULIN GLARGINE 60 UNIT(S): 100 INJECTION, SOLUTION SUBCUTANEOUS at 21:18

## 2019-07-31 RX ADMIN — Medication 2: at 12:14

## 2019-07-31 RX ADMIN — SPIRONOLACTONE 12.5 MILLIGRAM(S): 25 TABLET, FILM COATED ORAL at 05:54

## 2019-07-31 RX ADMIN — Medication 100 MILLIGRAM(S): at 05:04

## 2019-07-31 RX ADMIN — Medication 50 MICROGRAM(S): at 05:04

## 2019-07-31 RX ADMIN — Medication 28 UNIT(S): at 07:49

## 2019-07-31 RX ADMIN — Medication 81 MILLIGRAM(S): at 09:42

## 2019-07-31 RX ADMIN — Medication 0.12 MILLIGRAM(S): at 05:03

## 2019-07-31 RX ADMIN — PANTOPRAZOLE SODIUM 40 MILLIGRAM(S): 20 TABLET, DELAYED RELEASE ORAL at 05:55

## 2019-07-31 RX ADMIN — Medication 50 MILLIGRAM(S): at 05:04

## 2019-07-31 RX ADMIN — LIDOCAINE 1 PATCH: 4 CREAM TOPICAL at 07:00

## 2019-07-31 RX ADMIN — POLYETHYLENE GLYCOL 3350 17 GRAM(S): 17 POWDER, FOR SOLUTION ORAL at 09:43

## 2019-07-31 RX ADMIN — ISOSORBIDE DINITRATE 30 MILLIGRAM(S): 5 TABLET ORAL at 14:31

## 2019-07-31 RX ADMIN — CHLORHEXIDINE GLUCONATE 1 APPLICATION(S): 213 SOLUTION TOPICAL at 07:49

## 2019-07-31 RX ADMIN — Medication 2: at 21:20

## 2019-07-31 RX ADMIN — Medication 100 MILLIGRAM(S): at 05:54

## 2019-07-31 RX ADMIN — Medication 50 MILLIGRAM(S): at 14:31

## 2019-07-31 NOTE — PROGRESS NOTE ADULT - ASSESSMENT
74 yo woman with PMHx of HTN, T2DM (A1c 7.4%), CAD s/p CABG (LIMA to LAD, SVG to OM, SVG to PDA 2014 at [facility]), non-dilated ICM (EF 20-25%), severe mitral regurgitation s/p mitral clip (6/13/19 Dr. Newman), severe pulm HTN, CKD, hypothyroidism, who is presenting to ED after [facility] Of note, pt was recently discharged on 6/19 after having mitral clip procedure completed. She initially required BiPAP with improvement in her respiratory status following diuresis. Initiated on vasopressors and inotropes in CCU, which were subsequently weaned off. Infectious work up was negative.    #HFrEF likely 2/2 ICM with MR s/p alonso clip  - Agree with torsemide 100 mg daily for now. Would aim for this on d/c as well.    - Check daily weights, Is and Os, and daily lactates.   - C/w hydralazine 50 mg TID and isordil 30 TID  - c/w spironolactone, and increase dose to 25 mg.   - No further cardiac testing at this time.      #CAD  - C/w ASA and statin     #SVT likely Atrial tach v Atrial flutter   - Amio initially not given due to elevated LFTs likely in the setting of congestion.   - recheck LFTs.   - Monitor on tele.   - C/w Dig and metoprolol at current doses.   - Check dig level   - If pt has SVT again can consider amio.       Surendra Marshall MD  Cardiology Fellow - PGY 5  Text or Call: [phone]  For all New Consults and Questions:  [email or web address]   Login: [employer]

## 2019-07-31 NOTE — PROGRESS NOTE ADULT - ASSESSMENT
Assessment  DMT2: 73y Female with DM T2 with hyperglycemia on insulin, blood sugars still high  eating meals,  non compliant with low carb diet.  CAD: S/P cabgOn medications, stable, monitored.  Hypothyroidism:  On synthroid 50  mcg po daily, asymptomatic.  HTN: Controlled, On med.  HLD; on statin  CKD: Monitor labs/BMP,   ESRD: On hemodialysis, Monitor labs/BMP      Plan:   DMT2:  Increase   Lantus to 60 units qhs, Continue  Humalog to 30  units before each meal in addition to correction scale coverage, monitor FS will FU  Patient counseled for compliance with consistent low carb diet.  CAD: S/P cabg On medications, stable, monitored.  Hypothyroidism:  On synthroid 50  mcg po daily, asymptomatic. F/U tsh as outpatient  HTN: Continue treatment, monitoring, Primary team FU  HLD; on statin  CKD: Continue monitoring labs, renal FU  Thank you for consult will F/U  erika Woodard MD  [phone]

## 2019-07-31 NOTE — PROGRESS NOTE ADULT - SUBJECTIVE AND OBJECTIVE BOX
Dr. Muhammad  Office [phone]  Cell [phone]  [facility]  Cell [phone]      Patient is a 73y old  Female who presents with a chief complaint of Hypoxia, SOB (31 Jul 2019 10:37)      Patient seen and examined at bedside. No chest pain/sob    VITALS:  T(F): 97.9 (07-31-19 @ 11:51), Max: 98.1 (07-31-19 @ 04:34)  HR: 67 (07-31-19 @ 14:31)  BP: 124/61 (07-31-19 @ 14:31)  RR: 16 (07-31-19 @ 14:31)  SpO2: 100% (07-31-19 @ 14:31)  Wt(kg): --    07-30 @ 07:01  -  07-31 @ 07:00  --------------------------------------------------------  IN: 240 mL / OUT: 500 mL / NET: -260 mL    07-31 @ 07:01  -  07-31 @ 19:25  --------------------------------------------------------  IN: 120 mL / OUT: 450 mL / NET: -330 mL          PHYSICAL EXAM:  Constitutional: NAD  Neck: No JVD  Respiratory: CTAB, no wheezes, rales or rhonchi  Cardiovascular: S1, S2, RRR  Gastrointestinal: BS+, soft, NT/ND  Extremities: No peripheral edema    Hospital Medications:   MEDICATIONS  (STANDING):  acetaminophen  IVPB .. 1000 milliGRAM(s) IV Intermittent once  aspirin enteric coated 81 milliGRAM(s) Oral daily  atorvastatin 40 milliGRAM(s) Oral at bedtime  chlorhexidine 2% Cloths 1 Application(s) Topical daily  clopidogrel Tablet 75 milliGRAM(s) Oral daily  dextrose 5%. 1000 milliLiter(s) (50 mL/Hr) IV Continuous <Continuous>  dextrose 50% Injectable 12.5 Gram(s) IV Push once  dextrose 50% Injectable 25 Gram(s) IV Push once  dextrose 50% Injectable 25 Gram(s) IV Push once  digoxin     Tablet 0.125 milliGRAM(s) Oral daily  docusate sodium 100 milliGRAM(s) Oral two times a day  hydrALAZINE 50 milliGRAM(s) Oral every 8 hours  insulin glargine Injectable (LANTUS) 60 Unit(s) SubCutaneous at bedtime  insulin lispro (HumaLOG) corrective regimen sliding scale   SubCutaneous three times a day before meals  insulin lispro (HumaLOG) corrective regimen sliding scale   SubCutaneous at bedtime  insulin lispro Injectable (HumaLOG) 30 Unit(s) SubCutaneous three times a day before meals  isosorbide   dinitrate Tablet (ISORDIL) 30 milliGRAM(s) Oral three times a day  levothyroxine 50 MICROGram(s) Oral daily  lidocaine   Patch 1 Patch Transdermal daily  melatonin 3 milliGRAM(s) Oral at bedtime  metoprolol tartrate 12.5 milliGRAM(s) Oral two times a day  montelukast 10 milliGRAM(s) Oral daily  pantoprazole    Tablet 40 milliGRAM(s) Oral before breakfast  polyethylene glycol 3350 17 Gram(s) Oral daily  senna 2 Tablet(s) Oral at bedtime  spironolactone 12.5 milliGRAM(s) Oral daily  torsemide 100 milliGRAM(s) Oral daily      LABS:  07-31    139  |  102  |  57<H>  ----------------------------<  133<H>  3.4<L>   |  23  |  1.69<H>    Ca    9.8      31 Jul 2019 07:45  Mg     2.0     07-31      Creatinine Trend: 1.69 <--, 1.69 <--, 1.61 <--, 1.62 <--, 1.63 <--, 1.62 <--, 1.68 <--, 1.58 <--                                9.3    7.1   )-----------( 324      ( 30 Jul 2019 08:28 )             27.9     Urine Studies:  Urinalysis - [07-09-19 @ 13:28]      Color Yellow / Appearance Clear / SG 1.012 / pH 6.0      Gluc Negative / Ketone Negative  / Bili Negative / Urobili Negative       Blood Trace / Protein Trace / Leuk Est Negative / Nitrite Negative      RBC 1 / WBC 1 / Hyaline 0 / Gran  / Sq Epi  / Non Sq Epi 0 / Bacteria Moderate      Iron 42, TIBC 348, %sat 12      [07-05-19 @ 22:32]  Ferritin 130      [07-05-19 @ 22:32]  .4 (Ca --)      [04-25-19 @ 05:45]   --  PTH 43.55 (Ca --)      [09-10-18 @ 07:15]   --  PTH 36.60 (Ca --)      [09-08-18 @ 03:15]   --  Vitamin D (25OH) 25.9      [05-01-19 @ 04:00]  HbA1c 7.4      [07-05-19 @ 22:32]  TSH 2.00      [07-05-19 @ 22:32]  Lipid: chol 148, , HDL 35,       [06-01-19 @ 08:00]        RADIOLOGY & ADDITIONAL STUDIES:

## 2019-07-31 NOTE — CHART NOTE - NSCHARTNOTEFT_GEN_A_CORE
Pt with c/o 8/10 dull CP. Reports pain noted at epigastric region.  /61  HR 67  RR 16  SpO2 100% NC, afebrile.  EKG no changes.  Troponin 137, decreased from previous trop. D dimer 124.    Check dig level, urgent CXR, cont to monitor.     [id number]

## 2019-07-31 NOTE — PROGRESS NOTE ADULT - ASSESSMENT
Pt is a 74 yo woman with PMHx of HTN, T2DM, CAD s/p CABG (LIMA to LAD, SVG to OM, SVG to PDA 2014 at [facility]), non-dilated ICM (EF 20-25%), severe mitral regurgitation s/p mitral clip (6/13/19 Dr. Newman), severe pulm HTN, CKD, hypothyroidism, who is presenting to ED after experiencing worsening SOB      A/P:  MERVIN  Likely sec to cardiogenic shock  Renal function has improved and stable  PT non -oliguric  continue lasix per cardiology  Optimize glucose control  Monitor renal function   Avoid further nephrotoxics, NSAIDS RCA    CKD stage 4:  baseline Scr 1.8-2.0  Renal function fluctuates sec to CHF  Monitor renal function at present    SOB:  in setting of HF  Continue diuretics per cardiology    Acidosis   sec to RF  Improved  mOnitor serum Co2 at present    Hypokalemia:  Likely sec to diuretics  Supplement KCL 40meq one dose

## 2019-07-31 NOTE — PROGRESS NOTE ADULT - SUBJECTIVE AND OBJECTIVE BOX
CHIEF COMPLAINT:    SUBJECTIVE:     REVIEW OF SYSTEMS:    CONSTITUTIONAL: (  )  weakness,  (  ) fevers or chills  EYES/ENT: (  )visual changes;     NECK: (  ) pain or stiffness  RESPIRATORY:   (  )cough, wheezing, hemoptysis;  (  ) shortness of breath  CARDIOVASCULAR:  (  )chest pain or palpitations  GASTROINTESTINAL:   (  )abdominal or epigastric pain.  (  ) nausea, vomiting, or hematemesis;   (   ) diarrhea or constipation.   GENITOURINARY:   (    ) dysuria, frequency or hematuria  NEUROLOGICAL:  (   ) numbness or weakness   All other review of systems is negative unless indicated above    Vital Signs Last 24 Hrs  T(C): 36.7 (31 Jul 2019 04:34), Max: 36.9 (30 Jul 2019 12:20)  T(F): 98.1 (31 Jul 2019 04:34), Max: 98.5 (30 Jul 2019 12:20)  HR: 62 (31 Jul 2019 05:52) (62 - 74)  BP: 100/58 (31 Jul 2019 05:52) (100/58 - 111/62)  BP(mean): --  RR: 18 (31 Jul 2019 04:34) (18 - 18)  SpO2: 98% (31 Jul 2019 04:34) (95% - 100%)    I&O's Summary    30 Jul 2019 07:01  -  31 Jul 2019 07:00  --------------------------------------------------------  IN: 240 mL / OUT: 500 mL / NET: -260 mL    31 Jul 2019 07:01  -  31 Jul 2019 09:57  --------------------------------------------------------  IN: 120 mL / OUT: 250 mL / NET: -130 mL        CAPILLARY BLOOD GLUCOSE      POCT Blood Glucose.: 177 mg/dL (31 Jul 2019 07:37)  POCT Blood Glucose.: 223 mg/dL (30 Jul 2019 21:21)  POCT Blood Glucose.: 199 mg/dL (30 Jul 2019 16:44)  POCT Blood Glucose.: 326 mg/dL (30 Jul 2019 11:40)      PHYSICAL EXAM:    Constitutional:  (   ) NAD,   (   )awake and alert  HEENT: PERR, EOMI,    Neck: Soft and supple, No LAD, No JVD  Respiratory:  (    Breath sounds are clear bilaterally,    (   ) wheezing, rales or rhonchi  Cardiovascular:     (   )S1 and S2, regular rate and rhythm, no Murmurs, gallops or rubs  Gastrointestinal:  (   )Bowel Sounds present, soft,   (  )nontender, nondistended,    Extremities:    (  ) peripheral edema  Vascular: 2+ peripheral pulses  Neurological:    (    )A/O x 3,   (  ) focal deficits  Musculoskeletal:    (   )  normal strength b/l upper  (     ) normal  lower extremities  Skin: No rashes    MEDICATIONS:  MEDICATIONS  (STANDING):  acetaminophen  IVPB .. 1000 milliGRAM(s) IV Intermittent once  aspirin enteric coated 81 milliGRAM(s) Oral daily  atorvastatin 40 milliGRAM(s) Oral at bedtime  chlorhexidine 2% Cloths 1 Application(s) Topical daily  clopidogrel Tablet 75 milliGRAM(s) Oral daily  dextrose 5%. 1000 milliLiter(s) (50 mL/Hr) IV Continuous <Continuous>  dextrose 50% Injectable 12.5 Gram(s) IV Push once  dextrose 50% Injectable 25 Gram(s) IV Push once  dextrose 50% Injectable 25 Gram(s) IV Push once  digoxin     Tablet 0.125 milliGRAM(s) Oral daily  docusate sodium 100 milliGRAM(s) Oral two times a day  hydrALAZINE 50 milliGRAM(s) Oral every 8 hours  insulin glargine Injectable (LANTUS) 56 Unit(s) SubCutaneous at bedtime  insulin lispro (HumaLOG) corrective regimen sliding scale   SubCutaneous three times a day before meals  insulin lispro (HumaLOG) corrective regimen sliding scale   SubCutaneous at bedtime  insulin lispro Injectable (HumaLOG) 28 Unit(s) SubCutaneous three times a day before meals  isosorbide   dinitrate Tablet (ISORDIL) 30 milliGRAM(s) Oral three times a day  levothyroxine 50 MICROGram(s) Oral daily  lidocaine   Patch 1 Patch Transdermal daily  melatonin 3 milliGRAM(s) Oral at bedtime  metoprolol tartrate 12.5 milliGRAM(s) Oral two times a day  montelukast 10 milliGRAM(s) Oral daily  pantoprazole    Tablet 40 milliGRAM(s) Oral before breakfast  polyethylene glycol 3350 17 Gram(s) Oral daily  senna 2 Tablet(s) Oral at bedtime  spironolactone 12.5 milliGRAM(s) Oral daily  torsemide 100 milliGRAM(s) Oral daily      LABS: All Labs Reviewed:                        9.3    7.1   )-----------( 324      ( 30 Jul 2019 08:28 )             27.9     07-31    139  |  102  |  57<H>  ----------------------------<  133<H>  3.4<L>   |  23  |  1.69<H>    Ca    9.8      31 Jul 2019 07:45  Mg     2.0     07-31            Blood Culture:   Urine Culture      RADIOLOGY/EKG:    ASSESSMENT AND PLAN:    DVT PPX:    ADVANCED DIRECTIVE:    DISPOSITION: CHIEF COMPLAINT:  patient laying in the bed awake with verbal still feeling weak and her Oscar was discontinued also she is on po Lasix now  SUBJECTIVE:     REVIEW OF SYSTEMS:    CONSTITUTIONAL: (x  )  weakness,  (  ) fevers or chills  EYES/ENT: (  )visual changes;     NECK: (  ) pain or stiffness  RESPIRATORY:   (  )cough, wheezing, hemoptysis;  (  ) shortness of breath  CARDIOVASCULAR:  (  )chest pain or palpitations  GASTROINTESTINAL:   (  )abdominal or epigastric pain.  (  ) nausea, vomiting, or hematemesis;   (   ) diarrhea or constipation.   GENITOURINARY:   (    ) dysuria, frequency or hematuria  NEUROLOGICAL:  (   ) numbness or weakness   All other review of systems is negative unless indicated above    Vital Signs Last 24 Hrs  T(C): 36.7 (31 Jul 2019 04:34), Max: 36.9 (30 Jul 2019 12:20)  T(F): 98.1 (31 Jul 2019 04:34), Max: 98.5 (30 Jul 2019 12:20)  HR: 62 (31 Jul 2019 05:52) (62 - 74)  BP: 100/58 (31 Jul 2019 05:52) (100/58 - 111/62)  BP(mean): --  RR: 18 (31 Jul 2019 04:34) (18 - 18)  SpO2: 98% (31 Jul 2019 04:34) (95% - 100%)    I&O's Summary    30 Jul 2019 07:01  -  31 Jul 2019 07:00  --------------------------------------------------------  IN: 240 mL / OUT: 500 mL / NET: -260 mL    31 Jul 2019 07:01  -  31 Jul 2019 09:57  --------------------------------------------------------  IN: 120 mL / OUT: 250 mL / NET: -130 mL        CAPILLARY BLOOD GLUCOSE      POCT Blood Glucose.: 177 mg/dL (31 Jul 2019 07:37)  POCT Blood Glucose.: 223 mg/dL (30 Jul 2019 21:21)  POCT Blood Glucose.: 199 mg/dL (30 Jul 2019 16:44)  POCT Blood Glucose.: 326 mg/dL (30 Jul 2019 11:40)      PHYSICAL EXAM:        Constitutional:  ( x ) NAD,   (  x )awake and alert  HEENT: PERR, EOMI,    Neck: Soft and supple, No LAD, No JVD  Respiratory:  (  x  Breath sounds are clear bilaterally,    ( x  ) wheezing, rales or rhonchi  Cardiovascular:     (  x )S1 and S2, regular rate and rhythm, no Murmurs, gallops or rubs  Gastrointestinal:  (   )Bowel Sounds present, soft,   (  )nontender, nondistended,    Extremities:    (  ) peripheral edema  Vascular: 2+ peripheral pulses  Neurological:    (    )A/O x 3,   (  ) focal deficits  Musculoskeletal:    (   )  normal strength b/l upper  (     ) normal  lower extremities  Skin: No rashes    MEDICATIONS:  MEDICATIONS  (STANDING):  acetaminophen  IVPB .. 1000 milliGRAM(s) IV Intermittent once  aspirin enteric coated 81 milliGRAM(s) Oral daily  atorvastatin 40 milliGRAM(s) Oral at bedtime  chlorhexidine 2% Cloths 1 Application(s) Topical daily  clopidogrel Tablet 75 milliGRAM(s) Oral daily  dextrose 5%. 1000 milliLiter(s) (50 mL/Hr) IV Continuous <Continuous>  dextrose 50% Injectable 12.5 Gram(s) IV Push once  dextrose 50% Injectable 25 Gram(s) IV Push once  dextrose 50% Injectable 25 Gram(s) IV Push once  digoxin     Tablet 0.125 milliGRAM(s) Oral daily  docusate sodium 100 milliGRAM(s) Oral two times a day  hydrALAZINE 50 milliGRAM(s) Oral every 8 hours  insulin glargine Injectable (LANTUS) 56 Unit(s) SubCutaneous at bedtime  insulin lispro (HumaLOG) corrective regimen sliding scale   SubCutaneous three times a day before meals  insulin lispro (HumaLOG) corrective regimen sliding scale   SubCutaneous at bedtime  insulin lispro Injectable (HumaLOG) 28 Unit(s) SubCutaneous three times a day before meals  isosorbide   dinitrate Tablet (ISORDIL) 30 milliGRAM(s) Oral three times a day  levothyroxine 50 MICROGram(s) Oral daily  lidocaine   Patch 1 Patch Transdermal daily  melatonin 3 milliGRAM(s) Oral at bedtime  metoprolol tartrate 12.5 milliGRAM(s) Oral two times a day  montelukast 10 milliGRAM(s) Oral daily  pantoprazole    Tablet 40 milliGRAM(s) Oral before breakfast  polyethylene glycol 3350 17 Gram(s) Oral daily  senna 2 Tablet(s) Oral at bedtime  spironolactone 12.5 milliGRAM(s) Oral daily  torsemide 100 milliGRAM(s) Oral daily      LABS: All Labs Reviewed:                        9.3    7.1   )-----------( 324      ( 30 Jul 2019 08:28 )             27.9     07-31    139  |  102  |  57<H>  ----------------------------<  133<H>  3.4<L>   |  23  |  1.69<H>    Ca    9.8      31 Jul 2019 07:45  Mg     2.0     07-31            Blood Culture:   Urine Culture      RADIOLOGY/EKG:    ASSESSMENT AND PLAN:  CAD CHF on by mouth Lasix at present time will monitor patient condition also low-dose Aldactone 12.5 mg was added we monitored the patient closely family refusing rehab they wanted to take patient home and not comfortable to discharge the patient in a.m. she need more time to be stabilized if not deteriorate  DVT PPX:    ADVANCED DIRECTIVE:    DISPOSITION:

## 2019-07-31 NOTE — PROGRESS NOTE ADULT - SUBJECTIVE AND OBJECTIVE BOX
Endocrinology Attending Covering for Dr. Banks      Chief complaint  Patient is a 73y old  Female who presents with a chief complaint of Hypoxia, SOB (31 Jul 2019 09:56)   Review of systems  Patient in bed, looks comfortable, no fever,  had no hypoglycemia.    Labs and Fingersticks  CAPILLARY BLOOD GLUCOSE      POCT Blood Glucose.: 177 mg/dL (31 Jul 2019 07:37)  POCT Blood Glucose.: 223 mg/dL (30 Jul 2019 21:21)  POCT Blood Glucose.: 199 mg/dL (30 Jul 2019 16:44)  POCT Blood Glucose.: 326 mg/dL (30 Jul 2019 11:40)      Anion Gap, Serum: 14 (07-31 @ 07:45)  Anion Gap, Serum: 15 (07-30 @ 08:28)      Calcium, Total Serum: 9.8 (07-31 @ 07:45)  Calcium, Total Serum: 9.5 (07-30 @ 08:28)          07-31    139  |  102  |  57<H>  ----------------------------<  133<H>  3.4<L>   |  23  |  1.69<H>    Ca    9.8      31 Jul 2019 07:45  Mg     2.0     07-31                          9.3    7.1   )-----------( 324      ( 30 Jul 2019 08:28 )             27.9     Medications  MEDICATIONS  (STANDING):  acetaminophen  IVPB .. 1000 milliGRAM(s) IV Intermittent once  aspirin enteric coated 81 milliGRAM(s) Oral daily  atorvastatin 40 milliGRAM(s) Oral at bedtime  chlorhexidine 2% Cloths 1 Application(s) Topical daily  clopidogrel Tablet 75 milliGRAM(s) Oral daily  dextrose 5%. 1000 milliLiter(s) (50 mL/Hr) IV Continuous <Continuous>  dextrose 50% Injectable 12.5 Gram(s) IV Push once  dextrose 50% Injectable 25 Gram(s) IV Push once  dextrose 50% Injectable 25 Gram(s) IV Push once  digoxin     Tablet 0.125 milliGRAM(s) Oral daily  docusate sodium 100 milliGRAM(s) Oral two times a day  hydrALAZINE 50 milliGRAM(s) Oral every 8 hours  insulin glargine Injectable (LANTUS) 56 Unit(s) SubCutaneous at bedtime  insulin lispro (HumaLOG) corrective regimen sliding scale   SubCutaneous three times a day before meals  insulin lispro (HumaLOG) corrective regimen sliding scale   SubCutaneous at bedtime  insulin lispro Injectable (HumaLOG) 28 Unit(s) SubCutaneous three times a day before meals  isosorbide   dinitrate Tablet (ISORDIL) 30 milliGRAM(s) Oral three times a day  levothyroxine 50 MICROGram(s) Oral daily  lidocaine   Patch 1 Patch Transdermal daily  melatonin 3 milliGRAM(s) Oral at bedtime  metoprolol tartrate 12.5 milliGRAM(s) Oral two times a day  montelukast 10 milliGRAM(s) Oral daily  pantoprazole    Tablet 40 milliGRAM(s) Oral before breakfast  polyethylene glycol 3350 17 Gram(s) Oral daily  senna 2 Tablet(s) Oral at bedtime  spironolactone 12.5 milliGRAM(s) Oral daily  torsemide 100 milliGRAM(s) Oral daily      Physical Exam  General: Patient comfortable in bed  Vital Signs Last 12 Hrs  T(F): 98.1 (07-31-19 @ 04:34), Max: 98.1 (07-31-19 @ 04:34)  HR: 62 (07-31-19 @ 05:52) (62 - 63)  BP: 100/58 (07-31-19 @ 05:52) (100/58 - 111/62)  BP(mean): --  RR: 18 (07-31-19 @ 04:34) (18 - 18)  SpO2: 98% (07-31-19 @ 04:34) (98% - 98%)  Neck: No palpable thyroid nodules.  CVS: S1S2, No murmurs  Respiratory: No wheezing, no crepitations  GI: Abdomen soft, bowel sounds positive  Musculoskeletal:  edema lower extremities.   Skin: No skin rashes, no ecchymosis    Diagnostics

## 2019-07-31 NOTE — PROGRESS NOTE ADULT - SUBJECTIVE AND OBJECTIVE BOX
Patient seen and examined at bedside.    Overnight Events: No acute events overnight.       REVIEW OF SYSTEMS:  Constitutional:     [ ] negative [ ] fevers [ ] chills [ ] weight loss [ ] weight gain  HEENT:                  [ ] negative [ ] dry eyes [ ] eye irritation [ ] postnasal drip [ ] nasal congestion  CV:                         [ ] negative  [ ] chest pain [ ] orthopnea [ ] palpitations [ ] murmur  Resp:                     [ ] negative [ ] cough [ ] shortness of breath [ ] dyspnea [ ] wheezing [ ] sputum [ ]hemoptysis  GI:                          [ ] negative [ ] nausea [ ] vomiting [ ] diarrhea [ ] constipation [ ] abd pain [ ] dysphagia   :                        [ ] negative [ ] dysuria [ ] nocturia [ ] hematuria [ ] increased urinary frequency  Musculoskeletal: [ ] negative [ ] back pain [ ] myalgias [ ] arthralgias [ ] fracture  Skin:                       [ ] negative [ ] rash [ ] itch  Neurological:        [ ] negative [ ] headache [ ] dizziness [ ] syncope [ ] weakness [ ] numbness  Psychiatric:           [ ] negative [ ] anxiety [ ] depression  Endocrine:            [ ] negative [ ] diabetes [ ] thyroid problem  Heme/Lymph:      [ ] negative [ ] anemia [ ] bleeding problem  Allergic/Immune: [ ] negative [ ] itchy eyes [ ] nasal discharge [ ] hives [ ] angioedema    [ x] All other systems negative  [ ] Unable to assess ROS due to    Current Meds:  acetaminophen   Tablet .. 650 milliGRAM(s) Oral every 6 hours PRN  acetaminophen  IVPB .. 1000 milliGRAM(s) IV Intermittent once  aspirin enteric coated 81 milliGRAM(s) Oral daily  atorvastatin 40 milliGRAM(s) Oral at bedtime  chlorhexidine 2% Cloths 1 Application(s) Topical daily  clopidogrel Tablet 75 milliGRAM(s) Oral daily  dextrose 40% Gel 15 Gram(s) Oral once PRN  dextrose 5%. 1000 milliLiter(s) IV Continuous <Continuous>  dextrose 50% Injectable 12.5 Gram(s) IV Push once  dextrose 50% Injectable 25 Gram(s) IV Push once  dextrose 50% Injectable 25 Gram(s) IV Push once  digoxin     Tablet 0.125 milliGRAM(s) Oral daily  docusate sodium 100 milliGRAM(s) Oral two times a day  glucagon  Injectable 1 milliGRAM(s) IntraMuscular once PRN  hydrALAZINE 50 milliGRAM(s) Oral every 8 hours  insulin glargine Injectable (LANTUS) 56 Unit(s) SubCutaneous at bedtime  insulin lispro (HumaLOG) corrective regimen sliding scale   SubCutaneous three times a day before meals  insulin lispro (HumaLOG) corrective regimen sliding scale   SubCutaneous at bedtime  insulin lispro Injectable (HumaLOG) 28 Unit(s) SubCutaneous three times a day before meals  isosorbide   dinitrate Tablet (ISORDIL) 30 milliGRAM(s) Oral three times a day  levothyroxine 50 MICROGram(s) Oral daily  lidocaine   Patch 1 Patch Transdermal daily  melatonin 3 milliGRAM(s) Oral at bedtime  metoprolol tartrate 12.5 milliGRAM(s) Oral two times a day  montelukast 10 milliGRAM(s) Oral daily  pantoprazole    Tablet 40 milliGRAM(s) Oral before breakfast  polyethylene glycol 3350 17 Gram(s) Oral daily  senna 2 Tablet(s) Oral at bedtime  spironolactone 12.5 milliGRAM(s) Oral daily  torsemide 100 milliGRAM(s) Oral daily      PAST MEDICAL & SURGICAL HISTORY:  GERD (gastroesophageal reflux disease)  CAD (coronary artery disease): s/p CABG  Hypothyroidism  Hyperlipidemia  Diabetes mellitus, type 2  S/P CABG (coronary artery bypass graft)      Vitals:  T(F): 98.1 (07-31), Max: 98.5 (07-30)  HR: 62 (07-31) (62 - 74)  BP: 100/58 (07-31) (100/58 - 111/62)  RR: 18 (07-31)  SpO2: 98% (07-31)  I&O's Summary    30 Jul 2019 07:01 - 31 Jul 2019 07:00  --------------------------------------------------------  IN: 240 mL / OUT: 500 mL / NET: -260 mL    31 Jul 2019 07:01 - 31 Jul 2019 09:53  --------------------------------------------------------  IN: 120 mL / OUT: 250 mL / NET: -130 mL        Physical Exam:  Appearance: No acute distress; well appearing  Eyes: PERRL, EOMI, pink conjunctiva  HENT: Normal oral mucosa  Cardiovascular: RRR, S1, S2, no murmurs, rubs, or gallops; no edema; no JVD  Respiratory: decreased BS b/l   Gastrointestinal: soft, non-tender, non-distended with normal bowel sounds  Musculoskeletal: No clubbing; no joint deformity   Neurologic: Non-focal  Lymphatic: No lymphadenopathy  Psychiatry: AAOx3, mood & affect appropriate  Skin: No rashes, ecchymoses, or cyanosis                          9.3    7.1   )-----------( 324      ( 30 Jul 2019 08:28 )             27.9     07-31    139  |  102  |  57<H>  ----------------------------<  133<H>  3.4<L>   |  23  |  1.69<H>    Ca    9.8      31 Jul 2019 07:45  Mg     2.0     07-31            New ECG(s): Personally reviewed    Echo:  < from: TTE with Doppler (w/Cont) (07.09.19 @ 07:49) >  ------------------------------------------------------------------------  Conclusions:  1. Severe global left ventricular systolic dysfunction.  Endocardial visualization enhanced with intravenous  injection of Ultrasonic Enhancing Agent (Definity).  2. The right ventricle is not well visualized; grossly  normal right ventricular systolic function.  3. Estimated right ventricular systolic pressure equals 67  mm Hg, assuming right atrial pressure equals 15 mm Hg,  consistent with severe pulmonary hypertension.  4. Normal tricuspid valve. Mild-moderate tricuspid  regurgitation.  ------------------------------------------------------------------------  Confirmed on  7/9/2019 - 12:56:01 by Meaghan Arvizu M.D.  ------------------------------------------------------------------------    < end of copied text >      Interpretation of Telemetry: Sinus 60-70

## 2019-08-01 LAB
ALBUMIN SERPL ELPH-MCNC: 3.8 G/DL — SIGNIFICANT CHANGE UP (ref 3.3–5)
ALP SERPL-CCNC: 111 U/L — SIGNIFICANT CHANGE UP (ref 40–120)
ALT FLD-CCNC: 33 U/L — SIGNIFICANT CHANGE UP (ref 10–45)
ANION GAP SERPL CALC-SCNC: 17 MMOL/L — SIGNIFICANT CHANGE UP (ref 5–17)
AST SERPL-CCNC: 37 U/L — SIGNIFICANT CHANGE UP (ref 10–40)
BILIRUB DIRECT SERPL-MCNC: <0.1 MG/DL — SIGNIFICANT CHANGE UP (ref 0–0.2)
BILIRUB INDIRECT FLD-MCNC: >0.1 MG/DL — LOW (ref 0.2–1)
BILIRUB SERPL-MCNC: 0.2 MG/DL — SIGNIFICANT CHANGE UP (ref 0.2–1.2)
BUN SERPL-MCNC: 56 MG/DL — HIGH (ref 7–23)
CALCIUM SERPL-MCNC: 9.7 MG/DL — SIGNIFICANT CHANGE UP (ref 8.4–10.5)
CHLORIDE SERPL-SCNC: 102 MMOL/L — SIGNIFICANT CHANGE UP (ref 96–108)
CO2 SERPL-SCNC: 22 MMOL/L — SIGNIFICANT CHANGE UP (ref 22–31)
CREAT SERPL-MCNC: 1.65 MG/DL — HIGH (ref 0.5–1.3)
GLUCOSE BLDC GLUCOMTR-MCNC: 144 MG/DL — HIGH (ref 70–99)
GLUCOSE BLDC GLUCOMTR-MCNC: 174 MG/DL — HIGH (ref 70–99)
GLUCOSE BLDC GLUCOMTR-MCNC: 280 MG/DL — HIGH (ref 70–99)
GLUCOSE BLDC GLUCOMTR-MCNC: 453 MG/DL — CRITICAL HIGH (ref 70–99)
GLUCOSE SERPL-MCNC: 173 MG/DL — HIGH (ref 70–99)
HCT VFR BLD CALC: 29 % — LOW (ref 34.5–45)
HGB BLD-MCNC: 9 G/DL — LOW (ref 11.5–15.5)
MCHC RBC-ENTMCNC: 28 PG — SIGNIFICANT CHANGE UP (ref 27–34)
MCHC RBC-ENTMCNC: 31 GM/DL — LOW (ref 32–36)
MCV RBC AUTO: 90.3 FL — SIGNIFICANT CHANGE UP (ref 80–100)
PLATELET # BLD AUTO: 322 K/UL — SIGNIFICANT CHANGE UP (ref 150–400)
POTASSIUM SERPL-MCNC: 3.6 MMOL/L — SIGNIFICANT CHANGE UP (ref 3.5–5.3)
POTASSIUM SERPL-SCNC: 3.6 MMOL/L — SIGNIFICANT CHANGE UP (ref 3.5–5.3)
PROT SERPL-MCNC: 7.8 G/DL — SIGNIFICANT CHANGE UP (ref 6–8.3)
RBC # BLD: 3.21 M/UL — LOW (ref 3.8–5.2)
RBC # FLD: 16.5 % — HIGH (ref 10.3–14.5)
SODIUM SERPL-SCNC: 141 MMOL/L — SIGNIFICANT CHANGE UP (ref 135–145)
WBC # BLD: 7.13 K/UL — SIGNIFICANT CHANGE UP (ref 3.8–10.5)
WBC # FLD AUTO: 7.13 K/UL — SIGNIFICANT CHANGE UP (ref 3.8–10.5)

## 2019-08-01 PROCEDURE — 99233 SBSQ HOSP IP/OBS HIGH 50: CPT | Mod: GC

## 2019-08-01 RX ORDER — SPIRONOLACTONE 25 MG/1
25 TABLET, FILM COATED ORAL DAILY
Refills: 0 | Status: DISCONTINUED | OUTPATIENT
Start: 2019-08-01 | End: 2019-09-05

## 2019-08-01 RX ADMIN — ISOSORBIDE DINITRATE 30 MILLIGRAM(S): 5 TABLET ORAL at 05:40

## 2019-08-01 RX ADMIN — Medication 100 MILLIGRAM(S): at 05:40

## 2019-08-01 RX ADMIN — POLYETHYLENE GLYCOL 3350 17 GRAM(S): 17 POWDER, FOR SOLUTION ORAL at 08:15

## 2019-08-01 RX ADMIN — Medication 81 MILLIGRAM(S): at 08:14

## 2019-08-01 RX ADMIN — Medication 0.12 MILLIGRAM(S): at 05:40

## 2019-08-01 RX ADMIN — Medication 4: at 21:57

## 2019-08-01 RX ADMIN — Medication 50 MILLIGRAM(S): at 21:55

## 2019-08-01 RX ADMIN — INSULIN GLARGINE 60 UNIT(S): 100 INJECTION, SOLUTION SUBCUTANEOUS at 21:57

## 2019-08-01 RX ADMIN — PANTOPRAZOLE SODIUM 40 MILLIGRAM(S): 20 TABLET, DELAYED RELEASE ORAL at 05:40

## 2019-08-01 RX ADMIN — SPIRONOLACTONE 12.5 MILLIGRAM(S): 25 TABLET, FILM COATED ORAL at 05:41

## 2019-08-01 RX ADMIN — MONTELUKAST 10 MILLIGRAM(S): 4 TABLET, CHEWABLE ORAL at 08:14

## 2019-08-01 RX ADMIN — Medication 650 MILLIGRAM(S): at 22:37

## 2019-08-01 RX ADMIN — Medication 12.5 MILLIGRAM(S): at 17:50

## 2019-08-01 RX ADMIN — SENNA PLUS 2 TABLET(S): 8.6 TABLET ORAL at 21:57

## 2019-08-01 RX ADMIN — Medication 12.5 MILLIGRAM(S): at 05:40

## 2019-08-01 RX ADMIN — Medication 50 MILLIGRAM(S): at 14:05

## 2019-08-01 RX ADMIN — Medication 50 MICROGRAM(S): at 05:40

## 2019-08-01 RX ADMIN — Medication 3 MILLIGRAM(S): at 21:57

## 2019-08-01 RX ADMIN — ISOSORBIDE DINITRATE 30 MILLIGRAM(S): 5 TABLET ORAL at 14:05

## 2019-08-01 RX ADMIN — ISOSORBIDE DINITRATE 30 MILLIGRAM(S): 5 TABLET ORAL at 21:56

## 2019-08-01 RX ADMIN — CLOPIDOGREL BISULFATE 75 MILLIGRAM(S): 75 TABLET, FILM COATED ORAL at 08:14

## 2019-08-01 RX ADMIN — Medication 30 UNIT(S): at 08:12

## 2019-08-01 RX ADMIN — Medication 50 MILLIGRAM(S): at 05:40

## 2019-08-01 RX ADMIN — Medication 100 MILLIGRAM(S): at 17:50

## 2019-08-01 RX ADMIN — ATORVASTATIN CALCIUM 40 MILLIGRAM(S): 80 TABLET, FILM COATED ORAL at 21:55

## 2019-08-01 RX ADMIN — Medication 650 MILLIGRAM(S): at 23:02

## 2019-08-01 RX ADMIN — LIDOCAINE 1 PATCH: 4 CREAM TOPICAL at 09:00

## 2019-08-01 RX ADMIN — Medication 3: at 17:00

## 2019-08-01 RX ADMIN — LIDOCAINE 1 PATCH: 4 CREAM TOPICAL at 08:15

## 2019-08-01 RX ADMIN — Medication 1: at 08:12

## 2019-08-01 NOTE — PROGRESS NOTE ADULT - ASSESSMENT
Assessment  DMT2: 73y Female with DM T2 with hyperglycemia on insulin, blood sugars improving  174-144 today  CAD: S/P cabg On medications, stable, monitored.  Hypothyroidism:  On synthroid 50  mcg po daily, asymptomatic.  HTN: Controlled, On med.  HLD; on statin  CKD: Monitor labs/BMP,   ESRD: On hemodialysis, Monitor labs/BMP      Plan:   DMT2:  Continue    Lantus to 60 units qhs, Continue  Humalog to 30  units before each meal in addition to correction scale coverage, monitor FS will FU  Patient counseled for compliance with consistent low carb diet.  CAD: S/P cabg On medications, stable, monitored.  Hypothyroidism:  On synthroid 50  mcg po daily, asymptomatic. F/U tsh as outpatient  HTN: Continue treatment, monitoring, Primary team FU  HLD; on statin  CKD: Continue monitoring labs, renal FU  Thank you for consult will F/U  erika Woodard MD  [phone]

## 2019-08-01 NOTE — PROGRESS NOTE ADULT - ASSESSMENT
Pt is a 72 yo woman with PMHx of HTN, T2DM, CAD s/p CABG (LIMA to LAD, SVG to OM, SVG to PDA 2014 at [facility]), non-dilated ICM (EF 20-25%), severe mitral regurgitation s/p mitral clip (6/13/19 Dr. Newman), severe pulm HTN, CKD, hypothyroidism admitted with worsening SOB.    A/P:  MERVIN  Likely sec to cardiogenic shock  Renal function has improved and now remains stable  PT non-oliguric  Continue diuretic therapy per cardiology. Pt currently on Torsemide and Spironolactone   Optimize glucose control  Monitor renal function   Avoid further nephrotoxics, NSAIDS RCA    CKD stage 4:  baseline Scr 1.8-2.0. Scr stable at 1.65 today.   Renal function fluctuates sec to CHF  Monitor renal function at present    SOB:  in setting of HF. Improving.   Continue diuretics per cardiology    Acidosis   Sec to RF  Improved  Monitor serum Co2 at present    Hypokalemia:  in the setting of diuretic therapy.   Supplement KCL 40meq one dose

## 2019-08-01 NOTE — PROGRESS NOTE ADULT - SUBJECTIVE AND OBJECTIVE BOX
Patient seen and examined at bedside.    Overnight Events: Pt states that she feels weak.       REVIEW OF SYSTEMS:  Constitutional:     [ ] negative [ ] fevers [ ] chills [ ] weight loss [ ] weight gain  HEENT:                  [ ] negative [ ] dry eyes [ ] eye irritation [ ] postnasal drip [ ] nasal congestion  CV:                         [ ] negative  [ ] chest pain [ ] orthopnea [ ] palpitations [ ] murmur  Resp:                     [ ] negative [ ] cough [ ] shortness of breath [ ] dyspnea [ ] wheezing [ ] sputum [ ]hemoptysis  GI:                          [ ] negative [ ] nausea [ ] vomiting [ ] diarrhea [ ] constipation [ ] abd pain [ ] dysphagia   :                        [ ] negative [ ] dysuria [ ] nocturia [ ] hematuria [ ] increased urinary frequency  Musculoskeletal: [ ] negative [ ] back pain [ ] myalgias [ ] arthralgias [ ] fracture  Skin:                       [ ] negative [ ] rash [ ] itch  Neurological:        [ ] negative [ ] headache [ ] dizziness [ ] syncope [ ] weakness [ ] numbness  Psychiatric:           [ ] negative [ ] anxiety [ ] depression  Endocrine:            [ ] negative [ ] diabetes [ ] thyroid problem  Heme/Lymph:      [ ] negative [ ] anemia [ ] bleeding problem  Allergic/Immune: [ ] negative [ ] itchy eyes [ ] nasal discharge [ ] hives [ ] angioedema    [x ] All other systems negative  [ ] Unable to assess ROS due to    Current Meds:  acetaminophen   Tablet .. 650 milliGRAM(s) Oral every 6 hours PRN  acetaminophen  IVPB .. 1000 milliGRAM(s) IV Intermittent once  aspirin enteric coated 81 milliGRAM(s) Oral daily  atorvastatin 40 milliGRAM(s) Oral at bedtime  chlorhexidine 2% Cloths 1 Application(s) Topical daily  clopidogrel Tablet 75 milliGRAM(s) Oral daily  dextrose 40% Gel 15 Gram(s) Oral once PRN  dextrose 5%. 1000 milliLiter(s) IV Continuous <Continuous>  dextrose 50% Injectable 12.5 Gram(s) IV Push once  dextrose 50% Injectable 25 Gram(s) IV Push once  dextrose 50% Injectable 25 Gram(s) IV Push once  digoxin     Tablet 0.125 milliGRAM(s) Oral daily  docusate sodium 100 milliGRAM(s) Oral two times a day  glucagon  Injectable 1 milliGRAM(s) IntraMuscular once PRN  hydrALAZINE 50 milliGRAM(s) Oral every 8 hours  insulin glargine Injectable (LANTUS) 60 Unit(s) SubCutaneous at bedtime  insulin lispro (HumaLOG) corrective regimen sliding scale   SubCutaneous three times a day before meals  insulin lispro (HumaLOG) corrective regimen sliding scale   SubCutaneous at bedtime  insulin lispro Injectable (HumaLOG) 30 Unit(s) SubCutaneous three times a day before meals  isosorbide   dinitrate Tablet (ISORDIL) 30 milliGRAM(s) Oral three times a day  levothyroxine 50 MICROGram(s) Oral daily  lidocaine   Patch 1 Patch Transdermal daily  melatonin 3 milliGRAM(s) Oral at bedtime  metoprolol tartrate 12.5 milliGRAM(s) Oral two times a day  montelukast 10 milliGRAM(s) Oral daily  pantoprazole    Tablet 40 milliGRAM(s) Oral before breakfast  polyethylene glycol 3350 17 Gram(s) Oral daily  senna 2 Tablet(s) Oral at bedtime  spironolactone 12.5 milliGRAM(s) Oral daily  torsemide 100 milliGRAM(s) Oral daily      PAST MEDICAL & SURGICAL HISTORY:  GERD (gastroesophageal reflux disease)  CAD (coronary artery disease): s/p CABG  Hypothyroidism  Hyperlipidemia  Diabetes mellitus, type 2  S/P CABG (coronary artery bypass graft)      Vitals:  T(F): 97.9 (08-01), Max: 98.2 (07-31)  HR: 67 (08-01) (65 - 71)  BP: 119/65 (08-01) (109/58 - 124/61)  RR: 18 (08-01)  SpO2: 98% (08-01)  I&O's Summary    31 Jul 2019 07:01  -  01 Aug 2019 07:00  --------------------------------------------------------  IN: 440 mL / OUT: 600 mL / NET: -160 mL        Physical Exam:  Appearance: No acute distress; well appearing  Eyes: PERRL, EOMI, pink conjunctiva  HENT: Normal oral mucosa  Cardiovascular: RRR, S1, S2, no murmurs, rubs, or gallops; no edema; no JVD  Respiratory: bibasilar rales.   Gastrointestinal: soft, non-tender, non-distended with normal bowel sounds  Musculoskeletal: No clubbing; no joint deformity   Neurologic: Non-focal  Lymphatic: No lymphadenopathy  Psychiatry: AAOx3, mood & affect appropriate  Skin: No rashes, ecchymoses, or cyanosis                          9.0    7.13  )-----------( 322      ( 01 Aug 2019 08:31 )             29.0     08-01    141  |  102  |  56<H>  ----------------------------<  173<H>  3.6   |  22  |  1.65<H>    Ca    9.7      01 Aug 2019 06:46  Mg     2.0     07-31    TPro  7.8  /  Alb  3.8  /  TBili  0.2  /  DBili  <0.1  /  AST  37  /  ALT  33  /  AlkPhos  111  08-01        New ECG(s): Personally reviewed    Echo:  < from: TTE with Doppler (w/Cont) (07.09.19 @ 07:49) >  Conclusions:  1. Severe global left ventricular systolic dysfunction.  Endocardial visualization enhanced with intravenous  injection of Ultrasonic Enhancing Agent (Definity).  2. The right ventricle is not well visualized; grossly  normal right ventricular systolic function.  3. Estimated right ventricular systolic pressure equals 67  mm Hg, assuming right atrial pressure equals 15 mm Hg,  consistent with severe pulmonary hypertension.  4. Normal tricuspid valve. Mild-moderate tricuspid  regurgitation.    < end of copied text >    Stress Testing:   < from: Nuclear Stress Test-Pharmacologic (01.28.18 @ 12:31) >  IMPRESSIONS:Abnormal Study  * Myocardial Perfusion SPECT results are abnormal.  * There is a medium sized, mild to moderate defect in  anterior wall that is reversible, suggestive of  ischemia.There are large, moderate to severe defects in  inferolateral, lateral walls that are fixed, suggestive of  infarct.  * Post-stress gated wall motion analysis was performed  (LVEF = 33 %;LVEDV = 116 ml.), revealing severe overall  hypokinesis. There was focal inferiolateral akinesis and  severe lateral hypokinesis with absence of systolic  thickening. The best motion was in the septum.  *** Compared with Nuclear/Stress test of 11/5/2014, the  observed defect are now more extensive, suggesting  progression of disease. Prior LVEF was 39% with EDV 77 mL.    < end of copied text >    Cath:  < from: Cardiac Cath Lab - Adult (05.09.19 @ 17:37) >  VENTRICLES: No left ventriculogram was performed.  CORONARY VESSELS: The coronary circulation is right dominant.  LM:   --  Distal left main: There was a 0 % stenosis at the site of a prior  stent.  LAD:   --  Proximal LAD: There was a tubular 70 % stenosis. There was PARUL  grade 3 flow through the vessel (brisk flow).  --  Mid LAD: There was a 100 % stenosis. There was PARUL grade 0 flow  through the vessel (no flow).  CX:   --  Circumflex: Angiography showed minor luminal irregularities with  no flow limiting lesions.  RI:   --  Ostial ramus intermedius: The distal vessel was supplied by  collaterals from the LAD. There was a 100 % stenosis at the site of a  prior stent. There was PARUL grade 0 flow through the vessel (no flow).  RCA:   --  Mid RCA: The distal vessel was supplied by collaterals from  septal branches of LAD. There was a 100 % stenosis just after RV marginal  2. There was PARUL grade 0 flow through the vessel (no flow).  GRAFTS:   --  Graft to the mid LAD: The graft was a LIMA. It was normal.  COMPLICATIONS: There were no complications.  DIAGNOSTIC RECOMMENDATIONS: Patient management should include close  monitoring of BUN and creatinine. Medical management is recommended.  Prepared and signed by    < end of copied text >      Interpretation of Telemetry: Sinus 60-70

## 2019-08-01 NOTE — PROGRESS NOTE ADULT - SUBJECTIVE AND OBJECTIVE BOX
Endocrinology Attending Covering for Dr. Banks      Chief complaint  Patient is a 73y old  Female who presents with a chief complaint of Hypoxia, SOB (01 Aug 2019 10:59)   Review of systems  Patient in bed, looks comfortable, no fever,  had no hypoglycemia.    Labs and Fingersticks  CAPILLARY BLOOD GLUCOSE      POCT Blood Glucose.: 144 mg/dL (01 Aug 2019 11:51)  POCT Blood Glucose.: 174 mg/dL (01 Aug 2019 07:45)  POCT Blood Glucose.: 329 mg/dL (31 Jul 2019 21:14)  POCT Blood Glucose.: 132 mg/dL (31 Jul 2019 16:14)      Anion Gap, Serum: 17 (08-01 @ 06:46)  Anion Gap, Serum: 14 (07-31 @ 07:45)      Calcium, Total Serum: 9.7 (08-01 @ 06:46)  Calcium, Total Serum: 9.8 (07-31 @ 07:45)  Albumin, Serum: 3.8 (08-01 @ 06:46)    Alanine Aminotransferase (ALT/SGPT): 33 (08-01 @ 06:46)  Alkaline Phosphatase, Serum: 111 (08-01 @ 06:46)  Aspartate Aminotransferase (AST/SGOT): 37 (08-01 @ 06:46)        08-01    141  |  102  |  56<H>  ----------------------------<  173<H>  3.6   |  22  |  1.65<H>    Ca    9.7      01 Aug 2019 06:46  Mg     2.0     07-31    TPro  7.8  /  Alb  3.8  /  TBili  0.2  /  DBili  <0.1  /  AST  37  /  ALT  33  /  AlkPhos  111  08-01                        9.0    7.13  )-----------( 322      ( 01 Aug 2019 08:31 )             29.0     Medications  MEDICATIONS  (STANDING):  acetaminophen  IVPB .. 1000 milliGRAM(s) IV Intermittent once  aspirin enteric coated 81 milliGRAM(s) Oral daily  atorvastatin 40 milliGRAM(s) Oral at bedtime  chlorhexidine 2% Cloths 1 Application(s) Topical daily  clopidogrel Tablet 75 milliGRAM(s) Oral daily  dextrose 5%. 1000 milliLiter(s) (50 mL/Hr) IV Continuous <Continuous>  dextrose 50% Injectable 12.5 Gram(s) IV Push once  dextrose 50% Injectable 25 Gram(s) IV Push once  dextrose 50% Injectable 25 Gram(s) IV Push once  digoxin     Tablet 0.125 milliGRAM(s) Oral daily  docusate sodium 100 milliGRAM(s) Oral two times a day  hydrALAZINE 50 milliGRAM(s) Oral every 8 hours  insulin glargine Injectable (LANTUS) 60 Unit(s) SubCutaneous at bedtime  insulin lispro (HumaLOG) corrective regimen sliding scale   SubCutaneous three times a day before meals  insulin lispro (HumaLOG) corrective regimen sliding scale   SubCutaneous at bedtime  insulin lispro Injectable (HumaLOG) 30 Unit(s) SubCutaneous three times a day before meals  isosorbide   dinitrate Tablet (ISORDIL) 30 milliGRAM(s) Oral three times a day  levothyroxine 50 MICROGram(s) Oral daily  lidocaine   Patch 1 Patch Transdermal daily  melatonin 3 milliGRAM(s) Oral at bedtime  metoprolol tartrate 12.5 milliGRAM(s) Oral two times a day  montelukast 10 milliGRAM(s) Oral daily  pantoprazole    Tablet 40 milliGRAM(s) Oral before breakfast  polyethylene glycol 3350 17 Gram(s) Oral daily  senna 2 Tablet(s) Oral at bedtime  spironolactone 25 milliGRAM(s) Oral daily  torsemide 100 milliGRAM(s) Oral daily      Physical Exam  General: Patient comfortable in bed  Vital Signs Last 12 Hrs  T(F): 97.8 (08-01-19 @ 12:08), Max: 97.9 (08-01-19 @ 04:00)  HR: 66 (08-01-19 @ 12:08) (66 - 71)  BP: 110/64 (08-01-19 @ 12:08) (110/64 - 121/57)  BP(mean): --  RR: 18 (08-01-19 @ 12:08) (18 - 18)  SpO2: 94% (08-01-19 @ 12:08) (94% - 98%)  Neck: No palpable thyroid nodules.  CVS: S1S2, No murmurs  Respiratory: No wheezing, no crepitations  GI: Abdomen soft, bowel sounds positive  Musculoskeletal:  edema lower extremities.   Skin: No skin rashes, no ecchymosis    Diagnostics

## 2019-08-01 NOTE — PROGRESS NOTE ADULT - SUBJECTIVE AND OBJECTIVE BOX
[facility] [place] [facility]   MD RAHEEL SHAH MD RUORU WONG, PA    TEL:  OFFICE: [phone]  DR SANTOYO CELL: [phone]  JUSTEN KENDALL CELL: [phone]  DR. NGUYEN CELL: [phone]  DR. RIDER CELL:  [phone]    RENAL FOLLOW UP NOTE  --------------------------------------------------------------------------------  HPI: Pt seen and examined at bedside. Pt admits to SOB at night. Currently denies SOB.   Denies chest pain     PAST HISTORY  --------------------------------------------------------------------------------  No significant changes to PMH, PSH, FHx, SHx, unless otherwise noted    ALLERGIES & MEDICATIONS  --------------------------------------------------------------------------------  Allergies    azithromycin (Hives; Pruritus)    Intolerances      Standing Inpatient Medications  acetaminophen  IVPB .. 1000 milliGRAM(s) IV Intermittent once  aspirin enteric coated 81 milliGRAM(s) Oral daily  atorvastatin 40 milliGRAM(s) Oral at bedtime  chlorhexidine 2% Cloths 1 Application(s) Topical daily  clopidogrel Tablet 75 milliGRAM(s) Oral daily  dextrose 5%. 1000 milliLiter(s) IV Continuous <Continuous>  dextrose 50% Injectable 12.5 Gram(s) IV Push once  dextrose 50% Injectable 25 Gram(s) IV Push once  dextrose 50% Injectable 25 Gram(s) IV Push once  digoxin     Tablet 0.125 milliGRAM(s) Oral daily  docusate sodium 100 milliGRAM(s) Oral two times a day  hydrALAZINE 50 milliGRAM(s) Oral every 8 hours  insulin glargine Injectable (LANTUS) 60 Unit(s) SubCutaneous at bedtime  insulin lispro (HumaLOG) corrective regimen sliding scale   SubCutaneous three times a day before meals  insulin lispro (HumaLOG) corrective regimen sliding scale   SubCutaneous at bedtime  insulin lispro Injectable (HumaLOG) 30 Unit(s) SubCutaneous three times a day before meals  isosorbide   dinitrate Tablet (ISORDIL) 30 milliGRAM(s) Oral three times a day  levothyroxine 50 MICROGram(s) Oral daily  lidocaine   Patch 1 Patch Transdermal daily  melatonin 3 milliGRAM(s) Oral at bedtime  metoprolol tartrate 12.5 milliGRAM(s) Oral two times a day  montelukast 10 milliGRAM(s) Oral daily  pantoprazole    Tablet 40 milliGRAM(s) Oral before breakfast  polyethylene glycol 3350 17 Gram(s) Oral daily  senna 2 Tablet(s) Oral at bedtime  spironolactone 25 milliGRAM(s) Oral daily  torsemide 100 milliGRAM(s) Oral daily    PRN Inpatient Medications  acetaminophen   Tablet .. 650 milliGRAM(s) Oral every 6 hours PRN  dextrose 40% Gel 15 Gram(s) Oral once PRN  glucagon  Injectable 1 milliGRAM(s) IntraMuscular once PRN      REVIEW OF SYSTEMS  --------------------------------------------------------------------------------  General: no fever  CVS: no chest pain  RESP: no sob, no cough  ABD: no abdominal pain  : no dysuria,  MSK: no edema     VITALS/PHYSICAL EXAM  --------------------------------------------------------------------------------  T(C): 36.6 (08-01-19 @ 12:08), Max: 36.8 (07-31-19 @ 19:35)  HR: 66 (08-01-19 @ 12:08) (65 - 71)  BP: 110/64 (08-01-19 @ 12:08) (109/58 - 124/61)  RR: 18 (08-01-19 @ 12:08) (16 - 18)  SpO2: 94% (08-01-19 @ 12:08) (94% - 100%)  Wt(kg): --    07-31-19 @ 07:01  -  08-01-19 @ 07:00  --------------------------------------------------------  IN: 440 mL / OUT: 600 mL / NET: -160 mL    08-01-19 @ 07:01  -  08-01-19 @ 13:54  --------------------------------------------------------  IN: 460 mL / OUT: 300 mL / NET: 160 mL      Physical Exam:  	Gen: NAD  	HEENT: MMM  	Pulm: Course BS B/L  	CV: S1S2  	Abd: Soft, +BS  	Ext: No LE edema B/L                      Neuro: Awake   	Skin: Warm and Dry     LABS/STUDIES  --------------------------------------------------------------------------------              9.0    7.13  >-----------<  322      [08-01-19 @ 08:31]              29.0     141  |  102  |  56  ----------------------------<  173      [08-01-19 @ 06:46]  3.6   |  22  |  1.65        Ca     9.7     [08-01-19 @ 06:46]      Mg     2.0     [07-31-19 @ 07:45]    TPro  7.8  /  Alb  3.8  /  TBili  0.2  /  DBili  <0.1  /  AST  37  /  ALT  33  /  AlkPhos  111  [08-01-19 @ 06:46]    Creatinine Trend:  SCr 1.65 [08-01 @ 06:46]  SCr 1.69 [07-31 @ 07:45]  SCr 1.69 [07-30 @ 08:28]  SCr 1.61 [07-29 @ 07:10]  SCr 1.62 [07-28 @ 05:42]    Urinalysis - [07-09-19 @ 13:28]      Color Yellow / Appearance Clear / SG 1.012 / pH 6.0      Gluc Negative / Ketone Negative  / Bili Negative / Urobili Negative       Blood Trace / Protein Trace / Leuk Est Negative / Nitrite Negative      RBC 1 / WBC 1 / Hyaline 0 / Gran  / Sq Epi  / Non Sq Epi 0 / Bacteria Moderate      Iron 42, TIBC 348, %sat 12      [07-05-19 @ 22:32]  Ferritin 130      [07-05-19 @ 22:32]  .4 (Ca --)      [04-25-19 @ 05:45]   --  PTH 43.55 (Ca --)      [09-10-18 @ 07:15]   --  PTH 36.60 (Ca --)      [09-08-18 @ 03:15]   --  Vitamin D (25OH) 25.9      [05-01-19 @ 04:00]  HbA1c 7.4      [07-05-19 @ 22:32]  TSH 2.00      [07-05-19 @ 22:32]  Lipid: chol 148, , HDL 35,       [06-01-19 @ 08:00] [facility] [place] [facility]   MD GONZALEZ SHAH D.O, PA    TEL:  OFFICE: [phone]  DR SANTOYO CELL: [phone]  JUSTEN KENDALL CELL: [phone]  DR. RIDER CELL:  [phone]    RENAL FOLLOW UP NOTE  --------------------------------------------------------------------------------  HPI: Pt seen and examined at bedside. Pt admits to SOB at night. Currently denies SOB.   Denies chest pain     PAST HISTORY  --------------------------------------------------------------------------------  No significant changes to PMH, PSH, FHx, SHx, unless otherwise noted    ALLERGIES & MEDICATIONS  --------------------------------------------------------------------------------  Allergies    azithromycin (Hives; Pruritus)    Intolerances      Standing Inpatient Medications  acetaminophen  IVPB .. 1000 milliGRAM(s) IV Intermittent once  aspirin enteric coated 81 milliGRAM(s) Oral daily  atorvastatin 40 milliGRAM(s) Oral at bedtime  chlorhexidine 2% Cloths 1 Application(s) Topical daily  clopidogrel Tablet 75 milliGRAM(s) Oral daily  dextrose 5%. 1000 milliLiter(s) IV Continuous <Continuous>  dextrose 50% Injectable 12.5 Gram(s) IV Push once  dextrose 50% Injectable 25 Gram(s) IV Push once  dextrose 50% Injectable 25 Gram(s) IV Push once  digoxin     Tablet 0.125 milliGRAM(s) Oral daily  docusate sodium 100 milliGRAM(s) Oral two times a day  hydrALAZINE 50 milliGRAM(s) Oral every 8 hours  insulin glargine Injectable (LANTUS) 60 Unit(s) SubCutaneous at bedtime  insulin lispro (HumaLOG) corrective regimen sliding scale   SubCutaneous three times a day before meals  insulin lispro (HumaLOG) corrective regimen sliding scale   SubCutaneous at bedtime  insulin lispro Injectable (HumaLOG) 30 Unit(s) SubCutaneous three times a day before meals  isosorbide   dinitrate Tablet (ISORDIL) 30 milliGRAM(s) Oral three times a day  levothyroxine 50 MICROGram(s) Oral daily  lidocaine   Patch 1 Patch Transdermal daily  melatonin 3 milliGRAM(s) Oral at bedtime  metoprolol tartrate 12.5 milliGRAM(s) Oral two times a day  montelukast 10 milliGRAM(s) Oral daily  pantoprazole    Tablet 40 milliGRAM(s) Oral before breakfast  polyethylene glycol 3350 17 Gram(s) Oral daily  senna 2 Tablet(s) Oral at bedtime  spironolactone 25 milliGRAM(s) Oral daily  torsemide 100 milliGRAM(s) Oral daily    PRN Inpatient Medications  acetaminophen   Tablet .. 650 milliGRAM(s) Oral every 6 hours PRN  dextrose 40% Gel 15 Gram(s) Oral once PRN  glucagon  Injectable 1 milliGRAM(s) IntraMuscular once PRN      REVIEW OF SYSTEMS  --------------------------------------------------------------------------------  General: no fever  CVS: no chest pain  RESP: no sob, no cough  ABD: no abdominal pain  : no dysuria,  MSK: no edema     VITALS/PHYSICAL EXAM  --------------------------------------------------------------------------------  T(C): 36.6 (08-01-19 @ 12:08), Max: 36.8 (07-31-19 @ 19:35)  HR: 66 (08-01-19 @ 12:08) (65 - 71)  BP: 110/64 (08-01-19 @ 12:08) (109/58 - 124/61)  RR: 18 (08-01-19 @ 12:08) (16 - 18)  SpO2: 94% (08-01-19 @ 12:08) (94% - 100%)  Wt(kg): --    07-31-19 @ 07:01  -  08-01-19 @ 07:00  --------------------------------------------------------  IN: 440 mL / OUT: 600 mL / NET: -160 mL    08-01-19 @ 07:01  -  08-01-19 @ 13:54  --------------------------------------------------------  IN: 460 mL / OUT: 300 mL / NET: 160 mL      Physical Exam:  	Gen: NAD  	HEENT: MMM  	Pulm: Course BS B/L  	CV: S1S2  	Abd: Soft, +BS  	Ext: No LE edema B/L                      Neuro: Awake   	Skin: Warm and Dry     LABS/STUDIES  --------------------------------------------------------------------------------              9.0    7.13  >-----------<  322      [08-01-19 @ 08:31]              29.0     141  |  102  |  56  ----------------------------<  173      [08-01-19 @ 06:46]  3.6   |  22  |  1.65        Ca     9.7     [08-01-19 @ 06:46]      Mg     2.0     [07-31-19 @ 07:45]    TPro  7.8  /  Alb  3.8  /  TBili  0.2  /  DBili  <0.1  /  AST  37  /  ALT  33  /  AlkPhos  111  [08-01-19 @ 06:46]    Creatinine Trend:  SCr 1.65 [08-01 @ 06:46]  SCr 1.69 [07-31 @ 07:45]  SCr 1.69 [07-30 @ 08:28]  SCr 1.61 [07-29 @ 07:10]  SCr 1.62 [07-28 @ 05:42]    Urinalysis - [07-09-19 @ 13:28]      Color Yellow / Appearance Clear / SG 1.012 / pH 6.0      Gluc Negative / Ketone Negative  / Bili Negative / Urobili Negative       Blood Trace / Protein Trace / Leuk Est Negative / Nitrite Negative      RBC 1 / WBC 1 / Hyaline 0 / Gran  / Sq Epi  / Non Sq Epi 0 / Bacteria Moderate      Iron 42, TIBC 348, %sat 12      [07-05-19 @ 22:32]  Ferritin 130      [07-05-19 @ 22:32]  .4 (Ca --)      [04-25-19 @ 05:45]   --  PTH 43.55 (Ca --)      [09-10-18 @ 07:15]   --  PTH 36.60 (Ca --)      [09-08-18 @ 03:15]   --  Vitamin D (25OH) 25.9      [05-01-19 @ 04:00]  HbA1c 7.4      [07-05-19 @ 22:32]  TSH 2.00      [07-05-19 @ 22:32]  Lipid: chol 148, , HDL 35,       [06-01-19 @ 08:00]

## 2019-08-01 NOTE — PROGRESS NOTE ADULT - ATTENDING COMMENTS
73 year old woman with history of remote CABG, MitraClip 6 weeks ago sent to ER from Rehab complaining of shortness of breath and lower extremity edema and hypoxemia. On exam mild volume overload, improving. No further recurrent SVT. Conclude that episodes are atrial tachycardia and if recurring, amiodarone likely best option for management. However no recurrence in few days. Responds to high dose IV furosemide, but volume overload recurs if switch to oral furosemide. Thus switched to high dose torsemide and aldosterone antagonist and monitoring volume status closely. Should increase spironolactone dose.

## 2019-08-01 NOTE — PROGRESS NOTE ADULT - SUBJECTIVE AND OBJECTIVE BOX
CHIEF COMPLAINT:    SUBJECTIVE:     REVIEW OF SYSTEMS:    CONSTITUTIONAL: (  )  weakness,  (  ) fevers or chills  EYES/ENT: (  )visual changes;     NECK: (  ) pain or stiffness  RESPIRATORY:   (  )cough, wheezing, hemoptysis;  (  ) shortness of breath  CARDIOVASCULAR:  (  )chest pain or palpitations  GASTROINTESTINAL:   (  )abdominal or epigastric pain.  (  ) nausea, vomiting, or hematemesis;   (   ) diarrhea or constipation.   GENITOURINARY:   (    ) dysuria, frequency or hematuria  NEUROLOGICAL:  (   ) numbness or weakness   All other review of systems is negative unless indicated above    Vital Signs Last 24 Hrs  T(C): 36.6 (01 Aug 2019 04:00), Max: 36.8 (31 Jul 2019 19:35)  T(F): 97.9 (01 Aug 2019 04:00), Max: 98.2 (31 Jul 2019 19:35)  HR: 67 (01 Aug 2019 05:40) (65 - 71)  BP: 119/65 (01 Aug 2019 05:40) (109/58 - 124/61)  BP(mean): --  RR: 18 (01 Aug 2019 04:00) (16 - 18)  SpO2: 98% (01 Aug 2019 04:00) (98% - 100%)    I&O's Summary    31 Jul 2019 07:01  -  01 Aug 2019 07:00  --------------------------------------------------------  IN: 440 mL / OUT: 600 mL / NET: -160 mL    01 Aug 2019 07:01  -  01 Aug 2019 10:59  --------------------------------------------------------  IN: 220 mL / OUT: 200 mL / NET: 20 mL        CAPILLARY BLOOD GLUCOSE      POCT Blood Glucose.: 174 mg/dL (01 Aug 2019 07:45)  POCT Blood Glucose.: 329 mg/dL (31 Jul 2019 21:14)  POCT Blood Glucose.: 132 mg/dL (31 Jul 2019 16:14)  POCT Blood Glucose.: 219 mg/dL (31 Jul 2019 11:32)      PHYSICAL EXAM:    Constitutional:  (   ) NAD,   (   )awake and alert  HEENT: PERR, EOMI,    Neck: Soft and supple, No LAD, No JVD  Respiratory:  (    Breath sounds are clear bilaterally,    (   ) wheezing, rales or rhonchi  Cardiovascular:     (   )S1 and S2, regular rate and rhythm, no Murmurs, gallops or rubs  Gastrointestinal:  (   )Bowel Sounds present, soft,   (  )nontender, nondistended,    Extremities:    (  ) peripheral edema  Vascular: 2+ peripheral pulses  Neurological:    (    )A/O x 3,   (  ) focal deficits  Musculoskeletal:    (   )  normal strength b/l upper  (     ) normal  lower extremities  Skin: No rashes    MEDICATIONS:  MEDICATIONS  (STANDING):  acetaminophen  IVPB .. 1000 milliGRAM(s) IV Intermittent once  aspirin enteric coated 81 milliGRAM(s) Oral daily  atorvastatin 40 milliGRAM(s) Oral at bedtime  chlorhexidine 2% Cloths 1 Application(s) Topical daily  clopidogrel Tablet 75 milliGRAM(s) Oral daily  dextrose 5%. 1000 milliLiter(s) (50 mL/Hr) IV Continuous <Continuous>  dextrose 50% Injectable 12.5 Gram(s) IV Push once  dextrose 50% Injectable 25 Gram(s) IV Push once  dextrose 50% Injectable 25 Gram(s) IV Push once  digoxin     Tablet 0.125 milliGRAM(s) Oral daily  docusate sodium 100 milliGRAM(s) Oral two times a day  hydrALAZINE 50 milliGRAM(s) Oral every 8 hours  insulin glargine Injectable (LANTUS) 60 Unit(s) SubCutaneous at bedtime  insulin lispro (HumaLOG) corrective regimen sliding scale   SubCutaneous three times a day before meals  insulin lispro (HumaLOG) corrective regimen sliding scale   SubCutaneous at bedtime  insulin lispro Injectable (HumaLOG) 30 Unit(s) SubCutaneous three times a day before meals  isosorbide   dinitrate Tablet (ISORDIL) 30 milliGRAM(s) Oral three times a day  levothyroxine 50 MICROGram(s) Oral daily  lidocaine   Patch 1 Patch Transdermal daily  melatonin 3 milliGRAM(s) Oral at bedtime  metoprolol tartrate 12.5 milliGRAM(s) Oral two times a day  montelukast 10 milliGRAM(s) Oral daily  pantoprazole    Tablet 40 milliGRAM(s) Oral before breakfast  polyethylene glycol 3350 17 Gram(s) Oral daily  senna 2 Tablet(s) Oral at bedtime  spironolactone 12.5 milliGRAM(s) Oral daily  torsemide 100 milliGRAM(s) Oral daily      LABS: All Labs Reviewed:                        9.0    7.13  )-----------( 322      ( 01 Aug 2019 08:31 )             29.0     08-01    141  |  102  |  56<H>  ----------------------------<  173<H>  3.6   |  22  |  1.65<H>    Ca    9.7      01 Aug 2019 06:46  Mg     2.0     07-31    TPro  7.8  /  Alb  3.8  /  TBili  0.2  /  DBili  <0.1  /  AST  37  /  ALT  33  /  AlkPhos  111  08-01          Blood Culture:   Urine Culture      RADIOLOGY/EKG:    ASSESSMENT AND PLAN:    DVT PPX:    ADVANCED DIRECTIVE:    DISPOSITION: CHIEF COMPLAINT:  patient awake and verbal still feel tired and weak cardiology follow-up appreciated  SUBJECTIVE:     REVIEW OF SYSTEMS:    CONSTITUTIONAL: ( x )  weakness,  (  ) fevers or chills  EYES/ENT: (  )visual changes;     NECK: (  ) pain or stiffness  RESPIRATORY:   (  )cough, wheezing, hemoptysis;  ( x ) shortness of breath  CARDIOVASCULAR:  (  )chest pain or palpitations  GASTROINTESTINAL:   (  )abdominal or epigastric pain.  (  ) nausea, vomiting, or hematemesis;   (   ) diarrhea or constipation.   GENITOURINARY:   (    ) dysuria, frequency or hematuria  NEUROLOGICAL:  (   ) numbness or weakness   All other review of systems is negative unless indicated above    Vital Signs Last 24 Hrs  T(C): 36.6 (01 Aug 2019 04:00), Max: 36.8 (31 Jul 2019 19:35)  T(F): 97.9 (01 Aug 2019 04:00), Max: 98.2 (31 Jul 2019 19:35)  HR: 67 (01 Aug 2019 05:40) (65 - 71)  BP: 119/65 (01 Aug 2019 05:40) (109/58 - 124/61)  BP(mean): --  RR: 18 (01 Aug 2019 04:00) (16 - 18)  SpO2: 98% (01 Aug 2019 04:00) (98% - 100%)    I&O's Summary    31 Jul 2019 07:01  -  01 Aug 2019 07:00  --------------------------------------------------------  IN: 440 mL / OUT: 600 mL / NET: -160 mL    01 Aug 2019 07:01  -  01 Aug 2019 10:59  --------------------------------------------------------  IN: 220 mL / OUT: 200 mL / NET: 20 mL        CAPILLARY BLOOD GLUCOSE      POCT Blood Glucose.: 174 mg/dL (01 Aug 2019 07:45)  POCT Blood Glucose.: 329 mg/dL (31 Jul 2019 21:14)  POCT Blood Glucose.: 132 mg/dL (31 Jul 2019 16:14)  POCT Blood Glucose.: 219 mg/dL (31 Jul 2019 11:32)      PHYSICAL EXAM:    Constitutional:  (  x ) NAD,   (  x )awake and alert  HEENT: PERR, EOMI,    Neck: Soft and supple, No LAD, No JVD  Respiratory:  ( x   Breath sounds are clear bilaterally,    (   ) wheezing, rales or rhonchi  Cardiovascular:     ( x  )S1 and S2, regular rate and rhythm, no Murmurs, gallops or rubs  Gastrointestinal:  (  x )Bowel Sounds present, soft,   (  )nontender, nondistended,    Extremities:    (  ) peripheral edema  Vascular: 2+ peripheral pulses  Neurological:    (  x  )A/O x 3,   (  ) focal deficits  Musculoskeletal:    (  x )  normal strength b/l upper  (     ) normal  lower extremities  Skin: No rashes    MEDICATIONS:  MEDICATIONS  (STANDING):  acetaminophen  IVPB .. 1000 milliGRAM(s) IV Intermittent once  aspirin enteric coated 81 milliGRAM(s) Oral daily  atorvastatin 40 milliGRAM(s) Oral at bedtime  chlorhexidine 2% Cloths 1 Application(s) Topical daily  clopidogrel Tablet 75 milliGRAM(s) Oral daily  dextrose 5%. 1000 milliLiter(s) (50 mL/Hr) IV Continuous <Continuous>  dextrose 50% Injectable 12.5 Gram(s) IV Push once  dextrose 50% Injectable 25 Gram(s) IV Push once  dextrose 50% Injectable 25 Gram(s) IV Push once  digoxin     Tablet 0.125 milliGRAM(s) Oral daily  docusate sodium 100 milliGRAM(s) Oral two times a day  hydrALAZINE 50 milliGRAM(s) Oral every 8 hours  insulin glargine Injectable (LANTUS) 60 Unit(s) SubCutaneous at bedtime  insulin lispro (HumaLOG) corrective regimen sliding scale   SubCutaneous three times a day before meals  insulin lispro (HumaLOG) corrective regimen sliding scale   SubCutaneous at bedtime  insulin lispro Injectable (HumaLOG) 30 Unit(s) SubCutaneous three times a day before meals  isosorbide   dinitrate Tablet (ISORDIL) 30 milliGRAM(s) Oral three times a day  levothyroxine 50 MICROGram(s) Oral daily  lidocaine   Patch 1 Patch Transdermal daily  melatonin 3 milliGRAM(s) Oral at bedtime  metoprolol tartrate 12.5 milliGRAM(s) Oral two times a day  montelukast 10 milliGRAM(s) Oral daily  pantoprazole    Tablet 40 milliGRAM(s) Oral before breakfast  polyethylene glycol 3350 17 Gram(s) Oral daily  senna 2 Tablet(s) Oral at bedtime  spironolactone 12.5 milliGRAM(s) Oral daily  torsemide 100 milliGRAM(s) Oral daily      LABS: All Labs Reviewed:                        9.0    7.13  )-----------( 322      ( 01 Aug 2019 08:31 )             29.0     08-01    141  |  102  |  56<H>  ----------------------------<  173<H>  3.6   |  22  |  1.65<H>    Ca    9.7      01 Aug 2019 06:46  Mg     2.0     07-31    TPro  7.8  /  Alb  3.8  /  TBili  0.2  /  DBili  <0.1  /  AST  37  /  ALT  33  /  AlkPhos  111  08-01          Blood Culture:   Urine Culture      RADIOLOGY/EKG:    ASSESSMENT AND PLAN:  patient with congestive heart failure CADcardiology follow-up appreciated patient on Demadex 100 mg tolerating well continue Aldactone 25 mg monitor patient for the next 2-3 days we weighed the patient daily basis and if she remains stable to be discharged home as per family wishes.  there are aware  she is a high risk patient for readmission and possible intubation in the outpatient due to her severe cardiomyopathy  DVT PPX:    ADVANCED DIRECTIVE:    DISPOSITION:

## 2019-08-02 LAB
ANION GAP SERPL CALC-SCNC: 15 MMOL/L — SIGNIFICANT CHANGE UP (ref 5–17)
BUN SERPL-MCNC: 59 MG/DL — HIGH (ref 7–23)
CALCIUM SERPL-MCNC: 9.5 MG/DL — SIGNIFICANT CHANGE UP (ref 8.4–10.5)
CHLORIDE SERPL-SCNC: 99 MMOL/L — SIGNIFICANT CHANGE UP (ref 96–108)
CO2 SERPL-SCNC: 23 MMOL/L — SIGNIFICANT CHANGE UP (ref 22–31)
CREAT SERPL-MCNC: 1.73 MG/DL — HIGH (ref 0.5–1.3)
GLUCOSE BLDC GLUCOMTR-MCNC: 177 MG/DL — HIGH (ref 70–99)
GLUCOSE BLDC GLUCOMTR-MCNC: 198 MG/DL — HIGH (ref 70–99)
GLUCOSE BLDC GLUCOMTR-MCNC: 269 MG/DL — HIGH (ref 70–99)
GLUCOSE BLDC GLUCOMTR-MCNC: 297 MG/DL — HIGH (ref 70–99)
GLUCOSE SERPL-MCNC: 296 MG/DL — HIGH (ref 70–99)
HCT VFR BLD CALC: 30.9 % — LOW (ref 34.5–45)
HGB BLD-MCNC: 9.5 G/DL — LOW (ref 11.5–15.5)
MCHC RBC-ENTMCNC: 28.1 PG — SIGNIFICANT CHANGE UP (ref 27–34)
MCHC RBC-ENTMCNC: 30.7 GM/DL — LOW (ref 32–36)
MCV RBC AUTO: 91.4 FL — SIGNIFICANT CHANGE UP (ref 80–100)
PLATELET # BLD AUTO: 308 K/UL — SIGNIFICANT CHANGE UP (ref 150–400)
POTASSIUM SERPL-MCNC: 3.7 MMOL/L — SIGNIFICANT CHANGE UP (ref 3.5–5.3)
POTASSIUM SERPL-SCNC: 3.7 MMOL/L — SIGNIFICANT CHANGE UP (ref 3.5–5.3)
RBC # BLD: 3.38 M/UL — LOW (ref 3.8–5.2)
RBC # FLD: 16.3 % — HIGH (ref 10.3–14.5)
SODIUM SERPL-SCNC: 137 MMOL/L — SIGNIFICANT CHANGE UP (ref 135–145)
WBC # BLD: 6.84 K/UL — SIGNIFICANT CHANGE UP (ref 3.8–10.5)
WBC # FLD AUTO: 6.84 K/UL — SIGNIFICANT CHANGE UP (ref 3.8–10.5)

## 2019-08-02 PROCEDURE — 99233 SBSQ HOSP IP/OBS HIGH 50: CPT | Mod: GC

## 2019-08-02 RX ORDER — INSULIN LISPRO 100/ML
30 VIAL (ML) SUBCUTANEOUS
Refills: 0 | Status: DISCONTINUED | OUTPATIENT
Start: 2019-08-02 | End: 2019-08-03

## 2019-08-02 RX ORDER — INSULIN GLARGINE 100 [IU]/ML
64 INJECTION, SOLUTION SUBCUTANEOUS AT BEDTIME
Refills: 0 | Status: DISCONTINUED | OUTPATIENT
Start: 2019-08-02 | End: 2019-08-03

## 2019-08-02 RX ORDER — INSULIN LISPRO 100/ML
36 VIAL (ML) SUBCUTANEOUS
Refills: 0 | Status: DISCONTINUED | OUTPATIENT
Start: 2019-08-02 | End: 2019-08-05

## 2019-08-02 RX ADMIN — Medication 3: at 08:08

## 2019-08-02 RX ADMIN — POLYETHYLENE GLYCOL 3350 17 GRAM(S): 17 POWDER, FOR SOLUTION ORAL at 12:37

## 2019-08-02 RX ADMIN — CHLORHEXIDINE GLUCONATE 1 APPLICATION(S): 213 SOLUTION TOPICAL at 22:22

## 2019-08-02 RX ADMIN — Medication 100 MILLIGRAM(S): at 17:17

## 2019-08-02 RX ADMIN — Medication 50 MILLIGRAM(S): at 06:53

## 2019-08-02 RX ADMIN — SPIRONOLACTONE 25 MILLIGRAM(S): 25 TABLET, FILM COATED ORAL at 06:53

## 2019-08-02 RX ADMIN — ATORVASTATIN CALCIUM 40 MILLIGRAM(S): 80 TABLET, FILM COATED ORAL at 21:53

## 2019-08-02 RX ADMIN — ISOSORBIDE DINITRATE 30 MILLIGRAM(S): 5 TABLET ORAL at 14:38

## 2019-08-02 RX ADMIN — ISOSORBIDE DINITRATE 30 MILLIGRAM(S): 5 TABLET ORAL at 23:25

## 2019-08-02 RX ADMIN — Medication 3: at 12:32

## 2019-08-02 RX ADMIN — Medication 12.5 MILLIGRAM(S): at 06:53

## 2019-08-02 RX ADMIN — Medication 0.12 MILLIGRAM(S): at 06:53

## 2019-08-02 RX ADMIN — Medication 30 UNIT(S): at 08:09

## 2019-08-02 RX ADMIN — Medication 100 MILLIGRAM(S): at 06:53

## 2019-08-02 RX ADMIN — Medication 50 MILLIGRAM(S): at 14:37

## 2019-08-02 RX ADMIN — LIDOCAINE 1 PATCH: 4 CREAM TOPICAL at 22:13

## 2019-08-02 RX ADMIN — MONTELUKAST 10 MILLIGRAM(S): 4 TABLET, CHEWABLE ORAL at 12:37

## 2019-08-02 RX ADMIN — Medication 30 UNIT(S): at 12:32

## 2019-08-02 RX ADMIN — ISOSORBIDE DINITRATE 30 MILLIGRAM(S): 5 TABLET ORAL at 06:54

## 2019-08-02 RX ADMIN — CLOPIDOGREL BISULFATE 75 MILLIGRAM(S): 75 TABLET, FILM COATED ORAL at 12:37

## 2019-08-02 RX ADMIN — Medication 1: at 17:16

## 2019-08-02 RX ADMIN — Medication 50 MILLIGRAM(S): at 23:25

## 2019-08-02 RX ADMIN — INSULIN GLARGINE 64 UNIT(S): 100 INJECTION, SOLUTION SUBCUTANEOUS at 22:01

## 2019-08-02 RX ADMIN — Medication 3 MILLIGRAM(S): at 21:53

## 2019-08-02 RX ADMIN — SENNA PLUS 2 TABLET(S): 8.6 TABLET ORAL at 22:02

## 2019-08-02 RX ADMIN — PANTOPRAZOLE SODIUM 40 MILLIGRAM(S): 20 TABLET, DELAYED RELEASE ORAL at 08:09

## 2019-08-02 RX ADMIN — Medication 36 UNIT(S): at 17:16

## 2019-08-02 RX ADMIN — Medication 12.5 MILLIGRAM(S): at 17:18

## 2019-08-02 RX ADMIN — Medication 81 MILLIGRAM(S): at 12:37

## 2019-08-02 RX ADMIN — Medication 50 MICROGRAM(S): at 06:53

## 2019-08-02 NOTE — PROGRESS NOTE ADULT - ASSESSMENT
Pt is a 72 yo woman with PMHx of HTN, T2DM, CAD s/p CABG (LIMA to LAD, SVG to OM, SVG to PDA 2014 at [facility]), non-dilated ICM (EF 20-25%), severe mitral regurgitation s/p mitral clip (6/13/19 Dr. Newman), severe pulm HTN, CKD, hypothyroidism admitted with worsening SOB.    A/P:  MERVIN  Likely sec to cardiogenic shock  Renal function has improved and now remains stable  Pt non-oliguric  Continue diuretic therapy per cardiology. Pt currently on Torsemide and Spironolactone with symptomatic improvement   Optimize glucose control  Monitor renal function   Avoid further nephrotoxics, NSAIDS, RCA    CKD stage 4:  baseline Scr 1.8-2.0. Scr stable at 1.73 today.   Renal function fluctuates sec to CHF  Monitor renal function at present    SOB:  in setting of HF. Improving on diuretic therapy.   Continue diuretics per cardiology    Acidosis   Sec to Renal failure  Improved and is currently at goal   Monitor serum Co2 at present    Hypokalemia:  in the setting of diuretic therapy.   Improved with supplementation     Anemia:   Hb below goal.   Pt with iron deficiency anemia.   Consider adding oral iron replacement therapy

## 2019-08-02 NOTE — PROGRESS NOTE ADULT - ATTENDING COMMENTS
73 year old woman with history of remote CABG, MitraClip 6 weeks ago sent to ER from Rehab complaining of shortness of breath and lower extremity edema and hypoxemia. On exam mild volume overload, improving. No further recurrent SVT. Conclude that episodes are atrial tachycardia and if recurring, amiodarone likely best option for management. However no recurrence in few days. Responds to high dose IV furosemide, but volume overload recurs if switch to oral furosemide. Thus switched to high dose torsemide and aldosterone antagonist and monitoring volume status closely, which seems to be remaining favorable.

## 2019-08-02 NOTE — PROGRESS NOTE ADULT - ASSESSMENT
72 yo woman with PMHx of HTN, T2DM (A1c 7.4%), CAD s/p CABG (LIMA to LAD, SVG to OM, SVG to PDA 2014 at [facility]), non-dilated ICM (EF 20-25%), severe mitral regurgitation s/p mitral clip (6/13/19 Dr. Newman), severe pulm HTN, CKD, hypothyroidism, who is presenting to ED after experiencing worsening SO[facility] and [facility] Of note, pt was recently discharged on 6/19 after having mitral clip procedure completed. She initially required BiPAP with improvement in her respiratory status following diuresis. Initiated on vasopressors and inotropes in CCU, which were subsequently weaned off. Infectious work up was negative.    #HFrEF likely 2/2 ICM with MR s/p alonso clip  - Pt tolerating torsemide 100 mg daily for now. Would aim for this on d/c as well.    - Check daily weights, Is and Os, and daily lactates.   - C/w hydralazine 50 mg TID and isordil 30 TID  - c/w spironolactone 25 mg.   - No further cardiac testing at this time.    - Metoprolol should be switched to long acting.     #CAD  - C/w ASA and statin   - Pt with chest pain, but also with end stage cardiomyopathy and known coronary disease that cannot be intervened on. She should not have trops checked. No chest pain this morning.     #SVT likely Atrial tach v Atrial flutter   - Amio initially not given due to elevated LFTs likely in the setting of congestion.   - recheck LFTs.   - Monitor on tele.   - C/w Dig and metoprolol at current doses.   - Check dig level   - If pt has SVT again can consider amio.       Surendra Marshall MD  Cardiology Fellow - PGY 5  Text or Call: [phone]  For all New Consults and Questions:  [email or web address]   Login: [email or web address] 72 yo woman with PMHx of HTN, T2DM (A1c 7.4%), CAD s/p CABG (LIMA to LAD, SVG to OM, SVG to PDA 2014 at [facility]), non-dilated ICM (EF 20-25%), severe mitral regurgitation s/p mitral clip (6/13/19 Dr. Newman), severe pulm HTN, CKD, hypothyroidism, who is presenting to ED after experiencing [facility] SO[facility] and [facility] Of note, pt was recently discharged on 6/19 after having mitral clip procedure completed. She initially required BiPAP with improvement in her respiratory status following diuresis. Initiated on vasopressors and inotropes in CCU, which were subsequently weaned off. Infectious work up was negative.    #HFrEF likely 2/2 ICM with MR s/p alonso clip  - Pt tolerating torsemide 100 mg daily for now. Would aim for this on d/c as well.    - Check daily weights, Is and Os, and daily lactates.   - C/w hydralazine 50 mg TID and isordil 30 TID  - c/w spironolactone 25 mg.   - No further cardiac testing at this time.    - Metoprolol should be switched to long acting.     #CAD  - C/w ASA and statin   - Pt with chest pain, but also with end stage cardiomyopathy, coronary disease, had MitraClip and no further intervention feasible. She should not have trops checked. No chest pain this morning.     #SVT likely Atrial tach v Atrial flutter   - Amio initially not given due to elevated LFTs likely in the setting of congestion.   - recheck LFTs.   - Monitor on tele.   - C/w Dig and metoprolol at current doses.   - Check dig level   - If pt has SVT again can consider amio.       Surendra Marshall MD  Cardiology Fellow - PGY 5  Text or Call: [phone]  For all New Consults and Questions:  [email or web address]   Login: [email or web address]

## 2019-08-02 NOTE — PROGRESS NOTE ADULT - SUBJECTIVE AND OBJECTIVE BOX
CHIEF COMPLAINT:    SUBJECTIVE:     REVIEW OF SYSTEMS:    CONSTITUTIONAL: (  )  weakness,  (  ) fevers or chills  EYES/ENT: (  )visual changes;     NECK: (  ) pain or stiffness  RESPIRATORY:   (  )cough, wheezing, hemoptysis;  (  ) shortness of breath  CARDIOVASCULAR:  (  )chest pain or palpitations  GASTROINTESTINAL:   (  )abdominal or epigastric pain.  (  ) nausea, vomiting, or hematemesis;   (   ) diarrhea or constipation.   GENITOURINARY:   (    ) dysuria, frequency or hematuria  NEUROLOGICAL:  (   ) numbness or weakness   All other review of systems is negative unless indicated above    Vital Signs Last 24 Hrs  T(C): 36.6 (02 Aug 2019 04:45), Max: 36.7 (01 Aug 2019 20:03)  T(F): 97.9 (02 Aug 2019 04:45), Max: 98 (01 Aug 2019 20:03)  HR: 65 (02 Aug 2019 06:51) (64 - 82)  BP: 117/63 (02 Aug 2019 06:51) (107/57 - 125/62)  BP(mean): --  RR: 18 (02 Aug 2019 04:45) (16 - 18)  SpO2: 100% (02 Aug 2019 04:45) (94% - 100%)    I&O's Summary    01 Aug 2019 07:01  -  02 Aug 2019 07:00  --------------------------------------------------------  IN: 700 mL / OUT: 700 mL / NET: 0 mL        CAPILLARY BLOOD GLUCOSE      POCT Blood Glucose.: 297 mg/dL (02 Aug 2019 07:41)  POCT Blood Glucose.: 453 mg/dL (01 Aug 2019 21:42)  POCT Blood Glucose.: 280 mg/dL (01 Aug 2019 16:19)  POCT Blood Glucose.: 144 mg/dL (01 Aug 2019 11:51)      PHYSICAL EXAM:    Constitutional:  (   ) NAD,   (   )awake and alert  HEENT: PERR, EOMI,    Neck: Soft and supple, No LAD, No JVD  Respiratory:  (    Breath sounds are clear bilaterally,    (   ) wheezing, rales or rhonchi  Cardiovascular:     (   )S1 and S2, regular rate and rhythm, no Murmurs, gallops or rubs  Gastrointestinal:  (   )Bowel Sounds present, soft,   (  )nontender, nondistended,    Extremities:    (  ) peripheral edema  Vascular: 2+ peripheral pulses  Neurological:    (    )A/O x 3,   (  ) focal deficits  Musculoskeletal:    (   )  normal strength b/l upper  (     ) normal  lower extremities  Skin: No rashes    MEDICATIONS:  MEDICATIONS  (STANDING):  acetaminophen  IVPB .. 1000 milliGRAM(s) IV Intermittent once  aspirin enteric coated 81 milliGRAM(s) Oral daily  atorvastatin 40 milliGRAM(s) Oral at bedtime  chlorhexidine 2% Cloths 1 Application(s) Topical daily  clopidogrel Tablet 75 milliGRAM(s) Oral daily  dextrose 5%. 1000 milliLiter(s) (50 mL/Hr) IV Continuous <Continuous>  dextrose 50% Injectable 12.5 Gram(s) IV Push once  dextrose 50% Injectable 25 Gram(s) IV Push once  dextrose 50% Injectable 25 Gram(s) IV Push once  digoxin     Tablet 0.125 milliGRAM(s) Oral daily  docusate sodium 100 milliGRAM(s) Oral two times a day  hydrALAZINE 50 milliGRAM(s) Oral every 8 hours  insulin glargine Injectable (LANTUS) 60 Unit(s) SubCutaneous at bedtime  insulin lispro (HumaLOG) corrective regimen sliding scale   SubCutaneous three times a day before meals  insulin lispro (HumaLOG) corrective regimen sliding scale   SubCutaneous at bedtime  insulin lispro Injectable (HumaLOG) 30 Unit(s) SubCutaneous three times a day before meals  isosorbide   dinitrate Tablet (ISORDIL) 30 milliGRAM(s) Oral three times a day  levothyroxine 50 MICROGram(s) Oral daily  lidocaine   Patch 1 Patch Transdermal daily  melatonin 3 milliGRAM(s) Oral at bedtime  metoprolol tartrate 12.5 milliGRAM(s) Oral two times a day  montelukast 10 milliGRAM(s) Oral daily  pantoprazole    Tablet 40 milliGRAM(s) Oral before breakfast  polyethylene glycol 3350 17 Gram(s) Oral daily  senna 2 Tablet(s) Oral at bedtime  spironolactone 25 milliGRAM(s) Oral daily  torsemide 100 milliGRAM(s) Oral daily      LABS: All Labs Reviewed:                        9.0    7.13  )-----------( 322      ( 01 Aug 2019 08:31 )             29.0     08-02    137  |  99  |  59<H>  ----------------------------<  296<H>  3.7   |  23  |  1.73<H>    Ca    9.5      02 Aug 2019 06:11    TPro  7.8  /  Alb  3.8  /  TBili  0.2  /  DBili  <0.1  /  AST  37  /  ALT  33  /  AlkPhos  111  08-01          Blood Culture:   Urine Culture      RADIOLOGY/EKG:    ASSESSMENT AND PLAN:    DVT PPX:    ADVANCED DIRECTIVE:    DISPOSITION: CHIEF COMPLAINT:  Patient condition better today awake and verbal without complaint weakness  SUBJECTIVE:     REVIEW OF SYSTEMS:    CONSTITUTIONAL: (  )  weakness,  (  ) fevers or chills  EYES/ENT: (  )visual changes;     NECK: (  ) pain or stiffness  RESPIRATORY:   (  )cough, wheezing, hemoptysis;  ( x ) shortness of breath  CARDIOVASCULAR:  (  )chest pain or palpitations  GASTROINTESTINAL:   (  )abdominal or epigastric pain.  (  ) nausea, vomiting, or hematemesis;   (   ) diarrhea or constipation.   GENITOURINARY:   (    ) dysuria, frequency or hematuria  NEUROLOGICAL:  (   ) numbness or weakness   All other review of systems is negative unless indicated above    Vital Signs Last 24 Hrs  T(C): 36.6 (02 Aug 2019 04:45), Max: 36.7 (01 Aug 2019 20:03)  T(F): 97.9 (02 Aug 2019 04:45), Max: 98 (01 Aug 2019 20:03)  HR: 65 (02 Aug 2019 06:51) (64 - 82)  BP: 117/63 (02 Aug 2019 06:51) (107/57 - 125/62)  BP(mean): --  RR: 18 (02 Aug 2019 04:45) (16 - 18)  SpO2: 100% (02 Aug 2019 04:45) (94% - 100%)    I&O's Summary    01 Aug 2019 07:01  -  02 Aug 2019 07:00  --------------------------------------------------------  IN: 700 mL / OUT: 700 mL / NET: 0 mL        CAPILLARY BLOOD GLUCOSE      POCT Blood Glucose.: 297 mg/dL (02 Aug 2019 07:41)  POCT Blood Glucose.: 453 mg/dL (01 Aug 2019 21:42)  POCT Blood Glucose.: 280 mg/dL (01 Aug 2019 16:19)  POCT Blood Glucose.: 144 mg/dL (01 Aug 2019 11:51)      PHYSICAL EXAM:    Constitutional:  ( x  ) NAD,   ( x  )awake and alert  HEENT: PERR, EOMI,    Neck: Soft and supple, No LAD, No JVD  Respiratory:  (   x Breath sounds are clear bilaterally,    (   ) wheezing, rales or rhonchi  Cardiovascular:     ( x  )S1 and S2, regular rate and rhythm, no Murmurs, gallops or rubs  Gastrointestinal:  (  x )Bowel Sounds present, soft,   (  )nontender, nondistended,    Extremities:    (  ) peripheral edema  Vascular: 2+ peripheral pulses  Neurological:    (  x  )A/O x 3,   (  ) focal deficits  Musculoskeletal:    (x   )  normal strength b/l upper  (     ) normal  lower extremities  Skin: No rashes    MEDICATIONS:  MEDICATIONS  (STANDING):  acetaminophen  IVPB .. 1000 milliGRAM(s) IV Intermittent once  aspirin enteric coated 81 milliGRAM(s) Oral daily  atorvastatin 40 milliGRAM(s) Oral at bedtime  chlorhexidine 2% Cloths 1 Application(s) Topical daily  clopidogrel Tablet 75 milliGRAM(s) Oral daily  dextrose 5%. 1000 milliLiter(s) (50 mL/Hr) IV Continuous <Continuous>  dextrose 50% Injectable 12.5 Gram(s) IV Push once  dextrose 50% Injectable 25 Gram(s) IV Push once  dextrose 50% Injectable 25 Gram(s) IV Push once  digoxin     Tablet 0.125 milliGRAM(s) Oral daily  docusate sodium 100 milliGRAM(s) Oral two times a day  hydrALAZINE 50 milliGRAM(s) Oral every 8 hours  insulin glargine Injectable (LANTUS) 60 Unit(s) SubCutaneous at bedtime  insulin lispro (HumaLOG) corrective regimen sliding scale   SubCutaneous three times a day before meals  insulin lispro (HumaLOG) corrective regimen sliding scale   SubCutaneous at bedtime  insulin lispro Injectable (HumaLOG) 30 Unit(s) SubCutaneous three times a day before meals  isosorbide   dinitrate Tablet (ISORDIL) 30 milliGRAM(s) Oral three times a day  levothyroxine 50 MICROGram(s) Oral daily  lidocaine   Patch 1 Patch Transdermal daily  melatonin 3 milliGRAM(s) Oral at bedtime  metoprolol tartrate 12.5 milliGRAM(s) Oral two times a day  montelukast 10 milliGRAM(s) Oral daily  pantoprazole    Tablet 40 milliGRAM(s) Oral before breakfast  polyethylene glycol 3350 17 Gram(s) Oral daily  senna 2 Tablet(s) Oral at bedtime  spironolactone 25 milliGRAM(s) Oral daily  torsemide 100 milliGRAM(s) Oral daily      LABS: All Labs Reviewed:                        9.0    7.13  )-----------( 322      ( 01 Aug 2019 08:31 )             29.0     08-02    137  |  99  |  59<H>  ----------------------------<  296<H>  3.7   |  23  |  1.73<H>    Ca    9.5      02 Aug 2019 06:11    TPro  7.8  /  Alb  3.8  /  TBili  0.2  /  DBili  <0.1  /  AST  37  /  ALT  33  /  AlkPhos  111  08-01          Blood Culture:   Urine Culture      RADIOLOGY/EKG:    ASSESSMENT AND PLAN:   72 yo woman with PMHx of HTN, T2DM (A1c 7.4%), CAD s/p CABG (LIMA to LAD, SVG to OM, SVG to PDA 2014 at [facility]), non-dilated ICM (EF 20-25%), severe mitral regurgitation s/p mitral clip (6/13/19 Dr. Newman), severe pulm HTN, CKD, hypothyroidism, who is presenting to ED after experiencing worsening SOB and intermittent chest pain for 1 week at [facility]. Of note, pt was recently discharged on 6/19 after having mitral clip procedure completed. She initially required BiPAP with improvement in her respiratory status following diuresis. Initiated on vasopressors and inotropes in CCU, which were subsequently weaned off. Infectious work up was negative.    #HFrEF likely 2/2 ICM with MR s/p alonso clip  - Pt tolerating torsemide 100 mg daily for now. Would aim for this on d/c as well.    - Check daily weights, Is and Os, and daily lactates.   - C/w hydralazine 50 mg TID and isordil 30 TID  - c/w spironolactone 25 mg.   - No further cardiac testing at this time.    -  Continue beta-blocker  #CAD  - C/w ASA and statin   - Pt with chest pain, but also with end stage cardiomyopathy, coronary disease, had MitraClip and no further intervention feasible. She should not have trops checked. No chest pain this morning.     #SVT likely Atrial tach v Atrial flutter   - Amio initially not given due to elevated LFTs likely in the setting of congestion.   - recheck LFTs.   - Monitor on tele.   - C/w Dig and metoprolol at current doses.  We will check Level     - If pt has SVT again can consider amio.  DVT PPX:    ADVANCED DIRECTIVE:    DISPOSITION: Discharge home if remains stable patient unfortunately gained 5 pounds since changing IV Lasix

## 2019-08-02 NOTE — PROGRESS NOTE ADULT - ASSESSMENT
Assessment  DMT2: 73y Female with DM T2 with hyperglycemia on insulin, blood sugars running High At [facility]  [phone]  ,?? Food from home? Juice??  CAD: S/P cabg On medications, stable, monitored.  Hypothyroidism:  On synthroid 50  mcg po daily, asymptomatic.  HTN: Controlled, On med.  HLD; on statin  CKD: Monitor labs/BMP,         Plan:   DMT2:   Increase  Lantus to 64 units qhs, Continue  Humalog to 30  units before breakfast and Lunch and 36 units pre-dinner   in addition to correction scale coverage, monitor FS will FU  Patient counseled for compliance with consistent low carb diet.  CAD: S/P cabg On medications, stable, monitored.  Hypothyroidism:  On synthroid 50  mcg po daily, asymptomatic. F/U tsh as outpatient  HTN: Continue treatment, monitoring, Primary team FU  HLD; on statin  CKD: Continue monitoring labs, renal FU  Thank you for consult will F/U  erika Woodard MD  [phone]

## 2019-08-02 NOTE — PROGRESS NOTE ADULT - SUBJECTIVE AND OBJECTIVE BOX
Patient seen and examined at bedside.    Overnight Events: No acute events overnight.       REVIEW OF SYSTEMS:  Constitutional:     [ ] negative [ ] fevers [ ] chills [ ] weight loss [ ] weight gain  HEENT:                  [ ] negative [ ] dry eyes [ ] eye irritation [ ] postnasal drip [ ] nasal congestion  CV:                         [ ] negative  [ ] chest pain [ ] orthopnea [ ] palpitations [ ] murmur  Resp:                     [ ] negative [ ] cough [ ] shortness of breath [ ] dyspnea [ ] wheezing [ ] sputum [ ]hemoptysis  GI:                          [ ] negative [ ] nausea [ ] vomiting [ ] diarrhea [ ] constipation [ ] abd pain [ ] dysphagia   :                        [ ] negative [ ] dysuria [ ] nocturia [ ] hematuria [ ] increased urinary frequency  Musculoskeletal: [ ] negative [ ] back pain [ ] myalgias [ ] arthralgias [ ] fracture  Skin:                       [ ] negative [ ] rash [ ] itch  Neurological:        [ ] negative [ ] headache [ ] dizziness [ ] syncope [ ] weakness [ ] numbness  Psychiatric:           [ ] negative [ ] anxiety [ ] depression  Endocrine:            [ ] negative [ ] diabetes [ ] thyroid problem  Heme/Lymph:      [ ] negative [ ] anemia [ ] bleeding problem  Allergic/Immune: [ ] negative [ ] itchy eyes [ ] nasal discharge [ ] hives [ ] angioedema    [x ] All other systems negative  [ ] Unable to assess ROS due to    Current Meds:  acetaminophen   Tablet .. 650 milliGRAM(s) Oral every 6 hours PRN  acetaminophen  IVPB .. 1000 milliGRAM(s) IV Intermittent once  aspirin enteric coated 81 milliGRAM(s) Oral daily  atorvastatin 40 milliGRAM(s) Oral at bedtime  chlorhexidine 2% Cloths 1 Application(s) Topical daily  clopidogrel Tablet 75 milliGRAM(s) Oral daily  dextrose 40% Gel 15 Gram(s) Oral once PRN  dextrose 5%. 1000 milliLiter(s) IV Continuous <Continuous>  dextrose 50% Injectable 12.5 Gram(s) IV Push once  dextrose 50% Injectable 25 Gram(s) IV Push once  dextrose 50% Injectable 25 Gram(s) IV Push once  digoxin     Tablet 0.125 milliGRAM(s) Oral daily  docusate sodium 100 milliGRAM(s) Oral two times a day  glucagon  Injectable 1 milliGRAM(s) IntraMuscular once PRN  hydrALAZINE 50 milliGRAM(s) Oral every 8 hours  insulin glargine Injectable (LANTUS) 60 Unit(s) SubCutaneous at bedtime  insulin lispro (HumaLOG) corrective regimen sliding scale   SubCutaneous three times a day before meals  insulin lispro (HumaLOG) corrective regimen sliding scale   SubCutaneous at bedtime  insulin lispro Injectable (HumaLOG) 30 Unit(s) SubCutaneous three times a day before meals  isosorbide   dinitrate Tablet (ISORDIL) 30 milliGRAM(s) Oral three times a day  levothyroxine 50 MICROGram(s) Oral daily  lidocaine   Patch 1 Patch Transdermal daily  melatonin 3 milliGRAM(s) Oral at bedtime  metoprolol tartrate 12.5 milliGRAM(s) Oral two times a day  montelukast 10 milliGRAM(s) Oral daily  pantoprazole    Tablet 40 milliGRAM(s) Oral before breakfast  polyethylene glycol 3350 17 Gram(s) Oral daily  senna 2 Tablet(s) Oral at bedtime  spironolactone 25 milliGRAM(s) Oral daily  torsemide 100 milliGRAM(s) Oral daily      PAST MEDICAL & SURGICAL HISTORY:  GERD (gastroesophageal reflux disease)  CAD (coronary artery disease): s/p CABG  Hypothyroidism  Hyperlipidemia  Diabetes mellitus, type 2  S/P CABG (coronary artery bypass graft)      Vitals:  T(F): 97.9 (08-02), Max: 98 (08-01)  HR: 65 (08-02) (64 - 82)  BP: 117/63 (08-02) (107/57 - 125/62)  RR: 18 (08-02)  SpO2: 100% (08-02)  I&O's Summary    01 Aug 2019 07:01  -  02 Aug 2019 07:00  --------------------------------------------------------  IN: 700 mL / OUT: 700 mL / NET: 0 mL        Physical Exam:  Appearance: No acute distress; well appearing  Eyes: PERRL, EOMI, pink conjunctiva  HENT: Normal oral mucosa  Cardiovascular: RRR, S1, S2, no murmurs, rubs, or gallops; no edema; no JVD  Respiratory: Clear to auscultation bilaterally  Gastrointestinal: soft, non-tender, non-distended with normal bowel sounds  Musculoskeletal: No clubbing; no joint deformity   Neurologic: Non-focal  Lymphatic: No lymphadenopathy  Psychiatry: AAOx3, mood & affect appropriate  Skin: No rashes, ecchymoses, or cyanosis                          9.5    6.84  )-----------( 308      ( 02 Aug 2019 09:00 )             30.9     08-02    137  |  99  |  59<H>  ----------------------------<  296<H>  3.7   |  23  |  1.73<H>    Ca    9.5      02 Aug 2019 06:11    TPro  7.8  /  Alb  3.8  /  TBili  0.2  /  DBili  <0.1  /  AST  37  /  ALT  33  /  AlkPhos  111  08-01          New ECG(s): Personally reviewed    Echo:  < from: TTE with Doppler (w/Cont) (07.09.19 @ 07:49) >  Conclusions:  1. Severe global left ventricular systolic dysfunction.  Endocardial visualization enhanced with intravenous  injection of Ultrasonic Enhancing Agent (Definity).  2. The right ventricle is not well visualized; grossly  normal right ventricular systolic function.  3. Estimated right ventricular systolic pressure equals 67  mm Hg, assuming right atrial pressure equals 15 mm Hg,  consistent with severe pulmonary hypertension.  4. Normal tricuspid valve. Mild-moderate tricuspid  regurgitation.    < end of copied text >      Interpretation of Telemetry: sinus 50s

## 2019-08-02 NOTE — PROGRESS NOTE ADULT - SUBJECTIVE AND OBJECTIVE BOX
[facility]   MD GONZALEZ SHAH D.O, PA    TELEPHONE NUMBERS:  OFFICE: [phone]  DR. SANTOYO CELL: [phone]  JUSTEN FIELD CELL: [phone]  DR. RIDER CELL:  [phone]    RENAL FOLLOW UP NOTE  --------------------------------------------------------------------------------  HPI: Pt seen and examined at bedside. Pt states she feels weak. Her SOB has improved today. She feels hungry.   Denies current SOB, chest pain and LE edema      PAST HISTORY  --------------------------------------------------------------------------------  No significant changes to PMH, PSH, FHx, SHx, unless otherwise noted    ALLERGIES & MEDICATIONS  --------------------------------------------------------------------------------  Allergies    azithromycin (Hives; Pruritus)    Intolerances      Standing Inpatient Medications  acetaminophen  IVPB .. 1000 milliGRAM(s) IV Intermittent once  aspirin enteric coated 81 milliGRAM(s) Oral daily  atorvastatin 40 milliGRAM(s) Oral at bedtime  chlorhexidine 2% Cloths 1 Application(s) Topical daily  clopidogrel Tablet 75 milliGRAM(s) Oral daily  dextrose 5%. 1000 milliLiter(s) IV Continuous <Continuous>  dextrose 50% Injectable 12.5 Gram(s) IV Push once  dextrose 50% Injectable 25 Gram(s) IV Push once  dextrose 50% Injectable 25 Gram(s) IV Push once  digoxin     Tablet 0.125 milliGRAM(s) Oral daily  docusate sodium 100 milliGRAM(s) Oral two times a day  hydrALAZINE 50 milliGRAM(s) Oral every 8 hours  insulin glargine Injectable (LANTUS) 60 Unit(s) SubCutaneous at bedtime  insulin lispro (HumaLOG) corrective regimen sliding scale   SubCutaneous three times a day before meals  insulin lispro (HumaLOG) corrective regimen sliding scale   SubCutaneous at bedtime  insulin lispro Injectable (HumaLOG) 30 Unit(s) SubCutaneous three times a day before meals  isosorbide   dinitrate Tablet (ISORDIL) 30 milliGRAM(s) Oral three times a day  levothyroxine 50 MICROGram(s) Oral daily  lidocaine   Patch 1 Patch Transdermal daily  melatonin 3 milliGRAM(s) Oral at bedtime  metoprolol tartrate 12.5 milliGRAM(s) Oral two times a day  montelukast 10 milliGRAM(s) Oral daily  pantoprazole    Tablet 40 milliGRAM(s) Oral before breakfast  polyethylene glycol 3350 17 Gram(s) Oral daily  senna 2 Tablet(s) Oral at bedtime  spironolactone 25 milliGRAM(s) Oral daily  torsemide 100 milliGRAM(s) Oral daily    PRN Inpatient Medications  acetaminophen   Tablet .. 650 milliGRAM(s) Oral every 6 hours PRN  dextrose 40% Gel 15 Gram(s) Oral once PRN  glucagon  Injectable 1 milliGRAM(s) IntraMuscular once PRN      REVIEW OF SYSTEMS  --------------------------------------------------------------------------------  General: no fever  CVS: no chest pain  RESP: no sob, no cough  ABD: no abdominal pain  : no dysuria,  MSK: no edema     VITALS/PHYSICAL EXAM  --------------------------------------------------------------------------------  T(C): 36.6 (08-02-19 @ 04:45), Max: 36.7 (08-01-19 @ 20:03)  HR: 65 (08-02-19 @ 06:51) (64 - 82)  BP: 117/63 (08-02-19 @ 06:51) (107/57 - 125/62)  RR: 18 (08-02-19 @ 04:45) (16 - 18)  SpO2: 100% (08-02-19 @ 04:45) (94% - 100%)  Wt(kg): --    08-01-19 @ 07:01  -  08-02-19 @ 07:00  --------------------------------------------------------  IN: 700 mL / OUT: 700 mL / NET: 0 mL    08-02-19 @ 07:01  -  08-02-19 @ 11:21  --------------------------------------------------------  IN: 120 mL / OUT: 0 mL / NET: 120 mL      Physical Exam:  	Gen: NAD  	HEENT: MMM  	Pulm: Course BS B/L  	CV: S1S2  	Abd: Soft, +BS  	Ext: No LE edema B/L                      Neuro: Awake   	Skin: Warm and Dry     LABS/STUDIES  --------------------------------------------------------------------------------              9.5    6.84  >-----------<  308      [08-02-19 @ 09:00]              30.9     137  |  99  |  59  ----------------------------<  296      [08-02-19 @ 06:11]  3.7   |  23  |  1.73        Ca     9.5     [08-02-19 @ 06:11]    TPro  7.8  /  Alb  3.8  /  TBili  0.2  /  DBili  <0.1  /  AST  37  /  ALT  33  /  AlkPhos  111  [08-01-19 @ 06:46]    Creatinine Trend:  SCr 1.73 [08-02 @ 06:11]  SCr 1.65 [08-01 @ 06:46]  SCr 1.69 [07-31 @ 07:45]  SCr 1.69 [07-30 @ 08:28]  SCr 1.61 [07-29 @ 07:10]    Urinalysis - [07-09-19 @ 13:28]      Color Yellow / Appearance Clear / SG 1.012 / pH 6.0      Gluc Negative / Ketone Negative  / Bili Negative / Urobili Negative       Blood Trace / Protein Trace / Leuk Est Negative / Nitrite Negative      RBC 1 / WBC 1 / Hyaline 0 / Gran  / Sq Epi  / Non Sq Epi 0 / Bacteria Moderate      Iron 42, TIBC 348, %sat 12      [07-05-19 @ 22:32]  Ferritin 130      [07-05-19 @ 22:32]  .4 (Ca --)      [04-25-19 @ 05:45]   --  PTH 43.55 (Ca --)      [09-10-18 @ 07:15]   --  PTH 36.60 (Ca --)      [09-08-18 @ 03:15]   --  Vitamin D (25OH) 25.9      [05-01-19 @ 04:00]  HbA1c 7.4      [07-05-19 @ 22:32]  TSH 2.00      [07-05-19 @ 22:32]  Lipid: chol 148, , HDL 35,       [06-01-19 @ 08:00]

## 2019-08-02 NOTE — PROGRESS NOTE ADULT - NSHPATTENDINGPLANDISCUSS_GEN_ALL_CORE
To reach Cardiology Attending call during weekdays Spectra [phone] or Fellow .
To reach Cardiology Attending call during weekdays Spectra [phone] or Fellow .
To reach Cardiology Attending call during weekdays Spectra [phone] or Fellow .
To reach Cardiology Attending call during weekdays Spectra [phone] or Fellow .
daughter
To reach Cardiology Attending call during weekdays Spectra [phone] or Fellow .
primary team

## 2019-08-02 NOTE — PROGRESS NOTE ADULT - SUBJECTIVE AND OBJECTIVE BOX
Endocrinology Attending Covering for Dr. Banks      Chief complaint  Patient is a 73y old  Female who presents with a chief complaint of Hypoxia, SOB (02 Aug 2019 09:35)   Review of systems  Patient in bed, looks comfortable, no fever,  had no hypoglycemia.    Labs and Fingersticks  CAPILLARY BLOOD GLUCOSE      POCT Blood Glucose.: 297 mg/dL (02 Aug 2019 07:41)  POCT Blood Glucose.: 453 mg/dL (01 Aug 2019 21:42)  POCT Blood Glucose.: 280 mg/dL (01 Aug 2019 16:19)  POCT Blood Glucose.: 144 mg/dL (01 Aug 2019 11:51)      Anion Gap, Serum: 15 (08-02 @ 06:11)  Anion Gap, Serum: 17 (08-01 @ 06:46)      Calcium, Total Serum: 9.5 (08-02 @ 06:11)  Calcium, Total Serum: 9.7 (08-01 @ 06:46)  Albumin, Serum: 3.8 (08-01 @ 06:46)    Alanine Aminotransferase (ALT/SGPT): 33 (08-01 @ 06:46)  Alkaline Phosphatase, Serum: 111 (08-01 @ 06:46)  Aspartate Aminotransferase (AST/SGOT): 37 (08-01 @ 06:46)        08-02    137  |  99  |  59<H>  ----------------------------<  296<H>  3.7   |  23  |  1.73<H>    Ca    9.5      02 Aug 2019 06:11    TPro  7.8  /  Alb  3.8  /  TBili  0.2  /  DBili  <0.1  /  AST  37  /  ALT  33  /  AlkPhos  111  08-01                        9.5    6.84  )-----------( 308      ( 02 Aug 2019 09:00 )             30.9     Medications  MEDICATIONS  (STANDING):  acetaminophen  IVPB .. 1000 milliGRAM(s) IV Intermittent once  aspirin enteric coated 81 milliGRAM(s) Oral daily  atorvastatin 40 milliGRAM(s) Oral at bedtime  chlorhexidine 2% Cloths 1 Application(s) Topical daily  clopidogrel Tablet 75 milliGRAM(s) Oral daily  dextrose 5%. 1000 milliLiter(s) (50 mL/Hr) IV Continuous <Continuous>  dextrose 50% Injectable 12.5 Gram(s) IV Push once  dextrose 50% Injectable 25 Gram(s) IV Push once  dextrose 50% Injectable 25 Gram(s) IV Push once  digoxin     Tablet 0.125 milliGRAM(s) Oral daily  docusate sodium 100 milliGRAM(s) Oral two times a day  hydrALAZINE 50 milliGRAM(s) Oral every 8 hours  insulin glargine Injectable (LANTUS) 60 Unit(s) SubCutaneous at bedtime  insulin lispro (HumaLOG) corrective regimen sliding scale   SubCutaneous three times a day before meals  insulin lispro (HumaLOG) corrective regimen sliding scale   SubCutaneous at bedtime  insulin lispro Injectable (HumaLOG) 30 Unit(s) SubCutaneous three times a day before meals  isosorbide   dinitrate Tablet (ISORDIL) 30 milliGRAM(s) Oral three times a day  levothyroxine 50 MICROGram(s) Oral daily  lidocaine   Patch 1 Patch Transdermal daily  melatonin 3 milliGRAM(s) Oral at bedtime  metoprolol tartrate 12.5 milliGRAM(s) Oral two times a day  montelukast 10 milliGRAM(s) Oral daily  pantoprazole    Tablet 40 milliGRAM(s) Oral before breakfast  polyethylene glycol 3350 17 Gram(s) Oral daily  senna 2 Tablet(s) Oral at bedtime  spironolactone 25 milliGRAM(s) Oral daily  torsemide 100 milliGRAM(s) Oral daily      Physical Exam  General: Patient comfortable in bed  Vital Signs Last 12 Hrs  T(F): 97.9 (08-02-19 @ 04:45), Max: 97.9 (08-02-19 @ 04:45)  HR: 65 (08-02-19 @ 06:51) (65 - 65)  BP: 117/63 (08-02-19 @ 06:51) (107/57 - 117/63)  BP(mean): --  RR: 18 (08-02-19 @ 04:45) (18 - 18)  SpO2: 100% (08-02-19 @ 04:45) (100% - 100%)  Neck: No palpable thyroid nodules.  CVS: S1S2, No murmurs  Respiratory: No wheezing, no crepitations  GI: Abdomen soft, bowel sounds positive  Musculoskeletal:  edema lower extremities.   Skin: No skin rashes, no ecchymosis    Diagnostics

## 2019-08-03 LAB
ANION GAP SERPL CALC-SCNC: 20 MMOL/L — HIGH (ref 5–17)
BUN SERPL-MCNC: 57 MG/DL — HIGH (ref 7–23)
CALCIUM SERPL-MCNC: 9.4 MG/DL — SIGNIFICANT CHANGE UP (ref 8.4–10.5)
CHLORIDE SERPL-SCNC: 99 MMOL/L — SIGNIFICANT CHANGE UP (ref 96–108)
CO2 SERPL-SCNC: 22 MMOL/L — SIGNIFICANT CHANGE UP (ref 22–31)
CREAT SERPL-MCNC: 1.64 MG/DL — HIGH (ref 0.5–1.3)
DIGOXIN SERPL-MCNC: 1.5 NG/ML — SIGNIFICANT CHANGE UP (ref 0.8–2)
GLUCOSE BLDC GLUCOMTR-MCNC: 148 MG/DL — HIGH (ref 70–99)
GLUCOSE BLDC GLUCOMTR-MCNC: 238 MG/DL — HIGH (ref 70–99)
GLUCOSE BLDC GLUCOMTR-MCNC: 309 MG/DL — HIGH (ref 70–99)
GLUCOSE BLDC GLUCOMTR-MCNC: 311 MG/DL — HIGH (ref 70–99)
GLUCOSE SERPL-MCNC: 219 MG/DL — HIGH (ref 70–99)
HCT VFR BLD CALC: 29.6 % — LOW (ref 34.5–45)
HGB BLD-MCNC: 9.9 G/DL — LOW (ref 11.5–15.5)
MAGNESIUM SERPL-MCNC: 2.1 MG/DL — SIGNIFICANT CHANGE UP (ref 1.6–2.6)
MCHC RBC-ENTMCNC: 29.2 PG — SIGNIFICANT CHANGE UP (ref 27–34)
MCHC RBC-ENTMCNC: 33.4 GM/DL — SIGNIFICANT CHANGE UP (ref 32–36)
MCV RBC AUTO: 87.3 FL — SIGNIFICANT CHANGE UP (ref 80–100)
PLATELET # BLD AUTO: 363 K/UL — SIGNIFICANT CHANGE UP (ref 150–400)
POTASSIUM SERPL-MCNC: 3.6 MMOL/L — SIGNIFICANT CHANGE UP (ref 3.5–5.3)
POTASSIUM SERPL-SCNC: 3.6 MMOL/L — SIGNIFICANT CHANGE UP (ref 3.5–5.3)
RBC # BLD: 3.39 M/UL — LOW (ref 3.8–5.2)
RBC # FLD: 16.4 % — HIGH (ref 10.3–14.5)
SODIUM SERPL-SCNC: 141 MMOL/L — SIGNIFICANT CHANGE UP (ref 135–145)
WBC # BLD: 8.7 K/UL — SIGNIFICANT CHANGE UP (ref 3.8–10.5)
WBC # FLD AUTO: 8.7 K/UL — SIGNIFICANT CHANGE UP (ref 3.8–10.5)

## 2019-08-03 RX ORDER — INSULIN GLARGINE 100 [IU]/ML
70 INJECTION, SOLUTION SUBCUTANEOUS AT BEDTIME
Refills: 0 | Status: DISCONTINUED | OUTPATIENT
Start: 2019-08-03 | End: 2019-08-04

## 2019-08-03 RX ORDER — METOPROLOL TARTRATE 50 MG
25 TABLET ORAL DAILY
Refills: 0 | Status: DISCONTINUED | OUTPATIENT
Start: 2019-08-03 | End: 2019-09-05

## 2019-08-03 RX ORDER — INSULIN LISPRO 100/ML
32 VIAL (ML) SUBCUTANEOUS
Refills: 0 | Status: DISCONTINUED | OUTPATIENT
Start: 2019-08-03 | End: 2019-08-05

## 2019-08-03 RX ADMIN — LIDOCAINE 1 PATCH: 4 CREAM TOPICAL at 21:28

## 2019-08-03 RX ADMIN — LIDOCAINE 1 PATCH: 4 CREAM TOPICAL at 07:00

## 2019-08-03 RX ADMIN — Medication 30 UNIT(S): at 11:51

## 2019-08-03 RX ADMIN — Medication 0.12 MILLIGRAM(S): at 06:35

## 2019-08-03 RX ADMIN — CLOPIDOGREL BISULFATE 75 MILLIGRAM(S): 75 TABLET, FILM COATED ORAL at 11:51

## 2019-08-03 RX ADMIN — PANTOPRAZOLE SODIUM 40 MILLIGRAM(S): 20 TABLET, DELAYED RELEASE ORAL at 06:35

## 2019-08-03 RX ADMIN — POLYETHYLENE GLYCOL 3350 17 GRAM(S): 17 POWDER, FOR SOLUTION ORAL at 11:50

## 2019-08-03 RX ADMIN — MONTELUKAST 10 MILLIGRAM(S): 4 TABLET, CHEWABLE ORAL at 11:51

## 2019-08-03 RX ADMIN — Medication 4: at 11:51

## 2019-08-03 RX ADMIN — Medication 100 MILLIGRAM(S): at 06:34

## 2019-08-03 RX ADMIN — ATORVASTATIN CALCIUM 40 MILLIGRAM(S): 80 TABLET, FILM COATED ORAL at 21:22

## 2019-08-03 RX ADMIN — Medication 25 MILLIGRAM(S): at 06:40

## 2019-08-03 RX ADMIN — ISOSORBIDE DINITRATE 30 MILLIGRAM(S): 5 TABLET ORAL at 14:31

## 2019-08-03 RX ADMIN — Medication 81 MILLIGRAM(S): at 11:50

## 2019-08-03 RX ADMIN — Medication 100 MILLIGRAM(S): at 17:11

## 2019-08-03 RX ADMIN — Medication 2: at 08:36

## 2019-08-03 RX ADMIN — Medication 50 MILLIGRAM(S): at 21:23

## 2019-08-03 RX ADMIN — SPIRONOLACTONE 25 MILLIGRAM(S): 25 TABLET, FILM COATED ORAL at 06:34

## 2019-08-03 RX ADMIN — INSULIN GLARGINE 70 UNIT(S): 100 INJECTION, SOLUTION SUBCUTANEOUS at 21:24

## 2019-08-03 RX ADMIN — Medication 650 MILLIGRAM(S): at 18:00

## 2019-08-03 RX ADMIN — Medication 30 UNIT(S): at 08:37

## 2019-08-03 RX ADMIN — ISOSORBIDE DINITRATE 30 MILLIGRAM(S): 5 TABLET ORAL at 21:22

## 2019-08-03 RX ADMIN — LIDOCAINE 1 PATCH: 4 CREAM TOPICAL at 10:00

## 2019-08-03 RX ADMIN — Medication 50 MICROGRAM(S): at 06:34

## 2019-08-03 RX ADMIN — Medication 50 MILLIGRAM(S): at 14:31

## 2019-08-03 RX ADMIN — CHLORHEXIDINE GLUCONATE 1 APPLICATION(S): 213 SOLUTION TOPICAL at 21:25

## 2019-08-03 RX ADMIN — SENNA PLUS 2 TABLET(S): 8.6 TABLET ORAL at 21:29

## 2019-08-03 RX ADMIN — Medication 50 MILLIGRAM(S): at 06:33

## 2019-08-03 RX ADMIN — Medication 2: at 21:23

## 2019-08-03 RX ADMIN — Medication 3 MILLIGRAM(S): at 21:23

## 2019-08-03 RX ADMIN — Medication 650 MILLIGRAM(S): at 17:12

## 2019-08-03 RX ADMIN — ISOSORBIDE DINITRATE 30 MILLIGRAM(S): 5 TABLET ORAL at 06:33

## 2019-08-03 NOTE — PROGRESS NOTE ADULT - SUBJECTIVE AND OBJECTIVE BOX
CHIEF COMPLAINT:    SUBJECTIVE:     REVIEW OF SYSTEMS:    CONSTITUTIONAL: (  )  weakness,  (  ) fevers or chills  EYES/ENT: (  )visual changes;     NECK: (  ) pain or stiffness  RESPIRATORY:   (  )cough, wheezing, hemoptysis;  (  ) shortness of breath  CARDIOVASCULAR:  (  )chest pain or palpitations  GASTROINTESTINAL:   (  )abdominal or epigastric pain.  (  ) nausea, vomiting, or hematemesis;   (   ) diarrhea or constipation.   GENITOURINARY:   (    ) dysuria, frequency or hematuria  NEUROLOGICAL:  (   ) numbness or weakness   All other review of systems is negative unless indicated above    Vital Signs Last 24 Hrs  T(C): 36.9 (03 Aug 2019 04:36), Max: 36.9 (03 Aug 2019 04:36)  T(F): 98.4 (03 Aug 2019 04:36), Max: 98.4 (03 Aug 2019 04:36)  HR: 67 (03 Aug 2019 06:29) (63 - 69)  BP: 108/56 (03 Aug 2019 06:29) (98/42 - 119/68)  BP(mean): --  RR: 18 (02 Aug 2019 20:20) (18 - 18)  SpO2: 100% (02 Aug 2019 20:20) (100% - 100%)    I&O's Summary    02 Aug 2019 07:01  -  03 Aug 2019 07:00  --------------------------------------------------------  IN: 565 mL / OUT: 1720 mL / NET: -1155 mL        CAPILLARY BLOOD GLUCOSE      POCT Blood Glucose.: 238 mg/dL (03 Aug 2019 07:45)  POCT Blood Glucose.: 177 mg/dL (02 Aug 2019 21:18)  POCT Blood Glucose.: 198 mg/dL (02 Aug 2019 16:40)  POCT Blood Glucose.: 269 mg/dL (02 Aug 2019 12:00)      PHYSICAL EXAM:    Constitutional:  (   ) NAD,   (   )awake and alert  HEENT: PERR, EOMI,    Neck: Soft and supple, No LAD, No JVD  Respiratory:  (    Breath sounds are clear bilaterally,    (   ) wheezing, rales or rhonchi  Cardiovascular:     (   )S1 and S2, regular rate and rhythm, no Murmurs, gallops or rubs  Gastrointestinal:  (   )Bowel Sounds present, soft,   (  )nontender, nondistended,    Extremities:    (  ) peripheral edema  Vascular: 2+ peripheral pulses  Neurological:    (    )A/O x 3,   (  ) focal deficits  Musculoskeletal:    (   )  normal strength b/l upper  (     ) normal  lower extremities  Skin: No rashes    MEDICATIONS:  MEDICATIONS  (STANDING):  acetaminophen  IVPB .. 1000 milliGRAM(s) IV Intermittent once  aspirin enteric coated 81 milliGRAM(s) Oral daily  atorvastatin 40 milliGRAM(s) Oral at bedtime  chlorhexidine 2% Cloths 1 Application(s) Topical daily  clopidogrel Tablet 75 milliGRAM(s) Oral daily  dextrose 5%. 1000 milliLiter(s) (50 mL/Hr) IV Continuous <Continuous>  dextrose 50% Injectable 12.5 Gram(s) IV Push once  dextrose 50% Injectable 25 Gram(s) IV Push once  dextrose 50% Injectable 25 Gram(s) IV Push once  digoxin     Tablet 0.125 milliGRAM(s) Oral daily  docusate sodium 100 milliGRAM(s) Oral two times a day  hydrALAZINE 50 milliGRAM(s) Oral every 8 hours  insulin glargine Injectable (LANTUS) 64 Unit(s) SubCutaneous at bedtime  insulin lispro (HumaLOG) corrective regimen sliding scale   SubCutaneous three times a day before meals  insulin lispro (HumaLOG) corrective regimen sliding scale   SubCutaneous at bedtime  insulin lispro Injectable (HumaLOG) 30 Unit(s) SubCutaneous before breakfast  insulin lispro Injectable (HumaLOG) 30 Unit(s) SubCutaneous before lunch  insulin lispro Injectable (HumaLOG) 36 Unit(s) SubCutaneous before dinner  isosorbide   dinitrate Tablet (ISORDIL) 30 milliGRAM(s) Oral three times a day  levothyroxine 50 MICROGram(s) Oral daily  lidocaine   Patch 1 Patch Transdermal daily  melatonin 3 milliGRAM(s) Oral at bedtime  metoprolol succinate ER 25 milliGRAM(s) Oral daily  montelukast 10 milliGRAM(s) Oral daily  pantoprazole    Tablet 40 milliGRAM(s) Oral before breakfast  polyethylene glycol 3350 17 Gram(s) Oral daily  senna 2 Tablet(s) Oral at bedtime  spironolactone 25 milliGRAM(s) Oral daily  torsemide 100 milliGRAM(s) Oral daily      LABS: All Labs Reviewed:                        9.9    8.7   )-----------( 363      ( 03 Aug 2019 07:08 )             29.6     08-03    141  |  99  |  57<H>  ----------------------------<  219<H>  3.6   |  22  |  1.64<H>    Ca    9.4      03 Aug 2019 07:08  Mg     2.1     08-03            Blood Culture:   Urine Culture      RADIOLOGY/EKG:    ASSESSMENT AND PLAN:    DVT PPX:    ADVANCED DIRECTIVE:    DISPOSITION: CHIEF COMPLAINT:  Patient condition stable today she is complaining of slight fatigue  SUBJECTIVE:     REVIEW OF SYSTEMS:    CONSTITUTIONAL: (  )  weakness,  (  ) fevers or chills  EYES/ENT: (  )visual changes;     NECK: (  ) pain or stiffness  RESPIRATORY:   (  )cough, wheezing, hemoptysis;  (  x) shortness of breath  CARDIOVASCULAR:  (  )chest pain or palpitations  GASTROINTESTINAL:   (  )abdominal or epigastric pain.  (  ) nausea, vomiting, or hematemesis;   (   ) diarrhea or constipation.   GENITOURINARY:   (    ) dysuria, frequency or hematuria  NEUROLOGICAL:  (   ) numbness or weakness   All other review of systems is negative unless indicated above    Vital Signs Last 24 Hrs  T(C): 36.9 (03 Aug 2019 04:36), Max: 36.9 (03 Aug 2019 04:36)  T(F): 98.4 (03 Aug 2019 04:36), Max: 98.4 (03 Aug 2019 04:36)  HR: 67 (03 Aug 2019 06:29) (63 - 69)  BP: 108/56 (03 Aug 2019 06:29) (98/42 - 119/68)  BP(mean): --  RR: 18 (02 Aug 2019 20:20) (18 - 18)  SpO2: 100% (02 Aug 2019 20:20) (100% - 100%)    I&O's Summary    02 Aug 2019 07:01  -  03 Aug 2019 07:00  --------------------------------------------------------  IN: 565 mL / OUT: 1720 mL / NET: -1155 mL        CAPILLARY BLOOD GLUCOSE      POCT Blood Glucose.: 238 mg/dL (03 Aug 2019 07:45)  POCT Blood Glucose.: 177 mg/dL (02 Aug 2019 21:18)  POCT Blood Glucose.: 198 mg/dL (02 Aug 2019 16:40)  POCT Blood Glucose.: 269 mg/dL (02 Aug 2019 12:00)      PHYSICAL EXAM:         Constitutional:  ( x  ) NAD,   ( x  )awake and alert  HEENT: PERR, EOMI,  He  Neck: Soft and supple, No LAD, No JVD  Respiratory:  (   x Breath sounds are clear bilaterally,    (   ) wheezing, rales or rhonchi  Cardiovascular:     ( x  )S1 and S2, regular rate and rhythm, no Murmurs, gallops or rubs  Gastrointestinal:  (  x )Bowel Sounds present, soft,   (  )nontender, nondistended,    Extremities:    (  ) peripheral edema  Vascular: 2+ peripheral pulses  Neurological:    (  x  )A/O x 3,   (  ) focal deficits  Musculoskeletal:    (x   )  normal strength b/l upper  (     ) normal  lower extremities  Skin: No rashes        MEDICATIONS:  MEDICATIONS  (STANDING):  acetaminophen  IVPB .. 1000 milliGRAM(s) IV Intermittent once  aspirin enteric coated 81 milliGRAM(s) Oral daily  atorvastatin 40 milliGRAM(s) Oral at bedtime  chlorhexidine 2% Cloths 1 Application(s) Topical daily  clopidogrel Tablet 75 milliGRAM(s) Oral daily  dextrose 5%. 1000 milliLiter(s) (50 mL/Hr) IV Continuous <Continuous>  dextrose 50% Injectable 12.5 Gram(s) IV Push once  dextrose 50% Injectable 25 Gram(s) IV Push once  dextrose 50% Injectable 25 Gram(s) IV Push once  digoxin     Tablet 0.125 milliGRAM(s) Oral daily  docusate sodium 100 milliGRAM(s) Oral two times a day  hydrALAZINE 50 milliGRAM(s) Oral every 8 hours  insulin glargine Injectable (LANTUS) 64 Unit(s) SubCutaneous at bedtime  insulin lispro (HumaLOG) corrective regimen sliding scale   SubCutaneous three times a day before meals  insulin lispro (HumaLOG) corrective regimen sliding scale   SubCutaneous at bedtime  insulin lispro Injectable (HumaLOG) 30 Unit(s) SubCutaneous before breakfast  insulin lispro Injectable (HumaLOG) 30 Unit(s) SubCutaneous before lunch  insulin lispro Injectable (HumaLOG) 36 Unit(s) SubCutaneous before dinner  isosorbide   dinitrate Tablet (ISORDIL) 30 milliGRAM(s) Oral three times a day  levothyroxine 50 MICROGram(s) Oral daily  lidocaine   Patch 1 Patch Transdermal daily  melatonin 3 milliGRAM(s) Oral at bedtime  metoprolol succinate ER 25 milliGRAM(s) Oral daily  montelukast 10 milliGRAM(s) Oral daily  pantoprazole    Tablet 40 milliGRAM(s) Oral before breakfast  polyethylene glycol 3350 17 Gram(s) Oral daily  senna 2 Tablet(s) Oral at bedtime  spironolactone 25 milliGRAM(s) Oral daily  torsemide 100 milliGRAM(s) Oral daily      LABS: All Labs Reviewed:                        9.9    8.7   )-----------( 363      ( 03 Aug 2019 07:08 )             29.6     08-03    141  |  99  |  57<H>  ----------------------------<  219<H>  3.6   |  22  |  1.64<H>    Ca    9.4      03 Aug 2019 07:08  Mg     2.1     08-03            Blood Culture:   Urine Culture      RADIOLOGY/EKG:    ASSESSMENT AND PLAN:  74 yo woman with PMHx of HTN, T2DM (A1c 7.4%), CAD s/p CABG (LIMA to LAD, SVG to OM, SVG to PDA 2014 at [facility]), non-dilated ICM (EF 20-25%), severe mitral regurgitation s/p mitral clip (6/13/19 Dr. Newman), severe pulm HTN, CKD, hypothyroidism, who is presenting to ED after experiencing worsening SOB and intermittent chest pain for 1 week at [facility]. Of note, pt was recently discharged on 6/19 after having mitral clip procedure completed. She initially required BiPAP with improvement in her respiratory status following diuresis. Initiated on vasopressors and inotropes in CCU, which were subsequently weaned off. Infectious work up was negative.    #HFrEF likely 2/2 ICM with MR s/p alonso clip  - Pt tolerating torsemide 100 mg daily for now. Would aim for this on d/c as well.    - Check daily weights, Is and Os, and daily lactates.   - C/w hydralazine 50 mg TID and isordil 30 TID  - c/w spironolactone 25 mg.   - No further cardiac testing at this time.    -  Continue beta-blocker Toprol 25 mg  #CAD  - C/w ASA and statin   - Pt with chest pain, but also with end stage cardiomyopathy, coronary disease, had MitraClip and no further intervention feasible. She should not have trops checked. No chest pain this morning.     #SVT likely Atrial tach v Atrial flutter   - Amio initially not given due to elevated LFTs likely in the setting of congestion.   - recheck LFTs.   - Monitor on tele.   - C/w Dig and And Toprol 25 mg  - If pt has SVT again can consider amio.  DVT PPX:    ADVANCED DIRECTIVE:    DISPOSITION: Discharge home if remains stable patient  gained 5 pounds  But lost 1 pound yesterday      DVT PPX:    ADVANCED DIRECTIVE:    DISPOSITION:

## 2019-08-03 NOTE — PROGRESS NOTE ADULT - ASSESSMENT
Pt is a 74 yo woman with PMHx of HTN, T2DM, CAD s/p CABG (LIMA to LAD, SVG to OM, SVG to PDA 2014 at [facility]), non-dilated ICM (EF 20-25%), severe mitral regurgitation s/p mitral clip (6/13/19 Dr. Newman), severe pulm HTN, CKD, hypothyroidism admitted with worsening SOB.    A/P:  MERVIN  Likely sec to cardiogenic shock  Renal function has improved and now remains stable at 1.64 today  Pt currently on Oral Torsemide and Spironolactone   Continue diuretic therapy per cardiology.  Optimize glucose control  Monitor renal function   Avoid further nephrotoxics, NSAIDS, RCA    CKD stage 4:  baseline Scr 1.8-2.0. Scr stable at 1.64 today.   Renal function fluctuates sec to CHF  Monitor renal function at present    SOB:  in setting of HF. Improving on diuretic therapy.   Continue diuretics per cardiology    Acidosis   Sec to Renal failure  Improved and is currently at goal   Monitor serum Co2 at present    Hypokalemia:  in the setting of diuretic therapy.   Improved with supplementation     Anemia:   Hb stable  Pt with iron deficiency anemia.

## 2019-08-03 NOTE — PROGRESS NOTE ADULT - SUBJECTIVE AND OBJECTIVE BOX
[facility] [place] [facility]   MD GONZALEZ SHAH D.O, PA    TELEPHONE NUMBERS:  OFFICE: [phone]  DR. SANTOYO CELL: [phone]  JUSTEN FIELD CELL: [phone]  DR. RIDER CELL:  [phone]    RENAL FOLLOW UP NOTE  --------------------------------------------------------------------------------  HPI: Pt seen and examined at bedside. Pt states her SOB has improved. No SOB overnight. Tolerating oral intake. Admits to weakness.   Denies SOB, chest pain or LE edema    PAST HISTORY  --------------------------------------------------------------------------------  No significant changes to PMH, PSH, FHx, SHx, unless otherwise noted    ALLERGIES & MEDICATIONS  --------------------------------------------------------------------------------  Allergies    azithromycin (Hives; Pruritus)    Intolerances      Standing Inpatient Medications  acetaminophen  IVPB .. 1000 milliGRAM(s) IV Intermittent once  aspirin enteric coated 81 milliGRAM(s) Oral daily  atorvastatin 40 milliGRAM(s) Oral at bedtime  chlorhexidine 2% Cloths 1 Application(s) Topical daily  clopidogrel Tablet 75 milliGRAM(s) Oral daily  dextrose 5%. 1000 milliLiter(s) IV Continuous <Continuous>  dextrose 50% Injectable 12.5 Gram(s) IV Push once  dextrose 50% Injectable 25 Gram(s) IV Push once  dextrose 50% Injectable 25 Gram(s) IV Push once  digoxin     Tablet 0.125 milliGRAM(s) Oral daily  docusate sodium 100 milliGRAM(s) Oral two times a day  hydrALAZINE 50 milliGRAM(s) Oral every 8 hours  insulin glargine Injectable (LANTUS) 64 Unit(s) SubCutaneous at bedtime  insulin lispro (HumaLOG) corrective regimen sliding scale   SubCutaneous three times a day before meals  insulin lispro (HumaLOG) corrective regimen sliding scale   SubCutaneous at bedtime  insulin lispro Injectable (HumaLOG) 30 Unit(s) SubCutaneous before breakfast  insulin lispro Injectable (HumaLOG) 30 Unit(s) SubCutaneous before lunch  insulin lispro Injectable (HumaLOG) 36 Unit(s) SubCutaneous before dinner  isosorbide   dinitrate Tablet (ISORDIL) 30 milliGRAM(s) Oral three times a day  levothyroxine 50 MICROGram(s) Oral daily  lidocaine   Patch 1 Patch Transdermal daily  melatonin 3 milliGRAM(s) Oral at bedtime  metoprolol succinate ER 25 milliGRAM(s) Oral daily  montelukast 10 milliGRAM(s) Oral daily  pantoprazole    Tablet 40 milliGRAM(s) Oral before breakfast  polyethylene glycol 3350 17 Gram(s) Oral daily  senna 2 Tablet(s) Oral at bedtime  spironolactone 25 milliGRAM(s) Oral daily  torsemide 100 milliGRAM(s) Oral daily    PRN Inpatient Medications  acetaminophen   Tablet .. 650 milliGRAM(s) Oral every 6 hours PRN  dextrose 40% Gel 15 Gram(s) Oral once PRN  glucagon  Injectable 1 milliGRAM(s) IntraMuscular once PRN      REVIEW OF SYSTEMS  --------------------------------------------------------------------------------  General: no fever  CVS: no chest pain  RESP: no sob, no cough  ABD: no abdominal pain  : no dysuria,  MSK: no edema     VITALS/PHYSICAL EXAM  --------------------------------------------------------------------------------  T(C): 36.9 (08-03-19 @ 04:36), Max: 36.9 (08-03-19 @ 04:36)  HR: 67 (08-03-19 @ 06:29) (63 - 69)  BP: 108/56 (08-03-19 @ 06:29) (98/42 - 119/68)  RR: 18 (08-02-19 @ 20:20) (18 - 18)  SpO2: 100% (08-02-19 @ 20:20) (100% - 100%)  Wt(kg): --    08-02-19 @ 07:01  -  08-03-19 @ 07:00  --------------------------------------------------------  IN: 565 mL / OUT: 1720 mL / NET: -1155 mL      Physical Exam:  	Gen: NAD  	HEENT: MMM  	Pulm: CTA B/L  	CV: S1S2  	Abd: Soft, +BS  	Ext: No LE edema B/L                      Neuro: Awake   	Skin: Warm and Dry     LABS/STUDIES  --------------------------------------------------------------------------------              9.9    8.7   >-----------<  363      [08-03-19 @ 07:08]              29.6     141  |  99  |  57  ----------------------------<  219      [08-03-19 @ 07:08]  3.6   |  22  |  1.64        Ca     9.4     [08-03-19 @ 07:08]      Mg     2.1     [08-03-19 @ 07:08]    Creatinine Trend:  SCr 1.64 [08-03 @ 07:08]  SCr 1.73 [08-02 @ 06:11]  SCr 1.65 [08-01 @ 06:46]  SCr 1.69 [07-31 @ 07:45]  SCr 1.69 [07-30 @ 08:28]    Urinalysis - [07-09-19 @ 13:28]      Color Yellow / Appearance Clear / SG 1.012 / pH 6.0      Gluc Negative / Ketone Negative  / Bili Negative / Urobili Negative       Blood Trace / Protein Trace / Leuk Est Negative / Nitrite Negative      RBC 1 / WBC 1 / Hyaline 0 / Gran  / Sq Epi  / Non Sq Epi 0 / Bacteria Moderate      Iron 42, TIBC 348, %sat 12      [07-05-19 @ 22:32]  Ferritin 130      [07-05-19 @ 22:32]  .4 (Ca --)      [04-25-19 @ 05:45]   --  PTH 43.55 (Ca --)      [09-10-18 @ 07:15]   --  PTH 36.60 (Ca --)      [09-08-18 @ 03:15]   --  Vitamin D (25OH) 25.9      [05-01-19 @ 04:00]  HbA1c 7.4      [07-05-19 @ 22:32]  TSH 2.00      [07-05-19 @ 22:32]  Lipid: chol 148, , HDL 35,       [06-01-19 @ 08:00]

## 2019-08-03 NOTE — PROGRESS NOTE ADULT - ASSESSMENT
Assessment  DMT2: 73y Female with DM T2 with hyperglycemia on insulin, blood sugars still  running High  [phone] yesterday, this   CAD: S/P cabg On medications, stable, monitored.  Hypothyroidism:  On synthroid 50  mcg po daily, asymptomatic.  HTN: Controlled, On med.  HLD; on statin  CKD: Monitor labs/BMP,         Plan:   DMT2:   Increase  Lantus to 70 units qhs, Continue  Humalog to 32  units before breakfast and Lunch and 36 units pre-dinner   in addition to correction scale coverage, monitor FS will FU  Patient counseled for compliance with consistent low carb diet.  CAD: S/P cabg On medications, stable, monitored.  Hypothyroidism:  On synthroid 50  mcg po daily, asymptomatic. F/U tsh as outpatient  HTN: Continue treatment, monitoring, Primary team FU  HLD; on statin  CKD: Continue monitoring labs, renal FU  Thank you for consult will F/U  erika Woodard MD  [phone]

## 2019-08-04 LAB
ANION GAP SERPL CALC-SCNC: 17 MMOL/L — SIGNIFICANT CHANGE UP (ref 5–17)
BUN SERPL-MCNC: 55 MG/DL — HIGH (ref 7–23)
CALCIUM SERPL-MCNC: 9.4 MG/DL — SIGNIFICANT CHANGE UP (ref 8.4–10.5)
CHLORIDE SERPL-SCNC: 98 MMOL/L — SIGNIFICANT CHANGE UP (ref 96–108)
CO2 SERPL-SCNC: 23 MMOL/L — SIGNIFICANT CHANGE UP (ref 22–31)
CREAT SERPL-MCNC: 1.75 MG/DL — HIGH (ref 0.5–1.3)
GLUCOSE BLDC GLUCOMTR-MCNC: 210 MG/DL — HIGH (ref 70–99)
GLUCOSE BLDC GLUCOMTR-MCNC: 211 MG/DL — HIGH (ref 70–99)
GLUCOSE BLDC GLUCOMTR-MCNC: 211 MG/DL — HIGH (ref 70–99)
GLUCOSE BLDC GLUCOMTR-MCNC: 243 MG/DL — HIGH (ref 70–99)
GLUCOSE SERPL-MCNC: 230 MG/DL — HIGH (ref 70–99)
POTASSIUM SERPL-MCNC: 3.7 MMOL/L — SIGNIFICANT CHANGE UP (ref 3.5–5.3)
POTASSIUM SERPL-SCNC: 3.7 MMOL/L — SIGNIFICANT CHANGE UP (ref 3.5–5.3)
SODIUM SERPL-SCNC: 138 MMOL/L — SIGNIFICANT CHANGE UP (ref 135–145)

## 2019-08-04 PROCEDURE — 99233 SBSQ HOSP IP/OBS HIGH 50: CPT | Mod: GC

## 2019-08-04 PROCEDURE — 93010 ELECTROCARDIOGRAM REPORT: CPT

## 2019-08-04 RX ORDER — HEPARIN SODIUM 5000 [USP'U]/ML
5000 INJECTION INTRAVENOUS; SUBCUTANEOUS EVERY 12 HOURS
Refills: 0 | Status: DISCONTINUED | OUTPATIENT
Start: 2019-08-04 | End: 2019-09-05

## 2019-08-04 RX ORDER — FUROSEMIDE 40 MG
60 TABLET ORAL EVERY 12 HOURS
Refills: 0 | Status: DISCONTINUED | OUTPATIENT
Start: 2019-08-04 | End: 2019-08-13

## 2019-08-04 RX ORDER — MORPHINE SULFATE 50 MG/1
0.5 CAPSULE, EXTENDED RELEASE ORAL ONCE
Refills: 0 | Status: DISCONTINUED | OUTPATIENT
Start: 2019-08-04 | End: 2019-08-04

## 2019-08-04 RX ORDER — INSULIN GLARGINE 100 [IU]/ML
74 INJECTION, SOLUTION SUBCUTANEOUS AT BEDTIME
Refills: 0 | Status: DISCONTINUED | OUTPATIENT
Start: 2019-08-04 | End: 2019-08-05

## 2019-08-04 RX ADMIN — MONTELUKAST 10 MILLIGRAM(S): 4 TABLET, CHEWABLE ORAL at 12:46

## 2019-08-04 RX ADMIN — Medication 36 UNIT(S): at 17:15

## 2019-08-04 RX ADMIN — Medication 100 MILLIGRAM(S): at 17:16

## 2019-08-04 RX ADMIN — Medication 2: at 17:14

## 2019-08-04 RX ADMIN — Medication 50 MILLIGRAM(S): at 20:43

## 2019-08-04 RX ADMIN — CLOPIDOGREL BISULFATE 75 MILLIGRAM(S): 75 TABLET, FILM COATED ORAL at 12:46

## 2019-08-04 RX ADMIN — LIDOCAINE 1 PATCH: 4 CREAM TOPICAL at 22:05

## 2019-08-04 RX ADMIN — Medication 0.12 MILLIGRAM(S): at 06:25

## 2019-08-04 RX ADMIN — Medication 50 MICROGRAM(S): at 06:25

## 2019-08-04 RX ADMIN — PANTOPRAZOLE SODIUM 40 MILLIGRAM(S): 20 TABLET, DELAYED RELEASE ORAL at 06:24

## 2019-08-04 RX ADMIN — SPIRONOLACTONE 25 MILLIGRAM(S): 25 TABLET, FILM COATED ORAL at 06:53

## 2019-08-04 RX ADMIN — Medication 100 MILLIGRAM(S): at 06:25

## 2019-08-04 RX ADMIN — INSULIN GLARGINE 74 UNIT(S): 100 INJECTION, SOLUTION SUBCUTANEOUS at 22:04

## 2019-08-04 RX ADMIN — MORPHINE SULFATE 0.5 MILLIGRAM(S): 50 CAPSULE, EXTENDED RELEASE ORAL at 20:15

## 2019-08-04 RX ADMIN — MORPHINE SULFATE 0.5 MILLIGRAM(S): 50 CAPSULE, EXTENDED RELEASE ORAL at 19:39

## 2019-08-04 RX ADMIN — Medication 2: at 12:43

## 2019-08-04 RX ADMIN — Medication 32 UNIT(S): at 08:25

## 2019-08-04 RX ADMIN — Medication 100 MILLIGRAM(S): at 06:53

## 2019-08-04 RX ADMIN — Medication 25 MILLIGRAM(S): at 06:53

## 2019-08-04 RX ADMIN — HEPARIN SODIUM 5000 UNIT(S): 5000 INJECTION INTRAVENOUS; SUBCUTANEOUS at 17:16

## 2019-08-04 RX ADMIN — Medication 3 MILLIGRAM(S): at 22:06

## 2019-08-04 RX ADMIN — Medication 50 MILLIGRAM(S): at 12:46

## 2019-08-04 RX ADMIN — ATORVASTATIN CALCIUM 40 MILLIGRAM(S): 80 TABLET, FILM COATED ORAL at 20:53

## 2019-08-04 RX ADMIN — ISOSORBIDE DINITRATE 30 MILLIGRAM(S): 5 TABLET ORAL at 20:52

## 2019-08-04 RX ADMIN — Medication 60 MILLIGRAM(S): at 20:42

## 2019-08-04 RX ADMIN — SENNA PLUS 2 TABLET(S): 8.6 TABLET ORAL at 20:44

## 2019-08-04 RX ADMIN — Medication 81 MILLIGRAM(S): at 12:46

## 2019-08-04 RX ADMIN — Medication 50 MILLIGRAM(S): at 06:28

## 2019-08-04 RX ADMIN — Medication 2: at 08:24

## 2019-08-04 RX ADMIN — ISOSORBIDE DINITRATE 30 MILLIGRAM(S): 5 TABLET ORAL at 12:46

## 2019-08-04 RX ADMIN — ISOSORBIDE DINITRATE 30 MILLIGRAM(S): 5 TABLET ORAL at 06:25

## 2019-08-04 RX ADMIN — Medication 32 UNIT(S): at 12:43

## 2019-08-04 NOTE — PROGRESS NOTE ADULT - SUBJECTIVE AND OBJECTIVE BOX
[facility]   MD GONZALEZ SHAH D.O, PA    TELEPHONE NUMBERS:  OFFICE: [phone]  DR. SANTOYO CELL: [phone]  JUSTEN FIELD CELL: [phone]  DR. RIDER CELL:  [phone]    RENAL FOLLOW UP NOTE  --------------------------------------------------------------------------------  HPI: Pt seen and examined at bedside. Pt states her SOB has improved. Still without SOB overnight. Admits to weakness.   Denies SOB, chest pain or LE edema    PAST HISTORY  --------------------------------------------------------------------------------  No significant changes to PMH, PSH, FHx, SHx, unless otherwise noted    ALLERGIES & MEDICATIONS  --------------------------------------------------------------------------------  Allergies    azithromycin (Hives; Pruritus)    Intolerances      Standing Inpatient Medications  acetaminophen  IVPB .. 1000 milliGRAM(s) IV Intermittent once  aspirin enteric coated 81 milliGRAM(s) Oral daily  atorvastatin 40 milliGRAM(s) Oral at bedtime  chlorhexidine 2% Cloths 1 Application(s) Topical daily  clopidogrel Tablet 75 milliGRAM(s) Oral daily  dextrose 5%. 1000 milliLiter(s) IV Continuous <Continuous>  dextrose 50% Injectable 12.5 Gram(s) IV Push once  dextrose 50% Injectable 25 Gram(s) IV Push once  dextrose 50% Injectable 25 Gram(s) IV Push once  digoxin     Tablet 0.125 milliGRAM(s) Oral daily  docusate sodium 100 milliGRAM(s) Oral two times a day  heparin  Injectable 5000 Unit(s) SubCutaneous every 12 hours  hydrALAZINE 50 milliGRAM(s) Oral every 8 hours  insulin glargine Injectable (LANTUS) 70 Unit(s) SubCutaneous at bedtime  insulin lispro (HumaLOG) corrective regimen sliding scale   SubCutaneous three times a day before meals  insulin lispro (HumaLOG) corrective regimen sliding scale   SubCutaneous at bedtime  insulin lispro Injectable (HumaLOG) 36 Unit(s) SubCutaneous before dinner  insulin lispro Injectable (HumaLOG) 32 Unit(s) SubCutaneous before breakfast  insulin lispro Injectable (HumaLOG) 32 Unit(s) SubCutaneous before lunch  isosorbide   dinitrate Tablet (ISORDIL) 30 milliGRAM(s) Oral three times a day  levothyroxine 50 MICROGram(s) Oral daily  lidocaine   Patch 1 Patch Transdermal daily  melatonin 3 milliGRAM(s) Oral at bedtime  metoprolol succinate ER 25 milliGRAM(s) Oral daily  montelukast 10 milliGRAM(s) Oral daily  pantoprazole    Tablet 40 milliGRAM(s) Oral before breakfast  polyethylene glycol 3350 17 Gram(s) Oral daily  senna 2 Tablet(s) Oral at bedtime  spironolactone 25 milliGRAM(s) Oral daily  torsemide 100 milliGRAM(s) Oral daily    PRN Inpatient Medications  acetaminophen   Tablet .. 650 milliGRAM(s) Oral every 6 hours PRN  dextrose 40% Gel 15 Gram(s) Oral once PRN  glucagon  Injectable 1 milliGRAM(s) IntraMuscular once PRN      REVIEW OF SYSTEMS  --------------------------------------------------------------------------------  General: no fever  CVS: no chest pain  RESP: no sob, no cough  ABD: no abdominal pain  : no dysuria,  MSK: no edema     VITALS/PHYSICAL EXAM  --------------------------------------------------------------------------------  T(C): 36.7 (08-04-19 @ 12:00), Max: 36.7 (08-04-19 @ 12:00)  HR: 73 (08-04-19 @ 12:00) (64 - 73)  BP: 127/57 (08-04-19 @ 12:00) (110/56 - 131/59)  RR: 18 (08-04-19 @ 12:00) (18 - 18)  SpO2: 100% (08-04-19 @ 12:00) (100% - 100%)  Wt(kg): --    08-03-19 @ 07:01  -  08-04-19 @ 07:00  --------------------------------------------------------  IN: 640 mL / OUT: 550 mL / NET: 90 mL    08-04-19 @ 07:01  -  08-04-19 @ 13:00  --------------------------------------------------------  IN: 150 mL / OUT: 325 mL / NET: -175 mL    Physical Exam:  	Gen: NAD  	HEENT: MMM  	Pulm: CTA B/L  	CV: S1S2  	Abd: Soft, +BS  	Ext: No LE edema B/L                      Neuro: Awake   	Skin: Warm and Dry     LABS/STUDIES  --------------------------------------------------------------------------------              9.9    8.7   >-----------<  363      [08-03-19 @ 07:08]              29.6     138  |  98  |  55  ----------------------------<  230      [08-04-19 @ 07:02]  3.7   |  23  |  1.75        Ca     9.4     [08-04-19 @ 07:02]      Mg     2.1     [08-03-19 @ 07:08]    Creatinine Trend:  SCr 1.75 [08-04 @ 07:02]  SCr 1.64 [08-03 @ 07:08]  SCr 1.73 [08-02 @ 06:11]  SCr 1.65 [08-01 @ 06:46]  SCr 1.69 [07-31 @ 07:45]    Urinalysis - [07-09-19 @ 13:28]      Color Yellow / Appearance Clear / SG 1.012 / pH 6.0      Gluc Negative / Ketone Negative  / Bili Negative / Urobili Negative       Blood Trace / Protein Trace / Leuk Est Negative / Nitrite Negative      RBC 1 / WBC 1 / Hyaline 0 / Gran  / Sq Epi  / Non Sq Epi 0 / Bacteria Moderate      Iron 42, TIBC 348, %sat 12      [07-05-19 @ 22:32]  Ferritin 130      [07-05-19 @ 22:32]  .4 (Ca --)      [04-25-19 @ 05:45]   --  PTH 43.55 (Ca --)      [09-10-18 @ 07:15]   --  PTH 36.60 (Ca --)      [09-08-18 @ 03:15]   --  Vitamin D (25OH) 25.9      [05-01-19 @ 04:00]  HbA1c 7.4      [07-05-19 @ 22:32]  TSH 2.00      [07-05-19 @ 22:32]  Lipid: chol 148, , HDL 35,       [06-01-19 @ 08:00]

## 2019-08-04 NOTE — PROGRESS NOTE ADULT - ASSESSMENT
Pt is a 72 yo woman with PMHx of HTN, T2DM, CAD s/p CABG (LIMA to LAD, SVG to OM, SVG to PDA 2014 at [facility]), non-dilated ICM (EF 20-25%), severe mitral regurgitation s/p mitral clip (6/13/19 Dr. Newman), severe pulm HTN, CKD, hypothyroidism admitted with worsening SOB.    A/P:  MERVIN  Likely sec to cardiogenic shock  Renal function has improved and now remains stable at 1.75 today  Pt currently on Oral Torsemide and Spironolactone   Continue diuretic therapy per cardiology.  Optimize glucose control  Monitor renal function   Avoid further nephrotoxics, NSAIDS, RCA    CKD stage 4:  baseline Scr 1.8-2.0. Scr stable at 1.64 today.   Renal function fluctuates sec to CHF  Monitor renal function at present    SOB:  in setting of HF. Improving on diuretic therapy.   Continue diuretics per cardiology    Acidosis   Sec to Renal failure  Improved and is currently at goal   Monitor serum Co2 at present    Hypokalemia:  in the setting of diuretic therapy.   Improved with supplementation     Anemia:   Hb stable  Pt with iron deficiency anemia.

## 2019-08-04 NOTE — CHART NOTE - NSCHARTNOTEFT_GEN_A_CORE
MEDICINE NP     SELVIN CLAIRE  73y Female  Patient is a 73y old  Female who presents with a chief complaint of Hypoxia, SOB (04 Aug 2019 12:59)       Event Summary:  Notified by RN patient with c/o CP   Patient seen and examined at bedside patient is grunting in pain, when ask if she had CP she responded yes   states CP is 10/10 non radiation      Vital Signs Last 24 Hrs  T(C): 36.7 (04 Aug 2019 19:12), Max: 36.7 (04 Aug 2019 12:00)  T(F): 98.1 (04 Aug 2019 19:12), Max: 98.1 (04 Aug 2019 19:12)  HR: 79 (04 Aug 2019 19:12) (64 - 79)  BP: 137/73 (04 Aug 2019 19:12) (110/56 - 137/73)  BP(mean): --  RR: 18 (04 Aug 2019 19:12) (18 - 18)  SpO2: 100% (04 Aug 2019 19:12) (100% - 100%)    Neuro : A&Ox3, nonfocal, CUADRA x 4  Respiratory:  rale to left base   CV: RRR, S1S2, no murmur  Abdominal: Soft, Non tender, non distended, + BS  MSK: No edema, + peripheral pulses,        Assessment & Plan:  HPI:  72 yo woman with PMHx of HTN, T2DM, CAD s/p CABG (LIMA to LAD, SVG to OM, SVG to PDA 2014 at [facility]), non-dilated ICM (EF 20-25%), severe mitral regurgitation s/p mitral clip (6/13/19 Dr. Newman), severe pulm HTN, CKD, hypothyroidism, who is presenting [facility] ED [facility] Also complaining of intermittent chest pain. Of note, pt was recently discharged on 6/19 after having mitral clip procedure completed. Pt says that she has been experiencing SOB for about 1 week. However, she was never noted to be hypoxic in rehab until today. She was not able to provide much hx 2/2 SOB and being on bipap. Daughter at bedside reporting that pt was well immediately after discharge. However, she gradually developed worsening SOB. Better with rest initially. Patient is currently off Bipap and is on 3 L NC tolerating well, she was diuresed well and in on Torsemide 100 mg po daily.  per  Cardiology team Pt with end stage cardiomyopathy, coronary disease, had MitraClip and no further intervention feasible. If patient has chest pain do not do troponin.  Patient now with 10/10 CP       Plan:  Chest pain   >EKG no acute changes   > Morphine 0.5 mg IVP x 1 for pain relief pending     will continue to monitor     Janneth Bah, DNP-C  Medicine Department

## 2019-08-04 NOTE — PROGRESS NOTE ADULT - ASSESSMENT
Assessment  DMT2: 73y Female with DM T2 with hyperglycemia on insulin, blood sugars still  running High  211  this AM .  CAD: S/P cabg On medications, stable, monitored.  Hypothyroidism:  On synthroid 50  mcg po daily, asymptomatic.  HTN: Controlled, On med.  HLD; on statin  CKD: Monitor labs/BMP,         Plan:   DMT2:   Increase  Lantus to 74 units qhs, stay on   Humalog to 32  units before breakfast and Lunch and 36 units pre-dinner   in addition to correction scale coverage, monitor FS will FU  Patient counseled for compliance with consistent low carb diet.  CAD: S/P cabg On medications, stable, monitored.  Hypothyroidism:  On synthroid 50  mcg po daily, asymptomatic. F/U tsh as outpatient  HTN: Continue treatment, monitoring, Primary team FU  HLD; on statin  CKD: Continue monitoring labs, renal FU  Thank you for consult will F/U  erika Woodard MD  [phone]

## 2019-08-04 NOTE — PROGRESS NOTE ADULT - SUBJECTIVE AND OBJECTIVE BOX
Endocrinology Attending Covering for Dr. Banks      Chief complaint  Patient is a 73y old  Female who presents with a chief complaint of Hypoxia, SOB (04 Aug 2019 07:34)   Review of systems  Patient in bed, looks comfortable, no fever,  had no hypoglycemia.    Labs and Fingersticks  CAPILLARY BLOOD GLUCOSE      POCT Blood Glucose.: 211 mg/dL (04 Aug 2019 07:36)  POCT Blood Glucose.: 311 mg/dL (03 Aug 2019 21:17)  POCT Blood Glucose.: 148 mg/dL (03 Aug 2019 16:20)  POCT Blood Glucose.: 309 mg/dL (03 Aug 2019 11:45)      Anion Gap, Serum: 17 (08-04 @ 07:02)  Anion Gap, Serum: 20 <H> (08-03 @ 07:08)      Calcium, Total Serum: 9.4 (08-04 @ 07:02)  Calcium, Total Serum: 9.4 (08-03 @ 07:08)          08-04    138  |  98  |  55<H>  ----------------------------<  230<H>  3.7   |  23  |  1.75<H>    Ca    9.4      04 Aug 2019 07:02  Mg     2.1     08-03                          9.9    8.7   )-----------( 363      ( 03 Aug 2019 07:08 )             29.6     Medications  MEDICATIONS  (STANDING):  acetaminophen  IVPB .. 1000 milliGRAM(s) IV Intermittent once  aspirin enteric coated 81 milliGRAM(s) Oral daily  atorvastatin 40 milliGRAM(s) Oral at bedtime  chlorhexidine 2% Cloths 1 Application(s) Topical daily  clopidogrel Tablet 75 milliGRAM(s) Oral daily  dextrose 5%. 1000 milliLiter(s) (50 mL/Hr) IV Continuous <Continuous>  dextrose 50% Injectable 12.5 Gram(s) IV Push once  dextrose 50% Injectable 25 Gram(s) IV Push once  dextrose 50% Injectable 25 Gram(s) IV Push once  digoxin     Tablet 0.125 milliGRAM(s) Oral daily  docusate sodium 100 milliGRAM(s) Oral two times a day  hydrALAZINE 50 milliGRAM(s) Oral every 8 hours  insulin glargine Injectable (LANTUS) 70 Unit(s) SubCutaneous at bedtime  insulin lispro (HumaLOG) corrective regimen sliding scale   SubCutaneous three times a day before meals  insulin lispro (HumaLOG) corrective regimen sliding scale   SubCutaneous at bedtime  insulin lispro Injectable (HumaLOG) 36 Unit(s) SubCutaneous before dinner  insulin lispro Injectable (HumaLOG) 32 Unit(s) SubCutaneous before breakfast  insulin lispro Injectable (HumaLOG) 32 Unit(s) SubCutaneous before lunch  isosorbide   dinitrate Tablet (ISORDIL) 30 milliGRAM(s) Oral three times a day  levothyroxine 50 MICROGram(s) Oral daily  lidocaine   Patch 1 Patch Transdermal daily  melatonin 3 milliGRAM(s) Oral at bedtime  metoprolol succinate ER 25 milliGRAM(s) Oral daily  montelukast 10 milliGRAM(s) Oral daily  pantoprazole    Tablet 40 milliGRAM(s) Oral before breakfast  polyethylene glycol 3350 17 Gram(s) Oral daily  senna 2 Tablet(s) Oral at bedtime  spironolactone 25 milliGRAM(s) Oral daily  torsemide 100 milliGRAM(s) Oral daily      Physical Exam  General: Patient comfortable in bed  Vital Signs Last 12 Hrs  T(F): 97.4 (08-04-19 @ 04:22), Max: 97.4 (08-04-19 @ 04:22)  HR: 64 (08-04-19 @ 07:00) (64 - 64)  BP: 118/58 (08-04-19 @ 07:00) (110/56 - 118/58)  BP(mean): --  RR: 18 (08-04-19 @ 04:22) (18 - 18)  SpO2: 100% (08-04-19 @ 04:22) (100% - 100%)  Neck: No palpable thyroid nodules.  CVS: S1S2, No murmurs  Respiratory: No wheezing, no crepitations  GI: Abdomen soft, bowel sounds positive  Musculoskeletal:  edema lower extremities.   Skin: No skin rashes, no ecchymosis    Diagnostics

## 2019-08-04 NOTE — PROGRESS NOTE ADULT - SUBJECTIVE AND OBJECTIVE BOX
CHIEF COMPLAINT:  patient was seen her daughter at the bedside she is feeling okay today no S OB recent weight is 115 she lost 1 pound which is expected and desirable  SUBJECTIVE:     REVIEW OF SYSTEMS:    CONSTITUTIONAL: ( x )  weakness,  (  ) fevers or chills  EYES/ENT: (  )visual changes;     NECK: (  ) pain or stiffness  RESPIRATORY:   (  )cough, wheezing, hemoptysis;  (  ) shortness of breath  CARDIOVASCULAR:  (  )chest pain or palpitations  GASTROINTESTINAL:   (  )abdominal or epigastric pain.  (  ) nausea, vomiting, or hematemesis;   (   ) diarrhea or constipation.   GENITOURINARY:   (    ) dysuria, frequency or hematuria  NEUROLOGICAL:  (   ) numbness or weakness   All other review of systems is negative unless indicated above    Vital Signs Last 24 Hrs  T(C): 36.4 (04 Aug 2019 20:32), Max: 36.7 (04 Aug 2019 12:00)  T(F): 97.6 (04 Aug 2019 20:32), Max: 98.1 (04 Aug 2019 19:12)  HR: 66 (04 Aug 2019 21:43) (64 - 82)  BP: 110/55 (04 Aug 2019 21:43) (109/61 - 137/73)  BP(mean): --  RR: 18 (04 Aug 2019 20:32) (18 - 18)  SpO2: 100% (04 Aug 2019 21:43) (100% - 100%)    I&O's Summary    03 Aug 2019 07:01  -  04 Aug 2019 07:00  --------------------------------------------------------  IN: 640 mL / OUT: 550 mL / NET: 90 mL    04 Aug 2019 07:01  -  04 Aug 2019 22:47  --------------------------------------------------------  IN: 390 mL / OUT: 500 mL / NET: -110 mL        CAPILLARY BLOOD GLUCOSE      POCT Blood Glucose.: 210 mg/dL (04 Aug 2019 21:41)  POCT Blood Glucose.: 243 mg/dL (04 Aug 2019 16:30)  POCT Blood Glucose.: 211 mg/dL (04 Aug 2019 11:44)  POCT Blood Glucose.: 211 mg/dL (04 Aug 2019 07:36)      PHYSICAL EXAM:         Constitutional:  ( x  ) NAD,   ( x  )awake and alert  HEENT: PERR, EOMI,  He  Neck: Soft and supple, No LAD, No JVD  Respiratory:  (   x Breath sounds are clear bilaterally,    (   ) wheezing, rales or rhonchi  Cardiovascular:     ( x  )S1 and S2, regular rate and rhythm, no Murmurs, gallops or rubs  Gastrointestinal:  (  x )Bowel Sounds present, soft,   (  )nontender, nondistended,    Extremities:    (  ) peripheral edema  Vascular: 2+ peripheral pulses  Neurological:    (  x  )A/O x 3,   (  ) focal deficits  Musculoskeletal:    (x   )  normal strength b/l upper  (     ) normal  lower extremities  Skin: No rashes        MEDICATIONS:  MEDICATIONS  (STANDING):  acetaminophen  IVPB .. 1000 milliGRAM(s) IV Intermittent once  aspirin enteric coated 81 milliGRAM(s) Oral daily  atorvastatin 40 milliGRAM(s) Oral at bedtime  chlorhexidine 2% Cloths 1 Application(s) Topical daily  clopidogrel Tablet 75 milliGRAM(s) Oral daily  dextrose 5%. 1000 milliLiter(s) (50 mL/Hr) IV Continuous <Continuous>  dextrose 50% Injectable 12.5 Gram(s) IV Push once  dextrose 50% Injectable 25 Gram(s) IV Push once  dextrose 50% Injectable 25 Gram(s) IV Push once  docusate sodium 100 milliGRAM(s) Oral two times a day  furosemide   Injectable 60 milliGRAM(s) IV Push every 12 hours  heparin  Injectable 5000 Unit(s) SubCutaneous every 12 hours  hydrALAZINE 50 milliGRAM(s) Oral every 8 hours  insulin glargine Injectable (LANTUS) 74 Unit(s) SubCutaneous at bedtime  insulin lispro (HumaLOG) corrective regimen sliding scale   SubCutaneous three times a day before meals  insulin lispro (HumaLOG) corrective regimen sliding scale   SubCutaneous at bedtime  insulin lispro Injectable (HumaLOG) 36 Unit(s) SubCutaneous before dinner  insulin lispro Injectable (HumaLOG) 32 Unit(s) SubCutaneous before breakfast  insulin lispro Injectable (HumaLOG) 32 Unit(s) SubCutaneous before lunch  isosorbide   dinitrate Tablet (ISORDIL) 30 milliGRAM(s) Oral three times a day  levothyroxine 50 MICROGram(s) Oral daily  lidocaine   Patch 1 Patch Transdermal daily  melatonin 3 milliGRAM(s) Oral at bedtime  metoprolol succinate ER 25 milliGRAM(s) Oral daily  montelukast 10 milliGRAM(s) Oral daily  pantoprazole    Tablet 40 milliGRAM(s) Oral before breakfast  polyethylene glycol 3350 17 Gram(s) Oral daily  senna 2 Tablet(s) Oral at bedtime  spironolactone 25 milliGRAM(s) Oral daily      LABS: All Labs Reviewed:                        9.9    8.7   )-----------( 363      ( 03 Aug 2019 07:08 )             29.6     08-04    138  |  98  |  55<H>  ----------------------------<  230<H>  3.7   |  23  |  1.75<H>    Ca    9.4      04 Aug 2019 07:02  Mg     2.1     08-03            Blood Culture:   Urine Culture      RADIOLOGY/EKG:    ASSESSMENT AND PLAN:  72 yo woman with PMHx of HTN, T2DM (A1c 7.4%), CAD s/p CABG (LIMA to LAD, SVG to OM, SVG to PDA 2014 at [facility]), non-dilated ICM (EF 20-25%), severe mitral regurgitation s/p mitral clip (6/13/19 Dr. Newman), severe pulm HTN, CKD, hypothyroidism, who is presenting to ED after [facility] Of note, pt was recently discharged on 6/19 after having mitral clip procedure completed. She initially required BiPAP with improvement in her respiratory status following diuresis. Initiated on vasopressors and inotropes in CCU, which were subsequently weaned off. Infectious work up was negative.    #HFrEF likely 2/2 ICM with MR s/p alonso clip  -on iv lasix again   - Check daily weights, Is and Os, and daily lactates.   - C/w hydralazine 50 mg TID and isordil 30 TID  - c/w spironolactone 25 mg.    - No further cardiac testing at this time.    -  Continue   Toprol 25 mg  #CAD  - C/w ASA and statin   - Pt with chest pain, but also with end stage cardiomyopathy, coronary disease, had MitraClip and no further intervention feasible. She should not have trops checked. No chest pain this morning.     #SVT likely Atrial tach v Atrial flutter   - Amio initially not given due to elevated LFTs likely in the setting of congestion.   - recheck LFTs.   - Monitor on tele.   - C/w Dig and And Toprol 25 mg  - If pt has SVT again can consider amio.  DVT PPX:    ADVANCED DIRECTIVE:    DISPOSITION: Discharge home if remains stable         DVT PPX:    ADVANCED DIRECTIVE:    DISPOSITION:

## 2019-08-04 NOTE — PROGRESS NOTE ADULT - SUBJECTIVE AND OBJECTIVE BOX
24H hour events:     MEDICATIONS:  aspirin enteric coated 81 milliGRAM(s) Oral daily  clopidogrel Tablet 75 milliGRAM(s) Oral daily  digoxin     Tablet 0.125 milliGRAM(s) Oral daily  hydrALAZINE 50 milliGRAM(s) Oral every 8 hours  isosorbide   dinitrate Tablet (ISORDIL) 30 milliGRAM(s) Oral three times a day  metoprolol succinate ER 25 milliGRAM(s) Oral daily  spironolactone 25 milliGRAM(s) Oral daily  torsemide 100 milliGRAM(s) Oral daily      montelukast 10 milliGRAM(s) Oral daily    acetaminophen   Tablet .. 650 milliGRAM(s) Oral every 6 hours PRN  acetaminophen  IVPB .. 1000 milliGRAM(s) IV Intermittent once  melatonin 3 milliGRAM(s) Oral at bedtime    docusate sodium 100 milliGRAM(s) Oral two times a day  pantoprazole    Tablet 40 milliGRAM(s) Oral before breakfast  polyethylene glycol 3350 17 Gram(s) Oral daily  senna 2 Tablet(s) Oral at bedtime    atorvastatin 40 milliGRAM(s) Oral at bedtime  dextrose 40% Gel 15 Gram(s) Oral once PRN  dextrose 50% Injectable 12.5 Gram(s) IV Push once  dextrose 50% Injectable 25 Gram(s) IV Push once  dextrose 50% Injectable 25 Gram(s) IV Push once  glucagon  Injectable 1 milliGRAM(s) IntraMuscular once PRN  insulin glargine Injectable (LANTUS) 70 Unit(s) SubCutaneous at bedtime  insulin lispro (HumaLOG) corrective regimen sliding scale   SubCutaneous three times a day before meals  insulin lispro (HumaLOG) corrective regimen sliding scale   SubCutaneous at bedtime  insulin lispro Injectable (HumaLOG) 36 Unit(s) SubCutaneous before dinner  insulin lispro Injectable (HumaLOG) 32 Unit(s) SubCutaneous before breakfast  insulin lispro Injectable (HumaLOG) 32 Unit(s) SubCutaneous before lunch  levothyroxine 50 MICROGram(s) Oral daily    chlorhexidine 2% Cloths 1 Application(s) Topical daily  dextrose 5%. 1000 milliLiter(s) IV Continuous <Continuous>  lidocaine   Patch 1 Patch Transdermal daily        PHYSICAL EXAM:  T(C): 36.3 (08-04-19 @ 04:22), Max: 36.7 (08-03-19 @ 12:14)  HR: 64 (08-04-19 @ 07:00) (64 - 70)  BP: 118/58 (08-04-19 @ 07:00) (110/56 - 131/59)  RR: 18 (08-04-19 @ 04:22) (18 - 18)  SpO2: 100% (08-04-19 @ 04:22) (100% - 100%)  I&O's Summary    03 Aug 2019 07:01  -  04 Aug 2019 07:00  --------------------------------------------------------  IN: 640 mL / OUT: 550 mL / NET: 90 mL        Appearance: Normal	  HEENT:   Normal oral mucosa  Cardiovascular: normal rate, regular rhythm, audible S1 and S2, no murmurs, rubs, or gallops, no JVD, no edema  Respiratory: Lungs clear to auscultation	  Psychiatry: Mood & affect appropriate  Gastrointestinal:  Soft  Skin: No rashes, No ecchymoses, No cyanosis	  Neurologic: Non-focal  Extremities: No clubbing, cyanosis or edema  Vascular: Peripheral pulses palpable         LABS:	 	    CBC Full  -  ( 03 Aug 2019 07:08 )  WBC Count : 8.7 K/uL  Hemoglobin : 9.9 g/dL  Hematocrit : 29.6 %  Platelet Count - Automated : 363 K/uL  Mean Cell Volume : 87.3 fl  Mean Cell Hemoglobin : 29.2 pg  Mean Cell Hemoglobin Concentration : 33.4 gm/dL  Auto Neutrophil # : x  Auto Lymphocyte # : x  Auto Monocyte # : x  Auto Eosinophil # : x  Auto Basophil # : x  Auto Neutrophil % : x  Auto Lymphocyte % : x  Auto Monocyte % : x  Auto Eosinophil % : x  Auto Basophil % : x    08-03    141  |  99  |  57<H>  ----------------------------<  219<H>  3.6   |  22  |  1.64<H>    Ca    9.4      03 Aug 2019 07:08  Mg     2.1     08-03        TELEMETRY: 	    	  	  ASSESSMENT/PLAN: 74 yo woman with PMHx of HTN, T2DM (A1c 7.4%), CAD s/p CABG (LIMA to LAD, SVG to OM, SVG to PDA 2014 at [facility]), non-dilated ICM (EF 20-25%), severe mitral regurgitation s/p mitral clip (6/13/19 Dr. Newman), severe pulm HTN, CKD, hypothyroidism, who is presenting to ED after experiencing worsening SOB and intermittent chest pain for 1 week at [facility]. Of note, pt was recently discharged on 6/19 after having mitral clip procedure completed. She initially required BiPAP with improvement in her respiratory status following diuresis. Initiated on vasopressors and inotropes in CCU, which were subsequently weaned off. Infectious work up was negative.    1) HFrEF likely 2/2 ICM with MR s/p alonso clip  - Pt tolerating torsemide 100 mg daily for now. Would aim for this on d/c as well.    - Check daily weights, Is and Os, and daily lactates.   - continue hydralazine 50 mg TID and isordil 30 TID  - continue spironolactone 25 mg  - continue Metoprolol Succinate 25 mg PO Daily   - No further cardiac testing at this time.      2) CAD  - continue ASA and statin   - Pt with chest pain, but also with end stage cardiomyopathy, coronary disease, had MitraClip and no further intervention feasible. She should not have trops checked. No chest pain this morning.     3) SVT - likely Atrial Tach vs. Atrial Flutter   - Amio initially not given due to elevated LFTs likely in the setting of congestion.   - recheck LFTs.   - Monitor on tele.   - C/w Dig and metoprolol at current doses.   - If pt has SVT again can consider amio.   	        Kory Gagnon  Cardiology Fellow  Please feel free to contact me at [phone] (text or call) at any time.   NOTE: All Cardiology Service and Contact information can now be found at [email or web address], password: [email or web address] 24H hour events: No acute events overnight. Pt c/o intermittent dyspnea.     MEDICATIONS:  aspirin enteric coated 81 milliGRAM(s) Oral daily  clopidogrel Tablet 75 milliGRAM(s) Oral daily  digoxin     Tablet 0.125 milliGRAM(s) Oral daily  hydrALAZINE 50 milliGRAM(s) Oral every 8 hours  isosorbide   dinitrate Tablet (ISORDIL) 30 milliGRAM(s) Oral three times a day  metoprolol succinate ER 25 milliGRAM(s) Oral daily  spironolactone 25 milliGRAM(s) Oral daily  torsemide 100 milliGRAM(s) Oral daily      montelukast 10 milliGRAM(s) Oral daily    acetaminophen   Tablet .. 650 milliGRAM(s) Oral every 6 hours PRN  acetaminophen  IVPB .. 1000 milliGRAM(s) IV Intermittent once  melatonin 3 milliGRAM(s) Oral at bedtime    docusate sodium 100 milliGRAM(s) Oral two times a day  pantoprazole    Tablet 40 milliGRAM(s) Oral before breakfast  polyethylene glycol 3350 17 Gram(s) Oral daily  senna 2 Tablet(s) Oral at bedtime    atorvastatin 40 milliGRAM(s) Oral at bedtime  dextrose 40% Gel 15 Gram(s) Oral once PRN  dextrose 50% Injectable 12.5 Gram(s) IV Push once  dextrose 50% Injectable 25 Gram(s) IV Push once  dextrose 50% Injectable 25 Gram(s) IV Push once  glucagon  Injectable 1 milliGRAM(s) IntraMuscular once PRN  insulin glargine Injectable (LANTUS) 70 Unit(s) SubCutaneous at bedtime  insulin lispro (HumaLOG) corrective regimen sliding scale   SubCutaneous three times a day before meals  insulin lispro (HumaLOG) corrective regimen sliding scale   SubCutaneous at bedtime  insulin lispro Injectable (HumaLOG) 36 Unit(s) SubCutaneous before dinner  insulin lispro Injectable (HumaLOG) 32 Unit(s) SubCutaneous before breakfast  insulin lispro Injectable (HumaLOG) 32 Unit(s) SubCutaneous before lunch  levothyroxine 50 MICROGram(s) Oral daily    chlorhexidine 2% Cloths 1 Application(s) Topical daily  dextrose 5%. 1000 milliLiter(s) IV Continuous <Continuous>  lidocaine   Patch 1 Patch Transdermal daily        PHYSICAL EXAM:  T(C): 36.3 (08-04-19 @ 04:22), Max: 36.7 (08-03-19 @ 12:14)  HR: 64 (08-04-19 @ 07:00) (64 - 70)  BP: 118/58 (08-04-19 @ 07:00) (110/56 - 131/59)  RR: 18 (08-04-19 @ 04:22) (18 - 18)  SpO2: 100% (08-04-19 @ 04:22) (100% - 100%)  I&O's Summary    03 Aug 2019 07:01  -  04 Aug 2019 07:00  --------------------------------------------------------  IN: 640 mL / OUT: 550 mL / NET: 90 mL        Appearance: Normal	  HEENT:   Normal oral mucosa  Cardiovascular: normal rate, regular rhythm, audible S1 and S2, JVP ~ 8 - 10   Respiratory: Lungs clear to auscultation	  Psychiatry: Mood & affect appropriate  Gastrointestinal:  Soft  Skin: No rashes, No ecchymoses, No cyanosis	  Neurologic: Non-focal  Extremities: No clubbing, cyanosis or edema  Vascular: Peripheral pulses palpable         LABS:	 	    CBC Full  -  ( 03 Aug 2019 07:08 )  WBC Count : 8.7 K/uL  Hemoglobin : 9.9 g/dL  Hematocrit : 29.6 %  Platelet Count - Automated : 363 K/uL  Mean Cell Volume : 87.3 fl  Mean Cell Hemoglobin : 29.2 pg  Mean Cell Hemoglobin Concentration : 33.4 gm/dL  Auto Neutrophil # : x  Auto Lymphocyte # : x  Auto Monocyte # : x  Auto Eosinophil # : x  Auto Basophil # : x  Auto Neutrophil % : x  Auto Lymphocyte % : x  Auto Monocyte % : x  Auto Eosinophil % : x  Auto Basophil % : x    08-03    141  |  99  |  57<H>  ----------------------------<  219<H>  3.6   |  22  |  1.64<H>    Ca    9.4      03 Aug 2019 07:08  Mg     2.1     08-03        TELEMETRY: Sinus Rhythm in 50s - 70s, PVCs	    	  	  ASSESSMENT/PLAN: 72 yo woman with PMHx of HTN, T2DM (A1c 7.4%), CAD s/p CABG (LIMA to LAD, SVG to OM, SVG to PDA 2014 at [facility]), non-dilated ICM (EF 20-25%), severe mitral regurgitation s/p mitral clip (6/13/19 Dr. Newman), severe pulm HTN, CKD, hypothyroidism, who is presenting to ED after experiencing worsening SOB and intermittent chest pain for 1 week at [facility]. Of note, pt was recently discharged on 6/19 after having mitral clip procedure completed. She initially required BiPAP with improvement in her respiratory status following diuresis. Initiated on vasopressors and inotropes in CCU, which were subsequently weaned off. Infectious work up was negative.    1) HFrEF likely 2/2 ICM with MR s/p alonso clip  - Pt tolerating torsemide 100 mg daily for now, though appears to have mildly elevated filling pressures on exam (will need to escalate diuretic regimen if her filling pressures appear to be elevated tomorrow)   - Check daily standing weights, Is and Os, and daily lactates.   - continue hydralazine 50 mg TID and isordil 30 TID  - continue spironolactone 25 mg  - continue Metoprolol Succinate 25 mg PO Daily   - No further cardiac testing at this time.      2) CAD  - continue ASA and statin   - Pt with chest pain, but also with end stage cardiomyopathy, coronary disease, had MitraClip and no further intervention feasible. She should not have trops checked. No chest pain this morning.     3) SVT - likely Atrial Tach vs. Atrial Flutter   - Amio initially not given due to elevated LFTs likely in the setting of congestion.   - recheck LFTs.   - Monitor on tele.   - hold Digoxin for now  - continue metoprolol at current dose as above  - If pt has SVT again can consider racho  	        Kory Gagnon  Cardiology Fellow  Please feel free to contact me at [phone] (text or call) at any time.   NOTE: All Cardiology Service and Contact information can now be found at [email or web address], password: [email or web address]

## 2019-08-05 LAB
ANION GAP SERPL CALC-SCNC: 18 MMOL/L — HIGH (ref 5–17)
BUN SERPL-MCNC: 54 MG/DL — HIGH (ref 7–23)
CALCIUM SERPL-MCNC: 9.7 MG/DL — SIGNIFICANT CHANGE UP (ref 8.4–10.5)
CHLORIDE SERPL-SCNC: 98 MMOL/L — SIGNIFICANT CHANGE UP (ref 96–108)
CO2 SERPL-SCNC: 24 MMOL/L — SIGNIFICANT CHANGE UP (ref 22–31)
CREAT SERPL-MCNC: 1.62 MG/DL — HIGH (ref 0.5–1.3)
DIGITOXIN SERPL-MCNC: SIGNIFICANT CHANGE UP NG/ML
GLUCOSE BLDC GLUCOMTR-MCNC: 191 MG/DL — HIGH (ref 70–99)
GLUCOSE BLDC GLUCOMTR-MCNC: 224 MG/DL — HIGH (ref 70–99)
GLUCOSE BLDC GLUCOMTR-MCNC: 247 MG/DL — HIGH (ref 70–99)
GLUCOSE BLDC GLUCOMTR-MCNC: 328 MG/DL — HIGH (ref 70–99)
GLUCOSE SERPL-MCNC: 137 MG/DL — HIGH (ref 70–99)
HCT VFR BLD CALC: 31.9 % — LOW (ref 34.5–45)
HGB BLD-MCNC: 10.3 G/DL — LOW (ref 11.5–15.5)
MCHC RBC-ENTMCNC: 28.4 PG — SIGNIFICANT CHANGE UP (ref 27–34)
MCHC RBC-ENTMCNC: 32.3 GM/DL — SIGNIFICANT CHANGE UP (ref 32–36)
MCV RBC AUTO: 87.9 FL — SIGNIFICANT CHANGE UP (ref 80–100)
PLATELET # BLD AUTO: 326 K/UL — SIGNIFICANT CHANGE UP (ref 150–400)
POTASSIUM SERPL-MCNC: 3.5 MMOL/L — SIGNIFICANT CHANGE UP (ref 3.5–5.3)
POTASSIUM SERPL-SCNC: 3.5 MMOL/L — SIGNIFICANT CHANGE UP (ref 3.5–5.3)
RBC # BLD: 3.63 M/UL — LOW (ref 3.8–5.2)
RBC # FLD: 16.2 % — HIGH (ref 10.3–14.5)
SODIUM SERPL-SCNC: 140 MMOL/L — SIGNIFICANT CHANGE UP (ref 135–145)
WBC # BLD: 8.2 K/UL — SIGNIFICANT CHANGE UP (ref 3.8–10.5)
WBC # FLD AUTO: 8.2 K/UL — SIGNIFICANT CHANGE UP (ref 3.8–10.5)

## 2019-08-05 PROCEDURE — 99233 SBSQ HOSP IP/OBS HIGH 50: CPT | Mod: GC

## 2019-08-05 RX ORDER — INSULIN GLARGINE 100 [IU]/ML
78 INJECTION, SOLUTION SUBCUTANEOUS AT BEDTIME
Refills: 0 | Status: DISCONTINUED | OUTPATIENT
Start: 2019-08-05 | End: 2019-08-06

## 2019-08-05 RX ORDER — INSULIN LISPRO 100/ML
36 VIAL (ML) SUBCUTANEOUS
Refills: 0 | Status: DISCONTINUED | OUTPATIENT
Start: 2019-08-05 | End: 2019-08-06

## 2019-08-05 RX ADMIN — Medication 100 MILLIGRAM(S): at 06:11

## 2019-08-05 RX ADMIN — Medication 1: at 08:29

## 2019-08-05 RX ADMIN — POLYETHYLENE GLYCOL 3350 17 GRAM(S): 17 POWDER, FOR SOLUTION ORAL at 12:38

## 2019-08-05 RX ADMIN — MONTELUKAST 10 MILLIGRAM(S): 4 TABLET, CHEWABLE ORAL at 12:38

## 2019-08-05 RX ADMIN — Medication 60 MILLIGRAM(S): at 06:17

## 2019-08-05 RX ADMIN — Medication 60 MILLIGRAM(S): at 17:07

## 2019-08-05 RX ADMIN — SPIRONOLACTONE 25 MILLIGRAM(S): 25 TABLET, FILM COATED ORAL at 06:12

## 2019-08-05 RX ADMIN — ISOSORBIDE DINITRATE 30 MILLIGRAM(S): 5 TABLET ORAL at 12:38

## 2019-08-05 RX ADMIN — CHLORHEXIDINE GLUCONATE 1 APPLICATION(S): 213 SOLUTION TOPICAL at 01:42

## 2019-08-05 RX ADMIN — PANTOPRAZOLE SODIUM 40 MILLIGRAM(S): 20 TABLET, DELAYED RELEASE ORAL at 06:11

## 2019-08-05 RX ADMIN — Medication 50 MILLIGRAM(S): at 12:38

## 2019-08-05 RX ADMIN — Medication 50 MILLIGRAM(S): at 21:47

## 2019-08-05 RX ADMIN — CHLORHEXIDINE GLUCONATE 1 APPLICATION(S): 213 SOLUTION TOPICAL at 21:47

## 2019-08-05 RX ADMIN — ATORVASTATIN CALCIUM 40 MILLIGRAM(S): 80 TABLET, FILM COATED ORAL at 22:01

## 2019-08-05 RX ADMIN — CLOPIDOGREL BISULFATE 75 MILLIGRAM(S): 75 TABLET, FILM COATED ORAL at 12:37

## 2019-08-05 RX ADMIN — Medication 2: at 21:37

## 2019-08-05 RX ADMIN — Medication 36 UNIT(S): at 17:06

## 2019-08-05 RX ADMIN — Medication 100 MILLIGRAM(S): at 17:06

## 2019-08-05 RX ADMIN — Medication 2: at 17:06

## 2019-08-05 RX ADMIN — LIDOCAINE 1 PATCH: 4 CREAM TOPICAL at 21:50

## 2019-08-05 RX ADMIN — Medication 2: at 12:37

## 2019-08-05 RX ADMIN — Medication 32 UNIT(S): at 08:29

## 2019-08-05 RX ADMIN — Medication 25 MILLIGRAM(S): at 06:18

## 2019-08-05 RX ADMIN — Medication 50 MILLIGRAM(S): at 06:11

## 2019-08-05 RX ADMIN — Medication 3 MILLIGRAM(S): at 21:58

## 2019-08-05 RX ADMIN — LIDOCAINE 1 PATCH: 4 CREAM TOPICAL at 09:23

## 2019-08-05 RX ADMIN — LIDOCAINE 1 PATCH: 4 CREAM TOPICAL at 08:30

## 2019-08-05 RX ADMIN — ISOSORBIDE DINITRATE 30 MILLIGRAM(S): 5 TABLET ORAL at 06:16

## 2019-08-05 RX ADMIN — Medication 32 UNIT(S): at 12:37

## 2019-08-05 RX ADMIN — Medication 50 MICROGRAM(S): at 06:12

## 2019-08-05 RX ADMIN — ISOSORBIDE DINITRATE 30 MILLIGRAM(S): 5 TABLET ORAL at 21:47

## 2019-08-05 RX ADMIN — INSULIN GLARGINE 78 UNIT(S): 100 INJECTION, SOLUTION SUBCUTANEOUS at 21:36

## 2019-08-05 RX ADMIN — Medication 81 MILLIGRAM(S): at 12:37

## 2019-08-05 RX ADMIN — HEPARIN SODIUM 5000 UNIT(S): 5000 INJECTION INTRAVENOUS; SUBCUTANEOUS at 06:19

## 2019-08-05 RX ADMIN — HEPARIN SODIUM 5000 UNIT(S): 5000 INJECTION INTRAVENOUS; SUBCUTANEOUS at 17:06

## 2019-08-05 NOTE — PROGRESS NOTE ADULT - SUBJECTIVE AND OBJECTIVE BOX
[facility]   MD GONZALEZ SHAH D.O, PA    TELEPHONE NUMBERS:  OFFICE: [phone]  DR. SANTOYO CELL: [phone]  JUSTEN FIELD CELL: [phone]  DR. RIDER CELL:  [phone]    RENAL FOLLOW UP NOTE  --------------------------------------------------------------------------------  HPI: Pt seen and examined at bedside. Pt states her SOB has improved. Admits to weakness.   Denies SOB, chest pain or LE edema    PAST HISTORY  --------------------------------------------------------------------------------  No significant changes to PMH, PSH, FHx, SHx, unless otherwise noted    ALLERGIES & MEDICATIONS  --------------------------------------------------------------------------------  Allergies    azithromycin (Hives; Pruritus)    Intolerances      Standing Inpatient Medications  acetaminophen  IVPB .. 1000 milliGRAM(s) IV Intermittent once  aspirin enteric coated 81 milliGRAM(s) Oral daily  atorvastatin 40 milliGRAM(s) Oral at bedtime  chlorhexidine 2% Cloths 1 Application(s) Topical daily  clopidogrel Tablet 75 milliGRAM(s) Oral daily  dextrose 5%. 1000 milliLiter(s) IV Continuous <Continuous>  dextrose 50% Injectable 12.5 Gram(s) IV Push once  dextrose 50% Injectable 25 Gram(s) IV Push once  dextrose 50% Injectable 25 Gram(s) IV Push once  docusate sodium 100 milliGRAM(s) Oral two times a day  furosemide   Injectable 60 milliGRAM(s) IV Push every 12 hours  heparin  Injectable 5000 Unit(s) SubCutaneous every 12 hours  hydrALAZINE 50 milliGRAM(s) Oral every 8 hours  insulin glargine Injectable (LANTUS) 74 Unit(s) SubCutaneous at bedtime  insulin lispro (HumaLOG) corrective regimen sliding scale   SubCutaneous three times a day before meals  insulin lispro (HumaLOG) corrective regimen sliding scale   SubCutaneous at bedtime  insulin lispro Injectable (HumaLOG) 36 Unit(s) SubCutaneous before dinner  insulin lispro Injectable (HumaLOG) 32 Unit(s) SubCutaneous before breakfast  insulin lispro Injectable (HumaLOG) 32 Unit(s) SubCutaneous before lunch  isosorbide   dinitrate Tablet (ISORDIL) 30 milliGRAM(s) Oral three times a day  levothyroxine 50 MICROGram(s) Oral daily  lidocaine   Patch 1 Patch Transdermal daily  melatonin 3 milliGRAM(s) Oral at bedtime  metoprolol succinate ER 25 milliGRAM(s) Oral daily  montelukast 10 milliGRAM(s) Oral daily  pantoprazole    Tablet 40 milliGRAM(s) Oral before breakfast  polyethylene glycol 3350 17 Gram(s) Oral daily  senna 2 Tablet(s) Oral at bedtime  spironolactone 25 milliGRAM(s) Oral daily    PRN Inpatient Medications  acetaminophen   Tablet .. 650 milliGRAM(s) Oral every 6 hours PRN  dextrose 40% Gel 15 Gram(s) Oral once PRN  glucagon  Injectable 1 milliGRAM(s) IntraMuscular once PRN      REVIEW OF SYSTEMS  --------------------------------------------------------------------------------  General: no fever  CVS: no chest pain  RESP: no sob, no cough  ABD: no abdominal pain  : no dysuria,  MSK: no edema     VITALS/PHYSICAL EXAM  --------------------------------------------------------------------------------  T(C): 36.8 (08-05-19 @ 04:49), Max: 36.8 (08-05-19 @ 04:49)  HR: 65 (08-05-19 @ 06:20) (65 - 82)  BP: 115/61 (08-05-19 @ 06:20) (102/53 - 137/73)  RR: 18 (08-05-19 @ 04:49) (18 - 18)  SpO2: 100% (08-05-19 @ 04:49) (100% - 100%)  Wt(kg): --    08-04-19 @ 07:01  -  08-05-19 @ 07:00  --------------------------------------------------------  IN: 390 mL / OUT: 950 mL / NET: -560 mL    Physical Exam:  	Gen: NAD  	HEENT: MMM  	Pulm: CTA B/L  	CV: S1S2  	Abd: Soft, +BS  	Ext: No LE edema B/L                      Neuro: Awake   	Skin: Warm and Dry     LABS/STUDIES  --------------------------------------------------------------------------------              10.3   8.2   >-----------<  326      [08-05-19 @ 06:45]              31.9     140  |  98  |  54  ----------------------------<  137      [08-05-19 @ 06:45]  3.5   |  24  |  1.62        Ca     9.7     [08-05-19 @ 06:45]    Creatinine Trend:  SCr 1.62 [08-05 @ 06:45]  SCr 1.75 [08-04 @ 07:02]  SCr 1.64 [08-03 @ 07:08]  SCr 1.73 [08-02 @ 06:11]  SCr 1.65 [08-01 @ 06:46]    Urinalysis - [07-09-19 @ 13:28]      Color Yellow / Appearance Clear / SG 1.012 / pH 6.0      Gluc Negative / Ketone Negative  / Bili Negative / Urobili Negative       Blood Trace / Protein Trace / Leuk Est Negative / Nitrite Negative      RBC 1 / WBC 1 / Hyaline 0 / Gran  / Sq Epi  / Non Sq Epi 0 / Bacteria Moderate      Iron 42, TIBC 348, %sat 12      [07-05-19 @ 22:32]  Ferritin 130      [07-05-19 @ 22:32]  .4 (Ca --)      [04-25-19 @ 05:45]   --  PTH 43.55 (Ca --)      [09-10-18 @ 07:15]   --  PTH 36.60 (Ca --)      [09-08-18 @ 03:15]   --  Vitamin D (25OH) 25.9      [05-01-19 @ 04:00]  HbA1c 7.4      [07-05-19 @ 22:32]  TSH 2.00      [07-05-19 @ 22:32]  Lipid: chol 148, , HDL 35,       [06-01-19 @ 08:00]

## 2019-08-05 NOTE — PROGRESS NOTE ADULT - SUBJECTIVE AND OBJECTIVE BOX
Endocrinology Attending Covering for Dr. Banks      Chief complaint  Patient is a 73y old  Female who presents with a chief complaint of Hypoxia, SOB (05 Aug 2019 10:34)   Review of systems  Patient in bed, looks comfortable, no fever,  had no hypoglycemia.    Labs and Fingersticks  CAPILLARY BLOOD GLUCOSE      POCT Blood Glucose.: 191 mg/dL (05 Aug 2019 07:56)  POCT Blood Glucose.: 210 mg/dL (04 Aug 2019 21:41)  POCT Blood Glucose.: 243 mg/dL (04 Aug 2019 16:30)  POCT Blood Glucose.: 211 mg/dL (04 Aug 2019 11:44)      Anion Gap, Serum: 18 <H> (08-05 @ 06:45)  Anion Gap, Serum: 17 (08-04 @ 07:02)      Calcium, Total Serum: 9.7 (08-05 @ 06:45)  Calcium, Total Serum: 9.4 (08-04 @ 07:02)          08-05    140  |  98  |  54<H>  ----------------------------<  137<H>  3.5   |  24  |  1.62<H>    Ca    9.7      05 Aug 2019 06:45                          10.3   8.2   )-----------( 326      ( 05 Aug 2019 06:45 )             31.9     Medications  MEDICATIONS  (STANDING):  acetaminophen  IVPB .. 1000 milliGRAM(s) IV Intermittent once  aspirin enteric coated 81 milliGRAM(s) Oral daily  atorvastatin 40 milliGRAM(s) Oral at bedtime  chlorhexidine 2% Cloths 1 Application(s) Topical daily  clopidogrel Tablet 75 milliGRAM(s) Oral daily  dextrose 5%. 1000 milliLiter(s) (50 mL/Hr) IV Continuous <Continuous>  dextrose 50% Injectable 12.5 Gram(s) IV Push once  dextrose 50% Injectable 25 Gram(s) IV Push once  dextrose 50% Injectable 25 Gram(s) IV Push once  docusate sodium 100 milliGRAM(s) Oral two times a day  furosemide   Injectable 60 milliGRAM(s) IV Push every 12 hours  heparin  Injectable 5000 Unit(s) SubCutaneous every 12 hours  hydrALAZINE 50 milliGRAM(s) Oral every 8 hours  insulin glargine Injectable (LANTUS) 74 Unit(s) SubCutaneous at bedtime  insulin lispro (HumaLOG) corrective regimen sliding scale   SubCutaneous three times a day before meals  insulin lispro (HumaLOG) corrective regimen sliding scale   SubCutaneous at bedtime  insulin lispro Injectable (HumaLOG) 36 Unit(s) SubCutaneous before dinner  insulin lispro Injectable (HumaLOG) 32 Unit(s) SubCutaneous before breakfast  insulin lispro Injectable (HumaLOG) 32 Unit(s) SubCutaneous before lunch  isosorbide   dinitrate Tablet (ISORDIL) 30 milliGRAM(s) Oral three times a day  levothyroxine 50 MICROGram(s) Oral daily  lidocaine   Patch 1 Patch Transdermal daily  melatonin 3 milliGRAM(s) Oral at bedtime  metoprolol succinate ER 25 milliGRAM(s) Oral daily  montelukast 10 milliGRAM(s) Oral daily  pantoprazole    Tablet 40 milliGRAM(s) Oral before breakfast  polyethylene glycol 3350 17 Gram(s) Oral daily  senna 2 Tablet(s) Oral at bedtime  spironolactone 25 milliGRAM(s) Oral daily      Physical Exam  General: Patient comfortable in bed  Vital Signs Last 12 Hrs  T(F): 98.2 (08-05-19 @ 04:49), Max: 98.2 (08-05-19 @ 04:49)  HR: 65 (08-05-19 @ 06:20) (65 - 68)  BP: 115/61 (08-05-19 @ 06:20) (102/53 - 115/61)  BP(mean): --  RR: 18 (08-05-19 @ 04:49) (18 - 18)  SpO2: 100% (08-05-19 @ 04:49) (100% - 100%)  Neck: No palpable thyroid nodules.  CVS: S1S2, No murmurs  Respiratory: No wheezing, no crepitations  GI: Abdomen soft, bowel sounds positive  Musculoskeletal:  edema lower extremities.   Skin: No skin rashes, no ecchymosis    Diagnostics

## 2019-08-05 NOTE — PROGRESS NOTE ADULT - ASSESSMENT
Pt is a 74 yo woman with PMHx of HTN, T2DM, CAD s/p CABG (LIMA to LAD, SVG to OM, SVG to PDA 2014 at [facility]), non-dilated ICM (EF 20-25%), severe mitral regurgitation s/p mitral clip (6/13/19 Dr. Newman), severe pulm HTN, CKD, hypothyroidism admitted with worsening SOB.    A/P:  MERVIN  Likely sec to cardiogenic shock  Renal function has improved and now remains stable at 1.62 today  Pt currently restarted on IV lasix and remains on oral Spironolactone   Continue diuretic therapy per cardiology.  Optimize glucose control  Monitor renal function   Avoid further nephrotoxics, NSAIDS, RCA    CKD stage 4:  baseline Scr 1.8-2.0. Scr stable at 1.62 today.   Renal function fluctuates sec to CHF  Monitor renal function at present    SOB:  in setting of HF. Improving on diuretic therapy.   Continue diuretics per cardiology    Acidosis   Sec to Renal failure  Improved and is currently at goal   Monitor serum Co2 at present    Hypokalemia:  in the setting of diuretic therapy. Serum potassium below goal.   Advise oral KCL 40meq x 2 today.     Anemia:   Hb stable  Pt with iron deficiency anemia.

## 2019-08-05 NOTE — PROGRESS NOTE ADULT - ASSESSMENT
Assessment  DMT2: 73y Female with DM T2 with hyperglycemia on insulin, blood sugars still  running High  191  this AM .  CAD: S/P cabg On medications, stable, monitored.  Hypothyroidism:  On synthroid 50  mcg po daily, asymptomatic.  HTN: Controlled, On med.  HLD; on statin  CKD: Monitor labs/BMP,         Plan:   DMT2:   Increase  Lantus to 78 units qhs, and    Humalog to 36  units before breakfast and Lunch and 36 units pre-dinner   in addition to correction scale coverage, monitor FS will FU  Patient counseled for compliance with consistent low carb diet.  CAD: S/P cabg On medications, stable, monitored.  Hypothyroidism:  On synthroid 50  mcg po daily, asymptomatic. F/U tsh as outpatient  HTN: Continue treatment, monitoring, Primary team FU  HLD; on statin  CKD: Continue monitoring labs, renal FU  Thank you for consult will F/U  erika Woodard MD  [phone]

## 2019-08-05 NOTE — CHART NOTE - NSCHARTNOTEFT_GEN_A_CORE
Nutrition Follow Up Note  Patient seen for: nutrition follow up on 6TOW    Chart reviewed, events noted. Pt is 74 yo woman with PMHx of HTN, T2DM (last HgbA1c 7.4%), CAD s/p CABG,  non-dilated ICM (EF 20-25%), severe mitral regurgitation s/p mitral clip, severe pulm HTN, CKD, hypothyroidism, presented to ED after experiencing worsening SOB and intermittent chest pain at [facility]. +acute on chronic HF, continues with diuresis. MERVIN on CKD, nephrology following.  Pt POCT elevated still at times, endocrinology following.  Persistent anorexia noted, "maintain nonpharmacologic strategies for now." Multifactorial fatigue noted. Debility noted. Palliative following for GOC, ongoing.    Source: electronic medical record, pt (Slovenian and English speaking-able to make needs known)    Diet : Consistent Carbohydrate (with evening snacks) + Low Fiber + Low Sodium +1500ml Fluid Restriction + Halal + Glucerna 1x/daily (provides additional 220kcal, 10g pro)     Patient reports: no acute GI distress reported. Last BM 8/3.    PO intake : per chart, pt with variable po intake continuing % at times since last follow up. Unclear if pt drinking [employer] health shakes being provided. Nutrition education not indicated at this time. RD remains available.     Source for PO intake: chart, pt     Enteral /Parenteral Nutrition: n/a    Daily Weight in k.2 (-), wt largely stable.   51.7 (-), 52.7 (-), 52.4 (), 52.3 (-), 52.9 ()      Pertinent Medications: MEDICATIONS  (STANDING):  acetaminophen  IVPB .. 1000 milliGRAM(s) IV Intermittent once  aspirin enteric coated 81 milliGRAM(s) Oral daily  atorvastatin 40 milliGRAM(s) Oral at bedtime  chlorhexidine 2% Cloths 1 Application(s) Topical daily  clopidogrel Tablet 75 milliGRAM(s) Oral daily  dextrose 5%. 1000 milliLiter(s) (50 mL/Hr) IV Continuous <Continuous>  dextrose 50% Injectable 12.5 Gram(s) IV Push once  dextrose 50% Injectable 25 Gram(s) IV Push once  dextrose 50% Injectable 25 Gram(s) IV Push once  docusate sodium 100 milliGRAM(s) Oral two times a day  furosemide   Injectable 60 milliGRAM(s) IV Push every 12 hours  heparin  Injectable 5000 Unit(s) SubCutaneous every 12 hours  hydrALAZINE 50 milliGRAM(s) Oral every 8 hours  insulin glargine Injectable (LANTUS) 74 Unit(s) SubCutaneous at bedtime  insulin lispro (HumaLOG) corrective regimen sliding scale   SubCutaneous three times a day before meals  insulin lispro (HumaLOG) corrective regimen sliding scale   SubCutaneous at bedtime  insulin lispro Injectable (HumaLOG) 36 Unit(s) SubCutaneous before dinner  insulin lispro Injectable (HumaLOG) 32 Unit(s) SubCutaneous before breakfast  insulin lispro Injectable (HumaLOG) 32 Unit(s) SubCutaneous before lunch  isosorbide   dinitrate Tablet (ISORDIL) 30 milliGRAM(s) Oral three times a day  levothyroxine 50 MICROGram(s) Oral daily  lidocaine   Patch 1 Patch Transdermal daily  melatonin 3 milliGRAM(s) Oral at bedtime  metoprolol succinate ER 25 milliGRAM(s) Oral daily  montelukast 10 milliGRAM(s) Oral daily  pantoprazole    Tablet 40 milliGRAM(s) Oral before breakfast  polyethylene glycol 3350 17 Gram(s) Oral daily  senna 2 Tablet(s) Oral at bedtime  spironolactone 25 milliGRAM(s) Oral daily    MEDICATIONS  (PRN):  acetaminophen   Tablet .. 650 milliGRAM(s) Oral every 6 hours PRN Mild Pain (1 - 3), Moderate Pain (4 - 6)  dextrose 40% Gel 15 Gram(s) Oral once PRN Blood Glucose LESS THAN 70 milliGRAM(s)/deciliter  glucagon  Injectable 1 milliGRAM(s) IntraMuscular once PRN Glucose LESS THAN 70 milligrams/deciliter    Pertinent Labs:  @ 06:45: Na 140, BUN 54<H>, Cr 1.62<H>, <H>, K+ 3.5, Phos --, Mg --, Alk Phos --, ALT/SGPT --, AST/SGOT --, HbA1c --    Finger Sticks:  POCT Blood Glucose.: 191 mg/dL ( @ 07:56)  POCT Blood Glucose.: 210 mg/dL ( @ 21:41)  POCT Blood Glucose.: 243 mg/dL ( @ 16:30)  POCT Blood Glucose.: 211 mg/dL ( @ 11:44)      Skin per nursing documentation: no pressure injuries noted at this time  Edema: none noted    Estimated Needs:   [x] no change since previous assessment  [ ] recalculated:     Previous Nutrition Diagnosis: Predicted suboptimal energy intake  Nutrition Diagnosis is: ongoing, variable po intake noted, pt ordered for oral nutrition supplement, however states she is not drinking it. Pt medical condition precludes additional nutrition interventions at this time.    New Nutrition Diagnosis: n/a    Recommend  1) Recommend liberalize diet to Consistent Carbohydrate (with evening snacks) + Low Sodium + Halal. Fluids deferred to team. Promote adequate BG control, recommend adjust insulin as needed.  2) Recommend discontinue glucerna supplement as pt states she is not drinking it. Continue [employer] health shakes.  3) Recommend consider addition of renal multivitamin  4) Monitor GOC.    Monitoring and Evaluation:     Continue to monitor Nutritional intake, Tolerance to diet prescription, weights, labs, skin integrity    RD remains available upon request and will follow up per protocol. Janneth Wang RD, CDN Pager: [phone].

## 2019-08-05 NOTE — PROGRESS NOTE ADULT - SUBJECTIVE AND OBJECTIVE BOX
Patient seen and examined at bedside.    Overnight Events:       REVIEW OF SYSTEMS:  Constitutional:     [ ] negative [ ] fevers [ ] chills [ ] weight loss [ ] weight gain  HEENT:                  [ ] negative [ ] dry eyes [ ] eye irritation [ ] postnasal drip [ ] nasal congestion  CV:                         [ ] negative  [ ] chest pain [ ] orthopnea [ ] palpitations [ ] murmur  Resp:                     [ ] negative [ ] cough [ ] shortness of breath [ ] dyspnea [ ] wheezing [ ] sputum [ ]hemoptysis  GI:                          [ ] negative [ ] nausea [ ] vomiting [ ] diarrhea [ ] constipation [ ] abd pain [ ] dysphagia   :                        [ ] negative [ ] dysuria [ ] nocturia [ ] hematuria [ ] increased urinary frequency  Musculoskeletal: [ ] negative [ ] back pain [ ] myalgias [ ] arthralgias [ ] fracture  Skin:                       [ ] negative [ ] rash [ ] itch  Neurological:        [ ] negative [ ] headache [ ] dizziness [ ] syncope [ ] weakness [ ] numbness  Psychiatric:           [ ] negative [ ] anxiety [ ] depression  Endocrine:            [ ] negative [ ] diabetes [ ] thyroid problem  Heme/Lymph:      [ ] negative [ ] anemia [ ] bleeding problem  Allergic/Immune: [ ] negative [ ] itchy eyes [ ] nasal discharge [ ] hives [ ] angioedema    [ ] All other systems negative  [ ] Unable to assess ROS due to    Current Meds:  acetaminophen   Tablet .. 650 milliGRAM(s) Oral every 6 hours PRN  acetaminophen  IVPB .. 1000 milliGRAM(s) IV Intermittent once  aspirin enteric coated 81 milliGRAM(s) Oral daily  atorvastatin 40 milliGRAM(s) Oral at bedtime  chlorhexidine 2% Cloths 1 Application(s) Topical daily  clopidogrel Tablet 75 milliGRAM(s) Oral daily  dextrose 40% Gel 15 Gram(s) Oral once PRN  dextrose 5%. 1000 milliLiter(s) IV Continuous <Continuous>  dextrose 50% Injectable 12.5 Gram(s) IV Push once  dextrose 50% Injectable 25 Gram(s) IV Push once  dextrose 50% Injectable 25 Gram(s) IV Push once  docusate sodium 100 milliGRAM(s) Oral two times a day  furosemide   Injectable 60 milliGRAM(s) IV Push every 12 hours  glucagon  Injectable 1 milliGRAM(s) IntraMuscular once PRN  heparin  Injectable 5000 Unit(s) SubCutaneous every 12 hours  hydrALAZINE 50 milliGRAM(s) Oral every 8 hours  insulin glargine Injectable (LANTUS) 74 Unit(s) SubCutaneous at bedtime  insulin lispro (HumaLOG) corrective regimen sliding scale   SubCutaneous three times a day before meals  insulin lispro (HumaLOG) corrective regimen sliding scale   SubCutaneous at bedtime  insulin lispro Injectable (HumaLOG) 36 Unit(s) SubCutaneous before dinner  insulin lispro Injectable (HumaLOG) 32 Unit(s) SubCutaneous before breakfast  insulin lispro Injectable (HumaLOG) 32 Unit(s) SubCutaneous before lunch  isosorbide   dinitrate Tablet (ISORDIL) 30 milliGRAM(s) Oral three times a day  levothyroxine 50 MICROGram(s) Oral daily  lidocaine   Patch 1 Patch Transdermal daily  melatonin 3 milliGRAM(s) Oral at bedtime  metoprolol succinate ER 25 milliGRAM(s) Oral daily  montelukast 10 milliGRAM(s) Oral daily  pantoprazole    Tablet 40 milliGRAM(s) Oral before breakfast  polyethylene glycol 3350 17 Gram(s) Oral daily  senna 2 Tablet(s) Oral at bedtime  spironolactone 25 milliGRAM(s) Oral daily      PAST MEDICAL & SURGICAL HISTORY:  GERD (gastroesophageal reflux disease)  CAD (coronary artery disease): s/p CABG  Hypothyroidism  Hyperlipidemia  Diabetes mellitus, type 2  S/P CABG (coronary artery bypass graft)      Vitals:  T(F): 98.2 (08-05), Max: 98.2 (08-05)  HR: 65 (08-05) (65 - 82)  BP: 115/61 (08-05) (102/53 - 137/73)  RR: 18 (08-05)  SpO2: 100% (08-05)  I&O's Summary    04 Aug 2019 07:01  -  05 Aug 2019 07:00  --------------------------------------------------------  IN: 390 mL / OUT: 950 mL / NET: -560 mL        Physical Exam:  Appearance: Weak appearing   Eyes: PERRL, EOMI, pink conjunctiva  HENT: Normal oral mucosa  Cardiovascular: RRR, S1, S2, no murmurs, rubs, or gallops; no edema; mild elevation in JVP  Respiratory: Clear to auscultation bilaterally  Gastrointestinal: soft, non-tender, non-distended with normal bowel sounds  Musculoskeletal: No clubbing; no joint deformity   Neurologic: Non-focal  Lymphatic: No lymphadenopathy  Psychiatry: AAOx3, mood & affect appropriate  Skin: No rashes, ecchymoses, or cyanosis                          10.3   8.2   )-----------( 326      ( 05 Aug 2019 06:45 )             31.9     08-05    140  |  98  |  54<H>  ----------------------------<  137<H>  3.5   |  24  |  1.62<H>    Ca    9.7      05 Aug 2019 06:45                    New ECG(s): Personally reviewed    Echo:  < from: TTE with Doppler (w/Cont) (07.09.19 @ 07:49) >  Conclusions:  1. Severe global left ventricular systolic dysfunction.  Endocardial visualization enhanced with intravenous  injection of Ultrasonic Enhancing Agent (Definity).  2. The right ventricle is not well visualized; grossly  normal right ventricular systolic function.  3. Estimated right ventricular systolic pressure equals 67  mm Hg, assuming right atrial pressure equals 15 mm Hg,  consistent with severe pulmonary hypertension.  4. Normal tricuspid valve. Mild-moderate tricuspid  regurgitation.    < end of copied text >      Interpretation of Telemetry: Sinus 60-70

## 2019-08-05 NOTE — PROGRESS NOTE ADULT - ASSESSMENT
72 yo woman with PMHx of HTN, T2DM (A1c 7.4%), CAD s/p CABG (LIMA to LAD, SVG to OM, SVG to PDA 2014 at [facility]), non-dilated ICM (EF 20-25%), severe mitral regurgitation s/p mitral clip (6/13/19 Dr. eNwman), severe pulm HTN, CKD, hypothyroidism, who is presenting to ED after experiencing worsening SOB and intermittent [facility]. Of note, pt was recently discharged on 6/19 after having mitral clip procedure completed. She initially required BiPAP with improvement in her respiratory status following diuresis. Initiated on vasopressors and inotropes in CCU, which were subsequently weaned off. Infectious work up was negative.    #HFrEF likely 2/2 ICM with MR s/p alonso clip  - c/w Lasix 60 IV BID for today.   - Will likely switch back to torsemide 100 mg daily tomorrow. May add metolazone a few times a week.   - Check daily standing weights, Is and Os, and daily lactates.   - continue hydralazine 50 mg TID and isordil 30 TID  - continue spironolactone 25 mg  - continue Metoprolol Succinate 25 mg PO Daily   - No further cardiac testing at this time.      #CAD  - continue ASA and statin   - Pt with chest pain, but also with end stage cardiomyopathy, coronary disease, had MitraClip and no further intervention feasible. She should not have trops checked.    #SVT - likely Atrial Tach vs. Atrial Flutter   - Amio initially not given due to elevated LFTs likely in the setting of congestion.   - recheck LFTs.   - Monitor on tele.   - continue metoprolol at current dose as above  - If pt has SVT again can consider jaky Marshall MD  Cardiology Fellow - PGY 5  Text or Call: [phone]  For all New Consults and Questions:  [email or web address]   Login: clif

## 2019-08-05 NOTE — CHART NOTE - NSCHARTNOTEFT_GEN_A_CORE
MEDICINE NP NOTE  Spectra [id number]    73 year old female PMH HTN, DM Type 2, CAD s/p CABG (LIMA to LAD, SVG to OM, SVG to PDA 2014 at [facility]), non-dilated ICM (EF 20-25%), severe mitral regurgitation s/p mitral clip, severe pulm HTN, CKD, hypothyroidism admitted with acute on chronic systolic CHF with respiratory failure.  Patient has on going issues with deconditioning and generalized weakness secondary to the diagnoses listed above.  Patient will require a semi-electric hospital bed.  This is necessary to achieve positioning and elevation not able to be obtained in an ordinary bed.  Bed pillows and wedges have been tried and ruled out.  This patient requires a gel mattress overlay to prevent pressure ulcers.

## 2019-08-05 NOTE — PROGRESS NOTE ADULT - SUBJECTIVE AND OBJECTIVE BOX
CHIEF COMPLAINT:    SUBJECTIVE:     REVIEW OF SYSTEMS:    CONSTITUTIONAL: (  )  weakness,  (  ) fevers or chills  EYES/ENT: (  )visual changes;     NECK: (  ) pain or stiffness  RESPIRATORY:   (  )cough, wheezing, hemoptysis;  (  ) shortness of breath  CARDIOVASCULAR:  (  )chest pain or palpitations  GASTROINTESTINAL:   (  )abdominal or epigastric pain.  (  ) nausea, vomiting, or hematemesis;   (   ) diarrhea or constipation.   GENITOURINARY:   (    ) dysuria, frequency or hematuria  NEUROLOGICAL:  (   ) numbness or weakness   All other review of systems is negative unless indicated above    Vital Signs Last 24 Hrs  T(C): 36.8 (05 Aug 2019 04:49), Max: 36.8 (05 Aug 2019 04:49)  T(F): 98.2 (05 Aug 2019 04:49), Max: 98.2 (05 Aug 2019 04:49)  HR: 65 (05 Aug 2019 06:20) (65 - 82)  BP: 115/61 (05 Aug 2019 06:20) (102/53 - 137/73)  BP(mean): --  RR: 18 (05 Aug 2019 04:49) (18 - 18)  SpO2: 100% (05 Aug 2019 04:49) (100% - 100%)    I&O's Summary    04 Aug 2019 07:01  -  05 Aug 2019 07:00  --------------------------------------------------------  IN: 390 mL / OUT: 950 mL / NET: -560 mL        CAPILLARY BLOOD GLUCOSE      POCT Blood Glucose.: 191 mg/dL (05 Aug 2019 07:56)  POCT Blood Glucose.: 210 mg/dL (04 Aug 2019 21:41)  POCT Blood Glucose.: 243 mg/dL (04 Aug 2019 16:30)  POCT Blood Glucose.: 211 mg/dL (04 Aug 2019 11:44)      PHYSICAL EXAM:    Constitutional:  (   ) NAD,   (   )awake and alert  HEENT: PERR, EOMI,    Neck: Soft and supple, No LAD, No JVD  Respiratory:  (    Breath sounds are clear bilaterally,    (   ) wheezing, rales or rhonchi  Cardiovascular:     (   )S1 and S2, regular rate and rhythm, no Murmurs, gallops or rubs  Gastrointestinal:  (   )Bowel Sounds present, soft,   (  )nontender, nondistended,    Extremities:    (  ) peripheral edema  Vascular: 2+ peripheral pulses  Neurological:    (    )A/O x 3,   (  ) focal deficits  Musculoskeletal:    (   )  normal strength b/l upper  (     ) normal  lower extremities  Skin: No rashes    MEDICATIONS:  MEDICATIONS  (STANDING):  acetaminophen  IVPB .. 1000 milliGRAM(s) IV Intermittent once  aspirin enteric coated 81 milliGRAM(s) Oral daily  atorvastatin 40 milliGRAM(s) Oral at bedtime  chlorhexidine 2% Cloths 1 Application(s) Topical daily  clopidogrel Tablet 75 milliGRAM(s) Oral daily  dextrose 5%. 1000 milliLiter(s) (50 mL/Hr) IV Continuous <Continuous>  dextrose 50% Injectable 12.5 Gram(s) IV Push once  dextrose 50% Injectable 25 Gram(s) IV Push once  dextrose 50% Injectable 25 Gram(s) IV Push once  docusate sodium 100 milliGRAM(s) Oral two times a day  furosemide   Injectable 60 milliGRAM(s) IV Push every 12 hours  heparin  Injectable 5000 Unit(s) SubCutaneous every 12 hours  hydrALAZINE 50 milliGRAM(s) Oral every 8 hours  insulin glargine Injectable (LANTUS) 74 Unit(s) SubCutaneous at bedtime  insulin lispro (HumaLOG) corrective regimen sliding scale   SubCutaneous three times a day before meals  insulin lispro (HumaLOG) corrective regimen sliding scale   SubCutaneous at bedtime  insulin lispro Injectable (HumaLOG) 36 Unit(s) SubCutaneous before dinner  insulin lispro Injectable (HumaLOG) 32 Unit(s) SubCutaneous before breakfast  insulin lispro Injectable (HumaLOG) 32 Unit(s) SubCutaneous before lunch  isosorbide   dinitrate Tablet (ISORDIL) 30 milliGRAM(s) Oral three times a day  levothyroxine 50 MICROGram(s) Oral daily  lidocaine   Patch 1 Patch Transdermal daily  melatonin 3 milliGRAM(s) Oral at bedtime  metoprolol succinate ER 25 milliGRAM(s) Oral daily  montelukast 10 milliGRAM(s) Oral daily  pantoprazole    Tablet 40 milliGRAM(s) Oral before breakfast  polyethylene glycol 3350 17 Gram(s) Oral daily  senna 2 Tablet(s) Oral at bedtime  spironolactone 25 milliGRAM(s) Oral daily      LABS: All Labs Reviewed:                        10.3   8.2   )-----------( 326      ( 05 Aug 2019 06:45 )             31.9     08-05    140  |  98  |  54<H>  ----------------------------<  137<H>  3.5   |  24  |  1.62<H>    Ca    9.7      05 Aug 2019 06:45            Blood Culture:   Urine Culture      RADIOLOGY/EKG:    ASSESSMENT AND PLAN:    DVT PPX:    ADVANCED DIRECTIVE:    DISPOSITION: CHIEF COMPLAINT: Patient today does not feel well  (I am  very very sick)    SUBJECTIVE:     REVIEW OF SYSTEMS:    CONSTITUTIONAL: (  )  weakness,  (  ) fevers or chills  EYES/ENT: (  )visual changes;     NECK: (  ) pain or stiffness  RESPIRATORY:   (  )cough, wheezing, hemoptysis;  (  ) shortness of breath  CARDIOVASCULAR:  (  )chest pain or palpitations  GASTROINTESTINAL:   (  )abdominal or epigastric pain.  (  ) nausea, vomiting, or hematemesis;   (   ) diarrhea or constipation.   GENITOURINARY:   (    ) dysuria, frequency or hematuria  NEUROLOGICAL:  (   ) numbness or weakness   All other review of systems is negative unless indicated above    Vital Signs Last 24 Hrs  T(C): 36.8 (05 Aug 2019 04:49), Max: 36.8 (05 Aug 2019 04:49)  T(F): 98.2 (05 Aug 2019 04:49), Max: 98.2 (05 Aug 2019 04:49)  HR: 65 (05 Aug 2019 06:20) (65 - 82)  BP: 115/61 (05 Aug 2019 06:20) (102/53 - 137/73)  BP(mean): --  RR: 18 (05 Aug 2019 04:49) (18 - 18)  SpO2: 100% (05 Aug 2019 04:49) (100% - 100%)    I&O's Summary    04 Aug 2019 07:01  -  05 Aug 2019 07:00  --------------------------------------------------------  IN: 390 mL / OUT: 950 mL / NET: -560 mL        CAPILLARY BLOOD GLUCOSE      POCT Blood Glucose.: 191 mg/dL (05 Aug 2019 07:56)  POCT Blood Glucose.: 210 mg/dL (04 Aug 2019 21:41)  POCT Blood Glucose.: 243 mg/dL (04 Aug 2019 16:30)  POCT Blood Glucose.: 211 mg/dL (04 Aug 2019 11:44)      PHYSICAL EXAM:  Appearance: Weak appearing   Eyes: PERRL, EOMI, pink conjunctiva  HENT: Normal oral mucosa  Cardiovascular: RRR, S1, S2, no murmurs, rubs, or gallops; no edema; mild elevation in JVP  Respiratory: Clear to auscultation bilaterally  Gastrointestinal: soft, non-tender, non-distended with normal bowel sounds  Musculoskeletal: No clubbing; no joint deformity   Neurologic: Non-focal  Lymphatic: No lymphadenopathy  Psychiatry: AAOx3, mood & affect appropriate  Skin: No rashes, ecchymoses, or cyanosis     MEDICATIONS:  MEDICATIONS  (STANDING):  acetaminophen  IVPB .. 1000 milliGRAM(s) IV Intermittent once  aspirin enteric coated 81 milliGRAM(s) Oral daily  atorvastatin 40 milliGRAM(s) Oral at bedtime  chlorhexidine 2% Cloths 1 Application(s) Topical daily  clopidogrel Tablet 75 milliGRAM(s) Oral daily  dextrose 5%. 1000 milliLiter(s) (50 mL/Hr) IV Continuous <Continuous>  dextrose 50% Injectable 12.5 Gram(s) IV Push once  dextrose 50% Injectable 25 Gram(s) IV Push once  dextrose 50% Injectable 25 Gram(s) IV Push once  docusate sodium 100 milliGRAM(s) Oral two times a day  furosemide   Injectable 60 milliGRAM(s) IV Push every 12 hours  heparin  Injectable 5000 Unit(s) SubCutaneous every 12 hours  hydrALAZINE 50 milliGRAM(s) Oral every 8 hours  insulin glargine Injectable (LANTUS) 74 Unit(s) SubCutaneous at bedtime  insulin lispro (HumaLOG) corrective regimen sliding scale   SubCutaneous three times a day before meals  insulin lispro (HumaLOG) corrective regimen sliding scale   SubCutaneous at bedtime  insulin lispro Injectable (HumaLOG) 36 Unit(s) SubCutaneous before dinner  insulin lispro Injectable (HumaLOG) 32 Unit(s) SubCutaneous before breakfast  insulin lispro Injectable (HumaLOG) 32 Unit(s) SubCutaneous before lunch  isosorbide   dinitrate Tablet (ISORDIL) 30 milliGRAM(s) Oral three times a day  levothyroxine 50 MICROGram(s) Oral daily  lidocaine   Patch 1 Patch Transdermal daily  melatonin 3 milliGRAM(s) Oral at bedtime  metoprolol succinate ER 25 milliGRAM(s) Oral daily  montelukast 10 milliGRAM(s) Oral daily  pantoprazole    Tablet 40 milliGRAM(s) Oral before breakfast  polyethylene glycol 3350 17 Gram(s) Oral daily  senna 2 Tablet(s) Oral at bedtime  spironolactone 25 milliGRAM(s) Oral daily      LABS: All Labs Reviewed:                        10.3   8.2   )-----------( 326      ( 05 Aug 2019 06:45 )             31.9     08-05    140  |  98  |  54<H>  ----------------------------<  137<H>  3.5   |  24  |  1.62<H>    Ca    9.7      05 Aug 2019 06:45            Blood Culture:   Urine Culture      RADIOLOGY/EKG:    ASSESSMENT AND PLAN:  74 yo woman with PMHx of HTN, T2DM (A1c 7.4%), CAD s/p CABG (LIMA to LAD, SVG to OM, SVG to PDA 2014 at [facility]), non-dilated ICM (EF 20-25%), severe mitral regurgitation s/p mitral clip (6/13/19 Dr. Newman), severe pulm HTN, CKD, hypothyroidism, who is presenting to ED after experiencing worsening SO[facility] and [facility] Of note, pt was recently discharged on 6/19 after having mitral clip procedure completed. She initially required BiPAP with improvement in her respiratory status following diuresis. Initiated on vasopressors and inotropes in CCU, which were subsequently weaned off. Infectious work up was negative.    #HFrEF likely 2/2 ICM with MR s/p alonso clip  - c/w Lasix 60 IV BID for today.   - Will likely switch back to torsemide 100 mg daily tomorrow. May add metolazone a few times a week.   - Check daily standing weights, Is and Os, and daily lactates.   - continue hydralazine 50 mg TID and isordil 30 TID  - continue spironolactone 25 mg  - continue Metoprolol Succinate 25 mg PO Daily   - No further cardiac testing at this time.      #CAD  - continue ASA and statin   - Pt with chest pain, but also with end stage cardiomyopathy, coronary disease, had MitraClip and no further intervention feasible. She should not have trops checked.    #SVT - likely Atrial Tach vs. Atrial Flutter   - Amio initially not given due to elevated LFTs likely in the setting of congestion.   - recheck LFTs.   - Monitor on tele.   - continue metoprolol at current dose as above  - If pt has SVT again can consider amio      DVT PPX:    ADVANCED DIRECTIVE:    DISPOSITION: We will discuss discuss with her family postpone discharge at the present time secondary to her chest pain last night and she is back on IV Lasix

## 2019-08-06 LAB
ANION GAP SERPL CALC-SCNC: 17 MMOL/L — SIGNIFICANT CHANGE UP (ref 5–17)
BUN SERPL-MCNC: 65 MG/DL — HIGH (ref 7–23)
CALCIUM SERPL-MCNC: 9.9 MG/DL — SIGNIFICANT CHANGE UP (ref 8.4–10.5)
CHLORIDE SERPL-SCNC: 98 MMOL/L — SIGNIFICANT CHANGE UP (ref 96–108)
CO2 SERPL-SCNC: 23 MMOL/L — SIGNIFICANT CHANGE UP (ref 22–31)
CREAT SERPL-MCNC: 1.87 MG/DL — HIGH (ref 0.5–1.3)
GLUCOSE BLDC GLUCOMTR-MCNC: 184 MG/DL — HIGH (ref 70–99)
GLUCOSE BLDC GLUCOMTR-MCNC: 245 MG/DL — HIGH (ref 70–99)
GLUCOSE BLDC GLUCOMTR-MCNC: 317 MG/DL — HIGH (ref 70–99)
GLUCOSE BLDC GLUCOMTR-MCNC: 372 MG/DL — HIGH (ref 70–99)
GLUCOSE SERPL-MCNC: 255 MG/DL — HIGH (ref 70–99)
HCT VFR BLD CALC: 30 % — LOW (ref 34.5–45)
HGB BLD-MCNC: 9.4 G/DL — LOW (ref 11.5–15.5)
MCHC RBC-ENTMCNC: 28.5 PG — SIGNIFICANT CHANGE UP (ref 27–34)
MCHC RBC-ENTMCNC: 31.3 GM/DL — LOW (ref 32–36)
MCV RBC AUTO: 90.9 FL — SIGNIFICANT CHANGE UP (ref 80–100)
PLATELET # BLD AUTO: 285 K/UL — SIGNIFICANT CHANGE UP (ref 150–400)
POTASSIUM SERPL-MCNC: 3.6 MMOL/L — SIGNIFICANT CHANGE UP (ref 3.5–5.3)
POTASSIUM SERPL-SCNC: 3.6 MMOL/L — SIGNIFICANT CHANGE UP (ref 3.5–5.3)
RBC # BLD: 3.3 M/UL — LOW (ref 3.8–5.2)
RBC # FLD: 16.3 % — HIGH (ref 10.3–14.5)
SODIUM SERPL-SCNC: 138 MMOL/L — SIGNIFICANT CHANGE UP (ref 135–145)
WBC # BLD: 7.46 K/UL — SIGNIFICANT CHANGE UP (ref 3.8–10.5)
WBC # FLD AUTO: 7.46 K/UL — SIGNIFICANT CHANGE UP (ref 3.8–10.5)

## 2019-08-06 RX ORDER — INSULIN LISPRO 100/ML
36 VIAL (ML) SUBCUTANEOUS
Refills: 0 | Status: DISCONTINUED | OUTPATIENT
Start: 2019-08-06 | End: 2019-08-07

## 2019-08-06 RX ORDER — INSULIN GLARGINE 100 [IU]/ML
78 INJECTION, SOLUTION SUBCUTANEOUS AT BEDTIME
Refills: 0 | Status: DISCONTINUED | OUTPATIENT
Start: 2019-08-06 | End: 2019-08-07

## 2019-08-06 RX ORDER — INSULIN GLARGINE 100 [IU]/ML
78 INJECTION, SOLUTION SUBCUTANEOUS
Refills: 0 | Status: DISCONTINUED | OUTPATIENT
Start: 2019-08-06 | End: 2019-08-06

## 2019-08-06 RX ORDER — INSULIN LISPRO 100/ML
40 VIAL (ML) SUBCUTANEOUS
Refills: 0 | Status: DISCONTINUED | OUTPATIENT
Start: 2019-08-06 | End: 2019-08-07

## 2019-08-06 RX ADMIN — Medication 1: at 17:19

## 2019-08-06 RX ADMIN — Medication 3 MILLIGRAM(S): at 21:06

## 2019-08-06 RX ADMIN — SPIRONOLACTONE 25 MILLIGRAM(S): 25 TABLET, FILM COATED ORAL at 06:00

## 2019-08-06 RX ADMIN — Medication 40 UNIT(S): at 17:19

## 2019-08-06 RX ADMIN — LIDOCAINE 1 PATCH: 4 CREAM TOPICAL at 21:06

## 2019-08-06 RX ADMIN — MONTELUKAST 10 MILLIGRAM(S): 4 TABLET, CHEWABLE ORAL at 12:34

## 2019-08-06 RX ADMIN — ISOSORBIDE DINITRATE 30 MILLIGRAM(S): 5 TABLET ORAL at 06:00

## 2019-08-06 RX ADMIN — Medication 60 MILLIGRAM(S): at 06:01

## 2019-08-06 RX ADMIN — Medication 100 MILLIGRAM(S): at 06:00

## 2019-08-06 RX ADMIN — POLYETHYLENE GLYCOL 3350 17 GRAM(S): 17 POWDER, FOR SOLUTION ORAL at 12:34

## 2019-08-06 RX ADMIN — Medication 25 MILLIGRAM(S): at 06:01

## 2019-08-06 RX ADMIN — Medication 50 MILLIGRAM(S): at 12:34

## 2019-08-06 RX ADMIN — ISOSORBIDE DINITRATE 30 MILLIGRAM(S): 5 TABLET ORAL at 12:34

## 2019-08-06 RX ADMIN — PANTOPRAZOLE SODIUM 40 MILLIGRAM(S): 20 TABLET, DELAYED RELEASE ORAL at 06:00

## 2019-08-06 RX ADMIN — HEPARIN SODIUM 5000 UNIT(S): 5000 INJECTION INTRAVENOUS; SUBCUTANEOUS at 06:01

## 2019-08-06 RX ADMIN — Medication 100 MILLIGRAM(S): at 17:20

## 2019-08-06 RX ADMIN — Medication 50 MICROGRAM(S): at 06:00

## 2019-08-06 RX ADMIN — Medication 50 MILLIGRAM(S): at 06:00

## 2019-08-06 RX ADMIN — HEPARIN SODIUM 5000 UNIT(S): 5000 INJECTION INTRAVENOUS; SUBCUTANEOUS at 17:20

## 2019-08-06 RX ADMIN — Medication 4: at 08:10

## 2019-08-06 RX ADMIN — ISOSORBIDE DINITRATE 30 MILLIGRAM(S): 5 TABLET ORAL at 21:06

## 2019-08-06 RX ADMIN — LIDOCAINE 1 PATCH: 4 CREAM TOPICAL at 08:12

## 2019-08-06 RX ADMIN — Medication 36 UNIT(S): at 08:11

## 2019-08-06 RX ADMIN — Medication 36 UNIT(S): at 14:16

## 2019-08-06 RX ADMIN — ATORVASTATIN CALCIUM 40 MILLIGRAM(S): 80 TABLET, FILM COATED ORAL at 21:06

## 2019-08-06 RX ADMIN — Medication 60 MILLIGRAM(S): at 17:19

## 2019-08-06 RX ADMIN — INSULIN GLARGINE 78 UNIT(S): 100 INJECTION, SOLUTION SUBCUTANEOUS at 22:00

## 2019-08-06 RX ADMIN — LIDOCAINE 1 PATCH: 4 CREAM TOPICAL at 10:14

## 2019-08-06 RX ADMIN — CHLORHEXIDINE GLUCONATE 1 APPLICATION(S): 213 SOLUTION TOPICAL at 21:06

## 2019-08-06 RX ADMIN — Medication 5: at 12:33

## 2019-08-06 RX ADMIN — SENNA PLUS 2 TABLET(S): 8.6 TABLET ORAL at 21:06

## 2019-08-06 RX ADMIN — Medication 50 MILLIGRAM(S): at 21:06

## 2019-08-06 RX ADMIN — Medication 81 MILLIGRAM(S): at 12:33

## 2019-08-06 RX ADMIN — CLOPIDOGREL BISULFATE 75 MILLIGRAM(S): 75 TABLET, FILM COATED ORAL at 12:34

## 2019-08-06 NOTE — PROGRESS NOTE ADULT - SUBJECTIVE AND OBJECTIVE BOX
[facility]   MD GONZALEZ SHAH D.O, PA    TELEPHONE NUMBERS:  OFFICE: [phone]  DR. SANTOYO CELL: [phone]  JUSTEN FIELD CELL: [phone]  DR. RIDER CELL:  [phone]    RENAL FOLLOW UP NOTE  --------------------------------------------------------------------------------  HPI: Pt seen and examined at bedside. Pt denies SOB. admits to weakness.   Denies SOB, chest pain     PAST HISTORY  --------------------------------------------------------------------------------  No significant changes to PMH, PSH, FHx, SHx, unless otherwise noted    ALLERGIES & MEDICATIONS  --------------------------------------------------------------------------------  Allergies    azithromycin (Hives; Pruritus)    Intolerances      Standing Inpatient Medications  acetaminophen  IVPB .. 1000 milliGRAM(s) IV Intermittent once  aspirin enteric coated 81 milliGRAM(s) Oral daily  atorvastatin 40 milliGRAM(s) Oral at bedtime  chlorhexidine 2% Cloths 1 Application(s) Topical daily  clopidogrel Tablet 75 milliGRAM(s) Oral daily  dextrose 5%. 1000 milliLiter(s) IV Continuous <Continuous>  dextrose 50% Injectable 12.5 Gram(s) IV Push once  dextrose 50% Injectable 25 Gram(s) IV Push once  dextrose 50% Injectable 25 Gram(s) IV Push once  docusate sodium 100 milliGRAM(s) Oral two times a day  furosemide   Injectable 60 milliGRAM(s) IV Push every 12 hours  heparin  Injectable 5000 Unit(s) SubCutaneous every 12 hours  hydrALAZINE 50 milliGRAM(s) Oral every 8 hours  insulin glargine Injectable (LANTUS) 78 Unit(s) SubCutaneous at bedtime  insulin lispro (HumaLOG) corrective regimen sliding scale   SubCutaneous three times a day before meals  insulin lispro (HumaLOG) corrective regimen sliding scale   SubCutaneous at bedtime  insulin lispro Injectable (HumaLOG) 36 Unit(s) SubCutaneous three times a day before meals  isosorbide   dinitrate Tablet (ISORDIL) 30 milliGRAM(s) Oral three times a day  levothyroxine 50 MICROGram(s) Oral daily  lidocaine   Patch 1 Patch Transdermal daily  melatonin 3 milliGRAM(s) Oral at bedtime  metoprolol succinate ER 25 milliGRAM(s) Oral daily  montelukast 10 milliGRAM(s) Oral daily  pantoprazole    Tablet 40 milliGRAM(s) Oral before breakfast  polyethylene glycol 3350 17 Gram(s) Oral daily  senna 2 Tablet(s) Oral at bedtime  spironolactone 25 milliGRAM(s) Oral daily    PRN Inpatient Medications  acetaminophen   Tablet .. 650 milliGRAM(s) Oral every 6 hours PRN  dextrose 40% Gel 15 Gram(s) Oral once PRN  glucagon  Injectable 1 milliGRAM(s) IntraMuscular once PRN      REVIEW OF SYSTEMS  --------------------------------------------------------------------------------  General: no fever  CVS: no chest pain  RESP: no sob, no cough  ABD: no abdominal pain  : no dysuria,  MSK: no edema     VITALS/PHYSICAL EXAM  --------------------------------------------------------------------------------  T(C): 36.9 (08-06-19 @ 04:23), Max: 36.9 (08-06-19 @ 04:23)  HR: 62 (08-06-19 @ 04:23) (60 - 75)  BP: 117/56 (08-06-19 @ 04:23) (103/61 - 134/64)  RR: 18 (08-06-19 @ 04:23) (18 - 18)  SpO2: 100% (08-06-19 @ 04:23) (100% - 100%)  Wt(kg): --        08-05-19 @ 07:01  -  08-06-19 @ 07:00  --------------------------------------------------------  IN: 620 mL / OUT: 950 mL / NET: -330 mL    08-06-19 @ 07:01  -  08-06-19 @ 09:19  --------------------------------------------------------  IN: 220 mL / OUT: 0 mL / NET: 220 mL      Physical Exam:  	Gen: NAD  	HEENT: MMM  	Pulm: CTA B/L  	CV: S1S2  	Abd: Soft, +BS  	Ext: No LE edema B/L                      Neuro: Awake   	Skin: Warm and Dry       LABS/STUDIES  --------------------------------------------------------------------------------              10.3   8.2   >-----------<  326      [08-05-19 @ 06:45]              31.9     138  |  98  |  65  ----------------------------<  255      [08-06-19 @ 06:35]  3.6   |  23  |  1.87        Ca     9.9     [08-06-19 @ 06:35]    Creatinine Trend:  SCr 1.87 [08-06 @ 06:35]  SCr 1.62 [08-05 @ 06:45]  SCr 1.75 [08-04 @ 07:02]  SCr 1.64 [08-03 @ 07:08]  SCr 1.73 [08-02 @ 06:11]    Urinalysis - [07-09-19 @ 13:28]      Color Yellow / Appearance Clear / SG 1.012 / pH 6.0      Gluc Negative / Ketone Negative  / Bili Negative / Urobili Negative       Blood Trace / Protein Trace / Leuk Est Negative / Nitrite Negative      RBC 1 / WBC 1 / Hyaline 0 / Gran  / Sq Epi  / Non Sq Epi 0 / Bacteria Moderate      Iron 42, TIBC 348, %sat 12      [07-05-19 @ 22:32]  Ferritin 130      [07-05-19 @ 22:32]  .4 (Ca --)      [04-25-19 @ 05:45]   --  PTH 43.55 (Ca --)      [09-10-18 @ 07:15]   --  PTH 36.60 (Ca --)      [09-08-18 @ 03:15]   --  Vitamin D (25OH) 25.9      [05-01-19 @ 04:00]  HbA1c 7.4      [07-05-19 @ 22:32]  TSH 2.00      [07-05-19 @ 22:32]  Lipid: chol 148, , HDL 35,       [06-01-19 @ 08:00]

## 2019-08-06 NOTE — PROGRESS NOTE ADULT - SUBJECTIVE AND OBJECTIVE BOX
Endocrinology Attending Covering for Dr. Banks      Chief complaint  Patient is a 73y old  Female who presents with a chief complaint of Hypoxia, SOB (06 Aug 2019 10:07)   Review of systems  Patient in bed, looks comfortable, no fever,  had no hypoglycemia.    Labs and Fingersticks  CAPILLARY BLOOD GLUCOSE      POCT Blood Glucose.: 317 mg/dL (06 Aug 2019 07:43)  POCT Blood Glucose.: 328 mg/dL (05 Aug 2019 21:27)  POCT Blood Glucose.: 224 mg/dL (05 Aug 2019 16:46)  POCT Blood Glucose.: 247 mg/dL (05 Aug 2019 11:48)      Anion Gap, Serum: 17 (08-06 @ 06:35)  Anion Gap, Serum: 18 <H> (08-05 @ 06:45)      Calcium, Total Serum: 9.9 (08-06 @ 06:35)  Calcium, Total Serum: 9.7 (08-05 @ 06:45)          08-06    138  |  98  |  65<H>  ----------------------------<  255<H>  3.6   |  23  |  1.87<H>    Ca    9.9      06 Aug 2019 06:35                          10.3   8.2   )-----------( 326      ( 05 Aug 2019 06:45 )             31.9     Medications  MEDICATIONS  (STANDING):  acetaminophen  IVPB .. 1000 milliGRAM(s) IV Intermittent once  aspirin enteric coated 81 milliGRAM(s) Oral daily  atorvastatin 40 milliGRAM(s) Oral at bedtime  chlorhexidine 2% Cloths 1 Application(s) Topical daily  clopidogrel Tablet 75 milliGRAM(s) Oral daily  dextrose 5%. 1000 milliLiter(s) (50 mL/Hr) IV Continuous <Continuous>  dextrose 50% Injectable 12.5 Gram(s) IV Push once  dextrose 50% Injectable 25 Gram(s) IV Push once  dextrose 50% Injectable 25 Gram(s) IV Push once  docusate sodium 100 milliGRAM(s) Oral two times a day  furosemide   Injectable 60 milliGRAM(s) IV Push every 12 hours  heparin  Injectable 5000 Unit(s) SubCutaneous every 12 hours  hydrALAZINE 50 milliGRAM(s) Oral every 8 hours  insulin glargine Injectable (LANTUS) 78 Unit(s) SubCutaneous at bedtime  insulin lispro (HumaLOG) corrective regimen sliding scale   SubCutaneous three times a day before meals  insulin lispro (HumaLOG) corrective regimen sliding scale   SubCutaneous at bedtime  insulin lispro Injectable (HumaLOG) 36 Unit(s) SubCutaneous three times a day before meals  isosorbide   dinitrate Tablet (ISORDIL) 30 milliGRAM(s) Oral three times a day  levothyroxine 50 MICROGram(s) Oral daily  lidocaine   Patch 1 Patch Transdermal daily  melatonin 3 milliGRAM(s) Oral at bedtime  metoprolol succinate ER 25 milliGRAM(s) Oral daily  montelukast 10 milliGRAM(s) Oral daily  pantoprazole    Tablet 40 milliGRAM(s) Oral before breakfast  polyethylene glycol 3350 17 Gram(s) Oral daily  senna 2 Tablet(s) Oral at bedtime  spironolactone 25 milliGRAM(s) Oral daily      Physical Exam  General: Patient comfortable in bed  Vital Signs Last 12 Hrs  T(F): 98.5 (08-06-19 @ 04:23), Max: 98.5 (08-06-19 @ 04:23)  HR: 62 (08-06-19 @ 04:23) (62 - 62)  BP: 117/56 (08-06-19 @ 04:23) (117/56 - 117/56)  BP(mean): --  RR: 18 (08-06-19 @ 04:23) (18 - 18)  SpO2: 100% (08-06-19 @ 04:23) (100% - 100%)  Neck: No palpable thyroid nodules.  CVS: S1S2, No murmurs  Respiratory: No wheezing, no crepitations  GI: Abdomen soft, bowel sounds positive  Musculoskeletal:  edema lower extremities.   Skin: No skin rashes, no ecchymosis    Diagnostics

## 2019-08-06 NOTE — PROGRESS NOTE ADULT - ASSESSMENT
Pt is a 74 yo woman with PMHx of HTN, T2DM, CAD s/p CABG (LIMA to LAD, SVG to OM, SVG to PDA 2014 at [facility]), non-dilated ICM (EF 20-25%), severe mitral regurgitation s/p mitral clip (6/13/19 Dr. Newman), severe pulm HTN, CKD, hypothyroidism admitted with worsening SOB.    A/P:  MERVIN  Likely sec to cardiogenic shock  Scr 1.87 today.   Pt currently restarted on IV lasix and remains on oral Spironolactone   Continue diuretic therapy per cardiology.  Optimize glucose control  Monitor renal function   Avoid further nephrotoxics, NSAIDS, RCA    CKD stage 4:  baseline Scr 1.8-2.0. Scr stable at 1.87 today.   Renal function fluctuates sec to CHF  Monitor renal function at present    SOB:  in setting of HF. Improving on diuretic therapy.   Continue diuretics per cardiology    Acidosis   Sec to Renal failure  Improved and is currently at goal   Monitor serum Co2 at present    Hypokalemia:  in the setting of diuretic therapy. Serum potassium below goal.   Advise oral KCL 40meq x 2 today. Goal potassium of 4 (due to cardiac hx)     Anemia:   Hb stable  Pt with iron deficiency anemia.

## 2019-08-06 NOTE — PROGRESS NOTE ADULT - SUBJECTIVE AND OBJECTIVE BOX
CHIEF COMPLAINT:    SUBJECTIVE:     REVIEW OF SYSTEMS:    CONSTITUTIONAL: (  )  weakness,  (  ) fevers or chills  EYES/ENT: (  )visual changes;     NECK: (  ) pain or stiffness  RESPIRATORY:   (  )cough, wheezing, hemoptysis;  (  ) shortness of breath  CARDIOVASCULAR:  (  )chest pain or palpitations  GASTROINTESTINAL:   (  )abdominal or epigastric pain.  (  ) nausea, vomiting, or hematemesis;   (   ) diarrhea or constipation.   GENITOURINARY:   (    ) dysuria, frequency or hematuria  NEUROLOGICAL:  (   ) numbness or weakness   All other review of systems is negative unless indicated above    Vital Signs Last 24 Hrs  T(C): 36.9 (06 Aug 2019 04:23), Max: 36.9 (06 Aug 2019 04:23)  T(F): 98.5 (06 Aug 2019 04:23), Max: 98.5 (06 Aug 2019 04:23)  HR: 62 (06 Aug 2019 04:23) (60 - 75)  BP: 117/56 (06 Aug 2019 04:23) (103/61 - 134/64)  BP(mean): --  RR: 18 (06 Aug 2019 04:23) (18 - 18)  SpO2: 100% (06 Aug 2019 04:23) (100% - 100%)    I&O's Summary    05 Aug 2019 07:01  -  06 Aug 2019 07:00  --------------------------------------------------------  IN: 620 mL / OUT: 950 mL / NET: -330 mL    06 Aug 2019 07:01  -  06 Aug 2019 10:08  --------------------------------------------------------  IN: 220 mL / OUT: 0 mL / NET: 220 mL        CAPILLARY BLOOD GLUCOSE      POCT Blood Glucose.: 317 mg/dL (06 Aug 2019 07:43)  POCT Blood Glucose.: 328 mg/dL (05 Aug 2019 21:27)  POCT Blood Glucose.: 224 mg/dL (05 Aug 2019 16:46)  POCT Blood Glucose.: 247 mg/dL (05 Aug 2019 11:48)      PHYSICAL EXAM:    Constitutional:  (   ) NAD,   (   )awake and alert  HEENT: PERR, EOMI,    Neck: Soft and supple, No LAD, No JVD  Respiratory:  (    Breath sounds are clear bilaterally,    (   ) wheezing, rales or rhonchi  Cardiovascular:     (   )S1 and S2, regular rate and rhythm, no Murmurs, gallops or rubs  Gastrointestinal:  (   )Bowel Sounds present, soft,   (  )nontender, nondistended,    Extremities:    (  ) peripheral edema  Vascular: 2+ peripheral pulses  Neurological:    (    )A/O x 3,   (  ) focal deficits  Musculoskeletal:    (   )  normal strength b/l upper  (     ) normal  lower extremities  Skin: No rashes    MEDICATIONS:  MEDICATIONS  (STANDING):  acetaminophen  IVPB .. 1000 milliGRAM(s) IV Intermittent once  aspirin enteric coated 81 milliGRAM(s) Oral daily  atorvastatin 40 milliGRAM(s) Oral at bedtime  chlorhexidine 2% Cloths 1 Application(s) Topical daily  clopidogrel Tablet 75 milliGRAM(s) Oral daily  dextrose 5%. 1000 milliLiter(s) (50 mL/Hr) IV Continuous <Continuous>  dextrose 50% Injectable 12.5 Gram(s) IV Push once  dextrose 50% Injectable 25 Gram(s) IV Push once  dextrose 50% Injectable 25 Gram(s) IV Push once  docusate sodium 100 milliGRAM(s) Oral two times a day  furosemide   Injectable 60 milliGRAM(s) IV Push every 12 hours  heparin  Injectable 5000 Unit(s) SubCutaneous every 12 hours  hydrALAZINE 50 milliGRAM(s) Oral every 8 hours  insulin glargine Injectable (LANTUS) 78 Unit(s) SubCutaneous at bedtime  insulin lispro (HumaLOG) corrective regimen sliding scale   SubCutaneous three times a day before meals  insulin lispro (HumaLOG) corrective regimen sliding scale   SubCutaneous at bedtime  insulin lispro Injectable (HumaLOG) 36 Unit(s) SubCutaneous three times a day before meals  isosorbide   dinitrate Tablet (ISORDIL) 30 milliGRAM(s) Oral three times a day  levothyroxine 50 MICROGram(s) Oral daily  lidocaine   Patch 1 Patch Transdermal daily  melatonin 3 milliGRAM(s) Oral at bedtime  metoprolol succinate ER 25 milliGRAM(s) Oral daily  montelukast 10 milliGRAM(s) Oral daily  pantoprazole    Tablet 40 milliGRAM(s) Oral before breakfast  polyethylene glycol 3350 17 Gram(s) Oral daily  senna 2 Tablet(s) Oral at bedtime  spironolactone 25 milliGRAM(s) Oral daily      LABS: All Labs Reviewed:                        10.3   8.2   )-----------( 326      ( 05 Aug 2019 06:45 )             31.9     08-06    138  |  98  |  65<H>  ----------------------------<  255<H>  3.6   |  23  |  1.87<H>    Ca    9.9      06 Aug 2019 06:35            Blood Culture:   Urine Culture      RADIOLOGY/EKG:    ASSESSMENT AND PLAN:    DVT PPX:    ADVANCED DIRECTIVE:    DISPOSITION: CHIEF COMPLAINT: Patient laying in the bed awake verbal feeling fatigue and tired    SUBJECTIVE:     REVIEW OF SYSTEMS:    CONSTITUTIONAL: ( x )  weakness,  (  ) fevers or chills  EYES/ENT: (  )visual changes;     NECK: (  ) pain or stiffness  RESPIRATORY:   (  )cough, wheezing, hemoptysis;  (  ) shortness of breath  CARDIOVASCULAR:  (  )chest pain or palpitations  GASTROINTESTINAL:   (  )abdominal or epigastric pain.  (  ) nausea, vomiting, or hematemesis;   (   ) diarrhea or constipation.   GENITOURINARY:   (    ) dysuria, frequency or hematuria  NEUROLOGICAL:  (   ) numbness or weakness   All other review of systems is negative unless indicated above    Vital Signs Last 24 Hrs  T(C): 36.9 (06 Aug 2019 04:23), Max: 36.9 (06 Aug 2019 04:23)  T(F): 98.5 (06 Aug 2019 04:23), Max: 98.5 (06 Aug 2019 04:23)  HR: 62 (06 Aug 2019 04:23) (60 - 75)  BP: 117/56 (06 Aug 2019 04:23) (103/61 - 134/64)  BP(mean): --  RR: 18 (06 Aug 2019 04:23) (18 - 18)  SpO2: 100% (06 Aug 2019 04:23) (100% - 100%)    I&O's Summary    05 Aug 2019 07:01  -  06 Aug 2019 07:00  --------------------------------------------------------  IN: 620 mL / OUT: 950 mL / NET: -330 mL    06 Aug 2019 07:01  -  06 Aug 2019 10:08  --------------------------------------------------------  IN: 220 mL / OUT: 0 mL / NET: 220 mL        CAPILLARY BLOOD GLUCOSE      POCT Blood Glucose.: 317 mg/dL (06 Aug 2019 07:43)  POCT Blood Glucose.: 328 mg/dL (05 Aug 2019 21:27)  POCT Blood Glucose.: 224 mg/dL (05 Aug 2019 16:46)  POCT Blood Glucose.: 247 mg/dL (05 Aug 2019 11:48)      PHYSICAL EXAM:    Constitutional:  (  x ) NAD,   (   )awake and alert  HEENT: PERR, EOMI,    Neck: Soft and supple, No LAD, No JVD  Respiratory:  (  x  Breath sounds are clear bilaterally,    (   ) wheezing, rales or rhonchi  Cardiovascular:     ( x  )S1 and S2, regular rate and rhythm, no Murmurs, gallops or rubs  Gastrointestinal:  ( x  )Bowel Sounds present, soft,   (  )nontender, nondistended,    Extremities:    (  ) peripheral edema  Vascular: 2+ peripheral pulses  Neurological:    (  x   )A/O x 3,   (  ) focal deficits  Musculoskeletal:    (   )  normal strength b/l upper  (     ) normal  lower extremities  Skin: No rashes    MEDICATIONS:  MEDICATIONS  (STANDING):  acetaminophen  IVPB .. 1000 milliGRAM(s) IV Intermittent once  aspirin enteric coated 81 milliGRAM(s) Oral daily  atorvastatin 40 milliGRAM(s) Oral at bedtime  chlorhexidine 2% Cloths 1 Application(s) Topical daily  clopidogrel Tablet 75 milliGRAM(s) Oral daily  dextrose 5%. 1000 milliLiter(s) (50 mL/Hr) IV Continuous <Continuous>  dextrose 50% Injectable 12.5 Gram(s) IV Push once  dextrose 50% Injectable 25 Gram(s) IV Push once  dextrose 50% Injectable 25 Gram(s) IV Push once  docusate sodium 100 milliGRAM(s) Oral two times a day  furosemide   Injectable 60 milliGRAM(s) IV Push every 12 hours  heparin  Injectable 5000 Unit(s) SubCutaneous every 12 hours  hydrALAZINE 50 milliGRAM(s) Oral every 8 hours  insulin glargine Injectable (LANTUS) 78 Unit(s) SubCutaneous at bedtime  insulin lispro (HumaLOG) corrective regimen sliding scale   SubCutaneous three times a day before meals  insulin lispro (HumaLOG) corrective regimen sliding scale   SubCutaneous at bedtime  insulin lispro Injectable (HumaLOG) 36 Unit(s) SubCutaneous three times a day before meals  isosorbide   dinitrate Tablet (ISORDIL) 30 milliGRAM(s) Oral three times a day  levothyroxine 50 MICROGram(s) Oral daily  lidocaine   Patch 1 Patch Transdermal daily  melatonin 3 milliGRAM(s) Oral at bedtime  metoprolol succinate ER 25 milliGRAM(s) Oral daily  montelukast 10 milliGRAM(s) Oral daily  pantoprazole    Tablet 40 milliGRAM(s) Oral before breakfast  polyethylene glycol 3350 17 Gram(s) Oral daily  senna 2 Tablet(s) Oral at bedtime  spironolactone 25 milliGRAM(s) Oral daily      LABS: All Labs Reviewed:                        10.3   8.2   )-----------( 326      ( 05 Aug 2019 06:45 )             31.9     08-06    138  |  98  |  65<H>  ----------------------------<  255<H>  3.6   |  23  |  1.87<H>    Ca    9.9      06 Aug 2019 06:35            Blood Culture:   Urine Culture      RADIOLOGY/EKG:    ASSESSMENT AND PLAN:  74 yo woman with PMHx of HTN, T2DM (A1c 7.4%), CAD s/p CABG (LIMA to LAD, SVG to OM, SVG to PDA 2014 at [facility]), non-dilated ICM (EF 20-25%), severe mitral regurgitation s/p mitral clip (6/13/19 Dr. Newman), severe pulm HTN, CKD, hypothyroidism, who is presenting to ED after experiencing worsening SOB and [facility]. Of note, pt was recently discharged on 6/19 after having mitral clip procedure completed. She initially required BiPAP with improvement in her respiratory status following diuresis. Initiated on vasopressors and inotropes in CCU, which were subsequently weaned off. Infectious work up was negative.    #HFrEF likely 2/2 ICM with MR s/p alonso clip  - c/w Lasix 60 IV BID for today.   -     - Check daily standing weights, Is and Os, and daily lactates.   - continue hydralazine 50 mg TID and isordil 30 TID  - continue spironolactone 25 mg  - continue Metoprolol Succinate 25 mg PO Daily   - No further cardiac testing at this time.      #CAD  - continue ASA and statin   - Pt with chest pain, but also with end stage cardiomyopathy, coronary disease, had MitraClip and no further intervention feasible. She should not have trops checked.    #SVT - likely Atrial Tach vs. Atrial Flutter   - Amio initially not given due to elevated LFTs likely in the setting of congestion.   - recheck LFTs.   - Monitor on tele.   - continue metoprolol at current dose as above  - If pt has SVT again can consider amio  -Diabetes continue Lantus 78 units discussed with the endocrinology in detail her evening hyperglycemia may be secondary to food family bring from home      DVT PPX:    ADVANCED DIRECTIVE:    DISPOSITION:

## 2019-08-06 NOTE — PROGRESS NOTE ADULT - ASSESSMENT
Assessment  DMT2: 73y Female with DM T2 with hyperglycemia on insulin, blood sugars still  running High at bed time  last night 258, most likely being fed by family with food that brought in.  CAD: S/P cabg On medications, stable, monitored.  Hypothyroidism:  On synthroid 50  mcg po daily, asymptomatic.  HTN: Controlled, On med.  HLD; on statin  CKD: Monitor labs/BMP,         Plan:   DMT2:   Increase  Lantus to 78 units qhs, and    Humalog to 36  units before breakfast and Lunch and 40 units pre-dinner   in addition to correction scale coverage, monitor FS will FU  Patient counseled for compliance with consistent low carb diet.  CAD: S/P cabg On medications, stable, monitored.  Hypothyroidism:  On synthroid 50  mcg po daily, asymptomatic. F/U tsh as outpatient  HTN: Continue treatment, monitoring, Primary team FU  HLD; on statin  CKD: Continue monitoring labs, renal FU  erika Woodard MD  [phone]

## 2019-08-07 ENCOUNTER — APPOINTMENT (OUTPATIENT)
Dept: CV DIAGNOSITCS | Facility: HOSPITAL | Age: 74
End: 2019-08-07

## 2019-08-07 ENCOUNTER — APPOINTMENT (OUTPATIENT)
Dept: CARDIOTHORACIC SURGERY | Facility: CLINIC | Age: 74
End: 2019-08-07

## 2019-08-07 LAB
ALBUMIN SERPL ELPH-MCNC: 3.8 G/DL — SIGNIFICANT CHANGE UP (ref 3.3–5)
ALP SERPL-CCNC: 105 U/L — SIGNIFICANT CHANGE UP (ref 40–120)
ALT FLD-CCNC: 29 U/L — SIGNIFICANT CHANGE UP (ref 10–45)
ANION GAP SERPL CALC-SCNC: 17 MMOL/L — SIGNIFICANT CHANGE UP (ref 5–17)
AST SERPL-CCNC: 32 U/L — SIGNIFICANT CHANGE UP (ref 10–40)
BASOPHILS # BLD AUTO: 0.04 K/UL — SIGNIFICANT CHANGE UP (ref 0–0.2)
BASOPHILS NFR BLD AUTO: 0.4 % — SIGNIFICANT CHANGE UP (ref 0–2)
BILIRUB SERPL-MCNC: 0.2 MG/DL — SIGNIFICANT CHANGE UP (ref 0.2–1.2)
BUN SERPL-MCNC: 66 MG/DL — HIGH (ref 7–23)
CALCIUM SERPL-MCNC: 9.8 MG/DL — SIGNIFICANT CHANGE UP (ref 8.4–10.5)
CHLORIDE SERPL-SCNC: 99 MMOL/L — SIGNIFICANT CHANGE UP (ref 96–108)
CO2 SERPL-SCNC: 24 MMOL/L — SIGNIFICANT CHANGE UP (ref 22–31)
CREAT SERPL-MCNC: 1.81 MG/DL — HIGH (ref 0.5–1.3)
EOSINOPHIL # BLD AUTO: 0.48 K/UL — SIGNIFICANT CHANGE UP (ref 0–0.5)
EOSINOPHIL NFR BLD AUTO: 5.4 % — SIGNIFICANT CHANGE UP (ref 0–6)
GLUCOSE BLDC GLUCOMTR-MCNC: 147 MG/DL — HIGH (ref 70–99)
GLUCOSE BLDC GLUCOMTR-MCNC: 186 MG/DL — HIGH (ref 70–99)
GLUCOSE BLDC GLUCOMTR-MCNC: 189 MG/DL — HIGH (ref 70–99)
GLUCOSE BLDC GLUCOMTR-MCNC: 251 MG/DL — HIGH (ref 70–99)
GLUCOSE BLDC GLUCOMTR-MCNC: 284 MG/DL — HIGH (ref 70–99)
GLUCOSE SERPL-MCNC: 158 MG/DL — HIGH (ref 70–99)
HCT VFR BLD CALC: 29.4 % — LOW (ref 34.5–45)
HGB BLD-MCNC: 9.1 G/DL — LOW (ref 11.5–15.5)
IMM GRANULOCYTES NFR BLD AUTO: 1.6 % — HIGH (ref 0–1.5)
LYMPHOCYTES # BLD AUTO: 1.74 K/UL — SIGNIFICANT CHANGE UP (ref 1–3.3)
LYMPHOCYTES # BLD AUTO: 19.4 % — SIGNIFICANT CHANGE UP (ref 13–44)
MAGNESIUM SERPL-MCNC: 2.3 MG/DL — SIGNIFICANT CHANGE UP (ref 1.6–2.6)
MCHC RBC-ENTMCNC: 27.7 PG — SIGNIFICANT CHANGE UP (ref 27–34)
MCHC RBC-ENTMCNC: 31 GM/DL — LOW (ref 32–36)
MCV RBC AUTO: 89.4 FL — SIGNIFICANT CHANGE UP (ref 80–100)
MONOCYTES # BLD AUTO: 0.93 K/UL — HIGH (ref 0–0.9)
MONOCYTES NFR BLD AUTO: 10.4 % — SIGNIFICANT CHANGE UP (ref 2–14)
NEUTROPHILS # BLD AUTO: 5.62 K/UL — SIGNIFICANT CHANGE UP (ref 1.8–7.4)
NEUTROPHILS NFR BLD AUTO: 62.8 % — SIGNIFICANT CHANGE UP (ref 43–77)
PLATELET # BLD AUTO: 285 K/UL — SIGNIFICANT CHANGE UP (ref 150–400)
POTASSIUM SERPL-MCNC: 3.6 MMOL/L — SIGNIFICANT CHANGE UP (ref 3.5–5.3)
POTASSIUM SERPL-SCNC: 3.6 MMOL/L — SIGNIFICANT CHANGE UP (ref 3.5–5.3)
PROT SERPL-MCNC: 7.5 G/DL — SIGNIFICANT CHANGE UP (ref 6–8.3)
RBC # BLD: 3.29 M/UL — LOW (ref 3.8–5.2)
RBC # FLD: 16.1 % — HIGH (ref 10.3–14.5)
SODIUM SERPL-SCNC: 140 MMOL/L — SIGNIFICANT CHANGE UP (ref 135–145)
WBC # BLD: 8.95 K/UL — SIGNIFICANT CHANGE UP (ref 3.8–10.5)
WBC # FLD AUTO: 8.95 K/UL — SIGNIFICANT CHANGE UP (ref 3.8–10.5)

## 2019-08-07 RX ORDER — INSULIN LISPRO 100/ML
38 VIAL (ML) SUBCUTANEOUS
Refills: 0 | Status: DISCONTINUED | OUTPATIENT
Start: 2019-08-07 | End: 2019-08-11

## 2019-08-07 RX ORDER — INSULIN LISPRO 100/ML
42 VIAL (ML) SUBCUTANEOUS
Refills: 0 | Status: DISCONTINUED | OUTPATIENT
Start: 2019-08-07 | End: 2019-08-08

## 2019-08-07 RX ORDER — INSULIN GLARGINE 100 [IU]/ML
80 INJECTION, SOLUTION SUBCUTANEOUS AT BEDTIME
Refills: 0 | Status: DISCONTINUED | OUTPATIENT
Start: 2019-08-07 | End: 2019-08-08

## 2019-08-07 RX ADMIN — Medication 50 MILLIGRAM(S): at 11:17

## 2019-08-07 RX ADMIN — Medication 1: at 22:18

## 2019-08-07 RX ADMIN — ISOSORBIDE DINITRATE 30 MILLIGRAM(S): 5 TABLET ORAL at 11:17

## 2019-08-07 RX ADMIN — ISOSORBIDE DINITRATE 30 MILLIGRAM(S): 5 TABLET ORAL at 21:55

## 2019-08-07 RX ADMIN — Medication 60 MILLIGRAM(S): at 17:14

## 2019-08-07 RX ADMIN — Medication 42 UNIT(S): at 17:14

## 2019-08-07 RX ADMIN — HEPARIN SODIUM 5000 UNIT(S): 5000 INJECTION INTRAVENOUS; SUBCUTANEOUS at 17:15

## 2019-08-07 RX ADMIN — LIDOCAINE 1 PATCH: 4 CREAM TOPICAL at 21:55

## 2019-08-07 RX ADMIN — ATORVASTATIN CALCIUM 40 MILLIGRAM(S): 80 TABLET, FILM COATED ORAL at 21:54

## 2019-08-07 RX ADMIN — CLOPIDOGREL BISULFATE 75 MILLIGRAM(S): 75 TABLET, FILM COATED ORAL at 11:17

## 2019-08-07 RX ADMIN — Medication 36 UNIT(S): at 12:25

## 2019-08-07 RX ADMIN — Medication 50 MICROGRAM(S): at 05:33

## 2019-08-07 RX ADMIN — Medication 1: at 12:25

## 2019-08-07 RX ADMIN — LIDOCAINE 1 PATCH: 4 CREAM TOPICAL at 09:15

## 2019-08-07 RX ADMIN — ISOSORBIDE DINITRATE 30 MILLIGRAM(S): 5 TABLET ORAL at 05:34

## 2019-08-07 RX ADMIN — HEPARIN SODIUM 5000 UNIT(S): 5000 INJECTION INTRAVENOUS; SUBCUTANEOUS at 05:34

## 2019-08-07 RX ADMIN — INSULIN GLARGINE 80 UNIT(S): 100 INJECTION, SOLUTION SUBCUTANEOUS at 22:18

## 2019-08-07 RX ADMIN — Medication 50 MILLIGRAM(S): at 21:54

## 2019-08-07 RX ADMIN — Medication 36 UNIT(S): at 08:00

## 2019-08-07 RX ADMIN — Medication 81 MILLIGRAM(S): at 11:17

## 2019-08-07 RX ADMIN — CHLORHEXIDINE GLUCONATE 1 APPLICATION(S): 213 SOLUTION TOPICAL at 21:55

## 2019-08-07 RX ADMIN — MONTELUKAST 10 MILLIGRAM(S): 4 TABLET, CHEWABLE ORAL at 11:17

## 2019-08-07 RX ADMIN — Medication 3 MILLIGRAM(S): at 21:54

## 2019-08-07 RX ADMIN — Medication 25 MILLIGRAM(S): at 05:33

## 2019-08-07 RX ADMIN — Medication 60 MILLIGRAM(S): at 05:34

## 2019-08-07 RX ADMIN — LIDOCAINE 1 PATCH: 4 CREAM TOPICAL at 07:00

## 2019-08-07 RX ADMIN — Medication 50 MILLIGRAM(S): at 05:34

## 2019-08-07 RX ADMIN — Medication 1: at 08:00

## 2019-08-07 RX ADMIN — POLYETHYLENE GLYCOL 3350 17 GRAM(S): 17 POWDER, FOR SOLUTION ORAL at 11:17

## 2019-08-07 RX ADMIN — SPIRONOLACTONE 25 MILLIGRAM(S): 25 TABLET, FILM COATED ORAL at 05:33

## 2019-08-07 RX ADMIN — PANTOPRAZOLE SODIUM 40 MILLIGRAM(S): 20 TABLET, DELAYED RELEASE ORAL at 05:34

## 2019-08-07 RX ADMIN — Medication 100 MILLIGRAM(S): at 05:34

## 2019-08-07 NOTE — PROGRESS NOTE ADULT - SUBJECTIVE AND OBJECTIVE BOX
CHIEF COMPLAINT:    SUBJECTIVE:     REVIEW OF SYSTEMS:    CONSTITUTIONAL: (  )  weakness,  (  ) fevers or chills  EYES/ENT: (  )visual changes;     NECK: (  ) pain or stiffness  RESPIRATORY:   (  )cough, wheezing, hemoptysis;  (  ) shortness of breath  CARDIOVASCULAR:  (  )chest pain or palpitations  GASTROINTESTINAL:   (  )abdominal or epigastric pain.  (  ) nausea, vomiting, or hematemesis;   (   ) diarrhea or constipation.   GENITOURINARY:   (    ) dysuria, frequency or hematuria  NEUROLOGICAL:  (   ) numbness or weakness   All other review of systems is negative unless indicated above    Vital Signs Last 24 Hrs  T(C): 36.8 (07 Aug 2019 04:29), Max: 36.9 (06 Aug 2019 16:30)  T(F): 98.2 (07 Aug 2019 04:29), Max: 98.4 (06 Aug 2019 16:30)  HR: 63 (07 Aug 2019 04:29) (63 - 71)  BP: 109/59 (07 Aug 2019 04:29) (103/61 - 121/57)  BP(mean): --  RR: 18 (07 Aug 2019 04:29) (18 - 19)  SpO2: 100% (07 Aug 2019 04:29) (100% - 100%)    I&O's Summary    06 Aug 2019 07:01  -  07 Aug 2019 07:00  --------------------------------------------------------  IN: 660 mL / OUT: 850 mL / NET: -190 mL    07 Aug 2019 07:01  -  07 Aug 2019 09:39  --------------------------------------------------------  IN: 0 mL / OUT: 300 mL / NET: -300 mL        CAPILLARY BLOOD GLUCOSE      POCT Blood Glucose.: 189 mg/dL (07 Aug 2019 07:25)  POCT Blood Glucose.: 245 mg/dL (06 Aug 2019 21:19)  POCT Blood Glucose.: 184 mg/dL (06 Aug 2019 16:44)  POCT Blood Glucose.: 372 mg/dL (06 Aug 2019 11:45)      PHYSICAL EXAM:    Constitutional:  (   ) NAD,   (   )awake and alert  HEENT: PERR, EOMI,    Neck: Soft and supple, No LAD, No JVD  Respiratory:  (    Breath sounds are clear bilaterally,    (   ) wheezing, rales or rhonchi  Cardiovascular:     (   )S1 and S2, regular rate and rhythm, no Murmurs, gallops or rubs  Gastrointestinal:  (   )Bowel Sounds present, soft,   (  )nontender, nondistended,    Extremities:    (  ) peripheral edema  Vascular: 2+ peripheral pulses  Neurological:    (    )A/O x 3,   (  ) focal deficits  Musculoskeletal:    (   )  normal strength b/l upper  (     ) normal  lower extremities  Skin: No rashes    MEDICATIONS:  MEDICATIONS  (STANDING):  acetaminophen  IVPB .. 1000 milliGRAM(s) IV Intermittent once  aspirin enteric coated 81 milliGRAM(s) Oral daily  atorvastatin 40 milliGRAM(s) Oral at bedtime  chlorhexidine 2% Cloths 1 Application(s) Topical daily  clopidogrel Tablet 75 milliGRAM(s) Oral daily  dextrose 5%. 1000 milliLiter(s) (50 mL/Hr) IV Continuous <Continuous>  dextrose 50% Injectable 12.5 Gram(s) IV Push once  dextrose 50% Injectable 25 Gram(s) IV Push once  dextrose 50% Injectable 25 Gram(s) IV Push once  docusate sodium 100 milliGRAM(s) Oral two times a day  furosemide   Injectable 60 milliGRAM(s) IV Push every 12 hours  heparin  Injectable 5000 Unit(s) SubCutaneous every 12 hours  hydrALAZINE 50 milliGRAM(s) Oral every 8 hours  insulin glargine Injectable (LANTUS) 78 Unit(s) SubCutaneous at bedtime  insulin lispro (HumaLOG) corrective regimen sliding scale   SubCutaneous three times a day before meals  insulin lispro (HumaLOG) corrective regimen sliding scale   SubCutaneous at bedtime  insulin lispro Injectable (HumaLOG) 36 Unit(s) SubCutaneous before breakfast  insulin lispro Injectable (HumaLOG) 36 Unit(s) SubCutaneous before lunch  insulin lispro Injectable (HumaLOG) 40 Unit(s) SubCutaneous before dinner  isosorbide   dinitrate Tablet (ISORDIL) 30 milliGRAM(s) Oral three times a day  levothyroxine 50 MICROGram(s) Oral daily  lidocaine   Patch 1 Patch Transdermal daily  melatonin 3 milliGRAM(s) Oral at bedtime  metoprolol succinate ER 25 milliGRAM(s) Oral daily  montelukast 10 milliGRAM(s) Oral daily  pantoprazole    Tablet 40 milliGRAM(s) Oral before breakfast  polyethylene glycol 3350 17 Gram(s) Oral daily  senna 2 Tablet(s) Oral at bedtime  spironolactone 25 milliGRAM(s) Oral daily      LABS: All Labs Reviewed:                        9.1    8.95  )-----------( 285      ( 07 Aug 2019 08:36 )             29.4     08-07    140  |  99  |  66<H>  ----------------------------<  158<H>  3.6   |  24  |  1.81<H>    Ca    9.8      07 Aug 2019 06:03  Mg     2.3     08-07    TPro  7.5  /  Alb  3.8  /  TBili  0.2  /  DBili  x   /  AST  32  /  ALT  29  /  AlkPhos  105  08-07          Blood Culture:   Urine Culture      RADIOLOGY/EKG:    ASSESSMENT AND PLAN:    DVT PPX:    ADVANCED DIRECTIVE:    DISPOSITION: CHIEF COMPLAINT:  patient laying in the bed complain of fatigue and S OB  SUBJECTIVE:     REVIEW OF SYSTEMS:    CONSTITUTIONAL: (x  )  weakness,  (  ) fevers or chills  EYES/ENT: (  )visual changes;     NECK: (  ) pain or stiffness  RESPIRATORY:   (  )cough, wheezing, hemoptysis;  (x  ) shortness of breath  CARDIOVASCULAR:  (  )chest pain or palpitations  GASTROINTESTINAL:   (  )abdominal or epigastric pain.  (  ) nausea, vomiting, or hematemesis;   (   ) diarrhea or constipation.   GENITOURINARY:   (    ) dysuria, frequency or hematuria  NEUROLOGICAL:  (   ) numbness or weakness   All other review of systems is negative unless indicated above    Vital Signs Last 24 Hrs  T(C): 36.8 (07 Aug 2019 04:29), Max: 36.9 (06 Aug 2019 16:30)  T(F): 98.2 (07 Aug 2019 04:29), Max: 98.4 (06 Aug 2019 16:30)  HR: 63 (07 Aug 2019 04:29) (63 - 71)  BP: 109/59 (07 Aug 2019 04:29) (103/61 - 121/57)  BP(mean): --  RR: 18 (07 Aug 2019 04:29) (18 - 19)  SpO2: 100% (07 Aug 2019 04:29) (100% - 100%)    I&O's Summary    06 Aug 2019 07:01  -  07 Aug 2019 07:00  --------------------------------------------------------  IN: 660 mL / OUT: 850 mL / NET: -190 mL    07 Aug 2019 07:01  -  07 Aug 2019 09:39  --------------------------------------------------------  IN: 0 mL / OUT: 300 mL / NET: -300 mL        CAPILLARY BLOOD GLUCOSE      POCT Blood Glucose.: 189 mg/dL (07 Aug 2019 07:25)  POCT Blood Glucose.: 245 mg/dL (06 Aug 2019 21:19)  POCT Blood Glucose.: 184 mg/dL (06 Aug 2019 16:44)  POCT Blood Glucose.: 372 mg/dL (06 Aug 2019 11:45)      PHYSICAL EXAM:    Constitutional:  (  x ) NAD,   (  x )awake and alert  HEENT: PERR, EOMI,    Neck: Soft and supple, No LAD, No JVD  Respiratory:  ( x   Breath sounds are clear bilaterally,    (   ) wheezing, rales or rhonchi  Cardiovascular:     ( x  )S1 and S2, regular rate and rhythm, no Murmurs, gallops or rubs  Gastrointestinal:  (  x )Bowel Sounds present, soft,   (  )nontender, nondistended,    Extremities:    (  ) peripheral edema  Vascular: 2+ peripheral pulses  Neurological:    (  x  )A/O x 3,   (  ) focal deficits  Musculoskeletal:    ( x  )  normal strength b/l upper  (     ) normal  lower extremities  Skin: No rashes    MEDICATIONS:  MEDICATIONS  (STANDING):  acetaminophen  IVPB .. 1000 milliGRAM(s) IV Intermittent once  aspirin enteric coated 81 milliGRAM(s) Oral daily  atorvastatin 40 milliGRAM(s) Oral at bedtime  chlorhexidine 2% Cloths 1 Application(s) Topical daily  clopidogrel Tablet 75 milliGRAM(s) Oral daily  dextrose 5%. 1000 milliLiter(s) (50 mL/Hr) IV Continuous <Continuous>  dextrose 50% Injectable 12.5 Gram(s) IV Push once  dextrose 50% Injectable 25 Gram(s) IV Push once  dextrose 50% Injectable 25 Gram(s) IV Push once  docusate sodium 100 milliGRAM(s) Oral two times a day  furosemide   Injectable 60 milliGRAM(s) IV Push every 12 hours  heparin  Injectable 5000 Unit(s) SubCutaneous every 12 hours  hydrALAZINE 50 milliGRAM(s) Oral every 8 hours  insulin glargine Injectable (LANTUS) 78 Unit(s) SubCutaneous at bedtime  insulin lispro (HumaLOG) corrective regimen sliding scale   SubCutaneous three times a day before meals  insulin lispro (HumaLOG) corrective regimen sliding scale   SubCutaneous at bedtime  insulin lispro Injectable (HumaLOG) 36 Unit(s) SubCutaneous before breakfast  insulin lispro Injectable (HumaLOG) 36 Unit(s) SubCutaneous before lunch  insulin lispro Injectable (HumaLOG) 40 Unit(s) SubCutaneous before dinner  isosorbide   dinitrate Tablet (ISORDIL) 30 milliGRAM(s) Oral three times a day  levothyroxine 50 MICROGram(s) Oral daily  lidocaine   Patch 1 Patch Transdermal daily  melatonin 3 milliGRAM(s) Oral at bedtime  metoprolol succinate ER 25 milliGRAM(s) Oral daily  montelukast 10 milliGRAM(s) Oral daily  pantoprazole    Tablet 40 milliGRAM(s) Oral before breakfast  polyethylene glycol 3350 17 Gram(s) Oral daily  senna 2 Tablet(s) Oral at bedtime  spironolactone 25 milliGRAM(s) Oral daily      LABS: All Labs Reviewed:                        9.1    8.95  )-----------( 285      ( 07 Aug 2019 08:36 )             29.4     08-07    140  |  99  |  66<H>  ----------------------------<  158<H>  3.6   |  24  |  1.81<H>    Ca    9.8      07 Aug 2019 06:03  Mg     2.3     08-07    TPro  7.5  /  Alb  3.8  /  TBili  0.2  /  DBili  x   /  AST  32  /  ALT  29  /  AlkPhos  105  08-07          Blood Culture:   Urine Culture      RADIOLOGY/EKG:    ASSESSMENT AND PLAN:  72 yo woman with PMHx of HTN, T2DM (A1c 7.4%), CAD s/p CABG (LIMA to LAD, SVG to OM, SVG to PDA 2014 at [facility]), non-dilated ICM (EF 20-25%), severe mitral regurgitation s/p mitral clip (6/13/19 Dr. Newman), severe pulm HTN, CKD, hypothyroidism, who is presenting to ED after experiencing worsening SO[facility] and [facility]. Of note, pt was recently discharged on 6/19 after having mitral clip procedure completed. She initially required BiPAP with improvement in her respiratory status following diuresis. Initiated on vasopressors and inotropes in CCU, which were subsequently weaned off. Infectious work up was negative.    #HFrEF likely 2/2 ICM with MR s/p alonso clip  - c/w Lasix 60 IV BID  May change to oral Demadex as before before discharge home   - Will likely switch back to torsemide 100 mg daily tomorrow. May add metolazone a few times a week.   - Check daily standing weights, Is and Os, and daily lactates.   - continue hydralazine 50 mg TID and isordil 30 TID  - continue spironolactone 25 mg  - continue Metoprolol Succinate 25 mg PO Daily   - No further cardiac testing at this time.      #CAD  - continue ASA and statin   - Pt with chest pain, but also with end stage cardiomyopathy, coronary disease, had MitraClip and no further intervention feasible. She should not have trops checked.    #SVT - likely Atrial Tach vs. Atrial Flutter   - Amio initially not given due to elevated LFTs likely in the setting of congestion.   - recheck LFTs.   - Monitor on tele.   - continue metoprolol at current dose as above  - If pt has SVT again can consider amio   #diabetes on 78 and the Lantus	 and regular insulin 3 times a day at night she is receiving 40 units secondary to elevated hyperglycemia  DVT PPX:    ADVANCED DIRECTIVE:    DISPOSITION:

## 2019-08-07 NOTE — PROGRESS NOTE ADULT - PROBLEM SELECTOR PLAN 1
Will continue current insulin regimen for now. Will continue monitoring FS, log, will Follow up.  Patient counseled for compliance with consistent low carb diet. English

## 2019-08-07 NOTE — PROGRESS NOTE ADULT - ASSESSMENT
Assessment  DMT2: 73y Female with DM T2 with hyperglycemia on insulin, blood sugars still  running High  191  this AM .  CAD: S/P cabg On medications, stable, monitored.  Hypothyroidism:  On synthroid 50  mcg po daily, asymptomatic.  HTN: Controlled, On med.  HLD; on statin  CKD: Monitor labs/BMP,         Plan:   DMT2:   Increase  Lantus to 80 units qhs, and    Humalog to 38 units before breakfast and Lunch and 42 units pre-dinner   in addition to correction scale coverage, monitor FS will FU  Patient counseled for compliance with consistent low carb diet.  CAD: S/P cabg On medications, stable, monitored.  Hypothyroidism: tsh 2.0  On synthroid 50  mcg po daily, asymptomatic. F/U tsh as outpatient  HTN: Continue treatment, monitoring, Primary team FU  HLD; on statin  CKD: Continue monitoring labs, renal FU  Thank you for consult will F/U  erika Woodard MD  [phone]

## 2019-08-07 NOTE — PROGRESS NOTE ADULT - SUBJECTIVE AND OBJECTIVE BOX
[facility]   MD GONZALEZ SHAH D.O, PA    TELEPHONE NUMBERS:  OFFICE: [phone]  DR. SANTOYO CELL: [phone]  JUSTEN FIELD CELL: [phone]  DR. RIDER CELL:  [phone]    RENAL FOLLOW UP NOTE  --------------------------------------------------------------------------------  HPI: Pt seen and examined at bedside. Pt complains of weakness.   Denies SOB, chest pain     PAST HISTORY  --------------------------------------------------------------------------------  No significant changes to PMH, PSH, FHx, SHx, unless otherwise noted    ALLERGIES & MEDICATIONS  --------------------------------------------------------------------------------  Allergies    azithromycin (Hives; Pruritus)    Intolerances      Standing Inpatient Medications  acetaminophen  IVPB .. 1000 milliGRAM(s) IV Intermittent once  aspirin enteric coated 81 milliGRAM(s) Oral daily  atorvastatin 40 milliGRAM(s) Oral at bedtime  chlorhexidine 2% Cloths 1 Application(s) Topical daily  clopidogrel Tablet 75 milliGRAM(s) Oral daily  dextrose 5%. 1000 milliLiter(s) IV Continuous <Continuous>  dextrose 50% Injectable 12.5 Gram(s) IV Push once  dextrose 50% Injectable 25 Gram(s) IV Push once  dextrose 50% Injectable 25 Gram(s) IV Push once  docusate sodium 100 milliGRAM(s) Oral two times a day  furosemide   Injectable 60 milliGRAM(s) IV Push every 12 hours  heparin  Injectable 5000 Unit(s) SubCutaneous every 12 hours  hydrALAZINE 50 milliGRAM(s) Oral every 8 hours  insulin glargine Injectable (LANTUS) 78 Unit(s) SubCutaneous at bedtime  insulin lispro (HumaLOG) corrective regimen sliding scale   SubCutaneous three times a day before meals  insulin lispro (HumaLOG) corrective regimen sliding scale   SubCutaneous at bedtime  insulin lispro Injectable (HumaLOG) 36 Unit(s) SubCutaneous before breakfast  insulin lispro Injectable (HumaLOG) 36 Unit(s) SubCutaneous before lunch  insulin lispro Injectable (HumaLOG) 40 Unit(s) SubCutaneous before dinner  isosorbide   dinitrate Tablet (ISORDIL) 30 milliGRAM(s) Oral three times a day  levothyroxine 50 MICROGram(s) Oral daily  lidocaine   Patch 1 Patch Transdermal daily  melatonin 3 milliGRAM(s) Oral at bedtime  metoprolol succinate ER 25 milliGRAM(s) Oral daily  montelukast 10 milliGRAM(s) Oral daily  pantoprazole    Tablet 40 milliGRAM(s) Oral before breakfast  polyethylene glycol 3350 17 Gram(s) Oral daily  senna 2 Tablet(s) Oral at bedtime  spironolactone 25 milliGRAM(s) Oral daily    PRN Inpatient Medications  acetaminophen   Tablet .. 650 milliGRAM(s) Oral every 6 hours PRN  dextrose 40% Gel 15 Gram(s) Oral once PRN  glucagon  Injectable 1 milliGRAM(s) IntraMuscular once PRN      REVIEW OF SYSTEMS  --------------------------------------------------------------------------------  General: no fever  CVS: no chest pain  RESP: no sob, no cough  ABD: no abdominal pain  : no dysuria,  MSK: no edema     VITALS/PHYSICAL EXAM  --------------------------------------------------------------------------------  T(C): 36.6 (08-07-19 @ 11:33), Max: 36.9 (08-06-19 @ 16:30)  HR: 68 (08-07-19 @ 11:33) (63 - 71)  BP: 115/57 (08-07-19 @ 11:33) (104/62 - 121/57)  RR: 18 (08-07-19 @ 11:33) (18 - 19)  SpO2: 100% (08-07-19 @ 11:33) (99% - 100%)  Wt(kg): --        08-06-19 @ 07:01  -  08-07-19 @ 07:00  --------------------------------------------------------  IN: 660 mL / OUT: 850 mL / NET: -190 mL    08-07-19 @ 07:01  -  08-07-19 @ 11:57  --------------------------------------------------------  IN: 200 mL / OUT: 300 mL / NET: -100 mL      Physical Exam:  	Gen: NAD  	HEENT: MMMARY  	Pulm: Course BS B/L  	CV: S1S2  	Abd: Soft, +BS  	Ext: No LE edema B/L                      Neuro: Awake   	Skin: Warm and Dry     LABS/STUDIES  --------------------------------------------------------------------------------              9.1    8.95  >-----------<  285      [08-07-19 @ 08:36]              29.4     140  |  99  |  66  ----------------------------<  158      [08-07-19 @ 06:03]  3.6   |  24  |  1.81        Ca     9.8     [08-07-19 @ 06:03]      Mg     2.3     [08-07-19 @ 06:03]    TPro  7.5  /  Alb  3.8  /  TBili  0.2  /  DBili  x   /  AST  32  /  ALT  29  /  AlkPhos  105  [08-07-19 @ 06:03]    Creatinine Trend:  SCr 1.81 [08-07 @ 06:03]  SCr 1.87 [08-06 @ 06:35]  SCr 1.62 [08-05 @ 06:45]  SCr 1.75 [08-04 @ 07:02]  SCr 1.64 [08-03 @ 07:08]    Urinalysis - [07-09-19 @ 13:28]      Color Yellow / Appearance Clear / SG 1.012 / pH 6.0      Gluc Negative / Ketone Negative  / Bili Negative / Urobili Negative       Blood Trace / Protein Trace / Leuk Est Negative / Nitrite Negative      RBC 1 / WBC 1 / Hyaline 0 / Gran  / Sq Epi  / Non Sq Epi 0 / Bacteria Moderate      Iron 42, TIBC 348, %sat 12      [07-05-19 @ 22:32]  Ferritin 130      [07-05-19 @ 22:32]  .4 (Ca --)      [04-25-19 @ 05:45]   --  PTH 43.55 (Ca --)      [09-10-18 @ 07:15]   --  PTH 36.60 (Ca --)      [09-08-18 @ 03:15]   --  Vitamin D (25OH) 25.9      [05-01-19 @ 04:00]  HbA1c 7.4      [07-05-19 @ 22:32]  TSH 2.00      [07-05-19 @ 22:32]  Lipid: chol 148, , HDL 35,       [06-01-19 @ 08:00]

## 2019-08-07 NOTE — PROGRESS NOTE ADULT - ASSESSMENT
Pt is a 74 yo woman with PMHx of HTN, T2DM, CAD s/p CABG (LIMA to LAD, SVG to OM, SVG to PDA 2014 at [facility]), non-dilated ICM (EF 20-25%), severe mitral regurgitation s/p mitral clip (6/13/19 Dr. Newman), severe pulm HTN, CKD, hypothyroidism admitted with worsening SOB.    A/P:  MERVIN  Likely sec to cardiogenic shock  Scr 1.81 today.   Pt currently on IV lasix and remains on oral Spironolactone   Continue diuretic therapy per cardiology.  Optimize glucose control  Monitor renal function   Avoid further nephrotoxics, NSAIDS, RCA    CKD stage 4:  baseline Scr 1.8-2.0. Scr stable at 1.81 today.   Renal function fluctuates sec to CHF  Monitor renal function at present    SOB:  in setting of HF. Improving on diuretic therapy.   Continue diuretics per cardiology    Acidosis   Sec to Renal failure  Improved and is currently at goal   Monitor serum Co2 at present    Hypokalemia:  in the setting of diuretic therapy. Serum potassium below goal at 3.6.   Advise oral KCL 40meq x 2 today. Goal potassium of 4 (due to cardiac hx)     Anemia:   Hb stable  Pt with iron deficiency anemia.

## 2019-08-07 NOTE — PROGRESS NOTE ADULT - SUBJECTIVE AND OBJECTIVE BOX
Endocrinology Attending Covering for Dr. Banks      Chief complaint  Patient is a 73y old  Female who presents with a chief complaint of Hypoxia, SOB (07 Aug 2019 09:38)   Review of systems  Patient in bed, looks comfortable, no fever,  had no hypoglycemia.    Labs and Fingersticks  CAPILLARY BLOOD GLUCOSE      POCT Blood Glucose.: 189 mg/dL (07 Aug 2019 07:25)  POCT Blood Glucose.: 245 mg/dL (06 Aug 2019 21:19)  POCT Blood Glucose.: 184 mg/dL (06 Aug 2019 16:44)  POCT Blood Glucose.: 372 mg/dL (06 Aug 2019 11:45)      Anion Gap, Serum: 17 (08-07 @ 06:03)  Anion Gap, Serum: 17 (08-06 @ 06:35)      Calcium, Total Serum: 9.8 (08-07 @ 06:03)  Calcium, Total Serum: 9.9 (08-06 @ 06:35)  Albumin, Serum: 3.8 (08-07 @ 06:03)    Alanine Aminotransferase (ALT/SGPT): 29 (08-07 @ 06:03)  Alkaline Phosphatase, Serum: 105 (08-07 @ 06:03)  Aspartate Aminotransferase (AST/SGOT): 32 (08-07 @ 06:03)        08-07    140  |  99  |  66<H>  ----------------------------<  158<H>  3.6   |  24  |  1.81<H>    Ca    9.8      07 Aug 2019 06:03  Mg     2.3     08-07    TPro  7.5  /  Alb  3.8  /  TBili  0.2  /  DBili  x   /  AST  32  /  ALT  29  /  AlkPhos  105  08-07                        9.1    8.95  )-----------( 285      ( 07 Aug 2019 08:36 )             29.4     Medications  MEDICATIONS  (STANDING):  acetaminophen  IVPB .. 1000 milliGRAM(s) IV Intermittent once  aspirin enteric coated 81 milliGRAM(s) Oral daily  atorvastatin 40 milliGRAM(s) Oral at bedtime  chlorhexidine 2% Cloths 1 Application(s) Topical daily  clopidogrel Tablet 75 milliGRAM(s) Oral daily  dextrose 5%. 1000 milliLiter(s) (50 mL/Hr) IV Continuous <Continuous>  dextrose 50% Injectable 12.5 Gram(s) IV Push once  dextrose 50% Injectable 25 Gram(s) IV Push once  dextrose 50% Injectable 25 Gram(s) IV Push once  docusate sodium 100 milliGRAM(s) Oral two times a day  furosemide   Injectable 60 milliGRAM(s) IV Push every 12 hours  heparin  Injectable 5000 Unit(s) SubCutaneous every 12 hours  hydrALAZINE 50 milliGRAM(s) Oral every 8 hours  insulin glargine Injectable (LANTUS) 78 Unit(s) SubCutaneous at bedtime  insulin lispro (HumaLOG) corrective regimen sliding scale   SubCutaneous three times a day before meals  insulin lispro (HumaLOG) corrective regimen sliding scale   SubCutaneous at bedtime  insulin lispro Injectable (HumaLOG) 36 Unit(s) SubCutaneous before breakfast  insulin lispro Injectable (HumaLOG) 36 Unit(s) SubCutaneous before lunch  insulin lispro Injectable (HumaLOG) 40 Unit(s) SubCutaneous before dinner  isosorbide   dinitrate Tablet (ISORDIL) 30 milliGRAM(s) Oral three times a day  levothyroxine 50 MICROGram(s) Oral daily  lidocaine   Patch 1 Patch Transdermal daily  melatonin 3 milliGRAM(s) Oral at bedtime  metoprolol succinate ER 25 milliGRAM(s) Oral daily  montelukast 10 milliGRAM(s) Oral daily  pantoprazole    Tablet 40 milliGRAM(s) Oral before breakfast  polyethylene glycol 3350 17 Gram(s) Oral daily  senna 2 Tablet(s) Oral at bedtime  spironolactone 25 milliGRAM(s) Oral daily      Physical Exam  General: Patient comfortable in bed  Vital Signs Last 12 Hrs  T(F): 98.4 (08-07-19 @ 10:12), Max: 98.4 (08-07-19 @ 10:12)  HR: 67 (08-07-19 @ 10:12) (63 - 67)  BP: 107/65 (08-07-19 @ 10:12) (107/65 - 109/59)  BP(mean): --  RR: 18 (08-07-19 @ 10:12) (18 - 18)  SpO2: 99% (08-07-19 @ 10:12) (99% - 100%)  Neck: No palpable thyroid nodules.  CVS: S1S2, No murmurs  Respiratory: No wheezing, no crepitations  GI: Abdomen soft, bowel sounds positive  Musculoskeletal:  edema lower extremities.   Skin: No skin rashes, no ecchymosis    Diagnostics

## 2019-08-07 NOTE — CHART NOTE - NSCHARTNOTEFT_GEN_A_CORE
73 year old female PMH HTN, DM Type 2, CAD s/p CABG (LIMA to LAD, SVG to OM, SVG to PDA 2014 at [facility]), non-dilated ICM (EF 20-25%), severe mitral regurgitation s/p mitral clip, severe pulm HTN, CKD, hypothyroidism admitted with acute on chronic systolic CHF with respiratory failure.  Patient has on going issues with deconditioning and generalized weakness secondary to the diagnoses listed above.  Patient will require a semi-electric hospital bed.  This is necessary to achieve positioning and elevation of 30 degrees or more most of the time secondary to congestive heart failure, which is unable to be obtained on an ordinary bed.  Bed pillows and wedges have been tried and ruled out.  This patient requires a gel mattress overlay to prevent pressure ulcers.

## 2019-08-08 LAB
ANION GAP SERPL CALC-SCNC: 17 MMOL/L — SIGNIFICANT CHANGE UP (ref 5–17)
BUN SERPL-MCNC: 66 MG/DL — HIGH (ref 7–23)
CALCIUM SERPL-MCNC: 9.7 MG/DL — SIGNIFICANT CHANGE UP (ref 8.4–10.5)
CHLORIDE SERPL-SCNC: 98 MMOL/L — SIGNIFICANT CHANGE UP (ref 96–108)
CO2 SERPL-SCNC: 23 MMOL/L — SIGNIFICANT CHANGE UP (ref 22–31)
CREAT SERPL-MCNC: 1.74 MG/DL — HIGH (ref 0.5–1.3)
GLUCOSE BLDC GLUCOMTR-MCNC: 101 MG/DL — HIGH (ref 70–99)
GLUCOSE BLDC GLUCOMTR-MCNC: 107 MG/DL — HIGH (ref 70–99)
GLUCOSE BLDC GLUCOMTR-MCNC: 142 MG/DL — HIGH (ref 70–99)
GLUCOSE BLDC GLUCOMTR-MCNC: 238 MG/DL — HIGH (ref 70–99)
GLUCOSE BLDC GLUCOMTR-MCNC: 241 MG/DL — HIGH (ref 70–99)
GLUCOSE BLDC GLUCOMTR-MCNC: 309 MG/DL — HIGH (ref 70–99)
GLUCOSE SERPL-MCNC: 212 MG/DL — HIGH (ref 70–99)
HCT VFR BLD CALC: 28.1 % — LOW (ref 34.5–45)
HCT VFR BLD CALC: 29.4 % — LOW (ref 34.5–45)
HGB BLD-MCNC: 9.1 G/DL — LOW (ref 11.5–15.5)
HGB BLD-MCNC: 9.4 G/DL — LOW (ref 11.5–15.5)
MAGNESIUM SERPL-MCNC: 2.3 MG/DL — SIGNIFICANT CHANGE UP (ref 1.6–2.6)
MCHC RBC-ENTMCNC: 28 PG — SIGNIFICANT CHANGE UP (ref 27–34)
MCHC RBC-ENTMCNC: 28.7 PG — SIGNIFICANT CHANGE UP (ref 27–34)
MCHC RBC-ENTMCNC: 31 GM/DL — LOW (ref 32–36)
MCHC RBC-ENTMCNC: 33.4 GM/DL — SIGNIFICANT CHANGE UP (ref 32–36)
MCV RBC AUTO: 85.8 FL — SIGNIFICANT CHANGE UP (ref 80–100)
MCV RBC AUTO: 90.5 FL — SIGNIFICANT CHANGE UP (ref 80–100)
PLATELET # BLD AUTO: 275 K/UL — SIGNIFICANT CHANGE UP (ref 150–400)
PLATELET # BLD AUTO: 315 K/UL — SIGNIFICANT CHANGE UP (ref 150–400)
POTASSIUM SERPL-MCNC: 3.7 MMOL/L — SIGNIFICANT CHANGE UP (ref 3.5–5.3)
POTASSIUM SERPL-SCNC: 3.7 MMOL/L — SIGNIFICANT CHANGE UP (ref 3.5–5.3)
RBC # BLD: 3.25 M/UL — LOW (ref 3.8–5.2)
RBC # BLD: 3.27 M/UL — LOW (ref 3.8–5.2)
RBC # FLD: 16.1 % — HIGH (ref 10.3–14.5)
RBC # FLD: 16.2 % — HIGH (ref 10.3–14.5)
SODIUM SERPL-SCNC: 138 MMOL/L — SIGNIFICANT CHANGE UP (ref 135–145)
WBC # BLD: 8.04 K/UL — SIGNIFICANT CHANGE UP (ref 3.8–10.5)
WBC # BLD: 9.5 K/UL — SIGNIFICANT CHANGE UP (ref 3.8–10.5)
WBC # FLD AUTO: 8.04 K/UL — SIGNIFICANT CHANGE UP (ref 3.8–10.5)
WBC # FLD AUTO: 9.5 K/UL — SIGNIFICANT CHANGE UP (ref 3.8–10.5)

## 2019-08-08 RX ORDER — POTASSIUM CHLORIDE 20 MEQ
40 PACKET (EA) ORAL ONCE
Refills: 0 | Status: COMPLETED | OUTPATIENT
Start: 2019-08-08 | End: 2019-08-08

## 2019-08-08 RX ORDER — INSULIN LISPRO 100/ML
46 VIAL (ML) SUBCUTANEOUS
Refills: 0 | Status: DISCONTINUED | OUTPATIENT
Start: 2019-08-08 | End: 2019-08-12

## 2019-08-08 RX ORDER — INSULIN GLARGINE 100 [IU]/ML
84 INJECTION, SOLUTION SUBCUTANEOUS AT BEDTIME
Refills: 0 | Status: DISCONTINUED | OUTPATIENT
Start: 2019-08-08 | End: 2019-08-11

## 2019-08-08 RX ORDER — INSULIN GLARGINE 100 [IU]/ML
50 INJECTION, SOLUTION SUBCUTANEOUS ONCE
Refills: 0 | Status: DISCONTINUED | OUTPATIENT
Start: 2019-08-08 | End: 2019-08-08

## 2019-08-08 RX ORDER — OXYCODONE HYDROCHLORIDE 5 MG/1
5 TABLET ORAL ONCE
Refills: 0 | Status: DISCONTINUED | OUTPATIENT
Start: 2019-08-08 | End: 2019-08-08

## 2019-08-08 RX ADMIN — OXYCODONE HYDROCHLORIDE 5 MILLIGRAM(S): 5 TABLET ORAL at 22:21

## 2019-08-08 RX ADMIN — Medication 2: at 09:09

## 2019-08-08 RX ADMIN — LIDOCAINE 1 PATCH: 4 CREAM TOPICAL at 09:00

## 2019-08-08 RX ADMIN — Medication 40 MILLIEQUIVALENT(S): at 17:13

## 2019-08-08 RX ADMIN — LIDOCAINE 1 PATCH: 4 CREAM TOPICAL at 22:09

## 2019-08-08 RX ADMIN — Medication 50 MICROGRAM(S): at 05:46

## 2019-08-08 RX ADMIN — PANTOPRAZOLE SODIUM 40 MILLIGRAM(S): 20 TABLET, DELAYED RELEASE ORAL at 05:46

## 2019-08-08 RX ADMIN — Medication 60 MILLIGRAM(S): at 17:14

## 2019-08-08 RX ADMIN — Medication 60 MILLIGRAM(S): at 05:46

## 2019-08-08 RX ADMIN — MONTELUKAST 10 MILLIGRAM(S): 4 TABLET, CHEWABLE ORAL at 12:51

## 2019-08-08 RX ADMIN — LIDOCAINE 1 PATCH: 4 CREAM TOPICAL at 07:15

## 2019-08-08 RX ADMIN — Medication 650 MILLIGRAM(S): at 10:00

## 2019-08-08 RX ADMIN — Medication 3 MILLIGRAM(S): at 22:08

## 2019-08-08 RX ADMIN — Medication 50 MILLIGRAM(S): at 22:08

## 2019-08-08 RX ADMIN — Medication 1 TABLET(S): at 14:20

## 2019-08-08 RX ADMIN — OXYCODONE HYDROCHLORIDE 5 MILLIGRAM(S): 5 TABLET ORAL at 12:24

## 2019-08-08 RX ADMIN — Medication 46 UNIT(S): at 17:13

## 2019-08-08 RX ADMIN — Medication 4: at 12:24

## 2019-08-08 RX ADMIN — Medication 650 MILLIGRAM(S): at 23:33

## 2019-08-08 RX ADMIN — ISOSORBIDE DINITRATE 30 MILLIGRAM(S): 5 TABLET ORAL at 22:08

## 2019-08-08 RX ADMIN — POLYETHYLENE GLYCOL 3350 17 GRAM(S): 17 POWDER, FOR SOLUTION ORAL at 12:51

## 2019-08-08 RX ADMIN — OXYCODONE HYDROCHLORIDE 5 MILLIGRAM(S): 5 TABLET ORAL at 13:24

## 2019-08-08 RX ADMIN — Medication 650 MILLIGRAM(S): at 08:59

## 2019-08-08 RX ADMIN — CHLORHEXIDINE GLUCONATE 1 APPLICATION(S): 213 SOLUTION TOPICAL at 22:09

## 2019-08-08 RX ADMIN — Medication 38 UNIT(S): at 12:25

## 2019-08-08 RX ADMIN — Medication 25 MILLIGRAM(S): at 05:46

## 2019-08-08 RX ADMIN — Medication 50 MILLIGRAM(S): at 14:20

## 2019-08-08 RX ADMIN — Medication 50 MILLIGRAM(S): at 05:45

## 2019-08-08 RX ADMIN — Medication 38 UNIT(S): at 10:00

## 2019-08-08 RX ADMIN — SPIRONOLACTONE 25 MILLIGRAM(S): 25 TABLET, FILM COATED ORAL at 05:45

## 2019-08-08 RX ADMIN — Medication 100 MILLIGRAM(S): at 17:13

## 2019-08-08 RX ADMIN — CLOPIDOGREL BISULFATE 75 MILLIGRAM(S): 75 TABLET, FILM COATED ORAL at 12:51

## 2019-08-08 RX ADMIN — ISOSORBIDE DINITRATE 30 MILLIGRAM(S): 5 TABLET ORAL at 14:20

## 2019-08-08 RX ADMIN — INSULIN GLARGINE 84 UNIT(S): 100 INJECTION, SOLUTION SUBCUTANEOUS at 23:48

## 2019-08-08 RX ADMIN — OXYCODONE HYDROCHLORIDE 5 MILLIGRAM(S): 5 TABLET ORAL at 20:48

## 2019-08-08 RX ADMIN — ISOSORBIDE DINITRATE 30 MILLIGRAM(S): 5 TABLET ORAL at 05:46

## 2019-08-08 RX ADMIN — ATORVASTATIN CALCIUM 40 MILLIGRAM(S): 80 TABLET, FILM COATED ORAL at 22:08

## 2019-08-08 RX ADMIN — Medication 81 MILLIGRAM(S): at 12:51

## 2019-08-08 NOTE — PROGRESS NOTE ADULT - ASSESSMENT
Assessment  DMT2: 73y Female with DM T2 with hyperglycemia on insulin, blood sugars still  running High  238  this AM .  CAD: S/P cabg On medications, stable, monitored.  Hypothyroidism:  On synthroid 50  mcg po daily, asymptomatic.  HTN: Controlled, On med.  HLD; on statin  CKD: Monitor labs/BMP,         Plan:   DMT2:   Increase  Lantus to 84 units qhs, and    Humalog to 38 units before breakfast and Lunch and 46 units pre-dinner   in addition to correction scale coverage, monitor FS will FU  Patient counseled for compliance with consistent low carb diet.  CAD: S/P cabg On medications, stable, monitored.  Hypothyroidism: tsh 2.0  On synthroid 50  mcg po daily, asymptomatic. F/U tsh as outpatient  HTN: Continue treatment, monitoring, Primary team FU  HLD; on statin  CKD: Continue monitoring labs, renal FU  Thank you for consult will F/U  erika Woodard MD  [phone]

## 2019-08-08 NOTE — PROGRESS NOTE ADULT - ASSESSMENT
Pt is a 74 yo woman with PMHx of HTN, T2DM, CAD s/p CABG (LIMA to LAD, SVG to OM, SVG to PDA 2014 at [facility]), non-dilated ICM (EF 20-25%), severe mitral regurgitation s/p mitral clip (6/13/19 Dr. Newman), severe pulm HTN, CKD, hypothyroidism admitted with worsening SOB.    A/P:  MERVIN  Likely sec to cardiogenic shock  Scr 1.74 today.   Pt currently on lasix 60 IV BID and remains on oral Spironolactone   Continue diuretic therapy per cardiology.  Optimize glucose control  Monitor renal function   Avoid further nephrotoxics, NSAIDS, RCA    CKD stage 4:  baseline Scr 1.8-2.0. Scr stable at 1.74 today.   Renal function fluctuates sec to CHF  Monitor renal function at present    SOB:  in setting of HF. Improving on diuretic therapy.   Continue diuretics per cardiology    Acidosis   Sec to Renal failure  Improved and is currently at goal   Monitor serum Co2 at present    Hypokalemia:  in the setting of diuretic therapy. Serum potassium below goal at 3.7.   Advise oral KCL 40meq x 1 today. Goal potassium of 4 (due to cardiac hx)     Anemia:   Hb stable  Pt with iron deficiency anemia.

## 2019-08-08 NOTE — CHART NOTE - NSCHARTNOTEFT_GEN_A_CORE
Patient is a 73y old  Female who presents with a chief complaint of Hypoxia, SOB (07 Aug 2019 11:57)      Called by RN to evaluate patient for abdominal bleeding s/p Heparin sq. Seen and examined at the bedside. Patient states "I have been bleeding since this afternoon"    Vital Signs Last 24 Hrs  T(C): 36.8 (08 Aug 2019 04:45), Max: 36.9 (07 Aug 2019 10:12)  T(F): 98.2 (08 Aug 2019 04:45), Max: 98.4 (07 Aug 2019 10:12)  HR: 68 (08 Aug 2019 04:45) (64 - 73)  BP: 104/60 (08 Aug 2019 04:45) (104/60 - 118/55)  BP(mean): --  RR: 18 (08 Aug 2019 04:45) (18 - 18)  SpO2: 99% (08 Aug 2019 04:45) (99% - 100%)                          9.4    9.5   )-----------( 315      ( 07 Aug 2019 23:41 )             28.1     08-07    140  |  99  |  66<H>  ----------------------------<  158<H>  3.6   |  24  |  1.81<H>    Ca    9.8      07 Aug 2019 06:03  Mg     2.3     08-07    TPro  7.5  /  Alb  3.8  /  TBili  0.2  /  DBili  x   /  AST  32  /  ALT  29  /  AlkPhos  105  08-07        PHYSICAL EXAM:  GENERAL: NAD, well-developed  HEAD:  Atraumatic, Normocephalic  EYES: EOMI, PERRLA, conjunctiva and sclera clear  NECK: Supple, No JVD  CHEST/LUNG: Clear to auscultation bilaterally; No wheeze  HEART: Regular rate and rhythm; No murmurs, rubs, or gallops  ABDOMEN: Soft, tender at the side, Nondistended; Bowel sounds present  EXTREMITIES:  2+ Peripheral Pulses, No clubbing, cyanosis, or edema  PSYCH: AAOx3  NEUROLOGY: non-focal  SKIN: No rashes or lesions    Assessment/Plan    Right lower quadrant abd bleeding  Pressure applied for over 20 minutes with no relief  pressure dressing/quickclot applied  CBC  Abdominal doppler ordered  F/U        Phone: [phone]

## 2019-08-08 NOTE — PROGRESS NOTE ADULT - SUBJECTIVE AND OBJECTIVE BOX
Endocrinology Attending Covering for Dr. Banks      Chief complaint  Patient is a 73y old  Female who presents with a chief complaint of Hypoxia, SOB (08 Aug 2019 09:03)   Review of systems  Patient in bed, looks comfortable, no fever,  had no hypoglycemia.    Labs and Fingersticks  CAPILLARY BLOOD GLUCOSE      POCT Blood Glucose.: 238 mg/dL (08 Aug 2019 09:06)  POCT Blood Glucose.: 284 mg/dL (07 Aug 2019 22:13)  POCT Blood Glucose.: 251 mg/dL (07 Aug 2019 21:06)  POCT Blood Glucose.: 147 mg/dL (07 Aug 2019 16:22)  POCT Blood Glucose.: 186 mg/dL (07 Aug 2019 11:32)      Anion Gap, Serum: 17 (08-08 @ 06:11)  Anion Gap, Serum: 17 (08-07 @ 06:03)      Calcium, Total Serum: 9.7 (08-08 @ 06:11)  Calcium, Total Serum: 9.8 (08-07 @ 06:03)  Albumin, Serum: 3.8 (08-07 @ 06:03)    Alanine Aminotransferase (ALT/SGPT): 29 (08-07 @ 06:03)  Alkaline Phosphatase, Serum: 105 (08-07 @ 06:03)  Aspartate Aminotransferase (AST/SGOT): 32 (08-07 @ 06:03)        08-08    138  |  98  |  66<H>  ----------------------------<  212<H>  3.7   |  23  |  1.74<H>    Ca    9.7      08 Aug 2019 06:11  Mg     2.3     08-08    TPro  7.5  /  Alb  3.8  /  TBili  0.2  /  DBili  x   /  AST  32  /  ALT  29  /  AlkPhos  105  08-07                        9.1    8.04  )-----------( 275      ( 08 Aug 2019 08:34 )             29.4     Medications  MEDICATIONS  (STANDING):  acetaminophen  IVPB .. 1000 milliGRAM(s) IV Intermittent once  aspirin enteric coated 81 milliGRAM(s) Oral daily  atorvastatin 40 milliGRAM(s) Oral at bedtime  chlorhexidine 2% Cloths 1 Application(s) Topical daily  clopidogrel Tablet 75 milliGRAM(s) Oral daily  dextrose 5%. 1000 milliLiter(s) (50 mL/Hr) IV Continuous <Continuous>  dextrose 50% Injectable 12.5 Gram(s) IV Push once  dextrose 50% Injectable 25 Gram(s) IV Push once  dextrose 50% Injectable 25 Gram(s) IV Push once  docusate sodium 100 milliGRAM(s) Oral two times a day  furosemide   Injectable 60 milliGRAM(s) IV Push every 12 hours  heparin  Injectable 5000 Unit(s) SubCutaneous every 12 hours  hydrALAZINE 50 milliGRAM(s) Oral every 8 hours  insulin glargine Injectable (LANTUS) 80 Unit(s) SubCutaneous at bedtime  insulin lispro (HumaLOG) corrective regimen sliding scale   SubCutaneous three times a day before meals  insulin lispro (HumaLOG) corrective regimen sliding scale   SubCutaneous at bedtime  insulin lispro Injectable (HumaLOG) 38 Unit(s) SubCutaneous before breakfast  insulin lispro Injectable (HumaLOG) 38 Unit(s) SubCutaneous before lunch  insulin lispro Injectable (HumaLOG) 42 Unit(s) SubCutaneous before dinner  isosorbide   dinitrate Tablet (ISORDIL) 30 milliGRAM(s) Oral three times a day  levothyroxine 50 MICROGram(s) Oral daily  lidocaine   Patch 1 Patch Transdermal daily  melatonin 3 milliGRAM(s) Oral at bedtime  metoprolol succinate ER 25 milliGRAM(s) Oral daily  montelukast 10 milliGRAM(s) Oral daily  pantoprazole    Tablet 40 milliGRAM(s) Oral before breakfast  polyethylene glycol 3350 17 Gram(s) Oral daily  senna 2 Tablet(s) Oral at bedtime  spironolactone 25 milliGRAM(s) Oral daily      Physical Exam  General: Patient comfortable in bed  Vital Signs Last 12 Hrs  T(F): 98.2 (08-08-19 @ 04:45), Max: 98.2 (08-08-19 @ 04:45)  HR: 68 (08-08-19 @ 04:45) (68 - 68)  BP: 104/60 (08-08-19 @ 04:45) (104/60 - 104/60)  BP(mean): --  RR: 18 (08-08-19 @ 04:45) (18 - 18)  SpO2: 99% (08-08-19 @ 04:45) (99% - 99%)  Neck: No palpable thyroid nodules.  CVS: S1S2, No murmurs  Respiratory: No wheezing, no crepitations  GI: Abdomen soft, bowel sounds positive  Musculoskeletal:  edema lower extremities.   Skin: No skin rashes, no ecchymosis    Diagnostics

## 2019-08-08 NOTE — CHART NOTE - NSCHARTNOTEFT_GEN_A_CORE
NP note -  medical device necessity     Ms. Nuzhat Gamino with past medical history of ischemic cardiomyopathy, severe pulmonary hypertension and CHF,  requires a 1 in 1 commode and transport wheelchair. Ms. Gamino requires assistance to transfer to commode and also to be transported to her doctors' offices as she is unable to ambulate long distances. this patient's medica;   condition necessitates this level of care at home with above medical equipment based on patient safety and health needs.     NP. Cole Bravo  [id number] NP note -  medical device necessity     Ms. Nuzhat Gamino with past medical history of ischemic cardiomyopathy, severe pulmonary hypertension and CHF,  requires a 3 in 1 commode and transport wheelchair. Ms. Gamino requires assistance to transfer to commode and also to be transported to her doctors' offices as she is unable to ambulate long distances. this patient's medical  condition necessitates this level of care at home with above medical equipment based on patient safety and health needs.     NP. Cole Bravo  [id number]

## 2019-08-08 NOTE — PROGRESS NOTE ADULT - SUBJECTIVE AND OBJECTIVE BOX
CHIEF COMPLAINT:    SUBJECTIVE:     REVIEW OF SYSTEMS:    CONSTITUTIONAL: (  )  weakness,  (  ) fevers or chills  EYES/ENT: (  )visual changes;     NECK: (  ) pain or stiffness  RESPIRATORY:   (  )cough, wheezing, hemoptysis;  (  ) shortness of breath  CARDIOVASCULAR:  (  )chest pain or palpitations  GASTROINTESTINAL:   (  )abdominal or epigastric pain.  (  ) nausea, vomiting, or hematemesis;   (   ) diarrhea or constipation.   GENITOURINARY:   (    ) dysuria, frequency or hematuria  NEUROLOGICAL:  (   ) numbness or weakness   All other review of systems is negative unless indicated above    Vital Signs Last 24 Hrs  T(C): 36.8 (08 Aug 2019 04:45), Max: 36.9 (07 Aug 2019 10:12)  T(F): 98.2 (08 Aug 2019 04:45), Max: 98.4 (07 Aug 2019 10:12)  HR: 68 (08 Aug 2019 04:45) (64 - 73)  BP: 104/60 (08 Aug 2019 04:45) (104/60 - 118/55)  BP(mean): --  RR: 18 (08 Aug 2019 04:45) (18 - 18)  SpO2: 99% (08 Aug 2019 04:45) (99% - 100%)    I&O's Summary    07 Aug 2019 07:01  -  08 Aug 2019 07:00  --------------------------------------------------------  IN: 580 mL / OUT: 950 mL / NET: -370 mL        CAPILLARY BLOOD GLUCOSE      POCT Blood Glucose.: 284 mg/dL (07 Aug 2019 22:13)  POCT Blood Glucose.: 251 mg/dL (07 Aug 2019 21:06)  POCT Blood Glucose.: 147 mg/dL (07 Aug 2019 16:22)  POCT Blood Glucose.: 186 mg/dL (07 Aug 2019 11:32)      PHYSICAL EXAM:    Constitutional:  (   ) NAD,   (   )awake and alert  HEENT: PERR, EOMI,    Neck: Soft and supple, No LAD, No JVD  Respiratory:  (    Breath sounds are clear bilaterally,    (   ) wheezing, rales or rhonchi  Cardiovascular:     (   )S1 and S2, regular rate and rhythm, no Murmurs, gallops or rubs  Gastrointestinal:  (   )Bowel Sounds present, soft,   (  )nontender, nondistended,    Extremities:    (  ) peripheral edema  Vascular: 2+ peripheral pulses  Neurological:    (    )A/O x 3,   (  ) focal deficits  Musculoskeletal:    (   )  normal strength b/l upper  (     ) normal  lower extremities  Skin: No rashes    MEDICATIONS:  MEDICATIONS  (STANDING):  acetaminophen  IVPB .. 1000 milliGRAM(s) IV Intermittent once  aspirin enteric coated 81 milliGRAM(s) Oral daily  atorvastatin 40 milliGRAM(s) Oral at bedtime  chlorhexidine 2% Cloths 1 Application(s) Topical daily  clopidogrel Tablet 75 milliGRAM(s) Oral daily  dextrose 5%. 1000 milliLiter(s) (50 mL/Hr) IV Continuous <Continuous>  dextrose 50% Injectable 12.5 Gram(s) IV Push once  dextrose 50% Injectable 25 Gram(s) IV Push once  dextrose 50% Injectable 25 Gram(s) IV Push once  docusate sodium 100 milliGRAM(s) Oral two times a day  furosemide   Injectable 60 milliGRAM(s) IV Push every 12 hours  heparin  Injectable 5000 Unit(s) SubCutaneous every 12 hours  hydrALAZINE 50 milliGRAM(s) Oral every 8 hours  insulin glargine Injectable (LANTUS) 80 Unit(s) SubCutaneous at bedtime  insulin lispro (HumaLOG) corrective regimen sliding scale   SubCutaneous three times a day before meals  insulin lispro (HumaLOG) corrective regimen sliding scale   SubCutaneous at bedtime  insulin lispro Injectable (HumaLOG) 38 Unit(s) SubCutaneous before breakfast  insulin lispro Injectable (HumaLOG) 38 Unit(s) SubCutaneous before lunch  insulin lispro Injectable (HumaLOG) 42 Unit(s) SubCutaneous before dinner  isosorbide   dinitrate Tablet (ISORDIL) 30 milliGRAM(s) Oral three times a day  levothyroxine 50 MICROGram(s) Oral daily  lidocaine   Patch 1 Patch Transdermal daily  melatonin 3 milliGRAM(s) Oral at bedtime  metoprolol succinate ER 25 milliGRAM(s) Oral daily  montelukast 10 milliGRAM(s) Oral daily  pantoprazole    Tablet 40 milliGRAM(s) Oral before breakfast  polyethylene glycol 3350 17 Gram(s) Oral daily  senna 2 Tablet(s) Oral at bedtime  spironolactone 25 milliGRAM(s) Oral daily      LABS: All Labs Reviewed:                        9.1    8.04  )-----------( 275      ( 08 Aug 2019 08:34 )             29.4     08-08    138  |  98  |  66<H>  ----------------------------<  212<H>  3.7   |  23  |  1.74<H>    Ca    9.7      08 Aug 2019 06:11  Mg     2.3     08-08    TPro  7.5  /  Alb  3.8  /  TBili  0.2  /  DBili  x   /  AST  32  /  ALT  29  /  AlkPhos  105  08-07          Blood Culture:   Urine Culture      RADIOLOGY/EKG:    ASSESSMENT AND PLAN:    DVT PPX:    ADVANCED DIRECTIVE:    DISPOSITION: CHIEF COMPLAINT:  patient complaint of not feeling well the event of bleeding at the right lower abdomen wall secondary to heparin noted  SUBJECTIVE:     REVIEW OF SYSTEMS:    CONSTITUTIONAL: (x  )  weakness,  (  ) fevers or chills  EYES/ENT: (  )visual changes;     NECK: (  ) pain or stiffness  RESPIRATORY:   (  )cough, wheezing, hemoptysis;  (  ) shortness of breath  CARDIOVASCULAR:  (  )chest pain or palpitations  GASTROINTESTINAL:   (  )abdominal or epigastric pain.  (  ) nausea, vomiting, or hematemesis;   (   ) diarrhea or constipation.   GENITOURINARY:   (    ) dysuria, frequency or hematuria  NEUROLOGICAL:  (   ) numbness or weakness   All other review of systems is negative unless indicated above    Vital Signs Last 24 Hrs  T(C): 36.8 (08 Aug 2019 04:45), Max: 36.9 (07 Aug 2019 10:12)  T(F): 98.2 (08 Aug 2019 04:45), Max: 98.4 (07 Aug 2019 10:12)  HR: 68 (08 Aug 2019 04:45) (64 - 73)  BP: 104/60 (08 Aug 2019 04:45) (104/60 - 118/55)  BP(mean): --  RR: 18 (08 Aug 2019 04:45) (18 - 18)  SpO2: 99% (08 Aug 2019 04:45) (99% - 100%)    I&O's Summary    07 Aug 2019 07:01  -  08 Aug 2019 07:00  --------------------------------------------------------  IN: 580 mL / OUT: 950 mL / NET: -370 mL        CAPILLARY BLOOD GLUCOSE      POCT Blood Glucose.: 284 mg/dL (07 Aug 2019 22:13)  POCT Blood Glucose.: 251 mg/dL (07 Aug 2019 21:06)  POCT Blood Glucose.: 147 mg/dL (07 Aug 2019 16:22)  POCT Blood Glucose.: 186 mg/dL (07 Aug 2019 11:32)      PHYSICAL EXAM:    Constitutional:  (  x ) NAD,   ( x  )awake and alert  HEENT: PERR, EOMI,    Neck: Soft and supple, No LAD, No JVD  Respiratory:  (  x  Breath sounds are clear bilaterally,    (   ) wheezing, rales or rhonchi  Cardiovascular:     ( x  )S1 and S2, regular rate and rhythm, no Murmurs, gallops or rubs  Gastrointestinal:  (  x )Bowel Sounds present, soft,   (  )nontender, nondistended,    Extremities:    (  ) peripheral edema  Vascular: 2+ peripheral pulses  Neurological:    ( x   )A/O x 3,   (  ) focal deficits  Musculoskeletal:    ( x  )  normal strength b/l upper  (     ) normal  lower extremities  Skin: No rashes    MEDICATIONS:  MEDICATIONS  (STANDING):  acetaminophen  IVPB .. 1000 milliGRAM(s) IV Intermittent once  aspirin enteric coated 81 milliGRAM(s) Oral daily  atorvastatin 40 milliGRAM(s) Oral at bedtime  chlorhexidine 2% Cloths 1 Application(s) Topical daily  clopidogrel Tablet 75 milliGRAM(s) Oral daily  dextrose 5%. 1000 milliLiter(s) (50 mL/Hr) IV Continuous <Continuous>  dextrose 50% Injectable 12.5 Gram(s) IV Push once  dextrose 50% Injectable 25 Gram(s) IV Push once  dextrose 50% Injectable 25 Gram(s) IV Push once  docusate sodium 100 milliGRAM(s) Oral two times a day  furosemide   Injectable 60 milliGRAM(s) IV Push every 12 hours  heparin  Injectable 5000 Unit(s) SubCutaneous every 12 hours  hydrALAZINE 50 milliGRAM(s) Oral every 8 hours  insulin glargine Injectable (LANTUS) 80 Unit(s) SubCutaneous at bedtime  insulin lispro (HumaLOG) corrective regimen sliding scale   SubCutaneous three times a day before meals  insulin lispro (HumaLOG) corrective regimen sliding scale   SubCutaneous at bedtime  insulin lispro Injectable (HumaLOG) 38 Unit(s) SubCutaneous before breakfast  insulin lispro Injectable (HumaLOG) 38 Unit(s) SubCutaneous before lunch  insulin lispro Injectable (HumaLOG) 42 Unit(s) SubCutaneous before dinner  isosorbide   dinitrate Tablet (ISORDIL) 30 milliGRAM(s) Oral three times a day  levothyroxine 50 MICROGram(s) Oral daily  lidocaine   Patch 1 Patch Transdermal daily  melatonin 3 milliGRAM(s) Oral at bedtime  metoprolol succinate ER 25 milliGRAM(s) Oral daily  montelukast 10 milliGRAM(s) Oral daily  pantoprazole    Tablet 40 milliGRAM(s) Oral before breakfast  polyethylene glycol 3350 17 Gram(s) Oral daily  senna 2 Tablet(s) Oral at bedtime  spironolactone 25 milliGRAM(s) Oral daily      LABS: All Labs Reviewed:                        9.1    8.04  )-----------( 275      ( 08 Aug 2019 08:34 )             29.4     08-08    138  |  98  |  66<H>  ----------------------------<  212<H>  3.7   |  23  |  1.74<H>    Ca    9.7      08 Aug 2019 06:11  Mg     2.3     08-08    TPro  7.5  /  Alb  3.8  /  TBili  0.2  /  DBili  x   /  AST  32  /  ALT  29  /  AlkPhos  105  08-07          Blood Culture:   Urine Culture      RADIOLOGY/EKG:    ASSESSMENT AND PLAN:  74 yo woman with PMHx of HTN, T2DM (A1c 7.4%), CAD s/p CABG (LIMA to LAD, SVG to OM, SVG to PDA 2014 at [facility]), non-dilated ICM (EF 20-25%), severe mitral regurgitation s/p mitral clip (6/13/19 Dr. Newman), severe pulm HTN, CKD, hypothyroidism, who is presenting to ED after experiencing worsening SOB and intermittent chest pain for [facility] at [facility]. Of note, pt was recently discharged on 6/19 after having mitral clip procedure completed. She initially required BiPAP with improvement in her respiratory status following diuresis. Initiated on vasopressors and inotropes in CCU, which were subsequently weaned off. Infectious work up was negative.    #HFrEF likely 2/2 ICM with MR s/p alonso clip  - c/w Lasix 60 IV BID  May change to oral Demadex as before before discharge home   - Will likely switch back to torsemide 100 mg daily tomorrow. May add metolazone a few times a week.   - Check daily standing weights, Is and Os, and daily lactates.   - continue hydralazine 50 mg TID and isordil 30 TID  - continue spironolactone 25 mg  - continue Metoprolol Succinate 25 mg PO Daily   - No further cardiac testing at this time.      #CAD  - continue ASA and statin   - Pt with chest pain, but also with end stage cardiomyopathy, coronary disease, had MitraClip and no further intervention feasible. She should not have trops checked.    #SVT - likely Atrial Tach vs. Atrial Flutter   - Amio initially not given due to elevated LFTs likely in the setting of congestion.   - recheck LFTs.   - Monitor on tele.   - continue metoprolol at current dose as above  - If pt has SVT again can consider amio   #diabetes on 80 and the Lantus	 and regular insulin 3 times a day at night she is receiving 40 units secondary to elevated hyperglycemia  DVT PPX: patient on aspirin will DC heparin secondary to bleeding in the abdominal wall discussed with nursing and medical team    ADVANCED DIRECTIVE:    DISPOSITION:

## 2019-08-08 NOTE — PROGRESS NOTE ADULT - SUBJECTIVE AND OBJECTIVE BOX
[facility] [place] [facility]   MD GONZALEZ SHAH D.O, PA    TELEPHONE NUMBERS:  OFFICE: [phone]  DR. SANTOYO CELL: [phone]  JUSTEN FIELD CELL: [phone]  DR. RIDER CELL:  [phone]    RENAL FOLLOW UP NOTE  --------------------------------------------------------------------------------  HPI: Pt seen and examined at bedside. Pt admits to weakness.   Denies SOB, chest pain     PAST HISTORY  --------------------------------------------------------------------------------  No significant changes to PMH, PSH, FHx, SHx, unless otherwise noted    ALLERGIES & MEDICATIONS  --------------------------------------------------------------------------------  Allergies    azithromycin (Hives; Pruritus)    Intolerances      Standing Inpatient Medications  acetaminophen  IVPB .. 1000 milliGRAM(s) IV Intermittent once  aspirin enteric coated 81 milliGRAM(s) Oral daily  atorvastatin 40 milliGRAM(s) Oral at bedtime  chlorhexidine 2% Cloths 1 Application(s) Topical daily  clopidogrel Tablet 75 milliGRAM(s) Oral daily  dextrose 5%. 1000 milliLiter(s) IV Continuous <Continuous>  dextrose 50% Injectable 12.5 Gram(s) IV Push once  dextrose 50% Injectable 25 Gram(s) IV Push once  dextrose 50% Injectable 25 Gram(s) IV Push once  docusate sodium 100 milliGRAM(s) Oral two times a day  furosemide   Injectable 60 milliGRAM(s) IV Push every 12 hours  heparin  Injectable 5000 Unit(s) SubCutaneous every 12 hours  hydrALAZINE 50 milliGRAM(s) Oral every 8 hours  insulin glargine Injectable (LANTUS) 80 Unit(s) SubCutaneous at bedtime  insulin lispro (HumaLOG) corrective regimen sliding scale   SubCutaneous three times a day before meals  insulin lispro (HumaLOG) corrective regimen sliding scale   SubCutaneous at bedtime  insulin lispro Injectable (HumaLOG) 38 Unit(s) SubCutaneous before breakfast  insulin lispro Injectable (HumaLOG) 38 Unit(s) SubCutaneous before lunch  insulin lispro Injectable (HumaLOG) 42 Unit(s) SubCutaneous before dinner  isosorbide   dinitrate Tablet (ISORDIL) 30 milliGRAM(s) Oral three times a day  levothyroxine 50 MICROGram(s) Oral daily  lidocaine   Patch 1 Patch Transdermal daily  melatonin 3 milliGRAM(s) Oral at bedtime  metoprolol succinate ER 25 milliGRAM(s) Oral daily  montelukast 10 milliGRAM(s) Oral daily  Nephro-enrico 1 Tablet(s) Oral daily  oxyCODONE    IR 5 milliGRAM(s) Oral once  pantoprazole    Tablet 40 milliGRAM(s) Oral before breakfast  polyethylene glycol 3350 17 Gram(s) Oral daily  senna 2 Tablet(s) Oral at bedtime  spironolactone 25 milliGRAM(s) Oral daily    PRN Inpatient Medications  acetaminophen   Tablet .. 650 milliGRAM(s) Oral every 6 hours PRN  dextrose 40% Gel 15 Gram(s) Oral once PRN  glucagon  Injectable 1 milliGRAM(s) IntraMuscular once PRN      REVIEW OF SYSTEMS  --------------------------------------------------------------------------------  General: no fever  CVS: no chest pain  RESP: no sob, no cough  ABD: no abdominal pain  : no dysuria,  MSK: no edema     VITALS/PHYSICAL EXAM  --------------------------------------------------------------------------------  T(C): 36.5 (08-08-19 @ 11:30), Max: 36.8 (08-08-19 @ 04:45)  HR: 70 (08-08-19 @ 11:30) (64 - 73)  BP: 108/61 (08-08-19 @ 11:30) (104/60 - 118/55)  RR: 18 (08-08-19 @ 11:30) (18 - 18)  SpO2: 100% (08-08-19 @ 11:30) (99% - 100%)  Wt(kg): --        08-07-19 @ 07:01  -  08-08-19 @ 07:00  --------------------------------------------------------  IN: 580 mL / OUT: 950 mL / NET: -370 mL    08-08-19 @ 07:01  -  08-08-19 @ 11:35  --------------------------------------------------------  IN: 120 mL / OUT: 0 mL / NET: 120 mL      Physical Exam:  	Gen: NAD  	HEENT: MONICA  	Pulm: Course BS B/L  	CV: S1S2  	Abd: Soft, +BS  	Ext: No LE edema B/L                      Neuro: Awake   	Skin: Warm and Dry     LABS/STUDIES  --------------------------------------------------------------------------------              9.1    8.04  >-----------<  275      [08-08-19 @ 08:34]              29.4     138  |  98  |  66  ----------------------------<  212      [08-08-19 @ 06:11]  3.7   |  23  |  1.74        Ca     9.7     [08-08-19 @ 06:11]      Mg     2.3     [08-08-19 @ 06:11]    TPro  7.5  /  Alb  3.8  /  TBili  0.2  /  DBili  x   /  AST  32  /  ALT  29  /  AlkPhos  105  [08-07-19 @ 06:03]      Creatinine Trend:  SCr 1.74 [08-08 @ 06:11]  SCr 1.81 [08-07 @ 06:03]  SCr 1.87 [08-06 @ 06:35]  SCr 1.62 [08-05 @ 06:45]  SCr 1.75 [08-04 @ 07:02]    Urinalysis - [07-09-19 @ 13:28]      Color Yellow / Appearance Clear / SG 1.012 / pH 6.0      Gluc Negative / Ketone Negative  / Bili Negative / Urobili Negative       Blood Trace / Protein Trace / Leuk Est Negative / Nitrite Negative      RBC 1 / WBC 1 / Hyaline 0 / Gran  / Sq Epi  / Non Sq Epi 0 / Bacteria Moderate      Iron 42, TIBC 348, %sat 12      [07-05-19 @ 22:32]  Ferritin 130      [07-05-19 @ 22:32]  .4 (Ca --)      [04-25-19 @ 05:45]   --  PTH 43.55 (Ca --)      [09-10-18 @ 07:15]   --  PTH 36.60 (Ca --)      [09-08-18 @ 03:15]   --  Vitamin D (25OH) 25.9      [05-01-19 @ 04:00]  HbA1c 7.4      [07-05-19 @ 22:32]  TSH 2.00      [07-05-19 @ 22:32]  Lipid: chol 148, , HDL 35,       [06-01-19 @ 08:00]

## 2019-08-09 ENCOUNTER — TRANSCRIPTION ENCOUNTER (OUTPATIENT)
Age: 74
End: 2019-08-09

## 2019-08-09 LAB
ANION GAP SERPL CALC-SCNC: 16 MMOL/L — SIGNIFICANT CHANGE UP (ref 5–17)
BUN SERPL-MCNC: 63 MG/DL — HIGH (ref 7–23)
CALCIUM SERPL-MCNC: 10.4 MG/DL — SIGNIFICANT CHANGE UP (ref 8.4–10.5)
CHLORIDE SERPL-SCNC: 96 MMOL/L — SIGNIFICANT CHANGE UP (ref 96–108)
CO2 SERPL-SCNC: 23 MMOL/L — SIGNIFICANT CHANGE UP (ref 22–31)
CREAT SERPL-MCNC: 1.88 MG/DL — HIGH (ref 0.5–1.3)
GLUCOSE BLDC GLUCOMTR-MCNC: 103 MG/DL — HIGH (ref 70–99)
GLUCOSE BLDC GLUCOMTR-MCNC: 145 MG/DL — HIGH (ref 70–99)
GLUCOSE BLDC GLUCOMTR-MCNC: 160 MG/DL — HIGH (ref 70–99)
GLUCOSE BLDC GLUCOMTR-MCNC: 234 MG/DL — HIGH (ref 70–99)
GLUCOSE BLDC GLUCOMTR-MCNC: 241 MG/DL — HIGH (ref 70–99)
GLUCOSE SERPL-MCNC: 216 MG/DL — HIGH (ref 70–99)
HCT VFR BLD CALC: 28.3 % — LOW (ref 34.5–45)
HGB BLD-MCNC: 9.4 G/DL — LOW (ref 11.5–15.5)
MCHC RBC-ENTMCNC: 28.9 PG — SIGNIFICANT CHANGE UP (ref 27–34)
MCHC RBC-ENTMCNC: 33.1 GM/DL — SIGNIFICANT CHANGE UP (ref 32–36)
MCV RBC AUTO: 87.5 FL — SIGNIFICANT CHANGE UP (ref 80–100)
PLATELET # BLD AUTO: 295 K/UL — SIGNIFICANT CHANGE UP (ref 150–400)
POTASSIUM SERPL-MCNC: 4.1 MMOL/L — SIGNIFICANT CHANGE UP (ref 3.5–5.3)
POTASSIUM SERPL-SCNC: 4.1 MMOL/L — SIGNIFICANT CHANGE UP (ref 3.5–5.3)
RBC # BLD: 3.24 M/UL — LOW (ref 3.8–5.2)
RBC # FLD: 16.2 % — HIGH (ref 10.3–14.5)
SODIUM SERPL-SCNC: 135 MMOL/L — SIGNIFICANT CHANGE UP (ref 135–145)
WBC # BLD: 9.3 K/UL — SIGNIFICANT CHANGE UP (ref 3.8–10.5)
WBC # FLD AUTO: 9.3 K/UL — SIGNIFICANT CHANGE UP (ref 3.8–10.5)

## 2019-08-09 PROCEDURE — 99233 SBSQ HOSP IP/OBS HIGH 50: CPT

## 2019-08-09 RX ORDER — METOPROLOL TARTRATE 50 MG
1 TABLET ORAL
Qty: 30 | Refills: 0
Start: 2019-08-09 | End: 2019-09-07

## 2019-08-09 RX ORDER — INSULIN LISPRO 100/ML
24 VIAL (ML) SUBCUTANEOUS
Qty: 0 | Refills: 0 | DISCHARGE

## 2019-08-09 RX ORDER — LIDOCAINE 4 G/100G
1 CREAM TOPICAL
Qty: 30 | Refills: 0
Start: 2019-08-09 | End: 2019-09-07

## 2019-08-09 RX ORDER — HYDRALAZINE HCL 50 MG
1 TABLET ORAL
Qty: 90 | Refills: 0
Start: 2019-08-09 | End: 2019-09-07

## 2019-08-09 RX ORDER — ISOSORBIDE DINITRATE 5 MG/1
1 TABLET ORAL
Qty: 90 | Refills: 0
Start: 2019-08-09 | End: 2019-09-07

## 2019-08-09 RX ORDER — INSULIN GLARGINE 100 [IU]/ML
50 INJECTION, SOLUTION SUBCUTANEOUS
Qty: 0 | Refills: 0 | DISCHARGE

## 2019-08-09 RX ORDER — CLOPIDOGREL BISULFATE 75 MG/1
1 TABLET, FILM COATED ORAL
Qty: 30 | Refills: 0
Start: 2019-08-09 | End: 2019-09-07

## 2019-08-09 RX ADMIN — Medication 650 MILLIGRAM(S): at 00:59

## 2019-08-09 RX ADMIN — Medication 38 UNIT(S): at 08:02

## 2019-08-09 RX ADMIN — Medication 46 UNIT(S): at 17:00

## 2019-08-09 RX ADMIN — SPIRONOLACTONE 25 MILLIGRAM(S): 25 TABLET, FILM COATED ORAL at 05:35

## 2019-08-09 RX ADMIN — LIDOCAINE 1 PATCH: 4 CREAM TOPICAL at 11:07

## 2019-08-09 RX ADMIN — INSULIN GLARGINE 84 UNIT(S): 100 INJECTION, SOLUTION SUBCUTANEOUS at 21:20

## 2019-08-09 RX ADMIN — Medication 100 MILLIGRAM(S): at 17:39

## 2019-08-09 RX ADMIN — Medication 1 TABLET(S): at 11:55

## 2019-08-09 RX ADMIN — Medication 81 MILLIGRAM(S): at 11:55

## 2019-08-09 RX ADMIN — MONTELUKAST 10 MILLIGRAM(S): 4 TABLET, CHEWABLE ORAL at 11:55

## 2019-08-09 RX ADMIN — PANTOPRAZOLE SODIUM 40 MILLIGRAM(S): 20 TABLET, DELAYED RELEASE ORAL at 05:36

## 2019-08-09 RX ADMIN — ISOSORBIDE DINITRATE 30 MILLIGRAM(S): 5 TABLET ORAL at 05:35

## 2019-08-09 RX ADMIN — Medication 650 MILLIGRAM(S): at 22:16

## 2019-08-09 RX ADMIN — CLOPIDOGREL BISULFATE 75 MILLIGRAM(S): 75 TABLET, FILM COATED ORAL at 11:55

## 2019-08-09 RX ADMIN — ISOSORBIDE DINITRATE 30 MILLIGRAM(S): 5 TABLET ORAL at 13:46

## 2019-08-09 RX ADMIN — LIDOCAINE 1 PATCH: 4 CREAM TOPICAL at 07:05

## 2019-08-09 RX ADMIN — Medication 2: at 08:02

## 2019-08-09 RX ADMIN — ATORVASTATIN CALCIUM 40 MILLIGRAM(S): 80 TABLET, FILM COATED ORAL at 21:19

## 2019-08-09 RX ADMIN — Medication 100 MILLIGRAM(S): at 05:35

## 2019-08-09 RX ADMIN — Medication 50 MILLIGRAM(S): at 13:46

## 2019-08-09 RX ADMIN — Medication 3 MILLIGRAM(S): at 21:19

## 2019-08-09 RX ADMIN — Medication 650 MILLIGRAM(S): at 11:55

## 2019-08-09 RX ADMIN — Medication 650 MILLIGRAM(S): at 23:54

## 2019-08-09 RX ADMIN — SENNA PLUS 2 TABLET(S): 8.6 TABLET ORAL at 21:19

## 2019-08-09 RX ADMIN — Medication 60 MILLIGRAM(S): at 17:38

## 2019-08-09 RX ADMIN — Medication 25 MILLIGRAM(S): at 05:35

## 2019-08-09 RX ADMIN — Medication 1: at 17:00

## 2019-08-09 RX ADMIN — Medication 50 MILLIGRAM(S): at 05:35

## 2019-08-09 RX ADMIN — ISOSORBIDE DINITRATE 30 MILLIGRAM(S): 5 TABLET ORAL at 21:19

## 2019-08-09 RX ADMIN — Medication 50 MICROGRAM(S): at 05:35

## 2019-08-09 RX ADMIN — Medication 650 MILLIGRAM(S): at 12:55

## 2019-08-09 RX ADMIN — Medication 38 UNIT(S): at 12:29

## 2019-08-09 RX ADMIN — POLYETHYLENE GLYCOL 3350 17 GRAM(S): 17 POWDER, FOR SOLUTION ORAL at 11:55

## 2019-08-09 RX ADMIN — Medication 60 MILLIGRAM(S): at 05:35

## 2019-08-09 RX ADMIN — Medication 50 MILLIGRAM(S): at 21:19

## 2019-08-09 RX ADMIN — LIDOCAINE 1 PATCH: 4 CREAM TOPICAL at 21:20

## 2019-08-09 NOTE — PROGRESS NOTE ADULT - ASSESSMENT
Pt is a 74 yo woman with PMHx of HTN, T2DM, CAD s/p CABG (LIMA to LAD, SVG to OM, SVG to PDA 2014 at [facility]), non-dilated ICM (EF 20-25%), severe mitral regurgitation s/p mitral clip (6/13/19 Dr. Newman), severe pulm HTN, CKD, hypothyroidism admitted with worsening SOB.    A/P:  MERVIN  Likely sec to cardiogenic shock  Scr 1.88 today.   Pt currently on lasix 60 IV BID and remains on oral Spironolactone   Continue diuretic therapy per cardiology.  Optimize glucose control  Monitor renal function   Avoid further nephrotoxics, NSAIDS, RCA    CKD stage 4:  baseline Scr 1.8-2.0. Scr stable at 1.88 today.   Renal function fluctuates sec to CHF  Monitor renal function at present    SOB:  in setting of HF. Improving on diuretic therapy.   Continue diuretics per cardiology    Acidosis   Sec to Renal failure  Improved and is currently at goal   Monitor serum Co2 at present    Hypokalemia:  in the setting of diuretic therapy. Serum potassium at goal after repletion  Goal potassium of 4 (due to cardiac hx)     Anemia:   Hb stable  Pt with iron deficiency anemia.

## 2019-08-09 NOTE — PROGRESS NOTE ADULT - SUBJECTIVE AND OBJECTIVE BOX
Endocrinology Attending Covering for Dr. Banks      Chief complaint  Patient is a 73y old  Female who presents with a chief complaint of Hypoxia, SOB (09 Aug 2019 08:52)   Review of systems  Patient in bed, looks comfortable, no fever,  had no hypoglycemia.    Labs and Fingersticks  CAPILLARY BLOOD GLUCOSE      POCT Blood Glucose.: 145 mg/dL (09 Aug 2019 11:52)  POCT Blood Glucose.: 234 mg/dL (09 Aug 2019 07:51)  POCT Blood Glucose.: 241 mg/dL (09 Aug 2019 02:50)  POCT Blood Glucose.: 241 mg/dL (08 Aug 2019 23:18)  POCT Blood Glucose.: 107 mg/dL (08 Aug 2019 21:47)  POCT Blood Glucose.: 101 mg/dL (08 Aug 2019 21:17)  POCT Blood Glucose.: 142 mg/dL (08 Aug 2019 16:36)      Anion Gap, Serum: 16 (08-09 @ 06:06)  Anion Gap, Serum: 17 (08-08 @ 06:11)      Calcium, Total Serum: 10.4 (08-09 @ 06:06)  Calcium, Total Serum: 9.7 (08-08 @ 06:11)          08-09    135  |  96  |  63<H>  ----------------------------<  216<H>  4.1   |  23  |  1.88<H>    Ca    10.4      09 Aug 2019 06:06  Mg     2.3     08-08                          9.4    9.3   )-----------( 295      ( 09 Aug 2019 06:06 )             28.3     Medications  MEDICATIONS  (STANDING):  acetaminophen  IVPB .. 1000 milliGRAM(s) IV Intermittent once  aspirin enteric coated 81 milliGRAM(s) Oral daily  atorvastatin 40 milliGRAM(s) Oral at bedtime  chlorhexidine 2% Cloths 1 Application(s) Topical daily  clopidogrel Tablet 75 milliGRAM(s) Oral daily  dextrose 5%. 1000 milliLiter(s) (50 mL/Hr) IV Continuous <Continuous>  dextrose 50% Injectable 12.5 Gram(s) IV Push once  dextrose 50% Injectable 25 Gram(s) IV Push once  dextrose 50% Injectable 25 Gram(s) IV Push once  docusate sodium 100 milliGRAM(s) Oral two times a day  furosemide   Injectable 60 milliGRAM(s) IV Push every 12 hours  heparin  Injectable 5000 Unit(s) SubCutaneous every 12 hours  hydrALAZINE 50 milliGRAM(s) Oral every 8 hours  insulin glargine Injectable (LANTUS) 84 Unit(s) SubCutaneous at bedtime  insulin lispro (HumaLOG) corrective regimen sliding scale   SubCutaneous three times a day before meals  insulin lispro (HumaLOG) corrective regimen sliding scale   SubCutaneous at bedtime  insulin lispro Injectable (HumaLOG) 38 Unit(s) SubCutaneous before breakfast  insulin lispro Injectable (HumaLOG) 38 Unit(s) SubCutaneous before lunch  insulin lispro Injectable (HumaLOG) 46 Unit(s) SubCutaneous before dinner  isosorbide   dinitrate Tablet (ISORDIL) 30 milliGRAM(s) Oral three times a day  levothyroxine 50 MICROGram(s) Oral daily  lidocaine   Patch 1 Patch Transdermal daily  melatonin 3 milliGRAM(s) Oral at bedtime  metoprolol succinate ER 25 milliGRAM(s) Oral daily  montelukast 10 milliGRAM(s) Oral daily  Nephro-enrico 1 Tablet(s) Oral daily  pantoprazole    Tablet 40 milliGRAM(s) Oral before breakfast  polyethylene glycol 3350 17 Gram(s) Oral daily  senna 2 Tablet(s) Oral at bedtime  spironolactone 25 milliGRAM(s) Oral daily      Physical Exam  General: Patient comfortable in bed  Vital Signs Last 12 Hrs  T(F): 97.7 (08-09-19 @ 11:39), Max: 98 (08-09-19 @ 04:41)  HR: 74 (08-09-19 @ 11:45) (66 - 74)  BP: 114/64 (08-09-19 @ 11:45) (101/59 - 116/61)  BP(mean): --  RR: 17 (08-09-19 @ 11:39) (17 - 18)  SpO2: 98% (08-09-19 @ 11:45) (98% - 100%)  Neck: No palpable thyroid nodules.  CVS: S1S2, No murmurs  Respiratory: No wheezing, no crepitations  GI: Abdomen soft, bowel sounds positive  Musculoskeletal:  edema lower extremities.   Skin: No skin rashes, no ecchymosis    Diagnostics

## 2019-08-09 NOTE — DISCHARGE NOTE PROVIDER - NSDCFUADDINST_GEN_ALL_CORE_FT
Please call Dr. Emelia Herring at [phone] to schedule an appointment for Next week Thursday 9/12/19. Please tell the staff you just got out of hospital so they can squeeze you in.

## 2019-08-09 NOTE — DISCHARGE NOTE PROVIDER - CARE PROVIDERS DIRECT ADDRESSES
,[email or web address],DirectAddress_[email or web address] ,[email or web address],DirectAddress_Unknown,DirectAddress_Unknown ,[email or web address],DirectAddress_Unknown,DirectAddress_Unknown,[email or web address],DirectAddress_Unknown,DirectAddress_Unknown,[email or web address],DirectAddress_Unknown,DirectAddress_Unknown

## 2019-08-09 NOTE — DISCHARGE NOTE PROVIDER - NSDCCPCAREPLAN_GEN_ALL_CORE_FT
PRINCIPAL DISCHARGE DIAGNOSIS  Diagnosis: Shortness of breath  Assessment and Plan of Treatment: Likely from CHF secondary to ischemic cardiomyopathy.   Hx of MR, s/p alonso clip  *****continue with diuretic as directed. *****  continue hydralazine 50 mg and isordil 30mg three times a day.  continue spironolactone 25 mg and Toprol 25 mg  Daily         SECONDARY DISCHARGE DIAGNOSES  Diagnosis: Right shoulder pain  Assessment and Plan of Treatment: pt able to move full range of motion. chronic pain over a year.   continue with lidocaine patch, warm compression, and tylenol as needed.    Diagnosis: CAD (coronary artery disease)  Assessment and Plan of Treatment: continue ASA and statin.   end stage cardiomyopathy, coronary disease, s/p MitraClip and no further intervention feasible.    Diagnosis: SVT (supraventricular tachycardia)  Assessment and Plan of Treatment: maintained sinus rhythm. PRINCIPAL DISCHARGE DIAGNOSIS  Diagnosis: Shortness of breath  Assessment and Plan of Treatment: Likely from CHF secondary to ischemic cardiomyopathy.   Hx of MR, s/p alonso clip  *****continue with diuretic as directed. *****  continue hydralazine 50 mg and isordil 30mg three times a day.  continue spironolactone 25 mg and Toprol 25 mg  Daily         SECONDARY DISCHARGE DIAGNOSES  Diagnosis: Left shoulder pain  Assessment and Plan of Treatment: pt was able to move full range of motion. chronic pain over a year.   continue with lidocaine patch, warm compression, and tylenol as needed.    Diagnosis: CAD (coronary artery disease)  Assessment and Plan of Treatment: continue ASA and statin.   end stage cardiomyopathy, coronary disease, s/p MitraClip and no further intervention feasible.    Diagnosis: SVT (supraventricular tachycardia)  Assessment and Plan of Treatment: maintained sinus rhythm. PRINCIPAL DISCHARGE DIAGNOSIS  Diagnosis: CHF, acute  Assessment and Plan of Treatment: Weigh yourself daily.  If you gain 3lbs in 3 days, or 5lbs in a week call your Health Care Provider.  Do not eat or drink foods containing more than 2000mg of salt (sodium) in your diet every day.  Call your Health Care Provider if you have any swelling or increased swelling in your feet, ankles, and/or stomach.  Take all of your medication as directed.  If you become dizzy call your Health Care Provider.        SECONDARY DISCHARGE DIAGNOSES  Diagnosis: Atrial flutter  Assessment and Plan of Treatment:     Diagnosis: Elevated liver enzymes  Assessment and Plan of Treatment:     Diagnosis: Left shoulder pain  Assessment and Plan of Treatment: pt was able to move full range of motion. chronic pain over a year.   continue with lidocaine patch, warm compression, and tylenol as needed.    Diagnosis: CAD (coronary artery disease)  Assessment and Plan of Treatment: Coronary artery disease is a condition where the arteries the supply the heart muscle get clogges with fatty deposits & puts you at risk for a heart attack  Call your doctor if you have any new pain, pressure, or discomfort in the center of your chest, pain, tingling or discomfort in arms, back, neck, jaw, or stomach, shortness of breath, nausea, vomiting, burping or heartburn, sweating, cold and clammy skin, racing or abnormal heartbeat for more than 10 minutes or if they keep coming & going.  Call 911 and do not tr to get to hospital by care  You can help yourself with lefestyle changes (quitting smoking if you smoke), eat lots of fruits & vegetables & low fat dairy products, not a lot of meat & fatty foods, walk or some form of physical activity most days of the week, lose weight if you are overweight  Take your cardiac medication as prescribed to lower cholesterol, to lower blood pressure, aspirin to prevent blood clots, and diabetes control  Make sure to keep appointments with doctor for cardiac follow up care      Diagnosis: SVT (supraventricular tachycardia)  Assessment and Plan of Treatment: Continue current medications, follow up with cardiology PRINCIPAL DISCHARGE DIAGNOSIS  Diagnosis: CHF, acute  Assessment and Plan of Treatment: Weigh yourself daily.  If you gain 3lbs in 3 days, or 5lbs in a week call your Health Care Provider.  Do not eat or drink foods containing more than 2000mg of salt (sodium) in your diet every day.  Call your Health Care Provider if you have any swelling or increased swelling in your feet, ankles, and/or stomach.  Take all of your medication as directed.  If you become dizzy call your Health Care Provider.        SECONDARY DISCHARGE DIAGNOSES  Diagnosis: Anemia  Assessment and Plan of Treatment: Follow up with PMD for management    Diagnosis: Severe mitral regurgitation  Assessment and Plan of Treatment: severe mitral regurgitation status post mitral clip (6/13/19 Dr. Newman), Please arrange for the outpatient cardiology follow-up within 4-6 weeks with at the [facility] by calling [phone].      Diagnosis: Diabetes mellitus  Assessment and Plan of Treatment: HgA1C this admission was 7.4 on 7/5/19  Make sure you get your HgA1c checked every three months.  If you take oral diabetes medications, check your blood glucose two times a day.  If you take insulin, check your blood glucose before meals and at bedtime.  It's important not to skip any meals.  Keep a log of your blood glucose results and always take it with you to your doctor appointments.  Keep a list of your current medications including injectables and over the counter medications and bring this medication list with you to all your doctor appointments.  If you have not seen your ophthalmologist this year call for appointment.  Check your feet daily for redness, sores, or openings. Do not self treat. If no improvement in two days call your primary care physician for an appointment.  Low blood sugar (hypoglycemia) is a blood sugar below 70mg/dl. Check your blood sugar if you feel signs/symptoms of hypoglycemia. If your blood sugar is below 70 take 15 grams of carbohydrates (ex 4 oz of apple juice, 3-4 glucose tablets, or 4-6 oz of regular soda) wait 15 minutes and repeat blood sugar to make sure it comes up above 70.  If your blood sugar is above 70 and you are due for a meal, have a meal.  If you are not due for a meal have a snack.  This snack helps keeps your blood sugar at a safe range.      Diagnosis: Atrial flutter  Assessment and Plan of Treatment: This condition puts you at risk for has stroke and heart attack  It helps if you control your blood pressure, not drink more than 1-2 alcohol drinks per day, cut down on caffeine, getting treatment for over active thyroid gland, and get regular exercise  Call your doctor if you feel your heart racing or beating unusually, chest tightness or pain, lightheaded, faint, shortness of breath especially with exercise  It is important to take your heart medication as prescribed  You may be on anticoagulation which is very important to take as directed - you may need blood work to monitor drug levels      Diagnosis: Elevated liver enzymes  Assessment and Plan of Treatment: Follow up with PMD for management and repeat lab work    Diagnosis: Left shoulder pain  Assessment and Plan of Treatment: pt was able to move full range of motion. chronic pain over a year.   continue with lidocaine patch, warm compression, and tylenol as needed.    Diagnosis: CAD (coronary artery disease)  Assessment and Plan of Treatment: Coronary artery disease is a condition where the arteries the supply the heart muscle get clogges with fatty deposits & puts you at risk for a heart attack  Call your doctor if you have any new pain, pressure, or discomfort in the center of your chest, pain, tingling or discomfort in arms, back, neck, jaw, or stomach, shortness of breath, nausea, vomiting, burping or heartburn, sweating, cold and clammy skin, racing or abnormal heartbeat for more than 10 minutes or if they keep coming & going.  Call 911 and do not tr to get to hospital by care  You can help yourself with lefestyle changes (quitting smoking if you smoke), eat lots of fruits & vegetables & low fat dairy products, not a lot of meat & fatty foods, walk or some form of physical activity most days of the week, lose weight if you are overweight  Take your cardiac medication as prescribed to lower cholesterol, to lower blood pressure, aspirin to prevent blood clots, and diabetes control  Make sure to keep appointments with doctor for cardiac follow up care      Diagnosis: CKD (chronic kidney disease), stage IV  Assessment and Plan of Treatment: Avoid taking (NSAIDs) - (ex: Ibuprofen, Advil, Celebrex, Naprosyn)  Avoid taking any nephrotoxic agents (can harm kidneys) - Intravenous contrast for diagnostic testing, combination cold medications.  Have all medications adjusted for your renal function by your Health Care Provider.  Blood pressure control is important.  Take all medication as prescribed.      Diagnosis: SVT (supraventricular tachycardia)  Assessment and Plan of Treatment: Continue current medications, follow up with cardiology PRINCIPAL DISCHARGE DIAGNOSIS  Diagnosis: CHF, acute  Assessment and Plan of Treatment: Weigh yourself daily.  If you gain 3lbs in 3 days, or 5lbs in a week call your Health Care Provider.  Do not eat or drink foods containing more than 2000mg of salt (sodium) in your diet every day.  Call your Health Care Provider if you have any swelling or increased swelling in your feet, ankles, and/or stomach.  Take all of your medication as directed.  If you become dizzy call your Health Care Provider.        SECONDARY DISCHARGE DIAGNOSES  Diagnosis: Insulin resistance  Assessment and Plan of Treatment: High dose insulin :  Novolog 60 units premeals, Levimir 90 units subcutaneuos at bedtime and 75 units in am   Patient is a candidate for U500 or insulin pump, which can only be done as outpatient.   Follow up outpatient  with your endocrinologist within 2 weeks    Diagnosis: Severe mitral regurgitation  Assessment and Plan of Treatment: severe mitral regurgitation status post mitral clip (6/13/19 Dr. Newman), Please arrange for the outpatient cardiology follow-up within 4-6 weeks with at the [facility] by calling [phone].      Diagnosis: CKD (chronic kidney disease), stage IV  Assessment and Plan of Treatment: Avoid taking (NSAIDs) - (ex: Ibuprofen, Advil, Celebrex, Naprosyn)  Avoid taking any nephrotoxic agents (can harm kidneys) - Intravenous contrast for diagnostic testing, combination cold medications.  Have all medications adjusted for your renal function by your Health Care Provider.  Blood pressure control is important.  Take all medication as prescribed.      Diagnosis: Anemia  Assessment and Plan of Treatment: Follow up with PMD for management    Diagnosis: Diabetes mellitus  Assessment and Plan of Treatment: HgA1C this admission was 7.4 on 7/5/19  Make sure you get your HgA1c checked every three months.  If you take oral diabetes medications, check your blood glucose two times a day.  If you take insulin, check your blood glucose before meals and at bedtime.  It's important not to skip any meals.  Keep a log of your blood glucose results and always take it with you to your doctor appointments.  Keep a list of your current medications including injectables and over the counter medications and bring this medication list with you to all your doctor appointments.  If you have not seen your ophthalmologist this year call for appointment.  Check your feet daily for redness, sores, or openings. Do not self treat. If no improvement in two days call your primary care physician for an appointment.  Low blood sugar (hypoglycemia) is a blood sugar below 70mg/dl. Check your blood sugar if you feel signs/symptoms of hypoglycemia. If your blood sugar is below 70 take 15 grams of carbohydrates (ex 4 oz of apple juice, 3-4 glucose tablets, or 4-6 oz of regular soda) wait 15 minutes and repeat blood sugar to make sure it comes up above 70.  If your blood sugar is above 70 and you are due for a meal, have a meal.  If you are not due for a meal have a snack.  This snack helps keeps your blood sugar at a safe range.      Diagnosis: Atrial flutter  Assessment and Plan of Treatment: This condition puts you at risk for has stroke and heart attack  It helps if you control your blood pressure, not drink more than 1-2 alcohol drinks per day, cut down on caffeine, getting treatment for over active thyroid gland, and get regular exercise  Call your doctor if you feel your heart racing or beating unusually, chest tightness or pain, lightheaded, faint, shortness of breath especially with exercise  It is important to take your heart medication as prescribed  You may be on anticoagulation which is very important to take as directed - you may need blood work to monitor drug levels      Diagnosis: Elevated liver enzymes  Assessment and Plan of Treatment: Follow up with PMD for management and repeat lab work    Diagnosis: Left shoulder pain  Assessment and Plan of Treatment: pt was able to move full range of motion. chronic pain over a year.   continue with lidocaine patch, warm compression, and tylenol as needed.    Diagnosis: CAD (coronary artery disease)  Assessment and Plan of Treatment: Coronary artery disease is a condition where the arteries the supply the heart muscle get clogges with fatty deposits & puts you at risk for a heart attack  Call your doctor if you have any new pain, pressure, or discomfort in the center of your chest, pain, tingling or discomfort in arms, back, neck, jaw, or stomach, shortness of breath, nausea, vomiting, burping or heartburn, sweating, cold and clammy skin, racing or abnormal heartbeat for more than 10 minutes or if they keep coming & going.  Call 911 and do not tr to get to hospital by care  You can help yourself with lefestyle changes (quitting smoking if you smoke), eat lots of fruits & vegetables & low fat dairy products, not a lot of meat & fatty foods, walk or some form of physical activity most days of the week, lose weight if you are overweight  Take your cardiac medication as prescribed to lower cholesterol, to lower blood pressure, aspirin to prevent blood clots, and diabetes control  Make sure to keep appointments with doctor for cardiac follow up care      Diagnosis: SVT (supraventricular tachycardia)  Assessment and Plan of Treatment: Continue current medications, follow up with cardiology

## 2019-08-09 NOTE — PROGRESS NOTE ADULT - SUBJECTIVE AND OBJECTIVE BOX
CHIEF COMPLAINT:    SUBJECTIVE:     REVIEW OF SYSTEMS:    CONSTITUTIONAL: (  )  weakness,  (  ) fevers or chills  EYES/ENT: (  )visual changes;     NECK: (  ) pain or stiffness  RESPIRATORY:   (  )cough, wheezing, hemoptysis;  (  ) shortness of breath  CARDIOVASCULAR:  (  )chest pain or palpitations  GASTROINTESTINAL:   (  )abdominal or epigastric pain.  (  ) nausea, vomiting, or hematemesis;   (   ) diarrhea or constipation.   GENITOURINARY:   (    ) dysuria, frequency or hematuria  NEUROLOGICAL:  (   ) numbness or weakness   All other review of systems is negative unless indicated above    Vital Signs Last 24 Hrs  T(C): 36.7 (09 Aug 2019 04:41), Max: 36.8 (08 Aug 2019 19:49)  T(F): 98 (09 Aug 2019 04:41), Max: 98.3 (08 Aug 2019 19:49)  HR: 66 (09 Aug 2019 04:41) (66 - 72)  BP: 101/59 (09 Aug 2019 04:41) (101/59 - 124/64)  BP(mean): --  RR: 18 (09 Aug 2019 04:41) (18 - 19)  SpO2: 100% (09 Aug 2019 04:41) (95% - 100%)    I&O's Summary    08 Aug 2019 07:01  -  09 Aug 2019 07:00  --------------------------------------------------------  IN: 120 mL / OUT: 350 mL / NET: -230 mL    09 Aug 2019 07:01  -  09 Aug 2019 08:52  --------------------------------------------------------  IN: 220 mL / OUT: 250 mL / NET: -30 mL        CAPILLARY BLOOD GLUCOSE      POCT Blood Glucose.: 234 mg/dL (09 Aug 2019 07:51)  POCT Blood Glucose.: 241 mg/dL (09 Aug 2019 02:50)  POCT Blood Glucose.: 241 mg/dL (08 Aug 2019 23:18)  POCT Blood Glucose.: 107 mg/dL (08 Aug 2019 21:47)  POCT Blood Glucose.: 101 mg/dL (08 Aug 2019 21:17)  POCT Blood Glucose.: 142 mg/dL (08 Aug 2019 16:36)  POCT Blood Glucose.: 309 mg/dL (08 Aug 2019 11:58)  POCT Blood Glucose.: 238 mg/dL (08 Aug 2019 09:06)      PHYSICAL EXAM:    Constitutional:  (   ) NAD,   (   )awake and alert  HEENT: PERR, EOMI,    Neck: Soft and supple, No LAD, No JVD  Respiratory:  (    Breath sounds are clear bilaterally,    (   ) wheezing, rales or rhonchi  Cardiovascular:     (   )S1 and S2, regular rate and rhythm, no Murmurs, gallops or rubs  Gastrointestinal:  (   )Bowel Sounds present, soft,   (  )nontender, nondistended,    Extremities:    (  ) peripheral edema  Vascular: 2+ peripheral pulses  Neurological:    (    )A/O x 3,   (  ) focal deficits  Musculoskeletal:    (   )  normal strength b/l upper  (     ) normal  lower extremities  Skin: No rashes    MEDICATIONS:  MEDICATIONS  (STANDING):  acetaminophen  IVPB .. 1000 milliGRAM(s) IV Intermittent once  aspirin enteric coated 81 milliGRAM(s) Oral daily  atorvastatin 40 milliGRAM(s) Oral at bedtime  chlorhexidine 2% Cloths 1 Application(s) Topical daily  clopidogrel Tablet 75 milliGRAM(s) Oral daily  dextrose 5%. 1000 milliLiter(s) (50 mL/Hr) IV Continuous <Continuous>  dextrose 50% Injectable 12.5 Gram(s) IV Push once  dextrose 50% Injectable 25 Gram(s) IV Push once  dextrose 50% Injectable 25 Gram(s) IV Push once  docusate sodium 100 milliGRAM(s) Oral two times a day  furosemide   Injectable 60 milliGRAM(s) IV Push every 12 hours  heparin  Injectable 5000 Unit(s) SubCutaneous every 12 hours  hydrALAZINE 50 milliGRAM(s) Oral every 8 hours  insulin glargine Injectable (LANTUS) 84 Unit(s) SubCutaneous at bedtime  insulin lispro (HumaLOG) corrective regimen sliding scale   SubCutaneous three times a day before meals  insulin lispro (HumaLOG) corrective regimen sliding scale   SubCutaneous at bedtime  insulin lispro Injectable (HumaLOG) 38 Unit(s) SubCutaneous before breakfast  insulin lispro Injectable (HumaLOG) 38 Unit(s) SubCutaneous before lunch  insulin lispro Injectable (HumaLOG) 46 Unit(s) SubCutaneous before dinner  isosorbide   dinitrate Tablet (ISORDIL) 30 milliGRAM(s) Oral three times a day  levothyroxine 50 MICROGram(s) Oral daily  lidocaine   Patch 1 Patch Transdermal daily  melatonin 3 milliGRAM(s) Oral at bedtime  metoprolol succinate ER 25 milliGRAM(s) Oral daily  montelukast 10 milliGRAM(s) Oral daily  Nephro-enrico 1 Tablet(s) Oral daily  pantoprazole    Tablet 40 milliGRAM(s) Oral before breakfast  polyethylene glycol 3350 17 Gram(s) Oral daily  senna 2 Tablet(s) Oral at bedtime  spironolactone 25 milliGRAM(s) Oral daily      LABS: All Labs Reviewed:                        9.4    9.3   )-----------( 295      ( 09 Aug 2019 06:06 )             28.3     08-09    135  |  96  |  63<H>  ----------------------------<  216<H>  4.1   |  23  |  1.88<H>    Ca    10.4      09 Aug 2019 06:06  Mg     2.3     08-08            Blood Culture:   Urine Culture      RADIOLOGY/EKG:    ASSESSMENT AND PLAN:    DVT PPX:    ADVANCED DIRECTIVE:    DISPOSITION: CHIEF COMPLAINT:  patient laying in the bed awake and verbal no new complaints except fatigue  SUBJECTIVE:     REVIEW OF SYSTEMS:    CONSTITUTIONAL: ( x )  weakness,  (  ) fevers or chills  EYES/ENT: (  )visual changes;     NECK: (  ) pain or stiffness  RESPIRATORY:   (  )cough, wheezing, hemoptysis;  ( x ) shortness of breath  CARDIOVASCULAR:  (  )chest pain or palpitations  GASTROINTESTINAL:   (  )abdominal or epigastric pain.  (  ) nausea, vomiting, or hematemesis;   (   ) diarrhea or constipation.   GENITOURINARY:   (    ) dysuria, frequency or hematuria  NEUROLOGICAL:  (   ) numbness or weakness   All other review of systems is negative unless indicated above    Vital Signs Last 24 Hrs  T(C): 36.7 (09 Aug 2019 04:41), Max: 36.8 (08 Aug 2019 19:49)  T(F): 98 (09 Aug 2019 04:41), Max: 98.3 (08 Aug 2019 19:49)  HR: 66 (09 Aug 2019 04:41) (66 - 72)  BP: 101/59 (09 Aug 2019 04:41) (101/59 - 124/64)  BP(mean): --  RR: 18 (09 Aug 2019 04:41) (18 - 19)  SpO2: 100% (09 Aug 2019 04:41) (95% - 100%)    I&O's Summary    08 Aug 2019 07:01  -  09 Aug 2019 07:00  --------------------------------------------------------  IN: 120 mL / OUT: 350 mL / NET: -230 mL    09 Aug 2019 07:01  -  09 Aug 2019 08:52  --------------------------------------------------------  IN: 220 mL / OUT: 250 mL / NET: -30 mL        CAPILLARY BLOOD GLUCOSE      POCT Blood Glucose.: 234 mg/dL (09 Aug 2019 07:51)  POCT Blood Glucose.: 241 mg/dL (09 Aug 2019 02:50)  POCT Blood Glucose.: 241 mg/dL (08 Aug 2019 23:18)  POCT Blood Glucose.: 107 mg/dL (08 Aug 2019 21:47)  POCT Blood Glucose.: 101 mg/dL (08 Aug 2019 21:17)  POCT Blood Glucose.: 142 mg/dL (08 Aug 2019 16:36)  POCT Blood Glucose.: 309 mg/dL (08 Aug 2019 11:58)  POCT Blood Glucose.: 238 mg/dL (08 Aug 2019 09:06)      PHYSICAL EXAM:    Constitutional:  (x   ) NAD,   (   )awake and alert  HEENT: PERR, EOMI,    Neck: Soft and supple, No LAD, No JVD  Respiratory:  (  x  Breath sounds are clear bilaterally,    (   ) wheezing, rales or rhonchi  Cardiovascular:     (  x )S1 and S2, regular rate and rhythm, no Murmurs, gallops or rubs  Gastrointestinal:  (   )Bowel Sounds present, soft,   (  )nontender, nondistended,    Extremities:    (  ) peripheral edema  Vascular: 2+ peripheral pulses  Neurological:    ( x   )A/O x 3,   (  ) focal deficits  Musculoskeletal:    ( x  )  normal strength b/l upper  (     ) normal  lower extremities  Skin: No rashes    MEDICATIONS:  MEDICATIONS  (STANDING):  acetaminophen  IVPB .. 1000 milliGRAM(s) IV Intermittent once  aspirin enteric coated 81 milliGRAM(s) Oral daily  atorvastatin 40 milliGRAM(s) Oral at bedtime  chlorhexidine 2% Cloths 1 Application(s) Topical daily  clopidogrel Tablet 75 milliGRAM(s) Oral daily  dextrose 5%. 1000 milliLiter(s) (50 mL/Hr) IV Continuous <Continuous>  dextrose 50% Injectable 12.5 Gram(s) IV Push once  dextrose 50% Injectable 25 Gram(s) IV Push once  dextrose 50% Injectable 25 Gram(s) IV Push once  docusate sodium 100 milliGRAM(s) Oral two times a day  furosemide   Injectable 60 milliGRAM(s) IV Push every 12 hours  heparin  Injectable 5000 Unit(s) SubCutaneous every 12 hours  hydrALAZINE 50 milliGRAM(s) Oral every 8 hours  insulin glargine Injectable (LANTUS) 84 Unit(s) SubCutaneous at bedtime  insulin lispro (HumaLOG) corrective regimen sliding scale   SubCutaneous three times a day before meals  insulin lispro (HumaLOG) corrective regimen sliding scale   SubCutaneous at bedtime  insulin lispro Injectable (HumaLOG) 38 Unit(s) SubCutaneous before breakfast  insulin lispro Injectable (HumaLOG) 38 Unit(s) SubCutaneous before lunch  insulin lispro Injectable (HumaLOG) 46 Unit(s) SubCutaneous before dinner  isosorbide   dinitrate Tablet (ISORDIL) 30 milliGRAM(s) Oral three times a day  levothyroxine 50 MICROGram(s) Oral daily  lidocaine   Patch 1 Patch Transdermal daily  melatonin 3 milliGRAM(s) Oral at bedtime  metoprolol succinate ER 25 milliGRAM(s) Oral daily  montelukast 10 milliGRAM(s) Oral daily  Nephro-enrico 1 Tablet(s) Oral daily  pantoprazole    Tablet 40 milliGRAM(s) Oral before breakfast  polyethylene glycol 3350 17 Gram(s) Oral daily  senna 2 Tablet(s) Oral at bedtime  spironolactone 25 milliGRAM(s) Oral daily      LABS: All Labs Reviewed:                        9.4    9.3   )-----------( 295      ( 09 Aug 2019 06:06 )             28.3     08-09    135  |  96  |  63<H>  ----------------------------<  216<H>  4.1   |  23  |  1.88<H>    Ca    10.4      09 Aug 2019 06:06  Mg     2.3     08-08            Blood Culture:   Urine Culture      RADIOLOGY/EKG:    ASSESSMENT/plan  74 yo woman with PMHx of HTN, T2DM (A1c 7.4%), CAD s/p CABG (LIMA to LAD, SVG to OM, SVG to PDA 2014 at [facility]), non-dilated ICM (EF 20-25%), severe mitral regurgitation s/p mitral clip (6/13/19 Dr. Newman), severe pulm HTN, CKD, hypothyroidism, who is presenting to ED after experiencing worsening SOB and intermittent chest pain for 1 week at [facility]. Of note, pt was recently discharged on 6/19 after having mitral clip procedure completed. She initially required BiPAP with improvement in her respiratory status following diuresis. Initiated on vasopressors and inotropes in CCU, which were subsequently weaned off. Infectious work up was negative.    #HFrEF likely 2/2 ICM with MR s/p alonso clip  - c/w Lasix 60 IV BID  May change to oral Demadex as before before discharge home   - Will likely switch back to torsemide 100 mg daily    -    - continue hydralazine 50 mg TID and isordil 30 TID  - continue spironolactone 25 mg  - continue Metoprolol Succinate 25 mg PO Daily   - No further cardiac testing at this time.      #CAD  - continue ASA and statin   - Pt with chest pain, but also with end stage cardiomyopathy, coronary disease, had MitraClip and no further intervention feasible. She should not have trops checked.    #SVT - likely Atrial Tach vs. Atrial Flutter   - Amio initially not given due to elevated LFTs likely in the setting of congestion.   - recheck LFTs.   - Monitor on tele.   - continue metoprolol at current dose as above  - If pt has SVT again can consider amio   #diabetes on  84   unit  Lantus and 	 and regular insulin 3 times a day at night she is receiving 46 units secondary to elevated hyperglycemia at bedtime        DVT PPX:    ADVANCED DIRECTIVE:    DISPOSITION: family requesting home but in my opinion she is the very high risk for readmission or intubation at home secondary to CHF/SVT/and diabetes on high-dose of insulin she is receiving almost 160 units of insulin daily

## 2019-08-09 NOTE — DISCHARGE NOTE PROVIDER - CARE PROVIDER_API CALL
Hira Gregg)  Cardiovascular Disease; Internal Medicine  [address] [place] [address]  Phone: [phone]  Fax: [phone]  Follow Up Time:     Derrell Wang)  Critical Care Medicine; Internal Medicine  [address] [place] [address], [address] [place] [address]  Phone: [phone]  Fax: [phone]  Follow Up Time: 1 week Hira Gregg)  Cardiovascular Disease; Internal Medicine  [address] [place] [address]  Phone: [phone]  Fax: [phone]  Follow Up Time: 2 weeks    Derrell Wang)  Critical Care Medicine; Internal Medicine  [facility] [place] [address], [address]st [place] [address]  Phone: [phone]  Fax: [phone]  Follow Up Time: 1 week    Jillian Banks)  EndocrinologyMetabDiabetes; Internal Medicine  [address] [place] [address]  Phone: [phone]  Fax: [phone]  Follow Up Time: 1 week Hira Gregg)  Cardiovascular Disease; Internal Medicine  [address] [place] [address]  Phone: [phone]  Fax: [phone]  Follow Up Time: 2 weeks    Derrell Wang)  Critical Care Medicine; Internal Medicine  [address] [place] [address], [address]st [place] [address]  Phone: [phone]  Fax: [phone]  Follow Up Time: 1 week    Jillian Banks)  EndocrinologyMetabDiabetes; Internal Medicine  [address] [place] [address]  Phone: [phone]  Fax: [phone]  Follow Up Time: 1 week    Anamika Newman)  Surgery; Thoracic and Cardiac Surgery  [address] [place] [address]  Phone: [phone]  Fax: [phone]  Follow Up Time: Hira Gregg)  Cardiovascular Disease; Internal Medicine  [address] [place] [address]  Phone: [phone]  Fax: [phone]  Follow Up Time: 2 weeks    Derrell Wang)  Critical Care Medicine; Internal Medicine  [address] [place] [address], [address]st [place] [address]  Phone: [phone]  Fax: [phone]  Follow Up Time: 1 week    Jillian Banks)  EndocrinologyMetabDiabetes; Internal Medicine  [address] [place] [address]  Phone: [phone]  Fax: [phone]  Follow Up Time: 1 week    Anamika Newman)  Surgery; Thoracic and Cardiac Surgery  [address] [place] [address]  Phone: [phone]  Fax: [phone]  Follow Up Time: 2 weeks Hira Gregg)  Cardiovascular Disease; Internal Medicine  [address] [place] [address]  Phone: [phone]  Fax: [phone]  Follow Up Time: 2 weeks    Derrell Wang)  Critical Care Medicine; Internal Medicine  [address], [address] [place] [address]  Phone: [phone]  Fax: [phone]  Follow Up Time: 1 week    Jillian Banks)  EndocrinologyMetabDiabetes; Internal Medicine  [address] [place] [address]  Phone: [phone]  Fax: [phone]  Follow Up Time: 1 week    Anamika Newman)  Surgery; Thoracic and Cardiac Surgery  [address] [place] [address]  Phone: [phone]  Fax: [phone]  Follow Up Time: 2 weeks    Emelia Herring)  EndocrinologyMetabDiabetes  [address] [place] [address]  Phone: [phone]  Fax: [phone]  Follow Up Time: 1 week    Bryant Muhammad)  Internal Medicine; Nephrology  [address], 2nd [place] [address]  Phone: [phone]  Fax: [phone]  Follow Up Time: 2 weeks

## 2019-08-09 NOTE — DISCHARGE NOTE PROVIDER - PROVIDER TOKENS
PROVIDER:[TOKEN:[[id number]]],PROVIDER:[TOKEN:[[id number]],FOLLOWUP:[1 week]] PROVIDER:[TOKEN:[[id number]],FOLLOWUP:[2 weeks]],PROVIDER:[TOKEN:[[id number]],FOLLOWUP:[1 week]],PROVIDER:[TOKEN:[[id number]],FOLLOWUP:[1 week]] PROVIDER:[TOKEN:[[id number]],FOLLOWUP:[2 weeks]],PROVIDER:[TOKEN:[[id number]],FOLLOWUP:[1 week]],PROVIDER:[TOKEN:[[id number]],FOLLOWUP:[1 week]],PROVIDER:[TOKEN:[[id number]]] PROVIDER:[TOKEN:[[id number]],FOLLOWUP:[2 weeks]],PROVIDER:[TOKEN:[[id number]],FOLLOWUP:[1 week]],PROVIDER:[TOKEN:[[id number]],FOLLOWUP:[1 week]],PROVIDER:[TOKEN:[[id number]],FOLLOWUP:[2 weeks]] PROVIDER:[TOKEN:[[id number]],FOLLOWUP:[2 weeks]],PROVIDER:[TOKEN:[[id number]],FOLLOWUP:[1 week]],PROVIDER:[TOKEN:[[id number]],FOLLOWUP:[1 week]],PROVIDER:[TOKEN:[[id number]],FOLLOWUP:[2 weeks]],PROVIDER:[TOKEN:[[id number]],FOLLOWUP:[1 week]],PROVIDER:[TOKEN:[[id number]],FOLLOWUP:[2 weeks]]

## 2019-08-09 NOTE — DISCHARGE NOTE PROVIDER - NSDCCAREPROVSEEN_GEN_ALL_CORE_FT
Bryant Muhammad, Angel Blue, Gunner Banks, Jillian  [facility] Medicine, Advance PracticeTeam  [facility] Palliative Care, Team

## 2019-08-09 NOTE — PROGRESS NOTE ADULT - SUBJECTIVE AND OBJECTIVE BOX
Diuresing well with symptomatic improvement with transition to orals. Complete progress note to follow.     Ravi Rodrigues MD   [phone] SUBJ:  No acute events overnight. Overall, she is clinically much improved since admission but has chronic dyspnea at rest, exacerbated with any activity. She appears eu-volemic on exam. No definitive diagnosis of atrial flutter with recurrent SVT while in cardiac ICU. Somewhat uncomfortable and tachypneic immediately after being repositioned in bed by nursing staff.  Tentatively planned for discharge to rehabilitation this weekend.     MEDICATIONS  (STANDING):  acetaminophen  IVPB .. 1000 milliGRAM(s) IV Intermittent once  aspirin enteric coated 81 milliGRAM(s) Oral daily  atorvastatin 40 milliGRAM(s) Oral at bedtime  chlorhexidine 2% Cloths 1 Application(s) Topical daily  clopidogrel Tablet 75 milliGRAM(s) Oral daily  dextrose 5%. 1000 milliLiter(s) (50 mL/Hr) IV Continuous <Continuous>  dextrose 50% Injectable 12.5 Gram(s) IV Push once  dextrose 50% Injectable 25 Gram(s) IV Push once  dextrose 50% Injectable 25 Gram(s) IV Push once  docusate sodium 100 milliGRAM(s) Oral two times a day  furosemide   Injectable 60 milliGRAM(s) IV Push every 12 hours  heparin  Injectable 5000 Unit(s) SubCutaneous every 12 hours  hydrALAZINE 50 milliGRAM(s) Oral every 8 hours  insulin glargine Injectable (LANTUS) 84 Unit(s) SubCutaneous at bedtime  insulin lispro (HumaLOG) corrective regimen sliding scale   SubCutaneous three times a day before meals  insulin lispro (HumaLOG) corrective regimen sliding scale   SubCutaneous at bedtime  insulin lispro Injectable (HumaLOG) 38 Unit(s) SubCutaneous before breakfast  insulin lispro Injectable (HumaLOG) 38 Unit(s) SubCutaneous before lunch  insulin lispro Injectable (HumaLOG) 46 Unit(s) SubCutaneous before dinner  isosorbide   dinitrate Tablet (ISORDIL) 30 milliGRAM(s) Oral three times a day  levothyroxine 50 MICROGram(s) Oral daily  lidocaine   Patch 1 Patch Transdermal daily  melatonin 3 milliGRAM(s) Oral at bedtime  metoprolol succinate ER 25 milliGRAM(s) Oral daily  montelukast 10 milliGRAM(s) Oral daily  Nephro-enrico 1 Tablet(s) Oral daily  pantoprazole    Tablet 40 milliGRAM(s) Oral before breakfast  polyethylene glycol 3350 17 Gram(s) Oral daily  senna 2 Tablet(s) Oral at bedtime  spironolactone 25 milliGRAM(s) Oral daily    MEDICATIONS  (PRN):  acetaminophen   Tablet .. 650 milliGRAM(s) Oral every 6 hours PRN Mild Pain (1 - 3), Moderate Pain (4 - 6)  dextrose 40% Gel 15 Gram(s) Oral once PRN Blood Glucose LESS THAN 70 milliGRAM(s)/deciliter  glucagon  Injectable 1 milliGRAM(s) IntraMuscular once PRN Glucose LESS THAN 70 milligrams/deciliter    Vital Signs Last 24 Hrs  T(C): 36.7 (09 Aug 2019 19:58), Max: 36.7 (09 Aug 2019 04:41)  T(F): 98.1 (09 Aug 2019 19:58), Max: 98.1 (09 Aug 2019 19:58)  HR: 73 (09 Aug 2019 21:18) (66 - 74)  BP: 110/59 (09 Aug 2019 21:18) (101/59 - 116/61)  RR: 17 (09 Aug 2019 19:58) (17 - 18)  SpO2: 97% (09 Aug 2019 19:58) (97% - 100%)    REVIEW OF SYSTEMS:  As pe HPI, otherwise unremarkable.     PHYSICAL EXAM:  · CONSTITUTIONAL: Well-developed, uncomfortable and tachypneic immediately after being repositioned in bed by nursing staff.   · EYES:  no drainage or redness  · NECK: supple.   ·RESPIRATORY:  airway patent; respirations somewhat labored; clear to auscultation bilaterally; no wheezing, rales, rhonchi.   · CARDIOVASCULAR: regular rate and rhythm   . GASTROINTESTINAL:  no distention  · EXTREMITIES: No cyanosis, clubbing or edema  · VASCULAR:  Equal and normal pulses (radial)  ·NEUROLOGICAL:  Alert and oriented x 3  · SKIN: No lesions; no rash  . LYMPH NODES: No lymphadedenopathy  · MUSCULOSKELETAL:  No calf tenderness     TELEMETRY: 8/9/2019  NSR, sinus tachycardia.     LABS:   CBC Full  -  ( 09 Aug 2019 06:06 )  WBC Count : 9.3 K/uL  RBC Count : 3.24 M/uL  Hemoglobin : 9.4 g/dL  Hematocrit : 28.3 %  Platelet Count - Automated : 295 K/uL  Mean Cell Volume : 87.5 fl  Mean Cell Hemoglobin : 28.9 pg  Mean Cell Hemoglobin Concentration : 33.1 gm/dL    08-09    135  |  96  |  63<H>  ----------------------------<  216<H>  4.1   |  23  |  1.88<H>    Ca    10.4      09 Aug 2019 06:06  Mg     2.3     08-08    IMPRESSION AND PLAN:  72 yo woman with PMHx of HTN, T2DM (A1c 7.4%), CAD s/p CABG (LIMA to LAD, SVG to OM, SVG to PDA 2014 at [facility]), non-dilated ICM (EF 20-25%), severe mitral regurgitation s/p mitral clip (6/13/19 Dr. Newman), severe pulm HTN, CKD, hypothyroidism, presenting with decompensated heart failure requiring brief inotropic support and diuresis with good clinical response in the setting of underlying anxiety and baseline chronic dyspnea.     1) HFrEF  ECF 20-25%  ICM with MR s/p Mitraclip  Improved respiratory status currently at baseline dyspnea in the setting of severe cardiomyopathy and severe valve pathology.   Saturating well on room air but severely anxious with NC in place on nose without oxygen flowing.     ·	Continue medical management with hydralazine 50 mg TID and isordil 30 TID, spironolactone 25 mg, Metoprolol Succinate 25 mg PO Daily.   ·	No plans for additional cardiac testing.   ·	Maintain diuresis as she appears euvolemic. 60 mg IV furosemide BID is roughly equivalent to 3 mg PO bumetanide BID or 30 mg PO torsemide BID with the latter two option with better bioavailability and potency.   ·	Clinically improved objectively and subjectively; per family, at baseline with anxiety playing a large role.    2) CAD  Stable.  Mildly symptomatic when she gets worked up and anxious but overall asymptomatic.   Setting of end stage cardiomyopathy, sever valve disease, and underlying CAD.     ·	Continue medical management with ASA 81 mg daily and atorvastatin 40 mg nightly.     3) Atrial Arrythmia, primarily SVT  Atrial arrythmia while critically ill in cardiac ICU without definitive diagnosis of a rhythm that would be indicated for theraputic anticoagulation.   No other episodes of atrial arrythmia while on the general practice floors for several days.     ·	Continue metoprolol succinate 25 mg daily.     Discharge disposition home and/or rehabilitation this weekend. Signing off. Please call with questions.     Ravi Rodrigues MD, MPH, EZEKIEL  Cardiovascular Specialist Attending  [facility]  C: [phone]  E: [email or web address]  (Cardiology nocturnist available every night 7 pm - 7 am; available week days for follow-up; general cardiology consult coverage weekend days)

## 2019-08-09 NOTE — PROGRESS NOTE ADULT - SUBJECTIVE AND OBJECTIVE BOX
[facility] [place] [facility]   MD GONZALEZ SHAH D.O, PA    TELEPHONE NUMBERS:  OFFICE: [phone]  DR. SANTOYO CELL: [phone]  JUSTEN FIELD CELL: [phone]  DR. RIDER CELL:  [phone]    RENAL FOLLOW UP NOTE  --------------------------------------------------------------------------------  HPI: Pt seen and examined at bedside. Pt admits to significant weakness. Poor oral intake.   Denies SOB, chest pain     PAST HISTORY  --------------------------------------------------------------------------------  No significant changes to PMH, PSH, FHx, SHx, unless otherwise noted    ALLERGIES & MEDICATIONS  --------------------------------------------------------------------------------  Allergies    azithromycin (Hives; Pruritus)    Intolerances      Standing Inpatient Medications  acetaminophen  IVPB .. 1000 milliGRAM(s) IV Intermittent once  aspirin enteric coated 81 milliGRAM(s) Oral daily  atorvastatin 40 milliGRAM(s) Oral at bedtime  chlorhexidine 2% Cloths 1 Application(s) Topical daily  clopidogrel Tablet 75 milliGRAM(s) Oral daily  dextrose 5%. 1000 milliLiter(s) IV Continuous <Continuous>  dextrose 50% Injectable 12.5 Gram(s) IV Push once  dextrose 50% Injectable 25 Gram(s) IV Push once  dextrose 50% Injectable 25 Gram(s) IV Push once  docusate sodium 100 milliGRAM(s) Oral two times a day  furosemide   Injectable 60 milliGRAM(s) IV Push every 12 hours  heparin  Injectable 5000 Unit(s) SubCutaneous every 12 hours  hydrALAZINE 50 milliGRAM(s) Oral every 8 hours  insulin glargine Injectable (LANTUS) 84 Unit(s) SubCutaneous at bedtime  insulin lispro (HumaLOG) corrective regimen sliding scale   SubCutaneous three times a day before meals  insulin lispro (HumaLOG) corrective regimen sliding scale   SubCutaneous at bedtime  insulin lispro Injectable (HumaLOG) 38 Unit(s) SubCutaneous before breakfast  insulin lispro Injectable (HumaLOG) 38 Unit(s) SubCutaneous before lunch  insulin lispro Injectable (HumaLOG) 46 Unit(s) SubCutaneous before dinner  isosorbide   dinitrate Tablet (ISORDIL) 30 milliGRAM(s) Oral three times a day  levothyroxine 50 MICROGram(s) Oral daily  lidocaine   Patch 1 Patch Transdermal daily  melatonin 3 milliGRAM(s) Oral at bedtime  metoprolol succinate ER 25 milliGRAM(s) Oral daily  montelukast 10 milliGRAM(s) Oral daily  Nephro-enrico 1 Tablet(s) Oral daily  pantoprazole    Tablet 40 milliGRAM(s) Oral before breakfast  polyethylene glycol 3350 17 Gram(s) Oral daily  senna 2 Tablet(s) Oral at bedtime  spironolactone 25 milliGRAM(s) Oral daily    PRN Inpatient Medications  acetaminophen   Tablet .. 650 milliGRAM(s) Oral every 6 hours PRN  dextrose 40% Gel 15 Gram(s) Oral once PRN  glucagon  Injectable 1 milliGRAM(s) IntraMuscular once PRN      REVIEW OF SYSTEMS  --------------------------------------------------------------------------------  General: no fever  CVS: no chest pain  RESP: no sob, no cough  ABD: no abdominal pain  : no dysuria,  MSK: no edema     VITALS/PHYSICAL EXAM  --------------------------------------------------------------------------------  T(C): 36.5 (08-09-19 @ 11:39), Max: 36.8 (08-08-19 @ 19:49)  HR: 69 (08-09-19 @ 17:36) (66 - 74)  BP: 107/54 (08-09-19 @ 17:36) (101/59 - 124/64)  RR: 18 (08-09-19 @ 17:36) (17 - 19)  SpO2: 100% (08-09-19 @ 17:36) (98% - 100%)  Wt(kg): --        08-08-19 @ 07:01  -  08-09-19 @ 07:00  --------------------------------------------------------  IN: 120 mL / OUT: 350 mL / NET: -230 mL    08-09-19 @ 07:01  -  08-09-19 @ 17:43  --------------------------------------------------------  IN: 460 mL / OUT: 1050 mL / NET: -590 mL    Physical Exam:  	Gen: NAD  	HEENT: MMM  	Pulm: Course BS B/L  	CV: S1S2  	Abd: Soft, +BS  	Ext: No LE edema B/L                      Neuro: Awake   	Skin: Warm and Dry     LABS/STUDIES  --------------------------------------------------------------------------------              9.4    9.3   >-----------<  295      [08-09-19 @ 06:06]              28.3     135  |  96  |  63  ----------------------------<  216      [08-09-19 @ 06:06]  4.1   |  23  |  1.88        Ca     10.4     [08-09-19 @ 06:06]      Mg     2.3     [08-08-19 @ 06:11]    Creatinine Trend:  SCr 1.88 [08-09 @ 06:06]  SCr 1.74 [08-08 @ 06:11]  SCr 1.81 [08-07 @ 06:03]  SCr 1.87 [08-06 @ 06:35]  SCr 1.62 [08-05 @ 06:45]    Iron 42, TIBC 348, %sat 12      [07-05-19 @ 22:32]  Ferritin 130      [07-05-19 @ 22:32]  .4 (Ca --)      [04-25-19 @ 05:45]   --  PTH 43.55 (Ca --)      [09-10-18 @ 07:15]   --  PTH 36.60 (Ca --)      [09-08-18 @ 03:15]   --  Vitamin D (25OH) 25.9      [05-01-19 @ 04:00]  HbA1c 7.4      [07-05-19 @ 22:32]  TSH 2.00      [07-05-19 @ 22:32]  Lipid: chol 148, , HDL 35,       [06-01-19 @ 08:00]

## 2019-08-09 NOTE — CONSULT NOTE ADULT - ASSESSMENT
Patient is a 73y old  Female admitted with Hypoxia and SOB, now with complaints of left neck shoulder and arm pain.      Currently on tylenol and lidoderm patch.  Also utilizing hot packs with relief.  Patient may have a cervical facet syndrome, and may possibly have a separate left shoulder problem, either OA or rotator cuff tear.  Would suggest consult with orthopedics as an outpatient for possible steroid injection if blood sugars are controlled.  No NSAIDS due to high BUN and creatinine.  Could add tramadol 25 mg Q 6 hours prn.        Minutes spent on total encounter: 20 minutes.        [facility]  [phone]

## 2019-08-09 NOTE — DISCHARGE NOTE PROVIDER - NSDCFUADDAPPT_GEN_ALL_CORE_FT
please follow up with your PMD in 1 week.   follow up with your cardiology and endocrinology in 1 ~ 2 weeks. please follow up with your PMD in 1-2 weeks after discharge from hospital     You have a tentative schedule for a follow up appointment with the [facility] on   September 11, 2019 at 12N.  The office will call you to confirm this date and time, or you may call [phone] as well.      Please call to schedule an appointment with Dr. Muhammad, nephrologist for outpatient follow up

## 2019-08-09 NOTE — PROGRESS NOTE ADULT - ASSESSMENT
Assessment  DMT2: 73y Female with DM T2 with hyperglycemia on insulin, blood sugars improve  101-241 yesterday, and 234-145 today  CAD: S/P cabg On medications, stable, monitored.  Hypothyroidism:  On synthroid 50  mcg po daily, asymptomatic.  HTN: Controlled, On med.  HLD; on statin  CKD: Monitor labs/BMP,         Plan:   DMT2:   will keep  Lantus to 84 units qhs, and    Humalog to 38 units before breakfast and Lunch and 46 units pre-dinner   in addition to correction scale coverage, monitor FS will FU  Patient counseled for compliance with consistent low carb diet.  CAD: S/P cabg On medications, stable, monitored.  Hypothyroidism: tsh 2.0  On synthroid 50  mcg po daily, asymptomatic. F/U tsh as outpatient  HTN: Continue treatment, monitoring, Primary team FU  HLD; on statin  CKD: Continue monitoring labs, renal FU  Thank you for consult will F/U  erika Woodard MD  [phone]

## 2019-08-09 NOTE — DISCHARGE NOTE PROVIDER - HOSPITAL COURSE
82 yo female with numerous comorbidities including diastolic HF, interstitial lung disease (on 2-3L of O2 at home), stage 4 Metastatic breast cancer (on chemotherapy), OA, gout, HTN, HLD, DM2 admitted for acute respiratory failure which is multifactorial          Problem/Plan - 1:    ·  Problem: Acute on chronic respiratory failure with hypoxia and hypercapnia.  Plan: resolved, acute on chronic sob/ZEE, required BIPAP on admission now back to baseline 3L NC, no sob    - suspect multifactorial: acute on chronic dHF, pleural effusion b/l on CT chest 8/1 and CXR with pulm edema. No PE on CTA on 8/1. PNA and ILD doubtful    - overall reports improvement in sob    - Pulmonology recs appreciated, no need for steroids, outpatient pulm f/u    -c/w NC 3L NC.          Problem/Plan - 2:    ·  Problem: Acute on chronic diastolic heart failure.  Plan: overloaded on admission now near euvolemic, elevated BNP on admission    -TTE, normal EF, diastolic dysfunction, mild-mod MR    -c/w lasix 40mg AM, decrease PM dose to 20mg     -strict I/O, daily weights (losing weight,    -monitor and replete lytes.          Problem/Plan - 3:    ·  Problem: Interstitial lung disease.  Plan: switched from Symbicort to Perforomist via nebulizer; on chronic oxygen 2-3L at home     - Continue with Budosenide/Performist inhaler    - c/w hydroxychloroquine bid    - Pt on Zyrtec at home. Given it is not on formulary, c/w loratadine while inpatient    - pulm consult: stable for discharge outpatient f/u.          Problem/Plan - 4:    ·  Problem: Breast cancer.  Plan: Stage IV breast CA    Per outpatient records, patient on active chemotherapy with chest wall recurrence, with recent PET Scan negative for distal mets    -Ct chest 8/1 with Patchy and nodular opacity peripheral right upper lobe deep to the R  indeterminate, Short-term follow-up CT needed to exclude underlying malignancy.          Problem/Plan - 5:    ·  Problem: Hyponatremia.  Plan: hypervolemic acute on chronic hyponatremia improved with diuresis, on chart review patient with chronic hyponatremia in these changes    -c/w diruetics.          Problem/Plan - 6:    Problem: Diabetes mellitus. Plan: elevated FS,, a1c 7.8    -per endocrine, c/w lantus  18 units qhs, decrease premeal to 7unit TID c/w DAFNE,    -cortisol, tsh, fsh, lh, ft4, wnl    -monitor FS.         Problem/Plan - 7:    ·  Problem: Hypertension.  Plan: BP stable    - Continue with amlodpine 10 mg q D    - Continue Losartan 100 mg q D.          Problem/Plan - 8:    ·  Problem: Gout.  Plan: gout/CPPD: b/l knee pain, xray with severe OA and chondrocalcinosis    -uric acid wnl, esr/crp mildly elevated    -c/w allopurinol    -rheum following, refusing colchicine 0.6mg, rheum to do intraarticular steroid vs outpatient.          Problem/Plan - 9:    ·  Problem: Hyperlipidemia.  Plan: - Atorvastatin 20 mg qhs. Pt is a 74 yo woman with PMHx of HTN, T2DM, CAD s/p CABG (LIMA to LAD, SVG to OM, SVG to PDA 2014 at [facility]), non-dilated ICM (EF 20-25%), severe mitral regurgitation s/p mitral clip (6/13/19 Dr. Newman), severe pulm HTN, CKD, hypothyroidism, who [facility] Also complaining of intermittent chest pain. Of note, pt was recently discharged on 6/19 after having mitral clip procedure completed. Pt says that she has been experiencing SOB for about 1 week. However, she was never noted to be hypoxic in rehab until today. She was not able to provide much hx 2/2 SOB and being on bipap. Daughter at bedside reporting that pt was well immediately after discharge. However, she gradually developed worsening SOB. Better with rest initially. Nothing alleviating SOB now. It is constant throughout the day, made worse with exertion. No fevers, chills, nausea, vomiting, cough, sputum production, chest tightness, pressure, palpitations, LOC, syncope. Pt is a 72 yo woman with PMHx of HTN, T2DM, CAD s/p CABG (LIMA to LAD, SVG to OM, SVG to PDA 2014 at [facility]), non-dilated ICM (EF 20-25%), severe mitral regurgitation s/p mitral clip (6/13/19 Dr. Newman), severe pulm HTN, CKD, hypothyroidism, who [facility] ED [facility] Also complaining of intermittent chest pain. Of note, pt was recently discharged on 6/19 after having mitral clip procedure completed. Pt says that she has been experiencing SOB for about 1 week. However, she was never noted to be hypoxic in rehab until today. She was not able to provide much hx 2/2 SOB and being on bipap. Daughter at bedside reporting that pt was well immediately after discharge. However, she gradually developed worsening SOB. Better with rest initially. Nothing alleviating SOB now. It is constant throughout the day, made worse with exertion. No fevers, chills, nausea, vomiting, cough, sputum production, chest tightness, pressure, palpitations, LOC, syncope.    Patient remain in the hospital due to hyperglycemia and insulin adjustment in the setting of Insulin  resistance.  Patient was seen by Endocrine with referral for ICU consult to place patient on an Insulin drip. ICU recommended     fluid management with LR bolus., but to no avail Blood glucoses remained in the 300 range.  Patient was started on High dose Novolog 70/30 and later changed to Levemir.    Patient discharge on Levemir 82 units in am and 90 units as HS, in addition to pre-meal  64 units of Humalog.     Call made to her outpatient endocrinologist , Dr. MARYLOU Herring. Discuss plan for patient to start Lantus U 500 and the high dose Insulin regiment. Dr. Herring will see the patient next week on 9/12/19 at 2pm     for follow up management of  her diabetes.  Diabetic teaching done with instruction on how to use he  Levemir  Flex pen    Also call to CHF office to schedule appointment for follow up visit.  Patient is tentative scheduled for 9/11 at 12 N,  will call to confirm date and time with patient and family.    Discuss with patient 's daughter to follow up with her cardiologist within the next 1-3 day  for outpatient follow up.      Patient is stable for discharge

## 2019-08-09 NOTE — CONSULT NOTE ADULT - SUBJECTIVE AND OBJECTIVE BOX
Chief Complaint:  Patient is a 73y old  Female admitted with Hypoxia and SOB, now with complaints of left neck shoulder and arm pain.    HPI:  Pt is a 72 yo woman with PMHx of HTN, T2DM, CAD s/p CABG (LIMA to LAD, SVG to OM, SVG to PDA 2014 at [facility]), non-dilated ICM (EF 20-25%), severe mitral regurgitation s/p mitral clip (6/13/19 Dr. Newman), severe pulm HTN, CKD, hypothyroidism, who [facility] Also complaining of intermittent chest pain. Of note, pt was recently discharged on 6/19 after having mitral clip procedure completed.    She states she has had waxing and waning neck pain, now exacerbated.  Pain is mid cervical radiating to the left with accompanied left shoulder pain and arm pain in a non-dermatomal fashion.  Denies weakness.        Pain Score:  6/10 numeric scale    Functional Impairment: 3 moderate stahl Index of [place] in ADLs    Current out- patient pain regimen: None    Out Patient Pain Management provider: None    [facility] Prescription Monitoring Program: Reference #[id number]    PAST MEDICAL & SURGICAL HISTORY:  GERD (gastroesophageal reflux disease)  CAD (coronary artery disease): s/p CABG  Hypothyroidism  Hyperlipidemia  Diabetes mellitus, type 2  S/P CABG (coronary artery bypass graft)    FAMILY HISTORY:  Family history of hypertension (Sibling): sister  Family history of diabetes mellitus (Sibling): brothers/sisters    SOCIAL HISTORY:    Opioid Risk Tool (ORT) Score: low    Allergies    azithromycin (Hives; Pruritus)    Intolerances    none    REVIEW OF SYSTEMS:  CONSTITUTIONAL: No fever, weight loss, or fatigue  NEUROLOGICAL: No headaches, tremors, dizziness or blurred vision  RESPIRATORY: No cough, wheezing, chills or hemoptysis  CARDIOVASCULAR: No chest pain, palpitations, dizziness  GASTROINTESTINAL: No abdominal or epigastric pain. No nausea, vomiting, or hematemesis; No diarrhea or constipation. No melena or hematochezia.  GENITOURINARY: No dysuria, frequency, hematuria, retention or incontinence  MUSCULOSKELETAL: No joint pain; + neck and left upper extremity pain, no weakness  SKIN: No itching, burning, rashes, or lesions   PSYCHIATRIC: No depression, anxiety, mood swings, + difficulty sleeping      MEDICATIONS  (STANDING):  acetaminophen  IVPB .. 1000 milliGRAM(s) IV Intermittent once  aspirin enteric coated 81 milliGRAM(s) Oral daily  atorvastatin 40 milliGRAM(s) Oral at bedtime  chlorhexidine 2% Cloths 1 Application(s) Topical daily  clopidogrel Tablet 75 milliGRAM(s) Oral daily  dextrose 5%. 1000 milliLiter(s) (50 mL/Hr) IV Continuous <Continuous>  dextrose 50% Injectable 12.5 Gram(s) IV Push once  dextrose 50% Injectable 25 Gram(s) IV Push once  dextrose 50% Injectable 25 Gram(s) IV Push once  docusate sodium 100 milliGRAM(s) Oral two times a day  furosemide   Injectable 60 milliGRAM(s) IV Push every 12 hours  heparin  Injectable 5000 Unit(s) SubCutaneous every 12 hours  hydrALAZINE 50 milliGRAM(s) Oral every 8 hours  insulin glargine Injectable (LANTUS) 84 Unit(s) SubCutaneous at bedtime  insulin lispro (HumaLOG) corrective regimen sliding scale   SubCutaneous three times a day before meals  insulin lispro (HumaLOG) corrective regimen sliding scale   SubCutaneous at bedtime  insulin lispro Injectable (HumaLOG) 38 Unit(s) SubCutaneous before breakfast  insulin lispro Injectable (HumaLOG) 38 Unit(s) SubCutaneous before lunch  insulin lispro Injectable (HumaLOG) 46 Unit(s) SubCutaneous before dinner  isosorbide   dinitrate Tablet (ISORDIL) 30 milliGRAM(s) Oral three times a day  levothyroxine 50 MICROGram(s) Oral daily  lidocaine   Patch 1 Patch Transdermal daily  melatonin 3 milliGRAM(s) Oral at bedtime  metoprolol succinate ER 25 milliGRAM(s) Oral daily  montelukast 10 milliGRAM(s) Oral daily  Nephro-enrico 1 Tablet(s) Oral daily  pantoprazole    Tablet 40 milliGRAM(s) Oral before breakfast  polyethylene glycol 3350 17 Gram(s) Oral daily  senna 2 Tablet(s) Oral at bedtime  spironolactone 25 milliGRAM(s) Oral daily    MEDICATIONS  (PRN):  acetaminophen   Tablet .. 650 milliGRAM(s) Oral every 6 hours PRN Mild Pain (1 - 3), Moderate Pain (4 - 6)  dextrose 40% Gel 15 Gram(s) Oral once PRN Blood Glucose LESS THAN 70 milliGRAM(s)/deciliter  glucagon  Injectable 1 milliGRAM(s) IntraMuscular once PRN Glucose LESS THAN 70 milligrams/deciliter      PHYSICAL EXAM:  GENERAL: NAD, well-developed, no signs of toxicity  NERVOUS SYSTEM:  Alert & Oriented X3, Good concentration;  Cranial Nerves II-XII intact  HEAD:  Atraumatic, Normocephalic, symmetrical  CHEST/LUNG: Clear to auscultation bilaterally  HEART: Regular rate and rhythm; No murmurs, rubs, or gallops  ABDOMEN: Soft, Nontender, Nondistended; Bowel sounds present  EXTREMITIES:  + Peripheral Pulses, No clubbing, cyanosis, or edema  MUSCULOSKELETAL: Motor Strength intact, B/L upper and lower extremities; moves all extremities but decreased ROM cervical spine laterally and decreased left shoulder ROM. Unable to perform empty can, Vela, lift off.    SKIN: No rashes or lesions    Vital Signs:  T(C): 36.5 (08-09-19 @ 11:39)  HR: 69 (08-09-19 @ 13:41)  BP: 106/58 (08-09-19 @ 13:41)  RR: 18 (08-09-19 @ 13:41)  SpO2: 100% (08-09-19 @ 13:41)      Pertinent labs/radiology: hematology and chemistry reviewed.                                       BUN 63, creatinine 1.88, glucose 234

## 2019-08-10 LAB
ANION GAP SERPL CALC-SCNC: 16 MMOL/L — SIGNIFICANT CHANGE UP (ref 5–17)
BUN SERPL-MCNC: 61 MG/DL — HIGH (ref 7–23)
CALCIUM SERPL-MCNC: 10.1 MG/DL — SIGNIFICANT CHANGE UP (ref 8.4–10.5)
CHLORIDE SERPL-SCNC: 100 MMOL/L — SIGNIFICANT CHANGE UP (ref 96–108)
CO2 SERPL-SCNC: 23 MMOL/L — SIGNIFICANT CHANGE UP (ref 22–31)
CREAT SERPL-MCNC: 2.07 MG/DL — HIGH (ref 0.5–1.3)
GLUCOSE BLDC GLUCOMTR-MCNC: 187 MG/DL — HIGH (ref 70–99)
GLUCOSE BLDC GLUCOMTR-MCNC: 263 MG/DL — HIGH (ref 70–99)
GLUCOSE BLDC GLUCOMTR-MCNC: 274 MG/DL — HIGH (ref 70–99)
GLUCOSE BLDC GLUCOMTR-MCNC: 333 MG/DL — HIGH (ref 70–99)
GLUCOSE SERPL-MCNC: 156 MG/DL — HIGH (ref 70–99)
HCT VFR BLD CALC: 29.1 % — LOW (ref 34.5–45)
HGB BLD-MCNC: 9.6 G/DL — LOW (ref 11.5–15.5)
MAGNESIUM SERPL-MCNC: 2.3 MG/DL — SIGNIFICANT CHANGE UP (ref 1.6–2.6)
MCHC RBC-ENTMCNC: 28.8 PG — SIGNIFICANT CHANGE UP (ref 27–34)
MCHC RBC-ENTMCNC: 32.8 GM/DL — SIGNIFICANT CHANGE UP (ref 32–36)
MCV RBC AUTO: 87.6 FL — SIGNIFICANT CHANGE UP (ref 80–100)
PLATELET # BLD AUTO: 305 K/UL — SIGNIFICANT CHANGE UP (ref 150–400)
POTASSIUM SERPL-MCNC: 4 MMOL/L — SIGNIFICANT CHANGE UP (ref 3.5–5.3)
POTASSIUM SERPL-SCNC: 4 MMOL/L — SIGNIFICANT CHANGE UP (ref 3.5–5.3)
RBC # BLD: 3.32 M/UL — LOW (ref 3.8–5.2)
RBC # FLD: 16.1 % — HIGH (ref 10.3–14.5)
SODIUM SERPL-SCNC: 139 MMOL/L — SIGNIFICANT CHANGE UP (ref 135–145)
WBC # BLD: 8.7 K/UL — SIGNIFICANT CHANGE UP (ref 3.8–10.5)
WBC # FLD AUTO: 8.7 K/UL — SIGNIFICANT CHANGE UP (ref 3.8–10.5)

## 2019-08-10 RX ORDER — TRAMADOL HYDROCHLORIDE 50 MG/1
25 TABLET ORAL EVERY 6 HOURS
Refills: 0 | Status: DISCONTINUED | OUTPATIENT
Start: 2019-08-10 | End: 2019-08-15

## 2019-08-10 RX ADMIN — Medication 100 MILLIGRAM(S): at 17:34

## 2019-08-10 RX ADMIN — SENNA PLUS 2 TABLET(S): 8.6 TABLET ORAL at 22:08

## 2019-08-10 RX ADMIN — Medication 2: at 22:10

## 2019-08-10 RX ADMIN — MONTELUKAST 10 MILLIGRAM(S): 4 TABLET, CHEWABLE ORAL at 11:18

## 2019-08-10 RX ADMIN — CHLORHEXIDINE GLUCONATE 1 APPLICATION(S): 213 SOLUTION TOPICAL at 07:53

## 2019-08-10 RX ADMIN — ISOSORBIDE DINITRATE 30 MILLIGRAM(S): 5 TABLET ORAL at 15:26

## 2019-08-10 RX ADMIN — Medication 46 UNIT(S): at 17:12

## 2019-08-10 RX ADMIN — LIDOCAINE 1 PATCH: 4 CREAM TOPICAL at 09:19

## 2019-08-10 RX ADMIN — INSULIN GLARGINE 84 UNIT(S): 100 INJECTION, SOLUTION SUBCUTANEOUS at 22:07

## 2019-08-10 RX ADMIN — POLYETHYLENE GLYCOL 3350 17 GRAM(S): 17 POWDER, FOR SOLUTION ORAL at 11:18

## 2019-08-10 RX ADMIN — Medication 50 MILLIGRAM(S): at 06:50

## 2019-08-10 RX ADMIN — PANTOPRAZOLE SODIUM 40 MILLIGRAM(S): 20 TABLET, DELAYED RELEASE ORAL at 06:50

## 2019-08-10 RX ADMIN — Medication 50 MILLIGRAM(S): at 22:10

## 2019-08-10 RX ADMIN — ATORVASTATIN CALCIUM 40 MILLIGRAM(S): 80 TABLET, FILM COATED ORAL at 22:09

## 2019-08-10 RX ADMIN — CLOPIDOGREL BISULFATE 75 MILLIGRAM(S): 75 TABLET, FILM COATED ORAL at 11:18

## 2019-08-10 RX ADMIN — Medication 38 UNIT(S): at 11:43

## 2019-08-10 RX ADMIN — Medication 1 TABLET(S): at 11:18

## 2019-08-10 RX ADMIN — Medication 100 MILLIGRAM(S): at 06:50

## 2019-08-10 RX ADMIN — Medication 3 MILLIGRAM(S): at 22:08

## 2019-08-10 RX ADMIN — ISOSORBIDE DINITRATE 30 MILLIGRAM(S): 5 TABLET ORAL at 06:50

## 2019-08-10 RX ADMIN — SPIRONOLACTONE 25 MILLIGRAM(S): 25 TABLET, FILM COATED ORAL at 06:50

## 2019-08-10 RX ADMIN — TRAMADOL HYDROCHLORIDE 25 MILLIGRAM(S): 50 TABLET ORAL at 11:24

## 2019-08-10 RX ADMIN — LIDOCAINE 1 PATCH: 4 CREAM TOPICAL at 06:47

## 2019-08-10 RX ADMIN — Medication 25 MILLIGRAM(S): at 06:50

## 2019-08-10 RX ADMIN — Medication 50 MILLIGRAM(S): at 15:26

## 2019-08-10 RX ADMIN — TRAMADOL HYDROCHLORIDE 25 MILLIGRAM(S): 50 TABLET ORAL at 12:35

## 2019-08-10 RX ADMIN — Medication 3: at 11:43

## 2019-08-10 RX ADMIN — Medication 60 MILLIGRAM(S): at 06:50

## 2019-08-10 RX ADMIN — ISOSORBIDE DINITRATE 30 MILLIGRAM(S): 5 TABLET ORAL at 22:07

## 2019-08-10 RX ADMIN — Medication 1: at 07:50

## 2019-08-10 RX ADMIN — Medication 38 UNIT(S): at 07:53

## 2019-08-10 RX ADMIN — Medication 50 MICROGRAM(S): at 06:50

## 2019-08-10 RX ADMIN — Medication 81 MILLIGRAM(S): at 11:18

## 2019-08-10 RX ADMIN — Medication 3: at 17:12

## 2019-08-10 RX ADMIN — LIDOCAINE 1 PATCH: 4 CREAM TOPICAL at 22:08

## 2019-08-10 RX ADMIN — Medication 60 MILLIGRAM(S): at 17:34

## 2019-08-10 NOTE — PROGRESS NOTE ADULT - SUBJECTIVE AND OBJECTIVE BOX
Dr. Muhammad  Office [phone]  Cell [phone]  [facility] FIELD  Cell [phone]      Patient is a 73y old  Female who presents with a chief complaint of Hypoxia, SOB (10 Aug 2019 11:44)      Patient seen and examined at bedside. No chest pain/sob    VITALS:  T(F): 97.7 (08-10-19 @ 11:28), Max: 98.1 (08-09-19 @ 19:58)  HR: 76 (08-10-19 @ 11:28)  BP: 112/64 (08-10-19 @ 11:28)  RR: 17 (08-10-19 @ 11:28)  SpO2: 98% (08-10-19 @ 11:28)  Wt(kg): --    08-09 @ 07:01  -  08-10 @ 07:00  --------------------------------------------------------  IN: 680 mL / OUT: 1700 mL / NET: -1020 mL    08-10 @ 07:01  -  08-10 @ 15:23  --------------------------------------------------------  IN: 460 mL / OUT: 800 mL / NET: -340 mL          PHYSICAL EXAM:  Constitutional: NAD  Neck: No JVD  Respiratory: CTAB, no wheezes, rales or rhonchi  Cardiovascular: S1, S2, RRR  Gastrointestinal: BS+, soft, NT/ND  Extremities: No peripheral edema    Hospital Medications:   MEDICATIONS  (STANDING):  acetaminophen  IVPB .. 1000 milliGRAM(s) IV Intermittent once  aspirin enteric coated 81 milliGRAM(s) Oral daily  atorvastatin 40 milliGRAM(s) Oral at bedtime  chlorhexidine 2% Cloths 1 Application(s) Topical daily  clopidogrel Tablet 75 milliGRAM(s) Oral daily  dextrose 5%. 1000 milliLiter(s) (50 mL/Hr) IV Continuous <Continuous>  dextrose 50% Injectable 12.5 Gram(s) IV Push once  dextrose 50% Injectable 25 Gram(s) IV Push once  dextrose 50% Injectable 25 Gram(s) IV Push once  docusate sodium 100 milliGRAM(s) Oral two times a day  furosemide   Injectable 60 milliGRAM(s) IV Push every 12 hours  heparin  Injectable 5000 Unit(s) SubCutaneous every 12 hours  hydrALAZINE 50 milliGRAM(s) Oral every 8 hours  insulin glargine Injectable (LANTUS) 84 Unit(s) SubCutaneous at bedtime  insulin lispro (HumaLOG) corrective regimen sliding scale   SubCutaneous three times a day before meals  insulin lispro (HumaLOG) corrective regimen sliding scale   SubCutaneous at bedtime  insulin lispro Injectable (HumaLOG) 38 Unit(s) SubCutaneous before breakfast  insulin lispro Injectable (HumaLOG) 38 Unit(s) SubCutaneous before lunch  insulin lispro Injectable (HumaLOG) 46 Unit(s) SubCutaneous before dinner  isosorbide   dinitrate Tablet (ISORDIL) 30 milliGRAM(s) Oral three times a day  levothyroxine 50 MICROGram(s) Oral daily  lidocaine   Patch 1 Patch Transdermal daily  melatonin 3 milliGRAM(s) Oral at bedtime  metoprolol succinate ER 25 milliGRAM(s) Oral daily  montelukast 10 milliGRAM(s) Oral daily  Nephro-enrico 1 Tablet(s) Oral daily  pantoprazole    Tablet 40 milliGRAM(s) Oral before breakfast  polyethylene glycol 3350 17 Gram(s) Oral daily  senna 2 Tablet(s) Oral at bedtime  spironolactone 25 milliGRAM(s) Oral daily      LABS:  08-10    139  |  100  |  61<H>  ----------------------------<  156<H>  4.0   |  23  |  2.07<H>    Ca    10.1      10 Aug 2019 05:11  Mg     2.3     08-10      Creatinine Trend: 2.07 <--, 1.88 <--, 1.74 <--, 1.81 <--, 1.87 <--, 1.62 <--, 1.75 <--                                9.6    8.7   )-----------( 305      ( 10 Aug 2019 05:11 )             29.1     Urine Studies:      Iron 42, TIBC 348, %sat 12      [07-05-19 @ 22:32]  Ferritin 130      [07-05-19 @ 22:32]  .4 (Ca --)      [04-25-19 @ 05:45]   --  PTH 43.55 (Ca --)      [09-10-18 @ 07:15]   --  PTH 36.60 (Ca --)      [09-08-18 @ 03:15]   --  Vitamin D (25OH) 25.9      [05-01-19 @ 04:00]  HbA1c 7.4      [07-05-19 @ 22:32]  TSH 2.00      [07-05-19 @ 22:32]  Lipid: chol 148, , HDL 35,       [06-01-19 @ 08:00]        RADIOLOGY & ADDITIONAL STUDIES:

## 2019-08-10 NOTE — PROGRESS NOTE ADULT - ASSESSMENT
Assessment  DMT2: 73y Female with DM T2 with hyperglycemia on insulin, blood sugars improve  130-145 yesterday, and 187 today  CAD: S/P cabg On medications, stable, monitored.  Hypothyroidism:  On synthroid 50  mcg po daily, asymptomatic.  HTN: Controlled, On med.  HLD; on statin  CKD: Monitor labs/BMP,         Plan:   DMT2:   will keep  Lantus to 84 units qhs, and    Humalog to 38 units before breakfast and Lunch and 46 units pre-dinner   in addition to correction scale coverage, monitor FS will FU  Patient counseled for compliance with consistent low carb diet.  CAD: S/P cabg On medications, stable, monitored.  Hypothyroidism: tsh 2.0  On synthroid 50  mcg po daily, asymptomatic. F/U tsh as outpatient  HTN: Continue treatment, monitoring, Primary team FU  HLD; on statin  CKD: Continue monitoring labs, renal FU  Thank you for consult will F/U  erika Woodard MD  [phone]

## 2019-08-10 NOTE — PROGRESS NOTE ADULT - SUBJECTIVE AND OBJECTIVE BOX
Endocrinology Attending Covering for Dr. Banks      Chief complaint  Patient is a 73y old  Female who presents with a chief complaint of Hypoxia, SOB (09 Aug 2019 17:43)   Review of systems  Patient in bed, looks comfortable, no fever,  had no hypoglycemia.    Labs and Fingersticks  CAPILLARY BLOOD GLUCOSE      POCT Blood Glucose.: 274 mg/dL (10 Aug 2019 11:37)  POCT Blood Glucose.: 187 mg/dL (10 Aug 2019 07:39)  POCT Blood Glucose.: 103 mg/dL (09 Aug 2019 21:09)  POCT Blood Glucose.: 160 mg/dL (09 Aug 2019 16:28)  POCT Blood Glucose.: 145 mg/dL (09 Aug 2019 11:52)      Anion Gap, Serum: 16 (08-10 @ 05:11)  Anion Gap, Serum: 16 (08-09 @ 06:06)      Calcium, Total Serum: 10.1 (08-10 @ 05:11)  Calcium, Total Serum: 10.4 (08-09 @ 06:06)          08-10    139  |  100  |  61<H>  ----------------------------<  156<H>  4.0   |  23  |  2.07<H>    Ca    10.1      10 Aug 2019 05:11  Mg     2.3     08-10                          9.6    8.7   )-----------( 305      ( 10 Aug 2019 05:11 )             29.1     Medications  MEDICATIONS  (STANDING):  acetaminophen  IVPB .. 1000 milliGRAM(s) IV Intermittent once  aspirin enteric coated 81 milliGRAM(s) Oral daily  atorvastatin 40 milliGRAM(s) Oral at bedtime  chlorhexidine 2% Cloths 1 Application(s) Topical daily  clopidogrel Tablet 75 milliGRAM(s) Oral daily  dextrose 5%. 1000 milliLiter(s) (50 mL/Hr) IV Continuous <Continuous>  dextrose 50% Injectable 12.5 Gram(s) IV Push once  dextrose 50% Injectable 25 Gram(s) IV Push once  dextrose 50% Injectable 25 Gram(s) IV Push once  docusate sodium 100 milliGRAM(s) Oral two times a day  furosemide   Injectable 60 milliGRAM(s) IV Push every 12 hours  heparin  Injectable 5000 Unit(s) SubCutaneous every 12 hours  hydrALAZINE 50 milliGRAM(s) Oral every 8 hours  insulin glargine Injectable (LANTUS) 84 Unit(s) SubCutaneous at bedtime  insulin lispro (HumaLOG) corrective regimen sliding scale   SubCutaneous three times a day before meals  insulin lispro (HumaLOG) corrective regimen sliding scale   SubCutaneous at bedtime  insulin lispro Injectable (HumaLOG) 38 Unit(s) SubCutaneous before breakfast  insulin lispro Injectable (HumaLOG) 38 Unit(s) SubCutaneous before lunch  insulin lispro Injectable (HumaLOG) 46 Unit(s) SubCutaneous before dinner  isosorbide   dinitrate Tablet (ISORDIL) 30 milliGRAM(s) Oral three times a day  levothyroxine 50 MICROGram(s) Oral daily  lidocaine   Patch 1 Patch Transdermal daily  melatonin 3 milliGRAM(s) Oral at bedtime  metoprolol succinate ER 25 milliGRAM(s) Oral daily  montelukast 10 milliGRAM(s) Oral daily  Nephro-enrico 1 Tablet(s) Oral daily  pantoprazole    Tablet 40 milliGRAM(s) Oral before breakfast  polyethylene glycol 3350 17 Gram(s) Oral daily  senna 2 Tablet(s) Oral at bedtime  spironolactone 25 milliGRAM(s) Oral daily      Physical Exam  General: Patient comfortable in bed  Vital Signs Last 12 Hrs  T(F): 97.7 (08-10-19 @ 11:28), Max: 98.1 (08-10-19 @ 04:22)  HR: 76 (08-10-19 @ 11:28) (73 - 76)  BP: 112/64 (08-10-19 @ 11:28) (106/60 - 112/64)  BP(mean): --  RR: 17 (08-10-19 @ 11:28) (17 - 18)  SpO2: 98% (08-10-19 @ 11:28) (98% - 98%)  Neck: No palpable thyroid nodules.  CVS: S1S2, No murmurs  Respiratory: No wheezing, no crepitations  GI: Abdomen soft, bowel sounds positive  Musculoskeletal:  edema lower extremities.   Skin: No skin rashes, no ecchymosis    Diagnostics

## 2019-08-10 NOTE — PROGRESS NOTE ADULT - SUBJECTIVE AND OBJECTIVE BOX
CHIEF COMPLAINT:  patient seen and examined her daughter at the bedside discussed with her at length about his safe discharge planning she still insisting  Her  mom should come home  SUBJECTIVE:     REVIEW OF SYSTEMS:the patient is still feeling weak    CONSTITUTIONAL: (x  )  weakness,  (  ) fevers or chills  EYES/ENT: (  )visual changes;     NECK: (  ) pain or stiffness  RESPIRATORY:   (  )cough, wheezing, hemoptysis;  (  x) shortness of breath  CARDIOVASCULAR:  (  )chest pain or palpitations  GASTROINTESTINAL:   (  )abdominal or epigastric pain.  (  ) nausea, vomiting, or hematemesis;   (   ) diarrhea or constipation.   GENITOURINARY:   (    ) dysuria, frequency or hematuria  NEUROLOGICAL:  (   ) numbness or weakness   All other review of systems is negative unless indicated above    Vital Signs Last 24 Hrs  T(C): 36.7 (10 Aug 2019 19:46), Max: 36.7 (10 Aug 2019 04:22)  T(F): 98.1 (10 Aug 2019 19:46), Max: 98.1 (10 Aug 2019 04:22)  HR: 79 (10 Aug 2019 19:46) (71 - 80)  BP: 118/61 (10 Aug 2019 19:46) (104/56 - 118/61)  BP(mean): --  RR: 18 (10 Aug 2019 19:46) (17 - 18)  SpO2: 96% (10 Aug 2019 19:46) (96% - 98%)    I&O's Summary    09 Aug 2019 07:01  -  10 Aug 2019 07:00  --------------------------------------------------------  IN: 680 mL / OUT: 1700 mL / NET: -1020 mL    10 Aug 2019 07:01  -  10 Aug 2019 20:17  --------------------------------------------------------  IN: 660 mL / OUT: 800 mL / NET: -140 mL        CAPILLARY BLOOD GLUCOSE      POCT Blood Glucose.: 263 mg/dL (10 Aug 2019 16:41)  POCT Blood Glucose.: 274 mg/dL (10 Aug 2019 11:37)  POCT Blood Glucose.: 187 mg/dL (10 Aug 2019 07:39)  POCT Blood Glucose.: 103 mg/dL (09 Aug 2019 21:09)      PHYSICAL EXAM:    Constitutional:  ( x  ) NAD,   ( x  )awake and alert  HEENT: PERR, EOMI,    Neck: Soft and supple, No LAD, No JVD  Respiratory:  (   x Breath sounds are clear bilaterally,      Cardiovascular:     (   )S1 and S2, regular rate and rhythm, no Murmurs, gallops or rubs  Gastrointestinal:  ( x  )Bowel Sounds present, soft,   (  )nontender, nondistended,    Extremities:    (  ) peripheral edema  Vascular: 2+ peripheral pulses  Neurological:    (  x  )A/O x 3,   (  ) focal deficits  Musculoskeletal:    ( x  )  normal strength b/l upper  (     ) normal  lower extremities  Skin: No rashes    MEDICATIONS:  MEDICATIONS  (STANDING):  acetaminophen  IVPB .. 1000 milliGRAM(s) IV Intermittent once  aspirin enteric coated 81 milliGRAM(s) Oral daily  atorvastatin 40 milliGRAM(s) Oral at bedtime  chlorhexidine 2% Cloths 1 Application(s) Topical daily  clopidogrel Tablet 75 milliGRAM(s) Oral daily  dextrose 5%. 1000 milliLiter(s) (50 mL/Hr) IV Continuous <Continuous>  dextrose 50% Injectable 12.5 Gram(s) IV Push once  dextrose 50% Injectable 25 Gram(s) IV Push once  dextrose 50% Injectable 25 Gram(s) IV Push once  docusate sodium 100 milliGRAM(s) Oral two times a day  furosemide   Injectable 60 milliGRAM(s) IV Push every 12 hours  heparin  Injectable 5000 Unit(s) SubCutaneous every 12 hours  hydrALAZINE 50 milliGRAM(s) Oral every 8 hours  insulin glargine Injectable (LANTUS) 84 Unit(s) SubCutaneous at bedtime  insulin lispro (HumaLOG) corrective regimen sliding scale   SubCutaneous three times a day before meals  insulin lispro (HumaLOG) corrective regimen sliding scale   SubCutaneous at bedtime  insulin lispro Injectable (HumaLOG) 38 Unit(s) SubCutaneous before breakfast  insulin lispro Injectable (HumaLOG) 38 Unit(s) SubCutaneous before lunch  insulin lispro Injectable (HumaLOG) 46 Unit(s) SubCutaneous before dinner  isosorbide   dinitrate Tablet (ISORDIL) 30 milliGRAM(s) Oral three times a day  levothyroxine 50 MICROGram(s) Oral daily  lidocaine   Patch 1 Patch Transdermal daily  melatonin 3 milliGRAM(s) Oral at bedtime  metoprolol succinate ER 25 milliGRAM(s) Oral daily  montelukast 10 milliGRAM(s) Oral daily  Nephro-enrico 1 Tablet(s) Oral daily  pantoprazole    Tablet 40 milliGRAM(s) Oral before breakfast  polyethylene glycol 3350 17 Gram(s) Oral daily  senna 2 Tablet(s) Oral at bedtime  spironolactone 25 milliGRAM(s) Oral daily      LABS: All Labs Reviewed:                        9.6    8.7   )-----------( 305      ( 10 Aug 2019 05:11 )             29.1     08-10    139  |  100  |  61<H>  ----------------------------<  156<H>  4.0   |  23  |  2.07<H>    Ca    10.1      10 Aug 2019 05:11  Mg     2.3     08-10            Blood Culture:   Urine Culture      RADIOLOGY/EKG:    ASSESSMENT AND PLAN:  74 yo woman with PMHx of HTN, T2DM (A1c 7.4%), CAD s/p CABG (LIMA to LAD, SVG to OM, SVG to PDA 2014 at [facility]), non-dilated ICM (EF 20-25%), severe mitral regurgitation s/p mitral clip (6/13/19 Dr. Newman), severe pulm HTN, CKD, hypothyroidism, who is presenting to ED after experiencing worsening SOB and intermittent chest pain for 1 week at [facility]. Of note, pt was recently discharged on 6/19 after having mitral clip procedure completed. She initially required BiPAP with improvement in her respiratory status following diuresis. Initiated on vasopressors and inotropes in CCU, which were subsequently weaned off. Infectious work up was negative.    #HFrEF likely 2/2 ICM with MR s/p alonso clip  - c/w Lasix 60 IV BID  May change to oral Demadex as before before discharge home   - Will likely switch back to torsemide 100 mg daily    -    - continue hydralazine 50 mg TID and isordil 30 TID  - continue spironolactone 25 mg  - continue Metoprolol Succinate 25 mg PO Daily   - No further cardiac testing at this time.      #CAD  - continue ASA and statin   - Pt with chest pain, but also with end stage cardiomyopathy, coronary disease, had MitraClip and no further intervention feasible. She should not have trops checked.    #SVT - likely Atrial Tach vs. Atrial Flutter   - Amio initially not given due to elevated LFTs likely in the setting of congestion.   - recheck LFTs.   - Monitor on tele.   - continue metoprolol at current dose as above  - If pt has SVT again can consider amio   #diabetes on  84   unit  Lantus and 	 and regular insulin 3 times a day at night she is receiving 46 units secondary to elevated hyperglycemia at bedtime she is using total of 206 units of insulin will ask Endo if can use Lantus at night and daytime 70/30 NPH so we will decrease her injection to only 3 times a day  discussed with NP on call and her daughter in detail        DVT PPX:    ADVANCED DIRECTIVE:    DISPOSITION: family requesting home but in my opinion she is the very high risk for readmission or intubation at home secondary to CHF/SVT/   DVT PPX:    ADVANCED DIRECTIVE:    DISPOSITION:

## 2019-08-10 NOTE — PROGRESS NOTE ADULT - ASSESSMENT
Pt is a 74 yo woman with PMHx of HTN, T2DM, CAD s/p CABG (LIMA to LAD, SVG to OM, SVG to PDA 2014 at [facility]), non-dilated ICM (EF 20-25%), severe mitral regurgitation s/p mitral clip (6/13/19 Dr. Newman), severe pulm HTN, CKD, hypothyroidism admitted with worsening SOB.    A/P:  MERVIN  Likely sec to cardiogenic shock  Renal function has improved to its baseline  Pt currently on lasix 60 IV BID and remains on oral Spironolactone   Continue diuretic therapy per cardiology.  Optimize glucose control  Monitor renal function   Avoid further nephrotoxics, NSAIDS, RCA    CKD stage 4:  baseline Scr 1.8-2.0. Scr stable at 1.88 today.   Renal function fluctuates sec to CHF  Monitor renal function at present    SOB:  in setting of HF. Improving on diuretic therapy.   Continue diuretics per cardiology    Acidosis   Sec to Renal failure  Improved and is currently at goal   Monitor serum Co2 at present    Hypokalemia:  in the setting of diuretic therapy. Serum potassium at goal after repletion  Goal potassium of 4 (due to cardiac hx)     Anemia:   Hb stable  Pt with iron deficiency anemia.

## 2019-08-11 LAB
ANION GAP SERPL CALC-SCNC: 16 MMOL/L — SIGNIFICANT CHANGE UP (ref 5–17)
BUN SERPL-MCNC: 59 MG/DL — HIGH (ref 7–23)
CALCIUM SERPL-MCNC: 9.7 MG/DL — SIGNIFICANT CHANGE UP (ref 8.4–10.5)
CHLORIDE SERPL-SCNC: 96 MMOL/L — SIGNIFICANT CHANGE UP (ref 96–108)
CO2 SERPL-SCNC: 25 MMOL/L — SIGNIFICANT CHANGE UP (ref 22–31)
CREAT SERPL-MCNC: 1.86 MG/DL — HIGH (ref 0.5–1.3)
GLUCOSE BLDC GLUCOMTR-MCNC: 272 MG/DL — HIGH (ref 70–99)
GLUCOSE BLDC GLUCOMTR-MCNC: 277 MG/DL — HIGH (ref 70–99)
GLUCOSE BLDC GLUCOMTR-MCNC: 286 MG/DL — HIGH (ref 70–99)
GLUCOSE BLDC GLUCOMTR-MCNC: 301 MG/DL — HIGH (ref 70–99)
GLUCOSE BLDC GLUCOMTR-MCNC: 387 MG/DL — HIGH (ref 70–99)
GLUCOSE SERPL-MCNC: 277 MG/DL — HIGH (ref 70–99)
POTASSIUM SERPL-MCNC: 3.9 MMOL/L — SIGNIFICANT CHANGE UP (ref 3.5–5.3)
POTASSIUM SERPL-SCNC: 3.9 MMOL/L — SIGNIFICANT CHANGE UP (ref 3.5–5.3)
SODIUM SERPL-SCNC: 137 MMOL/L — SIGNIFICANT CHANGE UP (ref 135–145)

## 2019-08-11 RX ORDER — INSULIN LISPRO 100/ML
42 VIAL (ML) SUBCUTANEOUS
Refills: 0 | Status: DISCONTINUED | OUTPATIENT
Start: 2019-08-11 | End: 2019-08-12

## 2019-08-11 RX ORDER — INSULIN GLARGINE 100 [IU]/ML
88 INJECTION, SOLUTION SUBCUTANEOUS AT BEDTIME
Refills: 0 | Status: DISCONTINUED | OUTPATIENT
Start: 2019-08-11 | End: 2019-08-12

## 2019-08-11 RX ADMIN — LIDOCAINE 1 PATCH: 4 CREAM TOPICAL at 06:46

## 2019-08-11 RX ADMIN — TRAMADOL HYDROCHLORIDE 25 MILLIGRAM(S): 50 TABLET ORAL at 08:02

## 2019-08-11 RX ADMIN — Medication 2: at 22:24

## 2019-08-11 RX ADMIN — INSULIN GLARGINE 88 UNIT(S): 100 INJECTION, SOLUTION SUBCUTANEOUS at 22:25

## 2019-08-11 RX ADMIN — Medication 50 MILLIGRAM(S): at 06:49

## 2019-08-11 RX ADMIN — Medication 42 UNIT(S): at 12:12

## 2019-08-11 RX ADMIN — Medication 50 MILLIGRAM(S): at 22:27

## 2019-08-11 RX ADMIN — MONTELUKAST 10 MILLIGRAM(S): 4 TABLET, CHEWABLE ORAL at 12:14

## 2019-08-11 RX ADMIN — PANTOPRAZOLE SODIUM 40 MILLIGRAM(S): 20 TABLET, DELAYED RELEASE ORAL at 06:49

## 2019-08-11 RX ADMIN — SPIRONOLACTONE 25 MILLIGRAM(S): 25 TABLET, FILM COATED ORAL at 06:48

## 2019-08-11 RX ADMIN — ISOSORBIDE DINITRATE 30 MILLIGRAM(S): 5 TABLET ORAL at 06:48

## 2019-08-11 RX ADMIN — TRAMADOL HYDROCHLORIDE 25 MILLIGRAM(S): 50 TABLET ORAL at 09:10

## 2019-08-11 RX ADMIN — CHLORHEXIDINE GLUCONATE 1 APPLICATION(S): 213 SOLUTION TOPICAL at 22:00

## 2019-08-11 RX ADMIN — Medication 650 MILLIGRAM(S): at 10:26

## 2019-08-11 RX ADMIN — ATORVASTATIN CALCIUM 40 MILLIGRAM(S): 80 TABLET, FILM COATED ORAL at 22:28

## 2019-08-11 RX ADMIN — Medication 3: at 08:02

## 2019-08-11 RX ADMIN — Medication 1 TABLET(S): at 12:14

## 2019-08-11 RX ADMIN — Medication 100 MILLIGRAM(S): at 17:00

## 2019-08-11 RX ADMIN — Medication 50 MILLIGRAM(S): at 14:11

## 2019-08-11 RX ADMIN — Medication 650 MILLIGRAM(S): at 09:12

## 2019-08-11 RX ADMIN — LIDOCAINE 1 PATCH: 4 CREAM TOPICAL at 10:27

## 2019-08-11 RX ADMIN — SENNA PLUS 2 TABLET(S): 8.6 TABLET ORAL at 22:28

## 2019-08-11 RX ADMIN — Medication 38 UNIT(S): at 08:02

## 2019-08-11 RX ADMIN — Medication 50 MICROGRAM(S): at 06:48

## 2019-08-11 RX ADMIN — LIDOCAINE 1 PATCH: 4 CREAM TOPICAL at 22:26

## 2019-08-11 RX ADMIN — Medication 3 MILLIGRAM(S): at 22:28

## 2019-08-11 RX ADMIN — CHLORHEXIDINE GLUCONATE 1 APPLICATION(S): 213 SOLUTION TOPICAL at 09:01

## 2019-08-11 RX ADMIN — CLOPIDOGREL BISULFATE 75 MILLIGRAM(S): 75 TABLET, FILM COATED ORAL at 12:14

## 2019-08-11 RX ADMIN — Medication 46 UNIT(S): at 17:01

## 2019-08-11 RX ADMIN — Medication 5: at 12:13

## 2019-08-11 RX ADMIN — Medication 3: at 17:02

## 2019-08-11 RX ADMIN — Medication 25 MILLIGRAM(S): at 06:48

## 2019-08-11 RX ADMIN — Medication 81 MILLIGRAM(S): at 12:14

## 2019-08-11 RX ADMIN — Medication 60 MILLIGRAM(S): at 17:00

## 2019-08-11 RX ADMIN — Medication 60 MILLIGRAM(S): at 06:49

## 2019-08-11 RX ADMIN — ISOSORBIDE DINITRATE 30 MILLIGRAM(S): 5 TABLET ORAL at 14:11

## 2019-08-11 NOTE — PROGRESS NOTE ADULT - SUBJECTIVE AND OBJECTIVE BOX
CHIEF COMPLAINT:    SUBJECTIVE:     REVIEW OF SYSTEMS:    CONSTITUTIONAL: (  )  weakness,  (  ) fevers or chills  EYES/ENT: (  )visual changes;     NECK: (  ) pain or stiffness  RESPIRATORY:   (  )cough, wheezing, hemoptysis;  (  ) shortness of breath  CARDIOVASCULAR:  (  )chest pain or palpitations  GASTROINTESTINAL:   (  )abdominal or epigastric pain.  (  ) nausea, vomiting, or hematemesis;   (   ) diarrhea or constipation.   GENITOURINARY:   (    ) dysuria, frequency or hematuria  NEUROLOGICAL:  (   ) numbness or weakness   All other review of systems is negative unless indicated above    Vital Signs Last 24 Hrs  T(C): 36.7 (11 Aug 2019 11:39), Max: 36.8 (11 Aug 2019 04:42)  T(F): 98 (11 Aug 2019 11:39), Max: 98.2 (11 Aug 2019 04:42)  HR: 82 (11 Aug 2019 17:02) (74 - 86)  BP: 104/51 (11 Aug 2019 17:02) (101/61 - 139/66)  BP(mean): --  RR: 18 (11 Aug 2019 11:39) (18 - 18)  SpO2: 96% (11 Aug 2019 11:39) (96% - 100%)    I&O's Summary    10 Aug 2019 07:01  -  11 Aug 2019 07:00  --------------------------------------------------------  IN: 660 mL / OUT: 1100 mL / NET: -440 mL    11 Aug 2019 07:01  -  11 Aug 2019 18:14  --------------------------------------------------------  IN: 480 mL / OUT: 700 mL / NET: -220 mL        CAPILLARY BLOOD GLUCOSE      POCT Blood Glucose.: 286 mg/dL (11 Aug 2019 16:27)  POCT Blood Glucose.: 387 mg/dL (11 Aug 2019 11:37)  POCT Blood Glucose.: 277 mg/dL (11 Aug 2019 07:47)  POCT Blood Glucose.: 333 mg/dL (10 Aug 2019 21:22)      PHYSICAL EXAM:    Constitutional:  (   ) NAD,   (   )awake and alert  HEENT: PERR, EOMI,    Neck: Soft and supple, No LAD, No JVD  Respiratory:  (    Breath sounds are clear bilaterally,    (   ) wheezing, rales or rhonchi  Cardiovascular:     (   )S1 and S2, regular rate and rhythm, no Murmurs, gallops or rubs  Gastrointestinal:  (   )Bowel Sounds present, soft,   (  )nontender, nondistended,    Extremities:    (  ) peripheral edema  Vascular: 2+ peripheral pulses  Neurological:    (    )A/O x 3,   (  ) focal deficits  Musculoskeletal:    (   )  normal strength b/l upper  (     ) normal  lower extremities  Skin: No rashes    MEDICATIONS:  MEDICATIONS  (STANDING):  acetaminophen  IVPB .. 1000 milliGRAM(s) IV Intermittent once  aspirin enteric coated 81 milliGRAM(s) Oral daily  atorvastatin 40 milliGRAM(s) Oral at bedtime  chlorhexidine 2% Cloths 1 Application(s) Topical daily  clopidogrel Tablet 75 milliGRAM(s) Oral daily  dextrose 5%. 1000 milliLiter(s) (50 mL/Hr) IV Continuous <Continuous>  dextrose 50% Injectable 12.5 Gram(s) IV Push once  dextrose 50% Injectable 25 Gram(s) IV Push once  dextrose 50% Injectable 25 Gram(s) IV Push once  docusate sodium 100 milliGRAM(s) Oral two times a day  furosemide   Injectable 60 milliGRAM(s) IV Push every 12 hours  heparin  Injectable 5000 Unit(s) SubCutaneous every 12 hours  hydrALAZINE 50 milliGRAM(s) Oral every 8 hours  insulin glargine Injectable (LANTUS) 88 Unit(s) SubCutaneous at bedtime  insulin lispro (HumaLOG) corrective regimen sliding scale   SubCutaneous three times a day before meals  insulin lispro (HumaLOG) corrective regimen sliding scale   SubCutaneous at bedtime  insulin lispro Injectable (HumaLOG) 42 Unit(s) SubCutaneous before lunch  insulin lispro Injectable (HumaLOG) 42 Unit(s) SubCutaneous before breakfast  insulin lispro Injectable (HumaLOG) 46 Unit(s) SubCutaneous before dinner  isosorbide   dinitrate Tablet (ISORDIL) 30 milliGRAM(s) Oral three times a day  levothyroxine 50 MICROGram(s) Oral daily  lidocaine   Patch 1 Patch Transdermal daily  melatonin 3 milliGRAM(s) Oral at bedtime  metoprolol succinate ER 25 milliGRAM(s) Oral daily  montelukast 10 milliGRAM(s) Oral daily  Nephro-enrico 1 Tablet(s) Oral daily  pantoprazole    Tablet 40 milliGRAM(s) Oral before breakfast  polyethylene glycol 3350 17 Gram(s) Oral daily  senna 2 Tablet(s) Oral at bedtime  spironolactone 25 milliGRAM(s) Oral daily      LABS: All Labs Reviewed:                        9.6    8.7   )-----------( 305      ( 10 Aug 2019 05:11 )             29.1     08-11    137  |  96  |  59<H>  ----------------------------<  277<H>  3.9   |  25  |  1.86<H>    Ca    9.7      11 Aug 2019 06:43  Mg     2.3     08-10            Blood Culture:   Urine Culture      RADIOLOGY/EKG:    ASSESSMENT AND PLAN:    DVT PPX:    ADVANCED DIRECTIVE:    DISPOSITION: CHIEF COMPLAINT:  patient seen and examined awake and verbal still complaining of weakness endocrinology follow-up appreciated her insulin adjusted secondary to hyperglycemia  SUBJECTIVE:     REVIEW OF SYSTEMS:    CONSTITUTIONAL: ( x )  weakness,  (  ) fevers or chills  EYES/ENT: (  )visual changes;     NECK: (  ) pain or stiffness  RESPIRATORY:   (  )cough, wheezing, hemoptysis;  ( x ) shortness of breath  CARDIOVASCULAR:  (  )chest pain or palpitations  GASTROINTESTINAL:   (  )abdominal or epigastric pain.  (  ) nausea, vomiting, or hematemesis;   (   ) diarrhea or constipation.   GENITOURINARY:   (    ) dysuria, frequency or hematuria  NEUROLOGICAL:  (   ) numbness or weakness   All other review of systems is negative unless indicated above    Vital Signs Last 24 Hrs  T(C): 36.7 (11 Aug 2019 11:39), Max: 36.8 (11 Aug 2019 04:42)  T(F): 98 (11 Aug 2019 11:39), Max: 98.2 (11 Aug 2019 04:42)  HR: 82 (11 Aug 2019 17:02) (74 - 86)  BP: 104/51 (11 Aug 2019 17:02) (101/61 - 139/66)  BP(mean): --  RR: 18 (11 Aug 2019 11:39) (18 - 18)  SpO2: 96% (11 Aug 2019 11:39) (96% - 100%)    I&O's Summary    10 Aug 2019 07:01  -  11 Aug 2019 07:00  --------------------------------------------------------  IN: 660 mL / OUT: 1100 mL / NET: -440 mL    11 Aug 2019 07:01  -  11 Aug 2019 18:14  --------------------------------------------------------  IN: 480 mL / OUT: 700 mL / NET: -220 mL        CAPILLARY BLOOD GLUCOSE      POCT Blood Glucose.: 286 mg/dL (11 Aug 2019 16:27)  POCT Blood Glucose.: 387 mg/dL (11 Aug 2019 11:37)  POCT Blood Glucose.: 277 mg/dL (11 Aug 2019 07:47)  POCT Blood Glucose.: 333 mg/dL (10 Aug 2019 21:22)      PHYSICAL EXAM:    Constitutional:  (  x ) NAD,   ( x  )awake and alert  HEENT: PERR, EOMI,    Neck: Soft and supple, No LAD, No JVD  Respiratory:  ( x   Breath sounds are clear bilaterally,    (   ) wheezing, rales or rhonchi  Cardiovascular:     ( x  )S1 and S2, regular rate and rhythm, no Murmurs, gallops or rubs  Gastrointestinal:  (  x )Bowel Sounds present, soft,   (  )nontender, nondistended,    Extremities:    (  ) peripheral edema  Vascular: 2+ peripheral pulses  Neurological:    ( x   )A/O x 3,   (  ) focal deficits  Musculoskeletal:    (   )  normal strength b/l upper  (     ) normal  lower extremities  Skin: No rashes    MEDICATIONS:  MEDICATIONS  (STANDING):  acetaminophen  IVPB .. 1000 milliGRAM(s) IV Intermittent once  aspirin enteric coated 81 milliGRAM(s) Oral daily  atorvastatin 40 milliGRAM(s) Oral at bedtime  chlorhexidine 2% Cloths 1 Application(s) Topical daily  clopidogrel Tablet 75 milliGRAM(s) Oral daily  dextrose 5%. 1000 milliLiter(s) (50 mL/Hr) IV Continuous <Continuous>  dextrose 50% Injectable 12.5 Gram(s) IV Push once  dextrose 50% Injectable 25 Gram(s) IV Push once  dextrose 50% Injectable 25 Gram(s) IV Push once  docusate sodium 100 milliGRAM(s) Oral two times a day  furosemide   Injectable 60 milliGRAM(s) IV Push every 12 hours  heparin  Injectable 5000 Unit(s) SubCutaneous every 12 hours  hydrALAZINE 50 milliGRAM(s) Oral every 8 hours  insulin glargine Injectable (LANTUS) 88 Unit(s) SubCutaneous at bedtime  insulin lispro (HumaLOG) corrective regimen sliding scale   SubCutaneous three times a day before meals  insulin lispro (HumaLOG) corrective regimen sliding scale   SubCutaneous at bedtime  insulin lispro Injectable (HumaLOG) 42 Unit(s) SubCutaneous before lunch  insulin lispro Injectable (HumaLOG) 42 Unit(s) SubCutaneous before breakfast  insulin lispro Injectable (HumaLOG) 46 Unit(s) SubCutaneous before dinner  isosorbide   dinitrate Tablet (ISORDIL) 30 milliGRAM(s) Oral three times a day  levothyroxine 50 MICROGram(s) Oral daily  lidocaine   Patch 1 Patch Transdermal daily  melatonin 3 milliGRAM(s) Oral at bedtime  metoprolol succinate ER 25 milliGRAM(s) Oral daily  montelukast 10 milliGRAM(s) Oral daily  Nephro-enrico 1 Tablet(s) Oral daily  pantoprazole    Tablet 40 milliGRAM(s) Oral before breakfast  polyethylene glycol 3350 17 Gram(s) Oral daily  senna 2 Tablet(s) Oral at bedtime  spironolactone 25 milliGRAM(s) Oral daily      LABS: All Labs Reviewed:                        9.6    8.7   )-----------( 305      ( 10 Aug 2019 05:11 )             29.1     08-11    137  |  96  |  59<H>  ----------------------------<  277<H>  3.9   |  25  |  1.86<H>    Ca    9.7      11 Aug 2019 06:43  Mg     2.3     08-10            Blood Culture:   Urine Culture      RADIOLOGY/EKG:    ASSESSMENT AND PLAN:  72 yo woman with PMHx of HTN, T2DM (A1c 7.4%), CAD s/p CABG (LIMA to LAD, SVG to OM, SVG to PDA 2014 at [facility]), non-dilated ICM (EF 20-25%), severe mitral regurgitation s/p mitral clip (6/13/19 Dr. Newman), severe pulm HTN, CKD, hypothyroidism, who is presenting to ED after experiencing worsening SOB and intermittent chest [facility]. Of note, pt was recently discharged on 6/19 after having mitral clip procedure completed. She initially required BiPAP with improvement in her respiratory status following diuresis. Initiated on vasopressors and inotropes in CCU, which were subsequently weaned off. Infectious work up was negative.    #HFrEF likely 2/2 ICM with MR s/p alonso clip  - c/w Lasix 60 IV BID  May change to oral Demadex as before before discharge home   - Will likely switch back to torsemide 100 mg daily    -    - continue hydralazine 50 mg TID and isordil 30 TID  - continue spironolactone 25 mg  - continue Metoprolol Succinate 25 mg PO Daily   - No further cardiac testing at this time.      #CAD  - continue ASA and statin   - Pt with chest pain, but also with end stage cardiomyopathy, coronary disease, had MitraClip and no further intervention feasible. She should not have trops checked.    #SVT - likely Atrial Tach vs. Atrial Flutter   - Amio initially not given due to elevated LFTs likely in the setting of congestion.   - recheck LFTs.   - Monitor on tele.   - continue metoprolol at current dose as above  - If pt has SVT again can consider amio   #diabetes on  88   unit  Lantus and 	 and regular insulin 3 times a day at night she is receiving 46 units secondary to elevated hyperglycemia at bedtime she is using total of 208 units of insulin will ask Endo if can use Lantus at night and daytime 70/30 NPH so we will decrease her injection to only 3 times a day  discussed with NP on call and her daughter in detail        DVT PPX:    ADVANCED DIRECTIVE:    DISPOSITION: family requesting home but in my opinion she is the very high risk for readmission or intubation at home secondary to CHF/SVT/brutal diabetes  DVT PPX:    ADVANCED DIRECTIVE:    DISPOSITION:

## 2019-08-11 NOTE — PROGRESS NOTE ADULT - SUBJECTIVE AND OBJECTIVE BOX
Dr. Muhammad  Office [phone]  Cell [phone]  [facility]  Cell [phone]      Patient is a 73y old  Female who presents with a chief complaint of Hypoxia, SOB (11 Aug 2019 11:42)      Patient seen and examined at bedside. No chest pain/sob    VITALS:  T(F): 98 (08-11-19 @ 11:39), Max: 98.2 (08-11-19 @ 04:42)  HR: 86 (08-11-19 @ 14:08)  BP: 139/66 (08-11-19 @ 14:08)  RR: 18 (08-11-19 @ 11:39)  SpO2: 96% (08-11-19 @ 11:39)  Wt(kg): --    08-10 @ 07:01  -  08-11 @ 07:00  --------------------------------------------------------  IN: 660 mL / OUT: 1100 mL / NET: -440 mL    08-11 @ 07:01  -  08-11 @ 14:32  --------------------------------------------------------  IN: 480 mL / OUT: 700 mL / NET: -220 mL          PHYSICAL EXAM:  Constitutional: NAD  Neck: No JVD  Respiratory: CTAB, no wheezes, rales or rhonchi  Cardiovascular: S1, S2, RRR  Gastrointestinal: BS+, soft, NT/ND  Extremities: No peripheral edema    Hospital Medications:   MEDICATIONS  (STANDING):  acetaminophen  IVPB .. 1000 milliGRAM(s) IV Intermittent once  aspirin enteric coated 81 milliGRAM(s) Oral daily  atorvastatin 40 milliGRAM(s) Oral at bedtime  chlorhexidine 2% Cloths 1 Application(s) Topical daily  clopidogrel Tablet 75 milliGRAM(s) Oral daily  dextrose 5%. 1000 milliLiter(s) (50 mL/Hr) IV Continuous <Continuous>  dextrose 50% Injectable 12.5 Gram(s) IV Push once  dextrose 50% Injectable 25 Gram(s) IV Push once  dextrose 50% Injectable 25 Gram(s) IV Push once  docusate sodium 100 milliGRAM(s) Oral two times a day  furosemide   Injectable 60 milliGRAM(s) IV Push every 12 hours  heparin  Injectable 5000 Unit(s) SubCutaneous every 12 hours  hydrALAZINE 50 milliGRAM(s) Oral every 8 hours  insulin glargine Injectable (LANTUS) 88 Unit(s) SubCutaneous at bedtime  insulin lispro (HumaLOG) corrective regimen sliding scale   SubCutaneous three times a day before meals  insulin lispro (HumaLOG) corrective regimen sliding scale   SubCutaneous at bedtime  insulin lispro Injectable (HumaLOG) 42 Unit(s) SubCutaneous before lunch  insulin lispro Injectable (HumaLOG) 42 Unit(s) SubCutaneous before breakfast  insulin lispro Injectable (HumaLOG) 46 Unit(s) SubCutaneous before dinner  isosorbide   dinitrate Tablet (ISORDIL) 30 milliGRAM(s) Oral three times a day  levothyroxine 50 MICROGram(s) Oral daily  lidocaine   Patch 1 Patch Transdermal daily  melatonin 3 milliGRAM(s) Oral at bedtime  metoprolol succinate ER 25 milliGRAM(s) Oral daily  montelukast 10 milliGRAM(s) Oral daily  Nephro-enrico 1 Tablet(s) Oral daily  pantoprazole    Tablet 40 milliGRAM(s) Oral before breakfast  polyethylene glycol 3350 17 Gram(s) Oral daily  senna 2 Tablet(s) Oral at bedtime  spironolactone 25 milliGRAM(s) Oral daily      LABS:  08-11    137  |  96  |  59<H>  ----------------------------<  277<H>  3.9   |  25  |  1.86<H>    Ca    9.7      11 Aug 2019 06:43  Mg     2.3     08-10      Creatinine Trend: 1.86 <--, 2.07 <--, 1.88 <--, 1.74 <--, 1.81 <--, 1.87 <--, 1.62 <--                                9.6    8.7   )-----------( 305      ( 10 Aug 2019 05:11 )             29.1     Urine Studies:      Iron 42, TIBC 348, %sat 12      [07-05-19 @ 22:32]  Ferritin 130      [07-05-19 @ 22:32]  .4 (Ca --)      [04-25-19 @ 05:45]   --  PTH 43.55 (Ca --)      [09-10-18 @ 07:15]   --  PTH 36.60 (Ca --)      [09-08-18 @ 03:15]   --  Vitamin D (25OH) 25.9      [05-01-19 @ 04:00]  HbA1c 7.4      [07-05-19 @ 22:32]  TSH 2.00      [07-05-19 @ 22:32]  Lipid: chol 148, , HDL 35,       [06-01-19 @ 08:00]        RADIOLOGY & ADDITIONAL STUDIES:

## 2019-08-11 NOTE — PROGRESS NOTE ADULT - ASSESSMENT
Assessment  DMT2: 73y Female with DM T2 with hyperglycemia on insulin, blood sugars runing high  187-333 yesterday, and 277  today  CAD: S/P cabg On medications, stable, monitored.  Hypothyroidism:  On synthroid 50  mcg po daily, asymptomatic.  HTN: Controlled, On med.  HLD; on statin  CKD: Monitor labs/BMP,         Plan:   DMT2:  Change   Lantus to 88 units qhs, and    Humalog to 42  units before breakfast and Lunch and 46 units pre-dinner   in addition to correction scale coverage, monitor FS will FU  Patient counseled for compliance with consistent low carb diet.  CAD: S/P cabg On medications, stable, monitored.  Hypothyroidism: tsh 2.0  On synthroid 50  mcg po daily, asymptomatic. F/U tsh as outpatient  HTN: Continue treatment, monitoring, Primary team FU  HLD; on statin  CKD: Continue monitoring labs, renal FU  Thank you for consult will F/U  erika Woodard MD  [phone]

## 2019-08-11 NOTE — PROGRESS NOTE ADULT - SUBJECTIVE AND OBJECTIVE BOX
Endocrinology Attending Covering for Dr. Banks      Chief complaint  Patient is a 73y old  Female who presents with a chief complaint of Hypoxia, SOB (10 Aug 2019 20:17)   Review of systems  Patient in bed, looks comfortable, no fever,  had no hypoglycemia.    Labs and Fingersticks  CAPILLARY BLOOD GLUCOSE      POCT Blood Glucose.: 387 mg/dL (11 Aug 2019 11:37)  POCT Blood Glucose.: 277 mg/dL (11 Aug 2019 07:47)  POCT Blood Glucose.: 333 mg/dL (10 Aug 2019 21:22)  POCT Blood Glucose.: 263 mg/dL (10 Aug 2019 16:41)      Anion Gap, Serum: 16 (08-11 @ 06:43)  Anion Gap, Serum: 16 (08-10 @ 05:11)      Calcium, Total Serum: 9.7 (08-11 @ 06:43)  Calcium, Total Serum: 10.1 (08-10 @ 05:11)          08-11    137  |  96  |  59<H>  ----------------------------<  277<H>  3.9   |  25  |  1.86<H>    Ca    9.7      11 Aug 2019 06:43  Mg     2.3     08-10                          9.6    8.7   )-----------( 305      ( 10 Aug 2019 05:11 )             29.1     Medications  MEDICATIONS  (STANDING):  acetaminophen  IVPB .. 1000 milliGRAM(s) IV Intermittent once  aspirin enteric coated 81 milliGRAM(s) Oral daily  atorvastatin 40 milliGRAM(s) Oral at bedtime  chlorhexidine 2% Cloths 1 Application(s) Topical daily  clopidogrel Tablet 75 milliGRAM(s) Oral daily  dextrose 5%. 1000 milliLiter(s) (50 mL/Hr) IV Continuous <Continuous>  dextrose 50% Injectable 12.5 Gram(s) IV Push once  dextrose 50% Injectable 25 Gram(s) IV Push once  dextrose 50% Injectable 25 Gram(s) IV Push once  docusate sodium 100 milliGRAM(s) Oral two times a day  furosemide   Injectable 60 milliGRAM(s) IV Push every 12 hours  heparin  Injectable 5000 Unit(s) SubCutaneous every 12 hours  hydrALAZINE 50 milliGRAM(s) Oral every 8 hours  insulin glargine Injectable (LANTUS) 88 Unit(s) SubCutaneous at bedtime  insulin lispro (HumaLOG) corrective regimen sliding scale   SubCutaneous three times a day before meals  insulin lispro (HumaLOG) corrective regimen sliding scale   SubCutaneous at bedtime  insulin lispro Injectable (HumaLOG) 42 Unit(s) SubCutaneous before lunch  insulin lispro Injectable (HumaLOG) 42 Unit(s) SubCutaneous before breakfast  insulin lispro Injectable (HumaLOG) 46 Unit(s) SubCutaneous before dinner  isosorbide   dinitrate Tablet (ISORDIL) 30 milliGRAM(s) Oral three times a day  levothyroxine 50 MICROGram(s) Oral daily  lidocaine   Patch 1 Patch Transdermal daily  melatonin 3 milliGRAM(s) Oral at bedtime  metoprolol succinate ER 25 milliGRAM(s) Oral daily  montelukast 10 milliGRAM(s) Oral daily  Nephro-enrico 1 Tablet(s) Oral daily  pantoprazole    Tablet 40 milliGRAM(s) Oral before breakfast  polyethylene glycol 3350 17 Gram(s) Oral daily  senna 2 Tablet(s) Oral at bedtime  spironolactone 25 milliGRAM(s) Oral daily      Physical Exam  General: Patient comfortable in bed  Vital Signs Last 12 Hrs  T(F): 98 (08-11-19 @ 11:39), Max: 98.2 (08-11-19 @ 04:42)  HR: 78 (08-11-19 @ 11:39) (74 - 78)  BP: 107/56 (08-11-19 @ 11:39) (101/61 - 123/63)  BP(mean): --  RR: 18 (08-11-19 @ 11:39) (18 - 18)  SpO2: 96% (08-11-19 @ 11:39) (96% - 100%)  Neck: No palpable thyroid nodules.  CVS: S1S2, No murmurs  Respiratory: No wheezing, no crepitations  GI: Abdomen soft, bowel sounds positive  Musculoskeletal:  edema lower extremities.   Skin: No skin rashes, no ecchymosis    Diagnostics

## 2019-08-12 LAB
ANION GAP SERPL CALC-SCNC: 17 MMOL/L — SIGNIFICANT CHANGE UP (ref 5–17)
BUN SERPL-MCNC: 56 MG/DL — HIGH (ref 7–23)
CALCIUM SERPL-MCNC: 9.8 MG/DL — SIGNIFICANT CHANGE UP (ref 8.4–10.5)
CHLORIDE SERPL-SCNC: 98 MMOL/L — SIGNIFICANT CHANGE UP (ref 96–108)
CO2 SERPL-SCNC: 23 MMOL/L — SIGNIFICANT CHANGE UP (ref 22–31)
CREAT SERPL-MCNC: 1.86 MG/DL — HIGH (ref 0.5–1.3)
GLUCOSE BLDC GLUCOMTR-MCNC: 201 MG/DL — HIGH (ref 70–99)
GLUCOSE BLDC GLUCOMTR-MCNC: 221 MG/DL — HIGH (ref 70–99)
GLUCOSE BLDC GLUCOMTR-MCNC: 229 MG/DL — HIGH (ref 70–99)
GLUCOSE BLDC GLUCOMTR-MCNC: 308 MG/DL — HIGH (ref 70–99)
GLUCOSE SERPL-MCNC: 177 MG/DL — HIGH (ref 70–99)
HCT VFR BLD CALC: 29.3 % — LOW (ref 34.5–45)
HGB BLD-MCNC: 9.5 G/DL — LOW (ref 11.5–15.5)
MCHC RBC-ENTMCNC: 28.6 PG — SIGNIFICANT CHANGE UP (ref 27–34)
MCHC RBC-ENTMCNC: 32.4 GM/DL — SIGNIFICANT CHANGE UP (ref 32–36)
MCV RBC AUTO: 88.3 FL — SIGNIFICANT CHANGE UP (ref 80–100)
PLATELET # BLD AUTO: 328 K/UL — SIGNIFICANT CHANGE UP (ref 150–400)
POTASSIUM SERPL-MCNC: 3.6 MMOL/L — SIGNIFICANT CHANGE UP (ref 3.5–5.3)
POTASSIUM SERPL-SCNC: 3.6 MMOL/L — SIGNIFICANT CHANGE UP (ref 3.5–5.3)
RBC # BLD: 3.32 M/UL — LOW (ref 3.8–5.2)
RBC # FLD: 16.3 % — HIGH (ref 10.3–14.5)
SODIUM SERPL-SCNC: 138 MMOL/L — SIGNIFICANT CHANGE UP (ref 135–145)
WBC # BLD: 9.8 K/UL — SIGNIFICANT CHANGE UP (ref 3.8–10.5)
WBC # FLD AUTO: 9.8 K/UL — SIGNIFICANT CHANGE UP (ref 3.8–10.5)

## 2019-08-12 RX ORDER — INSULIN LISPRO 100/ML
46 VIAL (ML) SUBCUTANEOUS
Refills: 0 | Status: DISCONTINUED | OUTPATIENT
Start: 2019-08-12 | End: 2019-08-15

## 2019-08-12 RX ORDER — INSULIN LISPRO 100/ML
42 VIAL (ML) SUBCUTANEOUS
Refills: 0 | Status: DISCONTINUED | OUTPATIENT
Start: 2019-08-12 | End: 2019-08-15

## 2019-08-12 RX ORDER — INSULIN GLARGINE 100 [IU]/ML
88 INJECTION, SOLUTION SUBCUTANEOUS AT BEDTIME
Refills: 0 | Status: DISCONTINUED | OUTPATIENT
Start: 2019-08-12 | End: 2019-08-15

## 2019-08-12 RX ADMIN — Medication 1 TABLET(S): at 11:26

## 2019-08-12 RX ADMIN — ATORVASTATIN CALCIUM 40 MILLIGRAM(S): 80 TABLET, FILM COATED ORAL at 21:40

## 2019-08-12 RX ADMIN — CLOPIDOGREL BISULFATE 75 MILLIGRAM(S): 75 TABLET, FILM COATED ORAL at 10:33

## 2019-08-12 RX ADMIN — Medication 25 MILLIGRAM(S): at 05:11

## 2019-08-12 RX ADMIN — Medication 2: at 07:57

## 2019-08-12 RX ADMIN — MONTELUKAST 10 MILLIGRAM(S): 4 TABLET, CHEWABLE ORAL at 10:33

## 2019-08-12 RX ADMIN — LIDOCAINE 1 PATCH: 4 CREAM TOPICAL at 08:08

## 2019-08-12 RX ADMIN — LIDOCAINE 1 PATCH: 4 CREAM TOPICAL at 10:00

## 2019-08-12 RX ADMIN — Medication 3 MILLIGRAM(S): at 21:40

## 2019-08-12 RX ADMIN — SPIRONOLACTONE 25 MILLIGRAM(S): 25 TABLET, FILM COATED ORAL at 05:11

## 2019-08-12 RX ADMIN — Medication 100 MILLIGRAM(S): at 16:48

## 2019-08-12 RX ADMIN — LIDOCAINE 1 PATCH: 4 CREAM TOPICAL at 21:39

## 2019-08-12 RX ADMIN — Medication 650 MILLIGRAM(S): at 08:53

## 2019-08-12 RX ADMIN — POLYETHYLENE GLYCOL 3350 17 GRAM(S): 17 POWDER, FOR SOLUTION ORAL at 10:33

## 2019-08-12 RX ADMIN — Medication 50 MILLIGRAM(S): at 21:39

## 2019-08-12 RX ADMIN — Medication 42 UNIT(S): at 12:29

## 2019-08-12 RX ADMIN — ISOSORBIDE DINITRATE 30 MILLIGRAM(S): 5 TABLET ORAL at 21:40

## 2019-08-12 RX ADMIN — ISOSORBIDE DINITRATE 30 MILLIGRAM(S): 5 TABLET ORAL at 13:43

## 2019-08-12 RX ADMIN — Medication 42 UNIT(S): at 07:58

## 2019-08-12 RX ADMIN — PANTOPRAZOLE SODIUM 40 MILLIGRAM(S): 20 TABLET, DELAYED RELEASE ORAL at 05:13

## 2019-08-12 RX ADMIN — HEPARIN SODIUM 5000 UNIT(S): 5000 INJECTION INTRAVENOUS; SUBCUTANEOUS at 05:12

## 2019-08-12 RX ADMIN — Medication 2: at 21:41

## 2019-08-12 RX ADMIN — Medication 46 UNIT(S): at 16:48

## 2019-08-12 RX ADMIN — Medication 81 MILLIGRAM(S): at 10:33

## 2019-08-12 RX ADMIN — Medication 2: at 16:48

## 2019-08-12 RX ADMIN — Medication 50 MICROGRAM(S): at 05:13

## 2019-08-12 RX ADMIN — ISOSORBIDE DINITRATE 30 MILLIGRAM(S): 5 TABLET ORAL at 00:11

## 2019-08-12 RX ADMIN — Medication 50 MILLIGRAM(S): at 13:43

## 2019-08-12 RX ADMIN — HEPARIN SODIUM 5000 UNIT(S): 5000 INJECTION INTRAVENOUS; SUBCUTANEOUS at 17:07

## 2019-08-12 RX ADMIN — Medication 650 MILLIGRAM(S): at 09:53

## 2019-08-12 RX ADMIN — INSULIN GLARGINE 88 UNIT(S): 100 INJECTION, SOLUTION SUBCUTANEOUS at 21:38

## 2019-08-12 RX ADMIN — Medication 60 MILLIGRAM(S): at 17:05

## 2019-08-12 RX ADMIN — ISOSORBIDE DINITRATE 30 MILLIGRAM(S): 5 TABLET ORAL at 05:11

## 2019-08-12 RX ADMIN — SENNA PLUS 2 TABLET(S): 8.6 TABLET ORAL at 21:40

## 2019-08-12 RX ADMIN — Medication 50 MILLIGRAM(S): at 05:12

## 2019-08-12 RX ADMIN — Medication 2: at 12:29

## 2019-08-12 RX ADMIN — Medication 60 MILLIGRAM(S): at 05:12

## 2019-08-12 RX ADMIN — CHLORHEXIDINE GLUCONATE 1 APPLICATION(S): 213 SOLUTION TOPICAL at 21:51

## 2019-08-12 NOTE — PROGRESS NOTE ADULT - SUBJECTIVE AND OBJECTIVE BOX
CHIEF COMPLAINT:    SUBJECTIVE:     REVIEW OF SYSTEMS:    CONSTITUTIONAL: (  )  weakness,  (  ) fevers or chills  EYES/ENT: (  )visual changes;     NECK: (  ) pain or stiffness  RESPIRATORY:   (  )cough, wheezing, hemoptysis;  (  ) shortness of breath  CARDIOVASCULAR:  (  )chest pain or palpitations  GASTROINTESTINAL:   (  )abdominal or epigastric pain.  (  ) nausea, vomiting, or hematemesis;   (   ) diarrhea or constipation.   GENITOURINARY:   (    ) dysuria, frequency or hematuria  NEUROLOGICAL:  (   ) numbness or weakness   All other review of systems is negative unless indicated above    Vital Signs Last 24 Hrs  T(C): 37 (12 Aug 2019 04:22), Max: 37 (12 Aug 2019 04:22)  T(F): 98.6 (12 Aug 2019 04:22), Max: 98.6 (12 Aug 2019 04:22)  HR: 77 (12 Aug 2019 04:22) (77 - 86)  BP: 107/61 (12 Aug 2019 04:22) (104/51 - 139/66)  BP(mean): --  RR: 18 (12 Aug 2019 04:22) (18 - 18)  SpO2: 96% (12 Aug 2019 04:22) (96% - 98%)    I&O's Summary    11 Aug 2019 07:01  -  12 Aug 2019 07:00  --------------------------------------------------------  IN: 680 mL / OUT: 1250 mL / NET: -570 mL        CAPILLARY BLOOD GLUCOSE      POCT Blood Glucose.: 201 mg/dL (12 Aug 2019 07:36)  POCT Blood Glucose.: 301 mg/dL (11 Aug 2019 22:22)  POCT Blood Glucose.: 272 mg/dL (11 Aug 2019 21:18)  POCT Blood Glucose.: 286 mg/dL (11 Aug 2019 16:27)  POCT Blood Glucose.: 387 mg/dL (11 Aug 2019 11:37)      PHYSICAL EXAM:    Constitutional:  (   ) NAD,   (   )awake and alert  HEENT: PERR, EOMI,    Neck: Soft and supple, No LAD, No JVD  Respiratory:  (    Breath sounds are clear bilaterally,    (   ) wheezing, rales or rhonchi  Cardiovascular:     (   )S1 and S2, regular rate and rhythm, no Murmurs, gallops or rubs  Gastrointestinal:  (   )Bowel Sounds present, soft,   (  )nontender, nondistended,    Extremities:    (  ) peripheral edema  Vascular: 2+ peripheral pulses  Neurological:    (    )A/O x 3,   (  ) focal deficits  Musculoskeletal:    (   )  normal strength b/l upper  (     ) normal  lower extremities  Skin: No rashes    MEDICATIONS:  MEDICATIONS  (STANDING):  acetaminophen  IVPB .. 1000 milliGRAM(s) IV Intermittent once  aspirin enteric coated 81 milliGRAM(s) Oral daily  atorvastatin 40 milliGRAM(s) Oral at bedtime  chlorhexidine 2% Cloths 1 Application(s) Topical daily  clopidogrel Tablet 75 milliGRAM(s) Oral daily  dextrose 5%. 1000 milliLiter(s) (50 mL/Hr) IV Continuous <Continuous>  dextrose 50% Injectable 12.5 Gram(s) IV Push once  dextrose 50% Injectable 25 Gram(s) IV Push once  dextrose 50% Injectable 25 Gram(s) IV Push once  docusate sodium 100 milliGRAM(s) Oral two times a day  furosemide   Injectable 60 milliGRAM(s) IV Push every 12 hours  heparin  Injectable 5000 Unit(s) SubCutaneous every 12 hours  hydrALAZINE 50 milliGRAM(s) Oral every 8 hours  insulin glargine Injectable (LANTUS) 88 Unit(s) SubCutaneous at bedtime  insulin lispro (HumaLOG) corrective regimen sliding scale   SubCutaneous three times a day before meals  insulin lispro (HumaLOG) corrective regimen sliding scale   SubCutaneous at bedtime  insulin lispro Injectable (HumaLOG) 42 Unit(s) SubCutaneous before lunch  insulin lispro Injectable (HumaLOG) 42 Unit(s) SubCutaneous before breakfast  insulin lispro Injectable (HumaLOG) 46 Unit(s) SubCutaneous before dinner  isosorbide   dinitrate Tablet (ISORDIL) 30 milliGRAM(s) Oral three times a day  levothyroxine 50 MICROGram(s) Oral daily  lidocaine   Patch 1 Patch Transdermal daily  melatonin 3 milliGRAM(s) Oral at bedtime  metoprolol succinate ER 25 milliGRAM(s) Oral daily  montelukast 10 milliGRAM(s) Oral daily  Nephro-enrico 1 Tablet(s) Oral daily  pantoprazole    Tablet 40 milliGRAM(s) Oral before breakfast  polyethylene glycol 3350 17 Gram(s) Oral daily  senna 2 Tablet(s) Oral at bedtime  spironolactone 25 milliGRAM(s) Oral daily      LABS: All Labs Reviewed:    08-12    138  |  98  |  56<H>  ----------------------------<  177<H>  3.6   |  23  |  1.86<H>    Ca    9.8      12 Aug 2019 06:13            Blood Culture:   Urine Culture      RADIOLOGY/EKG:    ASSESSMENT AND PLAN:    DVT PPX:    ADVANCED DIRECTIVE:    DISPOSITION: CHIEF COMPLAINT:  patient seen and examined awake and verbal still complaining of weakness  discussed with endocrinology follow-up appreciated her insulin adjusted secondary to hyperglycemia  SUBJECTIVE:     REVIEW OF SYSTEMS:    CONSTITUTIONAL: (x  )  weakness,  (  ) fevers or chills  EYES/ENT: (  )visual changes;     NECK: (  ) pain or stiffness  RESPIRATORY:   (  )cough, wheezing, hemoptysis;  (x  ) shortness of breath  CARDIOVASCULAR:  (  )chest pain or palpitations  GASTROINTESTINAL:   (  )abdominal or epigastric pain.  (  ) nausea, vomiting, or hematemesis;   (   ) diarrhea or constipation.   GENITOURINARY:   (    ) dysuria, frequency or hematuria  NEUROLOGICAL:  (   ) numbness or weakness   All other review of systems is negative unless indicated above    Vital Signs Last 24 Hrs  T(C): 37 (12 Aug 2019 04:22), Max: 37 (12 Aug 2019 04:22)  T(F): 98.6 (12 Aug 2019 04:22), Max: 98.6 (12 Aug 2019 04:22)  HR: 77 (12 Aug 2019 04:22) (77 - 86)  BP: 107/61 (12 Aug 2019 04:22) (104/51 - 139/66)  BP(mean): --  RR: 18 (12 Aug 2019 04:22) (18 - 18)  SpO2: 96% (12 Aug 2019 04:22) (96% - 98%)    I&O's Summary    11 Aug 2019 07:01  -  12 Aug 2019 07:00  --------------------------------------------------------  IN: 680 mL / OUT: 1250 mL / NET: -570 mL        CAPILLARY BLOOD GLUCOSE      POCT Blood Glucose.: 201 mg/dL (12 Aug 2019 07:36)  POCT Blood Glucose.: 301 mg/dL (11 Aug 2019 22:22)  POCT Blood Glucose.: 272 mg/dL (11 Aug 2019 21:18)  POCT Blood Glucose.: 286 mg/dL (11 Aug 2019 16:27)  POCT Blood Glucose.: 387 mg/dL (11 Aug 2019 11:37)      PHYSICAL EXAM:     Constitutional:  (  x ) NAD,   ( x  )awake and alert  HEENT: PERR, EOMI,    Neck: Soft and supple, No LAD, No JVD  Respiratory:  ( x   Breath sounds are clear bilaterally,    (   ) wheezing, rales or rhonchi  Cardiovascular:     ( x  )S1 and S2, regular rate and rhythm, no Murmurs, gallops or rubs  Gastrointestinal:  (  x )Bowel Sounds present, soft,   (  )nontender, nondistended,    Extremities:    (  ) peripheral edema  Vascular: 2+ peripheral pulses  Neurological:    ( x   )A/O x 3,   (  ) focal deficits  Musculoskeletal:    (   )  normal strength b/l upper  (     ) normal  lower extremities  Skin: No rashes    MEDICATIONS:  MEDICATIONS  (STANDING):  acetaminophen  IVPB .. 1000 milliGRAM(s) IV Intermittent once  aspirin enteric coated 81 milliGRAM(s) Oral daily  atorvastatin 40 milliGRAM(s) Oral at bedtime  chlorhexidine 2% Cloths 1 Application(s) Topical daily  clopidogrel Tablet 75 milliGRAM(s) Oral daily  dextrose 5%. 1000 milliLiter(s) (50 mL/Hr) IV Continuous <Continuous>  dextrose 50% Injectable 12.5 Gram(s) IV Push once  dextrose 50% Injectable 25 Gram(s) IV Push once  dextrose 50% Injectable 25 Gram(s) IV Push once  docusate sodium 100 milliGRAM(s) Oral two times a day  furosemide   Injectable 60 milliGRAM(s) IV Push every 12 hours  heparin  Injectable 5000 Unit(s) SubCutaneous every 12 hours  hydrALAZINE 50 milliGRAM(s) Oral every 8 hours  insulin glargine Injectable (LANTUS) 88 Unit(s) SubCutaneous at bedtime  insulin lispro (HumaLOG) corrective regimen sliding scale   SubCutaneous three times a day before meals  insulin lispro (HumaLOG) corrective regimen sliding scale   SubCutaneous at bedtime  insulin lispro Injectable (HumaLOG) 42 Unit(s) SubCutaneous before lunch  insulin lispro Injectable (HumaLOG) 42 Unit(s) SubCutaneous before breakfast  insulin lispro Injectable (HumaLOG) 46 Unit(s) SubCutaneous before dinner  isosorbide   dinitrate Tablet (ISORDIL) 30 milliGRAM(s) Oral three times a day  levothyroxine 50 MICROGram(s) Oral daily  lidocaine   Patch 1 Patch Transdermal daily  melatonin 3 milliGRAM(s) Oral at bedtime  metoprolol succinate ER 25 milliGRAM(s) Oral daily  montelukast 10 milliGRAM(s) Oral daily  Nephro-enrico 1 Tablet(s) Oral daily  pantoprazole    Tablet 40 milliGRAM(s) Oral before breakfast  polyethylene glycol 3350 17 Gram(s) Oral daily  senna 2 Tablet(s) Oral at bedtime  spironolactone 25 milliGRAM(s) Oral daily      LABS: All Labs Reviewed:    08-12    138  |  98  |  56<H>  ----------------------------<  177<H>  3.6   |  23  |  1.86<H>    Ca    9.8      12 Aug 2019 06:13            Blood Culture:   Urine Culture      RADIOLOGY/EKG:    ASSESSMENT AND PLAN:  74 yo woman with PMHx of HTN, T2DM (A1c 7.4%), CAD s/p CABG (LIMA to LAD, SVG to OM, SVG to PDA 2014 at [facility]), non-dilated ICM (EF 20-25%), severe mitral regurgitation s/p mitral clip (6/13/19 Dr. Newman), severe pulm HTN, CKD, hypothyroidism, who is presenting to ED after experiencing worsening SOB and intermittent chest pain for 1 week at [facility]. Of note, pt was recently discharged on 6/19 after having mitral clip procedure completed. She initially required BiPAP with improvement in her respiratory status following diuresis. Initiated on vasopressors and inotropes in CCU, which were subsequently weaned off. Infectious work up was negative.    #HFrEF likely 2/2 ICM with MR s/p alonso clip  - c/w Lasix 60 IV BID  May change to oral Demadex as before before discharge home   - Will likely switch back to torsemide 100 mg daily    -    - continue hydralazine 50 mg TID and isordil 30 TID  - continue spironolactone 25 mg  - continue Metoprolol Succinate 25 mg PO Daily   - No further cardiac testing at this time.      #CAD  - continue ASA and statin   - Pt with chest pain, but also with end stage cardiomyopathy, coronary disease, had MitraClip and no further intervention feasible. She should not have trops checked.    #SVT - likely Atrial Tach vs. Atrial Flutter   - Amio initially not given due to elevated LFTs likely in the setting of congestion.   - recheck LFTs.   - Monitor on tele.   - continue metoprolol at current dose as above  - If pt has SVT again can consider amio   #diabetes on  88   unit  Lantus and 	 and regular insulin 3 times a day at night she is receiving 46 units secondary to elevated hyperglycemia at bedtime she is using total of 208 units of insulin will ask Endo if can  her insulin can be adjusted for different kind and can be used only twice a day   discussed with NP            DVT PPX:    ADVANCED DIRECTIVE:    DISPOSITION:

## 2019-08-12 NOTE — PROGRESS NOTE ADULT - ASSESSMENT
Patient is a 73y old  Female admitted with Hypoxia, SOB, with exacerbation of neck pain.      Would continue tramadol prn, tylenol prn and lidoderm patch as well as hot packs.  Follow up outpatient with orthopedics and / or pain management.    Will sign off at this time, call to reconsult if necessary.        Minutes spent on total encounter: ten minutes          [facility]   [phone]

## 2019-08-12 NOTE — PROGRESS NOTE ADULT - ASSESSMENT
Pt is a 72 yo woman with PMHx of HTN, T2DM, CAD s/p CABG (LIMA to LAD, SVG to OM, SVG to PDA 2014 at [facility]), non-dilated ICM (EF 20-25%), severe mitral regurgitation s/p mitral clip (6/13/19 Dr. Newman), severe pulm HTN, CKD, hypothyroidism admitted with worsening SOB.    A/P:  MERVIN  Likely sec to cardiogenic shock  Renal function has improved to its baseline  Pt currently on lasix 60 IV BID and remains on oral Spironolactone   Continue diuretic therapy per cardiology.  Optimize glucose control  Monitor renal function   Avoid further nephrotoxics, NSAIDS, RCA    CKD stage 4:  baseline Scr 1.8-2.0. Scr stable at 1.86 today.   Renal function fluctuates sec to CHF  Monitor renal function at present    SOB:  in setting of HF. Improving on diuretic therapy.   Continue diuretics per cardiology    Acidosis   Sec to Renal failure  Improved and is currently at goal   Monitor serum Co2 at present    Hypokalemia:  in the setting of diuretic therapy.   Serum potassium better after repletion       Anemia:   Hb stable  Pt with iron deficiency anemia.

## 2019-08-12 NOTE — PROGRESS NOTE ADULT - SUBJECTIVE AND OBJECTIVE BOX
CHIEF COMPLAINT:  Patient is a 73y old  Female admitted with Hypoxia, SOB, with exacerbation of neck pain.    Interval HPI:      Pt is a 74 yo woman with PMHx of HTN, T2DM, CAD s/p CABG (LIMA to LAD, SVG to OM, SVG to PDA 2014 at [facility]), non-dilated ICM (EF 20-25%), severe mitral regurgitation s/p mitral clip (6/13/19 Dr. Newman), severe pulm HTN, CKD, hypothyroidism, who is presenting to ED [facility]. Also complaining of intermittent chest pain. Of note, pt was recently discharged on 6/19 after having mitral clip procedure completed.    Has a history of neck pain radiating to the left shoulder and arm with exacerbation now.      Pain Score: 6/10       Current in-patient pain therapy:  MEDICATIONS  (STANDING):  acetaminophen  IVPB .. 1000 milliGRAM(s) IV Intermittent once  aspirin enteric coated 81 milliGRAM(s) Oral daily  atorvastatin 40 milliGRAM(s) Oral at bedtime  chlorhexidine 2% Cloths 1 Application(s) Topical daily  clopidogrel Tablet 75 milliGRAM(s) Oral daily  dextrose 5%. 1000 milliLiter(s) (50 mL/Hr) IV Continuous <Continuous>  dextrose 50% Injectable 12.5 Gram(s) IV Push once  dextrose 50% Injectable 25 Gram(s) IV Push once  dextrose 50% Injectable 25 Gram(s) IV Push once  docusate sodium 100 milliGRAM(s) Oral two times a day  furosemide   Injectable 60 milliGRAM(s) IV Push every 12 hours  heparin  Injectable 5000 Unit(s) SubCutaneous every 12 hours  hydrALAZINE 50 milliGRAM(s) Oral every 8 hours  insulin glargine Injectable (LANTUS) 88 Unit(s) SubCutaneous at bedtime  insulin lispro (HumaLOG) corrective regimen sliding scale   SubCutaneous three times a day before meals  insulin lispro (HumaLOG) corrective regimen sliding scale   SubCutaneous at bedtime  insulin lispro Injectable (HumaLOG) 46 Unit(s) SubCutaneous before dinner  insulin lispro Injectable (HumaLOG) 42 Unit(s) SubCutaneous before lunch  insulin lispro Injectable (HumaLOG) 42 Unit(s) SubCutaneous before breakfast  isosorbide   dinitrate Tablet (ISORDIL) 30 milliGRAM(s) Oral three times a day  levothyroxine 50 MICROGram(s) Oral daily  lidocaine   Patch 1 Patch Transdermal daily  melatonin 3 milliGRAM(s) Oral at bedtime  metoprolol succinate ER 25 milliGRAM(s) Oral daily  montelukast 10 milliGRAM(s) Oral daily  Nephro-enrico 1 Tablet(s) Oral daily  pantoprazole    Tablet 40 milliGRAM(s) Oral before breakfast  polyethylene glycol 3350 17 Gram(s) Oral daily  senna 2 Tablet(s) Oral at bedtime  spironolactone 25 milliGRAM(s) Oral daily    MEDICATIONS  (PRN):  acetaminophen   Tablet .. 650 milliGRAM(s) Oral every 6 hours PRN Mild Pain (1 - 3), Moderate Pain (4 - 6)  dextrose 40% Gel 15 Gram(s) Oral once PRN Blood Glucose LESS THAN 70 milliGRAM(s)/deciliter  glucagon  Injectable 1 milliGRAM(s) IntraMuscular once PRN Glucose LESS THAN 70 milligrams/deciliter  traMADol 25 milliGRAM(s) Oral every 6 hours PRN Moderate Pain (4 - 6)      Allergies    azithromycin (Hives; Pruritus)    Intolerances    none       PHYSICAL EXAM:  Seen at bedside, awake and alert, NAD.  Has used a few doses of tramadol.  Continues to have neck and left shoulder pain, with complaints of radiation down left arm in a non-dermatomal fashion.    T(C): 36.8 (08-12-19 @ 11:30)  HR: 79 (08-12-19 @ 13:40)  BP: 103/56 (08-12-19 @ 13:40)  RR: 18 (08-12-19 @ 13:40)  SpO2: 95% (08-12-19 @ 13:40)         Pertinent labs, radiology, additional studies:  none reviewed.

## 2019-08-12 NOTE — CHART NOTE - NSCHARTNOTEFT_GEN_A_CORE
NP note- insulin recommendation by endo    Endo Dr. Woodard recommended to cancel current insulin regimen and switch then to U500 130unit before breakfast and 88units before dinner. spoke to house pharmacy. per pharm protocol, U500 can be initiated if pt is already on insulin pump. Dr. Woodard made aware of pharmacy protocol. will continue with current insulin and monitor f/s.     NP. Cole Bravo [id number]

## 2019-08-12 NOTE — PROGRESS NOTE ADULT - SUBJECTIVE AND OBJECTIVE BOX
Endocrinology Attending Covering for Dr. Banks      Chief complaint  Patient is a 73y old  Female who presents with a chief complaint of Hypoxia, SOB (12 Aug 2019 08:56)   Review of systems  Patient in bed, looks comfortable, no fever,  had no hypoglycemia.    Labs and Fingersticks  CAPILLARY BLOOD GLUCOSE      POCT Blood Glucose.: 201 mg/dL (12 Aug 2019 07:36)  POCT Blood Glucose.: 301 mg/dL (11 Aug 2019 22:22)  POCT Blood Glucose.: 272 mg/dL (11 Aug 2019 21:18)  POCT Blood Glucose.: 286 mg/dL (11 Aug 2019 16:27)  POCT Blood Glucose.: 387 mg/dL (11 Aug 2019 11:37)      Anion Gap, Serum: 17 (08-12 @ 06:13)  Anion Gap, Serum: 16 (08-11 @ 06:43)      Calcium, Total Serum: 9.8 (08-12 @ 06:13)  Calcium, Total Serum: 9.7 (08-11 @ 06:43)          08-12    138  |  98  |  56<H>  ----------------------------<  177<H>  3.6   |  23  |  1.86<H>    Ca    9.8      12 Aug 2019 06:13                          9.5    9.80  )-----------( 328      ( 12 Aug 2019 08:28 )             29.3     Medications  MEDICATIONS  (STANDING):  acetaminophen  IVPB .. 1000 milliGRAM(s) IV Intermittent once  aspirin enteric coated 81 milliGRAM(s) Oral daily  atorvastatin 40 milliGRAM(s) Oral at bedtime  chlorhexidine 2% Cloths 1 Application(s) Topical daily  clopidogrel Tablet 75 milliGRAM(s) Oral daily  dextrose 5%. 1000 milliLiter(s) (50 mL/Hr) IV Continuous <Continuous>  dextrose 50% Injectable 12.5 Gram(s) IV Push once  dextrose 50% Injectable 25 Gram(s) IV Push once  dextrose 50% Injectable 25 Gram(s) IV Push once  docusate sodium 100 milliGRAM(s) Oral two times a day  furosemide   Injectable 60 milliGRAM(s) IV Push every 12 hours  heparin  Injectable 5000 Unit(s) SubCutaneous every 12 hours  hydrALAZINE 50 milliGRAM(s) Oral every 8 hours  insulin glargine Injectable (LANTUS) 88 Unit(s) SubCutaneous at bedtime  insulin lispro (HumaLOG) corrective regimen sliding scale   SubCutaneous three times a day before meals  insulin lispro (HumaLOG) corrective regimen sliding scale   SubCutaneous at bedtime  insulin lispro Injectable (HumaLOG) 46 Unit(s) SubCutaneous before dinner  insulin lispro Injectable (HumaLOG) 42 Unit(s) SubCutaneous before lunch  insulin lispro Injectable (HumaLOG) 42 Unit(s) SubCutaneous before breakfast  isosorbide   dinitrate Tablet (ISORDIL) 30 milliGRAM(s) Oral three times a day  levothyroxine 50 MICROGram(s) Oral daily  lidocaine   Patch 1 Patch Transdermal daily  melatonin 3 milliGRAM(s) Oral at bedtime  metoprolol succinate ER 25 milliGRAM(s) Oral daily  montelukast 10 milliGRAM(s) Oral daily  Nephro-enrico 1 Tablet(s) Oral daily  pantoprazole    Tablet 40 milliGRAM(s) Oral before breakfast  polyethylene glycol 3350 17 Gram(s) Oral daily  senna 2 Tablet(s) Oral at bedtime  spironolactone 25 milliGRAM(s) Oral daily      Physical Exam  General: Patient comfortable in bed  Vital Signs Last 12 Hrs  T(F): 98.6 (08-12-19 @ 04:22), Max: 98.6 (08-12-19 @ 04:22)  HR: 77 (08-12-19 @ 04:22) (77 - 77)  BP: 107/61 (08-12-19 @ 04:22) (107/61 - 107/61)  BP(mean): --  RR: 18 (08-12-19 @ 04:22) (18 - 18)  SpO2: 96% (08-12-19 @ 04:22) (96% - 96%)  Neck: No palpable thyroid nodules.  CVS: S1S2, No murmurs  Respiratory: No wheezing, no crepitations  GI: Abdomen soft, bowel sounds positive  Musculoskeletal:  edema lower extremities.   Skin: No skin rashes, no ecchymosis    Diagnostics

## 2019-08-12 NOTE — PROGRESS NOTE ADULT - SUBJECTIVE AND OBJECTIVE BOX
[facility]   MD GONZALEZ SHAH D.O, PA    TELEPHONE NUMBERS:  OFFICE: [phone]  DR. SANTOYO CELL: [phone]  JUSTEN FIELD CELL: [phone]  DR. RIDER CELL:  [phone]    RENAL FOLLOW UP NOTE  --------------------------------------------------------------------------------  HPI: Pt seen and examined at bedside. Pt admits to weakness. Tolerating PO intake at bedside.   Denies SOB, chest pain     PAST HISTORY  --------------------------------------------------------------------------------  No significant changes to PMH, PSH, FHx, SHx, unless otherwise noted    ALLERGIES & MEDICATIONS  --------------------------------------------------------------------------------  Allergies    azithromycin (Hives; Pruritus)    Intolerances      Standing Inpatient Medications  acetaminophen  IVPB .. 1000 milliGRAM(s) IV Intermittent once  aspirin enteric coated 81 milliGRAM(s) Oral daily  atorvastatin 40 milliGRAM(s) Oral at bedtime  chlorhexidine 2% Cloths 1 Application(s) Topical daily  clopidogrel Tablet 75 milliGRAM(s) Oral daily  dextrose 5%. 1000 milliLiter(s) IV Continuous <Continuous>  dextrose 50% Injectable 12.5 Gram(s) IV Push once  dextrose 50% Injectable 25 Gram(s) IV Push once  dextrose 50% Injectable 25 Gram(s) IV Push once  docusate sodium 100 milliGRAM(s) Oral two times a day  furosemide   Injectable 60 milliGRAM(s) IV Push every 12 hours  heparin  Injectable 5000 Unit(s) SubCutaneous every 12 hours  hydrALAZINE 50 milliGRAM(s) Oral every 8 hours  insulin glargine Injectable (LANTUS) 88 Unit(s) SubCutaneous at bedtime  insulin lispro (HumaLOG) corrective regimen sliding scale   SubCutaneous three times a day before meals  insulin lispro (HumaLOG) corrective regimen sliding scale   SubCutaneous at bedtime  insulin lispro Injectable (HumaLOG) 42 Unit(s) SubCutaneous before lunch  insulin lispro Injectable (HumaLOG) 42 Unit(s) SubCutaneous before breakfast  insulin lispro Injectable (HumaLOG) 46 Unit(s) SubCutaneous before dinner  isosorbide   dinitrate Tablet (ISORDIL) 30 milliGRAM(s) Oral three times a day  levothyroxine 50 MICROGram(s) Oral daily  lidocaine   Patch 1 Patch Transdermal daily  melatonin 3 milliGRAM(s) Oral at bedtime  metoprolol succinate ER 25 milliGRAM(s) Oral daily  montelukast 10 milliGRAM(s) Oral daily  Nephro-enrico 1 Tablet(s) Oral daily  pantoprazole    Tablet 40 milliGRAM(s) Oral before breakfast  polyethylene glycol 3350 17 Gram(s) Oral daily  senna 2 Tablet(s) Oral at bedtime  spironolactone 25 milliGRAM(s) Oral daily    PRN Inpatient Medications  acetaminophen   Tablet .. 650 milliGRAM(s) Oral every 6 hours PRN  dextrose 40% Gel 15 Gram(s) Oral once PRN  glucagon  Injectable 1 milliGRAM(s) IntraMuscular once PRN  traMADol 25 milliGRAM(s) Oral every 6 hours PRN      REVIEW OF SYSTEMS  --------------------------------------------------------------------------------  General: no fever  CVS: no chest pain  RESP: no sob, no cough  ABD: no abdominal pain  : no dysuria,  MSK: no edema     VITALS/PHYSICAL EXAM  --------------------------------------------------------------------------------  T(C): 37 (08-12-19 @ 04:22), Max: 37 (08-12-19 @ 04:22)  HR: 77 (08-12-19 @ 04:22) (77 - 86)  BP: 107/61 (08-12-19 @ 04:22) (104/51 - 139/66)  RR: 18 (08-12-19 @ 04:22) (18 - 18)  SpO2: 96% (08-12-19 @ 04:22) (96% - 98%)  Wt(kg): --      08-11-19 @ 07:01  -  08-12-19 @ 07:00  --------------------------------------------------------  IN: 680 mL / OUT: 1250 mL / NET: -570 mL    Physical Exam:  	Gen: NAD  	HEENT: MONICA  	Pulm: Course BS B/L  	CV: S1S2  	Abd: Soft, +BS  	Ext: No LE edema B/L                      Neuro: Awake   	Skin: Warm and Dry     LABS/STUDIES  --------------------------------------------------------------------------------              9.5    9.80  >-----------<  328      [08-12-19 @ 08:28]              29.3     138  |  98  |  56  ----------------------------<  177      [08-12-19 @ 06:13]  3.6   |  23  |  1.86        Ca     9.8     [08-12-19 @ 06:13]    Creatinine Trend:  SCr 1.86 [08-12 @ 06:13]  SCr 1.86 [08-11 @ 06:43]  SCr 2.07 [08-10 @ 05:11]  SCr 1.88 [08-09 @ 06:06]  SCr 1.74 [08-08 @ 06:11]      Iron 42, TIBC 348, %sat 12      [07-05-19 @ 22:32]  Ferritin 130      [07-05-19 @ 22:32]  .4 (Ca --)      [04-25-19 @ 05:45]   --  PTH 43.55 (Ca --)      [09-10-18 @ 07:15]   --  PTH 36.60 (Ca --)      [09-08-18 @ 03:15]   --  Vitamin D (25OH) 25.9      [05-01-19 @ 04:00]  HbA1c 7.4      [07-05-19 @ 22:32]  TSH 2.00      [07-05-19 @ 22:32]  Lipid: chol 148, , HDL 35,       [06-01-19 @ 08:00]

## 2019-08-12 NOTE — PROGRESS NOTE ADULT - ASSESSMENT
Assessment  DMT2: 73y Female with DM T2 with hyperglycemia on insulin, blood sugars improving   201 today  CAD: S/P cabg On medications, stable, monitored.  Hypothyroidism:  On synthroid 50  mcg po daily, asymptomatic.  HTN: Controlled, On med.  HLD; on statin  CKD: Monitor labs/BMP,         Plan:   DMT2:  Change   Lantus to 88 units qhs, and    Humalog to 42  units before breakfast and Lunch and 46 units pre-dinner   in addition to correction scale coverage, monitor FS will FU  Patient counseled for compliance with consistent low carb diet.  Tried to switch to  U500 insulin, but pharmacy policy not allowing that   Plan for discharge will be U-500  ; 130 units 15 min prior to breakfast, and 90 units 15 min  pre-dinner  CAD: S/P cabg On medications, stable, monitored.  Hypothyroidism: tsh 2.0  On synthroid 50  mcg po daily, asymptomatic. F/U tsh as outpatient  HTN: Continue treatment, monitoring, Primary team FU  HLD; on statin  CKD: Continue monitoring labs, renal FU  Thank you for consult will F/U  erika Woodard MD  [phone]

## 2019-08-13 LAB
ANION GAP SERPL CALC-SCNC: 18 MMOL/L — HIGH (ref 5–17)
BUN SERPL-MCNC: 63 MG/DL — HIGH (ref 7–23)
CALCIUM SERPL-MCNC: 10 MG/DL — SIGNIFICANT CHANGE UP (ref 8.4–10.5)
CHLORIDE SERPL-SCNC: 97 MMOL/L — SIGNIFICANT CHANGE UP (ref 96–108)
CO2 SERPL-SCNC: 21 MMOL/L — LOW (ref 22–31)
CREAT SERPL-MCNC: 2.18 MG/DL — HIGH (ref 0.5–1.3)
GLUCOSE BLDC GLUCOMTR-MCNC: 235 MG/DL — HIGH (ref 70–99)
GLUCOSE BLDC GLUCOMTR-MCNC: 246 MG/DL — HIGH (ref 70–99)
GLUCOSE BLDC GLUCOMTR-MCNC: 328 MG/DL — HIGH (ref 70–99)
GLUCOSE BLDC GLUCOMTR-MCNC: 345 MG/DL — HIGH (ref 70–99)
GLUCOSE SERPL-MCNC: 328 MG/DL — HIGH (ref 70–99)
HCT VFR BLD CALC: 30.4 % — LOW (ref 34.5–45)
HGB BLD-MCNC: 9.7 G/DL — LOW (ref 11.5–15.5)
MCHC RBC-ENTMCNC: 28.4 PG — SIGNIFICANT CHANGE UP (ref 27–34)
MCHC RBC-ENTMCNC: 31.9 GM/DL — LOW (ref 32–36)
MCV RBC AUTO: 89.1 FL — SIGNIFICANT CHANGE UP (ref 80–100)
PLATELET # BLD AUTO: 321 K/UL — SIGNIFICANT CHANGE UP (ref 150–400)
POTASSIUM SERPL-MCNC: 3.8 MMOL/L — SIGNIFICANT CHANGE UP (ref 3.5–5.3)
POTASSIUM SERPL-SCNC: 3.8 MMOL/L — SIGNIFICANT CHANGE UP (ref 3.5–5.3)
RBC # BLD: 3.41 M/UL — LOW (ref 3.8–5.2)
RBC # FLD: 16.4 % — HIGH (ref 10.3–14.5)
SODIUM SERPL-SCNC: 136 MMOL/L — SIGNIFICANT CHANGE UP (ref 135–145)
WBC # BLD: 9.06 K/UL — SIGNIFICANT CHANGE UP (ref 3.8–10.5)
WBC # FLD AUTO: 9.06 K/UL — SIGNIFICANT CHANGE UP (ref 3.8–10.5)

## 2019-08-13 PROCEDURE — 99232 SBSQ HOSP IP/OBS MODERATE 35: CPT

## 2019-08-13 RX ORDER — BUMETANIDE 0.25 MG/ML
3 INJECTION INTRAMUSCULAR; INTRAVENOUS EVERY 12 HOURS
Refills: 0 | Status: DISCONTINUED | OUTPATIENT
Start: 2019-08-13 | End: 2019-09-05

## 2019-08-13 RX ADMIN — HEPARIN SODIUM 5000 UNIT(S): 5000 INJECTION INTRAVENOUS; SUBCUTANEOUS at 17:28

## 2019-08-13 RX ADMIN — CLOPIDOGREL BISULFATE 75 MILLIGRAM(S): 75 TABLET, FILM COATED ORAL at 11:23

## 2019-08-13 RX ADMIN — ISOSORBIDE DINITRATE 30 MILLIGRAM(S): 5 TABLET ORAL at 15:37

## 2019-08-13 RX ADMIN — Medication 81 MILLIGRAM(S): at 11:22

## 2019-08-13 RX ADMIN — BUMETANIDE 3 MILLIGRAM(S): 0.25 INJECTION INTRAMUSCULAR; INTRAVENOUS at 18:46

## 2019-08-13 RX ADMIN — Medication 2: at 16:49

## 2019-08-13 RX ADMIN — Medication 50 MICROGRAM(S): at 05:01

## 2019-08-13 RX ADMIN — ISOSORBIDE DINITRATE 30 MILLIGRAM(S): 5 TABLET ORAL at 21:35

## 2019-08-13 RX ADMIN — Medication 42 UNIT(S): at 08:00

## 2019-08-13 RX ADMIN — ATORVASTATIN CALCIUM 40 MILLIGRAM(S): 80 TABLET, FILM COATED ORAL at 21:35

## 2019-08-13 RX ADMIN — LIDOCAINE 1 PATCH: 4 CREAM TOPICAL at 08:38

## 2019-08-13 RX ADMIN — Medication 4: at 08:00

## 2019-08-13 RX ADMIN — LIDOCAINE 1 PATCH: 4 CREAM TOPICAL at 21:34

## 2019-08-13 RX ADMIN — ISOSORBIDE DINITRATE 30 MILLIGRAM(S): 5 TABLET ORAL at 05:01

## 2019-08-13 RX ADMIN — Medication 3 MILLIGRAM(S): at 21:35

## 2019-08-13 RX ADMIN — HEPARIN SODIUM 5000 UNIT(S): 5000 INJECTION INTRAVENOUS; SUBCUTANEOUS at 05:02

## 2019-08-13 RX ADMIN — Medication 42 UNIT(S): at 12:03

## 2019-08-13 RX ADMIN — PANTOPRAZOLE SODIUM 40 MILLIGRAM(S): 20 TABLET, DELAYED RELEASE ORAL at 06:12

## 2019-08-13 RX ADMIN — Medication 4: at 12:03

## 2019-08-13 RX ADMIN — MONTELUKAST 10 MILLIGRAM(S): 4 TABLET, CHEWABLE ORAL at 11:27

## 2019-08-13 RX ADMIN — LIDOCAINE 1 PATCH: 4 CREAM TOPICAL at 10:47

## 2019-08-13 RX ADMIN — Medication 1 TABLET(S): at 11:23

## 2019-08-13 RX ADMIN — Medication 50 MILLIGRAM(S): at 21:35

## 2019-08-13 RX ADMIN — Medication 60 MILLIGRAM(S): at 05:02

## 2019-08-13 RX ADMIN — Medication 46 UNIT(S): at 16:49

## 2019-08-13 RX ADMIN — SPIRONOLACTONE 25 MILLIGRAM(S): 25 TABLET, FILM COATED ORAL at 05:02

## 2019-08-13 RX ADMIN — Medication 50 MILLIGRAM(S): at 15:38

## 2019-08-13 RX ADMIN — Medication 50 MILLIGRAM(S): at 05:01

## 2019-08-13 RX ADMIN — Medication 100 MILLIGRAM(S): at 05:01

## 2019-08-13 RX ADMIN — Medication 25 MILLIGRAM(S): at 06:12

## 2019-08-13 RX ADMIN — INSULIN GLARGINE 88 UNIT(S): 100 INJECTION, SOLUTION SUBCUTANEOUS at 21:34

## 2019-08-13 NOTE — PROGRESS NOTE ADULT - ASSESSMENT
Assessment  DMT2: 73y Female with DM T2 with hyperglycemia on insulin, blood sugars still hig   345  today  CAD: S/P cabg On medications, stable, monitored.  Hypothyroidism:  On synthroid 50  mcg po daily, asymptomatic.  HTN: Controlled, On med.  HLD; on statin  CKD: Monitor labs/BMP,         Plan:   DMT2:   Will split the dose of  LAntus to 45 units in AM, and 56 units in PM     Humalog to 42  units before breakfast and Lunch and 46 units pre-dinner   in addition to correction scale coverage, monitor FS will FU  Patient counseled for compliance with consistent low carb diet.  Tried to switch to  U500 insulin, but pharmacy policy not allowing that   Plan for discharge will be U-500  ; 130 units 15 min prior to breakfast, and 90 units 15 min  pre-dinner  CAD: S/P cabg On medications, stable, monitored.  Hypothyroidism: tsh 2.0  On synthroid 50  mcg po daily, asymptomatic. F/U tsh as outpatient  HTN: Continue treatment, monitoring, Primary team FU  HLD; on statin  CKD: Continue monitoring labs, renal FU  Case discussed with the NP  erika Woodard MD  [phone]

## 2019-08-13 NOTE — PROGRESS NOTE ADULT - SUBJECTIVE AND OBJECTIVE BOX
Endocrinology Attending Covering for Dr. Banks      Chief complaint  Patient is a 73y old  Female who presents with a chief complaint of Hypoxia, SOB (13 Aug 2019 09:38)   Review of systems  Patient in bed, looks comfortable, no fever,  had no hypoglycemia.    Labs and Fingersticks  CAPILLARY BLOOD GLUCOSE      POCT Blood Glucose.: 345 mg/dL (13 Aug 2019 07:37)  POCT Blood Glucose.: 308 mg/dL (12 Aug 2019 21:10)  POCT Blood Glucose.: 229 mg/dL (12 Aug 2019 16:36)  POCT Blood Glucose.: 221 mg/dL (12 Aug 2019 12:08)      Anion Gap, Serum: 18 <H> (08-13 @ 06:18)  Anion Gap, Serum: 17 (08-12 @ 06:13)      Calcium, Total Serum: 10.0 (08-13 @ 06:18)  Calcium, Total Serum: 9.8 (08-12 @ 06:13)          08-13    136  |  97  |  63<H>  ----------------------------<  328<H>  3.8   |  21<L>  |  2.18<H>    Ca    10.0      13 Aug 2019 06:18                          9.7    9.06  )-----------( 321      ( 13 Aug 2019 08:42 )             30.4     Medications  MEDICATIONS  (STANDING):  acetaminophen  IVPB .. 1000 milliGRAM(s) IV Intermittent once  aspirin enteric coated 81 milliGRAM(s) Oral daily  atorvastatin 40 milliGRAM(s) Oral at bedtime  buMETAnide 3 milliGRAM(s) Oral every 12 hours  chlorhexidine 2% Cloths 1 Application(s) Topical daily  clopidogrel Tablet 75 milliGRAM(s) Oral daily  dextrose 5%. 1000 milliLiter(s) (50 mL/Hr) IV Continuous <Continuous>  dextrose 50% Injectable 12.5 Gram(s) IV Push once  dextrose 50% Injectable 25 Gram(s) IV Push once  dextrose 50% Injectable 25 Gram(s) IV Push once  docusate sodium 100 milliGRAM(s) Oral two times a day  heparin  Injectable 5000 Unit(s) SubCutaneous every 12 hours  hydrALAZINE 50 milliGRAM(s) Oral every 8 hours  insulin glargine Injectable (LANTUS) 88 Unit(s) SubCutaneous at bedtime  insulin lispro (HumaLOG) corrective regimen sliding scale   SubCutaneous three times a day before meals  insulin lispro (HumaLOG) corrective regimen sliding scale   SubCutaneous at bedtime  insulin lispro Injectable (HumaLOG) 46 Unit(s) SubCutaneous before dinner  insulin lispro Injectable (HumaLOG) 42 Unit(s) SubCutaneous before lunch  insulin lispro Injectable (HumaLOG) 42 Unit(s) SubCutaneous before breakfast  isosorbide   dinitrate Tablet (ISORDIL) 30 milliGRAM(s) Oral three times a day  levothyroxine 50 MICROGram(s) Oral daily  lidocaine   Patch 1 Patch Transdermal daily  melatonin 3 milliGRAM(s) Oral at bedtime  metoprolol succinate ER 25 milliGRAM(s) Oral daily  montelukast 10 milliGRAM(s) Oral daily  Nephro-enrico 1 Tablet(s) Oral daily  pantoprazole    Tablet 40 milliGRAM(s) Oral before breakfast  polyethylene glycol 3350 17 Gram(s) Oral daily  senna 2 Tablet(s) Oral at bedtime  spironolactone 25 milliGRAM(s) Oral daily      Physical Exam  General: Patient comfortable in bed  Vital Signs Last 12 Hrs  T(F): 98.5 (08-13-19 @ 04:13), Max: 98.5 (08-13-19 @ 04:13)  HR: 79 (08-13-19 @ 06:05) (79 - 81)  BP: 100/57 (08-13-19 @ 06:05) (100/57 - 106/56)  BP(mean): --  RR: 18 (08-13-19 @ 04:13) (18 - 18)  SpO2: 98% (08-13-19 @ 04:13) (98% - 98%)  Neck: No palpable thyroid nodules.  CVS: S1S2, No murmurs  Respiratory: No wheezing, no crepitations  GI: Abdomen soft, bowel sounds positive  Musculoskeletal:  edema lower extremities.   Skin: No skin rashes, no ecchymosis    Diagnostics

## 2019-08-13 NOTE — PROGRESS NOTE ADULT - SUBJECTIVE AND OBJECTIVE BOX
CHIEF COMPLAINT:    SUBJECTIVE:     REVIEW OF SYSTEMS:    CONSTITUTIONAL: (  )  weakness,  (  ) fevers or chills  EYES/ENT: (  )visual changes;     NECK: (  ) pain or stiffness  RESPIRATORY:   (  )cough, wheezing, hemoptysis;  (  ) shortness of breath  CARDIOVASCULAR:  (  )chest pain or palpitations  GASTROINTESTINAL:   (  )abdominal or epigastric pain.  (  ) nausea, vomiting, or hematemesis;   (   ) diarrhea or constipation.   GENITOURINARY:   (    ) dysuria, frequency or hematuria  NEUROLOGICAL:  (   ) numbness or weakness   All other review of systems is negative unless indicated above    Vital Signs Last 24 Hrs  T(C): 36.9 (13 Aug 2019 04:13), Max: 36.9 (13 Aug 2019 04:13)  T(F): 98.5 (13 Aug 2019 04:13), Max: 98.5 (13 Aug 2019 04:13)  HR: 79 (13 Aug 2019 06:05) (74 - 84)  BP: 100/57 (13 Aug 2019 06:05) (100/57 - 122/58)  BP(mean): --  RR: 18 (13 Aug 2019 04:13) (17 - 18)  SpO2: 98% (13 Aug 2019 04:13) (95% - 98%)    I&O's Summary    12 Aug 2019 07:01  -  13 Aug 2019 07:00  --------------------------------------------------------  IN: 700 mL / OUT: 1000 mL / NET: -300 mL        CAPILLARY BLOOD GLUCOSE      POCT Blood Glucose.: 345 mg/dL (13 Aug 2019 07:37)  POCT Blood Glucose.: 308 mg/dL (12 Aug 2019 21:10)  POCT Blood Glucose.: 229 mg/dL (12 Aug 2019 16:36)  POCT Blood Glucose.: 221 mg/dL (12 Aug 2019 12:08)      PHYSICAL EXAM:    Constitutional:  (   ) NAD,   (   )awake and alert  HEENT: PERR, EOMI,    Neck: Soft and supple, No LAD, No JVD  Respiratory:  (    Breath sounds are clear bilaterally,    (   ) wheezing, rales or rhonchi  Cardiovascular:     (   )S1 and S2, regular rate and rhythm, no Murmurs, gallops or rubs  Gastrointestinal:  (   )Bowel Sounds present, soft,   (  )nontender, nondistended,    Extremities:    (  ) peripheral edema  Vascular: 2+ peripheral pulses  Neurological:    (    )A/O x 3,   (  ) focal deficits  Musculoskeletal:    (   )  normal strength b/l upper  (     ) normal  lower extremities  Skin: No rashes    MEDICATIONS:  MEDICATIONS  (STANDING):  acetaminophen  IVPB .. 1000 milliGRAM(s) IV Intermittent once  aspirin enteric coated 81 milliGRAM(s) Oral daily  atorvastatin 40 milliGRAM(s) Oral at bedtime  chlorhexidine 2% Cloths 1 Application(s) Topical daily  clopidogrel Tablet 75 milliGRAM(s) Oral daily  dextrose 5%. 1000 milliLiter(s) (50 mL/Hr) IV Continuous <Continuous>  dextrose 50% Injectable 12.5 Gram(s) IV Push once  dextrose 50% Injectable 25 Gram(s) IV Push once  dextrose 50% Injectable 25 Gram(s) IV Push once  docusate sodium 100 milliGRAM(s) Oral two times a day  furosemide   Injectable 60 milliGRAM(s) IV Push every 12 hours  heparin  Injectable 5000 Unit(s) SubCutaneous every 12 hours  hydrALAZINE 50 milliGRAM(s) Oral every 8 hours  insulin glargine Injectable (LANTUS) 88 Unit(s) SubCutaneous at bedtime  insulin lispro (HumaLOG) corrective regimen sliding scale   SubCutaneous three times a day before meals  insulin lispro (HumaLOG) corrective regimen sliding scale   SubCutaneous at bedtime  insulin lispro Injectable (HumaLOG) 46 Unit(s) SubCutaneous before dinner  insulin lispro Injectable (HumaLOG) 42 Unit(s) SubCutaneous before lunch  insulin lispro Injectable (HumaLOG) 42 Unit(s) SubCutaneous before breakfast  isosorbide   dinitrate Tablet (ISORDIL) 30 milliGRAM(s) Oral three times a day  levothyroxine 50 MICROGram(s) Oral daily  lidocaine   Patch 1 Patch Transdermal daily  melatonin 3 milliGRAM(s) Oral at bedtime  metoprolol succinate ER 25 milliGRAM(s) Oral daily  montelukast 10 milliGRAM(s) Oral daily  Nephro-enrico 1 Tablet(s) Oral daily  pantoprazole    Tablet 40 milliGRAM(s) Oral before breakfast  polyethylene glycol 3350 17 Gram(s) Oral daily  senna 2 Tablet(s) Oral at bedtime  spironolactone 25 milliGRAM(s) Oral daily      LABS: All Labs Reviewed:                        9.7    9.06  )-----------( 321      ( 13 Aug 2019 08:42 )             30.4     08-13    136  |  97  |  63<H>  ----------------------------<  328<H>  3.8   |  21<L>  |  2.18<H>    Ca    10.0      13 Aug 2019 06:18            Blood Culture:   Urine Culture      RADIOLOGY/EKG:    ASSESSMENT AND PLAN:    DVT PPX:    ADVANCED DIRECTIVE:    DISPOSITION: CHIEF COMPLAINT:  patient seen this a.m. she was trying to ambulate with PCA for a few steps as per discharge planning team she was walking in the hallway  SUBJECTIVE:     REVIEW OF SYSTEMS:    CONSTITUTIONAL: ( x )  weakness,  (  ) fevers or chills  EYES/ENT: (  )visual changes;     NECK: (  ) pain or stiffness  RESPIRATORY:   (  )cough, wheezing, hemoptysis;  ( x ) shortness of breath  CARDIOVASCULAR:  (  )chest pain or palpitations  GASTROINTESTINAL:   (  )abdominal or epigastric pain.  (  ) nausea, vomiting, or hematemesis;   (   ) diarrhea or constipation.   GENITOURINARY:   (    ) dysuria, frequency or hematuria  NEUROLOGICAL:  (   ) numbness or weakness   All other review of systems is negative unless indicated above    Vital Signs Last 24 Hrs  T(C): 36.9 (13 Aug 2019 04:13), Max: 36.9 (13 Aug 2019 04:13)  T(F): 98.5 (13 Aug 2019 04:13), Max: 98.5 (13 Aug 2019 04:13)  HR: 79 (13 Aug 2019 06:05) (74 - 84)  BP: 100/57 (13 Aug 2019 06:05) (100/57 - 122/58)  BP(mean): --  RR: 18 (13 Aug 2019 04:13) (17 - 18)  SpO2: 98% (13 Aug 2019 04:13) (95% - 98%)    I&O's Summary    12 Aug 2019 07:01  -  13 Aug 2019 07:00  --------------------------------------------------------  IN: 700 mL / OUT: 1000 mL / NET: -300 mL        CAPILLARY BLOOD GLUCOSE      POCT Blood Glucose.: 345 mg/dL (13 Aug 2019 07:37)  POCT Blood Glucose.: 308 mg/dL (12 Aug 2019 21:10)  POCT Blood Glucose.: 229 mg/dL (12 Aug 2019 16:36)  POCT Blood Glucose.: 221 mg/dL (12 Aug 2019 12:08)      PHYSICAL EXAM:      Constitutional:  (  x ) NAD,   ( x  )awake and alert  HEENT: PERR, EOMI,    Neck: Soft and supple, No LAD, No JVD  Respiratory:  ( x   Breath sounds are clear bilaterally,    (   ) wheezing, rales or rhonchi  Cardiovascular:     ( x  )S1 and S2, regular rate and rhythm, no Murmurs, gallops or rubs  Gastrointestinal:  (  x )Bowel Sounds present, soft,   (  )nontender, nondistended,    Extremities:    (  ) peripheral edema  Vascular: 2+ peripheral pulses  Neurological:    ( x   )A/O x 3,   (  ) focal deficits  Musculoskeletal:    (   )  normal strength b/l upper  (     ) normal  lower extremities  Skin: No rashes      MEDICATIONS:  MEDICATIONS  (STANDING):  acetaminophen  IVPB .. 1000 milliGRAM(s) IV Intermittent once  aspirin enteric coated 81 milliGRAM(s) Oral daily  atorvastatin 40 milliGRAM(s) Oral at bedtime  chlorhexidine 2% Cloths 1 Application(s) Topical daily  clopidogrel Tablet 75 milliGRAM(s) Oral daily  dextrose 5%. 1000 milliLiter(s) (50 mL/Hr) IV Continuous <Continuous>  dextrose 50% Injectable 12.5 Gram(s) IV Push once  dextrose 50% Injectable 25 Gram(s) IV Push once  dextrose 50% Injectable 25 Gram(s) IV Push once  docusate sodium 100 milliGRAM(s) Oral two times a day  furosemide   Injectable 60 milliGRAM(s) IV Push every 12 hours  heparin  Injectable 5000 Unit(s) SubCutaneous every 12 hours  hydrALAZINE 50 milliGRAM(s) Oral every 8 hours  insulin glargine Injectable (LANTUS) 88 Unit(s) SubCutaneous at bedtime  insulin lispro (HumaLOG) corrective regimen sliding scale   SubCutaneous three times a day before meals  insulin lispro (HumaLOG) corrective regimen sliding scale   SubCutaneous at bedtime  insulin lispro Injectable (HumaLOG) 46 Unit(s) SubCutaneous before dinner  insulin lispro Injectable (HumaLOG) 42 Unit(s) SubCutaneous before lunch  insulin lispro Injectable (HumaLOG) 42 Unit(s) SubCutaneous before breakfast  isosorbide   dinitrate Tablet (ISORDIL) 30 milliGRAM(s) Oral three times a day  levothyroxine 50 MICROGram(s) Oral daily  lidocaine   Patch 1 Patch Transdermal daily  melatonin 3 milliGRAM(s) Oral at bedtime  metoprolol succinate ER 25 milliGRAM(s) Oral daily  montelukast 10 milliGRAM(s) Oral daily  Nephro-enrico 1 Tablet(s) Oral daily  pantoprazole    Tablet 40 milliGRAM(s) Oral before breakfast  polyethylene glycol 3350 17 Gram(s) Oral daily  senna 2 Tablet(s) Oral at bedtime  spironolactone 25 milliGRAM(s) Oral daily      LABS: All Labs Reviewed:                        9.7    9.06  )-----------( 321      ( 13 Aug 2019 08:42 )             30.4     08-13    136  |  97  |  63<H>  ----------------------------<  328<H>  3.8   |  21<L>  |  2.18<H>    Ca    10.0      13 Aug 2019 06:18            Blood Culture:   Urine Culture      RADIOLOGY/EKG:    ASSESSMENT AND PLAN:  72 yo woman with PMHx of HTN, T2DM (A1c 7.4%), CAD s/p CABG (LIMA to LAD, SVG to OM, SVG to PDA 2014 at [facility]), non-dilated ICM (EF 20-25%), severe mitral regurgitation s/p mitral clip (6/13/19 Dr. Newman), severe pulm HTN, CKD, hypothyroidism, who is presenting to ED after experiencing worsening SOB and intermittent chest pain for 1 week at [facility]. Of note, pt was recently discharged on 6/19 after having mitral clip procedure completed. She initially required BiPAP with improvement in her respiratory status following diuresis. Initiated on vasopressors and inotropes in CCU, which were subsequently weaned off. Infectious work up was negative.    #HFrEF likely 2/2 ICM with MR s/p alonso clip  - c/w Lasix 60 IV BID  May change to oral Demadex as before before discharge home  spoke with the team to discuss with cardiology  - Will likely switch back to torsemide 100 mg daily    -    - continue hydralazine 50 mg TID and isordil 30 TID  - continue spironolactone 25 mg  - continue Metoprolol Succinate 25 mg PO Daily   - No further cardiac testing at this time.      #CAD  - continue ASA and statin   - Pt with chest pain, but also with end stage cardiomyopathy, coronary disease, had MitraClip and no further intervention feasible. She should not have trops checked.    #SVT - likely Atrial Tach vs. Atrial Flutter   - Amio initially not given due to elevated LFTs likely in the setting of congestion.   - recheck LFTs.   - Monitor on tele.   - continue metoprolol at current dose as above  - If pt has SVT again can consider amio   #diabetes on  88   unit  Lantus and 	 and regular insulin 3 times a day at night she is receiving 46 units secondary to elevated hyperglycemia at bedtime she is using total of 208 units of insulin will ask Endo if can  her insulin can be adjusted for different kind and can be used only twice a day   discussed with NP          DVT PPX:    ADVANCED DIRECTIVE:    DISPOSITION:

## 2019-08-13 NOTE — PROGRESS NOTE ADULT - SUBJECTIVE AND OBJECTIVE BOX
[facility] [place] [facility]OLOGY [facility]ACT[facility]   MD GONZALEZ SHAH D.O, PA    TELEPHONE NUMBERS:  OFFICE: [phone]  DR. SANTOYO CELL: [phone]  JUSTEN FIELD CELL: [phone]  DR. RIDER CELL:  [phone]    RENAL FOLLOW UP NOTE  --------------------------------------------------------------------------------  HPI: Pt seen and examined at bedside. Pt feels stronger   Denies SOB, chest pain     PAST HISTORY  --------------------------------------------------------------------------------  No significant changes to PMH, PSH, FHx, SHx, unless otherwise noted    ALLERGIES & MEDICATIONS  --------------------------------------------------------------------------------  Allergies    azithromycin (Hives; Pruritus)    Intolerances      Standing Inpatient Medications  acetaminophen  IVPB .. 1000 milliGRAM(s) IV Intermittent once  aspirin enteric coated 81 milliGRAM(s) Oral daily  atorvastatin 40 milliGRAM(s) Oral at bedtime  chlorhexidine 2% Cloths 1 Application(s) Topical daily  clopidogrel Tablet 75 milliGRAM(s) Oral daily  dextrose 5%. 1000 milliLiter(s) IV Continuous <Continuous>  dextrose 50% Injectable 12.5 Gram(s) IV Push once  dextrose 50% Injectable 25 Gram(s) IV Push once  dextrose 50% Injectable 25 Gram(s) IV Push once  docusate sodium 100 milliGRAM(s) Oral two times a day  furosemide   Injectable 60 milliGRAM(s) IV Push every 12 hours  heparin  Injectable 5000 Unit(s) SubCutaneous every 12 hours  hydrALAZINE 50 milliGRAM(s) Oral every 8 hours  insulin glargine Injectable (LANTUS) 88 Unit(s) SubCutaneous at bedtime  insulin lispro (HumaLOG) corrective regimen sliding scale   SubCutaneous three times a day before meals  insulin lispro (HumaLOG) corrective regimen sliding scale   SubCutaneous at bedtime  insulin lispro Injectable (HumaLOG) 46 Unit(s) SubCutaneous before dinner  insulin lispro Injectable (HumaLOG) 42 Unit(s) SubCutaneous before lunch  insulin lispro Injectable (HumaLOG) 42 Unit(s) SubCutaneous before breakfast  isosorbide   dinitrate Tablet (ISORDIL) 30 milliGRAM(s) Oral three times a day  levothyroxine 50 MICROGram(s) Oral daily  lidocaine   Patch 1 Patch Transdermal daily  melatonin 3 milliGRAM(s) Oral at bedtime  metoprolol succinate ER 25 milliGRAM(s) Oral daily  montelukast 10 milliGRAM(s) Oral daily  Nephro-enrico 1 Tablet(s) Oral daily  pantoprazole    Tablet 40 milliGRAM(s) Oral before breakfast  polyethylene glycol 3350 17 Gram(s) Oral daily  senna 2 Tablet(s) Oral at bedtime  spironolactone 25 milliGRAM(s) Oral daily    PRN Inpatient Medications  acetaminophen   Tablet .. 650 milliGRAM(s) Oral every 6 hours PRN  dextrose 40% Gel 15 Gram(s) Oral once PRN  glucagon  Injectable 1 milliGRAM(s) IntraMuscular once PRN  traMADol 25 milliGRAM(s) Oral every 6 hours PRN      REVIEW OF SYSTEMS  --------------------------------------------------------------------------------  General: no fever  CVS: no chest pain  RESP: no sob, no cough  ABD: no abdominal pain  : no dysuria,  MSK: no edema     VITALS/PHYSICAL EXAM  --------------------------------------------------------------------------------  T(C): 36.9 (08-13-19 @ 04:13), Max: 36.9 (08-13-19 @ 04:13)  HR: 79 (08-13-19 @ 06:05) (74 - 84)  BP: 100/57 (08-13-19 @ 06:05) (100/57 - 122/58)  RR: 18 (08-13-19 @ 04:13) (17 - 18)  SpO2: 98% (08-13-19 @ 04:13) (95% - 98%)  Wt(kg): --    08-12-19 @ 07:01  -  08-13-19 @ 07:00  --------------------------------------------------------  IN: 700 mL / OUT: 1000 mL / NET: -300 mL      Physical Exam:  	Gen: NAD  	HEENT: MMMARY  	Pulm: Course BS B/L  	CV: S1S2  	Abd: Soft, +BS  	Ext: No LE edema B/L                      Neuro: Awake   	Skin: Warm and Dry     LABS/STUDIES  --------------------------------------------------------------------------------              9.5    9.80  >-----------<  328      [08-12-19 @ 08:28]              29.3     136  |  97  |  63  ----------------------------<  328      [08-13-19 @ 06:18]  3.8   |  21  |  2.18        Ca     10.0     [08-13-19 @ 06:18]    Creatinine Trend:  SCr 2.18 [08-13 @ 06:18]  SCr 1.86 [08-12 @ 06:13]  SCr 1.86 [08-11 @ 06:43]  SCr 2.07 [08-10 @ 05:11]  SCr 1.88 [08-09 @ 06:06]    Iron 42, TIBC 348, %sat 12      [07-05-19 @ 22:32]  Ferritin 130      [07-05-19 @ 22:32]  .4 (Ca --)      [04-25-19 @ 05:45]   --  PTH 43.55 (Ca --)      [09-10-18 @ 07:15]   --  PTH 36.60 (Ca --)      [09-08-18 @ 03:15]   --  Vitamin D (25OH) 25.9      [05-01-19 @ 04:00]  HbA1c 7.4      [07-05-19 @ 22:32]  TSH 2.00      [07-05-19 @ 22:32]  Lipid: chol 148, , HDL 35,       [06-01-19 @ 08:00]

## 2019-08-13 NOTE — PROGRESS NOTE ADULT - SUBJECTIVE AND OBJECTIVE BOX
Cardiology Attending Progress Note    CHIEF COMPLAINT/REASON FOR CONSULT: CHF, SOB    HISTORY OF PRESENT ILLNESS:    74 yo woman with HTN, DM2 CAD s/p CABG (LIMA to LAD, SVG to OM, SVG to PDA 2014 at [facility]), ICM (EF 20-25%), severe mitral regurgitation s/p mitral clip (6/13/19 Dr. Newman), severe pulm HTN, CKD, hypothyroidism, presenting with decompensated heart failure requiring brief inotropic support and diuresis with good clinical response in the setting of underlying anxiety and baseline chronic dyspnea.     INTERVAL EVENTS:   Patient seen and examined. She reports feeling fatigued, but otherwise denies Chest Pain. No SOB above her baseline. No HA/Dizziness. No Palpitations. No N/V/D/F/C.      Allergies    azithromycin (Hives; Pruritus)    Intolerances    	    MEDICATIONS:  aspirin enteric coated 81 milliGRAM(s) Oral daily  buMETAnide 3 milliGRAM(s) Oral every 12 hours  clopidogrel Tablet 75 milliGRAM(s) Oral daily  heparin  Injectable 5000 Unit(s) SubCutaneous every 12 hours  hydrALAZINE 50 milliGRAM(s) Oral every 8 hours  isosorbide   dinitrate Tablet (ISORDIL) 30 milliGRAM(s) Oral three times a day  metoprolol succinate ER 25 milliGRAM(s) Oral daily  spironolactone 25 milliGRAM(s) Oral daily      montelukast 10 milliGRAM(s) Oral daily    acetaminophen   Tablet .. 650 milliGRAM(s) Oral every 6 hours PRN  acetaminophen  IVPB .. 1000 milliGRAM(s) IV Intermittent once  melatonin 3 milliGRAM(s) Oral at bedtime  traMADol 25 milliGRAM(s) Oral every 6 hours PRN    docusate sodium 100 milliGRAM(s) Oral two times a day  pantoprazole    Tablet 40 milliGRAM(s) Oral before breakfast  polyethylene glycol 3350 17 Gram(s) Oral daily  senna 2 Tablet(s) Oral at bedtime    atorvastatin 40 milliGRAM(s) Oral at bedtime  dextrose 40% Gel 15 Gram(s) Oral once PRN  dextrose 50% Injectable 12.5 Gram(s) IV Push once  dextrose 50% Injectable 25 Gram(s) IV Push once  dextrose 50% Injectable 25 Gram(s) IV Push once  glucagon  Injectable 1 milliGRAM(s) IntraMuscular once PRN  insulin glargine Injectable (LANTUS) 88 Unit(s) SubCutaneous at bedtime  insulin lispro (HumaLOG) corrective regimen sliding scale   SubCutaneous three times a day before meals  insulin lispro (HumaLOG) corrective regimen sliding scale   SubCutaneous at bedtime  insulin lispro Injectable (HumaLOG) 46 Unit(s) SubCutaneous before dinner  insulin lispro Injectable (HumaLOG) 42 Unit(s) SubCutaneous before lunch  insulin lispro Injectable (HumaLOG) 42 Unit(s) SubCutaneous before breakfast  levothyroxine 50 MICROGram(s) Oral daily    chlorhexidine 2% Cloths 1 Application(s) Topical daily  dextrose 5%. 1000 milliLiter(s) IV Continuous <Continuous>  lidocaine   Patch 1 Patch Transdermal daily  Nephro-enrico 1 Tablet(s) Oral daily      PAST MEDICAL & SURGICAL HISTORY:  GERD (gastroesophageal reflux disease)  CAD (coronary artery disease): s/p CABG  Hypothyroidism  Hyperlipidemia  Diabetes mellitus, type 2  S/P CABG (coronary artery bypass graft)      FAMILY HISTORY:  Family history of hypertension (Sibling): sister  Family history of diabetes mellitus (Sibling): brothers/sisters      SOCIAL HISTORY:    Never smoked, no ETOH, no IVDU    REVIEW OF SYSTEMS:    CONSTITUTIONAL: +weakness, fevers or chills  EYES/ENT: No visual changes;  No vertigo or throat pain   NECK: No pain or stiffness  RESPIRATORY: No cough, wheezing, hemoptysis; No shortness of breath  CARDIOVASCULAR: No chest pain or palpitations  GASTROINTESTINAL: No abdominal or epigastric pain. No nausea, vomiting, or hematemesis; No diarrhea or constipation. No melena or hematochezia.  GENITOURINARY: No dysuria, frequency or hematuria  NEUROLOGICAL: No numbness or weakness  SKIN: No itching, burning, rashes, or lesions   All other review of systems is negative unless indicated above.    PHYSICAL EXAM:  T(C): 36.6 (08-13-19 @ 12:07), Max: 36.9 (08-13-19 @ 04:13)  HR: 78 (08-13-19 @ 12:07) (78 - 84)  BP: 103/61 (08-13-19 @ 12:07) (100/57 - 122/58)  RR: 18 (08-13-19 @ 12:07) (17 - 18)  SpO2: 99% (08-13-19 @ 12:07) (95% - 99%)  Wt(kg): --  I&O's Summary    12 Aug 2019 07:01  -  13 Aug 2019 07:00  --------------------------------------------------------  IN: 700 mL / OUT: 1000 mL / NET: -300 mL    13 Aug 2019 07:01  -  13 Aug 2019 13:22  --------------------------------------------------------  IN: 240 mL / OUT: 0 mL / NET: 240 mL        Appearance: Normal	  HEENT:   Normal oral mucosa, PERRL, EOMI	  Lymphatic: No lymphadenopathy  Cardiovascular: Normal S1 S2, No JVD, 2/6 YUSUF  Respiratory: Lungs clear to auscultation	  Psychiatry: A & O x 3, Mood & affect appropriate  Gastrointestinal:  Soft, Non-tender, + BS	  Skin: No rashes, No ecchymoses, No cyanosis	  Neurologic: Non-focal  Extremities: Normal range of motion, No clubbing, cyanosis or edema  Vascular: Peripheral pulses palpable 2+ bilaterally    LABS:	 	    CBC Full  -  ( 13 Aug 2019 08:42 )  WBC Count : 9.06 K/uL  Hemoglobin : 9.7 g/dL  Hematocrit : 30.4 %  Platelet Count - Automated : 321 K/uL  Mean Cell Volume : 89.1 fl  Mean Cell Hemoglobin : 28.4 pg  Mean Cell Hemoglobin Concentration : 31.9 gm/dL  Auto Neutrophil # : x  Auto Lymphocyte # : x  Auto Monocyte # : x  Auto Eosinophil # : x  Auto Basophil # : x  Auto Neutrophil % : x  Auto Lymphocyte % : x  Auto Monocyte % : x  Auto Eosinophil % : x  Auto Basophil % : x    08-13    136  |  97  |  63<H>  ----------------------------<  328<H>  3.8   |  21<L>  |  2.18<H>  08-12    138  |  98  |  56<H>  ----------------------------<  177<H>  3.6   |  23  |  1.86<H>    Ca    10.0      13 Aug 2019 06:18  Ca    9.8      12 Aug 2019 06:13        ECG: NSR, incomplete LBBB    TELEMETRY: NSR , PVC's  	    	    TTE: 07/09/2019:  EF (Teicholtz): 21 %  Doppler Peak Velocity (m/sec): AoV=1.0  ------------------------------------------------------------------------  Observations:  Mitral Valve: Mitral Valve Clip. Mild mitral regurgitation.    Aortic Valve/Aorta: Normal trileaflet aortic valve. Peak  transaortic valve gradient equals 4 mm Hg. Minimal aortic  regurgitation.  Peak left ventricular outflow tract  gradient equals 1 mm Hg.  Normal aortic root.  Left Atrium: Normal left atrium.  Left Ventricle: Severe global left ventricular systolic  dysfunction. Endocardial visualization enhanced with  intravenous injection of Ultrasonic Enhancing Agent  (Definity). Normal left ventricular internal dimensions and  wall thicknesses.  Right Heart: Normal right atrium. The right ventricle is  not well visualized; grossly normal right ventricular  systolic function. Normal tricuspid valve. Mild-moderate  tricuspid regurgitation. Normal pulmonic valve. Mild  pulmonic regurgitation.  Pericardium/Pleura: Normal pericardium with no pericardial  effusion.  Hemodynamic: Estimated right ventricular systolic pressure  equals 67 mm Hg, assuming right atrial pressure equals 15  mm Hg, consistent with severe pulmonary hypertension.  ------------------------------------------------------------------------  Conclusions:  1. Severe global left ventricular systolic dysfunction.  Endocardial visualization enhanced with intravenous  injection of Ultrasonic Enhancing Agent (Definity).  2. The right ventricle is not well visualized; grossly  normal right ventricular systolic function.  3. Estimated right ventricular systolic pressure equals 67  mm Hg, assuming right atrial pressure equals 15 mm Hg,  consistent with severe pulmonary hypertension.  4. Normal tricuspid valve. Mild-moderate tricuspid  regurgitation.      A/P: 74 yo woman with HTN, DM2 CAD s/p CABG (LIMA to LAD, SVG to OM, SVG to PDA 2014 at [facility]), ICM (EF 20-25%), severe mitral regurgitation s/p mitral clip (6/13/19 Dr. Newman), severe pulm HTN, CKD, hypothyroidism, presenting with decompensated heart failure requiring brief inotropic support and diuresis with good clinical response in the setting of underlying anxiety and baseline chronic dyspnea.       1. HFrEF, Severe pHTN, mod-Severe TR, s/p Jennifer-clip - LVEF 20-25%  -ICM with MR s/p Mitraclip  -Improved respiratory status currently at baseline dyspnea in the setting of severe cardiomyopathy and severe valve pathology.   -Saturating well on room air but remains anxious with NC in place on nose without oxygen flowing.   -Continue medical management with hydralazine 50 mg TID and isordil 30 TID, spironolactone 25 mg, Metoprolol Succinate 25 mg PO Daily.   -Maintain diuresis as she appears euvolemic at this time. Cont Bumex 3 mg po BID.   -Continue ongoing goals of care discussions in this patient with overall poor prognosis.    2) CAD s/p CABG -   - Overall picture of mild chest discomfort c/w setting of end stage cardiomyopathy, severe valve disease, and underlying CAD.   - Cont ASA 81 mg po QD  -Cont Plavix 75 mg po QD  -Cont Atorvastatin 40 mg po QHS  -Cont Toprol XL 25 mg po QD    3. SVT - Hx of SVT while in while in ICU setting. No events on telemetry overnight  -Cont Toprol XL 25 mg po QD    Cardiology will sign off at this time. Please re-consult as needed.  Please arrange for the outpatient cardiology follow-up within 4-6 weeks with at the [facility] by calling [phone].    Bonilla Eng MD  Cardiology Attending  [facility]  Cell: [phone]  (Cardiology Nocturnist cell number available 7 PM -7 AM every night; available day time weekdays for follow-up only; day time weekends covered by general cardiology consult service)

## 2019-08-13 NOTE — PROGRESS NOTE ADULT - ASSESSMENT
Pt is a 74 yo woman with PMHx of HTN, T2DM, CAD s/p CABG (LIMA to LAD, SVG to OM, SVG to PDA 2014 at [facility]), non-dilated ICM (EF 20-25%), severe mitral regurgitation s/p mitral clip (6/13/19 Dr. Newman), severe pulm HTN, CKD, hypothyroidism admitted with worsening SOB.    A/P:  MERVIN  Likely sec to cardiogenic shock  Renal function has increased to 2.1 today  Pt currently on lasix 60 IV BID and remains on oral Spironolactone  Continue diuretic therapy per cardiology.  Optimize glucose control  Monitor renal function   Avoid further nephrotoxics, NSAIDS, RCA    CKD stage 4:  baseline Scr 1.8-2.0. Scr stable at 2.1 today.   Renal function fluctuates sec to CHF  Monitor renal function at present    SOB:  in setting of HF. Improving on diuretic therapy.   Continue diuretics per cardiology    Acidosis   Sec to Renal failure  Improved and is currently at goal   Monitor serum Co2 at present    Hypokalemia:  in the setting of diuretic therapy.   Serum potassium improved after repletion     Anemia:   Hb stable  Pt with iron deficiency anemia.

## 2019-08-14 LAB
ANION GAP SERPL CALC-SCNC: 17 MMOL/L — SIGNIFICANT CHANGE UP (ref 5–17)
BUN SERPL-MCNC: 69 MG/DL — HIGH (ref 7–23)
CALCIUM SERPL-MCNC: 9.7 MG/DL — SIGNIFICANT CHANGE UP (ref 8.4–10.5)
CHLORIDE SERPL-SCNC: 100 MMOL/L — SIGNIFICANT CHANGE UP (ref 96–108)
CO2 SERPL-SCNC: 22 MMOL/L — SIGNIFICANT CHANGE UP (ref 22–31)
CREAT SERPL-MCNC: 1.91 MG/DL — HIGH (ref 0.5–1.3)
GLUCOSE BLDC GLUCOMTR-MCNC: 317 MG/DL — HIGH (ref 70–99)
GLUCOSE BLDC GLUCOMTR-MCNC: 329 MG/DL — HIGH (ref 70–99)
GLUCOSE BLDC GLUCOMTR-MCNC: 364 MG/DL — HIGH (ref 70–99)
GLUCOSE BLDC GLUCOMTR-MCNC: 376 MG/DL — HIGH (ref 70–99)
GLUCOSE SERPL-MCNC: 237 MG/DL — HIGH (ref 70–99)
HCT VFR BLD CALC: 30.4 % — LOW (ref 34.5–45)
HGB BLD-MCNC: 9.5 G/DL — LOW (ref 11.5–15.5)
MAGNESIUM SERPL-MCNC: 2.3 MG/DL — SIGNIFICANT CHANGE UP (ref 1.6–2.6)
MCHC RBC-ENTMCNC: 27.6 PG — SIGNIFICANT CHANGE UP (ref 27–34)
MCHC RBC-ENTMCNC: 31.3 GM/DL — LOW (ref 32–36)
MCV RBC AUTO: 88.4 FL — SIGNIFICANT CHANGE UP (ref 80–100)
PHOSPHATE SERPL-MCNC: 3.3 MG/DL — SIGNIFICANT CHANGE UP (ref 2.5–4.5)
PLATELET # BLD AUTO: 348 K/UL — SIGNIFICANT CHANGE UP (ref 150–400)
POTASSIUM SERPL-MCNC: 3.4 MMOL/L — LOW (ref 3.5–5.3)
POTASSIUM SERPL-SCNC: 3.4 MMOL/L — LOW (ref 3.5–5.3)
RBC # BLD: 3.44 M/UL — LOW (ref 3.8–5.2)
RBC # FLD: 16.4 % — HIGH (ref 10.3–14.5)
SODIUM SERPL-SCNC: 139 MMOL/L — SIGNIFICANT CHANGE UP (ref 135–145)
WBC # BLD: 9.59 K/UL — SIGNIFICANT CHANGE UP (ref 3.8–10.5)
WBC # FLD AUTO: 9.59 K/UL — SIGNIFICANT CHANGE UP (ref 3.8–10.5)

## 2019-08-14 PROCEDURE — 93010 ELECTROCARDIOGRAM REPORT: CPT

## 2019-08-14 RX ORDER — ALPRAZOLAM 0.25 MG
0.25 TABLET ORAL THREE TIMES A DAY
Refills: 0 | Status: DISCONTINUED | OUTPATIENT
Start: 2019-08-14 | End: 2019-08-22

## 2019-08-14 RX ORDER — POTASSIUM CHLORIDE 20 MEQ
40 PACKET (EA) ORAL ONCE
Refills: 0 | Status: COMPLETED | OUTPATIENT
Start: 2019-08-14 | End: 2019-08-14

## 2019-08-14 RX ADMIN — Medication 5: at 16:59

## 2019-08-14 RX ADMIN — ISOSORBIDE DINITRATE 30 MILLIGRAM(S): 5 TABLET ORAL at 21:56

## 2019-08-14 RX ADMIN — Medication 25 MILLIGRAM(S): at 05:24

## 2019-08-14 RX ADMIN — ISOSORBIDE DINITRATE 30 MILLIGRAM(S): 5 TABLET ORAL at 05:24

## 2019-08-14 RX ADMIN — Medication 50 MILLIGRAM(S): at 05:24

## 2019-08-14 RX ADMIN — PANTOPRAZOLE SODIUM 40 MILLIGRAM(S): 20 TABLET, DELAYED RELEASE ORAL at 05:24

## 2019-08-14 RX ADMIN — HEPARIN SODIUM 5000 UNIT(S): 5000 INJECTION INTRAVENOUS; SUBCUTANEOUS at 17:19

## 2019-08-14 RX ADMIN — HEPARIN SODIUM 5000 UNIT(S): 5000 INJECTION INTRAVENOUS; SUBCUTANEOUS at 05:24

## 2019-08-14 RX ADMIN — Medication 50 MILLIGRAM(S): at 21:56

## 2019-08-14 RX ADMIN — Medication 42 UNIT(S): at 08:03

## 2019-08-14 RX ADMIN — Medication 81 MILLIGRAM(S): at 12:48

## 2019-08-14 RX ADMIN — BUMETANIDE 3 MILLIGRAM(S): 0.25 INJECTION INTRAMUSCULAR; INTRAVENOUS at 05:24

## 2019-08-14 RX ADMIN — CLOPIDOGREL BISULFATE 75 MILLIGRAM(S): 75 TABLET, FILM COATED ORAL at 12:48

## 2019-08-14 RX ADMIN — TRAMADOL HYDROCHLORIDE 25 MILLIGRAM(S): 50 TABLET ORAL at 05:29

## 2019-08-14 RX ADMIN — MONTELUKAST 10 MILLIGRAM(S): 4 TABLET, CHEWABLE ORAL at 12:48

## 2019-08-14 RX ADMIN — INSULIN GLARGINE 88 UNIT(S): 100 INJECTION, SOLUTION SUBCUTANEOUS at 21:56

## 2019-08-14 RX ADMIN — ATORVASTATIN CALCIUM 40 MILLIGRAM(S): 80 TABLET, FILM COATED ORAL at 21:56

## 2019-08-14 RX ADMIN — LIDOCAINE 1 PATCH: 4 CREAM TOPICAL at 21:56

## 2019-08-14 RX ADMIN — Medication 4: at 08:03

## 2019-08-14 RX ADMIN — Medication 4: at 12:22

## 2019-08-14 RX ADMIN — SPIRONOLACTONE 25 MILLIGRAM(S): 25 TABLET, FILM COATED ORAL at 05:24

## 2019-08-14 RX ADMIN — Medication 46 UNIT(S): at 16:59

## 2019-08-14 RX ADMIN — LIDOCAINE 1 PATCH: 4 CREAM TOPICAL at 09:40

## 2019-08-14 RX ADMIN — Medication 0.25 MILLIGRAM(S): at 10:06

## 2019-08-14 RX ADMIN — Medication 1 TABLET(S): at 12:48

## 2019-08-14 RX ADMIN — Medication 50 MICROGRAM(S): at 05:24

## 2019-08-14 RX ADMIN — Medication 50 MILLIGRAM(S): at 14:46

## 2019-08-14 RX ADMIN — Medication 100 MILLIGRAM(S): at 17:19

## 2019-08-14 RX ADMIN — BUMETANIDE 3 MILLIGRAM(S): 0.25 INJECTION INTRAMUSCULAR; INTRAVENOUS at 17:19

## 2019-08-14 RX ADMIN — Medication 3 MILLIGRAM(S): at 21:56

## 2019-08-14 RX ADMIN — ISOSORBIDE DINITRATE 30 MILLIGRAM(S): 5 TABLET ORAL at 14:46

## 2019-08-14 RX ADMIN — Medication 3: at 22:05

## 2019-08-14 RX ADMIN — Medication 40 MILLIEQUIVALENT(S): at 10:08

## 2019-08-14 RX ADMIN — Medication 42 UNIT(S): at 12:22

## 2019-08-14 RX ADMIN — TRAMADOL HYDROCHLORIDE 25 MILLIGRAM(S): 50 TABLET ORAL at 04:32

## 2019-08-14 RX ADMIN — POLYETHYLENE GLYCOL 3350 17 GRAM(S): 17 POWDER, FOR SOLUTION ORAL at 12:48

## 2019-08-14 RX ADMIN — LIDOCAINE 1 PATCH: 4 CREAM TOPICAL at 08:01

## 2019-08-14 NOTE — PROGRESS NOTE ADULT - ASSESSMENT
Assessment  DMT2: 73y Female with DM T2 with hyperglycemia on insulin, blood sugars still hig   329 this AM,   did body exam again, could not identify any source of infection that could explain her insulin resistance.  CAD: S/P cabg On medications, stable, monitored.  Hypothyroidism:  On synthroid 50  mcg po daily, asymptomatic.  HTN: Controlled, On med.  HLD; on statin  CKD: Monitor labs/BMP,         Plan:   DMT2:  The regiment just changed ,let see how it will work     LAntus to 45 units in AM, and 56 units in PM     Humalog to 42  units before breakfast and Lunch and 46 units pre-dinner   in addition to correction scale coverage, monitor FS will FU  Patient counseled for compliance with consistent low carb diet.  Tried to switch to  U500 insulin, but pharmacy policy not allowing that   Plan for discharge will be U-500  ; 130 units 15 min prior to breakfast, and 90 units 15 min  pre-dinner  CAD: S/P cabg On medications, stable, monitored.  Hypothyroidism: tsh 2.0  On synthroid 50  mcg po daily, asymptomatic. F/U tsh as outpatient  HTN: Continue treatment, monitoring, Primary team FU  HLD; on statin  CKD: Continue monitoring labs, renal FU  Case discussed with the NP  erika Woodard MD  [phone]

## 2019-08-14 NOTE — PROVIDER CONTACT NOTE (OTHER) - ACTION/TREATMENT ORDERED:
[place]ad[place] FIELD aware and acknowledged. EKG done. Provider will evaluate patient at bedside. Will continue to monitor.

## 2019-08-14 NOTE — PROGRESS NOTE ADULT - SUBJECTIVE AND OBJECTIVE BOX
[facility]   MD GONZALEZ SHAH D.O, PA    TELEPHONE NUMBERS:  OFFICE: [phone]  DR. SANTOYO CELL: [phone]  JUSTEN FIELD CELL: [phone]  DR. RIDER CELL:  [phone]    RENAL FOLLOW UP NOTE  --------------------------------------------------------------------------------  HPI: Pt seen and examined at bedside.   Keira SOB, chest pain     PAST HISTORY  --------------------------------------------------------------------------------  No significant changes to PMH, PSH, FHx, SHx, unless otherwise noted    ALLERGIES & MEDICATIONS  --------------------------------------------------------------------------------  Allergies    azithromycin (Hives; Pruritus)    Intolerances      Standing Inpatient Medications  acetaminophen  IVPB .. 1000 milliGRAM(s) IV Intermittent once  aspirin enteric coated 81 milliGRAM(s) Oral daily  atorvastatin 40 milliGRAM(s) Oral at bedtime  buMETAnide 3 milliGRAM(s) Oral every 12 hours  chlorhexidine 2% Cloths 1 Application(s) Topical daily  clopidogrel Tablet 75 milliGRAM(s) Oral daily  dextrose 5%. 1000 milliLiter(s) IV Continuous <Continuous>  dextrose 50% Injectable 12.5 Gram(s) IV Push once  dextrose 50% Injectable 25 Gram(s) IV Push once  dextrose 50% Injectable 25 Gram(s) IV Push once  docusate sodium 100 milliGRAM(s) Oral two times a day  heparin  Injectable 5000 Unit(s) SubCutaneous every 12 hours  hydrALAZINE 50 milliGRAM(s) Oral every 8 hours  insulin glargine Injectable (LANTUS) 88 Unit(s) SubCutaneous at bedtime  insulin lispro (HumaLOG) corrective regimen sliding scale   SubCutaneous three times a day before meals  insulin lispro (HumaLOG) corrective regimen sliding scale   SubCutaneous at bedtime  insulin lispro Injectable (HumaLOG) 46 Unit(s) SubCutaneous before dinner  insulin lispro Injectable (HumaLOG) 42 Unit(s) SubCutaneous before lunch  insulin lispro Injectable (HumaLOG) 42 Unit(s) SubCutaneous before breakfast  isosorbide   dinitrate Tablet (ISORDIL) 30 milliGRAM(s) Oral three times a day  levothyroxine 50 MICROGram(s) Oral daily  lidocaine   Patch 1 Patch Transdermal daily  melatonin 3 milliGRAM(s) Oral at bedtime  metoprolol succinate ER 25 milliGRAM(s) Oral daily  montelukast 10 milliGRAM(s) Oral daily  Nephro-enrico 1 Tablet(s) Oral daily  pantoprazole    Tablet 40 milliGRAM(s) Oral before breakfast  polyethylene glycol 3350 17 Gram(s) Oral daily  potassium chloride    Tablet ER 40 milliEquivalent(s) Oral once  senna 2 Tablet(s) Oral at bedtime  spironolactone 25 milliGRAM(s) Oral daily    PRN Inpatient Medications  acetaminophen   Tablet .. 650 milliGRAM(s) Oral every 6 hours PRN  dextrose 40% Gel 15 Gram(s) Oral once PRN  glucagon  Injectable 1 milliGRAM(s) IntraMuscular once PRN  traMADol 25 milliGRAM(s) Oral every 6 hours PRN      REVIEW OF SYSTEMS  --------------------------------------------------------------------------------  General: no fever  CVS: no chest pain  RESP: no sob, no cough  ABD: no abdominal pain  : no dysuria,  MSK: no edema     VITALS/PHYSICAL EXAM  --------------------------------------------------------------------------------  T(C): 36.6 (08-14-19 @ 04:22), Max: 37.1 (08-14-19 @ 02:27)  HR: 82 (08-14-19 @ 05:23) (78 - 89)  BP: 118/63 (08-14-19 @ 05:23) (103/61 - 118/63)  RR: 28 (08-14-19 @ 04:22) (18 - 28)  SpO2: 100% (08-14-19 @ 04:22) (96% - 100%)  Wt(kg): --        08-13-19 @ 07:01  -  08-14-19 @ 07:00  --------------------------------------------------------  IN: 360 mL / OUT: 1200 mL / NET: -840 mL    08-14-19 @ 07:01  -  08-14-19 @ 08:27  --------------------------------------------------------  IN: 0 mL / OUT: 300 mL / NET: -300 mL      Physical Exam:  	Gen: NAD  	HEENT: MONICA  	Pulm: Course BS B/L  	CV: S1S2  	Abd: Soft, +BS  	Ext: No LE edema B/L                      Neuro: Awake   	Skin: Warm and Dry     LABS/STUDIES  --------------------------------------------------------------------------------              9.7    9.06  >-----------<  321      [08-13-19 @ 08:42]              30.4     139  |  100  |  69  ----------------------------<  237      [08-14-19 @ 06:23]  3.4   |  22  |  1.91        Ca     9.7     [08-14-19 @ 06:23]      Mg     2.3     [08-14-19 @ 06:23]      Phos  3.3     [08-14-19 @ 06:23]    Creatinine Trend:  SCr 1.91 [08-14 @ 06:23]  SCr 2.18 [08-13 @ 06:18]  SCr 1.86 [08-12 @ 06:13]  SCr 1.86 [08-11 @ 06:43]  SCr 2.07 [08-10 @ 05:11]      Iron 42, TIBC 348, %sat 12      [07-05-19 @ 22:32]  Ferritin 130      [07-05-19 @ 22:32]  .4 (Ca --)      [04-25-19 @ 05:45]   --  PTH 43.55 (Ca --)      [09-10-18 @ 07:15]   --  PTH 36.60 (Ca --)      [09-08-18 @ 03:15]   --  Vitamin D (25OH) 25.9      [05-01-19 @ 04:00]  HbA1c 7.4      [07-05-19 @ 22:32]  TSH 2.00      [07-05-19 @ 22:32]  Lipid: chol 148, , HDL 35,       [06-01-19 @ 08:00]

## 2019-08-14 NOTE — PROGRESS NOTE ADULT - ASSESSMENT
Pt is a 72 yo woman with PMHx of HTN, T2DM, CAD s/p CABG (LIMA to LAD, SVG to OM, SVG to PDA 2014 at [facility]), non-dilated ICM (EF 20-25%), severe mitral regurgitation s/p mitral clip (6/13/19 Dr. Newman), severe pulm HTN, CKD, hypothyroidism admitted with worsening SOB.    A/P:  MERVIN  Likely sec to cardiogenic shock  Renal function stable at 1.91 today  Pt currently on Bumex 3 mg PO Q12 and remains on oral Spironolactone  Continue diuretic therapy per cardiology.  Optimize glucose control  Monitor renal function   Avoid further nephrotoxics, NSAIDS, RCA    CKD stage 4:  baseline Scr 1.8-2.0. Scr stable at 1.91 today.   Renal function fluctuates sec to CHF  Monitor renal function at present    SOB:  in setting of HF. Improving on diuretic therapy.   Continue diuretics per cardiology    Acidosis   Sec to Renal failure  Improved and is currently at goal   Monitor serum Co2 at present    Hypokalemia:  in the setting of diuretic therapy.   Serum potassium low today. Recommend kcl 40meq PO today     Anemia:   Hb stable  Pt with iron deficiency anemia.

## 2019-08-14 NOTE — PROGRESS NOTE ADULT - SUBJECTIVE AND OBJECTIVE BOX
CHIEF COMPLAINT:    SUBJECTIVE:     REVIEW OF SYSTEMS:    CONSTITUTIONAL: (  )  weakness,  (  ) fevers or chills  EYES/ENT: (  )visual changes;     NECK: (  ) pain or stiffness  RESPIRATORY:   (  )cough, wheezing, hemoptysis;  (  ) shortness of breath  CARDIOVASCULAR:  (  )chest pain or palpitations  GASTROINTESTINAL:   (  )abdominal or epigastric pain.  (  ) nausea, vomiting, or hematemesis;   (   ) diarrhea or constipation.   GENITOURINARY:   (    ) dysuria, frequency or hematuria  NEUROLOGICAL:  (   ) numbness or weakness   All other review of systems is negative unless indicated above    Vital Signs Last 24 Hrs  T(C): 36.6 (14 Aug 2019 04:22), Max: 37.1 (14 Aug 2019 02:27)  T(F): 97.9 (14 Aug 2019 04:22), Max: 98.7 (14 Aug 2019 02:27)  HR: 82 (14 Aug 2019 05:23) (78 - 89)  BP: 118/63 (14 Aug 2019 05:23) (103/61 - 118/63)  BP(mean): --  RR: 28 (14 Aug 2019 04:22) (18 - 28)  SpO2: 100% (14 Aug 2019 04:22) (96% - 100%)    I&O's Summary    13 Aug 2019 07:01  -  14 Aug 2019 07:00  --------------------------------------------------------  IN: 360 mL / OUT: 1200 mL / NET: -840 mL    14 Aug 2019 07:01  -  14 Aug 2019 09:46  --------------------------------------------------------  IN: 300 mL / OUT: 300 mL / NET: 0 mL        CAPILLARY BLOOD GLUCOSE      POCT Blood Glucose.: 329 mg/dL (14 Aug 2019 07:35)  POCT Blood Glucose.: 235 mg/dL (13 Aug 2019 21:03)  POCT Blood Glucose.: 246 mg/dL (13 Aug 2019 16:30)  POCT Blood Glucose.: 328 mg/dL (13 Aug 2019 11:47)      PHYSICAL EXAM:    Constitutional:  (   ) NAD,   (   )awake and alert  HEENT: PERR, EOMI,    Neck: Soft and supple, No LAD, No JVD  Respiratory:  (    Breath sounds are clear bilaterally,    (   ) wheezing, rales or rhonchi  Cardiovascular:     (   )S1 and S2, regular rate and rhythm, no Murmurs, gallops or rubs  Gastrointestinal:  (   )Bowel Sounds present, soft,   (  )nontender, nondistended,    Extremities:    (  ) peripheral edema  Vascular: 2+ peripheral pulses  Neurological:    (    )A/O x 3,   (  ) focal deficits  Musculoskeletal:    (   )  normal strength b/l upper  (     ) normal  lower extremities  Skin: No rashes    MEDICATIONS:  MEDICATIONS  (STANDING):  acetaminophen  IVPB .. 1000 milliGRAM(s) IV Intermittent once  aspirin enteric coated 81 milliGRAM(s) Oral daily  atorvastatin 40 milliGRAM(s) Oral at bedtime  buMETAnide 3 milliGRAM(s) Oral every 12 hours  chlorhexidine 2% Cloths 1 Application(s) Topical daily  clopidogrel Tablet 75 milliGRAM(s) Oral daily  dextrose 5%. 1000 milliLiter(s) (50 mL/Hr) IV Continuous <Continuous>  dextrose 50% Injectable 12.5 Gram(s) IV Push once  dextrose 50% Injectable 25 Gram(s) IV Push once  dextrose 50% Injectable 25 Gram(s) IV Push once  docusate sodium 100 milliGRAM(s) Oral two times a day  heparin  Injectable 5000 Unit(s) SubCutaneous every 12 hours  hydrALAZINE 50 milliGRAM(s) Oral every 8 hours  insulin glargine Injectable (LANTUS) 88 Unit(s) SubCutaneous at bedtime  insulin lispro (HumaLOG) corrective regimen sliding scale   SubCutaneous three times a day before meals  insulin lispro (HumaLOG) corrective regimen sliding scale   SubCutaneous at bedtime  insulin lispro Injectable (HumaLOG) 46 Unit(s) SubCutaneous before dinner  insulin lispro Injectable (HumaLOG) 42 Unit(s) SubCutaneous before lunch  insulin lispro Injectable (HumaLOG) 42 Unit(s) SubCutaneous before breakfast  isosorbide   dinitrate Tablet (ISORDIL) 30 milliGRAM(s) Oral three times a day  levothyroxine 50 MICROGram(s) Oral daily  lidocaine   Patch 1 Patch Transdermal daily  melatonin 3 milliGRAM(s) Oral at bedtime  metoprolol succinate ER 25 milliGRAM(s) Oral daily  montelukast 10 milliGRAM(s) Oral daily  Nephro-enrico 1 Tablet(s) Oral daily  pantoprazole    Tablet 40 milliGRAM(s) Oral before breakfast  polyethylene glycol 3350 17 Gram(s) Oral daily  potassium chloride    Tablet ER 40 milliEquivalent(s) Oral once  senna 2 Tablet(s) Oral at bedtime  spironolactone 25 milliGRAM(s) Oral daily      LABS: All Labs Reviewed:                        9.5    9.59  )-----------( 348      ( 14 Aug 2019 08:41 )             30.4     08-14    139  |  100  |  69<H>  ----------------------------<  237<H>  3.4<L>   |  22  |  1.91<H>    Ca    9.7      14 Aug 2019 06:23  Phos  3.3     08-14  Mg     2.3     08-14            Blood Culture:   Urine Culture      RADIOLOGY/EKG:    ASSESSMENT AND PLAN:    DVT PPX:    ADVANCED DIRECTIVE:    DISPOSITION: CHIEF COMPLAINT:  patient condition improving she seemed to be more awake variable and participate in her eating and ambulating  SUBJECTIVE:     REVIEW OF SYSTEMS:    CONSTITUTIONAL: ( x )  weakness,  (  ) fevers or chills  EYES/ENT: (  )visual changes;     NECK: (  ) pain or stiffness  RESPIRATORY:   (  )cough, wheezing, hemoptysis;  (  ) shortness of breath  CARDIOVASCULAR:  (  )chest pain or palpitations  GASTROINTESTINAL:   (  )abdominal or epigastric pain.  (  ) nausea, vomiting, or hematemesis;   (   ) diarrhea or constipation.   GENITOURINARY:   (    ) dysuria, frequency or hematuria  NEUROLOGICAL:  (   ) numbness or weakness   All other review of systems is negative unless indicated above    Vital Signs Last 24 Hrs  T(C): 36.6 (14 Aug 2019 04:22), Max: 37.1 (14 Aug 2019 02:27)  T(F): 97.9 (14 Aug 2019 04:22), Max: 98.7 (14 Aug 2019 02:27)  HR: 82 (14 Aug 2019 05:23) (78 - 89)  BP: 118/63 (14 Aug 2019 05:23) (103/61 - 118/63)  BP(mean): --  RR: 28 (14 Aug 2019 04:22) (18 - 28)  SpO2: 100% (14 Aug 2019 04:22) (96% - 100%)    I&O's Summary    13 Aug 2019 07:01  -  14 Aug 2019 07:00  --------------------------------------------------------  IN: 360 mL / OUT: 1200 mL / NET: -840 mL    14 Aug 2019 07:01  -  14 Aug 2019 09:46  --------------------------------------------------------  IN: 300 mL / OUT: 300 mL / NET: 0 mL        CAPILLARY BLOOD GLUCOSE      POCT Blood Glucose.: 329 mg/dL (14 Aug 2019 07:35)  POCT Blood Glucose.: 235 mg/dL (13 Aug 2019 21:03)  POCT Blood Glucose.: 246 mg/dL (13 Aug 2019 16:30)  POCT Blood Glucose.: 328 mg/dL (13 Aug 2019 11:47)      PHYSICAL EXAM:       Constitutional:  (  x ) NAD,   ( x  )awake and alert  HEENT: PERR, EOMI,    Neck: Soft and supple, No LAD, No JVD  Respiratory:  ( x   Breath sounds are clear bilaterally,    (   ) wheezing, rales or rhonchi  Cardiovascular:     ( x  )S1 and S2, regular rate and rhythm, no Murmurs, gallops or rubs  Gastrointestinal:  (  x )Bowel Sounds present, soft,   (  )nontender, nondistended,    Extremities:    (  ) peripheral edema  Vascular: 2+ peripheral pulses  Neurological:    ( x   )A/O x 3,   (  ) focal deficits  Musculoskeletal:    (   )  normal strength b/l upper  (     ) normal  lower extremities  Skin: No rashes      MEDICATIONS:  MEDICATIONS  (STANDING):  acetaminophen  IVPB .. 1000 milliGRAM(s) IV Intermittent once  aspirin enteric coated 81 milliGRAM(s) Oral daily  atorvastatin 40 milliGRAM(s) Oral at bedtime  buMETAnide 3 milliGRAM(s) Oral every 12 hours  chlorhexidine 2% Cloths 1 Application(s) Topical daily  clopidogrel Tablet 75 milliGRAM(s) Oral daily  dextrose 5%. 1000 milliLiter(s) (50 mL/Hr) IV Continuous <Continuous>  dextrose 50% Injectable 12.5 Gram(s) IV Push once  dextrose 50% Injectable 25 Gram(s) IV Push once  dextrose 50% Injectable 25 Gram(s) IV Push once  docusate sodium 100 milliGRAM(s) Oral two times a day  heparin  Injectable 5000 Unit(s) SubCutaneous every 12 hours  hydrALAZINE 50 milliGRAM(s) Oral every 8 hours  insulin glargine Injectable (LANTUS) 88 Unit(s) SubCutaneous at bedtime  insulin lispro (HumaLOG) corrective regimen sliding scale   SubCutaneous three times a day before meals  insulin lispro (HumaLOG) corrective regimen sliding scale   SubCutaneous at bedtime  insulin lispro Injectable (HumaLOG) 46 Unit(s) SubCutaneous before dinner  insulin lispro Injectable (HumaLOG) 42 Unit(s) SubCutaneous before lunch  insulin lispro Injectable (HumaLOG) 42 Unit(s) SubCutaneous before breakfast  isosorbide   dinitrate Tablet (ISORDIL) 30 milliGRAM(s) Oral three times a day  levothyroxine 50 MICROGram(s) Oral daily  lidocaine   Patch 1 Patch Transdermal daily  melatonin 3 milliGRAM(s) Oral at bedtime  metoprolol succinate ER 25 milliGRAM(s) Oral daily  montelukast 10 milliGRAM(s) Oral daily  Nephro-enrico 1 Tablet(s) Oral daily  pantoprazole    Tablet 40 milliGRAM(s) Oral before breakfast  polyethylene glycol 3350 17 Gram(s) Oral daily  potassium chloride    Tablet ER 40 milliEquivalent(s) Oral once  senna 2 Tablet(s) Oral at bedtime  spironolactone 25 milliGRAM(s) Oral daily      LABS: All Labs Reviewed:                        9.5    9.59  )-----------( 348      ( 14 Aug 2019 08:41 )             30.4     08-14    139  |  100  |  69<H>  ----------------------------<  237<H>  3.4<L>   |  22  |  1.91<H>    Ca    9.7      14 Aug 2019 06:23  Phos  3.3     08-14  Mg     2.3     08-14            Blood Culture:   Urine Culture      RADIOLOGY/EKG:    ASSESSMENT AND PLAN:  74 yo woman with PMHx of HTN, T2DM (A1c 7.4%), CAD s/p CABG (LIMA to LAD, SVG to OM, SVG to PDA 2014 at [facility]), non-dilated ICM (EF 20-25%), severe mitral regurgitation s/p mitral clip (6/13/19 Dr. Newman), severe pulm HTN, CKD, hypothyroidism, who is presenting to ED after experiencing worsening SOB and intermittent chest pain for 1 week at [facility] Of note, pt was recently discharged on 6/19 after having mitral clip procedure completed. She initially required BiPAP with improvement in her respiratory status following diuresis. Initiated on vasopressors and inotropes in CCU, which were subsequently weaned off. Infectious work up was negative.    #HFrEF likely 2/2 ICM with MR s/p alonso clip  -  continue with Bumex 3 mg twice daily  spoke with the team to discuss with cardiology  - Will likely switch back to torsemide 100 mg daily    -    - continue hydralazine 50 mg TID and isordil 30 TID  - continue spironolactone 25 mg  - continue Metoprolol Succinate 25 mg PO Daily   - No further cardiac testing at this time.      #CAD  - continue ASA and statin   - Pt with chest pain, but also with end stage cardiomyopathy, coronary disease, had MitraClip and no further intervention feasible. She should not have trops checked.    #SVT - likely Atrial Tach vs. Atrial Flutter   - Amio initially not given due to elevated LFTs likely in the setting of congestion.   - recheck LFTs.   - Monitor on tele.   - continue metoprolol at current dose as above  - If pt has SVT again can consider amio   #diabetes on  88   unit  Lantus and 	 and regular insulin 3 times a day at night she is receiving 46 units secondary to elevated hyperglycemia at bedtime she is using total of 208 units of insulin will ask Endo if can  her insulin can be adjusted for different kind and can be used only twice a day   discussed with NP   DVT PPX:    ADVANCED DIRECTIVE:    DISPOSITION: discharge home in a.m. and had a long discussion with the daughter on the phone and endocrinologist about her brittle diabetes and resistant to insulin hopefully been kneeling treatment will helpful  Her daughter are aware of high risk for readmission and intubation at home but she is willing to take the risk and take her home

## 2019-08-14 NOTE — PROVIDER CONTACT NOTE (OTHER) - ACTION/TREATMENT ORDERED:
Provider aware and acknowledged. Will order lytes for AM bloodwork and administer Metoprolol 25mg PO as scheduled. No other interventions at this time. Will continue to monitor.

## 2019-08-14 NOTE — PROGRESS NOTE ADULT - SUBJECTIVE AND OBJECTIVE BOX
Endocrinology Attending Covering for Dr. Banks      Chief complaint  Patient is a 73y old  Female who presents with a chief complaint of Hypoxia, SOB (14 Aug 2019 09:46)   Review of systems  Patient in bed, looks comfortable, no fever,  had no hypoglycemia.    Labs and Fingersticks  CAPILLARY BLOOD GLUCOSE      POCT Blood Glucose.: 329 mg/dL (14 Aug 2019 07:35)  POCT Blood Glucose.: 235 mg/dL (13 Aug 2019 21:03)  POCT Blood Glucose.: 246 mg/dL (13 Aug 2019 16:30)  POCT Blood Glucose.: 328 mg/dL (13 Aug 2019 11:47)      Anion Gap, Serum: 17 (08-14 @ 06:23)  Anion Gap, Serum: 18 <H> (08-13 @ 06:18)      Calcium, Total Serum: 9.7 (08-14 @ 06:23)  Calcium, Total Serum: 10.0 (08-13 @ 06:18)          08-14    139  |  100  |  69<H>  ----------------------------<  237<H>  3.4<L>   |  22  |  1.91<H>    Ca    9.7      14 Aug 2019 06:23  Phos  3.3     08-14  Mg     2.3     08-14                          9.5    9.59  )-----------( 348      ( 14 Aug 2019 08:41 )             30.4     Medications  MEDICATIONS  (STANDING):  acetaminophen  IVPB .. 1000 milliGRAM(s) IV Intermittent once  aspirin enteric coated 81 milliGRAM(s) Oral daily  atorvastatin 40 milliGRAM(s) Oral at bedtime  buMETAnide 3 milliGRAM(s) Oral every 12 hours  chlorhexidine 2% Cloths 1 Application(s) Topical daily  clopidogrel Tablet 75 milliGRAM(s) Oral daily  dextrose 5%. 1000 milliLiter(s) (50 mL/Hr) IV Continuous <Continuous>  dextrose 50% Injectable 12.5 Gram(s) IV Push once  dextrose 50% Injectable 25 Gram(s) IV Push once  dextrose 50% Injectable 25 Gram(s) IV Push once  docusate sodium 100 milliGRAM(s) Oral two times a day  heparin  Injectable 5000 Unit(s) SubCutaneous every 12 hours  hydrALAZINE 50 milliGRAM(s) Oral every 8 hours  insulin glargine Injectable (LANTUS) 88 Unit(s) SubCutaneous at bedtime  insulin lispro (HumaLOG) corrective regimen sliding scale   SubCutaneous three times a day before meals  insulin lispro (HumaLOG) corrective regimen sliding scale   SubCutaneous at bedtime  insulin lispro Injectable (HumaLOG) 46 Unit(s) SubCutaneous before dinner  insulin lispro Injectable (HumaLOG) 42 Unit(s) SubCutaneous before lunch  insulin lispro Injectable (HumaLOG) 42 Unit(s) SubCutaneous before breakfast  isosorbide   dinitrate Tablet (ISORDIL) 30 milliGRAM(s) Oral three times a day  levothyroxine 50 MICROGram(s) Oral daily  lidocaine   Patch 1 Patch Transdermal daily  melatonin 3 milliGRAM(s) Oral at bedtime  metoprolol succinate ER 25 milliGRAM(s) Oral daily  montelukast 10 milliGRAM(s) Oral daily  Nephro-enrico 1 Tablet(s) Oral daily  pantoprazole    Tablet 40 milliGRAM(s) Oral before breakfast  polyethylene glycol 3350 17 Gram(s) Oral daily  potassium chloride    Tablet ER 40 milliEquivalent(s) Oral once  senna 2 Tablet(s) Oral at bedtime  spironolactone 25 milliGRAM(s) Oral daily      Physical Exam  General: Patient comfortable in bed  Vital Signs Last 12 Hrs  T(F): 97.9 (08-14-19 @ 04:22), Max: 98.7 (08-14-19 @ 02:27)  HR: 82 (08-14-19 @ 05:23) (80 - 82)  BP: 118/63 (08-14-19 @ 05:23) (105/61 - 118/63)  BP(mean): --  RR: 28 (08-14-19 @ 04:22) (18 - 28)  SpO2: 100% (08-14-19 @ 04:22) (96% - 100%)  Neck: No palpable thyroid nodules.  CVS: S1S2, No murmurs  Respiratory: No wheezing, no crepitations  GI: Abdomen soft, bowel sounds positive  Musculoskeletal:  edema lower extremities.   Skin: No skin rashes, no ecchymosis    Diagnostics

## 2019-08-15 LAB
ANION GAP SERPL CALC-SCNC: 16 MMOL/L — SIGNIFICANT CHANGE UP (ref 5–17)
BUN SERPL-MCNC: 68 MG/DL — HIGH (ref 7–23)
CALCIUM SERPL-MCNC: 9.8 MG/DL — SIGNIFICANT CHANGE UP (ref 8.4–10.5)
CHLORIDE SERPL-SCNC: 102 MMOL/L — SIGNIFICANT CHANGE UP (ref 96–108)
CO2 SERPL-SCNC: 21 MMOL/L — LOW (ref 22–31)
CREAT SERPL-MCNC: 2 MG/DL — HIGH (ref 0.5–1.3)
GLUCOSE BLDC GLUCOMTR-MCNC: 236 MG/DL — HIGH (ref 70–99)
GLUCOSE BLDC GLUCOMTR-MCNC: 257 MG/DL — HIGH (ref 70–99)
GLUCOSE BLDC GLUCOMTR-MCNC: 257 MG/DL — HIGH (ref 70–99)
GLUCOSE BLDC GLUCOMTR-MCNC: 302 MG/DL — HIGH (ref 70–99)
GLUCOSE SERPL-MCNC: 213 MG/DL — HIGH (ref 70–99)
MAGNESIUM SERPL-MCNC: 2.2 MG/DL — SIGNIFICANT CHANGE UP (ref 1.6–2.6)
POTASSIUM SERPL-MCNC: 4.1 MMOL/L — SIGNIFICANT CHANGE UP (ref 3.5–5.3)
POTASSIUM SERPL-SCNC: 4.1 MMOL/L — SIGNIFICANT CHANGE UP (ref 3.5–5.3)
SODIUM SERPL-SCNC: 139 MMOL/L — SIGNIFICANT CHANGE UP (ref 135–145)

## 2019-08-15 RX ORDER — INSULIN GLARGINE 100 [IU]/ML
52 INJECTION, SOLUTION SUBCUTANEOUS EVERY MORNING
Refills: 0 | Status: DISCONTINUED | OUTPATIENT
Start: 2019-08-15 | End: 2019-08-19

## 2019-08-15 RX ORDER — INSULIN GLARGINE 100 [IU]/ML
60 INJECTION, SOLUTION SUBCUTANEOUS AT BEDTIME
Refills: 0 | Status: DISCONTINUED | OUTPATIENT
Start: 2019-08-15 | End: 2019-08-17

## 2019-08-15 RX ORDER — INSULIN LISPRO 100/ML
44 VIAL (ML) SUBCUTANEOUS
Refills: 0 | Status: DISCONTINUED | OUTPATIENT
Start: 2019-08-15 | End: 2019-08-19

## 2019-08-15 RX ORDER — INSULIN LISPRO 100/ML
50 VIAL (ML) SUBCUTANEOUS
Refills: 0 | Status: DISCONTINUED | OUTPATIENT
Start: 2019-08-15 | End: 2019-08-19

## 2019-08-15 RX ADMIN — Medication 3: at 08:21

## 2019-08-15 RX ADMIN — Medication 50 MILLIGRAM(S): at 21:15

## 2019-08-15 RX ADMIN — MONTELUKAST 10 MILLIGRAM(S): 4 TABLET, CHEWABLE ORAL at 12:02

## 2019-08-15 RX ADMIN — LIDOCAINE 1 PATCH: 4 CREAM TOPICAL at 09:42

## 2019-08-15 RX ADMIN — Medication 50 MILLIGRAM(S): at 06:20

## 2019-08-15 RX ADMIN — HEPARIN SODIUM 5000 UNIT(S): 5000 INJECTION INTRAVENOUS; SUBCUTANEOUS at 06:20

## 2019-08-15 RX ADMIN — Medication 25 MILLIGRAM(S): at 06:20

## 2019-08-15 RX ADMIN — Medication 42 UNIT(S): at 12:01

## 2019-08-15 RX ADMIN — INSULIN GLARGINE 60 UNIT(S): 100 INJECTION, SOLUTION SUBCUTANEOUS at 21:16

## 2019-08-15 RX ADMIN — LIDOCAINE 1 PATCH: 4 CREAM TOPICAL at 21:16

## 2019-08-15 RX ADMIN — Medication 3 MILLIGRAM(S): at 21:15

## 2019-08-15 RX ADMIN — Medication 81 MILLIGRAM(S): at 12:01

## 2019-08-15 RX ADMIN — TRAMADOL HYDROCHLORIDE 25 MILLIGRAM(S): 50 TABLET ORAL at 13:01

## 2019-08-15 RX ADMIN — ISOSORBIDE DINITRATE 30 MILLIGRAM(S): 5 TABLET ORAL at 21:15

## 2019-08-15 RX ADMIN — POLYETHYLENE GLYCOL 3350 17 GRAM(S): 17 POWDER, FOR SOLUTION ORAL at 12:02

## 2019-08-15 RX ADMIN — Medication 100 MILLIGRAM(S): at 17:07

## 2019-08-15 RX ADMIN — CHLORHEXIDINE GLUCONATE 1 APPLICATION(S): 213 SOLUTION TOPICAL at 21:15

## 2019-08-15 RX ADMIN — SPIRONOLACTONE 25 MILLIGRAM(S): 25 TABLET, FILM COATED ORAL at 06:20

## 2019-08-15 RX ADMIN — SENNA PLUS 2 TABLET(S): 8.6 TABLET ORAL at 21:15

## 2019-08-15 RX ADMIN — LIDOCAINE 1 PATCH: 4 CREAM TOPICAL at 07:21

## 2019-08-15 RX ADMIN — Medication 1 TABLET(S): at 12:01

## 2019-08-15 RX ADMIN — BUMETANIDE 3 MILLIGRAM(S): 0.25 INJECTION INTRAMUSCULAR; INTRAVENOUS at 06:20

## 2019-08-15 RX ADMIN — ATORVASTATIN CALCIUM 40 MILLIGRAM(S): 80 TABLET, FILM COATED ORAL at 21:15

## 2019-08-15 RX ADMIN — TRAMADOL HYDROCHLORIDE 25 MILLIGRAM(S): 50 TABLET ORAL at 12:01

## 2019-08-15 RX ADMIN — Medication 50 MICROGRAM(S): at 06:20

## 2019-08-15 RX ADMIN — BUMETANIDE 3 MILLIGRAM(S): 0.25 INJECTION INTRAMUSCULAR; INTRAVENOUS at 17:07

## 2019-08-15 RX ADMIN — ISOSORBIDE DINITRATE 30 MILLIGRAM(S): 5 TABLET ORAL at 13:28

## 2019-08-15 RX ADMIN — CLOPIDOGREL BISULFATE 75 MILLIGRAM(S): 75 TABLET, FILM COATED ORAL at 12:02

## 2019-08-15 RX ADMIN — HEPARIN SODIUM 5000 UNIT(S): 5000 INJECTION INTRAVENOUS; SUBCUTANEOUS at 17:07

## 2019-08-15 RX ADMIN — ISOSORBIDE DINITRATE 30 MILLIGRAM(S): 5 TABLET ORAL at 06:20

## 2019-08-15 RX ADMIN — Medication 50 MILLIGRAM(S): at 13:28

## 2019-08-15 RX ADMIN — Medication 4: at 12:01

## 2019-08-15 RX ADMIN — PANTOPRAZOLE SODIUM 40 MILLIGRAM(S): 20 TABLET, DELAYED RELEASE ORAL at 06:20

## 2019-08-15 RX ADMIN — Medication 0.25 MILLIGRAM(S): at 14:25

## 2019-08-15 RX ADMIN — Medication 50 UNIT(S): at 17:06

## 2019-08-15 RX ADMIN — Medication 42 UNIT(S): at 08:21

## 2019-08-15 RX ADMIN — Medication 100 MILLIGRAM(S): at 06:20

## 2019-08-15 RX ADMIN — Medication 3: at 17:06

## 2019-08-15 NOTE — PROGRESS NOTE ADULT - SUBJECTIVE AND OBJECTIVE BOX
[facility]   MD GONZALEZ SHAH D.O, PA    TELEPHONE NUMBERS:  OFFICE: [phone]  DR. SANTOYO CELL: [phone]  JUSTEN FIELD CELL: [phone]  DR. RIDER CELL:  [phone]    RENAL FOLLOW UP NOTE  --------------------------------------------------------------------------------  HPI: Pt seen and examined at bedside. Pt feels well today  Denies SOB, chest pain     PAST HISTORY  --------------------------------------------------------------------------------  No significant changes to PMH, PSH, FHx, SHx, unless otherwise noted    ALLERGIES & MEDICATIONS  --------------------------------------------------------------------------------  Allergies    azithromycin (Hives; Pruritus)    Intolerances      Standing Inpatient Medications  acetaminophen  IVPB .. 1000 milliGRAM(s) IV Intermittent once  aspirin enteric coated 81 milliGRAM(s) Oral daily  atorvastatin 40 milliGRAM(s) Oral at bedtime  buMETAnide 3 milliGRAM(s) Oral every 12 hours  chlorhexidine 2% Cloths 1 Application(s) Topical daily  clopidogrel Tablet 75 milliGRAM(s) Oral daily  dextrose 5%. 1000 milliLiter(s) IV Continuous <Continuous>  dextrose 50% Injectable 12.5 Gram(s) IV Push once  dextrose 50% Injectable 25 Gram(s) IV Push once  dextrose 50% Injectable 25 Gram(s) IV Push once  docusate sodium 100 milliGRAM(s) Oral two times a day  heparin  Injectable 5000 Unit(s) SubCutaneous every 12 hours  hydrALAZINE 50 milliGRAM(s) Oral every 8 hours  insulin glargine Injectable (LANTUS) 88 Unit(s) SubCutaneous at bedtime  insulin lispro (HumaLOG) corrective regimen sliding scale   SubCutaneous three times a day before meals  insulin lispro (HumaLOG) corrective regimen sliding scale   SubCutaneous at bedtime  insulin lispro Injectable (HumaLOG) 46 Unit(s) SubCutaneous before dinner  insulin lispro Injectable (HumaLOG) 42 Unit(s) SubCutaneous before lunch  insulin lispro Injectable (HumaLOG) 42 Unit(s) SubCutaneous before breakfast  isosorbide   dinitrate Tablet (ISORDIL) 30 milliGRAM(s) Oral three times a day  levothyroxine 50 MICROGram(s) Oral daily  lidocaine   Patch 1 Patch Transdermal daily  melatonin 3 milliGRAM(s) Oral at bedtime  metoprolol succinate ER 25 milliGRAM(s) Oral daily  montelukast 10 milliGRAM(s) Oral daily  Nephro-enrico 1 Tablet(s) Oral daily  pantoprazole    Tablet 40 milliGRAM(s) Oral before breakfast  polyethylene glycol 3350 17 Gram(s) Oral daily  senna 2 Tablet(s) Oral at bedtime  spironolactone 25 milliGRAM(s) Oral daily    PRN Inpatient Medications  acetaminophen   Tablet .. 650 milliGRAM(s) Oral every 6 hours PRN  ALPRAZolam 0.25 milliGRAM(s) Oral three times a day PRN  dextrose 40% Gel 15 Gram(s) Oral once PRN  glucagon  Injectable 1 milliGRAM(s) IntraMuscular once PRN  traMADol 25 milliGRAM(s) Oral every 6 hours PRN      REVIEW OF SYSTEMS  --------------------------------------------------------------------------------  General: no fever  CVS: no chest pain  RESP: no sob, no cough  ABD: no abdominal pain  : no dysuria,  MSK: no edema     VITALS/PHYSICAL EXAM  --------------------------------------------------------------------------------  T(C): 36.7 (08-15-19 @ 01:44), Max: 36.9 (08-14-19 @ 11:22)  HR: 92 (08-15-19 @ 06:19) (77 - 92)  BP: 124/68 (08-15-19 @ 06:19) (103/60 - 131/71)  RR: 17 (08-15-19 @ 01:44) (17 - 22)  SpO2: 96% (08-15-19 @ 01:44) (96% - 99%)  Wt(kg): --        08-14-19 @ 07:01  -  08-15-19 @ 07:00  --------------------------------------------------------  IN: 900 mL / OUT: 1175 mL / NET: -275 mL    Physical Exam:  	Gen: NAD  	HEENT: MMMARY  	Pulm: Course BS B/L  	CV: S1S2  	Abd: Soft, +BS  	Ext: No LE edema B/L                      Neuro: Awake   	Skin: Warm and Dry     LABS/STUDIES  --------------------------------------------------------------------------------              9.5    9.59  >-----------<  348      [08-14-19 @ 08:41]              30.4     139  |  102  |  68  ----------------------------<  213      [08-15-19 @ 06:26]  4.1   |  21  |  2.00        Ca     9.8     [08-15-19 @ 06:26]      Mg     2.2     [08-15-19 @ 06:26]      Phos  3.3     [08-14-19 @ 06:23]    Creatinine Trend:  SCr 2.00 [08-15 @ 06:26]  SCr 1.91 [08-14 @ 06:23]  SCr 2.18 [08-13 @ 06:18]  SCr 1.86 [08-12 @ 06:13]  SCr 1.86 [08-11 @ 06:43]    Iron 42, TIBC 348, %sat 12      [07-05-19 @ 22:32]  Ferritin 130      [07-05-19 @ 22:32]  .4 (Ca --)      [04-25-19 @ 05:45]   --  PTH 43.55 (Ca --)      [09-10-18 @ 07:15]   --  PTH 36.60 (Ca --)      [09-08-18 @ 03:15]   --  Vitamin D (25OH) 25.9      [05-01-19 @ 04:00]  HbA1c 7.4      [07-05-19 @ 22:32]  TSH 2.00      [07-05-19 @ 22:32]  Lipid: chol 148, , HDL 35,       [06-01-19 @ 08:00]

## 2019-08-15 NOTE — CHART NOTE - NSCHARTNOTEFT_GEN_A_CORE
Upon Nutritional Assessment by the Registered Dietitian your patient was determined to meet criteria / has evidence of the following diagnosis/diagnoses:          [ ]  Mild Protein Calorie Malnutrition        [x]  Moderate Protein Calorie Malnutrition        [ ] Severe Protein Calorie Malnutrition        [ ] Unspecified Protein Calorie Malnutrition        [ ] Underweight / BMI <19        [ ] Morbid Obesity / BMI > 40      Findings as based on:  [x] Comprehensive nutrition assessment   [x] Nutrition Focused Physical Exam  [x] Other: po intake meeting 20-85% x ~10 days with variable po intake noted since earlier in admission as well, ~4.4 kG overall wt loss over the course of pt hospitalization x 1 month, muscle wasting, debility in setting of extended hospitalization and end stage cardiomyopathy noted    Nutrition Plan/Recommendations:    1) Continue to recommend liberalizing diet to Consistent Carbohydrate (with evening snacks) + Low Sodium + Halal. Fluids deferred to team. Promote adequate BG control, recommend adjust insulin as needed.  2) Recommend discontinue glucerna supplement as pt states she is not drinking it. Continue to provide [employer] Shake 1x/daily.  3) Continue current renal multivitamin as indicated.  4) Provide nutrition education to pt family members/care givers as feasible for promoting adequate BG control and promoting adequate protein-energy intake.    RD remains available. Janneth Wang RD, CDN Pager: [phone].      PROVIDER Section:     By signing this assessment you are acknowledging and agree with the diagnosis/diagnoses assigned by the Registered Dietitian    Comments:

## 2019-08-15 NOTE — PROGRESS NOTE ADULT - SUBJECTIVE AND OBJECTIVE BOX
CHIEF COMPLAINT:    SUBJECTIVE:     REVIEW OF SYSTEMS:    CONSTITUTIONAL: (  )  weakness,  (  ) fevers or chills  EYES/ENT: (  )visual changes;     NECK: (  ) pain or stiffness  RESPIRATORY:   (  )cough, wheezing, hemoptysis;  (  ) shortness of breath  CARDIOVASCULAR:  (  )chest pain or palpitations  GASTROINTESTINAL:   (  )abdominal or epigastric pain.  (  ) nausea, vomiting, or hematemesis;   (   ) diarrhea or constipation.   GENITOURINARY:   (    ) dysuria, frequency or hematuria  NEUROLOGICAL:  (   ) numbness or weakness   All other review of systems is negative unless indicated above    Vital Signs Last 24 Hrs  T(C): 36.7 (15 Aug 2019 01:44), Max: 36.9 (14 Aug 2019 11:22)  T(F): 98.1 (15 Aug 2019 01:44), Max: 98.5 (14 Aug 2019 11:22)  HR: 92 (15 Aug 2019 06:19) (77 - 92)  BP: 124/68 (15 Aug 2019 06:19) (103/60 - 131/71)  BP(mean): --  RR: 17 (15 Aug 2019 01:44) (17 - 22)  SpO2: 96% (15 Aug 2019 01:44) (96% - 99%)    I&O's Summary    14 Aug 2019 07:01  -  15 Aug 2019 07:00  --------------------------------------------------------  IN: 900 mL / OUT: 1175 mL / NET: -275 mL        CAPILLARY BLOOD GLUCOSE      POCT Blood Glucose.: 257 mg/dL (15 Aug 2019 07:38)  POCT Blood Glucose.: 376 mg/dL (14 Aug 2019 21:11)  POCT Blood Glucose.: 364 mg/dL (14 Aug 2019 16:37)  POCT Blood Glucose.: 317 mg/dL (14 Aug 2019 11:36)      PHYSICAL EXAM:    Constitutional:  (   ) NAD,   (   )awake and alert  HEENT: PERR, EOMI,    Neck: Soft and supple, No LAD, No JVD  Respiratory:  (    Breath sounds are clear bilaterally,    (   ) wheezing, rales or rhonchi  Cardiovascular:     (   )S1 and S2, regular rate and rhythm, no Murmurs, gallops or rubs  Gastrointestinal:  (   )Bowel Sounds present, soft,   (  )nontender, nondistended,    Extremities:    (  ) peripheral edema  Vascular: 2+ peripheral pulses  Neurological:    (    )A/O x 3,   (  ) focal deficits  Musculoskeletal:    (   )  normal strength b/l upper  (     ) normal  lower extremities  Skin: No rashes    MEDICATIONS:  MEDICATIONS  (STANDING):  acetaminophen  IVPB .. 1000 milliGRAM(s) IV Intermittent once  aspirin enteric coated 81 milliGRAM(s) Oral daily  atorvastatin 40 milliGRAM(s) Oral at bedtime  buMETAnide 3 milliGRAM(s) Oral every 12 hours  chlorhexidine 2% Cloths 1 Application(s) Topical daily  clopidogrel Tablet 75 milliGRAM(s) Oral daily  dextrose 5%. 1000 milliLiter(s) (50 mL/Hr) IV Continuous <Continuous>  dextrose 50% Injectable 12.5 Gram(s) IV Push once  dextrose 50% Injectable 25 Gram(s) IV Push once  dextrose 50% Injectable 25 Gram(s) IV Push once  docusate sodium 100 milliGRAM(s) Oral two times a day  heparin  Injectable 5000 Unit(s) SubCutaneous every 12 hours  hydrALAZINE 50 milliGRAM(s) Oral every 8 hours  insulin glargine Injectable (LANTUS) 88 Unit(s) SubCutaneous at bedtime  insulin lispro (HumaLOG) corrective regimen sliding scale   SubCutaneous three times a day before meals  insulin lispro (HumaLOG) corrective regimen sliding scale   SubCutaneous at bedtime  insulin lispro Injectable (HumaLOG) 46 Unit(s) SubCutaneous before dinner  insulin lispro Injectable (HumaLOG) 42 Unit(s) SubCutaneous before lunch  insulin lispro Injectable (HumaLOG) 42 Unit(s) SubCutaneous before breakfast  isosorbide   dinitrate Tablet (ISORDIL) 30 milliGRAM(s) Oral three times a day  levothyroxine 50 MICROGram(s) Oral daily  lidocaine   Patch 1 Patch Transdermal daily  melatonin 3 milliGRAM(s) Oral at bedtime  metoprolol succinate ER 25 milliGRAM(s) Oral daily  montelukast 10 milliGRAM(s) Oral daily  Nephro-enrico 1 Tablet(s) Oral daily  pantoprazole    Tablet 40 milliGRAM(s) Oral before breakfast  polyethylene glycol 3350 17 Gram(s) Oral daily  senna 2 Tablet(s) Oral at bedtime  spironolactone 25 milliGRAM(s) Oral daily      LABS: All Labs Reviewed:                        9.5    9.59  )-----------( 348      ( 14 Aug 2019 08:41 )             30.4     08-15    139  |  102  |  68<H>  ----------------------------<  213<H>  4.1   |  21<L>  |  2.00<H>    Ca    9.8      15 Aug 2019 06:26  Phos  3.3     08-14  Mg     2.2     08-15            Blood Culture:   Urine Culture      RADIOLOGY/EKG:    ASSESSMENT AND PLAN:    DVT PPX:    ADVANCED DIRECTIVE:    DISPOSITION: CHIEF COMPLAINT:  patient laying in the bed and awakened by her bowel without S OB  in spite of increasing her Lantus is still hyperglycemic    SUBJECTIVE:     REVIEW OF SYSTEMS:    CONSTITUTIONAL: ( x )  weakness,  (  ) fevers or chills  EYES/ENT: (  )visual changes;     NECK: (  ) pain or stiffness  RESPIRATORY:   (  )cough, wheezing, hemoptysis;  (  ) shortness of breath  CARDIOVASCULAR:  (  )chest pain or palpitations  GASTROINTESTINAL:   (  )abdominal or epigastric pain.  (  ) nausea, vomiting, or hematemesis;   (   ) diarrhea or constipation.   GENITOURINARY:   (    ) dysuria, frequency or hematuria  NEUROLOGICAL:  (   ) numbness or weakness   All other review of systems is negative unless indicated above    Vital Signs Last 24 Hrs  T(C): 36.7 (15 Aug 2019 01:44), Max: 36.9 (14 Aug 2019 11:22)  T(F): 98.1 (15 Aug 2019 01:44), Max: 98.5 (14 Aug 2019 11:22)  HR: 92 (15 Aug 2019 06:19) (77 - 92)  BP: 124/68 (15 Aug 2019 06:19) (103/60 - 131/71)  BP(mean): --  RR: 17 (15 Aug 2019 01:44) (17 - 22)  SpO2: 96% (15 Aug 2019 01:44) (96% - 99%)    I&O's Summary    14 Aug 2019 07:01  -  15 Aug 2019 07:00  --------------------------------------------------------  IN: 900 mL / OUT: 1175 mL / NET: -275 mL        CAPILLARY BLOOD GLUCOSE      POCT Blood Glucose.: 257 mg/dL (15 Aug 2019 07:38)  POCT Blood Glucose.: 376 mg/dL (14 Aug 2019 21:11)  POCT Blood Glucose.: 364 mg/dL (14 Aug 2019 16:37)  POCT Blood Glucose.: 317 mg/dL (14 Aug 2019 11:36)      PHYSICAL EXAM:    Constitutional:  (  x ) NAD,   (   )awake and alert  HEENT: PERR, EOMI,    Neck: Soft and supple, No LAD, No JVD  Respiratory:  (   x Breath sounds are clear bilaterally,    (   ) wheezing, rales or rhonchi  Cardiovascular:     ( x  )S1 and S2, regular rate    Gastrointestinal:  (   )Bowel Sounds present, soft,   (  )nontender, nondistended,    Extremities:    (  ) peripheral edema  Vascular: 2+ peripheral pulses  Neurological:    (   x )A/O x 3,   (  ) focal deficits  Musculoskeletal:    ( x  )  normal strength b/l upper  (     ) normal  lower extremities  Skin: No rashes    MEDICATIONS:  MEDICATIONS  (STANDING):  acetaminophen  IVPB .. 1000 milliGRAM(s) IV Intermittent once  aspirin enteric coated 81 milliGRAM(s) Oral daily  atorvastatin 40 milliGRAM(s) Oral at bedtime  buMETAnide 3 milliGRAM(s) Oral every 12 hours  chlorhexidine 2% Cloths 1 Application(s) Topical daily  clopidogrel Tablet 75 milliGRAM(s) Oral daily  dextrose 5%. 1000 milliLiter(s) (50 mL/Hr) IV Continuous <Continuous>  dextrose 50% Injectable 12.5 Gram(s) IV Push once  dextrose 50% Injectable 25 Gram(s) IV Push once  dextrose 50% Injectable 25 Gram(s) IV Push once  docusate sodium 100 milliGRAM(s) Oral two times a day  heparin  Injectable 5000 Unit(s) SubCutaneous every 12 hours  hydrALAZINE 50 milliGRAM(s) Oral every 8 hours  insulin glargine Injectable (LANTUS) 88 Unit(s) SubCutaneous at bedtime  insulin lispro (HumaLOG) corrective regimen sliding scale   SubCutaneous three times a day before meals  insulin lispro (HumaLOG) corrective regimen sliding scale   SubCutaneous at bedtime  insulin lispro Injectable (HumaLOG) 46 Unit(s) SubCutaneous before dinner  insulin lispro Injectable (HumaLOG) 42 Unit(s) SubCutaneous before lunch  insulin lispro Injectable (HumaLOG) 42 Unit(s) SubCutaneous before breakfast  isosorbide   dinitrate Tablet (ISORDIL) 30 milliGRAM(s) Oral three times a day  levothyroxine 50 MICROGram(s) Oral daily  lidocaine   Patch 1 Patch Transdermal daily  melatonin 3 milliGRAM(s) Oral at bedtime  metoprolol succinate ER 25 milliGRAM(s) Oral daily  montelukast 10 milliGRAM(s) Oral daily  Nephro-enrico 1 Tablet(s) Oral daily  pantoprazole    Tablet 40 milliGRAM(s) Oral before breakfast  polyethylene glycol 3350 17 Gram(s) Oral daily  senna 2 Tablet(s) Oral at bedtime  spironolactone 25 milliGRAM(s) Oral daily      LABS: All Labs Reviewed:                        9.5    9.59  )-----------( 348      ( 14 Aug 2019 08:41 )             30.4     08-15    139  |  102  |  68<H>  ----------------------------<  213<H>  4.1   |  21<L>  |  2.00<H>    Ca    9.8      15 Aug 2019 06:26  Phos  3.3     08-14  Mg     2.2     08-15            Blood Culture:   Urine Culture      RADIOLOGY/EKG:    ASSESSMENT AND PLAN:  74 yo woman with PMHx of HTN, T2DM (A1c 7.4%), CAD s/p CABG (LIMA to LAD, SVG to OM, SVG to PDA 2014 at [facility]), non-dilated ICM (EF 20-25%), severe mitral regurgitation s/p mitral clip (6/13/19 Dr. Newman), severe pulm HTN, CKD, hypothyroidism, who is presenting to ED after experiencing worsening SOB and intermittent chest pain for 1 week at [facility]. Of note, pt was recently discharged on 6/19 after having mitral clip procedure completed. She initially required BiPAP with improvement in her respiratory status following diuresis. Initiated on vasopressors and inotropes in CCU, which were subsequently weaned off. Infectious work up was negative.    #HFrEF likely 2/2 ICM with MR s/p alonso clip  -  continue with Bumex 3 mg twice daily   discuss with cardiology  - Will likely switch back to torsemide 100 mg daily    -    - continue hydralazine 50 mg TID and isordil 30 TID  - continue spironolactone 25 mg  - continue Metoprolol Succinate 25 mg PO Daily   - No further cardiac testing at this time.      #CAD  - continue ASA and statin   - Pt with chest pain, but also with end stage cardiomyopathy, coronary disease, had MitraClip and no further intervention feasible. She should not have trops checked.    #SVT - likely Atrial Tach vs. Atrial Flutter   - Amio initially not given due to elevated LFTs likely in the setting of congestion.   - recheck LFTs.   - Monitor on tele.   - continue metoprolol at current dose as above  - If pt has SVT again can consider amio   #diabetes on  88   unit  Lantus and 	 and regular insulin 3 times a day at night she is receiving 46 units secondary to elevated hyperglycemia at bedtime she is using total of 208 units of insulin will ask Endo if can  her insulin can be adjusted for different kind and can be used only twice a day         DVT PPX:    ADVANCED DIRECTIVE:    DISPOSITION: initially patient supposed to be discharged today but after discussion with endocrinologist and persistent hyperglycemia will change her Lantus dose into a divided dose for better control so patient discharge planning today was canceled

## 2019-08-15 NOTE — CHART NOTE - NSCHARTNOTEFT_GEN_A_CORE
Nutrition Follow Up Note  Patient seen for: nutrition follow up on 6TOW    Chart reviewed, events noted. Pt is 72 yo F with PMHx of HTN, T2DM (last HgbA1c 7.4%), CAD s/p CABG,  non-dilated ICM (EF 20-25%), severe mitral regurgitation s/p mitral clip, severe pulm HTN, CKD, hypothyroidism, presented to ED [facility] +acute on chronic HF, euvolemic currently. MERVIN on CKD, renal function fluctuates, nephrology following.  Pt POCT elevated still at times, endocrinology following. End stage cardiomyopathy, no further interventions per chart. Palliative following.    Source: electronic medical record, pt (Welsh and English speaking-able to make needs known)    Diet : Consistent Carbohydrate (with evening snacks) + Low Fiber + Low Sodium +1500ml Fluid Restriction + Halal + Glucerna 1x/daily (provides additional 220kcal, 10g pro)     Patient reports: No acute GI distress reported. Pt states she feels good this AM. Last BM .    PO intake : pt with variable po intake noted still 20-85%, pt consumes food from home at times. Per RN, pt eating well overall. This AM, pt observed with >75% of breakfast tray consumed. Pt states again she is not drinking glucerna supplements and does not need them to be provided. Continue to provide 1 Sugar Free Health Shake daily.  Pt unable to participate in nutrition education in English and too weak too utilize translation phone for provision of verbal nutrition education at this time. RD remains available, no family at bedside at this time.     Source for PO intake: chart, pt     Enteral /Parenteral Nutrition: n/a    Daily Weight in k.5 (), pt wt previously stable, however now appears to be downtrending and no fluid retention noted recently. Noted with 4.4 kG (8.3%) wt loss x ~1 month. Unable to perform Nutrition focused physical exam at this time as pt eating breakfast, no obvious visual signs of muscle wasting or fat loss.   51.1 (-), 49 (), 51.5 (08-10), 52 (-), 52.2 (), 51.7 (), 52.7 (), 52.4 (), 52.3 (-16), 52.9 (-12)    Pertinent Medications: MEDICATIONS  (STANDING):  acetaminophen  IVPB .. 1000 milliGRAM(s) IV Intermittent once  aspirin enteric coated 81 milliGRAM(s) Oral daily  atorvastatin 40 milliGRAM(s) Oral at bedtime  buMETAnide 3 milliGRAM(s) Oral every 12 hours  chlorhexidine 2% Cloths 1 Application(s) Topical daily  clopidogrel Tablet 75 milliGRAM(s) Oral daily  dextrose 5%. 1000 milliLiter(s) (50 mL/Hr) IV Continuous <Continuous>  dextrose 50% Injectable 12.5 Gram(s) IV Push once  dextrose 50% Injectable 25 Gram(s) IV Push once  dextrose 50% Injectable 25 Gram(s) IV Push once  docusate sodium 100 milliGRAM(s) Oral two times a day  heparin  Injectable 5000 Unit(s) SubCutaneous every 12 hours  hydrALAZINE 50 milliGRAM(s) Oral every 8 hours  insulin glargine Injectable (LANTUS) 88 Unit(s) SubCutaneous at bedtime  insulin lispro (HumaLOG) corrective regimen sliding scale   SubCutaneous three times a day before meals  insulin lispro (HumaLOG) corrective regimen sliding scale   SubCutaneous at bedtime  insulin lispro Injectable (HumaLOG) 46 Unit(s) SubCutaneous before dinner  insulin lispro Injectable (HumaLOG) 42 Unit(s) SubCutaneous before lunch  insulin lispro Injectable (HumaLOG) 42 Unit(s) SubCutaneous before breakfast  isosorbide   dinitrate Tablet (ISORDIL) 30 milliGRAM(s) Oral three times a day  levothyroxine 50 MICROGram(s) Oral daily  lidocaine   Patch 1 Patch Transdermal daily  melatonin 3 milliGRAM(s) Oral at bedtime  metoprolol succinate ER 25 milliGRAM(s) Oral daily  montelukast 10 milliGRAM(s) Oral daily  Nephro-enrico 1 Tablet(s) Oral daily  pantoprazole    Tablet 40 milliGRAM(s) Oral before breakfast  polyethylene glycol 3350 17 Gram(s) Oral daily  senna 2 Tablet(s) Oral at bedtime  spironolactone 25 milliGRAM(s) Oral daily    MEDICATIONS  (PRN):  acetaminophen   Tablet .. 650 milliGRAM(s) Oral every 6 hours PRN Mild Pain (1 - 3), Moderate Pain (4 - 6)  ALPRAZolam 0.25 milliGRAM(s) Oral three times a day PRN anxiety  dextrose 40% Gel 15 Gram(s) Oral once PRN Blood Glucose LESS THAN 70 milliGRAM(s)/deciliter  glucagon  Injectable 1 milliGRAM(s) IntraMuscular once PRN Glucose LESS THAN 70 milligrams/deciliter  traMADol 25 milliGRAM(s) Oral every 6 hours PRN Moderate Pain (4 - 6)    Pertinent Labs: 08-15 @ 06:26: Na 139, BUN 68<H>, Cr 2.00<H>, <H>, K+ 4.1, Phos --, Mg 2.2, Alk Phos --, ALT/SGPT --, AST/SGOT --, HbA1c --    Finger Sticks:  POCT Blood Glucose.: 257 mg/dL (08-15 @ 07:38)  POCT Blood Glucose.: 376 mg/dL ( @ 21:11)  POCT Blood Glucose.: 364 mg/dL ( @ 16:37)  POCT Blood Glucose.: 317 mg/dL ( @ 11:36)      Skin per nursing documentation: no pressure injuries noted at this time  Edema: none noted    Estimated Needs:   [x] no change since previous assessment  [ ] recalculated:     Previous Nutrition Diagnosis: Predicted suboptimal energy intake  Nutrition Diagnosis is: ongoing, variable po intake noted, pt ordered for oral nutrition supplement, however states she is not drinking it. Pt medical condition precludes additional nutrition interventions at this time.    New Nutrition Diagnosis: n/a    Recommend  1) Recommend liberalize diet to Consistent Carbohydrate (with evening snacks) + Low Sodium + Halal. Fluids deferred to team. Promote adequate BG control, recommend adjust insulin as needed.  2) Recommend discontinue glucerna supplement as pt states she is not drinking it. Continue  health shakes.  3) Recommend consider addition of renal multivitamin  4) Monitor GOC.    Monitoring and Evaluation:     Continue to monitor Nutritional intake, Tolerance to diet prescription, weights, labs, skin integrity    RD remains available upon request and will follow up per protocol. Janneth Wang RD, CDN Pager: [phone]. Nutrition Follow Up Note  Patient seen for: nutrition follow up on 6TOW    Chart reviewed, events noted. Pt is 72 yo F with PMHx of HTN, T2DM (last HgbA1c 7.4%), CAD s/p CABG,  non-dilated ICM (EF 20-25%), severe mitral regurgitation s/p mitral clip, severe pulm HTN, CKD, hypothyroidism, presented [facility] ED [facility] +acute on chronic HF, euvolemic currently. MERVIN on CKD, renal function fluctuates, nephrology following.  Pt POCT elevated still at times, endocrinology following. End stage cardiomyopathy, no further interventions per chart. Palliative following.    Source: electronic medical record, pt (Serbian and English speaking-able to make needs known and answer RD questions appropriately)    Diet : Consistent Carbohydrate (with evening snacks) + Low Fiber + Low Sodium +1500ml Fluid Restriction + Halal + Glucerna 1x/daily (provides additional 220kcal, 10g pro)     Patient reports: No acute GI distress reported. Pt states she feels good this AM. Last BM .    PO intake : pt with variable po intake noted still 20-85%, pt consumes food from home at times. Per RN, pt eating well overall. This AM, pt observed with >75% of breakfast tray consumed. Pt states again she is not drinking glucerna supplements and does not need them to be provided. Continue to provide 1 Sugar Free Health Shake daily.  Pt unable to participate in extensive verbal nutrition education in English and too weak too utilize translation phone at this time. RD remains available, no family at bedside at this time.     Source for PO intake: chart, pt     Enteral /Parenteral Nutrition: n/a    Daily Weight in k.5 (), pt wt previously stable, however now appears to be downtrending and no fluid retention noted recently. Noted with 4.4 kG (8.3%) wt loss overall x ~1 month, appears most of which occurred within the past 10 days, ~3.7 kG (7.1%) lost since . Nutrition Focused Physical Exam performed with pt's verbal consent with the following findings: no overt signs of muscle wasting to temporal, clavicle, acromion process, however noted with moderate muscle wasting to calves. No overt signs of fat loss to orbital fat pads or triceps.  51.1 (), 49 (), 51.5 (08-10), 52 (), 52.2 (), 51.7 (), 52.7 (), 52.4 (), 52.3 (), 52.9 ()    Pertinent Medications: MEDICATIONS  (STANDING):  acetaminophen  IVPB .. 1000 milliGRAM(s) IV Intermittent once  aspirin enteric coated 81 milliGRAM(s) Oral daily  atorvastatin 40 milliGRAM(s) Oral at bedtime  buMETAnide 3 milliGRAM(s) Oral every 12 hours  chlorhexidine 2% Cloths 1 Application(s) Topical daily  clopidogrel Tablet 75 milliGRAM(s) Oral daily  dextrose 5%. 1000 milliLiter(s) (50 mL/Hr) IV Continuous <Continuous>  dextrose 50% Injectable 12.5 Gram(s) IV Push once  dextrose 50% Injectable 25 Gram(s) IV Push once  dextrose 50% Injectable 25 Gram(s) IV Push once  docusate sodium 100 milliGRAM(s) Oral two times a day  heparin  Injectable 5000 Unit(s) SubCutaneous every 12 hours  hydrALAZINE 50 milliGRAM(s) Oral every 8 hours  insulin glargine Injectable (LANTUS) 88 Unit(s) SubCutaneous at bedtime  insulin lispro (HumaLOG) corrective regimen sliding scale   SubCutaneous three times a day before meals  insulin lispro (HumaLOG) corrective regimen sliding scale   SubCutaneous at bedtime  insulin lispro Injectable (HumaLOG) 46 Unit(s) SubCutaneous before dinner  insulin lispro Injectable (HumaLOG) 42 Unit(s) SubCutaneous before lunch  insulin lispro Injectable (HumaLOG) 42 Unit(s) SubCutaneous before breakfast  isosorbide   dinitrate Tablet (ISORDIL) 30 milliGRAM(s) Oral three times a day  levothyroxine 50 MICROGram(s) Oral daily  lidocaine   Patch 1 Patch Transdermal daily  melatonin 3 milliGRAM(s) Oral at bedtime  metoprolol succinate ER 25 milliGRAM(s) Oral daily  montelukast 10 milliGRAM(s) Oral daily  Nephro-enrico 1 Tablet(s) Oral daily  pantoprazole    Tablet 40 milliGRAM(s) Oral before breakfast  polyethylene glycol 3350 17 Gram(s) Oral daily  senna 2 Tablet(s) Oral at bedtime  spironolactone 25 milliGRAM(s) Oral daily    MEDICATIONS  (PRN):  acetaminophen   Tablet .. 650 milliGRAM(s) Oral every 6 hours PRN Mild Pain (1 - 3), Moderate Pain (4 - 6)  ALPRAZolam 0.25 milliGRAM(s) Oral three times a day PRN anxiety  dextrose 40% Gel 15 Gram(s) Oral once PRN Blood Glucose LESS THAN 70 milliGRAM(s)/deciliter  glucagon  Injectable 1 milliGRAM(s) IntraMuscular once PRN Glucose LESS THAN 70 milligrams/deciliter  traMADol 25 milliGRAM(s) Oral every 6 hours PRN Moderate Pain (4 - 6)    Pertinent Labs: 08-15 @ 06:26: Na 139, BUN 68<H>, Cr 2.00<H>, <H>, K+ 4.1, Phos --, Mg 2.2, Alk Phos --, ALT/SGPT --, AST/SGOT --, HbA1c --    Finger Sticks:  POCT Blood Glucose.: 257 mg/dL (08-15 @ 07:38)  POCT Blood Glucose.: 376 mg/dL ( @ 21:11)  POCT Blood Glucose.: 364 mg/dL ( @ 16:37)  POCT Blood Glucose.: 317 mg/dL ( @ 11:36)      Skin per nursing documentation: no pressure injuries noted at this time  Edema: none noted    Estimated Needs:   [x] no change since previous assessment  [ ] recalculated:     Previous Nutrition Diagnosis: Predicted suboptimal energy intake  Nutrition Diagnosis is: ongoing, now escalated to malnutrition.    New Nutrition Diagnosis: Malnutrition, moderate (acute on chronic)  Related to: inadequate po intake at times in setting of extended hospitalization and end stage cardiomyopathy noted  As evidenced by: po intake meeting 20-85% x ~10 days with variable po intake noted since earlier in admission as well, ~4.4 kG overall wt loss over the course of pt hospitalization x 1 month, muscle wasting, debility    Interventions: Pt medical condition precludes aggressive nutrition interventions at this time. Oral nutrition supplements being provided and po intake does not seem to be declining further, but rather stabilizing around what is likely pt new baseline po intake. Also being addressed with renal multivitamin at this time.    Recommend  1) Continue to recommend liberalizing diet to Consistent Carbohydrate (with evening snacks) + Low Sodium + Halal. Fluids deferred to team. Promote adequate BG control, recommend adjust insulin as needed.  2) Recommend discontinue glucerna supplement as pt states she is not drinking it. Continue to provide SF Health Shake 1x/daily.  3) Continue current renal multivitamin as indicated.  4) Provide nutrition education to pt family members/care givers as feasible for promoting adequate BG control and promoting adequate protein-energy intake.    Monitoring and Evaluation:     Continue to monitor Nutritional intake, Tolerance to diet prescription, weights, labs, skin integrity    RD remains available upon request and will follow up per protocol. Janneth Wang RD, CDN Pager: [phone]. Nutrition Follow Up Note  Patient seen for: nutrition follow up on 6TOW    Chart reviewed, events noted. Pt is 72 yo F with PMHx of HTN, T2DM (last HgbA1c 7.4%), CAD s/p CABG,  non-dilated ICM (EF 20-25%), severe mitral regurgitation s/p mitral clip, severe pulm HTN, CKD, hypothyroidism, presented [facility] ED [facility] +acute on chronic HF, euvolemic currently. MERVIN on CKD, renal function fluctuates, nephrology following.  Pt POCT elevated still at times, endocrinology following. End stage cardiomyopathy, no further interventions per chart. Palliative following.    Source: electronic medical record, pt (Latvian and English speaking-able to make needs known and answer RD questions appropriately)    Diet : Consistent Carbohydrate (with evening snacks) + Low Fiber + Low Sodium +1500ml Fluid Restriction + Halal + Glucerna 1x/daily (provides additional 220kcal, 10g pro)     Patient reports: No acute GI distress reported. Pt states she feels good this AM. Last BM .    PO intake : pt with variable po intake noted still 20-85%, pt consumes food from home at times. Per RN, pt eating well overall. This AM, pt observed with >75% of breakfast tray consumed. Pt states again she is not drinking glucerna supplements and does not need them to be provided. Continue to provide 1 Sugar Free Health Shake daily.  Pt unable to participate in extensive verbal nutrition education in English and too weak too utilize translation phone at this time. RD remains available, no family at bedside at this time.     Source for PO intake: chart, pt     Enteral /Parenteral Nutrition: n/a    Daily Weight in k.5 (), pt wt previously stable, however now appears to be downtrending and no fluid retention noted recently. Noted with 4.4 kG (8.3%) wt loss overall x ~1 month, appears most of which occurred within the past 10 days, ~3.7 kG (7.1%) lost since . Nutrition Focused Physical Exam performed with pt's verbal consent with the following findings: no overt signs of muscle wasting to temporal, clavicle, acromion process, however noted with moderate muscle wasting to calves. No overt signs of fat loss to orbital fat pads or triceps.    51.1 (08-12), 49 (), 51.5 (-10), 52 (), 52.2 (), 51.7 (), 52.7 (), 52.4 (), 52.3 (), 52.9 ()    Pt height noted as 51", question accuracy. This height would indicate BMI 28.8 based on wt 48.6 kG. Per chart, previous height noted as 59" on 2019 admit and would indicate BMI 21.5 and is likely more accurate.     Pertinent Medications: MEDICATIONS  (STANDING):  acetaminophen  IVPB .. 1000 milliGRAM(s) IV Intermittent once  aspirin enteric coated 81 milliGRAM(s) Oral daily  atorvastatin 40 milliGRAM(s) Oral at bedtime  buMETAnide 3 milliGRAM(s) Oral every 12 hours  chlorhexidine 2% Cloths 1 Application(s) Topical daily  clopidogrel Tablet 75 milliGRAM(s) Oral daily  dextrose 5%. 1000 milliLiter(s) (50 mL/Hr) IV Continuous <Continuous>  dextrose 50% Injectable 12.5 Gram(s) IV Push once  dextrose 50% Injectable 25 Gram(s) IV Push once  dextrose 50% Injectable 25 Gram(s) IV Push once  docusate sodium 100 milliGRAM(s) Oral two times a day  heparin  Injectable 5000 Unit(s) SubCutaneous every 12 hours  hydrALAZINE 50 milliGRAM(s) Oral every 8 hours  insulin glargine Injectable (LANTUS) 88 Unit(s) SubCutaneous at bedtime  insulin lispro (HumaLOG) corrective regimen sliding scale   SubCutaneous three times a day before meals  insulin lispro (HumaLOG) corrective regimen sliding scale   SubCutaneous at bedtime  insulin lispro Injectable (HumaLOG) 46 Unit(s) SubCutaneous before dinner  insulin lispro Injectable (HumaLOG) 42 Unit(s) SubCutaneous before lunch  insulin lispro Injectable (HumaLOG) 42 Unit(s) SubCutaneous before breakfast  isosorbide   dinitrate Tablet (ISORDIL) 30 milliGRAM(s) Oral three times a day  levothyroxine 50 MICROGram(s) Oral daily  lidocaine   Patch 1 Patch Transdermal daily  melatonin 3 milliGRAM(s) Oral at bedtime  metoprolol succinate ER 25 milliGRAM(s) Oral daily  montelukast 10 milliGRAM(s) Oral daily  Nephro-enrico 1 Tablet(s) Oral daily  pantoprazole    Tablet 40 milliGRAM(s) Oral before breakfast  polyethylene glycol 3350 17 Gram(s) Oral daily  senna 2 Tablet(s) Oral at bedtime  spironolactone 25 milliGRAM(s) Oral daily    MEDICATIONS  (PRN):  acetaminophen   Tablet .. 650 milliGRAM(s) Oral every 6 hours PRN Mild Pain (1 - 3), Moderate Pain (4 - 6)  ALPRAZolam 0.25 milliGRAM(s) Oral three times a day PRN anxiety  dextrose 40% Gel 15 Gram(s) Oral once PRN Blood Glucose LESS THAN 70 milliGRAM(s)/deciliter  glucagon  Injectable 1 milliGRAM(s) IntraMuscular once PRN Glucose LESS THAN 70 milligrams/deciliter  traMADol 25 milliGRAM(s) Oral every 6 hours PRN Moderate Pain (4 - 6)    Pertinent Labs: 08-15 @ 06:26: Na 139, BUN 68<H>, Cr 2.00<H>, <H>, K+ 4.1, Phos --, Mg 2.2, Alk Phos --, ALT/SGPT --, AST/SGOT --, HbA1c --    Finger Sticks:  POCT Blood Glucose.: 257 mg/dL (08-15 @ 07:38)  POCT Blood Glucose.: 376 mg/dL ( @ 21:11)  POCT Blood Glucose.: 364 mg/dL ( @ 16:37)  POCT Blood Glucose.: 317 mg/dL ( @ 11:36)      Skin per nursing documentation: no pressure injuries noted at this time  Edema: none noted    Estimated Needs:   [x] no change since previous assessment  [ ] recalculated:     Previous Nutrition Diagnosis: Predicted suboptimal energy intake  Nutrition Diagnosis is: ongoing, now escalated to malnutrition.    New Nutrition Diagnosis: Malnutrition, moderate (acute on chronic)  Related to: inadequate po intake at times in setting of extended hospitalization and end stage cardiomyopathy noted  As evidenced by: po intake meeting 20-85% x ~10 days with variable po intake noted since earlier in admission as well, ~4.4 kG overall wt loss over the course of pt hospitalization x 1 month, muscle wasting, debility    Interventions: Pt medical condition precludes aggressive nutrition interventions at this time. Oral nutrition supplements being provided and po intake does not seem to be declining further, but rather stabilizing around what is likely pt new baseline po intake. Also being addressed with renal multivitamin at this time.    Recommend  1) Continue to recommend liberalizing diet to Consistent Carbohydrate (with evening snacks) + Low Sodium + Halal. Fluids deferred to team. Promote adequate BG control, recommend adjust insulin as needed.  2) Recommend discontinue glucerna supplement as pt states she is not drinking it. Continue to provide [employer] Health Shake 1x/daily.  3) Continue current renal multivitamin as indicated.  4) Provide nutrition education to pt family members/care givers as feasible for promoting adequate BG control and promoting adequate protein-energy intake.    Monitoring and Evaluation:     Continue to monitor Nutritional intake, Tolerance to diet prescription, weights, labs, skin integrity    RD remains available upon request and will follow up per protocol. Janneth Wang RD, CDN Pager: [phone].

## 2019-08-15 NOTE — PROGRESS NOTE ADULT - SUBJECTIVE AND OBJECTIVE BOX
Endocrinology Attending Covering for Dr. Banks      Chief complaint  Patient is a 73y old  Female who presents with a chief complaint of Hypoxia, SOB (15 Aug 2019 09:02)   Review of systems  Patient in bed, looks comfortable, no fever,  had no hypoglycemia.  complaining of weakness    Labs and Fingersticks  CAPILLARY BLOOD GLUCOSE      POCT Blood Glucose.: 257 mg/dL (15 Aug 2019 07:38)  POCT Blood Glucose.: 376 mg/dL (14 Aug 2019 21:11)  POCT Blood Glucose.: 364 mg/dL (14 Aug 2019 16:37)  POCT Blood Glucose.: 317 mg/dL (14 Aug 2019 11:36)      Anion Gap, Serum: 16 (08-15 @ 06:26)  Anion Gap, Serum: 17 (08-14 @ 06:23)      Calcium, Total Serum: 9.8 (08-15 @ 06:26)  Calcium, Total Serum: 9.7 (08-14 @ 06:23)          08-15    139  |  102  |  68<H>  ----------------------------<  213<H>  4.1   |  21<L>  |  2.00<H>    Ca    9.8      15 Aug 2019 06:26  Phos  3.3     08-14  Mg     2.2     08-15                          9.5    9.59  )-----------( 348      ( 14 Aug 2019 08:41 )             30.4     Medications  MEDICATIONS  (STANDING):  acetaminophen  IVPB .. 1000 milliGRAM(s) IV Intermittent once  aspirin enteric coated 81 milliGRAM(s) Oral daily  atorvastatin 40 milliGRAM(s) Oral at bedtime  buMETAnide 3 milliGRAM(s) Oral every 12 hours  chlorhexidine 2% Cloths 1 Application(s) Topical daily  clopidogrel Tablet 75 milliGRAM(s) Oral daily  dextrose 5%. 1000 milliLiter(s) (50 mL/Hr) IV Continuous <Continuous>  dextrose 50% Injectable 12.5 Gram(s) IV Push once  dextrose 50% Injectable 25 Gram(s) IV Push once  dextrose 50% Injectable 25 Gram(s) IV Push once  docusate sodium 100 milliGRAM(s) Oral two times a day  heparin  Injectable 5000 Unit(s) SubCutaneous every 12 hours  hydrALAZINE 50 milliGRAM(s) Oral every 8 hours  insulin glargine Injectable (LANTUS) 88 Unit(s) SubCutaneous at bedtime  insulin lispro (HumaLOG) corrective regimen sliding scale   SubCutaneous three times a day before meals  insulin lispro (HumaLOG) corrective regimen sliding scale   SubCutaneous at bedtime  insulin lispro Injectable (HumaLOG) 46 Unit(s) SubCutaneous before dinner  insulin lispro Injectable (HumaLOG) 42 Unit(s) SubCutaneous before lunch  insulin lispro Injectable (HumaLOG) 42 Unit(s) SubCutaneous before breakfast  isosorbide   dinitrate Tablet (ISORDIL) 30 milliGRAM(s) Oral three times a day  levothyroxine 50 MICROGram(s) Oral daily  lidocaine   Patch 1 Patch Transdermal daily  melatonin 3 milliGRAM(s) Oral at bedtime  metoprolol succinate ER 25 milliGRAM(s) Oral daily  montelukast 10 milliGRAM(s) Oral daily  Nephro-enrico 1 Tablet(s) Oral daily  pantoprazole    Tablet 40 milliGRAM(s) Oral before breakfast  polyethylene glycol 3350 17 Gram(s) Oral daily  senna 2 Tablet(s) Oral at bedtime  spironolactone 25 milliGRAM(s) Oral daily      Physical Exam  General: Patient comfortable in bed  Vital Signs Last 12 Hrs  T(F): 98.1 (08-15-19 @ 01:44), Max: 98.1 (08-15-19 @ 01:44)  HR: 92 (08-15-19 @ 06:19) (82 - 92)  BP: 124/68 (08-15-19 @ 06:19) (103/60 - 131/71)  BP(mean): --  RR: 17 (08-15-19 @ 01:44) (17 - 17)  SpO2: 96% (08-15-19 @ 01:44) (96% - 96%)  Neck: No palpable thyroid nodules.  CVS: S1S2, No murmurs  Respiratory: No wheezing, no crepitations  GI: Abdomen soft, bowel sounds positive  Musculoskeletal:  edema lower extremities.   Skin: No skin rashes, no ecchymosis    Diagnostics

## 2019-08-15 NOTE — PROGRESS NOTE ADULT - ASSESSMENT
Pt is a 74 yo woman with PMHx of HTN, T2DM, CAD s/p CABG (LIMA to LAD, SVG to OM, SVG to PDA 2014 at [facility]), non-dilated ICM (EF 20-25%), severe mitral regurgitation s/p mitral clip (6/13/19 Dr. Newman), severe pulm HTN, CKD, hypothyroidism admitted with worsening SOB.    A/P:  MERVIN  Likely sec to cardiogenic shock  Renal function stable at 2 today  Pt currently on Bumex 3 mg PO Q12 and remains on oral Spironolactone  Continue diuretic therapy per cardiology.  Optimize glucose control  Monitor renal function   Avoid further nephrotoxics, NSAIDS, RCA    CKD stage 4:  baseline Scr 1.8-2.0. Scr stable at 2 today.   Renal function fluctuates sec to CHF  Monitor renal function at present    SOB:  in setting of HF. Improving on diuretic therapy.   Continue diuretics per cardiology    Acidosis   Sec to Renal failure  Improved and is currently at goal   Monitor serum Co2 at present    Hypokalemia:  in the setting of diuretic therapy.   Serum potassium stable today     Anemia:   Hb stable  Pt with iron deficiency anemia.

## 2019-08-15 NOTE — PROGRESS NOTE ADULT - ASSESSMENT
Assessment  DMT2: 73y Female with DM T2 with hyperglycemia on insulin, blood sugars still hig   257  this AM,   did body exam again, could not identify any source of infection that could explain her insulin resistance.  CAD: S/P cabg On medications, stable, monitored.  Hypothyroidism:  On synthroid 50  mcg po daily, asymptomatic.  HTN: Controlled, On med.  HLD; on statin  CKD: Monitor labs/BMP,         Plan:   DMT2:  The regiment just changed ,let see how it will work     increase LAntus to 52 units in AM, and 60  units in PM     Humalog to 44  units before breakfast and Lunch and 50 units pre-dinner    Fs in renal patients may show higher number than blood test,   will check also anti insulin AB, as a factor that bind the insulin and not realizing it, but occasionaly  may do that, suggest to postpone the discharge till tomorrow  in addition to correction scale coverage, monitor FS will FU  Patient counseled for compliance with consistent low carb diet.    CAD: S/P cabg On medications, stable, monitored.  Hypothyroidism: tsh 2.0  On synthroid 50  mcg po daily, asymptomatic. F/U tsh as outpatient  HTN: Continue treatment, monitoring, Primary team FU  HLD; on statin  CKD: Continue monitoring labs, renal FU  Case discussed with the NP  erika Woodard MD  [phone]

## 2019-08-16 LAB
ANION GAP SERPL CALC-SCNC: 17 MMOL/L — SIGNIFICANT CHANGE UP (ref 5–17)
BUN SERPL-MCNC: 69 MG/DL — HIGH (ref 7–23)
CALCIUM SERPL-MCNC: 9.9 MG/DL — SIGNIFICANT CHANGE UP (ref 8.4–10.5)
CHLORIDE SERPL-SCNC: 101 MMOL/L — SIGNIFICANT CHANGE UP (ref 96–108)
CO2 SERPL-SCNC: 20 MMOL/L — LOW (ref 22–31)
CREAT SERPL-MCNC: 1.9 MG/DL — HIGH (ref 0.5–1.3)
FRUCTOSAMINE SERPL-MCNC: 350 UMOL/L — HIGH (ref 205–285)
GLUCOSE BLDC GLUCOMTR-MCNC: 233 MG/DL — HIGH (ref 70–99)
GLUCOSE BLDC GLUCOMTR-MCNC: 255 MG/DL — HIGH (ref 70–99)
GLUCOSE BLDC GLUCOMTR-MCNC: 259 MG/DL — HIGH (ref 70–99)
GLUCOSE BLDC GLUCOMTR-MCNC: 290 MG/DL — HIGH (ref 70–99)
GLUCOSE BLDC GLUCOMTR-MCNC: 293 MG/DL — HIGH (ref 70–99)
GLUCOSE BLDC GLUCOMTR-MCNC: 304 MG/DL — HIGH (ref 70–99)
GLUCOSE SERPL-MCNC: 212 MG/DL — HIGH (ref 70–99)
POTASSIUM SERPL-MCNC: 3.9 MMOL/L — SIGNIFICANT CHANGE UP (ref 3.5–5.3)
POTASSIUM SERPL-SCNC: 3.9 MMOL/L — SIGNIFICANT CHANGE UP (ref 3.5–5.3)
SODIUM SERPL-SCNC: 138 MMOL/L — SIGNIFICANT CHANGE UP (ref 135–145)

## 2019-08-16 RX ORDER — HYDRALAZINE HCL 50 MG
50 TABLET ORAL EVERY 8 HOURS
Refills: 0 | Status: DISCONTINUED | OUTPATIENT
Start: 2019-08-16 | End: 2019-09-05

## 2019-08-16 RX ORDER — TRAMADOL HYDROCHLORIDE 50 MG/1
25 TABLET ORAL EVERY 6 HOURS
Refills: 0 | Status: DISCONTINUED | OUTPATIENT
Start: 2019-08-18 | End: 2019-08-25

## 2019-08-16 RX ADMIN — BUMETANIDE 3 MILLIGRAM(S): 0.25 INJECTION INTRAMUSCULAR; INTRAVENOUS at 05:15

## 2019-08-16 RX ADMIN — HEPARIN SODIUM 5000 UNIT(S): 5000 INJECTION INTRAVENOUS; SUBCUTANEOUS at 17:15

## 2019-08-16 RX ADMIN — Medication 44 UNIT(S): at 08:11

## 2019-08-16 RX ADMIN — Medication 1: at 21:44

## 2019-08-16 RX ADMIN — Medication 0.25 MILLIGRAM(S): at 09:06

## 2019-08-16 RX ADMIN — CLOPIDOGREL BISULFATE 75 MILLIGRAM(S): 75 TABLET, FILM COATED ORAL at 12:21

## 2019-08-16 RX ADMIN — Medication 1 TABLET(S): at 12:21

## 2019-08-16 RX ADMIN — CHLORHEXIDINE GLUCONATE 1 APPLICATION(S): 213 SOLUTION TOPICAL at 21:43

## 2019-08-16 RX ADMIN — HEPARIN SODIUM 5000 UNIT(S): 5000 INJECTION INTRAVENOUS; SUBCUTANEOUS at 05:16

## 2019-08-16 RX ADMIN — ATORVASTATIN CALCIUM 40 MILLIGRAM(S): 80 TABLET, FILM COATED ORAL at 21:42

## 2019-08-16 RX ADMIN — INSULIN GLARGINE 52 UNIT(S): 100 INJECTION, SOLUTION SUBCUTANEOUS at 09:44

## 2019-08-16 RX ADMIN — BUMETANIDE 3 MILLIGRAM(S): 0.25 INJECTION INTRAMUSCULAR; INTRAVENOUS at 17:16

## 2019-08-16 RX ADMIN — Medication 50 MICROGRAM(S): at 05:16

## 2019-08-16 RX ADMIN — LIDOCAINE 1 PATCH: 4 CREAM TOPICAL at 09:08

## 2019-08-16 RX ADMIN — Medication 3: at 12:20

## 2019-08-16 RX ADMIN — ISOSORBIDE DINITRATE 30 MILLIGRAM(S): 5 TABLET ORAL at 21:42

## 2019-08-16 RX ADMIN — INSULIN GLARGINE 60 UNIT(S): 100 INJECTION, SOLUTION SUBCUTANEOUS at 21:47

## 2019-08-16 RX ADMIN — Medication 50 MILLIGRAM(S): at 05:16

## 2019-08-16 RX ADMIN — ISOSORBIDE DINITRATE 30 MILLIGRAM(S): 5 TABLET ORAL at 05:16

## 2019-08-16 RX ADMIN — Medication 81 MILLIGRAM(S): at 12:21

## 2019-08-16 RX ADMIN — Medication 0.25 MILLIGRAM(S): at 23:47

## 2019-08-16 RX ADMIN — LIDOCAINE 1 PATCH: 4 CREAM TOPICAL at 21:43

## 2019-08-16 RX ADMIN — Medication 44 UNIT(S): at 12:20

## 2019-08-16 RX ADMIN — ISOSORBIDE DINITRATE 30 MILLIGRAM(S): 5 TABLET ORAL at 14:01

## 2019-08-16 RX ADMIN — Medication 2: at 17:15

## 2019-08-16 RX ADMIN — SPIRONOLACTONE 25 MILLIGRAM(S): 25 TABLET, FILM COATED ORAL at 05:16

## 2019-08-16 RX ADMIN — Medication 100 MILLIGRAM(S): at 05:15

## 2019-08-16 RX ADMIN — LIDOCAINE 1 PATCH: 4 CREAM TOPICAL at 08:13

## 2019-08-16 RX ADMIN — Medication 650 MILLIGRAM(S): at 09:06

## 2019-08-16 RX ADMIN — MONTELUKAST 10 MILLIGRAM(S): 4 TABLET, CHEWABLE ORAL at 12:21

## 2019-08-16 RX ADMIN — Medication 50 UNIT(S): at 17:14

## 2019-08-16 RX ADMIN — PANTOPRAZOLE SODIUM 40 MILLIGRAM(S): 20 TABLET, DELAYED RELEASE ORAL at 05:16

## 2019-08-16 RX ADMIN — Medication 25 MILLIGRAM(S): at 05:16

## 2019-08-16 RX ADMIN — Medication 3 MILLIGRAM(S): at 21:42

## 2019-08-16 RX ADMIN — Medication 650 MILLIGRAM(S): at 09:36

## 2019-08-16 RX ADMIN — Medication 50 MILLIGRAM(S): at 21:42

## 2019-08-16 RX ADMIN — Medication 3: at 08:12

## 2019-08-16 NOTE — PROGRESS NOTE ADULT - ASSESSMENT
Assessment  DMT2: 73y Female with DM T2 with hyperglycemia on insulin, blood sugars still hig   Blood sugarers  in the 250 t0 300 range  I suspect possibility of Anti- insulin AB that binds the insulin and not releasing it  ( but may suddenly release occasionally  There is  about 46 mg./Dl glucose difference between  FS and the blood  ( Known in RF patients)  CAD: S/P cabg On medications, stable, monitored.  Hypothyroidism:  On synthroid 50  mcg po daily, asymptomatic.  HTN: Controlled, On med.  HLD; on statin  CKD: Monitor labs/BMP,         Plan:   DMT2:  The regiment just changed ,let see how it will work     LAntus to 60  units in AM, and 66 units in PM     Humalog to 46  units before breakfast and Lunch and 52  units pre-dinner   in addition to correction scale coverage, monitor FS will FU  Patient counseled for compliance with consistent low carb diet.  Please Order : Anti Insulin AB  Fructosamin  CAD: S/P cabg On medications, stable, monitored.  Hypothyroidism: tsh 2.0  On synthroid 50  mcg po daily, asymptomatic. F/U tsh as outpatient  HTN: Continue treatment, monitoring, Primary team FU  HLD; on statin  CKD: Continue monitoring labs, renal FU  Case discussed with the NP  erika Woodard MD  [phone]

## 2019-08-16 NOTE — PROVIDER CONTACT NOTE (OTHER) - DATE AND TIME:
16-Aug-2019 12:10 Bill For Surgical Tray: no Expected Date Of Service: 11/13/2018 Billing Type: Third-Party Bill

## 2019-08-16 NOTE — CHART NOTE - NSCHARTNOTEFT_GEN_A_CORE
Pt seen for malnutrition follow up on 6TOW.    Chart reviewed, events noted.     Diet order unchanged from yesterday.    Pt has no GI complaints this AM, states she feels weak. Last BMs yesterday 8/15. Observed with breakfast tray set up in front of her and pt states she feels she has a good appetite this AM.     Recommendations:  1) Continue to recommend liberalizing diet to Consistent Carbohydrate (with evening snacks) + Low Sodium + Halal. Fluids deferred to team. Promote adequate BG control, recommend adjust insulin as needed.  2) Recommend discontinue glucerna supplement as pt states she is not drinking it. Continue to provide [employer] Shake 1x/daily.  3) Continue current renal multivitamin as indicated.  4) Provide nutrition education to pt family members/care givers as feasible for promoting adequate BG control and promoting adequate protein-energy intake.    RD remains available. Janneth Wang RD, CDN Pager: [phone].

## 2019-08-16 NOTE — PROGRESS NOTE ADULT - ASSESSMENT
Pt is a 74 yo woman with PMHx of HTN, T2DM, CAD s/p CABG (LIMA to LAD, SVG to OM, SVG to PDA 2014 at [facility]), non-dilated ICM (EF 20-25%), severe mitral regurgitation s/p mitral clip (6/13/19 Dr. Newman), severe pulm HTN, CKD, hypothyroidism admitted with worsening SOB.    A/P:  MERVIN  Likely sec to cardiogenic shock  Renal function stable at 1.9 today  Pt currently on Bumex 3 mg PO Q12 and remains on oral Spironolactone. Continue diuretic therapy per cardiology.  Optimize glucose control  Monitor renal function   Avoid further nephrotoxics, NSAIDS, RCA    CKD stage 4:  baseline Scr 1.8-2.0. Scr stable at 1.9 today.   Renal function fluctuates sec to CHF  Monitor renal function at present    SOB:  in setting of HF. Improved on diuretic therapy.   Continue diuretics per cardiology    Acidosis   Sec to Renal failure  Improved and is currently at goal   Monitor serum Co2 at present    Hypokalemia:  in the setting of diuretic therapy.   Serum potassium stable today     Anemia:   Hb stable  Pt with iron deficiency anemia.

## 2019-08-16 NOTE — PROGRESS NOTE ADULT - SUBJECTIVE AND OBJECTIVE BOX
CHIEF COMPLAINT:  patient laying in the bed awake and verbal still complain of fatigue no S will be noted she was seen by Endo for her persistent hyperglycemia recommended to monitor her in the hospital before discharge home for better glycemic control discussed with the Endo and medical team in detail  SUBJECTIVE:     REVIEW OF SYSTEMS:    CONSTITUTIONAL: (x  )  weakness,  (  ) fevers or chills  EYES/ENT: (  )visual changes;     NECK: (  ) pain or stiffness  RESPIRATORY:   (  )cough, wheezing, hemoptysis;  (  ) shortness of breath  CARDIOVASCULAR:  (  )chest pain or palpitations  GASTROINTESTINAL:   (  )abdominal or epigastric pain.  (  ) nausea, vomiting, or hematemesis;   (   ) diarrhea or constipation.   GENITOURINARY:   (    ) dysuria, frequency or hematuria  NEUROLOGICAL:  (   ) numbness or weakness   All other review of systems is negative unless indicated above    Vital Signs Last 24 Hrs  T(C): 36.5 (16 Aug 2019 11:40), Max: 37.2 (16 Aug 2019 04:15)  T(F): 97.7 (16 Aug 2019 11:40), Max: 99 (16 Aug 2019 04:15)  HR: 76 (16 Aug 2019 13:56) (76 - 91)  BP: 116/60 (16 Aug 2019 13:56) (92/57 - 119/67)  BP(mean): --  RR: 18 (16 Aug 2019 13:56) (18 - 18)  SpO2: 99% (16 Aug 2019 13:56) (97% - 100%)    I&O's Summary    15 Aug 2019 07:01  -  16 Aug 2019 07:00  --------------------------------------------------------  IN: 660 mL / OUT: 975 mL / NET: -315 mL    16 Aug 2019 07:01  -  16 Aug 2019 16:50  --------------------------------------------------------  IN: 585 mL / OUT: 775 mL / NET: -190 mL        CAPILLARY BLOOD GLUCOSE      POCT Blood Glucose.: 233 mg/dL (16 Aug 2019 16:45)  POCT Blood Glucose.: 255 mg/dL (16 Aug 2019 11:39)  POCT Blood Glucose.: 304 mg/dL (16 Aug 2019 10:32)  POCT Blood Glucose.: 290 mg/dL (16 Aug 2019 09:42)  POCT Blood Glucose.: 259 mg/dL (16 Aug 2019 07:57)  POCT Blood Glucose.: 236 mg/dL (15 Aug 2019 21:04)      PHYSICAL EXAM:  Constitutional:  (  x ) NAD,   (   )awake and alert  HEENT: PERR, EOMI,    Neck: Soft and supple, No LAD, No JVD  Respiratory:  (   x Breath sounds are clear bilaterally,    (   ) wheezing, rales or rhonchi  Cardiovascular:     ( x  )S1 and S2, regular rate    Gastrointestinal:  (   )Bowel Sounds present, soft,   (  )nontender, nondistended,    Extremities:    (  ) peripheral edema  Vascular: 2+ peripheral pulses  Neurological:    (   x )A/O x 3,   (  ) focal deficits  Musculoskeletal:    ( x  )  normal strength b/l upper  (     ) normal  lower extremities  Skin: No rashes         MEDICATIONS:  MEDICATIONS  (STANDING):  acetaminophen  IVPB .. 1000 milliGRAM(s) IV Intermittent once  aspirin enteric coated 81 milliGRAM(s) Oral daily  atorvastatin 40 milliGRAM(s) Oral at bedtime  buMETAnide 3 milliGRAM(s) Oral every 12 hours  chlorhexidine 2% Cloths 1 Application(s) Topical daily  clopidogrel Tablet 75 milliGRAM(s) Oral daily  dextrose 5%. 1000 milliLiter(s) (50 mL/Hr) IV Continuous <Continuous>  dextrose 50% Injectable 12.5 Gram(s) IV Push once  dextrose 50% Injectable 25 Gram(s) IV Push once  dextrose 50% Injectable 25 Gram(s) IV Push once  docusate sodium 100 milliGRAM(s) Oral two times a day  heparin  Injectable 5000 Unit(s) SubCutaneous every 12 hours  hydrALAZINE 50 milliGRAM(s) Oral every 8 hours  insulin glargine Injectable (LANTUS) 60 Unit(s) SubCutaneous at bedtime  insulin glargine Injectable (LANTUS) 52 Unit(s) SubCutaneous every morning  insulin lispro (HumaLOG) corrective regimen sliding scale   SubCutaneous three times a day before meals  insulin lispro (HumaLOG) corrective regimen sliding scale   SubCutaneous at bedtime  insulin lispro Injectable (HumaLOG) 44 Unit(s) SubCutaneous before breakfast  insulin lispro Injectable (HumaLOG) 44 Unit(s) SubCutaneous before lunch  insulin lispro Injectable (HumaLOG) 50 Unit(s) SubCutaneous before dinner  isosorbide   dinitrate Tablet (ISORDIL) 30 milliGRAM(s) Oral three times a day  levothyroxine 50 MICROGram(s) Oral daily  lidocaine   Patch 1 Patch Transdermal daily  melatonin 3 milliGRAM(s) Oral at bedtime  metoprolol succinate ER 25 milliGRAM(s) Oral daily  montelukast 10 milliGRAM(s) Oral daily  Nephro-enrico 1 Tablet(s) Oral daily  pantoprazole    Tablet 40 milliGRAM(s) Oral before breakfast  polyethylene glycol 3350 17 Gram(s) Oral daily  senna 2 Tablet(s) Oral at bedtime  spironolactone 25 milliGRAM(s) Oral daily      LABS: All Labs Reviewed:    08-16    138  |  101  |  69<H>  ----------------------------<  212<H>  3.9   |  20<L>  |  1.90<H>    Ca    9.9      16 Aug 2019 06:08  Mg     2.2     08-15            Blood Culture:   Urine Culture      RADIOLOGY/EKG:    ASSESSMENT AND PLAN:  72 yo woman with PMHx of HTN, T2DM (A1c 7.4%), CAD s/p CABG (LIMA to LAD, SVG to OM, SVG to PDA 2014 at [facility]), non-dilated ICM (EF 20-25%), severe mitral regurgitation s/p mitral clip (6/13/19 Dr. Newman), severe pulm HTN, CKD, hypothyroidism, who is presenting to ED after experiencing worsening SOB and intermittent chest pain for 1 week at [facility]. Of note, pt was recently discharged on 6/19 after having mitral clip procedure completed. She initially required BiPAP with improvement in her respiratory status following diuresis. Initiated on vasopressors and inotropes in CCU, which were subsequently weaned off. Infectious work up was negative.    #HFrEF likely 2/2 ICM with MR s/p alonso clip  -  continue with Bumex 3 mg twice daily   discuss with cardiology      -    - continue hydralazine 50 mg TID and isordil 30 TID  - continue spironolactone 25 mg  - continue Metoprolol Succinate 25 mg PO Daily   - No further cardiac testing at this time.      #CAD  - continue ASA and statin   - Pt with chest pain, but also with end stage cardiomyopathy, coronary disease, had MitraClip and no further intervention feasible. She should not have trops checked.    #SVT - likely Atrial Tach vs. Atrial Flutter   - Amio initially not given due to elevated LFTs likely in the setting of congestion.   - recheck LFTs.   - Monitor on tele.   - continue metoprolol at current dose as above  - If pt has SVT again can consider amio   #diabetes on  62 units Lantus at bedtime and 50 unit  Lantus in the morning  continue regular insulin 3 times a day at night she is receiving 46 units secondary to elevated hyperglycemia at bedtime  we monitor her lab tests in a.m.  DVT PPX:    ADVANCED DIRECTIVE:    DISPOSITION:

## 2019-08-16 NOTE — PROGRESS NOTE ADULT - SUBJECTIVE AND OBJECTIVE BOX
[facility] [place] [facility]   MD GONZALEZ SHAH D.O, PA    TELEPHONE NUMBERS:  OFFICE: [phone]  DR. SANTOYO CELL: [phone]  JUSTEN FIELD CELL: [phone]  DR. RIDER CELL:  [phone]    RENAL FOLLOW UP NOTE  --------------------------------------------------------------------------------  HPI: Pt seen and examined at bedside. Admits to weakness   Denies SOB, chest pain     PAST HISTORY  --------------------------------------------------------------------------------  No significant changes to PMH, PSH, FHx, SHx, unless otherwise noted    ALLERGIES & MEDICATIONS  --------------------------------------------------------------------------------  Allergies    azithromycin (Hives; Pruritus)    Intolerances      Standing Inpatient Medications  acetaminophen  IVPB .. 1000 milliGRAM(s) IV Intermittent once  aspirin enteric coated 81 milliGRAM(s) Oral daily  atorvastatin 40 milliGRAM(s) Oral at bedtime  buMETAnide 3 milliGRAM(s) Oral every 12 hours  chlorhexidine 2% Cloths 1 Application(s) Topical daily  clopidogrel Tablet 75 milliGRAM(s) Oral daily  dextrose 5%. 1000 milliLiter(s) IV Continuous <Continuous>  dextrose 50% Injectable 12.5 Gram(s) IV Push once  dextrose 50% Injectable 25 Gram(s) IV Push once  dextrose 50% Injectable 25 Gram(s) IV Push once  docusate sodium 100 milliGRAM(s) Oral two times a day  heparin  Injectable 5000 Unit(s) SubCutaneous every 12 hours  hydrALAZINE 50 milliGRAM(s) Oral every 8 hours  insulin glargine Injectable (LANTUS) 60 Unit(s) SubCutaneous at bedtime  insulin glargine Injectable (LANTUS) 52 Unit(s) SubCutaneous every morning  insulin lispro (HumaLOG) corrective regimen sliding scale   SubCutaneous three times a day before meals  insulin lispro (HumaLOG) corrective regimen sliding scale   SubCutaneous at bedtime  insulin lispro Injectable (HumaLOG) 44 Unit(s) SubCutaneous before breakfast  insulin lispro Injectable (HumaLOG) 44 Unit(s) SubCutaneous before lunch  insulin lispro Injectable (HumaLOG) 50 Unit(s) SubCutaneous before dinner  isosorbide   dinitrate Tablet (ISORDIL) 30 milliGRAM(s) Oral three times a day  levothyroxine 50 MICROGram(s) Oral daily  lidocaine   Patch 1 Patch Transdermal daily  melatonin 3 milliGRAM(s) Oral at bedtime  metoprolol succinate ER 25 milliGRAM(s) Oral daily  montelukast 10 milliGRAM(s) Oral daily  Nephro-enrico 1 Tablet(s) Oral daily  pantoprazole    Tablet 40 milliGRAM(s) Oral before breakfast  polyethylene glycol 3350 17 Gram(s) Oral daily  senna 2 Tablet(s) Oral at bedtime  spironolactone 25 milliGRAM(s) Oral daily    PRN Inpatient Medications  acetaminophen   Tablet .. 650 milliGRAM(s) Oral every 6 hours PRN  ALPRAZolam 0.25 milliGRAM(s) Oral three times a day PRN  dextrose 40% Gel 15 Gram(s) Oral once PRN  glucagon  Injectable 1 milliGRAM(s) IntraMuscular once PRN  traMADol 25 milliGRAM(s) Oral every 6 hours PRN      REVIEW OF SYSTEMS  --------------------------------------------------------------------------------  General: no fever  CVS: no chest pain  RESP: no sob, no cough  ABD: no abdominal pain  : no dysuria,  MSK: no edema     VITALS/PHYSICAL EXAM  --------------------------------------------------------------------------------  T(C): 37.2 (08-16-19 @ 04:15), Max: 37.2 (08-16-19 @ 04:15)  HR: 90 (08-16-19 @ 04:15) (80 - 91)  BP: 115/70 (08-16-19 @ 04:15) (111/64 - 119/67)  RR: 18 (08-16-19 @ 04:15) (18 - 18)  SpO2: 100% (08-16-19 @ 04:15) (97% - 100%)  Wt(kg): --        08-15-19 @ 07:01  -  08-16-19 @ 07:00  --------------------------------------------------------  IN: 660 mL / OUT: 975 mL / NET: -315 mL    08-16-19 @ 07:01  -  08-16-19 @ 11:02  --------------------------------------------------------  IN: 300 mL / OUT: 375 mL / NET: -75 mL      Physical Exam:  	Gen: NAD  	HEENT: MONICA  	Pulm: Course BS B/L  	CV: S1S2  	Abd: Soft, +BS  	Ext: No LE edema B/L                      Neuro: Awake   	Skin: Warm and Dry     LABS/STUDIES  --------------------------------------------------------------------------------    138  |  101  |  69  ----------------------------<  212      [08-16-19 @ 06:08]  3.9   |  20  |  1.90        Ca     9.9     [08-16-19 @ 06:08]      Mg     2.2     [08-15-19 @ 06:26]    Creatinine Trend:  SCr 1.90 [08-16 @ 06:08]  SCr 2.00 [08-15 @ 06:26]  SCr 1.91 [08-14 @ 06:23]  SCr 2.18 [08-13 @ 06:18]  SCr 1.86 [08-12 @ 06:13]      Iron 42, TIBC 348, %sat 12      [07-05-19 @ 22:32]  Ferritin 130      [07-05-19 @ 22:32]  .4 (Ca --)      [04-25-19 @ 05:45]   --  PTH 43.55 (Ca --)      [09-10-18 @ 07:15]   --  PTH 36.60 (Ca --)      [09-08-18 @ 03:15]   --  Vitamin D (25OH) 25.9      [05-01-19 @ 04:00]  HbA1c 7.4      [07-05-19 @ 22:32]  TSH 2.00      [07-05-19 @ 22:32]  Lipid: chol 148, , HDL 35,       [06-01-19 @ 08:00]

## 2019-08-16 NOTE — PROGRESS NOTE ADULT - SUBJECTIVE AND OBJECTIVE BOX
Endocrinology Attending Covering for Dr. Banks      Chief complaint  Patient is a 73y old  Female who presents with a chief complaint of Hypoxia, SOB (15 Aug 2019 09:27)   Review of systems  Patient in bed, looks comfortable, no fever,  had no hypoglycemia.    Labs and Fingersticks  CAPILLARY BLOOD GLUCOSE      POCT Blood Glucose.: 290 mg/dL (16 Aug 2019 09:42)  POCT Blood Glucose.: 259 mg/dL (16 Aug 2019 07:57)  POCT Blood Glucose.: 236 mg/dL (15 Aug 2019 21:04)  POCT Blood Glucose.: 257 mg/dL (15 Aug 2019 16:34)  POCT Blood Glucose.: 302 mg/dL (15 Aug 2019 11:33)      Anion Gap, Serum: 17 (08-16 @ 06:08)  Anion Gap, Serum: 16 (08-15 @ 06:26)      Calcium, Total Serum: 9.9 (08-16 @ 06:08)  Calcium, Total Serum: 9.8 (08-15 @ 06:26)          08-16    138  |  101  |  69<H>  ----------------------------<  212<H>  3.9   |  20<L>  |  1.90<H>    Ca    9.9      16 Aug 2019 06:08  Mg     2.2     08-15      Medications  MEDICATIONS  (STANDING):  acetaminophen  IVPB .. 1000 milliGRAM(s) IV Intermittent once  aspirin enteric coated 81 milliGRAM(s) Oral daily  atorvastatin 40 milliGRAM(s) Oral at bedtime  buMETAnide 3 milliGRAM(s) Oral every 12 hours  chlorhexidine 2% Cloths 1 Application(s) Topical daily  clopidogrel Tablet 75 milliGRAM(s) Oral daily  dextrose 5%. 1000 milliLiter(s) (50 mL/Hr) IV Continuous <Continuous>  dextrose 50% Injectable 12.5 Gram(s) IV Push once  dextrose 50% Injectable 25 Gram(s) IV Push once  dextrose 50% Injectable 25 Gram(s) IV Push once  docusate sodium 100 milliGRAM(s) Oral two times a day  heparin  Injectable 5000 Unit(s) SubCutaneous every 12 hours  hydrALAZINE 50 milliGRAM(s) Oral every 8 hours  insulin glargine Injectable (LANTUS) 60 Unit(s) SubCutaneous at bedtime  insulin glargine Injectable (LANTUS) 52 Unit(s) SubCutaneous every morning  insulin lispro (HumaLOG) corrective regimen sliding scale   SubCutaneous three times a day before meals  insulin lispro (HumaLOG) corrective regimen sliding scale   SubCutaneous at bedtime  insulin lispro Injectable (HumaLOG) 44 Unit(s) SubCutaneous before breakfast  insulin lispro Injectable (HumaLOG) 44 Unit(s) SubCutaneous before lunch  insulin lispro Injectable (HumaLOG) 50 Unit(s) SubCutaneous before dinner  isosorbide   dinitrate Tablet (ISORDIL) 30 milliGRAM(s) Oral three times a day  levothyroxine 50 MICROGram(s) Oral daily  lidocaine   Patch 1 Patch Transdermal daily  melatonin 3 milliGRAM(s) Oral at bedtime  metoprolol succinate ER 25 milliGRAM(s) Oral daily  montelukast 10 milliGRAM(s) Oral daily  Nephro-enrico 1 Tablet(s) Oral daily  pantoprazole    Tablet 40 milliGRAM(s) Oral before breakfast  polyethylene glycol 3350 17 Gram(s) Oral daily  senna 2 Tablet(s) Oral at bedtime  spironolactone 25 milliGRAM(s) Oral daily      Physical Exam  General: Patient comfortable in bed  Vital Signs Last 12 Hrs  T(F): 99 (08-16-19 @ 04:15), Max: 99 (08-16-19 @ 04:15)  HR: 90 (08-16-19 @ 04:15) (90 - 90)  BP: 115/70 (08-16-19 @ 04:15) (115/70 - 115/70)  BP(mean): --  RR: 18 (08-16-19 @ 04:15) (18 - 18)  SpO2: 100% (08-16-19 @ 04:15) (100% - 100%)  Neck: No palpable thyroid nodules.  CVS: S1S2, No murmurs  Respiratory: No wheezing, no crepitations  GI: Abdomen soft, bowel sounds positive  Musculoskeletal:  edema lower extremities.   Skin: No skin rashes, no ecchymosis    Diagnostics

## 2019-08-17 LAB
ANION GAP SERPL CALC-SCNC: 16 MMOL/L — SIGNIFICANT CHANGE UP (ref 5–17)
BUN SERPL-MCNC: 73 MG/DL — HIGH (ref 7–23)
CALCIUM SERPL-MCNC: 9.6 MG/DL — SIGNIFICANT CHANGE UP (ref 8.4–10.5)
CHLORIDE SERPL-SCNC: 102 MMOL/L — SIGNIFICANT CHANGE UP (ref 96–108)
CO2 SERPL-SCNC: 19 MMOL/L — LOW (ref 22–31)
CREAT SERPL-MCNC: 1.86 MG/DL — HIGH (ref 0.5–1.3)
GLUCOSE BLDC GLUCOMTR-MCNC: 224 MG/DL — HIGH (ref 70–99)
GLUCOSE BLDC GLUCOMTR-MCNC: 244 MG/DL — HIGH (ref 70–99)
GLUCOSE BLDC GLUCOMTR-MCNC: 290 MG/DL — HIGH (ref 70–99)
GLUCOSE BLDC GLUCOMTR-MCNC: 391 MG/DL — HIGH (ref 70–99)
GLUCOSE SERPL-MCNC: 238 MG/DL — HIGH (ref 70–99)
POTASSIUM SERPL-MCNC: 3.6 MMOL/L — SIGNIFICANT CHANGE UP (ref 3.5–5.3)
POTASSIUM SERPL-SCNC: 3.6 MMOL/L — SIGNIFICANT CHANGE UP (ref 3.5–5.3)
SODIUM SERPL-SCNC: 137 MMOL/L — SIGNIFICANT CHANGE UP (ref 135–145)

## 2019-08-17 RX ORDER — INSULIN GLARGINE 100 [IU]/ML
66 INJECTION, SOLUTION SUBCUTANEOUS AT BEDTIME
Refills: 0 | Status: DISCONTINUED | OUTPATIENT
Start: 2019-08-17 | End: 2019-08-19

## 2019-08-17 RX ADMIN — Medication 44 UNIT(S): at 08:24

## 2019-08-17 RX ADMIN — Medication 3: at 21:40

## 2019-08-17 RX ADMIN — LIDOCAINE 1 PATCH: 4 CREAM TOPICAL at 09:10

## 2019-08-17 RX ADMIN — HEPARIN SODIUM 5000 UNIT(S): 5000 INJECTION INTRAVENOUS; SUBCUTANEOUS at 05:11

## 2019-08-17 RX ADMIN — SENNA PLUS 2 TABLET(S): 8.6 TABLET ORAL at 21:36

## 2019-08-17 RX ADMIN — Medication 650 MILLIGRAM(S): at 23:31

## 2019-08-17 RX ADMIN — BUMETANIDE 3 MILLIGRAM(S): 0.25 INJECTION INTRAMUSCULAR; INTRAVENOUS at 05:11

## 2019-08-17 RX ADMIN — Medication 50 MILLIGRAM(S): at 05:11

## 2019-08-17 RX ADMIN — ATORVASTATIN CALCIUM 40 MILLIGRAM(S): 80 TABLET, FILM COATED ORAL at 21:36

## 2019-08-17 RX ADMIN — MONTELUKAST 10 MILLIGRAM(S): 4 TABLET, CHEWABLE ORAL at 11:43

## 2019-08-17 RX ADMIN — ISOSORBIDE DINITRATE 30 MILLIGRAM(S): 5 TABLET ORAL at 21:35

## 2019-08-17 RX ADMIN — Medication 2: at 12:03

## 2019-08-17 RX ADMIN — ISOSORBIDE DINITRATE 30 MILLIGRAM(S): 5 TABLET ORAL at 05:11

## 2019-08-17 RX ADMIN — Medication 50 MICROGRAM(S): at 05:11

## 2019-08-17 RX ADMIN — Medication 81 MILLIGRAM(S): at 11:42

## 2019-08-17 RX ADMIN — CHLORHEXIDINE GLUCONATE 1 APPLICATION(S): 213 SOLUTION TOPICAL at 21:36

## 2019-08-17 RX ADMIN — Medication 50 MILLIGRAM(S): at 21:35

## 2019-08-17 RX ADMIN — Medication 0.25 MILLIGRAM(S): at 21:40

## 2019-08-17 RX ADMIN — Medication 50 UNIT(S): at 17:05

## 2019-08-17 RX ADMIN — HEPARIN SODIUM 5000 UNIT(S): 5000 INJECTION INTRAVENOUS; SUBCUTANEOUS at 17:05

## 2019-08-17 RX ADMIN — Medication 2: at 08:24

## 2019-08-17 RX ADMIN — LIDOCAINE 1 PATCH: 4 CREAM TOPICAL at 07:43

## 2019-08-17 RX ADMIN — Medication 3 MILLIGRAM(S): at 21:36

## 2019-08-17 RX ADMIN — INSULIN GLARGINE 52 UNIT(S): 100 INJECTION, SOLUTION SUBCUTANEOUS at 08:28

## 2019-08-17 RX ADMIN — LIDOCAINE 1 PATCH: 4 CREAM TOPICAL at 21:36

## 2019-08-17 RX ADMIN — Medication 650 MILLIGRAM(S): at 21:37

## 2019-08-17 RX ADMIN — Medication 25 MILLIGRAM(S): at 05:11

## 2019-08-17 RX ADMIN — POLYETHYLENE GLYCOL 3350 17 GRAM(S): 17 POWDER, FOR SOLUTION ORAL at 11:42

## 2019-08-17 RX ADMIN — SPIRONOLACTONE 25 MILLIGRAM(S): 25 TABLET, FILM COATED ORAL at 05:11

## 2019-08-17 RX ADMIN — Medication 3: at 17:05

## 2019-08-17 RX ADMIN — PANTOPRAZOLE SODIUM 40 MILLIGRAM(S): 20 TABLET, DELAYED RELEASE ORAL at 05:11

## 2019-08-17 RX ADMIN — Medication 100 MILLIGRAM(S): at 17:05

## 2019-08-17 RX ADMIN — CLOPIDOGREL BISULFATE 75 MILLIGRAM(S): 75 TABLET, FILM COATED ORAL at 11:42

## 2019-08-17 RX ADMIN — Medication 44 UNIT(S): at 12:04

## 2019-08-17 RX ADMIN — INSULIN GLARGINE 66 UNIT(S): 100 INJECTION, SOLUTION SUBCUTANEOUS at 21:36

## 2019-08-17 RX ADMIN — ISOSORBIDE DINITRATE 30 MILLIGRAM(S): 5 TABLET ORAL at 13:31

## 2019-08-17 RX ADMIN — Medication 50 MILLIGRAM(S): at 13:31

## 2019-08-17 RX ADMIN — Medication 1 TABLET(S): at 11:42

## 2019-08-17 RX ADMIN — BUMETANIDE 3 MILLIGRAM(S): 0.25 INJECTION INTRAMUSCULAR; INTRAVENOUS at 17:05

## 2019-08-17 NOTE — PROGRESS NOTE ADULT - ASSESSMENT
Assessment  DMT2: 73y Female with DM T2 with hyperglycemia on insulin, blood sugars still high  CAD: S/P cabg On medications, stable, monitored.  Hypothyroidism:  On synthroid 50  mcg po daily, asymptomatic.  HTN: Controlled, On med.  HLD; on statin  CKD: Monitor labs/BMP,         Plan:   DMT2:  Will increase Lantus to 66 units at bed time.  Continue Humalog bolus each meal in addition to Humalog correction scale coverage.  Patient counseled for compliance with consistent low carb diet.   CAD: S/P cabg On medications, stable, monitored.  Hypothyroidism: tsh 2.0  On synthroid 50  mcg po daily, asymptomatic. F/U tsh as outpatient  HTN: Continue treatment, monitoring, Primary team FU  HLD; on statin  CKD: Continue monitoring labs, renal FU  Case discussed with the NP  erika Woodard MD  [phone]

## 2019-08-17 NOTE — PROGRESS NOTE ADULT - ASSESSMENT
Pt is a 72 yo woman with PMHx of HTN, T2DM, CAD s/p CABG (LIMA to LAD, SVG to OM, SVG to PDA 2014 at [facility]), non-dilated ICM (EF 20-25%), severe mitral regurgitation s/p mitral clip (6/13/19 Dr. Newman), severe pulm HTN, CKD, hypothyroidism admitted with worsening SOB.    A/P:  MERVIN  Likely secondary to cardiogenic shock  Renal function stable.  Pt currently on Bumex 3 mg PO Q12 and remains on oral Spironolactone. Continue diuretic therapy per cardiology.  Optimize glucose control  Monitor renal function   Avoid further nephrotoxics, NSAIDS, RCA    CKD stage 4:  baseline Scr 1.8-2.0. Scr stable at 1.9 today.   Renal function fluctuates sec to CHF  Monitor renal function at present    SOB:  in setting of HF. Improved on diuretic therapy.   Continue diuretics per cardiology    Acidosis   Sec to Renal failure  Improved and is currently at goal   Monitor serum Co2 at present    Hypokalemia:  in the setting of diuretic therapy.   Serum potassium stable today     Anemia:   Hb stable  Pt with iron deficiency anemia.

## 2019-08-17 NOTE — PROGRESS NOTE ADULT - SUBJECTIVE AND OBJECTIVE BOX
SELVIN CLAIRE  73y  Patient is a 73y old  Female who presents with a chief complaint of Hypoxia, SOB (16 Aug 2019 16:50)    HPI:  Followed for MERVIN on CKD. STable at present. She has no new complaints at this time.    HEALTH ISSUES - PROBLEM Dx:  CKD (chronic kidney disease), stage IV: CKD (chronic kidney disease), stage IV  Hypothyroidism, unspecified type: Hypothyroidism, unspecified type  Essential hypertension: Essential hypertension  Mixed hyperlipidemia: Mixed hyperlipidemia  Coronary artery disease involving bypass graft of transplanted heart, angina presence unspecified: Coronary artery disease involving bypass graft of transplanted heart, angina presence unspecified  Type 2 diabetes mellitus with other kidney complication: Type 2 diabetes mellitus with other kidney complication  Anorexia: Anorexia  Anemia: Anemia  Palliative care encounter: Palliative care encounter  Debility: Debility  MERVIN (acute kidney injury): MERVIN (acute kidney injury)  Fatigue, unspecified type: Fatigue, unspecified type  Acute renal failure: Acute renal failure  SVT (supraventricular tachycardia): SVT (supraventricular tachycardia)  CAD (coronary artery disease): CAD (coronary artery disease)  Severe mitral regurgitation: Severe mitral regurgitation  Acute on chronic systolic heart failure: Acute on chronic systolic heart failure  Fluid overload: Fluid overload        MEDICATIONS  (STANDING):  acetaminophen  IVPB .. 1000 milliGRAM(s) IV Intermittent once  aspirin enteric coated 81 milliGRAM(s) Oral daily  atorvastatin 40 milliGRAM(s) Oral at bedtime  buMETAnide 3 milliGRAM(s) Oral every 12 hours  chlorhexidine 2% Cloths 1 Application(s) Topical daily  clopidogrel Tablet 75 milliGRAM(s) Oral daily  dextrose 5%. 1000 milliLiter(s) (50 mL/Hr) IV Continuous <Continuous>  dextrose 50% Injectable 12.5 Gram(s) IV Push once  dextrose 50% Injectable 25 Gram(s) IV Push once  dextrose 50% Injectable 25 Gram(s) IV Push once  docusate sodium 100 milliGRAM(s) Oral two times a day  heparin  Injectable 5000 Unit(s) SubCutaneous every 12 hours  hydrALAZINE 50 milliGRAM(s) Oral every 8 hours  insulin glargine Injectable (LANTUS) 60 Unit(s) SubCutaneous at bedtime  insulin glargine Injectable (LANTUS) 52 Unit(s) SubCutaneous every morning  insulin lispro (HumaLOG) corrective regimen sliding scale   SubCutaneous three times a day before meals  insulin lispro (HumaLOG) corrective regimen sliding scale   SubCutaneous at bedtime  insulin lispro Injectable (HumaLOG) 44 Unit(s) SubCutaneous before breakfast  insulin lispro Injectable (HumaLOG) 44 Unit(s) SubCutaneous before lunch  insulin lispro Injectable (HumaLOG) 50 Unit(s) SubCutaneous before dinner  isosorbide   dinitrate Tablet (ISORDIL) 30 milliGRAM(s) Oral three times a day  levothyroxine 50 MICROGram(s) Oral daily  lidocaine   Patch 1 Patch Transdermal daily  melatonin 3 milliGRAM(s) Oral at bedtime  metoprolol succinate ER 25 milliGRAM(s) Oral daily  montelukast 10 milliGRAM(s) Oral daily  Nephro-enrico 1 Tablet(s) Oral daily  pantoprazole    Tablet 40 milliGRAM(s) Oral before breakfast  polyethylene glycol 3350 17 Gram(s) Oral daily  senna 2 Tablet(s) Oral at bedtime  spironolactone 25 milliGRAM(s) Oral daily    MEDICATIONS  (PRN):  acetaminophen   Tablet .. 650 milliGRAM(s) Oral every 6 hours PRN Mild Pain (1 - 3), Moderate Pain (4 - 6)  ALPRAZolam 0.25 milliGRAM(s) Oral three times a day PRN anxiety  dextrose 40% Gel 15 Gram(s) Oral once PRN Blood Glucose LESS THAN 70 milliGRAM(s)/deciliter  glucagon  Injectable 1 milliGRAM(s) IntraMuscular once PRN Glucose LESS THAN 70 milligrams/deciliter  traMADol 25 milliGRAM(s) Oral every 6 hours PRN Moderate Pain (4 - 6)    Vital Signs Last 24 Hrs  T(C): 37.1 (17 Aug 2019 04:36), Max: 37.1 (17 Aug 2019 04:36)  T(F): 98.8 (17 Aug 2019 04:36), Max: 98.8 (17 Aug 2019 04:36)  HR: 81 (17 Aug 2019 04:36) (75 - 91)  BP: 107/59 (17 Aug 2019 04:36) (92/57 - 117/65)  BP(mean): --  RR: 18 (17 Aug 2019 04:36) (18 - 18)  SpO2: 100% (17 Aug 2019 04:36) (98% - 100%)  Daily     Daily     PHYSICAL EXAM:  Constitutional: She appears comfortable and not distressed. Not diaphoretic.    Neck:  The thyroid is normal. Trachea is midline.     Respiratory: The lungs are clear to auscultation. No dullness and expansion is normal.    Cardiovascular: S1 and S2 are normal. No mummurs, rubs or gallops are present.    Gastrointestinal: The abdomen is soft. No tenderness is present. No masses are present. Bowel sounds are normal.    Genitourinary: The bladder is not distended. No CVA tenderness is present.    Extremities: No edema is noted. No deformities are present.    Neurological: Cognition is normal. Tone, power and sensation are normal.     Skin: No lesions are seen  or palpated.    Lymph Nodes: No lymphadenopathy is present.      08-17    137  |  102  |  73<H>  ----------------------------<  238<H>  3.6   |  19<L>  |  1.86<H>    Ca    9.6      17 Aug 2019 05:34

## 2019-08-17 NOTE — PROGRESS NOTE ADULT - SUBJECTIVE AND OBJECTIVE BOX
CHIEF COMPLAINT:  patient condition improving she is without S OB but still complained of weakness her blood sugar is still high around 220/290 waiting for endocrine consult/follow-up recommendation.  Nephrology follow-up appreciated  SUBJECTIVE:     REVIEW OF SYSTEMS:    CONSTITUTIONAL: ( x )  weakness,  (  ) fevers or chills  EYES/ENT: (  )visual changes;     NECK: (  ) pain or stiffness  RESPIRATORY:   (  )cough, wheezing, hemoptysis;  (  ) shortness of breath  CARDIOVASCULAR:  (  )chest pain or palpitations  GASTROINTESTINAL:   (  )abdominal or epigastric pain.  (  ) nausea, vomiting, or hematemesis;   (   ) diarrhea or constipation.   GENITOURINARY:   (    ) dysuria, frequency or hematuria  NEUROLOGICAL:  (   ) numbness or weakness   All other review of systems is negative unless indicated above    Vital Signs Last 24 Hrs  T(C): 36.6 (17 Aug 2019 12:18), Max: 37.1 (17 Aug 2019 04:36)  T(F): 97.9 (17 Aug 2019 12:18), Max: 98.8 (17 Aug 2019 04:36)  HR: 93 (17 Aug 2019 17:03) (75 - 93)  BP: 111/62 (17 Aug 2019 17:03) (107/59 - 118/65)  BP(mean): --  RR: 18 (17 Aug 2019 12:18) (18 - 18)  SpO2: 99% (17 Aug 2019 12:18) (99% - 100%)    I&O's Summary    16 Aug 2019 07:01  -  17 Aug 2019 07:00  --------------------------------------------------------  IN: 860 mL / OUT: 1225 mL / NET: -365 mL    17 Aug 2019 07:01  -  17 Aug 2019 17:57  --------------------------------------------------------  IN: 540 mL / OUT: 525 mL / NET: 15 mL        CAPILLARY BLOOD GLUCOSE      POCT Blood Glucose.: 290 mg/dL (17 Aug 2019 16:13)  POCT Blood Glucose.: 244 mg/dL (17 Aug 2019 11:44)  POCT Blood Glucose.: 224 mg/dL (17 Aug 2019 07:37)  POCT Blood Glucose.: 293 mg/dL (16 Aug 2019 21:14)      PHYSICAL EXAM:    Constitutional:  ( x  ) NAD,   (  x )awake and alert  HEENT: PERR, EOMI,    Neck: Soft and supple, No LAD, No JVD  Respiratory:  (  x  Breath sounds are clear bilaterally,    (   ) wheezing, rales or rhonchi  Cardiovascular:     ( x  )S1 and S2, regular rate and rhythm, no Murmurs, gallops or rubs  Gastrointestinal:  ( x  )Bowel Sounds present, soft,   (  )nontender, nondistended,    Extremities:    (  ) peripheral edema  Vascular: 2+ peripheral pulses  Neurological:    ( x   )A/O x 3,   (  ) focal deficits  Musculoskeletal:    ( x  )  normal strength b/l upper  (     ) normal  lower extremities  Skin: No rashes    MEDICATIONS:  MEDICATIONS  (STANDING):  acetaminophen  IVPB .. 1000 milliGRAM(s) IV Intermittent once  aspirin enteric coated 81 milliGRAM(s) Oral daily  atorvastatin 40 milliGRAM(s) Oral at bedtime  buMETAnide 3 milliGRAM(s) Oral every 12 hours  chlorhexidine 2% Cloths 1 Application(s) Topical daily  clopidogrel Tablet 75 milliGRAM(s) Oral daily  dextrose 5%. 1000 milliLiter(s) (50 mL/Hr) IV Continuous <Continuous>  dextrose 50% Injectable 12.5 Gram(s) IV Push once  dextrose 50% Injectable 25 Gram(s) IV Push once  dextrose 50% Injectable 25 Gram(s) IV Push once  docusate sodium 100 milliGRAM(s) Oral two times a day  heparin  Injectable 5000 Unit(s) SubCutaneous every 12 hours  hydrALAZINE 50 milliGRAM(s) Oral every 8 hours  insulin glargine Injectable (LANTUS) 66 Unit(s) SubCutaneous at bedtime  insulin glargine Injectable (LANTUS) 52 Unit(s) SubCutaneous every morning  insulin lispro (HumaLOG) corrective regimen sliding scale   SubCutaneous three times a day before meals  insulin lispro (HumaLOG) corrective regimen sliding scale   SubCutaneous at bedtime  insulin lispro Injectable (HumaLOG) 44 Unit(s) SubCutaneous before breakfast  insulin lispro Injectable (HumaLOG) 44 Unit(s) SubCutaneous before lunch  insulin lispro Injectable (HumaLOG) 50 Unit(s) SubCutaneous before dinner  isosorbide   dinitrate Tablet (ISORDIL) 30 milliGRAM(s) Oral three times a day  levothyroxine 50 MICROGram(s) Oral daily  lidocaine   Patch 1 Patch Transdermal daily  melatonin 3 milliGRAM(s) Oral at bedtime  metoprolol succinate ER 25 milliGRAM(s) Oral daily  montelukast 10 milliGRAM(s) Oral daily  Nephro-enrico 1 Tablet(s) Oral daily  pantoprazole    Tablet 40 milliGRAM(s) Oral before breakfast  polyethylene glycol 3350 17 Gram(s) Oral daily  senna 2 Tablet(s) Oral at bedtime  spironolactone 25 milliGRAM(s) Oral daily      LABS: All Labs Reviewed:    08-17    137  |  102  |  73<H>  ----------------------------<  238<H>  3.6   |  19<L>  |  1.86<H>    Ca    9.6      17 Aug 2019 05:34            Blood Culture:   Urine Culture      RADIOLOGY/EKG:    ASSESSMENT AND PLAN:  74 yo woman with PMHx of HTN, T2DM (A1c 7.4%), CAD s/p CABG (LIMA to LAD, SVG to OM, SVG to PDA 2014 at [facility]), non-dilated ICM (EF 20-25%), severe mitral regurgitation s/p mitral clip (6/13/19 Dr. Newman), severe pulm HTN, CKD, hypothyroidism, who is presenting to ED after experiencing worsening SOB and intermittent [facility]. Of note, pt was recently discharged on 6/19 after having mitral clip procedure completed. She initially required BiPAP with improvement in her respiratory status following diuresis. Initiated on vasopressors and inotropes in CCU, which were subsequently weaned off. Infectious work up was negative.    #HFrEF likely 2/2 ICM with MR s/p alonso clip  -  continue with Bumex 3 mg twice daily   discuss with cardiology      -    - continue hydralazine 50 mg TID and isordil 30 TID  - continue spironolactone 25 mg  - continue Metoprolol Succinate 25 mg PO Daily   - No further cardiac testing at this time.      #CAD  - continue ASA and statin   - Pt with chest pain, but also with end stage cardiomyopathy, coronary disease, had MitraClip and no further intervention feasible. She should not have trops checked.    #SVT - likely Atrial Tach vs. Atrial Flutter   - Amio initially not given due to elevated LFTs likely in the setting of congestion.   - recheck LFTs.   - Monitor on tele.   - continue metoprolol at current dose as above  - If pt has SVT again can consider amio   #diabetes on  66 units Lantus at bedtime and 52 unit  Lantus in the morning  continue regular insulin 3 times a day  before meal.  Discussed with Endo on 8/16/2019 hopefully we will can increase her Lantus twice a day and only give her 1 time regular insulin  Before lunch if possible to prevent her from hypoglycemia at home.     DVT PPX:    ADVANCED DIRECTIVE:    DISPOSITION:

## 2019-08-17 NOTE — PROGRESS NOTE ADULT - SUBJECTIVE AND OBJECTIVE BOX
Chief complaint  Patient is a 73y old  Female who presents with a chief complaint of Hypoxia, SOB (17 Aug 2019 11:20)   Review of systems  Patient in bed, looks comfortable, no fever, no hypoglycemia.    Labs and Fingersticks  CAPILLARY BLOOD GLUCOSE      POCT Blood Glucose.: 290 mg/dL (17 Aug 2019 16:13)  POCT Blood Glucose.: 244 mg/dL (17 Aug 2019 11:44)  POCT Blood Glucose.: 224 mg/dL (17 Aug 2019 07:37)  POCT Blood Glucose.: 293 mg/dL (16 Aug 2019 21:14)      Anion Gap, Serum: 16 (08-17 @ 05:34)  Anion Gap, Serum: 17 (08-16 @ 06:08)      Calcium, Total Serum: 9.6 (08-17 @ 05:34)  Calcium, Total Serum: 9.9 (08-16 @ 06:08)          08-17    137  |  102  |  73<H>  ----------------------------<  238<H>  3.6   |  19<L>  |  1.86<H>    Ca    9.6      17 Aug 2019 05:34      Medications  MEDICATIONS  (STANDING):  acetaminophen  IVPB .. 1000 milliGRAM(s) IV Intermittent once  aspirin enteric coated 81 milliGRAM(s) Oral daily  atorvastatin 40 milliGRAM(s) Oral at bedtime  buMETAnide 3 milliGRAM(s) Oral every 12 hours  chlorhexidine 2% Cloths 1 Application(s) Topical daily  clopidogrel Tablet 75 milliGRAM(s) Oral daily  dextrose 5%. 1000 milliLiter(s) (50 mL/Hr) IV Continuous <Continuous>  dextrose 50% Injectable 12.5 Gram(s) IV Push once  dextrose 50% Injectable 25 Gram(s) IV Push once  dextrose 50% Injectable 25 Gram(s) IV Push once  docusate sodium 100 milliGRAM(s) Oral two times a day  heparin  Injectable 5000 Unit(s) SubCutaneous every 12 hours  hydrALAZINE 50 milliGRAM(s) Oral every 8 hours  insulin glargine Injectable (LANTUS) 66 Unit(s) SubCutaneous at bedtime  insulin glargine Injectable (LANTUS) 52 Unit(s) SubCutaneous every morning  insulin lispro (HumaLOG) corrective regimen sliding scale   SubCutaneous three times a day before meals  insulin lispro (HumaLOG) corrective regimen sliding scale   SubCutaneous at bedtime  insulin lispro Injectable (HumaLOG) 44 Unit(s) SubCutaneous before breakfast  insulin lispro Injectable (HumaLOG) 44 Unit(s) SubCutaneous before lunch  insulin lispro Injectable (HumaLOG) 50 Unit(s) SubCutaneous before dinner  isosorbide   dinitrate Tablet (ISORDIL) 30 milliGRAM(s) Oral three times a day  levothyroxine 50 MICROGram(s) Oral daily  lidocaine   Patch 1 Patch Transdermal daily  melatonin 3 milliGRAM(s) Oral at bedtime  metoprolol succinate ER 25 milliGRAM(s) Oral daily  montelukast 10 milliGRAM(s) Oral daily  Nephro-enrico 1 Tablet(s) Oral daily  pantoprazole    Tablet 40 milliGRAM(s) Oral before breakfast  polyethylene glycol 3350 17 Gram(s) Oral daily  senna 2 Tablet(s) Oral at bedtime  spironolactone 25 milliGRAM(s) Oral daily      Physical Exam  General: Patient comfortable in bed  Vital Signs Last 12 Hrs  T(F): 97.9 (08-17-19 @ 12:18), Max: 97.9 (08-17-19 @ 12:18)  HR: 93 (08-17-19 @ 17:03) (90 - 93)  BP: 111/62 (08-17-19 @ 17:03) (111/62 - 118/65)  BP(mean): --  RR: 18 (08-17-19 @ 12:18) (18 - 18)  SpO2: 99% (08-17-19 @ 12:18) (99% - 99%)  Neck: No palpable thyroid nodules.  CVS: S1S2, No murmurs  Respiratory: No wheezing, no crepitations  GI: Abdomen soft, bowel sounds positive  Musculoskeletal:  edema lower extremities.   Skin: No skin rashes, no ecchymosis    Diagnostics    Cortisol AM, Serum: AM Sched. Collection: 18-Aug-2019 06:00 (08-17 @ 12:14)

## 2019-08-18 LAB
ANION GAP SERPL CALC-SCNC: 19 MMOL/L — HIGH (ref 5–17)
BUN SERPL-MCNC: 74 MG/DL — HIGH (ref 7–23)
CALCIUM SERPL-MCNC: 9.9 MG/DL — SIGNIFICANT CHANGE UP (ref 8.4–10.5)
CHLORIDE SERPL-SCNC: 99 MMOL/L — SIGNIFICANT CHANGE UP (ref 96–108)
CO2 SERPL-SCNC: 19 MMOL/L — LOW (ref 22–31)
CORTIS AM PEAK SERPL-MCNC: 20.5 UG/DL — HIGH (ref 6–18.4)
CREAT SERPL-MCNC: 1.92 MG/DL — HIGH (ref 0.5–1.3)
GLUCOSE BLDC GLUCOMTR-MCNC: 291 MG/DL — HIGH (ref 70–99)
GLUCOSE BLDC GLUCOMTR-MCNC: 320 MG/DL — HIGH (ref 70–99)
GLUCOSE BLDC GLUCOMTR-MCNC: 322 MG/DL — HIGH (ref 70–99)
GLUCOSE BLDC GLUCOMTR-MCNC: 322 MG/DL — HIGH (ref 70–99)
GLUCOSE SERPL-MCNC: 290 MG/DL — HIGH (ref 70–99)
HCT VFR BLD CALC: 29.1 % — LOW (ref 34.5–45)
HGB BLD-MCNC: 9.4 G/DL — LOW (ref 11.5–15.5)
MCHC RBC-ENTMCNC: 28.1 PG — SIGNIFICANT CHANGE UP (ref 27–34)
MCHC RBC-ENTMCNC: 32.3 GM/DL — SIGNIFICANT CHANGE UP (ref 32–36)
MCV RBC AUTO: 86.9 FL — SIGNIFICANT CHANGE UP (ref 80–100)
PLATELET # BLD AUTO: 352 K/UL — SIGNIFICANT CHANGE UP (ref 150–400)
POTASSIUM SERPL-MCNC: 3.7 MMOL/L — SIGNIFICANT CHANGE UP (ref 3.5–5.3)
POTASSIUM SERPL-SCNC: 3.7 MMOL/L — SIGNIFICANT CHANGE UP (ref 3.5–5.3)
RBC # BLD: 3.35 M/UL — LOW (ref 3.8–5.2)
RBC # FLD: 16 % — HIGH (ref 10.3–14.5)
SODIUM SERPL-SCNC: 137 MMOL/L — SIGNIFICANT CHANGE UP (ref 135–145)
WBC # BLD: 10.21 K/UL — SIGNIFICANT CHANGE UP (ref 3.8–10.5)
WBC # FLD AUTO: 10.21 K/UL — SIGNIFICANT CHANGE UP (ref 3.8–10.5)

## 2019-08-18 RX ORDER — DEXAMETHASONE 0.5 MG/5ML
1 ELIXIR ORAL ONCE
Refills: 0 | Status: COMPLETED | OUTPATIENT
Start: 2019-08-18 | End: 2019-08-18

## 2019-08-18 RX ADMIN — Medication 1 TABLET(S): at 11:38

## 2019-08-18 RX ADMIN — Medication 3: at 07:56

## 2019-08-18 RX ADMIN — Medication 81 MILLIGRAM(S): at 11:38

## 2019-08-18 RX ADMIN — LIDOCAINE 1 PATCH: 4 CREAM TOPICAL at 21:50

## 2019-08-18 RX ADMIN — Medication 50 UNIT(S): at 16:53

## 2019-08-18 RX ADMIN — Medication 25 MILLIGRAM(S): at 05:42

## 2019-08-18 RX ADMIN — MONTELUKAST 10 MILLIGRAM(S): 4 TABLET, CHEWABLE ORAL at 11:38

## 2019-08-18 RX ADMIN — Medication 4: at 12:16

## 2019-08-18 RX ADMIN — BUMETANIDE 3 MILLIGRAM(S): 0.25 INJECTION INTRAMUSCULAR; INTRAVENOUS at 05:41

## 2019-08-18 RX ADMIN — INSULIN GLARGINE 52 UNIT(S): 100 INJECTION, SOLUTION SUBCUTANEOUS at 07:55

## 2019-08-18 RX ADMIN — Medication 0.25 MILLIGRAM(S): at 13:58

## 2019-08-18 RX ADMIN — Medication 2: at 21:50

## 2019-08-18 RX ADMIN — PANTOPRAZOLE SODIUM 40 MILLIGRAM(S): 20 TABLET, DELAYED RELEASE ORAL at 05:43

## 2019-08-18 RX ADMIN — Medication 44 UNIT(S): at 07:56

## 2019-08-18 RX ADMIN — Medication 3 MILLIGRAM(S): at 21:48

## 2019-08-18 RX ADMIN — HEPARIN SODIUM 5000 UNIT(S): 5000 INJECTION INTRAVENOUS; SUBCUTANEOUS at 05:42

## 2019-08-18 RX ADMIN — Medication 50 MILLIGRAM(S): at 21:48

## 2019-08-18 RX ADMIN — INSULIN GLARGINE 66 UNIT(S): 100 INJECTION, SOLUTION SUBCUTANEOUS at 21:48

## 2019-08-18 RX ADMIN — SENNA PLUS 2 TABLET(S): 8.6 TABLET ORAL at 21:48

## 2019-08-18 RX ADMIN — Medication 50 MILLIGRAM(S): at 13:56

## 2019-08-18 RX ADMIN — Medication 0.25 MILLIGRAM(S): at 21:49

## 2019-08-18 RX ADMIN — Medication 650 MILLIGRAM(S): at 22:10

## 2019-08-18 RX ADMIN — Medication 1 MILLIGRAM(S): at 22:57

## 2019-08-18 RX ADMIN — Medication 0.25 MILLIGRAM(S): at 07:56

## 2019-08-18 RX ADMIN — CHLORHEXIDINE GLUCONATE 1 APPLICATION(S): 213 SOLUTION TOPICAL at 21:48

## 2019-08-18 RX ADMIN — TRAMADOL HYDROCHLORIDE 25 MILLIGRAM(S): 50 TABLET ORAL at 13:57

## 2019-08-18 RX ADMIN — LIDOCAINE 1 PATCH: 4 CREAM TOPICAL at 07:00

## 2019-08-18 RX ADMIN — HEPARIN SODIUM 5000 UNIT(S): 5000 INJECTION INTRAVENOUS; SUBCUTANEOUS at 17:06

## 2019-08-18 RX ADMIN — LIDOCAINE 1 PATCH: 4 CREAM TOPICAL at 09:00

## 2019-08-18 RX ADMIN — ATORVASTATIN CALCIUM 40 MILLIGRAM(S): 80 TABLET, FILM COATED ORAL at 21:50

## 2019-08-18 RX ADMIN — Medication 650 MILLIGRAM(S): at 21:49

## 2019-08-18 RX ADMIN — Medication 50 MILLIGRAM(S): at 05:42

## 2019-08-18 RX ADMIN — BUMETANIDE 3 MILLIGRAM(S): 0.25 INJECTION INTRAMUSCULAR; INTRAVENOUS at 17:06

## 2019-08-18 RX ADMIN — Medication 44 UNIT(S): at 12:17

## 2019-08-18 RX ADMIN — SPIRONOLACTONE 25 MILLIGRAM(S): 25 TABLET, FILM COATED ORAL at 05:42

## 2019-08-18 RX ADMIN — ISOSORBIDE DINITRATE 30 MILLIGRAM(S): 5 TABLET ORAL at 05:42

## 2019-08-18 RX ADMIN — ISOSORBIDE DINITRATE 30 MILLIGRAM(S): 5 TABLET ORAL at 13:56

## 2019-08-18 RX ADMIN — Medication 100 MILLIGRAM(S): at 05:42

## 2019-08-18 RX ADMIN — CLOPIDOGREL BISULFATE 75 MILLIGRAM(S): 75 TABLET, FILM COATED ORAL at 11:38

## 2019-08-18 RX ADMIN — Medication 4: at 16:53

## 2019-08-18 RX ADMIN — ISOSORBIDE DINITRATE 30 MILLIGRAM(S): 5 TABLET ORAL at 21:48

## 2019-08-18 RX ADMIN — TRAMADOL HYDROCHLORIDE 25 MILLIGRAM(S): 50 TABLET ORAL at 14:30

## 2019-08-18 RX ADMIN — Medication 50 MICROGRAM(S): at 05:42

## 2019-08-18 NOTE — PROGRESS NOTE ADULT - ASSESSMENT
Assessment  DMT2: 73y Female with DM T2 with hyperglycemia on insulin, blood sugars still high, insulin dose adjusted, am cortisol slightly elevated, she is eating partial meals.  CAD: S/P cabg On medications, stable, monitored.  Hypothyroidism:  On synthroid 50  mcg po daily, asymptomatic.  HTN: Controlled, On med.  HLD; on statin  CKD: Monitor labs/BMP,         Plan:   DMT2:  Will continue current insulin regimen for now. Will continue monitoring FS, log, will Follow up. Since cortisol slightly elevated will R/O cushing's before DC home, will give Dexamethasone 1mg at 11pm and check am cortisol levels. Discussed with primary team.  Patient counseled for compliance with consistent low carb diet.   CAD: S/P cabg On medications, stable, monitored.  Hypothyroidism: tsh 2.0  On synthroid 50  mcg po daily, asymptomatic. F/U tsh as outpatient  HTN: Continue treatment, monitoring, Primary team FU  HLD; on statin  CKD: Continue monitoring labs, renal FU

## 2019-08-18 NOTE — PROGRESS NOTE ADULT - SUBJECTIVE AND OBJECTIVE BOX
SELVIN CLAIRE  73y  Patient is a 73y old  Female who presents with a chief complaint of Hypoxia, SOB (18 Aug 2019 16:33)    HPI:  This is a patient with Valvular Heart Disease and CHF, admitted with hypoxia and had EMRVIN. Her Creatinine has improved.  HEALTH ISSUES - PROBLEM Dx:  CKD (chronic kidney disease), stage IV: CKD (chronic kidney disease), stage IV  Hypothyroidism, unspecified type: Hypothyroidism, unspecified type  Essential hypertension: Essential hypertension  Mixed hyperlipidemia: Mixed hyperlipidemia  Coronary artery disease involving bypass graft of transplanted heart, angina presence unspecified: Coronary artery disease involving bypass graft of transplanted heart, angina presence unspecified  Type 2 diabetes mellitus with other kidney complication: Type 2 diabetes mellitus with other kidney complication  Anorexia: Anorexia  Anemia: Anemia  Palliative care encounter: Palliative care encounter  Debility: Debility  MERVIN (acute kidney injury): MERVIN (acute kidney injury)  Fatigue, unspecified type: Fatigue, unspecified type  Acute renal failure: Acute renal failure  SVT (supraventricular tachycardia): SVT (supraventricular tachycardia)  CAD (coronary artery disease): CAD (coronary artery disease)  Severe mitral regurgitation: Severe mitral regurgitation  Acute on chronic systolic heart failure: Acute on chronic systolic heart failure  Fluid overload: Fluid overload        MEDICATIONS  (STANDING):  acetaminophen  IVPB .. 1000 milliGRAM(s) IV Intermittent once  aspirin enteric coated 81 milliGRAM(s) Oral daily  atorvastatin 40 milliGRAM(s) Oral at bedtime  buMETAnide 3 milliGRAM(s) Oral every 12 hours  chlorhexidine 2% Cloths 1 Application(s) Topical daily  clopidogrel Tablet 75 milliGRAM(s) Oral daily  dexamethasone     Tablet 1 milliGRAM(s) Oral once  dextrose 5%. 1000 milliLiter(s) (50 mL/Hr) IV Continuous <Continuous>  dextrose 50% Injectable 12.5 Gram(s) IV Push once  dextrose 50% Injectable 25 Gram(s) IV Push once  dextrose 50% Injectable 25 Gram(s) IV Push once  docusate sodium 100 milliGRAM(s) Oral two times a day  heparin  Injectable 5000 Unit(s) SubCutaneous every 12 hours  hydrALAZINE 50 milliGRAM(s) Oral every 8 hours  insulin glargine Injectable (LANTUS) 66 Unit(s) SubCutaneous at bedtime  insulin glargine Injectable (LANTUS) 52 Unit(s) SubCutaneous every morning  insulin lispro (HumaLOG) corrective regimen sliding scale   SubCutaneous three times a day before meals  insulin lispro (HumaLOG) corrective regimen sliding scale   SubCutaneous at bedtime  insulin lispro Injectable (HumaLOG) 44 Unit(s) SubCutaneous before breakfast  insulin lispro Injectable (HumaLOG) 44 Unit(s) SubCutaneous before lunch  insulin lispro Injectable (HumaLOG) 50 Unit(s) SubCutaneous before dinner  isosorbide   dinitrate Tablet (ISORDIL) 30 milliGRAM(s) Oral three times a day  levothyroxine 50 MICROGram(s) Oral daily  lidocaine   Patch 1 Patch Transdermal daily  melatonin 3 milliGRAM(s) Oral at bedtime  metoprolol succinate ER 25 milliGRAM(s) Oral daily  montelukast 10 milliGRAM(s) Oral daily  Nephro-enrico 1 Tablet(s) Oral daily  pantoprazole    Tablet 40 milliGRAM(s) Oral before breakfast  polyethylene glycol 3350 17 Gram(s) Oral daily  senna 2 Tablet(s) Oral at bedtime  spironolactone 25 milliGRAM(s) Oral daily    MEDICATIONS  (PRN):  acetaminophen   Tablet .. 650 milliGRAM(s) Oral every 6 hours PRN Mild Pain (1 - 3), Moderate Pain (4 - 6)  ALPRAZolam 0.25 milliGRAM(s) Oral three times a day PRN anxiety  dextrose 40% Gel 15 Gram(s) Oral once PRN Blood Glucose LESS THAN 70 milliGRAM(s)/deciliter  glucagon  Injectable 1 milliGRAM(s) IntraMuscular once PRN Glucose LESS THAN 70 milligrams/deciliter  traMADol 25 milliGRAM(s) Oral every 6 hours PRN Moderate Pain (4 - 6)    Vital Signs Last 24 Hrs  T(C): 36.7 (18 Aug 2019 11:19), Max: 37 (17 Aug 2019 19:40)  T(F): 98 (18 Aug 2019 11:19), Max: 98.6 (17 Aug 2019 19:40)  HR: 92 (18 Aug 2019 13:52) (86 - 93)  BP: 129/68 (18 Aug 2019 13:52) (107/58 - 129/68)  BP(mean): --  RR: 18 (18 Aug 2019 11:19) (18 - 18)  SpO2: 98% (18 Aug 2019 11:19) (98% - 100%)  Daily     Daily Weight in k.2 (18 Aug 2019 11:19)    PHYSICAL EXAM:  Constitutional: She  appears comfortable and not distressed. Not diaphoretic.    Respiratory: The lungs are clear to auscultation. No dullness and expansion is normal.    Cardiovascular: S1 and S2 are normal. No mummurs, rubs or gallops are present.    Gastrointestinal: The abdomen is soft. No tenderness is present. No masses are present. Bowel sounds are normal.    Genitourinary: The bladder is not distended. No CVA tenderness is present.    Extremities: No edema is noted. No deformities are present.    Neurological: Cognition appears normal. Tone, power and sensation are normal.    Skin: No leasions are seen  or palpated.    Lymph Nodes: No lymphadenopathy is present.    Psychiatric: Mood is appropriate. No hallucinations or flight of ideas are noted.                              9.4    10.21 )-----------( 352      ( 18 Aug 2019 08:21 )             29.1     08    137  |  99  |  74<H>  ----------------------------<  290<H>  3.7   |  19<L>  |  1.92<H>    Ca    9.9      18 Aug 2019 06:00

## 2019-08-18 NOTE — PROGRESS NOTE ADULT - SUBJECTIVE AND OBJECTIVE BOX
Chief complaint  Patient is a 73y old  Female who presents with a chief complaint of Hypoxia, SOB (18 Aug 2019 09:11)   Review of systems  Patient in bed, looks comfortable, no fever, no hypoglycemia.    Labs and Fingersticks  CAPILLARY BLOOD GLUCOSE      POCT Blood Glucose.: 322 mg/dL (18 Aug 2019 16:13)  POCT Blood Glucose.: 322 mg/dL (18 Aug 2019 11:52)  POCT Blood Glucose.: 291 mg/dL (18 Aug 2019 07:43)  POCT Blood Glucose.: 391 mg/dL (17 Aug 2019 21:24)      Anion Gap, Serum: 19 <H> (08-18 @ 06:00)  Anion Gap, Serum: 16 (08-17 @ 05:34)      Calcium, Total Serum: 9.9 (08-18 @ 06:00)  Calcium, Total Serum: 9.6 (08-17 @ 05:34)      08-18    137  |  99  |  74<H>  ----------------------------<  290<H>  3.7   |  19<L>  |  1.92<H>    Ca    9.9      18 Aug 2019 06:00                          9.4    10.21 )-----------( 352      ( 18 Aug 2019 08:21 )             29.1     Medications  MEDICATIONS  (STANDING):  acetaminophen  IVPB .. 1000 milliGRAM(s) IV Intermittent once  aspirin enteric coated 81 milliGRAM(s) Oral daily  atorvastatin 40 milliGRAM(s) Oral at bedtime  buMETAnide 3 milliGRAM(s) Oral every 12 hours  chlorhexidine 2% Cloths 1 Application(s) Topical daily  clopidogrel Tablet 75 milliGRAM(s) Oral daily  dexamethasone     Tablet 1 milliGRAM(s) Oral once  dextrose 5%. 1000 milliLiter(s) (50 mL/Hr) IV Continuous <Continuous>  dextrose 50% Injectable 12.5 Gram(s) IV Push once  dextrose 50% Injectable 25 Gram(s) IV Push once  dextrose 50% Injectable 25 Gram(s) IV Push once  docusate sodium 100 milliGRAM(s) Oral two times a day  heparin  Injectable 5000 Unit(s) SubCutaneous every 12 hours  hydrALAZINE 50 milliGRAM(s) Oral every 8 hours  insulin glargine Injectable (LANTUS) 66 Unit(s) SubCutaneous at bedtime  insulin glargine Injectable (LANTUS) 52 Unit(s) SubCutaneous every morning  insulin lispro (HumaLOG) corrective regimen sliding scale   SubCutaneous three times a day before meals  insulin lispro (HumaLOG) corrective regimen sliding scale   SubCutaneous at bedtime  insulin lispro Injectable (HumaLOG) 44 Unit(s) SubCutaneous before breakfast  insulin lispro Injectable (HumaLOG) 44 Unit(s) SubCutaneous before lunch  insulin lispro Injectable (HumaLOG) 50 Unit(s) SubCutaneous before dinner  isosorbide   dinitrate Tablet (ISORDIL) 30 milliGRAM(s) Oral three times a day  levothyroxine 50 MICROGram(s) Oral daily  lidocaine   Patch 1 Patch Transdermal daily  melatonin 3 milliGRAM(s) Oral at bedtime  metoprolol succinate ER 25 milliGRAM(s) Oral daily  montelukast 10 milliGRAM(s) Oral daily  Nephro-enrico 1 Tablet(s) Oral daily  pantoprazole    Tablet 40 milliGRAM(s) Oral before breakfast  polyethylene glycol 3350 17 Gram(s) Oral daily  senna 2 Tablet(s) Oral at bedtime  spironolactone 25 milliGRAM(s) Oral daily      Physical Exam  General: Patient comfortable in bed  Vital Signs Last 12 Hrs  T(F): 98 (08-18-19 @ 11:19), Max: 98 (08-18-19 @ 11:19)  HR: 92 (08-18-19 @ 13:52) (87 - 92)  BP: 129/68 (08-18-19 @ 13:52) (107/58 - 129/68)  BP(mean): --  RR: 18 (08-18-19 @ 11:19) (18 - 18)  SpO2: 98% (08-18-19 @ 11:19) (98% - 98%)  Neck: No palpable thyroid nodules.  CVS: S1S2, No murmurs  Respiratory: No wheezing, no crepitations  GI: Abdomen soft, bowel sounds positive  Musculoskeletal:  edema lower extremities.   Skin: No skin rashes, no ecchymosis    Diagnostics    Cortisol AM, Serum: AM Sched. Collection: 18-Aug-2019 06:00 (08-17 @ 12:14)  Cortisol AM, Serum: AM Sched. Collection: 19-Aug-2019 06:00 (08-18 @ 12:59)

## 2019-08-18 NOTE — PROGRESS NOTE ADULT - ASSESSMENT
Pt is a 72 yo woman with PMHx of HTN, T2DM, CAD s/p CABG (LIMA to LAD, SVG to OM, SVG to PDA 2014 at [facility]), non-dilated ICM (EF 20-25%), severe mitral regurgitation s/p mitral clip (6/13/19 Dr. Newman), severe pulm HTN, CKD, hypothyroidism admitted with worsening SOB.    A/P:  MERVIN  Likely due to cardiogenic shock  Renal function is stable at baseline but subject to fluctuations based on Renal Perfusion.  Pt currently on Bumex 3 mg PO Q12   - Continue diuretic therapy per cardiology.  - Optimize glucose control  - Monitor renal function   - Avoid further nephrotoxics, NSAIDS, RCA    CKD stage 4:  baseline Scr 1.8-2.0. Scr stable at 1.9 today.   Renal function fluctuates sec to CHF  Monitor renal function at present    SOB:  in setting of HF. Improved on diuretic therapy.   Continue diuretics per cardiology    Acidosis   Sec to Renal failure  Improved and is currently at goal   Monitor serum Co2 at present    Hypokalemia:  in the setting of diuretic therapy.   Serum potassium stable today     Anemia:   Hb stable  Pt with iron deficiency anemia.

## 2019-08-18 NOTE — PROGRESS NOTE ADULT - SUBJECTIVE AND OBJECTIVE BOX
CHIEF COMPLAINT:    SUBJECTIVE:     REVIEW OF SYSTEMS:    CONSTITUTIONAL: (  )  weakness,  (  ) fevers or chills  EYES/ENT: (  )visual changes;     NECK: (  ) pain or stiffness  RESPIRATORY:   (  )cough, wheezing, hemoptysis;  (  ) shortness of breath  CARDIOVASCULAR:  (  )chest pain or palpitations  GASTROINTESTINAL:   (  )abdominal or epigastric pain.  (  ) nausea, vomiting, or hematemesis;   (   ) diarrhea or constipation.   GENITOURINARY:   (    ) dysuria, frequency or hematuria  NEUROLOGICAL:  (   ) numbness or weakness   All other review of systems is negative unless indicated above    Vital Signs Last 24 Hrs  T(C): 36.9 (18 Aug 2019 04:21), Max: 37 (17 Aug 2019 19:40)  T(F): 98.4 (18 Aug 2019 04:21), Max: 98.6 (17 Aug 2019 19:40)  HR: 86 (18 Aug 2019 04:21) (86 - 93)  BP: 117/64 (18 Aug 2019 04:21) (111/62 - 118/65)  BP(mean): --  RR: 18 (18 Aug 2019 04:21) (18 - 18)  SpO2: 100% (18 Aug 2019 04:21) (99% - 100%)    I&O's Summary    17 Aug 2019 07:01  -  18 Aug 2019 07:00  --------------------------------------------------------  IN: 765 mL / OUT: 1250 mL / NET: -485 mL    18 Aug 2019 07:01  -  18 Aug 2019 09:12  --------------------------------------------------------  IN: 130 mL / OUT: 0 mL / NET: 130 mL        CAPILLARY BLOOD GLUCOSE      POCT Blood Glucose.: 291 mg/dL (18 Aug 2019 07:43)  POCT Blood Glucose.: 391 mg/dL (17 Aug 2019 21:24)  POCT Blood Glucose.: 290 mg/dL (17 Aug 2019 16:13)  POCT Blood Glucose.: 244 mg/dL (17 Aug 2019 11:44)      PHYSICAL EXAM:    Constitutional:  (   ) NAD,   (   )awake and alert  HEENT: PERR, EOMI,    Neck: Soft and supple, No LAD, No JVD  Respiratory:  (    Breath sounds are clear bilaterally,    (   ) wheezing, rales or rhonchi  Cardiovascular:     (   )S1 and S2, regular rate and rhythm, no Murmurs, gallops or rubs  Gastrointestinal:  (   )Bowel Sounds present, soft,   (  )nontender, nondistended,    Extremities:    (  ) peripheral edema  Vascular: 2+ peripheral pulses  Neurological:    (    )A/O x 3,   (  ) focal deficits  Musculoskeletal:    (   )  normal strength b/l upper  (     ) normal  lower extremities  Skin: No rashes    MEDICATIONS:  MEDICATIONS  (STANDING):  acetaminophen  IVPB .. 1000 milliGRAM(s) IV Intermittent once  aspirin enteric coated 81 milliGRAM(s) Oral daily  atorvastatin 40 milliGRAM(s) Oral at bedtime  buMETAnide 3 milliGRAM(s) Oral every 12 hours  chlorhexidine 2% Cloths 1 Application(s) Topical daily  clopidogrel Tablet 75 milliGRAM(s) Oral daily  dextrose 5%. 1000 milliLiter(s) (50 mL/Hr) IV Continuous <Continuous>  dextrose 50% Injectable 12.5 Gram(s) IV Push once  dextrose 50% Injectable 25 Gram(s) IV Push once  dextrose 50% Injectable 25 Gram(s) IV Push once  docusate sodium 100 milliGRAM(s) Oral two times a day  heparin  Injectable 5000 Unit(s) SubCutaneous every 12 hours  hydrALAZINE 50 milliGRAM(s) Oral every 8 hours  insulin glargine Injectable (LANTUS) 66 Unit(s) SubCutaneous at bedtime  insulin glargine Injectable (LANTUS) 52 Unit(s) SubCutaneous every morning  insulin lispro (HumaLOG) corrective regimen sliding scale   SubCutaneous three times a day before meals  insulin lispro (HumaLOG) corrective regimen sliding scale   SubCutaneous at bedtime  insulin lispro Injectable (HumaLOG) 44 Unit(s) SubCutaneous before breakfast  insulin lispro Injectable (HumaLOG) 44 Unit(s) SubCutaneous before lunch  insulin lispro Injectable (HumaLOG) 50 Unit(s) SubCutaneous before dinner  isosorbide   dinitrate Tablet (ISORDIL) 30 milliGRAM(s) Oral three times a day  levothyroxine 50 MICROGram(s) Oral daily  lidocaine   Patch 1 Patch Transdermal daily  melatonin 3 milliGRAM(s) Oral at bedtime  metoprolol succinate ER 25 milliGRAM(s) Oral daily  montelukast 10 milliGRAM(s) Oral daily  Nephro-enrico 1 Tablet(s) Oral daily  pantoprazole    Tablet 40 milliGRAM(s) Oral before breakfast  polyethylene glycol 3350 17 Gram(s) Oral daily  senna 2 Tablet(s) Oral at bedtime  spironolactone 25 milliGRAM(s) Oral daily      LABS: All Labs Reviewed:                        9.4    10.21 )-----------( 352      ( 18 Aug 2019 08:21 )             29.1     08-18    137  |  99  |  74<H>  ----------------------------<  290<H>  3.7   |  19<L>  |  1.92<H>    Ca    9.9      18 Aug 2019 06:00            Blood Culture:   Urine Culture      RADIOLOGY/EKG:    ASSESSMENT AND PLAN:    DVT PPX:    ADVANCED DIRECTIVE:    DISPOSITION: CHIEF COMPLAINT:  patient condition improving she is without S OB but still complained of weakness .  her blood sugar is still high  391 last night waiting for endocrine /follow-up recommendation.  Nephrology follow-up appreciated  SUBJECTIVE:     REVIEW OF SYSTEMS:    CONSTITUTIONAL: ( x )  weakness,  (  ) fevers or chills  EYES/ENT: (  )visual changes;     NECK: (  ) pain or stiffness  RESPIRATORY:   (  )cough, wheezing, hemoptysis;  (  ) shortness of breath  CARDIOVASCULAR:  (  )chest pain or palpitations  GASTROINTESTINAL:   (  )abdominal or epigastric pain.  (  ) nausea, vomiting, or hematemesis;   (   ) diarrhea or constipation.   GENITOURINARY:   (    ) dysuria, frequency or hematuria  NEUROLOGICAL:  (   ) numbness or weakness   All other review of systems is negative unless indicated above    Vital Signs Last 24 Hrs  T(C): 36.9 (18 Aug 2019 04:21), Max: 37 (17 Aug 2019 19:40)  T(F): 98.4 (18 Aug 2019 04:21), Max: 98.6 (17 Aug 2019 19:40)  HR: 86 (18 Aug 2019 04:21) (86 - 93)  BP: 117/64 (18 Aug 2019 04:21) (111/62 - 118/65)  BP(mean): --  RR: 18 (18 Aug 2019 04:21) (18 - 18)  SpO2: 100% (18 Aug 2019 04:21) (99% - 100%)    I&O's Summary    17 Aug 2019 07:01  -  18 Aug 2019 07:00  --------------------------------------------------------  IN: 765 mL / OUT: 1250 mL / NET: -485 mL    18 Aug 2019 07:01  -  18 Aug 2019 09:12  --------------------------------------------------------  IN: 130 mL / OUT: 0 mL / NET: 130 mL        CAPILLARY BLOOD GLUCOSE      POCT Blood Glucose.: 291 mg/dL (18 Aug 2019 07:43)  POCT Blood Glucose.: 391 mg/dL (17 Aug 2019 21:24)  POCT Blood Glucose.: 290 mg/dL (17 Aug 2019 16:13)  POCT Blood Glucose.: 244 mg/dL (17 Aug 2019 11:44)      PHYSICAL EXAM:  Constitutional:  ( x  ) NAD,   (  x )awake and alert  HEENT: PERR, EOMI,    Neck: Soft and supple, No LAD, No JVD  Respiratory:  (  x  Breath sounds are clear bilaterally,    (   ) wheezing, rales or rhonchi  Cardiovascular:     ( x  )S1 and S2, regular rate and rhythm, no Murmurs, gallops or rubs  Gastrointestinal:  ( x  )Bowel Sounds present, soft,   (  )nontender, nondistended,    Extremities:    (  ) peripheral edema  Vascular: 2+ peripheral pulses  Neurological:    ( x   )A/O x 3,   (  ) focal deficits  Musculoskeletal:    ( x  )  normal strength b/l upper  (     ) normal  lower extremities  Skin: No rashes          MEDICATIONS:  MEDICATIONS  (STANDING):  acetaminophen  IVPB .. 1000 milliGRAM(s) IV Intermittent once  aspirin enteric coated 81 milliGRAM(s) Oral daily  atorvastatin 40 milliGRAM(s) Oral at bedtime  buMETAnide 3 milliGRAM(s) Oral every 12 hours  chlorhexidine 2% Cloths 1 Application(s) Topical daily  clopidogrel Tablet 75 milliGRAM(s) Oral daily  dextrose 5%. 1000 milliLiter(s) (50 mL/Hr) IV Continuous <Continuous>  dextrose 50% Injectable 12.5 Gram(s) IV Push once  dextrose 50% Injectable 25 Gram(s) IV Push once  dextrose 50% Injectable 25 Gram(s) IV Push once  docusate sodium 100 milliGRAM(s) Oral two times a day  heparin  Injectable 5000 Unit(s) SubCutaneous every 12 hours  hydrALAZINE 50 milliGRAM(s) Oral every 8 hours  insulin glargine Injectable (LANTUS) 66 Unit(s) SubCutaneous at bedtime  insulin glargine Injectable (LANTUS) 52 Unit(s) SubCutaneous every morning  insulin lispro (HumaLOG) corrective regimen sliding scale   SubCutaneous three times a day before meals  insulin lispro (HumaLOG) corrective regimen sliding scale   SubCutaneous at bedtime  insulin lispro Injectable (HumaLOG) 44 Unit(s) SubCutaneous before breakfast  insulin lispro Injectable (HumaLOG) 44 Unit(s) SubCutaneous before lunch  insulin lispro Injectable (HumaLOG) 50 Unit(s) SubCutaneous before dinner  isosorbide   dinitrate Tablet (ISORDIL) 30 milliGRAM(s) Oral three times a day  levothyroxine 50 MICROGram(s) Oral daily  lidocaine   Patch 1 Patch Transdermal daily  melatonin 3 milliGRAM(s) Oral at bedtime  metoprolol succinate ER 25 milliGRAM(s) Oral daily  montelukast 10 milliGRAM(s) Oral daily  Nephro-enrico 1 Tablet(s) Oral daily  pantoprazole    Tablet 40 milliGRAM(s) Oral before breakfast  polyethylene glycol 3350 17 Gram(s) Oral daily  senna 2 Tablet(s) Oral at bedtime  spironolactone 25 milliGRAM(s) Oral daily      LABS: All Labs Reviewed:                        9.4    10.21 )-----------( 352      ( 18 Aug 2019 08:21 )             29.1     08-18    137  |  99  |  74<H>  ----------------------------<  290<H>                                              137  |  102  |  73<H>  ----3.7   |  19<L>  |  1.92<H>------------------------                         3.6   |  19<L>  |  1.86<H>                       <  238<H>          Ca    9.9      18 Aug 2019 06:00            Blood Culture:   Urine Culture      RADIOLOGY/EKG:    ASSESSMENT AND PLAN:  74 yo woman with PMHx of HTN, T2DM (A1c 7.4%), CAD s/p CABG (LIMA to LAD, SVG to OM, SVG to PDA 2014 at [facility]), non-dilated ICM (EF 20-25%), severe mitral regurgitation s/p mitral clip (6/13/19 Dr. Newman), severe pulm HTN, CKD, hypothyroidism, who is presenting to ED after experiencing worsening SO[facility] and [facility]. Of note, pt was recently discharged on 6/19 after having mitral clip procedure completed. She initially required BiPAP with improvement in her respiratory status following diuresis. Initiated on vasopressors and inotropes in CCU, which were subsequently weaned off. Infectious work up was negative.    #HFrEF likely 2/2 ICM with MR s/p alonso clip  -  continue with Bumex 3 mg twice daily   discuss with cardiology      -    - continue hydralazine 50 mg TID and isordil 30 TID  - continue spironolactone 25 mg  - continue Metoprolol Succinate 25 mg PO Daily   - No further cardiac testing at this time.      #CAD  - continue ASA and statin   - Pt with chest pain, but also with end stage cardiomyopathy, coronary disease, had MitraClip and no further intervention feasible. She should not have trops checked.  #elevated cortisol discussed with the Endo for suppression test tonight 1 mg cortisol will be given at 11 AM  #SVT - likely Atrial Tach vs. Atrial Flutter   - Amio initially not given due to elevated LFTs likely in the setting of congestion.   - recheck LFTs.   - Monitor on tele.   - continue metoprolol at current dose as above  - If pt has SVT again can consider amio   #diabetes on  66 units Lantus at bedtime and 52 unit  Lantus in the morning  continue regular insulin 3 times a day  before meal.  Discussed with Endo on 8/16/2019 hopefully we will can increase her Lantus twice a day and only give her 1 time regular insulin  Before lunch if possible to prevent her from hypoglycemia at home.  DVT PPX:    ADVANCED DIRECTIVE:    DISPOSITION:

## 2019-08-19 LAB
ANION GAP SERPL CALC-SCNC: 14 MMOL/L — SIGNIFICANT CHANGE UP (ref 5–17)
BUN SERPL-MCNC: 75 MG/DL — HIGH (ref 7–23)
CALCIUM SERPL-MCNC: 10.1 MG/DL — SIGNIFICANT CHANGE UP (ref 8.4–10.5)
CHLORIDE SERPL-SCNC: 101 MMOL/L — SIGNIFICANT CHANGE UP (ref 96–108)
CO2 SERPL-SCNC: 18 MMOL/L — LOW (ref 22–31)
CORTIS AM PEAK SERPL-MCNC: 3.3 UG/DL — LOW (ref 6–18.4)
CREAT SERPL-MCNC: 2.03 MG/DL — HIGH (ref 0.5–1.3)
GLUCOSE BLDC GLUCOMTR-MCNC: 295 MG/DL — HIGH (ref 70–99)
GLUCOSE BLDC GLUCOMTR-MCNC: 300 MG/DL — HIGH (ref 70–99)
GLUCOSE BLDC GLUCOMTR-MCNC: 334 MG/DL — HIGH (ref 70–99)
GLUCOSE BLDC GLUCOMTR-MCNC: 343 MG/DL — HIGH (ref 70–99)
GLUCOSE BLDC GLUCOMTR-MCNC: 360 MG/DL — HIGH (ref 70–99)
GLUCOSE SERPL-MCNC: 318 MG/DL — HIGH (ref 70–99)
HCT VFR BLD CALC: 28.4 % — LOW (ref 34.5–45)
HGB BLD-MCNC: 9.1 G/DL — LOW (ref 11.5–15.5)
MCHC RBC-ENTMCNC: 28.3 PG — SIGNIFICANT CHANGE UP (ref 27–34)
MCHC RBC-ENTMCNC: 32 GM/DL — SIGNIFICANT CHANGE UP (ref 32–36)
MCV RBC AUTO: 88.2 FL — SIGNIFICANT CHANGE UP (ref 80–100)
PLATELET # BLD AUTO: 334 K/UL — SIGNIFICANT CHANGE UP (ref 150–400)
POTASSIUM SERPL-MCNC: 4.2 MMOL/L — SIGNIFICANT CHANGE UP (ref 3.5–5.3)
POTASSIUM SERPL-SCNC: 4.2 MMOL/L — SIGNIFICANT CHANGE UP (ref 3.5–5.3)
RBC # BLD: 3.22 M/UL — LOW (ref 3.8–5.2)
RBC # FLD: 16 % — HIGH (ref 10.3–14.5)
SODIUM SERPL-SCNC: 133 MMOL/L — LOW (ref 135–145)
URATE SERPL-MCNC: 13.3 MG/DL — HIGH (ref 2.5–7)
WBC # BLD: 10.23 K/UL — SIGNIFICANT CHANGE UP (ref 3.8–10.5)
WBC # FLD AUTO: 10.23 K/UL — SIGNIFICANT CHANGE UP (ref 3.8–10.5)

## 2019-08-19 RX ORDER — INSULIN LISPRO 100/ML
VIAL (ML) SUBCUTANEOUS AT BEDTIME
Refills: 0 | Status: DISCONTINUED | OUTPATIENT
Start: 2019-08-19 | End: 2019-08-20

## 2019-08-19 RX ORDER — INSULIN NPH HUM/REG INSULIN HM 70-30/ML
45 VIAL (ML) SUBCUTANEOUS
Refills: 0 | Status: DISCONTINUED | OUTPATIENT
Start: 2019-08-19 | End: 2019-08-20

## 2019-08-19 RX ORDER — INSULIN LISPRO 100/ML
VIAL (ML) SUBCUTANEOUS
Refills: 0 | Status: DISCONTINUED | OUTPATIENT
Start: 2019-08-19 | End: 2019-08-20

## 2019-08-19 RX ORDER — INSULIN NPH HUM/REG INSULIN HM 70-30/ML
55 VIAL (ML) SUBCUTANEOUS
Refills: 0 | Status: DISCONTINUED | OUTPATIENT
Start: 2019-08-19 | End: 2019-08-20

## 2019-08-19 RX ADMIN — Medication 50 MILLIGRAM(S): at 13:34

## 2019-08-19 RX ADMIN — LIDOCAINE 1 PATCH: 4 CREAM TOPICAL at 09:00

## 2019-08-19 RX ADMIN — Medication 44 UNIT(S): at 12:18

## 2019-08-19 RX ADMIN — Medication 1 TABLET(S): at 12:18

## 2019-08-19 RX ADMIN — Medication 44 UNIT(S): at 07:54

## 2019-08-19 RX ADMIN — Medication 4: at 22:47

## 2019-08-19 RX ADMIN — Medication 0.25 MILLIGRAM(S): at 07:59

## 2019-08-19 RX ADMIN — Medication 3: at 07:53

## 2019-08-19 RX ADMIN — ATORVASTATIN CALCIUM 40 MILLIGRAM(S): 80 TABLET, FILM COATED ORAL at 22:04

## 2019-08-19 RX ADMIN — Medication 6: at 16:50

## 2019-08-19 RX ADMIN — TRAMADOL HYDROCHLORIDE 25 MILLIGRAM(S): 50 TABLET ORAL at 09:00

## 2019-08-19 RX ADMIN — TRAMADOL HYDROCHLORIDE 25 MILLIGRAM(S): 50 TABLET ORAL at 14:00

## 2019-08-19 RX ADMIN — Medication 4: at 12:18

## 2019-08-19 RX ADMIN — Medication 0.25 MILLIGRAM(S): at 13:33

## 2019-08-19 RX ADMIN — BUMETANIDE 3 MILLIGRAM(S): 0.25 INJECTION INTRAMUSCULAR; INTRAVENOUS at 17:31

## 2019-08-19 RX ADMIN — Medication 100 MILLIGRAM(S): at 17:31

## 2019-08-19 RX ADMIN — LIDOCAINE 1 PATCH: 4 CREAM TOPICAL at 07:56

## 2019-08-19 RX ADMIN — INSULIN GLARGINE 52 UNIT(S): 100 INJECTION, SOLUTION SUBCUTANEOUS at 07:53

## 2019-08-19 RX ADMIN — Medication 50 MILLIGRAM(S): at 05:20

## 2019-08-19 RX ADMIN — TRAMADOL HYDROCHLORIDE 25 MILLIGRAM(S): 50 TABLET ORAL at 13:33

## 2019-08-19 RX ADMIN — Medication 81 MILLIGRAM(S): at 12:18

## 2019-08-19 RX ADMIN — Medication 50 MILLIGRAM(S): at 22:05

## 2019-08-19 RX ADMIN — CLOPIDOGREL BISULFATE 75 MILLIGRAM(S): 75 TABLET, FILM COATED ORAL at 12:18

## 2019-08-19 RX ADMIN — Medication 100 MILLIGRAM(S): at 05:21

## 2019-08-19 RX ADMIN — HEPARIN SODIUM 5000 UNIT(S): 5000 INJECTION INTRAVENOUS; SUBCUTANEOUS at 05:21

## 2019-08-19 RX ADMIN — ISOSORBIDE DINITRATE 30 MILLIGRAM(S): 5 TABLET ORAL at 22:04

## 2019-08-19 RX ADMIN — Medication 50 MICROGRAM(S): at 05:20

## 2019-08-19 RX ADMIN — HEPARIN SODIUM 5000 UNIT(S): 5000 INJECTION INTRAVENOUS; SUBCUTANEOUS at 17:31

## 2019-08-19 RX ADMIN — MONTELUKAST 10 MILLIGRAM(S): 4 TABLET, CHEWABLE ORAL at 12:18

## 2019-08-19 RX ADMIN — CHLORHEXIDINE GLUCONATE 1 APPLICATION(S): 213 SOLUTION TOPICAL at 22:05

## 2019-08-19 RX ADMIN — ISOSORBIDE DINITRATE 30 MILLIGRAM(S): 5 TABLET ORAL at 13:34

## 2019-08-19 RX ADMIN — POLYETHYLENE GLYCOL 3350 17 GRAM(S): 17 POWDER, FOR SOLUTION ORAL at 12:18

## 2019-08-19 RX ADMIN — LIDOCAINE 1 PATCH: 4 CREAM TOPICAL at 22:04

## 2019-08-19 RX ADMIN — PANTOPRAZOLE SODIUM 40 MILLIGRAM(S): 20 TABLET, DELAYED RELEASE ORAL at 05:20

## 2019-08-19 RX ADMIN — TRAMADOL HYDROCHLORIDE 25 MILLIGRAM(S): 50 TABLET ORAL at 08:00

## 2019-08-19 RX ADMIN — Medication 45 UNIT(S): at 16:49

## 2019-08-19 RX ADMIN — ISOSORBIDE DINITRATE 30 MILLIGRAM(S): 5 TABLET ORAL at 05:20

## 2019-08-19 RX ADMIN — Medication 25 MILLIGRAM(S): at 05:20

## 2019-08-19 RX ADMIN — Medication 3 MILLIGRAM(S): at 22:04

## 2019-08-19 RX ADMIN — SPIRONOLACTONE 25 MILLIGRAM(S): 25 TABLET, FILM COATED ORAL at 05:20

## 2019-08-19 RX ADMIN — BUMETANIDE 3 MILLIGRAM(S): 0.25 INJECTION INTRAMUSCULAR; INTRAVENOUS at 05:20

## 2019-08-19 NOTE — CHART NOTE - NSCHARTNOTEFT_GEN_A_CORE
Nutrition Follow Up Note  Patient seen for: malnutrition follow up on 6TOW.    Chart reviewed, events noted.   Pt is 72 yo F with PMHx of HTN, T2DM (last HgbA1c 7.4%), CAD s/p CABG,  non-dilated ICM (EF 20-25%), severe mitral regurgitation s/p mitral clip, severe pulm HTN, CKD, hypothyroidism, presented to ED after [facility]. +acute on chronic HF, euvolemic currently. MERVIN on CKD, renal function fluctuates, nephrology following. Pt POCT elevated still at times, endocrinology following. End stage cardiomyopathy, no further interventions per chart. Palliative following.    Source: electronic medical record, pt (Chinese and English speaking-able to make needs known and answer RD questions appropriately)    Diet : Consistent Carbohydrate (with evening snacks) + Low Fiber + Low Sodium +1500ml Fluid Restriction + Halal + Glucerna 1x/daily (provides additional 220kcal, 10g pro)     Patient reports: No acute GI distress reported. Last BM .    PO intake : pt with variable po intake noted 35-45% since last follow up. Per RN, pt reportedly eating well overall? discrepancy between po documentation and RN report noted. Pt consumes food from home at times. Continue to provide 1 Sugar Free Health Shake daily.  Pt unable to participate in extensive verbal nutrition education in English and too weak too utilize translation phone at this time. RD remains available, no family at bedside at this time.     Source for PO intake: chart, pt     Enteral /Parenteral Nutrition: n/a    Daily Weight in k.2 (), pt wt now trending up again, no fluid retention noted. Will continue to monitor  Weight in k.2 (-)    *: Nutrition Focused Physical Exam performed with pt's verbal consent with the following findings: no overt signs of muscle wasting to temporal, clavicle, acromion process, however noted with moderate muscle wasting to calves. No overt signs of fat loss to orbital fat pads or triceps.  48.5 (-14) -> indicates 4.4 kG (8.3%) wt loss overall x ~1 month, appears most of which occurred within the past 10 days, ~3.7 kG (7.1%) lost since .   51.1 (-12), 49 (), 51.5 (08-10), 52 (), 52.2 (), 51.7 (), 52.7 (), 52.4 (), 52.3 (), 52.9 ()    Pt height noted as 51", question accuracy. This height would indicate BMI 28.8. Per chart, previous height noted as 59" on 2019 admit and would indicate BMI 21.5 and is likely more accurate (based on wt 48.6 kG).    Pertinent Medications: MEDICATIONS  (STANDING):  acetaminophen  IVPB .. 1000 milliGRAM(s) IV Intermittent once  aspirin enteric coated 81 milliGRAM(s) Oral daily  atorvastatin 40 milliGRAM(s) Oral at bedtime  buMETAnide 3 milliGRAM(s) Oral every 12 hours  chlorhexidine 2% Cloths 1 Application(s) Topical daily  clopidogrel Tablet 75 milliGRAM(s) Oral daily  dextrose 5%. 1000 milliLiter(s) (50 mL/Hr) IV Continuous <Continuous>  dextrose 50% Injectable 12.5 Gram(s) IV Push once  dextrose 50% Injectable 25 Gram(s) IV Push once  dextrose 50% Injectable 25 Gram(s) IV Push once  docusate sodium 100 milliGRAM(s) Oral two times a day  heparin  Injectable 5000 Unit(s) SubCutaneous every 12 hours  hydrALAZINE 50 milliGRAM(s) Oral every 8 hours  insulin glargine Injectable (LANTUS) 66 Unit(s) SubCutaneous at bedtime  insulin glargine Injectable (LANTUS) 52 Unit(s) SubCutaneous every morning  insulin lispro (HumaLOG) corrective regimen sliding scale   SubCutaneous three times a day before meals  insulin lispro (HumaLOG) corrective regimen sliding scale   SubCutaneous at bedtime  insulin lispro Injectable (HumaLOG) 44 Unit(s) SubCutaneous before breakfast  insulin lispro Injectable (HumaLOG) 44 Unit(s) SubCutaneous before lunch  insulin lispro Injectable (HumaLOG) 50 Unit(s) SubCutaneous before dinner  isosorbide   dinitrate Tablet (ISORDIL) 30 milliGRAM(s) Oral three times a day  levothyroxine 50 MICROGram(s) Oral daily  lidocaine   Patch 1 Patch Transdermal daily  melatonin 3 milliGRAM(s) Oral at bedtime  metoprolol succinate ER 25 milliGRAM(s) Oral daily  montelukast 10 milliGRAM(s) Oral daily  Nephro-enrico 1 Tablet(s) Oral daily  pantoprazole    Tablet 40 milliGRAM(s) Oral before breakfast  polyethylene glycol 3350 17 Gram(s) Oral daily  senna 2 Tablet(s) Oral at bedtime  spironolactone 25 milliGRAM(s) Oral daily    MEDICATIONS  (PRN):  acetaminophen   Tablet .. 650 milliGRAM(s) Oral every 6 hours PRN Mild Pain (1 - 3), Moderate Pain (4 - 6)  ALPRAZolam 0.25 milliGRAM(s) Oral three times a day PRN anxiety  dextrose 40% Gel 15 Gram(s) Oral once PRN Blood Glucose LESS THAN 70 milliGRAM(s)/deciliter  glucagon  Injectable 1 milliGRAM(s) IntraMuscular once PRN Glucose LESS THAN 70 milligrams/deciliter  traMADol 25 milliGRAM(s) Oral every 6 hours PRN Moderate Pain (4 - 6)    Pertinent Labs:  @ 05:57: Na 133<L>, BUN 75<H>, Cr 2.03<H>, <H>, K+ 4.2, Phos --, Mg --, Alk Phos --, ALT/SGPT --, AST/SGOT --, HbA1c --    Finger Sticks:  POCT Blood Glucose.: 300 mg/dL ( @ 07:40)  POCT Blood Glucose.: 320 mg/dL ( @ 21:30)  POCT Blood Glucose.: 322 mg/dL ( @ 16:13)  POCT Blood Glucose.: 322 mg/dL ( @ 11:52)      Skin per nursing documentation: no pressure injuries noted at this time  Edema: none noted    Estimated Needs:   [x] no change since previous assessment  [ ] recalculated:     Previous Nutrition Diagnosis: Predicted suboptimal energy intake -> Malnutrition, moderate (acute on chronic)  Nutrition Diagnosis is: ongoing, being addressed with oral nutrition supplement, micronutrient supplementation; recommend liberalize diet. Pt medical condition precludes aggressive nutrition interventions at this time.    New Nutrition Diagnosis: n/a    Recommend  1) Continue to recommend liberalizing diet to Consistent Carbohydrate (with evening snacks) + Low Sodium + Halal. Fluids deferred to team. Promote adequate BG control, recommend adjust insulin as needed.  2) Recommend discontinue glucerna supplement as pt states she is not drinking it. Continue to provide SF Health Shake 1x/daily.  3) Continue current renal multivitamin as indicated.  4) Provide nutrition education to pt family members/care givers as feasible for promoting adequate BG control and promoting adequate protein-energy intake.    Monitoring and Evaluation:     Continue to monitor Nutritional intake, Tolerance to diet prescription, weights, labs, skin integrity    RD remains available upon request and will follow up per protocol. Janneth Wang RD, CDN Pager: [phone].

## 2019-08-19 NOTE — PROGRESS NOTE ADULT - SUBJECTIVE AND OBJECTIVE BOX
CHIEF COMPLAINT:    SUBJECTIVE:     REVIEW OF SYSTEMS:    CONSTITUTIONAL: (  )  weakness,  (  ) fevers or chills  EYES/ENT: (  )visual changes;     NECK: (  ) pain or stiffness  RESPIRATORY:   (  )cough, wheezing, hemoptysis;  (  ) shortness of breath  CARDIOVASCULAR:  (  )chest pain or palpitations  GASTROINTESTINAL:   (  )abdominal or epigastric pain.  (  ) nausea, vomiting, or hematemesis;   (   ) diarrhea or constipation.   GENITOURINARY:   (    ) dysuria, frequency or hematuria  NEUROLOGICAL:  (   ) numbness or weakness   All other review of systems is negative unless indicated above    Vital Signs Last 24 Hrs  T(C): 36.7 (19 Aug 2019 04:25), Max: 36.9 (18 Aug 2019 19:29)  T(F): 98.1 (19 Aug 2019 04:25), Max: 98.4 (18 Aug 2019 19:29)  HR: 84 (19 Aug 2019 04:25) (84 - 96)  BP: 106/62 (19 Aug 2019 04:25) (104/61 - 129/68)  BP(mean): --  RR: 18 (19 Aug 2019 04:25) (18 - 18)  SpO2: 97% (19 Aug 2019 04:25) (97% - 98%)    I&O's Summary    18 Aug 2019 07:01  -  19 Aug 2019 07:00  --------------------------------------------------------  IN: 590 mL / OUT: 1125 mL / NET: -535 mL        CAPILLARY BLOOD GLUCOSE      POCT Blood Glucose.: 300 mg/dL (19 Aug 2019 07:40)  POCT Blood Glucose.: 320 mg/dL (18 Aug 2019 21:30)  POCT Blood Glucose.: 322 mg/dL (18 Aug 2019 16:13)  POCT Blood Glucose.: 322 mg/dL (18 Aug 2019 11:52)      PHYSICAL EXAM:    Constitutional:  (   ) NAD,   (   )awake and alert  HEENT: PERR, EOMI,    Neck: Soft and supple, No LAD, No JVD  Respiratory:  (    Breath sounds are clear bilaterally,    (   ) wheezing, rales or rhonchi  Cardiovascular:     (   )S1 and S2, regular rate and rhythm, no Murmurs, gallops or rubs  Gastrointestinal:  (   )Bowel Sounds present, soft,   (  )nontender, nondistended,    Extremities:    (  ) peripheral edema  Vascular: 2+ peripheral pulses  Neurological:    (    )A/O x 3,   (  ) focal deficits  Musculoskeletal:    (   )  normal strength b/l upper  (     ) normal  lower extremities  Skin: No rashes    MEDICATIONS:  MEDICATIONS  (STANDING):  acetaminophen  IVPB .. 1000 milliGRAM(s) IV Intermittent once  aspirin enteric coated 81 milliGRAM(s) Oral daily  atorvastatin 40 milliGRAM(s) Oral at bedtime  buMETAnide 3 milliGRAM(s) Oral every 12 hours  chlorhexidine 2% Cloths 1 Application(s) Topical daily  clopidogrel Tablet 75 milliGRAM(s) Oral daily  dextrose 5%. 1000 milliLiter(s) (50 mL/Hr) IV Continuous <Continuous>  dextrose 50% Injectable 12.5 Gram(s) IV Push once  dextrose 50% Injectable 25 Gram(s) IV Push once  dextrose 50% Injectable 25 Gram(s) IV Push once  docusate sodium 100 milliGRAM(s) Oral two times a day  heparin  Injectable 5000 Unit(s) SubCutaneous every 12 hours  hydrALAZINE 50 milliGRAM(s) Oral every 8 hours  insulin glargine Injectable (LANTUS) 66 Unit(s) SubCutaneous at bedtime  insulin glargine Injectable (LANTUS) 52 Unit(s) SubCutaneous every morning  insulin lispro (HumaLOG) corrective regimen sliding scale   SubCutaneous three times a day before meals  insulin lispro (HumaLOG) corrective regimen sliding scale   SubCutaneous at bedtime  insulin lispro Injectable (HumaLOG) 44 Unit(s) SubCutaneous before breakfast  insulin lispro Injectable (HumaLOG) 44 Unit(s) SubCutaneous before lunch  insulin lispro Injectable (HumaLOG) 50 Unit(s) SubCutaneous before dinner  isosorbide   dinitrate Tablet (ISORDIL) 30 milliGRAM(s) Oral three times a day  levothyroxine 50 MICROGram(s) Oral daily  lidocaine   Patch 1 Patch Transdermal daily  melatonin 3 milliGRAM(s) Oral at bedtime  metoprolol succinate ER 25 milliGRAM(s) Oral daily  montelukast 10 milliGRAM(s) Oral daily  Nephro-enrico 1 Tablet(s) Oral daily  pantoprazole    Tablet 40 milliGRAM(s) Oral before breakfast  polyethylene glycol 3350 17 Gram(s) Oral daily  senna 2 Tablet(s) Oral at bedtime  spironolactone 25 milliGRAM(s) Oral daily      LABS: All Labs Reviewed:                        9.4    10.21 )-----------( 352      ( 18 Aug 2019 08:21 )             29.1     08-19    133<L>  |  101  |  75<H>  ----------------------------<  318<H>  4.2   |  18<L>  |  2.03<H>    Ca    10.1      19 Aug 2019 05:57            Blood Culture:   Urine Culture      RADIOLOGY/EKG:    ASSESSMENT AND PLAN:    DVT PPX:    ADVANCED DIRECTIVE:    DISPOSITION: CHIEF COMPLAINT:  patient awake verbal laying in the bed without S OB but is still complaining of weakness her blood sugar still elevated around 300 in spite of all increasing the Lantus  SUBJECTIVE:     REVIEW OF SYSTEMS:    CONSTITUTIONAL: ( x )  weakness,  (  ) fevers or chills  EYES/ENT: (  )visual changes;     NECK: (  ) pain or stiffness  RESPIRATORY:   (  )cough, wheezing, hemoptysis;  (  ) shortness of breath  CARDIOVASCULAR:  (  )chest pain or palpitations  GASTROINTESTINAL:   (  )abdominal or epigastric pain.  (  ) nausea, vomiting, or hematemesis;   (   ) diarrhea or constipation.   GENITOURINARY:   (    ) dysuria, frequency or hematuria  NEUROLOGICAL:  (   ) numbness or weakness   All other review of systems is negative unless indicated above    Vital Signs Last 24 Hrs  T(C): 36.7 (19 Aug 2019 04:25), Max: 36.9 (18 Aug 2019 19:29)  T(F): 98.1 (19 Aug 2019 04:25), Max: 98.4 (18 Aug 2019 19:29)  HR: 84 (19 Aug 2019 04:25) (84 - 96)  BP: 106/62 (19 Aug 2019 04:25) (104/61 - 129/68)  BP(mean): --  RR: 18 (19 Aug 2019 04:25) (18 - 18)  SpO2: 97% (19 Aug 2019 04:25) (97% - 98%)    I&O's Summary    18 Aug 2019 07:01  -  19 Aug 2019 07:00  --------------------------------------------------------  IN: 590 mL / OUT: 1125 mL / NET: -535 mL        CAPILLARY BLOOD GLUCOSE      POCT Blood Glucose.: 300 mg/dL (19 Aug 2019 07:40)  POCT Blood Glucose.: 320 mg/dL (18 Aug 2019 21:30)  POCT Blood Glucose.: 322 mg/dL (18 Aug 2019 16:13)  POCT Blood Glucose.: 322 mg/dL (18 Aug 2019 11:52)      PHYSICAL EXAM:    Constitutional:  ( x  ) NAD,   (  x )awake and alert  HEENT: PERR, EOMI,    Neck: Soft and supple, No LAD, No JVD  Respiratory:  (  x  Breath sounds are clear bilaterally,    (   ) wheezing, rales or rhonchi  Cardiovascular:     ( x  )S1 and S2, regular rate and rhythm, no Murmurs, gallops or rubs  Gastrointestinal:  (   )Bowel Sounds present, soft,   (  )nontender, nondistended,    Extremities:    (  ) peripheral edema  Vascular: 2+ peripheral pulses  Neurological:    (    )A/O x 3,   (  ) focal deficits  Musculoskeletal:    ( x  )  normal strength b/l upper  (     ) normal  lower extremities  Skin: No rashes    MEDICATIONS:  MEDICATIONS  (STANDING):  acetaminophen  IVPB .. 1000 milliGRAM(s) IV Intermittent once  aspirin enteric coated 81 milliGRAM(s) Oral daily  atorvastatin 40 milliGRAM(s) Oral at bedtime  buMETAnide 3 milliGRAM(s) Oral every 12 hours  chlorhexidine 2% Cloths 1 Application(s) Topical daily  clopidogrel Tablet 75 milliGRAM(s) Oral daily  dextrose 5%. 1000 milliLiter(s) (50 mL/Hr) IV Continuous <Continuous>  dextrose 50% Injectable 12.5 Gram(s) IV Push once  dextrose 50% Injectable 25 Gram(s) IV Push once  dextrose 50% Injectable 25 Gram(s) IV Push once  docusate sodium 100 milliGRAM(s) Oral two times a day  heparin  Injectable 5000 Unit(s) SubCutaneous every 12 hours  hydrALAZINE 50 milliGRAM(s) Oral every 8 hours  insulin glargine Injectable (LANTUS) 66 Unit(s) SubCutaneous at bedtime  insulin glargine Injectable (LANTUS) 52 Unit(s) SubCutaneous every morning  insulin lispro (HumaLOG) corrective regimen sliding scale   SubCutaneous three times a day before meals  insulin lispro (HumaLOG) corrective regimen sliding scale   SubCutaneous at bedtime  insulin lispro Injectable (HumaLOG) 44 Unit(s) SubCutaneous before breakfast  insulin lispro Injectable (HumaLOG) 44 Unit(s) SubCutaneous before lunch  insulin lispro Injectable (HumaLOG) 50 Unit(s) SubCutaneous before dinner  isosorbide   dinitrate Tablet (ISORDIL) 30 milliGRAM(s) Oral three times a day  levothyroxine 50 MICROGram(s) Oral daily  lidocaine   Patch 1 Patch Transdermal daily  melatonin 3 milliGRAM(s) Oral at bedtime  metoprolol succinate ER 25 milliGRAM(s) Oral daily  montelukast 10 milliGRAM(s) Oral daily  Nephro-enrico 1 Tablet(s) Oral daily  pantoprazole    Tablet 40 milliGRAM(s) Oral before breakfast  polyethylene glycol 3350 17 Gram(s) Oral daily  senna 2 Tablet(s) Oral at bedtime  spironolactone 25 milliGRAM(s) Oral daily      LABS: All Labs Reviewed:                        9.4    10.21 )-----------( 352      ( 18 Aug 2019 08:21 )             29.1     08-19    133<L>  |  101  |  75<H>  ----------------------------<  318<H>  4.2   |  18<L>  |  2.03<H>    Ca    10.1      19 Aug 2019 05:57            Blood Culture:   Urine Culture      RADIOLOGY/EKG:    ASSESSMENT AND PLAN:  72 yo woman with PMHx of HTN, T2DM (A1c 7.4%), CAD s/p CABG (LIMA to LAD, SVG to OM, SVG to PDA 2014 at [facility]), non-dilated ICM (EF 20-25%), severe mitral regurgitation s/p mitral clip (6/13/19 Dr. Newman), severe pulm HTN, CKD, hypothyroidism, who is presenting to ED after experiencing worsening SOB and intermittent chest pain for 1 week at [facility]. Of note, pt was recently discharged on 6/19 after having mitral clip procedure completed. She initially required BiPAP with improvement in her respiratory status following diuresis. Initiated on vasopressors and inotropes in CCU, which were subsequently weaned off. Infectious work up was negative.    #HFrEF likely 2/2 ICM with MR s/p alonso clip  -  continue with Bumex 3 mg twice daily   discuss with cardiology      -    - continue hydralazine 50 mg TID and isordil 30 TID  - continue spironolactone 25 mg  - continue Metoprolol Succinate 25 mg PO Daily   - No further cardiac testing at this time.      #CAD  - continue ASA and statin   - Pt with chest pain, but also with end stage cardiomyopathy, coronary disease, had MitraClip and no further intervention feasible. She should not have trops checked.  #elevated cortisol discussed with the Endo for suppression test tonight 1 mg cortisol will be given at 11 AM  #SVT - likely Atrial Tach vs. Atrial Flutter   - Amio initially not given due to elevated LFTs likely in the setting of congestion.   - recheck LFTs.   - Monitor on tele.   - continue metoprolol at current dose as above  - If pt has SVT again can consider amio   #diabetes on  66 units Lantus at bedtime and 52 unit  Lantus in the morning  which is not effective  Discussed with Endo on 8/18/2019  to change her insulin regimen Cushing's syndrome was ruled out      DVT PPX:    ADVANCED DIRECTIVE:    DISPOSITION:

## 2019-08-19 NOTE — CHART NOTE - NSCHARTNOTEFT_GEN_A_CORE
Pt with c/o R thumb and index finger pain.  Noted to have pain and some swelling.  Uric acid level ordered.  Will follow up.      [id number]

## 2019-08-19 NOTE — PROGRESS NOTE ADULT - SUBJECTIVE AND OBJECTIVE BOX
[facility]   MD GONZALEZ SHAH D.O, PA    TELEPHONE NUMBERS:  OFFICE: [phone]  DR. SANTOYO CELL: [phone]  JUSTEN FIELD CELL: [phone]  DR. RIDER CELL:  [phone]    RENAL FOLLOW UP NOTE  --------------------------------------------------------------------------------  HPI: Pt seen and examined. Pt out of bed to chair. Pt has finger pain.   Denies SOB, chest pain     PAST HISTORY  --------------------------------------------------------------------------------  No significant changes to PMH, PSH, FHx, SHx, unless otherwise noted    ALLERGIES & MEDICATIONS  --------------------------------------------------------------------------------  Allergies    azithromycin (Hives; Pruritus)    Intolerances      Standing Inpatient Medications  acetaminophen  IVPB .. 1000 milliGRAM(s) IV Intermittent once  aspirin enteric coated 81 milliGRAM(s) Oral daily  atorvastatin 40 milliGRAM(s) Oral at bedtime  buMETAnide 3 milliGRAM(s) Oral every 12 hours  chlorhexidine 2% Cloths 1 Application(s) Topical daily  clopidogrel Tablet 75 milliGRAM(s) Oral daily  dextrose 5%. 1000 milliLiter(s) IV Continuous <Continuous>  dextrose 50% Injectable 12.5 Gram(s) IV Push once  dextrose 50% Injectable 25 Gram(s) IV Push once  dextrose 50% Injectable 25 Gram(s) IV Push once  docusate sodium 100 milliGRAM(s) Oral two times a day  heparin  Injectable 5000 Unit(s) SubCutaneous every 12 hours  hydrALAZINE 50 milliGRAM(s) Oral every 8 hours  insulin glargine Injectable (LANTUS) 66 Unit(s) SubCutaneous at bedtime  insulin glargine Injectable (LANTUS) 52 Unit(s) SubCutaneous every morning  insulin lispro (HumaLOG) corrective regimen sliding scale   SubCutaneous three times a day before meals  insulin lispro (HumaLOG) corrective regimen sliding scale   SubCutaneous at bedtime  insulin lispro Injectable (HumaLOG) 44 Unit(s) SubCutaneous before breakfast  insulin lispro Injectable (HumaLOG) 44 Unit(s) SubCutaneous before lunch  insulin lispro Injectable (HumaLOG) 50 Unit(s) SubCutaneous before dinner  isosorbide   dinitrate Tablet (ISORDIL) 30 milliGRAM(s) Oral three times a day  levothyroxine 50 MICROGram(s) Oral daily  lidocaine   Patch 1 Patch Transdermal daily  melatonin 3 milliGRAM(s) Oral at bedtime  metoprolol succinate ER 25 milliGRAM(s) Oral daily  montelukast 10 milliGRAM(s) Oral daily  Nephro-enrico 1 Tablet(s) Oral daily  pantoprazole    Tablet 40 milliGRAM(s) Oral before breakfast  polyethylene glycol 3350 17 Gram(s) Oral daily  senna 2 Tablet(s) Oral at bedtime  spironolactone 25 milliGRAM(s) Oral daily    PRN Inpatient Medications  acetaminophen   Tablet .. 650 milliGRAM(s) Oral every 6 hours PRN  ALPRAZolam 0.25 milliGRAM(s) Oral three times a day PRN  dextrose 40% Gel 15 Gram(s) Oral once PRN  glucagon  Injectable 1 milliGRAM(s) IntraMuscular once PRN  traMADol 25 milliGRAM(s) Oral every 6 hours PRN      REVIEW OF SYSTEMS  --------------------------------------------------------------------------------  General: no fever  CVS: no chest pain  RESP: no sob, no cough  ABD: no abdominal pain  : no dysuria,  MSK: no edema     VITALS/PHYSICAL EXAM  --------------------------------------------------------------------------------  T(C): 36.7 (08-19-19 @ 04:25), Max: 36.9 (08-18-19 @ 19:29)  HR: 84 (08-19-19 @ 04:25) (84 - 96)  BP: 106/62 (08-19-19 @ 04:25) (104/61 - 129/68)  RR: 18 (08-19-19 @ 04:25) (18 - 18)  SpO2: 97% (08-19-19 @ 04:25) (97% - 98%)  Wt(kg): --        08-18-19 @ 07:01  -  08-19-19 @ 07:00  --------------------------------------------------------  IN: 590 mL / OUT: 1125 mL / NET: -535 mL    Physical Exam:  	Gen: NAD  	HEENT: MMM  	Pulm: CTA B/L  	CV: S1S2  	Abd: Soft, +BS  	Ext: No LE edema B/L                      Neuro: Awake   	Skin: Warm and Dry     LABS/STUDIES  --------------------------------------------------------------------------------              9.1    10.23 >-----------<  334      [08-19-19 @ 08:08]              28.4     133  |  101  |  75  ----------------------------<  318      [08-19-19 @ 05:57]  4.2   |  18  |  2.03        Ca     10.1     [08-19-19 @ 05:57]    Creatinine Trend:  SCr 2.03 [08-19 @ 05:57]  SCr 1.92 [08-18 @ 06:00]  SCr 1.86 [08-17 @ 05:34]  SCr 1.90 [08-16 @ 06:08]  SCr 2.00 [08-15 @ 06:26]        Iron 42, TIBC 348, %sat 12      [07-05-19 @ 22:32]  Ferritin 130      [07-05-19 @ 22:32]  .4 (Ca --)      [04-25-19 @ 05:45]   --  PTH 43.55 (Ca --)      [09-10-18 @ 07:15]   --  PTH 36.60 (Ca --)      [09-08-18 @ 03:15]   --  Vitamin D (25OH) 25.9      [05-01-19 @ 04:00]  HbA1c 7.4      [07-05-19 @ 22:32]  TSH 2.00      [07-05-19 @ 22:32]  Lipid: chol 148, , HDL 35,       [06-01-19 @ 08:00]

## 2019-08-19 NOTE — PROGRESS NOTE ADULT - SUBJECTIVE AND OBJECTIVE BOX
Chief complaint  Patient is a 73y old  Female who presents with a chief complaint of Hypoxia, SOB (19 Aug 2019 10:30)   Review of systems  Patient in bed, looks comfortable, no fever,  had no hypoglycemia.    Labs and Fingersticks  CAPILLARY BLOOD GLUCOSE      POCT Blood Glucose.: 343 mg/dL (19 Aug 2019 11:45)  POCT Blood Glucose.: 300 mg/dL (19 Aug 2019 07:40)  POCT Blood Glucose.: 320 mg/dL (18 Aug 2019 21:30)  POCT Blood Glucose.: 322 mg/dL (18 Aug 2019 16:13)      Anion Gap, Serum: 14 (08-19 @ 05:57)  Anion Gap, Serum: 19 <H> (08-18 @ 06:00)      Calcium, Total Serum: 10.1 (08-19 @ 05:57)  Calcium, Total Serum: 9.9 (08-18 @ 06:00)          08-19    133<L>  |  101  |  75<H>  ----------------------------<  318<H>  4.2   |  18<L>  |  2.03<H>    Ca    10.1      19 Aug 2019 05:57                          9.1    10.23 )-----------( 334      ( 19 Aug 2019 08:08 )             28.4     Medications  MEDICATIONS  (STANDING):  acetaminophen  IVPB .. 1000 milliGRAM(s) IV Intermittent once  aspirin enteric coated 81 milliGRAM(s) Oral daily  atorvastatin 40 milliGRAM(s) Oral at bedtime  buMETAnide 3 milliGRAM(s) Oral every 12 hours  chlorhexidine 2% Cloths 1 Application(s) Topical daily  clopidogrel Tablet 75 milliGRAM(s) Oral daily  dextrose 5%. 1000 milliLiter(s) (50 mL/Hr) IV Continuous <Continuous>  dextrose 50% Injectable 12.5 Gram(s) IV Push once  dextrose 50% Injectable 25 Gram(s) IV Push once  dextrose 50% Injectable 25 Gram(s) IV Push once  docusate sodium 100 milliGRAM(s) Oral two times a day  heparin  Injectable 5000 Unit(s) SubCutaneous every 12 hours  hydrALAZINE 50 milliGRAM(s) Oral every 8 hours  insulin lispro (HumaLOG) corrective regimen sliding scale   SubCutaneous three times a day before meals  insulin lispro (HumaLOG) corrective regimen sliding scale   SubCutaneous at bedtime  insulin NPH/regular 70/30 Injectable 55 Unit(s) SubCutaneous before breakfast  insulin NPH/regular 70/30 Injectable 45 Unit(s) SubCutaneous before dinner  isosorbide   dinitrate Tablet (ISORDIL) 30 milliGRAM(s) Oral three times a day  levothyroxine 50 MICROGram(s) Oral daily  lidocaine   Patch 1 Patch Transdermal daily  melatonin 3 milliGRAM(s) Oral at bedtime  metoprolol succinate ER 25 milliGRAM(s) Oral daily  montelukast 10 milliGRAM(s) Oral daily  Nephro-enrico 1 Tablet(s) Oral daily  pantoprazole    Tablet 40 milliGRAM(s) Oral before breakfast  polyethylene glycol 3350 17 Gram(s) Oral daily  senna 2 Tablet(s) Oral at bedtime  spironolactone 25 milliGRAM(s) Oral daily      Physical Exam  General: Patient comfortable in bed  Vital Signs Last 12 Hrs  T(F): 98.1 (08-19-19 @ 12:02), Max: 98.1 (08-19-19 @ 04:25)  HR: 91 (08-19-19 @ 13:26) (84 - 92)  BP: 126/58 (08-19-19 @ 13:26) (102/56 - 126/58)  BP(mean): --  RR: 18 (08-19-19 @ 12:02) (18 - 18)  SpO2: 99% (08-19-19 @ 13:26) (97% - 99%)  Neck: No palpable thyroid nodules.  CVS: S1S2, No murmurs  Respiratory: No wheezing, no crepitations  GI: Abdomen soft, bowel sounds positive  Musculoskeletal:  edema lower extremities.   Skin: No skin rashes, no ecchymosis    Diagnostics    Cortisol AM, Serum 8/19:   3.3 Chief complaint  Patient is a 73y old  Female who presents with a chief complaint of Hypoxia, SOB (19 Aug 2019 10:30)   Review of systems  Patient in bed, looks comfortable, no fever, no hypoglycemia.    Labs and Fingersticks  CAPILLARY BLOOD GLUCOSE      POCT Blood Glucose.: 343 mg/dL (19 Aug 2019 11:45)  POCT Blood Glucose.: 300 mg/dL (19 Aug 2019 07:40)  POCT Blood Glucose.: 320 mg/dL (18 Aug 2019 21:30)  POCT Blood Glucose.: 322 mg/dL (18 Aug 2019 16:13)      Anion Gap, Serum: 14 (08-19 @ 05:57)  Anion Gap, Serum: 19 <H> (08-18 @ 06:00)      Calcium, Total Serum: 10.1 (08-19 @ 05:57)  Calcium, Total Serum: 9.9 (08-18 @ 06:00)          08-19    133<L>  |  101  |  75<H>  ----------------------------<  318<H>  4.2   |  18<L>  |  2.03<H>    Ca    10.1      19 Aug 2019 05:57                          9.1    10.23 )-----------( 334      ( 19 Aug 2019 08:08 )             28.4     Medications  MEDICATIONS  (STANDING):  acetaminophen  IVPB .. 1000 milliGRAM(s) IV Intermittent once  aspirin enteric coated 81 milliGRAM(s) Oral daily  atorvastatin 40 milliGRAM(s) Oral at bedtime  buMETAnide 3 milliGRAM(s) Oral every 12 hours  chlorhexidine 2% Cloths 1 Application(s) Topical daily  clopidogrel Tablet 75 milliGRAM(s) Oral daily  dextrose 5%. 1000 milliLiter(s) (50 mL/Hr) IV Continuous <Continuous>  dextrose 50% Injectable 12.5 Gram(s) IV Push once  dextrose 50% Injectable 25 Gram(s) IV Push once  dextrose 50% Injectable 25 Gram(s) IV Push once  docusate sodium 100 milliGRAM(s) Oral two times a day  heparin  Injectable 5000 Unit(s) SubCutaneous every 12 hours  hydrALAZINE 50 milliGRAM(s) Oral every 8 hours  insulin lispro (HumaLOG) corrective regimen sliding scale   SubCutaneous three times a day before meals  insulin lispro (HumaLOG) corrective regimen sliding scale   SubCutaneous at bedtime  insulin NPH/regular 70/30 Injectable 55 Unit(s) SubCutaneous before breakfast  insulin NPH/regular 70/30 Injectable 45 Unit(s) SubCutaneous before dinner  isosorbide   dinitrate Tablet (ISORDIL) 30 milliGRAM(s) Oral three times a day  levothyroxine 50 MICROGram(s) Oral daily  lidocaine   Patch 1 Patch Transdermal daily  melatonin 3 milliGRAM(s) Oral at bedtime  metoprolol succinate ER 25 milliGRAM(s) Oral daily  montelukast 10 milliGRAM(s) Oral daily  Nephro-enrico 1 Tablet(s) Oral daily  pantoprazole    Tablet 40 milliGRAM(s) Oral before breakfast  polyethylene glycol 3350 17 Gram(s) Oral daily  senna 2 Tablet(s) Oral at bedtime  spironolactone 25 milliGRAM(s) Oral daily      Physical Exam  General: Patient comfortable in bed  Vital Signs Last 12 Hrs  T(F): 98.1 (08-19-19 @ 12:02), Max: 98.1 (08-19-19 @ 04:25)  HR: 91 (08-19-19 @ 13:26) (84 - 92)  BP: 126/58 (08-19-19 @ 13:26) (102/56 - 126/58)  BP(mean): --  RR: 18 (08-19-19 @ 12:02) (18 - 18)  SpO2: 99% (08-19-19 @ 13:26) (97% - 99%)  Neck: No palpable thyroid nodules.  CVS: S1S2, No murmurs  Respiratory: No wheezing, no crepitations  GI: Abdomen soft, bowel sounds positive  Musculoskeletal:  edema lower extremities.   Skin: No skin rashes, no ecchymosis    Diagnostics    Cortisol AM, Serum 8/19:   3.3

## 2019-08-19 NOTE — PROGRESS NOTE ADULT - ASSESSMENT
Pt is a 74 yo woman with PMHx of HTN, T2DM, CAD s/p CABG (LIMA to LAD, SVG to OM, SVG to PDA 2014 at [facility]), non-dilated ICM (EF 20-25%), severe mitral regurgitation s/p mitral clip (6/13/19 Dr. Newman), severe pulm HTN, CKD, hypothyroidism admitted with worsening SOB.    A/P:  MERVIN  Likely due to cardiogenic shock  Renal function is stable at baseline but subject to fluctuations based on Renal Perfusion.  Pt currently on Bumex 3 mg PO Q12   - Continue diuretic therapy per cardiology.  - Optimize glucose control  - Monitor renal function   - Avoid further nephrotoxics, NSAIDS, RCA    CKD stage 4:  baseline Scr 1.8-2.0. Scr stable at 2.03 today.   Renal function fluctuates sec to CHF  Monitor renal function at present    SOB:  in setting of HF. Improved on diuretic therapy.   Continue diuretics per cardiology    Acidosis   Sec to Renal failure  Monitor serum Co2 at present    Hypokalemia:  in the setting of diuretic therapy.   Serum potassium stable today     Anemia:   Hb stable  Pt with iron deficiency anemia.

## 2019-08-19 NOTE — PROGRESS NOTE ADULT - ASSESSMENT
Assessment  DMT2: 73y Female with DM T2 with hyperglycemia on insulin, blood sugars continue to run high, she is eating partial meals. AM labs show cortisol suppression with dexamethasone, ruling out Cushing's.  CAD: S/P cabg On medications, stable, monitored.  Hypothyroidism:  On synthroid 50  mcg po daily, asymptomatic.  HTN: Controlled, On med.  HLD; on statin  CKD: Monitor labs/BMP,         Plan:   DMT2:  Will discontinue current insulin regimen and start mixed insulin: NPH 70/30 (45 units) and Humalog sliding scale. Will continue monitoring FS, log, and follow up.   CAD: S/P cabg On medications, stable, monitored.  Hypothyroidism: tsh 2.0  On synthroid 50  mcg po daily, asymptomatic. F/U tsh as outpatient  HTN: Continue treatment, monitoring, Primary team FU  HLD; on statin  CKD: Continue monitoring labs, renal FU Assessment  DMT2: 73y Female with DM T2 with hyperglycemia on insulin, blood sugars continue to run high, she is eating partial meals. Ruled out for Cushing's.  CAD: S/P cabg On medications, stable, monitored.  Hypothyroidism:  On synthroid 50  mcg po daily, asymptomatic.  HTN: Controlled, On med.  HLD; on statin  CKD: Monitor labs/BMP,         Plan:   DMT2:  Will discontinue current insulin regimen and start mixed insulin: NPH 70/30 50 units before breakfast and 45u before dinner, continue Humalog correction scale coverage. Will continue monitoring FS, log, and follow up.   CAD: S/P cabg On medications, stable, monitored.  Hypothyroidism: On synthroid 50  mcg po daily, asymptomatic. F/U with TFTs as outpatient  HTN: Continue treatment, monitoring, Primary team FU  HLD; on statin  CKD: Continue monitoring labs, renal FU

## 2019-08-20 LAB
ANION GAP SERPL CALC-SCNC: 15 MMOL/L — SIGNIFICANT CHANGE UP (ref 5–17)
BUN SERPL-MCNC: 76 MG/DL — HIGH (ref 7–23)
CALCIUM SERPL-MCNC: 10.3 MG/DL — SIGNIFICANT CHANGE UP (ref 8.4–10.5)
CHLORIDE SERPL-SCNC: 98 MMOL/L — SIGNIFICANT CHANGE UP (ref 96–108)
CO2 SERPL-SCNC: 21 MMOL/L — LOW (ref 22–31)
CREAT SERPL-MCNC: 1.89 MG/DL — HIGH (ref 0.5–1.3)
GLUCOSE BLDC GLUCOMTR-MCNC: 259 MG/DL — HIGH (ref 70–99)
GLUCOSE BLDC GLUCOMTR-MCNC: 295 MG/DL — HIGH (ref 70–99)
GLUCOSE BLDC GLUCOMTR-MCNC: 316 MG/DL — HIGH (ref 70–99)
GLUCOSE BLDC GLUCOMTR-MCNC: 390 MG/DL — HIGH (ref 70–99)
GLUCOSE SERPL-MCNC: 219 MG/DL — HIGH (ref 70–99)
POTASSIUM SERPL-MCNC: 3.6 MMOL/L — SIGNIFICANT CHANGE UP (ref 3.5–5.3)
POTASSIUM SERPL-SCNC: 3.6 MMOL/L — SIGNIFICANT CHANGE UP (ref 3.5–5.3)
SODIUM SERPL-SCNC: 134 MMOL/L — LOW (ref 135–145)

## 2019-08-20 RX ORDER — INSULIN NPH HUM/REG INSULIN HM 70-30/ML
70 VIAL (ML) SUBCUTANEOUS
Refills: 0 | Status: DISCONTINUED | OUTPATIENT
Start: 2019-08-20 | End: 2019-08-22

## 2019-08-20 RX ORDER — INSULIN NPH HUM/REG INSULIN HM 70-30/ML
60 VIAL (ML) SUBCUTANEOUS
Refills: 0 | Status: DISCONTINUED | OUTPATIENT
Start: 2019-08-20 | End: 2019-08-22

## 2019-08-20 RX ORDER — INSULIN LISPRO 100/ML
VIAL (ML) SUBCUTANEOUS AT BEDTIME
Refills: 0 | Status: DISCONTINUED | OUTPATIENT
Start: 2019-08-20 | End: 2019-08-30

## 2019-08-20 RX ORDER — INSULIN LISPRO 100/ML
10 VIAL (ML) SUBCUTANEOUS
Refills: 0 | Status: DISCONTINUED | OUTPATIENT
Start: 2019-08-20 | End: 2019-08-21

## 2019-08-20 RX ORDER — INSULIN LISPRO 100/ML
VIAL (ML) SUBCUTANEOUS
Refills: 0 | Status: DISCONTINUED | OUTPATIENT
Start: 2019-08-20 | End: 2019-08-30

## 2019-08-20 RX ADMIN — Medication 55 UNIT(S): at 08:26

## 2019-08-20 RX ADMIN — BUMETANIDE 3 MILLIGRAM(S): 0.25 INJECTION INTRAMUSCULAR; INTRAVENOUS at 17:23

## 2019-08-20 RX ADMIN — Medication 50 MILLIGRAM(S): at 05:08

## 2019-08-20 RX ADMIN — HEPARIN SODIUM 5000 UNIT(S): 5000 INJECTION INTRAVENOUS; SUBCUTANEOUS at 05:09

## 2019-08-20 RX ADMIN — Medication 100 MILLIGRAM(S): at 17:23

## 2019-08-20 RX ADMIN — ISOSORBIDE DINITRATE 30 MILLIGRAM(S): 5 TABLET ORAL at 13:51

## 2019-08-20 RX ADMIN — Medication 3 MILLIGRAM(S): at 21:23

## 2019-08-20 RX ADMIN — Medication 1 TABLET(S): at 12:25

## 2019-08-20 RX ADMIN — Medication 50 MILLIGRAM(S): at 13:51

## 2019-08-20 RX ADMIN — Medication 25 MILLIGRAM(S): at 05:08

## 2019-08-20 RX ADMIN — POLYETHYLENE GLYCOL 3350 17 GRAM(S): 17 POWDER, FOR SOLUTION ORAL at 12:25

## 2019-08-20 RX ADMIN — Medication 100 MILLIGRAM(S): at 05:08

## 2019-08-20 RX ADMIN — Medication 650 MILLIGRAM(S): at 05:38

## 2019-08-20 RX ADMIN — Medication 6: at 08:26

## 2019-08-20 RX ADMIN — BUMETANIDE 3 MILLIGRAM(S): 0.25 INJECTION INTRAMUSCULAR; INTRAVENOUS at 05:10

## 2019-08-20 RX ADMIN — ATORVASTATIN CALCIUM 40 MILLIGRAM(S): 80 TABLET, FILM COATED ORAL at 21:22

## 2019-08-20 RX ADMIN — Medication 650 MILLIGRAM(S): at 05:08

## 2019-08-20 RX ADMIN — HEPARIN SODIUM 5000 UNIT(S): 5000 INJECTION INTRAVENOUS; SUBCUTANEOUS at 17:22

## 2019-08-20 RX ADMIN — LIDOCAINE 1 PATCH: 4 CREAM TOPICAL at 07:00

## 2019-08-20 RX ADMIN — SPIRONOLACTONE 25 MILLIGRAM(S): 25 TABLET, FILM COATED ORAL at 05:08

## 2019-08-20 RX ADMIN — Medication 1: at 21:23

## 2019-08-20 RX ADMIN — ISOSORBIDE DINITRATE 30 MILLIGRAM(S): 5 TABLET ORAL at 21:22

## 2019-08-20 RX ADMIN — Medication 10 UNIT(S): at 17:22

## 2019-08-20 RX ADMIN — LIDOCAINE 1 PATCH: 4 CREAM TOPICAL at 10:00

## 2019-08-20 RX ADMIN — Medication 81 MILLIGRAM(S): at 12:25

## 2019-08-20 RX ADMIN — Medication 50 MICROGRAM(S): at 05:08

## 2019-08-20 RX ADMIN — Medication 10: at 12:25

## 2019-08-20 RX ADMIN — ISOSORBIDE DINITRATE 30 MILLIGRAM(S): 5 TABLET ORAL at 05:08

## 2019-08-20 RX ADMIN — MONTELUKAST 10 MILLIGRAM(S): 4 TABLET, CHEWABLE ORAL at 12:25

## 2019-08-20 RX ADMIN — CLOPIDOGREL BISULFATE 75 MILLIGRAM(S): 75 TABLET, FILM COATED ORAL at 12:25

## 2019-08-20 RX ADMIN — Medication 50 MILLIGRAM(S): at 21:22

## 2019-08-20 RX ADMIN — CHLORHEXIDINE GLUCONATE 1 APPLICATION(S): 213 SOLUTION TOPICAL at 21:23

## 2019-08-20 RX ADMIN — LIDOCAINE 1 PATCH: 4 CREAM TOPICAL at 21:23

## 2019-08-20 RX ADMIN — Medication 4: at 17:21

## 2019-08-20 RX ADMIN — Medication 0.25 MILLIGRAM(S): at 13:59

## 2019-08-20 RX ADMIN — Medication 60 UNIT(S): at 17:22

## 2019-08-20 RX ADMIN — PANTOPRAZOLE SODIUM 40 MILLIGRAM(S): 20 TABLET, DELAYED RELEASE ORAL at 05:09

## 2019-08-20 NOTE — PROGRESS NOTE ADULT - ASSESSMENT
Pt is a 74 yo woman with PMHx of HTN, T2DM, CAD s/p CABG (LIMA to LAD, SVG to OM, SVG to PDA 2014 at [facility]), non-dilated ICM (EF 20-25%), severe mitral regurgitation s/p mitral clip (6/13/19 Dr. Newman), severe pulm HTN, CKD, hypothyroidism admitted with worsening SOB.    A/P:  MERVIN  Likely due to cardiogenic shock  Renal function is stable at baseline but subject to fluctuations based on Renal Perfusion.  Pt currently on Bumex 3 mg PO Q12   - Continue diuretic therapy per cardiology.  - Optimize glucose control  - Monitor renal function   - Avoid further nephrotoxics, NSAIDS, RCA    CKD stage 4:  baseline Scr 1.8-2.0. Scr stable today.   Renal function fluctuates sec to CHF  Monitor renal function at present    SOB:  in setting of HF. Improved on diuretic therapy.   Continue diuretics per cardiology    Acidosis   Sec to Renal failure  Monitor serum Co2 at present    Hypokalemia:  in the setting of diuretic therapy.   Serum potassium stable at 3.6. Can give KCL 40meq PO x 1 today     Anemia:   Hb stable  Pt with iron deficiency anemia.

## 2019-08-20 NOTE — CHART NOTE - NSCHARTNOTEFT_GEN_A_CORE
Patient is a 73y old  Female who presents with a chief complaint of Hypoxia, SOB (20 Aug 2019 06:50)  Informed by RN of pt noted to have had a burst of PAT up to rate 140 bpm X 2.25 sec.   Pt was asleep at the time  This AM pt denied having cp, sob, dizziness, n/v or palpitations overnight to coincide with episode      Vital Signs Last 24 Hrs  T(C): 36.7 (20 Aug 2019 04:20), Max: 36.9 (20 Aug 2019 04:08)  T(F): 98 (20 Aug 2019 04:20), Max: 98.5 (20 Aug 2019 04:08)  HR: 85 (20 Aug 2019 05:07) (85 - 92)  BP: 108/60 (20 Aug 2019 05:07) (102/56 - 126/58)  BP(mean): --  RR: 18 (20 Aug 2019 04:20) (18 - 18)  SpO2: 98% (20 Aug 2019 04:20) (97% - 99%)        Labs:                        9.1    10.23 )-----------( 334      ( 19 Aug 2019 08:08 )             28.4     08-20    134<L>  |  98  |  76<H>  ----------------------------<  219<H>  3.6   |  21<L>  |  1.89<H>    Ca    10.3      20 Aug 2019 07:17      Assessment & Plan:  HPI:  Pt is a 74 yo woman with PMHx of HTN, T2DM, CAD s/p CABG (LIMA to LAD, SVG to OM, SVG to PDA 2014 at [facility]), non-dilated ICM (EF 20-25%), severe mitral regurgitation s/p mitral clip (6/13/19 Dr. Newman), severe pulm HTN, CKD, hypothyroidism, who is presenting [facility] ED [facility] Also complaining of intermittent chest pain. Of note, pt was recently discharged on 6/19 after having mitral clip procedure completed. Pt says that she has been experiencing SOB for about 1 week. However, she was never noted to be hypoxic in rehab until today. She was not able to provide much hx 2/2 SOB and being on bipap. Daughter at bedside reporting that pt was well immediately after discharge. However, she gradually developed worsening SOB. Better with rest initially. Nothing alleviating SOB now. It is constant throughout the day, made worse with exertion. No fevers, chills, nausea, vomiting, cough, sputum production, chest tightness, pressure, palpitations, LOC, syncope.  In the ED, pt afebrile, , /75, RR 22, O2 100% on 2L NC. She subsequently developed worsened work of breathing and was placed on bipap with improvement. She was given , lasix 40mg IVP x1. CXR significant for pulmonary edema. Anemia to 9.3/28.5. BUN/Cr 42/1.72 (54/2.54 on 6/19). BNP 6626. VBG 7.49/29/61/22. TTE pending. (05 Jul 2019 19:02)    Patient with noted burse of PAT up to 140 bpm X 2.25 sec. overnight while asleep  VSS    PLAN:  >Continue with telemetry  >Continue present medication regimen  >Will endorse to primary team in AM; follow up per Attending

## 2019-08-20 NOTE — PROGRESS NOTE ADULT - SUBJECTIVE AND OBJECTIVE BOX
[facility] [place] [facility]   MD GONZALEZ SHAH D.O, PA    TELEPHONE NUMBERS:  OFFICE: [phone]  DR. SANTOYO CELL: [phone]  JUSTEN FIELD CELL: [phone]  DR. RIDER CELL:  [phone]    RENAL FOLLOW UP NOTE  --------------------------------------------------------------------------------  HPI: Pt seen and examined at bedside.   Keira SOB, chest pain     PAST HISTORY  --------------------------------------------------------------------------------  No significant changes to PMH, PSH, FHx, SHx, unless otherwise noted    ALLERGIES & MEDICATIONS  --------------------------------------------------------------------------------  Allergies    azithromycin (Hives; Pruritus)    Intolerances      Standing Inpatient Medications  acetaminophen  IVPB .. 1000 milliGRAM(s) IV Intermittent once  aspirin enteric coated 81 milliGRAM(s) Oral daily  atorvastatin 40 milliGRAM(s) Oral at bedtime  buMETAnide 3 milliGRAM(s) Oral every 12 hours  chlorhexidine 2% Cloths 1 Application(s) Topical daily  clopidogrel Tablet 75 milliGRAM(s) Oral daily  dextrose 5%. 1000 milliLiter(s) IV Continuous <Continuous>  dextrose 50% Injectable 12.5 Gram(s) IV Push once  dextrose 50% Injectable 25 Gram(s) IV Push once  dextrose 50% Injectable 25 Gram(s) IV Push once  docusate sodium 100 milliGRAM(s) Oral two times a day  heparin  Injectable 5000 Unit(s) SubCutaneous every 12 hours  hydrALAZINE 50 milliGRAM(s) Oral every 8 hours  insulin lispro (HumaLOG) corrective regimen sliding scale   SubCutaneous three times a day before meals  insulin lispro (HumaLOG) corrective regimen sliding scale   SubCutaneous at bedtime  insulin NPH/regular 70/30 Injectable 55 Unit(s) SubCutaneous before breakfast  insulin NPH/regular 70/30 Injectable 45 Unit(s) SubCutaneous before dinner  isosorbide   dinitrate Tablet (ISORDIL) 30 milliGRAM(s) Oral three times a day  levothyroxine 50 MICROGram(s) Oral daily  lidocaine   Patch 1 Patch Transdermal daily  melatonin 3 milliGRAM(s) Oral at bedtime  metoprolol succinate ER 25 milliGRAM(s) Oral daily  montelukast 10 milliGRAM(s) Oral daily  Nephro-enrico 1 Tablet(s) Oral daily  pantoprazole    Tablet 40 milliGRAM(s) Oral before breakfast  polyethylene glycol 3350 17 Gram(s) Oral daily  senna 2 Tablet(s) Oral at bedtime  spironolactone 25 milliGRAM(s) Oral daily    PRN Inpatient Medications  acetaminophen   Tablet .. 650 milliGRAM(s) Oral every 6 hours PRN  ALPRAZolam 0.25 milliGRAM(s) Oral three times a day PRN  dextrose 40% Gel 15 Gram(s) Oral once PRN  glucagon  Injectable 1 milliGRAM(s) IntraMuscular once PRN  traMADol 25 milliGRAM(s) Oral every 6 hours PRN      REVIEW OF SYSTEMS  --------------------------------------------------------------------------------  General: no fever  CVS: no chest pain  RESP: no sob, no cough  ABD: no abdominal pain  : no dysuria,  MSK: no edema     VITALS/PHYSICAL EXAM  --------------------------------------------------------------------------------  T(C): 36.7 (08-20-19 @ 04:20), Max: 36.9 (08-20-19 @ 04:08)  HR: 85 (08-20-19 @ 05:07) (85 - 92)  BP: 108/60 (08-20-19 @ 05:07) (102/56 - 126/58)  RR: 18 (08-20-19 @ 04:20) (18 - 18)  SpO2: 98% (08-20-19 @ 04:20) (97% - 99%)  Wt(kg): --        08-19-19 @ 07:01  -  08-20-19 @ 07:00  --------------------------------------------------------  IN: 800 mL / OUT: 1400 mL / NET: -600 mL      Physical Exam:  	Gen: NAD  	HEENT: MMM  	Pulm: CTA B/L  	CV: S1S2  	Abd: Soft, +BS  	Ext: No LE edema B/L                      Neuro: Awake   	Skin: Warm and Dry     LABS/STUDIES  --------------------------------------------------------------------------------              9.1    10.23 >-----------<  334      [08-19-19 @ 08:08]              28.4     134  |  98  |  76  ----------------------------<  219      [08-20-19 @ 07:17]  3.6   |  21  |  1.89        Ca     10.3     [08-20-19 @ 07:17]    Uric acid 13.3      [08-19-19 @ 05:57]    Creatinine Trend:  SCr 1.89 [08-20 @ 07:17]  SCr 2.03 [08-19 @ 05:57]  SCr 1.92 [08-18 @ 06:00]  SCr 1.86 [08-17 @ 05:34]  SCr 1.90 [08-16 @ 06:08]        Iron 42, TIBC 348, %sat 12      [07-05-19 @ 22:32]  Ferritin 130      [07-05-19 @ 22:32]  .4 (Ca --)      [04-25-19 @ 05:45]   --  PTH 43.55 (Ca --)      [09-10-18 @ 07:15]   --  PTH 36.60 (Ca --)      [09-08-18 @ 03:15]   --  Vitamin D (25OH) 25.9      [05-01-19 @ 04:00]  HbA1c 7.4      [07-05-19 @ 22:32]  TSH 2.00      [07-05-19 @ 22:32]  Lipid: chol 148, , HDL 35,       [06-01-19 @ 08:00]

## 2019-08-20 NOTE — PROGRESS NOTE ADULT - SUBJECTIVE AND OBJECTIVE BOX
CHIEF COMPLAINT:    SUBJECTIVE:     REVIEW OF SYSTEMS:    CONSTITUTIONAL: (  )  weakness,  (  ) fevers or chills  EYES/ENT: (  )visual changes;     NECK: (  ) pain or stiffness  RESPIRATORY:   (  )cough, wheezing, hemoptysis;  (  ) shortness of breath  CARDIOVASCULAR:  (  )chest pain or palpitations  GASTROINTESTINAL:   (  )abdominal or epigastric pain.  (  ) nausea, vomiting, or hematemesis;   (   ) diarrhea or constipation.   GENITOURINARY:   (    ) dysuria, frequency or hematuria  NEUROLOGICAL:  (   ) numbness or weakness   All other review of systems is negative unless indicated above    Vital Signs Last 24 Hrs  T(C): 36.7 (20 Aug 2019 04:20), Max: 36.9 (20 Aug 2019 04:08)  T(F): 98 (20 Aug 2019 04:20), Max: 98.5 (20 Aug 2019 04:08)  HR: 85 (20 Aug 2019 05:07) (85 - 92)  BP: 108/60 (20 Aug 2019 05:07) (102/56 - 126/58)  BP(mean): --  RR: 18 (20 Aug 2019 04:20) (18 - 18)  SpO2: 98% (20 Aug 2019 04:20) (97% - 99%)    I&O's Summary    18 Aug 2019 07:01  -  19 Aug 2019 07:00  --------------------------------------------------------  IN: 590 mL / OUT: 1125 mL / NET: -535 mL    19 Aug 2019 07:01  -  20 Aug 2019 06:50  --------------------------------------------------------  IN: 600 mL / OUT: 900 mL / NET: -300 mL        CAPILLARY BLOOD GLUCOSE      POCT Blood Glucose.: 334 mg/dL (19 Aug 2019 22:44)  POCT Blood Glucose.: 360 mg/dL (19 Aug 2019 21:09)  POCT Blood Glucose.: 295 mg/dL (19 Aug 2019 16:09)  POCT Blood Glucose.: 343 mg/dL (19 Aug 2019 11:45)  POCT Blood Glucose.: 300 mg/dL (19 Aug 2019 07:40)      PHYSICAL EXAM:    Constitutional:  (   ) NAD,   (   )awake and alert  HEENT: PERR, EOMI,    Neck: Soft and supple, No LAD, No JVD  Respiratory:  (    Breath sounds are clear bilaterally,    (   ) wheezing, rales or rhonchi  Cardiovascular:     (   )S1 and S2, regular rate and rhythm, no Murmurs, gallops or rubs  Gastrointestinal:  (   )Bowel Sounds present, soft,   (  )nontender, nondistended,    Extremities:    (  ) peripheral edema  Vascular: 2+ peripheral pulses  Neurological:    (    )A/O x 3,   (  ) focal deficits  Musculoskeletal:    (   )  normal strength b/l upper  (     ) normal  lower extremities  Skin: No rashes    MEDICATIONS:  MEDICATIONS  (STANDING):  acetaminophen  IVPB .. 1000 milliGRAM(s) IV Intermittent once  aspirin enteric coated 81 milliGRAM(s) Oral daily  atorvastatin 40 milliGRAM(s) Oral at bedtime  buMETAnide 3 milliGRAM(s) Oral every 12 hours  chlorhexidine 2% Cloths 1 Application(s) Topical daily  clopidogrel Tablet 75 milliGRAM(s) Oral daily  dextrose 5%. 1000 milliLiter(s) (50 mL/Hr) IV Continuous <Continuous>  dextrose 50% Injectable 12.5 Gram(s) IV Push once  dextrose 50% Injectable 25 Gram(s) IV Push once  dextrose 50% Injectable 25 Gram(s) IV Push once  docusate sodium 100 milliGRAM(s) Oral two times a day  heparin  Injectable 5000 Unit(s) SubCutaneous every 12 hours  hydrALAZINE 50 milliGRAM(s) Oral every 8 hours  insulin lispro (HumaLOG) corrective regimen sliding scale   SubCutaneous three times a day before meals  insulin lispro (HumaLOG) corrective regimen sliding scale   SubCutaneous at bedtime  insulin NPH/regular 70/30 Injectable 55 Unit(s) SubCutaneous before breakfast  insulin NPH/regular 70/30 Injectable 45 Unit(s) SubCutaneous before dinner  isosorbide   dinitrate Tablet (ISORDIL) 30 milliGRAM(s) Oral three times a day  levothyroxine 50 MICROGram(s) Oral daily  lidocaine   Patch 1 Patch Transdermal daily  melatonin 3 milliGRAM(s) Oral at bedtime  metoprolol succinate ER 25 milliGRAM(s) Oral daily  montelukast 10 milliGRAM(s) Oral daily  Nephro-enrico 1 Tablet(s) Oral daily  pantoprazole    Tablet 40 milliGRAM(s) Oral before breakfast  polyethylene glycol 3350 17 Gram(s) Oral daily  senna 2 Tablet(s) Oral at bedtime  spironolactone 25 milliGRAM(s) Oral daily      LABS: All Labs Reviewed:                        9.1    10.23 )-----------( 334      ( 19 Aug 2019 08:08 )             28.4     08-19    133<L>  |  101  |  75<H>  ----------------------------<  318<H>  4.2   |  18<L>  |  2.03<H>    Ca    10.1      19 Aug 2019 05:57            Blood Culture:   Urine Culture      RADIOLOGY/EKG:    ASSESSMENT AND PLAN:    DVT PPX:    ADVANCED DIRECTIVE:    DISPOSITION: CHIEF COMPLAINT:  patient awake verbal laying in the bed without S OB but is still complaining of weakness.  ,  her blood sugar still elevated around 300 in spite of changing Lantus  to NPH 70/30 her blood sugar at 7:10 AM was 295 while I was at the bedside  SUBJECTIVE:     REVIEW OF SYSTEMS:    CONSTITUTIONAL: (x  )  weakness,  (  ) fevers or chills  EYES/ENT: (  )visual changes;     NECK: (  ) pain or stiffness  RESPIRATORY:   (  )cough, wheezing, hemoptysis;  ( x ) shortness of breath  CARDIOVASCULAR:  (  )chest pain or palpitations  GASTROINTESTINAL:   (  )abdominal or epigastric pain.  (  ) nausea, vomiting, or hematemesis;   (   ) diarrhea or constipation.   GENITOURINARY:   (    ) dysuria, frequency or hematuria  NEUROLOGICAL:  (   ) numbness or weakness   All other review of systems is negative unless indicated above    Vital Signs Last 24 Hrs  T(C): 36.7 (20 Aug 2019 04:20), Max: 36.9 (20 Aug 2019 04:08)  T(F): 98 (20 Aug 2019 04:20), Max: 98.5 (20 Aug 2019 04:08)  HR: 85 (20 Aug 2019 05:07) (85 - 92)  BP: 108/60 (20 Aug 2019 05:07) (102/56 - 126/58)  BP(mean): --  RR: 18 (20 Aug 2019 04:20) (18 - 18)  SpO2: 98% (20 Aug 2019 04:20) (97% - 99%)    I&O's Summary    18 Aug 2019 07:01  -  19 Aug 2019 07:00  --------------------------------------------------------  IN: 590 mL / OUT: 1125 mL / NET: -535 mL    19 Aug 2019 07:01  -  20 Aug 2019 06:50  --------------------------------------------------------  IN: 600 mL / OUT: 900 mL / NET: -300 mL        CAPILLARY BLOOD GLUCOSE      POCT Blood Glucose.: 334 mg/dL (19 Aug 2019 22:44)  POCT Blood Glucose.: 360 mg/dL (19 Aug 2019 21:09)  POCT Blood Glucose.: 295 mg/dL (19 Aug 2019 16:09)  POCT Blood Glucose.: 343 mg/dL (19 Aug 2019 11:45)  POCT Blood Glucose.: 300 mg/dL (19 Aug 2019 07:40)      PHYSICAL EXAM:    Constitutional:  ( x  ) NAD,   (  x )awake and alert  HEENT: PERR, EOMI,    Neck: Soft and supple, No LAD, No JVD  Respiratory:  ( x   Breath sounds are clear bilaterally,    (   ) wheezing, rales or rhonchi  Cardiovascular:     (  x )S1 and S2, r   Gastrointestinal:  (  x )Bowel Sounds present, soft,   (  )nontender, nondistended,    Extremities:    (  ) peripheral edema  Vascular: 2+ peripheral pulses  Neurological:    (  x  )A/O x 3,   (  ) focal deficits  Musculoskeletal:    (  x )  normal strength b/l upper  (     ) normal  lower extremities  Skin: No rashes    MEDICATIONS:  MEDICATIONS  (STANDING):  acetaminophen  IVPB .. 1000 milliGRAM(s) IV Intermittent once  aspirin enteric coated 81 milliGRAM(s) Oral daily  atorvastatin 40 milliGRAM(s) Oral at bedtime  buMETAnide 3 milliGRAM(s) Oral every 12 hours  chlorhexidine 2% Cloths 1 Application(s) Topical daily  clopidogrel Tablet 75 milliGRAM(s) Oral daily  dextrose 5%. 1000 milliLiter(s) (50 mL/Hr) IV Continuous <Continuous>  dextrose 50% Injectable 12.5 Gram(s) IV Push once  dextrose 50% Injectable 25 Gram(s) IV Push once  dextrose 50% Injectable 25 Gram(s) IV Push once  docusate sodium 100 milliGRAM(s) Oral two times a day  heparin  Injectable 5000 Unit(s) SubCutaneous every 12 hours  hydrALAZINE 50 milliGRAM(s) Oral every 8 hours  insulin lispro (HumaLOG) corrective regimen sliding scale   SubCutaneous three times a day before meals  insulin lispro (HumaLOG) corrective regimen sliding scale   SubCutaneous at bedtime  insulin NPH/regular 70/30 Injectable 55 Unit(s) SubCutaneous before breakfast  insulin NPH/regular 70/30 Injectable 45 Unit(s) SubCutaneous before dinner  isosorbide   dinitrate Tablet (ISORDIL) 30 milliGRAM(s) Oral three times a day  levothyroxine 50 MICROGram(s) Oral daily  lidocaine   Patch 1 Patch Transdermal daily  melatonin 3 milliGRAM(s) Oral at bedtime  metoprolol succinate ER 25 milliGRAM(s) Oral daily  montelukast 10 milliGRAM(s) Oral daily  Nephro-enrico 1 Tablet(s) Oral daily  pantoprazole    Tablet 40 milliGRAM(s) Oral before breakfast  polyethylene glycol 3350 17 Gram(s) Oral daily  senna 2 Tablet(s) Oral at bedtime  spironolactone 25 milliGRAM(s) Oral daily      LABS: All Labs Reviewed:                        9.1    10.23 )-----------( 334      ( 19 Aug 2019 08:08 )             28.4     08-19    133<L>  |  101  |  75<H>  ----------------------------<  318<H>  4.2   |  18<L>  |  2.03<H>    Ca    10.1      19 Aug 2019 05:57            Blood Culture:   Urine Culture      RADIOLOGY/EKG:    ASSESSMENT AND PLAN:  72 yo woman with PMHx of HTN, T2DM (A1c 7.4%), CAD s/p CABG (LIMA to LAD, SVG to OM, SVG to PDA 2014 at [facility]), non-dilated ICM (EF 20-25%), severe mitral regurgitation s/p mitral clip (6/13/19 Dr. Newman), severe pulm HTN, CKD, hypothyroidism, who is presenting to ED after [facility] worsening [facility] and [facility] Of note, pt was recently discharged on 6/19 after having mitral clip procedure completed. She initially required BiPAP with improvement in her respiratory status following diuresis. Initiated on vasopressors and inotropes in CCU, which were subsequently weaned off. Infectious work up was negative.    #HFrEF likely 2/2 ICM with MR s/p alonso clip  -  continue with Bumex 3 mg twice daily   discuss with cardiology      -    - continue hydralazine 50 mg TID and isordil 30 TID  - continue spironolactone 25 mg  - continue Metoprolol Succinate 25 mg PO Daily   - No further cardiac testing at this time.      #CAD  - continue ASA and statin   - Pt with chest pain, but also with end stage cardiomyopathy, coronary disease, had MitraClip and no further intervention feasible. She should not have trops checked.     #SVT - likely Atrial Tach vs. Atrial Flutter   - Amio initially not given due to elevated LFTs likely in the setting of congestion.   - recheck LFTs.   - Monitor on tele.   - continue metoprolol at current dose as above  - If pt has SVT again can consider amio   #diabetes on  66 units Lantus at bedtime and 52 unit  Lantus in the morning  which is not effective  Discussed with Endo  change her insulin regimen  NPH 7030  55 units before breakfast and 45 units before dinner .  Cushing's syndrome was ruled out    DVT PPX:    ADVANCED DIRECTIVE: Discussed with medical team in detail    DISPOSITION:

## 2019-08-20 NOTE — PROGRESS NOTE ADULT - SUBJECTIVE AND OBJECTIVE BOX
Chief complaint  Patient is a 73y old  Female who presents with a chief complaint of Hypoxia, SOB (20 Aug 2019 09:24)  Review of systems  Patient in bed, looks comfortable, no fever, no hypoglycemia.    Labs and Fingersticks  CAPILLARY BLOOD GLUCOSE      POCT Blood Glucose.: 316 mg/dL (20 Aug 2019 16:24)  POCT Blood Glucose.: 390 mg/dL (20 Aug 2019 11:35)  POCT Blood Glucose.: 259 mg/dL (20 Aug 2019 07:30)  POCT Blood Glucose.: 334 mg/dL (19 Aug 2019 22:44)  POCT Blood Glucose.: 360 mg/dL (19 Aug 2019 21:09)      Anion Gap, Serum: 15 (08-20 @ 07:17)  Anion Gap, Serum: 14 (08-19 @ 05:57)      Calcium, Total Serum: 10.3 (08-20 @ 07:17)  Calcium, Total Serum: 10.1 (08-19 @ 05:57)          08-20    134<L>  |  98  |  76<H>  ----------------------------<  219<H>  3.6   |  21<L>  |  1.89<H>    Ca    10.3      20 Aug 2019 07:17                          9.1    10.23 )-----------( 334      ( 19 Aug 2019 08:08 )             28.4     Medications  MEDICATIONS  (STANDING):  acetaminophen  IVPB .. 1000 milliGRAM(s) IV Intermittent once  aspirin enteric coated 81 milliGRAM(s) Oral daily  atorvastatin 40 milliGRAM(s) Oral at bedtime  buMETAnide 3 milliGRAM(s) Oral every 12 hours  chlorhexidine 2% Cloths 1 Application(s) Topical daily  clopidogrel Tablet 75 milliGRAM(s) Oral daily  dextrose 5%. 1000 milliLiter(s) (50 mL/Hr) IV Continuous <Continuous>  dextrose 50% Injectable 12.5 Gram(s) IV Push once  dextrose 50% Injectable 25 Gram(s) IV Push once  dextrose 50% Injectable 25 Gram(s) IV Push once  docusate sodium 100 milliGRAM(s) Oral two times a day  heparin  Injectable 5000 Unit(s) SubCutaneous every 12 hours  hydrALAZINE 50 milliGRAM(s) Oral every 8 hours  insulin lispro (HumaLOG) corrective regimen sliding scale   SubCutaneous three times a day before meals  insulin lispro (HumaLOG) corrective regimen sliding scale   SubCutaneous at bedtime  insulin lispro Injectable (HumaLOG) 10 Unit(s) SubCutaneous three times a day before meals  insulin NPH/regular 70/30 Injectable 70 Unit(s) SubCutaneous before breakfast  insulin NPH/regular 70/30 Injectable 60 Unit(s) SubCutaneous before dinner  isosorbide   dinitrate Tablet (ISORDIL) 30 milliGRAM(s) Oral three times a day  levothyroxine 50 MICROGram(s) Oral daily  lidocaine   Patch 1 Patch Transdermal daily  melatonin 3 milliGRAM(s) Oral at bedtime  metoprolol succinate ER 25 milliGRAM(s) Oral daily  montelukast 10 milliGRAM(s) Oral daily  Nephro-enrico 1 Tablet(s) Oral daily  pantoprazole    Tablet 40 milliGRAM(s) Oral before breakfast  polyethylene glycol 3350 17 Gram(s) Oral daily  senna 2 Tablet(s) Oral at bedtime  spironolactone 25 milliGRAM(s) Oral daily      Physical Exam  General: Patient comfortable in bed  Vital Signs Last 12 Hrs  T(F): 97.9 (08-20-19 @ 11:57), Max: 97.9 (08-20-19 @ 11:57)  HR: 82 (08-20-19 @ 13:47) (82 - 86)  BP: 111/66 (08-20-19 @ 13:47) (108/60 - 116/68)  BP(mean): --  RR: 18 (08-20-19 @ 13:47) (18 - 18)  SpO2: 100% (08-20-19 @ 13:47) (97% - 100%)  Neck: No palpable thyroid nodules.  CVS: S1S2, No murmurs  Respiratory: No wheezing, no crepitations  GI: Abdomen soft, bowel sounds positive  Musculoskeletal:  edema lower extremities.   Skin: No skin rashes, no ecchymosis    Diagnostics    Cortisol AM, Serum: AM Sched. Collection: 18-Aug-2019 06:00 (08-17 @ 12:14)  Cortisol AM, Serum: AM Sched. Collection: 19-Aug-2019 06:00 (08-18 @ 12:59)

## 2019-08-20 NOTE — PROGRESS NOTE ADULT - ASSESSMENT
Assessment  DMT2: 73y Female with DM T2 with hyperglycemia on insulin, blood sugars continue to run high despite switching to mixed insulin regimen, she is eating partial meals.   CAD: S/P cabg On medications, stable, monitored.  Hypothyroidism:  continue synthroid 50 mcg po daily, asymptomatic.  HTN: Controlled, On med.  HLD; on statin  CKD: Monitor labs/BMP,         Plan:   DMT2:  Will adjust mixed insulin dose to NPH 70/30 70 units before breakfast and 60u before dinner, as well as Humalog 10 units before meals. Will continue monitoring FS, log, and follow up.   CAD: S/P cabg On medications, stable, monitored.  Hypothyroidism: Continue synthroid 50 mcg po daily, asymptomatic. F/U with TFTs as outpatient  HTN: Continue treatment, monitoring, Primary team FU  HLD; on statin  CKD: Continue monitoring labs, renal FU

## 2019-08-21 LAB
ANION GAP SERPL CALC-SCNC: 15 MMOL/L — SIGNIFICANT CHANGE UP (ref 5–17)
BUN SERPL-MCNC: 78 MG/DL — HIGH (ref 7–23)
CALCIUM SERPL-MCNC: 9.7 MG/DL — SIGNIFICANT CHANGE UP (ref 8.4–10.5)
CHLORIDE SERPL-SCNC: 97 MMOL/L — SIGNIFICANT CHANGE UP (ref 96–108)
CO2 SERPL-SCNC: 20 MMOL/L — LOW (ref 22–31)
CREAT SERPL-MCNC: 2.02 MG/DL — HIGH (ref 0.5–1.3)
GLUCOSE BLDC GLUCOMTR-MCNC: 329 MG/DL — HIGH (ref 70–99)
GLUCOSE BLDC GLUCOMTR-MCNC: 383 MG/DL — HIGH (ref 70–99)
GLUCOSE BLDC GLUCOMTR-MCNC: 428 MG/DL — HIGH (ref 70–99)
GLUCOSE BLDC GLUCOMTR-MCNC: 429 MG/DL — HIGH (ref 70–99)
GLUCOSE BLDC GLUCOMTR-MCNC: 435 MG/DL — HIGH (ref 70–99)
GLUCOSE BLDC GLUCOMTR-MCNC: 437 MG/DL — HIGH (ref 70–99)
GLUCOSE BLDC GLUCOMTR-MCNC: 450 MG/DL — CRITICAL HIGH (ref 70–99)
GLUCOSE BLDC GLUCOMTR-MCNC: 469 MG/DL — CRITICAL HIGH (ref 70–99)
GLUCOSE BLDC GLUCOMTR-MCNC: 485 MG/DL — CRITICAL HIGH (ref 70–99)
GLUCOSE BLDC GLUCOMTR-MCNC: 493 MG/DL — CRITICAL HIGH (ref 70–99)
GLUCOSE SERPL-MCNC: 315 MG/DL — HIGH (ref 70–99)
POTASSIUM SERPL-MCNC: 4.1 MMOL/L — SIGNIFICANT CHANGE UP (ref 3.5–5.3)
POTASSIUM SERPL-SCNC: 4.1 MMOL/L — SIGNIFICANT CHANGE UP (ref 3.5–5.3)
SODIUM SERPL-SCNC: 132 MMOL/L — LOW (ref 135–145)

## 2019-08-21 RX ORDER — INSULIN LISPRO 100/ML
24 VIAL (ML) SUBCUTANEOUS
Refills: 0 | Status: DISCONTINUED | OUTPATIENT
Start: 2019-08-21 | End: 2019-08-22

## 2019-08-21 RX ORDER — COLCHICINE 0.6 MG
0.6 TABLET ORAL DAILY
Refills: 0 | Status: DISCONTINUED | OUTPATIENT
Start: 2019-08-21 | End: 2019-09-05

## 2019-08-21 RX ORDER — INSULIN LISPRO 100/ML
15 VIAL (ML) SUBCUTANEOUS
Refills: 0 | Status: DISCONTINUED | OUTPATIENT
Start: 2019-08-21 | End: 2019-08-21

## 2019-08-21 RX ADMIN — HEPARIN SODIUM 5000 UNIT(S): 5000 INJECTION INTRAVENOUS; SUBCUTANEOUS at 05:38

## 2019-08-21 RX ADMIN — ISOSORBIDE DINITRATE 30 MILLIGRAM(S): 5 TABLET ORAL at 21:22

## 2019-08-21 RX ADMIN — Medication 100 MILLIGRAM(S): at 05:38

## 2019-08-21 RX ADMIN — ISOSORBIDE DINITRATE 30 MILLIGRAM(S): 5 TABLET ORAL at 05:38

## 2019-08-21 RX ADMIN — LIDOCAINE 1 PATCH: 4 CREAM TOPICAL at 21:22

## 2019-08-21 RX ADMIN — ATORVASTATIN CALCIUM 40 MILLIGRAM(S): 80 TABLET, FILM COATED ORAL at 21:22

## 2019-08-21 RX ADMIN — LIDOCAINE 1 PATCH: 4 CREAM TOPICAL at 08:12

## 2019-08-21 RX ADMIN — Medication 4: at 08:13

## 2019-08-21 RX ADMIN — Medication 24 UNIT(S): at 17:48

## 2019-08-21 RX ADMIN — Medication 10 UNIT(S): at 08:13

## 2019-08-21 RX ADMIN — MONTELUKAST 10 MILLIGRAM(S): 4 TABLET, CHEWABLE ORAL at 12:29

## 2019-08-21 RX ADMIN — Medication 3: at 22:26

## 2019-08-21 RX ADMIN — BUMETANIDE 3 MILLIGRAM(S): 0.25 INJECTION INTRAMUSCULAR; INTRAVENOUS at 05:37

## 2019-08-21 RX ADMIN — Medication 70 UNIT(S): at 08:13

## 2019-08-21 RX ADMIN — Medication 50 MILLIGRAM(S): at 21:22

## 2019-08-21 RX ADMIN — SPIRONOLACTONE 25 MILLIGRAM(S): 25 TABLET, FILM COATED ORAL at 05:37

## 2019-08-21 RX ADMIN — BUMETANIDE 3 MILLIGRAM(S): 0.25 INJECTION INTRAMUSCULAR; INTRAVENOUS at 17:23

## 2019-08-21 RX ADMIN — Medication 50 MILLIGRAM(S): at 14:28

## 2019-08-21 RX ADMIN — POLYETHYLENE GLYCOL 3350 17 GRAM(S): 17 POWDER, FOR SOLUTION ORAL at 12:30

## 2019-08-21 RX ADMIN — Medication 81 MILLIGRAM(S): at 12:29

## 2019-08-21 RX ADMIN — CLOPIDOGREL BISULFATE 75 MILLIGRAM(S): 75 TABLET, FILM COATED ORAL at 12:29

## 2019-08-21 RX ADMIN — CHLORHEXIDINE GLUCONATE 1 APPLICATION(S): 213 SOLUTION TOPICAL at 21:22

## 2019-08-21 RX ADMIN — Medication 0.25 MILLIGRAM(S): at 00:49

## 2019-08-21 RX ADMIN — Medication 6: at 12:30

## 2019-08-21 RX ADMIN — Medication 50 MILLIGRAM(S): at 05:38

## 2019-08-21 RX ADMIN — Medication 25 MILLIGRAM(S): at 05:38

## 2019-08-21 RX ADMIN — Medication 3 MILLIGRAM(S): at 21:22

## 2019-08-21 RX ADMIN — Medication 1 TABLET(S): at 12:29

## 2019-08-21 RX ADMIN — Medication 6: at 17:49

## 2019-08-21 RX ADMIN — Medication 100 MILLIGRAM(S): at 17:23

## 2019-08-21 RX ADMIN — LIDOCAINE 1 PATCH: 4 CREAM TOPICAL at 09:00

## 2019-08-21 RX ADMIN — ISOSORBIDE DINITRATE 30 MILLIGRAM(S): 5 TABLET ORAL at 14:28

## 2019-08-21 RX ADMIN — Medication 50 MICROGRAM(S): at 05:37

## 2019-08-21 RX ADMIN — Medication 0.6 MILLIGRAM(S): at 17:24

## 2019-08-21 RX ADMIN — HEPARIN SODIUM 5000 UNIT(S): 5000 INJECTION INTRAVENOUS; SUBCUTANEOUS at 17:23

## 2019-08-21 RX ADMIN — TRAMADOL HYDROCHLORIDE 25 MILLIGRAM(S): 50 TABLET ORAL at 00:49

## 2019-08-21 RX ADMIN — Medication 15 UNIT(S): at 12:30

## 2019-08-21 RX ADMIN — PANTOPRAZOLE SODIUM 40 MILLIGRAM(S): 20 TABLET, DELAYED RELEASE ORAL at 05:38

## 2019-08-21 RX ADMIN — TRAMADOL HYDROCHLORIDE 25 MILLIGRAM(S): 50 TABLET ORAL at 02:00

## 2019-08-21 RX ADMIN — Medication 60 UNIT(S): at 17:49

## 2019-08-21 NOTE — PROGRESS NOTE ADULT - ASSESSMENT
Pt is a 74 yo woman with PMHx of HTN, T2DM, CAD s/p CABG (LIMA to LAD, SVG to OM, SVG to PDA 2014 at [facility]), non-dilated ICM (EF 20-25%), severe mitral regurgitation s/p mitral clip (6/13/19 Dr. Newman), severe pulm HTN, CKD, hypothyroidism admitted with worsening SOB.    A/P:  MERVIN  Likely due to cardiogenic shock  Renal function is stable at baseline but subject to fluctuations based on Renal Perfusion.  Pt currently on Bumex 3 mg PO Q12   - Continue diuretic therapy per cardiology.  - Optimize glucose control  - Monitor renal function   - Avoid further nephrotoxics, NSAIDS, RCA    Hyponatremia  in the setting of hyperglycemia. Monitor   Optimize glucose control    CKD stage 4:  baseline Scr 1.8-2.0. Scr stable today.   Renal function fluctuates sec to CHF  Monitor renal function at present    SOB:  in setting of HF. Improved on diuretic therapy.   Continue diuretics per cardiology    Acidosis   Sec to Renal failure  Monitor serum Co2 at present    Hypokalemia:  in the setting of diuretic therapy.   Serum potassium stable      Anemia:   Hb stable  Pt with iron deficiency anemia.

## 2019-08-21 NOTE — CHART NOTE - NSCHARTNOTEFT_GEN_A_CORE
MEDICINE NP NOTE  Spectra [phone]    Notified by RN, patient with lunchtime , premeal humalog 15 units +6 units sliding scale coverage was given around 1230 pm.  Repeat fingerstick 2 hours post 493, 469.  Discussed with Dr Banks, do not give additional insulin at this time.  Repeat fingerstick at dinnertime.    Patient also with complaints of left thumb pain, uric acid level from 8/19 13.3.  Discussed with Dr Soria, ok to give colchicine, renally dosed.  Pain and inflammatory response likely the cause of hyperglycemia. Will continue to monitor.

## 2019-08-21 NOTE — PROGRESS NOTE ADULT - SUBJECTIVE AND OBJECTIVE BOX
[facility]   MD GONZALEZ SHAH D.O, PA    TELEPHONE NUMBERS:  OFFICE: [phone]  DR. SANTOYO CELL: [phone]  JUSTEN FIELD CELL: [phone]  DR. RIDER CELL:  [phone]    RENAL FOLLOW UP NOTE  --------------------------------------------------------------------------------  HPI: Pt seen and examined at bedside. PT admits to weakness. Denies SOB.   Denies SOB, chest pain     PAST HISTORY  --------------------------------------------------------------------------------  No significant changes to PMH, PSH, FHx, SHx, unless otherwise noted    ALLERGIES & MEDICATIONS  --------------------------------------------------------------------------------  Allergies    azithromycin (Hives; Pruritus)    Intolerances      Standing Inpatient Medications  acetaminophen  IVPB .. 1000 milliGRAM(s) IV Intermittent once  aspirin enteric coated 81 milliGRAM(s) Oral daily  atorvastatin 40 milliGRAM(s) Oral at bedtime  buMETAnide 3 milliGRAM(s) Oral every 12 hours  chlorhexidine 2% Cloths 1 Application(s) Topical daily  clopidogrel Tablet 75 milliGRAM(s) Oral daily  dextrose 5%. 1000 milliLiter(s) IV Continuous <Continuous>  dextrose 50% Injectable 12.5 Gram(s) IV Push once  dextrose 50% Injectable 25 Gram(s) IV Push once  dextrose 50% Injectable 25 Gram(s) IV Push once  docusate sodium 100 milliGRAM(s) Oral two times a day  heparin  Injectable 5000 Unit(s) SubCutaneous every 12 hours  hydrALAZINE 50 milliGRAM(s) Oral every 8 hours  insulin lispro (HumaLOG) corrective regimen sliding scale   SubCutaneous three times a day before meals  insulin lispro (HumaLOG) corrective regimen sliding scale   SubCutaneous at bedtime  insulin lispro Injectable (HumaLOG) 10 Unit(s) SubCutaneous three times a day before meals  insulin NPH/regular 70/30 Injectable 70 Unit(s) SubCutaneous before breakfast  insulin NPH/regular 70/30 Injectable 60 Unit(s) SubCutaneous before dinner  isosorbide   dinitrate Tablet (ISORDIL) 30 milliGRAM(s) Oral three times a day  levothyroxine 50 MICROGram(s) Oral daily  lidocaine   Patch 1 Patch Transdermal daily  melatonin 3 milliGRAM(s) Oral at bedtime  metoprolol succinate ER 25 milliGRAM(s) Oral daily  montelukast 10 milliGRAM(s) Oral daily  Nephro-enrico 1 Tablet(s) Oral daily  pantoprazole    Tablet 40 milliGRAM(s) Oral before breakfast  polyethylene glycol 3350 17 Gram(s) Oral daily  senna 2 Tablet(s) Oral at bedtime  spironolactone 25 milliGRAM(s) Oral daily    PRN Inpatient Medications  acetaminophen   Tablet .. 650 milliGRAM(s) Oral every 6 hours PRN  ALPRAZolam 0.25 milliGRAM(s) Oral three times a day PRN  dextrose 40% Gel 15 Gram(s) Oral once PRN  glucagon  Injectable 1 milliGRAM(s) IntraMuscular once PRN  traMADol 25 milliGRAM(s) Oral every 6 hours PRN      REVIEW OF SYSTEMS  --------------------------------------------------------------------------------  General: no fever  CVS: no chest pain  RESP: no sob, no cough  ABD: no abdominal pain  : no dysuria,  MSK: no edema     VITALS/PHYSICAL EXAM  --------------------------------------------------------------------------------  T(C): 36.7 (08-21-19 @ 04:04), Max: 36.8 (08-20-19 @ 19:57)  HR: 97 (08-21-19 @ 04:04) (82 - 97)  BP: 115/70 (08-21-19 @ 04:04) (111/66 - 117/69)  RR: 18 (08-21-19 @ 04:04) (18 - 18)  SpO2: 98% (08-21-19 @ 04:04) (97% - 100%)  Wt(kg): --        08-20-19 @ 07:01  -  08-21-19 @ 07:00  --------------------------------------------------------  IN: 600 mL / OUT: 900 mL / NET: -300 mL    Physical Exam:  	Gen: NAD  	HEENT: MMM  	Pulm: CTA B/L  	CV: S1S2  	Abd: Soft, +BS  	Ext: No LE edema B/L                      Neuro: Awake   	Skin: Warm and Dry     LABS/STUDIES  --------------------------------------------------------------------------------    132  |  97  |  78  ----------------------------<  315      [08-21-19 @ 06:52]  4.1   |  20  |  2.02        Ca     9.7     [08-21-19 @ 06:52]    Creatinine Trend:  SCr 2.02 [08-21 @ 06:52]  SCr 1.89 [08-20 @ 07:17]  SCr 2.03 [08-19 @ 05:57]  SCr 1.92 [08-18 @ 06:00]  SCr 1.86 [08-17 @ 05:34]        Iron 42, TIBC 348, %sat 12      [07-05-19 @ 22:32]  Ferritin 130      [07-05-19 @ 22:32]  .4 (Ca --)      [04-25-19 @ 05:45]   --  PTH 43.55 (Ca --)      [09-10-18 @ 07:15]   --  PTH 36.60 (Ca --)      [09-08-18 @ 03:15]   --  Vitamin D (25OH) 25.9      [05-01-19 @ 04:00]  HbA1c 7.4      [07-05-19 @ 22:32]  TSH 2.00      [07-05-19 @ 22:32]  Lipid: chol 148, , HDL 35,       [06-01-19 @ 08:00]

## 2019-08-21 NOTE — PROGRESS NOTE ADULT - SUBJECTIVE AND OBJECTIVE BOX
Chief complaint  Patient is a 73y old  Female who presents with a chief complaint of Hypoxia, SOB (21 Aug 2019 08:57)   Review of systems  Patient in bed, looks comfortable, no fever, no hypoglycemia.    Labs and Fingersticks  CAPILLARY BLOOD GLUCOSE      POCT Blood Glucose.: 429 mg/dL (21 Aug 2019 11:38)  POCT Blood Glucose.: 428 mg/dL (21 Aug 2019 11:37)  POCT Blood Glucose.: 329 mg/dL (21 Aug 2019 07:39)  POCT Blood Glucose.: 295 mg/dL (20 Aug 2019 21:16)  POCT Blood Glucose.: 316 mg/dL (20 Aug 2019 16:24)      Anion Gap, Serum: 15 (08-21 @ 06:52)  Anion Gap, Serum: 15 (08-20 @ 07:17)      Calcium, Total Serum: 9.7 (08-21 @ 06:52)  Calcium, Total Serum: 10.3 (08-20 @ 07:17)          08-21    132<L>  |  97  |  78<H>  ----------------------------<  315<H>  4.1   |  20<L>  |  2.02<H>    Ca    9.7      21 Aug 2019 06:52      Medications  MEDICATIONS  (STANDING):  acetaminophen  IVPB .. 1000 milliGRAM(s) IV Intermittent once  aspirin enteric coated 81 milliGRAM(s) Oral daily  atorvastatin 40 milliGRAM(s) Oral at bedtime  buMETAnide 3 milliGRAM(s) Oral every 12 hours  chlorhexidine 2% Cloths 1 Application(s) Topical daily  clopidogrel Tablet 75 milliGRAM(s) Oral daily  dextrose 5%. 1000 milliLiter(s) (50 mL/Hr) IV Continuous <Continuous>  dextrose 50% Injectable 12.5 Gram(s) IV Push once  dextrose 50% Injectable 25 Gram(s) IV Push once  dextrose 50% Injectable 25 Gram(s) IV Push once  docusate sodium 100 milliGRAM(s) Oral two times a day  heparin  Injectable 5000 Unit(s) SubCutaneous every 12 hours  hydrALAZINE 50 milliGRAM(s) Oral every 8 hours  insulin lispro (HumaLOG) corrective regimen sliding scale   SubCutaneous three times a day before meals  insulin lispro (HumaLOG) corrective regimen sliding scale   SubCutaneous at bedtime  insulin lispro Injectable (HumaLOG) 15 Unit(s) SubCutaneous three times a day with meals  insulin NPH/regular 70/30 Injectable 70 Unit(s) SubCutaneous before breakfast  insulin NPH/regular 70/30 Injectable 60 Unit(s) SubCutaneous before dinner  isosorbide   dinitrate Tablet (ISORDIL) 30 milliGRAM(s) Oral three times a day  levothyroxine 50 MICROGram(s) Oral daily  lidocaine   Patch 1 Patch Transdermal daily  melatonin 3 milliGRAM(s) Oral at bedtime  metoprolol succinate ER 25 milliGRAM(s) Oral daily  montelukast 10 milliGRAM(s) Oral daily  Nephro-enrico 1 Tablet(s) Oral daily  pantoprazole    Tablet 40 milliGRAM(s) Oral before breakfast  polyethylene glycol 3350 17 Gram(s) Oral daily  senna 2 Tablet(s) Oral at bedtime  spironolactone 25 milliGRAM(s) Oral daily      Physical Exam  General: Patient comfortable in bed  Vital Signs Last 12 Hrs  T(F): 97.7 (08-21-19 @ 11:58), Max: 98 (08-21-19 @ 04:04)  HR: 88 (08-21-19 @ 11:58) (88 - 97)  BP: 126/65 (08-21-19 @ 11:58) (115/70 - 126/65)  BP(mean): --  RR: 18 (08-21-19 @ 11:58) (18 - 18)  SpO2: 99% (08-21-19 @ 11:58) (98% - 99%)  Neck: No palpable thyroid nodules.  CVS: S1S2, No murmurs  Respiratory: No wheezing, no crepitations  GI: Abdomen soft, bowel sounds positive  Musculoskeletal:  edema lower extremities.   Skin: No skin rashes, no ecchymosis    Diagnostics    Cortisol AM, Serum: AM Sched. Collection: 18-Aug-2019 06:00 (08-17 @ 12:14)  Cortisol AM, Serum: AM Sched. Collection: 19-Aug-2019 06:00 (08-18 @ 12:59)

## 2019-08-21 NOTE — PROGRESS NOTE ADULT - ASSESSMENT
Assessment  DMT2: 73y Female with DM T2 with hyperglycemia on insulin, blood sugars remain elevated in spite of current insulin regimen (NPH 70/30 70 units before breakfast and 60u before dinner, as well as Humalog 10 units before meals), she is eating partial meals.   CAD: S/P cabg On medications, stable, monitored.  Hypothyroidism:  continue synthroid 50 mcg po daily, asymptomatic.  HTN: Controlled, On med.  HLD; on statin  CKD: Monitor labs/BMP,         Plan:   DMT2:  Will increase pre-meal Humalog to 15u before each meal, and continue mixed insulin dose at NPH 70/30 70 units before breakfast and 60u before dinner. Will continue monitoring FS, log, and follow up.   CAD: S/P cabg On medications, stable, monitored.  Hypothyroidism: Continue synthroid 50 mcg po daily, asymptomatic. F/U with TFTs as outpatient  HTN: Continue treatment, monitoring, Primary team FU  HLD; on statin  CKD: Continue monitoring labs, renal FU

## 2019-08-21 NOTE — PROGRESS NOTE ADULT - SUBJECTIVE AND OBJECTIVE BOX
CHIEF COMPLAINT:    SUBJECTIVE:     REVIEW OF SYSTEMS:    CONSTITUTIONAL: (  )  weakness,  (  ) fevers or chills  EYES/ENT: (  )visual changes;     NECK: (  ) pain or stiffness  RESPIRATORY:   (  )cough, wheezing, hemoptysis;  (  ) shortness of breath  CARDIOVASCULAR:  (  )chest pain or palpitations  GASTROINTESTINAL:   (  )abdominal or epigastric pain.  (  ) nausea, vomiting, or hematemesis;   (   ) diarrhea or constipation.   GENITOURINARY:   (    ) dysuria, frequency or hematuria  NEUROLOGICAL:  (   ) numbness or weakness   All other review of systems is negative unless indicated above    Vital Signs Last 24 Hrs  T(C): 36.5 (21 Aug 2019 11:58), Max: 36.8 (20 Aug 2019 19:57)  T(F): 97.7 (21 Aug 2019 11:58), Max: 98.2 (20 Aug 2019 19:57)  HR: 89 (21 Aug 2019 17:22) (85 - 97)  BP: 100/60 (21 Aug 2019 17:22) (100/60 - 126/65)  BP(mean): --  RR: 18 (21 Aug 2019 17:22) (18 - 18)  SpO2: 97% (21 Aug 2019 17:22) (97% - 99%)    I&O's Summary    20 Aug 2019 07:01  -  21 Aug 2019 07:00  --------------------------------------------------------  IN: 600 mL / OUT: 900 mL / NET: -300 mL    21 Aug 2019 07:01  -  21 Aug 2019 18:11  --------------------------------------------------------  IN: 480 mL / OUT: 550 mL / NET: -70 mL        CAPILLARY BLOOD GLUCOSE      POCT Blood Glucose.: 450 mg/dL (21 Aug 2019 17:47)  POCT Blood Glucose.: 437 mg/dL (21 Aug 2019 17:46)  POCT Blood Glucose.: 485 mg/dL (21 Aug 2019 16:29)  POCT Blood Glucose.: 435 mg/dL (21 Aug 2019 16:29)  POCT Blood Glucose.: 493 mg/dL (21 Aug 2019 14:22)  POCT Blood Glucose.: 469 mg/dL (21 Aug 2019 14:22)  POCT Blood Glucose.: 429 mg/dL (21 Aug 2019 11:38)  POCT Blood Glucose.: 428 mg/dL (21 Aug 2019 11:37)  POCT Blood Glucose.: 329 mg/dL (21 Aug 2019 07:39)  POCT Blood Glucose.: 295 mg/dL (20 Aug 2019 21:16)      PHYSICAL EXAM:    Constitutional:  (   ) NAD,   (   )awake and alert  HEENT: PERR, EOMI,    Neck: Soft and supple, No LAD, No JVD  Respiratory:  (    Breath sounds are clear bilaterally,    (   ) wheezing, rales or rhonchi  Cardiovascular:     (   )S1 and S2, regular rate and rhythm, no Murmurs, gallops or rubs  Gastrointestinal:  (   )Bowel Sounds present, soft,   (  )nontender, nondistended,    Extremities:    (  ) peripheral edema  Vascular: 2+ peripheral pulses  Neurological:    (    )A/O x 3,   (  ) focal deficits  Musculoskeletal:    (   )  normal strength b/l upper  (     ) normal  lower extremities  Skin: No rashes    MEDICATIONS:  MEDICATIONS  (STANDING):  acetaminophen  IVPB .. 1000 milliGRAM(s) IV Intermittent once  aspirin enteric coated 81 milliGRAM(s) Oral daily  atorvastatin 40 milliGRAM(s) Oral at bedtime  buMETAnide 3 milliGRAM(s) Oral every 12 hours  chlorhexidine 2% Cloths 1 Application(s) Topical daily  clopidogrel Tablet 75 milliGRAM(s) Oral daily  colchicine 0.6 milliGRAM(s) Oral daily  dextrose 5%. 1000 milliLiter(s) (50 mL/Hr) IV Continuous <Continuous>  dextrose 50% Injectable 12.5 Gram(s) IV Push once  dextrose 50% Injectable 25 Gram(s) IV Push once  dextrose 50% Injectable 25 Gram(s) IV Push once  docusate sodium 100 milliGRAM(s) Oral two times a day  heparin  Injectable 5000 Unit(s) SubCutaneous every 12 hours  hydrALAZINE 50 milliGRAM(s) Oral every 8 hours  insulin lispro (HumaLOG) corrective regimen sliding scale   SubCutaneous three times a day before meals  insulin lispro (HumaLOG) corrective regimen sliding scale   SubCutaneous at bedtime  insulin lispro Injectable (HumaLOG) 24 Unit(s) SubCutaneous three times a day before meals  insulin NPH/regular 70/30 Injectable 70 Unit(s) SubCutaneous before breakfast  insulin NPH/regular 70/30 Injectable 60 Unit(s) SubCutaneous before dinner  isosorbide   dinitrate Tablet (ISORDIL) 30 milliGRAM(s) Oral three times a day  levothyroxine 50 MICROGram(s) Oral daily  lidocaine   Patch 1 Patch Transdermal daily  melatonin 3 milliGRAM(s) Oral at bedtime  metoprolol succinate ER 25 milliGRAM(s) Oral daily  montelukast 10 milliGRAM(s) Oral daily  Nephro-enrico 1 Tablet(s) Oral daily  pantoprazole    Tablet 40 milliGRAM(s) Oral before breakfast  polyethylene glycol 3350 17 Gram(s) Oral daily  senna 2 Tablet(s) Oral at bedtime  spironolactone 25 milliGRAM(s) Oral daily      LABS: All Labs Reviewed:    08-21    132<L>  |  97  |  78<H>  ----------------------------<  315<H>  4.1   |  20<L>  |  2.02<H>    Ca    9.7      21 Aug 2019 06:52            Blood Culture:   Urine Culture      RADIOLOGY/EKG:    ASSESSMENT AND PLAN:    DVT PPX:    ADVANCED DIRECTIVE:    DISPOSITION: CHIEF COMPLAINT:  patient awake verbal laying in the bed without S OB but is still complaining of weakness.  ,  her blood sugar still elevated was more than 450 in spite of changing Lantus  to NPH 70/30.  discussed with the medical team and endocrinologist patient may need to be on insulin drip secondary to resistant hyperglycemia    SUBJECTIVE:     REVIEW OF SYSTEMS:    CONSTITUTIONAL: ( x )  weakness,  (  ) fevers or chills  EYES/ENT: (  )visual changes;     NECK: (  ) pain or stiffness  RESPIRATORY:   (  )cough, wheezing, hemoptysis;  ( x ) shortness of breath  CARDIOVASCULAR:  (  )chest pain or palpitations  GASTROINTESTINAL:   (  )abdominal or epigastric pain.  (  ) nausea, vomiting, or hematemesis;   (   ) diarrhea or constipation.   GENITOURINARY:   (    ) dysuria, frequency or hematuria  NEUROLOGICAL:  (   ) numbness or weakness   All other review of systems is negative unless indicated above    Vital Signs Last 24 Hrs  T(C): 36.5 (21 Aug 2019 11:58), Max: 36.8 (20 Aug 2019 19:57)  T(F): 97.7 (21 Aug 2019 11:58), Max: 98.2 (20 Aug 2019 19:57)  HR: 89 (21 Aug 2019 17:22) (85 - 97)  BP: 100/60 (21 Aug 2019 17:22) (100/60 - 126/65)  BP(mean): --  RR: 18 (21 Aug 2019 17:22) (18 - 18)  SpO2: 97% (21 Aug 2019 17:22) (97% - 99%)    I&O's Summary    20 Aug 2019 07:01  -  21 Aug 2019 07:00  --------------------------------------------------------  IN: 600 mL / OUT: 900 mL / NET: -300 mL    21 Aug 2019 07:01  -  21 Aug 2019 18:11  --------------------------------------------------------  IN: 480 mL / OUT: 550 mL / NET: -70 mL        CAPILLARY BLOOD GLUCOSE      POCT Blood Glucose.: 450 mg/dL (21 Aug 2019 17:47)  POCT Blood Glucose.: 437 mg/dL (21 Aug 2019 17:46)  POCT Blood Glucose.: 485 mg/dL (21 Aug 2019 16:29)  POCT Blood Glucose.: 435 mg/dL (21 Aug 2019 16:29)  POCT Blood Glucose.: 493 mg/dL (21 Aug 2019 14:22)  POCT Blood Glucose.: 469 mg/dL (21 Aug 2019 14:22)  POCT Blood Glucose.: 429 mg/dL (21 Aug 2019 11:38)  POCT Blood Glucose.: 428 mg/dL (21 Aug 2019 11:37)  POCT Blood Glucose.: 329 mg/dL (21 Aug 2019 07:39)  POCT Blood Glucose.: 295 mg/dL (20 Aug 2019 21:16)      PHYSICAL EXAM:  Constitutional:  ( x  ) NAD,   (  x )awake and alert  HEENT: PERR, EOMI,    Neck: Soft and supple, No LAD, No JVD  Respiratory:  ( x   Breath sounds are clear bilaterally,    (   ) wheezing, rales or rhonchi  Cardiovascular:     (  x )S1 and S2, r   Gastrointestinal:  (  x )Bowel Sounds present, soft,   (  )nontender, nondistended,    Extremities:    (  ) peripheral edema  Vascular: 2+ peripheral pulses  Neurological:    (  x  )A/O x 3,   (  ) focal deficits  Musculoskeletal:    (  x )  normal strength b/l upper  (     ) normal  lower extremities  Skin: No rashes         MEDICATIONS:  MEDICATIONS  (STANDING):  acetaminophen  IVPB .. 1000 milliGRAM(s) IV Intermittent once  aspirin enteric coated 81 milliGRAM(s) Oral daily  atorvastatin 40 milliGRAM(s) Oral at bedtime  buMETAnide 3 milliGRAM(s) Oral every 12 hours  chlorhexidine 2% Cloths 1 Application(s) Topical daily  clopidogrel Tablet 75 milliGRAM(s) Oral daily  colchicine 0.6 milliGRAM(s) Oral daily  dextrose 5%. 1000 milliLiter(s) (50 mL/Hr) IV Continuous <Continuous>  dextrose 50% Injectable 12.5 Gram(s) IV Push once  dextrose 50% Injectable 25 Gram(s) IV Push once  dextrose 50% Injectable 25 Gram(s) IV Push once  docusate sodium 100 milliGRAM(s) Oral two times a day  heparin  Injectable 5000 Unit(s) SubCutaneous every 12 hours  hydrALAZINE 50 milliGRAM(s) Oral every 8 hours  insulin lispro (HumaLOG) corrective regimen sliding scale   SubCutaneous three times a day before meals  insulin lispro (HumaLOG) corrective regimen sliding scale   SubCutaneous at bedtime  insulin lispro Injectable (HumaLOG) 24 Unit(s) SubCutaneous three times a day before meals  insulin NPH/regular 70/30 Injectable 70 Unit(s) SubCutaneous before breakfast  insulin NPH/regular 70/30 Injectable 60 Unit(s) SubCutaneous before dinner  isosorbide   dinitrate Tablet (ISORDIL) 30 milliGRAM(s) Oral three times a day  levothyroxine 50 MICROGram(s) Oral daily  lidocaine   Patch 1 Patch Transdermal daily  melatonin 3 milliGRAM(s) Oral at bedtime  metoprolol succinate ER 25 milliGRAM(s) Oral daily  montelukast 10 milliGRAM(s) Oral daily  Nephro-enrico 1 Tablet(s) Oral daily  pantoprazole    Tablet 40 milliGRAM(s) Oral before breakfast  polyethylene glycol 3350 17 Gram(s) Oral daily  senna 2 Tablet(s) Oral at bedtime  spironolactone 25 milliGRAM(s) Oral daily      LABS: All Labs Reviewed:    08-21    132<L>  |  97  |  78<H>  ----------------------------<  315<H>  4.1   |  20<L>  |  2.02<H>    Ca    9.7      21 Aug 2019 06:52            Blood Culture:   Urine Culture      RADIOLOGY/EKG:    ASSESSMENT AND PLAN:  74 yo woman with PMHx of HTN, T2DM (A1c 7.4%), CAD s/p CABG (LIMA to LAD, SVG to OM, SVG to PDA 2014 at [facility]), non-dilated ICM (EF 20-25%), severe mitral regurgitation s/p mitral clip (6/13/19 Dr. Newman), severe pulm HTN, CKD, hypothyroidism, who is presenting to ED after experiencing worsening SOB and intermittent [facility]. Of note, pt was recently discharged on 6/19 after having mitral clip procedure completed. She initially required BiPAP with improvement in her respiratory status following diuresis. Initiated on vasopressors and inotropes in CCU, which were subsequently weaned off. Infectious work up was negative.    #HFrEF likely 2/2 ICM with MR s/p alonso clip  -  continue with Bumex 3 mg twice daily   discuss with cardiology      -    - continue hydralazine 50 mg TID and isordil 30 TID  - continue spironolactone 25 mg  - continue Metoprolol Succinate 25 mg PO Daily   - No further cardiac testing at this time.      #CAD  - continue ASA and statin   - Pt with chest pain, but also with end stage cardiomyopathy, coronary disease, had MitraClip and no further intervention feasible. She should not have trops checked.     #SVT - likely Atrial Tach vs. Atrial Flutter   - Amio initially not given due to elevated LFTs likely in the setting of congestion.   - recheck LFTs.   - Monitor on tele.   - continue metoprolol at current dose as above  - If pt has SVT again can consider amio   #diabetes  was on  66 units Lantus at bedtime and 52 unit  Lantus in the morning  which is not effective   on   NPH 7030  70 units before breakfast and 60units before dinner  her hyperglycemia is worse patient needs to be on insulin drip discussed with endocrinologist if possible on the floor otherwise will contact the MICU in a.m.  Cushing's syndrome was ruled out  DVT PPX:    ADVANCED DIRECTIVE:    DISPOSITION:

## 2019-08-22 LAB
ALBUMIN SERPL ELPH-MCNC: 4.5 G/DL — SIGNIFICANT CHANGE UP (ref 3.3–5)
ALP SERPL-CCNC: 101 U/L — SIGNIFICANT CHANGE UP (ref 40–120)
ALT FLD-CCNC: 20 U/L — SIGNIFICANT CHANGE UP (ref 10–45)
ANION GAP SERPL CALC-SCNC: 17 MMOL/L — SIGNIFICANT CHANGE UP (ref 5–17)
ANION GAP SERPL CALC-SCNC: 18 MMOL/L — HIGH (ref 5–17)
AST SERPL-CCNC: 21 U/L — SIGNIFICANT CHANGE UP (ref 10–40)
B-OH-BUTYR SERPL-SCNC: 0.1 MMOL/L — SIGNIFICANT CHANGE UP
BASE EXCESS BLDA CALC-SCNC: -1.9 MMOL/L — SIGNIFICANT CHANGE UP (ref -2–2)
BILIRUB SERPL-MCNC: 0.2 MG/DL — SIGNIFICANT CHANGE UP (ref 0.2–1.2)
BUN SERPL-MCNC: 85 MG/DL — HIGH (ref 7–23)
BUN SERPL-MCNC: 87 MG/DL — HIGH (ref 7–23)
CALCIUM SERPL-MCNC: 10.3 MG/DL — SIGNIFICANT CHANGE UP (ref 8.4–10.5)
CALCIUM SERPL-MCNC: 9.6 MG/DL — SIGNIFICANT CHANGE UP (ref 8.4–10.5)
CHLORIDE SERPL-SCNC: 93 MMOL/L — LOW (ref 96–108)
CHLORIDE SERPL-SCNC: 98 MMOL/L — SIGNIFICANT CHANGE UP (ref 96–108)
CO2 BLDA-SCNC: 23 MMOL/L — SIGNIFICANT CHANGE UP (ref 22–30)
CO2 SERPL-SCNC: 19 MMOL/L — LOW (ref 22–31)
CO2 SERPL-SCNC: 20 MMOL/L — LOW (ref 22–31)
CREAT SERPL-MCNC: 1.93 MG/DL — HIGH (ref 0.5–1.3)
CREAT SERPL-MCNC: 2.04 MG/DL — HIGH (ref 0.5–1.3)
GAS PNL BLDA: SIGNIFICANT CHANGE UP
GLUCOSE BLDC GLUCOMTR-MCNC: 233 MG/DL — HIGH (ref 70–99)
GLUCOSE BLDC GLUCOMTR-MCNC: 319 MG/DL — HIGH (ref 70–99)
GLUCOSE BLDC GLUCOMTR-MCNC: 353 MG/DL — HIGH (ref 70–99)
GLUCOSE BLDC GLUCOMTR-MCNC: 359 MG/DL — HIGH (ref 70–99)
GLUCOSE BLDC GLUCOMTR-MCNC: 371 MG/DL — HIGH (ref 70–99)
GLUCOSE BLDC GLUCOMTR-MCNC: 383 MG/DL — HIGH (ref 70–99)
GLUCOSE BLDC GLUCOMTR-MCNC: 385 MG/DL — HIGH (ref 70–99)
GLUCOSE BLDC GLUCOMTR-MCNC: 391 MG/DL — HIGH (ref 70–99)
GLUCOSE BLDC GLUCOMTR-MCNC: 393 MG/DL — HIGH (ref 70–99)
GLUCOSE BLDC GLUCOMTR-MCNC: 396 MG/DL — HIGH (ref 70–99)
GLUCOSE SERPL-MCNC: 267 MG/DL — HIGH (ref 70–99)
GLUCOSE SERPL-MCNC: 367 MG/DL — HIGH (ref 70–99)
HCO3 BLDA-SCNC: 22 MMOL/L — SIGNIFICANT CHANGE UP (ref 21–29)
HCT VFR BLD CALC: 27.4 % — LOW (ref 34.5–45)
HGB BLD-MCNC: 9 G/DL — LOW (ref 11.5–15.5)
MAGNESIUM SERPL-MCNC: 2.3 MG/DL — SIGNIFICANT CHANGE UP (ref 1.6–2.6)
MCHC RBC-ENTMCNC: 28.6 PG — SIGNIFICANT CHANGE UP (ref 27–34)
MCHC RBC-ENTMCNC: 32.8 GM/DL — SIGNIFICANT CHANGE UP (ref 32–36)
MCV RBC AUTO: 87 FL — SIGNIFICANT CHANGE UP (ref 80–100)
PCO2 BLDA: 34 MMHG — SIGNIFICANT CHANGE UP (ref 32–46)
PH BLDA: 7.42 — SIGNIFICANT CHANGE UP (ref 7.35–7.45)
PHOSPHATE SERPL-MCNC: 3.9 MG/DL — SIGNIFICANT CHANGE UP (ref 2.5–4.5)
PLATELET # BLD AUTO: 361 K/UL — SIGNIFICANT CHANGE UP (ref 150–400)
PO2 BLDA: 45 MMHG — CRITICAL LOW (ref 74–108)
POTASSIUM SERPL-MCNC: 3.9 MMOL/L — SIGNIFICANT CHANGE UP (ref 3.5–5.3)
POTASSIUM SERPL-MCNC: 4.1 MMOL/L — SIGNIFICANT CHANGE UP (ref 3.5–5.3)
POTASSIUM SERPL-SCNC: 3.9 MMOL/L — SIGNIFICANT CHANGE UP (ref 3.5–5.3)
POTASSIUM SERPL-SCNC: 4.1 MMOL/L — SIGNIFICANT CHANGE UP (ref 3.5–5.3)
PROT SERPL-MCNC: 8.9 G/DL — HIGH (ref 6–8.3)
RBC # BLD: 3.15 M/UL — LOW (ref 3.8–5.2)
RBC # FLD: 15.9 % — HIGH (ref 10.3–14.5)
SAO2 % BLDA: 77 % — LOW (ref 92–96)
SODIUM SERPL-SCNC: 131 MMOL/L — LOW (ref 135–145)
SODIUM SERPL-SCNC: 134 MMOL/L — LOW (ref 135–145)
WBC # BLD: 9.61 K/UL — SIGNIFICANT CHANGE UP (ref 3.8–10.5)
WBC # FLD AUTO: 9.61 K/UL — SIGNIFICANT CHANGE UP (ref 3.8–10.5)

## 2019-08-22 PROCEDURE — 99222 1ST HOSP IP/OBS MODERATE 55: CPT | Mod: GC

## 2019-08-22 PROCEDURE — 99221 1ST HOSP IP/OBS SF/LOW 40: CPT

## 2019-08-22 RX ORDER — ALPRAZOLAM 0.25 MG
0.25 TABLET ORAL THREE TIMES A DAY
Refills: 0 | Status: DISCONTINUED | OUTPATIENT
Start: 2019-08-22 | End: 2019-08-29

## 2019-08-22 RX ORDER — INSULIN HUMAN 100 [IU]/ML
8 INJECTION, SOLUTION SUBCUTANEOUS ONCE
Refills: 0 | Status: COMPLETED | OUTPATIENT
Start: 2019-08-22 | End: 2019-08-22

## 2019-08-22 RX ORDER — INSULIN NPH HUM/REG INSULIN HM 70-30/ML
76 VIAL (ML) SUBCUTANEOUS
Refills: 0 | Status: DISCONTINUED | OUTPATIENT
Start: 2019-08-23 | End: 2019-08-24

## 2019-08-22 RX ORDER — SODIUM CHLORIDE 9 MG/ML
1000 INJECTION, SOLUTION INTRAVENOUS
Refills: 0 | Status: DISCONTINUED | OUTPATIENT
Start: 2019-08-22 | End: 2019-09-05

## 2019-08-22 RX ORDER — INSULIN HUMAN 100 [IU]/ML
5 INJECTION, SOLUTION SUBCUTANEOUS ONCE
Refills: 0 | Status: COMPLETED | OUTPATIENT
Start: 2019-08-22 | End: 2019-08-22

## 2019-08-22 RX ORDER — INSULIN NPH HUM/REG INSULIN HM 70-30/ML
68 VIAL (ML) SUBCUTANEOUS
Refills: 0 | Status: DISCONTINUED | OUTPATIENT
Start: 2019-08-22 | End: 2019-08-24

## 2019-08-22 RX ORDER — INSULIN LISPRO 100/ML
28 VIAL (ML) SUBCUTANEOUS
Refills: 0 | Status: DISCONTINUED | OUTPATIENT
Start: 2019-08-22 | End: 2019-08-23

## 2019-08-22 RX ADMIN — INSULIN HUMAN 5 UNIT(S): 100 INJECTION, SOLUTION SUBCUTANEOUS at 11:56

## 2019-08-22 RX ADMIN — ATORVASTATIN CALCIUM 40 MILLIGRAM(S): 80 TABLET, FILM COATED ORAL at 21:40

## 2019-08-22 RX ADMIN — Medication 25 MILLIGRAM(S): at 05:18

## 2019-08-22 RX ADMIN — Medication 28 UNIT(S): at 13:08

## 2019-08-22 RX ADMIN — TRAMADOL HYDROCHLORIDE 25 MILLIGRAM(S): 50 TABLET ORAL at 23:43

## 2019-08-22 RX ADMIN — Medication 81 MILLIGRAM(S): at 11:58

## 2019-08-22 RX ADMIN — ISOSORBIDE DINITRATE 30 MILLIGRAM(S): 5 TABLET ORAL at 13:32

## 2019-08-22 RX ADMIN — ISOSORBIDE DINITRATE 30 MILLIGRAM(S): 5 TABLET ORAL at 05:18

## 2019-08-22 RX ADMIN — Medication 0.25 MILLIGRAM(S): at 00:32

## 2019-08-22 RX ADMIN — BUMETANIDE 3 MILLIGRAM(S): 0.25 INJECTION INTRAMUSCULAR; INTRAVENOUS at 18:21

## 2019-08-22 RX ADMIN — Medication 1 TABLET(S): at 11:58

## 2019-08-22 RX ADMIN — INSULIN HUMAN 8 UNIT(S): 100 INJECTION, SOLUTION SUBCUTANEOUS at 20:13

## 2019-08-22 RX ADMIN — Medication 4: at 08:21

## 2019-08-22 RX ADMIN — Medication 0.25 MILLIGRAM(S): at 21:45

## 2019-08-22 RX ADMIN — LIDOCAINE 1 PATCH: 4 CREAM TOPICAL at 21:41

## 2019-08-22 RX ADMIN — LIDOCAINE 1 PATCH: 4 CREAM TOPICAL at 08:30

## 2019-08-22 RX ADMIN — Medication 2: at 13:09

## 2019-08-22 RX ADMIN — HEPARIN SODIUM 5000 UNIT(S): 5000 INJECTION INTRAVENOUS; SUBCUTANEOUS at 05:18

## 2019-08-22 RX ADMIN — SPIRONOLACTONE 25 MILLIGRAM(S): 25 TABLET, FILM COATED ORAL at 05:18

## 2019-08-22 RX ADMIN — Medication 28 UNIT(S): at 17:42

## 2019-08-22 RX ADMIN — CLOPIDOGREL BISULFATE 75 MILLIGRAM(S): 75 TABLET, FILM COATED ORAL at 11:58

## 2019-08-22 RX ADMIN — Medication 50 MICROGRAM(S): at 05:18

## 2019-08-22 RX ADMIN — BUMETANIDE 3 MILLIGRAM(S): 0.25 INJECTION INTRAMUSCULAR; INTRAVENOUS at 05:18

## 2019-08-22 RX ADMIN — MONTELUKAST 10 MILLIGRAM(S): 4 TABLET, CHEWABLE ORAL at 11:58

## 2019-08-22 RX ADMIN — Medication 68 UNIT(S): at 17:41

## 2019-08-22 RX ADMIN — Medication 3 MILLIGRAM(S): at 21:40

## 2019-08-22 RX ADMIN — PANTOPRAZOLE SODIUM 40 MILLIGRAM(S): 20 TABLET, DELAYED RELEASE ORAL at 05:19

## 2019-08-22 RX ADMIN — HEPARIN SODIUM 5000 UNIT(S): 5000 INJECTION INTRAVENOUS; SUBCUTANEOUS at 18:20

## 2019-08-22 RX ADMIN — SODIUM CHLORIDE 75 MILLILITER(S): 9 INJECTION, SOLUTION INTRAVENOUS at 20:32

## 2019-08-22 RX ADMIN — Medication 50 MILLIGRAM(S): at 21:40

## 2019-08-22 RX ADMIN — Medication 70 UNIT(S): at 08:25

## 2019-08-22 RX ADMIN — Medication 0.6 MILLIGRAM(S): at 11:58

## 2019-08-22 RX ADMIN — ISOSORBIDE DINITRATE 30 MILLIGRAM(S): 5 TABLET ORAL at 21:40

## 2019-08-22 RX ADMIN — Medication 24 UNIT(S): at 08:23

## 2019-08-22 RX ADMIN — Medication 50 MILLIGRAM(S): at 13:32

## 2019-08-22 RX ADMIN — Medication 50 MILLIGRAM(S): at 05:18

## 2019-08-22 RX ADMIN — Medication 3: at 22:18

## 2019-08-22 RX ADMIN — POLYETHYLENE GLYCOL 3350 17 GRAM(S): 17 POWDER, FOR SOLUTION ORAL at 11:58

## 2019-08-22 RX ADMIN — Medication 5: at 17:42

## 2019-08-22 NOTE — PROGRESS NOTE ADULT - SUBJECTIVE AND OBJECTIVE BOX
Chief complaint  Patient is a 73y old  Female who presents with a chief complaint of Hypoxia, SOB (22 Aug 2019 13:36)   Review of systems  Patient in bed, looks comfortable, no fever, no hypoglycemia.    Labs and Fingersticks  CAPILLARY BLOOD GLUCOSE      POCT Blood Glucose.: 233 mg/dL (22 Aug 2019 13:02)  POCT Blood Glucose.: 353 mg/dL (22 Aug 2019 11:36)  POCT Blood Glucose.: 393 mg/dL (22 Aug 2019 10:25)  POCT Blood Glucose.: 319 mg/dL (22 Aug 2019 07:41)  POCT Blood Glucose.: 383 mg/dL (21 Aug 2019 21:34)  POCT Blood Glucose.: 450 mg/dL (21 Aug 2019 17:47)  POCT Blood Glucose.: 437 mg/dL (21 Aug 2019 17:46)  POCT Blood Glucose.: 485 mg/dL (21 Aug 2019 16:29)  POCT Blood Glucose.: 435 mg/dL (21 Aug 2019 16:29)      Anion Gap, Serum: 18 <H> (08-22 @ 11:47)  Anion Gap, Serum: 17 (08-22 @ 05:59)  Anion Gap, Serum: 15 (08-21 @ 06:52)      Calcium, Total Serum: 10.3 (08-22 @ 11:47)  Calcium, Total Serum: 9.6 (08-22 @ 05:59)  Calcium, Total Serum: 9.7 (08-21 @ 06:52)  Albumin, Serum: 4.5 (08-22 @ 11:47)    Alanine Aminotransferase (ALT/SGPT): 20 (08-22 @ 11:47)  Alkaline Phosphatase, Serum: 101 (08-22 @ 11:47)  Aspartate Aminotransferase (AST/SGOT): 21 (08-22 @ 11:47)        08-22    131<L>  |  93<L>  |  85<H>  ----------------------------<  367<H>  4.1   |  20<L>  |  2.04<H>    Ca    10.3      22 Aug 2019 11:47  Phos  3.9     08-22  Mg     2.3     08-22    TPro  8.9<H>  /  Alb  4.5  /  TBili  0.2  /  DBili  x   /  AST  21  /  ALT  20  /  AlkPhos  101  08-22                        9.0    9.61  )-----------( 361      ( 22 Aug 2019 08:14 )             27.4     Medications  MEDICATIONS  (STANDING):  acetaminophen  IVPB .. 1000 milliGRAM(s) IV Intermittent once  aspirin enteric coated 81 milliGRAM(s) Oral daily  atorvastatin 40 milliGRAM(s) Oral at bedtime  buMETAnide 3 milliGRAM(s) Oral every 12 hours  chlorhexidine 2% Cloths 1 Application(s) Topical daily  clopidogrel Tablet 75 milliGRAM(s) Oral daily  colchicine 0.6 milliGRAM(s) Oral daily  dextrose 5%. 1000 milliLiter(s) (50 mL/Hr) IV Continuous <Continuous>  dextrose 50% Injectable 12.5 Gram(s) IV Push once  dextrose 50% Injectable 25 Gram(s) IV Push once  dextrose 50% Injectable 25 Gram(s) IV Push once  docusate sodium 100 milliGRAM(s) Oral two times a day  heparin  Injectable 5000 Unit(s) SubCutaneous every 12 hours  hydrALAZINE 50 milliGRAM(s) Oral every 8 hours  insulin lispro (HumaLOG) corrective regimen sliding scale   SubCutaneous three times a day before meals  insulin lispro (HumaLOG) corrective regimen sliding scale   SubCutaneous at bedtime  insulin lispro Injectable (HumaLOG) 28 Unit(s) SubCutaneous three times a day before meals  insulin NPH/regular 70/30 Injectable 76 Unit(s) SubCutaneous before breakfast  insulin NPH/regular 70/30 Injectable 68 Unit(s) SubCutaneous before dinner  isosorbide   dinitrate Tablet (ISORDIL) 30 milliGRAM(s) Oral three times a day  levothyroxine 50 MICROGram(s) Oral daily  lidocaine   Patch 1 Patch Transdermal daily  melatonin 3 milliGRAM(s) Oral at bedtime  metoprolol succinate ER 25 milliGRAM(s) Oral daily  montelukast 10 milliGRAM(s) Oral daily  Nephro-enrico 1 Tablet(s) Oral daily  pantoprazole    Tablet 40 milliGRAM(s) Oral before breakfast  polyethylene glycol 3350 17 Gram(s) Oral daily  senna 2 Tablet(s) Oral at bedtime  spironolactone 25 milliGRAM(s) Oral daily      Physical Exam  General: Patient comfortable in bed  Vital Signs Last 12 Hrs  T(F): 97.8 (08-22-19 @ 11:33), Max: 97.8 (08-22-19 @ 11:33)  HR: 87 (08-22-19 @ 13:31) (79 - 87)  BP: 118/68 (08-22-19 @ 13:31) (97/60 - 118/68)  BP(mean): --  RR: 18 (08-22-19 @ 13:31) (18 - 20)  SpO2: 97% (08-22-19 @ 13:31) (97% - 100%)  Neck: No palpable thyroid nodules.  CVS: S1S2, No murmurs  Respiratory: No wheezing, no crepitations  GI: Abdomen soft, bowel sounds positive  Musculoskeletal:  edema lower extremities.   Skin: No skin rashes, no ecchymosis    Diagnostics    Cortisol AM, Serum: AM Sched. Collection: 18-Aug-2019 06:00 (08-17 @ 12:14)  Cortisol AM, Serum: AM Sched. Collection: 19-Aug-2019 06:00 (08-18 @ 12:59)

## 2019-08-22 NOTE — CONSULT NOTE ADULT - ASSESSMENT
Assessment:  73 yr old female with PMHx of DM2, HTN, CAD s/p CABG (2014), sever MR s/p MR clip (6/13/19)  HFrEF (21% 6/15/19) severe PulmHTN (PAS 70 4/26/19), hypothyroidsim, CKD who originally presented to [facility] 7/5/19 with ADHF requiring CCU admission, treated with lasix/dobutrex threapy with eventual improvement and transfer to floor. General fallon course c/b episodes of hyperglycemia greater the 250's with max of 490's over past several days and development of Rt ankle pain and swelling, felt due to development of gout.  Pt has received over past 24 hrs 25 units of Lispro sliding scale with 24 units lispro premeals in addition to 70 units of 70/30 before breakfast and 60 units 70/30 before dinner. Serum HCO3 of 19 with AG of 17, Scr 1.93 (baseline 1.64 - 2.18)       Consult called for hyperglycemia.     Plan:    #Neuro:  -awake alert oriented x 2-2  -neuro checks q 4 hrs and prn for changes  -activity as tolerated    #Pulm:  -laying supine, flat utilizing Rm Air  -supplemental O2 prn to maintain SPO2 >92%  -incentive spirometry 10x q 2 hrs   -HOB >/= 30 degree angle    #CV:    #GI/:  -Pt with ADHF currently on bumex 3 mg po q 12hr and spironolactone 25 mg po qd  -currently negative 1 liter over past 24 hrs  -as pt with hyperglycemia - consider keeping I & O 's even at present    #ID:  -Obtain blood cultures now  -obtain urinalysis now    #FEN/ENDO/HEME:  -Obtain CMP/Mg++/PO--4 with beta hydroxybutyrate now  -Venous blood gas now  -Although pt being treated for ADHF currently on bumex, would hydrate with lactated Ringers at 75 cc/hr x 4 hrs to facilitate decrease in glucose (keep even as above)  -pt currently on 28 units lispro premeal with lispro  low dose sliding scale premeal with 76 units of 70/30 NPH/reg insulin before breakfast, 68 units 70/30 NPH/reg before dinner  -As NPH peaks 4 to 6 hrs and  last approx 12 hrs - , consider adding 20 units NPH qhs - would discuss with endocrine  -Pt with AGMA of 17 with serum HCO3 of 19  - acidosis is most likely from MERVIN on CKD (hydrate as above) Assessment:  73 yr old female with PMHx of DM2, HTN, CAD s/p CABG (2014), sever MR s/p MR clip (6/13/19)  HFrEF (21% 6/15/19) severe PulmHTN (PAS 70 4/26/19), hypothyroidsim, CKD who originally presented to [facility] 7/5/19 with ADHF requiring CCU admission, treated with lasix/dobutrex threapy with eventual improvement and transfer to floor. General fallon course c/b episodes of hyperglycemia greater the 250's with max of 490's over past several days and development of Rt ankle pain and swelling, felt due to development of gout.  Pt has received over past 24 hrs 25 units of Lispro sliding scale with 24 units lispro premeals in addition to 70 units of 70/30 before breakfast and 60 units 70/30 before dinner. Serum HCO3 of 19 with AG of 17, Scr 1.93 (baseline 1.64 - 2.18)       Consult called for hyperglycemia.     Plan:    #Neuro:  -awake alert oriented x 2-2  -neuro checks q 4 hrs and prn for changes  -activity as tolerated    #Pulm:  -laying supine, flat utilizing Rm Air  -supplemental O2 prn to maintain SPO2 >92%  -incentive spirometry 10x q 2 hrs   -HOB >/= 30 degree angle    #CV:    #GI/:  -Pt with ADHF currently on bumex 3 mg po q 12hr and spironolactone 25 mg po qd  -currently negative 1 liter over past 24 hrs  -as pt with hyperglycemia - consider keeping I & O 's even at present    #ID:  -Obtain blood cultures now  -obtain urinalysis now    #FEN/ENDO/HEME:  -Obtain CMP/Mg++/PO--4 with beta hydroxybutyrate now  -Venous blood gas now  -Although pt being treated for ADHF currently on bumex, would hydrate with lactated Ringers at 75 cc/hr x 4 hrs to facilitate decrease in glucose (keep even as above)  -ascertain dextrose solutions kept to minimum  -pt currently on 28 units lispro premeal with lispro  low dose sliding scale premeal with 76 units of 70/30 NPH/reg insulin before breakfast, 68 units 70/30 NPH/reg before dinner  -As NPH peaks 4 to 6 hrs and  last approx 12 hrs - , consider adding 20 units NPH qhs - would discuss with endocrine  -Pt with AGMA of 17 with serum HCO3 of 19  - acidosis is most likely from MERVIN on CKD (hydrate as above) Assessment:  73 yr old female with PMHx of DM2, HTN, CAD s/p CABG (2014), sever MR s/p MR clip (6/13/19)  HFrEF (21% 6/15/19) severe PulmHTN (PAS 70 4/26/19), hypothyroidsim, CKD who originally presented to [facility] 7/5/19 with ADHF requiring CCU admission, treated with lasix/dobutrex threapy with eventual improvement and transfer to floor. General fallon course c/b episodes of hyperglycemia greater the 250's with max of 490's over past several days and development of Rt ankle pain and swelling, felt due to development of gout.  Pt has received over past 24 hrs 25 units of Lispro sliding scale with 24 units lispro premeals in addition to 70 units of 70/30 before breakfast and 60 units 70/30 before dinner. Serum HCO3 of 19 with AG of 17, Scr 1.93 (baseline 1.64 - 2.18)       Consult called for hyperglycemia.     Plan:    #Neuro:  -awake alert oriented x 2-2  -neuro checks q 4 hrs and prn for changes  -activity as tolerated    #Pulm:  -laying supine, flat utilizing Rm Air  -supplemental O2 prn to maintain SPO2 >92%  -incentive spirometry 10x q 2 hrs   -HOB >/= 30 degree angle    #CV:  -f/u cards recs    #GI/:  -Pt with ADHF currently on bumex 3 mg po q 12hr and spironolactone 25 mg po qd  -currently negative 1 liter over past 24 hrs  -as pt with hyperglycemia - consider keeping I & O 's even at present    #ID:  -Obtain blood cultures now  -obtain urinalysis now    #FEN/ENDO/HEME:  -Obtain CMP/Mg++/PO--4 with beta hydroxybutyrate now  -Venous blood gas now  -Although pt being treated for ADHF currently on bumex, would hydrate with lactated Ringers at 75 cc/hr x 4 hrs to facilitate decrease in glucose (keep even as above)  -ascertain dextrose solutions kept to minimum  -pt currently on 28 units lispro premeal with lispro  low dose sliding scale premeal with 76 units of 70/30 NPH/reg insulin before breakfast, 68 units 70/30 NPH/reg before dinner  -As NPH peaks 4 to 6 hrs and  last approx 12 hrs - , consider adding 20 units NPH qhs - would discuss with endocrine  -Pt with AGMA of 17 with serum HCO3 of 19  - acidosis is most likely from MERVIN on CKD (hydrate as above)

## 2019-08-22 NOTE — CONSULT NOTE ADULT - SUBJECTIVE AND OBJECTIVE BOX
SELVIN CLAIRE  [id number]    HISTORY OF PRESENT ILLNESS:    72yo F with PMHx DM, HTN, HFrEF, s/p Cabg + MV clip for severe MR, CKD admitted initially for evaluation of SOB and found to have acute decompensated HF. during her admission she has been receiving diuretic therapy for her HF, intensive insulin titration for her uncontrolled DM. the team consulted rheumatology for evaluation of left foot pain and possible gout.     she reported that the pain started around 7 days ago in the left ankle, not radiated, wax-waning for the last few days. she feel that the pain is worse with moving the left ankle and when some press over the left ankle area. she had similar symptoms before in the past. also for around the same time she reported to have 1-2 right finger pain with tenderness to palpation on the fingers.    She was evaluated by rheumatology in 5/2019 for similar complains of left ankle pain during and admission for heart failure. she was found to have elevated uric acid and received treatment for gout with a prednisone taper with improvement of her symptoms.        PAST MEDICAL & SURGICAL HISTORY:  GERD (gastroesophageal reflux disease)  CAD (coronary artery disease): s/p CABG  Hypothyroidism  Hyperlipidemia  Diabetes mellitus, type 2  S/P CABG (coronary artery bypass graft)        MEDICATIONS  (STANDING):  acetaminophen  IVPB .. 1000 milliGRAM(s) IV Intermittent once  aspirin enteric coated 81 milliGRAM(s) Oral daily  atorvastatin 40 milliGRAM(s) Oral at bedtime  buMETAnide 3 milliGRAM(s) Oral every 12 hours  chlorhexidine 2% Cloths 1 Application(s) Topical daily  clopidogrel Tablet 75 milliGRAM(s) Oral daily  colchicine 0.6 milliGRAM(s) Oral daily  heparin  Injectable 5000 Unit(s) SubCutaneous every 12 hours  hydrALAZINE 50 milliGRAM(s) Oral every 8 hours  insulin NPH/regular 70/30 Injectable 76 Unit(s) SubCutaneous before breakfast  insulin NPH/regular 70/30 Injectable 68 Unit(s) SubCutaneous before dinner  isosorbide   dinitrate Tablet (ISORDIL) 30 milliGRAM(s) Oral three times a day  levothyroxine 50 MICROGram(s) Oral daily  melatonin 3 milliGRAM(s) Oral at bedtime  metoprolol succinate ER 25 milliGRAM(s) Oral daily  montelukast 10 milliGRAM(s) Oral daily  Nephro-enrico 1 Tablet(s) Oral daily  pantoprazole    Tablet 40 milliGRAM(s) Oral before breakfast  spironolactone 25 milliGRAM(s) Oral daily        Allergies    azithromycin (Hives; Pruritus)    Intolerances        PERTINENT MEDICATION HISTORY:      FAMILY HISTORY:  Family history of hypertension (Sibling): sister  Family history of diabetes mellitus (Sibling): brothers/sisters      Vital Signs Last 24 Hrs  T(C): 36.6 (22 Aug 2019 11:33), Max: 36.7 (21 Aug 2019 20:16)  T(F): 97.8 (22 Aug 2019 11:33), Max: 98.1 (21 Aug 2019 20:16)  HR: 87 (22 Aug 2019 13:31) (79 - 89)  BP: 118/68 (22 Aug 2019 13:31) (97/60 - 120/64)  BP(mean): --  RR: 18 (22 Aug 2019 13:31) (18 - 20)  SpO2: 97% (22 Aug 2019 13:31) (96% - 100%)    Physical Exam:  General: No apparent distress  HEENT: EOMI, MMM  CVS: distant heart sound no m/r/g  Resp: crackle b/l on anterior auscultation  GI: Soft, mild tenderness over the RLQ+BS  MSK: ankle symmetric, no evident erythema or edema b/l. TTP over the left ankle area. tenderness to palpation over the 1-2 finger PIP of the right hand with mild erythema over the 2 PIP.  Neuro: Alert, cooperative, AO x2 (self, hospital, year)      LABS:                        9.0    9.61  )-----------( 361      ( 22 Aug 2019 08:14 )             27.4     08-22    131<L>  |  93<L>  |  85<H>  ----------------------------<  367<H>  4.1   |  20<L>  |  2.04<H>    Ca    10.3      22 Aug 2019 11:47  Phos  3.9     08-22  Mg     2.3     08-22    TPro  8.9<H>  /  Alb  4.5  /  TBili  0.2  /  DBili  x   /  AST  21  /  ALT  20  /  AlkPhos  101  08-22      Uric Acid, Serum: 13.3 mg/dL (08.19.19 @ 05:57)        RADIOLOGY & ADDITIONAL STUDIES:    4/27 B/l foot xray    IMPRESSION:  No tracking soft tissue gas collections, radiopaque foreign bodies, or   gross radiographic evidence for osteomyelitis.    No fractures or dislocations.    Tarsometatarsal alignment maintained without evidence fora Lisfranc   injury.    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 Preserved remaining joint spaces   and no joint margin erosions.    Bilateral posterosuperior calcaneal Víctor deformities and tiny   bilateral plantar calcaneal enthesophytes.    Generalized osteopenia otherwise no discrete lytic or blastic lesions.    Scant vascular calcifications.

## 2019-08-22 NOTE — PROVIDER CONTACT NOTE (CRITICAL VALUE NOTIFICATION) - ACTION/TREATMENT ORDERED:
will continue to monitor.
NP made aware, will continue to monitor
RASHIDA Ellington- notified- @ bedside for assessment- will follow up with attending, awaiting further orders.
Digoxin d/c.  Will continue to monitor.
NP assessed pt at bedside, ordered EKG, Cardiac enzymes lab,  given Morphine 0.25 mG IVP, lasix 20mg  IVP, famotidine 20mg IVP, dilaudid 0.25mG, Xanax 0.25mg as per ordered. repeat CE lab at 3 am, will keep continue to monitor.
PA ordered another PTT.
per Janneth Bah NP Venous ABG will monitor

## 2019-08-22 NOTE — CONSULT NOTE ADULT - ASSESSMENT
74yo F admitted for management of heart failure decompensation, HTN, DM and CKD who developed left ankle around 7 days ago. she has tenderness to palpation over the left ankle but no erythema or edema. she was risk factor for Gout including age, elevated uric acid, diuretic therapy and prior episodes treated with prednisone in the same joint.    Gout/elevated uric acid/prior episodes:       pending discussion with the atteding 72yo F admitted for management of heart failure decompensation, HTN, DM and CKD who developed left ankle around 7 days ago. she has tenderness to palpation over the left ankle but no erythema or edema. she was risk factor for Gout including age, elevated uric acid, diuretic therapy and prior episodes treated with prednisone in the same joint.    Oligoarticular Gout/elevated uric acid/prior episodes/CKD/diuretic therapy: all these factor increase the risk for gout attacks  -continue therapy with colchicine 0.6 daily during this acute period. will reassess her response. colchicine 0.6 daily for no more than 7 days. after that, colchicine will be renally adjusted to 0.3 mg.  -will hold steroid due to her uncontrolled DM, HF with SOB, and she currently is not in severe acute pain and monitor for colchicine response is appropiate for the moment  -if not responding to colchicine 0.6 mg daily will start a Steroid taper.      Steffen Lanier  Rheum Fellow I  D/w Attending Rachell 72yo F admitted for management of heart failure decompensation, HTN, DM and CKD who developed left ankle and hand pain and swelling around 7 days ago, on exam with tenderness to palpation over the left ankle but no erythema and minimal edema. Risk factors for gout including age, elevated uric acid, diuretic therapy and prior episodes treated with prednisone in the same joint.    Oligoarticular Gout/elevated uric acid/prior episodes/CKD/diuretic therapy: all these factor increase the risk for gout attacks  -Continue therapy with colchicine 0.6 daily during this acute period, which is permitted for an acute attack in the setting of renal dysfunction, however treatment with colchicine should not exceed one week due to renal dysfunction and concern for impaired clearance.   -will hold steroid for now due to her uncontrolled DM, HF, and she currently is not in severe acute pain and monitor for colchicine response is appropriate for the moment. Better glycemic control is needed.  -Will reassess her response over the next 2-3 days. If she does not respond to colchicine, will need to switch to prednisone. If patient does respond after 7 days of colchicine 0.6mg, qd, switch to colchicine 0.3 mg qd for another week.  -Once acute flare resolves, patient would benefit from urate lowering therapy due to frequent attacks (should be started outpatient).    Steffen Lanier  Rheum Fellow I  D/w Attending Rachell

## 2019-08-22 NOTE — PROGRESS NOTE ADULT - SUBJECTIVE AND OBJECTIVE BOX
CHIEF COMPLAINT:    SUBJECTIVE:     REVIEW OF SYSTEMS:    CONSTITUTIONAL: (  )  weakness,  (  ) fevers or chills  EYES/ENT: (  )visual changes;     NECK: (  ) pain or stiffness  RESPIRATORY:   (  )cough, wheezing, hemoptysis;  (  ) shortness of breath  CARDIOVASCULAR:  (  )chest pain or palpitations  GASTROINTESTINAL:   (  )abdominal or epigastric pain.  (  ) nausea, vomiting, or hematemesis;   (   ) diarrhea or constipation.   GENITOURINARY:   (    ) dysuria, frequency or hematuria  NEUROLOGICAL:  (   ) numbness or weakness   All other review of systems is negative unless indicated above    Vital Signs Last 24 Hrs  T(C): 36.4 (22 Aug 2019 04:19), Max: 36.7 (21 Aug 2019 20:16)  T(F): 97.6 (22 Aug 2019 04:19), Max: 98.1 (21 Aug 2019 20:16)  HR: 83 (22 Aug 2019 05:17) (79 - 89)  BP: 113/66 (22 Aug 2019 05:17) (97/60 - 126/65)  BP(mean): --  RR: 18 (22 Aug 2019 04:19) (18 - 18)  SpO2: 100% (22 Aug 2019 04:19) (96% - 100%)    I&O's Summary    21 Aug 2019 07:01  -  22 Aug 2019 07:00  --------------------------------------------------------  IN: 480 mL / OUT: 1450 mL / NET: -970 mL        CAPILLARY BLOOD GLUCOSE      POCT Blood Glucose.: 319 mg/dL (22 Aug 2019 07:41)  POCT Blood Glucose.: 383 mg/dL (21 Aug 2019 21:34)  POCT Blood Glucose.: 450 mg/dL (21 Aug 2019 17:47)  POCT Blood Glucose.: 437 mg/dL (21 Aug 2019 17:46)  POCT Blood Glucose.: 485 mg/dL (21 Aug 2019 16:29)  POCT Blood Glucose.: 435 mg/dL (21 Aug 2019 16:29)  POCT Blood Glucose.: 493 mg/dL (21 Aug 2019 14:22)  POCT Blood Glucose.: 469 mg/dL (21 Aug 2019 14:22)  POCT Blood Glucose.: 429 mg/dL (21 Aug 2019 11:38)  POCT Blood Glucose.: 428 mg/dL (21 Aug 2019 11:37)      PHYSICAL EXAM:    Constitutional:  (   ) NAD,   (   )awake and alert  HEENT: PERR, EOMI,    Neck: Soft and supple, No LAD, No JVD  Respiratory:  (    Breath sounds are clear bilaterally,    (   ) wheezing, rales or rhonchi  Cardiovascular:     (   )S1 and S2, regular rate and rhythm, no Murmurs, gallops or rubs  Gastrointestinal:  (   )Bowel Sounds present, soft,   (  )nontender, nondistended,    Extremities:    (  ) peripheral edema  Vascular: 2+ peripheral pulses  Neurological:    (    )A/O x 3,   (  ) focal deficits  Musculoskeletal:    (   )  normal strength b/l upper  (     ) normal  lower extremities  Skin: No rashes    MEDICATIONS:  MEDICATIONS  (STANDING):  acetaminophen  IVPB .. 1000 milliGRAM(s) IV Intermittent once  aspirin enteric coated 81 milliGRAM(s) Oral daily  atorvastatin 40 milliGRAM(s) Oral at bedtime  buMETAnide 3 milliGRAM(s) Oral every 12 hours  chlorhexidine 2% Cloths 1 Application(s) Topical daily  clopidogrel Tablet 75 milliGRAM(s) Oral daily  colchicine 0.6 milliGRAM(s) Oral daily  dextrose 5%. 1000 milliLiter(s) (50 mL/Hr) IV Continuous <Continuous>  dextrose 50% Injectable 12.5 Gram(s) IV Push once  dextrose 50% Injectable 25 Gram(s) IV Push once  dextrose 50% Injectable 25 Gram(s) IV Push once  docusate sodium 100 milliGRAM(s) Oral two times a day  heparin  Injectable 5000 Unit(s) SubCutaneous every 12 hours  hydrALAZINE 50 milliGRAM(s) Oral every 8 hours  insulin lispro (HumaLOG) corrective regimen sliding scale   SubCutaneous three times a day before meals  insulin lispro (HumaLOG) corrective regimen sliding scale   SubCutaneous at bedtime  insulin lispro Injectable (HumaLOG) 28 Unit(s) SubCutaneous three times a day before meals  insulin NPH/regular 70/30 Injectable 76 Unit(s) SubCutaneous before breakfast  insulin NPH/regular 70/30 Injectable 68 Unit(s) SubCutaneous before dinner  insulin NPH/regular 70/30 Injectable 70 Unit(s) SubCutaneous before breakfast  isosorbide   dinitrate Tablet (ISORDIL) 30 milliGRAM(s) Oral three times a day  levothyroxine 50 MICROGram(s) Oral daily  lidocaine   Patch 1 Patch Transdermal daily  melatonin 3 milliGRAM(s) Oral at bedtime  metoprolol succinate ER 25 milliGRAM(s) Oral daily  montelukast 10 milliGRAM(s) Oral daily  Nephro-enrico 1 Tablet(s) Oral daily  pantoprazole    Tablet 40 milliGRAM(s) Oral before breakfast  polyethylene glycol 3350 17 Gram(s) Oral daily  senna 2 Tablet(s) Oral at bedtime  spironolactone 25 milliGRAM(s) Oral daily      LABS: All Labs Reviewed:                        9.0    9.61  )-----------( 361      ( 22 Aug 2019 08:14 )             27.4     08-22    134<L>  |  98  |  87<H>  ----------------------------<  267<H>  3.9   |  19<L>  |  1.93<H>    Ca    9.6      22 Aug 2019 05:59            Blood Culture:   Urine Culture      RADIOLOGY/EKG:    ASSESSMENT AND PLAN:    DVT PPX:    ADVANCED DIRECTIVE:    DISPOSITION: CHIEF COMPLAINT:  patient seen in the bed awake with verbal is still complaining of weakness also right ankle pain secondary to gout her blood sugar is still elevated more than 3 350 discussed with medical team and endocrinologist will ask ICU to see the patient she needs intravenous insulin and close monitoring of her sugar she is not responding to current treatment subcu  SUBJECTIVE:     REVIEW OF SYSTEMS:    CONSTITUTIONAL: (x  )  weakness,  (  ) fevers or chills  EYES/ENT: (  )visual changes;     NECK: (  ) pain or stiffness  RESPIRATORY:   (  )cough, wheezing, hemoptysis;  ( x ) shortness of breath  CARDIOVASCULAR:  (  )chest pain or palpitations  GASTROINTESTINAL:   (  )abdominal or epigastric pain.  (  ) nausea, vomiting, or hematemesis;   (   ) diarrhea or constipation.   GENITOURINARY:   (    ) dysuria, frequency or hematuria  NEUROLOGICAL:  (   ) numbness or weakness   All other review of systems is negative unless indicated above    Vital Signs Last 24 Hrs  T(C): 36.4 (22 Aug 2019 04:19), Max: 36.7 (21 Aug 2019 20:16)  T(F): 97.6 (22 Aug 2019 04:19), Max: 98.1 (21 Aug 2019 20:16)  HR: 83 (22 Aug 2019 05:17) (79 - 89)  BP: 113/66 (22 Aug 2019 05:17) (97/60 - 126/65)  BP(mean): --  RR: 18 (22 Aug 2019 04:19) (18 - 18)  SpO2: 100% (22 Aug 2019 04:19) (96% - 100%)    I&O's Summary    21 Aug 2019 07:01  -  22 Aug 2019 07:00  --------------------------------------------------------  IN: 480 mL / OUT: 1450 mL / NET: -970 mL        CAPILLARY BLOOD GLUCOSE      POCT Blood Glucose.: 319 mg/dL (22 Aug 2019 07:41)  POCT Blood Glucose.: 383 mg/dL (21 Aug 2019 21:34)  POCT Blood Glucose.: 450 mg/dL (21 Aug 2019 17:47)  POCT Blood Glucose.: 437 mg/dL (21 Aug 2019 17:46)  POCT Blood Glucose.: 485 mg/dL (21 Aug 2019 16:29)  POCT Blood Glucose.: 435 mg/dL (21 Aug 2019 16:29)  POCT Blood Glucose.: 493 mg/dL (21 Aug 2019 14:22)  POCT Blood Glucose.: 469 mg/dL (21 Aug 2019 14:22)  POCT Blood Glucose.: 429 mg/dL (21 Aug 2019 11:38)  POCT Blood Glucose.: 428 mg/dL (21 Aug 2019 11:37)      PHYSICAL EXAM:   Constitutional:  ( x  ) NAD,   (  x )awake and alert  HEENT: PERR, EOMI,    Neck: Soft and supple, No LAD, No JVD  Respiratory:  ( x   Breath sounds are clear bilaterally,    (   ) wheezing, rales or rhonchi  Cardiovascular:     (  x )S1 and S2, r   Gastrointestinal:  (  x )Bowel Sounds present, soft,   (  )nontender, nondistended,    Extremities:    (  ) peripheral edema  Vascular: 2+ peripheral pulses  Neurological:    (  x  )A/O x 3,   (  ) focal deficits  Musculoskeletal:    (  x )  normal strength b/l upper  (     ) normal  lower extremities  Skin: No rashesmaking only wearing that and when the renal unit this makes him none  MEDICATIONS:  MEDICATIONS  (STANDING):  acetaminophen  IVPB .. 1000 milliGRAM(s) IV Intermittent once  aspirin enteric coated 81 milliGRAM(s) Oral daily  atorvastatin 40 milliGRAM(s) Oral at bedtime  buMETAnide 3 milliGRAM(s) Oral every 12 hours  chlorhexidine 2% Cloths 1 Application(s) Topical daily  clopidogrel Tablet 75 milliGRAM(s) Oral daily  colchicine 0.6 milliGRAM(s) Oral daily  dextrose 5%. 1000 milliLiter(s) (50 mL/Hr) IV Continuous <Continuous>  dextrose 50% Injectable 12.5 Gram(s) IV Push once  dextrose 50% Injectable 25 Gram(s) IV Push once  dextrose 50% Injectable 25 Gram(s) IV Push once  docusate sodConstitutional:  ( x  ) NAD,   (  x )awake and alert  HEENT: PERR, EOMI,    Neck: Soft and supple, No LAD, No JVD  Respiratory:  ( x   Breath sounds are clear bilaterally,    (   ) wheezing, rales or rhonchi  Cardiovascular:     (  x )S1 and S2, r   Gastrointestinal:  (  x )Bowel Sounds present, soft,   (  )nontender, nondistended,    Extremities:    (  ) peripheral edema  Vascular: 2+ peripheral pulses  Neurological:    (  x  )A/O x 3,   (  ) focal deficits  Musculoskeletal:    (  x )  normal strength b/l upper  (     ) normal  lower extremities  Skin: No rashesium 100 milliGRAM(s) Oral two times a day  heparin  Injectable 5000 Unit(s) SubCutaneous every 12 hours  hydrALAZINE 50 milliGRAM(s) Oral every 8 hours  insulin lispro (HumaLOG) corrective regimen sliding scale   SubCutaneous three times a day before meals  insulin lispro (HumaLOG) corrective regimen sliding scale   SubCutaneous at bedtime  insulin lispro Injectable (HumaLOG) 28 Unit(s) SubCutaneous three times a day before meals  insulin NPH/regular 70/30 Injectable 76 Unit(s) SubCutaneous before breakfast  insulin NPH/regular 70/30 Injectable 68 Unit(s) SubCutaneous before dinner  insulin NPH/regular 70/30 Injectable 70 Unit(s) SubCutaneous before breakfast  isosorbide   dinitrate Tablet (ISORDIL) 30 milliGRAM(s) Oral three times a day  levothyroxine 50 MICROGram(s) Oral daily  lidocaine   Patch 1 Patch Transdermal daily  melatonin 3 milliGRAM(s) Oral at bedtime  metoprolol succinate ER 25 milliGRAM(s) Oral daily  montelukast 10 milliGRAM(s) Oral daily  Nephro-enrico 1 Tablet(s) Oral daily  pantoprazole    Tablet 40 milliGRAM(s) Oral before breakfast  polyethylene glycol 3350 17 Gram(s) Oral daily  senna 2 Tablet(s) Oral at bedtime  spironolactone 25 milliGRAM(s) Oral daily      LABS: All Labs Reviewed:                        9.0    9.61  )-----------( 361      ( 22 Aug 2019 08:14 )             27.4     08-22    134<L>  |  98  |  87<H>  ----------------------------<  267<H>  3.9   |  19<L>  |  1.93<H>    Ca    9.6      22 Aug 2019 05:59            Blood Culture:   Urine Culture      RADIOLOGY/EKG:    ASSESSMENT AND PLAN:  74 yo woman with PMHx of HTN, T2DM (A1c 7.4%), CAD s/p CABG (LIMA to LAD, SVG to OM, SVG to PDA 2014 at [facility]), non-dilated ICM (EF 20-25%), severe mitral regurgitation s/p mitral clip (6/13/19 Dr. Newman), severe pulm HTN, CKD, hypothyroidism, who is presenting to ED after experiencing worsening SOB and intermittent chest pain for 1 week at [facility] [place]. Of note, pt was recently discharged on 6/19 after having mitral clip procedure completed. She initially required BiPAP with improvement in her respiratory status following diuresis. Initiated on vasopressors and inotropes in CCU, which were subsequently weaned off. Infectious work up was negative.    #HFrEF likely 2/2 ICM with MR s/p alonso clip  -  continue with Bumex 3 mg twice daily        - continue hydralazine 50 mg TID and isordil 30 TID  - continue spironolactone 25 mg  - continue Metoprolol Succinate 25 mg PO Daily   - No further cardiac testing at this time.      #CAD  - continue ASA and statin   - Pt with chest pain, but also with end stage cardiomyopathy, coronary disease, had MitraClip and no further intervention feasible. She should not have trops checked.     #SVT - likely Atrial Tach vs. Atrial Flutter   - Amio initially not given due to elevated LFTs likely in the setting of congestion.   - recheck LFTs.   - Monitor on tele.   - continue metoprolol at current dose as above  - If pt has SVT again can consider amio   #diabetes     on   NPH 7030  76 units before breakfast and 68 units before dinner  her hyperglycemia is worse patient needs to be on insulin drip discussed with endocrinologist   will contact the MICU  for possible transfer  Cushing's syndrome was ruled out    DVT PPX:    ADVANCED DIRECTIVE:    DISPOSITION:

## 2019-08-22 NOTE — PROGRESS NOTE ADULT - ASSESSMENT
Assessment  DMT2: 73y Female with DM T2 with hyperglycemia on insulin, blood sugars remained elevated but overall appear to be down-trending after increasing dose to current regimen: (NPH 70/30 76 units before breakfast and 68u before dinner, as well as Humalog 28 units before meals), she is eating partial meals.  CAD: S/P cabg On medications, stable, monitored.  Hypothyroidism: synthroid 50 mcg po daily, asymptomatic.  HTN: Controlled, On med.  HLD; on statin  CKD: Monitor labs/BMP        Plan:   DMT2:  Will increase pre-meal Humalog to 15u before each meal, and continue mixed insulin dose at NPH 70/30 70 units before breakfast and 60u before dinner. Will continue monitoring FS, log, and follow up.  Recommended that Rheum follow patient given elevated uric acid and pain on palpation of left ankle. Possible gout may explain refractory DM.  CAD: S/P cabg On medications, stable, monitored.  Hypothyroidism: Continue synthroid 50 mcg po daily, asymptomatic. F/U with TFTs as outpatient  HTN: Continue treatment, monitoring, Primary team FU  HLD; on statin  CKD: Continue monitoring labs, renal FU Assessment  DMT2: 73y Female with DM T2 with hyperglycemia on insulin, blood sugars remained elevated but overall appear to be down-trending after increasing dose to current regimen: (NPH 70/30 76 units before breakfast and 68u before dinner, as well as Humalog 28 units before meals), she is eating partial meals.  CAD: S/P cabg On medications, stable, monitored.  Hypothyroidism: synthroid 50 mcg po daily, asymptomatic.  HTN: Controlled, On med.  HLD; on statin  CKD: Monitor labs/BMP        Plan:   DMT2:  Will continue current insulin regimen for now. Will continue monitoring FS, log, and follow up. Recommended that Rheum follow patient given elevated uric acid and pain on palpation of left ankle. Possible gout may explain refractory DM. Spoke with patient and primary team.  CAD: S/P cabg On medications, stable, monitored.  Hypothyroidism: Continue synthroid 50 mcg po daily, asymptomatic. F/U with TFTs as outpatient  HTN: Continue treatment, monitoring, Primary team FU  HLD; on statin  CKD: Continue monitoring labs, renal FU

## 2019-08-22 NOTE — PROGRESS NOTE ADULT - ASSESSMENT
Pt is a 72 yo woman with PMHx of HTN, T2DM, CAD s/p CABG (LIMA to LAD, SVG to OM, SVG to PDA 2014 at [facility]), non-dilated ICM (EF 20-25%), severe mitral regurgitation s/p mitral clip (6/13/19 Dr. Newman), severe pulm HTN, CKD, hypothyroidism admitted with worsening SOB.    A/P:  MERVIN  Likely due to cardiogenic shock  Renal function remains stable at baseline but subject to fluctuations based on Renal Perfusion.  Pt currently on Bumex 3 mg PO Q12   - Continue diuretic therapy per cardiology.  - Optimize glucose control  - Monitor renal function   - Avoid further nephrotoxics, NSAIDS, RCA    Hyponatremia  in the setting of hyperglycemia.   Currently stable. Monitor   Optimize glucose control    CKD stage 4:  baseline Scr 1.8-2.0. Scr stable today.   Renal function fluctuates sec to CHF  Monitor renal function at present    SOB:  in setting of HF. Improved on diuretic therapy.   Continue diuretics per cardiology    Acidosis   Sec to Renal failure  Monitor serum Co2 at present    Hypokalemia:  in the setting of diuretic therapy.   Serum potassium stable      Anemia:   Hb stable  Pt with iron deficiency anemia.

## 2019-08-22 NOTE — CONSULT NOTE ADULT - ATTENDING COMMENTS
Pt seen and examined. MICU consulted for hyperglycemia. 73 F with extensive PMH as above now wit hyperglycemia. FS improving ( latest 233) on current regimen. B-hydroxy butyrate 0.1. AG met acidosis likely 2/2 renal dysfunction. cont to follow FS. Not a MICU candidate at present. If hyperglycemia worsens please reconsult. Pt seen and examined. MICU consulted for hyperglycemia. 73 F with extensive PMH as above now wit hyperglycemia. FS improving ( latest 233) on current regimen. B-hydroxy butyrate 0.1. AG met acidosis likely 2/2 renal dysfunction. Endo following and adjusting insulin regimen. cont to follow FS. Not a MICU candidate at present. If hyperglycemia worsens please reconsult.

## 2019-08-22 NOTE — PROGRESS NOTE ADULT - SUBJECTIVE AND OBJECTIVE BOX
[facility]   MD GONZALEZ SHAH D.O, PA    TELEPHONE NUMBERS:  OFFICE: [phone]  DR. SANTOYO CELL: [phone]  JUSTEN FIELD CELL: [phone]  DR. RIDER CELL:  [phone]    RENAL FOLLOW UP NOTE  --------------------------------------------------------------------------------  HPI: Pt seen and examined at bedside. Pt admits to weakness. Denies SOB   Denies SOB, chest pain     PAST HISTORY  --------------------------------------------------------------------------------  No significant changes to PMH, PSH, FHx, SHx, unless otherwise noted    ALLERGIES & MEDICATIONS  --------------------------------------------------------------------------------  Allergies    azithromycin (Hives; Pruritus)    Intolerances      Standing Inpatient Medications  acetaminophen  IVPB .. 1000 milliGRAM(s) IV Intermittent once  aspirin enteric coated 81 milliGRAM(s) Oral daily  atorvastatin 40 milliGRAM(s) Oral at bedtime  buMETAnide 3 milliGRAM(s) Oral every 12 hours  chlorhexidine 2% Cloths 1 Application(s) Topical daily  clopidogrel Tablet 75 milliGRAM(s) Oral daily  colchicine 0.6 milliGRAM(s) Oral daily  dextrose 5%. 1000 milliLiter(s) IV Continuous <Continuous>  dextrose 50% Injectable 12.5 Gram(s) IV Push once  dextrose 50% Injectable 25 Gram(s) IV Push once  dextrose 50% Injectable 25 Gram(s) IV Push once  docusate sodium 100 milliGRAM(s) Oral two times a day  heparin  Injectable 5000 Unit(s) SubCutaneous every 12 hours  hydrALAZINE 50 milliGRAM(s) Oral every 8 hours  insulin lispro (HumaLOG) corrective regimen sliding scale   SubCutaneous three times a day before meals  insulin lispro (HumaLOG) corrective regimen sliding scale   SubCutaneous at bedtime  insulin lispro Injectable (HumaLOG) 28 Unit(s) SubCutaneous three times a day before meals  insulin NPH/regular 70/30 Injectable 76 Unit(s) SubCutaneous before breakfast  insulin NPH/regular 70/30 Injectable 68 Unit(s) SubCutaneous before dinner  insulin regular  human recombinant. 5 Unit(s) IV Push once  isosorbide   dinitrate Tablet (ISORDIL) 30 milliGRAM(s) Oral three times a day  levothyroxine 50 MICROGram(s) Oral daily  lidocaine   Patch 1 Patch Transdermal daily  melatonin 3 milliGRAM(s) Oral at bedtime  metoprolol succinate ER 25 milliGRAM(s) Oral daily  montelukast 10 milliGRAM(s) Oral daily  Nephro-enrico 1 Tablet(s) Oral daily  pantoprazole    Tablet 40 milliGRAM(s) Oral before breakfast  polyethylene glycol 3350 17 Gram(s) Oral daily  senna 2 Tablet(s) Oral at bedtime  spironolactone 25 milliGRAM(s) Oral daily    PRN Inpatient Medications  acetaminophen   Tablet .. 650 milliGRAM(s) Oral every 6 hours PRN  ALPRAZolam 0.25 milliGRAM(s) Oral three times a day PRN  dextrose 40% Gel 15 Gram(s) Oral once PRN  glucagon  Injectable 1 milliGRAM(s) IntraMuscular once PRN  traMADol 25 milliGRAM(s) Oral every 6 hours PRN      REVIEW OF SYSTEMS  --------------------------------------------------------------------------------  General: no fever  CVS: no chest pain  RESP: no sob, no cough  ABD: no abdominal pain  : no dysuria,  MSK: no edema     VITALS/PHYSICAL EXAM  --------------------------------------------------------------------------------  T(C): 36.4 (08-22-19 @ 04:19), Max: 36.7 (08-21-19 @ 20:16)  HR: 83 (08-22-19 @ 05:17) (79 - 89)  BP: 113/66 (08-22-19 @ 05:17) (97/60 - 126/65)  RR: 18 (08-22-19 @ 04:19) (18 - 18)  SpO2: 100% (08-22-19 @ 04:19) (96% - 100%)  Wt(kg): --        08-21-19 @ 07:01  -  08-22-19 @ 07:00  --------------------------------------------------------  IN: 480 mL / OUT: 1450 mL / NET: -970 mL      Physical Exam:  	Gen: NAD  	HEENT: MMM  	Pulm: CTA B/L  	CV: S1S2  	Abd: Soft, +BS  	Ext: No LE edema B/L                      Neuro: Awake   	Skin: Warm and Dry   	Vascular access:    LABS/STUDIES  --------------------------------------------------------------------------------              9.0    9.61  >-----------<  361      [08-22-19 @ 08:14]              27.4     134  |  98  |  87  ----------------------------<  267      [08-22-19 @ 05:59]  3.9   |  19  |  1.93        Ca     9.6     [08-22-19 @ 05:59]    Creatinine Trend:  SCr 1.93 [08-22 @ 05:59]  SCr 2.02 [08-21 @ 06:52]  SCr 1.89 [08-20 @ 07:17]  SCr 2.03 [08-19 @ 05:57]  SCr 1.92 [08-18 @ 06:00]        Iron 42, TIBC 348, %sat 12      [07-05-19 @ 22:32]  Ferritin 130      [07-05-19 @ 22:32]  .4 (Ca --)      [04-25-19 @ 05:45]   --  PTH 43.55 (Ca --)      [09-10-18 @ 07:15]   --  PTH 36.60 (Ca --)      [09-08-18 @ 03:15]   --  Vitamin D (25OH) 25.9      [05-01-19 @ 04:00]  HbA1c 7.4      [07-05-19 @ 22:32]  TSH 2.00      [07-05-19 @ 22:32]  Lipid: chol 148, , HDL 35,       [06-01-19 @ 08:00]

## 2019-08-22 NOTE — CONSULT NOTE ADULT - SUBJECTIVE AND OBJECTIVE BOX
CHIEF COMPLAINT: SOB    HPI:  73 yr old female with PMHx of DM2, HTN, CAD s/p CABG (2014), sever MR s/p MR clip (6/13/19)  HFrEF (21% 6/15/19) severe PulmHTN (PAS 70 4/26/19), hypothyroidsim, CKD who originally presented to [facility] 7/5/19 with ADHF requiring CCU admission, treated with lasix/dobutrex threapy with eventual improvement and transfer to floor. General fallon course c/b episodes of hyperglycemia greater the 250's with max of 490's over past several days. Pt has received over past 24 hrs 25 units of Lispro sliding scale with 24 units lispro premeals in addition to 70 units of 70/30 before breakfast and 60 units 70/30 before dinner. Serum HCO3 of 19 with AG of 17, Scr 1.93 (baseline 1.64 - 2.18)       Consult called for hyperglycemia.                 PAST MEDICAL & SURGICAL HISTORY:  GERD (gastroesophageal reflux disease)  CAD (coronary artery disease): s/p CABG  Hypothyroidism  Hyperlipidemia  Diabetes mellitus, type 2  S/P CABG (coronary artery bypass graft)      FAMILY HISTORY:  Family history of hypertension (Sibling): sister  Family history of diabetes mellitus (Sibling): brothers/sisters      SOCIAL HISTORY:  Smoking: [ ] Never Smoked [ ] Former Smoker (__ packs x ___ years) [ ] Current Smoker  (__ packs x ___ years)  Substance Use: [ ] Never Used [ ] Used ____  EtOH Use:  Marital Status: [ ] Single [ ]  [ ]  [ ]   Sexual History:   Occupation:  Recent Travel:  Country of Birth:  Advance Directives:    Allergies    azithromycin (Hives; Pruritus)    Intolerances        HOME MEDICATIONS:    REVIEW OF SYSTEMS:      OBJECTIVE:  ICU Vital Signs Last 24 Hrs  T(C): 36.4 (22 Aug 2019 04:19), Max: 36.7 (21 Aug 2019 20:16)  T(F): 97.6 (22 Aug 2019 04:19), Max: 98.1 (21 Aug 2019 20:16)  HR: 83 (22 Aug 2019 05:17) (79 - 89)  BP: 113/66 (22 Aug 2019 05:17) (97/60 - 126/65)  BP(mean): --  ABP: --  ABP(mean): --  RR: 18 (22 Aug 2019 04:19) (18 - 18)  SpO2: 100% (22 Aug 2019 04:19) (96% - 100%)        08-21 @ 07:01  -  08-22 @ 07:00  --------------------------------------------------------  IN: 480 mL / OUT: 1450 mL / NET: -970 mL      CAPILLARY BLOOD GLUCOSE      POCT Blood Glucose.: 393 mg/dL (22 Aug 2019 10:25)      PHYSICAL EXAM:        HOSPITAL MEDICATIONS:  MEDICATIONS  (STANDING):  acetaminophen  IVPB .. 1000 milliGRAM(s) IV Intermittent once  aspirin enteric coated 81 milliGRAM(s) Oral daily  atorvastatin 40 milliGRAM(s) Oral at bedtime  buMETAnide 3 milliGRAM(s) Oral every 12 hours  chlorhexidine 2% Cloths 1 Application(s) Topical daily  clopidogrel Tablet 75 milliGRAM(s) Oral daily  colchicine 0.6 milliGRAM(s) Oral daily  dextrose 5%. 1000 milliLiter(s) (50 mL/Hr) IV Continuous <Continuous>  dextrose 50% Injectable 12.5 Gram(s) IV Push once  dextrose 50% Injectable 25 Gram(s) IV Push once  dextrose 50% Injectable 25 Gram(s) IV Push once  docusate sodium 100 milliGRAM(s) Oral two times a day  heparin  Injectable 5000 Unit(s) SubCutaneous every 12 hours  hydrALAZINE 50 milliGRAM(s) Oral every 8 hours  insulin lispro (HumaLOG) corrective regimen sliding scale   SubCutaneous three times a day before meals  insulin lispro (HumaLOG) corrective regimen sliding scale   SubCutaneous at bedtime  insulin lispro Injectable (HumaLOG) 28 Unit(s) SubCutaneous three times a day before meals  insulin NPH/regular 70/30 Injectable 76 Unit(s) SubCutaneous before breakfast  insulin NPH/regular 70/30 Injectable 68 Unit(s) SubCutaneous before dinner  insulin NPH/regular 70/30 Injectable 70 Unit(s) SubCutaneous before breakfast  insulin regular  human recombinant. 5 Unit(s) IV Push once  isosorbide   dinitrate Tablet (ISORDIL) 30 milliGRAM(s) Oral three times a day  levothyroxine 50 MICROGram(s) Oral daily  lidocaine   Patch 1 Patch Transdermal daily  melatonin 3 milliGRAM(s) Oral at bedtime  metoprolol succinate ER 25 milliGRAM(s) Oral daily  montelukast 10 milliGRAM(s) Oral daily  Nephro-enrico 1 Tablet(s) Oral daily  pantoprazole    Tablet 40 milliGRAM(s) Oral before breakfast  polyethylene glycol 3350 17 Gram(s) Oral daily  senna 2 Tablet(s) Oral at bedtime  spironolactone 25 milliGRAM(s) Oral daily    MEDICATIONS  (PRN):  acetaminophen   Tablet .. 650 milliGRAM(s) Oral every 6 hours PRN Mild Pain (1 - 3), Moderate Pain (4 - 6)  ALPRAZolam 0.25 milliGRAM(s) Oral three times a day PRN Anxiety  dextrose 40% Gel 15 Gram(s) Oral once PRN Blood Glucose LESS THAN 70 milliGRAM(s)/deciliter  glucagon  Injectable 1 milliGRAM(s) IntraMuscular once PRN Glucose LESS THAN 70 milligrams/deciliter  traMADol 25 milliGRAM(s) Oral every 6 hours PRN Moderate Pain (4 - 6)      LABS:                        9.0    9.61  )-----------( 361      ( 22 Aug 2019 08:14 )             27.4     Hgb Trend: 9.0<--, 9.1<--, 9.4<--  08-22    134<L>  |  98  |  87<H>  ----------------------------<  267<H>  3.9   |  19<L>  |  1.93<H>    Ca    9.6      22 Aug 2019 05:59      Creatinine Trend: 1.93<--, 2.02<--, 1.89<--, 2.03<--, 1.92<--, 1.86<--            MICROBIOLOGY:     RADIOLOGY:  [ ] Reviewed and interpreted by me    EKG:        Keegan ANP-BC (ext. [id number]) CHIEF COMPLAINT: SOB    HPI:  73 yr old female with PMHx of DM2, HTN, CAD s/p CABG (2014), sever MR s/p MR clip (6/13/19)  HFrEF (21% 6/15/19) severe PulmHTN (PAS 70 4/26/19), hypothyroidsim, CKD who originally presented to [facility] 7/5/19 with ADHF requiring CCU admission, treated with lasix/dobutrex threapy with eventual improvement and transfer to floor. General fallon course c/b episodes of hyperglycemia greater the 250's with max of 490's over past several days and development of Rt ankle pain and swelling, felt due to development of gout.  Pt has received over past 24 hrs 25 units of Lispro sliding scale with 24 units lispro premeals in addition to 70 units of 70/30 before breakfast and 60 units 70/30 before dinner. Serum HCO3 of 19 with AG of 17, Scr 1.93 (baseline 1.64 - 2.18)       Consult called for hyperglycemia.                 PAST MEDICAL & SURGICAL HISTORY:  GERD (gastroesophageal reflux disease)  CAD (coronary artery disease): s/p CABG  Hypothyroidism  Hyperlipidemia  Diabetes mellitus, type 2  S/P CABG (coronary artery bypass graft)      FAMILY HISTORY:  Family history of hypertension (Sibling): sister  Family history of diabetes mellitus (Sibling): brothers/sisters      SOCIAL HISTORY:  Smoking: [ ] Never Smoked [ ] Former Smoker (__ packs x ___ years) [ ] Current Smoker  (__ packs x ___ years)  Substance Use: [ ] Never Used [ ] Used ____  EtOH Use:  Marital Status: [ ] Single [ ]  [ ]  [ ]   Sexual History:   Occupation:  Recent Travel:  Country of Birth:  Advance Directives:    Allergies    azithromycin (Hives; Pruritus)    Intolerances        HOME MEDICATIONS:    REVIEW OF SYSTEMS:    CONSTITUTIONAL: No weakness, fevers or chills    EYES/ENT: No visual changes;  No vertigo or throat pain     NECK: No pain or stiffness    RESPIRATORY: No cough, wheezing, hemoptysis; No shortness of breath    CARDIOVASCULAR: No chest pain or palpitations    GASTROINTESTINAL: No abdominal or epigastric pain. No nausea, vomiting, or hematemesis; No   diarrhea or constipation. No melena or hematochezia.    GENITOURINARY: No dysuria, frequency or hematuria    NEUROLOGICAL: No numbness or weakness    SKIN: No itching, rashes    OBJECTIVE:  ICU Vital Signs Last 24 Hrs  T(C): 36.4 (22 Aug 2019 04:19), Max: 36.7 (21 Aug 2019 20:16)  T(F): 97.6 (22 Aug 2019 04:19), Max: 98.1 (21 Aug 2019 20:16)  HR: 83 (22 Aug 2019 05:17) (79 - 89)  BP: 113/66 (22 Aug 2019 05:17) (97/60 - 126/65)  BP(mean): --  ABP: --  ABP(mean): --  RR: 18 (22 Aug 2019 04:19) (18 - 18)  SpO2: 100% (22 Aug 2019 04:19) (96% - 100%)        08-21 @ 07:01  -  08-22 @ 07:00  --------------------------------------------------------  IN: 480 mL / OUT: 1450 mL / NET: -970 mL      CAPILLARY BLOOD GLUCOSE      POCT Blood Glucose.: 393 mg/dL (22 Aug 2019 10:25)    VITAL SIGNS AT TIME OF CONSULT:  BP: 102/59 HR: 82; RR: 22; SPO2 99% (Rm Air); Temp: 36.6 (P.O.)    PHYSICAL EXAM:  GENERAL: NAD, well-groomed, well-developed, laying flat supine, c/o generalized pain and tenderness with palpation to all extremities  HEAD:  Atraumatic, Normocephalic  EYES: EOMI, PERRLA 3mm, conjunctiva and sclera clear, pale  ENMT: No oropharyngeal exudates, erythema or lesions,  Moist mucous membranes  NECK: Supple, no cervical lymphadenopathy, no JVD  NERVOUS SYSTEM: Alert & Oriented X2 to 3 (with suggestions) CN II-XII intact, Moves all extremities  ; Upper extremities  3-4/5; Lower extremities3-4 /5, full sensation to light touch   CHEST/LUNG: Breath sounds bilaterally, able to take deep breaths spontaneously and upon command, diminished in lower lung fields bases to 1/4 up, Without crackles, rhonchi, wheezing, or rubs  HEART: cardiac monitor   NSR  ; S1/S2 I/VI sys murmurs, rubs, or gallops  ABDOMEN: Soft, Nontender, Nondistended; Bowel sounds present, Bladder non distended, non palpable  EXTREMITIES: Pulses palpable radial pulses 1+ bilat, DP/PT 1+/1+ bilat, without clubbing, cyanosis. Digits warm to touch with good cap refill < 3 secs. without appreciable joint swelling. all joints warm to touch.   SKIN: warm, dry, intact, normal color, no rash or abnormal lesions, without palpable nodes      HOSPITAL MEDICATIONS:  MEDICATIONS  (STANDING):  acetaminophen  IVPB .. 1000 milliGRAM(s) IV Intermittent once  aspirin enteric coated 81 milliGRAM(s) Oral daily  atorvastatin 40 milliGRAM(s) Oral at bedtime  buMETAnide 3 milliGRAM(s) Oral every 12 hours  chlorhexidine 2% Cloths 1 Application(s) Topical daily  clopidogrel Tablet 75 milliGRAM(s) Oral daily  colchicine 0.6 milliGRAM(s) Oral daily  dextrose 5%. 1000 milliLiter(s) (50 mL/Hr) IV Continuous <Continuous>  dextrose 50% Injectable 12.5 Gram(s) IV Push once  dextrose 50% Injectable 25 Gram(s) IV Push once  dextrose 50% Injectable 25 Gram(s) IV Push once  docusate sodium 100 milliGRAM(s) Oral two times a day  heparin  Injectable 5000 Unit(s) SubCutaneous every 12 hours  hydrALAZINE 50 milliGRAM(s) Oral every 8 hours  insulin lispro (HumaLOG) corrective regimen sliding scale   SubCutaneous three times a day before meals  insulin lispro (HumaLOG) corrective regimen sliding scale   SubCutaneous at bedtime  insulin lispro Injectable (HumaLOG) 28 Unit(s) SubCutaneous three times a day before meals  insulin NPH/regular 70/30 Injectable 76 Unit(s) SubCutaneous before breakfast  insulin NPH/regular 70/30 Injectable 68 Unit(s) SubCutaneous before dinner  insulin NPH/regular 70/30 Injectable 70 Unit(s) SubCutaneous before breakfast  insulin regular  human recombinant. 5 Unit(s) IV Push once  isosorbide   dinitrate Tablet (ISORDIL) 30 milliGRAM(s) Oral three times a day  levothyroxine 50 MICROGram(s) Oral daily  lidocaine   Patch 1 Patch Transdermal daily  melatonin 3 milliGRAM(s) Oral at bedtime  metoprolol succinate ER 25 milliGRAM(s) Oral daily  montelukast 10 milliGRAM(s) Oral daily  Nephro-enrico 1 Tablet(s) Oral daily  pantoprazole    Tablet 40 milliGRAM(s) Oral before breakfast  polyethylene glycol 3350 17 Gram(s) Oral daily  senna 2 Tablet(s) Oral at bedtime  spironolactone 25 milliGRAM(s) Oral daily    MEDICATIONS  (PRN):  acetaminophen   Tablet .. 650 milliGRAM(s) Oral every 6 hours PRN Mild Pain (1 - 3), Moderate Pain (4 - 6)  ALPRAZolam 0.25 milliGRAM(s) Oral three times a day PRN Anxiety  dextrose 40% Gel 15 Gram(s) Oral once PRN Blood Glucose LESS THAN 70 milliGRAM(s)/deciliter  glucagon  Injectable 1 milliGRAM(s) IntraMuscular once PRN Glucose LESS THAN 70 milligrams/deciliter  traMADol 25 milliGRAM(s) Oral every 6 hours PRN Moderate Pain (4 - 6)      LABS:                        9.0    9.61  )-----------( 361      ( 22 Aug 2019 08:14 )             27.4     Hgb Trend: 9.0<--, 9.1<--, 9.4<--  08-22    134<L>  |  98  |  87<H>  ----------------------------<  267<H>  3.9   |  19<L>  |  1.93<H>    Ca    9.6      22 Aug 2019 05:59      Creatinine Trend: 1.93<--, 2.02<--, 1.89<--, 2.03<--, 1.92<--, 1.86<--            MICROBIOLOGY:     RADIOLOGY:  [ ] Reviewed and interpreted by me    EKG:        Keegan ANP-BC (ext. [id number]) CHIEF COMPLAINT: SOB    HPI:  73 yr old female with PMHx of DM2, HTN, CAD s/p CABG (2014), sever MR s/p MR clip (6/13/19)  HFrEF (21% 6/15/19) severe PulmHTN (PAS 70 4/26/19), hypothyroidsim, CKD who originally presented to [facility] 7/5/19 with ADHF requiring CCU admission, treated with lasix/dobutrex threapy with eventual improvement and transfer to floor. General fallon course c/b episodes of hyperglycemia greater the 250's with max of 490's over past several days and development of Rt ankle pain and swelling, felt due to development of gout.  Pt has received over past 24 hrs 25 units of Lispro sliding scale with 24 units lispro premeals in addition to 70 units of 70/30 before breakfast and 60 units 70/30 before dinner. Serum HCO3 of 19 with AG of 17, Scr 1.93 (baseline 1.64 - 2.18)       Consult called for hyperglycemia.         Add 1645: As pt  stable vital signs and glucose with improvement and without elevated beta hydroxybutyrate with current insulin regimen pt at this time not candidate for MICU admission. Thank you for consult, please contact us if we can be of further assistance with this pt        PAST MEDICAL & SURGICAL HISTORY:  GERD (gastroesophageal reflux disease)  CAD (coronary artery disease): s/p CABG  Hypothyroidism  Hyperlipidemia  Diabetes mellitus, type 2  S/P CABG (coronary artery bypass graft)      FAMILY HISTORY:  Family history of hypertension (Sibling): sister  Family history of diabetes mellitus (Sibling): brothers/sisters      SOCIAL HISTORY:  Smoking: [ ] Never Smoked [ ] Former Smoker (__ packs x ___ years) [ ] Current Smoker  (__ packs x ___ years)  Substance Use: [ ] Never Used [ ] Used ____  EtOH Use:  Marital Status: [ ] Single [ ]  [ ]  [ ]   Sexual History:   Occupation:  Recent Travel:  Country of Birth:  Advance Directives:    Allergies    azithromycin (Hives; Pruritus)    Intolerances        HOME MEDICATIONS:    REVIEW OF SYSTEMS:    CONSTITUTIONAL: No weakness, fevers or chills    EYES/ENT: No visual changes;  No vertigo or throat pain     NECK: No pain or stiffness    RESPIRATORY: No cough, wheezing, hemoptysis; No shortness of breath    CARDIOVASCULAR: No chest pain or palpitations    GASTROINTESTINAL: No abdominal or epigastric pain. No nausea, vomiting, or hematemesis; No   diarrhea or constipation. No melena or hematochezia.    GENITOURINARY: No dysuria, frequency or hematuria    NEUROLOGICAL: No numbness or weakness    SKIN: No itching, rashes    OBJECTIVE:  ICU Vital Signs Last 24 Hrs  T(C): 36.4 (22 Aug 2019 04:19), Max: 36.7 (21 Aug 2019 20:16)  T(F): 97.6 (22 Aug 2019 04:19), Max: 98.1 (21 Aug 2019 20:16)  HR: 83 (22 Aug 2019 05:17) (79 - 89)  BP: 113/66 (22 Aug 2019 05:17) (97/60 - 126/65)  BP(mean): --  ABP: --  ABP(mean): --  RR: 18 (22 Aug 2019 04:19) (18 - 18)  SpO2: 100% (22 Aug 2019 04:19) (96% - 100%)        08-21 @ 07:01  -  08-22 @ 07:00  --------------------------------------------------------  IN: 480 mL / OUT: 1450 mL / NET: -970 mL      CAPILLARY BLOOD GLUCOSE      POCT Blood Glucose.: 393 mg/dL (22 Aug 2019 10:25)    VITAL SIGNS AT TIME OF CONSULT:  BP: 102/59 HR: 82; RR: 22; SPO2 99% (Rm Air); Temp: 36.6 (P.O.)    PHYSICAL EXAM:  GENERAL: NAD, well-groomed, well-developed, laying flat supine, c/o generalized pain and tenderness with palpation to all extremities  HEAD:  Atraumatic, Normocephalic  EYES: EOMI, PERRLA 3mm, conjunctiva and sclera clear, pale  ENMT: No oropharyngeal exudates, erythema or lesions,  Moist mucous membranes  NECK: Supple, no cervical lymphadenopathy, no JVD  NERVOUS SYSTEM: Alert & Oriented X2 to 3 (with suggestions) CN II-XII intact, Moves all extremities  ; Upper extremities  3-4/5; Lower extremities3-4 /5, full sensation to light touch   CHEST/LUNG: Breath sounds bilaterally, able to take deep breaths spontaneously and upon command, diminished in lower lung fields bases to 1/4 up, Without crackles, rhonchi, wheezing, or rubs  HEART: cardiac monitor   NSR  ; S1/S2 I/VI sys murmurs, rubs, or gallops  ABDOMEN: Soft, Nontender, Nondistended; Bowel sounds present, Bladder non distended, non palpable  EXTREMITIES: Pulses palpable radial pulses 1+ bilat, DP/PT 1+/1+ bilat, without clubbing, cyanosis. Digits warm to touch with good cap refill < 3 secs. without appreciable joint swelling. all joints warm to touch.   SKIN: warm, dry, intact, normal color, no rash or abnormal lesions, without palpable nodes      HOSPITAL MEDICATIONS:  MEDICATIONS  (STANDING):  acetaminophen  IVPB .. 1000 milliGRAM(s) IV Intermittent once  aspirin enteric coated 81 milliGRAM(s) Oral daily  atorvastatin 40 milliGRAM(s) Oral at bedtime  buMETAnide 3 milliGRAM(s) Oral every 12 hours  chlorhexidine 2% Cloths 1 Application(s) Topical daily  clopidogrel Tablet 75 milliGRAM(s) Oral daily  colchicine 0.6 milliGRAM(s) Oral daily  dextrose 5%. 1000 milliLiter(s) (50 mL/Hr) IV Continuous <Continuous>  dextrose 50% Injectable 12.5 Gram(s) IV Push once  dextrose 50% Injectable 25 Gram(s) IV Push once  dextrose 50% Injectable 25 Gram(s) IV Push once  docusate sodium 100 milliGRAM(s) Oral two times a day  heparin  Injectable 5000 Unit(s) SubCutaneous every 12 hours  hydrALAZINE 50 milliGRAM(s) Oral every 8 hours  insulin lispro (HumaLOG) corrective regimen sliding scale   SubCutaneous three times a day before meals  insulin lispro (HumaLOG) corrective regimen sliding scale   SubCutaneous at bedtime  insulin lispro Injectable (HumaLOG) 28 Unit(s) SubCutaneous three times a day before meals  insulin NPH/regular 70/30 Injectable 76 Unit(s) SubCutaneous before breakfast  insulin NPH/regular 70/30 Injectable 68 Unit(s) SubCutaneous before dinner  insulin NPH/regular 70/30 Injectable 70 Unit(s) SubCutaneous before breakfast  insulin regular  human recombinant. 5 Unit(s) IV Push once  isosorbide   dinitrate Tablet (ISORDIL) 30 milliGRAM(s) Oral three times a day  levothyroxine 50 MICROGram(s) Oral daily  lidocaine   Patch 1 Patch Transdermal daily  melatonin 3 milliGRAM(s) Oral at bedtime  metoprolol succinate ER 25 milliGRAM(s) Oral daily  montelukast 10 milliGRAM(s) Oral daily  Nephro-enrico 1 Tablet(s) Oral daily  pantoprazole    Tablet 40 milliGRAM(s) Oral before breakfast  polyethylene glycol 3350 17 Gram(s) Oral daily  senna 2 Tablet(s) Oral at bedtime  spironolactone 25 milliGRAM(s) Oral daily    MEDICATIONS  (PRN):  acetaminophen   Tablet .. 650 milliGRAM(s) Oral every 6 hours PRN Mild Pain (1 - 3), Moderate Pain (4 - 6)  ALPRAZolam 0.25 milliGRAM(s) Oral three times a day PRN Anxiety  dextrose 40% Gel 15 Gram(s) Oral once PRN Blood Glucose LESS THAN 70 milliGRAM(s)/deciliter  glucagon  Injectable 1 milliGRAM(s) IntraMuscular once PRN Glucose LESS THAN 70 milligrams/deciliter  traMADol 25 milliGRAM(s) Oral every 6 hours PRN Moderate Pain (4 - 6)      LABS:                        9.0    9.61  )-----------( 361      ( 22 Aug 2019 08:14 )             27.4     Hgb Trend: 9.0<--, 9.1<--, 9.4<--  08-22    134<L>  |  98  |  87<H>  ----------------------------<  267<H>  3.9   |  19<L>  |  1.93<H>    Ca    9.6      22 Aug 2019 05:59      Creatinine Trend: 1.93<--, 2.02<--, 1.89<--, 2.03<--, 1.92<--, 1.86<--            MICROBIOLOGY:     RADIOLOGY:  [ ] Reviewed and interpreted by me    EKG:        Keegan ANP-BC (ext. [id number])

## 2019-08-22 NOTE — CHART NOTE - NSCHARTNOTEFT_GEN_A_CORE
MEDICINE NP   Late entry     SELVIN CLAIRE  73y Female  Patient is a 73y old  Female who presents with a chief complaint of Hypoxia, SOB (22 Aug 2019 14:27)       Event Summary:  Patient with elevated blood glucose consistently greater than 300s   Patient seen at bedside offering no c/o discomfort         Vital Signs Last 24 Hrs  T(C): 36.6 (22 Aug 2019 11:33), Max: 36.6 (22 Aug 2019 11:33)  T(F): 97.8 (22 Aug 2019 11:33), Max: 97.8 (22 Aug 2019 11:33)  HR: 86 (22 Aug 2019 18:19) (79 - 87)  BP: 107/63 (22 Aug 2019 18:19) (97/60 - 118/68)  BP(mean): --  RR: 18 (22 Aug 2019 13:31) (18 - 20)  SpO2: 97% (22 Aug 2019 13:31) (97% - 100%)        ICU consult called for elevated glucose of 393 for possible Insulin gtt.  Patient seen and evaluated for DKA  Acetone level 1.0 pH 7.42 from venous ABG,  CMP, phos. mag level sent   orders for 5 unit of Regular humulin Insulin to be given IVP x 1 recheck FS in 2 hours   repeat FS of 233 at that time  will continue to monitor     Janneth Bah, DNP-C  Medicine Department

## 2019-08-22 NOTE — CHART NOTE - NSCHARTNOTEFT_GEN_A_CORE
Nutrition Follow Up Note  Patient seen for: malnutrition follow up on 6TOW.    Chart reviewed, events noted.   Pt is 72 yo F with PMHx of HTN, T2DM (last HgbA1c 7.4%), CAD s/p CABG,  non-dilated ICM (EF 20-25%), severe mitral regurgitation s/p mitral clip, severe pulm HTN, CKD, hypothyroidism, presented [facility] ED [facility] +acute on chronic HF, euvolemic currently. MERVIN on CKD, renal function fluctuates, nephrology following.   End stage cardiomyopathy, no further interventions per chart. Palliative following. Pt POCT still elevated, may need insulin drip, endocrinology following.     Source: electronic medical record, pt (Albanian and English speaking-able to make needs known and answer RD questions appropriately, however pt is weak)    Diet : Consistent Carbohydrate (with evening snacks) + Low Sodium +1500ml Fluid Restriction + Halal    Patient reports: No acute GI distress reported. Last BM .    PO intake : Pt po intake improved since last follow up per chart. Consuming 50-75% of meals x 4 days and observed with ~75% of breakfast consumed this AM. Overall, it seems pt po intake varies, she will have several days of po intake varying <50% at times, but at other times 50-75% of meals consumed. Likely pt baseline po intake. Pt consumes food from home at times. Continue to provide 1 Sugar Free Health Shake daily, pt accepts this supplement.  Pt unable to participate in extensive verbal nutrition education in English and too weak too utilize translation phone at this time. RD remains available, no family at bedside at this time.     Source for PO intake: chart, pt     Enteral /Parenteral Nutrition: n/a    Daily Weight in k (-19), weight appears stable. Continue to monitor.  50.2 (-18), 51.2 (-17)    *: Nutrition Focused Physical Exam performed with pt's verbal consent with the following findings: no overt signs of muscle wasting to temporal, clavicle, acromion process, however noted with moderate muscle wasting to calves. No overt signs of fat loss to orbital fat pads or triceps.  48.5 (08-14) -> indicates 4.4 kG (8.3%) wt loss overall x ~1 month, appears most of which occurred within the past 10 days, ~3.7 kG (7.1%) lost since .   51.1 (-12), 49 (), 51.5 (08-10), 52 (), 52.2 (-), 51.7 (), 52.7 (), 52.4 (), 52.3 (), 52.9 ()    Pt height noted as 51", highly question accuracy based on visual appearance. Per chart, previous height noted as 59" on 2019 admit and would indicate BMI 22.7 based on wt 51 kG and is likely more accurate.    Pertinent Medications: MEDICATIONS  (STANDING):  acetaminophen  IVPB .. 1000 milliGRAM(s) IV Intermittent once  aspirin enteric coated 81 milliGRAM(s) Oral daily  atorvastatin 40 milliGRAM(s) Oral at bedtime  buMETAnide 3 milliGRAM(s) Oral every 12 hours  chlorhexidine 2% Cloths 1 Application(s) Topical daily  clopidogrel Tablet 75 milliGRAM(s) Oral daily  colchicine 0.6 milliGRAM(s) Oral daily  dextrose 5%. 1000 milliLiter(s) (50 mL/Hr) IV Continuous <Continuous>  dextrose 50% Injectable 12.5 Gram(s) IV Push once  dextrose 50% Injectable 25 Gram(s) IV Push once  dextrose 50% Injectable 25 Gram(s) IV Push once  docusate sodium 100 milliGRAM(s) Oral two times a day  heparin  Injectable 5000 Unit(s) SubCutaneous every 12 hours  hydrALAZINE 50 milliGRAM(s) Oral every 8 hours  insulin lispro (HumaLOG) corrective regimen sliding scale   SubCutaneous three times a day before meals  insulin lispro (HumaLOG) corrective regimen sliding scale   SubCutaneous at bedtime  insulin lispro Injectable (HumaLOG) 28 Unit(s) SubCutaneous three times a day before meals  insulin NPH/regular 70/30 Injectable 76 Unit(s) SubCutaneous before breakfast  insulin NPH/regular 70/30 Injectable 68 Unit(s) SubCutaneous before dinner  insulin NPH/regular 70/30 Injectable 70 Unit(s) SubCutaneous before breakfast  isosorbide   dinitrate Tablet (ISORDIL) 30 milliGRAM(s) Oral three times a day  levothyroxine 50 MICROGram(s) Oral daily  lidocaine   Patch 1 Patch Transdermal daily  melatonin 3 milliGRAM(s) Oral at bedtime  metoprolol succinate ER 25 milliGRAM(s) Oral daily  montelukast 10 milliGRAM(s) Oral daily  Nephro-enrico 1 Tablet(s) Oral daily  pantoprazole    Tablet 40 milliGRAM(s) Oral before breakfast  polyethylene glycol 3350 17 Gram(s) Oral daily  senna 2 Tablet(s) Oral at bedtime  spironolactone 25 milliGRAM(s) Oral daily    MEDICATIONS  (PRN):  acetaminophen   Tablet .. 650 milliGRAM(s) Oral every 6 hours PRN Mild Pain (1 - 3), Moderate Pain (4 - 6)  ALPRAZolam 0.25 milliGRAM(s) Oral three times a day PRN Anxiety  dextrose 40% Gel 15 Gram(s) Oral once PRN Blood Glucose LESS THAN 70 milliGRAM(s)/deciliter  glucagon  Injectable 1 milliGRAM(s) IntraMuscular once PRN Glucose LESS THAN 70 milligrams/deciliter  traMADol 25 milliGRAM(s) Oral every 6 hours PRN Moderate Pain (4 - 6)    Pertinent Labs:  @ 05:59: Na 134<L>, BUN 87<H>, Cr 1.93<H>, <H>, K+ 3.9, Phos --, Mg --, Alk Phos --, ALT/SGPT --, AST/SGOT --, HbA1c --    Finger Sticks:  POCT Blood Glucose.: 319 mg/dL ( @ 07:41)  POCT Blood Glucose.: 383 mg/dL ( @ 21:34)  POCT Blood Glucose.: 450 mg/dL ( @ 17:47)  POCT Blood Glucose.: 437 mg/dL ( @ 17:46)  POCT Blood Glucose.: 485 mg/dL ( @ 16:29)  POCT Blood Glucose.: 435 mg/dL ( @ 16:29)  POCT Blood Glucose.: 493 mg/dL ( @ 14:22)  POCT Blood Glucose.: 469 mg/dL ( @ 14:22)  POCT Blood Glucose.: 429 mg/dL ( @ 11:38)  POCT Blood Glucose.: 428 mg/dL (08-21 @ 11:37)      Skin per nursing documentation: no pressure injuries noted at this time  Edema: 1+ edema to R thumb and R index finger last noted     Estimated Needs:   [x] no change since previous assessment  [ ] recalculated:     Previous Nutrition Diagnosis: Predicted suboptimal energy intake -> Malnutrition, moderate (acute on chronic)  Nutrition Diagnosis is: ongoing, being addressed with oral nutrition supplement, micronutrient supplementation, liberalized diet. Pt medical condition likely precludes aggressive nutrition interventions at this time.    New Nutrition Diagnosis: n/a    Recommend  1) Continue to recommend Consistent Carbohydrate (with evening snacks) + Low Sodium + Halal diet. Fluids deferred to team.   2) Continue to provide SF Health Shake 1x/daily.  3) Continue current renal multivitamin as indicated.  4) Provide nutrition education to pt family members/care givers as feasible for promoting adequate BG control and promoting adequate protein-energy intake.  5) Recommend continue daily weights.    Monitoring and Evaluation:     Continue to monitor Nutritional intake, Tolerance to diet prescription, weights, labs, skin integrity    RD remains available upon request and will follow up per protocol. Janneth Wang RD, CDN Pager: [phone]. Nutrition Follow Up Note  Patient seen for: malnutrition follow up on 6TOW.    Chart reviewed, events noted.   Pt is 72 yo F with PMHx of HTN, T2DM (last HgbA1c 7.4%), CAD s/p CABG,  non-dilated ICM (EF 20-25%), severe mitral regurgitation s/p mitral clip, severe pulm HTN, CKD, hypothyroidism, presented [facility] ED [facility] +acute on chronic HF, euvolemic currently. MERVIN on CKD, renal function fluctuates, nephrology following.   End stage cardiomyopathy, no further interventions per chart. Palliative following. Pt POCT still elevated, may need insulin drip, endocrinology following.     Source: electronic medical record, pt ([place]ic and English speaking-able to make needs known and answer RD questions appropriately, however pt is weak)    Diet : Consistent Carbohydrate (with evening snacks) + Low Sodium +1500ml Fluid Restriction + Halal    Patient reports: No acute GI distress reported. Last BM .    PO intake : Pt po intake improved since last follow up per chart. Consuming 50-75% of meals x 4 days and observed with ~75% of breakfast consumed this AM. Overall, it seems pt po intake varies, she will have several days of po intake varying <50% at times, but at other times 50-75% of meals consumed. Likely pt baseline po intake. Pt consumes food from home at times. Continue to provide 1 Sugar Free Health Shake daily, pt accepts this supplement.  Pt unable to participate in extensive verbal nutrition education in English and too weak too utilize translation phone at this time. RD remains available, no family at bedside at this time.     Source for PO intake: chart, pt     Enteral /Parenteral Nutrition: n/a    Daily Weight in k (-19), weight appears stable. Continue to monitor.  50.2 (-18), 51.2 (-17)    *: Nutrition Focused Physical Exam performed with pt's verbal consent with the following findings: no overt signs of muscle wasting to temporal, clavicle, acromion process, however noted with moderate muscle wasting to calves. No overt signs of fat loss to orbital fat pads or triceps.  48.5 (08-14) -> indicates 4.4 kG (8.3%) wt loss overall x ~1 month, appears most of which occurred within the past 10 days, ~3.7 kG (7.1%) lost since .   51.1 (-12), 49 (), 51.5 (08-10), 52 (), 52.2 (-), 51.7 (), 52.7 (), 52.4 (), 52.3 (), 52.9 ()    Pt height noted as 51", highly question accuracy based on visual appearance. Per chart, previous height noted as 59" on 2019 admit and would indicate BMI 22.7 based on wt 51 kG and is likely more accurate.    Pertinent Medications: MEDICATIONS  (STANDING):  acetaminophen  IVPB .. 1000 milliGRAM(s) IV Intermittent once  aspirin enteric coated 81 milliGRAM(s) Oral daily  atorvastatin 40 milliGRAM(s) Oral at bedtime  buMETAnide 3 milliGRAM(s) Oral every 12 hours  chlorhexidine 2% Cloths 1 Application(s) Topical daily  clopidogrel Tablet 75 milliGRAM(s) Oral daily  colchicine 0.6 milliGRAM(s) Oral daily  dextrose 5%. 1000 milliLiter(s) (50 mL/Hr) IV Continuous <Continuous>  dextrose 50% Injectable 12.5 Gram(s) IV Push once  dextrose 50% Injectable 25 Gram(s) IV Push once  dextrose 50% Injectable 25 Gram(s) IV Push once  docusate sodium 100 milliGRAM(s) Oral two times a day  heparin  Injectable 5000 Unit(s) SubCutaneous every 12 hours  hydrALAZINE 50 milliGRAM(s) Oral every 8 hours  insulin lispro (HumaLOG) corrective regimen sliding scale   SubCutaneous three times a day before meals  insulin lispro (HumaLOG) corrective regimen sliding scale   SubCutaneous at bedtime  insulin lispro Injectable (HumaLOG) 28 Unit(s) SubCutaneous three times a day before meals  insulin NPH/regular 70/30 Injectable 76 Unit(s) SubCutaneous before breakfast  insulin NPH/regular 70/30 Injectable 68 Unit(s) SubCutaneous before dinner  insulin NPH/regular 70/30 Injectable 70 Unit(s) SubCutaneous before breakfast  isosorbide   dinitrate Tablet (ISORDIL) 30 milliGRAM(s) Oral three times a day  levothyroxine 50 MICROGram(s) Oral daily  lidocaine   Patch 1 Patch Transdermal daily  melatonin 3 milliGRAM(s) Oral at bedtime  metoprolol succinate ER 25 milliGRAM(s) Oral daily  montelukast 10 milliGRAM(s) Oral daily  Nephro-enrico 1 Tablet(s) Oral daily  pantoprazole    Tablet 40 milliGRAM(s) Oral before breakfast  polyethylene glycol 3350 17 Gram(s) Oral daily  senna 2 Tablet(s) Oral at bedtime  spironolactone 25 milliGRAM(s) Oral daily    MEDICATIONS  (PRN):  acetaminophen   Tablet .. 650 milliGRAM(s) Oral every 6 hours PRN Mild Pain (1 - 3), Moderate Pain (4 - 6)  ALPRAZolam 0.25 milliGRAM(s) Oral three times a day PRN Anxiety  dextrose 40% Gel 15 Gram(s) Oral once PRN Blood Glucose LESS THAN 70 milliGRAM(s)/deciliter  glucagon  Injectable 1 milliGRAM(s) IntraMuscular once PRN Glucose LESS THAN 70 milligrams/deciliter  traMADol 25 milliGRAM(s) Oral every 6 hours PRN Moderate Pain (4 - 6)    Pertinent Labs:  @ 05:59: Na 134<L>, BUN 87<H>, Cr 1.93<H>, <H>, K+ 3.9, Phos --, Mg --, Alk Phos --, ALT/SGPT --, AST/SGOT --, HbA1c --    Finger Sticks:  POCT Blood Glucose.: 319 mg/dL ( @ 07:41)  POCT Blood Glucose.: 383 mg/dL ( @ 21:34)  POCT Blood Glucose.: 450 mg/dL ( @ 17:47)  POCT Blood Glucose.: 437 mg/dL ( @ 17:46)  POCT Blood Glucose.: 485 mg/dL ( @ 16:29)  POCT Blood Glucose.: 435 mg/dL ( @ 16:29)  POCT Blood Glucose.: 493 mg/dL ( @ 14:22)  POCT Blood Glucose.: 469 mg/dL ( @ 14:22)  POCT Blood Glucose.: 429 mg/dL ( @ 11:38)  POCT Blood Glucose.: 428 mg/dL (08-21 @ 11:37)      Skin per nursing documentation: no pressure injuries noted at this time  Edema: 1+ edema to R thumb and R index finger last noted     Estimated Needs:   [x] no change since previous assessment  [ ] recalculated:     Previous Nutrition Diagnosis: Predicted suboptimal energy intake -> Malnutrition, moderate (acute on chronic)  Nutrition Diagnosis is: ongoing, being addressed with oral nutrition supplement, micronutrient supplementation, liberalized diet. Pt medical condition likely precludes aggressive nutrition interventions at this time.    New Nutrition Diagnosis: n/a    Recommend  1) Continue to recommend Consistent Carbohydrate (with evening snacks) + Low Sodium + Halal diet. Fluids deferred to team.   2) Continue to provide SF Health Shake 1x/daily.  3) Continue current renal multivitamin as indicated.  4) Provide nutrition education to pt family members/care givers as feasible for promoting adequate BG control and promoting adequate protein-energy intake.  5) Recommend continue daily weights.    Monitoring and Evaluation:     Continue to monitor Nutritional intake, Tolerance to diet prescription, weights, labs, skin integrity. Recommend 7 day follow up protocol for this pt at this time.    RD remains available upon request and will follow up per protocol. Janneth Wang RD, CDN Pager: [phone]. Nutrition Follow Up Note  Patient seen for: malnutrition follow up on 6TOW.    Chart reviewed, events noted.   Pt is 74 yo F with PMHx of HTN, T2DM (last HgbA1c 7.4%), CAD s/p CABG,  non-dilated ICM (EF 20-25%), severe mitral regurgitation s/p mitral clip, severe pulm HTN, CKD, hypothyroidism, presented to ED after [facility] +acute on chronic HF, euvolemic currently. MERVIN on CKD, renal function fluctuates, nephrology following.   End stage cardiomyopathy, no further interventions per chart. Palliative following. Pt POCT still elevated, may need insulin drip, endocrinology following.     Source: electronic medical record, pt (Azeri and English speaking-able to make needs known and answer RD questions appropriately, however pt is weak)    Diet : Consistent Carbohydrate (with evening snacks) + Low Sodium +1500ml Fluid Restriction + Halal    Patient reports: No acute GI distress reported. Last BM .    PO intake : Pt po intake improved since last follow up per chart. Consuming 50-75% of meals x 4 days and observed with ~75% of breakfast consumed this AM. Overall, it seems pt po intake varies, she will have several days of po intake varying <50% at times, but at other times 50-75% of meals consumed. Likely pt baseline po intake. Pt consumes food from home at times. Continue to provide 1 Sugar Free Health Shake daily, pt accepts this supplement.  Pt unable to participate in extensive verbal nutrition education in English and too weak too utilize translation phone at this time. RD remains available, no family at bedside at this time.     Source for PO intake: chart, pt     Enteral /Parenteral Nutrition: n/a    Daily Weight in k (-19), weight appears stable. Continue to monitor.  50.2 (-18), 51.2 (-17)    *: Nutrition Focused Physical Exam performed with pt's verbal consent with the following findings: no overt signs of muscle wasting to temporal, clavicle, acromion process, however noted with moderate muscle wasting to calves. No overt signs of fat loss to orbital fat pads or triceps.  48.5 (08-14) -> indicates 4.4 kG (8.3%) wt loss overall x ~1 month, appears most of which occurred within the past 10 days, ~3.7 kG (7.1%) lost since .   51.1 (-12), 49 (), 51.5 (08-10), 52 (), 52.2 (-), 51.7 (), 52.7 (), 52.4 (), 52.3 (), 52.9 ()    Pt height noted as 51", highly question accuracy based on visual appearance. Per chart, previous height noted as 59" on 2019 admit and would indicate BMI 22.7 based on wt 51 kG and is likely more accurate.    Pertinent Medications: MEDICATIONS  (STANDING):  acetaminophen  IVPB .. 1000 milliGRAM(s) IV Intermittent once  aspirin enteric coated 81 milliGRAM(s) Oral daily  atorvastatin 40 milliGRAM(s) Oral at bedtime  buMETAnide 3 milliGRAM(s) Oral every 12 hours  chlorhexidine 2% Cloths 1 Application(s) Topical daily  clopidogrel Tablet 75 milliGRAM(s) Oral daily  colchicine 0.6 milliGRAM(s) Oral daily  dextrose 5%. 1000 milliLiter(s) (50 mL/Hr) IV Continuous <Continuous>  dextrose 50% Injectable 12.5 Gram(s) IV Push once  dextrose 50% Injectable 25 Gram(s) IV Push once  dextrose 50% Injectable 25 Gram(s) IV Push once  docusate sodium 100 milliGRAM(s) Oral two times a day  heparin  Injectable 5000 Unit(s) SubCutaneous every 12 hours  hydrALAZINE 50 milliGRAM(s) Oral every 8 hours  insulin lispro (HumaLOG) corrective regimen sliding scale   SubCutaneous three times a day before meals  insulin lispro (HumaLOG) corrective regimen sliding scale   SubCutaneous at bedtime  insulin lispro Injectable (HumaLOG) 28 Unit(s) SubCutaneous three times a day before meals  insulin NPH/regular 70/30 Injectable 76 Unit(s) SubCutaneous before breakfast  insulin NPH/regular 70/30 Injectable 68 Unit(s) SubCutaneous before dinner  insulin NPH/regular 70/30 Injectable 70 Unit(s) SubCutaneous before breakfast  isosorbide   dinitrate Tablet (ISORDIL) 30 milliGRAM(s) Oral three times a day  levothyroxine 50 MICROGram(s) Oral daily  lidocaine   Patch 1 Patch Transdermal daily  melatonin 3 milliGRAM(s) Oral at bedtime  metoprolol succinate ER 25 milliGRAM(s) Oral daily  montelukast 10 milliGRAM(s) Oral daily  Nephro-enrico 1 Tablet(s) Oral daily  pantoprazole    Tablet 40 milliGRAM(s) Oral before breakfast  polyethylene glycol 3350 17 Gram(s) Oral daily  senna 2 Tablet(s) Oral at bedtime  spironolactone 25 milliGRAM(s) Oral daily    MEDICATIONS  (PRN):  acetaminophen   Tablet .. 650 milliGRAM(s) Oral every 6 hours PRN Mild Pain (1 - 3), Moderate Pain (4 - 6)  ALPRAZolam 0.25 milliGRAM(s) Oral three times a day PRN Anxiety  dextrose 40% Gel 15 Gram(s) Oral once PRN Blood Glucose LESS THAN 70 milliGRAM(s)/deciliter  glucagon  Injectable 1 milliGRAM(s) IntraMuscular once PRN Glucose LESS THAN 70 milligrams/deciliter  traMADol 25 milliGRAM(s) Oral every 6 hours PRN Moderate Pain (4 - 6)    Pertinent Labs:  @ 05:59: Na 134<L>, BUN 87<H>, Cr 1.93<H>, <H>, K+ 3.9, Phos --, Mg --, Alk Phos --, ALT/SGPT --, AST/SGOT --, HbA1c --    Finger Sticks:  POCT Blood Glucose.: 319 mg/dL ( @ 07:41)  POCT Blood Glucose.: 383 mg/dL ( @ 21:34)  POCT Blood Glucose.: 450 mg/dL ( @ 17:47)  POCT Blood Glucose.: 437 mg/dL ( @ 17:46)  POCT Blood Glucose.: 485 mg/dL ( @ 16:29)  POCT Blood Glucose.: 435 mg/dL ( @ 16:29)  POCT Blood Glucose.: 493 mg/dL ( @ 14:22)  POCT Blood Glucose.: 469 mg/dL ( @ 14:22)  POCT Blood Glucose.: 429 mg/dL ( @ 11:38)  POCT Blood Glucose.: 428 mg/dL (08-21 @ 11:37)      Skin per nursing documentation: no pressure injuries noted at this time  Edema: 1+ edema to R thumb and R index finger last noted     Estimated Needs:   [x] no change since previous assessment  [ ] recalculated:     Previous Nutrition Diagnosis: Predicted suboptimal energy intake -> Malnutrition, moderate (acute on chronic)  Nutrition Diagnosis is: ongoing, being addressed with oral nutrition supplement, micronutrient supplementation, liberalized diet. Pt medical condition likely precludes aggressive nutrition interventions at this time.    New Nutrition Diagnosis: n/a    Recommend  1) Continue to recommend Consistent Carbohydrate (with evening snacks) + Low Sodium + Halal diet. Fluids deferred to team.   2) Continue to provide SF Health Shake 1x/daily.  3) Continue current renal multivitamin as indicated.  4) Provide nutrition education to pt family members/care givers as feasible for promoting adequate BG control and promoting adequate protein-energy intake.  5) Recommend continue daily weights.    Care plan achieved at this time, continue to follow up as indicated.    Monitoring and Evaluation:     Continue to monitor Nutritional intake, Tolerance to diet prescription, weights, labs, skin integrity. Recommend 7 day follow up protocol for this pt at this time.    RD remains available upon request and will follow up per protocol. Janneth Wang RD, CDN Pager: [phone].

## 2019-08-23 LAB
ALBUMIN SERPL ELPH-MCNC: 4.1 G/DL — SIGNIFICANT CHANGE UP (ref 3.3–5)
ALP SERPL-CCNC: 93 U/L — SIGNIFICANT CHANGE UP (ref 40–120)
ALT FLD-CCNC: 23 U/L — SIGNIFICANT CHANGE UP (ref 10–45)
ANION GAP SERPL CALC-SCNC: 17 MMOL/L — SIGNIFICANT CHANGE UP (ref 5–17)
APPEARANCE UR: CLEAR — SIGNIFICANT CHANGE UP
AST SERPL-CCNC: 23 U/L — SIGNIFICANT CHANGE UP (ref 10–40)
BILIRUB SERPL-MCNC: 0.2 MG/DL — SIGNIFICANT CHANGE UP (ref 0.2–1.2)
BILIRUB UR-MCNC: NEGATIVE — SIGNIFICANT CHANGE UP
BUN SERPL-MCNC: 92 MG/DL — HIGH (ref 7–23)
CALCIUM SERPL-MCNC: 9.7 MG/DL — SIGNIFICANT CHANGE UP (ref 8.4–10.5)
CHLORIDE SERPL-SCNC: 96 MMOL/L — SIGNIFICANT CHANGE UP (ref 96–108)
CO2 SERPL-SCNC: 19 MMOL/L — LOW (ref 22–31)
COLOR SPEC: SIGNIFICANT CHANGE UP
CREAT SERPL-MCNC: 2.1 MG/DL — HIGH (ref 0.5–1.3)
DIFF PNL FLD: NEGATIVE — SIGNIFICANT CHANGE UP
GLUCOSE BLDC GLUCOMTR-MCNC: 249 MG/DL — HIGH (ref 70–99)
GLUCOSE BLDC GLUCOMTR-MCNC: 293 MG/DL — HIGH (ref 70–99)
GLUCOSE BLDC GLUCOMTR-MCNC: 310 MG/DL — HIGH (ref 70–99)
GLUCOSE BLDC GLUCOMTR-MCNC: 312 MG/DL — HIGH (ref 70–99)
GLUCOSE BLDC GLUCOMTR-MCNC: 357 MG/DL — HIGH (ref 70–99)
GLUCOSE BLDC GLUCOMTR-MCNC: 372 MG/DL — HIGH (ref 70–99)
GLUCOSE SERPL-MCNC: 257 MG/DL — HIGH (ref 70–99)
GLUCOSE UR QL: ABNORMAL
HCT VFR BLD CALC: 27.5 % — LOW (ref 34.5–45)
HGB BLD-MCNC: 9 G/DL — LOW (ref 11.5–15.5)
INSULIN HUMAN IGE QN: 0.7 U/ML — HIGH
KETONES UR-MCNC: NEGATIVE — SIGNIFICANT CHANGE UP
LEUKOCYTE ESTERASE UR-ACNC: NEGATIVE — SIGNIFICANT CHANGE UP
MCHC RBC-ENTMCNC: 28.8 PG — SIGNIFICANT CHANGE UP (ref 27–34)
MCHC RBC-ENTMCNC: 32.7 GM/DL — SIGNIFICANT CHANGE UP (ref 32–36)
MCV RBC AUTO: 87.9 FL — SIGNIFICANT CHANGE UP (ref 80–100)
NITRITE UR-MCNC: NEGATIVE — SIGNIFICANT CHANGE UP
PH UR: 6 — SIGNIFICANT CHANGE UP (ref 5–8)
PLATELET # BLD AUTO: 382 K/UL — SIGNIFICANT CHANGE UP (ref 150–400)
POTASSIUM SERPL-MCNC: 3.8 MMOL/L — SIGNIFICANT CHANGE UP (ref 3.5–5.3)
POTASSIUM SERPL-SCNC: 3.8 MMOL/L — SIGNIFICANT CHANGE UP (ref 3.5–5.3)
PROT SERPL-MCNC: 8.2 G/DL — SIGNIFICANT CHANGE UP (ref 6–8.3)
PROT UR-MCNC: NEGATIVE — SIGNIFICANT CHANGE UP
RBC # BLD: 3.13 M/UL — LOW (ref 3.8–5.2)
RBC # FLD: 15.9 % — HIGH (ref 10.3–14.5)
SODIUM SERPL-SCNC: 132 MMOL/L — LOW (ref 135–145)
SP GR SPEC: 1.02 — SIGNIFICANT CHANGE UP (ref 1.01–1.02)
UROBILINOGEN FLD QL: SIGNIFICANT CHANGE UP
WBC # BLD: 8.78 K/UL — SIGNIFICANT CHANGE UP (ref 3.8–10.5)
WBC # FLD AUTO: 8.78 K/UL — SIGNIFICANT CHANGE UP (ref 3.8–10.5)

## 2019-08-23 RX ORDER — INSULIN LISPRO 100/ML
35 VIAL (ML) SUBCUTANEOUS
Refills: 0 | Status: DISCONTINUED | OUTPATIENT
Start: 2019-08-23 | End: 2019-08-25

## 2019-08-23 RX ORDER — LANOLIN ALCOHOL/MO/W.PET/CERES
2 CREAM (GRAM) TOPICAL ONCE
Refills: 0 | Status: COMPLETED | OUTPATIENT
Start: 2019-08-23 | End: 2019-08-23

## 2019-08-23 RX ADMIN — Medication 68 UNIT(S): at 17:19

## 2019-08-23 RX ADMIN — Medication 2 MILLIGRAM(S): at 01:36

## 2019-08-23 RX ADMIN — Medication 100 MILLIGRAM(S): at 17:22

## 2019-08-23 RX ADMIN — Medication 50 MICROGRAM(S): at 05:42

## 2019-08-23 RX ADMIN — Medication 4: at 08:19

## 2019-08-23 RX ADMIN — POLYETHYLENE GLYCOL 3350 17 GRAM(S): 17 POWDER, FOR SOLUTION ORAL at 11:47

## 2019-08-23 RX ADMIN — SPIRONOLACTONE 25 MILLIGRAM(S): 25 TABLET, FILM COATED ORAL at 05:42

## 2019-08-23 RX ADMIN — BUMETANIDE 3 MILLIGRAM(S): 0.25 INJECTION INTRAMUSCULAR; INTRAVENOUS at 17:22

## 2019-08-23 RX ADMIN — Medication 81 MILLIGRAM(S): at 11:47

## 2019-08-23 RX ADMIN — Medication 28 UNIT(S): at 12:18

## 2019-08-23 RX ADMIN — BUMETANIDE 3 MILLIGRAM(S): 0.25 INJECTION INTRAMUSCULAR; INTRAVENOUS at 05:41

## 2019-08-23 RX ADMIN — ISOSORBIDE DINITRATE 30 MILLIGRAM(S): 5 TABLET ORAL at 05:42

## 2019-08-23 RX ADMIN — Medication 28 UNIT(S): at 08:19

## 2019-08-23 RX ADMIN — Medication 76 UNIT(S): at 08:26

## 2019-08-23 RX ADMIN — Medication 50 MILLIGRAM(S): at 21:27

## 2019-08-23 RX ADMIN — ISOSORBIDE DINITRATE 30 MILLIGRAM(S): 5 TABLET ORAL at 21:28

## 2019-08-23 RX ADMIN — Medication 50 MILLIGRAM(S): at 13:45

## 2019-08-23 RX ADMIN — LIDOCAINE 1 PATCH: 4 CREAM TOPICAL at 07:00

## 2019-08-23 RX ADMIN — ATORVASTATIN CALCIUM 40 MILLIGRAM(S): 80 TABLET, FILM COATED ORAL at 21:27

## 2019-08-23 RX ADMIN — MONTELUKAST 10 MILLIGRAM(S): 4 TABLET, CHEWABLE ORAL at 11:47

## 2019-08-23 RX ADMIN — Medication 1 TABLET(S): at 11:47

## 2019-08-23 RX ADMIN — LIDOCAINE 1 PATCH: 4 CREAM TOPICAL at 21:27

## 2019-08-23 RX ADMIN — LIDOCAINE 1 PATCH: 4 CREAM TOPICAL at 09:00

## 2019-08-23 RX ADMIN — Medication 35 UNIT(S): at 17:11

## 2019-08-23 RX ADMIN — TRAMADOL HYDROCHLORIDE 25 MILLIGRAM(S): 50 TABLET ORAL at 00:45

## 2019-08-23 RX ADMIN — ISOSORBIDE DINITRATE 30 MILLIGRAM(S): 5 TABLET ORAL at 13:46

## 2019-08-23 RX ADMIN — Medication 0.6 MILLIGRAM(S): at 11:47

## 2019-08-23 RX ADMIN — HEPARIN SODIUM 5000 UNIT(S): 5000 INJECTION INTRAVENOUS; SUBCUTANEOUS at 05:42

## 2019-08-23 RX ADMIN — HEPARIN SODIUM 5000 UNIT(S): 5000 INJECTION INTRAVENOUS; SUBCUTANEOUS at 17:22

## 2019-08-23 RX ADMIN — Medication 25 MILLIGRAM(S): at 05:41

## 2019-08-23 RX ADMIN — PANTOPRAZOLE SODIUM 40 MILLIGRAM(S): 20 TABLET, DELAYED RELEASE ORAL at 05:42

## 2019-08-23 RX ADMIN — Medication 4: at 12:18

## 2019-08-23 RX ADMIN — Medication 3 MILLIGRAM(S): at 22:46

## 2019-08-23 RX ADMIN — Medication 5: at 17:10

## 2019-08-23 RX ADMIN — CLOPIDOGREL BISULFATE 75 MILLIGRAM(S): 75 TABLET, FILM COATED ORAL at 11:47

## 2019-08-23 RX ADMIN — Medication 50 MILLIGRAM(S): at 05:42

## 2019-08-23 RX ADMIN — Medication 1: at 21:35

## 2019-08-23 NOTE — PROGRESS NOTE ADULT - SUBJECTIVE AND OBJECTIVE BOX
[facility]   MD GONZALEZ SHAH D.O, PA    TELEPHONE NUMBERS:  OFFICE: [phone]  DR. SANTOYO CELL: [phone]  JUSTEN FIELD CELL: [phone]  DR. RIDER CELL:  [phone]    RENAL FOLLOW UP NOTE  --------------------------------------------------------------------------------  HPI: Pt seen and examined at bedside. Pt admits to weakness. States she walked around today and experienced SOB. No SOB at rest.   Denies SOB, chest pain     PAST HISTORY  --------------------------------------------------------------------------------  No significant changes to PMH, PSH, FHx, SHx, unless otherwise noted    ALLERGIES & MEDICATIONS  --------------------------------------------------------------------------------  Allergies    azithromycin (Hives; Pruritus)    Intolerances      Standing Inpatient Medications  acetaminophen  IVPB .. 1000 milliGRAM(s) IV Intermittent once  aspirin enteric coated 81 milliGRAM(s) Oral daily  atorvastatin 40 milliGRAM(s) Oral at bedtime  buMETAnide 3 milliGRAM(s) Oral every 12 hours  chlorhexidine 2% Cloths 1 Application(s) Topical daily  clopidogrel Tablet 75 milliGRAM(s) Oral daily  colchicine 0.6 milliGRAM(s) Oral daily  dextrose 5%. 1000 milliLiter(s) IV Continuous <Continuous>  dextrose 50% Injectable 12.5 Gram(s) IV Push once  dextrose 50% Injectable 25 Gram(s) IV Push once  dextrose 50% Injectable 25 Gram(s) IV Push once  docusate sodium 100 milliGRAM(s) Oral two times a day  heparin  Injectable 5000 Unit(s) SubCutaneous every 12 hours  hydrALAZINE 50 milliGRAM(s) Oral every 8 hours  insulin lispro (HumaLOG) corrective regimen sliding scale   SubCutaneous three times a day before meals  insulin lispro (HumaLOG) corrective regimen sliding scale   SubCutaneous at bedtime  insulin lispro Injectable (HumaLOG) 35 Unit(s) SubCutaneous three times a day before meals  insulin NPH/regular 70/30 Injectable 76 Unit(s) SubCutaneous before breakfast  insulin NPH/regular 70/30 Injectable 68 Unit(s) SubCutaneous before dinner  isosorbide   dinitrate Tablet (ISORDIL) 30 milliGRAM(s) Oral three times a day  lactated ringers. 1000 milliLiter(s) IV Continuous <Continuous>  levothyroxine 50 MICROGram(s) Oral daily  lidocaine   Patch 1 Patch Transdermal daily  melatonin 3 milliGRAM(s) Oral at bedtime  metoprolol succinate ER 25 milliGRAM(s) Oral daily  montelukast 10 milliGRAM(s) Oral daily  Nephro-enrico 1 Tablet(s) Oral daily  pantoprazole    Tablet 40 milliGRAM(s) Oral before breakfast  polyethylene glycol 3350 17 Gram(s) Oral daily  senna 2 Tablet(s) Oral at bedtime  spironolactone 25 milliGRAM(s) Oral daily    PRN Inpatient Medications  acetaminophen   Tablet .. 650 milliGRAM(s) Oral every 6 hours PRN  ALPRAZolam 0.25 milliGRAM(s) Oral three times a day PRN  dextrose 40% Gel 15 Gram(s) Oral once PRN  glucagon  Injectable 1 milliGRAM(s) IntraMuscular once PRN  traMADol 25 milliGRAM(s) Oral every 6 hours PRN      REVIEW OF SYSTEMS  --------------------------------------------------------------------------------  General: no fever  CVS: no chest pain  RESP: no sob, no cough  ABD: no abdominal pain  : no dysuria,  MSK: no edema     VITALS/PHYSICAL EXAM  --------------------------------------------------------------------------------  T(C): 36.6 (08-23-19 @ 13:46), Max: 36.8 (08-23-19 @ 04:31)  HR: 82 (08-23-19 @ 17:08) (77 - 92)  BP: 107/62 (08-23-19 @ 17:08) (101/57 - 114/68)  RR: 18 (08-23-19 @ 17:08) (18 - 18)  SpO2: 98% (08-23-19 @ 17:08) (97% - 100%)  Wt(kg): --        08-22-19 @ 07:01  -  08-23-19 @ 07:00  --------------------------------------------------------  IN: 1175 mL / OUT: 1200 mL / NET: -25 mL    08-23-19 @ 07:01  -  08-23-19 @ 17:23  --------------------------------------------------------  IN: 450 mL / OUT: 650 mL / NET: -200 mL      Physical Exam:  	Gen: NAD  	HEENT: MMM  	Pulm: CTA B/L  	CV: S1S2  	Abd: Soft, +BS  	Ext: No LE edema B/L                      Neuro: Awake   	Skin: Warm and Dry       LABS/STUDIES  --------------------------------------------------------------------------------              9.0    8.78  >-----------<  382      [08-23-19 @ 08:43]              27.5     132  |  96  |  92  ----------------------------<  257      [08-23-19 @ 06:47]  3.8   |  19  |  2.10        Ca     9.7     [08-23-19 @ 06:47]      Mg     2.3     [08-22-19 @ 11:47]      Phos  3.9     [08-22-19 @ 11:47]    TPro  8.2  /  Alb  4.1  /  TBili  0.2  /  DBili  x   /  AST  23  /  ALT  23  /  AlkPhos  93  [08-23-19 @ 06:47]    Creatinine Trend:  SCr 2.10 [08-23 @ 06:47]  SCr 2.04 [08-22 @ 11:47]  SCr 1.93 [08-22 @ 05:59]  SCr 2.02 [08-21 @ 06:52]  SCr 1.89 [08-20 @ 07:17]    Urinalysis - [08-22-19 @ 16:43]      Color Light Yellow / Appearance Clear / SG 1.020 / pH 6.0      Gluc >=1000 mg/dL / Ketone Negative  / Bili Negative / Urobili <2 mg/dL       Blood Negative / Protein Negative / Leuk Est Negative / Nitrite Negative      RBC  / WBC  / Hyaline  / Gran  / Sq Epi  / Non Sq Epi  / Bacteria       Iron 42, TIBC 348, %sat 12      [07-05-19 @ 22:32]  Ferritin 130      [07-05-19 @ 22:32]  .4 (Ca --)      [04-25-19 @ 05:45]   --  PTH 43.55 (Ca --)      [09-10-18 @ 07:15]   --  PTH 36.60 (Ca --)      [09-08-18 @ 03:15]   --  Vitamin D (25OH) 25.9      [05-01-19 @ 04:00]  HbA1c 7.4      [07-05-19 @ 22:32]  TSH 2.00      [07-05-19 @ 22:32]  Lipid: chol 148, , HDL 35,       [06-01-19 @ 08:00]

## 2019-08-23 NOTE — PROGRESS NOTE ADULT - ASSESSMENT
Assessment  DMT2: 73y Female with DM T2 with hyperglycemia on insulin, dose was increased yesterday, sugars slightly better still not at target, she is eating partial meals.  CAD: S/P cabg On medications, stable, monitored.  Gout: Rheum consult done, on colchiceine still has ankle pain.  Hypothyroidism: synthroid 50 mcg po daily, asymptomatic.  HTN: Controlled, On med.  HLD; on statin  CKD: Monitor labs/BMP        Plan:   DMT2:  Will increase Humalog to 35u before each lovely, continue mixed insulin. If sugars remain high may consider U500 insulin, will FU.  Will continue monitoring FS, log, and follow up. Spoke with patient and primary team.  CAD: S/P cabg On medications, stable, monitored.  Hypothyroidism: Continue synthroid 50 mcg po daily, asymptomatic. F/U with TFTs as outpatient  HTN: Continue treatment, monitoring, Primary team FU  HLD; on statin  CKD: Continue monitoring labs, renal FU

## 2019-08-23 NOTE — CHART NOTE - NSCHARTNOTEFT_GEN_A_CORE
Spoke with attending Dr. Salazar, requesting second opinion from house endocrine. Patient with extremely high insulin requirements inpatient. Currently being seen by private endocrinologist Dr. Jillian Banks. Recommend d/c 70/30 insulin, start Lantus 50 units BID and Humalog 35 units TID with moderate correction scale. Full consult to follow in AM.    Jair Palmer MD   Pager # [phone]  On evenings and weekends, please call the office at [phone] or page endocrine fellow on call.

## 2019-08-23 NOTE — PROGRESS NOTE ADULT - SUBJECTIVE AND OBJECTIVE BOX
Chief complaint  Patient is a 73y old  Female who presents with a chief complaint of Hypoxia, SOB (23 Aug 2019 09:55)   Review of systems  Patient in bed, looks comfortable, no fever, no hypoglycemia.    Labs and Fingersticks  CAPILLARY BLOOD GLUCOSE      POCT Blood Glucose.: 310 mg/dL (23 Aug 2019 12:12)  POCT Blood Glucose.: 312 mg/dL (23 Aug 2019 07:54)  POCT Blood Glucose.: 249 mg/dL (23 Aug 2019 06:27)  POCT Blood Glucose.: 372 mg/dL (23 Aug 2019 00:09)  POCT Blood Glucose.: 396 mg/dL (22 Aug 2019 23:08)  POCT Blood Glucose.: 391 mg/dL (22 Aug 2019 22:10)  POCT Blood Glucose.: 371 mg/dL (22 Aug 2019 21:06)  POCT Blood Glucose.: 383 mg/dL (22 Aug 2019 20:59)  POCT Blood Glucose.: 385 mg/dL (22 Aug 2019 20:06)  POCT Blood Glucose.: 359 mg/dL (22 Aug 2019 17:16)      Anion Gap, Serum: 17 (08-23 @ 06:47)  Anion Gap, Serum: 18 <H> (08-22 @ 11:47)  Anion Gap, Serum: 17 (08-22 @ 05:59)      Calcium, Total Serum: 9.7 (08-23 @ 06:47)  Calcium, Total Serum: 10.3 (08-22 @ 11:47)  Calcium, Total Serum: 9.6 (08-22 @ 05:59)  Albumin, Serum: 4.1 (08-23 @ 06:47)  Albumin, Serum: 4.5 (08-22 @ 11:47)    Alanine Aminotransferase (ALT/SGPT): 23 (08-23 @ 06:47)  Alanine Aminotransferase (ALT/SGPT): 20 (08-22 @ 11:47)  Alkaline Phosphatase, Serum: 93 (08-23 @ 06:47)  Alkaline Phosphatase, Serum: 101 (08-22 @ 11:47)  Aspartate Aminotransferase (AST/SGOT): 23 (08-23 @ 06:47)  Aspartate Aminotransferase (AST/SGOT): 21 (08-22 @ 11:47)        08-23    132<L>  |  96  |  92<H>  ----------------------------<  257<H>  3.8   |  19<L>  |  2.10<H>    Ca    9.7      23 Aug 2019 06:47  Phos  3.9     08-22  Mg     2.3     08-22    TPro  8.2  /  Alb  4.1  /  TBili  0.2  /  DBili  x   /  AST  23  /  ALT  23  /  AlkPhos  93  08-23                        9.0    8.78  )-----------( 382      ( 23 Aug 2019 08:43 )             27.5     Medications  MEDICATIONS  (STANDING):  acetaminophen  IVPB .. 1000 milliGRAM(s) IV Intermittent once  aspirin enteric coated 81 milliGRAM(s) Oral daily  atorvastatin 40 milliGRAM(s) Oral at bedtime  buMETAnide 3 milliGRAM(s) Oral every 12 hours  chlorhexidine 2% Cloths 1 Application(s) Topical daily  clopidogrel Tablet 75 milliGRAM(s) Oral daily  colchicine 0.6 milliGRAM(s) Oral daily  dextrose 5%. 1000 milliLiter(s) (50 mL/Hr) IV Continuous <Continuous>  dextrose 50% Injectable 12.5 Gram(s) IV Push once  dextrose 50% Injectable 25 Gram(s) IV Push once  dextrose 50% Injectable 25 Gram(s) IV Push once  docusate sodium 100 milliGRAM(s) Oral two times a day  heparin  Injectable 5000 Unit(s) SubCutaneous every 12 hours  hydrALAZINE 50 milliGRAM(s) Oral every 8 hours  insulin lispro (HumaLOG) corrective regimen sliding scale   SubCutaneous three times a day before meals  insulin lispro (HumaLOG) corrective regimen sliding scale   SubCutaneous at bedtime  insulin lispro Injectable (HumaLOG) 35 Unit(s) SubCutaneous three times a day before meals  insulin NPH/regular 70/30 Injectable 76 Unit(s) SubCutaneous before breakfast  insulin NPH/regular 70/30 Injectable 68 Unit(s) SubCutaneous before dinner  isosorbide   dinitrate Tablet (ISORDIL) 30 milliGRAM(s) Oral three times a day  lactated ringers. 1000 milliLiter(s) (75 mL/Hr) IV Continuous <Continuous>  levothyroxine 50 MICROGram(s) Oral daily  lidocaine   Patch 1 Patch Transdermal daily  melatonin 3 milliGRAM(s) Oral at bedtime  metoprolol succinate ER 25 milliGRAM(s) Oral daily  montelukast 10 milliGRAM(s) Oral daily  Nephro-enrico 1 Tablet(s) Oral daily  pantoprazole    Tablet 40 milliGRAM(s) Oral before breakfast  polyethylene glycol 3350 17 Gram(s) Oral daily  senna 2 Tablet(s) Oral at bedtime  spironolactone 25 milliGRAM(s) Oral daily      Physical Exam  General: Patient comfortable in bed  Vital Signs Last 12 Hrs  T(F): 98.2 (08-23-19 @ 04:31), Max: 98.2 (08-23-19 @ 04:31)  HR: 77 (08-23-19 @ 05:40) (77 - 82)  BP: 108/63 (08-23-19 @ 05:40) (101/57 - 108/63)  BP(mean): --  RR: 18 (08-23-19 @ 04:31) (18 - 18)  SpO2: 98% (08-23-19 @ 04:31) (98% - 98%)  Neck: No palpable thyroid nodules.  CVS: S1S2, No murmurs  Respiratory: No wheezing, no crepitations  GI: Abdomen soft, bowel sounds positive  Musculoskeletal:  edema lower extremities.   Skin: No skin rashes, no ecchymosis    Diagnostics    Cortisol AM, Serum: AM Sched. Collection: 18-Aug-2019 06:00 (08-17 @ 12:14)  Cortisol AM, Serum: AM Sched. Collection: 19-Aug-2019 06:00 (08-18 @ 12:59)

## 2019-08-23 NOTE — PROGRESS NOTE ADULT - ASSESSMENT
Pt is a 72 yo woman with PMHx of HTN, T2DM, CAD s/p CABG (LIMA to LAD, SVG to OM, SVG to PDA 2014 at [facility]), non-dilated ICM (EF 20-25%), severe mitral regurgitation s/p mitral clip (6/13/19 Dr. Newman), severe pulm HTN, CKD, hypothyroidism admitted with worsening SOB.    A/P:  MERVIN  Likely due to cardiogenic shock  Renal function remains stable at baseline but subject to fluctuations based on Renal Perfusion.  Pt currently on Bumex 3 mg PO Q12 and spironolactone 25mg daily  - Scr currently stable   - Continue diuretic therapy per cardiology.  - Optimize glucose control  - Monitor renal function   - Avoid further nephrotoxics, NSAIDS, RCA    Hyponatremia  in the setting of hyperglycemia.   Currently stable. Monitor   Optimize glucose control    CKD stage 4:  baseline Scr 1.8-2.0. Scr stable today.   Renal function fluctuates sec to CHF  Monitor renal function at present    SOB:  in setting of HF. Improved on diuretic therapy.   Continue diuretics per cardiology    Acidosis   Sec to Renal failure  Monitor serum Co2 at present    Hypokalemia:  in the setting of diuretic therapy.   Serum potassium stable      Anemia:   Hb stable  Pt with iron deficiency anemia.

## 2019-08-23 NOTE — PROGRESS NOTE ADULT - SUBJECTIVE AND OBJECTIVE BOX
CHIEF COMPLAINT:    SUBJECTIVE:     REVIEW OF SYSTEMS:    CONSTITUTIONAL: (  )  weakness,  (  ) fevers or chills  EYES/ENT: (  )visual changes;     NECK: (  ) pain or stiffness  RESPIRATORY:   (  )cough, wheezing, hemoptysis;  (  ) shortness of breath  CARDIOVASCULAR:  (  )chest pain or palpitations  GASTROINTESTINAL:   (  )abdominal or epigastric pain.  (  ) nausea, vomiting, or hematemesis;   (   ) diarrhea or constipation.   GENITOURINARY:   (    ) dysuria, frequency or hematuria  NEUROLOGICAL:  (   ) numbness or weakness   All other review of systems is negative unless indicated above    Vital Signs Last 24 Hrs  T(C): 36.8 (23 Aug 2019 04:31), Max: 36.8 (23 Aug 2019 04:31)  T(F): 98.2 (23 Aug 2019 04:31), Max: 98.2 (23 Aug 2019 04:31)  HR: 77 (23 Aug 2019 05:40) (77 - 90)  BP: 108/63 (23 Aug 2019 05:40) (101/57 - 118/68)  BP(mean): --  RR: 18 (23 Aug 2019 04:31) (18 - 20)  SpO2: 98% (23 Aug 2019 04:31) (97% - 100%)    I&O's Summary    22 Aug 2019 07:01  -  23 Aug 2019 07:00  --------------------------------------------------------  IN: 1175 mL / OUT: 1200 mL / NET: -25 mL        CAPILLARY BLOOD GLUCOSE      POCT Blood Glucose.: 312 mg/dL (23 Aug 2019 07:54)  POCT Blood Glucose.: 249 mg/dL (23 Aug 2019 06:27)  POCT Blood Glucose.: 372 mg/dL (23 Aug 2019 00:09)  POCT Blood Glucose.: 396 mg/dL (22 Aug 2019 23:08)  POCT Blood Glucose.: 391 mg/dL (22 Aug 2019 22:10)  POCT Blood Glucose.: 371 mg/dL (22 Aug 2019 21:06)  POCT Blood Glucose.: 383 mg/dL (22 Aug 2019 20:59)  POCT Blood Glucose.: 385 mg/dL (22 Aug 2019 20:06)  POCT Blood Glucose.: 359 mg/dL (22 Aug 2019 17:16)  POCT Blood Glucose.: 233 mg/dL (22 Aug 2019 13:02)  POCT Blood Glucose.: 353 mg/dL (22 Aug 2019 11:36)  POCT Blood Glucose.: 393 mg/dL (22 Aug 2019 10:25)      PHYSICAL EXAM:    Constitutional:  (   ) NAD,   (   )awake and alert  HEENT: PERR, EOMI,    Neck: Soft and supple, No LAD, No JVD  Respiratory:  (    Breath sounds are clear bilaterally,    (   ) wheezing, rales or rhonchi  Cardiovascular:     (   )S1 and S2, regular rate and rhythm, no Murmurs, gallops or rubs  Gastrointestinal:  (   )Bowel Sounds present, soft,   (  )nontender, nondistended,    Extremities:    (  ) peripheral edema  Vascular: 2+ peripheral pulses  Neurological:    (    )A/O x 3,   (  ) focal deficits  Musculoskeletal:    (   )  normal strength b/l upper  (     ) normal  lower extremities  Skin: No rashes    MEDICATIONS:  MEDICATIONS  (STANDING):  acetaminophen  IVPB .. 1000 milliGRAM(s) IV Intermittent once  aspirin enteric coated 81 milliGRAM(s) Oral daily  atorvastatin 40 milliGRAM(s) Oral at bedtime  buMETAnide 3 milliGRAM(s) Oral every 12 hours  chlorhexidine 2% Cloths 1 Application(s) Topical daily  clopidogrel Tablet 75 milliGRAM(s) Oral daily  colchicine 0.6 milliGRAM(s) Oral daily  dextrose 5%. 1000 milliLiter(s) (50 mL/Hr) IV Continuous <Continuous>  dextrose 50% Injectable 12.5 Gram(s) IV Push once  dextrose 50% Injectable 25 Gram(s) IV Push once  dextrose 50% Injectable 25 Gram(s) IV Push once  docusate sodium 100 milliGRAM(s) Oral two times a day  heparin  Injectable 5000 Unit(s) SubCutaneous every 12 hours  hydrALAZINE 50 milliGRAM(s) Oral every 8 hours  insulin lispro (HumaLOG) corrective regimen sliding scale   SubCutaneous three times a day before meals  insulin lispro (HumaLOG) corrective regimen sliding scale   SubCutaneous at bedtime  insulin lispro Injectable (HumaLOG) 28 Unit(s) SubCutaneous three times a day before meals  insulin NPH/regular 70/30 Injectable 76 Unit(s) SubCutaneous before breakfast  insulin NPH/regular 70/30 Injectable 68 Unit(s) SubCutaneous before dinner  isosorbide   dinitrate Tablet (ISORDIL) 30 milliGRAM(s) Oral three times a day  lactated ringers. 1000 milliLiter(s) (75 mL/Hr) IV Continuous <Continuous>  levothyroxine 50 MICROGram(s) Oral daily  lidocaine   Patch 1 Patch Transdermal daily  melatonin 3 milliGRAM(s) Oral at bedtime  metoprolol succinate ER 25 milliGRAM(s) Oral daily  montelukast 10 milliGRAM(s) Oral daily  Nephro-enrico 1 Tablet(s) Oral daily  pantoprazole    Tablet 40 milliGRAM(s) Oral before breakfast  polyethylene glycol 3350 17 Gram(s) Oral daily  senna 2 Tablet(s) Oral at bedtime  spironolactone 25 milliGRAM(s) Oral daily      LABS: All Labs Reviewed:                        9.0    8.78  )-----------( 382      ( 23 Aug 2019 08:43 )             27.5     08-23    132<L>  |  96  |  92<H>  ----------------------------<  257<H>  3.8   |  19<L>  |  2.10<H>    Ca    9.7      23 Aug 2019 06:47  Phos  3.9     08-22  Mg     2.3     08-22    TPro  8.2  /  Alb  4.1  /  TBili  0.2  /  DBili  x   /  AST  23  /  ALT  23  /  AlkPhos  93  08-23          Blood Culture:   Urine Culture      RADIOLOGY/EKG:    ASSESSMENT AND PLAN:    DVT PPX:    ADVANCED DIRECTIVE:    DISPOSITION: CHIEF COMPLAINT:  ·	patient seen and examined in the bed awake and verbal without SOB  but complained of weakness.  ·	still with elevated blood sugar but it less than 400 now but only partial improvement  SUBJECTIVE:     REVIEW OF SYSTEMS:    CONSTITUTIONAL: ( x )  weakness,  (  ) fevers or chills  EYES/ENT: (  )visual changes;     NECK: (  ) pain or stiffness  RESPIRATORY:   (  )cough, wheezing, hemoptysis;  (  ) shortness of breath  CARDIOVASCULAR:  (  )chest pain or palpitations  GASTROINTESTINAL:   (  )abdominal or epigastric pain.  (  ) nausea, vomiting, or hematemesis;   (   ) diarrhea or constipation.   GENITOURINARY:   (    ) dysuria, frequency or hematuria  NEUROLOGICAL:  (   ) numbness or weakness   All other review of systems is negative unless indicated above    Vital Signs Last 24 Hrs  T(C): 36.8 (23 Aug 2019 04:31), Max: 36.8 (23 Aug 2019 04:31)  T(F): 98.2 (23 Aug 2019 04:31), Max: 98.2 (23 Aug 2019 04:31)  HR: 77 (23 Aug 2019 05:40) (77 - 90)  BP: 108/63 (23 Aug 2019 05:40) (101/57 - 118/68)  BP(mean): --  RR: 18 (23 Aug 2019 04:31) (18 - 20)  SpO2: 98% (23 Aug 2019 04:31) (97% - 100%)    I&O's Summary    22 Aug 2019 07:01  -  23 Aug 2019 07:00  --------------------------------------------------------  IN: 1175 mL / OUT: 1200 mL / NET: -25 mL        CAPILLARY BLOOD GLUCOSE      POCT Blood Glucose.: 312 mg/dL (23 Aug 2019 07:54)  POCT Blood Glucose.: 249 mg/dL (23 Aug 2019 06:27)  POCT Blood Glucose.: 372 mg/dL (23 Aug 2019 00:09)  POCT Blood Glucose.: 396 mg/dL (22 Aug 2019 23:08)                      POCT Blood Glucose.: 319 mg/dL (22 Aug 2019 07:41)  POCT Blood Glucose.: 383 mg/dL (21 Aug 2019 21:34)  POCT Blood Glucose.: 450 mg/dL (21 Aug 2019 17:47)  POCT Blood Glucose.: 437 mg/dL (21 Aug 2019 17:46)  POCT Blood Glucose.: 485 mg/dL (21 Aug 2019 16:29)  POCT Blood Glucose.: 435 mg/dL (21 Aug 2019 16:29)  POCT Blood Glucose.: 493 mg/dL (21 Aug 2019 14:22)  POCT Blood Glucose.: 469 mg/dL (21 Aug 2019 14:22)  POCT Blood Glucose.: 429 mg/dL (21 Aug 2019 11:38)  POCT Blood Glucose.: 428 mg/dL (21 Aug 2019 11:37)    POCT Blood Glucose.: 391 mg/dL (22 Aug 2019 22:10)  POCT Blood Glucose.: 371 mg/dL (22 Aug 2019 21:06)  POCT Blood Glucose.: 383 mg/dL (22 Aug 2019 20:59)  POCT Blood Glucose.: 385 mg/dL (22 Aug 2019 20:06)  POCT Blood Glucose.: 359 mg/dL (22 Aug 2019 17:16)  POCT Blood Glucose.: 233 mg/dL (22 Aug 2019 13:02)  POCT Blood Glucose.: 353 mg/dL (22 Aug 2019 11:36)  POCT Blood Glucose.: 393 mg/dL (22 Aug 2019 10:25)      PHYSICAL EXAM:    Constitutional:  ( x  ) NAD,   (   )awake and alert  HEENT: PERR, EOMI,    Neck: Soft and supple, No LAD, No JVD  Respiratory:  (  x  Breath sounds are clear bilaterally,    (   ) wheezing, rales or rhonchi  Cardiovascular:     (  x )S1 and S2, regular rate    Gastrointestinal:  (  x )Bowel Sounds present, soft,   (  )nontender, nondistended,    Extremities:    (  ) peripheral edema  Vascular: 2+ peripheral pulses  Neurological:    (  xyou are very smart and will tell her brother will tell her brother  )A/O x 3,   (  ) focal deficits  Musculoskeletal:    (   )  normal strength b/l upper  (     ) normal  lower extremities  Skin: No rashes    MEDICATIONS:  MEDICATIONS  (STANDING):  acetaminophen  IVPB .. 1000 milliGRAM(s) IV Intermittent once  aspirin enteric coated 81 milliGRAM(s) Oral daily  atorvastatin 40 milliGRAM(s) Oral at bedtime  buMETAnide 3 milliGRAM(s) Oral every 12 hours  chlorhexidine 2% Cloths 1 Application(s) Topical daily  clopidogrel Tablet 75 milliGRAM(s) Oral daily  colchicine 0.6 milliGRAM(s) Oral daily  dextrose 5%. 1000 milliLiter(s) (50 mL/Hr) IV Continuous <Continuous>  dextrose 50% Injectable 12.5 Gram(s) IV Push once  dextrose 50% Injectable 25 Gram(s) IV Push once  dextrose 50% Injectable 25 Gram(s) IV Push once  docusate sodium 100 milliGRAM(s) Oral two times a day  heparin  Injectable 5000 Unit(s) SubCutaneous every 12 hours  hydrALAZINE 50 milliGRAM(s) Oral every 8 hours  insulin lispro (HumaLOG) corrective regimen sliding scale   SubCutaneous three times a day before meals  insulin lispro (HumaLOG) corrective regimen sliding scale   SubCutaneous at bedtime  insulin lispro Injectable (HumaLOG) 28 Unit(s) SubCutaneous three times a day before meals  insulin NPH/regular 70/30 Injectable 76 Unit(s) SubCutaneous before breakfast  insulin NPH/regular 70/30 Injectable 68 Unit(s) SubCutaneous before dinner  isosorbide   dinitrate Tablet (ISORDIL) 30 milliGRAM(s) Oral three times a day  lactated ringers. 1000 milliLiter(s) (75 mL/Hr) IV Continuous <Continuous>  levothyroxine 50 MICROGram(s) Oral daily  lidocaine   Patch 1 Patch Transdermal daily  melatonin 3 milliGRAM(s) Oral at bedtime  metoprolol succinate ER 25 milliGRAM(s) Oral daily  montelukast 10 milliGRAM(s) Oral daily  Nephro-enrico 1 Tablet(s) Oral daily  pantoprazole    Tablet 40 milliGRAM(s) Oral before breakfast  polyethylene glycol 3350 17 Gram(s) Oral daily  senna 2 Tablet(s) Oral at bedtime  spironolactone 25 milliGRAM(s) Oral daily      LABS: All Labs Reviewed:                        9.0    8.78  )-----------( 382      ( 23 Aug 2019 08:43 )             27.5     08-23    132<L>  |  96  |  92<H>  ----------------------------<  257<H>  3.8   |  19<L>  |  2.10<H>    Ca    9.7      23 Aug 2019 06:47  Phos  3.9     08-22  Mg     2.3     08-22    TPro  8.2  /  Alb  4.1  /  TBili  0.2  /  DBili  x   /  AST  23  /  ALT  23  /  AlkPhos  93  08-23          Blood Culture:   Urine Culture      RADIOLOGY/EKG:    ASSESSMENT AND PLAN:  72 yo woman with PMHx of HTN, T2DM (A1c 7.4%), CAD s/p CABG (LIMA to LAD, SVG to OM, SVG to PDA 2014 at [facility]), non-dilated ICM (EF 20-25%), severe mitral regurgitation s/p mitral clip (6/13/19 Dr. Newman), severe pulm HTN, CKD, hypothyroidism, who is presenting to ED after experiencing worsening SOB and intermittent chest [facility]. Of note, pt was recently discharged on 6/19 after having mitral clip procedure completed. She initially required BiPAP with improvement in her respiratory status following diuresis. Initiated on vasopressors and inotropes in CCU, which were subsequently weaned off. Infectious work up was negative.    #HFrEF likely 2/2 ICM with MR s/p alonso clip  -  continue with Bumex 3 mg twice daily        - continue hydralazine 50 mg TID and isordil 30 TID  - continue spironolactone 25 mg  - continue Metoprolol Succinate 25 mg PO Daily   - No further cardiac testing at this time.      #CAD  - continue ASA and statin   - Pt with chest pain, but also with end stage cardiomyopathy, coronary disease, had MitraClip and no further intervention feasible. She should not have trops checked.     #SVT - likely Atrial Tach vs. Atrial Flutter   - Amio initially not given due to elevated LFTs likely in the setting of congestion.   - recheck LFTs.   - Monitor on tele.   - continue metoprolol at current dose as above  - If pt has SVT again can consider amio   #diabetes     on   NPH 7030  76 units before breakfast and 68 units before dinner and 35 regular insulin 3 times a day with coverage discussed with the medical team in detail MICU rejected the case for IV insulin we will ask a second opinion from the full-time attending if they have any input to control her hyperglycemia better and possible IV insulin while she is in the hospital .  Endocrine fellow was paged    also call the office at [phone] left message  DVT PPX:    ADVANCED DIRECTIVE:    DISPOSITION:

## 2019-08-24 LAB
ANION GAP SERPL CALC-SCNC: 19 MMOL/L — HIGH (ref 5–17)
BUN SERPL-MCNC: 93 MG/DL — HIGH (ref 7–23)
CALCIUM SERPL-MCNC: 9.5 MG/DL — SIGNIFICANT CHANGE UP (ref 8.4–10.5)
CHLORIDE SERPL-SCNC: 98 MMOL/L — SIGNIFICANT CHANGE UP (ref 96–108)
CO2 SERPL-SCNC: 19 MMOL/L — LOW (ref 22–31)
CREAT SERPL-MCNC: 1.94 MG/DL — HIGH (ref 0.5–1.3)
GLUCOSE BLDC GLUCOMTR-MCNC: 239 MG/DL — HIGH (ref 70–99)
GLUCOSE BLDC GLUCOMTR-MCNC: 273 MG/DL — HIGH (ref 70–99)
GLUCOSE BLDC GLUCOMTR-MCNC: 315 MG/DL — HIGH (ref 70–99)
GLUCOSE BLDC GLUCOMTR-MCNC: 379 MG/DL — HIGH (ref 70–99)
GLUCOSE SERPL-MCNC: 140 MG/DL — HIGH (ref 70–99)
HCT VFR BLD CALC: 29 % — LOW (ref 34.5–45)
HGB BLD-MCNC: 9.7 G/DL — LOW (ref 11.5–15.5)
MCHC RBC-ENTMCNC: 28.6 PG — SIGNIFICANT CHANGE UP (ref 27–34)
MCHC RBC-ENTMCNC: 33.4 GM/DL — SIGNIFICANT CHANGE UP (ref 32–36)
MCV RBC AUTO: 85.7 FL — SIGNIFICANT CHANGE UP (ref 80–100)
PLATELET # BLD AUTO: 429 K/UL — HIGH (ref 150–400)
POTASSIUM SERPL-MCNC: 3.9 MMOL/L — SIGNIFICANT CHANGE UP (ref 3.5–5.3)
POTASSIUM SERPL-SCNC: 3.9 MMOL/L — SIGNIFICANT CHANGE UP (ref 3.5–5.3)
RBC # BLD: 3.38 M/UL — LOW (ref 3.8–5.2)
RBC # FLD: 15.9 % — HIGH (ref 10.3–14.5)
SODIUM SERPL-SCNC: 136 MMOL/L — SIGNIFICANT CHANGE UP (ref 135–145)
WBC # BLD: 10.3 K/UL — SIGNIFICANT CHANGE UP (ref 3.8–10.5)
WBC # FLD AUTO: 10.3 K/UL — SIGNIFICANT CHANGE UP (ref 3.8–10.5)

## 2019-08-24 PROCEDURE — 99223 1ST HOSP IP/OBS HIGH 75: CPT | Mod: GC

## 2019-08-24 RX ORDER — INSULIN NPH HUM/REG INSULIN HM 70-30/ML
85 VIAL (ML) SUBCUTANEOUS
Refills: 0 | Status: DISCONTINUED | OUTPATIENT
Start: 2019-08-24 | End: 2019-08-29

## 2019-08-24 RX ORDER — INSULIN LISPRO 100/ML
40 VIAL (ML) SUBCUTANEOUS
Refills: 0 | Status: DISCONTINUED | OUTPATIENT
Start: 2019-08-24 | End: 2019-08-25

## 2019-08-24 RX ORDER — INSULIN NPH HUM/REG INSULIN HM 70-30/ML
75 VIAL (ML) SUBCUTANEOUS
Refills: 0 | Status: DISCONTINUED | OUTPATIENT
Start: 2019-08-24 | End: 2019-08-29

## 2019-08-24 RX ADMIN — Medication 2: at 08:12

## 2019-08-24 RX ADMIN — Medication 76 UNIT(S): at 08:31

## 2019-08-24 RX ADMIN — LIDOCAINE 1 PATCH: 4 CREAM TOPICAL at 21:08

## 2019-08-24 RX ADMIN — SENNA PLUS 2 TABLET(S): 8.6 TABLET ORAL at 21:10

## 2019-08-24 RX ADMIN — SPIRONOLACTONE 25 MILLIGRAM(S): 25 TABLET, FILM COATED ORAL at 05:58

## 2019-08-24 RX ADMIN — LIDOCAINE 1 PATCH: 4 CREAM TOPICAL at 09:34

## 2019-08-24 RX ADMIN — HEPARIN SODIUM 5000 UNIT(S): 5000 INJECTION INTRAVENOUS; SUBCUTANEOUS at 17:33

## 2019-08-24 RX ADMIN — Medication 35 UNIT(S): at 16:56

## 2019-08-24 RX ADMIN — PANTOPRAZOLE SODIUM 40 MILLIGRAM(S): 20 TABLET, DELAYED RELEASE ORAL at 05:59

## 2019-08-24 RX ADMIN — Medication 3 MILLIGRAM(S): at 21:09

## 2019-08-24 RX ADMIN — Medication 650 MILLIGRAM(S): at 11:37

## 2019-08-24 RX ADMIN — BUMETANIDE 3 MILLIGRAM(S): 0.25 INJECTION INTRAMUSCULAR; INTRAVENOUS at 17:32

## 2019-08-24 RX ADMIN — Medication 68 UNIT(S): at 16:58

## 2019-08-24 RX ADMIN — Medication 25 MILLIGRAM(S): at 05:58

## 2019-08-24 RX ADMIN — ATORVASTATIN CALCIUM 40 MILLIGRAM(S): 80 TABLET, FILM COATED ORAL at 21:10

## 2019-08-24 RX ADMIN — ISOSORBIDE DINITRATE 30 MILLIGRAM(S): 5 TABLET ORAL at 05:58

## 2019-08-24 RX ADMIN — Medication 1 TABLET(S): at 11:34

## 2019-08-24 RX ADMIN — Medication 81 MILLIGRAM(S): at 11:34

## 2019-08-24 RX ADMIN — Medication 4: at 16:56

## 2019-08-24 RX ADMIN — HEPARIN SODIUM 5000 UNIT(S): 5000 INJECTION INTRAVENOUS; SUBCUTANEOUS at 05:59

## 2019-08-24 RX ADMIN — Medication 50 MICROGRAM(S): at 05:58

## 2019-08-24 RX ADMIN — Medication 0.6 MILLIGRAM(S): at 11:34

## 2019-08-24 RX ADMIN — Medication 50 MILLIGRAM(S): at 05:58

## 2019-08-24 RX ADMIN — Medication 0.25 MILLIGRAM(S): at 01:05

## 2019-08-24 RX ADMIN — Medication 650 MILLIGRAM(S): at 12:30

## 2019-08-24 RX ADMIN — BUMETANIDE 3 MILLIGRAM(S): 0.25 INJECTION INTRAMUSCULAR; INTRAVENOUS at 05:58

## 2019-08-24 RX ADMIN — MONTELUKAST 10 MILLIGRAM(S): 4 TABLET, CHEWABLE ORAL at 11:34

## 2019-08-24 RX ADMIN — Medication 35 UNIT(S): at 12:10

## 2019-08-24 RX ADMIN — Medication 35 UNIT(S): at 08:12

## 2019-08-24 RX ADMIN — CLOPIDOGREL BISULFATE 75 MILLIGRAM(S): 75 TABLET, FILM COATED ORAL at 11:34

## 2019-08-24 RX ADMIN — Medication 50 MILLIGRAM(S): at 21:09

## 2019-08-24 RX ADMIN — Medication 5: at 12:10

## 2019-08-24 RX ADMIN — Medication 100 MILLIGRAM(S): at 17:32

## 2019-08-24 RX ADMIN — Medication 50 MILLIGRAM(S): at 14:00

## 2019-08-24 RX ADMIN — LIDOCAINE 1 PATCH: 4 CREAM TOPICAL at 07:34

## 2019-08-24 RX ADMIN — ISOSORBIDE DINITRATE 30 MILLIGRAM(S): 5 TABLET ORAL at 21:09

## 2019-08-24 RX ADMIN — ISOSORBIDE DINITRATE 30 MILLIGRAM(S): 5 TABLET ORAL at 14:00

## 2019-08-24 RX ADMIN — Medication 1: at 21:19

## 2019-08-24 RX ADMIN — POLYETHYLENE GLYCOL 3350 17 GRAM(S): 17 POWDER, FOR SOLUTION ORAL at 11:36

## 2019-08-24 NOTE — PROGRESS NOTE ADULT - ASSESSMENT
Assessment  DMT2: 73y Female with DM T2 with hyperglycemia on insulin, dose was increased yesterday, sugars still running high, she is eating partial meals.  CAD: S/P cabg On medications, stable, monitored.  Gout: Rheum consult done, on colchiceine still has ankle pain.  Hypothyroidism: synthroid 50 mcg po daily, asymptomatic.  HTN: Controlled, On med.  HLD; on statin  CKD: Monitor labs/BMP        Plan:   DMT2:  Will increase Humalog to 40u TID before each lovely, increase mixed insulin to 85u am, 75u pm, If sugars remain high may consider U500 insulin, will FU.  Will continue monitoring FS, log, and follow up. Spoke with patient and primary team.  CAD: S/P cabg On medications, stable, monitored.  Hypothyroidism: Continue synthroid 50 mcg po daily, asymptomatic. F/U with TFTs as outpatient  HTN: Continue treatment, monitoring, Primary team FU  HLD; on statin  CKD: Continue monitoring labs, renal FU

## 2019-08-24 NOTE — CONSULT NOTE ADULT - PROVIDER SPECIALTY LIST ADULT
Cardiology
Electrophysiology
Endocrinology
Endocrinology
Heart Failure
Infectious Disease
MICU
Nephrology
Pain Medicine
Palliative Care
Rheumatology
CT Surgery
Endocrinology

## 2019-08-24 NOTE — CONSULT NOTE ADULT - CONSULT REASON
Diabetes management
GOC
HF management
Neck and left shoulder/ arm pain
Renal failure
SOB
SVT episode
Uncontrolled diabetes
ankle pain
hyperglycemia
r/o ESBL UTI
SOB
Unconteroled diabetes

## 2019-08-24 NOTE — CONSULT NOTE ADULT - REASON FOR ADMISSION
Fluid overload
Hypoxia, SOB

## 2019-08-24 NOTE — PROGRESS NOTE ADULT - SUBJECTIVE AND OBJECTIVE BOX
Chief complaint  Patient is a 73y old  Female who presents with a chief complaint of Hypoxia, SOB (24 Aug 2019 18:19)   Review of systems  Patient in bed, looks comfortable, no fever,  no hypoglycemia.    Labs and Fingersticks  CAPILLARY BLOOD GLUCOSE      POCT Blood Glucose.: 315 mg/dL (24 Aug 2019 16:41)  POCT Blood Glucose.: 379 mg/dL (24 Aug 2019 12:07)  POCT Blood Glucose.: 239 mg/dL (24 Aug 2019 07:54)  POCT Blood Glucose.: 293 mg/dL (23 Aug 2019 21:29)      Anion Gap, Serum: 19 <H> (08-24 @ 06:35)  Anion Gap, Serum: 17 (08-23 @ 06:47)      Calcium, Total Serum: 9.5 (08-24 @ 06:35)  Calcium, Total Serum: 9.7 (08-23 @ 06:47)  Albumin, Serum: 4.1 (08-23 @ 06:47)    Alanine Aminotransferase (ALT/SGPT): 23 (08-23 @ 06:47)  Alkaline Phosphatase, Serum: 93 (08-23 @ 06:47)  Aspartate Aminotransferase (AST/SGOT): 23 (08-23 @ 06:47)        08-24    136  |  98  |  93<H>  ----------------------------<  140<H>  3.9   |  19<L>  |  1.94<H>    Ca    9.5      24 Aug 2019 06:35    TPro  8.2  /  Alb  4.1  /  TBili  0.2  /  DBili  x   /  AST  23  /  ALT  23  /  AlkPhos  93  08-23                        9.7    10.3  )-----------( 429      ( 24 Aug 2019 06:35 )             29.0     Medications  MEDICATIONS  (STANDING):  acetaminophen  IVPB .. 1000 milliGRAM(s) IV Intermittent once  aspirin enteric coated 81 milliGRAM(s) Oral daily  atorvastatin 40 milliGRAM(s) Oral at bedtime  buMETAnide 3 milliGRAM(s) Oral every 12 hours  chlorhexidine 2% Cloths 1 Application(s) Topical daily  clopidogrel Tablet 75 milliGRAM(s) Oral daily  colchicine 0.6 milliGRAM(s) Oral daily  dextrose 5%. 1000 milliLiter(s) (50 mL/Hr) IV Continuous <Continuous>  dextrose 50% Injectable 12.5 Gram(s) IV Push once  dextrose 50% Injectable 25 Gram(s) IV Push once  dextrose 50% Injectable 25 Gram(s) IV Push once  docusate sodium 100 milliGRAM(s) Oral two times a day  heparin  Injectable 5000 Unit(s) SubCutaneous every 12 hours  hydrALAZINE 50 milliGRAM(s) Oral every 8 hours  insulin lispro (HumaLOG) corrective regimen sliding scale   SubCutaneous three times a day before meals  insulin lispro (HumaLOG) corrective regimen sliding scale   SubCutaneous at bedtime  insulin lispro Injectable (HumaLOG) 35 Unit(s) SubCutaneous three times a day before meals  insulin lispro Injectable (HumaLOG) 40 Unit(s) SubCutaneous three times a day before meals  insulin NPH/regular 70/30 Injectable 85 Unit(s) SubCutaneous before breakfast  insulin NPH/regular 70/30 Injectable 75 Unit(s) SubCutaneous before dinner  isosorbide   dinitrate Tablet (ISORDIL) 30 milliGRAM(s) Oral three times a day  lactated ringers. 1000 milliLiter(s) (75 mL/Hr) IV Continuous <Continuous>  levothyroxine 50 MICROGram(s) Oral daily  lidocaine   Patch 1 Patch Transdermal daily  melatonin 3 milliGRAM(s) Oral at bedtime  metoprolol succinate ER 25 milliGRAM(s) Oral daily  montelukast 10 milliGRAM(s) Oral daily  Nephro-enrico 1 Tablet(s) Oral daily  pantoprazole    Tablet 40 milliGRAM(s) Oral before breakfast  polyethylene glycol 3350 17 Gram(s) Oral daily  senna 2 Tablet(s) Oral at bedtime  spironolactone 25 milliGRAM(s) Oral daily      Physical Exam  General: Patient comfortable in bed  Vital Signs Last 12 Hrs  T(F): 98 (08-24-19 @ 11:36), Max: 98 (08-24-19 @ 11:36)  HR: 90 (08-24-19 @ 17:29) (81 - 90)  BP: 111/63 (08-24-19 @ 17:29) (108/65 - 116/53)  BP(mean): --  RR: 18 (08-24-19 @ 11:36) (18 - 18)  SpO2: 97% (08-24-19 @ 11:36) (97% - 97%)  Neck: No palpable thyroid nodules.  CVS: S1S2, No murmurs  Respiratory: No wheezing, no crepitations  GI: Abdomen soft, bowel sounds positive  Musculoskeletal:  edema lower extremities.   Skin: No skin rashes, no ecchymosis    Diagnostics    Cortisol AM, Serum: AM Sched. Collection: 18-Aug-2019 06:00 (08-17 @ 12:14)  Cortisol AM, Serum: AM Sched. Collection: 19-Aug-2019 06:00 (08-18 @ 12:59)

## 2019-08-24 NOTE — CONSULT NOTE ADULT - PROBLEM SELECTOR PROBLEM 2
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
Hypothyroidism, unspecified type
Severe mitral regurgitation
MERVIN (acute kidney injury)

## 2019-08-24 NOTE — CONSULT NOTE ADULT - CONSULT REQUESTED DATE/TIME
05-Jul-2019 14:37
05-Jul-2019 22:49
09-Aug-2019 15:32
09-Jul-2019 19:50
10-Jul-2019 14:00
15-Jul-2019 12:00
17-Jul-2019 11:48
22-Aug-2019 10:40
22-Aug-2019 13:37
24-Aug-2019 12:34
25-Jul-2019 11:56
05-Jul-2019
26-Jul-2019 11:02

## 2019-08-24 NOTE — CHART NOTE - NSCHARTNOTEFT_GEN_A_CORE
MEDICINE NP NOTE  Spectra [id number]    Recommendations from 2nd endocrine opinion discussed with Dr Salazar, will hold off on changing insulin regimen at this time.  Continue to monitor at this time.

## 2019-08-24 NOTE — CONSULT NOTE ADULT - PROBLEM SELECTOR RECOMMENDATION 2
S/P cabg On medications, stable, monitored
- s/p Mitral clip   - TTE on 7/9: mild MR
TSH 2.0, TT4 6.78 and TT3 63 in acute setting. TT3 could be low 2/2 sick euthyroid.   -c/w levothyroxine 50mcg   -recommend repeating tsh, ft4 and TT3 in next set of labs
Judicious dosing of agents

## 2019-08-24 NOTE — PROGRESS NOTE ADULT - SUBJECTIVE AND OBJECTIVE BOX
CHIEF COMPLAINT:    SUBJECTIVE:     REVIEW OF SYSTEMS:    CONSTITUTIONAL: (  )  weakness,  (  ) fevers or chills  EYES/ENT: (  )visual changes;     NECK: (  ) pain or stiffness  RESPIRATORY:   (  )cough, wheezing, hemoptysis;  (  ) shortness of breath  CARDIOVASCULAR:  (  )chest pain or palpitations  GASTROINTESTINAL:   (  )abdominal or epigastric pain.  (  ) nausea, vomiting, or hematemesis;   (   ) diarrhea or constipation.   GENITOURINARY:   (    ) dysuria, frequency or hematuria  NEUROLOGICAL:  (   ) numbness or weakness   All other review of systems is negative unless indicated above    Vital Signs Last 24 Hrs  T(C): 36.7 (24 Aug 2019 11:36), Max: 36.9 (24 Aug 2019 04:20)  T(F): 98 (24 Aug 2019 11:36), Max: 98.4 (24 Aug 2019 04:20)  HR: 85 (24 Aug 2019 11:36) (78 - 92)  BP: 116/53 (24 Aug 2019 11:36) (104/56 - 116/53)  BP(mean): --  RR: 18 (24 Aug 2019 11:36) (18 - 18)  SpO2: 97% (24 Aug 2019 11:36) (97% - 100%)    I&O's Summary    23 Aug 2019 07:01  -  24 Aug 2019 07:00  --------------------------------------------------------  IN: 895 mL / OUT: 1650 mL / NET: -755 mL    24 Aug 2019 07:01  -  24 Aug 2019 13:23  --------------------------------------------------------  IN: 460 mL / OUT: 700 mL / NET: -240 mL        CAPILLARY BLOOD GLUCOSE      POCT Blood Glucose.: 379 mg/dL (24 Aug 2019 12:07)  POCT Blood Glucose.: 239 mg/dL (24 Aug 2019 07:54)  POCT Blood Glucose.: 293 mg/dL (23 Aug 2019 21:29)  POCT Blood Glucose.: 357 mg/dL (23 Aug 2019 17:00)      PHYSICAL EXAM:    Constitutional:  (   ) NAD,   (   )awake and alert  HEENT: PERR, EOMI,    Neck: Soft and supple, No LAD, No JVD  Respiratory:  (    Breath sounds are clear bilaterally,    (   ) wheezing, rales or rhonchi  Cardiovascular:     (   )S1 and S2, regular rate and rhythm, no Murmurs, gallops or rubs  Gastrointestinal:  (   )Bowel Sounds present, soft,   (  )nontender, nondistended,    Extremities:    (  ) peripheral edema  Vascular: 2+ peripheral pulses  Neurological:    (    )A/O x 3,   (  ) focal deficits  Musculoskeletal:    (   )  normal strength b/l upper  (     ) normal  lower extremities  Skin: No rashes    MEDICATIONS:  MEDICATIONS  (STANDING):  acetaminophen  IVPB .. 1000 milliGRAM(s) IV Intermittent once  aspirin enteric coated 81 milliGRAM(s) Oral daily  atorvastatin 40 milliGRAM(s) Oral at bedtime  buMETAnide 3 milliGRAM(s) Oral every 12 hours  chlorhexidine 2% Cloths 1 Application(s) Topical daily  clopidogrel Tablet 75 milliGRAM(s) Oral daily  colchicine 0.6 milliGRAM(s) Oral daily  dextrose 5%. 1000 milliLiter(s) (50 mL/Hr) IV Continuous <Continuous>  dextrose 50% Injectable 12.5 Gram(s) IV Push once  dextrose 50% Injectable 25 Gram(s) IV Push once  dextrose 50% Injectable 25 Gram(s) IV Push once  docusate sodium 100 milliGRAM(s) Oral two times a day  heparin  Injectable 5000 Unit(s) SubCutaneous every 12 hours  hydrALAZINE 50 milliGRAM(s) Oral every 8 hours  insulin lispro (HumaLOG) corrective regimen sliding scale   SubCutaneous three times a day before meals  insulin lispro (HumaLOG) corrective regimen sliding scale   SubCutaneous at bedtime  insulin lispro Injectable (HumaLOG) 35 Unit(s) SubCutaneous three times a day before meals  insulin NPH/regular 70/30 Injectable 76 Unit(s) SubCutaneous before breakfast  insulin NPH/regular 70/30 Injectable 68 Unit(s) SubCutaneous before dinner  isosorbide   dinitrate Tablet (ISORDIL) 30 milliGRAM(s) Oral three times a day  lactated ringers. 1000 milliLiter(s) (75 mL/Hr) IV Continuous <Continuous>  levothyroxine 50 MICROGram(s) Oral daily  lidocaine   Patch 1 Patch Transdermal daily  melatonin 3 milliGRAM(s) Oral at bedtime  metoprolol succinate ER 25 milliGRAM(s) Oral daily  montelukast 10 milliGRAM(s) Oral daily  Nephro-enrico 1 Tablet(s) Oral daily  pantoprazole    Tablet 40 milliGRAM(s) Oral before breakfast  polyethylene glycol 3350 17 Gram(s) Oral daily  senna 2 Tablet(s) Oral at bedtime  spironolactone 25 milliGRAM(s) Oral daily      LABS: All Labs Reviewed:                        9.7    10.3  )-----------( 429      ( 24 Aug 2019 06:35 )             29.0     08-24    136  |  98  |  93<H>  ----------------------------<  140<H>  3.9   |  19<L>  |  1.94<H>    Ca    9.5      24 Aug 2019 06:35    TPro  8.2  /  Alb  4.1  /  TBili  0.2  /  DBili  x   /  AST  23  /  ALT  23  /  AlkPhos  93  08-23          Blood Culture: 08-22 @ 14:50  Organism --  Gram Stain Blood -- Gram Stain --  Specimen Source .Blood  Culture-Blood --      Urine Culture      RADIOLOGY/EKG:    ASSESSMENT AND PLAN:    DVT PPX:    ADVANCED DIRECTIVE:    DISPOSITION: CHIEF COMPLAINT: no event overnight  ·	patient seen and examined in the bed awake and verbal without SOB  but complained of weakness.  ·	still with elevated blood sugar but it less than 400 now but only partial improvement discussed with the Endo team in detail  SUBJECTIVE:     REVIEW OF SYSTEMS:    CONSTITUTIONAL: ( x )  weakness,  (  ) fevers or chills  EYES/ENT: (  )visual changes;     NECK: (  ) pain or stiffness  RESPIRATORY:   (  )cough, wheezing, hemoptysis;  (  ) shortness of breath  CARDIOVASCULAR:  (  )chest pain or palpitations  GASTROINTESTINAL:   (  )abdominal or epigastric pain.  (  ) nausea, vomiting, or hematemesis;   (   ) diarrhea or constipation.   GENITOURINARY:   (    ) dysuria, frequency or hematuria  NEUROLOGICAL:  (   ) numbness or weakness   All other review of systems is negative unless indicated above    Vital Signs Last 24 Hrs  T(C): 36.7 (24 Aug 2019 11:36), Max: 36.9 (24 Aug 2019 04:20)  T(F): 98 (24 Aug 2019 11:36), Max: 98.4 (24 Aug 2019 04:20)  HR: 85 (24 Aug 2019 11:36) (78 - 92)  BP: 116/53 (24 Aug 2019 11:36) (104/56 - 116/53)  BP(mean): --  RR: 18 (24 Aug 2019 11:36) (18 - 18)  SpO2: 97% (24 Aug 2019 11:36) (97% - 100%)    I&O's Summary    23 Aug 2019 07:01  -  24 Aug 2019 07:00  --------------------------------------------------------  IN: 895 mL / OUT: 1650 mL / NET: -755 mL    24 Aug 2019 07:01  -  24 Aug 2019 13:23  --------------------------------------------------------  IN: 460 mL / OUT: 700 mL / NET: -240 mL        CAPILLARY BLOOD GLUCOSE      POCT Blood Glucose.: 379 mg/dL (24 Aug 2019 12:07)  POCT Blood Glucose.: 239 mg/dL (24 Aug 2019 07:54)  POCT Blood Glucose.: 293 mg/dL (23 Aug 2019 21:29)  POCT Blood Glucose.: 357 mg/dL (23 Aug 2019 17:00)    ==============  POCT Blood Glucose.: 249 mg/dL (23 Aug 2019 06:27)  POCT Blood Glucose.: 372 mg/dL (23 Aug 2019 00:09)  POCT Blood Glucose.: 396 mg/dL (22 Aug 2019 23:08)                      POCT Blood Glucose.: 319 mg/dL (22 Aug 2019 07:41)  POCT Blood Glucose.: 383 mg/dL (21 Aug 2019 21:34)  POCT Blood Glucose.: 450 mg/dL (21 Aug 2019 17:47)  POCT Blood Glucose.: 437 mg/dL (21 Aug 2019 17:46)  POCT Blood Glucose.: 485 mg/dL (21 Aug 2019 16:29)  POCT Blood Glucose.: 435 mg/dL (21 Aug 2019 16:29)  POCT Blood Glucose.: 493 mg/dL (21 Aug 2019 14:22)  POCT Blood Glucose.: 469 mg/dL (21 Aug 2019 14:22)  POCT Blood Glucose.: 429 mg/dL (21 Aug 2019 11:38)  POCT Blood Glucose.: 428 mg/dL (21 Aug 2019 11:37)    POCT Blood Glucose.: 391 mg/dL (22 Aug 2019 22:10)  POCT Blood Glucose.: 371 mg/dL (22 Aug 2019 21:06)  POCT Blood Glucose.: 383 mg/dL (22 Aug 2019 20:59)  POCT Blood Glucose.: 385 mg/dL (22 Aug 2019 20:06)  POCT Blood Glucose.: 359 mg/dL (22 Aug 2019 17:16)  POCT Blood Glucose.: 233 mg/dL (22 Aug 2019 13:02)  POCT Blood Glucose.: 353 mg/dL (22 Aug 2019 11:36)  POCT Blood Glucose.: 393 mg/dL (22 Aug 2019 10:25)        PHYSICAL EXAM:    Constitutional:  ( x  ) NAD,   (   )awake and alert  HEENT: PERR, EOMI,    Neck: Soft and supple, No LAD, No JVD  Respiratory:  (  x  Breath sounds are clear bilaterally,    (   ) wheezing, rales or rhonchi  Cardiovascular:     (  x )S1 and S2, regular rate and rhythm, no Murmurs, gallops or rubs  Gastrointestinal:  ( x  )Bowel Sounds present, soft,   (  )nontender, nondistended,    Extremities:    (  ) peripheral edema  Vascular: 2+ peripheral pulses  Neurological:    ( x   )A/O x 3,   (  ) focal deficits  Musculoskeletal:    (   )  normal strength b/l upper  (     ) normal  lower extremities  Skin: No rashes    MEDICATIONS:  MEDICATIONS  (STANDING):  acetaminophen  IVPB .. 1000 milliGRAM(s) IV Intermittent once  aspirin enteric coated 81 milliGRAM(s) Oral daily  atorvastatin 40 milliGRAM(s) Oral at bedtime  buMETAnide 3 milliGRAM(s) Oral every 12 hours  chlorhexidine 2% Cloths 1 Application(s) Topical daily  clopidogrel Tablet 75 milliGRAM(s) Oral daily  colchicine 0.6 milliGRAM(s) Oral daily  dextrose 5%. 1000 milliLiter(s) (50 mL/Hr) IV Continuous <Continuous>  dextrose 50% Injectable 12.5 Gram(s) IV Push once  dextrose 50% Injectable 25 Gram(s) IV Push once  dextrose 50% Injectable 25 Gram(s) IV Push once  docusate sodium 100 milliGRAM(s) Oral two times a day  heparin  Injectable 5000 Unit(s) SubCutaneous every 12 hours  hydrALAZINE 50 milliGRAM(s) Oral every 8 hours  insulin lispro (HumaLOG) corrective regimen sliding scale   SubCutaneous three times a day before meals  insulin lispro (HumaLOG) corrective regimen sliding scale   SubCutaneous at bedtime  insulin lispro Injectable (HumaLOG) 35 Unit(s) SubCutaneous three times a day before meals  insulin NPH/regular 70/30 Injectable 76 Unit(s) SubCutaneous before breakfast  insulin NPH/regular 70/30 Injectable 68 Unit(s) SubCutaneous before dinner  isosorbide   dinitrate Tablet (ISORDIL) 30 milliGRAM(s) Oral three times a day  lactated ringers. 1000 milliLiter(s) (75 mL/Hr) IV Continuous <Continuous>  levothyroxine 50 MICROGram(s) Oral daily  lidocaine   Patch 1 Patch Transdermal daily  melatonin 3 milliGRAM(s) Oral at bedtime  metoprolol succinate ER 25 milliGRAM(s) Oral daily  montelukast 10 milliGRAM(s) Oral daily  Nephro-enrico 1 Tablet(s) Oral daily  pantoprazole    Tablet 40 milliGRAM(s) Oral before breakfast  polyethylene glycol 3350 17 Gram(s) Oral daily  senna 2 Tablet(s) Oral at bedtime  spironolactone 25 milliGRAM(s) Oral daily      LABS: All Labs Reviewed:                        9.7    10.3  )-----------( 429      ( 24 Aug 2019 06:35 )             29.0     08-24    136  |  98  |  93<H>  ----------------------------<  140<H>  3.9   |  19<L>  |  1.94<H>    Ca    9.5      24 Aug 2019 06:35    TPro  8.2  /  Alb  4.1  /  TBili  0.2  /  DBili  x   /  AST  23  /  ALT  23  /  AlkPhos  93  08-23          Blood Culture: 08-22 @ 14:50  Organism --  Gram Stain Blood -- Gram Stain --  Specimen Source .Blood  Culture-Blood --      Urine Culture      RADIOLOGY/EKG:    ASSESSMENT AND PLAN:  74 yo woman with PMHx of HTN, T2DM (A1c 7.4%), CAD s/p CABG (LIMA to LAD, SVG to OM, SVG to PDA 2014 at [facility]), non-dilated ICM (EF 20-25%), severe mitral regurgitation s/p mitral clip (6/13/19 Dr. Newman), severe pulm HTN, CKD, hypothyroidism, who is presenting to ED after [facility] worsening [facility] and [facility] Of note, pt was recently discharged on 6/19 after having mitral clip procedure completed. She initially required BiPAP with improvement in her respiratory status following diuresis. Initiated on vasopressors and inotropes in CCU, which were subsequently weaned off. Infectious work up was negative.    #HFrEF likely 2/2 ICM with MR s/p alonso clip  -  continue with Bumex 3 mg twice daily        - continue hydralazine 50 mg TID and isordil 30 TID  - continue spironolactone 25 mg  - continue Metoprolol Succinate 25 mg PO Daily   - No further cardiac testing at this time.      #CAD  - continue ASA and statin   - Pt with chest pain, but also with end stage cardiomyopathy, coronary disease, had MitraClip and no further intervention feasible. She should not have trops checked.     #SVT - likely Atrial Tach vs. Atrial Flutter   - Amio initially not given due to elevated LFTs likely in the setting of congestion.   - recheck LFTs.   - Monitor on tele.   - continue metoprolol at current dose as above  - If pt has SVT again can consider amio   #diabetes     on   NPH 70/30  76 units before breakfast and 68 units before dinner and 35 regular insulin 3 times a day with coverage discussed with the medical team in detail MICU rejected the case for IV insulin   second opinion from    house endocrinologist appreciated will not  change to the recommendation at present time While she was on Lantus still her blood sugar was high.   continue above management will wait for Dr. Banks for more adjustment discussed with the team. Also as per nursing they are not giving her insulin in  in upper arm they are using her abdominal area and upper thigh   e  DVT PPX:    ADVANCED DIRECTIVE:    DISPOSITION:

## 2019-08-24 NOTE — CONSULT NOTE ADULT - ASSESSMENT
74 yo woman with PMHx of HTN, T2DM, CAD s/p CABG (LIMA to LAD, SVG to OM, SVG to PDA 2014 at [facility]), non-dilated ICM (EF 20-25%), severe mitral regurgitation s/p mitral clip (6/13/19 Dr. Newman), severe pulm HTN, CKD, hypothyroidism admitted for sob likely 2/2 cardiogenic shock now off pressors.   Consulted for worsening hyperglycemia despite high doses of 70/30 and humalog insulin. Dr. Banks following but consulted for 2nd opinion. 72 yo woman with PMHx of HTN, T2DM, CAD s/p CABG (LIMA to LAD, SVG to OM, SVG to PDA 2014 at [facility]), non-dilated ICM (EF 20-25%), severe mitral regurgitation s/p mitral clip (6/13/19 Dr. Newman), severe pulm HTN, CKD, hypothyroidism admitted for sob likely 2/2 cardiogenic shock now off pressors.   Consulted for worsening hyperglycemia despite high doses of 70/30 and humalog insulin. Dr. Banks following but consulted for 2nd opinion by primary team  reccs given yesterday and earlier today, not implemented as of yet.

## 2019-08-24 NOTE — CONSULT NOTE ADULT - SUBJECTIVE AND OBJECTIVE BOX
HPI:  Pt is a 74 yo woman with PMHx of HTN, T2DM, CAD s/p CABG (LIMA to LAD, SVG to OM, SVG to PDA 2014 at [facility]), non-dilated ICM (EF 20-25%), severe mitral regurgitation s/p mitral clip (6/13/19 Dr. Newman), severe pulm HTN, CKD, hypothyroidism, who is presenting [facility] ED [facility] Also complaining of intermittent chest pain. Of note, pt was recently discharged on 6/19 after having mitral clip procedure completed. Pt says that she has been experiencing SOB for about 1 week. However, she was never noted to be hypoxic in rehab until today. She was not able to provide much hx 2/2 SOB and being on bipap. Daughter at bedside reporting that pt was well immediately after discharge. However, she gradually developed worsening SOB. Better with rest initially. Nothing alleviating SOB now. It is constant throughout the day, made worse with exertion. No fevers, chills, nausea, vomiting, cough, sputum production, chest tightness, pressure, palpitations, LOC, syncope.    In the ED, pt afebrile, , /75, RR 22, O2 100% on 2L NC. She subsequently developed worsened work of breathing and was placed on bipap with improvement. She was given , lasix 40mg IVP x1. CXR significant for pulmonary edema. Anemia to 9.3/28.5. BUN/Cr 42/1.72 (54/2.54 on 6/19). BNP 6626. VBG 7.49/29/61/22. TTE pending. (05 Jul 2019 19:02)    Endo:  # [id number]   T2DM for 15 years with hgb  a1c of 7.4 % on 7/2019 on home insulin of Lantus 50 units and humalog 24 TIDAC. Follows endocrinologist outside but does not remember name. Complications of CKD4, denies neuropathy or retinopathy. Patient's glucoses have been hard to control on current regimen on high doses of 70/30 and humalog premeal insulin. She has outside food brought from her daughter at night but only 1 banana, a few spoons of rice and soup according to her daughter.     Hypothyroidism with unknown etiology and daughter does not know either aside from that she has had it for 10 years. No FH of thyroid disorders according to daughter. Sxs of fatigue, no temperature intolerance or GI sxs.     PAST MEDICAL & SURGICAL HISTORY:  GERD (gastroesophageal reflux disease)  CAD (coronary artery disease): s/p CABG  Hypothyroidism  Hyperlipidemia  Diabetes mellitus, type 2  S/P CABG (coronary artery bypass graft)      FAMILY HISTORY:  Family history of hypertension (Sibling): sister  Family history of diabetes mellitus (Sibling): brothers/sisters      Social History: No tobacco or etoh abuse.     Outpatient Medications: Home Medications:  acetaminophen 325 mg oral tablet: 2 tab(s) orally every 6 hours, As needed, Temp greater or equal to 38C (100.4F) (05 Jul 2019 20:16)  aspirin 81 mg oral delayed release tablet: 1 tab(s) orally once a day (05 Jul 2019 20:16)  atorvastatin 40 mg oral tablet: 1 tab(s) orally once a day (at bedtime) (05 Jul 2019 20:16)  bumetanide 2 mg oral tablet: 1 tab(s) orally every 12 hours (05 Jul 2019 20:16)  docusate sodium 100 mg oral capsule: 1 cap(s) orally 2 times a day (05 Jul 2019 20:16)  heparin: 5000 unit(s) subcutaneous 2 times a day (05 Jul 2019 20:16)  HumaLOG 100 units/mL subcutaneous solution: 38 units before breakfast and lunch, and 46units before dinner (09 Aug 2019 15:08)  hydrALAZINE: 37.5 milligram(s) orally 3 times a day (05 Jul 2019 20:16)  isosorbide dinitrate 10 mg oral tablet: 1 tab(s) orally 3 times a day (05 Jul 2019 20:16)  Lantus 100 units/mL subcutaneous solution: 84 unit(s) subcutaneous once a day (at bedtime) (09 Aug 2019 15:08)  levothyroxine 50 mcg (0.05 mg) oral tablet: 1 tab(s) orally once a day (05 Jul 2019 20:16)  montelukast 10 mg oral tablet: 1 tab(s) orally once a day (05 Jul 2019 20:16)  multivitamin: 1 tab(s) orally once a day (09 Aug 2019 15:08)  pantoprazole 40 mg oral delayed release tablet: 1 tab(s) orally once a day (before a meal) (05 Jul 2019 20:16)  senna oral tablet: 2 tab(s) orally once a day (at bedtime) (05 Jul 2019 20:16)  ticagrelor 90 mg oral tablet: 1 tab(s) orally 2 times a day (05 Jul 2019 20:16)      MEDICATIONS  (STANDING):  acetaminophen  IVPB .. 1000 milliGRAM(s) IV Intermittent once  aspirin enteric coated 81 milliGRAM(s) Oral daily  atorvastatin 40 milliGRAM(s) Oral at bedtime  buMETAnide 3 milliGRAM(s) Oral every 12 hours  chlorhexidine 2% Cloths 1 Application(s) Topical daily  clopidogrel Tablet 75 milliGRAM(s) Oral daily  colchicine 0.6 milliGRAM(s) Oral daily  dextrose 5%. 1000 milliLiter(s) (50 mL/Hr) IV Continuous <Continuous>  dextrose 50% Injectable 12.5 Gram(s) IV Push once  dextrose 50% Injectable 25 Gram(s) IV Push once  dextrose 50% Injectable 25 Gram(s) IV Push once  docusate sodium 100 milliGRAM(s) Oral two times a day  heparin  Injectable 5000 Unit(s) SubCutaneous every 12 hours  hydrALAZINE 50 milliGRAM(s) Oral every 8 hours  insulin lispro (HumaLOG) corrective regimen sliding scale   SubCutaneous three times a day before meals  insulin lispro (HumaLOG) corrective regimen sliding scale   SubCutaneous at bedtime  insulin lispro Injectable (HumaLOG) 35 Unit(s) SubCutaneous three times a day before meals  insulin NPH/regular 70/30 Injectable 76 Unit(s) SubCutaneous before breakfast  insulin NPH/regular 70/30 Injectable 68 Unit(s) SubCutaneous before dinner  isosorbide   dinitrate Tablet (ISORDIL) 30 milliGRAM(s) Oral three times a day  lactated ringers. 1000 milliLiter(s) (75 mL/Hr) IV Continuous <Continuous>  levothyroxine 50 MICROGram(s) Oral daily  lidocaine   Patch 1 Patch Transdermal daily  melatonin 3 milliGRAM(s) Oral at bedtime  metoprolol succinate ER 25 milliGRAM(s) Oral daily  montelukast 10 milliGRAM(s) Oral daily  Nephro-enrico 1 Tablet(s) Oral daily  pantoprazole    Tablet 40 milliGRAM(s) Oral before breakfast  polyethylene glycol 3350 17 Gram(s) Oral daily  senna 2 Tablet(s) Oral at bedtime  spironolactone 25 milliGRAM(s) Oral daily    MEDICATIONS  (PRN):  acetaminophen   Tablet .. 650 milliGRAM(s) Oral every 6 hours PRN Mild Pain (1 - 3), Moderate Pain (4 - 6)  ALPRAZolam 0.25 milliGRAM(s) Oral three times a day PRN Anxiety  dextrose 40% Gel 15 Gram(s) Oral once PRN Blood Glucose LESS THAN 70 milliGRAM(s)/deciliter  glucagon  Injectable 1 milliGRAM(s) IntraMuscular once PRN Glucose LESS THAN 70 milligrams/deciliter  traMADol 25 milliGRAM(s) Oral every 6 hours PRN Moderate Pain (4 - 6)      Allergies    azithromycin (Hives; Pruritus)    Intolerances      Review of Systems:  Constitutional: No fever, chills   Neuro: No tremors, headaches   Cardiovascular: Intermittent chest pressure, No palpitations  Respiratory: SOB, no cough  GI: No nausea, vomiting, abdominal pain  : No dysuria, polyuria   Skin: no rash, ulcers   Psych: no depression, anxiety   Endocrine: no polyphagia, polydipsia     ALL OTHER SYSTEMS REVIEWED AND NEGATIVE        PHYSICAL EXAM:  VITALS: T(C): 36.7 (08-24-19 @ 11:36)  T(F): 98 (08-24-19 @ 11:36), Max: 98.4 (08-24-19 @ 04:20)  HR: 85 (08-24-19 @ 11:36) (78 - 92)  BP: 116/53 (08-24-19 @ 11:36) (104/56 - 116/53)  RR:  (18 - 18)  SpO2:  (97% - 100%)  Wt(kg): --  GENERAL: NAD, well-groomed, well-developed  EYES: No proptosis, anicteric  HEENT:  Atraumatic, Normocephalic, moist mucous membranes  RESPIRATORY: Clear to auscultation bilaterally; No rales, rhonchi, wheezing  CARDIOVASCULAR: Regular rate and rhythm; No murmurs; no peripheral edema  GI: Soft, nontender, non distended, normal bowel sounds  SKIN: Lipodystrophy on left thigh and right shouldrer. Dry, intact, No rashes or lesions  NEURO: AOx3, moves all extremities spontaneously   PSYCH: Reactive affect, euthymic mood    POCT Blood Glucose.: 379 mg/dL (08-24-19 @ 12:07)  POCT Blood Glucose.: 239 mg/dL (08-24-19 @ 07:54)  POCT Blood Glucose.: 293 mg/dL (08-23-19 @ 21:29)  POCT Blood Glucose.: 357 mg/dL (08-23-19 @ 17:00)  POCT Blood Glucose.: 310 mg/dL (08-23-19 @ 12:12)  POCT Blood Glucose.: 312 mg/dL (08-23-19 @ 07:54)  POCT Blood Glucose.: 249 mg/dL (08-23-19 @ 06:27)  POCT Blood Glucose.: 372 mg/dL (08-23-19 @ 00:09)  POCT Blood Glucose.: 396 mg/dL (08-22-19 @ 23:08)  POCT Blood Glucose.: 391 mg/dL (08-22-19 @ 22:10)  POCT Blood Glucose.: 371 mg/dL (08-22-19 @ 21:06)  POCT Blood Glucose.: 383 mg/dL (08-22-19 @ 20:59)  POCT Blood Glucose.: 385 mg/dL (08-22-19 @ 20:06)  POCT Blood Glucose.: 359 mg/dL (08-22-19 @ 17:16)  POCT Blood Glucose.: 233 mg/dL (08-22-19 @ 13:02)  POCT Blood Glucose.: 353 mg/dL (08-22-19 @ 11:36)  POCT Blood Glucose.: 393 mg/dL (08-22-19 @ 10:25)  POCT Blood Glucose.: 319 mg/dL (08-22-19 @ 07:41)  POCT Blood Glucose.: 383 mg/dL (08-21-19 @ 21:34)  POCT Blood Glucose.: 450 mg/dL (08-21-19 @ 17:47)  POCT Blood Glucose.: 437 mg/dL (08-21-19 @ 17:46)  POCT Blood Glucose.: 485 mg/dL (08-21-19 @ 16:29)  POCT Blood Glucose.: 435 mg/dL (08-21-19 @ 16:29)  POCT Blood Glucose.: 493 mg/dL (08-21-19 @ 14:22)  POCT Blood Glucose.: 469 mg/dL (08-21-19 @ 14:22)                            9.7    10.3  )-----------( 429      ( 24 Aug 2019 06:35 )             29.0       08-24    136  |  98  |  93<H>  ----------------------------<  140<H>  3.9   |  19<L>  |  1.94<H>    EGFR if : 29<L>  EGFR if non : 25<L>    Ca    9.5      08-24  Mg     2.3     08-22  Phos  3.9     08-22    TPro  8.2  /  Alb  4.1  /  TBili  0.2  /  DBili  x   /  AST  23  /  ALT  23  /  AlkPhos  93  08-23      Thyroid Function Tests:      Hemoglobin A1C, Whole Blood: 7.4 % <H> [4.0 - 5.6] (07-05-19 @ 22:32)  Hemoglobin A1C, Whole Blood: 7.8 % <H> [4.0 - 5.6] (06-01-19 @ 08:00)            Radiology:

## 2019-08-24 NOTE — CONSULT NOTE ADULT - CONSULT REQUESTED BY NAME
CCU team
Dr. Jeter
Dr. Salazar
Dr. Salazar
Dr. Yanes
ED
ED
Marie
Medical NP
Medicine
medicine
Any Quiñones)
Marie Lake MD

## 2019-08-24 NOTE — CONSULT NOTE ADULT - PROBLEM SELECTOR PROBLEM 4
Essential hypertension
Mixed hyperlipidemia
SVT (supraventricular tachycardia)
Acute on chronic systolic heart failure

## 2019-08-24 NOTE — CONSULT NOTE ADULT - ATTENDING COMMENTS
Kourtney Griffin MD  Pager [phone] ([facility])/ [phone] ([facility]) [please provide 10 digit call back number]  Nights and weekends: [phone]  Please note that this patient may be followed by a different provider tomorrow. If no answer or after hours, please contact [phone].  For final dc reccomendations, please call [phone] or page the endocrine fellow on call.

## 2019-08-24 NOTE — PROGRESS NOTE ADULT - SUBJECTIVE AND OBJECTIVE BOX
[facility] [place] [facility]   MD GONZALEZ SHAH D.O, PA    TELEPHONE NUMBERS:  OFFICE: [phone]  DR. SANTOYO CELL: [phone]  JUSTEN FIELD CELL: [phone]  DR. RIDER CELL:  [phone]    RENAL FOLLOW UP NOTE  --------------------------------------------------------------------------------  HPI: Pt seen and examined at bedside.   Keira SOB, chest pain     PAST HISTORY  --------------------------------------------------------------------------------  No significant changes to PMH, PSH, FHx, SHx, unless otherwise noted    ALLERGIES & MEDICATIONS  --------------------------------------------------------------------------------  Allergies    azithromycin (Hives; Pruritus)    Intolerances      Standing Inpatient Medications  acetaminophen  IVPB .. 1000 milliGRAM(s) IV Intermittent once  aspirin enteric coated 81 milliGRAM(s) Oral daily  atorvastatin 40 milliGRAM(s) Oral at bedtime  buMETAnide 3 milliGRAM(s) Oral every 12 hours  chlorhexidine 2% Cloths 1 Application(s) Topical daily  clopidogrel Tablet 75 milliGRAM(s) Oral daily  colchicine 0.6 milliGRAM(s) Oral daily  dextrose 5%. 1000 milliLiter(s) IV Continuous <Continuous>  dextrose 50% Injectable 12.5 Gram(s) IV Push once  dextrose 50% Injectable 25 Gram(s) IV Push once  dextrose 50% Injectable 25 Gram(s) IV Push once  docusate sodium 100 milliGRAM(s) Oral two times a day  heparin  Injectable 5000 Unit(s) SubCutaneous every 12 hours  hydrALAZINE 50 milliGRAM(s) Oral every 8 hours  insulin lispro (HumaLOG) corrective regimen sliding scale   SubCutaneous three times a day before meals  insulin lispro (HumaLOG) corrective regimen sliding scale   SubCutaneous at bedtime  insulin lispro Injectable (HumaLOG) 35 Unit(s) SubCutaneous three times a day before meals  insulin NPH/regular 70/30 Injectable 76 Unit(s) SubCutaneous before breakfast  insulin NPH/regular 70/30 Injectable 68 Unit(s) SubCutaneous before dinner  isosorbide   dinitrate Tablet (ISORDIL) 30 milliGRAM(s) Oral three times a day  lactated ringers. 1000 milliLiter(s) IV Continuous <Continuous>  levothyroxine 50 MICROGram(s) Oral daily  lidocaine   Patch 1 Patch Transdermal daily  melatonin 3 milliGRAM(s) Oral at bedtime  metoprolol succinate ER 25 milliGRAM(s) Oral daily  montelukast 10 milliGRAM(s) Oral daily  Nephro-enrico 1 Tablet(s) Oral daily  pantoprazole    Tablet 40 milliGRAM(s) Oral before breakfast  polyethylene glycol 3350 17 Gram(s) Oral daily  senna 2 Tablet(s) Oral at bedtime  spironolactone 25 milliGRAM(s) Oral daily    PRN Inpatient Medications  acetaminophen   Tablet .. 650 milliGRAM(s) Oral every 6 hours PRN  ALPRAZolam 0.25 milliGRAM(s) Oral three times a day PRN  dextrose 40% Gel 15 Gram(s) Oral once PRN  glucagon  Injectable 1 milliGRAM(s) IntraMuscular once PRN  traMADol 25 milliGRAM(s) Oral every 6 hours PRN      REVIEW OF SYSTEMS  --------------------------------------------------------------------------------  General: no fever  CVS: no chest pain  RESP: no sob, no cough  ABD: no abdominal pain  : no dysuria,  MSK: no edema     VITALS/PHYSICAL EXAM  --------------------------------------------------------------------------------  T(C): 36.7 (08-24-19 @ 11:36), Max: 36.9 (08-24-19 @ 04:20)  HR: 90 (08-24-19 @ 17:29) (78 - 90)  BP: 111/63 (08-24-19 @ 17:29) (104/56 - 116/53)  RR: 18 (08-24-19 @ 11:36) (18 - 18)  SpO2: 97% (08-24-19 @ 11:36) (97% - 100%)  Wt(kg): --        08-23-19 @ 07:01  -  08-24-19 @ 07:00  --------------------------------------------------------  IN: 895 mL / OUT: 1650 mL / NET: -755 mL    08-24-19 @ 07:01  -  08-24-19 @ 18:19  --------------------------------------------------------  IN: 460 mL / OUT: 900 mL / NET: -440 mL    Physical Exam:  	Gen: NAD  	HEENT: MMM  	Pulm: CTA B/L  	CV: S1S2  	Abd: Soft, +BS  	Ext: No LE edema B/L                      Neuro: Awake   	Skin: Warm and Dry       LABS/STUDIES  --------------------------------------------------------------------------------              9.7    10.3  >-----------<  429      [08-24-19 @ 06:35]              29.0     136  |  98  |  93  ----------------------------<  140      [08-24-19 @ 06:35]  3.9   |  19  |  1.94        Ca     9.5     [08-24-19 @ 06:35]    TPro  8.2  /  Alb  4.1  /  TBili  0.2  /  DBili  x   /  AST  23  /  ALT  23  /  AlkPhos  93  [08-23-19 @ 06:47]    Creatinine Trend:  SCr 1.94 [08-24 @ 06:35]  SCr 2.10 [08-23 @ 06:47]  SCr 2.04 [08-22 @ 11:47]  SCr 1.93 [08-22 @ 05:59]  SCr 2.02 [08-21 @ 06:52]    Urinalysis - [08-22-19 @ 16:43]      Color Light Yellow / Appearance Clear / SG 1.020 / pH 6.0      Gluc >=1000 mg/dL / Ketone Negative  / Bili Negative / Urobili <2 mg/dL       Blood Negative / Protein Negative / Leuk Est Negative / Nitrite Negative      RBC  / WBC  / Hyaline  / Gran  / Sq Epi  / Non Sq Epi  / Bacteria       Iron 42, TIBC 348, %sat 12      [07-05-19 @ 22:32]  Ferritin 130      [07-05-19 @ 22:32]  .4 (Ca --)      [04-25-19 @ 05:45]   --  PTH 43.55 (Ca --)      [09-10-18 @ 07:15]   --  PTH 36.60 (Ca --)      [09-08-18 @ 03:15]   --  Vitamin D (25OH) 25.9      [05-01-19 @ 04:00]  HbA1c 7.4      [07-05-19 @ 22:32]  TSH 2.00      [07-05-19 @ 22:32]  Lipid: chol 148, , HDL 35,       [06-01-19 @ 08:00]

## 2019-08-24 NOTE — CONSULT NOTE ADULT - PROBLEM SELECTOR RECOMMENDATION 9
No improvement in hyperglycemia despite 254 units of 70/30 and humalog insulin. 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 Start with lantus 50 units BID and Humalog 35 units sc TID.    - moderate sliding scale humalog sc TIDAC   - moderate sliding scale humalog sc qhs     Discharge  basal/bolus TBD   f/u outpatient endocrinologist    Case to be discussed with attn No improvement in hyperglycemia despite 254 units of 70/30 and humalog insulin. 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   Start with lantus 50 units BID and Humalog 35 units sc TID.    - moderate sliding scale humalog sc TIDAC   - moderate sliding scale humalog sc qhs     Discharge  basal/bolus TBD   f/u outpatient endocrinologist    Case to be discussed with attn No improvement in hyperglycemia despite 254 units of 70/30 and humalog insulin. 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   Start with lantus 50 units BID and Humalog 35 units sc TID.    - moderate sliding scale humalog sc TIDAC   - moderate sliding scale humalog sc qhs   - patient complaining of chest pain pressure, please consider troponin/ekg to r/o MI as cause of worsening glycemic control   Spoke with primary team.     Discharge  basal/bolus TBD   f/u outpatient endocrinologist    Case to be discussed with attn

## 2019-08-24 NOTE — CONSULT NOTE ADULT - PROBLEM SELECTOR PROBLEM 1
Fluid overload
Type 2 diabetes mellitus with other kidney complication
Acute on chronic systolic heart failure
Type 2 diabetes mellitus with other kidney complication
Fatigue, unspecified type

## 2019-08-24 NOTE — PROGRESS NOTE ADULT - ASSESSMENT
Pt is a 74 yo woman with PMHx of HTN, T2DM, CAD s/p CABG (LIMA to LAD, SVG to OM, SVG to PDA 2014 at [facility]), non-dilated ICM (EF 20-25%), severe mitral regurgitation s/p mitral clip (6/13/19 Dr. Newman), severe pulm HTN, CKD, hypothyroidism admitted with worsening SOB.    A/P:  MERVIN  Likely due to cardiogenic shock  Renal function remains stable at baseline but subject to fluctuations based on Renal Perfusion.  Pt currently on Bumex 3 mg PO Q12 and spironolactone 25mg daily  - Scr currently stable   - Continue diuretic therapy per cardiology.  - Optimize glucose control  - Monitor renal function   - Avoid further nephrotoxics, NSAIDS, RCA    Hyponatremia  in the setting of hyperglycemia.   Currently stable. Monitor   Optimize glucose control    CKD stage 4:  baseline Scr 1.8-2.0. Scr stable today.   Renal function fluctuates sec to CHF  Monitor renal function at present    SOB:  in setting of HF. Improved on diuretic therapy.   Continue diuretics per cardiology    Acidosis   Sec to Renal failure  Monitor serum Co2 at present    Hypokalemia:  in the setting of diuretic therapy.   Serum potassium stable      Anemia:   Hb stable  Pt with iron deficiency anemia.

## 2019-08-25 LAB
ANION GAP SERPL CALC-SCNC: 19 MMOL/L — HIGH (ref 5–17)
BUN SERPL-MCNC: 101 MG/DL — HIGH (ref 7–23)
CALCIUM SERPL-MCNC: 9.3 MG/DL — SIGNIFICANT CHANGE UP (ref 8.4–10.5)
CHLORIDE SERPL-SCNC: 97 MMOL/L — SIGNIFICANT CHANGE UP (ref 96–108)
CO2 SERPL-SCNC: 18 MMOL/L — LOW (ref 22–31)
CREAT SERPL-MCNC: 2.19 MG/DL — HIGH (ref 0.5–1.3)
GLUCOSE BLDC GLUCOMTR-MCNC: 238 MG/DL — HIGH (ref 70–99)
GLUCOSE BLDC GLUCOMTR-MCNC: 267 MG/DL — HIGH (ref 70–99)
GLUCOSE BLDC GLUCOMTR-MCNC: 274 MG/DL — HIGH (ref 70–99)
GLUCOSE BLDC GLUCOMTR-MCNC: 282 MG/DL — HIGH (ref 70–99)
GLUCOSE BLDC GLUCOMTR-MCNC: 370 MG/DL — HIGH (ref 70–99)
GLUCOSE BLDC GLUCOMTR-MCNC: 378 MG/DL — HIGH (ref 70–99)
GLUCOSE SERPL-MCNC: 255 MG/DL — HIGH (ref 70–99)
HCT VFR BLD CALC: 29.1 % — LOW (ref 34.5–45)
HGB BLD-MCNC: 9.5 G/DL — LOW (ref 11.5–15.5)
MCHC RBC-ENTMCNC: 28.2 PG — SIGNIFICANT CHANGE UP (ref 27–34)
MCHC RBC-ENTMCNC: 32.8 GM/DL — SIGNIFICANT CHANGE UP (ref 32–36)
MCV RBC AUTO: 86.1 FL — SIGNIFICANT CHANGE UP (ref 80–100)
PLATELET # BLD AUTO: 395 K/UL — SIGNIFICANT CHANGE UP (ref 150–400)
POTASSIUM SERPL-MCNC: 4.1 MMOL/L — SIGNIFICANT CHANGE UP (ref 3.5–5.3)
POTASSIUM SERPL-SCNC: 4.1 MMOL/L — SIGNIFICANT CHANGE UP (ref 3.5–5.3)
RBC # BLD: 3.38 M/UL — LOW (ref 3.8–5.2)
RBC # FLD: 15.9 % — HIGH (ref 10.3–14.5)
SODIUM SERPL-SCNC: 134 MMOL/L — LOW (ref 135–145)
WBC # BLD: 8.7 K/UL — SIGNIFICANT CHANGE UP (ref 3.8–10.5)
WBC # FLD AUTO: 8.7 K/UL — SIGNIFICANT CHANGE UP (ref 3.8–10.5)

## 2019-08-25 RX ORDER — TRAMADOL HYDROCHLORIDE 50 MG/1
25 TABLET ORAL EVERY 6 HOURS
Refills: 0 | Status: DISCONTINUED | OUTPATIENT
Start: 2019-08-25 | End: 2019-08-26

## 2019-08-25 RX ORDER — INSULIN LISPRO 100/ML
46 VIAL (ML) SUBCUTANEOUS
Refills: 0 | Status: DISCONTINUED | OUTPATIENT
Start: 2019-08-25 | End: 2019-08-26

## 2019-08-25 RX ORDER — INSULIN LISPRO 100/ML
2 VIAL (ML) SUBCUTANEOUS ONCE
Refills: 0 | Status: COMPLETED | OUTPATIENT
Start: 2019-08-25 | End: 2019-08-25

## 2019-08-25 RX ADMIN — Medication 3: at 08:24

## 2019-08-25 RX ADMIN — Medication 3: at 12:23

## 2019-08-25 RX ADMIN — CLOPIDOGREL BISULFATE 75 MILLIGRAM(S): 75 TABLET, FILM COATED ORAL at 12:26

## 2019-08-25 RX ADMIN — Medication 1 TABLET(S): at 12:26

## 2019-08-25 RX ADMIN — Medication 3 MILLIGRAM(S): at 21:13

## 2019-08-25 RX ADMIN — Medication 0.25 MILLIGRAM(S): at 23:15

## 2019-08-25 RX ADMIN — Medication 50 MILLIGRAM(S): at 05:02

## 2019-08-25 RX ADMIN — Medication 100 MILLIGRAM(S): at 05:02

## 2019-08-25 RX ADMIN — BUMETANIDE 3 MILLIGRAM(S): 0.25 INJECTION INTRAMUSCULAR; INTRAVENOUS at 05:02

## 2019-08-25 RX ADMIN — PANTOPRAZOLE SODIUM 40 MILLIGRAM(S): 20 TABLET, DELAYED RELEASE ORAL at 05:03

## 2019-08-25 RX ADMIN — Medication 2 UNIT(S): at 00:35

## 2019-08-25 RX ADMIN — ISOSORBIDE DINITRATE 30 MILLIGRAM(S): 5 TABLET ORAL at 21:14

## 2019-08-25 RX ADMIN — Medication 50 MILLIGRAM(S): at 13:24

## 2019-08-25 RX ADMIN — HEPARIN SODIUM 5000 UNIT(S): 5000 INJECTION INTRAVENOUS; SUBCUTANEOUS at 17:35

## 2019-08-25 RX ADMIN — Medication 0.25 MILLIGRAM(S): at 00:34

## 2019-08-25 RX ADMIN — Medication 75 UNIT(S): at 17:04

## 2019-08-25 RX ADMIN — Medication 40 UNIT(S): at 08:25

## 2019-08-25 RX ADMIN — LIDOCAINE 1 PATCH: 4 CREAM TOPICAL at 10:00

## 2019-08-25 RX ADMIN — ISOSORBIDE DINITRATE 30 MILLIGRAM(S): 5 TABLET ORAL at 13:24

## 2019-08-25 RX ADMIN — TRAMADOL HYDROCHLORIDE 25 MILLIGRAM(S): 50 TABLET ORAL at 23:12

## 2019-08-25 RX ADMIN — ATORVASTATIN CALCIUM 40 MILLIGRAM(S): 80 TABLET, FILM COATED ORAL at 21:13

## 2019-08-25 RX ADMIN — Medication 50 MICROGRAM(S): at 05:02

## 2019-08-25 RX ADMIN — Medication 3: at 21:57

## 2019-08-25 RX ADMIN — LIDOCAINE 1 PATCH: 4 CREAM TOPICAL at 21:14

## 2019-08-25 RX ADMIN — SENNA PLUS 2 TABLET(S): 8.6 TABLET ORAL at 21:13

## 2019-08-25 RX ADMIN — TRAMADOL HYDROCHLORIDE 25 MILLIGRAM(S): 50 TABLET ORAL at 21:56

## 2019-08-25 RX ADMIN — HEPARIN SODIUM 5000 UNIT(S): 5000 INJECTION INTRAVENOUS; SUBCUTANEOUS at 05:03

## 2019-08-25 RX ADMIN — Medication 50 MILLIGRAM(S): at 21:13

## 2019-08-25 RX ADMIN — Medication 46 UNIT(S): at 17:02

## 2019-08-25 RX ADMIN — LIDOCAINE 1 PATCH: 4 CREAM TOPICAL at 07:21

## 2019-08-25 RX ADMIN — Medication 100 MILLIGRAM(S): at 17:34

## 2019-08-25 RX ADMIN — Medication 0.6 MILLIGRAM(S): at 12:26

## 2019-08-25 RX ADMIN — CHLORHEXIDINE GLUCONATE 1 APPLICATION(S): 213 SOLUTION TOPICAL at 12:27

## 2019-08-25 RX ADMIN — Medication 40 UNIT(S): at 12:23

## 2019-08-25 RX ADMIN — Medication 5: at 17:01

## 2019-08-25 RX ADMIN — MONTELUKAST 10 MILLIGRAM(S): 4 TABLET, CHEWABLE ORAL at 12:25

## 2019-08-25 RX ADMIN — BUMETANIDE 3 MILLIGRAM(S): 0.25 INJECTION INTRAMUSCULAR; INTRAVENOUS at 17:35

## 2019-08-25 RX ADMIN — Medication 25 MILLIGRAM(S): at 05:03

## 2019-08-25 RX ADMIN — Medication 85 UNIT(S): at 08:27

## 2019-08-25 RX ADMIN — Medication 81 MILLIGRAM(S): at 12:25

## 2019-08-25 RX ADMIN — SPIRONOLACTONE 25 MILLIGRAM(S): 25 TABLET, FILM COATED ORAL at 05:03

## 2019-08-25 RX ADMIN — ISOSORBIDE DINITRATE 30 MILLIGRAM(S): 5 TABLET ORAL at 05:02

## 2019-08-25 NOTE — PROGRESS NOTE ADULT - ASSESSMENT
Pt is a 74 yo woman with PMHx of HTN, T2DM, CAD s/p CABG (LIMA to LAD, SVG to OM, SVG to PDA 2014 at [facility]), non-dilated ICM (EF 20-25%), severe mitral regurgitation s/p mitral clip (6/13/19 Dr. Newman), severe pulm HTN, CKD, hypothyroidism admitted with worsening SOB.    A/P:  MERVIN  Likely due to cardiogenic shock  Renal function remains stable today  subject to fluctuations based on Renal Perfusion.  Pt currently on Bumex 3 mg PO Q12 and spironolactone 25mg daily  - Scr currently stable   - Continue diuretic therapy per cardiology.  - Optimize glucose control  - Monitor renal function   - Avoid further nephrotoxics, NSAIDS, RCA    Hyponatremia  in the setting of hyperglycemia.   Currently stable. Monitor   Optimize glucose control    CKD stage 4:  baseline Scr 1.8-2.0. Scr stable today.   Renal function fluctuates sec to CHF  Monitor renal function at present    SOB:  in setting of HF. Improved on diuretic therapy.   Continue diuretics per cardiology    Acidosis   Sec to Renal failure  Monitor serum Co2 at present    Hypokalemia:  in the setting of diuretic therapy.   Serum potassium stable      Anemia:   Hb stable  Pt with iron deficiency anemia.

## 2019-08-25 NOTE — CHART NOTE - NSCHARTNOTEFT_GEN_A_CORE
Dr. Banks following patient and recs are followed by team. Service endocrine will sign off, please call if there are any questions.

## 2019-08-25 NOTE — PROGRESS NOTE ADULT - SUBJECTIVE AND OBJECTIVE BOX
[facility] [place] [facility]   MD GONZALEZ SHAH D.O, PA    TELEPHONE NUMBERS:  OFFICE: [phone]  DR. SANTOYO CELL: [phone]  JUSTEN FIELD CELL: [phone]  DR. RIDER CELL:  [phone]    RENAL FOLLOW UP NOTE  --------------------------------------------------------------------------------  HPI: Pt seen and examined at bedside. Pt admits to fatigue today  Denies SOB, chest pain     PAST HISTORY  --------------------------------------------------------------------------------  No significant changes to PMH, PSH, FHx, SHx, unless otherwise noted    ALLERGIES & MEDICATIONS  --------------------------------------------------------------------------------  Allergies    azithromycin (Hives; Pruritus)    Intolerances      Standing Inpatient Medications  acetaminophen  IVPB .. 1000 milliGRAM(s) IV Intermittent once  aspirin enteric coated 81 milliGRAM(s) Oral daily  atorvastatin 40 milliGRAM(s) Oral at bedtime  buMETAnide 3 milliGRAM(s) Oral every 12 hours  chlorhexidine 2% Cloths 1 Application(s) Topical daily  clopidogrel Tablet 75 milliGRAM(s) Oral daily  colchicine 0.6 milliGRAM(s) Oral daily  dextrose 5%. 1000 milliLiter(s) IV Continuous <Continuous>  dextrose 50% Injectable 12.5 Gram(s) IV Push once  dextrose 50% Injectable 25 Gram(s) IV Push once  dextrose 50% Injectable 25 Gram(s) IV Push once  docusate sodium 100 milliGRAM(s) Oral two times a day  heparin  Injectable 5000 Unit(s) SubCutaneous every 12 hours  hydrALAZINE 50 milliGRAM(s) Oral every 8 hours  insulin lispro (HumaLOG) corrective regimen sliding scale   SubCutaneous three times a day before meals  insulin lispro (HumaLOG) corrective regimen sliding scale   SubCutaneous at bedtime  insulin lispro Injectable (HumaLOG) 40 Unit(s) SubCutaneous three times a day before meals  insulin NPH/regular 70/30 Injectable 85 Unit(s) SubCutaneous before breakfast  insulin NPH/regular 70/30 Injectable 75 Unit(s) SubCutaneous before dinner  isosorbide   dinitrate Tablet (ISORDIL) 30 milliGRAM(s) Oral three times a day  lactated ringers. 1000 milliLiter(s) IV Continuous <Continuous>  levothyroxine 50 MICROGram(s) Oral daily  lidocaine   Patch 1 Patch Transdermal daily  melatonin 3 milliGRAM(s) Oral at bedtime  metoprolol succinate ER 25 milliGRAM(s) Oral daily  montelukast 10 milliGRAM(s) Oral daily  Nephro-enrico 1 Tablet(s) Oral daily  pantoprazole    Tablet 40 milliGRAM(s) Oral before breakfast  polyethylene glycol 3350 17 Gram(s) Oral daily  senna 2 Tablet(s) Oral at bedtime  spironolactone 25 milliGRAM(s) Oral daily    PRN Inpatient Medications  acetaminophen   Tablet .. 650 milliGRAM(s) Oral every 6 hours PRN  ALPRAZolam 0.25 milliGRAM(s) Oral three times a day PRN  dextrose 40% Gel 15 Gram(s) Oral once PRN  glucagon  Injectable 1 milliGRAM(s) IntraMuscular once PRN  traMADol 25 milliGRAM(s) Oral every 6 hours PRN      REVIEW OF SYSTEMS  --------------------------------------------------------------------------------  General: no fever  CVS: no chest pain  RESP: no sob, no cough  ABD: no abdominal pain  : no dysuria  MSK: no edema     VITALS/PHYSICAL EXAM  --------------------------------------------------------------------------------  T(C): 36.6 (08-25-19 @ 04:41), Max: 36.8 (08-24-19 @ 20:25)  HR: 79 (08-25-19 @ 04:41) (79 - 90)  BP: 112/64 (08-25-19 @ 04:41) (108/65 - 117/71)  RR: 18 (08-25-19 @ 04:41) (18 - 18)  SpO2: 100% (08-25-19 @ 04:41) (97% - 100%)  Wt(kg): --    08-24-19 @ 07:01  -  08-25-19 @ 07:00  --------------------------------------------------------  IN: 760 mL / OUT: 1750 mL / NET: -990 mL      Physical Exam:  	Gen: NAD  	HEENT: MMM  	Pulm: CTA B/L  	CV: S1S2  	Abd: Soft, +BS  	Ext: No LE edema B/L                      Neuro: Awake   	Skin: Warm and Dry     LABS/STUDIES  --------------------------------------------------------------------------------              9.5    8.7   >-----------<  395      [08-25-19 @ 06:30]              29.1     134  |  97  |  101  ----------------------------<  255      [08-25-19 @ 06:30]  4.1   |  18  |  2.19        Ca     9.3     [08-25-19 @ 06:30]    Creatinine Trend:  SCr 2.19 [08-25 @ 06:30]  SCr 1.94 [08-24 @ 06:35]  SCr 2.10 [08-23 @ 06:47]  SCr 2.04 [08-22 @ 11:47]  SCr 1.93 [08-22 @ 05:59]    Urinalysis - [08-22-19 @ 16:43]      Color Light Yellow / Appearance Clear / SG 1.020 / pH 6.0      Gluc >=1000 mg/dL / Ketone Negative  / Bili Negative / Urobili <2 mg/dL       Blood Negative / Protein Negative / Leuk Est Negative / Nitrite Negative      RBC  / WBC  / Hyaline  / Gran  / Sq Epi  / Non Sq Epi  / Bacteria       Iron 42, TIBC 348, %sat 12      [07-05-19 @ 22:32]  Ferritin 130      [07-05-19 @ 22:32]  .4 (Ca --)      [04-25-19 @ 05:45]   --  PTH 43.55 (Ca --)      [09-10-18 @ 07:15]   --  PTH 36.60 (Ca --)      [09-08-18 @ 03:15]   --  Vitamin D (25OH) 25.9      [05-01-19 @ 04:00]  HbA1c 7.4      [07-05-19 @ 22:32]  TSH 2.00      [07-05-19 @ 22:32]  Lipid: chol 148, , HDL 35,       [06-01-19 @ 08:00]

## 2019-08-25 NOTE — PROGRESS NOTE ADULT - ASSESSMENT
Assessment  DMT2: 73y Female with DM T2 with hyperglycemia on insulin, sugars overall down-trending but still running high, she is eating partial meals.  CAD: S/P cabg On medications, stable, monitored.  Gout: Rheum consult done, on colchiceine still has ankle pain.  Hypothyroidism: synthroid 50 mcg po daily, asymptomatic.  HTN: Controlled, On med.  HLD; on statin  CKD: Monitor labs/BMP        Plan:   DMT2:  Will increase Humalog to 46u TID before each meal, and continue mixed insulin 85u am, 75u pm, If sugars remain high may consider U500 insulin, will FU. Scar tissue may be impeding insulin absorption, suggested that nurse try new injection sites.  Will continue monitoring FS, log, and follow up. Spoke with patient and primary team.  CAD: S/P cabg On medications, stable, monitored.  Hypothyroidism: Continue synthroid 50 mcg po daily, asymptomatic. Ordered TFTs for tomorrow AM, will FU.  HTN: Continue treatment, monitoring, Primary team FU  HLD; on statin  CKD: Continue monitoring labs, renal FU Assessment  DMT2: 73y Female with DM T2 with hyperglycemia on insulin, sugars overall down-trending but still running high, she is eating partial meals.  CAD: S/P cabg On medications, stable, monitored.  Gout: Rheum consult done, on colchiceine still has ankle pain.  Hypothyroidism: synthroid 50 mcg po daily, asymptomatic.  HTN: Controlled, On med.  HLD; on statin  CKD: Monitor labs/BMP      Plan:   DMT2:  Will increase Humalog to 46u TID before each meal, and continue mixed insulin 85u am, 75u pm, If sugars remain high may consider U500 insulin, will FU. Scar tissue may be impeding insulin absorption, suggested that nurse try new injection sites.  Will continue monitoring FS, log, and follow up. Spoke with patient and primary team.  CAD: S/P cabg On medications, stable, monitored.  Gout: Rheum consult done, on colchiceine  Hypothyroidism: Continue synthroid 50 mcg po daily, asymptomatic. Ordered TFTs for tomorrow AM, will FU.  HTN: Continue treatment, monitoring, Primary team FU  HLD; on statin  CKD: Continue monitoring labs, renal FU

## 2019-08-25 NOTE — PROGRESS NOTE ADULT - SUBJECTIVE AND OBJECTIVE BOX
CHIEF COMPLAINT:    SUBJECTIVE:     REVIEW OF SYSTEMS:    CONSTITUTIONAL: (  )  weakness,  (  ) fevers or chills  EYES/ENT: (  )visual changes;     NECK: (  ) pain or stiffness  RESPIRATORY:   (  )cough, wheezing, hemoptysis;  (  ) shortness of breath  CARDIOVASCULAR:  (  )chest pain or palpitations  GASTROINTESTINAL:   (  )abdominal or epigastric pain.  (  ) nausea, vomiting, or hematemesis;   (   ) diarrhea or constipation.   GENITOURINARY:   (    ) dysuria, frequency or hematuria  NEUROLOGICAL:  (   ) numbness or weakness   All other review of systems is negative unless indicated above    Vital Signs Last 24 Hrs  T(C): 36.6 (25 Aug 2019 04:41), Max: 36.8 (24 Aug 2019 20:25)  T(F): 97.8 (25 Aug 2019 04:41), Max: 98.2 (24 Aug 2019 20:25)  HR: 79 (25 Aug 2019 04:41) (79 - 90)  BP: 112/64 (25 Aug 2019 04:41) (108/65 - 117/71)  BP(mean): --  RR: 18 (25 Aug 2019 04:41) (18 - 18)  SpO2: 100% (25 Aug 2019 04:41) (97% - 100%)    I&O's Summary    24 Aug 2019 07:01  -  25 Aug 2019 07:00  --------------------------------------------------------  IN: 760 mL / OUT: 1750 mL / NET: -990 mL        CAPILLARY BLOOD GLUCOSE      POCT Blood Glucose.: 274 mg/dL (25 Aug 2019 07:51)  POCT Blood Glucose.: 238 mg/dL (25 Aug 2019 04:43)  POCT Blood Glucose.: 282 mg/dL (25 Aug 2019 00:07)  POCT Blood Glucose.: 273 mg/dL (24 Aug 2019 21:15)  POCT Blood Glucose.: 315 mg/dL (24 Aug 2019 16:41)  POCT Blood Glucose.: 379 mg/dL (24 Aug 2019 12:07)      PHYSICAL EXAM:    Constitutional:  (   ) NAD,   (   )awake and alert  HEENT: PERR, EOMI,    Neck: Soft and supple, No LAD, No JVD  Respiratory:  (    Breath sounds are clear bilaterally,    (   ) wheezing, rales or rhonchi  Cardiovascular:     (   )S1 and S2, regular rate and rhythm, no Murmurs, gallops or rubs  Gastrointestinal:  (   )Bowel Sounds present, soft,   (  )nontender, nondistended,    Extremities:    (  ) peripheral edema  Vascular: 2+ peripheral pulses  Neurological:    (    )A/O x 3,   (  ) focal deficits  Musculoskeletal:    (   )  normal strength b/l upper  (     ) normal  lower extremities  Skin: No rashes    MEDICATIONS:  MEDICATIONS  (STANDING):  acetaminophen  IVPB .. 1000 milliGRAM(s) IV Intermittent once  aspirin enteric coated 81 milliGRAM(s) Oral daily  atorvastatin 40 milliGRAM(s) Oral at bedtime  buMETAnide 3 milliGRAM(s) Oral every 12 hours  chlorhexidine 2% Cloths 1 Application(s) Topical daily  clopidogrel Tablet 75 milliGRAM(s) Oral daily  colchicine 0.6 milliGRAM(s) Oral daily  dextrose 5%. 1000 milliLiter(s) (50 mL/Hr) IV Continuous <Continuous>  dextrose 50% Injectable 12.5 Gram(s) IV Push once  dextrose 50% Injectable 25 Gram(s) IV Push once  dextrose 50% Injectable 25 Gram(s) IV Push once  docusate sodium 100 milliGRAM(s) Oral two times a day  heparin  Injectable 5000 Unit(s) SubCutaneous every 12 hours  hydrALAZINE 50 milliGRAM(s) Oral every 8 hours  insulin lispro (HumaLOG) corrective regimen sliding scale   SubCutaneous three times a day before meals  insulin lispro (HumaLOG) corrective regimen sliding scale   SubCutaneous at bedtime  insulin lispro Injectable (HumaLOG) 40 Unit(s) SubCutaneous three times a day before meals  insulin NPH/regular 70/30 Injectable 85 Unit(s) SubCutaneous before breakfast  insulin NPH/regular 70/30 Injectable 75 Unit(s) SubCutaneous before dinner  isosorbide   dinitrate Tablet (ISORDIL) 30 milliGRAM(s) Oral three times a day  lactated ringers. 1000 milliLiter(s) (75 mL/Hr) IV Continuous <Continuous>  levothyroxine 50 MICROGram(s) Oral daily  lidocaine   Patch 1 Patch Transdermal daily  melatonin 3 milliGRAM(s) Oral at bedtime  metoprolol succinate ER 25 milliGRAM(s) Oral daily  montelukast 10 milliGRAM(s) Oral daily  Nephro-enrico 1 Tablet(s) Oral daily  pantoprazole    Tablet 40 milliGRAM(s) Oral before breakfast  polyethylene glycol 3350 17 Gram(s) Oral daily  senna 2 Tablet(s) Oral at bedtime  spironolactone 25 milliGRAM(s) Oral daily      LABS: All Labs Reviewed:                        9.5    8.7   )-----------( 395      ( 25 Aug 2019 06:30 )             29.1     08-25    134<L>  |  97  |  101<H>  ----------------------------<  255<H>  4.1   |  18<L>  |  2.19<H>    Ca    9.3      25 Aug 2019 06:30            Blood Culture: 08-22 @ 14:50  Organism --  Gram Stain Blood -- Gram Stain --  Specimen Source .Blood  Culture-Blood --      Urine Culture      RADIOLOGY/EKG:    ASSESSMENT AND PLAN:    DVT PPX:    ADVANCED DIRECTIVE:    DISPOSITION: CHIEF COMPLAINT: no event overnight  ·	patient seen and examined in the bed awake and verbal without SOB  but complained of weakness.  still with elevated blood sugar but it less than 400 now but only partial improvement    SUBJECTIVE:     REVIEW OF SYSTEMS:    CONSTITUTIONAL: (x  )  weakness,  (  ) fevers or chills  EYES/ENT: (  )visual changes;     NECK: (  ) pain or stiffness  RESPIRATORY:   (  )cough, wheezing, hemoptysis;  (  ) shortness of breath  CARDIOVASCULAR:  (  )chest pain or palpitations  GASTROINTESTINAL:   (  )abdominal or epigastric pain.  (  ) nausea, vomiting, or hematemesis;   (   ) diarrhea or constipation.   GENITOURINARY:   (    ) dysuria, frequency or hematuria  NEUROLOGICAL:  (   ) numbness or weakness   All other review of systems is negative unless indicated above    Vital Signs Last 24 Hrs  T(C): 36.6 (25 Aug 2019 04:41), Max: 36.8 (24 Aug 2019 20:25)  T(F): 97.8 (25 Aug 2019 04:41), Max: 98.2 (24 Aug 2019 20:25)  HR: 79 (25 Aug 2019 04:41) (79 - 90)  BP: 112/64 (25 Aug 2019 04:41) (108/65 - 117/71)  BP(mean): --  RR: 18 (25 Aug 2019 04:41) (18 - 18)  SpO2: 100% (25 Aug 2019 04:41) (97% - 100%)    I&O's Summary    24 Aug 2019 07:01  -  25 Aug 2019 07:00  --------------------------------------------------------  IN: 760 mL / OUT: 1750 mL / NET: -990 mL        CAPILLARY BLOOD GLUCOSE      POCT Blood Glucose.: 274 mg/dL (25 Aug 2019 07:51)  POCT Blood Glucose.: 238 mg/dL (25 Aug 2019 04:43)  POCT Blood Glucose.: 282 mg/dL (25 Aug 2019 00:07)  POCT Blood Glucose.: 273 mg/dL (24 Aug 2019 21:15)  POCT Blood Glucose.: 315 mg/dL (24 Aug 2019 16:41)  POCT Blood Glucose.: 379 mg/dL (24 Aug 2019 12:07)    POCT Blood Glucose.: 379 mg/dL (24 Aug 2019 12:07)  POCT Blood Glucose.: 239 mg/dL (24 Aug 2019 07:54)  POCT Blood Glucose.: 293 mg/dL (23 Aug 2019 21:29)  POCT Blood Glucose.: 357 mg/dL (23 Aug 2019 17:00)  PHYSICAL EXAM:    Constitutional:  ( x  ) NAD,   ( x  )awake and alert  HEENT: PERR, EOMI,    Neck: Soft and supple, No LAD, No JVD  Respiratory:  (  x  Breath sounds are clear bilaterally,    (   ) wheezing, rales or rhonchi  Cardiovascular:     (  x )S1 and S2, regular rate and rhythm, no Murmurs, gallops or rubs  Gastrointestinal:  (  x )Bowel Sounds present, soft,   (  )nontender, nondistended,    Extremities:    (  ) peripheral edema  Vascular: 2+ peripheral pulses  Neurological:    (  x  )A/O x 3,   (  ) focal deficits  Musculoskeletal:    ( x  )  normal strength b/l upper  (     ) normal  lower extremities  Skin: No rashes    MEDICATIONS:  MEDICATIONS  (STANDING):  acetaminophen  IVPB .. 1000 milliGRAM(s) IV Intermittent once  aspirin enteric coated 81 milliGRAM(s) Oral daily  atorvastatin 40 milliGRAM(s) Oral at bedtime  buMETAnide 3 milliGRAM(s) Oral every 12 hours  chlorhexidine 2% Cloths 1 Application(s) Topical daily  clopidogrel Tablet 75 milliGRAM(s) Oral daily  colchicine 0.6 milliGRAM(s) Oral daily  dextrose 5%. 1000 milliLiter(s) (50 mL/Hr) IV Continuous <Continuous>  dextrose 50% Injectable 12.5 Gram(s) IV Push once  dextrose 50% Injectable 25 Gram(s) IV Push once  dextrose 50% Injectable 25 Gram(s) IV Push once  docusate sodium 100 milliGRAM(s) Oral two times a day  heparin  Injectable 5000 Unit(s) SubCutaneous every 12 hours  hydrALAZINE 50 milliGRAM(s) Oral every 8 hours  insulin lispro (HumaLOG) corrective regimen sliding scale   SubCutaneous three times a day before meals  insulin lispro (HumaLOG) corrective regimen sliding scale   SubCutaneous at bedtime  insulin lispro Injectable (HumaLOG) 40 Unit(s) SubCutaneous three times a day before meals  insulin NPH/regular 70/30 Injectable 85 Unit(s) SubCutaneous before breakfast  insulin NPH/regular 70/30 Injectable 75 Unit(s) SubCutaneous before dinner  isosorbide   dinitrate Tablet (ISORDIL) 30 milliGRAM(s) Oral three times a day  lactated ringers. 1000 milliLiter(s) (75 mL/Hr) IV Continuous <Continuous>  levothyroxine 50 MICROGram(s) Oral daily  lidocaine   Patch 1 Patch Transdermal daily  melatonin 3 milliGRAM(s) Oral at bedtime  metoprolol succinate ER 25 milliGRAM(s) Oral daily  montelukast 10 milliGRAM(s) Oral daily  Nephro-enrico 1 Tablet(s) Oral daily  pantoprazole    Tablet 40 milliGRAM(s) Oral before breakfast  polyethylene glycol 3350 17 Gram(s) Oral daily  senna 2 Tablet(s) Oral at bedtime  spironolactone 25 milliGRAM(s) Oral daily      LABS: All Labs Reviewed:                        9.5    8.7   )-----------( 395      ( 25 Aug 2019 06:30 )             29.1     08-25    134<L>  |  97  |  101<H>  ----------------------------<  255<H>  4.1   |  18<L>  |  2.19<H>    Ca    9.3      25 Aug 2019 06:30            Blood Culture: 08-22 @ 14:50  Organism --  Gram Stain Blood -- Gram Stain --  Specimen Source .Blood  Culture-Blood --      Urine Culture      RADIOLOGY/EKG:    ASSESSMENT AND PLAN:  74 yo woman with PMHx of HTN, T2DM (A1c 7.4%), CAD s/p CABG (LIMA to LAD, SVG to OM, SVG to PDA 2014 at [facility]), non-dilated ICM (EF 20-25%), severe mitral regurgitation s/p mitral clip (6/13/19 Dr. Nweman), severe pulm HTN, CKD, hypothyroidism, who is presenting to ED after experiencing worsening SOB and intermittent chest pain for 1 week at [facility]. Of note, pt was recently discharged on 6/19 after having mitral clip procedure completed. She initially required BiPAP with improvement in her respiratory status following diuresis. Initiated on vasopressors and inotropes in CCU, which were subsequently weaned off. Infectious work up was negative.    #HFrEF likely 2/2 ICM with MR s/p alonso clip  -  continue with Bumex 3 mg twice daily        - continue hydralazine 50 mg TID and isordil 30 TID  - continue spironolactone 25 mg  - continue Metoprolol Succinate 25 mg PO Daily   - No further cardiac testing at this time.      #CAD  - continue ASA and statin   - Pt with chest pain, but also with end stage cardiomyopathy, coronary disease, had MitraClip and no further intervention feasible. She should not have trops checked.     #SVT - likely Atrial Tach vs. Atrial Flutter   - Amio initially not given due to elevated LFTs likely in the setting of congestion.   - recheck LFTs.   - Monitor on tele.   - continue metoprolol at current dose as above  - If pt has SVT again can consider amio   #diabetes     on   NPH 70/30   85 units before breakfast and 75 units before dinner and 35 regular insulin 3 times a day with coverage discussed with the medical team in detail MICU rejected the case for IV insulin   second opinion from    house endocrinologist appreciated.   will not  change to the recommendation at present time While she was on Lantus still her blood sugar was high.   continue above management .    just FYI U500 insulin not available at the hospital only for outpatient    DVT PPX:    ADVANCED DIRECTIVE:    DISPOSITION:

## 2019-08-25 NOTE — PROGRESS NOTE ADULT - SUBJECTIVE AND OBJECTIVE BOX
Chief complaint  Patient is a 73y old  Female who presents with a chief complaint of Hypoxia, SOB (25 Aug 2019 10:23)   Review of systems  Patient in bed, looks comfortable, no fever, no hypoglycemia.    Labs and Fingersticks  CAPILLARY BLOOD GLUCOSE      POCT Blood Glucose.: 267 mg/dL (25 Aug 2019 11:32)  POCT Blood Glucose.: 274 mg/dL (25 Aug 2019 07:51)  POCT Blood Glucose.: 238 mg/dL (25 Aug 2019 04:43)  POCT Blood Glucose.: 282 mg/dL (25 Aug 2019 00:07)  POCT Blood Glucose.: 273 mg/dL (24 Aug 2019 21:15)  POCT Blood Glucose.: 315 mg/dL (24 Aug 2019 16:41)      Anion Gap, Serum: 19 <H> (08-25 @ 06:30)  Anion Gap, Serum: 19 <H> (08-24 @ 06:35)      Calcium, Total Serum: 9.3 (08-25 @ 06:30)  Calcium, Total Serum: 9.5 (08-24 @ 06:35)          08-25    134<L>  |  97  |  101<H>  ----------------------------<  255<H>  4.1   |  18<L>  |  2.19<H>    Ca    9.3      25 Aug 2019 06:30                          9.5    8.7   )-----------( 395      ( 25 Aug 2019 06:30 )             29.1     Medications  MEDICATIONS  (STANDING):  acetaminophen  IVPB .. 1000 milliGRAM(s) IV Intermittent once  aspirin enteric coated 81 milliGRAM(s) Oral daily  atorvastatin 40 milliGRAM(s) Oral at bedtime  buMETAnide 3 milliGRAM(s) Oral every 12 hours  chlorhexidine 2% Cloths 1 Application(s) Topical daily  clopidogrel Tablet 75 milliGRAM(s) Oral daily  colchicine 0.6 milliGRAM(s) Oral daily  dextrose 5%. 1000 milliLiter(s) (50 mL/Hr) IV Continuous <Continuous>  dextrose 50% Injectable 12.5 Gram(s) IV Push once  dextrose 50% Injectable 25 Gram(s) IV Push once  dextrose 50% Injectable 25 Gram(s) IV Push once  docusate sodium 100 milliGRAM(s) Oral two times a day  heparin  Injectable 5000 Unit(s) SubCutaneous every 12 hours  hydrALAZINE 50 milliGRAM(s) Oral every 8 hours  insulin lispro (HumaLOG) corrective regimen sliding scale   SubCutaneous three times a day before meals  insulin lispro (HumaLOG) corrective regimen sliding scale   SubCutaneous at bedtime  insulin lispro Injectable (HumaLOG) 46 Unit(s) SubCutaneous three times a day with meals  insulin NPH/regular 70/30 Injectable 85 Unit(s) SubCutaneous before breakfast  insulin NPH/regular 70/30 Injectable 75 Unit(s) SubCutaneous before dinner  isosorbide   dinitrate Tablet (ISORDIL) 30 milliGRAM(s) Oral three times a day  lactated ringers. 1000 milliLiter(s) (75 mL/Hr) IV Continuous <Continuous>  levothyroxine 50 MICROGram(s) Oral daily  lidocaine   Patch 1 Patch Transdermal daily  melatonin 3 milliGRAM(s) Oral at bedtime  metoprolol succinate ER 25 milliGRAM(s) Oral daily  montelukast 10 milliGRAM(s) Oral daily  Nephro-enrico 1 Tablet(s) Oral daily  pantoprazole    Tablet 40 milliGRAM(s) Oral before breakfast  polyethylene glycol 3350 17 Gram(s) Oral daily  senna 2 Tablet(s) Oral at bedtime  spironolactone 25 milliGRAM(s) Oral daily      Physical Exam  General: Patient comfortable in bed  Vital Signs Last 12 Hrs  T(F): 97.4 (08-25-19 @ 11:45), Max: 97.8 (08-25-19 @ 04:41)  HR: 86 (08-25-19 @ 11:45) (79 - 86)  BP: 107/67 (08-25-19 @ 11:45) (107/67 - 112/64)  BP(mean): --  RR: 18 (08-25-19 @ 11:45) (18 - 18)  SpO2: 100% (08-25-19 @ 11:45) (100% - 100%)  Neck: No palpable thyroid nodules.  CVS: S1S2, No murmurs  Respiratory: No wheezing, no crepitations  GI: Abdomen soft, bowel sounds positive  Musculoskeletal:  edema lower extremities.   Skin: No skin rashes, no ecchymosis    Diagnostics    Cortisol AM, Serum: AM Sched. Collection: 18-Aug-2019 06:00 (08-17 @ 12:14)  Cortisol AM, Serum: AM Sched. Collection: 19-Aug-2019 06:00 (08-18 @ 12:59)  Thyroid Stimulating Hormone, Serum: AM Sched. Collection: 26-Aug-2019 06:00 (08-25 @ 12:31)  Free Thyroxine, Serum: AM Sched. Collection: 26-Aug-2019 06:00 (08-25 @ 12:31)

## 2019-08-26 LAB
ANION GAP SERPL CALC-SCNC: 18 MMOL/L — HIGH (ref 5–17)
BUN SERPL-MCNC: 104 MG/DL — HIGH (ref 7–23)
CALCIUM SERPL-MCNC: 9 MG/DL — SIGNIFICANT CHANGE UP (ref 8.4–10.5)
CHLORIDE SERPL-SCNC: 100 MMOL/L — SIGNIFICANT CHANGE UP (ref 96–108)
CO2 SERPL-SCNC: 16 MMOL/L — LOW (ref 22–31)
CREAT SERPL-MCNC: 2.14 MG/DL — HIGH (ref 0.5–1.3)
GLUCOSE BLDC GLUCOMTR-MCNC: 207 MG/DL — HIGH (ref 70–99)
GLUCOSE BLDC GLUCOMTR-MCNC: 224 MG/DL — HIGH (ref 70–99)
GLUCOSE BLDC GLUCOMTR-MCNC: 229 MG/DL — HIGH (ref 70–99)
GLUCOSE BLDC GLUCOMTR-MCNC: 249 MG/DL — HIGH (ref 70–99)
GLUCOSE BLDC GLUCOMTR-MCNC: 268 MG/DL — HIGH (ref 70–99)
GLUCOSE BLDC GLUCOMTR-MCNC: 304 MG/DL — HIGH (ref 70–99)
GLUCOSE BLDC GLUCOMTR-MCNC: 318 MG/DL — HIGH (ref 70–99)
GLUCOSE SERPL-MCNC: 203 MG/DL — HIGH (ref 70–99)
HCT VFR BLD CALC: 28.2 % — LOW (ref 34.5–45)
HGB BLD-MCNC: 9.3 G/DL — LOW (ref 11.5–15.5)
MAGNESIUM SERPL-MCNC: 2.4 MG/DL — SIGNIFICANT CHANGE UP (ref 1.6–2.6)
MCHC RBC-ENTMCNC: 28.2 PG — SIGNIFICANT CHANGE UP (ref 27–34)
MCHC RBC-ENTMCNC: 33 GM/DL — SIGNIFICANT CHANGE UP (ref 32–36)
MCV RBC AUTO: 85.5 FL — SIGNIFICANT CHANGE UP (ref 80–100)
PHOSPHATE SERPL-MCNC: 4.6 MG/DL — HIGH (ref 2.5–4.5)
PLATELET # BLD AUTO: 403 K/UL — HIGH (ref 150–400)
POTASSIUM SERPL-MCNC: 3.7 MMOL/L — SIGNIFICANT CHANGE UP (ref 3.5–5.3)
POTASSIUM SERPL-SCNC: 3.7 MMOL/L — SIGNIFICANT CHANGE UP (ref 3.5–5.3)
RBC # BLD: 3.3 M/UL — LOW (ref 3.8–5.2)
RBC # FLD: 15.9 % — HIGH (ref 10.3–14.5)
SODIUM SERPL-SCNC: 134 MMOL/L — LOW (ref 135–145)
T4 FREE SERPL-MCNC: 1.1 NG/DL — SIGNIFICANT CHANGE UP (ref 0.9–1.8)
TSH SERPL-MCNC: 1.02 UIU/ML — SIGNIFICANT CHANGE UP (ref 0.27–4.2)
WBC # BLD: 9.2 K/UL — SIGNIFICANT CHANGE UP (ref 3.8–10.5)
WBC # FLD AUTO: 9.2 K/UL — SIGNIFICANT CHANGE UP (ref 3.8–10.5)

## 2019-08-26 PROCEDURE — 99231 SBSQ HOSP IP/OBS SF/LOW 25: CPT | Mod: GC

## 2019-08-26 PROCEDURE — 93010 ELECTROCARDIOGRAM REPORT: CPT

## 2019-08-26 RX ORDER — REPAGLINIDE 1 MG/1
1 TABLET ORAL ONCE
Refills: 0 | Status: COMPLETED | OUTPATIENT
Start: 2019-08-26 | End: 2019-08-26

## 2019-08-26 RX ORDER — INSULIN LISPRO 100/ML
50 VIAL (ML) SUBCUTANEOUS
Refills: 0 | Status: DISCONTINUED | OUTPATIENT
Start: 2019-08-26 | End: 2019-08-27

## 2019-08-26 RX ORDER — INSULIN LISPRO 100/ML
3 VIAL (ML) SUBCUTANEOUS ONCE
Refills: 0 | Status: COMPLETED | OUTPATIENT
Start: 2019-08-26 | End: 2019-08-26

## 2019-08-26 RX ORDER — SODIUM BICARBONATE 1 MEQ/ML
650 SYRINGE (ML) INTRAVENOUS
Refills: 0 | Status: DISCONTINUED | OUTPATIENT
Start: 2019-08-26 | End: 2019-09-05

## 2019-08-26 RX ORDER — IPRATROPIUM/ALBUTEROL SULFATE 18-103MCG
3 AEROSOL WITH ADAPTER (GRAM) INHALATION EVERY 6 HOURS
Refills: 0 | Status: DISCONTINUED | OUTPATIENT
Start: 2019-08-26 | End: 2019-09-05

## 2019-08-26 RX ADMIN — Medication 4: at 17:02

## 2019-08-26 RX ADMIN — Medication 50 MILLIGRAM(S): at 21:03

## 2019-08-26 RX ADMIN — LIDOCAINE 1 PATCH: 4 CREAM TOPICAL at 21:04

## 2019-08-26 RX ADMIN — Medication 81 MILLIGRAM(S): at 10:35

## 2019-08-26 RX ADMIN — Medication 50 MILLIGRAM(S): at 05:30

## 2019-08-26 RX ADMIN — Medication 75 UNIT(S): at 17:01

## 2019-08-26 RX ADMIN — ATORVASTATIN CALCIUM 40 MILLIGRAM(S): 80 TABLET, FILM COATED ORAL at 21:03

## 2019-08-26 RX ADMIN — TRAMADOL HYDROCHLORIDE 25 MILLIGRAM(S): 50 TABLET ORAL at 09:45

## 2019-08-26 RX ADMIN — Medication 100 MILLIGRAM(S): at 05:30

## 2019-08-26 RX ADMIN — Medication 650 MILLIGRAM(S): at 17:13

## 2019-08-26 RX ADMIN — ISOSORBIDE DINITRATE 30 MILLIGRAM(S): 5 TABLET ORAL at 21:02

## 2019-08-26 RX ADMIN — MONTELUKAST 10 MILLIGRAM(S): 4 TABLET, CHEWABLE ORAL at 10:36

## 2019-08-26 RX ADMIN — SPIRONOLACTONE 25 MILLIGRAM(S): 25 TABLET, FILM COATED ORAL at 05:29

## 2019-08-26 RX ADMIN — CHLORHEXIDINE GLUCONATE 1 APPLICATION(S): 213 SOLUTION TOPICAL at 09:04

## 2019-08-26 RX ADMIN — PANTOPRAZOLE SODIUM 40 MILLIGRAM(S): 20 TABLET, DELAYED RELEASE ORAL at 05:30

## 2019-08-26 RX ADMIN — Medication 650 MILLIGRAM(S): at 21:12

## 2019-08-26 RX ADMIN — TRAMADOL HYDROCHLORIDE 25 MILLIGRAM(S): 50 TABLET ORAL at 09:03

## 2019-08-26 RX ADMIN — Medication 0.25 MILLIGRAM(S): at 10:35

## 2019-08-26 RX ADMIN — HEPARIN SODIUM 5000 UNIT(S): 5000 INJECTION INTRAVENOUS; SUBCUTANEOUS at 05:30

## 2019-08-26 RX ADMIN — SENNA PLUS 1 TABLET(S): 8.6 TABLET ORAL at 21:04

## 2019-08-26 RX ADMIN — Medication 46 UNIT(S): at 17:03

## 2019-08-26 RX ADMIN — Medication 0.6 MILLIGRAM(S): at 10:36

## 2019-08-26 RX ADMIN — Medication 46 UNIT(S): at 12:10

## 2019-08-26 RX ADMIN — Medication 3 UNIT(S): at 00:29

## 2019-08-26 RX ADMIN — Medication 3 MILLIGRAM(S): at 21:03

## 2019-08-26 RX ADMIN — Medication 2: at 07:53

## 2019-08-26 RX ADMIN — HEPARIN SODIUM 5000 UNIT(S): 5000 INJECTION INTRAVENOUS; SUBCUTANEOUS at 17:00

## 2019-08-26 RX ADMIN — Medication 650 MILLIGRAM(S): at 22:29

## 2019-08-26 RX ADMIN — LIDOCAINE 1 PATCH: 4 CREAM TOPICAL at 08:59

## 2019-08-26 RX ADMIN — POLYETHYLENE GLYCOL 3350 17 GRAM(S): 17 POWDER, FOR SOLUTION ORAL at 10:36

## 2019-08-26 RX ADMIN — Medication 50 MICROGRAM(S): at 05:30

## 2019-08-26 RX ADMIN — Medication 25 MILLIGRAM(S): at 05:29

## 2019-08-26 RX ADMIN — Medication 50 MILLIGRAM(S): at 13:51

## 2019-08-26 RX ADMIN — Medication 100 MILLIGRAM(S): at 17:00

## 2019-08-26 RX ADMIN — Medication 3 MILLILITER(S): at 18:00

## 2019-08-26 RX ADMIN — ISOSORBIDE DINITRATE 30 MILLIGRAM(S): 5 TABLET ORAL at 05:29

## 2019-08-26 RX ADMIN — BUMETANIDE 3 MILLIGRAM(S): 0.25 INJECTION INTRAMUSCULAR; INTRAVENOUS at 17:00

## 2019-08-26 RX ADMIN — Medication 2: at 12:10

## 2019-08-26 RX ADMIN — REPAGLINIDE 1 MILLIGRAM(S): 1 TABLET ORAL at 14:38

## 2019-08-26 RX ADMIN — LIDOCAINE 1 PATCH: 4 CREAM TOPICAL at 08:00

## 2019-08-26 RX ADMIN — Medication 46 UNIT(S): at 07:53

## 2019-08-26 RX ADMIN — Medication 1 TABLET(S): at 10:35

## 2019-08-26 RX ADMIN — Medication 0.25 MILLIGRAM(S): at 21:13

## 2019-08-26 RX ADMIN — CLOPIDOGREL BISULFATE 75 MILLIGRAM(S): 75 TABLET, FILM COATED ORAL at 10:35

## 2019-08-26 RX ADMIN — ISOSORBIDE DINITRATE 30 MILLIGRAM(S): 5 TABLET ORAL at 13:51

## 2019-08-26 RX ADMIN — BUMETANIDE 3 MILLIGRAM(S): 0.25 INJECTION INTRAMUSCULAR; INTRAVENOUS at 05:30

## 2019-08-26 RX ADMIN — Medication 85 UNIT(S): at 07:55

## 2019-08-26 NOTE — PROGRESS NOTE ADULT - ASSESSMENT
Pt is a 72 yo woman with PMHx of HTN, T2DM, CAD s/p CABG (LIMA to LAD, SVG to OM, SVG to PDA 2014 at [facility]), non-dilated ICM (EF 20-25%), severe mitral regurgitation s/p mitral clip (6/13/19 Dr. Newman), severe pulm HTN, CKD, hypothyroidism admitted with worsening SOB.    A/P:  MERVIN  Likely due to cardiogenic shock  Renal function remains stable today  subject to fluctuations based on Renal Perfusion.  Pt currently on Bumex 3 mg PO Q12 and spironolactone 25mg daily  - Scr currently stable   - Continue diuretic therapy per cardiology.  - Optimize glucose control  - Monitor renal function   - Avoid further nephrotoxics, NSAIDS, RCA    Hyponatremia  in the setting of hyperglycemia.   Currently stable. Monitor   Optimize glucose control    CKD stage 4:  baseline Scr 1.8-2.0. Scr stable today.   Renal function fluctuates sec to CHF  Monitor renal function at present    SOB:  in setting of HF. Improved on diuretic therapy.   Continue diuretics per cardiology    Acidosis   Sec to Renal failure  Will start sodium bicarb 650mg BID today   Monitor serum Co2     Hypokalemia:  in the setting of diuretic therapy.   Serum potassium stable      Anemia:   Hb stable  Pt with iron deficiency anemia.

## 2019-08-26 NOTE — PROGRESS NOTE ADULT - ASSESSMENT
72yo F admitted for management of heart failure decompensation, HTN, DM and CKD who developed left ankle and hand pain and swelling around 7 days ago, on exam with tenderness to palpation over the left ankle but no erythema and minimal edema. Risk factors for gout including age, elevated uric acid, diuretic therapy and prior episodes treated with prednisone in the same joint.    Oligoarticular Gout/elevated uric acid/prior episodes/CKD/diuretic therapy: all these factor increase the risk for gout attacks  -Significant improvement with colchicine therapy  -Stop/discontinue Daily colchicine.  -Start colchicine 0.6 mg 3 times a week.   The patient will benefit of starting renally dose uric acid lowering therapy after discharge in 4-5 weeks.    Will sign off, reconsult as needed    Steffen Lanier  Rheum Fellow I  D/w Dr. Lovett 74yo F admitted for management of heart failure decompensation, HTN, DM and CKD who developed left ankle and hand pain and swelling around 7 days ago, on exam with tenderness to palpation over the left ankle but no erythema and minimal edema. Risk factors for gout including age, elevated uric acid, diuretic therapy and prior episodes treated with prednisone in the same joint.    Oligoarticular Gout/elevated uric acid/prior episodes/CKD/diuretic therapy: all these factor increase the risk for gout attacks  -Significant improvement with colchicine therapy  -Stop/discontinue Daily colchicine.  -Start colchicine 0.6 mg 3 times a week.   The patient will benefit of starting renally dose uric acid lowering therapy after discharge in 4-5 weeks.    Will sign off, reconsult as needed.    Steffen Lanier  Rheum Fellow I  D/w Dr. Lovett

## 2019-08-26 NOTE — CHART NOTE - NSCHARTNOTEFT_GEN_A_CORE
Called by RN to report Pt had a run of PAT with HR up to 140s for 7 seconds on tele. Seen and examined at the bedside. Patient is asymptomatic. EKG showed sinus bert with HR 59 bpm. NSR on tele now. will continue to monitor.     Vital Signs Last 24 Hrs  T(C): 36.6 (26 Aug 2019 04:33), Max: 36.6 (26 Aug 2019 04:33)  T(F): 97.8 (26 Aug 2019 04:33), Max: 97.8 (26 Aug 2019 04:33)  HR: 83 (26 Aug 2019 04:33) (83 - 92)  BP: 114/65 (26 Aug 2019 04:33) (106/65 - 117/69)  BP(mean): --  RR: 18 (26 Aug 2019 04:33) (18 - 18)  SpO2: 100% (26 Aug 2019 04:33) (100% - 100%)                          9.5    8.7   )-----------( 395      ( 25 Aug 2019 06:30 )             29.1       08-25    134<L>  |  97  |  101<H>  ----------------------------<  255<H>  4.1   |  18<L>  |  2.19<H>    Ca    9.3      25 Aug 2019 06:30

## 2019-08-26 NOTE — PROGRESS NOTE ADULT - SUBJECTIVE AND OBJECTIVE BOX
Chief complaint  Patient is a 73y old  Female who presents with a chief complaint of Hypoxia, SOB (26 Aug 2019 15:20)   Review of systems  Patient in bed, looks comfortable, no fever, no hypoglycemia.    Labs and Fingersticks  CAPILLARY BLOOD GLUCOSE      POCT Blood Glucose.: 268 mg/dL (26 Aug 2019 14:34)  POCT Blood Glucose.: 249 mg/dL (26 Aug 2019 11:50)  POCT Blood Glucose.: 224 mg/dL (26 Aug 2019 07:39)  POCT Blood Glucose.: 207 mg/dL (26 Aug 2019 06:25)  POCT Blood Glucose.: 318 mg/dL (26 Aug 2019 00:12)  POCT Blood Glucose.: 378 mg/dL (25 Aug 2019 21:27)  POCT Blood Glucose.: 370 mg/dL (25 Aug 2019 16:20)      Anion Gap, Serum: 18 <H> (08-26 @ 05:58)  Anion Gap, Serum: 19 <H> (08-25 @ 06:30)      Calcium, Total Serum: 9.0 (08-26 @ 05:58)  Calcium, Total Serum: 9.3 (08-25 @ 06:30)          08-26    134<L>  |  100  |  104<H>  ----------------------------<  203<H>  3.7   |  16<L>  |  2.14<H>    Ca    9.0      26 Aug 2019 05:58  Phos  4.6     08-26  Mg     2.4     08-26                          9.3    9.2   )-----------( 403      ( 26 Aug 2019 05:58 )             28.2     Medications  MEDICATIONS  (STANDING):  acetaminophen  IVPB .. 1000 milliGRAM(s) IV Intermittent once  aspirin enteric coated 81 milliGRAM(s) Oral daily  atorvastatin 40 milliGRAM(s) Oral at bedtime  buMETAnide 3 milliGRAM(s) Oral every 12 hours  chlorhexidine 2% Cloths 1 Application(s) Topical daily  clopidogrel Tablet 75 milliGRAM(s) Oral daily  colchicine 0.6 milliGRAM(s) Oral daily  dextrose 5%. 1000 milliLiter(s) (50 mL/Hr) IV Continuous <Continuous>  dextrose 50% Injectable 12.5 Gram(s) IV Push once  dextrose 50% Injectable 25 Gram(s) IV Push once  dextrose 50% Injectable 25 Gram(s) IV Push once  docusate sodium 100 milliGRAM(s) Oral two times a day  heparin  Injectable 5000 Unit(s) SubCutaneous every 12 hours  hydrALAZINE 50 milliGRAM(s) Oral every 8 hours  insulin lispro (HumaLOG) corrective regimen sliding scale   SubCutaneous three times a day before meals  insulin lispro (HumaLOG) corrective regimen sliding scale   SubCutaneous at bedtime  insulin lispro Injectable (HumaLOG) 46 Unit(s) SubCutaneous three times a day with meals  insulin NPH/regular 70/30 Injectable 85 Unit(s) SubCutaneous before breakfast  insulin NPH/regular 70/30 Injectable 75 Unit(s) SubCutaneous before dinner  isosorbide   dinitrate Tablet (ISORDIL) 30 milliGRAM(s) Oral three times a day  lactated ringers. 1000 milliLiter(s) (75 mL/Hr) IV Continuous <Continuous>  levothyroxine 50 MICROGram(s) Oral daily  lidocaine   Patch 1 Patch Transdermal daily  melatonin 3 milliGRAM(s) Oral at bedtime  metoprolol succinate ER 25 milliGRAM(s) Oral daily  montelukast 10 milliGRAM(s) Oral daily  Nephro-enrico 1 Tablet(s) Oral daily  pantoprazole    Tablet 40 milliGRAM(s) Oral before breakfast  polyethylene glycol 3350 17 Gram(s) Oral daily  senna 2 Tablet(s) Oral at bedtime  sodium bicarbonate 650 milliGRAM(s) Oral two times a day  spironolactone 25 milliGRAM(s) Oral daily      Physical Exam  General: Patient comfortable in bed  Vital Signs Last 12 Hrs  T(F): 98.1 (08-26-19 @ 11:44), Max: 98.1 (08-26-19 @ 11:44)  HR: 79 (08-26-19 @ 13:50) (79 - 89)  BP: 110/64 (08-26-19 @ 13:50) (102/61 - 118/68)  BP(mean): --  RR: 18 (08-26-19 @ 11:44) (18 - 20)  SpO2: 100% (08-26-19 @ 11:44) (100% - 100%)  Neck: No palpable thyroid nodules.  CVS: S1S2, No murmurs  Respiratory: No wheezing, no crepitations  GI: Abdomen soft, bowel sounds positive  Musculoskeletal:  edema lower extremities.   Skin: No skin rashes, no ecchymosis    Diagnostics    Cortisol AM, Serum: AM Sched. Collection: 18-Aug-2019 06:00 (08-17 @ 12:14)  Cortisol AM, Serum: AM Sched. Collection: 19-Aug-2019 06:00 (08-18 @ 12:59)  Thyroid Stimulating Hormone, Serum: AM Sched. Collection: 26-Aug-2019 06:00 (08-25 @ 12:31)  Free Thyroxine, Serum: AM Sched. Collection: 26-Aug-2019 06:00 (08-25 @ 12:31)

## 2019-08-26 NOTE — PROGRESS NOTE ADULT - SUBJECTIVE AND OBJECTIVE BOX
CHIEF COMPLAINT:    SUBJECTIVE:     REVIEW OF SYSTEMS:    CONSTITUTIONAL: (  )  weakness,  (  ) fevers or chills  EYES/ENT: (  )visual changes;     NECK: (  ) pain or stiffness  RESPIRATORY:   (  )cough, wheezing, hemoptysis;  (  ) shortness of breath  CARDIOVASCULAR:  (  )chest pain or palpitations  GASTROINTESTINAL:   (  )abdominal or epigastric pain.  (  ) nausea, vomiting, or hematemesis;   (   ) diarrhea or constipation.   GENITOURINARY:   (    ) dysuria, frequency or hematuria  NEUROLOGICAL:  (   ) numbness or weakness   All other review of systems is negative unless indicated above    Vital Signs Last 24 Hrs  T(C): 36.7 (26 Aug 2019 11:44), Max: 36.7 (26 Aug 2019 11:44)  T(F): 98.1 (26 Aug 2019 11:44), Max: 98.1 (26 Aug 2019 11:44)  HR: 79 (26 Aug 2019 13:50) (79 - 92)  BP: 116/68 (26 Aug 2019 16:58) (102/61 - 118/68)  BP(mean): --  RR: 18 (26 Aug 2019 11:44) (18 - 20)  SpO2: 100% (26 Aug 2019 11:44) (100% - 100%)    I&O's Summary    25 Aug 2019 07:01  -  26 Aug 2019 07:00  --------------------------------------------------------  IN: 965 mL / OUT: 1000 mL / NET: -35 mL    26 Aug 2019 07:01  -  26 Aug 2019 17:27  --------------------------------------------------------  IN: 480 mL / OUT: 600 mL / NET: -120 mL        CAPILLARY BLOOD GLUCOSE      POCT Blood Glucose.: 304 mg/dL (26 Aug 2019 16:22)  POCT Blood Glucose.: 268 mg/dL (26 Aug 2019 14:34)  POCT Blood Glucose.: 249 mg/dL (26 Aug 2019 11:50)  POCT Blood Glucose.: 224 mg/dL (26 Aug 2019 07:39)  POCT Blood Glucose.: 207 mg/dL (26 Aug 2019 06:25)  POCT Blood Glucose.: 318 mg/dL (26 Aug 2019 00:12)  POCT Blood Glucose.: 378 mg/dL (25 Aug 2019 21:27)      PHYSICAL EXAM:    Constitutional:  (   ) NAD,   (   )awake and alert  HEENT: PERR, EOMI,    Neck: Soft and supple, No LAD, No JVD  Respiratory:  (    Breath sounds are clear bilaterally,    (   ) wheezing, rales or rhonchi  Cardiovascular:     (   )S1 and S2, regular rate and rhythm, no Murmurs, gallops or rubs  Gastrointestinal:  (   )Bowel Sounds present, soft,   (  )nontender, nondistended,    Extremities:    (  ) peripheral edema  Vascular: 2+ peripheral pulses  Neurological:    (    )A/O x 3,   (  ) focal deficits  Musculoskeletal:    (   )  normal strength b/l upper  (     ) normal  lower extremities  Skin: No rashes    MEDICATIONS:  MEDICATIONS  (STANDING):  acetaminophen  IVPB .. 1000 milliGRAM(s) IV Intermittent once  aspirin enteric coated 81 milliGRAM(s) Oral daily  atorvastatin 40 milliGRAM(s) Oral at bedtime  buMETAnide 3 milliGRAM(s) Oral every 12 hours  chlorhexidine 2% Cloths 1 Application(s) Topical daily  clopidogrel Tablet 75 milliGRAM(s) Oral daily  colchicine 0.6 milliGRAM(s) Oral daily  dextrose 5%. 1000 milliLiter(s) (50 mL/Hr) IV Continuous <Continuous>  dextrose 50% Injectable 12.5 Gram(s) IV Push once  dextrose 50% Injectable 25 Gram(s) IV Push once  dextrose 50% Injectable 25 Gram(s) IV Push once  docusate sodium 100 milliGRAM(s) Oral two times a day  heparin  Injectable 5000 Unit(s) SubCutaneous every 12 hours  hydrALAZINE 50 milliGRAM(s) Oral every 8 hours  insulin lispro (HumaLOG) corrective regimen sliding scale   SubCutaneous three times a day before meals  insulin lispro (HumaLOG) corrective regimen sliding scale   SubCutaneous at bedtime  insulin lispro Injectable (HumaLOG) 46 Unit(s) SubCutaneous three times a day with meals  insulin NPH/regular 70/30 Injectable 85 Unit(s) SubCutaneous before breakfast  insulin NPH/regular 70/30 Injectable 75 Unit(s) SubCutaneous before dinner  isosorbide   dinitrate Tablet (ISORDIL) 30 milliGRAM(s) Oral three times a day  lactated ringers. 1000 milliLiter(s) (75 mL/Hr) IV Continuous <Continuous>  levothyroxine 50 MICROGram(s) Oral daily  lidocaine   Patch 1 Patch Transdermal daily  melatonin 3 milliGRAM(s) Oral at bedtime  metoprolol succinate ER 25 milliGRAM(s) Oral daily  montelukast 10 milliGRAM(s) Oral daily  Nephro-enrico 1 Tablet(s) Oral daily  pantoprazole    Tablet 40 milliGRAM(s) Oral before breakfast  polyethylene glycol 3350 17 Gram(s) Oral daily  senna 2 Tablet(s) Oral at bedtime  sodium bicarbonate 650 milliGRAM(s) Oral two times a day  spironolactone 25 milliGRAM(s) Oral daily      LABS: All Labs Reviewed:                        9.3    9.2   )-----------( 403      ( 26 Aug 2019 05:58 )             28.2     08-26    134<L>  |  100  |  104<H>  ----------------------------<  203<H>  3.7   |  16<L>  |  2.14<H>    Ca    9.0      26 Aug 2019 05:58  Phos  4.6     08-26  Mg     2.4     08-26            Blood Culture: 08-22 @ 14:50  Organism --  Gram Stain Blood -- Gram Stain --  Specimen Source .Blood  Culture-Blood --      Urine Culture      RADIOLOGY/EKG:    ASSESSMENT AND PLAN:    DVT PPX:    ADVANCED DIRECTIVE:    DISPOSITION: CHIEF COMPLAINT: patient had one episode of this will be which required extra nebulizer  But  significant improvement on L-Ankle and right hand pain. after colchicine therapy she reports that the pain has improved    SUBJECTIVE:     REVIEW OF SYSTEMS:    CONSTITUTIONAL: ( x )  weakness,  (  ) fevers or chills  EYES/ENT: (  )visual changes;     NECK: (  ) pain or stiffness  RESPIRATORY:   (  )cough, wheezing, hemoptysis;  (  ) shortness of breath  CARDIOVASCULAR:  (  )chest pain or palpitations  GASTROINTESTINAL:   (  )abdominal or epigastric pain.  (  ) nausea, vomiting, or hematemesis;   (   ) diarrhea or constipation.   GENITOURINARY:   (    ) dysuria, frequency or hematuria  NEUROLOGICAL:  (   ) numbness or weakness   All other review of systems is negative unless indicated above    Vital Signs Last 24 Hrs  T(C): 36.7 (26 Aug 2019 11:44), Max: 36.7 (26 Aug 2019 11:44)  T(F): 98.1 (26 Aug 2019 11:44), Max: 98.1 (26 Aug 2019 11:44)  HR: 79 (26 Aug 2019 13:50) (79 - 92)  BP: 116/68 (26 Aug 2019 16:58) (102/61 - 118/68)  BP(mean): --  RR: 18 (26 Aug 2019 11:44) (18 - 20)  SpO2: 100% (26 Aug 2019 11:44) (100% - 100%)    I&O's Summary    25 Aug 2019 07:01  -  26 Aug 2019 07:00  --------------------------------------------------------  IN: 965 mL / OUT: 1000 mL / NET: -35 mL    26 Aug 2019 07:01  -  26 Aug 2019 17:27  --------------------------------------------------------  IN: 480 mL / OUT: 600 mL / NET: -120 mL        CAPILLARY BLOOD GLUCOSE      POCT Blood Glucose.: 304 mg/dL (26 Aug 2019 16:22)  POCT Blood Glucose.: 268 mg/dL (26 Aug 2019 14:34)  POCT Blood Glucose.: 249 mg/dL (26 Aug 2019 11:50)  POCT Blood Glucose.: 224 mg/dL (26 Aug 2019 07:39)  POCT Blood Glucose.: 207 mg/dL (26 Aug 2019 06:25)  POCT Blood Glucose.: 318 mg/dL (26 Aug 2019 00:12)  POCT Blood Glucose.: 378 mg/dL (25 Aug 2019 21:27)      PHYSICAL EXAM:   General: NAD  HEENT: EOMI, MMM  Cardio: normal S1-S2  , soft holosystolic murmur left sternal border  Resp: CTA b/l  GI: +BS, soft, NT/ND  MSK: minimal tenderness to palpation over the left ankle  Neuro: AAOx      MEDICATIONS:  MEDICATIONS  (STANDING):  acetaminophen  IVPB .. 1000 milliGRAM(s) IV Intermittent once  aspirin enteric coated 81 milliGRAM(s) Oral daily  atorvastatin 40 milliGRAM(s) Oral at bedtime  buMETAnide 3 milliGRAM(s) Oral every 12 hours  chlorhexidine 2% Cloths 1 Application(s) Topical daily  clopidogrel Tablet 75 milliGRAM(s) Oral daily  colchicine 0.6 milliGRAM(s) Oral daily  dextrose 5%. 1000 milliLiter(s) (50 mL/Hr) IV Continuous <Continuous>  dextrose 50% Injectable 12.5 Gram(s) IV Push once  dextrose 50% Injectable 25 Gram(s) IV Push once  dextrose 50% Injectable 25 Gram(s) IV Push once  docusate sodium 100 milliGRAM(s) Oral two times a day  heparin  Injectable 5000 Unit(s) SubCutaneous every 12 hours  hydrALAZINE 50 milliGRAM(s) Oral every 8 hours  insulin lispro (HumaLOG) corrective regimen sliding scale   SubCutaneous three times a day before meals  insulin lispro (HumaLOG) corrective regimen sliding scale   SubCutaneous at bedtime  insulin lispro Injectable (HumaLOG) 46 Unit(s) SubCutaneous three times a day with meals  insulin NPH/regular 70/30 Injectable 85 Unit(s) SubCutaneous before breakfast  insulin NPH/regular 70/30 Injectable 75 Unit(s) SubCutaneous before dinner  isosorbide   dinitrate Tablet (ISORDIL) 30 milliGRAM(s) Oral three times a day  lactated ringers. 1000 milliLiter(s) (75 mL/Hr) IV Continuous <Continuous>  levothyroxine 50 MICROGram(s) Oral daily  lidocaine   Patch 1 Patch Transdermal daily  melatonin 3 milliGRAM(s) Oral at bedtime  metoprolol succinate ER 25 milliGRAM(s) Oral daily  montelukast 10 milliGRAM(s) Oral daily  Nephro-enrico 1 Tablet(s) Oral daily  pantoprazole    Tablet 40 milliGRAM(s) Oral before breakfast  polyethylene glycol 3350 17 Gram(s) Oral daily  senna 2 Tablet(s) Oral at bedtime  sodium bicarbonate 650 milliGRAM(s) Oral two times a day  spironolactone 25 milliGRAM(s) Oral daily      LABS: All Labs Reviewed:                        9.3    9.2   )-----------( 403      ( 26 Aug 2019 05:58 )             28.2     08-26    134<L>  |  100  |  104<H>  ----------------------------<  203<H>  3.7   |  16<L>  |  2.14<H>    Ca    9.0      26 Aug 2019 05:58  Phos  4.6     08-26  Mg     2.4     08-26            Blood Culture: 08-22 @ 14:50  Organism --  Gram Stain Blood -- Gram Stain --  Specimen Source .Blood  Culture-Blood --      Urine Culture      RADIOLOGY/EKG:    ASSESSMENT AND PLAN:  72yo F admitted for management of heart failure decompensation, HTN, DM and CKD who developed left ankle and hand pain and swelling around 7 days ago, on exam with tenderness to palpation over the left ankle but no erythema and minimal edema. Risk factors for gout including age, elevated uric acid, diuretic therapy and prior episodes treated with prednisone in the same joint.    # Oligoarticular Gout/elevated uric acid/prior episodes/CKD/diuretic therapy: all these factor increase the risk for gout attacks  -Significant improvement with colchicine therapy  -Stop/discontinue Daily colchicine.  -Start colchicine 0.6 mg 3 times a week.   The patient will benefit of starting renally dose uric acid lowering therapy after discharge in 4-5 weeks.  #CHF/CAD continue above management  #DM type II is still in her blood sugar is elevated  ENDO consult and follow appreciated patient received 1 dose of Prandin continue      insulin lispro Injectable (HumaLOG) 46 Unit(s) SubCutaneous three times a day with meals  insulin NPH/regular 70/30 Injectable 85 Unit(s) SubCutaneous before breakfast  insulin NPH/regular 70/30 Injectable 75 Unit(s) SubCutaneous before dinner    DVT PPX:    ADVANCED DIRECTIVE:    DISPOSITION:

## 2019-08-26 NOTE — PROGRESS NOTE ADULT - SUBJECTIVE AND OBJECTIVE BOX
[facility]   MD GONZALEZ SHAH D.O, PA    TELEPHONE NUMBERS:  OFFICE: [phone]  DR. SANTOYO CELL: [phone]  JUSTEN FIELD CELL: [phone]  DR. RIDER CELL:  [phone]    RENAL FOLLOW UP NOTE  --------------------------------------------------------------------------------  HPI: Pt seen and examined at bedside. Pt doing PT at bedside. Tolerating activity   Denies SOB, chest pain     PAST HISTORY  --------------------------------------------------------------------------------  No significant changes to PMH, PSH, FHx, SHx, unless otherwise noted    ALLERGIES & MEDICATIONS  --------------------------------------------------------------------------------  Allergies    azithromycin (Hives; Pruritus)    Intolerances      Standing Inpatient Medications  acetaminophen  IVPB .. 1000 milliGRAM(s) IV Intermittent once  aspirin enteric coated 81 milliGRAM(s) Oral daily  atorvastatin 40 milliGRAM(s) Oral at bedtime  buMETAnide 3 milliGRAM(s) Oral every 12 hours  chlorhexidine 2% Cloths 1 Application(s) Topical daily  clopidogrel Tablet 75 milliGRAM(s) Oral daily  colchicine 0.6 milliGRAM(s) Oral daily  dextrose 5%. 1000 milliLiter(s) IV Continuous <Continuous>  dextrose 50% Injectable 12.5 Gram(s) IV Push once  dextrose 50% Injectable 25 Gram(s) IV Push once  dextrose 50% Injectable 25 Gram(s) IV Push once  docusate sodium 100 milliGRAM(s) Oral two times a day  heparin  Injectable 5000 Unit(s) SubCutaneous every 12 hours  hydrALAZINE 50 milliGRAM(s) Oral every 8 hours  insulin lispro (HumaLOG) corrective regimen sliding scale   SubCutaneous three times a day before meals  insulin lispro (HumaLOG) corrective regimen sliding scale   SubCutaneous at bedtime  insulin lispro Injectable (HumaLOG) 46 Unit(s) SubCutaneous three times a day with meals  insulin NPH/regular 70/30 Injectable 85 Unit(s) SubCutaneous before breakfast  insulin NPH/regular 70/30 Injectable 75 Unit(s) SubCutaneous before dinner  isosorbide   dinitrate Tablet (ISORDIL) 30 milliGRAM(s) Oral three times a day  lactated ringers. 1000 milliLiter(s) IV Continuous <Continuous>  levothyroxine 50 MICROGram(s) Oral daily  lidocaine   Patch 1 Patch Transdermal daily  melatonin 3 milliGRAM(s) Oral at bedtime  metoprolol succinate ER 25 milliGRAM(s) Oral daily  montelukast 10 milliGRAM(s) Oral daily  Nephro-enrico 1 Tablet(s) Oral daily  pantoprazole    Tablet 40 milliGRAM(s) Oral before breakfast  polyethylene glycol 3350 17 Gram(s) Oral daily  senna 2 Tablet(s) Oral at bedtime  spironolactone 25 milliGRAM(s) Oral daily    PRN Inpatient Medications  acetaminophen   Tablet .. 650 milliGRAM(s) Oral every 6 hours PRN  ALPRAZolam 0.25 milliGRAM(s) Oral three times a day PRN  dextrose 40% Gel 15 Gram(s) Oral once PRN  glucagon  Injectable 1 milliGRAM(s) IntraMuscular once PRN  traMADol 25 milliGRAM(s) Oral every 6 hours PRN      REVIEW OF SYSTEMS  --------------------------------------------------------------------------------  General: no fever  CVS: no chest pain  RESP: no sob, no cough  ABD: no abdominal pain  : no dysuria  MSK: no edema     VITALS/PHYSICAL EXAM  --------------------------------------------------------------------------------  T(C): 36.7 (08-26-19 @ 11:44), Max: 36.7 (08-26-19 @ 11:44)  HR: 79 (08-26-19 @ 13:50) (79 - 92)  BP: 110/64 (08-26-19 @ 13:50) (102/61 - 118/68)  RR: 18 (08-26-19 @ 11:44) (18 - 20)  SpO2: 100% (08-26-19 @ 11:44) (100% - 100%)  Wt(kg): --      08-25-19 @ 07:01  -  08-26-19 @ 07:00  --------------------------------------------------------  IN: 965 mL / OUT: 1000 mL / NET: -35 mL    08-26-19 @ 07:01  -  08-26-19 @ 15:20  --------------------------------------------------------  IN: 480 mL / OUT: 400 mL / NET: 80 mL      Physical Exam:  	Gen: NAD  	HEENT: MMM  	Pulm: CTA B/L  	CV: S1S2  	Abd: Soft, +BS  	Ext: No LE edema B/L                      Neuro: Awake   	Skin: Warm and Dry     LABS/STUDIES  --------------------------------------------------------------------------------              9.3    9.2   >-----------<  403      [08-26-19 @ 05:58]              28.2     134  |  100  |  104  ----------------------------<  203      [08-26-19 @ 05:58]  3.7   |  16  |  2.14        Ca     9.0     [08-26-19 @ 05:58]      Mg     2.4     [08-26-19 @ 05:58]      Phos  4.6     [08-26-19 @ 05:58]      Creatinine Trend:  SCr 2.14 [08-26 @ 05:58]  SCr 2.19 [08-25 @ 06:30]  SCr 1.94 [08-24 @ 06:35]  SCr 2.10 [08-23 @ 06:47]  SCr 2.04 [08-22 @ 11:47]    Urinalysis - [08-22-19 @ 16:43]      Color Light Yellow / Appearance Clear / SG 1.020 / pH 6.0      Gluc >=1000 mg/dL / Ketone Negative  / Bili Negative / Urobili <2 mg/dL       Blood Negative / Protein Negative / Leuk Est Negative / Nitrite Negative      RBC  / WBC  / Hyaline  / Gran  / Sq Epi  / Non Sq Epi  / Bacteria       Iron 42, TIBC 348, %sat 12      [07-05-19 @ 22:32]  Ferritin 130      [07-05-19 @ 22:32]  .4 (Ca --)      [04-25-19 @ 05:45]   --  PTH 43.55 (Ca --)      [09-10-18 @ 07:15]   --  PTH 36.60 (Ca --)      [09-08-18 @ 03:15]   --  Vitamin D (25OH) 25.9      [05-01-19 @ 04:00]  HbA1c 7.4      [07-05-19 @ 22:32]  TSH 2.00      [07-05-19 @ 22:32]  Lipid: chol 148, , HDL 35,       [06-01-19 @ 08:00]

## 2019-08-26 NOTE — PROGRESS NOTE ADULT - SUBJECTIVE AND OBJECTIVE BOX
SELVIN CLAIRE  [id number]    INTERVAL HPI/OVERNIGHT EVENTS:    today with significant improvement on L-Ankle and right hand pain. after colchicine therapy she reports that the pain has improved    MEDICATIONS  (STANDING):  acetaminophen  IVPB .. 1000 milliGRAM(s) IV Intermittent once  aspirin enteric coated 81 milliGRAM(s) Oral daily  atorvastatin 40 milliGRAM(s) Oral at bedtime  buMETAnide 3 milliGRAM(s) Oral every 12 hours  clopidogrel Tablet 75 milliGRAM(s) Oral daily  colchicine 0.6 milliGRAM(s) Oral daily  heparin  Injectable 5000 Unit(s) SubCutaneous every 12 hours  hydrALAZINE 50 milliGRAM(s) Oral every 8 hours  insulin lispro (HumaLOG) corrective regimen sliding scale   SubCutaneous three times a day before meals  insulin lispro (HumaLOG) corrective regimen sliding scale   SubCutaneous at bedtime  insulin lispro Injectable (HumaLOG) 46 Unit(s) SubCutaneous three times a day with meals  insulin NPH/regular 70/30 Injectable 85 Unit(s) SubCutaneous before breakfast  insulin NPH/regular 70/30 Injectable 75 Unit(s) SubCutaneous before dinner  isosorbide   dinitrate Tablet (ISORDIL) 30 milliGRAM(s) Oral three times a day  lactated ringers. 1000 milliLiter(s) (75 mL/Hr) IV Continuous <Continuous>  levothyroxine 50 MICROGram(s) Oral daily  lidocaine   Patch 1 Patch Transdermal daily  melatonin 3 milliGRAM(s) Oral at bedtime  metoprolol succinate ER 25 milliGRAM(s) Oral daily  montelukast 10 milliGRAM(s) Oral daily  Nephro-enrico 1 Tablet(s) Oral daily  pantoprazole    Tablet 40 milliGRAM(s) Oral before breakfast  polyethylene glycol 3350 17 Gram(s) Oral daily  senna 2 Tablet(s) Oral at bedtime  sodium bicarbonate 650 milliGRAM(s) Oral two times a day  spironolactone 25 milliGRAM(s) Oral daily        Vital Signs Last 24 Hrs  T(C): 36.7 (26 Aug 2019 11:44), Max: 36.7 (26 Aug 2019 11:44)  T(F): 98.1 (26 Aug 2019 11:44), Max: 98.1 (26 Aug 2019 11:44)  HR: 79 (26 Aug 2019 13:50) (79 - 92)  BP: 116/68 (26 Aug 2019 16:58) (102/61 - 118/68)  BP(mean): --  RR: 18 (26 Aug 2019 11:44) (18 - 20)  SpO2: 100% (26 Aug 2019 11:44) (100% - 100%)    Physical Exam:  General: NAD  HEENT: EOMI, MMM  Cardio: distant heart sound, soft holosystolic murmur left sternal border  Resp: CTA b/l  GI: +BS, soft, NT/ND  MSK: minimal tenderness to palpation over the left ankle  Neuro: AAOx    LABS:                        9.3    9.2   )-----------( 403      ( 26 Aug 2019 05:58 )             28.2     08-26    134<L>  |  100  |  104<H>  ----------------------------<  203<H>  3.7   |  16<L>  |  2.14<H>    Ca    9.0      26 Aug 2019 05:58  Phos  4.6     08-26  Mg     2.4     08-26              RADIOLOGY & ADDITIONAL TESTS: SELVIN CLAIRE  [id number]    INTERVAL HPI/OVERNIGHT EVENTS:    Today with significant improvement on L-Ankle and right hand pain. After colchicine therapy she reports that the pain has improved.    MEDICATIONS  (STANDING):  acetaminophen  IVPB .. 1000 milliGRAM(s) IV Intermittent once  aspirin enteric coated 81 milliGRAM(s) Oral daily  atorvastatin 40 milliGRAM(s) Oral at bedtime  buMETAnide 3 milliGRAM(s) Oral every 12 hours  clopidogrel Tablet 75 milliGRAM(s) Oral daily  colchicine 0.6 milliGRAM(s) Oral daily  heparin  Injectable 5000 Unit(s) SubCutaneous every 12 hours  hydrALAZINE 50 milliGRAM(s) Oral every 8 hours  insulin lispro (HumaLOG) corrective regimen sliding scale   SubCutaneous three times a day before meals  insulin lispro (HumaLOG) corrective regimen sliding scale   SubCutaneous at bedtime  insulin lispro Injectable (HumaLOG) 46 Unit(s) SubCutaneous three times a day with meals  insulin NPH/regular 70/30 Injectable 85 Unit(s) SubCutaneous before breakfast  insulin NPH/regular 70/30 Injectable 75 Unit(s) SubCutaneous before dinner  isosorbide   dinitrate Tablet (ISORDIL) 30 milliGRAM(s) Oral three times a day  lactated ringers. 1000 milliLiter(s) (75 mL/Hr) IV Continuous <Continuous>  levothyroxine 50 MICROGram(s) Oral daily  lidocaine   Patch 1 Patch Transdermal daily  melatonin 3 milliGRAM(s) Oral at bedtime  metoprolol succinate ER 25 milliGRAM(s) Oral daily  montelukast 10 milliGRAM(s) Oral daily  Nephro-enrico 1 Tablet(s) Oral daily  pantoprazole    Tablet 40 milliGRAM(s) Oral before breakfast  polyethylene glycol 3350 17 Gram(s) Oral daily  senna 2 Tablet(s) Oral at bedtime  sodium bicarbonate 650 milliGRAM(s) Oral two times a day  spironolactone 25 milliGRAM(s) Oral daily        Vital Signs Last 24 Hrs  T(C): 36.7 (26 Aug 2019 11:44), Max: 36.7 (26 Aug 2019 11:44)  T(F): 98.1 (26 Aug 2019 11:44), Max: 98.1 (26 Aug 2019 11:44)  HR: 79 (26 Aug 2019 13:50) (79 - 92)  BP: 116/68 (26 Aug 2019 16:58) (102/61 - 118/68)  BP(mean): --  RR: 18 (26 Aug 2019 11:44) (18 - 20)  SpO2: 100% (26 Aug 2019 11:44) (100% - 100%)    Physical Exam:  General: NAD  HEENT: EOMI, MMM  Cardio: distant heart sound, soft holosystolic murmur left sternal border  Resp: CTA b/l  GI: +BS, soft, NT/ND  MSK: minimal tenderness to palpation over the left ankle  Neuro: AAOx    LABS:                        9.3    9.2   )-----------( 403      ( 26 Aug 2019 05:58 )             28.2     08-26    134<L>  |  100  |  104<H>  ----------------------------<  203<H>  3.7   |  16<L>  |  2.14<H>    Ca    9.0      26 Aug 2019 05:58  Phos  4.6     08-26  Mg     2.4     08-26              RADIOLOGY & ADDITIONAL TESTS:

## 2019-08-26 NOTE — PROGRESS NOTE ADULT - ASSESSMENT
Assessment  DMT2: 73y Female with DM T2 with hyperglycemia on current insulin regimen (Humalog 46u TID before each meal, and mixed insulin 85u AM, 75u PM), sugars overall down-trending but remain elevated, she is eating partial meals.  CAD: S/P cabg On medications, stable, monitored.  Gout: on colchiceine as per rheum recommendations  Hypothyroidism: synthroid 50 mcg po daily, asymptomatic.  HTN: Controlled, On med.  HLD; on statin  CKD: Monitor labs/BMP      Plan:   DMT2:  Will order Prandin 1mg stat dose for patient, spoke with nurse.  Will otherwise continue current insulin regimen. Will continue monitoring FS, log, will Follow up.     CAD: S/P cabg On medications, stable, monitored.  Gout: continue colchiceine  Hypothyroidism: Continue synthroid 50 mcg po daily, asymptomatic. FU TFTs  HTN: Continue treatment, monitoring, Primary team FU  HLD; on statin  CKD: Continue monitoring labs, renal FU

## 2019-08-27 LAB
ANION GAP SERPL CALC-SCNC: 18 MMOL/L — HIGH (ref 5–17)
BUN SERPL-MCNC: 100 MG/DL — HIGH (ref 7–23)
CALCIUM SERPL-MCNC: 9.6 MG/DL — SIGNIFICANT CHANGE UP (ref 8.4–10.5)
CHLORIDE SERPL-SCNC: 101 MMOL/L — SIGNIFICANT CHANGE UP (ref 96–108)
CO2 SERPL-SCNC: 18 MMOL/L — LOW (ref 22–31)
CREAT SERPL-MCNC: 2.1 MG/DL — HIGH (ref 0.5–1.3)
CULTURE RESULTS: SIGNIFICANT CHANGE UP
CULTURE RESULTS: SIGNIFICANT CHANGE UP
GLUCOSE BLDC GLUCOMTR-MCNC: 185 MG/DL — HIGH (ref 70–99)
GLUCOSE BLDC GLUCOMTR-MCNC: 187 MG/DL — HIGH (ref 70–99)
GLUCOSE BLDC GLUCOMTR-MCNC: 245 MG/DL — HIGH (ref 70–99)
GLUCOSE BLDC GLUCOMTR-MCNC: 246 MG/DL — HIGH (ref 70–99)
GLUCOSE BLDC GLUCOMTR-MCNC: 257 MG/DL — HIGH (ref 70–99)
GLUCOSE BLDC GLUCOMTR-MCNC: 275 MG/DL — HIGH (ref 70–99)
GLUCOSE SERPL-MCNC: 171 MG/DL — HIGH (ref 70–99)
HCT VFR BLD CALC: 27.4 % — LOW (ref 34.5–45)
HGB BLD-MCNC: 9.2 G/DL — LOW (ref 11.5–15.5)
MCHC RBC-ENTMCNC: 28.7 PG — SIGNIFICANT CHANGE UP (ref 27–34)
MCHC RBC-ENTMCNC: 33.4 GM/DL — SIGNIFICANT CHANGE UP (ref 32–36)
MCV RBC AUTO: 85.8 FL — SIGNIFICANT CHANGE UP (ref 80–100)
PLATELET # BLD AUTO: 387 K/UL — SIGNIFICANT CHANGE UP (ref 150–400)
POTASSIUM SERPL-MCNC: 3.9 MMOL/L — SIGNIFICANT CHANGE UP (ref 3.5–5.3)
POTASSIUM SERPL-SCNC: 3.9 MMOL/L — SIGNIFICANT CHANGE UP (ref 3.5–5.3)
RBC # BLD: 3.2 M/UL — LOW (ref 3.8–5.2)
RBC # FLD: 15.8 % — HIGH (ref 10.3–14.5)
SODIUM SERPL-SCNC: 137 MMOL/L — SIGNIFICANT CHANGE UP (ref 135–145)
SPECIMEN SOURCE: SIGNIFICANT CHANGE UP
SPECIMEN SOURCE: SIGNIFICANT CHANGE UP
WBC # BLD: 8.3 K/UL — SIGNIFICANT CHANGE UP (ref 3.8–10.5)
WBC # FLD AUTO: 8.3 K/UL — SIGNIFICANT CHANGE UP (ref 3.8–10.5)

## 2019-08-27 RX ORDER — INSULIN LISPRO 100/ML
35 VIAL (ML) SUBCUTANEOUS
Refills: 0 | Status: DISCONTINUED | OUTPATIENT
Start: 2019-08-27 | End: 2019-08-28

## 2019-08-27 RX ORDER — REPAGLINIDE 1 MG/1
1 TABLET ORAL THREE TIMES A DAY
Refills: 0 | Status: DISCONTINUED | OUTPATIENT
Start: 2019-08-27 | End: 2019-08-30

## 2019-08-27 RX ADMIN — HEPARIN SODIUM 5000 UNIT(S): 5000 INJECTION INTRAVENOUS; SUBCUTANEOUS at 05:14

## 2019-08-27 RX ADMIN — Medication 0.25 MILLIGRAM(S): at 08:14

## 2019-08-27 RX ADMIN — ISOSORBIDE DINITRATE 30 MILLIGRAM(S): 5 TABLET ORAL at 22:18

## 2019-08-27 RX ADMIN — Medication 50 UNIT(S): at 08:10

## 2019-08-27 RX ADMIN — BUMETANIDE 3 MILLIGRAM(S): 0.25 INJECTION INTRAMUSCULAR; INTRAVENOUS at 05:11

## 2019-08-27 RX ADMIN — Medication 100 MILLIGRAM(S): at 05:11

## 2019-08-27 RX ADMIN — Medication 81 MILLIGRAM(S): at 11:59

## 2019-08-27 RX ADMIN — HEPARIN SODIUM 5000 UNIT(S): 5000 INJECTION INTRAVENOUS; SUBCUTANEOUS at 17:05

## 2019-08-27 RX ADMIN — PANTOPRAZOLE SODIUM 40 MILLIGRAM(S): 20 TABLET, DELAYED RELEASE ORAL at 05:11

## 2019-08-27 RX ADMIN — Medication 650 MILLIGRAM(S): at 05:12

## 2019-08-27 RX ADMIN — Medication 50 MILLIGRAM(S): at 13:55

## 2019-08-27 RX ADMIN — Medication 85 UNIT(S): at 08:11

## 2019-08-27 RX ADMIN — Medication 100 MILLIGRAM(S): at 17:06

## 2019-08-27 RX ADMIN — ISOSORBIDE DINITRATE 30 MILLIGRAM(S): 5 TABLET ORAL at 05:11

## 2019-08-27 RX ADMIN — Medication 50 MICROGRAM(S): at 05:11

## 2019-08-27 RX ADMIN — CLOPIDOGREL BISULFATE 75 MILLIGRAM(S): 75 TABLET, FILM COATED ORAL at 11:59

## 2019-08-27 RX ADMIN — SPIRONOLACTONE 25 MILLIGRAM(S): 25 TABLET, FILM COATED ORAL at 05:11

## 2019-08-27 RX ADMIN — LIDOCAINE 1 PATCH: 4 CREAM TOPICAL at 22:18

## 2019-08-27 RX ADMIN — Medication 1: at 08:10

## 2019-08-27 RX ADMIN — Medication 0.6 MILLIGRAM(S): at 12:00

## 2019-08-27 RX ADMIN — CHLORHEXIDINE GLUCONATE 1 APPLICATION(S): 213 SOLUTION TOPICAL at 22:17

## 2019-08-27 RX ADMIN — Medication 1 TABLET(S): at 11:59

## 2019-08-27 RX ADMIN — Medication 35 UNIT(S): at 17:05

## 2019-08-27 RX ADMIN — Medication 3 MILLIGRAM(S): at 22:19

## 2019-08-27 RX ADMIN — LIDOCAINE 1 PATCH: 4 CREAM TOPICAL at 10:18

## 2019-08-27 RX ADMIN — BUMETANIDE 3 MILLIGRAM(S): 0.25 INJECTION INTRAMUSCULAR; INTRAVENOUS at 17:05

## 2019-08-27 RX ADMIN — SENNA PLUS 2 TABLET(S): 8.6 TABLET ORAL at 22:19

## 2019-08-27 RX ADMIN — Medication 75 UNIT(S): at 17:06

## 2019-08-27 RX ADMIN — Medication 3: at 11:58

## 2019-08-27 RX ADMIN — ATORVASTATIN CALCIUM 40 MILLIGRAM(S): 80 TABLET, FILM COATED ORAL at 22:17

## 2019-08-27 RX ADMIN — CHLORHEXIDINE GLUCONATE 1 APPLICATION(S): 213 SOLUTION TOPICAL at 08:10

## 2019-08-27 RX ADMIN — Medication 0.25 MILLIGRAM(S): at 13:55

## 2019-08-27 RX ADMIN — ISOSORBIDE DINITRATE 30 MILLIGRAM(S): 5 TABLET ORAL at 13:55

## 2019-08-27 RX ADMIN — Medication 3: at 17:05

## 2019-08-27 RX ADMIN — POLYETHYLENE GLYCOL 3350 17 GRAM(S): 17 POWDER, FOR SOLUTION ORAL at 11:59

## 2019-08-27 RX ADMIN — Medication 650 MILLIGRAM(S): at 17:06

## 2019-08-27 RX ADMIN — LIDOCAINE 1 PATCH: 4 CREAM TOPICAL at 10:17

## 2019-08-27 RX ADMIN — MONTELUKAST 10 MILLIGRAM(S): 4 TABLET, CHEWABLE ORAL at 12:00

## 2019-08-27 RX ADMIN — Medication 50 UNIT(S): at 11:58

## 2019-08-27 RX ADMIN — Medication 50 MILLIGRAM(S): at 22:18

## 2019-08-27 RX ADMIN — Medication 25 MILLIGRAM(S): at 05:11

## 2019-08-27 RX ADMIN — Medication 50 MILLIGRAM(S): at 05:11

## 2019-08-27 RX ADMIN — REPAGLINIDE 1 MILLIGRAM(S): 1 TABLET ORAL at 17:32

## 2019-08-27 NOTE — PROGRESS NOTE ADULT - ASSESSMENT
Assessment  DMT2: 73y Female with DM T2 with hyperglycemia after one-time 1mg Prandin dose given yesterday, in addition to current insulin regimen (Humalog 50u TID before each meal, and mixed insulin 85u AM, 75u PM), FS remain elevated, she is eating partial meals and feels weak.  CAD: S/P cabg On medications, stable, monitored.  Gout: was on colchiceine  Hypothyroidism: synthroid 50 mcg po daily, asymptomatic.  HTN: Controlled, On med.  HLD; on statin  CKD: Monitor labs/BMP      Plan:   DMT2:  Will continue current insulin regimen. Will continue monitoring FS, log, and follow up.     CAD: S/P cabg On medications, stable, monitored.  Gout: ankle pain improved, d/c colchiceine as per rheum recommendations  Hypothyroidism: Continue synthroid 50 mcg po daily, asymptomatic. FU TFTs  HTN: Continue treatment, monitoring, Primary team FU  HLD; on statin  CKD: Continue monitoring labs, renal FU Assessment  DMT2: 73y Female with DM T2 with hyperglycemia after one-time 1mg Prandin dose given yesterday, in addition to current insulin regimen (Humalog 50u TID before each meal, and mixed insulin 85u AM, 75u PM), Blood sugars trending down after Prandin but remain elevated, she is eating partial meals and feels weak.  CAD: S/P cabg On medications, stable, monitored.  Gout: was on colchiceine  Hypothyroidism: synthroid 50 mcg po daily, asymptomatic.  HTN: Controlled, On med.  HLD; on statin  CKD: Monitor labs/BMP      Plan:   DMT2:  Will decrease Humalog to 35u before each meal and initiate Prandin 1mg before each meal. Will otherwise continue insulin regimen, continue monitoring FS, log, and follow up.     CAD: S/P cabg On medications, stable, monitored.  Gout: ankle pain improved, d/c colchiceine as per rheum recommendations  Hypothyroidism: Continue synthroid 50 mcg po daily, asymptomatic. FU TFTs  HTN: Continue treatment, monitoring, Primary team FU  HLD; on statin  CKD: Continue monitoring labs, renal FU

## 2019-08-27 NOTE — PROGRESS NOTE ADULT - SUBJECTIVE AND OBJECTIVE BOX
Chief complaint  Patient is a 73y old  Female who presents with a chief complaint of Hypoxia, SOB (27 Aug 2019 10:06)   Review of systems  Patient in bed, looks comfortable, no fever, no hypoglycemia.    Labs and Fingersticks  CAPILLARY BLOOD GLUCOSE      POCT Blood Glucose.: 257 mg/dL (27 Aug 2019 11:52)  POCT Blood Glucose.: 185 mg/dL (27 Aug 2019 07:52)  POCT Blood Glucose.: 187 mg/dL (27 Aug 2019 05:20)  POCT Blood Glucose.: 245 mg/dL (27 Aug 2019 00:08)  POCT Blood Glucose.: 229 mg/dL (26 Aug 2019 21:07)  POCT Blood Glucose.: 304 mg/dL (26 Aug 2019 16:22)  POCT Blood Glucose.: 268 mg/dL (26 Aug 2019 14:34)      Anion Gap, Serum: 18 <H> (08-27 @ 05:37)  Anion Gap, Serum: 18 <H> (08-26 @ 05:58)      Calcium, Total Serum: 9.6 (08-27 @ 05:37)  Calcium, Total Serum: 9.0 (08-26 @ 05:58)          08-27    137  |  101  |  100<H>  ----------------------------<  171<H>  3.9   |  18<L>  |  2.10<H>    Ca    9.6      27 Aug 2019 05:37  Phos  4.6     08-26  Mg     2.4     08-26                          9.2    8.3   )-----------( 387      ( 27 Aug 2019 05:37 )             27.4     Medications  MEDICATIONS  (STANDING):  acetaminophen  IVPB .. 1000 milliGRAM(s) IV Intermittent once  aspirin enteric coated 81 milliGRAM(s) Oral daily  atorvastatin 40 milliGRAM(s) Oral at bedtime  buMETAnide 3 milliGRAM(s) Oral every 12 hours  chlorhexidine 2% Cloths 1 Application(s) Topical daily  clopidogrel Tablet 75 milliGRAM(s) Oral daily  colchicine 0.6 milliGRAM(s) Oral daily  dextrose 5%. 1000 milliLiter(s) (50 mL/Hr) IV Continuous <Continuous>  dextrose 50% Injectable 12.5 Gram(s) IV Push once  dextrose 50% Injectable 25 Gram(s) IV Push once  dextrose 50% Injectable 25 Gram(s) IV Push once  docusate sodium 100 milliGRAM(s) Oral two times a day  heparin  Injectable 5000 Unit(s) SubCutaneous every 12 hours  hydrALAZINE 50 milliGRAM(s) Oral every 8 hours  insulin lispro (HumaLOG) corrective regimen sliding scale   SubCutaneous three times a day before meals  insulin lispro (HumaLOG) corrective regimen sliding scale   SubCutaneous at bedtime  insulin lispro Injectable (HumaLOG) 50 Unit(s) SubCutaneous three times a day before meals  insulin NPH/regular 70/30 Injectable 85 Unit(s) SubCutaneous before breakfast  insulin NPH/regular 70/30 Injectable 75 Unit(s) SubCutaneous before dinner  isosorbide   dinitrate Tablet (ISORDIL) 30 milliGRAM(s) Oral three times a day  lactated ringers. 1000 milliLiter(s) (75 mL/Hr) IV Continuous <Continuous>  levothyroxine 50 MICROGram(s) Oral daily  lidocaine   Patch 1 Patch Transdermal daily  melatonin 3 milliGRAM(s) Oral at bedtime  metoprolol succinate ER 25 milliGRAM(s) Oral daily  montelukast 10 milliGRAM(s) Oral daily  Nephro-enrico 1 Tablet(s) Oral daily  pantoprazole    Tablet 40 milliGRAM(s) Oral before breakfast  polyethylene glycol 3350 17 Gram(s) Oral daily  senna 2 Tablet(s) Oral at bedtime  sodium bicarbonate 650 milliGRAM(s) Oral two times a day  spironolactone 25 milliGRAM(s) Oral daily      Physical Exam  General: Patient comfortable in bed  Vital Signs Last 12 Hrs  T(F): 97.6 (08-27-19 @ 11:56), Max: 97.7 (08-27-19 @ 04:06)  HR: 85 (08-27-19 @ 11:56) (78 - 85)  BP: 107/65 (08-27-19 @ 11:56) (106/58 - 109/61)  BP(mean): --  RR: 18 (08-27-19 @ 11:56) (18 - 18)  SpO2: 100% (08-27-19 @ 11:56) (100% - 100%)  Neck: No palpable thyroid nodules.  CVS: S1S2, No murmurs  Respiratory: No wheezing, no crepitations  GI: Abdomen soft, bowel sounds positive  Musculoskeletal:  edema lower extremities.   Skin: No skin rashes, no ecchymosis    Diagnostics    Cortisol AM, Serum: AM Sched. Collection: 18-Aug-2019 06:00 (08-17 @ 12:14)  Cortisol AM, Serum: AM Sched. Collection: 19-Aug-2019 06:00 (08-18 @ 12:59)  Thyroid Stimulating Hormone, Serum: AM Sched. Collection: 26-Aug-2019 06:00 (08-25 @ 12:31)  Free Thyroxine, Serum: AM Sched. Collection: 26-Aug-2019 06:00 (08-25 @ 12:31)

## 2019-08-27 NOTE — PROGRESS NOTE ADULT - SUBJECTIVE AND OBJECTIVE BOX
CHIEF COMPLAINT:    SUBJECTIVE:     REVIEW OF SYSTEMS:    CONSTITUTIONAL: (  )  weakness,  (  ) fevers or chills  EYES/ENT: (  )visual changes;     NECK: (  ) pain or stiffness  RESPIRATORY:   (  )cough, wheezing, hemoptysis;  (  ) shortness of breath  CARDIOVASCULAR:  (  )chest pain or palpitations  GASTROINTESTINAL:   (  )abdominal or epigastric pain.  (  ) nausea, vomiting, or hematemesis;   (   ) diarrhea or constipation.   GENITOURINARY:   (    ) dysuria, frequency or hematuria  NEUROLOGICAL:  (   ) numbness or weakness   All other review of systems is negative unless indicated above    Vital Signs Last 24 Hrs  T(C): 36.5 (27 Aug 2019 04:06), Max: 36.7 (26 Aug 2019 11:44)  T(F): 97.7 (27 Aug 2019 04:06), Max: 98.1 (26 Aug 2019 11:44)  HR: 80 (27 Aug 2019 05:10) (78 - 89)  BP: 109/61 (27 Aug 2019 05:10) (102/61 - 118/68)  BP(mean): --  RR: 18 (27 Aug 2019 04:06) (18 - 20)  SpO2: 100% (27 Aug 2019 04:06) (98% - 100%)    I&O's Summary    26 Aug 2019 07:01  -  27 Aug 2019 07:00  --------------------------------------------------------  IN: 740 mL / OUT: 1350 mL / NET: -610 mL    27 Aug 2019 07:01  -  27 Aug 2019 10:07  --------------------------------------------------------  IN: 0 mL / OUT: 300 mL / NET: -300 mL        CAPILLARY BLOOD GLUCOSE      POCT Blood Glucose.: 185 mg/dL (27 Aug 2019 07:52)  POCT Blood Glucose.: 187 mg/dL (27 Aug 2019 05:20)  POCT Blood Glucose.: 245 mg/dL (27 Aug 2019 00:08)  POCT Blood Glucose.: 229 mg/dL (26 Aug 2019 21:07)  POCT Blood Glucose.: 304 mg/dL (26 Aug 2019 16:22)  POCT Blood Glucose.: 268 mg/dL (26 Aug 2019 14:34)  POCT Blood Glucose.: 249 mg/dL (26 Aug 2019 11:50)      PHYSICAL EXAM:    Constitutional:  (   ) NAD,   (   )awake and alert  HEENT: PERR, EOMI,    Neck: Soft and supple, No LAD, No JVD  Respiratory:  (    Breath sounds are clear bilaterally,    (   ) wheezing, rales or rhonchi  Cardiovascular:     (   )S1 and S2, regular rate and rhythm, no Murmurs, gallops or rubs  Gastrointestinal:  (   )Bowel Sounds present, soft,   (  )nontender, nondistended,    Extremities:    (  ) peripheral edema  Vascular: 2+ peripheral pulses  Neurological:    (    )A/O x 3,   (  ) focal deficits  Musculoskeletal:    (   )  normal strength b/l upper  (     ) normal  lower extremities  Skin: No rashes    MEDICATIONS:  MEDICATIONS  (STANDING):  acetaminophen  IVPB .. 1000 milliGRAM(s) IV Intermittent once  aspirin enteric coated 81 milliGRAM(s) Oral daily  atorvastatin 40 milliGRAM(s) Oral at bedtime  buMETAnide 3 milliGRAM(s) Oral every 12 hours  chlorhexidine 2% Cloths 1 Application(s) Topical daily  clopidogrel Tablet 75 milliGRAM(s) Oral daily  colchicine 0.6 milliGRAM(s) Oral daily  dextrose 5%. 1000 milliLiter(s) (50 mL/Hr) IV Continuous <Continuous>  dextrose 50% Injectable 12.5 Gram(s) IV Push once  dextrose 50% Injectable 25 Gram(s) IV Push once  dextrose 50% Injectable 25 Gram(s) IV Push once  docusate sodium 100 milliGRAM(s) Oral two times a day  heparin  Injectable 5000 Unit(s) SubCutaneous every 12 hours  hydrALAZINE 50 milliGRAM(s) Oral every 8 hours  insulin lispro (HumaLOG) corrective regimen sliding scale   SubCutaneous three times a day before meals  insulin lispro (HumaLOG) corrective regimen sliding scale   SubCutaneous at bedtime  insulin lispro Injectable (HumaLOG) 50 Unit(s) SubCutaneous three times a day before meals  insulin NPH/regular 70/30 Injectable 85 Unit(s) SubCutaneous before breakfast  insulin NPH/regular 70/30 Injectable 75 Unit(s) SubCutaneous before dinner  isosorbide   dinitrate Tablet (ISORDIL) 30 milliGRAM(s) Oral three times a day  lactated ringers. 1000 milliLiter(s) (75 mL/Hr) IV Continuous <Continuous>  levothyroxine 50 MICROGram(s) Oral daily  lidocaine   Patch 1 Patch Transdermal daily  melatonin 3 milliGRAM(s) Oral at bedtime  metoprolol succinate ER 25 milliGRAM(s) Oral daily  montelukast 10 milliGRAM(s) Oral daily  Nephro-enrico 1 Tablet(s) Oral daily  pantoprazole    Tablet 40 milliGRAM(s) Oral before breakfast  polyethylene glycol 3350 17 Gram(s) Oral daily  senna 2 Tablet(s) Oral at bedtime  sodium bicarbonate 650 milliGRAM(s) Oral two times a day  spironolactone 25 milliGRAM(s) Oral daily      LABS: All Labs Reviewed:                        9.2    8.3   )-----------( 387      ( 27 Aug 2019 05:37 )             27.4     08-27    137  |  101  |  100<H>  ----------------------------<  171<H>  3.9   |  18<L>  |  2.10<H>    Ca    9.6      27 Aug 2019 05:37  Phos  4.6     08-26  Mg     2.4     08-26            Blood Culture: 08-22 @ 14:50  Organism --  Gram Stain Blood -- Gram Stain --  Specimen Source .Blood  Culture-Blood --      Urine Culture      RADIOLOGY/EKG:    ASSESSMENT AND PLAN:    DVT PPX:    ADVANCED DIRECTIVE:    DISPOSITION: CHIEF COMPLAINT:  patient seen and examined in the bed awake and verbal still feel weak and her blood sugar seems to be trending down after Prandin usage on 8/26/2019  SUBJECTIVE:     REVIEW OF SYSTEMS:    CONSTITUTIONAL: ( x )  weakness,  (  ) fevers or chills  EYES/ENT: (  )visual changes;     NECK: (  ) pain or stiffness  RESPIRATORY:   (  )cough, wheezing, hemoptysis;  (  ) shortness of breath  CARDIOVASCULAR:  (  )chest pain or palpitations  GASTROINTESTINAL:   (  )abdominal or epigastric pain.  (  ) nausea, vomiting, or hematemesis;   (   ) diarrhea or constipation.   GENITOURINARY:   (    ) dysuria, frequency or hematuria  NEUROLOGICAL:  (   ) numbness or weakness   All other review of systems is negative unless indicated above    Vital Signs Last 24 Hrs  T(C): 36.5 (27 Aug 2019 04:06), Max: 36.7 (26 Aug 2019 11:44)  T(F): 97.7 (27 Aug 2019 04:06), Max: 98.1 (26 Aug 2019 11:44)  HR: 80 (27 Aug 2019 05:10) (78 - 89)  BP: 109/61 (27 Aug 2019 05:10) (102/61 - 118/68)  BP(mean): --  RR: 18 (27 Aug 2019 04:06) (18 - 20)  SpO2: 100% (27 Aug 2019 04:06) (98% - 100%)    I&O's Summary    26 Aug 2019 07:01  -  27 Aug 2019 07:00  --------------------------------------------------------  IN: 740 mL / OUT: 1350 mL / NET: -610 mL    27 Aug 2019 07:01  -  27 Aug 2019 10:07  --------------------------------------------------------  IN: 0 mL / OUT: 300 mL / NET: -300 mL        CAPILLARY BLOOD GLUCOSE      POCT Blood Glucose.: 185 mg/dL (27 Aug 2019 07:52)  POCT Blood Glucose.: 187 mg/dL (27 Aug 2019 05:20)  POCT Blood Glucose.: 245 mg/dL (27 Aug 2019 00:08)  POCT Blood Glucose.: 229 mg/dL (26 Aug 2019 21:07)  POCT Blood Glucose.: 304 mg/dL (26 Aug 2019 16:22)  POCT Blood Glucose.: 268 mg/dL (26 Aug 2019 14:34)  POCT Blood Glucose.: 249 mg/dL (26 Aug 2019 11:50)    ==============  POCT Blood Glucose.: 304 mg/dL (26 Aug 2019 16:22)  POCT Blood Glucose.: 268 mg/dL (26 Aug 2019 14:34)  POCT Blood Glucose.: 249 mg/dL (26 Aug 2019 11:50)  POCT Blood Glucose.: 224 mg/dL (26 Aug 2019 07:39)  POCT Blood Glucose.: 207 mg/dL (26 Aug 2019 06:25)  POCT Blood Glucose.: 318 mg/dL (26 Aug 2019 00:12)  POCT Blood Glucose.: 378 mg/dL (25 Aug 2019 21:27)      PHYSICAL EXAM:   General: NAD  HEENT: EOMI, MMM  Cardio: normal S1-S2  , soft holosystolic murmur left sternal border  Resp: CTA b/l  GI: +BS, soft, NT/ND  MSK: minimal tenderness to palpation over the left ankle  Neuro: AAOx         MEDICATIONS:  MEDICATIONS  (STANDING):  acetaminophen  IVPB .. 1000 milliGRAM(s) IV Intermittent once  aspirin enteric coated 81 milliGRAM(s) Oral daily  atorvastatin 40 milliGRAM(s) Oral at bedtime  buMETAnide 3 milliGRAM(s) Oral every 12 hours  chlorhexidine 2% Cloths 1 Application(s) Topical daily  clopidogrel Tablet 75 milliGRAM(s) Oral daily  colchicine 0.6 milliGRAM(s) Oral daily  dextrose 5%. 1000 milliLiter(s) (50 mL/Hr) IV Continuous <Continuous>  dextrose 50% Injectable 12.5 Gram(s) IV Push once  dextrose 50% Injectable 25 Gram(s) IV Push once  dextrose 50% Injectable 25 Gram(s) IV Push once  docusate sodium 100 milliGRAM(s) Oral two times a day  heparin  Injectable 5000 Unit(s) SubCutaneous every 12 hours  hydrALAZINE 50 milliGRAM(s) Oral every 8 hours  insulin lispro (HumaLOG) corrective regimen sliding scale   SubCutaneous three times a day before meals  insulin lispro (HumaLOG) corrective regimen sliding scale   SubCutaneous at bedtime  insulin lispro Injectable (HumaLOG) 50 Unit(s) SubCutaneous three times a day before meals  insulin NPH/regular 70/30 Injectable 85 Unit(s) SubCutaneous before breakfast  insulin NPH/regular 70/30 Injectable 75 Unit(s) SubCutaneous before dinner  isosorbide   dinitrate Tablet (ISORDIL) 30 milliGRAM(s) Oral three times a day  lactated ringers. 1000 milliLiter(s) (75 mL/Hr) IV Continuous <Continuous>  levothyroxine 50 MICROGram(s) Oral daily  lidocaine   Patch 1 Patch Transdermal daily  melatonin 3 milliGRAM(s) Oral at bedtime  metoprolol succinate ER 25 milliGRAM(s) Oral daily  montelukast 10 milliGRAM(s) Oral daily  Nephro-enrico 1 Tablet(s) Oral daily  pantoprazole    Tablet 40 milliGRAM(s) Oral before breakfast  polyethylene glycol 3350 17 Gram(s) Oral daily  senna 2 Tablet(s) Oral at bedtime  sodium bicarbonate 650 milliGRAM(s) Oral two times a day  spironolactone 25 milliGRAM(s) Oral daily      LABS: All Labs Reviewed:                        9.2    8.3   )-----------( 387      ( 27 Aug 2019 05:37 )             27.4     08-27    137  |  101  |  100<H>  ----------------------------<  171<H>  3.9   |  18<L>  |  2.10<H>    Ca    9.6      27 Aug 2019 05:37  Phos  4.6     08-26  Mg     2.4     08-26  ==========================               9.3    9.2   )-----------( 403      ( 26 Aug 2019 05:58 )             28.2     08-26    134<L>  |  100  |  104<H>  ----------------------------<  203<H>  3.7   |  16<L>  |  2.14<H>            Blood Culture: 08-22 @ 14:50  Organism --  Gram Stain Blood -- Gram Stain --  Specimen Source .Blood  Culture-Blood --      Urine Culture      RADIOLOGY/EKG:    ASSESSMENT AND PLAN:  74yo F admitted for management of heart failure decompensation, HTN, DM and CKD who developed left ankle and hand pain and swelling around 7 days ago, on exam with tenderness to palpation over the left ankle but no erythema and minimal edema. Risk factors for gout including age, elevated uric acid, diuretic therapy and prior episodes treated with prednisone in the same joint.    # Oligoarticular Gout/elevated uric acid/prior episodes/CKD/diuretic therapy: all these factor increase the risk for gout attacks  -Significant improvement with colchicine therapy   ont colchicine 0.6 mg    The patient will benefit of starting renally dose uric acid lowering therapy after discharge in 4-5 weeks.  #CHF/CAD continue above management  #DM type II is still in her blood sugar is elevated  ENDO consult and follow appreciated patient received 1 dose of Prandin continue with the above treatment and ask Endo if he can start Prandin 0.5 mg or 1 mg 3 times a day      insulin lispro Injectable (HumaLOG) 46 Unit(s) SubCutaneous three times a day with meals  insulin NPH/regular 70/30 Injectable 85 Unit(s) SubCutaneous before breakfast  insulin NPH/regular 70/30 Injectable 75 Unit(s) SubCutaneous before dinner    DVT PPX:    ADVANCED DIRECTIVE:    DISPOSITION:

## 2019-08-27 NOTE — PROGRESS NOTE ADULT - SUBJECTIVE AND OBJECTIVE BOX
[facility]   MD GONZALEZ SHAH D.O, PA    TELEPHONE NUMBERS:  OFFICE: [phone]  DR. SANTOYO CELL: [phone]  JUSTEN FIELD CELL: [phone]  DR. RIDER CELL:  [phone]    RENAL FOLLOW UP NOTE  --------------------------------------------------------------------------------  HPI: Pt seen and examined at bedside. Pt sitting up in chair. Tolerating oral intake. States she is weak   Denies SOB, chest pain     PAST HISTORY  --------------------------------------------------------------------------------  No significant changes to PMH, PSH, FHx, SHx, unless otherwise noted    ALLERGIES & MEDICATIONS  --------------------------------------------------------------------------------  Allergies    azithromycin (Hives; Pruritus)    Intolerances      Standing Inpatient Medications  acetaminophen  IVPB .. 1000 milliGRAM(s) IV Intermittent once  aspirin enteric coated 81 milliGRAM(s) Oral daily  atorvastatin 40 milliGRAM(s) Oral at bedtime  buMETAnide 3 milliGRAM(s) Oral every 12 hours  chlorhexidine 2% Cloths 1 Application(s) Topical daily  clopidogrel Tablet 75 milliGRAM(s) Oral daily  colchicine 0.6 milliGRAM(s) Oral daily  dextrose 5%. 1000 milliLiter(s) IV Continuous <Continuous>  dextrose 50% Injectable 12.5 Gram(s) IV Push once  dextrose 50% Injectable 25 Gram(s) IV Push once  dextrose 50% Injectable 25 Gram(s) IV Push once  docusate sodium 100 milliGRAM(s) Oral two times a day  heparin  Injectable 5000 Unit(s) SubCutaneous every 12 hours  hydrALAZINE 50 milliGRAM(s) Oral every 8 hours  insulin lispro (HumaLOG) corrective regimen sliding scale   SubCutaneous three times a day before meals  insulin lispro (HumaLOG) corrective regimen sliding scale   SubCutaneous at bedtime  insulin lispro Injectable (HumaLOG) 50 Unit(s) SubCutaneous three times a day before meals  insulin NPH/regular 70/30 Injectable 85 Unit(s) SubCutaneous before breakfast  insulin NPH/regular 70/30 Injectable 75 Unit(s) SubCutaneous before dinner  isosorbide   dinitrate Tablet (ISORDIL) 30 milliGRAM(s) Oral three times a day  lactated ringers. 1000 milliLiter(s) IV Continuous <Continuous>  levothyroxine 50 MICROGram(s) Oral daily  lidocaine   Patch 1 Patch Transdermal daily  melatonin 3 milliGRAM(s) Oral at bedtime  metoprolol succinate ER 25 milliGRAM(s) Oral daily  montelukast 10 milliGRAM(s) Oral daily  Nephro-enrico 1 Tablet(s) Oral daily  pantoprazole    Tablet 40 milliGRAM(s) Oral before breakfast  polyethylene glycol 3350 17 Gram(s) Oral daily  senna 2 Tablet(s) Oral at bedtime  sodium bicarbonate 650 milliGRAM(s) Oral two times a day  spironolactone 25 milliGRAM(s) Oral daily    PRN Inpatient Medications  acetaminophen   Tablet .. 650 milliGRAM(s) Oral every 6 hours PRN  ALBUTerol/ipratropium for Nebulization 3 milliLiter(s) Nebulizer every 6 hours PRN  ALPRAZolam 0.25 milliGRAM(s) Oral three times a day PRN  dextrose 40% Gel 15 Gram(s) Oral once PRN  glucagon  Injectable 1 milliGRAM(s) IntraMuscular once PRN  traMADol 25 milliGRAM(s) Oral every 6 hours PRN      REVIEW OF SYSTEMS  --------------------------------------------------------------------------------  General: no fever  CVS: no chest pain  RESP: no sob, no cough  ABD: no abdominal pain  : no dysuria,  MSK: no edema     VITALS/PHYSICAL EXAM  --------------------------------------------------------------------------------  T(C): 36.5 (08-27-19 @ 04:06), Max: 36.7 (08-26-19 @ 11:44)  HR: 80 (08-27-19 @ 05:10) (78 - 89)  BP: 109/61 (08-27-19 @ 05:10) (102/61 - 118/68)  RR: 18 (08-27-19 @ 04:06) (18 - 20)  SpO2: 100% (08-27-19 @ 04:06) (98% - 100%)  Wt(kg): --    08-26-19 @ 07:01  -  08-27-19 @ 07:00  --------------------------------------------------------  IN: 740 mL / OUT: 1350 mL / NET: -610 mL    08-27-19 @ 07:01  -  08-27-19 @ 09:24  --------------------------------------------------------  IN: 0 mL / OUT: 300 mL / NET: -300 mL      Physical Exam:  	Gen: NAD  	HEENT: MMM  	Pulm: Course BS B/L  	CV: S1S2  	Abd: Soft, +BS  	Ext: No LE edema B/L                      Neuro: Awake   	Skin: Warm and Dry     LABS/STUDIES  --------------------------------------------------------------------------------              9.2    8.3   >-----------<  387      [08-27-19 @ 05:37]              27.4     137  |  101  |  100  ----------------------------<  171      [08-27-19 @ 05:37]  3.9   |  18  |  2.10        Ca     9.6     [08-27-19 @ 05:37]      Mg     2.4     [08-26-19 @ 05:58]      Phos  4.6     [08-26-19 @ 05:58]      Creatinine Trend:  SCr 2.10 [08-27 @ 05:37]  SCr 2.14 [08-26 @ 05:58]  SCr 2.19 [08-25 @ 06:30]  SCr 1.94 [08-24 @ 06:35]  SCr 2.10 [08-23 @ 06:47]    Urinalysis - [08-22-19 @ 16:43]      Color Light Yellow / Appearance Clear / SG 1.020 / pH 6.0      Gluc >=1000 mg/dL / Ketone Negative  / Bili Negative / Urobili <2 mg/dL       Blood Negative / Protein Negative / Leuk Est Negative / Nitrite Negative      RBC  / WBC  / Hyaline  / Gran  / Sq Epi  / Non Sq Epi  / Bacteria       Iron 42, TIBC 348, %sat 12      [07-05-19 @ 22:32]  Ferritin 130      [07-05-19 @ 22:32]  .4 (Ca --)      [04-25-19 @ 05:45]   --  PTH 43.55 (Ca --)      [09-10-18 @ 07:15]   --  PTH 36.60 (Ca --)      [09-08-18 @ 03:15]   --  Vitamin D (25OH) 25.9      [05-01-19 @ 04:00]  HbA1c 7.4      [07-05-19 @ 22:32]  TSH 1.02      [08-26-19 @ 08:22]  Lipid: chol 148, , HDL 35,       [06-01-19 @ 08:00]

## 2019-08-27 NOTE — PROGRESS NOTE ADULT - ASSESSMENT
Pt is a 72 yo woman with PMHx of HTN, T2DM, CAD s/p CABG (LIMA to LAD, SVG to OM, SVG to PDA 2014 at [facility]), non-dilated ICM (EF 20-25%), severe mitral regurgitation s/p mitral clip (6/13/19 Dr. Newman), severe pulm HTN, CKD, hypothyroidism admitted with worsening SOB.    A/P:  MERVIN  Likely due to cardiogenic shock  Renal function remains stable today  subject to fluctuations based on Renal Perfusion.  Pt currently on Bumex 3 mg PO Q12 and spironolactone 25mg daily  - Scr currently stable   - Continue diuretic therapy per cardiology.  - Optimize glucose control  - Monitor renal function   - Avoid further nephrotoxics, NSAIDS, RCA    Hyponatremia  in the setting of hyperglycemia.   Currently stable. Monitor   Optimize glucose control    CKD stage 4:  baseline Scr 1.8-2.0. Scr stable today.   Renal function fluctuates sec to CHF  Monitor renal function at present    SOB:  in setting of HF. Improved on diuretic therapy.   Continue diuretics per cardiology    Acidosis   Sec to Renal failure  on sodium bicarb 650mg BID  Monitor serum Co2     Hypokalemia:  in the setting of diuretic therapy.   Serum potassium stable      Anemia:   Hb stable  Pt with iron deficiency anemia. n/a

## 2019-08-28 LAB
ANION GAP SERPL CALC-SCNC: 19 MMOL/L — HIGH (ref 5–17)
BUN SERPL-MCNC: 102 MG/DL — HIGH (ref 7–23)
CALCIUM SERPL-MCNC: 9.8 MG/DL — SIGNIFICANT CHANGE UP (ref 8.4–10.5)
CHLORIDE SERPL-SCNC: 99 MMOL/L — SIGNIFICANT CHANGE UP (ref 96–108)
CO2 SERPL-SCNC: 19 MMOL/L — LOW (ref 22–31)
CREAT SERPL-MCNC: 2.09 MG/DL — HIGH (ref 0.5–1.3)
GLUCOSE BLDC GLUCOMTR-MCNC: 214 MG/DL — HIGH (ref 70–99)
GLUCOSE BLDC GLUCOMTR-MCNC: 236 MG/DL — HIGH (ref 70–99)
GLUCOSE BLDC GLUCOMTR-MCNC: 297 MG/DL — HIGH (ref 70–99)
GLUCOSE BLDC GLUCOMTR-MCNC: 301 MG/DL — HIGH (ref 70–99)
GLUCOSE BLDC GLUCOMTR-MCNC: 301 MG/DL — HIGH (ref 70–99)
GLUCOSE BLDC GLUCOMTR-MCNC: 306 MG/DL — HIGH (ref 70–99)
GLUCOSE SERPL-MCNC: 239 MG/DL — HIGH (ref 70–99)
HCT VFR BLD CALC: 27.3 % — LOW (ref 34.5–45)
HGB BLD-MCNC: 8.9 G/DL — LOW (ref 11.5–15.5)
MCHC RBC-ENTMCNC: 28.5 PG — SIGNIFICANT CHANGE UP (ref 27–34)
MCHC RBC-ENTMCNC: 32.6 GM/DL — SIGNIFICANT CHANGE UP (ref 32–36)
MCV RBC AUTO: 87.5 FL — SIGNIFICANT CHANGE UP (ref 80–100)
PLATELET # BLD AUTO: 360 K/UL — SIGNIFICANT CHANGE UP (ref 150–400)
POTASSIUM SERPL-MCNC: 4.1 MMOL/L — SIGNIFICANT CHANGE UP (ref 3.5–5.3)
POTASSIUM SERPL-SCNC: 4.1 MMOL/L — SIGNIFICANT CHANGE UP (ref 3.5–5.3)
RBC # BLD: 3.12 M/UL — LOW (ref 3.8–5.2)
RBC # FLD: 16 % — HIGH (ref 10.3–14.5)
SODIUM SERPL-SCNC: 137 MMOL/L — SIGNIFICANT CHANGE UP (ref 135–145)
WBC # BLD: 8.2 K/UL — SIGNIFICANT CHANGE UP (ref 3.8–10.5)
WBC # FLD AUTO: 8.2 K/UL — SIGNIFICANT CHANGE UP (ref 3.8–10.5)

## 2019-08-28 RX ORDER — INSULIN LISPRO 100/ML
40 VIAL (ML) SUBCUTANEOUS
Refills: 0 | Status: DISCONTINUED | OUTPATIENT
Start: 2019-08-28 | End: 2019-08-30

## 2019-08-28 RX ADMIN — REPAGLINIDE 1 MILLIGRAM(S): 1 TABLET ORAL at 08:05

## 2019-08-28 RX ADMIN — Medication 100 MILLIGRAM(S): at 05:41

## 2019-08-28 RX ADMIN — Medication 50 MILLIGRAM(S): at 21:21

## 2019-08-28 RX ADMIN — Medication 3 MILLIGRAM(S): at 21:21

## 2019-08-28 RX ADMIN — Medication 2: at 21:29

## 2019-08-28 RX ADMIN — SPIRONOLACTONE 25 MILLIGRAM(S): 25 TABLET, FILM COATED ORAL at 05:42

## 2019-08-28 RX ADMIN — Medication 85 UNIT(S): at 08:05

## 2019-08-28 RX ADMIN — ATORVASTATIN CALCIUM 40 MILLIGRAM(S): 80 TABLET, FILM COATED ORAL at 21:21

## 2019-08-28 RX ADMIN — MONTELUKAST 10 MILLIGRAM(S): 4 TABLET, CHEWABLE ORAL at 12:27

## 2019-08-28 RX ADMIN — Medication 3: at 08:05

## 2019-08-28 RX ADMIN — HEPARIN SODIUM 5000 UNIT(S): 5000 INJECTION INTRAVENOUS; SUBCUTANEOUS at 05:41

## 2019-08-28 RX ADMIN — Medication 650 MILLIGRAM(S): at 18:35

## 2019-08-28 RX ADMIN — Medication 40 UNIT(S): at 17:25

## 2019-08-28 RX ADMIN — POLYETHYLENE GLYCOL 3350 17 GRAM(S): 17 POWDER, FOR SOLUTION ORAL at 12:27

## 2019-08-28 RX ADMIN — LIDOCAINE 1 PATCH: 4 CREAM TOPICAL at 08:06

## 2019-08-28 RX ADMIN — ISOSORBIDE DINITRATE 30 MILLIGRAM(S): 5 TABLET ORAL at 21:25

## 2019-08-28 RX ADMIN — Medication 50 MILLIGRAM(S): at 14:02

## 2019-08-28 RX ADMIN — ISOSORBIDE DINITRATE 30 MILLIGRAM(S): 5 TABLET ORAL at 14:02

## 2019-08-28 RX ADMIN — BUMETANIDE 3 MILLIGRAM(S): 0.25 INJECTION INTRAMUSCULAR; INTRAVENOUS at 06:47

## 2019-08-28 RX ADMIN — Medication 35 UNIT(S): at 08:05

## 2019-08-28 RX ADMIN — LIDOCAINE 1 PATCH: 4 CREAM TOPICAL at 21:20

## 2019-08-28 RX ADMIN — PANTOPRAZOLE SODIUM 40 MILLIGRAM(S): 20 TABLET, DELAYED RELEASE ORAL at 05:41

## 2019-08-28 RX ADMIN — REPAGLINIDE 1 MILLIGRAM(S): 1 TABLET ORAL at 17:25

## 2019-08-28 RX ADMIN — Medication 50 MILLIGRAM(S): at 06:47

## 2019-08-28 RX ADMIN — Medication 4: at 12:27

## 2019-08-28 RX ADMIN — Medication 81 MILLIGRAM(S): at 12:27

## 2019-08-28 RX ADMIN — REPAGLINIDE 1 MILLIGRAM(S): 1 TABLET ORAL at 12:27

## 2019-08-28 RX ADMIN — Medication 50 MICROGRAM(S): at 05:41

## 2019-08-28 RX ADMIN — ISOSORBIDE DINITRATE 30 MILLIGRAM(S): 5 TABLET ORAL at 05:40

## 2019-08-28 RX ADMIN — Medication 40 UNIT(S): at 12:32

## 2019-08-28 RX ADMIN — BUMETANIDE 3 MILLIGRAM(S): 0.25 INJECTION INTRAMUSCULAR; INTRAVENOUS at 17:25

## 2019-08-28 RX ADMIN — Medication 0.25 MILLIGRAM(S): at 08:05

## 2019-08-28 RX ADMIN — HEPARIN SODIUM 5000 UNIT(S): 5000 INJECTION INTRAVENOUS; SUBCUTANEOUS at 17:25

## 2019-08-28 RX ADMIN — Medication 4: at 17:24

## 2019-08-28 RX ADMIN — CLOPIDOGREL BISULFATE 75 MILLIGRAM(S): 75 TABLET, FILM COATED ORAL at 12:27

## 2019-08-28 RX ADMIN — Medication 75 UNIT(S): at 17:25

## 2019-08-28 RX ADMIN — Medication 1 TABLET(S): at 12:27

## 2019-08-28 RX ADMIN — Medication 0.6 MILLIGRAM(S): at 12:27

## 2019-08-28 RX ADMIN — Medication 650 MILLIGRAM(S): at 05:40

## 2019-08-28 RX ADMIN — CHLORHEXIDINE GLUCONATE 1 APPLICATION(S): 213 SOLUTION TOPICAL at 21:26

## 2019-08-28 RX ADMIN — Medication 25 MILLIGRAM(S): at 05:40

## 2019-08-28 NOTE — CHART NOTE - NSCHARTNOTEFT_GEN_A_CORE
Nutrition Follow Up Note    Patient seen for moderate malnutrition follow up on 6TOW. Pt is a 72 yo F with PMHx of HTN, T2DM (last HgbA1c 7.4%), CAD s/p CABG,  non-dilated ICM (EF 20-25%), severe mitral regurgitation s/p mitral clip, severe pulm HTN, CKD, hypothyroidism, presented to ED after experiencing worsening SOB and intermittent chest pain at [facility]. +acute on chronic HF, euvolemic currently. MERVIN on CKD, renal function fluctuates, nephrology following. End stage cardiomyopathy, no further interventions per chart. Palliative following.     Chart reviewed, events noted. Pt POCT blood glucose still elevated (Today : 214-297 mg/dL), endocrinology continues to follow to adjust insulin regimen. Pt now receiving standing 1 mg prandin pre-meals.     Source: Comprehensive chart review    Diet :  Consistent Carbohydrate (with evening snacks) + Low Sodium +1500ml Fluid Restriction + Halal    During RD interview, pt seemingly weak and minimally responsive. Reports poor to fair appetite and intake; confirms is drinking Sugar Free Health Shakes once daily. Denies nausea/vomiting or diarrhea/constipation at this time. Last BM yesterday (per RN, very large). RD also spoke to PCA who reports pt consumed ~40% of her breakfast this morning and sometimes has in-house meals supplemented with food brought in from home. Overall, pt likely consuming baseline PO intake as weights relatively stable; see weight trend below. At this time, pt continues to be unable to participate in extensive nutrition education at this time considering weak ability to utilize  phone and no family at bedside; RD to remain available and will follow up per protocol.      PO intake : 40-50%     Source for PO intake: patient, nursing flowsheets, RN/PCA     Enteral /Parenteral Nutrition: n/a    Daily Weight in k.3 (-), Weight in k.7 (-), Weight in k (-25), Weight in k.2 (-24), Weight in k.4 (-) - Weight fluctuations noted; however, largely stable at this time. Recommend continue to monitor daily weights to assess changes, if any.     Pertinent Medications: MEDICATIONS  (STANDING):  acetaminophen  IVPB .. 1000 milliGRAM(s) IV Intermittent once  aspirin enteric coated 81 milliGRAM(s) Oral daily  atorvastatin 40 milliGRAM(s) Oral at bedtime  buMETAnide 3 milliGRAM(s) Oral every 12 hours  chlorhexidine 2% Cloths 1 Application(s) Topical daily  clopidogrel Tablet 75 milliGRAM(s) Oral daily  colchicine 0.6 milliGRAM(s) Oral daily  dextrose 5%. 1000 milliLiter(s) (50 mL/Hr) IV Continuous <Continuous>  dextrose 50% Injectable 12.5 Gram(s) IV Push once  dextrose 50% Injectable 25 Gram(s) IV Push once  dextrose 50% Injectable 25 Gram(s) IV Push once  docusate sodium 100 milliGRAM(s) Oral two times a day  heparin  Injectable 5000 Unit(s) SubCutaneous every 12 hours  hydrALAZINE 50 milliGRAM(s) Oral every 8 hours  insulin lispro (HumaLOG) corrective regimen sliding scale   SubCutaneous three times a day before meals  insulin lispro (HumaLOG) corrective regimen sliding scale   SubCutaneous at bedtime  insulin lispro Injectable (HumaLOG) 35 Unit(s) SubCutaneous three times a day with meals  insulin NPH/regular 70/30 Injectable 85 Unit(s) SubCutaneous before breakfast  insulin NPH/regular 70/30 Injectable 75 Unit(s) SubCutaneous before dinner  isosorbide   dinitrate Tablet (ISORDIL) 30 milliGRAM(s) Oral three times a day  lactated ringers. 1000 milliLiter(s) (75 mL/Hr) IV Continuous <Continuous>  levothyroxine 50 MICROGram(s) Oral daily  lidocaine   Patch 1 Patch Transdermal daily  melatonin 3 milliGRAM(s) Oral at bedtime  metoprolol succinate ER 25 milliGRAM(s) Oral daily  montelukast 10 milliGRAM(s) Oral daily  Nephro-enrico 1 Tablet(s) Oral daily  pantoprazole    Tablet 40 milliGRAM(s) Oral before breakfast  polyethylene glycol 3350 17 Gram(s) Oral daily  repaglinide 1 milliGRAM(s) Oral three times a day  senna 2 Tablet(s) Oral at bedtime  sodium bicarbonate 650 milliGRAM(s) Oral two times a day  spironolactone 25 milliGRAM(s) Oral daily    MEDICATIONS  (PRN):  acetaminophen   Tablet .. 650 milliGRAM(s) Oral every 6 hours PRN Mild Pain (1 - 3), Moderate Pain (4 - 6)  ALBUTerol/ipratropium for Nebulization 3 milliLiter(s) Nebulizer every 6 hours PRN Shortness of Breath  ALPRAZolam 0.25 milliGRAM(s) Oral three times a day PRN Anxiety  dextrose 40% Gel 15 Gram(s) Oral once PRN Blood Glucose LESS THAN 70 milliGRAM(s)/deciliter  glucagon  Injectable 1 milliGRAM(s) IntraMuscular once PRN Glucose LESS THAN 70 milligrams/deciliter  traMADol 25 milliGRAM(s) Oral every 6 hours PRN Moderate Pain (4 - 6)    Pertinent Labs:  @ 06:18: Na 137, <H>, Cr 2.09<H>, <H>, K+ 4.1    Finger Sticks:  POCT Blood Glucose.: 297 mg/dL ( @ 07:35)  POCT Blood Glucose.: 236 mg/dL ( @ 04:37)  POCT Blood Glucose.: 214 mg/dL ( @ 00:22)  POCT Blood Glucose.: 246 mg/dL ( @ 21:22)  POCT Blood Glucose.: 275 mg/dL ( @ 16:30)  POCT Blood Glucose.: 257 mg/dL ( @ 11:52)      Skin per nursing documentation: no pressure injuries at this time, pt with incontinence/moisture associated dermatitis    Edema per nursing documentation: none noted today at this time, yesterday () none noted    Estimated Needs:   [x ] no change since previous assessment  [ ] recalculated:     Previous Nutrition Diagnosis: Predicted suboptimal energy intake --> Malnutrition, moderate (acute on chronic)  Nutrition Diagnosis is: ongoing, being addressed with oral nutrition supplement, micronutrient supplementation, liberalized diet. Pt's medical condition precludes aggressive nutrition interventions at this time.     New Nutrition Diagnosis: n/a    Recommend  1) Continue current diet order of Consistent Carbohydrate (with evening snack) + Low Sodium + Halal diet. Pt also noted to be on 1500 mL fluid restriction at this time; defer fluid needs to team.   2) RD to continue to provide Sugar Free Health Shake 1x daily  3) Continue Nephro-enrico  4) RD to follow up to provide nutrition education as feasible to pt/caregivers  5) Continue daily weights    Monitoring and Evaluation:     Continue to monitor Nutritional intake, Tolerance to diet prescription, weights, labs, skin integrity.    RD remains available upon request and will follow up per protocol    Estephania Maria RD, CDN    Pager# [phone]

## 2019-08-28 NOTE — PROGRESS NOTE ADULT - PROBLEM SELECTOR PLAN 2
Per [facility] agent:  Stomach discomfort    Patient is calling from [phone]    Denies chest pain  Denies respiratory distress  Denies loss of consciousness  Denies uncontrolled bleeding    Colonoscopy on June 28.  \"I've been feeling discomfort in top part of my stomach. Especially when I bend over.\"  No vomiting.  No blood in stool.  No fever.  Antacids do not relieve pain.  Breathing is normal and unlabored.    Patient was triaged. This RN reached 24 hour disposition based on patient's reported symptoms.  No appointment availability with PCP within 24 hours. Offered other appointments but patient only wants to see her doctor.  Patient informed that a high priority message will be sent to PCP.     keshawn following for Dr. Banks   diabetic diet    continue lantus and humalog   glycemic control keshawn following for Dr. Banks   diabetic diet  continue lantus and humalog   glycemic control

## 2019-08-28 NOTE — PROGRESS NOTE ADULT - SUBJECTIVE AND OBJECTIVE BOX
[facility]   MD GONZALEZ SHAH D.O, PA    TELEPHONE NUMBERS:  OFFICE: [phone]  DR. SANTOYO CELL: [phone]  JUSTEN FIELD CELL: [phone]  DR. RIDER CELL:  [phone]    RENAL FOLLOW UP NOTE  --------------------------------------------------------------------------------  HPI: Pt seen and examined at bedside.   Keira SOB, chest pain     PAST HISTORY  --------------------------------------------------------------------------------  No significant changes to PMH, PSH, FHx, SHx, unless otherwise noted    ALLERGIES & MEDICATIONS  --------------------------------------------------------------------------------  Allergies    azithromycin (Hives; Pruritus)    Intolerances      Standing Inpatient Medications  acetaminophen  IVPB .. 1000 milliGRAM(s) IV Intermittent once  aspirin enteric coated 81 milliGRAM(s) Oral daily  atorvastatin 40 milliGRAM(s) Oral at bedtime  buMETAnide 3 milliGRAM(s) Oral every 12 hours  chlorhexidine 2% Cloths 1 Application(s) Topical daily  clopidogrel Tablet 75 milliGRAM(s) Oral daily  colchicine 0.6 milliGRAM(s) Oral daily  dextrose 5%. 1000 milliLiter(s) IV Continuous <Continuous>  dextrose 50% Injectable 12.5 Gram(s) IV Push once  dextrose 50% Injectable 25 Gram(s) IV Push once  dextrose 50% Injectable 25 Gram(s) IV Push once  docusate sodium 100 milliGRAM(s) Oral two times a day  heparin  Injectable 5000 Unit(s) SubCutaneous every 12 hours  hydrALAZINE 50 milliGRAM(s) Oral every 8 hours  insulin lispro (HumaLOG) corrective regimen sliding scale   SubCutaneous three times a day before meals  insulin lispro (HumaLOG) corrective regimen sliding scale   SubCutaneous at bedtime  insulin lispro Injectable (HumaLOG) 35 Unit(s) SubCutaneous three times a day with meals  insulin NPH/regular 70/30 Injectable 85 Unit(s) SubCutaneous before breakfast  insulin NPH/regular 70/30 Injectable 75 Unit(s) SubCutaneous before dinner  isosorbide   dinitrate Tablet (ISORDIL) 30 milliGRAM(s) Oral three times a day  lactated ringers. 1000 milliLiter(s) IV Continuous <Continuous>  levothyroxine 50 MICROGram(s) Oral daily  lidocaine   Patch 1 Patch Transdermal daily  melatonin 3 milliGRAM(s) Oral at bedtime  metoprolol succinate ER 25 milliGRAM(s) Oral daily  montelukast 10 milliGRAM(s) Oral daily  Nephro-enrico 1 Tablet(s) Oral daily  pantoprazole    Tablet 40 milliGRAM(s) Oral before breakfast  polyethylene glycol 3350 17 Gram(s) Oral daily  repaglinide 1 milliGRAM(s) Oral three times a day  senna 2 Tablet(s) Oral at bedtime  sodium bicarbonate 650 milliGRAM(s) Oral two times a day  spironolactone 25 milliGRAM(s) Oral daily    PRN Inpatient Medications  acetaminophen   Tablet .. 650 milliGRAM(s) Oral every 6 hours PRN  ALBUTerol/ipratropium for Nebulization 3 milliLiter(s) Nebulizer every 6 hours PRN  ALPRAZolam 0.25 milliGRAM(s) Oral three times a day PRN  dextrose 40% Gel 15 Gram(s) Oral once PRN  glucagon  Injectable 1 milliGRAM(s) IntraMuscular once PRN  traMADol 25 milliGRAM(s) Oral every 6 hours PRN      REVIEW OF SYSTEMS  --------------------------------------------------------------------------------  General: no fever  CVS: no chest pain  RESP: no sob, no cough  ABD: no abdominal pain  : no dysuria  MSK: no edema     VITALS/PHYSICAL EXAM  --------------------------------------------------------------------------------  T(C): 37 (08-28-19 @ 04:37), Max: 37 (08-28-19 @ 04:37)  HR: 85 (08-28-19 @ 06:45) (85 - 92)  BP: 101/56 (08-28-19 @ 06:45) (101/56 - 114/60)  RR: 18 (08-28-19 @ 04:37) (18 - 18)  SpO2: 100% (08-28-19 @ 04:37) (99% - 100%)  Wt(kg): --    08-27-19 @ 07:01  -  08-28-19 @ 07:00  --------------------------------------------------------  IN: 580 mL / OUT: 800 mL / NET: -220 mL      Physical Exam:  	Gen: NAD  	HEENT: MMM  	Pulm: CTA B/L  	CV: S1S2  	Abd: Soft, +BS  	Ext: No LE edema B/L                      Neuro: Awake   	Skin: Warm and Dry     LABS/STUDIES  --------------------------------------------------------------------------------              8.9    8.20  >-----------<  360      [08-28-19 @ 09:15]              27.3     137  |  99  |  102  ----------------------------<  239      [08-28-19 @ 06:18]  4.1   |  19  |  2.09        Ca     9.8     [08-28-19 @ 06:18]    Creatinine Trend:  SCr 2.09 [08-28 @ 06:18]  SCr 2.10 [08-27 @ 05:37]  SCr 2.14 [08-26 @ 05:58]  SCr 2.19 [08-25 @ 06:30]  SCr 1.94 [08-24 @ 06:35]    Urinalysis - [08-22-19 @ 16:43]      Color Light Yellow / Appearance Clear / SG 1.020 / pH 6.0      Gluc >=1000 mg/dL / Ketone Negative  / Bili Negative / Urobili <2 mg/dL       Blood Negative / Protein Negative / Leuk Est Negative / Nitrite Negative      RBC  / WBC  / Hyaline  / Gran  / Sq Epi  / Non Sq Epi  / Bacteria       Iron 42, TIBC 348, %sat 12      [07-05-19 @ 22:32]  Ferritin 130      [07-05-19 @ 22:32]  .4 (Ca --)      [04-25-19 @ 05:45]   --  PTH 43.55 (Ca --)      [09-10-18 @ 07:15]   --  PTH 36.60 (Ca --)      [09-08-18 @ 03:15]   --  Vitamin D (25OH) 25.9      [05-01-19 @ 04:00]  HbA1c 7.4      [07-05-19 @ 22:32]  TSH 1.02      [08-26-19 @ 08:22]  Lipid: chol 148, , HDL 35,       [06-01-19 @ 08:00]

## 2019-08-28 NOTE — PROGRESS NOTE ADULT - ASSESSMENT
Pt is a 72 yo woman with PMHx of HTN, T2DM, CAD s/p CABG (LIMA to LAD, SVG to OM, SVG to PDA 2014 at [facility]), non-dilated ICM (EF 20-25%), severe mitral regurgitation s/p mitral clip (6/13/19 Dr. Newman), severe pulm HTN, CKD, hypothyroidism admitted with worsening SOB.    A/P:  MERVIN  Likely due to cardiogenic shock  Renal function remains stable today  subject to fluctuations based on Renal Perfusion.  Pt currently on Bumex 3 mg PO Q12 and spironolactone 25mg daily  - Scr currently stable   - Continue diuretic therapy per cardiology.  - Optimize glucose control  - Monitor renal function   - Avoid further nephrotoxics, NSAIDS, RCA    Hyponatremia  in the setting of hyperglycemia.   Currently stable. Monitor   Optimize glucose control    CKD stage 4:  baseline Scr 1.8-2.0. Scr stable today.   Renal function fluctuates sec to CHF  Monitor renal function at present    SOB:  in setting of HF. Improved on diuretic therapy.   Continue diuretics per cardiology    Acidosis   Sec to Renal failure  on sodium bicarb 650mg BID  Monitor serum Co2     Hypokalemia:  in the setting of diuretic therapy.   Serum potassium stable      Anemia:   Hb stable  Pt with iron deficiency anemia.

## 2019-08-28 NOTE — PROGRESS NOTE ADULT - ASSESSMENT
Assessment  DMT2: 73y Female with DM T2 with hyperglycemia after adjusting her insulin regimen yesterday, blood sugars remain elevated, she is eating partial meals and feels weak.  CAD: S/P cabg On medications, stable, monitored.  Gout: was on colchiceine  Hypothyroidism: synthroid 50 mcg po daily, asymptomatic.  HTN: Controlled, On med.  HLD; on statin  CKD: Monitor labs/BMP      Plan:   DMT2:  Will increase Humalog to 40u before each meal and continue Prandin 1mg before each meal, and continue NPH 85AM 75PM. Also alerted nurse to try new injection site for increased insulin absorption. Will continue monitoring FS, log, and follow up.     CAD: S/P cabg On medications, stable, monitored.  Gout: ankle pain improved, d/c colchiceine as per rheum recommendations  Hypothyroidism: Continue synthroid 50 mcg po daily, asymptomatic. FU TFTs  HTN: Continue treatment, monitoring, Primary team FU  HLD; on statin  CKD: Continue monitoring labs, renal FU

## 2019-08-28 NOTE — PROGRESS NOTE ADULT - SUBJECTIVE AND OBJECTIVE BOX
CHIEF COMPLAINT:    SUBJECTIVE:     REVIEW OF SYSTEMS:    CONSTITUTIONAL: (  )  weakness,  (  ) fevers or chills  EYES/ENT: (  )visual changes;     NECK: (  ) pain or stiffness  RESPIRATORY:   (  )cough, wheezing, hemoptysis;  (  ) shortness of breath  CARDIOVASCULAR:  (  )chest pain or palpitations  GASTROINTESTINAL:   (  )abdominal or epigastric pain.  (  ) nausea, vomiting, or hematemesis;   (   ) diarrhea or constipation.   GENITOURINARY:   (    ) dysuria, frequency or hematuria  NEUROLOGICAL:  (   ) numbness or weakness   All other review of systems is negative unless indicated above    Vital Signs Last 24 Hrs  T(C): 37 (28 Aug 2019 04:37), Max: 37 (28 Aug 2019 04:37)  T(F): 98.6 (28 Aug 2019 04:37), Max: 98.6 (28 Aug 2019 04:37)  HR: 85 (28 Aug 2019 06:45) (85 - 92)  BP: 101/56 (28 Aug 2019 06:45) (101/56 - 114/60)  BP(mean): --  RR: 18 (28 Aug 2019 04:37) (18 - 18)  SpO2: 100% (28 Aug 2019 04:37) (99% - 100%)    I&O's Summary    27 Aug 2019 07:01  -  28 Aug 2019 07:00  --------------------------------------------------------  IN: 580 mL / OUT: 800 mL / NET: -220 mL        CAPILLARY BLOOD GLUCOSE      POCT Blood Glucose.: 297 mg/dL (28 Aug 2019 07:35)  POCT Blood Glucose.: 236 mg/dL (28 Aug 2019 04:37)  POCT Blood Glucose.: 214 mg/dL (28 Aug 2019 00:22)  POCT Blood Glucose.: 246 mg/dL (27 Aug 2019 21:22)  POCT Blood Glucose.: 275 mg/dL (27 Aug 2019 16:30)  POCT Blood Glucose.: 257 mg/dL (27 Aug 2019 11:52)      PHYSICAL EXAM:    Constitutional:  (   ) NAD,   (   )awake and alert  HEENT: PERR, EOMI,    Neck: Soft and supple, No LAD, No JVD  Respiratory:  (    Breath sounds are clear bilaterally,    (   ) wheezing, rales or rhonchi  Cardiovascular:     (   )S1 and S2, regular rate and rhythm, no Murmurs, gallops or rubs  Gastrointestinal:  (   )Bowel Sounds present, soft,   (  )nontender, nondistended,    Extremities:    (  ) peripheral edema  Vascular: 2+ peripheral pulses  Neurological:    (    )A/O x 3,   (  ) focal deficits  Musculoskeletal:    (   )  normal strength b/l upper  (     ) normal  lower extremities  Skin: No rashes    MEDICATIONS:  MEDICATIONS  (STANDING):  acetaminophen  IVPB .. 1000 milliGRAM(s) IV Intermittent once  aspirin enteric coated 81 milliGRAM(s) Oral daily  atorvastatin 40 milliGRAM(s) Oral at bedtime  buMETAnide 3 milliGRAM(s) Oral every 12 hours  chlorhexidine 2% Cloths 1 Application(s) Topical daily  clopidogrel Tablet 75 milliGRAM(s) Oral daily  colchicine 0.6 milliGRAM(s) Oral daily  dextrose 5%. 1000 milliLiter(s) (50 mL/Hr) IV Continuous <Continuous>  dextrose 50% Injectable 12.5 Gram(s) IV Push once  dextrose 50% Injectable 25 Gram(s) IV Push once  dextrose 50% Injectable 25 Gram(s) IV Push once  docusate sodium 100 milliGRAM(s) Oral two times a day  heparin  Injectable 5000 Unit(s) SubCutaneous every 12 hours  hydrALAZINE 50 milliGRAM(s) Oral every 8 hours  insulin lispro (HumaLOG) corrective regimen sliding scale   SubCutaneous three times a day before meals  insulin lispro (HumaLOG) corrective regimen sliding scale   SubCutaneous at bedtime  insulin lispro Injectable (HumaLOG) 35 Unit(s) SubCutaneous three times a day with meals  insulin NPH/regular 70/30 Injectable 85 Unit(s) SubCutaneous before breakfast  insulin NPH/regular 70/30 Injectable 75 Unit(s) SubCutaneous before dinner  isosorbide   dinitrate Tablet (ISORDIL) 30 milliGRAM(s) Oral three times a day  lactated ringers. 1000 milliLiter(s) (75 mL/Hr) IV Continuous <Continuous>  levothyroxine 50 MICROGram(s) Oral daily  lidocaine   Patch 1 Patch Transdermal daily  melatonin 3 milliGRAM(s) Oral at bedtime  metoprolol succinate ER 25 milliGRAM(s) Oral daily  montelukast 10 milliGRAM(s) Oral daily  Nephro-enrico 1 Tablet(s) Oral daily  pantoprazole    Tablet 40 milliGRAM(s) Oral before breakfast  polyethylene glycol 3350 17 Gram(s) Oral daily  repaglinide 1 milliGRAM(s) Oral three times a day  senna 2 Tablet(s) Oral at bedtime  sodium bicarbonate 650 milliGRAM(s) Oral two times a day  spironolactone 25 milliGRAM(s) Oral daily      LABS: All Labs Reviewed:                        9.2    8.3   )-----------( 387      ( 27 Aug 2019 05:37 )             27.4     08-28    137  |  99  |  102<H>  ----------------------------<  239<H>  4.1   |  19<L>  |  2.09<H>    Ca    9.8      28 Aug 2019 06:18            Blood Culture:   Urine Culture      RADIOLOGY/EKG:    ASSESSMENT AND PLAN:    DVT PPX:    ADVANCED DIRECTIVE:    DISPOSITION: CHIEF COMPLAINT:   patient had one episode of this will be which required extra nebulizer  But  significant improvement on L-Ankle and right hand pain. after colchicine therapy she reports that the pain has improved  SUBJECTIVE:     REVIEW OF SYSTEMS:    CONSTITUTIONAL: ( x )  weakness,  (  ) fevers or chills  EYES/ENT: (  )visual changes;     NECK: (  ) pain or stiffness  RESPIRATORY:   (  )cough, wheezing, hemoptysis;  ( x ) shortness of breath  CARDIOVASCULAR:  (  )chest pain or palpitations  GASTROINTESTINAL:   (  )abdominal or epigastric pain.  (  ) nausea, vomiting, or hematemesis;   (   ) diarrhea or constipation.   GENITOURINARY:   (    ) dysuria, frequency or hematuria  NEUROLOGICAL:  (   ) numbness or weakness   All other review of systems is negative unless indicated above    Vital Signs Last 24 Hrs  T(C): 37 (28 Aug 2019 04:37), Max: 37 (28 Aug 2019 04:37)  T(F): 98.6 (28 Aug 2019 04:37), Max: 98.6 (28 Aug 2019 04:37)  HR: 85 (28 Aug 2019 06:45) (85 - 92)  BP: 101/56 (28 Aug 2019 06:45) (101/56 - 114/60)  BP(mean): --  RR: 18 (28 Aug 2019 04:37) (18 - 18)  SpO2: 100% (28 Aug 2019 04:37) (99% - 100%)    I&O's Summary    27 Aug 2019 07:01  -  28 Aug 2019 07:00  --------------------------------------------------------  IN: 580 mL / OUT: 800 mL / NET: -220 mL        CAPILLARY BLOOD GLUCOSE      POCT Blood Glucose.: 297 mg/dL (28 Aug 2019 07:35)  POCT Blood Glucose.: 236 mg/dL (28 Aug 2019 04:37)  POCT Blood Glucose.: 214 mg/dL (28 Aug 2019 00:22)  POCT Blood Glucose.: 246 mg/dL (27 Aug 2019 21:22)  POCT Blood Glucose.: 275 mg/dL (27 Aug 2019 16:30)  POCT Blood Glucose.: 257 mg/dL (27 Aug 2019 11:52)      PHYSICAL EXAM:   General: NAD  HEENT: EOMI, MMM  Cardio: normal S1-S2  , soft holosystolic murmur left sternal border  Resp: CTA b/l  GI: +BS, soft, NT/ND  MSK: minimal tenderness to palpation over the left ankle  Neuro: AAOx       MEDICATIONS:  MEDICATIONS  (STANDING):  acetaminophen  IVPB .. 1000 milliGRAM(s) IV Intermittent once  aspirin enteric coated 81 milliGRAM(s) Oral daily  atorvastatin 40 milliGRAM(s) Oral at bedtime  buMETAnide 3 milliGRAM(s) Oral every 12 hours  chlorhexidine 2% Cloths 1 Application(s) Topical daily  clopidogrel Tablet 75 milliGRAM(s) Oral daily  colchicine 0.6 milliGRAM(s) Oral daily  dextrose 5%. 1000 milliLiter(s) (50 mL/Hr) IV Continuous <Continuous>  dextrose 50% Injectable 12.5 Gram(s) IV Push once  dextrose 50% Injectable 25 Gram(s) IV Push once  dextrose 50% Injectable 25 Gram(s) IV Push once  docusate sodium 100 milliGRAM(s) Oral two times a day  heparin  Injectable 5000 Unit(s) SubCutaneous every 12 hours  hydrALAZINE 50 milliGRAM(s) Oral every 8 hours  insulin lispro (HumaLOG) corrective regimen sliding scale   SubCutaneous three times a day before meals  insulin lispro (HumaLOG) corrective regimen sliding scale   SubCutaneous at bedtime  insulin lispro Injectable (HumaLOG) 35 Unit(s) SubCutaneous three times a day with meals  insulin NPH/regular 70/30 Injectable 85 Unit(s) SubCutaneous before breakfast  insulin NPH/regular 70/30 Injectable 75 Unit(s) SubCutaneous before dinner  isosorbide   dinitrate Tablet (ISORDIL) 30 milliGRAM(s) Oral three times a day  lactated ringers. 1000 milliLiter(s) (75 mL/Hr) IV Continuous <Continuous>  levothyroxine 50 MICROGram(s) Oral daily  lidocaine   Patch 1 Patch Transdermal daily  melatonin 3 milliGRAM(s) Oral at bedtime  metoprolol succinate ER 25 milliGRAM(s) Oral daily  montelukast 10 milliGRAM(s) Oral daily  Nephro-enrico 1 Tablet(s) Oral daily  pantoprazole    Tablet 40 milliGRAM(s) Oral before breakfast  polyethylene glycol 3350 17 Gram(s) Oral daily  repaglinide 1 milliGRAM(s) Oral three times a day  senna 2 Tablet(s) Oral at bedtime  sodium bicarbonate 650 milliGRAM(s) Oral two times a day  spironolactone 25 milliGRAM(s) Oral daily      LABS: All Labs Reviewed:                        9.2    8.3   )-----------( 387      ( 27 Aug 2019 05:37 )             27.4     08-28    137  |  99  |  102<H>  ----------------------------<  239<H>  4.1   |  19<L>  |  2.09<H>    Ca    9.8      28 Aug 2019 06:18            Blood Culture:   Urine Culture      RADIOLOGY/EKG:    ASSESSMENT AND PLAN:  72yo F admitted for management of heart failure decompensation, HTN, DM and CKD who developed left ankle and hand pain and swelling around 7 days ago, on exam with tenderness to palpation over the left ankle but no erythema and minimal edema. Risk factors for gout including age, elevated uric acid, diuretic therapy and prior episodes treated with prednisone in the same joint.    # Oligoarticular Gout/elevated uric acid/prior episodes/CKD/diuretic therapy: all these factor increase the risk for gout attacks  -Significant improvement with colchicine therapy  -Stop/discontinue Daily colchicine.  -Start colchicine 0.6 mg 3 times a week.   The patient will benefit of starting renally dose uric acid lowering therapy after discharge in 4-5 weeks.  #CHF/CAD continue above management  #DM type II is still in her blood sugar is elevated  ENDO   follow appreciated patient  sdtarted on   Prandin i mg tid continue    insulin lispro Injectable (HumaLOG) 35 Unit(s) SubCutaneous three times a day with meals  insulin NPH/regular 70/30 Injectable 85 Unit(s) SubCutaneous before breakfast  insulin NPH/regular 70/30 Injectable 75 Unit(s) SubCutaneous before dinner  r    DVT PPX:    ADVANCED DIRECTIVE:    DISPOSITION: Dc planning home  if  BS < 200

## 2019-08-29 LAB
ANION GAP SERPL CALC-SCNC: 19 MMOL/L — HIGH (ref 5–17)
BUN SERPL-MCNC: 103 MG/DL — HIGH (ref 7–23)
CALCIUM SERPL-MCNC: 10.5 MG/DL — SIGNIFICANT CHANGE UP (ref 8.4–10.5)
CHLORIDE SERPL-SCNC: 100 MMOL/L — SIGNIFICANT CHANGE UP (ref 96–108)
CO2 SERPL-SCNC: 20 MMOL/L — LOW (ref 22–31)
CREAT SERPL-MCNC: 1.94 MG/DL — HIGH (ref 0.5–1.3)
GLUCOSE BLDC GLUCOMTR-MCNC: 115 MG/DL — HIGH (ref 70–99)
GLUCOSE BLDC GLUCOMTR-MCNC: 127 MG/DL — HIGH (ref 70–99)
GLUCOSE BLDC GLUCOMTR-MCNC: 142 MG/DL — HIGH (ref 70–99)
GLUCOSE BLDC GLUCOMTR-MCNC: 252 MG/DL — HIGH (ref 70–99)
GLUCOSE BLDC GLUCOMTR-MCNC: 258 MG/DL — HIGH (ref 70–99)
GLUCOSE BLDC GLUCOMTR-MCNC: 275 MG/DL — HIGH (ref 70–99)
GLUCOSE BLDC GLUCOMTR-MCNC: 292 MG/DL — HIGH (ref 70–99)
GLUCOSE SERPL-MCNC: 131 MG/DL — HIGH (ref 70–99)
HCT VFR BLD CALC: 27.3 % — LOW (ref 34.5–45)
HGB BLD-MCNC: 9.3 G/DL — LOW (ref 11.5–15.5)
MCHC RBC-ENTMCNC: 29.1 PG — SIGNIFICANT CHANGE UP (ref 27–34)
MCHC RBC-ENTMCNC: 34.2 GM/DL — SIGNIFICANT CHANGE UP (ref 32–36)
MCV RBC AUTO: 85.1 FL — SIGNIFICANT CHANGE UP (ref 80–100)
PLATELET # BLD AUTO: 403 K/UL — HIGH (ref 150–400)
POTASSIUM SERPL-MCNC: 3.6 MMOL/L — SIGNIFICANT CHANGE UP (ref 3.5–5.3)
POTASSIUM SERPL-SCNC: 3.6 MMOL/L — SIGNIFICANT CHANGE UP (ref 3.5–5.3)
RBC # BLD: 3.21 M/UL — LOW (ref 3.8–5.2)
RBC # FLD: 16.1 % — HIGH (ref 10.3–14.5)
SODIUM SERPL-SCNC: 139 MMOL/L — SIGNIFICANT CHANGE UP (ref 135–145)
WBC # BLD: 8.2 K/UL — SIGNIFICANT CHANGE UP (ref 3.8–10.5)
WBC # FLD AUTO: 8.2 K/UL — SIGNIFICANT CHANGE UP (ref 3.8–10.5)

## 2019-08-29 RX ORDER — INSULIN NPH HUM/REG INSULIN HM 70-30/ML
70 VIAL (ML) SUBCUTANEOUS
Refills: 0 | Status: DISCONTINUED | OUTPATIENT
Start: 2019-08-29 | End: 2019-08-30

## 2019-08-29 RX ORDER — INSULIN NPH HUM/REG INSULIN HM 70-30/ML
60 VIAL (ML) SUBCUTANEOUS
Refills: 0 | Status: DISCONTINUED | OUTPATIENT
Start: 2019-08-29 | End: 2019-08-30

## 2019-08-29 RX ORDER — ALPRAZOLAM 0.25 MG
0.25 TABLET ORAL THREE TIMES A DAY
Refills: 0 | Status: DISCONTINUED | OUTPATIENT
Start: 2019-08-29 | End: 2019-09-05

## 2019-08-29 RX ADMIN — HEPARIN SODIUM 5000 UNIT(S): 5000 INJECTION INTRAVENOUS; SUBCUTANEOUS at 17:02

## 2019-08-29 RX ADMIN — Medication 50 MICROGRAM(S): at 05:52

## 2019-08-29 RX ADMIN — Medication 3 MILLIGRAM(S): at 21:43

## 2019-08-29 RX ADMIN — Medication 60 UNIT(S): at 17:02

## 2019-08-29 RX ADMIN — HEPARIN SODIUM 5000 UNIT(S): 5000 INJECTION INTRAVENOUS; SUBCUTANEOUS at 05:51

## 2019-08-29 RX ADMIN — CLOPIDOGREL BISULFATE 75 MILLIGRAM(S): 75 TABLET, FILM COATED ORAL at 12:29

## 2019-08-29 RX ADMIN — Medication 50 MILLIGRAM(S): at 23:23

## 2019-08-29 RX ADMIN — Medication 40 UNIT(S): at 12:29

## 2019-08-29 RX ADMIN — REPAGLINIDE 1 MILLIGRAM(S): 1 TABLET ORAL at 15:12

## 2019-08-29 RX ADMIN — Medication 650 MILLIGRAM(S): at 17:01

## 2019-08-29 RX ADMIN — ATORVASTATIN CALCIUM 40 MILLIGRAM(S): 80 TABLET, FILM COATED ORAL at 21:43

## 2019-08-29 RX ADMIN — ISOSORBIDE DINITRATE 30 MILLIGRAM(S): 5 TABLET ORAL at 23:23

## 2019-08-29 RX ADMIN — Medication 650 MILLIGRAM(S): at 05:52

## 2019-08-29 RX ADMIN — LIDOCAINE 1 PATCH: 4 CREAM TOPICAL at 07:00

## 2019-08-29 RX ADMIN — Medication 40 UNIT(S): at 08:06

## 2019-08-29 RX ADMIN — Medication 40 UNIT(S): at 17:02

## 2019-08-29 RX ADMIN — Medication 85 UNIT(S): at 08:07

## 2019-08-29 RX ADMIN — REPAGLINIDE 1 MILLIGRAM(S): 1 TABLET ORAL at 08:07

## 2019-08-29 RX ADMIN — POLYETHYLENE GLYCOL 3350 17 GRAM(S): 17 POWDER, FOR SOLUTION ORAL at 12:30

## 2019-08-29 RX ADMIN — Medication 1: at 21:41

## 2019-08-29 RX ADMIN — BUMETANIDE 3 MILLIGRAM(S): 0.25 INJECTION INTRAMUSCULAR; INTRAVENOUS at 17:01

## 2019-08-29 RX ADMIN — MONTELUKAST 10 MILLIGRAM(S): 4 TABLET, CHEWABLE ORAL at 12:29

## 2019-08-29 RX ADMIN — BUMETANIDE 3 MILLIGRAM(S): 0.25 INJECTION INTRAMUSCULAR; INTRAVENOUS at 05:51

## 2019-08-29 RX ADMIN — Medication 0.6 MILLIGRAM(S): at 12:29

## 2019-08-29 RX ADMIN — Medication 50 MILLIGRAM(S): at 15:12

## 2019-08-29 RX ADMIN — ISOSORBIDE DINITRATE 30 MILLIGRAM(S): 5 TABLET ORAL at 05:52

## 2019-08-29 RX ADMIN — ISOSORBIDE DINITRATE 30 MILLIGRAM(S): 5 TABLET ORAL at 17:05

## 2019-08-29 RX ADMIN — Medication 81 MILLIGRAM(S): at 12:29

## 2019-08-29 RX ADMIN — SPIRONOLACTONE 25 MILLIGRAM(S): 25 TABLET, FILM COATED ORAL at 05:51

## 2019-08-29 RX ADMIN — Medication 0.25 MILLIGRAM(S): at 00:57

## 2019-08-29 RX ADMIN — Medication 25 MILLIGRAM(S): at 05:51

## 2019-08-29 RX ADMIN — Medication 3: at 17:02

## 2019-08-29 RX ADMIN — Medication 0.25 MILLIGRAM(S): at 21:51

## 2019-08-29 RX ADMIN — Medication 1 TABLET(S): at 12:29

## 2019-08-29 RX ADMIN — Medication 0.25 MILLIGRAM(S): at 12:30

## 2019-08-29 RX ADMIN — PANTOPRAZOLE SODIUM 40 MILLIGRAM(S): 20 TABLET, DELAYED RELEASE ORAL at 05:52

## 2019-08-29 RX ADMIN — Medication 50 MILLIGRAM(S): at 05:52

## 2019-08-29 RX ADMIN — REPAGLINIDE 1 MILLIGRAM(S): 1 TABLET ORAL at 12:29

## 2019-08-29 RX ADMIN — Medication 3: at 12:29

## 2019-08-29 RX ADMIN — LIDOCAINE 1 PATCH: 4 CREAM TOPICAL at 21:44

## 2019-08-29 NOTE — PROGRESS NOTE ADULT - SUBJECTIVE AND OBJECTIVE BOX
[facility] [place] [facility]   MD GONZALEZ SHAH D.O, PA    TELEPHONE NUMBERS:  OFFICE: [phone]  DR. SANTOYO CELL: [phone]  JUSTEN FIELD CELL: [phone]  DR. RIDER CELL:  [phone]    RENAL FOLLOW UP NOTE  --------------------------------------------------------------------------------  HPI: Pt seen and examined at bedside. Pt admits to weakness.   Denies SOB, chest pain     PAST HISTORY  --------------------------------------------------------------------------------  No significant changes to PMH, PSH, FHx, SHx, unless otherwise noted    ALLERGIES & MEDICATIONS  --------------------------------------------------------------------------------  Allergies    azithromycin (Hives; Pruritus)    Intolerances      Standing Inpatient Medications  acetaminophen  IVPB .. 1000 milliGRAM(s) IV Intermittent once  aspirin enteric coated 81 milliGRAM(s) Oral daily  atorvastatin 40 milliGRAM(s) Oral at bedtime  buMETAnide 3 milliGRAM(s) Oral every 12 hours  chlorhexidine 2% Cloths 1 Application(s) Topical daily  clopidogrel Tablet 75 milliGRAM(s) Oral daily  colchicine 0.6 milliGRAM(s) Oral daily  dextrose 5%. 1000 milliLiter(s) IV Continuous <Continuous>  dextrose 50% Injectable 12.5 Gram(s) IV Push once  dextrose 50% Injectable 25 Gram(s) IV Push once  dextrose 50% Injectable 25 Gram(s) IV Push once  docusate sodium 100 milliGRAM(s) Oral two times a day  heparin  Injectable 5000 Unit(s) SubCutaneous every 12 hours  hydrALAZINE 50 milliGRAM(s) Oral every 8 hours  insulin lispro (HumaLOG) corrective regimen sliding scale   SubCutaneous three times a day before meals  insulin lispro (HumaLOG) corrective regimen sliding scale   SubCutaneous at bedtime  insulin lispro Injectable (HumaLOG) 40 Unit(s) SubCutaneous three times a day before meals  insulin NPH/regular 70/30 Injectable 85 Unit(s) SubCutaneous before breakfast  insulin NPH/regular 70/30 Injectable 75 Unit(s) SubCutaneous before dinner  isosorbide   dinitrate Tablet (ISORDIL) 30 milliGRAM(s) Oral three times a day  lactated ringers. 1000 milliLiter(s) IV Continuous <Continuous>  levothyroxine 50 MICROGram(s) Oral daily  lidocaine   Patch 1 Patch Transdermal daily  melatonin 3 milliGRAM(s) Oral at bedtime  metoprolol succinate ER 25 milliGRAM(s) Oral daily  montelukast 10 milliGRAM(s) Oral daily  Nephro-enrico 1 Tablet(s) Oral daily  pantoprazole    Tablet 40 milliGRAM(s) Oral before breakfast  polyethylene glycol 3350 17 Gram(s) Oral daily  repaglinide 1 milliGRAM(s) Oral three times a day  senna 2 Tablet(s) Oral at bedtime  sodium bicarbonate 650 milliGRAM(s) Oral two times a day  spironolactone 25 milliGRAM(s) Oral daily    PRN Inpatient Medications  acetaminophen   Tablet .. 650 milliGRAM(s) Oral every 6 hours PRN  ALBUTerol/ipratropium for Nebulization 3 milliLiter(s) Nebulizer every 6 hours PRN  ALPRAZolam 0.25 milliGRAM(s) Oral three times a day PRN  dextrose 40% Gel 15 Gram(s) Oral once PRN  glucagon  Injectable 1 milliGRAM(s) IntraMuscular once PRN  traMADol 25 milliGRAM(s) Oral every 6 hours PRN      REVIEW OF SYSTEMS  --------------------------------------------------------------------------------  General: no fever  CVS: no chest pain  RESP: no sob, no cough  ABD: no abdominal pain  : no dysuria,  MSK: no edema     VITALS/PHYSICAL EXAM  --------------------------------------------------------------------------------  T(C): 36.8 (08-29-19 @ 04:29), Max: 36.8 (08-29-19 @ 04:29)  HR: 90 (08-29-19 @ 05:47) (81 - 91)  BP: 114/68 (08-29-19 @ 05:47) (100/59 - 121/73)  RR: 18 (08-29-19 @ 04:29) (18 - 18)  SpO2: 99% (08-29-19 @ 04:29) (98% - 100%)  Wt(kg): --    08-28-19 @ 07:01  -  08-29-19 @ 07:00  --------------------------------------------------------  IN: 690 mL / OUT: 1500 mL / NET: -810 mL    Physical Exam:  	Gen: NAD  	HEENT: MMM  	Pulm: CTA B/L  	CV: S1S2  	Abd: Soft, +BS  	Ext: No LE edema B/L                      Neuro: Awake   	Skin: Warm and Dry     LABS/STUDIES  --------------------------------------------------------------------------------              9.3    8.2   >-----------<  403      [08-29-19 @ 06:13]              27.3     139  |  100  |  103  ----------------------------<  131      [08-29-19 @ 06:13]  3.6   |  20  |  1.94        Ca     10.5     [08-29-19 @ 06:13]    Creatinine Trend:  SCr 1.94 [08-29 @ 06:13]  SCr 2.09 [08-28 @ 06:18]  SCr 2.10 [08-27 @ 05:37]  SCr 2.14 [08-26 @ 05:58]  SCr 2.19 [08-25 @ 06:30]    Urinalysis - [08-22-19 @ 16:43]      Color Light Yellow / Appearance Clear / SG 1.020 / pH 6.0      Gluc >=1000 mg/dL / Ketone Negative  / Bili Negative / Urobili <2 mg/dL       Blood Negative / Protein Negative / Leuk Est Negative / Nitrite Negative      RBC  / WBC  / Hyaline  / Gran  / Sq Epi  / Non Sq Epi  / Bacteria       Iron 42, TIBC 348, %sat 12      [07-05-19 @ 22:32]  Ferritin 130      [07-05-19 @ 22:32]  .4 (Ca --)      [04-25-19 @ 05:45]   --  PTH 43.55 (Ca --)      [09-10-18 @ 07:15]   --  PTH 36.60 (Ca --)      [09-08-18 @ 03:15]   --  Vitamin D (25OH) 25.9      [05-01-19 @ 04:00]  HbA1c 7.4      [07-05-19 @ 22:32]  TSH 1.02      [08-26-19 @ 08:22]  Lipid: chol 148, , HDL 35,       [06-01-19 @ 08:00]

## 2019-08-29 NOTE — PROGRESS NOTE ADULT - SUBJECTIVE AND OBJECTIVE BOX
Chief complaint  Patient is a 73y old  Female who presents with a chief complaint of Hypoxia, SOB (29 Aug 2019 09:16)   Review of systems  Patient in bed, looks comfortable, no fever, no hypoglycemia.    Labs and Fingersticks  CAPILLARY BLOOD GLUCOSE      POCT Blood Glucose.: 252 mg/dL (29 Aug 2019 11:41)  POCT Blood Glucose.: 142 mg/dL (29 Aug 2019 07:32)  POCT Blood Glucose.: 127 mg/dL (29 Aug 2019 06:14)  POCT Blood Glucose.: 115 mg/dL (29 Aug 2019 06:06)  POCT Blood Glucose.: 275 mg/dL (29 Aug 2019 00:10)  POCT Blood Glucose.: 301 mg/dL (28 Aug 2019 21:21)  POCT Blood Glucose.: 301 mg/dL (28 Aug 2019 16:27)      Anion Gap, Serum: 19 <H> (08-29 @ 06:13)  Anion Gap, Serum: 19 <H> (08-28 @ 06:18)      Calcium, Total Serum: 10.5 (08-29 @ 06:13)  Calcium, Total Serum: 9.8 (08-28 @ 06:18)          08-29    139  |  100  |  103<H>  ----------------------------<  131<H>  3.6   |  20<L>  |  1.94<H>    Ca    10.5      29 Aug 2019 06:13                          9.3    8.2   )-----------( 403      ( 29 Aug 2019 06:13 )             27.3     Medications  MEDICATIONS  (STANDING):  acetaminophen  IVPB .. 1000 milliGRAM(s) IV Intermittent once  aspirin enteric coated 81 milliGRAM(s) Oral daily  atorvastatin 40 milliGRAM(s) Oral at bedtime  buMETAnide 3 milliGRAM(s) Oral every 12 hours  chlorhexidine 2% Cloths 1 Application(s) Topical daily  clopidogrel Tablet 75 milliGRAM(s) Oral daily  colchicine 0.6 milliGRAM(s) Oral daily  dextrose 5%. 1000 milliLiter(s) (50 mL/Hr) IV Continuous <Continuous>  dextrose 50% Injectable 12.5 Gram(s) IV Push once  dextrose 50% Injectable 25 Gram(s) IV Push once  dextrose 50% Injectable 25 Gram(s) IV Push once  docusate sodium 100 milliGRAM(s) Oral two times a day  heparin  Injectable 5000 Unit(s) SubCutaneous every 12 hours  hydrALAZINE 50 milliGRAM(s) Oral every 8 hours  insulin lispro (HumaLOG) corrective regimen sliding scale   SubCutaneous three times a day before meals  insulin lispro (HumaLOG) corrective regimen sliding scale   SubCutaneous at bedtime  insulin lispro Injectable (HumaLOG) 40 Unit(s) SubCutaneous three times a day before meals  insulin NPH/regular 70/30 Injectable 70 Unit(s) SubCutaneous before breakfast  insulin NPH/regular 70/30 Injectable 60 Unit(s) SubCutaneous before dinner  isosorbide   dinitrate Tablet (ISORDIL) 30 milliGRAM(s) Oral three times a day  lactated ringers. 1000 milliLiter(s) (75 mL/Hr) IV Continuous <Continuous>  levothyroxine 50 MICROGram(s) Oral daily  lidocaine   Patch 1 Patch Transdermal daily  melatonin 3 milliGRAM(s) Oral at bedtime  metoprolol succinate ER 25 milliGRAM(s) Oral daily  montelukast 10 milliGRAM(s) Oral daily  Nephro-enrico 1 Tablet(s) Oral daily  pantoprazole    Tablet 40 milliGRAM(s) Oral before breakfast  polyethylene glycol 3350 17 Gram(s) Oral daily  repaglinide 1 milliGRAM(s) Oral three times a day  senna 2 Tablet(s) Oral at bedtime  sodium bicarbonate 650 milliGRAM(s) Oral two times a day  spironolactone 25 milliGRAM(s) Oral daily      Physical Exam  General: Patient comfortable in bed  Vital Signs Last 12 Hrs  T(F): 98.3 (08-29-19 @ 04:29), Max: 98.3 (08-29-19 @ 04:29)  HR: 90 (08-29-19 @ 05:47) (81 - 90)  BP: 114/68 (08-29-19 @ 05:47) (100/59 - 114/68)  BP(mean): --  RR: 18 (08-29-19 @ 04:29) (18 - 18)  SpO2: 99% (08-29-19 @ 04:29) (99% - 99%)  Neck: No palpable thyroid nodules.  CVS: S1S2, No murmurs  Respiratory: No wheezing, no crepitations  GI: Abdomen soft, bowel sounds positive  Musculoskeletal:  edema lower extremities.   Skin: No skin rashes, no ecchymosis    Diagnostics Chief complaint  Patient is a 73y old  Female who presents with a chief complaint of Hypoxia, SOB (29 Aug 2019 09:16)   Review of systems  Patient in bed, looks comfortable, no fever, eating partial meals, no hypoglycemia.    Labs and Fingersticks  CAPILLARY BLOOD GLUCOSE      POCT Blood Glucose.: 252 mg/dL (29 Aug 2019 11:41)  POCT Blood Glucose.: 142 mg/dL (29 Aug 2019 07:32)  POCT Blood Glucose.: 127 mg/dL (29 Aug 2019 06:14)  POCT Blood Glucose.: 115 mg/dL (29 Aug 2019 06:06)  POCT Blood Glucose.: 275 mg/dL (29 Aug 2019 00:10)  POCT Blood Glucose.: 301 mg/dL (28 Aug 2019 21:21)  POCT Blood Glucose.: 301 mg/dL (28 Aug 2019 16:27)      Anion Gap, Serum: 19 <H> (08-29 @ 06:13)  Anion Gap, Serum: 19 <H> (08-28 @ 06:18)      Calcium, Total Serum: 10.5 (08-29 @ 06:13)  Calcium, Total Serum: 9.8 (08-28 @ 06:18)          08-29    139  |  100  |  103<H>  ----------------------------<  131<H>  3.6   |  20<L>  |  1.94<H>    Ca    10.5      29 Aug 2019 06:13                          9.3    8.2   )-----------( 403      ( 29 Aug 2019 06:13 )             27.3     Medications  MEDICATIONS  (STANDING):  acetaminophen  IVPB .. 1000 milliGRAM(s) IV Intermittent once  aspirin enteric coated 81 milliGRAM(s) Oral daily  atorvastatin 40 milliGRAM(s) Oral at bedtime  buMETAnide 3 milliGRAM(s) Oral every 12 hours  chlorhexidine 2% Cloths 1 Application(s) Topical daily  clopidogrel Tablet 75 milliGRAM(s) Oral daily  colchicine 0.6 milliGRAM(s) Oral daily  dextrose 5%. 1000 milliLiter(s) (50 mL/Hr) IV Continuous <Continuous>  dextrose 50% Injectable 12.5 Gram(s) IV Push once  dextrose 50% Injectable 25 Gram(s) IV Push once  dextrose 50% Injectable 25 Gram(s) IV Push once  docusate sodium 100 milliGRAM(s) Oral two times a day  heparin  Injectable 5000 Unit(s) SubCutaneous every 12 hours  hydrALAZINE 50 milliGRAM(s) Oral every 8 hours  insulin lispro (HumaLOG) corrective regimen sliding scale   SubCutaneous three times a day before meals  insulin lispro (HumaLOG) corrective regimen sliding scale   SubCutaneous at bedtime  insulin lispro Injectable (HumaLOG) 40 Unit(s) SubCutaneous three times a day before meals  insulin NPH/regular 70/30 Injectable 70 Unit(s) SubCutaneous before breakfast  insulin NPH/regular 70/30 Injectable 60 Unit(s) SubCutaneous before dinner  isosorbide   dinitrate Tablet (ISORDIL) 30 milliGRAM(s) Oral three times a day  lactated ringers. 1000 milliLiter(s) (75 mL/Hr) IV Continuous <Continuous>  levothyroxine 50 MICROGram(s) Oral daily  lidocaine   Patch 1 Patch Transdermal daily  melatonin 3 milliGRAM(s) Oral at bedtime  metoprolol succinate ER 25 milliGRAM(s) Oral daily  montelukast 10 milliGRAM(s) Oral daily  Nephro-enrico 1 Tablet(s) Oral daily  pantoprazole    Tablet 40 milliGRAM(s) Oral before breakfast  polyethylene glycol 3350 17 Gram(s) Oral daily  repaglinide 1 milliGRAM(s) Oral three times a day  senna 2 Tablet(s) Oral at bedtime  sodium bicarbonate 650 milliGRAM(s) Oral two times a day  spironolactone 25 milliGRAM(s) Oral daily      Physical Exam  General: Patient comfortable in bed  Vital Signs Last 12 Hrs  T(F): 98.3 (08-29-19 @ 04:29), Max: 98.3 (08-29-19 @ 04:29)  HR: 90 (08-29-19 @ 05:47) (81 - 90)  BP: 114/68 (08-29-19 @ 05:47) (100/59 - 114/68)  BP(mean): --  RR: 18 (08-29-19 @ 04:29) (18 - 18)  SpO2: 99% (08-29-19 @ 04:29) (99% - 99%)  Neck: No palpable thyroid nodules.  CVS: S1S2, No murmurs  Respiratory: No wheezing, no crepitations  GI: Abdomen soft, bowel sounds positive  Musculoskeletal:  edema lower extremities.   Skin: No skin rashes, no ecchymosis    Diagnostics

## 2019-08-29 NOTE — PROGRESS NOTE ADULT - ASSESSMENT
Pt is a 74 yo woman with PMHx of HTN, T2DM, CAD s/p CABG (LIMA to LAD, SVG to OM, SVG to PDA 2014 at [facility]), non-dilated ICM (EF 20-25%), severe mitral regurgitation s/p mitral clip (6/13/19 Dr. Newman), severe pulm HTN, CKD, hypothyroidism admitted with worsening SOB.    A/P:  MERVIN  Likely due to cardiogenic shock  Renal function remains stable today  subject to fluctuations based on Renal Perfusion.  Pt currently on Bumex 3 mg PO Q12 and spironolactone 25mg daily  - Scr currently stable   - Continue diuretic therapy per cardiology.  - Optimize glucose control  - Monitor renal function   - Avoid further nephrotoxics, NSAIDS, RCA    Hyponatremia  in the setting of hyperglycemia.   Currently stable. Monitor   Optimize glucose control    CKD stage 4:  baseline Scr 1.8-2.0. Scr stable today.   Renal function fluctuates sec to CHF  Monitor renal function at present    SOB:  in setting of HF. Improved on diuretic therapy.   Continue diuretics per cardiology    Acidosis   Sec to Renal failure  on sodium bicarb 650mg BID  Monitor serum Co2     Hypokalemia:  in the setting of diuretic therapy.   Serum potassium stable      Anemia:   Hb stable  Pt with iron deficiency anemia.

## 2019-08-29 NOTE — PROGRESS NOTE ADULT - SUBJECTIVE AND OBJECTIVE BOX
CHIEF COMPLAINT:    SUBJECTIVE:     REVIEW OF SYSTEMS:    CONSTITUTIONAL: (  )  weakness,  (  ) fevers or chills  EYES/ENT: (  )visual changes;     NECK: (  ) pain or stiffness  RESPIRATORY:   (  )cough, wheezing, hemoptysis;  (  ) shortness of breath  CARDIOVASCULAR:  (  )chest pain or palpitations  GASTROINTESTINAL:   (  )abdominal or epigastric pain.  (  ) nausea, vomiting, or hematemesis;   (   ) diarrhea or constipation.   GENITOURINARY:   (    ) dysuria, frequency or hematuria  NEUROLOGICAL:  (   ) numbness or weakness   All other review of systems is negative unless indicated above    Vital Signs Last 24 Hrs  T(C): 36.8 (29 Aug 2019 04:29), Max: 36.8 (29 Aug 2019 04:29)  T(F): 98.3 (29 Aug 2019 04:29), Max: 98.3 (29 Aug 2019 04:29)  HR: 90 (29 Aug 2019 05:47) (81 - 91)  BP: 114/68 (29 Aug 2019 05:47) (100/59 - 121/73)  BP(mean): --  RR: 18 (29 Aug 2019 04:29) (18 - 18)  SpO2: 99% (29 Aug 2019 04:29) (98% - 100%)    I&O's Summary    28 Aug 2019 07:01  -  29 Aug 2019 07:00  --------------------------------------------------------  IN: 690 mL / OUT: 1500 mL / NET: -810 mL        CAPILLARY BLOOD GLUCOSE      POCT Blood Glucose.: 142 mg/dL (29 Aug 2019 07:32)  POCT Blood Glucose.: 127 mg/dL (29 Aug 2019 06:14)  POCT Blood Glucose.: 115 mg/dL (29 Aug 2019 06:06)  POCT Blood Glucose.: 275 mg/dL (29 Aug 2019 00:10)  POCT Blood Glucose.: 301 mg/dL (28 Aug 2019 21:21)  POCT Blood Glucose.: 301 mg/dL (28 Aug 2019 16:27)  POCT Blood Glucose.: 306 mg/dL (28 Aug 2019 11:36)      PHYSICAL EXAM:    Constitutional:  (   ) NAD,   (   )awake and alert  HEENT: PERR, EOMI,    Neck: Soft and supple, No LAD, No JVD  Respiratory:  (    Breath sounds are clear bilaterally,    (   ) wheezing, rales or rhonchi  Cardiovascular:     (   )S1 and S2, regular rate and rhythm, no Murmurs, gallops or rubs  Gastrointestinal:  (   )Bowel Sounds present, soft,   (  )nontender, nondistended,    Extremities:    (  ) peripheral edema  Vascular: 2+ peripheral pulses  Neurological:    (    )A/O x 3,   (  ) focal deficits  Musculoskeletal:    (   )  normal strength b/l upper  (     ) normal  lower extremities  Skin: No rashes    MEDICATIONS:  MEDICATIONS  (STANDING):  acetaminophen  IVPB .. 1000 milliGRAM(s) IV Intermittent once  aspirin enteric coated 81 milliGRAM(s) Oral daily  atorvastatin 40 milliGRAM(s) Oral at bedtime  buMETAnide 3 milliGRAM(s) Oral every 12 hours  chlorhexidine 2% Cloths 1 Application(s) Topical daily  clopidogrel Tablet 75 milliGRAM(s) Oral daily  colchicine 0.6 milliGRAM(s) Oral daily  dextrose 5%. 1000 milliLiter(s) (50 mL/Hr) IV Continuous <Continuous>  dextrose 50% Injectable 12.5 Gram(s) IV Push once  dextrose 50% Injectable 25 Gram(s) IV Push once  dextrose 50% Injectable 25 Gram(s) IV Push once  docusate sodium 100 milliGRAM(s) Oral two times a day  heparin  Injectable 5000 Unit(s) SubCutaneous every 12 hours  hydrALAZINE 50 milliGRAM(s) Oral every 8 hours  insulin lispro (HumaLOG) corrective regimen sliding scale   SubCutaneous three times a day before meals  insulin lispro (HumaLOG) corrective regimen sliding scale   SubCutaneous at bedtime  insulin lispro Injectable (HumaLOG) 40 Unit(s) SubCutaneous three times a day before meals  insulin NPH/regular 70/30 Injectable 85 Unit(s) SubCutaneous before breakfast  insulin NPH/regular 70/30 Injectable 75 Unit(s) SubCutaneous before dinner  isosorbide   dinitrate Tablet (ISORDIL) 30 milliGRAM(s) Oral three times a day  lactated ringers. 1000 milliLiter(s) (75 mL/Hr) IV Continuous <Continuous>  levothyroxine 50 MICROGram(s) Oral daily  lidocaine   Patch 1 Patch Transdermal daily  melatonin 3 milliGRAM(s) Oral at bedtime  metoprolol succinate ER 25 milliGRAM(s) Oral daily  montelukast 10 milliGRAM(s) Oral daily  Nephro-enrico 1 Tablet(s) Oral daily  pantoprazole    Tablet 40 milliGRAM(s) Oral before breakfast  polyethylene glycol 3350 17 Gram(s) Oral daily  repaglinide 1 milliGRAM(s) Oral three times a day  senna 2 Tablet(s) Oral at bedtime  sodium bicarbonate 650 milliGRAM(s) Oral two times a day  spironolactone 25 milliGRAM(s) Oral daily      LABS: All Labs Reviewed:                        9.3    8.2   )-----------( 403      ( 29 Aug 2019 06:13 )             27.3     08-29    139  |  100  |  103<H>  ----------------------------<  131<H>  3.6   |  20<L>  |  1.94<H>    Ca    10.5      29 Aug 2019 06:13            Blood Culture:   Urine Culture      RADIOLOGY/EKG:    ASSESSMENT AND PLAN:    DVT PPX:    ADVANCED DIRECTIVE:    DISPOSITION: CHIEF COMPLAINT:  stable patient condition remained stable significant improvement in her glycemic control  SUBJECTIVE:     REVIEW OF SYSTEMS:    CONSTITUTIONAL: (x  )  weakness,  (  ) fevers or chills  EYES/ENT: (  )visual changes;     NECK: (  ) pain or stiffness  RESPIRATORY:   (  )cough, wheezing, hemoptysis;  (  ) shortness of breath  CARDIOVASCULAR:  (  )chest pain or palpitations  GASTROINTESTINAL:   (  )abdominal or epigastric pain.  (  ) nausea, vomiting, or hematemesis;   (   ) diarrhea or constipation.   GENITOURINARY:   (    ) dysuria, frequency or hematuria  NEUROLOGICAL:  (   ) numbness or weakness   All other review of systems is negative unless indicated above    Vital Signs Last 24 Hrs  T(C): 36.8 (29 Aug 2019 04:29), Max: 36.8 (29 Aug 2019 04:29)  T(F): 98.3 (29 Aug 2019 04:29), Max: 98.3 (29 Aug 2019 04:29)  HR: 90 (29 Aug 2019 05:47) (81 - 91)  BP: 114/68 (29 Aug 2019 05:47) (100/59 - 121/73)  BP(mean): --  RR: 18 (29 Aug 2019 04:29) (18 - 18)  SpO2: 99% (29 Aug 2019 04:29) (98% - 100%)    I&O's Summary    28 Aug 2019 07:01  -  29 Aug 2019 07:00  --------------------------------------------------------  IN: 690 mL / OUT: 1500 mL / NET: -810 mL        CAPILLARY BLOOD GLUCOSE      POCT Blood Glucose.: 142 mg/dL (29 Aug 2019 07:32)  POCT Blood Glucose.: 127 mg/dL (29 Aug 2019 06:14)  POCT Blood Glucose.: 115 mg/dL (29 Aug 2019 06:06)  POCT Blood Glucose.: 275 mg/dL (29 Aug 2019 00:10)  POCT Blood Glucose.: 301 mg/dL (28 Aug 2019 21:21)  POCT Blood Glucose.: 301 mg/dL (28 Aug 2019 16:27)  POCT Blood Glucose.: 306 mg/dL (28 Aug 2019 11:36)      PHYSICAL EXAM:     Neck: No palpable thyroid nodules.  CVS: S1S2, No murmurs  Respiratory: No wheezing, no crepitations  GI: Abdomen soft, bowel sounds positive  Musculoskeletal:  edema lower extremities.   Skin: No skin rashes, no ecchymosis    MEDICATIONS:  MEDICATIONS  (STANDING):  acetaminophen  IVPB .. 1000 milliGRAM(s) IV Intermittent once  aspirin enteric coated 81 milliGRAM(s) Oral daily  atorvastatin 40 milliGRAM(s) Oral at bedtime  buMETAnide 3 milliGRAM(s) Oral every 12 hours  chlorhexidine 2% Cloths 1 Application(s) Topical daily  clopidogrel Tablet 75 milliGRAM(s) Oral daily  colchicine 0.6 milliGRAM(s) Oral daily  dextrose 5%. 1000 milliLiter(s) (50 mL/Hr) IV Continuous <Continuous>  dextrose 50% Injectable 12.5 Gram(s) IV Push once  dextrose 50% Injectable 25 Gram(s) IV Push once  dextrose 50% Injectable 25 Gram(s) IV Push once  docusate sodium 100 milliGRAM(s) Oral two times a day  heparin  Injectable 5000 Unit(s) SubCutaneous every 12 hours  hydrALAZINE 50 milliGRAM(s) Oral every 8 hours  insulin lispro (HumaLOG) corrective regimen sliding scale   SubCutaneous three times a day before meals  insulin lispro (HumaLOG) corrective regimen sliding scale   SubCutaneous at bedtime  insulin lispro Injectable (HumaLOG) 40 Unit(s) SubCutaneous three times a day before meals  insulin NPH/regular 70/30 Injectable 85 Unit(s) SubCutaneous before breakfast  insulin NPH/regular 70/30 Injectable 75 Unit(s) SubCutaneous before dinner  isosorbide   dinitrate Tablet (ISORDIL) 30 milliGRAM(s) Oral three times a day  lactated ringers. 1000 milliLiter(s) (75 mL/Hr) IV Continuous <Continuous>  levothyroxine 50 MICROGram(s) Oral daily  lidocaine   Patch 1 Patch Transdermal daily  melatonin 3 milliGRAM(s) Oral at bedtime  metoprolol succinate ER 25 milliGRAM(s) Oral daily  montelukast 10 milliGRAM(s) Oral daily  Nephro-enrico 1 Tablet(s) Oral daily  pantoprazole    Tablet 40 milliGRAM(s) Oral before breakfast  polyethylene glycol 3350 17 Gram(s) Oral daily  repaglinide 1 milliGRAM(s) Oral three times a day  senna 2 Tablet(s) Oral at bedtime  sodium bicarbonate 650 milliGRAM(s) Oral two times a day  spironolactone 25 milliGRAM(s) Oral daily      LABS: All Labs Reviewed:                        9.3    8.2   )-----------( 403      ( 29 Aug 2019 06:13 )             27.3     08-29    139  |  100  |  103<H>  ----------------------------<  131<H>  3.6   |  20<L>  |  1.94<H>    Ca    10.5      29 Aug 2019 06:13            Blood Culture:   Urine Culture      RADIOLOGY/EKG:    ASSESSMENT AND PLAN:  patient condition remained stable glycemic control improving but significantly she has 115 blood sugar at 6 AM which can be  worse  more secondary to renal failure and her age she is currently on Prandin 1 mg 3 times a day we try to discuss with endocrinologist about decreasing her Prandin to twice a day 1 mg or other option is to decrease her 7030 at night to prevent her from hypoglycemic in the morning.  This case was discussed in with medical team in detail to contact endocrinologist for further assessment and management.  Family wants to take her home over all she is high risk for readmission and intubation at home she is full code at present time  DVT PPX:    ADVANCED DIRECTIVE:    DISPOSITION:

## 2019-08-29 NOTE — PROGRESS NOTE ADULT - ATTENDING COMMENTS
Assessment  DMT2: 73y Female with DM T2 with hyperglycemia after adjusting her insulin regimen yesterday, blood sugars overall down-trending though they remain elevated, she is eating partial meals and feels weak.  CAD: S/P cabg On medications, stable, monitored.  Gout: colchiceine regimen completed as per rheum recommendations.  Hypothyroidism: synthroid 50 mcg po daily, asymptomatic.  HTN: Controlled, On med.  HLD; on statin  CKD: Monitor labs/BMP      Plan:   DMT2:  Will decrease mixed insulin to NPH 70AM and 60PM, continue the remainder of patient's regimen (Humalog 40u before each meal and Prandin 1mg before each meal). Will continue monitoring FS, log, and follow up.   Patient is elderly and very weak, will likely not be able to continue regimen on her own. Spoke with primary team about D/C home versus nursing home.    CAD: S/P cabg On medications, stable, monitored.  Gout: ankle pain improved s/p colchiceine regimen Discussed with pharmacy to make U 500 insulin available, will consider switching to basal insulin BID and U500 before meals, will FU

## 2019-08-30 LAB
ANION GAP SERPL CALC-SCNC: 20 MMOL/L — HIGH (ref 5–17)
BUN SERPL-MCNC: 103 MG/DL — HIGH (ref 7–23)
CALCIUM SERPL-MCNC: 10.1 MG/DL — SIGNIFICANT CHANGE UP (ref 8.4–10.5)
CHLORIDE SERPL-SCNC: 97 MMOL/L — SIGNIFICANT CHANGE UP (ref 96–108)
CO2 SERPL-SCNC: 19 MMOL/L — LOW (ref 22–31)
CREAT SERPL-MCNC: 2.01 MG/DL — HIGH (ref 0.5–1.3)
GLUCOSE BLDC GLUCOMTR-MCNC: 206 MG/DL — HIGH (ref 70–99)
GLUCOSE BLDC GLUCOMTR-MCNC: 228 MG/DL — HIGH (ref 70–99)
GLUCOSE BLDC GLUCOMTR-MCNC: 230 MG/DL — HIGH (ref 70–99)
GLUCOSE BLDC GLUCOMTR-MCNC: 244 MG/DL — HIGH (ref 70–99)
GLUCOSE BLDC GLUCOMTR-MCNC: 256 MG/DL — HIGH (ref 70–99)
GLUCOSE BLDC GLUCOMTR-MCNC: 366 MG/DL — HIGH (ref 70–99)
GLUCOSE SERPL-MCNC: 179 MG/DL — HIGH (ref 70–99)
HCT VFR BLD CALC: 27.6 % — LOW (ref 34.5–45)
HGB BLD-MCNC: 9.3 G/DL — LOW (ref 11.5–15.5)
MCHC RBC-ENTMCNC: 28.8 PG — SIGNIFICANT CHANGE UP (ref 27–34)
MCHC RBC-ENTMCNC: 33.6 GM/DL — SIGNIFICANT CHANGE UP (ref 32–36)
MCV RBC AUTO: 85.6 FL — SIGNIFICANT CHANGE UP (ref 80–100)
PLATELET # BLD AUTO: 383 K/UL — SIGNIFICANT CHANGE UP (ref 150–400)
POTASSIUM SERPL-MCNC: 3.8 MMOL/L — SIGNIFICANT CHANGE UP (ref 3.5–5.3)
POTASSIUM SERPL-SCNC: 3.8 MMOL/L — SIGNIFICANT CHANGE UP (ref 3.5–5.3)
RBC # BLD: 3.22 M/UL — LOW (ref 3.8–5.2)
RBC # FLD: 15.9 % — HIGH (ref 10.3–14.5)
SODIUM SERPL-SCNC: 136 MMOL/L — SIGNIFICANT CHANGE UP (ref 135–145)
WBC # BLD: 7.9 K/UL — SIGNIFICANT CHANGE UP (ref 3.8–10.5)
WBC # FLD AUTO: 7.9 K/UL — SIGNIFICANT CHANGE UP (ref 3.8–10.5)

## 2019-08-30 RX ORDER — INSULIN DETEMIR 100/ML (3)
50 INSULIN PEN (ML) SUBCUTANEOUS
Refills: 0 | Status: DISCONTINUED | OUTPATIENT
Start: 2019-08-30 | End: 2019-08-31

## 2019-08-30 RX ORDER — INSULIN ASPART 100 [IU]/ML
INJECTION, SOLUTION SUBCUTANEOUS AT BEDTIME
Refills: 0 | Status: DISCONTINUED | OUTPATIENT
Start: 2019-08-30 | End: 2019-09-05

## 2019-08-30 RX ORDER — INSULIN ASPART 100 [IU]/ML
INJECTION, SOLUTION SUBCUTANEOUS
Refills: 0 | Status: DISCONTINUED | OUTPATIENT
Start: 2019-08-30 | End: 2019-09-05

## 2019-08-30 RX ORDER — INSULIN ASPART 100 [IU]/ML
45 INJECTION, SOLUTION SUBCUTANEOUS
Refills: 0 | Status: DISCONTINUED | OUTPATIENT
Start: 2019-08-30 | End: 2019-08-31

## 2019-08-30 RX ADMIN — Medication 0.25 MILLIGRAM(S): at 07:28

## 2019-08-30 RX ADMIN — INSULIN ASPART 45 UNIT(S): 100 INJECTION, SOLUTION SUBCUTANEOUS at 17:20

## 2019-08-30 RX ADMIN — ISOSORBIDE DINITRATE 30 MILLIGRAM(S): 5 TABLET ORAL at 13:49

## 2019-08-30 RX ADMIN — REPAGLINIDE 1 MILLIGRAM(S): 1 TABLET ORAL at 07:28

## 2019-08-30 RX ADMIN — ISOSORBIDE DINITRATE 30 MILLIGRAM(S): 5 TABLET ORAL at 22:58

## 2019-08-30 RX ADMIN — Medication 2: at 07:52

## 2019-08-30 RX ADMIN — Medication 3 MILLIGRAM(S): at 21:55

## 2019-08-30 RX ADMIN — Medication 650 MILLIGRAM(S): at 05:18

## 2019-08-30 RX ADMIN — HEPARIN SODIUM 5000 UNIT(S): 5000 INJECTION INTRAVENOUS; SUBCUTANEOUS at 17:22

## 2019-08-30 RX ADMIN — Medication 650 MILLIGRAM(S): at 17:21

## 2019-08-30 RX ADMIN — Medication 81 MILLIGRAM(S): at 12:04

## 2019-08-30 RX ADMIN — Medication 50 MILLIGRAM(S): at 13:48

## 2019-08-30 RX ADMIN — CLOPIDOGREL BISULFATE 75 MILLIGRAM(S): 75 TABLET, FILM COATED ORAL at 12:04

## 2019-08-30 RX ADMIN — Medication 50 UNIT(S): at 21:56

## 2019-08-30 RX ADMIN — CHLORHEXIDINE GLUCONATE 1 APPLICATION(S): 213 SOLUTION TOPICAL at 07:23

## 2019-08-30 RX ADMIN — SPIRONOLACTONE 25 MILLIGRAM(S): 25 TABLET, FILM COATED ORAL at 05:18

## 2019-08-30 RX ADMIN — MONTELUKAST 10 MILLIGRAM(S): 4 TABLET, CHEWABLE ORAL at 12:09

## 2019-08-30 RX ADMIN — INSULIN ASPART 6: 100 INJECTION, SOLUTION SUBCUTANEOUS at 21:56

## 2019-08-30 RX ADMIN — INSULIN ASPART 6: 100 INJECTION, SOLUTION SUBCUTANEOUS at 17:20

## 2019-08-30 RX ADMIN — Medication 0.25 MILLIGRAM(S): at 13:49

## 2019-08-30 RX ADMIN — Medication 50 MICROGRAM(S): at 05:18

## 2019-08-30 RX ADMIN — Medication 70 UNIT(S): at 07:52

## 2019-08-30 RX ADMIN — ISOSORBIDE DINITRATE 30 MILLIGRAM(S): 5 TABLET ORAL at 05:20

## 2019-08-30 RX ADMIN — POLYETHYLENE GLYCOL 3350 17 GRAM(S): 17 POWDER, FOR SOLUTION ORAL at 12:04

## 2019-08-30 RX ADMIN — LIDOCAINE 1 PATCH: 4 CREAM TOPICAL at 05:24

## 2019-08-30 RX ADMIN — LIDOCAINE 1 PATCH: 4 CREAM TOPICAL at 21:55

## 2019-08-30 RX ADMIN — Medication 40 UNIT(S): at 07:52

## 2019-08-30 RX ADMIN — Medication 100 MILLIGRAM(S): at 17:22

## 2019-08-30 RX ADMIN — Medication 50 MILLIGRAM(S): at 05:18

## 2019-08-30 RX ADMIN — Medication 50 MILLIGRAM(S): at 21:55

## 2019-08-30 RX ADMIN — BUMETANIDE 3 MILLIGRAM(S): 0.25 INJECTION INTRAMUSCULAR; INTRAVENOUS at 05:18

## 2019-08-30 RX ADMIN — Medication 25 MILLIGRAM(S): at 05:18

## 2019-08-30 RX ADMIN — Medication 0.25 MILLIGRAM(S): at 21:55

## 2019-08-30 RX ADMIN — INSULIN ASPART 4: 100 INJECTION, SOLUTION SUBCUTANEOUS at 12:21

## 2019-08-30 RX ADMIN — ATORVASTATIN CALCIUM 40 MILLIGRAM(S): 80 TABLET, FILM COATED ORAL at 21:55

## 2019-08-30 RX ADMIN — PANTOPRAZOLE SODIUM 40 MILLIGRAM(S): 20 TABLET, DELAYED RELEASE ORAL at 05:18

## 2019-08-30 RX ADMIN — LIDOCAINE 1 PATCH: 4 CREAM TOPICAL at 10:02

## 2019-08-30 RX ADMIN — Medication 1 TABLET(S): at 12:04

## 2019-08-30 RX ADMIN — BUMETANIDE 3 MILLIGRAM(S): 0.25 INJECTION INTRAMUSCULAR; INTRAVENOUS at 17:22

## 2019-08-30 RX ADMIN — INSULIN ASPART 45 UNIT(S): 100 INJECTION, SOLUTION SUBCUTANEOUS at 12:20

## 2019-08-30 RX ADMIN — Medication 0.6 MILLIGRAM(S): at 12:04

## 2019-08-30 NOTE — PROGRESS NOTE ADULT - ATTENDING COMMENTS
Discussed with pharmacy to make U 500 insulin available, will consider switching to basal insulin BID and U500 before meals, will FU

## 2019-08-30 NOTE — PROGRESS NOTE ADULT - SUBJECTIVE AND OBJECTIVE BOX
CHIEF COMPLAINT:  patient was seen earlier today discussed with endocrinologist about her management and better control of hyperglycemia   her  insulin will be changed as new order for better control    REVIEW OF SYSTEMS:    CONSTITUTIONAL: ( x )  weakness,  (  ) fevers or chills  EYES/ENT: (  )visual changes;     NECK: (  ) pain or stiffness  RESPIRATORY:   (  )cough, wheezing, hemoptysis;  ( x ) shortness of breath  CARDIOVASCULAR:  (  )chest pain or palpitations  GASTROINTESTINAL:   (  )abdominal or epigastric pain.  (  ) nausea, vomiting, or hematemesis;   (   ) diarrhea or constipation.   GENITOURINARY:   (    ) dysuria, frequency or hematuria  NEUROLOGICAL:  (   ) numbness or weakness   All other review of systems is negative unless indicated above    Vital Signs Last 24 Hrs  T(C): 36.4 (30 Aug 2019 11:55), Max: 36.7 (30 Aug 2019 04:31)  T(F): 97.5 (30 Aug 2019 11:55), Max: 98.1 (30 Aug 2019 04:31)  HR: 87 (30 Aug 2019 17:15) (76 - 93)  BP: 107/59 (30 Aug 2019 17:15) (97/59 - 113/80)  BP(mean): --  RR: 18 (30 Aug 2019 11:55) (18 - 18)  SpO2: 100% (30 Aug 2019 11:55) (99% - 100%)    I&O's Summary    29 Aug 2019 07:01  -  30 Aug 2019 07:00  --------------------------------------------------------  IN: 700 mL / OUT: 0 mL / NET: 700 mL    30 Aug 2019 07:01  -  30 Aug 2019 19:52  --------------------------------------------------------  IN: 500 mL / OUT: 100 mL / NET: 400 mL        CAPILLARY BLOOD GLUCOSE      POCT Blood Glucose.: 256 mg/dL (30 Aug 2019 17:09)  POCT Blood Glucose.: 228 mg/dL (30 Aug 2019 11:54)  POCT Blood Glucose.: 230 mg/dL (30 Aug 2019 07:42)  POCT Blood Glucose.: 206 mg/dL (30 Aug 2019 06:46)  POCT Blood Glucose.: 244 mg/dL (30 Aug 2019 00:52)  POCT Blood Glucose.: 258 mg/dL (29 Aug 2019 21:05)    POCT Blood Glucose.: 142 mg/dL (29 Aug 2019 07:32)  POCT Blood Glucose.: 127 mg/dL (29 Aug 2019 06:14)  POCT Blood Glucose.: 115 mg/dL (29 Aug 2019 06:06)  POCT Blood Glucose.: 275 mg/dL (29 Aug 2019 00:10)  POCT Blood Glucose.: 301 mg/dL (28 Aug 2019 21:21)  POCT Blood Glucose.: 301 mg/dL (28 Aug 2019 16:27)  POCT Blood Glucose.: 306 mg/dL (28 Aug 2019 11:36)    PHYSICAL EXAM:     Neck: No palpable thyroid nodules.  CVS: S1S2, No murmurs  Respiratory: No wheezing, no crepitations  GI: Abdomen soft, bowel sounds positive  Musculoskeletal:  edema lower extremities.   Skin: No skin rashes, no ecchymosis      MEDICATIONS:  MEDICATIONS  (STANDING):  acetaminophen  IVPB .. 1000 milliGRAM(s) IV Intermittent once  aspirin enteric coated 81 milliGRAM(s) Oral daily  atorvastatin 40 milliGRAM(s) Oral at bedtime  buMETAnide 3 milliGRAM(s) Oral every 12 hours  chlorhexidine 2% Cloths 1 Application(s) Topical daily  clopidogrel Tablet 75 milliGRAM(s) Oral daily  colchicine 0.6 milliGRAM(s) Oral daily  dextrose 5%. 1000 milliLiter(s) (50 mL/Hr) IV Continuous <Continuous>  dextrose 50% Injectable 12.5 Gram(s) IV Push once  dextrose 50% Injectable 25 Gram(s) IV Push once  dextrose 50% Injectable 25 Gram(s) IV Push once  docusate sodium 100 milliGRAM(s) Oral two times a day  heparin  Injectable 5000 Unit(s) SubCutaneous every 12 hours  hydrALAZINE 50 milliGRAM(s) Oral every 8 hours  insulin aspart (NovoLOG) corrective regimen sliding scale.   SubCutaneous three times a day before meals  insulin aspart (NovoLOG) corrective regimen sliding scale.   SubCutaneous at bedtime  insulin aspart Injectable (NovoLOG) 45 Unit(s) SubCutaneous three times a day before meals  insulin detemir injectable (LEVEMIR) 50 Unit(s) SubCutaneous two times a day  isosorbide   dinitrate Tablet (ISORDIL) 30 milliGRAM(s) Oral three times a day  lactated ringers. 1000 milliLiter(s) (75 mL/Hr) IV Continuous <Continuous>  levothyroxine 50 MICROGram(s) Oral daily  lidocaine   Patch 1 Patch Transdermal daily  melatonin 3 milliGRAM(s) Oral at bedtime  metoprolol succinate ER 25 milliGRAM(s) Oral daily  montelukast 10 milliGRAM(s) Oral daily  Nephro-enrico 1 Tablet(s) Oral daily  pantoprazole    Tablet 40 milliGRAM(s) Oral before breakfast  polyethylene glycol 3350 17 Gram(s) Oral daily  senna 2 Tablet(s) Oral at bedtime  sodium bicarbonate 650 milliGRAM(s) Oral two times a day  spironolactone 25 milliGRAM(s) Oral daily      LABS: All Labs Reviewed:                        9.3    7.9   )-----------( 383      ( 30 Aug 2019 06:45 )             27.6     08-30    136  |  97  |  103<H>  ----------------------------<  179<H>  3.8   |  19<L>  |  2.01<H>    Ca    10.1      30 Aug 2019 06:45            Blood Culture:   Urine Culture      RADIOLOGY/EKG:    ASSESSMENT AND PLAN:  patient condition remained stable glycemic control improving but significantly she has 115 blood sugar at 6 AM which can be  worse  more secondary to renal failure and her age she is currently on Prandin 1 mg 3 times a day we try to discuss with endocrinologist about medical management and better glycemic control  This case was discussed in with medical team in detail        insulin aspart (NovoLOG) corrective regimen sliding scale.   SubCutaneous three times a day before meals  insulin aspart (NovoLOG) corrective regimen sliding scale.   SubCutaneous at bedtime  insulin aspart Injectable (NovoLOG) 45 Unit(s) SubCutaneous three times a day before meals  insulin detemir injectable (LEVEMIR) 50 Unit(s) SubCutaneous two times a day  Family wants to take her home over all she is high risk for readmission and intubation at home she is full code at present time  DVT PPX:    ADVANCED DIRECTIVE:    DISPOSITION:

## 2019-08-30 NOTE — PROGRESS NOTE ADULT - ASSESSMENT
Pt is a 74 yo woman with PMHx of HTN, T2DM, CAD s/p CABG (LIMA to LAD, SVG to OM, SVG to PDA 2014 at [facility]), non-dilated ICM (EF 20-25%), severe mitral regurgitation s/p mitral clip (6/13/19 Dr. Newman), severe pulm HTN, CKD, hypothyroidism admitted with worsening SOB.    A/P:  MERVIN  Likely due to cardiogenic shock  Renal function remains stable   subject to fluctuations based on Renal Perfusion.  Pt currently on Bumex 3 mg PO Q12 and spironolactone 25mg daily  - Scr currently stable   - Continue diuretic therapy per cardiology.  - Optimize glucose control  - Monitor renal function   - Avoid further nephrotoxics, NSAIDS, RCA    Hyponatremia  in the setting of hyperglycemia.   Currently stable. Monitor   Optimize glucose control    CKD stage 4:  baseline Scr 1.8-2.0. Scr stable today.   Renal function fluctuates sec to CHF  Monitor renal function at present    SOB:  in setting of HF. Improved on diuretic therapy.   Continue diuretics per cardiology    Acidosis   Sec to Renal failure  on sodium bicarb 650mg BID  Monitor serum Co2     Hypokalemia:  in the setting of diuretic therapy.   Serum potassium stable      Anemia:   Hb stable  Pt with iron deficiency anemia.

## 2019-08-30 NOTE — PROGRESS NOTE ADULT - SUBJECTIVE AND OBJECTIVE BOX
Chief complaint  Patient is a 73y old  Female who presents with a chief complaint of Hypoxia, SOB (30 Aug 2019 08:13)   Review of systems  Patient in bed, looks comfortable, no fever, no hypoglycemia.    Labs and Fingersticks  CAPILLARY BLOOD GLUCOSE      POCT Blood Glucose.: 228 mg/dL (30 Aug 2019 11:54)  POCT Blood Glucose.: 230 mg/dL (30 Aug 2019 07:42)  POCT Blood Glucose.: 206 mg/dL (30 Aug 2019 06:46)  POCT Blood Glucose.: 244 mg/dL (30 Aug 2019 00:52)  POCT Blood Glucose.: 258 mg/dL (29 Aug 2019 21:05)  POCT Blood Glucose.: 292 mg/dL (29 Aug 2019 16:45)      Anion Gap, Serum: 20 <H> (08-30 @ 06:45)  Anion Gap, Serum: 19 <H> (08-29 @ 06:13)      Calcium, Total Serum: 10.1 (08-30 @ 06:45)  Calcium, Total Serum: 10.5 (08-29 @ 06:13)          08-30    136  |  97  |  103<H>  ----------------------------<  179<H>  3.8   |  19<L>  |  2.01<H>    Ca    10.1      30 Aug 2019 06:45                          9.3    7.9   )-----------( 383      ( 30 Aug 2019 06:45 )             27.6     Medications  MEDICATIONS  (STANDING):  acetaminophen  IVPB .. 1000 milliGRAM(s) IV Intermittent once  aspirin enteric coated 81 milliGRAM(s) Oral daily  atorvastatin 40 milliGRAM(s) Oral at bedtime  buMETAnide 3 milliGRAM(s) Oral every 12 hours  chlorhexidine 2% Cloths 1 Application(s) Topical daily  clopidogrel Tablet 75 milliGRAM(s) Oral daily  colchicine 0.6 milliGRAM(s) Oral daily  dextrose 5%. 1000 milliLiter(s) (50 mL/Hr) IV Continuous <Continuous>  dextrose 50% Injectable 12.5 Gram(s) IV Push once  dextrose 50% Injectable 25 Gram(s) IV Push once  dextrose 50% Injectable 25 Gram(s) IV Push once  docusate sodium 100 milliGRAM(s) Oral two times a day  heparin  Injectable 5000 Unit(s) SubCutaneous every 12 hours  hydrALAZINE 50 milliGRAM(s) Oral every 8 hours  insulin aspart (NovoLOG) corrective regimen sliding scale.   SubCutaneous three times a day before meals  insulin aspart (NovoLOG) corrective regimen sliding scale.   SubCutaneous at bedtime  insulin aspart Injectable (NovoLOG) 45 Unit(s) SubCutaneous three times a day before meals  insulin detemir injectable (LEVEMIR) 50 Unit(s) SubCutaneous two times a day  isosorbide   dinitrate Tablet (ISORDIL) 30 milliGRAM(s) Oral three times a day  lactated ringers. 1000 milliLiter(s) (75 mL/Hr) IV Continuous <Continuous>  levothyroxine 50 MICROGram(s) Oral daily  lidocaine   Patch 1 Patch Transdermal daily  melatonin 3 milliGRAM(s) Oral at bedtime  metoprolol succinate ER 25 milliGRAM(s) Oral daily  montelukast 10 milliGRAM(s) Oral daily  Nephro-enrico 1 Tablet(s) Oral daily  pantoprazole    Tablet 40 milliGRAM(s) Oral before breakfast  polyethylene glycol 3350 17 Gram(s) Oral daily  senna 2 Tablet(s) Oral at bedtime  sodium bicarbonate 650 milliGRAM(s) Oral two times a day  spironolactone 25 milliGRAM(s) Oral daily      Physical Exam  General: Patient comfortable in bed  Vital Signs Last 12 Hrs  T(F): 97.5 (08-30-19 @ 11:55), Max: 98.1 (08-30-19 @ 04:31)  HR: 93 (08-30-19 @ 13:16) (87 - 93)  BP: 104/52 (08-30-19 @ 13:16) (97/59 - 104/52)  BP(mean): --  RR: 18 (08-30-19 @ 11:55) (18 - 18)  SpO2: 100% (08-30-19 @ 11:55) (99% - 100%)  Neck: No palpable thyroid nodules.  CVS: S1S2, No murmurs  Respiratory: No wheezing, no crepitations  GI: Abdomen soft, bowel sounds positive  Musculoskeletal:  edema lower extremities.   Skin: No skin rashes, no ecchymosis    Diagnostics    Cortisol AM, Serum: AM Sched. Collection: 18-Aug-2019 06:00 (08-17 @ 12:14)  Cortisol AM, Serum: AM Sched. Collection: 19-Aug-2019 06:00 (08-18 @ 12:59)  Thyroid Stimulating Hormone, Serum: AM Sched. Collection: 26-Aug-2019 06:00 (08-25 @ 12:31)  Free Thyroxine, Serum: AM Sched. Collection: 26-Aug-2019 06:00 (08-25 @ 12:31)

## 2019-08-30 NOTE — PHARMACOTHERAPY INTERVENTION NOTE - COMMENTS
Spoke with Dr. Banks regarding restriction of U-500 insulin regular to insulin pump. Dr. Banks approved to try Levemir 50 units twice daily to start tonight and 45 units of Novolog instead of Humalog three times daily and moderate dose sliding scale with Novolog. Dr. Banks will monitor patient on current insulin regimen

## 2019-08-30 NOTE — PROGRESS NOTE ADULT - ASSESSMENT
Assessment  DMT2: 73y Female with resistant DM T2 with hyperglycemia on large dose mixed and rapid acting insulin, also was given a trial of Prandin to see if she make any insulin, her sugars remain elevated. She is feeling weak. Discussed with pharmacy if we can switch to U 500minsulin since she needs multiple injections and needs DC planing, pharmacy did not approve SubQ U500 but only for insulin pump.  CAD: S/P cabg On medications, stable, monitored.  Gout: colchiceine regimen completed as per rheum recommendations.  Hypothyroidism: synthroid 50 mcg po daily, asymptomatic.  HTN: Controlled, On med.  HLD; on statin  CKD: Monitor labs/BMP      Plan:   DMT2:  Will switch to Levemir 50u BID and Novolog 45u before meal, continue correction scale coverage. Will continue monitoring FS, log, and follow up. Recommended to patient and primary team to DC her to SNF/rehab.     .     CAD: S/P cabg On medications, stable, monitored.  Gout: ankle pain improved s/p colchiceine regimen.  Hypothyroidism: Continue synthroid 50 mcg po daily, asymptomatic. FU TFTs  HTN: Continue treatment, monitoring, Primary team FU  HLD; on statin  CKD: Continue monitoring labs, renal FU

## 2019-08-30 NOTE — PROGRESS NOTE ADULT - SUBJECTIVE AND OBJECTIVE BOX
[facility] [place] [facility]PHROLOGY PRACTICE   MD GONZALEZ SHAH D.O, PA    TELEPHONE NUMBERS:  OFFICE: [phone]  DR. SANTOYO CELL: [phone]  JUSTEN FIELD CELL: [phone]  DR. RIDER CELL:  [phone]    RENAL FOLLOW UP NOTE  --------------------------------------------------------------------------------  HPI: Pt seen and examined at bedside. Admits to weakness  Denies SOB, chest pain     PAST HISTORY  --------------------------------------------------------------------------------  No significant changes to PMH, PSH, FHx, SHx, unless otherwise noted    ALLERGIES & MEDICATIONS  --------------------------------------------------------------------------------  Allergies    azithromycin (Hives; Pruritus)    Intolerances      Standing Inpatient Medications  acetaminophen  IVPB .. 1000 milliGRAM(s) IV Intermittent once  aspirin enteric coated 81 milliGRAM(s) Oral daily  atorvastatin 40 milliGRAM(s) Oral at bedtime  buMETAnide 3 milliGRAM(s) Oral every 12 hours  chlorhexidine 2% Cloths 1 Application(s) Topical daily  clopidogrel Tablet 75 milliGRAM(s) Oral daily  colchicine 0.6 milliGRAM(s) Oral daily  dextrose 5%. 1000 milliLiter(s) IV Continuous <Continuous>  dextrose 50% Injectable 12.5 Gram(s) IV Push once  dextrose 50% Injectable 25 Gram(s) IV Push once  dextrose 50% Injectable 25 Gram(s) IV Push once  docusate sodium 100 milliGRAM(s) Oral two times a day  heparin  Injectable 5000 Unit(s) SubCutaneous every 12 hours  hydrALAZINE 50 milliGRAM(s) Oral every 8 hours  insulin lispro (HumaLOG) corrective regimen sliding scale   SubCutaneous three times a day before meals  insulin lispro (HumaLOG) corrective regimen sliding scale   SubCutaneous at bedtime  insulin lispro Injectable (HumaLOG) 40 Unit(s) SubCutaneous three times a day before meals  insulin NPH/regular 70/30 Injectable 70 Unit(s) SubCutaneous before breakfast  insulin NPH/regular 70/30 Injectable 60 Unit(s) SubCutaneous before dinner  isosorbide   dinitrate Tablet (ISORDIL) 30 milliGRAM(s) Oral three times a day  lactated ringers. 1000 milliLiter(s) IV Continuous <Continuous>  levothyroxine 50 MICROGram(s) Oral daily  lidocaine   Patch 1 Patch Transdermal daily  melatonin 3 milliGRAM(s) Oral at bedtime  metoprolol succinate ER 25 milliGRAM(s) Oral daily  montelukast 10 milliGRAM(s) Oral daily  Nephro-enrico 1 Tablet(s) Oral daily  pantoprazole    Tablet 40 milliGRAM(s) Oral before breakfast  polyethylene glycol 3350 17 Gram(s) Oral daily  repaglinide 1 milliGRAM(s) Oral three times a day  senna 2 Tablet(s) Oral at bedtime  sodium bicarbonate 650 milliGRAM(s) Oral two times a day  spironolactone 25 milliGRAM(s) Oral daily    PRN Inpatient Medications  acetaminophen   Tablet .. 650 milliGRAM(s) Oral every 6 hours PRN  ALBUTerol/ipratropium for Nebulization 3 milliLiter(s) Nebulizer every 6 hours PRN  ALPRAZolam 0.25 milliGRAM(s) Oral three times a day PRN  dextrose 40% Gel 15 Gram(s) Oral once PRN  glucagon  Injectable 1 milliGRAM(s) IntraMuscular once PRN  traMADol 25 milliGRAM(s) Oral every 6 hours PRN      REVIEW OF SYSTEMS  --------------------------------------------------------------------------------  General: no fever  CVS: no chest pain  RESP: no sob, no cough  ABD: no abdominal pain  : no dysuria,  MSK: no edema     VITALS/PHYSICAL EXAM  --------------------------------------------------------------------------------  T(C): 36.7 (08-30-19 @ 04:31), Max: 36.7 (08-30-19 @ 04:31)  HR: 87 (08-30-19 @ 04:31) (76 - 94)  BP: 102/62 (08-30-19 @ 05:17) (97/59 - 113/80)  RR: 18 (08-30-19 @ 04:31) (17 - 18)  SpO2: 99% (08-30-19 @ 04:31) (99% - 99%)  Wt(kg): --        08-29-19 @ 07:01  -  08-30-19 @ 07:00  --------------------------------------------------------  IN: 700 mL / OUT: 0 mL / NET: 700 mL    Physical Exam:  	Gen: NAD  	HEENT: MMM  	Pulm: CTA B/L  	CV: S1S2  	Abd: Soft, +BS  	Ext: No LE edema B/L                      Neuro: Awake   	Skin: Warm and Dry     LABS/STUDIES  --------------------------------------------------------------------------------              9.3    7.9   >-----------<  383      [08-30-19 @ 06:45]              27.6     136  |  97  |  103  ----------------------------<  179      [08-30-19 @ 06:45]  3.8   |  19  |  2.01        Ca     10.1     [08-30-19 @ 06:45]    Creatinine Trend:  SCr 2.01 [08-30 @ 06:45]  SCr 1.94 [08-29 @ 06:13]  SCr 2.09 [08-28 @ 06:18]  SCr 2.10 [08-27 @ 05:37]  SCr 2.14 [08-26 @ 05:58]    Urinalysis - [08-22-19 @ 16:43]      Color Light Yellow / Appearance Clear / SG 1.020 / pH 6.0      Gluc >=1000 mg/dL / Ketone Negative  / Bili Negative / Urobili <2 mg/dL       Blood Negative / Protein Negative / Leuk Est Negative / Nitrite Negative      RBC  / WBC  / Hyaline  / Gran  / Sq Epi  / Non Sq Epi  / Bacteria       Iron 42, TIBC 348, %sat 12      [07-05-19 @ 22:32]  Ferritin 130      [07-05-19 @ 22:32]  .4 (Ca --)      [04-25-19 @ 05:45]   --  PTH 43.55 (Ca --)      [09-10-18 @ 07:15]   --  PTH 36.60 (Ca --)      [09-08-18 @ 03:15]   --  Vitamin D (25OH) 25.9      [05-01-19 @ 04:00]  HbA1c 7.4      [07-05-19 @ 22:32]  TSH 1.02      [08-26-19 @ 08:22]  Lipid: chol 148, , HDL 35,       [06-01-19 @ 08:00]

## 2019-08-31 LAB
ANION GAP SERPL CALC-SCNC: 20 MMOL/L — HIGH (ref 5–17)
BUN SERPL-MCNC: 102 MG/DL — HIGH (ref 7–23)
CALCIUM SERPL-MCNC: 9.8 MG/DL — SIGNIFICANT CHANGE UP (ref 8.4–10.5)
CHLORIDE SERPL-SCNC: 97 MMOL/L — SIGNIFICANT CHANGE UP (ref 96–108)
CO2 SERPL-SCNC: 18 MMOL/L — LOW (ref 22–31)
CREAT SERPL-MCNC: 2.06 MG/DL — HIGH (ref 0.5–1.3)
GLUCOSE BLDC GLUCOMTR-MCNC: 232 MG/DL — HIGH (ref 70–99)
GLUCOSE BLDC GLUCOMTR-MCNC: 234 MG/DL — HIGH (ref 70–99)
GLUCOSE BLDC GLUCOMTR-MCNC: 283 MG/DL — HIGH (ref 70–99)
GLUCOSE BLDC GLUCOMTR-MCNC: 286 MG/DL — HIGH (ref 70–99)
GLUCOSE BLDC GLUCOMTR-MCNC: 352 MG/DL — HIGH (ref 70–99)
GLUCOSE SERPL-MCNC: 197 MG/DL — HIGH (ref 70–99)
HCT VFR BLD CALC: 26.3 % — LOW (ref 34.5–45)
HGB BLD-MCNC: 8.9 G/DL — LOW (ref 11.5–15.5)
MCHC RBC-ENTMCNC: 29.4 PG — SIGNIFICANT CHANGE UP (ref 27–34)
MCHC RBC-ENTMCNC: 34 GM/DL — SIGNIFICANT CHANGE UP (ref 32–36)
MCV RBC AUTO: 86.4 FL — SIGNIFICANT CHANGE UP (ref 80–100)
PLATELET # BLD AUTO: 396 K/UL — SIGNIFICANT CHANGE UP (ref 150–400)
POTASSIUM SERPL-MCNC: 3.6 MMOL/L — SIGNIFICANT CHANGE UP (ref 3.5–5.3)
POTASSIUM SERPL-SCNC: 3.6 MMOL/L — SIGNIFICANT CHANGE UP (ref 3.5–5.3)
RBC # BLD: 3.04 M/UL — LOW (ref 3.8–5.2)
RBC # FLD: 16.1 % — HIGH (ref 10.3–14.5)
SODIUM SERPL-SCNC: 135 MMOL/L — SIGNIFICANT CHANGE UP (ref 135–145)
WBC # BLD: 8.1 K/UL — SIGNIFICANT CHANGE UP (ref 3.8–10.5)
WBC # FLD AUTO: 8.1 K/UL — SIGNIFICANT CHANGE UP (ref 3.8–10.5)

## 2019-08-31 RX ORDER — INSULIN DETEMIR 100/ML (3)
56 INSULIN PEN (ML) SUBCUTANEOUS
Refills: 0 | Status: DISCONTINUED | OUTPATIENT
Start: 2019-08-31 | End: 2019-09-01

## 2019-08-31 RX ORDER — INSULIN LISPRO 100/ML
48 VIAL (ML) SUBCUTANEOUS
Refills: 0 | Status: DISCONTINUED | OUTPATIENT
Start: 2019-08-31 | End: 2019-09-01

## 2019-08-31 RX ADMIN — LIDOCAINE 1 PATCH: 4 CREAM TOPICAL at 06:03

## 2019-08-31 RX ADMIN — INSULIN ASPART 45 UNIT(S): 100 INJECTION, SOLUTION SUBCUTANEOUS at 08:10

## 2019-08-31 RX ADMIN — INSULIN ASPART 10: 100 INJECTION, SOLUTION SUBCUTANEOUS at 17:22

## 2019-08-31 RX ADMIN — LIDOCAINE 1 PATCH: 4 CREAM TOPICAL at 21:38

## 2019-08-31 RX ADMIN — BUMETANIDE 3 MILLIGRAM(S): 0.25 INJECTION INTRAMUSCULAR; INTRAVENOUS at 05:32

## 2019-08-31 RX ADMIN — MONTELUKAST 10 MILLIGRAM(S): 4 TABLET, CHEWABLE ORAL at 12:08

## 2019-08-31 RX ADMIN — INSULIN ASPART 2: 100 INJECTION, SOLUTION SUBCUTANEOUS at 21:41

## 2019-08-31 RX ADMIN — Medication 0.6 MILLIGRAM(S): at 12:08

## 2019-08-31 RX ADMIN — PANTOPRAZOLE SODIUM 40 MILLIGRAM(S): 20 TABLET, DELAYED RELEASE ORAL at 05:32

## 2019-08-31 RX ADMIN — Medication 48 UNIT(S): at 17:24

## 2019-08-31 RX ADMIN — ISOSORBIDE DINITRATE 30 MILLIGRAM(S): 5 TABLET ORAL at 14:27

## 2019-08-31 RX ADMIN — Medication 0.25 MILLIGRAM(S): at 08:12

## 2019-08-31 RX ADMIN — Medication 50 MILLIGRAM(S): at 14:27

## 2019-08-31 RX ADMIN — Medication 25 MILLIGRAM(S): at 05:32

## 2019-08-31 RX ADMIN — Medication 650 MILLIGRAM(S): at 05:32

## 2019-08-31 RX ADMIN — Medication 1 TABLET(S): at 12:08

## 2019-08-31 RX ADMIN — Medication 56 UNIT(S): at 21:41

## 2019-08-31 RX ADMIN — ISOSORBIDE DINITRATE 30 MILLIGRAM(S): 5 TABLET ORAL at 05:32

## 2019-08-31 RX ADMIN — Medication 50 MILLIGRAM(S): at 05:32

## 2019-08-31 RX ADMIN — CHLORHEXIDINE GLUCONATE 1 APPLICATION(S): 213 SOLUTION TOPICAL at 21:39

## 2019-08-31 RX ADMIN — Medication 81 MILLIGRAM(S): at 12:08

## 2019-08-31 RX ADMIN — HEPARIN SODIUM 5000 UNIT(S): 5000 INJECTION INTRAVENOUS; SUBCUTANEOUS at 17:50

## 2019-08-31 RX ADMIN — LIDOCAINE 1 PATCH: 4 CREAM TOPICAL at 09:42

## 2019-08-31 RX ADMIN — INSULIN ASPART 6: 100 INJECTION, SOLUTION SUBCUTANEOUS at 12:07

## 2019-08-31 RX ADMIN — Medication 3 MILLIGRAM(S): at 21:40

## 2019-08-31 RX ADMIN — SPIRONOLACTONE 25 MILLIGRAM(S): 25 TABLET, FILM COATED ORAL at 05:32

## 2019-08-31 RX ADMIN — Medication 100 MILLIGRAM(S): at 05:32

## 2019-08-31 RX ADMIN — POLYETHYLENE GLYCOL 3350 17 GRAM(S): 17 POWDER, FOR SOLUTION ORAL at 12:08

## 2019-08-31 RX ADMIN — ATORVASTATIN CALCIUM 40 MILLIGRAM(S): 80 TABLET, FILM COATED ORAL at 21:40

## 2019-08-31 RX ADMIN — Medication 50 MICROGRAM(S): at 05:32

## 2019-08-31 RX ADMIN — CLOPIDOGREL BISULFATE 75 MILLIGRAM(S): 75 TABLET, FILM COATED ORAL at 12:08

## 2019-08-31 RX ADMIN — Medication 650 MILLIGRAM(S): at 17:50

## 2019-08-31 RX ADMIN — CHLORHEXIDINE GLUCONATE 1 APPLICATION(S): 213 SOLUTION TOPICAL at 08:03

## 2019-08-31 RX ADMIN — INSULIN ASPART 4: 100 INJECTION, SOLUTION SUBCUTANEOUS at 08:10

## 2019-08-31 RX ADMIN — Medication 50 MILLIGRAM(S): at 21:40

## 2019-08-31 RX ADMIN — INSULIN ASPART 45 UNIT(S): 100 INJECTION, SOLUTION SUBCUTANEOUS at 12:07

## 2019-08-31 RX ADMIN — Medication 50 UNIT(S): at 09:39

## 2019-08-31 RX ADMIN — ISOSORBIDE DINITRATE 30 MILLIGRAM(S): 5 TABLET ORAL at 21:40

## 2019-08-31 NOTE — PROGRESS NOTE ADULT - SUBJECTIVE AND OBJECTIVE BOX
Dr. Muhammad  Office [phone]  Cell [phone]  [facility] FIELD  Cell [phone]      Patient is a 73y old  Female who presents with a chief complaint of Hypoxia, SOB (31 Aug 2019 15:29)      Patient seen and examined at bedside. No chest pain/sob    VITALS:  T(F): 97.8 (08-31-19 @ 11:24), Max: 98.3 (08-30-19 @ 20:10)  HR: 85 (08-31-19 @ 14:26)  BP: 102/64 (08-31-19 @ 14:26)  RR: 18 (08-31-19 @ 11:24)  SpO2: 100% (08-31-19 @ 11:24)  Wt(kg): --    08-30 @ 07:01  -  08-31 @ 07:00  --------------------------------------------------------  IN: 500 mL / OUT: 700 mL / NET: -200 mL    08-31 @ 07:01  -  08-31 @ 16:13  --------------------------------------------------------  IN: 360 mL / OUT: 225 mL / NET: 135 mL          PHYSICAL EXAM:  Constitutional: NAD  Neck: No JVD  Respiratory: CTAB, no wheezes, rales or rhonchi  Cardiovascular: S1, S2, RRR  Gastrointestinal: BS+, soft, NT/ND  Extremities: No peripheral edema    Hospital Medications:   MEDICATIONS  (STANDING):  acetaminophen  IVPB .. 1000 milliGRAM(s) IV Intermittent once  aspirin enteric coated 81 milliGRAM(s) Oral daily  atorvastatin 40 milliGRAM(s) Oral at bedtime  buMETAnide 3 milliGRAM(s) Oral every 12 hours  chlorhexidine 2% Cloths 1 Application(s) Topical daily  clopidogrel Tablet 75 milliGRAM(s) Oral daily  colchicine 0.6 milliGRAM(s) Oral daily  dextrose 5%. 1000 milliLiter(s) (50 mL/Hr) IV Continuous <Continuous>  dextrose 50% Injectable 12.5 Gram(s) IV Push once  dextrose 50% Injectable 25 Gram(s) IV Push once  dextrose 50% Injectable 25 Gram(s) IV Push once  docusate sodium 100 milliGRAM(s) Oral two times a day  heparin  Injectable 5000 Unit(s) SubCutaneous every 12 hours  hydrALAZINE 50 milliGRAM(s) Oral every 8 hours  insulin aspart (NovoLOG) corrective regimen sliding scale.   SubCutaneous three times a day before meals  insulin aspart (NovoLOG) corrective regimen sliding scale.   SubCutaneous at bedtime  insulin detemir injectable (LEVEMIR) 56 Unit(s) SubCutaneous two times a day  insulin lispro Injectable (HumaLOG) 48 Unit(s) SubCutaneous three times a day before meals  isosorbide   dinitrate Tablet (ISORDIL) 30 milliGRAM(s) Oral three times a day  lactated ringers. 1000 milliLiter(s) (75 mL/Hr) IV Continuous <Continuous>  levothyroxine 50 MICROGram(s) Oral daily  lidocaine   Patch 1 Patch Transdermal daily  melatonin 3 milliGRAM(s) Oral at bedtime  metoprolol succinate ER 25 milliGRAM(s) Oral daily  montelukast 10 milliGRAM(s) Oral daily  Nephro-enrico 1 Tablet(s) Oral daily  pantoprazole    Tablet 40 milliGRAM(s) Oral before breakfast  polyethylene glycol 3350 17 Gram(s) Oral daily  senna 2 Tablet(s) Oral at bedtime  sodium bicarbonate 650 milliGRAM(s) Oral two times a day  spironolactone 25 milliGRAM(s) Oral daily      LABS:  08-31    135  |  97  |  102<H>  ----------------------------<  197<H>  3.6   |  18<L>  |  2.06<H>    Ca    9.8      31 Aug 2019 08:14      Creatinine Trend: 2.06 <--, 2.01 <--, 1.94 <--, 2.09 <--, 2.10 <--, 2.14 <--, 2.19 <--                                8.9    8.1   )-----------( 396      ( 31 Aug 2019 08:14 )             26.3     Urine Studies:  Urinalysis - [08-22-19 @ 16:43]      Color Light Yellow / Appearance Clear / SG 1.020 / pH 6.0      Gluc >=1000 mg/dL / Ketone Negative  / Bili Negative / Urobili <2 mg/dL       Blood Negative / Protein Negative / Leuk Est Negative / Nitrite Negative      RBC  / WBC  / Hyaline  / Gran  / Sq Epi  / Non Sq Epi  / Bacteria       Iron 42, TIBC 348, %sat 12      [07-05-19 @ 22:32]  Ferritin 130      [07-05-19 @ 22:32]  .4 (Ca --)      [04-25-19 @ 05:45]   --  PTH 43.55 (Ca --)      [09-10-18 @ 07:15]   --  PTH 36.60 (Ca --)      [09-08-18 @ 03:15]   --  Vitamin D (25OH) 25.9      [05-01-19 @ 04:00]  HbA1c 7.4      [07-05-19 @ 22:32]  TSH 1.02      [08-26-19 @ 08:22]  Lipid: chol 148, , HDL 35,       [06-01-19 @ 08:00]        RADIOLOGY & ADDITIONAL STUDIES:

## 2019-08-31 NOTE — PROGRESS NOTE ADULT - SUBJECTIVE AND OBJECTIVE BOX
CHIEF COMPLAINT:    SUBJECTIVE:     REVIEW OF SYSTEMS:    CONSTITUTIONAL: (  )  weakness,  (  ) fevers or chills  EYES/ENT: (  )visual changes;     NECK: (  ) pain or stiffness  RESPIRATORY:   (  )cough, wheezing, hemoptysis;  (  ) shortness of breath  CARDIOVASCULAR:  (  )chest pain or palpitations  GASTROINTESTINAL:   (  )abdominal or epigastric pain.  (  ) nausea, vomiting, or hematemesis;   (   ) diarrhea or constipation.   GENITOURINARY:   (    ) dysuria, frequency or hematuria  NEUROLOGICAL:  (   ) numbness or weakness   All other review of systems is negative unless indicated above    Vital Signs Last 24 Hrs  T(C): 36.6 (31 Aug 2019 11:24), Max: 36.8 (30 Aug 2019 20:10)  T(F): 97.8 (31 Aug 2019 11:24), Max: 98.3 (30 Aug 2019 20:10)  HR: 85 (31 Aug 2019 14:26) (83 - 88)  BP: 102/64 (31 Aug 2019 14:26) (96/58 - 107/59)  BP(mean): --  RR: 18 (31 Aug 2019 11:24) (18 - 20)  SpO2: 100% (31 Aug 2019 11:24) (98% - 100%)    I&O's Summary    30 Aug 2019 07:01  -  31 Aug 2019 07:00  --------------------------------------------------------  IN: 500 mL / OUT: 700 mL / NET: -200 mL    31 Aug 2019 07:01  -  31 Aug 2019 17:10  --------------------------------------------------------  IN: 360 mL / OUT: 525 mL / NET: -165 mL        CAPILLARY BLOOD GLUCOSE      POCT Blood Glucose.: 352 mg/dL (31 Aug 2019 16:27)  POCT Blood Glucose.: 286 mg/dL (31 Aug 2019 11:50)  POCT Blood Glucose.: 234 mg/dL (31 Aug 2019 07:59)  POCT Blood Glucose.: 232 mg/dL (31 Aug 2019 03:11)  POCT Blood Glucose.: 366 mg/dL (30 Aug 2019 21:39)      PHYSICAL EXAM:    Constitutional:  (   ) NAD,   (   )awake and alert  HEENT: PERR, EOMI,    Neck: Soft and supple, No LAD, No JVD  Respiratory:  (    Breath sounds are clear bilaterally,    (   ) wheezing, rales or rhonchi  Cardiovascular:     (   )S1 and S2, regular rate and rhythm, no Murmurs, gallops or rubs  Gastrointestinal:  (   )Bowel Sounds present, soft,   (  )nontender, nondistended,    Extremities:    (  ) peripheral edema  Vascular: 2+ peripheral pulses  Neurological:    (    )A/O x 3,   (  ) focal deficits  Musculoskeletal:    (   )  normal strength b/l upper  (     ) normal  lower extremities  Skin: No rashes    MEDICATIONS:  MEDICATIONS  (STANDING):  acetaminophen  IVPB .. 1000 milliGRAM(s) IV Intermittent once  aspirin enteric coated 81 milliGRAM(s) Oral daily  atorvastatin 40 milliGRAM(s) Oral at bedtime  buMETAnide 3 milliGRAM(s) Oral every 12 hours  chlorhexidine 2% Cloths 1 Application(s) Topical daily  clopidogrel Tablet 75 milliGRAM(s) Oral daily  colchicine 0.6 milliGRAM(s) Oral daily  dextrose 5%. 1000 milliLiter(s) (50 mL/Hr) IV Continuous <Continuous>  dextrose 50% Injectable 12.5 Gram(s) IV Push once  dextrose 50% Injectable 25 Gram(s) IV Push once  dextrose 50% Injectable 25 Gram(s) IV Push once  docusate sodium 100 milliGRAM(s) Oral two times a day  heparin  Injectable 5000 Unit(s) SubCutaneous every 12 hours  hydrALAZINE 50 milliGRAM(s) Oral every 8 hours  insulin aspart (NovoLOG) corrective regimen sliding scale.   SubCutaneous three times a day before meals  insulin aspart (NovoLOG) corrective regimen sliding scale.   SubCutaneous at bedtime  insulin detemir injectable (LEVEMIR) 56 Unit(s) SubCutaneous two times a day  insulin lispro Injectable (HumaLOG) 48 Unit(s) SubCutaneous three times a day before meals  isosorbide   dinitrate Tablet (ISORDIL) 30 milliGRAM(s) Oral three times a day  lactated ringers. 1000 milliLiter(s) (75 mL/Hr) IV Continuous <Continuous>  levothyroxine 50 MICROGram(s) Oral daily  lidocaine   Patch 1 Patch Transdermal daily  melatonin 3 milliGRAM(s) Oral at bedtime  metoprolol succinate ER 25 milliGRAM(s) Oral daily  montelukast 10 milliGRAM(s) Oral daily  Nephro-enrico 1 Tablet(s) Oral daily  pantoprazole    Tablet 40 milliGRAM(s) Oral before breakfast  polyethylene glycol 3350 17 Gram(s) Oral daily  senna 2 Tablet(s) Oral at bedtime  sodium bicarbonate 650 milliGRAM(s) Oral two times a day  spironolactone 25 milliGRAM(s) Oral daily      LABS: All Labs Reviewed:                        8.9    8.1   )-----------( 396      ( 31 Aug 2019 08:14 )             26.3     08-31    135  |  97  |  102<H>  ----------------------------<  197<H>  3.6   |  18<L>  |  2.06<H>    Ca    9.8      31 Aug 2019 08:14            Blood Culture:   Urine Culture      RADIOLOGY/EKG:    ASSESSMENT AND PLAN:    DVT PPX:    ADVANCED DIRECTIVE:    DISPOSITION: CHIEF COMPLAINT:  patient was seen  r today discussed with endocrinologist about her management and better control of hyperglycemia   her  insulin   was supposed to change Carmen 500 but unfortunately the pharmacy did not approve it changed as new order for better control  SUBJECTIVE:     REVIEW OF SYSTEMS:    CONSTITUTIONAL: ( x )  weakness,  (  ) fevers or chills  EYES/ENT: (  )visual changes;     NECK: (  ) pain or stiffness  RESPIRATORY:   (  )cough, wheezing, hemoptysis;  (  ) shortness of breath  CARDIOVASCULAR:  (  )chest pain or palpitations  GASTROINTESTINAL:   (  )abdominal or epigastric pain.  (  ) nausea, vomiting, or hematemesis;   (   ) diarrhea or constipation.   GENITOURINARY:   (    ) dysuria, frequency or hematuria  NEUROLOGICAL:  (   ) numbness or weakness   All other review of systems is negative unless indicated above    Vital Signs Last 24 Hrs  T(C): 36.6 (31 Aug 2019 11:24), Max: 36.8 (30 Aug 2019 20:10)  T(F): 97.8 (31 Aug 2019 11:24), Max: 98.3 (30 Aug 2019 20:10)  HR: 85 (31 Aug 2019 14:26) (83 - 88)  BP: 102/64 (31 Aug 2019 14:26) (96/58 - 107/59)  BP(mean): --  RR: 18 (31 Aug 2019 11:24) (18 - 20)  SpO2: 100% (31 Aug 2019 11:24) (98% - 100%)    I&O's Summary    30 Aug 2019 07:01  -  31 Aug 2019 07:00  --------------------------------------------------------  IN: 500 mL / OUT: 700 mL / NET: -200 mL    31 Aug 2019 07:01  -  31 Aug 2019 17:10  --------------------------------------------------------  IN: 360 mL / OUT: 525 mL / NET: -165 mL        CAPILLARY BLOOD GLUCOSE      POCT Blood Glucose.: 352 mg/dL (31 Aug 2019 16:27)  POCT Blood Glucose.: 286 mg/dL (31 Aug 2019 11:50)  POCT Blood Glucose.: 234 mg/dL (31 Aug 2019 07:59)  POCT Blood Glucose.: 232 mg/dL (31 Aug 2019 03:11)  POCT Blood Glucose.: 366 mg/dL (30 Aug 2019 21:39)    POCT Blood Glucose.: 256 mg/dL (30 Aug 2019 17:09)  POCT Blood Glucose.: 228 mg/dL (30 Aug 2019 11:54)  POCT Blood Glucose.: 230 mg/dL (30 Aug 2019 07:42)  POCT Blood Glucose.: 206 mg/dL (30 Aug 2019 06:46)  POCT Blood Glucose.: 244 mg/dL (30 Aug 2019 00:52)  POCT Blood Glucose.: 258 mg/dL (29 Aug 2019 21:05)    POCT Blood Glucose.: 142 mg/dL (29 Aug 2019 07:32)  POCT Blood Glucose.: 127 mg/dL (29 Aug 2019 06:14)  POCT Blood Glucose.: 115 mg/dL (29 Aug 2019 06:06)  POCT Blood Glucose.: 275 mg/dL (29 Aug 2019 00:10)  POCT Blood Glucose.: 301 mg/dL (28 Aug 2019 21:21)  POCT Blood Glucose.: 301 mg/dL (28 Aug 2019 16:27)  POCT Blood Glucose.: 306 mg/dL (28 Aug 2019 11:36)  PHYSICAL EXAM:     Neck: No palpable thyroid nodules.  CVS: S1S2, No murmurs  Respiratory: No wheezing, no crepitations  GI: Abdomen soft, bowel sounds positive  Musculoskeletal:  edema lower extremities.   Skin: No skin rashes, no ecchymosis      MEDICATIONS:  MEDICATIONS  (STANDING):  acetaminophen  IVPB .. 1000 milliGRAM(s) IV Intermittent once  aspirin enteric coated 81 milliGRAM(s) Oral daily  atorvastatin 40 milliGRAM(s) Oral at bedtime  buMETAnide 3 milliGRAM(s) Oral every 12 hours  chlorhexidine 2% Cloths 1 Application(s) Topical daily  clopidogrel Tablet 75 milliGRAM(s) Oral daily  colchicine 0.6 milliGRAM(s) Oral daily  dextrose 5%. 1000 milliLiter(s) (50 mL/Hr) IV Continuous <Continuous>  dextrose 50% Injectable 12.5 Gram(s) IV Push once  dextrose 50% Injectable 25 Gram(s) IV Push once  dextrose 50% Injectable 25 Gram(s) IV Push once  docusate sodium 100 milliGRAM(s) Oral two times a day  heparin  Injectable 5000 Unit(s) SubCutaneous every 12 hours  hydrALAZINE 50 milliGRAM(s) Oral every 8 hours  insulin aspart (NovoLOG) corrective regimen sliding scale.   SubCutaneous three times a day before meals  insulin aspart (NovoLOG) corrective regimen sliding scale.   SubCutaneous at bedtime  insulin detemir injectable (LEVEMIR) 56 Unit(s) SubCutaneous two times a day  insulin lispro Injectable (HumaLOG) 48 Unit(s) SubCutaneous three times a day before meals  isosorbide   dinitrate Tablet (ISORDIL) 30 milliGRAM(s) Oral three times a day  lactated ringers. 1000 milliLiter(s) (75 mL/Hr) IV Continuous <Continuous>  levothyroxine 50 MICROGram(s) Oral daily  lidocaine   Patch 1 Patch Transdermal daily  melatonin 3 milliGRAM(s) Oral at bedtime  metoprolol succinate ER 25 milliGRAM(s) Oral daily  montelukast 10 milliGRAM(s) Oral daily  Nephro-enrico 1 Tablet(s) Oral daily  pantoprazole    Tablet 40 milliGRAM(s) Oral before breakfast  polyethylene glycol 3350 17 Gram(s) Oral daily  senna 2 Tablet(s) Oral at bedtime  sodium bicarbonate 650 milliGRAM(s) Oral two times a day  spironolactone 25 milliGRAM(s) Oral daily      LABS: All Labs Reviewed:                        8.9    8.1   )-----------( 396      ( 31 Aug 2019 08:14 )             26.3     08-31    135  |  97  |  102<H>  ----------------------------<  197<H>  3.6   |  18<L>  |  2.06<H>    Ca    9.8      31 Aug 2019 08:14            Blood Culture:   Urine Culture      RADIOLOGY/EKG:    ASSESSMENT AND PLAN:  patient condition remained stable glycemic control  was improving but  Prandin was discontinued and patient was started on Levemir unfortunately the pharmacy did not approve U5 100  This case was discussed in with medical team in detail     #diabetes continue                   insulin aspart (NovoLOG) corrective regimen sliding scale.   SubCutaneous three times a day before meals                    insulin aspart (NovoLOG) corrective regimen sliding scale.   SubCutaneous at bedtime                 insulin detemir injectable (LEVEMIR) 56 Unit(s) SubCutaneous two times a day                  insulin lispro Injectable (HumaLOG) 48 Unit(s) SubCutaneous three times a day before meals  #CHF hypertension continue above management stable  Family wants to take her home over all she is high risk for readmission and intubation at home she is full code at present time    DVT PPX:    ADVANCED DIRECTIVE:    DISPOSITION:

## 2019-08-31 NOTE — PROVIDER CONTACT NOTE (OTHER) - REASON
Ectopy
FS >400 x1
PAT hr 130 for 35 seconds
PAT upto 120 for 6.2 sec
PT noted with 13 beats WCT on tele
Patient c/o chest pressure and "not feeling good"
Patient c/o chest pressure, SOB with tachypnea, left arm pain and general discomfort
Patient had Blood glucose above 400.
Patient had soft BP.
Pt , PT requesting to work with pt
Pt BP 92/57
Pt complained of SOB, Chest pain
Pt complained of heaviness on the chest and neck pain
Pt complained of neck pain, not feeling well and wanting sleep
Pt had PAT's with HR upto 140 for 2.25seconds.
Pt in AFL W HR to 150s on cardiac monitor.
Pt is about to be started on a heparin drip-pt's APTT 95.5
Tachycardiac, 120-130s on Tele sustaining.
Tele event
digoxin held this am
patient had PAT  x 2.8 seconds
pt 
pt bleeding from subQ Hep shot
pt fs 107, pt to receive 84 units of lantus
pt states she doesn't feel well
patient having chills, SOB during platelet transfusion.
Pre-meal insulin for dinner

## 2019-08-31 NOTE — PROVIDER CONTACT NOTE (OTHER) - BACKGROUND
Type 2 DM.
73 y F. Dx: SOB. PMH: GERD, CAD, Hypothyroidism, HLD, DM II.
74 y/o F. Admit DX: SOB. PMH: CAD, s/p CABG, DM.
Admit Dx: CHF exacerbation with respiratory failure, SVT, Aflutter. Hx: HTN, DM2, CAD s/p CABG, non-dilated ICM, severe MR, pHTN, CKD
Admit Dx: CHF exacerbation with respiratory failure, SVT, Aflutter. Hx: HTN, DM2, CAD s/p CABG, non-dilated ICM, severe MR, pHTN, CKD
Admit Dx; SOB, acute on chronic systolic HF. Hx: GERD, CAD, hypothyroidism, HLD, DM
Dx CHF, SVT, SOB.   Hx GERD, CAD, Hypothyroidism, HLD, DM2
PT admitted with DX- SOB HX- Hypothyroidism, CAD, HTN, HLD, DM.
Pt admitted for CHF
Pt admitted for SOB
Pt admitted for SOB, acute on chronic systolic CHF. Pt has hx of CAD, HLD, Diabetes mellitus type 2 hemoglobin A1C 7.3
Pt admitted for SOB, had RRT this morning for SVT
Pt admitted to the hospital with  Heart failure
Pt admitted with Acute on chronic systolic CHF with respiratory failure s/p bipap /SVT 's / Elevated LFTs/ Aflutter /Anemia.
Pt admitted with SOB
Pt came in with acute on chronic systolic heart failure. Pt has a hx of GERD, CAD, hypothyroidism, HLD, dm type II.
Pt was admitted with shortness of breath  CHF  Pallative care met with pt and family
Pt was admitted with shortness of breath  CHF  Pallative care met with pt and family
SVT, CHF, DM2
pt admitted for sob, chf exacerbation, svt. pmh CAD, dm, duncan,
pt admitted with SOB,
pt admitted with chronic hf, resp failure, SVT,

## 2019-08-31 NOTE — PROGRESS NOTE ADULT - SUBJECTIVE AND OBJECTIVE BOX
Endocrinology Attending Covering for Dr. Banks      Chief complaint  Patient is a 73y old  Female who presents with a chief complaint of Hypoxia, SOB (30 Aug 2019 19:52)   Review of systems  Patient in bed, looks comfortable, no fever,  had no hypoglycemia.    Labs and Fingersticks  CAPILLARY BLOOD GLUCOSE      POCT Blood Glucose.: 286 mg/dL (31 Aug 2019 11:50)  POCT Blood Glucose.: 234 mg/dL (31 Aug 2019 07:59)  POCT Blood Glucose.: 232 mg/dL (31 Aug 2019 03:11)  POCT Blood Glucose.: 366 mg/dL (30 Aug 2019 21:39)  POCT Blood Glucose.: 256 mg/dL (30 Aug 2019 17:09)      Anion Gap, Serum: 20 <H> (08-31 @ 08:14)  Anion Gap, Serum: 20 <H> (08-30 @ 06:45)      Calcium, Total Serum: 9.8 (08-31 @ 08:14)  Calcium, Total Serum: 10.1 (08-30 @ 06:45)          08-31    135  |  97  |  102<H>  ----------------------------<  197<H>  3.6   |  18<L>  |  2.06<H>    Ca    9.8      31 Aug 2019 08:14                          8.9    8.1   )-----------( 396      ( 31 Aug 2019 08:14 )             26.3     Medications  MEDICATIONS  (STANDING):  acetaminophen  IVPB .. 1000 milliGRAM(s) IV Intermittent once  aspirin enteric coated 81 milliGRAM(s) Oral daily  atorvastatin 40 milliGRAM(s) Oral at bedtime  buMETAnide 3 milliGRAM(s) Oral every 12 hours  chlorhexidine 2% Cloths 1 Application(s) Topical daily  clopidogrel Tablet 75 milliGRAM(s) Oral daily  colchicine 0.6 milliGRAM(s) Oral daily  dextrose 5%. 1000 milliLiter(s) (50 mL/Hr) IV Continuous <Continuous>  dextrose 50% Injectable 12.5 Gram(s) IV Push once  dextrose 50% Injectable 25 Gram(s) IV Push once  dextrose 50% Injectable 25 Gram(s) IV Push once  docusate sodium 100 milliGRAM(s) Oral two times a day  heparin  Injectable 5000 Unit(s) SubCutaneous every 12 hours  hydrALAZINE 50 milliGRAM(s) Oral every 8 hours  insulin aspart (NovoLOG) corrective regimen sliding scale.   SubCutaneous three times a day before meals  insulin aspart (NovoLOG) corrective regimen sliding scale.   SubCutaneous at bedtime  insulin detemir injectable (LEVEMIR) 56 Unit(s) SubCutaneous two times a day  insulin lispro Injectable (HumaLOG) 48 Unit(s) SubCutaneous three times a day before meals  isosorbide   dinitrate Tablet (ISORDIL) 30 milliGRAM(s) Oral three times a day  lactated ringers. 1000 milliLiter(s) (75 mL/Hr) IV Continuous <Continuous>  levothyroxine 50 MICROGram(s) Oral daily  lidocaine   Patch 1 Patch Transdermal daily  melatonin 3 milliGRAM(s) Oral at bedtime  metoprolol succinate ER 25 milliGRAM(s) Oral daily  montelukast 10 milliGRAM(s) Oral daily  Nephro-enrico 1 Tablet(s) Oral daily  pantoprazole    Tablet 40 milliGRAM(s) Oral before breakfast  polyethylene glycol 3350 17 Gram(s) Oral daily  senna 2 Tablet(s) Oral at bedtime  sodium bicarbonate 650 milliGRAM(s) Oral two times a day  spironolactone 25 milliGRAM(s) Oral daily      Physical Exam  General: Patient comfortable in bed  Vital Signs Last 12 Hrs  T(F): 97.8 (08-31-19 @ 11:24), Max: 97.8 (08-31-19 @ 11:24)  HR: 85 (08-31-19 @ 14:26) (83 - 86)  BP: 102/64 (08-31-19 @ 14:26) (96/58 - 105/66)  BP(mean): --  RR: 18 (08-31-19 @ 11:24) (18 - 20)  SpO2: 100% (08-31-19 @ 11:24) (100% - 100%)  Neck: No palpable thyroid nodules.  CVS: S1S2, No murmurs  Respiratory: No wheezing, no crepitations  GI: Abdomen soft, bowel sounds positive  Musculoskeletal:  edema lower extremities.   Skin: No skin rashes, no ecchymosis    Diagnostics

## 2019-08-31 NOTE — PROVIDER CONTACT NOTE (OTHER) - ASSESSMENT
BP of 98/42, SOB. history of HF, SOB. SPO2 100.
Pt refusing to eat dinner
109/63 hr 73 tempo 97.8 spo2 98% room air.
112/64 hr 75 temp 98.4 spo2 100% 2lnc. pt sitting in chair, put patient back in bed
A&03, VSS. Pt anxious and c/o pain to neck- lidocaine patch on.   Asymptomatic to low BS- pt usually in 200s. Pt has poor appetite. Given juice/yogurt.
A&03, VSS. Pt anxious and c/o pain to neck- lidocaine patch on.   RLQ bleeding s/p hep shot from 5pm med pass. Dressing saturated 2x, pressure was held.
PT A&Ox4. Pt is having no signs and symptoms of bleeding.
PT A+Ox3. VSS. Pt  after AM lantus, SS, and standing insulin. PT requests to work with pt.
Patient asleep at time of event. A&Ox4, VSS. Asymptomatic.
Patient is A&O3-4, VSS. c/o non-radiating midsternal chest pressure and heaviness and "not feeling well". unable to describe characteristics of chest pressure. Denies headache, dizziness, n/v, chest pain, palpitations, radiation or numbness/tingling in extremities. active ROM present in all extemities.
Patient is A&Ox4, VSS. Denies headache, dizziness, chest pain, palpitations, numbness/tingling in extremities. Neurological function intact, no acute changes. Patient is noted to be anxious. NSR 80s on telemetry, no ectopies at this time.
Patient sleeping in bed comfortably, asymptomatic. VSS: HR-85, BP-103/61, O2-99% 2L NC, Respirations-20, Temp- 97.8F.
Pt A+Ox3. Pt denies dizziness, SOB, chest pain at this time. Pt is up and alert in the chair after PT. BP taken electronically and manually with the same results
Pt VS are stable   Pt complains of pain   Pt states they don't feel well and says they want to sleep - VS are all stable   phone was used and pt did not want it. Only said they wanted sleep in English
Pt VS are stable   Pt complains of pain   Seems to be more neck pain then chest pain
Pt aox4, responsive, pt c/o chest pain/discomfort 7/10 with dizziness.
Pt asleep at time of event. See vitals flowsheet. Pt denies SOB, CP, palpitations, dizziness
Pt asymptomatic. Pt asked to change her position at the time of the incident. VSS. Denies CP ,SOB, Palpitations.
Pt is A&Ox4 forgetful VSS see flow sheet for reference. Pt denies chest pain, dizziness, lightheadedness, headache, SOB.
Pt. A&Ox4, alert, c/o of chest pain. BP: 90/62, , RR 18, sp02: 100% on 2L NC, Temp: 97.9.
VSS as per flowsheet
VSS. No distress noted.  Denies any chest discomfort.  No s/s of SOB noted.
pt A&Ox4, VSS
pt in SR, MAP remains >60
vitals checked and is stable, denies any chest pain ,SOB, dizziness at this time

## 2019-08-31 NOTE — PROGRESS NOTE ADULT - ASSESSMENT
Pt is a 74 yo woman with PMHx of HTN, T2DM, CAD s/p CABG (LIMA to LAD, SVG to OM, SVG to PDA 2014 at [facility]), non-dilated ICM (EF 20-25%), severe mitral regurgitation s/p mitral clip (6/13/19 Dr. Newman), severe pulm HTN, CKD, hypothyroidism admitted with worsening SOB.    A/P:  MERVIN  Likely due to cardiogenic shock  Renal function remains stable   subject to fluctuations based on Renal Perfusion.  Pt currently on Bumex 3 mg PO Q12 and spironolactone 25mg daily  - Scr currently stable   - Continue diuretic therapy per cardiology.  - Optimize glucose control  - Monitor renal function   - Avoid further nephrotoxics, NSAIDS, RCA    Hyponatremia  in the setting of hyperglycemia.   Currently stable. Monitor   Optimize glucose control    CKD stage 4:  baseline Scr 1.8-2.0. Scr stable today.   Renal function fluctuates sec to CHF  Monitor renal function at present    SOB:  in setting of HF. Improved on diuretic therapy.   Continue diuretics per cardiology    Acidosis   Sec to Renal failure  on sodium bicarb 650mg BID  Monitor serum Co2     Hypokalemia:  in the setting of diuretic therapy.   Serum potassium stable      Anemia:   Hb stable  Pt with iron deficiency anemia.

## 2019-08-31 NOTE — PROVIDER CONTACT NOTE (OTHER) - NAME OF MD/NP/PA/DO NOTIFIED:
Patricia Napoles, PA
CHENG Hutton
CHENG Spicer
Christophe Brennan, NP
Clarissa Lopez, PA
Cristel Head, PA
Deidre Johnson, NP
Deidre, NP
Dr. Jeter
Jes Rays NP
Medicine CHENG rubio
Monique FIELD
Natalia Carr NP
Paradise FIELD
RASHIDA Napoles
RASHIDA Shore
RASHIDA Shore
RASHIDA Sousa.
RASHIDA Stevens
Rola Stevens, PA
Shakila, DEMETRIO
Tamy Purvis,NP
Triny FIELD
Yohana FIELD
Zahida Hutton, NP
Zahida Hutton, NP
Denis Beck MD

## 2019-08-31 NOTE — PROVIDER CONTACT NOTE (OTHER) - SITUATION
patient having chills, SOB during platelet transfusion. Transfusion stopped immediately, Provider contacted. SEE RRT note. Temperature persisted to be high post RRT, Provider made aware .
Pt ordered for 26 units of pre-meal insulin
PAT hr 130 for 35 seconds
PAT upto 120 for 6.2 sec
PT noted with 13 beats WCT on tele.
Patient c/o chest pressure and "not feeling good"
Patient c/o chest pressure, SOB with tachypnea, left arm pain and general discomfort
Patient had episode of PAF x 7.1 sec, HR into the 140s.
Pt , PT requesting to work with pt
Pt BP 92/57
Pt complained of SOB, Chest pain
Pt complained of heaviness on the chest and neck pain
Pt complained of neck pain, not feeling well and wanting sleep
Pt had 26 wide complex beats on tele, not first occurrence
Pt had PAT's with HR upto 140 for 2.25seconds.
Pt in AFL W HR to 150s on cardiac monitor.
Pt is about to be started on a heparin drip-pt's APTT 95.5
Pts blood glucose checked prior to lunch at 1209 , repeat FS at 1214 395. Pt asymptomatic
Tachycardiac, 120-130s on Tele sustaining.
evy held this am
patient had PAT  x 2.8 seconds
pt 
pt bleeding from subQ Hep shot
pt fs 107, pt to receive 84 units of lantus
pt states she doesn't feel well, her neck hurts and she feels weak

## 2019-08-31 NOTE — PROGRESS NOTE ADULT - ASSESSMENT
Assessment  DMT2: 73y Female with resistant DM T2 with hyperglycemia on large dose of Basal  and rapid acting insulin, also was given a trial of Prandin to see if she make any insulin, her sugars remain elevated. She is feeling weak. Discussed with pharmacy if we can switch to U 500minsulin since she needs multiple injections and needs DC planing, pharmacy did not approve SubQ U500 but only for insulin pump.  CAD: S/P cabg On medications, stable, monitored.  Gout: colchiceine regimen completed as per rheum recommendations.  Hypothyroidism: synthroid 50 mcg po daily, asymptomatic.  HTN: Controlled, On med.  HLD; on statin  CKD: Monitor labs/BMP      Plan:   DMT2:  Will  increase  Levemir 56 u BID and Novolog  48 u before meal, continue correction scale coverage. Will continue monitoring FS, log, and follow up. Recommended to patient and primary team to DC her to SNF/rehab.    Resistance du poor absorption of insulin ( Lipodisthrophy )Consider putting patient on insulin pump.  Her cortisol was normal, but could not see results of Anti insulin Ab, Order Againe  . CAD: S/P cabg On medications, stable, monitored.  Gout: ankle pain improved s/p colchiceine regimen.  Hypothyroidism: Continue synthroid 50 mcg po daily, asymptomatic. FU TFTs  HTN: Continue treatment, monitoring, Primary team FU  HLD; on statin  CKD: Continue monitoring labs, renal FU    Rogelio Woodard MD  [phone]

## 2019-09-01 LAB
ANION GAP SERPL CALC-SCNC: 17 MMOL/L — SIGNIFICANT CHANGE UP (ref 5–17)
BUN SERPL-MCNC: 104 MG/DL — HIGH (ref 7–23)
CALCIUM SERPL-MCNC: 9.9 MG/DL — SIGNIFICANT CHANGE UP (ref 8.4–10.5)
CHLORIDE SERPL-SCNC: 99 MMOL/L — SIGNIFICANT CHANGE UP (ref 96–108)
CO2 SERPL-SCNC: 18 MMOL/L — LOW (ref 22–31)
CREAT SERPL-MCNC: 2.14 MG/DL — HIGH (ref 0.5–1.3)
GLUCOSE BLDC GLUCOMTR-MCNC: 222 MG/DL — HIGH (ref 70–99)
GLUCOSE BLDC GLUCOMTR-MCNC: 253 MG/DL — HIGH (ref 70–99)
GLUCOSE BLDC GLUCOMTR-MCNC: 258 MG/DL — HIGH (ref 70–99)
GLUCOSE BLDC GLUCOMTR-MCNC: 280 MG/DL — HIGH (ref 70–99)
GLUCOSE BLDC GLUCOMTR-MCNC: 319 MG/DL — HIGH (ref 70–99)
GLUCOSE BLDC GLUCOMTR-MCNC: 321 MG/DL — HIGH (ref 70–99)
GLUCOSE BLDC GLUCOMTR-MCNC: 345 MG/DL — HIGH (ref 70–99)
GLUCOSE SERPL-MCNC: 246 MG/DL — HIGH (ref 70–99)
HCT VFR BLD CALC: 27.4 % — LOW (ref 34.5–45)
HGB BLD-MCNC: 8.9 G/DL — LOW (ref 11.5–15.5)
MCHC RBC-ENTMCNC: 28.5 PG — SIGNIFICANT CHANGE UP (ref 27–34)
MCHC RBC-ENTMCNC: 32.5 GM/DL — SIGNIFICANT CHANGE UP (ref 32–36)
MCV RBC AUTO: 87.8 FL — SIGNIFICANT CHANGE UP (ref 80–100)
PLATELET # BLD AUTO: 370 K/UL — SIGNIFICANT CHANGE UP (ref 150–400)
POTASSIUM SERPL-MCNC: 3.9 MMOL/L — SIGNIFICANT CHANGE UP (ref 3.5–5.3)
POTASSIUM SERPL-SCNC: 3.9 MMOL/L — SIGNIFICANT CHANGE UP (ref 3.5–5.3)
RBC # BLD: 3.12 M/UL — LOW (ref 3.8–5.2)
RBC # FLD: 16.2 % — HIGH (ref 10.3–14.5)
SODIUM SERPL-SCNC: 134 MMOL/L — LOW (ref 135–145)
WBC # BLD: 7.81 K/UL — SIGNIFICANT CHANGE UP (ref 3.8–10.5)
WBC # FLD AUTO: 7.81 K/UL — SIGNIFICANT CHANGE UP (ref 3.8–10.5)

## 2019-09-01 RX ORDER — INSULIN DETEMIR 100/ML (3)
75 INSULIN PEN (ML) SUBCUTANEOUS AT BEDTIME
Refills: 0 | Status: DISCONTINUED | OUTPATIENT
Start: 2019-09-01 | End: 2019-09-02

## 2019-09-01 RX ORDER — INSULIN DETEMIR 100/ML (3)
65 INSULIN PEN (ML) SUBCUTANEOUS DAILY
Refills: 0 | Status: DISCONTINUED | OUTPATIENT
Start: 2019-09-01 | End: 2019-09-02

## 2019-09-01 RX ORDER — INSULIN LISPRO 100/ML
55 VIAL (ML) SUBCUTANEOUS
Refills: 0 | Status: DISCONTINUED | OUTPATIENT
Start: 2019-09-01 | End: 2019-09-01

## 2019-09-01 RX ORDER — INSULIN ASPART 100 [IU]/ML
50 INJECTION, SOLUTION SUBCUTANEOUS
Refills: 0 | Status: DISCONTINUED | OUTPATIENT
Start: 2019-09-01 | End: 2019-09-02

## 2019-09-01 RX ORDER — INSULIN DETEMIR 100/ML (3)
70 INSULIN PEN (ML) SUBCUTANEOUS DAILY
Refills: 0 | Status: DISCONTINUED | OUTPATIENT
Start: 2019-09-01 | End: 2019-09-01

## 2019-09-01 RX ORDER — INSULIN DETEMIR 100/ML (3)
65 INSULIN PEN (ML) SUBCUTANEOUS AT BEDTIME
Refills: 0 | Status: DISCONTINUED | OUTPATIENT
Start: 2019-09-01 | End: 2019-09-01

## 2019-09-01 RX ADMIN — Medication 50 MILLIGRAM(S): at 13:33

## 2019-09-01 RX ADMIN — BUMETANIDE 3 MILLIGRAM(S): 0.25 INJECTION INTRAMUSCULAR; INTRAVENOUS at 17:10

## 2019-09-01 RX ADMIN — ATORVASTATIN CALCIUM 40 MILLIGRAM(S): 80 TABLET, FILM COATED ORAL at 21:46

## 2019-09-01 RX ADMIN — CLOPIDOGREL BISULFATE 75 MILLIGRAM(S): 75 TABLET, FILM COATED ORAL at 12:23

## 2019-09-01 RX ADMIN — Medication 48 UNIT(S): at 12:24

## 2019-09-01 RX ADMIN — INSULIN ASPART 2: 100 INJECTION, SOLUTION SUBCUTANEOUS at 21:46

## 2019-09-01 RX ADMIN — Medication 100 MILLIGRAM(S): at 17:10

## 2019-09-01 RX ADMIN — Medication 650 MILLIGRAM(S): at 05:41

## 2019-09-01 RX ADMIN — Medication 48 UNIT(S): at 09:17

## 2019-09-01 RX ADMIN — Medication 0.25 MILLIGRAM(S): at 22:55

## 2019-09-01 RX ADMIN — BUMETANIDE 3 MILLIGRAM(S): 0.25 INJECTION INTRAMUSCULAR; INTRAVENOUS at 05:41

## 2019-09-01 RX ADMIN — LIDOCAINE 1 PATCH: 4 CREAM TOPICAL at 22:04

## 2019-09-01 RX ADMIN — INSULIN ASPART 8: 100 INJECTION, SOLUTION SUBCUTANEOUS at 12:24

## 2019-09-01 RX ADMIN — Medication 50 MICROGRAM(S): at 05:41

## 2019-09-01 RX ADMIN — HEPARIN SODIUM 5000 UNIT(S): 5000 INJECTION INTRAVENOUS; SUBCUTANEOUS at 17:10

## 2019-09-01 RX ADMIN — Medication 0.6 MILLIGRAM(S): at 12:23

## 2019-09-01 RX ADMIN — ISOSORBIDE DINITRATE 30 MILLIGRAM(S): 5 TABLET ORAL at 05:41

## 2019-09-01 RX ADMIN — ISOSORBIDE DINITRATE 30 MILLIGRAM(S): 5 TABLET ORAL at 13:33

## 2019-09-01 RX ADMIN — Medication 25 MILLIGRAM(S): at 05:42

## 2019-09-01 RX ADMIN — LIDOCAINE 1 PATCH: 4 CREAM TOPICAL at 09:00

## 2019-09-01 RX ADMIN — LIDOCAINE 1 PATCH: 4 CREAM TOPICAL at 07:00

## 2019-09-01 RX ADMIN — Medication 650 MILLIGRAM(S): at 17:10

## 2019-09-01 RX ADMIN — CHLORHEXIDINE GLUCONATE 1 APPLICATION(S): 213 SOLUTION TOPICAL at 21:45

## 2019-09-01 RX ADMIN — Medication 50 MILLIGRAM(S): at 05:41

## 2019-09-01 RX ADMIN — INSULIN ASPART 8: 100 INJECTION, SOLUTION SUBCUTANEOUS at 09:18

## 2019-09-01 RX ADMIN — SPIRONOLACTONE 25 MILLIGRAM(S): 25 TABLET, FILM COATED ORAL at 05:41

## 2019-09-01 RX ADMIN — Medication 1 TABLET(S): at 12:23

## 2019-09-01 RX ADMIN — ISOSORBIDE DINITRATE 30 MILLIGRAM(S): 5 TABLET ORAL at 21:45

## 2019-09-01 RX ADMIN — Medication 75 UNIT(S): at 22:05

## 2019-09-01 RX ADMIN — INSULIN ASPART 50 UNIT(S): 100 INJECTION, SOLUTION SUBCUTANEOUS at 17:11

## 2019-09-01 RX ADMIN — HEPARIN SODIUM 5000 UNIT(S): 5000 INJECTION INTRAVENOUS; SUBCUTANEOUS at 05:45

## 2019-09-01 RX ADMIN — Medication 0.25 MILLIGRAM(S): at 09:28

## 2019-09-01 RX ADMIN — PANTOPRAZOLE SODIUM 40 MILLIGRAM(S): 20 TABLET, DELAYED RELEASE ORAL at 05:41

## 2019-09-01 RX ADMIN — Medication 50 MILLIGRAM(S): at 21:45

## 2019-09-01 RX ADMIN — INSULIN ASPART 6: 100 INJECTION, SOLUTION SUBCUTANEOUS at 17:11

## 2019-09-01 RX ADMIN — POLYETHYLENE GLYCOL 3350 17 GRAM(S): 17 POWDER, FOR SOLUTION ORAL at 12:24

## 2019-09-01 RX ADMIN — Medication 3 MILLIGRAM(S): at 21:45

## 2019-09-01 RX ADMIN — Medication 56 UNIT(S): at 09:18

## 2019-09-01 RX ADMIN — Medication 81 MILLIGRAM(S): at 12:23

## 2019-09-01 RX ADMIN — MONTELUKAST 10 MILLIGRAM(S): 4 TABLET, CHEWABLE ORAL at 12:23

## 2019-09-01 NOTE — PROGRESS NOTE ADULT - PROBLEM SELECTOR PROBLEM 6
Anemia
Anemia
Palliative care encounter
CKD (chronic kidney disease), stage IV
CKD (chronic kidney disease), stage IV
Anemia
CKD (chronic kidney disease), stage IV
Anemia
Palliative care encounter
Palliative care encounter

## 2019-09-01 NOTE — PROGRESS NOTE ADULT - SUBJECTIVE AND OBJECTIVE BOX
Chief complaint  Patient is a 73y old  Female who presents with a chief complaint of Hypoxia, SOB (01 Sep 2019 12:15)   Review of systems  Patient in bed, looks comfortable, no fever, no hypoglycemia.    Labs and Fingersticks  CAPILLARY BLOOD GLUCOSE      POCT Blood Glucose.: 345 mg/dL (01 Sep 2019 11:56)  POCT Blood Glucose.: 321 mg/dL (01 Sep 2019 09:16)  POCT Blood Glucose.: 319 mg/dL (01 Sep 2019 07:51)  POCT Blood Glucose.: 258 mg/dL (01 Sep 2019 05:49)  POCT Blood Glucose.: 222 mg/dL (01 Sep 2019 00:10)  POCT Blood Glucose.: 283 mg/dL (31 Aug 2019 21:08)  POCT Blood Glucose.: 352 mg/dL (31 Aug 2019 16:27)      Anion Gap, Serum: 17 (09-01 @ 06:24)  Anion Gap, Serum: 20 <H> (08-31 @ 08:14)      Calcium, Total Serum: 9.9 (09-01 @ 06:24)  Calcium, Total Serum: 9.8 (08-31 @ 08:14)          09-01    134<L>  |  99  |  104<H>  ----------------------------<  246<H>  3.9   |  18<L>  |  2.14<H>    Ca    9.9      01 Sep 2019 06:24                          8.9    7.81  )-----------( 370      ( 01 Sep 2019 09:31 )             27.4     Medications  MEDICATIONS  (STANDING):  acetaminophen  IVPB .. 1000 milliGRAM(s) IV Intermittent once  aspirin enteric coated 81 milliGRAM(s) Oral daily  atorvastatin 40 milliGRAM(s) Oral at bedtime  buMETAnide 3 milliGRAM(s) Oral every 12 hours  chlorhexidine 2% Cloths 1 Application(s) Topical daily  clopidogrel Tablet 75 milliGRAM(s) Oral daily  colchicine 0.6 milliGRAM(s) Oral daily  dextrose 5%. 1000 milliLiter(s) (50 mL/Hr) IV Continuous <Continuous>  dextrose 50% Injectable 12.5 Gram(s) IV Push once  dextrose 50% Injectable 25 Gram(s) IV Push once  dextrose 50% Injectable 25 Gram(s) IV Push once  docusate sodium 100 milliGRAM(s) Oral two times a day  heparin  Injectable 5000 Unit(s) SubCutaneous every 12 hours  hydrALAZINE 50 milliGRAM(s) Oral every 8 hours  insulin aspart (NovoLOG) corrective regimen sliding scale.   SubCutaneous three times a day before meals  insulin aspart (NovoLOG) corrective regimen sliding scale.   SubCutaneous at bedtime  insulin detemir injectable (LEVEMIR) 65 Unit(s) SubCutaneous daily  insulin detemir injectable (LEVEMIR) 75 Unit(s) SubCutaneous at bedtime  isosorbide   dinitrate Tablet (ISORDIL) 30 milliGRAM(s) Oral three times a day  lactated ringers. 1000 milliLiter(s) (75 mL/Hr) IV Continuous <Continuous>  levothyroxine 50 MICROGram(s) Oral daily  lidocaine   Patch 1 Patch Transdermal daily  melatonin 3 milliGRAM(s) Oral at bedtime  metoprolol succinate ER 25 milliGRAM(s) Oral daily  montelukast 10 milliGRAM(s) Oral daily  Nephro-enrico 1 Tablet(s) Oral daily  pantoprazole    Tablet 40 milliGRAM(s) Oral before breakfast  polyethylene glycol 3350 17 Gram(s) Oral daily  senna 2 Tablet(s) Oral at bedtime  sodium bicarbonate 650 milliGRAM(s) Oral two times a day  spironolactone 25 milliGRAM(s) Oral daily      Physical Exam  General: Patient comfortable in bed  Vital Signs Last 12 Hrs  T(F): 98 (09-01-19 @ 11:36), Max: 98 (09-01-19 @ 11:36)  HR: 93 (09-01-19 @ 13:31) (88 - 96)  BP: 113/68 (09-01-19 @ 13:31) (99/61 - 113/68)  BP(mean): --  RR: 18 (09-01-19 @ 13:31) (18 - 18)  SpO2: 99% (09-01-19 @ 13:31) (99% - 100%)  Neck: No palpable thyroid nodules.  CVS: S1S2, No murmurs  Respiratory: No wheezing, no crepitations  GI: Abdomen soft, bowel sounds positive  Musculoskeletal:  edema lower extremities.   Skin: No skin rashes, no ecchymosis    Diagnostics Chief complaint  Patient is a 73y old  Female who presents with a chief complaint of Hypoxia, SOB (01 Sep 2019 12:15)   Review of systems  Patient in bed, looks comfortable, no fever,  no hypoglycemia.    Labs and Fingersticks  CAPILLARY BLOOD GLUCOSE      POCT Blood Glucose.: 345 mg/dL (01 Sep 2019 11:56)  POCT Blood Glucose.: 321 mg/dL (01 Sep 2019 09:16)  POCT Blood Glucose.: 319 mg/dL (01 Sep 2019 07:51)  POCT Blood Glucose.: 258 mg/dL (01 Sep 2019 05:49)  POCT Blood Glucose.: 222 mg/dL (01 Sep 2019 00:10)  POCT Blood Glucose.: 283 mg/dL (31 Aug 2019 21:08)  POCT Blood Glucose.: 352 mg/dL (31 Aug 2019 16:27)      Anion Gap, Serum: 17 (09-01 @ 06:24)  Anion Gap, Serum: 20 <H> (08-31 @ 08:14)      Calcium, Total Serum: 9.9 (09-01 @ 06:24)  Calcium, Total Serum: 9.8 (08-31 @ 08:14)          09-01    134<L>  |  99  |  104<H>  ----------------------------<  246<H>  3.9   |  18<L>  |  2.14<H>    Ca    9.9      01 Sep 2019 06:24                          8.9    7.81  )-----------( 370      ( 01 Sep 2019 09:31 )             27.4     Medications  MEDICATIONS  (STANDING):  acetaminophen  IVPB .. 1000 milliGRAM(s) IV Intermittent once  aspirin enteric coated 81 milliGRAM(s) Oral daily  atorvastatin 40 milliGRAM(s) Oral at bedtime  buMETAnide 3 milliGRAM(s) Oral every 12 hours  chlorhexidine 2% Cloths 1 Application(s) Topical daily  clopidogrel Tablet 75 milliGRAM(s) Oral daily  colchicine 0.6 milliGRAM(s) Oral daily  dextrose 5%. 1000 milliLiter(s) (50 mL/Hr) IV Continuous <Continuous>  dextrose 50% Injectable 12.5 Gram(s) IV Push once  dextrose 50% Injectable 25 Gram(s) IV Push once  dextrose 50% Injectable 25 Gram(s) IV Push once  docusate sodium 100 milliGRAM(s) Oral two times a day  heparin  Injectable 5000 Unit(s) SubCutaneous every 12 hours  hydrALAZINE 50 milliGRAM(s) Oral every 8 hours  insulin aspart (NovoLOG) corrective regimen sliding scale.   SubCutaneous three times a day before meals  insulin aspart (NovoLOG) corrective regimen sliding scale.   SubCutaneous at bedtime  insulin detemir injectable (LEVEMIR) 65 Unit(s) SubCutaneous daily  insulin detemir injectable (LEVEMIR) 75 Unit(s) SubCutaneous at bedtime  isosorbide   dinitrate Tablet (ISORDIL) 30 milliGRAM(s) Oral three times a day  lactated ringers. 1000 milliLiter(s) (75 mL/Hr) IV Continuous <Continuous>  levothyroxine 50 MICROGram(s) Oral daily  lidocaine   Patch 1 Patch Transdermal daily  melatonin 3 milliGRAM(s) Oral at bedtime  metoprolol succinate ER 25 milliGRAM(s) Oral daily  montelukast 10 milliGRAM(s) Oral daily  Nephro-enrico 1 Tablet(s) Oral daily  pantoprazole    Tablet 40 milliGRAM(s) Oral before breakfast  polyethylene glycol 3350 17 Gram(s) Oral daily  senna 2 Tablet(s) Oral at bedtime  sodium bicarbonate 650 milliGRAM(s) Oral two times a day  spironolactone 25 milliGRAM(s) Oral daily      Physical Exam  General: Patient comfortable in bed  Vital Signs Last 12 Hrs  T(F): 98 (09-01-19 @ 11:36), Max: 98 (09-01-19 @ 11:36)  HR: 93 (09-01-19 @ 13:31) (88 - 96)  BP: 113/68 (09-01-19 @ 13:31) (99/61 - 113/68)  BP(mean): --  RR: 18 (09-01-19 @ 13:31) (18 - 18)  SpO2: 99% (09-01-19 @ 13:31) (99% - 100%)  Neck: No palpable thyroid nodules.  CVS: S1S2, No murmurs  Respiratory: No wheezing, no crepitations  GI: Abdomen soft, bowel sounds positive  Musculoskeletal:  edema lower extremities.   Skin: No skin rashes, no ecchymosis    Diagnostics

## 2019-09-01 NOTE — PROGRESS NOTE ADULT - SUBJECTIVE AND OBJECTIVE BOX
CHIEF COMPLAINT:    SUBJECTIVE:     REVIEW OF SYSTEMS:    CONSTITUTIONAL: (  )  weakness,  (  ) fevers or chills  EYES/ENT: (  )visual changes;     NECK: (  ) pain or stiffness  RESPIRATORY:   (  )cough, wheezing, hemoptysis;  (  ) shortness of breath  CARDIOVASCULAR:  (  )chest pain or palpitations  GASTROINTESTINAL:   (  )abdominal or epigastric pain.  (  ) nausea, vomiting, or hematemesis;   (   ) diarrhea or constipation.   GENITOURINARY:   (    ) dysuria, frequency or hematuria  NEUROLOGICAL:  (   ) numbness or weakness   All other review of systems is negative unless indicated above    Vital Signs Last 24 Hrs  T(C): 36.6 (01 Sep 2019 04:31), Max: 36.6 (31 Aug 2019 11:24)  T(F): 97.9 (01 Sep 2019 04:31), Max: 97.9 (01 Sep 2019 04:31)  HR: 96 (01 Sep 2019 05:36) (85 - 96)  BP: 105/62 (01 Sep 2019 05:36) (96/59 - 110/69)  BP(mean): --  RR: 18 (01 Sep 2019 04:31) (18 - 18)  SpO2: 100% (01 Sep 2019 04:31) (99% - 100%)    I&O's Summary    31 Aug 2019 07:01  -  01 Sep 2019 07:00  --------------------------------------------------------  IN: 600 mL / OUT: 1125 mL / NET: -525 mL        CAPILLARY BLOOD GLUCOSE      POCT Blood Glucose.: 321 mg/dL (01 Sep 2019 09:16)  POCT Blood Glucose.: 319 mg/dL (01 Sep 2019 07:51)  POCT Blood Glucose.: 258 mg/dL (01 Sep 2019 05:49)  POCT Blood Glucose.: 222 mg/dL (01 Sep 2019 00:10)  POCT Blood Glucose.: 283 mg/dL (31 Aug 2019 21:08)  POCT Blood Glucose.: 352 mg/dL (31 Aug 2019 16:27)  POCT Blood Glucose.: 286 mg/dL (31 Aug 2019 11:50)      PHYSICAL EXAM:    Constitutional:  (   ) NAD,   (   )awake and alert  HEENT: PERR, EOMI,    Neck: Soft and supple, No LAD, No JVD  Respiratory:  (    Breath sounds are clear bilaterally,    (   ) wheezing, rales or rhonchi  Cardiovascular:     (   )S1 and S2, regular rate and rhythm, no Murmurs, gallops or rubs  Gastrointestinal:  (   )Bowel Sounds present, soft,   (  )nontender, nondistended,    Extremities:    (  ) peripheral edema  Vascular: 2+ peripheral pulses  Neurological:    (    )A/O x 3,   (  ) focal deficits  Musculoskeletal:    (   )  normal strength b/l upper  (     ) normal  lower extremities  Skin: No rashes    MEDICATIONS:  MEDICATIONS  (STANDING):  acetaminophen  IVPB .. 1000 milliGRAM(s) IV Intermittent once  aspirin enteric coated 81 milliGRAM(s) Oral daily  atorvastatin 40 milliGRAM(s) Oral at bedtime  buMETAnide 3 milliGRAM(s) Oral every 12 hours  chlorhexidine 2% Cloths 1 Application(s) Topical daily  clopidogrel Tablet 75 milliGRAM(s) Oral daily  colchicine 0.6 milliGRAM(s) Oral daily  dextrose 5%. 1000 milliLiter(s) (50 mL/Hr) IV Continuous <Continuous>  dextrose 50% Injectable 12.5 Gram(s) IV Push once  dextrose 50% Injectable 25 Gram(s) IV Push once  dextrose 50% Injectable 25 Gram(s) IV Push once  docusate sodium 100 milliGRAM(s) Oral two times a day  heparin  Injectable 5000 Unit(s) SubCutaneous every 12 hours  hydrALAZINE 50 milliGRAM(s) Oral every 8 hours  insulin aspart (NovoLOG) corrective regimen sliding scale.   SubCutaneous three times a day before meals  insulin aspart (NovoLOG) corrective regimen sliding scale.   SubCutaneous at bedtime  insulin detemir injectable (LEVEMIR) 56 Unit(s) SubCutaneous two times a day  insulin lispro Injectable (HumaLOG) 48 Unit(s) SubCutaneous three times a day before meals  isosorbide   dinitrate Tablet (ISORDIL) 30 milliGRAM(s) Oral three times a day  lactated ringers. 1000 milliLiter(s) (75 mL/Hr) IV Continuous <Continuous>  levothyroxine 50 MICROGram(s) Oral daily  lidocaine   Patch 1 Patch Transdermal daily  melatonin 3 milliGRAM(s) Oral at bedtime  metoprolol succinate ER 25 milliGRAM(s) Oral daily  montelukast 10 milliGRAM(s) Oral daily  Nephro-enrico 1 Tablet(s) Oral daily  pantoprazole    Tablet 40 milliGRAM(s) Oral before breakfast  polyethylene glycol 3350 17 Gram(s) Oral daily  senna 2 Tablet(s) Oral at bedtime  sodium bicarbonate 650 milliGRAM(s) Oral two times a day  spironolactone 25 milliGRAM(s) Oral daily      LABS: All Labs Reviewed:                        8.9    7.81  )-----------( 370      ( 01 Sep 2019 09:31 )             27.4     09-01    134<L>  |  99  |  104<H>  ----------------------------<  246<H>  3.9   |  18<L>  |  2.14<H>    Ca    9.9      01 Sep 2019 06:24            Blood Culture:   Urine Culture      RADIOLOGY/EKG:    ASSESSMENT AND PLAN:    DVT PPX:    ADVANCED DIRECTIVE:    DISPOSITION: CHIEF COMPLAINT:  patient was seen   today discussed with endocrinologist about her management and better control of hyperglycemia   her  insulin   was supposed to change Carmen 500 but unfortunately the pharmacy did not approve it.   She eventually need insulin pump secondary to resistance to treatment   SUBJECTIVE:     REVIEW OF SYSTEMS:    CONSTITUTIONAL: (x  )  weakness,  (  ) fevers or chills  EYES/ENT: (  )visual changes;     NECK: (  ) pain or stiffness  RESPIRATORY:   (  )cough, wheezing, hemoptysis;  (  ) shortness of breath  CARDIOVASCULAR:  (  )chest pain or palpitations  GASTROINTESTINAL:   (  )abdominal or epigastric pain.  (  ) nausea, vomiting, or hematemesis;   (   ) diarrhea or constipation.   GENITOURINARY:   (    ) dysuria, frequency or hematuria  NEUROLOGICAL:  ( x  ) numbness or weakness   All other review of systems is negative unless indicated above    Vital Signs Last 24 Hrs  T(C): 36.6 (01 Sep 2019 04:31), Max: 36.6 (31 Aug 2019 11:24)  T(F): 97.9 (01 Sep 2019 04:31), Max: 97.9 (01 Sep 2019 04:31)  HR: 96 (01 Sep 2019 05:36) (85 - 96)  BP: 105/62 (01 Sep 2019 05:36) (96/59 - 110/69)  BP(mean): --  RR: 18 (01 Sep 2019 04:31) (18 - 18)  SpO2: 100% (01 Sep 2019 04:31) (99% - 100%)    I&O's Summary    31 Aug 2019 07:01  -  01 Sep 2019 07:00  --------------------------------------------------------  IN: 600 mL / OUT: 1125 mL / NET: -525 mL        CAPILLARY BLOOD GLUCOSE      POCT Blood Glucose.: 321 mg/dL (01 Sep 2019 09:16)  POCT Blood Glucose.: 319 mg/dL (01 Sep 2019 07:51)  POCT Blood Glucose.: 258 mg/dL (01 Sep 2019 05:49)  POCT Blood Glucose.: 222 mg/dL (01 Sep 2019 00:10)  POCT Blood Glucose.: 283 mg/dL (31 Aug 2019 21:08)  POCT Blood Glucose.: 352 mg/dL (31 Aug 2019 16:27)  POCT Blood Glucose.: 286 mg/dL (31 Aug 2019 11:50)    POCT Blood Glucose.: 352 mg/dL (31 Aug 2019 16:27)  POCT Blood Glucose.: 286 mg/dL (31 Aug 2019 11:50)  POCT Blood Glucose.: 234 mg/dL (31 Aug 2019 07:59)  POCT Blood Glucose.: 232 mg/dL (31 Aug 2019 03:11)  POCT Blood Glucose.: 366 mg/dL (30 Aug 2019 21:39)    POCT Blood Glucose.: 256 mg/dL (30 Aug 2019 17:09)  POCT Blood Glucose.: 228 mg/dL (30 Aug 2019 11:54)  POCT Blood Glucose.: 230 mg/dL (30 Aug 2019 07:42)  POCT Blood Glucose.: 206 mg/dL (30 Aug 2019 06:46)  POCT Blood Glucose.: 244 mg/dL (30 Aug 2019 00:52)  POCT Blood Glucose.: 258 mg/dL (29 Aug 2019 21:05)    POCT Blood Glucose.: 142 mg/dL (29 Aug 2019 07:32)  POCT Blood Glucose.: 127 mg/dL (29 Aug 2019 06:14)  POCT Blood Glucose.: 115 mg/dL (29 Aug 2019 06:06)  POCT Blood Glucose.: 275 mg/dL (29 Aug 2019 00:10)  POCT Blood Glucose.: 301 mg/dL (28 Aug 2019 21:21)  POCT Blood Glucose.: 301 mg/dL (28 Aug 2019 16:27)  POCT Blood Glucose.: 306 mg/dL (28 Aug 2019 11:36)    PHYSICAL EXAM:      Neck: No palpable thyroid nodules.  CVS: S1S2, No murmurs  Respiratory: No wheezing, no crepitations  GI: Abdomen soft, bowel sounds positive  Musculoskeletal:  edema lower extremities.   Skin: No skin rashes, no ecchymosis    MEDICATIONS:  MEDICATIONS  (STANDING):  acetaminophen  IVPB .. 1000 milliGRAM(s) IV Intermittent once  aspirin enteric coated 81 milliGRAM(s) Oral daily  atorvastatin 40 milliGRAM(s) Oral at bedtime  buMETAnide 3 milliGRAM(s) Oral every 12 hours  chlorhexidine 2% Cloths 1 Application(s) Topical daily  clopidogrel Tablet 75 milliGRAM(s) Oral daily  colchicine 0.6 milliGRAM(s) Oral daily  dextrose 5%. 1000 milliLiter(s) (50 mL/Hr) IV Continuous <Continuous>  dextrose 50% Injectable 12.5 Gram(s) IV Push once  dextrose 50% Injectable 25 Gram(s) IV Push once  dextrose 50% Injectable 25 Gram(s) IV Push once  docusate sodium 100 milliGRAM(s) Oral two times a day  heparin  Injectable 5000 Unit(s) SubCutaneous every 12 hours  hydrALAZINE 50 milliGRAM(s) Oral every 8 hours  insulin aspart (NovoLOG) corrective regimen sliding scale.   SubCutaneous three times a day before meals  insulin aspart (NovoLOG) corrective regimen sliding scale.   SubCutaneous at bedtime  insulin detemir injectable (LEVEMIR) 56 Unit(s) SubCutaneous two times a day  insulin lispro Injectable (HumaLOG) 48 Unit(s) SubCutaneous three times a day before meals  isosorbide   dinitrate Tablet (ISORDIL) 30 milliGRAM(s) Oral three times a day  lactated ringers. 1000 milliLiter(s) (75 mL/Hr) IV Continuous <Continuous>  levothyroxine 50 MICROGram(s) Oral daily  lidocaine   Patch 1 Patch Transdermal daily  melatonin 3 milliGRAM(s) Oral at bedtime  metoprolol succinate ER 25 milliGRAM(s) Oral daily  montelukast 10 milliGRAM(s) Oral daily  Nephro-enrico 1 Tablet(s) Oral daily  pantoprazole    Tablet 40 milliGRAM(s) Oral before breakfast  polyethylene glycol 3350 17 Gram(s) Oral daily  senna 2 Tablet(s) Oral at bedtime  sodium bicarbonate 650 milliGRAM(s) Oral two times a day  spironolactone 25 milliGRAM(s) Oral daily      LABS: All Labs Reviewed:                        8.9    7.81  )-----------( 370      ( 01 Sep 2019 09:31 )             27.4     09-01    134<L>  |  99  |  104<H>  ----------------------------<  246<H>  3.9   |  18<L>  |  2.14<H>    Ca    9.9      01 Sep 2019 06:24            Blood Culture:   Urine Culture      RADIOLOGY/EKG:    ASSESSMENT AND PLAN:  patient condition remained stable glycemic control  was improving but  Prandin was discontinued and patient was started on Levemir unfortunately the pharmacy did not approve U5 100  This case was discussed in with medical team in detail     #diabetes continue                   insulin aspart (NovoLOG) corrective regimen sliding scale.   SubCutaneous three times a day before meals                    insulin aspart (NovoLOG) corrective regimen sliding scale.   SubCutaneous at bedtime                 insulin detemir injectable (LEVEMIR) 56 Unit(s) SubCutaneous two times a day                  insulin lispro Injectable (HumaLOG) 48 Unit(s) SubCutaneous three times a day before meals  #CHF hypertension continue above management stable  Family wants to take her home over all she is high risk for readmission and intubation at home she is full code at present time  discussed with  endocrinologist : Adjust her insulin to higher dose but eventually she needs insulin pump    DVT PPX:    ADVANCED DIRECTIVE:    DISPOSITION:

## 2019-09-01 NOTE — PROGRESS NOTE ADULT - SUBJECTIVE AND OBJECTIVE BOX
Dr. Muhammad  Office [phone]  Cell [phone]  [facility]  Cell [phone]      Patient is a 73y old  Female who presents with a chief complaint of Hypoxia, SOB (01 Sep 2019 10:22)      Patient seen and examined at bedside. No chest pain/sob    VITALS:  T(F): 97.9 (09-01-19 @ 04:31), Max: 97.9 (09-01-19 @ 04:31)  HR: 96 (09-01-19 @ 05:36)  BP: 105/62 (09-01-19 @ 05:36)  RR: 18 (09-01-19 @ 04:31)  SpO2: 100% (09-01-19 @ 04:31)  Wt(kg): --    08-31 @ 07:01  -  09-01 @ 07:00  --------------------------------------------------------  IN: 600 mL / OUT: 1125 mL / NET: -525 mL          PHYSICAL EXAM:  Constitutional: NAD  Neck: No JVD  Respiratory: CTAB, no wheezes, rales or rhonchi  Cardiovascular: S1, S2, RRR  Gastrointestinal: BS+, soft, NT/ND  Extremities: No peripheral edema    Hospital Medications:   MEDICATIONS  (STANDING):  acetaminophen  IVPB .. 1000 milliGRAM(s) IV Intermittent once  aspirin enteric coated 81 milliGRAM(s) Oral daily  atorvastatin 40 milliGRAM(s) Oral at bedtime  buMETAnide 3 milliGRAM(s) Oral every 12 hours  chlorhexidine 2% Cloths 1 Application(s) Topical daily  clopidogrel Tablet 75 milliGRAM(s) Oral daily  colchicine 0.6 milliGRAM(s) Oral daily  dextrose 5%. 1000 milliLiter(s) (50 mL/Hr) IV Continuous <Continuous>  dextrose 50% Injectable 12.5 Gram(s) IV Push once  dextrose 50% Injectable 25 Gram(s) IV Push once  dextrose 50% Injectable 25 Gram(s) IV Push once  docusate sodium 100 milliGRAM(s) Oral two times a day  heparin  Injectable 5000 Unit(s) SubCutaneous every 12 hours  hydrALAZINE 50 milliGRAM(s) Oral every 8 hours  insulin aspart (NovoLOG) corrective regimen sliding scale.   SubCutaneous three times a day before meals  insulin aspart (NovoLOG) corrective regimen sliding scale.   SubCutaneous at bedtime  insulin detemir injectable (LEVEMIR) 56 Unit(s) SubCutaneous two times a day  insulin lispro Injectable (HumaLOG) 48 Unit(s) SubCutaneous three times a day before meals  isosorbide   dinitrate Tablet (ISORDIL) 30 milliGRAM(s) Oral three times a day  lactated ringers. 1000 milliLiter(s) (75 mL/Hr) IV Continuous <Continuous>  levothyroxine 50 MICROGram(s) Oral daily  lidocaine   Patch 1 Patch Transdermal daily  melatonin 3 milliGRAM(s) Oral at bedtime  metoprolol succinate ER 25 milliGRAM(s) Oral daily  montelukast 10 milliGRAM(s) Oral daily  Nephro-enrico 1 Tablet(s) Oral daily  pantoprazole    Tablet 40 milliGRAM(s) Oral before breakfast  polyethylene glycol 3350 17 Gram(s) Oral daily  senna 2 Tablet(s) Oral at bedtime  sodium bicarbonate 650 milliGRAM(s) Oral two times a day  spironolactone 25 milliGRAM(s) Oral daily      LABS:  09-01    134<L>  |  99  |  104<H>  ----------------------------<  246<H>  3.9   |  18<L>  |  2.14<H>    Ca    9.9      01 Sep 2019 06:24      Creatinine Trend: 2.14 <--, 2.06 <--, 2.01 <--, 1.94 <--, 2.09 <--, 2.10 <--, 2.14 <--                                8.9    7.81  )-----------( 370      ( 01 Sep 2019 09:31 )             27.4     Urine Studies:  Urinalysis - [08-22-19 @ 16:43]      Color Light Yellow / Appearance Clear / SG 1.020 / pH 6.0      Gluc >=1000 mg/dL / Ketone Negative  / Bili Negative / Urobili <2 mg/dL       Blood Negative / Protein Negative / Leuk Est Negative / Nitrite Negative      RBC  / WBC  / Hyaline  / Gran  / Sq Epi  / Non Sq Epi  / Bacteria       Iron 42, TIBC 348, %sat 12      [07-05-19 @ 22:32]  Ferritin 130      [07-05-19 @ 22:32]  .4 (Ca --)      [04-25-19 @ 05:45]   --  PTH 43.55 (Ca --)      [09-10-18 @ 07:15]   --  PTH 36.60 (Ca --)      [09-08-18 @ 03:15]   --  Vitamin D (25OH) 25.9      [05-01-19 @ 04:00]  HbA1c 7.4      [07-05-19 @ 22:32]  TSH 1.02      [08-26-19 @ 08:22]  Lipid: chol 148, , HDL 35,       [06-01-19 @ 08:00]        RADIOLOGY & ADDITIONAL STUDIES:

## 2019-09-01 NOTE — PROGRESS NOTE ADULT - ASSESSMENT
Assessment  DMT2: 73y Female with resistant DM T2 with hyperglycemia, FS still elevated after adjusting her insulin regimen yesterday. She is eating meals, still feeling weak. Discussed with pharmacy if we can switch to U 500minsulin since she needs multiple injections and needs DC planing, pharmacy did not approve SubQ U500 but only for insulin pump.  CAD: S/P cabg On medications, stable, monitored.  Gout: colchiceine regimen completed as per rheum recommendations.  Hypothyroidism: synthroid 50 mcg po daily, asymptomatic.  HTN: Controlled, On med.  HLD; on statin  CKD: Monitor labs/BMP      Plan:   DMT2:  Will  increase  Levemir 65u AM, 75u PM and Novolog  50 u before meal (via phone order to pharmacy), continue correction scale coverage. Will continue monitoring FS, log, and follow up. Resistance due poor absorption of insulin ( Lipodisthrophy ) Consider putting patient on insulin pump.  Her cortisol was normal, but could not see results of Anti insulin Ab, Order Again  CAD: S/P cabg On medications, stable, monitored.  Gout: ankle pain improved s/p colchiceine regimen.  Hypothyroidism: Continue synthroid 50 mcg po daily, asymptomatic. FU TFTs  HTN: Continue treatment, monitoring, Primary team FU  HLD; on statin  CKD: Continue monitoring labs, renal FU Assessment  DMT2: 73y Female with resistant DM T2 with hyperglycemia, FS still elevated after adjusting her insulin regimen yesterday. She is eating meals, still feeling weak. Discussed with pharmacy if we can switch to U 500minsulin since she needs multiple injections and needs DC planing, pharmacy did not approve SubQ U500 but only for insulin pump.  CAD: S/P cabg On medications, stable, monitored.  Gout: colchiceine regimen completed as per rheum recommendations.  Hypothyroidism: synthroid 50 mcg po daily, asymptomatic.  HTN: Controlled, On med.  HLD; on statin  CKD: Monitor labs/BMP      Plan:   DMT2:  Will  increase  Levemir 65u AM, 75u PM and Novolog  50 u before meal  (via phone order to pharmacy), continue correction scale coverage. Will continue monitoring FS, log, and follow up. Resistance due poor absorption of insulin ( Lipodisthrophy ) Consider putting patient on insulin pump.  Her cortisol was normal, but could not see results of Anti insulin Ab, Order Again  CAD: S/P cabg On medications, stable, monitored.  Gout: ankle pain improved s/p colchiceine regimen.  Hypothyroidism: Continue synthroid 50 mcg po daily, asymptomatic. FU TFTs  HTN: Continue treatment, monitoring, Primary team FU  HLD; on statin  CKD: Continue monitoring labs, renal FU

## 2019-09-01 NOTE — PROGRESS NOTE ADULT - ATTENDING COMMENTS
Will  increase  Levemir 65u AM, 75u PM and Novolog  50 u before meal (via phone order to pharmacy), continue correction scale coverage. Discussed with primary team. Will continue monitoring FS, log, and follow up. Resistance due poor absorption of insulin ( Lipodisthrophy ) Consider putting patient on insulin pump.

## 2019-09-01 NOTE — PROGRESS NOTE ADULT - ASSESSMENT
Pt is a 72 yo woman with PMHx of HTN, T2DM, CAD s/p CABG (LIMA to LAD, SVG to OM, SVG to PDA 2014 at [facility]), non-dilated ICM (EF 20-25%), severe mitral regurgitation s/p mitral clip (6/13/19 Dr. Newman), severe pulm HTN, CKD, hypothyroidism admitted with worsening SOB.    A/P:  MERVIN  Likely due to cardiogenic shock  Renal function remains stable   subject to fluctuations based on Renal Perfusion.  Pt currently on Bumex 3 mg PO Q12 and spironolactone 25mg daily  - Scr currently stable   - Continue diuretic therapy per cardiology.  - Optimize glucose control  - Monitor renal function   - Avoid further nephrotoxics, NSAIDS, RCA    Hyponatremia  in the setting of hyperglycemia.   Currently stable. Monitor   Optimize glucose control    CKD stage 4:  baseline Scr 1.8-2.0. Scr stable today.   Renal function fluctuates sec to CHF  Monitor renal function at present    SOB:  in setting of HF. Improved on diuretic therapy.   Continue diuretics per cardiology    Acidosis   Sec to Renal failure  on sodium bicarb 650mg BID  Monitor serum Co2     Hypokalemia:  in the setting of diuretic therapy.   Serum potassium stable      Anemia:   Hb stable  Pt with iron deficiency anemia.

## 2019-09-02 LAB
ANION GAP SERPL CALC-SCNC: 20 MMOL/L — HIGH (ref 5–17)
BUN SERPL-MCNC: 101 MG/DL — HIGH (ref 7–23)
CALCIUM SERPL-MCNC: 10.2 MG/DL — SIGNIFICANT CHANGE UP (ref 8.4–10.5)
CHLORIDE SERPL-SCNC: 100 MMOL/L — SIGNIFICANT CHANGE UP (ref 96–108)
CO2 SERPL-SCNC: 18 MMOL/L — LOW (ref 22–31)
CREAT SERPL-MCNC: 2.05 MG/DL — HIGH (ref 0.5–1.3)
GLUCOSE BLDC GLUCOMTR-MCNC: 229 MG/DL — HIGH (ref 70–99)
GLUCOSE BLDC GLUCOMTR-MCNC: 248 MG/DL — HIGH (ref 70–99)
GLUCOSE BLDC GLUCOMTR-MCNC: 263 MG/DL — HIGH (ref 70–99)
GLUCOSE BLDC GLUCOMTR-MCNC: 288 MG/DL — HIGH (ref 70–99)
GLUCOSE BLDC GLUCOMTR-MCNC: 300 MG/DL — HIGH (ref 70–99)
GLUCOSE BLDC GLUCOMTR-MCNC: 337 MG/DL — HIGH (ref 70–99)
GLUCOSE SERPL-MCNC: 168 MG/DL — HIGH (ref 70–99)
HCT VFR BLD CALC: 28 % — LOW (ref 34.5–45)
HGB BLD-MCNC: 9.1 G/DL — LOW (ref 11.5–15.5)
MCHC RBC-ENTMCNC: 28.7 PG — SIGNIFICANT CHANGE UP (ref 27–34)
MCHC RBC-ENTMCNC: 32.5 GM/DL — SIGNIFICANT CHANGE UP (ref 32–36)
MCV RBC AUTO: 88.3 FL — SIGNIFICANT CHANGE UP (ref 80–100)
PLATELET # BLD AUTO: 353 K/UL — SIGNIFICANT CHANGE UP (ref 150–400)
POTASSIUM SERPL-MCNC: 3.8 MMOL/L — SIGNIFICANT CHANGE UP (ref 3.5–5.3)
POTASSIUM SERPL-SCNC: 3.8 MMOL/L — SIGNIFICANT CHANGE UP (ref 3.5–5.3)
RBC # BLD: 3.17 M/UL — LOW (ref 3.8–5.2)
RBC # FLD: 16.3 % — HIGH (ref 10.3–14.5)
SODIUM SERPL-SCNC: 138 MMOL/L — SIGNIFICANT CHANGE UP (ref 135–145)
WBC # BLD: 7.38 K/UL — SIGNIFICANT CHANGE UP (ref 3.8–10.5)
WBC # FLD AUTO: 7.38 K/UL — SIGNIFICANT CHANGE UP (ref 3.8–10.5)

## 2019-09-02 RX ORDER — INSULIN DETEMIR 100/ML (3)
75 INSULIN PEN (ML) SUBCUTANEOUS DAILY
Refills: 0 | Status: DISCONTINUED | OUTPATIENT
Start: 2019-09-02 | End: 2019-09-05

## 2019-09-02 RX ORDER — INSULIN ASPART 100 [IU]/ML
56 INJECTION, SOLUTION SUBCUTANEOUS
Refills: 0 | Status: DISCONTINUED | OUTPATIENT
Start: 2019-09-02 | End: 2019-09-03

## 2019-09-02 RX ORDER — INSULIN DETEMIR 100/ML (3)
84 INSULIN PEN (ML) SUBCUTANEOUS AT BEDTIME
Refills: 0 | Status: DISCONTINUED | OUTPATIENT
Start: 2019-09-02 | End: 2019-09-03

## 2019-09-02 RX ADMIN — CLOPIDOGREL BISULFATE 75 MILLIGRAM(S): 75 TABLET, FILM COATED ORAL at 12:20

## 2019-09-02 RX ADMIN — Medication 650 MILLIGRAM(S): at 17:05

## 2019-09-02 RX ADMIN — SENNA PLUS 2 TABLET(S): 8.6 TABLET ORAL at 21:57

## 2019-09-02 RX ADMIN — BUMETANIDE 3 MILLIGRAM(S): 0.25 INJECTION INTRAMUSCULAR; INTRAVENOUS at 17:05

## 2019-09-02 RX ADMIN — LIDOCAINE 1 PATCH: 4 CREAM TOPICAL at 10:00

## 2019-09-02 RX ADMIN — MONTELUKAST 10 MILLIGRAM(S): 4 TABLET, CHEWABLE ORAL at 12:20

## 2019-09-02 RX ADMIN — HEPARIN SODIUM 5000 UNIT(S): 5000 INJECTION INTRAVENOUS; SUBCUTANEOUS at 05:35

## 2019-09-02 RX ADMIN — INSULIN ASPART 50 UNIT(S): 100 INJECTION, SOLUTION SUBCUTANEOUS at 12:20

## 2019-09-02 RX ADMIN — HEPARIN SODIUM 5000 UNIT(S): 5000 INJECTION INTRAVENOUS; SUBCUTANEOUS at 17:05

## 2019-09-02 RX ADMIN — SPIRONOLACTONE 25 MILLIGRAM(S): 25 TABLET, FILM COATED ORAL at 05:36

## 2019-09-02 RX ADMIN — INSULIN ASPART 50 UNIT(S): 100 INJECTION, SOLUTION SUBCUTANEOUS at 08:57

## 2019-09-02 RX ADMIN — Medication 100 MILLIGRAM(S): at 05:36

## 2019-09-02 RX ADMIN — Medication 0.25 MILLIGRAM(S): at 09:21

## 2019-09-02 RX ADMIN — BUMETANIDE 3 MILLIGRAM(S): 0.25 INJECTION INTRAMUSCULAR; INTRAVENOUS at 05:36

## 2019-09-02 RX ADMIN — INSULIN ASPART 2: 100 INJECTION, SOLUTION SUBCUTANEOUS at 21:57

## 2019-09-02 RX ADMIN — Medication 0.25 MILLIGRAM(S): at 22:34

## 2019-09-02 RX ADMIN — INSULIN ASPART 6: 100 INJECTION, SOLUTION SUBCUTANEOUS at 17:06

## 2019-09-02 RX ADMIN — Medication 3 MILLILITER(S): at 17:14

## 2019-09-02 RX ADMIN — Medication 650 MILLIGRAM(S): at 05:36

## 2019-09-02 RX ADMIN — Medication 50 MICROGRAM(S): at 05:36

## 2019-09-02 RX ADMIN — ISOSORBIDE DINITRATE 30 MILLIGRAM(S): 5 TABLET ORAL at 14:55

## 2019-09-02 RX ADMIN — Medication 0.6 MILLIGRAM(S): at 12:20

## 2019-09-02 RX ADMIN — Medication 50 MILLIGRAM(S): at 14:55

## 2019-09-02 RX ADMIN — ISOSORBIDE DINITRATE 30 MILLIGRAM(S): 5 TABLET ORAL at 21:57

## 2019-09-02 RX ADMIN — Medication 1 TABLET(S): at 12:20

## 2019-09-02 RX ADMIN — LIDOCAINE 1 PATCH: 4 CREAM TOPICAL at 07:00

## 2019-09-02 RX ADMIN — Medication 65 UNIT(S): at 08:55

## 2019-09-02 RX ADMIN — INSULIN ASPART 8: 100 INJECTION, SOLUTION SUBCUTANEOUS at 08:56

## 2019-09-02 RX ADMIN — Medication 100 MILLIGRAM(S): at 17:05

## 2019-09-02 RX ADMIN — LIDOCAINE 1 PATCH: 4 CREAM TOPICAL at 21:57

## 2019-09-02 RX ADMIN — Medication 50 MILLIGRAM(S): at 21:57

## 2019-09-02 RX ADMIN — Medication 50 MILLIGRAM(S): at 05:35

## 2019-09-02 RX ADMIN — INSULIN ASPART 56 UNIT(S): 100 INJECTION, SOLUTION SUBCUTANEOUS at 17:05

## 2019-09-02 RX ADMIN — Medication 84 UNIT(S): at 22:40

## 2019-09-02 RX ADMIN — Medication 81 MILLIGRAM(S): at 12:20

## 2019-09-02 RX ADMIN — ATORVASTATIN CALCIUM 40 MILLIGRAM(S): 80 TABLET, FILM COATED ORAL at 21:57

## 2019-09-02 RX ADMIN — Medication 25 MILLIGRAM(S): at 05:39

## 2019-09-02 RX ADMIN — ISOSORBIDE DINITRATE 30 MILLIGRAM(S): 5 TABLET ORAL at 05:36

## 2019-09-02 RX ADMIN — Medication 3 MILLIGRAM(S): at 21:57

## 2019-09-02 RX ADMIN — POLYETHYLENE GLYCOL 3350 17 GRAM(S): 17 POWDER, FOR SOLUTION ORAL at 12:20

## 2019-09-02 RX ADMIN — INSULIN ASPART 6: 100 INJECTION, SOLUTION SUBCUTANEOUS at 12:20

## 2019-09-02 RX ADMIN — PANTOPRAZOLE SODIUM 40 MILLIGRAM(S): 20 TABLET, DELAYED RELEASE ORAL at 05:40

## 2019-09-02 NOTE — PROGRESS NOTE ADULT - SUBJECTIVE AND OBJECTIVE BOX
CHIEF COMPLAINT:    SUBJECTIVE:     REVIEW OF SYSTEMS:    CONSTITUTIONAL: (  )  weakness,  (  ) fevers or chills  EYES/ENT: (  )visual changes;     NECK: (  ) pain or stiffness  RESPIRATORY:   (  )cough, wheezing, hemoptysis;  (  ) shortness of breath  CARDIOVASCULAR:  (  )chest pain or palpitations  GASTROINTESTINAL:   (  )abdominal or epigastric pain.  (  ) nausea, vomiting, or hematemesis;   (   ) diarrhea or constipation.   GENITOURINARY:   (    ) dysuria, frequency or hematuria  NEUROLOGICAL:  (   ) numbness or weakness   All other review of systems is negative unless indicated above    Vital Signs Last 24 Hrs  T(C): 36.7 (02 Sep 2019 04:27), Max: 36.8 (01 Sep 2019 20:20)  T(F): 98.1 (02 Sep 2019 04:27), Max: 98.2 (01 Sep 2019 20:20)  HR: 83 (02 Sep 2019 05:30) (79 - 93)  BP: 109/69 (02 Sep 2019 05:30) (99/61 - 113/69)  BP(mean): --  RR: 18 (02 Sep 2019 04:27) (18 - 18)  SpO2: 99% (02 Sep 2019 04:27) (98% - 100%)    I&O's Summary    01 Sep 2019 07:01  -  02 Sep 2019 07:00  --------------------------------------------------------  IN: 480 mL / OUT: 1650 mL / NET: -1170 mL        CAPILLARY BLOOD GLUCOSE      POCT Blood Glucose.: 229 mg/dL (02 Sep 2019 07:40)  POCT Blood Glucose.: 253 mg/dL (01 Sep 2019 21:14)  POCT Blood Glucose.: 280 mg/dL (01 Sep 2019 16:29)  POCT Blood Glucose.: 345 mg/dL (01 Sep 2019 11:56)  POCT Blood Glucose.: 321 mg/dL (01 Sep 2019 09:16)      PHYSICAL EXAM:    Constitutional:  (   ) NAD,   (   )awake and alert  HEENT: PERR, EOMI,    Neck: Soft and supple, No LAD, No JVD  Respiratory:  (    Breath sounds are clear bilaterally,    (   ) wheezing, rales or rhonchi  Cardiovascular:     (   )S1 and S2, regular rate and rhythm, no Murmurs, gallops or rubs  Gastrointestinal:  (   )Bowel Sounds present, soft,   (  )nontender, nondistended,    Extremities:    (  ) peripheral edema  Vascular: 2+ peripheral pulses  Neurological:    (    )A/O x 3,   (  ) focal deficits  Musculoskeletal:    (   )  normal strength b/l upper  (     ) normal  lower extremities  Skin: No rashes    MEDICATIONS:  MEDICATIONS  (STANDING):  acetaminophen  IVPB .. 1000 milliGRAM(s) IV Intermittent once  aspirin enteric coated 81 milliGRAM(s) Oral daily  atorvastatin 40 milliGRAM(s) Oral at bedtime  buMETAnide 3 milliGRAM(s) Oral every 12 hours  chlorhexidine 2% Cloths 1 Application(s) Topical daily  clopidogrel Tablet 75 milliGRAM(s) Oral daily  colchicine 0.6 milliGRAM(s) Oral daily  dextrose 5%. 1000 milliLiter(s) (50 mL/Hr) IV Continuous <Continuous>  dextrose 50% Injectable 12.5 Gram(s) IV Push once  dextrose 50% Injectable 25 Gram(s) IV Push once  dextrose 50% Injectable 25 Gram(s) IV Push once  docusate sodium 100 milliGRAM(s) Oral two times a day  heparin  Injectable 5000 Unit(s) SubCutaneous every 12 hours  hydrALAZINE 50 milliGRAM(s) Oral every 8 hours  insulin aspart (NovoLOG) corrective regimen sliding scale.   SubCutaneous three times a day before meals  insulin aspart (NovoLOG) corrective regimen sliding scale.   SubCutaneous at bedtime  insulin aspart Injectable (NovoLOG) 50 Unit(s) SubCutaneous three times a day before meals  insulin detemir injectable (LEVEMIR) 65 Unit(s) SubCutaneous daily  insulin detemir injectable (LEVEMIR) 75 Unit(s) SubCutaneous at bedtime  isosorbide   dinitrate Tablet (ISORDIL) 30 milliGRAM(s) Oral three times a day  lactated ringers. 1000 milliLiter(s) (75 mL/Hr) IV Continuous <Continuous>  levothyroxine 50 MICROGram(s) Oral daily  lidocaine   Patch 1 Patch Transdermal daily  melatonin 3 milliGRAM(s) Oral at bedtime  metoprolol succinate ER 25 milliGRAM(s) Oral daily  montelukast 10 milliGRAM(s) Oral daily  Nephro-enrico 1 Tablet(s) Oral daily  pantoprazole    Tablet 40 milliGRAM(s) Oral before breakfast  polyethylene glycol 3350 17 Gram(s) Oral daily  senna 2 Tablet(s) Oral at bedtime  sodium bicarbonate 650 milliGRAM(s) Oral two times a day  spironolactone 25 milliGRAM(s) Oral daily      LABS: All Labs Reviewed:                        8.9    7.81  )-----------( 370      ( 01 Sep 2019 09:31 )             27.4     09-02    138  |  100  |  101<H>  ----------------------------<  168<H>  3.8   |  18<L>  |  2.05<H>    Ca    10.2      02 Sep 2019 06:49            Blood Culture:   Urine Culture      RADIOLOGY/EKG:    ASSESSMENT AND PLAN:    DVT PPX:    ADVANCED DIRECTIVE:    DISPOSITION: CHIEF COMPLAINT:  patient awake and verbal eating her breakfast without complaint she is asking when she can go home?  SUBJECTIVE:     REVIEW OF SYSTEMS:    CONSTITUTIONAL: (x  )  weakness,  (  ) fevers or chills  EYES/ENT: (  )visual changes;     NECK: (  ) pain or stiffness  RESPIRATORY:   (  )cough, wheezing, hemoptysis;  (  ) shortness of breath  CARDIOVASCULAR:  (  )chest pain or palpitations  GASTROINTESTINAL:   (  )abdominal or epigastric pain.  (  ) nausea, vomiting, or hematemesis;   (   ) diarrhea or constipation.   GENITOURINARY:   (    ) dysuria, frequency or hematuria  NEUROLOGICAL:  (   ) numbness or weakness   All other review of systems is negative unless indicated above    Vital Signs Last 24 Hrs  T(C): 36.7 (02 Sep 2019 04:27), Max: 36.8 (01 Sep 2019 20:20)  T(F): 98.1 (02 Sep 2019 04:27), Max: 98.2 (01 Sep 2019 20:20)  HR: 83 (02 Sep 2019 05:30) (79 - 93)  BP: 109/69 (02 Sep 2019 05:30) (99/61 - 113/69)  BP(mean): --  RR: 18 (02 Sep 2019 04:27) (18 - 18)  SpO2: 99% (02 Sep 2019 04:27) (98% - 100%)    I&O's Summary    01 Sep 2019 07:01  -  02 Sep 2019 07:00  --------------------------------------------------------  IN: 480 mL / OUT: 1650 mL / NET: -1170 mL        CAPILLARY BLOOD GLUCOSE      POCT Blood Glucose.: 229 mg/dL (02 Sep 2019 07:40)  POCT Blood Glucose.: 253 mg/dL (01 Sep 2019 21:14)  POCT Blood Glucose.: 280 mg/dL (01 Sep 2019 16:29)  POCT Blood Glucose.: 345 mg/dL (01 Sep 2019 11:56)  POCT Blood Glucose.: 321 mg/dL (01 Sep 2019 09:16)      PHYSICAL EXAM:   Neck: No palpable thyroid nodules.  CVS: S1S2, No murmurs  Respiratory: No wheezing, no crepitations  GI: Abdomen soft, bowel sounds positive  Musculoskeletal:  edema lower extremities.   Skin: No skin rashes, no ecchymosis       MEDICATIONS:  MEDICATIONS  (STANDING):  acetaminophen  IVPB .. 1000 milliGRAM(s) IV Intermittent once  aspirin enteric coated 81 milliGRAM(s) Oral daily  atorvastatin 40 milliGRAM(s) Oral at bedtime  buMETAnide 3 milliGRAM(s) Oral every 12 hours  chlorhexidine 2% Cloths 1 Application(s) Topical daily  clopidogrel Tablet 75 milliGRAM(s) Oral daily  colchicine 0.6 milliGRAM(s) Oral daily  dextrose 5%. 1000 milliLiter(s) (50 mL/Hr) IV Continuous <Continuous>  dextrose 50% Injectable 12.5 Gram(s) IV Push once  dextrose 50% Injectable 25 Gram(s) IV Push once  dextrose 50% Injectable 25 Gram(s) IV Push once  docusate sodium 100 milliGRAM(s) Oral two times a day  heparin  Injectable 5000 Unit(s) SubCutaneous every 12 hours  hydrALAZINE 50 milliGRAM(s) Oral every 8 hours  insulin aspart (NovoLOG) corrective regimen sliding scale.   SubCutaneous three times a day before meals  insulin aspart (NovoLOG) corrective regimen sliding scale.   SubCutaneous at bedtime  insulin aspart Injectable (NovoLOG) 50 Unit(s) SubCutaneous three times a day before meals  insulin detemir injectable (LEVEMIR) 65 Unit(s) SubCutaneous daily  insulin detemir injectable (LEVEMIR) 75 Unit(s) SubCutaneous at bedtime  isosorbide   dinitrate Tablet (ISORDIL) 30 milliGRAM(s) Oral three times a day  lactated ringers. 1000 milliLiter(s) (75 mL/Hr) IV Continuous <Continuous>  levothyroxine 50 MICROGram(s) Oral daily  lidocaine   Patch 1 Patch Transdermal daily  melatonin 3 milliGRAM(s) Oral at bedtime  metoprolol succinate ER 25 milliGRAM(s) Oral daily  montelukast 10 milliGRAM(s) Oral daily  Nephro-enrico 1 Tablet(s) Oral daily  pantoprazole    Tablet 40 milliGRAM(s) Oral before breakfast  polyethylene glycol 3350 17 Gram(s) Oral daily  senna 2 Tablet(s) Oral at bedtime  sodium bicarbonate 650 milliGRAM(s) Oral two times a day  spironolactone 25 milliGRAM(s) Oral daily      LABS: All Labs Reviewed:                        8.9    7.81  )-----------( 370      ( 01 Sep 2019 09:31 )             27.4     09-02    138  |  100  |  101<H>  ----------------------------<  168<H>  3.8   |  18<L>  |  2.05<H>    Ca    10.2      02 Sep 2019 06:49            Blood Culture:   Urine Culture      RADIOLOGY/EKG:    ASSESSMENT AND PLAN:  patient condition remained stable glycemic control  was improving but  Prandin was discontinued and patient was started on Levemir unfortunately the pharmacy did not approve U5 [id number]  This case was discussed in with medical team in detail     #diabetes continue      insulin aspart (NovoLOG) corrective regimen sliding scale.   SubCutaneous three times a day before meals  insulin aspart (NovoLOG) corrective regimen sliding scale.   SubCutaneous at bedtime  insulin aspart Injectable (NovoLOG) 50 Unit(s) SubCutaneous three times a day before meals  insulin detemir injectable (LEVEMIR) 65 Unit(s) SubCutaneous daily  insulin detemir injectable (LEVEMIR) 75 Unit(s) SubCutaneous at bedtime                    Family wants to take her home over all she is high risk for readmission and intubation at home she is full code at present time  discussed with  endocrinologist : Adjust her insulin to higher dose but eventually she needs insulin pump    DVT PPX:    ADVANCED DIRECTIVE:    DISPOSITION:

## 2019-09-02 NOTE — PROGRESS NOTE ADULT - ASSESSMENT
Pt is a 74 yo woman with PMHx of HTN, T2DM, CAD s/p CABG (LIMA to LAD, SVG to OM, SVG to PDA 2014 at [facility]), non-dilated ICM (EF 20-25%), severe mitral regurgitation s/p mitral clip (6/13/19 Dr. Newman), severe pulm HTN, CKD, hypothyroidism admitted with worsening SOB.    A/P:  MERVIN  Likely due to cardiogenic shock  Renal function remains stable   subject to fluctuations based on Renal Perfusion.  Pt currently on Bumex 3 mg PO Q12 and spironolactone 25mg daily  - Scr currently stable   - Continue diuretic therapy per cardiology.  - Optimize glucose control  - Monitor renal function   - Avoid further nephrotoxics, NSAIDS, RCA    Hyponatremia  in the setting of hyperglycemia.   Currently stable. Monitor   Optimize glucose control    CKD stage 4:  baseline Scr 1.8-2.0. Scr stable today.   Renal function fluctuates sec to CHF  Monitor renal function at present    SOB:  in setting of HF. Improved on diuretic therapy.   Continue diuretics per cardiology    Acidosis   Sec to Renal failure  on sodium bicarb 650mg BID  Monitor serum Co2     Hypokalemia:  in the setting of diuretic therapy.   Serum potassium stable      Anemia:   Hb stable  Pt with iron deficiency anemia.

## 2019-09-02 NOTE — PROGRESS NOTE ADULT - SUBJECTIVE AND OBJECTIVE BOX
Dr. Muhammad  Office [phone]  Cell [phone]  [facility]  Cell [phone]      Patient is a 73y old  Female who presents with a chief complaint of Hypoxia, SOB (02 Sep 2019 08:06)      Patient seen and examined at bedside. No chest pain/sob    VITALS:  T(F): 98.6 (09-02-19 @ 11:42), Max: 98.6 (09-02-19 @ 11:42)  HR: 91 (09-02-19 @ 17:03)  BP: 107/59 (09-02-19 @ 17:03)  RR: 18 (09-02-19 @ 17:03)  SpO2: 98% (09-02-19 @ 17:03)  Wt(kg): --    09-01 @ 07:01  -  09-02 @ 07:00  --------------------------------------------------------  IN: 480 mL / OUT: 1650 mL / NET: -1170 mL    09-02 @ 07:01  -  09-02 @ 18:21  --------------------------------------------------------  IN: 540 mL / OUT: 800 mL / NET: -260 mL          PHYSICAL EXAM:  Constitutional: NAD  Neck: No JVD  Respiratory: CTAB, no wheezes, rales or rhonchi  Cardiovascular: S1, S2, RRR  Gastrointestinal: BS+, soft, NT/ND  Extremities: No peripheral edema    Hospital Medications:   MEDICATIONS  (STANDING):  acetaminophen  IVPB .. 1000 milliGRAM(s) IV Intermittent once  aspirin enteric coated 81 milliGRAM(s) Oral daily  atorvastatin 40 milliGRAM(s) Oral at bedtime  buMETAnide 3 milliGRAM(s) Oral every 12 hours  chlorhexidine 2% Cloths 1 Application(s) Topical daily  clopidogrel Tablet 75 milliGRAM(s) Oral daily  colchicine 0.6 milliGRAM(s) Oral daily  dextrose 5%. 1000 milliLiter(s) (50 mL/Hr) IV Continuous <Continuous>  dextrose 50% Injectable 12.5 Gram(s) IV Push once  dextrose 50% Injectable 25 Gram(s) IV Push once  dextrose 50% Injectable 25 Gram(s) IV Push once  docusate sodium 100 milliGRAM(s) Oral two times a day  heparin  Injectable 5000 Unit(s) SubCutaneous every 12 hours  hydrALAZINE 50 milliGRAM(s) Oral every 8 hours  insulin aspart (NovoLOG) corrective regimen sliding scale.   SubCutaneous three times a day before meals  insulin aspart (NovoLOG) corrective regimen sliding scale.   SubCutaneous at bedtime  insulin aspart Injectable (NovoLOG) 56 Unit(s) SubCutaneous three times a day before meals  insulin detemir injectable (LEVEMIR) 65 Unit(s) SubCutaneous daily  insulin detemir injectable (LEVEMIR) 75 Unit(s) SubCutaneous at bedtime  isosorbide   dinitrate Tablet (ISORDIL) 30 milliGRAM(s) Oral three times a day  lactated ringers. 1000 milliLiter(s) (75 mL/Hr) IV Continuous <Continuous>  levothyroxine 50 MICROGram(s) Oral daily  lidocaine   Patch 1 Patch Transdermal daily  melatonin 3 milliGRAM(s) Oral at bedtime  metoprolol succinate ER 25 milliGRAM(s) Oral daily  montelukast 10 milliGRAM(s) Oral daily  Nephro-enrico 1 Tablet(s) Oral daily  pantoprazole    Tablet 40 milliGRAM(s) Oral before breakfast  polyethylene glycol 3350 17 Gram(s) Oral daily  senna 2 Tablet(s) Oral at bedtime  sodium bicarbonate 650 milliGRAM(s) Oral two times a day  spironolactone 25 milliGRAM(s) Oral daily      LABS:  09-02    138  |  100  |  101<H>  ----------------------------<  168<H>  3.8   |  18<L>  |  2.05<H>    Ca    10.2      02 Sep 2019 06:49      Creatinine Trend: 2.05 <--, 2.14 <--, 2.06 <--, 2.01 <--, 1.94 <--, 2.09 <--, 2.10 <--                                9.1    7.38  )-----------( 353      ( 02 Sep 2019 09:02 )             28.0     Urine Studies:  Urinalysis - [08-22-19 @ 16:43]      Color Light Yellow / Appearance Clear / SG 1.020 / pH 6.0      Gluc >=1000 mg/dL / Ketone Negative  / Bili Negative / Urobili <2 mg/dL       Blood Negative / Protein Negative / Leuk Est Negative / Nitrite Negative      RBC  / WBC  / Hyaline  / Gran  / Sq Epi  / Non Sq Epi  / Bacteria       Iron 42, TIBC 348, %sat 12      [07-05-19 @ 22:32]  Ferritin 130      [07-05-19 @ 22:32]  .4 (Ca --)      [04-25-19 @ 05:45]   --  PTH 43.55 (Ca --)      [09-10-18 @ 07:15]   --  PTH 36.60 (Ca --)      [09-08-18 @ 03:15]   --  Vitamin D (25OH) 25.9      [05-01-19 @ 04:00]  HbA1c 7.4      [07-05-19 @ 22:32]  TSH 1.02      [08-26-19 @ 08:22]  Lipid: chol 148, , HDL 35,       [06-01-19 @ 08:00]        RADIOLOGY & ADDITIONAL STUDIES:

## 2019-09-02 NOTE — PROGRESS NOTE ADULT - SUBJECTIVE AND OBJECTIVE BOX
Chief complaint  Patient is a 73y old  Female who presents with a chief complaint of Hypoxia, SOB (02 Sep 2019 18:21)   Review of systems  Patient in bed, looks comfortable, no fever, no hypoglycemia.    Labs and Fingersticks  CAPILLARY BLOOD GLUCOSE      POCT Blood Glucose.: 263 mg/dL (02 Sep 2019 16:28)  POCT Blood Glucose.: 288 mg/dL (02 Sep 2019 11:50)  POCT Blood Glucose.: 337 mg/dL (02 Sep 2019 08:54)  POCT Blood Glucose.: 229 mg/dL (02 Sep 2019 07:40)  POCT Blood Glucose.: 253 mg/dL (01 Sep 2019 21:14)      Anion Gap, Serum: 20 <H> (09-02 @ 06:49)  Anion Gap, Serum: 17 (09-01 @ 06:24)      Calcium, Total Serum: 10.2 (09-02 @ 06:49)  Calcium, Total Serum: 9.9 (09-01 @ 06:24)          09-02    138  |  100  |  101<H>  ----------------------------<  168<H>  3.8   |  18<L>  |  2.05<H>    Ca    10.2      02 Sep 2019 06:49                          9.1    7.38  )-----------( 353      ( 02 Sep 2019 09:02 )             28.0     Medications  MEDICATIONS  (STANDING):  acetaminophen  IVPB .. 1000 milliGRAM(s) IV Intermittent once  aspirin enteric coated 81 milliGRAM(s) Oral daily  atorvastatin 40 milliGRAM(s) Oral at bedtime  buMETAnide 3 milliGRAM(s) Oral every 12 hours  chlorhexidine 2% Cloths 1 Application(s) Topical daily  clopidogrel Tablet 75 milliGRAM(s) Oral daily  colchicine 0.6 milliGRAM(s) Oral daily  dextrose 5%. 1000 milliLiter(s) (50 mL/Hr) IV Continuous <Continuous>  dextrose 50% Injectable 12.5 Gram(s) IV Push once  dextrose 50% Injectable 25 Gram(s) IV Push once  dextrose 50% Injectable 25 Gram(s) IV Push once  docusate sodium 100 milliGRAM(s) Oral two times a day  heparin  Injectable 5000 Unit(s) SubCutaneous every 12 hours  hydrALAZINE 50 milliGRAM(s) Oral every 8 hours  insulin aspart (NovoLOG) corrective regimen sliding scale.   SubCutaneous three times a day before meals  insulin aspart (NovoLOG) corrective regimen sliding scale.   SubCutaneous at bedtime  insulin aspart Injectable (NovoLOG) 56 Unit(s) SubCutaneous three times a day before meals  insulin detemir injectable (LEVEMIR) 75 Unit(s) SubCutaneous daily  insulin detemir injectable (LEVEMIR) 84 Unit(s) SubCutaneous at bedtime  isosorbide   dinitrate Tablet (ISORDIL) 30 milliGRAM(s) Oral three times a day  lactated ringers. 1000 milliLiter(s) (75 mL/Hr) IV Continuous <Continuous>  levothyroxine 50 MICROGram(s) Oral daily  lidocaine   Patch 1 Patch Transdermal daily  melatonin 3 milliGRAM(s) Oral at bedtime  metoprolol succinate ER 25 milliGRAM(s) Oral daily  montelukast 10 milliGRAM(s) Oral daily  Nephro-enrico 1 Tablet(s) Oral daily  pantoprazole    Tablet 40 milliGRAM(s) Oral before breakfast  polyethylene glycol 3350 17 Gram(s) Oral daily  senna 2 Tablet(s) Oral at bedtime  sodium bicarbonate 650 milliGRAM(s) Oral two times a day  spironolactone 25 milliGRAM(s) Oral daily      Physical Exam  General: Patient comfortable in bed  Vital Signs Last 12 Hrs  T(F): 98.6 (09-02-19 @ 11:42), Max: 98.6 (09-02-19 @ 11:42)  HR: 91 (09-02-19 @ 17:03) (76 - 94)  BP: 107/59 (09-02-19 @ 17:03) (93/56 - 118/69)  BP(mean): --  RR: 18 (09-02-19 @ 17:03) (18 - 19)  SpO2: 98% (09-02-19 @ 17:03) (91% - 100%)  Neck: No palpable thyroid nodules.  CVS: S1S2, No murmurs  Respiratory: No wheezing, no crepitations  GI: Abdomen soft, bowel sounds positive  Musculoskeletal:  edema lower extremities.   Skin: No skin rashes, no ecchymosis    Diagnostics    Cortisol AM, Serum: AM Sched. Collection: 18-Aug-2019 06:00 (08-17 @ 12:14)  Cortisol AM, Serum: AM Sched. Collection: 19-Aug-2019 06:00 (08-18 @ 12:59)  Thyroid Stimulating Hormone, Serum: AM Sched. Collection: 26-Aug-2019 06:00 (08-25 @ 12:31)  Free Thyroxine, Serum: AM Sched. Collection: 26-Aug-2019 06:00 (08-25 @ 12:31)

## 2019-09-02 NOTE — PROGRESS NOTE ADULT - ASSESSMENT
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 She is eating meals, on large dose basal bolus insulin.  CAD: S/P cabg On medications, stable, monitored.  Gout: colchiceine regimen completed as per rheum recommendations.  Hypothyroidism: synthroid 50 mcg po daily, asymptomatic.  HTN: Controlled, On med.  HLD; on statin  CKD: Monitor labs/BMP      HTN: Continue treatment, monitoring, Primary team FU  HLD; on statin  CKD: Continue monitoring labs, renal FU

## 2019-09-02 NOTE — PROGRESS NOTE ADULT - PROBLEM SELECTOR PLAN 1
Will  increase  Levemir 75u AM, 84u PM and Novolog  56u before meal  (via phone order to pharmacy), continue correction scale coverage. Will continue monitoring FS, log, and follow up.  Consider putting patient on insulin pump.

## 2019-09-03 LAB
ANION GAP SERPL CALC-SCNC: 18 MMOL/L — HIGH (ref 5–17)
ANION GAP SERPL CALC-SCNC: 19 MMOL/L — HIGH (ref 5–17)
BUN SERPL-MCNC: 100 MG/DL — HIGH (ref 7–23)
BUN SERPL-MCNC: 105 MG/DL — HIGH (ref 7–23)
CALCIUM SERPL-MCNC: 9.7 MG/DL — SIGNIFICANT CHANGE UP (ref 8.4–10.5)
CALCIUM SERPL-MCNC: 9.8 MG/DL — SIGNIFICANT CHANGE UP (ref 8.4–10.5)
CHLORIDE SERPL-SCNC: 99 MMOL/L — SIGNIFICANT CHANGE UP (ref 96–108)
CHLORIDE SERPL-SCNC: 99 MMOL/L — SIGNIFICANT CHANGE UP (ref 96–108)
CO2 SERPL-SCNC: 18 MMOL/L — LOW (ref 22–31)
CO2 SERPL-SCNC: 18 MMOL/L — LOW (ref 22–31)
CREAT SERPL-MCNC: 2.14 MG/DL — HIGH (ref 0.5–1.3)
CREAT SERPL-MCNC: 2.15 MG/DL — HIGH (ref 0.5–1.3)
GLUCOSE BLDC GLUCOMTR-MCNC: 215 MG/DL — HIGH (ref 70–99)
GLUCOSE BLDC GLUCOMTR-MCNC: 223 MG/DL — HIGH (ref 70–99)
GLUCOSE BLDC GLUCOMTR-MCNC: 254 MG/DL — HIGH (ref 70–99)
GLUCOSE BLDC GLUCOMTR-MCNC: 268 MG/DL — HIGH (ref 70–99)
GLUCOSE BLDC GLUCOMTR-MCNC: 393 MG/DL — HIGH (ref 70–99)
GLUCOSE SERPL-MCNC: 252 MG/DL — HIGH (ref 70–99)
GLUCOSE SERPL-MCNC: 259 MG/DL — HIGH (ref 70–99)
HCT VFR BLD CALC: 26.1 % — LOW (ref 34.5–45)
HGB BLD-MCNC: 9.1 G/DL — LOW (ref 11.5–15.5)
MCHC RBC-ENTMCNC: 29.7 PG — SIGNIFICANT CHANGE UP (ref 27–34)
MCHC RBC-ENTMCNC: 34.7 GM/DL — SIGNIFICANT CHANGE UP (ref 32–36)
MCV RBC AUTO: 85.5 FL — SIGNIFICANT CHANGE UP (ref 80–100)
PLATELET # BLD AUTO: 342 K/UL — SIGNIFICANT CHANGE UP (ref 150–400)
POTASSIUM SERPL-MCNC: 3.9 MMOL/L — SIGNIFICANT CHANGE UP (ref 3.5–5.3)
POTASSIUM SERPL-MCNC: 4 MMOL/L — SIGNIFICANT CHANGE UP (ref 3.5–5.3)
POTASSIUM SERPL-SCNC: 3.9 MMOL/L — SIGNIFICANT CHANGE UP (ref 3.5–5.3)
POTASSIUM SERPL-SCNC: 4 MMOL/L — SIGNIFICANT CHANGE UP (ref 3.5–5.3)
RBC # BLD: 3.05 M/UL — LOW (ref 3.8–5.2)
RBC # FLD: 16.4 % — HIGH (ref 10.3–14.5)
SODIUM SERPL-SCNC: 135 MMOL/L — SIGNIFICANT CHANGE UP (ref 135–145)
SODIUM SERPL-SCNC: 136 MMOL/L — SIGNIFICANT CHANGE UP (ref 135–145)
WBC # BLD: 6.7 K/UL — SIGNIFICANT CHANGE UP (ref 3.8–10.5)
WBC # FLD AUTO: 6.7 K/UL — SIGNIFICANT CHANGE UP (ref 3.8–10.5)

## 2019-09-03 RX ORDER — ALPRAZOLAM 0.25 MG
0.25 TABLET ORAL ONCE
Refills: 0 | Status: DISCONTINUED | OUTPATIENT
Start: 2019-09-03 | End: 2019-09-03

## 2019-09-03 RX ORDER — INSULIN ASPART 100 [IU]/ML
60 INJECTION, SOLUTION SUBCUTANEOUS
Refills: 0 | Status: DISCONTINUED | OUTPATIENT
Start: 2019-09-03 | End: 2019-09-05

## 2019-09-03 RX ORDER — INSULIN DETEMIR 100/ML (3)
90 INSULIN PEN (ML) SUBCUTANEOUS AT BEDTIME
Refills: 0 | Status: DISCONTINUED | OUTPATIENT
Start: 2019-09-03 | End: 2019-09-05

## 2019-09-03 RX ADMIN — Medication 3 MILLIGRAM(S): at 22:05

## 2019-09-03 RX ADMIN — INSULIN ASPART 56 UNIT(S): 100 INJECTION, SOLUTION SUBCUTANEOUS at 07:57

## 2019-09-03 RX ADMIN — Medication 100 MILLIGRAM(S): at 06:45

## 2019-09-03 RX ADMIN — Medication 25 MILLIGRAM(S): at 06:46

## 2019-09-03 RX ADMIN — Medication 0.6 MILLIGRAM(S): at 07:59

## 2019-09-03 RX ADMIN — LIDOCAINE 1 PATCH: 4 CREAM TOPICAL at 07:52

## 2019-09-03 RX ADMIN — Medication 650 MILLIGRAM(S): at 06:46

## 2019-09-03 RX ADMIN — ISOSORBIDE DINITRATE 30 MILLIGRAM(S): 5 TABLET ORAL at 13:11

## 2019-09-03 RX ADMIN — Medication 75 UNIT(S): at 08:35

## 2019-09-03 RX ADMIN — INSULIN ASPART 60 UNIT(S): 100 INJECTION, SOLUTION SUBCUTANEOUS at 18:06

## 2019-09-03 RX ADMIN — SENNA PLUS 2 TABLET(S): 8.6 TABLET ORAL at 22:05

## 2019-09-03 RX ADMIN — BUMETANIDE 3 MILLIGRAM(S): 0.25 INJECTION INTRAMUSCULAR; INTRAVENOUS at 08:11

## 2019-09-03 RX ADMIN — Medication 50 MILLIGRAM(S): at 22:05

## 2019-09-03 RX ADMIN — HEPARIN SODIUM 5000 UNIT(S): 5000 INJECTION INTRAVENOUS; SUBCUTANEOUS at 18:01

## 2019-09-03 RX ADMIN — ISOSORBIDE DINITRATE 30 MILLIGRAM(S): 5 TABLET ORAL at 22:05

## 2019-09-03 RX ADMIN — CHLORHEXIDINE GLUCONATE 1 APPLICATION(S): 213 SOLUTION TOPICAL at 07:59

## 2019-09-03 RX ADMIN — LIDOCAINE 1 PATCH: 4 CREAM TOPICAL at 22:05

## 2019-09-03 RX ADMIN — MONTELUKAST 10 MILLIGRAM(S): 4 TABLET, CHEWABLE ORAL at 08:00

## 2019-09-03 RX ADMIN — PANTOPRAZOLE SODIUM 40 MILLIGRAM(S): 20 TABLET, DELAYED RELEASE ORAL at 06:46

## 2019-09-03 RX ADMIN — INSULIN ASPART 6: 100 INJECTION, SOLUTION SUBCUTANEOUS at 07:57

## 2019-09-03 RX ADMIN — Medication 1 TABLET(S): at 07:59

## 2019-09-03 RX ADMIN — POLYETHYLENE GLYCOL 3350 17 GRAM(S): 17 POWDER, FOR SOLUTION ORAL at 12:37

## 2019-09-03 RX ADMIN — Medication 50 MICROGRAM(S): at 06:45

## 2019-09-03 RX ADMIN — LIDOCAINE 1 PATCH: 4 CREAM TOPICAL at 11:16

## 2019-09-03 RX ADMIN — Medication 90 UNIT(S): at 23:03

## 2019-09-03 RX ADMIN — SPIRONOLACTONE 25 MILLIGRAM(S): 25 TABLET, FILM COATED ORAL at 06:46

## 2019-09-03 RX ADMIN — ATORVASTATIN CALCIUM 40 MILLIGRAM(S): 80 TABLET, FILM COATED ORAL at 22:05

## 2019-09-03 RX ADMIN — ISOSORBIDE DINITRATE 30 MILLIGRAM(S): 5 TABLET ORAL at 06:45

## 2019-09-03 RX ADMIN — CLOPIDOGREL BISULFATE 75 MILLIGRAM(S): 75 TABLET, FILM COATED ORAL at 07:59

## 2019-09-03 RX ADMIN — INSULIN ASPART 10: 100 INJECTION, SOLUTION SUBCUTANEOUS at 12:36

## 2019-09-03 RX ADMIN — Medication 0.25 MILLIGRAM(S): at 22:05

## 2019-09-03 RX ADMIN — INSULIN ASPART 56 UNIT(S): 100 INJECTION, SOLUTION SUBCUTANEOUS at 12:37

## 2019-09-03 RX ADMIN — Medication 81 MILLIGRAM(S): at 07:59

## 2019-09-03 RX ADMIN — Medication 0.25 MILLIGRAM(S): at 03:31

## 2019-09-03 RX ADMIN — HEPARIN SODIUM 5000 UNIT(S): 5000 INJECTION INTRAVENOUS; SUBCUTANEOUS at 06:45

## 2019-09-03 RX ADMIN — BUMETANIDE 3 MILLIGRAM(S): 0.25 INJECTION INTRAMUSCULAR; INTRAVENOUS at 18:01

## 2019-09-03 RX ADMIN — Medication 50 MILLIGRAM(S): at 13:11

## 2019-09-03 RX ADMIN — Medication 650 MILLIGRAM(S): at 18:00

## 2019-09-03 RX ADMIN — INSULIN ASPART 4: 100 INJECTION, SOLUTION SUBCUTANEOUS at 18:05

## 2019-09-03 NOTE — PROGRESS NOTE ADULT - PROBLEM SELECTOR PLAN 1
Will  increase  Levemir 90u PM, continue 74u AM and increase Novolog to 60u before each meal  (via phone order to pharmacy), continue correction scale coverage. Will continue monitoring FS, log, and follow up.  Consider putting patient on insulin pump. Will  increase  Levemir 90u PM, continue 75u AM and increase Novolog to 60u before each meal  (via phone order to pharmacy), continue correction scale coverage. Will continue monitoring FS, log, and follow up.  Consider putting patient on insulin pump.

## 2019-09-03 NOTE — PROGRESS NOTE ADULT - ASSESSMENT
Pt is a 72 yo woman with PMHx of HTN, T2DM, CAD s/p CABG (LIMA to LAD, SVG to OM, SVG to PDA 2014 at [facility]), non-dilated ICM (EF 20-25%), severe mitral regurgitation s/p mitral clip (6/13/19 Dr. Newman), severe pulm HTN, CKD, hypothyroidism admitted with worsening SOB.    A/P:  MERVIN  Likely due to cardiogenic shock  Renal function remains stable   subject to fluctuations based on Renal Perfusion.  Pt currently on Bumex 3 mg PO Q12 and spironolactone 25mg daily  - Scr currently stable   - Continue diuretic therapy per cardiology.  - Optimize glucose control  - Monitor renal function   - Avoid further nephrotoxics, NSAIDS, RCA    Hyponatremia  in the setting of hyperglycemia.   Currently stable. Monitor   Optimize glucose control    CKD stage 4:  baseline Scr 1.8-2.0. Scr stable today.   Renal function fluctuates sec to CHF  Monitor renal function at present    SOB:  in setting of HF. Improved on diuretic therapy.   Continue diuretics per cardiology    Acidosis   Sec to Renal failure  on sodium bicarb 650mg BID  Monitor serum Co2     Hypokalemia:  in the setting of diuretic therapy.   Serum potassium stable      Anemia:   Hb stable  Pt with iron deficiency anemia.

## 2019-09-03 NOTE — PROGRESS NOTE ADULT - SUBJECTIVE AND OBJECTIVE BOX
Chief complaint  Patient is a 73y old  Female who presents with a chief complaint of Hypoxia, SOB (03 Sep 2019 08:51)   Review of systems  Patient in bed, looks comfortable, no fever, no hypoglycemia.    Labs and Fingersticks  CAPILLARY BLOOD GLUCOSE      POCT Blood Glucose.: 393 mg/dL (03 Sep 2019 11:46)  POCT Blood Glucose.: 268 mg/dL (03 Sep 2019 07:41)  POCT Blood Glucose.: 248 mg/dL (02 Sep 2019 22:27)  POCT Blood Glucose.: 300 mg/dL (02 Sep 2019 21:05)  POCT Blood Glucose.: 263 mg/dL (02 Sep 2019 16:28)      Anion Gap, Serum: 18 <H> (09-03 @ 06:40)  Anion Gap, Serum: 20 <H> (09-02 @ 06:49)      Calcium, Total Serum: 9.8 (09-03 @ 06:40)  Calcium, Total Serum: 10.2 (09-02 @ 06:49)          09-03    135  |  99  |  105<H>  ----------------------------<  259<H>  3.9   |  18<L>  |  2.15<H>    Ca    9.8      03 Sep 2019 06:40                          9.1    6.7   )-----------( 342      ( 03 Sep 2019 06:40 )             26.1     Medications  MEDICATIONS  (STANDING):  acetaminophen  IVPB .. 1000 milliGRAM(s) IV Intermittent once  aspirin enteric coated 81 milliGRAM(s) Oral daily  atorvastatin 40 milliGRAM(s) Oral at bedtime  buMETAnide 3 milliGRAM(s) Oral every 12 hours  chlorhexidine 2% Cloths 1 Application(s) Topical daily  clopidogrel Tablet 75 milliGRAM(s) Oral daily  colchicine 0.6 milliGRAM(s) Oral daily  dextrose 5%. 1000 milliLiter(s) (50 mL/Hr) IV Continuous <Continuous>  dextrose 50% Injectable 12.5 Gram(s) IV Push once  dextrose 50% Injectable 25 Gram(s) IV Push once  dextrose 50% Injectable 25 Gram(s) IV Push once  docusate sodium 100 milliGRAM(s) Oral two times a day  heparin  Injectable 5000 Unit(s) SubCutaneous every 12 hours  hydrALAZINE 50 milliGRAM(s) Oral every 8 hours  insulin aspart (NovoLOG) corrective regimen sliding scale.   SubCutaneous three times a day before meals  insulin aspart (NovoLOG) corrective regimen sliding scale.   SubCutaneous at bedtime  insulin aspart Injectable (NovoLOG) 60 Unit(s) SubCutaneous three times a day before meals  insulin detemir injectable (LEVEMIR) 75 Unit(s) SubCutaneous daily  insulin detemir injectable (LEVEMIR) 90 Unit(s) SubCutaneous daily  isosorbide   dinitrate Tablet (ISORDIL) 30 milliGRAM(s) Oral three times a day  lactated ringers. 1000 milliLiter(s) (75 mL/Hr) IV Continuous <Continuous>  levothyroxine 50 MICROGram(s) Oral daily  lidocaine   Patch 1 Patch Transdermal daily  melatonin 3 milliGRAM(s) Oral at bedtime  metoprolol succinate ER 25 milliGRAM(s) Oral daily  montelukast 10 milliGRAM(s) Oral daily  Nephro-enrico 1 Tablet(s) Oral daily  pantoprazole    Tablet 40 milliGRAM(s) Oral before breakfast  polyethylene glycol 3350 17 Gram(s) Oral daily  senna 2 Tablet(s) Oral at bedtime  sodium bicarbonate 650 milliGRAM(s) Oral two times a day  spironolactone 25 milliGRAM(s) Oral daily      Physical Exam  General: Patient comfortable in bed  Vital Signs Last 12 Hrs  T(F): 97.8 (09-03-19 @ 11:58), Max: 98.3 (09-03-19 @ 04:39)  HR: 82 (09-03-19 @ 11:58) (79 - 82)  BP: 110/66 (09-03-19 @ 11:58) (98/58 - 111/60)  BP(mean): --  RR: 18 (09-03-19 @ 11:58) (17 - 18)  SpO2: 100% (09-03-19 @ 11:58) (100% - 100%)  Neck: No palpable thyroid nodules.  CVS: S1S2, No murmurs  Respiratory: No wheezing, no crepitations  GI: Abdomen soft, bowel sounds positive  Musculoskeletal:  edema lower extremities.   Skin: No skin rashes, no ecchymosis    Diagnostics

## 2019-09-03 NOTE — PROGRESS NOTE ADULT - SUBJECTIVE AND OBJECTIVE BOX
[facility] [place]   MD GONZALEZ SHAH D.O, PA    TELEPHONE NUMBERS:  OFFICE: [phone]  DR. SANTOYO CELL: [phone]  JUSTEN FIELD CELL: [phone]  DR. RIDER CELL:  [phone]    RENAL FOLLOW UP NOTE  --------------------------------------------------------------------------------  HPI:Pt seen and examined at bedside.   Keira SOB, chest pain     PAST HISTORY  --------------------------------------------------------------------------------  No significant changes to PMH, PSH, FHx, SHx, unless otherwise noted    ALLERGIES & MEDICATIONS  --------------------------------------------------------------------------------  Allergies    azithromycin (Hives; Pruritus)    Intolerances      Standing Inpatient Medications  acetaminophen  IVPB .. 1000 milliGRAM(s) IV Intermittent once  aspirin enteric coated 81 milliGRAM(s) Oral daily  atorvastatin 40 milliGRAM(s) Oral at bedtime  buMETAnide 3 milliGRAM(s) Oral every 12 hours  chlorhexidine 2% Cloths 1 Application(s) Topical daily  clopidogrel Tablet 75 milliGRAM(s) Oral daily  colchicine 0.6 milliGRAM(s) Oral daily  dextrose 5%. 1000 milliLiter(s) IV Continuous <Continuous>  dextrose 50% Injectable 12.5 Gram(s) IV Push once  dextrose 50% Injectable 25 Gram(s) IV Push once  dextrose 50% Injectable 25 Gram(s) IV Push once  docusate sodium 100 milliGRAM(s) Oral two times a day  heparin  Injectable 5000 Unit(s) SubCutaneous every 12 hours  hydrALAZINE 50 milliGRAM(s) Oral every 8 hours  insulin aspart (NovoLOG) corrective regimen sliding scale.   SubCutaneous three times a day before meals  insulin aspart (NovoLOG) corrective regimen sliding scale.   SubCutaneous at bedtime  insulin aspart Injectable (NovoLOG) 56 Unit(s) SubCutaneous three times a day before meals  insulin detemir injectable (LEVEMIR) 75 Unit(s) SubCutaneous daily  insulin detemir injectable (LEVEMIR) 84 Unit(s) SubCutaneous at bedtime  isosorbide   dinitrate Tablet (ISORDIL) 30 milliGRAM(s) Oral three times a day  lactated ringers. 1000 milliLiter(s) IV Continuous <Continuous>  levothyroxine 50 MICROGram(s) Oral daily  lidocaine   Patch 1 Patch Transdermal daily  melatonin 3 milliGRAM(s) Oral at bedtime  metoprolol succinate ER 25 milliGRAM(s) Oral daily  montelukast 10 milliGRAM(s) Oral daily  Nephro-enrico 1 Tablet(s) Oral daily  pantoprazole    Tablet 40 milliGRAM(s) Oral before breakfast  polyethylene glycol 3350 17 Gram(s) Oral daily  senna 2 Tablet(s) Oral at bedtime  sodium bicarbonate 650 milliGRAM(s) Oral two times a day  spironolactone 25 milliGRAM(s) Oral daily    PRN Inpatient Medications  acetaminophen   Tablet .. 650 milliGRAM(s) Oral every 6 hours PRN  ALBUTerol/ipratropium for Nebulization 3 milliLiter(s) Nebulizer every 6 hours PRN  ALPRAZolam 0.25 milliGRAM(s) Oral three times a day PRN  dextrose 40% Gel 15 Gram(s) Oral once PRN  glucagon  Injectable 1 milliGRAM(s) IntraMuscular once PRN      REVIEW OF SYSTEMS  --------------------------------------------------------------------------------  General: no fever  CVS: no chest pain  RESP: no sob, no cough  ABD: no abdominal pain  : no dysuria,  MSK: no edema     VITALS/PHYSICAL EXAM  --------------------------------------------------------------------------------  T(C): 36.8 (09-03-19 @ 04:39), Max: 37 (09-02-19 @ 11:42)  HR: 82 (09-03-19 @ 08:11) (76 - 94)  BP: 111/60 (09-03-19 @ 08:11) (93/56 - 118/69)  RR: 17 (09-03-19 @ 04:39) (17 - 19)  SpO2: 100% (09-03-19 @ 04:39) (91% - 100%)  Wt(kg): --        09-02-19 @ 07:01  -  09-03-19 @ 07:00  --------------------------------------------------------  IN: 820 mL / OUT: 1000 mL / NET: -180 mL      Physical Exam:  	Gen: NAD  	HEENT: MMM  	Pulm: CTA B/L  	CV: S1S2  	Abd: Soft, +BS  	Ext: No LE edema B/L                      Neuro: Awake   	Skin: Warm and Dry     LABS/STUDIES  --------------------------------------------------------------------------------              9.1    6.7   >-----------<  342      [09-03-19 @ 06:40]              26.1     135  |  99  |  105  ----------------------------<  259      [09-03-19 @ 06:40]  3.9   |  18  |  2.15        Ca     9.8     [09-03-19 @ 06:40]    Creatinine Trend:  SCr 2.15 [09-03 @ 06:40]  SCr 2.05 [09-02 @ 06:49]  SCr 2.14 [09-01 @ 06:24]  SCr 2.06 [08-31 @ 08:14]  SCr 2.01 [08-30 @ 06:45]    Urinalysis - [08-22-19 @ 16:43]      Color Light Yellow / Appearance Clear / SG 1.020 / pH 6.0      Gluc >=1000 mg/dL / Ketone Negative  / Bili Negative / Urobili <2 mg/dL       Blood Negative / Protein Negative / Leuk Est Negative / Nitrite Negative      RBC  / WBC  / Hyaline  / Gran  / Sq Epi  / Non Sq Epi  / Bacteria       Iron 42, TIBC 348, %sat 12      [07-05-19 @ 22:32]  Ferritin 130      [07-05-19 @ 22:32]  .4 (Ca --)      [04-25-19 @ 05:45]   --  PTH 43.55 (Ca --)      [09-10-18 @ 07:15]   --  PTH 36.60 (Ca --)      [09-08-18 @ 03:15]   --  Vitamin D (25OH) 25.9      [05-01-19 @ 04:00]  HbA1c 7.4      [07-05-19 @ 22:32]  TSH 1.02      [08-26-19 @ 08:22]  Lipid: chol 148, , HDL 35,       [06-01-19 @ 08:00]

## 2019-09-03 NOTE — PROGRESS NOTE ADULT - ASSESSMENT
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 She is eating partial meals, weak, on large dose basal bolus insulin.   CAD: S/P cabg On medications, stable, monitored.  Gout: ankle pain has much improved s/p colchiceine regimen as per rheum recommendations  Hypothyroidism: synthroid 50 mcg po daily, asymptomatic.  HTN: Controlled, On med.  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 She is eating partial meals, weak, on large dose basal bolus insulin.   CAD: S/P cabg On medications, stable, monitored.  Gout: ankle pain has much improved s/p colchiceine regimen as per rheum recommendations  Hypothyroidism: synthroid 50 mcg po daily, asymptomatic.  HTN: Controlled, On med.  HLD; on statin  CKD: Monitor labs/BMP

## 2019-09-03 NOTE — PROGRESS NOTE ADULT - ATTENDING COMMENTS
Will  increase  Levemir 90u PM, continue 75u AM and increase Novolog to 60u before each meals  If sugars remain < 300 for 24 hrs, may get DC home on current insulin, FU with Endo in 2 weeks. Discussed with patient and primary team.

## 2019-09-03 NOTE — CHART NOTE - NSCHARTNOTEFT_GEN_A_CORE
Nutrition Follow Up Note  Patient seen for malnutrition follow-up. Per chart, 74 y/o female presented to ED [facility] +acute on chronic HF, MERVIN on CKD, renal function remains stable, nephrology following. End stage cardiomyopathy, no further interventions per chart. Palliative following. Pt remains hyperglycemic, per endocrinology note "Resistance due poor absorption of insulin ( Lipodisthrophy )," regimen continues to be adjusted and pt cannot be discharged.    PMHx: HTN, T2DM (last HgbA1c 7.4%), CAD s/p CABG,  non-dilated ICM (EF 20-25%), severe mitral regurgitation s/p mitral clip, severe pulm HTN, CKD IV, hypothyroidism    Source: Pt, EMR, RN    Diet : Consistent Carbohydrate, Halal, Fluid Restriction 1500mL, low sodium    Patient reports good appetite and PO intake, RD visually observed pt eating lunch and assisted pt with tray. Pt is able to communicate/answer questions in English, however is limited. Pt denied offer for . Pt states she enjoys yogurt and sugar free health shakes and endorsed eating cereal for breakfast.  Denies nausea/vomiting and GI distress, last BM documented on . Pt on bowel regimen. No family present at bedside, pt unable to participate in extensive verbal nutrition education in English at this time and denies offer for  phone. RD provided pt with written educational materials at bedside regarding Heart healthy/T2DM nutrition therapy. RD available to provide diet education when pt accepts  phone, or if family is present.    PO intake : Per flow sheets, pt with 50-75% intake     Source for PO intake: pt, flow sheets    Daily Weight in k.2 ()  51.7 ()  49 ()  51.6 ()  50.9 ()  51.3 ()  dosin.6kg  admitting wt: 55.4kg. Noted pt has since been diuresed, recent wts have remained stable.  % Weight Change    Pertinent Medications: MEDICATIONS  (STANDING):  acetaminophen  IVPB .. 1000 milliGRAM(s) IV Intermittent once  aspirin enteric coated 81 milliGRAM(s) Oral daily  atorvastatin 40 milliGRAM(s) Oral at bedtime  buMETAnide 3 milliGRAM(s) Oral every 12 hours  chlorhexidine 2% Cloths 1 Application(s) Topical daily  clopidogrel Tablet 75 milliGRAM(s) Oral daily  colchicine 0.6 milliGRAM(s) Oral daily  dextrose 5%. 1000 milliLiter(s) (50 mL/Hr) IV Continuous <Continuous>  dextrose 50% Injectable 12.5 Gram(s) IV Push once  dextrose 50% Injectable 25 Gram(s) IV Push once  dextrose 50% Injectable 25 Gram(s) IV Push once  docusate sodium 100 milliGRAM(s) Oral two times a day  heparin  Injectable 5000 Unit(s) SubCutaneous every 12 hours  hydrALAZINE 50 milliGRAM(s) Oral every 8 hours  insulin aspart (NovoLOG) corrective regimen sliding scale.   SubCutaneous three times a day before meals  insulin aspart (NovoLOG) corrective regimen sliding scale.   SubCutaneous at bedtime  insulin aspart Injectable (NovoLOG) 56 Unit(s) SubCutaneous three times a day before meals  insulin detemir injectable (LEVEMIR) 75 Unit(s) SubCutaneous daily  insulin detemir injectable (LEVEMIR) 84 Unit(s) SubCutaneous at bedtime  isosorbide   dinitrate Tablet (ISORDIL) 30 milliGRAM(s) Oral three times a day  lactated ringers. 1000 milliLiter(s) (75 mL/Hr) IV Continuous <Continuous>  levothyroxine 50 MICROGram(s) Oral daily  lidocaine   Patch 1 Patch Transdermal daily  melatonin 3 milliGRAM(s) Oral at bedtime  metoprolol succinate ER 25 milliGRAM(s) Oral daily  montelukast 10 milliGRAM(s) Oral daily  Nephro-enrico 1 Tablet(s) Oral daily  pantoprazole    Tablet 40 milliGRAM(s) Oral before breakfast  polyethylene glycol 3350 17 Gram(s) Oral daily  senna 2 Tablet(s) Oral at bedtime  sodium bicarbonate 650 milliGRAM(s) Oral two times a day  spironolactone 25 milliGRAM(s) Oral daily    MEDICATIONS  (PRN):  acetaminophen   Tablet .. 650 milliGRAM(s) Oral every 6 hours PRN Mild Pain (1 - 3), Moderate Pain (4 - 6)  ALBUTerol/ipratropium for Nebulization 3 milliLiter(s) Nebulizer every 6 hours PRN Shortness of Breath  ALPRAZolam 0.25 milliGRAM(s) Oral three times a day PRN Anxiety  dextrose 40% Gel 15 Gram(s) Oral once PRN Blood Glucose LESS THAN 70 milliGRAM(s)/deciliter  glucagon  Injectable 1 milliGRAM(s) IntraMuscular once PRN Glucose LESS THAN 70 milligrams/deciliter    Pertinent Labs:  @ 06:40: Na 135, <H>, Cr 2.15<H>, <H>, K+ 3.9, Phos --, Mg --, Alk Phos --, ALT/SGPT --, AST/SGOT --, HbA1c --    Finger Sticks:  POCT Blood Glucose.: 393 mg/dL ( @ 11:46)  POCT Blood Glucose.: 268 mg/dL ( @ 07:41)  POCT Blood Glucose.: 248 mg/dL ( @ 22:27)  POCT Blood Glucose.: 300 mg/dL ( @ 21:05)  POCT Blood Glucose.: 263 mg/dL ( @ 16:28)      Skin per nursing documentation: IAD  Edema: none    Estimated Needs:   [x] no change since previous assessment  [ ] recalculated:     Previous Nutrition Diagnosis: Predicted suboptimal energy intake -> Malnutrition, moderate (acute on chronic)  Nutrition Diagnosis is: ongoing, being addressed with oral nutrition supplement, micronutrient supplementation    New Nutrition Diagnosis: none  Related to:   As evidenced by:     Recommend  1) Continue current diet: Consistent Carbohydrate, low Na, halal, fluids deferred to team. Monitor Na levels, recommend liberalize diet if Na is below normal limits.  2) Continue Nephro-enrico daily and health shake x1 daily.  3) Pt aware RD remains available for any concerns, questions or diet preferences     Monitoring and Evaluation:   Continue to monitor Nutritional intake, Tolerance to diet prescription, weights, labs, skin integrity    Monica Angulo RD  Pager [phone]  RD remains available upon request and will follow up per protocol

## 2019-09-04 LAB
ANION GAP SERPL CALC-SCNC: 21 MMOL/L — HIGH (ref 5–17)
BUN SERPL-MCNC: 100 MG/DL — HIGH (ref 7–23)
CALCIUM SERPL-MCNC: 9.9 MG/DL — SIGNIFICANT CHANGE UP (ref 8.4–10.5)
CHLORIDE SERPL-SCNC: 99 MMOL/L — SIGNIFICANT CHANGE UP (ref 96–108)
CO2 SERPL-SCNC: 18 MMOL/L — LOW (ref 22–31)
CREAT SERPL-MCNC: 2.16 MG/DL — HIGH (ref 0.5–1.3)
GLUCOSE BLDC GLUCOMTR-MCNC: 219 MG/DL — HIGH (ref 70–99)
GLUCOSE BLDC GLUCOMTR-MCNC: 232 MG/DL — HIGH (ref 70–99)
GLUCOSE BLDC GLUCOMTR-MCNC: 240 MG/DL — HIGH (ref 70–99)
GLUCOSE BLDC GLUCOMTR-MCNC: 241 MG/DL — HIGH (ref 70–99)
GLUCOSE BLDC GLUCOMTR-MCNC: 269 MG/DL — HIGH (ref 70–99)
GLUCOSE BLDC GLUCOMTR-MCNC: 274 MG/DL — HIGH (ref 70–99)
GLUCOSE SERPL-MCNC: 207 MG/DL — HIGH (ref 70–99)
HCT VFR BLD CALC: 28 % — LOW (ref 34.5–45)
HGB BLD-MCNC: 9.7 G/DL — LOW (ref 11.5–15.5)
MCHC RBC-ENTMCNC: 29.8 PG — SIGNIFICANT CHANGE UP (ref 27–34)
MCHC RBC-ENTMCNC: 34.6 GM/DL — SIGNIFICANT CHANGE UP (ref 32–36)
MCV RBC AUTO: 86.3 FL — SIGNIFICANT CHANGE UP (ref 80–100)
PLATELET # BLD AUTO: 336 K/UL — SIGNIFICANT CHANGE UP (ref 150–400)
POTASSIUM SERPL-MCNC: 3.7 MMOL/L — SIGNIFICANT CHANGE UP (ref 3.5–5.3)
POTASSIUM SERPL-SCNC: 3.7 MMOL/L — SIGNIFICANT CHANGE UP (ref 3.5–5.3)
RBC # BLD: 3.24 M/UL — LOW (ref 3.8–5.2)
RBC # FLD: 16.2 % — HIGH (ref 10.3–14.5)
SODIUM SERPL-SCNC: 138 MMOL/L — SIGNIFICANT CHANGE UP (ref 135–145)
WBC # BLD: 7 K/UL — SIGNIFICANT CHANGE UP (ref 3.8–10.5)
WBC # FLD AUTO: 7 K/UL — SIGNIFICANT CHANGE UP (ref 3.8–10.5)

## 2019-09-04 RX ORDER — BUMETANIDE 0.25 MG/ML
3 INJECTION INTRAMUSCULAR; INTRAVENOUS
Qty: 0 | Refills: 0 | DISCHARGE
Start: 2019-09-04

## 2019-09-04 RX ORDER — ALPRAZOLAM 0.25 MG
1 TABLET ORAL
Qty: 0 | Refills: 0 | DISCHARGE
Start: 2019-09-04

## 2019-09-04 RX ORDER — ACETAMINOPHEN 500 MG
2 TABLET ORAL
Qty: 0 | Refills: 0 | DISCHARGE
Start: 2019-09-04

## 2019-09-04 RX ORDER — LANOLIN ALCOHOL/MO/W.PET/CERES
1 CREAM (GRAM) TOPICAL
Qty: 0 | Refills: 0 | DISCHARGE
Start: 2019-09-04

## 2019-09-04 RX ORDER — SPIRONOLACTONE 25 MG/1
1 TABLET, FILM COATED ORAL
Qty: 0 | Refills: 0 | DISCHARGE
Start: 2019-09-04

## 2019-09-04 RX ADMIN — POLYETHYLENE GLYCOL 3350 17 GRAM(S): 17 POWDER, FOR SOLUTION ORAL at 12:03

## 2019-09-04 RX ADMIN — LIDOCAINE 1 PATCH: 4 CREAM TOPICAL at 22:32

## 2019-09-04 RX ADMIN — BUMETANIDE 3 MILLIGRAM(S): 0.25 INJECTION INTRAMUSCULAR; INTRAVENOUS at 18:26

## 2019-09-04 RX ADMIN — Medication 650 MILLIGRAM(S): at 05:21

## 2019-09-04 RX ADMIN — ISOSORBIDE DINITRATE 30 MILLIGRAM(S): 5 TABLET ORAL at 22:32

## 2019-09-04 RX ADMIN — INSULIN ASPART 60 UNIT(S): 100 INJECTION, SOLUTION SUBCUTANEOUS at 12:00

## 2019-09-04 RX ADMIN — CLOPIDOGREL BISULFATE 75 MILLIGRAM(S): 75 TABLET, FILM COATED ORAL at 12:02

## 2019-09-04 RX ADMIN — LIDOCAINE 1 PATCH: 4 CREAM TOPICAL at 10:00

## 2019-09-04 RX ADMIN — Medication 50 MILLIGRAM(S): at 22:31

## 2019-09-04 RX ADMIN — Medication 50 MILLIGRAM(S): at 05:20

## 2019-09-04 RX ADMIN — INSULIN ASPART 6: 100 INJECTION, SOLUTION SUBCUTANEOUS at 12:01

## 2019-09-04 RX ADMIN — PANTOPRAZOLE SODIUM 40 MILLIGRAM(S): 20 TABLET, DELAYED RELEASE ORAL at 05:21

## 2019-09-04 RX ADMIN — Medication 1 TABLET(S): at 13:30

## 2019-09-04 RX ADMIN — ATORVASTATIN CALCIUM 40 MILLIGRAM(S): 80 TABLET, FILM COATED ORAL at 22:31

## 2019-09-04 RX ADMIN — INSULIN ASPART 4: 100 INJECTION, SOLUTION SUBCUTANEOUS at 08:25

## 2019-09-04 RX ADMIN — Medication 81 MILLIGRAM(S): at 12:01

## 2019-09-04 RX ADMIN — MONTELUKAST 10 MILLIGRAM(S): 4 TABLET, CHEWABLE ORAL at 12:02

## 2019-09-04 RX ADMIN — CHLORHEXIDINE GLUCONATE 1 APPLICATION(S): 213 SOLUTION TOPICAL at 08:24

## 2019-09-04 RX ADMIN — Medication 3 MILLIGRAM(S): at 22:32

## 2019-09-04 RX ADMIN — Medication 0.6 MILLIGRAM(S): at 12:03

## 2019-09-04 RX ADMIN — LIDOCAINE 1 PATCH: 4 CREAM TOPICAL at 07:12

## 2019-09-04 RX ADMIN — CHLORHEXIDINE GLUCONATE 1 APPLICATION(S): 213 SOLUTION TOPICAL at 22:00

## 2019-09-04 RX ADMIN — HEPARIN SODIUM 5000 UNIT(S): 5000 INJECTION INTRAVENOUS; SUBCUTANEOUS at 18:27

## 2019-09-04 RX ADMIN — BUMETANIDE 3 MILLIGRAM(S): 0.25 INJECTION INTRAMUSCULAR; INTRAVENOUS at 05:21

## 2019-09-04 RX ADMIN — Medication 90 UNIT(S): at 23:33

## 2019-09-04 RX ADMIN — Medication 100 MILLIGRAM(S): at 18:26

## 2019-09-04 RX ADMIN — SPIRONOLACTONE 25 MILLIGRAM(S): 25 TABLET, FILM COATED ORAL at 09:47

## 2019-09-04 RX ADMIN — INSULIN ASPART 4: 100 INJECTION, SOLUTION SUBCUTANEOUS at 18:26

## 2019-09-04 RX ADMIN — HEPARIN SODIUM 5000 UNIT(S): 5000 INJECTION INTRAVENOUS; SUBCUTANEOUS at 05:21

## 2019-09-04 RX ADMIN — Medication 650 MILLIGRAM(S): at 18:27

## 2019-09-04 RX ADMIN — Medication 25 MILLIGRAM(S): at 05:21

## 2019-09-04 RX ADMIN — Medication 75 UNIT(S): at 09:47

## 2019-09-04 RX ADMIN — INSULIN ASPART 60 UNIT(S): 100 INJECTION, SOLUTION SUBCUTANEOUS at 18:26

## 2019-09-04 RX ADMIN — INSULIN ASPART 60 UNIT(S): 100 INJECTION, SOLUTION SUBCUTANEOUS at 08:25

## 2019-09-04 RX ADMIN — ISOSORBIDE DINITRATE 30 MILLIGRAM(S): 5 TABLET ORAL at 05:21

## 2019-09-04 RX ADMIN — Medication 50 MICROGRAM(S): at 05:21

## 2019-09-04 RX ADMIN — ISOSORBIDE DINITRATE 30 MILLIGRAM(S): 5 TABLET ORAL at 14:09

## 2019-09-04 RX ADMIN — Medication 50 MILLIGRAM(S): at 13:33

## 2019-09-04 NOTE — PROGRESS NOTE ADULT - SUBJECTIVE AND OBJECTIVE BOX
CHIEF COMPLAINT:    SUBJECTIVE:     REVIEW OF SYSTEMS:    CONSTITUTIONAL: (  )  weakness,  (  ) fevers or chills  EYES/ENT: (  )visual changes;     NECK: (  ) pain or stiffness  RESPIRATORY:   (  )cough, wheezing, hemoptysis;  (  ) shortness of breath  CARDIOVASCULAR:  (  )chest pain or palpitations  GASTROINTESTINAL:   (  )abdominal or epigastric pain.  (  ) nausea, vomiting, or hematemesis;   (   ) diarrhea or constipation.   GENITOURINARY:   (    ) dysuria, frequency or hematuria  NEUROLOGICAL:  (   ) numbness or weakness   All other review of systems is negative unless indicated above    Vital Signs Last 24 Hrs  T(C): 36.9 (04 Sep 2019 03:53), Max: 36.9 (04 Sep 2019 03:53)  T(F): 98.5 (04 Sep 2019 03:53), Max: 98.5 (04 Sep 2019 03:53)  HR: 91 (04 Sep 2019 03:53) (82 - 91)  BP: 107/61 (04 Sep 2019 03:53) (104/61 - 110/66)  BP(mean): --  RR: 18 (04 Sep 2019 03:53) (18 - 19)  SpO2: 99% (03 Sep 2019 20:04) (99% - 100%)    I&O's Summary    03 Sep 2019 07:01  -  04 Sep 2019 07:00  --------------------------------------------------------  IN: 700 mL / OUT: 2000 mL / NET: -1300 mL        CAPILLARY BLOOD GLUCOSE  215 (03 Sep 2019 22:34)      POCT Blood Glucose.: 240 mg/dL (04 Sep 2019 07:32)  POCT Blood Glucose.: 215 mg/dL (03 Sep 2019 22:34)  POCT Blood Glucose.: 223 mg/dL (03 Sep 2019 17:53)  POCT Blood Glucose.: 254 mg/dL (03 Sep 2019 16:41)  POCT Blood Glucose.: 393 mg/dL (03 Sep 2019 11:46)      PHYSICAL EXAM:    Constitutional:  (   ) NAD,   (   )awake and alert  HEENT: PERR, EOMI,    Neck: Soft and supple, No LAD, No JVD  Respiratory:  (    Breath sounds are clear bilaterally,    (   ) wheezing, rales or rhonchi  Cardiovascular:     (   )S1 and S2, regular rate and rhythm, no Murmurs, gallops or rubs  Gastrointestinal:  (   )Bowel Sounds present, soft,   (  )nontender, nondistended,    Extremities:    (  ) peripheral edema  Vascular: 2+ peripheral pulses  Neurological:    (    )A/O x 3,   (  ) focal deficits  Musculoskeletal:    (   )  normal strength b/l upper  (     ) normal  lower extremities  Skin: No rashes    MEDICATIONS:  MEDICATIONS  (STANDING):  acetaminophen  IVPB .. 1000 milliGRAM(s) IV Intermittent once  aspirin enteric coated 81 milliGRAM(s) Oral daily  atorvastatin 40 milliGRAM(s) Oral at bedtime  buMETAnide 3 milliGRAM(s) Oral every 12 hours  chlorhexidine 2% Cloths 1 Application(s) Topical daily  clopidogrel Tablet 75 milliGRAM(s) Oral daily  colchicine 0.6 milliGRAM(s) Oral daily  dextrose 5%. 1000 milliLiter(s) (50 mL/Hr) IV Continuous <Continuous>  dextrose 50% Injectable 12.5 Gram(s) IV Push once  dextrose 50% Injectable 25 Gram(s) IV Push once  dextrose 50% Injectable 25 Gram(s) IV Push once  docusate sodium 100 milliGRAM(s) Oral two times a day  heparin  Injectable 5000 Unit(s) SubCutaneous every 12 hours  hydrALAZINE 50 milliGRAM(s) Oral every 8 hours  insulin aspart (NovoLOG) corrective regimen sliding scale.   SubCutaneous three times a day before meals  insulin aspart (NovoLOG) corrective regimen sliding scale.   SubCutaneous at bedtime  insulin aspart Injectable (NovoLOG) 60 Unit(s) SubCutaneous three times a day before meals  insulin detemir injectable (LEVEMIR) 75 Unit(s) SubCutaneous daily  insulin detemir injectable (LEVEMIR) 90 Unit(s) SubCutaneous at bedtime  isosorbide   dinitrate Tablet (ISORDIL) 30 milliGRAM(s) Oral three times a day  lactated ringers. 1000 milliLiter(s) (75 mL/Hr) IV Continuous <Continuous>  levothyroxine 50 MICROGram(s) Oral daily  lidocaine   Patch 1 Patch Transdermal daily  melatonin 3 milliGRAM(s) Oral at bedtime  metoprolol succinate ER 25 milliGRAM(s) Oral daily  montelukast 10 milliGRAM(s) Oral daily  Nephro-enrico 1 Tablet(s) Oral daily  pantoprazole    Tablet 40 milliGRAM(s) Oral before breakfast  polyethylene glycol 3350 17 Gram(s) Oral daily  senna 2 Tablet(s) Oral at bedtime  sodium bicarbonate 650 milliGRAM(s) Oral two times a day  spironolactone 25 milliGRAM(s) Oral daily      LABS: All Labs Reviewed:                        9.7    7.0   )-----------( 336      ( 04 Sep 2019 06:31 )             28.0     09-04    138  |  99  |  100<H>  ----------------------------<  207<H>  3.7   |  18<L>  |  2.16<H>    Ca    9.9      04 Sep 2019 06:31            Blood Culture:   Urine Culture      RADIOLOGY/EKG:    ASSESSMENT AND PLAN:    DVT PPX:    ADVANCED DIRECTIVE:    DISPOSITION: CHIEF COMPLAINT:  patient awake and verbal eating her breakfast without complaint she is asking when she can go home?     SUBJECTIVE:     REVIEW OF SYSTEMS:    CONSTITUTIONAL: (x  )  weakness,  (  ) fevers or chills  EYES/ENT: (  )visual changes;     NECK: (  ) pain or stiffness  RESPIRATORY:   (  )cough, wheezing, hemoptysis;  (  ) shortness of breath  CARDIOVASCULAR:  (  )chest pain or palpitations  GASTROINTESTINAL:   (  )abdominal or epigastric pain.  (  ) nausea, vomiting, or hematemesis;   (   ) diarrhea or constipation.   GENITOURINARY:   (    ) dysuria, frequency or hematuria  NEUROLOGICAL:  (   ) numbness or weakness   All other review of systems is negative unless indicated above    Vital Signs Last 24 Hrs  T(C): 36.9 (04 Sep 2019 03:53), Max: 36.9 (04 Sep 2019 03:53)  T(F): 98.5 (04 Sep 2019 03:53), Max: 98.5 (04 Sep 2019 03:53)  HR: 91 (04 Sep 2019 03:53) (82 - 91)  BP: 107/61 (04 Sep 2019 03:53) (104/61 - 110/66)  BP(mean): --  RR: 18 (04 Sep 2019 03:53) (18 - 19)  SpO2: 99% (03 Sep 2019 20:04) (99% - 100%)    I&O's Summary    03 Sep 2019 07:01  -  04 Sep 2019 07:00  --------------------------------------------------------  IN: 700 mL / OUT: 2000 mL / NET: -1300 mL        CAPILLARY BLOOD GLUCOSE  215 (03 Sep 2019 22:34)      POCT Blood Glucose.: 240 mg/dL (04 Sep 2019 07:32)  POCT Blood Glucose.: 215 mg/dL (03 Sep 2019 22:34)  POCT Blood Glucose.: 223 mg/dL (03 Sep 2019 17:53)  POCT Blood Glucose.: 254 mg/dL (03 Sep 2019 16:41)  POCT Blood Glucose.: 393 mg/dL (03 Sep 2019 11:46)      PHYSICAL EXAM:  Neck: No palpable thyroid nodules.  CVS: S1S2, No murmurs  Respiratory: No wheezing, no crepitations  GI: Abdomen soft, bowel sounds positive  Musculoskeletal:  edema lower extremities.   Skin: No skin rashes, no ecchymosiscalled them now and basically chart review; now please       MEDICATIONS:  MEDICATIONS  (STANDING):Y  acetaminophen  IVPB .. 1000 milliGRAM(s) IV Intermittent once  aspirin enteric coated 81 milliGRAM(s) Oral daily  atorvastatin 40 milliGRAM(s) Oral at bedtime  buMETAnide 3 milliGRAM(s) Oral every 12 hours  chlorhexidine 2% Cloths 1 Application(s) Topical daily  clopidogrel Tablet 75 milliGRAM(s) Oral daily  colchicine 0.6 milliGRAM(s) Oral daily  dextrose 5%. 1000 milliLiter(s) (50 mL/Hr) IV Continuous <Continuous>  dextrose 50% Injectable 12.5 Gram(s)   dextrose 50% Injectable 25 Gram(s) IV Push once  dextrose 50% Injectable 25 Gram(s) IV Push once  docusate sodium 100 milliGRAM(s) Oral two times a day  heparin  Injectable 5000 Unit(s) SubCutaneous every 12 hours  hydrALAZINE 50 milliGRAM(s) Oral every 8 hours  insulin aspart (NovoLOG) corrective regimen sliding scale.   SubCutaneous three times a day before meals  insulin aspart (NovoLOG) corrective regimen sliding scale.   SubCutaneous at bedtime  insulin aspart Injectable (NovoLOG) 60 Unit(s) SubCutaneous three times a day before meals  insulin detemir injectable (LEVEMIR) 75 Unit(s) SubCutaneous daily  insulin detemir injectable (LEVEMIR) 90 Unit(s) SubCutaneous at bedtime  isosorbide   dinitrate Tablet (ISORDIL) 30 milliGRAM(s) Oral three times a day  lactated ringers. 1000 milliLiter(s) (75 mL/Hr) IV Continuous <Continuous>  levothyroxine 50 MICROGram(s) Oral daily  lidocaine   Patch 1 Patch Transdermal daily  melatonin 3 milliGRAM(s) Oral at bedtime  metoprolol succinate ER 25 milliGRAM(s) Oral daily  montelukast 10 milliGRAM(s) Oral daily  Nephro-enrico 1 Tablet(s) Oral daily  pantoprazole    Tablet 40 milliGRAM(s) Oral before breakfast  polyethylene glycol 3350 17 Gram(s) Oral daily  Neck: No palpable thyroid nodules.  CVS: S1S2, No murmurs  Respiratory: No wheezing, no crepitations  GI: Abdomen soft, bowel sounds positive  Musculoskeletal:  edema lower extremities.   Skin: No skin rashes, no ecchymosissenna 2 Tablet(s) Oral at bedtime  sodium bicarbonate 650 milliGRAM(s) Oral two times a day  spironolactone 25 milliGRAM(s) Oral daily      LABS: All Labs Reviewed:                        9.7    7.0   )-----------( 336      ( 04 Sep 2019 06:31 )             28.0     09-04    138  |  99  |  100<H>  ----------------------------<  207<H>  3.7   |  18<L>  |  2.16<H>    Ca    9.9      04 Sep 2019 06:31            Blood Culture:   Urine Culture      RADIOLOGY/EKG:    ASSESSMENT AND PLAN:  patient condition remained stable glycemic control  was improving but  Prandin was discontinued and patient was started on Levemir unfortunately the pharmacy did not approve U5 100  This case was discussed in with medical team in detail     #diabetes continue             insulin aspart (NovoLOG) corrective regimen sliding scale.   SubCutaneous three times a day before meals  insulin aspart (NovoLOG) corrective regimen sliding scale.   SubCutaneous at bedtime  insulin aspart Injectable (NovoLOG) 60 Unit(s) SubCutaneous three times a day before meals  insulin detemir injectable (LEVEMIR) 75 Unit(s) SubCutaneous daily  insulin detemir injectable (LEVEMIR) 90 Unit(s) SubCutaneous at bedtime          #CHF CAD stable continue Bumex 3 mg twice a day                    Family wants to take her home over all she is high risk for readmission and intubation at home she is full code at present time  discussed with  endocrinologist : Adjust her insulin to higher dose but eventually she needs insulin pump      DVT PPX:    ADVANCED DIRECTIVE:    DISPOSITION:

## 2019-09-04 NOTE — PROGRESS NOTE ADULT - SUBJECTIVE AND OBJECTIVE BOX
Chief complaint  Patient is a 73y old  Female who presents with a chief complaint of Hypoxia, SOB (04 Sep 2019 11:29)   Review of systems  Patient in bed, looks comfortable, no fever, no hypoglycemia.    Labs and Fingersticks  CAPILLARY BLOOD GLUCOSE  215 (03 Sep 2019 22:34)      POCT Blood Glucose.: 269 mg/dL (04 Sep 2019 11:46)  POCT Blood Glucose.: 274 mg/dL (04 Sep 2019 09:39)  POCT Blood Glucose.: 240 mg/dL (04 Sep 2019 07:32)  POCT Blood Glucose.: 215 mg/dL (03 Sep 2019 22:34)  POCT Blood Glucose.: 223 mg/dL (03 Sep 2019 17:53)  POCT Blood Glucose.: 254 mg/dL (03 Sep 2019 16:41)      Anion Gap, Serum: 21 <H> (09-04 @ 06:31)  Anion Gap, Serum: 19 <H> (09-03 @ 16:50)  Anion Gap, Serum: 18 <H> (09-03 @ 06:40)      Calcium, Total Serum: 9.9 (09-04 @ 06:31)  Calcium, Total Serum: 9.7 (09-03 @ 16:50)  Calcium, Total Serum: 9.8 (09-03 @ 06:40)          09-04    138  |  99  |  100<H>  ----------------------------<  207<H>  3.7   |  18<L>  |  2.16<H>    Ca    9.9      04 Sep 2019 06:31                          9.7    7.0   )-----------( 336      ( 04 Sep 2019 06:31 )             28.0     Medications  MEDICATIONS  (STANDING):  acetaminophen  IVPB .. 1000 milliGRAM(s) IV Intermittent once  aspirin enteric coated 81 milliGRAM(s) Oral daily  atorvastatin 40 milliGRAM(s) Oral at bedtime  buMETAnide 3 milliGRAM(s) Oral every 12 hours  chlorhexidine 2% Cloths 1 Application(s) Topical daily  clopidogrel Tablet 75 milliGRAM(s) Oral daily  colchicine 0.6 milliGRAM(s) Oral daily  dextrose 5%. 1000 milliLiter(s) (50 mL/Hr) IV Continuous <Continuous>  dextrose 50% Injectable 12.5 Gram(s) IV Push once  dextrose 50% Injectable 25 Gram(s) IV Push once  dextrose 50% Injectable 25 Gram(s) IV Push once  docusate sodium 100 milliGRAM(s) Oral two times a day  heparin  Injectable 5000 Unit(s) SubCutaneous every 12 hours  hydrALAZINE 50 milliGRAM(s) Oral every 8 hours  insulin aspart (NovoLOG) corrective regimen sliding scale.   SubCutaneous three times a day before meals  insulin aspart (NovoLOG) corrective regimen sliding scale.   SubCutaneous at bedtime  insulin aspart Injectable (NovoLOG) 60 Unit(s) SubCutaneous three times a day before meals  insulin detemir injectable (LEVEMIR) 75 Unit(s) SubCutaneous daily  insulin detemir injectable (LEVEMIR) 90 Unit(s) SubCutaneous at bedtime  isosorbide   dinitrate Tablet (ISORDIL) 30 milliGRAM(s) Oral three times a day  lactated ringers. 1000 milliLiter(s) (75 mL/Hr) IV Continuous <Continuous>  levothyroxine 50 MICROGram(s) Oral daily  lidocaine   Patch 1 Patch Transdermal daily  melatonin 3 milliGRAM(s) Oral at bedtime  metoprolol succinate ER 25 milliGRAM(s) Oral daily  montelukast 10 milliGRAM(s) Oral daily  Nephro-enrico 1 Tablet(s) Oral daily  pantoprazole    Tablet 40 milliGRAM(s) Oral before breakfast  polyethylene glycol 3350 17 Gram(s) Oral daily  senna 2 Tablet(s) Oral at bedtime  sodium bicarbonate 650 milliGRAM(s) Oral two times a day  spironolactone 25 milliGRAM(s) Oral daily      Physical Exam  General: Patient comfortable in bed  Vital Signs Last 12 Hrs  T(F): 97.8 (09-04-19 @ 12:00), Max: 98.5 (09-04-19 @ 03:53)  HR: 86 (09-04-19 @ 12:00) (86 - 91)  BP: 104/63 (09-04-19 @ 12:00) (104/63 - 107/61)  BP(mean): --  RR: 18 (09-04-19 @ 12:00) (18 - 18)  SpO2: 99% (09-04-19 @ 12:00) (99% - 99%)  Neck: No palpable thyroid nodules.  CVS: S1S2, No murmurs  Respiratory: No wheezing, no crepitations  GI: Abdomen soft, bowel sounds positive  Musculoskeletal:  edema lower extremities.   Skin: No skin rashes, no ecchymosis    Diagnostics Chief complaint  Patient is a 73y old  Female who presents with a chief complaint of Hypoxia, SOB (04 Sep 2019 11:29)   Review of systems  Patient in bed, looks comfortable, no fever, no hypoglycemia.    Labs and Fingersticks  CAPILLARY BLOOD GLUCOSE  215 (03 Sep 2019 22:34)      POCT Blood Glucose.: 269 mg/dL  (04 Sep 2019 11:46)  POCT Blood Glucose.: 274 mg/dL (04 Sep 2019 09:39)  POCT Blood Glucose.: 240 mg/dL (04 Sep 2019 07:32)  POCT Blood Glucose.: 215 mg/dL (03 Sep 2019 22:34)  POCT Blood Glucose.: 223 mg/dL (03 Sep 2019 17:53)  POCT Blood Glucose.: 254 mg/dL (03 Sep 2019 16:41)      Anion Gap, Serum: 21 <H> (09-04 @ 06:31)  Anion Gap, Serum: 19 <H> (09-03 @ 16:50)  Anion Gap, Serum: 18 <H> (09-03 @ 06:40)      Calcium, Total Serum: 9.9 (09-04 @ 06:31)  Calcium, Total Serum: 9.7 (09-03 @ 16:50)  Calcium, Total Serum: 9.8 (09-03 @ 06:40)          09-04    138  |  99  |  100<H>  ----------------------------<  207<H>  3.7   |  18<L>  |  2.16<H>    Ca    9.9      04 Sep 2019 06:31                          9.7    7.0   )-----------( 336      ( 04 Sep 2019 06:31 )             28.0     Medications  MEDICATIONS  (STANDING):  acetaminophen  IVPB .. 1000 milliGRAM(s) IV Intermittent once  aspirin enteric coated 81 milliGRAM(s) Oral daily  atorvastatin 40 milliGRAM(s) Oral at bedtime  buMETAnide 3 milliGRAM(s) Oral every 12 hours  chlorhexidine 2% Cloths 1 Application(s) Topical daily  clopidogrel Tablet 75 milliGRAM(s) Oral daily  colchicine 0.6 milliGRAM(s) Oral daily  dextrose 5%. 1000 milliLiter(s) (50 mL/Hr) IV Continuous <Continuous>  dextrose 50% Injectable 12.5 Gram(s) IV Push once  dextrose 50% Injectable 25 Gram(s) IV Push once  dextrose 50% Injectable 25 Gram(s) IV Push once  docusate sodium 100 milliGRAM(s) Oral two times a day  heparin  Injectable 5000 Unit(s) SubCutaneous every 12 hours  hydrALAZINE 50 milliGRAM(s) Oral every 8 hours  insulin aspart (NovoLOG) corrective regimen sliding scale.   SubCutaneous three times a day before meals  insulin aspart (NovoLOG) corrective regimen sliding scale.   SubCutaneous at bedtime  insulin aspart Injectable (NovoLOG) 60 Unit(s) SubCutaneous three times a day before meals  insulin detemir injectable (LEVEMIR) 75 Unit(s) SubCutaneous daily  insulin detemir injectable (LEVEMIR) 90 Unit(s) SubCutaneous at bedtime  isosorbide   dinitrate Tablet (ISORDIL) 30 milliGRAM(s) Oral three times a day  lactated ringers. 1000 milliLiter(s) (75 mL/Hr) IV Continuous <Continuous>  levothyroxine 50 MICROGram(s) Oral daily  lidocaine   Patch 1 Patch Transdermal daily  melatonin 3 milliGRAM(s) Oral at bedtime  metoprolol succinate ER 25 milliGRAM(s) Oral daily  montelukast 10 milliGRAM(s) Oral daily  Nephro-enrico 1 Tablet(s) Oral daily  pantoprazole    Tablet 40 milliGRAM(s) Oral before breakfast  polyethylene glycol 3350 17 Gram(s) Oral daily  senna 2 Tablet(s) Oral at bedtime  sodium bicarbonate 650 milliGRAM(s) Oral two times a day  spironolactone 25 milliGRAM(s) Oral daily      Physical Exam  General: Patient comfortable in bed  Vital Signs Last 12 Hrs  T(F): 97.8 (09-04-19 @ 12:00), Max: 98.5 (09-04-19 @ 03:53)  HR: 86 (09-04-19 @ 12:00) (86 - 91)  BP: 104/63 (09-04-19 @ 12:00) (104/63 - 107/61)  BP(mean): --  RR: 18 (09-04-19 @ 12:00) (18 - 18)  SpO2: 99% (09-04-19 @ 12:00) (99% - 99%)  Neck: No palpable thyroid nodules.  CVS: S1S2, No murmurs  Respiratory: No wheezing, no crepitations  GI: Abdomen soft, bowel sounds positive  Musculoskeletal:  edema lower extremities.   Skin: No skin rashes, no ecchymosis    Diagnostics

## 2019-09-04 NOTE — PROGRESS NOTE ADULT - PROBLEM SELECTOR PLAN 1
Will continue current insulin regimen. Will continue monitoring FS, log, and follow up.   Stable for d/c to rehab as long as sugars <300 .  Suggest to continue current insulin management one d/c to rehab. She is a candidate for U500 or insulin pump, which can only be done as outpatient. FU outpatient endocrinologist within 2 weeks. Spoke with patient and primary team.

## 2019-09-04 NOTE — PROGRESS NOTE ADULT - ASSESSMENT
Pt is a 72 yo woman with PMHx of HTN, T2DM, CAD s/p CABG (LIMA to LAD, SVG to OM, SVG to PDA 2014 at [facility]), non-dilated ICM (EF 20-25%), severe mitral regurgitation s/p mitral clip (6/13/19 Dr. Newman), severe pulm HTN, CKD, hypothyroidism admitted with worsening SOB.    A/P:  MERVIN  Likely due to cardiogenic shock  Renal function remains stable   subject to fluctuations based on Renal Perfusion.  Pt currently on Bumex 3 mg PO Q12 and spironolactone 25mg daily  - Scr currently stable   - Continue diuretic therapy per cardiology.  - Optimize glucose control  - Monitor renal function   - Avoid further nephrotoxics, NSAIDS, RCA    Hyponatremia  in the setting of hyperglycemia.   Currently stable. Monitor   Optimize glucose control    CKD stage 4:  baseline Scr 1.8-2.0. Scr stable today.   Renal function fluctuates sec to CHF  Monitor renal function at present    SOB:  in setting of HF. Improved on diuretic therapy.   Continue diuretics per cardiology    Acidosis   Sec to Renal failure  on sodium bicarb 650mg BID  Monitor serum Co2     Hypokalemia:  in the setting of diuretic therapy.   Serum potassium stable      Anemia:   Hb stable  Pt with iron deficiency anemia.

## 2019-09-04 NOTE — PROGRESS NOTE ADULT - SUBJECTIVE AND OBJECTIVE BOX
[facility]   MD GONZALEZ SHAH D.O, PA    TELEPHONE NUMBERS:  OFFICE: [phone]  DR. SANTOYO CELL: [phone]  JUSTEN FIELD CELL: [phone]  DR. RIDER CELL:  [phone]    RENAL FOLLOW UP NOTE  --------------------------------------------------------------------------------  HPI: Pt seen and examined at bedside.   Denies SOB, chest pain or LE edema     PAST HISTORY  --------------------------------------------------------------------------------  No significant changes to PMH, PSH, FHx, SHx, unless otherwise noted    ALLERGIES & MEDICATIONS  --------------------------------------------------------------------------------  Allergies    azithromycin (Hives; Pruritus)    Intolerances      Standing Inpatient Medications  acetaminophen  IVPB .. 1000 milliGRAM(s) IV Intermittent once  aspirin enteric coated 81 milliGRAM(s) Oral daily  atorvastatin 40 milliGRAM(s) Oral at bedtime  buMETAnide 3 milliGRAM(s) Oral every 12 hours  chlorhexidine 2% Cloths 1 Application(s) Topical daily  clopidogrel Tablet 75 milliGRAM(s) Oral daily  colchicine 0.6 milliGRAM(s) Oral daily  dextrose 5%. 1000 milliLiter(s) IV Continuous <Continuous>  dextrose 50% Injectable 12.5 Gram(s) IV Push once  dextrose 50% Injectable 25 Gram(s) IV Push once  dextrose 50% Injectable 25 Gram(s) IV Push once  docusate sodium 100 milliGRAM(s) Oral two times a day  heparin  Injectable 5000 Unit(s) SubCutaneous every 12 hours  hydrALAZINE 50 milliGRAM(s) Oral every 8 hours  insulin aspart (NovoLOG) corrective regimen sliding scale.   SubCutaneous three times a day before meals  insulin aspart (NovoLOG) corrective regimen sliding scale.   SubCutaneous at bedtime  insulin aspart Injectable (NovoLOG) 60 Unit(s) SubCutaneous three times a day before meals  insulin detemir injectable (LEVEMIR) 75 Unit(s) SubCutaneous daily  insulin detemir injectable (LEVEMIR) 90 Unit(s) SubCutaneous at bedtime  isosorbide   dinitrate Tablet (ISORDIL) 30 milliGRAM(s) Oral three times a day  lactated ringers. 1000 milliLiter(s) IV Continuous <Continuous>  levothyroxine 50 MICROGram(s) Oral daily  lidocaine   Patch 1 Patch Transdermal daily  melatonin 3 milliGRAM(s) Oral at bedtime  metoprolol succinate ER 25 milliGRAM(s) Oral daily  montelukast 10 milliGRAM(s) Oral daily  Nephro-enrico 1 Tablet(s) Oral daily  pantoprazole    Tablet 40 milliGRAM(s) Oral before breakfast  polyethylene glycol 3350 17 Gram(s) Oral daily  senna 2 Tablet(s) Oral at bedtime  sodium bicarbonate 650 milliGRAM(s) Oral two times a day  spironolactone 25 milliGRAM(s) Oral daily    PRN Inpatient Medications  acetaminophen   Tablet .. 650 milliGRAM(s) Oral every 6 hours PRN  ALBUTerol/ipratropium for Nebulization 3 milliLiter(s) Nebulizer every 6 hours PRN  ALPRAZolam 0.25 milliGRAM(s) Oral three times a day PRN  dextrose 40% Gel 15 Gram(s) Oral once PRN  glucagon  Injectable 1 milliGRAM(s) IntraMuscular once PRN      REVIEW OF SYSTEMS  --------------------------------------------------------------------------------  General: no fever  CVS: no chest pain  RESP: no sob, no cough  ABD: no abdominal pain  : no dysuria,  MSK: no edema     VITALS/PHYSICAL EXAM  --------------------------------------------------------------------------------  T(C): 36.9 (09-04-19 @ 03:53), Max: 36.9 (09-04-19 @ 03:53)  HR: 91 (09-04-19 @ 03:53) (82 - 91)  BP: 107/61 (09-04-19 @ 03:53) (104/61 - 110/66)  RR: 18 (09-04-19 @ 03:53) (18 - 19)  SpO2: 99% (09-03-19 @ 20:04) (99% - 100%)  Wt(kg): --    09-03-19 @ 07:01  -  09-04-19 @ 07:00  --------------------------------------------------------  IN: 700 mL / OUT: 2000 mL / NET: -1300 mL    Physical Exam:  	Gen: NAD  	HEENT: MMM  	Pulm: CTA B/L  	CV: S1S2  	Abd: Soft, +BS  	Ext: No LE edema B/L                      Neuro: Awake   	Skin: Warm and Dry     LABS/STUDIES  --------------------------------------------------------------------------------              9.7    7.0   >-----------<  336      [09-04-19 @ 06:31]              28.0     138  |  99  |  100  ----------------------------<  207      [09-04-19 @ 06:31]  3.7   |  18  |  2.16        Ca     9.9     [09-04-19 @ 06:31]    Creatinine Trend:  SCr 2.16 [09-04 @ 06:31]  SCr 2.14 [09-03 @ 16:50]  SCr 2.15 [09-03 @ 06:40]  SCr 2.05 [09-02 @ 06:49]  SCr 2.14 [09-01 @ 06:24]    Urinalysis - [08-22-19 @ 16:43]      Color Light Yellow / Appearance Clear / SG 1.020 / pH 6.0      Gluc >=1000 mg/dL / Ketone Negative  / Bili Negative / Urobili <2 mg/dL       Blood Negative / Protein Negative / Leuk Est Negative / Nitrite Negative      RBC  / WBC  / Hyaline  / Gran  / Sq Epi  / Non Sq Epi  / Bacteria     Iron 42, TIBC 348, %sat 12      [07-05-19 @ 22:32]  Ferritin 130      [07-05-19 @ 22:32]  .4 (Ca --)      [04-25-19 @ 05:45]   --  PTH 43.55 (Ca --)      [09-10-18 @ 07:15]   --  PTH 36.60 (Ca --)      [09-08-18 @ 03:15]   --  Vitamin D (25OH) 25.9      [05-01-19 @ 04:00]  HbA1c 7.4      [07-05-19 @ 22:32]  TSH 1.02      [08-26-19 @ 08:22]  Lipid: chol 148, , HDL 35,       [06-01-19 @ 08:00]

## 2019-09-05 ENCOUNTER — TRANSCRIPTION ENCOUNTER (OUTPATIENT)
Age: 74
End: 2019-09-05

## 2019-09-05 VITALS — SYSTOLIC BLOOD PRESSURE: 108 MMHG | DIASTOLIC BLOOD PRESSURE: 58 MMHG

## 2019-09-05 LAB
GLUCOSE BLDC GLUCOMTR-MCNC: 233 MG/DL — HIGH (ref 70–99)
GLUCOSE BLDC GLUCOMTR-MCNC: 256 MG/DL — HIGH (ref 70–99)
GLUCOSE BLDC GLUCOMTR-MCNC: 256 MG/DL — HIGH (ref 70–99)
GLUCOSE BLDC GLUCOMTR-MCNC: 277 MG/DL — HIGH (ref 70–99)
HBA1C BLD-MCNC: 8.7 % — HIGH (ref 4–5.6)
HCT VFR BLD CALC: 27.8 % — LOW (ref 34.5–45)
HGB BLD-MCNC: 9 G/DL — LOW (ref 11.5–15.5)
MCHC RBC-ENTMCNC: 27.9 PG — SIGNIFICANT CHANGE UP (ref 27–34)
MCHC RBC-ENTMCNC: 32.2 GM/DL — SIGNIFICANT CHANGE UP (ref 32–36)
MCV RBC AUTO: 86.6 FL — SIGNIFICANT CHANGE UP (ref 80–100)
PLATELET # BLD AUTO: 351 K/UL — SIGNIFICANT CHANGE UP (ref 150–400)
RBC # BLD: 3.21 M/UL — LOW (ref 3.8–5.2)
RBC # FLD: 16 % — HIGH (ref 10.3–14.5)
WBC # BLD: 7.2 K/UL — SIGNIFICANT CHANGE UP (ref 3.8–10.5)
WBC # FLD AUTO: 7.2 K/UL — SIGNIFICANT CHANGE UP (ref 3.8–10.5)

## 2019-09-05 PROCEDURE — 99238 HOSP IP/OBS DSCHRG MGMT 30/<: CPT

## 2019-09-05 RX ORDER — INSULIN DETEMIR 100/ML (3)
82 INSULIN PEN (ML) SUBCUTANEOUS DAILY
Refills: 0 | Status: DISCONTINUED | OUTPATIENT
Start: 2019-09-05 | End: 2019-09-05

## 2019-09-05 RX ORDER — INSULIN GLARGINE 100 [IU]/ML
84 INJECTION, SOLUTION SUBCUTANEOUS
Qty: 0 | Refills: 0 | DISCHARGE

## 2019-09-05 RX ORDER — HYDRALAZINE HCL 50 MG
1 TABLET ORAL
Qty: 90 | Refills: 0
Start: 2019-09-05 | End: 2019-10-04

## 2019-09-05 RX ORDER — CLOPIDOGREL BISULFATE 75 MG/1
1 TABLET, FILM COATED ORAL
Qty: 30 | Refills: 0
Start: 2019-09-05 | End: 2019-10-04

## 2019-09-05 RX ORDER — INSULIN LISPRO 100/ML
64 VIAL (ML) SUBCUTANEOUS
Qty: 15 | Refills: 0
Start: 2019-09-05 | End: 2019-10-04

## 2019-09-05 RX ORDER — INSULIN DETEMIR 100/ML (3)
90 INSULIN PEN (ML) SUBCUTANEOUS
Qty: 15 | Refills: 1
Start: 2019-09-05 | End: 2019-11-03

## 2019-09-05 RX ORDER — SPIRONOLACTONE 25 MG/1
1 TABLET, FILM COATED ORAL
Qty: 30 | Refills: 0
Start: 2019-09-05 | End: 2019-10-04

## 2019-09-05 RX ORDER — POLYETHYLENE GLYCOL 3350 17 G/17G
17 POWDER, FOR SOLUTION ORAL
Qty: 333354 | Refills: 0
Start: 2019-09-05 | End: 2019-09-21

## 2019-09-05 RX ORDER — INSULIN LISPRO 100/ML
64 VIAL (ML) SUBCUTANEOUS
Refills: 0 | Status: DISCONTINUED | OUTPATIENT
Start: 2019-09-05 | End: 2019-09-05

## 2019-09-05 RX ORDER — ISOSORBIDE DINITRATE 5 MG/1
1 TABLET ORAL
Qty: 90 | Refills: 0
Start: 2019-09-05 | End: 2019-10-04

## 2019-09-05 RX ORDER — INSULIN DETEMIR 100/ML (3)
82 INSULIN PEN (ML) SUBCUTANEOUS
Qty: 15 | Refills: 0
Start: 2019-09-05 | End: 2019-10-04

## 2019-09-05 RX ORDER — METOPROLOL TARTRATE 50 MG
1 TABLET ORAL
Qty: 30 | Refills: 0
Start: 2019-09-05 | End: 2019-10-04

## 2019-09-05 RX ORDER — LIDOCAINE 4 G/100G
1 CREAM TOPICAL
Qty: 30 | Refills: 0
Start: 2019-09-05 | End: 2019-10-04

## 2019-09-05 RX ORDER — TICAGRELOR 90 MG/1
1 TABLET ORAL
Qty: 60 | Refills: 0
Start: 2019-09-05 | End: 2019-10-04

## 2019-09-05 RX ORDER — ALPRAZOLAM 0.25 MG
1 TABLET ORAL
Qty: 15 | Refills: 0
Start: 2019-09-05 | End: 2019-09-09

## 2019-09-05 RX ORDER — SODIUM BICARBONATE 1 MEQ/ML
1 SYRINGE (ML) INTRAVENOUS
Qty: 60 | Refills: 0
Start: 2019-09-05 | End: 2019-10-04

## 2019-09-05 RX ORDER — INSULIN DETEMIR 100/ML (3)
82 INSULIN PEN (ML) SUBCUTANEOUS
Qty: 15 | Refills: 1
Start: 2019-09-05 | End: 2019-11-03

## 2019-09-05 RX ORDER — INSULIN DETEMIR 100/ML (3)
90 INSULIN PEN (ML) SUBCUTANEOUS
Qty: 15 | Refills: 0
Start: 2019-09-05 | End: 2019-10-04

## 2019-09-05 RX ORDER — INSULIN LISPRO 100/ML
24 VIAL (ML) SUBCUTANEOUS
Qty: 0 | Refills: 0 | DISCHARGE

## 2019-09-05 RX ORDER — BUMETANIDE 0.25 MG/ML
3 INJECTION INTRAMUSCULAR; INTRAVENOUS
Qty: 180 | Refills: 0
Start: 2019-09-05 | End: 2019-10-04

## 2019-09-05 RX ORDER — COLCHICINE 0.6 MG
1 TABLET ORAL
Qty: 30 | Refills: 0
Start: 2019-09-05

## 2019-09-05 RX ORDER — IPRATROPIUM/ALBUTEROL SULFATE 18-103MCG
3 AEROSOL WITH ADAPTER (GRAM) INHALATION
Qty: 225 | Refills: 0
Start: 2019-09-05 | End: 2019-09-19

## 2019-09-05 RX ADMIN — PANTOPRAZOLE SODIUM 40 MILLIGRAM(S): 20 TABLET, DELAYED RELEASE ORAL at 06:39

## 2019-09-05 RX ADMIN — Medication 50 MICROGRAM(S): at 06:36

## 2019-09-05 RX ADMIN — INSULIN ASPART 4: 100 INJECTION, SOLUTION SUBCUTANEOUS at 08:28

## 2019-09-05 RX ADMIN — Medication 64 UNIT(S): at 17:20

## 2019-09-05 RX ADMIN — LIDOCAINE 1 PATCH: 4 CREAM TOPICAL at 10:00

## 2019-09-05 RX ADMIN — Medication 100 MILLIGRAM(S): at 17:23

## 2019-09-05 RX ADMIN — POLYETHYLENE GLYCOL 3350 17 GRAM(S): 17 POWDER, FOR SOLUTION ORAL at 12:21

## 2019-09-05 RX ADMIN — SPIRONOLACTONE 25 MILLIGRAM(S): 25 TABLET, FILM COATED ORAL at 06:43

## 2019-09-05 RX ADMIN — Medication 50 MILLIGRAM(S): at 06:37

## 2019-09-05 RX ADMIN — Medication 81 MILLIGRAM(S): at 12:21

## 2019-09-05 RX ADMIN — Medication 0.6 MILLIGRAM(S): at 12:22

## 2019-09-05 RX ADMIN — Medication 650 MILLIGRAM(S): at 06:36

## 2019-09-05 RX ADMIN — ISOSORBIDE DINITRATE 30 MILLIGRAM(S): 5 TABLET ORAL at 06:36

## 2019-09-05 RX ADMIN — Medication 100 MILLIGRAM(S): at 06:36

## 2019-09-05 RX ADMIN — Medication 64 UNIT(S): at 12:34

## 2019-09-05 RX ADMIN — Medication 25 MILLIGRAM(S): at 06:36

## 2019-09-05 RX ADMIN — Medication 75 UNIT(S): at 08:49

## 2019-09-05 RX ADMIN — Medication 1 TABLET(S): at 12:21

## 2019-09-05 RX ADMIN — INSULIN ASPART 6: 100 INJECTION, SOLUTION SUBCUTANEOUS at 17:20

## 2019-09-05 RX ADMIN — ISOSORBIDE DINITRATE 30 MILLIGRAM(S): 5 TABLET ORAL at 17:25

## 2019-09-05 RX ADMIN — HEPARIN SODIUM 5000 UNIT(S): 5000 INJECTION INTRAVENOUS; SUBCUTANEOUS at 17:23

## 2019-09-05 RX ADMIN — INSULIN ASPART 6: 100 INJECTION, SOLUTION SUBCUTANEOUS at 12:20

## 2019-09-05 RX ADMIN — INSULIN ASPART 60 UNIT(S): 100 INJECTION, SOLUTION SUBCUTANEOUS at 08:28

## 2019-09-05 RX ADMIN — CLOPIDOGREL BISULFATE 75 MILLIGRAM(S): 75 TABLET, FILM COATED ORAL at 12:21

## 2019-09-05 RX ADMIN — HEPARIN SODIUM 5000 UNIT(S): 5000 INJECTION INTRAVENOUS; SUBCUTANEOUS at 06:36

## 2019-09-05 RX ADMIN — MONTELUKAST 10 MILLIGRAM(S): 4 TABLET, CHEWABLE ORAL at 12:22

## 2019-09-05 RX ADMIN — Medication 50 MILLIGRAM(S): at 17:24

## 2019-09-05 RX ADMIN — LIDOCAINE 1 PATCH: 4 CREAM TOPICAL at 10:17

## 2019-09-05 RX ADMIN — Medication 0.25 MILLIGRAM(S): at 01:54

## 2019-09-05 RX ADMIN — BUMETANIDE 3 MILLIGRAM(S): 0.25 INJECTION INTRAMUSCULAR; INTRAVENOUS at 17:23

## 2019-09-05 RX ADMIN — Medication 650 MILLIGRAM(S): at 17:23

## 2019-09-05 NOTE — PROGRESS NOTE ADULT - REASON FOR ADMISSION
Hypoxia, SOB
ADHF
Hypoxia, SOB

## 2019-09-05 NOTE — PROGRESS NOTE ADULT - PROBLEM SELECTOR PLAN 5
Monitor labs, Renal FU Do not recommend increasing Xanax frequency. Do NOT recommend increasing Xanax frequency.    Can add gabapentin 600 mg TID. Do NOT recommend increasing Xanax frequency, can c/w home dose    Can add gabapentin 600 mg TID.

## 2019-09-05 NOTE — DISCHARGE NOTE NURSING/CASE MANAGEMENT/SOCIAL WORK - NSDCFUADDAPPT_GEN_ALL_CORE_FT
please follow up with your PMD in 1 week.   follow up with your cardiology and endocrinology in 1 ~ 2 weeks.

## 2019-09-05 NOTE — PROGRESS NOTE ADULT - PROBLEM SELECTOR PLAN 2
Will continue current Synthroid dose for now and monitor.  For d/c suggest to continue home dose and FU endocrinologist.

## 2019-09-05 NOTE — PROGRESS NOTE ADULT - PROBLEM SELECTOR PLAN 1
Will adjust current insulin regimen. Increase Levemir to 82u AM and 90u PM, Start Humalog 64u before each meal and discontinue pre-meal Novolog. Will continue sliding scale. Will continue monitoring FS, log, and follow up.   Stable for d/c to rehab given sugars <300 .  Suggest to continue current insulin management one d/c to rehab. She is a candidate for U500 or insulin pump, which can only be done as outpatient. FU outpatient endocrinologist within 2 weeks. Spoke with patient and primary team.

## 2019-09-05 NOTE — PROGRESS NOTE ADULT - ATTENDING COMMENTS
DC home on current insulin regimen, monitor FS, adjust insulin dose according to numbers, FU with endo in 1 w

## 2019-09-05 NOTE — PROGRESS NOTE ADULT - SUBJECTIVE AND OBJECTIVE BOX
[facility]   MD GONZALEZ SHAH D.O, PA    TELEPHONE NUMBERS:  OFFICE: [phone]  DR. SANTOYO CELL: [phone]  JUSTEN FIELD CELL: [phone]  DR. RIDER CELL:  [phone]    RENAL FOLLOW UP NOTE  --------------------------------------------------------------------------------  HPI: Pt seen and examined at bedside.   Keira SOB, chest pain     PAST HISTORY  --------------------------------------------------------------------------------  No significant changes to PMH, PSH, FHx, SHx, unless otherwise noted    ALLERGIES & MEDICATIONS  --------------------------------------------------------------------------------  Allergies    azithromycin (Hives; Pruritus)    Intolerances      Standing Inpatient Medications  acetaminophen  IVPB .. 1000 milliGRAM(s) IV Intermittent once  aspirin enteric coated 81 milliGRAM(s) Oral daily  atorvastatin 40 milliGRAM(s) Oral at bedtime  buMETAnide 3 milliGRAM(s) Oral every 12 hours  chlorhexidine 2% Cloths 1 Application(s) Topical daily  clopidogrel Tablet 75 milliGRAM(s) Oral daily  colchicine 0.6 milliGRAM(s) Oral daily  dextrose 5%. 1000 milliLiter(s) IV Continuous <Continuous>  dextrose 50% Injectable 12.5 Gram(s) IV Push once  dextrose 50% Injectable 25 Gram(s) IV Push once  dextrose 50% Injectable 25 Gram(s) IV Push once  docusate sodium 100 milliGRAM(s) Oral two times a day  heparin  Injectable 5000 Unit(s) SubCutaneous every 12 hours  hydrALAZINE 50 milliGRAM(s) Oral every 8 hours  insulin aspart (NovoLOG) corrective regimen sliding scale.   SubCutaneous three times a day before meals  insulin aspart (NovoLOG) corrective regimen sliding scale.   SubCutaneous at bedtime  insulin detemir injectable (LEVEMIR) 82 Unit(s) SubCutaneous daily  insulin detemir injectable (LEVEMIR) 90 Unit(s) SubCutaneous at bedtime  insulin lispro Injectable (HumaLOG) 64 Unit(s) SubCutaneous three times a day before meals  isosorbide   dinitrate Tablet (ISORDIL) 30 milliGRAM(s) Oral three times a day  lactated ringers. 1000 milliLiter(s) IV Continuous <Continuous>  levothyroxine 50 MICROGram(s) Oral daily  lidocaine   Patch 1 Patch Transdermal daily  melatonin 3 milliGRAM(s) Oral at bedtime  metoprolol succinate ER 25 milliGRAM(s) Oral daily  montelukast 10 milliGRAM(s) Oral daily  Nephro-enrico 1 Tablet(s) Oral daily  pantoprazole    Tablet 40 milliGRAM(s) Oral before breakfast  polyethylene glycol 3350 17 Gram(s) Oral daily  senna 2 Tablet(s) Oral at bedtime  sodium bicarbonate 650 milliGRAM(s) Oral two times a day  spironolactone 25 milliGRAM(s) Oral daily    PRN Inpatient Medications  acetaminophen   Tablet .. 650 milliGRAM(s) Oral every 6 hours PRN  ALBUTerol/ipratropium for Nebulization 3 milliLiter(s) Nebulizer every 6 hours PRN  ALPRAZolam 0.25 milliGRAM(s) Oral three times a day PRN  dextrose 40% Gel 15 Gram(s) Oral once PRN  glucagon  Injectable 1 milliGRAM(s) IntraMuscular once PRN      REVIEW OF SYSTEMS  --------------------------------------------------------------------------------  General: no fever  CVS: no chest pain  RESP: no sob, no cough  ABD: no abdominal pain  : no dysuria,  MSK: no edema     VITALS/PHYSICAL EXAM  --------------------------------------------------------------------------------  T(C): 36.7 (09-05-19 @ 04:07), Max: 36.7 (09-05-19 @ 04:07)  HR: 87 (09-05-19 @ 04:07) (85 - 87)  BP: 127/82 (09-05-19 @ 04:07) (104/61 - 127/82)  RR: 19 (09-05-19 @ 04:07) (18 - 19)  SpO2: 97% (09-05-19 @ 04:07) (97% - 100%)  Wt(kg): --        09-04-19 @ 07:01  -  09-05-19 @ 07:00  --------------------------------------------------------  IN: 840 mL / OUT: 1250 mL / NET: -410 mL    09-05-19 @ 07:01  -  09-05-19 @ 10:52  --------------------------------------------------------  IN: 120 mL / OUT: 800 mL / NET: -680 mL      Physical Exam:  	Gen: NAD  	HEENT: MMM  	Pulm: CTA B/L  	CV: S1S2  	Abd: Soft, +BS  	Ext: No LE edema B/L                      Neuro: Awake   	Skin: Warm and Dry     LABS/STUDIES  --------------------------------------------------------------------------------              9.0    7.2   >-----------<  351      [09-05-19 @ 05:50]              27.8     138  |  99  |  100  ----------------------------<  207      [09-04-19 @ 06:31]  3.7   |  18  |  2.16        Ca     9.9     [09-04-19 @ 06:31]    Creatinine Trend:  SCr 2.16 [09-04 @ 06:31]  SCr 2.14 [09-03 @ 16:50]  SCr 2.15 [09-03 @ 06:40]  SCr 2.05 [09-02 @ 06:49]  SCr 2.14 [09-01 @ 06:24]    Urinalysis - [08-22-19 @ 16:43]      Color Light Yellow / Appearance Clear / SG 1.020 / pH 6.0      Gluc >=1000 mg/dL / Ketone Negative  / Bili Negative / Urobili <2 mg/dL       Blood Negative / Protein Negative / Leuk Est Negative / Nitrite Negative      RBC  / WBC  / Hyaline  / Gran  / Sq Epi  / Non Sq Epi  / Bacteria       Iron 42, TIBC 348, %sat 12      [07-05-19 @ 22:32]  Ferritin 130      [07-05-19 @ 22:32]  .4 (Ca --)      [04-25-19 @ 05:45]   --  PTH 43.55 (Ca --)      [09-10-18 @ 07:15]   --  PTH 36.60 (Ca --)      [09-08-18 @ 03:15]   --  Vitamin D (25OH) 25.9      [05-01-19 @ 04:00]  HbA1c 7.4      [07-05-19 @ 22:32]  TSH 1.02      [08-26-19 @ 08:22]  Lipid: chol 148, , HDL 35,       [06-01-19 @ 08:00]

## 2019-09-05 NOTE — DISCHARGE NOTE NURSING/CASE MANAGEMENT/SOCIAL WORK - PATIENT PORTAL LINK FT
You can access the FollowMyHealth Patient Portal offered by [facility] by registering at the following website: [email or web address]. By joining [facility]’s FollowMyHealth portal, you will also be able to view your health information using other applications (apps) compatible with our system.

## 2019-09-05 NOTE — PROGRESS NOTE ADULT - SUBJECTIVE AND OBJECTIVE BOX
Chief complaint  Patient is a 73y old  Female who presents with a chief complaint of Hypoxia, SOB (05 Sep 2019 10:51)   Review of systems  Patient in bed, looks comfortable, no fever, no hypoglycemia.    Labs and Fingersticks  CAPILLARY BLOOD GLUCOSE      POCT Blood Glucose.: 277 mg/dL (05 Sep 2019 11:44)  POCT Blood Glucose.: 256 mg/dL (05 Sep 2019 08:47)  POCT Blood Glucose.: 233 mg/dL (05 Sep 2019 07:39)  POCT Blood Glucose.: 232 mg/dL (04 Sep 2019 22:46)  POCT Blood Glucose.: 241 mg/dL (04 Sep 2019 18:22)  POCT Blood Glucose.: 219 mg/dL (04 Sep 2019 18:05)      Anion Gap, Serum: 21 <H> (09-04 @ 06:31)  Anion Gap, Serum: 19 <H> (09-03 @ 16:50)      Calcium, Total Serum: 9.9 (09-04 @ 06:31)  Calcium, Total Serum: 9.7 (09-03 @ 16:50)          09-04    138  |  99  |  100<H>  ----------------------------<  207<H>  3.7   |  18<L>  |  2.16<H>    Ca    9.9      04 Sep 2019 06:31                          9.0    7.2   )-----------( 351      ( 05 Sep 2019 05:50 )             27.8     Medications  MEDICATIONS  (STANDING):  acetaminophen  IVPB .. 1000 milliGRAM(s) IV Intermittent once  aspirin enteric coated 81 milliGRAM(s) Oral daily  atorvastatin 40 milliGRAM(s) Oral at bedtime  buMETAnide 3 milliGRAM(s) Oral every 12 hours  chlorhexidine 2% Cloths 1 Application(s) Topical daily  clopidogrel Tablet 75 milliGRAM(s) Oral daily  colchicine 0.6 milliGRAM(s) Oral daily  dextrose 5%. 1000 milliLiter(s) (50 mL/Hr) IV Continuous <Continuous>  dextrose 50% Injectable 12.5 Gram(s) IV Push once  dextrose 50% Injectable 25 Gram(s) IV Push once  dextrose 50% Injectable 25 Gram(s) IV Push once  docusate sodium 100 milliGRAM(s) Oral two times a day  heparin  Injectable 5000 Unit(s) SubCutaneous every 12 hours  hydrALAZINE 50 milliGRAM(s) Oral every 8 hours  insulin aspart (NovoLOG) corrective regimen sliding scale.   SubCutaneous three times a day before meals  insulin aspart (NovoLOG) corrective regimen sliding scale.   SubCutaneous at bedtime  insulin detemir injectable (LEVEMIR) 82 Unit(s) SubCutaneous daily  insulin detemir injectable (LEVEMIR) 90 Unit(s) SubCutaneous at bedtime  insulin lispro Injectable (HumaLOG) 64 Unit(s) SubCutaneous three times a day before meals  isosorbide   dinitrate Tablet (ISORDIL) 30 milliGRAM(s) Oral three times a day  lactated ringers. 1000 milliLiter(s) (75 mL/Hr) IV Continuous <Continuous>  levothyroxine 50 MICROGram(s) Oral daily  lidocaine   Patch 1 Patch Transdermal daily  melatonin 3 milliGRAM(s) Oral at bedtime  metoprolol succinate ER 25 milliGRAM(s) Oral daily  montelukast 10 milliGRAM(s) Oral daily  Nephro-enrico 1 Tablet(s) Oral daily  pantoprazole    Tablet 40 milliGRAM(s) Oral before breakfast  polyethylene glycol 3350 17 Gram(s) Oral daily  senna 2 Tablet(s) Oral at bedtime  sodium bicarbonate 650 milliGRAM(s) Oral two times a day  spironolactone 25 milliGRAM(s) Oral daily      Physical Exam  General: Patient comfortable in bed  Vital Signs Last 12 Hrs  T(F): 98 (09-05-19 @ 04:07), Max: 98 (09-05-19 @ 04:07)  HR: 87 (09-05-19 @ 04:07) (87 - 87)  BP: 127/82 (09-05-19 @ 04:07) (127/82 - 127/82)  BP(mean): --  RR: 19 (09-05-19 @ 04:07) (19 - 19)  SpO2: 97% (09-05-19 @ 04:07) (97% - 97%)  Neck: No palpable thyroid nodules.  CVS: S1S2, No murmurs  Respiratory: No wheezing, no crepitations  GI: Abdomen soft, bowel sounds positive  Musculoskeletal:  edema lower extremities.   Skin: No skin rashes, no ecchymosis    Diagnostics Chief complaint  Patient is a 73y old  Female who presents with a chief complaint of Hypoxia, SOB (05 Sep 2019 10:51)   Review of systems  Patient in bed, looks comfortable, no fever,  no hypoglycemia.    Labs and Fingersticks  CAPILLARY BLOOD GLUCOSE      POCT Blood Glucose.: 277 mg/dL (05 Sep 2019 11:44)  POCT Blood Glucose.: 256 mg/dL (05 Sep 2019 08:47)  POCT Blood Glucose.: 233 mg/dL (05 Sep 2019 07:39)  POCT Blood Glucose.: 232 mg/dL (04 Sep 2019 22:46)  POCT Blood Glucose.: 241 mg/dL (04 Sep 2019 18:22)  POCT Blood Glucose.: 219 mg/dL (04 Sep 2019 18:05)      Anion Gap, Serum: 21 <H> (09-04 @ 06:31)  Anion Gap, Serum: 19 <H> (09-03 @ 16:50)      Calcium, Total Serum: 9.9 (09-04 @ 06:31)  Calcium, Total Serum: 9.7 (09-03 @ 16:50)          09-04    138  |  99  |  100<H>  ----------------------------<  207<H>  3.7   |  18<L>  |  2.16<H>    Ca    9.9      04 Sep 2019 06:31                          9.0    7.2   )-----------( 351      ( 05 Sep 2019 05:50 )             27.8     Medications  MEDICATIONS  (STANDING):  acetaminophen  IVPB .. 1000 milliGRAM(s) IV Intermittent once  aspirin enteric coated 81 milliGRAM(s) Oral daily  atorvastatin 40 milliGRAM(s) Oral at bedtime  buMETAnide 3 milliGRAM(s) Oral every 12 hours  chlorhexidine 2% Cloths 1 Application(s) Topical daily  clopidogrel Tablet 75 milliGRAM(s) Oral daily  colchicine 0.6 milliGRAM(s) Oral daily  dextrose 5%. 1000 milliLiter(s) (50 mL/Hr) IV Continuous <Continuous>  dextrose 50% Injectable 12.5 Gram(s) IV Push once  dextrose 50% Injectable 25 Gram(s) IV Push once  dextrose 50% Injectable 25 Gram(s) IV Push once  docusate sodium 100 milliGRAM(s) Oral two times a day  heparin  Injectable 5000 Unit(s) SubCutaneous every 12 hours  hydrALAZINE 50 milliGRAM(s) Oral every 8 hours  insulin aspart (NovoLOG) corrective regimen sliding scale.   SubCutaneous three times a day before meals  insulin aspart (NovoLOG) corrective regimen sliding scale.   SubCutaneous at bedtime  insulin detemir injectable (LEVEMIR) 82 Unit(s) SubCutaneous daily  insulin detemir injectable (LEVEMIR) 90 Unit(s) SubCutaneous at bedtime  insulin lispro Injectable (HumaLOG) 64 Unit(s) SubCutaneous three times a day before meals  isosorbide   dinitrate Tablet (ISORDIL) 30 milliGRAM(s) Oral three times a day  lactated ringers. 1000 milliLiter(s) (75 mL/Hr) IV Continuous <Continuous>  levothyroxine 50 MICROGram(s) Oral daily  lidocaine   Patch 1 Patch Transdermal daily  melatonin 3 milliGRAM(s) Oral at bedtime  metoprolol succinate ER 25 milliGRAM(s) Oral daily  montelukast 10 milliGRAM(s) Oral daily  Nephro-enrico 1 Tablet(s) Oral daily  pantoprazole    Tablet 40 milliGRAM(s) Oral before breakfast  polyethylene glycol 3350 17 Gram(s) Oral daily  senna 2 Tablet(s) Oral at bedtime  sodium bicarbonate 650 milliGRAM(s) Oral two times a day  spironolactone 25 milliGRAM(s) Oral daily      Physical Exam  General: Patient comfortable in bed  Vital Signs Last 12 Hrs  T(F): 98 (09-05-19 @ 04:07), Max: 98 (09-05-19 @ 04:07)  HR: 87 (09-05-19 @ 04:07) (87 - 87)  BP: 127/82 (09-05-19 @ 04:07) (127/82 - 127/82)  BP(mean): --  RR: 19 (09-05-19 @ 04:07) (19 - 19)  SpO2: 97% (09-05-19 @ 04:07) (97% - 97%)  Neck: No palpable thyroid nodules.  CVS: S1S2, No murmurs  Respiratory: No wheezing, no crepitations  GI: Abdomen soft, bowel sounds positive  Musculoskeletal:  edema lower extremities.   Skin: No skin rashes, no ecchymosis    Diagnostics

## 2019-09-05 NOTE — PROGRESS NOTE ADULT - ASSESSMENT
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 She is eating partial meals, weak, on large dose basal bolus insulin. Pending discharge to rehab.  CAD: S/P cabg On medications, stable, monitored.  Gout: ankle pain has much improved s/p colchiceine regimen as per rheum recommendations  Hypothyroidism: synthroid 50 mcg po daily, asymptomatic.  HTN: Controlled, On med.  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 She is eating partial meals, weak, on large dose basal bolus insulin. Pending discharge to rehab.  CAD: S/P cabg On medications, stable, monitored.  Gout: ankle pain has much improved s/p colchiceine regimen as per rheum recommendations  Hypothyroidism: synthroid 50 mcg po daily, asymptomatic.  HTN: Controlled, On med.  HLD; on statin  CKD: Monitor labs/BMP

## 2019-09-05 NOTE — PROGRESS NOTE ADULT - ASSESSMENT
Pt is a 72 yo woman with PMHx of HTN, T2DM, CAD s/p CABG (LIMA to LAD, SVG to OM, SVG to PDA 2014 at [facility]), non-dilated ICM (EF 20-25%), severe mitral regurgitation s/p mitral clip (6/13/19 Dr. Newman), severe pulm HTN, CKD, hypothyroidism admitted with worsening SOB.    A/P:  MERVIN  Likely due to cardiogenic shock  Renal function remains stable   subject to fluctuations based on Renal Perfusion.  Pt currently on Bumex 3 mg PO Q12 and spironolactone 25mg daily  - Scr currently stable   - Continue diuretic therapy per cardiology.  - Optimize glucose control  - Monitor renal function   - Avoid further nephrotoxics, NSAIDS, RCA    Hyponatremia  in the setting of hyperglycemia.   Currently stable. Monitor   Optimize glucose control    CKD stage 4:  baseline Scr 1.8-2.0. Scr stable today.   Renal function fluctuates sec to CHF  Monitor renal function at present    SOB:  in setting of HF. Improved on diuretic therapy.   Continue diuretics per cardiology    Acidosis   Sec to Renal failure  on sodium bicarb 650mg BID  Monitor serum Co2     Hypokalemia:  in the setting of diuretic therapy.   Serum potassium stable      Anemia:   Hb stable  Pt with iron deficiency anemia.

## 2019-09-05 NOTE — PROGRESS NOTE ADULT - SUBJECTIVE AND OBJECTIVE BOX
CHIEF COMPLAINT:    SUBJECTIVE:     REVIEW OF SYSTEMS:    CONSTITUTIONAL: (  )  weakness,  (  ) fevers or chills  EYES/ENT: (  )visual changes;     NECK: (  ) pain or stiffness  RESPIRATORY:   (  )cough, wheezing, hemoptysis;  (  ) shortness of breath  CARDIOVASCULAR:  (  )chest pain or palpitations  GASTROINTESTINAL:   (  )abdominal or epigastric pain.  (  ) nausea, vomiting, or hematemesis;   (   ) diarrhea or constipation.   GENITOURINARY:   (    ) dysuria, frequency or hematuria  NEUROLOGICAL:  (   ) numbness or weakness   All other review of systems is negative unless indicated above    Vital Signs Last 24 Hrs  T(C): 36.7 (05 Sep 2019 04:07), Max: 36.7 (05 Sep 2019 04:07)  T(F): 98 (05 Sep 2019 04:07), Max: 98 (05 Sep 2019 04:07)  HR: 87 (05 Sep 2019 04:07) (85 - 87)  BP: 127/82 (05 Sep 2019 04:07) (104/61 - 127/82)  BP(mean): --  RR: 19 (05 Sep 2019 04:07) (18 - 19)  SpO2: 97% (05 Sep 2019 04:07) (97% - 100%)    I&O's Summary    04 Sep 2019 07:01  -  05 Sep 2019 07:00  --------------------------------------------------------  IN: 840 mL / OUT: 1250 mL / NET: -410 mL        CAPILLARY BLOOD GLUCOSE      POCT Blood Glucose.: 233 mg/dL (05 Sep 2019 07:39)  POCT Blood Glucose.: 232 mg/dL (04 Sep 2019 22:46)  POCT Blood Glucose.: 241 mg/dL (04 Sep 2019 18:22)  POCT Blood Glucose.: 219 mg/dL (04 Sep 2019 18:05)  POCT Blood Glucose.: 269 mg/dL (04 Sep 2019 11:46)  POCT Blood Glucose.: 274 mg/dL (04 Sep 2019 09:39)      PHYSICAL EXAM:    Constitutional:  (   ) NAD,   (   )awake and alert  HEENT: PERR, EOMI,    Neck: Soft and supple, No LAD, No JVD  Respiratory:  (    Breath sounds are clear bilaterally,    (   ) wheezing, rales or rhonchi  Cardiovascular:     (   )S1 and S2, regular rate and rhythm, no Murmurs, gallops or rubs  Gastrointestinal:  (   )Bowel Sounds present, soft,   (  )nontender, nondistended,    Extremities:    (  ) peripheral edema  Vascular: 2+ peripheral pulses  Neurological:    (    )A/O x 3,   (  ) focal deficits  Musculoskeletal:    (   )  normal strength b/l upper  (     ) normal  lower extremities  Skin: No rashes    MEDICATIONS:  MEDICATIONS  (STANDING):  acetaminophen  IVPB .. 1000 milliGRAM(s) IV Intermittent once  aspirin enteric coated 81 milliGRAM(s) Oral daily  atorvastatin 40 milliGRAM(s) Oral at bedtime  buMETAnide 3 milliGRAM(s) Oral every 12 hours  chlorhexidine 2% Cloths 1 Application(s) Topical daily  clopidogrel Tablet 75 milliGRAM(s) Oral daily  colchicine 0.6 milliGRAM(s) Oral daily  dextrose 5%. 1000 milliLiter(s) (50 mL/Hr) IV Continuous <Continuous>  dextrose 50% Injectable 12.5 Gram(s) IV Push once  dextrose 50% Injectable 25 Gram(s) IV Push once  dextrose 50% Injectable 25 Gram(s) IV Push once  docusate sodium 100 milliGRAM(s) Oral two times a day  heparin  Injectable 5000 Unit(s) SubCutaneous every 12 hours  hydrALAZINE 50 milliGRAM(s) Oral every 8 hours  insulin aspart (NovoLOG) corrective regimen sliding scale.   SubCutaneous three times a day before meals  insulin aspart (NovoLOG) corrective regimen sliding scale.   SubCutaneous at bedtime  insulin aspart Injectable (NovoLOG) 60 Unit(s) SubCutaneous three times a day before meals  insulin detemir injectable (LEVEMIR) 75 Unit(s) SubCutaneous daily  insulin detemir injectable (LEVEMIR) 90 Unit(s) SubCutaneous at bedtime  isosorbide   dinitrate Tablet (ISORDIL) 30 milliGRAM(s) Oral three times a day  lactated ringers. 1000 milliLiter(s) (75 mL/Hr) IV Continuous <Continuous>  levothyroxine 50 MICROGram(s) Oral daily  lidocaine   Patch 1 Patch Transdermal daily  melatonin 3 milliGRAM(s) Oral at bedtime  metoprolol succinate ER 25 milliGRAM(s) Oral daily  montelukast 10 milliGRAM(s) Oral daily  Nephro-enrico 1 Tablet(s) Oral daily  pantoprazole    Tablet 40 milliGRAM(s) Oral before breakfast  polyethylene glycol 3350 17 Gram(s) Oral daily  senna 2 Tablet(s) Oral at bedtime  sodium bicarbonate 650 milliGRAM(s) Oral two times a day  spironolactone 25 milliGRAM(s) Oral daily      LABS: All Labs Reviewed:                        9.0    7.2   )-----------( 351      ( 05 Sep 2019 05:50 )             27.8     09-04    138  |  99  |  100<H>  ----------------------------<  207<H>  3.7   |  18<L>  |  2.16<H>    Ca    9.9      04 Sep 2019 06:31            Blood Culture:   Urine Culture      RADIOLOGY/EKG:    ASSESSMENT AND PLAN:    DVT PPX:    ADVANCED DIRECTIVE:    DISPOSITION: CHIEF COMPLAINT:  patient awake and verbal eating her breakfast without complaint she is asking when she can go home?  SUBJECTIVE:     REVIEW OF SYSTEMS:    CONSTITUTIONAL: (x  )  weakness,  (  ) fevers or chills  EYES/ENT: (  )visual changes;     NECK: (  ) pain or stiffness  RESPIRATORY:   (  )cough, wheezing, hemoptysis;  (  ) shortness of breath  CARDIOVASCULAR:  (  )chest pain or palpitations  GASTROINTESTINAL:   (  )abdominal or epigastric pain.  (  ) nausea, vomiting, or hematemesis;   (   ) diarrhea or constipation.   GENITOURINARY:   (    ) dysuria, frequency or hematuria  NEUROLOGICAL:  (   ) numbness or weakness   All other review of systems is negative unless indicated above    Vital Signs Last 24 Hrs  T(C): 36.7 (05 Sep 2019 04:07), Max: 36.7 (05 Sep 2019 04:07)  T(F): 98 (05 Sep 2019 04:07), Max: 98 (05 Sep 2019 04:07)  HR: 87 (05 Sep 2019 04:07) (85 - 87)  BP: 127/82 (05 Sep 2019 04:07) (104/61 - 127/82)  BP(mean): --  RR: 19 (05 Sep 2019 04:07) (18 - 19)  SpO2: 97% (05 Sep 2019 04:07) (97% - 100%)    I&O's Summary    04 Sep 2019 07:01  -  05 Sep 2019 07:00  --------------------------------------------------------  IN: 840 mL / OUT: 1250 mL / NET: -410 mL        CAPILLARY BLOOD GLUCOSE      POCT Blood Glucose.: 233 mg/dL (05 Sep 2019 07:39)  POCT Blood Glucose.: 232 mg/dL (04 Sep 2019 22:46)  POCT Blood Glucose.: 241 mg/dL (04 Sep 2019 18:22)  POCT Blood Glucose.: 219 mg/dL (04 Sep 2019 18:05)  POCT Blood Glucose.: 269 mg/dL (04 Sep 2019 11:46)  POCT Blood Glucose.: 274 mg/dL (04 Sep 2019 09:39)    POCT Blood Glucose.: 240 mg/dL (04 Sep 2019 07:32)  POCT Blood Glucose.: 215 mg/dL (03 Sep 2019 22:34)  POCT Blood Glucose.: 223 mg/dL (03 Sep 2019 17:53)  POCT Blood Glucose.: 254 mg/dL (03 Sep 2019 16:41)  POCT Blood Glucose.: 393 mg/dL (03 Sep 2019 11:46)    PHYSICAL EXAM:  Neck: No palpable thyroid nodules.  CVS: S1S2, No murmurs  Respiratory: No wheezing, no crepitations  GI: Abdomen soft, bowel sounds positive  Musculoskeletal:  edema lower extremities.   Skin: No skin rashes, no ecchymosis      MEDICATIONS:  MEDICATIONS  (STANDING):  acetaminophen  IVPB .. 1000 milliGRAM(s) IV Intermittent once  aspirin enteric coated 81 milliGRAM(s) Oral daily  atorvastatin 40 milliGRAM(s) Oral at bedtime  buMETAnide 3 milliGRAM(s) Oral every 12 hours  chlorhexidine 2% Cloths 1 Application(s) Topical daily  clopidogrel Tablet 75 milliGRAM(s) Oral daily  colchicine 0.6 milliGRAM(s) Oral daily  dextrose 5%. 1000 milliLiter(s) (50 mL/Hr) IV Continuous <Continuous>  dextrose 50% Injectable 12.5 Gram(s) IV Push once  dextrose 50% Injectable 25 Gram(s) IV Push once  dextrose 50% Injectable 25 Gram(s) IV Push once  docusate sodium 100 milliGRAM(s) Oral two times a day  heparin  Injectable 5000 Unit(s) SubCutaneous every 12 hours  hydrALAZINE 50 milliGRAM(s) Oral every 8 hours  insulin aspart (NovoLOG) corrective regimen sliding scale.   SubCutaneous three times a day before meals  insulin aspart (NovoLOG) corrective regimen sliding scale.   SubCutaneous at bedtime  insulin aspart Injectable (NovoLOG) 60 Unit(s) SubCutaneous three times a day before meals  insulin detemir injectable (LEVEMIR) 75 Unit(s) SubCutaneous daily  insulin detemir injectable (LEVEMIR) 90 Unit(s) SubCutaneous at bedtime  isosorbide   dinitrate Tablet (ISORDIL) 30 milliGRAM(s) Oral three times a day  lactated ringers. 1000 milliLiter(s) (75 mL/Hr) IV Continuous <Continuous>  levothyroxine 50 MICROGram(s) Oral daily  lidocaine   Patch 1 Patch Transdermal daily  melatonin 3 milliGRAM(s) Oral at bedtime  metoprolol succinate ER 25 milliGRAM(s) Oral daily  montelukast 10 milliGRAM(s) Oral daily  Nephro-enrico 1 Tablet(s) Oral daily  pantoprazole    Tablet 40 milliGRAM(s) Oral before breakfast  polyethylene glycol 3350 17 Gram(s) Oral daily  senna 2 Tablet(s) Oral at bedtime  sodium bicarbonate 650 milliGRAM(s) Oral two times a day  spironolactone 25 milliGRAM(s) Oral daily      LABS: All Labs Reviewed:                        9.0    7.2   )-----------( 351      ( 05 Sep 2019 05:50 )             27.8     09-04    138  |  99  |  100<H>  ----------------------------<  207<H>  3.7   |  18<L>  |  2.16<H>    Ca    9.9      04 Sep 2019 06:31            Blood Culture:   Urine Culture      RADIOLOGY/EKG:    ASSESSMENT AND PLAN:  patient condition remained stable glycemic control  was improving but  Prandin was discontinued and patient was started on Levemir unfortunately the pharmacy did not approve U5 100  This case was discussed in with medical team in detail     #diabetes continue             insulin aspart (NovoLOG) corrective regimen sliding scale.   SubCutaneous three times a day before meals  insulin aspart (NovoLOG) corrective regimen sliding scale.   SubCutaneous at bedtime  insulin aspart Injectable (NovoLOG) 60 Unit(s) SubCutaneous three times a day before meals  insulin detemir injectable (LEVEMIR) 75 Unit(s) SubCutaneous daily  insulin detemir injectable (LEVEMIR) 90 Unit(s) SubCutaneous at bedtime          #CHF CAD stable continue Bumex 3 mg twice a day                    Family wants to take her home over all she is high risk for readmission and intubation at home she is full code at present time  DVT PPX:    ADVANCED DIRECTIVE:    DISPOSITION:discharge planning after Endocrinology consult discussed with medical team to also contact  PMD AS AN OUTPATIENT SECONDARY TO HIGH DOSE OF REQUIREMENT INSULIN

## 2019-09-06 NOTE — PROGRESS NOTE ADULT - PROBLEM SELECTOR PROBLEM 3
THIS IS NOT AN OFFICE VISIT. THIS IS AN ABSTRACT ENCOUNTER CREATED IN PREPARATION OF AN UPCOMING VISIT, NOT TO BE USED FOR DOCUMENTATION/TREATMENT PURPOSES.      Consult for bradycardia, referred by Dr. Westley YUN: limited information/records    Kira Ahumada is a 74 year old female seen at the request of Dr. Christy for further evaluation and management of bradycardia. Per reports she was seen by Cardiology and stated she had a heart rate in the 40's during cataract surgery in 2018. Per Cardiology reports strips reveal PVC's (strips unavailable). EKG obtained 8/12/19 revealed sinus bradycardia with PVC's in pattern of bigeminy.       Bradycardia  Symptomatic with patient reported heart rate in the 40's (update)  Diagnosed 2018 during cataract  Surgery, per Cardiology strips revealed PVC's (strips not available)  Initially seen in EP 9/9/19  PVC's  Other Medical History  Tobacco abuse  EKG/Cardiac Monitoring  EKG 8/12/19: SB with PVC's in pattern of bigeminy   Cardiac Imaging  No echo/stess on file  Labs   No labs on file   Gastroesophageal reflux disease without esophagitis

## 2019-09-07 LAB — INSULIN HUMAN IGE QN: 1.2 U/ML — HIGH

## 2019-09-11 NOTE — CHART NOTE - NSCHARTNOTESELECT_GEN_ALL_CORE
Nutrition Services
CCU Midnight/Event Note
ER VISIT/Event Note
Elevated troponin/Event Note
Endocrine/Event Note
Event Note
Event Note/Hep gtt
Event Note/Medicine NP
Event Note/chest pressure
Event Note/medicine/episodic- cp
Event Note/phone call to the family
Heparin gtt/Event Note
Medical Necessity/Event Note
Medicine NP/Event Note
Nutrition Services
Transfer Note
tachycardia/Event Note

## 2019-09-11 NOTE — CHART NOTE - NSCHARTNOTEFT_GEN_A_CORE
follow-up for patient after discharge spoke with her daughter her condition is stable she was seen by her primary care physician on 9/9/2019 continue current insulin still her blood sugar at home is elevated but patient is stable

## 2019-09-12 NOTE — PATIENT PROFILE ADULT - NSPROHMDIABETMGMTSTRAT_GEN_A_NUR
Other
routine screenings/medication therapy/insulin therapy/blood glucose testing/diet modification/weight management/exercise

## 2019-09-16 ENCOUNTER — APPOINTMENT (OUTPATIENT)
Dept: CARDIOLOGY | Facility: CLINIC | Age: 74
End: 2019-09-16

## 2019-09-19 PROCEDURE — 80048 BASIC METABOLIC PNL TOTAL CA: CPT

## 2019-09-19 PROCEDURE — 82533 TOTAL CORTISOL: CPT

## 2019-09-19 PROCEDURE — 85730 THROMBOPLASTIN TIME PARTIAL: CPT

## 2019-09-19 PROCEDURE — 83735 ASSAY OF MAGNESIUM: CPT

## 2019-09-19 PROCEDURE — 83880 ASSAY OF NATRIURETIC PEPTIDE: CPT

## 2019-09-19 PROCEDURE — 84295 ASSAY OF SERUM SODIUM: CPT

## 2019-09-19 PROCEDURE — 82435 ASSAY OF BLOOD CHLORIDE: CPT

## 2019-09-19 PROCEDURE — 85014 HEMATOCRIT: CPT

## 2019-09-19 PROCEDURE — 82272 OCCULT BLD FECES 1-3 TESTS: CPT

## 2019-09-19 PROCEDURE — 80299 QUANTITATIVE ASSAY DRUG: CPT

## 2019-09-19 PROCEDURE — 82947 ASSAY GLUCOSE BLOOD QUANT: CPT

## 2019-09-19 PROCEDURE — 86923 COMPATIBILITY TEST ELECTRIC: CPT

## 2019-09-19 PROCEDURE — 83550 IRON BINDING TEST: CPT

## 2019-09-19 PROCEDURE — 99291 CRITICAL CARE FIRST HOUR: CPT | Mod: 25

## 2019-09-19 PROCEDURE — 94660 CPAP INITIATION&MGMT: CPT

## 2019-09-19 PROCEDURE — 84484 ASSAY OF TROPONIN QUANT: CPT

## 2019-09-19 PROCEDURE — 86850 RBC ANTIBODY SCREEN: CPT

## 2019-09-19 PROCEDURE — 71045 X-RAY EXAM CHEST 1 VIEW: CPT

## 2019-09-19 PROCEDURE — 84300 ASSAY OF URINE SODIUM: CPT

## 2019-09-19 PROCEDURE — 97162 PT EVAL MOD COMPLEX 30 MIN: CPT

## 2019-09-19 PROCEDURE — 85379 FIBRIN DEGRADATION QUANT: CPT

## 2019-09-19 PROCEDURE — 82570 ASSAY OF URINE CREATININE: CPT

## 2019-09-19 PROCEDURE — 86900 BLOOD TYPING SEROLOGIC ABO: CPT

## 2019-09-19 PROCEDURE — C8929: CPT

## 2019-09-19 PROCEDURE — 82728 ASSAY OF FERRITIN: CPT

## 2019-09-19 PROCEDURE — 86337 INSULIN ANTIBODIES: CPT

## 2019-09-19 PROCEDURE — 80076 HEPATIC FUNCTION PANEL: CPT

## 2019-09-19 PROCEDURE — 85610 PROTHROMBIN TIME: CPT

## 2019-09-19 PROCEDURE — 82330 ASSAY OF CALCIUM: CPT

## 2019-09-19 PROCEDURE — 94640 AIRWAY INHALATION TREATMENT: CPT

## 2019-09-19 PROCEDURE — 84540 ASSAY OF URINE/UREA-N: CPT

## 2019-09-19 PROCEDURE — 82962 GLUCOSE BLOOD TEST: CPT

## 2019-09-19 PROCEDURE — 84443 ASSAY THYROID STIM HORMONE: CPT

## 2019-09-19 PROCEDURE — 82550 ASSAY OF CK (CPK): CPT

## 2019-09-19 PROCEDURE — 93005 ELECTROCARDIOGRAM TRACING: CPT

## 2019-09-19 PROCEDURE — 96374 THER/PROPH/DIAG INJ IV PUSH: CPT

## 2019-09-19 PROCEDURE — 97116 GAIT TRAINING THERAPY: CPT

## 2019-09-19 PROCEDURE — 83540 ASSAY OF IRON: CPT

## 2019-09-19 PROCEDURE — 80162 ASSAY OF DIGOXIN TOTAL: CPT

## 2019-09-19 PROCEDURE — 82803 BLOOD GASES ANY COMBINATION: CPT

## 2019-09-19 PROCEDURE — P9011: CPT

## 2019-09-19 PROCEDURE — 81001 URINALYSIS AUTO W/SCOPE: CPT

## 2019-09-19 PROCEDURE — 82010 KETONE BODYS QUAN: CPT

## 2019-09-19 PROCEDURE — 82985 ASSAY OF GLYCATED PROTEIN: CPT

## 2019-09-19 PROCEDURE — 80202 ASSAY OF VANCOMYCIN: CPT

## 2019-09-19 PROCEDURE — 83605 ASSAY OF LACTIC ACID: CPT

## 2019-09-19 PROCEDURE — 84100 ASSAY OF PHOSPHORUS: CPT

## 2019-09-19 PROCEDURE — 87040 BLOOD CULTURE FOR BACTERIA: CPT

## 2019-09-19 PROCEDURE — 80053 COMPREHEN METABOLIC PANEL: CPT

## 2019-09-19 PROCEDURE — 85027 COMPLETE CBC AUTOMATED: CPT

## 2019-09-19 PROCEDURE — 83036 HEMOGLOBIN GLYCOSYLATED A1C: CPT

## 2019-09-19 PROCEDURE — 84439 ASSAY OF FREE THYROXINE: CPT

## 2019-09-19 PROCEDURE — 86901 BLOOD TYPING SEROLOGIC RH(D): CPT

## 2019-09-19 PROCEDURE — 97530 THERAPEUTIC ACTIVITIES: CPT

## 2019-09-19 PROCEDURE — 81003 URINALYSIS AUTO W/O SCOPE: CPT

## 2019-09-19 PROCEDURE — 82553 CREATINE MB FRACTION: CPT

## 2019-09-19 PROCEDURE — 84132 ASSAY OF SERUM POTASSIUM: CPT

## 2019-09-19 PROCEDURE — 74018 RADEX ABDOMEN 1 VIEW: CPT

## 2019-09-19 PROCEDURE — 36430 TRANSFUSION BLD/BLD COMPNT: CPT

## 2019-09-19 PROCEDURE — 84550 ASSAY OF BLOOD/URIC ACID: CPT

## 2019-09-30 ENCOUNTER — APPOINTMENT (OUTPATIENT)
Dept: CARDIOLOGY | Facility: CLINIC | Age: 74
End: 2019-09-30
Payer: MEDICARE

## 2019-09-30 VITALS
BODY MASS INDEX: 24.98 KG/M2 | HEIGHT: 58 IN | OXYGEN SATURATION: 95 % | HEART RATE: 89 BPM | WEIGHT: 119 LBS | RESPIRATION RATE: 16 BRPM | SYSTOLIC BLOOD PRESSURE: 97 MMHG | DIASTOLIC BLOOD PRESSURE: 57 MMHG

## 2019-09-30 DIAGNOSIS — Z95.818 OTHER SPECIFIED POSTPROCEDURAL STATES: ICD-10-CM

## 2019-09-30 DIAGNOSIS — Z98.890 OTHER SPECIFIED POSTPROCEDURAL STATES: ICD-10-CM

## 2019-09-30 DIAGNOSIS — I25.10 ATHEROSCLEROTIC HEART DISEASE OF NATIVE CORONARY ARTERY W/OUT ANGINA PECTORIS: ICD-10-CM

## 2019-09-30 DIAGNOSIS — I34.0 NONRHEUMATIC MITRAL (VALVE) INSUFFICIENCY: ICD-10-CM

## 2019-09-30 DIAGNOSIS — N18.9 DISORDER OF KIDNEY AND URETER, UNSPECIFIED: ICD-10-CM

## 2019-09-30 DIAGNOSIS — N28.9 DISORDER OF KIDNEY AND URETER, UNSPECIFIED: ICD-10-CM

## 2019-09-30 DIAGNOSIS — I50.23 ACUTE ON CHRONIC SYSTOLIC (CONGESTIVE) HEART FAILURE: ICD-10-CM

## 2019-09-30 PROCEDURE — 99214 OFFICE O/P EST MOD 30 MIN: CPT

## 2019-09-30 RX ORDER — INSULIN ASPART 100 [IU]/ML
100 INJECTION, SOLUTION INTRAVENOUS; SUBCUTANEOUS
Qty: 30 | Refills: 0 | Status: DISCONTINUED | COMMUNITY
Start: 2018-12-05 | End: 2019-09-30

## 2019-09-30 RX ORDER — INSULIN LISPRO 100 [IU]/ML
100 INJECTION, SOLUTION INTRAVENOUS; SUBCUTANEOUS
Refills: 0 | Status: ACTIVE | COMMUNITY
Start: 2019-09-30

## 2019-09-30 RX ORDER — ISOSORBIDE DINITRATE 30 MG/1
30 TABLET ORAL DAILY
Refills: 0 | Status: DISCONTINUED | COMMUNITY
End: 2019-09-30

## 2019-09-30 RX ORDER — CLOPIDOGREL BISULFATE 75 MG/1
75 TABLET, FILM COATED ORAL DAILY
Refills: 0 | Status: ACTIVE | COMMUNITY
Start: 2019-09-30

## 2019-09-30 RX ORDER — TICAGRELOR 90 MG/1
90 TABLET ORAL TWICE DAILY
Refills: 0 | Status: DISCONTINUED | COMMUNITY
Start: 2018-11-30 | End: 2019-09-30

## 2019-09-30 RX ORDER — INSULIN GLARGINE 100 [IU]/ML
100 INJECTION, SOLUTION SUBCUTANEOUS
Qty: 1 | Refills: 0 | Status: DISCONTINUED | COMMUNITY
End: 2019-09-30

## 2019-10-01 ENCOUNTER — OUTPATIENT (OUTPATIENT)
Dept: OUTPATIENT SERVICES | Facility: HOSPITAL | Age: 74
LOS: 1 days | End: 2019-10-01

## 2019-10-01 DIAGNOSIS — Z95.1 PRESENCE OF AORTOCORONARY BYPASS GRAFT: Chronic | ICD-10-CM

## 2019-10-01 LAB
ANION GAP SERPL CALC-SCNC: 18 MMOL/L
BUN SERPL-MCNC: 60 MG/DL
CALCIUM SERPL-MCNC: 9.2 MG/DL
CHLORIDE SERPL-SCNC: 100 MMOL/L
CO2 SERPL-SCNC: 18 MMOL/L
CREAT SERPL-MCNC: 2.02 MG/DL
GLUCOSE SERPL-MCNC: 264 MG/DL
MAGNESIUM SERPL-MCNC: 2.5 MG/DL
NT-PROBNP SERPL-MCNC: 6852 PG/ML
POTASSIUM SERPL-SCNC: 4.6 MMOL/L
SODIUM SERPL-SCNC: 136 MMOL/L

## 2019-10-07 ENCOUNTER — MEDICATION RENEWAL (OUTPATIENT)
Age: 74
End: 2019-10-07

## 2019-10-07 ENCOUNTER — INPATIENT (INPATIENT)
Facility: HOSPITAL | Age: 74
LOS: 10 days | Discharge: HOME | DRG: 270 | End: 2019-10-18
Attending: INTERNAL MEDICINE | Admitting: INTERNAL MEDICINE
Payer: MEDICARE

## 2019-10-07 VITALS
WEIGHT: 117.07 LBS | HEART RATE: 57 BPM | HEIGHT: 59 IN | SYSTOLIC BLOOD PRESSURE: 111 MMHG | OXYGEN SATURATION: 98 % | DIASTOLIC BLOOD PRESSURE: 72 MMHG | RESPIRATION RATE: 48 BRPM

## 2019-10-07 DIAGNOSIS — Z95.1 PRESENCE OF AORTOCORONARY BYPASS GRAFT: Chronic | ICD-10-CM

## 2019-10-07 LAB
ALBUMIN SERPL ELPH-MCNC: 4.1 G/DL — SIGNIFICANT CHANGE UP (ref 3.3–5)
ALP SERPL-CCNC: 158 U/L — HIGH (ref 40–120)
ALT FLD-CCNC: 263 U/L — HIGH (ref 10–45)
ANION GAP SERPL CALC-SCNC: 34 MMOL/L — HIGH (ref 5–17)
APTT BLD: 35 SEC — SIGNIFICANT CHANGE UP (ref 27.5–36.3)
AST SERPL-CCNC: 405 U/L — HIGH (ref 10–40)
BILIRUB SERPL-MCNC: 1.3 MG/DL — HIGH (ref 0.2–1.2)
BUN SERPL-MCNC: 89 MG/DL — HIGH (ref 7–23)
CALCIUM SERPL-MCNC: 9.4 MG/DL — SIGNIFICANT CHANGE UP (ref 8.4–10.5)
CHLORIDE SERPL-SCNC: 96 MMOL/L — SIGNIFICANT CHANGE UP (ref 96–108)
CO2 SERPL-SCNC: 6 MMOL/L — CRITICAL LOW (ref 22–31)
CREAT SERPL-MCNC: 3 MG/DL — HIGH (ref 0.5–1.3)
GAS PNL BLDA: SIGNIFICANT CHANGE UP
GAS PNL BLDV: SIGNIFICANT CHANGE UP
GLUCOSE SERPL-MCNC: 51 MG/DL — LOW (ref 70–99)
HCT VFR BLD CALC: 37.3 % — SIGNIFICANT CHANGE UP (ref 34.5–45)
HGB BLD-MCNC: 10.4 G/DL — LOW (ref 11.5–15.5)
INR BLD: 1.48 RATIO — HIGH (ref 0.88–1.16)
MCHC RBC-ENTMCNC: 26 PG — LOW (ref 27–34)
MCHC RBC-ENTMCNC: 27.9 GM/DL — LOW (ref 32–36)
MCV RBC AUTO: 93.3 FL — SIGNIFICANT CHANGE UP (ref 80–100)
NT-PROBNP SERPL-SCNC: HIGH PG/ML (ref 0–300)
PLATELET # BLD AUTO: 532 K/UL — HIGH (ref 150–400)
POTASSIUM SERPL-MCNC: 6.6 MMOL/L — CRITICAL HIGH (ref 3.5–5.3)
POTASSIUM SERPL-SCNC: 6.6 MMOL/L — CRITICAL HIGH (ref 3.5–5.3)
PROT SERPL-MCNC: 8.5 G/DL — HIGH (ref 6–8.3)
PROTHROM AB SERPL-ACNC: 17.1 SEC — HIGH (ref 10–12.9)
RBC # BLD: 4 M/UL — SIGNIFICANT CHANGE UP (ref 3.8–5.2)
RBC # FLD: 19.2 % — HIGH (ref 10.3–14.5)
SODIUM SERPL-SCNC: 136 MMOL/L — SIGNIFICANT CHANGE UP (ref 135–145)
TROPONIN T, HIGH SENSITIVITY RESULT: 1842 NG/L — HIGH (ref 0–51)
WBC # BLD: 12.28 K/UL — HIGH (ref 3.8–10.5)
WBC # FLD AUTO: 12.28 K/UL — HIGH (ref 3.8–10.5)

## 2019-10-07 PROCEDURE — 93010 ELECTROCARDIOGRAM REPORT: CPT | Mod: GC

## 2019-10-07 PROCEDURE — 71045 X-RAY EXAM CHEST 1 VIEW: CPT | Mod: 26

## 2019-10-07 PROCEDURE — 99291 CRITICAL CARE FIRST HOUR: CPT | Mod: GC

## 2019-10-07 RX ORDER — PIPERACILLIN AND TAZOBACTAM 4; .5 G/20ML; G/20ML
3.38 INJECTION, POWDER, LYOPHILIZED, FOR SOLUTION INTRAVENOUS ONCE
Refills: 0 | Status: COMPLETED | OUTPATIENT
Start: 2019-10-07 | End: 2019-10-07

## 2019-10-07 RX ORDER — BUMETANIDE 0.25 MG/ML
1 INJECTION INTRAMUSCULAR; INTRAVENOUS ONCE
Refills: 0 | Status: DISCONTINUED | OUTPATIENT
Start: 2019-10-07 | End: 2019-10-07

## 2019-10-07 RX ORDER — DEXTROSE 50 % IN WATER 50 %
50 SYRINGE (ML) INTRAVENOUS ONCE
Refills: 0 | Status: COMPLETED | OUTPATIENT
Start: 2019-10-07 | End: 2019-10-07

## 2019-10-07 RX ORDER — VANCOMYCIN HCL 1 G
1000 VIAL (EA) INTRAVENOUS ONCE
Refills: 0 | Status: COMPLETED | OUTPATIENT
Start: 2019-10-07 | End: 2019-10-07

## 2019-10-07 RX ORDER — ASPIRIN/CALCIUM CARB/MAGNESIUM 324 MG
162 TABLET ORAL ONCE
Refills: 0 | Status: COMPLETED | OUTPATIENT
Start: 2019-10-07 | End: 2019-10-07

## 2019-10-07 RX ADMIN — Medication 162 MILLIGRAM(S): at 23:52

## 2019-10-07 RX ADMIN — Medication 50 MILLILITER(S): at 22:43

## 2019-10-07 RX ADMIN — Medication 250 MILLIGRAM(S): at 23:24

## 2019-10-07 RX ADMIN — PIPERACILLIN AND TAZOBACTAM 200 GRAM(S): 4; .5 INJECTION, POWDER, LYOPHILIZED, FOR SOLUTION INTRAVENOUS at 22:34

## 2019-10-07 NOTE — ED ADULT NURSE NOTE - OBJECTIVE STATEMENT
74 y/o F presents to the ED c/o difficulty breathing.  hx of CAD s/p CABG / stents, CHF on spironolactone, bumex.  Pt Kinyarwanda speaking family at bedside helping translate.  Pt c/o SOB and CP x2 days.  Pt states she was admitted 2 months ago and was d/c last month.  Pt states this is similar to that incident. Pt c/o mild chills as well.  Pt presents tachypneic to the 40's and hypotensive.  Pt placed on BiPAP in the ED.  Pt placed on cardiac monitor, EKG complete and given to MD Cooley.  Pt to be worked up for CHF and sepsis.

## 2019-10-07 NOTE — ED PROVIDER NOTE - ATTENDING CONTRIBUTION TO CARE
73yr F h xof CAD s/p CABG and stents CHF w recent change in meds (decreased spirinolactone) p/w 2 days of vomiting and progressive sOB. continued to get worse so was seen here. on arrival appeared critically ill and tachypneic, bipap started, had clear lungs on exam and no worsening leg swelling.  pt was briefly hypoglycemic and was given D50 with improvement. patient is thought to be either in chf exacerbation or infection. antibiotics started empircally.  signed out to Dr Sharpe pending admission and reeval.

## 2019-10-07 NOTE — ED CLERICAL - NS ED CLERK NOTE PRE-ARRIVAL INFORMATION; ADDITIONAL PRE-ARRIVAL INFORMATION
This patient is enrolled in the Heart Failure STARS readmission reduction initiative and has active care navigation. This patient can be followed up by the care navigation team within 24 hours.  To arrange close follow-up or to obtain additional clinical information, please call the contact number above.   For patients followed by the [facility] cardiology heart failure team, please call the on call cardiomyopathy attending ([phone]) for ALL PATIENTS PRIOR to decision for admission or observation.   Consider CDU for management of CHF exacerbations per guidelines.

## 2019-10-07 NOTE — ED PROVIDER NOTE - OBJECTIVE STATEMENT
74yo F with hx of CAD s/p CABG / stents, CHF on spironolactone, bumex presents with difficulty breathing for 2 days. CP for 2 days. Exertional. Felt similar to when she was admitted for few months ago, discharged 1 month ago. Denies f/c.

## 2019-10-07 NOTE — ED ADULT NURSE NOTE - NSIMPLEMENTINTERV_GEN_ALL_ED
Implemented All Fall Risk Interventions:  [place] to call system. Call bell, personal items and telephone within reach. Instruct patient to call for assistance. Room bathroom lighting operational. Non-slip footwear when patient is off stretcher. Physically safe environment: no spills, clutter or unnecessary equipment. Stretcher in lowest position, wheels locked, appropriate side rails in place. Provide visual cue, wrist band, yellow gown, etc. Monitor gait and stability. Monitor for mental status changes and reorient to person, place, and time. Review medications for side effects contributing to fall risk. Reinforce activity limits and safety measures with patient and family.

## 2019-10-07 NOTE — ED PROVIDER NOTE - PHYSICAL EXAMINATION
Gen: Lethargic, SOB  Head: NCAT  HEENT: PERRL, MMM, normal conjunctiva, anicteric, neck supple  Lung: CTAB, no adventitious sounds  CV: RRR, no murmurs, rubs or gallops  Abd: soft, NTND, no rebound or guarding, no CVAT  Neuro: No focal neurologic deficits. CN II-XII grossly intact. 5/5 strength and normal sensation in all extremities.  Skin: Warm and dry, no evidence of rash

## 2019-10-07 NOTE — ED PROVIDER NOTE - CLINICAL SUMMARY MEDICAL DECISION MAKING FREE TEXT BOX
SOB - bipap placed, likely due to chf exacerbation. +hypoglycemia, also likely sepsis given clinical picture. will initiate sepsis work up, probnp, trop

## 2019-10-08 DIAGNOSIS — R06.02 SHORTNESS OF BREATH: ICD-10-CM

## 2019-10-08 LAB
ALBUMIN SERPL ELPH-MCNC: 3.6 G/DL — SIGNIFICANT CHANGE UP (ref 3.3–5)
ALP SERPL-CCNC: 137 U/L — HIGH (ref 40–120)
ALT FLD-CCNC: 1664 U/L — HIGH (ref 10–45)
ALT FLD-CCNC: 2124 U/L — HIGH (ref 10–45)
ANION GAP SERPL CALC-SCNC: 18 MMOL/L — HIGH (ref 5–17)
ANION GAP SERPL CALC-SCNC: 19 MMOL/L — HIGH (ref 5–17)
ANION GAP SERPL CALC-SCNC: 23 MMOL/L — HIGH (ref 5–17)
ANION GAP SERPL CALC-SCNC: 27 MMOL/L — HIGH (ref 5–17)
APPEARANCE UR: CLEAR — SIGNIFICANT CHANGE UP
APTT BLD: 100.6 SEC — HIGH (ref 27.5–36.3)
APTT BLD: 40.3 SEC — HIGH (ref 27.5–36.3)
APTT BLD: 79.8 SEC — HIGH (ref 27.5–36.3)
AST SERPL-CCNC: 2967 U/L — HIGH (ref 10–40)
AST SERPL-CCNC: 3170 U/L — HIGH (ref 10–40)
BASE EXCESS BLDMV CALC-SCNC: -0.9 MMOL/L — SIGNIFICANT CHANGE UP (ref -3–3)
BASE EXCESS BLDMV CALC-SCNC: -2.4 MMOL/L — SIGNIFICANT CHANGE UP (ref -3–3)
BASE EXCESS BLDV CALC-SCNC: -2.5 MMOL/L — LOW (ref -2–2)
BASE EXCESS BLDV CALC-SCNC: -3.5 MMOL/L — LOW (ref -2–2)
BASE EXCESS BLDV CALC-SCNC: -8.7 MMOL/L — LOW (ref -2–2)
BASOPHILS # BLD AUTO: 0 K/UL — SIGNIFICANT CHANGE UP (ref 0–0.2)
BASOPHILS # BLD AUTO: 0.04 K/UL — SIGNIFICANT CHANGE UP (ref 0–0.2)
BASOPHILS # BLD AUTO: 0.05 K/UL — SIGNIFICANT CHANGE UP (ref 0–0.2)
BASOPHILS NFR BLD AUTO: 0 % — SIGNIFICANT CHANGE UP (ref 0–2)
BASOPHILS NFR BLD AUTO: 0.3 % — SIGNIFICANT CHANGE UP (ref 0–2)
BASOPHILS NFR BLD AUTO: 0.4 % — SIGNIFICANT CHANGE UP (ref 0–2)
BILIRUB SERPL-MCNC: 0.7 MG/DL — SIGNIFICANT CHANGE UP (ref 0.2–1.2)
BILIRUB UR-MCNC: NEGATIVE — SIGNIFICANT CHANGE UP
BLD GP AB SCN SERPL QL: NEGATIVE — SIGNIFICANT CHANGE UP
BUN SERPL-MCNC: 102 MG/DL — HIGH (ref 7–23)
BUN SERPL-MCNC: 82 MG/DL — HIGH (ref 7–23)
BUN SERPL-MCNC: 99 MG/DL — HIGH (ref 7–23)
CA-I SERPL-SCNC: 1.11 MMOL/L — LOW (ref 1.12–1.3)
CALCIUM SERPL-MCNC: 7.7 MG/DL — LOW (ref 8.4–10.5)
CALCIUM SERPL-MCNC: 8.1 MG/DL — LOW (ref 8.4–10.5)
CALCIUM SERPL-MCNC: 8.9 MG/DL — SIGNIFICANT CHANGE UP (ref 8.4–10.5)
CHLORIDE BLDV-SCNC: 101 MMOL/L — SIGNIFICANT CHANGE UP (ref 96–108)
CHLORIDE SERPL-SCNC: 100 MMOL/L — SIGNIFICANT CHANGE UP (ref 96–108)
CHLORIDE SERPL-SCNC: 101 MMOL/L — SIGNIFICANT CHANGE UP (ref 96–108)
CHLORIDE SERPL-SCNC: 102 MMOL/L — SIGNIFICANT CHANGE UP (ref 96–108)
CHLORIDE SERPL-SCNC: 98 MMOL/L — SIGNIFICANT CHANGE UP (ref 96–108)
CHOLEST SERPL-MCNC: 83 MG/DL — SIGNIFICANT CHANGE UP (ref 10–199)
CK MB BLD-MCNC: 7.2 % — HIGH (ref 0–3.5)
CK MB BLD-MCNC: 7.4 % — HIGH (ref 0–3.5)
CK MB CFR SERPL CALC: 11.1 NG/ML — HIGH (ref 0–3.8)
CK MB CFR SERPL CALC: 12.1 NG/ML — HIGH (ref 0–3.8)
CK SERPL-CCNC: 149 U/L — SIGNIFICANT CHANGE UP (ref 25–170)
CK SERPL-CCNC: 169 U/L — SIGNIFICANT CHANGE UP (ref 25–170)
CO2 BLDMV-SCNC: 23 MMOL/L — SIGNIFICANT CHANGE UP (ref 21–29)
CO2 BLDMV-SCNC: 24 MMOL/L — SIGNIFICANT CHANGE UP (ref 21–29)
CO2 BLDV-SCNC: 16 MMOL/L — LOW (ref 22–30)
CO2 BLDV-SCNC: 22 MMOL/L — SIGNIFICANT CHANGE UP (ref 22–30)
CO2 BLDV-SCNC: 23 MMOL/L — SIGNIFICANT CHANGE UP (ref 22–30)
CO2 SERPL-SCNC: 16 MMOL/L — LOW (ref 22–31)
CO2 SERPL-SCNC: 18 MMOL/L — LOW (ref 22–31)
CO2 SERPL-SCNC: 18 MMOL/L — LOW (ref 22–31)
CO2 SERPL-SCNC: 8 MMOL/L — CRITICAL LOW (ref 22–31)
COLOR SPEC: YELLOW — SIGNIFICANT CHANGE UP
CREAT ?TM UR-MCNC: 20 MG/DL — SIGNIFICANT CHANGE UP
CREAT SERPL-MCNC: 2.73 MG/DL — HIGH (ref 0.5–1.3)
CREAT SERPL-MCNC: 3.04 MG/DL — HIGH (ref 0.5–1.3)
CREAT SERPL-MCNC: 3.17 MG/DL — HIGH (ref 0.5–1.3)
DIFF PNL FLD: NEGATIVE — SIGNIFICANT CHANGE UP
EOSINOPHIL # BLD AUTO: 0.02 K/UL — SIGNIFICANT CHANGE UP (ref 0–0.5)
EOSINOPHIL # BLD AUTO: 0.11 K/UL — SIGNIFICANT CHANGE UP (ref 0–0.5)
EOSINOPHIL # BLD AUTO: 0.16 K/UL — SIGNIFICANT CHANGE UP (ref 0–0.5)
EOSINOPHIL NFR BLD AUTO: 0.2 % — SIGNIFICANT CHANGE UP (ref 0–6)
EOSINOPHIL NFR BLD AUTO: 0.9 % — SIGNIFICANT CHANGE UP (ref 0–6)
EOSINOPHIL NFR BLD AUTO: 1.2 % — SIGNIFICANT CHANGE UP (ref 0–6)
GAS PNL BLDA: SIGNIFICANT CHANGE UP
GAS PNL BLDMV: SIGNIFICANT CHANGE UP
GAS PNL BLDMV: SIGNIFICANT CHANGE UP
GAS PNL BLDV: 135 MMOL/L — SIGNIFICANT CHANGE UP (ref 135–145)
GAS PNL BLDV: SIGNIFICANT CHANGE UP
GLUCOSE BLDV-MCNC: 278 MG/DL — HIGH (ref 70–99)
GLUCOSE SERPL-MCNC: 223 MG/DL — HIGH (ref 70–99)
GLUCOSE SERPL-MCNC: 239 MG/DL — HIGH (ref 70–99)
GLUCOSE SERPL-MCNC: 264 MG/DL — HIGH (ref 70–99)
GLUCOSE UR QL: ABNORMAL
HBA1C BLD-MCNC: 7.5 % — HIGH (ref 4–5.6)
HCO3 BLDMV-SCNC: 22 MMOL/L — SIGNIFICANT CHANGE UP (ref 20–28)
HCO3 BLDMV-SCNC: 23 MMOL/L — SIGNIFICANT CHANGE UP (ref 20–28)
HCO3 BLDV-SCNC: 16 MMOL/L — LOW (ref 21–29)
HCO3 BLDV-SCNC: 21 MMOL/L — SIGNIFICANT CHANGE UP (ref 21–29)
HCO3 BLDV-SCNC: 22 MMOL/L — SIGNIFICANT CHANGE UP (ref 21–29)
HCT VFR BLD CALC: 24.8 % — LOW (ref 34.5–45)
HCT VFR BLD CALC: 26 % — LOW (ref 34.5–45)
HCT VFR BLD CALC: 26.4 % — LOW (ref 34.5–45)
HCT VFR BLDA CALC: 27 % — LOW (ref 39–50)
HDLC SERPL-MCNC: 14 MG/DL — LOW
HGB BLD CALC-MCNC: 8.8 G/DL — LOW (ref 11.5–15.5)
HGB BLD-MCNC: 7.7 G/DL — LOW (ref 11.5–15.5)
HGB BLD-MCNC: 8 G/DL — LOW (ref 11.5–15.5)
HGB BLD-MCNC: 8 G/DL — LOW (ref 11.5–15.5)
HOROWITZ INDEX BLDMV+IHG-RTO: 30 — SIGNIFICANT CHANGE UP
HOROWITZ INDEX BLDMV+IHG-RTO: 32 — SIGNIFICANT CHANGE UP
HOROWITZ INDEX BLDV+IHG-RTO: 30 — SIGNIFICANT CHANGE UP
HOROWITZ INDEX BLDV+IHG-RTO: 30 — SIGNIFICANT CHANGE UP
HOROWITZ INDEX BLDV+IHG-RTO: SIGNIFICANT CHANGE UP
IMM GRANULOCYTES NFR BLD AUTO: 1.1 % — SIGNIFICANT CHANGE UP (ref 0–1.5)
IMM GRANULOCYTES NFR BLD AUTO: 1.5 % — SIGNIFICANT CHANGE UP (ref 0–1.5)
INR BLD: 1.75 RATIO — HIGH (ref 0.88–1.16)
KETONES UR-MCNC: NEGATIVE — SIGNIFICANT CHANGE UP
LACTATE BLDV-MCNC: 5.8 MMOL/L — CRITICAL HIGH (ref 0.7–2)
LACTATE SERPL-SCNC: 2.2 MMOL/L — HIGH (ref 0.7–2)
LACTATE SERPL-SCNC: 3.7 MMOL/L — HIGH (ref 0.7–2)
LEUKOCYTE ESTERASE UR-ACNC: NEGATIVE — SIGNIFICANT CHANGE UP
LIPID PNL WITH DIRECT LDL SERPL: 46 MG/DL — SIGNIFICANT CHANGE UP
LYMPHOCYTES # BLD AUTO: 0.82 K/UL — LOW (ref 1–3.3)
LYMPHOCYTES # BLD AUTO: 1.11 K/UL — SIGNIFICANT CHANGE UP (ref 1–3.3)
LYMPHOCYTES # BLD AUTO: 1.51 K/UL — SIGNIFICANT CHANGE UP (ref 1–3.3)
LYMPHOCYTES # BLD AUTO: 12.3 % — LOW (ref 13–44)
LYMPHOCYTES # BLD AUTO: 6.3 % — LOW (ref 13–44)
LYMPHOCYTES # BLD AUTO: 8.5 % — LOW (ref 13–44)
MAGNESIUM SERPL-MCNC: 2.6 MG/DL — SIGNIFICANT CHANGE UP (ref 1.6–2.6)
MCHC RBC-ENTMCNC: 26.1 PG — LOW (ref 27–34)
MCHC RBC-ENTMCNC: 26.2 PG — LOW (ref 27–34)
MCHC RBC-ENTMCNC: 26.3 PG — LOW (ref 27–34)
MCHC RBC-ENTMCNC: 30.3 GM/DL — LOW (ref 32–36)
MCHC RBC-ENTMCNC: 30.8 GM/DL — LOW (ref 32–36)
MCHC RBC-ENTMCNC: 31 GM/DL — LOW (ref 32–36)
MCV RBC AUTO: 84.6 FL — SIGNIFICANT CHANGE UP (ref 80–100)
MCV RBC AUTO: 85.2 FL — SIGNIFICANT CHANGE UP (ref 80–100)
MCV RBC AUTO: 86.3 FL — SIGNIFICANT CHANGE UP (ref 80–100)
MONOCYTES # BLD AUTO: 0.58 K/UL — SIGNIFICANT CHANGE UP (ref 0–0.9)
MONOCYTES # BLD AUTO: 0.67 K/UL — SIGNIFICANT CHANGE UP (ref 0–0.9)
MONOCYTES # BLD AUTO: 1.94 K/UL — HIGH (ref 0–0.9)
MONOCYTES NFR BLD AUTO: 15.8 % — HIGH (ref 2–14)
MONOCYTES NFR BLD AUTO: 4.5 % — SIGNIFICANT CHANGE UP (ref 2–14)
MONOCYTES NFR BLD AUTO: 5.1 % — SIGNIFICANT CHANGE UP (ref 2–14)
NEUTROPHILS # BLD AUTO: 11.04 K/UL — HIGH (ref 1.8–7.4)
NEUTROPHILS # BLD AUTO: 11.24 K/UL — HIGH (ref 1.8–7.4)
NEUTROPHILS # BLD AUTO: 8.5 K/UL — HIGH (ref 1.8–7.4)
NEUTROPHILS NFR BLD AUTO: 67.5 % — SIGNIFICANT CHANGE UP (ref 43–77)
NEUTROPHILS NFR BLD AUTO: 85 % — HIGH (ref 43–77)
NEUTROPHILS NFR BLD AUTO: 85.9 % — HIGH (ref 43–77)
NITRITE UR-MCNC: NEGATIVE — SIGNIFICANT CHANGE UP
NRBC # BLD: 0 /100 WBCS — SIGNIFICANT CHANGE UP (ref 0–0)
NT-PROBNP SERPL-SCNC: HIGH PG/ML (ref 0–300)
O2 CT VFR BLD CALC: 39 MMHG — SIGNIFICANT CHANGE UP (ref 30–65)
O2 CT VFR BLD CALC: 40 MMHG — SIGNIFICANT CHANGE UP (ref 30–65)
OSMOLALITY UR: 318 MOS/KG — SIGNIFICANT CHANGE UP (ref 300–900)
PCO2 BLDMV: 36 MMHG — SIGNIFICANT CHANGE UP (ref 30–65)
PCO2 BLDMV: 39 MMHG — SIGNIFICANT CHANGE UP (ref 30–65)
PCO2 BLDV: 30 MMHG — LOW (ref 35–50)
PCO2 BLDV: 37 MMHG — SIGNIFICANT CHANGE UP (ref 35–50)
PCO2 BLDV: 39 MMHG — SIGNIFICANT CHANGE UP (ref 35–50)
PH BLDMV: 7.37 — SIGNIFICANT CHANGE UP (ref 7.32–7.45)
PH BLDMV: 7.42 — SIGNIFICANT CHANGE UP (ref 7.32–7.45)
PH BLDV: 7.34 — LOW (ref 7.35–7.45)
PH BLDV: 7.37 — SIGNIFICANT CHANGE UP (ref 7.35–7.45)
PH BLDV: 7.37 — SIGNIFICANT CHANGE UP (ref 7.35–7.45)
PH UR: 5.5 — SIGNIFICANT CHANGE UP (ref 5–8)
PHOSPHATE SERPL-MCNC: 5.5 MG/DL — HIGH (ref 2.5–4.5)
PHOSPHATE SERPL-MCNC: 7.9 MG/DL — HIGH (ref 2.5–4.5)
PLATELET # BLD AUTO: 345 K/UL — SIGNIFICANT CHANGE UP (ref 150–400)
PLATELET # BLD AUTO: 370 K/UL — SIGNIFICANT CHANGE UP (ref 150–400)
PLATELET # BLD AUTO: 378 K/UL — SIGNIFICANT CHANGE UP (ref 150–400)
PO2 BLDV: 38 MMHG — SIGNIFICANT CHANGE UP (ref 25–45)
PO2 BLDV: 39 MMHG — SIGNIFICANT CHANGE UP (ref 25–45)
PO2 BLDV: 47 MMHG — HIGH (ref 25–45)
POTASSIUM BLDV-SCNC: 4.4 MMOL/L — SIGNIFICANT CHANGE UP (ref 3.5–5.3)
POTASSIUM SERPL-MCNC: 3.7 MMOL/L — SIGNIFICANT CHANGE UP (ref 3.5–5.3)
POTASSIUM SERPL-MCNC: 3.7 MMOL/L — SIGNIFICANT CHANGE UP (ref 3.5–5.3)
POTASSIUM SERPL-MCNC: 4.2 MMOL/L — SIGNIFICANT CHANGE UP (ref 3.5–5.3)
POTASSIUM SERPL-MCNC: 5.4 MMOL/L — HIGH (ref 3.5–5.3)
POTASSIUM SERPL-SCNC: 3.7 MMOL/L — SIGNIFICANT CHANGE UP (ref 3.5–5.3)
POTASSIUM SERPL-SCNC: 3.7 MMOL/L — SIGNIFICANT CHANGE UP (ref 3.5–5.3)
POTASSIUM SERPL-SCNC: 4.2 MMOL/L — SIGNIFICANT CHANGE UP (ref 3.5–5.3)
POTASSIUM SERPL-SCNC: 5.4 MMOL/L — HIGH (ref 3.5–5.3)
PROCALCITONIN SERPL-MCNC: 0.68 NG/ML — HIGH (ref 0.02–0.1)
PROT SERPL-MCNC: 7.2 G/DL — SIGNIFICANT CHANGE UP (ref 6–8.3)
PROT UR-MCNC: ABNORMAL
PROTHROM AB SERPL-ACNC: 20.3 SEC — HIGH (ref 10–12.9)
RBC # BLD: 2.93 M/UL — LOW (ref 3.8–5.2)
RBC # BLD: 3.05 M/UL — LOW (ref 3.8–5.2)
RBC # BLD: 3.06 M/UL — LOW (ref 3.8–5.2)
RBC # FLD: 18.6 % — HIGH (ref 10.3–14.5)
RBC # FLD: 18.7 % — HIGH (ref 10.3–14.5)
RBC # FLD: 18.8 % — HIGH (ref 10.3–14.5)
RH IG SCN BLD-IMP: POSITIVE — SIGNIFICANT CHANGE UP
SAO2 % BLDMV: 59 % — LOW (ref 60–90)
SAO2 % BLDMV: 64 % — SIGNIFICANT CHANGE UP (ref 60–90)
SAO2 % BLDV: 56 % — LOW (ref 67–88)
SAO2 % BLDV: 56 % — LOW (ref 67–88)
SAO2 % BLDV: 70 % — SIGNIFICANT CHANGE UP (ref 67–88)
SODIUM SERPL-SCNC: 136 MMOL/L — SIGNIFICANT CHANGE UP (ref 135–145)
SODIUM SERPL-SCNC: 137 MMOL/L — SIGNIFICANT CHANGE UP (ref 135–145)
SODIUM SERPL-SCNC: 137 MMOL/L — SIGNIFICANT CHANGE UP (ref 135–145)
SODIUM SERPL-SCNC: 138 MMOL/L — SIGNIFICANT CHANGE UP (ref 135–145)
SODIUM UR-SCNC: 67 MMOL/L — SIGNIFICANT CHANGE UP
SP GR SPEC: 1.02 — SIGNIFICANT CHANGE UP (ref 1.01–1.02)
TOTAL CHOLESTEROL/HDL RATIO MEASUREMENT: 5.9 RATIO — SIGNIFICANT CHANGE UP (ref 3.3–7.1)
TRIGL SERPL-MCNC: 117 MG/DL — SIGNIFICANT CHANGE UP (ref 10–149)
TROPONIN T, HIGH SENSITIVITY RESULT: 1493 NG/L — HIGH (ref 0–51)
TROPONIN T, HIGH SENSITIVITY RESULT: 1731 NG/L — HIGH (ref 0–51)
TROPONIN T, HIGH SENSITIVITY RESULT: 1838 NG/L — HIGH (ref 0–51)
TSH SERPL-MCNC: 1.01 UIU/ML — SIGNIFICANT CHANGE UP (ref 0.27–4.2)
UROBILINOGEN FLD QL: NEGATIVE — SIGNIFICANT CHANGE UP
WBC # BLD: 11.96 K/UL — HIGH (ref 3.8–10.5)
WBC # BLD: 12.99 K/UL — HIGH (ref 3.8–10.5)
WBC # BLD: 13.08 K/UL — HIGH (ref 3.8–10.5)
WBC # FLD AUTO: 11.96 K/UL — HIGH (ref 3.8–10.5)
WBC # FLD AUTO: 12.99 K/UL — HIGH (ref 3.8–10.5)
WBC # FLD AUTO: 13.08 K/UL — HIGH (ref 3.8–10.5)

## 2019-10-08 PROCEDURE — 99291 CRITICAL CARE FIRST HOUR: CPT

## 2019-10-08 PROCEDURE — 71045 X-RAY EXAM CHEST 1 VIEW: CPT | Mod: 26

## 2019-10-08 PROCEDURE — 93306 TTE W/DOPPLER COMPLETE: CPT | Mod: 26

## 2019-10-08 PROCEDURE — 33967 INSERT I-AORT PERCUT DEVICE: CPT | Mod: GC

## 2019-10-08 PROCEDURE — 93451 RIGHT HEART CATH: CPT | Mod: 26,GC

## 2019-10-08 PROCEDURE — 93010 ELECTROCARDIOGRAM REPORT: CPT

## 2019-10-08 RX ORDER — ASPIRIN/CALCIUM CARB/MAGNESIUM 324 MG
81 TABLET ORAL DAILY
Refills: 0 | Status: DISCONTINUED | OUTPATIENT
Start: 2019-10-08 | End: 2019-10-18

## 2019-10-08 RX ORDER — BUMETANIDE 0.25 MG/ML
4 INJECTION INTRAMUSCULAR; INTRAVENOUS ONCE
Refills: 0 | Status: COMPLETED | OUTPATIENT
Start: 2019-10-08 | End: 2019-10-08

## 2019-10-08 RX ORDER — SENNA PLUS 8.6 MG/1
2 TABLET ORAL AT BEDTIME
Refills: 0 | Status: DISCONTINUED | OUTPATIENT
Start: 2019-10-08 | End: 2019-10-18

## 2019-10-08 RX ORDER — DEXTROSE 50 % IN WATER 50 %
12.5 SYRINGE (ML) INTRAVENOUS ONCE
Refills: 0 | Status: DISCONTINUED | OUTPATIENT
Start: 2019-10-08 | End: 2019-10-18

## 2019-10-08 RX ORDER — DOCUSATE SODIUM 100 MG
100 CAPSULE ORAL
Refills: 0 | Status: DISCONTINUED | OUTPATIENT
Start: 2019-10-08 | End: 2019-10-18

## 2019-10-08 RX ORDER — HEPARIN SODIUM 5000 [USP'U]/ML
3200 INJECTION INTRAVENOUS; SUBCUTANEOUS EVERY 6 HOURS
Refills: 0 | Status: DISCONTINUED | OUTPATIENT
Start: 2019-10-08 | End: 2019-10-11

## 2019-10-08 RX ORDER — DEXTROSE 50 % IN WATER 50 %
25 SYRINGE (ML) INTRAVENOUS ONCE
Refills: 0 | Status: DISCONTINUED | OUTPATIENT
Start: 2019-10-08 | End: 2019-10-18

## 2019-10-08 RX ORDER — DOCUSATE SODIUM 100 MG
100 CAPSULE ORAL
Refills: 0 | Status: DISCONTINUED | OUTPATIENT
Start: 2019-10-08 | End: 2019-10-08

## 2019-10-08 RX ORDER — SODIUM BICARBONATE 1 MEQ/ML
650 SYRINGE (ML) INTRAVENOUS
Refills: 0 | Status: DISCONTINUED | OUTPATIENT
Start: 2019-10-08 | End: 2019-10-09

## 2019-10-08 RX ORDER — VANCOMYCIN HCL 1 G
750 VIAL (EA) INTRAVENOUS EVERY 12 HOURS
Refills: 0 | Status: DISCONTINUED | OUTPATIENT
Start: 2019-10-08 | End: 2019-10-09

## 2019-10-08 RX ORDER — LEVOTHYROXINE SODIUM 125 MCG
50 TABLET ORAL DAILY
Refills: 0 | Status: DISCONTINUED | OUTPATIENT
Start: 2019-10-08 | End: 2019-10-18

## 2019-10-08 RX ORDER — PANTOPRAZOLE SODIUM 20 MG/1
40 TABLET, DELAYED RELEASE ORAL
Refills: 0 | Status: DISCONTINUED | OUTPATIENT
Start: 2019-10-08 | End: 2019-10-18

## 2019-10-08 RX ORDER — MORPHINE SULFATE 50 MG/1
1 CAPSULE, EXTENDED RELEASE ORAL ONCE
Refills: 0 | Status: DISCONTINUED | OUTPATIENT
Start: 2019-10-08 | End: 2019-10-08

## 2019-10-08 RX ORDER — HYDRALAZINE HCL 50 MG
25 TABLET ORAL THREE TIMES A DAY
Refills: 0 | Status: DISCONTINUED | OUTPATIENT
Start: 2019-10-08 | End: 2019-10-09

## 2019-10-08 RX ORDER — CLOPIDOGREL BISULFATE 75 MG/1
75 TABLET, FILM COATED ORAL DAILY
Refills: 0 | Status: DISCONTINUED | OUTPATIENT
Start: 2019-10-08 | End: 2019-10-18

## 2019-10-08 RX ORDER — BUMETANIDE 0.25 MG/ML
2 INJECTION INTRAMUSCULAR; INTRAVENOUS
Qty: 20 | Refills: 0 | Status: DISCONTINUED | OUTPATIENT
Start: 2019-10-08 | End: 2019-10-09

## 2019-10-08 RX ORDER — HEPARIN SODIUM 5000 [USP'U]/ML
5000 INJECTION INTRAVENOUS; SUBCUTANEOUS EVERY 12 HOURS
Refills: 0 | Status: DISCONTINUED | OUTPATIENT
Start: 2019-10-08 | End: 2019-10-08

## 2019-10-08 RX ORDER — NITROGLYCERIN 6.5 MG
0.4 CAPSULE, EXTENDED RELEASE ORAL ONCE
Refills: 0 | Status: COMPLETED | OUTPATIENT
Start: 2019-10-08 | End: 2019-10-08

## 2019-10-08 RX ORDER — GLUCAGON INJECTION, SOLUTION 0.5 MG/.1ML
1 INJECTION, SOLUTION SUBCUTANEOUS ONCE
Refills: 0 | Status: DISCONTINUED | OUTPATIENT
Start: 2019-10-08 | End: 2019-10-18

## 2019-10-08 RX ORDER — INSULIN LISPRO 100/ML
VIAL (ML) SUBCUTANEOUS
Refills: 0 | Status: DISCONTINUED | OUTPATIENT
Start: 2019-10-08 | End: 2019-10-12

## 2019-10-08 RX ORDER — SODIUM CHLORIDE 9 MG/ML
1000 INJECTION, SOLUTION INTRAVENOUS
Refills: 0 | Status: DISCONTINUED | OUTPATIENT
Start: 2019-10-08 | End: 2019-10-18

## 2019-10-08 RX ORDER — INSULIN HUMAN 100 [IU]/ML
3 INJECTION, SOLUTION SUBCUTANEOUS
Qty: 100 | Refills: 0 | Status: DISCONTINUED | OUTPATIENT
Start: 2019-10-08 | End: 2019-10-08

## 2019-10-08 RX ORDER — POTASSIUM CHLORIDE 20 MEQ
20 PACKET (EA) ORAL ONCE
Refills: 0 | Status: COMPLETED | OUTPATIENT
Start: 2019-10-08 | End: 2019-10-08

## 2019-10-08 RX ORDER — POTASSIUM CHLORIDE 20 MEQ
20 PACKET (EA) ORAL ONCE
Refills: 0 | Status: DISCONTINUED | OUTPATIENT
Start: 2019-10-08 | End: 2019-10-08

## 2019-10-08 RX ORDER — VANCOMYCIN HCL 1 G
750 VIAL (EA) INTRAVENOUS ONCE
Refills: 0 | Status: COMPLETED | OUTPATIENT
Start: 2019-10-08 | End: 2019-10-08

## 2019-10-08 RX ORDER — VANCOMYCIN HCL 1 G
VIAL (EA) INTRAVENOUS
Refills: 0 | Status: DISCONTINUED | OUTPATIENT
Start: 2019-10-08 | End: 2019-10-09

## 2019-10-08 RX ORDER — PIPERACILLIN AND TAZOBACTAM 4; .5 G/20ML; G/20ML
3.38 INJECTION, POWDER, LYOPHILIZED, FOR SOLUTION INTRAVENOUS EVERY 12 HOURS
Refills: 0 | Status: DISCONTINUED | OUTPATIENT
Start: 2019-10-08 | End: 2019-10-09

## 2019-10-08 RX ORDER — DEXTROSE 50 % IN WATER 50 %
15 SYRINGE (ML) INTRAVENOUS ONCE
Refills: 0 | Status: DISCONTINUED | OUTPATIENT
Start: 2019-10-08 | End: 2019-10-18

## 2019-10-08 RX ORDER — POLYETHYLENE GLYCOL 3350 17 G/17G
17 POWDER, FOR SOLUTION ORAL DAILY
Refills: 0 | Status: DISCONTINUED | OUTPATIENT
Start: 2019-10-08 | End: 2019-10-18

## 2019-10-08 RX ORDER — PIPERACILLIN AND TAZOBACTAM 4; .5 G/20ML; G/20ML
3.38 INJECTION, POWDER, LYOPHILIZED, FOR SOLUTION INTRAVENOUS ONCE
Refills: 0 | Status: COMPLETED | OUTPATIENT
Start: 2019-10-08 | End: 2019-10-08

## 2019-10-08 RX ORDER — HEPARIN SODIUM 5000 [USP'U]/ML
INJECTION INTRAVENOUS; SUBCUTANEOUS
Qty: 25000 | Refills: 0 | Status: DISCONTINUED | OUTPATIENT
Start: 2019-10-08 | End: 2019-10-11

## 2019-10-08 RX ORDER — CHLORHEXIDINE GLUCONATE 213 G/1000ML
1 SOLUTION TOPICAL
Refills: 0 | Status: DISCONTINUED | OUTPATIENT
Start: 2019-10-08 | End: 2019-10-18

## 2019-10-08 RX ORDER — ALPRAZOLAM 0.25 MG
0.25 TABLET ORAL THREE TIMES A DAY
Refills: 0 | Status: DISCONTINUED | OUTPATIENT
Start: 2019-10-08 | End: 2019-10-15

## 2019-10-08 RX ADMIN — CLOPIDOGREL BISULFATE 75 MILLIGRAM(S): 75 TABLET, FILM COATED ORAL at 17:46

## 2019-10-08 RX ADMIN — MORPHINE SULFATE 1 MILLIGRAM(S): 50 CAPSULE, EXTENDED RELEASE ORAL at 23:23

## 2019-10-08 RX ADMIN — Medication 0.4 MILLIGRAM(S): at 21:34

## 2019-10-08 RX ADMIN — BUMETANIDE 132 MILLIGRAM(S): 0.25 INJECTION INTRAMUSCULAR; INTRAVENOUS at 05:38

## 2019-10-08 RX ADMIN — Medication 25 MILLIGRAM(S): at 18:47

## 2019-10-08 RX ADMIN — Medication 250 MILLIGRAM(S): at 10:10

## 2019-10-08 RX ADMIN — HEPARIN SODIUM 600 UNIT(S)/HR: 5000 INJECTION INTRAVENOUS; SUBCUTANEOUS at 17:51

## 2019-10-08 RX ADMIN — CHLORHEXIDINE GLUCONATE 1 APPLICATION(S): 213 SOLUTION TOPICAL at 07:11

## 2019-10-08 RX ADMIN — Medication 650 MILLIGRAM(S): at 21:34

## 2019-10-08 RX ADMIN — Medication 250 MILLIGRAM(S): at 22:45

## 2019-10-08 RX ADMIN — BUMETANIDE 10 MG/HR: 0.25 INJECTION INTRAMUSCULAR; INTRAVENOUS at 06:25

## 2019-10-08 RX ADMIN — HEPARIN SODIUM 650 UNIT(S)/HR: 5000 INJECTION INTRAVENOUS; SUBCUTANEOUS at 10:14

## 2019-10-08 RX ADMIN — PIPERACILLIN AND TAZOBACTAM 200 GRAM(S): 4; .5 INJECTION, POWDER, LYOPHILIZED, FOR SOLUTION INTRAVENOUS at 12:30

## 2019-10-08 RX ADMIN — Medication 81 MILLIGRAM(S): at 17:45

## 2019-10-08 RX ADMIN — Medication 100 MILLIEQUIVALENT(S): at 18:38

## 2019-10-08 RX ADMIN — Medication 4: at 17:49

## 2019-10-08 RX ADMIN — SENNA PLUS 2 TABLET(S): 8.6 TABLET ORAL at 21:35

## 2019-10-08 RX ADMIN — BUMETANIDE 10 MG/HR: 0.25 INJECTION INTRAMUSCULAR; INTRAVENOUS at 21:35

## 2019-10-08 NOTE — PROGRESS NOTE ADULT - SUBJECTIVE AND OBJECTIVE BOX
PATIENT:  SELVIN CLAIRE  [id number]    CHIEF COMPLAINT:  Patient is a 73y old  Female who presents with a chief complaint of Acute decompensated heart failure, SOB (08 Oct 2019 03:04)      INTERVAL HISTORY/OVERNIGHT EVENTS: Admitted overnight for acute decompensated heart failure. Pt seen and examined this morning.       REVIEW OF SYSTEMS:    Constitutional:     [ ] negative [ ] fevers [ ] chills [ ] weight loss [ ] weight gain  HEENT:                  [ ] negative [ ] dry eyes [ ] eye irritation [ ] postnasal drip [ ] nasal congestion  CV:                         [ ] negative  [ ] chest pain [ ] orthopnea [ ] palpitations [ ] murmur  Resp:                     [ ] negative [ ] cough [ ] shortness of breath [ ] dyspnea [ ] wheezing [ ] sputum [ ] hemoptysis  GI:                          [ ] negative [ ] nausea [ ] vomiting [ ] diarrhea [ ] constipation [ ] abd pain [ ] dysphagia   :                        [ ] negative [ ] dysuria [ ] nocturia [ ] hematuria [ ] increased urinary frequency  Musculoskeletal: [ ] negative [ ] back pain [ ] myalgias [ ] arthralgias [ ] fracture  Skin:                       [ ] negative [ ] rash [ ] itch  Neurological:        [ ] negative [ ] headache [ ] dizziness [ ] syncope [ ] weakness [ ] numbness  Psychiatric:           [ ] negative [ ] anxiety [ ] depression  Endocrine:            [ ] negative [ ] diabetes [ ] thyroid problem  Heme/Lymph:      [ ] negative [ ] anemia [ ] bleeding problem  Allergic/Immune: [ ] negative [ ] itchy eyes [ ] nasal discharge [ ] hives [ ] angioedema    [ ] All other systems negative  [ ] Unable to assess ROS because ________.    MEDICATIONS:  MEDICATIONS  (STANDING):  aspirin enteric coated 81 milliGRAM(s) Oral daily  buMETAnide Infusion 2 mG/Hr (10 mL/Hr) IV Continuous <Continuous>  chlorhexidine 2% Cloths 1 Application(s) Topical <User Schedule>  clopidogrel Tablet 75 milliGRAM(s) Oral daily  docusate sodium 100 milliGRAM(s) Oral two times a day  heparin  Injectable 5000 Unit(s) SubCutaneous every 12 hours  levothyroxine 50 MICROGram(s) Oral daily  pantoprazole    Tablet 40 milliGRAM(s) Oral before breakfast  polyethylene glycol 3350 17 Gram(s) Oral daily  senna 2 Tablet(s) Oral at bedtime  sodium bicarbonate 650 milliGRAM(s) Oral two times a day    MEDICATIONS  (PRN):  ALPRAZolam 0.25 milliGRAM(s) Oral three times a day PRN Anxiety      ALLERGIES:  Allergies    azithromycin (Hives; Pruritus)    Intolerances        OBJECTIVE:  ICU Vital Signs Last 24 Hrs  T(C): 36.7 (08 Oct 2019 06:53), Max: 36.7 (07 Oct 2019 22:01)  T(F): 98 (08 Oct 2019 06:53), Max: 98 (07 Oct 2019 22:01)  HR: 78 (08 Oct 2019 06:57) (54 - 82)  BP: 97/61 (08 Oct 2019 06:53) (86/54 - 124/75)  BP(mean): 86 (08 Oct 2019 06:53) (86 - 86)  ABP: --  ABP(mean): --  RR: 20 (08 Oct 2019 06:53) (20 - 48)  SpO2: 100% (08 Oct 2019 06:57) (98% - 100%)      Adult Advanced Hemodynamics Last 24 Hrs  CVP(mm Hg): --  CVP(cm H2O): --  CO: --  CI: --  PA: --  PA(mean): --  PCWP: --  SVR: --  SVRI: --  PVR: --  PVRI: --  CAPILLARY BLOOD GLUCOSE      POCT Blood Glucose.: 284 mg/dL (08 Oct 2019 06:21)  POCT Blood Glucose.: 296 mg/dL (08 Oct 2019 04:43)  POCT Blood Glucose.: 244 mg/dL (07 Oct 2019 23:49)  POCT Blood Glucose.: 62 mg/dL (07 Oct 2019 22:39)    CAPILLARY BLOOD GLUCOSE      POCT Blood Glucose.: 284 mg/dL (08 Oct 2019 06:21)    I&O's Summary    07 Oct 2019 07:01  -  08 Oct 2019 07:00  --------------------------------------------------------  IN: 10 mL / OUT: 150 mL / NET: -140 mL      Daily Height in cm: 149.86 (07 Oct 2019 21:25)    Daily     PHYSICAL EXAMINATION:  General: WN/WD NAD  HEENT: PERRLA, EOMI, moist mucous membranes  Neurology: A&Ox3, nonfocal, CUADRA x 4  Respiratory: CTA B/L, normal respiratory effort, no wheezes, crackles, rales  CV: RRR, S1S2, no murmurs, rubs or gallops  Abdominal: Soft, NT, ND +BS, Last BM  Extremities: No edema, + peripheral pulses  Incisions:   Tubes:    LABS:  ABG - ( 07 Oct 2019 23:39 )  pH, Arterial: 7.29  pH, Blood: x     /  pCO2: 18    /  pO2: 150   / HCO3: 8     / Base Excess: -16.6 /  SaO2: 98                                      10.4   12.28 )-----------( 532      ( 07 Oct 2019 22:28 )             37.3     10-08    135  |  95<L>  |  102<H>  ----------------------------<  297<H>  4.8   |  13<L>  |  3.26<H>    Ca    9.1      08 Oct 2019 05:42    TPro  7.3  /  Alb  3.4  /  TBili  0.8  /  DBili  x   /  AST  2125<H>  /  ALT  1298<H>  /  AlkPhos  139<H>  10-08    LIVER FUNCTIONS - ( 08 Oct 2019 05:42 )  Alb: 3.4 g/dL / Pro: 7.3 g/dL / ALK PHOS: 139 U/L / ALT: 1298 U/L / AST: 2125 U/L / GGT: x           PT/INR - ( 07 Oct 2019 22:28 )   PT: 17.1 sec;   INR: 1.48 ratio         PTT - ( 07 Oct 2019 22:28 )  PTT:35.0 sec        Urinalysis Basic - ( 08 Oct 2019 02:35 )    Color: Yellow / Appearance: Clear / S.016 / pH: x  Gluc: x / Ketone: Negative  / Bili: Negative / Urobili: Negative   Blood: x / Protein: 30 mg/dL / Nitrite: Negative   Leuk Esterase: Negative / RBC: 1 /hpf / WBC 0 /HPF   Sq Epi: x / Non Sq Epi: 2 /hpf / Bacteria: Negative        TELEMETRY:     EKG:     IMAGING: PATIENT:  SELVIN CLAIRE  [id number]    CHIEF COMPLAINT:  Patient is a 73y old  Female who presents with a chief complaint of Acute decompensated heart failure, SOB (08 Oct 2019 03:04)      INTERVAL HISTORY/OVERNIGHT EVENTS: Admitted overnight for acute decompensated heart failure. Pt seen and examined this morning. Unable to interview patient due to Bipap. ROS not obtained.      REVIEW OF SYSTEMS:    Constitutional:     [ ] negative [ ] fevers [ ] chills [ ] weight loss [ ] weight gain  HEENT:                  [ ] negative [ ] dry eyes [ ] eye irritation [ ] postnasal drip [ ] nasal congestion  CV:                         [ ] negative  [ ] chest pain [ ] orthopnea [ ] palpitations [ ] murmur  Resp:                     [ ] negative [ ] cough [ ] shortness of breath [ ] dyspnea [ ] wheezing [ ] sputum [ ] hemoptysis  GI:                          [ ] negative [ ] nausea [ ] vomiting [ ] diarrhea [ ] constipation [ ] abd pain [ ] dysphagia   :                        [ ] negative [ ] dysuria [ ] nocturia [ ] hematuria [ ] increased urinary frequency  Musculoskeletal: [ ] negative [ ] back pain [ ] myalgias [ ] arthralgias [ ] fracture  Skin:                       [ ] negative [ ] rash [ ] itch  Neurological:        [ ] negative [ ] headache [ ] dizziness [ ] syncope [ ] weakness [ ] numbness  Psychiatric:           [ ] negative [ ] anxiety [ ] depression  Endocrine:            [ ] negative [ ] diabetes [ ] thyroid problem  Heme/Lymph:      [ ] negative [ ] anemia [ ] bleeding problem  Allergic/Immune: [ ] negative [ ] itchy eyes [ ] nasal discharge [ ] hives [ ] angioedema    [ ] All other systems negative  [ ] Unable to assess ROS because ___on BIPAP_____.    MEDICATIONS:  MEDICATIONS  (STANDING):  aspirin enteric coated 81 milliGRAM(s) Oral daily  buMETAnide Infusion 2 mG/Hr (10 mL/Hr) IV Continuous <Continuous>  chlorhexidine 2% Cloths 1 Application(s) Topical <User Schedule>  clopidogrel Tablet 75 milliGRAM(s) Oral daily  docusate sodium 100 milliGRAM(s) Oral two times a day  heparin  Injectable 5000 Unit(s) SubCutaneous every 12 hours  levothyroxine 50 MICROGram(s) Oral daily  pantoprazole    Tablet 40 milliGRAM(s) Oral before breakfast  polyethylene glycol 3350 17 Gram(s) Oral daily  senna 2 Tablet(s) Oral at bedtime  sodium bicarbonate 650 milliGRAM(s) Oral two times a day    MEDICATIONS  (PRN):  ALPRAZolam 0.25 milliGRAM(s) Oral three times a day PRN Anxiety      ALLERGIES:  Allergies    azithromycin (Hives; Pruritus)    Intolerances        OBJECTIVE:  ICU Vital Signs Last 24 Hrs  T(C): 36.7 (08 Oct 2019 06:53), Max: 36.7 (07 Oct 2019 22:01)  T(F): 98 (08 Oct 2019 06:53), Max: 98 (07 Oct 2019 22:01)  HR: 78 (08 Oct 2019 06:57) (54 - 82)  BP: 97/61 (08 Oct 2019 06:53) (86/54 - 124/75)  BP(mean): 86 (08 Oct 2019 06:53) (86 - 86)  ABP: --  ABP(mean): --  RR: 20 (08 Oct 2019 06:53) (20 - 48)  SpO2: 100% (08 Oct 2019 06:57) (98% - 100%)      POCT Blood Glucose.: 284 mg/dL (08 Oct 2019 06:21)  POCT Blood Glucose.: 296 mg/dL (08 Oct 2019 04:43)  POCT Blood Glucose.: 244 mg/dL (07 Oct 2019 23:49)  POCT Blood Glucose.: 62 mg/dL (07 Oct 2019 22:39)    CAPILLARY BLOOD GLUCOSE      POCT Blood Glucose.: 284 mg/dL (08 Oct 2019 06:21)    I&O's Summary    07 Oct 2019 07:01  -  08 Oct 2019 07:00  --------------------------------------------------------  IN: 10 mL / OUT: 150 mL / NET: -140 mL      Daily Height in cm: 149.86 (07 Oct 2019 21:25)    Daily     PHYSICAL EXAMINATION:  General: ill-appearing elderly woman lying in bed on BIPAP   HEENT: PERRLA, EOMI, moist mucous membranes  Neurology: unable to assess  Respiratory: bibasilar crackles   CV: RRR, S1S2, no murmurs, rubs or gallops  Abdominal: Soft, mildly distended, nontender   Extremities: trace edema to bilateral lower shins, + peripheral pulses  Tubes: +Oscar     LABS:  ABG - ( 07 Oct 2019 23:39 )  pH, Arterial: 7.29  pH, Blood: x     /  pCO2: 18    /  pO2: 150   / HCO3: 8     / Base Excess: -16.6 /  SaO2: 98                                      10.4   12.28 )-----------( 532      ( 07 Oct 2019 22:28 )             37.3     10-08    135  |  95<L>  |  102<H>  ----------------------------<  297<H>  4.8   |  13<L>  |  3.26<H>    Ca    9.1      08 Oct 2019 05:42    TPro  7.3  /  Alb  3.4  /  TBili  0.8  /  DBili  x   /  AST  2125<H>  /  ALT  1298<H>  /  AlkPhos  139<H>  10-08    LIVER FUNCTIONS - ( 08 Oct 2019 05:42 )  Alb: 3.4 g/dL / Pro: 7.3 g/dL / ALK PHOS: 139 U/L / ALT: 1298 U/L / AST: 2125 U/L / GGT: x           PT/INR - ( 07 Oct 2019 22:28 )   PT: 17.1 sec;   INR: 1.48 ratio         PTT - ( 07 Oct 2019 22:28 )  PTT:35.0 sec        Urinalysis Basic - ( 08 Oct 2019 02:35 )    Color: Yellow / Appearance: Clear / S.016 / pH: x  Gluc: x / Ketone: Negative  / Bili: Negative / Urobili: Negative   Blood: x / Protein: 30 mg/dL / Nitrite: Negative   Leuk Esterase: Negative / RBC: 1 /hpf / WBC 0 /HPF   Sq Epi: x / Non Sq Epi: 2 /hpf / Bacteria: Negative        TELEMETRY:     EKG:     IMAGING:

## 2019-10-08 NOTE — H&P ADULT - ASSESSMENT
73 YOF with PMHx of HTN, HLD, T2DM, GERD, CAD s/p CABG (LIMA to LAD, SVG to OM, SVG to PDA 2014 at [facility]), severe mitral regurgitation s/p mitral clip, severe pulmonary HTN, CKD IV, hypothyroidism presenting with SOB likely 2/2 ADHF    #Neuro  - NAIs, mentating well, A&Ox3    #CV  - BNP 16, 667, trop T 1842, grossly overloaded on exam  - monitor I&Os, daily weights  - [place]    #Pulm  - currently on bipap 10/5, lactate 10.7, abg c/w HAGMA    # GI  - NAIs, c/w protonix    #Heme/onc  - H/H appears to be at baseline, 10.4  - keep active 73 YOF with PMHx of HTN, HLD, T2DM, GERD, CAD s/p CABG (LIMA to LAD, SVG to OM, SVG to PDA 2014 at [facility]), severe mitral regurgitation s/p mitral clip, severe pulmonary HTN, CKD IV, hypothyroidism presenting with SOB likely 2/2 ADHF    #Neuro  - NAIs, mentating well, A&Ox3    #CV  - BNP 16,667, trop T 1842, elevated lactate, grossly overloaded on exam  - monitor I&Os, daily weights  - will Diurese    #Pulm  - currently on bipap 10/5, lactate 10.7, abg c/w HAGMA    # GI  - NAIs, c/w protonix  - transaminitis in the setting of ADHF, trend CMP daily    #Heme/onc  - H/H appears to be at baseline, 10.4  - keep active T&S    #Endo  - c/w home regimen of Lantus/Lispro    #ID  - Leukocytosis of 12.28, RR 48, elevated lactate however more consistent with ADHF  - monitor off abx    #Renal  - BUN 82/SCr 2.73, baseline SCr 1.9-2.0  - most likely 2/2 cardiorenal  - trend BMP daily, avoid nephrotoxic agents  - plan as above    #DVT ppx  - 73 YOF with PMHx of HTN, HLD, T2DM, GERD, CAD s/p CABG (LIMA to LAD, SVG to OM, SVG to PDA 2014 at [facility]), severe mitral regurgitation s/p mitral clip, severe pulmonary HTN, CKD IV, hypothyroidism presenting with SOB likely 2/2 ADHF    #Neuro  - NAIs, mentating well, A&Ox3    #CV  - obtain swan janessa, start inotropes based on those numbers  - BNP 16,667, trop T 1842, elevated lactate, grossly overloaded on exam  - monitor I&Os, daily weights  - will Diurese  - trend lactate  - repeat TTE  - LHC/RHC    #Pulm  - currently on bipap 10/5, lactate 10.7, abg c/w HAGMA    # GI  - NAIs, c/w protonix  - transaminitis in the setting of ADHF, trend CMP daily    #Heme/onc  - H/H appears to be at baseline, 10.4  - keep active T&S    #Endo  - c/w home regimen of Lantus/Lispro    #ID  - Leukocytosis of 12.28, RR 48, elevated lactate however more consistent with ADHF  - monitor off abx    #Renal  - BUN 82/SCr 2.73, baseline SCr 1.9-2.0  - most likely 2/2 cardiorenal  - trend BMP daily, avoid nephrotoxic agents  - plan as above    #DVT ppx  - 73 YOF with PMHx of HTN, HLD, T2DM, GERD, CAD s/p CABG (LIMA to LAD, SVG to OM, SVG to PDA 2014 at [facility]), severe mitral regurgitation s/p mitral clip, severe pulmonary HTN, CKD IV, hypothyroidism presenting with SOB likely 2/2 ADHF    #Neuro  - NAIs, mentating well, A&Ox3    #CV  - c/w asa/plavix  - bumex 4mg IVP x1, then bumex gtt at 2  - obtain swan janessa, start inotropes based on those numbers  - BNP 16,667, trop T 1842, elevated lactate, grossly overloaded on exam  - monitor I&Os, daily weights  - will Diurese  - trend lactate  - repeat TTE  - LHC/RHC    #Pulm  - currently on bipap 10/5, lactate 10.7, abg c/w HAGMA    # GI  - NAIs, c/w protonix  - transaminitis in the setting of ADHF, trend CMP daily    #Heme/onc  - H/H appears to be at baseline, 10.4  - keep active T&S    #Endo  - c/w home regimen of Lantus/Lispro  - levothyroxine 50mcg    #ID  - Leukocytosis of 12.28, RR 48, elevated lactate however more consistent with ADHF  - monitor off abx    #Renal  - BUN 82/SCr 2.73, baseline SCr 1.9-2.0  - c/w sodium bicarb 650mg TID  - most likely 2/2 cardiorenal  - trend BMP daily, avoid nephrotoxic agents  - plan as above    #DVT ppx  - heparin subQ 73 YOF with PMHx of HTN, HLD, T2DM, GERD, CAD s/p CABG (LIMA to LAD, SVG to OM, SVG to PDA 2014 at [facility]), severe mitral regurgitation s/p mitral clip, severe pulmonary HTN, CKD IV, hypothyroidism presenting with SOB likely 2/2 ADHF    #Neuro  - NAIs, mentating well, A&Ox3    #CV  - c/w asa/plavix  - bumex 4mg IVP x1, then bumex gtt at 2  - obtain swan janessa, start inotropes based on those numbers  - BNP 16,667, trop T 1842, elevated lactate, grossly overloaded on exam  - monitor I&Os, daily weights  - will Diurese  - trend lactate  - repeat TTE  - LHC/RHC    #Pulm  - currently on bipap 10/5, lactate 10.7, abg c/w HAGMA    # GI  - NAIs, c/w protonix 40mg qD  - transaminitis in the setting of ADHF, trend CMP daily    #Heme/onc  - H/H appears to be at baseline, 10.4  - keep active T&S    #Endo  - seen by endocrinology at previous admission, told to take levemir 82 U qAM and 90 U qPM. humalog 64U premeals TID  - will do insulin gtt for now  - will need endocrine c/s  - levothyroxine 50mcg    #ID  - Leukocytosis of 12.28, RR 48, elevated lactate however more consistent with ADHF  - monitor off abx    #Renal  - BUN 82/SCr 2.73, baseline SCr 1.9-2.0  - c/w sodium bicarb 650mg TID  - most likely 2/2 cardiorenal  - trend BMP daily, avoid nephrotoxic agents  - plan as above    #DVT ppx  - heparin subQ

## 2019-10-08 NOTE — PROGRESS NOTE ADULT - SUBJECTIVE AND OBJECTIVE BOX
====================  CCU MIDNIGHT ROUNDS  ====================    SELVIN CLAIRE  [id number]  Patient is a 73y old  Female who presents with a chief complaint of Acute decompensated heart failure, SOB (08 Oct 2019 10:07)    ====================  SUMMARY: 74 y/o F w/ PMH of HTN, HLD, DM2, GERD, CAD s/p CABG (LIMA to LAD, SVG to OM, SVG to PDA; 2014), severe MR s/p MitraClip, severe pHTN, CKD4, hypoTH c/o dyspnea x 2D suspect 2/2 ADHF w/ e/o organ hypoperfusion.    ====================        ====================  NEW EVENTS: Patient seen and examined at bedside. Daughter at bedside. Vital signs stable overnight. Patient reports having CP in the center of her chest as well as trouble breathing but slightly improved.    ====================        ====================  Vital Signs (last 12 hrs):  ====================    T(C): 37.2 (10-08-19 @ 21:00), Max: 37.2 (10-08-19 @ 21:00)  T(F): 99 (10-08-19 @ 21:00), Max: 99 (10-08-19 @ 21:00)  HR: 76 (10-08-19 @ 22:00) (68 - 82)  BP: --  BP(mean): --  ABP: 114/26 (10-08-19 @ 22:00) (102/38 - 170/26)  ABP(mean): 66 (10-08-19 @ 22:00) (66 - 90)  RR: 23 (10-08-19 @ 22:00) (14 - 42)  SpO2: 97% (10-08-19 @ 22:00) (95% - 100%)  Wt(kg): --  CVP(mm Hg): 7 (10-08-19 @ 22:00) (7 - 25)  CVP(cm H2O): --  CO: --  CI: --  PA: 52/17 (10-08-19 @ 22:00) (52/17 - 65/29)  PA(mean): 29 (10-08-19 @ 22:00) (29 - 174)  PCWP: --  SVR: --  PVR: --    I&O's Summary    07 Oct 2019 07:01  -  08 Oct 2019 07:00  --------------------------------------------------------  IN: 10 mL / OUT: 150 mL / NET: -140 mL    08 Oct 2019 07:01  -  08 Oct 2019 22:38  --------------------------------------------------------  IN: 569 mL / OUT: 2250 mL / NET: -1681 mL            ====================  NEW LABS:  ====================                        7.7    11.96 )-----------( 345      ( 08 Oct 2019 17:22 )             24.8     10-08    137  |  100  |  102<H>  ----------------------------<  223<H>  3.7   |  18<L>  |  3.04<H>    Ca    8.1<L>      08 Oct 2019 17:22  Phos  7.9     10-08    TPro  7.2  /  Alb  3.6  /  TBili  0.7  /  DBili  x   /  AST  2967<H>  /  ALT  1664<H>  /  AlkPhos  137<H>  10-08    PT/INR - ( 08 Oct 2019 09:36 )   PT: 20.3 sec;   INR: 1.75 ratio         PTT - ( 08 Oct 2019 17:22 )  PTT:79.8 sec  Creatine Kinase, Serum: 149 U/L (10-08-19 @ 14:01)  Creatine Kinase, Serum: 169 U/L (10-08-19 @ 09:36)    CKMB Units: 11.1 ng/mL (10-08 @ 14:01)  CKMB Units: 12.1 ng/mL (10-08 @ 09:36)    ABG - ( 08 Oct 2019 17:13 )  pH, Arterial: 7.41  pH, Blood: x     /  pCO2: 32    /  pO2: 104   / HCO3: 20    / Base Excess: -3.9  /  SaO2: 97                Blood Gas Source, Mixed: Mixed Blood Gas (10-08-19 @ 17:13)      ====================  Assessment & Plan:    #CV  Vessels - RHC done 10/8 s/p IABP. [place] placed. Elev troponin suspect 2/2 ADHF, however has known CAD. C/w ASA/clopidogrel. On Hep gtt. C/w vol optimize. LHC when SCr permits.  Pump - TTE with severely reduced LV function and moderate right ventricular systolic. C/w bumetanide 2mg/hr. Oscar in place. Strict Is/Os. C/w serial perfusion labs incl lactate Q1H.    Valves - F/u TTE, as above  Rhythm - C/w monitor.    #Neuro - No active issues  #Resp - Currently on NC, no longer on bipap. Vol optimize, as above.  #GI -  Trend liver tests  #Endo - Trend BGFS; cover PRN. May need insulin gtt if runs consistently hyperglycemic. C/w home med Synthroid. Check TFTs. Get endo c/s.  #Renal - Vol optimize, as above. MERVIN likely 2/2 acute congestion.  #ID - Leukocytosis. UA unremarkable. Bcxs x 2. CXR w/ infiltrates likely 2/2 edema. Procal elev. Check Ustrep/legionella. Favor empiric abxs for now (Vanc/zosyn); can taper off if sepsis w/u unremarkable.  #Hem - Full dose heparin, as above.  #Ethics - FC. C/w care in CCU.    José Daniels MD PGY-2  Department of Internal Medicine  Pager: [phone] (NS) /[phone] (EMILIE)   ====================

## 2019-10-08 NOTE — PROGRESS NOTE ADULT - ASSESSMENT
73 YOF with PMHx of HTN, HLD, T2DM, GERD, CAD s/p CABG (LIMA to LAD, SVG to OM, SVG to PDA 2014 at [facility]), severe mitral regurgitation s/p mitral clip, severe pulmonary HTN, CKD IV, hypothyroidism presenting with SOB likely 2/2 ADHF.    #Neuro  -NAIs, mentating well, A&Ox3    #CV  -c/w asa/plavix  -s/p bumex 4mg IVP x1, currently on bumex gtt at 2  -obtain swan janessa, start inotropes based on those numbers  -BNP 16,667, trop T 1842, elevated lactate, grossly overloaded on exam  -monitor I&Os, daily weights  -will Diurese  -trend lactate  -repeat TTE  -LHC/RHC    #Pulm  -currently on bipap 10/5, lactate 10.7, abg c/w HAGMA    #GI  -NAIs, c/w protonix 40mg qD  -transaminitis in the setting of ADHF, trend CMP daily--holding statin at this time     #Heme/onc  -H/H appears to be at baseline, 10.4  -keep active T&S    #Endo  -seen by endocrinology at previous admission, told to take levemir 82 U qAM and 90 U qPM. humalog 64U premeals TID  -will do insulin gtt for now  -will need endocrine c/s  -levothyroxine 50mcg    #ID  -Leukocytosis of 12.28, RR 48, elevated lactate however more consistent with ADHF  -monitor off abx    #Renal  -BUN 82/SCr 2.73, baseline SCr 1.9-2.0  -c/w sodium bicarb 650mg TID  -most likely 2/2 cardiorenal  - trend BMP daily, avoid nephrotoxic agents  -plan as above    #DVT ppx  -heparin subQ 73 YOF with PMHx of HTN, HLD, T2DM, GERD, CAD s/p CABG (LIMA to LAD, SVG to OM, SVG to PDA 2014 at [facility]), severe mitral regurgitation s/p mitral clip, severe pulmonary HTN, CKD IV, hypothyroidism presenting with SOB likely 2/2 ADHF.    #Neuro  -NAIs    #CV  -c/w asa/plavix  -s/p bumex 4mg IVP x1, currently on bumex gtt at 2  -obtain swan janessa, start inotropes based on those numbers  -BNP 26k on admission >> 16,667. trop T 1842 >> 1493, lactate 12.9 >> 5.8  -monitor I&Os, daily weights  -will Diurese  -trend lactate  -repeat TTE  -LHC/RHC    #Pulm  -currently on bipap 10/5, lactate 10.7, abg c/w HAGMA    #GI  -c/w protonix 40mg qD  -transaminitis in the setting of ADHF, trend CMP daily--holding statin at this time     #Heme/onc  -H/H appears to be at baseline 9-10, currently 10.4  -active T&S    #Endo  -seen by endocrinology at previous admission, told to take levemir 82 U qAM and 90 U qPM. humalog 64U premeals TID  -s/p insulin gtt on sliding scale for now   -will need endocrine c/s  -levothyroxine 50mcg    #ID  -Leukocytosis of 12.28, RR 48, elevated lactate however more consistent with ADHF  -monitor off abx    #Renal  -BUN 82/SCr 2.73, baseline SCr 1.9-2.0  -c/w sodium bicarb 650mg TID  -most likely 2/2 cardiorenal  - trend BMP daily, avoid nephrotoxic agents  -plan as above    #DVT ppx  -heparin subQ 73 YOF with PMHx of HTN, HLD, T2DM, GERD, CAD s/p CABG (LIMA to LAD, SVG to OM, SVG to PDA 2014 at [facility]), severe mitral regurgitation s/p mitral clip, severe pulmonary HTN, CKD IV, hypothyroidism presenting with SOB likely 2/2 ADHF.    #Neuro  -NAIs    #CV  -c/w asa/plavix. will start heparin gtt today   -s/p bumex 4mg IVP x1, currently on bumex gtt at 2  -obtain swan janessa, start inotropes based on those numbers  -BNP 26k on admission >> 16,667. trop T 1842 >> 1493, lactate 12.9 >> 5.8  -monitor I&Os, daily weights  -will Diurese  -trend lactate  -repeat TTE  -LHC/RHC    #Pulm  -currently on bipap 10/5, lactate 10.7, abg c/w HAGMA    #GI  -c/w protonix 40mg qD  -transaminitis in the setting of ADHF, trend CMP daily--holding statin at this time     #Heme/onc  -H/H appears to be at baseline 9-10, currently 10.4  -active T&S    #Endo  -seen by endocrinology at previous admission, told to take levemir 82 U qAM and 90 U qPM. humalog 64U premeals TID  -s/p insulin gtt on sliding scale for now   -will need endocrine c/s  -levothyroxine 50mcg    #ID  -Leukocytosis of 12.28, RR 48, elevated lactate however more consistent with ADHF  -monitor off abx    #Renal  -BUN 82/SCr 2.73, baseline SCr 1.9-2.0  -c/w sodium bicarb 650mg TID  -most likely 2/2 cardiorenal  - trend BMP daily, avoid nephrotoxic agents  -plan as above    #DVT ppx  -heparin subQ

## 2019-10-08 NOTE — PATIENT PROFILE ADULT - OVER THE PAST TWO WEEKS, HAVE YOU FELT LITTLE INTEREST OR PLEASURE IN DOING THINGS?
Head Injury, Adult  ImageThere are many types of head injuries. They can be as minor as a bump. Some head injuries can be worse. Worse injuries include:  A strong hit to the head that hurts the brain (concussion).  A bruise of the brain (contusion). This means there is bleeding in the brain that can cause swelling.  A cracked skull (skull fracture).  Bleeding in the brain that gathers, gets thick (makes a clot), and forms a bump (hematoma).  Most problems from a head injury come in the first 24 hours. However, you may still have side effects up to 7–10 days after your injury. It is important to watch your condition for any changes.    Follow these instructions at home:  Activity     Rest as much as possible.  Avoid activities that are hard or tiring.  Make sure you get enough sleep.  Limit activities that need a lot of thought or attention, such as:  Watching TV.  Playing memory games and puzzles.  Job-related work or homework.  Working on the computer, social media, and texting.  Avoid activities that could cause another head injury until your doctor says it is okay. This includes playing sports.  Ask your doctor when it is safe for you to go back to your normal activities, such as work or school. Ask your doctor for a step-by-step plan for slowly going back to your normal activities.  Ask your doctor when you can drive, ride a bicycle, or use heavy machinery. Never do these activities if you are dizzy.  Lifestyle     Do not drink alcohol until your doctor says it is okay.  Avoid drug use.  If it is harder than usual to remember things, write them down.  If you are easily distracted, try to do one thing at a time.  Talk with family members or close friends when making important decisions.  Tell your friends, family, a trusted coworker, and  about your injury, symptoms, and limits (restrictions). Have them watch for any problems that are new or getting worse.  General instructions     Take over-the-counter and prescription medicines only as told by your doctor.  Have someone stay with you for 24 hours after your head injury. This person should watch you for any changes in your symptoms and be ready to get help.  Keep all follow-up visits as told by your doctor. This is important.  How is this prevented?  Work on your balance and strength. This can help you avoid falls.  Wear a seatbelt when you are in a moving vehicle.  Wear a helmet when:  Riding a bicycle.  Skiing.  Doing any other sport or activity that has a risk of injury.  Drink alcohol only in moderation.  Make your home safer by:  Getting rid of clutter from the floors and stairs, like things that can make you trip.  Using grab bars in bathrooms and handrails by stairs.  Placing non-slip mats on floors and in bathtubs.  Putting more light in dim areas.  Get help right away if:  You have:  A very bad (severe) headache that is not helped by medicine.  Trouble walking or weakness in your arms and legs.  Clear or bloody fluid coming from your nose or ears.  Changes in your seeing (vision).  Jerky movements that you cannot control (seizure).  You throw up (vomit).  Your symptoms get worse.  You lose balance.  Your speech is slurred.  You pass out.  You are sleepier and have trouble staying awake.  The black centers of your eyes (pupils) change in size.  These symptoms may be an emergency. Do not wait to see if the symptoms will go away. Get medical help right away. Call your local emergency services (911 in the [place] Do not drive yourself to the hospital.     This information is not intended to replace advice given to you by your health care provider. Make sure you discuss any questions you have with your health care provider. no

## 2019-10-08 NOTE — H&P ADULT - NSHPLABSRESULTS_GEN_ALL_CORE
10.4    )-----------( 532      ( 07 Oct 2019 22:28 )             37.3     10-08    136  |  101  |  82<H>  ----------------------------<  264<H>  5.4<H>   |  8<LL>  |  2.73<H>    Ca    7.7<L>      08 Oct 2019 00:40    TPro  8.5<H>  /  Alb  4.1  /  TBili  1.3<H>  /  DBili  x   /  AST  405<H>  /  ALT  263<H>  /  AlkPhos  158<H>  10-07    Urinalysis Basic - ( 08 Oct 2019 02:35 )    Color: Yellow / Appearance: Clear / S.016 / pH: x  Gluc: x / Ketone: Negative  / Bili: Negative / Urobili: Negative   Blood: x / Protein: 30 mg/dL / Nitrite: Negative   Leuk Esterase: Negative / RBC: 1 /hpf / WBC 0 /HPF   Sq Epi: x / Non Sq Epi: 2 /hpf / Bacteria: Negative    AB.29/18/8  Lactate 10.7    Trop T: 1842  BNP: 16,667    procalcitonin: 0.27    < from: Xray Chest 1 View-PORTABLE IMMEDIATE (10.07.19 @ 22:41) >     Trace right pleural effusion, otherwise clear lungs.    TTE from : severe global LV systolic dysfunction, severe pulm HTN, mild- mod tricuspid regurg  EF: 21% 10.4    )-----------( 532      ( 07 Oct 2019 22:28 )             37.3     10-08    136  |  101  |  82<H>  ----------------------------<  264<H>  5.4<H>   |  8<LL>  |  2.73<H>    Ca    7.7<L>      08 Oct 2019 00:40    TPro  8.5<H>  /  Alb  4.1  /  TBili  1.3<H>  /  DBili  x   /  AST  405<H>  /  ALT  263<H>  /  AlkPhos  158<H>  10-07    Urinalysis Basic - ( 08 Oct 2019 02:35 )    Color: Yellow / Appearance: Clear / S.016 / pH: x  Gluc: x / Ketone: Negative  / Bili: Negative / Urobili: Negative   Blood: x / Protein: 30 mg/dL / Nitrite: Negative   Leuk Esterase: Negative / RBC: 1 /hpf / WBC 0 /HPF   Sq Epi: x / Non Sq Epi: 2 /hpf / Bacteria: Negative    AB.29/18/8  Lactate 10.7    Trop T: 1842  BNP: 16,667    procalcitonin: 0.27    < from: Xray Chest 1 View-PORTABLE IMMEDIATE (10.07.19 @ 22:41) >     Trace right pleural effusion, otherwise clear lungs.    TTE from : severe global LV systolic dysfunction, severe pulm HTN, mild- mod tricuspid regurg  EF: 21%    EKG: SINUS BRADYCARDIA, INDETERMINATE AXIS, RIGHT BUNDLE BRANCH BLOCK, T WAVE ABNORMALITY, CONSIDER INFEROLATERAL ISCHEMIA

## 2019-10-08 NOTE — H&P ADULT - HISTORY OF PRESENT ILLNESS
73 YOF with PMHx of HTN, HLD, T2DM, GERD, CAD s/p CABG (LIMA to LAD, SVG to OM, SVG to PDA 2014 at [facility]), severe mitral regurgitation s/p mitral clip, severe pulmonary HTN, CKD IV, hypothyroidism presenting with 2 day history of SOB. Pt states she has been feeling increasingly short of breath for the past couple of days more pronounced with exertion a/w chest heaviness. Also reports a cough. Not able to discern whether dry or productive. She reports that her daughter gives her all her medications and that she has been compliant with her meds. No recent changes to her diet. Denies any recent illness including a cold. She sleeps with 2 pillows. ROS otherwise negative. In the ED was given asa 162 mg, zosyn x1, vanc 1 g. 73 YOF with PMHx of HTN, HLD, T2DM, GERD, CAD s/p CABG (LIMA to LAD, SVG to OM, SVG to PDA 2014 at [facility]), severe mitral regurgitation s/p mitral clip, severe pulmonary HTN, CKD IV, hypothyroidism presenting with 2 day history of SOB. Pt states she has been feeling increasingly short of breath for the past couple of days more pronounced with exertion a/w chest heaviness. Also reports a cough. Not able to discern whether dry or productive. She reports that her daughter gives her all her medications and that she has been compliant with her meds. No recent changes to her diet. Denies any recent illness including a cold. She sleeps with 2 pillows. ROS otherwise negative. In the ED was given asa 162 mg, zosyn x1, vanc 1 g.     VS:  98 F, BP 86/54, HR 54, RR 48, on Bipap 10/5--> 100%

## 2019-10-08 NOTE — ED ADULT NURSE REASSESSMENT NOTE - NS ED NURSE REASSESS COMMENT FT1
repeat fingersticMD Dell andrew states not to start insulin drip MD Sharpe states not to start insulin drip

## 2019-10-08 NOTE — CONSULT NOTE ADULT - SUBJECTIVE AND OBJECTIVE BOX
[facility]   MD MEGHANA SHAH D.O. FATIMA SHEIKH, D.O. RUORU WONG, PA    OFFICE: [phone]  DR SANTOYO CELL: [phone]  DR. ALAS CELL: [phone]  DR. RIDER CELL: [phone]  RASHIDA KENDALL CELL: [phone]      -- INITIAL RENAL CONSULT NOTE  --------------------------------------------------------------------------------  HPI: Ms. Gamino is a 72 yo F w/ PMH of CKD stage 4 (baseline Cr 1.9-2.2) HTN, T2DM, CAD s/p CABG X3 (2014 at [facility]), non-dilated ICM (EF 20-25%), severe mitral regurgitation s/p mitral clip (6/13/19), severe pulm HTN, hypothyroidism who presented for evaluation of chest pain, cough, and SOB for the past 2 days. Was admitted for ADHF. Pt is currently on BiPAP and only nods/shakes head to questions so Hx obtained from chart.         PAST HISTORY  --------------------------------------------------------------------------------  PAST MEDICAL & SURGICAL HISTORY:  GERD (gastroesophageal reflux disease)  CAD (coronary artery disease): s/p CABG  Hypothyroidism  Hyperlipidemia  Diabetes mellitus, type 2  S/P CABG (coronary artery bypass graft)    FAMILY HISTORY:  Family history of hypertension (Sibling): sister  Family history of diabetes mellitus (Sibling): brothers/sisters    PAST SOCIAL HISTORY:    ALLERGIES & MEDICATIONS  --------------------------------------------------------------------------------  Allergies    azithromycin (Hives; Pruritus)    Intolerances      Standing Inpatient Medications  aspirin enteric coated 81 milliGRAM(s) Oral daily  buMETAnide Infusion 2 mG/Hr IV Continuous <Continuous>  chlorhexidine 2% Cloths 1 Application(s) Topical <User Schedule>  clopidogrel Tablet 75 milliGRAM(s) Oral daily  dextrose 5%. 1000 milliLiter(s) IV Continuous <Continuous>  dextrose 50% Injectable 12.5 Gram(s) IV Push once  dextrose 50% Injectable 25 Gram(s) IV Push once  dextrose 50% Injectable 25 Gram(s) IV Push once  docusate sodium 100 milliGRAM(s) Oral two times a day  heparin  Infusion.  Unit(s)/Hr IV Continuous <Continuous>  insulin lispro (HumaLOG) corrective regimen sliding scale   SubCutaneous three times a day before meals  levothyroxine 50 MICROGram(s) Oral daily  pantoprazole    Tablet 40 milliGRAM(s) Oral before breakfast  piperacillin/tazobactam IVPB. 3.375 Gram(s) IV Intermittent once  piperacillin/tazobactam IVPB.. 3.375 Gram(s) IV Intermittent every 12 hours  polyethylene glycol 3350 17 Gram(s) Oral daily  senna 2 Tablet(s) Oral at bedtime  sodium bicarbonate 650 milliGRAM(s) Oral two times a day  vancomycin  IVPB      vancomycin  IVPB 750 milliGRAM(s) IV Intermittent once  vancomycin  IVPB 750 milliGRAM(s) IV Intermittent every 12 hours    PRN Inpatient Medications  ALPRAZolam 0.25 milliGRAM(s) Oral three times a day PRN  dextrose 40% Gel 15 Gram(s) Oral once PRN  glucagon  Injectable 1 milliGRAM(s) IntraMuscular once PRN  heparin  Injectable 3200 Unit(s) IV Push every 6 hours PRN      REVIEW OF SYSTEMS  --------------------------------------------------------------------------------  Gen: No fevers/chills  Skin: No rashes  Head/Eyes/Ears: Normal hearing,  Normal vision   Respiratory: + dyspnea, - cough  CV: No chest pain  GI: No abdominal pain, diarrhea, constipation, nausea, vomiting  : No dysuria, hematuria  MSK: No  edema  Heme: No easy bruising or bleeding  Psych: No significant depression    All other systems were reviewed and are negative, except as noted.    VITALS/PHYSICAL EXAM  --------------------------------------------------------------------------------  T(C): 36.7 (10-08-19 @ 07:37), Max: 36.7 (10-07-19 @ 22:01)  HR: 78 (10-08-19 @ 09:45) (54 - 82)  BP: 90/54 (10-08-19 @ 08:00) (86/54 - 124/75)  RR: 21 (10-08-19 @ 09:45) (20 - 48)  SpO2: 100% (10-08-19 @ 09:45) (98% - 100%)  Wt(kg): --  Height (cm): 149.86 (10-07-19 @ 21:25)  Weight (kg): 53.1 (10-07-19 @ 21:25)  BMI (kg/m2): 23.6 (10-07-19 @ 21:25)  BSA (m2): 1.47 (10-07-19 @ 21:25)      10-07-19 @ 07:01  -  10-08-19 @ 07:00  --------------------------------------------------------  IN: 10 mL / OUT: 150 mL / NET: -140 mL    10-08-19 @ 07:01  -  10-08-19 @ 10:08  --------------------------------------------------------  IN: 30 mL / OUT: 200 mL / NET: -170 mL      Physical Exam:  	Gen: NAD  	HEENT: MMM  	Pulm: Diffuse rhonchi throughout, minimal end-expiratory wheezes B/L  	CV: S1S2, + murmur  	Abd: Soft, +BS   	Ext: No LE edema B/L  	Neuro: Awake  	Skin: Warm and dry      LABS/STUDIES  --------------------------------------------------------------------------------              10.4   12.28 >-----------<  532      [10-07-19 @ 22:28]              37.3     135  |  95  |  102  ----------------------------<  297      [10-08-19 @ 05:42]  4.8   |  13  |  3.26        Ca     9.1     [10-08-19 @ 05:42]    TPro  7.3  /  Alb  3.4  /  TBili  0.8  /  DBili  x   /  AST  2125  /  ALT  1298  /  AlkPhos  139  [10-08-19 @ 05:42]    PT/INR: PT 20.3 , INR 1.75       [10-08-19 @ 09:36]  PTT: 40.3       [10-08-19 @ 09:36]      Creatinine Trend:  SCr 3.26 [10-08 @ 05:42]  SCr 2.73 [10-08 @ 00:40]  SCr 3.00 [10-07 @ 22:28]    Urinalysis - [10-08-19 @ 02:35]      Color Yellow / Appearance Clear / SG 1.016 / pH 5.5      Gluc Trace / Ketone Negative  / Bili Negative / Urobili Negative       Blood Negative / Protein 30 mg/dL / Leuk Est Negative / Nitrite Negative      RBC 1 / WBC 0 / Hyaline 6 / Gran 3-5 / Sq Epi  / Non Sq Epi 2 / Bacteria Negative      Iron 42, TIBC 348, %sat 12      [07-05-19 @ 22:32]  Ferritin 130      [07-05-19 @ 22:32]  .4 (Ca --)      [04-25-19 @ 05:45]   --  Vitamin D (25OH) 25.9      [05-01-19 @ 04:00]  HbA1c 8.7      [09-05-19 @ 13:08]  TSH 1.02      [08-26-19 @ 08:22]  Lipid: chol 148, , HDL 35,       [06-01-19 @ 08:00]    HBsAg Nonreactive      [04-01-15 @ 15:00]  HCV 0.06, Nonreactive Hepatitis C AB  S/CO Ratio                        Interpretation  < 1.00                                   Non-Reactive  1.00 - 4.99                         Weakly-Reactive  > 5.00                                Reactive  Non-Reactive: A person with a non-reactive HCV antibody  result is considered uninfected.  No further action is  needed unless recent infection is suspected.  In these  cases, consider repeat testing later to detect  seroconversion..  Weakly-Reactive: HCV antibody test is abnormal, HCV RNA  Qualitative test will follow.  Reactive: HCV antibody test is abnormal, HCV RNA  Qualitative test will follow.  Note: HCV antibody testing is performed on the [employer] system.      [04-25-19 @ 05:45]

## 2019-10-08 NOTE — ED ADULT NURSE REASSESSMENT NOTE - NS ED NURSE REASSESS COMMENT FT1
Oscar catheter inserted using sterile technique, draining by gravity, secured with StatLock. Second RN present to confirm sterility.

## 2019-10-08 NOTE — CONSULT NOTE ADULT - ASSESSMENT
72 yo F w/ PMH of CKD stage 4 (baseline Cr 1.9-2.2) HTN, T2DM, CAD s/p CABG X3 (2014 at [facility]), non-dilated ICM (EF 20-25%), severe mitral regurgitation s/p mitral clip (6/13/19), severe pulm HTN, hypothyroidism who presented for evaluation of chest pain, cough, and SOB for the past 2 days and was admitted for ADHF. Nephrology is consulted for MERVIN    MERVIN on CKD stage 4  - baseline Cr 1.9-2.2; has had recurrent MERVIN's over the years  - CKD is likely 2/2 recurrent MERVIN's + underlying DM/HTN  - MERVIN is likely 2/2 CRS     - admitted w/ cr 3; started on bumex drip this AM  - continue Bumex drip     - check UA, Urine Na, Urine Cr, Urine Cl, Urine protein, Urine Urea  - check renal sonogram  - Avoid nephrotoxins including NSAIDs, IV contrast (if possible), Fleet's products  - maintain MAP >65  - monitor I/O's accurately    - Discussed w/ pt the possibility that she may need renal replacement therapy if she does not respond to diuretics as she has underlying advanced CKD. Also discussed the possibility that if she has a cardiac cath, this could worsen her renal function further and she could require renal replacement therapy. Pt understood and agreed to proceed w/ cardiac cath, should it become necessary.    Metabolic acidosis  - 2/2 lactic acidosis 2/2 hypoperfusion/hypotension/dec cardiac output  - should improve w/ improved perfusion  - monitor  - ok to continue sodium bicarb 650 mg BID for now but monitor volume status as this is a salt load    Hypotension  - likely from dec cardiac output and hypoperfusion  - hold anti-hypertensives (hydralazine, imdur, spironolactone)  - beta blocker per cardio recs  - continue bumex drip but monitor BP closely    Proteinuria/Hematuria  - likely from underlying DM  - Hep C neg in 2017  - check spot protein/Cr, Hep B (sAg, cAb, sAb, eAg), HIV, syphilis, SPEP, UPEP, serum immunofixation, ANCA, C3 and C4 complement levels, DEAN, rheumatoid factor, cryoglobulin

## 2019-10-09 DIAGNOSIS — Z71.89 OTHER SPECIFIED COUNSELING: ICD-10-CM

## 2019-10-09 LAB
ALBUMIN SERPL ELPH-MCNC: 3.2 G/DL — LOW (ref 3.3–5)
ALBUMIN SERPL ELPH-MCNC: 3.2 G/DL — LOW (ref 3.3–5)
ALBUMIN SERPL ELPH-MCNC: 3.4 G/DL — SIGNIFICANT CHANGE UP (ref 3.3–5)
ALBUMIN SERPL ELPH-MCNC: 3.4 G/DL — SIGNIFICANT CHANGE UP (ref 3.3–5)
ALP SERPL-CCNC: 138 U/L — HIGH (ref 40–120)
ALP SERPL-CCNC: 139 U/L — HIGH (ref 40–120)
ALP SERPL-CCNC: 144 U/L — HIGH (ref 40–120)
ALP SERPL-CCNC: 153 U/L — HIGH (ref 40–120)
ALT FLD-CCNC: 1853 U/L — HIGH (ref 10–45)
ALT FLD-CCNC: 2077 U/L — HIGH (ref 10–45)
ALT FLD-CCNC: 2168 U/L — HIGH (ref 10–45)
ANION GAP SERPL CALC-SCNC: 19 MMOL/L — HIGH (ref 5–17)
APTT BLD: 54.3 SEC — HIGH (ref 27.5–36.3)
APTT BLD: 73.4 SEC — HIGH (ref 27.5–36.3)
APTT BLD: 77.2 SEC — HIGH (ref 27.5–36.3)
APTT BLD: 93.8 SEC — HIGH (ref 27.5–36.3)
AST SERPL-CCNC: 1676 U/L — HIGH (ref 10–40)
AST SERPL-CCNC: 2726 U/L — HIGH (ref 10–40)
AST SERPL-CCNC: 3020 U/L — HIGH (ref 10–40)
BASE EXCESS BLDMV CALC-SCNC: -0.4 MMOL/L — SIGNIFICANT CHANGE UP (ref -3–3)
BASE EXCESS BLDMV CALC-SCNC: 1.8 MMOL/L — SIGNIFICANT CHANGE UP (ref -3–3)
BASOPHILS # BLD AUTO: 0.03 K/UL — SIGNIFICANT CHANGE UP (ref 0–0.2)
BASOPHILS # BLD AUTO: 0.06 K/UL — SIGNIFICANT CHANGE UP (ref 0–0.2)
BASOPHILS NFR BLD AUTO: 0.3 % — SIGNIFICANT CHANGE UP (ref 0–2)
BASOPHILS NFR BLD AUTO: 0.5 % — SIGNIFICANT CHANGE UP (ref 0–2)
BILIRUB SERPL-MCNC: 0.6 MG/DL — SIGNIFICANT CHANGE UP (ref 0.2–1.2)
BILIRUB SERPL-MCNC: 0.7 MG/DL — SIGNIFICANT CHANGE UP (ref 0.2–1.2)
BUN SERPL-MCNC: 102 MG/DL — HIGH (ref 7–23)
BUN SERPL-MCNC: 103 MG/DL — HIGH (ref 7–23)
BUN SERPL-MCNC: 105 MG/DL — HIGH (ref 7–23)
BUN SERPL-MCNC: 108 MG/DL — HIGH (ref 7–23)
CALCIUM SERPL-MCNC: 8.1 MG/DL — LOW (ref 8.4–10.5)
CALCIUM SERPL-MCNC: 8.2 MG/DL — LOW (ref 8.4–10.5)
CALCIUM SERPL-MCNC: 8.4 MG/DL — SIGNIFICANT CHANGE UP (ref 8.4–10.5)
CALCIUM SERPL-MCNC: 9 MG/DL — SIGNIFICANT CHANGE UP (ref 8.4–10.5)
CHLORIDE SERPL-SCNC: 100 MMOL/L — SIGNIFICANT CHANGE UP (ref 96–108)
CHLORIDE SERPL-SCNC: 100 MMOL/L — SIGNIFICANT CHANGE UP (ref 96–108)
CHLORIDE SERPL-SCNC: 96 MMOL/L — SIGNIFICANT CHANGE UP (ref 96–108)
CO2 BLDMV-SCNC: 25 MMOL/L — SIGNIFICANT CHANGE UP (ref 21–29)
CO2 BLDMV-SCNC: 27 MMOL/L — SIGNIFICANT CHANGE UP (ref 21–29)
CO2 SERPL-SCNC: 17 MMOL/L — LOW (ref 22–31)
CO2 SERPL-SCNC: 19 MMOL/L — LOW (ref 22–31)
CO2 SERPL-SCNC: 22 MMOL/L — SIGNIFICANT CHANGE UP (ref 22–31)
CREAT SERPL-MCNC: 2.86 MG/DL — HIGH (ref 0.5–1.3)
CREAT SERPL-MCNC: 2.9 MG/DL — HIGH (ref 0.5–1.3)
CREAT SERPL-MCNC: 2.92 MG/DL — HIGH (ref 0.5–1.3)
CREAT SERPL-MCNC: 3.05 MG/DL — HIGH (ref 0.5–1.3)
CULTURE RESULTS: NO GROWTH — SIGNIFICANT CHANGE UP
EOSINOPHIL # BLD AUTO: 0.27 K/UL — SIGNIFICANT CHANGE UP (ref 0–0.5)
EOSINOPHIL # BLD AUTO: 0.34 K/UL — SIGNIFICANT CHANGE UP (ref 0–0.5)
EOSINOPHIL NFR BLD AUTO: 2.1 % — SIGNIFICANT CHANGE UP (ref 0–6)
EOSINOPHIL NFR BLD AUTO: 3 % — SIGNIFICANT CHANGE UP (ref 0–6)
GAS PNL BLDA: SIGNIFICANT CHANGE UP
GAS PNL BLDA: SIGNIFICANT CHANGE UP
GAS PNL BLDMV: SIGNIFICANT CHANGE UP
GAS PNL BLDMV: SIGNIFICANT CHANGE UP
GLUCOSE SERPL-MCNC: 186 MG/DL — HIGH (ref 70–99)
GLUCOSE SERPL-MCNC: 229 MG/DL — HIGH (ref 70–99)
GLUCOSE SERPL-MCNC: 245 MG/DL — HIGH (ref 70–99)
GLUCOSE SERPL-MCNC: 304 MG/DL — HIGH (ref 70–99)
HCO3 BLDMV-SCNC: 24 MMOL/L — SIGNIFICANT CHANGE UP (ref 20–28)
HCO3 BLDMV-SCNC: 26 MMOL/L — SIGNIFICANT CHANGE UP (ref 20–28)
HCT VFR BLD CALC: 24.8 % — LOW (ref 34.5–45)
HCT VFR BLD CALC: 27.3 % — LOW (ref 34.5–45)
HGB BLD-MCNC: 7.7 G/DL — LOW (ref 11.5–15.5)
HGB BLD-MCNC: 8.2 G/DL — LOW (ref 11.5–15.5)
HOROWITZ INDEX BLDMV+IHG-RTO: 21 — SIGNIFICANT CHANGE UP
HOROWITZ INDEX BLDMV+IHG-RTO: 32 — SIGNIFICANT CHANGE UP
IMM GRANULOCYTES NFR BLD AUTO: 1.3 % — SIGNIFICANT CHANGE UP (ref 0–1.5)
IMM GRANULOCYTES NFR BLD AUTO: 1.6 % — HIGH (ref 0–1.5)
INR BLD: 1.66 RATIO — HIGH (ref 0.88–1.16)
LACTATE SERPL-SCNC: 1.7 MMOL/L — SIGNIFICANT CHANGE UP (ref 0.7–2)
LEGIONELLA AG UR QL: NEGATIVE — SIGNIFICANT CHANGE UP
LYMPHOCYTES # BLD AUTO: 0.97 K/UL — LOW (ref 1–3.3)
LYMPHOCYTES # BLD AUTO: 1.41 K/UL — SIGNIFICANT CHANGE UP (ref 1–3.3)
LYMPHOCYTES # BLD AUTO: 12.4 % — LOW (ref 13–44)
LYMPHOCYTES # BLD AUTO: 7.4 % — LOW (ref 13–44)
MAGNESIUM SERPL-MCNC: 2.5 MG/DL — SIGNIFICANT CHANGE UP (ref 1.6–2.6)
MAGNESIUM SERPL-MCNC: 2.6 MG/DL — SIGNIFICANT CHANGE UP (ref 1.6–2.6)
MCHC RBC-ENTMCNC: 25.9 PG — LOW (ref 27–34)
MCHC RBC-ENTMCNC: 26.6 PG — LOW (ref 27–34)
MCHC RBC-ENTMCNC: 30 GM/DL — LOW (ref 32–36)
MCHC RBC-ENTMCNC: 31 GM/DL — LOW (ref 32–36)
MCV RBC AUTO: 85.5 FL — SIGNIFICANT CHANGE UP (ref 80–100)
MCV RBC AUTO: 86.4 FL — SIGNIFICANT CHANGE UP (ref 80–100)
MONOCYTES # BLD AUTO: 0.67 K/UL — SIGNIFICANT CHANGE UP (ref 0–0.9)
MONOCYTES # BLD AUTO: 0.73 K/UL — SIGNIFICANT CHANGE UP (ref 0–0.9)
MONOCYTES NFR BLD AUTO: 5.6 % — SIGNIFICANT CHANGE UP (ref 2–14)
MONOCYTES NFR BLD AUTO: 5.9 % — SIGNIFICANT CHANGE UP (ref 2–14)
NEUTROPHILS # BLD AUTO: 10.86 K/UL — HIGH (ref 1.8–7.4)
NEUTROPHILS # BLD AUTO: 8.72 K/UL — HIGH (ref 1.8–7.4)
NEUTROPHILS NFR BLD AUTO: 76.8 % — SIGNIFICANT CHANGE UP (ref 43–77)
NEUTROPHILS NFR BLD AUTO: 83.1 % — HIGH (ref 43–77)
NRBC # BLD: 0 /100 WBCS — SIGNIFICANT CHANGE UP (ref 0–0)
NRBC # BLD: 0 /100 WBCS — SIGNIFICANT CHANGE UP (ref 0–0)
O2 CT VFR BLD CALC: 33 MMHG — SIGNIFICANT CHANGE UP (ref 30–65)
O2 CT VFR BLD CALC: 36 MMHG — SIGNIFICANT CHANGE UP (ref 30–65)
PCO2 BLDMV: 38 MMHG — SIGNIFICANT CHANGE UP (ref 30–65)
PCO2 BLDMV: 40 MMHG — SIGNIFICANT CHANGE UP (ref 30–65)
PH BLDMV: 7.41 — SIGNIFICANT CHANGE UP (ref 7.32–7.45)
PH BLDMV: 7.43 — SIGNIFICANT CHANGE UP (ref 7.32–7.45)
PHOSPHATE SERPL-MCNC: 4.7 MG/DL — HIGH (ref 2.5–4.5)
PHOSPHATE SERPL-MCNC: 5 MG/DL — HIGH (ref 2.5–4.5)
PLATELET # BLD AUTO: 251 K/UL — SIGNIFICANT CHANGE UP (ref 150–400)
PLATELET # BLD AUTO: 341 K/UL — SIGNIFICANT CHANGE UP (ref 150–400)
POTASSIUM SERPL-MCNC: 3.3 MMOL/L — LOW (ref 3.5–5.3)
POTASSIUM SERPL-MCNC: 3.6 MMOL/L — SIGNIFICANT CHANGE UP (ref 3.5–5.3)
POTASSIUM SERPL-MCNC: 3.7 MMOL/L — SIGNIFICANT CHANGE UP (ref 3.5–5.3)
POTASSIUM SERPL-SCNC: 3.3 MMOL/L — LOW (ref 3.5–5.3)
POTASSIUM SERPL-SCNC: 3.6 MMOL/L — SIGNIFICANT CHANGE UP (ref 3.5–5.3)
POTASSIUM SERPL-SCNC: 3.7 MMOL/L — SIGNIFICANT CHANGE UP (ref 3.5–5.3)
PROT ?TM UR-MCNC: 20 MG/DL — HIGH (ref 0–12)
PROT SERPL-MCNC: 6.7 G/DL — SIGNIFICANT CHANGE UP (ref 6–8.3)
PROT SERPL-MCNC: 7 G/DL — SIGNIFICANT CHANGE UP (ref 6–8.3)
PROT SERPL-MCNC: 7.2 G/DL — SIGNIFICANT CHANGE UP (ref 6–8.3)
PROT SERPL-MCNC: 7.2 G/DL — SIGNIFICANT CHANGE UP (ref 6–8.3)
PROTHROM AB SERPL-ACNC: 19.2 SEC — HIGH (ref 10–12.9)
RBC # BLD: 2.9 M/UL — LOW (ref 3.8–5.2)
RBC # BLD: 3.16 M/UL — LOW (ref 3.8–5.2)
RBC # FLD: 18.5 % — HIGH (ref 10.3–14.5)
RBC # FLD: 18.6 % — HIGH (ref 10.3–14.5)
SAO2 % BLDMV: 51 % — LOW (ref 60–90)
SAO2 % BLDMV: 57 % — LOW (ref 60–90)
SODIUM SERPL-SCNC: 136 MMOL/L — SIGNIFICANT CHANGE UP (ref 135–145)
SODIUM SERPL-SCNC: 137 MMOL/L — SIGNIFICANT CHANGE UP (ref 135–145)
SODIUM SERPL-SCNC: 138 MMOL/L — SIGNIFICANT CHANGE UP (ref 135–145)
SPECIMEN SOURCE: SIGNIFICANT CHANGE UP
URATE UR-MCNC: 23.5 MG/DL — SIGNIFICANT CHANGE UP
VANCOMYCIN TROUGH SERPL-MCNC: 30.2 UG/ML — CRITICAL HIGH (ref 10–20)
WBC # BLD: 11.35 K/UL — HIGH (ref 3.8–10.5)
WBC # BLD: 13.06 K/UL — HIGH (ref 3.8–10.5)
WBC # FLD AUTO: 11.35 K/UL — HIGH (ref 3.8–10.5)
WBC # FLD AUTO: 13.06 K/UL — HIGH (ref 3.8–10.5)

## 2019-10-09 PROCEDURE — 71045 X-RAY EXAM CHEST 1 VIEW: CPT | Mod: 26

## 2019-10-09 PROCEDURE — 93010 ELECTROCARDIOGRAM REPORT: CPT

## 2019-10-09 PROCEDURE — 76770 US EXAM ABDO BACK WALL COMP: CPT | Mod: 26

## 2019-10-09 PROCEDURE — 99291 CRITICAL CARE FIRST HOUR: CPT

## 2019-10-09 RX ORDER — NITROGLYCERIN 6.5 MG
25 CAPSULE, EXTENDED RELEASE ORAL
Qty: 50 | Refills: 0 | Status: DISCONTINUED | OUTPATIENT
Start: 2019-10-09 | End: 2019-10-10

## 2019-10-09 RX ORDER — INSULIN LISPRO 100/ML
3 VIAL (ML) SUBCUTANEOUS
Refills: 0 | Status: DISCONTINUED | OUTPATIENT
Start: 2019-10-09 | End: 2019-10-09

## 2019-10-09 RX ORDER — POTASSIUM CHLORIDE 20 MEQ
20 PACKET (EA) ORAL
Refills: 0 | Status: COMPLETED | OUTPATIENT
Start: 2019-10-09 | End: 2019-10-09

## 2019-10-09 RX ORDER — INSULIN LISPRO 100/ML
6 VIAL (ML) SUBCUTANEOUS
Refills: 0 | Status: DISCONTINUED | OUTPATIENT
Start: 2019-10-09 | End: 2019-10-10

## 2019-10-09 RX ORDER — INSULIN LISPRO 100/ML
VIAL (ML) SUBCUTANEOUS AT BEDTIME
Refills: 0 | Status: DISCONTINUED | OUTPATIENT
Start: 2019-10-09 | End: 2019-10-12

## 2019-10-09 RX ORDER — INSULIN GLARGINE 100 [IU]/ML
10 INJECTION, SOLUTION SUBCUTANEOUS AT BEDTIME
Refills: 0 | Status: DISCONTINUED | OUTPATIENT
Start: 2019-10-09 | End: 2019-10-09

## 2019-10-09 RX ORDER — CEFEPIME 1 G/1
1000 INJECTION, POWDER, FOR SOLUTION INTRAMUSCULAR; INTRAVENOUS EVERY 24 HOURS
Refills: 0 | Status: DISCONTINUED | OUTPATIENT
Start: 2019-10-09 | End: 2019-10-10

## 2019-10-09 RX ORDER — OXYCODONE HYDROCHLORIDE 5 MG/1
5 TABLET ORAL ONCE
Refills: 0 | Status: DISCONTINUED | OUTPATIENT
Start: 2019-10-09 | End: 2019-10-09

## 2019-10-09 RX ORDER — BUMETANIDE 0.25 MG/ML
1 INJECTION INTRAMUSCULAR; INTRAVENOUS
Qty: 20 | Refills: 0 | Status: DISCONTINUED | OUTPATIENT
Start: 2019-10-09 | End: 2019-10-09

## 2019-10-09 RX ORDER — SODIUM CHLORIDE 9 MG/ML
1000 INJECTION INTRAMUSCULAR; INTRAVENOUS; SUBCUTANEOUS
Refills: 0 | Status: DISCONTINUED | OUTPATIENT
Start: 2019-10-09 | End: 2019-10-09

## 2019-10-09 RX ORDER — POTASSIUM CHLORIDE 20 MEQ
20 PACKET (EA) ORAL ONCE
Refills: 0 | Status: COMPLETED | OUTPATIENT
Start: 2019-10-09 | End: 2019-10-09

## 2019-10-09 RX ORDER — MORPHINE SULFATE 50 MG/1
1 CAPSULE, EXTENDED RELEASE ORAL ONCE
Refills: 0 | Status: DISCONTINUED | OUTPATIENT
Start: 2019-10-09 | End: 2019-10-09

## 2019-10-09 RX ORDER — POTASSIUM CHLORIDE 20 MEQ
40 PACKET (EA) ORAL EVERY 4 HOURS
Refills: 0 | Status: DISCONTINUED | OUTPATIENT
Start: 2019-10-09 | End: 2019-10-10

## 2019-10-09 RX ORDER — INSULIN GLARGINE 100 [IU]/ML
20 INJECTION, SOLUTION SUBCUTANEOUS AT BEDTIME
Refills: 0 | Status: DISCONTINUED | OUTPATIENT
Start: 2019-10-09 | End: 2019-10-10

## 2019-10-09 RX ADMIN — OXYCODONE HYDROCHLORIDE 5 MILLIGRAM(S): 5 TABLET ORAL at 21:45

## 2019-10-09 RX ADMIN — Medication 81 MILLIGRAM(S): at 11:46

## 2019-10-09 RX ADMIN — CEFEPIME 100 MILLIGRAM(S): 1 INJECTION, POWDER, FOR SOLUTION INTRAMUSCULAR; INTRAVENOUS at 12:33

## 2019-10-09 RX ADMIN — Medication 2: at 11:46

## 2019-10-09 RX ADMIN — OXYCODONE HYDROCHLORIDE 5 MILLIGRAM(S): 5 TABLET ORAL at 20:46

## 2019-10-09 RX ADMIN — Medication 25 MILLIGRAM(S): at 05:22

## 2019-10-09 RX ADMIN — HEPARIN SODIUM 0 UNIT(S)/HR: 5000 INJECTION INTRAVENOUS; SUBCUTANEOUS at 00:30

## 2019-10-09 RX ADMIN — Medication 250 MILLIGRAM(S): at 09:25

## 2019-10-09 RX ADMIN — Medication 100 MILLIGRAM(S): at 17:06

## 2019-10-09 RX ADMIN — Medication 100 MILLIGRAM(S): at 05:22

## 2019-10-09 RX ADMIN — POLYETHYLENE GLYCOL 3350 17 GRAM(S): 17 POWDER, FOR SOLUTION ORAL at 11:46

## 2019-10-09 RX ADMIN — CLOPIDOGREL BISULFATE 75 MILLIGRAM(S): 75 TABLET, FILM COATED ORAL at 11:45

## 2019-10-09 RX ADMIN — HEPARIN SODIUM 300 UNIT(S)/HR: 5000 INJECTION INTRAVENOUS; SUBCUTANEOUS at 23:57

## 2019-10-09 RX ADMIN — HEPARIN SODIUM 450 UNIT(S)/HR: 5000 INJECTION INTRAVENOUS; SUBCUTANEOUS at 01:32

## 2019-10-09 RX ADMIN — Medication 100 MILLIEQUIVALENT(S): at 08:09

## 2019-10-09 RX ADMIN — Medication 650 MILLIGRAM(S): at 05:22

## 2019-10-09 RX ADMIN — BUMETANIDE 10 MG/HR: 0.25 INJECTION INTRAMUSCULAR; INTRAVENOUS at 05:22

## 2019-10-09 RX ADMIN — Medication 100 MILLIEQUIVALENT(S): at 06:17

## 2019-10-09 RX ADMIN — PANTOPRAZOLE SODIUM 40 MILLIGRAM(S): 20 TABLET, DELAYED RELEASE ORAL at 05:22

## 2019-10-09 RX ADMIN — Medication 100 MILLIEQUIVALENT(S): at 00:31

## 2019-10-09 RX ADMIN — BUMETANIDE 5 MG/HR: 0.25 INJECTION INTRAMUSCULAR; INTRAVENOUS at 12:10

## 2019-10-09 RX ADMIN — CHLORHEXIDINE GLUCONATE 1 APPLICATION(S): 213 SOLUTION TOPICAL at 05:22

## 2019-10-09 RX ADMIN — Medication 2: at 17:07

## 2019-10-09 RX ADMIN — Medication 7.5 MICROGRAM(S)/MIN: at 19:29

## 2019-10-09 RX ADMIN — HEPARIN SODIUM 300 UNIT(S)/HR: 5000 INJECTION INTRAVENOUS; SUBCUTANEOUS at 17:08

## 2019-10-09 RX ADMIN — MORPHINE SULFATE 1 MILLIGRAM(S): 50 CAPSULE, EXTENDED RELEASE ORAL at 08:08

## 2019-10-09 RX ADMIN — Medication 7.5 MICROGRAM(S)/MIN: at 12:16

## 2019-10-09 RX ADMIN — HEPARIN SODIUM 450 UNIT(S)/HR: 5000 INJECTION INTRAVENOUS; SUBCUTANEOUS at 09:26

## 2019-10-09 RX ADMIN — Medication 50 MICROGRAM(S): at 05:22

## 2019-10-09 RX ADMIN — INSULIN GLARGINE 20 UNIT(S): 100 INJECTION, SOLUTION SUBCUTANEOUS at 22:47

## 2019-10-09 RX ADMIN — Medication 4: at 22:04

## 2019-10-09 RX ADMIN — PIPERACILLIN AND TAZOBACTAM 25 GRAM(S): 4; .5 INJECTION, POWDER, LYOPHILIZED, FOR SOLUTION INTRAVENOUS at 01:39

## 2019-10-09 RX ADMIN — Medication 4: at 08:23

## 2019-10-09 RX ADMIN — MORPHINE SULFATE 1 MILLIGRAM(S): 50 CAPSULE, EXTENDED RELEASE ORAL at 08:23

## 2019-10-09 RX ADMIN — HEPARIN SODIUM 0 UNIT(S)/HR: 5000 INJECTION INTRAVENOUS; SUBCUTANEOUS at 16:07

## 2019-10-09 RX ADMIN — MORPHINE SULFATE 1 MILLIGRAM(S): 50 CAPSULE, EXTENDED RELEASE ORAL at 00:23

## 2019-10-09 RX ADMIN — SENNA PLUS 2 TABLET(S): 8.6 TABLET ORAL at 21:45

## 2019-10-09 NOTE — PROGRESS NOTE ADULT - SUBJECTIVE AND OBJECTIVE BOX
====================  CCU MIDNIGHT ROUNDS  ====================    SELVIN CLAIRE  [id number]  Patient is a 73y old  Female who presents with a chief complaint of Acute decompensated heart failure, SOB (09 Oct 2019 20:38)    ====================  SUMMARY: 74 y/o F w/ PMH of HTN, HLD, DM2, GERD, CAD s/p CABG (LIMA to LAD, SVG to OM, SVG to PDA; 2014), severe MR s/p MitraClip, severe pHTN, CKD4, hypoTH c/o dyspnea x 2D suspect 2/2  NSTEMI +/- ADHF w/ e/o organ hypoperfusion.  ====================        ====================  NEW EVENTS: Patient seen and examined at bedside. Vital signs stable overnight. Patient denies headache, dizziness, chest/abd pain, shortness of breath. ROS negative unless otherwise stated.   ====================        ====================  Vital Signs (last 12 hrs):  ====================    T(C): 36.6 (10-09-19 @ 19:00), Max: 36.7 (10-09-19 @ 11:00)  T(F): 97.9 (10-09-19 @ 19:00), Max: 98.1 (10-09-19 @ 11:00)  HR: 74 (10-09-19 @ 21:00) (64 - 74)  BP: --  BP(mean): --  ABP: --  ABP(mean): --  RR: 32 (10-09-19 @ 21:00) (16 - 32)  SpO2: 95% (10-09-19 @ 21:00) (94% - 100%)  Wt(kg): --  CVP(mm Hg): 4 (10-09-19 @ 21:00) (-2 - 7)  CVP(cm H2O): --  CO: --  CI: --  PA: 49/12 (10-09-19 @ 21:00) (34/6 - 56/21)  PA(mean): 25 (10-09-19 @ 21:00) (15 - 34)  PCWP: --  SVR: --  PVR: --    I&O's Summary    08 Oct 2019 07:01  -  09 Oct 2019 07:00  --------------------------------------------------------  IN: 698 mL / OUT: 4575 mL / NET: -3877 mL    09 Oct 2019 07:01  -  09 Oct 2019 22:05  --------------------------------------------------------  IN: 645 mL / OUT: 2600 mL / NET: -1955 mL            ====================  NEW LABS:  ====================                        8.2    13.06 )-----------( 341      ( 09 Oct 2019 05:00 )             27.3     10-09    138  |  100  |  105<H>  ----------------------------<  229<H>  3.6   |  19<L>  |  2.90<H>    Ca    8.4      09 Oct 2019 05:00  Phos  5.0     10-09  Mg     2.6     10-09    TPro  7.2  /  Alb  3.4  /  TBili  0.7  /  DBili  x   /  AST  2726<H>  /  ALT  2168<H>  /  AlkPhos  153<H>  10-09    PT/INR - ( 09 Oct 2019 05:00 )   PT: 19.2 sec;   INR: 1.66 ratio         PTT - ( 09 Oct 2019 15:15 )  PTT:93.8 sec      ABG - ( 09 Oct 2019 04:54 )  pH, Arterial: 7.46  pH, Blood: x     /  pCO2: 31    /  pO2: 116   / HCO3: 21    / Base Excess: -1.6  /  SaO2: 98                Blood Gas Source, Mixed: Mixed Blood Gas (10-09-19 @ 04:54)  Blood Gas Source, Mixed: Mixed Blood Gas (10-08-19 @ 22:48)      ====================  Assessment & Plan:    #CV  Vessels - Elev troponin suspect possible NSTEMI. C/w ASA/clopidogrel. C/w hep gtt. C/w vol optimize. S/p IABP to improve coronary perfusion. Plan for LHC when Cr improves. LORENE score is 185. Would avoid hydral 2/2 possible steal; c/w nitro gtt.  Pump - Severe reduced LVEF. Bumetanide gtt d/c'ed. Oscar in place. Strict Is/Os. PA Cath in place; serial perfusion labs.  Valves - S/p MitraClip. No active issues.  Rhythm - C/w monitor.    #Neuro - No active issues  #Resp - Off supplemental O2, sating well on RA. Vol optimize, as above.  #GI - Heart healthy diet. Trend liver tests  #Endo - Trend BGFS; cover PRN. May need insulin gtt if runs consistently hyperglycemic. C/w home med Synthroid. Check TFTs. Get endo c/s.  #Renal - Vol optimize, as above. MERVIN likely 2/2 acute congestion, improving.  #ID - Leukocytosis. Sepsis w/u unremarkable thus far; c/w Cefepime given MERVIN.  #Hem - Full dose heparin, as above.  #Ethics - FC. C/w care in CCU    José Daniels MD PGY-2  Department of Internal Medicine  Pager: [phone] ([facility]) /[phone] ([facility])   ====================

## 2019-10-09 NOTE — PROGRESS NOTE ADULT - ASSESSMENT
74 yo F w/ PMH of CKD stage 4 (baseline Cr 1.9-2.2) HTN, T2DM, CAD s/p CABG X3 (2014 at [facility]), non-dilated ICM (EF 20-25%), severe mitral regurgitation s/p mitral clip (6/13/19), severe pulm HTN, hypothyroidism who presented for evaluation of chest pain, cough, and SOB for the past 2 days and was admitted for ADHF. s/p IABP 10/8/19. Nephrology is following for MERVIN    MERVIN on CKD stage 4  - baseline Cr 1.9-2.2; has had recurrent MERVIN's over the years  - CKD is likely 2/2 recurrent MERVIN's + underlying DM/HTN  - MERVIN is likely 2/2 CRS     - admitted w/ cr 3; now 2.9  - continue Bumex drip     - renal sonogram w/ parenchymal changes, no hydro, no stones, left simple renal cyst.  - Avoid nephrotoxins including NSAIDs, IV contrast (if possible), Fleet's products  - maintain MAP >65  - monitor I/O's accurately  - net -4L in past 24 hrs    - vanco trough 30.2 today  - hold vanco for today  - redose vanco if trough <15    Metabolic acidosis  - 2/2 lactic acidosis 2/2 hypoperfusion/hypotension/dec cardiac output  - lactic acidosis improved w/ improved perfusion on IABP  - monitor    Hypotension  - likely from dec cardiac output and hypoperfusion  - hold anti-hypertensives (hydralazine, imdur, spironolactone)  - beta blocker per cardio recs  - continue bumex drip but monitor BP closely  - improved w/ IABP    Proteinuria/Hematuria  - likely from underlying DM  - Hep C neg in 2017  - check spot protein/Cr, Hep B (sAg, cAb, sAb, eAg), HIV, syphilis, SPEP, UPEP, serum immunofixation, ANCA, C3 and C4 complement levels, DEAN, rheumatoid factor, cryoglobulin

## 2019-10-09 NOTE — PROGRESS NOTE ADULT - SUBJECTIVE AND OBJECTIVE BOX
CHIEF COMPLAINT:    SUBJECTIVE:     REVIEW OF SYSTEMS:    CONSTITUTIONAL: (  )  weakness,  (  ) fevers or chills  EYES/ENT: (  )visual changes;     NECK: (  ) pain or stiffness  RESPIRATORY:   (  )cough, wheezing, hemoptysis;  (  ) shortness of breath  CARDIOVASCULAR:  (  )chest pain or palpitations  GASTROINTESTINAL:   (  )abdominal or epigastric pain.  (  ) nausea, vomiting, or hematemesis;   (   ) diarrhea or constipation.   GENITOURINARY:   (    ) dysuria, frequency or hematuria  NEUROLOGICAL:  (   ) numbness or weakness   All other review of systems is negative unless indicated above    Vital Signs Last 24 Hrs  T(C): 36.6 (09 Oct 2019 19:00), Max: 37.2 (08 Oct 2019 21:00)  T(F): 97.9 (09 Oct 2019 19:00), Max: 99 (08 Oct 2019 21:00)  HR: 68 (09 Oct 2019 20:00) (64 - 82)  BP: --  BP(mean): --  RR: 31 (09 Oct 2019 20:00) (16 - 39)  SpO2: 95% (09 Oct 2019 20:00) (94% - 100%)    I&O's Summary    08 Oct 2019 07:01  -  09 Oct 2019 07:00  --------------------------------------------------------  IN: 698 mL / OUT: 4575 mL / NET: -3877 mL    09 Oct 2019 07:01  -  09 Oct 2019 20:39  --------------------------------------------------------  IN: 634.5 mL / OUT: 2475 mL / NET: -1840.5 mL        CAPILLARY BLOOD GLUCOSE      POCT Blood Glucose.: 199 mg/dL (09 Oct 2019 16:54)  POCT Blood Glucose.: 198 mg/dL (09 Oct 2019 11:39)  POCT Blood Glucose.: 221 mg/dL (09 Oct 2019 08:14)      PHYSICAL EXAM:    Constitutional:  (   ) NAD,   (   )awake and alert  HEENT: PERR, EOMI,    Neck: Soft and supple, No LAD, No JVD  Respiratory:  (    Breath sounds are clear bilaterally,    (   ) wheezing, rales or rhonchi  Cardiovascular:     (   )S1 and S2, regular rate and rhythm, no Murmurs, gallops or rubs  Gastrointestinal:  (   )Bowel Sounds present, soft,   (  )nontender, nondistended,    Extremities:    (  ) peripheral edema  Vascular: 2+ peripheral pulses  Neurological:    (    )A/O x 3,   (  ) focal deficits  Musculoskeletal:    (   )  normal strength b/l upper  (     ) normal  lower extremities  Skin: No rashes    MEDICATIONS:  MEDICATIONS  (STANDING):  aspirin enteric coated 81 milliGRAM(s) Oral daily  cefepime   IVPB 1000 milliGRAM(s) IV Intermittent every 24 hours  chlorhexidine 2% Cloths 1 Application(s) Topical <User Schedule>  clopidogrel Tablet 75 milliGRAM(s) Oral daily  dextrose 5%. 1000 milliLiter(s) (50 mL/Hr) IV Continuous <Continuous>  dextrose 50% Injectable 12.5 Gram(s) IV Push once  dextrose 50% Injectable 25 Gram(s) IV Push once  dextrose 50% Injectable 25 Gram(s) IV Push once  docusate sodium 100 milliGRAM(s) Oral two times a day  heparin  Infusion.  Unit(s)/Hr (6.5 mL/Hr) IV Continuous <Continuous>  insulin glargine Injectable (LANTUS) 10 Unit(s) SubCutaneous at bedtime  insulin lispro (HumaLOG) corrective regimen sliding scale   SubCutaneous three times a day before meals  insulin lispro Injectable (HumaLOG) 3 Unit(s) SubCutaneous three times a day before meals  levothyroxine 50 MICROGram(s) Oral daily  nitroglycerin  Infusion 25 MICROgram(s)/Min (7.5 mL/Hr) IV Continuous <Continuous>  oxyCODONE    IR 5 milliGRAM(s) Oral once  pantoprazole    Tablet 40 milliGRAM(s) Oral before breakfast  polyethylene glycol 3350 17 Gram(s) Oral daily  senna 2 Tablet(s) Oral at bedtime      LABS: All Labs Reviewed:                        8.2    13.06 )-----------( 341      ( 09 Oct 2019 05:00 )             27.3     10-09    138  |  100  |  105<H>  ----------------------------<  229<H>  3.6   |  19<L>  |  2.90<H>    Ca    8.4      09 Oct 2019 05:00  Phos  5.0     10-09  Mg     2.6     10-09    TPro  7.2  /  Alb  3.4  /  TBili  0.7  /  DBili  x   /  AST  2726<H>  /  ALT  2168<H>  /  AlkPhos  153<H>  10-09    PT/INR - ( 09 Oct 2019 05:00 )   PT: 19.2 sec;   INR: 1.66 ratio         PTT - ( 09 Oct 2019 15:15 )  PTT:93.8 sec  CARDIAC MARKERS ( 08 Oct 2019 14:01 )  x     / x     / 149 U/L / x     / 11.1 ng/mL  CARDIAC MARKERS ( 08 Oct 2019 09:36 )  x     / x     / 169 U/L / x     / 12.1 ng/mL      Blood Culture: 10-08 @ 14:47  Organism --  Gram Stain Blood -- Gram Stain --  Specimen Source .Blood  Culture-Blood --    10-08 @ 05:03  Organism --  Gram Stain Blood -- Gram Stain --  Specimen Source .Urine  Culture-Blood --    10-08 @ 01:44  Organism --  Gram Stain Blood -- Gram Stain --  Specimen Source .Blood  Culture-Blood --      Urine Culture      RADIOLOGY/EKG:    ASSESSMENT AND PLAN:    DVT PPX:    ADVANCED DIRECTIVE:    DISPOSITION: CHIEF COMPLAINT: patient well known to me from previous admission her daughter-in-law at the bedside all her question answered  Patient is a 73y old  Female who presents with a chief complaint of Acute decompensated heart failure, SOB (08 Oct 2019 22:37)      INTERVAL HISTORY/OVERNIGHT EVENTS: S/p RHC and IABP 10/8. Overnight pt complained of chest pain, was given sublingual nitro and morphine 1 mg. Weaned off of BIPAP to NC. Hemodynamics overnight CO: 5.25, CI: 3.57, , CVP ~11. MVO2 64%, lactate 1.6. UOP improved w/ metolazone, remains on bumex gtt. Started hydralazine 25 mg TID. Pt seen and examined at bedside. Continues to endorse chest pain.     SUBJECTIVE:     REVIEW OF SYSTEMS:    CONSTITUTIONAL: ( x )  weakness,  (  ) fevers or chills  EYES/ENT: (  )visual changes;     NECK: (  ) pain or stiffness  RESPIRATORY:   (  )cough, wheezing, hemoptysis;  (  ) shortness of breath  CARDIOVASCULAR:  (x  )chest pain or palpitations  GASTROINTESTINAL:   (  )abdominal or epigastric pain.  (  ) nausea, vomiting, or hematemesis;   (   ) diarrhea or constipation.   GENITOURINARY:   (    ) dysuria, frequency or hematuria  NEUROLOGICAL:  (   ) numbness or weakness   All other review of systems is negative unless indicated above    Vital Signs Last 24 Hrs  T(C): 36.6 (09 Oct 2019 19:00), Max: 37.2 (08 Oct 2019 21:00)  T(F): 97.9 (09 Oct 2019 19:00), Max: 99 (08 Oct 2019 21:00)  HR: 68 (09 Oct 2019 20:00) (64 - 82)  BP: --  BP(mean): --  RR: 31 (09 Oct 2019 20:00) (16 - 39)  SpO2: 95% (09 Oct 2019 20:00) (94% - 100%)    I&O's Summary    08 Oct 2019 07:01  -  09 Oct 2019 07:00  --------------------------------------------------------  IN: 698 mL / OUT: 4575 mL / NET: -3877 mL    09 Oct 2019 07:01  -  09 Oct 2019 20:39  --------------------------------------------------------  IN: 634.5 mL / OUT: 2475 mL / NET: -1840.5 mL        CAPILLARY BLOOD GLUCOSE      POCT Blood Glucose.: 199 mg/dL (09 Oct 2019 16:54)  POCT Blood Glucose.: 198 mg/dL (09 Oct 2019 11:39)  POCT Blood Glucose.: 221 mg/dL (09 Oct 2019 08:14)      PHYSICAL EXAM:    Constitutional:  (   ) NAD,   ( x  )awake  ill-appearing  HEENT: PERR, EOMI,    Neck: Soft and supple, No LAD, No JVD  Respiratory:  (  x  Breath sounds are clear bilaterally,    (   ) wheezing, rales or rhonchi  Cardiovascular:     (   )xS1 and S2, regular rate and rhythm, no Murmurs, gallops or rubs  Gastrointestinal:  (  x )Bowel Sounds present, soft,   (  )nontender, nondistended,    Extremities:    (  ) peripheral edema  Vascular: 2+ peripheral pulses  Neurological:    (  x  )A/O x 2Home was   (  ) focal deficits  Musculoskeletal:    ( x  )  normal strength b/l upper  (     ) normal  lower extremities  Skin: No rashes    MEDICATIONS:  MEDICATIONS  (STANDING):  aspirin enteric coated 81 milliGRAM(s) Oral daily  cefepime   IVPB 1000 milliGRAM(s) IV Intermittent every 24 hours  chlorhexidine 2% Cloths 1 Application(s) Topical <User Schedule>  clopidogrel Tablet 75 milliGRAM(s) Oral daily  dextrose 5%. 1000 milliLiter(s) (50 mL/Hr) IV Continuous <Continuous>  dextrose 50% Injectable 12.5 Gram(s) IV Push once  dextrose 50% Injectable 25 Gram(s) IV Push once  dextrose 50% Injectable 25 Gram(s) IV Push once  docusate sodium 100 milliGRAM(s) Oral two times a day  heparin  Infusion.  Unit(s)/Hr (6.5 mL/Hr) IV Continuous <Continuous>  insulin glargine Injectable (LANTUS) 10 Unit(s) SubCutaneous at bedtime  insulin lispro (HumaLOG) corrective regimen sliding scale   SubCutaneous three times a day before meals  insulin lispro Injectable (HumaLOG) 3 Unit(s) SubCutaneous three times a day before meals  levothyroxine 50 MICROGram(s) Oral daily  nitroglycerin  Infusion 25 MICROgram(s)/Min (7.5 mL/Hr) IV Continuous <Continuous>  oxyCODONE    IR 5 milliGRAM(s) Oral once  pantoprazole    Tablet 40 milliGRAM(s) Oral before breakfast  polyethylene glycol 3350 17 Gram(s) Oral daily  senna 2 Tablet(s) Oral at bedtime      LABS: All Labs Reviewed:                        8.2    13.06 )-----------( 341      ( 09 Oct 2019 05:00 )             27.3     10-09    138  |  100  |  105<H>  ----------------------------<  229<H>  3.6   |  19<L>  |  2.90<H>    Ca    8.4      09 Oct 2019 05:00  Phos  5.0     10-09  Mg     2.6     10-09    TPro  7.2  /  Alb  3.4  /  TBili  0.7  /  DBili  x   /  AST  2726<H>  /  ALT  2168<H>  /  AlkPhos  153<H>  10-09    PT/INR - ( 09 Oct 2019 05:00 )   PT: 19.2 sec;   INR: 1.66 ratio         PTT - ( 09 Oct 2019 15:15 )  PTT:93.8 sec  CARDIAC MARKERS ( 08 Oct 2019 14:01 )  x     / x     / 149 U/L / x     / 11.1 ng/mL  CARDIAC MARKERS ( 08 Oct 2019 09:36 )  x     / x     / 169 U/L / x     / 12.1 ng/mL      Blood Culture: 10-08 @ 14:47  Organism --  Gram Stain Blood -- Gram Stain --  Specimen Source .Blood  Culture-Blood --    10-08 @ 05:03  Organism --  Gram Stain Blood -- Gram Stain --  Specimen Source .Urine  Culture-Blood --    10-08 @ 01:44  Organism --  Gram Stain Blood -- Gram Stain --  Specimen Source .Blood  Culture-Blood --      Urine Culture      RADIOLOGY/EKG:  < from: TTE with Doppler (w/Cont) (10.08.19 @ 09:03) >  Conclusions:  Endocardial visualization enhanced with intravenous  injection of Ultrasonic Enhancing Agent (Definity).  Severely reduced left ventricular systolic function.  Moderate right ventricular enlargement. Right ventricular  systolic function is at least moderately redcued.  s/p MitraClip.  Moderate pulmonary hypertension.  ASSESSMENT AND PLAN:  73 YOF with PMHx of HTN, HLD, T2DM, GERD, CAD s/p CABG (LIMA to LAD, SVG to OM, SVG to PDA 2014 at [facility]), severe mitral regurgitation s/p mitral clip, severe pulmonary HTN, CKD IV, hypothyroidism presenting with SOB likely 2/2 ADHF. s/p IABP 10/8. On NSTEMI treatment.     #Neuro  -NAIs    #CV  -c/w asa/plavix. heparin gtt 10/8-  -hydralazine 25 mg TID discontinued   - off Bumex drip  -monitor I&Os, daily weights.  -lactate downtrending 12.9>>2.2. Creatinine stable since admission, 2.90.   -LHC after improving renal function  #Pulm  -currently sating well on 2L NC, lactate downtrending     #GI  -c/w protonix 40mg qD  -transaminitis in the setting of congestive hepatopathy, downtrending  -trend CMP daily--holding statin at this time     #Heme/onc  -H/H in 8 range, down from admission however stable   -active T&S    #Endo  -seen by endocrinology at previous admission, told to take levemir 82 U qAM and 90 U qPM. humalog 64U premeals TID  -s/p insulin gtt, now Lantus 10 U QHS, Humalog 3 units TID, moderate dose sliding scale    -levothyroxine 50mcg  -trend finger sticks     #ID  -Leukocytosis. UA/UCx negative. BCxs negative x 2. CXR w/ infiltrates likely 2/2 edema. Procal elev. Ustrep/legionella pending. s/p vanc/zosyn. now on cefepime 1 g Q24h for renal dosing.     #Renal  -SCr 2.90, baseline 1.9-2.0  -s/p sodium bicarb 650mg TID- d/c'd  -most likely 2/2 cardiorenal  -trend BMP daily, avoid nephrotoxic agents    #DVT ppx  -heparin subQ  DVT PPX:    ADVANCED DIRECTIVE:    DISPOSITION:

## 2019-10-09 NOTE — PROGRESS NOTE ADULT - ASSESSMENT
73 YOF with PMHx of HTN, HLD, T2DM, GERD, CAD s/p CABG (LIMA to LAD, SVG to OM, SVG to PDA 2014 at [facility]), severe mitral regurgitation s/p mitral clip, severe pulmonary HTN, CKD IV, hypothyroidism presenting with SOB likely 2/2 ADHF. s/p IABP 10/8    #Neuro  -NAIs    #CV  -c/w asa/plavix. heparin gtt 10/8-  -c/w hydralazine 25 mg TID   -s/p bumex 4mg IVP x1, currently on bumex gtt at 2  -BNP 26k on admission >> 16,667. trop T 1842 >> 1493, lactate 12.9 >> 5.8  -monitor I&Os, daily weights. currently with good UOP, NET -4L.   -lactate downtrending, 10.7>>2.2. Creatinine stable since admission, 2.90.   -LHC once MERVIN resolves     #Pulm  -currently on NC, lactate downtrending     #GI  -c/w protonix 40mg qD  -transaminitis in the setting of ADHF, downtrending  -trend CMP daily--holding statin at this time     #Heme/onc  -H/H in 8 range, down from admission however stable   -active T&S    #Endo  -seen by endocrinology at previous admission, told to take levemir 82 U qAM and 90 U qPM. humalog 64U premeals TID  -s/p insulin gtt, on sliding scale for now   -pending endocrine recs  -levothyroxine 50mcg    #ID  -Leukocytosis. UA unremarkable. Bcxs x 2. CXR w/ infiltrates likely 2/2 edema. Procal elev. Ustrep/legionella pending. on vanc/zosyn 10/8- renally dosed, can taper off if sepsis w/u unremarkable    #Renal  -BUN 82/SCr 2.73, baseline SCr 1.9-2.0  -c/w sodium bicarb 650mg TID  -most likely 2/2 cardiorenal  -trend BMP daily, avoid nephrotoxic agents    #DVT ppx  -heparin subQ 73 YOF with PMHx of HTN, HLD, T2DM, GERD, CAD s/p CABG (LIMA to LAD, SVG to OM, SVG to PDA 2014 at [facility]), severe mitral regurgitation s/p mitral clip, severe pulmonary HTN, CKD IV, hypothyroidism presenting with SOB likely 2/2 ADHF. s/p IABP 10/8    #Neuro  -NAIs    #CV  -c/w asa/plavix. heparin gtt 10/8-  -c/w hydralazine 25 mg TID   -s/p bumex 4mg IVP x1, currently on bumex gtt at 2  -monitor I&Os, daily weights. currently with good UOP, NET -4L.   -lactate downtrending 12.9>>2.2. Creatinine stable since admission, 2.90.   -C once MERVIN resolves     #Pulm  -currently on NC, lactate downtrending     #GI  -c/w protonix 40mg qD  -transaminitis in the setting of ADHF, downtrending  -trend CMP daily--holding statin at this time     #Heme/onc  -H/H in 8 range, down from admission however stable   -active T&S    #Endo  -seen by endocrinology at previous admission, told to take levemir 82 U qAM and 90 U qPM. humalog 64U premeals TID  -s/p insulin gtt, on sliding scale for now   -pending endocrine recs  -levothyroxine 50mcg    #ID  -Leukocytosis. UA unremarkable. Bcxs x 2. CXR w/ infiltrates likely 2/2 edema. Procal elev. Ustrep/legionella pending. on vanc/zosyn 10/8- renally dosed, can taper off if sepsis w/u unremarkable    #Renal  -BUN 82/SCr 2.73, baseline SCr 1.9-2.0  -c/w sodium bicarb 650mg TID  -most likely 2/2 cardiorenal  -trend BMP daily, avoid nephrotoxic agents    #DVT ppx  -heparin subQ 73 YOF with PMHx of HTN, HLD, T2DM, GERD, CAD s/p CABG (LIMA to LAD, SVG to OM, SVG to PDA 2014 at [facility] severe mitral regurgitation s/p mitral clip, severe pulmonary HTN, CKD IV, hypothyroidism presenting with SOB likely 2/2 ADHF. s/p IABP 10/8    #Neuro  -NAIs    #CV  -c/w asa/plavix. heparin gtt 10/8-  -c/w hydralazine 25 mg TID   -s/p bumex 4mg IVP x1, currently on bumex gtt at 2  -monitor I&Os, daily weights. currently with good UOP, NET -4L.   -lactate downtrending 12.9>>2.2. Creatinine stable since admission, 2.90.   -LHC once MERVIN resolves     #Pulm  -currently sating well on 2L NC, lactate downtrending     #GI  -c/w protonix 40mg qD  -transaminitis in the setting of ADHF, downtrending  -trend CMP daily--holding statin at this time     #Heme/onc  -H/H in 8 range, down from admission however stable   -active T&S    #Endo  -seen by endocrinology at previous admission, told to take levemir 82 U qAM and 90 U qPM. humalog 64U premeals TID  -s/p insulin gtt, on sliding scale for now   -pending endocrine recs  -levothyroxine 50mcg    #ID  -Leukocytosis. UA unremarkable. Bcxs x 2. CXR w/ infiltrates likely 2/2 edema. Procal elev. Ustrep/legionella pending. on vanc/zosyn 10/8- renally dosed    #Renal  -BUN 82/SCr 2.73, baseline SCr 1.9-2.0  -c/w sodium bicarb 650mg TID  -most likely 2/2 cardiorenal  -trend BMP daily, avoid nephrotoxic agents    #DVT ppx  -heparin subQ 73 YOF with PMHx of HTN, HLD, T2DM, GERD, CAD s/p CABG (LIMA to LAD, SVG to OM, SVG to PDA 2014 at [facility]), severe mitral regurgitation s/p mitral clip, severe pulmonary HTN, CKD IV, hypothyroidism presenting with SOB likely 2/2 ADHF. s/p IABP 10/8    #Neuro  -NAIs    #CV  -c/w asa/plavix. heparin gtt 10/8-  -c/w hydralazine 25 mg TID   -s/p bumex 4mg IVP x1, currently on bumex gtt at 2  -monitor I&Os, daily weights. currently with good UOP, NET -4L.   -lactate downtrending 12.9>>2.2. Creatinine stable since admission, 2.90.   -LHC once MERVIN resolves     #Pulm  -currently sating well on 2L NC, lactate downtrending     #GI  -c/w protonix 40mg qD  -transaminitis in the setting of ADHF, downtrending  -trend CMP daily--holding statin at this time     #Heme/onc  -H/H in 8 range, down from admission however stable   -active T&S    #Endo  -seen by endocrinology at previous admission, told to take levemir 82 U qAM and 90 U qPM. humalog 64U premeals TID  -s/p insulin gtt, on sliding scale for now   -pending endocrine recs  -levothyroxine 50mcg    #ID  -Leukocytosis. UA/UCx negative. BCxs negative x 2. CXR w/ infiltrates likely 2/2 edema. Procal elev. Ustrep/legionella pending. on vanc/zosyn 10/8- renally dosed    #Renal  -SCr 2.90, baseline 1.9-2.0  -c/w sodium bicarb 650mg TID  -most likely 2/2 cardiorenal  -trend BMP daily, avoid nephrotoxic agents    #DVT ppx  -heparin subQ 73 YOF with PMHx of HTN, HLD, T2DM, GERD, CAD s/p CABG (LIMA to LAD, SVG to OM, SVG to PDA 2014 at [facility]), severe mitral regurgitation s/p mitral clip, severe pulmonary HTN, CKD IV, hypothyroidism presenting with SOB likely 2/2 ADHF. s/p IABP 10/8    #Neuro  -NAIs    #CV  -c/w asa/plavix. heparin gtt 10/8-  -hydralazine 25 mg TID discontinued   -s/p bumex drip, with good UOP, NET -4L.   -monitor I&Os, daily weights.  -lactate downtrending 12.9>>2.2. Creatinine stable since admission, 2.90.   -[facility] once renal function improves      #Pulm  -currently sating well on 2L NC, lactate downtrending     #GI  -c/w protonix 40mg qD  -transaminitis in the setting of ADHF, downtrending  -trend CMP daily--holding statin at this time     #Heme/onc  -H/H in 8 range, down from admission however stable   -active T&S    #Endo  -seen by endocrinology at previous admission, told to take levemir 82 U qAM and 90 U qPM. humalog 64U premeals TID  -s/p insulin gtt, now on sliding scale and NPH   -levothyroxine 50mcg  -trend finger sticks     #ID  -Leukocytosis. UA/UCx negative. BCxs negative x 2. CXR w/ infiltrates likely 2/2 edema. Procal elev. Ustrep/legionella pending. s/p vanc/zosyn. now on cefepime 1 g Q24h for renal dosing.     #Renal  -SCr 2.90, baseline 1.9-2.0  -s/p sodium bicarb 650mg TID- d/c'd  -most likely 2/2 cardiorenal  -trend BMP daily, avoid nephrotoxic agents    #DVT ppx  -heparin subQ 73 YOF with PMHx of HTN, HLD, T2DM, GERD, CAD s/p CABG (LIMA to LAD, SVG to OM, SVG to PDA 2014 at [facility]), severe mitral regurgitation s/p mitral clip, severe pulmonary HTN, CKD IV, hypothyroidism presenting with SOB likely 2/2 ADHF. s/p IABP 10/8    #Neuro  -NAIs    #CV  -c/w asa/plavix. heparin gtt 10/8-  -hydralazine 25 mg TID discontinued   -s/p bumex drip, with good UOP, NET -4L.   -monitor I&Os, daily weights.  -lactate downtrending 12.9>>2.2. Creatinine stable since admission, 2.90.   -[facility] once renal function improves      #Pulm  -currently sating well on 2L NC, lactate downtrending     #GI  -c/w protonix 40mg qD  -transaminitis in the setting of ADHF, downtrending  -trend CMP daily--holding statin at this time     #Heme/onc  -H/H in 8 range, down from admission however stable   -active T&S    #Endo  -seen by endocrinology at previous admission, told to take levemir 82 U qAM and 90 U qPM. humalog 64U premeals TID  -s/p insulin gtt, now Lantus 10 U QHS, Humalog 3 units TID, moderate dose sliding scale    -levothyroxine 50mcg  -trend finger sticks     #ID  -Leukocytosis. UA/UCx negative. BCxs negative x 2. CXR w/ infiltrates likely 2/2 edema. Procal elev. Ustrep/legionella pending. s/p vanc/zosyn. now on cefepime 1 g Q24h for renal dosing.     #Renal  -SCr 2.90, baseline 1.9-2.0  -s/p sodium bicarb 650mg TID- d/c'd  -most likely 2/2 cardiorenal  -trend BMP daily, avoid nephrotoxic agents    #DVT ppx  -heparin subQ 73 YOF with PMHx of HTN, HLD, T2DM, GERD, CAD s/p CABG (LIMA to LAD, SVG to OM, SVG to PDA 2014 at [facility]), severe mitral regurgitation s/p mitral clip, severe pulmonary HTN, CKD IV, hypothyroidism presenting with SOB likely 2/2 ADHF. s/p IABP 10/8. On NSTEMI treatment.     #Neuro  -NAIs    #CV  -c/w asa/plavix. heparin gtt 10/8-  -hydralazine 25 mg TID discontinued   -remains on bumex drip now @ 1 mg/hour with good UOP, NET -4L.   -monitor I&Os, daily weights.  -lactate downtrending 12.9>>2.2. Creatinine stable since admission, 2.90.   -[facility] once renal function improves      #Pulm  -currently sating well on 2L NC, lactate downtrending     #GI  -c/w protonix 40mg qD  -transaminitis in the setting of congestive hepatopathy, downtrending  -trend CMP daily--holding statin at this time     #Heme/onc  -H/H in 8 range, down from admission however stable   -active T&S    #Endo  -seen by endocrinology at previous admission, told to take levemir 82 U qAM and 90 U qPM. humalog 64U premeals TID  -s/p insulin gtt, now Lantus 10 U QHS, Humalog 3 units TID, moderate dose sliding scale    -levothyroxine 50mcg  -trend finger sticks     #ID  -Leukocytosis. UA/UCx negative. BCxs negative x 2. CXR w/ infiltrates likely 2/2 edema. Procal elev. Ustrep/legionella pending. s/p vanc/zosyn. now on cefepime 1 g Q24h for renal dosing.     #Renal  -SCr 2.90, baseline 1.9-2.0  -s/p sodium bicarb 650mg TID- d/c'd  -most likely 2/2 cardiorenal  -trend BMP daily, avoid nephrotoxic agents    #DVT ppx  -heparin subQ

## 2019-10-09 NOTE — PROGRESS NOTE ADULT - SUBJECTIVE AND OBJECTIVE BOX
PATIENT:  SELVIN CLAIRE  [id number]    CHIEF COMPLAINT:  Patient is a 73y old  Female who presents with a chief complaint of Acute decompensated heart failure, SOB (08 Oct 2019 22:37)      INTERVAL HISTORY/OVERNIGHT EVENTS: S/p RHC and IABP 10/8. Overnight pt weaned off of BIPAP to NC. Hemodynamics overnight CO: 5.25, CI: 3.57, , CVP ~11. MVO2 64%, lactate 1.6. UOP improved w/ metolazone, remains on bumex gtt. Started hydralazine 25 mg TID. Pt seen and examined at bedside.       REVIEW OF SYSTEMS:    Constitutional:     [ ] negative [ ] fevers [ ] chills [ ] weight loss [ ] weight gain  HEENT:                  [ ] negative [ ] dry eyes [ ] eye irritation [ ] postnasal drip [ ] nasal congestion  CV:                         [ ] negative  [ ] chest pain [ ] orthopnea [ ] palpitations [ ] murmur  Resp:                     [ ] negative [ ] cough [ ] shortness of breath [ ] dyspnea [ ] wheezing [ ] sputum [ ] hemoptysis  GI:                          [ ] negative [ ] nausea [ ] vomiting [ ] diarrhea [ ] constipation [ ] abd pain [ ] dysphagia   :                        [ ] negative [ ] dysuria [ ] nocturia [ ] hematuria [ ] increased urinary frequency  Musculoskeletal: [ ] negative [ ] back pain [ ] myalgias [ ] arthralgias [ ] fracture  Skin:                       [ ] negative [ ] rash [ ] itch  Neurological:        [ ] negative [ ] headache [ ] dizziness [ ] syncope [ ] weakness [ ] numbness  Psychiatric:           [ ] negative [ ] anxiety [ ] depression  Endocrine:            [ ] negative [ ] diabetes [ ] thyroid problem  Heme/Lymph:      [ ] negative [ ] anemia [ ] bleeding problem  Allergic/Immune: [ ] negative [ ] itchy eyes [ ] nasal discharge [ ] hives [ ] angioedema    [ ] All other systems negative  [ ] Unable to assess ROS because ________.    MEDICATIONS:  MEDICATIONS  (STANDING):  aspirin enteric coated 81 milliGRAM(s) Oral daily  buMETAnide Infusion 2 mG/Hr (10 mL/Hr) IV Continuous <Continuous>  chlorhexidine 2% Cloths 1 Application(s) Topical <User Schedule>  clopidogrel Tablet 75 milliGRAM(s) Oral daily  dextrose 5%. 1000 milliLiter(s) (50 mL/Hr) IV Continuous <Continuous>  dextrose 50% Injectable 12.5 Gram(s) IV Push once  dextrose 50% Injectable 25 Gram(s) IV Push once  dextrose 50% Injectable 25 Gram(s) IV Push once  docusate sodium 100 milliGRAM(s) Oral two times a day  heparin  Infusion.  Unit(s)/Hr (6.5 mL/Hr) IV Continuous <Continuous>  hydrALAZINE 25 milliGRAM(s) Oral three times a day  insulin lispro (HumaLOG) corrective regimen sliding scale   SubCutaneous three times a day before meals  levothyroxine 50 MICROGram(s) Oral daily  pantoprazole    Tablet 40 milliGRAM(s) Oral before breakfast  piperacillin/tazobactam IVPB.. 3.375 Gram(s) IV Intermittent every 12 hours  polyethylene glycol 3350 17 Gram(s) Oral daily  potassium chloride  20 mEq/100 mL IVPB 20 milliEquivalent(s) IV Intermittent every 2 hours  senna 2 Tablet(s) Oral at bedtime  sodium bicarbonate 650 milliGRAM(s) Oral two times a day  sodium chloride 0.9%. 1000 milliLiter(s) (5 mL/Hr) IV Continuous <Continuous>  vancomycin  IVPB      vancomycin  IVPB 750 milliGRAM(s) IV Intermittent every 12 hours    MEDICATIONS  (PRN):  ALPRAZolam 0.25 milliGRAM(s) Oral three times a day PRN Anxiety  dextrose 40% Gel 15 Gram(s) Oral once PRN Blood Glucose LESS THAN 70 milliGRAM(s)/deciliter  glucagon  Injectable 1 milliGRAM(s) IntraMuscular once PRN Glucose LESS THAN 70 milligrams/deciliter  heparin  Injectable 3200 Unit(s) IV Push every 6 hours PRN For aPTT less than 40      ALLERGIES:  Allergies    azithromycin (Hives; Pruritus)    Intolerances        OBJECTIVE:  ICU Vital Signs Last 24 Hrs  T(C): 36.7 (09 Oct 2019 05:00), Max: 37.2 (08 Oct 2019 21:00)  T(F): 98 (09 Oct 2019 05:00), Max: 99 (08 Oct 2019 21:00)  HR: 70 (09 Oct 2019 06:00) (68 - 82)  BP: 90/54 (08 Oct 2019 08:00) (90/54 - 90/54)  BP(mean): 71 (08 Oct 2019 08:00) (71 - 71)  ABP: 90/28 (09 Oct 2019 06:00) (80/28 - 170/26)  ABP(mean): 62 (09 Oct 2019 06:00) (58 - 90)  RR: 34 (09 Oct 2019 06:00) (14 - 42)  SpO2: 98% (09 Oct 2019 06:00) (95% - 100%)      Adult Advanced Hemodynamics Last 24 Hrs  CVP(mm Hg): 7 (08 Oct 2019 22:00) (7 - 25)  CVP(cm H2O): --  CO: --  CI: --  PA: 41/11 (09 Oct 2019 06:00) (41/11 - 65/29)  PA(mean): 20 (09 Oct 2019 06:00) (20 - 174)  PCWP: --  SVR: --  SVRI: --  PVR: --  PVRI: --  CAPILLARY BLOOD GLUCOSE      POCT Blood Glucose.: 278 mg/dL (08 Oct 2019 07:33)    CAPILLARY BLOOD GLUCOSE      POCT Blood Glucose.: 278 mg/dL (08 Oct 2019 07:33)    I&O's Summary    08 Oct 2019 07:01  -  09 Oct 2019 07:00  --------------------------------------------------------  IN: 698 mL / OUT: 4575 mL / NET: -3877 mL      Daily     Daily     PHYSICAL EXAMINATION:  General: ill-appearing elderly woman lying in bed on BIPAP   HEENT: PERRLA, EOMI, moist mucous membranes  Neurology: unable to assess  Respiratory: bibasilar crackles   CV: RRR, S1S2, no murmurs, rubs or gallops  Abdominal: Soft, mildly distended, nontender   Extremities: trace edema to bilateral lower shins, + peripheral pulses  Tubes: +Oscar     LABS:  ABG - ( 09 Oct 2019 04:54 )  pH, Arterial: 7.46  pH, Blood: x     /  pCO2: 31    /  pO2: 116   / HCO3: 21    / Base Excess: -1.6  /  SaO2: 98                                      8.2    13.06 )-----------( 341      ( 09 Oct 2019 05:00 )             27.3     10-    138  |  100  |  105<H>  ----------------------------<  229<H>  3.6   |  19<L>  |  2.90<H>    Ca    8.4      09 Oct 2019 05:00  Phos  5.0     10-09  Mg     2.6     10-09    TPro  7.2  /  Alb  3.4  /  TBili  0.7  /  DBili  x   /  AST  2726<H>  /  ALT  2168<H>  /  AlkPhos  153<H>  10-09    LIVER FUNCTIONS - ( 09 Oct 2019 05:00 )  Alb: 3.4 g/dL / Pro: 7.2 g/dL / ALK PHOS: 153 U/L / ALT: 2168 U/L / AST: 2726 U/L / GGT: x           PT/INR - ( 09 Oct 2019 05:00 )   PT: 19.2 sec;   INR: 1.66 ratio         PTT - ( 09 Oct 2019 05:00 )  PTT:77.2 sec  CKMB Units: 11.1 ng/mL (10-08 @ 14:01)  Creatine Kinase, Serum: 149 U/L (10-08 @ 14:01)  CKMB Units: 12.1 ng/mL (10-08 @ 09:36)  Creatine Kinase, Serum: 169 U/L (10-08 @ 09:36)    CARDIAC MARKERS ( 08 Oct 2019 14:01 )  x     / x     / 149 U/L / x     / 11.1 ng/mL  CARDIAC MARKERS ( 08 Oct 2019 09:36 )  x     / x     / 169 U/L / x     / 12.1 ng/mL      Urinalysis Basic - ( 08 Oct 2019 02:35 )    Color: Yellow / Appearance: Clear / S.016 / pH: x  Gluc: x / Ketone: Negative  / Bili: Negative / Urobili: Negative   Blood: x / Protein: 30 mg/dL / Nitrite: Negative   Leuk Esterase: Negative / RBC: 1 /hpf / WBC 0 /HPF   Sq Epi: x / Non Sq Epi: 2 /hpf / Bacteria: Negative        TELEMETRY:     EKG:     IMAGING:  < from: TTE with Doppler (w/Cont) (10.08.19 @ 09:03) >  Conclusions:  Endocardial visualization enhanced with intravenous  injection of Ultrasonic Enhancing Agent (Definity).  Severely reduced left ventricular systolic function.  Moderate right ventricular enlargement. Right ventricular  systolic function is at least moderately redcued.  s/p MitraClip.  Moderate pulmonary hypertension.    < end of copied text > PATIENT:  SELVIN CLAIRE  [id number]    CHIEF COMPLAINT:  Patient is a 73y old  Female who presents with a chief complaint of Acute decompensated heart failure, SOB (08 Oct 2019 22:37)      INTERVAL HISTORY/OVERNIGHT EVENTS: S/p RHC and IABP 10/8. Overnight pt complained of chest pain, was given sublingual nitro and morphine 1 mg. Weaned off of BIPAP to NC. Hemodynamics overnight CO: 5.25, CI: 3.57, , CVP ~11. MVO2 64%, lactate 1.6. UOP improved w/ metolazone, remains on bumex gtt. Started hydralazine 25 mg TID. Pt seen and examined at bedside.       REVIEW OF SYSTEMS:    Constitutional:     [ ] negative [ ] fevers [ ] chills [ ] weight loss [ ] weight gain  HEENT:                  [ ] negative [ ] dry eyes [ ] eye irritation [ ] postnasal drip [ ] nasal congestion  CV:                         [ ] negative  [ ] chest pain [ ] orthopnea [ ] palpitations [ ] murmur  Resp:                     [ ] negative [ ] cough [ ] shortness of breath [ ] dyspnea [ ] wheezing [ ] sputum [ ] hemoptysis  GI:                          [ ] negative [ ] nausea [ ] vomiting [ ] diarrhea [ ] constipation [ ] abd pain [ ] dysphagia   :                        [ ] negative [ ] dysuria [ ] nocturia [ ] hematuria [ ] increased urinary frequency  Musculoskeletal: [ ] negative [ ] back pain [ ] myalgias [ ] arthralgias [ ] fracture  Skin:                       [ ] negative [ ] rash [ ] itch  Neurological:        [ ] negative [ ] headache [ ] dizziness [ ] syncope [ ] weakness [ ] numbness  Psychiatric:           [ ] negative [ ] anxiety [ ] depression  Endocrine:            [ ] negative [ ] diabetes [ ] thyroid problem  Heme/Lymph:      [ ] negative [ ] anemia [ ] bleeding problem  Allergic/Immune: [ ] negative [ ] itchy eyes [ ] nasal discharge [ ] hives [ ] angioedema    [ ] All other systems negative  [X ] Unable to assess ROS because _____patient not answering questions___.    MEDICATIONS:  MEDICATIONS  (STANDING):  aspirin enteric coated 81 milliGRAM(s) Oral daily  buMETAnide Infusion 2 mG/Hr (10 mL/Hr) IV Continuous <Continuous>  chlorhexidine 2% Cloths 1 Application(s) Topical <User Schedule>  clopidogrel Tablet 75 milliGRAM(s) Oral daily  dextrose 5%. 1000 milliLiter(s) (50 mL/Hr) IV Continuous <Continuous>  dextrose 50% Injectable 12.5 Gram(s) IV Push once  dextrose 50% Injectable 25 Gram(s) IV Push once  dextrose 50% Injectable 25 Gram(s) IV Push once  docusate sodium 100 milliGRAM(s) Oral two times a day  heparin  Infusion.  Unit(s)/Hr (6.5 mL/Hr) IV Continuous <Continuous>  hydrALAZINE 25 milliGRAM(s) Oral three times a day  insulin lispro (HumaLOG) corrective regimen sliding scale   SubCutaneous three times a day before meals  levothyroxine 50 MICROGram(s) Oral daily  pantoprazole    Tablet 40 milliGRAM(s) Oral before breakfast  piperacillin/tazobactam IVPB.. 3.375 Gram(s) IV Intermittent every 12 hours  polyethylene glycol 3350 17 Gram(s) Oral daily  potassium chloride  20 mEq/100 mL IVPB 20 milliEquivalent(s) IV Intermittent every 2 hours  senna 2 Tablet(s) Oral at bedtime  sodium bicarbonate 650 milliGRAM(s) Oral two times a day  sodium chloride 0.9%. 1000 milliLiter(s) (5 mL/Hr) IV Continuous <Continuous>  vancomycin  IVPB      vancomycin  IVPB 750 milliGRAM(s) IV Intermittent every 12 hours    MEDICATIONS  (PRN):  ALPRAZolam 0.25 milliGRAM(s) Oral three times a day PRN Anxiety  dextrose 40% Gel 15 Gram(s) Oral once PRN Blood Glucose LESS THAN 70 milliGRAM(s)/deciliter  glucagon  Injectable 1 milliGRAM(s) IntraMuscular once PRN Glucose LESS THAN 70 milligrams/deciliter  heparin  Injectable 3200 Unit(s) IV Push every 6 hours PRN For aPTT less than 40      ALLERGIES:  Allergies    azithromycin (Hives; Pruritus)    Intolerances        OBJECTIVE:  ICU Vital Signs Last 24 Hrs  T(C): 36.7 (09 Oct 2019 05:00), Max: 37.2 (08 Oct 2019 21:00)  T(F): 98 (09 Oct 2019 05:00), Max: 99 (08 Oct 2019 21:00)  HR: 70 (09 Oct 2019 06:00) (68 - 82)  BP: 90/54 (08 Oct 2019 08:00) (90/54 - 90/54)  BP(mean): 71 (08 Oct 2019 08:00) (71 - 71)  ABP: 90/28 (09 Oct 2019 06:00) (80/28 - 170/26)  ABP(mean): 62 (09 Oct 2019 06:00) (58 - 90)  RR: 34 (09 Oct 2019 06:00) (14 - 42)  SpO2: 98% (09 Oct 2019 06:00) (95% - 100%)      Adult Advanced Hemodynamics Last 24 Hrs  CVP(mm Hg): 7 (08 Oct 2019 22:00) (7 - 25)  CVP(cm H2O): --  CO: --  CI: --  PA: 41/11 (09 Oct 2019 06:00) (41/11 - 65/29)  PA(mean): 20 (09 Oct 2019 06:00) (20 - 174)  PCWP: --  SVR: --  SVRI: --  PVR: --  PVRI: --  CAPILLARY BLOOD GLUCOSE      POCT Blood Glucose.: 278 mg/dL (08 Oct 2019 07:33)    CAPILLARY BLOOD GLUCOSE      POCT Blood Glucose.: 278 mg/dL (08 Oct 2019 07:33)    I&O's Summary    08 Oct 2019 07:01  -  09 Oct 2019 07:00  --------------------------------------------------------  IN: 698 mL / OUT: 4575 mL / NET: -3877 mL      Daily     Daily     PHYSICAL EXAMINATION:  General: ill-appearing elderly woman lying in bed on Nasal cannula 2L   HEENT: PERRLA, EOMI, moist mucous membranes  Respiratory: clear bilaterally   CV: RRR, S1S2, no murmurs, rubs or gallops  Abdominal: Soft, mildly distended, nontender   Extremities: no edema, + peripheral pulses  Tubes: +Oscar     LABS:  ABG - ( 09 Oct 2019 04:54 )  pH, Arterial: 7.46  pH, Blood: x     /  pCO2: 31    /  pO2: 116   / HCO3: 21    / Base Excess: -1.6  /  SaO2: 98                                      8.2    13.06 )-----------( 341      ( 09 Oct 2019 05:00 )             27.3     10-09    138  |  100  |  105<H>  ----------------------------<  229<H>  3.6   |  19<L>  |  2.90<H>    Ca    8.4      09 Oct 2019 05:00  Phos  5.0     10-09  Mg     2.6     10-09    TPro  7.2  /  Alb  3.4  /  TBili  0.7  /  DBili  x   /  AST  2726<H>  /  ALT  2168<H>  /  AlkPhos  153<H>  10-09    LIVER FUNCTIONS - ( 09 Oct 2019 05:00 )  Alb: 3.4 g/dL / Pro: 7.2 g/dL / ALK PHOS: 153 U/L / ALT: 2168 U/L / AST: 2726 U/L / GGT: x           PT/INR - ( 09 Oct 2019 05:00 )   PT: 19.2 sec;   INR: 1.66 ratio         PTT - ( 09 Oct 2019 05:00 )  PTT:77.2 sec  CKMB Units: 11.1 ng/mL (10-08 @ 14:01)  Creatine Kinase, Serum: 149 U/L (10-08 @ 14:01)  CKMB Units: 12.1 ng/mL (10-08 @ 09:36)  Creatine Kinase, Serum: 169 U/L (10-08 @ 09:36)    CARDIAC MARKERS ( 08 Oct 2019 14:01 )  x     / x     / 149 U/L / x     / 11.1 ng/mL  CARDIAC MARKERS ( 08 Oct 2019 09:36 )  x     / x     / 169 U/L / x     / 12.1 ng/mL      Urinalysis Basic - ( 08 Oct 2019 02:35 )    Color: Yellow / Appearance: Clear / S.016 / pH: x  Gluc: x / Ketone: Negative  / Bili: Negative / Urobili: Negative   Blood: x / Protein: 30 mg/dL / Nitrite: Negative   Leuk Esterase: Negative / RBC: 1 /hpf / WBC 0 /HPF   Sq Epi: x / Non Sq Epi: 2 /hpf / Bacteria: Negative        TELEMETRY:     EKG:     IMAGING:  < from: TTE with Doppler (w/Cont) (10.08.19 @ 09:03) >  Conclusions:  Endocardial visualization enhanced with intravenous  injection of Ultrasonic Enhancing Agent (Definity).  Severely reduced left ventricular systolic function.  Moderate right ventricular enlargement. Right ventricular  systolic function is at least moderately redcued.  s/p MitraClip.  Moderate pulmonary hypertension.    < end of copied text > PATIENT:  SELVIN CLAIRE  [id number]    CHIEF COMPLAINT:  Patient is a 73y old  Female who presents with a chief complaint of Acute decompensated heart failure, SOB (08 Oct 2019 22:37)      INTERVAL HISTORY/OVERNIGHT EVENTS: S/p RHC and IABP 10/8. Overnight pt complained of chest pain, was given sublingual nitro and morphine 1 mg. Weaned off of BIPAP to NC. Hemodynamics overnight CO: 5.25, CI: 3.57, , CVP ~11. MVO2 64%, lactate 1.6. UOP improved w/ metolazone, remains on bumex gtt. Started hydralazine 25 mg TID. Pt seen and examined at bedside.       REVIEW OF SYSTEMS:    Constitutional:     [ ] negative [ ] fevers [ ] chills [ ] weight loss [ ] weight gain  HEENT:                  [ ] negative [ ] dry eyes [ ] eye irritation [ ] postnasal drip [ ] nasal congestion  CV:                         [ ] negative  [ ] chest pain [ ] orthopnea [ ] palpitations [ ] murmur  Resp:                     [ ] negative [ ] cough [ ] shortness of breath [ ] dyspnea [ ] wheezing [ ] sputum [ ] hemoptysis  GI:                          [ ] negative [ ] nausea [ ] vomiting [ ] diarrhea [ ] constipation [ ] abd pain [ ] dysphagia   :                        [ ] negative [ ] dysuria [ ] nocturia [ ] hematuria [ ] increased urinary frequency  Musculoskeletal: [ ] negative [ ] back pain [ ] myalgias [ ] arthralgias [ ] fracture  Skin:                       [ ] negative [ ] rash [ ] itch  Neurological:        [ ] negative [ ] headache [ ] dizziness [ ] syncope [ ] weakness [ ] numbness  Psychiatric:           [ ] negative [ ] anxiety [ ] depression  Endocrine:            [ ] negative [ ] diabetes [ ] thyroid problem  Heme/Lymph:      [ ] negative [ ] anemia [ ] bleeding problem  Allergic/Immune: [ ] negative [ ] itchy eyes [ ] nasal discharge [ ] hives [ ] angioedema    [ ] All other systems negative  [X ] Unable to assess ROS because _____patient not answering questions___.    MEDICATIONS:  MEDICATIONS  (STANDING):  aspirin enteric coated 81 milliGRAM(s) Oral daily  buMETAnide Infusion 2 mG/Hr (10 mL/Hr) IV Continuous <Continuous>  chlorhexidine 2% Cloths 1 Application(s) Topical <User Schedule>  clopidogrel Tablet 75 milliGRAM(s) Oral daily  dextrose 5%. 1000 milliLiter(s) (50 mL/Hr) IV Continuous <Continuous>  dextrose 50% Injectable 12.5 Gram(s) IV Push once  dextrose 50% Injectable 25 Gram(s) IV Push once  dextrose 50% Injectable 25 Gram(s) IV Push once  docusate sodium 100 milliGRAM(s) Oral two times a day  heparin  Infusion.  Unit(s)/Hr (6.5 mL/Hr) IV Continuous <Continuous>  hydrALAZINE 25 milliGRAM(s) Oral three times a day  insulin lispro (HumaLOG) corrective regimen sliding scale   SubCutaneous three times a day before meals  levothyroxine 50 MICROGram(s) Oral daily  pantoprazole    Tablet 40 milliGRAM(s) Oral before breakfast  piperacillin/tazobactam IVPB.. 3.375 Gram(s) IV Intermittent every 12 hours  polyethylene glycol 3350 17 Gram(s) Oral daily  potassium chloride  20 mEq/100 mL IVPB 20 milliEquivalent(s) IV Intermittent every 2 hours  senna 2 Tablet(s) Oral at bedtime  sodium bicarbonate 650 milliGRAM(s) Oral two times a day  sodium chloride 0.9%. 1000 milliLiter(s) (5 mL/Hr) IV Continuous <Continuous>  vancomycin  IVPB      vancomycin  IVPB 750 milliGRAM(s) IV Intermittent every 12 hours    MEDICATIONS  (PRN):  ALPRAZolam 0.25 milliGRAM(s) Oral three times a day PRN Anxiety  dextrose 40% Gel 15 Gram(s) Oral once PRN Blood Glucose LESS THAN 70 milliGRAM(s)/deciliter  glucagon  Injectable 1 milliGRAM(s) IntraMuscular once PRN Glucose LESS THAN 70 milligrams/deciliter  heparin  Injectable 3200 Unit(s) IV Push every 6 hours PRN For aPTT less than 40      ALLERGIES:  Allergies    azithromycin (Hives; Pruritus)    Intolerances        OBJECTIVE:  ICU Vital Signs Last 24 Hrs  T(C): 36.7 (09 Oct 2019 05:00), Max: 37.2 (08 Oct 2019 21:00)  T(F): 98 (09 Oct 2019 05:00), Max: 99 (08 Oct 2019 21:00)  HR: 70 (09 Oct 2019 06:00) (68 - 82)  BP: 90/54 (08 Oct 2019 08:00) (90/54 - 90/54)  BP(mean): 71 (08 Oct 2019 08:00) (71 - 71)  ABP: 90/28 (09 Oct 2019 06:00) (80/28 - 170/26)  ABP(mean): 62 (09 Oct 2019 06:00) (58 - 90)  RR: 34 (09 Oct 2019 06:00) (14 - 42)  SpO2: 98% (09 Oct 2019 06:00) (95% - 100%)      Adult Advanced Hemodynamics Last 24 Hrs  CVP(mm Hg): 7 (08 Oct 2019 22:00) (7 - 25)  CVP(cm H2O): --  CO: --  CI: --  PA: 41/11 (09 Oct 2019 06:00) (41/11 - 65/29)  PA(mean): 20 (09 Oct 2019 06:00) (20 - 174)  PCWP: --  SVR: --  SVRI: --  PVR: --  PVRI: --  CAPILLARY BLOOD GLUCOSE      POCT Blood Glucose.: 278 mg/dL (08 Oct 2019 07:33)    CAPILLARY BLOOD GLUCOSE      POCT Blood Glucose.: 278 mg/dL (08 Oct 2019 07:33)    I&O's Summary    08 Oct 2019 07:01  -  09 Oct 2019 07:00  --------------------------------------------------------  IN: 698 mL / OUT: 4575 mL / NET: -3877 mL      Daily     Daily     PHYSICAL EXAMINATION:  General: ill-appearing elderly woman lying in bed on Nasal cannula 2L   HEENT: PERRLA, EOMI, moist mucous membranes  Respiratory: clear bilaterally   CV: RRR, S1S2, no murmurs, rubs or gallops  Abdominal: Soft, mildly distended, nontender   Extremities: no edema, + peripheral pulses  Skin: right groin area with clean dressing    Tubes: +Oscar     LABS:  ABG - ( 09 Oct 2019 04:54 )  pH, Arterial: 7.46  pH, Blood: x     /  pCO2: 31    /  pO2: 116   / HCO3: 21    / Base Excess: -1.6  /  SaO2: 98                                      8.2    13.06 )-----------( 341      ( 09 Oct 2019 05:00 )             27.3     10-09    138  |  100  |  105<H>  ----------------------------<  229<H>  3.6   |  19<L>  |  2.90<H>    Ca    8.4      09 Oct 2019 05:00  Phos  5.0     10-09  Mg     2.6     10-09    TPro  7.2  /  Alb  3.4  /  TBili  0.7  /  DBili  x   /  AST  2726<H>  /  ALT  2168<H>  /  AlkPhos  153<H>  10-09    LIVER FUNCTIONS - ( 09 Oct 2019 05:00 )  Alb: 3.4 g/dL / Pro: 7.2 g/dL / ALK PHOS: 153 U/L / ALT: 2168 U/L / AST: 2726 U/L / GGT: x           PT/INR - ( 09 Oct 2019 05:00 )   PT: 19.2 sec;   INR: 1.66 ratio         PTT - ( 09 Oct 2019 05:00 )  PTT:77.2 sec  CKMB Units: 11.1 ng/mL (10-08 @ 14:01)  Creatine Kinase, Serum: 149 U/L (10-08 @ 14:01)  CKMB Units: 12.1 ng/mL (10-08 @ 09:36)  Creatine Kinase, Serum: 169 U/L (10-08 @ 09:36)    CARDIAC MARKERS ( 08 Oct 2019 14:01 )  x     / x     / 149 U/L / x     / 11.1 ng/mL  CARDIAC MARKERS ( 08 Oct 2019 09:36 )  x     / x     / 169 U/L / x     / 12.1 ng/mL      Urinalysis Basic - ( 08 Oct 2019 02:35 )    Color: Yellow / Appearance: Clear / S.016 / pH: x  Gluc: x / Ketone: Negative  / Bili: Negative / Urobili: Negative   Blood: x / Protein: 30 mg/dL / Nitrite: Negative   Leuk Esterase: Negative / RBC: 1 /hpf / WBC 0 /HPF   Sq Epi: x / Non Sq Epi: 2 /hpf / Bacteria: Negative        TELEMETRY:     EKG:     IMAGING:  < from: TTE with Doppler (w/Cont) (10.08.19 @ 09:03) >  Conclusions:  Endocardial visualization enhanced with intravenous  injection of Ultrasonic Enhancing Agent (Definity).  Severely reduced left ventricular systolic function.  Moderate right ventricular enlargement. Right ventricular  systolic function is at least moderately redcued.  s/p MitraClip.  Moderate pulmonary hypertension.    < end of copied text > PATIENT:  SELVIN CLAIRE  [id number]    CHIEF COMPLAINT:  Patient is a 73y old  Female who presents with a chief complaint of Acute decompensated heart failure, SOB (08 Oct 2019 22:37)      INTERVAL HISTORY/OVERNIGHT EVENTS: S/p RHC and IABP 10/8. Overnight pt complained of chest pain, was given sublingual nitro and morphine 1 mg. Weaned off of BIPAP to NC. Hemodynamics overnight CO: 5.25, CI: 3.57, , CVP ~11. MVO2 64%, lactate 1.6. UOP improved w/ metolazone, remains on bumex gtt. Started hydralazine 25 mg TID. Pt seen and examined at bedside.       REVIEW OF SYSTEMS:    Constitutional:     [ ] negative [ ] fevers [ ] chills [ ] weight loss [ ] weight gain  HEENT:                  [ ] negative [ ] dry eyes [ ] eye irritation [ ] postnasal drip [ ] nasal congestion  CV:                         [ ] negative  [ ] chest pain [ ] orthopnea [ ] palpitations [ ] murmur  Resp:                     [ ] negative [ ] cough [ ] shortness of breath [ ] dyspnea [ ] wheezing [ ] sputum [ ] hemoptysis  GI:                          [ ] negative [ ] nausea [ ] vomiting [ ] diarrhea [ ] constipation [ ] abd pain [ ] dysphagia   :                        [ ] negative [ ] dysuria [ ] nocturia [ ] hematuria [ ] increased urinary frequency  Musculoskeletal: [ ] negative [ ] back pain [ ] myalgias [ ] arthralgias [ ] fracture  Skin:                       [ ] negative [ ] rash [ ] itch  Neurological:        [ ] negative [ ] headache [ ] dizziness [ ] syncope [ ] weakness [ ] numbness  Psychiatric:           [ ] negative [ ] anxiety [ ] depression  Endocrine:            [ ] negative [ ] diabetes [ ] thyroid problem  Heme/Lymph:      [ ] negative [ ] anemia [ ] bleeding problem  Allergic/Immune: [ ] negative [ ] itchy eyes [ ] nasal discharge [ ] hives [ ] angioedema    [ ] All other systems negative  [X ] Unable to assess ROS because _____patient not answering questions___.    MEDICATIONS:  MEDICATIONS  (STANDING):  aspirin enteric coated 81 milliGRAM(s) Oral daily  buMETAnide Infusion 2 mG/Hr (10 mL/Hr) IV Continuous <Continuous>  chlorhexidine 2% Cloths 1 Application(s) Topical <User Schedule>  clopidogrel Tablet 75 milliGRAM(s) Oral daily  dextrose 5%. 1000 milliLiter(s) (50 mL/Hr) IV Continuous <Continuous>  dextrose 50% Injectable 12.5 Gram(s) IV Push once  dextrose 50% Injectable 25 Gram(s) IV Push once  dextrose 50% Injectable 25 Gram(s) IV Push once  docusate sodium 100 milliGRAM(s) Oral two times a day  heparin  Infusion.  Unit(s)/Hr (6.5 mL/Hr) IV Continuous <Continuous>  hydrALAZINE 25 milliGRAM(s) Oral three times a day  insulin lispro (HumaLOG) corrective regimen sliding scale   SubCutaneous three times a day before meals  levothyroxine 50 MICROGram(s) Oral daily  pantoprazole    Tablet 40 milliGRAM(s) Oral before breakfast  piperacillin/tazobactam IVPB.. 3.375 Gram(s) IV Intermittent every 12 hours  polyethylene glycol 3350 17 Gram(s) Oral daily  potassium chloride  20 mEq/100 mL IVPB 20 milliEquivalent(s) IV Intermittent every 2 hours  senna 2 Tablet(s) Oral at bedtime  sodium bicarbonate 650 milliGRAM(s) Oral two times a day  sodium chloride 0.9%. 1000 milliLiter(s) (5 mL/Hr) IV Continuous <Continuous>  vancomycin  IVPB      vancomycin  IVPB 750 milliGRAM(s) IV Intermittent every 12 hours    MEDICATIONS  (PRN):  ALPRAZolam 0.25 milliGRAM(s) Oral three times a day PRN Anxiety  dextrose 40% Gel 15 Gram(s) Oral once PRN Blood Glucose LESS THAN 70 milliGRAM(s)/deciliter  glucagon  Injectable 1 milliGRAM(s) IntraMuscular once PRN Glucose LESS THAN 70 milligrams/deciliter  heparin  Injectable 3200 Unit(s) IV Push every 6 hours PRN For aPTT less than 40      ALLERGIES:  Allergies    azithromycin (Hives; Pruritus)    Intolerances        OBJECTIVE:  ICU Vital Signs Last 24 Hrs  T(C): 36.7 (09 Oct 2019 05:00), Max: 37.2 (08 Oct 2019 21:00)  T(F): 98 (09 Oct 2019 05:00), Max: 99 (08 Oct 2019 21:00)  HR: 70 (09 Oct 2019 06:00) (68 - 82)  BP: 90/54 (08 Oct 2019 08:00) (90/54 - 90/54)  BP(mean): 71 (08 Oct 2019 08:00) (71 - 71)  ABP: 90/28 (09 Oct 2019 06:00) (80/28 - 170/26)  ABP(mean): 62 (09 Oct 2019 06:00) (58 - 90)  RR: 34 (09 Oct 2019 06:00) (14 - 42)  SpO2: 98% (09 Oct 2019 06:00) (95% - 100%)      Adult Advanced Hemodynamics Last 24 Hrs  CVP(mm Hg): 7 (08 Oct 2019 22:00) (7 - 25)  CVP(cm H2O): --  CO: --  CI: --  PA: 41/11 (09 Oct 2019 06:00) (41/11 - 65/29)  PA(mean): 20 (09 Oct 2019 06:00) (20 - 174)  PCWP: --  SVR: --  SVRI: --  PVR: --  PVRI: --  CAPILLARY BLOOD GLUCOSE      POCT Blood Glucose.: 278 mg/dL (08 Oct 2019 07:33)    CAPILLARY BLOOD GLUCOSE      POCT Blood Glucose.: 278 mg/dL (08 Oct 2019 07:33)    I&O's Summary    08 Oct 2019 07:01  -  09 Oct 2019 07:00  --------------------------------------------------------  IN: 698 mL / OUT: 4575 mL / NET: -3877 mL      Daily     Daily     PHYSICAL EXAMINATION:  General: ill-appearing elderly woman lying in bed on Nasal cannula 2L   HEENT: PERRLA, EOMI, moist mucous membranes  Respiratory: clear bilaterally   CV: RRR, S1S2, no murmurs, rubs or gallops  Abdominal: Soft, mildly distended, nontender   Extremities: no edema, + peripheral pulses  Skin: right groin area with clean dressing    Tubes: +Oscar     LABS:  ABG - ( 09 Oct 2019 04:54 )  pH, Arterial: 7.46  pH, Blood: x     /  pCO2: 31    /  pO2: 116   / HCO3: 21    / Base Excess: -1.6  /  SaO2: 98                                      8.2    13.06 )-----------( 341      ( 09 Oct 2019 05:00 )             27.3     10-09    138  |  100  |  105<H>  ----------------------------<  229<H>  3.6   |  19<L>  |  2.90<H>    Ca    8.4      09 Oct 2019 05:00  Phos  5.0     10-09  Mg     2.6     10-09    TPro  7.2  /  Alb  3.4  /  TBili  0.7  /  DBili  x   /  AST  2726<H>  /  ALT  2168<H>  /  AlkPhos  153<H>  10-09    LIVER FUNCTIONS - ( 09 Oct 2019 05:00 )  Alb: 3.4 g/dL / Pro: 7.2 g/dL / ALK PHOS: 153 U/L / ALT: 2168 U/L / AST: 2726 U/L / GGT: x           PT/INR - ( 09 Oct 2019 05:00 )   PT: 19.2 sec;   INR: 1.66 ratio         PTT - ( 09 Oct 2019 05:00 )  PTT:77.2 sec  CKMB Units: 11.1 ng/mL (10-08 @ 14:01)  Creatine Kinase, Serum: 149 U/L (10-08 @ 14:01)  CKMB Units: 12.1 ng/mL (10-08 @ 09:36)  Creatine Kinase, Serum: 169 U/L (10-08 @ 09:36)    CARDIAC MARKERS ( 08 Oct 2019 14:01 )  x     / x     / 149 U/L / x     / 11.1 ng/mL  CARDIAC MARKERS ( 08 Oct 2019 09:36 )  x     / x     / 169 U/L / x     / 12.1 ng/mL      Urinalysis Basic - ( 08 Oct 2019 02:35 )    Color: Yellow / Appearance: Clear / S.016 / pH: x  Gluc: x / Ketone: Negative  / Bili: Negative / Urobili: Negative   Blood: x / Protein: 30 mg/dL / Nitrite: Negative   Leuk Esterase: Negative / RBC: 1 /hpf / WBC 0 /HPF   Sq Epi: x / Non Sq Epi: 2 /hpf / Bacteria: Negative    Culture - Urine (10.08.19 @ 05:03)    Specimen Source: .Urine    Culture Results:   No growth    Culture - Blood (10.08.19 @ 01:44)    Specimen Source: .Blood    Culture Results:   No growth to date.    Culture - Blood (10.08.19 @ 01:44)    Specimen Source: .Blood    Culture Results:   No growth to date.          TELEMETRY:     EKG:     IMAGING:  < from: TTE with Doppler (w/Cont) (10.08.19 @ 09:03) >  Conclusions:  Endocardial visualization enhanced with intravenous  injection of Ultrasonic Enhancing Agent (Definity).  Severely reduced left ventricular systolic function.  Moderate right ventricular enlargement. Right ventricular  systolic function is at least moderately redcued.  s/p MitraClip.  Moderate pulmonary hypertension.    < end of copied text > PATIENT:  SELVIN CLAIRE  [id number]    CHIEF COMPLAINT:  Patient is a 73y old  Female who presents with a chief complaint of Acute decompensated heart failure, SOB (08 Oct 2019 22:37)      INTERVAL HISTORY/OVERNIGHT EVENTS: S/p RHC and IABP 10/8. Overnight pt complained of chest pain, was given sublingual nitro and morphine 1 mg. Weaned off of BIPAP to NC. Hemodynamics overnight CO: 5.25, CI: 3.57, , CVP ~11. MVO2 64%, lactate 1.6. UOP improved w/ metolazone, remains on bumex gtt. Started hydralazine 25 mg TID. Pt seen and examined at bedside.       REVIEW OF SYSTEMS:    Constitutional:     [ ] negative [ ] fevers [ ] chills [ ] weight loss [ ] weight gain  HEENT:                  [ ] negative [ ] dry eyes [ ] eye irritation [ ] postnasal drip [ ] nasal congestion  CV:                         [ ] negative  [ ] chest pain [ ] orthopnea [ ] palpitations [ ] murmur  Resp:                     [ ] negative [ ] cough [ ] shortness of breath [ ] dyspnea [ ] wheezing [ ] sputum [ ] hemoptysis  GI:                          [ ] negative [ ] nausea [ ] vomiting [ ] diarrhea [ ] constipation [ ] abd pain [ ] dysphagia   :                        [ ] negative [ ] dysuria [ ] nocturia [ ] hematuria [ ] increased urinary frequency  Musculoskeletal: [ ] negative [ ] back pain [ ] myalgias [ ] arthralgias [ ] fracture  Skin:                       [ ] negative [ ] rash [ ] itch  Neurological:        [ ] negative [ ] headache [ ] dizziness [ ] syncope [ ] weakness [ ] numbness  Psychiatric:           [ ] negative [ ] anxiety [ ] depression  Endocrine:            [ ] negative [ ] diabetes [ ] thyroid problem  Heme/Lymph:      [ ] negative [ ] anemia [ ] bleeding problem  Allergic/Immune: [ ] negative [ ] itchy eyes [ ] nasal discharge [ ] hives [ ] angioedema    [ ] All other systems negative  [X ] Unable to assess ROS because _____patient not answering questions___.    MEDICATIONS:  MEDICATIONS  (STANDING):  aspirin enteric coated 81 milliGRAM(s) Oral daily  buMETAnide Infusion 2 mG/Hr (10 mL/Hr) IV Continuous <Continuous>  chlorhexidine 2% Cloths 1 Application(s) Topical <User Schedule>  clopidogrel Tablet 75 milliGRAM(s) Oral daily  dextrose 5%. 1000 milliLiter(s) (50 mL/Hr) IV Continuous <Continuous>  dextrose 50% Injectable 12.5 Gram(s) IV Push once  dextrose 50% Injectable 25 Gram(s) IV Push once  dextrose 50% Injectable 25 Gram(s) IV Push once  docusate sodium 100 milliGRAM(s) Oral two times a day  heparin  Infusion.  Unit(s)/Hr (6.5 mL/Hr) IV Continuous <Continuous>  hydrALAZINE 25 milliGRAM(s) Oral three times a day  insulin lispro (HumaLOG) corrective regimen sliding scale   SubCutaneous three times a day before meals  levothyroxine 50 MICROGram(s) Oral daily  pantoprazole    Tablet 40 milliGRAM(s) Oral before breakfast  piperacillin/tazobactam IVPB.. 3.375 Gram(s) IV Intermittent every 12 hours  polyethylene glycol 3350 17 Gram(s) Oral daily  potassium chloride  20 mEq/100 mL IVPB 20 milliEquivalent(s) IV Intermittent every 2 hours  senna 2 Tablet(s) Oral at bedtime  sodium bicarbonate 650 milliGRAM(s) Oral two times a day  sodium chloride 0.9%. 1000 milliLiter(s) (5 mL/Hr) IV Continuous <Continuous>  vancomycin  IVPB      vancomycin  IVPB 750 milliGRAM(s) IV Intermittent every 12 hours    MEDICATIONS  (PRN):  ALPRAZolam 0.25 milliGRAM(s) Oral three times a day PRN Anxiety  dextrose 40% Gel 15 Gram(s) Oral once PRN Blood Glucose LESS THAN 70 milliGRAM(s)/deciliter  glucagon  Injectable 1 milliGRAM(s) IntraMuscular once PRN Glucose LESS THAN 70 milligrams/deciliter  heparin  Injectable 3200 Unit(s) IV Push every 6 hours PRN For aPTT less than 40      ALLERGIES:  Allergies    azithromycin (Hives; Pruritus)    Intolerances        OBJECTIVE:  ICU Vital Signs Last 24 Hrs  T(C): 36.7 (09 Oct 2019 05:00), Max: 37.2 (08 Oct 2019 21:00)  T(F): 98 (09 Oct 2019 05:00), Max: 99 (08 Oct 2019 21:00)  HR: 70 (09 Oct 2019 06:00) (68 - 82)  BP: 90/54 (08 Oct 2019 08:00) (90/54 - 90/54)  BP(mean): 71 (08 Oct 2019 08:00) (71 - 71)  ABP: 90/28 (09 Oct 2019 06:00) (80/28 - 170/26)  ABP(mean): 62 (09 Oct 2019 06:00) (58 - 90)  RR: 34 (09 Oct 2019 06:00) (14 - 42)  SpO2: 98% (09 Oct 2019 06:00) (95% - 100%)      Adult Advanced Hemodynamics Last 24 Hrs  CVP(mm Hg): 7 (08 Oct 2019 22:00) (7 - 25)  CVP(cm H2O): --  CO: --  CI: --  PA: 41/11 (09 Oct 2019 06:00) (41/11 - 65/29)  PA(mean): 20 (09 Oct 2019 06:00) (20 - 174)  PCWP: --  SVR: --  SVRI: --  PVR: --  PVRI: --  CAPILLARY BLOOD GLUCOSE      POCT Blood Glucose.: 278 mg/dL (08 Oct 2019 07:33)    CAPILLARY BLOOD GLUCOSE      POCT Blood Glucose.: 278 mg/dL (08 Oct 2019 07:33)    I&O's Summary    08 Oct 2019 07:01  -  09 Oct 2019 07:00  --------------------------------------------------------  IN: 698 mL / OUT: 4575 mL / NET: -3877 mL      Daily     Daily     PHYSICAL EXAMINATION:  General: ill-appearing elderly woman lying in bed on Nasal cannula 2L   HEENT: PERRLA, EOMI, moist mucous membranes  Respiratory: clear bilaterally   CV: RRR, S1S2, no murmurs, rubs or gallops  Abdominal: Soft, mildly distended, nontender   Extremities: no edema, + peripheral pulses  Skin: right femoral balloon pump in place  Tubes: +Oscar     LABS:  ABG - ( 09 Oct 2019 04:54 )  pH, Arterial: 7.46  pH, Blood: x     /  pCO2: 31    /  pO2: 116   / HCO3: 21    / Base Excess: -1.6  /  SaO2: 98                                      8.2    13.06 )-----------( 341      ( 09 Oct 2019 05:00 )             27.3     10-09    138  |  100  |  105<H>  ----------------------------<  229<H>  3.6   |  19<L>  |  2.90<H>    Ca    8.4      09 Oct 2019 05:00  Phos  5.0     10-09  Mg     2.6     10-09    TPro  7.2  /  Alb  3.4  /  TBili  0.7  /  DBili  x   /  AST  2726<H>  /  ALT  2168<H>  /  AlkPhos  153<H>  10-09    LIVER FUNCTIONS - ( 09 Oct 2019 05:00 )  Alb: 3.4 g/dL / Pro: 7.2 g/dL / ALK PHOS: 153 U/L / ALT: 2168 U/L / AST: 2726 U/L / GGT: x           PT/INR - ( 09 Oct 2019 05:00 )   PT: 19.2 sec;   INR: 1.66 ratio         PTT - ( 09 Oct 2019 05:00 )  PTT:77.2 sec  CKMB Units: 11.1 ng/mL (10-08 @ 14:01)  Creatine Kinase, Serum: 149 U/L (10-08 @ 14:01)  CKMB Units: 12.1 ng/mL (10-08 @ 09:36)  Creatine Kinase, Serum: 169 U/L (10-08 @ 09:36)    CARDIAC MARKERS ( 08 Oct 2019 14:01 )  x     / x     / 149 U/L / x     / 11.1 ng/mL  CARDIAC MARKERS ( 08 Oct 2019 09:36 )  x     / x     / 169 U/L / x     / 12.1 ng/mL      Urinalysis Basic - ( 08 Oct 2019 02:35 )    Color: Yellow / Appearance: Clear / S.016 / pH: x  Gluc: x / Ketone: Negative  / Bili: Negative / Urobili: Negative   Blood: x / Protein: 30 mg/dL / Nitrite: Negative   Leuk Esterase: Negative / RBC: 1 /hpf / WBC 0 /HPF   Sq Epi: x / Non Sq Epi: 2 /hpf / Bacteria: Negative    Culture - Urine (10.08.19 @ 05:03)    Specimen Source: .Urine    Culture Results:   No growth    Culture - Blood (10.08.19 @ 01:44)    Specimen Source: .Blood    Culture Results:   No growth to date.    Culture - Blood (10.08.19 @ 01:44)    Specimen Source: .Blood    Culture Results:   No growth to date.          TELEMETRY:     EKG:     IMAGING:  < from: TTE with Doppler (w/Cont) (10.08.19 @ 09:03) >  Conclusions:  Endocardial visualization enhanced with intravenous  injection of Ultrasonic Enhancing Agent (Definity).  Severely reduced left ventricular systolic function.  Moderate right ventricular enlargement. Right ventricular  systolic function is at least moderately redcued.  s/p MitraClip.  Moderate pulmonary hypertension.    < end of copied text > PATIENT:  SELVIN CLAIRE  [id number]    CHIEF COMPLAINT:  Patient is a 73y old  Female who presents with a chief complaint of Acute decompensated heart failure, SOB (08 Oct 2019 22:37)      INTERVAL HISTORY/OVERNIGHT EVENTS: S/p RHC and IABP 10/8. Overnight pt complained of chest pain, was given sublingual nitro and morphine 1 mg. Weaned off of BIPAP to NC. Hemodynamics overnight CO: 5.25, CI: 3.57, , CVP ~11. MVO2 64%, lactate 1.6. UOP improved w/ metolazone, remains on bumex gtt. Started hydralazine 25 mg TID. Pt seen and examined at bedside. Continues to endorse chest pain.       REVIEW OF SYSTEMS:    Constitutional:     [ ] negative [ ] fevers [ ] chills [ ] weight loss [ ] weight gain  HEENT:                  [ ] negative [ ] dry eyes [ ] eye irritation [ ] postnasal drip [ ] nasal congestion  CV:                         [ ] negative  [ ] chest pain [ ] orthopnea [ ] palpitations [ ] murmur  Resp:                     [ ] negative [ ] cough [ ] shortness of breath [ ] dyspnea [ ] wheezing [ ] sputum [ ] hemoptysis  GI:                          [ ] negative [ ] nausea [ ] vomiting [ ] diarrhea [ ] constipation [ ] abd pain [ ] dysphagia   :                        [ ] negative [ ] dysuria [ ] nocturia [ ] hematuria [ ] increased urinary frequency  Musculoskeletal: [ ] negative [ ] back pain [ ] myalgias [ ] arthralgias [ ] fracture  Skin:                       [ ] negative [ ] rash [ ] itch  Neurological:        [ ] negative [ ] headache [ ] dizziness [ ] syncope [ ] weakness [ ] numbness  Psychiatric:           [ ] negative [ ] anxiety [ ] depression  Endocrine:            [ ] negative [ ] diabetes [ ] thyroid problem  Heme/Lymph:      [ ] negative [ ] anemia [ ] bleeding problem  Allergic/Immune: [ ] negative [ ] itchy eyes [ ] nasal discharge [ ] hives [ ] angioedema    [ ] All other systems negative  [X ] Unable to assess ROS because _____patient not answering questions___.    MEDICATIONS:  MEDICATIONS  (STANDING):  aspirin enteric coated 81 milliGRAM(s) Oral daily  buMETAnide Infusion 2 mG/Hr (10 mL/Hr) IV Continuous <Continuous>  chlorhexidine 2% Cloths 1 Application(s) Topical <User Schedule>  clopidogrel Tablet 75 milliGRAM(s) Oral daily  dextrose 5%. 1000 milliLiter(s) (50 mL/Hr) IV Continuous <Continuous>  dextrose 50% Injectable 12.5 Gram(s) IV Push once  dextrose 50% Injectable 25 Gram(s) IV Push once  dextrose 50% Injectable 25 Gram(s) IV Push once  docusate sodium 100 milliGRAM(s) Oral two times a day  heparin  Infusion.  Unit(s)/Hr (6.5 mL/Hr) IV Continuous <Continuous>  hydrALAZINE 25 milliGRAM(s) Oral three times a day  insulin lispro (HumaLOG) corrective regimen sliding scale   SubCutaneous three times a day before meals  levothyroxine 50 MICROGram(s) Oral daily  pantoprazole    Tablet 40 milliGRAM(s) Oral before breakfast  piperacillin/tazobactam IVPB.. 3.375 Gram(s) IV Intermittent every 12 hours  polyethylene glycol 3350 17 Gram(s) Oral daily  potassium chloride  20 mEq/100 mL IVPB 20 milliEquivalent(s) IV Intermittent every 2 hours  senna 2 Tablet(s) Oral at bedtime  sodium bicarbonate 650 milliGRAM(s) Oral two times a day  sodium chloride 0.9%. 1000 milliLiter(s) (5 mL/Hr) IV Continuous <Continuous>  vancomycin  IVPB      vancomycin  IVPB 750 milliGRAM(s) IV Intermittent every 12 hours    MEDICATIONS  (PRN):  ALPRAZolam 0.25 milliGRAM(s) Oral three times a day PRN Anxiety  dextrose 40% Gel 15 Gram(s) Oral once PRN Blood Glucose LESS THAN 70 milliGRAM(s)/deciliter  glucagon  Injectable 1 milliGRAM(s) IntraMuscular once PRN Glucose LESS THAN 70 milligrams/deciliter  heparin  Injectable 3200 Unit(s) IV Push every 6 hours PRN For aPTT less than 40      ALLERGIES:  Allergies    azithromycin (Hives; Pruritus)    Intolerances        OBJECTIVE:  ICU Vital Signs Last 24 Hrs  T(C): 36.7 (09 Oct 2019 05:00), Max: 37.2 (08 Oct 2019 21:00)  T(F): 98 (09 Oct 2019 05:00), Max: 99 (08 Oct 2019 21:00)  HR: 70 (09 Oct 2019 06:00) (68 - 82)  BP: 90/54 (08 Oct 2019 08:00) (90/54 - 90/54)  BP(mean): 71 (08 Oct 2019 08:00) (71 - 71)  ABP: 90/28 (09 Oct 2019 06:00) (80/28 - 170/26)  ABP(mean): 62 (09 Oct 2019 06:00) (58 - 90)  RR: 34 (09 Oct 2019 06:00) (14 - 42)  SpO2: 98% (09 Oct 2019 06:00) (95% - 100%)      Adult Advanced Hemodynamics Last 24 Hrs  CVP(mm Hg): 7 (08 Oct 2019 22:00) (7 - 25)  CVP(cm H2O): --  CO: --  CI: --  PA: 41/11 (09 Oct 2019 06:00) (41/11 - 65/29)  PA(mean): 20 (09 Oct 2019 06:00) (20 - 174)  PCWP: --  SVR: --  SVRI: --  PVR: --  PVRI: --  CAPILLARY BLOOD GLUCOSE      POCT Blood Glucose.: 278 mg/dL (08 Oct 2019 07:33)    CAPILLARY BLOOD GLUCOSE      POCT Blood Glucose.: 278 mg/dL (08 Oct 2019 07:33)    I&O's Summary    08 Oct 2019 07:01  -  09 Oct 2019 07:00  --------------------------------------------------------  IN: 698 mL / OUT: 4575 mL / NET: -3877 mL      Daily     Daily     PHYSICAL EXAMINATION:  General: ill-appearing elderly woman lying in bed on Nasal cannula 2L   HEENT: PERRLA, EOMI, moist mucous membranes  Respiratory: clear bilaterally   CV: RRR, S1S2, no murmurs, rubs or gallops  Abdominal: Soft, mildly distended, nontender   Extremities: no edema, + peripheral pulses  Skin: right femoral balloon pump in place  Tubes: +Dayne     LABS:  ABG - ( 09 Oct 2019 04:54 )  pH, Arterial: 7.46  pH, Blood: x     /  pCO2: 31    /  pO2: 116   / HCO3: 21    / Base Excess: -1.6  /  SaO2: 98                                      8.2    13.06 )-----------( 341      ( 09 Oct 2019 05:00 )             27.3     10-09    138  |  100  |  105<H>  ----------------------------<  229<H>  3.6   |  19<L>  |  2.90<H>    Ca    8.4      09 Oct 2019 05:00  Phos  5.0     10-09  Mg     2.6     10-09    TPro  7.2  /  Alb  3.4  /  TBili  0.7  /  DBili  x   /  AST  2726<H>  /  ALT  2168<H>  /  AlkPhos  153<H>  10-09    LIVER FUNCTIONS - ( 09 Oct 2019 05:00 )  Alb: 3.4 g/dL / Pro: 7.2 g/dL / ALK PHOS: 153 U/L / ALT: 2168 U/L / AST: 2726 U/L / GGT: x           PT/INR - ( 09 Oct 2019 05:00 )   PT: 19.2 sec;   INR: 1.66 ratio         PTT - ( 09 Oct 2019 05:00 )  PTT:77.2 sec  CKMB Units: 11.1 ng/mL (10-08 @ 14:01)  Creatine Kinase, Serum: 149 U/L (10-08 @ 14:01)  CKMB Units: 12.1 ng/mL (10-08 @ 09:36)  Creatine Kinase, Serum: 169 U/L (10-08 @ 09:36)    CARDIAC MARKERS ( 08 Oct 2019 14:01 )  x     / x     / 149 U/L / x     / 11.1 ng/mL  CARDIAC MARKERS ( 08 Oct 2019 09:36 )  x     / x     / 169 U/L / x     / 12.1 ng/mL      Urinalysis Basic - ( 08 Oct 2019 02:35 )    Color: Yellow / Appearance: Clear / S.016 / pH: x  Gluc: x / Ketone: Negative  / Bili: Negative / Urobili: Negative   Blood: x / Protein: 30 mg/dL / Nitrite: Negative   Leuk Esterase: Negative / RBC: 1 /hpf / WBC 0 /HPF   Sq Epi: x / Non Sq Epi: 2 /hpf / Bacteria: Negative    Culture - Urine (10.08.19 @ 05:03)    Specimen Source: .Urine    Culture Results:   No growth    Culture - Blood (10.08.19 @ 01:44)    Specimen Source: .Blood    Culture Results:   No growth to date.    Culture - Blood (10.08.19 @ 01:44)    Specimen Source: .Blood    Culture Results:   No growth to date.          TELEMETRY:     EKG:     IMAGING:  < from: TTE with Doppler (w/Cont) (10.08.19 @ 09:03) >  Conclusions:  Endocardial visualization enhanced with intravenous  injection of Ultrasonic Enhancing Agent (Definity).  Severely reduced left ventricular systolic function.  Moderate right ventricular enlargement. Right ventricular  systolic function is at least moderately redcued.  s/p MitraClip.  Moderate pulmonary hypertension.    < end of copied text >

## 2019-10-09 NOTE — PROGRESS NOTE ADULT - SUBJECTIVE AND OBJECTIVE BOX
[facility] [place] [facility]   MD MEGHANA SHAH D.O. FATIMA SHEIKH, D.O. RUORU WONG, PA    OFFICE: [phone]  DR SANTOYO CELL: [phone]  DR. ALAS CELL: [phone]  DR. RIDER CELL: [phone]  RASHIDA KENDALL CELL: [phone]      RENAL FOLLOW UP NOTE  --------------------------------------------------------------------------------  HPI: Pt seen and examined this AM. States she feels better today then yesterday. Is no longer on BiPAP this AM. States she has cough and feels SOB still. Started having CP overnight and still feels it but it is improved. Denies nausea, vomiting, h/a, dizziness.        PAST HISTORY  --------------------------------------------------------------------------------  No significant changes to PMH, PSH, FHx, SHx, unless otherwise noted    ALLERGIES & MEDICATIONS  --------------------------------------------------------------------------------  Allergies    azithromycin (Hives; Pruritus)    Intolerances      Standing Inpatient Medications  aspirin enteric coated 81 milliGRAM(s) Oral daily  cefepime   IVPB 1000 milliGRAM(s) IV Intermittent every 24 hours  chlorhexidine 2% Cloths 1 Application(s) Topical <User Schedule>  clopidogrel Tablet 75 milliGRAM(s) Oral daily  dextrose 5%. 1000 milliLiter(s) IV Continuous <Continuous>  dextrose 50% Injectable 12.5 Gram(s) IV Push once  dextrose 50% Injectable 25 Gram(s) IV Push once  dextrose 50% Injectable 25 Gram(s) IV Push once  docusate sodium 100 milliGRAM(s) Oral two times a day  heparin  Infusion.  Unit(s)/Hr IV Continuous <Continuous>  insulin glargine Injectable (LANTUS) 10 Unit(s) SubCutaneous at bedtime  insulin lispro (HumaLOG) corrective regimen sliding scale   SubCutaneous three times a day before meals  insulin lispro Injectable (HumaLOG) 3 Unit(s) SubCutaneous three times a day before meals  levothyroxine 50 MICROGram(s) Oral daily  nitroglycerin  Infusion 25 MICROgram(s)/Min IV Continuous <Continuous>  pantoprazole    Tablet 40 milliGRAM(s) Oral before breakfast  polyethylene glycol 3350 17 Gram(s) Oral daily  senna 2 Tablet(s) Oral at bedtime    PRN Inpatient Medications  ALPRAZolam 0.25 milliGRAM(s) Oral three times a day PRN  dextrose 40% Gel 15 Gram(s) Oral once PRN  glucagon  Injectable 1 milliGRAM(s) IntraMuscular once PRN  heparin  Injectable 3200 Unit(s) IV Push every 6 hours PRN      REVIEW OF SYSTEMS  --------------------------------------------------------------------------------  General: no fever  CVS: no chest pain  RESP: no sob, no cough  ABD: no abdominal pain  : no dysuria,  MSK: no edema     VITALS/PHYSICAL EXAM  --------------------------------------------------------------------------------  T(C): 36.6 (10-09-19 @ 19:00), Max: 37.2 (10-08-19 @ 21:00)  HR: 64 (10-09-19 @ 19:00) (64 - 82)  BP: --  RR: 16 (10-09-19 @ 19:00) (14 - 39)  SpO2: 94% (10-09-19 @ 19:00) (94% - 100%)  Wt(kg): --  Height (cm): 149.86 (10-07-19 @ 21:25)  Weight (kg): 53.1 (10-07-19 @ 21:25)  BMI (kg/m2): 23.6 (10-07-19 @ 21:25)  BSA (m2): 1.47 (10-07-19 @ 21:25)      10-08-19 @ 07:01  -  10-09-19 @ 07:00  --------------------------------------------------------  IN: 698 mL / OUT: 4575 mL / NET: -3877 mL    10-09-19 @ 07:01  -  10-09-19 @ 19:27  --------------------------------------------------------  IN: 504 mL / OUT: 2300 mL / NET: -1796 mL      Physical Exam:  	Gen: NAD  	HEENT: MMM  	Pulm: Diffuse rhonchi throughout, minimal end-expiratory wheezes B/L  	CV: S1S2, + murmur  	Abd: Soft, +BS  	Ext: No LE edema B/L              Neuro: Awake   	Skin: Warm and Dry   	    LABS/STUDIES  --------------------------------------------------------------------------------              8.2    13.06 >-----------<  341      [10-09-19 @ 05:00]              27.3     138  |  100  |  105  ----------------------------<  229      [10-09-19 @ 05:00]  3.6   |  19  |  2.90        Ca     8.4     [10-09-19 @ 05:00]      Mg     2.6     [10-09-19 @ 05:00]      Phos  5.0     [10-09-19 @ 05:00]    TPro  7.2  /  Alb  3.4  /  TBili  0.7  /  DBili  x   /  AST  2726  /  ALT  2168  /  AlkPhos  153  [10-09-19 @ 05:00]    PT/INR: PT 19.2 , INR 1.66       [10-09-19 @ 05:00]  PTT: 93.8       [10-09-19 @ 15:15]          [10-08-19 @ 14:01]    Creatinine Trend:  SCr 2.90 [10-09 @ 05:00]  SCr 2.86 [10-08 @ 23:21]  SCr 2.92 [10-08 @ 22:53]  SCr 3.04 [10-08 @ 17:22]  SCr 3.08 [10-08 @ 14:01]    Urinalysis - [10-08-19 @ 02:35]      Color Yellow / Appearance Clear / SG 1.016 / pH 5.5      Gluc Trace / Ketone Negative  / Bili Negative / Urobili Negative       Blood Negative / Protein 30 mg/dL / Leuk Est Negative / Nitrite Negative      RBC 1 / WBC 0 / Hyaline 6 / Gran 3-5 / Sq Epi  / Non Sq Epi 2 / Bacteria Negative    Urine Creatinine 20      [10-08-19 @ 20:53]  Urine Protein 20      [10-08-19 @ 23:13]  Urine Sodium 67      [10-08-19 @ 20:53]  Urine Osmolality 318      [10-08-19 @ 21:27]    Iron 42, TIBC 348, %sat 12      [07-05-19 @ 22:32]  Ferritin 130      [07-05-19 @ 22:32]  .4 (Ca --)      [04-25-19 @ 05:45]   --  Vitamin D (25OH) 25.9      [05-01-19 @ 04:00]  HbA1c 7.5      [10-08-19 @ 14:30]  TSH 1.01      [10-08-19 @ 14:47]  Lipid: chol 83, , HDL 14, LDL 46      [10-08-19 @ 14:48]

## 2019-10-10 DIAGNOSIS — R06.02 SHORTNESS OF BREATH: ICD-10-CM

## 2019-10-10 DIAGNOSIS — E11.9 TYPE 2 DIABETES MELLITUS WITHOUT COMPLICATIONS: ICD-10-CM

## 2019-10-10 DIAGNOSIS — I50.23 ACUTE ON CHRONIC SYSTOLIC (CONGESTIVE) HEART FAILURE: ICD-10-CM

## 2019-10-10 DIAGNOSIS — I34.0 NONRHEUMATIC MITRAL (VALVE) INSUFFICIENCY: ICD-10-CM

## 2019-10-10 DIAGNOSIS — N18.9 CHRONIC KIDNEY DISEASE, UNSPECIFIED: ICD-10-CM

## 2019-10-10 DIAGNOSIS — E03.9 HYPOTHYROIDISM, UNSPECIFIED: ICD-10-CM

## 2019-10-10 DIAGNOSIS — I25.10 ATHEROSCLEROTIC HEART DISEASE OF NATIVE CORONARY ARTERY WITHOUT ANGINA PECTORIS: ICD-10-CM

## 2019-10-10 DIAGNOSIS — N17.9 ACUTE KIDNEY FAILURE, UNSPECIFIED: ICD-10-CM

## 2019-10-10 DIAGNOSIS — D72.829 ELEVATED WHITE BLOOD CELL COUNT, UNSPECIFIED: ICD-10-CM

## 2019-10-10 DIAGNOSIS — I21.4 NON-ST ELEVATION (NSTEMI) MYOCARDIAL INFARCTION: ICD-10-CM

## 2019-10-10 LAB
ALBUMIN SERPL ELPH-MCNC: 3.2 G/DL — LOW (ref 3.3–5)
ALP SERPL-CCNC: 141 U/L — HIGH (ref 40–120)
ALT FLD-CCNC: 1803 U/L — HIGH (ref 10–45)
ANION GAP SERPL CALC-SCNC: 16 MMOL/L — SIGNIFICANT CHANGE UP (ref 5–17)
ANION GAP SERPL CALC-SCNC: 18 MMOL/L — HIGH (ref 5–17)
APTT BLD: 68.1 SEC — HIGH (ref 27.5–36.3)
APTT BLD: 87.8 SEC — HIGH (ref 27.5–36.3)
AST SERPL-CCNC: 1457 U/L — HIGH (ref 10–40)
BASE EXCESS BLDMV CALC-SCNC: 0.9 MMOL/L — SIGNIFICANT CHANGE UP (ref -3–3)
BASE EXCESS BLDMV CALC-SCNC: 0.9 MMOL/L — SIGNIFICANT CHANGE UP (ref -3–3)
BASE EXCESS BLDMV CALC-SCNC: 1.8 MMOL/L — SIGNIFICANT CHANGE UP (ref -3–3)
BASOPHILS # BLD AUTO: 0.03 K/UL — SIGNIFICANT CHANGE UP (ref 0–0.2)
BASOPHILS NFR BLD AUTO: 0.3 % — SIGNIFICANT CHANGE UP (ref 0–2)
BILIRUB SERPL-MCNC: 0.6 MG/DL — SIGNIFICANT CHANGE UP (ref 0.2–1.2)
BUN SERPL-MCNC: 109 MG/DL — HIGH (ref 7–23)
BUN SERPL-MCNC: 110 MG/DL — HIGH (ref 7–23)
CALCIUM SERPL-MCNC: 9 MG/DL — SIGNIFICANT CHANGE UP (ref 8.4–10.5)
CALCIUM SERPL-MCNC: 9.1 MG/DL — SIGNIFICANT CHANGE UP (ref 8.4–10.5)
CHLORIDE SERPL-SCNC: 102 MMOL/L — SIGNIFICANT CHANGE UP (ref 96–108)
CHLORIDE SERPL-SCNC: 99 MMOL/L — SIGNIFICANT CHANGE UP (ref 96–108)
CO2 BLDMV-SCNC: 26 MMOL/L — SIGNIFICANT CHANGE UP (ref 21–29)
CO2 SERPL-SCNC: 20 MMOL/L — LOW (ref 22–31)
CO2 SERPL-SCNC: 21 MMOL/L — LOW (ref 22–31)
CREAT SERPL-MCNC: 3.01 MG/DL — HIGH (ref 0.5–1.3)
CREAT SERPL-MCNC: 3.21 MG/DL — HIGH (ref 0.5–1.3)
EOSINOPHIL # BLD AUTO: 0.46 K/UL — SIGNIFICANT CHANGE UP (ref 0–0.5)
EOSINOPHIL NFR BLD AUTO: 3.9 % — SIGNIFICANT CHANGE UP (ref 0–6)
GAS PNL BLDA: SIGNIFICANT CHANGE UP
GAS PNL BLDMV: SIGNIFICANT CHANGE UP
GLUCOSE SERPL-MCNC: 194 MG/DL — HIGH (ref 70–99)
GLUCOSE SERPL-MCNC: 217 MG/DL — HIGH (ref 70–99)
HCO3 BLDMV-SCNC: 25 MMOL/L — SIGNIFICANT CHANGE UP (ref 20–28)
HCT VFR BLD CALC: 25.2 % — LOW (ref 34.5–45)
HCT VFR BLD CALC: 25.3 % — LOW (ref 34.5–45)
HGB BLD-MCNC: 7.9 G/DL — LOW (ref 11.5–15.5)
HGB BLD-MCNC: 7.9 G/DL — LOW (ref 11.5–15.5)
HOROWITZ INDEX BLDMV+IHG-RTO: 21 — SIGNIFICANT CHANGE UP
IMM GRANULOCYTES NFR BLD AUTO: 1.6 % — HIGH (ref 0–1.5)
INR BLD: 1.41 RATIO — HIGH (ref 0.88–1.16)
LACTATE SERPL-SCNC: 1.3 MMOL/L — SIGNIFICANT CHANGE UP (ref 0.7–2)
LYMPHOCYTES # BLD AUTO: 1.71 K/UL — SIGNIFICANT CHANGE UP (ref 1–3.3)
LYMPHOCYTES # BLD AUTO: 14.4 % — SIGNIFICANT CHANGE UP (ref 13–44)
MAGNESIUM SERPL-MCNC: 2.5 MG/DL — SIGNIFICANT CHANGE UP (ref 1.6–2.6)
MAGNESIUM SERPL-MCNC: 2.6 MG/DL — SIGNIFICANT CHANGE UP (ref 1.6–2.6)
MCHC RBC-ENTMCNC: 26.4 PG — LOW (ref 27–34)
MCHC RBC-ENTMCNC: 26.5 PG — LOW (ref 27–34)
MCHC RBC-ENTMCNC: 31.2 GM/DL — LOW (ref 32–36)
MCHC RBC-ENTMCNC: 31.3 GM/DL — LOW (ref 32–36)
MCV RBC AUTO: 84.6 FL — SIGNIFICANT CHANGE UP (ref 80–100)
MCV RBC AUTO: 84.6 FL — SIGNIFICANT CHANGE UP (ref 80–100)
MONOCYTES # BLD AUTO: 0.99 K/UL — HIGH (ref 0–0.9)
MONOCYTES NFR BLD AUTO: 8.3 % — SIGNIFICANT CHANGE UP (ref 2–14)
NEUTROPHILS # BLD AUTO: 8.51 K/UL — HIGH (ref 1.8–7.4)
NEUTROPHILS NFR BLD AUTO: 71.5 % — SIGNIFICANT CHANGE UP (ref 43–77)
NRBC # BLD: 0 /100 WBCS — SIGNIFICANT CHANGE UP (ref 0–0)
NRBC # BLD: 0 /100 WBCS — SIGNIFICANT CHANGE UP (ref 0–0)
O2 CT VFR BLD CALC: 32 MMHG — SIGNIFICANT CHANGE UP (ref 30–65)
O2 CT VFR BLD CALC: 34 MMHG — SIGNIFICANT CHANGE UP (ref 30–65)
O2 CT VFR BLD CALC: 35 MMHG — SIGNIFICANT CHANGE UP (ref 30–65)
PCO2 BLDMV: 36 MMHG — SIGNIFICANT CHANGE UP (ref 30–65)
PCO2 BLDMV: 37 MMHG — SIGNIFICANT CHANGE UP (ref 30–65)
PCO2 BLDMV: 38 MMHG — SIGNIFICANT CHANGE UP (ref 30–65)
PH BLDMV: 7.43 — SIGNIFICANT CHANGE UP (ref 7.32–7.45)
PH BLDMV: 7.44 — SIGNIFICANT CHANGE UP (ref 7.32–7.45)
PH BLDMV: 7.45 — SIGNIFICANT CHANGE UP (ref 7.32–7.45)
PHOSPHATE SERPL-MCNC: 4.3 MG/DL — SIGNIFICANT CHANGE UP (ref 2.5–4.5)
PHOSPHATE SERPL-MCNC: 4.6 MG/DL — HIGH (ref 2.5–4.5)
PLATELET # BLD AUTO: 235 K/UL — SIGNIFICANT CHANGE UP (ref 150–400)
PLATELET # BLD AUTO: 250 K/UL — SIGNIFICANT CHANGE UP (ref 150–400)
POTASSIUM SERPL-MCNC: 3.4 MMOL/L — LOW (ref 3.5–5.3)
POTASSIUM SERPL-MCNC: 4.8 MMOL/L — SIGNIFICANT CHANGE UP (ref 3.5–5.3)
POTASSIUM SERPL-SCNC: 3.4 MMOL/L — LOW (ref 3.5–5.3)
POTASSIUM SERPL-SCNC: 4.8 MMOL/L — SIGNIFICANT CHANGE UP (ref 3.5–5.3)
PROT SERPL-MCNC: 6.9 G/DL — SIGNIFICANT CHANGE UP (ref 6–8.3)
PROTHROM AB SERPL-ACNC: 16.3 SEC — HIGH (ref 10–12.9)
RBC # BLD: 2.98 M/UL — LOW (ref 3.8–5.2)
RBC # BLD: 2.99 M/UL — LOW (ref 3.8–5.2)
RBC # FLD: 18.4 % — HIGH (ref 10.3–14.5)
RBC # FLD: 18.6 % — HIGH (ref 10.3–14.5)
SAO2 % BLDMV: 52 % — LOW (ref 60–90)
SAO2 % BLDMV: 55 % — LOW (ref 60–90)
SAO2 % BLDMV: 56 % — LOW (ref 60–90)
SODIUM SERPL-SCNC: 137 MMOL/L — SIGNIFICANT CHANGE UP (ref 135–145)
SODIUM SERPL-SCNC: 139 MMOL/L — SIGNIFICANT CHANGE UP (ref 135–145)
TROPONIN T, HIGH SENSITIVITY RESULT: 2073 NG/L — HIGH (ref 0–51)
WBC # BLD: 11.28 K/UL — HIGH (ref 3.8–10.5)
WBC # BLD: 11.89 K/UL — HIGH (ref 3.8–10.5)
WBC # FLD AUTO: 11.28 K/UL — HIGH (ref 3.8–10.5)
WBC # FLD AUTO: 11.89 K/UL — HIGH (ref 3.8–10.5)

## 2019-10-10 PROCEDURE — 71045 X-RAY EXAM CHEST 1 VIEW: CPT | Mod: 26

## 2019-10-10 PROCEDURE — 99223 1ST HOSP IP/OBS HIGH 75: CPT | Mod: GC

## 2019-10-10 PROCEDURE — 93010 ELECTROCARDIOGRAM REPORT: CPT

## 2019-10-10 PROCEDURE — 99291 CRITICAL CARE FIRST HOUR: CPT

## 2019-10-10 RX ORDER — LIDOCAINE HCL 20 MG/ML
80 VIAL (ML) INJECTION ONCE
Refills: 0 | Status: COMPLETED | OUTPATIENT
Start: 2019-10-10 | End: 2019-10-10

## 2019-10-10 RX ORDER — ADENOSINE 3 MG/ML
6 INJECTION INTRAVENOUS ONCE
Refills: 0 | Status: COMPLETED | OUTPATIENT
Start: 2019-10-10 | End: 2019-10-10

## 2019-10-10 RX ORDER — CEFEPIME 1 G/1
1000 INJECTION, POWDER, FOR SOLUTION INTRAMUSCULAR; INTRAVENOUS EVERY 24 HOURS
Refills: 0 | Status: COMPLETED | OUTPATIENT
Start: 2019-10-10 | End: 2019-10-13

## 2019-10-10 RX ORDER — POTASSIUM CHLORIDE 20 MEQ
40 PACKET (EA) ORAL ONCE
Refills: 0 | Status: COMPLETED | OUTPATIENT
Start: 2019-10-10 | End: 2019-10-10

## 2019-10-10 RX ORDER — INSULIN GLARGINE 100 [IU]/ML
25 INJECTION, SOLUTION SUBCUTANEOUS AT BEDTIME
Refills: 0 | Status: DISCONTINUED | OUTPATIENT
Start: 2019-10-10 | End: 2019-10-10

## 2019-10-10 RX ORDER — METOPROLOL TARTRATE 50 MG
12.5 TABLET ORAL
Refills: 0 | Status: DISCONTINUED | OUTPATIENT
Start: 2019-10-10 | End: 2019-10-12

## 2019-10-10 RX ORDER — POTASSIUM CHLORIDE 20 MEQ
20 PACKET (EA) ORAL
Refills: 0 | Status: COMPLETED | OUTPATIENT
Start: 2019-10-10 | End: 2019-10-10

## 2019-10-10 RX ORDER — LANOLIN ALCOHOL/MO/W.PET/CERES
5 CREAM (GRAM) TOPICAL AT BEDTIME
Refills: 0 | Status: DISCONTINUED | OUTPATIENT
Start: 2019-10-10 | End: 2019-10-18

## 2019-10-10 RX ORDER — INSULIN GLARGINE 100 [IU]/ML
30 INJECTION, SOLUTION SUBCUTANEOUS AT BEDTIME
Refills: 0 | Status: DISCONTINUED | OUTPATIENT
Start: 2019-10-10 | End: 2019-10-12

## 2019-10-10 RX ORDER — POTASSIUM CHLORIDE 20 MEQ
20 PACKET (EA) ORAL
Refills: 0 | Status: COMPLETED | OUTPATIENT
Start: 2019-10-10 | End: 2019-10-11

## 2019-10-10 RX ORDER — CEFEPIME 1 G/1
1000 INJECTION, POWDER, FOR SOLUTION INTRAMUSCULAR; INTRAVENOUS EVERY 24 HOURS
Refills: 0 | Status: DISCONTINUED | OUTPATIENT
Start: 2019-10-10 | End: 2019-10-10

## 2019-10-10 RX ORDER — NITROGLYCERIN 6.5 MG
25 CAPSULE, EXTENDED RELEASE ORAL
Qty: 50 | Refills: 0 | Status: DISCONTINUED | OUTPATIENT
Start: 2019-10-10 | End: 2019-10-10

## 2019-10-10 RX ORDER — INSULIN LISPRO 100/ML
8 VIAL (ML) SUBCUTANEOUS
Refills: 0 | Status: DISCONTINUED | OUTPATIENT
Start: 2019-10-10 | End: 2019-10-12

## 2019-10-10 RX ADMIN — ADENOSINE 6 MILLIGRAM(S): 3 INJECTION INTRAVENOUS at 15:30

## 2019-10-10 RX ADMIN — Medication 100 MILLIGRAM(S): at 06:17

## 2019-10-10 RX ADMIN — HEPARIN SODIUM 300 UNIT(S)/HR: 5000 INJECTION INTRAVENOUS; SUBCUTANEOUS at 19:55

## 2019-10-10 RX ADMIN — Medication 50 MILLIEQUIVALENT(S): at 02:07

## 2019-10-10 RX ADMIN — Medication 50 MILLIEQUIVALENT(S): at 03:34

## 2019-10-10 RX ADMIN — PANTOPRAZOLE SODIUM 40 MILLIGRAM(S): 20 TABLET, DELAYED RELEASE ORAL at 06:17

## 2019-10-10 RX ADMIN — Medication 50 MILLIEQUIVALENT(S): at 00:22

## 2019-10-10 RX ADMIN — SENNA PLUS 2 TABLET(S): 8.6 TABLET ORAL at 22:03

## 2019-10-10 RX ADMIN — Medication 80 MILLILITER(S): at 15:22

## 2019-10-10 RX ADMIN — CHLORHEXIDINE GLUCONATE 1 APPLICATION(S): 213 SOLUTION TOPICAL at 06:17

## 2019-10-10 RX ADMIN — Medication 6: at 12:25

## 2019-10-10 RX ADMIN — Medication 100 MILLIGRAM(S): at 16:44

## 2019-10-10 RX ADMIN — Medication 40 MILLIEQUIVALENT(S): at 22:04

## 2019-10-10 RX ADMIN — Medication 5 MILLIGRAM(S): at 23:24

## 2019-10-10 RX ADMIN — POLYETHYLENE GLYCOL 3350 17 GRAM(S): 17 POWDER, FOR SOLUTION ORAL at 10:40

## 2019-10-10 RX ADMIN — Medication 4: at 16:44

## 2019-10-10 RX ADMIN — INSULIN GLARGINE 30 UNIT(S): 100 INJECTION, SOLUTION SUBCUTANEOUS at 22:03

## 2019-10-10 RX ADMIN — Medication 50 MICROGRAM(S): at 06:17

## 2019-10-10 RX ADMIN — Medication 2: at 08:10

## 2019-10-10 RX ADMIN — Medication 12.5 MILLIGRAM(S): at 17:06

## 2019-10-10 RX ADMIN — HEPARIN SODIUM 400 UNIT(S)/HR: 5000 INJECTION INTRAVENOUS; SUBCUTANEOUS at 12:15

## 2019-10-10 RX ADMIN — ADENOSINE 6 MILLIGRAM(S): 3 INJECTION INTRAVENOUS at 17:00

## 2019-10-10 RX ADMIN — CEFEPIME 100 MILLIGRAM(S): 1 INJECTION, POWDER, FOR SOLUTION INTRAMUSCULAR; INTRAVENOUS at 10:40

## 2019-10-10 RX ADMIN — HEPARIN SODIUM 0 UNIT(S)/HR: 5000 INJECTION INTRAVENOUS; SUBCUTANEOUS at 19:23

## 2019-10-10 RX ADMIN — CLOPIDOGREL BISULFATE 75 MILLIGRAM(S): 75 TABLET, FILM COATED ORAL at 10:40

## 2019-10-10 RX ADMIN — Medication 8 UNIT(S): at 16:44

## 2019-10-10 RX ADMIN — Medication 6 UNIT(S): at 08:11

## 2019-10-10 RX ADMIN — Medication 81 MILLIGRAM(S): at 10:40

## 2019-10-10 RX ADMIN — HEPARIN SODIUM 400 UNIT(S)/HR: 5000 INJECTION INTRAVENOUS; SUBCUTANEOUS at 06:28

## 2019-10-10 RX ADMIN — Medication 50 MILLIEQUIVALENT(S): at 22:21

## 2019-10-10 RX ADMIN — Medication 6 UNIT(S): at 12:26

## 2019-10-10 NOTE — CONSULT NOTE ADULT - PROBLEM SELECTOR RECOMMENDATION 4
Will continue home synthroid dose, will continue monitoring and FU.
- s/p mitraclip 6/2019  - echo this adm with mean grad of 4, no evidence of significant mitral stenosis

## 2019-10-10 NOTE — CONSULT NOTE ADULT - ATTENDING COMMENTS
Briefly, 74 yo F with HTN, HLD, DM2 (A1c 7.5 10/19), CAD s/p CABG (LIMA to LAD, SVG to OM, SVG to PDA) and prior PCI, severe mitral regurgitation s/p mitral clip (6/19), severe pulmonary HTN, CKD IV (b/l Cr 2-2.2), hypothyroidism presented with two days of SOB, admitted for NSTEMI and ADHF, with lactic acidosis to 10 and acute on chronic kidney injury. Also noted to have elevated troponins (0331-0723) with new RBBB on ECG w/ ST depressions but LHC deferred d/t CKD. Underwent RHC which showed elevated filling pressures and borderline low CI (2.3) with /80s s/p IABP. Was diuresed with improvement in filling pressures but has persistent renal dysfunction. Diuretics held since yesterday and filling pressures currently within normal limits. BP borderline when IABP on hold but CI appears to be adequate (2.7). Would attempt IABP wean.   - maintain CVP 6-8; likely will need bumex 4 mg as needed given degree of renal dysfunction  - if CI drops with IABP wean, can try dobutamine  - high risk of dialysis if were to proceed with LHC but it is likely she had an ischemic event  - overall poor prognosis; recommend palliative care team  - will sign off; please call w/ questions      HD 10/10/19: CVP 5-6, PA 46/14 (24), mixed 55%, CO 4.28. CI 2.9, SVR 1000

## 2019-10-10 NOTE — DIETITIAN INITIAL EVALUATION ADULT. - ENERGY NEEDS
Ht: 59"  Wt: 116.8 lbs  BMI: 23.6 kg/m2   IBW: 97 lbs (+/-10%)    120 % IBW  Edema: none noted           Skin: no pressure injuries noted

## 2019-10-10 NOTE — PROGRESS NOTE ADULT - SUBJECTIVE AND OBJECTIVE BOX
[facility]   MD MEGHANA SHAH D.O. FATIMA SHEIKH, D.O. RUORU WONG, PA    OFFICE: [phone]  DR SANTOYO CELL: [phone]  DR. ALAS CELL: [phone]  DR. RIDER CELL: [phone]  RASHIDA KENDALL CELL: [phone]      RENAL FOLLOW UP NOTE  --------------------------------------------------------------------------------  HPI: Pt seen and examined this AM. Reports she is still feeling SOB this AM. Has mild cough. No nausea, vomiting.        PAST HISTORY  --------------------------------------------------------------------------------  No significant changes to PMH, PSH, FHx, SHx, unless otherwise noted    ALLERGIES & MEDICATIONS  --------------------------------------------------------------------------------  Allergies    azithromycin (Hives; Pruritus)    Intolerances      Standing Inpatient Medications  aspirin enteric coated 81 milliGRAM(s) Oral daily  cefepime   IVPB 1000 milliGRAM(s) IV Intermittent every 24 hours  chlorhexidine 2% Cloths 1 Application(s) Topical <User Schedule>  clopidogrel Tablet 75 milliGRAM(s) Oral daily  dextrose 5%. 1000 milliLiter(s) IV Continuous <Continuous>  dextrose 50% Injectable 12.5 Gram(s) IV Push once  dextrose 50% Injectable 25 Gram(s) IV Push once  dextrose 50% Injectable 25 Gram(s) IV Push once  docusate sodium 100 milliGRAM(s) Oral two times a day  heparin  Infusion.  Unit(s)/Hr IV Continuous <Continuous>  insulin glargine Injectable (LANTUS) 25 Unit(s) SubCutaneous at bedtime  insulin lispro (HumaLOG) corrective regimen sliding scale   SubCutaneous at bedtime  insulin lispro (HumaLOG) corrective regimen sliding scale   SubCutaneous three times a day before meals  insulin lispro Injectable (HumaLOG) 6 Unit(s) SubCutaneous three times a day before meals  levothyroxine 50 MICROGram(s) Oral daily  pantoprazole    Tablet 40 milliGRAM(s) Oral before breakfast  polyethylene glycol 3350 17 Gram(s) Oral daily  senna 2 Tablet(s) Oral at bedtime    PRN Inpatient Medications  ALPRAZolam 0.25 milliGRAM(s) Oral three times a day PRN  dextrose 40% Gel 15 Gram(s) Oral once PRN  glucagon  Injectable 1 milliGRAM(s) IntraMuscular once PRN  heparin  Injectable 3200 Unit(s) IV Push every 6 hours PRN      REVIEW OF SYSTEMS  --------------------------------------------------------------------------------  General: no fever  CVS: no chest pain  RESP: no sob, no cough  ABD: no abdominal pain  : no dysuria,  MSK: no edema     VITALS/PHYSICAL EXAM  --------------------------------------------------------------------------------  T(C): 36.6 (10-10-19 @ 05:00), Max: 36.7 (10-09-19 @ 11:00)  HR: 66 (10-10-19 @ 10:00) (58 - 74)  BP: --  RR: 19 (10-10-19 @ 10:00) (16 - 36)  SpO2: 96% (10-10-19 @ 10:00) (94% - 100%)  Wt(kg): --        10-09-19 @ 07:01  -  10-10-19 @ 07:00  --------------------------------------------------------  IN: 1107 mL / OUT: 3295 mL / NET: -2188 mL    10-10-19 @ 07:01  -  10-10-19 @ 10:48  --------------------------------------------------------  IN: 112 mL / OUT: 525 mL / NET: -413 mL      Physical Exam:  	Gen: NAD  	HEENT: MMM  	Pulm: Diffuse rhonchi throughout, minimal end-expiratory wheezes B/L  	CV: S1S2, + murmur  	Abd: Soft, +BS  	Ext: No LE edema B/L              Neuro: Awake   	Skin: Warm and Dry      LABS/STUDIES  --------------------------------------------------------------------------------              7.9    11.89 >-----------<  250      [10-10-19 @ 06:08]              25.3     139  |  102  |  109  ----------------------------<  194      [10-10-19 @ 06:08]  4.8   |  21  |  3.01        Ca     9.1     [10-10-19 @ 06:08]      Mg     2.6     [10-10-19 @ 06:08]      Phos  4.3     [10-10-19 @ 06:08]    TPro  6.9  /  Alb  3.2  /  TBili  0.6  /  DBili  x   /  AST  1457  /  ALT  1803  /  AlkPhos  141  [10-10-19 @ 06:08]    PT/INR: PT 16.3 , INR 1.41       [10-10-19 @ 06:08]  PTT: 50.1       [10-10-19 @ 06:08]          [10-08-19 @ 14:01]    Creatinine Trend:  SCr 3.01 [10-10 @ 06:08]  SCr 3.05 [10-09 @ 23:12]  SCr 2.90 [10-09 @ 05:00]  SCr 2.86 [10-08 @ 23:21]  SCr 2.92 [10-08 @ 22:53]    Urinalysis - [10-08-19 @ 02:35]      Color Yellow / Appearance Clear / SG 1.016 / pH 5.5      Gluc Trace / Ketone Negative  / Bili Negative / Urobili Negative       Blood Negative / Protein 30 mg/dL / Leuk Est Negative / Nitrite Negative      RBC 1 / WBC 0 / Hyaline 6 / Gran 3-5 / Sq Epi  / Non Sq Epi 2 / Bacteria Negative    Urine Creatinine 20      [10-08-19 @ 20:53]  Urine Protein 20      [10-08-19 @ 23:13]  Urine Sodium 67      [10-08-19 @ 20:53]  Urine Osmolality 318      [10-08-19 @ 21:27]    Iron 42, TIBC 348, %sat 12      [07-05-19 @ 22:32]  Ferritin 130      [07-05-19 @ 22:32]  .4 (Ca --)      [04-25-19 @ 05:45]   --  Vitamin D (25OH) 25.9      [05-01-19 @ 04:00]  HbA1c 7.5      [10-08-19 @ 14:30]  TSH 1.01      [10-08-19 @ 14:47]  Lipid: chol 83, , HDL 14, LDL 46      [10-08-19 @ 14:48]

## 2019-10-10 NOTE — CONSULT NOTE ADULT - PROBLEM SELECTOR RECOMMENDATION 5
Monitor labs, Renal FU
- CAD s/p CABG  - p/w CP, trop 1800 -> 2000  - [facility] once MERVIN resolves

## 2019-10-10 NOTE — DIETITIAN INITIAL EVALUATION ADULT. - PERTINENT MEDS FT
Ecotrin, Plavix, Heparin, Cefepime, Lantus, Humalog, Xanax, Colace, Synthroid, Protonix, Miralax, Senna

## 2019-10-10 NOTE — DIETITIAN INITIAL EVALUATION ADULT. - DIET TYPE
PO diet D/C DASH/TLC; Fluids deferred to team/low sodium/consistent carbohydrate (evening snack)/Halal

## 2019-10-10 NOTE — PROGRESS NOTE ADULT - SUBJECTIVE AND OBJECTIVE BOX
====================  CCU MIDNIGHT ROUNDS  ====================    SELVIN CLAIRE  [id number]  Patient is a 73y old  Female who presents with a chief complaint of Acute decompensated heart failure, SOB (10 Oct 2019 15:58)    ====================  SUMMARY: 74 y/o F w/ PMH of HTN, HLD, DM2, GERD, CAD s/p CABG (LIMA to LAD, SVG to OM, SVG to PDA; 2014), severe MR s/p MitraClip, severe pHTN, CKD4, hypoTH c/o dyspnea x 2D suspect 2/2 NSTEMI +/- ADHF w/ e/o organ hypoperfusion.  ====================        ====================  NEW EVENTS: Patient seen and examined at bedside. Vital signs stable overnight. Patient denies headache, dizziness, chest/abd pain, shortness of breath. ROS negative unless otherwise stated.   ====================        ====================  Vital Signs (last 12 hrs):  ====================    T(C): 36.6 (10-10-19 @ 19:00), Max: 36.6 (10-10-19 @ 19:00)  T(F): 97.9 (10-10-19 @ 19:00), Max: 97.9 (10-10-19 @ 19:00)  HR: 64 (10-10-19 @ 22:00) (62 - 152)  BP: --  BP(mean): --  ABP: 170/30 (10-10-19 @ 22:00) (139/33 - 170/30)  ABP(mean): 92 (10-10-19 @ 22:00) (76 - 92)  RR: 20 (10-10-19 @ 22:00) (14 - 22)  SpO2: 99% (10-10-19 @ 22:00) (95% - 100%)  Wt(kg): --  CVP(mm Hg): 8 (10-10-19 @ 22:00) (1 - 8)  CVP(cm H2O): --  CO: --  CI: --  PA: 54/21 (10-10-19 @ 22:00) (30/8 - 54/21)  PA(mean): 33 (10-10-19 @ 22:00) (15 - 33)  PCWP: --  SVR: --  PVR: --    I&O's Summary    09 Oct 2019 07:01  -  10 Oct 2019 07:00  --------------------------------------------------------  IN: 1107 mL / OUT: 3295 mL / NET: -2188 mL    10 Oct 2019 07:01  -  10 Oct 2019 22:23  --------------------------------------------------------  IN: 512 mL / OUT: 1560 mL / NET: -1048 mL            ====================  NEW LABS:  ====================                        7.9    11.28 )-----------( 235      ( 10 Oct 2019 18:27 )             25.2     10-10    137  |  99  |  110<H>  ----------------------------<  217<H>  3.4<L>   |  20<L>  |  3.21<H>    Ca    9.0      10 Oct 2019 18:27  Phos  4.6     10-10  Mg     2.5     10-10    TPro  6.9  /  Alb  3.2<L>  /  TBili  0.6  /  DBili  x   /  AST  1457<H>  /  ALT  1803<H>  /  AlkPhos  141<H>  10-10    PT/INR - ( 10 Oct 2019 06:08 )   PT: 16.3 sec;   INR: 1.41 ratio         PTT - ( 10 Oct 2019 18:27 )  PTT:87.8 sec      ABG - ( 10 Oct 2019 06:01 )  pH, Arterial: 7.46  pH, Blood: x     /  pCO2: 34    /  pO2: 80    / HCO3: 24    / Base Excess: 1.0   /  SaO2: 95                Blood Gas Source, Mixed: Mixed Blood Gas (10-10-19 @ 18:25)  Blood Gas Source, Mixed: Mixed Blood Gas (10-10-19 @ 06:01)  Blood Gas Source, Mixed: Mixed Blood Gas (10-09-19 @ 23:12)      ====================  Assessment & Plan:    #CV  Vessels - NSTEMI. C/w ASA/clopidogrel. C/w hep gtt. C/w vol optimize. S/p IABP to improve coronary perfusion. Plan for LHC when Cr improves. LORENE score is 185. S/p nitro gtt now. Trend cTn until peaks.  Pump - Severely reduced LVEF. S/p bumetanide gtt; Strict Is/Os. PA Cath in place; serial perfusion labs.   Valves - S/p MitraClip. No active issues.  Rhythm - C/w monitor.    #Neuro - No active issues. Pain control PRN.  #Resp - Vol optimized. SpO2 well on RA now.  #GI - Heart healthy diet. Trend liver tests; improving w/ improving vol status.  #Endo - Trend BGFS; cover PRN. May need insulin gtt if runs consistently hyperglycemic. C/w home med Synthroid. Check TFTs. Get endo c/s.  #Renal - Vol optimize, as above. MERVIN likely 2/2 acute congestion.  #ID - Leukocytosis suspect 2/2 PNA. C/w cefepime; hold vanco. Complete 7D total course of abxs.  #Hem - Full dose heparin, as above.  #Ethics - FC. C/w care in CCU.    José Daniels MD PGY-2  Department of Internal Medicine  Pager: [phone] (NS) /[phone] (EMILIE)     ====================

## 2019-10-10 NOTE — DIETITIAN INITIAL EVALUATION ADULT. - ADD RECOMMEND
Yes 1) Add to add diet health shakes TID with meals. 2) Recommend renal multivitamin. 3) Encourage and monitor PO intake. Encourage use of daily menus to promote optimal PO intake potential. 4) Obtain further subjective wt/diet history from pt/family as able. 5) Diet education deferred at this time. RD to remain available as per request of pt/medical team.

## 2019-10-10 NOTE — CONSULT NOTE ADULT - ASSESSMENT
Assessment  DMT2: 73y Female with DM T2 with hyperglycemia, A1C 7.5%, recently hospitalized and required large-doses of insulin, on insulin at home, now readmitted and on basal bolus insulin, blood sugars running high, no hypoglycemic episode, eating partial meals, non compliant with low carb diet.  SOB: on meds, FU Heart Failure team.  CAD: S/P CABG previous admission, on medications, no chest pain, stable, monitored.  Hypothyroidism: On Synthroid 50 mcg po daily, compliant with Synthroid intake, asymptomatic.  HTN: Controlled,  on antihypertensive medications.  CKD: Monitor labs/BMP,           Jillian Banks MD  Cell: [phone]  Office: [phone]

## 2019-10-10 NOTE — CONSULT NOTE ADULT - PROBLEM SELECTOR RECOMMENDATION 3
On medications,  no chest pain, stable, monitored and followed up by primary cardiothoracic team/cardiology team.
- no fevers or symptoms  - check all cultures  - on empiric abx

## 2019-10-10 NOTE — DIETITIAN INITIAL EVALUATION ADULT. - PERTINENT LABORATORY DATA
(10/10): Hgb 7.9, Hct 25.3; POCT blood glucose 258, 195; Troponin 2073; , Cr 3.01, Glu 194, Alb 3.2, GFR 15; (9/5): A1c 8.7%

## 2019-10-10 NOTE — DIETITIAN INITIAL EVALUATION ADULT. - REASON INDICATOR FOR ASSESSMENT
Nutrition Assessment warranted for length of stay.  Information obtained from: RN, comprehensive chart review, patient, prior RD notes

## 2019-10-10 NOTE — PROGRESS NOTE ADULT - SUBJECTIVE AND OBJECTIVE BOX
PATIENT:  SELVIN CLAIRE  [id number]    CHIEF COMPLAINT:  Patient is a 73y old  Female who presents with a chief complaint of Acute decompensated heart failure, SOB (09 Oct 2019 22:05)      INTERVAL HISTORY/OVERNIGHT EVENTS: No acute events overnight. Did not require Bumex as UOP 50 cc/hr. Lactate 1.7, hemodynamics CO/CI 4.8/3.3, CVP 3-4.       REVIEW OF SYSTEMS:    Constitutional:     [ ] negative [ ] fevers [ ] chills [ ] weight loss [ ] weight gain  HEENT:                  [ ] negative [ ] dry eyes [ ] eye irritation [ ] postnasal drip [ ] nasal congestion  CV:                         [ ] negative  [ ] chest pain [ ] orthopnea [ ] palpitations [ ] murmur  Resp:                     [ ] negative [ ] cough [ ] shortness of breath [ ] dyspnea [ ] wheezing [ ] sputum [ ] hemoptysis  GI:                          [ ] negative [ ] nausea [ ] vomiting [ ] diarrhea [ ] constipation [ ] abd pain [ ] dysphagia   :                        [ ] negative [ ] dysuria [ ] nocturia [ ] hematuria [ ] increased urinary frequency  Musculoskeletal: [ ] negative [ ] back pain [ ] myalgias [ ] arthralgias [ ] fracture  Skin:                       [ ] negative [ ] rash [ ] itch  Neurological:        [ ] negative [ ] headache [ ] dizziness [ ] syncope [ ] weakness [ ] numbness  Psychiatric:           [ ] negative [ ] anxiety [ ] depression  Endocrine:            [ ] negative [ ] diabetes [ ] thyroid problem  Heme/Lymph:      [ ] negative [ ] anemia [ ] bleeding problem  Allergic/Immune: [ ] negative [ ] itchy eyes [ ] nasal discharge [ ] hives [ ] angioedema    [ ] All other systems negative  [ ] Unable to assess ROS because ________.    MEDICATIONS:  MEDICATIONS  (STANDING):  aspirin enteric coated 81 milliGRAM(s) Oral daily  cefepime   IVPB 1000 milliGRAM(s) IV Intermittent every 24 hours  chlorhexidine 2% Cloths 1 Application(s) Topical <User Schedule>  clopidogrel Tablet 75 milliGRAM(s) Oral daily  dextrose 5%. 1000 milliLiter(s) (50 mL/Hr) IV Continuous <Continuous>  dextrose 50% Injectable 12.5 Gram(s) IV Push once  dextrose 50% Injectable 25 Gram(s) IV Push once  dextrose 50% Injectable 25 Gram(s) IV Push once  docusate sodium 100 milliGRAM(s) Oral two times a day  heparin  Infusion.  Unit(s)/Hr (6.5 mL/Hr) IV Continuous <Continuous>  insulin glargine Injectable (LANTUS) 20 Unit(s) SubCutaneous at bedtime  insulin lispro (HumaLOG) corrective regimen sliding scale   SubCutaneous at bedtime  insulin lispro (HumaLOG) corrective regimen sliding scale   SubCutaneous three times a day before meals  insulin lispro Injectable (HumaLOG) 6 Unit(s) SubCutaneous three times a day before meals  levothyroxine 50 MICROGram(s) Oral daily  pantoprazole    Tablet 40 milliGRAM(s) Oral before breakfast  polyethylene glycol 3350 17 Gram(s) Oral daily  senna 2 Tablet(s) Oral at bedtime    MEDICATIONS  (PRN):  ALPRAZolam 0.25 milliGRAM(s) Oral three times a day PRN Anxiety  dextrose 40% Gel 15 Gram(s) Oral once PRN Blood Glucose LESS THAN 70 milliGRAM(s)/deciliter  glucagon  Injectable 1 milliGRAM(s) IntraMuscular once PRN Glucose LESS THAN 70 milligrams/deciliter  heparin  Injectable 3200 Unit(s) IV Push every 6 hours PRN For aPTT less than 40      ALLERGIES:  Allergies    azithromycin (Hives; Pruritus)    Intolerances        OBJECTIVE:  ICU Vital Signs Last 24 Hrs  T(C): 36.6 (10 Oct 2019 05:00), Max: 36.7 (09 Oct 2019 11:00)  T(F): 97.9 (10 Oct 2019 05:00), Max: 98.1 (09 Oct 2019 11:00)  HR: 66 (10 Oct 2019 06:00) (58 - 75)  BP: --  BP(mean): --  ABP: --  ABP(mean): --  RR: 18 (10 Oct 2019 06:00) (16 - 36)  SpO2: 95% (10 Oct 2019 06:00) (94% - 100%)      Adult Advanced Hemodynamics Last 24 Hrs  CVP(mm Hg): 3 (10 Oct 2019 06:00) (-2 - 7)  CVP(cm H2O): --  CO: --  CI: --  PA: 47/11 (10 Oct 2019 06:00) (34/6 - 56/21)  PA(mean): 25 (10 Oct 2019 06:00) (15 - 34)  PCWP: --  SVR: --  SVRI: --  PVR: --  PVRI: --  CAPILLARY BLOOD GLUCOSE      POCT Blood Glucose.: 195 mg/dL (10 Oct 2019 07:16)  POCT Blood Glucose.: 306 mg/dL (09 Oct 2019 21:44)  POCT Blood Glucose.: 199 mg/dL (09 Oct 2019 16:54)  POCT Blood Glucose.: 198 mg/dL (09 Oct 2019 11:39)  POCT Blood Glucose.: 221 mg/dL (09 Oct 2019 08:14)    CAPILLARY BLOOD GLUCOSE      POCT Blood Glucose.: 195 mg/dL (10 Oct 2019 07:16)    I&O's Summary    09 Oct 2019 07:01  -  10 Oct 2019 07:00  --------------------------------------------------------  IN: 1107 mL / OUT: 3295 mL / NET: -2188 mL      Daily     Daily     PHYSICAL EXAMINATION:  General: WN/WD NAD  HEENT: PERRLA, EOMI, moist mucous membranes  Neurology: A&Ox3, nonfocal, CUADRA x 4  Respiratory: CTA B/L, normal respiratory effort, no wheezes, crackles, rales  CV: RRR, S1S2, no murmurs, rubs or gallops  Abdominal: Soft, NT, ND +BS,   Extremities: No edema, + peripheral pulses  Incisions:   Tubes:    LABS:  ABG - ( 10 Oct 2019 06:01 )  pH, Arterial: 7.46  pH, Blood: x     /  pCO2: 34    /  pO2: 80    / HCO3: 24    / Base Excess: 1.0   /  SaO2: 95                                      7.9    11.89 )-----------( 250      ( 10 Oct 2019 06:08 )             25.3     10-10    139  |  102  |  109<H>  ----------------------------<  194<H>  4.8   |  21<L>  |  3.01<H>    Ca    9.1      10 Oct 2019 06:08  Phos  4.3     10-10  Mg     2.6     10-10    TPro  6.9  /  Alb  3.2<L>  /  TBili  0.6  /  DBili  x   /  AST  1457<H>  /  ALT  1803<H>  /  AlkPhos  141<H>  10-10    LIVER FUNCTIONS - ( 10 Oct 2019 06:08 )  Alb: 3.2 g/dL / Pro: 6.9 g/dL / ALK PHOS: 141 U/L / ALT: 1803 U/L / AST: 1457 U/L / GGT: x           PT/INR - ( 10 Oct 2019 06:08 )   PT: 16.3 sec;   INR: 1.41 ratio         PTT - ( 10 Oct 2019 06:08 )  PTT:50.1 sec    CARDIAC MARKERS ( 08 Oct 2019 14:01 )  x     / x     / 149 U/L / x     / 11.1 ng/mL  CARDIAC MARKERS ( 08 Oct 2019 09:36 )  x     / x     / 169 U/L / x     / 12.1 ng/mL          TELEMETRY:     EKG:     IMAGING: PATIENT:  SELVIN CLAIRE  [id number]    CHIEF COMPLAINT:  Patient is a 73y old  Female who presents with a chief complaint of Acute decompensated heart failure, SOB (09 Oct 2019 22:05)      INTERVAL HISTORY/OVERNIGHT EVENTS: No acute events overnight. Did not require Bumex as UOP 50 cc/hr. Overnight pt continued to complain of pain, received oxycodone 5 mg at 8pm with good response. Pt seen and examined this morning. Does not have any complaints. No chest pain, shortness of breath.       REVIEW OF SYSTEMS:    Constitutional:     [x ] negative [ ] fevers [ ] chills [ ] weight loss [ ] weight gain  HEENT:                  [ x] negative [ ] dry eyes [ ] eye irritation [ ] postnasal drip [ ] nasal congestion  CV:                         [ x] negative  [ ] chest pain [ ] orthopnea [ ] palpitations [ ] murmur  Resp:                     [x ] negative [ ] cough [ ] shortness of breath [ ] dyspnea [ ] wheezing [ ] sputum [ ] hemoptysis  GI:                          [x ] negative [ ] nausea [ ] vomiting [ ] diarrhea [ ] constipation [ ] abd pain [ ] dysphagia   :                        [x ] negative [ ] dysuria [ ] nocturia [ ] hematuria [ ] increased urinary frequency  Musculoskeletal: [ x] negative [ ] back pain [ ] myalgias [ ] arthralgias [ ] fracture  Skin:                       [ x] negative [ ] rash [ ] itch  Neurological:        [x ] negative [ ] headache [ ] dizziness [ ] syncope [ ] weakness [ ] numbness  Psychiatric:           [x ] negative [ ] anxiety [ ] depression  Endocrine:            [ x] negative [ ] diabetes [ ] thyroid problem  Heme/Lymph:      [x ] negative [ ] anemia [ ] bleeding problem  Allergic/Immune: [ x] negative [ ] itchy eyes [ ] nasal discharge [ ] hives [ ] angioedema    [ ] All other systems negative  [ ] Unable to assess ROS because ________.    MEDICATIONS:  MEDICATIONS  (STANDING):  aspirin enteric coated 81 milliGRAM(s) Oral daily  cefepime   IVPB 1000 milliGRAM(s) IV Intermittent every 24 hours  chlorhexidine 2% Cloths 1 Application(s) Topical <User Schedule>  clopidogrel Tablet 75 milliGRAM(s) Oral daily  dextrose 5%. 1000 milliLiter(s) (50 mL/Hr) IV Continuous <Continuous>  dextrose 50% Injectable 12.5 Gram(s) IV Push once  dextrose 50% Injectable 25 Gram(s) IV Push once  dextrose 50% Injectable 25 Gram(s) IV Push once  docusate sodium 100 milliGRAM(s) Oral two times a day  heparin  Infusion.  Unit(s)/Hr (6.5 mL/Hr) IV Continuous <Continuous>  insulin glargine Injectable (LANTUS) 20 Unit(s) SubCutaneous at bedtime  insulin lispro (HumaLOG) corrective regimen sliding scale   SubCutaneous at bedtime  insulin lispro (HumaLOG) corrective regimen sliding scale   SubCutaneous three times a day before meals  insulin lispro Injectable (HumaLOG) 6 Unit(s) SubCutaneous three times a day before meals  levothyroxine 50 MICROGram(s) Oral daily  pantoprazole    Tablet 40 milliGRAM(s) Oral before breakfast  polyethylene glycol 3350 17 Gram(s) Oral daily  senna 2 Tablet(s) Oral at bedtime    MEDICATIONS  (PRN):  ALPRAZolam 0.25 milliGRAM(s) Oral three times a day PRN Anxiety  dextrose 40% Gel 15 Gram(s) Oral once PRN Blood Glucose LESS THAN 70 milliGRAM(s)/deciliter  glucagon  Injectable 1 milliGRAM(s) IntraMuscular once PRN Glucose LESS THAN 70 milligrams/deciliter  heparin  Injectable 3200 Unit(s) IV Push every 6 hours PRN For aPTT less than 40      ALLERGIES:  Allergies    azithromycin (Hives; Pruritus)    Intolerances        OBJECTIVE:  ICU Vital Signs Last 24 Hrs  T(C): 36.6 (10 Oct 2019 05:00), Max: 36.7 (09 Oct 2019 11:00)  T(F): 97.9 (10 Oct 2019 05:00), Max: 98.1 (09 Oct 2019 11:00)  HR: 66 (10 Oct 2019 06:00) (58 - 75)  BP: --  BP(mean): --  ABP: --  ABP(mean): --  RR: 18 (10 Oct 2019 06:00) (16 - 36)  SpO2: 95% (10 Oct 2019 06:00) (94% - 100%)      Adult Advanced Hemodynamics Last 24 Hrs  CVP(mm Hg): 3 (10 Oct 2019 06:00) (-2 - 7)  CVP(cm H2O): --  CO: --  CI: --  PA: 47/11 (10 Oct 2019 06:00) (34/6 - 56/21)  PA(mean): 25 (10 Oct 2019 06:00) (15 - 34)  PCWP: --  SVR: --  SVRI: --  PVR: --  PVRI: --  CAPILLARY BLOOD GLUCOSE      POCT Blood Glucose.: 195 mg/dL (10 Oct 2019 07:16)  POCT Blood Glucose.: 306 mg/dL (09 Oct 2019 21:44)  POCT Blood Glucose.: 199 mg/dL (09 Oct 2019 16:54)  POCT Blood Glucose.: 198 mg/dL (09 Oct 2019 11:39)  POCT Blood Glucose.: 221 mg/dL (09 Oct 2019 08:14)    CAPILLARY BLOOD GLUCOSE      POCT Blood Glucose.: 195 mg/dL (10 Oct 2019 07:16)    I&O's Summary    09 Oct 2019 07:01  -  10 Oct 2019 07:00  --------------------------------------------------------  IN: 1107 mL / OUT: 3295 mL / NET: -2188 mL      Daily     Daily     PHYSICAL EXAMINATION:  General: ill-appearing elderly woman lying in bed on room air, appears comfortable  HEENT: PERRLA, EOMI, moist mucous membranes  Respiratory: clear bilaterally   CV: RRR, S1S2, no murmurs, rubs or gallops  Abdominal: Soft, nontender, nondistended  Extremities: no edema, + peripheral pulses  Skin: right femoral balloon pump in place, appears clean/dry/intact, good pulses   Tubes: +Oscar       LABS:  ABG - ( 10 Oct 2019 06:01 )  pH, Arterial: 7.46  pH, Blood: x     /  pCO2: 34    /  pO2: 80    / HCO3: 24    / Base Excess: 1.0   /  SaO2: 95                                      7.9    11.89 )-----------( 250      ( 10 Oct 2019 06:08 )             25.3     10-10    139  |  102  |  109<H>  ----------------------------<  194<H>  4.8   |  21<L>  |  3.01<H>    Ca    9.1      10 Oct 2019 06:08  Phos  4.3     10-10  Mg     2.6     10-10    TPro  6.9  /  Alb  3.2<L>  /  TBili  0.6  /  DBili  x   /  AST  1457<H>  /  ALT  1803<H>  /  AlkPhos  141<H>  10-10    LIVER FUNCTIONS - ( 10 Oct 2019 06:08 )  Alb: 3.2 g/dL / Pro: 6.9 g/dL / ALK PHOS: 141 U/L / ALT: 1803 U/L / AST: 1457 U/L / GGT: x           PT/INR - ( 10 Oct 2019 06:08 )   PT: 16.3 sec;   INR: 1.41 ratio         PTT - ( 10 Oct 2019 06:08 )  PTT:50.1 sec    CARDIAC MARKERS ( 08 Oct 2019 14:01 )  x     / x     / 149 U/L / x     / 11.1 ng/mL  CARDIAC MARKERS ( 08 Oct 2019 09:36 )  x     / x     / 169 U/L / x     / 12.1 ng/mL          TELEMETRY:     EKG:     IMAGING:  < from: US Kidney and Bladder (10.09.19 @ 15:30) >  IMPRESSION:     Bilateral generalized increased cortical echogenicity, secondary to renal   parenchymal disease. Trace right perinephric fluid.    < end of copied text >

## 2019-10-10 NOTE — PROGRESS NOTE ADULT - ASSESSMENT
74 y/o F w/ PMH of HTN, HLD, DM2, GERD, CAD s/p CABG (LIMA to LAD, SVG to OM, SVG to PDA; 2014), severe MR s/p MitraClip, severe pHTN, CKD4, hypoTH c/o dyspnea x 2D suspect 2/2  NSTEMI +/- ADHF w/ e/o organ hypoperfusion.    #CV  Vessels - Elev troponin suspect possible NSTEMI. C/w ASA/clopidogrel. C/w hep gtt. C/w vol optimize. S/p IABP to improve coronary perfusion. Plan for LHC when Cr improves. LORENE score is 185. Would avoid hydral 2/2 possible steal; c/w nitro gtt.  Pump - Severe reduced LVEF. Bumetanide gtt d/c'ed. Oscar in place. Strict Is/Os. PA Cath in place; serial perfusion labs.  Valves - S/p MitraClip. No active issues.  Rhythm - C/w monitor.    #Neuro - No active issues  #Resp - Off supplemental O2, sating well on RA. Vol optimize, as above.  #GI - Heart healthy diet. Trend liver tests, downtrending   #Endo - Trend BGFS on Lantus 10 QHS and Humalog 3 units. C/w home med Synthroid.   #Renal - Vol optimize, as above. MERVIN likely 2/2 acute congestion, improving.  #ID - Leukocytosis. Sepsis w/u unremarkable thus far; c/w Cefepime given MERVIN.  #Hem - Full dose heparin, as above.  #Ethics - FC. C/w care in CCU 72 y/o F w/ PMH of HTN, HLD, DM2, GERD, CAD s/p CABG (LIMA to LAD, SVG to OM, SVG to PDA; 2014), severe MR s/p MitraClip, severe pHTN, CKD4, hypoTH c/o dyspnea x 2D suspect 2/2  NSTEMI +/- ADHF w/ e/o organ hypoperfusion.    #CV  Vessels - Elev troponin suspect possible NSTEMI. C/w ASA/clopidogrel. C/w hep gtt. C/w vol optimize. S/p IABP to improve coronary perfusion. Plan for LHC when Cr improves. LORENE score is 185. Would avoid hydral 2/2 possible steal. s/p nitro drip.   Pump - Severe reduced LVEF. Bumetanide gtt d/c'ed. Oscar in place. Strict Is/Os. PA Cath in place; serial perfusion labs.  Valves - S/p MitraClip. No active issues.  Rhythm - C/w monitor.    #Neuro - No active issues  #Resp - Off supplemental O2, sating well on RA. Vol optimize, as above.  #GI - Heart healthy diet. Trend liver tests, downtrending   #Endo - Trend BGFS on Lantus and Humalog. C/w home med Synthroid.   #Renal - Vol optimize, as above. MERVIN likely 2/2 acute congestion. continue to monitor creatinine now that vanco dc'd.   #ID - Leukocytosis. Sepsis w/u unremarkable thus far; c/w Cefepime given MERVIN.  #Hem - Full dose heparin, as above.  #Ethics - FC. C/w care in CCU

## 2019-10-10 NOTE — DIETITIAN INITIAL EVALUATION ADULT. - PHYSICAL APPEARANCE
Nutrition Focused Physical Assessment conducted with verbal consent. Pt noted with mild-moderate orbital depletion; mild-moderate muscle mass depletion to calves/thighs.

## 2019-10-10 NOTE — PROGRESS NOTE ADULT - ASSESSMENT
72 yo F w/ PMH of CKD stage 4 (baseline Cr 1.9-2.2) HTN, T2DM, CAD s/p CABG X3 (2014 at [facility]), non-dilated ICM (EF 20-25%), severe mitral regurgitation s/p mitral clip (6/13/19), severe pulm HTN, hypothyroidism who presented for evaluation of chest pain, cough, and SOB for the past 2 days and was admitted for ADHF. s/p IABP 10/8/19. Nephrology is following for MERVIN    MERVIN on CKD stage 4  - baseline Cr 1.9-2.2; has had recurrent MERVIN's over the years  - CKD is likely 2/2 recurrent MERVIN's + underlying DM/HTN  - MERVIN is likely 2/2 CRS     - admitted w/ cr 3; now 3.01  - continue Bumex drip     - renal sonogram w/ parenchymal changes, no hydro, no stones, left simple renal cyst.  - Avoid nephrotoxins including NSAIDs, IV contrast (if possible), [employer] products  - maintain MAP >65  - monitor I/O's accurately  - made 3.3L urine in the past 24 hrs    - vanco trough 30.2 yesterday  - repeat vanco trough  - redose vanco if trough <15    Had a long conversation w/ pt's daughter, Ms. Elsa Negrete [phone], regarding renal function and need for cardiac cath. She is agreeable w/ whatever plan the cardiology/medical team decides on as she is not sure what the best option is for her mother. At this time, I explained the possibility that pt may need dialysis even if she does not have cardiac cath given severely decreased renal and cardiac function. I explained that she is at a higher risk of needing dialysis If cardiac cath is performed (especially if performed emergently w/ current MERVIN). Ms. Negrete is concerned that her mother is weak and cannot withstand the catheterization and dialysis but re-iterated to do whatever the cardiology/medical team thinks is best. I informed her that currently, there is no emergent need for cardiac cath and that the cardiology team is awaiting renal improvement prior to cardiac cath (per resident, as of this morning) and that I agree w/ this decision, however, should cardiac catheterization become urgent/emergent, that I would agree w/ catheterization and close follow up of renal function post-cath.    - according to Sandip et. al. NGHIA risk calculator, pt has a 57.3% risk of NGHIA post-cath and a 12.6% risk of needing dialysis post-cath (if 100 mL contrast is used and given her current parameters)    Metabolic acidosis  - 2/2 lactic acidosis 2/2 hypoperfusion/hypotension/dec cardiac output  - lactic acidosis resolved w/ improved perfusion on IABP  - monitor    Hypotension  - likely from dec cardiac output and hypoperfusion  - hold anti-hypertensives (hydralazine, imdur, spironolactone)  - beta blocker per cardio recs  - continue bumex drip but monitor BP closely  - improved w/ IABP    Proteinuria/Hematuria  - likely from underlying DM  - Hep C neg in 2017  - check spot protein/Cr, Hep B (sAg, cAb, sAb, eAg), HIV, syphilis, SPEP, UPEP, serum immunofixation, ANCA, C3 and C4 complement levels, DEAN, rheumatoid factor, cryoglobulin

## 2019-10-10 NOTE — CHART NOTE - NSCHARTNOTEFT_GEN_A_CORE
Upon Nutritional Assessment by the Registered Dietitian your patient was determined to meet criteria / has evidence of the following diagnosis/diagnoses:          [ ]  Mild Protein Calorie Malnutrition        [x]  Moderate Protein Calorie Malnutrition        [ ] Severe Protein Calorie Malnutrition        [ ] Unspecified Protein Calorie Malnutrition        [ ] Underweight / BMI <19        [ ] Morbid Obesity / BMI > 40      Findings as based on:  [x] Comprehensive nutrition assessment PO intake </=75% x >/= 7 days  [x] Nutrition Focused Physical Exam Mild-moderate body fat/muscle mass depletion   [ ] Other:       Nutrition Plan/Recommendations:    1) Change diet to consistent CHO (with evening snack) + low sodium + Halal; d/c DASH/TLC. Defer fluids to team.   2) RD to add diet health shakes TID with meals.   3) Recommend renal multivitamin.   4) Encourage and monitor PO intake. Encourage use of daily menus to promote optimal PO intake potential.   5) Obtain further subjective wt/diet history from pt/family as able.   6) Diet education deferred at this time. RD to remain available as per request of pt/medical team.    PROVIDER Section:     By signing this assessment you are acknowledging and agree with the diagnosis/diagnoses assigned by the Registered Dietitian    Comments:

## 2019-10-10 NOTE — CONSULT NOTE ADULT - PROBLEM SELECTOR PROBLEM 1
Type 2 diabetes mellitus without complication, with long-term current use of insulin
Acute on chronic systolic heart failure

## 2019-10-10 NOTE — CONSULT NOTE ADULT - PROBLEM SELECTOR RECOMMENDATION 2
Resolved. FU primary team/heart failure team recommendations.
- baseline Cr 1.6-1.8  - admitted w/ Cr ~3  - monitor w/ diuresis  - nephrology following

## 2019-10-10 NOTE — CONSULT NOTE ADULT - PROBLEM SELECTOR RECOMMENDATION 9
Will increase Lantus to 30u at bedtime, increase Humalog to 8u before meals, and continue coverage scale. Will continue monitoring FS, log, and FU.  Patient counseled for compliance with consistent low carb diet.
- presented with pulmonary edema requiring bipap  - s/p diuresis w/ bumex gtt, now off bipap  - CI stable on IABP - start weaning today  - give bumex 4mg IV x1 - keep net even (CVP goal 6-8) while weaning off IABP  - if mixed venous drops, start inotropes

## 2019-10-10 NOTE — DIETITIAN INITIAL EVALUATION ADULT. - 30 CAL TO
Continue: Maxitrol (neomycin-polymyxin-dexameth): ointment: 3.5-10,000-0.1 mg-unit/g-% a small amount twice a day on eyelid [id number]

## 2019-10-10 NOTE — PROGRESS NOTE ADULT - SUBJECTIVE AND OBJECTIVE BOX
CHIEF COMPLAINT: patient was seen earlier today at 9 AM she is feeling better less anxious    Patient is a 73y old  Female who presents with a chief complaint of Acute decompensated heart failure, SOB (09 Oct 2019 22:05)      INTERVAL HISTORY/OVERNIGHT EVENTS: No acute events overnight. Did not require Bumex as UOP 50 cc/hr. Overnight pt continued to complain of pain, received oxycodone 5 mg at 8pm with good response. Pt seen and examined this morning. Does not have any complaints. No chest pain, shortness of breath.   SUBJECTIVE:     REVIEW OF SYSTEMS:    CONSTITUTIONAL: ( x )  weakness,  (  ) fevers or chills  EYES/ENT: (  )visual changes;     NECK: (  ) pain or stiffness  RESPIRATORY:   (  )cough, wheezing, hemoptysis;  (  ) shortness of breath  CARDIOVASCULAR:  (  )chest pain or palpitations  GASTROINTESTINAL:   (  )abdominal or epigastric pain.  (  ) nausea, vomiting, or hematemesis;   (   ) diarrhea or constipation.   GENITOURINARY:   (    ) dysuria, frequency or hematuria  NEUROLOGICAL:  (   ) numbness or weakness   All other review of systems is negative unless indicated above    Vital Signs Last 24 Hrs  T(C): 36.6 (10 Oct 2019 19:00), Max: 36.6 (10 Oct 2019 05:00)  T(F): 97.9 (10 Oct 2019 19:00), Max: 97.9 (10 Oct 2019 05:00)  HR: 64 (10 Oct 2019 22:00) (58 - 152)  BP: --  BP(mean): --  RR: 20 (10 Oct 2019 22:00) (14 - 33)  SpO2: 99% (10 Oct 2019 22:00) (94% - 100%)    I&O's Summary    09 Oct 2019 07:01  -  10 Oct 2019 07:00  --------------------------------------------------------  IN: 1107 mL / OUT: 3295 mL / NET: -2188 mL    10 Oct 2019 07:01  -  10 Oct 2019 23:21  --------------------------------------------------------  IN: 612 mL / OUT: 1560 mL / NET: -948 mL        CAPILLARY BLOOD GLUCOSE      POCT Blood Glucose.: 209 mg/dL (10 Oct 2019 22:01)  POCT Blood Glucose.: 237 mg/dL (10 Oct 2019 16:41)  POCT Blood Glucose.: 258 mg/dL (10 Oct 2019 12:19)  POCT Blood Glucose.: 195 mg/dL (10 Oct 2019 07:16)      PHYSICAL EXAM:  General: ill-appearing elderly woman lying in bed on room air, appears comfortable  HEENT: PERRLA, EOMI, moist mucous membranes  Respiratory: clear bilaterally   CV: RRR, S1S2, no murmurs, rubs or gallops  Abdominal: Soft, nontender, nondistended  Extremities: no edema, + peripheral pulses  Skin: right femoral balloon pump in place, appears clean/dry/intact, good pulses     on portable +Oscar        MEDICATIONS:  MEDICATIONS  (STANDING):  aspirin enteric coated 81 milliGRAM(s) Oral daily  cefepime   IVPB 1000 milliGRAM(s) IV Intermittent every 24 hours  chlorhexidine 2% Cloths 1 Application(s) Topical <User Schedule>  clopidogrel Tablet 75 milliGRAM(s) Oral daily  dextrose 5%. 1000 milliLiter(s) (50 mL/Hr) IV Continuous <Continuous>  dextrose 50% Injectable 12.5 Gram(s) IV Push once  dextrose 50% Injectable 25 Gram(s) IV Push once  dextrose 50% Injectable 25 Gram(s) IV Push once  docusate sodium 100 milliGRAM(s) Oral two times a day  heparin  Infusion.  Unit(s)/Hr (6.5 mL/Hr) IV Continuous <Continuous>  insulin glargine Injectable (LANTUS) 30 Unit(s) SubCutaneous at bedtime  insulin lispro (HumaLOG) corrective regimen sliding scale   SubCutaneous at bedtime  insulin lispro (HumaLOG) corrective regimen sliding scale   SubCutaneous three times a day before meals  insulin lispro Injectable (HumaLOG) 8 Unit(s) SubCutaneous three times a day before meals  levothyroxine 50 MICROGram(s) Oral daily  melatonin 5 milliGRAM(s) Oral at bedtime  metoprolol tartrate 12.5 milliGRAM(s) Oral two times a day  pantoprazole    Tablet 40 milliGRAM(s) Oral before breakfast  polyethylene glycol 3350 17 Gram(s) Oral daily  potassium chloride  20 mEq/100 mL IVPB 20 milliEquivalent(s) IV Intermittent every 2 hours  senna 2 Tablet(s) Oral at bedtime      LABS: All Labs Reviewed:                        7.9    11.28 )-----------( 235      ( 10 Oct 2019 18:27 )             25.2     10-10    137  |  99  |  110<H>  ----------------------------<  217<H>  3.4<L>   |  20<L>  |  3.21<H>    Ca    9.0      10 Oct 2019 18:27  Phos  4.6     10-10  Mg     2.5     10-10    TPro  6.9  /  Alb  3.2<L>  /  TBili  0.6  /  DBili  x   /  AST  1457<H>  /  ALT  1803<H>  /  AlkPhos  141<H>  10-10    PT/INR - ( 10 Oct 2019 06:08 )   PT: 16.3 sec;   INR: 1.41 ratio         PTT - ( 10 Oct 2019 18:27 )  PTT:87.8 sec      Blood Culture: 10-08 @ 14:47  Organism --  Gram Stain Blood -- Gram Stain --  Specimen Source .Blood  Culture-Blood --    10-08 @ 05:03  Organism --  Gram Stain Blood -- Gram Stain --  Specimen Source .Urine  Culture-Blood --    10-08 @ 01:44  Organism --  Gram Stain Blood -- Gram Stain --  Specimen Source .Blood  Culture-Blood --      Urine Culture      RADIOLOGY/EKG:    ASSESSMENT AND PLAN:  73 YOF with PMHx of HTN, HLD, T2DM, GERD, CAD s/p CABG (LIMA to LAD, SVG to OM, SVG to PDA 2014 at [facility]), severe mitral regurgitation s/p mitral clip, severe pulmonary HTN, CKD IV, hypothyroidism presenting with SOB likely 2/2 ADHF. s/p IABP 10/8. On NSTEMI treatment.     #Neuro  -NAIs    #CV  -c/w asa/plavix. heparin gtt 10/8-  -hydralazine 25 mg TID discontinued   - off Bumex drip  -monitor I&Os, daily weights.  -lactate downtrending 12.9>>2.2. Creatinine stable since admission, 2.90.   -[facility] after improving renal function  #Pulm  -currently sating well on 2L NC, lactate downtrending     #GI  -c/w protonix 40mg qD  -transaminitis in the setting of congestive hepatopathy, downtrending  -trend CMP daily--holding statin at this time     #Heme/onc  -H/H in 8 range, down from admission however stable   -active T&S    #Endo  - was on levemir 82 U qAM and 90 U qPM. humalog 64U premeals TID  -s/p insulin gtt, now Lantus 30 U QHS, Humalog 8 units TID, moderate dose sliding scale    -levothyroxine 50mcg  -trend finger sticks     #ID  -Leukocytosis. UA/UCx negative. BCxs negative x 2. CXR w/ infiltrates likely 2/2 edema. Procal elev. Ustrep/legionella pending. s/p vanc/zosyn. now on cefepime 1 g Q24h for renal dosing.     #Renal  -SCr  3.12, baseline 1.9-2.0  -s/p sodium bicarb 650mg TID- d/c'd  -most likely 2/2 cardiorenal  -trend BMP daily, avoid nephrotoxic agents    #DVT ppx  -heparin subQ  DVT PPX:    ADVANCED DIRECTIVE:    DISPOSITION:

## 2019-10-10 NOTE — DIETITIAN INITIAL EVALUATION ADULT. - OTHER INFO
Pt is a "72 yo F with HTN, HLD, DM2, CAD s/p CABG (LIMA to LAD, SVG to OM, SVG to PDA), severe mitral regurgitation s/p mitral clip, severe pulmonary HTN, CKD IV, hypothyroidism presented with two days of SOB". Pt noted with pulmonary edema, s/p diuresis. Taken to cath lab, placed on IABP. Nephrology following 2/2 MERVIN on CKD stage 4, metabolic acidosis, hypotension, and proteinuria/hematuria.     INFORMATION PTA: Pt declined use of  phone during RD visit. Able to communicate in basic English. No family present at bedside. Limited information obtained.     ·Diet PTA: Per discussion with pt, reports poor appetite and confirms intake of </= 50% of meals at baseline (pt unable to specify time period). Expressed "I don't like to eat"; denies enjoyment of chicken/fish, beef/pork. Only preferences voiced were for rice and apple juice. Pt reports that daughter typically procures food and prepares meals, however unable to provide specifics on what she usually consumes. Denies food allergies, denies intolerance to chewing/swallowing. A1c (9/5): 8.7%, indicative of poor glycemic control. Per H&P, pt with hx of taking insulin detemir, Humalog; and Nephro-Chris.     ·Weight History PTA: Pt unaware of UBW. Per review of prior RD note 7/11/19: "Per [facility] records: 121.4 lbs (10/31/18, standing) 124.3 lbs (5/11/19, bed)"; 117.5 lbs (7/9/19). 112.6 (9/2/19). Current admission dosing wt (10/7/19): 116.8 lbs. Wt fluctuations noted.     INFORMATION THIS ADMISSION    ·Last BM: No BM's recorded in-house at this time. On bowel regimen as ordered.     ·Other Subjective Information: Per RN, pt consumed ~10% of breakfast and lunch meal today (10/10); 0% of meals (10/9).     ·Therapeutic Diet Education Provided: Deferred at this time; pt not appropriate to receive therapeutic diet education 2/2 clinical condition. Staff to encourage PO intake at this time, consider diet liberalization and encourage use of daily menus to promote optimal PO intake potential. Pt amenable to receive health shakes with meals.

## 2019-10-10 NOTE — CONSULT NOTE ADULT - SUBJECTIVE AND OBJECTIVE BOX
Date of Admission: 10/8/19    Patient is a 73y old  Female who presents with a chief complaint of Acute decompensated heart failure, SOB.    HISTORY OF PRESENT ILLNESS:   72 yo F with HTN, HLD, DM2, CAD s/p CABG (LIMA to LAD, SVG to OM, SVG to PDA), severe mitral regurgitation s/p mitral clip, severe pulmonary HTN, CKD IV, hypothyroidism presented with two days of SOB. She reports that her daughter gives her all her medications and that she has been compliant with her meds. No recent changes to her diet, no recent illness. She was in pulmonary edema on bipap, now s/p diuresis. She was taken to the cath lab, placed on an IABP. No LHC done due to MERVIN.      Allergies    azithromycin (Hives; Pruritus)    Intolerances    	    MEDICATIONS:  aspirin enteric coated 81 milliGRAM(s) Oral daily  clopidogrel Tablet 75 milliGRAM(s) Oral daily  heparin  Infusion.  Unit(s)/Hr IV Continuous <Continuous>  heparin  Injectable 3200 Unit(s) IV Push every 6 hours PRN  cefepime   IVPB 1000 milliGRAM(s) IV Intermittent every 24 hours  ALPRAZolam 0.25 milliGRAM(s) Oral three times a day PRN  docusate sodium 100 milliGRAM(s) Oral two times a day  pantoprazole    Tablet 40 milliGRAM(s) Oral before breakfast  polyethylene glycol 3350 17 Gram(s) Oral daily  senna 2 Tablet(s) Oral at bedtime  dextrose 40% Gel 15 Gram(s) Oral once PRN  dextrose 50% Injectable 12.5 Gram(s) IV Push once  dextrose 50% Injectable 25 Gram(s) IV Push once  dextrose 50% Injectable 25 Gram(s) IV Push once  glucagon  Injectable 1 milliGRAM(s) IntraMuscular once PRN  insulin glargine Injectable (LANTUS) 25 Unit(s) SubCutaneous at bedtime  insulin lispro (HumaLOG) corrective regimen sliding scale   SubCutaneous at bedtime  insulin lispro (HumaLOG) corrective regimen sliding scale   SubCutaneous three times a day before meals  insulin lispro Injectable (HumaLOG) 6 Unit(s) SubCutaneous three times a day before meals  levothyroxine 50 MICROGram(s) Oral daily    chlorhexidine 2% Cloths 1 Application(s) Topical <User Schedule>  dextrose 5%. 1000 milliLiter(s) IV Continuous <Continuous>      PAST MEDICAL & SURGICAL HISTORY:  GERD (gastroesophageal reflux disease)  CAD (coronary artery disease): s/p CABG  Hypothyroidism  Hyperlipidemia  Diabetes mellitus, type 2  S/P CABG (coronary artery bypass graft)      FAMILY HISTORY:  Family history of hypertension (Sibling): sister  Family history of diabetes mellitus (Sibling): brothers/sisters      SOCIAL HISTORY:          REVIEW OF SYSTEMS:    CONSTITUTIONAL: No weakness, fevers or chills  EYES/ENT: No visual changes;  No dysphagia  RESPIRATORY: No cough, wheezing, hemoptysis; No shortness of breath  CARDIOVASCULAR: No chest pain or palpitations; No lower extremity edema  GASTROINTESTINAL: No abdominal or epigastric pain. No nausea, vomiting, or hematemesis  GENITOURINARY: No dysuria, frequency or hematuria  NEUROLOGICAL: No numbness or weakness  SKIN: No itching, burning, rashes, or lesions  HEME: No bleeding or bruising  MSK: No joint pains or muscle pains  All other review of systems is negative unless indicated above.    PHYSICAL EXAM:  T(C): 36.6 (10-10-19 @ 05:00), Max: 36.6 (10-09-19 @ 19:00)  HR: 68 (10-10-19 @ 13:00) (58 - 74)  BP: --  RR: 21 (10-10-19 @ 13:00) (16 - 36)  SpO2: 97% (10-10-19 @ 13:00) (94% - 100%)  Wt(kg): --  I&O's Summary    09 Oct 2019 07:01  -  10 Oct 2019 07:00  --------------------------------------------------------  IN: 1107 mL / OUT: 3295 mL / NET: -2188 mL    10 Oct 2019 07:01  -  10 Oct 2019 13:19  --------------------------------------------------------  IN: 224 mL / OUT: 805 mL / NET: -581 mL        Appearance: Normal	  HEENT:   Normal oral mucosa  Cardiovascular: Normal S1 S2, No JVD, No murmurs, No edema  Respiratory: Lungs clear to auscultation	  Psychiatry: A & O x 3, Mood & affect appropriate  Gastrointestinal:  Soft, Non-tender, + BS	  Skin: No rashes, No ecchymoses, No cyanosis	  Neurologic: Non-focal  Extremities: Normal range of motion, No clubbing, cyanosis or edema        LABS:	 	    CBC Full  -  ( 10 Oct 2019 06:08 )  WBC Count : 11.89 K/uL  Hemoglobin : 7.9 g/dL  Hematocrit : 25.3 %  Platelet Count - Automated : 250 K/uL  Mean Cell Volume : 84.6 fl  Mean Cell Hemoglobin : 26.4 pg  Mean Cell Hemoglobin Concentration : 31.2 gm/dL  Auto Neutrophil # : 8.51 K/uL  Auto Lymphocyte # : 1.71 K/uL  Auto Monocyte # : 0.99 K/uL  Auto Eosinophil # : 0.46 K/uL  Auto Basophil # : 0.03 K/uL  Auto Neutrophil % : 71.5 %  Auto Lymphocyte % : 14.4 %  Auto Monocyte % : 8.3 %  Auto Eosinophil % : 3.9 %  Auto Basophil % : 0.3 %    10-10    139  |  102  |  109<H>  ----------------------------<  194<H>  4.8   |  21<L>  |  3.01<H>  10-09    137  |  96  |  108<H>  ----------------------------<  304<H>  3.3<L>   |  22  |  3.05<H>    Ca    9.1      10 Oct 2019 06:08  Ca    9.0      09 Oct 2019 23:12  Phos  4.3     10-10  Phos  4.7     10-09  Mg     2.6     10-10  Mg     2.5     10-09    TPro  6.9  /  Alb  3.2<L>  /  TBili  0.6  /  DBili  x   /  AST  1457<H>  /  ALT  1803<H>  /  AlkPhos  141<H>  10-10  TPro  6.7  /  Alb  3.2<L>  /  TBili  0.6  /  DBili  x   /  AST  1676<H>  /  ALT  1853<H>  /  AlkPhos  138<H>  10-09      proBNP: 25391    CARDIAC MARKERS:  hs trop 1840    ECG:  	NSR, RBBB, TWI 42-46    RADIOLOGY: CXR w/ clear lungs now    PREVIOUS DIAGNOSTIC TESTING:    Echo 10/8/19  Mitral Valve: s/p MitraClip.  Mean mitral gradient 3.8 mmHg at 76/min.  Minimal mitral regurgitation.  Aortic Valve/Aorta: Mildly calcified aortic valve with  normal opening.  Minimal aortic regurgitation.  Normal aortic root size.  Left Atrium: Moderate left atrial enlargement.  Left Ventricle: Endocardial visualization enhanced with  intravenous injection of Ultrasonic Enhancing Agent  (Definity).  Mild left ventricular enlargement.  Severely reduced left ventricular systolic function.  Inferolateralwall akinesis. Diffuse left ventricular  hypokionesis.  Right Heart: Right atrium not well visualized. Moderate  right ventricular enlargement. Right ventricular systolic  function is at least moderately redcued. The dP/dt of the  TR-jet is approximately 309 mmHg. Normal tricuspid valve.  Mild tricuspid regurgitation. Normal pulmonic valve. Mild  pulmonic regurgitation.  Pericardium/Pleura: Normal pericardium with no pericardial  effusion.  Hemodynamic: Estimated right atrial pressure is moderately  increased.  Moderate pulmonary hypertension. Estimated PASP 60 mmHg.    RHC 10/8/19  RA 21, RV 66/20, PA 64/29 (42), wedge 26, PA sat 48%, CO 3.82, CI 2.35, SVR 2400 with /66 Date of Admission: 10/8/19    Patient is a 73y old  Female who presents with a chief complaint of Acute decompensated heart failure, SOB.    HISTORY OF PRESENT ILLNESS:   72 yo F with HTN, HLD, DM2, CAD s/p CABG (LIMA to LAD, SVG to OM, SVG to PDA), severe mitral regurgitation s/p mitral clip, severe pulmonary HTN, CKD IV, hypothyroidism presented with two days of SOB. She reports that her daughter gives her all her medications and that she has been compliant with her meds. No recent changes to her diet, no recent illness. On arrival, she was in pulmonary edema on bipap, now s/p diuresis. She was taken to the cath lab, placed on an IABP. No LHC done due to MERVIN. She has been diuresed significantly and is now off bipap. Today she complains of 'not feeling well' but denies SOB or CP.      Allergies    azithromycin (Hives; Pruritus)    Intolerances    	    MEDICATIONS:  aspirin enteric coated 81 milliGRAM(s) Oral daily  clopidogrel Tablet 75 milliGRAM(s) Oral daily  heparin  Infusion.  Unit(s)/Hr IV Continuous <Continuous>  heparin  Injectable 3200 Unit(s) IV Push every 6 hours PRN  cefepime   IVPB 1000 milliGRAM(s) IV Intermittent every 24 hours  ALPRAZolam 0.25 milliGRAM(s) Oral three times a day PRN  docusate sodium 100 milliGRAM(s) Oral two times a day  pantoprazole    Tablet 40 milliGRAM(s) Oral before breakfast  polyethylene glycol 3350 17 Gram(s) Oral daily  senna 2 Tablet(s) Oral at bedtime  dextrose 40% Gel 15 Gram(s) Oral once PRN  dextrose 50% Injectable 12.5 Gram(s) IV Push once  dextrose 50% Injectable 25 Gram(s) IV Push once  dextrose 50% Injectable 25 Gram(s) IV Push once  glucagon  Injectable 1 milliGRAM(s) IntraMuscular once PRN  insulin glargine Injectable (LANTUS) 25 Unit(s) SubCutaneous at bedtime  insulin lispro (HumaLOG) corrective regimen sliding scale   SubCutaneous at bedtime  insulin lispro (HumaLOG) corrective regimen sliding scale   SubCutaneous three times a day before meals  insulin lispro Injectable (HumaLOG) 6 Unit(s) SubCutaneous three times a day before meals  levothyroxine 50 MICROGram(s) Oral daily    chlorhexidine 2% Cloths 1 Application(s) Topical <User Schedule>  dextrose 5%. 1000 milliLiter(s) IV Continuous <Continuous>      PAST MEDICAL & SURGICAL HISTORY:  GERD (gastroesophageal reflux disease)  CAD (coronary artery disease): s/p CABG  Hypothyroidism  Hyperlipidemia  Diabetes mellitus, type 2  S/P CABG (coronary artery bypass graft)      FAMILY HISTORY:  Family history of hypertension (Sibling): sister  Family history of diabetes mellitus (Sibling): brothers/sisters      SOCIAL HISTORY:          REVIEW OF SYSTEMS:    CONSTITUTIONAL: No weakness, fevers or chills  EYES/ENT: No visual changes;  No dysphagia  RESPIRATORY: No cough, wheezing, hemoptysis; No shortness of breath  CARDIOVASCULAR: No chest pain or palpitations; No lower extremity edema  GASTROINTESTINAL: No abdominal or epigastric pain. No nausea, vomiting, or hematemesis  GENITOURINARY: No dysuria, frequency or hematuria  NEUROLOGICAL: No numbness or weakness  SKIN: No itching, burning, rashes, or lesions  HEME: No bleeding or bruising  MSK: No joint pains or muscle pains  All other review of systems is negative unless indicated above.    PHYSICAL EXAM:  T(C): 36.6 (10-10-19 @ 05:00), Max: 36.6 (10-09-19 @ 19:00)  HR: 68 (10-10-19 @ 13:00) (58 - 74)  BP: --  RR: 21 (10-10-19 @ 13:00) (16 - 36)  SpO2: 97% (10-10-19 @ 13:00) (94% - 100%)  Wt(kg): --  I&O's Summary    09 Oct 2019 07:01  -  10 Oct 2019 07:00  --------------------------------------------------------  IN: 1107 mL / OUT: 3295 mL / NET: -2188 mL    10 Oct 2019 07:01  -  10 Oct 2019 13:19  --------------------------------------------------------  IN: 224 mL / OUT: 805 mL / NET: -581 mL        Appearance: Normal	  HEENT:   Normal oral mucosa  Cardiovascular: Normal S1 S2, No JVD, No murmurs, No edema  Respiratory: Lungs clear to auscultation	  Psychiatry: A & O x 3, Mood & affect appropriate  Gastrointestinal:  Soft, Non-tender, + BS	  Skin: No rashes, No ecchymoses, No cyanosis	  Neurologic: Non-focal  Extremities: Normal range of motion, No clubbing, cyanosis or edema        LABS:	 	    CBC Full  -  ( 10 Oct 2019 06:08 )  WBC Count : 11.89 K/uL  Hemoglobin : 7.9 g/dL  Hematocrit : 25.3 %  Platelet Count - Automated : 250 K/uL  Mean Cell Volume : 84.6 fl  Mean Cell Hemoglobin : 26.4 pg  Mean Cell Hemoglobin Concentration : 31.2 gm/dL  Auto Neutrophil # : 8.51 K/uL  Auto Lymphocyte # : 1.71 K/uL  Auto Monocyte # : 0.99 K/uL  Auto Eosinophil # : 0.46 K/uL  Auto Basophil # : 0.03 K/uL  Auto Neutrophil % : 71.5 %  Auto Lymphocyte % : 14.4 %  Auto Monocyte % : 8.3 %  Auto Eosinophil % : 3.9 %  Auto Basophil % : 0.3 %    10-10    139  |  102  |  109<H>  ----------------------------<  194<H>  4.8   |  21<L>  |  3.01<H>  10-09    137  |  96  |  108<H>  ----------------------------<  304<H>  3.3<L>   |  22  |  3.05<H>    Ca    9.1      10 Oct 2019 06:08  Ca    9.0      09 Oct 2019 23:12  Phos  4.3     10-10  Phos  4.7     10-09  Mg     2.6     10-10  Mg     2.5     10-09    TPro  6.9  /  Alb  3.2<L>  /  TBili  0.6  /  DBili  x   /  AST  1457<H>  /  ALT  1803<H>  /  AlkPhos  141<H>  10-10  TPro  6.7  /  Alb  3.2<L>  /  TBili  0.6  /  DBili  x   /  AST  1676<H>  /  ALT  1853<H>  /  AlkPhos  138<H>  10-09      proBNP: 55172    CARDIAC MARKERS:  hs trop 1840    ECG:  	NSR, RBBB, TWI 42-46    RADIOLOGY: CXR w/ clear lungs now    PREVIOUS DIAGNOSTIC TESTING:    Echo 10/8/19  Mitral Valve: s/p MitraClip.  Mean mitral gradient 3.8 mmHg at 76/min.  Minimal mitral regurgitation.  Aortic Valve/Aorta: Mildly calcified aortic valve with  normal opening.  Minimal aortic regurgitation.  Normal aortic root size.  Left Atrium: Moderate left atrial enlargement.  Left Ventricle: Endocardial visualization enhanced with  intravenous injection of Ultrasonic Enhancing Agent  (Definity).  Mild left ventricular enlargement.  Severely reduced left ventricular systolic function.  Inferolateralwall akinesis. Diffuse left ventricular  hypokionesis.  Right Heart: Right atrium not well visualized. Moderate  right ventricular enlargement. Right ventricular systolic  function is at least moderately redcued. The dP/dt of the  TR-jet is approximately 309 mmHg. Normal tricuspid valve.  Mild tricuspid regurgitation. Normal pulmonic valve. Mild  pulmonic regurgitation.  Pericardium/Pleura: Normal pericardium with no pericardial  effusion.  Hemodynamic: Estimated right atrial pressure is moderately  increased.  Moderate pulmonary hypertension. Estimated PASP 60 mmHg.    RHC 10/8/19  RA 21, RV 66/20, PA 64/29 (42), wedge 26, PA sat 48%, CO 3.82, CI 2.35, SVR 2400 with /66

## 2019-10-10 NOTE — CONSULT NOTE ADULT - ASSESSMENT
72 yo F with HTN, HLD, DM2, CAD s/p CABG (LIMA to LAD, SVG to OM, SVG to PDA), severe mitral regurgitation s/p mitral clip, severe pulmonary HTN, CKD IV, hypothyroidism presented with two days of SOB. She reports that her daughter gives her all her medications and that she has been compliant with her meds. No recent changes to her diet, no recent illness. She was in pulmonary edema on bipap, now s/p diuresis. She was taken to the cath lab, placed on an IABP due to elevated filling pressures and low CI. No LHC done due to MERVIN.      1. Cardiogenic shock  - on IABP, CI stable    2. NSTEMI    3. MERVIN on CKD    4. MR s/p mitraclip 72 yo F with HTN, HLD, DM2, CAD s/p CABG (LIMA to LAD, SVG to OM, SVG to PDA), severe mitral regurgitation s/p mitral clip, severe pulmonary HTN, CKD IV, hypothyroidism presented with two days of SOB, admitted for NSTEMI and ADHF, s/p IABP.      HD 10/10/19: CVP 5-6, PA 46/14 (24), mixed 55%, CO 4.28. CI 2.9, SVR 1000

## 2019-10-10 NOTE — CONSULT NOTE ADULT - SUBJECTIVE AND OBJECTIVE BOX
HPI:  73 YOF with PMHx of HTN, HLD, T2DM, GERD, CAD s/p CABG (LIMA to LAD, SVG to OM, SVG to PDA 2014 at [facility]), severe mitral regurgitation s/p mitral clip, severe pulmonary HTN, CKD IV, hypothyroidism presenting with 2 day history of SOB. Pt states she has been feeling increasingly short of breath for the past couple of days more pronounced with exertion a/w chest heaviness. Also reports a cough. Not able to discern whether dry or productive. She reports that her daughter gives her all her medications and that she has been compliant with her meds. No recent changes to her diet. Denies any recent illness including a cold. She sleeps with 2 pillows. ROS otherwise negative. In the ED was given asa 162 mg, zosyn x1, vanc 1 g.     VS:  98 F, BP 86/54, HR 54, RR 48, on Bipap 10/5--> 100% (08 Oct 2019 03:04)    Patient has history of diabetes, A1C 7.5%, on insulin at home (D/C plan from recent hospital admission: Lantus BID: 82u AM, 90u PM; Humalog 64u before meals; Humalog correction coverage scale), no recent hypoglycemic episodes, no polyuria polydipsia. Patient follows up with PCP for diabetes management.    PAST MEDICAL & SURGICAL HISTORY:  GERD (gastroesophageal reflux disease)  CAD (coronary artery disease): s/p CABG  Hypothyroidism  Hyperlipidemia  Diabetes mellitus, type 2  S/P CABG (coronary artery bypass graft)      FAMILY HISTORY:  Family history of hypertension (Sibling): sister  Family history of diabetes mellitus (Sibling): brothers/sisters      Social History:    Outpatient Medications:    MEDICATIONS  (STANDING):  aspirin enteric coated 81 milliGRAM(s) Oral daily  cefepime   IVPB 1000 milliGRAM(s) IV Intermittent every 24 hours  chlorhexidine 2% Cloths 1 Application(s) Topical <User Schedule>  clopidogrel Tablet 75 milliGRAM(s) Oral daily  dextrose 5%. 1000 milliLiter(s) (50 mL/Hr) IV Continuous <Continuous>  dextrose 50% Injectable 12.5 Gram(s) IV Push once  dextrose 50% Injectable 25 Gram(s) IV Push once  dextrose 50% Injectable 25 Gram(s) IV Push once  docusate sodium 100 milliGRAM(s) Oral two times a day  heparin  Infusion.  Unit(s)/Hr (6.5 mL/Hr) IV Continuous <Continuous>  insulin glargine Injectable (LANTUS) 30 Unit(s) SubCutaneous at bedtime  insulin lispro (HumaLOG) corrective regimen sliding scale   SubCutaneous at bedtime  insulin lispro (HumaLOG) corrective regimen sliding scale   SubCutaneous three times a day before meals  insulin lispro Injectable (HumaLOG) 8 Unit(s) SubCutaneous three times a day before meals  levothyroxine 50 MICROGram(s) Oral daily  pantoprazole    Tablet 40 milliGRAM(s) Oral before breakfast  polyethylene glycol 3350 17 Gram(s) Oral daily  senna 2 Tablet(s) Oral at bedtime    MEDICATIONS  (PRN):  ALPRAZolam 0.25 milliGRAM(s) Oral three times a day PRN Anxiety  dextrose 40% Gel 15 Gram(s) Oral once PRN Blood Glucose LESS THAN 70 milliGRAM(s)/deciliter  glucagon  Injectable 1 milliGRAM(s) IntraMuscular once PRN Glucose LESS THAN 70 milligrams/deciliter  heparin  Injectable 3200 Unit(s) IV Push every 6 hours PRN For aPTT less than 40      Allergies    azithromycin (Hives; Pruritus)    Intolerances      Review of Systems:  Constitutional: No fever, no chills  Eyes: No blurry vision  Neuro: No tremors  HEENT: No pain, no neck swelling  Cardiovascular: No chest pain, no palpitations  Respiratory: Has SOB, no cough  GI: No nausea, vomiting, abdominal pain  : No dysuria  Skin: no rash  MSK: Has leg swelling.  Psych: no depression  Endocrine: no polyuria, polydipsia    ALL OTHER SYSTEMS REVIEWED AND NEGATIVE    UNABLE TO OBTAIN    PHYSICAL EXAM:  VITALS: T(C): 36.6 (10-10-19 @ 05:00)  T(F): 97.9 (10-10-19 @ 05:00), Max: 97.9 (10-09-19 @ 19:00)  HR: 72 (10-10-19 @ 15:35) (58 - 150)  BP: --  RR:  (16 - 36)  SpO2:  (94% - 100%)  Wt(kg): --  GENERAL: NAD, well-groomed, well-developed  EYES: No proptosis, no lid lag  HEENT:  Atraumatic, Normocephalic  THYROID: Normal size, no palpable nodules  RESPIRATORY: Clear to auscultation bilaterally; No rales, rhonchi, wheezing  CARDIOVASCULAR: Si S2, No murmurs;  GI: Soft, non distended, normal bowel sounds  SKIN: Dry, intact, No rashes or lesions  MUSCULOSKELETAL: Has BL lower extremity edema.  NEURO:  no tremor, sensation decreased in feet BL,    POCT Blood Glucose.: 258 mg/dL (10-10-19 @ 12:19)  POCT Blood Glucose.: 195 mg/dL (10-10-19 @ 07:16)  POCT Blood Glucose.: 306 mg/dL (10-09-19 @ 21:44)  POCT Blood Glucose.: 199 mg/dL (10-09-19 @ 16:54)  POCT Blood Glucose.: 198 mg/dL (10-09-19 @ 11:39)  POCT Blood Glucose.: 221 mg/dL (10-09-19 @ 08:14)  POCT Blood Glucose.: 278 mg/dL (10-08-19 @ 07:33)  POCT Blood Glucose.: 284 mg/dL (10-08-19 @ 06:21)  POCT Blood Glucose.: 296 mg/dL (10-08-19 @ 04:43)  POCT Blood Glucose.: 244 mg/dL (10-07-19 @ 23:49)  POCT Blood Glucose.: 62 mg/dL (10-07-19 @ 22:39)                            7.9    11.89 )-----------( 250      ( 10 Oct 2019 06:08 )             25.3       10-10    139  |  102  |  109<H>  ----------------------------<  194<H>  4.8   |  21<L>  |  3.01<H>    EGFR if : 17<L>  EGFR if non : 15<L>    Ca    9.1      10-10  Mg     2.6     10-10  Phos  4.3     10-10    TPro  6.9  /  Alb  3.2<L>  /  TBili  0.6  /  DBili  x   /  AST  1457<H>  /  ALT  1803<H>  /  AlkPhos  141<H>  10-10      Thyroid Function Tests:  10-08 @ 14:47 TSH 1.01 FreeT4 -- T3 -- Anti TPO -- Anti Thyroglobulin Ab -- TSI --      Hemoglobin A1C, Whole Blood: 7.5 % <H> [4.0 - 5.6] (10-08-19 @ 14:30)  Hemoglobin A1C, Whole Blood: 8.7 % <H> [4.0 - 5.6] (09-05-19 @ 13:08)      10-08 Chol 83 LDL 46 HDL 14<L> Trig 117    Radiology:

## 2019-10-11 LAB
ALBUMIN SERPL ELPH-MCNC: 3.4 G/DL — SIGNIFICANT CHANGE UP (ref 3.3–5)
ALP SERPL-CCNC: 135 U/L — HIGH (ref 40–120)
ALT FLD-CCNC: 1395 U/L — HIGH (ref 10–45)
ANION GAP SERPL CALC-SCNC: 16 MMOL/L — SIGNIFICANT CHANGE UP (ref 5–17)
APTT BLD: 69 SEC — HIGH (ref 27.5–36.3)
APTT BLD: 81 SEC — HIGH (ref 27.5–36.3)
AST SERPL-CCNC: 682 U/L — HIGH (ref 10–40)
BASE EXCESS BLDMV CALC-SCNC: -0.1 MMOL/L — SIGNIFICANT CHANGE UP (ref -3–3)
BASE EXCESS BLDMV CALC-SCNC: 0.3 MMOL/L — SIGNIFICANT CHANGE UP (ref -3–3)
BILIRUB SERPL-MCNC: 0.6 MG/DL — SIGNIFICANT CHANGE UP (ref 0.2–1.2)
BUN SERPL-MCNC: 108 MG/DL — HIGH (ref 7–23)
CALCIUM SERPL-MCNC: 9.2 MG/DL — SIGNIFICANT CHANGE UP (ref 8.4–10.5)
CHLORIDE SERPL-SCNC: 103 MMOL/L — SIGNIFICANT CHANGE UP (ref 96–108)
CO2 BLDMV-SCNC: 24 MMOL/L — SIGNIFICANT CHANGE UP (ref 21–29)
CO2 BLDMV-SCNC: 25 MMOL/L — SIGNIFICANT CHANGE UP (ref 21–29)
CO2 SERPL-SCNC: 21 MMOL/L — LOW (ref 22–31)
CREAT SERPL-MCNC: 3.17 MG/DL — HIGH (ref 0.5–1.3)
GAS PNL BLDA: SIGNIFICANT CHANGE UP
GAS PNL BLDMV: SIGNIFICANT CHANGE UP
GAS PNL BLDMV: SIGNIFICANT CHANGE UP
GLUCOSE SERPL-MCNC: 151 MG/DL — HIGH (ref 70–99)
HCO3 BLDMV-SCNC: 23 MMOL/L — SIGNIFICANT CHANGE UP (ref 20–28)
HCO3 BLDMV-SCNC: 24 MMOL/L — SIGNIFICANT CHANGE UP (ref 20–28)
HCT VFR BLD CALC: 25.6 % — LOW (ref 34.5–45)
HGB BLD-MCNC: 7.8 G/DL — LOW (ref 11.5–15.5)
HOROWITZ INDEX BLDMV+IHG-RTO: 21 — SIGNIFICANT CHANGE UP
LACTATE SERPL-SCNC: 1.4 MMOL/L — SIGNIFICANT CHANGE UP (ref 0.7–2)
LACTATE SERPL-SCNC: 1.7 MMOL/L — SIGNIFICANT CHANGE UP (ref 0.7–2)
MAGNESIUM SERPL-MCNC: 2.6 MG/DL — SIGNIFICANT CHANGE UP (ref 1.6–2.6)
MCHC RBC-ENTMCNC: 25.8 PG — LOW (ref 27–34)
MCHC RBC-ENTMCNC: 30.5 GM/DL — LOW (ref 32–36)
MCV RBC AUTO: 84.8 FL — SIGNIFICANT CHANGE UP (ref 80–100)
NRBC # BLD: 0 /100 WBCS — SIGNIFICANT CHANGE UP (ref 0–0)
O2 CT VFR BLD CALC: 37 MMHG — SIGNIFICANT CHANGE UP (ref 30–65)
O2 CT VFR BLD CALC: 38 MMHG — SIGNIFICANT CHANGE UP (ref 30–65)
PCO2 BLDMV: 32 MMHG — SIGNIFICANT CHANGE UP (ref 30–65)
PCO2 BLDMV: 39 MMHG — SIGNIFICANT CHANGE UP (ref 30–65)
PH BLDMV: 7.41 — SIGNIFICANT CHANGE UP (ref 7.32–7.45)
PH BLDMV: 7.47 — HIGH (ref 7.32–7.45)
PHOSPHATE SERPL-MCNC: 4.6 MG/DL — HIGH (ref 2.5–4.5)
PLATELET # BLD AUTO: 226 K/UL — SIGNIFICANT CHANGE UP (ref 150–400)
POTASSIUM SERPL-MCNC: 4.4 MMOL/L — SIGNIFICANT CHANGE UP (ref 3.5–5.3)
POTASSIUM SERPL-SCNC: 4.4 MMOL/L — SIGNIFICANT CHANGE UP (ref 3.5–5.3)
PROT SERPL-MCNC: 7.3 G/DL — SIGNIFICANT CHANGE UP (ref 6–8.3)
RBC # BLD: 3.02 M/UL — LOW (ref 3.8–5.2)
RBC # FLD: 18.5 % — HIGH (ref 10.3–14.5)
SAO2 % BLDMV: 59 % — LOW (ref 60–90)
SAO2 % BLDMV: 68 % — SIGNIFICANT CHANGE UP (ref 60–90)
SODIUM SERPL-SCNC: 140 MMOL/L — SIGNIFICANT CHANGE UP (ref 135–145)
WBC # BLD: 11.74 K/UL — HIGH (ref 3.8–10.5)
WBC # FLD AUTO: 11.74 K/UL — HIGH (ref 3.8–10.5)

## 2019-10-11 PROCEDURE — 99291 CRITICAL CARE FIRST HOUR: CPT

## 2019-10-11 PROCEDURE — 71045 X-RAY EXAM CHEST 1 VIEW: CPT | Mod: 26

## 2019-10-11 PROCEDURE — 93010 ELECTROCARDIOGRAM REPORT: CPT | Mod: 76

## 2019-10-11 PROCEDURE — 93010 ELECTROCARDIOGRAM REPORT: CPT

## 2019-10-11 RX ORDER — ADENOSINE 3 MG/ML
6 INJECTION INTRAVENOUS ONCE
Refills: 0 | Status: COMPLETED | OUTPATIENT
Start: 2019-10-11 | End: 2019-10-11

## 2019-10-11 RX ORDER — METOPROLOL TARTRATE 50 MG
12.5 TABLET ORAL ONCE
Refills: 0 | Status: COMPLETED | OUTPATIENT
Start: 2019-10-11 | End: 2019-10-11

## 2019-10-11 RX ADMIN — CHLORHEXIDINE GLUCONATE 1 APPLICATION(S): 213 SOLUTION TOPICAL at 05:16

## 2019-10-11 RX ADMIN — Medication 4: at 21:34

## 2019-10-11 RX ADMIN — CEFEPIME 100 MILLIGRAM(S): 1 INJECTION, POWDER, FOR SOLUTION INTRAMUSCULAR; INTRAVENOUS at 10:16

## 2019-10-11 RX ADMIN — ADENOSINE 6 MILLIGRAM(S): 3 INJECTION INTRAVENOUS at 23:56

## 2019-10-11 RX ADMIN — HEPARIN SODIUM 250 UNIT(S)/HR: 5000 INJECTION INTRAVENOUS; SUBCUTANEOUS at 10:19

## 2019-10-11 RX ADMIN — INSULIN GLARGINE 30 UNIT(S): 100 INJECTION, SOLUTION SUBCUTANEOUS at 21:33

## 2019-10-11 RX ADMIN — SENNA PLUS 2 TABLET(S): 8.6 TABLET ORAL at 21:26

## 2019-10-11 RX ADMIN — Medication 8 UNIT(S): at 10:17

## 2019-10-11 RX ADMIN — Medication 0.25 MILLIGRAM(S): at 10:26

## 2019-10-11 RX ADMIN — PANTOPRAZOLE SODIUM 40 MILLIGRAM(S): 20 TABLET, DELAYED RELEASE ORAL at 05:16

## 2019-10-11 RX ADMIN — Medication 81 MILLIGRAM(S): at 10:20

## 2019-10-11 RX ADMIN — POLYETHYLENE GLYCOL 3350 17 GRAM(S): 17 POWDER, FOR SOLUTION ORAL at 17:45

## 2019-10-11 RX ADMIN — Medication 5 MILLIGRAM(S): at 22:37

## 2019-10-11 RX ADMIN — Medication 8 UNIT(S): at 17:44

## 2019-10-11 RX ADMIN — Medication 100 MILLIGRAM(S): at 17:46

## 2019-10-11 RX ADMIN — CLOPIDOGREL BISULFATE 75 MILLIGRAM(S): 75 TABLET, FILM COATED ORAL at 10:20

## 2019-10-11 RX ADMIN — Medication 100 MILLIGRAM(S): at 05:15

## 2019-10-11 RX ADMIN — Medication 4: at 17:45

## 2019-10-11 RX ADMIN — HEPARIN SODIUM 300 UNIT(S)/HR: 5000 INJECTION INTRAVENOUS; SUBCUTANEOUS at 03:03

## 2019-10-11 RX ADMIN — Medication 12.5 MILLIGRAM(S): at 05:15

## 2019-10-11 RX ADMIN — Medication 12.5 MILLIGRAM(S): at 21:26

## 2019-10-11 RX ADMIN — Medication 12.5 MILLIGRAM(S): at 17:45

## 2019-10-11 RX ADMIN — Medication 8: at 12:39

## 2019-10-11 RX ADMIN — Medication 8 UNIT(S): at 12:39

## 2019-10-11 RX ADMIN — Medication 50 MILLIEQUIVALENT(S): at 00:08

## 2019-10-11 RX ADMIN — Medication 50 MICROGRAM(S): at 05:15

## 2019-10-11 RX ADMIN — ADENOSINE 6 MILLIGRAM(S): 3 INJECTION INTRAVENOUS at 20:18

## 2019-10-11 RX ADMIN — Medication 4: at 10:16

## 2019-10-11 NOTE — CHART NOTE - NSCHARTNOTEFT_GEN_A_CORE
CCU Transfer Note    Transfer from: CCU    Transfer to: (  ) Medicine    (  ) Telemetry    (  ) RCU                               (  ) Palliative    (  ) Stroke Unit    (  ) MICU    (  ) __________________    Accepting physician:    Sign out given to:     HPI / CCU COURSE:  74 yo F with HTN, HLD, DM2 (A1c 7.5 10/19), CAD s/p CABG (LIMA to LAD, SVG to OM, SVG to PDA) and prior PCI, severe mitral regurgitation s/p mitral clip (6/19), severe pulmonary HTN, CKD IV (b/l Cr 2-2.2), hypothyroidism presented with two days of SOB. In the ED was given asa 162 mg, zosyn x1, vanc 1 g. VS in ED 98 F, BP 86/54, HR 54, RR 48, placed on Bipap 10/5. Pt admitted to CCU for acute decompensated heart failure. She was diuresed and successfully weaned off bipap to room air. Trops were elevated to 1800, pt was started on heparin gtt for NSTEMI. Pt underwent RHC and placement of [place] and intra-aortic balloon pump. RHC showed elevated filling pressures and borderline low CI (2.3) with /80s s/p IABP. Was diuresed with improvement in filling pressures but has persistent renal dysfunction. LHC deferred in light of MERVIN on CKD, creatinine in 3 range from baseline of 2. Heart Failure team was consulted. Pt was   Pt had two episodes of SVT which resolved s/p 6 mg adenosine IVP. Metoprolol 12.5 mg BID started with good response- no further episodes of SVT. Weaned off of intra-aortic balloon pump. Due to leukocytosis empiric vanc/zosyn were given, switched to cefepime to be completed on 10/14 for 7 day course. Stable for transfer to floors.     ASSESSMENT & PLAN:   74 y/o F w/ PMH of HTN, HLD, DM2, GERD, CAD s/p CABG (LIMA to LAD, SVG to OM, SVG to PDA; 2014), severe MR s/p MitraClip, severe pHTN, CKD4, hypoTH c/o dyspnea x 2 days with concern for NSTEMI +/- ADHF w/ evidence of organ hypoperfusion.    #NSTEMI   -C/w ASA/clopidogrel. C/w hep gtt. C/w vol optimize. S/p IABP to improve coronary perfusion. unlikely to undergo LHC in light of poor renal function, will cw medical mgmt.    #HFrEF   -s/p bumex   -strict I/O's  -cw metoprolol 12.5 BID    #Transaminitis in setting of congestive hepatopathy   -Trend liver tests, downtrending   -cw home protonix     #T2DM   -Trend BGFS on Lantus and Humalog. endo recs appreciated  -C/w home med Synthroid.     #MERVIN on CKD likely 2/2 acute congestion. continue to monitor creatinine.      #Leukocytosis.   Sepsis w/u unremarkable thus far; c/w Cefepime given MERVIN, to complete 7 day course on 10/14    FOR FOLLOW UP:  [ ]   [ ]   [ ] CCU Transfer Note    Transfer from: CCU    Transfer to: (  ) Medicine    (  ) Telemetry    (  ) RCU                               (  ) Palliative    (  ) Stroke Unit    (  ) MICU    (  ) __________________    Accepting physician:    Sign out given to:     HPI / CCU COURSE:  72 yo F with HTN, HLD, DM2 (A1c 7.5 10/19), CAD s/p CABG (LIMA to LAD, SVG to OM, SVG to PDA) and prior PCI, severe mitral regurgitation s/p mitral clip (6/19), severe pulmonary HTN, CKD IV (b/l Cr 2-2.2), hypothyroidism presented with two days of SOB. In the ED was given asa 162 mg, zosyn x1, vanc 1 g. VS in ED 98 F, BP 86/54, HR 54, RR 48, placed on Bipap 10/5. Pt admitted to CCU for acute decompensated heart failure. She was diuresed and successfully weaned off bipap to room air. Trops were elevated to 1800, pt was started on heparin gtt for NSTEMI. Pt underwent RHC and placement of [place] and intra-aortic balloon pump. RHC showed elevated filling pressures and borderline low CI (2.3) with /80s s/p IABP. Was diuresed with improvement in filling pressures but has persistent renal dysfunction. LHC deferred in light of MERVIN on CKD, creatinine in 3 range from baseline of 2. Heart Failure team was consulted. Advised to wean off IABP. On 10/10 pt had two episodes of SVT which resolved s/p 6 mg adenosine IVP. Metoprolol 12.5 mg BID started with good response- no further episodes of SVT. Weaned off of intra-aortic balloon pump. Due to leukocytosis empiric vanc/zosyn were given, switched to cefepime to be completed on 10/14 for 7 day course, cultures negative to date. Stable for transfer to floors.     ASSESSMENT & PLAN:   72 y/o F w/ PMH of HTN, HLD, DM2, GERD, CAD s/p CABG (LIMA to LAD, SVG to OM, SVG to PDA; 2014), severe MR s/p MitraClip, severe pHTN, CKD4, hypoTH c/o dyspnea x 2 days with concern for NSTEMI +/- ADHF w/ evidence of organ hypoperfusion s/p balloon pump.    #NSTEMI   -C/w ASA/clopidogrel. C/w hep gtt. S/p IABP to improve coronary perfusion. unlikely to undergo LHC in light of poor renal function, will cw medical mgmt.    #HFrEF   -s/p bumex   -strict I/O's  -cw metoprolol 12.5 BID    #Transaminitis in setting of congestive hepatopathy   -Trend liver tests, downtrending   -cw home protonix     #T2DM   -Trend BGFS on Lantus and Humalog. endo recs appreciated  -C/w home med Synthroid.     #MERVIN on CKD likely 2/2 acute congestion. continue to monitor creatinine.      #Leukocytosis.   Sepsis w/u unremarkable thus far; c/w Cefepime given MERVIN, to complete 7 day course on 10/14    FOR FOLLOW UP:  [ ] monitor I/O's, follow up heart failure recs  [ ] monitor right groin site s/p removal of intraaortic balloon pump   [ ] trend LFTs   [ ] monitor finger sticks-- endo following   [ ] completing cefepime on 10/14 CCU Transfer Note    Transfer from: CCU    Transfer to: (  ) Medicine    (  ) Telemetry    (  ) RCU                               (  ) Palliative    (  ) Stroke Unit    (  ) MICU    (  ) __________________    Accepting physician:    Sign out given to:     HPI / CCU COURSE:  74 yo F with HTN, HLD, DM2 (A1c 7.5 10/19), CAD s/p CABG (LIMA to LAD, SVG to OM, SVG to PDA) and prior PCI, severe mitral regurgitation s/p mitral clip (6/19), severe pulmonary HTN, CKD IV (b/l Cr 2-2.2), hypothyroidism presented with two days of SOB. In the ED was given asa 162 mg, zosyn x1, vanc 1 g. VS in ED 98 F, BP 86/54, HR 54, RR 48, placed on Bipap 10/5. Pt admitted to CCU for acute decompensated heart failure. She was diuresed and successfully weaned off bipap to room air. Trops were elevated to 1800, pt was started on heparin gtt for NSTEMI. Pt underwent RHC and placement of [place] and intra-aortic balloon pump. RHC showed elevated filling pressures and borderline low CI (2.3) with /80s s/p IABP. Was diuresed with improvement in filling pressures but has persistent renal dysfunction. LHC deferred in light of MERVIN on CKD, creatinine in 3 range from baseline of 2. Heart Failure team was consulted. Advised to wean off IABP. On 10/10 pt had two episodes of SVT which resolved s/p 6 mg adenosine IVP. Metoprolol 12.5 mg BID started with good response- no further episodes of SVT. Weaned off of intra-aortic balloon pump. Due to leukocytosis empiric vanc/zosyn were given, switched to cefepime to be completed on 10/14 for 7 day course, cultures negative to date. Became hypotensive to 70s/40s after metoprolol dose was increased to 25 mg, required vasopressin. Lactate was stable and low suspicion for cardiogenic shock. Stable for transfer to floors.     ASSESSMENT & PLAN:   72 y/o F w/ PMH of HTN, HLD, DM2, GERD, CAD s/p CABG (LIMA to LAD, SVG to OM, SVG to PDA; 2014), severe MR s/p MitraClip, severe pHTN, CKD4, hypoTH c/o dyspnea x 2 days with concern for NSTEMI +/- ADHF w/ evidence of organ hypoperfusion s/p balloon pump.    #NSTEMI   -C/w ASA/clopidogrel. C/w hep gtt. S/p IABP to improve coronary perfusion. deferring LHC at this time in light of poor renal function, will cw medical mgmt for now.     #HFrEF   -s/p bumex   -strict I/O's  -cw metoprolol 12.5 BID    #Transaminitis in setting of congestive hepatopathy   -Trend liver tests, downtrending   -cw home protonix     #T2DM   -Trend BGFS on Lantus and Humalog. endo recs appreciated  -C/w home med Synthroid.     #MERVIN on CKD likely 2/2 acute congestion. continue to monitor creatinine.      #Leukocytosis  Sepsis w/u unremarkable thus far; c/w Cefepime given MERVIN, to complete 7 day course on 10/14    FOR FOLLOW UP:  [ ] monitor I/O's, follow up heart failure recs  [ ] monitor right groin site s/p removal of intraaortic balloon pump   [ ] trend LFTs   [ ] monitor finger sticks-- endo following   [ ] completing cefepime on 10/14 CCU Transfer Note    Transfer from: CCU    Transfer to: (  ) Medicine    (  ) Telemetry    (  ) RCU                               (  ) Palliative    (  ) Stroke Unit    (  ) MICU    (  ) __________________    Accepting physician: Dr. Salazar     Sign out given to:     HPI / CCU COURSE:  72 yo F with HTN, HLD, DM2 (A1c 7.5 10/19), CAD s/p CABG (LIMA to LAD, SVG to OM, SVG to PDA) and prior PCI, severe mitral regurgitation s/p mitral clip (6/19), severe pulmonary HTN, CKD IV (b/l Cr 2-2.2), hypothyroidism presented with two days of SOB. In the ED was given asa 162 mg, zosyn x1, vanc 1 g. VS in ED 98 F, BP 86/54, HR 54, RR 48, placed on Bipap 10/5. Pt admitted to CCU for acute decompensated heart failure. She was diuresed and successfully weaned off bipap to room air. Trops were elevated to 1800, pt was started on heparin gtt for NSTEMI. Pt underwent RHC and placement of [place] and intra-aortic balloon pump. RHC showed elevated filling pressures and borderline low CI (2.3) with /80s s/p IABP. Was diuresed with improvement in filling pressures but has persistent renal dysfunction. LHC deferred in light of MERVIN on CKD, creatinine in 3 range from baseline of 2. Heart Failure team was consulted. Advised to wean off IABP. On 10/10 pt had two episodes of SVT which resolved s/p 6 mg adenosine IVP. Metoprolol 12.5 mg BID started with good response- no further episodes of SVT. Weaned off of intra-aortic balloon pump. Due to leukocytosis empiric vanc/zosyn were given, switched to cefepime to be completed on 10/14 for 7 day course, cultures negative to date. Became hypotensive to 70s/40s after metoprolol dose was increased to 25 mg, required vasopressin. Lactate was stable and low suspicion for cardiogenic shock. Stable for transfer to floors.     ASSESSMENT & PLAN:   72 y/o F w/ PMH of HTN, HLD, DM2, GERD, CAD s/p CABG (LIMA to LAD, SVG to OM, SVG to PDA; 2014), severe MR s/p MitraClip, severe pHTN, CKD4, hypoTH c/o dyspnea x 2 days with concern for NSTEMI +/- ADHF w/ evidence of organ hypoperfusion s/p balloon pump.    #NSTEMI   -C/w ASA/clopidogrel. C/w hep gtt. S/p IABP to improve coronary perfusion. deferring LHC at this time in light of poor renal function, will cw medical mgmt for now.     #HFrEF   -s/p bumex   -strict I/O's  -cw metoprolol 12.5 BID    #Transaminitis in setting of congestive hepatopathy   -Trend liver tests, downtrending   -cw home protonix     #T2DM   -Trend BGFS on Lantus and Humalog. endo recs appreciated  -C/w home med Synthroid.     #MERVIN on CKD likely 2/2 acute congestion. continue to monitor creatinine.      #Leukocytosis  Sepsis w/u unremarkable thus far; c/w Cefepime given MERVIN, to complete 7 day course on 10/14    FOR FOLLOW UP:  [ ] monitor I/O's, follow up heart failure recs  [ ] monitor right groin site s/p removal of intraaortic balloon pump   [ ] trend LFTs   [ ] monitor finger sticks-- endo following   [ ] completing cefepime on 10/14 CCU Transfer Note    Transfer from: CCU    Transfer to: (  ) Medicine    (  ) Telemetry    (  ) RCU                               (  ) Palliative    (  ) Stroke Unit    (  ) MICU    (  ) __________________    Accepting physician: Dr. Salazar     Sign out given to:     HPI / CCU COURSE:  74 yo F with HTN, HLD, DM2 (A1c 7.5 10/19), CAD s/p CABG (LIMA to LAD, SVG to OM, SVG to PDA) and prior PCI, severe mitral regurgitation s/p mitral clip (6/19), severe pulmonary HTN, CKD IV (b/l Cr 2-2.2), hypothyroidism presented with two days of SOB. In the ED was given asa 162 mg, zosyn x1, vanc 1 g. VS in ED 98 F, BP 86/54, HR 54, RR 48, placed on Bipap 10/5. Pt admitted to CCU for acute decompensated heart failure. She was diuresed and successfully weaned off bipap to room air. Trops were elevated to 1800, pt was started on heparin gtt for NSTEMI. Pt underwent RHC and placement of [place] and intra-aortic balloon pump. RHC showed elevated filling pressures and borderline low CI (2.3) with /80s s/p IABP. Was diuresed with improvement in filling pressures but has persistent renal dysfunction. LHC deferred in light of MERVIN on CKD, creatinine in 3 range from baseline of 2. Heart Failure team was consulted. Advised to wean off IABP. On 10/10 pt had two episodes of SVT which resolved s/p 6 mg adenosine IVP. Metoprolol 12.5 mg BID started with good response- no further episodes of SVT. Weaned off of intra-aortic balloon pump. Due to leukocytosis empiric vanc/zosyn were given, switched to cefepime to be completed on 10/14 for 7 day course, cultures negative to date. Became hypotensive to 70s/40s after metoprolol dose was increased to 25 mg, required vasopressin. Lactate was stable and low suspicion for cardiogenic shock. s/p 1 unit prbcs with good response. Weaned off vaso to midodrine. started metoprolol 12.5 mg for AVNRT- tolerated well. Stable for transfer to floors.     ASSESSMENT & PLAN:   72 y/o F w/ PMH of HTN, HLD, DM2, GERD, CAD s/p CABG (LIMA to LAD, SVG to OM, SVG to PDA; 2014), severe MR s/p MitraClip, severe pHTN, CKD4, hypoTH c/o dyspnea x 2 days with concern for NSTEMI +/- ADHF w/ evidence of organ hypoperfusion s/p balloon pump.    #NSTEMI   -C/w ASA/clopidogrel. C/w hep gtt. S/p IABP to improve coronary perfusion. deferring LHC at this time in light of poor renal function, will cw medical mgmt for now.     #AVNRT  -cw metoprolol 12.5 mg BID. if SVT recurs give 6 mg IVP of adenosine or try vagal maneuvers.     #HFrEF   -s/p bumex   -strict I/O's  -cw metoprolol 12.5 BID    #Transaminitis in setting of congestive hepatopathy   -Trend liver tests, downtrending   -cw home protonix     #T2DM   -Trend BGFS on Lantus and Humalog. endo recs appreciated  -C/w home med Synthroid.     #MERVIN on CKD likely 2/2 acute congestion. continue to monitor creatinine- downtrending.       #Leukocytosis  Sepsis w/u unremarkable thus far; c/w Cefepime given MERVIN, to complete 7 day course on 10/14    FOR FOLLOW UP:  [ ] monitor BPs-- cw midodrine 10 mg Q8h   [ ] monitor on tele-- cw metoprolol 12.5 mg BID for AVNRT   [ ] monitor finger sticks-- endo following CCU Transfer Note    Transfer from: CCU    Transfer to: (  ) Medicine    (  ) Telemetry    (  ) RCU                               (  ) Palliative    (  ) Stroke Unit    (  ) MICU    (  ) __________________    Accepting physician: Dr. Salazar     Sign out given to:     HPI / CCU COURSE:  74 yo F with HTN, HLD, DM2 (A1c 7.5 10/19), CAD s/p CABG (LIMA to LAD, SVG to OM, SVG to PDA) and prior PCI, severe mitral regurgitation s/p mitral clip (6/19), severe pulmonary HTN, CKD IV (b/l Cr 2-2.2), hypothyroidism presented with two days of SOB. In the ED was given asa 162 mg, zosyn x1, vanc 1 g. VS in ED 98 F, BP 86/54, HR 54, RR 48, placed on Bipap 10/5. Pt admitted to CCU for acute decompensated heart failure. She was diuresed and successfully weaned off bipap to room air. Trops were elevated to 1800, pt was started on heparin gtt for NSTEMI. Pt underwent RHC and placement of [place] and intra-aortic balloon pump. RHC showed elevated filling pressures and borderline low CI (2.3) with /80s s/p IABP. Was diuresed with improvement in filling pressures but has persistent renal dysfunction. LHC deferred in light of MERVIN on CKD, creatinine in 3 range from baseline of 2. Heart Failure team was consulted. Advised to wean off IABP. On 10/10 pt had two episodes of SVT which resolved s/p 6 mg adenosine IVP. Metoprolol 12.5 mg BID started with good response- no further episodes of SVT. Weaned off of intra-aortic balloon pump. Due to leukocytosis empiric vanc/zosyn were given, switched to cefepime to be completed on 10/14 for 7 day course, cultures negative to date. Became hypotensive to 70s/40s after metoprolol dose was increased to 25 mg, required vasopressin. Lactate was stable and low suspicion for cardiogenic shock. s/p 1 unit prbcs with good response- Hgbs remained in 9 range. 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 Pt was weaned off vaso to midodrine 10 mg TID. She tolerated metoprolol 12.5 mg and was stable for transfer to floors.     ASSESSMENT & PLAN:   74 y/o F w/ PMH of HTN, HLD, DM2, GERD, CAD s/p CABG (LIMA to LAD, SVG to OM, SVG to PDA; 2014), severe MR s/p MitraClip, severe pHTN, CKD4, hypoTH c/o dyspnea x 2 days with concern for NSTEMI +/- ADHF w/ evidence of organ hypoperfusion s/p balloon pump.    #NSTEMI   -downtrending trops. S/p IABP to improve coronary perfusion. Deferred LHC in light of poor renal function and daughter's concern about pt requiring dialysis after cath.   -cw medical management- ASA/clopidogrel/statin    #AVNRT  -cw metoprolol 12.5 mg BID. if SVT recurs give 6 mg IVP of adenosine or try vagal maneuvers.     #HFrEF   -s/p diuresis and IABP  -strict I/O's, daily standing weights   -cw metoprolol 12.5 BID    #Anemia  -became anemic to 7's, baseline ~9  -iron studies 10/13 showing normal ferritin/iron binding capacity. normal haptoglobin/bili.   -s/p 1 unit PRBCS for Hb < 8. responded appropriately to > 9. stable.     #T2DM   -Trend BGFS on Lantus and Humalog. endo recs appreciated  -C/w home med Synthroid    #CKD   -continue to monitor creatinine- now at baseline. renal recs appreciated.   -follow up renal workup labs- specimen received as of 10/14: DEAN, C-ANCA, SPEP/UPEP, cryoglobulin, immunofixation, RPR, syphilis screen, Hep B. C3, C4 normal.    -avoid nephrotoxins, trend BMP    #Leukocytosis to 10-12 since admission  -Sepsis w/u unremarkable thus far. BCx and UCx 10/8 negative. s/p 7 days IV abx. s/p Vanc/zosyn 10/7-10/9. cefepime 10/9-10/15  -monitor for signs of infection, no clear source at this time and pt remains afebrile     #Transaminitis in setting of congestive hepatopathy   -on admission elevated to 2000s. now normalizing   -trend BMP    #GERD  -cw home protonix     FOR FOLLOW UP:  [ ] monitor BPs-- on midodrine 10 mg Q8h   [ ] monitor on tele-- cw metoprolol 12.5 mg BID for AVNRT   [ ] monitor finger sticks-- endo following CCU Transfer Note    Transfer from: CCU    Transfer to: (  ) Medicine    (x  ) Telemetry    (  ) RCU                               (  ) Palliative    (  ) Stroke Unit    (  ) MICU    (  ) __________________    Accepting physician: Dr. Salazar     Sign out given to:     HPI / CCU COURSE:  72 yo F with HTN, HLD, DM2 (A1c 7.5 10/19), CAD s/p CABG (LIMA to LAD, SVG to OM, SVG to PDA) and prior PCI, severe mitral regurgitation s/p mitral clip (6/19), severe pulmonary HTN, CKD IV (b/l Cr 2-2.2), hypothyroidism presented with two days of SOB. In the ED was given asa 162 mg, zosyn x1, vanc 1 g. VS in ED 98 F, BP 86/54, HR 54, RR 48, placed on Bipap 10/5. Pt admitted to CCU for acute decompensated heart failure. She was diuresed and successfully weaned off bipap to room air. Trops were elevated to 1800, pt was started on heparin gtt for NSTEMI. Pt underwent RHC and placement of [place] and intra-aortic balloon pump. RHC showed elevated filling pressures and borderline low CI (2.3) with /80s s/p IABP. Was diuresed with improvement in filling pressures but has persistent renal dysfunction. LHC deferred in light of MERVIN on CKD, creatinine in 3 range from baseline of 2. Heart Failure team was consulted. Advised to wean off IABP. On 10/10 pt had two episodes of SVT which resolved s/p 6 mg adenosine IVP. Metoprolol 12.5 mg BID started with good response- no further episodes of SVT. Weaned off of intra-aortic balloon pump. Due to leukocytosis empiric vanc/zosyn were given, switched to cefepime to be completed on 10/14 for 7 day course, cultures negative to date. Became hypotensive to 70s/40s after metoprolol dose was increased to 25 mg, required vasopressin. Lactate was stable and low suspicion for cardiogenic shock. s/p 1 unit prbcs with good response- Hgbs remained in 9 range. 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 Pt was weaned off vaso to midodrine 10 mg TID. She tolerated metoprolol 12.5 mg and was stable for transfer to floors.     ASSESSMENT & PLAN:   74 y/o F w/ PMH of HTN, HLD, DM2, GERD, CAD s/p CABG (LIMA to LAD, SVG to OM, SVG to PDA; 2014), severe MR s/p MitraClip, severe pHTN, CKD4, hypoTH c/o dyspnea x 2 days with concern for NSTEMI +/- ADHF w/ evidence of organ hypoperfusion s/p balloon pump.    #NSTEMI   -downtrending trops. S/p IABP to improve coronary perfusion. Deferred LHC in light of poor renal function and daughter's concern about pt requiring dialysis after cath.   -cw medical management- ASA/clopidogrel/statin    #AVNRT  -cw metoprolol 12.5 mg BID. if SVT recurs give 6 mg IVP of adenosine or try vagal maneuvers.     #HFrEF   -s/p diuresis and IABP  -strict I/O's, daily standing weights   -cw metoprolol 12.5 BID    #Anemia  -became anemic to 7's, baseline ~9  -iron studies 10/13 showing normal ferritin/iron binding capacity. normal haptoglobin/bili.   -s/p 1 unit PRBCS for Hb < 8. responded appropriately to > 9. stable.     #T2DM   -Trend BGFS on Lantus and Humalog. endo recs appreciated  -C/w home med Synthroid    #CKD   -continue to monitor creatinine- now at baseline. renal recs appreciated.   -follow up renal workup labs- specimen received as of 10/14: DEAN, C-ANCA, SPEP/UPEP, cryoglobulin, immunofixation, RPR, syphilis screen, Hep B. C3, C4 normal.    -avoid nephrotoxins, trend BMP    #Leukocytosis to 10-12 since admission  -Sepsis w/u unremarkable thus far. BCx and UCx 10/8 negative. s/p 7 days IV abx. s/p Vanc/zosyn 10/7-10/9. cefepime 10/9-10/15  -monitor for signs of infection, no clear source at this time and pt remains afebrile     #Transaminitis in setting of congestive hepatopathy   -on admission elevated to 2000s. now normalizing   -trend BMP    #GERD  -cw home protonix     FOR FOLLOW UP:  [ ] monitor BPs-- on midodrine 10 mg Q8h   [ ] monitor on tele-- cw metoprolol 12.5 mg BID for AVNRT   [ ] monitor finger sticks-- endo following

## 2019-10-11 NOTE — PROGRESS NOTE ADULT - SUBJECTIVE AND OBJECTIVE BOX
====================  CCU MIDNIGHT ROUNDS  ====================    SELVIN CLAIRE  [id number]  Patient is a 73y old  Female who presents with a chief complaint of Acute decompensated heart failure, SOB (11 Oct 2019 20:38)    ====================  SUMMARY: 74 y/o F w/ PMH of HTN, HLD, DM2, GERD, CAD s/p CABG (LIMA to LAD, SVG to OM, SVG to PDA; 2014), severe MR s/p MitraClip, severe pHTN, CKD4, hypoTH c/o dyspnea x 2D suspect 2/2 NSTEMI +/- ADHF w/ e/o organ hypoperfusion.  ====================        ====================  NEW EVENTS: Patient seen and examined at bedside. Vital signs stable overnight. S/p removal of IABP and [place] janessa catheter. Given 1x dose of Lopressor 12.5 mg 2/2 SVT (). ROS negative unless otherwise stated.   ====================        ====================  Vital Signs (last 12 hrs):  ====================    T(C): 36.7 (10-11-19 @ 14:00), Max: 36.7 (10-11-19 @ 14:00)  T(F): 98.1 (10-11-19 @ 14:00), Max: 98.1 (10-11-19 @ 14:00)  HR: 78 (10-11-19 @ 21:30) (64 - 148)  BP: --  BP(mean): --  ABP: 102/44 (10-11-19 @ 21:30) (72/38 - 163/20)  ABP(mean): 66 (10-11-19 @ 21:30) (50 - 70)  RR: 28 (10-11-19 @ 21:30) (16 - 31)  SpO2: 100% (10-11-19 @ 21:30) (97% - 100%)  Wt(kg): --  CVP(mm Hg): 11 (10-11-19 @ 15:00) (4 - 11)  CVP(cm H2O): --  CO: --  CI: --  PA: 19/10 (10-11-19 @ 15:00) (19/10 - 38/14)  PA(mean): 15 (10-11-19 @ 15:00) (15 - 23)  PCWP: --  SVR: --  PVR: --    I&O's Summary    10 Oct 2019 07:01  -  11 Oct 2019 07:00  --------------------------------------------------------  IN: 1099 mL / OUT: 2205 mL / NET: -1106 mL    11 Oct 2019 07:01  -  11 Oct 2019 21:47  --------------------------------------------------------  IN: 781.5 mL / OUT: 1075 mL / NET: -293.5 mL            ====================  NEW LABS:  ====================                        7.8    11.74 )-----------( 226      ( 11 Oct 2019 04:59 )             25.6     10-11    140  |  103  |  108<H>  ----------------------------<  151<H>  4.4   |  21<L>  |  3.17<H>    Ca    9.2      11 Oct 2019 05:00  Phos  4.6     10-11  Mg     2.6     10-11    TPro  7.3  /  Alb  3.4  /  TBili  0.6  /  DBili  x   /  AST  682<H>  /  ALT  1395<H>  /  AlkPhos  135<H>  10-11    PT/INR - ( 10 Oct 2019 06:08 )   PT: 16.3 sec;   INR: 1.41 ratio         PTT - ( 11 Oct 2019 09:55 )  PTT:81.0 sec      ABG - ( 11 Oct 2019 09:47 )  pH, Arterial: 7.44  pH, Blood: x     /  pCO2: 34    /  pO2: 104   / HCO3: 23    / Base Excess: -.7   /  SaO2: 98                Blood Gas Source, Mixed: Mixed Blood Gas (10-11-19 @ 09:47)  Blood Gas Source, Mixed: Mixed Blood Gas (10-11-19 @ 05:01)  Blood Gas Source, Mixed: Mixed Blood Gas (10-10-19 @ 23:40)      ====================  Assessment & Plan:  #CV  Vessels - NSTEMI. C/w ASA/clopidogrel. S/p hep gtt. C/w vol optimize. IABP removed. After multi-disciplinary discussion, risks of LHC likely outweigh benefits; will not pursue for now.  Pump - Appr CHF recs. Severely reduced LVEF. Hold further diuretics. Strict Is/Os. V  [place] - S/p MitraClip. No active issues.  Rhythm - Runs of SVT w/ adenosine. C/w metop 12.5mg BID, can increase to 25mg BID if BP tolerates; c/w cardiac monitoring.    #Neuro - No active issues. Pain control PRN.  #Resp - Vol optimized. SpO2 well on RA now.  #GI - Heart healthy diet. Trend liver tests; improving w/ improving vol status.  #Endo - Trend BGFS; cover PRN. C/w home med Synthroid. Check TFTs.  #Renal - Vol optimize, as above. MERVIN likely 2/2 acute congestion.  #ID - Leukocytosis suspect 2/2 PNA. C/w cefepime; hold vanco. Complete 7D total course of abxs.  #Hem - Full dose heparin, as above.  #Ethics - FC. C/w care in CCU.      ====================

## 2019-10-11 NOTE — CHART NOTE - NSCHARTNOTEFT_GEN_A_CORE
Removal of Femoral Sheath of Intra-Aortic Balloon Pump.    Pulses in the right lower extremity are audible by doppler. The patient was placed in the supine position. The insertion site was identified and the sutures were removed per protocol.  The 8 [place] femoral sheath was then removed. Direct pressure was applied for 30 minutes.   Pulses of the right lower extremity were identified again post-sheath removal via doppler.    Monitoring of the right groin and both lower extremities including neuro-vascular checks and vital signs every 15 minutes x 4, then every 30 minutes x 2, then every 1 hour was ordered.    Complications: None/Other    Comments:  No hematoma

## 2019-10-11 NOTE — PROGRESS NOTE ADULT - SUBJECTIVE AND OBJECTIVE BOX
CHIEF COMPLAINT: patient was seen earlier this a.m. condition unchanged still feel weak    Patient is a 73y old  Female who presents with a chief complaint of Acute decompensated heart failure, SOB (10 Oct 2019 23:21)      INTERVAL HISTORY/OVERNIGHT EVENTS: Yesterday pt had two episodes of SVT. One episode resolved with lidocaine and adenosine.   Second episode resolved with adenosine. No acute events overnight.   Pt seen and examined at bedside. States she feels  weak but states she has no pain or shortness of breath.   SUBJECTIVE:     REVIEW OF SYSTEMS:    CONSTITUTIONAL: (x  )  weakness,  (  ) fevers or chills  EYES/ENT: (  )visual changes;     NECK: (  ) pain or stiffness  RESPIRATORY:   (  )cough, wheezing, hemoptysis;  (  ) shortness of breath  CARDIOVASCULAR:  (  )chest pain or palpitations  GASTROINTESTINAL:   (  )abdominal or epigastric pain.  (  ) nausea, vomiting, or hematemesis;   (   ) diarrhea or constipation.   GENITOURINARY:   (    ) dysuria, frequency or hematuria  NEUROLOGICAL:  (   ) numbness or weakness   All other review of systems is negative unless indicated above    Vital Signs Last 24 Hrs  T(C): 36.7 (11 Oct 2019 14:00), Max: 37 (11 Oct 2019 07:00)  T(F): 98.1 (11 Oct 2019 14:00), Max: 98.6 (11 Oct 2019 07:00)  HR: 80 (11 Oct 2019 20:30) (62 - 148)  BP: --  BP(mean): --  RR: 17 (11 Oct 2019 20:30) (12 - 31)  SpO2: 100% (11 Oct 2019 20:30) (97% - 100%)    I&O's Summary    10 Oct 2019 07:01  -  11 Oct 2019 07:00  --------------------------------------------------------  IN: 1099 mL / OUT: 2205 mL / NET: -1106 mL    11 Oct 2019 07:01  -  11 Oct 2019 20:38  --------------------------------------------------------  IN: 661.5 mL / OUT: 1025 mL / NET: -363.5 mL        CAPILLARY BLOOD GLUCOSE      POCT Blood Glucose.: 241 mg/dL (11 Oct 2019 17:40)  POCT Blood Glucose.: 304 mg/dL (11 Oct 2019 12:35)  POCT Blood Glucose.: 209 mg/dL (10 Oct 2019 22:01)      PHYSICAL EXAM:       General: ill-appearing elderly woman lying in bed on room air, appears comfortable  HEENT: PERRLA, EOMI, moist mucous membranes  Respiratory: clear bilaterally   CV: RRR, S1S2, no murmurs, rubs or gallops  Abdominal: Soft, nontender, nondistended  Extremities: no edema, + peripheral pulses  Skin: right femoral balloon pump in place, appears clean/dry/intact, good pulses    : +Oscar   MEDICATIONS:  MEDICATIONS  (STANDING):  aspirin enteric coated 81 milliGRAM(s) Oral daily  cefepime   IVPB 1000 milliGRAM(s) IV Intermittent every 24 hours  chlorhexidine 2% Cloths 1 Application(s) Topical <User Schedule>  clopidogrel Tablet 75 milliGRAM(s) Oral daily  dextrose 5%. 1000 milliLiter(s) (50 mL/Hr) IV Continuous <Continuous>  dextrose 50% Injectable 12.5 Gram(s) IV Push once  dextrose 50% Injectable 25 Gram(s) IV Push once  dextrose 50% Injectable 25 Gram(s) IV Push once  docusate sodium 100 milliGRAM(s) Oral two times a day  heparin  Infusion.  Unit(s)/Hr (6.5 mL/Hr) IV Continuous <Continuous>  insulin glargine Injectable (LANTUS) 30 Unit(s) SubCutaneous at bedtime  insulin lispro (HumaLOG) corrective regimen sliding scale   SubCutaneous at bedtime  insulin lispro (HumaLOG) corrective regimen sliding scale   SubCutaneous three times a day before meals  insulin lispro Injectable (HumaLOG) 8 Unit(s) SubCutaneous three times a day before meals  levothyroxine 50 MICROGram(s) Oral daily  melatonin 5 milliGRAM(s) Oral at bedtime  metoprolol tartrate 12.5 milliGRAM(s) Oral two times a day  pantoprazole    Tablet 40 milliGRAM(s) Oral before breakfast  polyethylene glycol 3350 17 Gram(s) Oral daily  senna 2 Tablet(s) Oral at bedtime      LABS: All Labs Reviewed:                        7.8    11.74 )-----------( 226      ( 11 Oct 2019 04:59 )             25.6     10-11    140  |  103  |  108<H>  ----------------------------<  151<H>  4.4   |  21<L>  |  3.17<H>    Ca    9.2      11 Oct 2019 05:00  Phos  4.6     10-11  Mg     2.6     10-11    TPro  7.3  /  Alb  3.4  /  TBili  0.6  /  DBili  x   /  AST  682<H>  /  ALT  1395<H>  /  AlkPhos  135<H>  10-11    PT/INR - ( 10 Oct 2019 06:08 )   PT: 16.3 sec;   INR: 1.41 ratio         PTT - ( 11 Oct 2019 09:55 )  PTT:81.0 sec      Blood Culture: 10-08 @ 14:47  Organism --  Gram Stain Blood -- Gram Stain --  Specimen Source .Blood  Culture-Blood --    10-08 @ 05:03  Organism --  Gram Stain Blood -- Gram Stain --  Specimen Source .Urine  Culture-Blood --    10-08 @ 01:44  Organism --  Gram Stain Blood -- Gram Stain --  Specimen Source .Blood  Culture-Blood --      Urine Culture      RADIOLOGY/EKG:   from: Xray Chest 1 View- PORTABLE-Routine (10.11.19 @ 09:22) >    INTERPRETATION:  Clinical information: Line placement.    Single AP radiograph of the chest was obtained and compared to previous   study of 10/10/2019.    Heart size cannot be adequately assessed on this exam. Patient is status   post median sternotomy. Microclip is noted in place. [place]-Anig catheter   via the inferior vena cava approach is noted with tip in the left lower   lobe pulmonary artery. Intra-aortic balloon pump is unchanged in its   location when compared to previous exam. Lungs are clear. Visualized   osseous structures are within normal limits.    Impression: [place]-Angi catheter with tip in the left lower lobe pulmonary   artery.    ASSESSMENT AND PLAN:  74 y/o F w/ PMH of HTN, HLD, DM2, GERD, CAD s/p CABG (LIMA to LAD, SVG to OM, SVG to PDA; 2014), severe MR s/p MitraClip, severe pHTN, CKD4, hypoTH c/o dyspnea x 2 days suspect 2/2  NSTEMI +/- ADHF w/ e/o organ hypoperfusion.    #CV    CAD - Elev troponin suspect possible NSTEMI. C/w ASA/clopidogrel. C/w hep gtt. C/w vol optimize. S/p IABP to improve coronary perfusion. ?  LHC  if renal function improved       #Neuro - No active issues  #Resp - Off supplemental O2, sating well on RA. Vol optimize, as above.  #GI - Heart healthy diet. Trend liver tests, downtrending   #Endo -  continue Lantus 30 units and Humalog 8 units before meal her blood sugar is much better controlled than previous admission  #Renal - Vol optimize, as above. MERVIN likely 2/2 acute congestion. continue to monitor creatinine.    #ID - Leukocytosis. Sepsis w/u unremarkable thus far; c/w Cefepime given MERVIN, to complete 7 day course on 10/14  #Hem - Full dose heparin, as above.  DVT PPX:    ADVANCED DIRECTIVE:    DISPOSITION:

## 2019-10-11 NOTE — PROGRESS NOTE ADULT - ASSESSMENT
Assessment  DMT2: 73y Female with DM T2 with hyperglycemia, A1C 7.5%, on basal bolus insulin, increased dose yesterday, blood sugars improving, no hypoglycemic episode, eating partial meals, appears short of breath on exam.  SOB: on meds, FU Heart Failure team.  CAD: S/P CABG previous admission, on medications, no chest pain, stable, monitored.  Hypothyroidism: On Synthroid 50 mcg po daily, compliant with Synthroid intake, asymptomatic.  HTN: Controlled,  on antihypertensive medications.  CKD: Monitor labs/BMP,           Jillian Banks MD  Cell: [phone]  Office: [phone] Assessment  DMT2: 73y Female with DM T2 with hyperglycemia, A1C 7.5%, on basal bolus insulin, increased dose yesterday, blood sugars improving, no hypoglycemic episode, eating partial meals,  appears short of breath on exam.  SOB: on meds, FU Heart Failure team.  CAD: S/P CABG previous admission, on medications, no chest pain, stable, monitored.  Hypothyroidism: On Synthroid 50 mcg po daily, compliant with Synthroid intake, asymptomatic.  HTN: Controlled,  on antihypertensive medications.  CKD: Monitor labs/BMP,           Jillian Banks MD  Cell: [phone]  Office: [phone]

## 2019-10-11 NOTE — PROGRESS NOTE ADULT - SUBJECTIVE AND OBJECTIVE BOX
PATIENT:  SELVIN CLAIRE  [id number]    CHIEF COMPLAINT:  Patient is a 73y old  Female who presents with a chief complaint of Acute decompensated heart failure, SOB (10 Oct 2019 23:21)      INTERVAL HISTORY/OVERNIGHT EVENTS:      REVIEW OF SYSTEMS:    Constitutional:     [ ] negative [ ] fevers [ ] chills [ ] weight loss [ ] weight gain  HEENT:                  [ ] negative [ ] dry eyes [ ] eye irritation [ ] postnasal drip [ ] nasal congestion  CV:                         [ ] negative  [ ] chest pain [ ] orthopnea [ ] palpitations [ ] murmur  Resp:                     [ ] negative [ ] cough [ ] shortness of breath [ ] dyspnea [ ] wheezing [ ] sputum [ ] hemoptysis  GI:                          [ ] negative [ ] nausea [ ] vomiting [ ] diarrhea [ ] constipation [ ] abd pain [ ] dysphagia   :                        [ ] negative [ ] dysuria [ ] nocturia [ ] hematuria [ ] increased urinary frequency  Musculoskeletal: [ ] negative [ ] back pain [ ] myalgias [ ] arthralgias [ ] fracture  Skin:                       [ ] negative [ ] rash [ ] itch  Neurological:        [ ] negative [ ] headache [ ] dizziness [ ] syncope [ ] weakness [ ] numbness  Psychiatric:           [ ] negative [ ] anxiety [ ] depression  Endocrine:            [ ] negative [ ] diabetes [ ] thyroid problem  Heme/Lymph:      [ ] negative [ ] anemia [ ] bleeding problem  Allergic/Immune: [ ] negative [ ] itchy eyes [ ] nasal discharge [ ] hives [ ] angioedema    [ ] All other systems negative  [ ] Unable to assess ROS because ________.    MEDICATIONS:  MEDICATIONS  (STANDING):  aspirin enteric coated 81 milliGRAM(s) Oral daily  cefepime   IVPB 1000 milliGRAM(s) IV Intermittent every 24 hours  chlorhexidine 2% Cloths 1 Application(s) Topical <User Schedule>  clopidogrel Tablet 75 milliGRAM(s) Oral daily  dextrose 5%. 1000 milliLiter(s) (50 mL/Hr) IV Continuous <Continuous>  dextrose 50% Injectable 12.5 Gram(s) IV Push once  dextrose 50% Injectable 25 Gram(s) IV Push once  dextrose 50% Injectable 25 Gram(s) IV Push once  docusate sodium 100 milliGRAM(s) Oral two times a day  heparin  Infusion.  Unit(s)/Hr (6.5 mL/Hr) IV Continuous <Continuous>  insulin glargine Injectable (LANTUS) 30 Unit(s) SubCutaneous at bedtime  insulin lispro (HumaLOG) corrective regimen sliding scale   SubCutaneous at bedtime  insulin lispro (HumaLOG) corrective regimen sliding scale   SubCutaneous three times a day before meals  insulin lispro Injectable (HumaLOG) 8 Unit(s) SubCutaneous three times a day before meals  levothyroxine 50 MICROGram(s) Oral daily  melatonin 5 milliGRAM(s) Oral at bedtime  metoprolol tartrate 12.5 milliGRAM(s) Oral two times a day  pantoprazole    Tablet 40 milliGRAM(s) Oral before breakfast  polyethylene glycol 3350 17 Gram(s) Oral daily  senna 2 Tablet(s) Oral at bedtime    MEDICATIONS  (PRN):  ALPRAZolam 0.25 milliGRAM(s) Oral three times a day PRN Anxiety  dextrose 40% Gel 15 Gram(s) Oral once PRN Blood Glucose LESS THAN 70 milliGRAM(s)/deciliter  glucagon  Injectable 1 milliGRAM(s) IntraMuscular once PRN Glucose LESS THAN 70 milligrams/deciliter  heparin  Injectable 3200 Unit(s) IV Push every 6 hours PRN For aPTT less than 40      ALLERGIES:  Allergies    azithromycin (Hives; Pruritus)    Intolerances        OBJECTIVE:  ICU Vital Signs Last 24 Hrs  T(C): 36.7 (11 Oct 2019 04:00), Max: 36.7 (11 Oct 2019 04:00)  T(F): 98.1 (11 Oct 2019 04:00), Max: 98.1 (11 Oct 2019 04:00)  HR: 66 (11 Oct 2019 07:00) (62 - 152)  BP: --  BP(mean): --  ABP: 152/18 (11 Oct 2019 07:00) (108/40 - 170/30)  ABP(mean): 76 (11 Oct 2019 07:00) (68 - 92)  RR: 19 (11 Oct 2019 07:00) (12 - 22)  SpO2: 100% (11 Oct 2019 07:00) (95% - 100%)      Adult Advanced Hemodynamics Last 24 Hrs  CVP(mm Hg): 4 (11 Oct 2019 07:00) (1 - 8)  CVP(cm H2O): --  CO: --  CI: --  PA: 28/13 (11 Oct 2019 07:00) (22/17 - 54/21)  PA(mean): 18 (11 Oct 2019 07:00) (15 - 33)  PCWP: --  SVR: --  SVRI: --  PVR: --  PVRI: --  CAPILLARY BLOOD GLUCOSE      POCT Blood Glucose.: 209 mg/dL (10 Oct 2019 22:01)  POCT Blood Glucose.: 237 mg/dL (10 Oct 2019 16:41)  POCT Blood Glucose.: 258 mg/dL (10 Oct 2019 12:19)    CAPILLARY BLOOD GLUCOSE      POCT Blood Glucose.: 209 mg/dL (10 Oct 2019 22:01)    I&O's Summary    10 Oct 2019 07:01  -  11 Oct 2019 07:00  --------------------------------------------------------  IN: 1099 mL / OUT: 2205 mL / NET: -1106 mL      Daily     Daily Weight in k.4 (11 Oct 2019 05:00)    PHYSICAL EXAMINATION:  General: WN/WD NAD  HEENT: PERRLA, EOMI, moist mucous membranes  Neurology: A&Ox3, nonfocal, CUADRA x 4  Respiratory: CTA B/L, normal respiratory effort, no wheezes, crackles, rales  CV: RRR, S1S2, no murmurs, rubs or gallops  Abdominal: Soft, NT, ND +BS, Last BM  Extremities: No edema, + peripheral pulses  Incisions:   Tubes:    LABS:  ABG - ( 10 Oct 2019 06:01 )  pH, Arterial: 7.46  pH, Blood: x     /  pCO2: 34    /  pO2: 80    / HCO3: 24    / Base Excess: 1.0   /  SaO2: 95                                      7.8    11.74 )-----------( 226      ( 11 Oct 2019 04:59 )             25.6     10-11    140  |  103  |  108<H>  ----------------------------<  151<H>  4.4   |  21<L>  |  3.17<H>    Ca    9.2      11 Oct 2019 05:00  Phos  4.6     10-11  Mg     2.6     10-11    TPro  7.3  /  Alb  3.4  /  TBili  0.6  /  DBili  x   /  AST  682<H>  /  ALT  1395<H>  /  AlkPhos  135<H>  10-11    LIVER FUNCTIONS - ( 11 Oct 2019 05:00 )  Alb: 3.4 g/dL / Pro: 7.3 g/dL / ALK PHOS: 135 U/L / ALT: 1395 U/L / AST: 682 U/L / GGT: x           PT/INR - ( 10 Oct 2019 06:08 )   PT: 16.3 sec;   INR: 1.41 ratio         PTT - ( 11 Oct 2019 01:43 )  PTT:69.0 sec            TELEMETRY:     EKG:     IMAGING: PATIENT:  SELVIN CLAIRE  [id number]    CHIEF COMPLAINT:  Patient is a 73y old  Female who presents with a chief complaint of Acute decompensated heart failure, SOB (10 Oct 2019 23:21)      INTERVAL HISTORY/OVERNIGHT EVENTS: Yesterday pt had two episodes of SVT. One episode resolved with lidocaine and adenosine. Second episode resolved with adenosine. No acute events overnight. Pt seen and examined at bedside. States she feels unwell but states she has no pain or shortness of breath.     MEDICATIONS:  MEDICATIONS  (STANDING):  aspirin enteric coated 81 milliGRAM(s) Oral daily  cefepime   IVPB 1000 milliGRAM(s) IV Intermittent every 24 hours  chlorhexidine 2% Cloths 1 Application(s) Topical <User Schedule>  clopidogrel Tablet 75 milliGRAM(s) Oral daily  dextrose 5%. 1000 milliLiter(s) (50 mL/Hr) IV Continuous <Continuous>  dextrose 50% Injectable 12.5 Gram(s) IV Push once  dextrose 50% Injectable 25 Gram(s) IV Push once  dextrose 50% Injectable 25 Gram(s) IV Push once  docusate sodium 100 milliGRAM(s) Oral two times a day  heparin  Infusion.  Unit(s)/Hr (6.5 mL/Hr) IV Continuous <Continuous>  insulin glargine Injectable (LANTUS) 30 Unit(s) SubCutaneous at bedtime  insulin lispro (HumaLOG) corrective regimen sliding scale   SubCutaneous at bedtime  insulin lispro (HumaLOG) corrective regimen sliding scale   SubCutaneous three times a day before meals  insulin lispro Injectable (HumaLOG) 8 Unit(s) SubCutaneous three times a day before meals  levothyroxine 50 MICROGram(s) Oral daily  melatonin 5 milliGRAM(s) Oral at bedtime  metoprolol tartrate 12.5 milliGRAM(s) Oral two times a day  pantoprazole    Tablet 40 milliGRAM(s) Oral before breakfast  polyethylene glycol 3350 17 Gram(s) Oral daily  senna 2 Tablet(s) Oral at bedtime    MEDICATIONS  (PRN):  ALPRAZolam 0.25 milliGRAM(s) Oral three times a day PRN Anxiety  dextrose 40% Gel 15 Gram(s) Oral once PRN Blood Glucose LESS THAN 70 milliGRAM(s)/deciliter  glucagon  Injectable 1 milliGRAM(s) IntraMuscular once PRN Glucose LESS THAN 70 milligrams/deciliter  heparin  Injectable 3200 Unit(s) IV Push every 6 hours PRN For aPTT less than 40      ALLERGIES:  Allergies    azithromycin (Hives; Pruritus)    Intolerances        OBJECTIVE:  ICU Vital Signs Last 24 Hrs  T(C): 36.7 (11 Oct 2019 04:00), Max: 36.7 (11 Oct 2019 04:00)  T(F): 98.1 (11 Oct 2019 04:00), Max: 98.1 (11 Oct 2019 04:00)  HR: 66 (11 Oct 2019 07:00) (62 - 152)  BP: --  BP(mean): --  ABP: 152/18 (11 Oct 2019 07:00) (108/40 - 170/30)  ABP(mean): 76 (11 Oct 2019 07:00) (68 - 92)  RR: 19 (11 Oct 2019 07:00) (12 - 22)  SpO2: 100% (11 Oct 2019 07:00) (95% - 100%)      Adult Advanced Hemodynamics Last 24 Hrs  CVP(mm Hg): 4 (11 Oct 2019 07:00) (1 - 8)  CVP(cm H2O): --  CO: --  CI: --  PA: 28/13 (11 Oct 2019 07:00) (22/17 - 54/21)  PA(mean): 18 (11 Oct 2019 07:00) (15 - 33)  PCWP: --  SVR: --  SVRI: --  PVR: --  PVRI: --  CAPILLARY BLOOD GLUCOSE      POCT Blood Glucose.: 209 mg/dL (10 Oct 2019 22:01)  POCT Blood Glucose.: 237 mg/dL (10 Oct 2019 16:41)  POCT Blood Glucose.: 258 mg/dL (10 Oct 2019 12:19)    CAPILLARY BLOOD GLUCOSE      POCT Blood Glucose.: 209 mg/dL (10 Oct 2019 22:01)    I&O's Summary    10 Oct 2019 07:01  -  11 Oct 2019 07:00  --------------------------------------------------------  IN: 1099 mL / OUT: 2205 mL / NET: -1106 mL      Daily     Daily Weight in k.4 (11 Oct 2019 05:00)    PHYSICAL EXAMINATION:  General: ill-appearing elderly woman lying in bed on room air, appears comfortable  HEENT: PERRLA, EOMI, moist mucous membranes  Respiratory: clear bilaterally   CV: RRR, S1S2, no murmurs, rubs or gallops  Abdominal: Soft, nontender, nondistended  Extremities: no edema, + peripheral pulses  Skin: right femoral balloon pump in place, appears clean/dry/intact, good pulses   Tubes: +Oscar     LABS:  ABG - ( 10 Oct 2019 06:01 )  pH, Arterial: 7.46  pH, Blood: x     /  pCO2: 34    /  pO2: 80    / HCO3: 24    / Base Excess: 1.0   /  SaO2: 95                                      7.8    11.74 )-----------( 226      ( 11 Oct 2019 04:59 )             25.6     10-11    140  |  103  |  108<H>  ----------------------------<  151<H>  4.4   |  21<L>  |  3.17<H>    Ca    9.2      11 Oct 2019 05:00  Phos  4.6     10-11  Mg     2.6     10-11    TPro  7.3  /  Alb  3.4  /  TBili  0.6  /  DBili  x   /  AST  682<H>  /  ALT  1395<H>  /  AlkPhos  135<H>  1011    LIVER FUNCTIONS - ( 11 Oct 2019 05:00 )  Alb: 3.4 g/dL / Pro: 7.3 g/dL / ALK PHOS: 135 U/L / ALT: 1395 U/L / AST: 682 U/L / GGT: x           PT/INR - ( 10 Oct 2019 06:08 )   PT: 16.3 sec;   INR: 1.41 ratio         PTT - ( 11 Oct 2019 01:43 )  PTT:69.0 sec            TELEMETRY:     EKG:     IMAGING: PATIENT:  SELVIN CLAIRE  [id number]    CHIEF COMPLAINT:  Patient is a 73y old  Female who presents with a chief complaint of Acute decompensated heart failure, SOB (10 Oct 2019 23:21)      INTERVAL HISTORY/OVERNIGHT EVENTS: Yesterday pt had two episodes of SVT. One episode resolved with lidocaine and adenosine. Second episode resolved with adenosine. No acute events overnight. Pt seen and examined at bedside. States she feels unwell but states she has no pain or shortness of breath.     MEDICATIONS:  MEDICATIONS  (STANDING):  aspirin enteric coated 81 milliGRAM(s) Oral daily  cefepime   IVPB 1000 milliGRAM(s) IV Intermittent every 24 hours  chlorhexidine 2% Cloths 1 Application(s) Topical <User Schedule>  clopidogrel Tablet 75 milliGRAM(s) Oral daily  dextrose 5%. 1000 milliLiter(s) (50 mL/Hr) IV Continuous <Continuous>  dextrose 50% Injectable 12.5 Gram(s) IV Push once  dextrose 50% Injectable 25 Gram(s) IV Push once  dextrose 50% Injectable 25 Gram(s) IV Push once  docusate sodium 100 milliGRAM(s) Oral two times a day  heparin  Infusion.  Unit(s)/Hr (6.5 mL/Hr) IV Continuous <Continuous>  insulin glargine Injectable (LANTUS) 30 Unit(s) SubCutaneous at bedtime  insulin lispro (HumaLOG) corrective regimen sliding scale   SubCutaneous at bedtime  insulin lispro (HumaLOG) corrective regimen sliding scale   SubCutaneous three times a day before meals  insulin lispro Injectable (HumaLOG) 8 Unit(s) SubCutaneous three times a day before meals  levothyroxine 50 MICROGram(s) Oral daily  melatonin 5 milliGRAM(s) Oral at bedtime  metoprolol tartrate 12.5 milliGRAM(s) Oral two times a day  pantoprazole    Tablet 40 milliGRAM(s) Oral before breakfast  polyethylene glycol 3350 17 Gram(s) Oral daily  senna 2 Tablet(s) Oral at bedtime    MEDICATIONS  (PRN):  ALPRAZolam 0.25 milliGRAM(s) Oral three times a day PRN Anxiety  dextrose 40% Gel 15 Gram(s) Oral once PRN Blood Glucose LESS THAN 70 milliGRAM(s)/deciliter  glucagon  Injectable 1 milliGRAM(s) IntraMuscular once PRN Glucose LESS THAN 70 milligrams/deciliter  heparin  Injectable 3200 Unit(s) IV Push every 6 hours PRN For aPTT less than 40      ALLERGIES:  Allergies    azithromycin (Hives; Pruritus)    Intolerances        OBJECTIVE:  ICU Vital Signs Last 24 Hrs  T(C): 36.7 (11 Oct 2019 04:00), Max: 36.7 (11 Oct 2019 04:00)  T(F): 98.1 (11 Oct 2019 04:00), Max: 98.1 (11 Oct 2019 04:00)  HR: 66 (11 Oct 2019 07:00) (62 - 152)  BP: --  BP(mean): --  ABP: 152/18 (11 Oct 2019 07:00) (108/40 - 170/30)  ABP(mean): 76 (11 Oct 2019 07:00) (68 - 92)  RR: 19 (11 Oct 2019 07:00) (12 - 22)  SpO2: 100% (11 Oct 2019 07:00) (95% - 100%)      Adult Advanced Hemodynamics Last 24 Hrs  CVP(mm Hg): 4 (11 Oct 2019 07:00) (1 - 8)  CVP(cm H2O): --  CO: --  CI: --  PA: 28/13 (11 Oct 2019 07:00) (22/17 - 54/21)  PA(mean): 18 (11 Oct 2019 07:00) (15 - 33)  PCWP: --  SVR: --  SVRI: --  PVR: --  PVRI: --  CAPILLARY BLOOD GLUCOSE      POCT Blood Glucose.: 209 mg/dL (10 Oct 2019 22:01)  POCT Blood Glucose.: 237 mg/dL (10 Oct 2019 16:41)  POCT Blood Glucose.: 258 mg/dL (10 Oct 2019 12:19)    CAPILLARY BLOOD GLUCOSE      POCT Blood Glucose.: 209 mg/dL (10 Oct 2019 22:01)    I&O's Summary    10 Oct 2019 07:01  -  11 Oct 2019 07:00  --------------------------------------------------------  IN: 1099 mL / OUT: 2205 mL / NET: -1106 mL      Daily     Daily Weight in k.4 (11 Oct 2019 05:00)    PHYSICAL EXAMINATION:  General: ill-appearing elderly woman lying in bed on room air, appears comfortable  HEENT: PERRLA, EOMI, moist mucous membranes  Respiratory: clear bilaterally   CV: RRR, S1S2, no murmurs, rubs or gallops  Abdominal: Soft, nontender, nondistended  Extremities: no edema, + peripheral pulses  Skin: right femoral balloon pump in place, appears clean/dry/intact, good pulses   Tubes: +Oscar     LABS:  ABG - ( 10 Oct 2019 06:01 )  pH, Arterial: 7.46  pH, Blood: x     /  pCO2: 34    /  pO2: 80    / HCO3: 24    / Base Excess: 1.0   /  SaO2: 95                                      7.8    11.74 )-----------( 226      ( 11 Oct 2019 04:59 )             25.6     10-11    140  |  103  |  108<H>  ----------------------------<  151<H>  4.4   |  21<L>  |  3.17<H>    Ca    9.2      11 Oct 2019 05:00  Phos  4.6     10-11  Mg     2.6     10-11    TPro  7.3  /  Alb  3.4  /  TBili  0.6  /  DBili  x   /  AST  682<H>  /  ALT  1395<H>  /  AlkPhos  135<H>  10-11    LIVER FUNCTIONS - ( 11 Oct 2019 05:00 )  Alb: 3.4 g/dL / Pro: 7.3 g/dL / ALK PHOS: 135 U/L / ALT: 1395 U/L / AST: 682 U/L / GGT: x           PT/INR - ( 10 Oct 2019 06:08 )   PT: 16.3 sec;   INR: 1.41 ratio         PTT - ( 11 Oct 2019 01:43 )  PTT:69.0 sec              EKG: NSR, RBBB. QTc 406    IMAGING:  < from: Xray Chest 1 View- PORTABLE-Routine (10.11.19 @ 09:22) >    INTERPRETATION:  Clinical information: Line placement.    Single AP radiograph of the chest was obtained and compared to previous   study of 10/10/2019.    Heart size cannot be adequately assessed on this exam. Patient is status   post median sternotomy. Microclip is noted in place. [place]-Angi catheter   via the inferior vena cava approach is noted with tip in the left lower   lobe pulmonary artery. Intra-aortic balloon pump is unchanged in its   location when compared to previous exam. Lungs are clear. Visualized   osseous structures are within normal limits.    Impression: [place]-Angi catheter with tip in the left lower lobe pulmonary   artery.    < end of copied text >

## 2019-10-11 NOTE — PROGRESS NOTE ADULT - SUBJECTIVE AND OBJECTIVE BOX
[facility] [place] [facility]   MD Vanita Dasilva D.O. Fatima Sheikh, D.O. Ruoro Wong, PA    From 7 AM - 5 PM:  OFFICE: [phone]  Dr. Muhammad cell: [phone]  Dr. Bowers cell: [phone]  Dr. Soria cell: [phone]  RASHIDA Smith cell: [phone]    From 5 PM - 7 AM: Answering Service: [phone]        RENAL FOLLOW UP NOTE  --------------------------------------------------------------------------------  HPI: Pt seen and examined. States she feels ok but is very tired. Denies any SOB, cough, nausea.    Had 2 episodes of SVT this AM. Was taken off Bumex drip today.         PAST HISTORY  --------------------------------------------------------------------------------  No significant changes to PMH, PSH, FHx, SHx, unless otherwise noted    ALLERGIES & MEDICATIONS  --------------------------------------------------------------------------------  Allergies    azithromycin (Hives; Pruritus)    Intolerances      Standing Inpatient Medications  aspirin enteric coated 81 milliGRAM(s) Oral daily  cefepime   IVPB 1000 milliGRAM(s) IV Intermittent every 24 hours  chlorhexidine 2% Cloths 1 Application(s) Topical <User Schedule>  clopidogrel Tablet 75 milliGRAM(s) Oral daily  dextrose 5%. 1000 milliLiter(s) IV Continuous <Continuous>  dextrose 50% Injectable 12.5 Gram(s) IV Push once  dextrose 50% Injectable 25 Gram(s) IV Push once  dextrose 50% Injectable 25 Gram(s) IV Push once  docusate sodium 100 milliGRAM(s) Oral two times a day  heparin  Infusion.  Unit(s)/Hr IV Continuous <Continuous>  insulin glargine Injectable (LANTUS) 30 Unit(s) SubCutaneous at bedtime  insulin lispro (HumaLOG) corrective regimen sliding scale   SubCutaneous at bedtime  insulin lispro (HumaLOG) corrective regimen sliding scale   SubCutaneous three times a day before meals  insulin lispro Injectable (HumaLOG) 8 Unit(s) SubCutaneous three times a day before meals  levothyroxine 50 MICROGram(s) Oral daily  melatonin 5 milliGRAM(s) Oral at bedtime  metoprolol tartrate 12.5 milliGRAM(s) Oral two times a day  pantoprazole    Tablet 40 milliGRAM(s) Oral before breakfast  polyethylene glycol 3350 17 Gram(s) Oral daily  senna 2 Tablet(s) Oral at bedtime    PRN Inpatient Medications  ALPRAZolam 0.25 milliGRAM(s) Oral three times a day PRN  dextrose 40% Gel 15 Gram(s) Oral once PRN  glucagon  Injectable 1 milliGRAM(s) IntraMuscular once PRN  heparin  Injectable 3200 Unit(s) IV Push every 6 hours PRN      REVIEW OF SYSTEMS  --------------------------------------------------------------------------------  General: no fever  CVS: no chest pain  RESP: no sob, no cough  ABD: no abdominal pain  : no dysuria,  MSK: no edema     VITALS/PHYSICAL EXAM  --------------------------------------------------------------------------------  T(C): 36.7 (10-11-19 @ 08:00), Max: 37 (10-11-19 @ 07:00)  HR: 64 (10-11-19 @ 12:00) (62 - 152)  BP: --  RR: 19 (10-11-19 @ 12:00) (12 - 22)  SpO2: 99% (10-11-19 @ 12:00) (95% - 100%)  Wt(kg): --        10-10-19 @ 07:01  -  10-11-19 @ 07:00  --------------------------------------------------------  IN: 1099 mL / OUT: 2205 mL / NET: -1106 mL    10-11-19 @ 07:01  -  10-11-19 @ 12:34  --------------------------------------------------------  IN: 181.5 mL / OUT: 230 mL / NET: -48.5 mL      Physical Exam:  	Gen: NAD  	HEENT: MMM  	Pulm: CTA B/L  	CV: S1S2, IABP in place  	Abd: Soft, +BS  	Ext: No LE edema B/L              Neuro: Awake   	Skin: Warm and Dry   	    LABS/STUDIES  --------------------------------------------------------------------------------              7.8    11.74 >-----------<  226      [10-11-19 @ 04:59]              25.6     140  |  103  |  108  ----------------------------<  151      [10-11-19 @ 05:00]  4.4   |  21  |  3.17        Ca     9.2     [10-11-19 @ 05:00]      Mg     2.6     [10-11-19 @ 05:00]      Phos  4.6     [10-11-19 @ 05:00]    TPro  7.3  /  Alb  3.4  /  TBili  0.6  /  DBili  x   /  AST  682  /  ALT  1395  /  AlkPhos  135  [10-11-19 @ 05:00]    PT/INR: PT 16.3 , INR 1.41       [10-10-19 @ 06:08]  PTT: 81.0       [10-11-19 @ 09:55]      Creatinine Trend:  SCr 3.17 [10-11 @ 05:00]  SCr 3.21 [10-10 @ 18:27]  SCr 3.01 [10-10 @ 06:08]  SCr 3.05 [10-09 @ 23:12]  SCr 2.90 [10-09 @ 05:00]    Urinalysis - [10-08-19 @ 02:35]      Color Yellow / Appearance Clear / SG 1.016 / pH 5.5      Gluc Trace / Ketone Negative  / Bili Negative / Urobili Negative       Blood Negative / Protein 30 mg/dL / Leuk Est Negative / Nitrite Negative      RBC 1 / WBC 0 / Hyaline 6 / Gran 3-5 / Sq Epi  / Non Sq Epi 2 / Bacteria Negative    Urine Creatinine 20      [10-08-19 @ 20:53]  Urine Protein 20      [10-08-19 @ 23:13]  Urine Sodium 67      [10-08-19 @ 20:53]  Urine Osmolality 318      [10-08-19 @ 21:27]    Iron 42, TIBC 348, %sat 12      [07-05-19 @ 22:32]  Ferritin 130      [07-05-19 @ 22:32]  .4 (Ca --)      [04-25-19 @ 05:45]   --  Vitamin D (25OH) 25.9      [05-01-19 @ 04:00]  HbA1c 7.5      [10-08-19 @ 14:30]  TSH 1.01      [10-08-19 @ 14:47]  Lipid: chol 83, , HDL 14, LDL 46      [10-08-19 @ 14:48]

## 2019-10-11 NOTE — PROGRESS NOTE ADULT - SUBJECTIVE AND OBJECTIVE BOX
Chief complaint  Patient is a 73y old  Female who presents with a chief complaint of Acute decompensated heart failure, SOB (11 Oct 2019 07:32)   Review of systems  Patient in bed, looks comfortable, no fever, no hypoglycemia.    Labs and Fingersticks  CAPILLARY BLOOD GLUCOSE      POCT Blood Glucose.: 209 mg/dL (10 Oct 2019 22:01)  POCT Blood Glucose.: 237 mg/dL (10 Oct 2019 16:41)  POCT Blood Glucose.: 258 mg/dL (10 Oct 2019 12:19)      Anion Gap, Serum: 16 (10-11 @ 05:00)  Anion Gap, Serum: 18 <H> (10-10 @ 18:27)  Anion Gap, Serum: 16 (10-10 @ 06:08)  Anion Gap, Serum: 19 <H> (10-09 @ 23:12)      Calcium, Total Serum: 9.2 (10-11 @ 05:00)  Calcium, Total Serum: 9.0 (10-10 @ 18:27)  Calcium, Total Serum: 9.1 (10-10 @ 06:08)  Calcium, Total Serum: 9.0 (10-09 @ 23:12)  Albumin, Serum: 3.4 (10-11 @ 05:00)  Albumin, Serum: 3.2 <L> (10-10 @ 06:08)  Albumin, Serum: 3.2 <L> (10-09 @ 23:12)    Alanine Aminotransferase (ALT/SGPT): 1395 <H> (10-11 @ 05:00)  Alanine Aminotransferase (ALT/SGPT): 1803 <H> (10-10 @ 06:08)  Alanine Aminotransferase (ALT/SGPT): 1853 <H> (10-09 @ 23:12)  Alkaline Phosphatase, Serum: 135 <H> (10-11 @ 05:00)  Alkaline Phosphatase, Serum: 141 <H> (10-10 @ 06:08)  Alkaline Phosphatase, Serum: 138 <H> (10-09 @ 23:12)  Aspartate Aminotransferase (AST/SGOT): 682 <H> (10-11 @ 05:00)  Aspartate Aminotransferase (AST/SGOT): 1457 <H> (10-10 @ 06:08)  Aspartate Aminotransferase (AST/SGOT): 1676 <H> (10-09 @ 23:12)        10-11    140  |  103  |  108<H>  ----------------------------<  151<H>  4.4   |  21<L>  |  3.17<H>    Ca    9.2      11 Oct 2019 05:00  Phos  4.6     10-11  Mg     2.6     10-11    TPro  7.3  /  Alb  3.4  /  TBili  0.6  /  DBili  x   /  AST  682<H>  /  ALT  1395<H>  /  AlkPhos  135<H>  10-11                        7.8    11.74 )-----------( 226      ( 11 Oct 2019 04:59 )             25.6     Medications  MEDICATIONS  (STANDING):  aspirin enteric coated 81 milliGRAM(s) Oral daily  cefepime   IVPB 1000 milliGRAM(s) IV Intermittent every 24 hours  chlorhexidine 2% Cloths 1 Application(s) Topical <User Schedule>  clopidogrel Tablet 75 milliGRAM(s) Oral daily  dextrose 5%. 1000 milliLiter(s) (50 mL/Hr) IV Continuous <Continuous>  dextrose 50% Injectable 12.5 Gram(s) IV Push once  dextrose 50% Injectable 25 Gram(s) IV Push once  dextrose 50% Injectable 25 Gram(s) IV Push once  docusate sodium 100 milliGRAM(s) Oral two times a day  heparin  Infusion.  Unit(s)/Hr (6.5 mL/Hr) IV Continuous <Continuous>  insulin glargine Injectable (LANTUS) 30 Unit(s) SubCutaneous at bedtime  insulin lispro (HumaLOG) corrective regimen sliding scale   SubCutaneous at bedtime  insulin lispro (HumaLOG) corrective regimen sliding scale   SubCutaneous three times a day before meals  insulin lispro Injectable (HumaLOG) 8 Unit(s) SubCutaneous three times a day before meals  levothyroxine 50 MICROGram(s) Oral daily  melatonin 5 milliGRAM(s) Oral at bedtime  metoprolol tartrate 12.5 milliGRAM(s) Oral two times a day  pantoprazole    Tablet 40 milliGRAM(s) Oral before breakfast  polyethylene glycol 3350 17 Gram(s) Oral daily  senna 2 Tablet(s) Oral at bedtime      Physical Exam  General: Patient comfortable in bed  Vital Signs Last 12 Hrs  T(F): 98.1 (10-11-19 @ 08:00), Max: 98.6 (10-11-19 @ 07:00)  HR: 70 (10-11-19 @ 10:00) (62 - 70)  BP: --  BP(mean): --  RR: 19 (10-11-19 @ 10:00) (12 - 19)  SpO2: 100% (10-11-19 @ 10:00) (100% - 100%)  Neck: No palpable thyroid nodules.  CVS: S1S2, No murmurs  Respiratory: No wheezing, no crepitations  GI: Abdomen soft, bowel sounds positive  Musculoskeletal:  edema lower extremities.   Skin: No skin rashes, no ecchymosis    Diagnostics Chief complaint  Patient is a 73y old  Female who presents with a chief complaint of Acute decompensated heart failure, SOB (11 Oct 2019 07:32)   Review of systems  Patient in bed, looks comfortable, no fever,  no hypoglycemia.    Labs and Fingersticks  CAPILLARY BLOOD GLUCOSE      POCT Blood Glucose.: 209 mg/dL (10 Oct 2019 22:01)  POCT Blood Glucose.: 237 mg/dL (10 Oct 2019 16:41)  POCT Blood Glucose.: 258 mg/dL (10 Oct 2019 12:19)      Anion Gap, Serum: 16 (10-11 @ 05:00)  Anion Gap, Serum: 18 <H> (10-10 @ 18:27)  Anion Gap, Serum: 16 (10-10 @ 06:08)  Anion Gap, Serum: 19 <H> (10-09 @ 23:12)      Calcium, Total Serum: 9.2 (10-11 @ 05:00)  Calcium, Total Serum: 9.0 (10-10 @ 18:27)  Calcium, Total Serum: 9.1 (10-10 @ 06:08)  Calcium, Total Serum: 9.0 (10-09 @ 23:12)  Albumin, Serum: 3.4 (10-11 @ 05:00)  Albumin, Serum: 3.2 <L> (10-10 @ 06:08)  Albumin, Serum: 3.2 <L> (10-09 @ 23:12)    Alanine Aminotransferase (ALT/SGPT): 1395 <H> (10-11 @ 05:00)  Alanine Aminotransferase (ALT/SGPT): 1803 <H> (10-10 @ 06:08)  Alanine Aminotransferase (ALT/SGPT): 1853 <H> (10-09 @ 23:12)  Alkaline Phosphatase, Serum: 135 <H> (10-11 @ 05:00)  Alkaline Phosphatase, Serum: 141 <H> (10-10 @ 06:08)  Alkaline Phosphatase, Serum: 138 <H> (10-09 @ 23:12)  Aspartate Aminotransferase (AST/SGOT): 682 <H> (10-11 @ 05:00)  Aspartate Aminotransferase (AST/SGOT): 1457 <H> (10-10 @ 06:08)  Aspartate Aminotransferase (AST/SGOT): 1676 <H> (10-09 @ 23:12)        10-11    140  |  103  |  108<H>  ----------------------------<  151<H>  4.4   |  21<L>  |  3.17<H>    Ca    9.2      11 Oct 2019 05:00  Phos  4.6     10-11  Mg     2.6     10-11    TPro  7.3  /  Alb  3.4  /  TBili  0.6  /  DBili  x   /  AST  682<H>  /  ALT  1395<H>  /  AlkPhos  135<H>  10-11                        7.8    11.74 )-----------( 226      ( 11 Oct 2019 04:59 )             25.6     Medications  MEDICATIONS  (STANDING):  aspirin enteric coated 81 milliGRAM(s) Oral daily  cefepime   IVPB 1000 milliGRAM(s) IV Intermittent every 24 hours  chlorhexidine 2% Cloths 1 Application(s) Topical <User Schedule>  clopidogrel Tablet 75 milliGRAM(s) Oral daily  dextrose 5%. 1000 milliLiter(s) (50 mL/Hr) IV Continuous <Continuous>  dextrose 50% Injectable 12.5 Gram(s) IV Push once  dextrose 50% Injectable 25 Gram(s) IV Push once  dextrose 50% Injectable 25 Gram(s) IV Push once  docusate sodium 100 milliGRAM(s) Oral two times a day  heparin  Infusion.  Unit(s)/Hr (6.5 mL/Hr) IV Continuous <Continuous>  insulin glargine Injectable (LANTUS) 30 Unit(s) SubCutaneous at bedtime  insulin lispro (HumaLOG) corrective regimen sliding scale   SubCutaneous at bedtime  insulin lispro (HumaLOG) corrective regimen sliding scale   SubCutaneous three times a day before meals  insulin lispro Injectable (HumaLOG) 8 Unit(s) SubCutaneous three times a day before meals  levothyroxine 50 MICROGram(s) Oral daily  melatonin 5 milliGRAM(s) Oral at bedtime  metoprolol tartrate 12.5 milliGRAM(s) Oral two times a day  pantoprazole    Tablet 40 milliGRAM(s) Oral before breakfast  polyethylene glycol 3350 17 Gram(s) Oral daily  senna 2 Tablet(s) Oral at bedtime      Physical Exam  General: Patient comfortable in bed  Vital Signs Last 12 Hrs  T(F): 98.1 (10-11-19 @ 08:00), Max: 98.6 (10-11-19 @ 07:00)  HR: 70 (10-11-19 @ 10:00) (62 - 70)  BP: --  BP(mean): --  RR: 19 (10-11-19 @ 10:00) (12 - 19)  SpO2: 100% (10-11-19 @ 10:00) (100% - 100%)  Neck: No palpable thyroid nodules.  CVS: S1S2, No murmurs  Respiratory: No wheezing, no crepitations  GI: Abdomen soft, bowel sounds positive  Musculoskeletal:  edema lower extremities.   Skin: No skin rashes, no ecchymosis    Diagnostics

## 2019-10-11 NOTE — PROGRESS NOTE ADULT - ASSESSMENT
74 yo F w/ PMH of CKD stage 4 (baseline Cr 1.9-2.2) HTN, T2DM, CAD s/p CABG X3 (2014 at [facility]), non-dilated ICM (EF 20-25%), severe mitral regurgitation s/p mitral clip (6/13/19), severe pulm HTN, hypothyroidism who presented for evaluation of chest pain, cough, and SOB for the past 2 days and was admitted for ADHF. s/p IABP 10/8/19. Nephrology is following for MERVIN    MERVIN on CKD stage 4  - baseline Cr 1.9-2.2; has had recurrent MERVIN's over the years  - CKD is likely 2/2 recurrent MERVIN's + underlying DM/HTN  - MERVIN is likely 2/2 CRS     - admitted w/ cr 3; now 3.17  - Bumex drip D/C'ed; can monitor off diuretic for now     - renal sonogram w/ parenchymal changes, no hydro, no stones, left simple renal cyst  - Avoid nephrotoxins including NSAIDs, IV contrast (if possible), Fleet's products  - maintain MAP >65  - monitor I/O's accurately  - made 2.2 L urine in the past 24 hrs    - vanco trough 30.2 yesterday  - repeat vanco trough  - redose vanco if trough <15     10/10/19: Had a long conversation w/ pt's daughter, Ms. Elsa Negrete [phone], regarding renal function and need for cardiac cath. She is agreeable w/ whatever plan the cardiology/medical team decides on as she is not sure what the best option is for her mother. At this time, I explained the possibility that pt may need dialysis even if she does not have cardiac cath given severely decreased renal and cardiac function. I explained that she is at a higher risk of needing dialysis If cardiac cath is performed (especially if performed emergently w/ current MERVIN). Ms. Negrete is concerned that her mother is weak and cannot withstand the catheterization and dialysis but re-iterated to do whatever the cardiology/medical team thinks is best. I informed her that currently, there is no emergent need for cardiac cath and that the cardiology team is awaiting renal improvement prior to cardiac cath (per resident, as of this morning) and that I agree w/ this decision, however, should cardiac catheterization become urgent/emergent, that I would agree w/ catheterization and close follow up of renal function post-cath.    - according to Sandip et. al. NGHIA risk calculator, pt has a 57.3% risk of NGHIA post-cath and a 12.6% risk of needing dialysis post-cath (if 100 mL contrast is used and given her current parameters)    Metabolic acidosis  - 2/2 lactic acidosis 2/2 hypoperfusion/hypotension/dec cardiac output  - lactic acidosis resolved w/ improved perfusion on IABP  - monitor    Hypotension  - likely from dec cardiac output and hypoperfusion  - hold anti-hypertensives (hydralazine, imdur, spironolactone)  - beta blocker per cardio recs  - improved w/ IABP    Proteinuria/Hematuria  - likely from underlying DM  - Hep C neg in 2017  - check spot protein/Cr, Hep B (sAg, cAb, sAb, eAg), HIV, syphilis, SPEP, UPEP, serum immunofixation, ANCA, C3 and C4 complement levels, DEAN, rheumatoid factor, cryoglobulin 74 yo F w/ PMH of CKD stage 4 (baseline Cr 1.9-2.2) HTN, T2DM, CAD s/p CABG X3 (2014 at [facility]), non-dilated ICM (EF 20-25%), severe mitral regurgitation s/p mitral clip (6/13/19), severe pulm HTN, hypothyroidism who presented for evaluation of chest pain, cough, and SOB for the past 2 days and was admitted for ADHF. s/p IABP 10/8/19. Nephrology is following for MERVIN    MERVIN on CKD stage 4  - baseline Cr 1.9-2.2; has had recurrent MERVIN's over the years  - CKD is likely 2/2 recurrent MERVIN's + underlying DM/HTN  - MERVIN is likely 2/2 CRS     - admitted w/ cr 3; now 3.17  - Bumex drip D/C'ed; can monitor off diuretic for now     - renal sonogram w/ parenchymal changes, no hydro, no stones, left simple renal cyst  - Avoid nephrotoxins including NSAIDs, IV contrast (if possible), Fleet's products  - maintain MAP >65  - monitor I/O's accurately  - made 2.2 L urine in the past 24 hrs    - vanco trough 30.2 yesterday  - repeat vanco trough  - redose vanco if trough <15     10/10/19: Had a long conversation w/ pt's daughter, Ms. Elsa Nergete [phone], regarding renal function and need for cardiac cath. She is agreeable w/ whatever plan the cardiology/medical team decides on as she is not sure what the best option is for her mother. At this time, I explained the possibility that pt may need dialysis even if she does not have cardiac cath given severely decreased renal and cardiac function. I explained that she is at a higher risk of needing dialysis If cardiac cath is performed (especially if performed emergently w/ current MERVIN). Ms. Negrete is concerned that her mother is weak and cannot withstand the catheterization and dialysis but re-iterated to do whatever the cardiology/medical team thinks is best. I informed her that currently, there is no emergent need for cardiac cath and that the cardiology team is awaiting renal improvement prior to cardiac cath (per resident, as of this morning) and that I agree w/ this decision, however, should cardiac catheterization become urgent/emergent, that I would agree w/ catheterization and close follow up of renal function post-cath.    - according to Sandip et. al. NGHIA risk calculator, pt has a 57.3% risk of NGHIA post-cath and a 12.6% risk of needing dialysis post-cath (if 100 mL contrast is used and given her current parameters)    - no indication for renal replacement therapy today    Metabolic acidosis  - 2/2 lactic acidosis 2/2 hypoperfusion/hypotension/dec cardiac output  - lactic acidosis resolved w/ improved perfusion on IABP  - monitor    Hypotension  - likely from dec cardiac output and hypoperfusion  - hold anti-hypertensives (hydralazine, imdur, spironolactone)  - beta blocker per cardio recs  - improved w/ IABP    Proteinuria/Hematuria  - likely from underlying DM  - Hep C neg in 2017  - check spot urine protein/Cr, Hep B (sAg, cAb, sAb, eAg), HIV, syphilis, SPEP, UPEP, serum immunofixation, ANCA, C3 and C4 complement levels, DEAN, rheumatoid factor, cryoglobulin

## 2019-10-11 NOTE — CHART NOTE - NSCHARTNOTEFT_GEN_A_CORE
Nutrition Follow Up Note  Patient seen for: Malnutrition follow up     Source: RN, medical record,     chart reviewed, events noted: "74 yo F with HTN, HLD, DM2, CAD s/p CABG (LIMA to LAD, SVG to OM, SVG to PDA), severe mitral regurgitation s/p mitral clip, severe pulmonary HTN, CKD IV, hypothyroidism presented with two days of SOB". Pt noted with pulmonary edema, s/p diuresis. Taken to cath lab, placed on IABP. Nephrology following 2/2 MERVIN on CKD stage 4, metabolic acidosis, hypotension, and proteinuria/hematuria.     Diet : Consistent CHO, Renal, DASH     Patient reports: Pt seen, lethargic. Pt with limited participation in interview at this time. Per RN, Pt refuses  phone. Per RN, Pt ate fair at lunch today and is accepting the mighty shakes. RN aware that RD remains available to monitor PO intake and help to provide food preferences.      PO intake: fair      Source for PO intake: RN    GI: No distress     Daily Weight in k.4 (10-11), Weight in k.9 (10-10)  % Weight Change    Pertinent Medications: MEDICATIONS  (STANDING):  aspirin enteric coated 81 milliGRAM(s) Oral daily  cefepime   IVPB 1000 milliGRAM(s) IV Intermittent every 24 hours  chlorhexidine 2% Cloths 1 Application(s) Topical <User Schedule>  clopidogrel Tablet 75 milliGRAM(s) Oral daily  dextrose 5%. 1000 milliLiter(s) (50 mL/Hr) IV Continuous <Continuous>  dextrose 50% Injectable 12.5 Gram(s) IV Push once  dextrose 50% Injectable 25 Gram(s) IV Push once  dextrose 50% Injectable 25 Gram(s) IV Push once  docusate sodium 100 milliGRAM(s) Oral two times a day  heparin  Infusion.  Unit(s)/Hr (6.5 mL/Hr) IV Continuous <Continuous>  insulin glargine Injectable (LANTUS) 30 Unit(s) SubCutaneous at bedtime  insulin lispro (HumaLOG) corrective regimen sliding scale   SubCutaneous at bedtime  insulin lispro (HumaLOG) corrective regimen sliding scale   SubCutaneous three times a day before meals  insulin lispro Injectable (HumaLOG) 8 Unit(s) SubCutaneous three times a day before meals  levothyroxine 50 MICROGram(s) Oral daily  melatonin 5 milliGRAM(s) Oral at bedtime  metoprolol tartrate 12.5 milliGRAM(s) Oral two times a day  pantoprazole    Tablet 40 milliGRAM(s) Oral before breakfast  polyethylene glycol 3350 17 Gram(s) Oral daily  senna 2 Tablet(s) Oral at bedtime    MEDICATIONS  (PRN):  ALPRAZolam 0.25 milliGRAM(s) Oral three times a day PRN Anxiety  dextrose 40% Gel 15 Gram(s) Oral once PRN Blood Glucose LESS THAN 70 milliGRAM(s)/deciliter  glucagon  Injectable 1 milliGRAM(s) IntraMuscular once PRN Glucose LESS THAN 70 milligrams/deciliter  heparin  Injectable 3200 Unit(s) IV Push every 6 hours PRN For aPTT less than 40    Pertinent Labs: 10-11 @ 05:00: Na 140, <H>, Cr 3.17<H>, <H>, K+ 4.4, Phos 4.6<H>, Mg 2.6, Alk Phos 135<H>, ALT/SGPT 1395<H>, AST/SGOT 682<H>, HbA1c --  10-10 @ 18:27: Na 137, <H>, Cr 3.21<H>, <H>, K+ 3.4<L>, Phos 4.6<H>, Mg 2.5, Alk Phos --, ALT/SGPT --, AST/SGOT --, HbA1c --    Finger Sticks:  POCT Blood Glucose.: 304 mg/dL (10-11 @ 12:35)  POCT Blood Glucose.: 209 mg/dL (10-10 @ 22:01)  POCT Blood Glucose.: 237 mg/dL (10-10 @ 16:41)      Skin per nursing documentation: intact  Edema: No edema     Estimated Needs:   [X ] no change since previous assessment  [ ] recalculated:     Previous Nutrition Diagnosis: Malnutrition   Nutrition Diagnosis is: on going, care plan in place      Interventions:     Recommend  1. Continue Consistent CHO, renal diet  2. Continue mighty shakes x2 daily  3. Provide food preferences as requested by Pt/family within diet restrictions    4. Encourage PO intake during meal times   5. Reviewed menu ordering procedures   6. Trend BG levels and renal indices     Monitoring and Evaluation:     Continue to monitor Nutritional intake, Tolerance to diet prescription, weights, labs, skin integrity    RD remains available upon request and will follow up per protocol  Sarah Siegler RD, RDN, [facility] Pager #[phone]

## 2019-10-11 NOTE — CHART NOTE - NSCHARTNOTEFT_GEN_A_CORE
Removal of Femoral Sheath of Halma-Angi Catheter    The patient was placed in the supine position. The insertion site was identified and the sutures were removed per protocol.  The 8 [place] femoral sheath was then removed. Direct pressure was applied for 8 minutes.  No hematoma was noted post-sheath removal.    Monitoring of the right groin and both lower extremities including neuro-vascular checks and vital signs every 15 minutes x 4, then every 30 minutes x 2, then every 1 hour was ordered.    Complications: None/Other     Comments: No hematoma

## 2019-10-11 NOTE — PROGRESS NOTE ADULT - PROBLEM SELECTOR PLAN 1
Will continue current insulin regimen for now. Will continue monitoring FS, log, and FU.  Patient counseled for compliance with consistent low carb diet. Patient counseled for compliance with consistent low carb diet.

## 2019-10-12 LAB
ALBUMIN SERPL ELPH-MCNC: 3.1 G/DL — LOW (ref 3.3–5)
ALBUMIN SERPL ELPH-MCNC: 3.4 G/DL — SIGNIFICANT CHANGE UP (ref 3.3–5)
ALP SERPL-CCNC: 120 U/L — SIGNIFICANT CHANGE UP (ref 40–120)
ALP SERPL-CCNC: 135 U/L — HIGH (ref 40–120)
ALT FLD-CCNC: 785 U/L — HIGH (ref 10–45)
ALT FLD-CCNC: 957 U/L — HIGH (ref 10–45)
ANION GAP SERPL CALC-SCNC: 17 MMOL/L — SIGNIFICANT CHANGE UP (ref 5–17)
ANION GAP SERPL CALC-SCNC: 18 MMOL/L — HIGH (ref 5–17)
AST SERPL-CCNC: 245 U/L — HIGH (ref 10–40)
AST SERPL-CCNC: 314 U/L — HIGH (ref 10–40)
BILIRUB SERPL-MCNC: 0.5 MG/DL — SIGNIFICANT CHANGE UP (ref 0.2–1.2)
BILIRUB SERPL-MCNC: 0.5 MG/DL — SIGNIFICANT CHANGE UP (ref 0.2–1.2)
BLD GP AB SCN SERPL QL: NEGATIVE — SIGNIFICANT CHANGE UP
BUN SERPL-MCNC: 106 MG/DL — HIGH (ref 7–23)
BUN SERPL-MCNC: 116 MG/DL — HIGH (ref 7–23)
CALCIUM SERPL-MCNC: 9.4 MG/DL — SIGNIFICANT CHANGE UP (ref 8.4–10.5)
CALCIUM SERPL-MCNC: 9.6 MG/DL — SIGNIFICANT CHANGE UP (ref 8.4–10.5)
CHLORIDE SERPL-SCNC: 98 MMOL/L — SIGNIFICANT CHANGE UP (ref 96–108)
CHLORIDE SERPL-SCNC: 99 MMOL/L — SIGNIFICANT CHANGE UP (ref 96–108)
CO2 SERPL-SCNC: 19 MMOL/L — LOW (ref 22–31)
CO2 SERPL-SCNC: 21 MMOL/L — LOW (ref 22–31)
CREAT SERPL-MCNC: 2.96 MG/DL — HIGH (ref 0.5–1.3)
CREAT SERPL-MCNC: 2.99 MG/DL — HIGH (ref 0.5–1.3)
GAS PNL BLDA: SIGNIFICANT CHANGE UP
GLUCOSE SERPL-MCNC: 323 MG/DL — HIGH (ref 70–99)
GLUCOSE SERPL-MCNC: 387 MG/DL — HIGH (ref 70–99)
HCT VFR BLD CALC: 23.5 % — LOW (ref 34.5–45)
HCT VFR BLD CALC: 24.6 % — LOW (ref 34.5–45)
HCT VFR BLD CALC: 29.5 % — LOW (ref 34.5–45)
HGB BLD-MCNC: 7.3 G/DL — LOW (ref 11.5–15.5)
HGB BLD-MCNC: 7.5 G/DL — LOW (ref 11.5–15.5)
HGB BLD-MCNC: 9.5 G/DL — LOW (ref 11.5–15.5)
LACTATE SERPL-SCNC: 1.8 MMOL/L — SIGNIFICANT CHANGE UP (ref 0.7–2)
MAGNESIUM SERPL-MCNC: 2.4 MG/DL — SIGNIFICANT CHANGE UP (ref 1.6–2.6)
MAGNESIUM SERPL-MCNC: 2.6 MG/DL — SIGNIFICANT CHANGE UP (ref 1.6–2.6)
MCHC RBC-ENTMCNC: 25.6 PG — LOW (ref 27–34)
MCHC RBC-ENTMCNC: 26.1 PG — LOW (ref 27–34)
MCHC RBC-ENTMCNC: 27.3 PG — SIGNIFICANT CHANGE UP (ref 27–34)
MCHC RBC-ENTMCNC: 30.5 GM/DL — LOW (ref 32–36)
MCHC RBC-ENTMCNC: 31.1 GM/DL — LOW (ref 32–36)
MCHC RBC-ENTMCNC: 32.2 GM/DL — SIGNIFICANT CHANGE UP (ref 32–36)
MCV RBC AUTO: 83.9 FL — SIGNIFICANT CHANGE UP (ref 80–100)
MCV RBC AUTO: 84 FL — SIGNIFICANT CHANGE UP (ref 80–100)
MCV RBC AUTO: 84.8 FL — SIGNIFICANT CHANGE UP (ref 80–100)
NRBC # BLD: 0 /100 WBCS — SIGNIFICANT CHANGE UP (ref 0–0)
PHOSPHATE SERPL-MCNC: 5.3 MG/DL — HIGH (ref 2.5–4.5)
PHOSPHATE SERPL-MCNC: 5.6 MG/DL — HIGH (ref 2.5–4.5)
PLATELET # BLD AUTO: 182 K/UL — SIGNIFICANT CHANGE UP (ref 150–400)
PLATELET # BLD AUTO: 184 K/UL — SIGNIFICANT CHANGE UP (ref 150–400)
PLATELET # BLD AUTO: 184 K/UL — SIGNIFICANT CHANGE UP (ref 150–400)
POTASSIUM SERPL-MCNC: 3.9 MMOL/L — SIGNIFICANT CHANGE UP (ref 3.5–5.3)
POTASSIUM SERPL-MCNC: 4.2 MMOL/L — SIGNIFICANT CHANGE UP (ref 3.5–5.3)
POTASSIUM SERPL-SCNC: 3.9 MMOL/L — SIGNIFICANT CHANGE UP (ref 3.5–5.3)
POTASSIUM SERPL-SCNC: 4.2 MMOL/L — SIGNIFICANT CHANGE UP (ref 3.5–5.3)
PROT SERPL-MCNC: 6.8 G/DL — SIGNIFICANT CHANGE UP (ref 6–8.3)
PROT SERPL-MCNC: 7.2 G/DL — SIGNIFICANT CHANGE UP (ref 6–8.3)
RBC # BLD: 2.8 M/UL — LOW (ref 3.8–5.2)
RBC # BLD: 2.93 M/UL — LOW (ref 3.8–5.2)
RBC # BLD: 3.48 M/UL — LOW (ref 3.8–5.2)
RBC # BLD: 3.53 M/UL — LOW (ref 3.8–5.2)
RBC # FLD: 17.1 % — HIGH (ref 10.3–14.5)
RBC # FLD: 18.2 % — HIGH (ref 10.3–14.5)
RBC # FLD: 18.3 % — HIGH (ref 10.3–14.5)
RETICS #: 120.7 K/UL — SIGNIFICANT CHANGE UP (ref 25–125)
RETICS/RBC NFR: 3.4 % — HIGH (ref 0.5–2.5)
RH IG SCN BLD-IMP: POSITIVE — SIGNIFICANT CHANGE UP
SODIUM SERPL-SCNC: 135 MMOL/L — SIGNIFICANT CHANGE UP (ref 135–145)
SODIUM SERPL-SCNC: 137 MMOL/L — SIGNIFICANT CHANGE UP (ref 135–145)
WBC # BLD: 10.02 K/UL — SIGNIFICANT CHANGE UP (ref 3.8–10.5)
WBC # BLD: 12.89 K/UL — HIGH (ref 3.8–10.5)
WBC # BLD: 9.48 K/UL — SIGNIFICANT CHANGE UP (ref 3.8–10.5)
WBC # FLD AUTO: 10.02 K/UL — SIGNIFICANT CHANGE UP (ref 3.8–10.5)
WBC # FLD AUTO: 12.89 K/UL — HIGH (ref 3.8–10.5)
WBC # FLD AUTO: 9.48 K/UL — SIGNIFICANT CHANGE UP (ref 3.8–10.5)

## 2019-10-12 PROCEDURE — 71045 X-RAY EXAM CHEST 1 VIEW: CPT | Mod: 26

## 2019-10-12 PROCEDURE — 99291 CRITICAL CARE FIRST HOUR: CPT

## 2019-10-12 RX ORDER — METOPROLOL TARTRATE 50 MG
5 TABLET ORAL ONCE
Refills: 0 | Status: COMPLETED | OUTPATIENT
Start: 2019-10-12 | End: 2019-10-12

## 2019-10-12 RX ORDER — HEPARIN SODIUM 5000 [USP'U]/ML
5000 INJECTION INTRAVENOUS; SUBCUTANEOUS EVERY 12 HOURS
Refills: 0 | Status: DISCONTINUED | OUTPATIENT
Start: 2019-10-12 | End: 2019-10-18

## 2019-10-12 RX ORDER — INSULIN LISPRO 100/ML
10 VIAL (ML) SUBCUTANEOUS
Refills: 0 | Status: DISCONTINUED | OUTPATIENT
Start: 2019-10-12 | End: 2019-10-12

## 2019-10-12 RX ORDER — METOPROLOL TARTRATE 50 MG
25 TABLET ORAL
Refills: 0 | Status: DISCONTINUED | OUTPATIENT
Start: 2019-10-12 | End: 2019-10-12

## 2019-10-12 RX ORDER — INSULIN GLARGINE 100 [IU]/ML
36 INJECTION, SOLUTION SUBCUTANEOUS AT BEDTIME
Refills: 0 | Status: DISCONTINUED | OUTPATIENT
Start: 2019-10-12 | End: 2019-10-12

## 2019-10-12 RX ORDER — SODIUM CHLORIDE 9 MG/ML
250 INJECTION INTRAMUSCULAR; INTRAVENOUS; SUBCUTANEOUS ONCE
Refills: 0 | Status: COMPLETED | OUTPATIENT
Start: 2019-10-12 | End: 2019-10-12

## 2019-10-12 RX ORDER — INSULIN HUMAN 100 [IU]/ML
4 INJECTION, SOLUTION SUBCUTANEOUS
Qty: 100 | Refills: 0 | Status: DISCONTINUED | OUTPATIENT
Start: 2019-10-12 | End: 2019-10-13

## 2019-10-12 RX ORDER — INSULIN HUMAN 100 [IU]/ML
4 INJECTION, SOLUTION SUBCUTANEOUS ONCE
Refills: 0 | Status: COMPLETED | OUTPATIENT
Start: 2019-10-12 | End: 2019-10-12

## 2019-10-12 RX ORDER — VASOPRESSIN 20 [USP'U]/ML
0.02 INJECTION INTRAVENOUS
Qty: 50 | Refills: 0 | Status: DISCONTINUED | OUTPATIENT
Start: 2019-10-12 | End: 2019-10-14

## 2019-10-12 RX ORDER — ADENOSINE 3 MG/ML
6 INJECTION INTRAVENOUS ONCE
Refills: 0 | Status: COMPLETED | OUTPATIENT
Start: 2019-10-12 | End: 2019-10-11

## 2019-10-12 RX ADMIN — Medication 25 MILLIGRAM(S): at 05:01

## 2019-10-12 RX ADMIN — INSULIN HUMAN 4 UNIT(S): 100 INJECTION, SOLUTION SUBCUTANEOUS at 17:57

## 2019-10-12 RX ADMIN — Medication 100 MILLIGRAM(S): at 17:41

## 2019-10-12 RX ADMIN — SENNA PLUS 2 TABLET(S): 8.6 TABLET ORAL at 22:39

## 2019-10-12 RX ADMIN — Medication 8: at 08:31

## 2019-10-12 RX ADMIN — HEPARIN SODIUM 5000 UNIT(S): 5000 INJECTION INTRAVENOUS; SUBCUTANEOUS at 05:01

## 2019-10-12 RX ADMIN — Medication 100 MILLIGRAM(S): at 05:01

## 2019-10-12 RX ADMIN — Medication 5 MILLIGRAM(S): at 22:39

## 2019-10-12 RX ADMIN — VASOPRESSIN 1.2 UNIT(S)/MIN: 20 INJECTION INTRAVENOUS at 20:12

## 2019-10-12 RX ADMIN — VASOPRESSIN 2.4 UNIT(S)/MIN: 20 INJECTION INTRAVENOUS at 10:14

## 2019-10-12 RX ADMIN — SODIUM CHLORIDE 250 MILLILITER(S): 9 INJECTION INTRAMUSCULAR; INTRAVENOUS; SUBCUTANEOUS at 08:32

## 2019-10-12 RX ADMIN — Medication 5 MILLIGRAM(S): at 23:30

## 2019-10-12 RX ADMIN — CEFEPIME 100 MILLIGRAM(S): 1 INJECTION, POWDER, FOR SOLUTION INTRAMUSCULAR; INTRAVENOUS at 09:44

## 2019-10-12 RX ADMIN — Medication 10: at 12:43

## 2019-10-12 RX ADMIN — CHLORHEXIDINE GLUCONATE 1 APPLICATION(S): 213 SOLUTION TOPICAL at 05:02

## 2019-10-12 RX ADMIN — Medication 81 MILLIGRAM(S): at 12:44

## 2019-10-12 RX ADMIN — POLYETHYLENE GLYCOL 3350 17 GRAM(S): 17 POWDER, FOR SOLUTION ORAL at 12:44

## 2019-10-12 RX ADMIN — CLOPIDOGREL BISULFATE 75 MILLIGRAM(S): 75 TABLET, FILM COATED ORAL at 12:44

## 2019-10-12 RX ADMIN — Medication 1 ENEMA: at 22:39

## 2019-10-12 RX ADMIN — Medication 10 UNIT(S): at 12:43

## 2019-10-12 RX ADMIN — HEPARIN SODIUM 5000 UNIT(S): 5000 INJECTION INTRAVENOUS; SUBCUTANEOUS at 17:41

## 2019-10-12 RX ADMIN — PANTOPRAZOLE SODIUM 40 MILLIGRAM(S): 20 TABLET, DELAYED RELEASE ORAL at 05:01

## 2019-10-12 RX ADMIN — INSULIN HUMAN 4 UNIT(S)/HR: 100 INJECTION, SOLUTION SUBCUTANEOUS at 17:56

## 2019-10-12 RX ADMIN — Medication 50 MICROGRAM(S): at 05:01

## 2019-10-12 RX ADMIN — Medication 10 UNIT(S): at 08:31

## 2019-10-12 NOTE — PROGRESS NOTE ADULT - SUBJECTIVE AND OBJECTIVE BOX
Chief complaint  Patient is a 73y old  Female who presents with a chief complaint of Acute decompensated heart failure, SOB (12 Oct 2019 17:40)   Review of systems  Patient in bed, looks comfortable, no fever, no hypoglycemia.    Labs and Fingersticks  CAPILLARY BLOOD GLUCOSE      POCT Blood Glucose.: 442 mg/dL (12 Oct 2019 19:03)  POCT Blood Glucose.: 445 mg/dL (12 Oct 2019 17:09)  POCT Blood Glucose.: 331 mg/dL (12 Oct 2019 08:02)  POCT Blood Glucose.: 324 mg/dL (11 Oct 2019 21:32)      Anion Gap, Serum: 18 <H> (10-12 @ 11:16)  Anion Gap, Serum: 17 (10-12 @ 03:57)  Anion Gap, Serum: 16 (10-11 @ 05:00)      Calcium, Total Serum: 9.6 (10-12 @ 11:16)  Calcium, Total Serum: 9.4 (10-12 @ 03:57)  Calcium, Total Serum: 9.2 (10-11 @ 05:00)  Albumin, Serum: 3.1 <L> (10-12 @ 11:16)  Albumin, Serum: 3.4 (10-12 @ 03:57)  Albumin, Serum: 3.4 (10-11 @ 05:00)    Alanine Aminotransferase (ALT/SGPT): 785 <H> (10-12 @ 11:16)  Alanine Aminotransferase (ALT/SGPT): 957 <H> (10-12 @ 03:57)  Alanine Aminotransferase (ALT/SGPT): 1395 <H> (10-11 @ 05:00)  Alkaline Phosphatase, Serum: 120 (10-12 @ 11:16)  Alkaline Phosphatase, Serum: 135 <H> (10-12 @ 03:57)  Alkaline Phosphatase, Serum: 135 <H> (10-11 @ 05:00)  Aspartate Aminotransferase (AST/SGOT): 245 <H> (10-12 @ 11:16)  Aspartate Aminotransferase (AST/SGOT): 314 <H> (10-12 @ 03:57)  Aspartate Aminotransferase (AST/SGOT): 682 <H> (10-11 @ 05:00)        10-12    135  |  98  |  116<H>  ----------------------------<  387<H>  4.2   |  19<L>  |  2.96<H>    Ca    9.6      12 Oct 2019 11:16  Phos  5.3     10-12  Mg     2.4     10-12    TPro  6.8  /  Alb  3.1<L>  /  TBili  0.5  /  DBili  x   /  AST  245<H>  /  ALT  785<H>  /  AlkPhos  120  10-12                        7.3    12.89 )-----------( 184      ( 12 Oct 2019 11:16 )             23.5     Medications  MEDICATIONS  (STANDING):  aspirin enteric coated 81 milliGRAM(s) Oral daily  cefepime   IVPB 1000 milliGRAM(s) IV Intermittent every 24 hours  chlorhexidine 2% Cloths 1 Application(s) Topical <User Schedule>  clopidogrel Tablet 75 milliGRAM(s) Oral daily  dextrose 5%. 1000 milliLiter(s) (50 mL/Hr) IV Continuous <Continuous>  dextrose 50% Injectable 12.5 Gram(s) IV Push once  dextrose 50% Injectable 25 Gram(s) IV Push once  dextrose 50% Injectable 25 Gram(s) IV Push once  docusate sodium 100 milliGRAM(s) Oral two times a day  heparin  Injectable 5000 Unit(s) SubCutaneous every 12 hours  insulin regular Infusion 4 Unit(s)/Hr (4 mL/Hr) IV Continuous <Continuous>  levothyroxine 50 MICROGram(s) Oral daily  melatonin 5 milliGRAM(s) Oral at bedtime  pantoprazole    Tablet 40 milliGRAM(s) Oral before breakfast  polyethylene glycol 3350 17 Gram(s) Oral daily  senna 2 Tablet(s) Oral at bedtime  vasopressin Infusion 0.04 Unit(s)/Min (2.4 mL/Hr) IV Continuous <Continuous>      Physical Exam  General: Patient comfortable in bed  Vital Signs Last 12 Hrs  T(F): 98 (10-12-19 @ 16:00), Max: 98.4 (10-12-19 @ 08:00)  HR: 70 (10-12-19 @ 19:00) (66 - 74)  BP: 86/45 (10-12-19 @ 08:00) (86/45 - 86/45)  BP(mean): 58 (10-12-19 @ 08:00) (58 - 58)  RR: 20 (10-12-19 @ 19:00) (9 - 28)  SpO2: 100% (10-12-19 @ 19:00) (92% - 100%)  Neck: No palpable thyroid nodules.  CVS: S1S2, No murmurs  Respiratory: No wheezing, no crepitations  GI: Abdomen soft, bowel sounds positive  Musculoskeletal:  edema lower extremities.   Skin: No skin rashes, no ecchymosis    Diagnostics

## 2019-10-12 NOTE — PROGRESS NOTE ADULT - ASSESSMENT
72 y/o F w/ PMH of HTN, HLD, DM2, GERD, CAD s/p CABG (LIMA to LAD, SVG to OM, SVG to PDA; 2014), severe MR s/p MitraClip, severe pHTN, CKD4, hypoTH c/o dyspnea x 2 days suspect 2/2  NSTEMI +/- ADHF w/ e/o organ hypoperfusion.    #CV  Vessels - Elev troponin suspect possible NSTEMI. C/w ASA/clopidogrel. C/w hep gtt. C/w vol optimize. S/p IABP to improve coronary perfusion. unlikely to undergo LHC in light of poor renal function, will cw medical mgmt for now.  Pump - Severe reduced LVEF. Bumetanide gtt d/c'ed. Oscar in place. Strict Is/Os. PA Cath in place; serial perfusion labs.  Valves - S/p MitraClip. No active issues.  Rhythm - C/w monitor.    #Neuro - No active issues  #Resp - Off supplemental O2, sating well on RA. Vol optimize, as above.  #GI - Heart healthy diet. Trend liver tests, downtrending   #Endo - Trend BGFS on Lantus and Humalog. endo recs appreciated/ C/w home med Synthroid.   #Renal - Vol optimize, as above. MERVIN likely 2/2 acute congestion. continue to monitor creatinine.    #ID - Leukocytosis. Sepsis w/u unremarkable thus far; c/w Cefepime given MERVIN, to complete 7 day course on 10/14  #Hem - Full dose heparin, as above.  #Ethics - FC. C/w care in CCU 74 y/o F w/ PMH of HTN, HLD, DM2, GERD, CAD s/p CABG (LIMA to LAD, SVG to OM, SVG to PDA; 2014), severe MR s/p MitraClip, severe pHTN, CKD4, hypoTH c/o dyspnea x 2 days suspect 2/2  NSTEMI +/- ADHF w/ e/o organ hypoperfusion.    #CV  Vessels - Elev troponin suspect possible NSTEMI. C/w ASA/clopidogrel. C/w hep gtt. C/w vol optimize. S/p IABP to improve coronary perfusion. unlikely to undergo LHC in light of poor renal function, will cw medical mgmt for now.  Pump - Severe reduced LVEF. s/p Bumetanide gtt   Valves - S/p MitraClip. No active issues.  Rhythm - C/w monitor.    #Neuro - No active issues  #Resp - Off supplemental O2, sating well on RA. Vol optimize, as above.  #GI - Heart healthy diet. Trend liver tests, downtrending   #Endo - Trend BGFS on Lantus and Humalog. endo recs appreciated/ C/w home med Synthroid.   #Renal - Vol optimize, as above. MERVIN likely 2/2 acute congestion. continue to monitor creatinine.    #ID - Leukocytosis. Sepsis w/u unremarkable thus far; c/w Cefepime given MERVIN, to complete 7 day course on 10/14  #Hem - Full dose heparin, as above.  #Ethics - FC. 72 y/o F w/ PMH of HTN, HLD, DM2, GERD, CAD s/p CABG (LIMA to LAD, SVG to OM, SVG to PDA; 2014), severe MR s/p MitraClip, severe pHTN, CKD4, hypoTH c/o dyspnea x 2 days suspect 2/2  NSTEMI +/- ADHF w/ e/o organ hypoperfusion.    #CV  Vessels - Elev troponin suspect possible NSTEMI. C/w ASA/clopidogrel. C/w hep gtt. C/w vol optimize. S/p IABP to improve coronary perfusion. unlikely to undergo LHC in light of poor renal function, will cw medical mgmt for now. hypotension after increased metoprolol dose now on vasopressin. on exam perfusing well, low concern for cardiogenic shock. low concern for sepsis as cultures negative and pt on IV abx. will monitor and attempt to wean.   Pump - Severe reduced LVEF. s/p Bumetanide gtt   Valves - S/p MitraClip. No active issues.  Rhythm - C/w monitor.    #Neuro - No active issues  #Resp - Off supplemental O2, sating well on RA. Vol optimize, as above.  #GI - Heart healthy diet. Trend liver tests, downtrending   #Endo - Trend BGFS on Lantus and Humalog. endo recs appreciated/ C/w home med Synthroid.   #Renal - Vol optimize, as above. MERVIN likely 2/2 acute congestion. continue to monitor creatinine.    #ID - Leukocytosis. Sepsis w/u unremarkable thus far- BCx and UCx NGTD; c/w Cefepime given MERVIN, to complete 7 day course on 10/14  #Hem - Full dose heparin, as above.  #Ethics - FC. 72 y/o F w/ PMH of HTN, HLD, DM2, GERD, CAD s/p CABG (LIMA to LAD, SVG to OM, SVG to PDA; 2014), severe MR s/p MitraClip, severe pHTN, CKD4, hypoTH c/o dyspnea x 2 days suspect 2/2  NSTEMI +/- ADHF w/ e/o organ hypoperfusion.    #CV  Vessels - Elev troponin suspect possible NSTEMI. C/w ASA/clopidogrel. S/P hep gtt. C/w vol optimize. S/p IABP to improve coronary perfusion. unlikely to undergo LHC in light of poor renal function, will cw medical mgmt for now. hypotension after increased metoprolol dose now on vasopressin. on exam perfusing well, lactate unchanged-low concern for cardiogenic shock. low concern for sepsis as cultures negative and pt on IV abx. will monitor. ordering echo. AM cortisol for tomorrow. will order 1 unit PRBCS.    Pump - Severe reduced LVEF. s/p Bumetanide gtt   Valves - S/p MitraClip. No active issues.  Rhythm - C/w monitor.    #Neuro - No active issues  #Resp - Off supplemental O2, sating well on RA. Vol optimize, as above.  #GI - Heart healthy diet. Trend liver tests, downtrending   #Endo - Trend BGFS on Lantus and Humalog. endo recs appreciated/ C/w home med Synthroid.   #Renal - Vol optimize, as above. MERVIN likely 2/2 acute congestion. continue to monitor creatinine.    #ID - Leukocytosis. Sepsis w/u unremarkable thus far- BCx and UCx NGTD; c/w Cefepime given MERVIN, to complete 7 day course on 10/14  #Hem - HSQ for DVT PPX. iron studies. will transfuse 1 unit PRBCS   #Ethics - FC.

## 2019-10-12 NOTE — PROGRESS NOTE ADULT - ASSESSMENT
Assessment  DMT2: 73y Female with DM T2 with hyperglycemia, A1C 7.5%, on basal bolus insulin, dose was increased today, FS > 400, switched to IV insulin now, hypoglycemic episode, eating meals.  SOB: on meds, FU Heart Failure team.  CAD: S/P CABG previous admission, on medications, no chest pain, stable, monitored.  Hypothyroidism: On Synthroid 50 mcg po daily, compliant with Synthroid intake, asymptomatic.  HTN: Controlled,  on antihypertensive medications.  CKD: Monitor labs/BMP,           Jillian Banks MD  Cell: [phone]  Office: [phone]

## 2019-10-12 NOTE — PROGRESS NOTE ADULT - ASSESSMENT
72 yo F w/ PMH of CKD stage 4 (baseline Cr 1.9-2.2) HTN, T2DM, CAD s/p CABG X3 (2014 at [facility]), non-dilated ICM (EF 20-25%), severe mitral regurgitation s/p mitral clip (6/13/19), severe pulm HTN, hypothyroidism who presented for evaluation of chest pain, cough, and SOB for the past 2 days and was admitted for ADHF. s/p IABP 10/8/19. Nephrology is following for MERVIN    MERVIN on CKD stage 4  - baseline Cr 1.9-2.2; has had recurrent MERVIN's over the years  - CKD is likely 2/2 recurrent MERVIN's + underlying DM/HTN  - MERVIN is likely 2/2 CRS     - admitted w/ cr 3; has been relatively stable, may have a new baseline  - Bumex drip D/C'ed; can monitor off diuretic for now     - renal sonogram w/ parenchymal changes, no hydro, no stones, left simple renal cyst  - Avoid nephrotoxins including NSAIDs, IV contrast (if possible), [employer] products  - maintain MAP >65  - monitor I/O's accurately  - made 2.2 L urine in the past 24 hrs    - vanco trough 30.2 10/09, currently off vanco     10/10/19: Had a long conversation w/ pt's daughter, Ms. Elsa Negrete [phone], regarding renal function and need for cardiac cath. She is agreeable w/ whatever plan the cardiology/medical team decides on as she is not sure what the best option is for her mother. At this time, I explained the possibility that pt may need dialysis even if she does not have cardiac cath given severely decreased renal and cardiac function. I explained that she is at a higher risk of needing dialysis If cardiac cath is performed (especially if performed emergently w/ current MERVIN). Ms. Negrete is concerned that her mother is weak and cannot withstand the catheterization and dialysis but re-iterated to do whatever the cardiology/medical team thinks is best. I informed her that currently, there is no emergent need for cardiac cath and that the cardiology team is awaiting renal improvement prior to cardiac cath (per resident, as of this morning) and that I agree w/ this decision, however, should cardiac catheterization become urgent/emergent, that I would agree w/ catheterization and close follow up of renal function post-cath.    - according to Sandip et. al. NGHIA risk calculator, pt has a 57.3% risk of NGHIA post-cath and a 12.6% risk of needing dialysis post-cath (if 100 mL contrast is used and given her current parameters)    - no indication for renal replacement therapy today    Metabolic acidosis  - 2/2 lactic acidosis 2/2 hypoperfusion/hypotension/dec cardiac output  - lactic acidosis resolved w/ improved perfusion on IABP  - monitor    Hypotension  - likely from dec cardiac output and hypoperfusion  - hold anti-hypertensives (hydralazine, imdur, spironolactone)  - beta blocker per cardio recs  - on vasopressure    Proteinuria/Hematuria  - likely from underlying DM  - Hep C neg in 2017  - check spot urine protein/Cr, Hep B (sAg, cAb, sAb, eAg), HIV, syphilis, SPEP, UPEP, serum immunofixation, ANCA, C3 and C4 complement levels, DEAN, rheumatoid factor, cryoglobulin

## 2019-10-12 NOTE — PROGRESS NOTE ADULT - SUBJECTIVE AND OBJECTIVE BOX
PATIENT:  SELVIN CLAIRE  [id number]    CHIEF COMPLAINT:  Patient is a 73y old  Female who presents with a chief complaint of Acute decompensated heart failure, SOB (11 Oct 2019 21:47)      INTERVAL HISTORY/OVERNIGHT EVENTS:      REVIEW OF SYSTEMS:    Constitutional:     [ ] negative [ ] fevers [ ] chills [ ] weight loss [ ] weight gain  HEENT:                  [ ] negative [ ] dry eyes [ ] eye irritation [ ] postnasal drip [ ] nasal congestion  CV:                         [ ] negative  [ ] chest pain [ ] orthopnea [ ] palpitations [ ] murmur  Resp:                     [ ] negative [ ] cough [ ] shortness of breath [ ] dyspnea [ ] wheezing [ ] sputum [ ] hemoptysis  GI:                          [ ] negative [ ] nausea [ ] vomiting [ ] diarrhea [ ] constipation [ ] abd pain [ ] dysphagia   :                        [ ] negative [ ] dysuria [ ] nocturia [ ] hematuria [ ] increased urinary frequency  Musculoskeletal: [ ] negative [ ] back pain [ ] myalgias [ ] arthralgias [ ] fracture  Skin:                       [ ] negative [ ] rash [ ] itch  Neurological:        [ ] negative [ ] headache [ ] dizziness [ ] syncope [ ] weakness [ ] numbness  Psychiatric:           [ ] negative [ ] anxiety [ ] depression  Endocrine:            [ ] negative [ ] diabetes [ ] thyroid problem  Heme/Lymph:      [ ] negative [ ] anemia [ ] bleeding problem  Allergic/Immune: [ ] negative [ ] itchy eyes [ ] nasal discharge [ ] hives [ ] angioedema    [ ] All other systems negative  [ ] Unable to assess ROS because ________.    MEDICATIONS:  MEDICATIONS  (STANDING):  aspirin enteric coated 81 milliGRAM(s) Oral daily  cefepime   IVPB 1000 milliGRAM(s) IV Intermittent every 24 hours  chlorhexidine 2% Cloths 1 Application(s) Topical <User Schedule>  clopidogrel Tablet 75 milliGRAM(s) Oral daily  dextrose 5%. 1000 milliLiter(s) (50 mL/Hr) IV Continuous <Continuous>  dextrose 50% Injectable 12.5 Gram(s) IV Push once  dextrose 50% Injectable 25 Gram(s) IV Push once  dextrose 50% Injectable 25 Gram(s) IV Push once  docusate sodium 100 milliGRAM(s) Oral two times a day  heparin  Injectable 5000 Unit(s) SubCutaneous every 12 hours  insulin glargine Injectable (LANTUS) 36 Unit(s) SubCutaneous at bedtime  insulin lispro (HumaLOG) corrective regimen sliding scale   SubCutaneous at bedtime  insulin lispro (HumaLOG) corrective regimen sliding scale   SubCutaneous three times a day before meals  insulin lispro Injectable (HumaLOG) 10 Unit(s) SubCutaneous three times a day before meals  levothyroxine 50 MICROGram(s) Oral daily  melatonin 5 milliGRAM(s) Oral at bedtime  metoprolol tartrate 25 milliGRAM(s) Oral two times a day  pantoprazole    Tablet 40 milliGRAM(s) Oral before breakfast  polyethylene glycol 3350 17 Gram(s) Oral daily  senna 2 Tablet(s) Oral at bedtime    MEDICATIONS  (PRN):  ALPRAZolam 0.25 milliGRAM(s) Oral three times a day PRN Anxiety  dextrose 40% Gel 15 Gram(s) Oral once PRN Blood Glucose LESS THAN 70 milliGRAM(s)/deciliter  glucagon  Injectable 1 milliGRAM(s) IntraMuscular once PRN Glucose LESS THAN 70 milligrams/deciliter      ALLERGIES:  Allergies    azithromycin (Hives; Pruritus)    Intolerances        OBJECTIVE:  ICU Vital Signs Last 24 Hrs  T(C): 37.1 (12 Oct 2019 05:00), Max: 37.1 (12 Oct 2019 05:00)  T(F): 98.7 (12 Oct 2019 05:00), Max: 98.7 (12 Oct 2019 05:00)  HR: 68 (12 Oct 2019 06:00) (64 - 148)  BP: --  BP(mean): --  ABP: 118/54 (12 Oct 2019 06:00) (72/38 - 163/20)  ABP(mean): 76 (12 Oct 2019 06:00) (50 - 76)  RR: 19 (12 Oct 2019 06:00) (15 - 34)  SpO2: 100% (12 Oct 2019 06:00) (97% - 100%)      Adult Advanced Hemodynamics Last 24 Hrs  CVP(mm Hg): 11 (11 Oct 2019 15:00) (4 - 11)  CVP(cm H2O): --  CO: --  CI: --  PA: 19/10 (11 Oct 2019 15:00) (19/10 - 38/14)  PA(mean): 15 (11 Oct 2019 15:00) (15 - 23)  PCWP: --  SVR: --  SVRI: --  PVR: --  PVRI: --  CAPILLARY BLOOD GLUCOSE      POCT Blood Glucose.: 324 mg/dL (11 Oct 2019 21:32)  POCT Blood Glucose.: 241 mg/dL (11 Oct 2019 17:40)  POCT Blood Glucose.: 304 mg/dL (11 Oct 2019 12:35)    CAPILLARY BLOOD GLUCOSE      POCT Blood Glucose.: 324 mg/dL (11 Oct 2019 21:32)    I&O's Summary    11 Oct 2019 07:01  -  12 Oct 2019 07:00  --------------------------------------------------------  IN: 1021.5 mL / OUT: 1950 mL / NET: -928.5 mL      Daily     Daily Weight in k.7 (12 Oct 2019 05:00)    PHYSICAL EXAMINATION:  General: ill-appearing elderly woman lying in bed on room air, appears comfortable  HEENT: PERRLA, EOMI, moist mucous membranes  Respiratory: clear bilaterally   CV: RRR, S1S2, no murmurs, rubs or gallops  Abdominal: Soft, nontender, nondistended  Extremities: no edema, + peripheral pulses  Tubes: +Oscar     LABS:  ABG - ( 11 Oct 2019 09:47 )  pH, Arterial: 7.44  pH, Blood: x     /  pCO2: 34    /  pO2: 104   / HCO3: 23    / Base Excess: -.7   /  SaO2: 98                                      7.5    10.02 )-----------( 184      ( 12 Oct 2019 03:57 )             24.6     10-12    137  |  99  |  106<H>  ----------------------------<  323<H>  3.9   |  21<L>  |  2.99<H>    Ca    9.4      12 Oct 2019 03:57  Phos  5.6     10-12  Mg     2.6     10-12    TPro  7.2  /  Alb  3.4  /  TBili  0.5  /  DBili  x   /  AST  314<H>  /  ALT  957<H>  /  AlkPhos  135<H>  10-12    LIVER FUNCTIONS - ( 12 Oct 2019 03:57 )  Alb: 3.4 g/dL / Pro: 7.2 g/dL / ALK PHOS: 135 U/L / ALT: 957 U/L / AST: 314 U/L / GGT: x           PTT - ( 11 Oct 2019 09:55 )  PTT:81.0 sec            TELEMETRY:     EKG:     IMAGING: PATIENT:  SELVIN CLAIRE  [id number]    CHIEF COMPLAINT:  Patient is a 73y old  Female who presents with a chief complaint of Acute decompensated heart failure, SOB (11 Oct 2019 21:47)      INTERVAL HISTORY/OVERNIGHT EVENTS: Overnight pt's BB was increased to 25. MAPs this AM were in 50s, pt started on vasopressin. Pt seen and examined at bedside. States she feels unwell, asked if she had pain and stated she did not have pain.     MEDICATIONS:  MEDICATIONS  (STANDING):  aspirin enteric coated 81 milliGRAM(s) Oral daily  cefepime   IVPB 1000 milliGRAM(s) IV Intermittent every 24 hours  chlorhexidine 2% Cloths 1 Application(s) Topical <User Schedule>  clopidogrel Tablet 75 milliGRAM(s) Oral daily  dextrose 5%. 1000 milliLiter(s) (50 mL/Hr) IV Continuous <Continuous>  dextrose 50% Injectable 12.5 Gram(s) IV Push once  dextrose 50% Injectable 25 Gram(s) IV Push once  dextrose 50% Injectable 25 Gram(s) IV Push once  docusate sodium 100 milliGRAM(s) Oral two times a day  heparin  Injectable 5000 Unit(s) SubCutaneous every 12 hours  insulin glargine Injectable (LANTUS) 36 Unit(s) SubCutaneous at bedtime  insulin lispro (HumaLOG) corrective regimen sliding scale   SubCutaneous at bedtime  insulin lispro (HumaLOG) corrective regimen sliding scale   SubCutaneous three times a day before meals  insulin lispro Injectable (HumaLOG) 10 Unit(s) SubCutaneous three times a day before meals  levothyroxine 50 MICROGram(s) Oral daily  melatonin 5 milliGRAM(s) Oral at bedtime  metoprolol tartrate 25 milliGRAM(s) Oral two times a day  pantoprazole    Tablet 40 milliGRAM(s) Oral before breakfast  polyethylene glycol 3350 17 Gram(s) Oral daily  senna 2 Tablet(s) Oral at bedtime    MEDICATIONS  (PRN):  ALPRAZolam 0.25 milliGRAM(s) Oral three times a day PRN Anxiety  dextrose 40% Gel 15 Gram(s) Oral once PRN Blood Glucose LESS THAN 70 milliGRAM(s)/deciliter  glucagon  Injectable 1 milliGRAM(s) IntraMuscular once PRN Glucose LESS THAN 70 milligrams/deciliter      ALLERGIES:  Allergies    azithromycin (Hives; Pruritus)    Intolerances        OBJECTIVE:  ICU Vital Signs Last 24 Hrs  T(C): 37.1 (12 Oct 2019 05:00), Max: 37.1 (12 Oct 2019 05:00)  T(F): 98.7 (12 Oct 2019 05:00), Max: 98.7 (12 Oct 2019 05:00)  HR: 68 (12 Oct 2019 06:00) (64 - 148)  BP: --  BP(mean): --  ABP: 118/54 (12 Oct 2019 06:00) (72/38 - 163/20)  ABP(mean): 76 (12 Oct 2019 06:00) (50 - 76)  RR: 19 (12 Oct 2019 06:00) (15 - 34)  SpO2: 100% (12 Oct 2019 06:00) (97% - 100%)      Adult Advanced Hemodynamics Last 24 Hrs  CVP(mm Hg): 11 (11 Oct 2019 15:00) (4 - 11)  CVP(cm H2O): --  CO: --  CI: --  PA: 19/10 (11 Oct 2019 15:00) (19/10 - 38/14)  PA(mean): 15 (11 Oct 2019 15:00) (15 - 23)  PCWP: --  SVR: --  SVRI: --  PVR: --  PVRI: --  CAPILLARY BLOOD GLUCOSE      POCT Blood Glucose.: 324 mg/dL (11 Oct 2019 21:32)  POCT Blood Glucose.: 241 mg/dL (11 Oct 2019 17:40)  POCT Blood Glucose.: 304 mg/dL (11 Oct 2019 12:35)    CAPILLARY BLOOD GLUCOSE      POCT Blood Glucose.: 324 mg/dL (11 Oct 2019 21:32)    I&O's Summary    11 Oct 2019 07:01  -  12 Oct 2019 07:00  --------------------------------------------------------  IN: 1021.5 mL / OUT: 1950 mL / NET: -928.5 mL      Daily     Daily Weight in k.7 (12 Oct 2019 05:00)    PHYSICAL EXAMINATION:  General: ill-appearing elderly woman lying in bed on room air, appears comfortable  HEENT: PERRLA, EOMI, moist mucous membranes  Respiratory: clear bilaterally   CV: RRR, S1S2, no murmurs, rubs or gallops  Abdominal: Soft, nontender, nondistended  Extremities: no edema, + peripheral pulses      LABS:  ABG - ( 11 Oct 2019 09:47 )  pH, Arterial: 7.44  pH, Blood: x     /  pCO2: 34    /  pO2: 104   / HCO3: 23    / Base Excess: -.7   /  SaO2: 98                                      7.5    10.02 )-----------( 184      ( 12 Oct 2019 03:57 )             24.6     10    137  |  99  |  106<H>  ----------------------------<  323<H>  3.9   |  21<L>  |  2.99<H>    Ca    9.4      12 Oct 2019 03:57  Phos  5.6     10  Mg     2.6     10-12    TPro  7.2  /  Alb  3.4  /  TBili  0.5  /  DBili  x   /  AST  314<H>  /  ALT  957<H>  /  AlkPhos  135<H>  1012    LIVER FUNCTIONS - ( 12 Oct 2019 03:57 )  Alb: 3.4 g/dL / Pro: 7.2 g/dL / ALK PHOS: 135 U/L / ALT: 957 U/L / AST: 314 U/L / GGT: x           PTT - ( 11 Oct 2019 09:55 )  PTT:81.0 sec            EKG:     IMAGING:  < from: Xray Chest 1 View- PORTABLE-Routine (10.12.19 @ 08:28) >      IMPRESSION:  Clear lungs post removal of IABP and [place]-Angi catheter.    < end of copied text > PATIENT:  SELVIN CLAIRE  [id number]    CHIEF COMPLAINT:  Patient is a 73y old  Female who presents with a chief complaint of Acute decompensated heart failure, SOB (11 Oct 2019 21:47)      INTERVAL HISTORY/OVERNIGHT EVENTS: Overnight pt's BB was increased to 25. MAPs this AM were in 50s, pt given 250 cc bolus of NS. started on vasopressin. Pt seen and examined at bedside. States she feels unwell, asked if she had pain and stated she did not have pain.     MEDICATIONS:  MEDICATIONS  (STANDING):  aspirin enteric coated 81 milliGRAM(s) Oral daily  cefepime   IVPB 1000 milliGRAM(s) IV Intermittent every 24 hours  chlorhexidine 2% Cloths 1 Application(s) Topical <User Schedule>  clopidogrel Tablet 75 milliGRAM(s) Oral daily  dextrose 5%. 1000 milliLiter(s) (50 mL/Hr) IV Continuous <Continuous>  dextrose 50% Injectable 12.5 Gram(s) IV Push once  dextrose 50% Injectable 25 Gram(s) IV Push once  dextrose 50% Injectable 25 Gram(s) IV Push once  docusate sodium 100 milliGRAM(s) Oral two times a day  heparin  Injectable 5000 Unit(s) SubCutaneous every 12 hours  insulin glargine Injectable (LANTUS) 36 Unit(s) SubCutaneous at bedtime  insulin lispro (HumaLOG) corrective regimen sliding scale   SubCutaneous at bedtime  insulin lispro (HumaLOG) corrective regimen sliding scale   SubCutaneous three times a day before meals  insulin lispro Injectable (HumaLOG) 10 Unit(s) SubCutaneous three times a day before meals  levothyroxine 50 MICROGram(s) Oral daily  melatonin 5 milliGRAM(s) Oral at bedtime  metoprolol tartrate 25 milliGRAM(s) Oral two times a day  pantoprazole    Tablet 40 milliGRAM(s) Oral before breakfast  polyethylene glycol 3350 17 Gram(s) Oral daily  senna 2 Tablet(s) Oral at bedtime    MEDICATIONS  (PRN):  ALPRAZolam 0.25 milliGRAM(s) Oral three times a day PRN Anxiety  dextrose 40% Gel 15 Gram(s) Oral once PRN Blood Glucose LESS THAN 70 milliGRAM(s)/deciliter  glucagon  Injectable 1 milliGRAM(s) IntraMuscular once PRN Glucose LESS THAN 70 milligrams/deciliter      ALLERGIES:  Allergies    azithromycin (Hives; Pruritus)    Intolerances        OBJECTIVE:  ICU Vital Signs Last 24 Hrs  T(C): 37.1 (12 Oct 2019 05:00), Max: 37.1 (12 Oct 2019 05:00)  T(F): 98.7 (12 Oct 2019 05:00), Max: 98.7 (12 Oct 2019 05:00)  HR: 68 (12 Oct 2019 06:00) (64 - 148)  BP: --  BP(mean): --  ABP: 118/54 (12 Oct 2019 06:00) (72/38 - 163/20)  ABP(mean): 76 (12 Oct 2019 06:00) (50 - 76)  RR: 19 (12 Oct 2019 06:00) (15 - 34)  SpO2: 100% (12 Oct 2019 06:00) (97% - 100%)      Adult Advanced Hemodynamics Last 24 Hrs  CVP(mm Hg): 11 (11 Oct 2019 15:00) (4 - 11)  CVP(cm H2O): --  CO: --  CI: --  PA: 19/10 (11 Oct 2019 15:00) (19/10 - 38/14)  PA(mean): 15 (11 Oct 2019 15:00) (15 - 23)  PCWP: --  SVR: --  SVRI: --  PVR: --  PVRI: --  CAPILLARY BLOOD GLUCOSE      POCT Blood Glucose.: 324 mg/dL (11 Oct 2019 21:32)  POCT Blood Glucose.: 241 mg/dL (11 Oct 2019 17:40)  POCT Blood Glucose.: 304 mg/dL (11 Oct 2019 12:35)    CAPILLARY BLOOD GLUCOSE      POCT Blood Glucose.: 324 mg/dL (11 Oct 2019 21:32)    I&O's Summary    11 Oct 2019 07:01  -  12 Oct 2019 07:00  --------------------------------------------------------  IN: 1021.5 mL / OUT: 1950 mL / NET: -928.5 mL      Daily     Daily Weight in k.7 (12 Oct 2019 05:00)    PHYSICAL EXAMINATION:  General: ill-appearing elderly woman lying in bed on room air, appears comfortable  HEENT: PERRLA, EOMI, moist mucous membranes  Respiratory: clear bilaterally   CV: RRR, S1S2, no murmurs, rubs or gallops  Abdominal: Soft, nontender, nondistended  Extremities: no edema, + peripheral pulses      LABS:  ABG - ( 11 Oct 2019 09:47 )  pH, Arterial: 7.44  pH, Blood: x     /  pCO2: 34    /  pO2: 104   / HCO3: 23    / Base Excess: -.7   /  SaO2: 98                                      7.5    10.02 )-----------( 184      ( 12 Oct 2019 03:57 )             24.6     10-12    137  |  99  |  106<H>  ----------------------------<  323<H>  3.9   |  21<L>  |  2.99<H>    Ca    9.4      12 Oct 2019 03:57  Phos  5.6     10-12  Mg     2.6     10-12    TPro  7.2  /  Alb  3.4  /  TBili  0.5  /  DBili  x   /  AST  314<H>  /  ALT  957<H>  /  AlkPhos  135<H>  10-12    LIVER FUNCTIONS - ( 12 Oct 2019 03:57 )  Alb: 3.4 g/dL / Pro: 7.2 g/dL / ALK PHOS: 135 U/L / ALT: 957 U/L / AST: 314 U/L / GGT: x           PTT - ( 11 Oct 2019 09:55 )  PTT:81.0 sec          IMAGING:  < from: Xray Chest 1 View- PORTABLE-Routine (10.12.19 @ 08:28) >      IMPRESSION:  Clear lungs post removal of IABP and [place]-Angi catheter.    < end of copied text >

## 2019-10-12 NOTE — PROGRESS NOTE ADULT - SUBJECTIVE AND OBJECTIVE BOX
====================  CCU MIDNIGHT ROUNDS  ====================    SELVIN CLAIRE  [id number]  Patient is a 73y old  Female who presents with a chief complaint of Acute decompensated heart failure, SOB (12 Oct 2019 19:14)    ====================  SUMMARY: 74 yo F w/ PMH of HTN, HLD, DM2, GERD, CAD s/p CABG (LIMA to LAD, SVG to OM, SVG to PDA; 2014), severe MR s/p MitraClip, severe pHTN, CKD4, hypoTH c/o dyspnea x 2D suspect 2/2 NSTEMI +/- ADHF w/ e/o organ hypoperfusion. s/p RHC/IABP without LHC due to MERVIN on CKD, now weaned off, with runs of SVT overnight that received adenosine/metoprolol, now persistently hypotensive.    ====================    ====================  NEW EVENTS: Patient seen and examined at bedside. Vital signs stable overnight. Remains on insulin gtt and vasopressin (plan to wean off)    ====================      ====================  Vital Signs (last 12 hrs):  ====================    T(C): 36.9 (10-12-19 @ 19:30), Max: 36.9 (10-12-19 @ 11:00)  T(F): 98.5 (10-12-19 @ 19:30), Max: 98.5 (10-12-19 @ 19:30)  HR: 70 (10-12-19 @ 20:15) (66 - 74)  BP: 105/57 (10-12-19 @ 19:45) (105/57 - 105/57)  BP(mean): 72 (10-12-19 @ 19:45) (72 - 72)  ABP: 106/46 (10-12-19 @ 20:15) (82/32 - 112/48)  ABP(mean): 68 (10-12-19 @ 20:15) (48 - 72)  RR: 16 (10-12-19 @ 20:15) (9 - 28)  SpO2: 100% (10-12-19 @ 20:15) (92% - 100%)  Wt(kg): --  CVP(mm Hg): --  CVP(cm H2O): --  CO: --  CI: --  PA: --  PA(mean): --  PCWP: --  SVR: --  PVR: --    I&O's Summary    11 Oct 2019 07:01  -  12 Oct 2019 07:00  --------------------------------------------------------  IN: 1021.5 mL / OUT: 1950 mL / NET: -928.5 mL    12 Oct 2019 07:01  -  12 Oct 2019 21:43  --------------------------------------------------------  IN: 835.2 mL / OUT: 700 mL / NET: 135.2 mL            ====================  NEW LABS:  ====================                        7.3    12.89 )-----------( 184      ( 12 Oct 2019 11:16 )             23.5     10-12    135  |  98  |  116<H>  ----------------------------<  387<H>  4.2   |  19<L>  |  2.96<H>    Ca    9.6      12 Oct 2019 11:16  Phos  5.3     10-12  Mg     2.4     10-12    TPro  6.8  /  Alb  3.1<L>  /  TBili  0.5  /  DBili  x   /  AST  245<H>  /  ALT  785<H>  /  AlkPhos  120  10-12    PTT - ( 11 Oct 2019 09:55 )  PTT:81.0 sec      ABG - ( 12 Oct 2019 11:07 )  pH, Arterial: 7.46  pH, Blood: x     /  pCO2: 33    /  pO2: 101   / HCO3: 23    / Base Excess: -.3   /  SaO2: 98                    ====================  Assessment & Plan:    #CV  Vessels - Elev troponin suspect possible NSTEMI. C/w ASA/clopidogrel. S/P hep gtt. C/w vol optimize. S/p IABP to improve coronary perfusion. unlikely to undergo LHC in light of poor renal function, will cw medical mgmt for now. Hypotension after increased metoprolol dose now on vasopressin. On exam perfusing well, lactate unchanged-low concern for cardiogenic shock. low concern for sepsis as cultures negative and pt on IV abx. AM cortisol for tomorrow. s/p 1 unit PRBCS.    Pump - Severe reduced LVEF. s/p Bumetanide gtt   Valves - S/p MitraClip. No active issues.  Rhythm - C/w monitor.    #Neuro - No active issues  #Resp - Off supplemental O2, sating well on RA. Vol optimize, as above.  #GI - Heart healthy diet. Trend liver tests, downtrending   #Endo - C/w insulin gtt. endo recs appreciated/ C/w home med Synthroid.   #Renal - Vol optimize, as above. MERVIN likely 2/2 acute congestion. continue to monitor creatinine.    #ID - Leukocytosis. Sepsis w/u unremarkable thus far- BCx and UCx NGTD; c/w Cefepime given MERVIN, to complete 7 day course on 10/14  #Hem - HSQ for DVT PPX. iron studies. will transfuse 1 unit PRBCS   #Ethics - FC.    José Daniels MD PGY-2  Department of Internal Medicine  Pager: [phone] ([facility]) /[phone] ([facility])   ====================

## 2019-10-12 NOTE — PROGRESS NOTE ADULT - SUBJECTIVE AND OBJECTIVE BOX
CHIEF COMPLAINT:  patient is 73-year-old female who was admitted secondary to shortness of breath2/2 acute decompensated heart failure  SUBJECTIVE:   she was hypotensive earlier today so received 250 cc of IV bolus and started on vasopressin patient laying in the bed without S OB but complained of weakness  REVIEW OF SYSTEMS:    CONSTITUTIONAL: ( x )  weakness,  (  ) fevers or chills  EYES/ENT: (  )visual changes;     NECK: (  ) pain or stiffness  RESPIRATORY:   (  )cough, wheezing, hemoptysis;  (  ) shortness of breath  CARDIOVASCULAR:  (  )chest pain or palpitations  GASTROINTESTINAL:   (  )abdominal or epigastric pain.  (  ) nausea, vomiting, or hematemesis;   (   ) diarrhea or constipation.   GENITOURINARY:   (    ) dysuria, frequency or hematuria  NEUROLOGICAL:  (   ) numbness or weakness   All other review of systems is negative unless indicated above    Vital Signs Last 24 Hrs  T(C): 36.7 (12 Oct 2019 16:00), Max: 37.1 (12 Oct 2019 05:00)  T(F): 98 (12 Oct 2019 16:00), Max: 98.7 (12 Oct 2019 05:00)  HR: 72 (12 Oct 2019 17:30) (66 - 148)  BP: 86/45 (12 Oct 2019 08:00) (86/45 - 86/45)  BP(mean): 58 (12 Oct 2019 08:00) (58 - 58)  RR: 24 (12 Oct 2019 17:30) (9 - 34)  SpO2: 100% (12 Oct 2019 17:30) (92% - 100%)    I&O's Summary    11 Oct 2019 07:01  -  12 Oct 2019 07:00  --------------------------------------------------------  IN: 1021.5 mL / OUT: 1950 mL / NET: -928.5 mL    12 Oct 2019 07:01  -  12 Oct 2019 17:41  --------------------------------------------------------  IN: 754.2 mL / OUT: 400 mL / NET: 354.2 mL        CAPILLARY BLOOD GLUCOSE      POCT Blood Glucose.: 445 mg/dL (12 Oct 2019 17:09)  POCT Blood Glucose.: 331 mg/dL (12 Oct 2019 08:02)  POCT Blood Glucose.: 324 mg/dL (11 Oct 2019 21:32)      PHYSICAL EXAM:    Constitutional:  ( x  ) NAD,   ( x  )awake and alert ill  appearing woman  HEENT: PERR, EOMI,    Neck: Soft and supple, No LAD, No JVD  Respiratory:  (  x  Breath sounds are clear bilaterally,    (   ) wheezing, rales or rhonchi  Cardiovascular:     (x   )S1 and S2, regular rate and rhythm, no Murmurs, gallops or rubs  Gastrointestinal:  ( x  )Bowel Sounds present, soft,   (  )nontender, nondistended,    Extremities:    (  ) peripheral edema  Vascular: 2+ peripheral pulses  Neurological:    ( x   )A/O x 3,   (  ) focal deficits  Musculoskeletal:    (   )  normal strength b/l upper  (     ) normal  lower extremities  Skin: No rashes    MEDICATIONS:  MEDICATIONS  (STANDING):  aspirin enteric coated 81 milliGRAM(s) Oral daily  cefepime   IVPB 1000 milliGRAM(s) IV Intermittent every 24 hours  chlorhexidine 2% Cloths 1 Application(s) Topical <User Schedule>  clopidogrel Tablet 75 milliGRAM(s) Oral daily  dextrose 5%. 1000 milliLiter(s) (50 mL/Hr) IV Continuous <Continuous>  dextrose 50% Injectable 12.5 Gram(s) IV Push once  dextrose 50% Injectable 25 Gram(s) IV Push once  dextrose 50% Injectable 25 Gram(s) IV Push once  docusate sodium 100 milliGRAM(s) Oral two times a day  heparin  Injectable 5000 Unit(s) SubCutaneous every 12 hours  insulin regular  human recombinant. 4 Unit(s) IV Push once  insulin regular Infusion 4 Unit(s)/Hr (4 mL/Hr) IV Continuous <Continuous>  levothyroxine 50 MICROGram(s) Oral daily  melatonin 5 milliGRAM(s) Oral at bedtime  pantoprazole    Tablet 40 milliGRAM(s) Oral before breakfast  polyethylene glycol 3350 17 Gram(s) Oral daily  senna 2 Tablet(s) Oral at bedtime  vasopressin Infusion 0.04 Unit(s)/Min (2.4 mL/Hr) IV Continuous <Continuous>      LABS: All Labs Reviewed:                        7.3    12.89 )-----------( 184      ( 12 Oct 2019 11:16 )             23.5     10-12    135  |  98  |  116<H>  ----------------------------<  387<H>  4.2   |  19<L>  |  2.96<H>    Ca    9.6      12 Oct 2019 11:16  Phos  5.3     10-12  Mg     2.4     10-12    TPro  6.8  /  Alb  3.1<L>  /  TBili  0.5  /  DBili  x   /  AST  245<H>  /  ALT  785<H>  /  AlkPhos  120  10-12    PTT - ( 11 Oct 2019 09:55 )  PTT:81.0 sec  ABG - ( 11 Oct 2019 09:47 )  pH, Arterial: 7.44  pH, Blood: x     /  pCO2: 34    /  pO2: 104   / HCO3: 23    / Base Excess: -.7   /  SaO2: 98          Blood Culture: 10-08 @ 14:47  Organism --  Gram Stain Blood -- Gram Stain --  Specimen Source .Blood  Culture-Blood --    10-08 @ 05:03  Organism --  Gram Stain Blood -- Gram Stain --  Specimen Source .Urine  Culture-Blood --    10-08 @ 01:44  Organism --  Gram Stain Blood -- Gram Stain --  Specimen Source .Blood  Culture-Blood --      Urine Culture      RADIOLOGY/EKG:  IMAGING:  < from: Xray Chest 1 View- PORTABLE-Routine (10.12.19 @ 08:28) >      IMPRESSION:  Clear lungs post removal of IABP and Pettigrew-Angi catheter.      ASSESSMENT AND PLAN:  72 y/o F w/ PMH of HTN, HLD, DM2, GERD, CAD s/p CABG (LIMA to LAD, SVG to OM, SVG to PDA; 2014), severe MR s/p MitraClip, severe pHTN, CKD4, hypoTH c/o dyspnea x 2 days suspect 2/2  NSTEMI +/- ADHF w/ e/o organ hypoperfusion.    #cardiovascular  NSTEMI, acute pulmonary edema, elevated lactic acid levels.  Elevated biomarkers and transaminitis.  Paroxysmal SVT noted.  RHC and IABP insertion. IIABP weaned yesterday. Patient now hypotensive requiring pressor support.       #Neuro - No active issues  #Resp - Off supplemental O2, sating well on RA. Vol optimize, as above.  #GI - Heart healthy diet. Trend liver tests, downtrending   #Endo -  Her blood sugar elevated today restarted back on IV insulin 4 cc/h   #Renal - Vol optimize, as above. MERVIN likely 2/2 acute congestion. continue to monitor creatinine.    #ID - Leukocytosis. Sepsis w/u unremarkable thus far- BCx and UCx NGTD; c/w Cefepime given MERVIN, to complete 7 day course on 10/14  #Hem - HSQ for DVT PPX. iron studies. will transfuse 1 unit PRBCS   #Ethics -  patient full code discuss with her family in the past they do not want to change the status         DVT PPX:    ADVANCED DIRECTIVE: full code    DISPOSITION: transferred to [facility] when stable discussed with the   CCU resident

## 2019-10-12 NOTE — PROGRESS NOTE ADULT - SUBJECTIVE AND OBJECTIVE BOX
Dr. Muhammad  Office [phone]  Cell [phone]  [facility]  Cell [phone]      Patient is a 73y old  Female who presents with a chief complaint of Acute decompensated heart failure, SOB (12 Oct 2019 07:13)      Patient seen and examined at bedside. No chest pain/sob    VITALS:  T(F): 98.4 (10-12-19 @ 14:45), Max: 98.7 (10-12-19 @ 05:00)  HR: 74 (10-12-19 @ 15:45)  BP: 86/45 (10-12-19 @ 08:00)  RR: 17 (10-12-19 @ 15:45)  SpO2: 100% (10-12-19 @ 15:45)  Wt(kg): --    10-11 @ 07:01  -  10-12 @ 07:00  --------------------------------------------------------  IN: 1021.5 mL / OUT: 1950 mL / NET: -928.5 mL    10-12 @ 07:01  -  10-12 @ 17:04  --------------------------------------------------------  IN: 619.4 mL / OUT: 400 mL / NET: 219.4 mL          PHYSICAL EXAM:  Constitutional: NAD  Neck: No JVD  Respiratory: CTAB, no wheezes, rales or rhonchi  Cardiovascular: S1, S2, RRR  Gastrointestinal: BS+, soft, NT/ND  Extremities: No peripheral edema    Hospital Medications:   MEDICATIONS  (STANDING):  aspirin enteric coated 81 milliGRAM(s) Oral daily  cefepime   IVPB 1000 milliGRAM(s) IV Intermittent every 24 hours  chlorhexidine 2% Cloths 1 Application(s) Topical <User Schedule>  clopidogrel Tablet 75 milliGRAM(s) Oral daily  dextrose 5%. 1000 milliLiter(s) (50 mL/Hr) IV Continuous <Continuous>  dextrose 50% Injectable 12.5 Gram(s) IV Push once  dextrose 50% Injectable 25 Gram(s) IV Push once  dextrose 50% Injectable 25 Gram(s) IV Push once  docusate sodium 100 milliGRAM(s) Oral two times a day  heparin  Injectable 5000 Unit(s) SubCutaneous every 12 hours  insulin glargine Injectable (LANTUS) 36 Unit(s) SubCutaneous at bedtime  insulin lispro (HumaLOG) corrective regimen sliding scale   SubCutaneous at bedtime  insulin lispro (HumaLOG) corrective regimen sliding scale   SubCutaneous three times a day before meals  insulin lispro Injectable (HumaLOG) 10 Unit(s) SubCutaneous three times a day before meals  levothyroxine 50 MICROGram(s) Oral daily  melatonin 5 milliGRAM(s) Oral at bedtime  metoprolol tartrate 25 milliGRAM(s) Oral two times a day  pantoprazole    Tablet 40 milliGRAM(s) Oral before breakfast  polyethylene glycol 3350 17 Gram(s) Oral daily  senna 2 Tablet(s) Oral at bedtime  vasopressin Infusion 0.04 Unit(s)/Min (2.4 mL/Hr) IV Continuous <Continuous>      LABS:  10-12    135  |  98  |  116<H>  ----------------------------<  387<H>  4.2   |  19<L>  |  2.96<H>    Ca    9.6      12 Oct 2019 11:16  Phos  5.3     10-12  Mg     2.4     10-12    TPro  6.8  /  Alb  3.1<L>  /  TBili  0.5  /  DBili      /  AST  245<H>  /  ALT  785<H>  /  AlkPhos  120  10-12    Creatinine Trend: 2.96 <--, 2.99 <--, 3.17 <--, 3.21 <--, 3.01 <--, 3.05 <--, 2.90 <--, 2.86 <--, 2.92 <--, 3.04 <--, 3.08 <--, 3.17 <--, 3.26 <--, 2.73 <--, 3.00 <--    Albumin, Serum: 3.1 g/dL (10-12 @ 11:16)  Phosphorus Level, Serum: 5.3 mg/dL (10-12 @ 11:16)  Albumin, Serum: 3.4 g/dL (10-12 @ 03:57)  Phosphorus Level, Serum: 5.6 mg/dL (10-12 @ 03:57)                              7.3    12.89 )-----------( 184      ( 12 Oct 2019 11:16 )             23.5     Urine Studies:  Urinalysis - [10-08-19 @ 02:35]      Color Yellow / Appearance Clear / SG 1.016 / pH 5.5      Gluc Trace / Ketone Negative  / Bili Negative / Urobili Negative       Blood Negative / Protein 30 mg/dL / Leuk Est Negative / Nitrite Negative      RBC 1 / WBC 0 / Hyaline 6 / Gran 3-5 / Sq Epi  / Non Sq Epi 2 / Bacteria Negative    Urine Creatinine 20      [10-08-19 @ 20:53]  Urine Protein 20      [10-08-19 @ 23:13]  Urine Sodium 67      [10-08-19 @ 20:53]  Urine Osmolality 318      [10-08-19 @ 21:27]    Iron 42, TIBC 348, %sat 12      [07-05-19 @ 22:32]  Ferritin 130      [07-05-19 @ 22:32]  .4 (Ca --)      [04-25-19 @ 05:45]   --  Vitamin D (25OH) 25.9      [05-01-19 @ 04:00]  HbA1c 7.5      [10-08-19 @ 14:30]  TSH 1.01      [10-08-19 @ 14:47]  Lipid: chol 83, , HDL 14, LDL 46      [10-08-19 @ 14:48]        RADIOLOGY & ADDITIONAL STUDIES:

## 2019-10-13 LAB
ALBUMIN SERPL ELPH-MCNC: 3.5 G/DL — SIGNIFICANT CHANGE UP (ref 3.3–5)
ALBUMIN SERPL ELPH-MCNC: 3.5 G/DL — SIGNIFICANT CHANGE UP (ref 3.3–5)
ALP SERPL-CCNC: 118 U/L — SIGNIFICANT CHANGE UP (ref 40–120)
ALP SERPL-CCNC: 120 U/L — SIGNIFICANT CHANGE UP (ref 40–120)
ALT FLD-CCNC: 691 U/L — HIGH (ref 10–45)
ALT FLD-CCNC: 712 U/L — HIGH (ref 10–45)
ANION GAP SERPL CALC-SCNC: 16 MMOL/L — SIGNIFICANT CHANGE UP (ref 5–17)
ANION GAP SERPL CALC-SCNC: 17 MMOL/L — SIGNIFICANT CHANGE UP (ref 5–17)
ANION GAP SERPL CALC-SCNC: 18 MMOL/L — HIGH (ref 5–17)
AST SERPL-CCNC: 153 U/L — HIGH (ref 10–40)
AST SERPL-CCNC: 169 U/L — HIGH (ref 10–40)
BILIRUB SERPL-MCNC: 0.5 MG/DL — SIGNIFICANT CHANGE UP (ref 0.2–1.2)
BILIRUB SERPL-MCNC: 0.5 MG/DL — SIGNIFICANT CHANGE UP (ref 0.2–1.2)
BUN SERPL-MCNC: 100 MG/DL — HIGH (ref 7–23)
BUN SERPL-MCNC: 100 MG/DL — HIGH (ref 7–23)
BUN SERPL-MCNC: 104 MG/DL — HIGH (ref 7–23)
CALCIUM SERPL-MCNC: 10 MG/DL — SIGNIFICANT CHANGE UP (ref 8.4–10.5)
CALCIUM SERPL-MCNC: 9.3 MG/DL — SIGNIFICANT CHANGE UP (ref 8.4–10.5)
CALCIUM SERPL-MCNC: 9.4 MG/DL — SIGNIFICANT CHANGE UP (ref 8.4–10.5)
CHLORIDE SERPL-SCNC: 100 MMOL/L — SIGNIFICANT CHANGE UP (ref 96–108)
CHLORIDE SERPL-SCNC: 94 MMOL/L — LOW (ref 96–108)
CHLORIDE SERPL-SCNC: 99 MMOL/L — SIGNIFICANT CHANGE UP (ref 96–108)
CK MB BLD-MCNC: 10.4 % — HIGH (ref 0–3.5)
CK MB CFR SERPL CALC: 2.8 NG/ML — SIGNIFICANT CHANGE UP (ref 0–3.8)
CK SERPL-CCNC: 27 U/L — SIGNIFICANT CHANGE UP (ref 25–170)
CO2 SERPL-SCNC: 18 MMOL/L — LOW (ref 22–31)
CO2 SERPL-SCNC: 20 MMOL/L — LOW (ref 22–31)
CO2 SERPL-SCNC: 21 MMOL/L — LOW (ref 22–31)
CREAT SERPL-MCNC: 2.36 MG/DL — HIGH (ref 0.5–1.3)
CREAT SERPL-MCNC: 2.52 MG/DL — HIGH (ref 0.5–1.3)
CREAT SERPL-MCNC: 2.64 MG/DL — HIGH (ref 0.5–1.3)
CULTURE RESULTS: SIGNIFICANT CHANGE UP
FERRITIN SERPL-MCNC: 78 NG/ML — SIGNIFICANT CHANGE UP (ref 15–150)
GLUCOSE SERPL-MCNC: 145 MG/DL — HIGH (ref 70–99)
GLUCOSE SERPL-MCNC: 289 MG/DL — HIGH (ref 70–99)
GLUCOSE SERPL-MCNC: 522 MG/DL — CRITICAL HIGH (ref 70–99)
HAPTOGLOB SERPL-MCNC: 90 MG/DL — SIGNIFICANT CHANGE UP (ref 34–200)
HCT VFR BLD CALC: 29.2 % — LOW (ref 34.5–45)
HGB BLD-MCNC: 9.7 G/DL — LOW (ref 11.5–15.5)
IRON SATN MFR SERPL: 10 % — LOW (ref 14–50)
IRON SATN MFR SERPL: 44 UG/DL — SIGNIFICANT CHANGE UP (ref 30–160)
LDH SERPL L TO P-CCNC: 354 U/L — HIGH (ref 50–242)
MAGNESIUM SERPL-MCNC: 2.7 MG/DL — HIGH (ref 1.6–2.6)
MAGNESIUM SERPL-MCNC: 2.8 MG/DL — HIGH (ref 1.6–2.6)
MCHC RBC-ENTMCNC: 27.7 PG — SIGNIFICANT CHANGE UP (ref 27–34)
MCHC RBC-ENTMCNC: 33.2 GM/DL — SIGNIFICANT CHANGE UP (ref 32–36)
MCV RBC AUTO: 83.4 FL — SIGNIFICANT CHANGE UP (ref 80–100)
NRBC # BLD: 0 /100 WBCS — SIGNIFICANT CHANGE UP (ref 0–0)
PHOSPHATE SERPL-MCNC: 6 MG/DL — HIGH (ref 2.5–4.5)
PHOSPHATE SERPL-MCNC: 6.1 MG/DL — HIGH (ref 2.5–4.5)
PLATELET # BLD AUTO: 184 K/UL — SIGNIFICANT CHANGE UP (ref 150–400)
POTASSIUM SERPL-MCNC: 3.6 MMOL/L — SIGNIFICANT CHANGE UP (ref 3.5–5.3)
POTASSIUM SERPL-MCNC: 3.9 MMOL/L — SIGNIFICANT CHANGE UP (ref 3.5–5.3)
POTASSIUM SERPL-MCNC: 4.3 MMOL/L — SIGNIFICANT CHANGE UP (ref 3.5–5.3)
POTASSIUM SERPL-SCNC: 3.6 MMOL/L — SIGNIFICANT CHANGE UP (ref 3.5–5.3)
POTASSIUM SERPL-SCNC: 3.9 MMOL/L — SIGNIFICANT CHANGE UP (ref 3.5–5.3)
POTASSIUM SERPL-SCNC: 4.3 MMOL/L — SIGNIFICANT CHANGE UP (ref 3.5–5.3)
PROT SERPL-MCNC: 7.4 G/DL — SIGNIFICANT CHANGE UP (ref 6–8.3)
PROT SERPL-MCNC: 7.6 G/DL — SIGNIFICANT CHANGE UP (ref 6–8.3)
RBC # BLD: 3.5 M/UL — LOW (ref 3.8–5.2)
RBC # FLD: 17.1 % — HIGH (ref 10.3–14.5)
SODIUM SERPL-SCNC: 128 MMOL/L — LOW (ref 135–145)
SODIUM SERPL-SCNC: 137 MMOL/L — SIGNIFICANT CHANGE UP (ref 135–145)
SODIUM SERPL-SCNC: 138 MMOL/L — SIGNIFICANT CHANGE UP (ref 135–145)
SPECIMEN SOURCE: SIGNIFICANT CHANGE UP
TIBC SERPL-MCNC: 424 UG/DL — SIGNIFICANT CHANGE UP (ref 220–430)
TROPONIN T, HIGH SENSITIVITY RESULT: 520 NG/L — HIGH (ref 0–51)
UIBC SERPL-MCNC: 380 UG/DL — HIGH (ref 110–370)
WBC # BLD: 10.85 K/UL — HIGH (ref 3.8–10.5)
WBC # FLD AUTO: 10.85 K/UL — HIGH (ref 3.8–10.5)

## 2019-10-13 PROCEDURE — 99291 CRITICAL CARE FIRST HOUR: CPT

## 2019-10-13 PROCEDURE — 93306 TTE W/DOPPLER COMPLETE: CPT | Mod: 26

## 2019-10-13 PROCEDURE — 93010 ELECTROCARDIOGRAM REPORT: CPT

## 2019-10-13 RX ORDER — CEFEPIME 1 G/1
1000 INJECTION, POWDER, FOR SOLUTION INTRAMUSCULAR; INTRAVENOUS EVERY 24 HOURS
Refills: 0 | Status: COMPLETED | OUTPATIENT
Start: 2019-10-14 | End: 2019-10-15

## 2019-10-13 RX ORDER — INSULIN LISPRO 100/ML
8 VIAL (ML) SUBCUTANEOUS
Refills: 0 | Status: DISCONTINUED | OUTPATIENT
Start: 2019-10-13 | End: 2019-10-13

## 2019-10-13 RX ORDER — INSULIN GLARGINE 100 [IU]/ML
15 INJECTION, SOLUTION SUBCUTANEOUS ONCE
Refills: 0 | Status: COMPLETED | OUTPATIENT
Start: 2019-10-13 | End: 2019-10-13

## 2019-10-13 RX ORDER — ATORVASTATIN CALCIUM 80 MG/1
80 TABLET, FILM COATED ORAL AT BEDTIME
Refills: 0 | Status: DISCONTINUED | OUTPATIENT
Start: 2019-10-13 | End: 2019-10-18

## 2019-10-13 RX ORDER — INSULIN LISPRO 100/ML
4 VIAL (ML) SUBCUTANEOUS ONCE
Refills: 0 | Status: COMPLETED | OUTPATIENT
Start: 2019-10-13 | End: 2019-10-13

## 2019-10-13 RX ORDER — INSULIN GLARGINE 100 [IU]/ML
30 INJECTION, SOLUTION SUBCUTANEOUS AT BEDTIME
Refills: 0 | Status: DISCONTINUED | OUTPATIENT
Start: 2019-10-13 | End: 2019-10-13

## 2019-10-13 RX ORDER — INSULIN GLARGINE 100 [IU]/ML
40 INJECTION, SOLUTION SUBCUTANEOUS AT BEDTIME
Refills: 0 | Status: DISCONTINUED | OUTPATIENT
Start: 2019-10-13 | End: 2019-10-14

## 2019-10-13 RX ORDER — INSULIN LISPRO 100/ML
VIAL (ML) SUBCUTANEOUS
Refills: 0 | Status: DISCONTINUED | OUTPATIENT
Start: 2019-10-13 | End: 2019-10-18

## 2019-10-13 RX ORDER — INSULIN LISPRO 100/ML
12 VIAL (ML) SUBCUTANEOUS
Refills: 0 | Status: DISCONTINUED | OUTPATIENT
Start: 2019-10-13 | End: 2019-10-14

## 2019-10-13 RX ORDER — INSULIN LISPRO 100/ML
VIAL (ML) SUBCUTANEOUS AT BEDTIME
Refills: 0 | Status: DISCONTINUED | OUTPATIENT
Start: 2019-10-13 | End: 2019-10-18

## 2019-10-13 RX ORDER — POTASSIUM CHLORIDE 20 MEQ
20 PACKET (EA) ORAL ONCE
Refills: 0 | Status: COMPLETED | OUTPATIENT
Start: 2019-10-13 | End: 2019-10-13

## 2019-10-13 RX ADMIN — VASOPRESSIN 1.2 UNIT(S)/MIN: 20 INJECTION INTRAVENOUS at 19:19

## 2019-10-13 RX ADMIN — ATORVASTATIN CALCIUM 80 MILLIGRAM(S): 80 TABLET, FILM COATED ORAL at 23:12

## 2019-10-13 RX ADMIN — VASOPRESSIN 2.4 UNIT(S)/MIN: 20 INJECTION INTRAVENOUS at 01:34

## 2019-10-13 RX ADMIN — Medication 8 UNIT(S): at 08:59

## 2019-10-13 RX ADMIN — INSULIN GLARGINE 40 UNIT(S): 100 INJECTION, SOLUTION SUBCUTANEOUS at 21:04

## 2019-10-13 RX ADMIN — Medication 3: at 21:04

## 2019-10-13 RX ADMIN — VASOPRESSIN 2.4 UNIT(S)/MIN: 20 INJECTION INTRAVENOUS at 07:45

## 2019-10-13 RX ADMIN — VASOPRESSIN 1.2 UNIT(S)/MIN: 20 INJECTION INTRAVENOUS at 12:27

## 2019-10-13 RX ADMIN — Medication 5 MILLIGRAM(S): at 21:04

## 2019-10-13 RX ADMIN — SENNA PLUS 2 TABLET(S): 8.6 TABLET ORAL at 21:04

## 2019-10-13 RX ADMIN — PANTOPRAZOLE SODIUM 40 MILLIGRAM(S): 20 TABLET, DELAYED RELEASE ORAL at 05:25

## 2019-10-13 RX ADMIN — Medication 0.25 MILLIGRAM(S): at 16:39

## 2019-10-13 RX ADMIN — Medication 0.25 MILLIGRAM(S): at 23:11

## 2019-10-13 RX ADMIN — Medication 4: at 08:58

## 2019-10-13 RX ADMIN — CEFEPIME 100 MILLIGRAM(S): 1 INJECTION, POWDER, FOR SOLUTION INTRAMUSCULAR; INTRAVENOUS at 09:41

## 2019-10-13 RX ADMIN — POLYETHYLENE GLYCOL 3350 17 GRAM(S): 17 POWDER, FOR SOLUTION ORAL at 11:25

## 2019-10-13 RX ADMIN — CLOPIDOGREL BISULFATE 75 MILLIGRAM(S): 75 TABLET, FILM COATED ORAL at 11:25

## 2019-10-13 RX ADMIN — Medication 100 MILLIGRAM(S): at 17:11

## 2019-10-13 RX ADMIN — Medication 20 MILLIEQUIVALENT(S): at 00:34

## 2019-10-13 RX ADMIN — HEPARIN SODIUM 5000 UNIT(S): 5000 INJECTION INTRAVENOUS; SUBCUTANEOUS at 05:24

## 2019-10-13 RX ADMIN — Medication 81 MILLIGRAM(S): at 11:25

## 2019-10-13 RX ADMIN — Medication 50 MICROGRAM(S): at 05:24

## 2019-10-13 RX ADMIN — Medication 4 UNIT(S): at 13:16

## 2019-10-13 RX ADMIN — Medication 12 UNIT(S): at 17:08

## 2019-10-13 RX ADMIN — CHLORHEXIDINE GLUCONATE 1 APPLICATION(S): 213 SOLUTION TOPICAL at 05:24

## 2019-10-13 RX ADMIN — Medication 6: at 12:18

## 2019-10-13 RX ADMIN — HEPARIN SODIUM 5000 UNIT(S): 5000 INJECTION INTRAVENOUS; SUBCUTANEOUS at 17:11

## 2019-10-13 RX ADMIN — Medication 6: at 17:07

## 2019-10-13 RX ADMIN — Medication 100 MILLIGRAM(S): at 05:23

## 2019-10-13 RX ADMIN — Medication 8 UNIT(S): at 12:18

## 2019-10-13 RX ADMIN — INSULIN GLARGINE 15 UNIT(S): 100 INJECTION, SOLUTION SUBCUTANEOUS at 05:23

## 2019-10-13 NOTE — PROGRESS NOTE ADULT - SUBJECTIVE AND OBJECTIVE BOX
CHIEF COMPLAINT: patient was seen and in a.m. with nursing team she was sitting at the bedside to be out of bed to chair  patient is 73-year-old female who was admitted secondary to shortness of breath2/2 acute decompensated heart failure  SUBJECTIVE:   she was hypotensive earlier today so received 250 cc of IV bolus and started on vasopressin patient laying in the bed without S OB but complained of weakness    SUBJECTIVE:     REVIEW OF SYSTEMS:    CONSTITUTIONAL: (x  )  weakness,  (  ) fevers or chills  EYES/ENT: (  )visual changes;     NECK: (  ) pain or stiffness  RESPIRATORY:   (  )cough, wheezing, hemoptysis;  (  ) shortness of breath  CARDIOVASCULAR:  (  )chest pain or palpitations  GASTROINTESTINAL:   (  )abdominal or epigastric pain.  (  ) nausea, vomiting, or hematemesis;   (   ) diarrhea or constipation.   GENITOURINARY:   (    ) dysuria, frequency or hematuria  NEUROLOGICAL:  (   ) numbness or weakness   All other review of systems is negative unless indicated above    Vital Signs Last 24 Hrs  T(C): 36.2 (13 Oct 2019 16:00), Max: 36.9 (12 Oct 2019 19:30)  T(F): 97.2 (13 Oct 2019 16:00), Max: 98.5 (12 Oct 2019 19:30)  HR: 66 (13 Oct 2019 18:35) (62 - 134)  BP: 99/64 (13 Oct 2019 07:35) (99/64 - 105/57)  BP(mean): 77 (13 Oct 2019 07:35) (72 - 77)  RR: 22 (13 Oct 2019 18:35) (11 - 36)  SpO2: 97% (13 Oct 2019 18:35) (94% - 100%)    I&O's Summary    12 Oct 2019 07:01  -  13 Oct 2019 07:00  --------------------------------------------------------  IN: 1172.2 mL / OUT: 1100 mL / NET: 72.2 mL    13 Oct 2019 07:01  -  13 Oct 2019 19:14  --------------------------------------------------------  IN: 1026.8 mL / OUT: 550 mL / NET: 476.8 mL        CAPILLARY BLOOD GLUCOSE      POCT Blood Glucose.: 436 mg/dL (13 Oct 2019 16:37)  POCT Blood Glucose.: 424 mg/dL (13 Oct 2019 16:36)  POCT Blood Glucose.: 496 mg/dL (13 Oct 2019 13:15)  POCT Blood Glucose.: 485 mg/dL (13 Oct 2019 13:14)  POCT Blood Glucose.: 489 mg/dL (13 Oct 2019 12:03)  POCT Blood Glucose.: 473 mg/dL (13 Oct 2019 12:01)  POCT Blood Glucose.: 325 mg/dL (13 Oct 2019 08:32)  POCT Blood Glucose.: 189 mg/dL (13 Oct 2019 06:04)  POCT Blood Glucose.: 141 mg/dL (13 Oct 2019 04:58)  POCT Blood Glucose.: 131 mg/dL (13 Oct 2019 04:11)  POCT Blood Glucose.: 148 mg/dL (13 Oct 2019 03:04)  POCT Blood Glucose.: 167 mg/dL (13 Oct 2019 02:02)  POCT Blood Glucose.: 186 mg/dL (13 Oct 2019 01:07)  POCT Blood Glucose.: 225 mg/dL (12 Oct 2019 23:57)  POCT Blood Glucose.: 281 mg/dL (12 Oct 2019 23:13)  POCT Blood Glucose.: 358 mg/dL (12 Oct 2019 21:56)  POCT Blood Glucose.: 396 mg/dL (12 Oct 2019 21:03)  POCT Blood Glucose.: 440 mg/dL (12 Oct 2019 19:58)      PHYSICAL EXAM:     Constitutional:  ( x  ) NAD,   ( x  )awake and alert ill  appearing woman  HEENT: PERR, EOMI,    Neck: Soft and supple, No LAD, No JVD  Respiratory:  (  x  Breath sounds are clear bilaterally,    (   ) wheezing, rales or rhonchi  Cardiovascular:     (x   )S1 and S2, regular rate and rhythm, no Murmurs, gallops or rubs  Gastrointestinal:  ( x  )Bowel Sounds present, soft,   (  )nontender, nondistended,    Extremities:    (  ) peripheral edema  Vascular: 2+ peripheral pulses  Neurological:    ( x   )A/O x 3,   (  ) focal deficits  Musculoskeletal:    (   )  normal strength b/l upper  (     ) normal  lower extremities  Skin: No rashes      MEDICATIONS:  MEDICATIONS  (STANDING):  aspirin enteric coated 81 milliGRAM(s) Oral daily  chlorhexidine 2% Cloths 1 Application(s) Topical <User Schedule>  clopidogrel Tablet 75 milliGRAM(s) Oral daily  dextrose 5%. 1000 milliLiter(s) (50 mL/Hr) IV Continuous <Continuous>  dextrose 50% Injectable 12.5 Gram(s) IV Push once  dextrose 50% Injectable 25 Gram(s) IV Push once  dextrose 50% Injectable 25 Gram(s) IV Push once  docusate sodium 100 milliGRAM(s) Oral two times a day  heparin  Injectable 5000 Unit(s) SubCutaneous every 12 hours  insulin glargine Injectable (LANTUS) 40 Unit(s) SubCutaneous at bedtime  insulin lispro (HumaLOG) corrective regimen sliding scale   SubCutaneous three times a day before meals  insulin lispro (HumaLOG) corrective regimen sliding scale   SubCutaneous at bedtime  insulin lispro Injectable (HumaLOG) 12 Unit(s) SubCutaneous three times a day before meals  levothyroxine 50 MICROGram(s) Oral daily  melatonin 5 milliGRAM(s) Oral at bedtime  pantoprazole    Tablet 40 milliGRAM(s) Oral before breakfast  polyethylene glycol 3350 17 Gram(s) Oral daily  senna 2 Tablet(s) Oral at bedtime  vasopressin Infusion 0.02 Unit(s)/Min (1.2 mL/Hr) IV Continuous <Continuous>      LABS: All Labs Reviewed:                        9.7    10.85 )-----------( 184      ( 13 Oct 2019 04:46 )             29.2     10-13    x   |  x   |  x   ----------------------------<  451<HH>  x    |  x   |  x     Ca    9.4      13 Oct 2019 12:27  Phos  6.0     10-13  Mg     2.8     10-13    TPro  7.6  /  Alb  3.5  /  TBili  0.5  /  DBili  x   /  AST  153<H>  /  ALT  691<H>  /  AlkPhos  118  10-13      CARDIAC MARKERS ( 13 Oct 2019 16:48 )  x     / x     / 27 U/L / x     / 2.8 ng/mL      Blood Culture:   Urine Culture      RADIOLOGY/EKG:    ASSESSMENT AND PLAN:  74 y/o F w/ PMH of HTN, HLD, DM2, GERD, CAD s/p CABG (LIMA to LAD, SVG to OM, SVG to PDA; 2014), severe MR s/p MitraClip, severe pHTN, CKD4, hypoTH c/o dyspnea x 2 days suspect 2/2  NSTEMI +/- ADHF w/ e/o organ hypoperfusion.    #cardiovascular  NSTEMI, acute pulmonary edema, elevated lactic acid levels.  Elevated biomarkers and transaminitis.  Paroxysmal SVT noted.  RHC and IABP insertion. IIABP weaned yesterday. Patient now hypotensive requiring pressor support.       #Neuro - No active issues  #Resp - Off supplemental O2, sating well on RA. Vol optimize, as above.  #GI - Heart healthy diet. Trend liver tests, downtrending   #Endo -  Her blood sugar elevated today  off   IV insulin 4 cc/h      insulin glargine Injectable (LANTUS) 40 Unit(s) SubCutaneous at bedtime  insulin lispro (HumaLOG) corrective regimen sliding scale   SubCutaneous three times a day before meals  insulin lispro (HumaLOG) corrective regimen sliding scale   SubCutaneous at bedtime  insulin lispro Injectable (HumaLOG) 12 Unit(s) SubCutaneous three times a day before meals    #Renal - Vol optimize, as above. MERVIN likely 2/2 acute congestion. continue to monitor creatinine.    #ID - Leukocytosis. Sepsis w/u unremarkable thus far- BCx and UCx NGTD; c/w Cefepime given MERVIN, to complete 7 day course on 10/14  #Hem - HSQ for DVT PPX. iron studies. will transfuse 1 unit PRBCS   #Ethics -  patient full code discuss with her family in the past they do not want to change the status         DVT PPX:    ADVANCED DIRECTIVE: full code

## 2019-10-13 NOTE — PROGRESS NOTE ADULT - SUBJECTIVE AND OBJECTIVE BOX
====================  CCU MIDNIGHT ROUNDS  ====================    SELVIN CLAIRE  [id number]  Patient is a 73y old  Female who presents with a chief complaint of Acute decompensated heart failure, SOB (13 Oct 2019 19:13)      ====================  SUMMARY:  72 yo F w/ PMH of HTN, HLD, DM2, GERD, CAD s/p CABG (LIMA to LAD, SVG to OM, SVG to PDA; 2014), severe MR s/p MitraClip, severe pHTN, CKD4, hypoTH c/o dyspnea x 2D suspect 2/2 NSTEMI +/- ADHF w/ e/o organ hypoperfusion. s/p RHC/IABP without LHC due to MERVIN on CKD, now weaned off, with runs of SVT overnight that received adenosine/metoprolol, now persistently hypotensive requiring vasopressin gtt.   ====================      ====================  NEW EVENTS:  ====================    MEDICATIONS  (STANDING):  aspirin enteric coated 81 milliGRAM(s) Oral daily  chlorhexidine 2% Cloths 1 Application(s) Topical <User Schedule>  clopidogrel Tablet 75 milliGRAM(s) Oral daily  dextrose 5%. 1000 milliLiter(s) (50 mL/Hr) IV Continuous <Continuous>  dextrose 50% Injectable 12.5 Gram(s) IV Push once  dextrose 50% Injectable 25 Gram(s) IV Push once  dextrose 50% Injectable 25 Gram(s) IV Push once  docusate sodium 100 milliGRAM(s) Oral two times a day  heparin  Injectable 5000 Unit(s) SubCutaneous every 12 hours  insulin glargine Injectable (LANTUS) 40 Unit(s) SubCutaneous at bedtime  insulin lispro (HumaLOG) corrective regimen sliding scale   SubCutaneous three times a day before meals  insulin lispro (HumaLOG) corrective regimen sliding scale   SubCutaneous at bedtime  insulin lispro Injectable (HumaLOG) 12 Unit(s) SubCutaneous three times a day before meals  levothyroxine 50 MICROGram(s) Oral daily  melatonin 5 milliGRAM(s) Oral at bedtime  pantoprazole    Tablet 40 milliGRAM(s) Oral before breakfast  polyethylene glycol 3350 17 Gram(s) Oral daily  senna 2 Tablet(s) Oral at bedtime  vasopressin Infusion 0.02 Unit(s)/Min (1.2 mL/Hr) IV Continuous <Continuous>    MEDICATIONS  (PRN):  ALPRAZolam 0.25 milliGRAM(s) Oral three times a day PRN Anxiety  dextrose 40% Gel 15 Gram(s) Oral once PRN Blood Glucose LESS THAN 70 milliGRAM(s)/deciliter  glucagon  Injectable 1 milliGRAM(s) IntraMuscular once PRN Glucose LESS THAN 70 milligrams/deciliter      ====================  VITALS (Last 12 hrs):  ====================    T(C): 36.3 (10-13-19 @ 19:30), Max: 36.4 (10-13-19 @ 11:30)  T(F): 97.3 (10-13-19 @ 19:30), Max: 97.6 (10-13-19 @ 11:30)  HR: 70 (10-13-19 @ 21:00) (64 - 80)  ABP: 104/44 (10-13-19 @ 21:00) (94/36 - 116/52)  ABP(mean): 66 (10-13-19 @ 21:00) (58 - 78)  RR: 19 (10-13-19 @ 21:00) (17 - 36)  SpO2: 99% (10-13-19 @ 21:00) (95% - 99%)      I&O's Summary    12 Oct 2019 07:01  -  13 Oct 2019 07:00  --------------------------------------------------------  IN: 1172.2 mL / OUT: 1100 mL / NET: 72.2 mL    13 Oct 2019 07:01  -  13 Oct 2019 21:36  --------------------------------------------------------  IN: 1151.6 mL / OUT: 550 mL / NET: 601.6 mL    ====================  NEW LABS:  ====================      10-13    x   |  x   |  x   ----------------------------<  451<HH>  x    |  x   |  x     Ca    9.4      13 Oct 2019 12:27  Phos  6.0     10-13  Mg     2.8     10-13    TPro  7.6  /  Alb  3.5  /  TBili  0.5  /  DBili  x   /  AST  153<H>  /  ALT  691<H>  /  AlkPhos  118  10-13      Creatine Kinase, Serum: 27 U/L (10-13-19 @ 16:48)    CKMB Units: 2.8 ng/mL (10-13 @ 16:48)    ABG - ( 12 Oct 2019 11:07 )  pH, Arterial: 7.46  pH, Blood: x     /  pCO2: 33    /  pO2: 101   / HCO3: 23    / Base Excess: -.3   /  SaO2: 98        ====================  PLAN:  ====================  #Cardiac  CAD  -Eventual LHC when SCr improves   -c/w DAPT and high dose statin   -trend CE, previous Trop T 520  -c/w vasopressin gtt, wean off as tolerated keep MAP >65    #Renal  MERVIN  -Trending BUN/SCr  -holding diuresis, monitor urine out put  -avoid nephrotoxic meds, renally dose    #ID   Leukocytosis  -c/w cefepime 1g q24hrs for course of 7 days completing on 10/14  -blood culture 10/8 ngtd          Mecca Prado CCU NP  Beeper #[id number]  Spectra # [phone] ====================  CCU MIDNIGHT ROUNDS  ====================    SELVIN CLAIRE  [id number]  Patient is a 73y old  Female who presents with a chief complaint of Acute decompensated heart failure, SOB (13 Oct 2019 19:13)      ====================  SUMMARY:  72 yo F w/ PMH of HTN, HLD, DM2, GERD, CAD s/p CABG (LIMA to LAD, SVG to OM, SVG to PDA; 2014), severe MR s/p MitraClip, severe pHTN, CKD4, hypoTH c/o dyspnea x 2D suspect 2/2 NSTEMI +/- ADHF w/ e/o organ hypoperfusion. s/p RHC/IABP without LHC due to MERVIN on CKD, now weaned off, with runs of SVT overnight that received adenosine/metoprolol, now persistently hypotensive requiring vasopressin gtt.   ====================      ====================  NEW EVENTS:  ====================    MEDICATIONS  (STANDING):  aspirin enteric coated 81 milliGRAM(s) Oral daily  chlorhexidine 2% Cloths 1 Application(s) Topical <User Schedule>  clopidogrel Tablet 75 milliGRAM(s) Oral daily  dextrose 5%. 1000 milliLiter(s) (50 mL/Hr) IV Continuous <Continuous>  dextrose 50% Injectable 12.5 Gram(s) IV Push once  dextrose 50% Injectable 25 Gram(s) IV Push once  dextrose 50% Injectable 25 Gram(s) IV Push once  docusate sodium 100 milliGRAM(s) Oral two times a day  heparin  Injectable 5000 Unit(s) SubCutaneous every 12 hours  insulin glargine Injectable (LANTUS) 40 Unit(s) SubCutaneous at bedtime  insulin lispro (HumaLOG) corrective regimen sliding scale   SubCutaneous three times a day before meals  insulin lispro (HumaLOG) corrective regimen sliding scale   SubCutaneous at bedtime  insulin lispro Injectable (HumaLOG) 12 Unit(s) SubCutaneous three times a day before meals  levothyroxine 50 MICROGram(s) Oral daily  melatonin 5 milliGRAM(s) Oral at bedtime  pantoprazole    Tablet 40 milliGRAM(s) Oral before breakfast  polyethylene glycol 3350 17 Gram(s) Oral daily  senna 2 Tablet(s) Oral at bedtime  vasopressin Infusion 0.02 Unit(s)/Min (1.2 mL/Hr) IV Continuous <Continuous>    MEDICATIONS  (PRN):  ALPRAZolam 0.25 milliGRAM(s) Oral three times a day PRN Anxiety  dextrose 40% Gel 15 Gram(s) Oral once PRN Blood Glucose LESS THAN 70 milliGRAM(s)/deciliter  glucagon  Injectable 1 milliGRAM(s) IntraMuscular once PRN Glucose LESS THAN 70 milligrams/deciliter      ====================  VITALS (Last 12 hrs):  ====================    T(C): 36.3 (10-13-19 @ 19:30), Max: 36.4 (10-13-19 @ 11:30)  T(F): 97.3 (10-13-19 @ 19:30), Max: 97.6 (10-13-19 @ 11:30)  HR: 70 (10-13-19 @ 21:00) (64 - 80)  ABP: 104/44 (10-13-19 @ 21:00) (94/36 - 116/52)  ABP(mean): 66 (10-13-19 @ 21:00) (58 - 78)  RR: 19 (10-13-19 @ 21:00) (17 - 36)  SpO2: 99% (10-13-19 @ 21:00) (95% - 99%)      I&O's Summary    12 Oct 2019 07:01  -  13 Oct 2019 07:00  --------------------------------------------------------  IN: 1172.2 mL / OUT: 1100 mL / NET: 72.2 mL    13 Oct 2019 07:01  -  13 Oct 2019 21:36  --------------------------------------------------------  IN: 1151.6 mL / OUT: 550 mL / NET: 601.6 mL    ====================  NEW LABS:  ====================      10-13    x   |  x   |  x   ----------------------------<  451<HH>  x    |  x   |  x     Ca    9.4      13 Oct 2019 12:27  Phos  6.0     10-13  Mg     2.8     10-13    TPro  7.6  /  Alb  3.5  /  TBili  0.5  /  DBili  x   /  AST  153<H>  /  ALT  691<H>  /  AlkPhos  118  10-13      Creatine Kinase, Serum: 27 U/L (10-13-19 @ 16:48)    CKMB Units: 2.8 ng/mL (10-13 @ 16:48)    ABG - ( 12 Oct 2019 11:07 )  pH, Arterial: 7.46  pH, Blood: x     /  pCO2: 33    /  pO2: 101   / HCO3: 23    / Base Excess: -.3   /  SaO2: 98        ====================  PLAN:  ====================  #Cardiac  CAD  -Eventual LHC when SCr improves   -c/w DAPT and high dose statin   -trend CE, previous Trop T 520  -c/w vasopressin gtt, wean off as tolerated keep MAP >65    #Renal  MERVIN  -Trending BUN/SCr  -holding diuresis, monitor urine out put  -avoid nephrotoxic meds, renally dose    #ID   Leukocytosis  -c/w cefepime 1g q24hrs for course of 7 days completing on 10/14  -blood culture 10/8 ngtd    #Endo  Hyperglycemia without anion gap  -lantus at bedtime increased to 40U  -Pre meal coverage increased to 12U  -c/w ISS premeal and bedtime  -f/u Endo recs      Mecca Prado CCU NP  Beeper #[id number]  Spectra # [phone] ====================  CCU MIDNIGHT ROUNDS  ====================    SELVIN CLAIRE  [id number]  Patient is a 73y old  Female who presents with a chief complaint of Acute decompensated heart failure, SOB (13 Oct 2019 19:13)      ====================  SUMMARY:  72 yo F w/ PMH of HTN, HLD, DM2, GERD, CAD s/p CABG (LIMA to LAD, SVG to OM, SVG to PDA; 2014), severe MR s/p MitraClip, severe pHTN, CKD4, hypoTH c/o dyspnea x 2D suspect 2/2 NSTEMI +/- ADHF w/ e/o organ hypoperfusion. s/p RHC/IABP without LHC due to MERVIN on CKD, now weaned off, with runs of SVT overnight that received adenosine/metoprolol, now persistently hypotensive requiring vasopressin gtt.   ====================      ====================  NEW EVENTS: Had another episode of atrial tachycardia, HR increased to 136bpm, lopressor 5mg IVP x1 then 2.5mg x1 given.  Pt then converted to sinus rhythm with rate 60-70s. Remains on vasopressin @ 0.02, BP soft and unable to wean off.   ====================    MEDICATIONS  (STANDING):  aspirin enteric coated 81 milliGRAM(s) Oral daily  chlorhexidine 2% Cloths 1 Application(s) Topical <User Schedule>  clopidogrel Tablet 75 milliGRAM(s) Oral daily  dextrose 5%. 1000 milliLiter(s) (50 mL/Hr) IV Continuous <Continuous>  dextrose 50% Injectable 12.5 Gram(s) IV Push once  dextrose 50% Injectable 25 Gram(s) IV Push once  dextrose 50% Injectable 25 Gram(s) IV Push once  docusate sodium 100 milliGRAM(s) Oral two times a day  heparin  Injectable 5000 Unit(s) SubCutaneous every 12 hours  insulin glargine Injectable (LANTUS) 40 Unit(s) SubCutaneous at bedtime  insulin lispro (HumaLOG) corrective regimen sliding scale   SubCutaneous three times a day before meals  insulin lispro (HumaLOG) corrective regimen sliding scale   SubCutaneous at bedtime  insulin lispro Injectable (HumaLOG) 12 Unit(s) SubCutaneous three times a day before meals  levothyroxine 50 MICROGram(s) Oral daily  melatonin 5 milliGRAM(s) Oral at bedtime  pantoprazole    Tablet 40 milliGRAM(s) Oral before breakfast  polyethylene glycol 3350 17 Gram(s) Oral daily  senna 2 Tablet(s) Oral at bedtime  vasopressin Infusion 0.02 Unit(s)/Min (1.2 mL/Hr) IV Continuous <Continuous>    MEDICATIONS  (PRN):  ALPRAZolam 0.25 milliGRAM(s) Oral three times a day PRN Anxiety  dextrose 40% Gel 15 Gram(s) Oral once PRN Blood Glucose LESS THAN 70 milliGRAM(s)/deciliter  glucagon  Injectable 1 milliGRAM(s) IntraMuscular once PRN Glucose LESS THAN 70 milligrams/deciliter      ====================  VITALS (Last 12 hrs):  ====================    T(C): 36.3 (10-13-19 @ 19:30), Max: 36.4 (10-13-19 @ 11:30)  T(F): 97.3 (10-13-19 @ 19:30), Max: 97.6 (10-13-19 @ 11:30)  HR: 70 (10-13-19 @ 21:00) (64 - 80)  ABP: 104/44 (10-13-19 @ 21:00) (94/36 - 116/52)  ABP(mean): 66 (10-13-19 @ 21:00) (58 - 78)  RR: 19 (10-13-19 @ 21:00) (17 - 36)  SpO2: 99% (10-13-19 @ 21:00) (95% - 99%)      I&O's Summary    12 Oct 2019 07:01  -  13 Oct 2019 07:00  --------------------------------------------------------  IN: 1172.2 mL / OUT: 1100 mL / NET: 72.2 mL    13 Oct 2019 07:01  -  13 Oct 2019 21:36  --------------------------------------------------------  IN: 1151.6 mL / OUT: 550 mL / NET: 601.6 mL    ====================  NEW LABS:  ====================      10-13    x   |  x   |  x   ----------------------------<  451<HH>  x    |  x   |  x     Ca    9.4      13 Oct 2019 12:27  Phos  6.0     10-13  Mg     2.8     10-13    TPro  7.6  /  Alb  3.5  /  TBili  0.5  /  DBili  x   /  AST  153<H>  /  ALT  691<H>  /  AlkPhos  118  10-13      Creatine Kinase, Serum: 27 U/L (10-13-19 @ 16:48)    CKMB Units: 2.8 ng/mL (10-13 @ 16:48)    ABG - ( 12 Oct 2019 11:07 )  pH, Arterial: 7.46  pH, Blood: x     /  pCO2: 33    /  pO2: 101   / HCO3: 23    / Base Excess: -.3   /  SaO2: 98        ====================  PLAN:  ====================  #Cardiac  CAD  -Eventual LHC when SCr improves   -c/w DAPT and high dose statin   -trend CE, previous Trop T 520  -c/w vasopressin gtt, wean off as tolerated keep MAP >65    #Renal  MERVIN  -Trending BUN/SCr  -holding diuresis, monitor urine out put  -avoid nephrotoxic meds, renally dose    #ID   Leukocytosis  -c/w cefepime 1g q24hrs for course of 7 days completing on 10/14  -blood culture 10/8 ngtd    #Endo  Hyperglycemia without anion gap  -lantus at bedtime increased to 40U  -Pre meal coverage increased to 12U  -c/w ISS premeal and bedtime  -f/u Endo recs      Mecca Prado CCU NP  Beeper #[id number]  Spectra # [phone]

## 2019-10-13 NOTE — PROGRESS NOTE ADULT - SUBJECTIVE AND OBJECTIVE BOX
Dr. Muhammad  Office [phone]  Cell [phone]  [facility]  Cell [phone]      Patient is a 73y old  Female who presents with a chief complaint of Acute decompensated heart failure, SOB (13 Oct 2019 14:08)      Patient seen and examined at bedside. No chest pain/sob but does not feel so well    VITALS:  T(F): 97.2 (10-13-19 @ 16:00), Max: 98.5 (10-12-19 @ 19:30)  HR: 66 (10-13-19 @ 17:00)  BP: 99/64 (10-13-19 @ 07:35)  RR: 20 (10-13-19 @ 17:00)  SpO2: 97% (10-13-19 @ 17:00)  Wt(kg): --    10-12 @ 07:01  -  10-13 @ 07:00  --------------------------------------------------------  IN: 1172.2 mL / OUT: 1100 mL / NET: 72.2 mL    10-13 @ 07:01  -  10-13 @ 17:01  --------------------------------------------------------  IN: 904.4 mL / OUT: 550 mL / NET: 354.4 mL          PHYSICAL EXAM:  Constitutional: NAD  Neck: No JVD  Respiratory: CTAB, no wheezes, rales or rhonchi  Cardiovascular: S1, S2, RRR  Gastrointestinal: BS+, soft, NT/ND  Extremities: No peripheral edema    Hospital Medications:   MEDICATIONS  (STANDING):  aspirin enteric coated 81 milliGRAM(s) Oral daily  chlorhexidine 2% Cloths 1 Application(s) Topical <User Schedule>  clopidogrel Tablet 75 milliGRAM(s) Oral daily  dextrose 5%. 1000 milliLiter(s) (50 mL/Hr) IV Continuous <Continuous>  dextrose 50% Injectable 12.5 Gram(s) IV Push once  dextrose 50% Injectable 25 Gram(s) IV Push once  dextrose 50% Injectable 25 Gram(s) IV Push once  docusate sodium 100 milliGRAM(s) Oral two times a day  heparin  Injectable 5000 Unit(s) SubCutaneous every 12 hours  insulin glargine Injectable (LANTUS) 40 Unit(s) SubCutaneous at bedtime  insulin lispro (HumaLOG) corrective regimen sliding scale   SubCutaneous three times a day before meals  insulin lispro (HumaLOG) corrective regimen sliding scale   SubCutaneous at bedtime  insulin lispro Injectable (HumaLOG) 12 Unit(s) SubCutaneous three times a day before meals  levothyroxine 50 MICROGram(s) Oral daily  melatonin 5 milliGRAM(s) Oral at bedtime  pantoprazole    Tablet 40 milliGRAM(s) Oral before breakfast  polyethylene glycol 3350 17 Gram(s) Oral daily  senna 2 Tablet(s) Oral at bedtime  vasopressin Infusion 0.02 Unit(s)/Min (1.2 mL/Hr) IV Continuous <Continuous>      LABS:  10-13    128<L>  |  94<L>  |  104<H>  ----------------------------<  522<HH>  4.3   |  18<L>  |  2.36<H>    Ca    9.4      13 Oct 2019 12:27  Phos  6.0     10-13  Mg     2.8     10-13    TPro  7.6  /  Alb  3.5  /  TBili  0.5  /  DBili      /  AST  153<H>  /  ALT  691<H>  /  AlkPhos  118  10-13    Creatinine Trend: 2.36 <--, 2.52 <--, 2.64 <--, 2.96 <--, 2.99 <--, 3.17 <--, 3.21 <--, 3.01 <--, 3.05 <--, 2.90 <--, 2.86 <--, 2.92 <--, 3.04 <--, 3.08 <--, 3.17 <--, 3.26 <--, 2.73 <--, 3.00 <--    Iron Total, Serum: 44 ug/dL (10-13 @ 04:48)  Iron - Total Binding Capacity.: 424 ug/dL (10-13 @ 04:48)  Ferritin, Serum: 78 ng/mL (10-13 @ 04:46)  Albumin, Serum: 3.5 g/dL (10-13 @ 04:46)  Phosphorus Level, Serum: 6.0 mg/dL (10-13 @ 04:46)  Albumin, Serum: 3.5 g/dL (10-12 @ 23:37)  Phosphorus Level, Serum: 6.1 mg/dL (10-12 @ 23:37)                          9.7    10.85 )-----------( 184      ( 13 Oct 2019 04:46 )             29.2     Urine Studies:  Urinalysis - [10-08-19 @ 02:35]      Color Yellow / Appearance Clear / SG 1.016 / pH 5.5      Gluc Trace / Ketone Negative  / Bili Negative / Urobili Negative       Blood Negative / Protein 30 mg/dL / Leuk Est Negative / Nitrite Negative      RBC 1 / WBC 0 / Hyaline 6 / Gran 3-5 / Sq Epi  / Non Sq Epi 2 / Bacteria Negative    Urine Creatinine 20      [10-08-19 @ 20:53]  Urine Protein 20      [10-08-19 @ 23:13]  Urine Sodium 67      [10-08-19 @ 20:53]  Urine Osmolality 318      [10-08-19 @ 21:27]    Iron 44, TIBC 424, %sat 10      [10-13-19 @ 04:48]  Ferritin 78      [10-13-19 @ 04:46]  .4 (Ca --)      [04-25-19 @ 05:45]   --  Vitamin D (25OH) 25.9      [05-01-19 @ 04:00]  HbA1c 7.5      [10-08-19 @ 14:30]  TSH 1.01      [10-08-19 @ 14:47]  Lipid: chol 83, , HDL 14, LDL 46      [10-08-19 @ 14:48]        RADIOLOGY & ADDITIONAL STUDIES:

## 2019-10-13 NOTE — PROGRESS NOTE ADULT - SUBJECTIVE AND OBJECTIVE BOX
Chief complaint  Patient is a 73y old  Female who presents with a chief complaint of Acute decompensated heart failure, SOB (13 Oct 2019 07:42)   Review of systems  Patient in bed, looks comfortable, no fever, no hypoglycemia.    Labs and Fingersticks  CAPILLARY BLOOD GLUCOSE      POCT Blood Glucose.: 496 mg/dL (13 Oct 2019 13:15)  POCT Blood Glucose.: 485 mg/dL (13 Oct 2019 13:14)  POCT Blood Glucose.: 489 mg/dL (13 Oct 2019 12:03)  POCT Blood Glucose.: 473 mg/dL (13 Oct 2019 12:01)  POCT Blood Glucose.: 325 mg/dL (13 Oct 2019 08:32)  POCT Blood Glucose.: 189 mg/dL (13 Oct 2019 06:04)  POCT Blood Glucose.: 141 mg/dL (13 Oct 2019 04:58)  POCT Blood Glucose.: 131 mg/dL (13 Oct 2019 04:11)  POCT Blood Glucose.: 148 mg/dL (13 Oct 2019 03:04)  POCT Blood Glucose.: 167 mg/dL (13 Oct 2019 02:02)  POCT Blood Glucose.: 186 mg/dL (13 Oct 2019 01:07)  POCT Blood Glucose.: 225 mg/dL (12 Oct 2019 23:57)  POCT Blood Glucose.: 281 mg/dL (12 Oct 2019 23:13)  POCT Blood Glucose.: 358 mg/dL (12 Oct 2019 21:56)  POCT Blood Glucose.: 396 mg/dL (12 Oct 2019 21:03)  POCT Blood Glucose.: 440 mg/dL (12 Oct 2019 19:58)  POCT Blood Glucose.: 442 mg/dL (12 Oct 2019 19:03)  POCT Blood Glucose.: 445 mg/dL (12 Oct 2019 17:09)      Anion Gap, Serum: 16 (10-13 @ 12:27)  Anion Gap, Serum: 18 <H> (10-13 @ 04:46)  Anion Gap, Serum: 17 (10-12 @ 23:37)  Anion Gap, Serum: 18 <H> (10-12 @ 11:16)  Anion Gap, Serum: 17 (10-12 @ 03:57)      Calcium, Total Serum: 9.4 (10-13 @ 12:27)  Calcium, Total Serum: 10.0 (10-13 @ 04:46)  Calcium, Total Serum: 9.3 (10-12 @ 23:37)  Calcium, Total Serum: 9.6 (10-12 @ 11:16)  Calcium, Total Serum: 9.4 (10-12 @ 03:57)  Albumin, Serum: 3.5 (10-13 @ 04:46)  Albumin, Serum: 3.5 (10-12 @ 23:37)  Albumin, Serum: 3.1 <L> (10-12 @ 11:16)  Albumin, Serum: 3.4 (10-12 @ 03:57)    Alanine Aminotransferase (ALT/SGPT): 691 <H> (10-13 @ 04:46)  Alanine Aminotransferase (ALT/SGPT): 712 <H> (10-12 @ 23:37)  Alanine Aminotransferase (ALT/SGPT): 785 <H> (10-12 @ 11:16)  Alanine Aminotransferase (ALT/SGPT): 957 <H> (10-12 @ 03:57)  Alkaline Phosphatase, Serum: 118 (10-13 @ 04:46)  Alkaline Phosphatase, Serum: 120 (10-12 @ 23:37)  Alkaline Phosphatase, Serum: 120 (10-12 @ 11:16)  Alkaline Phosphatase, Serum: 135 <H> (10-12 @ 03:57)  Aspartate Aminotransferase (AST/SGOT): 153 <H> (10-13 @ 04:46)  Aspartate Aminotransferase (AST/SGOT): 169 <H> (10-12 @ 23:37)  Aspartate Aminotransferase (AST/SGOT): 245 <H> (10-12 @ 11:16)  Aspartate Aminotransferase (AST/SGOT): 314 <H> (10-12 @ 03:57)        10-13    128<L>  |  94<L>  |  104<H>  ----------------------------<  522<HH>  4.3   |  18<L>  |  2.36<H>    Ca    9.4      13 Oct 2019 12:27  Phos  6.0     10-13  Mg     2.8     10-13    TPro  7.6  /  Alb  3.5  /  TBili  0.5  /  DBili  x   /  AST  153<H>  /  ALT  691<H>  /  AlkPhos  118  10-13                        9.7    10.85 )-----------( 184      ( 13 Oct 2019 04:46 )             29.2     Medications  MEDICATIONS  (STANDING):  aspirin enteric coated 81 milliGRAM(s) Oral daily  chlorhexidine 2% Cloths 1 Application(s) Topical <User Schedule>  clopidogrel Tablet 75 milliGRAM(s) Oral daily  dextrose 5%. 1000 milliLiter(s) (50 mL/Hr) IV Continuous <Continuous>  dextrose 50% Injectable 12.5 Gram(s) IV Push once  dextrose 50% Injectable 25 Gram(s) IV Push once  dextrose 50% Injectable 25 Gram(s) IV Push once  docusate sodium 100 milliGRAM(s) Oral two times a day  heparin  Injectable 5000 Unit(s) SubCutaneous every 12 hours  insulin glargine Injectable (LANTUS) 40 Unit(s) SubCutaneous at bedtime  insulin lispro (HumaLOG) corrective regimen sliding scale   SubCutaneous three times a day before meals  insulin lispro (HumaLOG) corrective regimen sliding scale   SubCutaneous at bedtime  insulin lispro Injectable (HumaLOG) 12 Unit(s) SubCutaneous three times a day before meals  levothyroxine 50 MICROGram(s) Oral daily  melatonin 5 milliGRAM(s) Oral at bedtime  pantoprazole    Tablet 40 milliGRAM(s) Oral before breakfast  polyethylene glycol 3350 17 Gram(s) Oral daily  senna 2 Tablet(s) Oral at bedtime  vasopressin Infusion 0.02 Unit(s)/Min (1.2 mL/Hr) IV Continuous <Continuous>      Physical Exam  General: Patient comfortable in bed  Vital Signs Last 12 Hrs  T(F): 97.6 (10-13-19 @ 11:30), Max: 97.8 (10-13-19 @ 05:30)  HR: 70 (10-13-19 @ 14:00) (64 - 80)  BP: 99/64 (10-13-19 @ 07:35) (99/64 - 99/64)  BP(mean): 77 (10-13-19 @ 07:35) (77 - 77)  RR: 21 (10-13-19 @ 14:00) (11 - 36)  SpO2: 97% (10-13-19 @ 14:00) (94% - 100%)  Neck: No palpable thyroid nodules.  CVS: S1S2, No murmurs  Respiratory: No wheezing, no crepitations  GI: Abdomen soft, bowel sounds positive  Musculoskeletal:  edema lower extremities.   Skin: No skin rashes, no ecchymosis    Diagnostics Chief complaint  Patient is a 73y old  Female who presents with a chief complaint of Acute decompensated heart failure, SOB (13 Oct 2019 07:42)   Review of systems  Patient in bed, looks comfortable, no fever,  no hypoglycemia.    Labs and Fingersticks  CAPILLARY BLOOD GLUCOSE      POCT Blood Glucose.: 496 mg/dL (13 Oct 2019 13:15)  POCT Blood Glucose.: 485 mg/dL (13 Oct 2019 13:14)  POCT Blood Glucose.: 489 mg/dL (13 Oct 2019 12:03)  POCT Blood Glucose.: 473 mg/dL (13 Oct 2019 12:01)  POCT Blood Glucose.: 325 mg/dL (13 Oct 2019 08:32)  POCT Blood Glucose.: 189 mg/dL (13 Oct 2019 06:04)  POCT Blood Glucose.: 141 mg/dL (13 Oct 2019 04:58)  POCT Blood Glucose.: 131 mg/dL (13 Oct 2019 04:11)  POCT Blood Glucose.: 148 mg/dL (13 Oct 2019 03:04)  POCT Blood Glucose.: 167 mg/dL (13 Oct 2019 02:02)  POCT Blood Glucose.: 186 mg/dL (13 Oct 2019 01:07)  POCT Blood Glucose.: 225 mg/dL (12 Oct 2019 23:57)  POCT Blood Glucose.: 281 mg/dL (12 Oct 2019 23:13)  POCT Blood Glucose.: 358 mg/dL (12 Oct 2019 21:56)  POCT Blood Glucose.: 396 mg/dL (12 Oct 2019 21:03)  POCT Blood Glucose.: 440 mg/dL (12 Oct 2019 19:58)  POCT Blood Glucose.: 442 mg/dL (12 Oct 2019 19:03)  POCT Blood Glucose.: 445 mg/dL (12 Oct 2019 17:09)      Anion Gap, Serum: 16 (10-13 @ 12:27)  Anion Gap, Serum: 18 <H> (10-13 @ 04:46)  Anion Gap, Serum: 17 (10-12 @ 23:37)  Anion Gap, Serum: 18 <H> (10-12 @ 11:16)  Anion Gap, Serum: 17 (10-12 @ 03:57)      Calcium, Total Serum: 9.4 (10-13 @ 12:27)  Calcium, Total Serum: 10.0 (10-13 @ 04:46)  Calcium, Total Serum: 9.3 (10-12 @ 23:37)  Calcium, Total Serum: 9.6 (10-12 @ 11:16)  Calcium, Total Serum: 9.4 (10-12 @ 03:57)  Albumin, Serum: 3.5 (10-13 @ 04:46)  Albumin, Serum: 3.5 (10-12 @ 23:37)  Albumin, Serum: 3.1 <L> (10-12 @ 11:16)  Albumin, Serum: 3.4 (10-12 @ 03:57)    Alanine Aminotransferase (ALT/SGPT): 691 <H> (10-13 @ 04:46)  Alanine Aminotransferase (ALT/SGPT): 712 <H> (10-12 @ 23:37)  Alanine Aminotransferase (ALT/SGPT): 785 <H> (10-12 @ 11:16)  Alanine Aminotransferase (ALT/SGPT): 957 <H> (10-12 @ 03:57)  Alkaline Phosphatase, Serum: 118 (10-13 @ 04:46)  Alkaline Phosphatase, Serum: 120 (10-12 @ 23:37)  Alkaline Phosphatase, Serum: 120 (10-12 @ 11:16)  Alkaline Phosphatase, Serum: 135 <H> (10-12 @ 03:57)  Aspartate Aminotransferase (AST/SGOT): 153 <H> (10-13 @ 04:46)  Aspartate Aminotransferase (AST/SGOT): 169 <H> (10-12 @ 23:37)  Aspartate Aminotransferase (AST/SGOT): 245 <H> (10-12 @ 11:16)  Aspartate Aminotransferase (AST/SGOT): 314 <H> (10-12 @ 03:57)        10-13    128<L>  |  94<L>  |  104<H>  ----------------------------<  522<HH>  4.3   |  18<L>  |  2.36<H>    Ca    9.4      13 Oct 2019 12:27  Phos  6.0     10-13  Mg     2.8     10-13    TPro  7.6  /  Alb  3.5  /  TBili  0.5  /  DBili  x   /  AST  153<H>  /  ALT  691<H>  /  AlkPhos  118  10-13                        9.7    10.85 )-----------( 184      ( 13 Oct 2019 04:46 )             29.2     Medications  MEDICATIONS  (STANDING):  aspirin enteric coated 81 milliGRAM(s) Oral daily  chlorhexidine 2% Cloths 1 Application(s) Topical <User Schedule>  clopidogrel Tablet 75 milliGRAM(s) Oral daily  dextrose 5%. 1000 milliLiter(s) (50 mL/Hr) IV Continuous <Continuous>  dextrose 50% Injectable 12.5 Gram(s) IV Push once  dextrose 50% Injectable 25 Gram(s) IV Push once  dextrose 50% Injectable 25 Gram(s) IV Push once  docusate sodium 100 milliGRAM(s) Oral two times a day  heparin  Injectable 5000 Unit(s) SubCutaneous every 12 hours  insulin glargine Injectable (LANTUS) 40 Unit(s) SubCutaneous at bedtime  insulin lispro (HumaLOG) corrective regimen sliding scale   SubCutaneous three times a day before meals  insulin lispro (HumaLOG) corrective regimen sliding scale   SubCutaneous at bedtime  insulin lispro Injectable (HumaLOG) 12 Unit(s) SubCutaneous three times a day before meals  levothyroxine 50 MICROGram(s) Oral daily  melatonin 5 milliGRAM(s) Oral at bedtime  pantoprazole    Tablet 40 milliGRAM(s) Oral before breakfast  polyethylene glycol 3350 17 Gram(s) Oral daily  senna 2 Tablet(s) Oral at bedtime  vasopressin Infusion 0.02 Unit(s)/Min (1.2 mL/Hr) IV Continuous <Continuous>      Physical Exam  General: Patient comfortable in bed  Vital Signs Last 12 Hrs  T(F): 97.6 (10-13-19 @ 11:30), Max: 97.8 (10-13-19 @ 05:30)  HR: 70 (10-13-19 @ 14:00) (64 - 80)  BP: 99/64 (10-13-19 @ 07:35) (99/64 - 99/64)  BP(mean): 77 (10-13-19 @ 07:35) (77 - 77)  RR: 21 (10-13-19 @ 14:00) (11 - 36)  SpO2: 97% (10-13-19 @ 14:00) (94% - 100%)  Neck: No palpable thyroid nodules.  CVS: S1S2, No murmurs  Respiratory: No wheezing, no crepitations  GI: Abdomen soft, bowel sounds positive  Musculoskeletal:  edema lower extremities.   Skin: No skin rashes, no ecchymosis    Diagnostics

## 2019-10-13 NOTE — PROGRESS NOTE ADULT - PROBLEM SELECTOR PLAN 1
Will increase Lantus to 40u at bedtime, increase Humalog to 12u before each meal, and continue coverage scale. Will continue monitoring FS, log, and FU.  Patient counseled for compliance with consistent low carb diet. Will continue monitoring FS, log, and FU.  Patient counseled for compliance with consistent low carb diet.

## 2019-10-13 NOTE — PROGRESS NOTE ADULT - ASSESSMENT
72 yo F w/ PMH of CKD stage 4 (baseline Cr 1.9-2.2) HTN, T2DM, CAD s/p CABG X3 (2014 at [facility]), non-dilated ICM (EF 20-25%), severe mitral regurgitation s/p mitral clip (6/13/19), severe pulm HTN, hypothyroidism who presented for evaluation of chest pain, cough, and SOB for the past 2 days and was admitted for ADHF. s/p IABP 10/8/19. Nephrology is following for MERVIN    MERVIN on CKD stage 4  - baseline Cr 1.9-2.2; has had recurrent MERVIN's over the years  - CKD is likely 2/2 recurrent MERVIN's + underlying DM/HTN  - MERVIN is likely 2/2 CRS     - admitted w/ cr 3; has been slowly improving  - Bumex drip D/C'ed; can monitor off diuretic for now     - renal sonogram w/ parenchymal changes, no hydro, no stones, left simple renal cyst  - Avoid nephrotoxins including NSAIDs, IV contrast (if possible), [employer] products  - maintain MAP >65  - monitor I/O's accurately     10/10/19: Had a long conversation w/ pt's daughter, Ms. Elsa Negrete [phone], regarding renal function and need for cardiac cath. She is agreeable w/ whatever plan the cardiology/medical team decides on as she is not sure what the best option is for her mother. At this time, I explained the possibility that pt may need dialysis even if she does not have cardiac cath given severely decreased renal and cardiac function. I explained that she is at a higher risk of needing dialysis If cardiac cath is performed (especially if performed emergently w/ current MERVIN). Ms. Negrete is concerned that her mother is weak and cannot withstand the catheterization and dialysis but re-iterated to do whatever the cardiology/medical team thinks is best. I informed her that currently, there is no emergent need for cardiac cath and that the cardiology team is awaiting renal improvement prior to cardiac cath (per resident, as of this morning) and that I agree w/ this decision, however, should cardiac catheterization become urgent/emergent, that I would agree w/ catheterization and close follow up of renal function post-cath.    - according to Sandip et. al. NGHIA risk calculator, pt has a 57.3% risk of NGHIA post-cath and a 12.6% risk of needing dialysis post-cath (if 100 mL contrast is used and given her current parameters)    - no indication for renal replacement therapy today    Metabolic acidosis  - 2/2 lactic acidosis 2/2 hypoperfusion/hypotension/dec cardiac output  - lactic acidosis resolved w/ improved perfusion on IABP  - monitor    Hypotension  - likely from dec cardiac output and hypoperfusion  - hold anti-hypertensives (hydralazine, imdur, spironolactone)  - beta blocker per cardio recs  - on vasopressure    Proteinuria/Hematuria  - likely from underlying DM  - Hep C neg in 2017  - check spot urine protein/Cr, Hep B (sAg, cAb, sAb, eAg), HIV, syphilis, SPEP, UPEP, serum immunofixation, ANCA, C3 and C4 complement levels, DEAN, rheumatoid factor, cryoglobulin

## 2019-10-13 NOTE — PROGRESS NOTE ADULT - SUBJECTIVE AND OBJECTIVE BOX
Isiah Sanford M.D.  Beeper: [phone] ([facility])/ [phone] ([facility])     INTERNAL MEDICINE PROGRESS NOTE    Patient is a 73y old  Female who presents with a chief complaint of Acute decompensated heart failure, SOB (12 Oct 2019 21:42)      Overnight events:  Subjective:    Medications:  MEDICATIONS  (STANDING):  aspirin enteric coated 81 milliGRAM(s) Oral daily  cefepime   IVPB 1000 milliGRAM(s) IV Intermittent every 24 hours  chlorhexidine 2% Cloths 1 Application(s) Topical <User Schedule>  clopidogrel Tablet 75 milliGRAM(s) Oral daily  dextrose 5%. 1000 milliLiter(s) (50 mL/Hr) IV Continuous <Continuous>  dextrose 50% Injectable 12.5 Gram(s) IV Push once  dextrose 50% Injectable 25 Gram(s) IV Push once  dextrose 50% Injectable 25 Gram(s) IV Push once  docusate sodium 100 milliGRAM(s) Oral two times a day  heparin  Injectable 5000 Unit(s) SubCutaneous every 12 hours  insulin glargine Injectable (LANTUS) 30 Unit(s) SubCutaneous at bedtime  insulin lispro (HumaLOG) corrective regimen sliding scale   SubCutaneous three times a day before meals  insulin lispro (HumaLOG) corrective regimen sliding scale   SubCutaneous at bedtime  insulin lispro Injectable (HumaLOG) 8 Unit(s) SubCutaneous three times a day before meals  levothyroxine 50 MICROGram(s) Oral daily  melatonin 5 milliGRAM(s) Oral at bedtime  pantoprazole    Tablet 40 milliGRAM(s) Oral before breakfast  polyethylene glycol 3350 17 Gram(s) Oral daily  senna 2 Tablet(s) Oral at bedtime  vasopressin Infusion 0.04 Unit(s)/Min (2.4 mL/Hr) IV Continuous <Continuous>    MEDICATIONS  (PRN):  ALPRAZolam 0.25 milliGRAM(s) Oral three times a day PRN Anxiety  dextrose 40% Gel 15 Gram(s) Oral once PRN Blood Glucose LESS THAN 70 milliGRAM(s)/deciliter  glucagon  Injectable 1 milliGRAM(s) IntraMuscular once PRN Glucose LESS THAN 70 milligrams/deciliter      Objective:    Vitals: Vital Signs Last 24 Hrs  T(C): 36.6 (10-13-19 @ 05:30), Max: 36.9 (10-12-19 @ 08:00)  T(F): 97.8 (10-13-19 @ 05:30), Max: 98.5 (10-12-19 @ 19:30)  HR: 64 (10-13-19 @ 06:45) (62 - 134)  BP: 105/57 (10-12-19 @ 19:45) (86/45 - 105/57)  BP(mean): 72 (10-12-19 @ 19:45) (58 - 72)  RR: 16 (10-13-19 @ 06:45) (9 - 28)  SpO2: 98% (10-13-19 @ 06:45) (92% - 100%)              I&O's Summary    12 Oct 2019 07:01  -  13 Oct 2019 07:00  --------------------------------------------------------  IN: 1169.8 mL / OUT: 1100 mL / NET: 69.8 mL        PHYSICAL EXAM:  GENERAL: NAD, well-groomed, well-developed  EYES: EOMI, PERRLA, conjunctiva and sclera clear  ENMT: No tonsillar erythema, exudates, or enlargement; Moist mucous membranes  NECK: Supple, No JVD, Normal thyroid  CHEST/LUNG: Clear to auscultation bilaterally; No crackles, rhonchi, wheezing, or rubs  HEART: Regular rate and rhythm; No murmurs, rubs, or gallops  ABDOMEN: Soft, Nontender, Nondistended; Bowel sounds present  EXTREMITIES:  2+ Peripheral Pulses, No clubbing, cyanosis, or edema  LYMPH: No lymphadenopathy noted  SKIN: No rashes or lesions  NERVOUS SYSTEM:  Alert & Oriented X3, Good concentration; Motor Strength 5/5 B/L upper and lower extremities; DTRs 2+ intact and symmetric                                             LABS:  10-13    138  |  100  |  100<H>  ----------------------------<  145<H>  3.9   |  20<L>  |  2.52<H>  10-12    137  |  99  |  100<H>  ----------------------------<  289<H>  3.6   |  21<L>  |  2.64<H>  10-12    135  |  98  |  116<H>  ----------------------------<  387<H>  4.2   |  19<L>  |  2.96<H>    Ca    10.0      13 Oct 2019 04:46  Ca    9.3      12 Oct 2019 23:37  Ca    9.6      12 Oct 2019 11:16  Phos  6.0     10-13  Mg     2.8     10-13    TPro  7.6  /  Alb  3.5  /  TBili  0.5  /  DBili  x   /  AST  153<H>  /  ALT  691<H>  /  AlkPhos  118  10-13  TPro  7.4  /  Alb  3.5  /  TBili  0.5  /  DBili  x   /  AST  169<H>  /  ALT  712<H>  /  AlkPhos  120  10-12  TPro  6.8  /  Alb  3.1<L>  /  TBili  0.5  /  DBili  x   /  AST  245<H>  /  ALT  785<H>  /  AlkPhos  120  10-12      PTT - ( 11 Oct 2019 09:55 )  PTT:81.0 sec                    ABG - ( 12 Oct 2019 11:07 )  pH, Arterial: 7.46  pH, Blood: x     /  pCO2: 33    /  pO2: 101   / HCO3: 23    / Base Excess: -.3   /  SaO2: 98                                        9.7    10.85 )-----------( 184      ( 13 Oct 2019 04:46 )             29.2                         9.5    9.48  )-----------( 182      ( 12 Oct 2019 23:37 )             29.5                         7.3    12.89 )-----------( 184      ( 12 Oct 2019 11:16 )             23.5     CAPILLARY BLOOD GLUCOSE      POCT Blood Glucose.: 189 mg/dL (13 Oct 2019 06:04)  POCT Blood Glucose.: 141 mg/dL (13 Oct 2019 04:58)  POCT Blood Glucose.: 131 mg/dL (13 Oct 2019 04:11)  POCT Blood Glucose.: 148 mg/dL (13 Oct 2019 03:04)  POCT Blood Glucose.: 167 mg/dL (13 Oct 2019 02:02)  POCT Blood Glucose.: 186 mg/dL (13 Oct 2019 01:07)  POCT Blood Glucose.: 225 mg/dL (12 Oct 2019 23:57)  POCT Blood Glucose.: 281 mg/dL (12 Oct 2019 23:13)  POCT Blood Glucose.: 358 mg/dL (12 Oct 2019 21:56)  POCT Blood Glucose.: 396 mg/dL (12 Oct 2019 21:03)  POCT Blood Glucose.: 440 mg/dL (12 Oct 2019 19:58)  POCT Blood Glucose.: 442 mg/dL (12 Oct 2019 19:03)  POCT Blood Glucose.: 445 mg/dL (12 Oct 2019 17:09)  POCT Blood Glucose.: 331 mg/dL (12 Oct 2019 08:02)        RECENT CULTURES:  10-08 @ 14:47  .Blood  --  --  --    No growth to date.  --  10-08 @ 05:03  .Urine  --  --  --    No growth  --  10-08 @ 01:44  .Blood  --  --  --    No growth at 5 days.  --      RADIOLOGY & ADDITIONAL TESTS:    Consultants involved in case: Isiah Sanford M.D.  Beeper: [phone] ([facility])/ [phone] ([facility])     CCU PROGRESS NOTE    Patient is a 73y old  Female who presents with a chief complaint of Acute decompensated heart failure, SOB (12 Oct 2019 21:42)    Overnight events: no acute events  Subjective:    Medications:  MEDICATIONS  (STANDING):  aspirin enteric coated 81 milliGRAM(s) Oral daily  cefepime   IVPB 1000 milliGRAM(s) IV Intermittent every 24 hours  chlorhexidine 2% Cloths 1 Application(s) Topical <User Schedule>  clopidogrel Tablet 75 milliGRAM(s) Oral daily  dextrose 5%. 1000 milliLiter(s) (50 mL/Hr) IV Continuous <Continuous>  dextrose 50% Injectable 12.5 Gram(s) IV Push once  dextrose 50% Injectable 25 Gram(s) IV Push once  dextrose 50% Injectable 25 Gram(s) IV Push once  docusate sodium 100 milliGRAM(s) Oral two times a day  heparin  Injectable 5000 Unit(s) SubCutaneous every 12 hours  insulin glargine Injectable (LANTUS) 30 Unit(s) SubCutaneous at bedtime  insulin lispro (HumaLOG) corrective regimen sliding scale   SubCutaneous three times a day before meals  insulin lispro (HumaLOG) corrective regimen sliding scale   SubCutaneous at bedtime  insulin lispro Injectable (HumaLOG) 8 Unit(s) SubCutaneous three times a day before meals  levothyroxine 50 MICROGram(s) Oral daily  melatonin 5 milliGRAM(s) Oral at bedtime  pantoprazole    Tablet 40 milliGRAM(s) Oral before breakfast  polyethylene glycol 3350 17 Gram(s) Oral daily  senna 2 Tablet(s) Oral at bedtime  vasopressin Infusion 0.04 Unit(s)/Min (2.4 mL/Hr) IV Continuous <Continuous>    MEDICATIONS  (PRN):  ALPRAZolam 0.25 milliGRAM(s) Oral three times a day PRN Anxiety  dextrose 40% Gel 15 Gram(s) Oral once PRN Blood Glucose LESS THAN 70 milliGRAM(s)/deciliter  glucagon  Injectable 1 milliGRAM(s) IntraMuscular once PRN Glucose LESS THAN 70 milligrams/deciliter    Objective:    Vitals: Vital Signs Last 24 Hrs  T(C): 36.6 (10-13-19 @ 05:30), Max: 36.9 (10-12-19 @ 08:00)  T(F): 97.8 (10-13-19 @ 05:30), Max: 98.5 (10-12-19 @ 19:30)  HR: 64 (10-13-19 @ 06:45) (62 - 134)  BP: 105/57 (10-12-19 @ 19:45) (86/45 - 105/57)  BP(mean): 72 (10-12-19 @ 19:45) (58 - 72)  RR: 16 (10-13-19 @ 06:45) (9 - 28)  SpO2: 98% (10-13-19 @ 06:45) (92% - 100%)                I&O's Summary    12 Oct 2019 07:01  -  13 Oct 2019 07:00  --------------------------------------------------------  IN: 1169.8 mL / OUT: 1100 mL / NET: 69.8 mL    PHYSICAL EXAM:  GENERAL: NAD, conversant  CHEST/LUNG: Clear to auscultation bilaterally; No crackles, rhonchi, wheezing, or rubs  HEART: Regular rate and rhythm; No murmurs, rubs, or gallops  ABDOMEN: Soft, Nontender, Nondistended; Bowel sounds present  EXTREMITIES:  2+ Peripheral Pulses, No clubbing, cyanosis, or edema  SKIN: No rashes or lesions  NERVOUS SYSTEM:  Alert & Oriented, Good concentration                                                                                 LABS:  10-13    138  |  100  |  100<H>  ----------------------------<  145<H>  3.9   |  20<L>  |  2.52<H>  10-12    137  |  99  |  100<H>  ----------------------------<  289<H>  3.6   |  21<L>  |  2.64<H>  10-12    135  |  98  |  116<H>  ----------------------------<  387<H>  4.2   |  19<L>  |  2.96<H>    Ca    10.0      13 Oct 2019 04:46  Ca    9.3      12 Oct 2019 23:37  Ca    9.6      12 Oct 2019 11:16  Phos  6.0     10-13  Mg     2.8     10-13    TPro  7.6  /  Alb  3.5  /  TBili  0.5  /  DBili  x   /  AST  153<H>  /  ALT  691<H>  /  AlkPhos  118  10-13  TPro  7.4  /  Alb  3.5  /  TBili  0.5  /  DBili  x   /  AST  169<H>  /  ALT  712<H>  /  AlkPhos  120  10-12  TPro  6.8  /  Alb  3.1<L>  /  TBili  0.5  /  DBili  x   /  AST  245<H>  /  ALT  785<H>  /  AlkPhos  120  10-12      PTT - ( 11 Oct 2019 09:55 )  PTT:81.0 sec                    ABG - ( 12 Oct 2019 11:07 )  pH, Arterial: 7.46  pH, Blood: x     /  pCO2: 33    /  pO2: 101   / HCO3: 23    / Base Excess: -.3   /  SaO2: 98                   9.7    10.85 )-----------( 184      ( 13 Oct 2019 04:46 )             29.2                         9.5    9.48  )-----------( 182      ( 12 Oct 2019 23:37 )             29.5                         7.3    12.89 )-----------( 184      ( 12 Oct 2019 11:16 )             23.5     CAPILLARY BLOOD GLUCOSE  POCT Blood Glucose.: 189 mg/dL (13 Oct 2019 06:04)  POCT Blood Glucose.: 141 mg/dL (13 Oct 2019 04:58)  POCT Blood Glucose.: 131 mg/dL (13 Oct 2019 04:11)  POCT Blood Glucose.: 148 mg/dL (13 Oct 2019 03:04)  POCT Blood Glucose.: 167 mg/dL (13 Oct 2019 02:02)  POCT Blood Glucose.: 186 mg/dL (13 Oct 2019 01:07)  POCT Blood Glucose.: 225 mg/dL (12 Oct 2019 23:57)  POCT Blood Glucose.: 281 mg/dL (12 Oct 2019 23:13)    RECENT CULTURES:  10-08 @ 14:47  .Blood  No growth to date.  --  10-08 @ 05:03  .Urine  No growth  --  10-08 @ 01:44  .Blood  --  --  --    No growth at 5 days.  --    RADIOLOGY & ADDITIONAL TESTS: none new  Consultants involved in case: nephrology, endo Isiah Sanford M.D.  Beeper: [phone] ([facility])/ [phone] ([facility])     CCU PROGRESS NOTE    Patient is a 73y old  Female who presents with a chief complaint of Acute decompensated heart failure, SOB (12 Oct 2019 21:42)    Overnight events: no acute events  Subjective: NAD, was sleepy, not talking    Medications:  MEDICATIONS  (STANDING):  aspirin enteric coated 81 milliGRAM(s) Oral daily  cefepime   IVPB 1000 milliGRAM(s) IV Intermittent every 24 hours  chlorhexidine 2% Cloths 1 Application(s) Topical <User Schedule>  clopidogrel Tablet 75 milliGRAM(s) Oral daily  dextrose 5%. 1000 milliLiter(s) (50 mL/Hr) IV Continuous <Continuous>  dextrose 50% Injectable 12.5 Gram(s) IV Push once  dextrose 50% Injectable 25 Gram(s) IV Push once  dextrose 50% Injectable 25 Gram(s) IV Push once  docusate sodium 100 milliGRAM(s) Oral two times a day  heparin  Injectable 5000 Unit(s) SubCutaneous every 12 hours  insulin glargine Injectable (LANTUS) 30 Unit(s) SubCutaneous at bedtime  insulin lispro (HumaLOG) corrective regimen sliding scale   SubCutaneous three times a day before meals  insulin lispro (HumaLOG) corrective regimen sliding scale   SubCutaneous at bedtime  insulin lispro Injectable (HumaLOG) 8 Unit(s) SubCutaneous three times a day before meals  levothyroxine 50 MICROGram(s) Oral daily  melatonin 5 milliGRAM(s) Oral at bedtime  pantoprazole    Tablet 40 milliGRAM(s) Oral before breakfast  polyethylene glycol 3350 17 Gram(s) Oral daily  senna 2 Tablet(s) Oral at bedtime  vasopressin Infusion 0.04 Unit(s)/Min (2.4 mL/Hr) IV Continuous <Continuous>    MEDICATIONS  (PRN):  ALPRAZolam 0.25 milliGRAM(s) Oral three times a day PRN Anxiety  dextrose 40% Gel 15 Gram(s) Oral once PRN Blood Glucose LESS THAN 70 milliGRAM(s)/deciliter  glucagon  Injectable 1 milliGRAM(s) IntraMuscular once PRN Glucose LESS THAN 70 milligrams/deciliter    Objective:    Vitals: Vital Signs Last 24 Hrs  T(C): 36.6 (10-13-19 @ 05:30), Max: 36.9 (10-12-19 @ 08:00)  T(F): 97.8 (10-13-19 @ 05:30), Max: 98.5 (10-12-19 @ 19:30)  HR: 64 (10-13-19 @ 06:45) (62 - 134)  BP: 105/57 (10-12-19 @ 19:45) (86/45 - 105/57)  BP(mean): 72 (10-12-19 @ 19:45) (58 - 72)  RR: 16 (10-13-19 @ 06:45) (9 - 28)  SpO2: 98% (10-13-19 @ 06:45) (92% - 100%)                I&O's Summary    12 Oct 2019 07:01  -  13 Oct 2019 07:00  --------------------------------------------------------  IN: 1169.8 mL / OUT: 1100 mL / NET: 69.8 mL    PHYSICAL EXAM:  GENERAL: NAD, sleepy  CHEST/LUNG: Clear to auscultation bilaterally; No crackles, rhonchi, wheezing, or rubs  HEART: Regular rate and rhythm; No murmurs, rubs, or gallops  EXTREMITIES:  2+ Peripheral Pulses, No clubbing, cyanosis, or edema  NERVOUS SYSTEM:  awoke from sleep                                                                          LABS:  10-13    138  |  100  |  100<H>  ----------------------------<  145<H>  3.9   |  20<L>  |  2.52<H>  10-12    137  |  99  |  100<H>  ----------------------------<  289<H>  3.6   |  21<L>  |  2.64<H>  10-12    135  |  98  |  116<H>  ----------------------------<  387<H>  4.2   |  19<L>  |  2.96<H>    Ca    10.0      13 Oct 2019 04:46  Ca    9.3      12 Oct 2019 23:37  Ca    9.6      12 Oct 2019 11:16  Phos  6.0     10-13  Mg     2.8     10-13    TPro  7.6  /  Alb  3.5  /  TBili  0.5  /  DBili  x   /  AST  153<H>  /  ALT  691<H>  /  AlkPhos  118  10-13  TPro  7.4  /  Alb  3.5  /  TBili  0.5  /  DBili  x   /  AST  169<H>  /  ALT  712<H>  /  AlkPhos  120  10-12  TPro  6.8  /  Alb  3.1<L>  /  TBili  0.5  /  DBili  x   /  AST  245<H>  /  ALT  785<H>  /  AlkPhos  120  10-12      PTT - ( 11 Oct 2019 09:55 )  PTT:81.0 sec                    ABG - ( 12 Oct 2019 11:07 )  pH, Arterial: 7.46  pH, Blood: x     /  pCO2: 33    /  pO2: 101   / HCO3: 23    / Base Excess: -.3   /  SaO2: 98                   9.7    10.85 )-----------( 184      ( 13 Oct 2019 04:46 )             29.2                         9.5    9.48  )-----------( 182      ( 12 Oct 2019 23:37 )             29.5                         7.3    12.89 )-----------( 184      ( 12 Oct 2019 11:16 )             23.5     CAPILLARY BLOOD GLUCOSE  POCT Blood Glucose.: 189 mg/dL (13 Oct 2019 06:04)  POCT Blood Glucose.: 141 mg/dL (13 Oct 2019 04:58)  POCT Blood Glucose.: 131 mg/dL (13 Oct 2019 04:11)  POCT Blood Glucose.: 148 mg/dL (13 Oct 2019 03:04)  POCT Blood Glucose.: 167 mg/dL (13 Oct 2019 02:02)  POCT Blood Glucose.: 186 mg/dL (13 Oct 2019 01:07)  POCT Blood Glucose.: 225 mg/dL (12 Oct 2019 23:57)  POCT Blood Glucose.: 281 mg/dL (12 Oct 2019 23:13)    RECENT CULTURES:  10-08 @ 14:47  .Blood  No growth to date.  --  10-08 @ 05:03  .Urine  No growth  --  10-08 @ 01:44  .Blood  --  --  --    No growth at 5 days.  --    RADIOLOGY & ADDITIONAL TESTS: none new  Consultants involved in case: nephrology, endo

## 2019-10-13 NOTE — PROGRESS NOTE ADULT - ASSESSMENT
Assessment  DMT2: 73y Female with DM T2 with hyperglycemia, A1C 7.5%, was on IV insulin, FS > 400s, drip stopped this AM and basal bolus resumed, no hypoglycemic episode, appears comfortable, complains of weakness, eating meals.  SOB: on meds, FU Heart Failure team.  CAD: S/P CABG previous admission, on medications, no chest pain, stable, monitored.  Hypothyroidism: On Synthroid 50 mcg po daily, compliant with Synthroid intake, asymptomatic.  HTN: Controlled,  on antihypertensive medications.  CKD: Monitor labs/BMP,           Jillian Banks MD  Cell: [phone]  Office: [phone] Assessment  DMT2: 73y Female with DM T2 with hyperglycemia, A1C 7.5%, was on IV insulin, FS > 400s,  drip stopped this AM and basal bolus resumed, no hypoglycemic episode, appears comfortable, complains of weakness, eating meals.  SOB: on meds, FU Heart Failure team.  CAD: S/P CABG previous admission, on medications, no chest pain, stable, monitored.  Hypothyroidism: On Synthroid 50 mcg po daily, compliant with Synthroid intake, asymptomatic.  HTN: Controlled,  on antihypertensive medications.  CKD: Monitor labs/BMP,           Jillian Banks MD  Cell: [phone]  Office: [phone]

## 2019-10-13 NOTE — PROGRESS NOTE ADULT - ASSESSMENT
74 y/o F w/ PMH of HTN, HLD, DM2, GERD, CAD s/p CABG (LIMA to LAD, SVG to OM, SVG to PDA; 2014), severe MR s/p MitraClip, severe pHTN, CKD4, hypoTH c/o dyspnea x 2 days suspect 2/2  NSTEMI +/- ADHF w/ e/o organ hypoperfusion.    #CV  Vessels - Elev troponin suspect possible NSTEMI. C/w ASA/clopidogrel. S/P hep gtt. C/w vol optimize. S/p IABP to improve coronary perfusion. unlikely to undergo LHC in light of poor renal function, will cw medical mgmt for now. hypotension after increased metoprolol dose now on vasopressin. on exam perfusing well, lactate unchanged-low concern for cardiogenic shock. low concern for sepsis as cultures negative and pt on IV abx. will monitor. ordering echo. AM cortisol for tomorrow. will order 1 unit PRBCS.    Pump - Severe reduced LVEF. s/p Bumetanide gtt   Valves - S/p MitraClip. No active issues.  Rhythm - C/w monitor.    #Neuro - No active issues  #Resp - Off supplemental O2, sating well on RA. Vol optimize, as above.  #GI - Heart healthy diet. Trend liver tests, downtrending   #Endo - Trend BGFS on Lantus and Humalog. endo recs appreciated/ C/w home med Synthroid.   #Renal - Vol optimize, as above. MERVIN likely 2/2 acute congestion. continue to monitor creatinine.    #ID - Leukocytosis. Sepsis w/u unremarkable thus far- BCx and UCx NGTD; c/w Cefepime given MERVIN, to complete 7 day course on 10/14  #Hem - HSQ for DVT PPX. iron studies. will transfuse 1 unit PRBCS   #Ethics - FC. 72 y/o F w/ PMH of HTN, HLD, DM2, GERD, CAD s/p CABG (LIMA to LAD, SVG to OM, SVG to PDA; 2014), severe MR s/p MitraClip, severe pHTN, CKD4, hypoTH c/o dyspnea x 2 days suspect 2/2  NSTEMI +/- ADHF w/ e/o organ hypoperfusion.    ------------------------------------------------------------------------------------------  PLAN:    #CV  Vessels - Elev troponin suspect possible NSTEMI. C/w ASA/clopidogrel. S/P hep gtt.    S/p IABP to improve coronary perfusion.   unlikely to undergo LHC in light of poor renal function, will cw medical mgmt for now.   hypotension after increased metoprolol dose now on vasopressin. on exam perfusing well, lactate unchanged-low concern for cardiogenic shock.   low concern for sepsis as cultures negative and pt on IV abx. check AM cortisol for tomorrow. s/p 1 unit PRBCS.    Pump - Severe reduced LVEF. s/p Bumetanide gtt   Valves - S/p MitraClip. No active issues.  Rhythm - on tele. s/p SVT with aberrancy last week which responded to adenosine, o/n 10/12 with SVT but was given metoprolol --> if recurs, assess BP --> adenosine vs beta blocker    #Neuro - No active issues  #Resp - Off supplemental O2, sating well on RA. Vol optimize, as above.  #GI - Heart healthy diet. Trend liver tests, downtrending   #Endo - Trend BGFS on Lantus and Humalog. endo recs appreciated - high blg glc > 400 today 10/13 --> additional insulin given and endocrine aware  C/w home med Synthroid.   #Renal - Vol optimize, as above. MERVIN likely 2/2 prerenal from heart strain. continue to monitor creatinine - downtrending  #ID - Leukocytosis resolving. Sepsis w/u unremarkable thus far- BCx and UCx NGTD; c/w Cefepime given MERVIN, to complete 7 day course on 10/14  #Hem - HSQ for DVT PPX. iron studies. s/p 1 unit PRBCS for Hb < 8 --> responded appropriately to > 9  #Ethics - FC.

## 2019-10-14 PROBLEM — I25.10 CAD (CORONARY ARTERY DISEASE): Status: ACTIVE | Noted: 2019-05-24

## 2019-10-14 PROBLEM — I34.0 MITRAL REGURGITATION: Status: ACTIVE | Noted: 2019-05-24

## 2019-10-14 PROBLEM — Z98.890 S/P MITRAL VALVE CLIP IMPLANTATION: Status: ACTIVE | Noted: 2019-07-01

## 2019-10-14 PROBLEM — N28.9 ACUTE ON CHRONIC RENAL INSUFFICIENCY: Status: ACTIVE | Noted: 2019-10-14

## 2019-10-14 PROBLEM — I50.23 ACUTE ON CHRONIC SYSTOLIC CONGESTIVE HEART FAILURE: Status: ACTIVE | Noted: 2019-03-08

## 2019-10-14 LAB
ALBUMIN SERPL ELPH-MCNC: 3.6 G/DL — SIGNIFICANT CHANGE UP (ref 3.3–5)
ALP SERPL-CCNC: 105 U/L — SIGNIFICANT CHANGE UP (ref 40–120)
ALT FLD-CCNC: 502 U/L — HIGH (ref 10–45)
ANION GAP SERPL CALC-SCNC: 18 MMOL/L — HIGH (ref 5–17)
AST SERPL-CCNC: 84 U/L — HIGH (ref 10–40)
BILIRUB SERPL-MCNC: 0.4 MG/DL — SIGNIFICANT CHANGE UP (ref 0.2–1.2)
BUN SERPL-MCNC: 104 MG/DL — HIGH (ref 7–23)
C3 SERPL-MCNC: 111 MG/DL — SIGNIFICANT CHANGE UP (ref 81–157)
C4 SERPL-MCNC: 39 MG/DL — SIGNIFICANT CHANGE UP (ref 13–39)
CALCIUM SERPL-MCNC: 9.4 MG/DL — SIGNIFICANT CHANGE UP (ref 8.4–10.5)
CHLORIDE SERPL-SCNC: 98 MMOL/L — SIGNIFICANT CHANGE UP (ref 96–108)
CK MB CFR SERPL CALC: 2.8 NG/ML — SIGNIFICANT CHANGE UP (ref 0–3.8)
CK MB CFR SERPL CALC: 3.1 NG/ML — SIGNIFICANT CHANGE UP (ref 0–3.8)
CK SERPL-CCNC: 22 U/L — LOW (ref 25–170)
CK SERPL-CCNC: 22 U/L — LOW (ref 25–170)
CO2 SERPL-SCNC: 20 MMOL/L — LOW (ref 22–31)
CORTIS AM PEAK SERPL-MCNC: 25.5 UG/DL — HIGH (ref 6–18.4)
CREAT SERPL-MCNC: 2.29 MG/DL — HIGH (ref 0.5–1.3)
GLUCOSE SERPL-MCNC: 385 MG/DL — HIGH (ref 70–99)
HBV SURFACE AB SER-ACNC: SIGNIFICANT CHANGE UP
HBV SURFACE AG SER-ACNC: SIGNIFICANT CHANGE UP
HCT VFR BLD CALC: 27.8 % — LOW (ref 34.5–45)
HCV AB S/CO SERPL IA: 0.13 S/CO — SIGNIFICANT CHANGE UP (ref 0–0.99)
HCV AB SERPL-IMP: SIGNIFICANT CHANGE UP
HGB BLD-MCNC: 9.1 G/DL — LOW (ref 11.5–15.5)
HIV 1+2 AB+HIV1 P24 AG SERPL QL IA: SIGNIFICANT CHANGE UP
LACTATE SERPL-SCNC: 1.8 MMOL/L — SIGNIFICANT CHANGE UP (ref 0.7–2)
MAGNESIUM SERPL-MCNC: 2.8 MG/DL — HIGH (ref 1.6–2.6)
MCHC RBC-ENTMCNC: 27.7 PG — SIGNIFICANT CHANGE UP (ref 27–34)
MCHC RBC-ENTMCNC: 32.7 GM/DL — SIGNIFICANT CHANGE UP (ref 32–36)
MCV RBC AUTO: 84.5 FL — SIGNIFICANT CHANGE UP (ref 80–100)
NRBC # BLD: 0 /100 WBCS — SIGNIFICANT CHANGE UP (ref 0–0)
PHOSPHATE SERPL-MCNC: 5.3 MG/DL — HIGH (ref 2.5–4.5)
PLATELET # BLD AUTO: 166 K/UL — SIGNIFICANT CHANGE UP (ref 150–400)
POTASSIUM SERPL-MCNC: 4 MMOL/L — SIGNIFICANT CHANGE UP (ref 3.5–5.3)
POTASSIUM SERPL-SCNC: 4 MMOL/L — SIGNIFICANT CHANGE UP (ref 3.5–5.3)
PROT SERPL-MCNC: 6.9 G/DL — SIGNIFICANT CHANGE UP (ref 6–8.3)
PROT SERPL-MCNC: 6.9 G/DL — SIGNIFICANT CHANGE UP (ref 6–8.3)
PROT SERPL-MCNC: 7.5 G/DL — SIGNIFICANT CHANGE UP (ref 6–8.3)
RBC # BLD: 3.29 M/UL — LOW (ref 3.8–5.2)
RBC # FLD: 17.7 % — HIGH (ref 10.3–14.5)
RHEUMATOID FACT SERPL-ACNC: <10 IU/ML — SIGNIFICANT CHANGE UP (ref 0–13)
SODIUM SERPL-SCNC: 136 MMOL/L — SIGNIFICANT CHANGE UP (ref 135–145)
TROPONIN T, HIGH SENSITIVITY RESULT: 456 NG/L — HIGH (ref 0–51)
TROPONIN T, HIGH SENSITIVITY RESULT: 522 NG/L — HIGH (ref 0–51)
WBC # BLD: 10.86 K/UL — HIGH (ref 3.8–10.5)
WBC # FLD AUTO: 10.86 K/UL — HIGH (ref 3.8–10.5)

## 2019-10-14 PROCEDURE — 93010 ELECTROCARDIOGRAM REPORT: CPT

## 2019-10-14 PROCEDURE — 99223 1ST HOSP IP/OBS HIGH 75: CPT

## 2019-10-14 PROCEDURE — 99233 SBSQ HOSP IP/OBS HIGH 50: CPT

## 2019-10-14 RX ORDER — METOPROLOL TARTRATE 50 MG
12.5 TABLET ORAL
Refills: 0 | Status: DISCONTINUED | OUTPATIENT
Start: 2019-10-14 | End: 2019-10-18

## 2019-10-14 RX ORDER — METOPROLOL TARTRATE 50 MG
5 TABLET ORAL ONCE
Refills: 0 | Status: COMPLETED | OUTPATIENT
Start: 2019-10-14 | End: 2019-10-14

## 2019-10-14 RX ORDER — INSULIN GLARGINE 100 [IU]/ML
60 INJECTION, SOLUTION SUBCUTANEOUS AT BEDTIME
Refills: 0 | Status: DISCONTINUED | OUTPATIENT
Start: 2019-10-14 | End: 2019-10-15

## 2019-10-14 RX ORDER — INSULIN LISPRO 100/ML
18 VIAL (ML) SUBCUTANEOUS
Refills: 0 | Status: DISCONTINUED | OUTPATIENT
Start: 2019-10-14 | End: 2019-10-15

## 2019-10-14 RX ORDER — VASOPRESSIN 20 [USP'U]/ML
0.02 INJECTION INTRAVENOUS
Qty: 50 | Refills: 0 | Status: DISCONTINUED | OUTPATIENT
Start: 2019-10-14 | End: 2019-10-14

## 2019-10-14 RX ORDER — METOPROLOL TARTRATE 50 MG
2.5 TABLET ORAL ONCE
Refills: 0 | Status: COMPLETED | OUTPATIENT
Start: 2019-10-14 | End: 2019-10-14

## 2019-10-14 RX ORDER — PIOGLITAZONE HYDROCHLORIDE 15 MG/1
30 TABLET ORAL DAILY
Refills: 0 | Status: DISCONTINUED | OUTPATIENT
Start: 2019-10-14 | End: 2019-10-14

## 2019-10-14 RX ORDER — MIDODRINE HYDROCHLORIDE 2.5 MG/1
10 TABLET ORAL EVERY 8 HOURS
Refills: 0 | Status: DISCONTINUED | OUTPATIENT
Start: 2019-10-14 | End: 2019-10-18

## 2019-10-14 RX ORDER — INSULIN GLARGINE 100 [IU]/ML
50 INJECTION, SOLUTION SUBCUTANEOUS AT BEDTIME
Refills: 0 | Status: DISCONTINUED | OUTPATIENT
Start: 2019-10-14 | End: 2019-10-14

## 2019-10-14 RX ORDER — METOPROLOL TARTRATE 50 MG
12.5 TABLET ORAL
Refills: 0 | Status: DISCONTINUED | OUTPATIENT
Start: 2019-10-14 | End: 2019-10-14

## 2019-10-14 RX ADMIN — Medication 12.5 MILLIGRAM(S): at 22:05

## 2019-10-14 RX ADMIN — INSULIN GLARGINE 60 UNIT(S): 100 INJECTION, SOLUTION SUBCUTANEOUS at 22:05

## 2019-10-14 RX ADMIN — Medication 5: at 08:08

## 2019-10-14 RX ADMIN — Medication 0.25 MILLIGRAM(S): at 14:06

## 2019-10-14 RX ADMIN — VASOPRESSIN 1.2 UNIT(S)/MIN: 20 INJECTION INTRAVENOUS at 07:19

## 2019-10-14 RX ADMIN — POLYETHYLENE GLYCOL 3350 17 GRAM(S): 17 POWDER, FOR SOLUTION ORAL at 12:13

## 2019-10-14 RX ADMIN — Medication 5 MILLIGRAM(S): at 01:53

## 2019-10-14 RX ADMIN — Medication 18 UNIT(S): at 17:31

## 2019-10-14 RX ADMIN — CEFEPIME 100 MILLIGRAM(S): 1 INJECTION, POWDER, FOR SOLUTION INTRAMUSCULAR; INTRAVENOUS at 09:03

## 2019-10-14 RX ADMIN — Medication 12 UNIT(S): at 12:20

## 2019-10-14 RX ADMIN — Medication 50 MICROGRAM(S): at 05:58

## 2019-10-14 RX ADMIN — Medication 81 MILLIGRAM(S): at 12:12

## 2019-10-14 RX ADMIN — ATORVASTATIN CALCIUM 80 MILLIGRAM(S): 80 TABLET, FILM COATED ORAL at 22:05

## 2019-10-14 RX ADMIN — Medication 6: at 12:20

## 2019-10-14 RX ADMIN — CHLORHEXIDINE GLUCONATE 1 APPLICATION(S): 213 SOLUTION TOPICAL at 05:58

## 2019-10-14 RX ADMIN — MIDODRINE HYDROCHLORIDE 10 MILLIGRAM(S): 2.5 TABLET ORAL at 13:04

## 2019-10-14 RX ADMIN — HEPARIN SODIUM 5000 UNIT(S): 5000 INJECTION INTRAVENOUS; SUBCUTANEOUS at 05:57

## 2019-10-14 RX ADMIN — MIDODRINE HYDROCHLORIDE 10 MILLIGRAM(S): 2.5 TABLET ORAL at 22:27

## 2019-10-14 RX ADMIN — Medication 100 MILLIGRAM(S): at 17:31

## 2019-10-14 RX ADMIN — HEPARIN SODIUM 5000 UNIT(S): 5000 INJECTION INTRAVENOUS; SUBCUTANEOUS at 17:30

## 2019-10-14 RX ADMIN — CLOPIDOGREL BISULFATE 75 MILLIGRAM(S): 75 TABLET, FILM COATED ORAL at 12:12

## 2019-10-14 RX ADMIN — Medication 3: at 22:26

## 2019-10-14 RX ADMIN — VASOPRESSIN 1.2 UNIT(S)/MIN: 20 INJECTION INTRAVENOUS at 05:58

## 2019-10-14 RX ADMIN — PANTOPRAZOLE SODIUM 40 MILLIGRAM(S): 20 TABLET, DELAYED RELEASE ORAL at 05:58

## 2019-10-14 RX ADMIN — Medication 5: at 17:31

## 2019-10-14 RX ADMIN — Medication 2.5 MILLIGRAM(S): at 02:20

## 2019-10-14 RX ADMIN — Medication 5 MILLIGRAM(S): at 22:06

## 2019-10-14 RX ADMIN — Medication 100 MILLIGRAM(S): at 05:59

## 2019-10-14 RX ADMIN — Medication 12 UNIT(S): at 08:08

## 2019-10-14 RX ADMIN — SENNA PLUS 2 TABLET(S): 8.6 TABLET ORAL at 22:05

## 2019-10-14 NOTE — PROGRESS NOTE ADULT - PROBLEM SELECTOR PLAN 1
Will increase Lantus to 18 units at bed time.  Will increase Humalog to 60 units before each meal in addition to Humalog correction scale coverage.  Case discussed with CCU team and nurse  Patient counseled for compliance with consistent low carb diet.

## 2019-10-14 NOTE — PROGRESS NOTE ADULT - SUBJECTIVE AND OBJECTIVE BOX
Interval Events:     Subjective:     Physical:  ICU Vital Signs Last 24 Hrs  T(C): 37 (14 Oct 2019 16:00), Max: 37 (14 Oct 2019 16:00)  T(F): 98.6 (14 Oct 2019 16:00), Max: 98.6 (14 Oct 2019 16:00)  HR: 80 (14 Oct 2019 21:00) (62 - 136)  BP: 95/57 (14 Oct 2019 08:00) (95/57 - 95/57)  BP(mean): 74 (14 Oct 2019 08:00) (74 - 74)  ABP: 104/46 (14 Oct 2019 21:00) (86/36 - 130/56)  ABP(mean): 70 (14 Oct 2019 21:00) (54 - 84)  RR: 28 (14 Oct 2019 21:00) (14 - 33)  SpO2: 98% (14 Oct 2019 21:00) (94% - 100%)      PHYSICAL EXAM:    Constitutional: NAD. well-developed; well-groomed; well-nourished;  HEENT: AT/NC, PERRLA; EOMI, MMM, no oropharyngeal lesions, no erythema, no exudates, Supple neck; normal thyroid gland, no cervical lymphadenopathy  Respiratory: CTAB. equal aeration bilaterally. no wheezing, no crackes, no rhonchi. no increase in WOB  Cardiovascular: RRR, no M/R/G. 2+ distal pulses. Cap refill < 2 seconds. no JVD  Gastrointestinal: soft; NT/ND, +BS, no rebounding tenderness / guarding / HSM / mass / ascites.  Genitourinary: not examined.  Extremities: no clubbing; no cyanosis; no pedal edema, non-tender to palpation, DP and Radial pulses intact.  Skin: warm and dry; color normal: no rash: no ulcers  Neurological: A&Ox 3; responds to pain; responds to verbal commands; epicritic and protopathic sensation intact: CN nerves grossly intact.   MSK/Back: no deformities. Active and passive ROM intact; strength intact, no CVA tenderness, No joint tenderness, swelling, erythema  Psychiatric: normal mood/affect. Denies SI/HI    LABS:  CAPILLARY BLOOD GLUCOSE      POCT Blood Glucose.: 381 mg/dL (14 Oct 2019 21:59)  POCT Blood Glucose.: 387 mg/dL (14 Oct 2019 17:06)  POCT Blood Glucose.: 418 mg/dL (14 Oct 2019 12:17)  POCT Blood Glucose.: 428 mg/dL (14 Oct 2019 11:01)  POCT Blood Glucose.: 414 mg/dL (14 Oct 2019 10:59)  POCT Blood Glucose.: 383 mg/dL (14 Oct 2019 08:06)    I&O's Summary    13 Oct 2019 07:01  -  14 Oct 2019 07:00  --------------------------------------------------------  IN: 1281.2 mL / OUT: 825 mL / NET: 456.2 mL    14 Oct 2019 07:01  -  14 Oct 2019 22:24  --------------------------------------------------------  IN: 1013.6 mL / OUT: 650 mL / NET: 363.6 mL                              9.1    10.86 )-----------( 166      ( 14 Oct 2019 05:50 )             27.8     WBC Trend: 10.86<--, 10.85<--, 9.48<--  10-14    136  |  98  |  104<H>  ----------------------------<  385<H>  4.0   |  20<L>  |  2.29<H>    Ca    9.4      14 Oct 2019 05:51  Phos  5.3     10-14  Mg     2.8     10-14    TPro  6.9  /  Alb  x   /  TBili  x   /  DBili  x   /  AST  x   /  ALT  x   /  AlkPhos  x   10-14    Creatinine Trend: 2.29<--, 2.36<--, 2.52<--, 2.64<--, 2.96<--, 2.99<--    CARDIAC MARKERS ( 14 Oct 2019 05:51 )  x     / x     / 22 U/L / x     / 3.1 ng/mL  CARDIAC MARKERS ( 13 Oct 2019 23:46 )  x     / x     / 22 U/L / x     / 2.8 ng/mL  CARDIAC MARKERS ( 13 Oct 2019 16:48 )  x     / x     / 27 U/L / x     / 2.8 ng/mL            Telemetry:     EKG:    Imaging:          MEDICATIONS  (STANDING):  aspirin enteric coated 81 milliGRAM(s) Oral daily  atorvastatin 80 milliGRAM(s) Oral at bedtime  cefepime   IVPB 1000 milliGRAM(s) IV Intermittent every 24 hours  chlorhexidine 2% Cloths 1 Application(s) Topical <User Schedule>  clopidogrel Tablet 75 milliGRAM(s) Oral daily  dextrose 5%. 1000 milliLiter(s) (50 mL/Hr) IV Continuous <Continuous>  dextrose 50% Injectable 12.5 Gram(s) IV Push once  dextrose 50% Injectable 25 Gram(s) IV Push once  dextrose 50% Injectable 25 Gram(s) IV Push once  docusate sodium 100 milliGRAM(s) Oral two times a day  heparin  Injectable 5000 Unit(s) SubCutaneous every 12 hours  insulin glargine Injectable (LANTUS) 60 Unit(s) SubCutaneous at bedtime  insulin lispro (HumaLOG) corrective regimen sliding scale   SubCutaneous three times a day before meals  insulin lispro (HumaLOG) corrective regimen sliding scale   SubCutaneous at bedtime  insulin lispro Injectable (HumaLOG) 18 Unit(s) SubCutaneous three times a day before meals  levothyroxine 50 MICROGram(s) Oral daily  melatonin 5 milliGRAM(s) Oral at bedtime  metoprolol tartrate 12.5 milliGRAM(s) Oral two times a day  midodrine 10 milliGRAM(s) Oral every 8 hours  pantoprazole    Tablet 40 milliGRAM(s) Oral before breakfast  polyethylene glycol 3350 17 Gram(s) Oral daily  senna 2 Tablet(s) Oral at bedtime    MEDICATIONS  (PRN):  ALPRAZolam 0.25 milliGRAM(s) Oral three times a day PRN Anxiety  dextrose 40% Gel 15 Gram(s) Oral once PRN Blood Glucose LESS THAN 70 milliGRAM(s)/deciliter  glucagon  Injectable 1 milliGRAM(s) IntraMuscular once PRN Glucose LESS THAN 70 milligrams/deciliter      Consult: Interval Events:   No acute interval events  Subjectively she is doing same as this AM. Denies any CP, palpitation, or SOB.      Physical:  ICU Vital Signs Last 24 Hrs  T(C): 37 (14 Oct 2019 16:00), Max: 37 (14 Oct 2019 16:00)  T(F): 98.6 (14 Oct 2019 16:00), Max: 98.6 (14 Oct 2019 16:00)  HR: 80 (14 Oct 2019 21:00) (62 - 136)  BP: 95/57 (14 Oct 2019 08:00) (95/57 - 95/57)  BP(mean): 74 (14 Oct 2019 08:00) (74 - 74)  ABP: 104/46 (14 Oct 2019 21:00) (86/36 - 130/56)  ABP(mean): 70 (14 Oct 2019 21:00) (54 - 84)  RR: 28 (14 Oct 2019 21:00) (14 - 33)  SpO2: 98% (14 Oct 2019 21:00) (94% - 100%)      PHYSICAL EXAM:    Constitutional: NAD. elderly female lying in bed, sleeping comfortably upon entry.  HEENT: AT/NC, PERRLA; EOMI, MMM, Supple neck;   Respiratory: CTAB. equal aeration bilaterally. no increase in WOB  Cardiovascular: RRR, no M/R/G. 2+ distal pulses.   Gastrointestinal: soft; NT/ND, +BS  Extremities: no cyanosis; no pedal edema, non-tender to palpation, DP and Radial pulses intact.  Skin: warm and dry; color normal: no rash: no ulcers  Neurological: A&Ox 3; responds to pain; responds to verbal commands; epicritic and protopathic sensation intact: CN nerves grossly intact.   Psychiatric: normal mood/affect.    LABS:  CAPILLARY BLOOD GLUCOSE      POCT Blood Glucose.: 381 mg/dL (14 Oct 2019 21:59)  POCT Blood Glucose.: 387 mg/dL (14 Oct 2019 17:06)  POCT Blood Glucose.: 418 mg/dL (14 Oct 2019 12:17)  POCT Blood Glucose.: 428 mg/dL (14 Oct 2019 11:01)  POCT Blood Glucose.: 414 mg/dL (14 Oct 2019 10:59)  POCT Blood Glucose.: 383 mg/dL (14 Oct 2019 08:06)    I&O's Summary    13 Oct 2019 07:01  -  14 Oct 2019 07:00  --------------------------------------------------------  IN: 1281.2 mL / OUT: 825 mL / NET: 456.2 mL    14 Oct 2019 07:01  -  14 Oct 2019 22:24  --------------------------------------------------------  IN: 1013.6 mL / OUT: 650 mL / NET: 363.6 mL                              9.1    10.86 )-----------( 166      ( 14 Oct 2019 05:50 )             27.8     WBC Trend: 10.86<--, 10.85<--, 9.48<--  10-14    136  |  98  |  104<H>  ----------------------------<  385<H>  4.0   |  20<L>  |  2.29<H>    Ca    9.4      14 Oct 2019 05:51  Phos  5.3     10-14  Mg     2.8     10-14    TPro  6.9  /  Alb  x   /  TBili  x   /  DBili  x   /  AST  x   /  ALT  x   /  AlkPhos  x   10-14    Creatinine Trend: 2.29<--, 2.36<--, 2.52<--, 2.64<--, 2.96<--, 2.99<--    CARDIAC MARKERS ( 14 Oct 2019 05:51 )  x     / x     / 22 U/L / x     / 3.1 ng/mL  CARDIAC MARKERS ( 13 Oct 2019 23:46 )  x     / x     / 22 U/L / x     / 2.8 ng/mL  CARDIAC MARKERS ( 13 Oct 2019 16:48 )  x     / x     / 27 U/L / x     / 2.8 ng/mL            Telemetry: NSR with PVCs.     EKG: no new EKG since AM    Imaging: no new imaging since AM            MEDICATIONS  (STANDING):  aspirin enteric coated 81 milliGRAM(s) Oral daily  atorvastatin 80 milliGRAM(s) Oral at bedtime  cefepime   IVPB 1000 milliGRAM(s) IV Intermittent every 24 hours  chlorhexidine 2% Cloths 1 Application(s) Topical <User Schedule>  clopidogrel Tablet 75 milliGRAM(s) Oral daily  dextrose 5%. 1000 milliLiter(s) (50 mL/Hr) IV Continuous <Continuous>  dextrose 50% Injectable 12.5 Gram(s) IV Push once  dextrose 50% Injectable 25 Gram(s) IV Push once  dextrose 50% Injectable 25 Gram(s) IV Push once  docusate sodium 100 milliGRAM(s) Oral two times a day  heparin  Injectable 5000 Unit(s) SubCutaneous every 12 hours  insulin glargine Injectable (LANTUS) 60 Unit(s) SubCutaneous at bedtime  insulin lispro (HumaLOG) corrective regimen sliding scale   SubCutaneous three times a day before meals  insulin lispro (HumaLOG) corrective regimen sliding scale   SubCutaneous at bedtime  insulin lispro Injectable (HumaLOG) 18 Unit(s) SubCutaneous three times a day before meals  levothyroxine 50 MICROGram(s) Oral daily  melatonin 5 milliGRAM(s) Oral at bedtime  metoprolol tartrate 12.5 milliGRAM(s) Oral two times a day  midodrine 10 milliGRAM(s) Oral every 8 hours  pantoprazole    Tablet 40 milliGRAM(s) Oral before breakfast  polyethylene glycol 3350 17 Gram(s) Oral daily  senna 2 Tablet(s) Oral at bedtime    MEDICATIONS  (PRN):  ALPRAZolam 0.25 milliGRAM(s) Oral three times a day PRN Anxiety  dextrose 40% Gel 15 Gram(s) Oral once PRN Blood Glucose LESS THAN 70 milliGRAM(s)/deciliter  glucagon  Injectable 1 milliGRAM(s) IntraMuscular once PRN Glucose LESS THAN 70 milligrams/deciliter      Consult:

## 2019-10-14 NOTE — HISTORY OF PRESENT ILLNESS
[FreeTextEntry1] : Ms. Gamino is a 72 yo woman with PMHx of HTN, T2DM (A1c 7.4%), CAD s/p CABG (LIMA to LAD, SVG to OM, SVG to PDA 2014 at [facility]), non-dilated ICM (EF 20-25%), severe mitral regurgitation s/p mitral clip (6/13/19 Dr. Newman), severe pulm HTN, CKD (b/l Cr 1.8-2), and hypothyroidism, who is here for follow up post prolonged hospitalization. \par \par She was most recently hospitalized at [facility] from 7/5-9/5 for ADHF. Of note, she was discharged shortly prior to this hospitalization on 6/19 after having mitral clip procedure completed. Reportedly she was feeling well upon discharge, however, she gradually developed worsening exertional dyspnea prompting her to return to the hospital. Initially, she required BiPAP with improvement in her respiratory status following diuresis. She was initiated on vasopressors and inotropes in the CCU, which were subsequently weaned off. Infectious work up was negative. During her hospitalization she was transfused a unit PRBCs for anemia without and reported active bleeding. Additionally her digoxin, which she had been on for history of SVT, was discontinued in the setting of an elevated level of 2.8. She was last seen inpatient by HF on 7/25 at which time she was symptomatically unchanged and continued to have evidence of elevated filling pressures. Palliative care was consulted and discussions regarding GOC with Ms. Gamino's daughter and she were to continue with aggressive treatment at this time. She was discharged home on bumex 3mg BID, uzma 25mg daily, hydralazine 50mg TID, ISDN 30 TID, and Toprol 25mg daily, at a weight on 9/5 of 118lbs. On the day of discharge her labs showed a Hgb 9, Na 138, K 3.7, CO2 18, BUN/Cr 100/2.16.\par \par Since discharge, she has been seen by her cardiologist Dr. Johny Sotelo, who decreased her hydralazine to 25mg TID 2/2 hypotension and increased her bumex to 4mg BID several days prior to her visit with us. She reports her weight at home initially upon discharge was 112lbs which increased to 116lbs over ~2 weeks and has remained stable there. She reports increased UOP on increased dose of bumex, however denies decrease in weight. She reports feeling very fatigued and endorses dyspnea with minimal exertion such as taking only a few steps, orthopnea, PND, fair appetite with worsening abdominal distention and mild LE edema.

## 2019-10-14 NOTE — CONSULT NOTE ADULT - ASSESSMENT
Patient is a 74 yo woman with a PMHx of HTN, HLD, T2DM, GERD, CAD s/p CABG (LIMA to LAD, SVG to OM, SVG to PDA 2014 at [facility]), HFrEF (25-30%), severe mitral regurgitation s/p mitral clip, severe pulmonary HTN, CKD IV, hypothyroidism presenting on 10/8 with 2 day history of SOB. Admitted to CCU for ADHF/NSTEMI management. 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 Restart as BP tolerates  -Continue telemetry monitoring  -Monitor and replete lytes PRN  -Can give adenosine vs. IV lopressor if SVT recurs  -Will discuss with Dr. Mayo. Full note to follow    Jose Sampson MD  Cardiology Fellow  Call or Text: [phone]  All cardiology service information can be found 24/7 on [email or web address], password: [email or web address] Patient is a 72 yo woman with a PMHx of HTN, HLD, T2DM, GERD, CAD s/p CABG (LIMA to LAD, SVG to OM, SVG to PDA 2014 at [facility]), HFrEF (25-30%), severe mitral regurgitation s/p mitral clip, severe pulmonary HTN, CKD IV, hypothyroidism presenting on 10/8 with 2 day history of SOB. Admitted to CCU for ADHF/NSTEMI management. EP consulted for pSVT    #AVNRT  -Start metoprolol tartrate 12.5 mg BID as BP tolerates  -Continue telemetry monitoring  -Monitor and replete lytes PRN  -Can give adenosine or try vagal maneuvers if SVT recurs      Jose Sampson MD  Cardiology Fellow  Call or Text: [phone]  All cardiology service information can be found 24/7 on [email or web address], password: [email or web address]

## 2019-10-14 NOTE — PROGRESS NOTE ADULT - ASSESSMENT
72 y/o F w/ PMH of HTN, HLD, DM2, GERD, CAD s/p CABG (LIMA to LAD, SVG to OM, SVG to PDA; 2014), severe MR s/p MitraClip, severe pHTN, CKD4, hypoTH c/o dyspnea x 2 days suspect 2/2  NSTEMI +/- ADHF w/ e/o organ hypoperfusion.    #CV  Vessels - Elev troponin suspect possible NSTEMI. C/w ASA/clopidogrel. S/P hep gtt.    S/p IABP to improve coronary perfusion.   unlikely to undergo LHC in light of poor renal function, will cw medical mgmt for now.   hypotension after increased metoprolol dose now on vasopressin. on exam perfusing well, lactate unchanged-low concern for cardiogenic shock.   low concern for sepsis as cultures negative and pt on IV abx. check AM cortisol for tomorrow. s/p 1 unit PRBCS.    Pump - Severe reduced LVEF. s/p Bumetanide gtt   Valves - S/p MitraClip. No active issues.  Rhythm - on tele. s/p SVT with aberrancy last week which responded to adenosine, o/n 10/12 with SVT but was given metoprolol --> if recurs, assess BP --> adenosine vs beta blocker    #Neuro - No active issues  #Resp - Off supplemental O2, sating well on RA. Vol optimize, as above.  #GI - Heart healthy diet. Trend liver tests, downtrending   #Endo - Trend BGFS on Lantus and Humalog. endo recs appreciated - high blg glc > 400 today 10/13 --> additional insulin given and endocrine aware  C/w home med Synthroid.   #Renal - Vol optimize, as above. MERVIN likely 2/2 prerenal from heart strain. continue to monitor creatinine - downtrending  #ID - Leukocytosis resolving. Sepsis w/u unremarkable thus far- BCx and UCx NGTD; c/w Cefepime given MERVIN, to complete 7 day course on 10/14  #Hem - HSQ for DVT PPX. iron studies. s/p 1 unit PRBCS for Hb < 8 --> responded appropriately to > 9  #Ethics - FC. 72 y/o F w/ PMH of HTN, HLD, DM2, GERD, CAD s/p CABG (LIMA to LAD, SVG to OM, SVG to PDA; 2014), severe MR s/p MitraClip, severe pHTN, CKD4, hypoTH c/o dyspnea x 2 days suspect 2/2  NSTEMI +/- ADHF w/ e/o organ hypoperfusion.    #CV  Vessels - Elev troponin suspect possible NSTEMI. C/w ASA/clopidogrel. S/P hep gtt.    S/p IABP to improve coronary perfusion.   unlikely to undergo LHC in light of poor renal function, will cw medical mgmt.   hypotension remains on vasopressin since 10/12. on exam perfusing well, lactate improved. low concern for cardiogenic shock.   low concern for sepsis as cultures negative and pt on IV abx. =AM cortisol normal. s/p 1 unit PRBCS with good response.   Pump - Severe reduced LVEF. s/p Bumetanide gtt   Valves - S/p MitraClip. No active issues.  Rhythm - on tele. s/p SVT with aberrancy last week which responded to adenosine, o/n 10/12 with SVT but was given metoprolol --> if recurs, assess BP --> adenosine vs beta blocker    #Neuro - No active issues  #Resp - Off supplemental O2, sating well on RA. Vol optimize, as above.  #GI - Heart healthy diet. Trend liver tests, downtrending   #Endo - Trend BGFS on Lantus and Humalog. endo recs appreciated - high blg glc > 400 today 10/13 --> additional insulin given and endocrine aware  C/w home med Synthroid.   #Renal - Vol optimize, as above. MERVIN likely 2/2 prerenal from heart strain. continue to monitor creatinine - downtrending  #ID - Leukocytosis resolving. Sepsis w/u unremarkable thus far- BCx and UCx NGTD; c/w Cefepime given MERVIN, to complete 7 day course on 10/14  #Hem - HSQ for DVT PPX. iron studies. s/p 1 unit PRBCS for Hb < 8 --> responded appropriately to > 9  #Ethics - FC. 74 y/o F w/ PMH of HTN, HLD, DM2, GERD, CAD s/p CABG (LIMA to LAD, SVG to OM, SVG to PDA; 2014), severe MR s/p MitraClip, severe pHTN, CKD4, hypoTH c/o dyspnea x 2 days suspect 2/2  NSTEMI +/- ADHF w/ e/o organ hypoperfusion.    #CV  Vessels - Elev troponin, possible NSTEMI. S/P hep gtt.  S/p IABP to improve coronary perfusion.   not a candidate for LHC in light of poor renal function, will cw medical mgmt. C/w ASA/clopidogrel.   Pump - Severe reduced LVEF. s/p Bumetanide gtt. hypotension requiring vasopressin since 10/12--weaning today. on exam perfusing well, lactate improved. low concern for cardiogenic shock.   low concern for sepsis as cultures negative and pt on IV abx. AM cortisol normal. s/p 1 unit PRBCS with good response.   Valves - S/p MitraClip. No active issues.  Rhythm - on tele. s/p SVT with aberrancy last week which responded to adenosine, o/n 10/13 with SVT but was given metoprolol --> if recurs, assess BP --> adenosine vs beta blocker. will consult EP.    #Neuro - No active issues  #Resp - Off supplemental O2, sating well on RA. Vol optimize, as above.  #GI - Heart healthy diet. Trend liver tests, downtrending   #Endo - Trend BGFS on Lantus and Humalog. endo recs appreciated - high blg glc > 400 today 10/13 --> additional insulin given and endocrine aware. increasing Lantus today.   C/w home med Synthroid.   #Renal - Vol optimize, as above. MERVIN likely 2/2 prerenal from heart strain. continue to monitor creatinine - downtrending  #ID - Leukocytosis resolving. Sepsis w/u unremarkable thus far- BCx and UCx NGTD; c/w Cefepime given MERVIN, to complete 7 day course on 10/14.  #Hem - HSQ for DVT PPX. iron studies showing normal ferritin/iron binding capacity- ?CHRISTOPHE. normal hemolysis labs. s/p 1 unit PRBCS for Hb < 8 --> responded appropriately to > 9  #Ethics - FC. 74 y/o F w/ PMH of HTN, HLD, DM2, GERD, CAD s/p CABG (LIMA to LAD, SVG to OM, SVG to PDA; 2014), severe MR s/p MitraClip, severe pHTN, CKD4, hypoTH c/o dyspnea x 2 days suspect 2/2  NSTEMI +/- ADHF w/ e/o organ hypoperfusion.    #CV  Vessels - Elev troponin, possible NSTEMI. trops downtrending S/P hep gtt.  S/p IABP to improve coronary perfusion. per HF team not a candidate for LHC in light of poor renal function, will cw medical mgmt. C/w ASA/clopidogrel, statin  Pump - Severe reduced LVEF. s/p Bumetanide gtt. hypotension requiring vasopressin since 10/12--weaning today. on exam perfusing well, lactate improved. low concern for cardiogenic shock.   low concern for sepsis as cultures negative and pt on IV abx. AM cortisol normal. s/p 1 unit PRBCS with good response.   Valves - S/p MitraClip. No active issues.  Rhythm - on tele. s/p SVT with aberrancy last week which responded to adenosine, o/n 10/13 with SVT but was given metoprolol --> if recurs, assess BP --> adenosine vs beta blocker. will consult EP.    #Neuro - No active issues  #Resp - Off supplemental O2, sating well on RA. Vol optimize, as above.  #GI - Heart healthy diet. Trend liver tests, downtrending   #Endo - Trend BGFS on Lantus and Humalog. endo recs appreciated - high blg glc > 400 today 10/13 --> additional insulin given and endocrine aware. increasing Lantus today.   C/w home med Synthroid.   #Renal - Vol optimize, as above. MERVIN likely 2/2 prerenal from heart strain. continue to monitor creatinine - downtrending  #ID - Leukocytosis resolving. Sepsis w/u unremarkable thus far- BCx and UCx NGTD; c/w Cefepime given MERVIN, to complete 7 day course on 10/14.  #Hem - HSQ for DVT PPX. Anemia- iron studies showing normal ferritin/iron binding capacity- ?CHRISTOPHE. normal hemolysis labs. s/p 1 unit PRBCS for Hb < 8 --> responded appropriately to > 9  #Ethics - FC. 74 y/o F w/ PMH of HTN, HLD, DM2, GERD, CAD s/p CABG (LIMA to LAD, SVG to OM, SVG to PDA; 2014), severe MR s/p MitraClip, severe pHTN, CKD4, hypoTH c/o dyspnea x 2 days suspect 2/2  NSTEMI +/- ADHF w/ e/o organ hypoperfusion.    #CV  Vessels - Elev troponin, possible NSTEMI. trops downtrending S/P hep gtt.  S/p IABP to improve coronary perfusion. per HF team not a candidate for LHC in light of poor renal function, will cw medical mgmt. C/w ASA/clopidogrel, statin  Pump - Severe reduced LVEF. s/p Bumetanide gtt. hypotension requiring vasopressin since 10/12--weaning today to midodrine 10 mg Q8h. on exam perfusing well, lactate improved. low concern for cardiogenic shock.   low concern for sepsis as cultures negative and pt on IV abx. AM cortisol normal. s/p 1 unit PRBCS with good response.   Valves - S/p MitraClip. No active issues.  Rhythm - on tele. s/p SVT with aberrancy last week which responded to adenosine, o/n 10/13 with SVT but was given metoprolol --> if recurs, assess BP --> adenosine vs beta blocker. will consult EP.    #Neuro - No active issues  #Resp - Off supplemental O2, sating well on RA. Vol optimize, as above.  #GI - Heart healthy diet. Trend liver tests, downtrending   #Endo - Trend BGFS on Lantus and Humalog. endo recs appreciated - high blg glc > 400 today 10/13 --> additional insulin given and endocrine aware. increasing Lantus today.   C/w home med Synthroid.   #Renal - Vol optimize, as above. MERVIN likely 2/2 prerenal from heart strain. continue to monitor creatinine - downtrending  #ID - Leukocytosis resolving. Sepsis w/u unremarkable thus far- BCx and UCx NGTD; c/w Cefepime given MERVIN, to complete 7 day course on 10/14.  #Hem - HSQ for DVT PPX. Anemia- iron studies showing normal ferritin/iron binding capacity- ?CHRISTOPHE. normal hemolysis labs. s/p 1 unit PRBCS for Hb < 8 --> responded appropriately to > 9  #Ethics - FC.

## 2019-10-14 NOTE — PROGRESS NOTE ADULT - ASSESSMENT
74 yo F w/ PMH of CKD stage 4 (baseline Cr 1.9-2.2) HTN, T2DM, CAD s/p CABG X3 (2014 at [facility]), non-dilated ICM (EF 20-25%), severe mitral regurgitation s/p mitral clip (6/13/19), severe pulm HTN, hypothyroidism who presented for evaluation of chest pain, cough, and SOB for the past 2 days and was admitted for ADHF. s/p IABP 10/8/19. Nephrology is following for MERVIN    MERVIN on CKD stage 4  - baseline Cr 1.9-2.2; has had recurrent MERVIN's over the years  - CKD is likely 2/2 recurrent MERVIN's + underlying DM/HTN  - MERVIN is likely 2/2 CRS     - admitted w/ cr 3; has been slowly improving  - monitor off diuretic for now     - renal sonogram w/ parenchymal changes, no hydro, no stones, left simple renal cyst  - Avoid nephrotoxins including NSAIDs, IV contrast (if possible), [employer] products  - maintain MAP >65, currently on vasopressin drip  - monitor I/O's accurately    - according to Sandip et. al. NGHIA risk calculator, pt has a 57.3% risk of NGHIA post-cath and a 12.6% risk of needing dialysis post-cath (if 100 mL contrast is used and given her current parameters)    - no indication for renal replacement therapy today    - since renal function is improving, LHC is less risky at this time. However, since she has underlying renal disease, she is still higher risk for NGHIA/needing dialysis post-cath. This was explained to pt's daughter previously (see below)    10/10/19: Had a long conversation w/ pt's daughter, Ms. Elsa Negrete [phone], regarding renal function and need for cardiac cath. She is agreeable w/ whatever plan the cardiology/medical team decides on as she is not sure what the best option is for her mother. At this time, I explained the possibility that pt may need dialysis even if she does not have cardiac cath given severely decreased renal and cardiac function. I explained that she is at a higher risk of needing dialysis If cardiac cath is performed (especially if performed emergently w/ current MERVIN). Ms. Negrete is concerned that her mother is weak and cannot withstand the catheterization and dialysis but re-iterated to do whatever the cardiology/medical team thinks is best. I informed her that currently, there is no emergent need for cardiac cath and that the cardiology team is awaiting renal improvement prior to cardiac cath (per resident, as of this morning) and that I agree w/ this decision, however, should cardiac catheterization become urgent/emergent, that I would agree w/ catheterization and close follow up of renal function post-cath.    Metabolic acidosis  - 2/2 lactic acidosis 2/2 hypoperfusion/hypotension/dec cardiac output  - lactic acidosis resolved  - monitor    Hypotension  - likely from dec cardiac output and hypoperfusion  - hold anti-hypertensives (hydralazine, imdur, spironolactone)  - beta blocker per cardio recs  - on vasopressor  - recommend midodrine to maintain MAP >65 (if ok w/ cardio team)    Proteinuria/Hematuria  - likely from underlying DM  - Hep C neg in 2017  - check spot urine protein/Cr, Hep B (sAg, cAb, sAb, eAg), HIV, syphilis, SPEP, UPEP, serum immunofixation, ANCA, C3 and C4 complement levels, DEAN, rheumatoid factor, cryoglobulin 72 yo F w/ PMH of CKD stage 4 (baseline Cr 1.9-2.2) HTN, T2DM, CAD s/p CABG X3 (2014 at [facility]), non-dilated ICM (EF 20-25%), severe mitral regurgitation s/p mitral clip (6/13/19), severe pulm HTN, hypothyroidism who presented for evaluation of chest pain, cough, and SOB for the past 2 days and was admitted for ADHF. s/p IABP 10/8/19. Nephrology is following for MERVIN    MERVIN on CKD stage 4  - baseline Cr 1.9-2.2; has had recurrent MERVIN's over the years  - CKD is likely 2/2 recurrent MERVIN's + underlying DM/HTN  - MERVIN is likely 2/2 CRS     - admitted w/ cr 3; has been slowly improving  - monitor off diuretic for now     - renal sonogram w/ parenchymal changes, no hydro, no stones, left simple renal cyst  - Avoid nephrotoxins including NSAIDs, IV contrast (if possible), [employer] products  - maintain MAP >65, currently on vasopressin drip  - monitor I/O's accurately    - according to Sandip et. al. NGHIA risk calculator, pt has a 57.3% risk of NGHIA post-cath and a 12.6% risk of needing dialysis post-cath (if 100 mL contrast is used and given her current parameters)    - no indication for renal replacement therapy today    - since renal function is improving, LHC is less risky at this time. However, since she has underlying renal disease, she is still higher risk for NGHIA/needing dialysis post-cath. This was explained to pt's daughter previously (see below)    10/10/19: Had a long conversation w/ pt's daughter, Ms. Elsa Negrete [phone], regarding renal function and need for cardiac cath. She is agreeable w/ whatever plan the cardiology/medical team decides on as she is not sure what the best option is for her mother. At this time, I explained the possibility that pt may need dialysis even if she does not have cardiac cath given severely decreased renal and cardiac function. I explained that she is at a higher risk of needing dialysis If cardiac cath is performed (especially if performed emergently w/ current MERVIN). Ms. Negrete is concerned that her mother is weak and cannot withstand the catheterization and dialysis but re-iterated to do whatever the cardiology/medical team thinks is best. I informed her that currently, there is no emergent need for cardiac cath and that the cardiology team is awaiting renal improvement prior to cardiac cath (per resident, as of this morning) and that I agree w/ this decision, however, should cardiac catheterization become urgent/emergent, that I would agree w/ catheterization and close follow up of renal function post-cath.    Metabolic acidosis  - 2/2 lactic acidosis 2/2 hypoperfusion/hypotension/dec cardiac output  - lactic acidosis resolved  - monitor    Hypotension  - likely from dec cardiac output and hypoperfusion  - hold anti-hypertensives (hydralazine, imdur, spironolactone)  - beta blocker per cardio recs  - on vasopressor  - recommend midodrine to maintain MAP >65 (if ok w/ cardio team)    Proteinuria/Hematuria  - likely from underlying DM  - Hep B, Hep C, HIV RF neg  - check spot urine protein/Cr, syphilis, SPEP, UPEP, serum immunofixation, ANCA, C3 and C4 complement levels, DEAN, cryoglobulin

## 2019-10-14 NOTE — PROGRESS NOTE ADULT - SUBJECTIVE AND OBJECTIVE BOX
[facility]   MD Vanita Dasilva D.O. Fatima Sheikh, D.O. Ruoro Wong, PA    From 7 AM - 5 PM:  OFFICE: [phone]  Dr. Muhammad cell: [phone]  Dr. Bowers cell: [phone]  Dr. Soria cell: [phone]  RASHIDA Smith cell: [phone]    From 5 PM - 7 AM: Answering Service: [phone]        RENAL FOLLOW UP NOTE  --------------------------------------------------------------------------------  HPI: Pt seen and examined. Has no complaints this AM. Denies any CP or SOB currently. Mild cough. No fever per pt.        PAST HISTORY  --------------------------------------------------------------------------------  No significant changes to PMH, PSH, FHx, SHx, unless otherwise noted    ALLERGIES & MEDICATIONS  --------------------------------------------------------------------------------  Allergies    azithromycin (Hives; Pruritus)    Intolerances      Standing Inpatient Medications  aspirin enteric coated 81 milliGRAM(s) Oral daily  atorvastatin 80 milliGRAM(s) Oral at bedtime  cefepime   IVPB 1000 milliGRAM(s) IV Intermittent every 24 hours  chlorhexidine 2% Cloths 1 Application(s) Topical <User Schedule>  clopidogrel Tablet 75 milliGRAM(s) Oral daily  dextrose 5%. 1000 milliLiter(s) IV Continuous <Continuous>  dextrose 50% Injectable 12.5 Gram(s) IV Push once  dextrose 50% Injectable 25 Gram(s) IV Push once  dextrose 50% Injectable 25 Gram(s) IV Push once  docusate sodium 100 milliGRAM(s) Oral two times a day  heparin  Injectable 5000 Unit(s) SubCutaneous every 12 hours  insulin glargine Injectable (LANTUS) 60 Unit(s) SubCutaneous at bedtime  insulin lispro (HumaLOG) corrective regimen sliding scale   SubCutaneous three times a day before meals  insulin lispro (HumaLOG) corrective regimen sliding scale   SubCutaneous at bedtime  insulin lispro Injectable (HumaLOG) 18 Unit(s) SubCutaneous three times a day before meals  levothyroxine 50 MICROGram(s) Oral daily  melatonin 5 milliGRAM(s) Oral at bedtime  midodrine 10 milliGRAM(s) Oral every 8 hours  pantoprazole    Tablet 40 milliGRAM(s) Oral before breakfast  polyethylene glycol 3350 17 Gram(s) Oral daily  senna 2 Tablet(s) Oral at bedtime  vasopressin Infusion 0.02 Unit(s)/Min IV Continuous <Continuous>    PRN Inpatient Medications  ALPRAZolam 0.25 milliGRAM(s) Oral three times a day PRN  dextrose 40% Gel 15 Gram(s) Oral once PRN  glucagon  Injectable 1 milliGRAM(s) IntraMuscular once PRN      REVIEW OF SYSTEMS  --------------------------------------------------------------------------------  General: no fever  CVS: no chest pain  RESP: no sob, no cough  ABD: no abdominal pain  : no dysuria,  MSK: no edema     VITALS/PHYSICAL EXAM  --------------------------------------------------------------------------------  T(C): 37 (10-14-19 @ 16:00), Max: 37 (10-14-19 @ 16:00)  HR: 74 (10-14-19 @ 16:00) (62 - 136)  BP: 95/57 (10-14-19 @ 08:00) (95/57 - 95/57)  RR: 23 (10-14-19 @ 16:00) (14 - 33)  SpO2: 96% (10-14-19 @ 16:00) (94% - 100%)  Wt(kg): --        10-13-19 @ 07:01  -  10-14-19 @ 07:00  --------------------------------------------------------  IN: 1281.2 mL / OUT: 825 mL / NET: 456.2 mL    10-14-19 @ 07:01  -  10-14-19 @ 16:18  --------------------------------------------------------  IN: 533.6 mL / OUT: 650 mL / NET: -116.4 mL      Physical Exam:  	Gen: NAD  	HEENT: MMM  	Pulm: CTA B/L  	CV: S1S2, + murmur  	Abd: Soft, +BS  	Ext: No LE edema B/L              Neuro: Awake   	Skin: Warm and Dry   	    LABS/STUDIES  --------------------------------------------------------------------------------              9.1    10.86 >-----------<  166      [10-14-19 @ 05:50]              27.8     136  |  98  |  104  ----------------------------<  385      [10-14-19 @ 05:51]  4.0   |  20  |  2.29        Ca     9.4     [10-14-19 @ 05:51]      Mg     2.8     [10-14-19 @ 05:51]      Phos  5.3     [10-14-19 @ 05:51]    TPro  6.9  /  Alb  x   /  TBili  x   /  DBili  x   /  AST  x   /  ALT  x   /  AlkPhos  x   [10-14-19 @ 09:35]        CK 22      [10-14-19 @ 05:51]        [10-12-19 @ 23:37]    Creatinine Trend:  SCr 2.29 [10-14 @ 05:51]  SCr 2.36 [10-13 @ 12:27]  SCr 2.52 [10-13 @ 04:46]  SCr 2.64 [10-12 @ 23:37]  SCr 2.96 [10-12 @ 11:16]    Urinalysis - [10-08-19 @ 02:35]      Color Yellow / Appearance Clear / SG 1.016 / pH 5.5      Gluc Trace / Ketone Negative  / Bili Negative / Urobili Negative       Blood Negative / Protein 30 mg/dL / Leuk Est Negative / Nitrite Negative      RBC 1 / WBC 0 / Hyaline 6 / Gran 3-5 / Sq Epi  / Non Sq Epi 2 / Bacteria Negative    Urine Creatinine 20      [10-08-19 @ 20:53]  Urine Protein 20      [10-08-19 @ 23:13]  Urine Sodium 67      [10-08-19 @ 20:53]  Urine Osmolality 318      [10-08-19 @ 21:27]    Iron 44, TIBC 424, %sat 10      [10-13-19 @ 04:48]  Ferritin 78      [10-13-19 @ 04:46]  .4 (Ca --)      [04-25-19 @ 05:45]   --  Vitamin D (25OH) 25.9      [05-01-19 @ 04:00]  HbA1c 7.5      [10-08-19 @ 14:30]  TSH 1.01      [10-08-19 @ 14:47]  Lipid: chol 83, , HDL 14, LDL 46      [10-08-19 @ 14:48]    HBsAb Nonreact      [10-14-19 @ 09:34]  HBsAg Nonreact      [10-14-19 @ 09:34]  HCV 0.13, Nonreact      [10-14-19 @ 09:34]  HIV Nonreact      [10-14-19 @ 09:23]    C3 Complement 111      [10-14-19 @ 09:35]  C4 Complement 39      [10-14-19 @ 09:35]  Rheumatoid Factor <10      [10-14-19 @ 09:30]

## 2019-10-14 NOTE — PROGRESS NOTE ADULT - ASSESSMENT
Assessment  DMT2: 73y Female with DM T2 with hyperglycemia, A1C 7.5%, blood sugars still high, on large dose basal bolus insulin, no hypoglycemic episode, complains of weakness, eating meals.  SOB: on meds, FU Heart Failure team.  CAD: S/P CABG previous admission, on medications, no chest pain, stable, monitored.  Hypothyroidism: On Synthroid 50 mcg po daily, compliant with Synthroid intake, asymptomatic.  HTN: Controlled,  on antihypertensive medications.  CKD: Monitor labs/BMP,           Jillian Banks MD  Cell: [phone]  Office: [phone]

## 2019-10-14 NOTE — REASON FOR VISIT
[Follow-Up - From Hospitalization] : follow-up of a recent hospitalization for [Heart Failure] : congestive heart failure [Discharge Date: ___] : Discharge Date: [unfilled] [Admitted for Heart Failure] : patient was admitted for heart failure [Family Member] : family member [Other: _____] : [unfilled]

## 2019-10-14 NOTE — PROGRESS NOTE ADULT - SUBJECTIVE AND OBJECTIVE BOX
PATIENT:  SELVIN CLAIRE  [id number]    CHIEF COMPLAINT:  Patient is a 73y old  Female who presents with a chief complaint of Acute decompensated heart failure, SOB (13 Oct 2019 21:36)      INTERVAL HISTORY/OVERNIGHT EVENTS:      REVIEW OF SYSTEMS:    Constitutional:     [ ] negative [ ] fevers [ ] chills [ ] weight loss [ ] weight gain  HEENT:                  [ ] negative [ ] dry eyes [ ] eye irritation [ ] postnasal drip [ ] nasal congestion  CV:                         [ ] negative  [ ] chest pain [ ] orthopnea [ ] palpitations [ ] murmur  Resp:                     [ ] negative [ ] cough [ ] shortness of breath [ ] dyspnea [ ] wheezing [ ] sputum [ ] hemoptysis  GI:                          [ ] negative [ ] nausea [ ] vomiting [ ] diarrhea [ ] constipation [ ] abd pain [ ] dysphagia   :                        [ ] negative [ ] dysuria [ ] nocturia [ ] hematuria [ ] increased urinary frequency  Musculoskeletal: [ ] negative [ ] back pain [ ] myalgias [ ] arthralgias [ ] fracture  Skin:                       [ ] negative [ ] rash [ ] itch  Neurological:        [ ] negative [ ] headache [ ] dizziness [ ] syncope [ ] weakness [ ] numbness  Psychiatric:           [ ] negative [ ] anxiety [ ] depression  Endocrine:            [ ] negative [ ] diabetes [ ] thyroid problem  Heme/Lymph:      [ ] negative [ ] anemia [ ] bleeding problem  Allergic/Immune: [ ] negative [ ] itchy eyes [ ] nasal discharge [ ] hives [ ] angioedema    [ ] All other systems negative  [ ] Unable to assess ROS because ________.    MEDICATIONS:  MEDICATIONS  (STANDING):  aspirin enteric coated 81 milliGRAM(s) Oral daily  atorvastatin 80 milliGRAM(s) Oral at bedtime  cefepime   IVPB 1000 milliGRAM(s) IV Intermittent every 24 hours  chlorhexidine 2% Cloths 1 Application(s) Topical <User Schedule>  clopidogrel Tablet 75 milliGRAM(s) Oral daily  dextrose 5%. 1000 milliLiter(s) (50 mL/Hr) IV Continuous <Continuous>  dextrose 50% Injectable 12.5 Gram(s) IV Push once  dextrose 50% Injectable 25 Gram(s) IV Push once  dextrose 50% Injectable 25 Gram(s) IV Push once  docusate sodium 100 milliGRAM(s) Oral two times a day  heparin  Injectable 5000 Unit(s) SubCutaneous every 12 hours  insulin glargine Injectable (LANTUS) 40 Unit(s) SubCutaneous at bedtime  insulin lispro (HumaLOG) corrective regimen sliding scale   SubCutaneous three times a day before meals  insulin lispro (HumaLOG) corrective regimen sliding scale   SubCutaneous at bedtime  insulin lispro Injectable (HumaLOG) 12 Unit(s) SubCutaneous three times a day before meals  levothyroxine 50 MICROGram(s) Oral daily  melatonin 5 milliGRAM(s) Oral at bedtime  pantoprazole    Tablet 40 milliGRAM(s) Oral before breakfast  polyethylene glycol 3350 17 Gram(s) Oral daily  senna 2 Tablet(s) Oral at bedtime  vasopressin Infusion 0.02 Unit(s)/Min (1.2 mL/Hr) IV Continuous <Continuous>    MEDICATIONS  (PRN):  ALPRAZolam 0.25 milliGRAM(s) Oral three times a day PRN Anxiety  dextrose 40% Gel 15 Gram(s) Oral once PRN Blood Glucose LESS THAN 70 milliGRAM(s)/deciliter  glucagon  Injectable 1 milliGRAM(s) IntraMuscular once PRN Glucose LESS THAN 70 milligrams/deciliter      ALLERGIES:  Allergies    azithromycin (Hives; Pruritus)    Intolerances        OBJECTIVE:  ICU Vital Signs Last 24 Hrs  T(C): 36.7 (14 Oct 2019 06:00), Max: 36.7 (14 Oct 2019 06:00)  T(F): 98 (14 Oct 2019 06:00), Max: 98 (14 Oct 2019 06:00)  HR: 64 (14 Oct 2019 07:00) (64 - 136)  BP: 99/64 (13 Oct 2019 07:35) (99/64 - 99/64)  BP(mean): 77 (13 Oct 2019 07:35) (77 - 77)  ABP: 98/46 (14 Oct 2019 07:00) (86/36 - 116/52)  ABP(mean): 66 (14 Oct 2019 07:00) (56 - 78)  RR: 23 (14 Oct 2019 07:00) (11 - 36)  SpO2: 98% (14 Oct 2019 07:00) (94% - 100%)      Adult Advanced Hemodynamics Last 24 Hrs  CVP(mm Hg): --  CVP(cm H2O): --  CO: --  CI: --  PA: --  PA(mean): --  PCWP: --  SVR: --  SVRI: --  PVR: --  PVRI: --  CAPILLARY BLOOD GLUCOSE      POCT Blood Glucose.: 377 mg/dL (13 Oct 2019 20:58)  POCT Blood Glucose.: 436 mg/dL (13 Oct 2019 16:37)  POCT Blood Glucose.: 424 mg/dL (13 Oct 2019 16:36)  POCT Blood Glucose.: 496 mg/dL (13 Oct 2019 13:15)  POCT Blood Glucose.: 485 mg/dL (13 Oct 2019 13:14)  POCT Blood Glucose.: 489 mg/dL (13 Oct 2019 12:03)  POCT Blood Glucose.: 473 mg/dL (13 Oct 2019 12:01)  POCT Blood Glucose.: 325 mg/dL (13 Oct 2019 08:32)    CAPILLARY BLOOD GLUCOSE      POCT Blood Glucose.: 377 mg/dL (13 Oct 2019 20:58)    I&O's Summary    13 Oct 2019 07:01  -  14 Oct 2019 07:00  --------------------------------------------------------  IN: 1280 mL / OUT: 825 mL / NET: 455 mL      Daily     Daily Weight in k.7 (14 Oct 2019 06:00)    PHYSICAL EXAMINATION:  General: WN/WD NAD  HEENT: PERRLA, EOMI, moist mucous membranes  Neurology: A&Ox3, nonfocal, CUADRA x 4  Respiratory: CTA B/L, normal respiratory effort, no wheezes, crackles, rales  CV: RRR, S1S2, no murmurs, rubs or gallops  Abdominal: Soft, NT, ND +BS, Last BM  Extremities: No edema, + peripheral pulses  Incisions:   Tubes:    LABS:  ABG - ( 12 Oct 2019 11:07 )  pH, Arterial: 7.46  pH, Blood: x     /  pCO2: 33    /  pO2: 101   / HCO3: 23    / Base Excess: -.3   /  SaO2: 98                                      9.1    10.86 )-----------( 166      ( 14 Oct 2019 05:50 )             27.8     10-14    136  |  98  |  104<H>  ----------------------------<  385<H>  4.0   |  20<L>  |  2.29<H>    Ca    9.4      14 Oct 2019 05:51  Phos  5.3     10-14  Mg     2.8     10-14    TPro  7.5  /  Alb  3.6  /  TBili  0.4  /  DBili  x   /  AST  84<H>  /  ALT  502<H>  /  AlkPhos  105  10-14    LIVER FUNCTIONS - ( 14 Oct 2019 05:51 )  Alb: 3.6 g/dL / Pro: 7.5 g/dL / ALK PHOS: 105 U/L / ALT: 502 U/L / AST: 84 U/L / GGT: x             Creatine Kinase, Serum: 22 U/L (10-14 @ 05:51)  CKMB Units: 2.8 ng/mL (10-13 @ 23:46)  Creatine Kinase, Serum: 22 U/L (10-13 @ 23:46)  Creatine Kinase, Serum: 27 U/L (10-13 @ 16:48)  CKMB Units: 2.8 ng/mL (10-13 @ 16:48)    CARDIAC MARKERS ( 14 Oct 2019 05:51 )  x     / x     / 22 U/L / x     / x      CARDIAC MARKERS ( 13 Oct 2019 23:46 )  x     / x     / 22 U/L / x     / 2.8 ng/mL  CARDIAC MARKERS ( 13 Oct 2019 16:48 )  x     / x     / 27 U/L / x     / 2.8 ng/mL          TELEMETRY:     EKG:     IMAGING: PATIENT:  SELVIN CLAIRE  [id number]    CHIEF COMPLAINT:  Patient is a 73y old  Female who presents with a chief complaint of Acute decompensated heart failure, SOB (13 Oct 2019 21:36)      INTERVAL HISTORY/OVERNIGHT EVENTS: No acute events overnight.       MEDICATIONS:  MEDICATIONS  (STANDING):  aspirin enteric coated 81 milliGRAM(s) Oral daily  atorvastatin 80 milliGRAM(s) Oral at bedtime  cefepime   IVPB 1000 milliGRAM(s) IV Intermittent every 24 hours  chlorhexidine 2% Cloths 1 Application(s) Topical <User Schedule>  clopidogrel Tablet 75 milliGRAM(s) Oral daily  dextrose 5%. 1000 milliLiter(s) (50 mL/Hr) IV Continuous <Continuous>  dextrose 50% Injectable 12.5 Gram(s) IV Push once  dextrose 50% Injectable 25 Gram(s) IV Push once  dextrose 50% Injectable 25 Gram(s) IV Push once  docusate sodium 100 milliGRAM(s) Oral two times a day  heparin  Injectable 5000 Unit(s) SubCutaneous every 12 hours  insulin glargine Injectable (LANTUS) 40 Unit(s) SubCutaneous at bedtime  insulin lispro (HumaLOG) corrective regimen sliding scale   SubCutaneous three times a day before meals  insulin lispro (HumaLOG) corrective regimen sliding scale   SubCutaneous at bedtime  insulin lispro Injectable (HumaLOG) 12 Unit(s) SubCutaneous three times a day before meals  levothyroxine 50 MICROGram(s) Oral daily  melatonin 5 milliGRAM(s) Oral at bedtime  pantoprazole    Tablet 40 milliGRAM(s) Oral before breakfast  polyethylene glycol 3350 17 Gram(s) Oral daily  senna 2 Tablet(s) Oral at bedtime  vasopressin Infusion 0.02 Unit(s)/Min (1.2 mL/Hr) IV Continuous <Continuous>    MEDICATIONS  (PRN):  ALPRAZolam 0.25 milliGRAM(s) Oral three times a day PRN Anxiety  dextrose 40% Gel 15 Gram(s) Oral once PRN Blood Glucose LESS THAN 70 milliGRAM(s)/deciliter  glucagon  Injectable 1 milliGRAM(s) IntraMuscular once PRN Glucose LESS THAN 70 milligrams/deciliter      ALLERGIES:  Allergies    azithromycin (Hives; Pruritus)    Intolerances        OBJECTIVE:  ICU Vital Signs Last 24 Hrs  T(C): 36.7 (14 Oct 2019 06:00), Max: 36.7 (14 Oct 2019 06:00)  T(F): 98 (14 Oct 2019 06:00), Max: 98 (14 Oct 2019 06:00)  HR: 64 (14 Oct 2019 07:00) (64 - 136)  BP: 99/64 (13 Oct 2019 07:35) (99/64 - 99/64)  BP(mean): 77 (13 Oct 2019 07:35) (77 - 77)  ABP: 98/46 (14 Oct 2019 07:00) (86/36 - 116/52)  ABP(mean): 66 (14 Oct 2019 07:00) (56 - 78)  RR: 23 (14 Oct 2019 07:00) (11 - 36)  SpO2: 98% (14 Oct 2019 07:00) (94% - 100%)      POCT Blood Glucose.: 377 mg/dL (13 Oct 2019 20:58)  POCT Blood Glucose.: 436 mg/dL (13 Oct 2019 16:37)  POCT Blood Glucose.: 424 mg/dL (13 Oct 2019 16:36)  POCT Blood Glucose.: 496 mg/dL (13 Oct 2019 13:15)  POCT Blood Glucose.: 485 mg/dL (13 Oct 2019 13:14)  POCT Blood Glucose.: 489 mg/dL (13 Oct 2019 12:03)  POCT Blood Glucose.: 473 mg/dL (13 Oct 2019 12:01)  POCT Blood Glucose.: 325 mg/dL (13 Oct 2019 08:32)    CAPILLARY BLOOD GLUCOSE      POCT Blood Glucose.: 377 mg/dL (13 Oct 2019 20:58)    I&O's Summary    13 Oct 2019 07:01  -  14 Oct 2019 07:00  --------------------------------------------------------  IN: 1280 mL / OUT: 825 mL / NET: 455 mL      Daily     Daily Weight in k.7 (14 Oct 2019 06:00)      PHYSICAL EXAMINATION:  General: ill-appearing elderly woman lying in bed on room air, appears comfortable  HEENT: PERRLA, EOMI, moist mucous membranes  Respiratory: clear bilaterally   CV: RRR, S1S2, no murmurs, rubs or gallops  Abdominal: Soft, nontender, nondistended  Extremities: no edema, + peripheral pulses      LABS:  ABG - ( 12 Oct 2019 11:07 )  pH, Arterial: 7.46  pH, Blood: x     /  pCO2: 33    /  pO2: 101   / HCO3: 23    / Base Excess: -.3   /  SaO2: 98                                      9.1    10.86 )-----------( 166      ( 14 Oct 2019 05:50 )             27.8     10-14    136  |  98  |  104<H>  ----------------------------<  385<H>  4.0   |  20<L>  |  2.29<H>    Ca    9.4      14 Oct 2019 05:51  Phos  5.3     10-14  Mg     2.8     10-14    TPro  7.5  /  Alb  3.6  /  TBili  0.4  /  DBili  x   /  AST  84<H>  /  ALT  502<H>  /  AlkPhos  105  10-14    LIVER FUNCTIONS - ( 14 Oct 2019 05:51 )  Alb: 3.6 g/dL / Pro: 7.5 g/dL / ALK PHOS: 105 U/L / ALT: 502 U/L / AST: 84 U/L / GGT: x             Creatine Kinase, Serum: 22 U/L (10-14 @ 05:51)  CKMB Units: 2.8 ng/mL (10-13 @ 23:46)  Creatine Kinase, Serum: 22 U/L (10-13 @ 23:46)  Creatine Kinase, Serum: 27 U/L (10-13 @ 16:48)  CKMB Units: 2.8 ng/mL (10-13 @ 16:48)    CARDIAC MARKERS ( 14 Oct 2019 05:51 )  x     / x     / 22 U/L / x     / x      CARDIAC MARKERS ( 13 Oct 2019 23:46 )  x     / x     / 22 U/L / x     / 2.8 ng/mL  CARDIAC MARKERS ( 13 Oct 2019 16:48 )  x     / x     / 27 U/L / x     / 2.8 ng/mL PATIENT:  SELVIN CLAIRE  [id number]    CHIEF COMPLAINT:  Patient is a 73y old  Female who presents with a chief complaint of Acute decompensated heart failure, SOB (13 Oct 2019 21:36)      INTERVAL HISTORY/OVERNIGHT EVENTS:     MEDICATIONS:  MEDICATIONS  (STANDING):  aspirin enteric coated 81 milliGRAM(s) Oral daily  atorvastatin 80 milliGRAM(s) Oral at bedtime  cefepime   IVPB 1000 milliGRAM(s) IV Intermittent every 24 hours  chlorhexidine 2% Cloths 1 Application(s) Topical <User Schedule>  clopidogrel Tablet 75 milliGRAM(s) Oral daily  dextrose 5%. 1000 milliLiter(s) (50 mL/Hr) IV Continuous <Continuous>  dextrose 50% Injectable 12.5 Gram(s) IV Push once  dextrose 50% Injectable 25 Gram(s) IV Push once  dextrose 50% Injectable 25 Gram(s) IV Push once  docusate sodium 100 milliGRAM(s) Oral two times a day  heparin  Injectable 5000 Unit(s) SubCutaneous every 12 hours  insulin glargine Injectable (LANTUS) 40 Unit(s) SubCutaneous at bedtime  insulin lispro (HumaLOG) corrective regimen sliding scale   SubCutaneous three times a day before meals  insulin lispro (HumaLOG) corrective regimen sliding scale   SubCutaneous at bedtime  insulin lispro Injectable (HumaLOG) 12 Unit(s) SubCutaneous three times a day before meals  levothyroxine 50 MICROGram(s) Oral daily  melatonin 5 milliGRAM(s) Oral at bedtime  pantoprazole    Tablet 40 milliGRAM(s) Oral before breakfast  polyethylene glycol 3350 17 Gram(s) Oral daily  senna 2 Tablet(s) Oral at bedtime  vasopressin Infusion 0.02 Unit(s)/Min (1.2 mL/Hr) IV Continuous <Continuous>    MEDICATIONS  (PRN):  ALPRAZolam 0.25 milliGRAM(s) Oral three times a day PRN Anxiety  dextrose 40% Gel 15 Gram(s) Oral once PRN Blood Glucose LESS THAN 70 milliGRAM(s)/deciliter  glucagon  Injectable 1 milliGRAM(s) IntraMuscular once PRN Glucose LESS THAN 70 milligrams/deciliter      ALLERGIES:  Allergies    azithromycin (Hives; Pruritus)    Intolerances        OBJECTIVE:  ICU Vital Signs Last 24 Hrs  T(C): 36.7 (14 Oct 2019 06:00), Max: 36.7 (14 Oct 2019 06:00)  T(F): 98 (14 Oct 2019 06:00), Max: 98 (14 Oct 2019 06:00)  HR: 64 (14 Oct 2019 07:00) (64 - 136)  BP: 99/64 (13 Oct 2019 07:35) (99/64 - 99/64)  BP(mean): 77 (13 Oct 2019 07:35) (77 - 77)  ABP: 98/46 (14 Oct 2019 07:00) (86/36 - 116/52)  ABP(mean): 66 (14 Oct 2019 07:00) (56 - 78)  RR: 23 (14 Oct 2019 07:00) (11 - 36)  SpO2: 98% (14 Oct 2019 07:00) (94% - 100%)      POCT Blood Glucose.: 377 mg/dL (13 Oct 2019 20:58)  POCT Blood Glucose.: 436 mg/dL (13 Oct 2019 16:37)  POCT Blood Glucose.: 424 mg/dL (13 Oct 2019 16:36)  POCT Blood Glucose.: 496 mg/dL (13 Oct 2019 13:15)  POCT Blood Glucose.: 485 mg/dL (13 Oct 2019 13:14)  POCT Blood Glucose.: 489 mg/dL (13 Oct 2019 12:03)  POCT Blood Glucose.: 473 mg/dL (13 Oct 2019 12:01)  POCT Blood Glucose.: 325 mg/dL (13 Oct 2019 08:32)    CAPILLARY BLOOD GLUCOSE      POCT Blood Glucose.: 377 mg/dL (13 Oct 2019 20:58)    I&O's Summary    13 Oct 2019 07:01  -  14 Oct 2019 07:00  --------------------------------------------------------  IN: 1280 mL / OUT: 825 mL / NET: 455 mL      Daily     Daily Weight in k.7 (14 Oct 2019 06:00)      PHYSICAL EXAMINATION:  General: ill-appearing elderly woman lying in bed on room air, appears comfortable  HEENT: PERRLA, EOMI, moist mucous membranes  Respiratory: clear bilaterally   CV: RRR, S1S2, no murmurs, rubs or gallops  Abdominal: Soft, nontender, nondistended  Extremities: no edema, + peripheral pulses      LABS:  ABG - ( 12 Oct 2019 11:07 )  pH, Arterial: 7.46  pH, Blood: x     /  pCO2: 33    /  pO2: 101   / HCO3: 23    / Base Excess: -.3   /  SaO2: 98                                      9.1    10.86 )-----------( 166      ( 14 Oct 2019 05:50 )             27.8     10-14    136  |  98  |  104<H>  ----------------------------<  385<H>  4.0   |  20<L>  |  2.29<H>    Ca    9.4      14 Oct 2019 05:51  Phos  5.3     10-14  Mg     2.8     10-14    TPro  7.5  /  Alb  3.6  /  TBili  0.4  /  DBili  x   /  AST  84<H>  /  ALT  502<H>  /  AlkPhos  105  10-14    LIVER FUNCTIONS - ( 14 Oct 2019 05:51 )  Alb: 3.6 g/dL / Pro: 7.5 g/dL / ALK PHOS: 105 U/L / ALT: 502 U/L / AST: 84 U/L / GGT: x             Creatine Kinase, Serum: 22 U/L (10-14 @ 05:51)  CKMB Units: 2.8 ng/mL (10-13 @ 23:46)  Creatine Kinase, Serum: 22 U/L (10-13 @ 23:46)  Creatine Kinase, Serum: 27 U/L (10-13 @ 16:48)  CKMB Units: 2.8 ng/mL (10-13 @ 16:48)    CARDIAC MARKERS ( 14 Oct 2019 05:51 )  x     / x     / 22 U/L / x     / x      CARDIAC MARKERS ( 13 Oct 2019 23:46 )  x     / x     / 22 U/L / x     / 2.8 ng/mL  CARDIAC MARKERS ( 13 Oct 2019 16:48 )  x     / x     / 27 U/L / x     / 2.8 ng/mL PATIENT:  SELVIN CLAIRE  [id number]    CHIEF COMPLAINT:  Patient is a 73y old  Female who presents with a chief complaint of Acute decompensated heart failure, SOB (13 Oct 2019 21:36)      INTERVAL HISTORY/OVERNIGHT EVENTS: Overnight pt became tachy to 130s, EKG consistent with SVT. Given 7.5 mg Lopressor IVP. SVT broke within one hour. No further episodes since. Unable to wean vasopressin as MAPs would not tolerate. Pt seen and examined at bedside this AM. States she feels fine. No chest pain or shortness of breath.    MEDICATIONS:  MEDICATIONS  (STANDING):  aspirin enteric coated 81 milliGRAM(s) Oral daily  atorvastatin 80 milliGRAM(s) Oral at bedtime  cefepime   IVPB 1000 milliGRAM(s) IV Intermittent every 24 hours  chlorhexidine 2% Cloths 1 Application(s) Topical <User Schedule>  clopidogrel Tablet 75 milliGRAM(s) Oral daily  dextrose 5%. 1000 milliLiter(s) (50 mL/Hr) IV Continuous <Continuous>  dextrose 50% Injectable 12.5 Gram(s) IV Push once  dextrose 50% Injectable 25 Gram(s) IV Push once  dextrose 50% Injectable 25 Gram(s) IV Push once  docusate sodium 100 milliGRAM(s) Oral two times a day  heparin  Injectable 5000 Unit(s) SubCutaneous every 12 hours  insulin glargine Injectable (LANTUS) 40 Unit(s) SubCutaneous at bedtime  insulin lispro (HumaLOG) corrective regimen sliding scale   SubCutaneous three times a day before meals  insulin lispro (HumaLOG) corrective regimen sliding scale   SubCutaneous at bedtime  insulin lispro Injectable (HumaLOG) 12 Unit(s) SubCutaneous three times a day before meals  levothyroxine 50 MICROGram(s) Oral daily  melatonin 5 milliGRAM(s) Oral at bedtime  pantoprazole    Tablet 40 milliGRAM(s) Oral before breakfast  polyethylene glycol 3350 17 Gram(s) Oral daily  senna 2 Tablet(s) Oral at bedtime  vasopressin Infusion 0.02 Unit(s)/Min (1.2 mL/Hr) IV Continuous <Continuous>    MEDICATIONS  (PRN):  ALPRAZolam 0.25 milliGRAM(s) Oral three times a day PRN Anxiety  dextrose 40% Gel 15 Gram(s) Oral once PRN Blood Glucose LESS THAN 70 milliGRAM(s)/deciliter  glucagon  Injectable 1 milliGRAM(s) IntraMuscular once PRN Glucose LESS THAN 70 milligrams/deciliter      ALLERGIES:  Allergies    azithromycin (Hives; Pruritus)    Intolerances        OBJECTIVE:  ICU Vital Signs Last 24 Hrs  T(C): 36.7 (14 Oct 2019 06:00), Max: 36.7 (14 Oct 2019 06:00)  T(F): 98 (14 Oct 2019 06:00), Max: 98 (14 Oct 2019 06:00)  HR: 64 (14 Oct 2019 07:00) (64 - 136)  BP: 99/64 (13 Oct 2019 07:35) (99/64 - 99/64)  BP(mean): 77 (13 Oct 2019 07:35) (77 - 77)  ABP: 98/46 (14 Oct 2019 07:00) (86/36 - 116/52)  ABP(mean): 66 (14 Oct 2019 07:00) (56 - 78)  RR: 23 (14 Oct 2019 07:00) (11 - 36)  SpO2: 98% (14 Oct 2019 07:00) (94% - 100%)      POCT Blood Glucose.: 377 mg/dL (13 Oct 2019 20:58)  POCT Blood Glucose.: 436 mg/dL (13 Oct 2019 16:37)  POCT Blood Glucose.: 424 mg/dL (13 Oct 2019 16:36)  POCT Blood Glucose.: 496 mg/dL (13 Oct 2019 13:15)  POCT Blood Glucose.: 485 mg/dL (13 Oct 2019 13:14)  POCT Blood Glucose.: 489 mg/dL (13 Oct 2019 12:03)  POCT Blood Glucose.: 473 mg/dL (13 Oct 2019 12:01)  POCT Blood Glucose.: 325 mg/dL (13 Oct 2019 08:32)    CAPILLARY BLOOD GLUCOSE      POCT Blood Glucose.: 377 mg/dL (13 Oct 2019 20:58)    I&O's Summary    13 Oct 2019 07:01  -  14 Oct 2019 07:00  --------------------------------------------------------  IN: 1280 mL / OUT: 825 mL / NET: 455 mL      Daily     Daily Weight in k.7 (14 Oct 2019 06:00)      PHYSICAL EXAMINATION:  General: elderly woman lying in bed on 2 L NC, appears comfortable  HEENT: PERRLA, EOMI, moist mucous membranes  Respiratory: clear bilaterally   CV: RRR, S1S2, no murmurs, rubs or gallops  Abdominal: Soft, nontender, nondistended  Extremities: no edema, + peripheral pulses      LABS:  ABG - ( 12 Oct 2019 11:07 )  pH, Arterial: 7.46  pH, Blood: x     /  pCO2: 33    /  pO2: 101   / HCO3: 23    / Base Excess: -.3   /  SaO2: 98                                      9.1    10.86 )-----------( 166      ( 14 Oct 2019 05:50 )             27.8     10-14    136  |  98  |  104<H>  ----------------------------<  385<H>  4.0   |  20<L>  |  2.29<H>    Ca    9.4      14 Oct 2019 05:51  Phos  5.3     10-14  Mg     2.8     10-14    TPro  7.5  /  Alb  3.6  /  TBili  0.4  /  DBili  x   /  AST  84<H>  /  ALT  502<H>  /  AlkPhos  105  10-14    LIVER FUNCTIONS - ( 14 Oct 2019 05:51 )  Alb: 3.6 g/dL / Pro: 7.5 g/dL / ALK PHOS: 105 U/L / ALT: 502 U/L / AST: 84 U/L / GGT: x             Creatine Kinase, Serum: 22 U/L (10-14 @ 05:51)  CKMB Units: 2.8 ng/mL (10-13 @ 23:46)  Creatine Kinase, Serum: 22 U/L (10-13 @ 23:46)  Creatine Kinase, Serum: 27 U/L (10-13 @ 16:48)  CKMB Units: 2.8 ng/mL (10-13 @ 16:48)    CARDIAC MARKERS ( 14 Oct 2019 05:51 )  x     / x     / 22 U/L / x     / x      CARDIAC MARKERS ( 13 Oct 2019 23:46 )  x     / x     / 22 U/L / x     / 2.8 ng/mL  CARDIAC MARKERS ( 13 Oct 2019 16:48 )  x     / x     / 27 U/L / x     / 2.8 ng/mL PATIENT:  SELVIN CLAIRE  [id number]    CHIEF COMPLAINT:  Patient is a 73y old  Female who presents with a chief complaint of Acute decompensated heart failure, SOB (13 Oct 2019 21:36)      INTERVAL HISTORY/OVERNIGHT EVENTS: Overnight pt became tachy to 130s, EKG consistent with SVT. Given 7.5 mg Lopressor IVP. SVT broke within one hour. No further episodes since. Unable to wean vasopressin as MAPs would not tolerate. Pt seen and examined at bedside this AM. States she feels fine. No chest pain or shortness of breath.    MEDICATIONS:  MEDICATIONS  (STANDING):  aspirin enteric coated 81 milliGRAM(s) Oral daily  atorvastatin 80 milliGRAM(s) Oral at bedtime  cefepime   IVPB 1000 milliGRAM(s) IV Intermittent every 24 hours  chlorhexidine 2% Cloths 1 Application(s) Topical <User Schedule>  clopidogrel Tablet 75 milliGRAM(s) Oral daily  dextrose 5%. 1000 milliLiter(s) (50 mL/Hr) IV Continuous <Continuous>  dextrose 50% Injectable 12.5 Gram(s) IV Push once  dextrose 50% Injectable 25 Gram(s) IV Push once  dextrose 50% Injectable 25 Gram(s) IV Push once  docusate sodium 100 milliGRAM(s) Oral two times a day  heparin  Injectable 5000 Unit(s) SubCutaneous every 12 hours  insulin glargine Injectable (LANTUS) 40 Unit(s) SubCutaneous at bedtime  insulin lispro (HumaLOG) corrective regimen sliding scale   SubCutaneous three times a day before meals  insulin lispro (HumaLOG) corrective regimen sliding scale   SubCutaneous at bedtime  insulin lispro Injectable (HumaLOG) 12 Unit(s) SubCutaneous three times a day before meals  levothyroxine 50 MICROGram(s) Oral daily  melatonin 5 milliGRAM(s) Oral at bedtime  pantoprazole    Tablet 40 milliGRAM(s) Oral before breakfast  polyethylene glycol 3350 17 Gram(s) Oral daily  senna 2 Tablet(s) Oral at bedtime  vasopressin Infusion 0.02 Unit(s)/Min (1.2 mL/Hr) IV Continuous <Continuous>    MEDICATIONS  (PRN):  ALPRAZolam 0.25 milliGRAM(s) Oral three times a day PRN Anxiety  dextrose 40% Gel 15 Gram(s) Oral once PRN Blood Glucose LESS THAN 70 milliGRAM(s)/deciliter  glucagon  Injectable 1 milliGRAM(s) IntraMuscular once PRN Glucose LESS THAN 70 milligrams/deciliter      ALLERGIES:  Allergies    azithromycin (Hives; Pruritus)    Intolerances        OBJECTIVE:  ICU Vital Signs Last 24 Hrs  T(C): 36.7 (14 Oct 2019 06:00), Max: 36.7 (14 Oct 2019 06:00)  T(F): 98 (14 Oct 2019 06:00), Max: 98 (14 Oct 2019 06:00)  HR: 64 (14 Oct 2019 07:00) (64 - 136)  BP: 99/64 (13 Oct 2019 07:35) (99/64 - 99/64)  BP(mean): 77 (13 Oct 2019 07:35) (77 - 77)  ABP: 98/46 (14 Oct 2019 07:00) (86/36 - 116/52)  ABP(mean): 66 (14 Oct 2019 07:00) (56 - 78)  RR: 23 (14 Oct 2019 07:00) (11 - 36)  SpO2: 98% (14 Oct 2019 07:00) (94% - 100%)      POCT Blood Glucose.: 377 mg/dL (13 Oct 2019 20:58)  POCT Blood Glucose.: 436 mg/dL (13 Oct 2019 16:37)  POCT Blood Glucose.: 424 mg/dL (13 Oct 2019 16:36)  POCT Blood Glucose.: 496 mg/dL (13 Oct 2019 13:15)  POCT Blood Glucose.: 485 mg/dL (13 Oct 2019 13:14)  POCT Blood Glucose.: 489 mg/dL (13 Oct 2019 12:03)  POCT Blood Glucose.: 473 mg/dL (13 Oct 2019 12:01)  POCT Blood Glucose.: 325 mg/dL (13 Oct 2019 08:32)    CAPILLARY BLOOD GLUCOSE      POCT Blood Glucose.: 377 mg/dL (13 Oct 2019 20:58)    I&O's Summary    13 Oct 2019 07:01  -  14 Oct 2019 07:00  --------------------------------------------------------  IN: 1280 mL / OUT: 825 mL / NET: 455 mL      Daily     Daily Weight in k.7 (14 Oct 2019 06:00)      PHYSICAL EXAMINATION:  General: elderly woman lying in bed, appears comfortable  HEENT: PERRLA, EOMI, moist mucous membranes  Respiratory: clear bilaterally   CV: RRR, S1S2, no murmurs, rubs or gallops  Abdominal: Soft, nontender, nondistended  Extremities: no edema, + peripheral pulses      LABS:  ABG - ( 12 Oct 2019 11:07 )  pH, Arterial: 7.46  pH, Blood: x     /  pCO2: 33    /  pO2: 101   / HCO3: 23    / Base Excess: -.3   /  SaO2: 98                                      9.1    10.86 )-----------( 166      ( 14 Oct 2019 05:50 )             27.8     10-14    136  |  98  |  104<H>  ----------------------------<  385<H>  4.0   |  20<L>  |  2.29<H>    Ca    9.4      14 Oct 2019 05:51  Phos  5.3     10-14  Mg     2.8     10-14    TPro  7.5  /  Alb  3.6  /  TBili  0.4  /  DBili  x   /  AST  84<H>  /  ALT  502<H>  /  AlkPhos  105  10-14    LIVER FUNCTIONS - ( 14 Oct 2019 05:51 )  Alb: 3.6 g/dL / Pro: 7.5 g/dL / ALK PHOS: 105 U/L / ALT: 502 U/L / AST: 84 U/L / GGT: x             Creatine Kinase, Serum: 22 U/L (10-14 @ 05:51)  CKMB Units: 2.8 ng/mL (10-13 @ 23:46)  Creatine Kinase, Serum: 22 U/L (10-13 @ 23:46)  Creatine Kinase, Serum: 27 U/L (10-13 @ 16:48)  CKMB Units: 2.8 ng/mL (10-13 @ 16:48)    CARDIAC MARKERS ( 14 Oct 2019 05:51 )  x     / x     / 22 U/L / x     / x      CARDIAC MARKERS ( 13 Oct 2019 23:46 )  x     / x     / 22 U/L / x     / 2.8 ng/mL  CARDIAC MARKERS ( 13 Oct 2019 16:48 )  x     / x     / 27 U/L / x     / 2.8 ng/mL

## 2019-10-14 NOTE — PROGRESS NOTE ADULT - SUBJECTIVE AND OBJECTIVE BOX
Chief complaint  Patient is a 73y old  Female who presents with a chief complaint of Acute decompensated heart failure, SOB (14 Oct 2019 16:18)   Review of systems  Patient in bed, looks comfortable, no fever, no hypoglycemia.    Labs and Fingersticks  CAPILLARY BLOOD GLUCOSE      POCT Blood Glucose.: 387 mg/dL (14 Oct 2019 17:06)  POCT Blood Glucose.: 418 mg/dL (14 Oct 2019 12:17)  POCT Blood Glucose.: 428 mg/dL (14 Oct 2019 11:01)  POCT Blood Glucose.: 414 mg/dL (14 Oct 2019 10:59)  POCT Blood Glucose.: 383 mg/dL (14 Oct 2019 08:06)  POCT Blood Glucose.: 377 mg/dL (13 Oct 2019 20:58)      Anion Gap, Serum: 18 <H> (10-14 @ 05:51)  Anion Gap, Serum: 16 (10-13 @ 12:27)  Anion Gap, Serum: 18 <H> (10-13 @ 04:46)  Anion Gap, Serum: 17 (10-12 @ 23:37)      Calcium, Total Serum: 9.4 (10-14 @ 05:51)  Calcium, Total Serum: 9.4 (10-13 @ 12:27)  Calcium, Total Serum: 10.0 (10-13 @ 04:46)  Calcium, Total Serum: 9.3 (10-12 @ 23:37)  Albumin, Serum: 3.6 (10-14 @ 05:51)  Albumin, Serum: 3.5 (10-13 @ 04:46)  Albumin, Serum: 3.5 (10-12 @ 23:37)    Alanine Aminotransferase (ALT/SGPT): 502 <H> (10-14 @ 05:51)  Alanine Aminotransferase (ALT/SGPT): 691 <H> (10-13 @ 04:46)  Alanine Aminotransferase (ALT/SGPT): 712 <H> (10-12 @ 23:37)  Alkaline Phosphatase, Serum: 105 (10-14 @ 05:51)  Alkaline Phosphatase, Serum: 118 (10-13 @ 04:46)  Alkaline Phosphatase, Serum: 120 (10-12 @ 23:37)  Aspartate Aminotransferase (AST/SGOT): 84 <H> (10-14 @ 05:51)  Aspartate Aminotransferase (AST/SGOT): 153 <H> (10-13 @ 04:46)  Aspartate Aminotransferase (AST/SGOT): 169 <H> (10-12 @ 23:37)        10-14    136  |  98  |  104<H>  ----------------------------<  385<H>  4.0   |  20<L>  |  2.29<H>    Ca    9.4      14 Oct 2019 05:51  Phos  5.3     10-14  Mg     2.8     10-14    TPro  6.9  /  Alb  x   /  TBili  x   /  DBili  x   /  AST  x   /  ALT  x   /  AlkPhos  x   10-14                        9.1    10.86 )-----------( 166      ( 14 Oct 2019 05:50 )             27.8     Medications  MEDICATIONS  (STANDING):  aspirin enteric coated 81 milliGRAM(s) Oral daily  atorvastatin 80 milliGRAM(s) Oral at bedtime  cefepime   IVPB 1000 milliGRAM(s) IV Intermittent every 24 hours  chlorhexidine 2% Cloths 1 Application(s) Topical <User Schedule>  clopidogrel Tablet 75 milliGRAM(s) Oral daily  dextrose 5%. 1000 milliLiter(s) (50 mL/Hr) IV Continuous <Continuous>  dextrose 50% Injectable 12.5 Gram(s) IV Push once  dextrose 50% Injectable 25 Gram(s) IV Push once  dextrose 50% Injectable 25 Gram(s) IV Push once  docusate sodium 100 milliGRAM(s) Oral two times a day  heparin  Injectable 5000 Unit(s) SubCutaneous every 12 hours  insulin glargine Injectable (LANTUS) 60 Unit(s) SubCutaneous at bedtime  insulin lispro (HumaLOG) corrective regimen sliding scale   SubCutaneous three times a day before meals  insulin lispro (HumaLOG) corrective regimen sliding scale   SubCutaneous at bedtime  insulin lispro Injectable (HumaLOG) 18 Unit(s) SubCutaneous three times a day before meals  levothyroxine 50 MICROGram(s) Oral daily  melatonin 5 milliGRAM(s) Oral at bedtime  midodrine 10 milliGRAM(s) Oral every 8 hours  pantoprazole    Tablet 40 milliGRAM(s) Oral before breakfast  polyethylene glycol 3350 17 Gram(s) Oral daily  senna 2 Tablet(s) Oral at bedtime  vasopressin Infusion 0.02 Unit(s)/Min (1.2 mL/Hr) IV Continuous <Continuous>      Physical Exam  General: Patient comfortable in bed  Vital Signs Last 12 Hrs  T(F): 98.6 (10-14-19 @ 16:00), Max: 98.6 (10-14-19 @ 16:00)  HR: 70 (10-14-19 @ 17:00) (62 - 80)  BP: 95/57 (10-14-19 @ 08:00) (95/57 - 95/57)  BP(mean): 74 (10-14-19 @ 08:00) (74 - 74)  RR: 18 (10-14-19 @ 17:00) (18 - 31)  SpO2: 98% (10-14-19 @ 17:00) (94% - 100%)  Neck: No palpable thyroid nodules.  CVS: S1S2, No murmurs  Respiratory: No wheezing, no crepitations  GI: Abdomen soft, bowel sounds positive  Musculoskeletal:  edema lower extremities.   Skin: No skin rashes, no ecchymosis    Diagnostics

## 2019-10-14 NOTE — CHART NOTE - NSCHARTNOTEFT_GEN_A_CORE
Nutrition Follow Up Note    Patient seen for malnutrition follow up. Per chart, pt is a 73 year old female c PMH of HTN, HLD, DM2, CAD s/p CABG (LIMA to LAD, SVG to OM, SVG to PDA), severe mitral regurgitation s/p mitral clip, severe pulmonary HTN, CKD IV, hypothyroidism presented with two days of SOB".     Chart reviewed, events noted. Nephrology following for MERVIN on CKD stage 4, metabolic acidosis, hypotension, proteinuria/hematuria. On 10/13, noted c hypotension; received IV bolus and started on vasopressin. Hyperglycemia noted; POCT: blood glucose 131-496 (10/13), 383-428 mg/dL (10/14); per chart, insulin regimen adjusted. Labs further notable for Phosphorus 5.3 (10/14).     Source: Comprehensive chart review, RN     Diet :  Consistent Carbohydrate, DASH/TLC, Renal    Patient sleeping at time of visit; spoke with RN who reports pt with fair to good appetite and intake and accepts Diet Mighty Shakes. No reports of acute GI distress. RN confirms last BM 10/12; noted to be ordered for bowel regimen. RN made aware RD remains available to discuss food preferences with patient PRN.      PO intake : 50-75%     Source for PO intake: RN, nursing flowsheets     Enteral /Parenteral Nutrition: n/a    Daily Weight in k.7 (10-14), Weight in k (10-13), Weight in k.7 (10-12), Weight in k.4 (10-11), Weight in k.9 (10-10) - Weight fluctuations noted; relatively stable within 52.7-53.4 kg.     Pertinent Medications: MEDICATIONS  (STANDING):  aspirin enteric coated 81 milliGRAM(s) Oral daily  atorvastatin 80 milliGRAM(s) Oral at bedtime  cefepime   IVPB 1000 milliGRAM(s) IV Intermittent every 24 hours  chlorhexidine 2% Cloths 1 Application(s) Topical <User Schedule>  clopidogrel Tablet 75 milliGRAM(s) Oral daily  dextrose 5%. 1000 milliLiter(s) (50 mL/Hr) IV Continuous <Continuous>  dextrose 50% Injectable 12.5 Gram(s) IV Push once  dextrose 50% Injectable 25 Gram(s) IV Push once  dextrose 50% Injectable 25 Gram(s) IV Push once  docusate sodium 100 milliGRAM(s) Oral two times a day  heparin  Injectable 5000 Unit(s) SubCutaneous every 12 hours  insulin glargine Injectable (LANTUS) 60 Unit(s) SubCutaneous at bedtime  insulin lispro (HumaLOG) corrective regimen sliding scale   SubCutaneous three times a day before meals  insulin lispro (HumaLOG) corrective regimen sliding scale   SubCutaneous at bedtime  insulin lispro Injectable (HumaLOG) 18 Unit(s) SubCutaneous three times a day before meals  levothyroxine 50 MICROGram(s) Oral daily  melatonin 5 milliGRAM(s) Oral at bedtime  midodrine 10 milliGRAM(s) Oral every 8 hours  pantoprazole    Tablet 40 milliGRAM(s) Oral before breakfast  polyethylene glycol 3350 17 Gram(s) Oral daily  senna 2 Tablet(s) Oral at bedtime    MEDICATIONS  (PRN):  ALPRAZolam 0.25 milliGRAM(s) Oral three times a day PRN Anxiety  dextrose 40% Gel 15 Gram(s) Oral once PRN Blood Glucose LESS THAN 70 milliGRAM(s)/deciliter  glucagon  Injectable 1 milliGRAM(s) IntraMuscular once PRN Glucose LESS THAN 70 milligrams/deciliter    Pertinent Labs:   10-14 @ 05:51: Na 136, <H>, Cr 2.29<H>, <H>, K+ 4.0, Phos 5.3<H>, Mg 2.8<H>, Alk Phos 105, ALT/SGPT 502<H>, AST/SGOT 84<H>, 10-13 @ 16:48:<HH>    Finger Sticks:  POCT Blood Glucose.: 418 mg/dL (10-14 @ 12:17)  POCT Blood Glucose.: 428 mg/dL (10-14 @ 11:01)  POCT Blood Glucose.: 414 mg/dL (10-14 @ 10:59)  POCT Blood Glucose.: 383 mg/dL (10-14 @ 08:06)  POCT Blood Glucose.: 377 mg/dL (10-13 @ 20:58)  POCT Blood Glucose.: 436 mg/dL (10-13 @ 16:37)  POCT Blood Glucose.: 424 mg/dL (10-13 @ 16:36)      Skin per nursing documentation: intact except blanchable redness to heels  Edema per nursing documentation: none noted at this time    Estimated Needs:   [x ] no change since previous assessment  [ ] recalculated:     Previous Nutrition Diagnosis: Moderate Malnutrition  Nutrition Diagnosis is: ongoing, addressed with oral nutrition supplements    New Nutrition Diagnosis: n/a     Interventions:  Reinforced components of diet order c RN in setting of hyperglycemia    Recommend  1) Continue current diet order of Consistent Carbohydrate, DASH/TLC, Renal  2) Encourage PO intake and provide feeding assistance PRN   3) Monitor blood glucose and electrolytes     Monitoring and Evaluation:     Continue to monitor Nutritional intake, Tolerance to diet prescription, weights, labs, skin integrity    RD remains available upon request and will follow up per protocol

## 2019-10-14 NOTE — CONSULT NOTE ADULT - SUBJECTIVE AND OBJECTIVE BOX
Patient seen and evaluated at bedside    Chief Complaint:    HPI:  Bea is a 72 yo woman with a PMHx of HTN, HLD, T2DM, GERD, CAD s/p CABG (LIMA to LAD, SVG to OM, SVG to PDA 2014 at [facility]), severe mitral regurgitation s/p mitral clip, severe pulmonary HTN, CKD IV, hypothyroidism presenting on 10/8 with 2 day history of SOB. Pt states she has been feeling increasingly short of breath for the past couple of days more pronounced with exertion a/w chest heaviness. Also reports a cough.     Admitted to the CCU for NSTEM/ADHF. S/p RHC and IABP 10/8. Weaned off of BIPAP to NC. Hemodynamics overnight CO: 5.25, CI: 3.57, , CVP ~11. MVO2 64%, lactate 1.6. Patient developed an 2 episodes of SVT on 10. One episode resolved with lidocaine and adenosine. Second episode resolved with adenosine. Continued on. IABP, swan removed on 10/11. Given 1x dose of Lopressor 12.5 mg 2/2 SVT (). Overnight pt's carvedilol was increased to 25 and patient became hypotensive requiring IVF, vasopressin. Overnight 10/13, had another episode of SVT, HR increased to 136 bpm, lopressor 5mg IVP x1 then 2.5mg x1 given.  Pt then converted to sinus rhythm with rate 60-70s. Remains on vasopressin @ 0.02, BP soft and unable to wean off.     On my exam, vitals T 97.7, HR 76, BP 95/57, RR 22, satting 100% on RA.  Labs today notable for WBC 10.86, Hgb 9.1, Plt 166, POCT glucose 418, Na 136, K 4.0, Cl 98, Co2 20, [id number], Cr 2.29, HsTrop 522->456, lactate 1.8. EKG form 10/10 with NSR, first degree AVB w/ occasional PVCs, RBBB, lateral T wave abnormalities. Tele      PMH:   Urinary tract infection  GERD (gastroesophageal reflux disease)  Hypertension  CAD (coronary artery disease)  Hypothyroidism  Hyperlipidemia  Diabetes mellitus, type 2  Diabetes mellitus type 2 in nonobese      PSH:   S/P CABG (coronary artery bypass graft)      Medications:   ALPRAZolam 0.25 milliGRAM(s) Oral three times a day PRN  aspirin enteric coated 81 milliGRAM(s) Oral daily  atorvastatin 80 milliGRAM(s) Oral at bedtime  cefepime   IVPB 1000 milliGRAM(s) IV Intermittent every 24 hours  chlorhexidine 2% Cloths 1 Application(s) Topical <User Schedule>  clopidogrel Tablet 75 milliGRAM(s) Oral daily  dextrose 40% Gel 15 Gram(s) Oral once PRN  dextrose 5%. 1000 milliLiter(s) IV Continuous <Continuous>  dextrose 50% Injectable 12.5 Gram(s) IV Push once  dextrose 50% Injectable 25 Gram(s) IV Push once  dextrose 50% Injectable 25 Gram(s) IV Push once  docusate sodium 100 milliGRAM(s) Oral two times a day  glucagon  Injectable 1 milliGRAM(s) IntraMuscular once PRN  heparin  Injectable 5000 Unit(s) SubCutaneous every 12 hours  insulin glargine Injectable (LANTUS) 60 Unit(s) SubCutaneous at bedtime  insulin lispro (HumaLOG) corrective regimen sliding scale   SubCutaneous three times a day before meals  insulin lispro (HumaLOG) corrective regimen sliding scale   SubCutaneous at bedtime  insulin lispro Injectable (HumaLOG) 18 Unit(s) SubCutaneous three times a day before meals  levothyroxine 50 MICROGram(s) Oral daily  melatonin 5 milliGRAM(s) Oral at bedtime  midodrine 10 milliGRAM(s) Oral every 8 hours  pantoprazole    Tablet 40 milliGRAM(s) Oral before breakfast  polyethylene glycol 3350 17 Gram(s) Oral daily  senna 2 Tablet(s) Oral at bedtime      Allergies:  azithromycin (Hives; Pruritus)      FAMILY HISTORY:  Family history of hypertension (Sibling): sister  Family history of diabetes mellitus (Sibling): brothers/sisters      Social History:  Smoking History:  Alcohol Use:  Drug Use:    Review of Systems:  REVIEW OF SYSTEMS:  CONSTITUTIONAL: No weakness, fevers or chills  EYES/ENT: No visual changes;  No dysphagia  NECK: No pain or stiffness  RESPIRATORY: No cough, wheezing, hemoptysis; No shortness of breath  CARDIOVASCULAR: No chest pain or palpitations; No lower extremity edema  GASTROINTESTINAL: No abdominal or epigastric pain. No nausea, vomiting, or hematemesis; No diarrhea or constipation. No melena or hematochezia.  BACK: No back pain  GENITOURINARY: No dysuria, frequency or hematuria  NEUROLOGICAL: No numbness or weakness  SKIN: No itching, burning, rashes, or lesions   All other review of systems is negative unless indicated above.    Physical Exam:  T(F): 97.7 (10-14), Max: 98 (10-14)  HR: 76 (10-14) (62 - 136)  BP: 95/57 (10-14) (95/57 - 95/57)  RR: 22 (10-14)  SpO2: 99% (10-14)  GENERAL: No acute distress, well-developed  HEAD:  Atraumatic, Normocephalic  ENT: EOMI, PERRLA, conjunctiva and sclera clear, Neck supple, No JVD, moist mucosa  CHEST/LUNG: Clear to auscultation bilaterally; No wheeze, equal breath sounds bilaterally   BACK: No spinal tenderness  HEART: Regular rate and rhythm; No murmurs, rubs, or gallops  ABDOMEN: Soft, Nontender, Nondistended; Bowel sounds present  EXTREMITIES:  No clubbing, cyanosis, or edema  PSYCH: Nl behavior, nl affect  NEUROLOGY: AAOx3, non-focal, cranial nerves intact  SKIN: Normal color, No rashes or lesions  LINES:    Cardiovascular Diagnostic Testing:    ECG: Personally reviewed    Echo:  IMAGING:  < from: TTE with Doppler (w/Cont) (10.08.19 @ 09:03) >  Conclusions:  Endocardial visualization enhanced with intravenous  injection of Ultrasonic Enhancing Agent (Definity).  Severely reduced left ventricular systolic function.  Moderate right ventricular enlargement. Right ventricular  systolic function is at least moderately redcued.  s/p MitraClip.  Moderate pulmonary hypertension.    < end of copied text >      Stress Testing:    Cath:    Interpretation of Telemetry:    Imaging:    Labs: Personally reviewed                        9.1    10.86 )-----------( 166      ( 14 Oct 2019 05:50 )             27.8     10-14    136  |  98  |  104<H>  ----------------------------<  385<H>  4.0   |  20<L>  |  2.29<H>    Ca    9.4      14 Oct 2019 05:51  Phos  5.3     10-14  Mg     2.8     10-14    TPro  7.5  /  Alb  3.6  /  TBili  0.4  /  DBili  x   /  AST  84<H>  /  ALT  502<H>  /  AlkPhos  105  10-14      CARDIAC MARKERS ( 14 Oct 2019 05:51 )  x     / x     / 22 U/L / x     / 3.1 ng/mL  CARDIAC MARKERS ( 13 Oct 2019 23:46 )  x     / x     / 22 U/L / x     / 2.8 ng/mL  CARDIAC MARKERS ( 13 Oct 2019 16:48 )  x     / x     / 27 U/L / x     / 2.8 ng/mL      Serum Pro-Brain Natriuretic Peptide: 37615 pg/mL (10-08 @ 09:36)  Serum Pro-Brain Natriuretic Peptide: 82612 pg/mL (10-08 @ 00:40)  Serum Pro-Brain Natriuretic Peptide: 06182 pg/mL (10-07 @ 22:28)      Hemoglobin A1C, Whole Blood: 7.5 % (10-08 @ 14:30)    Thyroid Stimulating Hormone, Serum: 1.01 uIU/mL (10-08 @ 14:47) Patient seen and evaluated at bedside    Chief Complaint:    HPI:  Patient is a 72 yo woman with a PMHx of HTN, HLD, T2DM, GERD, CAD s/p CABG (LIMA to LAD, SVG to OM, SVG to PDA 2014 at [facility]), HFrEF (25-30%), severe mitral regurgitation s/p mitral clip, severe pulmonary HTN, CKD IV, hypothyroidism presenting on 10/8 with 2 day history of SOB. Pt states she has been feeling increasingly short of breath for the past couple of days more pronounced with exertion a/w chest heaviness. Also reports a cough.     Admitted to the CCU for NSTEM/ADHF. S/p RHC and IABP 10/8. Weaned off of BIPAP to NC. Hemodynamics overnight CO: 5.25, CI: 3.57, , CVP ~11. MVO2 64%, lactate 1.6. Patient developed an 2 episodes of SVT on 10. One episode resolved with lidocaine and adenosine. Second episode resolved with adenosine. Continued on. IABP, swan removed on 10/11. Given 1x dose of Lopressor 12.5 mg 2/2 SVT (). Overnight pt's carvedilol was increased to 25 and patient became hypotensive requiring IVF, vasopressin. Overnight 10/13, had another episode of SVT, HR increased to 136 bpm, lopressor 5mg IVP x1 then 2.5mg x1 given.  Pt then converted to sinus rhythm with rate 60-70s. Remains on vasopressin @ 0.02, BP soft and unable to wean off.     On my exam, vitals T 97.7, HR 76, BP 95/57, RR 22, satting 100% on RA.  Labs today notable for WBC 10.86, Hgb 9.1, Plt 166, POCT glucose 418, Na 136, K 4.0, Cl 98, Co2 20, [id number], Cr 2.29, HsTrop 522->456, lactate 1.8. EKG form 10/10 with NSR, first degree AVB w/ occasional PVCs, RBBB, lateral T wave abnormalities.       PMH:   Urinary tract infection  GERD (gastroesophageal reflux disease)  Hypertension  CAD (coronary artery disease)  Hypothyroidism  Hyperlipidemia  Diabetes mellitus, type 2  Diabetes mellitus type 2 in nonobese      PSH:   S/P CABG (coronary artery bypass graft)      Medications:   ALPRAZolam 0.25 milliGRAM(s) Oral three times a day PRN  aspirin enteric coated 81 milliGRAM(s) Oral daily  atorvastatin 80 milliGRAM(s) Oral at bedtime  cefepime   IVPB 1000 milliGRAM(s) IV Intermittent every 24 hours  chlorhexidine 2% Cloths 1 Application(s) Topical <User Schedule>  clopidogrel Tablet 75 milliGRAM(s) Oral daily  dextrose 40% Gel 15 Gram(s) Oral once PRN  dextrose 5%. 1000 milliLiter(s) IV Continuous <Continuous>  dextrose 50% Injectable 12.5 Gram(s) IV Push once  dextrose 50% Injectable 25 Gram(s) IV Push once  dextrose 50% Injectable 25 Gram(s) IV Push once  docusate sodium 100 milliGRAM(s) Oral two times a day  glucagon  Injectable 1 milliGRAM(s) IntraMuscular once PRN  heparin  Injectable 5000 Unit(s) SubCutaneous every 12 hours  insulin glargine Injectable (LANTUS) 60 Unit(s) SubCutaneous at bedtime  insulin lispro (HumaLOG) corrective regimen sliding scale   SubCutaneous three times a day before meals  insulin lispro (HumaLOG) corrective regimen sliding scale   SubCutaneous at bedtime  insulin lispro Injectable (HumaLOG) 18 Unit(s) SubCutaneous three times a day before meals  levothyroxine 50 MICROGram(s) Oral daily  melatonin 5 milliGRAM(s) Oral at bedtime  midodrine 10 milliGRAM(s) Oral every 8 hours  pantoprazole    Tablet 40 milliGRAM(s) Oral before breakfast  polyethylene glycol 3350 17 Gram(s) Oral daily  senna 2 Tablet(s) Oral at bedtime      Allergies:  azithromycin (Hives; Pruritus)      FAMILY HISTORY:  Family history of hypertension (Sibling): sister  Family history of diabetes mellitus (Sibling): brothers/sisters      Social History:  Smoking History: Denies  Alcohol Use: Denies  Drug Use: Denies    Review of Systems:  REVIEW OF SYSTEMS:  CONSTITUTIONAL: No weakness, fevers or chills  EYES/ENT: No visual changes;  No dysphagia  NECK: No pain or stiffness  RESPIRATORY: No cough, wheezing, hemoptysis; No shortness of breath  CARDIOVASCULAR: No chest pain or palpitations; No lower extremity edema  GASTROINTESTINAL: No abdominal or epigastric pain. No nausea, vomiting, or hematemesis; No diarrhea or constipation. No melena or hematochezia.  BACK: No back pain  GENITOURINARY: No dysuria, frequency or hematuria  NEUROLOGICAL: No numbness or weakness  SKIN: No itching, burning, rashes, or lesions   All other review of systems is negative unless indicated above.    Physical Exam:  T(F): 97.7 (10-14), Max: 98 (10-14)  HR: 76 (10-14) (62 - 136)  BP: 95/57 (10-14) (95/57 - 95/57)  RR: 22 (10-14)  SpO2: 99% (10-14)  General: elderly woman lying in bed, appears tachypneic, slightly uncomfortable not fully answering questions  HEENT: PERRLA, EOMI, moist mucous membranes  Respiratory: clear bilaterally   CV: RRR, S1S2, no murmurs, rubs or gallops  Abdominal: Soft, nontender, nondistended  Extremities: no edema, + peripheral pulses    Cardiovascular Diagnostic Testing:    Echo:  TTE 10/13:  Conclusions:  1. S/P MitraClip. Mild mitral regurgitation.  Mean  transmitral valve gradient equals 4 mm Hg. (HR 62)  2. Despite intravenous injection of Ultrasonic Enhancing  Agent (Definity) endocardial visualization is limited.  Grossly severe left ventricular systolic dysfunction.  3. Normal right ventricular size with decreased right  ventricular systolic function.  4. Normal tricuspid valve. Mild-moderate tricuspid  regurgitation.  5. Estimated pulmonary artery systolic pressure equals 49  mm Hg, assuming right atrial pressure equals 8 mm Hg,  consistent with mild pulmonary pressures.  ------------------------------------------------------------------------  Confirmed on  10/13/2019 - 15:35:24 by Bushra Werner M.D.  ------------------------------------------------------------------------        Stress Testing:    Cath:    Interpretation of Telemetry:    Imaging:    Labs: Personally reviewed                        9.1    10.86 )-----------( 166      ( 14 Oct 2019 05:50 )             27.8     10-14    136  |  98  |  104<H>  ----------------------------<  385<H>  4.0   |  20<L>  |  2.29<H>    Ca    9.4      14 Oct 2019 05:51  Phos  5.3     10-14  Mg     2.8     10-14    TPro  7.5  /  Alb  3.6  /  TBili  0.4  /  DBili  x   /  AST  84<H>  /  ALT  502<H>  /  AlkPhos  105  10-14      CARDIAC MARKERS ( 14 Oct 2019 05:51 )  x     / x     / 22 U/L / x     / 3.1 ng/mL  CARDIAC MARKERS ( 13 Oct 2019 23:46 )  x     / x     / 22 U/L / x     / 2.8 ng/mL  CARDIAC MARKERS ( 13 Oct 2019 16:48 )  x     / x     / 27 U/L / x     / 2.8 ng/mL      Serum Pro-Brain Natriuretic Peptide: 99222 pg/mL (10-08 @ 09:36)  Serum Pro-Brain Natriuretic Peptide: 68580 pg/mL (10-08 @ 00:40)  Serum Pro-Brain Natriuretic Peptide: 75398 pg/mL (10-07 @ 22:28)      Hemoglobin A1C, Whole Blood: 7.5 % (10-08 @ 14:30)    Thyroid Stimulating Hormone, Serum: 1.01 uIU/mL (10-08 @ 14:47) Patient seen and evaluated at bedside    Chief Complaint:    HPI:  Patient is a 74 yo woman with a PMHx of HTN, HLD, T2DM, GERD, CAD s/p CABG (LIMA to LAD, SVG to OM, SVG to PDA 2014 at [facility]), HFrEF (25-30%), severe mitral regurgitation s/p mitral clip, severe pulmonary HTN, CKD IV, hypothyroidism presenting on 10/8 with 2 day history of SOB. Pt states she has been feeling increasingly short of breath for the past couple of days more pronounced with exertion a/w chest heaviness. Also reports a cough.     Admitted to the CCU for NSTEM/ADHF. S/p RHC and IABP 10/8. Weaned off of BIPAP to NC. Hemodynamics overnight CO: 5.25, CI: 3.57, , CVP ~11. MVO2 64%, lactate 1.6. Patient developed an 2 episodes of SVT on 10. One episode resolved with lidocaine and adenosine. Second episode resolved with adenosine. Continued on. IABP, swan removed on 10/11. Given 1x dose of Lopressor 12.5 mg 2/2 SVT (). Overnight pt's carvedilol was increased to 25 and patient became hypotensive requiring IVF, vasopressin. Overnight 10/13, had another episode of SVT, HR increased to 136 bpm, lopressor 5mg IVP x1 then 2.5mg x1 given.  Pt then converted to sinus rhythm with rate 60-70s. Remains on vasopressin @ 0.02, BP soft and unable to wean off.     On my exam, vitals T 97.7, HR 76, BP 95/57, RR 22, satting 100% on RA.  Labs today notable for WBC 10.86, Hgb 9.1, Plt 166, POCT glucose 418, Na 136, K 4.0, Cl 98, Co2 20, [id number], Cr 2.29, HsTrop 522->456, lactate 1.8. EKG form 10/10 with NSR, first degree AVB w/ occasional PVCs, RBBB, lateral T wave abnormalities. 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      PMH:   Urinary tract infection  GERD (gastroesophageal reflux disease)  Hypertension  CAD (coronary artery disease)  Hypothyroidism  Hyperlipidemia  Diabetes mellitus, type 2  Diabetes mellitus type 2 in nonobese      PSH:   S/P CABG (coronary artery bypass graft)      Medications:   ALPRAZolam 0.25 milliGRAM(s) Oral three times a day PRN  aspirin enteric coated 81 milliGRAM(s) Oral daily  atorvastatin 80 milliGRAM(s) Oral at bedtime  cefepime   IVPB 1000 milliGRAM(s) IV Intermittent every 24 hours  chlorhexidine 2% Cloths 1 Application(s) Topical <User Schedule>  clopidogrel Tablet 75 milliGRAM(s) Oral daily  dextrose 40% Gel 15 Gram(s) Oral once PRN  dextrose 5%. 1000 milliLiter(s) IV Continuous <Continuous>  dextrose 50% Injectable 12.5 Gram(s) IV Push once  dextrose 50% Injectable 25 Gram(s) IV Push once  dextrose 50% Injectable 25 Gram(s) IV Push once  docusate sodium 100 milliGRAM(s) Oral two times a day  glucagon  Injectable 1 milliGRAM(s) IntraMuscular once PRN  heparin  Injectable 5000 Unit(s) SubCutaneous every 12 hours  insulin glargine Injectable (LANTUS) 60 Unit(s) SubCutaneous at bedtime  insulin lispro (HumaLOG) corrective regimen sliding scale   SubCutaneous three times a day before meals  insulin lispro (HumaLOG) corrective regimen sliding scale   SubCutaneous at bedtime  insulin lispro Injectable (HumaLOG) 18 Unit(s) SubCutaneous three times a day before meals  levothyroxine 50 MICROGram(s) Oral daily  melatonin 5 milliGRAM(s) Oral at bedtime  midodrine 10 milliGRAM(s) Oral every 8 hours  pantoprazole    Tablet 40 milliGRAM(s) Oral before breakfast  polyethylene glycol 3350 17 Gram(s) Oral daily  senna 2 Tablet(s) Oral at bedtime      Allergies:  azithromycin (Hives; Pruritus)      FAMILY HISTORY:  Family history of hypertension (Sibling): sister  Family history of diabetes mellitus (Sibling): brothers/sisters      Social History:  Smoking History: Denies  Alcohol Use: Denies  Drug Use: Denies    Review of Systems:  REVIEW OF SYSTEMS:  CONSTITUTIONAL: No weakness, fevers or chills  EYES/ENT: No visual changes;  No dysphagia  NECK: No pain or stiffness  RESPIRATORY: No cough, wheezing, hemoptysis; No shortness of breath  CARDIOVASCULAR: No chest pain or palpitations; No lower extremity edema  GASTROINTESTINAL: No abdominal or epigastric pain. No nausea, vomiting, or hematemesis; No diarrhea or constipation. No melena or hematochezia.  BACK: No back pain  GENITOURINARY: No dysuria, frequency or hematuria  NEUROLOGICAL: No numbness or weakness  SKIN: No itching, burning, rashes, or lesions   All other review of systems is negative unless indicated above.    Physical Exam:  T(F): 97.7 (10-14), Max: 98 (10-14)  HR: 76 (10-14) (62 - 136)  BP: 95/57 (10-14) (95/57 - 95/57)  RR: 22 (10-14)  SpO2: 99% (10-14)  General: elderly woman lying in bed, appears tachypneic, slightly uncomfortable not fully answering questions  HEENT: PERRLA, EOMI, moist mucous membranes  Respiratory: clear bilaterally   CV: RRR, S1S2, no murmurs, rubs or gallops  Abdominal: Soft, nontender, nondistended  Extremities: no edema, + peripheral pulses    Cardiovascular Diagnostic Testing:    Echo:  TTE 10/13:  Conclusions:  1. S/P MitraClip. Mild mitral regurgitation.  Mean  transmitral valve gradient equals 4 mm Hg. (HR 62)  2. Despite intravenous injection of Ultrasonic Enhancing  Agent (Definity) endocardial visualization is limited.  Grossly severe left ventricular systolic dysfunction.  3. Normal right ventricular size with decreased right  ventricular systolic function.  4. Normal tricuspid valve. Mild-moderate tricuspid  regurgitation.  5. Estimated pulmonary artery systolic pressure equals 49  mm Hg, assuming right atrial pressure equals 8 mm Hg,  consistent with mild pulmonary pressures.  ------------------------------------------------------------------------  Confirmed on  10/13/2019 - 15:35:24 by Busrha Werner M.D.  ------------------------------------------------------------------------        Stress Testing:    Cath:    Interpretation of Telemetry:    Imaging:    Labs: Personally reviewed                        9.1    10.86 )-----------( 166      ( 14 Oct 2019 05:50 )             27.8     10-14    136  |  98  |  104<H>  ----------------------------<  385<H>  4.0   |  20<L>  |  2.29<H>    Ca    9.4      14 Oct 2019 05:51  Phos  5.3     10-14  Mg     2.8     10-14    TPro  7.5  /  Alb  3.6  /  TBili  0.4  /  DBili  x   /  AST  84<H>  /  ALT  502<H>  /  AlkPhos  105  10-14      CARDIAC MARKERS ( 14 Oct 2019 05:51 )  x     / x     / 22 U/L / x     / 3.1 ng/mL  CARDIAC MARKERS ( 13 Oct 2019 23:46 )  x     / x     / 22 U/L / x     / 2.8 ng/mL  CARDIAC MARKERS ( 13 Oct 2019 16:48 )  x     / x     / 27 U/L / x     / 2.8 ng/mL      Serum Pro-Brain Natriuretic Peptide: 09135 pg/mL (10-08 @ 09:36)  Serum Pro-Brain Natriuretic Peptide: 03872 pg/mL (10-08 @ 00:40)  Serum Pro-Brain Natriuretic Peptide: 33612 pg/mL (10-07 @ 22:28)      Hemoglobin A1C, Whole Blood: 7.5 % (10-08 @ 14:30)    Thyroid Stimulating Hormone, Serum: 1.01 uIU/mL (10-08 @ 14:47)

## 2019-10-14 NOTE — ASSESSMENT
[FreeTextEntry1] : Ms. Gamino is a 72 yo woman with PMHx of HTN, T2DM (A1c 7.4%), CAD s/p CABG (LIMA to LAD, SVG to OM, SVG to PDA 2014 at [facility]), non-dilated ICM (EF 20-25%), severe mitral regurgitation s/p mitral clip (6/13/19 Dr. Newman), severe pulm HTN, CKD (b/l Cr 1.8-2), and hypothyroidism, who is here for follow up post prolonged hospitalization for ADHF. She is currently ACC/AHA Stage D, with NYHA Class IV HF symptoms, volume overloaded with MERVIN on CKD as of most recent labs prior to discharge. Her BP is marginal on low dose neurohormonal antagonists. I have recommended the following:\par \par 1. acute on chronic systolic heart failure: \par - Continue bumex 4mg PO BID. Will add metolazone 2.5mg x 1 tomorrow morning and call to reassess response over next 48 hours to determine further diuretic regimen vs need for inpatient admission\par - Will recheck labs to reassess renal function and electrolytes\par - Continue current dose of Toprol XL 25mg daily, hydralazine 25mg TID, isosorbide mononitrate 30mg daily at this time\par - No ACE/ARB/ARNI at this time given MERVIN on CKD\par \par 2. s/p Mitraclip\par - MR noted to be mild on most recent TTE s/p mitraclip\par \par 3. MERVIN on CKD\par - Will reassess with labs today\par - Diuretics as above\par \par 4. CAD\par - Stable without s/s of active ischemia\par - Continue ASA, statin, BB, plavix\par \par RTC in 2 weeks with HF NP, then with Dr. Gregg in 1 month

## 2019-10-14 NOTE — PHYSICAL EXAM
[Eyelids - No Xanthelasma] : the eyelids demonstrated no xanthelasmas [No Oral Cyanosis] : no oral cyanosis [No Oral Pallor] : no oral pallor [] : no respiratory distress [Respiration, Rhythm And Depth] : normal respiratory rhythm and effort [Heart Sounds] : normal S1 and S2 [Murmurs] : no murmurs present [Heart Rate And Rhythm] : heart rate and rhythm were normal [1+] : left 1+ [Bowel Sounds] : normal bowel sounds [Abdomen Soft] : soft [Abdomen Tenderness] : non-tender [Nail Clubbing] : no clubbing of the fingernails [Cyanosis, Localized] : no localized cyanosis [Skin Color & Pigmentation] : normal skin color and pigmentation [Skin Turgor] : normal skin turgor [Oriented To Time, Place, And Person] : oriented to person, place, and time [FreeTextEntry1] : Depressed mood

## 2019-10-14 NOTE — PROGRESS NOTE ADULT - SUBJECTIVE AND OBJECTIVE BOX
CHIEF COMPLAINT: patient was seen today sitting in the bed awake with verbal but seems to be depressed without cough or chest pain or other symptom  patient is 73-year-old female who was admitted secondary to shortness of breath2/2 acute decompensated heart failure  SUBJECTIVE:  she is still hypotensive on low-dose vasopressin      REVIEW OF SYSTEMS:     CONSTITUTIONAL: ( x )  weakness,  (  ) fevers or chills  EYES/ENT: (  )visual changes;     NECK: (  ) pain or stiffness  RESPIRATORY:   (  )cough, wheezing, hemoptysis;  (  ) shortness of breath  CARDIOVASCULAR:  (  )chest pain or palpitations  GASTROINTESTINAL:   (  )abdominal or epigastric pain.  (  ) nausea, vomiting, or hematemesis;   (   ) diarrhea or constipation.   GENITOURINARY:   (    ) dysuria, frequency or hematuria  NEUROLOGICAL:  (   ) numbness or weakness   All other review of systems is negative unless indicated above    Vital Signs Last 24 Hrs  T(C): 37 (14 Oct 2019 16:00), Max: 37 (14 Oct 2019 16:00)  T(F): 98.6 (14 Oct 2019 16:00), Max: 98.6 (14 Oct 2019 16:00)  HR: 84 (14 Oct 2019 18:30) (62 - 136)  BP: 95/57 (14 Oct 2019 08:00) (95/57 - 95/57)  BP(mean): 74 (14 Oct 2019 08:00) (74 - 74)  RR: 20 (14 Oct 2019 18:30) (14 - 33)  SpO2: 98% (14 Oct 2019 18:30) (94% - 100%)    I&O's Summary    13 Oct 2019 07:01  -  14 Oct 2019 07:00  --------------------------------------------------------  IN: 1281.2 mL / OUT: 825 mL / NET: 456.2 mL    14 Oct 2019 07:01  -  14 Oct 2019 19:34  --------------------------------------------------------  IN: 1013.6 mL / OUT: 650 mL / NET: 363.6 mL        CAPILLARY BLOOD GLUCOSE      POCT Blood Glucose.: 387 mg/dL (14 Oct 2019 17:06)  POCT Blood Glucose.: 418 mg/dL (14 Oct 2019 12:17)  POCT Blood Glucose.: 428 mg/dL (14 Oct 2019 11:01)  POCT Blood Glucose.: 414 mg/dL (14 Oct 2019 10:59)  POCT Blood Glucose.: 383 mg/dL (14 Oct 2019 08:06)  POCT Blood Glucose.: 377 mg/dL (13 Oct 2019 20:58)      PHYSICAL EXAM:      Constitutional:  ( x  ) NAD,   ( x  )awake and alert ill  appearing woman  HEENT: PERR, EOMI,    Neck: Soft and supple, No LAD, No JVD  Respiratory:  (  x  Breath sounds are clear bilaterally,    (   ) wheezing, rales or rhonchi  Cardiovascular:     (x   )S1 and S2, regular rate and rhythm, no Murmurs, gallops or rubs  Gastrointestinal:  ( x  )Bowel Sounds present, soft,   (  )nontender, nondistended,    Extremities:    (  ) peripheral edema  Vascular: 2+ peripheral pulses  Neurological:    ( x   )A/O x 3,   (  ) focal deficits  Musculoskeletal:    (   )  normal strength b/l upper  (     ) normal  lower extremities  Skin: No rashes      MEDICATIONS:  MEDICATIONS  (STANDING):  aspirin enteric coated 81 milliGRAM(s) Oral daily  atorvastatin 80 milliGRAM(s) Oral at bedtime  cefepime   IVPB 1000 milliGRAM(s) IV Intermittent every 24 hours  chlorhexidine 2% Cloths 1 Application(s) Topical <User Schedule>  clopidogrel Tablet 75 milliGRAM(s) Oral daily  dextrose 5%. 1000 milliLiter(s) (50 mL/Hr) IV Continuous <Continuous>  dextrose 50% Injectable 12.5 Gram(s) IV Push once  dextrose 50% Injectable 25 Gram(s) IV Push once  dextrose 50% Injectable 25 Gram(s) IV Push once  docusate sodium 100 milliGRAM(s) Oral two times a day  heparin  Injectable 5000 Unit(s) SubCutaneous every 12 hours  insulin glargine Injectable (LANTUS) 60 Unit(s) SubCutaneous at bedtime  insulin lispro (HumaLOG) corrective regimen sliding scale   SubCutaneous three times a day before meals  insulin lispro (HumaLOG) corrective regimen sliding scale   SubCutaneous at bedtime  insulin lispro Injectable (HumaLOG) 18 Unit(s) SubCutaneous three times a day before meals  levothyroxine 50 MICROGram(s) Oral daily  melatonin 5 milliGRAM(s) Oral at bedtime  metoprolol tartrate 12.5 milliGRAM(s) Oral two times a day  midodrine 10 milliGRAM(s) Oral every 8 hours  pantoprazole    Tablet 40 milliGRAM(s) Oral before breakfast  polyethylene glycol 3350 17 Gram(s) Oral daily  senna 2 Tablet(s) Oral at bedtime      LABS: All Labs Reviewed:                        9.1    10.86 )-----------( 166      ( 14 Oct 2019 05:50 )             27.8     10-14    136  |  98  |  104<H>  ----------------------------<  385<H>  4.0   |  20<L>  |  2.29<H>    Ca    9.4      14 Oct 2019 05:51  Phos  5.3     10-14  Mg     2.8     10-14    TPro  6.9  /  Alb  x   /  TBili  x   /  DBili  x   /  AST  x   /  ALT  x   /  AlkPhos  x   10-14    CARDIAC MARKERS ( 14 Oct 2019 05:51 )  x     / x     / 22 U/L / x     / 3.1 ng/mL  CARDIAC MARKERS ( 13 Oct 2019 23:46 )  x     / x     / 22 U/L / x     / 2.8 ng/mL  CARDIAC MARKERS ( 13 Oct 2019 16:48 )  x     / x     / 27 U/L / x     / 2.8 ng/mL      Blood Culture:   Urine Culture      RADIOLOGY/EKG:    ASSESSMENT AND PLAN:  74 y/o F w/ PMH of HTN, HLD, DM2, GERD, CAD s/p CABG (LIMA to LAD, SVG to OM, SVG to PDA; 2014), severe MR s/p MitraClip, severe pHTN, CKD4, hypoTH c/o dyspnea x 2 days suspect 2/2  NSTEMI +/- ADHF w/ e/o organ hypoperfusion.    #cardiovascular  NSTEMI, acute pulmonary edema, elevated lactic acid levels.  Elevated biomarkers and transaminitis.  Paroxysmal SVT noted.  RHC and IABP insertion. IIABP weaned yesterday. Patient now hypotensive requiring pressor support.       #Neuro - No active issues  #Resp - Off supplemental O2, sating well on RA. Vol optimize, as above.  #GI - Heart healthy diet. Trend liver tests, downtrending   #Endo -  Her blood sugar elevated today  off   IV insulin 4 cc/h     currently she is on  insulin glargine Injectable (LANTUS) 60 Unit(s) SubCutaneous at bedtime  insulin lispro (HumaLOG) corrective regimen sliding scale   SubCutaneous three times a day before meals  insulin lispro (HumaLOG) corrective regimen sliding scale   SubCutaneous at bedtime  insulin lispro Injectable (HumaLOG) 18 Unit(s) SubCutaneous three times a day before meals       #Renal - Vol optimize, as above. MERVIN likely 2/2 acute congestion. continue to monitor creatinine.    #ID - Leukocytosis. Sepsis w/u unremarkable thus far- BCx and UCx NGTD; c/w Cefepime given MERVIN, to complete 7 day course on 10/14  #Hem - HSQ for DVT PPX. iron studies. will transfuse 1 unit PRBCS     DVT PPX:    ADVANCED DIRECTIVE:    DISPOSITION:

## 2019-10-14 NOTE — PROGRESS NOTE ADULT - ASSESSMENT
72 y/o F w/ PMH of HTN, HLD, DM2, GERD, CAD s/p CABG (LIMA to LAD, SVG to OM, SVG to PDA; 2014), severe MR s/p MitraClip, severe pHTN, CKD4, hypoTH c/o dyspnea x 2 days suspect 2/2  NSTEMI +/- ADHF w/ e/o organ hypoperfusion.    #CV  Vessels - Elev troponin, possible NSTEMI. trops downtrending S/P hep gtt.  S/p IABP to improve coronary perfusion. per HF team not a candidate for LHC in light of poor renal function, will cw medical mgmt. C/w ASA/clopidogrel, statin  Pump - Severe reduced LVEF. s/p Bumetanide gtt. hypotension requiring vasopressin since 10/12--weaning today to midodrine 10 mg Q8h. on exam perfusing well, lactate improved. low concern for cardiogenic shock.   low concern for sepsis as cultures negative and pt on IV abx. AM cortisol normal. s/p 1 unit PRBCS with good response.   Valves - S/p MitraClip. No active issues.  Rhythm - on tele. s/p SVT with aberrancy last week which responded to adenosine, o/n 10/13 with SVT but was given metoprolol --> if recurs, assess BP --> adenosine vs beta blocker. will consult EP.    #Neuro - No active issues  #Resp - Off supplemental O2, sating well on RA. Vol optimize, as above.  #GI - Heart healthy diet. Trend liver tests, downtrending   #Endo - Trend BGFS on Lantus and Humalog. endo recs appreciated - high blg glc > 400 today 10/13 --> additional insulin given and endocrine aware. increasing Lantus today.   C/w home med Synthroid.   #Renal - Vol optimize, as above. MERVIN likely 2/2 prerenal from heart strain. continue to monitor creatinine - downtrending  #ID - Leukocytosis resolving. Sepsis w/u unremarkable thus far- BCx and UCx NGTD; c/w Cefepime given MERVIN, to complete 7 day course on 10/14.  #Hem - HSQ for DVT PPX. Anemia- iron studies showing normal ferritin/iron binding capacity- ?CHRISTOPHE. normal hemolysis labs. s/p 1 unit PRBCS for Hb < 8 --> responded appropriately to > 9  #Ethics - FC. 74 y/o F w/ PMH of HTN, HLD, DM2, GERD, CAD s/p CABG (LIMA to LAD, SVG to OM, SVG to PDA; 2014), severe MR s/p MitraClip, severe pHTN, CKD4, hypoTH c/o dyspnea x 2 days suspect 2/2  NSTEMI +/- ADHF w/ e/o organ hypoperfusion.    #CV  Vessels - NSTEMI vs. T2MI. s/p IABP to improve coronary perfusion. per HF team not a candidate for LHC in light of poor renal function, c/w medical mgmt. Continue DAPT(ASA/clopidogrel), statin  Pump - Severe LV dysfunction. s/p Bumetanide gtt. hypotension requiring vasopressin (10/12-14) now de-escalated to midodrine 10mg TID. AM cortisol normal. s/p 1 unit PRBCS with good response.   Valves - S/p MitraClip. No active issues.  Rhythm - on tele. s/p SVT with aberrancy last week which responded to adenosine, o/n 10/13 with SVT but was given metoprolol --> if recurs, assess BP --> adenosine vs beta blocker. will consult EP.    #Neuro - No active issues  #Resp - Off supplemental O2, sating well on RA. Vol optimize, as above.  #GI - Heart healthy diet. Trend liver tests, downtrending   #Endo - check BG qHS and qAC for DM. on Lantus and Humalog. endo recs appreciated. c/w home synthyroid  #Renal - Vol optimize, as above. MERVIN now resolved  #ID - Leukocytosis resolving. BCx and UCx NGTD; s/p Cefepime 7 day course (complete on 10/15)   #Hem - SQH for DVT ppx. Normocytic anemia with increased width. Normal iron study and hemolysis lab. s/p 1 unit pRBCS w/ appropriate response.  #Ethics - FC. 72 y/o F w/ PMH of HTN, HLD, DM2, GERD, CAD s/p CABG (LIMA to LAD, SVG to OM, SVG to PDA; 2014), severe MR s/p MitraClip, severe pHTN, CKD4, hypoTH c/o dyspnea x 2 days suspect 2/2  NSTEMI +/- ADHF w/ e/o organ hypoperfusion.    #CV  Vessels - NSTEMI vs. T2MI. s/p IABP to improve coronary perfusion. per HF team not a candidate for LHC in light of poor renal function, c/w medical mgmt. Continue DAPT(ASA/clopidogrel), statin  Pump - Severe LV dysfunction. s/p Bumetanide gtt. hypotension requiring vasopressin (10/12-14) now de-escalated to midodrine 10mg TID. AM cortisol normal. s/p 1 unit PRBCS with good response.   Valves - S/p MitraClip. No active issues.  Rhythm - on tele. s/p SVT with aberrancy last week which responded to adenosine. EP following. on metoprolol 12.5mg BID    #Neuro - No active issues  #Resp - Off supplemental O2, sating well on RA. Vol optimize, as above.  #GI - Heart healthy diet. Trend liver tests, downtrending   #Endo - check BG qHS and qAC for DM. on Lantus and Humalog. endo recs appreciated. c/w home synthyroid  #Renal - Vol optimize, as above. MERVIN now resolved  #ID - Leukocytosis resolving. BCx and UCx NGTD; s/p Cefepime 7 day course (complete on 10/15)   #Hem - SQH for DVT ppx. Normocytic anemia with increased width. Normal iron study and hemolysis lab. s/p 1 unit pRBCS w/ appropriate response.  #Ethics - FC.

## 2019-10-15 ENCOUNTER — TRANSCRIPTION ENCOUNTER (OUTPATIENT)
Age: 74
End: 2019-10-15

## 2019-10-15 ENCOUNTER — APPOINTMENT (OUTPATIENT)
Dept: CARDIOLOGY | Facility: CLINIC | Age: 74
End: 2019-10-15

## 2019-10-15 LAB
% ALBUMIN: 48.1 % — SIGNIFICANT CHANGE UP
% ALBUMIN: 48.6 % — SIGNIFICANT CHANGE UP
% ALPHA 1: 6 % — SIGNIFICANT CHANGE UP
% ALPHA 1: 6.4 % — SIGNIFICANT CHANGE UP
% ALPHA 2: 10.5 % — SIGNIFICANT CHANGE UP
% ALPHA 2: 11.5 % — SIGNIFICANT CHANGE UP
% BETA: 13.9 % — SIGNIFICANT CHANGE UP
% BETA: 14.4 % — SIGNIFICANT CHANGE UP
% GAMMA: 20.1 % — SIGNIFICANT CHANGE UP
% GAMMA: 20.5 % — SIGNIFICANT CHANGE UP
% M SPIKE: SIGNIFICANT CHANGE UP
% M SPIKE: SIGNIFICANT CHANGE UP
ALBUMIN SERPL ELPH-MCNC: 3.4 G/DL — LOW (ref 3.6–5.5)
ALBUMIN SERPL ELPH-MCNC: 3.4 G/DL — LOW (ref 3.6–5.5)
ALBUMIN SERPL ELPH-MCNC: 3.5 G/DL — SIGNIFICANT CHANGE UP (ref 3.3–5)
ALBUMIN/GLOB SERPL ELPH: 0.9 RATIO — SIGNIFICANT CHANGE UP
ALBUMIN/GLOB SERPL ELPH: 1 RATIO — SIGNIFICANT CHANGE UP
ALP SERPL-CCNC: 103 U/L — SIGNIFICANT CHANGE UP (ref 40–120)
ALPHA1 GLOB SERPL ELPH-MCNC: 0.4 G/DL — SIGNIFICANT CHANGE UP (ref 0.1–0.4)
ALPHA1 GLOB SERPL ELPH-MCNC: 0.4 G/DL — SIGNIFICANT CHANGE UP (ref 0.1–0.4)
ALPHA2 GLOB SERPL ELPH-MCNC: 0.7 G/DL — SIGNIFICANT CHANGE UP (ref 0.5–1)
ALPHA2 GLOB SERPL ELPH-MCNC: 0.8 G/DL — SIGNIFICANT CHANGE UP (ref 0.5–1)
ALT FLD-CCNC: 371 U/L — HIGH (ref 10–45)
ANION GAP SERPL CALC-SCNC: 17 MMOL/L — SIGNIFICANT CHANGE UP (ref 5–17)
AST SERPL-CCNC: 74 U/L — HIGH (ref 10–40)
AUTO DIFF PNL BLD: ABNORMAL
B-GLOBULIN SERPL ELPH-MCNC: 1 G/DL — SIGNIFICANT CHANGE UP (ref 0.5–1)
B-GLOBULIN SERPL ELPH-MCNC: 1 G/DL — SIGNIFICANT CHANGE UP (ref 0.5–1)
BILIRUB SERPL-MCNC: 0.5 MG/DL — SIGNIFICANT CHANGE UP (ref 0.2–1.2)
BUN SERPL-MCNC: 89 MG/DL — HIGH (ref 7–23)
C-ANCA SER-ACNC: NEGATIVE — SIGNIFICANT CHANGE UP
CALCIUM SERPL-MCNC: 9.3 MG/DL — SIGNIFICANT CHANGE UP (ref 8.4–10.5)
CHLORIDE SERPL-SCNC: 105 MMOL/L — SIGNIFICANT CHANGE UP (ref 96–108)
CO2 SERPL-SCNC: 19 MMOL/L — LOW (ref 22–31)
CREAT ?TM UR-MCNC: 40 MG/DL — SIGNIFICANT CHANGE UP
CREAT SERPL-MCNC: 2.09 MG/DL — HIGH (ref 0.5–1.3)
GAMMA GLOBULIN: 1.4 G/DL — SIGNIFICANT CHANGE UP (ref 0.6–1.6)
GAMMA GLOBULIN: 1.5 G/DL — SIGNIFICANT CHANGE UP (ref 0.6–1.6)
GLUCOSE BLDC GLUCOMTR-MCNC: 174 MG/DL — HIGH (ref 70–99)
GLUCOSE BLDC GLUCOMTR-MCNC: 354 MG/DL — HIGH (ref 70–99)
GLUCOSE BLDC GLUCOMTR-MCNC: 64 MG/DL — LOW (ref 70–99)
GLUCOSE BLDC GLUCOMTR-MCNC: 64 MG/DL — LOW (ref 70–99)
GLUCOSE BLDC GLUCOMTR-MCNC: 82 MG/DL — SIGNIFICANT CHANGE UP (ref 70–99)
GLUCOSE BLDC GLUCOMTR-MCNC: 93 MG/DL — SIGNIFICANT CHANGE UP (ref 70–99)
GLUCOSE SERPL-MCNC: 240 MG/DL — HIGH (ref 70–99)
HCT VFR BLD CALC: 29.6 % — LOW (ref 34.5–45)
HGB BLD-MCNC: 9.3 G/DL — LOW (ref 11.5–15.5)
INTERPRETATION SERPL IFE-IMP: SIGNIFICANT CHANGE UP
INTERPRETATION SERPL IFE-IMP: SIGNIFICANT CHANGE UP
LACTATE SERPL-SCNC: 1.6 MMOL/L — SIGNIFICANT CHANGE UP (ref 0.7–2)
M-SPIKE: SIGNIFICANT CHANGE UP (ref 0–0)
M-SPIKE: SIGNIFICANT CHANGE UP (ref 0–0)
MAGNESIUM SERPL-MCNC: 2.6 MG/DL — SIGNIFICANT CHANGE UP (ref 1.6–2.6)
MCHC RBC-ENTMCNC: 27 PG — SIGNIFICANT CHANGE UP (ref 27–34)
MCHC RBC-ENTMCNC: 31.4 GM/DL — LOW (ref 32–36)
MCV RBC AUTO: 85.8 FL — SIGNIFICANT CHANGE UP (ref 80–100)
NRBC # BLD: 0 /100 WBCS — SIGNIFICANT CHANGE UP (ref 0–0)
P-ANCA SER-ACNC: NEGATIVE — SIGNIFICANT CHANGE UP
PHOSPHATE SERPL-MCNC: 3.3 MG/DL — SIGNIFICANT CHANGE UP (ref 2.5–4.5)
PLATELET # BLD AUTO: 230 K/UL — SIGNIFICANT CHANGE UP (ref 150–400)
POTASSIUM SERPL-MCNC: 3.6 MMOL/L — SIGNIFICANT CHANGE UP (ref 3.5–5.3)
POTASSIUM SERPL-SCNC: 3.6 MMOL/L — SIGNIFICANT CHANGE UP (ref 3.5–5.3)
PROT ?TM UR-MCNC: 70 MG/DL — HIGH (ref 0–12)
PROT PATTERN SERPL ELPH-IMP: SIGNIFICANT CHANGE UP
PROT PATTERN SERPL ELPH-IMP: SIGNIFICANT CHANGE UP
PROT SERPL-MCNC: 7.1 G/DL — SIGNIFICANT CHANGE UP (ref 6–8.3)
PROT SERPL-MCNC: 7.1 G/DL — SIGNIFICANT CHANGE UP (ref 6–8.3)
PROT SERPL-MCNC: 7.4 G/DL — SIGNIFICANT CHANGE UP (ref 6–8.3)
PROT/CREAT UR-RTO: 1.8 RATIO — HIGH (ref 0–0.2)
RBC # BLD: 3.45 M/UL — LOW (ref 3.8–5.2)
RBC # FLD: 18 % — HIGH (ref 10.3–14.5)
SODIUM SERPL-SCNC: 141 MMOL/L — SIGNIFICANT CHANGE UP (ref 135–145)
WBC # BLD: 12.41 K/UL — HIGH (ref 3.8–10.5)
WBC # FLD AUTO: 12.41 K/UL — HIGH (ref 3.8–10.5)

## 2019-10-15 PROCEDURE — 99233 SBSQ HOSP IP/OBS HIGH 50: CPT

## 2019-10-15 PROCEDURE — 93010 ELECTROCARDIOGRAM REPORT: CPT

## 2019-10-15 PROCEDURE — 99232 SBSQ HOSP IP/OBS MODERATE 35: CPT

## 2019-10-15 RX ORDER — INSULIN GLARGINE 100 [IU]/ML
50 INJECTION, SOLUTION SUBCUTANEOUS AT BEDTIME
Refills: 0 | Status: DISCONTINUED | OUTPATIENT
Start: 2019-10-15 | End: 2019-10-18

## 2019-10-15 RX ORDER — BUMETANIDE 0.25 MG/ML
2 INJECTION INTRAMUSCULAR; INTRAVENOUS ONCE
Refills: 0 | Status: COMPLETED | OUTPATIENT
Start: 2019-10-15 | End: 2019-10-15

## 2019-10-15 RX ORDER — INSULIN LISPRO 100/ML
10 VIAL (ML) SUBCUTANEOUS
Refills: 0 | Status: DISCONTINUED | OUTPATIENT
Start: 2019-10-16 | End: 2019-10-18

## 2019-10-15 RX ORDER — POTASSIUM CHLORIDE 20 MEQ
40 PACKET (EA) ORAL ONCE
Refills: 0 | Status: COMPLETED | OUTPATIENT
Start: 2019-10-15 | End: 2019-10-15

## 2019-10-15 RX ADMIN — Medication 40 MILLIEQUIVALENT(S): at 05:25

## 2019-10-15 RX ADMIN — Medication 18 UNIT(S): at 08:31

## 2019-10-15 RX ADMIN — INSULIN GLARGINE 50 UNIT(S): 100 INJECTION, SOLUTION SUBCUTANEOUS at 22:28

## 2019-10-15 RX ADMIN — PANTOPRAZOLE SODIUM 40 MILLIGRAM(S): 20 TABLET, DELAYED RELEASE ORAL at 05:25

## 2019-10-15 RX ADMIN — BUMETANIDE 2 MILLIGRAM(S): 0.25 INJECTION INTRAMUSCULAR; INTRAVENOUS at 11:25

## 2019-10-15 RX ADMIN — ATORVASTATIN CALCIUM 80 MILLIGRAM(S): 80 TABLET, FILM COATED ORAL at 21:43

## 2019-10-15 RX ADMIN — Medication 4: at 11:49

## 2019-10-15 RX ADMIN — Medication 81 MILLIGRAM(S): at 11:28

## 2019-10-15 RX ADMIN — Medication 50 MICROGRAM(S): at 05:25

## 2019-10-15 RX ADMIN — Medication 5 MILLIGRAM(S): at 21:43

## 2019-10-15 RX ADMIN — Medication 18 UNIT(S): at 11:50

## 2019-10-15 RX ADMIN — Medication 0.25 MILLIGRAM(S): at 21:50

## 2019-10-15 RX ADMIN — MIDODRINE HYDROCHLORIDE 10 MILLIGRAM(S): 2.5 TABLET ORAL at 05:31

## 2019-10-15 RX ADMIN — POLYETHYLENE GLYCOL 3350 17 GRAM(S): 17 POWDER, FOR SOLUTION ORAL at 11:28

## 2019-10-15 RX ADMIN — CLOPIDOGREL BISULFATE 75 MILLIGRAM(S): 75 TABLET, FILM COATED ORAL at 11:28

## 2019-10-15 RX ADMIN — Medication 1: at 08:30

## 2019-10-15 RX ADMIN — Medication 100 MILLIGRAM(S): at 18:04

## 2019-10-15 RX ADMIN — MIDODRINE HYDROCHLORIDE 10 MILLIGRAM(S): 2.5 TABLET ORAL at 15:51

## 2019-10-15 RX ADMIN — HEPARIN SODIUM 5000 UNIT(S): 5000 INJECTION INTRAVENOUS; SUBCUTANEOUS at 05:25

## 2019-10-15 RX ADMIN — Medication 12.5 MILLIGRAM(S): at 05:25

## 2019-10-15 RX ADMIN — Medication 3: at 22:27

## 2019-10-15 RX ADMIN — Medication 12.5 MILLIGRAM(S): at 18:04

## 2019-10-15 RX ADMIN — Medication 100 MILLIGRAM(S): at 05:25

## 2019-10-15 RX ADMIN — CEFEPIME 100 MILLIGRAM(S): 1 INJECTION, POWDER, FOR SOLUTION INTRAMUSCULAR; INTRAVENOUS at 09:28

## 2019-10-15 RX ADMIN — SENNA PLUS 2 TABLET(S): 8.6 TABLET ORAL at 21:44

## 2019-10-15 RX ADMIN — CHLORHEXIDINE GLUCONATE 1 APPLICATION(S): 213 SOLUTION TOPICAL at 05:26

## 2019-10-15 RX ADMIN — MIDODRINE HYDROCHLORIDE 10 MILLIGRAM(S): 2.5 TABLET ORAL at 22:28

## 2019-10-15 RX ADMIN — HEPARIN SODIUM 5000 UNIT(S): 5000 INJECTION INTRAVENOUS; SUBCUTANEOUS at 18:04

## 2019-10-15 NOTE — PROGRESS NOTE ADULT - SUBJECTIVE AND OBJECTIVE BOX
PATIENT:  SELVIN CLAIRE  [id number]    CHIEF COMPLAINT:  Patient is a 73y old  Female who presents with a chief complaint of Acute decompensated heart failure, SOB (14 Oct 2019 22:24)      INTERVAL HISTORY/OVERNIGHT EVENTS:      REVIEW OF SYSTEMS:    Constitutional:     [ ] negative [ ] fevers [ ] chills [ ] weight loss [ ] weight gain  HEENT:                  [ ] negative [ ] dry eyes [ ] eye irritation [ ] postnasal drip [ ] nasal congestion  CV:                         [ ] negative  [ ] chest pain [ ] orthopnea [ ] palpitations [ ] murmur  Resp:                     [ ] negative [ ] cough [ ] shortness of breath [ ] dyspnea [ ] wheezing [ ] sputum [ ] hemoptysis  GI:                          [ ] negative [ ] nausea [ ] vomiting [ ] diarrhea [ ] constipation [ ] abd pain [ ] dysphagia   :                        [ ] negative [ ] dysuria [ ] nocturia [ ] hematuria [ ] increased urinary frequency  Musculoskeletal: [ ] negative [ ] back pain [ ] myalgias [ ] arthralgias [ ] fracture  Skin:                       [ ] negative [ ] rash [ ] itch  Neurological:        [ ] negative [ ] headache [ ] dizziness [ ] syncope [ ] weakness [ ] numbness  Psychiatric:           [ ] negative [ ] anxiety [ ] depression  Endocrine:            [ ] negative [ ] diabetes [ ] thyroid problem  Heme/Lymph:      [ ] negative [ ] anemia [ ] bleeding problem  Allergic/Immune: [ ] negative [ ] itchy eyes [ ] nasal discharge [ ] hives [ ] angioedema    [ ] All other systems negative  [ ] Unable to assess ROS because ________.    MEDICATIONS:  MEDICATIONS  (STANDING):  aspirin enteric coated 81 milliGRAM(s) Oral daily  atorvastatin 80 milliGRAM(s) Oral at bedtime  cefepime   IVPB 1000 milliGRAM(s) IV Intermittent every 24 hours  chlorhexidine 2% Cloths 1 Application(s) Topical <User Schedule>  clopidogrel Tablet 75 milliGRAM(s) Oral daily  dextrose 5%. 1000 milliLiter(s) (50 mL/Hr) IV Continuous <Continuous>  dextrose 50% Injectable 12.5 Gram(s) IV Push once  dextrose 50% Injectable 25 Gram(s) IV Push once  dextrose 50% Injectable 25 Gram(s) IV Push once  docusate sodium 100 milliGRAM(s) Oral two times a day  heparin  Injectable 5000 Unit(s) SubCutaneous every 12 hours  insulin glargine Injectable (LANTUS) 60 Unit(s) SubCutaneous at bedtime  insulin lispro (HumaLOG) corrective regimen sliding scale   SubCutaneous three times a day before meals  insulin lispro (HumaLOG) corrective regimen sliding scale   SubCutaneous at bedtime  insulin lispro Injectable (HumaLOG) 18 Unit(s) SubCutaneous three times a day before meals  levothyroxine 50 MICROGram(s) Oral daily  melatonin 5 milliGRAM(s) Oral at bedtime  metoprolol tartrate 12.5 milliGRAM(s) Oral two times a day  midodrine 10 milliGRAM(s) Oral every 8 hours  pantoprazole    Tablet 40 milliGRAM(s) Oral before breakfast  polyethylene glycol 3350 17 Gram(s) Oral daily  senna 2 Tablet(s) Oral at bedtime    MEDICATIONS  (PRN):  ALPRAZolam 0.25 milliGRAM(s) Oral three times a day PRN Anxiety  dextrose 40% Gel 15 Gram(s) Oral once PRN Blood Glucose LESS THAN 70 milliGRAM(s)/deciliter  glucagon  Injectable 1 milliGRAM(s) IntraMuscular once PRN Glucose LESS THAN 70 milligrams/deciliter      ALLERGIES:  Allergies    azithromycin (Hives; Pruritus)    Intolerances        OBJECTIVE:  ICU Vital Signs Last 24 Hrs  T(C): 36.7 (15 Oct 2019 05:30), Max: 37 (14 Oct 2019 16:00)  T(F): 98.1 (15 Oct 2019 05:30), Max: 98.6 (14 Oct 2019 16:00)  HR: 72 (15 Oct 2019 06:00) (62 - 84)  BP: 95/57 (14 Oct 2019 08:00) (95/57 - 95/57)  BP(mean): 74 (14 Oct 2019 08:00) (74 - 74)  ABP: 120/58 (15 Oct 2019 06:00) (90/44 - 130/56)  ABP(mean): 82 (15 Oct 2019 06:00) (54 - 100)  RR: 21 (15 Oct 2019 06:00) (18 - 31)  SpO2: 100% (15 Oct 2019 06:00) (94% - 100%)      Adult Advanced Hemodynamics Last 24 Hrs  CVP(mm Hg): --  CVP(cm H2O): --  CO: --  CI: --  PA: --  PA(mean): --  PCWP: --  SVR: --  SVRI: --  PVR: --  PVRI: --  CAPILLARY BLOOD GLUCOSE      POCT Blood Glucose.: 381 mg/dL (14 Oct 2019 21:59)  POCT Blood Glucose.: 387 mg/dL (14 Oct 2019 17:06)  POCT Blood Glucose.: 418 mg/dL (14 Oct 2019 12:17)  POCT Blood Glucose.: 428 mg/dL (14 Oct 2019 11:01)  POCT Blood Glucose.: 414 mg/dL (14 Oct 2019 10:59)  POCT Blood Glucose.: 383 mg/dL (14 Oct 2019 08:06)    CAPILLARY BLOOD GLUCOSE      POCT Blood Glucose.: 381 mg/dL (14 Oct 2019 21:59)    I&O's Summary    14 Oct 2019 07:01  -  15 Oct 2019 07:00  --------------------------------------------------------  IN: 1253.6 mL / OUT: 1025 mL / NET: 228.6 mL      Daily     Daily Weight in k.1 (15 Oct 2019 05:30)    PHYSICAL EXAMINATION:  General: WN/WD NAD  HEENT: PERRLA, EOMI, moist mucous membranes  Neurology: A&Ox3, nonfocal, CUADRA x 4  Respiratory: CTA B/L, normal respiratory effort, no wheezes, crackles, rales  CV: RRR, S1S2, no murmurs, rubs or gallops  Abdominal: Soft, NT, ND +BS, Last BM  Extremities: No edema, + peripheral pulses  Incisions:   Tubes:    LABS:                          9.3    12.41 )-----------( 230      ( 15 Oct 2019 04:19 )             29.6     10-15    141  |  105  |  89<H>  ----------------------------<  240<H>  3.6   |  19<L>  |  2.09<H>    Ca    9.3      15 Oct 2019 04:19  Phos  3.3     10-15  Mg     2.6     10-15    TPro  7.4  /  Alb  3.5  /  TBili  0.5  /  DBili  x   /  AST  74<H>  /  ALT  371<H>  /  AlkPhos  103  10-15    LIVER FUNCTIONS - ( 15 Oct 2019 04:19 )  Alb: 3.5 g/dL / Pro: 7.4 g/dL / ALK PHOS: 103 U/L / ALT: 371 U/L / AST: 74 U/L / GGT: x               CARDIAC MARKERS ( 14 Oct 2019 05:51 )  x     / x     / 22 U/L / x     / 3.1 ng/mL  CARDIAC MARKERS ( 13 Oct 2019 23:46 )  x     / x     / 22 U/L / x     / 2.8 ng/mL  CARDIAC MARKERS ( 13 Oct 2019 16:48 )  x     / x     / 27 U/L / x     / 2.8 ng/mL          TELEMETRY:     EKG:     IMAGING: PATIENT:  SELVIN CLAIRE  [id number]    CHIEF COMPLAINT:  Patient is a 73y old  Female who presents with a chief complaint of Acute decompensated heart failure, SOB (14 Oct 2019 22:24)      INTERVAL HISTORY/OVERNIGHT EVENTS: No acute events overnight. Pt remains off vasopressin.       MEDICATIONS:  MEDICATIONS  (STANDING):  aspirin enteric coated 81 milliGRAM(s) Oral daily  atorvastatin 80 milliGRAM(s) Oral at bedtime  cefepime   IVPB 1000 milliGRAM(s) IV Intermittent every 24 hours  chlorhexidine 2% Cloths 1 Application(s) Topical <User Schedule>  clopidogrel Tablet 75 milliGRAM(s) Oral daily  dextrose 5%. 1000 milliLiter(s) (50 mL/Hr) IV Continuous <Continuous>  dextrose 50% Injectable 12.5 Gram(s) IV Push once  dextrose 50% Injectable 25 Gram(s) IV Push once  dextrose 50% Injectable 25 Gram(s) IV Push once  docusate sodium 100 milliGRAM(s) Oral two times a day  heparin  Injectable 5000 Unit(s) SubCutaneous every 12 hours  insulin glargine Injectable (LANTUS) 60 Unit(s) SubCutaneous at bedtime  insulin lispro (HumaLOG) corrective regimen sliding scale   SubCutaneous three times a day before meals  insulin lispro (HumaLOG) corrective regimen sliding scale   SubCutaneous at bedtime  insulin lispro Injectable (HumaLOG) 18 Unit(s) SubCutaneous three times a day before meals  levothyroxine 50 MICROGram(s) Oral daily  melatonin 5 milliGRAM(s) Oral at bedtime  metoprolol tartrate 12.5 milliGRAM(s) Oral two times a day  midodrine 10 milliGRAM(s) Oral every 8 hours  pantoprazole    Tablet 40 milliGRAM(s) Oral before breakfast  polyethylene glycol 3350 17 Gram(s) Oral daily  senna 2 Tablet(s) Oral at bedtime    MEDICATIONS  (PRN):  ALPRAZolam 0.25 milliGRAM(s) Oral three times a day PRN Anxiety  dextrose 40% Gel 15 Gram(s) Oral once PRN Blood Glucose LESS THAN 70 milliGRAM(s)/deciliter  glucagon  Injectable 1 milliGRAM(s) IntraMuscular once PRN Glucose LESS THAN 70 milligrams/deciliter      ALLERGIES:  Allergies    azithromycin (Hives; Pruritus)    Intolerances        OBJECTIVE:  ICU Vital Signs Last 24 Hrs  T(C): 36.7 (15 Oct 2019 05:30), Max: 37 (14 Oct 2019 16:00)  T(F): 98.1 (15 Oct 2019 05:30), Max: 98.6 (14 Oct 2019 16:00)  HR: 72 (15 Oct 2019 06:00) (62 - 84)  BP: 95/57 (14 Oct 2019 08:00) (95/57 - 95/57)  BP(mean): 74 (14 Oct 2019 08:00) (74 - 74)  ABP: 120/58 (15 Oct 2019 06:00) (90/44 - 130/56)  ABP(mean): 82 (15 Oct 2019 06:00) (54 - 100)  RR: 21 (15 Oct 2019 06:00) (18 - 31)  SpO2: 100% (15 Oct 2019 06:00) (94% - 100%)      Adult Advanced Hemodynamics Last 24 Hrs  CVP(mm Hg): --  CVP(cm H2O): --  CO: --  CI: --  PA: --  PA(mean): --  PCWP: --  SVR: --  SVRI: --  PVR: --  PVRI: --  CAPILLARY BLOOD GLUCOSE      POCT Blood Glucose.: 381 mg/dL (14 Oct 2019 21:59)  POCT Blood Glucose.: 387 mg/dL (14 Oct 2019 17:06)  POCT Blood Glucose.: 418 mg/dL (14 Oct 2019 12:17)  POCT Blood Glucose.: 428 mg/dL (14 Oct 2019 11:01)  POCT Blood Glucose.: 414 mg/dL (14 Oct 2019 10:59)  POCT Blood Glucose.: 383 mg/dL (14 Oct 2019 08:06)    CAPILLARY BLOOD GLUCOSE      POCT Blood Glucose.: 381 mg/dL (14 Oct 2019 21:59)    I&O's Summary    14 Oct 2019 07:01  -  15 Oct 2019 07:00  --------------------------------------------------------  IN: 1253.6 mL / OUT: 1025 mL / NET: 228.6 mL      Daily     Daily Weight in k.1 (15 Oct 2019 05:30)      PHYSICAL EXAMINATION:  General: elderly woman lying in bed, appears comfortable  HEENT: PERRLA, EOMI, moist mucous membranes  Respiratory: clear bilaterally   CV: RRR, S1S2, no murmurs, rubs or gallops  Abdominal: Soft, nontender, nondistended  Extremities: no edema, + peripheral pulses      LABS:                          9.3    12.41 )-----------( 230      ( 15 Oct 2019 04:19 )             29.6     10-15    141  |  105  |  89<H>  ----------------------------<  240<H>  3.6   |  19<L>  |  2.09<H>    Ca    9.3      15 Oct 2019 04:19  Phos  3.3     10-15  Mg     2.6     10-15    TPro  7.4  /  Alb  3.5  /  TBili  0.5  /  DBili  x   /  AST  74<H>  /  ALT  371<H>  /  AlkPhos  103  10-15    LIVER FUNCTIONS - ( 15 Oct 2019 04:19 )  Alb: 3.5 g/dL / Pro: 7.4 g/dL / ALK PHOS: 103 U/L / ALT: 371 U/L / AST: 74 U/L / GGT: x               CARDIAC MARKERS ( 14 Oct 2019 05:51 )  x     / x     / 22 U/L / x     / 3.1 ng/mL  CARDIAC MARKERS ( 13 Oct 2019 23:46 )  x     / x     / 22 U/L / x     / 2.8 ng/mL  CARDIAC MARKERS ( 13 Oct 2019 16:48 )  x     / x     / 27 U/L / x     / 2.8 ng/mL          TELEMETRY:     EKG:     IMAGING: no new imaging PATIENT:  SELVIN CLAIRE  [id number]    CHIEF COMPLAINT:  Patient is a 73y old  Female who presents with a chief complaint of Acute decompensated heart failure, SOB (14 Oct 2019 22:24)      INTERVAL HISTORY/OVERNIGHT EVENTS: No acute events overnight. Pt remains off vasopressin, tolerated metoprolol 12.5 mg BID. 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 Pt seen and examined at bedside. Denies chest pain or shortness of breath.        MEDICATIONS:  MEDICATIONS  (STANDING):  aspirin enteric coated 81 milliGRAM(s) Oral daily  atorvastatin 80 milliGRAM(s) Oral at bedtime  cefepime   IVPB 1000 milliGRAM(s) IV Intermittent every 24 hours  chlorhexidine 2% Cloths 1 Application(s) Topical <User Schedule>  clopidogrel Tablet 75 milliGRAM(s) Oral daily  dextrose 5%. 1000 milliLiter(s) (50 mL/Hr) IV Continuous <Continuous>  dextrose 50% Injectable 12.5 Gram(s) IV Push once  dextrose 50% Injectable 25 Gram(s) IV Push once  dextrose 50% Injectable 25 Gram(s) IV Push once  docusate sodium 100 milliGRAM(s) Oral two times a day  heparin  Injectable 5000 Unit(s) SubCutaneous every 12 hours  insulin glargine Injectable (LANTUS) 60 Unit(s) SubCutaneous at bedtime  insulin lispro (HumaLOG) corrective regimen sliding scale   SubCutaneous three times a day before meals  insulin lispro (HumaLOG) corrective regimen sliding scale   SubCutaneous at bedtime  insulin lispro Injectable (HumaLOG) 18 Unit(s) SubCutaneous three times a day before meals  levothyroxine 50 MICROGram(s) Oral daily  melatonin 5 milliGRAM(s) Oral at bedtime  metoprolol tartrate 12.5 milliGRAM(s) Oral two times a day  midodrine 10 milliGRAM(s) Oral every 8 hours  pantoprazole    Tablet 40 milliGRAM(s) Oral before breakfast  polyethylene glycol 3350 17 Gram(s) Oral daily  senna 2 Tablet(s) Oral at bedtime    MEDICATIONS  (PRN):  ALPRAZolam 0.25 milliGRAM(s) Oral three times a day PRN Anxiety  dextrose 40% Gel 15 Gram(s) Oral once PRN Blood Glucose LESS THAN 70 milliGRAM(s)/deciliter  glucagon  Injectable 1 milliGRAM(s) IntraMuscular once PRN Glucose LESS THAN 70 milligrams/deciliter      ALLERGIES:  Allergies    azithromycin (Hives; Pruritus)    Intolerances        OBJECTIVE:  ICU Vital Signs Last 24 Hrs  T(C): 36.7 (15 Oct 2019 05:30), Max: 37 (14 Oct 2019 16:00)  T(F): 98.1 (15 Oct 2019 05:30), Max: 98.6 (14 Oct 2019 16:00)  HR: 72 (15 Oct 2019 06:00) (62 - 84)  BP: 95/57 (14 Oct 2019 08:00) (95/57 - 95/57)  BP(mean): 74 (14 Oct 2019 08:00) (74 - 74)  ABP: 120/58 (15 Oct 2019 06:00) (90/44 - 130/56)  ABP(mean): 82 (15 Oct 2019 06:00) (54 - 100)  RR: 21 (15 Oct 2019 06:00) (18 - 31)  SpO2: 100% (15 Oct 2019 06:00) (94% - 100%)      Adult Advanced Hemodynamics Last 24 Hrs  CVP(mm Hg): --  CVP(cm H2O): --  CO: --  CI: --  PA: --  PA(mean): --  PCWP: --  SVR: --  SVRI: --  PVR: --  PVRI: --  CAPILLARY BLOOD GLUCOSE      POCT Blood Glucose.: 381 mg/dL (14 Oct 2019 21:59)  POCT Blood Glucose.: 387 mg/dL (14 Oct 2019 17:06)  POCT Blood Glucose.: 418 mg/dL (14 Oct 2019 12:17)  POCT Blood Glucose.: 428 mg/dL (14 Oct 2019 11:01)  POCT Blood Glucose.: 414 mg/dL (14 Oct 2019 10:59)  POCT Blood Glucose.: 383 mg/dL (14 Oct 2019 08:06)    CAPILLARY BLOOD GLUCOSE      POCT Blood Glucose.: 381 mg/dL (14 Oct 2019 21:59)    I&O's Summary    14 Oct 2019 07:01  -  15 Oct 2019 07:00  --------------------------------------------------------  IN: 1253.6 mL / OUT: 1025 mL / NET: 228.6 mL      Daily     Daily Weight in k.1 (15 Oct 2019 05:30)      PHYSICAL EXAMINATION:  General: elderly woman lying in bed, appears comfortable  HEENT: PERRLA, EOMI, moist mucous membranes  Respiratory: clear bilaterally   CV: RRR, S1S2, no murmurs, rubs or gallops  Abdominal: Soft, nontender, nondistended  Extremities: no edema, + peripheral pulses      LABS:                          9.3    12.41 )-----------( 230      ( 15 Oct 2019 04:19 )             29.6     10-15    141  |  105  |  89<H>  ----------------------------<  240<H>  3.6   |  19<L>  |  2.09<H>    Ca    9.3      15 Oct 2019 04:19  Phos  3.3     10-15  Mg     2.6     10-15    TPro  7.4  /  Alb  3.5  /  TBili  0.5  /  DBili  x   /  AST  74<H>  /  ALT  371<H>  /  AlkPhos  103  10-15    LIVER FUNCTIONS - ( 15 Oct 2019 04:19 )  Alb: 3.5 g/dL / Pro: 7.4 g/dL / ALK PHOS: 103 U/L / ALT: 371 U/L / AST: 74 U/L / GGT: x               CARDIAC MARKERS ( 14 Oct 2019 05:51 )  x     / x     / 22 U/L / x     / 3.1 ng/mL  CARDIAC MARKERS ( 13 Oct 2019 23:46 )  x     / x     / 22 U/L / x     / 2.8 ng/mL  CARDIAC MARKERS ( 13 Oct 2019 16:48 )  x     / x     / 27 U/L / x     / 2.8 ng/mL        IMAGING: no new imaging PATIENT:  SELVIN CLAIRE  [id number]    CHIEF COMPLAINT:  Patient is a 73y old  Female who presents with a chief complaint of Acute decompensated heart failure, SOB (14 Oct 2019 22:24)      INTERVAL HISTORY/OVERNIGHT EVENTS: No acute events overnight. Pt remains off vasopressin, tolerated metoprolol 12.5 mg BID. 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 Pt seen and examined at bedside. Denies chest pain or shortness of breath.        MEDICATIONS:  MEDICATIONS  (STANDING):  aspirin enteric coated 81 milliGRAM(s) Oral daily  atorvastatin 80 milliGRAM(s) Oral at bedtime  cefepime   IVPB 1000 milliGRAM(s) IV Intermittent every 24 hours  chlorhexidine 2% Cloths 1 Application(s) Topical <User Schedule>  clopidogrel Tablet 75 milliGRAM(s) Oral daily  dextrose 5%. 1000 milliLiter(s) (50 mL/Hr) IV Continuous <Continuous>  dextrose 50% Injectable 12.5 Gram(s) IV Push once  dextrose 50% Injectable 25 Gram(s) IV Push once  dextrose 50% Injectable 25 Gram(s) IV Push once  docusate sodium 100 milliGRAM(s) Oral two times a day  heparin  Injectable 5000 Unit(s) SubCutaneous every 12 hours  insulin glargine Injectable (LANTUS) 60 Unit(s) SubCutaneous at bedtime  insulin lispro (HumaLOG) corrective regimen sliding scale   SubCutaneous three times a day before meals  insulin lispro (HumaLOG) corrective regimen sliding scale   SubCutaneous at bedtime  insulin lispro Injectable (HumaLOG) 18 Unit(s) SubCutaneous three times a day before meals  levothyroxine 50 MICROGram(s) Oral daily  melatonin 5 milliGRAM(s) Oral at bedtime  metoprolol tartrate 12.5 milliGRAM(s) Oral two times a day  midodrine 10 milliGRAM(s) Oral every 8 hours  pantoprazole    Tablet 40 milliGRAM(s) Oral before breakfast  polyethylene glycol 3350 17 Gram(s) Oral daily  senna 2 Tablet(s) Oral at bedtime    MEDICATIONS  (PRN):  ALPRAZolam 0.25 milliGRAM(s) Oral three times a day PRN Anxiety  dextrose 40% Gel 15 Gram(s) Oral once PRN Blood Glucose LESS THAN 70 milliGRAM(s)/deciliter  glucagon  Injectable 1 milliGRAM(s) IntraMuscular once PRN Glucose LESS THAN 70 milligrams/deciliter      ALLERGIES:  Allergies    azithromycin (Hives; Pruritus)    Intolerances        OBJECTIVE:  ICU Vital Signs Last 24 Hrs  T(C): 36.7 (15 Oct 2019 05:30), Max: 37 (14 Oct 2019 16:00)  T(F): 98.1 (15 Oct 2019 05:30), Max: 98.6 (14 Oct 2019 16:00)  HR: 72 (15 Oct 2019 06:00) (62 - 84)  BP: 95/57 (14 Oct 2019 08:00) (95/57 - 95/57)  BP(mean): 74 (14 Oct 2019 08:00) (74 - 74)  ABP: 120/58 (15 Oct 2019 06:00) (90/44 - 130/56)  ABP(mean): 82 (15 Oct 2019 06:00) (54 - 100)  RR: 21 (15 Oct 2019 06:00) (18 - 31)  SpO2: 100% (15 Oct 2019 06:00) (94% - 100%)      POCT Blood Glucose.: 381 mg/dL (14 Oct 2019 21:59)  POCT Blood Glucose.: 387 mg/dL (14 Oct 2019 17:06)  POCT Blood Glucose.: 418 mg/dL (14 Oct 2019 12:17)  POCT Blood Glucose.: 428 mg/dL (14 Oct 2019 11:01)  POCT Blood Glucose.: 414 mg/dL (14 Oct 2019 10:59)  POCT Blood Glucose.: 383 mg/dL (14 Oct 2019 08:06)    CAPILLARY BLOOD GLUCOSE      POCT Blood Glucose.: 381 mg/dL (14 Oct 2019 21:59)    I&O's Summary    14 Oct 2019 07:01  -  15 Oct 2019 07:00  --------------------------------------------------------  IN: 1253.6 mL / OUT: 1025 mL / NET: 228.6 mL      Daily     Daily Weight in k.1 (15 Oct 2019 05:30)      PHYSICAL EXAMINATION:  General: elderly woman lying in bed, appears comfortable  HEENT: PERRLA, EOMI, moist mucous membranes  Respiratory: clear bilaterally   CV: RRR, S1S2, no murmurs, rubs or gallops  Abdominal: Soft, nontender, nondistended  Extremities: no edema, + peripheral pulses      LABS:                          9.3    12.41 )-----------( 230      ( 15 Oct 2019 04:19 )             29.6     10-15    141  |  105  |  89<H>  ----------------------------<  240<H>  3.6   |  19<L>  |  2.09<H>    Ca    9.3      15 Oct 2019 04:19  Phos  3.3     10-15  Mg     2.6     10-15    TPro  7.4  /  Alb  3.5  /  TBili  0.5  /  DBili  x   /  AST  74<H>  /  ALT  371<H>  /  AlkPhos  103  10-15    LIVER FUNCTIONS - ( 15 Oct 2019 04:19 )  Alb: 3.5 g/dL / Pro: 7.4 g/dL / ALK PHOS: 103 U/L / ALT: 371 U/L / AST: 74 U/L / GGT: x               CARDIAC MARKERS ( 14 Oct 2019 05:51 )  x     / x     / 22 U/L / x     / 3.1 ng/mL  CARDIAC MARKERS ( 13 Oct 2019 23:46 )  x     / x     / 22 U/L / x     / 2.8 ng/mL  CARDIAC MARKERS ( 13 Oct 2019 16:48 )  x     / x     / 27 U/L / x     / 2.8 ng/mL        IMAGING: no new imaging

## 2019-10-15 NOTE — DISCHARGE NOTE PROVIDER - HOSPITAL COURSE
Admission and CCU course:    72yo F with HTN, HLD, DM2 (A1c 7.5 10/19), CAD s/p CABG (LIMA to LAD, SVG to OM, SVG to PDA) and prior PCI, severe mitral regurgitation s/p mitral clip (6/19), severe pulmonary HTN, CKD IV (b/l Cr 2-2.2), hypothyroidism presented with two days of SOB. In the ED was given asa 162 mg, zosyn x1, vanc 1 g. VS in ED 98 F, BP 86/54, HR 54, RR 48, placed on Bipap 10/5. Pt admitted to CCU for acute decompensated heart failure. She was diuresed and successfully weaned off bipap to room air. Trops were elevated to 1800, pt was started on heparin gtt for NSTEMI. Pt underwent RHC and placement of [place] and intra-aortic balloon pump. RHC showed elevated filling pressures and borderline low CI (2.3) with /80s s/p IABP. Was diuresed with improvement in filling pressures but has persistent renal dysfunction. LHC deferred in light of MERVIN on CKD, creatinine in 3 range from baseline of 2. Heart Failure team was consulted. Advised to wean off IABP. On 10/10 pt had two episodes of SVT which resolved s/p 6 mg adenosine IVP. Metoprolol 12.5 mg BID started with good response- no further episodes of SVT. Weaned off of intra-aortic balloon pump. Due to leukocytosis empiric vanc/zosyn were given, switched to cefepime to be completed on 10/14 for 7 day course, cultures negative to date. Became hypotensive to 70s/40s after metoprolol dose was increased to 25 mg, required vasopressin. Lactate was stable and low suspicion for cardiogenic shock. s/p 1 unit prbcs with good response- Hgbs remained in 9 range. 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 Pt was weaned off vaso to midodrine 10 mg TID. She tolerated metoprolol 12.5 mg and was stable for transfer to floors. 74yo F with HTN, HLD, DM2 (A1c 7.5 10/19), CAD s/p CABG (LIMA to LAD, SVG to OM, SVG to PDA) and prior PCI, severe mitral regurgitation s/p mitral clip (6/19), severe pulmonary HTN, CKD IV (b/l Cr 2-2.2), hypothyroidism presented with two days of SOB. In the ED was given asa 162 mg, zosyn x1, vanc 1 g. VS in ED 98 F, BP 86/54, HR 54, RR 48, placed on Bipap 10/5. Pt admitted to CCU for acute decompensated heart failure. She was diuresed and successfully weaned off bipap to room air. Trops were elevated to 1800, pt was started on heparin gtt for NSTEMI. Pt underwent RHC and placement of [place] and intra-aortic balloon pump. RHC showed elevated filling pressures and borderline low CI (2.3) with /80s s/p IABP. Was diuresed with improvement in filling pressures but has persistent renal dysfunction. LHC deferred in light of MERVIN on CKD, creatinine in 3 range from baseline of 2. Heart Failure team was consulted. Advised to wean off IABP. On 10/10 pt had two episodes of SVT which resolved s/p 6 mg adenosine IVP. Metoprolol 12.5 mg BID started with good response- no further episodes of SVT. Weaned off of intra-aortic balloon pump. Due to leukocytosis empiric vanc/zosyn were given, switched to cefepime to be completed on 10/14 for 7 day course, cultures negative to date. Became hypotensive to 70s/40s after metoprolol dose was increased to 25 mg, required vasopressin. Lactate was stable and low suspicion for cardiogenic shock. s/p 1 unit prbcs with good response- Hgbs remained in 9 range. 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 Pt was weaned off vaso to midodrine 10 mg TID. She tolerated metoprolol 12.5 mg and was stable for transfer to floors.

## 2019-10-15 NOTE — DISCHARGE NOTE PROVIDER - CARE PROVIDER_API CALL
Obi Wang  Phone: [phone]  Fax: (   )    -  Follow Up Time: Obi Wang  Phone: [phone]  Fax: (   )    -  Follow Up Time:     Vargas Adame)  Cardiovascular Disease  [facility] [address]  Phone: [phone]  Fax: [phone]  Follow Up Time:

## 2019-10-15 NOTE — PROGRESS NOTE ADULT - SUBJECTIVE AND OBJECTIVE BOX
CHIEF COMPLAINT:  patient was seen today in the CCU sitting in the bed awake with verbal but seems to be depressed without cough or chest pain or other symptom  patient is 73-year-old female who was admitted secondary to shortness of breath2/2 acute decompensated heart failure  SUBJECTIVE:     REVIEW OF SYSTEMS:    CONSTITUTIONAL: ( x )  weakness,  (  ) fevers or chills  EYES/ENT: (  )visual changes;     NECK: (  ) pain or stiffness  RESPIRATORY:   (  )cough, wheezing, hemoptysis;  (  ) shortness of breath  CARDIOVASCULAR:  (  )chest pain or palpitations  GASTROINTESTINAL:   (  )abdominal or epigastric pain.  (  ) nausea, vomiting, or hematemesis;   (   ) diarrhea or constipation.   GENITOURINARY:   (    ) dysuria, frequency or hematuria  NEUROLOGICAL:  (   ) numbness or weakness   All other review of systems is negative unless indicated above    Vital Signs Last 24 Hrs  T(C): 36.5 (15 Oct 2019 21:27), Max: 37.2 (15 Oct 2019 17:48)  T(F): 97.7 (15 Oct 2019 21:27), Max: 98.9 (15 Oct 2019 17:48)  HR: 73 (15 Oct 2019 21:27) (62 - 78)  BP: 112/71 (15 Oct 2019 21:27) (112/71 - 113/74)  BP(mean): --  RR: 20 (15 Oct 2019 21:27) (11 - 37)  SpO2: 100% (15 Oct 2019 21:27) (95% - 100%)    I&O's Summary    14 Oct 2019 07:01  -  15 Oct 2019 07:00  --------------------------------------------------------  IN: 1253.6 mL / OUT: 1025 mL / NET: 228.6 mL    15 Oct 2019 07:01  -  15 Oct 2019 22:42  --------------------------------------------------------  IN: 400 mL / OUT: 900 mL / NET: -500 mL        CAPILLARY BLOOD GLUCOSE      POCT Blood Glucose.: 354 mg/dL (15 Oct 2019 22:11)  POCT Blood Glucose.: 174 mg/dL (15 Oct 2019 19:02)  POCT Blood Glucose.: 82 mg/dL (15 Oct 2019 18:30)  POCT Blood Glucose.: 93 mg/dL (15 Oct 2019 18:10)  POCT Blood Glucose.: 64 mg/dL (15 Oct 2019 17:56)  POCT Blood Glucose.: 64 mg/dL (15 Oct 2019 17:55)  POCT Blood Glucose.: 307 mg/dL (15 Oct 2019 11:46)  POCT Blood Glucose.: 158 mg/dL (15 Oct 2019 08:24)      PHYSICAL EXAM:      Constitutional:  ( x  ) NAD,   ( x  )awake and alert ill  appearing woman  HEENT: PERR, EOMI,    Neck: Soft and supple, No LAD, No JVD  Respiratory:  (  x  Breath sounds are clear bilaterally,    (   ) wheezing, rales or rhonchi  Cardiovascular:     (x   )S1 and S2, regular rate and rhythm, no Murmurs, gallops or rubs  Gastrointestinal:  ( x  )Bowel Sounds present, soft,   (  )nontender, nondistended,    Extremities:    (  ) peripheral edema  Vascular: 2+ peripheral pulses  Neurological:    ( x   )A/O x 3,   (  ) focal deficits  Musculoskeletal:    (   )  normal strength b/l upper  (     ) normal  lower extremities  Skin: No rashes     MEDICATIONS:  MEDICATIONS  (STANDING):  aspirin enteric coated 81 milliGRAM(s) Oral daily  atorvastatin 80 milliGRAM(s) Oral at bedtime  chlorhexidine 2% Cloths 1 Application(s) Topical <User Schedule>  clopidogrel Tablet 75 milliGRAM(s) Oral daily  dextrose 5%. 1000 milliLiter(s) (50 mL/Hr) IV Continuous <Continuous>  dextrose 50% Injectable 12.5 Gram(s) IV Push once  dextrose 50% Injectable 25 Gram(s) IV Push once  dextrose 50% Injectable 25 Gram(s) IV Push once  docusate sodium 100 milliGRAM(s) Oral two times a day  heparin  Injectable 5000 Unit(s) SubCutaneous every 12 hours  insulin glargine Injectable (LANTUS) 50 Unit(s) SubCutaneous at bedtime  insulin lispro (HumaLOG) corrective regimen sliding scale   SubCutaneous three times a day before meals  insulin lispro (HumaLOG) corrective regimen sliding scale   SubCutaneous at bedtime  levothyroxine 50 MICROGram(s) Oral daily  melatonin 5 milliGRAM(s) Oral at bedtime  metoprolol tartrate 12.5 milliGRAM(s) Oral two times a day  midodrine 10 milliGRAM(s) Oral every 8 hours  pantoprazole    Tablet 40 milliGRAM(s) Oral before breakfast  polyethylene glycol 3350 17 Gram(s) Oral daily  senna 2 Tablet(s) Oral at bedtime      LABS: All Labs Reviewed:                        9.3    12.41 )-----------( 230      ( 15 Oct 2019 04:19 )             29.6     10-15    141  |  105  |  89<H>  ----------------------------<  240<H>  3.6   |  19<L>  |  2.09<H>    Ca    9.3      15 Oct 2019 04:19  Phos  3.3     10-15  Mg     2.6     10-15    TPro  7.1  /  Alb  3.4<L>  /  TBili  x   /  DBili  x   /  AST  x   /  ALT  x   /  AlkPhos  x   10-15      CARDIAC MARKERS ( 14 Oct 2019 05:51 )  x     / x     / 22 U/L / x     / 3.1 ng/mL  CARDIAC MARKERS ( 13 Oct 2019 23:46 )  x     / x     / 22 U/L / x     / 2.8 ng/mL      Blood Culture:   Urine Culture      RADIOLOGY/EKG:    ASSESSMENT AND PLAN:  72 y/o F w/ PMH of HTN, HLD, DM2, GERD, CAD s/p CABG (LIMA to LAD, SVG to OM, SVG to PDA; 2014), severe MR s/p MitraClip, severe pHTN, CKD4, hypoTH c/o dyspnea x 2 days suspect 2/2  NSTEMI +/- ADHF w/ e/o organ hypoperfusion.    #cardiovascular  NSTEMI, acute pulmonary edema, elevated lactic acid levels.  Elevated biomarkers and transaminitis.  Paroxysmal SVT noted.  RHC and IABP insertion. IIABP weaned yesterday. Patient now hypotensive requiring pressor support.       #Neuro - No active issues  #Resp - Off supplemental O2, sating well on RA. Vol optimize, as above.  #GI - Heart healthy diet. Trend liver tests, downtrending   #Endo -  Her blood sugar elevated today  off   IV insulin 4 cc/h     currently she is on  insulin glargine Injectable (LANTUS)  50  Unit(s) SubCutaneous at bedtime  insulin lispro (HumaLOG) corrective regimen sliding scale   SubCutaneous three times a day before meals  insulin lispro (HumaLOG) corrective regimen sliding scale   SubCutaneous at bedtime  insulin lispro Injectable (HumaLOG) 18 Unit(s) SubCutaneous three times a day before meals       #Renal - Vol optimize, as above. MERVIN likely 2/2 acute congestion. continue to monitor creatinine.    #ID - Leukocytosis. Sepsis w/u unremarkable thus far- BCx and UCx NGTD; c/w Cefepime given MERVIN, to complete 7 day course on 10/14  #Hem - HSQ for DVT PPX. iron studies. will transfuse 1 unit PRBCS     DVT PPX:    ADVANCED DIRECTIVE:    DISPOSITION:

## 2019-10-15 NOTE — PROGRESS NOTE ADULT - ASSESSMENT
Patient is a 74 yo woman with a PMHx of HTN, HLD, T2DM, GERD, CAD s/p CABG (LIMA to LAD, SVG to OM, SVG to PDA 2014 at [facility]), HFrEF (25-30%), severe mitral regurgitation s/p mitral clip, severe pulmonary HTN, CKD IV, hypothyroidism presenting on 10/8 with 2 day history of SOB. Admitted to CCU for ADHF/NSTEMI management. EP consulted for pSVT    #AVNRT  -Continue metoprolol tartrate 12.5 mg BID  -Continue telemetry monitoring  -Monitor and replete lytes PRN  -Can give adenosine or try vagal maneuvers if SVT recurs      Jose Sampson MD  Cardiology Fellow  Call or Text: [phone]  All cardiology service information can be found 24/7 on [email or web address], password: [email or web address]

## 2019-10-15 NOTE — PROGRESS NOTE ADULT - ASSESSMENT
Assessment  DMT2: 73y Female with DM T2 with hyperglycemia, A1C 7.5%, blood sugars  fluctuating, on large dose basal bolus insulin, had hypoglycemic episode, insulin dose adjusted, complains of weakness, eating meals.  SOB: on meds, FU Heart Failure team.  CAD: S/P CABG previous admission, on medications, no chest pain, stable, monitored.  Hypothyroidism: On Synthroid 50 mcg po daily, compliant with Synthroid intake, asymptomatic.  HTN: Controlled,  on antihypertensive medications.  CKD: Monitor labs/BMP,           Jillian Banks MD  Cell: [phone]  Office: [phone]

## 2019-10-15 NOTE — PHYSICAL THERAPY INITIAL EVALUATION ADULT - GAIT DEVIATIONS NOTED, PT EVAL
decreased sussy/decreased velocity of limb motion/increased time in double stance/decreased weight-shifting ability

## 2019-10-15 NOTE — DISCHARGE NOTE PROVIDER - NSDCCPCAREPLAN_GEN_ALL_CORE_FT
PRINCIPAL DISCHARGE DIAGNOSIS  Diagnosis: Acute on chronic systolic heart failure  Assessment and Plan of Treatment: Acute on chronic systolic heart failure      SECONDARY DISCHARGE DIAGNOSES  Diagnosis: MERVIN (acute kidney injury)  Assessment and Plan of Treatment: MERVIN (acute kidney injury) PRINCIPAL DISCHARGE DIAGNOSIS  Diagnosis: Acute on chronic systolic heart failure  Assessment and Plan of Treatment: Acute on chronic systolic heart failure  Follow up with Dr Adame  you currently have apointment scheduled for 11/8 at 1115  call for earlier apointment on monday      SECONDARY DISCHARGE DIAGNOSES  Diagnosis: MERVIN (acute kidney injury)  Assessment and Plan of Treatment: MERVIN (acute kidney injury) PRINCIPAL DISCHARGE DIAGNOSIS  Diagnosis: Acute on chronic systolic heart failure  Assessment and Plan of Treatment: Acute on chronic systolic heart failure  Follow up with Dr Adame service  you currently have apointment scheduled for 11/8 at 1115 with Dr Winters        SECONDARY DISCHARGE DIAGNOSES  Diagnosis: MERVIN (acute kidney injury)  Assessment and Plan of Treatment: Improved    Diagnosis: Type 2 diabetes mellitus without complication, with long-term current use of insulin  Assessment and Plan of Treatment: follow up with your endocronologist within two weeks   Make sure you get your HgA1c checked every three months.  If you take oral diabetes medications, check your blood glucose two times a day.  If you take insulin, check your blood glucose before meals and at bedtime.  It's important not to skip any meals.  Keep a log of your blood glucose results and always take it with you to your doctor appointments.  Keep a list of your current medications including injectables and over the counter medications and bring this medication list with you to all your doctor appointments.  If you have not seen your opthalmologist this year call for appointment.  Check your feet daily for redness, sores, or openings. Do not self treat. If no improvement in two days call your primary care physician for an appointment.  Low blood sugar (hypoglycemia) is a blood sugar below 70mg/dl. Check your blood sugar if you feel signs/symptoms of hypoglycemia. If your blood sugar is below 70 take 15 grams of carbohydrates (ex 4 oz of apple juice, 3-4 glucosr tablets, or 4-6 oz of regular soda) wait 15 minutes and repeat blood sugar to make sure it comes up above 70.  If your blood sugar is above 70 and you are due for a meal, have a meal.  If you are not due for a meal have a snack.  This snack helps keeps your blood sugar at a safe range.

## 2019-10-15 NOTE — PROGRESS NOTE ADULT - SUBJECTIVE AND OBJECTIVE BOX
Patient seen and examined at bedside.    Overnight Events: No acute events overnight. Started on metoprolol 12.5 BID, No further episodes of AVNRT.    Review Of Systems: No chest pain, shortness of breath, or palpitations            Medications:  ALPRAZolam 0.25 milliGRAM(s) Oral three times a day PRN  aspirin enteric coated 81 milliGRAM(s) Oral daily  atorvastatin 80 milliGRAM(s) Oral at bedtime  buMETAnide 2 milliGRAM(s) Oral once  chlorhexidine 2% Cloths 1 Application(s) Topical <User Schedule>  clopidogrel Tablet 75 milliGRAM(s) Oral daily  dextrose 40% Gel 15 Gram(s) Oral once PRN  dextrose 5%. 1000 milliLiter(s) IV Continuous <Continuous>  dextrose 50% Injectable 12.5 Gram(s) IV Push once  dextrose 50% Injectable 25 Gram(s) IV Push once  dextrose 50% Injectable 25 Gram(s) IV Push once  docusate sodium 100 milliGRAM(s) Oral two times a day  glucagon  Injectable 1 milliGRAM(s) IntraMuscular once PRN  heparin  Injectable 5000 Unit(s) SubCutaneous every 12 hours  insulin glargine Injectable (LANTUS) 60 Unit(s) SubCutaneous at bedtime  insulin lispro (HumaLOG) corrective regimen sliding scale   SubCutaneous three times a day before meals  insulin lispro (HumaLOG) corrective regimen sliding scale   SubCutaneous at bedtime  insulin lispro Injectable (HumaLOG) 18 Unit(s) SubCutaneous three times a day before meals  levothyroxine 50 MICROGram(s) Oral daily  melatonin 5 milliGRAM(s) Oral at bedtime  metoprolol tartrate 12.5 milliGRAM(s) Oral two times a day  midodrine 10 milliGRAM(s) Oral every 8 hours  pantoprazole    Tablet 40 milliGRAM(s) Oral before breakfast  polyethylene glycol 3350 17 Gram(s) Oral daily  senna 2 Tablet(s) Oral at bedtime      PAST MEDICAL & SURGICAL HISTORY:  GERD (gastroesophageal reflux disease)  CAD (coronary artery disease): s/p CABG  Hypothyroidism  Hyperlipidemia  Diabetes mellitus, type 2  S/P CABG (coronary artery bypass graft)      Vitals:  T(F): 98.1 (10-15), Max: 98.6 (10-14)  HR: 68 (10-15) (62 - 84)  BP: --  RR: 20 (10-15)  SpO2: 95% (10-15)  I&O's Summary    14 Oct 2019 07:01  -  15 Oct 2019 07:00  --------------------------------------------------------  IN: 1253.6 mL / OUT: 1025 mL / NET: 228.6 mL    15 Oct 2019 07:01  -  15 Oct 2019 10:01  --------------------------------------------------------  IN: 0 mL / OUT: 300 mL / NET: -300 mL        Physical Exam:  General: elderly woman, NAD   HEENT: PERRLA, EOMI, moist mucous membranes  Respiratory: clear bilaterally   CV: RRR, S1S2, no murmurs, rubs or gallops  Abdominal: Soft, nontender, nondistended  Extremities: no edema, + peripheral pulses                            9.3    12.41 )-----------( 230      ( 15 Oct 2019 04:19 )             29.6     10-15    141  |  105  |  89<H>  ----------------------------<  240<H>  3.6   |  19<L>  |  2.09<H>    Ca    9.3      15 Oct 2019 04:19  Phos  3.3     10-15  Mg     2.6     10-15    TPro  7.4  /  Alb  3.5  /  TBili  0.5  /  DBili  x   /  AST  74<H>  /  ALT  371<H>  /  AlkPhos  103  10-15      CARDIAC MARKERS ( 14 Oct 2019 05:51 )  x     / x     / 22 U/L / x     / 3.1 ng/mL  CARDIAC MARKERS ( 13 Oct 2019 23:46 )  x     / x     / 22 U/L / x     / 2.8 ng/mL  CARDIAC MARKERS ( 13 Oct 2019 16:48 )  x     / x     / 27 U/L / x     / 2.8 ng/mL    Echo:  TTE 10/13:  Conclusions:  1. S/P MitraClip. Mild mitral regurgitation.  Mean  transmitral valve gradient equals 4 mm Hg. (HR 62)  2. Despite intravenous injection of Ultrasonic Enhancing  Agent (Definity) endocardial visualization is limited.  Grossly severe left ventricular systolic dysfunction.  3. Normal right ventricular size with decreased right  ventricular systolic function.  4. Normal tricuspid valve. Mild-moderate tricuspid  regurgitation.  5. Estimated pulmonary artery systolic pressure equals 49  mm Hg, assuming right atrial pressure equals 8 mm Hg,  consistent with mild pulmonary pressures.  ------------------------------------------------------------------------  Confirmed on  10/13/2019 - 15:35:24 by Bushra Werner M.D.  ------------------------------------------------------------------------  Interpretation of Telemetry: Sinus 60-70s

## 2019-10-15 NOTE — PROGRESS NOTE ADULT - ASSESSMENT
74 y/o F w/ PMH of HTN, HLD, DM2, GERD, CAD s/p CABG (LIMA to LAD, SVG to OM, SVG to PDA; 2014), severe MR s/p MitraClip, severe pHTN, CKD4, hypoTH c/o dyspnea x 2 days suspect 2/2  NSTEMI +/- ADHF w/ e/o organ hypoperfusion.    #CV  Vessels - Elev troponin, possible NSTEMI. trops downtrending S/P hep gtt.  S/p IABP to improve coronary perfusion. per HF team not a candidate for LHC in light of poor renal function, will cw medical mgmt. C/w ASA/clopidogrel, statin  Pump - Severe reduced LVEF. s/p Bumetanide gtt. hypotension requiring vasopressin since 10/12--weaning today to midodrine 10 mg Q8h. on exam perfusing well, lactate improved. low concern for cardiogenic shock.   low concern for sepsis as cultures negative and pt on IV abx. AM cortisol normal. s/p 1 unit PRBCS with good response.   Valves - S/p MitraClip. No active issues.  Rhythm - on tele. s/p SVT with aberrancy last week which responded to adenosine, o/n 10/13 with SVT but was given metoprolol --> if recurs, assess BP --> adenosine vs beta blocker. will consult EP.    #Neuro - No active issues  #Resp - Off supplemental O2, sating well on RA. Vol optimize, as above.  #GI - Heart healthy diet. Trend liver tests, downtrending   #Endo - Trend BGFS on Lantus and Humalog. endo recs appreciated - high blg glc > 400 today 10/13 --> additional insulin given and endocrine aware. increasing Lantus today.   C/w home med Synthroid.   #Renal - Vol optimize, as above. MERVIN likely 2/2 prerenal from heart strain. continue to monitor creatinine - downtrending  #ID - Leukocytosis resolving. Sepsis w/u unremarkable thus far- BCx and UCx NGTD; c/w Cefepime given MERVIN, to complete 7 day course on 10/14.  #Hem - HSQ for DVT PPX. Anemia- iron studies showing normal ferritin/iron binding capacity- ?CHRISTOPHE. normal hemolysis labs. s/p 1 unit PRBCS for Hb < 8 --> responded appropriately to > 9  #Ethics - FC. 74 y/o F w/ PMH of HTN, HLD, DM2, GERD, CAD s/p CABG (LIMA to LAD, SVG to OM, SVG to PDA; 2014), severe MR s/p MitraClip, severe pHTN, CKD4, hypoTH c/o dyspnea x 2 days suspect 2/2  NSTEMI +/- ADHF w/ e/o organ hypoperfusion.    #CV  Vessels - Elev troponin, possible NSTEMI. trops downtrending S/P hep gtt.  S/p IABP to improve coronary perfusion. per HF team not a candidate for LHC in light of poor renal function, will cw medical mgmt. C/w ASA/clopidogrel, statin  Pump - Severe reduced LVEF. s/p Bumetanide gtt. hypotension requiring vasopressin 10/12--weaned 10/14, now on midodrine 10 mg Q8h. on exam perfusing well, lactate improved. low concern for cardiogenic shock. low concern for sepsis as cultures negative and pt on IV abx. AM cortisol normal. s/p 1 unit PRBCS with good response.   Valves - S/p MitraClip. No active issues.  Rhythm - AVNRT. episode last week responded to adenosine, again on 10/13 given metoprolol. EP consulted. started on metoprolol tartrate 12.5 mg BID- BP appears to tolerate, pt remains off pressors. Can give adenosine or try vagal maneuvers if SVT recurs.      #Neuro - No active issues  #Resp - Off supplemental O2, sating well on RA. Vol optimize, as above.  #GI - Heart healthy diet. Trend liver tests, downtrending   #Endo - Trend BGFS on Lantus and Humalog. endo recs appreciated - high blg glc > 400 today 10/13 --> additional insulin given and endocrine aware. increasing Lantus today.   C/w home med Synthroid.   #Renal - Vol optimize, as above. MERVIN likely 2/2 prerenal from heart strain. continue to monitor creatinine - downtrending  #ID - Leukocytosis resolving. Sepsis w/u unremarkable thus far- BCx and UCx NGTD; c/w Cefepime given MERVIN, to complete 7 day course on 10/14.  #Hem - HSQ for DVT PPX. Anemia- iron studies showing normal ferritin/iron binding capacity- ?CHRISTOPHE. normal hemolysis labs. s/p 1 unit PRBCS for Hb < 8 --> responded appropriately to > 9  #Ethics - FC. 72 y/o F w/ PMH of HTN, HLD, DM2, GERD, CAD s/p CABG (LIMA to LAD, SVG to OM, SVG to PDA; 2014), severe MR s/p MitraClip, severe pHTN, CKD4, hypoTH c/o dyspnea x 2 days suspect 2/2  NSTEMI +/- ADHF w/ e/o organ hypoperfusion.    #CV  Vessels - Elev troponin, possible NSTEMI. trops downtrending S/P hep gtt.  S/p IABP to improve coronary perfusion. per HF team not a candidate for LHC in light of poor renal function, will cw medical mgmt. C/w ASA/clopidogrel, statin  Pump - Severe reduced LVEF. s/p Bumetanide gtt. hypotension requiring vasopressin 10/12--weaned 10/14, now on midodrine 10 mg Q8h. on exam perfusing well, lactate improved. low concern for cardiogenic shock. low concern for sepsis as cultures negative and pt on IV abx. AM cortisol normal. s/p 1 unit PRBCS with good response.   Valves - S/p MitraClip. No active issues.  Rhythm - AVNRT. episode last week responded to adenosine, again on 10/13 given metoprolol. EP consulted. started on metoprolol tartrate 12.5 mg BID- BP appears to tolerate, pt remains off pressors. Can give adenosine or try vagal maneuvers if SVT recurs.    #Neuro - No active issues  #Resp - Off supplemental O2, sating well on RA.   #GI - Heart healthy diet. Trend liver tests, downtrending   #Endo - Trend BGFS on Lantus and Humalog. endo recs appreciated - high blg glc > 400 today 10/13 --> additional insulin given and endocrine aware. increasing Lantus today.   C/w home med Synthroid.   #Renal - Vol optimize, as above. MERVIN likely 2/2 prerenal from heart strain. continue to monitor creatinine - downtrending.  #ID - Leukocytosis resolving. Sepsis w/u unremarkable thus far- BCx and UCx NGTD; s/p 7 day course of cefepime.  #Hem - HSQ for DVT PPX. Anemia- iron studies showing normal ferritin/iron binding capacity- ?CHRISTOPHE. normal hemolysis labs. s/p 1 unit PRBCS for Hb < 8 --> responded appropriately to > 9  #Ethics - FC. 74 y/o F w/ PMH of HTN, HLD, DM2, GERD, CAD s/p CABG (LIMA to LAD, SVG to OM, SVG to PDA; 2014), severe MR s/p MitraClip, severe pHTN, CKD4, hypoTH c/o dyspnea x 2 days suspect 2/2  NSTEMI +/- ADHF w/ e/o organ hypoperfusion.    #CV  Vessels - Elev troponin, possible NSTEMI. trops downtrending S/P hep gtt.  S/p IABP to improve coronary perfusion. per HF team not a candidate for LHC in light of poor renal function, will cw medical mgmt. C/w ASA/clopidogrel, statin  Pump - Severe reduced LVEF. s/p Bumetanide gtt. hypotension requiring vasopressin 10/12--weaned 10/14, now on midodrine 10 mg Q8h. on exam perfusing well, lactate improved. low concern for cardiogenic shock. low concern for sepsis as cultures negative and pt on IV abx. AM cortisol normal. s/p 1 unit PRBCS with good response. will diurese today with 2 mg PO Bumex.   Valves - S/p MitraClip. No active issues.  Rhythm - AVNRT. episode last week responded to adenosine, again on 10/13 given metoprolol. EP consulted. started on metoprolol tartrate 12.5 mg BID- BP appears to tolerate, pt remains off pressors. Can give adenosine or try vagal maneuvers if SVT recurs.    #Neuro - No active issues  #Resp - Off supplemental O2, sating well on RA.   #GI - Heart healthy diet. Trend liver tests, downtrending   #Endo - Trend BGFS on Lantus and Humalog. endo recs appreciated - high blg glc > 400 today 10/13 --> additional insulin given and endocrine aware. increasing Lantus today.   C/w home med Synthroid.   #Renal - Vol optimize, as above. MERVIN likely 2/2 prerenal from heart strain. continue to monitor creatinine - downtrending.  #ID - Leukocytosis resolving. Sepsis w/u unremarkable thus far- BCx and UCx NGTD; s/p 7 day course of cefepime.   #Hem - HSQ for DVT PPX. Anemia- iron studies showing normal ferritin/iron binding capacity- ?CHRISTOPHE. normal hemolysis labs. s/p 1 unit PRBCS for Hb < 8 --> responded appropriately to > 9  #Ethics - FC. 72 y/o F w/ PMH of HTN, HLD, DM2, GERD, CAD s/p CABG (LIMA to LAD, SVG to OM, SVG to PDA; 2014), severe MR s/p MitraClip, severe pHTN, CKD4, hypoTH c/o dyspnea x 2 days admitted to CCU for ADHF w/ evidence of organ hypoperfusion, also with NSTEMI currently being medically managed.     #CV  Vessels - Elev troponin, possible NSTEMI. trops downtrending S/P hep gtt.  S/p IABP to improve coronary perfusion. per HF team not a candidate for [facility] in light of poor renal function, will cw medical mgmt. C/w ASA/clopidogrel, statin  Pump - Severe reduced LVEF. s/p Bumetanide gtt. hypotension requiring vasopressin 10/12--weaned 10/14, now on midodrine 10 mg Q8h. on exam perfusing well, lactate improved. low concern for cardiogenic shock. low concern for sepsis as cultures negative and pt on IV abx. AM cortisol normal. s/p 1 unit PRBCS with good response. will diurese today with 2 mg PO Bumex.   Valves - S/p MitraClip. No active issues.  Rhythm - AVNRT. episode last week responded to adenosine, again on 10/13 given metoprolol. EP consulted. started on metoprolol tartrate 12.5 mg BID- BP appears to tolerate, pt remains off pressors. Can give adenosine or try vagal maneuvers if SVT recurs.    #Neuro - No active issues  #Resp - Off supplemental O2, sating well on RA.   #GI - Heart healthy diet. Trend liver tests, downtrending   #Endo - FSG's elevated to 400s but labile. on Lantus and Humalog. Dr. Darren liao recs appreciated.   C/w home med Synthroid.   #Renal - Vol optimize, as above. MERVIN likely 2/2 prerenal from heart strain. continue to monitor creatinine - downtrending. follow up renal workup labs- specimen received as of 10/14: DEAN, C-ANCA, SPEP/UPEP, cryoglobulin, immunofixation, RPR, syphilis screen, Hep B. C3, C4 normal.   #ID - Leukocytosis resolving. Sepsis w/u unremarkable thus far- BCx and UCx NGTD; s/p 7 day course of cefepime.   #Hem - HSQ for DVT PPX. Anemia- iron studies showing normal ferritin/iron binding capacity- ?CHRISTOPHE. normal hemolysis labs. s/p 1 unit PRBCS for Hb < 8 --> responded appropriately to > 9  #Ethics - FC.  Dispo: PT consulted, awaiting recs

## 2019-10-15 NOTE — PROGRESS NOTE ADULT - SUBJECTIVE AND OBJECTIVE BOX
[facility]   MD Vanita Dasilva D.O. Fatima Sheikh, D.O. Ruoro Wong, PA    From 7 AM - 5 PM:  OFFICE: [phone]  Dr. Muhammad cell: [phone]  Dr. Bowers cell: [phone]  Dr. Soria cell: [phone]  RASHIDA Smith cell: [phone]    From 5 PM - 7 AM: Answering Service: [phone]        RENAL FOLLOW UP NOTE  --------------------------------------------------------------------------------  HPI: Pt seen and examined. States she feels mildly SOB and has a cough. Denies any fevers, chills, CP, urinary symptoms.        PAST HISTORY  --------------------------------------------------------------------------------  No significant changes to PMH, PSH, FHx, SHx, unless otherwise noted    ALLERGIES & MEDICATIONS  --------------------------------------------------------------------------------  Allergies    azithromycin (Hives; Pruritus)    Intolerances      Standing Inpatient Medications  aspirin enteric coated 81 milliGRAM(s) Oral daily  atorvastatin 80 milliGRAM(s) Oral at bedtime  buMETAnide 2 milliGRAM(s) Oral once  chlorhexidine 2% Cloths 1 Application(s) Topical <User Schedule>  clopidogrel Tablet 75 milliGRAM(s) Oral daily  dextrose 5%. 1000 milliLiter(s) IV Continuous <Continuous>  dextrose 50% Injectable 12.5 Gram(s) IV Push once  dextrose 50% Injectable 25 Gram(s) IV Push once  dextrose 50% Injectable 25 Gram(s) IV Push once  docusate sodium 100 milliGRAM(s) Oral two times a day  heparin  Injectable 5000 Unit(s) SubCutaneous every 12 hours  insulin glargine Injectable (LANTUS) 60 Unit(s) SubCutaneous at bedtime  insulin lispro (HumaLOG) corrective regimen sliding scale   SubCutaneous three times a day before meals  insulin lispro (HumaLOG) corrective regimen sliding scale   SubCutaneous at bedtime  insulin lispro Injectable (HumaLOG) 18 Unit(s) SubCutaneous three times a day before meals  levothyroxine 50 MICROGram(s) Oral daily  melatonin 5 milliGRAM(s) Oral at bedtime  metoprolol tartrate 12.5 milliGRAM(s) Oral two times a day  midodrine 10 milliGRAM(s) Oral every 8 hours  pantoprazole    Tablet 40 milliGRAM(s) Oral before breakfast  polyethylene glycol 3350 17 Gram(s) Oral daily  senna 2 Tablet(s) Oral at bedtime    PRN Inpatient Medications  ALPRAZolam 0.25 milliGRAM(s) Oral three times a day PRN  dextrose 40% Gel 15 Gram(s) Oral once PRN  glucagon  Injectable 1 milliGRAM(s) IntraMuscular once PRN      REVIEW OF SYSTEMS  --------------------------------------------------------------------------------  General: no fever  CVS: no chest pain  RESP: + mild SOB and cough  ABD: no abdominal pain  : no dysuria,  MSK: no edema     VITALS/PHYSICAL EXAM  --------------------------------------------------------------------------------  T(C): 36.5 (10-15-19 @ 10:00), Max: 37 (10-14-19 @ 16:00)  HR: 72 (10-15-19 @ 10:52) (62 - 84)  BP: --  RR: 37 (10-15-19 @ 10:52) (11 - 37)  SpO2: 100% (10-15-19 @ 10:52) (94% - 100%)  Wt(kg): --        10-14-19 @ 07:01  -  10-15-19 @ 07:00  --------------------------------------------------------  IN: 1253.6 mL / OUT: 1025 mL / NET: 228.6 mL    10-15-19 @ 07:01  -  10-15-19 @ 11:27  --------------------------------------------------------  IN: 0 mL / OUT: 300 mL / NET: -300 mL      Physical Exam:  	Gen: NAD  	HEENT: MMM  	Pulm: CTA B/L  	CV: S1S2, + murmur  	Abd: Soft, +BS  	Ext: No LE edema B/L              Neuro: Awake   	Skin: Warm and Dry   	    LABS/STUDIES  --------------------------------------------------------------------------------              9.3    12.41 >-----------<  230      [10-15-19 @ 04:19]              29.6     141  |  105  |  89  ----------------------------<  240      [10-15-19 @ 04:19]  3.6   |  19  |  2.09        Ca     9.3     [10-15-19 @ 04:19]      Mg     2.6     [10-15-19 @ 04:19]      Phos  3.3     [10-15-19 @ 04:19]    TPro  7.4  /  Alb  3.5  /  TBili  0.5  /  DBili  x   /  AST  74  /  ALT  371  /  AlkPhos  103  [10-15-19 @ 04:19]        CK 22      [10-14-19 @ 05:51]    Creatinine Trend:  SCr 2.09 [10-15 @ 04:19]  SCr 2.29 [10-14 @ 05:51]  SCr 2.36 [10-13 @ 12:27]  SCr 2.52 [10-13 @ 04:46]  SCr 2.64 [10-12 @ 23:37]    Urinalysis - [10-08-19 @ 02:35]      Color Yellow / Appearance Clear / SG 1.016 / pH 5.5      Gluc Trace / Ketone Negative  / Bili Negative / Urobili Negative       Blood Negative / Protein 30 mg/dL / Leuk Est Negative / Nitrite Negative      RBC 1 / WBC 0 / Hyaline 6 / Gran 3-5 / Sq Epi  / Non Sq Epi 2 / Bacteria Negative    Urine Creatinine 20      [10-08-19 @ 20:53]  Urine Protein 20      [10-08-19 @ 23:13]  Urine Sodium 67      [10-08-19 @ 20:53]  Urine Osmolality 318      [10-08-19 @ 21:27]    Iron 44, TIBC 424, %sat 10      [10-13-19 @ 04:48]  Ferritin 78      [10-13-19 @ 04:46]  .4 (Ca --)      [04-25-19 @ 05:45]   --  Vitamin D (25OH) 25.9      [05-01-19 @ 04:00]  HbA1c 7.5      [10-08-19 @ 14:30]  TSH 1.01      [10-08-19 @ 14:47]  Lipid: chol 83, , HDL 14, LDL 46      [10-08-19 @ 14:48]    HBsAb Nonreact      [10-14-19 @ 09:34]  HBsAg Nonreact      [10-14-19 @ 09:34]  HCV 0.13, Nonreact      [10-14-19 @ 09:34]  HIV Nonreact      [10-14-19 @ 09:23]    C3 Complement 111      [10-14-19 @ 09:35]  C4 Complement 39      [10-14-19 @ 09:35]  Rheumatoid Factor <10      [10-14-19 @ 09:30]

## 2019-10-15 NOTE — CHART NOTE - NSCHARTNOTEFT_GEN_A_CORE
Called by RN for patient with asymptomatic hypoglycemia.  Patient does not desire to eat whole dinner.  Pre-meal insulin held this evening and insulins down-adjusted per d/w Endocrinology.    Zeina Brady NP  [phone]

## 2019-10-15 NOTE — PHYSICAL THERAPY INITIAL EVALUATION ADULT - PERTINENT HX OF CURRENT PROBLEM, REHAB EVAL
72 yo F PMHx of HTN, HLD, CAD s/p CABG, severe mitral regurgitation s/p mitral clip, severe pulmonary HTN, CKD IV, p/w 2 day history of SOB. Pt states she has been feeling increasingly short of breath for the past couple of days more pronounced with exertion a/w chest heaviness likely due to NSTEMI +/- ADHF. Now s/p RHC/IABP without LHC due to MERVIN on CKD, now weaned off. Previously with runs of SVT that received adenosine/metoprolol, hypotensive requiring vasopressin.

## 2019-10-15 NOTE — PHYSICAL THERAPY INITIAL EVALUATION ADULT - ADDITIONAL COMMENTS
pt lives in private home with son and daughter in law. 3 steps to enter, 1st floor set up. FRANCESCA M-F x35hrs. pt lives in private home with son and daughter in law. 3 steps to enter, 1st floor set up. HHA M-F x35hrs. Pt required assist with ADLs, states she ambulated without AD.

## 2019-10-15 NOTE — DISCHARGE NOTE PROVIDER - PROVIDER TOKENS
FREE:[LAST:[Wang],FIRST:[Obi],PHONE:[phone]],FAX:[(   )    -]] FREE:[LAST:[Wang],FIRST:[Obi],PHONE:[phone]],FAX:[(   )    -]],PROVIDER:[TOKEN:[[id number]]]

## 2019-10-15 NOTE — PROGRESS NOTE ADULT - ASSESSMENT
72 yo F w/ PMH of CKD stage 4 (baseline Cr 1.9-2.2) HTN, T2DM, CAD s/p CABG X3 (2014 at [facility]), non-dilated ICM (EF 20-25%), severe mitral regurgitation s/p mitral clip (6/13/19), severe pulm HTN, hypothyroidism who presented for evaluation of chest pain, cough, and SOB for the past 2 days and was admitted for ADHF. s/p IABP 10/8/19, removed 10/12/19. Currently medically managing heart failure, no plans for cath at this time. Nephrology is following for MERVIN    MERVIN on CKD stage 4  - baseline Cr 1.9-2.2; has had recurrent MERVIN's over the years  - CKD is likely 2/2 recurrent MERVIN's + underlying DM/HTN  - MERVIN is likely 2/2 CRS     - admitted w/ cr 3; now back to baseline Cr   - monitor off diuretic for now     - renal sonogram w/ parenchymal changes, no hydro, no stones, left simple renal cyst  - Avoid nephrotoxins including NSAIDs, IV contrast (if possible), [employer]  - maintain MAP >65, currently on vasopressin drip  - monitor I/O's accurately    - according to Sandip et. al. NGHIA risk calculator, pt has a 57.3% risk of NGHIA post-cath and a 12.6% risk of needing dialysis post-cath (if 100 mL contrast is used and given her current parameters)    - no indication for renal replacement therapy at this time    - since renal function is improving, LHC is less risky at this time. However, since she has underlying renal disease, she is still higher risk for NGHIA/needing dialysis post-cath. This was explained to pt's daughter previously (see below). Cardio/ICU team planning on medical management for now.    10/10/19: Had a long conversation w/ pt's daughter, Ms. Elsa Negrete [phone], regarding renal function and need for cardiac cath. She is agreeable w/ whatever plan the cardiology/medical team decides on as she is not sure what the best option is for her mother. At this time, I explained the possibility that pt may need dialysis even if she does not have cardiac cath given severely decreased renal and cardiac function. I explained that she is at a higher risk of needing dialysis If cardiac cath is performed (especially if performed emergently w/ current MERVIN). Ms. Negrete is concerned that her mother is weak and cannot withstand the catheterization and dialysis but re-iterated to do whatever the cardiology/medical team thinks is best. I informed her that currently, there is no emergent need for cardiac cath and that the cardiology team is awaiting renal improvement prior to cardiac cath (per resident, as of this morning) and that I agree w/ this decision, however, should cardiac catheterization become urgent/emergent, that I would agree w/ catheterization and close follow up of renal function post-cath.    Metabolic acidosis  - 2/2 lactic acidosis 2/2 hypoperfusion/hypotension/dec cardiac output  - lactic acidosis resolved  - can start sodium bicarb 650 mg PO daily  - monitor    Hypotension  - likely from dec cardiac output and hypoperfusion  - hold anti-hypertensives  - beta blocker per cardio recs (needed for AVNRT)  - continue midodrine to maintain MAP >65    Proteinuria/Hematuria  - likely from underlying DM  - Hep B, Hep C, HIV RF neg  - check spot urine protein/Cr, syphilis, SPEP, UPEP, serum immunofixation, ANCA, C3 and C4 complement levels, DEAN, cryoglobulin (ordered for candice AM)

## 2019-10-15 NOTE — CHART NOTE - NSCHARTNOTEFT_GEN_A_CORE
CCU Transfer Note    Transfer from: CCU    Transfer to: (  ) Medicine    (x  ) Telemetry    (  ) RCU                               (  ) Palliative    (  ) Stroke Unit    (  ) MICU    (  ) __________________    Accepting physician: Dr. Salazar     Sign out given to:     HPI / CCU COURSE:  72 yo F with HTN, HLD, DM2 (A1c 7.5 10/19), CAD s/p CABG (LIMA to LAD, SVG to OM, SVG to PDA) and prior PCI, severe mitral regurgitation s/p mitral clip (6/19), severe pulmonary HTN, CKD IV (b/l Cr 2-2.2), hypothyroidism presented with two days of SOB. In the ED was given asa 162 mg, zosyn x1, vanc 1 g. VS in ED 98 F, BP 86/54, HR 54, RR 48, placed on Bipap 10/5. Pt admitted to CCU for acute decompensated heart failure. She was diuresed and successfully weaned off bipap to room air. Trops were elevated to 1800, pt was started on heparin gtt for NSTEMI. Pt underwent RHC and placement of [place] and intra-aortic balloon pump. RHC showed elevated filling pressures and borderline low CI (2.3) with /80s s/p IABP. Was diuresed with improvement in filling pressures but has persistent renal dysfunction. LHC deferred in light of MERVIN on CKD, creatinine in 3 range from baseline of 2. Heart Failure team was consulted. Advised to wean off IABP. On 10/10 pt had two episodes of SVT which resolved s/p 6 mg adenosine IVP. Metoprolol 12.5 mg BID started with good response- no further episodes of SVT. Weaned off of intra-aortic balloon pump. Due to leukocytosis empiric vanc/zosyn were given, switched to cefepime to be completed on 10/14 for 7 day course, cultures negative to date. Became hypotensive to 70s/40s after metoprolol dose was increased to 25 mg, required vasopressin. Lactate was stable and low suspicion for cardiogenic shock. s/p 1 unit prbcs with good response- Hgbs remained in 9 range. 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 Pt was weaned off vaso to midodrine 10 mg TID. She tolerated metoprolol 12.5 mg and was stable for transfer to floors.     ASSESSMENT & PLAN:   72 y/o F w/ PMH of HTN, HLD, DM2, GERD, CAD s/p CABG (LIMA to LAD, SVG to OM, SVG to PDA; 2014), severe MR s/p MitraClip, severe pHTN, CKD4, hypoTH c/o dyspnea x 2 days with concern for NSTEMI +/- ADHF w/ evidence of organ hypoperfusion s/p balloon pump.    #NSTEMI   -downtrending trops. S/p IABP to improve coronary perfusion. Deferred LHC in light of poor renal function and daughter's concern about pt requiring dialysis after cath.   -cw medical management- ASA/clopidogrel/statin    #AVNRT  -cw metoprolol 12.5 mg BID. if SVT recurs give 6 mg IVP of adenosine or try vagal maneuvers.     #HFrEF   -s/p diuresis and IABP  -strict I/O's, daily standing weights   -cw metoprolol 12.5 BID    #Anemia  -became anemic to 7's, baseline ~9  -iron studies 10/13 showing normal ferritin/iron binding capacity. normal haptoglobin/bili.   -s/p 1 unit PRBCS for Hb < 8. responded appropriately to > 9. stable.     #T2DM   -Trend BGFS on Lantus and Humalog. endo recs appreciated  -C/w home med Synthroid    #CKD   -continue to monitor creatinine- now at baseline. renal recs appreciated.   -follow up renal workup labs- specimen received as of 10/14: DEAN, C-ANCA, SPEP/UPEP, cryoglobulin, immunofixation, RPR, syphilis screen, Hep B. C3, C4 normal.    -avoid nephrotoxins, trend BMP    #Leukocytosis to 10-12 since admission  -Sepsis w/u unremarkable thus far. BCx and UCx 10/8 negative. s/p 7 days IV abx. s/p Vanc/zosyn 10/7-10/9. cefepime 10/9-10/15  -monitor for signs of infection, no clear source at this time and pt remains afebrile     #Transaminitis in setting of congestive hepatopathy   -on admission elevated to 2000s. now normalizing   -trend BMP    #GERD  -cw home protonix     FOR FOLLOW UP:  [ ] monitor BPs-- on midodrine 10 mg Q8h   [ ] monitor on tele-- cw metoprolol 12.5 mg BID for AVNRT   [ ] monitor finger sticks-- endo following

## 2019-10-15 NOTE — PROVIDER CONTACT NOTE (OTHER) - ASSESSMENT
Pt A&Ox4, English speaking. Pt w/o s/s of hypoglycemia at this time. Pt denies dizziness, lightheadedness, dizziness, shakiness.

## 2019-10-16 LAB
ALBUMIN SERPL ELPH-MCNC: 3.4 G/DL — SIGNIFICANT CHANGE UP (ref 3.3–5)
ALP SERPL-CCNC: 101 U/L — SIGNIFICANT CHANGE UP (ref 40–120)
ALT FLD-CCNC: 251 U/L — HIGH (ref 10–45)
ANA PAT FLD IF-IMP: ABNORMAL
ANA TITR SER: ABNORMAL
ANION GAP SERPL CALC-SCNC: 15 MMOL/L — SIGNIFICANT CHANGE UP (ref 5–17)
AST SERPL-CCNC: 49 U/L — HIGH (ref 10–40)
BASOPHILS # BLD AUTO: 0.04 K/UL — SIGNIFICANT CHANGE UP (ref 0–0.2)
BASOPHILS NFR BLD AUTO: 0.4 % — SIGNIFICANT CHANGE UP (ref 0–2)
BILIRUB SERPL-MCNC: 0.5 MG/DL — SIGNIFICANT CHANGE UP (ref 0.2–1.2)
BUN SERPL-MCNC: 72 MG/DL — HIGH (ref 7–23)
CALCIUM SERPL-MCNC: 9.3 MG/DL — SIGNIFICANT CHANGE UP (ref 8.4–10.5)
CHLORIDE SERPL-SCNC: 105 MMOL/L — SIGNIFICANT CHANGE UP (ref 96–108)
CO2 SERPL-SCNC: 22 MMOL/L — SIGNIFICANT CHANGE UP (ref 22–31)
CREAT SERPL-MCNC: 1.89 MG/DL — HIGH (ref 0.5–1.3)
CREATININE, URINE RESULT: 40 MG/DL — SIGNIFICANT CHANGE UP
EOSINOPHIL # BLD AUTO: 0.5 K/UL — SIGNIFICANT CHANGE UP (ref 0–0.5)
EOSINOPHIL NFR BLD AUTO: 4.7 % — SIGNIFICANT CHANGE UP (ref 0–6)
GLUCOSE BLDC GLUCOMTR-MCNC: 153 MG/DL — HIGH (ref 70–99)
GLUCOSE BLDC GLUCOMTR-MCNC: 172 MG/DL — HIGH (ref 70–99)
GLUCOSE BLDC GLUCOMTR-MCNC: 206 MG/DL — HIGH (ref 70–99)
GLUCOSE BLDC GLUCOMTR-MCNC: 289 MG/DL — HIGH (ref 70–99)
GLUCOSE SERPL-MCNC: 161 MG/DL — HIGH (ref 70–99)
HBV E AG SER-ACNC: NEGATIVE — SIGNIFICANT CHANGE UP
HCT VFR BLD CALC: 32.7 % — LOW (ref 34.5–45)
HGB BLD-MCNC: 10.2 G/DL — LOW (ref 11.5–15.5)
IMM GRANULOCYTES NFR BLD AUTO: 1.4 % — SIGNIFICANT CHANGE UP (ref 0–1.5)
LYMPHOCYTES # BLD AUTO: 19.1 % — SIGNIFICANT CHANGE UP (ref 13–44)
LYMPHOCYTES # BLD AUTO: 2.03 K/UL — SIGNIFICANT CHANGE UP (ref 1–3.3)
MAGNESIUM SERPL-MCNC: 2.5 MG/DL — SIGNIFICANT CHANGE UP (ref 1.6–2.6)
MCHC RBC-ENTMCNC: 27 PG — SIGNIFICANT CHANGE UP (ref 27–34)
MCHC RBC-ENTMCNC: 31.2 GM/DL — LOW (ref 32–36)
MCV RBC AUTO: 86.5 FL — SIGNIFICANT CHANGE UP (ref 80–100)
MONOCYTES # BLD AUTO: 1.24 K/UL — HIGH (ref 0–0.9)
MONOCYTES NFR BLD AUTO: 11.7 % — SIGNIFICANT CHANGE UP (ref 2–14)
NEUTROPHILS # BLD AUTO: 6.65 K/UL — SIGNIFICANT CHANGE UP (ref 1.8–7.4)
NEUTROPHILS NFR BLD AUTO: 62.7 % — SIGNIFICANT CHANGE UP (ref 43–77)
NRBC # BLD: 0 /100 WBCS — SIGNIFICANT CHANGE UP (ref 0–0)
PHOSPHATE SERPL-MCNC: 2.4 MG/DL — LOW (ref 2.5–4.5)
PLATELET # BLD AUTO: 238 K/UL — SIGNIFICANT CHANGE UP (ref 150–400)
POTASSIUM SERPL-MCNC: 4.1 MMOL/L — SIGNIFICANT CHANGE UP (ref 3.5–5.3)
POTASSIUM SERPL-SCNC: 4.1 MMOL/L — SIGNIFICANT CHANGE UP (ref 3.5–5.3)
PROT ?TM UR-MCNC: 72 MG/DL — HIGH (ref 0–12)
PROT SERPL-MCNC: 7.5 G/DL — SIGNIFICANT CHANGE UP (ref 6–8.3)
RBC # BLD: 3.78 M/UL — LOW (ref 3.8–5.2)
RBC # FLD: 18.1 % — HIGH (ref 10.3–14.5)
RPR SER-TITR: SIGNIFICANT CHANGE UP
RPR SERPL-ACNC: SIGNIFICANT CHANGE UP
SODIUM SERPL-SCNC: 142 MMOL/L — SIGNIFICANT CHANGE UP (ref 135–145)
T PALLIDUM AB TITR SER: POSITIVE
T PALLIDUM IGG SER QL IF: REACTIVE
WBC # BLD: 10.61 K/UL — HIGH (ref 3.8–10.5)
WBC # FLD AUTO: 10.61 K/UL — HIGH (ref 3.8–10.5)

## 2019-10-16 RX ORDER — ALPRAZOLAM 0.25 MG
0.25 TABLET ORAL THREE TIMES A DAY
Refills: 0 | Status: DISCONTINUED | OUTPATIENT
Start: 2019-10-16 | End: 2019-10-18

## 2019-10-16 RX ADMIN — HEPARIN SODIUM 5000 UNIT(S): 5000 INJECTION INTRAVENOUS; SUBCUTANEOUS at 05:13

## 2019-10-16 RX ADMIN — Medication 81 MILLIGRAM(S): at 13:30

## 2019-10-16 RX ADMIN — Medication 10 UNIT(S): at 17:41

## 2019-10-16 RX ADMIN — PANTOPRAZOLE SODIUM 40 MILLIGRAM(S): 20 TABLET, DELAYED RELEASE ORAL at 05:12

## 2019-10-16 RX ADMIN — Medication 100 MILLIGRAM(S): at 17:41

## 2019-10-16 RX ADMIN — MIDODRINE HYDROCHLORIDE 10 MILLIGRAM(S): 2.5 TABLET ORAL at 21:47

## 2019-10-16 RX ADMIN — Medication 10 UNIT(S): at 09:47

## 2019-10-16 RX ADMIN — MIDODRINE HYDROCHLORIDE 10 MILLIGRAM(S): 2.5 TABLET ORAL at 13:31

## 2019-10-16 RX ADMIN — INSULIN GLARGINE 50 UNIT(S): 100 INJECTION, SOLUTION SUBCUTANEOUS at 22:06

## 2019-10-16 RX ADMIN — Medication 50 MICROGRAM(S): at 05:12

## 2019-10-16 RX ADMIN — Medication 5 MILLIGRAM(S): at 21:47

## 2019-10-16 RX ADMIN — HEPARIN SODIUM 5000 UNIT(S): 5000 INJECTION INTRAVENOUS; SUBCUTANEOUS at 17:41

## 2019-10-16 RX ADMIN — Medication 100 MILLIGRAM(S): at 05:12

## 2019-10-16 RX ADMIN — ATORVASTATIN CALCIUM 80 MILLIGRAM(S): 80 TABLET, FILM COATED ORAL at 21:47

## 2019-10-16 RX ADMIN — CHLORHEXIDINE GLUCONATE 1 APPLICATION(S): 213 SOLUTION TOPICAL at 09:48

## 2019-10-16 RX ADMIN — POLYETHYLENE GLYCOL 3350 17 GRAM(S): 17 POWDER, FOR SOLUTION ORAL at 13:31

## 2019-10-16 RX ADMIN — Medication 12.5 MILLIGRAM(S): at 05:12

## 2019-10-16 RX ADMIN — SENNA PLUS 2 TABLET(S): 8.6 TABLET ORAL at 21:47

## 2019-10-16 RX ADMIN — Medication 10 UNIT(S): at 13:32

## 2019-10-16 RX ADMIN — Medication 1: at 13:31

## 2019-10-16 RX ADMIN — MIDODRINE HYDROCHLORIDE 10 MILLIGRAM(S): 2.5 TABLET ORAL at 05:12

## 2019-10-16 RX ADMIN — Medication 1: at 22:06

## 2019-10-16 RX ADMIN — CLOPIDOGREL BISULFATE 75 MILLIGRAM(S): 75 TABLET, FILM COATED ORAL at 13:31

## 2019-10-16 RX ADMIN — Medication 1: at 09:47

## 2019-10-16 RX ADMIN — Medication 12.5 MILLIGRAM(S): at 17:41

## 2019-10-16 RX ADMIN — Medication 2: at 17:41

## 2019-10-16 NOTE — PROGRESS NOTE ADULT - ASSESSMENT
Assessment  DMT2: 73y Female with DM T2 with hyperglycemia, A1C 7.5%, blood sugars  fluctuating, on large dose basal bolus insulin, had hypoglycemic episode, insulin dose was decreased, she is eating full meals, FS improving, transferred to medical floor.  SOB: on meds, FU Heart Failure team.  CAD: S/P CABG previous admission, on medications, no chest pain, stable, monitored.  Hypothyroidism: On Synthroid 50 mcg po daily, compliant with Synthroid intake, asymptomatic.  HTN: Controlled,  on antihypertensive medications.  CKD: Monitor labs/BMP,           Jillian Banks MD  Cell: [phone]  Office: [phone]

## 2019-10-16 NOTE — PROGRESS NOTE ADULT - SUBJECTIVE AND OBJECTIVE BOX
Chief complaint  Patient is a 73y old  Female who presents with a chief complaint of Acute decompensated heart failure, SOB (16 Oct 2019 08:47)   Review of systems  Patient in bed, looks comfortable, no fever, had hypoglycemia.    Labs and Fingersticks  CAPILLARY BLOOD GLUCOSE      POCT Blood Glucose.: 153 mg/dL (16 Oct 2019 09:03)  POCT Blood Glucose.: 354 mg/dL (15 Oct 2019 22:11)  POCT Blood Glucose.: 174 mg/dL (15 Oct 2019 19:02)  POCT Blood Glucose.: 82 mg/dL (15 Oct 2019 18:30)  POCT Blood Glucose.: 93 mg/dL (15 Oct 2019 18:10)  POCT Blood Glucose.: 64 mg/dL (15 Oct 2019 17:56)  POCT Blood Glucose.: 64 mg/dL (15 Oct 2019 17:55)  POCT Blood Glucose.: 307 mg/dL (15 Oct 2019 11:46)      Anion Gap, Serum: 15 (10-16 @ 06:09)  Anion Gap, Serum: 17 (10-15 @ 04:19)      Calcium, Total Serum: 9.3 (10-16 @ 06:09)  Calcium, Total Serum: 9.3 (10-15 @ 04:19)  Albumin, Serum: 3.4 (10-16 @ 06:09)  Albumin, Serum: 3.4 <L> (10-15 @ 14:35)  Albumin, Serum: 3.5 (10-15 @ 04:19)  Albumin, Serum: 3.4 <L> (10-14 @ 09:35)    Alanine Aminotransferase (ALT/SGPT): 251 <H> (10-16 @ 06:09)  Alanine Aminotransferase (ALT/SGPT): 371 <H> (10-15 @ 04:19)  Alkaline Phosphatase, Serum: 101 (10-16 @ 06:09)  Alkaline Phosphatase, Serum: 103 (10-15 @ 04:19)  Aspartate Aminotransferase (AST/SGOT): 49 <H> (10-16 @ 06:09)  Aspartate Aminotransferase (AST/SGOT): 74 <H> (10-15 @ 04:19)        10-16    142  |  105  |  72<H>  ----------------------------<  161<H>  4.1   |  22  |  1.89<H>    Ca    9.3      16 Oct 2019 06:09  Phos  2.4     10-16  Mg     2.5     10-16    TPro  7.5  /  Alb  3.4  /  TBili  0.5  /  DBili  x   /  AST  49<H>  /  ALT  251<H>  /  AlkPhos  101  10-16                        10.2   10.61 )-----------( 238      ( 16 Oct 2019 06:09 )             32.7     Medications  MEDICATIONS  (STANDING):  aspirin enteric coated 81 milliGRAM(s) Oral daily  atorvastatin 80 milliGRAM(s) Oral at bedtime  chlorhexidine 2% Cloths 1 Application(s) Topical <User Schedule>  clopidogrel Tablet 75 milliGRAM(s) Oral daily  dextrose 5%. 1000 milliLiter(s) (50 mL/Hr) IV Continuous <Continuous>  dextrose 50% Injectable 12.5 Gram(s) IV Push once  dextrose 50% Injectable 25 Gram(s) IV Push once  dextrose 50% Injectable 25 Gram(s) IV Push once  docusate sodium 100 milliGRAM(s) Oral two times a day  heparin  Injectable 5000 Unit(s) SubCutaneous every 12 hours  insulin glargine Injectable (LANTUS) 50 Unit(s) SubCutaneous at bedtime  insulin lispro (HumaLOG) corrective regimen sliding scale   SubCutaneous three times a day before meals  insulin lispro (HumaLOG) corrective regimen sliding scale   SubCutaneous at bedtime  insulin lispro Injectable (HumaLOG) 10 Unit(s) SubCutaneous three times a day before meals  levothyroxine 50 MICROGram(s) Oral daily  melatonin 5 milliGRAM(s) Oral at bedtime  metoprolol tartrate 12.5 milliGRAM(s) Oral two times a day  midodrine 10 milliGRAM(s) Oral every 8 hours  pantoprazole    Tablet 40 milliGRAM(s) Oral before breakfast  polyethylene glycol 3350 17 Gram(s) Oral daily  senna 2 Tablet(s) Oral at bedtime      Physical Exam  General: Patient comfortable in bed  Vital Signs Last 12 Hrs  T(F): 98.3 (10-16-19 @ 04:09), Max: 98.3 (10-16-19 @ 04:09)  HR: 80 (10-16-19 @ 04:09) (73 - 80)  BP: 110/71 (10-16-19 @ 04:09) (110/71 - 112/71)  BP(mean): --  RR: 20 (10-16-19 @ 04:09) (20 - 20)  SpO2: 98% (10-16-19 @ 04:09) (98% - 100%)  Neck: No palpable thyroid nodules.  CVS: S1S2, No murmurs  Respiratory: No wheezing, no crepitations  GI: Abdomen soft, bowel sounds positive  Musculoskeletal:  edema lower extremities.   Skin: No skin rashes, no ecchymosis    Diagnostics

## 2019-10-16 NOTE — PROGRESS NOTE ADULT - SUBJECTIVE AND OBJECTIVE BOX
[facility] [place] [facility]   MD Vanita Dasilva D.O. Fatima Sheikh, D.O. Ruoro Wong, PA    From 7 AM - 5 PM:  OFFICE: [phone]  Dr. Muhammad cell: [phone]  Dr. Bowers cell: [phone]  Dr. Soria cell: [phone]  RASHIDA Smith cell: [phone]    From 5 PM - 7 AM: Answering Service: [phone]        RENAL FOLLOW UP NOTE  --------------------------------------------------------------------------------  HPI: Pt seen and examined. Has no complaints except feeling tired this AM. Denies SOB, cough, pain.        PAST HISTORY  --------------------------------------------------------------------------------  No significant changes to PMH, PSH, FHx, SHx, unless otherwise noted    ALLERGIES & MEDICATIONS  --------------------------------------------------------------------------------  Allergies    azithromycin (Hives; Pruritus)    Intolerances      Standing Inpatient Medications  aspirin enteric coated 81 milliGRAM(s) Oral daily  atorvastatin 80 milliGRAM(s) Oral at bedtime  chlorhexidine 2% Cloths 1 Application(s) Topical <User Schedule>  clopidogrel Tablet 75 milliGRAM(s) Oral daily  dextrose 5%. 1000 milliLiter(s) IV Continuous <Continuous>  dextrose 50% Injectable 12.5 Gram(s) IV Push once  dextrose 50% Injectable 25 Gram(s) IV Push once  dextrose 50% Injectable 25 Gram(s) IV Push once  docusate sodium 100 milliGRAM(s) Oral two times a day  heparin  Injectable 5000 Unit(s) SubCutaneous every 12 hours  insulin glargine Injectable (LANTUS) 50 Unit(s) SubCutaneous at bedtime  insulin lispro (HumaLOG) corrective regimen sliding scale   SubCutaneous three times a day before meals  insulin lispro (HumaLOG) corrective regimen sliding scale   SubCutaneous at bedtime  insulin lispro Injectable (HumaLOG) 10 Unit(s) SubCutaneous three times a day before meals  levothyroxine 50 MICROGram(s) Oral daily  melatonin 5 milliGRAM(s) Oral at bedtime  metoprolol tartrate 12.5 milliGRAM(s) Oral two times a day  midodrine 10 milliGRAM(s) Oral every 8 hours  pantoprazole    Tablet 40 milliGRAM(s) Oral before breakfast  polyethylene glycol 3350 17 Gram(s) Oral daily  senna 2 Tablet(s) Oral at bedtime    PRN Inpatient Medications  ALPRAZolam 0.25 milliGRAM(s) Oral three times a day PRN  dextrose 40% Gel 15 Gram(s) Oral once PRN  glucagon  Injectable 1 milliGRAM(s) IntraMuscular once PRN      REVIEW OF SYSTEMS  --------------------------------------------------------------------------------  General: no fever  CVS: no chest pain  RESP: no sob, no cough  ABD: no abdominal pain  : no dysuria,  MSK: no edema     VITALS/PHYSICAL EXAM  --------------------------------------------------------------------------------  T(C): 36.8 (10-16-19 @ 04:09), Max: 37.2 (10-15-19 @ 17:48)  HR: 80 (10-16-19 @ 04:09) (70 - 80)  BP: 110/71 (10-16-19 @ 04:09) (110/71 - 113/74)  RR: 20 (10-16-19 @ 04:09) (20 - 37)  SpO2: 98% (10-16-19 @ 04:09) (98% - 100%)  Wt(kg): --        10-15-19 @ 07:01  -  10-16-19 @ 07:00  --------------------------------------------------------  IN: 580 mL / OUT: 1500 mL / NET: -920 mL      Physical Exam:  	Gen: NAD  	HEENT: MMM  	Pulm: CTA B/L  	CV: S1S2, + murmur  	Abd: Soft, +BS  	Ext: No LE edema B/L              Neuro: Awake   	Skin: Warm and Dry       LABS/STUDIES  --------------------------------------------------------------------------------              10.2   10.61 >-----------<  238      [10-16-19 @ 06:09]              32.7     142  |  105  |  72  ----------------------------<  161      [10-16-19 @ 06:09]  4.1   |  22  |  1.89        Ca     9.3     [10-16-19 @ 06:09]      Mg     2.5     [10-16-19 @ 06:09]      Phos  2.4     [10-16-19 @ 06:09]    TPro  7.5  /  Alb  3.4  /  TBili  0.5  /  DBili  x   /  AST  49  /  ALT  251  /  AlkPhos  101  [10-16-19 @ 06:09]          Creatinine Trend:  SCr 1.89 [10-16 @ 06:09]  SCr 2.09 [10-15 @ 04:19]  SCr 2.29 [10-14 @ 05:51]  SCr 2.36 [10-13 @ 12:27]  SCr 2.52 [10-13 @ 04:46]    Urinalysis - [10-08-19 @ 02:35]      Color Yellow / Appearance Clear / SG 1.016 / pH 5.5      Gluc Trace / Ketone Negative  / Bili Negative / Urobili Negative       Blood Negative / Protein 30 mg/dL / Leuk Est Negative / Nitrite Negative      RBC 1 / WBC 0 / Hyaline 6 / Gran 3-5 / Sq Epi  / Non Sq Epi 2 / Bacteria Negative    Urine Creatinine 40      [10-15-19 @ 16:32]  Urine Protein 70      [10-15-19 @ 16:32]    Iron 44, TIBC 424, %sat 10      [10-13-19 @ 04:48]  Ferritin 78      [10-13-19 @ 04:46]  .4 (Ca --)      [04-25-19 @ 05:45]   --  Vitamin D (25OH) 25.9      [05-01-19 @ 04:00]  HbA1c 7.5      [10-08-19 @ 14:30]  TSH 1.01      [10-08-19 @ 14:47]  Lipid: chol 83, , HDL 14, LDL 46      [10-08-19 @ 14:48]    HBsAb Nonreact      [10-14-19 @ 09:34]  HBsAg Nonreact      [10-14-19 @ 09:34]  HCV 0.13, Nonreact      [10-14-19 @ 09:34]  HIV Nonreact      [10-14-19 @ 09:23]    C3 Complement 111      [10-14-19 @ 09:35]  C4 Complement 39      [10-14-19 @ 09:35]  Rheumatoid Factor <10      [10-14-19 @ 09:30]  ANCA: cANCA Negative, pANCA Negative, atypical ANCA Indeterminate      [10-14-19 @ 09:23]  Immunofixation Serum:   Weak IgG Lambda Band Identified    Reference Range: None Detected      [10-15-19 @ 14:35]  SPEP Interpretation: Weak Gamma-Migrating Paraprotein Identified      [10-15-19 @ 14:35]

## 2019-10-16 NOTE — PROGRESS NOTE ADULT - SUBJECTIVE AND OBJECTIVE BOX
CHIEF COMPLAINT:    SUBJECTIVE:     REVIEW OF SYSTEMS:    CONSTITUTIONAL: (  )  weakness,  (  ) fevers or chills  EYES/ENT: (  )visual changes;     NECK: (  ) pain or stiffness  RESPIRATORY:   (  )cough, wheezing, hemoptysis;  (  ) shortness of breath  CARDIOVASCULAR:  (  )chest pain or palpitations  GASTROINTESTINAL:   (  )abdominal or epigastric pain.  (  ) nausea, vomiting, or hematemesis;   (   ) diarrhea or constipation.   GENITOURINARY:   (    ) dysuria, frequency or hematuria  NEUROLOGICAL:  (   ) numbness or weakness   All other review of systems is negative unless indicated above    Vital Signs Last 24 Hrs  T(C): 36.8 (16 Oct 2019 04:09), Max: 37.2 (15 Oct 2019 17:48)  T(F): 98.3 (16 Oct 2019 04:09), Max: 98.9 (15 Oct 2019 17:48)  HR: 80 (16 Oct 2019 04:09) (64 - 80)  BP: 110/71 (16 Oct 2019 04:09) (110/71 - 113/74)  BP(mean): --  RR: 20 (16 Oct 2019 04:09) (20 - 37)  SpO2: 98% (16 Oct 2019 04:09) (95% - 100%)    I&O's Summary    15 Oct 2019 07:01  -  16 Oct 2019 07:00  --------------------------------------------------------  IN: 580 mL / OUT: 1500 mL / NET: -920 mL        CAPILLARY BLOOD GLUCOSE      POCT Blood Glucose.: 354 mg/dL (15 Oct 2019 22:11)  POCT Blood Glucose.: 174 mg/dL (15 Oct 2019 19:02)  POCT Blood Glucose.: 82 mg/dL (15 Oct 2019 18:30)  POCT Blood Glucose.: 93 mg/dL (15 Oct 2019 18:10)  POCT Blood Glucose.: 64 mg/dL (15 Oct 2019 17:56)  POCT Blood Glucose.: 64 mg/dL (15 Oct 2019 17:55)  POCT Blood Glucose.: 307 mg/dL (15 Oct 2019 11:46)      PHYSICAL EXAM:    Constitutional:  (   ) NAD,   (   )awake and alert  HEENT: PERR, EOMI,    Neck: Soft and supple, No LAD, No JVD  Respiratory:  (    Breath sounds are clear bilaterally,    (   ) wheezing, rales or rhonchi  Cardiovascular:     (   )S1 and S2, regular rate and rhythm, no Murmurs, gallops or rubs  Gastrointestinal:  (   )Bowel Sounds present, soft,   (  )nontender, nondistended,    Extremities:    (  ) peripheral edema  Vascular: 2+ peripheral pulses  Neurological:    (    )A/O x 3,   (  ) focal deficits  Musculoskeletal:    (   )  normal strength b/l upper  (     ) normal  lower extremities  Skin: No rashes    MEDICATIONS:  MEDICATIONS  (STANDING):  aspirin enteric coated 81 milliGRAM(s) Oral daily  atorvastatin 80 milliGRAM(s) Oral at bedtime  chlorhexidine 2% Cloths 1 Application(s) Topical <User Schedule>  clopidogrel Tablet 75 milliGRAM(s) Oral daily  dextrose 5%. 1000 milliLiter(s) (50 mL/Hr) IV Continuous <Continuous>  dextrose 50% Injectable 12.5 Gram(s) IV Push once  dextrose 50% Injectable 25 Gram(s) IV Push once  dextrose 50% Injectable 25 Gram(s) IV Push once  docusate sodium 100 milliGRAM(s) Oral two times a day  heparin  Injectable 5000 Unit(s) SubCutaneous every 12 hours  insulin glargine Injectable (LANTUS) 50 Unit(s) SubCutaneous at bedtime  insulin lispro (HumaLOG) corrective regimen sliding scale   SubCutaneous three times a day before meals  insulin lispro (HumaLOG) corrective regimen sliding scale   SubCutaneous at bedtime  insulin lispro Injectable (HumaLOG) 10 Unit(s) SubCutaneous three times a day before meals  levothyroxine 50 MICROGram(s) Oral daily  melatonin 5 milliGRAM(s) Oral at bedtime  metoprolol tartrate 12.5 milliGRAM(s) Oral two times a day  midodrine 10 milliGRAM(s) Oral every 8 hours  pantoprazole    Tablet 40 milliGRAM(s) Oral before breakfast  polyethylene glycol 3350 17 Gram(s) Oral daily  senna 2 Tablet(s) Oral at bedtime      LABS: All Labs Reviewed:                        10.2   10.61 )-----------( 238      ( 16 Oct 2019 06:09 )             32.7     10-16    142  |  105  |  72<H>  ----------------------------<  161<H>  4.1   |  22  |  1.89<H>    Ca    9.3      16 Oct 2019 06:09  Phos  2.4     10-16  Mg     2.5     10-16    TPro  7.5  /  Alb  3.4  /  TBili  0.5  /  DBili  x   /  AST  49<H>  /  ALT  251<H>  /  AlkPhos  101  10-16          Blood Culture:   Urine Culture      RADIOLOGY/EKG:    ASSESSMENT AND PLAN:                        insulin glargine Injectable (LANTUS) 50 Unit(s) SubCutaneous at bedtime  insulin lispro (HumaLOG) corrective regimen sliding scale   SubCutaneous three times a day before meals  insulin lispro (HumaLOG) corrective regimen sliding scale   SubCutaneous at bedtime  insulin lispro Injectable (HumaLOG) 10 Unit(s) SubCutaneous three times a day before meals        DVT PPX:    ADVANCED DIRECTIVE:    DISPOSITION: CHIEF COMPLAINT:  patient was seen today in  the floor  sitting in the bed awake with verbal but seems to be depressed without cough or chest pain or other symptom  patient is 73-year-old female who was admitted secondary to shortness of breath2/2 acute decompensated heart failure  SUBJECTIVE:     REVIEW OF SYSTEMS:    CONSTITUTIONAL: (x  )  weakness,  (  ) fevers or chills  EYES/ENT: (  )visual changes;     NECK: (  ) pain or stiffness  RESPIRATORY:   (  )cough, wheezing, hemoptysis;  (  ) shortness of breath  CARDIOVASCULAR:  (  )chest pain or palpitations  GASTROINTESTINAL:   (  )abdominal or epigastric pain.  (  ) nausea, vomiting, or hematemesis;   (   ) diarrhea or constipation.   GENITOURINARY:   (    ) dysuria, frequency or hematuria  NEUROLOGICAL:  (   ) numbness or weakness   All other review of systems is negative unless indicated above    Vital Signs Last 24 Hrs  T(C): 36.8 (16 Oct 2019 04:09), Max: 37.2 (15 Oct 2019 17:48)  T(F): 98.3 (16 Oct 2019 04:09), Max: 98.9 (15 Oct 2019 17:48)  HR: 80 (16 Oct 2019 04:09) (64 - 80)  BP: 110/71 (16 Oct 2019 04:09) (110/71 - 113/74)  BP(mean): --  RR: 20 (16 Oct 2019 04:09) (20 - 37)  SpO2: 98% (16 Oct 2019 04:09) (95% - 100%)    I&O's Summary    15 Oct 2019 07:01  -  16 Oct 2019 07:00  --------------------------------------------------------  IN: 580 mL / OUT: 1500 mL / NET: -920 mL        CAPILLARY BLOOD GLUCOSE      POCT Blood Glucose.: 354 mg/dL (15 Oct 2019 22:11)  POCT Blood Glucose.: 174 mg/dL (15 Oct 2019 19:02)  POCT Blood Glucose.: 82 mg/dL (15 Oct 2019 18:30)  POCT Blood Glucose.: 93 mg/dL (15 Oct 2019 18:10)  POCT Blood Glucose.: 64 mg/dL (15 Oct 2019 17:56)  POCT Blood Glucose.: 64 mg/dL (15 Oct 2019 17:55)  POCT Blood Glucose.: 307 mg/dL (15 Oct 2019 11:46)      PHYSICAL EXAM:    Constitutional:  ( x  ) NAD,   ( x  )awake and alert ill  appearing woman  HEENT: PERR, EOMI,    Neck: Soft and supple, No LAD, No JVD  Respiratory:  (  x  Breath sounds are clear bilaterally,    (   ) wheezing, rales or rhonchi  Cardiovascular:     (x   )S1 and S2, regular rate and rhythm, no Murmurs, gallops or rubs  Gastrointestinal:  ( x  )Bowel Sounds present, soft,   (  )nontender, nondistended,    Extremities:    (  ) peripheral edema  Vascular: 2+ peripheral pulses  Neurological:    ( x   )A/O x 3,   (  ) focal deficits  Musculoskeletal:    (   )  normal strength b/l upper  (     ) normal  lower extremities  Skin: No rashes          MEDICATIONS:  MEDICATIONS  (STANDING):  aspirin enteric coated 81 milliGRAM(s) Oral daily  atorvastatin 80 milliGRAM(s) Oral at bedtime  chlorhexidine 2% Cloths 1 Application(s) Topical <User Schedule>  clopidogrel Tablet 75 milliGRAM(s) Oral daily  dextrose 5%. 1000 milliLiter(s) (50 mL/Hr) IV Continuous <Continuous>  dextrose 50% Injectable 12.5 Gram(s) IV Push once  dextrose 50% Injectable 25 Gram(s) IV Push once  dextrose 50% Injectable 25 Gram(s) IV Push once  docusate sodium 100 milliGRAM(s) Oral two times a day  heparin  Injectable 5000 Unit(s) SubCutaneous every 12 hours  insulin glargine Injectable (LANTUS) 50 Unit(s) SubCutaneous at bedtime  insulin lispro (HumaLOG) corrective regimen sliding scale   SubCutaneous three times a day before meals  insulin lispro (HumaLOG) corrective regimen sliding scale   SubCutaneous at bedtime  insulin lispro Injectable (HumaLOG) 10 Unit(s) SubCutaneous three times a day before meals  levothyroxine 50 MICROGram(s) Oral daily  melatonin 5 milliGRAM(s) Oral at bedtime  metoprolol tartrate 12.5 milliGRAM(s) Oral two times a day  midodrine 10 milliGRAM(s) Oral every 8 hours  pantoprazole    Tablet 40 milliGRAM(s) Oral before breakfast  polyethylene glycol 3350 17 Gram(s) Oral daily  senna 2 Tablet(s) Oral at bedtime      LABS: All Labs Reviewed:                        10.2   10.61 )-----------( 238      ( 16 Oct 2019 06:09 )             32.7     10-16    142  |  105  |  72<H>  ----------------------------<  161<H>  4.1   |  22  |  1.89<H>    Ca    9.3      16 Oct 2019 06:09  Phos  2.4     10-16  Mg     2.5     10-16    TPro  7.5  /  Alb  3.4  /  TBili  0.5  /  DBili  x   /  AST  49<H>  /  ALT  251<H>  /  AlkPhos  101  10-16          Blood Culture:   Urine Culture      RADIOLOGY/EKG:    ASSESSMENT AND PLAN:    74 y/o F w/ PMH of HTN, HLD, DM2, GERD, CAD s/p CABG (LIMA to LAD, SVG to OM, SVG to PDA; 2014), severe MR s/p MitraClip, severe pHTN, CKD4, hypoTH c/o dyspnea x 2 days suspect 2/2  NSTEMI +/- ADHF w/ e/o organ hypoperfusion.    #cardiovascular  NSTEMI, acute pulmonary edema, elevated lactic acid levels.  Elevated biomarkers and transaminitis.  Paroxysmal SVT noted.  RHC and IABP insertion. IIABP weaned yesterday. Patient now hypotensive requiring pressor support.       #Neuro - No active issues  #Resp - Off supplemental O2, sating well on RA. Vol optimize, as above.  #GI - Heart healthy diet. Trend liver tests, downtrending   #Endo -  Her blood sugar elevated today  off   IV insulin 4 cc/h     currently she is on was changed secondary to hypoglycemia      (LANTUS) 50 Unit(s) SubCutaneous at bedtime  insulin lispro (HumaLOG) corrective regimen sliding scale   SubCutaneous three times a day before meals  insulin lispro (HumaLOG) corrective regimen sliding scale   SubCutaneous at bedtime  insulin lispro Injectable (HumaLOG) 10 Unit(s) SubCutaneous three times a day before meals    #Renal - Vol optimize, as above. MERVIN likely 2/2 acute congestion. continue to monitor creatinine.    #ID - Leukocytosis. Sepsis w/u unremarkable thus far- BCx and UCx NGTD; c/w Cefepime given MERVIN, to complete 7 day course on 10/14  #Hem - HSQ for DVT PPX. iron studies. will transfuse 1 unit PRBCS           DVT PPX:    ADVANCED DIRECTIVE:    DISPOSITION: family desired to go home they refused subacute rehab CHIEF COMPLAINT:  patient was seen today in  the floor  sitting in the bed awake with verbal but seems to be depressed without cough or chest pain or other symptom  patient is 73-year-old female who was admitted secondary to shortness of breath2/2 acute decompensated heart failure  SUBJECTIVE:     REVIEW OF SYSTEMS:    CONSTITUTIONAL: (x  )  weakness,  (  ) fevers or chills  EYES/ENT: (  )visual changes;     NECK: (  ) pain or stiffness  RESPIRATORY:   (  )cough, wheezing, hemoptysis;  (  ) shortness of breath  CARDIOVASCULAR:  (  )chest pain or palpitations  GASTROINTESTINAL:   (  )abdominal or epigastric pain.  (  ) nausea, vomiting, or hematemesis;   (   ) diarrhea or constipation.   GENITOURINARY:   (    ) dysuria, frequency or hematuria  NEUROLOGICAL:  (   ) numbness or weakness   All other review of systems is negative unless indicated above    Vital Signs Last 24 Hrs  T(C): 36.8 (16 Oct 2019 04:09), Max: 37.2 (15 Oct 2019 17:48)  T(F): 98.3 (16 Oct 2019 04:09), Max: 98.9 (15 Oct 2019 17:48)  HR: 80 (16 Oct 2019 04:09) (64 - 80)  BP: 110/71 (16 Oct 2019 04:09) (110/71 - 113/74)  BP(mean): --  RR: 20 (16 Oct 2019 04:09) (20 - 37)  SpO2: 98% (16 Oct 2019 04:09) (95% - 100%)    I&O's Summary    15 Oct 2019 07:01  -  16 Oct 2019 07:00  --------------------------------------------------------  IN: 580 mL / OUT: 1500 mL / NET: -920 mL        CAPILLARY BLOOD GLUCOSE      POCT Blood Glucose.: 354 mg/dL (15 Oct 2019 22:11)  POCT Blood Glucose.: 174 mg/dL (15 Oct 2019 19:02)  POCT Blood Glucose.: 82 mg/dL (15 Oct 2019 18:30)  POCT Blood Glucose.: 93 mg/dL (15 Oct 2019 18:10)  POCT Blood Glucose.: 64 mg/dL (15 Oct 2019 17:56)  POCT Blood Glucose.: 64 mg/dL (15 Oct 2019 17:55)  POCT Blood Glucose.: 307 mg/dL (15 Oct 2019 11:46)      PHYSICAL EXAM:    Constitutional:  ( x  ) NAD,   ( x  )awake and alert ill  appearing woman  HEENT: PERR, EOMI,    Neck: Soft and supple, No LAD, No JVD  Respiratory:  (  x  Breath sounds are clear bilaterally,    (   ) wheezing, rales or rhonchi  Cardiovascular:     (x   )S1 and S2, regular rate and rhythm, no Murmurs, gallops or rubs  Gastrointestinal:  ( x  )Bowel Sounds present, soft,   (  )nontender, nondistended,    Extremities:    (  ) peripheral edema  Vascular: 2+ peripheral pulses  Neurological:    ( x   )A/O x 3,   (  ) focal deficits  Musculoskeletal:    (   )  normal strength b/l upper  (     ) normal  lower extremities  Skin: No rashes          MEDICATIONS:  MEDICATIONS  (STANDING):  aspirin enteric coated 81 milliGRAM(s) Oral daily  atorvastatin 80 milliGRAM(s) Oral at bedtime  chlorhexidine 2% Cloths 1 Application(s) Topical <User Schedule>  clopidogrel Tablet 75 milliGRAM(s) Oral daily  dextrose 5%. 1000 milliLiter(s) (50 mL/Hr) IV Continuous <Continuous>  dextrose 50% Injectable 12.5 Gram(s) IV Push once  dextrose 50% Injectable 25 Gram(s) IV Push once  dextrose 50% Injectable 25 Gram(s) IV Push once  docusate sodium 100 milliGRAM(s) Oral two times a day  heparin  Injectable 5000 Unit(s) SubCutaneous every 12 hours  insulin glargine Injectable (LANTUS) 50 Unit(s) SubCutaneous at bedtime  insulin lispro (HumaLOG) corrective regimen sliding scale   SubCutaneous three times a day before meals  insulin lispro (HumaLOG) corrective regimen sliding scale   SubCutaneous at bedtime  insulin lispro Injectable (HumaLOG) 10 Unit(s) SubCutaneous three times a day before meals  levothyroxine 50 MICROGram(s) Oral daily  melatonin 5 milliGRAM(s) Oral at bedtime  metoprolol tartrate 12.5 milliGRAM(s) Oral two times a day  midodrine 10 milliGRAM(s) Oral every 8 hours  pantoprazole    Tablet 40 milliGRAM(s) Oral before breakfast  polyethylene glycol 3350 17 Gram(s) Oral daily  senna 2 Tablet(s) Oral at bedtime      LABS: All Labs Reviewed:                        10.2   10.61 )-----------( 238      ( 16 Oct 2019 06:09 )             32.7     10-16    142  |  105  |  72<H>  ----------------------------<  161<H>  4.1   |  22  |  1.89<H>    Ca    9.3      16 Oct 2019 06:09  Phos  2.4     10-16  Mg     2.5     10-16    TPro  7.5  /  Alb  3.4  /  TBili  0.5  /  DBili  x   /  AST  49<H>  /  ALT  251<H>  /  AlkPhos  101  10-16          Blood Culture:   Urine Culture      RADIOLOGY/EKG:    ASSESSMENT AND PLAN:    72 y/o F w/ PMH of HTN, HLD, DM2, GERD, CAD s/p CABG (LIMA to LAD, SVG to OM, SVG to PDA; 2014), severe MR s/p MitraClip, severe pHTN, CKD4, hypoTH c/o dyspnea x 2 days suspect 2/2  NSTEMI +/- ADHF w/ e/o organ hypoperfusion.    #cardiovascular  NSTEMI, acute pulmonary edema, elevated lactic acid levels.  Elevated biomarkers and transaminitis.  Paroxysmal SVT noted.  RHC and IABP insertion.  patient may benefit from left heart catheterization as per cardiology they will decide if she is a good candidate discussed with CCU team before transfer to the floor       #Neuro - No active issues  #Resp - Off supplemental O2, sating well on RA. Vol optimize, as above.  #GI - Heart healthy diet. Trend liver tests, downtrending   #Endo -  Her blood sugar elevated today  off   IV insulin 4 cc/h     currently she is on was changed secondary to hypoglycemia      (LANTUS) 50 Unit(s) SubCutaneous at bedtime  insulin lispro (HumaLOG) corrective regimen sliding scale   SubCutaneous three times a day before meals  insulin lispro (HumaLOG) corrective regimen sliding scale   SubCutaneous at bedtime  insulin lispro Injectable (HumaLOG) 10 Unit(s) SubCutaneous three times a day before meals    #Renal - Vol optimize, as above. MERVIN likely 2/2 acute congestion. continue to monitor creatinine.    #ID - Leukocytosis. Sepsis w/u unremarkable thus far- BCx and UCx NGTD; c/w Cefepime given MERVIN, to complete 7 day course on 10/14  #Hem - HSQ for DVT PPX. iron studies. will transfuse 1 unit PRBCS           DVT PPX:    ADVANCED DIRECTIVE:    DISPOSITION: family desired to go home they refused subacute rehab

## 2019-10-16 NOTE — PROGRESS NOTE ADULT - ASSESSMENT
74 yo F w/ PMH of CKD stage 4 (baseline Cr 1.9-2.2) HTN, T2DM, CAD s/p CABG X3 (2014 at [facility]), non-dilated ICM (EF 20-25%), severe mitral regurgitation s/p mitral clip (6/13/19), severe pulm HTN, hypothyroidism who presented for evaluation of chest pain, cough, and SOB for the past 2 days and was admitted for ADHF. s/p IABP 10/8/19, removed 10/12/19. Currently medically managing heart failure, no plans for cath at this time. Nephrology is following for MERVIN    MERVIN on CKD stage 4  - baseline Cr 1.9-2.2; has had recurrent MERVIN's over the years  - CKD is likely 2/2 recurrent MERVIN's + underlying DM/HTN  - MERVIN is likely 2/2 CRS; now back to baseline Cr      - renal sonogram w/ parenchymal changes, no hydro, no stones, left simple renal cyst  - Avoid nephrotoxins including NSAIDs, IV contrast (if possible), [employer] products  - maintain MAP >65, currently on vasopressin drip  - monitor I/O's accurately    - no plans for cath per notes, please call/text to notify if cardiac cath is to be performed at any time    Metabolic acidosis  - 2/2 lactic acidosis 2/2 hypoperfusion/hypotension/dec cardiac output  - lactic acidosis resolved  - monitor    Hypotension  - likely from dec cardiac output and hypoperfusion  - hold anti-hypertensives  - beta blocker per cardio recs (needed for AVNRT)  - continue midodrine to maintain MAP >65    Proteinuria/Hematuria  - likely from underlying DM  - Hep B, Hep C, HIV RF neg  - check spot urine protein/Cr, syphilis, SPEP, UPEP, serum immunofixation, ANCA, C3 and C4 complement levels, DEAN, cryoglobulin (ordered for today, results pending)

## 2019-10-17 ENCOUNTER — TRANSCRIPTION ENCOUNTER (OUTPATIENT)
Age: 74
End: 2019-10-17

## 2019-10-17 LAB
% GAMMA, URINE: 11.3 % — SIGNIFICANT CHANGE UP
ALBUMIN 24H MFR UR ELPH: 32.5 % — SIGNIFICANT CHANGE UP
ALPHA1 GLOB 24H MFR UR ELPH: 26.9 % — SIGNIFICANT CHANGE UP
ALPHA2 GLOB 24H MFR UR ELPH: 12.8 % — SIGNIFICANT CHANGE UP
ANION GAP SERPL CALC-SCNC: 14 MMOL/L — SIGNIFICANT CHANGE UP (ref 5–17)
AUTO DIFF PNL BLD: ABNORMAL
B-GLOBULIN 24H MFR UR ELPH: 16.5 % — SIGNIFICANT CHANGE UP
BUN SERPL-MCNC: 55 MG/DL — HIGH (ref 7–23)
C-ANCA SER-ACNC: NEGATIVE — SIGNIFICANT CHANGE UP
CALCIUM SERPL-MCNC: 9 MG/DL — SIGNIFICANT CHANGE UP (ref 8.4–10.5)
CHLORIDE SERPL-SCNC: 103 MMOL/L — SIGNIFICANT CHANGE UP (ref 96–108)
CO2 SERPL-SCNC: 21 MMOL/L — LOW (ref 22–31)
COLLECT DURATION TIME UR: 24 HR — SIGNIFICANT CHANGE UP
CREAT SERPL-MCNC: 1.83 MG/DL — HIGH (ref 0.5–1.3)
GLUCOSE BLDC GLUCOMTR-MCNC: 105 MG/DL — HIGH (ref 70–99)
GLUCOSE BLDC GLUCOMTR-MCNC: 195 MG/DL — HIGH (ref 70–99)
GLUCOSE BLDC GLUCOMTR-MCNC: 283 MG/DL — HIGH (ref 70–99)
GLUCOSE BLDC GLUCOMTR-MCNC: 370 MG/DL — HIGH (ref 70–99)
GLUCOSE SERPL-MCNC: 133 MG/DL — HIGH (ref 70–99)
HCT VFR BLD CALC: 31.8 % — LOW (ref 34.5–45)
HGB BLD-MCNC: 10 G/DL — LOW (ref 11.5–15.5)
INTERPRETATION 24H UR IFE-IMP: SIGNIFICANT CHANGE UP
INTERPRETATION 24H UR IFE-IMP: SIGNIFICANT CHANGE UP
M PROTEIN 24H UR ELPH-MRATE: 0 MG/24HR — SIGNIFICANT CHANGE UP (ref 0–0)
M PROTEIN 24H UR ELPH-MRATE: 0 MG/DL — SIGNIFICANT CHANGE UP
MCHC RBC-ENTMCNC: 27.2 PG — SIGNIFICANT CHANGE UP (ref 27–34)
MCHC RBC-ENTMCNC: 31.4 GM/DL — LOW (ref 32–36)
MCV RBC AUTO: 86.4 FL — SIGNIFICANT CHANGE UP (ref 80–100)
NRBC # BLD: 0 /100 WBCS — SIGNIFICANT CHANGE UP (ref 0–0)
P-ANCA SER-ACNC: NEGATIVE — SIGNIFICANT CHANGE UP
PLATELET # BLD AUTO: 241 K/UL — SIGNIFICANT CHANGE UP (ref 150–400)
POTASSIUM SERPL-MCNC: 3.9 MMOL/L — SIGNIFICANT CHANGE UP (ref 3.5–5.3)
POTASSIUM SERPL-SCNC: 3.9 MMOL/L — SIGNIFICANT CHANGE UP (ref 3.5–5.3)
PROT ?TM UR-MCNC: 72 MG/DL — HIGH (ref 0–12)
PROT PATTERN 24H UR ELPH-IMP: SIGNIFICANT CHANGE UP
PROTEIN QUANT CALC, URINE: 648 MG/24 H — HIGH (ref 50–100)
RBC # BLD: 3.68 M/UL — LOW (ref 3.8–5.2)
RBC # FLD: 17.9 % — HIGH (ref 10.3–14.5)
SODIUM SERPL-SCNC: 138 MMOL/L — SIGNIFICANT CHANGE UP (ref 135–145)
TOTAL VOLUME - 24 HOUR: 900 ML — SIGNIFICANT CHANGE UP
URINE CREATININE CALCULATION: 0.4 G/24 H — LOW (ref 0.8–1.8)
WBC # BLD: 9.68 K/UL — SIGNIFICANT CHANGE UP (ref 3.8–10.5)
WBC # FLD AUTO: 9.68 K/UL — SIGNIFICANT CHANGE UP (ref 3.8–10.5)

## 2019-10-17 RX ORDER — ALPRAZOLAM 0.25 MG
1 TABLET ORAL
Qty: 0 | Refills: 0 | DISCHARGE
Start: 2019-10-17

## 2019-10-17 RX ORDER — ATORVASTATIN CALCIUM 80 MG/1
1 TABLET, FILM COATED ORAL
Qty: 0 | Refills: 0 | DISCHARGE
Start: 2019-10-17

## 2019-10-17 RX ORDER — MIDODRINE HYDROCHLORIDE 2.5 MG/1
1 TABLET ORAL
Qty: 0 | Refills: 0 | DISCHARGE
Start: 2019-10-17

## 2019-10-17 RX ORDER — METOPROLOL TARTRATE 50 MG
12.5 TABLET ORAL
Qty: 0 | Refills: 0 | DISCHARGE
Start: 2019-10-17

## 2019-10-17 RX ORDER — CLOPIDOGREL BISULFATE 75 MG/1
1 TABLET, FILM COATED ORAL
Qty: 0 | Refills: 0 | DISCHARGE
Start: 2019-10-17

## 2019-10-17 RX ORDER — POLYETHYLENE GLYCOL 3350 17 G/17G
17 POWDER, FOR SOLUTION ORAL
Qty: 0 | Refills: 0 | DISCHARGE
Start: 2019-10-17

## 2019-10-17 RX ADMIN — Medication 10 UNIT(S): at 17:59

## 2019-10-17 RX ADMIN — Medication 50 MICROGRAM(S): at 05:09

## 2019-10-17 RX ADMIN — Medication 12.5 MILLIGRAM(S): at 17:07

## 2019-10-17 RX ADMIN — Medication 12.5 MILLIGRAM(S): at 05:09

## 2019-10-17 RX ADMIN — Medication 100 MILLIGRAM(S): at 05:08

## 2019-10-17 RX ADMIN — MIDODRINE HYDROCHLORIDE 10 MILLIGRAM(S): 2.5 TABLET ORAL at 21:28

## 2019-10-17 RX ADMIN — PANTOPRAZOLE SODIUM 40 MILLIGRAM(S): 20 TABLET, DELAYED RELEASE ORAL at 05:09

## 2019-10-17 RX ADMIN — Medication 0.25 MILLIGRAM(S): at 19:54

## 2019-10-17 RX ADMIN — Medication 100 MILLIGRAM(S): at 17:07

## 2019-10-17 RX ADMIN — Medication 81 MILLIGRAM(S): at 11:34

## 2019-10-17 RX ADMIN — CLOPIDOGREL BISULFATE 75 MILLIGRAM(S): 75 TABLET, FILM COATED ORAL at 11:34

## 2019-10-17 RX ADMIN — HEPARIN SODIUM 5000 UNIT(S): 5000 INJECTION INTRAVENOUS; SUBCUTANEOUS at 17:06

## 2019-10-17 RX ADMIN — Medication 5 MILLIGRAM(S): at 21:28

## 2019-10-17 RX ADMIN — MIDODRINE HYDROCHLORIDE 10 MILLIGRAM(S): 2.5 TABLET ORAL at 13:18

## 2019-10-17 RX ADMIN — Medication 10 UNIT(S): at 09:10

## 2019-10-17 RX ADMIN — Medication 3: at 17:58

## 2019-10-17 RX ADMIN — Medication 1: at 13:17

## 2019-10-17 RX ADMIN — MIDODRINE HYDROCHLORIDE 10 MILLIGRAM(S): 2.5 TABLET ORAL at 05:09

## 2019-10-17 RX ADMIN — ATORVASTATIN CALCIUM 80 MILLIGRAM(S): 80 TABLET, FILM COATED ORAL at 21:28

## 2019-10-17 RX ADMIN — HEPARIN SODIUM 5000 UNIT(S): 5000 INJECTION INTRAVENOUS; SUBCUTANEOUS at 05:09

## 2019-10-17 RX ADMIN — CHLORHEXIDINE GLUCONATE 1 APPLICATION(S): 213 SOLUTION TOPICAL at 09:08

## 2019-10-17 RX ADMIN — POLYETHYLENE GLYCOL 3350 17 GRAM(S): 17 POWDER, FOR SOLUTION ORAL at 11:34

## 2019-10-17 RX ADMIN — Medication 3: at 21:27

## 2019-10-17 RX ADMIN — INSULIN GLARGINE 50 UNIT(S): 100 INJECTION, SOLUTION SUBCUTANEOUS at 21:27

## 2019-10-17 NOTE — DISCHARGE NOTE NURSING/CASE MANAGEMENT/SOCIAL WORK - NSDCFUADDAPPT_GEN_ALL_CORE_FT
[facility] will call you the day after discharge to set up an appointment.

## 2019-10-17 NOTE — PROGRESS NOTE ADULT - ASSESSMENT
Patient previously seen by Dr. Adame/[facility]. Please call them for consultation first, if they are no longer following the patient, please call General Cardiology.

## 2019-10-17 NOTE — PROGRESS NOTE ADULT - SUBJECTIVE AND OBJECTIVE BOX
Chief complaint  Patient is a 73y old  Female who presents with a chief complaint of Acute decompensated heart failure, SOB (17 Oct 2019 08:43)   Review of systems  Patient in bed, looks comfortable, no fever, no hypoglycemia.    Labs and Fingersticks  CAPILLARY BLOOD GLUCOSE      POCT Blood Glucose.: 105 mg/dL (17 Oct 2019 08:59)  POCT Blood Glucose.: 289 mg/dL (16 Oct 2019 21:54)  POCT Blood Glucose.: 206 mg/dL (16 Oct 2019 17:29)  POCT Blood Glucose.: 172 mg/dL (16 Oct 2019 13:15)      Anion Gap, Serum: 14 (10-17 @ 06:22)  Anion Gap, Serum: 15 (10-16 @ 06:09)      Calcium, Total Serum: 9.0 (10-17 @ 06:22)  Calcium, Total Serum: 9.3 (10-16 @ 06:09)  Albumin, Serum: 3.4 (10-16 @ 06:09)  Albumin, Serum: 3.4 <L> (10-15 @ 14:35)    Alanine Aminotransferase (ALT/SGPT): 251 <H> (10-16 @ 06:09)  Alkaline Phosphatase, Serum: 101 (10-16 @ 06:09)  Aspartate Aminotransferase (AST/SGOT): 49 <H> (10-16 @ 06:09)        10-17    138  |  103  |  55<H>  ----------------------------<  133<H>  3.9   |  21<L>  |  1.83<H>    Ca    9.0      17 Oct 2019 06:22  Phos  2.4     10-16  Mg     2.5     10-16    TPro  7.5  /  Alb  3.4  /  TBili  0.5  /  DBili  x   /  AST  49<H>  /  ALT  251<H>  /  AlkPhos  101  10-16                        10.0   9.68  )-----------( 241      ( 17 Oct 2019 06:22 )             31.8     Medications  MEDICATIONS  (STANDING):  aspirin enteric coated 81 milliGRAM(s) Oral daily  atorvastatin 80 milliGRAM(s) Oral at bedtime  chlorhexidine 2% Cloths 1 Application(s) Topical <User Schedule>  clopidogrel Tablet 75 milliGRAM(s) Oral daily  dextrose 5%. 1000 milliLiter(s) (50 mL/Hr) IV Continuous <Continuous>  dextrose 50% Injectable 12.5 Gram(s) IV Push once  dextrose 50% Injectable 25 Gram(s) IV Push once  dextrose 50% Injectable 25 Gram(s) IV Push once  docusate sodium 100 milliGRAM(s) Oral two times a day  heparin  Injectable 5000 Unit(s) SubCutaneous every 12 hours  insulin glargine Injectable (LANTUS) 50 Unit(s) SubCutaneous at bedtime  insulin lispro (HumaLOG) corrective regimen sliding scale   SubCutaneous three times a day before meals  insulin lispro (HumaLOG) corrective regimen sliding scale   SubCutaneous at bedtime  insulin lispro Injectable (HumaLOG) 10 Unit(s) SubCutaneous three times a day before meals  levothyroxine 50 MICROGram(s) Oral daily  melatonin 5 milliGRAM(s) Oral at bedtime  metoprolol tartrate 12.5 milliGRAM(s) Oral two times a day  midodrine 10 milliGRAM(s) Oral every 8 hours  pantoprazole    Tablet 40 milliGRAM(s) Oral before breakfast  polyethylene glycol 3350 17 Gram(s) Oral daily  senna 2 Tablet(s) Oral at bedtime      Physical Exam  General: Patient comfortable in bed  Vital Signs Last 12 Hrs  T(F): 98.1 (10-17-19 @ 04:57), Max: 98.1 (10-17-19 @ 04:57)  HR: 75 (10-17-19 @ 04:57) (75 - 75)  BP: 113/68 (10-17-19 @ 04:57) (113/68 - 113/68)  BP(mean): --  RR: 18 (10-17-19 @ 04:57) (18 - 18)  SpO2: 98% (10-17-19 @ 04:57) (98% - 98%)  Neck: No palpable thyroid nodules.  CVS: S1S2, No murmurs  Respiratory: No wheezing, no crepitations  GI: Abdomen soft, bowel sounds positive  Musculoskeletal:  edema lower extremities.   Skin: No skin rashes, no ecchymosis    Diagnostics Chief complaint  Patient is a 73y old  Female who presents with a chief complaint of Acute decompensated heart failure, SOB (17 Oct 2019 08:43)   Review of systems  Patient in bed, looks comfortable, no fever,  no hypoglycemia.    Labs and Fingersticks  CAPILLARY BLOOD GLUCOSE      POCT Blood Glucose.: 105 mg/dL (17 Oct 2019 08:59)  POCT Blood Glucose.: 289 mg/dL (16 Oct 2019 21:54)  POCT Blood Glucose.: 206 mg/dL (16 Oct 2019 17:29)  POCT Blood Glucose.: 172 mg/dL (16 Oct 2019 13:15)      Anion Gap, Serum: 14 (10-17 @ 06:22)  Anion Gap, Serum: 15 (10-16 @ 06:09)      Calcium, Total Serum: 9.0 (10-17 @ 06:22)  Calcium, Total Serum: 9.3 (10-16 @ 06:09)  Albumin, Serum: 3.4 (10-16 @ 06:09)  Albumin, Serum: 3.4 <L> (10-15 @ 14:35)    Alanine Aminotransferase (ALT/SGPT): 251 <H> (10-16 @ 06:09)  Alkaline Phosphatase, Serum: 101 (10-16 @ 06:09)  Aspartate Aminotransferase (AST/SGOT): 49 <H> (10-16 @ 06:09)        10-17    138  |  103  |  55<H>  ----------------------------<  133<H>  3.9   |  21<L>  |  1.83<H>    Ca    9.0      17 Oct 2019 06:22  Phos  2.4     10-16  Mg     2.5     10-16    TPro  7.5  /  Alb  3.4  /  TBili  0.5  /  DBili  x   /  AST  49<H>  /  ALT  251<H>  /  AlkPhos  101  10-16                        10.0   9.68  )-----------( 241      ( 17 Oct 2019 06:22 )             31.8     Medications  MEDICATIONS  (STANDING):  aspirin enteric coated 81 milliGRAM(s) Oral daily  atorvastatin 80 milliGRAM(s) Oral at bedtime  chlorhexidine 2% Cloths 1 Application(s) Topical <User Schedule>  clopidogrel Tablet 75 milliGRAM(s) Oral daily  dextrose 5%. 1000 milliLiter(s) (50 mL/Hr) IV Continuous <Continuous>  dextrose 50% Injectable 12.5 Gram(s) IV Push once  dextrose 50% Injectable 25 Gram(s) IV Push once  dextrose 50% Injectable 25 Gram(s) IV Push once  docusate sodium 100 milliGRAM(s) Oral two times a day  heparin  Injectable 5000 Unit(s) SubCutaneous every 12 hours  insulin glargine Injectable (LANTUS) 50 Unit(s) SubCutaneous at bedtime  insulin lispro (HumaLOG) corrective regimen sliding scale   SubCutaneous three times a day before meals  insulin lispro (HumaLOG) corrective regimen sliding scale   SubCutaneous at bedtime  insulin lispro Injectable (HumaLOG) 10 Unit(s) SubCutaneous three times a day before meals  levothyroxine 50 MICROGram(s) Oral daily  melatonin 5 milliGRAM(s) Oral at bedtime  metoprolol tartrate 12.5 milliGRAM(s) Oral two times a day  midodrine 10 milliGRAM(s) Oral every 8 hours  pantoprazole    Tablet 40 milliGRAM(s) Oral before breakfast  polyethylene glycol 3350 17 Gram(s) Oral daily  senna 2 Tablet(s) Oral at bedtime      Physical Exam  General: Patient comfortable in bed  Vital Signs Last 12 Hrs  T(F): 98.1 (10-17-19 @ 04:57), Max: 98.1 (10-17-19 @ 04:57)  HR: 75 (10-17-19 @ 04:57) (75 - 75)  BP: 113/68 (10-17-19 @ 04:57) (113/68 - 113/68)  BP(mean): --  RR: 18 (10-17-19 @ 04:57) (18 - 18)  SpO2: 98% (10-17-19 @ 04:57) (98% - 98%)  Neck: No palpable thyroid nodules.  CVS: S1S2, No murmurs  Respiratory: No wheezing, no crepitations  GI: Abdomen soft, bowel sounds positive  Musculoskeletal:  edema lower extremities.   Skin: No skin rashes, no ecchymosis    Diagnostics

## 2019-10-17 NOTE — PROGRESS NOTE ADULT - ASSESSMENT
Assessment  DMT2: 73y Female with DM T2 with hyperglycemia, A1C 7.5%, on basal bolus insulin, decreased dose yesterday, blood sugars fluctuating, now trending within acceptable range, transferred to medical floor, she is eating full meals, looks better today, comfortable.  SOB: on meds, FU Heart Failure team.  CAD: S/P CABG previous admission, on medications, no chest pain, stable, monitored.  Hypothyroidism: On Synthroid 50 mcg po daily, compliant with Synthroid intake, asymptomatic.  HTN: Controlled,  on antihypertensive medications.  CKD: Monitor labs/BMP,           Jillian Banks MD  Cell: [phone]  Office: [phone] Assessment  DMT2: 73y Female with DM T2 with hyperglycemia, A1C 7.5%, on basal bolus insulin, decreased dose yesterday,  blood sugars fluctuating, now trending within acceptable range, transferred to medical floor, she is eating full meals, looks better today, comfortable.  SOB: on meds, FU Heart Failure team.  CAD: S/P CABG previous admission, on medications, no chest pain, stable, monitored.  Hypothyroidism: On Synthroid 50 mcg po daily, compliant with Synthroid intake, asymptomatic.  HTN: Controlled,  on antihypertensive medications.  CKD: Monitor labs/BMP,           Jillian Banks MD  Cell: [phone]  Office: [phone]

## 2019-10-17 NOTE — PROGRESS NOTE ADULT - ASSESSMENT
72 yo F w/ PMH of CKD stage 4 (baseline Cr 1.9-2.2) HTN, T2DM, CAD s/p CABG X3 (2014 at [facility]), non-dilated ICM (EF 20-25%), severe mitral regurgitation s/p mitral clip (6/13/19), severe pulm HTN, hypothyroidism who presented for evaluation of chest pain, cough, and SOB for the past 2 days and was admitted for ADHF. s/p IABP 10/8/19, removed 10/12/19. Currently medically managing heart failure, no plans for cath at this time. Nephrology is following for MERVIN    MERVIN on CKD stage 4  - baseline Cr 1.9-2.2; has had recurrent MERVIN's over the years  - CKD is likely 2/2 recurrent MERVIN's + underlying DM/HTN  - MERVIN is likely 2/2 CRS; now back to baseline Cr      - renal sonogram w/ parenchymal changes, no hydro, no stones, left simple renal cyst  - Avoid nephrotoxins including NSAIDs, IV contrast (if possible), [employer] products  - monitor I/O's accurately    - recommend increasing midodrine dose to maintain MAP >65    - no plans for cath per notes, please call/text to notify if cardiac cath is to be performed at any time    Metabolic acidosis  - 2/2 lactic acidosis 2/2 hypoperfusion/hypotension/dec cardiac output  - lactic acidosis resolved  - monitor    Hypotension  - likely from dec cardiac output and hypoperfusion  - hold anti-hypertensives  - beta blocker per cardio recs (needed for AVNRT)    - recommend increasing midodrine dose to maintain MAP >65    Proteinuria/Hematuria  - likely from underlying DM  - has 1.8 grams proteinuria  - Hep B, Hep C, HIV RF, C3, C4, p-ANCA, c-ANCA, RPR neg  - weak gamma-migrating protein, weak IgG lambda protein band noted  - DEAN 1:320 positive  - RPR neg, FTA +   - recommend repeat DEAN w/ reflex Ab panel, check dsDNA  - recommend hematology eval for abnormal SPEP/IF

## 2019-10-17 NOTE — DISCHARGE NOTE NURSING/CASE MANAGEMENT/SOCIAL WORK - PATIENT PORTAL LINK FT
You can access the FollowMyHealth Patient Portal offered by [facility] by registering at the following website: [email or web address]. By joining [employer]’s FollowMyHealth portal, you will also be able to view your health information using other applications (apps) compatible with our system.

## 2019-10-17 NOTE — PROGRESS NOTE ADULT - SUBJECTIVE AND OBJECTIVE BOX
[facility]   MD Vanita Dasilva D.O. Fatima Sheikh, D.O. Ruoro Wong, PA    From 7 AM - 5 PM:  OFFICE: [phone]  Dr. Muhammad cell: [phone]  Dr. Bowers cell: [phone]  Dr. Soria cell: [phone]  RASHIDA Smith cell: [phone]    From 5 PM - 7 AM: Answering Service: [phone]        RENAL FOLLOW UP NOTE  --------------------------------------------------------------------------------  HPI: Pt seen and examined. Reports she feels weak today. Has no other complaints. Denies any symptoms.        PAST HISTORY  --------------------------------------------------------------------------------  No significant changes to PMH, PSH, FHx, SHx, unless otherwise noted    ALLERGIES & MEDICATIONS  --------------------------------------------------------------------------------  Allergies    azithromycin (Hives; Pruritus)    Intolerances      Standing Inpatient Medications  aspirin enteric coated 81 milliGRAM(s) Oral daily  atorvastatin 80 milliGRAM(s) Oral at bedtime  chlorhexidine 2% Cloths 1 Application(s) Topical <User Schedule>  clopidogrel Tablet 75 milliGRAM(s) Oral daily  dextrose 5%. 1000 milliLiter(s) IV Continuous <Continuous>  dextrose 50% Injectable 12.5 Gram(s) IV Push once  dextrose 50% Injectable 25 Gram(s) IV Push once  dextrose 50% Injectable 25 Gram(s) IV Push once  docusate sodium 100 milliGRAM(s) Oral two times a day  heparin  Injectable 5000 Unit(s) SubCutaneous every 12 hours  insulin glargine Injectable (LANTUS) 50 Unit(s) SubCutaneous at bedtime  insulin lispro (HumaLOG) corrective regimen sliding scale   SubCutaneous three times a day before meals  insulin lispro (HumaLOG) corrective regimen sliding scale   SubCutaneous at bedtime  insulin lispro Injectable (HumaLOG) 10 Unit(s) SubCutaneous three times a day before meals  levothyroxine 50 MICROGram(s) Oral daily  melatonin 5 milliGRAM(s) Oral at bedtime  metoprolol tartrate 12.5 milliGRAM(s) Oral two times a day  midodrine 10 milliGRAM(s) Oral every 8 hours  pantoprazole    Tablet 40 milliGRAM(s) Oral before breakfast  polyethylene glycol 3350 17 Gram(s) Oral daily  senna 2 Tablet(s) Oral at bedtime    PRN Inpatient Medications  ALPRAZolam 0.25 milliGRAM(s) Oral three times a day PRN  dextrose 40% Gel 15 Gram(s) Oral once PRN  glucagon  Injectable 1 milliGRAM(s) IntraMuscular once PRN      REVIEW OF SYSTEMS  --------------------------------------------------------------------------------  General: no fever, + generalized weakness  CVS: no chest pain  RESP: no sob, no cough  ABD: no abdominal pain  : no dysuria,  MSK: no edema     VITALS/PHYSICAL EXAM  --------------------------------------------------------------------------------  T(C): 36.7 (10-17-19 @ 12:13), Max: 36.7 (10-16-19 @ 21:19)  HR: 76 (10-17-19 @ 12:13) (74 - 76)  BP: 98/63 (10-17-19 @ 12:13) (98/63 - 113/69)  RR: 17 (10-17-19 @ 12:13) (17 - 20)  SpO2: 97% (10-17-19 @ 12:13) (97% - 99%)  Wt(kg): --        10-16-19 @ 07:01  -  10-17-19 @ 07:00  --------------------------------------------------------  IN: 1200 mL / OUT: 850 mL / NET: 350 mL    10-17-19 @ 07:01  -  10-17-19 @ 16:21  --------------------------------------------------------  IN: 717 mL / OUT: 0 mL / NET: 717 mL      Physical Exam:  	Gen: NAD, appears fatigued  	HEENT: MMM  	Pulm: CTA B/L, poor inspiratory effort  	CV: S1S2, + murmur  	Abd: Soft, +BS  	Ext: No LE edema B/L              Neuro: Awake   	Skin: Warm and Dry   	    LABS/STUDIES  --------------------------------------------------------------------------------              10.0   9.68  >-----------<  241      [10-17-19 @ 06:22]              31.8     138  |  103  |  55  ----------------------------<  133      [10-17-19 @ 06:22]  3.9   |  21  |  1.83        Ca     9.0     [10-17-19 @ 06:22]      Mg     2.5     [10-16-19 @ 06:09]      Phos  2.4     [10-16-19 @ 06:09]    TPro  7.5  /  Alb  3.4  /  TBili  0.5  /  DBili  x   /  AST  49  /  ALT  251  /  AlkPhos  101  [10-16-19 @ 06:09]          Creatinine Trend:  SCr 1.83 [10-17 @ 06:22]  SCr 1.89 [10-16 @ 06:09]  SCr 2.09 [10-15 @ 04:19]  SCr 2.29 [10-14 @ 05:51]  SCr 2.36 [10-13 @ 12:27]    Urinalysis - [10-08-19 @ 02:35]      Color Yellow / Appearance Clear / SG 1.016 / pH 5.5      Gluc Trace / Ketone Negative  / Bili Negative / Urobili Negative       Blood Negative / Protein 30 mg/dL / Leuk Est Negative / Nitrite Negative      RBC 1 / WBC 0 / Hyaline 6 / Gran 3-5 / Sq Epi  / Non Sq Epi 2 / Bacteria Negative    Urine Creatinine 40      [10-15-19 @ 16:32]  Urine Protein 70      [10-15-19 @ 16:32]    Iron 44, TIBC 424, %sat 10      [10-13-19 @ 04:48]  Ferritin 78      [10-13-19 @ 04:46]  .4 (Ca --)      [04-25-19 @ 05:45]   --  Vitamin D (25OH) 25.9      [05-01-19 @ 04:00]  HbA1c 7.5      [10-08-19 @ 14:30]  TSH 1.01      [10-08-19 @ 14:47]  Lipid: chol 83, , HDL 14, LDL 46      [10-08-19 @ 14:48]    HBsAb Nonreact      [10-14-19 @ 09:34]  HBsAg Nonreact      [10-14-19 @ 09:34]  HCV 0.13, Nonreact      [10-14-19 @ 09:34]  HIV Nonreact      [10-14-19 @ 09:23]    DEAN: titer 1:320, pattern Homogeneous      [10-14-19 @ 09:23]  C3 Complement 111      [10-14-19 @ 09:35]  C4 Complement 39      [10-14-19 @ 09:35]  Rheumatoid Factor <10      [10-14-19 @ 09:30]  ANCA: cANCA Negative, pANCA Negative, atypical ANCA Indeterminate      [10-16-19 @ 10:46]  Syphilis Screen (Treponema Pallidum Ab) Positive      [10-14-19 @ 09:23]  Syphilis Screen (RPR Titer) <1:1      [10-14-19 @ 09:23]  Immunofixation Serum:   Weak IgG Lambda Band Identified    Reference Range: None Detected      [10-15-19 @ 14:35]  SPEP Interpretation: Weak Gamma-Migrating Paraprotein Identified      [10-15-19 @ 14:35]  Immunofixation Urine: Reference Range: None Detected      [10-15-19 @ 15:36]  UPEP Interpretation: Mild Selective (Glomerular) Proteinuria      [10-15-19 @ 15:36]

## 2019-10-17 NOTE — PROGRESS NOTE ADULT - SUBJECTIVE AND OBJECTIVE BOX
CHIEF COMPLAINT:    SUBJECTIVE:     REVIEW OF SYSTEMS:    CONSTITUTIONAL: (  )  weakness,  (  ) fevers or chills  EYES/ENT: (  )visual changes;     NECK: (  ) pain or stiffness  RESPIRATORY:   (  )cough, wheezing, hemoptysis;  (  ) shortness of breath  CARDIOVASCULAR:  (  )chest pain or palpitations  GASTROINTESTINAL:   (  )abdominal or epigastric pain.  (  ) nausea, vomiting, or hematemesis;   (   ) diarrhea or constipation.   GENITOURINARY:   (    ) dysuria, frequency or hematuria  NEUROLOGICAL:  (   ) numbness or weakness   All other review of systems is negative unless indicated above    Vital Signs Last 24 Hrs  T(C): 36.7 (17 Oct 2019 04:57), Max: 36.7 (16 Oct 2019 12:36)  T(F): 98.1 (17 Oct 2019 04:57), Max: 98.1 (17 Oct 2019 04:57)  HR: 75 (17 Oct 2019 04:57) (68 - 75)  BP: 113/68 (17 Oct 2019 04:57) (101/64 - 113/69)  BP(mean): 84 (16 Oct 2019 17:34) (84 - 84)  RR: 18 (17 Oct 2019 04:57) (18 - 21)  SpO2: 98% (17 Oct 2019 04:57) (98% - 99%)    I&O's Summary    16 Oct 2019 07:01  -  17 Oct 2019 07:00  --------------------------------------------------------  IN: 1200 mL / OUT: 850 mL / NET: 350 mL        CAPILLARY BLOOD GLUCOSE      POCT Blood Glucose.: 289 mg/dL (16 Oct 2019 21:54)  POCT Blood Glucose.: 206 mg/dL (16 Oct 2019 17:29)  POCT Blood Glucose.: 172 mg/dL (16 Oct 2019 13:15)  POCT Blood Glucose.: 153 mg/dL (16 Oct 2019 09:03)      PHYSICAL EXAM:    Constitutional:  (   ) NAD,   (   )awake and alert  HEENT: PERR, EOMI,    Neck: Soft and supple, No LAD, No JVD  Respiratory:  (    Breath sounds are clear bilaterally,    (   ) wheezing, rales or rhonchi  Cardiovascular:     (   )S1 and S2, regular rate and rhythm, no Murmurs, gallops or rubs  Gastrointestinal:  (   )Bowel Sounds present, soft,   (  )nontender, nondistended,    Extremities:    (  ) peripheral edema  Vascular: 2+ peripheral pulses  Neurological:    (    )A/O x 3,   (  ) focal deficits  Musculoskeletal:    (   )  normal strength b/l upper  (     ) normal  lower extremities  Skin: No rashes    MEDICATIONS:  MEDICATIONS  (STANDING):  aspirin enteric coated 81 milliGRAM(s) Oral daily  atorvastatin 80 milliGRAM(s) Oral at bedtime  chlorhexidine 2% Cloths 1 Application(s) Topical <User Schedule>  clopidogrel Tablet 75 milliGRAM(s) Oral daily  dextrose 5%. 1000 milliLiter(s) (50 mL/Hr) IV Continuous <Continuous>  dextrose 50% Injectable 12.5 Gram(s) IV Push once  dextrose 50% Injectable 25 Gram(s) IV Push once  dextrose 50% Injectable 25 Gram(s) IV Push once  docusate sodium 100 milliGRAM(s) Oral two times a day  heparin  Injectable 5000 Unit(s) SubCutaneous every 12 hours  insulin glargine Injectable (LANTUS) 50 Unit(s) SubCutaneous at bedtime  insulin lispro (HumaLOG) corrective regimen sliding scale   SubCutaneous three times a day before meals  insulin lispro (HumaLOG) corrective regimen sliding scale   SubCutaneous at bedtime  insulin lispro Injectable (HumaLOG) 10 Unit(s) SubCutaneous three times a day before meals  levothyroxine 50 MICROGram(s) Oral daily  melatonin 5 milliGRAM(s) Oral at bedtime  metoprolol tartrate 12.5 milliGRAM(s) Oral two times a day  midodrine 10 milliGRAM(s) Oral every 8 hours  pantoprazole    Tablet 40 milliGRAM(s) Oral before breakfast  polyethylene glycol 3350 17 Gram(s) Oral daily  senna 2 Tablet(s) Oral at bedtime      LABS: All Labs Reviewed:                        10.0   9.68  )-----------( 241      ( 17 Oct 2019 06:22 )             31.8     10-17    138  |  103  |  55<H>  ----------------------------<  133<H>  3.9   |  21<L>  |  1.83<H>    Ca    9.0      17 Oct 2019 06:22  Phos  2.4     10-16  Mg     2.5     10-16    TPro  7.5  /  Alb  3.4  /  TBili  0.5  /  DBili  x   /  AST  49<H>  /  ALT  251<H>  /  AlkPhos  101  10-16          Blood Culture:   Urine Culture      RADIOLOGY/EKG:    ASSESSMENT AND PLAN:             + rpr?  DVT PPX:    ADVANCED DIRECTIVE:    DISPOSITION: CHIEF COMPLAINT: no event overnight  patient was seen today in  the floor  sitting in the bed awake with verbal but seems to be depressed without cough or chest pain or other symptom  patient is 73-year-old female who was admitted secondary to shortness of breath2/2 acute decompensated heart failure  SUBJECTIVE:     REVIEW OF SYSTEMS:    CONSTITUTIONAL: (  )  weakness,  (  ) fevers or chills  EYES/ENT: (  )visual changes;     NECK: (  ) pain or stiffness  RESPIRATORY:   (  )cough, wheezing, hemoptysis;  (  ) shortness of breath  CARDIOVASCULAR:  (  )chest pain or palpitations  GASTROINTESTINAL:   (  )abdominal or epigastric pain.  (  ) nausea, vomiting, or hematemesis;   (   ) diarrhea or constipation.   GENITOURINARY:   (    ) dysuria, frequency or hematuria  NEUROLOGICAL:  (   ) numbness or weakness   All other review of systems is negative unless indicated above    Vital Signs Last 24 Hrs  T(C): 36.7 (17 Oct 2019 04:57), Max: 36.7 (16 Oct 2019 12:36)  T(F): 98.1 (17 Oct 2019 04:57), Max: 98.1 (17 Oct 2019 04:57)  HR: 75 (17 Oct 2019 04:57) (68 - 75)  BP: 113/68 (17 Oct 2019 04:57) (101/64 - 113/69)  BP(mean): 84 (16 Oct 2019 17:34) (84 - 84)  RR: 18 (17 Oct 2019 04:57) (18 - 21)  SpO2: 98% (17 Oct 2019 04:57) (98% - 99%)    I&O's Summary    16 Oct 2019 07:01  -  17 Oct 2019 07:00  --------------------------------------------------------  IN: 1200 mL / OUT: 850 mL / NET: 350 mL        CAPILLARY BLOOD GLUCOSE      POCT Blood Glucose.: 289 mg/dL (16 Oct 2019 21:54)  POCT Blood Glucose.: 206 mg/dL (16 Oct 2019 17:29)  POCT Blood Glucose.: 172 mg/dL (16 Oct 2019 13:15)  POCT Blood Glucose.: 153 mg/dL (16 Oct 2019 09:03)      PHYSICAL EXAM:     Constitutional:  ( x  ) NAD,   ( x  )awake and alert ill  appearing woman  HEENT: PERR, EOMI,    Neck: Soft and supple, No LAD, No JVD  Respiratory:  (  x  Breath sounds are clear bilaterally,    (   ) wheezing, rales or rhonchi  Cardiovascular:     (x   )S1 and S2, regular rate and rhythm, no Murmurs, gallops or rubs  Gastrointestinal:  ( x  )Bowel Sounds present, soft,   (  )nontender, nondistended,    Extremities:    (  ) peripheral edema  Vascular: 2+ peripheral pulses  Neurological:    ( x   )A/O x 3,   (  ) focal deficits  Musculoskeletal:    (   )  normal strength b/l upper  (     ) normal  lower extremities  Skin: No rashes       MEDICATIONS:  MEDICATIONS  (STANDING):  aspirin enteric coated 81 milliGRAM(s) Oral daily  atorvastatin 80 milliGRAM(s) Oral at bedtime  chlorhexidine 2% Cloths 1 Application(s) Topical <User Schedule>  clopidogrel Tablet 75 milliGRAM(s) Oral daily  dextrose 5%. 1000 milliLiter(s) (50 mL/Hr) IV Continuous <Continuous>  dextrose 50% Injectable 12.5 Gram(s) IV Push once  dextrose 50% Injectable 25 Gram(s) IV Push once  dextrose 50% Injectable 25 Gram(s) IV Push once  docusate sodium 100 milliGRAM(s) Oral two times a day  heparin  Injectable 5000 Unit(s) SubCutaneous every 12 hours  insulin glargine Injectable (LANTUS) 50 Unit(s) SubCutaneous at bedtime  insulin lispro (HumaLOG) corrective regimen sliding scale   SubCutaneous three times a day before meals  insulin lispro (HumaLOG) corrective regimen sliding scale   SubCutaneous at bedtime  insulin lispro Injectable (HumaLOG) 10 Unit(s) SubCutaneous three times a day before meals  levothyroxine 50 MICROGram(s) Oral daily  melatonin 5 milliGRAM(s) Oral at bedtime  metoprolol tartrate 12.5 milliGRAM(s) Oral two times a day  midodrine 10 milliGRAM(s) Oral every 8 hours  pantoprazole    Tablet 40 milliGRAM(s) Oral before breakfast  polyethylene glycol 3350 17 Gram(s) Oral daily  senna 2 Tablet(s) Oral at bedtime      LABS: All Labs Reviewed:                        10.0   9.68  )-----------( 241      ( 17 Oct 2019 06:22 )             31.8     10-17    138  |  103  |  55<H>  ----------------------------<  133<H>  3.9   |  21<L>  |  1.83<H>    Ca    9.0      17 Oct 2019 06:22  Phos  2.4     10-16  Mg     2.5     10-16    TPro  7.5  /  Alb  3.4  /  TBili  0.5  /  DBili  x   /  AST  49<H>  /  ALT  251<H>  /  AlkPhos  101  10-16          Blood Culture:   Urine Culture      RADIOLOGY/EKG:    ASSESSMENT AND PLAN:    72 y/o F w/ PMH of HTN, HLD, DM2, GERD, CAD s/p CABG (LIMA to LAD, SVG to OM, SVG to PDA; 2014), severe MR s/p MitraClip, severe pHTN, CKD4, hypoTH c/o dyspnea x 2 days suspect 2/2  NSTEMI +/- ADHF w/ e/o organ hypoperfusion.    #cardiovascular  NSTEMI, acute pulmonary edema, elevated lactic acid levels.  Elevated biomarkers and transaminitis.  Paroxysmal SVT noted.  RHC and IABP insertion.  patient may benefit from left heart catheterization as per cardiology they will decide if she is a good candidate discussed with CCU team before transfer to the floor       #Neuro - No active issues  #Resp - Off supplemental O2, sating well on RA. Vol optimize, as above.  #GI - Heart healthy diet. Trend liver tests, downtrending   #Endo -  Her blood sugar elevated today  off   IV insulin 4 cc/h     currently she is on was changed secondary to hypoglycemia      (LANTUS) 50 Unit(s) SubCutaneous at bedtime  insulin lispro (HumaLOG) corrective regimen sliding scale   SubCutaneous three times a day before meals  insulin lispro (HumaLOG) corrective regimen sliding scale   SubCutaneous at bedtime  insulin lispro Injectable (HumaLOG) 10 Unit(s) SubCutaneous three times a day before meals    #Renal - Vol optimize, as above. MERVIN likely 2/2 acute congestion. continue to monitor creatinine.    #ID  + RPR  follow-up with ID and repeat the titer     #Hem - HSQ for DVT PPX. iron studies. will transfuse 1 unit PRBCS      DVT PPX:    ADVANCED DIRECTIVE:    DISPOSITION: home as per the request

## 2019-10-18 VITALS
HEART RATE: 78 BPM | SYSTOLIC BLOOD PRESSURE: 108 MMHG | RESPIRATION RATE: 18 BRPM | TEMPERATURE: 98 F | OXYGEN SATURATION: 100 % | DIASTOLIC BLOOD PRESSURE: 67 MMHG

## 2019-10-18 LAB
CRYOGLOB SERPL-MCNC: NEGATIVE — SIGNIFICANT CHANGE UP
GLUCOSE BLDC GLUCOMTR-MCNC: 190 MG/DL — HIGH (ref 70–99)
GLUCOSE BLDC GLUCOMTR-MCNC: 240 MG/DL — HIGH (ref 70–99)
HCT VFR BLD CALC: 30.6 % — LOW (ref 34.5–45)
HGB BLD-MCNC: 9.2 G/DL — LOW (ref 11.5–15.5)
MCHC RBC-ENTMCNC: 26.4 PG — LOW (ref 27–34)
MCHC RBC-ENTMCNC: 30.1 GM/DL — LOW (ref 32–36)
MCV RBC AUTO: 87.9 FL — SIGNIFICANT CHANGE UP (ref 80–100)
NRBC # BLD: 0 /100 WBCS — SIGNIFICANT CHANGE UP (ref 0–0)
PLATELET # BLD AUTO: 256 K/UL — SIGNIFICANT CHANGE UP (ref 150–400)
RBC # BLD: 3.48 M/UL — LOW (ref 3.8–5.2)
RBC # FLD: 18 % — HIGH (ref 10.3–14.5)
WBC # BLD: 7.73 K/UL — SIGNIFICANT CHANGE UP (ref 3.8–10.5)
WBC # FLD AUTO: 7.73 K/UL — SIGNIFICANT CHANGE UP (ref 3.8–10.5)

## 2019-10-18 RX ORDER — MIDODRINE HYDROCHLORIDE 2.5 MG/1
1 TABLET ORAL
Qty: 42 | Refills: 0
Start: 2019-10-18 | End: 2019-10-31

## 2019-10-18 RX ADMIN — Medication 10 UNIT(S): at 08:52

## 2019-10-18 RX ADMIN — Medication 10 UNIT(S): at 13:06

## 2019-10-18 RX ADMIN — Medication 81 MILLIGRAM(S): at 12:01

## 2019-10-18 RX ADMIN — PANTOPRAZOLE SODIUM 40 MILLIGRAM(S): 20 TABLET, DELAYED RELEASE ORAL at 05:04

## 2019-10-18 RX ADMIN — CHLORHEXIDINE GLUCONATE 1 APPLICATION(S): 213 SOLUTION TOPICAL at 08:52

## 2019-10-18 RX ADMIN — Medication 50 MICROGRAM(S): at 05:04

## 2019-10-18 RX ADMIN — MIDODRINE HYDROCHLORIDE 10 MILLIGRAM(S): 2.5 TABLET ORAL at 05:04

## 2019-10-18 RX ADMIN — HEPARIN SODIUM 5000 UNIT(S): 5000 INJECTION INTRAVENOUS; SUBCUTANEOUS at 05:04

## 2019-10-18 RX ADMIN — MIDODRINE HYDROCHLORIDE 10 MILLIGRAM(S): 2.5 TABLET ORAL at 13:09

## 2019-10-18 RX ADMIN — Medication 1: at 08:51

## 2019-10-18 RX ADMIN — Medication 100 MILLIGRAM(S): at 05:04

## 2019-10-18 RX ADMIN — CLOPIDOGREL BISULFATE 75 MILLIGRAM(S): 75 TABLET, FILM COATED ORAL at 12:01

## 2019-10-18 RX ADMIN — POLYETHYLENE GLYCOL 3350 17 GRAM(S): 17 POWDER, FOR SOLUTION ORAL at 12:01

## 2019-10-18 RX ADMIN — Medication 2: at 12:59

## 2019-10-18 RX ADMIN — Medication 12.5 MILLIGRAM(S): at 05:04

## 2019-10-18 NOTE — PROGRESS NOTE ADULT - ATTENDING COMMENTS
NSTEMI, acute pulmonary edema, elevated lactic acid levels.  Elevated biomarkers and transaminitis.  RHC and IABP insertion.  Revascularization when optimized.
NSTEMI, acute pulmonary edema, elevated lactic acid levels.  Elevated biomarkers and transaminitis.  Paroxysmal SVT noted.  RHC and IABP insertion. IIABP weaned yesterday. Patient now hypotensive requiring pressor support.    Revascularization when optimized.
NSTEMI, acute pulmonary edema, elevated lactic acid levels.  Elevated biomarkers and transaminitis.  RHC and IABP insertion.  Revascularization when optimized.
NSTEMI, acute pulmonary edema, elevated lactic acid levels.  Elevated biomarkers and transaminitis.  RHC and IABP insertion. Will wean IABP as tolerated.  Revascularization when optimized
NSTEMI, acute pulmonary edema, elevated lactic acid levels.  Elevated biomarkers and transaminitis.  Paroxysmal SVT noted.  RHC and IABP insertion. IIABP weaned yesterday. Patient now hypotensive requiring pressor support.    Revascularization when optimized.
74 yo woman originally p/w NSTEMI and acute decompensated systolic HF s/p IABP which was weaned yesterday and she is on pressors for persistent hypotension    Will wean Vasopressin as tolerated, start Mitodrine 10 mg po q8hrs  EPS consult for recurrent SVT  Increase Lantus and NPH as FS remain elevated
72 yo woman with CAD s/p CABG, HLD, HLD p/w NSTEMI and acute decompensated systolic CHF requiring placement of IABP     -Continue IABP with diuresis.  Decrease Bumex drip as PAD 12 and CVP is low 4-5, lactate is normal as well   -Still with CP will start NTG for afterload reduction and stop Hydralazine as it can increase demand ischemia   -stop vanco.  switch Zosyn to cefepime given MERVIN on CKD
Suggest to d/c on current insulin regimen and FU endo 2 weeks.  Patient counseled for compliance with consistent low carb diet.
Will continue current insulin regimen for now. Will continue monitoring FS, log, and FU
Will continue current insulin regimen for now. Will continue monitoring FS, log, will Follow up.
Will increase Lantus to 40u at bedtime, increase Humalog to 12u before each meal, and continue coverage scale.

## 2019-10-18 NOTE — CHART NOTE - NSCHARTNOTEFT_GEN_A_CORE
Spoke with Dr Salazar , asked to facilitate patient discharge home. Medication reconciliation reviewed, revised and resolved with Dr Salazar  who has medicaly cleared patient for discharge with follow up advised

## 2019-10-18 NOTE — PROGRESS NOTE ADULT - PROBLEM SELECTOR PLAN 2
Suggest to continue medications, monitoring, FU primary team recommendations.

## 2019-10-18 NOTE — PROGRESS NOTE ADULT - PROVIDER SPECIALTY LIST ADULT
CCU
Cardiology
Electrophysiology
Endocrinology
Internal Medicine
Nephrology
CCU
Internal Medicine
Nephrology
CCU
CCU
Nephrology
Endocrinology

## 2019-10-18 NOTE — PROGRESS NOTE ADULT - ASSESSMENT
74 yo F w/ PMH of CKD stage 4 (baseline Cr 1.9-2.2) HTN, T2DM, CAD s/p CABG X3 (2014 at [facility]), non-dilated ICM (EF 20-25%), severe mitral regurgitation s/p mitral clip (6/13/19), severe pulm HTN, hypothyroidism who presented for evaluation of chest pain, cough, and SOB for the past 2 days and was admitted for ADHF. s/p IABP 10/8/19, removed 10/12/19. Currently medically managing heart failure, no plans for cath at this time. Nephrology is following for MERVIN    MERVIN on CKD stage 4  - baseline Cr 1.9-2.2; has had recurrent MERVIN's over the years  - CKD is likely 2/2 recurrent MERVIN's + underlying DM/HTN  - MERVIN is likely 2/2 CRS; now back to baseline Cr      - renal sonogram w/ parenchymal changes, no hydro, no stones, left simple renal cyst  - Avoid nephrotoxins including NSAIDs, IV contrast (if possible), [employer] products  - monitor I/O's accurately    Metabolic acidosis  - 2/2 lactic acidosis 2/2 hypoperfusion/hypotension/dec cardiac output  - lactic acidosis resolved  - monitor    Hypotension  - likely from dec cardiac output and hypoperfusion  - hold anti-hypertensives  - beta blocker per cardio recs (needed for AVNRT)    - recommend increasing midodrine dose to maintain MAP >65    Proteinuria/Hematuria  - likely from underlying DM  - has 1.8 grams proteinuria  - Hep B, Hep C, HIV RF, C3, C4, p-ANCA, c-ANCA, RPR neg  - weak gamma-migrating protein, weak IgG lambda protein band noted  - DEAN 1:320 positive  - RPR neg, FTA +   - recommend repeat DEAN w/ reflex Ab panel, check dsDNA (can be done as outpt)  - recommend hematology eval for abnormal SPEP/IF (can be done as outpt)

## 2019-10-18 NOTE — PROGRESS NOTE ADULT - SUBJECTIVE AND OBJECTIVE BOX
Chief complaint  Patient is a 73y old  Female who presents with a chief complaint of Acute decompensated heart failure, SOB (18 Oct 2019 08:37)   Review of systems  Patient in bed, looks comfortable, no fever, no hypoglycemia.    Labs and Fingersticks  CAPILLARY BLOOD GLUCOSE      POCT Blood Glucose.: 190 mg/dL (18 Oct 2019 08:47)  POCT Blood Glucose.: 370 mg/dL (17 Oct 2019 21:25)  POCT Blood Glucose.: 283 mg/dL (17 Oct 2019 17:44)  POCT Blood Glucose.: 195 mg/dL (17 Oct 2019 12:51)      Anion Gap, Serum: 14 (10-17 @ 06:22)      Calcium, Total Serum: 9.0 (10-17 @ 06:22)          10-17    138  |  103  |  55<H>  ----------------------------<  133<H>  3.9   |  21<L>  |  1.83<H>    Ca    9.0      17 Oct 2019 06:22                          9.2    7.73  )-----------( 256      ( 18 Oct 2019 07:22 )             30.6     Medications  MEDICATIONS  (STANDING):  aspirin enteric coated 81 milliGRAM(s) Oral daily  atorvastatin 80 milliGRAM(s) Oral at bedtime  chlorhexidine 2% Cloths 1 Application(s) Topical <User Schedule>  clopidogrel Tablet 75 milliGRAM(s) Oral daily  dextrose 5%. 1000 milliLiter(s) (50 mL/Hr) IV Continuous <Continuous>  dextrose 50% Injectable 12.5 Gram(s) IV Push once  dextrose 50% Injectable 25 Gram(s) IV Push once  dextrose 50% Injectable 25 Gram(s) IV Push once  docusate sodium 100 milliGRAM(s) Oral two times a day  heparin  Injectable 5000 Unit(s) SubCutaneous every 12 hours  insulin glargine Injectable (LANTUS) 50 Unit(s) SubCutaneous at bedtime  insulin lispro (HumaLOG) corrective regimen sliding scale   SubCutaneous three times a day before meals  insulin lispro (HumaLOG) corrective regimen sliding scale   SubCutaneous at bedtime  insulin lispro Injectable (HumaLOG) 10 Unit(s) SubCutaneous three times a day before meals  levothyroxine 50 MICROGram(s) Oral daily  melatonin 5 milliGRAM(s) Oral at bedtime  metoprolol tartrate 12.5 milliGRAM(s) Oral two times a day  midodrine 10 milliGRAM(s) Oral every 8 hours  pantoprazole    Tablet 40 milliGRAM(s) Oral before breakfast  polyethylene glycol 3350 17 Gram(s) Oral daily  senna 2 Tablet(s) Oral at bedtime      Physical Exam  General: Patient comfortable in bed  Vital Signs Last 12 Hrs  T(F): 97.6 (10-18-19 @ 04:01), Max: 97.6 (10-18-19 @ 04:01)  HR: 86 (10-18-19 @ 04:01) (86 - 86)  BP: 112/66 (10-18-19 @ 04:01) (112/66 - 112/66)  BP(mean): --  RR: 18 (10-18-19 @ 04:01) (18 - 18)  SpO2: 97% (10-18-19 @ 04:01) (97% - 97%)  Neck: No palpable thyroid nodules.  CVS: S1S2, No murmurs  Respiratory: No wheezing, no crepitations  GI: Abdomen soft, bowel sounds positive  Musculoskeletal:  edema lower extremities.   Skin: No skin rashes, no ecchymosis    Diagnostics Chief complaint  Patient is a 73y old  Female who presents with  a chief complaint of Acute decompensated heart failure, SOB (18 Oct 2019 08:37)   Review of systems  Patient in bed, looks comfortable, no fever, no hypoglycemia.    Labs and Fingersticks  CAPILLARY BLOOD GLUCOSE      POCT Blood Glucose.: 190 mg/dL (18 Oct 2019 08:47)  POCT Blood Glucose.: 370 mg/dL (17 Oct 2019 21:25)  POCT Blood Glucose.: 283 mg/dL (17 Oct 2019 17:44)  POCT Blood Glucose.: 195 mg/dL (17 Oct 2019 12:51)      Anion Gap, Serum: 14 (10-17 @ 06:22)      Calcium, Total Serum: 9.0 (10-17 @ 06:22)          10-17    138  |  103  |  55<H>  ----------------------------<  133<H>  3.9   |  21<L>  |  1.83<H>    Ca    9.0      17 Oct 2019 06:22                          9.2    7.73  )-----------( 256      ( 18 Oct 2019 07:22 )             30.6     Medications  MEDICATIONS  (STANDING):  aspirin enteric coated 81 milliGRAM(s) Oral daily  atorvastatin 80 milliGRAM(s) Oral at bedtime  chlorhexidine 2% Cloths 1 Application(s) Topical <User Schedule>  clopidogrel Tablet 75 milliGRAM(s) Oral daily  dextrose 5%. 1000 milliLiter(s) (50 mL/Hr) IV Continuous <Continuous>  dextrose 50% Injectable 12.5 Gram(s) IV Push once  dextrose 50% Injectable 25 Gram(s) IV Push once  dextrose 50% Injectable 25 Gram(s) IV Push once  docusate sodium 100 milliGRAM(s) Oral two times a day  heparin  Injectable 5000 Unit(s) SubCutaneous every 12 hours  insulin glargine Injectable (LANTUS) 50 Unit(s) SubCutaneous at bedtime  insulin lispro (HumaLOG) corrective regimen sliding scale   SubCutaneous three times a day before meals  insulin lispro (HumaLOG) corrective regimen sliding scale   SubCutaneous at bedtime  insulin lispro Injectable (HumaLOG) 10 Unit(s) SubCutaneous three times a day before meals  levothyroxine 50 MICROGram(s) Oral daily  melatonin 5 milliGRAM(s) Oral at bedtime  metoprolol tartrate 12.5 milliGRAM(s) Oral two times a day  midodrine 10 milliGRAM(s) Oral every 8 hours  pantoprazole    Tablet 40 milliGRAM(s) Oral before breakfast  polyethylene glycol 3350 17 Gram(s) Oral daily  senna 2 Tablet(s) Oral at bedtime      Physical Exam  General: Patient comfortable in bed  Vital Signs Last 12 Hrs  T(F): 97.6 (10-18-19 @ 04:01), Max: 97.6 (10-18-19 @ 04:01)  HR: 86 (10-18-19 @ 04:01) (86 - 86)  BP: 112/66 (10-18-19 @ 04:01) (112/66 - 112/66)  BP(mean): --  RR: 18 (10-18-19 @ 04:01) (18 - 18)  SpO2: 97% (10-18-19 @ 04:01) (97% - 97%)  Neck: No palpable thyroid nodules.  CVS: S1S2, No murmurs  Respiratory: No wheezing, no crepitations  GI: Abdomen soft, bowel sounds positive  Musculoskeletal:  edema lower extremities.   Skin: No skin rashes, no ecchymosis    Diagnostics

## 2019-10-18 NOTE — PROGRESS NOTE ADULT - PROBLEM SELECTOR PLAN 1
Will continue current insulin regimen for now. Will continue monitoring FS, log, and FU.  Suggest to d/c on current insulin regimen and FU endo 2 weeks.  Patient counseled for compliance with consistent low carb diet. Will continue current insulin regimen for now. Will continue monitoring FS, log, and FU.

## 2019-10-18 NOTE — PROGRESS NOTE ADULT - PROBLEM SELECTOR PROBLEM 1
Type 2 diabetes mellitus without complication, with long-term current use of insulin

## 2019-10-18 NOTE — PROGRESS NOTE ADULT - PROBLEM SELECTOR PROBLEM 2
SOB (shortness of breath)

## 2019-10-18 NOTE — CHART NOTE - NSCHARTNOTEFT_GEN_A_CORE
Nutrition Follow Up Note  Patient seen for: consult for [facility] Heart Failure    · Reason for Admission	Acute decompensated heart failure, SOB    72 y/o F w/ PMH of HTN, HLD, DM2, GERD, CAD s/p CABG (LIMA to LAD, SVG to OM, SVG to PDA; ), severe MR s/p MitraClip, severe pHTN, CKD4, hypoTH c/o dyspnea x 2 days suspect 2/2  NSTEMI +/- ADHF w/ e/o organ hypoperfusion.      Discharge on hold for today pending cardiology consult    Source: pt, chart    Diet : consistent carbohydrate with snack, renal, dash    Patient reports: eating well, agrees to written ed on heart failure, pt very tired at time of visit, she could barely keep her eyes open for any period of time. agree to continue preferences reviewed.      PO intake : >75%     Source for PO intake: RD observed breakfast tray including health shakes          Daily Weight in k.3 (10-18), Weight in k.2 (10-17), Weight in k.4 (10-16), Weight in k.7 (10-15), Weight in k.1 (10-15), Weight in k.7 (10-14), Weight in k (10-13)  % Weight Change: 5% gain since previous assess on 10/14    Pertinent Medications: MEDICATIONS  (STANDING):  aspirin enteric coated 81 milliGRAM(s) Oral daily  atorvastatin 80 milliGRAM(s) Oral at bedtime  chlorhexidine 2% Cloths 1 Application(s) Topical <User Schedule>  clopidogrel Tablet 75 milliGRAM(s) Oral daily  dextrose 5%. 1000 milliLiter(s) (50 mL/Hr) IV Continuous <Continuous>  dextrose 50% Injectable 12.5 Gram(s) IV Push once  dextrose 50% Injectable 25 Gram(s) IV Push once  dextrose 50% Injectable 25 Gram(s) IV Push once  docusate sodium 100 milliGRAM(s) Oral two times a day  heparin  Injectable 5000 Unit(s) SubCutaneous every 12 hours  insulin glargine Injectable (LANTUS) 50 Unit(s) SubCutaneous at bedtime  insulin lispro (HumaLOG) corrective regimen sliding scale   SubCutaneous three times a day before meals  insulin lispro (HumaLOG) corrective regimen sliding scale   SubCutaneous at bedtime  insulin lispro Injectable (HumaLOG) 10 Unit(s) SubCutaneous three times a day before meals  levothyroxine 50 MICROGram(s) Oral daily  melatonin 5 milliGRAM(s) Oral at bedtime  metoprolol tartrate 12.5 milliGRAM(s) Oral two times a day  midodrine 10 milliGRAM(s) Oral every 8 hours  pantoprazole    Tablet 40 milliGRAM(s) Oral before breakfast  polyethylene glycol 3350 17 Gram(s) Oral daily  senna 2 Tablet(s) Oral at bedtime    MEDICATIONS  (PRN):  ALPRAZolam 0.25 milliGRAM(s) Oral three times a day PRN Anxiety  dextrose 40% Gel 15 Gram(s) Oral once PRN Blood Glucose LESS THAN 70 milliGRAM(s)/deciliter  glucagon  Injectable 1 milliGRAM(s) IntraMuscular once PRN Glucose LESS THAN 70 milligrams/deciliter    Pertinent Labs:   Finger Sticks:  POCT Blood Glucose.: 190 mg/dL (10-18 @ 08:47)  POCT Blood Glucose.: 370 mg/dL (10-17 @ 21:25)  POCT Blood Glucose.: 283 mg/dL (10-17 @ 17:44)  POCT Blood Glucose.: 195 mg/dL (10-17 @ 12:51)      Skin per nursing documentation: no pressure injury  Edema: none    Estimated Needs:   [ x] no change since previous assessment  [ ] recalculated:     Previous Nutrition Diagnosis: moderate malnutrition  Nutrition Diagnosis is: improving with weight gain and po intake        Recommend  1) Change diet to consistent carbohydrate with snack, 60gram protein, no concentrated K+, low sodium, 1000ml fluid restriction. monitor phosphorous and need to reinitiate restriction.   2) Encourage PO intake and provide feeding assistance PRN   3) Monitor blood glucose and electrolytes   4) continue health shakes, if renal indices not WNL consider changing to [place]  5) adjusted preferences   6) Nephro-Chris daily    Monitoring and Evaluation:     Continue to monitor Nutritional intake, Tolerance to diet prescription, weights, labs, skin integrity    RD remains available upon request and will follow up per protocol  Dara Gunter MA, RD, CDN #[phone]

## 2019-10-18 NOTE — PROGRESS NOTE ADULT - PROBLEM SELECTOR PLAN 5
Monitor labs, Renal FU.

## 2019-10-18 NOTE — PROGRESS NOTE ADULT - SUBJECTIVE AND OBJECTIVE BOX
[facility]   MD Vanita Dasilva D.O. Fatima Sheikh, D.O. Ruoro Wong, PA    From 7 AM - 5 PM:  OFFICE: [phone]  Dr. Muhammad cell: [phone]  Dr. Bowers cell: [phone]  Dr. Soria cell: [phone]  RASHIDA Smith cell: [phone]    From 5 PM - 7 AM: Answering Service: [phone]        RENAL FOLLOW UP NOTE  --------------------------------------------------------------------------------  HPI: Pt seen and examined this AM. States she feels weak but has no specific complaints. Denies any SOB, cough, fever, chills.        PAST HISTORY  --------------------------------------------------------------------------------  No significant changes to PMH, PSH, FHx, SHx, unless otherwise noted    ALLERGIES & MEDICATIONS  --------------------------------------------------------------------------------  Allergies    azithromycin (Hives; Pruritus)    Intolerances      Standing Inpatient Medications  aspirin enteric coated 81 milliGRAM(s) Oral daily  atorvastatin 80 milliGRAM(s) Oral at bedtime  chlorhexidine 2% Cloths 1 Application(s) Topical <User Schedule>  clopidogrel Tablet 75 milliGRAM(s) Oral daily  dextrose 5%. 1000 milliLiter(s) IV Continuous <Continuous>  dextrose 50% Injectable 12.5 Gram(s) IV Push once  dextrose 50% Injectable 25 Gram(s) IV Push once  dextrose 50% Injectable 25 Gram(s) IV Push once  docusate sodium 100 milliGRAM(s) Oral two times a day  heparin  Injectable 5000 Unit(s) SubCutaneous every 12 hours  insulin glargine Injectable (LANTUS) 50 Unit(s) SubCutaneous at bedtime  insulin lispro (HumaLOG) corrective regimen sliding scale   SubCutaneous three times a day before meals  insulin lispro (HumaLOG) corrective regimen sliding scale   SubCutaneous at bedtime  insulin lispro Injectable (HumaLOG) 10 Unit(s) SubCutaneous three times a day before meals  levothyroxine 50 MICROGram(s) Oral daily  melatonin 5 milliGRAM(s) Oral at bedtime  metoprolol tartrate 12.5 milliGRAM(s) Oral two times a day  midodrine 10 milliGRAM(s) Oral every 8 hours  pantoprazole    Tablet 40 milliGRAM(s) Oral before breakfast  polyethylene glycol 3350 17 Gram(s) Oral daily  senna 2 Tablet(s) Oral at bedtime    PRN Inpatient Medications  ALPRAZolam 0.25 milliGRAM(s) Oral three times a day PRN  dextrose 40% Gel 15 Gram(s) Oral once PRN  glucagon  Injectable 1 milliGRAM(s) IntraMuscular once PRN      REVIEW OF SYSTEMS  --------------------------------------------------------------------------------  General: no fever  CVS: no chest pain  RESP: no sob, no cough  ABD: no abdominal pain  : no dysuria,  MSK: no edema     VITALS/PHYSICAL EXAM  --------------------------------------------------------------------------------  T(C): 36.9 (10-18-19 @ 12:05), Max: 36.9 (10-18-19 @ 12:05)  HR: 78 (10-18-19 @ 12:05) (78 - 86)  BP: 108/67 (10-18-19 @ 12:05) (107/69 - 112/66)  RR: 18 (10-18-19 @ 12:05) (18 - 18)  SpO2: 100% (10-18-19 @ 12:05) (97% - 100%)  Wt(kg): --        10-17-19 @ 07:01  -  10-18-19 @ 07:00  --------------------------------------------------------  IN: 1197 mL / OUT: 689 mL / NET: 508 mL    10-18-19 @ 07:01  -  10-18-19 @ 16:30  --------------------------------------------------------  IN: 480 mL / OUT: 300 mL / NET: 180 mL      Physical Exam:  	Gen: NAD  	HEENT: MMM  	Pulm: CTA B/L  	CV: S1S2, + murmur  	Abd: Soft, +BS  	Ext: No LE edema B/L              Neuro: Awake   	Skin: Warm and Dry   	    LABS/STUDIES  --------------------------------------------------------------------------------              9.2    7.73  >-----------<  256      [10-18-19 @ 07:22]              30.6     138  |  103  |  55  ----------------------------<  133      [10-17-19 @ 06:22]  3.9   |  21  |  1.83        Ca     9.0     [10-17-19 @ 06:22]            Creatinine Trend:  SCr 1.83 [10-17 @ 06:22]  SCr 1.89 [10-16 @ 06:09]  SCr 2.09 [10-15 @ 04:19]  SCr 2.29 [10-14 @ 05:51]  SCr 2.36 [10-13 @ 12:27]    Urinalysis - [10-08-19 @ 02:35]      Color Yellow / Appearance Clear / SG 1.016 / pH 5.5      Gluc Trace / Ketone Negative  / Bili Negative / Urobili Negative       Blood Negative / Protein 30 mg/dL / Leuk Est Negative / Nitrite Negative      RBC 1 / WBC 0 / Hyaline 6 / Gran 3-5 / Sq Epi  / Non Sq Epi 2 / Bacteria Negative    Urine Creatinine 40      [10-15-19 @ 16:32]  Urine Protein 70      [10-15-19 @ 16:32]    Iron 44, TIBC 424, %sat 10      [10-13-19 @ 04:48]  Ferritin 78      [10-13-19 @ 04:46]  .4 (Ca --)      [04-25-19 @ 05:45]   --  Vitamin D (25OH) 25.9      [05-01-19 @ 04:00]  HbA1c 7.5      [10-08-19 @ 14:30]  TSH 1.01      [10-08-19 @ 14:47]  Lipid: chol 83, , HDL 14, LDL 46      [10-08-19 @ 14:48]    HBsAb Nonreact      [10-14-19 @ 09:34]  HBsAg Nonreact      [10-14-19 @ 09:34]  HCV 0.13, Nonreact      [10-14-19 @ 09:34]  HIV Nonreact      [10-14-19 @ 09:23]    DEAN: titer 1:320, pattern Homogeneous      [10-14-19 @ 09:23]  C3 Complement 111      [10-14-19 @ 09:35]  C4 Complement 39      [10-14-19 @ 09:35]  Rheumatoid Factor <10      [10-14-19 @ 09:30]  ANCA: cANCA Negative, pANCA Negative, atypical ANCA Indeterminate      [10-16-19 @ 10:46]  Syphilis Screen (Treponema Pallidum Ab) Positive      [10-14-19 @ 09:23]  Syphilis Screen (RPR Titer) <1:1      [10-14-19 @ 09:23]  Immunofixation Serum:   Weak IgG Lambda Band Identified    Reference Range: None Detected      [10-15-19 @ 14:35]  SPEP Interpretation: Weak Gamma-Migrating Paraprotein Identified      [10-15-19 @ 14:35]  Immunofixation Urine: Reference Range: None Detected      [10-15-19 @ 15:36]  UPEP Interpretation: Mild Selective (Glomerular) Proteinuria      [10-15-19 @ 15:36]  Cryoglobulin: Negative      [10-15-19 @ 14:51]

## 2019-10-18 NOTE — PROGRESS NOTE ADULT - PROBLEM SELECTOR PLAN 4
Suggest to continue home-dose synthroid. Will continue monitoring and FU.

## 2019-10-18 NOTE — PROGRESS NOTE ADULT - SUBJECTIVE AND OBJECTIVE BOX
CHIEF COMPLAINT:    SUBJECTIVE:     REVIEW OF SYSTEMS:    CONSTITUTIONAL: (  )  weakness,  (  ) fevers or chills  EYES/ENT: (  )visual changes;     NECK: (  ) pain or stiffness  RESPIRATORY:   (  )cough, wheezing, hemoptysis;  (  ) shortness of breath  CARDIOVASCULAR:  (  )chest pain or palpitations  GASTROINTESTINAL:   (  )abdominal or epigastric pain.  (  ) nausea, vomiting, or hematemesis;   (   ) diarrhea or constipation.   GENITOURINARY:   (    ) dysuria, frequency or hematuria  NEUROLOGICAL:  (   ) numbness or weakness   All other review of systems is negative unless indicated above    Vital Signs Last 24 Hrs  T(C): 36.4 (18 Oct 2019 04:01), Max: 36.7 (17 Oct 2019 12:13)  T(F): 97.6 (18 Oct 2019 04:01), Max: 98 (17 Oct 2019 12:13)  HR: 86 (18 Oct 2019 04:01) (76 - 86)  BP: 112/66 (18 Oct 2019 04:01) (98/63 - 112/66)  BP(mean): --  RR: 18 (18 Oct 2019 04:01) (17 - 18)  SpO2: 97% (18 Oct 2019 04:01) (97% - 97%)    I&O's Summary    17 Oct 2019 07:01  -  18 Oct 2019 07:00  --------------------------------------------------------  IN: 1197 mL / OUT: 689 mL / NET: 508 mL        CAPILLARY BLOOD GLUCOSE      POCT Blood Glucose.: 370 mg/dL (17 Oct 2019 21:25)  POCT Blood Glucose.: 283 mg/dL (17 Oct 2019 17:44)  POCT Blood Glucose.: 195 mg/dL (17 Oct 2019 12:51)  POCT Blood Glucose.: 105 mg/dL (17 Oct 2019 08:59)      PHYSICAL EXAM:    Constitutional:  (   ) NAD,   (   )awake and alert  HEENT: PERR, EOMI,    Neck: Soft and supple, No LAD, No JVD  Respiratory:  (    Breath sounds are clear bilaterally,    (   ) wheezing, rales or rhonchi  Cardiovascular:     (   )S1 and S2, regular rate and rhythm, no Murmurs, gallops or rubs  Gastrointestinal:  (   )Bowel Sounds present, soft,   (  )nontender, nondistended,    Extremities:    (  ) peripheral edema  Vascular: 2+ peripheral pulses  Neurological:    (    )A/O x 3,   (  ) focal deficits  Musculoskeletal:    (   )  normal strength b/l upper  (     ) normal  lower extremities  Skin: No rashes    MEDICATIONS:  MEDICATIONS  (STANDING):  aspirin enteric coated 81 milliGRAM(s) Oral daily  atorvastatin 80 milliGRAM(s) Oral at bedtime  chlorhexidine 2% Cloths 1 Application(s) Topical <User Schedule>  clopidogrel Tablet 75 milliGRAM(s) Oral daily  dextrose 5%. 1000 milliLiter(s) (50 mL/Hr) IV Continuous <Continuous>  dextrose 50% Injectable 12.5 Gram(s) IV Push once  dextrose 50% Injectable 25 Gram(s) IV Push once  dextrose 50% Injectable 25 Gram(s) IV Push once  docusate sodium 100 milliGRAM(s) Oral two times a day  heparin  Injectable 5000 Unit(s) SubCutaneous every 12 hours  insulin glargine Injectable (LANTUS) 50 Unit(s) SubCutaneous at bedtime  insulin lispro (HumaLOG) corrective regimen sliding scale   SubCutaneous three times a day before meals  insulin lispro (HumaLOG) corrective regimen sliding scale   SubCutaneous at bedtime  insulin lispro Injectable (HumaLOG) 10 Unit(s) SubCutaneous three times a day before meals  levothyroxine 50 MICROGram(s) Oral daily  melatonin 5 milliGRAM(s) Oral at bedtime  metoprolol tartrate 12.5 milliGRAM(s) Oral two times a day  midodrine 10 milliGRAM(s) Oral every 8 hours  pantoprazole    Tablet 40 milliGRAM(s) Oral before breakfast  polyethylene glycol 3350 17 Gram(s) Oral daily  senna 2 Tablet(s) Oral at bedtime      LABS: All Labs Reviewed:                        9.2    7.73  )-----------( 256      ( 18 Oct 2019 07:22 )             30.6     10-17    138  |  103  |  55<H>  ----------------------------<  133<H>  3.9   |  21<L>  |  1.83<H>    Ca    9.0      17 Oct 2019 06:22            Blood Culture:   Urine Culture      RADIOLOGY/EKG:    ASSESSMENT AND PLAN:    DVT PPX:    ADVANCED DIRECTIVE:    DISPOSITION: CHIEF COMPLAINT:  patient condition remained stable no event overnight except hyperglycemia  SUBJECTIVE:     REVIEW OF SYSTEMS:    CONSTITUTIONAL: ( x )  weakness,  (  ) fevers or chills  EYES/ENT: (  )visual changes;     NECK: (  ) pain or stiffness  RESPIRATORY:   (  )cough, wheezing, hemoptysis;  (  ) shortness of breath  CARDIOVASCULAR:  (  )chest pain or palpitations  GASTROINTESTINAL:   (  )abdominal or epigastric pain.  (  ) nausea, vomiting, or hematemesis;   (   ) diarrhea or constipation.   GENITOURINARY:   (    ) dysuria, frequency or hematuria  NEUROLOGICAL:  (   ) numbness or weakness   All other review of systems is negative unless indicated above    Vital Signs Last 24 Hrs  T(C): 36.4 (18 Oct 2019 04:01), Max: 36.7 (17 Oct 2019 12:13)  T(F): 97.6 (18 Oct 2019 04:01), Max: 98 (17 Oct 2019 12:13)  HR: 86 (18 Oct 2019 04:01) (76 - 86)  BP: 112/66 (18 Oct 2019 04:01) (98/63 - 112/66)  BP(mean): --  RR: 18 (18 Oct 2019 04:01) (17 - 18)  SpO2: 97% (18 Oct 2019 04:01) (97% - 97%)    I&O's Summary    17 Oct 2019 07:01  -  18 Oct 2019 07:00  --------------------------------------------------------  IN: 1197 mL / OUT: 689 mL / NET: 508 mL        CAPILLARY BLOOD GLUCOSE      POCT Blood Glucose.: 370 mg/dL (17 Oct 2019 21:25)  POCT Blood Glucose.: 283 mg/dL (17 Oct 2019 17:44)  POCT Blood Glucose.: 195 mg/dL (17 Oct 2019 12:51)  POCT Blood Glucose.: 105 mg/dL (17 Oct 2019 08:59)      PHYSICAL EXAM:    Constitutional:  ( x  ) NAD,   (   )awake and alert  HEENT: PERR, EOMI,    Neck: Soft and supple, No LAD, No JVD  Respiratory:  (  x  Breath sounds are clear bilaterally,    (   ) wheezing, rales or rhonchi  Cardiovascular:     (  x )S1 and S2, regular rate and rhythm, no Murmurs, gallops or rubs  Gastrointestinal:  (  x )Bowel Sounds present, soft,   (  )nontender, nondistended,    Extremities:    ( x ) peripheral edema  Vascular: 2+ peripheral pulses  Neurological:    (  x  )A/O x 3,   (  ) focal deficits  Musculoskeletal:    ( x  )  normal strength b/l upper  (     ) normal  lower extremities  Skin: No rashes    MEDICATIONS:  MEDICATIONS  (STANDING):  aspirin enteric coated 81 milliGRAM(s) Oral daily  atorvastatin 80 milliGRAM(s) Oral at bedtime  chlorhexidine 2% Cloths 1 Application(s) Topical <User Schedule>  clopidogrel Tablet 75 milliGRAM(s) Oral daily  dextrose 5%. 1000 milliLiter(s) (50 mL/Hr) IV Continuous <Continuous>  dextrose 50% Injectable 12.5 Gram(s) IV Push once  dextrose 50% Injectable 25 Gram(s) IV Push once  dextrose 50% Injectable 25 Gram(s) IV Push once  docusate sodium 100 milliGRAM(s) Oral two times a day  heparin  Injectable 5000 Unit(s) SubCutaneous every 12 hours  insulin glargine Injectable (LANTUS) 50 Unit(s) SubCutaneous at bedtime  insulin lispro (HumaLOG) corrective regimen sliding scale   SubCutaneous three times a day before meals  insulin lispro (HumaLOG) corrective regimen sliding scale   SubCutaneous at bedtime  insulin lispro Injectable (HumaLOG) 10 Unit(s) SubCutaneous three times a day before meals  levothyroxine 50 MICROGram(s) Oral daily  melatonin 5 milliGRAM(s) Oral at bedtime  metoprolol tartrate 12.5 milliGRAM(s) Oral two times a day  midodrine 10 milliGRAM(s) Oral every 8 hours  pantoprazole    Tablet 40 milliGRAM(s) Oral before breakfast  polyethylene glycol 3350 17 Gram(s) Oral daily  senna 2 Tablet(s) Oral at bedtime      LABS: All Labs Reviewed:                        9.2    7.73  )-----------( 256      ( 18 Oct 2019 07:22 )             30.6     10-17    138  |  103  |  55<H>  ----------------------------<  133<H>  3.9   |  21<L>  |  1.83<H>    Ca    9.0      17 Oct 2019 06:22            Blood Culture:   Urine Culture      RADIOLOGY/EKG:    ASSESSMENT AND PLAN:  patient condition is stable to be discharged home will ask cardiology to see her before discharge she is high risk for left heart catheterization  Diabetes stable on Lantus 50 units and regular insulin 10 units before meal  Overall she is high risk for readmission secondary to CAD CHF and diabetes  DVT PPX:    ADVANCED DIRECTIVE:    DISPOSITION:

## 2019-10-18 NOTE — PROGRESS NOTE ADULT - PROBLEM SELECTOR PLAN 3
On medications,  no chest pain, stable, monitored and followed up by primary team/cardiology team.

## 2019-10-18 NOTE — CONSULT NOTE ADULT - REASON FOR ADMISSION
Acute decompensated heart failure, SOB

## 2019-10-18 NOTE — CONSULT NOTE ADULT - SUBJECTIVE AND OBJECTIVE BOX
CARDIOLOGY ATTENDING    History: She is a 72 y/o female PMH CKD and a severe ischemic cardiomyopathy (CAD s/p CABG) and severe MR (s/p MitraClip) who has refused ICD implantation. She is now admitted with a severe CHF exacerbation requiring IABP support and diuretics. She was admitted on 10/8/19 and transferred out of the CCU on 10/16/19. We are now called to assume cardiac care as her outpatient cardiologist is Dr Quick and Dr Sotelo. She currently appears euvolemic, and denies palpitations/syncope/angina.    PMH: as above, HTN, DM, hyperlipidemia, CAD s/p CABG, severe MR s/p MitraCilp, medically managed typical AVNRT, severe CHF refusing ICD, pulmonary HTN, CKD, hypothyroidism, GERD    MEDICATIONS  (STANDING):  aspirin enteric coated 81 milliGRAM(s) Oral daily  atorvastatin 80 milliGRAM(s) Oral at bedtime  chlorhexidine 2% Cloths 1 Application(s) Topical <User Schedule>  clopidogrel Tablet 75 milliGRAM(s) Oral daily  dextrose 5%. 1000 milliLiter(s) (50 mL/Hr) IV Continuous <Continuous>  dextrose 50% Injectable 12.5 Gram(s) IV Push once  dextrose 50% Injectable 25 Gram(s) IV Push once  dextrose 50% Injectable 25 Gram(s) IV Push once  docusate sodium 100 milliGRAM(s) Oral two times a day  heparin  Injectable 5000 Unit(s) SubCutaneous every 12 hours  insulin glargine Injectable (LANTUS) 50 Unit(s) SubCutaneous at bedtime  insulin lispro (HumaLOG) corrective regimen sliding scale   SubCutaneous three times a day before meals  insulin lispro (HumaLOG) corrective regimen sliding scale   SubCutaneous at bedtime  insulin lispro Injectable (HumaLOG) 10 Unit(s) SubCutaneous three times a day before meals  levothyroxine 50 MICROGram(s) Oral daily  melatonin 5 milliGRAM(s) Oral at bedtime  metoprolol tartrate 12.5 milliGRAM(s) Oral two times a day  midodrine 10 milliGRAM(s) Oral every 8 hours  pantoprazole    Tablet 40 milliGRAM(s) Oral before breakfast  polyethylene glycol 3350 17 Gram(s) Oral daily  senna 2 Tablet(s) Oral at bedtime      Allergies  azithromycin (Hives; Pruritus)    FAMILY HISTORY:  Family history of hypertension (Sibling): sister  Family history of diabetes mellitus (Sibling): brothers/sisters  Non-contributary for premature coronary disease or sudden cardiac death    SOCIAL HISTORY:    [x ] Non-smoker  [ ] Smoker  [ ] Alcohol      REVIEW OF SYSTEMS:  [ ]chest pain  [  ]shortness of breath  [  ]palpitations  [  ]syncope  [ ]near syncope [ ]upper extremity weakness   [ ] lower extremity weakness  [  ]diplopia  [  ]altered mental status   [  ]fevers  [ ]chills [ ]nausea  [ ]vomitting  [  ]dysphagia    [ ]abdominal pain  [ ]melena  [ ]BRBPR    [  ]epistaxis  [  ]rash    [ ]lower extremity edema        [x ] All others negative	  [ ] Unable to obtain    PHYSICAL EXAM:  T(C): 36.4 (10-18-19 @ 04:01), Max: 36.7 (10-17-19 @ 12:13)  HR: 86 (10-18-19 @ 04:01) (76 - 86)  BP: 112/66 (10-18-19 @ 04:01) (98/63 - 112/66)  RR: 18 (10-18-19 @ 04:01) (17 - 18)  SpO2: 97% (10-18-19 @ 04:01) (97% - 97%)  Wt(kg): --    Appearance: Normal	  HEENT:   Normal oral mucosa, PERRL, EOMI	  Lymphatic: No lymphadenopathy , no edema  Cardiovascular: Normal S1 S2, No JVD, No murmurs , Peripheral pulses palpable 2+ bilaterally  Respiratory: Lungs clear to auscultation, normal effort 	  Gastrointestinal:  Soft, Non-tender, + BS	  Skin: No rashes, No ecchymoses, No cyanosis, warm to touch  Musculoskeletal: Normal range of motion, normal strength  Psychiatry:  Mood & affect appropriate      TELEMETRY: 	    ECG:  	    Echo:  NST:  Cath:  	  	  LABS:	 	                          9.2    7.73  )-----------( 256      ( 18 Oct 2019 07:22 )             30.6     10-17    138  |  103  |  55<H>  ----------------------------<  133<H>  3.9   |  21<L>  |  1.83<H>    Ca    9.0      17 Oct 2019 06:22      proBNP:   Lipid Profile:   HgA1c:   TSH:     A/P) She is a 72 y/o female PMH CKD and a severe ischemic cardiomyopathy (CAD s/p CABG) and severe MR (s/p MitraClip) who has refused ICD implantation. She is now admitted with a severe CHF exacerbation requiring IABP support and diuretics. She was admitted on 10/8/19 and transferred out of the CCU on 10/16/19. We are now called to assume cardiac care as her outpatient cardiologist is Dr Quick and Dr Sotelo. She currently appears euvolemic, and denies palpitations/syncope/angina.    -continue current medical therapy for stable CAD with severe LV dysfunction  -remains off beta blockers and ACEi given relative hypotension requiring midodrine  -refusing primary prevention ICD  -f/u CHF team for advanced therapies  -f/u with her cardiologist Katie Quick/Ashleigh on 11/8 at 11:15am        Ayala Cook M.D., [facility]  Cardiac Electrophysiology  [facility]  [address] [place] [address], [place] [address]  [email or web address]    office [phone]  pager [phone]

## 2019-10-18 NOTE — PROGRESS NOTE ADULT - ASSESSMENT
Assessment  DMT2: 73y Female with DM T2 with hyperglycemia, A1C 7.5%, on basal bolus insulin, blood sugars improving, no new hypoglycemia, eating full meals, looks better today, planning d/c home.  SOB: appears improved, on meds, FU Heart Failure team.  CAD: S/P CABG previous admission, on medications, no chest pain, stable, monitored.  Hypothyroidism: On Synthroid 50 mcg po daily, compliant with Synthroid intake, asymptomatic.  HTN: Controlled,  on antihypertensive medications.  CKD: Monitor labs/BMP,           Jillian Banks MD  Cell: [phone]  Office: [phone] Assessment  DMT2: 73y Female with DM T2 with hyperglycemia, A1C 7.5%, on basal bolus insulin, blood sugars improving, no new hypoglycemia,  eating full meals, looks better today, planning d/c home.  SOB: appears improved, on meds, FU Heart Failure team.  CAD: S/P CABG previous admission, on medications, no chest pain, stable, monitored.  Hypothyroidism: On Synthroid 50 mcg po daily, compliant with Synthroid intake, asymptomatic.  HTN: Controlled,  on antihypertensive medications.  CKD: Monitor labs/BMP,           Jillian Banks MD  Cell: [phone]  Office: [phone]

## 2019-10-23 ENCOUNTER — APPOINTMENT (OUTPATIENT)
Dept: CARE COORDINATION | Facility: HOME HEALTH | Age: 74
End: 2019-10-23
Payer: MEDICARE

## 2019-10-23 VITALS
OXYGEN SATURATION: 99 % | TEMPERATURE: 98.2 F | HEART RATE: 69 BPM | WEIGHT: 110 LBS | DIASTOLIC BLOOD PRESSURE: 60 MMHG | RESPIRATION RATE: 18 BRPM | SYSTOLIC BLOOD PRESSURE: 112 MMHG | BODY MASS INDEX: 22.99 KG/M2

## 2019-10-23 DIAGNOSIS — I50.22 CHRONIC SYSTOLIC (CONGESTIVE) HEART FAILURE: ICD-10-CM

## 2019-10-23 PROCEDURE — 99496 TRANSJ CARE MGMT HIGH F2F 7D: CPT

## 2019-10-24 PROBLEM — I50.22 CHRONIC SYSTOLIC HEART FAILURE: Status: ACTIVE | Noted: 2019-09-30

## 2019-10-24 RX ORDER — DOCUSATE SODIUM 100 MG/1
100 CAPSULE ORAL
Refills: 0 | Status: ACTIVE | COMMUNITY

## 2019-10-24 RX ORDER — INSULIN GLARGINE 100 [IU]/ML
100 INJECTION, SOLUTION SUBCUTANEOUS
Refills: 0 | Status: ACTIVE | COMMUNITY

## 2019-10-24 RX ORDER — LORATADINE 10 MG
17 TABLET,DISINTEGRATING ORAL
Refills: 0 | Status: ACTIVE | COMMUNITY

## 2019-10-24 RX ORDER — MONTELUKAST SODIUM 10 MG/1
10 TABLET, FILM COATED ORAL
Qty: 90 | Refills: 2 | Status: DISCONTINUED | COMMUNITY
End: 2019-10-24

## 2019-10-24 RX ORDER — HYDRALAZINE HYDROCHLORIDE 25 MG/1
25 TABLET ORAL 3 TIMES DAILY
Qty: 30 | Refills: 3 | Status: DISCONTINUED | COMMUNITY
Start: 2019-09-30 | End: 2019-10-24

## 2019-10-24 RX ORDER — INSULIN DETEMIR 100 [IU]/ML
100 INJECTION, SOLUTION SUBCUTANEOUS
Refills: 0 | Status: DISCONTINUED | COMMUNITY
Start: 2019-09-30 | End: 2019-10-24

## 2019-10-24 RX ORDER — ATORVASTATIN CALCIUM 40 MG/1
40 TABLET, FILM COATED ORAL
Qty: 1 | Refills: 0 | Status: DISCONTINUED | COMMUNITY
Start: 2018-11-16 | End: 2019-10-24

## 2019-10-24 RX ORDER — METOLAZONE 2.5 MG/1
2.5 TABLET ORAL
Qty: 15 | Refills: 0 | Status: DISCONTINUED | COMMUNITY
Start: 2019-09-30 | End: 2019-10-24

## 2019-10-24 RX ORDER — SODIUM BICARBONATE 650 MG/1
650 TABLET ORAL TWICE DAILY
Refills: 0 | Status: DISCONTINUED | COMMUNITY
Start: 2019-09-30 | End: 2019-10-24

## 2019-10-24 RX ORDER — BUMETANIDE 2 MG/1
2 TABLET ORAL TWICE DAILY
Qty: 150 | Refills: 3 | Status: DISCONTINUED | COMMUNITY
End: 2019-10-24

## 2019-10-24 RX ORDER — MIDODRINE HYDROCHLORIDE 10 MG/1
10 TABLET ORAL 3 TIMES DAILY
Refills: 0 | Status: ACTIVE | COMMUNITY

## 2019-10-24 RX ORDER — ATORVASTATIN CALCIUM 80 MG/1
80 TABLET, FILM COATED ORAL
Refills: 0 | Status: ACTIVE | COMMUNITY

## 2019-10-24 RX ORDER — GLUCOSAMINE HCL/CHONDROITIN SU 500-400 MG
3 CAPSULE ORAL
Refills: 0 | Status: ACTIVE | COMMUNITY

## 2019-10-24 RX ORDER — METOPROLOL TARTRATE 25 MG/1
25 TABLET, FILM COATED ORAL TWICE DAILY
Refills: 0 | Status: ACTIVE | COMMUNITY

## 2019-10-24 RX ORDER — COLCHICINE 0.6 MG/1
0.6 TABLET ORAL DAILY
Refills: 0 | Status: DISCONTINUED | COMMUNITY
Start: 2019-09-30 | End: 2019-10-24

## 2019-10-24 RX ORDER — ISOSORBIDE MONONITRATE 30 MG/1
30 TABLET, EXTENDED RELEASE ORAL DAILY
Refills: 0 | Status: DISCONTINUED | COMMUNITY
Start: 2019-09-30 | End: 2019-10-24

## 2019-10-24 RX ORDER — METOPROLOL SUCCINATE 25 MG/1
25 TABLET, EXTENDED RELEASE ORAL
Refills: 0 | Status: DISCONTINUED | COMMUNITY
Start: 2019-09-30 | End: 2019-10-24

## 2019-10-24 RX ORDER — ALPRAZOLAM 0.25 MG/1
0.25 TABLET ORAL 3 TIMES DAILY
Refills: 0 | Status: ACTIVE | COMMUNITY

## 2019-10-24 NOTE — REVIEW OF SYSTEMS
[Fatigue] : fatigue [Recent Change In Weight] : ~T recent weight change [Anxiety] : anxiety [Paroxysmal Nocturnal Dyspnea] : paroxysmal nocturnal dyspnea [Dyspnea on Exertion] : dyspnea on exertion [Negative] : Heme/Lymph [Chest Pain] : no chest pain

## 2019-10-24 NOTE — ASSESSMENT
[FreeTextEntry1] : 72 y/o Armenian speaking woman with PMHx of CKD, severe ischemic cardiopyopathy, CABG, mitral clip, DM2, Hypothyroid, HTN, HLD with recent admission at [facility] for HF/NSTEMI w/S/P IABP. family declined ICD implantation.\par Patient's dautherElsa present duirng home visit. Assist x 1, patient is dependent with all ADLS. Requires use of wheelchair for transportation.\par hypothyroid-continue with synthroid\par HLD- continue with lipitor\par GOC- discussed with daughter in full detail about patient's condition and patient's wishes. Family states refusal for ICD is because she really does not need it. Discussed DNR/DNI- Elsa declines; feels that her mother will get better. \par Discussed house calls programs with Elsa; declined at this time.\par \par

## 2019-10-24 NOTE — PLAN
[FreeTextEntry1] : -take Bumex 1.5mg PO x 1 stat\par -follow up with Dr. Sotelo on 10/25/19 at 1130 for blood work/assessment.\par -discussed plan with Dr. Sotelo's ACP in the office.\par \par [employer] TCM STARS teaching: Enforced with patient need for daily weights. Advised to call for an increase of greater than 2 lbs. in a day or 5 pounds in a week. Adhere to low salt diet and educated patient on foods that should be avoided such as processed or fast food. Limit fluids to 1 liter a day which is 4-5 glasses. Continue medications as ordered.  Follow up with Cardiologist. Contact information given, patient advised to call with any questions/concerns.

## 2019-10-24 NOTE — PHYSICAL EXAM
[Well Nourished] : well nourished [No Acute Distress] : no acute distress [No JVD] : no jugular venous distention [Normal Appearance] : was normal in appearance [No Respiratory Distress] : no respiratory distress  [No Tracheal Deviation] : the trachea was midline [No Accessory Muscle Use] : no accessory muscle use [Normal Rhythm/Effort] : normal respiratory rhythm and effort [Rales / Crackles Bilateral Midlung Fields] : crackles were heard over both midlung fields [Dry Cough] : a dry cough was heard [Acc Muscles Use] : accessory muscle use was noted [Rales / Crackles Bilateral Bases] : crackles were heard over both bases [Wheezing Bilaterally] : no wheezing was heard [Rhonchi Bilateral] : no rhonchi were heard [Regular Rhythm] : with a regular rhythm [Normal Rate] : normal rate  [Decreased Breath Sounds] : breath sounds were not diminished [No Edema] : there was no peripheral edema [Pedal Pulses Present] : the pedal pulses are present [Soft] : abdomen soft [No Extremity Clubbing/Cyanosis] : no extremity clubbing/cyanosis [Normal Bowel Sounds] : normal bowel sounds [Non Tender] : non-tender [Rounded] : rounded

## 2019-10-24 NOTE — HISTORY OF PRESENT ILLNESS
[FreeTextEntry1] : Follow up for hospitalization: Heart Failure [de-identified] : As per hospital course authored by Beto Goodwin NP on 10/18/19: "72yo F with HTN, HLD, DM2 (A1c 7.5 10/19), CAD s/p CABG (LIMA to LAD, SVG to OM, SVG to PDA) and prior PCI, severe mitral regurgitation s/p mitral clip (6/19), severe pulmonary HTN, CKD IV (b/l Cr 2-2.2), hypothyroidism presented with two days of SOB. In the ED was given asa 162 mg, zosyn x1, vanc 1 g. VS in ED 98 F, BP 86/54, HR 54, RR 48, placed on Bipap 10/5. Pt admitted to CCU for acute decompensated heart failure. She was diuresed and successfully weaned off bipap to room air. Trops were elevated to 1800, pt was started on heparin gtt for NSTEMI. Pt underwent RHC and placement of Shevlin and intra-aortic balloon pump. RHC showed elevated filling pressures and borderline low CI (2.3) with /80s s/p IABP. Was diuresed with improvement in filling pressures but has persistent renal dysfunction. LHC deferred in light of MERVIN on CKD, creatinine in 3 range from baseline of 2. Heart Failure team was consulted. Advised to wean off IABP. On 10/10 pt had two episodes of SVT which resolved s/p 6 mg adenosine IVP. Metoprolol 12.5 mg BID started with good response- no further episodes of SVT. Weaned off of intra-aortic balloon pump. Due to leukocytosis empiric vanc/zosyn were given, switched to cefepime to be completed on 10/14 for 7 day course, cultures negative to date. Became hypotensive to 70s/40s after metoprolol dose was increased to 25 mg, required vasopressin. Lactate was stable and low suspicion for cardiogenic shock. s/p 1 unit prbcs with good response- Hgbs remained in 9 range. 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 Pt was weaned off vaso to midodrine 10 mg TID. She tolerated metoprolol 12.5 mg and was stable for transfer to floors."\par \par SELVIN CLAIRE is being seen after discharge home [facility].  She was admitted on 10/8/2019 for SOB/HF and discharged home on [id number]. Discharge medications were reviewed and reconciled with the current medication list and medications in home. \par \par \par \par

## 2019-11-12 PROCEDURE — 87086 URINE CULTURE/COLONY COUNT: CPT

## 2019-11-12 PROCEDURE — 84156 ASSAY OF PROTEIN URINE: CPT

## 2019-11-12 PROCEDURE — 84443 ASSAY THYROID STIM HORMONE: CPT

## 2019-11-12 PROCEDURE — 85045 AUTOMATED RETICULOCYTE COUNT: CPT

## 2019-11-12 PROCEDURE — 83540 ASSAY OF IRON: CPT

## 2019-11-12 PROCEDURE — 82330 ASSAY OF CALCIUM: CPT

## 2019-11-12 PROCEDURE — P9016: CPT

## 2019-11-12 PROCEDURE — 86592 SYPHILIS TEST NON-TREP QUAL: CPT

## 2019-11-12 PROCEDURE — 82595 ASSAY OF CRYOGLOBULIN: CPT

## 2019-11-12 PROCEDURE — 81001 URINALYSIS AUTO W/SCOPE: CPT

## 2019-11-12 PROCEDURE — 87350 HEPATITIS BE AG IA: CPT

## 2019-11-12 PROCEDURE — 82533 TOTAL CORTISOL: CPT

## 2019-11-12 PROCEDURE — 85730 THROMBOPLASTIN TIME PARTIAL: CPT

## 2019-11-12 PROCEDURE — 84560 ASSAY OF URINE/URIC ACID: CPT

## 2019-11-12 PROCEDURE — 82435 ASSAY OF BLOOD CHLORIDE: CPT

## 2019-11-12 PROCEDURE — 84484 ASSAY OF TROPONIN QUANT: CPT

## 2019-11-12 PROCEDURE — 85027 COMPLETE CBC AUTOMATED: CPT

## 2019-11-12 PROCEDURE — 83036 HEMOGLOBIN GLYCOSYLATED A1C: CPT

## 2019-11-12 PROCEDURE — 86850 RBC ANTIBODY SCREEN: CPT

## 2019-11-12 PROCEDURE — 87340 HEPATITIS B SURFACE AG IA: CPT

## 2019-11-12 PROCEDURE — 93451 RIGHT HEART CATH: CPT

## 2019-11-12 PROCEDURE — 86901 BLOOD TYPING SEROLOGIC RH(D): CPT

## 2019-11-12 PROCEDURE — 82553 CREATINE MB FRACTION: CPT

## 2019-11-12 PROCEDURE — 84300 ASSAY OF URINE SODIUM: CPT

## 2019-11-12 PROCEDURE — 84100 ASSAY OF PHOSPHORUS: CPT

## 2019-11-12 PROCEDURE — 86780 TREPONEMA PALLIDUM: CPT

## 2019-11-12 PROCEDURE — 82570 ASSAY OF URINE CREATININE: CPT

## 2019-11-12 PROCEDURE — 51702 INSERT TEMP BLADDER CATH: CPT

## 2019-11-12 PROCEDURE — 86706 HEP B SURFACE ANTIBODY: CPT

## 2019-11-12 PROCEDURE — 86431 RHEUMATOID FACTOR QUANT: CPT

## 2019-11-12 PROCEDURE — 96375 TX/PRO/DX INJ NEW DRUG ADDON: CPT | Mod: XU

## 2019-11-12 PROCEDURE — 87040 BLOOD CULTURE FOR BACTERIA: CPT

## 2019-11-12 PROCEDURE — 83550 IRON BINDING TEST: CPT

## 2019-11-12 PROCEDURE — 87389 HIV-1 AG W/HIV-1&-2 AB AG IA: CPT

## 2019-11-12 PROCEDURE — 93005 ELECTROCARDIOGRAM TRACING: CPT

## 2019-11-12 PROCEDURE — 82947 ASSAY GLUCOSE BLOOD QUANT: CPT

## 2019-11-12 PROCEDURE — 71045 X-RAY EXAM CHEST 1 VIEW: CPT

## 2019-11-12 PROCEDURE — 82962 GLUCOSE BLOOD TEST: CPT

## 2019-11-12 PROCEDURE — C1889: CPT

## 2019-11-12 PROCEDURE — 80048 BASIC METABOLIC PNL TOTAL CA: CPT

## 2019-11-12 PROCEDURE — 84165 PROTEIN E-PHORESIS SERUM: CPT

## 2019-11-12 PROCEDURE — 84132 ASSAY OF SERUM POTASSIUM: CPT

## 2019-11-12 PROCEDURE — 87449 NOS EACH ORGANISM AG IA: CPT

## 2019-11-12 PROCEDURE — C1894: CPT

## 2019-11-12 PROCEDURE — 84145 PROCALCITONIN (PCT): CPT

## 2019-11-12 PROCEDURE — 82550 ASSAY OF CK (CPK): CPT

## 2019-11-12 PROCEDURE — 80061 LIPID PANEL: CPT

## 2019-11-12 PROCEDURE — 80053 COMPREHEN METABOLIC PANEL: CPT

## 2019-11-12 PROCEDURE — 82728 ASSAY OF FERRITIN: CPT

## 2019-11-12 PROCEDURE — 86803 HEPATITIS C AB TEST: CPT

## 2019-11-12 PROCEDURE — 82565 ASSAY OF CREATININE: CPT

## 2019-11-12 PROCEDURE — 36430 TRANSFUSION BLD/BLD COMPNT: CPT

## 2019-11-12 PROCEDURE — 83615 LACTATE (LD) (LDH) ENZYME: CPT

## 2019-11-12 PROCEDURE — 86900 BLOOD TYPING SEROLOGIC ABO: CPT

## 2019-11-12 PROCEDURE — 97161 PT EVAL LOW COMPLEX 20 MIN: CPT

## 2019-11-12 PROCEDURE — 99291 CRITICAL CARE FIRST HOUR: CPT | Mod: 25

## 2019-11-12 PROCEDURE — 84295 ASSAY OF SERUM SODIUM: CPT

## 2019-11-12 PROCEDURE — 83735 ASSAY OF MAGNESIUM: CPT

## 2019-11-12 PROCEDURE — 86036 ANCA SCREEN EACH ANTIBODY: CPT

## 2019-11-12 PROCEDURE — 85610 PROTHROMBIN TIME: CPT

## 2019-11-12 PROCEDURE — 85014 HEMATOCRIT: CPT

## 2019-11-12 PROCEDURE — 84155 ASSAY OF PROTEIN SERUM: CPT

## 2019-11-12 PROCEDURE — 83935 ASSAY OF URINE OSMOLALITY: CPT

## 2019-11-12 PROCEDURE — 76770 US EXAM ABDO BACK WALL COMP: CPT

## 2019-11-12 PROCEDURE — 83010 ASSAY OF HAPTOGLOBIN QUANT: CPT

## 2019-11-12 PROCEDURE — 33967 INSERT I-AORT PERCUT DEVICE: CPT

## 2019-11-12 PROCEDURE — 86160 COMPLEMENT ANTIGEN: CPT

## 2019-11-12 PROCEDURE — 83605 ASSAY OF LACTIC ACID: CPT

## 2019-11-12 PROCEDURE — 80202 ASSAY OF VANCOMYCIN: CPT

## 2019-11-12 PROCEDURE — 96374 THER/PROPH/DIAG INJ IV PUSH: CPT | Mod: XU

## 2019-11-12 PROCEDURE — C8929: CPT

## 2019-11-12 PROCEDURE — 83880 ASSAY OF NATRIURETIC PEPTIDE: CPT

## 2019-11-12 PROCEDURE — 82803 BLOOD GASES ANY COMBINATION: CPT

## 2019-11-12 PROCEDURE — C1769: CPT

## 2019-11-12 PROCEDURE — 86923 COMPATIBILITY TEST ELECTRIC: CPT

## 2019-11-12 PROCEDURE — 84166 PROTEIN E-PHORESIS/URINE/CSF: CPT

## 2019-11-12 PROCEDURE — 82010 KETONE BODYS QUAN: CPT

## 2019-11-12 PROCEDURE — 86038 ANTINUCLEAR ANTIBODIES: CPT

## 2019-11-12 PROCEDURE — 94660 CPAP INITIATION&MGMT: CPT

## 2019-11-12 PROCEDURE — 86593 SYPHILIS TEST NON-TREP QUANT: CPT

## 2019-11-12 PROCEDURE — 86334 IMMUNOFIX E-PHORESIS SERUM: CPT

## 2019-12-05 NOTE — PROGRESS NOTE ADULT - ASSESSMENT
74 y/o female with PMHx of HTN, DM, CAD s/p CABG (LIMA to LAD, SVG to OM, SVG to PDA at [facility] 2014), HFrEF (LVEF 25%) severe MR, severe pulm HTN, hypothyroidism, admitted to [facility] for SOB, now transferred to [facility] for [facility] Eval    MERVIN  Scr 2.4 on (6/6)   Likely sec to CHF/renal vasocongestion   Renal function fluctuates slightly likely sec to CHF.   Monitor bmp  Diuretics per cardio  IF renal function worsens, would hold off Spirinolactone   Avoid nephrotoxic agents      CKD stage 3  baseline cr 1.5-1.8  likely sec to HTN/DM/chf  Monitor renal function     SOB  likely sec to HF   Monitor daily weight and i/o  Diuretics per cardio  Patient with severe MR await Mitral Clip eval - Follow up CTS. planned for 6/13 eyeglasses/dentures

## 2020-01-03 NOTE — PROGRESS NOTE ADULT - SUBJECTIVE AND OBJECTIVE BOX
Chief complaint  Patient is a 73y old  Female who presents with a chief complaint of sob (09 May 2019 14:13)   Review of systems  Patient in bed, looks comfortable, no fever, had hypoglycemia.    Labs and Fingersticks  CAPILLARY BLOOD GLUCOSE      POCT Blood Glucose.: 174 mg/dL (09 May 2019 13:05)  POCT Blood Glucose.: 169 mg/dL (09 May 2019 08:58)  POCT Blood Glucose.: 159 mg/dL (08 May 2019 22:34)  POCT Blood Glucose.: 101 mg/dL (08 May 2019 18:30)  POCT Blood Glucose.: 84 mg/dL (08 May 2019 18:11)  POCT Blood Glucose.: 61 mg/dL (08 May 2019 17:52)      Anion Gap, Serum: 16 <H> (05-09 @ 06:00)  Anion Gap, Serum: 16 <H> (05-08 @ 23:58)  Anion Gap, Serum: 16 <H> (05-08 @ 07:30)      Calcium, Total Serum: 8.6 (05-09 @ 06:00)  Calcium, Total Serum: 8.3 <L> (05-08 @ 23:58)  Calcium, Total Serum: 9.2 (05-08 @ 07:30)          05-09    134<L>  |  100  |  89<H>  ----------------------------<  179<H>  4.2   |  18<L>  |  2.36<H>    Ca    8.6      09 May 2019 06:00  Mg     2.5     05-09                          7.1    13.67 )-----------( 308      ( 09 May 2019 06:00 )             23.2     Medications  MEDICATIONS  (STANDING):  ALBUTerol/ipratropium for Nebulization 3 milliLiter(s) Nebulizer every 6 hours  aspirin enteric coated 81 milliGRAM(s) Oral daily  atorvastatin 40 milliGRAM(s) Oral at bedtime  buDESOnide 160 MICROgram(s)/formoterol 4.5 MICROgram(s) Inhaler 2 Puff(s) Inhalation two times a day  buMETAnide Injectable 2 milliGRAM(s) IV Push every 8 hours  chlorhexidine 4% Liquid 1 Application(s) Topical <User Schedule>  dextrose 5%. 1000 milliLiter(s) (50 mL/Hr) IV Continuous <Continuous>  dextrose 50% Injectable 12.5 Gram(s) IV Push once  dextrose 50% Injectable 25 Gram(s) IV Push once  dextrose 50% Injectable 25 Gram(s) IV Push once  docusate sodium 100 milliGRAM(s) Oral two times a day  ergocalciferol 11606 Unit(s) Oral <User Schedule>  gabapentin 100 milliGRAM(s) Oral two times a day  heparin  Injectable 5000 Unit(s) SubCutaneous every 8 hours  hydrALAZINE 25 milliGRAM(s) Oral three times a day  influenza   Vaccine 0.5 milliLiter(s) IntraMuscular once  insulin glargine Injectable (LANTUS) 65 Unit(s) SubCutaneous at bedtime  insulin lispro (HumaLOG) corrective regimen sliding scale   SubCutaneous three times a day before meals  insulin lispro (HumaLOG) corrective regimen sliding scale   SubCutaneous at bedtime  insulin lispro Injectable (HumaLOG) 20 Unit(s) SubCutaneous three times a day with meals  isosorbide   dinitrate Tablet (ISORDIL) 10 milliGRAM(s) Oral three times a day  levothyroxine 50 MICROGram(s) Oral daily  metoprolol succinate ER 12.5 milliGRAM(s) Oral daily  montelukast 10 milliGRAM(s) Oral daily  pantoprazole    Tablet 40 milliGRAM(s) Oral before breakfast  polyethylene glycol 3350 17 Gram(s) Oral two times a day  senna 2 Tablet(s) Oral at bedtime  sodium chloride 0.65% Nasal 1 Spray(s) Both Nostrils three times a day  ticagrelor 90 milliGRAM(s) Oral two times a day      Physical Exam  General: Patient comfortable in bed  Vital Signs Last 12 Hrs  T(F): 98.4 (05-09-19 @ 10:59), Max: 98.4 (05-09-19 @ 10:59)  HR: 80 (05-09-19 @ 10:59) (73 - 80)  BP: 99/46 (05-09-19 @ 10:59) (99/46 - 114/48)  BP(mean): --  RR: 22 (05-09-19 @ 10:59) (16 - 22)  SpO2: 99% (05-09-19 @ 10:59) (92% - 100%)  Neck: No palpable thyroid nodules.  CVS: S1S2, No murmurs  Respiratory: No wheezing, no crepitations  GI: Abdomen soft, bowel sounds positive  Musculoskeletal:  edema lower extremities.   Skin: No skin rashes, no ecchymosis    Diagnostics    Thyroid Stimulating Hormone, Serum: AM Sched. Collection: 07-May-2019 06:00 (05-06 @ 10:38)  Free Thyroxine, Serum: AM Sched. Collection: 07-May-2019 06:00 (05-06 @ 10:38) normal

## 2020-05-05 NOTE — PROCEDURE NOTE - NSCHLORHEXIDINEBATH_GEN_A_CORE
----- Message from Paddy Sanches PA-C sent at 5/5/2020  2:11 PM CDT -----  Regarding: Orders  Hi guys,     I have some orders placed for Ms. Hou can someone call her to get those set up.    Thanks,  BM   2% wipes/4% solution

## 2020-05-05 NOTE — PROGRESS NOTE ADULT - ASSESSMENT
----- Message from Tracy Lyles sent at 5/5/2020  2:09 PM CDT -----  PRIOR AUTHORIZATION, 5/4/20  
----- Message from Tracy Lyles sent at 5/5/2020  2:09 PM CDT -----  PRIOR AUTHORIZATION, 5/4/20  
The following medication needs a prior authorization:     Medication Name: quetiapine     Dosage: 50 mg    Frequency: one tab daily     Directions for use:  One tab daily     Diagnosis: recurrent major depression in remission F33.40    Is the request for a reauthorization? Yes     Is the patient currently stable on therapy? Yes     Please list all therapeutic alternatives previously used with start/end dates and outcome:                
This was done yesterday   
71 y/o F with PMH of CHF(with EF of 37%), CAD(s/p CABG), DM, HLD, Hypothyroidism, GERD, CKD stage 3 presents with 2 days of worsening shortness of breath and ZEE.
73 y/o F with PMH of CHF(with EF of 37%), CAD(s/p CABG), DM, HLD, Hypothyroidism, GERD, CKD stage 3 presents with 2 days of worsening shortness of breath and ZEE.
Assessment  DMT2: 72y Female with DM T2 with hyperglycemia on basal bolus insulin, blood sugars improving  no new hypoglycemia,  eating meals,  non compliant with low carb diet.  CAD: On medications, stable, monitored.  Hypothyroidism:  On synthroid 50 mcg po daily, asymptomatic.  HTN: Controlled, On med.  CKD: Monitor labs/BMP,
Assessment  DMT2: 72y Female with DM T2 with hyperglycemia on basal bolus insulin, blood sugars running high,  no new hypoglycemia,  eating meals,  non compliant with low carb diet.  CAD: On medications, stable, monitored.  Hypothyroidism:  On synthroid 50 mcg po daily, asymptomatic.  HTN: Controlled, On med.  CKD: Monitor labs/BMP,
Assessment  DMT2: 72y Female with DM T2 with hyperglycemia on basal bolus insulin, blood sugars still running high,  no new hypoglycemia,  eating meals,  non compliant with low carb diet.  CAD: On medications, stable, monitored.  Hypothyroidism:  On synthroid 50 mcg po daily, asymptomatic.  HTN: Controlled, On med.  CKD: Monitor labs/BMP,
Assessment  DMT2: 72y Female with DM T2 with hyperglycemia on insulin, blood sugars fluctuating, no new hypoglycemia,  eating meals,  non compliant with low carb diet.  CAD: On medications, stable, monitored.  Hypothyroidism:  On synthroid 50 mcg po daily, asymptomatic.  HTN: Controlled, On med.  CKD: Monitor labs/BMP,
Assessment  DMT2: 72y Female with DM T2 with hyperglycemia on insulin, blood sugars running high,  no new hypoglycemia,  eating meals,  non compliant with low carb diet.  CAD: On medications, stable, monitored.  Hypothyroidism:  On synthroid 50 mcg po daily, asymptomatic.  HTN: Controlled, On med.  CKD: Monitor labs/BMP,
Assessment  DMT2: 72y Female with DM T2 with hyperglycemia on insulin, blood sugars trending down, no new hypoglycemia,  eating meals,  non compliant with low carb diet.  CAD: On medications, stable, monitored.  Hypothyroidism:  On synthroid 50 mcg po daily, asymptomatic.  HTN: Controlled, On med.  CKD: Monitor labs/BMP,
Assessment  DMT2: 72y Female with DM T2 with hyperglycemia on insulin, blood sugars trending up, no new hypoglycemia,  eating meals,  non compliant with low carb diet.  CAD: On medications, stable, monitored.  Hypothyroidism:  On synthroid 50 mcg po daily, asymptomatic.  HTN: Controlled, On med.  CKD: Monitor labs/BMP,
Assessment  DMT2: 72y Female with DM T2 with hyperglycemia on insulin, blood sugars trendingup, no new hypoglycemia,  eating meals,  non compliant with low carb diet.  CAD: On medications, stable, monitored.  Hypothyroidism:  On synthroid 50 mcg po daily, asymptomatic.  HTN: Controlled, On med.  CKD: Monitor labs/BMP,
73 y/o F with PMH of CHF(with EF of 37%), CAD(s/p CABG), DM, HLD, Hypothyroidism, GERD, CKD stage 3 presents with 2 days of worsening shortness of breath and ZEE.

## 2020-05-26 NOTE — PROVIDER CONTACT NOTE (OTHER) - ACTION/TREATMENT ORDERED:
Pharmacy requesting refill: Simvastatin, Doxazosin, Hydralazine, Potassium  Last OV: 10/25/19, telephone call on 4/22/20  Next OV: none  Last refill: 3/18/20  Last labs: 2/7/20  Refilled.    PA discontinued the heparin drip due to high APTT. PA discontinued the heparin drip due to high APTT. Recheck APTT in the morning labs.

## 2020-06-08 NOTE — H&P ADULT - NSHPPOAURINARYCATHETER_GEN_ALL_CORE
Anesthesia Evaluation     Patient summary reviewed and Nursing notes reviewed   history of anesthetic complications: PONV  NPO Solid Status: > 8 hours  NPO Liquid Status: > 2 hours           Airway   Mallampati: II  TM distance: >3 FB  Neck ROM: full  No difficulty expected  Dental      Pulmonary    (+) pulmonary embolism,   (-) COPD, asthma, shortness of breath, recent URI, not a smoker  Cardiovascular   Exercise tolerance: good (4-7 METS)    ECG reviewed    (+) hypertension, past MI , CAD, cardiac stents (2005 no cards follow up ) more than 12 months ago DVT resolved,   (-) dysrhythmias, angina    ROS comment: Normal sinus rhythm  Low voltage QRS    Neuro/Psych  (+) CVA,     (-) seizures  GI/Hepatic/Renal/Endo    (+)  GERD,  renal disease (elevated creatnine ) CRI,   (-) liver disease, diabetes, no thyroid disorder    Musculoskeletal     Abdominal    Substance History      OB/GYN          Other      history of cancer (melanoma breast )    ROS/Med Hx Other: creatnine elevated 1.3   ( unknown to pt )  Active lady s cardiac symptoms   Clotting d/o : No known clotting d/o despite mother having had clots as well   ASA stopped per surgeon                 Anesthesia Plan    ASA 3     general with block   (PECS I and II   Propofol Infusion as part of Anti PONV tech )  intravenous induction     Anesthetic plan, all risks, benefits, and alternatives have been provided, discussed and informed consent has been obtained with: patient.    Plan discussed with CRNA.       no

## 2020-07-25 NOTE — CONSULT NOTE ADULT - SUBJECTIVE AND OBJECTIVE BOX
Dr. 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 Patient was hypoxic got admitted to MICU being treated for CHF and pneumonia        Allergies:  No Known Allergies      PAST MEDICAL & SURGICAL HISTORY:  GERD (gastroesophageal reflux disease)  CAD (coronary artery disease): s/p CABG  Hypothyroidism  Hyperlipidemia  Diabetes mellitus, type 2  S/P CABG (coronary artery bypass graft)      Home Medications Reviewed    Hospital Medications:   MEDICATIONS  (STANDING):  azithromycin  IVPB      heparin  Injectable 5000 Unit(s) SubCutaneous every 8 hours  insulin glargine Injectable (LANTUS) 20 Unit(s) SubCutaneous every morning  insulin glargine Injectable (LANTUS) 60 Unit(s) SubCutaneous at bedtime  insulin lispro (HumaLOG) corrective regimen sliding scale   SubCutaneous every 6 hours      SOCIAL HISTORY:  Denies ETOh, Smoking,     FAMILY HISTORY:  Family history of hypertension (Sibling): sister  Family history of diabetes mellitus (Sibling): brothers/sisters      REVIEW OF SYSTEMS:  CONSTITUTIONAL: No weakness, fevers or chills  EYES/ENT: No visual changes;  No vertigo or throat pain   NECK: No pain or stiffness  RESPIRATORY: as per HPI  CARDIOVASCULAR: No chest pain or palpitations.  GASTROINTESTINAL: No abdominal or epigastric pain. No nausea, vomiting, or hematemesis; No diarrhea or constipation. No melena or hematochezia.  GENITOURINARY: No dysuria, frequency, foamy urine, urinary urgency, incontinence or hematuria  NEUROLOGICAL: No numbness or weakness  SKIN: No itching, burning, rashes, or lesions   VASCULAR: No bilateral lower extremity edema.   All other review of systems is negative unless indicated above.    VITALS:  T(F): 98.3 (18 @ 16:00), Max: 100.1 (18 @ 04:55)  HR: 86 (18 @ 17:00)  BP: 105/65 (18 @ 17:00)  RR: 33 (18 @ 17:00)  SpO2: 97% (18 @ 17:00)  Wt(kg): --     @ 07:01  -   @ 07:00  --------------------------------------------------------  IN: 0 mL / OUT: 220 mL / NET: -220 mL     @ 07:01  -   @ 17:23  --------------------------------------------------------  IN: 0 mL / OUT: 1135 mL / NET: -1135 mL      Height (cm): 162.56 ( @ 04:55)  Weight (kg): 57.6 ( @ 04:55)  BMI (kg/m2): 21.8 ( @ 04:55)  BSA (m2): 1.61 ( @ 04:55)    PHYSICAL EXAM:  Constitutional: NAD  HEENT: anicteric sclera, oropharynx clear, MMM  Neck: No JVD  Respiratory: bilateral basal crackles+  Cardiovascular: S1, S2, RRR  Gastrointestinal: BS+, soft, NT/ND  Extremities: No cyanosis or clubbing. + peripheral edema  Neurological: A/O x 3, no focal deficits  Psychiatric: Normal mood, normal affect  : No CVA tenderness. No cuellar.   Skin: No rashes       LABS:      140  |  102  |  31<H>  ----------------------------<  183<H>  4.0   |  21<L>  |  1.67<H>    Ca    8.9      01 Sep 2018 09:30  Phos  2.7       Mg     1.9         TPro  7.7  /  Alb  3.9  /  TBili  0.5  /  DBili      /  AST  22  /  ALT  16  /  AlkPhos  63      Creatinine Trend: 1.67 <--, 1.66 <--                        14.2   21.76 )-----------( 354      ( 01 Sep 2018 02:00 )             45.3     Urine Studies:  Urinalysis Basic - ( 01 Sep 2018 04:29 )    Color: YELLOW / Appearance: CLEAR / S.015 / pH: 6.5  Gluc: 250 / Ketone: NEGATIVE  / Bili: NEGATIVE / Urobili: NORMAL   Blood: MODERATE / Protein: MODERATE / Nitrite: NEGATIVE   Leuk Esterase: NEGATIVE / RBC: 5-10 / WBC 2-5   Sq Epi: OCC. / Non Sq Epi:  / Bacteria:           RADIOLOGY & ADDITIONAL STUDIES:
HPI:    72y Female w/ PMH HTN, CKD, Hyperlipidemia, DM-II, CAD s/p 3V CABG  (LIMA to LAD, SVG to OM, SVG to PDA) at [facility] 2014, s/p CORNELIA TO RI 11/17/17, NSTEMI 12/2017 s/p [facility] on 12/5 and CORNELIA to [facility], TTE at that time revealed grossly moderate to severe LV dysfunction with hypokinesis of the inferior, lateral and inferolateral walls with an EF of 36% s/p [facility] on 2/6  with no new OBS CAD, managed medically, admitted to MICU with SOB, Cough, Pulm edema and ?PNA.    Daughter reported that Pt was in her usual state of health up until day PTA. Daughter additionally reported that the Pt became "congested" with labored breathing. Pt reported that she not experience any N/V/D, fever, cough, CP, Abdominal pain, loose stools, leg swelling, or rash. Pt additionally reported no recent travel or sick contacts. Pt reported that she take meds as prescribed including diuretics (Lasix 40qd).       PAST MEDICAL & SURGICAL HISTORY:  GERD (gastroesophageal reflux disease)  CAD (coronary artery disease): s/p CABG  Hypothyroidism  Hyperlipidemia  Diabetes mellitus, type 2  S/P CABG (coronary artery bypass graft)      MEDICATIONS  (STANDING):  aspirin  chewable 81 milliGRAM(s) Oral daily  atorvastatin 40 milliGRAM(s) Oral at bedtime  cefTRIAXone   IVPB 1 Gram(s) IV Intermittent every 24 hours  heparin  Injectable 5000 Unit(s) SubCutaneous every 8 hours  insulin glargine Injectable (LANTUS) 20 Unit(s) SubCutaneous every morning  insulin glargine Injectable (LANTUS) 60 Unit(s) SubCutaneous at bedtime  insulin lispro (HumaLOG) corrective regimen sliding scale   SubCutaneous three times a day with meals  levothyroxine 50 MICROGram(s) Oral daily  metoprolol tartrate 12.5 milliGRAM(s) Oral two times a day  potassium acid phosphate/sodium acid phosphate tablet (K-PHOS No. 2) 1 Tablet(s) Oral four times a day with meals  ticagrelor 90 milliGRAM(s) Oral two times a day      Allergies  No Known Allergies    FAMILY HISTORY:  Family history of hypertension (Sibling): sister  Family history of diabetes mellitus (Sibling): brothers/sisters  Noncontributory for premature coronary disease or sudden cardiac death    SOCIAL HISTORY:    [x ] Non-smoker  [ ] Smoker  [ ] Alcohol      REVIEW OF SYSTEMS:  [ ]chest pain  [ x ]shortness of breath  [  ]palpitations  [  ]syncope  [ ]near syncope [ ]upper extremity weakness   [ ] lower extremity weakness  [  ]diplopia  [  ]altered mental status   [  ]fevers  [ ]chills [ ]nausea  [ ]vomitting  [  ]dysphagia    [ ]abdominal pain  [ ]melena  [ ]BRBPR    [  ]epistaxis  [  ]rash  [ ]lower extremity edema      [x ] All others negative	  [ ] Unable to obtain    PHYSICAL EXAM:  T(C): 35.6 (09-02-18 @ 12:00), Max: 36.8 (09-01-18 @ 16:00)  HR: 83 (09-02-18 @ 14:49) (77 - 90)  BP: 119/57 (09-02-18 @ 14:00) (100/56 - 136/70)  RR: 25 (09-02-18 @ 14:49) (20 - 37)  SpO2: 99% (09-02-18 @ 14:49) (94% - 100%)  	  Lymphatic: No lymphadenopathy , no edema  Cardiovascular: Normal S1 S2, No JVD, No murmurs  Respiratory: Bibasilar crackles 1/2 up	  Gastrointestinal:  Soft, Non-tender, + BS	  Skin: No rashes, No ecchymoses, No cyanosis, warm to touch  Psychiatry:  Mood & affect appropriate    DIAGNOSTIC DATA:    EKG- ST, Septal infarct, lateral ST depression, IVCD, QRS 116ms, IVCD new comp to prior    < from: Cardiac Cath Lab - Adult (02.06.18 @ 16:26) >  VENTRICLES: No left ventriculogram was performed.  CORONARY VESSELS: The coronary circulation is right dominant.  LM:   --  LM:Angiography showed mild atherosclerosis with no flow limiting  lesions.  LAD:   --  Proximal LAD: There was a diffuse 90 % stenosis at a site with  no prior intervention. The lesion was eccentric. There was a large  vascular territory distal to the lesion and good blood supply to the  distal myocardium from a graft.  CX:   --  Proximal circumflex: There was a diffuse 40 % stenosis at a site  with no prior intervention. The lesion was eccentric. There was PARUL grade  3 flow through the vessel (brisk flow).  RI:   --  Proximal ramus intermedius: There was a diffuse 10 % stenosis at  the site of a prior stent. The lesion was smoothly contoured. There was  PARUL grade 3 flow through the vessel (brisk flow).  RCA:   --  Mid RCA: There was a 100 % stenosis. There was a moderate-sized  vascular territory distal to the lesion and poor collateral blood supply  to the distal myocardium. This lesion is a chronic total occlusion.  GRAFTS:   --  Graft to the LAD: The graft was a LIMA. Graft angiography  showed no degeneration. Distal vessel angiography showed mild diffuse  disease.  COMPLICATIONS: There were no complications. No complications occurred  during the cath lab visit.  DIAGNOSTIC IMPRESSIONS: Patent GOMES to LAD  Patent stent ramus   of RCA , collateralized from LAD  DIAGNOSTIC RECOMMENDATIONS: Medical therapy  INTERVENTIONAL IMPRESSIONS: Patent GOMES to LAD  Patent stent ramus   of RCA , collateralized from LAD  INTERVENTIONAL RECOMMENDATIONS: Medical therapy  Prepared and signed by  Gretchen Guerra M.D.  Signed 02/15/2018 19:03:05    < end of copied text >      < from: Cardiac Cath Lab - Adult (12.05.17 @ 12:48) >  VENTRICLES: No LV gram was performed; however, a recent echocardiogram  demonstrated an EF of 36 %.  CORONARY VESSELS: The coronary circulation is right dominant.  LM:   --  Distal left main: Angiography showed minor luminal irregularities  with no flow limiting lesions.  LAD:   --  Mid LAD: There was a 100 % stenosis with the distal vessel being  filled via LIMA-LAD.  CX:   --  Proximal circumflex: There was a tubular 20 % stenosis.  --  Mid circumflex: Angiography showed minor luminal irregularities with no  flow limiting lesions.  --  Distal circumflex: Angiography showed minor luminal irregularities with  no flow limiting lesions.  --  OM1: The vessel was small sized. There was a discrete 60 % stenosis.  RI:   --  Ostial ramus intermedius: Angiography showed minor luminal  irregularities with no flow limiting lesions. There was no significant  restenosis in RI stent.  --  Proximal ramus intermedius: Angiography showed minor luminal  irregularities with no flow limiting lesions.  --  Mid ramus intermedius: There was a tubular 80 % stenosis. The lesion  was associated with a small filling defect consistent with thrombus.  RCA:   --  Mid RCA: There was a 100 % stenosis with the distal vessel being  filled via collaterals from the LAD.  GRAFTS:   --  Graft to the LAD: The graft was a LIMA. Graft angiography  showed minor luminal irregularities. Distal vessel angiography showed  minor luminalirregularities.  COMPLICATIONS: There were no complications.  DIAGNOSTIC RECOMMENDATIONS: Coronary angiogram demonstrates a severe  stenosis in the ramus intermedius that is the cause of the patient's  clinical presentation. Will therefore perform PCI to the RI.  INTERVENTIONAL RECOMMENDATIONS: S/p successful CORNELIA to the Ramus  Intermedius. The patient should continue with ASA and Brilinta for at  least 12 months.  Prepared and signed by  Corie Ga M.D.  Signed 12/06/2017 17:06:30    < from: TTE with Doppler (w/Cont) (01.23.18 @ 12:31) >  CONCLUSIONS:  1. Mitral annular calcification, otherwise normal mitral  valve. Mild-moderate mitral regurgitation.  2. Calcified trileaflet aortic valve with normal opening.  Mild aortic regurgitation.  3. Normal left ventricular internal dimensions and wall  thicknesses.  4. Endocardium not well visualized; grossly severe global  left ventricular systolic dysfunction.  Endocardial  visualization enhanced with intravenous injection of echo  contrast (Definity).  5. The right ventricle is not well visualized; grossly  normal right ventricular systolic function.  *** Compared with echocardiogram of 11/25/2017, no  significant changes noted.  ------------------------------------------------------------------------  Confirmed on  1/23/2018 - 14:35:49 by Jose Lucia M.D.    < end of copied text >    < from: Xray Chest 1 View-PORTABLE IMMEDIATE (09.01.18 @ 02:18) >    IMPRESSION:   Bilateral patchy airspace opacity, possibly pulmonary edema or multifocal   pneumonia.    < end of copied text >    	  LABS:	 	                        11.5   11.21 )-----------( 261      ( 02 Sep 2018 03:45 )             34.4    141  |  103  |  31<H>  ----------------------------<  170<H>  3.5   |  21<L>  |  1.60<H>    Ca    8.7      02 Sep 2018 03:45  Phos  2.4     09-02  Mg     2.0     09-02    TPro  7.1  /  Alb  3.4  /  TBili  0.4  /  DBili  x   /  AST  67<H>  /  ALT  19  /  AlkPhos  49  09-02   HgA1c: Hemoglobin A1C, Whole Blood: 7.4 % (09-02 @ 02:30)    Troponin T, High Sensitivity: 52: Delta: 55 on 09/01/  Troponin T, High Sensitivity Result (09.01.18 @ 09:30)    Troponin T, High Sensitivity: 55: Delta: 33 on 09/01/  Troponin T, High Sensitivity (09.01.18 @ 03:10)    Troponin T, High Sensitivity: 33  CKMB (09.01.18 @ 02:00)    CKMB: 4.51 ng/mL  Creatine Kinase, Serum (09.01.18 @ 02:00)    Creatine Kinase, Serum: 193        ASSESSMENT/PLAN:   2y Female w/ PMH HTN, CKD, Hyperlipidemia, DM-II, CAD s/p 3V CABG  (LIMA to LAD, SVG to OM, SVG to PDA) at [facility] 2014, s/p CORNEILA TO RI 11/17/17, NSTEMI 12/2017 s/p [facility] on 12/5 and CORNELIA to [facility], TTE at that time revealed grossly moderate to severe LV dysfunction with hypokinesis of the inferior, lateral and inferolateral walls with an EF of 36% s/p C on 2/6  with no new OBS CAD, managed medically, admitted to MICU with SOB, Cough, Pulm edema, acute on chronic systolic HF and ?PNA. C/O chest pressure today.       --Cont to trend CE incl CPK and Trop T  --IV Lasix to keep O>I  --Abx per primary team  --Repeat TTE  --Telemetry  --Repeat EKG with chest pain    will d/w attending
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 Daughter at beside, and provided details of history. Daughter reported that Pt was in her usual state of health up until this past even when she started to feel short of breath. Daughter additionally reported that the Pt became "congested" with labored breathing. Pt reported that she not experience any N/V/D, fever, cough, CP, Abdominal pain, loose stools, leg swelling,  or rash. Pt additionally reported no recent travel or sick contacts. Pt reported that she take meds as prescribed including diuretics (Lasix 40qd).   Patient has history of diabetes, on insulin at home, no recent hypoglycemic episodes, no polyuria polydipsia. Patient follows up with PCP for diabetes management.  In the ED:   Vital Signs Last 24 Hrs  T(C): 37.7 (01 Sep 2018 02:53), Max: 37.7 (01 Sep 2018 02:53)  T(F): 99.8 (01 Sep 2018 02:53), Max: 99.8 (01 Sep 2018 02:53)  HR: 94 (01 Sep 2018 04:07) (87 - 120)  BP: 121/61 (01 Sep 2018 03:54) (106/58 - 195/83)  BP(mean): --  RR: 29 (01 Sep 2018 03:54) (29 - 48)  SpO2: 100% (01 Sep 2018 04:07) (85% - 100%)    Pt, hypoxic to 85%, placed on nonrebreather, tachy to 120s, profuse b/l wheeze. CCU CONSULTED however did not feel Pt presentation was not consistent with heart failure. Pt was placed on BiPAP for increased work of breathing, however remained tachypneic to 38. Pt reported improvement in symptoms however developed dry cough and chest pain. Pt was given 80 mg lasix, 04. sl nitro x2, aspirin 325mg, azithromycin, ceftriaxone, hydralazine 10mg.                14.2                 139  | 17   | 28           21.76 >-----------< 354     ------------------------< 336                   45.3                 3.6  | 99   | 1.66                                         Ca 9.2   Mg 2.1   Ph 5.7      03:10 - VBG - pH: 7.31  | pCO2: 44    | pO2: 55    | Lactate: 2.8    02:00 - VBG - pH: 7.13  | pCO2: 59    | pO2: 56    | Lactate: 5.6    HS troponin 27 lactate 5.6    POC U/S showed diffuse b lines, no pleural effusions b/l. changes consistent with atelectasis. Heart showed severe LV systolic dysfunction w/ poor squeeze.     < from: Xray Chest 1 View-PORTABLE IMMEDIATE (09.01.18 @ 02:18) >  Preliminary INTERPRETATION:  Favorpulmonary edema over multifocal pneumonia    < end of copied text >    Of note, Pt last hospitalized in February 2018: (01 Sep 2018 04:22)      PAST MEDICAL & SURGICAL HISTORY:  GERD (gastroesophageal reflux disease)  CAD (coronary artery disease): s/p CABG  Hypothyroidism  Hyperlipidemia  Diabetes mellitus, type 2  S/P CABG (coronary artery bypass graft)      FAMILY HISTORY:  Family history of hypertension (Sibling): sister  Family history of diabetes mellitus (Sibling): brothers/sisters      Social History:    Outpatient Medications:    MEDICATIONS  (STANDING):  aspirin  chewable 81 milliGRAM(s) Oral daily  atorvastatin 40 milliGRAM(s) Oral at bedtime  heparin  Injectable 5000 Unit(s) SubCutaneous every 8 hours  insulin glargine Injectable (LANTUS) 66 Unit(s) SubCutaneous at bedtime  insulin glargine Injectable (LANTUS) 24 Unit(s) SubCutaneous every morning  insulin lispro (HumaLOG) corrective regimen sliding scale   SubCutaneous at bedtime  insulin lispro (HumaLOG) corrective regimen sliding scale   SubCutaneous three times a day with meals  levothyroxine 50 MICROGram(s) Oral daily  metoprolol tartrate 12.5 milliGRAM(s) Oral two times a day  ticagrelor 90 milliGRAM(s) Oral two times a day    MEDICATIONS  (PRN):  acetaminophen   Tablet .. 650 milliGRAM(s) Oral every 6 hours PRN Mild Pain (1 - 3), Moderate Pain (4 - 6)  aluminum hydroxide/magnesium hydroxide/simethicone Suspension 30 milliLiter(s) Oral every 4 hours PRN Dyspepsia      Allergies    No Known Allergies    Intolerances      Review of Systems:  Constitutional: No fever, no chills  Eyes: No blurry vision  Neuro: No tremors  HEENT: No pain, no neck swelling  Cardiovascular: No chest pain, no palpitations  Respiratory: Has SOB, no cough  GI: No nausea, vomiting, abdominal pain  : No dysuria  Skin: no rash  MSK: Has leg swelling.  Psych: no depression  Endocrine: no polyuria, polydipsia    ALL OTHER SYSTEMS REVIEWED AND NEGATIVE    UNABLE TO OBTAIN    PHYSICAL EXAM:  VITALS: T(C): 36.4 (09-08-18 @ 12:05)  T(F): 97.6 (09-08-18 @ 12:05), Max: 97.9 (09-07-18 @ 21:24)  HR: 72 (09-08-18 @ 12:05) (72 - 75)  BP: 106/62 (09-08-18 @ 12:05) (106/62 - 127/69)  RR:  (16 - 16)  SpO2:  (98% - 99%)  Wt(kg): --  GENERAL: NAD, well-groomed, well-developed  EYES: No proptosis, no lid lag  HEENT:  Atraumatic, Normocephalic  THYROID: Normal size, no palpable nodules  RESPIRATORY: Clear to auscultation bilaterally; No rales, rhonchi, wheezing  CARDIOVASCULAR: Si S2, No murmurs;  GI: Soft, non distended, normal bowel sounds  SKIN: Dry, intact, No rashes or lesions  MUSCULOSKELETAL: Has BL lower extremity edema.  NEURO:  no tremor, sensation decreased in feet BL,    POCT Blood Glucose.: 331 mg/dL (09-08-18 @ 12:51)  POCT Blood Glucose.: 220 mg/dL (09-08-18 @ 08:35)  POCT Blood Glucose.: 363 mg/dL (09-07-18 @ 21:50)  POCT Blood Glucose.: 308 mg/dL (09-07-18 @ 17:34)  POCT Blood Glucose.: 422 mg/dL (09-07-18 @ 12:36)  POCT Blood Glucose.: 225 mg/dL (09-07-18 @ 08:57)  POCT Blood Glucose.: 365 mg/dL (09-06-18 @ 21:49)  POCT Blood Glucose.: 316 mg/dL (09-06-18 @ 17:33)  POCT Blood Glucose.: 269 mg/dL (09-06-18 @ 13:11)  POCT Blood Glucose.: 185 mg/dL (09-06-18 @ 08:56)  POCT Blood Glucose.: 347 mg/dL (09-05-18 @ 21:56)  POCT Blood Glucose.: 178 mg/dL (09-05-18 @ 18:43)                            11.4   10.11 )-----------( 290      ( 08 Sep 2018 03:15 )             34.9       09-08    133<L>  |  95<L>  |  59<H>  ----------------------------<  295<H>  4.5   |  20<L>  |  2.13<H>    EGFR if : 27  EGFR if non : 23    Ca    10.1      09-08  Mg     2.2     09-08        Thyroid Function Tests:      Hemoglobin A1C, Whole Blood: 7.4 % <H> [4.0 - 5.6] (09-02-18 @ 02:30)          Radiology:
Spontaneous, unlabored and symmetrical
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
## 2020-11-11 NOTE — PROGRESS NOTE ADULT - ASSESSMENT
History & Physical Examination    Patient Name: Corine De Anda  MRN: [id number]  [facility]: [id number]  YOB: 1995    Pre-Operative Diagnosis:  Lumbar radiculitis [M54.16]    Present Illness: 21-year-old male patient with chronic low back pain f 71 Female, with a PmHx of HTN, CKD, Hyperlipidemia, DM-II, CAD s/p CABG x 3, s/p stents, presenting to the [facility] ED c/o shortness of breath with HF with fever and URI symptoms

## 2020-11-24 NOTE — H&P ADULT - ATTENDING COMMENTS
Blood drawn per order. Needle size: 23 g butterfly  Site: right top of hand . First attempt successful Yes    Second attempt na    Pressure applied until bleeding stopped. Cotton ball and band aid  applied. Patient informed to call office or return if bleeding reoccurs and unable to stop.     Tubes drawn: 1 purple     2 red
Patient seen and examined.  Agree with above.   -Admitted with sob/chf exacerbation  -Continue with IV lasix to keep O > I  -Continue with asa and brilinta for recent darrian  -trend cardiac enzymes  -f/u renal    Corie Ga MD  [facility]  [address] [place] [address], [id number]  [place] [address]  office: [phone]  pager: [phone]

## 2020-11-28 NOTE — PROGRESS NOTE ADULT - PROBLEM SELECTOR PROBLEM 4
Your chest x-ray did not reveal any evidence of pneumonia or consolidation.  Your COVID and influenza test were both negative.  You are instructed to rest, drink plenty of clear fluid and alternate Tylenol/Motrin as needed for fever.  You are instructed to return to the emergency department immediately for any new or worsening symptoms.   Acute on chronic systolic heart failure

## 2020-11-30 NOTE — ED PROVIDER NOTE - ENMT, MLM
Allergies, Adult  An allergy is when your body's defense system (immune system) overreacts to an otherwise harmless substance (allergen) that you breathe in or eat or something that touches your skin. When you come into contact with something that you are allergic to, your immune system produces certain proteins (antibodies). These proteins cause cells to release chemicals (histamines) that trigger the symptoms of an allergic reaction.  Allergies often affect the nasal passages (allergic rhinitis), eyes (allergic conjunctivitis), skin (atopic dermatitis), and stomach. Allergies can be mild or severe. Allergies cannot spread from person to person (are not contagious). They can develop at any age and may be outgrown.  What increases the risk?  You may be at greater risk of allergies if other people in your family have allergies.  What are the signs or symptoms?  Symptoms depend on what type of allergy you have. They may include:  · Runny, stuffy nose.  · Sneezing.  · Itchy mouth, ears, or throat.  · Postnasal drip.  · Sore throat.  · Itchy, red, watery, or puffy eyes.  · Skin rash or hives.  · Stomach pain.  · Vomiting.  · Diarrhea.  · Bloating.  · Wheezing or coughing.  People with a severe allergy to food, medicine, or an insect bite may have a life-threatening allergic reaction (anaphylaxis). Symptoms of anaphylaxis include:  · Hives.  · Itching.  · Flushed face.  · Swollen lips, tongue, or mouth.  · Tight or swollen throat.  · Chest pain or tightness in the chest.  · Trouble breathing or shortness of breath.  · Rapid heartbeat.  · Dizziness or fainting.  · Vomiting.  · Diarrhea.  · Pain in the abdomen.  How is this diagnosed?  This condition is diagnosed based on:  · Your symptoms.  · Your family and medical history.  · A physical exam.  You may need to see a health care provider who specializes in treating allergies (allergist). You may also have tests, including:  · Skin tests to see which allergens are causing  your symptoms, such as:  ? Skin prick test. In this test, your skin is pricked with a tiny needle and exposed to small amounts of possible allergens to see if your skin reacts.  ? Intradermal skin test. In this test, a small amount of allergen is injected under your skin to see if your skin reacts.  ? Patch test. In this test, a small amount of allergen is placed on your skin and then your skin is covered with a bandage. Your health care provider will check your skin after a couple of days to see if a rash has developed.  · Blood tests.  · Challenges tests. In this test, you inhale a small amount of allergen by mouth to see if you have an allergic reaction.  You may also be asked to:  · Keep a food diary. A food diary is a record of all the foods and drinks you have in a day and any symptoms you experience.  · Practice an elimination diet. An elimination diet involves eliminating specific foods from your diet and then adding them back in one by one to find out if a certain food causes an allergic reaction.  How is this treated?  Treatment for allergies depends on your symptoms. Treatment may include:  · Cold compresses to soothe itching and swelling.  · Eye drops.  · Nasal sprays.  · Using a saline spray or container (neti pot) to flush out the nose (nasal irrigation). These methods can help clear away mucus and keep the nasal passages moist.  · Using a humidifier.  · Oral antihistamines or other medicines to block allergic reaction and inflammation.  · Skin creams to treat rashes or itching.  · Diet changes to eliminate food allergy triggers.  · Repeated exposure to tiny amounts of allergens to build up a tolerance and prevent future allergic reactions (immunotherapy). These include:  ? Allergy shots.  ? Oral treatment. This involves taking small doses of an allergen under the tongue (sublingual immunotherapy).  · Emergency epinephrine injection (auto-injector) in case of an allergic emergency. This is a  self-injectable, pre-measured medicine that must be given within the first few minutes of a serious allergic reaction.  Follow these instructions at home:              · Avoid known allergens whenever possible.  · If you suffer from airborne allergens, wash out your nose daily. You can do this with a saline spray or a neti pot to flush out your nose (nasal irrigation).  · Take over-the-counter and prescription medicines only as told by your health care provider.  · Keep all follow-up visits as told by your health care provider. This is important.  · If you are at risk of a severe allergic reaction (anaphylaxis), keep your auto-injector with you at all times.  · If you have ever had anaphylaxis, wear a medical alert bracelet or necklace that states you have a severe allergy.  Contact a health care provider if:  · Your symptoms do not improve with treatment.  Get help right away if:  · You have symptoms of anaphylaxis, such as:  ? Swollen mouth, tongue, or throat.  ? Pain or tightness in your chest.  ? Trouble breathing or shortness of breath.  ? Dizziness or fainting.  ? Severe abdominal pain, vomiting, or diarrhea.  This information is not intended to replace advice given to you by your health care provider. Make sure you discuss any questions you have with your health care provider.  Document Revised: 03/13/2019 Document Reviewed: 07/05/2017  Elsevier Patient Education © 2020 [employer]vier Inc.     Airway patent, Nasal mucosa clear. Mouth with normal mucosa. Throat has no vesicles, no oropharyngeal exudates and uvula is midline.

## 2020-12-01 ENCOUNTER — APPOINTMENT (OUTPATIENT)
Dept: WOUND CARE | Facility: CLINIC | Age: 75
End: 2020-12-01
Payer: MEDICARE

## 2020-12-01 VITALS
WEIGHT: 110 LBS | HEART RATE: 88 BPM | DIASTOLIC BLOOD PRESSURE: 78 MMHG | SYSTOLIC BLOOD PRESSURE: 123 MMHG | HEIGHT: 58 IN | BODY MASS INDEX: 23.09 KG/M2 | TEMPERATURE: 96.3 F

## 2020-12-01 PROCEDURE — 99203 OFFICE O/P NEW LOW 30 MIN: CPT

## 2020-12-01 NOTE — HISTORY OF PRESENT ILLNESS
[FreeTextEntry1] : New pt is 75yo who presents w/ right lateral ankle skin changes and darkening of the skin. This began 2 months ago and gradually has progressed. They have been using Vaseline to treat the skin, they have not seen any other doctors at this time. Denies N/V/F/SoB

## 2020-12-01 NOTE — PHYSICAL EXAM
[1+] : left 1+ [Ankle Swelling (On Exam)] : present [Ankle Swelling Bilaterally] : bilaterally  [Varicose Veins Of Lower Extremities] : bilaterally [] : bilaterally [Ankle Swelling On The Left] : moderate [de-identified] : chronic venous stasis dermatitis right>left  [Please See PDF for [employer]] : Please See PDF for [employer].

## 2020-12-01 NOTE — PLAN
[FreeTextEntry1] : Pt is 73 yo who presents w/ bilateral venous insufficiency \par -pt was seen and examined\par -RLE skin changes, hemosiderin depositis consistent w/ chronic venous insufficiency, no wounds, open lesions or local signs of infection\par -Recommend f/u w/ vascular doctor for management, as well as vascular studies prior to vascular appointment \par

## 2020-12-08 ENCOUNTER — NON-APPOINTMENT (OUTPATIENT)
Age: 75
End: 2020-12-08

## 2020-12-09 ENCOUNTER — APPOINTMENT (OUTPATIENT)
Dept: WOUND CARE | Facility: CLINIC | Age: 75
End: 2020-12-09
Payer: MEDICARE

## 2020-12-09 VITALS
HEART RATE: 87 BPM | WEIGHT: 117 LBS | BODY MASS INDEX: 24.56 KG/M2 | DIASTOLIC BLOOD PRESSURE: 73 MMHG | HEIGHT: 58 IN | SYSTOLIC BLOOD PRESSURE: 123 MMHG

## 2020-12-09 DIAGNOSIS — I87.2 VENOUS INSUFFICIENCY (CHRONIC) (PERIPHERAL): ICD-10-CM

## 2020-12-09 PROCEDURE — 99204 OFFICE O/P NEW MOD 45 MIN: CPT

## 2020-12-09 PROCEDURE — 93922 UPR/L XTREMITY ART 2 LEVELS: CPT

## 2020-12-12 NOTE — HISTORY OF PRESENT ILLNESS
[FreeTextEntry1] : \par Ms. SELVIN CLAIRE   presents to the office with a wound for two weeks duration. The wound is located on  the right anterior leg which has now healed. She complains of skin changes with progressive hyperpigmentation to bilateral lower extremities.  s/p vein stripping RLE.   The patient had been dressing the wound with gauze.  The patient denies fevers or chills.. The patient has no other complaints or associated symptoms. HbA1c is 10 as per daughter present during the visit.\par \par

## 2020-12-12 NOTE — REASON FOR VISIT
[Consultation] : a consultation visit [Family Member] : family member [FreeTextEntry1] : bilateral leg assessment

## 2020-12-12 NOTE — PHYSICAL EXAM
[Normal Breath Sounds] : Normal breath sounds [0] : left 0 [JVD] : no jugular venous distention  [Abdomen Tenderness] : ~T ~M No abdominal tenderness [de-identified] : NAD, ambulatory [de-identified] : AT [de-identified] : supple [de-identified] : soft

## 2020-12-12 NOTE — ASSESSMENT
[Arterial/Venous Disease] : arterial/venous disease [FreeTextEntry1] : Patient underwent evaluation for peripheral arterial disease using a [employer] diagnostic machine.  Ankle brachial index was obtained using blood pressure cuffs of the ankle and brachial segments of both legs.  A distal pulse volume recording was noted digitally on the device.  The procedure was supervised and interpreted by the physician.  EMI of the right lower extremity PAD.   EMI of the left lower extremity PAD.  Waveform noted to be biphasic.   Patient tolerated procedure well in the office.  Results discussed with the patient.\par \par Awaiting for vascular testing.\par No clinical sign of infection\par Limbs measured

## 2020-12-23 ENCOUNTER — APPOINTMENT (OUTPATIENT)
Dept: WOUND CARE | Facility: CLINIC | Age: 75
End: 2020-12-23
Payer: MEDICARE

## 2020-12-23 DIAGNOSIS — T14.90XA INJURY, UNSPECIFIED, INITIAL ENCOUNTER: ICD-10-CM

## 2020-12-23 DIAGNOSIS — E11.9 TYPE 2 DIABETES MELLITUS W/OUT COMPLICATIONS: ICD-10-CM

## 2020-12-23 PROCEDURE — 93970 EXTREMITY STUDY: CPT

## 2020-12-23 PROCEDURE — 93923 UPR/LXTR ART STDY 3+ LVLS: CPT

## 2020-12-23 PROCEDURE — 99213 OFFICE O/P EST LOW 20 MIN: CPT

## 2020-12-23 NOTE — PLAN
[FreeTextEntry1] : patient underwent vascular testing today which demonstrates no significant venous insufficiency.  Bilateral vein harvest.  No significant arterial insufficiency. EMI 0.9 bilateral\par wounds closed\par f/u prn\par Moisturizer and encouraged compression stocking use

## 2020-12-23 NOTE — PHYSICAL EXAM
[JVD] : no jugular venous distention  [Normal Breath Sounds] : Normal breath sounds [0] : left 0 [Abdomen Tenderness] : ~T ~M No abdominal tenderness [de-identified] : NAD, ambulatory [de-identified] : AT [de-identified] : supple [de-identified] : soft

## 2020-12-23 NOTE — ASSESSMENT
[FreeTextEntry1] : Patient underwent evaluation for peripheral arterial disease using a [employer] diagnostic machine.  Ankle brachial index was obtained using blood pressure cuffs of the ankle and brachial segments of both legs.  A distal pulse volume recording was noted digitally on the device.  The procedure was supervised and interpreted by the physician.  EMI of the right lower extremity PAD.   EMI of the left lower extremity PAD.  Waveform noted to be biphasic.   Patient tolerated procedure well in the office.  Results discussed with the patient.\par \par Awaiting for vascular testing.\par No clinical sign of infection\par Limbs measured [Arterial/Venous Disease] : arterial/venous disease

## 2021-01-12 ENCOUNTER — INPATIENT (INPATIENT)
Facility: HOSPITAL | Age: 76
LOS: 20 days | Discharge: NURSING, REHAB OR LONG-TERM CARE | DRG: 286 | End: 2021-02-02
Attending: STUDENT IN AN ORGANIZED HEALTH CARE EDUCATION/TRAINING PROGRAM | Admitting: HOSPITALIST
Payer: MEDICARE

## 2021-01-12 VITALS
OXYGEN SATURATION: 95 % | RESPIRATION RATE: 22 BRPM | HEART RATE: 95 BPM | WEIGHT: 110.01 LBS | DIASTOLIC BLOOD PRESSURE: 79 MMHG | SYSTOLIC BLOOD PRESSURE: 121 MMHG | HEIGHT: 59 IN | TEMPERATURE: 99 F

## 2021-01-12 DIAGNOSIS — R06.03 ACUTE RESPIRATORY DISTRESS: ICD-10-CM

## 2021-01-12 DIAGNOSIS — Z95.1 PRESENCE OF AORTOCORONARY BYPASS GRAFT: Chronic | ICD-10-CM

## 2021-01-12 LAB
ALBUMIN SERPL ELPH-MCNC: 4.3 G/DL — SIGNIFICANT CHANGE UP (ref 3.3–5)
ALP SERPL-CCNC: 113 U/L — SIGNIFICANT CHANGE UP (ref 40–120)
ALT FLD-CCNC: 23 U/L — SIGNIFICANT CHANGE UP (ref 10–45)
ANION GAP SERPL CALC-SCNC: 20 MMOL/L — HIGH (ref 5–17)
APPEARANCE UR: CLEAR — SIGNIFICANT CHANGE UP
APTT BLD: 52.2 SEC — HIGH (ref 27.5–35.5)
AST SERPL-CCNC: 32 U/L — SIGNIFICANT CHANGE UP (ref 10–40)
BACTERIA # UR AUTO: NEGATIVE — SIGNIFICANT CHANGE UP
BASOPHILS # BLD AUTO: 0.06 K/UL — SIGNIFICANT CHANGE UP (ref 0–0.2)
BASOPHILS NFR BLD AUTO: 0.5 % — SIGNIFICANT CHANGE UP (ref 0–2)
BILIRUB SERPL-MCNC: 0.5 MG/DL — SIGNIFICANT CHANGE UP (ref 0.2–1.2)
BILIRUB UR-MCNC: NEGATIVE — SIGNIFICANT CHANGE UP
BUN SERPL-MCNC: 60 MG/DL — HIGH (ref 7–23)
CALCIUM SERPL-MCNC: 10.2 MG/DL — SIGNIFICANT CHANGE UP (ref 8.4–10.5)
CHLORIDE SERPL-SCNC: 104 MMOL/L — SIGNIFICANT CHANGE UP (ref 96–108)
CO2 SERPL-SCNC: 19 MMOL/L — LOW (ref 22–31)
COLOR SPEC: SIGNIFICANT CHANGE UP
CREAT SERPL-MCNC: 2.15 MG/DL — HIGH (ref 0.5–1.3)
DIFF PNL FLD: NEGATIVE — SIGNIFICANT CHANGE UP
EOSINOPHIL # BLD AUTO: 0.32 K/UL — SIGNIFICANT CHANGE UP (ref 0–0.5)
EOSINOPHIL NFR BLD AUTO: 2.9 % — SIGNIFICANT CHANGE UP (ref 0–6)
EPI CELLS # UR: 1 /HPF — SIGNIFICANT CHANGE UP
GAS PNL BLDV: SIGNIFICANT CHANGE UP
GLUCOSE SERPL-MCNC: 80 MG/DL — SIGNIFICANT CHANGE UP (ref 70–99)
GLUCOSE UR QL: NEGATIVE — SIGNIFICANT CHANGE UP
HCT VFR BLD CALC: 35.7 % — SIGNIFICANT CHANGE UP (ref 34.5–45)
HGB BLD-MCNC: 11 G/DL — LOW (ref 11.5–15.5)
HYALINE CASTS # UR AUTO: 4 /LPF — HIGH (ref 0–2)
IMM GRANULOCYTES NFR BLD AUTO: 0.5 % — SIGNIFICANT CHANGE UP (ref 0–1.5)
INR BLD: 1.15 RATIO — SIGNIFICANT CHANGE UP (ref 0.88–1.16)
KETONES UR-MCNC: NEGATIVE — SIGNIFICANT CHANGE UP
LEUKOCYTE ESTERASE UR-ACNC: ABNORMAL
LYMPHOCYTES # BLD AUTO: 1.68 K/UL — SIGNIFICANT CHANGE UP (ref 1–3.3)
LYMPHOCYTES # BLD AUTO: 15.3 % — SIGNIFICANT CHANGE UP (ref 13–44)
MCHC RBC-ENTMCNC: 27.4 PG — SIGNIFICANT CHANGE UP (ref 27–34)
MCHC RBC-ENTMCNC: 30.8 GM/DL — LOW (ref 32–36)
MCV RBC AUTO: 88.8 FL — SIGNIFICANT CHANGE UP (ref 80–100)
MONOCYTES # BLD AUTO: 0.75 K/UL — SIGNIFICANT CHANGE UP (ref 0–0.9)
MONOCYTES NFR BLD AUTO: 6.8 % — SIGNIFICANT CHANGE UP (ref 2–14)
NEUTROPHILS # BLD AUTO: 8.11 K/UL — HIGH (ref 1.8–7.4)
NEUTROPHILS NFR BLD AUTO: 74 % — SIGNIFICANT CHANGE UP (ref 43–77)
NITRITE UR-MCNC: NEGATIVE — SIGNIFICANT CHANGE UP
NRBC # BLD: 0 /100 WBCS — SIGNIFICANT CHANGE UP (ref 0–0)
NT-PROBNP SERPL-SCNC: 5178 PG/ML — HIGH (ref 0–300)
PH UR: 6 — SIGNIFICANT CHANGE UP (ref 5–8)
PLATELET # BLD AUTO: 361 K/UL — SIGNIFICANT CHANGE UP (ref 150–400)
POTASSIUM SERPL-MCNC: 3.6 MMOL/L — SIGNIFICANT CHANGE UP (ref 3.5–5.3)
POTASSIUM SERPL-SCNC: 3.6 MMOL/L — SIGNIFICANT CHANGE UP (ref 3.5–5.3)
PROT SERPL-MCNC: 8.5 G/DL — HIGH (ref 6–8.3)
PROT UR-MCNC: ABNORMAL
PROTHROM AB SERPL-ACNC: 13.7 SEC — HIGH (ref 10.6–13.6)
RAPID RVP RESULT: SIGNIFICANT CHANGE UP
RBC # BLD: 4.02 M/UL — SIGNIFICANT CHANGE UP (ref 3.8–5.2)
RBC # FLD: 18.1 % — HIGH (ref 10.3–14.5)
RBC CASTS # UR COMP ASSIST: 1 /HPF — SIGNIFICANT CHANGE UP (ref 0–4)
SARS-COV-2 RNA SPEC QL NAA+PROBE: SIGNIFICANT CHANGE UP
SODIUM SERPL-SCNC: 143 MMOL/L — SIGNIFICANT CHANGE UP (ref 135–145)
SP GR SPEC: 1.01 — SIGNIFICANT CHANGE UP (ref 1.01–1.02)
TROPONIN T, HIGH SENSITIVITY RESULT: 56 NG/L — HIGH (ref 0–51)
UROBILINOGEN FLD QL: NEGATIVE — SIGNIFICANT CHANGE UP
WBC # BLD: 10.97 K/UL — HIGH (ref 3.8–10.5)
WBC # FLD AUTO: 10.97 K/UL — HIGH (ref 3.8–10.5)
WBC UR QL: 5 /HPF — SIGNIFICANT CHANGE UP (ref 0–5)

## 2021-01-12 PROCEDURE — 99291 CRITICAL CARE FIRST HOUR: CPT | Mod: GC

## 2021-01-12 PROCEDURE — 71045 X-RAY EXAM CHEST 1 VIEW: CPT | Mod: 26

## 2021-01-12 PROCEDURE — 93010 ELECTROCARDIOGRAM REPORT: CPT | Mod: GC

## 2021-01-12 PROCEDURE — 99223 1ST HOSP IP/OBS HIGH 75: CPT | Mod: GC

## 2021-01-12 RX ORDER — PIPERACILLIN AND TAZOBACTAM 4; .5 G/20ML; G/20ML
3.38 INJECTION, POWDER, LYOPHILIZED, FOR SOLUTION INTRAVENOUS ONCE
Refills: 0 | Status: COMPLETED | OUTPATIENT
Start: 2021-01-12 | End: 2021-01-12

## 2021-01-12 RX ORDER — VANCOMYCIN HCL 1 G
1000 VIAL (EA) INTRAVENOUS ONCE
Refills: 0 | Status: COMPLETED | OUTPATIENT
Start: 2021-01-12 | End: 2021-01-12

## 2021-01-12 RX ORDER — SODIUM CHLORIDE 9 MG/ML
1000 INJECTION INTRAMUSCULAR; INTRAVENOUS; SUBCUTANEOUS ONCE
Refills: 0 | Status: COMPLETED | OUTPATIENT
Start: 2021-01-12 | End: 2021-01-12

## 2021-01-12 RX ADMIN — SODIUM CHLORIDE 1000 MILLILITER(S): 9 INJECTION INTRAMUSCULAR; INTRAVENOUS; SUBCUTANEOUS at 18:48

## 2021-01-12 RX ADMIN — Medication 250 MILLIGRAM(S): at 21:22

## 2021-01-12 RX ADMIN — PIPERACILLIN AND TAZOBACTAM 3.38 GRAM(S): 4; .5 INJECTION, POWDER, LYOPHILIZED, FOR SOLUTION INTRAVENOUS at 22:16

## 2021-01-12 RX ADMIN — PIPERACILLIN AND TAZOBACTAM 200 GRAM(S): 4; .5 INJECTION, POWDER, LYOPHILIZED, FOR SOLUTION INTRAVENOUS at 18:48

## 2021-01-12 NOTE — ED PROVIDER NOTE - PHYSICAL EXAMINATION
Gen: AAOx3, tired appearing  Head: NCAT  HEENT: EOMI, oral mucosa moist, normal conjunctiva  Lung: CTAB, increase work of breathing, no wheezes/rhonchi/rales B/L, speaking in full sentences  CV: RRR, no murmurs, rubs or gallops  Abd: soft, NTND, no guarding, no CVA tenderness  MSK: no visible deformities  Neuro: No focal sensory or motor deficits  Skin: Warm, well perfused, diffuse skin discoloration.   Psych: normal affect.   ~Clay Lassiter M.D. Resident

## 2021-01-12 NOTE — ED PROVIDER NOTE - CLINICAL SUMMARY MEDICAL DECISION MAKING FREE TEXT BOX
75yF Faroese speaking h/o HTN, HLD, DM, CAD s/p CABG, severe mitral regurgitation (s/p mitral clip), severe pulmonary HTN, CKD IV, hypothyroidism BIBEMS 2/2 worsening sob. Patient sating 97 RA but with some increase work of breathing. Concern for sepsis vs covid vs URI vs PNA vs PE. Will get labs, coags, blood gas, cultures, UA, U culture, dimer, sup 02, Covid, CXR and plan to admit

## 2021-01-12 NOTE — H&P ADULT - PROBLEM SELECTOR PLAN 6
On Levothyroxine 50 mcg daily    Plan:   - Order TSH  - continue levothyroxine 50 mcg On Levothyroxine 50 mcg daily    Plan:   - Order TSH  - continue levothyroxine 25 mcg IV daily (since patient NPO)

## 2021-01-12 NOTE — H&P ADULT - PROBLEM SELECTOR PLAN 1
Patient describes increased work of breathing and tachypnea, does not appear to be hypercarbic or hypoxic  CXR: Bilateral predominantly lower lobe hazy opacities. Pneumonia cannot be ruled out.  sx Could be 2/2 pneumonia and/or worsening heart function  Leukocytosis 10.97, elevated CRP 0.63, lactate on VBG 2.7  Elevated pro-BNP 5178  COVID RVP negative   meets sepsis criteria  s/p 1 L NS, 1 dose Vanco, and 1 dose Zosyn    Plan:   - broad spectrum abx: Vanco , Cefepime?  - follow blood cx  - follow urine cx  - sputum cx?   - urine legionella  - TTE  - diuresis?   - cards consult Patient describes increased work of breathing and tachypnea, does not appear to be hypercarbic or hypoxic  CXR: Bilateral predominantly lower lobe hazy opacities. Pneumonia cannot be ruled out.  sx Could be 2/2 pneumonia and/or worsening heart function  Leukocytosis 10.97, elevated CRP 0.63, lactate on VBG 2.7  Elevated pro-BNP 5178  COVID RVP negative   meets sepsis criteria  no sx of infection at this time  s/p 1 L NS, 1 dose Vanco, and 1 dose Zosyn    Plan:   - broad spectrum abx: Vanco , Cefepime?  - follow blood cx  - follow urine cx  - sputum cx?   - urine legionella  - TTE  - diuresis?   - cards consult  - trend leukocytosis, lactate Patient describes increased work of breathing and tachypnea, does not appear to be hypercarbic or hypoxic  CXR: Bilateral predominantly lower lobe hazy opacities. Pneumonia cannot be ruled out.  sx Could be 2/2 pneumonia and/or worsening heart function  Leukocytosis 10.97, elevated CRP 0.63, lactate on VBG 2.7, COVID RVP negative  Elevated pro-BNP 5178  meets sepsis criteria  no sx of infection at this time  s/p 1 L NS, 1 dose Vanco, and 1 dose Zosyn    Plan:   - broad spectrum abx: Vanco , Cefepime?  - follow blood cx  - follow urine cx  - sputum cx?   - urine legionella  - TTE  - diuresis?   - cards consult  - trend leukocytosis, lactate  - currently on BiPAP, will downgrade appropriate given only sx tachypnea and increased WOB Patient describes increased work of breathing and tachypnea, does not appear to be hypercarbic or hypoxic  CXR: Bilateral predominantly lower lobe hazy opacities. Pneumonia cannot be ruled out.  sx Could be 2/2 pneumonia and/or worsening heart function  Leukocytosis 10.97, elevated CRP 0.63, lactate on VBG 2.7, COVID neg  Elevated pro-BNP 5178  s/p 1 L NS, 1 dose Vanco, and 1 dose Zosyn  Plan:   - broad spectrum abx: Vanco , Cefepime renally dosed   - follow blood cx, urine cx, sputum cx if patient produces sputum   - urine legionella  - TTE  - cards consult  - trend leukocytosis, lactate  - currently on BiPAP, will downgrade appropriate, currently gets tachypneic off BiPap  - low suspicion to PE, no tachycardia or hypoxia, hold off on CTA w/ CT given CKD  - CT chest no contrast

## 2021-01-12 NOTE — ED PROVIDER NOTE - ATTENDING CONTRIBUTION TO CARE
Pt with respiratory distress over last day, worse with time and at rest, exam: no JVD, diminished bs at base, tachypneic to 35 with mild retractions, appears in respiratory discomfort, dry mm, no peripheral edema, rectal temp 99.6F by me at 18:45pm.  Suspect pneumonia, will start antibiotics, test for covid, fluids, oxygen therapy and consider bipap.

## 2021-01-12 NOTE — ED PROVIDER NOTE - OBJECTIVE STATEMENT
ID: [id number]  75yF Czech speaking h/o HTN, HLD, DM, CAD s/p CABG, severe mitral regurgitation (s/p mitral clip), severe pulmonary HTN, CKD IV, hypothyroidism BIBEMS 2/2 worsening sob. Per EMS: Patient found to have increase work of breathing at home sating 96 RA given 3L NC en route. Reports worsening symptoms for the past 2 weeks. Endorse diffuse intermittent itching and rash of 2 months duration. No fever, chest pain, abd pain, n/v, urinary or bowel irregularities.  ID: [id number]  75yF Czech speaking h/o HTN, HLD, DM, CAD s/p CABG, severe mitral regurgitation (s/p mitral clip), severe pulmonary HTN, CKD IV, hypothyroidism BIBEMS 2/2 worsening sob. Per EMS: Patient found to have increase work of breathing at home sating 96 RA given 3L NC en route. Reports worsening symptoms for the past 2 weeks. Endorse diffuse intermittent itching and rash of 2 months duration. No fever, chest pain, abd pain, n/v, urinary or bowel irregularities.    Collateral Information from  [phone]): Patient has been having worsening shortness of breath for days but progressively gotten worse since yesterday night. No fevers recorded at home.  ID: [id number]  75yF Irish speaking h/o HTN, HLD, DM, CAD s/p CABG, severe mitral regurgitation (s/p mitral clip), severe pulmonary HTN, CKD IV, hypothyroidism BIBEMS 2/2 worsening sob. Per EMS: Patient found to have increase work of breathing at home sating 96 RA given 3L NC en route. Reports worsening symptoms for the past 2 weeks. Endorse diffuse intermittent itching and rash of 2 months duration. No fever, chest pain, abd pain, n/v, urinary or bowel irregularities.    Collateral Information from  ([phone]): Patient has been having worsening shortness of breath for days but progressively gotten worse since yesterday night. No fevers recorded at home.    PCP: Neelam Calderon

## 2021-01-12 NOTE — H&P ADULT - PROBLEM SELECTOR PLAN 5
On home insulin: humalog 18 pre-meals and Lantus 60 at bedtime (daughter says also takes lantus 20 in the AM)    Plan:   - ISS, adjust as needed  - follow HgbA1c On home insulin: humalog 18 pre-meals and Lantus 60 at bedtime (daughter says also takes lantus 20 in the AM)    Plan:   - ISS moderate, adjust as needed  - follow HgbA1c  - 20 lantus qhs On home insulin: humalog 18 pre-meals and Lantus 60 at bedtime (daughter says also takes lantus 20 in the AM)    Plan:   - ISS moderate, adjust as needed  - follow HgbA1c

## 2021-01-12 NOTE — H&P ADULT - NSICDXPASTMEDICALHX_GEN_ALL_CORE_FT
PAST MEDICAL HISTORY:  CAD (coronary artery disease) s/p CABG and PCI    Diabetes mellitus, type 2     H/O pulmonary hypertension     Heart failure     Hyperlipidemia     Hypothyroidism

## 2021-01-12 NOTE — H&P ADULT - ATTENDING COMMENTS
Pt seen and examined. History obtained in pts native language - provider speaks [facility].   Pt with extensive cardiac hx with HFrEF, p/w worsening SOB, noted with increased WOB and tachypnea in ED - placed on BiPAP with some improvement. CXR showing b/l lower lobe opacities, ?PNA. pt afebrile, no significant leukocytosis, COVID (-) x 2.   - will c/w vanco and cefepime for empiric PNA treatment   - check CT chest non con to eval further   - c/w BiPAP for now and wean as tolerated   - euvolemic on exam, lower suspicion for HF exacerbation, but noted elevated proBNP - will check TTE, c/w home dose metoprolol and bumex converted to IV; consider cardiology consult in AM, 24hrs tele - can d/c if no events   - hypoglycemic as had minimal PO intake today, s/p D50 - hold home lantus, monitor FS closely    plan d/w patient in [facility]; d/w Dr Puente; RN at bedside

## 2021-01-12 NOTE — H&P ADULT - NSHPSOCIALHISTORY_GEN_ALL_CORE
Lives with daughter and son-in law. Denies smoking, drinking, or recreational drug use. Patient able to eat and ambulate around house on her own. However is constantly watched by her daughter and is assisted with bathing.

## 2021-01-12 NOTE — ED ADULT NURSE REASSESSMENT NOTE - NS ED NURSE REASSESS COMMENT FT1
Pt introduced to oncoming RN and updated on plan of care. A&Ox4, breathing labored and tachypneic, belly breathing using accessory muscles. Repositioned sitting upright in bed, NRB mask on.  Pt reports she is comfortable in this position. Assisted to use bedpan. clean urine sample obtained and sent to lab. Call bell in reach pt educated on use, bed in lowest position.

## 2021-01-12 NOTE — ED ADULT NURSE NOTE - OBJECTIVE STATEMENT
74 y/o female hx HTN, HLD, DM, CAD s/p CABG, severe mitral regurgitation, severe pulmonary HTN, CKD IV, hypothyroidism presents to the ED via EMS from home c/o SOB x 2 weeks. Pt states SOB has been worsening, EMS arrival pt was severely SOB with 96% saturation on RA, tachypneic, placed on 3L O2 NC. Denies fever, chills, n/v, weakness, abd pain, diarrhea/constipation, numbness/tingling, urinary s/s. Pt A&Ox3, increased WOB, accessory muscle use, pt placed on NRB mask, continuous pulse ox monitoring saturation 100% on NRB, resting comfortably, no CP, PT safety maintained, call bell within reach and bed in the lowest position.

## 2021-01-12 NOTE — H&P ADULT - ASSESSMENT
75y.o F Tajik speaking h/o HTN, HLD, DM, CAD s/p CABG 2014 and prior PCI, severe mitral regurgitation (s/p mitral clip 2019), pulmonary HTN (TTE 2019), HF (EF 21 % June 2019), CKD IV not on dialysis (b/l SCr 2-2.2), hypothyroidism BIBEMS 2/2 worsening work of breathing likely secondary to PNA and/or worsening heart function.  75y.o F Persian speaking h/o HTN, HLD, DM, CAD s/p CABG 2014 and prior PCI, severe mitral regurgitation (s/p mitral clip 2019), pulmonary HTN (TTE 2019), HF (EF 21 % June 2019), CKD IV not on dialysis (b/l SCr 2-2.2), hypothyroidism BIBEMS 2/2 worsening work of breathing likely secondary to PNA and/or heart failure/cardiac origin.

## 2021-01-12 NOTE — H&P ADULT - PROBLEM SELECTOR PLAN 2
TTE from June 2019 shows EF 21 %, global LV dysfunction  Presenting sx may be 2/2 to ADHF  pro-BNP 5178, troponin 56 with no chest pain  No LE edema, but decreased breath sounds at bases  Home meds: metoprolol tartrate 12.5 mg bid, midodrine 10 mg TID, atorvastatin 80 mg daily, ASA 81 mg daily, plavix 75 mg daily, bumex 2 mg bid    Plan:   - continue ASA  - continue plavix  - continue metoprolol 12.5 mg bid   - Bumex?  - TTE  - Cards consult TTE from June 2019 shows EF 21 %, global LV dysfunction  Presenting sx may be 2/2 to ADHF  pro-BNP 5178, troponin 56 with no chest pain  No LE edema, but decreased breath sounds at bases, appears euvolemic on exam  Home meds: metoprolol tartrate 12.5 mg bid, midodrine 10 mg TID, atorvastatin 80 mg daily, ASA 81 mg daily, plavix 75 mg daily, bumex 2 mg bid    Plan:   - continue ASA when off BiPAP  - continue plavix when off BiPAP  - continue metoprolol 12.5 mg bid (converted to 2.5 q6h IV push)  - continue Bumex 2mg IV bid   - TTE  - Cards consult in AM   - will give midodrine as needed (10 mg q8h)   - Strict I/O and daily weights   - admitted to tele floor   - repeat trops with AM labs TTE from June 2019 shows EF 21 %, global LV dysfunction  Presenting sx may be 2/2 to ADHF   pro-BNP 5178, troponin 56 with no chest pain  No LE edema, but decreased breath sounds at bases, appears euvolemic on exam  Home meds: metoprolol tartrate 12.5 mg bid, midodrine 10 mg TID, atorvastatin 80 mg daily, ASA 81 mg daily, plavix 75 mg daily, bumex 2 mg bid    Plan:   - continue ASA when off BiPAP  - continue plavix when off BiPAP  - continue metoprolol 12.5 mg bid (converted to 2.5 q6h IV push)  - continue Bumex 2mg IV bid   - TTE  - Cards consult in AM   - will give midodrine as needed (10 mg q8h)   - Strict I/O and daily weights   - admitted to tele floor   - repeat trops with AM labs

## 2021-01-12 NOTE — H&P ADULT - PROBLEM SELECTOR PLAN 3
Appears to be 2/2 venous stasis on PE  However increased with walking and is calf pain    Plan:   - DVT studies  - EMI/PVR to rule out PAD Appears to be 2/2 venous stasis on PE  However pain is increased with walking with +calf pain    Plan:   - DVT studies  - EMI/PVR to rule out PAD

## 2021-01-12 NOTE — H&P ADULT - NSICDXPASTSURGICALHX_GEN_ALL_CORE_FT
PAST SURGICAL HISTORY:  S/P CABG (coronary artery bypass graft)     S/P mitral valve clip implantation

## 2021-01-12 NOTE — ED PROVIDER NOTE - NS ED ROS FT
GENERAL: No fever or chills, EYES: no change in vision, HEENT: no trouble swallowing or speaking, CARDIAC: no chest pain, PULMONARY: no cough,  +SOB, GI: no abdominal pain, no nausea, no vomiting, no diarrhea or constipation, : No changes in urination, SKIN: no rashes, NEURO: no headache,  MSK: No joint pain ~Clay Lassiter M.D. Resident

## 2021-01-12 NOTE — H&P ADULT - NSHPLABSRESULTS_GEN_ALL_CORE
11.0   10.97 )-----------( 361      ( 2021 17:42 )             35.7     Auto Eosinophil # 0.32  / Auto Eosinophil % 2.9   / Auto Neutrophil # 8.11  / Auto Neutrophil % 74.0  / BANDS % x        12    143  |  104  |  60<H>  ----------------------------<  80  3.6   |  19<L>  |  2.15<H>    Ca    10.2      2021 17:42  TPro  8.5<H>  /  Alb  4.3  /  TBili  0.5  /  DBili  x   /  AST  32  /  ALT  23  /  AlkPhos  113      PT/INR - ( 2021 17:42 )   PT: 13.7 sec;   INR: 1.15 ratio         PTT - ( 2021 17:42 )  PTT:52.2 sec      Urinalysis Basic - ( 2021 19:28 )    Color: Light Yellow / Appearance: Clear / S.011 / pH: x  Gluc: x / Ketone: Negative  / Bili: Negative / Urobili: Negative   Blood: x / Protein: 30 mg/dL / Nitrite: Negative   Leuk Esterase: Moderate / RBC: 1 /hpf / WBC 5 /HPF   Sq Epi: x / Non Sq Epi: 1 /hpf / Bacteria: Negative    COVID RVP negative    CXR: Bilateral predominantly lower lobe hazy opacities. Pneumonia cannot be ruled out. Labs, imaging and EKG personally reviewed and interpreted by me.                11.0   10.97 )-----------( 361      ( 2021 17:42 )             35.7     Auto Eosinophil # 0.32  / Auto Eosinophil % 2.9   / Auto Neutrophil # 8.11  / Auto Neutrophil % 74.0  / BANDS % x        12    143  |  104  |  60<H>  ----------------------------<  80  3.6   |  19<L>  |  2.15<H>    Ca    10.2      2021 17:42  TPro  8.5<H>  /  Alb  4.3  /  TBili  0.5  /  DBili  x   /  AST  32  /  ALT  23  /  AlkPhos  113      PT/INR - ( 2021 17:42 )   PT: 13.7 sec;   INR: 1.15 ratio         PTT - ( 2021 17:42 )  PTT:52.2 sec      Urinalysis Basic - ( 2021 19:28 )    Color: Light Yellow / Appearance: Clear / S.011 / pH: x  Gluc: x / Ketone: Negative  / Bili: Negative / Urobili: Negative   Blood: x / Protein: 30 mg/dL / Nitrite: Negative   Leuk Esterase: Moderate / RBC: 1 /hpf / WBC 5 /HPF   Sq Epi: x / Non Sq Epi: 1 /hpf / Bacteria: Negative    COVID RVP negative    CXR: Bilateral predominantly lower lobe hazy opacities. Pneumonia cannot be ruled out.    EKG ST 100s, no acute ischemic changes

## 2021-01-12 NOTE — H&P ADULT - HISTORY OF PRESENT ILLNESS
75yF Divehi speaking h/o HTN, HLD, DM, CAD s/p CABG 2014 and prior PCI, severe mitral regurgitation (s/p mitral clip 2019), pulmonary HTN, HF (EF 21 % June 2019), CKD IV not on dialysis (b/l SCr 2-2.2), hypothyroidism BIBEMS 2/2 worsening sob.   Per patient and daughter, patient has been experiencing increased work of breathing over the past few weeks, which has been getting progressively worse, being the worst yesteday causing family to call EMS. Patient states she "feels tired from breathing" and small activities such as eating or walking around her house make her very weak.   Patient denies feeling SOB,  or having chest pain, nausea, vomiting, abdominal pain, diarrhea, constipation. Patient denies fevers, rhinorrhea, throat pain, sore throat, cough, sick contacts, COVID contacts, patient has been staying home for most of the time. Patient does endorse full body itching chronically and chronic RLE pain exacerbated with walking.   Patient's daughter and caretaker states that patient has been progressively deteriating. Cognitively she is all there however she gets weak with small activities such as walking or eating. Daughter states that patient appears to be weaker than during her last hospital admission in 2019, where her EF was estimated to be 21 % on TTE. Daughter's concern is that more than infection, this may be worsening of her mother's heart condition.                                                                                                     Per EMS: Patient found to have increase work of breathing at home sating 96 RA and given 3L NC en route.     Of note patient was admitted to CCU for acute decompensated HF in Oct 2019 with elevated trops to 1800, patient was diuresed, became hypotensive, eventually weaned off pressors and discharged on midodrine. TTE during that time (June 2019) showed showed EF 21%, Severe global left ventricular systolic dysfunction. Mild-moderate tricuspid regurgitation, and severe pulm hypertension.   ED course:   Vitals: afebrile, HR ~90s, -140/70-80s, RR up to 30-40s, SatO2 100 % on 15 L non-rebreather.   Labs: CBC significant for leukocytosis of 10.97, 74 % neutrophils, Hgb 11 (seems to be patient's baseline), plt 361. Coags showed PT of 13.7 and PTT 52, INR 1.15, D-dimer was 433 (elevated)   CMP wnl except HCO3 19, anion gap 20, BUN 60 with SCr 2.15 (seems to be patient's b/l)  Troponin was 56, pro-BNP 5178, it was 26,780 during patient's last hospitalization for ADHF   Procalcitonin 0.14  CRP 0.63  Lactate on VBG was 2.7. VBG showed pH 7.37 with pCO2 40.   U/A showed 30 proteinuria, and mod leuk esterase  Rapid COVID test negative x2   COVID PCR, blood cx, and urine cx were sent   CXR: Bilateral predominantly lower lobe hazy opacities. Pneumonia cannot be ruled out.  EKG: sinus tachy at , with non-specific ST and T wave changes   Patient given 1 dose Zosyn, 1g Vanco, and 1 L NS. Patient was placed on BiPAP for increased work of breathing and tachypnea.  	75yF Albanian speaking h/o HTN, HLD, DM, CAD s/p CABG 2014 and prior PCI, severe mitral regurgitation (s/p mitral clip 2019), pulmonary HTN, HF (EF 21 % June 2019), CKD IV not on dialysis (b/l SCr 2-2.2), hypothyroidism BIBEMS 2/2 worsening work of breathing.   Per patient and daughter, patient has been experiencing increased work of breathing over the past few weeks, which has been getting progressively worse, being the worst yesteday causing family to call EMS. Patient states she "feels tired from breathing" and small activities such as eating or walking around her house make her very weak.   Patient denies feeling SOB,  or having chest pain, nausea, vomiting, abdominal pain, diarrhea, constipation. Patient denies fevers, rhinorrhea, throat pain, sore throat, cough, sick contacts, COVID contacts, patient has been staying home for most of the time. Patient does endorse full body itching chronically and chronic RLE pain exacerbated with walking.   Patient's daughter and caretaker states that patient has been progressively deteriating. Cognitively she is all there however she gets weak with small activities such as walking or eating. Daughter states that patient appears to be weaker than during her last hospital admission in 2019, where her EF was estimated to be 21 % on TTE. Daughter's concern is that more than infection, this may be worsening of her mother's heart condition.                                                                                                     Per EMS: Patient found to have increase work of breathing at home sating 96 RA and given 3L NC en route.     Of note patient was admitted to CCU for acute decompensated HF in Oct 2019 with elevated trops to 1800, patient was diuresed, became hypotensive, eventually weaned off pressors and discharged on midodrine. TTE during that time (June 2019) showed showed EF 21%, Severe global left ventricular systolic dysfunction. Mild-moderate tricuspid regurgitation, and severe pulm hypertension.   ED course:   Vitals: afebrile, HR ~90s, -140/70-80s, RR up to 30-40s, SatO2 100 % on 15 L non-rebreather.   Labs: CBC significant for leukocytosis of 10.97, 74 % neutrophils, Hgb 11 (seems to be patient's baseline), plt 361. Coags showed PT of 13.7 and PTT 52, INR 1.15, D-dimer was 433 (elevated)   CMP wnl except HCO3 19, anion gap 20, BUN 60 with SCr 2.15 (seems to be patient's b/l)  Troponin was 56, pro-BNP 5178, it was 26,780 during patient's last hospitalization for ADHF   Procalcitonin 0.14  CRP 0.63  Lactate on VBG was 2.7. VBG showed pH 7.37 with pCO2 40.   U/A showed 30 proteinuria, and mod leuk esterase  Rapid COVID test negative x2   COVID PCR, blood cx, and urine cx were sent   CXR: Bilateral predominantly lower lobe hazy opacities. Pneumonia cannot be ruled out.  EKG: sinus tachy at , with non-specific ST and T wave changes   Patient given 1 dose Zosyn, 1g Vanco, and 1 L NS. Patient was placed on BiPAP for increased work of breathing and tachypnea.  75yF Turkmen speaking h/o HTN, HLD, DM, CAD s/p CABG 2014 and prior PCI, severe mitral regurgitation (s/p mitral clip 2019), pulmonary HTN, HF (EF 21 % June 2019), CKD IV not on dialysis (b/l SCr 2-2.2), hypothyroidism BIBEMS 2/2 worsening work of breathing.   Per patient and daughter, patient has been experiencing increased work of breathing over the past few weeks, which has been getting progressively worse, being the worst yesterday causing family to call EMS. Patient states she "feels tired from breathing" and small activities such as eating or walking around her house make her very weak.   Patient denies any associated chest pain, nausea, vomiting, abdominal pain, diarrhea, constipation. Patient denies fevers, rhinorrhea, throat pain, sore throat, cough, sick contacts, COVID contacts, patient has been staying home for most of the time. Patient does endorse full body itching chronically and chronic RLE pain exacerbated with walking.   Patient's daughter and caretaker states that patient has been progressively deteriating. Cognitively she is all there however she gets weak with small activities such as walking or eating. Daughter states that patient appears to be weaker than during her last hospital admission in 2019, where her EF was estimated to be 21 % on TTE. Daughter's concern is that more than infection, this may be worsening of her mother's heart condition.                                                                                                   Per EMS: Patient found to have increase work of breathing at home sating 96 RA and given 3L NC en route.   Of note patient was admitted to CCU for acute decompensated HF in Oct 2019 with elevated trops to 1800, patient was diuresed, became hypotensive, eventually weaned off pressors and discharged on midodrine. TTE during that time (June 2019) showed showed EF 21%, Severe global left ventricular systolic dysfunction. Mild-moderate tricuspid regurgitation, and severe pulm hypertension.   ED course:   Vitals: afebrile, HR ~90s, -140/70-80s, RR up to 30-40s, SatO2 100 % on 15 L non-rebreather.   Labs: CBC significant for leukocytosis of 10.97, 74 % neutrophils, Hgb 11 (seems to be patient's baseline), plt 361. Coags showed PT of 13.7 and PTT 52, INR 1.15, D-dimer was 433 (elevated)   CMP wnl except HCO3 19, anion gap 20, BUN 60 with SCr 2.15 (seems to be patient's b/l)  Troponin was 56, pro-BNP 5178, it was 26,780 during patient's last hospitalization for ADHF   Procalcitonin 0.14  CRP 0.63  Lactate on VBG was 2.7. VBG showed pH 7.37 with pCO2 40.   U/A showed 30 proteinuria, and mod leuk esterase  Rapid COVID test negative x2   COVID PCR, blood cx, and urine cx were sent   CXR: Bilateral predominantly lower lobe hazy opacities. Pneumonia cannot be ruled out.  EKG: sinus tachy at , with non-specific ST and T wave changes   Patient given 1 dose Zosyn, 1g Vanco, and 1 L NS. Patient was placed on BiPAP for increased work of breathing and tachypnea.

## 2021-01-12 NOTE — H&P ADULT - PROBLEM SELECTOR PROBLEM 2
Heart failure, unspecified HF chronicity, unspecified heart failure type Chronic systolic heart failure

## 2021-01-12 NOTE — H&P ADULT - PROBLEM SELECTOR PLAN 7
On Atorvastatin 80mg     Plan:  - continue Atorvastatin 80 mg On Atorvastatin 80mg     Plan:  - continue Atorvastatin 80 mg when patient no longer NPO

## 2021-01-13 DIAGNOSIS — M79.604 PAIN IN RIGHT LEG: ICD-10-CM

## 2021-01-13 DIAGNOSIS — I50.22 CHRONIC SYSTOLIC (CONGESTIVE) HEART FAILURE: ICD-10-CM

## 2021-01-13 DIAGNOSIS — R06.03 ACUTE RESPIRATORY DISTRESS: ICD-10-CM

## 2021-01-13 DIAGNOSIS — I50.9 HEART FAILURE, UNSPECIFIED: ICD-10-CM

## 2021-01-13 DIAGNOSIS — E03.9 HYPOTHYROIDISM, UNSPECIFIED: ICD-10-CM

## 2021-01-13 DIAGNOSIS — E11.21 TYPE 2 DIABETES MELLITUS WITH DIABETIC NEPHROPATHY: ICD-10-CM

## 2021-01-13 DIAGNOSIS — L50.9 URTICARIA, UNSPECIFIED: ICD-10-CM

## 2021-01-13 DIAGNOSIS — Z86.79 PERSONAL HISTORY OF OTHER DISEASES OF THE CIRCULATORY SYSTEM: ICD-10-CM

## 2021-01-13 DIAGNOSIS — Z29.9 ENCOUNTER FOR PROPHYLACTIC MEASURES, UNSPECIFIED: ICD-10-CM

## 2021-01-13 DIAGNOSIS — Z98.890 OTHER SPECIFIED POSTPROCEDURAL STATES: Chronic | ICD-10-CM

## 2021-01-13 DIAGNOSIS — E78.5 HYPERLIPIDEMIA, UNSPECIFIED: ICD-10-CM

## 2021-01-13 LAB
A1C WITH ESTIMATED AVERAGE GLUCOSE RESULT: 7.1 % — HIGH (ref 4–5.6)
ALBUMIN SERPL ELPH-MCNC: 4 G/DL — SIGNIFICANT CHANGE UP (ref 3.3–5)
ALP SERPL-CCNC: 103 U/L — SIGNIFICANT CHANGE UP (ref 40–120)
ALT FLD-CCNC: 21 U/L — SIGNIFICANT CHANGE UP (ref 10–45)
ANION GAP SERPL CALC-SCNC: 14 MMOL/L — SIGNIFICANT CHANGE UP (ref 5–17)
AST SERPL-CCNC: 31 U/L — SIGNIFICANT CHANGE UP (ref 10–40)
BASE EXCESS BLDV CALC-SCNC: -3.9 MMOL/L — LOW (ref -2–2)
BILIRUB SERPL-MCNC: 0.5 MG/DL — SIGNIFICANT CHANGE UP (ref 0.2–1.2)
BUN SERPL-MCNC: 55 MG/DL — HIGH (ref 7–23)
CALCIUM SERPL-MCNC: 9 MG/DL — SIGNIFICANT CHANGE UP (ref 8.4–10.5)
CHLORIDE SERPL-SCNC: 109 MMOL/L — HIGH (ref 96–108)
CO2 BLDV-SCNC: 22 MMOL/L — SIGNIFICANT CHANGE UP (ref 22–30)
CO2 SERPL-SCNC: 20 MMOL/L — LOW (ref 22–31)
CREAT SERPL-MCNC: 2.07 MG/DL — HIGH (ref 0.5–1.3)
CULTURE RESULTS: SIGNIFICANT CHANGE UP
ESTIMATED AVERAGE GLUCOSE: 157 MG/DL — HIGH (ref 68–114)
FERRITIN SERPL-MCNC: 111 NG/ML — SIGNIFICANT CHANGE UP (ref 15–150)
GAS PNL BLDV: SIGNIFICANT CHANGE UP
GLUCOSE BLDC GLUCOMTR-MCNC: 104 MG/DL — HIGH (ref 70–99)
GLUCOSE BLDC GLUCOMTR-MCNC: 107 MG/DL — HIGH (ref 70–99)
GLUCOSE BLDC GLUCOMTR-MCNC: 134 MG/DL — HIGH (ref 70–99)
GLUCOSE BLDC GLUCOMTR-MCNC: 52 MG/DL — CRITICAL LOW (ref 70–99)
GLUCOSE BLDC GLUCOMTR-MCNC: 53 MG/DL — CRITICAL LOW (ref 70–99)
GLUCOSE BLDC GLUCOMTR-MCNC: 92 MG/DL — SIGNIFICANT CHANGE UP (ref 70–99)
GLUCOSE BLDC GLUCOMTR-MCNC: 93 MG/DL — SIGNIFICANT CHANGE UP (ref 70–99)
GLUCOSE BLDC GLUCOMTR-MCNC: 95 MG/DL — SIGNIFICANT CHANGE UP (ref 70–99)
GLUCOSE SERPL-MCNC: 83 MG/DL — SIGNIFICANT CHANGE UP (ref 70–99)
HCO3 BLDV-SCNC: 21 MMOL/L — SIGNIFICANT CHANGE UP (ref 21–29)
HCT VFR BLD CALC: 34.3 % — LOW (ref 34.5–45)
HGB BLD-MCNC: 10.3 G/DL — LOW (ref 11.5–15.5)
HOROWITZ INDEX BLDV+IHG-RTO: SIGNIFICANT CHANGE UP
LACTATE BLDV-MCNC: 0.9 MMOL/L — SIGNIFICANT CHANGE UP (ref 0.7–2)
MAGNESIUM SERPL-MCNC: 2.5 MG/DL — SIGNIFICANT CHANGE UP (ref 1.6–2.6)
MAGNESIUM SERPL-MCNC: 2.6 MG/DL — SIGNIFICANT CHANGE UP (ref 1.6–2.6)
MCHC RBC-ENTMCNC: 27 PG — SIGNIFICANT CHANGE UP (ref 27–34)
MCHC RBC-ENTMCNC: 30 GM/DL — LOW (ref 32–36)
MCV RBC AUTO: 90 FL — SIGNIFICANT CHANGE UP (ref 80–100)
MRSA PCR RESULT.: SIGNIFICANT CHANGE UP
NRBC # BLD: 0 /100 WBCS — SIGNIFICANT CHANGE UP (ref 0–0)
PCO2 BLDV: 41 MMHG — SIGNIFICANT CHANGE UP (ref 35–50)
PH BLDV: 7.34 — LOW (ref 7.35–7.45)
PHOSPHATE SERPL-MCNC: 4.8 MG/DL — HIGH (ref 2.5–4.5)
PHOSPHATE SERPL-MCNC: 5.6 MG/DL — HIGH (ref 2.5–4.5)
PLATELET # BLD AUTO: 330 K/UL — SIGNIFICANT CHANGE UP (ref 150–400)
PO2 BLDV: 24 MMHG — LOW (ref 25–45)
POTASSIUM SERPL-MCNC: 3.6 MMOL/L — SIGNIFICANT CHANGE UP (ref 3.5–5.3)
POTASSIUM SERPL-SCNC: 3.6 MMOL/L — SIGNIFICANT CHANGE UP (ref 3.5–5.3)
PROT SERPL-MCNC: 8 G/DL — SIGNIFICANT CHANGE UP (ref 6–8.3)
RBC # BLD: 3.81 M/UL — SIGNIFICANT CHANGE UP (ref 3.8–5.2)
RBC # FLD: 17.8 % — HIGH (ref 10.3–14.5)
S AUREUS DNA NOSE QL NAA+PROBE: DETECTED
SAO2 % BLDV: 27 % — LOW (ref 67–88)
SARS-COV-2 IGG SERPL QL IA: NEGATIVE — SIGNIFICANT CHANGE UP
SARS-COV-2 IGM SERPL IA-ACNC: <0.2 RATIO — SIGNIFICANT CHANGE UP
SARS-COV-2 RNA SPEC QL NAA+PROBE: SIGNIFICANT CHANGE UP
SODIUM SERPL-SCNC: 143 MMOL/L — SIGNIFICANT CHANGE UP (ref 135–145)
SPECIMEN SOURCE: SIGNIFICANT CHANGE UP
TROPONIN T, HIGH SENSITIVITY RESULT: 55 NG/L — HIGH (ref 0–51)
TSH SERPL-MCNC: 2.15 UIU/ML — SIGNIFICANT CHANGE UP (ref 0.27–4.2)
WBC # BLD: 10.37 K/UL — SIGNIFICANT CHANGE UP (ref 3.8–10.5)
WBC # FLD AUTO: 10.37 K/UL — SIGNIFICANT CHANGE UP (ref 3.8–10.5)

## 2021-01-13 PROCEDURE — 93970 EXTREMITY STUDY: CPT | Mod: 26

## 2021-01-13 PROCEDURE — 99223 1ST HOSP IP/OBS HIGH 75: CPT | Mod: GC

## 2021-01-13 PROCEDURE — 71250 CT THORAX DX C-: CPT | Mod: 26

## 2021-01-13 PROCEDURE — 93306 TTE W/DOPPLER COMPLETE: CPT | Mod: 26

## 2021-01-13 PROCEDURE — 99233 SBSQ HOSP IP/OBS HIGH 50: CPT | Mod: GC

## 2021-01-13 RX ORDER — INSULIN LISPRO 100/ML
12 VIAL (ML) SUBCUTANEOUS
Refills: 0 | Status: DISCONTINUED | OUTPATIENT
Start: 2021-01-13 | End: 2021-01-13

## 2021-01-13 RX ORDER — ENOXAPARIN SODIUM 100 MG/ML
50 INJECTION SUBCUTANEOUS
Qty: 0 | Refills: 0 | DISCHARGE

## 2021-01-13 RX ORDER — LANOLIN ALCOHOL/MO/W.PET/CERES
3 CREAM (GRAM) TOPICAL AT BEDTIME
Refills: 0 | Status: DISCONTINUED | OUTPATIENT
Start: 2021-01-13 | End: 2021-02-02

## 2021-01-13 RX ORDER — GLUCAGON INJECTION, SOLUTION 0.5 MG/.1ML
1 INJECTION, SOLUTION SUBCUTANEOUS ONCE
Refills: 0 | Status: DISCONTINUED | OUTPATIENT
Start: 2021-01-13 | End: 2021-02-02

## 2021-01-13 RX ORDER — BUMETANIDE 0.25 MG/ML
4 INJECTION INTRAMUSCULAR; INTRAVENOUS EVERY 12 HOURS
Refills: 0 | Status: DISCONTINUED | OUTPATIENT
Start: 2021-01-13 | End: 2021-01-17

## 2021-01-13 RX ORDER — DEXTROSE 50 % IN WATER 50 %
25 SYRINGE (ML) INTRAVENOUS ONCE
Refills: 0 | Status: DISCONTINUED | OUTPATIENT
Start: 2021-01-13 | End: 2021-02-02

## 2021-01-13 RX ORDER — INSULIN LISPRO 100/ML
VIAL (ML) SUBCUTANEOUS
Refills: 0 | Status: DISCONTINUED | OUTPATIENT
Start: 2021-01-13 | End: 2021-01-13

## 2021-01-13 RX ORDER — DEXTROSE 50 % IN WATER 50 %
12.5 SYRINGE (ML) INTRAVENOUS ONCE
Refills: 0 | Status: DISCONTINUED | OUTPATIENT
Start: 2021-01-13 | End: 2021-02-02

## 2021-01-13 RX ORDER — INSULIN GLARGINE 100 [IU]/ML
20 INJECTION, SOLUTION SUBCUTANEOUS AT BEDTIME
Refills: 0 | Status: DISCONTINUED | OUTPATIENT
Start: 2021-01-13 | End: 2021-01-13

## 2021-01-13 RX ORDER — DEXTROSE 50 % IN WATER 50 %
15 SYRINGE (ML) INTRAVENOUS ONCE
Refills: 0 | Status: DISCONTINUED | OUTPATIENT
Start: 2021-01-13 | End: 2021-02-02

## 2021-01-13 RX ORDER — METOPROLOL TARTRATE 50 MG
2.5 TABLET ORAL EVERY 6 HOURS
Refills: 0 | Status: DISCONTINUED | OUTPATIENT
Start: 2021-01-13 | End: 2021-01-13

## 2021-01-13 RX ORDER — INSULIN LISPRO 100/ML
VIAL (ML) SUBCUTANEOUS AT BEDTIME
Refills: 0 | Status: DISCONTINUED | OUTPATIENT
Start: 2021-01-13 | End: 2021-01-19

## 2021-01-13 RX ORDER — DEXTROSE 50 % IN WATER 50 %
12.5 SYRINGE (ML) INTRAVENOUS ONCE
Refills: 0 | Status: COMPLETED | OUTPATIENT
Start: 2021-01-13 | End: 2021-01-13

## 2021-01-13 RX ORDER — INSULIN LISPRO 100/ML
10 VIAL (ML) SUBCUTANEOUS
Qty: 0 | Refills: 0 | DISCHARGE

## 2021-01-13 RX ORDER — SODIUM CHLORIDE 9 MG/ML
1000 INJECTION, SOLUTION INTRAVENOUS
Refills: 0 | Status: DISCONTINUED | OUTPATIENT
Start: 2021-01-13 | End: 2021-02-02

## 2021-01-13 RX ORDER — ATORVASTATIN CALCIUM 80 MG/1
80 TABLET, FILM COATED ORAL AT BEDTIME
Refills: 0 | Status: DISCONTINUED | OUTPATIENT
Start: 2021-01-13 | End: 2021-01-13

## 2021-01-13 RX ORDER — LEVOTHYROXINE SODIUM 125 MCG
25 TABLET ORAL AT BEDTIME
Refills: 0 | Status: DISCONTINUED | OUTPATIENT
Start: 2021-01-13 | End: 2021-01-26

## 2021-01-13 RX ORDER — ATORVASTATIN CALCIUM 80 MG/1
80 TABLET, FILM COATED ORAL AT BEDTIME
Refills: 0 | Status: DISCONTINUED | OUTPATIENT
Start: 2021-01-13 | End: 2021-02-02

## 2021-01-13 RX ORDER — CLOPIDOGREL BISULFATE 75 MG/1
75 TABLET, FILM COATED ORAL DAILY
Refills: 0 | Status: DISCONTINUED | OUTPATIENT
Start: 2021-01-13 | End: 2021-02-02

## 2021-01-13 RX ORDER — METOPROLOL TARTRATE 50 MG
12.5 TABLET ORAL EVERY 12 HOURS
Refills: 0 | Status: DISCONTINUED | OUTPATIENT
Start: 2021-01-13 | End: 2021-01-18

## 2021-01-13 RX ORDER — LANOLIN ALCOHOL/MO/W.PET/CERES
3 CREAM (GRAM) TOPICAL AT BEDTIME
Refills: 0 | Status: DISCONTINUED | OUTPATIENT
Start: 2021-01-13 | End: 2021-01-13

## 2021-01-13 RX ORDER — CALAMINE AND ZINC OXIDE AND PHENOL 160; 10 MG/ML; MG/ML
1 LOTION TOPICAL THREE TIMES A DAY
Refills: 0 | Status: DISCONTINUED | OUTPATIENT
Start: 2021-01-13 | End: 2021-01-30

## 2021-01-13 RX ORDER — VANCOMYCIN HCL 1 G
750 VIAL (EA) INTRAVENOUS EVERY 24 HOURS
Refills: 0 | Status: DISCONTINUED | OUTPATIENT
Start: 2021-01-13 | End: 2021-01-13

## 2021-01-13 RX ORDER — ASPIRIN/CALCIUM CARB/MAGNESIUM 324 MG
81 TABLET ORAL DAILY
Refills: 0 | Status: DISCONTINUED | OUTPATIENT
Start: 2021-01-13 | End: 2021-02-02

## 2021-01-13 RX ORDER — INSULIN LISPRO 100/ML
10 VIAL (ML) SUBCUTANEOUS
Refills: 0 | Status: DISCONTINUED | OUTPATIENT
Start: 2021-01-13 | End: 2021-01-16

## 2021-01-13 RX ORDER — SENNA PLUS 8.6 MG/1
2 TABLET ORAL AT BEDTIME
Refills: 0 | Status: DISCONTINUED | OUTPATIENT
Start: 2021-01-13 | End: 2021-01-13

## 2021-01-13 RX ORDER — INSULIN LISPRO 100/ML
VIAL (ML) SUBCUTANEOUS AT BEDTIME
Refills: 0 | Status: DISCONTINUED | OUTPATIENT
Start: 2021-01-13 | End: 2021-01-13

## 2021-01-13 RX ORDER — DEXTROSE 50 % IN WATER 50 %
25 SYRINGE (ML) INTRAVENOUS ONCE
Refills: 0 | Status: COMPLETED | OUTPATIENT
Start: 2021-01-13 | End: 2021-01-13

## 2021-01-13 RX ORDER — METOPROLOL TARTRATE 50 MG
12.5 TABLET ORAL
Refills: 0 | Status: DISCONTINUED | OUTPATIENT
Start: 2021-01-13 | End: 2021-01-13

## 2021-01-13 RX ORDER — METOPROLOL TARTRATE 50 MG
12.5 TABLET ORAL EVERY 12 HOURS
Refills: 0 | Status: DISCONTINUED | OUTPATIENT
Start: 2021-01-13 | End: 2021-01-13

## 2021-01-13 RX ORDER — INSULIN GLARGINE 100 [IU]/ML
20 INJECTION, SOLUTION SUBCUTANEOUS AT BEDTIME
Refills: 0 | Status: DISCONTINUED | OUTPATIENT
Start: 2021-01-13 | End: 2021-01-15

## 2021-01-13 RX ORDER — BUMETANIDE 0.25 MG/ML
2 INJECTION INTRAMUSCULAR; INTRAVENOUS
Refills: 0 | Status: DISCONTINUED | OUTPATIENT
Start: 2021-01-13 | End: 2021-01-13

## 2021-01-13 RX ORDER — LEVOTHYROXINE SODIUM 125 MCG
50 TABLET ORAL AT BEDTIME
Refills: 0 | Status: DISCONTINUED | OUTPATIENT
Start: 2021-01-13 | End: 2021-01-13

## 2021-01-13 RX ORDER — CEFEPIME 1 G/1
1000 INJECTION, POWDER, FOR SOLUTION INTRAMUSCULAR; INTRAVENOUS EVERY 24 HOURS
Refills: 0 | Status: DISCONTINUED | OUTPATIENT
Start: 2021-01-13 | End: 2021-01-13

## 2021-01-13 RX ORDER — LEVOTHYROXINE SODIUM 125 MCG
50 TABLET ORAL DAILY
Refills: 0 | Status: DISCONTINUED | OUTPATIENT
Start: 2021-01-13 | End: 2021-01-13

## 2021-01-13 RX ORDER — BUMETANIDE 0.25 MG/ML
4 INJECTION INTRAMUSCULAR; INTRAVENOUS EVERY 8 HOURS
Refills: 0 | Status: DISCONTINUED | OUTPATIENT
Start: 2021-01-13 | End: 2021-01-13

## 2021-01-13 RX ORDER — INSULIN LISPRO 100/ML
VIAL (ML) SUBCUTANEOUS
Refills: 0 | Status: DISCONTINUED | OUTPATIENT
Start: 2021-01-13 | End: 2021-01-19

## 2021-01-13 RX ORDER — HEPARIN SODIUM 5000 [USP'U]/ML
5000 INJECTION INTRAVENOUS; SUBCUTANEOUS EVERY 8 HOURS
Refills: 0 | Status: DISCONTINUED | OUTPATIENT
Start: 2021-01-13 | End: 2021-02-02

## 2021-01-13 RX ADMIN — CALAMINE AND ZINC OXIDE AND PHENOL 1 APPLICATION(S): 160; 10 LOTION TOPICAL at 21:13

## 2021-01-13 RX ADMIN — ATORVASTATIN CALCIUM 80 MILLIGRAM(S): 80 TABLET, FILM COATED ORAL at 21:12

## 2021-01-13 RX ADMIN — HEPARIN SODIUM 5000 UNIT(S): 5000 INJECTION INTRAVENOUS; SUBCUTANEOUS at 06:22

## 2021-01-13 RX ADMIN — CEFEPIME 100 MILLIGRAM(S): 1 INJECTION, POWDER, FOR SOLUTION INTRAMUSCULAR; INTRAVENOUS at 04:40

## 2021-01-13 RX ADMIN — Medication 81 MILLIGRAM(S): at 11:40

## 2021-01-13 RX ADMIN — Medication 2.5 MILLIGRAM(S): at 06:22

## 2021-01-13 RX ADMIN — HEPARIN SODIUM 5000 UNIT(S): 5000 INJECTION INTRAVENOUS; SUBCUTANEOUS at 21:13

## 2021-01-13 RX ADMIN — BUMETANIDE 2 MILLIGRAM(S): 0.25 INJECTION INTRAMUSCULAR; INTRAVENOUS at 06:22

## 2021-01-13 RX ADMIN — CALAMINE AND ZINC OXIDE AND PHENOL 1 APPLICATION(S): 160; 10 LOTION TOPICAL at 06:22

## 2021-01-13 RX ADMIN — Medication 10 UNIT(S): at 18:11

## 2021-01-13 RX ADMIN — Medication 12.5 MILLIGRAM(S): at 16:46

## 2021-01-13 RX ADMIN — Medication 1 APPLICATION(S): at 11:41

## 2021-01-13 RX ADMIN — Medication 1 APPLICATION(S): at 18:12

## 2021-01-13 RX ADMIN — Medication 25 MICROGRAM(S): at 21:13

## 2021-01-13 RX ADMIN — Medication 25 GRAM(S): at 03:57

## 2021-01-13 RX ADMIN — HEPARIN SODIUM 5000 UNIT(S): 5000 INJECTION INTRAVENOUS; SUBCUTANEOUS at 14:26

## 2021-01-13 RX ADMIN — CLOPIDOGREL BISULFATE 75 MILLIGRAM(S): 75 TABLET, FILM COATED ORAL at 11:40

## 2021-01-13 RX ADMIN — CALAMINE AND ZINC OXIDE AND PHENOL 1 APPLICATION(S): 160; 10 LOTION TOPICAL at 14:26

## 2021-01-13 RX ADMIN — BUMETANIDE 132 MILLIGRAM(S): 0.25 INJECTION INTRAMUSCULAR; INTRAVENOUS at 14:27

## 2021-01-13 RX ADMIN — Medication 3 MILLIGRAM(S): at 21:12

## 2021-01-13 RX ADMIN — Medication 12.5 GRAM(S): at 03:32

## 2021-01-13 NOTE — PROGRESS NOTE ADULT - ASSESSMENT
75y.o F Faroese speaking h/o HTN, HLD, DM, CAD s/p CABG 2014 and prior PCI, severe mitral regurgitation (s/p mitral clip 2019), pulmonary HTN (TTE 2019), HF (EF 21 % June 2019), CKD IV not on dialysis (b/l SCr 2-2.2), hypothyroidism BIBEMS 2/2 worsening work of breathing likely secondary to PNA and/or heart failure/cardiac origin.   75y.o F Luxembourgish speaking h/o HTN, HLD, DM, CAD s/p CABG 2014 and prior PCI, severe mitral regurgitation (s/p mitral clip 2019), pulmonary HTN (TTE 2019), HF (EF 21 % June 2019), CKD IV not on dialysis (b/l SCr 2-2.2), hypothyroidism BIBEMS 2/2 worsening work of breathing, found to have crackles, elevated proBNP and b/l pulmonary edema on CXR c/w acute exacerbation of CHF.

## 2021-01-13 NOTE — PROGRESS NOTE ADULT - PROBLEM SELECTOR PLAN 1
Patient describes increased work of breathing and tachypnea, does not appear to be hypercarbic or hypoxic  CXR: Bilateral predominantly lower lobe hazy opacities. Pneumonia cannot be ruled out.  sx Could be 2/2 pneumonia and/or worsening heart function  Leukocytosis 10.97, elevated CRP 0.63, lactate on VBG 2.7, COVID neg  Elevated pro-BNP 5178  s/p 1 L NS, 1 dose Vanco, and 1 dose Zosyn  Plan:   - broad spectrum abx: Vanco , Cefepime renally dosed   - follow blood cx, urine cx, sputum cx if patient produces sputum   - urine legionella  - TTE  - cards consult  - trend leukocytosis, lactate  - currently on BiPAP, will downgrade appropriate, currently gets tachypneic off BiPap  - low suspicion to PE, no tachycardia or hypoxia, hold off on CTA w/ CT given CKD  - CT chest no contrast supine Patient describes increased work of breathing and tachypnea, does not appear to be hypercarbic or hypoxic. However, CXR shows Bilateral predominantly lower lobe hazy opacities with elevated pro-BNP 5178  -Most likely due to worsening HF   -Started bumex 4 mg BID   - C/w metoprolol 12.5 mg BID. No ACE-i or ARBs at home    - F/u with TTE  - Cardiology consulted for GDMT  - Low suspicion for PNA. No clinical signs of PNA.   - F/u with blood culture, urine culture and sputum cx.   - Monitor off abx for now   - CT chest no contrast

## 2021-01-13 NOTE — CONSULT NOTE ADULT - ASSESSMENT
#Eczematous dermatitis  Patient with diffuse eczematous dermatitis x 2 months. Unsure of exact etiology but may represent hypersensitivity reaction.   Not consistent with drug exanthem  Start clobetasol 0.05% ointment BID to affected areas. Avoid use on face or groin.  Although no burrows or scabies mites appreciated, in these patients it is often beneficial to treat empirically with permethrin 5% cream, apply to whole body from neck down at bedtime x 1 night and rinse off in morning (8-10 hours later). Repeat again in 1 week    Omer Sanchez MD  Resident Physician, PGY3  [facility]  Pager: [phone]  Office: [phone]    The patient's chart was reviewed in addition to being seen and examined at bedside with the dermatology attending Dr. Carroll  Recommendations were communicated with the primary team.  Please page [phone] for further related questions.     #Eczematous dermatitis  Patient with diffuse eczematous dermatitis x 2 months. Unsure of exact etiology but may represent hypersensitivity reaction.   Not consistent with drug exanthem  Start clobetasol 0.05% ointment BID to affected areas. Avoid use on face or groin.  Although no burrows or scabies mites appreciated, in these patients it is often beneficial to treat empirically with permethrin 5% cream, apply to whole body from neck down at bedtime x 1 night and rinse off in morning (8-10 hours later). Repeat again in 1 week    Omer Sanchez MD  Resident Physician, PGY3  [facility]  Pager: [phone]  Office: [phone]    The patient's chart was reviewed in addition to being seen and examined at bedside with the dermatology attending Dr. Carroll  Recommendations were communicated with the primary team.  Please page [phone] for further related questions.

## 2021-01-13 NOTE — CONSULT NOTE ADULT - ASSESSMENT
76 yo F w/ PMH of CKD stage 4 (baseline Cr 1.9-2.2) HTN, T2DM, CAD s/p CABG X3 (2014 at [facility]), non-dilated ICM (EF 20-25%), severe mitral regurgitation s/p mitral clip (6/13/19), severe pulm HTN, hypothyroidism who presented for evaluation of SOB and was admitted for ADHF.    MERVIN on CKD stage 4  - baseline Cr 1.9-2.2; has had recurrent MERVIN's over the years  - CKD is likely 2/2 recurrent MERVIN's + underlying DM/HTN  - Scr currently stable   - Agree w. IV diuretics   - renal sonogram in the past with simple renal cyst  - Avoid nephrotoxins including NSAIDs, IV contrast (if possible), [employer] products  - monitor I/O's accurately    HTN  BP controlled    Anemia  Hb acceptable     Proteinuria/Hematuria  - likely from underlying DM  - has 1.8 grams proteinuria  - Hep B, Hep C, HIV RF, C3, C4, p-ANCA, c-ANCA, RPR neg  - weak gamma-migrating protein, weak IgG lambda protein band noted in the past. Pt to follow w/ heme   - DEAN 1:320 positive  - RPR neg, FTA +      76 yo F w/ PMH of CKD stage 4 (baseline Cr 1.9-2.2) HTN, T2DM, CAD s/p CABG X3 (2014 at [facility]), non-dilated ICM (EF 20-25%), severe mitral regurgitation s/p mitral clip (6/13/19), severe pulm HTN, hypothyroidism who presented for evaluation of SOB and was admitted for ADHF.    CKD stage 4  - baseline Cr 1.9-2.2; has had recurrent MERVIN's over the years  - CKD is likely 2/2 recurrent MERVIN's + underlying DM/HTN  - Scr currently stable   - Agree w. IV diuretics   - renal sonogram in the past with simple renal cyst  - Avoid nephrotoxins including NSAIDs, IV contrast (if possible), [employer] products  - monitor I/O's accurately    HTN  BP controlled    Anemia  Hb acceptable     Proteinuria/Hematuria  - likely from underlying DM  - has 1.8 grams proteinuria  - Hep B, Hep C, HIV RF, C3, C4, p-ANCA, c-ANCA, RPR neg  - weak gamma-migrating protein, weak IgG lambda protein band noted in the past. Pt to follow w/ heme   - DEAN 1:320 positive  - RPR neg, FTA +

## 2021-01-13 NOTE — PATIENT PROFILE ADULT - HOW PATIENT ADDRESSED, PROFILE
Nuzhat
HTN (hypertension)

## 2021-01-13 NOTE — PROGRESS NOTE ADULT - PROBLEM SELECTOR PLAN 6
On Levothyroxine 50 mcg daily    Plan:   - Order TSH  - continue levothyroxine 25 mcg IV daily (since patient NPO) -c/w Levothyroxine 50 mcg daily

## 2021-01-13 NOTE — PROGRESS NOTE ADULT - PROBLEM SELECTOR PLAN 2
TTE from June 2019 shows EF 21 %, global LV dysfunction  Presenting sx may be 2/2 to ADHF   pro-BNP 5178, troponin 56 with no chest pain  No LE edema, but decreased breath sounds at bases, appears euvolemic on exam  Home meds: metoprolol tartrate 12.5 mg bid, midodrine 10 mg TID, atorvastatin 80 mg daily, ASA 81 mg daily, plavix 75 mg daily, bumex 2 mg bid    Plan:   - continue ASA when off BiPAP  - continue plavix when off BiPAP  - continue metoprolol 12.5 mg bid (converted to 2.5 q6h IV push)  - continue Bumex 2mg IV bid   - TTE  - Cards consult in AM   - will give midodrine as needed (10 mg q8h)   - Strict I/O and daily weights   - admitted to tele floor   - repeat trops with AM labs -Clinical signs of volume overload. CXR shows pulmonary edema and elevated proBNP.   -Last TTE showed EF of 21% with global LV dysfunction  -C/w metoprolol 12.5 mg BID and Bumex 4 mg BID   -Cardiology consulted for GDMT  -F/u with repeat TTE

## 2021-01-13 NOTE — PROGRESS NOTE ADULT - SUBJECTIVE AND OBJECTIVE BOX
Patient is a 75y old  Female who presents with a chief complaint of increased work of breathing (2021 23:20)      SUBJECTIVE / OVERNIGHT EVENTS:          MEDICATIONS  (STANDING):  aspirin enteric coated 81 milliGRAM(s) Oral daily  atorvastatin 80 milliGRAM(s) Oral at bedtime  buMETAnide Injectable 2 milliGRAM(s) IV Push two times a day  calamine/zinc oxide Lotion 1 Application(s) Topical three times a day  cefepime   IVPB 1000 milliGRAM(s) IV Intermittent every 24 hours  clopidogrel Tablet 75 milliGRAM(s) Oral daily  dextrose 40% Gel 15 Gram(s) Oral once  dextrose 5%. 1000 milliLiter(s) (50 mL/Hr) IV Continuous <Continuous>  dextrose 5%. 1000 milliLiter(s) (100 mL/Hr) IV Continuous <Continuous>  dextrose 50% Injectable 25 Gram(s) IV Push once  dextrose 50% Injectable 12.5 Gram(s) IV Push once  dextrose 50% Injectable 25 Gram(s) IV Push once  glucagon  Injectable 1 milliGRAM(s) IntraMuscular once  heparin   Injectable 5000 Unit(s) SubCutaneous every 8 hours  insulin lispro (ADMELOG) corrective regimen sliding scale   SubCutaneous three times a day before meals  insulin lispro (ADMELOG) corrective regimen sliding scale   SubCutaneous at bedtime  levothyroxine Injectable 25 MICROGram(s) IV Push at bedtime  metoprolol tartrate Injectable 2.5 milliGRAM(s) IV Push every 6 hours  triamcinolone 0.1% Ointment 1 Application(s) Topical every 12 hours  vancomycin  IVPB 750 milliGRAM(s) IV Intermittent every 24 hours    MEDICATIONS  (PRN):      Vital Signs Last 24 Hrs  T(C): 36.5 (2021 08:07), Max: 37.4 (2021 17:38)  T(F): 97.7 (2021 08:07), Max: 99.3 (2021 17:38)  HR: 84 (2021 08:07) (77 - 104)  BP: 115/71 (2021 08:07) (107/70 - 174/77)  BP(mean): 87 (2021 23:20) (87 - 88)  RR: 19 (2021 08:07) (19 - 40)  SpO2: 100% (2021 08:07) (95% - 100%)      PHYSICAL EXAM  GENERAL: NAD, well-developed  HEAD:  Atraumatic, Normocephalic  EYES: EOMI, PERRLA, conjunctiva and sclera clear  NECK: Supple, No JVD  CHEST/LUNG: Clear to auscultation bilaterally; No wheeze  HEART: Regular rate and rhythm; No murmurs, rubs, or gallops  ABDOMEN: Soft, Nontender, Nondistended; Bowel sounds present  EXTREMITIES:  2+ Peripheral Pulses, No clubbing, cyanosis, or edema  PSYCH: AAOx3  SKIN: No rashes or lesions    CAPILLARY BLOOD GLUCOSE      POCT Blood Glucose.: 107 mg/dL (2021 04:27)  POCT Blood Glucose.: 134 mg/dL (2021 04:13)  POCT Blood Glucose.: 95 mg/dL (2021 03:54)  POCT Blood Glucose.: 52 mg/dL (2021 03:23)  POCT Blood Glucose.: 53 mg/dL (2021 03:21)    I&O's Summary      LABS:                        10.3   10.37 )-----------( 330      ( 2021 05:14 )             34.3     01-13    143  |  109<H>  |  55<H>  ----------------------------<  83  3.6   |  20<L>  |  2.07<H>    Ca    9.0      2021 05:14  Phos  4.8       Mg     2.5         TPro  8.0  /  Alb  4.0  /  TBili  0.5  /  DBili  x   /  AST  31  /  ALT  21  /  AlkPhos  103  01-13    PT/INR - ( 2021 17:42 )   PT: 13.7 sec;   INR: 1.15 ratio         PTT - ( 2021 17:42 )  PTT:52.2 sec      Urinalysis Basic - ( 2021 19:28 )    Color: Light Yellow / Appearance: Clear / S.011 / pH: x  Gluc: x / Ketone: Negative  / Bili: Negative / Urobili: Negative   Blood: x / Protein: 30 mg/dL / Nitrite: Negative   Leuk Esterase: Moderate / RBC: 1 /hpf / WBC 5 /HPF   Sq Epi: x / Non Sq Epi: 1 /hpf / Bacteria: Negative        RADIOLOGY & ADDITIONAL TESTS:     MICROBIOLOGY:    ANTIMICROBIALS:    CONSULTS: Patient is a 75y old  Female who presents with a chief complaint of increased work of breathing (2021 23:20)      SUBJECTIVE / OVERNIGHT EVENTS:    No acute events overnight. Patient was on BIPAP this morning and then transitioned to nasal canula. Patient reports that she has been having worsening SOB at rest and with lying down. She has intermittent chest pain at CABG site but it is not usually related to exertion. She denies any fever, chills, cough, sputum production or wheezing. She denies any recent sick contacts.       MEDICATIONS  (STANDING):  aspirin enteric coated 81 milliGRAM(s) Oral daily  atorvastatin 80 milliGRAM(s) Oral at bedtime  buMETAnide Injectable 2 milliGRAM(s) IV Push two times a day  calamine/zinc oxide Lotion 1 Application(s) Topical three times a day  cefepime   IVPB 1000 milliGRAM(s) IV Intermittent every 24 hours  clopidogrel Tablet 75 milliGRAM(s) Oral daily  dextrose 40% Gel 15 Gram(s) Oral once  dextrose 5%. 1000 milliLiter(s) (50 mL/Hr) IV Continuous <Continuous>  dextrose 5%. 1000 milliLiter(s) (100 mL/Hr) IV Continuous <Continuous>  dextrose 50% Injectable 25 Gram(s) IV Push once  dextrose 50% Injectable 12.5 Gram(s) IV Push once  dextrose 50% Injectable 25 Gram(s) IV Push once  glucagon  Injectable 1 milliGRAM(s) IntraMuscular once  heparin   Injectable 5000 Unit(s) SubCutaneous every 8 hours  insulin lispro (ADMELOG) corrective regimen sliding scale   SubCutaneous three times a day before meals  insulin lispro (ADMELOG) corrective regimen sliding scale   SubCutaneous at bedtime  levothyroxine Injectable 25 MICROGram(s) IV Push at bedtime  metoprolol tartrate Injectable 2.5 milliGRAM(s) IV Push every 6 hours  triamcinolone 0.1% Ointment 1 Application(s) Topical every 12 hours  vancomycin  IVPB 750 milliGRAM(s) IV Intermittent every 24 hours    MEDICATIONS  (PRN):      Vital Signs Last 24 Hrs  T(C): 36.5 (2021 08:07), Max: 37.4 (2021 17:38)  T(F): 97.7 (2021 08:07), Max: 99.3 (2021 17:38)  HR: 84 (2021 08:07) (77 - 104)  BP: 115/71 (2021 08:07) (107/70 - 174/77)  BP(mean): 87 (2021 23:20) (87 - 88)  RR: 19 (2021 08:07) (19 - 40)  SpO2: 100% (2021 08:07) (95% - 100%)      PHYSICAL EXAM  GENERAL: NAD, well-developed  HEAD:  Atraumatic, Normocephalic  EYES: Conjunctiva and sclera clear  NECK: Supple, No JVD  CHEST/LUNG: Bibasilar crackles. No wheeze  HEART: Regular rate and rhythm; No murmurs, rubs, or gallops  ABDOMEN: Soft, Nontender, Nondistended; Bowel sounds present  EXTREMITIES:  2+ Peripheral Pulses, No clubbing, cyanosis, or edema  PSYCH: AAOx3  SKIN: Urticarial rash on UE and on back     CAPILLARY BLOOD GLUCOSE      POCT Blood Glucose.: 107 mg/dL (2021 04:27)  POCT Blood Glucose.: 134 mg/dL (2021 04:13)  POCT Blood Glucose.: 95 mg/dL (2021 03:54)  POCT Blood Glucose.: 52 mg/dL (2021 03:23)  POCT Blood Glucose.: 53 mg/dL (2021 03:21)    I&O's Summary      LABS:                        10.3   10.37 )-----------( 330      ( 2021 05:14 )             34.3     -13    143  |  109<H>  |  55<H>  ----------------------------<  83  3.6   |  20<L>  |  2.07<H>    Ca    9.0      2021 05:14  Phos  4.8     -  Mg     2.5         TPro  8.0  /  Alb  4.0  /  TBili  0.5  /  DBili  x   /  AST  31  /  ALT  21  /  AlkPhos  103  01-13    PT/INR - ( 2021 17:42 )   PT: 13.7 sec;   INR: 1.15 ratio         PTT - ( 2021 17:42 )  PTT:52.2 sec      Urinalysis Basic - ( 2021 19:28 )    Color: Light Yellow / Appearance: Clear / S.011 / pH: x  Gluc: x / Ketone: Negative  / Bili: Negative / Urobili: Negative   Blood: x / Protein: 30 mg/dL / Nitrite: Negative   Leuk Esterase: Moderate / RBC: 1 /hpf / WBC 5 /HPF   Sq Epi: x / Non Sq Epi: 1 /hpf / Bacteria: Negative

## 2021-01-13 NOTE — PROGRESS NOTE ADULT - ATTENDING COMMENTS
75F w/ PMHx of severe ICM (EF 20%), severe MR s/p mitraclip, HTN, HLD, DM2, CKD b/l CR~2 p/w sob. Suspect acute on chronic chf exacerbation. PNA less likely, can dc abx. She has been poorly tolerant of ACEi/ARB and MRA in the past due to hypotension. Will inc bumex to 4 bid and monitor UOP. Goal 1-2 L negative. Strict is/os and daily. Will consult cards. If unable to diurese may need inotrope assistance.    #acute on chronic HFrEF  -ECG NSR, pvcs, RA deviation, diffuse TWIs and ST deppressions in precordial leads  -suspect trop is demand in setting of CHF excacerbation  -she is warm at this time - c/w home metoprolol  -bumex 4 IV bid  -tele  -repeat TTE  -cards consult  -trend LFTs daily  -strict is/os and daily standing weights    #DM2  -she is hypoglycemic and has not received lantus  -will reduce lantus dose given poor PO and uptitrate as needed    Tor Newberry MD  Division of Hospital Medicine  Pager: [phone]  Office: [phone] 75F w/ PMHx of severe ICM (EF 20%), severe MR s/p mitraclip, HTN, HLD, DM2, CKD b/l CR~2 p/w sob. Suspect acute on chronic chf exacerbation. PNA less likely, can dc abx. She has been poorly tolerant of ACEi/ARB and MRA in the past due to hypotension. Will inc bumex to 4 bid and monitor UOP. Goal 1-2 L negative. Strict is/os and daily. Will consult cards. If unable to diurese may need inotrope assistance.    #acute on chronic HFrEF  -ECG NSR, pvcs, RA deviation, diffuse TWIs and ST deppressions in precordial leads  -suspect trop is demand in setting of CHF excacerbation  -she is warm at this time - c/w home metoprolol  -bumex 4 IV bid  -tele  -repeat TTE  -cards consult  -trend LFTs daily  -strict is/os and daily standing weights    #DM2  -she is hypoglycemic and has not received lantus  -will reduce lantus dose given poor PO and uptitrate as needed    #rash  -diffuse erythematous, raised pruritic papules ?papular urticaria ?severe eczema  -start steroid cream, hydroxycine prn    Tor Newberry MD  Division of Hospital Medicine  Pager: [phone]  Office: [phone]

## 2021-01-13 NOTE — PROGRESS NOTE ADULT - PROBLEM SELECTOR PLAN 3
Appears to be 2/2 venous stasis on PE  However pain is increased with walking with +calf pain    Plan:   - DVT studies  - EMI/PVR to rule out PAD Raised urticarial rash over UE and back   -Derm consulted   -C/w topical steroids for now

## 2021-01-13 NOTE — CONSULT NOTE ADULT - SUBJECTIVE AND OBJECTIVE BOX
HISTORY OF PRESENT ILLNESS: HPI:  75yF Tam[place] speaking h/o HTN, HLD, DM, CAD s/p CABG 2014 and prior PCI, severe mitral regurgitation (s/p mitral clip 2019), pulmonary HTN, HF (EF 21 % June 2019), CKD IV not on dialysis (b/l SCr 2-2.2), hypothyroidism BIBEMS 2/2 worsening work of breathing.   Per patient and daughter, patient has been experiencing increased work of breathing over the past few weeks, which has been getting progressively worse, being the worst yesterday causing family to call EMS. Patient states she "feels tired from breathing" and small activities such as eating or walking around her house make her very weak.   Patient denies any associated chest pain, nausea, vomiting, abdominal pain, diarrhea, constipation. Patient denies fevers, rhinorrhea, throat pain, sore throat, cough, sick contacts, COVID contacts, patient has been staying home for most of the time. Patient does endorse full body itching chronically and chronic RLE pain exacerbated with walking.   Patient's daughter and caretaker states that patient has been progressively deteriating. Cognitively she is all there however she gets weak with small activities such as walking or eating. Daughter states that patient appears to be weaker than during her last hospital admission in 2019, where her EF was estimated to be 21 % on TTE. Daughter's concern is that more than infection, this may be worsening of her mother's heart condition.                                                                                                   Per EMS: Patient found to have increase work of breathing at home sating 96 RA and given 3L NC en route.   Of note patient was admitted to CCU for acute decompensated HF in Oct 2019 with elevated trops to 1800, patient was diuresed, became hypotensive, eventually weaned off pressors and discharged on midodrine. TTE during that time (June 2019) showed showed EF 21%, Severe global left ventricular systolic dysfunction. Mild-moderate tricuspid regurgitation, and severe pulm hypertension.     Seen in ED, on BIPAP.  Feeling more relaxed but in general still too weak to have a full conversation.  She states that she has been feeling worse: more short of breath, too fatigued for self-care- over the last 3 months.  Has less appetite but may have gained weight.  No leg edema.  Has orthopnea and PND.  No palpitations. No pre-syncope or fainting.  No angina.  States she has not run out of any prescriptions.  A 10 pt ROS is otherwise negative.      She has been seen in our office and has refused ICD implant for primary prevention of sudden cardiac arrest on numerous prior occasions.    On prior hospitalizations, has required balloon pump assist for diuresis.  Her blood pressure is apparently very labile and she in on next-to-no GDMT... just a very small dose of beta blockers, and even using Midodrine to keep her BP up.  Has only tried Isosorbide briefly and it was discontinued.    PAST MEDICAL & SURGICAL HISTORY:  H/O pulmonary hypertension    Heart failure    CAD (coronary artery disease)  s/p CABG and PCI    Hypothyroidism    Hyperlipidemia    Diabetes mellitus, type 2    S/P mitral valve clip implantation    S/P CABG (coronary artery bypass graft)    MEDICATIONS  (STANDING):  aspirin enteric coated 81 milliGRAM(s) Oral daily  atorvastatin 80 milliGRAM(s) Oral at bedtime  buMETAnide IVPB 4 milliGRAM(s) IV Intermittent every 12 hours  calamine/zinc oxide Lotion 1 Application(s) Topical three times a day  clopidogrel Tablet 75 milliGRAM(s) Oral daily  dextrose 40% Gel 15 Gram(s) Oral once  dextrose 5%. 1000 milliLiter(s) (50 mL/Hr) IV Continuous <Continuous>  dextrose 5%. 1000 milliLiter(s) (100 mL/Hr) IV Continuous <Continuous>  dextrose 50% Injectable 25 Gram(s) IV Push once  dextrose 50% Injectable 12.5 Gram(s) IV Push once  dextrose 50% Injectable 25 Gram(s) IV Push once  glucagon  Injectable 1 milliGRAM(s) IntraMuscular once  heparin   Injectable 5000 Unit(s) SubCutaneous every 8 hours  insulin glargine Injectable (LANTUS) 20 Unit(s) SubCutaneous at bedtime  insulin lispro (ADMELOG) corrective regimen sliding scale   SubCutaneous three times a day before meals  insulin lispro (ADMELOG) corrective regimen sliding scale   SubCutaneous at bedtime  insulin lispro Injectable (ADMELOG) 10 Unit(s) SubCutaneous three times a day before meals  levothyroxine Injectable 25 MICROGram(s) IV Push at bedtime  metoprolol tartrate 12.5 milliGRAM(s) Oral every 12 hours  triamcinolone 0.1% Ointment 1 Application(s) Topical every 12 hours      Allergies    azithromycin (Hives; Pruritus)    Intolerances        FAMILY HISTORY:  Family history of hypertension (Sibling)  sister    Family history of diabetes mellitus (Sibling)  brothers/sisters      Non-contributary for premature coronary disease or sudden cardiac death    SOCIAL HISTORY:    [ x] Non-smoker  [ ] Smoker  [ ] Alcohol    PHYSICAL EXAM:  T(C): 36.3 (01-13-21 @ 15:37), Max: 37.4 (01-12-21 @ 17:38)  HR: 100 (01-13-21 @ 16:08) (77 - 104)  BP: 117/73 (01-13-21 @ 15:37) (107/70 - 174/77)  RR: 18 (01-13-21 @ 15:37) (18 - 40)  SpO2: 96% (01-13-21 @ 16:08) (95% - 100%)  Wt(kg): --    General: thin frail elderly woman in no acute distress, but fatigued.    Head: normocephalic, no trauma, on BIPAP  Neck: no JVD, no bruit, supple, not enlarged  CV: S1S2, no S3, regular rate, rhythm is SINUS, no murmurs.    Lungs: poor air entry to bases, rales BL, no wheezing  Abdomen: bowel sounds +, soft, nontender, nondistended  Extremities: no clubbing, cyanosis or edema  Neuro: Moves all 4 extremities, sensation intact x 4 extremities  Skin: warm and moist, normal turgor  Psych: Mood and affect are appropriate for circumstances  MSK: normal range of motion and strength x4 extremities.    TELEMETRY: NSR, iRBBB 	    ECG:  NSR,. iRBBB.  Echo: 2019 21% EF, severe PHTN, normal LA size, s/p MitraClip  NST: 2018. Prior LCx territory MI.  	  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    Continue BID IV high-dose Bumex.  Can try lower doses as early as tomorrow (2mg, to see how much it takes to actually produce urine).  Get Echo before the weekend after some diuresis has taken place.  Replace K to 4-4.5 (vigorously, in anticipation of diuresis), and keep Mg 2.  Telemetry.    Prior to this hospitalization, she was taking 1mg Bumex AM and 0.5mg two more times during the day.  I have a low threshold to request starting inotropes for management of her heart failure, given lack of ability to tolerate GDMT and frequent admissions.    RHC at some point on this admission, once closer to euvolemic.      Rob Connor M.D.  Cardiac Electrophysiology    office [phone]  pager [phone]

## 2021-01-13 NOTE — PROGRESS NOTE ADULT - PROBLEM SELECTOR PLAN 5
On home insulin: humalog 18 pre-meals and Lantus 60 at bedtime (daughter says also takes lantus 20 in the AM)    Plan:   - ISS moderate, adjust as needed  - follow HgbA1c -Takes 60 units of lantus at bedtime and 18 units of humalog   -Will dose lantus based on current FS   -C/w 10 units admelog premeal

## 2021-01-13 NOTE — CONSULT NOTE ADULT - SUBJECTIVE AND OBJECTIVE BOX
HPI:  75yF Luxembourgish speaking h/o HTN, HLD, DM, CAD s/p CABG  and prior PCI, severe mitral regurgitation (s/p mitral clip ), pulmonary HTN, HF (EF 21 % 2019), CKD IV not on dialysis (b/l SCr 2-2.2), hypothyroidism BIBEMS 2/2 worsening work of breathing.   P  er patient and daughter, patient has been experiencing increased work of breathing over the past few weeks, which has been getting progressively worse, being the worst yesterday causing family to call EMS. Admitted for work up of respiratory failure.    Found to have pruritic rash all over body x 2 months. Denies new medications. Denies eye symptoms, mouth sores, mouth pain or trouble swallowing. Lives at home with daughter who is not itchy.     PAST MEDICAL & SURGICAL HISTORY:  H/O pulmonary hypertension    Heart failure    CAD (coronary artery disease)  s/p CABG and PCI    Hypothyroidism    Hyperlipidemia    Diabetes mellitus, type 2    S/P mitral valve clip implantation    S/P CABG (coronary artery bypass graft)        REVIEW OF SYSTEMS      General: no fevers/chills, no lethargy    Skin/Breast: see HPI  	  Ophthalmologic: no eye pain or change in vision  	  ENMT: no dysphagia or change in hearing    Respiratory and Thorax: +SOB but no cough  	  Cardiovascular: no palpitations or chest pain    Gastrointestinal: no abdominal pain or blood in stool     Genitourinary: no dysuria or frequency    Musculoskeletal: no joint pains or weakness	    Neurological: no weakness, numbness , or tingling    MEDICATIONS  (STANDING):  aspirin enteric coated 81 milliGRAM(s) Oral daily  atorvastatin 80 milliGRAM(s) Oral at bedtime  buMETAnide IVPB 4 milliGRAM(s) IV Intermittent every 12 hours  calamine/zinc oxide Lotion 1 Application(s) Topical three times a day  clopidogrel Tablet 75 milliGRAM(s) Oral daily  dextrose 40% Gel 15 Gram(s) Oral once  dextrose 5%. 1000 milliLiter(s) (50 mL/Hr) IV Continuous <Continuous>  dextrose 5%. 1000 milliLiter(s) (100 mL/Hr) IV Continuous <Continuous>  dextrose 50% Injectable 25 Gram(s) IV Push once  dextrose 50% Injectable 12.5 Gram(s) IV Push once  dextrose 50% Injectable 25 Gram(s) IV Push once  glucagon  Injectable 1 milliGRAM(s) IntraMuscular once  heparin   Injectable 5000 Unit(s) SubCutaneous every 8 hours  insulin glargine Injectable (LANTUS) 20 Unit(s) SubCutaneous at bedtime  insulin lispro (ADMELOG) corrective regimen sliding scale   SubCutaneous three times a day before meals  insulin lispro (ADMELOG) corrective regimen sliding scale   SubCutaneous at bedtime  insulin lispro Injectable (ADMELOG) 10 Unit(s) SubCutaneous three times a day before meals  levothyroxine Injectable 25 MICROGram(s) IV Push at bedtime  metoprolol tartrate 12.5 milliGRAM(s) Oral every 12 hours  triamcinolone 0.1% Ointment 1 Application(s) Topical every 12 hours    MEDICATIONS  (PRN):      Allergies    azithromycin (Hives; Pruritus)    Intolerances        SOCIAL HISTORY:    FAMILY HISTORY:  Family history of hypertension (Sibling)  sister    Family history of diabetes mellitus (Sibling)  brothers/sisters        Vital Signs Last 24 Hrs  T(C): 36.4 (2021 16:42), Max: 36.6 (2021 19:26)  T(F): 97.5 (2021 16:42), Max: 97.9 (2021 19:26)  HR: 106 (2021 16:42) (77 - 106)  BP: 127/67 (2021 16:42) (107/70 - 174/77)  BP(mean): 87 (2021 23:20) (87 - 88)  RR: 22 (2021 16:42) (18 - 40)  SpO2: 100% (2021 16:42) (95% - 100%)    PHYSICAL EXAM:     The patient was alert and oriented X 3, with significantly increased WOB on NC  OP showed no ulcerations  There was no visible lymphadenopathy.  Conjunctiva were non injected  There was no clubbing or edema of extremities.  The scalp, hair, face, eyebrows, lips, OP, neck, chest, back,   extremities X 4, nails were examined.  There was no hyperhidrosis or bromhidrosis.    Of note on skin exam:   Eczematous patches with overlying linear excoriations on hands, arms, legs, chest and back  No burrows or mites appreciated     LABS:                        10.3   10.37 )-----------( 330      ( 2021 05:14 )             34.3     01-13    143  |  109<H>  |  55<H>  ----------------------------<  83  3.6   |  20<L>  |  2.07<H>    Ca    9.0      2021 05:14  Phos  4.8       Mg     2.5         TPro  8.0  /  Alb  4.0  /  TBili  0.5  /  DBili  x   /  AST  31  /  ALT  21  /  AlkPhos  103      PT/INR - ( 2021 17:42 )   PT: 13.7 sec;   INR: 1.15 ratio         PTT - ( 2021 17:42 )  PTT:52.2 sec  Urinalysis Basic - ( 2021 19:28 )    Color: Light Yellow / Appearance: Clear / S.011 / pH: x  Gluc: x / Ketone: Negative  / Bili: Negative / Urobili: Negative   Blood: x / Protein: 30 mg/dL / Nitrite: Negative   Leuk Esterase: Moderate / RBC: 1 /hpf / WBC 5 /HPF   Sq Epi: x / Non Sq Epi: 1 /hpf / Bacteria: Negative        RADIOLOGY & ADDITIONAL STUDIES:           HPI:  75yF Japanese speaking h/o HTN, HLD, DM, CAD s/p CABG  and prior PCI, severe mitral regurgitation (s/p mitral clip ), pulmonary HTN, HF (EF 21 % 2019), CKD IV not on dialysis (b/l SCr 2-2.2), hypothyroidism BIBEMS 2/2 worsening work of breathing.   P  er patient and daughter, patient has been experiencing increased work of breathing over the past few weeks, which has been getting progressively worse, being the worst yesterday causing family to call EMS. Admitted for work up of respiratory failure.    Found to have pruritic rash all over body x 2 months. Denies new medications. Denies eye symptoms, mouth sores, mouth pain or trouble swallowing. Lives at home with daughter who is not itchy.     PAST MEDICAL & SURGICAL HISTORY:  H/O pulmonary hypertension    Heart failure    CAD (coronary artery disease)  s/p CABG and PCI    Hypothyroidism    Hyperlipidemia    Diabetes mellitus, type 2    S/P mitral valve clip implantation    S/P CABG (coronary artery bypass graft)        REVIEW OF SYSTEMS      General: no fevers/chills, no lethargy    Skin/Breast: see HPI  	  Ophthalmologic: no eye pain or change in vision  	  ENMT: no dysphagia or change in hearing    Respiratory and Thorax: +SOB but no cough  	  Cardiovascular: no palpitations or chest pain    Gastrointestinal: no abdominal pain or blood in stool     Genitourinary: no dysuria or frequency    Musculoskeletal: no joint pains or weakness	    Neurological: no weakness, numbness , or tingling    MEDICATIONS  (STANDING):  aspirin enteric coated 81 milliGRAM(s) Oral daily  atorvastatin 80 milliGRAM(s) Oral at bedtime  buMETAnide IVPB 4 milliGRAM(s) IV Intermittent every 12 hours  calamine/zinc oxide Lotion 1 Application(s) Topical three times a day  clopidogrel Tablet 75 milliGRAM(s) Oral daily  dextrose 40% Gel 15 Gram(s) Oral once  dextrose 5%. 1000 milliLiter(s) (50 mL/Hr) IV Continuous <Continuous>  dextrose 5%. 1000 milliLiter(s) (100 mL/Hr) IV Continuous <Continuous>  dextrose 50% Injectable 25 Gram(s) IV Push once  dextrose 50% Injectable 12.5 Gram(s) IV Push once  dextrose 50% Injectable 25 Gram(s) IV Push once  glucagon  Injectable 1 milliGRAM(s) IntraMuscular once  heparin   Injectable 5000 Unit(s) SubCutaneous every 8 hours  insulin glargine Injectable (LANTUS) 20 Unit(s) SubCutaneous at bedtime  insulin lispro (ADMELOG) corrective regimen sliding scale   SubCutaneous three times a day before meals  insulin lispro (ADMELOG) corrective regimen sliding scale   SubCutaneous at bedtime  insulin lispro Injectable (ADMELOG) 10 Unit(s) SubCutaneous three times a day before meals  levothyroxine Injectable 25 MICROGram(s) IV Push at bedtime  metoprolol tartrate 12.5 milliGRAM(s) Oral every 12 hours  triamcinolone 0.1% Ointment 1 Application(s) Topical every 12 hours    MEDICATIONS  (PRN):      Allergies    azithromycin (Hives; Pruritus)    Intolerances        SOCIAL HISTORY: denies drug use    FAMILY HISTORY:  Family history of hypertension (Sibling)  sister    Family history of diabetes mellitus (Sibling)  brothers/sisters        Vital Signs Last 24 Hrs  T(C): 36.4 (2021 16:42), Max: 36.6 (2021 19:26)  T(F): 97.5 (2021 16:42), Max: 97.9 (2021 19:26)  HR: 106 (2021 16:42) (77 - 106)  BP: 127/67 (2021 16:42) (107/70 - 174/77)  BP(mean): 87 (2021 23:20) (87 - 88)  RR: 22 (2021 16:42) (18 - 40)  SpO2: 100% (2021 16:42) (95% - 100%)    PHYSICAL EXAM:     The patient was alert and oriented X 3, with significantly increased WOB on NC  OP showed no ulcerations  There was no visible lymphadenopathy.  Conjunctiva were non injected  There was no clubbing or edema of extremities.  The scalp, hair, face, eyebrows, lips, OP, neck, chest, back,   extremities X 4, nails were examined.  There was no hyperhidrosis or bromhidrosis.    Of note on skin exam:   Eczematous patches with overlying linear excoriations on hands, arms, legs, chest and back  No burrows or mites appreciated     LABS:                        10.3   10.37 )-----------( 330      ( 2021 05:14 )             34.3     01-13    143  |  109<H>  |  55<H>  ----------------------------<  83  3.6   |  20<L>  |  2.07<H>    Ca    9.0      2021 05:14  Phos  4.8       Mg     2.5         TPro  8.0  /  Alb  4.0  /  TBili  0.5  /  DBili  x   /  AST  31  /  ALT  21  /  AlkPhos  103      PT/INR - ( 2021 17:42 )   PT: 13.7 sec;   INR: 1.15 ratio         PTT - ( 2021 17:42 )  PTT:52.2 sec  Urinalysis Basic - ( 2021 19:28 )    Color: Light Yellow / Appearance: Clear / S.011 / pH: x  Gluc: x / Ketone: Negative  / Bili: Negative / Urobili: Negative   Blood: x / Protein: 30 mg/dL / Nitrite: Negative   Leuk Esterase: Moderate / RBC: 1 /hpf / WBC 5 /HPF   Sq Epi: x / Non Sq Epi: 1 /hpf / Bacteria: Negative        RADIOLOGY & ADDITIONAL STUDIES:    no additional relevant studies

## 2021-01-13 NOTE — CONSULT NOTE ADULT - SUBJECTIVE AND OBJECTIVE BOX
[facility] [place] [facility]   MD Dion Dasilva MD, D.O. Ruoru Wong, PA    From 7 AM - 5 PM:  OFFICE: [phone]  Dr. Muhammad cell: [phone]  Dr. Montero Cell: [phone]  Dr. Soria cell: [phone]  RASHIDA Smith cell: [phone]    From 5 PM - 7 AM: Answering Service: [phone]  Date of service 01-13-21 @ 12:12    -- INITIAL RENAL CONSULT NOTE  --------------------------------------------------------------------------------  HPI:  75yF Greenlandic speaking h/o HTN, HLD, DM, CAD s/p CABG 2014 and prior PCI, severe mitral regurgitation (s/p mitral clip 2019), pulmonary HTN, HF (EF 21 % June 2019), CKD IV not on dialysis (b/l SCr 2-2.2), hypothyroidism BIBEMS 2/2 worsening work of breathing.   Per patient and daughter, patient has been experiencing increased work of breathing over the past few weeks, which has been getting progressively worse, being the worst yesterday causing family to call EMS. Patient states she "feels tired from breathing" and small activities such as eating or walking around her house make her very weak.     PAST HISTORY  --------------------------------------------------------------------------------  PAST MEDICAL & SURGICAL HISTORY:  H/O pulmonary hypertension    Heart failure    CAD (coronary artery disease)  s/p CABG and PCI    Hypothyroidism    Hyperlipidemia    Diabetes mellitus, type 2    S/P mitral valve clip implantation    S/P CABG (coronary artery bypass graft)      FAMILY HISTORY:  Family history of hypertension (Sibling)  sister    Family history of diabetes mellitus (Sibling)  brothers/sisters      PAST SOCIAL HISTORY:    ALLERGIES & MEDICATIONS  --------------------------------------------------------------------------------  Allergies    azithromycin (Hives; Pruritus)    Intolerances      Standing Inpatient Medications  aspirin enteric coated 81 milliGRAM(s) Oral daily  atorvastatin 80 milliGRAM(s) Oral at bedtime  buMETAnide IVPB 4 milliGRAM(s) IV Intermittent every 12 hours  calamine/zinc oxide Lotion 1 Application(s) Topical three times a day  clopidogrel Tablet 75 milliGRAM(s) Oral daily  dextrose 40% Gel 15 Gram(s) Oral once  dextrose 5%. 1000 milliLiter(s) IV Continuous <Continuous>  dextrose 5%. 1000 milliLiter(s) IV Continuous <Continuous>  dextrose 50% Injectable 25 Gram(s) IV Push once  dextrose 50% Injectable 12.5 Gram(s) IV Push once  dextrose 50% Injectable 25 Gram(s) IV Push once  glucagon  Injectable 1 milliGRAM(s) IntraMuscular once  heparin   Injectable 5000 Unit(s) SubCutaneous every 8 hours  insulin lispro (ADMELOG) corrective regimen sliding scale   SubCutaneous three times a day before meals  insulin lispro (ADMELOG) corrective regimen sliding scale   SubCutaneous at bedtime  insulin lispro Injectable (ADMELOG) 12 Unit(s) SubCutaneous three times a day before meals  levothyroxine Injectable 25 MICROGram(s) IV Push at bedtime  metoprolol tartrate 12.5 milliGRAM(s) Oral every 12 hours  triamcinolone 0.1% Ointment 1 Application(s) Topical every 12 hours    PRN Inpatient Medications      REVIEW OF SYSTEMS  --------------------------------------------------------------------------------  Gen: No fevers/chills  Skin: No rashes  Head/Eyes/Ears: Normal hearing,  Normal vision   Respiratory: No dyspnea, cough  CV: No chest pain  GI: No abdominal pain, diarrhea, constipation, nausea, vomiting  : No dysuria, hematuria  MSK: No  edema  Heme: No easy bruising or bleeding  Psych: No significant depression    All other systems were reviewed and are negative, except as noted.    VITALS/PHYSICAL EXAM  --------------------------------------------------------------------------------  T(C): 36.5 (01-13-21 @ 08:07), Max: 37.4 (01-12-21 @ 17:38)  HR: 84 (01-13-21 @ 08:07) (77 - 104)  BP: 115/71 (01-13-21 @ 08:07) (107/70 - 174/77)  RR: 19 (01-13-21 @ 08:07) (19 - 40)  SpO2: 100% (01-13-21 @ 08:07) (95% - 100%)  Wt(kg): --  Height (cm): 149.9 (01-13-21 @ 01:15)  Weight (kg): 49.9 (01-13-21 @ 01:15)  BMI (kg/m2): 22.2 (01-13-21 @ 01:15)  BSA (m2): 1.43 (01-13-21 @ 01:15)      Physical Exam:  	Gen: NAD  	HEENT: MMM  	Pulm: CTA B/L  	CV: S1S2  	Abd: Soft, +BS   	Ext: No LE edema B/L  	Neuro: Awake  	Skin: Warm and dry      LABS/STUDIES  --------------------------------------------------------------------------------              10.3   10.37 >-----------<  330      [01-13-21 @ 05:14]              34.3     143  |  109  |  55  ----------------------------<  83      [01-13-21 @ 05:14]  3.6   |  20  |  2.07        Ca     9.0     [01-13-21 @ 05:14]      Mg     2.5     [01-13-21 @ 05:14]      Phos  4.8     [01-13-21 @ 05:14]    TPro  8.0  /  Alb  4.0  /  TBili  0.5  /  DBili  x   /  AST  31  /  ALT  21  /  AlkPhos  103  [01-13-21 @ 05:14]    PT/INR: PT 13.7 , INR 1.15       [01-12-21 @ 17:42]  PTT: 52.2       [01-12-21 @ 17:42]      Creatinine Trend:  SCr 2.07 [01-13 @ 05:14]  SCr 2.15 [01-12 @ 17:42]    Urinalysis - [01-12-21 @ 19:28]      Color Light Yellow / Appearance Clear / SG 1.011 / pH 6.0      Gluc Negative / Ketone Negative  / Bili Negative / Urobili Negative       Blood Negative / Protein 30 mg/dL / Leuk Est Moderate / Nitrite Negative      RBC 1 / WBC 5 / Hyaline 4 / Gran  / Sq Epi  / Non Sq Epi 1 / Bacteria Negative      Ferritin 111      [01-13-21 @ 01:42]  HbA1c 7.5      [10-08-19 @ 14:30]  TSH 2.15      [01-13-21 @ 10:07]    HBsAb Nonreact      [10-14-19 @ 09:34]  HBsAg Nonreact      [10-14-19 @ 09:34]  HCV 0.13, Nonreact      [10-14-19 @ 09:34]  HIV Nonreact      [10-14-19 @ 09:23]    DEAN: titer 1:320, pattern Homogeneous      [10-14-19 @ 09:23]  C3 Complement 111      [10-14-19 @ 09:35]  C4 Complement 39      [10-14-19 @ 09:35]  Rheumatoid Factor <10      [10-14-19 @ 09:30]  ANCA: cANCA Negative, pANCA Negative, atypical ANCA Indeterminate      [10-16-19 @ 10:46]  Syphilis Screen (Treponema Pallidum Ab) Positive      [10-14-19 @ 09:23]  Syphilis Screen (RPR Titer) <1:1      [10-14-19 @ 09:23]  Immunofixation Serum:   Weak IgG Lambda Band Identified    Reference Range: None Detected      [10-15-19 @ 14:35]  SPEP Interpretation: Weak Gamma-Migrating Paraprotein Identified      [10-15-19 @ 14:35]  Immunofixation Urine: Reference Range: None Detected      [10-15-19 @ 15:36]  UPEP Interpretation: Mild Selective (Glomerular) Proteinuria      [10-15-19 @ 15:36]  Cryoglobulin: Negative      [10-15-19 @ 14:51]

## 2021-01-13 NOTE — PROGRESS NOTE ADULT - PROBLEM SELECTOR PLAN 4
TTE June 2019 shows severe pulm HTN    Plan:   - Order TTE  - cards consult Most likely due to LV failure   -C/w Bumex 4 mg BID   -F/u with repeat TTE

## 2021-01-13 NOTE — PROGRESS NOTE ADULT - PROBLEM SELECTOR PLAN 7
On Atorvastatin 80mg     Plan:  - continue Atorvastatin 80 mg when patient no longer NPO - continue Atorvastatin 80 mg when patient no longer NPO

## 2021-01-14 LAB
A1C WITH ESTIMATED AVERAGE GLUCOSE RESULT: 7.2 % — HIGH (ref 4–5.6)
ALBUMIN SERPL ELPH-MCNC: 3.6 G/DL — SIGNIFICANT CHANGE UP (ref 3.3–5)
ALP SERPL-CCNC: 108 U/L — SIGNIFICANT CHANGE UP (ref 40–120)
ALT FLD-CCNC: 27 U/L — SIGNIFICANT CHANGE UP (ref 10–45)
ANION GAP SERPL CALC-SCNC: 16 MMOL/L — SIGNIFICANT CHANGE UP (ref 5–17)
AST SERPL-CCNC: 35 U/L — SIGNIFICANT CHANGE UP (ref 10–40)
BILIRUB SERPL-MCNC: 0.6 MG/DL — SIGNIFICANT CHANGE UP (ref 0.2–1.2)
BUN SERPL-MCNC: 57 MG/DL — HIGH (ref 7–23)
CALCIUM SERPL-MCNC: 9.6 MG/DL — SIGNIFICANT CHANGE UP (ref 8.4–10.5)
CHLORIDE SERPL-SCNC: 107 MMOL/L — SIGNIFICANT CHANGE UP (ref 96–108)
CO2 SERPL-SCNC: 18 MMOL/L — LOW (ref 22–31)
CREAT SERPL-MCNC: 1.94 MG/DL — HIGH (ref 0.5–1.3)
ESTIMATED AVERAGE GLUCOSE: 160 MG/DL — HIGH (ref 68–114)
GAS PNL BLDV: SIGNIFICANT CHANGE UP
GLUCOSE BLDC GLUCOMTR-MCNC: 187 MG/DL — HIGH (ref 70–99)
GLUCOSE BLDC GLUCOMTR-MCNC: 243 MG/DL — HIGH (ref 70–99)
GLUCOSE BLDC GLUCOMTR-MCNC: 278 MG/DL — HIGH (ref 70–99)
GLUCOSE BLDC GLUCOMTR-MCNC: 384 MG/DL — HIGH (ref 70–99)
GLUCOSE SERPL-MCNC: 140 MG/DL — HIGH (ref 70–99)
HCT VFR BLD CALC: 32.9 % — LOW (ref 34.5–45)
HGB BLD-MCNC: 9.8 G/DL — LOW (ref 11.5–15.5)
LEGIONELLA AG UR QL: NEGATIVE — SIGNIFICANT CHANGE UP
MAGNESIUM SERPL-MCNC: 2.4 MG/DL — SIGNIFICANT CHANGE UP (ref 1.6–2.6)
MCHC RBC-ENTMCNC: 26.8 PG — LOW (ref 27–34)
MCHC RBC-ENTMCNC: 29.8 GM/DL — LOW (ref 32–36)
MCV RBC AUTO: 90.1 FL — SIGNIFICANT CHANGE UP (ref 80–100)
NRBC # BLD: 0 /100 WBCS — SIGNIFICANT CHANGE UP (ref 0–0)
PHOSPHATE SERPL-MCNC: 4.2 MG/DL — SIGNIFICANT CHANGE UP (ref 2.5–4.5)
PLATELET # BLD AUTO: 327 K/UL — SIGNIFICANT CHANGE UP (ref 150–400)
POTASSIUM SERPL-MCNC: 3.9 MMOL/L — SIGNIFICANT CHANGE UP (ref 3.5–5.3)
POTASSIUM SERPL-SCNC: 3.9 MMOL/L — SIGNIFICANT CHANGE UP (ref 3.5–5.3)
PROT SERPL-MCNC: 7.7 G/DL — SIGNIFICANT CHANGE UP (ref 6–8.3)
RBC # BLD: 3.65 M/UL — LOW (ref 3.8–5.2)
RBC # FLD: 17.9 % — HIGH (ref 10.3–14.5)
SODIUM SERPL-SCNC: 141 MMOL/L — SIGNIFICANT CHANGE UP (ref 135–145)
WBC # BLD: 10.45 K/UL — SIGNIFICANT CHANGE UP (ref 3.8–10.5)
WBC # FLD AUTO: 10.45 K/UL — SIGNIFICANT CHANGE UP (ref 3.8–10.5)

## 2021-01-14 PROCEDURE — 99233 SBSQ HOSP IP/OBS HIGH 50: CPT | Mod: GC

## 2021-01-14 RX ORDER — PERMETHRIN CREAM 5% W/W 50 MG/G
1 CREAM TOPICAL ONCE
Refills: 0 | Status: COMPLETED | OUTPATIENT
Start: 2021-01-14 | End: 2021-01-14

## 2021-01-14 RX ORDER — ACETAMINOPHEN 500 MG
975 TABLET ORAL ONCE
Refills: 0 | Status: COMPLETED | OUTPATIENT
Start: 2021-01-14 | End: 2021-01-14

## 2021-01-14 RX ORDER — BUMETANIDE 0.25 MG/ML
2 INJECTION INTRAMUSCULAR; INTRAVENOUS ONCE
Refills: 0 | Status: COMPLETED | OUTPATIENT
Start: 2021-01-14 | End: 2021-01-14

## 2021-01-14 RX ORDER — POTASSIUM CHLORIDE 20 MEQ
40 PACKET (EA) ORAL ONCE
Refills: 0 | Status: COMPLETED | OUTPATIENT
Start: 2021-01-14 | End: 2021-01-14

## 2021-01-14 RX ADMIN — Medication 975 MILLIGRAM(S): at 06:52

## 2021-01-14 RX ADMIN — Medication 1 APPLICATION(S): at 05:25

## 2021-01-14 RX ADMIN — HEPARIN SODIUM 5000 UNIT(S): 5000 INJECTION INTRAVENOUS; SUBCUTANEOUS at 12:20

## 2021-01-14 RX ADMIN — ATORVASTATIN CALCIUM 80 MILLIGRAM(S): 80 TABLET, FILM COATED ORAL at 21:44

## 2021-01-14 RX ADMIN — BUMETANIDE 132 MILLIGRAM(S): 0.25 INJECTION INTRAMUSCULAR; INTRAVENOUS at 17:41

## 2021-01-14 RX ADMIN — BUMETANIDE 2 MILLIGRAM(S): 0.25 INJECTION INTRAMUSCULAR; INTRAVENOUS at 06:05

## 2021-01-14 RX ADMIN — BUMETANIDE 132 MILLIGRAM(S): 0.25 INJECTION INTRAMUSCULAR; INTRAVENOUS at 05:26

## 2021-01-14 RX ADMIN — Medication 2: at 11:58

## 2021-01-14 RX ADMIN — Medication 40 MILLIEQUIVALENT(S): at 17:41

## 2021-01-14 RX ADMIN — Medication 3 MILLIGRAM(S): at 21:44

## 2021-01-14 RX ADMIN — Medication 10 UNIT(S): at 07:48

## 2021-01-14 RX ADMIN — Medication 1 APPLICATION(S): at 16:34

## 2021-01-14 RX ADMIN — CALAMINE AND ZINC OXIDE AND PHENOL 1 APPLICATION(S): 160; 10 LOTION TOPICAL at 05:25

## 2021-01-14 RX ADMIN — Medication 81 MILLIGRAM(S): at 12:20

## 2021-01-14 RX ADMIN — Medication 12.5 MILLIGRAM(S): at 05:25

## 2021-01-14 RX ADMIN — Medication 3: at 21:55

## 2021-01-14 RX ADMIN — CLOPIDOGREL BISULFATE 75 MILLIGRAM(S): 75 TABLET, FILM COATED ORAL at 12:20

## 2021-01-14 RX ADMIN — Medication 10 UNIT(S): at 16:35

## 2021-01-14 RX ADMIN — Medication 25 MICROGRAM(S): at 21:44

## 2021-01-14 RX ADMIN — CALAMINE AND ZINC OXIDE AND PHENOL 1 APPLICATION(S): 160; 10 LOTION TOPICAL at 21:45

## 2021-01-14 RX ADMIN — Medication 1: at 07:48

## 2021-01-14 RX ADMIN — Medication 10 UNIT(S): at 11:58

## 2021-01-14 RX ADMIN — Medication 12.5 MILLIGRAM(S): at 17:41

## 2021-01-14 RX ADMIN — PERMETHRIN CREAM 5% W/W 1 APPLICATION(S): 50 CREAM TOPICAL at 16:33

## 2021-01-14 RX ADMIN — CALAMINE AND ZINC OXIDE AND PHENOL 1 APPLICATION(S): 160; 10 LOTION TOPICAL at 12:20

## 2021-01-14 RX ADMIN — INSULIN GLARGINE 20 UNIT(S): 100 INJECTION, SOLUTION SUBCUTANEOUS at 21:51

## 2021-01-14 RX ADMIN — Medication 3: at 16:35

## 2021-01-14 RX ADMIN — HEPARIN SODIUM 5000 UNIT(S): 5000 INJECTION INTRAVENOUS; SUBCUTANEOUS at 05:25

## 2021-01-14 RX ADMIN — HEPARIN SODIUM 5000 UNIT(S): 5000 INJECTION INTRAVENOUS; SUBCUTANEOUS at 21:43

## 2021-01-14 NOTE — PROGRESS NOTE ADULT - SUBJECTIVE AND OBJECTIVE BOX
[facility] [place] [facility]   MD Dion Dasilva MD, D.O. Ruoru Wong, PA    From 7 AM - 5 PM:  OFFICE: [phone]  Dr. Muhammad cell: [phone]  Dr. Montero cell: [phone]  Dr. Soria cell: [phone]  RASHIDA Smith cell: [phone]    From 5 PM - 7 AM: Answering Service: [phone]  Date of service: 01-14-21 @ 11:11    RENAL FOLLOW UP NOTE  --------------------------------------------------------------------------------  HPI:  Pt seen and examined at bedside.       PAST HISTORY  --------------------------------------------------------------------------------  No significant changes to PMH, PSH, FHx, SHx, unless otherwise noted    ALLERGIES & MEDICATIONS  --------------------------------------------------------------------------------  Allergies    azithromycin (Hives; Pruritus)    Intolerances      Standing Inpatient Medications  aspirin enteric coated 81 milliGRAM(s) Oral daily  atorvastatin 80 milliGRAM(s) Oral at bedtime  buMETAnide IVPB 4 milliGRAM(s) IV Intermittent every 12 hours  calamine/zinc oxide Lotion 1 Application(s) Topical three times a day  clopidogrel Tablet 75 milliGRAM(s) Oral daily  dextrose 40% Gel 15 Gram(s) Oral once  dextrose 5%. 1000 milliLiter(s) IV Continuous <Continuous>  dextrose 5%. 1000 milliLiter(s) IV Continuous <Continuous>  dextrose 50% Injectable 25 Gram(s) IV Push once  dextrose 50% Injectable 12.5 Gram(s) IV Push once  dextrose 50% Injectable 25 Gram(s) IV Push once  glucagon  Injectable 1 milliGRAM(s) IntraMuscular once  heparin   Injectable 5000 Unit(s) SubCutaneous every 8 hours  insulin glargine Injectable (LANTUS) 20 Unit(s) SubCutaneous at bedtime  insulin lispro (ADMELOG) corrective regimen sliding scale   SubCutaneous three times a day before meals  insulin lispro (ADMELOG) corrective regimen sliding scale   SubCutaneous at bedtime  insulin lispro Injectable (ADMELOG) 10 Unit(s) SubCutaneous three times a day before meals  levothyroxine Injectable 25 MICROGram(s) IV Push at bedtime  melatonin 3 milliGRAM(s) Oral at bedtime  metoprolol tartrate 12.5 milliGRAM(s) Oral every 12 hours  triamcinolone 0.1% Ointment 1 Application(s) Topical every 12 hours    PRN Inpatient Medications      REVIEW OF SYSTEMS  --------------------------------------------------------------------------------  General: no fever  CVS: no chest pain  RESP: no sob, no cough  ABD: no abdominal pain  : no dysuria,  MSK: no edema     VITALS/PHYSICAL EXAM  --------------------------------------------------------------------------------  T(C): 36.9 (01-14-21 @ 04:50), Max: 36.9 (01-14-21 @ 04:50)  HR: 96 (01-14-21 @ 06:09) (92 - 106)  BP: 136/80 (01-14-21 @ 04:50) (108/69 - 136/80)  RR: 18 (01-14-21 @ 04:50) (18 - 30)  SpO2: 98% (01-14-21 @ 06:09) (95% - 100%)  Wt(kg): --  Height (cm): 149.9 (01-13-21 @ 01:15)  Weight (kg): 49.9 (01-13-21 @ 01:15)  BMI (kg/m2): 22.2 (01-13-21 @ 01:15)  BSA (m2): 1.43 (01-13-21 @ 01:15)      01-13-21 @ 07:01  -  01-14-21 @ 07:00  --------------------------------------------------------  IN: 300 mL / OUT: 700 mL / NET: -400 mL      Physical Exam:  	Gen: NAD  	HEENT: MMM  	Pulm: CTA B/L  	CV: S1S2  	Abd: Soft, +BS  	Ext: No LE edema B/L                      Neuro: Awake   	Skin: Warm and Dry   	    LABS/STUDIES  --------------------------------------------------------------------------------              9.8    10.45 >-----------<  327      [01-14-21 @ 07:06]              32.9     141  |  107  |  57  ----------------------------<  140      [01-14-21 @ 07:06]  3.9   |  18  |  1.94        Ca     9.6     [01-14-21 @ 07:06]      Mg     2.4     [01-14-21 @ 07:06]      Phos  4.2     [01-14-21 @ 07:06]    TPro  7.7  /  Alb  3.6  /  TBili  0.6  /  DBili  x   /  AST  35  /  ALT  27  /  AlkPhos  108  [01-14-21 @ 07:06]    PT/INR: PT 13.7 , INR 1.15       [01-12-21 @ 17:42]  PTT: 52.2       [01-12-21 @ 17:42]      Creatinine Trend:  SCr 1.94 [01-14 @ 07:06]  SCr 2.07 [01-13 @ 05:14]  SCr 2.15 [01-12 @ 17:42]    Urinalysis - [01-12-21 @ 19:28]      Color Light Yellow / Appearance Clear / SG 1.011 / pH 6.0      Gluc Negative / Ketone Negative  / Bili Negative / Urobili Negative       Blood Negative / Protein 30 mg/dL / Leuk Est Moderate / Nitrite Negative      RBC 1 / WBC 5 / Hyaline 4 / Gran  / Sq Epi  / Non Sq Epi 1 / Bacteria Negative      Ferritin 111      [01-13-21 @ 01:42]  HbA1c 7.5      [10-08-19 @ 14:30]  TSH 2.15      [01-13-21 @ 10:07]

## 2021-01-14 NOTE — PROGRESS NOTE ADULT - PROBLEM SELECTOR PLAN 1
Patient describes increased work of breathing and tachypnea, does not appear to be hypercarbic or hypoxic. However, CXR shows Bilateral predominantly lower lobe hazy opacities with elevated pro-BNP 5178  -Most likely due to worsening HF   -Started bumex 4 mg BID   - C/w metoprolol 12.5 mg BID. No ACE-i or ARBs at home    - F/u with TTE  - Cardiology consulted for GDMT  - Low suspicion for PNA. No clinical signs of PNA.   - F/u with blood culture, urine culture and sputum cx.   - Monitor off abx for now   - CT chest no contrast Patient describes increased work of breathing and tachypnea, does not appear to be hypercarbic or hypoxic. However, CXR shows Bilateral predominantly lower lobe hazy opacities with elevated pro-BNP 5178  -Most likely due to worsening HF   -Started bumex 4 mg BID   - C/w metoprolol 12.5 mg BID. No ACE-i or ARBs at home    - F/u with TTE  - Cardiology consulted for GDMT  - Low suspicion for PNA. No clinical signs of PNA.   - Blood cx and urine cx are negative   - Monitor off abx for now   - CT chest showed no focal consolidation

## 2021-01-14 NOTE — PROGRESS NOTE ADULT - ASSESSMENT
75y.o F Polish speaking h/o HTN, HLD, DM, CAD s/p CABG 2014 and prior PCI, severe mitral regurgitation (s/p mitral clip 2019), pulmonary HTN (TTE 2019), HF (EF 21 % June 2019), CKD IV not on dialysis (b/l SCr 2-2.2), hypothyroidism BIBEMS 2/2 worsening work of breathing, found to have crackles, elevated proBNP and b/l pulmonary edema on CXR c/w acute exacerbation of CHF.

## 2021-01-14 NOTE — PROGRESS NOTE ADULT - ASSESSMENT
76 yo F w/ PMH of CKD stage 4 (baseline Cr 1.9-2.2) HTN, T2DM, CAD s/p CABG X3 (2014 at [facility]), non-dilated ICM (EF 20-25%), severe mitral regurgitation s/p mitral clip (6/13/19), severe pulm HTN, hypothyroidism who presented for evaluation of SOB and was admitted for ADHF.    CKD stage 4  - baseline Cr 1.9-2.2; has had recurrent MERVIN's over the years  - CKD is likely 2/2 recurrent MERVIN's + underlying DM/HTN  - Scr currently stable   - Agree w. IV diuretics   - renal sonogram in the past with simple renal cyst  - Avoid nephrotoxins including NSAIDs, IV contrast (if possible), [employer] products  - monitor I/O's accurately    HTN  BP controlled    Anemia  Hb acceptable     Proteinuria/Hematuria  - likely from underlying DM  - has 1.8 grams proteinuria  - Hep B, Hep C, HIV RF, C3, C4, p-ANCA, c-ANCA, RPR neg  - weak gamma-migrating protein, weak IgG lambda protein band noted in the past. Pt to follow w/ heme   - DEAN 1:320 positive  - RPR neg, FTA +

## 2021-01-14 NOTE — PROGRESS NOTE ADULT - PROBLEM SELECTOR PLAN 3
History & Physical  Gynecology      SUBJECTIVE:     Chief Complaint: Well Woman and Vaginal Discharge       History of Present Illness:  Annual Exam-Premenopausal  Patient presents for annual exam. The patient reports that she gets repetitive yeast infections. She has tried most things but is now trying natural supplements which are not healing.  The patient is sexually active. GYN screening history: last pap: was normal. The patient wears seatbelts: yes. The patient participates in regular exercise: yes. Has the patient ever been transfused or tattooed?: yes. The patient reports that there is not domestic violence in her life.    She denies personal and familial history of breast/uterine/ovarian/cervical/colon cancer.      Review of patient's allergies indicates:  No Known Allergies    History reviewed. No pertinent past medical history.  History reviewed. No pertinent surgical history.  OB History        0    Para   0    Term   0       0    AB   0    Living   0       SAB   0    TAB   0    Ectopic   0    Multiple   0    Live Births   0               History reviewed. No pertinent family history.  Social History     Tobacco Use    Smoking status: Never Smoker    Smokeless tobacco: Never Used   Substance Use Topics    Alcohol use: Yes    Drug use: Never       Current Outpatient Medications   Medication Sig    acetaminophen-codeine 300-30mg (TYLENOL #3) 300-30 mg Tab TK 1 T PO  Q 6 H PRN P    terconazole (TERAZOL 3) 0.8 % vaginal cream Place 1 applicator vaginally every evening. for 3 days     No current facility-administered medications for this visit.      Review of Systems:  Review of Systems   Constitutional: Negative for chills and fever.   Eyes: Negative for visual disturbance.   Respiratory: Negative for cough and wheezing.    Cardiovascular: Negative for chest pain and palpitations.   Gastrointestinal: Negative for abdominal pain, nausea and vomiting.   Genitourinary: Positive for vaginal  Raised urticarial rash over UE and back   -Derm consulted   -C/w topical steroids for now discharge. Negative for dysuria, frequency, hematuria, pelvic pain, vaginal bleeding and vaginal pain.   Neurological: Negative for headaches.   Psychiatric/Behavioral: Negative for depression.        OBJECTIVE:     Physical Exam:  Physical Exam  Vitals signs and nursing note reviewed. Exam conducted with a chaperone present.   Constitutional:       Appearance: She is well-developed.   Cardiovascular:      Rate and Rhythm: Normal rate.   Pulmonary:      Effort: Pulmonary effort is normal. No respiratory distress.   Chest:      Breasts: Breasts are symmetrical.     Abdominal:      General: There is no distension.      Palpations: Abdomen is soft.      Tenderness: There is no abdominal tenderness.   Genitourinary:     Vagina: Vaginal discharge present.      Comments: Uterus mobile and nontender  Skin:     General: Skin is warm and dry.   Neurological:      Mental Status: She is alert and oriented to person, place, and time.       ASSESSMENT:       ICD-10-CM ICD-9-CM    1. Well woman exam with routine gynecological exam  Z01.419 V72.31 Liquid-Based Pap Smear, Screening   2. Screening examination for STD (sexually transmitted disease)  Z11.3 V74.5 Vaginosis Screen by DNA Probe      C. trachomatis/N. gonorrhoeae by AMP DNA      Hepatitis Panel, Acute      RPR      HIV 1/2 Ag/Ab (4th Gen)   3. Vaginal discharge  N89.8 623.5 terconazole (TERAZOL 3) 0.8 % vaginal cream          Plan:      Barbi was seen today for well woman and vaginal discharge.    Diagnoses and all orders for this visit:    Well woman exam with routine gynecological exam  -     Liquid-Based Pap Smear, Screening  - Nexplanon for birth control    Screening examination for STD (sexually transmitted disease)  -     Vaginosis Screen by DNA Probe  -     C. trachomatis/N. gonorrhoeae by AMP DNA  -     Hepatitis Panel, Acute; Future  -     RPR; Future  -     HIV 1/2 Ag/Ab (4th Gen); Future    Vaginal discharge  -     terconazole (TERAZOL 3) 0.8 % vaginal cream;  Place 1 applicator vaginally every evening. for 3 days  - Discussed with patient how douching/body wash/scented soaps/bubble baths can shift her vaginal maxine and natural vaginal pH which can cause overgrowth of yeast or gardnerella which is the bacteria that causes BV. Discussed with patient to use only mild, unscented soaps such as Dove unscented and Dial and water to wash her vagina.       Orders Placed This Encounter   Procedures    Vaginosis Screen by DNA Probe    C. trachomatis/N. gonorrhoeae by AMP DNA    Hepatitis Panel, Acute    RPR    HIV 1/2 Ag/Ab (4th Gen)       Follow up in about 1 year (around 9/4/2021) for Well Woman/Annual.     Counseling time: 30 minutes    Manny Wheeler

## 2021-01-14 NOTE — PROGRESS NOTE ADULT - ATTENDING COMMENTS
75F w/ PMHx of severe ICM (EF 20%), severe MR s/p mitraclip, HTN, HLD, DM2, CKD b/l CR~2 p/w sob. Suspect acute on chronic chf exacerbation. PNA less likely, can dc abx. She has been poorly tolerant of ACEi/ARB and MRA in the past due to hypotension. Will inc bumex to 4 bid and monitor UOP. Goal 1-2 L negative. Strict is/os and daily, consider cuellar.  Respiratory status is improving.    #acute on chronic HFrEF  -ECG NSR, pvcs, RA deviation, diffuse TWIs and ST deppressions in precordial leads  -suspect trop is demand in setting of CHF excacerbation  -she is warm at this time - c/w home metoprolol ?uptitrate as HR is above goal  -bumex 4 IV bid  -tele  -repeat TTE  -cards consult  -trend LFTs daily  -strict is/os and daily standing weights - consider cuellar for accurate outs    #DM2  -she is hypoglycemic and has not received lantus  -will reduce lantus dose given poor PO and uptitrate as needed    #rash  -diffuse erythematous, raised pruritic papules ?papular urticaria ?severe eczema  -start steroid cream, hydroxycine prn, permetherin per derm    Tor Newberry MD  Division of Hospital Medicine  Pager: [phone]  Office: [phone]

## 2021-01-14 NOTE — PROGRESS NOTE ADULT - SUBJECTIVE AND OBJECTIVE BOX
S: Seen this AM, reported less SOB, was on 4L NC. Denies chest pain. Review of systems otherwise (-)    Review of Systems:   Constitutional: [ ] fevers, [ ] chills.   Skin: [ ] dry skin. [ ] rashes.  Psychiatric: [ ] depression, [ ] anxiety.   Gastrointestinal: [ ] BRBPR, [ ] melena.   Neurological: [ ] confusion. [ ] seizures. [ ] shuffling gait.   Ears,Nose,Mouth and Throat: [ ] ear pain [ ] sore throat.   Eyes: [ ] diplopia.   Respiratory: [ ] hemoptysis. [ ] shortness of breath  Cardiovascular: See HPI above  Hematologic/Lymphatic: [ ] anemia. [ ] painful nodes. [ ] prolonged bleeding.   Genitourinary: [ ] hematuria. [ ] flank pain.   Endocrine: [ ] significant change in weight. [ ] intolerance to heat and cold.     Review of systems [x ] otherwise negative, [ ] otherwise unable to obtain    FH: no family history of sudden cardiac death in first degree relatives    SH: [ ] tobacco, [ ] alcohol, [ ] drugs    aspirin enteric coated 81 milliGRAM(s) Oral daily  atorvastatin 80 milliGRAM(s) Oral at bedtime  buMETAnide IVPB 4 milliGRAM(s) IV Intermittent every 12 hours  calamine/zinc oxide Lotion 1 Application(s) Topical three times a day  clobetasol 0.05% Cream 1 Application(s) Topical every 12 hours  clopidogrel Tablet 75 milliGRAM(s) Oral daily  dextrose 40% Gel 15 Gram(s) Oral once  dextrose 5%. 1000 milliLiter(s) IV Continuous <Continuous>  dextrose 5%. 1000 milliLiter(s) IV Continuous <Continuous>  dextrose 50% Injectable 25 Gram(s) IV Push once  dextrose 50% Injectable 12.5 Gram(s) IV Push once  dextrose 50% Injectable 25 Gram(s) IV Push once  glucagon  Injectable 1 milliGRAM(s) IntraMuscular once  heparin   Injectable 5000 Unit(s) SubCutaneous every 8 hours  insulin glargine Injectable (LANTUS) 20 Unit(s) SubCutaneous at bedtime  insulin lispro (ADMELOG) corrective regimen sliding scale   SubCutaneous three times a day before meals  insulin lispro (ADMELOG) corrective regimen sliding scale   SubCutaneous at bedtime  insulin lispro Injectable (ADMELOG) 10 Unit(s) SubCutaneous three times a day before meals  levothyroxine Injectable 25 MICROGram(s) IV Push at bedtime  melatonin 3 milliGRAM(s) Oral at bedtime  metoprolol tartrate 12.5 milliGRAM(s) Oral every 12 hours  permethrin 5% Cream 1 Application(s) Topical once                            9.8    10.45 )-----------( 327      ( 14 Jan 2021 07:06 )             32.9       01-14    141  |  107  |  57<H>  ----------------------------<  140<H>  3.9   |  18<L>  |  1.94<H>    Ca    9.6      14 Jan 2021 07:06  Phos  4.2     01-14  Mg     2.4     01-14    TPro  7.7  /  Alb  3.6  /  TBili  0.6  /  DBili  x   /  AST  35  /  ALT  27  /  AlkPhos  108  01-14            T(C): 36.9 (01-14-21 @ 11:30), Max: 36.9 (01-14-21 @ 04:50)  HR: 87 (01-14-21 @ 11:30) (86 - 106)  BP: 111/67 (01-14-21 @ 11:30) (108/69 - 136/80)  RR: 18 (01-14-21 @ 11:30) (18 - 22)  SpO2: 97% (01-14-21 @ 11:30) (97% - 100%)  Wt(kg): --    I&O's Summary    13 Jan 2021 07:01  -  14 Jan 2021 07:00  --------------------------------------------------------  IN: 300 mL / OUT: 700 mL / NET: -400 mL    14 Jan 2021 07:01  -  14 Jan 2021 16:13  --------------------------------------------------------  IN: 590 mL / OUT: 400 mL / NET: 190 mL      General: Well nourished in no acute distress. Alert and Oriented * 3.   Head: Normocephalic and atraumatic.   Neck: No JVD. No bruits. Supple. Does not appear to be enlarged.   Cardiovascular: + S1,S2 ; RRR Soft systolic murmur at the left lower sternal border. No rubs noted.    Lungs: Decrease BS at bases b/l  Abdomen: + BS, soft. Non tender. Non distended. No rebound. No guarding.   Extremities: No clubbing/cyanosis/edema.   Neurologic: Moves all four extremities. Full range of motion.   Skin: Warm and moist. The patient's skin has normal elasticity and good skin turgor.   Psychiatric: Appropriate mood and affect.  Musculoskeletal: Normal range of motion, normal strength    TELEMETRY: NSR 70-90  ECG:  NSR,. iRBBB.  Echo: 2019 21% EF, severe PHTN, normal LA size, s/p MitraClip  NST: 2018. Prior LCx territory MI.      < from: TTE with Doppler (w/Cont) (01.13.21 @ 08:31) >  Conclusions:  1. Tethered mitral leaflets. Patient status-post mitral  clip placement. Mild-moderate mitral regurgitation. Mean  transmitral valve gradient equals 7-8 mm Hg. (HRabout 80s  bpm)  2. Aortic valve not well visualized; appears to be a  calcified trileaflet valve with mildly decreased opening.  Minimal aortic regurgitation.  3. Endocardial visualization enhanced with intravenous  injection of Ultrasonic Enhancing Agent (Definity). Severe  global left ventricular systolic dysfunction with regional  variation. No left ventricular thrombus. Septal motion  consistent with prior cardiac surgery.  4. Normal right ventricular size with decreased right  ventricular systolic function.  5. Estimated pulmonary artery systolic pressure equals 49  mm Hg, assuming right atrial pressure equals 8 mm Hg,  consistent with mild pulmonary pressures.  *** Compared with echocardiogram of 10/13/2019, results are  similar on today's study. Mean transmitral gradient is  increased on today's study in setting of faster heart rate.    < end of copied text >    ASSESSMENT/PLAN: 75y Female German speaking h/o HTN, HLD, DM, CAD s/p CABG 2014 and prior PCI, severe mitral regurgitation (s/p mitral clip 2019), pulmonary HTN, HF (EF 21 % June 2019), CKD IV not on dialysis (b/l SCr 2-2.2), hypothyroidism BIBEMS 2/2 worsening work of breathing presents in acute on chronic systolic CHF with NYHA IV functional status.    - Continue BID IV high-dose Bumex  - Replace K to 4-4.5 (vigorously, in anticipation of diuresis), and keep Mg 2.  - Prior to this hospitalization, she was taking 1mg Bumex AM and 0.5mg two more times during the day.  - I have a low threshold to request starting inotropes for management of her heart failure, given lack of ability to tolerate GDMT and frequent admissions.    - Would maintain low dose metoprolol at current dose while diuresing  - Repeat TTE noted above with EF 20%  - May consider RHC at some point on this admission, once closer to euvolemic.  - Further recs pending above    Amauri Hill PA-C  Pager: [phone]

## 2021-01-14 NOTE — PROGRESS NOTE ADULT - SUBJECTIVE AND OBJECTIVE BOX
Patient is a 75y old  Female who presents with a chief complaint of increased work of breathing (2021 17:50)      SUBJECTIVE / OVERNIGHT EVENTS:          MEDICATIONS  (STANDING):  aspirin enteric coated 81 milliGRAM(s) Oral daily  atorvastatin 80 milliGRAM(s) Oral at bedtime  buMETAnide IVPB 4 milliGRAM(s) IV Intermittent every 12 hours  calamine/zinc oxide Lotion 1 Application(s) Topical three times a day  clopidogrel Tablet 75 milliGRAM(s) Oral daily  dextrose 40% Gel 15 Gram(s) Oral once  dextrose 5%. 1000 milliLiter(s) (50 mL/Hr) IV Continuous <Continuous>  dextrose 5%. 1000 milliLiter(s) (100 mL/Hr) IV Continuous <Continuous>  dextrose 50% Injectable 25 Gram(s) IV Push once  dextrose 50% Injectable 12.5 Gram(s) IV Push once  dextrose 50% Injectable 25 Gram(s) IV Push once  glucagon  Injectable 1 milliGRAM(s) IntraMuscular once  heparin   Injectable 5000 Unit(s) SubCutaneous every 8 hours  insulin glargine Injectable (LANTUS) 20 Unit(s) SubCutaneous at bedtime  insulin lispro (ADMELOG) corrective regimen sliding scale   SubCutaneous three times a day before meals  insulin lispro (ADMELOG) corrective regimen sliding scale   SubCutaneous at bedtime  insulin lispro Injectable (ADMELOG) 10 Unit(s) SubCutaneous three times a day before meals  levothyroxine Injectable 25 MICROGram(s) IV Push at bedtime  melatonin 3 milliGRAM(s) Oral at bedtime  metoprolol tartrate 12.5 milliGRAM(s) Oral every 12 hours  triamcinolone 0.1% Ointment 1 Application(s) Topical every 12 hours    MEDICATIONS  (PRN):      Vital Signs Last 24 Hrs  T(C): 36.9 (2021 04:50), Max: 36.9 (2021 04:50)  T(F): 98.4 (2021 04:50), Max: 98.4 (2021 04:50)  HR: 96 (2021 06:09) (84 - 106)  BP: 136/80 (2021 04:50) (107/70 - 136/80)  BP(mean): --  RR: 18 (2021 04:50) (18 - 30)  SpO2: 98% (2021 06:09) (95% - 100%)      PHYSICAL EXAM  GENERAL: NAD, well-developed  HEAD:  Atraumatic, Normocephalic  EYES: EOMI, PERRLA, conjunctiva and sclera clear  NECK: Supple, No JVD  CHEST/LUNG: Clear to auscultation bilaterally; No wheeze  HEART: Regular rate and rhythm; No murmurs, rubs, or gallops  ABDOMEN: Soft, Nontender, Nondistended; Bowel sounds present  EXTREMITIES:  2+ Peripheral Pulses, No clubbing, cyanosis, or edema  PSYCH: AAOx3  SKIN: No rashes or lesions    CAPILLARY BLOOD GLUCOSE      POCT Blood Glucose.: 104 mg/dL (2021 21:33)  POCT Blood Glucose.: 93 mg/dL (2021 17:09)  POCT Blood Glucose.: 92 mg/dL (2021 13:45)    I&O's Summary    2021 07:01  -  2021 06:29  --------------------------------------------------------  IN: 300 mL / OUT: 700 mL / NET: -400 mL        LABS:                        10.3   10.37 )-----------( 330      ( 2021 05:14 )             34.3     01-13    143  |  109<H>  |  55<H>  ----------------------------<  83  3.6   |  20<L>  |  2.07<H>    Ca    9.0      2021 05:14  Phos  4.8     01-13  Mg     2.5     -    TPro  8.0  /  Alb  4.0  /  TBili  0.5  /  DBili  x   /  AST  31  /  ALT  21  /  AlkPhos  103  01-13    PT/INR - ( 2021 17:42 )   PT: 13.7 sec;   INR: 1.15 ratio         PTT - ( 2021 17:42 )  PTT:52.2 sec      Urinalysis Basic - ( 2021 19:28 )    Color: Light Yellow / Appearance: Clear / S.011 / pH: x  Gluc: x / Ketone: Negative  / Bili: Negative / Urobili: Negative   Blood: x / Protein: 30 mg/dL / Nitrite: Negative   Leuk Esterase: Moderate / RBC: 1 /hpf / WBC 5 /HPF   Sq Epi: x / Non Sq Epi: 1 /hpf / Bacteria: Negative        RADIOLOGY & ADDITIONAL TESTS:     MICROBIOLOGY:    ANTIMICROBIALS:    CONSULTS: Patient is a 75y old  Female who presents with a chief complaint of increased work of breathing (2021 17:50)      SUBJECTIVE / OVERNIGHT EVENTS:    Overnight patient was tachypneic to 30s. She was given one additional dose of bumex. This morning, her SOB is a little better as per the patient. She also reports to have LE pain and itchiness. She denies any recent fever, chills, nausea, vomiting, abdominal pain, or LE edema.       MEDICATIONS  (STANDING):  aspirin enteric coated 81 milliGRAM(s) Oral daily  atorvastatin 80 milliGRAM(s) Oral at bedtime  buMETAnide IVPB 4 milliGRAM(s) IV Intermittent every 12 hours  calamine/zinc oxide Lotion 1 Application(s) Topical three times a day  clopidogrel Tablet 75 milliGRAM(s) Oral daily  dextrose 40% Gel 15 Gram(s) Oral once  dextrose 5%. 1000 milliLiter(s) (50 mL/Hr) IV Continuous <Continuous>  dextrose 5%. 1000 milliLiter(s) (100 mL/Hr) IV Continuous <Continuous>  dextrose 50% Injectable 25 Gram(s) IV Push once  dextrose 50% Injectable 12.5 Gram(s) IV Push once  dextrose 50% Injectable 25 Gram(s) IV Push once  glucagon  Injectable 1 milliGRAM(s) IntraMuscular once  heparin   Injectable 5000 Unit(s) SubCutaneous every 8 hours  insulin glargine Injectable (LANTUS) 20 Unit(s) SubCutaneous at bedtime  insulin lispro (ADMELOG) corrective regimen sliding scale   SubCutaneous three times a day before meals  insulin lispro (ADMELOG) corrective regimen sliding scale   SubCutaneous at bedtime  insulin lispro Injectable (ADMELOG) 10 Unit(s) SubCutaneous three times a day before meals  levothyroxine Injectable 25 MICROGram(s) IV Push at bedtime  melatonin 3 milliGRAM(s) Oral at bedtime  metoprolol tartrate 12.5 milliGRAM(s) Oral every 12 hours  triamcinolone 0.1% Ointment 1 Application(s) Topical every 12 hours    MEDICATIONS  (PRN):      Vital Signs Last 24 Hrs  T(C): 36.9 (2021 04:50), Max: 36.9 (2021 04:50)  T(F): 98.4 (2021 04:50), Max: 98.4 (2021 04:50)  HR: 96 (2021 06:09) (84 - 106)  BP: 136/80 (2021 04:50) (107/70 - 136/80)  BP(mean): --  RR: 18 (2021 04:50) (18 - 30)  SpO2: 98% (2021 06:09) (95% - 100%)      PHYSICAL EXAM  GENERAL: in mild to moderate respiratory distress, AAOX3, speaking in full sentences   HEAD:  Atraumatic, Normocephalic  EYES: Conjunctiva and sclera clear  CHEST/LUNG: Clear to auscultation bilaterally; No wheeze  HEART: Regular rate and rhythm; No murmurs, rubs, or gallops  ABDOMEN: Soft, Nontender, Nondistended; Bowel sounds present  EXTREMITIES:  2+ Peripheral Pulses, No clubbing, cyanosis, or edema  PSYCH: AAOx3  SKIN: Diffuse urticarial rash over UE     CAPILLARY BLOOD GLUCOSE      POCT Blood Glucose.: 104 mg/dL (2021 21:33)  POCT Blood Glucose.: 93 mg/dL (2021 17:09)  POCT Blood Glucose.: 92 mg/dL (2021 13:45)    I&O's Summary    2021 07:01  -  2021 06:29  --------------------------------------------------------  IN: 300 mL / OUT: 700 mL / NET: -400 mL        LABS:                        10.3   10.37 )-----------( 330      ( 2021 05:14 )             34.3     01-13    143  |  109<H>  |  55<H>  ----------------------------<  83  3.6   |  20<L>  |  2.07<H>    Ca    9.0      2021 05:14  Phos  4.8     -13  Mg     2.5         TPro  8.0  /  Alb  4.0  /  TBili  0.5  /  DBili  x   /  AST  31  /  ALT  21  /  AlkPhos  103  01-13    PT/INR - ( 2021 17:42 )   PT: 13.7 sec;   INR: 1.15 ratio         PTT - ( 2021 17:42 )  PTT:52.2 sec      Urinalysis Basic - ( 2021 19:28 )    Color: Light Yellow / Appearance: Clear / S.011 / pH: x  Gluc: x / Ketone: Negative  / Bili: Negative / Urobili: Negative   Blood: x / Protein: 30 mg/dL / Nitrite: Negative   Leuk Esterase: Moderate / RBC: 1 /hpf / WBC 5 /HPF   Sq Epi: x / Non Sq Epi: 1 /hpf / Bacteria: Negative

## 2021-01-14 NOTE — PROGRESS NOTE ADULT - ATTENDING COMMENTS
CARDIOLOGY ATTENDING    Patient seen and examined. Agree with above. Continue diuresis, would start dobutamine if Cr rises tomorrow. Recommend vascular consult given decreased pulses of left foot. Consider CHF consult. Poor candidate for a primary prevention ICD given NYHA class IV CHF.

## 2021-01-14 NOTE — PROGRESS NOTE ADULT - PROBLEM SELECTOR PLAN 2
-Clinical signs of volume overload. CXR shows pulmonary edema and elevated proBNP.   -Last TTE showed EF of 21% with global LV dysfunction  -C/w metoprolol 12.5 mg BID and Bumex 4 mg BID   -Cardiology consulted for GDMT  -F/u with repeat TTE -Clinical signs of volume overload. CXR shows pulmonary edema and elevated proBNP.   -TTE showed EF of 20% with global LV and RV dysfunction  -C/w metoprolol 12.5 mg BID and Bumex 4 mg BID   -Cardiology consulted for GDMT

## 2021-01-14 NOTE — PROGRESS NOTE ADULT - PROBLEM SELECTOR PLAN 5
-Takes 60 units of lantus at bedtime and 18 units of humalog   -Will dose lantus based on current FS   -C/w 10 units admelog premeal

## 2021-01-14 NOTE — PROGRESS NOTE ADULT - PROBLEM SELECTOR PROBLEM 7
Presbyopia: Rx was given for corrective spectacles if indicated. Hyperlipidemia, unspecified hyperlipidemia type

## 2021-01-15 DIAGNOSIS — I34.0 NONRHEUMATIC MITRAL (VALVE) INSUFFICIENCY: ICD-10-CM

## 2021-01-15 DIAGNOSIS — I25.118 ATHEROSCLEROTIC HEART DISEASE OF NATIVE CORONARY ARTERY WITH OTHER FORMS OF ANGINA PECTORIS: ICD-10-CM

## 2021-01-15 LAB
ANION GAP SERPL CALC-SCNC: 14 MMOL/L — SIGNIFICANT CHANGE UP (ref 5–17)
BASE EXCESS BLDV CALC-SCNC: -4.6 MMOL/L — LOW (ref -2–2)
BUN SERPL-MCNC: 65 MG/DL — HIGH (ref 7–23)
CALCIUM SERPL-MCNC: 9.4 MG/DL — SIGNIFICANT CHANGE UP (ref 8.4–10.5)
CHLORIDE SERPL-SCNC: 107 MMOL/L — SIGNIFICANT CHANGE UP (ref 96–108)
CO2 BLDV-SCNC: 22 MMOL/L — SIGNIFICANT CHANGE UP (ref 22–30)
CO2 SERPL-SCNC: 20 MMOL/L — LOW (ref 22–31)
CREAT SERPL-MCNC: 2.11 MG/DL — HIGH (ref 0.5–1.3)
GAS PNL BLDV: SIGNIFICANT CHANGE UP
GLUCOSE BLDC GLUCOMTR-MCNC: 125 MG/DL — HIGH (ref 70–99)
GLUCOSE BLDC GLUCOMTR-MCNC: 234 MG/DL — HIGH (ref 70–99)
GLUCOSE BLDC GLUCOMTR-MCNC: 243 MG/DL — HIGH (ref 70–99)
GLUCOSE BLDC GLUCOMTR-MCNC: 264 MG/DL — HIGH (ref 70–99)
GLUCOSE SERPL-MCNC: 207 MG/DL — HIGH (ref 70–99)
HCO3 BLDV-SCNC: 21 MMOL/L — SIGNIFICANT CHANGE UP (ref 21–29)
HCT VFR BLD CALC: 32.4 % — LOW (ref 34.5–45)
HGB BLD-MCNC: 9.5 G/DL — LOW (ref 11.5–15.5)
LACTATE BLDV-MCNC: 1.9 MMOL/L — SIGNIFICANT CHANGE UP (ref 0.7–2)
MAGNESIUM SERPL-MCNC: 2.4 MG/DL — SIGNIFICANT CHANGE UP (ref 1.6–2.6)
MCHC RBC-ENTMCNC: 26.5 PG — LOW (ref 27–34)
MCHC RBC-ENTMCNC: 29.3 GM/DL — LOW (ref 32–36)
MCV RBC AUTO: 90.5 FL — SIGNIFICANT CHANGE UP (ref 80–100)
NRBC # BLD: 0 /100 WBCS — SIGNIFICANT CHANGE UP (ref 0–0)
PCO2 BLDV: 45 MMHG — SIGNIFICANT CHANGE UP (ref 35–50)
PH BLDV: 7.29 — LOW (ref 7.35–7.45)
PHOSPHATE SERPL-MCNC: 4.3 MG/DL — SIGNIFICANT CHANGE UP (ref 2.5–4.5)
PLATELET # BLD AUTO: 320 K/UL — SIGNIFICANT CHANGE UP (ref 150–400)
PO2 BLDV: 33 MMHG — SIGNIFICANT CHANGE UP (ref 25–45)
POTASSIUM SERPL-MCNC: 4 MMOL/L — SIGNIFICANT CHANGE UP (ref 3.5–5.3)
POTASSIUM SERPL-SCNC: 4 MMOL/L — SIGNIFICANT CHANGE UP (ref 3.5–5.3)
RBC # BLD: 3.58 M/UL — LOW (ref 3.8–5.2)
RBC # FLD: 17.6 % — HIGH (ref 10.3–14.5)
SAO2 % BLDV: 41 % — LOW (ref 67–88)
SODIUM SERPL-SCNC: 141 MMOL/L — SIGNIFICANT CHANGE UP (ref 135–145)
WBC # BLD: 10.89 K/UL — HIGH (ref 3.8–10.5)
WBC # FLD AUTO: 10.89 K/UL — HIGH (ref 3.8–10.5)

## 2021-01-15 PROCEDURE — 93923 UPR/LXTR ART STDY 3+ LVLS: CPT | Mod: 26

## 2021-01-15 PROCEDURE — 99223 1ST HOSP IP/OBS HIGH 75: CPT | Mod: GC

## 2021-01-15 PROCEDURE — 99233 SBSQ HOSP IP/OBS HIGH 50: CPT | Mod: GC

## 2021-01-15 RX ORDER — INSULIN GLARGINE 100 [IU]/ML
25 INJECTION, SOLUTION SUBCUTANEOUS AT BEDTIME
Refills: 0 | Status: DISCONTINUED | OUTPATIENT
Start: 2021-01-15 | End: 2021-01-16

## 2021-01-15 RX ORDER — HYDRALAZINE HCL 50 MG
10 TABLET ORAL THREE TIMES A DAY
Refills: 0 | Status: DISCONTINUED | OUTPATIENT
Start: 2021-01-15 | End: 2021-01-16

## 2021-01-15 RX ORDER — ONDANSETRON 8 MG/1
4 TABLET, FILM COATED ORAL ONCE
Refills: 0 | Status: DISCONTINUED | OUTPATIENT
Start: 2021-01-15 | End: 2021-01-15

## 2021-01-15 RX ADMIN — CALAMINE AND ZINC OXIDE AND PHENOL 1 APPLICATION(S): 160; 10 LOTION TOPICAL at 13:05

## 2021-01-15 RX ADMIN — BUMETANIDE 132 MILLIGRAM(S): 0.25 INJECTION INTRAMUSCULAR; INTRAVENOUS at 17:58

## 2021-01-15 RX ADMIN — Medication 10 UNIT(S): at 07:40

## 2021-01-15 RX ADMIN — Medication 1 APPLICATION(S): at 05:12

## 2021-01-15 RX ADMIN — HEPARIN SODIUM 5000 UNIT(S): 5000 INJECTION INTRAVENOUS; SUBCUTANEOUS at 13:05

## 2021-01-15 RX ADMIN — Medication 81 MILLIGRAM(S): at 11:10

## 2021-01-15 RX ADMIN — Medication 2: at 16:55

## 2021-01-15 RX ADMIN — INSULIN GLARGINE 25 UNIT(S): 100 INJECTION, SOLUTION SUBCUTANEOUS at 21:59

## 2021-01-15 RX ADMIN — Medication 12.5 MILLIGRAM(S): at 17:18

## 2021-01-15 RX ADMIN — CALAMINE AND ZINC OXIDE AND PHENOL 1 APPLICATION(S): 160; 10 LOTION TOPICAL at 21:59

## 2021-01-15 RX ADMIN — Medication 1: at 21:59

## 2021-01-15 RX ADMIN — Medication 1 APPLICATION(S): at 17:18

## 2021-01-15 RX ADMIN — BUMETANIDE 132 MILLIGRAM(S): 0.25 INJECTION INTRAMUSCULAR; INTRAVENOUS at 05:09

## 2021-01-15 RX ADMIN — Medication 25 MICROGRAM(S): at 22:00

## 2021-01-15 RX ADMIN — CLOPIDOGREL BISULFATE 75 MILLIGRAM(S): 75 TABLET, FILM COATED ORAL at 11:10

## 2021-01-15 RX ADMIN — Medication 10 UNIT(S): at 16:55

## 2021-01-15 RX ADMIN — Medication 3 MILLIGRAM(S): at 21:58

## 2021-01-15 RX ADMIN — Medication 12.5 MILLIGRAM(S): at 05:11

## 2021-01-15 RX ADMIN — HEPARIN SODIUM 5000 UNIT(S): 5000 INJECTION INTRAVENOUS; SUBCUTANEOUS at 22:00

## 2021-01-15 RX ADMIN — Medication 2: at 07:40

## 2021-01-15 RX ADMIN — HEPARIN SODIUM 5000 UNIT(S): 5000 INJECTION INTRAVENOUS; SUBCUTANEOUS at 05:11

## 2021-01-15 RX ADMIN — Medication 10 UNIT(S): at 11:43

## 2021-01-15 RX ADMIN — ATORVASTATIN CALCIUM 80 MILLIGRAM(S): 80 TABLET, FILM COATED ORAL at 21:58

## 2021-01-15 RX ADMIN — Medication 10 MILLIGRAM(S): at 21:59

## 2021-01-15 RX ADMIN — CALAMINE AND ZINC OXIDE AND PHENOL 1 APPLICATION(S): 160; 10 LOTION TOPICAL at 05:11

## 2021-01-15 NOTE — PROGRESS NOTE ADULT - PROBLEM SELECTOR PLAN 3
Raised urticarial rash over UE and back   -Derm consulted, recs as below:  -clobetasol 0.05% ointment BID   -permethrin 5% cream, apply to whole body from neck down at bedtime x 1 night and rinse off in morning (8-10 hours later). Repeat again in 1 week

## 2021-01-15 NOTE — PROGRESS NOTE ADULT - SUBJECTIVE AND OBJECTIVE BOX
S: Unchanged SOB on 4L NC but complaining of b/l foot pain more severe on left side. Denies chest pain. Review of systems otherwise (-)    Review of Systems:   Constitutional: [ ] fevers, [ ] chills.   Skin: [ ] dry skin. [ ] rashes.  Psychiatric: [ ] depression, [ ] anxiety.   Gastrointestinal: [ ] BRBPR, [ ] melena.   Neurological: [ ] confusion. [ ] seizures. [ ] shuffling gait.   Ears,Nose,Mouth and Throat: [ ] ear pain [ ] sore throat.   Eyes: [ ] diplopia.   Respiratory: [ ] hemoptysis. [ ] shortness of breath  Cardiovascular: See HPI above  Hematologic/Lymphatic: [ ] anemia. [ ] painful nodes. [ ] prolonged bleeding.   Genitourinary: [ ] hematuria. [ ] flank pain.   Endocrine: [ ] significant change in weight. [ ] intolerance to heat and cold.     Review of systems [x ] otherwise negative, [ ] otherwise unable to obtain    FH: no family history of sudden cardiac death in first degree relatives    SH: [ ] tobacco, [ ] alcohol, [ ] drugs      MEDICATIONS  (STANDING):  aspirin enteric coated 81 milliGRAM(s) Oral daily  atorvastatin 80 milliGRAM(s) Oral at bedtime  buMETAnide IVPB 4 milliGRAM(s) IV Intermittent every 12 hours  calamine/zinc oxide Lotion 1 Application(s) Topical three times a day  clobetasol 0.05% Cream 1 Application(s) Topical every 12 hours  clopidogrel Tablet 75 milliGRAM(s) Oral daily  dextrose 40% Gel 15 Gram(s) Oral once  dextrose 5%. 1000 milliLiter(s) (50 mL/Hr) IV Continuous <Continuous>  dextrose 5%. 1000 milliLiter(s) (100 mL/Hr) IV Continuous <Continuous>  dextrose 50% Injectable 25 Gram(s) IV Push once  dextrose 50% Injectable 12.5 Gram(s) IV Push once  dextrose 50% Injectable 25 Gram(s) IV Push once  glucagon  Injectable 1 milliGRAM(s) IntraMuscular once  heparin   Injectable 5000 Unit(s) SubCutaneous every 8 hours  insulin glargine Injectable (LANTUS) 20 Unit(s) SubCutaneous at bedtime  insulin lispro (ADMELOG) corrective regimen sliding scale   SubCutaneous three times a day before meals  insulin lispro (ADMELOG) corrective regimen sliding scale   SubCutaneous at bedtime  insulin lispro Injectable (ADMELOG) 10 Unit(s) SubCutaneous three times a day before meals  levothyroxine Injectable 25 MICROGram(s) IV Push at bedtime  melatonin 3 milliGRAM(s) Oral at bedtime  metoprolol tartrate 12.5 milliGRAM(s) Oral every 12 hours    MEDICATIONS  (PRN):      LABS:                          9.5    10.89 )-----------( 320      ( 15 Dusty 2021 07:04 )             32.4     Hemoglobin: 9.5 g/dL (01-15 @ 07:04)  Hemoglobin: 9.8 g/dL (01-14 @ 07:06)  Hemoglobin: 10.3 g/dL (01-13 @ 05:14)  Hemoglobin: 11.0 g/dL (01-12 @ 17:42)    01-15    141  |  107  |  65<H>  ----------------------------<  207<H>  4.0   |  20<L>  |  2.11<H>    Ca    9.4      15 Dusty 2021 07:00  Phos  4.3     01-15  Mg     2.4     01-15    TPro  7.7  /  Alb  3.6  /  TBili  0.6  /  DBili  x   /  AST  35  /  ALT  27  /  AlkPhos  108  01-14    Creatinine Trend: 2.11<--, 1.94<--, 2.07<--, 2.15<--             01-14-21 @ 07:01  -  01-15-21 @ 07:00  --------------------------------------------------------  IN: 1040 mL / OUT: 1700 mL / NET: -660 mL    01-15-21 @ 07:01  -  01-15-21 @ 13:06  --------------------------------------------------------  IN: 60 mL / OUT: 0 mL / NET: 60 mL        PHYSICAL EXAM  Vital Signs Last 24 Hrs  T(C): 36.5 (15 Dusty 2021 11:27), Max: 36.9 (14 Jan 2021 20:15)  T(F): 97.7 (15 Dusty 2021 11:27), Max: 98.4 (14 Jan 2021 20:15)  HR: 90 (15 Dusty 2021 11:27) (84 - 96)  BP: 122/66 (15 Dusty 2021 11:27) (116/69 - 146/83)  BP(mean): --  RR: 18 (15 Dusty 2021 11:27) (18 - 18)  SpO2: 99% (15 Dusty 2021 11:27) (99% - 100%)      General: Well nourished in no acute distress. Alert and Oriented * 3.   Head: Normocephalic and atraumatic.   Neck: No JVD. No bruits. Supple. Does not appear to be enlarged.   Cardiovascular: + S1,S2 ; RRR Soft systolic murmur at the left lower sternal border. No rubs noted.    Lungs: Decrease BS at bases b/l  Abdomen: + BS, soft. Non tender. Non distended. No rebound. No guarding.   Extremities: No clubbing/cyanosis/edema. Difficulty palpating DP/PT pulses, found on doppler but appears weakened at Left DP  Neurologic: Moves all four extremities. Full range of motion.   Skin: Warm and moist. The patient's skin has normal elasticity and good skin turgor.   Psychiatric: Appropriate mood and affect.  Musculoskeletal: Normal range of motion, normal strength    TELEMETRY: NSR 80-90  ECG:  NSR,. iRBBB.  Echo: 2019 21% EF, severe PHTN, normal LA size, s/p MitraClip  NST: 2018. Prior LCx territory MI.      < from: TTE with Doppler (w/Cont) (01.13.21 @ 08:31) >  Conclusions:  1. Tethered mitral leaflets. Patient status-post mitral  clip placement. Mild-moderate mitral regurgitation. Mean  transmitral valve gradient equals 7-8 mm Hg. (HRabout 80s  bpm)  2. Aortic valve not well visualized; appears to be a  calcified trileaflet valve with mildly decreased opening.  Minimal aortic regurgitation.  3. Endocardial visualization enhanced with intravenous  injection of Ultrasonic Enhancing Agent (Definity). Severe  global left ventricular systolic dysfunction with regional  variation. No left ventricular thrombus. Septal motion  consistent with prior cardiac surgery.  4. Normal right ventricular size with decreased right  ventricular systolic function.  5. Estimated pulmonary artery systolic pressure equals 49  mm Hg, assuming right atrial pressure equals 8 mm Hg,  consistent with mild pulmonary pressures.  *** Compared with echocardiogram of 10/13/2019, results are  similar on today's study. Mean transmitral gradient is  increased on today's study in setting of faster heart rate.    < end of copied text >    ASSESSMENT/PLAN: 75y Female Maori speaking h/o HTN, HLD, DM, CAD s/p CABG 2014 and prior PCI, severe mitral regurgitation (s/p mitral clip 2019), pulmonary HTN, HF (EF 21 % June 2019), CKD IV not on dialysis (b/l SCr 2-2.2), hypothyroidism BIBEMS 2/2 worsening work of breathing presents in acute on chronic systolic CHF with NYHA IV functional status.    - Continue BID IV high-dose Bumex - continue to trend creatinine and if increasing tomorrow would start dobutamine, discussed with RN importance of STRICT I/Os  - Replace K to 4-4.5 (vigorously, in anticipation of diuresis), and keep Mg 2.  - I have a low threshold to request starting inotropes for management of her heart failure, given lack of ability to tolerate GDMT and frequent admissions.    - Would maintain low dose metoprolol at current dose while diuresing  - Repeat TTE noted above with EF 20%  - May consider RHC at some point on this admission, once closer to euvolemic.  - Rec vascular consult given decreased LLE pulses and foot pain  - Further recs pending above    Amauri Hill PA-C  Pager: [phone]

## 2021-01-15 NOTE — CONSULT NOTE ADULT - SUBJECTIVE AND OBJECTIVE BOX
In-House Cardiology Consult Note  ---------------------------------------------    HPI:  74 yo F with HTN, HLD, DM2 (A1c 7.5 10/19), CAD s/p CABG (LIMA to LAD, SVG to OM, SVG to PDA) and prior PCI, ICM HFrEF (EF 20-25%), severe mitral regurgitation s/p mitral clip (6/19), severe pulmonary HTN, CKD IV (b/l Cr 1.9-2.2), hypothyroidism who BIBEMS 2/2 worsening work of breathing presents in acute on chronic systolic CHF with NYHA IV functional status.    Per patient and daughter, patient has been experiencing increased work of breathing over the past few weeks, which has been getting progressively worse; the worst was the day prior to presentation, causing family to call EMS.     Patient states she "feels tired from breathing" and small activities such as eating or walking around her house make her very weak. Patient does endorse full body itching chronically and chronic RLE pain exacerbated with walking.     Patient's daughter and caretaker states that patient has been progressively deteriorating.     Daughter states that patient appears to be weaker than during her last hospital admission in 2019, where her EF was estimated to be 21 % on TTE.     Of note patient was admitted to CCU for acute decompensated HF in Oct 2019 with elevated trops to 1800, patient was diuresed, became hypotensive, eventually weaned off pressors and discharged on midodrine. TTE during that time (June 2019) showed showed EF 21%, Severe global left ventricular systolic dysfunction. Mild-moderate tricuspid regurgitation, and severe pulm hypertension. On prior hospitalizations, has required balloon pump assist for diuresis.      Her blood pressure is apparently very labile and she in on next-to-no GDMT... just a very small dose of beta blockers, and even using Midodrine to keep her BP up. Has only tried Isosorbide briefly and it was discontinued.    Per EMS: Patient found to have increase work of breathing at home sating 96% RA and given 3L NC en-route. During this hospitalization, she has been diuresed with Bumex 4mg IV BID, but with net negative 1.4L during this admission and rising sCr.     She has refused ICD implant for primary prevention of sudden cardiac arrest on numerous prior occasions.      Telemetry: NSR with PVCs    PMHx:   H/O pulmonary hypertension  Heart failure  GERD (gastroesophageal reflux disease)  Hypertension  CAD (coronary artery disease)  Hypothyroidism  Hyperlipidemia  Diabetes mellitus, type 2    PSHx:   S/P mitral valve clip implantation  S/P CABG (coronary artery bypass graft)    Allergies:  azithromycin (Hives; Pruritus)    Home Meds:  ASA 81mg  Lipitor 80mg  Bumex 2mg PO BID  Plavix 75mg PO  Lopressor 12.5mg PO BID  Midodrine 10mg PO TID    Current Medications:   aspirin enteric coated 81 milliGRAM(s) Oral daily  atorvastatin 80 milliGRAM(s) Oral at bedtime  buMETAnide IVPB 4 milliGRAM(s) IV Intermittent every 12 hours  calamine/zinc oxide Lotion 1 Application(s) Topical three times a day  clobetasol 0.05% Cream 1 Application(s) Topical every 12 hours  clopidogrel Tablet 75 milliGRAM(s) Oral daily  dextrose 40% Gel 15 Gram(s) Oral once  dextrose 5%. 1000 milliLiter(s) IV Continuous <Continuous>  dextrose 5%. 1000 milliLiter(s) IV Continuous <Continuous>  dextrose 50% Injectable 25 Gram(s) IV Push once  dextrose 50% Injectable 12.5 Gram(s) IV Push once  dextrose 50% Injectable 25 Gram(s) IV Push once  glucagon  Injectable 1 milliGRAM(s) IntraMuscular once  heparin   Injectable 5000 Unit(s) SubCutaneous every 8 hours  insulin glargine Injectable (LANTUS) 20 Unit(s) SubCutaneous at bedtime  insulin lispro (ADMELOG) corrective regimen sliding scale   SubCutaneous three times a day before meals  insulin lispro (ADMELOG) corrective regimen sliding scale   SubCutaneous at bedtime  insulin lispro Injectable (ADMELOG) 10 Unit(s) SubCutaneous three times a day before meals  levothyroxine Injectable 25 MICROGram(s) IV Push at bedtime  melatonin 3 milliGRAM(s) Oral at bedtime  metolazone 2.5 milliGRAM(s) Oral <User Schedule>  metoprolol tartrate 12.5 milliGRAM(s) Oral every 12 hours      FAMILY HISTORY:  Family history of hypertension (Sibling)  sister    Family history of diabetes mellitus (Sibling)  brothers/sisters        Social History:  Smoking History:  Alcohol Use:  Drug Use:    REVIEW OF SYSTEMS:  CONSTITUTIONAL: No weakness, fevers or chills  EYES/ENT: No visual changes;  No dysphagia  NECK: No pain or stiffness  RESPIRATORY: No cough, wheezing, hemoptysis; No shortness of breath  CARDIOVASCULAR: No chest pain or palpitations; No lower extremity edema  GASTROINTESTINAL: No abdominal or epigastric pain. No nausea, vomiting, or hematemesis; No diarrhea or constipation. No melena or hematochezia.  BACK: No back pain  GENITOURINARY: No dysuria, frequency or hematuria  NEUROLOGICAL: No numbness or weakness  SKIN: No itching, burning, rashes, or lesions   All other review of systems is negative unless indicated above.    Physical Exam:  T(F): 97.7 (01-15), Max: 98.4 (01-14)  HR: 90 (01-15) (84 - 96)  BP: 122/66 (01-15) (116/69 - 146/83)  RR: 18 (01-15)  SpO2: 99% (01-15)  GENERAL: No acute distress, well-developed  HEAD:  Atraumatic, Normocephalic  ENT: EOMI, PERRLA, conjunctiva and sclera clear, Neck supple, No JVD, moist mucosa  CHEST/LUNG: Clear to auscultation bilaterally; No wheeze, equal breath sounds bilaterally   BACK: No spinal tenderness  HEART: Regular rate and rhythm; No murmurs, rubs, or gallops  ABDOMEN: Soft, Nontender, Nondistended; Bowel sounds present  EXTREMITIES:  No clubbing, cyanosis, or edema  PSYCH: Nl behavior, nl affect  NEUROLOGY: AAOx3, non-focal, cranial nerves intact  SKIN: Normal color, No rashes or lesions  LINES:    CXR: Personally reviewed    Labs: Personally reviewed                        9.5    10.89 )-----------( 320      ( 15 Dusty 2021 07:04 )             32.4     01-15    141  |  107  |  65<H>  ----------------------------<  207<H>  4.0   |  20<L>  |  2.11<H>    Ca    9.4      15 Dusty 2021 07:00  Phos  4.3     01-15  Mg     2.4     01-15    TPro  7.7  /  Alb  3.6  /  TBili  0.6  /  DBili  x   /  AST  35  /  ALT  27  /  AlkPhos  108  01-14        CARDIAC MARKERS ( 13 Jan 2021 05:14 )  55 ng/L / x     / x     / x     / x     / x      CARDIAC MARKERS ( 12 Jan 2021 17:42 )  56 ng/L / x     / x     / x     / x     / x            Serum Pro-Brain Natriuretic Peptide: 5178 pg/mL (01-12 @ 17:42)        Thyroid Stimulating Hormone, Serum: 2.15 uIU/mL (01-13 @ 10:07)     In-House Cardiology Consult Note  ---------------------------------------------    HPI:  72 yo F with HTN, HLD, DM2 (A1c 7.5 10/19), CAD s/p CABG (LIMA to LAD, SVG to OM, SVG to PDA) and prior PCI, ICM HFrEF (EF 20-25%), severe mitral regurgitation s/p mitral clip (6/19), severe pulmonary HTN, CKD IV (b/l Cr 1.9-2.2), hypothyroidism who BIBEMS 2/2 worsening work of breathing presents in acute on chronic systolic CHF with NYHA IV functional status.    Per patient and daughter, patient has been experiencing increased work of breathing over the past few weeks, which has been getting progressively worse; the worst was the day prior to presentation, causing family to call EMS.     Patient states she "feels tired from breathing" and small activities such as eating or walking around her house make her very weak. Patient does endorse full body itching chronically and chronic RLE pain exacerbated with walking.     Patient's daughter and caretaker states that patient has been progressively deteriorating.     Daughter states that patient appears to be weaker than during her last hospital admission in 2019, where her EF was estimated to be 21 % on TTE.     Of note patient was admitted to CCU for acute decompensated HF in Oct 2019 with elevated trops to 1800, patient was diuresed, became hypotensive, eventually weaned off pressors and discharged on midodrine. TTE during that time (June 2019) showed showed EF 21%, Severe global left ventricular systolic dysfunction. Mild-moderate tricuspid regurgitation, and severe pulm hypertension. On prior hospitalizations, has required balloon pump assist for diuresis.      Per EMS: Patient found to have increase work of breathing at home sating 96% RA and given 3L NC en-route. During this hospitalization, she has been diuresed with Bumex 4mg IV BID, but with net negative 1.4L during this admission and rising sCr.     She has refused ICD implant for primary prevention of sudden cardiac arrest on numerous prior occasions.      Telemetry: NSR with PVCs    PMHx:   H/O pulmonary hypertension  Heart failure  GERD (gastroesophageal reflux disease)  Hypertension  CAD (coronary artery disease)  Hypothyroidism  Hyperlipidemia  Diabetes mellitus, type 2    PSHx:   S/P mitral valve clip implantation  S/P CABG (coronary artery bypass graft)    Allergies:  azithromycin (Hives; Pruritus)    Home Meds:  ASA 81mg  Lipitor 80mg  Bumex 2mg PO BID  Plavix 75mg PO  Lopressor 12.5mg PO BID  Midodrine 10mg PO TID    Current Medications:   aspirin enteric coated 81 milliGRAM(s) Oral daily  atorvastatin 80 milliGRAM(s) Oral at bedtime  buMETAnide IVPB 4 milliGRAM(s) IV Intermittent every 12 hours  calamine/zinc oxide Lotion 1 Application(s) Topical three times a day  clobetasol 0.05% Cream 1 Application(s) Topical every 12 hours  clopidogrel Tablet 75 milliGRAM(s) Oral daily  dextrose 40% Gel 15 Gram(s) Oral once  dextrose 5%. 1000 milliLiter(s) IV Continuous <Continuous>  dextrose 5%. 1000 milliLiter(s) IV Continuous <Continuous>  dextrose 50% Injectable 25 Gram(s) IV Push once  dextrose 50% Injectable 12.5 Gram(s) IV Push once  dextrose 50% Injectable 25 Gram(s) IV Push once  glucagon  Injectable 1 milliGRAM(s) IntraMuscular once  heparin   Injectable 5000 Unit(s) SubCutaneous every 8 hours  insulin glargine Injectable (LANTUS) 20 Unit(s) SubCutaneous at bedtime  insulin lispro (ADMELOG) corrective regimen sliding scale   SubCutaneous three times a day before meals  insulin lispro (ADMELOG) corrective regimen sliding scale   SubCutaneous at bedtime  insulin lispro Injectable (ADMELOG) 10 Unit(s) SubCutaneous three times a day before meals  levothyroxine Injectable 25 MICROGram(s) IV Push at bedtime  melatonin 3 milliGRAM(s) Oral at bedtime  metolazone 2.5 milliGRAM(s) Oral <User Schedule>  metoprolol tartrate 12.5 milliGRAM(s) Oral every 12 hours    FAMILY HISTORY:  Family history of diabetes mellitus (Sibling)  brothers/sisters    REVIEW OF SYSTEMS:  Limited by patient factors    Physical Exam:  T(F): 97.7 (01-15), Max: 98.4 (01-14)  HR: 90 (01-15) (84 - 96)  BP: 122/66 (01-15) (116/69 - 146/83)  RR: 18 (01-15)  SpO2: 99% (01-15)  GENERAL: No acute distress, well-developed  HEAD:  Atraumatic, Normocephalic  ENT: EOMI, PERRLA, conjunctiva and sclera clear, Neck supple, moderately elevated JVD, moist mucosa  CHEST/LUNG: Clear to auscultation bilaterally  HEART: Regular rate and rhythm; No murmurs, rubs, or gallops  ABDOMEN: Soft, Nontender, Nondistended; Bowel sounds present  EXTREMITIES:  No clubbing, cyanosis, or edema  PSYCH: Nl behavior, nl affect  NEUROLOGY: AAOx3, non-focal, cranial nerves intact  SKIN: Normal color, No rashes or lesions    CXR: Personally reviewed    Labs: Personally reviewed                        9.5    10.89 )-----------( 320      ( 15 Dusty 2021 07:04 )             32.4     01-15    141  |  107  |  65<H>  ----------------------------<  207<H>  4.0   |  20<L>  |  2.11<H>    Ca    9.4      15 Dusty 2021 07:00  Phos  4.3     01-15  Mg     2.4     01-15    TPro  7.7  /  Alb  3.6  /  TBili  0.6  /  DBili  x   /  AST  35  /  ALT  27  /  AlkPhos  108  01-14        CARDIAC MARKERS ( 13 Jan 2021 05:14 )  55 ng/L / x     / x     / x     / x     / x      CARDIAC MARKERS ( 12 Jan 2021 17:42 )  56 ng/L / x     / x     / x     / x     / x            Serum Pro-Brain Natriuretic Peptide: 5178 pg/mL (01-12 @ 17:42)        Thyroid Stimulating Hormone, Serum: 2.15 uIU/mL (01-13 @ 10:07)

## 2021-01-15 NOTE — PROGRESS NOTE ADULT - PROBLEM SELECTOR PLAN 1
Patient describes increased work of breathing and tachypnea, does not appear to be hypercarbic or hypoxic. However, CXR shows Bilateral predominantly lower lobe hazy opacities with elevated pro-BNP 5178  -Most likely due to worsening HF   -Started bumex 4 mg BID   - C/w metoprolol 12.5 mg BID. No ACE-i or ARBs at home    - TTE showed EF 20%  - Cardiology consulted for GDMT, see below  - Low suspicion for PNA. No clinical signs of PNA.   - Blood cx and urine cx are negative   - Monitor off abx for now   - CT chest showed no focal consolidation

## 2021-01-15 NOTE — CONSULT NOTE ADULT - ASSESSMENT
74 yo F with HTN, HLD, DM2 (A1c 7.5 10/19), CAD s/p CABG (LIMA to LAD, SVG to OM, SVG to PDA) and prior PCI, ICM HFrEF (EF 20-25%), severe mitral regurgitation s/p mitral clip (6/19), severe pulmonary HTN, CKD IV (b/l Cr 1.9-2.2), hypothyroidism who BIBEMS 2/2 worsening work of breathing presents in acute on chronic systolic CHF with NYHA IV functional status. HF Team consulted for recommendations on diuresis and titration of GDMT.    Review of Relevant Studies.  RHC 10/2019: CVP 5-6, PA 46/14 (24), mixed 55%, CO 4.28. CI 2.9, SVR 1000  Echo 1/2021: LVIDd 4.7, EF 20%, mild-moderate MR with mitraclip well seated, RVSP 49, mild pulmHTN with RV systolic dysfunction

## 2021-01-15 NOTE — PROGRESS NOTE ADULT - ASSESSMENT
75y.o F Vietnamese speaking h/o HTN, HLD, DM, CAD s/p CABG 2014 and prior PCI, severe mitral regurgitation (s/p mitral clip 2019), pulmonary HTN (TTE 2019), HF (EF 21 % June 2019), CKD IV not on dialysis (b/l SCr 2-2.2), hypothyroidism BIBEMS 2/2 worsening work of breathing, found to have crackles, elevated proBNP and b/l pulmonary edema on CXR c/w acute exacerbation of CHF.

## 2021-01-15 NOTE — PROGRESS NOTE ADULT - ASSESSMENT
74 yo F w/ PMH of CKD stage 4 (baseline Cr 1.9-2.2) HTN, T2DM, CAD s/p CABG X3 (2014 at [facility]), non-dilated ICM (EF 20-25%), severe mitral regurgitation s/p mitral clip (6/13/19), severe pulm HTN, hypothyroidism who presented for evaluation of SOB and was admitted for ADHF.    CKD stage 4  - baseline Cr 1.9-2.2; has had recurrent MERVIN's over the years  - CKD is likely 2/2 recurrent MERVIN's + underlying DM/HTN  - Scr fluctuating however remains within baseline   - Agree w. IV diuretics per heart failure team   - renal sonogram in the past with simple renal cyst  - Avoid nephrotoxins including NSAIDs, IV contrast (if possible), [employer] products  - monitor I/O's accurately    HTN  BP controlled    Anemia  Hb acceptable     Proteinuria/Hematuria  - likely from underlying DM  - has 1.8 grams proteinuria  - Hep B, Hep C, HIV RF, C3, C4, p-ANCA, c-ANCA, RPR neg  - weak gamma-migrating protein, weak IgG lambda protein band noted in the past. Pt to follow w/ heme   - DEAN 1:320 positive  - RPR neg, FTA +

## 2021-01-15 NOTE — PROGRESS NOTE ADULT - SUBJECTIVE AND OBJECTIVE BOX
[facility] [place] [facility]   MD Dion Dasilva MD, D.O. Ruoru Wong, PA    From 7 AM - 5 PM:  OFFICE: [phone]  Dr. Muhammad cell: [phone]  Dr. Montero cell: [phone]  Dr. Soria cell: [phone]  RASHIDA Smith cell: [phone]    From 5 PM - 7 AM: Answering Service: [phone]  Date of service: 01-15-21 @ 16:53    RENAL FOLLOW UP NOTE  --------------------------------------------------------------------------------  HPI  Pt seen and examined at bedside.     PAST HISTORY  --------------------------------------------------------------------------------  No significant changes to PMH, PSH, FHx, SHx, unless otherwise noted    ALLERGIES & MEDICATIONS  --------------------------------------------------------------------------------  Allergies    azithromycin (Hives; Pruritus)    Intolerances      Standing Inpatient Medications  aspirin enteric coated 81 milliGRAM(s) Oral daily  atorvastatin 80 milliGRAM(s) Oral at bedtime  buMETAnide IVPB 4 milliGRAM(s) IV Intermittent every 12 hours  calamine/zinc oxide Lotion 1 Application(s) Topical three times a day  clobetasol 0.05% Cream 1 Application(s) Topical every 12 hours  clopidogrel Tablet 75 milliGRAM(s) Oral daily  dextrose 40% Gel 15 Gram(s) Oral once  dextrose 5%. 1000 milliLiter(s) IV Continuous <Continuous>  dextrose 5%. 1000 milliLiter(s) IV Continuous <Continuous>  dextrose 50% Injectable 25 Gram(s) IV Push once  dextrose 50% Injectable 12.5 Gram(s) IV Push once  dextrose 50% Injectable 25 Gram(s) IV Push once  glucagon  Injectable 1 milliGRAM(s) IntraMuscular once  heparin   Injectable 5000 Unit(s) SubCutaneous every 8 hours  hydrALAZINE 10 milliGRAM(s) Oral three times a day  insulin glargine Injectable (LANTUS) 25 Unit(s) SubCutaneous at bedtime  insulin lispro (ADMELOG) corrective regimen sliding scale   SubCutaneous three times a day before meals  insulin lispro (ADMELOG) corrective regimen sliding scale   SubCutaneous at bedtime  insulin lispro Injectable (ADMELOG) 10 Unit(s) SubCutaneous three times a day before meals  levothyroxine Injectable 25 MICROGram(s) IV Push at bedtime  melatonin 3 milliGRAM(s) Oral at bedtime  metolazone 2.5 milliGRAM(s) Oral <User Schedule>  metoprolol tartrate 12.5 milliGRAM(s) Oral every 12 hours    PRN Inpatient Medications      REVIEW OF SYSTEMS  --------------------------------------------------------------------------------  General: no fever  CVS: no chest pain  RESP: no sob, no cough  ABD: no abdominal pain  : no dysuri  MSK: no edema     VITALS/PHYSICAL EXAM  --------------------------------------------------------------------------------  T(C): 36.7 (01-15-21 @ 14:56), Max: 36.9 (01-14-21 @ 20:15)  HR: 94 (01-15-21 @ 16:45) (84 - 96)  BP: 131/68 (01-15-21 @ 14:56) (116/69 - 146/83)  RR: 18 (01-15-21 @ 16:45) (18 - 18)  SpO2: 98% (01-15-21 @ 16:45) (98% - 100%)  Wt(kg): --        01-14-21 @ 07:01  -  01-15-21 @ 07:00  --------------------------------------------------------  IN: 1040 mL / OUT: 1700 mL / NET: -660 mL    01-15-21 @ 07:01  -  01-15-21 @ 16:53  --------------------------------------------------------  IN: 60 mL / OUT: 400 mL / NET: -340 mL      Physical Exam:  	Gen: NAD  	HEENT: MMM  	Pulm: CTA B/L  	CV: S1S2  	Abd: Soft, +BS  	Ext: No LE edema B/L                      Neuro: Awake   	Skin: Warm and Dry       LABS/STUDIES  --------------------------------------------------------------------------------              9.5    10.89 >-----------<  320      [01-15-21 @ 07:04]              32.4     141  |  107  |  65  ----------------------------<  207      [01-15-21 @ 07:00]  4.0   |  20  |  2.11        Ca     9.4     [01-15-21 @ 07:00]      Mg     2.4     [01-15-21 @ 07:00]      Phos  4.3     [01-15-21 @ 07:00]    TPro  7.7  /  Alb  3.6  /  TBili  0.6  /  DBili  x   /  AST  35  /  ALT  27  /  AlkPhos  108  [01-14-21 @ 07:06]    Creatinine Trend:  SCr 2.11 [01-15 @ 07:00]  SCr 1.94 [01-14 @ 07:06]  SCr 2.07 [01-13 @ 05:14]  SCr 2.15 [01-12 @ 17:42]    Urinalysis - [01-12-21 @ 19:28]      Color Light Yellow / Appearance Clear / SG 1.011 / pH 6.0      Gluc Negative / Ketone Negative  / Bili Negative / Urobili Negative       Blood Negative / Protein 30 mg/dL / Leuk Est Moderate / Nitrite Negative      RBC 1 / WBC 5 / Hyaline 4 / Gran  / Sq Epi  / Non Sq Epi 1 / Bacteria Negative      Ferritin 111      [01-13-21 @ 01:42]  HbA1c 7.5      [10-08-19 @ 14:30]  TSH 2.15      [01-13-21 @ 10:07]

## 2021-01-15 NOTE — PROGRESS NOTE ADULT - ATTENDING COMMENTS
75F w/ PMHx of severe ICM (EF 20%), severe MR s/p mitraclip, HTN, HLD, DM2, CKD b/l CR~2 p/w sob. Suspect acute on chronic chf exacerbation. PNA less likely, can dc abx. She has been poorly tolerant of ACEi/ARB and MRA in the past due to hypotension. Will c/w bumex 4 bid and monitor UOP. Add metolazone today, as UOP not at goal and weight inc. JVP remains elevated to angle of jaw. Goal 1-2 L negative. Strict is/os and daily, consider cuellar.  Respiratory status is improving.    #acute on chronic HFrEF  -ECG NSR, pvcs, RA deviation, diffuse TWIs and ST deppressions in precordial leads  -suspect trop is demand in setting of CHF excacerbation  -she is warm at this time - c/w home metoprolol  -bumex 4 IV bid, add metolazone 2.5 bid  -tele  -repeat TTE unchanged  -cards consult appreciated, may need CHF team  -trend LFTs daily  -strict is/os and daily standing weights - consider cuellar for accurate outs    #DM2  -titrate insulin prn    #rash  -diffuse erythematous, raised pruritic papules  ?severe eczema  -start steroid cream, hydroxycine prn, permetherin per derm    Tor Newberry MD  Division of Hospital Medicine  Pager: [phone]  Office: [phone] 75F w/ PMHx of severe ICM (EF 20%), severe MR s/p mitraclip, HTN, HLD, DM2, CKD b/l CR~2 p/w sob. Suspect acute on chronic chf exacerbation. PNA less likely, can dc abx. She has been poorly tolerant of ACEi/ARB and MRA in the past due to hypotension. Will c/w bumex 4 bid and monitor UOP. Add metolazone today, as UOP not at goal and weight inc. JVP remains elevated to angle of jaw. Goal 1-2 L negative. Strict is/os and daily, consider cuellar.  Respiratory status is improving.    #acute on chronic HFrEF  -ECG NSR, pvcs, RA deviation, diffuse TWIs and ST deppressions in precordial leads  -suspect trop is demand in setting of CHF excacerbation  -she is warm at this time - c/w home metoprolol  -bumex 4 IV bid, add metolazone 2.5 bid  -tele  -repeat TTE unchanged  -cards consult appreciated, may need CHF team  -trend LFTs daily  -strict is/os and daily standing weights - consider cuellar for accurate outs    #DM2  -titrate insulin prn    #rash  -diffuse erythematous, raised pruritic papules  ?severe eczema  -start steroid cream, hydroxycine prn, permetherin per derm    #decreased LLE pulses - check vascular doppler.     Tor Newberry MD  Division of Hospital Medicine  Pager: [phone]  Office: [phone]

## 2021-01-15 NOTE — PROGRESS NOTE ADULT - PROBLEM SELECTOR PLAN 2
-Clinical signs of volume overload. CXR shows pulmonary edema and elevated proBNP.   -TTE showed EF of 20% with global LV and RV dysfunction  -C/w metoprolol 12.5 mg BID and Bumex 4 mg BID   -Cardiology consulted for GDMT, recommended dobutamine if Cr keeps rising -Clinical signs of volume overload. CXR shows pulmonary edema and elevated proBNP.   -TTE showed EF of 20% with global LV and RV dysfunction  -C/w metoprolol 12.5 mg BID and Bumex 4 mg BID   -Cardiology consulted for GDMT, recommended dobutamine if Cr keeps rising, CHF team called

## 2021-01-15 NOTE — CONSULT NOTE ADULT - ATTENDING COMMENTS
Favor an eczematous dermatitis based on clinical exams. There are no burrows to indicate a diagnosis of scabies, but I would recommend empiric treatment with permetherin cream as we do not know if she has a history of atopic dermatitis and there is significant involvement of the hands. Anticipate improvement with topical corticosteroids.    Patient seen and exam today at bedside. Chart, labs, vitals, radiology reviewed as applicable. Note reviewed and edited where appropriate.    Agree with history and physical exam. Agree with assessment and plan.      I was physically present for the key portions of the evaluation and management (E/M) service provided. I agree with the above history, physical, and plan which I have reviewed and edited where appropriate.        Fausto Carroll MD, PharmD, MPH  Co-Director, Inpatient Dermatology Consultation Service, [facility]
Briefly, 72 yo F with HTN, HLD, DM2 (A1c 7.5 10/19), CAD s/p CABG (LIMA to LAD, SVG to OM, SVG to PDA) and prior PCI, ICM HFrEF (EF 20-25%, LVEDD 4.7 cm), severe mitral regurgitation s/p mitral clip (6/19; mean MV gradient 7-8), severe pulmonary HTN, CKD IV (b/l Cr 1.9-2.2), hypothyroidism who BIBEMS 2/2 worsening work of breathing presents in acute on chronic systolic CHF with NYHA IV functional status. Was notably on midodrine as outpatient since discharge from [facility] in 10/19. Has been having increased work of breathing at home with failure to thrive. Initially required Bipap and was givne bumex with some improvement. Continues to be dyspneic. On exam, frail appearing, JVP approx 8-10 with HJR with resp variation, RRR, no m/r/g, mild crackles b/l, nontender abomdne, no pedal edema. Labs reviewed - BUN/Cr 60/2 (near baseline), BNP 5k. TTE reviewed -  severe LV dysfunction, LVEDD 4.8 cm, mild-mod MR, IVC normal size.   - c/w bumex 4 mg q12 with metolazone bid  - eventually will switch lopressor to toprol xl  - start hydral 10 mg q8h; would plan on uptitrating as much as possible   - given SBP in 120-140s, no need for inotropic support at the moment

## 2021-01-15 NOTE — PROGRESS NOTE ADULT - SUBJECTIVE AND OBJECTIVE BOX
PROGRESS NOTE: INTERNAL MEDICINE    Contact Information:  Zonia Lee PGY1   Pager: [phone] 7AM-7PM    Patient Name: SELVIN CLAIRE  LOS: 01-12-21 (3d)    Patient is a 75y old  Female who presents with a chief complaint of increased work of breathing (15 Dusty 2021 13:04)      OVERNIGHT/INTERVAL EVENTS: No acute events    SUBJECTIVE: Via [facility]  [facility] [place] [id number]. Seen and examined at bedside. Says she feels weak. Says legs less swollen, itching and pain much better than before.     MEDICATIONS  (STANDING):  aspirin enteric coated 81 milliGRAM(s) Oral daily  atorvastatin 80 milliGRAM(s) Oral at bedtime  buMETAnide IVPB 4 milliGRAM(s) IV Intermittent every 12 hours  calamine/zinc oxide Lotion 1 Application(s) Topical three times a day  clobetasol 0.05% Cream 1 Application(s) Topical every 12 hours  clopidogrel Tablet 75 milliGRAM(s) Oral daily  dextrose 40% Gel 15 Gram(s) Oral once  dextrose 5%. 1000 milliLiter(s) (50 mL/Hr) IV Continuous <Continuous>  dextrose 5%. 1000 milliLiter(s) (100 mL/Hr) IV Continuous <Continuous>  dextrose 50% Injectable 25 Gram(s) IV Push once  dextrose 50% Injectable 12.5 Gram(s) IV Push once  dextrose 50% Injectable 25 Gram(s) IV Push once  glucagon  Injectable 1 milliGRAM(s) IntraMuscular once  heparin   Injectable 5000 Unit(s) SubCutaneous every 8 hours  insulin glargine Injectable (LANTUS) 20 Unit(s) SubCutaneous at bedtime  insulin lispro (ADMELOG) corrective regimen sliding scale   SubCutaneous three times a day before meals  insulin lispro (ADMELOG) corrective regimen sliding scale   SubCutaneous at bedtime  insulin lispro Injectable (ADMELOG) 10 Unit(s) SubCutaneous three times a day before meals  levothyroxine Injectable 25 MICROGram(s) IV Push at bedtime  melatonin 3 milliGRAM(s) Oral at bedtime  metolazone 2.5 milliGRAM(s) Oral <User Schedule>  metoprolol tartrate 12.5 milliGRAM(s) Oral every 12 hours    MEDICATIONS  (PRN):    Allergies  azithromycin (Hives; Pruritus)    PHYSICAL EXAM:    Vital Signs Last 24 Hrs  T(C): 36.5 (15 Dusty 2021 11:27), Max: 36.9 (14 Jan 2021 20:15)  T(F): 97.7 (15 Dusty 2021 11:27), Max: 98.4 (14 Jan 2021 20:15)  HR: 90 (15 Dusty 2021 11:27) (84 - 96)  BP: 122/66 (15 Dusty 2021 11:27) (116/69 - 146/83)  BP(mean): --  RR: 18 (15 Dusty 2021 11:27) (18 - 18)  SpO2: 99% (15 Dusty 2021 11:27) (99% - 100%)    PHYSICAL EXAM  GENERAL: in mild to moderate respiratory distress, AAOX3, speaking in full sentences   HEAD:  Atraumatic, Normocephalic  EYES: Conjunctiva and sclera clear  CHEST/LUNG: Clear to auscultation bilaterally; No wheeze  HEART: Regular rate and rhythm; No murmurs, rubs, or gallops  ABDOMEN: Soft, Nontender, Nondistended; Bowel sounds present  EXTREMITIES:  weak Peripheral Pulses, No clubbing, cyanosis, or edema  PSYCH: AAOx3  SKIN: Diffuse eczematous skin rash      01-14-21 @ 07:01  -  01-15-21 @ 07:00  --------------------------------------------------------  IN: 1040 mL / OUT: 1700 mL / NET: -660 mL    01-15-21 @ 07:01  -  01-15-21 @ 13:35  --------------------------------------------------------  IN: 60 mL / OUT: 0 mL / NET: 60 mL        PATIENT LABORATORY DATA:    CAPILLARY BLOOD GLUCOSE      POCT Blood Glucose.: 125 mg/dL (15 Dusty 2021 11:19)  POCT Blood Glucose.: 234 mg/dL (15 Dusty 2021 07:36)  POCT Blood Glucose.: 384 mg/dL (14 Jan 2021 21:42)  POCT Blood Glucose.: 278 mg/dL (14 Jan 2021 16:31)                          9.5    10.89 )-----------( 320      ( 15 Dusty 2021 07:04 )             32.4     01-15    141  |  107  |  65<H>  ----------------------------<  207<H>  4.0   |  20<L>  |  2.11<H>    Ca    9.4      15 Dusty 2021 07:00  Phos  4.3     01-15  Mg     2.4     01-15    TPro  7.7  /  Alb  3.6  /  TBili  0.6  /  DBili  x   /  AST  35  /  ALT  27  /  AlkPhos  108  01-14    Microbiology:     Culture - Urine (collected 01-13-21 @ 00:31)  Source: .Urine Clean Catch (Midstream)  Final Report (01-13-21 @ 22:08):    <10,000 CFU/mL Normal Urogenital Elizabeth    Culture - Blood (collected 01-12-21 @ 19:54)  Source: .Blood Blood-Venous  Preliminary Report (01-13-21 @ 20:02):    No growth to date.    Culture - Blood (collected 01-12-21 @ 19:54)  Source: .Blood Blood-Peripheral  Preliminary Report (01-13-21 @ 20:01):    No growth to date.    PENDING DIAGNOSTIC WORK UP:  VA Duplex Lower Ext Vein Scan, Bilat: Routine   Indication: r/o DVT  Transport: Stretcher-Crib (01-15-21 @ 12:08)     PROGRESS NOTE: INTERNAL MEDICINE    Contact Information:  Zonia Lee PGY1   Pager: [phone] 7AM-7PM    Patient Name: SELVIN CLAIRE  LOS: 01-12-21 (3d)    Patient is a 75y old  Female who presents with a chief complaint of increased work of breathing (15 Dusty 2021 13:04)      OVERNIGHT/INTERVAL EVENTS: No acute events    SUBJECTIVE: Via [facility]  [facility] [place] [id number]. Seen and examined at bedside. Says she feels weak. Says legs less swollen, itching and pain much better than before.     MEDICATIONS  (STANDING):  aspirin enteric coated 81 milliGRAM(s) Oral daily  atorvastatin 80 milliGRAM(s) Oral at bedtime  buMETAnide IVPB 4 milliGRAM(s) IV Intermittent every 12 hours  calamine/zinc oxide Lotion 1 Application(s) Topical three times a day  clobetasol 0.05% Cream 1 Application(s) Topical every 12 hours  clopidogrel Tablet 75 milliGRAM(s) Oral daily  dextrose 40% Gel 15 Gram(s) Oral once  dextrose 5%. 1000 milliLiter(s) (50 mL/Hr) IV Continuous <Continuous>  dextrose 5%. 1000 milliLiter(s) (100 mL/Hr) IV Continuous <Continuous>  dextrose 50% Injectable 25 Gram(s) IV Push once  dextrose 50% Injectable 12.5 Gram(s) IV Push once  dextrose 50% Injectable 25 Gram(s) IV Push once  glucagon  Injectable 1 milliGRAM(s) IntraMuscular once  heparin   Injectable 5000 Unit(s) SubCutaneous every 8 hours  insulin glargine Injectable (LANTUS) 20 Unit(s) SubCutaneous at bedtime  insulin lispro (ADMELOG) corrective regimen sliding scale   SubCutaneous three times a day before meals  insulin lispro (ADMELOG) corrective regimen sliding scale   SubCutaneous at bedtime  insulin lispro Injectable (ADMELOG) 10 Unit(s) SubCutaneous three times a day before meals  levothyroxine Injectable 25 MICROGram(s) IV Push at bedtime  melatonin 3 milliGRAM(s) Oral at bedtime  metolazone 2.5 milliGRAM(s) Oral <User Schedule>  metoprolol tartrate 12.5 milliGRAM(s) Oral every 12 hours    MEDICATIONS  (PRN):    Allergies  azithromycin (Hives; Pruritus)    PHYSICAL EXAM:    Vital Signs Last 24 Hrs  T(C): 36.5 (15 Dusty 2021 11:27), Max: 36.9 (14 Jan 2021 20:15)  T(F): 97.7 (15 Dusty 2021 11:27), Max: 98.4 (14 Jan 2021 20:15)  HR: 90 (15 Dusty 2021 11:27) (84 - 96)  BP: 122/66 (15 Dusty 2021 11:27) (116/69 - 146/83)  BP(mean): --  RR: 18 (15 Dusty 2021 11:27) (18 - 18)  SpO2: 99% (15 Dusty 2021 11:27) (99% - 100%)    PHYSICAL EXAM  GENERAL: in mild to moderate respiratory distress, AAOX3, speaking in full sentences. 4L NC  HEAD:  Atraumatic, Normocephalic  EYES: Conjunctiva and sclera clear  CHEST/LUNG: Clear to auscultation bilaterally; No wheeze  HEART: Regular rate and rhythm; No murmurs, rubs, or gallops  ABDOMEN: Soft, Nontender, Nondistended; Bowel sounds present  EXTREMITIES:  weak Peripheral Pulses, No clubbing, cyanosis, or edema  PSYCH: AAOx3  SKIN: Diffuse eczematous skin rash      01-14-21 @ 07:01  -  01-15-21 @ 07:00  --------------------------------------------------------  IN: 1040 mL / OUT: 1700 mL / NET: -660 mL    01-15-21 @ 07:01  -  01-15-21 @ 13:35  --------------------------------------------------------  IN: 60 mL / OUT: 0 mL / NET: 60 mL        PATIENT LABORATORY DATA:    CAPILLARY BLOOD GLUCOSE      POCT Blood Glucose.: 125 mg/dL (15 Dusty 2021 11:19)  POCT Blood Glucose.: 234 mg/dL (15 Dusty 2021 07:36)  POCT Blood Glucose.: 384 mg/dL (14 Jan 2021 21:42)  POCT Blood Glucose.: 278 mg/dL (14 Jan 2021 16:31)                          9.5    10.89 )-----------( 320      ( 15 Dusty 2021 07:04 )             32.4     01-15    141  |  107  |  65<H>  ----------------------------<  207<H>  4.0   |  20<L>  |  2.11<H>    Ca    9.4      15 Dusty 2021 07:00  Phos  4.3     01-15  Mg     2.4     01-15    TPro  7.7  /  Alb  3.6  /  TBili  0.6  /  DBili  x   /  AST  35  /  ALT  27  /  AlkPhos  108  01-14    Microbiology:     Culture - Urine (collected 01-13-21 @ 00:31)  Source: .Urine Clean Catch (Midstream)  Final Report (01-13-21 @ 22:08):    <10,000 CFU/mL Normal Urogenital Elizabeth    Culture - Blood (collected 01-12-21 @ 19:54)  Source: .Blood Blood-Venous  Preliminary Report (01-13-21 @ 20:02):    No growth to date.    Culture - Blood (collected 01-12-21 @ 19:54)  Source: .Blood Blood-Peripheral  Preliminary Report (01-13-21 @ 20:01):    No growth to date.    PENDING DIAGNOSTIC WORK UP:  VA Duplex Lower Ext Vein Scan, Bilat: Routine   Indication: r/o DVT  Transport: Stretcher-Crib (01-15-21 @ 12:08)

## 2021-01-16 DIAGNOSIS — N18.4 CHRONIC KIDNEY DISEASE, STAGE 4 (SEVERE): ICD-10-CM

## 2021-01-16 LAB
ALBUMIN SERPL ELPH-MCNC: 3.7 G/DL — SIGNIFICANT CHANGE UP (ref 3.3–5)
ALP SERPL-CCNC: 103 U/L — SIGNIFICANT CHANGE UP (ref 40–120)
ALT FLD-CCNC: 21 U/L — SIGNIFICANT CHANGE UP (ref 10–45)
ANION GAP SERPL CALC-SCNC: 17 MMOL/L — SIGNIFICANT CHANGE UP (ref 5–17)
ANION GAP SERPL CALC-SCNC: 17 MMOL/L — SIGNIFICANT CHANGE UP (ref 5–17)
AST SERPL-CCNC: 21 U/L — SIGNIFICANT CHANGE UP (ref 10–40)
BILIRUB SERPL-MCNC: 0.5 MG/DL — SIGNIFICANT CHANGE UP (ref 0.2–1.2)
BUN SERPL-MCNC: 63 MG/DL — HIGH (ref 7–23)
BUN SERPL-MCNC: 77 MG/DL — HIGH (ref 7–23)
CALCIUM SERPL-MCNC: 9.4 MG/DL — SIGNIFICANT CHANGE UP (ref 8.4–10.5)
CALCIUM SERPL-MCNC: 9.6 MG/DL — SIGNIFICANT CHANGE UP (ref 8.4–10.5)
CHLORIDE SERPL-SCNC: 105 MMOL/L — SIGNIFICANT CHANGE UP (ref 96–108)
CHLORIDE SERPL-SCNC: 99 MMOL/L — SIGNIFICANT CHANGE UP (ref 96–108)
CO2 SERPL-SCNC: 19 MMOL/L — LOW (ref 22–31)
CO2 SERPL-SCNC: 21 MMOL/L — LOW (ref 22–31)
CREAT SERPL-MCNC: 1.87 MG/DL — HIGH (ref 0.5–1.3)
CREAT SERPL-MCNC: 2.07 MG/DL — HIGH (ref 0.5–1.3)
GLUCOSE BLDC GLUCOMTR-MCNC: 162 MG/DL — HIGH (ref 70–99)
GLUCOSE BLDC GLUCOMTR-MCNC: 164 MG/DL — HIGH (ref 70–99)
GLUCOSE BLDC GLUCOMTR-MCNC: 303 MG/DL — HIGH (ref 70–99)
GLUCOSE BLDC GLUCOMTR-MCNC: 328 MG/DL — HIGH (ref 70–99)
GLUCOSE BLDC GLUCOMTR-MCNC: 343 MG/DL — HIGH (ref 70–99)
GLUCOSE BLDC GLUCOMTR-MCNC: 417 MG/DL — HIGH (ref 70–99)
GLUCOSE BLDC GLUCOMTR-MCNC: 420 MG/DL — HIGH (ref 70–99)
GLUCOSE SERPL-MCNC: 170 MG/DL — HIGH (ref 70–99)
GLUCOSE SERPL-MCNC: 355 MG/DL — HIGH (ref 70–99)
HCT VFR BLD CALC: 30.3 % — LOW (ref 34.5–45)
HGB BLD-MCNC: 9.1 G/DL — LOW (ref 11.5–15.5)
MAGNESIUM SERPL-MCNC: 2.3 MG/DL — SIGNIFICANT CHANGE UP (ref 1.6–2.6)
MCHC RBC-ENTMCNC: 26.8 PG — LOW (ref 27–34)
MCHC RBC-ENTMCNC: 30 GM/DL — LOW (ref 32–36)
MCV RBC AUTO: 89.1 FL — SIGNIFICANT CHANGE UP (ref 80–100)
NRBC # BLD: 0 /100 WBCS — SIGNIFICANT CHANGE UP (ref 0–0)
PHOSPHATE SERPL-MCNC: 4 MG/DL — SIGNIFICANT CHANGE UP (ref 2.5–4.5)
PLATELET # BLD AUTO: 322 K/UL — SIGNIFICANT CHANGE UP (ref 150–400)
POTASSIUM SERPL-MCNC: 3.5 MMOL/L — SIGNIFICANT CHANGE UP (ref 3.5–5.3)
POTASSIUM SERPL-MCNC: 4.2 MMOL/L — SIGNIFICANT CHANGE UP (ref 3.5–5.3)
POTASSIUM SERPL-SCNC: 3.5 MMOL/L — SIGNIFICANT CHANGE UP (ref 3.5–5.3)
POTASSIUM SERPL-SCNC: 4.2 MMOL/L — SIGNIFICANT CHANGE UP (ref 3.5–5.3)
PROT SERPL-MCNC: 8 G/DL — SIGNIFICANT CHANGE UP (ref 6–8.3)
RBC # BLD: 3.4 M/UL — LOW (ref 3.8–5.2)
RBC # FLD: 17.6 % — HIGH (ref 10.3–14.5)
SODIUM SERPL-SCNC: 137 MMOL/L — SIGNIFICANT CHANGE UP (ref 135–145)
SODIUM SERPL-SCNC: 141 MMOL/L — SIGNIFICANT CHANGE UP (ref 135–145)
WBC # BLD: 10.32 K/UL — SIGNIFICANT CHANGE UP (ref 3.8–10.5)
WBC # FLD AUTO: 10.32 K/UL — SIGNIFICANT CHANGE UP (ref 3.8–10.5)

## 2021-01-16 PROCEDURE — 99233 SBSQ HOSP IP/OBS HIGH 50: CPT

## 2021-01-16 PROCEDURE — 99233 SBSQ HOSP IP/OBS HIGH 50: CPT | Mod: GC

## 2021-01-16 RX ORDER — INSULIN LISPRO 100/ML
5 VIAL (ML) SUBCUTANEOUS ONCE
Refills: 0 | Status: COMPLETED | OUTPATIENT
Start: 2021-01-16 | End: 2021-01-16

## 2021-01-16 RX ORDER — INSULIN LISPRO 100/ML
15 VIAL (ML) SUBCUTANEOUS
Refills: 0 | Status: DISCONTINUED | OUTPATIENT
Start: 2021-01-16 | End: 2021-01-17

## 2021-01-16 RX ORDER — HYDRALAZINE HCL 50 MG
25 TABLET ORAL EVERY 8 HOURS
Refills: 0 | Status: DISCONTINUED | OUTPATIENT
Start: 2021-01-16 | End: 2021-01-20

## 2021-01-16 RX ORDER — POTASSIUM CHLORIDE 20 MEQ
40 PACKET (EA) ORAL
Refills: 0 | Status: COMPLETED | OUTPATIENT
Start: 2021-01-16 | End: 2021-01-16

## 2021-01-16 RX ORDER — FUROSEMIDE 40 MG
40 TABLET ORAL ONCE
Refills: 0 | Status: COMPLETED | OUTPATIENT
Start: 2021-01-16 | End: 2021-01-16

## 2021-01-16 RX ORDER — INSULIN GLARGINE 100 [IU]/ML
30 INJECTION, SOLUTION SUBCUTANEOUS AT BEDTIME
Refills: 0 | Status: DISCONTINUED | OUTPATIENT
Start: 2021-01-16 | End: 2021-01-17

## 2021-01-16 RX ADMIN — Medication 10 UNIT(S): at 11:42

## 2021-01-16 RX ADMIN — Medication 12.5 MILLIGRAM(S): at 05:03

## 2021-01-16 RX ADMIN — CALAMINE AND ZINC OXIDE AND PHENOL 1 APPLICATION(S): 160; 10 LOTION TOPICAL at 05:04

## 2021-01-16 RX ADMIN — CALAMINE AND ZINC OXIDE AND PHENOL 1 APPLICATION(S): 160; 10 LOTION TOPICAL at 21:19

## 2021-01-16 RX ADMIN — Medication 10 UNIT(S): at 07:53

## 2021-01-16 RX ADMIN — Medication 1 APPLICATION(S): at 16:58

## 2021-01-16 RX ADMIN — Medication 40 MILLIEQUIVALENT(S): at 12:55

## 2021-01-16 RX ADMIN — BUMETANIDE 132 MILLIGRAM(S): 0.25 INJECTION INTRAMUSCULAR; INTRAVENOUS at 17:42

## 2021-01-16 RX ADMIN — Medication 25 MICROGRAM(S): at 21:19

## 2021-01-16 RX ADMIN — Medication 1: at 11:42

## 2021-01-16 RX ADMIN — HEPARIN SODIUM 5000 UNIT(S): 5000 INJECTION INTRAVENOUS; SUBCUTANEOUS at 05:03

## 2021-01-16 RX ADMIN — Medication 40 MILLIEQUIVALENT(S): at 14:40

## 2021-01-16 RX ADMIN — Medication 25 MILLIGRAM(S): at 21:18

## 2021-01-16 RX ADMIN — Medication 12.5 MILLIGRAM(S): at 16:58

## 2021-01-16 RX ADMIN — Medication 1: at 07:54

## 2021-01-16 RX ADMIN — Medication 10 MILLIGRAM(S): at 05:03

## 2021-01-16 RX ADMIN — Medication 3 MILLIGRAM(S): at 21:18

## 2021-01-16 RX ADMIN — Medication 6: at 16:40

## 2021-01-16 RX ADMIN — CLOPIDOGREL BISULFATE 75 MILLIGRAM(S): 75 TABLET, FILM COATED ORAL at 11:42

## 2021-01-16 RX ADMIN — Medication 5 UNIT(S): at 19:37

## 2021-01-16 RX ADMIN — Medication 25 MILLIGRAM(S): at 14:22

## 2021-01-16 RX ADMIN — Medication 40 MILLIGRAM(S): at 23:09

## 2021-01-16 RX ADMIN — ATORVASTATIN CALCIUM 80 MILLIGRAM(S): 80 TABLET, FILM COATED ORAL at 21:18

## 2021-01-16 RX ADMIN — HEPARIN SODIUM 5000 UNIT(S): 5000 INJECTION INTRAVENOUS; SUBCUTANEOUS at 21:17

## 2021-01-16 RX ADMIN — CALAMINE AND ZINC OXIDE AND PHENOL 1 APPLICATION(S): 160; 10 LOTION TOPICAL at 13:11

## 2021-01-16 RX ADMIN — Medication 1 APPLICATION(S): at 05:04

## 2021-01-16 RX ADMIN — Medication 81 MILLIGRAM(S): at 11:42

## 2021-01-16 RX ADMIN — INSULIN GLARGINE 30 UNIT(S): 100 INJECTION, SOLUTION SUBCUTANEOUS at 21:17

## 2021-01-16 RX ADMIN — Medication 2: at 21:17

## 2021-01-16 RX ADMIN — BUMETANIDE 132 MILLIGRAM(S): 0.25 INJECTION INTRAMUSCULAR; INTRAVENOUS at 05:35

## 2021-01-16 RX ADMIN — HEPARIN SODIUM 5000 UNIT(S): 5000 INJECTION INTRAVENOUS; SUBCUTANEOUS at 13:10

## 2021-01-16 RX ADMIN — Medication 10 UNIT(S): at 16:40

## 2021-01-16 NOTE — PROGRESS NOTE ADULT - PROBLEM SELECTOR PLAN 5
-Takes 60 units of lantus at bedtime and 18 units of humalog   -Will dose lantus based on current FS   -C/w 10 units admelog premeal -Takes 60 units of lantus at bedtime and 18 units of humalog   -C/w lantus 25 units of lantus at bedtime   -C/w 10 units admelog premeal

## 2021-01-16 NOTE — PHYSICAL THERAPY INITIAL EVALUATION ADULT - ADDITIONAL COMMENTS
Pt lives with dtr in a private home with 3 steps to enter, required 1 person to negotiate PTA. Pts dtr is HHA. Pt owns RW and cane for amb.

## 2021-01-16 NOTE — PROGRESS NOTE ADULT - SUBJECTIVE AND OBJECTIVE BOX
Patient is a 75y old  Female who presents with a chief complaint of increased work of breathing (16 Jan 2021 08:07)      SUBJECTIVE / OVERNIGHT EVENTS:          MEDICATIONS  (STANDING):  aspirin enteric coated 81 milliGRAM(s) Oral daily  atorvastatin 80 milliGRAM(s) Oral at bedtime  buMETAnide IVPB 4 milliGRAM(s) IV Intermittent every 12 hours  calamine/zinc oxide Lotion 1 Application(s) Topical three times a day  clobetasol 0.05% Cream 1 Application(s) Topical every 12 hours  clopidogrel Tablet 75 milliGRAM(s) Oral daily  dextrose 40% Gel 15 Gram(s) Oral once  dextrose 5%. 1000 milliLiter(s) (50 mL/Hr) IV Continuous <Continuous>  dextrose 5%. 1000 milliLiter(s) (100 mL/Hr) IV Continuous <Continuous>  dextrose 50% Injectable 25 Gram(s) IV Push once  dextrose 50% Injectable 12.5 Gram(s) IV Push once  dextrose 50% Injectable 25 Gram(s) IV Push once  glucagon  Injectable 1 milliGRAM(s) IntraMuscular once  heparin   Injectable 5000 Unit(s) SubCutaneous every 8 hours  hydrALAZINE 10 milliGRAM(s) Oral three times a day  insulin glargine Injectable (LANTUS) 25 Unit(s) SubCutaneous at bedtime  insulin lispro (ADMELOG) corrective regimen sliding scale   SubCutaneous at bedtime  insulin lispro (ADMELOG) corrective regimen sliding scale   SubCutaneous three times a day before meals  insulin lispro Injectable (ADMELOG) 10 Unit(s) SubCutaneous three times a day before meals  levothyroxine Injectable 25 MICROGram(s) IV Push at bedtime  melatonin 3 milliGRAM(s) Oral at bedtime  metolazone 2.5 milliGRAM(s) Oral <User Schedule>  metoprolol tartrate 12.5 milliGRAM(s) Oral every 12 hours    MEDICATIONS  (PRN):      Vital Signs Last 24 Hrs  T(C): 37 (16 Jan 2021 04:19), Max: 37 (16 Jan 2021 04:19)  T(F): 98.6 (16 Jan 2021 04:19), Max: 98.6 (16 Jan 2021 04:19)  HR: 79 (16 Jan 2021 06:19) (79 - 111)  BP: 134/78 (16 Jan 2021 04:19) (122/66 - 156/69)  BP(mean): --  RR: 18 (16 Jan 2021 04:19) (18 - 20)  SpO2: 99% (16 Jan 2021 06:19) (98% - 100%)      PHYSICAL EXAM  GENERAL: NAD, well-developed  HEAD:  Atraumatic, Normocephalic  EYES: EOMI, PERRLA, conjunctiva and sclera clear  NECK: Supple, No JVD  CHEST/LUNG: Clear to auscultation bilaterally; No wheeze  HEART: Regular rate and rhythm; No murmurs, rubs, or gallops  ABDOMEN: Soft, Nontender, Nondistended; Bowel sounds present  EXTREMITIES:  2+ Peripheral Pulses, No clubbing, cyanosis, or edema  PSYCH: AAOx3  SKIN: No rashes or lesions    CAPILLARY BLOOD GLUCOSE      POCT Blood Glucose.: 162 mg/dL (16 Jan 2021 07:47)  POCT Blood Glucose.: 264 mg/dL (15 Dusty 2021 21:11)  POCT Blood Glucose.: 243 mg/dL (15 Dusty 2021 16:41)  POCT Blood Glucose.: 125 mg/dL (15 Dusty 2021 11:19)    I&O's Summary    15 Dusty 2021 07:01  -  16 Jan 2021 07:00  --------------------------------------------------------  IN: 790 mL / OUT: 1860 mL / NET: -1070 mL        LABS:                        9.1    10.32 )-----------( 322      ( 16 Jan 2021 06:40 )             30.3     01-16    141  |  105  |  63<H>  ----------------------------<  170<H>  3.5   |  19<L>  |  1.87<H>    Ca    9.4      16 Jan 2021 06:40  Phos  4.0     01-16  Mg     2.3     01-16                RADIOLOGY & ADDITIONAL TESTS:     MICROBIOLOGY:    ANTIMICROBIALS:    CONSULTS: Patient is a 75y old  Female who presents with a chief complaint of increased work of breathing (16 Jan 2021 08:07)      SUBJECTIVE / OVERNIGHT EVENTS:    No acute events overnight. Patient reports that she has been feeling better and her SOB has improved from admission. However, she still has SOB with exertion and at rest. Otherwise, she denies any fever, chills, nausea, vomiting, abdominal pain or chest pain.       MEDICATIONS  (STANDING):  aspirin enteric coated 81 milliGRAM(s) Oral daily  atorvastatin 80 milliGRAM(s) Oral at bedtime  buMETAnide IVPB 4 milliGRAM(s) IV Intermittent every 12 hours  calamine/zinc oxide Lotion 1 Application(s) Topical three times a day  clobetasol 0.05% Cream 1 Application(s) Topical every 12 hours  clopidogrel Tablet 75 milliGRAM(s) Oral daily  dextrose 40% Gel 15 Gram(s) Oral once  dextrose 5%. 1000 milliLiter(s) (50 mL/Hr) IV Continuous <Continuous>  dextrose 5%. 1000 milliLiter(s) (100 mL/Hr) IV Continuous <Continuous>  dextrose 50% Injectable 25 Gram(s) IV Push once  dextrose 50% Injectable 12.5 Gram(s) IV Push once  dextrose 50% Injectable 25 Gram(s) IV Push once  glucagon  Injectable 1 milliGRAM(s) IntraMuscular once  heparin   Injectable 5000 Unit(s) SubCutaneous every 8 hours  hydrALAZINE 10 milliGRAM(s) Oral three times a day  insulin glargine Injectable (LANTUS) 25 Unit(s) SubCutaneous at bedtime  insulin lispro (ADMELOG) corrective regimen sliding scale   SubCutaneous at bedtime  insulin lispro (ADMELOG) corrective regimen sliding scale   SubCutaneous three times a day before meals  insulin lispro Injectable (ADMELOG) 10 Unit(s) SubCutaneous three times a day before meals  levothyroxine Injectable 25 MICROGram(s) IV Push at bedtime  melatonin 3 milliGRAM(s) Oral at bedtime  metolazone 2.5 milliGRAM(s) Oral <User Schedule>  metoprolol tartrate 12.5 milliGRAM(s) Oral every 12 hours    MEDICATIONS  (PRN):      Vital Signs Last 24 Hrs  T(C): 37 (16 Jan 2021 04:19), Max: 37 (16 Jan 2021 04:19)  T(F): 98.6 (16 Jan 2021 04:19), Max: 98.6 (16 Jan 2021 04:19)  HR: 79 (16 Jan 2021 06:19) (79 - 111)  BP: 134/78 (16 Jan 2021 04:19) (122/66 - 156/69)  BP(mean): --  RR: 18 (16 Jan 2021 04:19) (18 - 20)  SpO2: 99% (16 Jan 2021 06:19) (98% - 100%)      PHYSICAL EXAM  GENERAL: NAD, well-developed  HEAD:  Atraumatic, Normocephalic  EYES: Conjunctiva and sclera clear  CHEST/LUNG: Bibasilar crackles, no wheezing   HEART: Regular rate and rhythm; No murmurs, rubs, or gallops  ABDOMEN: Soft, Nontender, Nondistended; Bowel sounds present  EXTREMITIES:  1+trace LE edema.   PSYCH: AAOx3  SKIN: No rashes or lesions    CAPILLARY BLOOD GLUCOSE      POCT Blood Glucose.: 162 mg/dL (16 Jan 2021 07:47)  POCT Blood Glucose.: 264 mg/dL (15 Dusty 2021 21:11)  POCT Blood Glucose.: 243 mg/dL (15 Dusty 2021 16:41)  POCT Blood Glucose.: 125 mg/dL (15 Dusty 2021 11:19)    I&O's Summary    15 Dusty 2021 07:01  -  16 Jan 2021 07:00  --------------------------------------------------------  IN: 790 mL / OUT: 1860 mL / NET: -1070 mL        LABS:                        9.1    10.32 )-----------( 322      ( 16 Jan 2021 06:40 )             30.3     01-16    141  |  105  |  63<H>  ----------------------------<  170<H>  3.5   |  19<L>  |  1.87<H>    Ca    9.4      16 Jan 2021 06:40  Phos  4.0     01-16  Mg     2.3     01-16

## 2021-01-16 NOTE — PROGRESS NOTE ADULT - ASSESSMENT
75y.o F Japanese speaking h/o HTN, HLD, DM, CAD s/p CABG 2014 and prior PCI, severe mitral regurgitation (s/p mitral clip 2019), pulmonary HTN (TTE 2019), HF (EF 21 % June 2019), CKD IV not on dialysis (b/l SCr 2-2.2), hypothyroidism BIBEMS 2/2 worsening work of breathing, found to have crackles, elevated proBNP and b/l pulmonary edema on CXR c/w acute exacerbation of CHF.

## 2021-01-16 NOTE — PROGRESS NOTE ADULT - SUBJECTIVE AND OBJECTIVE BOX
[facility]   MD RAHEEL SHAH MD RUORU WONG, PA    TEL:  OFFICE: [phone]  DR SANTOYO CELL: [phone]  JUSTEN KENDALL CELL: [phone]  DR. NGUYEN CELL: [phone]  DR. RIDER CELL: [phone]    FROM 5 PM - 7 AM PLEASE CALL ANSWERING SERVICE: [phone]    RENAL FOLLOW UP NOTE--Date of Service 01-16-21 @ 08:08  --------------------------------------------------------------------------------  HPI:      Pt seen and examined at bedside.       PAST HISTORY  --------------------------------------------------------------------------------  No significant changes to PMH, PSH, FHx, SHx, unless otherwise noted    ALLERGIES & MEDICATIONS  --------------------------------------------------------------------------------  Allergies    azithromycin (Hives; Pruritus)    Intolerances      Standing Inpatient Medications  aspirin enteric coated 81 milliGRAM(s) Oral daily  atorvastatin 80 milliGRAM(s) Oral at bedtime  buMETAnide IVPB 4 milliGRAM(s) IV Intermittent every 12 hours  calamine/zinc oxide Lotion 1 Application(s) Topical three times a day  clobetasol 0.05% Cream 1 Application(s) Topical every 12 hours  clopidogrel Tablet 75 milliGRAM(s) Oral daily  dextrose 40% Gel 15 Gram(s) Oral once  dextrose 5%. 1000 milliLiter(s) IV Continuous <Continuous>  dextrose 5%. 1000 milliLiter(s) IV Continuous <Continuous>  dextrose 50% Injectable 25 Gram(s) IV Push once  dextrose 50% Injectable 12.5 Gram(s) IV Push once  dextrose 50% Injectable 25 Gram(s) IV Push once  glucagon  Injectable 1 milliGRAM(s) IntraMuscular once  heparin   Injectable 5000 Unit(s) SubCutaneous every 8 hours  hydrALAZINE 10 milliGRAM(s) Oral three times a day  insulin glargine Injectable (LANTUS) 25 Unit(s) SubCutaneous at bedtime  insulin lispro (ADMELOG) corrective regimen sliding scale   SubCutaneous at bedtime  insulin lispro (ADMELOG) corrective regimen sliding scale   SubCutaneous three times a day before meals  insulin lispro Injectable (ADMELOG) 10 Unit(s) SubCutaneous three times a day before meals  levothyroxine Injectable 25 MICROGram(s) IV Push at bedtime  melatonin 3 milliGRAM(s) Oral at bedtime  metolazone 2.5 milliGRAM(s) Oral <User Schedule>  metoprolol tartrate 12.5 milliGRAM(s) Oral every 12 hours    PRN Inpatient Medications      REVIEW OF SYSTEMS  --------------------------------------------------------------------------------  General: no fever  CVS: no chest pain  RESP: intermittent sob, no cough  ABD: no abdominal pain  : no dysuria,  MSK: trace  edema     VITALS/PHYSICAL EXAM  --------------------------------------------------------------------------------  T(C): 37 (01-16-21 @ 04:19), Max: 37 (01-16-21 @ 04:19)  HR: 79 (01-16-21 @ 06:19) (79 - 111)  BP: 134/78 (01-16-21 @ 04:19) (122/66 - 156/69)  RR: 18 (01-16-21 @ 04:19) (18 - 20)  SpO2: 99% (01-16-21 @ 06:19) (98% - 100%)  Wt(kg): --        01-15-21 @ 07:01  -  01-16-21 @ 07:00  --------------------------------------------------------  IN: 790 mL / OUT: 1860 mL / NET: -1070 mL      Physical Exam:  	Gen: NAD  	HEENT: MMM  	Pulm: Coarse breath sounds   B/L  	CV: S1S2  	Abd: Soft, +BS  	Ext: trace LE edema B/L                      Neuro: Awake   	Skin: Warm and Dry   	Vascular access: no HD catheter           no  cuellar  LABS/STUDIES  --------------------------------------------------------------------------------              9.1    10.32 >-----------<  322      [01-16-21 @ 06:40]              30.3     141  |  105  |  63  ----------------------------<  170      [01-16-21 @ 06:40]  3.5   |  19  |  1.87        Ca     9.4     [01-16-21 @ 06:40]      Mg     2.3     [01-16-21 @ 06:40]      Phos  4.0     [01-16-21 @ 06:40]            Creatinine Trend:  SCr 1.87 [01-16 @ 06:40]  SCr 2.11 [01-15 @ 07:00]  SCr 1.94 [01-14 @ 07:06]  SCr 2.07 [01-13 @ 05:14]  SCr 2.15 [01-12 @ 17:42]    Urinalysis - [01-12-21 @ 19:28]      Color Light Yellow / Appearance Clear / SG 1.011 / pH 6.0      Gluc Negative / Ketone Negative  / Bili Negative / Urobili Negative       Blood Negative / Protein 30 mg/dL / Leuk Est Moderate / Nitrite Negative      RBC 1 / WBC 5 / Hyaline 4 / Gran  / Sq Epi  / Non Sq Epi 1 / Bacteria Negative      Ferritin 111      [01-13-21 @ 01:42]  HbA1c 7.5      [10-08-19 @ 14:30]  TSH 2.15      [01-13-21 @ 10:07]

## 2021-01-16 NOTE — PROGRESS NOTE ADULT - PROBLEM SELECTOR PLAN 2
-Clinical signs of volume overload. CXR shows pulmonary edema and elevated proBNP.   -TTE showed EF of 20% with global LV and RV dysfunction  -C/w metoprolol 12.5 mg BID and Bumex 4 mg BID   -Cardiology consulted for GDMT, recommended dobutamine if Cr keeps rising, CHF team called

## 2021-01-16 NOTE — PROGRESS NOTE ADULT - ATTENDING COMMENTS
Patient seen and examined, agree with above assessment and plan as transcribed above.    - cont Bumex and metolazone  - Tolerating afterload reducers  - may need     Vargas Adame MD, [facility]  BEEPER [phone]

## 2021-01-16 NOTE — PHYSICAL THERAPY INITIAL EVALUATION ADULT - PRECAUTIONS/LIMITATIONS, REHAB EVAL
[facility] physiology BLE 1/15: No Doppler evidence of hemodynamically significant arterial inflow limitation in either lower extremity. Evidence of small vessel disease in the feet. US BLE 1/13: (-) DVT. CT chest 1/13: Small bilateral pleural effusions. Mosaic attenuation due in part to expiration phase of imaging. No definite superimposed opacity.  Partial atelectasis of the right lower lobe. [facility] physiology BLE 1/15: No Doppler evidence of hemodynamically significant arterial inflow limitation in either lower extremity. Evidence of small vessel disease in the feet. US BLE 1/13: (-) DVT. CT chest 1/13: Small bilateral pleural effusions. Mosaic attenuation due in part to expiration phase of imaging. No definite superimposed opacity.  Partial atelectasis of the right lower lobe./fall precautions

## 2021-01-16 NOTE — PHYSICAL THERAPY INITIAL EVALUATION ADULT - PERTINENT HX OF CURRENT PROBLEM, REHAB EVAL
Pt is a 74 y/o female with PMH of HTN, HLD, DM, CAD s/p CABG 2014 and prior PCI, severe mitral regurgitation (s/p mitral clip 2019), pulmonary HTN (TTE 2019), HF (EF 21 % June 2019), CKD IV not on dialysis (b/l SCr 2-2.2), hypothyroidism BIBEMS 2/2 worsening work of breathing, found to have crackles, elevated proBNP and b/l pulmonary edema on CXR c/w acute exacerbation of CHF.

## 2021-01-16 NOTE — PROGRESS NOTE ADULT - PROBLEM SELECTOR PLAN 1
increase hydral to 25 mg q8h  c/w lopressor for now; eventual switch to toprol  c/w bumex 4 mg q12; d/c metolazone for now

## 2021-01-16 NOTE — PROGRESS NOTE ADULT - SUBJECTIVE AND OBJECTIVE BOX
Interval History:  reports diff breathing     Medications:  aspirin enteric coated 81 milliGRAM(s) Oral daily  atorvastatin 80 milliGRAM(s) Oral at bedtime  buMETAnide IVPB 4 milliGRAM(s) IV Intermittent every 12 hours  calamine/zinc oxide Lotion 1 Application(s) Topical three times a day  clobetasol 0.05% Cream 1 Application(s) Topical every 12 hours  clopidogrel Tablet 75 milliGRAM(s) Oral daily  dextrose 40% Gel 15 Gram(s) Oral once  dextrose 5%. 1000 milliLiter(s) IV Continuous <Continuous>  dextrose 5%. 1000 milliLiter(s) IV Continuous <Continuous>  dextrose 50% Injectable 25 Gram(s) IV Push once  dextrose 50% Injectable 12.5 Gram(s) IV Push once  dextrose 50% Injectable 25 Gram(s) IV Push once  glucagon  Injectable 1 milliGRAM(s) IntraMuscular once  heparin   Injectable 5000 Unit(s) SubCutaneous every 8 hours  hydrALAZINE 25 milliGRAM(s) Oral every 8 hours  insulin glargine Injectable (LANTUS) 30 Unit(s) SubCutaneous at bedtime  insulin lispro (ADMELOG) corrective regimen sliding scale   SubCutaneous three times a day before meals  insulin lispro (ADMELOG) corrective regimen sliding scale   SubCutaneous at bedtime  insulin lispro Injectable (ADMELOG) 15 Unit(s) SubCutaneous three times a day before meals  levothyroxine Injectable 25 MICROGram(s) IV Push at bedtime  melatonin 3 milliGRAM(s) Oral at bedtime  metoprolol tartrate 12.5 milliGRAM(s) Oral every 12 hours      Vitals:  T(C): 37.3 (21 @ 20:28), Max: 37.3 (21 @ 20:28)  HR: 95 (21 @ 20:28) (79 - 95)  BP: 144/78 (21 @ 20:28) (109/68 - 144/78)  BP(mean): --  RR: 19 (21 @ 20:28) (18 - 19)  SpO2: 100% (21 @ 20:28) (99% - 100%)    Daily     Daily Weight in k.5 (2021 10:00)        I&O's Summary    15 Dusty 2021 07:01  -  2021 07:00  --------------------------------------------------------  IN: 790 mL / OUT: 1860 mL / NET: -1070 mL    2021 07:01  -  2021 21:34  --------------------------------------------------------  IN: 350 mL / OUT: 800 mL / NET: -450 mL        Physical Exam:  Appearance: No Acute Distress  HEENT: PERRL  Neck: JVD approx 8-10 with mild HJR  Cardiovascular: Normal S1 S2, No murmurs/rubs/gallops  Respiratory: Clear to auscultation bilaterally  Gastrointestinal: Soft, Non-tender	  Skin: No cyanosis	  Neurologic: Non-focal  Extremities: No LE edema  Psychiatry: A & O x 3, Mood & affect appropriate    Labs:                        9.1    10.32 )-----------( 322      ( 2021 06:40 )             30.3         137  |  99  |  77<H>  ----------------------------<  355<H>  4.2   |  21<L>  |  2.07<H>    Ca    9.6      2021 17:41  Phos  4.0       Mg     2.3         TPro  8.0  /  Alb  3.7  /  TBili  0.5  /  DBili  x   /  AST  21  /  ALT  21  /  AlkPhos  103

## 2021-01-16 NOTE — PROGRESS NOTE ADULT - ATTENDING COMMENTS
Acute on chronic systolic heart failure- cont Bumex 4 mg IV BID with metolazone- may require inotrope/diuretic drip

## 2021-01-16 NOTE — PROGRESS NOTE ADULT - SUBJECTIVE AND OBJECTIVE BOX
S: Unchanged SOB on 4L NC but complaining of b/l foot pain more severe on left side. Denies chest pain. Review of systems otherwise (-)    Review of Systems:   Constitutional: [ ] fevers, [ ] chills.   Skin: [ ] dry skin. [ ] rashes.  Psychiatric: [ ] depression, [ ] anxiety.   Gastrointestinal: [ ] BRBPR, [ ] melena.   Neurological: [ ] confusion. [ ] seizures. [ ] shuffling gait.   Ears,Nose,Mouth and Throat: [ ] ear pain [ ] sore throat.   Eyes: [ ] diplopia.   Respiratory: [ ] hemoptysis. [ ] shortness of breath  Cardiovascular: See HPI above  Hematologic/Lymphatic: [ ] anemia. [ ] painful nodes. [ ] prolonged bleeding.   Genitourinary: [ ] hematuria. [ ] flank pain.   Endocrine: [ ] significant change in weight. [ ] intolerance to heat and cold.     Review of systems [x ] otherwise negative, [ ] otherwise unable to obtain    FH: no family history of sudden cardiac death in first degree relatives    SH: [ ] tobacco, [ ] alcohol, [ ] drugs         aspirin enteric coated 81 milliGRAM(s) Oral daily  atorvastatin 80 milliGRAM(s) Oral at bedtime  buMETAnide IVPB 4 milliGRAM(s) IV Intermittent every 12 hours  calamine/zinc oxide Lotion 1 Application(s) Topical three times a day  clobetasol 0.05% Cream 1 Application(s) Topical every 12 hours  clopidogrel Tablet 75 milliGRAM(s) Oral daily  dextrose 40% Gel 15 Gram(s) Oral once  dextrose 5%. 1000 milliLiter(s) IV Continuous <Continuous>  dextrose 5%. 1000 milliLiter(s) IV Continuous <Continuous>  dextrose 50% Injectable 25 Gram(s) IV Push once  dextrose 50% Injectable 12.5 Gram(s) IV Push once  dextrose 50% Injectable 25 Gram(s) IV Push once  glucagon  Injectable 1 milliGRAM(s) IntraMuscular once  heparin   Injectable 5000 Unit(s) SubCutaneous every 8 hours  hydrALAZINE 10 milliGRAM(s) Oral three times a day  insulin glargine Injectable (LANTUS) 25 Unit(s) SubCutaneous at bedtime  insulin lispro (ADMELOG) corrective regimen sliding scale   SubCutaneous at bedtime  insulin lispro (ADMELOG) corrective regimen sliding scale   SubCutaneous three times a day before meals  insulin lispro Injectable (ADMELOG) 10 Unit(s) SubCutaneous three times a day before meals  levothyroxine Injectable 25 MICROGram(s) IV Push at bedtime  melatonin 3 milliGRAM(s) Oral at bedtime  metolazone 2.5 milliGRAM(s) Oral <User Schedule>  metoprolol tartrate 12.5 milliGRAM(s) Oral every 12 hours                            9.1    10.32 )-----------( 322      ( 16 Jan 2021 06:40 )             30.3       Hemoglobin: 9.1 g/dL (01-16 @ 06:40)  Hemoglobin: 9.5 g/dL (01-15 @ 07:04)  Hemoglobin: 9.8 g/dL (01-14 @ 07:06)  Hemoglobin: 10.3 g/dL (01-13 @ 05:14)  Hemoglobin: 11.0 g/dL (01-12 @ 17:42)      01-16    141  |  105  |  63<H>  ----------------------------<  170<H>  3.5   |  19<L>  |  1.87<H>    Ca    9.4      16 Jan 2021 06:40  Phos  4.0     01-16  Mg     2.3     01-16      Creatinine Trend: 1.87<--, 2.11<--, 1.94<--, 2.07<--, 2.15<--    COAGS:           T(C): 37 (01-16-21 @ 04:19), Max: 37 (01-16-21 @ 04:19)  HR: 90 (01-16-21 @ 09:21) (79 - 111)  BP: 134/78 (01-16-21 @ 04:19) (122/66 - 156/69)  RR: 18 (01-16-21 @ 04:19) (18 - 20)  SpO2: 99% (01-16-21 @ 09:21) (98% - 100%)  Wt(kg): --    I&O's Summary    15 Dusty 2021 07:01  -  16 Jan 2021 07:00  --------------------------------------------------------  IN: 790 mL / OUT: 1860 mL / NET: -1070 mL      General: Well nourished in no acute distress. Alert and Oriented * 3.   Head: Normocephalic and atraumatic.   Neck: No JVD. No bruits. Supple. Does not appear to be enlarged.   Cardiovascular: + S1,S2 ; RRR Soft systolic murmur at the left lower sternal border. No rubs noted.    Lungs: Decrease BS at bases b/l  Abdomen: + BS, soft. Non tender. Non distended. No rebound. No guarding.   Extremities: No clubbing/cyanosis/edema. Difficulty palpating DP/PT pulses, found on doppler but appears weakened at Left DP  Neurologic: Moves all four extremities. Full range of motion.   Skin: Warm and moist. The patient's skin has normal elasticity and good skin turgor.   Psychiatric: Appropriate mood and affect.  Musculoskeletal: Normal range of motion, normal strength    TELEMETRY: NSR 80-90  ECG:  NSR,. iRBBB.  Echo: 2019 21% EF, severe PHTN, normal LA size, s/p MitraClip  NST: 2018. Prior LCx territory MI.      < from: TTE with Doppler (w/Cont) (01.13.21 @ 08:31) >  Conclusions:  1. Tethered mitral leaflets. Patient status-post mitral  clip placement. Mild-moderate mitral regurgitation. Mean  transmitral valve gradient equals 7-8 mm Hg. (HRabout 80s  bpm)  2. Aortic valve not well visualized; appears to be a  calcified trileaflet valve with mildly decreased opening.  Minimal aortic regurgitation.  3. Endocardial visualization enhanced with intravenous  injection of Ultrasonic Enhancing Agent (Definity). Severe  global left ventricular systolic dysfunction with regional  variation. No left ventricular thrombus. Septal motion  consistent with prior cardiac surgery.  4. Normal right ventricular size with decreased right  ventricular systolic function.  5. Estimated pulmonary artery systolic pressure equals 49  mm Hg, assuming right atrial pressure equals 8 mm Hg,  consistent with mild pulmonary pressures.  *** Compared with echocardiogram of 10/13/2019, results are  similar on today's study. Mean transmitral gradient is  increased on today's study in setting of faster heart rate.    < end of copied text >    ASSESSMENT/PLAN: 75y Female Albanian speaking h/o HTN, HLD, DM, CAD s/p CABG 2014 and prior PCI, severe mitral regurgitation (s/p mitral clip 2019), pulmonary HTN, HF (EF 21 % June 2019), CKD IV not on dialysis (b/l SCr 2-2.2), hypothyroidism BIBEMS 2/2 worsening work of breathing presents in acute on chronic systolic CHF with NYHA IV functional status.    - Continue BID IV high-dose Bumex - continue to trend creatinine and if increasing tomorrow would start dobutamine, discussed with RN importance of STRICT I/Os  - Replace K to 4-4.5 (vigorously, in anticipation of diuresis), and keep Mg 2.  - I have a low threshold to request starting inotropes for management of her heart failure, given lack of ability to tolerate GDMT and frequent admissions.    - Would maintain low dose metoprolol at current dose while diuresing  - Repeat TTE noted above with EF 20%  - May consider RHC at some point on this admission, once closer to euvolemic.  - Rec vascular consult given decreased LLE pulses and foot pain    S: Unchanged SOB on 4L NC but complaining of b/l foot pain more severe on left side. Denies chest pain. Review of systems otherwise (-)    Review of Systems:   Constitutional: [ ] fevers, [ ] chills.   Skin: [ ] dry skin. [ ] rashes.  Psychiatric: [ ] depression, [ ] anxiety.   Gastrointestinal: [ ] BRBPR, [ ] melena.   Neurological: [ ] confusion. [ ] seizures. [ ] shuffling gait.   Ears,Nose,Mouth and Throat: [ ] ear pain [ ] sore throat.   Eyes: [ ] diplopia.   Respiratory: [ ] hemoptysis. [ ] shortness of breath  Cardiovascular: See HPI above  Hematologic/Lymphatic: [ ] anemia. [ ] painful nodes. [ ] prolonged bleeding.   Genitourinary: [ ] hematuria. [ ] flank pain.   Endocrine: [ ] significant change in weight. [ ] intolerance to heat and cold.     Review of systems [x ] otherwise negative, [ ] otherwise unable to obtain    FH: no family history of sudden cardiac death in first degree relatives    SH: [ ] tobacco, [ ] alcohol, [ ] drugs         aspirin enteric coated 81 milliGRAM(s) Oral daily  atorvastatin 80 milliGRAM(s) Oral at bedtime  buMETAnide IVPB 4 milliGRAM(s) IV Intermittent every 12 hours  calamine/zinc oxide Lotion 1 Application(s) Topical three times a day  clobetasol 0.05% Cream 1 Application(s) Topical every 12 hours  clopidogrel Tablet 75 milliGRAM(s) Oral daily  dextrose 40% Gel 15 Gram(s) Oral once  dextrose 5%. 1000 milliLiter(s) IV Continuous <Continuous>  dextrose 5%. 1000 milliLiter(s) IV Continuous <Continuous>  dextrose 50% Injectable 25 Gram(s) IV Push once  dextrose 50% Injectable 12.5 Gram(s) IV Push once  dextrose 50% Injectable 25 Gram(s) IV Push once  glucagon  Injectable 1 milliGRAM(s) IntraMuscular once  heparin   Injectable 5000 Unit(s) SubCutaneous every 8 hours  hydrALAZINE 10 milliGRAM(s) Oral three times a day  insulin glargine Injectable (LANTUS) 25 Unit(s) SubCutaneous at bedtime  insulin lispro (ADMELOG) corrective regimen sliding scale   SubCutaneous at bedtime  insulin lispro (ADMELOG) corrective regimen sliding scale   SubCutaneous three times a day before meals  insulin lispro Injectable (ADMELOG) 10 Unit(s) SubCutaneous three times a day before meals  levothyroxine Injectable 25 MICROGram(s) IV Push at bedtime  melatonin 3 milliGRAM(s) Oral at bedtime  metolazone 2.5 milliGRAM(s) Oral <User Schedule>  metoprolol tartrate 12.5 milliGRAM(s) Oral every 12 hours                            9.1    10.32 )-----------( 322      ( 16 Jan 2021 06:40 )             30.3       Hemoglobin: 9.1 g/dL (01-16 @ 06:40)  Hemoglobin: 9.5 g/dL (01-15 @ 07:04)  Hemoglobin: 9.8 g/dL (01-14 @ 07:06)  Hemoglobin: 10.3 g/dL (01-13 @ 05:14)  Hemoglobin: 11.0 g/dL (01-12 @ 17:42)      01-16    141  |  105  |  63<H>  ----------------------------<  170<H>  3.5   |  19<L>  |  1.87<H>    Ca    9.4      16 Jan 2021 06:40  Phos  4.0     01-16  Mg     2.3     01-16      Creatinine Trend: 1.87<--, 2.11<--, 1.94<--, 2.07<--, 2.15<--    COAGS:           T(C): 37 (01-16-21 @ 04:19), Max: 37 (01-16-21 @ 04:19)  HR: 90 (01-16-21 @ 09:21) (79 - 111)  BP: 134/78 (01-16-21 @ 04:19) (122/66 - 156/69)  RR: 18 (01-16-21 @ 04:19) (18 - 20)  SpO2: 99% (01-16-21 @ 09:21) (98% - 100%)  Wt(kg): --    I&O's Summary    15 Dusty 2021 07:01  -  16 Jan 2021 07:00  --------------------------------------------------------  IN: 790 mL / OUT: 1860 mL / NET: -1070 mL      General: Well nourished in no acute distress. Alert and Oriented * 3.   Head: Normocephalic and atraumatic.   Neck: No JVD. No bruits. Supple. Does not appear to be enlarged.   Cardiovascular: + S1,S2 ; RRR Soft systolic murmur at the left lower sternal border. No rubs noted.    Lungs: Decrease BS at bases b/l  Abdomen: + BS, soft. Non tender. Non distended. No rebound. No guarding.   Extremities: No clubbing/cyanosis/edema. Difficulty palpating DP/PT pulses, found on doppler but appears weakened at Left DP  Neurologic: Moves all four extremities. Full range of motion.   Skin: Warm and moist. The patient's skin has normal elasticity and good skin turgor.   Psychiatric: Appropriate mood and affect.  Musculoskeletal: Normal range of motion, normal strength    TELEMETRY: NSR 80-90  ECG:  NSR,. iRBBB.  Echo: 2019 21% EF, severe PHTN, normal LA size, s/p MitraClip  NST: 2018. Prior LCx territory MI.      < from: TTE with Doppler (w/Cont) (01.13.21 @ 08:31) >  Conclusions:  1. Tethered mitral leaflets. Patient status-post mitral  clip placement. Mild-moderate mitral regurgitation. Mean  transmitral valve gradient equals 7-8 mm Hg. (HRabout 80s  bpm)  2. Aortic valve not well visualized; appears to be a  calcified trileaflet valve with mildly decreased opening.  Minimal aortic regurgitation.  3. Endocardial visualization enhanced with intravenous  injection of Ultrasonic Enhancing Agent (Definity). Severe  global left ventricular systolic dysfunction with regional  variation. No left ventricular thrombus. Septal motion  consistent with prior cardiac surgery.  4. Normal right ventricular size with decreased right  ventricular systolic function.  5. Estimated pulmonary artery systolic pressure equals 49  mm Hg, assuming right atrial pressure equals 8 mm Hg,  consistent with mild pulmonary pressures.  *** Compared with echocardiogram of 10/13/2019, results are  similar on today's study. Mean transmitral gradient is  increased on today's study in setting of faster heart rate.    < end of copied text >    ASSESSMENT/PLAN: 75y Female Vietnamese speaking h/o HTN, HLD, DM, CAD s/p CABG 2014 and prior PCI, severe mitral regurgitation (s/p mitral clip 2019), pulmonary HTN, HF (EF 21 % June 2019), CKD IV not on dialysis (b/l SCr 2-2.2), hypothyroidism BIBEMS 2/2 worsening work of breathing presents in acute on chronic systolic CHF with NYHA IV functional status.    - Continue BID IV high-dose Bumex - continue to trend creatinine and if increasing tomorrow would start dobutamine,   - Replace K to 4-4.5 (vigorously, in anticipation of diuresis), and keep Mg 2.  - Would maintain low dose metoprolol at current dose while diuresing  - Repeat TTE noted above with EF 20%  - May consider RHC at some point on this admission, once closer to euvolemic.  - Rec vascular consult given decreased LLE pulses and foot pain

## 2021-01-17 LAB
ALBUMIN SERPL ELPH-MCNC: 3.9 G/DL — SIGNIFICANT CHANGE UP (ref 3.3–5)
ALP SERPL-CCNC: 110 U/L — SIGNIFICANT CHANGE UP (ref 40–120)
ALT FLD-CCNC: 21 U/L — SIGNIFICANT CHANGE UP (ref 10–45)
ANION GAP SERPL CALC-SCNC: 18 MMOL/L — HIGH (ref 5–17)
ANION GAP SERPL CALC-SCNC: 18 MMOL/L — HIGH (ref 5–17)
AST SERPL-CCNC: 25 U/L — SIGNIFICANT CHANGE UP (ref 10–40)
BILIRUB SERPL-MCNC: 0.5 MG/DL — SIGNIFICANT CHANGE UP (ref 0.2–1.2)
BUN SERPL-MCNC: 79 MG/DL — HIGH (ref 7–23)
BUN SERPL-MCNC: 92 MG/DL — HIGH (ref 7–23)
CALCIUM SERPL-MCNC: 9.9 MG/DL — SIGNIFICANT CHANGE UP (ref 8.4–10.5)
CALCIUM SERPL-MCNC: 9.9 MG/DL — SIGNIFICANT CHANGE UP (ref 8.4–10.5)
CHLORIDE SERPL-SCNC: 95 MMOL/L — LOW (ref 96–108)
CHLORIDE SERPL-SCNC: 99 MMOL/L — SIGNIFICANT CHANGE UP (ref 96–108)
CO2 SERPL-SCNC: 20 MMOL/L — LOW (ref 22–31)
CO2 SERPL-SCNC: 22 MMOL/L — SIGNIFICANT CHANGE UP (ref 22–31)
CREAT SERPL-MCNC: 2.07 MG/DL — HIGH (ref 0.5–1.3)
CREAT SERPL-MCNC: 2.22 MG/DL — HIGH (ref 0.5–1.3)
CULTURE RESULTS: SIGNIFICANT CHANGE UP
CULTURE RESULTS: SIGNIFICANT CHANGE UP
GLUCOSE BLDC GLUCOMTR-MCNC: 252 MG/DL — HIGH (ref 70–99)
GLUCOSE BLDC GLUCOMTR-MCNC: 274 MG/DL — HIGH (ref 70–99)
GLUCOSE BLDC GLUCOMTR-MCNC: 314 MG/DL — HIGH (ref 70–99)
GLUCOSE BLDC GLUCOMTR-MCNC: 328 MG/DL — HIGH (ref 70–99)
GLUCOSE SERPL-MCNC: 277 MG/DL — HIGH (ref 70–99)
GLUCOSE SERPL-MCNC: 327 MG/DL — HIGH (ref 70–99)
HCT VFR BLD CALC: 32.7 % — LOW (ref 34.5–45)
HGB BLD-MCNC: 10.1 G/DL — LOW (ref 11.5–15.5)
MAGNESIUM SERPL-MCNC: 2.3 MG/DL — SIGNIFICANT CHANGE UP (ref 1.6–2.6)
MCHC RBC-ENTMCNC: 27 PG — SIGNIFICANT CHANGE UP (ref 27–34)
MCHC RBC-ENTMCNC: 30.9 GM/DL — LOW (ref 32–36)
MCV RBC AUTO: 87.4 FL — SIGNIFICANT CHANGE UP (ref 80–100)
NRBC # BLD: 0 /100 WBCS — SIGNIFICANT CHANGE UP (ref 0–0)
PHOSPHATE SERPL-MCNC: 4.1 MG/DL — SIGNIFICANT CHANGE UP (ref 2.5–4.5)
PLATELET # BLD AUTO: 391 K/UL — SIGNIFICANT CHANGE UP (ref 150–400)
POTASSIUM SERPL-MCNC: 3.5 MMOL/L — SIGNIFICANT CHANGE UP (ref 3.5–5.3)
POTASSIUM SERPL-MCNC: 4 MMOL/L — SIGNIFICANT CHANGE UP (ref 3.5–5.3)
POTASSIUM SERPL-SCNC: 3.5 MMOL/L — SIGNIFICANT CHANGE UP (ref 3.5–5.3)
POTASSIUM SERPL-SCNC: 4 MMOL/L — SIGNIFICANT CHANGE UP (ref 3.5–5.3)
PROT SERPL-MCNC: 8.6 G/DL — HIGH (ref 6–8.3)
RBC # BLD: 3.74 M/UL — LOW (ref 3.8–5.2)
RBC # FLD: 17.3 % — HIGH (ref 10.3–14.5)
SODIUM SERPL-SCNC: 135 MMOL/L — SIGNIFICANT CHANGE UP (ref 135–145)
SODIUM SERPL-SCNC: 137 MMOL/L — SIGNIFICANT CHANGE UP (ref 135–145)
SPECIMEN SOURCE: SIGNIFICANT CHANGE UP
SPECIMEN SOURCE: SIGNIFICANT CHANGE UP
WBC # BLD: 10.03 K/UL — SIGNIFICANT CHANGE UP (ref 3.8–10.5)
WBC # FLD AUTO: 10.03 K/UL — SIGNIFICANT CHANGE UP (ref 3.8–10.5)

## 2021-01-17 PROCEDURE — 99233 SBSQ HOSP IP/OBS HIGH 50: CPT

## 2021-01-17 PROCEDURE — 99233 SBSQ HOSP IP/OBS HIGH 50: CPT | Mod: GC

## 2021-01-17 RX ORDER — INSULIN LISPRO 100/ML
18 VIAL (ML) SUBCUTANEOUS
Refills: 0 | Status: DISCONTINUED | OUTPATIENT
Start: 2021-01-17 | End: 2021-01-18

## 2021-01-17 RX ORDER — BUMETANIDE 0.25 MG/ML
4 INJECTION INTRAMUSCULAR; INTRAVENOUS EVERY 8 HOURS
Refills: 0 | Status: DISCONTINUED | OUTPATIENT
Start: 2021-01-17 | End: 2021-01-18

## 2021-01-17 RX ORDER — INSULIN GLARGINE 100 [IU]/ML
40 INJECTION, SOLUTION SUBCUTANEOUS AT BEDTIME
Refills: 0 | Status: DISCONTINUED | OUTPATIENT
Start: 2021-01-17 | End: 2021-01-18

## 2021-01-17 RX ADMIN — Medication 3: at 11:54

## 2021-01-17 RX ADMIN — HEPARIN SODIUM 5000 UNIT(S): 5000 INJECTION INTRAVENOUS; SUBCUTANEOUS at 05:06

## 2021-01-17 RX ADMIN — CALAMINE AND ZINC OXIDE AND PHENOL 1 APPLICATION(S): 160; 10 LOTION TOPICAL at 21:12

## 2021-01-17 RX ADMIN — CALAMINE AND ZINC OXIDE AND PHENOL 1 APPLICATION(S): 160; 10 LOTION TOPICAL at 13:12

## 2021-01-17 RX ADMIN — Medication 25 MILLIGRAM(S): at 13:11

## 2021-01-17 RX ADMIN — Medication 1 APPLICATION(S): at 17:12

## 2021-01-17 RX ADMIN — Medication 12.5 MILLIGRAM(S): at 17:12

## 2021-01-17 RX ADMIN — Medication 81 MILLIGRAM(S): at 11:22

## 2021-01-17 RX ADMIN — INSULIN GLARGINE 40 UNIT(S): 100 INJECTION, SOLUTION SUBCUTANEOUS at 21:12

## 2021-01-17 RX ADMIN — HEPARIN SODIUM 5000 UNIT(S): 5000 INJECTION INTRAVENOUS; SUBCUTANEOUS at 13:11

## 2021-01-17 RX ADMIN — BUMETANIDE 132 MILLIGRAM(S): 0.25 INJECTION INTRAMUSCULAR; INTRAVENOUS at 21:12

## 2021-01-17 RX ADMIN — Medication 12.5 MILLIGRAM(S): at 05:06

## 2021-01-17 RX ADMIN — Medication 4: at 16:49

## 2021-01-17 RX ADMIN — Medication 25 MILLIGRAM(S): at 21:12

## 2021-01-17 RX ADMIN — BUMETANIDE 132 MILLIGRAM(S): 0.25 INJECTION INTRAMUSCULAR; INTRAVENOUS at 13:15

## 2021-01-17 RX ADMIN — Medication 15 UNIT(S): at 11:54

## 2021-01-17 RX ADMIN — Medication 3: at 07:52

## 2021-01-17 RX ADMIN — Medication 2: at 21:12

## 2021-01-17 RX ADMIN — Medication 15 UNIT(S): at 07:52

## 2021-01-17 RX ADMIN — CLOPIDOGREL BISULFATE 75 MILLIGRAM(S): 75 TABLET, FILM COATED ORAL at 11:22

## 2021-01-17 RX ADMIN — BUMETANIDE 132 MILLIGRAM(S): 0.25 INJECTION INTRAMUSCULAR; INTRAVENOUS at 05:07

## 2021-01-17 RX ADMIN — Medication 18 UNIT(S): at 16:49

## 2021-01-17 RX ADMIN — Medication 3 MILLIGRAM(S): at 21:12

## 2021-01-17 RX ADMIN — CALAMINE AND ZINC OXIDE AND PHENOL 1 APPLICATION(S): 160; 10 LOTION TOPICAL at 05:07

## 2021-01-17 RX ADMIN — Medication 1 APPLICATION(S): at 05:07

## 2021-01-17 RX ADMIN — ATORVASTATIN CALCIUM 80 MILLIGRAM(S): 80 TABLET, FILM COATED ORAL at 21:12

## 2021-01-17 RX ADMIN — Medication 25 MICROGRAM(S): at 21:12

## 2021-01-17 RX ADMIN — Medication 25 MILLIGRAM(S): at 05:06

## 2021-01-17 RX ADMIN — HEPARIN SODIUM 5000 UNIT(S): 5000 INJECTION INTRAVENOUS; SUBCUTANEOUS at 21:12

## 2021-01-17 NOTE — PROGRESS NOTE ADULT - SUBJECTIVE AND OBJECTIVE BOX
Interval History:  reports SOB  urinating well    Medications:  aspirin enteric coated 81 milliGRAM(s) Oral daily  atorvastatin 80 milliGRAM(s) Oral at bedtime  buMETAnide IVPB 4 milliGRAM(s) IV Intermittent every 8 hours  calamine/zinc oxide Lotion 1 Application(s) Topical three times a day  clobetasol 0.05% Cream 1 Application(s) Topical every 12 hours  clopidogrel Tablet 75 milliGRAM(s) Oral daily  dextrose 40% Gel 15 Gram(s) Oral once  dextrose 5%. 1000 milliLiter(s) IV Continuous <Continuous>  dextrose 5%. 1000 milliLiter(s) IV Continuous <Continuous>  dextrose 50% Injectable 25 Gram(s) IV Push once  dextrose 50% Injectable 12.5 Gram(s) IV Push once  dextrose 50% Injectable 25 Gram(s) IV Push once  glucagon  Injectable 1 milliGRAM(s) IntraMuscular once  heparin   Injectable 5000 Unit(s) SubCutaneous every 8 hours  hydrALAZINE 25 milliGRAM(s) Oral every 8 hours  insulin glargine Injectable (LANTUS) 40 Unit(s) SubCutaneous at bedtime  insulin lispro (ADMELOG) corrective regimen sliding scale   SubCutaneous three times a day before meals  insulin lispro (ADMELOG) corrective regimen sliding scale   SubCutaneous at bedtime  insulin lispro Injectable (ADMELOG) 18 Unit(s) SubCutaneous three times a day before meals  levothyroxine Injectable 25 MICROGram(s) IV Push at bedtime  melatonin 3 milliGRAM(s) Oral at bedtime  metoprolol tartrate 12.5 milliGRAM(s) Oral every 12 hours      Vitals:  T(C): 36.6 (21 @ 12:18), Max: 37.3 (21 @ 20:28)  HR: 87 (21 @ 12:18) (87 - 95)  BP: 118/70 (21 @ 12:18) (113/70 - 144/78)  BP(mean): --  RR: 18 (21 @ 12:18) (18 - 28)  SpO2: 100% (21 @ 12:18) (99% - 100%)    Daily     Daily Weight in k.9 (2021 11:11)        I&O's Summary    2021 07:01  -  2021 07:00  --------------------------------------------------------  IN: 350 mL / OUT: 1750 mL / NET: -1400 mL    2021 07:01  -  2021 16:13  --------------------------------------------------------  IN: 540 mL / OUT: 450 mL / NET: 90 mL    Physical Exam:  Appearance: No Acute Distress  HEENT: PERRL  Neck: JVD approx 8-10 with mild HJR  Cardiovascular: Normal S1 S2, No murmurs/rubs/gallops  Respiratory: Clear to auscultation bilaterally  Gastrointestinal: Soft, Non-tender	  Skin: No cyanosis	  Neurologic: Non-focal  Extremities: No LE edema  Psychiatry: A & O x 3, Mood & affect appropriate    Labs:                        10.1   10.03 )-----------( 391      ( 2021 06:58 )             32.7     -    137  |  99  |  79<H>  ----------------------------<  277<H>  4.0   |  20<L>  |  2.07<H>    Ca    9.9      2021 07:02  Phos  4.1       Mg     2.3         TPro  8.0  /  Alb  3.7  /  TBili  0.5  /  DBili  x   /  AST  21  /  ALT  21  /  AlkPhos  103

## 2021-01-17 NOTE — PROGRESS NOTE ADULT - ASSESSMENT
74 yo F w/ PMH of CKD stage 4 (baseline Cr 1.9-2.2) HTN, T2DM, CAD s/p CABG X3 (2014 at [facility]), non-dilated ICM (EF 20-25%), severe mitral regurgitation s/p mitral clip (6/13/19), severe pulm HTN, hypothyroidism who presented for evaluation of SOB and was admitted for ADHF.    CKD stage 4  - baseline Cr 1.9-2.2; has had recurrent MERVIN's over the years  - CKD is likely 2/2 recurrent MERVIN's + underlying DM/HTN  - Scr fluctuating however remains within baseline   - Agree w. IV diuretics per heart failure team   - renal sonogram in the past with simple renal cyst  - Avoid nephrotoxins including NSAIDs, IV contrast (if possible), [employer] products  - monitor I/O's accurately    HTN  BP controlled  Low salt diet   MOnitor BP        Proteinuria/Hematuria  - likely from underlying DM  - has 1.8 grams proteinuria  - Hep B, Hep C, HIV RF, C3, C4, p-ANCA, c-ANCA, RPR neg  - weak gamma-migrating protein, weak IgG lambda protein band noted in the past. Pt to follow w/ heme   - DEAN 1:320 positive  - RPR neg, FTA +

## 2021-01-17 NOTE — PROGRESS NOTE ADULT - ATTENDING COMMENTS
Acute on chronic systolic heart failure- cont Bumex 4 mg IV BID, metoprolol, hydralazine  Type 2 DM- Lantus/Admelog adjusted for better glycemic control

## 2021-01-17 NOTE — PROGRESS NOTE ADULT - ASSESSMENT
75y.o F Maori speaking h/o HTN, HLD, DM, CAD s/p CABG 2014 and prior PCI, severe mitral regurgitation (s/p mitral clip 2019), pulmonary HTN (TTE 2019), HF (EF 21 % June 2019), CKD IV not on dialysis (b/l SCr 2-2.2), hypothyroidism BIBEMS 2/2 worsening work of breathing, found to have crackles, elevated proBNP and b/l pulmonary edema on CXR c/w acute exacerbation of CHF.

## 2021-01-17 NOTE — PROGRESS NOTE ADULT - ATTENDING COMMENTS
Patient seen and examined, agree with above assessment and plan as transcribed above.    - Crt more- or less stable  - Diuresing well  - Edema improving    Vargas Adame MD, FACC  BEEPER [phone]

## 2021-01-17 NOTE — PROGRESS NOTE ADULT - SUBJECTIVE AND OBJECTIVE BOX
[facility] [place] [facility]   MD RAHEEL SHAH MD RUORU WONG, PA    TEL:  OFFICE: [phone]  DR SANTOYO CELL: [phone]  JUSTEN KENDALL CELL: [phone]  DR. NGUYEN CELL: [phone]  DR. RIDER CELL: [phone]    FROM 5 PM - 7 AM PLEASE CALL ANSWERING SERVICE: [phone]    RENAL FOLLOW UP NOTE--Date of Service 01-17-21 @ 07:51  --------------------------------------------------------------------------------  HPI:      Pt seen and examined at bedside.       PAST HISTORY  --------------------------------------------------------------------------------  No significant changes to PMH, PSH, FHx, SHx, unless otherwise noted    ALLERGIES & MEDICATIONS  --------------------------------------------------------------------------------  Allergies    azithromycin (Hives; Pruritus)    Intolerances      Standing Inpatient Medications  aspirin enteric coated 81 milliGRAM(s) Oral daily  atorvastatin 80 milliGRAM(s) Oral at bedtime  buMETAnide IVPB 4 milliGRAM(s) IV Intermittent every 12 hours  calamine/zinc oxide Lotion 1 Application(s) Topical three times a day  clobetasol 0.05% Cream 1 Application(s) Topical every 12 hours  clopidogrel Tablet 75 milliGRAM(s) Oral daily  dextrose 40% Gel 15 Gram(s) Oral once  dextrose 5%. 1000 milliLiter(s) IV Continuous <Continuous>  dextrose 5%. 1000 milliLiter(s) IV Continuous <Continuous>  dextrose 50% Injectable 25 Gram(s) IV Push once  dextrose 50% Injectable 12.5 Gram(s) IV Push once  dextrose 50% Injectable 25 Gram(s) IV Push once  glucagon  Injectable 1 milliGRAM(s) IntraMuscular once  heparin   Injectable 5000 Unit(s) SubCutaneous every 8 hours  hydrALAZINE 25 milliGRAM(s) Oral every 8 hours  insulin glargine Injectable (LANTUS) 30 Unit(s) SubCutaneous at bedtime  insulin lispro (ADMELOG) corrective regimen sliding scale   SubCutaneous three times a day before meals  insulin lispro (ADMELOG) corrective regimen sliding scale   SubCutaneous at bedtime  insulin lispro Injectable (ADMELOG) 15 Unit(s) SubCutaneous three times a day before meals  levothyroxine Injectable 25 MICROGram(s) IV Push at bedtime  melatonin 3 milliGRAM(s) Oral at bedtime  metoprolol tartrate 12.5 milliGRAM(s) Oral every 12 hours    PRN Inpatient Medications      REVIEW OF SYSTEMS  --------------------------------------------------------------------------------  General: no fever  CVS: no chest pain  RESP: + sob, no cough  ABD: no abdominal pain  : no dysuria,  MSK: no edema     VITALS/PHYSICAL EXAM  --------------------------------------------------------------------------------  T(C): 36.7 (01-17-21 @ 05:00), Max: 37.3 (01-16-21 @ 20:28)  HR: 92 (01-17-21 @ 05:00) (90 - 95)  BP: 126/77 (01-17-21 @ 05:00) (109/68 - 144/78)  RR: 22 (01-17-21 @ 05:00) (18 - 28)  SpO2: 100% (01-17-21 @ 05:00) (99% - 100%)  Wt(kg): --        01-16-21 @ 07:01  -  01-17-21 @ 07:00  --------------------------------------------------------  IN: 350 mL / OUT: 1750 mL / NET: -1400 mL      Physical Exam:  	Gen: NAD  	HEENT: MMM  	Pulm: CTA B/L  	CV: S1S2  	Abd: Soft, +BS  	Ext: No LE edema B/L                      Neuro: Awake   	Skin: Warm and Dry   	Vascular access: no HD catheter           no  polo  LABS/STUDIES  --------------------------------------------------------------------------------              9.1    10.32 >-----------<  322      [01-16-21 @ 06:40]              30.3     137  |  99  |  79  ----------------------------<  277      [01-17-21 @ 07:02]  4.0   |  20  |  2.07        Ca     9.9     [01-17-21 @ 07:02]      Mg     2.3     [01-17-21 @ 07:02]      Phos  4.1     [01-17-21 @ 07:02]    TPro  8.0  /  Alb  3.7  /  TBili  0.5  /  DBili  x   /  AST  21  /  ALT  21  /  AlkPhos  103  [01-16-21 @ 17:41]          Creatinine Trend:  SCr 2.07 [01-17 @ 07:02]  SCr 2.07 [01-16 @ 17:41]  SCr 1.87 [01-16 @ 06:40]  SCr 2.11 [01-15 @ 07:00]  SCr 1.94 [01-14 @ 07:06]    Urinalysis - [01-12-21 @ 19:28]      Color Light Yellow / Appearance Clear / SG 1.011 / pH 6.0      Gluc Negative / Ketone Negative  / Bili Negative / Urobili Negative       Blood Negative / Protein 30 mg/dL / Leuk Est Moderate / Nitrite Negative      RBC 1 / WBC 5 / Hyaline 4 / Gran  / Sq Epi  / Non Sq Epi 1 / Bacteria Negative      Ferritin 111      [01-13-21 @ 01:42]  HbA1c 7.5      [10-08-19 @ 14:30]  TSH 2.15      [01-13-21 @ 10:07]

## 2021-01-17 NOTE — PROGRESS NOTE ADULT - SUBJECTIVE AND OBJECTIVE BOX
Patient is a 75y old  Female who presents with a chief complaint of increased work of breathing (17 Jan 2021 09:24)      SUBJECTIVE / OVERNIGHT EVENTS:    Overnight, patient was tachypneic and was given additional dose of lasix. This morning, she still feels short of breath but less tachypneic. She denies any fever, chills, nausea, vomiting, abdominal pain or diarrhea.       MEDICATIONS  (STANDING):  aspirin enteric coated 81 milliGRAM(s) Oral daily  atorvastatin 80 milliGRAM(s) Oral at bedtime  buMETAnide IVPB 4 milliGRAM(s) IV Intermittent every 12 hours  calamine/zinc oxide Lotion 1 Application(s) Topical three times a day  clobetasol 0.05% Cream 1 Application(s) Topical every 12 hours  clopidogrel Tablet 75 milliGRAM(s) Oral daily  dextrose 40% Gel 15 Gram(s) Oral once  dextrose 5%. 1000 milliLiter(s) (50 mL/Hr) IV Continuous <Continuous>  dextrose 5%. 1000 milliLiter(s) (100 mL/Hr) IV Continuous <Continuous>  dextrose 50% Injectable 25 Gram(s) IV Push once  dextrose 50% Injectable 12.5 Gram(s) IV Push once  dextrose 50% Injectable 25 Gram(s) IV Push once  glucagon  Injectable 1 milliGRAM(s) IntraMuscular once  heparin   Injectable 5000 Unit(s) SubCutaneous every 8 hours  hydrALAZINE 25 milliGRAM(s) Oral every 8 hours  insulin glargine Injectable (LANTUS) 30 Unit(s) SubCutaneous at bedtime  insulin lispro (ADMELOG) corrective regimen sliding scale   SubCutaneous three times a day before meals  insulin lispro (ADMELOG) corrective regimen sliding scale   SubCutaneous at bedtime  insulin lispro Injectable (ADMELOG) 15 Unit(s) SubCutaneous three times a day before meals  levothyroxine Injectable 25 MICROGram(s) IV Push at bedtime  melatonin 3 milliGRAM(s) Oral at bedtime  metoprolol tartrate 12.5 milliGRAM(s) Oral every 12 hours    MEDICATIONS  (PRN):      Vital Signs Last 24 Hrs  T(C): 36.7 (17 Jan 2021 05:00), Max: 37.3 (16 Jan 2021 20:28)  T(F): 98 (17 Jan 2021 05:00), Max: 99.2 (16 Jan 2021 20:28)  HR: 92 (17 Jan 2021 05:00) (90 - 95)  BP: 126/77 (17 Jan 2021 05:00) (109/68 - 144/78)  BP(mean): --  RR: 22 (17 Jan 2021 05:00) (18 - 28)  SpO2: 100% (17 Jan 2021 05:00) (99% - 100%)      PHYSICAL EXAM  GENERAL: in mild respiratory distress, speaking in fragmented sentences, AAOX3  HEAD:  Atraumatic, Normocephalic  NECK: Supple, No JVD  CHEST/LUNG: Scattered crackles w/o any wheezing   HEART: Regular rate and rhythm; No murmurs, rubs, or gallops  ABDOMEN: Soft, Nontender, Nondistended; Bowel sounds present  EXTREMITIES:  2+ Peripheral Pulses, No clubbing, cyanosis, or edema  PSYCH: AAOx3  Skin: Dry rash in LE and UE.     CAPILLARY BLOOD GLUCOSE      POCT Blood Glucose.: 252 mg/dL (17 Jan 2021 07:49)  POCT Blood Glucose.: 343 mg/dL (16 Jan 2021 21:02)  POCT Blood Glucose.: 303 mg/dL (16 Jan 2021 19:36)  POCT Blood Glucose.: 328 mg/dL (16 Jan 2021 18:26)  POCT Blood Glucose.: 417 mg/dL (16 Jan 2021 16:34)  POCT Blood Glucose.: 420 mg/dL (16 Jan 2021 16:33)  POCT Blood Glucose.: 164 mg/dL (16 Jan 2021 11:13)    I&O's Summary    16 Jan 2021 07:01  -  17 Jan 2021 07:00  --------------------------------------------------------  IN: 350 mL / OUT: 1750 mL / NET: -1400 mL    17 Jan 2021 07:01  -  17 Jan 2021 11:01  --------------------------------------------------------  IN: 240 mL / OUT: 0 mL / NET: 240 mL        LABS:                        10.1   10.03 )-----------( 391      ( 17 Jan 2021 06:58 )             32.7     01-17    137  |  99  |  79<H>  ----------------------------<  277<H>  4.0   |  20<L>  |  2.07<H>    Ca    9.9      17 Jan 2021 07:02  Phos  4.1     01-17  Mg     2.3     01-17    TPro  8.0  /  Alb  3.7  /  TBili  0.5  /  DBili  x   /  AST  21  /  ALT  21  /  AlkPhos  103  01-16

## 2021-01-17 NOTE — PROGRESS NOTE ADULT - PROBLEM SELECTOR PLAN 5
-Takes 60 units of lantus at bedtime and 18 units of humalog   -C/w lantus 25 units of lantus at bedtime   -C/w 10 units admelog premeal

## 2021-01-17 NOTE — PROGRESS NOTE ADULT - SUBJECTIVE AND OBJECTIVE BOX
S: pt requiring N/C o2 ,     Review of Systems:   Constitutional: [ ] fevers, [ ] chills.   Skin: [ ] dry skin. [ ] rashes.  Psychiatric: [ ] depression, [ ] anxiety.   Gastrointestinal: [ ] BRBPR, [ ] melena.   Neurological: [ ] confusion. [ ] seizures. [ ] shuffling gait.   Ears,Nose,Mouth and Throat: [ ] ear pain [ ] sore throat.   Eyes: [ ] diplopia.   Respiratory: [ ] hemoptysis. [ ] shortness of breath  Cardiovascular: See HPI above  Hematologic/Lymphatic: [ ] anemia. [ ] painful nodes. [ ] prolonged bleeding.   Genitourinary: [ ] hematuria. [ ] flank pain.   Endocrine: [ ] significant change in weight. [ ] intolerance to heat and cold.     Review of systems [x ] otherwise negative, [ ] otherwise unable to obtain    FH: no family history of sudden cardiac death in first degree relatives    SH: [ ] tobacco, [ ] alcohol, [ ] drugs         aspirin enteric coated 81 milliGRAM(s) Oral daily  atorvastatin 80 milliGRAM(s) Oral at bedtime  buMETAnide IVPB 4 milliGRAM(s) IV Intermittent every 12 hours  calamine/zinc oxide Lotion 1 Application(s) Topical three times a day  clobetasol 0.05% Cream 1 Application(s) Topical every 12 hours  clopidogrel Tablet 75 milliGRAM(s) Oral daily  dextrose 40% Gel 15 Gram(s) Oral once  dextrose 5%. 1000 milliLiter(s) IV Continuous <Continuous>  dextrose 5%. 1000 milliLiter(s) IV Continuous <Continuous>  dextrose 50% Injectable 25 Gram(s) IV Push once  dextrose 50% Injectable 12.5 Gram(s) IV Push once  dextrose 50% Injectable 25 Gram(s) IV Push once  glucagon  Injectable 1 milliGRAM(s) IntraMuscular once  heparin   Injectable 5000 Unit(s) SubCutaneous every 8 hours  hydrALAZINE 25 milliGRAM(s) Oral every 8 hours  insulin glargine Injectable (LANTUS) 30 Unit(s) SubCutaneous at bedtime  insulin lispro (ADMELOG) corrective regimen sliding scale   SubCutaneous three times a day before meals  insulin lispro (ADMELOG) corrective regimen sliding scale   SubCutaneous at bedtime  insulin lispro Injectable (ADMELOG) 15 Unit(s) SubCutaneous three times a day before meals  levothyroxine Injectable 25 MICROGram(s) IV Push at bedtime  melatonin 3 milliGRAM(s) Oral at bedtime  metolazone 2.5 milliGRAM(s) Oral <User Schedule>  metoprolol tartrate 12.5 milliGRAM(s) Oral every 12 hours                            10.1   10.03 )-----------( 391      ( 17 Jan 2021 06:58 )             32.7       Hemoglobin: 10.1 g/dL (01-17 @ 06:58)  Hemoglobin: 9.1 g/dL (01-16 @ 06:40)  Hemoglobin: 9.5 g/dL (01-15 @ 07:04)  Hemoglobin: 9.8 g/dL (01-14 @ 07:06)  Hemoglobin: 10.3 g/dL (01-13 @ 05:14)      01-17    137  |  99  |  79<H>  ----------------------------<  277<H>  4.0   |  20<L>  |  2.07<H>    Ca    9.9      17 Jan 2021 07:02  Phos  4.1     01-17  Mg     2.3     01-17    TPro  8.0  /  Alb  3.7  /  TBili  0.5  /  DBili  x   /  AST  21  /  ALT  21  /  AlkPhos  103  01-16    Creatinine Trend: 2.07<--, 2.07<--, 1.87<--, 2.11<--, 1.94<--, 2.07<--    COAGS:           T(C): 36.7 (01-17-21 @ 05:00), Max: 37.3 (01-16-21 @ 20:28)  HR: 92 (01-17-21 @ 05:00) (90 - 95)  BP: 126/77 (01-17-21 @ 05:00) (109/68 - 144/78)  RR: 22 (01-17-21 @ 05:00) (18 - 28)  SpO2: 100% (01-17-21 @ 05:00) (99% - 100%)  Wt(kg): --    I&O's Summary    16 Jan 2021 07:01  -  17 Jan 2021 07:00  --------------------------------------------------------  IN: 350 mL / OUT: 1750 mL / NET: -1400 mL    17 Jan 2021 07:01  -  17 Jan 2021 09:25  --------------------------------------------------------  IN: 240 mL / OUT: 0 mL / NET: 240 mL         General: Well nourished in no acute distress. Alert and Oriented * 3.   Head: Normocephalic and atraumatic.   Neck: No JVD. No bruits. Supple. Does not appear to be enlarged.   Cardiovascular: + S1,S2 ; RRR Soft systolic murmur at the left lower sternal border. No rubs noted.    Lungs: Decrease BS at bases b/l  Abdomen: + BS, soft. Non tender. Non distended. No rebound. No guarding.   Extremities: No clubbing/cyanosis/edema. Difficulty palpating DP/PT pulses, found on doppler but appears weakened at Left DP  Neurologic: Moves all four extremities. Full range of motion.   Skin: Warm and moist. The patient's skin has normal elasticity and good skin turgor.   Psychiatric: Appropriate mood and affect.  Musculoskeletal: Normal range of motion, normal strength    TELEMETRY: NSR 80-90  ECG:  NSR,. iRBBB.  Echo: 2019 21% EF, severe PHTN, normal LA size, s/p MitraClip  NST: 2018. Prior LCx territory MI.      < from: TTE with Doppler (w/Cont) (01.13.21 @ 08:31) >  Conclusions:  1. Tethered mitral leaflets. Patient status-post mitral  clip placement. Mild-moderate mitral regurgitation. Mean  transmitral valve gradient equals 7-8 mm Hg. (HRabout 80s  bpm)  2. Aortic valve not well visualized; appears to be a  calcified trileaflet valve with mildly decreased opening.  Minimal aortic regurgitation.  3. Endocardial visualization enhanced with intravenous  injection of Ultrasonic Enhancing Agent (Definity). Severe  global left ventricular systolic dysfunction with regional  variation. No left ventricular thrombus. Septal motion  consistent with prior cardiac surgery.  4. Normal right ventricular size with decreased right  ventricular systolic function.  5. Estimated pulmonary artery systolic pressure equals 49  mm Hg, assuming right atrial pressure equals 8 mm Hg,  consistent with mild pulmonary pressures.  *** Compared with echocardiogram of 10/13/2019, results are  similar on today's study. Mean transmitral gradient is  increased on today's study in setting of faster heart rate.    < end of copied text >    ASSESSMENT/PLAN: 75y Female Icelandic speaking h/o HTN, HLD, DM, CAD s/p CABG 2014 and prior PCI, severe mitral regurgitation (s/p mitral clip 2019), pulmonary HTN, HF (EF 21 % June 2019), CKD IV not on dialysis (b/l SCr 2-2.2), hypothyroidism BIBEMS 2/2 worsening work of breathing presents in acute on chronic systolic CHF with NYHA IV functional status.    - Continue BID IV high-dose Bumex - continue to trend creatinine and if increasing tomorrow would start dobutamine,   - Replace K to 4-4.5 (vigorously, in anticipation of diuresis), and keep Mg 2.  - Would maintain low dose metoprolol at current dose while diuresing  - Repeat TTE noted above with EF 20%  - May consider RHC at some point on this admission, once closer to euvolemic.  - Rec vascular consult given decreased LLE pulses and foot pain

## 2021-01-18 LAB
ALBUMIN SERPL ELPH-MCNC: 3.9 G/DL — SIGNIFICANT CHANGE UP (ref 3.3–5)
ALP SERPL-CCNC: 105 U/L — SIGNIFICANT CHANGE UP (ref 40–120)
ALT FLD-CCNC: 24 U/L — SIGNIFICANT CHANGE UP (ref 10–45)
ANION GAP SERPL CALC-SCNC: 19 MMOL/L — HIGH (ref 5–17)
ANION GAP SERPL CALC-SCNC: 20 MMOL/L — HIGH (ref 5–17)
AST SERPL-CCNC: 38 U/L — SIGNIFICANT CHANGE UP (ref 10–40)
BILIRUB SERPL-MCNC: 0.3 MG/DL — SIGNIFICANT CHANGE UP (ref 0.2–1.2)
BUN SERPL-MCNC: 107 MG/DL — HIGH (ref 7–23)
BUN SERPL-MCNC: 98 MG/DL — HIGH (ref 7–23)
CALCIUM SERPL-MCNC: 9.7 MG/DL — SIGNIFICANT CHANGE UP (ref 8.4–10.5)
CALCIUM SERPL-MCNC: 9.7 MG/DL — SIGNIFICANT CHANGE UP (ref 8.4–10.5)
CHLORIDE SERPL-SCNC: 91 MMOL/L — LOW (ref 96–108)
CHLORIDE SERPL-SCNC: 93 MMOL/L — LOW (ref 96–108)
CO2 SERPL-SCNC: 20 MMOL/L — LOW (ref 22–31)
CO2 SERPL-SCNC: 24 MMOL/L — SIGNIFICANT CHANGE UP (ref 22–31)
CREAT SERPL-MCNC: 2.39 MG/DL — HIGH (ref 0.5–1.3)
CREAT SERPL-MCNC: 2.54 MG/DL — HIGH (ref 0.5–1.3)
GLUCOSE BLDC GLUCOMTR-MCNC: 242 MG/DL — HIGH (ref 70–99)
GLUCOSE BLDC GLUCOMTR-MCNC: 353 MG/DL — HIGH (ref 70–99)
GLUCOSE BLDC GLUCOMTR-MCNC: 387 MG/DL — HIGH (ref 70–99)
GLUCOSE BLDC GLUCOMTR-MCNC: 388 MG/DL — HIGH (ref 70–99)
GLUCOSE BLDC GLUCOMTR-MCNC: 402 MG/DL — HIGH (ref 70–99)
GLUCOSE BLDC GLUCOMTR-MCNC: 473 MG/DL — CRITICAL HIGH (ref 70–99)
GLUCOSE BLDC GLUCOMTR-MCNC: 491 MG/DL — CRITICAL HIGH (ref 70–99)
GLUCOSE SERPL-MCNC: 246 MG/DL — HIGH (ref 70–99)
GLUCOSE SERPL-MCNC: 422 MG/DL — HIGH (ref 70–99)
HCT VFR BLD CALC: 35.6 % — SIGNIFICANT CHANGE UP (ref 34.5–45)
HGB BLD-MCNC: 10.8 G/DL — LOW (ref 11.5–15.5)
MAGNESIUM SERPL-MCNC: 2.3 MG/DL — SIGNIFICANT CHANGE UP (ref 1.6–2.6)
MCHC RBC-ENTMCNC: 26.1 PG — LOW (ref 27–34)
MCHC RBC-ENTMCNC: 30.3 GM/DL — LOW (ref 32–36)
MCV RBC AUTO: 86 FL — SIGNIFICANT CHANGE UP (ref 80–100)
NRBC # BLD: 0 /100 WBCS — SIGNIFICANT CHANGE UP (ref 0–0)
PHOSPHATE SERPL-MCNC: 5.3 MG/DL — HIGH (ref 2.5–4.5)
PLATELET # BLD AUTO: 406 K/UL — HIGH (ref 150–400)
POTASSIUM SERPL-MCNC: 3.4 MMOL/L — LOW (ref 3.5–5.3)
POTASSIUM SERPL-MCNC: 4 MMOL/L — SIGNIFICANT CHANGE UP (ref 3.5–5.3)
POTASSIUM SERPL-SCNC: 3.4 MMOL/L — LOW (ref 3.5–5.3)
POTASSIUM SERPL-SCNC: 4 MMOL/L — SIGNIFICANT CHANGE UP (ref 3.5–5.3)
PROT SERPL-MCNC: 8 G/DL — SIGNIFICANT CHANGE UP (ref 6–8.3)
RBC # BLD: 4.14 M/UL — SIGNIFICANT CHANGE UP (ref 3.8–5.2)
RBC # FLD: 17.1 % — HIGH (ref 10.3–14.5)
SODIUM SERPL-SCNC: 131 MMOL/L — LOW (ref 135–145)
SODIUM SERPL-SCNC: 136 MMOL/L — SIGNIFICANT CHANGE UP (ref 135–145)
WBC # BLD: 8.9 K/UL — SIGNIFICANT CHANGE UP (ref 3.8–10.5)
WBC # FLD AUTO: 8.9 K/UL — SIGNIFICANT CHANGE UP (ref 3.8–10.5)

## 2021-01-18 PROCEDURE — 99233 SBSQ HOSP IP/OBS HIGH 50: CPT | Mod: GC

## 2021-01-18 PROCEDURE — 99233 SBSQ HOSP IP/OBS HIGH 50: CPT

## 2021-01-18 RX ORDER — METOPROLOL TARTRATE 50 MG
25 TABLET ORAL EVERY 8 HOURS
Refills: 0 | Status: DISCONTINUED | OUTPATIENT
Start: 2021-01-18 | End: 2021-01-19

## 2021-01-18 RX ORDER — INSULIN LISPRO 100/ML
20 VIAL (ML) SUBCUTANEOUS
Refills: 0 | Status: DISCONTINUED | OUTPATIENT
Start: 2021-01-18 | End: 2021-01-19

## 2021-01-18 RX ORDER — POTASSIUM CHLORIDE 20 MEQ
40 PACKET (EA) ORAL ONCE
Refills: 0 | Status: COMPLETED | OUTPATIENT
Start: 2021-01-18 | End: 2021-01-18

## 2021-01-18 RX ORDER — SENNA PLUS 8.6 MG/1
2 TABLET ORAL AT BEDTIME
Refills: 0 | Status: DISCONTINUED | OUTPATIENT
Start: 2021-01-18 | End: 2021-02-02

## 2021-01-18 RX ORDER — BUMETANIDE 0.25 MG/ML
4 INJECTION INTRAMUSCULAR; INTRAVENOUS EVERY 8 HOURS
Refills: 0 | Status: DISCONTINUED | OUTPATIENT
Start: 2021-01-18 | End: 2021-01-18

## 2021-01-18 RX ORDER — INSULIN GLARGINE 100 [IU]/ML
46 INJECTION, SOLUTION SUBCUTANEOUS AT BEDTIME
Refills: 0 | Status: DISCONTINUED | OUTPATIENT
Start: 2021-01-18 | End: 2021-01-19

## 2021-01-18 RX ADMIN — Medication 20 UNIT(S): at 16:38

## 2021-01-18 RX ADMIN — Medication 5: at 11:44

## 2021-01-18 RX ADMIN — Medication 1 APPLICATION(S): at 06:03

## 2021-01-18 RX ADMIN — Medication 25 MILLIGRAM(S): at 13:11

## 2021-01-18 RX ADMIN — Medication 25 MILLIGRAM(S): at 08:28

## 2021-01-18 RX ADMIN — Medication 25 MILLIGRAM(S): at 21:10

## 2021-01-18 RX ADMIN — Medication 3 MILLIGRAM(S): at 21:09

## 2021-01-18 RX ADMIN — Medication 40 MILLIEQUIVALENT(S): at 08:26

## 2021-01-18 RX ADMIN — ATORVASTATIN CALCIUM 80 MILLIGRAM(S): 80 TABLET, FILM COATED ORAL at 21:09

## 2021-01-18 RX ADMIN — BUMETANIDE 132 MILLIGRAM(S): 0.25 INJECTION INTRAMUSCULAR; INTRAVENOUS at 06:18

## 2021-01-18 RX ADMIN — Medication 18 UNIT(S): at 07:39

## 2021-01-18 RX ADMIN — Medication 2: at 07:38

## 2021-01-18 RX ADMIN — HEPARIN SODIUM 5000 UNIT(S): 5000 INJECTION INTRAVENOUS; SUBCUTANEOUS at 13:11

## 2021-01-18 RX ADMIN — Medication 5: at 16:38

## 2021-01-18 RX ADMIN — Medication 40 MILLIEQUIVALENT(S): at 11:32

## 2021-01-18 RX ADMIN — CLOPIDOGREL BISULFATE 75 MILLIGRAM(S): 75 TABLET, FILM COATED ORAL at 11:32

## 2021-01-18 RX ADMIN — Medication 81 MILLIGRAM(S): at 11:31

## 2021-01-18 RX ADMIN — HEPARIN SODIUM 5000 UNIT(S): 5000 INJECTION INTRAVENOUS; SUBCUTANEOUS at 06:03

## 2021-01-18 RX ADMIN — CALAMINE AND ZINC OXIDE AND PHENOL 1 APPLICATION(S): 160; 10 LOTION TOPICAL at 13:11

## 2021-01-18 RX ADMIN — CALAMINE AND ZINC OXIDE AND PHENOL 1 APPLICATION(S): 160; 10 LOTION TOPICAL at 21:09

## 2021-01-18 RX ADMIN — HEPARIN SODIUM 5000 UNIT(S): 5000 INJECTION INTRAVENOUS; SUBCUTANEOUS at 21:08

## 2021-01-18 RX ADMIN — Medication 20 UNIT(S): at 11:45

## 2021-01-18 RX ADMIN — Medication 1 APPLICATION(S): at 16:40

## 2021-01-18 RX ADMIN — INSULIN GLARGINE 46 UNIT(S): 100 INJECTION, SOLUTION SUBCUTANEOUS at 21:06

## 2021-01-18 RX ADMIN — Medication 25 MILLIGRAM(S): at 21:09

## 2021-01-18 RX ADMIN — Medication 25 MICROGRAM(S): at 21:08

## 2021-01-18 RX ADMIN — Medication 12.5 MILLIGRAM(S): at 06:03

## 2021-01-18 RX ADMIN — CALAMINE AND ZINC OXIDE AND PHENOL 1 APPLICATION(S): 160; 10 LOTION TOPICAL at 06:03

## 2021-01-18 RX ADMIN — Medication 4: at 21:06

## 2021-01-18 NOTE — PROGRESS NOTE ADULT - SUBJECTIVE AND OBJECTIVE BOX
Interval History:  feels slightly better  continues to be SOB  renal function worsening    Medications:  aspirin enteric coated 81 milliGRAM(s) Oral daily  atorvastatin 80 milliGRAM(s) Oral at bedtime  calamine/zinc oxide Lotion 1 Application(s) Topical three times a day  clobetasol 0.05% Cream 1 Application(s) Topical every 12 hours  clopidogrel Tablet 75 milliGRAM(s) Oral daily  dextrose 40% Gel 15 Gram(s) Oral once  dextrose 5%. 1000 milliLiter(s) IV Continuous <Continuous>  dextrose 5%. 1000 milliLiter(s) IV Continuous <Continuous>  dextrose 50% Injectable 25 Gram(s) IV Push once  dextrose 50% Injectable 12.5 Gram(s) IV Push once  dextrose 50% Injectable 25 Gram(s) IV Push once  glucagon  Injectable 1 milliGRAM(s) IntraMuscular once  heparin   Injectable 5000 Unit(s) SubCutaneous every 8 hours  hydrALAZINE 25 milliGRAM(s) Oral every 8 hours  insulin glargine Injectable (LANTUS) 46 Unit(s) SubCutaneous at bedtime  insulin lispro (ADMELOG) corrective regimen sliding scale   SubCutaneous three times a day before meals  insulin lispro (ADMELOG) corrective regimen sliding scale   SubCutaneous at bedtime  insulin lispro Injectable (ADMELOG) 20 Unit(s) SubCutaneous three times a day before meals  levothyroxine Injectable 25 MICROGram(s) IV Push at bedtime  melatonin 3 milliGRAM(s) Oral at bedtime  metoprolol tartrate 25 milliGRAM(s) Oral every 8 hours  senna 2 Tablet(s) Oral at bedtime      Vitals:  T(C): 36.5 (21 @ 20:04), Max: 36.7 (21 @ 11:49)  HR: 84 (21 @ 20:04) (78 - 84)  BP: 106/62 (21 @ 20:04) (106/62 - 114/69)  BP(mean): --  RR: 19 (21 @ 20:04) (18 - 19)  SpO2: 99% (21 @ 20:04) (94% - 100%)    Daily     Daily Weight in k.3 (2021 08:53)        I&O's Summary    2021 07:01  -  2021 07:00  --------------------------------------------------------  IN: 660 mL / OUT: 1900 mL / NET: -1240 mL    2021 07:01  -  2021 20:16  --------------------------------------------------------  IN: 700 mL / OUT: 150 mL / NET: 550 mL    Physical Exam:  Appearance: No Acute Distress; frail   HEENT: PERRL  Neck: JVD approx 6 cm with mild HJR  Cardiovascular: Normal S1 S2, No murmurs/rubs/gallops  Respiratory: Clear to auscultation bilaterally  Gastrointestinal: Soft, Non-tender	  Skin: No cyanosis	  Neurologic: Non-focal  Extremities: No LE edema; [facility]  Psychiatry: A & O x 3, Mood & affect appropriate    Labs:                        10.8   8.90  )-----------( 406      ( 2021 06:34 )             35.6     -18    131<L>  |  91<L>  |  107<H>  ----------------------------<  422<H>  4.0   |  20<L>  |  2.54<H>    Ca    9.7      2021 18:03  Phos  5.3       Mg     2.3         TPro  8.0  /  Alb  3.9  /  TBili  0.3  /  DBili  x   /  AST  38  /  ALT  24  /  AlkPhos  105

## 2021-01-18 NOTE — PROGRESS NOTE ADULT - PROBLEM SELECTOR PLAN 4
Most likely due to LV failure   -C/w Bumex 4 mg BID   -F/u with repeat TTE Most likely due to LV failure   -C/w Bumex 4 mg BID   -repeat TTE shows mild pulm HTN w/ normal RV function

## 2021-01-18 NOTE — PROGRESS NOTE ADULT - PROBLEM SELECTOR PLAN 1
Patient describes increased work of breathing and tachypnea, does not appear to be hypercarbic or hypoxic. However, CXR shows Bilateral predominantly lower lobe hazy opacities with elevated pro-BNP 5178  -Most likely due to worsening HF   -Started bumex 4 mg BID   - C/w metoprolol 12.5 mg BID. No ACE-i or ARBs at home    - TTE showed EF 20%  - Cardiology consulted for GDMT, see below  - Low suspicion for PNA. No clinical signs of PNA.   - Blood cx and urine cx are negative   - Monitor off abx for now   - CT chest showed no focal consolidation Patient describes increased work of breathing and tachypnea, does not appear to be hypercarbic or hypoxic. However, CXR shows Bilateral predominantly lower lobe hazy opacities with elevated pro-BNP 5178  -Most likely due to worsening HF   -c/w bumex 4 TID per HF team  - C/w metoprolol 12.5 mg BID. No ACE-i or ARBs at home    - TTE showed EF 20%  - Cardiology consulted for GDMT, see below  - Low suspicion for PNA. No clinical signs of PNA.   - Blood cx and urine cx are negative   - Monitor off abx for now   - CT chest showed no focal consolidation

## 2021-01-18 NOTE — PROGRESS NOTE ADULT - ASSESSMENT
76 yo F w/ PMH of CKD stage 4 (baseline Cr 1.9-2.2) HTN, T2DM, CAD s/p CABG X3 (2014 at [facility]), non-dilated ICM (EF 20-25%), severe mitral regurgitation s/p mitral clip (6/13/19), severe pulm HTN, hypothyroidism who presented for evaluation of SOB and was admitted for ADHF.    CKD stage 4  - baseline Cr 1.9-2.2; has had recurrent MERVIN's over the years  - CKD is likely 2/2 recurrent MERVIN's + underlying DM/HTN  - Scr uptrending likely sec to uncontrolled DM and diuretics -- OPtimize Glucose control  - Continue Diuretics per heart failure team   - renal sonogram in the past with simple renal cyst  - Avoid nephrotoxins including NSAIDs, IV contrast (if possible), [employer] products  - monitor I/O's accurately    HTN  BP controlled  Low salt diet   MOnitor BP        Proteinuria/Hematuria  - likely from underlying DM  - has 1.8 grams proteinuria  - Hep B, Hep C, HIV RF, C3, C4, p-ANCA, c-ANCA, RPR neg  - weak gamma-migrating protein, weak IgG lambda protein band noted in the past. Pt to follow w/ heme   - DEAN 1:320 positive  - RPR neg, FTA +

## 2021-01-18 NOTE — PROGRESS NOTE ADULT - NUTRITIONAL ASSESSMENT
Briefly, 72 yo F with HTN, HLD, DM2 (A1c 7.5 10/19), CAD s/p CABG (LIMA to LAD, SVG to OM, SVG to PDA) and prior PCI, ICM HFrEF (EF 20-25%, LVEDD 4.7 cm), severe mitral regurgitation s/p mitral clip (6/19; mean MV gradient 7-8), severe pulmonary HTN, CKD IV (b/l Cr 1.9-2.2), hypothyroidism who BIBEMS 2/2 worsening work of breathing presents in acute on chronic systolic CHF with NYHA IV functional status. Was notably on midodrine as outpatient since discharge from [facility] in 10/19. Has been having increased work of breathing at home with failure to thrive. Initially required Bipap and was given bumex with some improvement. Continues to be dyspneic. On exam, frail appearing, JVP approx 8-10 with HJR with resp variation, RRR, no m/r/g, mild crackles b/l, nontender abdomen, no pedal edema. Labs reviewed - BUN/Cr 107/2.54 (worsening), BNP 5k. TTE reviewed 1/13 -  severe LV dysfunction, LVEDD 4.8 cm, mild-mod MR, IVC normal size. Etiology of her symptoms may be secondary to degree of mitral stenosis given HR in 80-90s with mean gradient of 8 on TTE and appears fairly euvolemic. Unlikely low output as BP in 110-120 and warm on exam.
Briefly, 74 yo F with HTN, HLD, DM2 (A1c 7.5 10/19), CAD s/p CABG (LIMA to LAD, SVG to OM, SVG to PDA) and prior PCI, ICM HFrEF (EF 20-25%, LVEDD 4.7 cm), severe mitral regurgitation s/p mitral clip (6/19; mean MV gradient 7-8), severe pulmonary HTN, CKD IV (b/l Cr 1.9-2.2), hypothyroidism who BIBEMS 2/2 worsening work of breathing presents in acute on chronic systolic CHF with NYHA IV functional status. Was notably on midodrine as outpatient since discharge from [facility] in 10/19. Has been having increased work of breathing at home with failure to thrive. Initially required Bipap and was givne bumex with some improvement. Continues to be dyspneic. On exam, frail appearing, JVP approx 8-10 with HJR with resp variation, RRR, no m/r/g, mild crackles b/l, nontender abdomen, no pedal edema. Labs reviewed - BUN/Cr 60/2 (near baseline), BNP 5k. TTE reviewed -  severe LV dysfunction, LVEDD 4.8 cm, mild-mod MR, IVC normal size.
Briefly, 74 yo F with HTN, HLD, DM2 (A1c 7.5 10/19), CAD s/p CABG (LIMA to LAD, SVG to OM, SVG to PDA) and prior PCI, ICM HFrEF (EF 20-25%, LVEDD 4.7 cm), severe mitral regurgitation s/p mitral clip (6/19; mean MV gradient 7-8), severe pulmonary HTN, CKD IV (b/l Cr 1.9-2.2), hypothyroidism who BIBEMS 2/2 worsening work of breathing presents in acute on chronic systolic CHF with NYHA IV functional status. Was notably on midodrine as outpatient since discharge from [facility] in 10/19. Has been having increased work of breathing at home with failure to thrive. Initially required Bipap and was given bumex with some improvement. Continues to be dyspneic. On exam, frail appearing, JVP approx 8-10 with HJR with resp variation, RRR, no m/r/g, mild crackles b/l, nontender abdomen, no pedal edema. Labs reviewed - BUN/Cr 60/2 (near baseline), BNP 5k. TTE reviewed 1/13 -  severe LV dysfunction, LVEDD 4.8 cm, mild-mod MR, IVC normal size.

## 2021-01-18 NOTE — PROGRESS NOTE ADULT - ATTENDING COMMENTS
Acute on chronic systolic heart failure- cont Bumex 4 mg IV BID, metoprolol, hydralazine- may require inotrope   Type 2 DM- adjusting Lantus/Admelog for better glycemic control Acute on chronic systolic heart failure- Bumex increased to 4 mg IV Q8- cont metoprolol, hydralazine- may require inotrope   Type 2 DM- adjusting Lantus/Admelog for better glycemic control

## 2021-01-18 NOTE — PROGRESS NOTE ADULT - PROBLEM SELECTOR PLAN 2
s/p clip; has component of mitral stenosis (mean gradient 8-9); will be impt to keep HR controlled  - repeat TTE tomorrow

## 2021-01-18 NOTE — PROGRESS NOTE ADULT - ASSESSMENT
75y.o F Bulgarian speaking h/o HTN, HLD, DM, CAD s/p CABG 2014 and prior PCI, severe mitral regurgitation (s/p mitral clip 2019), pulmonary HTN (TTE 2019), HF (EF 21 % June 2019), CKD IV not on dialysis (b/l SCr 2-2.2), hypothyroidism BIBEMS 2/2 worsening work of breathing, found to have crackles, elevated proBNP and b/l pulmonary edema on CXR c/w acute exacerbation of CHF.

## 2021-01-18 NOTE — PROGRESS NOTE ADULT - PROBLEM SELECTOR PLAN 1
c/w hydral 25 mg q8h  c/w lopressor for now; increase to 25 mg q8h; goal HR 60-70  on bumex 4 mg q12; hold for now  - repeat TTE tomorrow

## 2021-01-18 NOTE — PROGRESS NOTE ADULT - SUBJECTIVE AND OBJECTIVE BOX
[facility] NEPHROLOGY PRACT[facility]   MD RAHEEL SHAH MD RUORU WONG, PA    TEL:  OFFICE: [phone]  DR SANTOYO CELL: [phone]  JUSTEN KENDALL CELL: [phone]  DR. NGUYEN CELL: [phone]  DR. RIDER CELL: [phone]    FROM 5 PM - 7 AM PLEASE CALL ANSWERING SERVICE: [phone]    RENAL FOLLOW UP NOTE--Date of Service 01-18-21 @ 09:44  --------------------------------------------------------------------------------  HPI:      Pt seen and examined at bedside.   + SOB    PAST HISTORY  --------------------------------------------------------------------------------  No significant changes to PMH, PSH, FHx, SHx, unless otherwise noted    ALLERGIES & MEDICATIONS  --------------------------------------------------------------------------------  Allergies    azithromycin (Hives; Pruritus)    Intolerances      Standing Inpatient Medications  aspirin enteric coated 81 milliGRAM(s) Oral daily  atorvastatin 80 milliGRAM(s) Oral at bedtime  buMETAnide IVPB 4 milliGRAM(s) IV Intermittent every 8 hours  calamine/zinc oxide Lotion 1 Application(s) Topical three times a day  clobetasol 0.05% Cream 1 Application(s) Topical every 12 hours  clopidogrel Tablet 75 milliGRAM(s) Oral daily  dextrose 40% Gel 15 Gram(s) Oral once  dextrose 5%. 1000 milliLiter(s) IV Continuous <Continuous>  dextrose 5%. 1000 milliLiter(s) IV Continuous <Continuous>  dextrose 50% Injectable 25 Gram(s) IV Push once  dextrose 50% Injectable 12.5 Gram(s) IV Push once  dextrose 50% Injectable 25 Gram(s) IV Push once  glucagon  Injectable 1 milliGRAM(s) IntraMuscular once  heparin   Injectable 5000 Unit(s) SubCutaneous every 8 hours  hydrALAZINE 25 milliGRAM(s) Oral every 8 hours  insulin glargine Injectable (LANTUS) 46 Unit(s) SubCutaneous at bedtime  insulin lispro (ADMELOG) corrective regimen sliding scale   SubCutaneous three times a day before meals  insulin lispro (ADMELOG) corrective regimen sliding scale   SubCutaneous at bedtime  insulin lispro Injectable (ADMELOG) 20 Unit(s) SubCutaneous three times a day before meals  levothyroxine Injectable 25 MICROGram(s) IV Push at bedtime  melatonin 3 milliGRAM(s) Oral at bedtime  metoprolol tartrate 12.5 milliGRAM(s) Oral every 12 hours  potassium chloride    Tablet ER 40 milliEquivalent(s) Oral once    PRN Inpatient Medications      REVIEW OF SYSTEMS  --------------------------------------------------------------------------------  General: no fever  CVS: no chest pain  RESP: + sob, no cough  ABD: no abdominal pain  : no dysuria,  MSK: no edema     VITALS/PHYSICAL EXAM  --------------------------------------------------------------------------------  T(C): 36.5 (01-18-21 @ 04:12), Max: 37.1 (01-17-21 @ 19:57)  HR: 80 (01-18-21 @ 06:00) (77 - 90)  BP: 106/68 (01-18-21 @ 06:00) (103/61 - 120/72)  RR: 18 (01-18-21 @ 04:12) (18 - 18)  SpO2: 100% (01-18-21 @ 04:12) (100% - 100%)  Wt(kg): --        01-17-21 @ 07:01  -  01-18-21 @ 07:00  --------------------------------------------------------  IN: 660 mL / OUT: 1900 mL / NET: -1240 mL      Physical Exam:  	Gen: NAD  	HEENT: MMM  	Pulm: decreased breath sounds  B/L  	CV: S1S2  	Abd: Soft, +BS  	Ext: No LE edema B/L                      Neuro: Awake   	Skin: Warm and Dry   	Vascular access: no HD catheter           no  cuellar  LABS/STUDIES  --------------------------------------------------------------------------------              10.8   8.90  >-----------<  406      [01-18-21 @ 06:34]              35.6     136  |  93  |  98  ----------------------------<  246      [01-18-21 @ 06:34]  3.4   |  24  |  2.39        Ca     9.7     [01-18-21 @ 06:34]      Mg     2.3     [01-18-21 @ 06:34]      Phos  5.3     [01-18-21 @ 06:34]    TPro  8.6  /  Alb  3.9  /  TBili  0.5  /  DBili  x   /  AST  25  /  ALT  21  /  AlkPhos  110  [01-17-21 @ 18:07]          Creatinine Trend:  SCr 2.39 [01-18 @ 06:34]  SCr 2.22 [01-17 @ 18:07]  SCr 2.07 [01-17 @ 07:02]  SCr 2.07 [01-16 @ 17:41]  SCr 1.87 [01-16 @ 06:40]    Urinalysis - [01-12-21 @ 19:28]      Color Light Yellow / Appearance Clear / SG 1.011 / pH 6.0      Gluc Negative / Ketone Negative  / Bili Negative / Urobili Negative       Blood Negative / Protein 30 mg/dL / Leuk Est Moderate / Nitrite Negative      RBC 1 / WBC 5 / Hyaline 4 / Gran  / Sq Epi  / Non Sq Epi 1 / Bacteria Negative      Ferritin 111      [01-13-21 @ 01:42]  HbA1c 7.5      [10-08-19 @ 14:30]  TSH 2.15      [01-13-21 @ 10:07]

## 2021-01-18 NOTE — PROGRESS NOTE ADULT - SUBJECTIVE AND OBJECTIVE BOX
CARDIOLOGY ATTENDING    tele: NSR, no events    still reporting dyspnea, and Cr rising    she denies palpitations, syncope, nor angina, ROS otherwise -          Review of Systems:   Constitutional: [ ] fevers, [ ] chills.   Skin: [ ] dry skin. [ ] rashes.  Psychiatric: [ ] depression, [ ] anxiety.   Gastrointestinal: [ ] BRBPR, [ ] melena.   Neurological: [ ] confusion. [ ] seizures. [ ] shuffling gait.   Ears,Nose,Mouth and Throat: [ ] ear pain [ ] sore throat.   Eyes: [ ] diplopia.   Respiratory: [ ] hemoptysis. [ ] shortness of breath  Cardiovascular: See HPI above  Hematologic/Lymphatic: [ ] anemia. [ ] painful nodes. [ ] prolonged bleeding.   Genitourinary: [ ] hematuria. [ ] flank pain.   Endocrine: [ ] significant change in weight. [ ] intolerance to heat and cold.     Review of systems [ x] otherwise negative, [ ] otherwise unable to obtain    FH: no family history of sudden cardiac death in first degree relatives    SH: [ ] tobacco, [ ] alcohol, [ ] drugs    aspirin enteric coated 81 milliGRAM(s) Oral daily  atorvastatin 80 milliGRAM(s) Oral at bedtime  buMETAnide IVPB 4 milliGRAM(s) IV Intermittent every 8 hours  calamine/zinc oxide Lotion 1 Application(s) Topical three times a day  clobetasol 0.05% Cream 1 Application(s) Topical every 12 hours  clopidogrel Tablet 75 milliGRAM(s) Oral daily  dextrose 40% Gel 15 Gram(s) Oral once  dextrose 5%. 1000 milliLiter(s) IV Continuous <Continuous>  dextrose 5%. 1000 milliLiter(s) IV Continuous <Continuous>  dextrose 50% Injectable 25 Gram(s) IV Push once  dextrose 50% Injectable 12.5 Gram(s) IV Push once  dextrose 50% Injectable 25 Gram(s) IV Push once  glucagon  Injectable 1 milliGRAM(s) IntraMuscular once  heparin   Injectable 5000 Unit(s) SubCutaneous every 8 hours  hydrALAZINE 25 milliGRAM(s) Oral every 8 hours  insulin glargine Injectable (LANTUS) 46 Unit(s) SubCutaneous at bedtime  insulin lispro (ADMELOG) corrective regimen sliding scale   SubCutaneous three times a day before meals  insulin lispro (ADMELOG) corrective regimen sliding scale   SubCutaneous at bedtime  insulin lispro Injectable (ADMELOG) 20 Unit(s) SubCutaneous three times a day before meals  levothyroxine Injectable 25 MICROGram(s) IV Push at bedtime  melatonin 3 milliGRAM(s) Oral at bedtime  metoprolol tartrate 12.5 milliGRAM(s) Oral every 12 hours  potassium chloride    Tablet ER 40 milliEquivalent(s) Oral once  senna 2 Tablet(s) Oral at bedtime                            10.8   8.90  )-----------( 406      ( 18 Jan 2021 06:34 )             35.6       01-18    136  |  93<L>  |  98<H>  ----------------------------<  246<H>  3.4<L>   |  24  |  2.39<H>    Ca    9.7      18 Jan 2021 06:34  Phos  5.3     01-18  Mg     2.3     01-18    TPro  8.6<H>  /  Alb  3.9  /  TBili  0.5  /  DBili  x   /  AST  25  /  ALT  21  /  AlkPhos  110  01-17            T(C): 36.5 (01-18-21 @ 04:12), Max: 37.1 (01-17-21 @ 19:57)  HR: 80 (01-18-21 @ 06:00) (77 - 90)  BP: 106/68 (01-18-21 @ 06:00) (103/61 - 120/72)  RR: 18 (01-18-21 @ 04:12) (18 - 18)  SpO2: 100% (01-18-21 @ 04:12) (100% - 100%)  Wt(kg): --    I&O's Summary    17 Jan 2021 07:01  -  18 Jan 2021 07:00  --------------------------------------------------------  IN: 660 mL / OUT: 1900 mL / NET: -1240 mL    18 Jan 2021 07:01  -  18 Jan 2021 11:28  --------------------------------------------------------  IN: 220 mL / OUT: 0 mL / NET: 220 mL        General: Well nourished in no acute distress. Alert and Oriented * 3.   Head: Normocephalic and atraumatic.   Neck: JVP 10. No bruits. Supple. Does not appear to be enlarged.   Cardiovascular: + S1,S2 ; RRR Soft systolic murmur at the left lower sternal border. No rubs noted.    Lungs: CTA b/l. No rhonchi, rales or wheezes.   Abdomen: + BS, soft. Non tender. Non distended. No rebound. No guarding.   Extremities: No clubbing/cyanosis/edema.   Neurologic: Moves all four extremities. Full range of motion.   Skin: Warm and moist. The patient's skin has normal elasticity and good skin turgor.   Psychiatric: Appropriate mood and affect.  Musculoskeletal: Normal range of motion, normal strength      A/P) 76 y/o female PMH HTN, HLD, DM, CAD s/p CABG 2014 and prior PCI, severe mitral regurgitation (s/p mitral clip 2019), pulmonary HTN, HF (EF 21 % June 2019), CKD IV not on dialysis (b/l SCr 2-2.2), hypothyroidism a/w acute on chronic systolic CHF with NYHA IV functional status.    -continue IV diuresis  -f/u with CHF today - patient likely to benefit from starting dobutamine  -May consider RHC at some point on this admission, once closer to euvolemic.  -Rec vascular consult given decreased LLE pulses and foot pain      Ayala Cook M.D., [facility]  Cardiac Electrophysiology  [facility]  [address] [facility], [address] [place] [address]  [email or web address]    office [phone]  pager [phone]

## 2021-01-18 NOTE — PROGRESS NOTE ADULT - SUBJECTIVE AND OBJECTIVE BOX
Jacquie Amos MD  Internal Medicine, PGY-3  [phone]    Patient is a 75y old  Female who presents with a chief complaint of increased work of breathing (17 Jan 2021 16:12)      SUBJECTIVE / OVERNIGHT EVENTS:      REVIEW OF SYSTEMS:    CONSTITUTIONAL: No weakness, fevers or chills  EYES: No visual changes; No blurry vision  ENT:  No pain or stiffness; No vertigo or throat pain  RESPIRATORY: No cough, wheezing, hemoptysis; No shortness of breath  CARDIOVASCULAR: No chest pain or palpitations  GASTROINTESTINAL: No abdominal or epigastric pain. No nausea, vomiting, or hematemesis; No diarrhea or constipation. No melena or hematochezia.  GENITOURINARY: No dysuria, frequency or hematuria  NEUROLOGICAL: No numbness, No HA  SKIN: No itching, rashes  MSK: no joint pain, no muscle pain  MEDICATIONS  (STANDING):  aspirin enteric coated 81 milliGRAM(s) Oral daily  atorvastatin 80 milliGRAM(s) Oral at bedtime  calamine/zinc oxide Lotion 1 Application(s) Topical three times a day  clobetasol 0.05% Cream 1 Application(s) Topical every 12 hours  clopidogrel Tablet 75 milliGRAM(s) Oral daily  dextrose 40% Gel 15 Gram(s) Oral once  dextrose 5%. 1000 milliLiter(s) (50 mL/Hr) IV Continuous <Continuous>  dextrose 5%. 1000 milliLiter(s) (100 mL/Hr) IV Continuous <Continuous>  dextrose 50% Injectable 25 Gram(s) IV Push once  dextrose 50% Injectable 12.5 Gram(s) IV Push once  dextrose 50% Injectable 25 Gram(s) IV Push once  glucagon  Injectable 1 milliGRAM(s) IntraMuscular once  heparin   Injectable 5000 Unit(s) SubCutaneous every 8 hours  hydrALAZINE 25 milliGRAM(s) Oral every 8 hours  insulin glargine Injectable (LANTUS) 40 Unit(s) SubCutaneous at bedtime  insulin lispro (ADMELOG) corrective regimen sliding scale   SubCutaneous three times a day before meals  insulin lispro (ADMELOG) corrective regimen sliding scale   SubCutaneous at bedtime  insulin lispro Injectable (ADMELOG) 18 Unit(s) SubCutaneous three times a day before meals  levothyroxine Injectable 25 MICROGram(s) IV Push at bedtime  melatonin 3 milliGRAM(s) Oral at bedtime  metoprolol tartrate 12.5 milliGRAM(s) Oral every 12 hours    MEDICATIONS  (PRN):      CAPILLARY BLOOD GLUCOSE      POCT Blood Glucose.: 242 mg/dL (18 Jan 2021 07:23)  POCT Blood Glucose.: 328 mg/dL (17 Jan 2021 21:05)  POCT Blood Glucose.: 314 mg/dL (17 Jan 2021 16:23)  POCT Blood Glucose.: 274 mg/dL (17 Jan 2021 11:51)    I&O's Summary    17 Jan 2021 07:01  -  18 Jan 2021 07:00  --------------------------------------------------------  IN: 660 mL / OUT: 1900 mL / NET: -1240 mL        PHYSICAL EXAM:  Vital Signs Last 24 Hrs  T(C): 36.5 (18 Jan 2021 04:12), Max: 37.1 (17 Jan 2021 19:57)  T(F): 97.7 (18 Jan 2021 04:12), Max: 98.8 (17 Jan 2021 19:57)  HR: 80 (18 Jan 2021 06:00) (77 - 90)  BP: 106/68 (18 Jan 2021 06:00) (103/61 - 120/72)  BP(mean): --  RR: 18 (18 Jan 2021 04:12) (18 - 18)  SpO2: 100% (18 Jan 2021 04:12) (100% - 100%)    PHYSICAL EXAM:  GENERAL: NAD, well-groomed, well-developed  HEAD:  Atraumatic, Normocephalic  EYES: EOMI, PERRLA, conjunctiva and sclera clear  ENMT: No tonsillar erythema, exudates, or enlargement; Moist mucous membranes, Good dentition, No lesions  NECK: Supple, No JVD, Normal thyroid  CHEST/LUNG: Clear to ausculation bilaterally; No rales, rhonchi, wheezing, or rubs  HEART: Regular rate and rhythm; No murmurs, rubs, or gallops  ABDOMEN: Soft, Nontender, Nondistended; Bowel sounds present  EXTREMITIES:  2+ Peripheral Pulses, No clubbing, cyanosis, or edema  LYMPH: No lymphadenopathy noted  SKIN: No rashes or lesions  NERVOUS SYSTEM:  Alert & Oriented X3, Good concentration; Motor Strength 5/5 B/L upper and lower extremities; DTRs 2+ intact and symmetric      LABS:                        10.8   8.90  )-----------( 406      ( 18 Jan 2021 06:34 )             35.6     01-18    136  |  93<L>  |  98<H>  ----------------------------<  246<H>  3.4<L>   |  24  |  2.39<H>    Ca    9.7      18 Jan 2021 06:34  Phos  5.3     01-18  Mg     2.3     01-18    TPro  8.6<H>  /  Alb  3.9  /  TBili  0.5  /  DBili  x   /  AST  25  /  ALT  21  /  AlkPhos  110  01-17              Procalcitonin, Serum: 0.14 (01-12)    C-Reactive Protein, Serum: 0.63 (01-12)    Ferritin, Serum: 111 (01-13)      D-Dimer Assay, Quantitative: 433 (01-12)    COVID-19 PCR: NotDetec (01-12-21 @ 21:56)      RADIOLOGY & ADDITIONAL TESTS:  Results Reviewed:   Imaging Personally Reviewed:  Electrocardiogram Personally Reviewed:    COORDINATION OF CARE:  Care Discussed with Consultants/Other Providers [Y/N]:  Prior or Outpatient Records Reviewed [Y/N]:   Jacquie Amos MD  Internal Medicine, PGY-3  [phone]    Patient is a 75y old  Female who presents with a chief complaint of increased work of breathing (17 Jan 2021 16:12)      SUBJECTIVE / OVERNIGHT EVENTS: Pt reports feeling weak.  Reports chest pressure. Breathing has improved. No BM since admission    REVIEW OF SYSTEMS:    CONSTITUTIONAL: No fevers or chills    CARDIOVASCULAR: No chest pain or palpitations  GASTROINTESTINAL: No abdominal pain, no N/V  GENITOURINARY: No dysuria, frequency or hematuria  NEUROLOGICAL: No numbness, No HA  SKIN: No itching, rashes  MSK: no joint pain, no muscle pain    MEDICATIONS  (STANDING):  aspirin enteric coated 81 milliGRAM(s) Oral daily  atorvastatin 80 milliGRAM(s) Oral at bedtime  calamine/zinc oxide Lotion 1 Application(s) Topical three times a day  clobetasol 0.05% Cream 1 Application(s) Topical every 12 hours  clopidogrel Tablet 75 milliGRAM(s) Oral daily  dextrose 40% Gel 15 Gram(s) Oral once  dextrose 5%. 1000 milliLiter(s) (50 mL/Hr) IV Continuous <Continuous>  dextrose 5%. 1000 milliLiter(s) (100 mL/Hr) IV Continuous <Continuous>  dextrose 50% Injectable 25 Gram(s) IV Push once  dextrose 50% Injectable 12.5 Gram(s) IV Push once  dextrose 50% Injectable 25 Gram(s) IV Push once  glucagon  Injectable 1 milliGRAM(s) IntraMuscular once  heparin   Injectable 5000 Unit(s) SubCutaneous every 8 hours  hydrALAZINE 25 milliGRAM(s) Oral every 8 hours  insulin glargine Injectable (LANTUS) 40 Unit(s) SubCutaneous at bedtime  insulin lispro (ADMELOG) corrective regimen sliding scale   SubCutaneous three times a day before meals  insulin lispro (ADMELOG) corrective regimen sliding scale   SubCutaneous at bedtime  insulin lispro Injectable (ADMELOG) 18 Unit(s) SubCutaneous three times a day before meals  levothyroxine Injectable 25 MICROGram(s) IV Push at bedtime  melatonin 3 milliGRAM(s) Oral at bedtime  metoprolol tartrate 12.5 milliGRAM(s) Oral every 12 hours    MEDICATIONS  (PRN):      CAPILLARY BLOOD GLUCOSE      POCT Blood Glucose.: 242 mg/dL (18 Jan 2021 07:23)  POCT Blood Glucose.: 328 mg/dL (17 Jan 2021 21:05)  POCT Blood Glucose.: 314 mg/dL (17 Jan 2021 16:23)  POCT Blood Glucose.: 274 mg/dL (17 Jan 2021 11:51)    I&O's Summary    17 Jan 2021 07:01  -  18 Jan 2021 07:00  --------------------------------------------------------  IN: 660 mL / OUT: 1900 mL / NET: -1240 mL        PHYSICAL EXAM:  Vital Signs Last 24 Hrs  T(C): 36.5 (18 Jan 2021 04:12), Max: 37.1 (17 Jan 2021 19:57)  T(F): 97.7 (18 Jan 2021 04:12), Max: 98.8 (17 Jan 2021 19:57)  HR: 80 (18 Jan 2021 06:00) (77 - 90)  BP: 106/68 (18 Jan 2021 06:00) (103/61 - 120/72)  BP(mean): --  RR: 18 (18 Jan 2021 04:12) (18 - 18)  SpO2: 100% (18 Jan 2021 04:12) (100% - 100%)    PHYSICAL EXAM:  GENERAL: NAD  HEAD:  Atraumatic, Normocephalic  EYES: EOMI, PERRLA, conjunctiva and sclera clear  CHEST/LUNG: bibasilar crackles, no wheeze  HEART: Regular rate and rhythm; No murmur  ABDOMEN: Soft, Nontender, Nondistended; Bowel sounds present  EXTREMITIES: no edema  LYMPH: No lymphadenopathy noted  SKIN: No rashes or lesions  NERVOUS SYSTEM:  Alert & Oriented X3, Good concentration; no focal deficits.    LABS:                        10.8   8.90  )-----------( 406      ( 18 Jan 2021 06:34 )             35.6     01-18    136  |  93<L>  |  98<H>  ----------------------------<  246<H>  3.4<L>   |  24  |  2.39<H>    Ca    9.7      18 Jan 2021 06:34  Phos  5.3     01-18  Mg     2.3     01-18    TPro  8.6<H>  /  Alb  3.9  /  TBili  0.5  /  DBili  x   /  AST  25  /  ALT  21  /  AlkPhos  110  01-17              Procalcitonin, Serum: 0.14 (01-12)    C-Reactive Protein, Serum: 0.63 (01-12)    Ferritin, Serum: 111 (01-13)      D-Dimer Assay, Quantitative: 433 (01-12)    COVID-19 PCR: [place] (01-12-21 @ 21:56)

## 2021-01-18 NOTE — PROGRESS NOTE ADULT - PROBLEM SELECTOR PLAN 5
-Takes 60 units of lantus at bedtime and 18 units of humalog   -C/w lantus 25 units of lantus at bedtime   -C/w 10 units admelog premeal -Takes 60 units of lantus at bedtime and 18 units of humalog   - increased Lantus and Admelog based on sliding scale.  Will continue to titrate to achieve BS goal 140-180

## 2021-01-19 ENCOUNTER — TRANSCRIPTION ENCOUNTER (OUTPATIENT)
Age: 76
End: 2021-01-19

## 2021-01-19 DIAGNOSIS — J96.01 ACUTE RESPIRATORY FAILURE WITH HYPOXIA: ICD-10-CM

## 2021-01-19 LAB
ALBUMIN SERPL ELPH-MCNC: 3.9 G/DL — SIGNIFICANT CHANGE UP (ref 3.3–5)
ALP SERPL-CCNC: 103 U/L — SIGNIFICANT CHANGE UP (ref 40–120)
ALT FLD-CCNC: 26 U/L — SIGNIFICANT CHANGE UP (ref 10–45)
ANION GAP SERPL CALC-SCNC: 18 MMOL/L — HIGH (ref 5–17)
AST SERPL-CCNC: 36 U/L — SIGNIFICANT CHANGE UP (ref 10–40)
BILIRUB SERPL-MCNC: 0.4 MG/DL — SIGNIFICANT CHANGE UP (ref 0.2–1.2)
BUN SERPL-MCNC: 108 MG/DL — HIGH (ref 7–23)
CALCIUM SERPL-MCNC: 10.2 MG/DL — SIGNIFICANT CHANGE UP (ref 8.4–10.5)
CHLORIDE SERPL-SCNC: 96 MMOL/L — SIGNIFICANT CHANGE UP (ref 96–108)
CO2 SERPL-SCNC: 20 MMOL/L — LOW (ref 22–31)
CREAT SERPL-MCNC: 2.47 MG/DL — HIGH (ref 0.5–1.3)
GLUCOSE BLDC GLUCOMTR-MCNC: 274 MG/DL — HIGH (ref 70–99)
GLUCOSE BLDC GLUCOMTR-MCNC: 326 MG/DL — HIGH (ref 70–99)
GLUCOSE BLDC GLUCOMTR-MCNC: 354 MG/DL — HIGH (ref 70–99)
GLUCOSE BLDC GLUCOMTR-MCNC: 376 MG/DL — HIGH (ref 70–99)
GLUCOSE SERPL-MCNC: 255 MG/DL — HIGH (ref 70–99)
POTASSIUM SERPL-MCNC: 4.1 MMOL/L — SIGNIFICANT CHANGE UP (ref 3.5–5.3)
POTASSIUM SERPL-SCNC: 4.1 MMOL/L — SIGNIFICANT CHANGE UP (ref 3.5–5.3)
PROT SERPL-MCNC: 8.3 G/DL — SIGNIFICANT CHANGE UP (ref 6–8.3)
SODIUM SERPL-SCNC: 134 MMOL/L — LOW (ref 135–145)

## 2021-01-19 PROCEDURE — 99233 SBSQ HOSP IP/OBS HIGH 50: CPT

## 2021-01-19 PROCEDURE — 93308 TTE F-UP OR LMTD: CPT | Mod: 26

## 2021-01-19 PROCEDURE — 99233 SBSQ HOSP IP/OBS HIGH 50: CPT | Mod: GC

## 2021-01-19 PROCEDURE — 93321 DOPPLER ECHO F-UP/LMTD STD: CPT | Mod: 26

## 2021-01-19 RX ORDER — METOPROLOL TARTRATE 50 MG
37.5 TABLET ORAL EVERY 8 HOURS
Refills: 0 | Status: DISCONTINUED | OUTPATIENT
Start: 2021-01-19 | End: 2021-01-19

## 2021-01-19 RX ORDER — ISOSORBIDE DINITRATE 5 MG/1
10 TABLET ORAL EVERY 8 HOURS
Refills: 0 | Status: DISCONTINUED | OUTPATIENT
Start: 2021-01-20 | End: 2021-01-20

## 2021-01-19 RX ORDER — INSULIN GLARGINE 100 [IU]/ML
54 INJECTION, SOLUTION SUBCUTANEOUS AT BEDTIME
Refills: 0 | Status: DISCONTINUED | OUTPATIENT
Start: 2021-01-19 | End: 2021-01-19

## 2021-01-19 RX ORDER — INSULIN LISPRO 100/ML
VIAL (ML) SUBCUTANEOUS
Refills: 0 | Status: DISCONTINUED | OUTPATIENT
Start: 2021-01-19 | End: 2021-02-02

## 2021-01-19 RX ORDER — INSULIN GLARGINE 100 [IU]/ML
60 INJECTION, SOLUTION SUBCUTANEOUS AT BEDTIME
Refills: 0 | Status: DISCONTINUED | OUTPATIENT
Start: 2021-01-19 | End: 2021-01-20

## 2021-01-19 RX ORDER — METOPROLOL TARTRATE 50 MG
100 TABLET ORAL DAILY
Refills: 0 | Status: DISCONTINUED | OUTPATIENT
Start: 2021-01-20 | End: 2021-01-27

## 2021-01-19 RX ORDER — INSULIN LISPRO 100/ML
23 VIAL (ML) SUBCUTANEOUS
Refills: 0 | Status: DISCONTINUED | OUTPATIENT
Start: 2021-01-19 | End: 2021-01-20

## 2021-01-19 RX ORDER — INSULIN LISPRO 100/ML
VIAL (ML) SUBCUTANEOUS AT BEDTIME
Refills: 0 | Status: DISCONTINUED | OUTPATIENT
Start: 2021-01-19 | End: 2021-02-02

## 2021-01-19 RX ORDER — METOPROLOL TARTRATE 50 MG
37.5 TABLET ORAL ONCE
Refills: 0 | Status: COMPLETED | OUTPATIENT
Start: 2021-01-19 | End: 2021-01-19

## 2021-01-19 RX ADMIN — Medication 23 UNIT(S): at 11:55

## 2021-01-19 RX ADMIN — Medication 1 APPLICATION(S): at 05:08

## 2021-01-19 RX ADMIN — Medication 5: at 11:55

## 2021-01-19 RX ADMIN — Medication 6: at 21:23

## 2021-01-19 RX ADMIN — CALAMINE AND ZINC OXIDE AND PHENOL 1 APPLICATION(S): 160; 10 LOTION TOPICAL at 14:52

## 2021-01-19 RX ADMIN — Medication 81 MILLIGRAM(S): at 11:15

## 2021-01-19 RX ADMIN — HEPARIN SODIUM 5000 UNIT(S): 5000 INJECTION INTRAVENOUS; SUBCUTANEOUS at 05:07

## 2021-01-19 RX ADMIN — Medication 8: at 16:28

## 2021-01-19 RX ADMIN — ATORVASTATIN CALCIUM 80 MILLIGRAM(S): 80 TABLET, FILM COATED ORAL at 21:27

## 2021-01-19 RX ADMIN — Medication 25 MILLIGRAM(S): at 14:52

## 2021-01-19 RX ADMIN — HEPARIN SODIUM 5000 UNIT(S): 5000 INJECTION INTRAVENOUS; SUBCUTANEOUS at 21:32

## 2021-01-19 RX ADMIN — INSULIN GLARGINE 60 UNIT(S): 100 INJECTION, SOLUTION SUBCUTANEOUS at 21:21

## 2021-01-19 RX ADMIN — Medication 25 MILLIGRAM(S): at 05:08

## 2021-01-19 RX ADMIN — Medication 25 MILLIGRAM(S): at 21:32

## 2021-01-19 RX ADMIN — CALAMINE AND ZINC OXIDE AND PHENOL 1 APPLICATION(S): 160; 10 LOTION TOPICAL at 05:08

## 2021-01-19 RX ADMIN — Medication 3 MILLIGRAM(S): at 21:32

## 2021-01-19 RX ADMIN — Medication 37.5 MILLIGRAM(S): at 21:38

## 2021-01-19 RX ADMIN — CLOPIDOGREL BISULFATE 75 MILLIGRAM(S): 75 TABLET, FILM COATED ORAL at 11:16

## 2021-01-19 RX ADMIN — Medication 3: at 07:25

## 2021-01-19 RX ADMIN — HEPARIN SODIUM 5000 UNIT(S): 5000 INJECTION INTRAVENOUS; SUBCUTANEOUS at 14:52

## 2021-01-19 RX ADMIN — Medication 20 UNIT(S): at 07:25

## 2021-01-19 RX ADMIN — Medication 23 UNIT(S): at 16:28

## 2021-01-19 RX ADMIN — Medication 25 MICROGRAM(S): at 21:33

## 2021-01-19 RX ADMIN — Medication 37.5 MILLIGRAM(S): at 14:52

## 2021-01-19 NOTE — DISCHARGE NOTE PROVIDER - NSDCCPCAREPLAN_GEN_ALL_CORE_FT
PRINCIPAL DISCHARGE DIAGNOSIS  Diagnosis: Acute respiratory failure with hypoxia  Assessment and Plan of Treatment: You presented to the hospital with shortness of breath and found to have worsening case of your chronic heart failure. You were treated with water pills. Your symptoms improved. Please follow up with your cardiologist for further management of your chronic heart failure. Please return to the ED, if you have worsening shortness of breath, lower extremity swelling or chest pain.       PRINCIPAL DISCHARGE DIAGNOSIS  Diagnosis: Acute respiratory failure with hypoxia  Assessment and Plan of Treatment: You presented to the hospital with shortness of breath and found to have worsening case of your chronic heart failure. You were treated with medications to help remove water, called diuretics. Your symptoms improved. Please follow up with your cardiologist for further management of your chronic heart failure. Please return to the ED, if you have worsening shortness of breath, lower extremity swelling or chest pain.      SECONDARY DISCHARGE DIAGNOSES  Diagnosis: Chronic systolic heart failure  Assessment and Plan of Treatment: You have heart failure. Please continue taking your diuretics and metoprolol as prescribed. Please follow-up with Dr. Agosto 2/5 @ 2:20 PM.    Diagnosis: Stage 4 chronic kidney disease  Assessment and Plan of Treatment: You have reduced kidney function due to your underlying conditions and recurrent kidney injuries. Please avoid medications that can further damage your kidneys such as NSAIDs (ibuprofen). Please follow-up with your PCP/nephrologist within 1-2 weeks.    Diagnosis: Diabetes mellitus  Assessment and Plan of Treatment: You have diabetes that requires insulin. Please continue your insulin as prescribed and follow-up with your PCP or endocrinologist within 1-2 weeks of discharge.     PRINCIPAL DISCHARGE DIAGNOSIS  Diagnosis: Acute respiratory failure with hypoxia  Assessment and Plan of Treatment: You presented to the hospital with shortness of breath and found to have worsening case of your chronic heart failure. You were treated with medications to help remove water, called diuretics. Your symptoms improved. Please follow up with your cardiologist for further management of your chronic heart failure. Please return to the ED, if you have worsening shortness of breath, lower extremity swelling or chest pain.      SECONDARY DISCHARGE DIAGNOSES  Diagnosis: Chronic systolic heart failure  Assessment and Plan of Treatment: You have heart failure. Please continue taking your diuretics and metoprolol as prescribed. Please follow-up with Dr. Agosto 2/5 @ 2:20 PM.    Diagnosis: Stage 4 chronic kidney disease  Assessment and Plan of Treatment: You have reduced kidney function due to your underlying conditions and recurrent kidney injuries. Please avoid medications that can further damage your kidneys such as NSAIDs (ibuprofen). We have also started a medication called sodium bicarb which can help delay the progression of CKD. Please follow-up with Dr. Soira within 1-2 weeks.    Diagnosis: Diabetes mellitus  Assessment and Plan of Treatment: You have diabetes that requires insulin. Please continue your insulin as prescribed and follow-up with your PCP or endocrinologist within 1-2 weeks of discharge.     PRINCIPAL DISCHARGE DIAGNOSIS  Diagnosis: Acute respiratory failure with hypoxia  Assessment and Plan of Treatment: You presented to the hospital with shortness of breath and found to have worsening case of your chronic heart failure. You were treated with medications to help remove water, called diuretics. Your symptoms improved. Please follow up with your cardiologist for further management of your chronic heart failure. Please return to the ED, if you have worsening shortness of breath, lower extremity swelling or chest pain.      SECONDARY DISCHARGE DIAGNOSES  Diagnosis: Hypothyroidism  Assessment and Plan of Treatment: You have low thyroid hormone which requires you to take levothyroxine. Continue to take your medications as prescribed and follow-up with your endocrinologist in 4 weeks.    Diagnosis: Chronic systolic heart failure  Assessment and Plan of Treatment: You have heart failure. Please continue taking your diuretics and metoprolol as prescribed. Please follow-up with Dr. Agosto 2/5 @ 2:20 PM.    Diagnosis: Stage 4 chronic kidney disease  Assessment and Plan of Treatment: You have reduced kidney function due to your underlying conditions and recurrent kidney injuries. Please avoid medications that can further damage your kidneys such as NSAIDs (ibuprofen). We have also started a medication called sodium bicarb which can help delay the progression of CKD. Please follow-up with Dr. Soria within 1-2 weeks.    Diagnosis: Diabetes mellitus  Assessment and Plan of Treatment: You have diabetes that requires insulin. Please continue your insulin as prescribed and follow-up with your PCP or endocrinologist within 4 weeks of discharge.

## 2021-01-19 NOTE — PROGRESS NOTE ADULT - SUBJECTIVE AND OBJECTIVE BOX
CARDIOLOGY ATTENDING    tele: NSR, no events    appears much less dyspneic today, denies palpitations, syncope, nor angina, ROS otherwise -        Review of Systems:   Constitutional: [ ] fevers, [ ] chills.   Skin: [ ] dry skin. [ ] rashes.  Psychiatric: [ ] depression, [ ] anxiety.   Gastrointestinal: [ ] BRBPR, [ ] melena.   Neurological: [ ] confusion. [ ] seizures. [ ] shuffling gait.   Ears,Nose,Mouth and Throat: [ ] ear pain [ ] sore throat.   Eyes: [ ] diplopia.   Respiratory: [ ] hemoptysis. [ ] shortness of breath  Cardiovascular: See HPI above  Hematologic/Lymphatic: [ ] anemia. [ ] painful nodes. [ ] prolonged bleeding.   Genitourinary: [ ] hematuria. [ ] flank pain.   Endocrine: [ ] significant change in weight. [ ] intolerance to heat and cold.     Review of systems [ x] otherwise negative, [ ] otherwise unable to obtain    FH: no family history of sudden cardiac death in first degree relatives    SH: [ ] tobacco, [ ] alcohol, [ ] drugs    aspirin enteric coated 81 milliGRAM(s) Oral daily  atorvastatin 80 milliGRAM(s) Oral at bedtime  calamine/zinc oxide Lotion 1 Application(s) Topical three times a day  clobetasol 0.05% Cream 1 Application(s) Topical every 12 hours  clopidogrel Tablet 75 milliGRAM(s) Oral daily  dextrose 40% Gel 15 Gram(s) Oral once  dextrose 5%. 1000 milliLiter(s) IV Continuous <Continuous>  dextrose 5%. 1000 milliLiter(s) IV Continuous <Continuous>  dextrose 50% Injectable 25 Gram(s) IV Push once  dextrose 50% Injectable 12.5 Gram(s) IV Push once  dextrose 50% Injectable 25 Gram(s) IV Push once  glucagon  Injectable 1 milliGRAM(s) IntraMuscular once  heparin   Injectable 5000 Unit(s) SubCutaneous every 8 hours  hydrALAZINE 25 milliGRAM(s) Oral every 8 hours  insulin glargine Injectable (LANTUS) 54 Unit(s) SubCutaneous at bedtime  insulin lispro (ADMELOG) corrective regimen sliding scale   SubCutaneous three times a day before meals  insulin lispro (ADMELOG) corrective regimen sliding scale   SubCutaneous at bedtime  insulin lispro Injectable (ADMELOG) 23 Unit(s) SubCutaneous three times a day before meals  levothyroxine Injectable 25 MICROGram(s) IV Push at bedtime  melatonin 3 milliGRAM(s) Oral at bedtime  metoprolol tartrate 37.5 milliGRAM(s) Oral every 8 hours  senna 2 Tablet(s) Oral at bedtime                            10.8   8.90  )-----------( 406      ( 18 Jan 2021 06:34 )             35.6       01-19    134<L>  |  96  |  108<H>  ----------------------------<  255<H>  4.1   |  20<L>  |  2.47<H>    Ca    10.2      19 Jan 2021 06:01  Phos  5.3     01-18  Mg     2.3     01-18    TPro  8.3  /  Alb  3.9  /  TBili  0.4  /  DBili  x   /  AST  36  /  ALT  26  /  AlkPhos  103  01-19            T(C): 36.4 (01-19-21 @ 04:07), Max: 36.7 (01-18-21 @ 11:49)  HR: 81 (01-19-21 @ 04:07) (78 - 84)  BP: 103/63 (01-19-21 @ 04:07) (103/63 - 110/70)  RR: 18 (01-19-21 @ 04:07) (18 - 19)  SpO2: 94% (01-19-21 @ 09:39) (94% - 100%)  Wt(kg): --    I&O's Summary    18 Jan 2021 07:01  -  19 Jan 2021 07:00  --------------------------------------------------------  IN: 700 mL / OUT: 850 mL / NET: -150 mL    19 Jan 2021 07:01  -  19 Jan 2021 11:11  --------------------------------------------------------  IN: 240 mL / OUT: 0 mL / NET: 240 mL        General: Well nourished in no acute distress. Alert and Oriented * 3.   Head: Normocephalic and atraumatic.   Neck: No JVD. No bruits. Supple. Does not appear to be enlarged.   Cardiovascular: + S1,S2 ; RRR Soft systolic murmur at the left lower sternal border. No rubs noted.    Lungs: CTA b/l. No rhonchi, rales or wheezes.   Abdomen: + BS, soft. Non tender. Non distended. No rebound. No guarding.   Extremities: No clubbing/cyanosis/edema.   Neurologic: Moves all four extremities. Full range of motion.   Skin: Warm and moist. The patient's skin has normal elasticity and good skin turgor.   Psychiatric: Appropriate mood and affect.  Musculoskeletal: Normal range of motion, normal strength      A/P) 74 y/o female PMH HTN, HLD, DM, CAD s/p CABG 2014 and prior PCI, severe mitral regurgitation (s/p mitral clip 2019), pulmonary HTN, HF (EF 21 % June 2019), CKD IV not on dialysis (b/l SCr 2-2.2), hypothyroidism a/w acute on chronic systolic CHF with NYHA IV functional status. She now appears euvolemic    -repeat echo today to assess degree of mitral stenosis, would continue to escalate beta blocker therapy  -hold diuretics today as Cr stable  -f/u with CHF today - patient likely to benefit from further beta blockers  -May consider RHC at some point on this admission  -very poor candidate for a primary prevention ICD  -f/u with Ashleigh on 1/22 at 1220pm        Ayala Cook M.D., [facility]  Cardiac Electrophysiology  [facility]  [address] [place] [address], [address] [place] [address]  [email or web address]    office [phone]  pager [phone]

## 2021-01-19 NOTE — PROGRESS NOTE ADULT - ATTENDING COMMENTS
75F w/ PMHx of severe ICM (EF 20%), severe MR s/p mitraclip, HTN, HLD, DM2, CKD b/l CR~2 p/w sob. Suspect acute on chronic chf exacerbation. PNA less likely, can dc abx. She is now s/p diuresis and off o2. Holding diuretic today for Cr bump. Will uptitrate metop to achieve goal HR.     #acute on chronic HFrEF  -ECG NSR, pvcs, RA deviation, diffuse TWIs and ST deppressions in precordial leads  -suspect trop is demand in setting of CHF excacerbation  -she is warm at this time - c/w metop increased dose, hydral per CHF  -holding diuretics  -tele  -repeat TTE per cards  -cards consult appreciated  -trend LFTs daily  -strict is/os and daily standing weights    #DM2  -titrate insulin prn    #rash  -diffuse erythematous, raised pruritic papules  ?severe eczema  -start steroid cream, hydroxycine prn, permetherin per derm  -improving    #decreased LLE pulses - vasc doppler negative    Tor Newberry MD  Division of Hospital Medicine  Pager: [phone]  Office: [phone]

## 2021-01-19 NOTE — PROGRESS NOTE ADULT - PROBLEM SELECTOR PLAN 1
c/w hydral 25 mg q8h  start ISDN 10 mg TID; hold for SBP <90  switch lopressor to Toprol  mg daily starting tomorrow; goal HR 60-70  continue to hold diuretics for now  will arrange for RHC tomorrow

## 2021-01-19 NOTE — PROGRESS NOTE ADULT - SUBJECTIVE AND OBJECTIVE BOX
Jacquie Amos MD  Internal Medicine, PGY-3   [phone]    Patient is a 75y old  Female who presents with a chief complaint of increased work of breathing (18 Jan 2021 20:15)      SUBJECTIVE / OVERNIGHT EVENTS:        REVIEW OF SYSTEMS:    CONSTITUTIONAL: No weakness, fevers or chills  EYES: No visual changes; No blurry vision  ENT:  No pain or stiffness; No vertigo or throat pain  RESPIRATORY: No cough, wheezing, hemoptysis; No shortness of breath  CARDIOVASCULAR: No chest pain or palpitations  GASTROINTESTINAL: No abdominal or epigastric pain. No nausea, vomiting, or hematemesis; No diarrhea or constipation. No melena or hematochezia.  GENITOURINARY: No dysuria, frequency or hematuria  NEUROLOGICAL: No numbness, No HA  SKIN: No itching, rashes  MSK: no joint pain, no muscle pain    MEDICATIONS  (STANDING):  aspirin enteric coated 81 milliGRAM(s) Oral daily  atorvastatin 80 milliGRAM(s) Oral at bedtime  calamine/zinc oxide Lotion 1 Application(s) Topical three times a day  clobetasol 0.05% Cream 1 Application(s) Topical every 12 hours  clopidogrel Tablet 75 milliGRAM(s) Oral daily  dextrose 40% Gel 15 Gram(s) Oral once  dextrose 5%. 1000 milliLiter(s) (50 mL/Hr) IV Continuous <Continuous>  dextrose 5%. 1000 milliLiter(s) (100 mL/Hr) IV Continuous <Continuous>  dextrose 50% Injectable 25 Gram(s) IV Push once  dextrose 50% Injectable 12.5 Gram(s) IV Push once  dextrose 50% Injectable 25 Gram(s) IV Push once  glucagon  Injectable 1 milliGRAM(s) IntraMuscular once  heparin   Injectable 5000 Unit(s) SubCutaneous every 8 hours  hydrALAZINE 25 milliGRAM(s) Oral every 8 hours  insulin glargine Injectable (LANTUS) 46 Unit(s) SubCutaneous at bedtime  insulin lispro (ADMELOG) corrective regimen sliding scale   SubCutaneous three times a day before meals  insulin lispro (ADMELOG) corrective regimen sliding scale   SubCutaneous at bedtime  insulin lispro Injectable (ADMELOG) 20 Unit(s) SubCutaneous three times a day before meals  levothyroxine Injectable 25 MICROGram(s) IV Push at bedtime  melatonin 3 milliGRAM(s) Oral at bedtime  metoprolol tartrate 25 milliGRAM(s) Oral every 8 hours  senna 2 Tablet(s) Oral at bedtime    MEDICATIONS  (PRN):      CAPILLARY BLOOD GLUCOSE      POCT Blood Glucose.: 274 mg/dL (19 Jan 2021 07:20)  POCT Blood Glucose.: 387 mg/dL (18 Jan 2021 22:21)  POCT Blood Glucose.: 402 mg/dL (18 Jan 2021 22:20)  POCT Blood Glucose.: 473 mg/dL (18 Jan 2021 21:02)  POCT Blood Glucose.: 491 mg/dL (18 Jan 2021 21:00)  POCT Blood Glucose.: 388 mg/dL (18 Jan 2021 16:27)  POCT Blood Glucose.: 353 mg/dL (18 Jan 2021 11:37)    I&O's Summary    18 Jan 2021 07:01  -  19 Jan 2021 07:00  --------------------------------------------------------  IN: 700 mL / OUT: 850 mL / NET: -150 mL        PHYSICAL EXAM:  Vital Signs Last 24 Hrs  T(C): 36.4 (19 Jan 2021 04:07), Max: 36.7 (18 Jan 2021 11:49)  T(F): 97.6 (19 Jan 2021 04:07), Max: 98 (18 Jan 2021 11:49)  HR: 81 (19 Jan 2021 04:07) (78 - 84)  BP: 103/63 (19 Jan 2021 04:07) (103/63 - 110/70)  BP(mean): --  RR: 18 (19 Jan 2021 04:07) (18 - 19)  SpO2: 99% (19 Jan 2021 04:07) (94% - 100%)    PHYSICAL EXAM:  GENERAL: NAD, well-groomed, well-developed  HEAD:  Atraumatic, Normocephalic  EYES: EOMI, PERRLA, conjunctiva and sclera clear  ENMT: No tonsillar erythema, exudates, or enlargement; Moist mucous membranes, Good dentition, No lesions  NECK: Supple, No JVD, Normal thyroid  CHEST/LUNG: Clear to ausculation bilaterally; No rales, rhonchi, wheezing, or rubs  HEART: Regular rate and rhythm; No murmurs, rubs, or gallops  ABDOMEN: Soft, Nontender, Nondistended; Bowel sounds present  EXTREMITIES:  2+ Peripheral Pulses, No clubbing, cyanosis, or edema  LYMPH: No lymphadenopathy noted  SKIN: No rashes or lesions  NERVOUS SYSTEM:  Alert & Oriented X3, Good concentration; Motor Strength 5/5 B/L upper and lower extremities; DTRs 2+ intact and symmetric      LABS:                        10.8   8.90  )-----------( 406      ( 18 Jan 2021 06:34 )             35.6     01-19    134<L>  |  96  |  108<H>  ----------------------------<  255<H>  4.1   |  20<L>  |  2.47<H>    Ca    10.2      19 Jan 2021 06:01  Phos  5.3     01-18  Mg     2.3     01-18    TPro  8.3  /  Alb  3.9  /  TBili  0.4  /  DBili  x   /  AST  36  /  ALT  26  /  AlkPhos  103  01-19              Procalcitonin, Serum: 0.14 (01-12)    C-Reactive Protein, Serum: 0.63 (01-12)    Ferritin, Serum: 111 (01-13)      D-Dimer Assay, Quantitative: 433 (01-12)    COVID-19 PCR: NotDetec (01-12-21 @ 21:56)      RADIOLOGY & ADDITIONAL TESTS:  Results Reviewed:   Imaging Personally Reviewed:  Electrocardiogram Personally Reviewed:    COORDINATION OF CARE:  Care Discussed with Consultants/Other Providers [Y/N]:  Prior or Outpatient Records Reviewed [Y/N]:   Jacquie Amos MD  Internal Medicine, PGY-3   [phone]    Patient is a 75y old  Female who presents with a chief complaint of increased work of breathing (18 Jan 2021 20:15)      SUBJECTIVE / OVERNIGHT EVENTS:  No acute events overnight.  Pt now 98% on RA. Tele sinus 70-90s, max 120       REVIEW OF SYSTEMS:    CONSTITUTIONAL: No fevers or chills  RESPIRATORY: No cough, wheezing,No shortness of breath  CARDIOVASCULAR: stable chest pressure   GASTROINTESTINAL: No abdominal or epigastric pain. No nausea, vomiting, or hematemesis; No diarrhea or constipation. No melena or hematochezia.  GENITOURINARY: No dysuria, frequency or hematuria      MEDICATIONS  (STANDING):  aspirin enteric coated 81 milliGRAM(s) Oral daily  atorvastatin 80 milliGRAM(s) Oral at bedtime  calamine/zinc oxide Lotion 1 Application(s) Topical three times a day  clobetasol 0.05% Cream 1 Application(s) Topical every 12 hours  clopidogrel Tablet 75 milliGRAM(s) Oral daily  dextrose 40% Gel 15 Gram(s) Oral once  dextrose 5%. 1000 milliLiter(s) (50 mL/Hr) IV Continuous <Continuous>  dextrose 5%. 1000 milliLiter(s) (100 mL/Hr) IV Continuous <Continuous>  dextrose 50% Injectable 25 Gram(s) IV Push once  dextrose 50% Injectable 12.5 Gram(s) IV Push once  dextrose 50% Injectable 25 Gram(s) IV Push once  glucagon  Injectable 1 milliGRAM(s) IntraMuscular once  heparin   Injectable 5000 Unit(s) SubCutaneous every 8 hours  hydrALAZINE 25 milliGRAM(s) Oral every 8 hours  insulin glargine Injectable (LANTUS) 46 Unit(s) SubCutaneous at bedtime  insulin lispro (ADMELOG) corrective regimen sliding scale   SubCutaneous three times a day before meals  insulin lispro (ADMELOG) corrective regimen sliding scale   SubCutaneous at bedtime  insulin lispro Injectable (ADMELOG) 20 Unit(s) SubCutaneous three times a day before meals  levothyroxine Injectable 25 MICROGram(s) IV Push at bedtime  melatonin 3 milliGRAM(s) Oral at bedtime  metoprolol tartrate 25 milliGRAM(s) Oral every 8 hours  senna 2 Tablet(s) Oral at bedtime    MEDICATIONS  (PRN):      CAPILLARY BLOOD GLUCOSE      POCT Blood Glucose.: 274 mg/dL (19 Jan 2021 07:20)  POCT Blood Glucose.: 387 mg/dL (18 Jan 2021 22:21)  POCT Blood Glucose.: 402 mg/dL (18 Jan 2021 22:20)  POCT Blood Glucose.: 473 mg/dL (18 Jan 2021 21:02)  POCT Blood Glucose.: 491 mg/dL (18 Jan 2021 21:00)  POCT Blood Glucose.: 388 mg/dL (18 Jan 2021 16:27)  POCT Blood Glucose.: 353 mg/dL (18 Jan 2021 11:37)    I&O's Summary    18 Jan 2021 07:01  -  19 Jan 2021 07:00  --------------------------------------------------------  IN: 700 mL / OUT: 850 mL / NET: -150 mL        PHYSICAL EXAM:  Vital Signs Last 24 Hrs  T(C): 36.4 (19 Jan 2021 04:07), Max: 36.7 (18 Jan 2021 11:49)  T(F): 97.6 (19 Jan 2021 04:07), Max: 98 (18 Jan 2021 11:49)  HR: 81 (19 Jan 2021 04:07) (78 - 84)  BP: 103/63 (19 Jan 2021 04:07) (103/63 - 110/70)  BP(mean): --  RR: 18 (19 Jan 2021 04:07) (18 - 19)  SpO2: 99% (19 Jan 2021 04:07) (94% - 100%)    PHYSICAL EXAM:  GENERAL: NAD, cachectic   HEAD:  Atraumatic, Normocephalic  EYES: EOMI, PERRLA, conjunctiva and sclera clear  CHEST/LUNG: bibasilar crackles, no wheeze  HEART: Regular rate and rhythm; No murmur  ABDOMEN: Soft, Nontender, Nondistended; Bowel sounds present  EXTREMITIES: no edema  NERVOUS SYSTEM:  Alert & Oriented X3, Good concentration; no focal deficits.      LABS:                        10.8   8.90  )-----------( 406      ( 18 Jan 2021 06:34 )             35.6     01-19    134<L>  |  96  |  108<H>  ----------------------------<  255<H>  4.1   |  20<L>  |  2.47<H>    Ca    10.2      19 Jan 2021 06:01  Phos  5.3     01-18  Mg     2.3     01-18    TPro  8.3  /  Alb  3.9  /  TBili  0.4  /  DBili  x   /  AST  36  /  ALT  26  /  AlkPhos  103  01-19              Procalcitonin, Serum: 0.14 (01-12)    C-Reactive Protein, Serum: 0.63 (01-12)    Ferritin, Serum: 111 (01-13)      D-Dimer Assay, Quantitative: 433 (01-12)    COVID-19 PCR: [place] (01-12-21 @ 21:56)

## 2021-01-19 NOTE — PROGRESS NOTE ADULT - ASSESSMENT
Briefly, 76 yo F with HTN, HLD, DM2 (A1c 7.5 10/19), CAD s/p CABG (LIMA to LAD, SVG to OM, SVG to PDA) and prior PCI, ICM HFrEF (EF 20-25%, LVEDD 4.7 cm), severe mitral regurgitation s/p mitral clip (6/19; mean MV gradient 7-8), severe pulmonary HTN, CKD IV (b/l Cr 1.9-2.2), hypothyroidism who BIBEMS 2/2 worsening work of breathing presents in acute on chronic systolic CHF with NYHA IV functional status. Was notably on midodrine as outpatient since discharge from [facility] in 10/19. Has been having increased work of breathing at home with failure to thrive. Initially required Bipap and was given bumex with some improvement. Continues to be dyspneic. Etiology of her symptoms may be secondary to degree of mitral stenosis given HR in 80-90s with mean gradient of 8 on TTE. Diuretics have been as of yesterday in the setting of worsening renal function and she is still tachycardic at times.

## 2021-01-19 NOTE — PROGRESS NOTE ADULT - PROBLEM SELECTOR PLAN 5
-Takes 60 units of lantus at bedtime and 18 units of humalog   - increased Lantus and Admelog based on sliding scale.  Will continue to titrate to achieve BS goal 140-180.  Increased insulin dosages on 1/18, 1/19 -Takes 60 units of lantus at bedtime and 18 units of humalog   - increased Lantus and Admelog based on sliding scale.  Will continue to titrate to achieve BS goal 140-180.  Increased insulin dosages on 1/18, 1/19.  - consulted endocrinology for further recommendations - pt may need BID lantus

## 2021-01-19 NOTE — PROGRESS NOTE ADULT - PROBLEM SELECTOR PLAN 1
- 2/2 CHF exacerbation.  EF 20% w/ mitral stenosis   - s/p TID 4mg Bumex; stopped 1/18 b/c of increased Cr and euvolemic status   - c/w Lopressor; titrating dose to achieve HR 60-70   - CXR on admission showed bilateral predominantly lower lobe hazy opacities which is likely 2/2 fluid.  Not presenting w/ clinical signs of PNA and CT chest showed no focal consolidations.  Monitoring of abx.  Blood cx also negative

## 2021-01-19 NOTE — PROGRESS NOTE ADULT - PROBLEM SELECTOR PLAN 4
Most likely due to LV failure   -repeat TTE shows mild pulm HTN w/ normal RV size but decreased function

## 2021-01-19 NOTE — DISCHARGE NOTE PROVIDER - PROVIDER TOKENS
PROVIDER:[TOKEN:[[id number]],SCHEDULEDAPPT:[02/05/2021],SCHEDULEDAPPTTIME:[02:20 PM],ESTABLISHEDPATIENT:[T]] PROVIDER:[TOKEN:[[id number]],SCHEDULEDAPPT:[02/05/2021],SCHEDULEDAPPTTIME:[02:20 PM],ESTABLISHEDPATIENT:[T]],PROVIDER:[TOKEN:[[id number]],FOLLOWUP:[1 week],ESTABLISHEDPATIENT:[T]] PROVIDER:[TOKEN:[[id number]],SCHEDULEDAPPT:[02/05/2021],SCHEDULEDAPPTTIME:[02:20 PM],ESTABLISHEDPATIENT:[T]],PROVIDER:[TOKEN:[[id number]],FOLLOWUP:[1 week],ESTABLISHEDPATIENT:[T]],PROVIDER:[[id number]],FOLLOWUP:[1 month],ESTABLISHEDPATIENT:[T]] PROVIDER:[TOKEN:[[id number]],SCHEDULEDAPPT:[02/05/2021],SCHEDULEDAPPTTIME:[02:20 PM],ESTABLISHEDPATIENT:[T]],PROVIDER:[TOKEN:[[id number]],FOLLOWUP:[1 week],ESTABLISHEDPATIENT:[T]],PROVIDER:[TOKEN:[[id number]],FOLLOWUP:[1 month],ESTABLISHEDPATIENT:[T]],PROVIDER:[TOKEN:[[id number]],FOLLOWUP:[2 weeks],ESTABLISHEDPATIENT:[T]]

## 2021-01-19 NOTE — DISCHARGE NOTE PROVIDER - NSFOLLOWUPCLINICS_GEN_ALL_ED_FT
[facility]  Hematology/Oncology  [address] [id number]  Phone: [phone]  Fax:   Follow Up Time: Routine

## 2021-01-19 NOTE — PROGRESS NOTE ADULT - ATTENDING COMMENTS
73yrs, CAD. LVEF 20-25. s/p CABG. Severe MR. s/p clip June 2019. PHTN. DM. CKD Cr 1.9.   Not hosp for HF since 10.2019.  Admitted 1.12 with ADHF. Bumex IV , BiPAP. Admission weight 121, today 115.   Admission Cr 2.2, peak 2.5, today 2.5.   Bumex 4 Q 8 held yesterday.   TTE 1.13: LVEDD 4.7, LVEF 20, normal RV size decreased function. mild-mod MR. mean grad 7-8. with HR 80.  Lopressor increased in light of mild Mv gradient.   meds; lopressor 37.5 Q 8, HDZN 25 Q8, ASA plavix statin.   SR 70-90, 103/-110/  01-19    134<L>  |  96  |  108<H>  ----------------------------<  255<H>  4.1   |  20<L>  |  2.47<H>  Cr 2.47, 2.5.   Ca    10.2      19 Jan 2021 06:01  Phos  5.3     01-18  Mg     2.3     01-18  TPro  8.3  /  Alb  3.9  /  TBili  0.4  /  DBili  x   /  AST  36  /  ALT  26  /  AlkPhos  103  01-19                        10.8   8.90  )-----------( 406      ( 18 Jan 2021 06:34 )             35.6 73yrs, CAD. LVEF 20-25. s/p CABG. Severe MR. s/p clip June 2019. PHTN. DM. CKD Cr 1.9.   Not hosp for HF since 10.2019.  Admitted 1.12 with ADHF. Bumex IV , BiPAP. Admission weight 121, today 115.   Admission Cr 2.2, peak 2.5, today 2.5.   Bumex 4 Q 8 held yesterday.   TTE 1.13: LVEDD 4.7, LVEF 20, normal RV size decreased function. mild-mod MR. mean grad 7-8. with HR 80.  Lopressor increased in light of mild Mv gradient.   meds; lopressor 37.5 Q 8, HDZN 25 Q8, ASA plavix statin.   SR 70-90, 103/-110/  01-19    134<L>  |  96  |  108<H>  ----------------------------<  255<H>  4.1   |  20<L>  |  2.47<H>  Cr 2.47, 2.5.   Ca    10.2      19 Jan 2021 06:01  Phos  5.3     01-18  Mg     2.3     01-18  TPro  8.3  /  Alb  3.9  /  TBili  0.4  /  DBili  x   /  AST  36  /  ALT  26  /  AlkPhos  103  01-19                        10.8   8.90  )-----------( 406      ( 18 Jan 2021 06:34 )             35.6  examination challenging. patient is dyspneic at rest and appears to have elevated neck veins yet renal function has worsened with 6lb diuresis.   Suggest:  RHC tomorrow to guage fluid status and readiness for d/c.  make lopressor toprol . Add ISDN 10 to 20 tid. continue to hold diuretics until hemodynamics available.  Kendra Loera 75yrs, CAD. LVEF 20-25. s/p CABG. Severe MR. s/p clip June 2019. PHTN. DM. CKD Cr 1.9.   Not hosp for HF since 10.2019.  Admitted 1.12 with ADHF. Bumex IV , BiPAP. Admission weight 121, today 115.   Admission Cr 2.2, peak 2.5, today 2.5.   Bumex 4 Q 8 held yesterday.   TTE 1.13: LVEDD 4.7, LVEF 20, normal RV size decreased function. mild-mod MR. mean grad 7-8. with HR 80.  Lopressor increased in light of mild Mv gradient.   meds; lopressor 37.5 Q 8, HDZN 25 Q8, ASA plavix statin.   SR 70-90, 103/-110/  01-19    134<L>  |  96  |  108<H>  ----------------------------<  255<H>  4.1   |  20<L>  |  2.47<H>  Cr 2.47, 2.5.   Ca    10.2      19 Jan 2021 06:01  Phos  5.3     01-18  Mg     2.3     01-18  TPro  8.3  /  Alb  3.9  /  TBili  0.4  /  DBili  x   /  AST  36  /  ALT  26  /  AlkPhos  103  01-19                        10.8   8.90  )-----------( 406      ( 18 Jan 2021 06:34 )             35.6  examination challenging. patient is dyspneic at rest and appears to have elevated neck veins yet renal function has worsened with 6lb diuresis.   Suggest:  RHC tomorrow to guage fluid status and readiness for d/c.  make lopressor toprol . Add ISDN 10 to 20 tid. continue to hold diuretics until hemodynamics available.  Kendra Loera

## 2021-01-19 NOTE — DISCHARGE NOTE PROVIDER - CARE PROVIDERS DIRECT ADDRESSES
,DirectAddress_Unknown ,DirectAddress_Unknown,DirectAddress_Unknown ,DirectAddress_Unknown,DirectAddress_Unknown,DirectAddress_Unknown ,DirectAddress_Unknown,DirectAddress_Unknown,DirectAddress_Unknown,[email or web address]

## 2021-01-19 NOTE — PROGRESS NOTE ADULT - ASSESSMENT
74 yo F w/ PMH of CKD stage 4 (baseline Cr 1.9-2.2) HTN, T2DM, CAD s/p CABG X3 (2014 at [facility]), non-dilated ICM (EF 20-25%), severe mitral regurgitation s/p mitral clip (6/13/19), severe pulm HTN, hypothyroidism who presented for evaluation of SOB and was admitted for ADHF.    CKD stage 4  - baseline Cr 1.9-2.2; has had recurrent MERVIN's over the years  - CKD is likely 2/2 recurrent MERVIN's + underlying DM/HTN  - Scr uptrending likely sec to uncontrolled DM and diuretics -- Optimize Glucose control  - Diuretics held at present, Diuretics per heart failure team   - renal sonogram in the past with simple renal cyst  - Avoid nephrotoxins including NSAIDs, IV contrast (if possible), [employer] products  - monitor I/O's accurately    HTN  BP controlled  Low salt diet   MOnitor BP    Proteinuria/Hematuria  - likely from underlying DM  - has 1.8 grams proteinuria  - Hep B, Hep C, HIV RF, C3, C4, p-ANCA, c-ANCA, RPR neg  - weak gamma-migrating protein, weak IgG lambda protein band noted in the past. Pt to follow w/ heme   - DEAN 1:320 positive  - RPR neg, FTA +

## 2021-01-19 NOTE — PROGRESS NOTE ADULT - SUBJECTIVE AND OBJECTIVE BOX
[facility] NEPHROLOGY PRACTICE   MD Dion Dasilva MD, D.O. Ruoru Wong, PA    From 7 AM - 5 PM:  OFFICE: [phone]  Dr. Muhammad cell: [phone]  Dr. Montero cell: [phone]  Dr. Soria cell: [phone]  RASHIDA Smith cell: [phone]    From 5 PM - 7 AM: Answering Service: [phone]  Date of service: 01-19-21 @ 15:02    RENAL FOLLOW UP NOTE  --------------------------------------------------------------------------------  HPI:  Pt seen and examined at bedside. SOB improving    PAST HISTORY  --------------------------------------------------------------------------------  No significant changes to PMH, PSH, FHx, SHx, unless otherwise noted    ALLERGIES & MEDICATIONS  --------------------------------------------------------------------------------  Allergies    azithromycin (Hives; Pruritus)    Intolerances      Standing Inpatient Medications  aspirin enteric coated 81 milliGRAM(s) Oral daily  atorvastatin 80 milliGRAM(s) Oral at bedtime  calamine/zinc oxide Lotion 1 Application(s) Topical three times a day  clobetasol 0.05% Cream 1 Application(s) Topical every 12 hours  clopidogrel Tablet 75 milliGRAM(s) Oral daily  dextrose 40% Gel 15 Gram(s) Oral once  dextrose 5%. 1000 milliLiter(s) IV Continuous <Continuous>  dextrose 5%. 1000 milliLiter(s) IV Continuous <Continuous>  dextrose 50% Injectable 25 Gram(s) IV Push once  dextrose 50% Injectable 12.5 Gram(s) IV Push once  dextrose 50% Injectable 25 Gram(s) IV Push once  glucagon  Injectable 1 milliGRAM(s) IntraMuscular once  heparin   Injectable 5000 Unit(s) SubCutaneous every 8 hours  hydrALAZINE 25 milliGRAM(s) Oral every 8 hours  insulin glargine Injectable (LANTUS) 60 Unit(s) SubCutaneous at bedtime  insulin lispro (ADMELOG) corrective regimen sliding scale   SubCutaneous three times a day before meals  insulin lispro (ADMELOG) corrective regimen sliding scale   SubCutaneous at bedtime  insulin lispro Injectable (ADMELOG) 23 Unit(s) SubCutaneous three times a day before meals  levothyroxine Injectable 25 MICROGram(s) IV Push at bedtime  melatonin 3 milliGRAM(s) Oral at bedtime  metoprolol tartrate 37.5 milliGRAM(s) Oral every 8 hours  senna 2 Tablet(s) Oral at bedtime    PRN Inpatient Medications      REVIEW OF SYSTEMS  --------------------------------------------------------------------------------  General: no fever  CVS: no chest pain  RESP: no sob, no cough  ABD: no abdominal pain  : no dysuria  MSK: no edema     VITALS/PHYSICAL EXAM  --------------------------------------------------------------------------------  T(C): 36.4 (01-19-21 @ 11:30), Max: 36.5 (01-18-21 @ 20:04)  HR: 80 (01-19-21 @ 14:55) (79 - 84)  BP: 105/61 (01-19-21 @ 14:55) (98/65 - 106/62)  RR: 18 (01-19-21 @ 11:30) (18 - 19)  SpO2: 100% (01-19-21 @ 11:30) (94% - 100%)  Wt(kg): --    01-18-21 @ 07:01  -  01-19-21 @ 07:00  --------------------------------------------------------  IN: 700 mL / OUT: 850 mL / NET: -150 mL    01-19-21 @ 07:01  -  01-19-21 @ 15:02  --------------------------------------------------------  IN: 460 mL / OUT: 150 mL / NET: 310 mL      Physical Exam:  	Gen: NAD  	HEENT: MMM  	Pulm: CTA B/L  	CV: S1S2  	Abd: Soft, +BS  	Ext: No LE edema B/L                      Neuro: Awake   	Skin: Warm and Dry   	    LABS/STUDIES  --------------------------------------------------------------------------------              10.8   8.90  >-----------<  406      [01-18-21 @ 06:34]              35.6     134  |  96  |  108  ----------------------------<  255      [01-19-21 @ 06:01]  4.1   |  20  |  2.47        Ca     10.2     [01-19-21 @ 06:01]      Mg     2.3     [01-18-21 @ 06:34]      Phos  5.3     [01-18-21 @ 06:34]    TPro  8.3  /  Alb  3.9  /  TBili  0.4  /  DBili  x   /  AST  36  /  ALT  26  /  AlkPhos  103  [01-19-21 @ 06:01]    Creatinine Trend:  SCr 2.47 [01-19 @ 06:01]  SCr 2.54 [01-18 @ 18:03]  SCr 2.39 [01-18 @ 06:34]  SCr 2.22 [01-17 @ 18:07]  SCr 2.07 [01-17 @ 07:02]    Urinalysis - [01-12-21 @ 19:28]      Color Light Yellow / Appearance Clear / SG 1.011 / pH 6.0      Gluc Negative / Ketone Negative  / Bili Negative / Urobili Negative       Blood Negative / Protein 30 mg/dL / Leuk Est Moderate / Nitrite Negative      RBC 1 / WBC 5 / Hyaline 4 / Gran  / Sq Epi  / Non Sq Epi 1 / Bacteria Negative    Ferritin 111      [01-13-21 @ 01:42]  HbA1c 7.5      [10-08-19 @ 14:30]  TSH 2.15      [01-13-21 @ 10:07]

## 2021-01-19 NOTE — PROGRESS NOTE ADULT - PROBLEM SELECTOR PLAN 2
-TTE 1/13 showed EF of 20% with global LV and RV dysfunction w/ MS  - Heart failure and cardiology following  - titrating Lopressor to achieve goal HR 60-70  - stopped bumex 1/18 for euvolemic status and increasing Cr  - per HF not dobutamine indicated at this time

## 2021-01-19 NOTE — PROGRESS NOTE ADULT - ASSESSMENT
75y.o F Occitan speaking h/o HTN, HLD, DM, CAD s/p CABG 2014 and prior PCI, severe mitral regurgitation (s/p mitral clip 2019), pulmonary HTN (TTE 2019), HF (EF 21 % June 2019), CKD IV not on dialysis (b/l SCr 2-2.2), hypothyroidism BIBEMS 2/2 worsening work of breathing, found to have crackles, elevated proBNP and b/l pulmonary edema on CXR c/w acute exacerbation of CHF.

## 2021-01-19 NOTE — DISCHARGE NOTE PROVIDER - HOSPITAL COURSE
74 yo F with PMhx of CAD s/p CABG and stent x2, CABG, DMII, HTN, HLD and hypothyroidism presented to the ED with complaints of ZEE, pulmonary edema and elevated proBNP. She was diagnosed with acute exacerbation of CHF and admitted for diuretic management. Cardiology and HF were consulted. Patient was initially treated with bumex and metolazone. Then she was switched to bumex only with optimal urine output. CT scan and cultures were also obtained which showed no acute PNA or infection. 76 yo F with PMhx of CAD s/p CABG and stent x2, CABG, DMII, HTN, HLD and hypothyroidism presented to the ED with complaints of ZEE, pulmonary edema and elevated proBNP. She was diagnosed with acute exacerbation of CHF and admitted for diuretic management. Cardiology and HF were consulted. Patient was initially treated with bumex and metolazone. Then she was switched to bumex only with optimal urine output. CT scan and cultures were also obtained which showed no acute PNA or infection. Pt's diabetes was also uncontrolled and required titration of insulin. Pt complained of neck pain and a CTC spine did not reveal any fractures. A RHC was also performed RA 19/11/15, RV 49/5/10, PCW 22/23/19, PA 50/21/32, Chapin C.O 3.49, Chapin CI 2.44 and pt was started on milrinone for 5 days. 76 yo F with PMhx of CAD s/p CABG and stent x2, CABG, DMII, HTN, HLD and hypothyroidism presented to the ED with complaints of ZEE, pulmonary edema and elevated proBNP. She was diagnosed with acute exacerbation of CHF and admitted for diuretic management. Cardiology and HF were consulted. Patient was initially treated with bumex and metolazone. Then she was switched to bumex only with optimal urine output. CT scan and cultures were also obtained which showed no acute PNA or infection. Pt's diabetes was also uncontrolled and required titration of insulin. Pt complained of neck pain and a CTC spine did not reveal any fractures. A RHC was also performed RA 19/11/15, RV 49/5/10, PCW 22/23/19, PA 50/21/32, Chapin C.O 3.49, Chapin CI 2.44 and pt was started on milrinone for 5 days. Pt was started on sodium bicarb 650 once a day for CKD. Pt's volume status was optimized and she is medically stable for discharge.

## 2021-01-19 NOTE — DISCHARGE NOTE PROVIDER - CARE PROVIDER_API CALL
SARITHA FAIR  Cardiovascular Disease  [address] [place] [address]  Phone: [phone]  Fax: [phone]  Established Patient  Scheduled Appointment: 02/05/2021 02:20 PM   SARITHA FAIR  Cardiovascular Disease  [address] [place] [address]  Phone: [phone]  Fax: [phone]  Established Patient  Scheduled Appointment: 02/05/2021 02:20 PM    Rey Soria (DO)  Nephrology  [address] [facility], [address] [place] [address]  Phone: [phone]  Fax: [phone]  Established Patient  Follow Up Time: 1 week   SARITHA FAIR  Cardiovascular Disease  [address] [place] [address]  Phone: [phone]  Fax: [phone]  Established Patient  Scheduled Appointment: 02/05/2021 02:20 PM    Rey Soria ()  Nephrology  [address] [place] [address]  Phone: [phone]  Fax: [phone]  Established Patient  Follow Up Time: 1 week    Jillian Banks)  EndocrinologyMetabDiabetes; Internal Medicine  [address] [place] [address]  Phone: [phone]  Fax: [phone]  Established Patient  Follow Up Time: 1 month   SARITHA FAIR  Cardiovascular Disease  [address] [place] [address]  Phone: [phone]  Fax: [phone]  Established Patient  Scheduled Appointment: 02/05/2021 02:20 PM    Rey Soria (DO)  Nephrology  [address] [place] [address]  Phone: [phone]  Fax: [phone]  Established Patient  Follow Up Time: 1 week    Jillian Banks)  EndocrinologyMetabDiabetes; Internal Medicine  [address] [place] [address]  Phone: [phone]  Fax: [phone]  Established Patient  Follow Up Time: 1 month    Hira Gregg)  Cardiovascular Disease; Internal Medicine  [address] [place] [address]  Phone: [phone]  Fax: [phone]  Established Patient  Follow Up Time: 2 weeks

## 2021-01-19 NOTE — PROGRESS NOTE ADULT - SUBJECTIVE AND OBJECTIVE BOX
INTERVAL HPI/OVERNIGHT EVENTS: no acute events overnight     SUBJECTIVE: Patient seen and examined at bedside. Patient reports that her shoulder and foot pain has resolved. Patient denies chest pain/shortness of breath/abdominal pain/nausea/vomiting/diarrhea.     ROS: otherwise negative    OBJECTIVE:    VITAL SIGNS:  ICU Vital Signs Last 24 Hrs  T(C): 36.7 (01 May 2019 08:17), Max: 37 (30 Apr 2019 16:00)  T(F): 98 (01 May 2019 08:17), Max: 98.6 (30 Apr 2019 16:00)  HR: 99 (01 May 2019 10:00) (82 - 102)  BP: 109/61 (01 May 2019 10:00) (100/64 - 149/77)  BP(mean): 76 (01 May 2019 10:00) (72 - 99)  RR: 24 (01 May 2019 10:00) (14 - 29)  SpO2: 99% (01 May 2019 10:00) (93% - 100%)    04-30 @ 07:01  -  05-01 @ 07:00  --------------------------------------------------------  IN: 300 mL / OUT: 1755 mL / NET: -1455 mL    CAPILLARY BLOOD GLUCOSE    POCT Blood Glucose.: 247 mg/dL (01 May 2019 08:01)    PHYSICAL EXAM:  General: elderly female, appearing stated age. Breathing well    HEENT: normocephalic, atraumatic. Moist mucous membranes   Eyes: clear conjunctiva, PERRL  Neck: supple. JVD to mid neck    Respiratory: Vertical scar with keloid formation. Clear to auscultation bilaterally. Clear to auscultation anteriorly. Breathing well on room air   Cardiovascular: S1, S2. Regular rate and rhythm. No murmurs/rubs/gallops  Abdomen: soft, nondistended, nontender. +BS  Extremities: WWP, 2+ peripheral pulses b/l; no LE edema. Feet and shoulders nontender   Skin: normal color and turgor; no rash  Neurological: awake and alert     MEDICATIONS:  MEDICATIONS  (STANDING):  ALBUTerol/ipratropium for Nebulization 3 milliLiter(s) Nebulizer every 6 hours  aspirin enteric coated 81 milliGRAM(s) Oral daily  atorvastatin 40 milliGRAM(s) Oral at bedtime  buDESOnide 160 MICROgram(s)/formoterol 4.5 MICROgram(s) Inhaler 2 Puff(s) Inhalation two times a day  buMETAnide 1 milliGRAM(s) Oral daily  chlorhexidine 4% Liquid 1 Application(s) Topical <User Schedule>  dextrose 5%. 1000 milliLiter(s) (50 mL/Hr) IV Continuous <Continuous>  dextrose 50% Injectable 12.5 Gram(s) IV Push once  dextrose 50% Injectable 25 Gram(s) IV Push once  dextrose 50% Injectable 25 Gram(s) IV Push once  docusate sodium 100 milliGRAM(s) Oral two times a day  gabapentin 100 milliGRAM(s) Oral two times a day  heparin  Injectable 5000 Unit(s) SubCutaneous every 8 hours  hydrALAZINE 20 milliGRAM(s) Oral every 8 hours  influenza   Vaccine 0.5 milliLiter(s) IntraMuscular once  insulin glargine Injectable (LANTUS) 65 Unit(s) SubCutaneous at bedtime  insulin lispro (HumaLOG) corrective regimen sliding scale   SubCutaneous three times a day before meals  insulin lispro (HumaLOG) corrective regimen sliding scale   SubCutaneous at bedtime  insulin lispro Injectable (HumaLOG) 30 Unit(s) SubCutaneous three times a day before meals  isosorbide   dinitrate Tablet (ISORDIL) 10 milliGRAM(s) Oral three times a day  levoFLOXacin  Tablet 500 milliGRAM(s) Oral every 48 hours  levothyroxine 50 MICROGram(s) Oral daily  montelukast 10 milliGRAM(s) Oral daily  pantoprazole    Tablet 40 milliGRAM(s) Oral before breakfast  polyethylene glycol 3350 17 Gram(s) Oral two times a day  predniSONE   Tablet 20 milliGRAM(s) Oral daily  senna 2 Tablet(s) Oral at bedtime  sodium chloride 0.65% Nasal 1 Spray(s) Both Nostrils three times a day  ticagrelor 90 milliGRAM(s) Oral two times a day    MEDICATIONS  (PRN):  acetaminophen   Tablet .. 650 milliGRAM(s) Oral every 6 hours PRN Mild Pain (1 - 3), Moderate Pain (4 - 6), Severe Pain (7 - 10)  ALBUTerol/ipratropium for Nebulization. 3 milliLiter(s) Nebulizer once PRN Shortness of Breath  dextrose 40% Gel 15 Gram(s) Oral once PRN Blood Glucose LESS THAN 70 milliGRAM(s)/deciliter  diphenhydrAMINE 25 milliGRAM(s) Oral every 4 hours PRN Rash and/or Itching  glucagon  Injectable 1 milliGRAM(s) IntraMuscular once PRN Glucose LESS THAN 70 milligrams/deciliter  ondansetron Injectable 4 milliGRAM(s) IV Push once PRN Nausea and/or Vomiting    ALLERGIES:  Allergies    azithromycin (Hives; Pruritus)    Intolerances    LABS:                        9.0    13.11 )-----------( 365      ( 01 May 2019 04:00 )             29.3     05-01    137  |  99  |  73<H>  ----------------------------<  250<H>  4.2   |  20<L>  |  2.26<H>    Ca    9.3      01 May 2019 07:45  Phos  4.3     05-01  Mg     2.6     05-01    TPro  8.2  /  Alb  3.9  /  TBili  0.4  /  DBili  x   /  AST  38<H>  /  ALT  14  /  AlkPhos  61  04-29    RADIOLOGY & ADDITIONAL TESTS: Reviewed. Yes-Patient/Caregiver accepts free interpretation services...

## 2021-01-19 NOTE — PROGRESS NOTE ADULT - SUBJECTIVE AND OBJECTIVE BOX
Subjective:      Medications:  aspirin enteric coated 81 milliGRAM(s) Oral daily  atorvastatin 80 milliGRAM(s) Oral at bedtime  calamine/zinc oxide Lotion 1 Application(s) Topical three times a day  clobetasol 0.05% Cream 1 Application(s) Topical every 12 hours  clopidogrel Tablet 75 milliGRAM(s) Oral daily  dextrose 40% Gel 15 Gram(s) Oral once  dextrose 5%. 1000 milliLiter(s) IV Continuous <Continuous>  dextrose 5%. 1000 milliLiter(s) IV Continuous <Continuous>  dextrose 50% Injectable 25 Gram(s) IV Push once  dextrose 50% Injectable 12.5 Gram(s) IV Push once  dextrose 50% Injectable 25 Gram(s) IV Push once  glucagon  Injectable 1 milliGRAM(s) IntraMuscular once  heparin   Injectable 5000 Unit(s) SubCutaneous every 8 hours  hydrALAZINE 25 milliGRAM(s) Oral every 8 hours  insulin glargine Injectable (LANTUS) 60 Unit(s) SubCutaneous at bedtime  insulin lispro (ADMELOG) corrective regimen sliding scale   SubCutaneous three times a day before meals  insulin lispro (ADMELOG) corrective regimen sliding scale   SubCutaneous at bedtime  insulin lispro Injectable (ADMELOG) 23 Unit(s) SubCutaneous three times a day before meals  levothyroxine Injectable 25 MICROGram(s) IV Push at bedtime  melatonin 3 milliGRAM(s) Oral at bedtime  metoprolol tartrate 37.5 milliGRAM(s) Oral every 8 hours  senna 2 Tablet(s) Oral at bedtime      ICU Vital Signs Last 24 Hrs  T(C): 36.4, Max: 36.5 ( @ 20:04)  HR: 80 (79 - 84)  BP: 105/61 (98/65 - 106/62)  BP(mean): --  ABP: --  ABP(mean): --  RR: 18 (18 - 19)  SpO2: 100% (94% - 100%)    Weight in k.2 (21)  Weight in k.3 (21)      I&O's Summary Last 24 Hrs    IN: 700 mL / OUT: 850 mL / NET: -150 mL      Tele:    Physical Exam:    General: No distress. Comfortable.  HEENT: EOM intact.  Neck: Neck supple. JVP not elevated. No masses  Chest: Clear to auscultation bilaterally  CV: Normal S1 and S2. No murmurs, rub, or gallops. Radial pulses normal.  Abdomen: Soft, non-distended, non-tender  Skin: No rashes or skin breakdown  Neurology: Alert and oriented times three. Sensation intact  Psych: Affect normal    Labs:    ( 21 @ 06:34 )               10.8   8.90  )--------( 406                  35.6     ( 21 @ 06:01 )     134  |  96  |  108  ---------------------<  255  4.1  |  20  |  2.47    Ca 10.2  Phos x   Mg x     ( 21 @ 06:01 )  TPro  8.3  /  Alb  3.9  /  TBili  0.4  /  DBili  x   /  AST  36  /  ALT  26  /  AlkPhos  103  ( 21 @ 18:03 )  TPro  8.0  /  Alb  3.9  /  TBili  0.3  /  DBili  x   /  AST  38  /  ALT  24  /  AlkPhos  105      ( 21 @ 05:14 )  TropHS 55    / CK x     / CKMB x      ( 21 @ 17:42 )  TropHS 56    / CK x     / CKMB x        Serum Pro-Brain Natriuretic Peptide: 5178 (21 @ 17:42)             Subjective:  - NAEO    Medications:  aspirin enteric coated 81 milliGRAM(s) Oral daily  atorvastatin 80 milliGRAM(s) Oral at bedtime  calamine/zinc oxide Lotion 1 Application(s) Topical three times a day  clobetasol 0.05% Cream 1 Application(s) Topical every 12 hours  clopidogrel Tablet 75 milliGRAM(s) Oral daily  dextrose 40% Gel 15 Gram(s) Oral once  dextrose 5%. 1000 milliLiter(s) IV Continuous <Continuous>  dextrose 5%. 1000 milliLiter(s) IV Continuous <Continuous>  dextrose 50% Injectable 25 Gram(s) IV Push once  dextrose 50% Injectable 12.5 Gram(s) IV Push once  dextrose 50% Injectable 25 Gram(s) IV Push once  glucagon  Injectable 1 milliGRAM(s) IntraMuscular once  heparin   Injectable 5000 Unit(s) SubCutaneous every 8 hours  hydrALAZINE 25 milliGRAM(s) Oral every 8 hours  insulin glargine Injectable (LANTUS) 60 Unit(s) SubCutaneous at bedtime  insulin lispro (ADMELOG) corrective regimen sliding scale   SubCutaneous three times a day before meals  insulin lispro (ADMELOG) corrective regimen sliding scale   SubCutaneous at bedtime  insulin lispro Injectable (ADMELOG) 23 Unit(s) SubCutaneous three times a day before meals  levothyroxine Injectable 25 MICROGram(s) IV Push at bedtime  melatonin 3 milliGRAM(s) Oral at bedtime  metoprolol tartrate 37.5 milliGRAM(s) Oral every 8 hours  senna 2 Tablet(s) Oral at bedtime      ICU Vital Signs Last 24 Hrs  T(C): 36.4, Max: 36.5 ( @ 20:04)  HR: 80 (79 - 84)  BP: 105/61 (98/65 - 106/62)  BP(mean): --  ABP: --  ABP(mean): --  RR: 18 (18 - 19)  SpO2: 100% (94% - 100%)    Weight in k.2 (21)  Weight in k.3 (21)      I&O's Summary Last 24 Hrs    IN: 700 mL / OUT: 850 mL / NET: -150 mL      Tele: SR 70-90s, briefly up to 120s    Physical Exam:    General: No distress. Comfortable.  HEENT: EOM intact.  Neck: Neck supple. JVP mildly elevated.  Chest: Clear to auscultation bilaterally  CV: RRR. Normal S1 and S2. No murmurs, rub, or gallops. Radial pulses normal.  Abdomen: Soft, non-distended, non-tender  Skin: No rashes or skin breakdown  Extremities: Warm, no edema  Neurology: Alert and oriented times three. Sensation intact  Psych: Affect normal    Labs:    ( 21 @ 06:34 )               10.8   8.90  )--------( 406                  35.6     ( 21 @ 06:01 )     134  |  96  |  108  ---------------------<  255  4.1  |  20  |  2.47    Ca 10.2  Phos x   Mg x     ( 21 @ 06:01 )  TPro  8.3  /  Alb  3.9  /  TBili  0.4  /  DBili  x   /  AST  36  /  ALT  26  /  AlkPhos  103  ( 21 @ 18:03 )  TPro  8.0  /  Alb  3.9  /  TBili  0.3  /  DBili  x   /  AST  38  /  ALT  24  /  AlkPhos  105    ( 21 @ 05:14 )  TropHS 55    / CK x     / CKMB x      ( 21 @ 17:42 )  TropHS 56    / CK x     / CKMB x        Serum Pro-Brain Natriuretic Peptide: 5178 (21 @ 17:42)

## 2021-01-19 NOTE — PROGRESS NOTE ADULT - PROBLEM SELECTOR PLAN 2
s/p clip; has component of mitral stenosis (mean gradient 8-9); will be impt to keep HR controlled  - pending repeat TTE

## 2021-01-20 DIAGNOSIS — E03.9 HYPOTHYROIDISM, UNSPECIFIED: ICD-10-CM

## 2021-01-20 LAB
ANION GAP SERPL CALC-SCNC: 19 MMOL/L — HIGH (ref 5–17)
APTT BLD: 43.5 SEC — HIGH (ref 27.5–35.5)
BUN SERPL-MCNC: 117 MG/DL — HIGH (ref 7–23)
CALCIUM SERPL-MCNC: 10.3 MG/DL — SIGNIFICANT CHANGE UP (ref 8.4–10.5)
CHLORIDE SERPL-SCNC: 96 MMOL/L — SIGNIFICANT CHANGE UP (ref 96–108)
CO2 SERPL-SCNC: 19 MMOL/L — LOW (ref 22–31)
CREAT SERPL-MCNC: 2.31 MG/DL — HIGH (ref 0.5–1.3)
GLUCOSE BLDC GLUCOMTR-MCNC: 268 MG/DL — HIGH (ref 70–99)
GLUCOSE BLDC GLUCOMTR-MCNC: 275 MG/DL — HIGH (ref 70–99)
GLUCOSE BLDC GLUCOMTR-MCNC: 415 MG/DL — HIGH (ref 70–99)
GLUCOSE BLDC GLUCOMTR-MCNC: 436 MG/DL — HIGH (ref 70–99)
GLUCOSE SERPL-MCNC: 241 MG/DL — HIGH (ref 70–99)
HCT VFR BLD CALC: 34.7 % — SIGNIFICANT CHANGE UP (ref 34.5–45)
HGB BLD-MCNC: 10.7 G/DL — LOW (ref 11.5–15.5)
INR BLD: 1.05 RATIO — SIGNIFICANT CHANGE UP (ref 0.88–1.16)
MCHC RBC-ENTMCNC: 26.4 PG — LOW (ref 27–34)
MCHC RBC-ENTMCNC: 30.8 GM/DL — LOW (ref 32–36)
MCV RBC AUTO: 85.5 FL — SIGNIFICANT CHANGE UP (ref 80–100)
NRBC # BLD: 0 /100 WBCS — SIGNIFICANT CHANGE UP (ref 0–0)
PLATELET # BLD AUTO: 452 K/UL — HIGH (ref 150–400)
POTASSIUM SERPL-MCNC: 4.5 MMOL/L — SIGNIFICANT CHANGE UP (ref 3.5–5.3)
POTASSIUM SERPL-SCNC: 4.5 MMOL/L — SIGNIFICANT CHANGE UP (ref 3.5–5.3)
PROTHROM AB SERPL-ACNC: 12.5 SEC — SIGNIFICANT CHANGE UP (ref 10.6–13.6)
RBC # BLD: 4.06 M/UL — SIGNIFICANT CHANGE UP (ref 3.8–5.2)
RBC # FLD: 16.7 % — HIGH (ref 10.3–14.5)
SODIUM SERPL-SCNC: 134 MMOL/L — LOW (ref 135–145)
WBC # BLD: 9.71 K/UL — SIGNIFICANT CHANGE UP (ref 3.8–10.5)
WBC # FLD AUTO: 9.71 K/UL — SIGNIFICANT CHANGE UP (ref 3.8–10.5)

## 2021-01-20 PROCEDURE — 93451 RIGHT HEART CATH: CPT | Mod: 26

## 2021-01-20 PROCEDURE — 99152 MOD SED SAME PHYS/QHP 5/>YRS: CPT

## 2021-01-20 PROCEDURE — 99233 SBSQ HOSP IP/OBS HIGH 50: CPT | Mod: GC

## 2021-01-20 RX ORDER — HYDRALAZINE HCL 50 MG
25 TABLET ORAL EVERY 8 HOURS
Refills: 0 | Status: DISCONTINUED | OUTPATIENT
Start: 2021-01-20 | End: 2021-01-29

## 2021-01-20 RX ORDER — INSULIN LISPRO 100/ML
26 VIAL (ML) SUBCUTANEOUS
Refills: 0 | Status: DISCONTINUED | OUTPATIENT
Start: 2021-01-20 | End: 2021-01-21

## 2021-01-20 RX ORDER — BUMETANIDE 0.25 MG/ML
2 INJECTION INTRAMUSCULAR; INTRAVENOUS
Refills: 0 | Status: DISCONTINUED | OUTPATIENT
Start: 2021-01-20 | End: 2021-01-22

## 2021-01-20 RX ORDER — ISOSORBIDE DINITRATE 5 MG/1
10 TABLET ORAL EVERY 8 HOURS
Refills: 0 | Status: DISCONTINUED | OUTPATIENT
Start: 2021-01-20 | End: 2021-01-20

## 2021-01-20 RX ORDER — INSULIN GLARGINE 100 [IU]/ML
65 INJECTION, SOLUTION SUBCUTANEOUS AT BEDTIME
Refills: 0 | Status: DISCONTINUED | OUTPATIENT
Start: 2021-01-20 | End: 2021-01-22

## 2021-01-20 RX ORDER — MILRINONE LACTATE 1 MG/ML
0.2 INJECTION, SOLUTION INTRAVENOUS
Qty: 20 | Refills: 0 | Status: DISCONTINUED | OUTPATIENT
Start: 2021-01-20 | End: 2021-01-21

## 2021-01-20 RX ADMIN — Medication 23 UNIT(S): at 12:59

## 2021-01-20 RX ADMIN — Medication 23 UNIT(S): at 07:36

## 2021-01-20 RX ADMIN — ISOSORBIDE DINITRATE 10 MILLIGRAM(S): 5 TABLET ORAL at 12:59

## 2021-01-20 RX ADMIN — Medication 8: at 21:30

## 2021-01-20 RX ADMIN — CALAMINE AND ZINC OXIDE AND PHENOL 1 APPLICATION(S): 160; 10 LOTION TOPICAL at 21:32

## 2021-01-20 RX ADMIN — SENNA PLUS 2 TABLET(S): 8.6 TABLET ORAL at 21:32

## 2021-01-20 RX ADMIN — Medication 6: at 12:59

## 2021-01-20 RX ADMIN — BUMETANIDE 2 MILLIGRAM(S): 0.25 INJECTION INTRAMUSCULAR; INTRAVENOUS at 21:31

## 2021-01-20 RX ADMIN — Medication 25 MICROGRAM(S): at 21:31

## 2021-01-20 RX ADMIN — Medication 6: at 07:36

## 2021-01-20 RX ADMIN — HEPARIN SODIUM 5000 UNIT(S): 5000 INJECTION INTRAVENOUS; SUBCUTANEOUS at 12:59

## 2021-01-20 RX ADMIN — Medication 25 MILLIGRAM(S): at 13:00

## 2021-01-20 RX ADMIN — INSULIN GLARGINE 65 UNIT(S): 100 INJECTION, SOLUTION SUBCUTANEOUS at 21:30

## 2021-01-20 RX ADMIN — Medication 25 MILLIGRAM(S): at 21:32

## 2021-01-20 RX ADMIN — ATORVASTATIN CALCIUM 80 MILLIGRAM(S): 80 TABLET, FILM COATED ORAL at 21:32

## 2021-01-20 RX ADMIN — HEPARIN SODIUM 5000 UNIT(S): 5000 INJECTION INTRAVENOUS; SUBCUTANEOUS at 21:32

## 2021-01-20 RX ADMIN — Medication 12: at 16:23

## 2021-01-20 RX ADMIN — Medication 3 MILLIGRAM(S): at 21:32

## 2021-01-20 RX ADMIN — Medication 1 APPLICATION(S): at 16:25

## 2021-01-20 RX ADMIN — Medication 26 UNIT(S): at 16:23

## 2021-01-20 RX ADMIN — Medication 100 MILLIGRAM(S): at 05:33

## 2021-01-20 RX ADMIN — Medication 1 APPLICATION(S): at 05:27

## 2021-01-20 RX ADMIN — HEPARIN SODIUM 5000 UNIT(S): 5000 INJECTION INTRAVENOUS; SUBCUTANEOUS at 05:28

## 2021-01-20 RX ADMIN — Medication 81 MILLIGRAM(S): at 12:59

## 2021-01-20 RX ADMIN — MILRINONE LACTATE 2.99 MICROGRAM(S)/KG/MIN: 1 INJECTION, SOLUTION INTRAVENOUS at 21:31

## 2021-01-20 RX ADMIN — CALAMINE AND ZINC OXIDE AND PHENOL 1 APPLICATION(S): 160; 10 LOTION TOPICAL at 05:27

## 2021-01-20 RX ADMIN — ISOSORBIDE DINITRATE 10 MILLIGRAM(S): 5 TABLET ORAL at 05:28

## 2021-01-20 RX ADMIN — CLOPIDOGREL BISULFATE 75 MILLIGRAM(S): 75 TABLET, FILM COATED ORAL at 12:59

## 2021-01-20 NOTE — CONSULT NOTE ADULT - SUBJECTIVE AND OBJECTIVE BOX
HPI:  75yF Armenian speaking h/o HTN, HLD, DM, CAD s/p CABG 2014 and prior PCI, severe mitral regurgitation (s/p mitral clip 2019), pulmonary HTN, HF (EF 21 % June 2019), CKD IV not on dialysis (b/l SCr 2-2.2), hypothyroidism BIBEMS 2/2 worsening work of breathing.   Per patient and daughter, patient has been experiencing increased work of breathing over the past few weeks, which has been getting progressively worse, being the worst yesterday causing family to call EMS. Patient states she "feels tired from breathing" and small activities such as eating or walking around her house make her very weak.   Patient denies any associated chest pain, nausea, vomiting, abdominal pain, diarrhea, constipation. Patient denies fevers, rhinorrhea, throat pain, sore throat, cough, sick contacts, COVID contacts, patient has been staying home for most of the time. Patient does endorse full body itching chronically and chronic RLE pain exacerbated with walking.   Patient's daughter and caretaker states that patient has been progressively deteriating. Cognitively she is all there however she gets weak with small activities such as walking or eating. Daughter states that patient appears to be weaker than during her last hospital admission in 2019, where her EF was estimated to be 21 % on TTE. Daughter's concern is that more than infection, this may be worsening of her mother's heart condition.                                                                                                   Per EMS: Patient found to have increase work of breathing at home sating 96 RA and given 3L NC en route.   Of note patient was admitted to CCU for acute decompensated HF in Oct 2019 with elevated trops to 1800, patient was diuresed, became hypotensive, eventually weaned off pressors and discharged on midodrine. TTE during that time (June 2019) showed showed EF 21%, Severe global left ventricular systolic dysfunction. Mild-moderate tricuspid regurgitation, and severe pulm hypertension.   ED course:   Vitals: afebrile, HR ~90s, -140/70-80s, RR up to 30-40s, SatO2 100 % on 15 L non-rebreather.   Labs: CBC significant for leukocytosis of 10.97, 74 % neutrophils, Hgb 11 (seems to be patient's baseline), plt 361. Coags showed PT of 13.7 and PTT 52, INR 1.15, D-dimer was 433 (elevated)   CMP wnl except HCO3 19, anion gap 20, BUN 60 with SCr 2.15 (seems to be patient's b/l)  Troponin was 56, pro-BNP 5178, it was 26,780 during patient's last hospitalization for ADHF   Procalcitonin 0.14  CRP 0.63  Lactate on VBG was 2.7. VBG showed pH 7.37 with pCO2 40.   U/A showed 30 proteinuria, and mod leuk esterase  Rapid COVID test negative x2   COVID PCR, blood cx, and urine cx were sent   CXR: Bilateral predominantly lower lobe hazy opacities. Pneumonia cannot be ruled out.  EKG: sinus tachy at , with non-specific ST and T wave changes   Patient given 1 dose Zosyn, 1g Vanco, and 1 L NS. Patient was placed on BiPAP for increased work of breathing and tachypnea.      (12 Jan 2021 23:20)  Patient known to Service from previous hospital admission, has history of diabetes, A1C 7.1%, was on basal bolus insulin at home.  Also takes synthroid 50mcg po daily for hypothyroidism, compliant with intake.  Endo was consulted for glycemic control.    PAST MEDICAL & SURGICAL HISTORY:  H/O pulmonary hypertension    Heart failure    CAD (coronary artery disease)  s/p CABG and PCI    Hypothyroidism    Hyperlipidemia    Diabetes mellitus, type 2    S/P mitral valve clip implantation    S/P CABG (coronary artery bypass graft)        FAMILY HISTORY:  Family history of hypertension (Sibling)  sister    Family history of diabetes mellitus (Sibling)  brothers/sisters        Social History:    Outpatient Medications:    MEDICATIONS  (STANDING):  aspirin enteric coated 81 milliGRAM(s) Oral daily  atorvastatin 80 milliGRAM(s) Oral at bedtime  calamine/zinc oxide Lotion 1 Application(s) Topical three times a day  clobetasol 0.05% Cream 1 Application(s) Topical every 12 hours  clopidogrel Tablet 75 milliGRAM(s) Oral daily  dextrose 40% Gel 15 Gram(s) Oral once  dextrose 5%. 1000 milliLiter(s) (50 mL/Hr) IV Continuous <Continuous>  dextrose 5%. 1000 milliLiter(s) (100 mL/Hr) IV Continuous <Continuous>  dextrose 50% Injectable 25 Gram(s) IV Push once  dextrose 50% Injectable 12.5 Gram(s) IV Push once  dextrose 50% Injectable 25 Gram(s) IV Push once  glucagon  Injectable 1 milliGRAM(s) IntraMuscular once  heparin   Injectable 5000 Unit(s) SubCutaneous every 8 hours  hydrALAZINE 25 milliGRAM(s) Oral every 8 hours  insulin glargine Injectable (LANTUS) 65 Unit(s) SubCutaneous at bedtime  insulin lispro (ADMELOG) corrective regimen sliding scale   SubCutaneous three times a day before meals  insulin lispro (ADMELOG) corrective regimen sliding scale   SubCutaneous at bedtime  insulin lispro Injectable (ADMELOG) 26 Unit(s) SubCutaneous three times a day before meals  isosorbide   dinitrate Tablet (ISORDIL) 10 milliGRAM(s) Oral every 8 hours  levothyroxine Injectable 25 MICROGram(s) IV Push at bedtime  melatonin 3 milliGRAM(s) Oral at bedtime  metoprolol succinate  milliGRAM(s) Oral daily  senna 2 Tablet(s) Oral at bedtime    MEDICATIONS  (PRN):      Allergies    azithromycin (Hives; Pruritus)    Intolerances      Review of Systems:  Constitutional: No fever, no chills  Eyes: No blurry vision  Neuro: No tremors  HEENT: No pain, no neck swelling  Cardiovascular: No chest pain, no palpitations  Respiratory: Has SOB, no cough  GI: No nausea, vomiting, abdominal pain  : No dysuria  Skin: no rash  MSK: Has leg swelling.  Psych: no depression  Endocrine: no polyuria, polydipsia    ALL OTHER SYSTEMS REVIEWED AND NEGATIVE    UNABLE TO OBTAIN    PHYSICAL EXAM:  VITALS: T(C): 36.7 (01-20-21 @ 12:57)  T(F): 98 (01-20-21 @ 12:57), Max: 98 (01-20-21 @ 09:23)  HR: 72 (01-20-21 @ 14:08) (70 - 86)  BP: 98/61 (01-20-21 @ 14:08) (92/55 - 115/56)  RR:  (16 - 20)  SpO2:  (94% - 100%)  Wt(kg): --  GENERAL: NAD, well-groomed, well-developed  EYES: No proptosis, no lid lag  HEENT:  Atraumatic, Normocephalic  THYROID: Normal size, no palpable nodules  RESPIRATORY: Clear to auscultation bilaterally; No rales, rhonchi, wheezing  CARDIOVASCULAR: Si S2, No murmurs;  GI: Soft, non distended, normal bowel sounds  SKIN: Dry, intact, No rashes or lesions  MUSCULOSKELETAL: Has BL lower extremity edema.  NEURO:  no tremor, sensation decreased in feet BL,    POCT Blood Glucose.: 275 mg/dL (01-20-21 @ 12:50)  POCT Blood Glucose.: 268 mg/dL (01-20-21 @ 07:31)  POCT Blood Glucose.: 354 mg/dL (01-19-21 @ 21:04)  POCT Blood Glucose.: 326 mg/dL (01-19-21 @ 16:24)  POCT Blood Glucose.: 376 mg/dL (01-19-21 @ 11:40)  POCT Blood Glucose.: 274 mg/dL (01-19-21 @ 07:20)  POCT Blood Glucose.: 387 mg/dL (01-18-21 @ 22:21)  POCT Blood Glucose.: 402 mg/dL (01-18-21 @ 22:20)  POCT Blood Glucose.: 473 mg/dL (01-18-21 @ 21:02)  POCT Blood Glucose.: 491 mg/dL (01-18-21 @ 21:00)  POCT Blood Glucose.: 388 mg/dL (01-18-21 @ 16:27)  POCT Blood Glucose.: 353 mg/dL (01-18-21 @ 11:37)  POCT Blood Glucose.: 242 mg/dL (01-18-21 @ 07:23)  POCT Blood Glucose.: 328 mg/dL (01-17-21 @ 21:05)  POCT Blood Glucose.: 314 mg/dL (01-17-21 @ 16:23)                            10.7   9.71  )-----------( 452      ( 20 Jan 2021 06:01 )             34.7       01-20    134<L>  |  96  |  117<H>  ----------------------------<  241<H>  4.5   |  19<L>  |  2.31<H>    EGFR if : 24<L>  EGFR if non : 20<L>    Ca    10.3      01-20  Mg     2.3     01-18  Phos  5.3     01-18    TPro  8.3  /  Alb  3.9  /  TBili  0.4  /  DBili  x   /  AST  36  /  ALT  26  /  AlkPhos  103  01-19      Thyroid Function Tests:  01-13 @ 10:07 TSH 2.15 FreeT4 -- T3 -- Anti TPO -- Anti Thyroglobulin Ab -- TSI --              Radiology:                HPI:  75yF Frisian speaking h/o HTN, HLD, DM, CAD s/p CABG 2014 and prior PCI, severe mitral regurgitation (s/p mitral clip 2019), pulmonary HTN, HF (EF 21 % June 2019), CKD IV not on dialysis (b/l SCr 2-2.2), hypothyroidism BIBEMS 2/2 worsening work of breathing.   Per patient and daughter, patient has been experiencing increased work of breathing over the past few weeks, which has been getting progressively worse, being the worst yesterday causing family to call EMS. Patient states she "feels tired from breathing" and small activities such as eating or walking around her house make her very weak.   Patient denies any associated chest pain, nausea, vomiting, abdominal pain, diarrhea, constipation. Patient denies fevers, rhinorrhea, throat pain, sore throat, cough, sick contacts, COVID contacts, patient has been staying home for most of the time. Patient does endorse full body itching chronically and chronic RLE pain exacerbated with walking.   Patient's daughter and caretaker states that patient has been progressively deteriating. Cognitively she is all there however she gets weak with small activities such as walking or eating. Daughter states that patient appears to be weaker than during her last hospital admission in 2019, where her EF was estimated to be 21 % on TTE. Daughter's concern is that more than infection, this may be worsening of her mother's heart condition.                                                                                                   Per EMS: Patient found to have increase work of breathing at home sating 96 RA and given 3L NC en route.   Of note patient was admitted to CCU for acute decompensated HF in Oct 2019 with elevated trops to 1800, patient was diuresed, became hypotensive, eventually weaned off pressors and discharged on midodrine. TTE during that time (June 2019) showed showed EF 21%, Severe global left ventricular systolic dysfunction. Mild-moderate tricuspid regurgitation, and severe pulm hypertension.   ED course:   Vitals: afebrile, HR ~90s, -140/70-80s, RR up to 30-40s, SatO2 100 % on 15 L non-rebreather.   Labs: CBC significant for leukocytosis of 10.97, 74 % neutrophils, Hgb 11 (seems to be patient's baseline), plt 361. Coags showed PT of 13.7 and PTT 52, INR 1.15, D-dimer was 433 (elevated)   CMP wnl except HCO3 19, anion gap 20, BUN 60 with SCr 2.15 (seems to be patient's b/l)  Troponin was 56, pro-BNP 5178, it was 26,780 during patient's last hospitalization for ADHF   Procalcitonin 0.14  CRP 0.63  Lactate on VBG was 2.7. VBG showed pH 7.37 with pCO2 40.   U/A showed 30 proteinuria, and mod leuk esterase  Rapid COVID test negative x2   COVID PCR, blood cx, and urine cx were sent   CXR: Bilateral predominantly lower lobe hazy opacities. Pneumonia cannot be ruled out.  EKG: sinus tachy at , with non-specific ST and T wave changes   Patient given 1 dose Zosyn, 1g Vanco, and 1 L NS. Patient was placed on BiPAP for increased work of breathing and tachypnea.      (12 Jan 2021 23:20)  Patient known to Service from previous hospital admission, has history of diabetes, A1C 7.1%, was on basal bolus insulin at home.  Also takes synthroid 50mcg po daily for hypothyroidism, compliant with intake.  Endo was consulted for glycemic control.    PAST MEDICAL & SURGICAL HISTORY:  H/O pulmonary hypertension    Heart failure    CAD (coronary artery disease)  s/p CABG and PCI    Hypothyroidism    Hyperlipidemia    Diabetes mellitus, type 2    S/P mitral valve clip implantation    S/P CABG (coronary artery bypass graft)        FAMILY HISTORY:  Family history of hypertension (Sibling)  sister    Family history of diabetes mellitus (Sibling)  brothers/sisters        Social History:    Outpatient Medications:    MEDICATIONS  (STANDING):  aspirin enteric coated 81 milliGRAM(s) Oral daily  atorvastatin 80 milliGRAM(s) Oral at bedtime  calamine/zinc oxide Lotion 1 Application(s) Topical three times a day  clobetasol 0.05% Cream 1 Application(s) Topical every 12 hours  clopidogrel Tablet 75 milliGRAM(s) Oral daily  dextrose 40% Gel 15 Gram(s) Oral once  dextrose 5%. 1000 milliLiter(s) (50 mL/Hr) IV Continuous <Continuous>  dextrose 5%. 1000 milliLiter(s) (100 mL/Hr) IV Continuous <Continuous>  dextrose 50% Injectable 25 Gram(s) IV Push once  dextrose 50% Injectable 12.5 Gram(s) IV Push once  dextrose 50% Injectable 25 Gram(s) IV Push once  glucagon  Injectable 1 milliGRAM(s) IntraMuscular once  heparin   Injectable 5000 Unit(s) SubCutaneous every 8 hours  hydrALAZINE 25 milliGRAM(s) Oral every 8 hours  insulin glargine Injectable (LANTUS) 65 Unit(s) SubCutaneous at bedtime  insulin lispro (ADMELOG) corrective regimen sliding scale   SubCutaneous three times a day before meals  insulin lispro (ADMELOG) corrective regimen sliding scale   SubCutaneous at bedtime  insulin lispro Injectable (ADMELOG) 26 Unit(s) SubCutaneous three times a day before meals  isosorbide   dinitrate Tablet (ISORDIL) 10 milliGRAM(s) Oral every 8 hours  levothyroxine Injectable 25 MICROGram(s) IV Push at bedtime  melatonin 3 milliGRAM(s) Oral at bedtime  metoprolol succinate  milliGRAM(s) Oral daily  senna 2 Tablet(s) Oral at bedtime    MEDICATIONS  (PRN):      Allergies    azithromycin (Hives; Pruritus)    Intolerances      Review of Systems:  Constitutional: No fever, no chills  Eyes: No blurry vision  Neuro: No tremors  HEENT: No pain, no neck swelling  Cardiovascular: No chest pain, no palpitations  Respiratory: Has SOB, no cough  GI: No nausea, vomiting, abdominal pain  : No dysuria  Skin: no rash  MSK: Has leg swelling.  Psych: no depression  Endocrine: no polyuria, polydipsia    ALL OTHER SYSTEMS REVIEWED AND NEGATIVE    UNABLE TO OBTAIN    PHYSICAL EXAM:  VITALS: T(C): 36.7 (01-20-21 @ 12:57)  T(F): 98 (01-20-21 @ 12:57), Max: 98 (01-20-21 @ 09:23)  HR: 72 (01-20-21 @ 14:08) (70 - 86)  BP: 98/61 (01-20-21 @ 14:08) (92/55 - 115/56)  RR:  (16 - 20)  SpO2:  (94% - 100%)  Wt(kg): --  GENERAL: NAD, well-groomed, well-developed  EYES: No proptosis, no lid lag  HEENT:  Atraumatic, Normocephalic  THYROID: Normal size, no palpable nodules  RESPIRATORY: Clear to auscultation bilaterally; No rales, rhonchi, wheezing  CARDIOVASCULAR: Si S2, No murmurs;  GI: Soft, non distended, normal bowel sounds  SKIN: Dry, intact, No rashes or lesions  MUSCULOSKELETAL: Has BL lower extremity edema.  NEURO:  no tremor, sensation decreased in feet BL,    POCT Blood Glucose.: 275 mg/dL (01-20-21 @ 12:50)  POCT Blood Glucose.: 268 mg/dL (01-20-21 @ 07:31)  POCT Blood Glucose.: 354 mg/dL (01-19-21 @ 21:04)  POCT Blood Glucose.: 326 mg/dL (01-19-21 @ 16:24)  POCT Blood Glucose.: 376 mg/dL (01-19-21 @ 11:40)  POCT Blood Glucose.: 274 mg/dL (01-19-21 @ 07:20)  POCT Blood Glucose.: 387 mg/dL (01-18-21 @ 22:21)  POCT Blood Glucose.: 402 mg/dL (01-18-21 @ 22:20)  POCT Blood Glucose.: 473 mg/dL (01-18-21 @ 21:02)  POCT Blood Glucose.: 491 mg/dL (01-18-21 @ 21:00)  POCT Blood Glucose.: 388 mg/dL (01-18-21 @ 16:27)  POCT Blood Glucose.: 353 mg/dL (01-18-21 @ 11:37)  POCT Blood Glucose.: 242 mg/dL (01-18-21 @ 07:23)  POCT Blood Glucose.: 328 mg/dL (01-17-21 @ 21:05)  POCT Blood Glucose.: 314 mg/dL (01-17-21 @ 16:23)                            10.7   9.71  )-----------( 452      ( 20 Jan 2021 06:01 )             34.7       01-20    134<L>  |  96  |  117<H>  ----------------------------<  241<H>  4.5   |  19<L>  |  2.31<H>    EGFR if : 24<L>  EGFR if non : 20<L>    Ca    10.3      01-20  Mg     2.3     01-18  Phos  5.3     01-18    TPro  8.3  /  Alb  3.9  /  TBili  0.4  /  DBili  x   /  AST  36  /  ALT  26  /  AlkPhos  103  01-19      Thyroid Function Tests:  01-13 @ 10:07 TSH 2.15 FreeT4 -- T3 -- Anti TPO -- Anti Thyroglobulin Ab -- TSI --              Radiology:

## 2021-01-20 NOTE — DIETITIAN INITIAL EVALUATION ADULT. - PERTINENT MEDS FT
MEDICATIONS  (STANDING):  aspirin enteric coated 81 milliGRAM(s) Oral daily  atorvastatin 80 milliGRAM(s) Oral at bedtime  calamine/zinc oxide Lotion 1 Application(s) Topical three times a day  clobetasol 0.05% Cream 1 Application(s) Topical every 12 hours  clopidogrel Tablet 75 milliGRAM(s) Oral daily  dextrose 40% Gel 15 Gram(s) Oral once  dextrose 5%. 1000 milliLiter(s) (50 mL/Hr) IV Continuous <Continuous>  dextrose 5%. 1000 milliLiter(s) (100 mL/Hr) IV Continuous <Continuous>  dextrose 50% Injectable 25 Gram(s) IV Push once  dextrose 50% Injectable 12.5 Gram(s) IV Push once  dextrose 50% Injectable 25 Gram(s) IV Push once  glucagon  Injectable 1 milliGRAM(s) IntraMuscular once  heparin   Injectable 5000 Unit(s) SubCutaneous every 8 hours  hydrALAZINE 25 milliGRAM(s) Oral every 8 hours  insulin glargine Injectable (LANTUS) 65 Unit(s) SubCutaneous at bedtime  insulin lispro (ADMELOG) corrective regimen sliding scale   SubCutaneous three times a day before meals  insulin lispro (ADMELOG) corrective regimen sliding scale   SubCutaneous at bedtime  insulin lispro Injectable (ADMELOG) 26 Unit(s) SubCutaneous three times a day before meals  isosorbide   dinitrate Tablet (ISORDIL) 10 milliGRAM(s) Oral every 8 hours  levothyroxine Injectable 25 MICROGram(s) IV Push at bedtime  melatonin 3 milliGRAM(s) Oral at bedtime  metoprolol succinate  milliGRAM(s) Oral daily  senna 2 Tablet(s) Oral at bedtime

## 2021-01-20 NOTE — DIETITIAN INITIAL EVALUATION ADULT. - REASON INDICATOR FOR ASSESSMENT
Pt seen for routine length of stay  Information obtained from: pt (Polish speaking,  used: Samara ID #[id number]), pr daughter (Elsa) via phone, electronic medical record

## 2021-01-20 NOTE — CONSULT NOTE ADULT - PROBLEM SELECTOR PROBLEM 1
Type 2 diabetes mellitus with diabetic nephropathy, with long-term current use of insulin
Chronic systolic heart failure

## 2021-01-20 NOTE — CONSULT NOTE ADULT - PROBLEM SELECTOR RECOMMENDATION 3
Suggest to continue medications, monitoring, FU primary team/Cardiology recommendations.
-Continue ASA, Plavix, high intensity statin and BB.

## 2021-01-20 NOTE — CONSULT NOTE ADULT - ASSESSMENT
Assessment  DMT2: 75y Female with DM T2 with hyperglycemia, A1C 7.1%, was on insulin at home, now on basal bolus insulin, blood sugars running high and not at target, no hypoglycemic episodes, eating full meals with good appetite.  HF: on medications, stable, monitored, FU Cardiology.  Hypothyroidism: On Synthroid 50 mcg po daily, compliant with Synthroid intake, asymptomatic, euthyroid.  CKD: Monitor labs/BMP      Jillian Banks MD  Cell: [phone]  Office: [phone]       Assessment  DMT2: 75y Female with DM T2 with hyperglycemia, A1C 7.1%, was on insulin at home, now on basal bolus insulin, blood sugars  running high and not at target, no hypoglycemic episodes, eating full meals with good appetite.  HF: on medications, stable, monitored, FU Cardiology.  Hypothyroidism: On Synthroid 50 mcg po daily, compliant with Synthroid intake, asymptomatic, euthyroid.  CKD: Monitor labs/BMP      Jillian Banks MD  Cell: [phone]  Office: [phone]

## 2021-01-20 NOTE — PROGRESS NOTE ADULT - ATTENDING COMMENTS
Patient seen and examined, agree with above assessment and plan as transcribed above.    - Appears euvolemic,   - Filling pressures are appropriate considering LV function and the mitral valve gradient  - Cont Beta blockers and after load reduction  - plan to restart PO bumex prior to D/C     Vargas Adame MD, [facility]  BEEPER [phone]

## 2021-01-20 NOTE — DIETITIAN INITIAL EVALUATION ADULT. - LITERATURE/VIDEOS GIVEN
Heart Failure Nutrition Therapy, Low Sodium Food List, Heart Healthy Shopping Tips; Heart Healthy Reading Nutrition Facts Labels from the Nutrition Care Manual provided at time of RD visit 1/14

## 2021-01-20 NOTE — CONSULT NOTE ADULT - REASON FOR ADMISSION
increased work of breathing

## 2021-01-20 NOTE — DIETITIAN INITIAL EVALUATION ADULT. - ADD RECOMMEND
1. Can continue Consistent Carbohydrate (with evening snacks) and DASH/TLC therapeutic diet as indicated, Halal per pt preference, fluids per team 2. Promote adequate BG control, recommend adjust insulin as needed. Consider endocrinology consult if POCT remains elevated. 3. Can continue to provide 3 diet mighty health shakes daily 4. Provide/review nutrition education as indicated 5. Will continue to monitor nutrient intake, wt, labs, f/u per protocol

## 2021-01-20 NOTE — PROGRESS NOTE ADULT - SUBJECTIVE AND OBJECTIVE BOX
PROGRESS NOTE:   Authored by Biju Ayon MD      Patient is a 75y old  Female who presents with a chief complaint of increased work of breathing (19 Jan 2021 16:45)      SUBJECTIVE / OVERNIGHT EVENTS:    ADDITIONAL REVIEW OF SYSTEMS:    MEDICATIONS  (STANDING):  aspirin enteric coated 81 milliGRAM(s) Oral daily  atorvastatin 80 milliGRAM(s) Oral at bedtime  calamine/zinc oxide Lotion 1 Application(s) Topical three times a day  clobetasol 0.05% Cream 1 Application(s) Topical every 12 hours  clopidogrel Tablet 75 milliGRAM(s) Oral daily  dextrose 40% Gel 15 Gram(s) Oral once  dextrose 5%. 1000 milliLiter(s) (50 mL/Hr) IV Continuous <Continuous>  dextrose 5%. 1000 milliLiter(s) (100 mL/Hr) IV Continuous <Continuous>  dextrose 50% Injectable 25 Gram(s) IV Push once  dextrose 50% Injectable 12.5 Gram(s) IV Push once  dextrose 50% Injectable 25 Gram(s) IV Push once  glucagon  Injectable 1 milliGRAM(s) IntraMuscular once  heparin   Injectable 5000 Unit(s) SubCutaneous every 8 hours  hydrALAZINE 25 milliGRAM(s) Oral every 8 hours  insulin glargine Injectable (LANTUS) 60 Unit(s) SubCutaneous at bedtime  insulin lispro (ADMELOG) corrective regimen sliding scale   SubCutaneous three times a day before meals  insulin lispro (ADMELOG) corrective regimen sliding scale   SubCutaneous at bedtime  insulin lispro Injectable (ADMELOG) 23 Unit(s) SubCutaneous three times a day before meals  isosorbide   dinitrate Tablet (ISORDIL) 10 milliGRAM(s) Oral every 8 hours  levothyroxine Injectable 25 MICROGram(s) IV Push at bedtime  melatonin 3 milliGRAM(s) Oral at bedtime  metoprolol succinate  milliGRAM(s) Oral daily  senna 2 Tablet(s) Oral at bedtime    MEDICATIONS  (PRN):      CAPILLARY BLOOD GLUCOSE      POCT Blood Glucose.: 268 mg/dL (20 Jan 2021 07:31)  POCT Blood Glucose.: 354 mg/dL (19 Jan 2021 21:04)  POCT Blood Glucose.: 326 mg/dL (19 Jan 2021 16:24)  POCT Blood Glucose.: 376 mg/dL (19 Jan 2021 11:40)    I&O's Summary    19 Jan 2021 07:01  -  20 Jan 2021 07:00  --------------------------------------------------------  IN: 700 mL / OUT: 800 mL / NET: -100 mL    20 Jan 2021 07:01  -  20 Jan 2021 11:14  --------------------------------------------------------  IN: 220 mL / OUT: 0 mL / NET: 220 mL        PHYSICAL EXAM:  Vital Signs Last 24 Hrs  T(C): 36.7 (20 Jan 2021 09:23), Max: 36.7 (20 Jan 2021 09:23)  T(F): 98 (20 Jan 2021 09:23), Max: 98 (20 Jan 2021 09:23)  HR: 83 (20 Jan 2021 09:23) (71 - 86)  BP: 103/52 (20 Jan 2021 09:23) (92/55 - 113/67)  BP(mean): --  RR: 18 (20 Jan 2021 08:55) (18 - 20)  SpO2: 96% (20 Jan 2021 08:55) (95% - 100%)    GENERAL: No acute distress, well-developed  HEAD:  Atraumatic, Normocephalic  EYES: EOMI, PERRLA, conjunctiva and sclera clear  NECK: Supple, no lymphadenopathy, no JVD  CHEST/LUNG: CTAB; No wheezes, rales, or rhonchi  HEART: Regular rate and rhythm; No murmurs, rubs, or gallops  ABDOMEN: Soft, non-tender, non-distended; normal bowel sounds, no organomegaly  EXTREMITIES:  2+ peripheral pulses b/l, No clubbing, cyanosis, or edema  NEUROLOGY: A&O x 3, no focal deficits  SKIN: No rashes or lesions    LABS:                        10.7   9.71  )-----------( 452      ( 20 Jan 2021 06:01 )             34.7     01-20    134<L>  |  96  |  117<H>  ----------------------------<  241<H>  4.5   |  19<L>  |  2.31<H>    Ca    10.3      20 Jan 2021 06:00    TPro  8.3  /  Alb  3.9  /  TBili  0.4  /  DBili  x   /  AST  36  /  ALT  26  /  AlkPhos  103  01-19    PT/INR - ( 20 Jan 2021 07:40 )   PT: 12.5 sec;   INR: 1.05 ratio         PTT - ( 20 Jan 2021 07:40 )  PTT:43.5 sec            RADIOLOGY & ADDITIONAL TESTS:  Results Reviewed:   Imaging Personally Reviewed:  Electrocardiogram Personally Reviewed:    COORDINATION OF CARE:  Care Discussed with Consultants/Other Providers [Y/N]:  Prior or Outpatient Records Reviewed [Y/N]:   PROGRESS NOTE:   Authored by Carole Vieyra MD     Patient is a 75y old  Female who presents with a chief complaint of increased work of breathing (19 Jan 2021 16:45)      SUBJECTIVE / OVERNIGHT EVENTS:  Patient endorsed continue SOB. She denied chest pain.     ADDITIONAL REVIEW OF SYSTEMS:    MEDICATIONS  (STANDING):  aspirin enteric coated 81 milliGRAM(s) Oral daily  atorvastatin 80 milliGRAM(s) Oral at bedtime  calamine/zinc oxide Lotion 1 Application(s) Topical three times a day  clobetasol 0.05% Cream 1 Application(s) Topical every 12 hours  clopidogrel Tablet 75 milliGRAM(s) Oral daily  dextrose 40% Gel 15 Gram(s) Oral once  dextrose 5%. 1000 milliLiter(s) (50 mL/Hr) IV Continuous <Continuous>  dextrose 5%. 1000 milliLiter(s) (100 mL/Hr) IV Continuous <Continuous>  dextrose 50% Injectable 25 Gram(s) IV Push once  dextrose 50% Injectable 12.5 Gram(s) IV Push once  dextrose 50% Injectable 25 Gram(s) IV Push once  glucagon  Injectable 1 milliGRAM(s) IntraMuscular once  heparin   Injectable 5000 Unit(s) SubCutaneous every 8 hours  hydrALAZINE 25 milliGRAM(s) Oral every 8 hours  insulin glargine Injectable (LANTUS) 60 Unit(s) SubCutaneous at bedtime  insulin lispro (ADMELOG) corrective regimen sliding scale   SubCutaneous three times a day before meals  insulin lispro (ADMELOG) corrective regimen sliding scale   SubCutaneous at bedtime  insulin lispro Injectable (ADMELOG) 23 Unit(s) SubCutaneous three times a day before meals  isosorbide   dinitrate Tablet (ISORDIL) 10 milliGRAM(s) Oral every 8 hours  levothyroxine Injectable 25 MICROGram(s) IV Push at bedtime  melatonin 3 milliGRAM(s) Oral at bedtime  metoprolol succinate  milliGRAM(s) Oral daily  senna 2 Tablet(s) Oral at bedtime    MEDICATIONS  (PRN):      CAPILLARY BLOOD GLUCOSE      POCT Blood Glucose.: 268 mg/dL (20 Jan 2021 07:31)  POCT Blood Glucose.: 354 mg/dL (19 Jan 2021 21:04)  POCT Blood Glucose.: 326 mg/dL (19 Jan 2021 16:24)  POCT Blood Glucose.: 376 mg/dL (19 Jan 2021 11:40)    I&O's Summary    19 Jan 2021 07:01  -  20 Jan 2021 07:00  --------------------------------------------------------  IN: 700 mL / OUT: 800 mL / NET: -100 mL    20 Jan 2021 07:01  -  20 Jan 2021 11:14  --------------------------------------------------------  IN: 220 mL / OUT: 0 mL / NET: 220 mL        PHYSICAL EXAM:  Vital Signs Last 24 Hrs  T(C): 36.7 (20 Jan 2021 09:23), Max: 36.7 (20 Jan 2021 09:23)  T(F): 98 (20 Jan 2021 09:23), Max: 98 (20 Jan 2021 09:23)  HR: 83 (20 Jan 2021 09:23) (71 - 86)  BP: 103/52 (20 Jan 2021 09:23) (92/55 - 113/67)  BP(mean): --  RR: 18 (20 Jan 2021 08:55) (18 - 20)  SpO2: 96% (20 Jan 2021 08:55) (95% - 100%)    GENERAL: No acute distress, well-developed  HEAD:  Atraumatic, Normocephalic  EYES: EOMI, PERRLA, conjunctiva and sclera clear  NECK: Supple, no lymphadenopathy, + JVD  CHEST/LUNG: CTAB; No wheezes, rales, or rhonchi  HEART: Regular rate and rhythm; No murmurs, rubs, or gallops  ABDOMEN: Soft, non-tender, non-distended; normal bowel sounds, no organomegaly  EXTREMITIES:  2+ peripheral pulses b/l, No clubbing, cyanosis, or edema  NEUROLOGY: A&O x 3, no focal deficits  SKIN: No rashes or lesions    LABS:                        10.7   9.71  )-----------( 452      ( 20 Jan 2021 06:01 )             34.7     01-20    134<L>  |  96  |  117<H>  ----------------------------<  241<H>  4.5   |  19<L>  |  2.31<H>    Ca    10.3      20 Jan 2021 06:00    TPro  8.3  /  Alb  3.9  /  TBili  0.4  /  DBili  x   /  AST  36  /  ALT  26  /  AlkPhos  103  01-19    PT/INR - ( 20 Jan 2021 07:40 )   PT: 12.5 sec;   INR: 1.05 ratio         PTT - ( 20 Jan 2021 07:40 )  PTT:43.5 sec            RADIOLOGY & ADDITIONAL TESTS:  Results Reviewed:   Imaging Personally Reviewed:  Electrocardiogram Personally Reviewed:    COORDINATION OF CARE:  Care Discussed with Consultants/Other Providers [Y/N]:  Prior or Outpatient Records Reviewed [Y/N]:

## 2021-01-20 NOTE — PROGRESS NOTE ADULT - SUBJECTIVE AND OBJECTIVE BOX
S: Less SOB currently on room air, denies chest pain. Review of systems otherwise (-)    Review of Systems:   Constitutional: [ ] fevers, [ ] chills.   Skin: [ ] dry skin. [ ] rashes.  Psychiatric: [ ] depression, [ ] anxiety.   Gastrointestinal: [ ] BRBPR, [ ] melena.   Neurological: [ ] confusion. [ ] seizures. [ ] shuffling gait.   Ears,Nose,Mouth and Throat: [ ] ear pain [ ] sore throat.   Eyes: [ ] diplopia.   Respiratory: [ ] hemoptysis. [ ] shortness of breath  Cardiovascular: See HPI above  Hematologic/Lymphatic: [ ] anemia. [ ] painful nodes. [ ] prolonged bleeding.   Genitourinary: [ ] hematuria. [ ] flank pain.   Endocrine: [ ] significant change in weight. [ ] intolerance to heat and cold.     Review of systems [ x] otherwise negative, [ ] otherwise unable to obtain    FH: no family history of sudden cardiac death in first degree relatives    SH: [ ] tobacco, [ ] alcohol, [ ] drugs      MEDICATIONS  (STANDING):  aspirin enteric coated 81 milliGRAM(s) Oral daily  atorvastatin 80 milliGRAM(s) Oral at bedtime  calamine/zinc oxide Lotion 1 Application(s) Topical three times a day  clobetasol 0.05% Cream 1 Application(s) Topical every 12 hours  clopidogrel Tablet 75 milliGRAM(s) Oral daily  dextrose 40% Gel 15 Gram(s) Oral once  dextrose 5%. 1000 milliLiter(s) (50 mL/Hr) IV Continuous <Continuous>  dextrose 5%. 1000 milliLiter(s) (100 mL/Hr) IV Continuous <Continuous>  dextrose 50% Injectable 25 Gram(s) IV Push once  dextrose 50% Injectable 12.5 Gram(s) IV Push once  dextrose 50% Injectable 25 Gram(s) IV Push once  glucagon  Injectable 1 milliGRAM(s) IntraMuscular once  heparin   Injectable 5000 Unit(s) SubCutaneous every 8 hours  hydrALAZINE 25 milliGRAM(s) Oral every 8 hours  insulin glargine Injectable (LANTUS) 65 Unit(s) SubCutaneous at bedtime  insulin lispro (ADMELOG) corrective regimen sliding scale   SubCutaneous three times a day before meals  insulin lispro (ADMELOG) corrective regimen sliding scale   SubCutaneous at bedtime  insulin lispro Injectable (ADMELOG) 26 Unit(s) SubCutaneous three times a day before meals  isosorbide   dinitrate Tablet (ISORDIL) 10 milliGRAM(s) Oral every 8 hours  levothyroxine Injectable 25 MICROGram(s) IV Push at bedtime  melatonin 3 milliGRAM(s) Oral at bedtime  metoprolol succinate  milliGRAM(s) Oral daily  senna 2 Tablet(s) Oral at bedtime    MEDICATIONS  (PRN):      LABS:                          10.7   9.71  )-----------( 452      ( 20 Jan 2021 06:01 )             34.7     Hemoglobin: 10.7 g/dL (01-20 @ 06:01)  Hemoglobin: 10.8 g/dL (01-18 @ 06:34)  Hemoglobin: 10.1 g/dL (01-17 @ 06:58)  Hemoglobin: 9.1 g/dL (01-16 @ 06:40)    01-20    134<L>  |  96  |  117<H>  ----------------------------<  241<H>  4.5   |  19<L>  |  2.31<H>    Ca    10.3      20 Jan 2021 06:00    TPro  8.3  /  Alb  3.9  /  TBili  0.4  /  DBili  x   /  AST  36  /  ALT  26  /  AlkPhos  103  01-19    Creatinine Trend: 2.31<--, 2.47<--, 2.54<--, 2.39<--, 2.22<--, 2.07<--   PT/INR - ( 20 Jan 2021 07:40 )   PT: 12.5 sec;   INR: 1.05 ratio         PTT - ( 20 Jan 2021 07:40 )  PTT:43.5 sec          01-19-21 @ 07:01  -  01-20-21 @ 07:00  --------------------------------------------------------  IN: 700 mL / OUT: 800 mL / NET: -100 mL    01-20-21 @ 07:01  -  01-20-21 @ 14:40  --------------------------------------------------------  IN: 220 mL / OUT: 150 mL / NET: 70 mL        PHYSICAL EXAM  Vital Signs Last 24 Hrs  T(C): 36.7 (20 Jan 2021 12:57), Max: 36.7 (20 Jan 2021 09:23)  T(F): 98 (20 Jan 2021 12:57), Max: 98 (20 Jan 2021 09:23)  HR: 72 (20 Jan 2021 14:08) (70 - 86)  BP: 98/61 (20 Jan 2021 14:08) (92/55 - 115/56)  BP(mean): --  RR: 16 (20 Jan 2021 14:08) (16 - 20)  SpO2: 100% (20 Jan 2021 14:08) (94% - 100%)      General: Well nourished in no acute distress. Alert and Oriented * 3.   Head: Normocephalic and atraumatic.   Neck: No JVD. No bruits. Supple. Does not appear to be enlarged.   Cardiovascular: + S1,S2 ; RRR Soft systolic murmur at the left lower sternal border. No rubs noted.    Lungs: CTA b/l. No rhonchi, rales or wheezes.   Abdomen: + BS, soft. Non tender. Non distended. No rebound. No guarding.   Extremities: No clubbing/cyanosis/edema.   Neurologic: Moves all four extremities. Full range of motion.   Skin: Warm and moist. The patient's skin has normal elasticity and good skin turgor.   Psychiatric: Appropriate mood and affect.  Musculoskeletal: Normal range of motion, normal strength    TELEMETRY: SR 70-80    A/P) 74 y/o female PMH HTN, HLD, DM, CAD s/p CABG 2014 and prior PCI, severe mitral regurgitation (s/p mitral clip 2019), pulmonary HTN, HF (EF 21 % June 2019), CKD IV not on dialysis (b/l SCr 2-2.2), hypothyroidism a/w acute on chronic systolic CHF with NYHA IV functional status.    -repeat echo noted but didn't reassess degree of mitral stenosis  -diuretics remain on hold  -continue Toprol XL, isordil/hydralazine as tolerated  -f/u with heart failure team recs  -very poor candidate for a primary prevention ICD  -will review RHC results  -further recs pending above  -f/u with Ashleigh on 1/22 at 1220pm    Amauri Hill PA-C  Pager: [phone]

## 2021-01-20 NOTE — DIETITIAN INITIAL EVALUATION ADULT. - PERSON TAUGHT/METHOD
verbal instruction/written material/patient instructed/daughter instructed/teach back - (Patient repeats in own words)

## 2021-01-20 NOTE — PROGRESS NOTE ADULT - SUBJECTIVE AND OBJECTIVE BOX
[facility]   MD Dion Dasilva MD, D.O. Ruoru Wong, PA    From 7 AM - 5 PM:  OFFICE: [phone]  Dr. Muhammad cell: [phone]  Dr. Montero cell: [phone]  Dr. Soria cell: [phone]  RASHIDA Smith cell: [phone]    From 5 PM - 7 AM: Answering Service: [phone]  Date of service: 01-20-21 @ 14:03    RENAL FOLLOW UP NOTE  --------------------------------------------------------------------------------  HPI:  Pt seen and examined at bedside.       PAST HISTORY  --------------------------------------------------------------------------------  No significant changes to PMH, PSH, FHx, SHx, unless otherwise noted    ALLERGIES & MEDICATIONS  --------------------------------------------------------------------------------  Allergies    azithromycin (Hives; Pruritus)    Intolerances      Standing Inpatient Medications  aspirin enteric coated 81 milliGRAM(s) Oral daily  atorvastatin 80 milliGRAM(s) Oral at bedtime  calamine/zinc oxide Lotion 1 Application(s) Topical three times a day  clobetasol 0.05% Cream 1 Application(s) Topical every 12 hours  clopidogrel Tablet 75 milliGRAM(s) Oral daily  dextrose 40% Gel 15 Gram(s) Oral once  dextrose 5%. 1000 milliLiter(s) IV Continuous <Continuous>  dextrose 5%. 1000 milliLiter(s) IV Continuous <Continuous>  dextrose 50% Injectable 25 Gram(s) IV Push once  dextrose 50% Injectable 12.5 Gram(s) IV Push once  dextrose 50% Injectable 25 Gram(s) IV Push once  glucagon  Injectable 1 milliGRAM(s) IntraMuscular once  heparin   Injectable 5000 Unit(s) SubCutaneous every 8 hours  hydrALAZINE 25 milliGRAM(s) Oral every 8 hours  insulin glargine Injectable (LANTUS) 65 Unit(s) SubCutaneous at bedtime  insulin lispro (ADMELOG) corrective regimen sliding scale   SubCutaneous three times a day before meals  insulin lispro (ADMELOG) corrective regimen sliding scale   SubCutaneous at bedtime  insulin lispro Injectable (ADMELOG) 26 Unit(s) SubCutaneous three times a day before meals  isosorbide   dinitrate Tablet (ISORDIL) 10 milliGRAM(s) Oral every 8 hours  levothyroxine Injectable 25 MICROGram(s) IV Push at bedtime  melatonin 3 milliGRAM(s) Oral at bedtime  metoprolol succinate  milliGRAM(s) Oral daily  senna 2 Tablet(s) Oral at bedtime    PRN Inpatient Medications      REVIEW OF SYSTEMS  --------------------------------------------------------------------------------  General: no fever  CVS: no chest pain  RESP: no sob, no cough  ABD: no abdominal pain  : no dysuria,  MSK: no edema     VITALS/PHYSICAL EXAM  --------------------------------------------------------------------------------  T(C): 36.7 (01-20-21 @ 12:57), Max: 36.7 (01-20-21 @ 09:23)  HR: 70 (01-20-21 @ 13:24) (70 - 86)  BP: 100/65 (01-20-21 @ 13:24) (92/55 - 115/56)  RR: 16 (01-20-21 @ 13:24) (16 - 20)  SpO2: 100% (01-20-21 @ 13:24) (94% - 100%)  Wt(kg): --        01-19-21 @ 07:01  -  01-20-21 @ 07:00  --------------------------------------------------------  IN: 700 mL / OUT: 800 mL / NET: -100 mL    01-20-21 @ 07:01  -  01-20-21 @ 14:03  --------------------------------------------------------  IN: 220 mL / OUT: 150 mL / NET: 70 mL      Physical Exam:  	Gen: NAD  	HEENT: MMM  	Pulm: CTA B/L  	CV: S1S2  	Abd: Soft, +BS  	Ext: No LE edema B/L                      Neuro: Awake   	Skin: Warm and Dry   	    LABS/STUDIES  --------------------------------------------------------------------------------              10.7   9.71  >-----------<  452      [01-20-21 @ 06:01]              34.7     134  |  96  |  117  ----------------------------<  241      [01-20-21 @ 06:00]  4.5   |  19  |  2.31        Ca     10.3     [01-20-21 @ 06:00]    TPro  8.3  /  Alb  3.9  /  TBili  0.4  /  DBili  x   /  AST  36  /  ALT  26  /  AlkPhos  103  [01-19-21 @ 06:01]    PT/INR: PT 12.5 , INR 1.05       [01-20-21 @ 07:40]  PTT: 43.5       [01-20-21 @ 07:40]      Creatinine Trend:  SCr 2.31 [01-20 @ 06:00]  SCr 2.47 [01-19 @ 06:01]  SCr 2.54 [01-18 @ 18:03]  SCr 2.39 [01-18 @ 06:34]  SCr 2.22 [01-17 @ 18:07]    Urinalysis - [01-12-21 @ 19:28]      Color Light Yellow / Appearance Clear / SG 1.011 / pH 6.0      Gluc Negative / Ketone Negative  / Bili Negative / Urobili Negative       Blood Negative / Protein 30 mg/dL / Leuk Est Moderate / Nitrite Negative      RBC 1 / WBC 5 / Hyaline 4 / Gran  / Sq Epi  / Non Sq Epi 1 / Bacteria Negative      Ferritin 111      [01-13-21 @ 01:42]  HbA1c 7.5      [10-08-19 @ 14:30]  TSH 2.15      [01-13-21 @ 10:07]

## 2021-01-20 NOTE — PROGRESS NOTE ADULT - SUBJECTIVE AND OBJECTIVE BOX
Subjective:  - s/p RHC this morning    Medications:  aspirin enteric coated 81 milliGRAM(s) Oral daily  atorvastatin 80 milliGRAM(s) Oral at bedtime  calamine/zinc oxide Lotion 1 Application(s) Topical three times a day  clobetasol 0.05% Cream 1 Application(s) Topical every 12 hours  clopidogrel Tablet 75 milliGRAM(s) Oral daily  dextrose 40% Gel 15 Gram(s) Oral once  dextrose 5%. 1000 milliLiter(s) IV Continuous <Continuous>  dextrose 5%. 1000 milliLiter(s) IV Continuous <Continuous>  dextrose 50% Injectable 25 Gram(s) IV Push once  dextrose 50% Injectable 12.5 Gram(s) IV Push once  dextrose 50% Injectable 25 Gram(s) IV Push once  glucagon  Injectable 1 milliGRAM(s) IntraMuscular once  heparin   Injectable 5000 Unit(s) SubCutaneous every 8 hours  hydrALAZINE 25 milliGRAM(s) Oral every 8 hours  insulin glargine Injectable (LANTUS) 65 Unit(s) SubCutaneous at bedtime  insulin lispro (ADMELOG) corrective regimen sliding scale   SubCutaneous three times a day before meals  insulin lispro (ADMELOG) corrective regimen sliding scale   SubCutaneous at bedtime  insulin lispro Injectable (ADMELOG) 26 Unit(s) SubCutaneous three times a day before meals  isosorbide   dinitrate Tablet (ISORDIL) 10 milliGRAM(s) Oral every 8 hours  levothyroxine Injectable 25 MICROGram(s) IV Push at bedtime  melatonin 3 milliGRAM(s) Oral at bedtime  metoprolol succinate  milliGRAM(s) Oral daily  senna 2 Tablet(s) Oral at bedtime      ICU Vital Signs Last 24 Hrs  T(C): 36.7, Max: 36.7 ( @ 09:23)  HR: 72 (70 - 86)  BP: 98/61 (92/55 - 115/56)  BP(mean): --  ABP: --  ABP(mean): --  RR: 16 (16 - 20)  SpO2: 100% (94% - 100%)    Weight in k.9 (21)  Weight in k.2 (21)      I&O's Summary Last 24 Hrs    IN: 700 mL / OUT: 800 mL / NET: -100 mL      Tele:    Physical Exam:    General: No distress. Comfortable.  HEENT: EOM intact.  Neck: Neck supple. JVP not elevated.  Chest: Clear to auscultation bilaterally  CV: Normal S1 and S2. No murmurs, rub, or gallops. Radial pulses normal.  Abdomen: Soft, non-distended, non-tender  Skin: No rashes or skin breakdown  Extremities: Warm, no edema  Neurology: Alert and oriented times three. Sensation intact  Psych: Affect normal    Labs:    ( 21 @ 06:01 )               10.7   9.71  )--------( 452                  34.7     ( 21 @ 06:00 )     134  |  96  |  117  ---------------------<  241  4.5  |  19  |  2.31    Ca 10.3  Phos x   Mg x     ( 21 @ 06:01 )  TPro  8.3  /  Alb  3.9  /  TBili  0.4  /  DBili  x   /  AST  36  /  ALT  26  /  AlkPhos  103  ( 21 @ 18:03 )  TPro  8.0  /  Alb  3.9  /  TBili  0.3  /  DBili  x   /  AST  38  /  ALT  24  /  AlkPhos  105    PTT/PT/INR - ( 21 @ 07:40 )  PTT: 43.5 sec / PT: 12.5 sec / INR: 1.05 ratio    ( 21 @ 05:14 )  TropHS 55    / CK x     / CKMB x      ( 21 @ 17:42 )  TropHS 56    / CK x     / CKMB x        Serum Pro-Brain Natriuretic Peptide: 5178 (21 @ 17:42)

## 2021-01-20 NOTE — CONSULT NOTE ADULT - PROBLEM SELECTOR RECOMMENDATION 2
Will continue home-dose synthroid, continue monitoring TFTs and FU.
-s/p [facility] 6/2019.

## 2021-01-20 NOTE — DIETITIAN INITIAL EVALUATION ADULT. - PERTINENT LABORATORY DATA
01-20 @ 06:00: Na 134<L>, <H>, Cr 2.31<H>, <H>, K+ 4.5  01-14: HbA1c 7.2%    CAPILLARY BLOOD GLUCOSE  POCT Blood Glucose.: 275 mg/dL (20 Jan 2021 12:50)  POCT Blood Glucose.: 268 mg/dL (20 Jan 2021 07:31)  POCT Blood Glucose.: 354 mg/dL (19 Jan 2021 21:04)  POCT Blood Glucose.: 326 mg/dL (19 Jan 2021 16:24)

## 2021-01-20 NOTE — DIETITIAN INITIAL EVALUATION ADULT. - ORAL INTAKE PTA/DIET HISTORY
Pt reports good appetite and po intake PTA, pt daughter prepares her meals for her. Pt endorses following a low sugar and low sodium diet at home- verified with pt daughter. Pt does not consume any take out foods. She limits fluid intake to 3-4 cups of fluid daily. NKFA reported. Pt follows Halal dietary laws.

## 2021-01-20 NOTE — CONSULT NOTE ADULT - PROBLEM SELECTOR RECOMMENDATION 9
Will increase Lantus to 65u at bedtime.  Will increase Admelog to 26u before each meal and continue Admelog correction scale coverage. Will continue monitoring FS and FU.  Patient counseled for compliance with consistent low carb diet and exercise as tolerated outpatient.
-Likely ICM, w/ CAD s/p CABG 2014 as well as prior PCI.  -Echo 1/2021: LVIDd 4.7, EF 20%, mild-moderate MR with mitraclip well seated, RVSP 49, mild pulmHTN with RV systolic dysfunction.  -RHC 10/2019: CVP 5-6, PA 46/14 (24), mixed 55%, CO 4.28. CI 2.9, SVR 1000  -Presenting with progressively worsening VOL; home medications: Bumex 2mg PO BID, Lopressor 12.5mg PO BID, midodrine 10mg PO TID.   -Continue Bumex 4mg IV BID with metolazone 2.5mg PO.  -Continue Lopressor 12.5mg PO BID, hold for HR <60.  -Add hydralazine 10mg PO TID, hold for MAP<65.   -Will hold ACEi/ARB/ARNI and MRA at this time given uptrend in sCr.  -She has refused AICD in the past for primary VT prevention.  -Strict ins and outs, standing daily weights, replete lytes PRN K=4, Phos=3, Mg=2, 2g Na restricted diet.  -Continue telemetry monitoring.

## 2021-01-20 NOTE — PROGRESS NOTE ADULT - ASSESSMENT
74 yo F w/ PMH of CKD stage 4 (baseline Cr 1.9-2.2) HTN, T2DM, CAD s/p CABG X3 (2014 at [facility]), non-dilated ICM (EF 20-25%), severe mitral regurgitation s/p mitral clip (6/13/19), severe pulm HTN, hypothyroidism who presented for evaluation of SOB and was admitted for ADHF.    CKD stage 4  - baseline Cr 1.9-2.2; has had recurrent MERVIN's over the years  - CKD is likely 2/2 recurrent MERVIN's + underlying DM/HTN  - Scr stable   - Diuretics held at present, Diuretics per heart failure team   - Avoid hypotension   - renal sonogram in the past with simple renal cyst  - Avoid nephrotoxins including NSAIDs, IV contrast (if possible), [employer] products  - monitor I/O's accurately    HTN  BP controlled  Low salt diet   MOnitor BP    Proteinuria/Hematuria  - likely from underlying DM  - has 1.8 grams proteinuria  - Hep B, Hep C, HIV RF, C3, C4, p-ANCA, c-ANCA, RPR neg  - weak gamma-migrating protein, weak IgG lambda protein band noted in the past. Pt to follow w/ heme   - DEAN 1:320 positive  - RPR neg, FTA +

## 2021-01-20 NOTE — PROGRESS NOTE ADULT - ATTENDING COMMENTS
75F w/ PMHx of severe ICM (EF 20%), severe MR s/p mitraclip, HTN, HLD, DM2, CKD b/l CR~2 p/w sob. Suspect acute on chronic chf exacerbation. PNA less likely, can dc abx. She is now s/p diuresis and off o2. Holding diuretic today for Cr bump. Will uptitrate metop to achieve goal HR.     #acute on chronic HFrEF  -ECG NSR, pvcs, RA deviation, diffuse TWIs and ST deppressions in precordial leads  -suspect trop is demand in setting of CHF excacerbation  -she is warm at this time - c/w metop increased dose, hydral/isordil per CHF. Hold for sbp < 90  -holding diuretics today, creatinine is downternding  -tele  -likely for RHC to determine vol status  -cards consult appreciated  -trend LFTs daily  -strict is/os and daily standing weights    #DM2  -titrate insulin prn    #rash  -diffuse erythematous, raised pruritic papules  ?severe eczema  -start steroid cream, hydroxycine prn, permetherin per derm  -improving    #decreased LLE pulses - vasc doppler negative    Tor Newberry MD  Division of Hospital Medicine  Pager: [phone]  Office: [phone]

## 2021-01-20 NOTE — DIETITIAN INITIAL EVALUATION ADULT. - CHIEF COMPLAINT
Per chart, pt is 75yoF with extensive PMHx significant for HTN, HLD, T2DM, CAD s/p CABG, ICM HFrEF, severe mitral regurgitation s/p mitral clip, severe pulmonary HTN, KCD IV, hypothyroidism, admitted with acute respiratory failure in setting of acute heart failure exacerbation. Pt s/p RHC today. Diuretics on hold secondary to worsened renal function.

## 2021-01-20 NOTE — DIETITIAN INITIAL EVALUATION ADULT. - OTHER INFO
Pt endorses SMBG x2 daily, she is unable to recall her usual BG range. Pt endorses her daughter assists her with SMBG and with dosing insulin. She takes humalog and lantus at home. HgbA1c 7.2% noted on current admission, indicative of adequate BG control for age/comorbidities.    Nutrition Supplements PTA: none reported    Pt UBW: ~110 lbs (pt unable to state, however pt daughter provides). Pt was being weighed daily at home per pt daughter and she is aware of wt gain parameters.  Pt dosing wt reflect pt UBW. Weights obtained on current admission as follows: 121 lbs (1/14), 115 lbs (1/19), 114.4 lbs (1/20).    Pt reports good appetite and po intake in-patient, although c/o feeling weak following RHC this morning. Pt noted with % po intake at meals per nursing flow sheet and observed with 100% po intake at lunch as tray is observed at bedside. Pt ordered for 3 diet mighty health shakes daily and she is drinking shakes.  Pt denies chewing/swallowing difficulties.  Pt has no c/o nausea, vomiting, diarrhea, or constipation.     CHF [facility] nutrition education attempted earlier on current admission per RD note, however pt only accepted written materials at that time.  Nutrition education reviewed with both pt and pt daughter (over the phone) today and pt daughter is well versed in nutrition recommendations and able to teach back all nutrition education points. Written materials already provided to pt earlier on.

## 2021-01-20 NOTE — PROGRESS NOTE ADULT - ASSESSMENT
75y.o F Belarusian speaking h/o HTN, HLD, DM, CAD s/p CABG 2014 and prior PCI, severe mitral regurgitation (s/p mitral clip 2019), pulmonary HTN (TTE 2019), HF (EF 21 % June 2019), CKD IV not on dialysis (b/l SCr 2-2.2), hypothyroidism BIBEMS 2/2 worsening work of breathing, found to have crackles, elevated proBNP and b/l pulmonary edema on CXR c/w acute exacerbation of CHF.      Problem/Plan - 1:  ·  Problem: Acute respiratory failure with hypoxia.  Plan: - 2/2 CHF exacerbation.  EF 20% w/ mitral stenosis   - s/p TID 4mg Bumex; stopped 1/18 b/c of increased Cr and euvolemic status   - c/w Lopressor; titrating dose to achieve HR 60-70   - CXR on admission showed bilateral predominantly lower lobe hazy opacities which is likely 2/2 fluid.  Not presenting w/ clinical signs of PNA and CT chest showed no focal consolidations.  Monitoring of abx.  Blood cx also negative.      Problem/Plan - 2:  ·  Problem: Chronic systolic heart failure.  Plan: -TTE 1/13 showed EF of 20% with global LV and RV dysfunction w/ MS  - Heart failure and cardiology following  - titrating Lopressor to achieve goal HR 60-70  - stopped bumex 1/18 for euvolemic status and increasing Cr  - per HF not dobutamine indicated at this time.      Problem/Plan - 3:  ·  Problem: Urticarial rash.  Plan: Raised urticarial rash over UE and back   -Derm consulted, recs as below:  -clobetasol 0.05% ointment BID   -permethrin 5% cream, apply to whole body from neck down at bedtime x 1 night and rinse off in morning (8-10 hours later). Repeat again in 1 week.      Problem/Plan - 4:  ·  Problem: H/O pulmonary hypertension.  Plan: Most likely due to LV failure   -repeat TTE shows mild pulm HTN w/ normal RV size but decreased function.      Problem/Plan - 5:  ·  Problem: Type 2 diabetes mellitus with diabetic nephropathy, with long-term current use of insulin.  Plan: -Takes 60 units of lantus at bedtime and 23 units of humalog   - increased Lantus and Admelog based on sliding scale.  Will continue to titrate to achieve BS goal 140-180.  Increased insulin dosages on 1/18, 1/19.  - consulted endocrinology for further recommendations - pt may need BID lantus.     Problem/Plan - 6:  Problem: Hypothyroidism, unspecified type. Plan: -c/w Levothyroxine 50 mcg daily.     Problem/Plan - 7:  ·  Problem: Hyperlipidemia, unspecified hyperlipidemia type.  Plan: - continue Atorvastatin 80 mg when patient no longer NPO.      Problem/Plan - 8:  ·  Problem: DVT prophylaxis.  Plan: Diet: DASH diet   DVT ppx: HSQ.

## 2021-01-21 LAB
ANION GAP SERPL CALC-SCNC: 18 MMOL/L — HIGH (ref 5–17)
BUN SERPL-MCNC: 125 MG/DL — HIGH (ref 7–23)
CALCIUM SERPL-MCNC: 10 MG/DL — SIGNIFICANT CHANGE UP (ref 8.4–10.5)
CHLORIDE SERPL-SCNC: 97 MMOL/L — SIGNIFICANT CHANGE UP (ref 96–108)
CO2 SERPL-SCNC: 19 MMOL/L — LOW (ref 22–31)
CREAT SERPL-MCNC: 2.1 MG/DL — HIGH (ref 0.5–1.3)
GLUCOSE BLDC GLUCOMTR-MCNC: 246 MG/DL — HIGH (ref 70–99)
GLUCOSE BLDC GLUCOMTR-MCNC: 268 MG/DL — HIGH (ref 70–99)
GLUCOSE BLDC GLUCOMTR-MCNC: 373 MG/DL — HIGH (ref 70–99)
GLUCOSE BLDC GLUCOMTR-MCNC: 416 MG/DL — HIGH (ref 70–99)
GLUCOSE BLDC GLUCOMTR-MCNC: 424 MG/DL — HIGH (ref 70–99)
GLUCOSE SERPL-MCNC: 404 MG/DL — HIGH (ref 70–99)
HCT VFR BLD CALC: 33 % — LOW (ref 34.5–45)
HGB BLD-MCNC: 10.1 G/DL — LOW (ref 11.5–15.5)
MAGNESIUM SERPL-MCNC: 2.9 MG/DL — HIGH (ref 1.6–2.6)
MCHC RBC-ENTMCNC: 26.2 PG — LOW (ref 27–34)
MCHC RBC-ENTMCNC: 30.6 GM/DL — LOW (ref 32–36)
MCV RBC AUTO: 85.7 FL — SIGNIFICANT CHANGE UP (ref 80–100)
NRBC # BLD: 0 /100 WBCS — SIGNIFICANT CHANGE UP (ref 0–0)
PHOSPHATE SERPL-MCNC: 4.8 MG/DL — HIGH (ref 2.5–4.5)
PLATELET # BLD AUTO: 440 K/UL — HIGH (ref 150–400)
POTASSIUM SERPL-MCNC: 4.4 MMOL/L — SIGNIFICANT CHANGE UP (ref 3.5–5.3)
POTASSIUM SERPL-SCNC: 4.4 MMOL/L — SIGNIFICANT CHANGE UP (ref 3.5–5.3)
RBC # BLD: 3.85 M/UL — SIGNIFICANT CHANGE UP (ref 3.8–5.2)
RBC # FLD: 16.7 % — HIGH (ref 10.3–14.5)
SODIUM SERPL-SCNC: 134 MMOL/L — LOW (ref 135–145)
WBC # BLD: 10.02 K/UL — SIGNIFICANT CHANGE UP (ref 3.8–10.5)
WBC # FLD AUTO: 10.02 K/UL — SIGNIFICANT CHANGE UP (ref 3.8–10.5)

## 2021-01-21 PROCEDURE — 99233 SBSQ HOSP IP/OBS HIGH 50: CPT

## 2021-01-21 PROCEDURE — 93010 ELECTROCARDIOGRAM REPORT: CPT

## 2021-01-21 PROCEDURE — 71045 X-RAY EXAM CHEST 1 VIEW: CPT | Mod: 26

## 2021-01-21 PROCEDURE — 99232 SBSQ HOSP IP/OBS MODERATE 35: CPT | Mod: GC

## 2021-01-21 RX ORDER — POLYETHYLENE GLYCOL 3350 17 G/17G
17 POWDER, FOR SOLUTION ORAL
Refills: 0 | Status: DISCONTINUED | OUTPATIENT
Start: 2021-01-21 | End: 2021-02-02

## 2021-01-21 RX ORDER — INSULIN GLARGINE 100 [IU]/ML
30 INJECTION, SOLUTION SUBCUTANEOUS EVERY MORNING
Refills: 0 | Status: DISCONTINUED | OUTPATIENT
Start: 2021-01-21 | End: 2021-01-22

## 2021-01-21 RX ORDER — INSULIN LISPRO 100/ML
30 VIAL (ML) SUBCUTANEOUS
Refills: 0 | Status: DISCONTINUED | OUTPATIENT
Start: 2021-01-21 | End: 2021-01-21

## 2021-01-21 RX ORDER — LACTULOSE 10 G/15ML
20 SOLUTION ORAL ONCE
Refills: 0 | Status: COMPLETED | OUTPATIENT
Start: 2021-01-21 | End: 2021-01-21

## 2021-01-21 RX ORDER — MILRINONE LACTATE 1 MG/ML
0.25 INJECTION, SOLUTION INTRAVENOUS
Qty: 20 | Refills: 0 | Status: DISCONTINUED | OUTPATIENT
Start: 2021-01-21 | End: 2021-01-25

## 2021-01-21 RX ORDER — INSULIN LISPRO 100/ML
35 VIAL (ML) SUBCUTANEOUS
Refills: 0 | Status: DISCONTINUED | OUTPATIENT
Start: 2021-01-21 | End: 2021-01-22

## 2021-01-21 RX ADMIN — Medication 4: at 16:26

## 2021-01-21 RX ADMIN — MILRINONE LACTATE 3.74 MICROGRAM(S)/KG/MIN: 1 INJECTION, SOLUTION INTRAVENOUS at 16:00

## 2021-01-21 RX ADMIN — Medication 25 MICROGRAM(S): at 23:42

## 2021-01-21 RX ADMIN — HEPARIN SODIUM 5000 UNIT(S): 5000 INJECTION INTRAVENOUS; SUBCUTANEOUS at 04:39

## 2021-01-21 RX ADMIN — CALAMINE AND ZINC OXIDE AND PHENOL 1 APPLICATION(S): 160; 10 LOTION TOPICAL at 21:49

## 2021-01-21 RX ADMIN — Medication 100 MILLIGRAM(S): at 04:40

## 2021-01-21 RX ADMIN — MILRINONE LACTATE 2.99 MICROGRAM(S)/KG/MIN: 1 INJECTION, SOLUTION INTRAVENOUS at 15:04

## 2021-01-21 RX ADMIN — HEPARIN SODIUM 5000 UNIT(S): 5000 INJECTION INTRAVENOUS; SUBCUTANEOUS at 21:48

## 2021-01-21 RX ADMIN — CLOPIDOGREL BISULFATE 75 MILLIGRAM(S): 75 TABLET, FILM COATED ORAL at 11:37

## 2021-01-21 RX ADMIN — POLYETHYLENE GLYCOL 3350 17 GRAM(S): 17 POWDER, FOR SOLUTION ORAL at 12:19

## 2021-01-21 RX ADMIN — Medication 30 UNIT(S): at 16:26

## 2021-01-21 RX ADMIN — Medication 3 MILLIGRAM(S): at 21:49

## 2021-01-21 RX ADMIN — CALAMINE AND ZINC OXIDE AND PHENOL 1 APPLICATION(S): 160; 10 LOTION TOPICAL at 04:40

## 2021-01-21 RX ADMIN — Medication 30 UNIT(S): at 11:35

## 2021-01-21 RX ADMIN — Medication 25 MILLIGRAM(S): at 04:40

## 2021-01-21 RX ADMIN — Medication 12: at 11:36

## 2021-01-21 RX ADMIN — SENNA PLUS 2 TABLET(S): 8.6 TABLET ORAL at 21:49

## 2021-01-21 RX ADMIN — POLYETHYLENE GLYCOL 3350 17 GRAM(S): 17 POWDER, FOR SOLUTION ORAL at 02:37

## 2021-01-21 RX ADMIN — Medication 25 MILLIGRAM(S): at 15:03

## 2021-01-21 RX ADMIN — INSULIN GLARGINE 65 UNIT(S): 100 INJECTION, SOLUTION SUBCUTANEOUS at 21:48

## 2021-01-21 RX ADMIN — Medication 81 MILLIGRAM(S): at 11:37

## 2021-01-21 RX ADMIN — CALAMINE AND ZINC OXIDE AND PHENOL 1 APPLICATION(S): 160; 10 LOTION TOPICAL at 15:03

## 2021-01-21 RX ADMIN — Medication 2: at 21:49

## 2021-01-21 RX ADMIN — INSULIN GLARGINE 30 UNIT(S): 100 INJECTION, SOLUTION SUBCUTANEOUS at 11:36

## 2021-01-21 RX ADMIN — Medication 1 APPLICATION(S): at 17:50

## 2021-01-21 RX ADMIN — HEPARIN SODIUM 5000 UNIT(S): 5000 INJECTION INTRAVENOUS; SUBCUTANEOUS at 15:03

## 2021-01-21 RX ADMIN — BUMETANIDE 2 MILLIGRAM(S): 0.25 INJECTION INTRAMUSCULAR; INTRAVENOUS at 17:50

## 2021-01-21 RX ADMIN — Medication 25 MILLIGRAM(S): at 21:50

## 2021-01-21 RX ADMIN — Medication 10: at 08:30

## 2021-01-21 RX ADMIN — ATORVASTATIN CALCIUM 80 MILLIGRAM(S): 80 TABLET, FILM COATED ORAL at 21:49

## 2021-01-21 RX ADMIN — Medication 26 UNIT(S): at 08:29

## 2021-01-21 RX ADMIN — Medication 1 APPLICATION(S): at 04:40

## 2021-01-21 RX ADMIN — POLYETHYLENE GLYCOL 3350 17 GRAM(S): 17 POWDER, FOR SOLUTION ORAL at 22:54

## 2021-01-21 RX ADMIN — BUMETANIDE 2 MILLIGRAM(S): 0.25 INJECTION INTRAMUSCULAR; INTRAVENOUS at 04:39

## 2021-01-21 RX ADMIN — MILRINONE LACTATE 3.74 MICROGRAM(S)/KG/MIN: 1 INJECTION, SOLUTION INTRAVENOUS at 21:50

## 2021-01-21 RX ADMIN — LACTULOSE 20 GRAM(S): 10 SOLUTION ORAL at 23:52

## 2021-01-21 NOTE — PROGRESS NOTE ADULT - SUBJECTIVE AND OBJECTIVE BOX
Jacquie Amos MD  Internal Medicine, PGY-2   [phone]    Patient is a 75y old  Female who presents with a chief complaint of increased work of breathing (20 Jan 2021 15:32)      SUBJECTIVE / OVERNIGHT EVENTS:      REVIEW OF SYSTEMS:    CONSTITUTIONAL: No weakness, fevers or chills  EYES: No visual changes; No blurry vision  ENT:  No pain or stiffness; No vertigo or throat pain  RESPIRATORY: No cough, wheezing, hemoptysis; No shortness of breath  CARDIOVASCULAR: No chest pain or palpitations  GASTROINTESTINAL: No abdominal or epigastric pain. No nausea, vomiting, or hematemesis; No diarrhea or constipation. No melena or hematochezia.  GENITOURINARY: No dysuria, frequency or hematuria  NEUROLOGICAL: No numbness, No HA  SKIN: No itching, rashes  MSK: no joint pain, no muscle pain  MEDICATIONS  (STANDING):  aspirin enteric coated 81 milliGRAM(s) Oral daily  atorvastatin 80 milliGRAM(s) Oral at bedtime  buMETAnide Injectable 2 milliGRAM(s) IV Push two times a day  calamine/zinc oxide Lotion 1 Application(s) Topical three times a day  clobetasol 0.05% Cream 1 Application(s) Topical every 12 hours  clopidogrel Tablet 75 milliGRAM(s) Oral daily  dextrose 40% Gel 15 Gram(s) Oral once  dextrose 5%. 1000 milliLiter(s) (50 mL/Hr) IV Continuous <Continuous>  dextrose 5%. 1000 milliLiter(s) (100 mL/Hr) IV Continuous <Continuous>  dextrose 50% Injectable 25 Gram(s) IV Push once  dextrose 50% Injectable 12.5 Gram(s) IV Push once  dextrose 50% Injectable 25 Gram(s) IV Push once  glucagon  Injectable 1 milliGRAM(s) IntraMuscular once  heparin   Injectable 5000 Unit(s) SubCutaneous every 8 hours  hydrALAZINE 25 milliGRAM(s) Oral every 8 hours  insulin glargine Injectable (LANTUS) 65 Unit(s) SubCutaneous at bedtime  insulin lispro (ADMELOG) corrective regimen sliding scale   SubCutaneous three times a day before meals  insulin lispro (ADMELOG) corrective regimen sliding scale   SubCutaneous at bedtime  insulin lispro Injectable (ADMELOG) 26 Unit(s) SubCutaneous three times a day before meals  levothyroxine Injectable 25 MICROGram(s) IV Push at bedtime  melatonin 3 milliGRAM(s) Oral at bedtime  metoprolol succinate  milliGRAM(s) Oral daily  milrinone Infusion 0.2 MICROgram(s)/kG/Min (2.99 mL/Hr) IV Continuous <Continuous>  senna 2 Tablet(s) Oral at bedtime    MEDICATIONS  (PRN):  polyethylene glycol 3350 17 Gram(s) Oral two times a day PRN Constipation      CAPILLARY BLOOD GLUCOSE      POCT Blood Glucose.: 373 mg/dL (21 Jan 2021 07:36)  POCT Blood Glucose.: 436 mg/dL (20 Jan 2021 21:02)  POCT Blood Glucose.: 415 mg/dL (20 Jan 2021 16:17)  POCT Blood Glucose.: 275 mg/dL (20 Jan 2021 12:50)    I&O's Summary    20 Jan 2021 07:01  -  21 Jan 2021 07:00  --------------------------------------------------------  IN: 372.9 mL / OUT: 1250 mL / NET: -877.1 mL        PHYSICAL EXAM:  Vital Signs Last 24 Hrs  T(C): 37.1 (21 Jan 2021 04:00), Max: 37.1 (20 Jan 2021 20:05)  T(F): 98.8 (21 Jan 2021 04:00), Max: 98.8 (21 Jan 2021 04:00)  HR: 82 (21 Jan 2021 04:40) (70 - 87)  BP: 113/64 (21 Jan 2021 04:40) (92/55 - 115/56)  BP(mean): --  RR: 18 (21 Jan 2021 04:00) (16 - 18)  SpO2: 98% (21 Jan 2021 04:00) (93% - 100%)    PHYSICAL EXAM:  GENERAL: NAD, well-groomed, well-developed  HEAD:  Atraumatic, Normocephalic  EYES: EOMI, PERRLA, conjunctiva and sclera clear  ENMT: No tonsillar erythema, exudates, or enlargement; Moist mucous membranes, Good dentition, No lesions  NECK: Supple, No JVD, Normal thyroid  CHEST/LUNG: Clear to ausculation bilaterally; No rales, rhonchi, wheezing, or rubs  HEART: Regular rate and rhythm; No murmurs, rubs, or gallops  ABDOMEN: Soft, Nontender, Nondistended; Bowel sounds present  EXTREMITIES:  2+ Peripheral Pulses, No clubbing, cyanosis, or edema  LYMPH: No lymphadenopathy noted  SKIN: No rashes or lesions  NERVOUS SYSTEM:  Alert & Oriented X3, Good concentration; Motor Strength 5/5 B/L upper and lower extremities; DTRs 2+ intact and symmetric      LABS:                        10.7   9.71  )-----------( 452      ( 20 Jan 2021 06:01 )             34.7     01-21    134<L>  |  97  |  125<H>  ----------------------------<  404<H>  4.4   |  19<L>  |  2.10<H>    Ca    10.0      21 Jan 2021 05:59  Phos  4.8     01-21  Mg     2.9     01-21      PT/INR - ( 20 Jan 2021 07:40 )   PT: 12.5 sec;   INR: 1.05 ratio         PTT - ( 20 Jan 2021 07:40 )  PTT:43.5 sec          Procalcitonin, Serum: 0.14 (01-12)    C-Reactive Protein, Serum: 0.63 (01-12)    Ferritin, Serum: 111 (01-13)      D-Dimer Assay, Quantitative: 433 (01-12)        RADIOLOGY & ADDITIONAL TESTS:  Results Reviewed:   Imaging Personally Reviewed:  Electrocardiogram Personally Reviewed:    COORDINATION OF CARE:  Care Discussed with Consultants/Other Providers [Y/N]:  Prior or Outpatient Records Reviewed [Y/N]:   Jacquie Amos MD  Internal Medicine, PGY-3  [phone]    Patient is a 75y old  Female who presents with a chief complaint of increased work of breathing (20 Jan 2021 15:32)      SUBJECTIVE / OVERNIGHT EVENTS:  Pt appeared SOB on arrival however SpO2 96-98% on RA, CXR showed pulmonary congestion.  No events on tele       REVIEW OF SYSTEMS:    CONSTITUTIONAL: No fevers or chills  RESPIRATORY: No cough, wheezing, hemoptysis  CARDIOVASCULAR:  + stable chest pressure   GASTROINTESTINAL: No abdominal or epigastric pain. No nausea, vomiting, or hematemesis; No diarrhea or constipation. No melena or hematochezia.  GENITOURINARY: No dysuria, frequency or hematuria  SKIN: No itching, rashes  MSK: no joint pain, no muscle pain    MEDICATIONS  (STANDING):  aspirin enteric coated 81 milliGRAM(s) Oral daily  atorvastatin 80 milliGRAM(s) Oral at bedtime  buMETAnide Injectable 2 milliGRAM(s) IV Push two times a day  calamine/zinc oxide Lotion 1 Application(s) Topical three times a day  clobetasol 0.05% Cream 1 Application(s) Topical every 12 hours  clopidogrel Tablet 75 milliGRAM(s) Oral daily  dextrose 40% Gel 15 Gram(s) Oral once  dextrose 5%. 1000 milliLiter(s) (50 mL/Hr) IV Continuous <Continuous>  dextrose 5%. 1000 milliLiter(s) (100 mL/Hr) IV Continuous <Continuous>  dextrose 50% Injectable 25 Gram(s) IV Push once  dextrose 50% Injectable 12.5 Gram(s) IV Push once  dextrose 50% Injectable 25 Gram(s) IV Push once  glucagon  Injectable 1 milliGRAM(s) IntraMuscular once  heparin   Injectable 5000 Unit(s) SubCutaneous every 8 hours  hydrALAZINE 25 milliGRAM(s) Oral every 8 hours  insulin glargine Injectable (LANTUS) 65 Unit(s) SubCutaneous at bedtime  insulin lispro (ADMELOG) corrective regimen sliding scale   SubCutaneous three times a day before meals  insulin lispro (ADMELOG) corrective regimen sliding scale   SubCutaneous at bedtime  insulin lispro Injectable (ADMELOG) 26 Unit(s) SubCutaneous three times a day before meals  levothyroxine Injectable 25 MICROGram(s) IV Push at bedtime  melatonin 3 milliGRAM(s) Oral at bedtime  metoprolol succinate  milliGRAM(s) Oral daily  milrinone Infusion 0.2 MICROgram(s)/kG/Min (2.99 mL/Hr) IV Continuous <Continuous>  senna 2 Tablet(s) Oral at bedtime    MEDICATIONS  (PRN):  polyethylene glycol 3350 17 Gram(s) Oral two times a day PRN Constipation      CAPILLARY BLOOD GLUCOSE      POCT Blood Glucose.: 373 mg/dL (21 Jan 2021 07:36)  POCT Blood Glucose.: 436 mg/dL (20 Jan 2021 21:02)  POCT Blood Glucose.: 415 mg/dL (20 Jan 2021 16:17)  POCT Blood Glucose.: 275 mg/dL (20 Jan 2021 12:50)    I&O's Summary    20 Jan 2021 07:01  -  21 Jan 2021 07:00  --------------------------------------------------------  IN: 372.9 mL / OUT: 1250 mL / NET: -877.1 mL        PHYSICAL EXAM:  Vital Signs Last 24 Hrs  T(C): 37.1 (21 Jan 2021 04:00), Max: 37.1 (20 Jan 2021 20:05)  T(F): 98.8 (21 Jan 2021 04:00), Max: 98.8 (21 Jan 2021 04:00)  HR: 82 (21 Jan 2021 04:40) (70 - 87)  BP: 113/64 (21 Jan 2021 04:40) (92/55 - 115/56)  BP(mean): --  RR: 18 (21 Jan 2021 04:00) (16 - 18)  SpO2: 98% (21 Jan 2021 04:00) (93% - 100%)    PHYSICAL EXAM:  GENERAL: NAD, cachectic   HEAD:  Atraumatic, Normocephalic  EYES: EOMI, PERRLA, conjunctiva and sclera clear  CHEST/LUNG: increased bibasilar crackles, no wheeze  HEART: Regular rate and rhythm; No murmur  ABDOMEN: Soft, Nontender, Nondistended; Bowel sounds present  EXTREMITIES: no edema  NERVOUS SYSTEM:  Alert & Oriented X3, Good concentration; no focal deficits.        LABS:                        10.7   9.71  )-----------( 452      ( 20 Jan 2021 06:01 )             34.7     01-21    134<L>  |  97  |  125<H>  ----------------------------<  404<H>  4.4   |  19<L>  |  2.10<H>    Ca    10.0      21 Jan 2021 05:59  Phos  4.8     01-21  Mg     2.9     01-21      PT/INR - ( 20 Jan 2021 07:40 )   PT: 12.5 sec;   INR: 1.05 ratio         PTT - ( 20 Jan 2021 07:40 )  PTT:43.5 sec          Procalcitonin, Serum: 0.14 (01-12)    C-Reactive Protein, Serum: 0.63 (01-12)    Ferritin, Serum: 111 (01-13)      D-Dimer Assay, Quantitative: 433 (01-12)

## 2021-01-21 NOTE — PROGRESS NOTE ADULT - ATTENDING COMMENTS
Patient seen and examined, agree with above assessment and plan as transcribed above.    - Mean MV gradient 8-9 mmHg, wedge of 19, so LVEDP is approximately 10 mmHg which is likely optimal.  - Would favor D/C IV bumex  - cardiac Index 2.4 d/C milrinone as it may increase HR and subsequently increase MV gradient  - will d/w CHF team    Vargas Adame MD, [facility]  BEEPER [phone]

## 2021-01-21 NOTE — PROGRESS NOTE ADULT - ASSESSMENT
Assessment  DMT2: 75y Female with DM T2 with hyperglycemia, A1C 7.1%, was on insulin at home, now on basal bolus insulin, increased dose yesterday, Lantus adjusted to be BID by primary team, blood sugars running high and not at target, no hypoglycemic episodes, eating meals, no acute events.  HF: on medications, stable, monitored, FU Cardiology.  Hypothyroidism: On Synthroid 50 mcg po daily, compliant with Synthroid intake, asymptomatic, euthyroid.  CKD: Monitor labs/BMP      Jillian Banks MD  Cell: [phone]  Office: [phone]

## 2021-01-21 NOTE — PROGRESS NOTE ADULT - ASSESSMENT
Briefly, 76 yo F with HTN, HLD, DM2 (A1c 7.5 10/19), CAD s/p CABG (LIMA to LAD, SVG to OM, SVG to PDA) and prior PCI, ICM HFrEF (EF 20-25%, LVEDD 4.7 cm), severe mitral regurgitation s/p mitral clip (6/19; mean MV gradient 7-8), severe pulmonary HTN, CKD IV (b/l Cr 1.9-2.2), hypothyroidism who BIBEMS 2/2 worsening work of breathing presents in acute on chronic systolic CHF with NYHA IV functional status. Was notably on midodrine as outpatient since discharge from [facility] in 10/19. Has been having increased work of breathing at home with failure to thrive. Initially required Bipap and was given bumex with some improvement. Continues to be dyspneic. Etiology of her symptoms may be secondary to degree of mitral stenosis given HR in 80-90s with mean gradient of 8 on TTE. Also, per nursing staff some of her dyspnea may be anxiety related. She is s/p RHC yesterday which showed mildly elevated filling pressures and preserved CO. She was started on milrinone yesterday to assist with diuresis given her fluctuating renal function and today her weight is unchanged since yesterday and her SCr is downtrending, although her BUN is increasing.    1/20/20 RHC: RA 15, RV 49/10, PA 50/21 32, PCWP 19, PA 55.4, Ao 98. [place] 3.5/2.5

## 2021-01-21 NOTE — PROGRESS NOTE ADULT - ASSESSMENT
75y.o F Lao speaking h/o HTN, HLD, DM, CAD s/p CABG 2014 and prior PCI, severe mitral regurgitation (s/p mitral clip 2019), pulmonary HTN (TTE 2019), HF (EF 21 % June 2019), CKD IV not on dialysis (b/l SCr 2-2.2), hypothyroidism BIBEMS 2/2 worsening work of breathing, found to have crackles, elevated proBNP and b/l pulmonary edema on CXR c/w acute exacerbation of CHF.

## 2021-01-21 NOTE — PROGRESS NOTE ADULT - ATTENDING COMMENTS
75F w/ PMHx of severe ICM (EF 20%), severe MR s/p mitraclip, HTN, HLD, DM2, CKD b/l CR~2 p/w sob due to acute on chronic chf. Now tolerating RA, but based on RHC started on milrinone assisted diuresis.     #acute on chronic HFrEF  -ECG NSR, pvcs, RA deviation, diffuse TWIs and ST deppressions in precordial leads  -suspect trop is demand in setting of CHF excacerbation  -she is warm at this time - c/w metop increased dose, hydral/isordil per CHF. Hold for sbp < 90  -bumex 2 bid  -milrinone per CHF  -tele  -cards consult appreciated  -trend LFTs daily  -strict is/os and daily standing weights    #DM2  -titrate insulin prn    #rash  -diffuse erythematous, raised pruritic papules  ?severe eczema  -start steroid cream, hydroxycine prn, permetherin per derm  -improving    #decreased LLE pulses - vasc doppler negative    Tor Newberry MD  Division of Hospital Medicine  Pager: [phone]  Office: [phone]

## 2021-01-21 NOTE — PROGRESS NOTE ADULT - PROBLEM SELECTOR PLAN 2
s/p clip; has component of mitral stenosis (mean gradient 8-9); will be impt to keep HR controlled with BB as above

## 2021-01-21 NOTE — PROGRESS NOTE ADULT - SUBJECTIVE AND OBJECTIVE BOX
Subjective:      Medications:  aspirin enteric coated 81 milliGRAM(s) Oral daily  atorvastatin 80 milliGRAM(s) Oral at bedtime  buMETAnide Injectable 2 milliGRAM(s) IV Push two times a day  calamine/zinc oxide Lotion 1 Application(s) Topical three times a day  clobetasol 0.05% Cream 1 Application(s) Topical every 12 hours  clopidogrel Tablet 75 milliGRAM(s) Oral daily  dextrose 40% Gel 15 Gram(s) Oral once  dextrose 5%. 1000 milliLiter(s) IV Continuous <Continuous>  dextrose 5%. 1000 milliLiter(s) IV Continuous <Continuous>  dextrose 50% Injectable 25 Gram(s) IV Push once  dextrose 50% Injectable 12.5 Gram(s) IV Push once  dextrose 50% Injectable 25 Gram(s) IV Push once  glucagon  Injectable 1 milliGRAM(s) IntraMuscular once  heparin   Injectable 5000 Unit(s) SubCutaneous every 8 hours  hydrALAZINE 25 milliGRAM(s) Oral every 8 hours  insulin glargine Injectable (LANTUS) 65 Unit(s) SubCutaneous at bedtime  insulin glargine Injectable (LANTUS) 30 Unit(s) SubCutaneous every morning  insulin lispro (ADMELOG) corrective regimen sliding scale   SubCutaneous three times a day before meals  insulin lispro (ADMELOG) corrective regimen sliding scale   SubCutaneous at bedtime  insulin lispro Injectable (ADMELOG) 30 Unit(s) SubCutaneous three times a day before meals  levothyroxine Injectable 25 MICROGram(s) IV Push at bedtime  melatonin 3 milliGRAM(s) Oral at bedtime  metoprolol succinate  milliGRAM(s) Oral daily  milrinone Infusion 0.2 MICROgram(s)/kG/Min IV Continuous <Continuous>  polyethylene glycol 3350 17 Gram(s) Oral two times a day PRN  senna 2 Tablet(s) Oral at bedtime      ICU Vital Signs Last 24 Hrs  T(C): 36.4, Max: 37.1 ( @ 20:05)  HR: 78 (70 - 87)  BP: 114/74 (98/61 - 114/74)  BP(mean): --  ABP: --  ABP(mean): --  RR: 18 (16 - 18)  SpO2: 100% (93% - 100%)    Weight in k.8 (21)  Weight in k.9 (21)      I&O's Summary Last 24 Hrs    IN: 372.9 mL / OUT: 1250 mL / NET: -877.1 mL      Tele:    Physical Exam:    General: No distress. Comfortable.  HEENT: EOM intact.  Neck: Neck supple. JVP not elevated.  Chest: Clear to auscultation bilaterally  CV: Normal S1 and S2. No murmurs, rub, or gallops. Radial pulses normal.  Abdomen: Soft, non-distended, non-tender  Skin: No rashes or skin breakdown  Extremities: Warm, no edema  Neurology: Alert and oriented times three. Sensation intact  Psych: Affect normal    Labs:    ( 21 @ 05:59 )               10.1   10.02 )--------( 440                  33.0     ( 21 @ 05:59 )     134  |  97  |  125  ---------------------<  404  4.4  |  19  |  2.10    Ca 10.0  Phos 4.8  Mg 2.9    ( 21 @ 06:01 )  TPro  8.3  /  Alb  3.9  /  TBili  0.4  /  DBili  x   /  AST  36  /  ALT  26  /  AlkPhos  103  ( 21 @ 18:03 )  TPro  8.0  /  Alb  3.9  /  TBili  0.3  /  DBili  x   /  AST  38  /  ALT  24  /  AlkPhos  105    PTT/PT/INR - ( 21 @ 07:40 )  PTT: 43.5 sec / PT: 12.5 sec / INR: 1.05 ratio    ( 21 @ 05:14 )  TropHS 55    / CK x     / CKMB x      ( 21 @ 17:42 )  TropHS 56    / CK x     / CKMB x        Serum Pro-Brain Natriuretic Peptide: 5178 (21 @ 17:42)             Subjective:  - s/p RHC yesterday  - Denies SOB at rest, lightheadedness, and dizziness    Medications:  aspirin enteric coated 81 milliGRAM(s) Oral daily  atorvastatin 80 milliGRAM(s) Oral at bedtime  buMETAnide Injectable 2 milliGRAM(s) IV Push two times a day  calamine/zinc oxide Lotion 1 Application(s) Topical three times a day  clobetasol 0.05% Cream 1 Application(s) Topical every 12 hours  clopidogrel Tablet 75 milliGRAM(s) Oral daily  dextrose 40% Gel 15 Gram(s) Oral once  dextrose 5%. 1000 milliLiter(s) IV Continuous <Continuous>  dextrose 5%. 1000 milliLiter(s) IV Continuous <Continuous>  dextrose 50% Injectable 25 Gram(s) IV Push once  dextrose 50% Injectable 12.5 Gram(s) IV Push once  dextrose 50% Injectable 25 Gram(s) IV Push once  glucagon  Injectable 1 milliGRAM(s) IntraMuscular once  heparin   Injectable 5000 Unit(s) SubCutaneous every 8 hours  hydrALAZINE 25 milliGRAM(s) Oral every 8 hours  insulin glargine Injectable (LANTUS) 65 Unit(s) SubCutaneous at bedtime  insulin glargine Injectable (LANTUS) 30 Unit(s) SubCutaneous every morning  insulin lispro (ADMELOG) corrective regimen sliding scale   SubCutaneous three times a day before meals  insulin lispro (ADMELOG) corrective regimen sliding scale   SubCutaneous at bedtime  insulin lispro Injectable (ADMELOG) 30 Unit(s) SubCutaneous three times a day before meals  levothyroxine Injectable 25 MICROGram(s) IV Push at bedtime  melatonin 3 milliGRAM(s) Oral at bedtime  metoprolol succinate  milliGRAM(s) Oral daily  milrinone Infusion 0.2 MICROgram(s)/kG/Min IV Continuous <Continuous>  polyethylene glycol 3350 17 Gram(s) Oral two times a day PRN  senna 2 Tablet(s) Oral at bedtime      ICU Vital Signs Last 24 Hrs  T(C): 36.4, Max: 37.1 (-20 @ 20:05)  HR: 78 (70 - 87)  BP: 114/74 (98/61 - 114/74)  BP(mean): --  ABP: --  ABP(mean): --  RR: 18 (16 - 18)  SpO2: 100% (93% - 100%)    Weight in k.8 (21)  Weight in k.9 (21)      I&O's Summary Last 24 Hrs    IN: 372.9 mL / OUT: 1250 mL / NET: -877.1 mL      Tele: SR/1st degree AVB 70-80s, PVCs, triplets    Physical Exam:    General: No distress. Comfortable.  HEENT: EOM intact.  Neck: Neck supple. JVP mildly elevated.  Chest: Clear to auscultation bilaterally  CV: RRR. Normal S1 and S2. No murmurs, rub, or gallops. Radial pulses normal.  Abdomen: Soft, non-distended, non-tender  Skin: No rashes or skin breakdown  Extremities: Warm, no edema  Neurology: Alert and oriented times three. Sensation intact  Psych: Affect normal    Labs:    ( 21 @ 05:59 )               10.1   10.02 )--------( 440                  33.0     ( 21 @ 05:59 )     134  |  97  |  125  ---------------------<  404  4.4  |  19  |  2.10    Ca 10.0  Phos 4.8  Mg 2.9    ( 21 @ 06:01 )  TPro  8.3  /  Alb  3.9  /  TBili  0.4  /  DBili  x   /  AST  36  /  ALT  26  /  AlkPhos  103  ( 21 @ 18:03 )  TPro  8.0  /  Alb  3.9  /  TBili  0.3  /  DBili  x   /  AST  38  /  ALT  24  /  AlkPhos  105    PTT/PT/INR - ( 21 @ 07:40 )  PTT: 43.5 sec / PT: 12.5 sec / INR: 1.05 ratio    ( 21 @ 05:14 )  TropHS 55    / CK x     / CKMB x      ( 21 @ 17:42 )  TropHS 56    / CK x     / CKMB x        Serum Pro-Brain Natriuretic Peptide: 5178 (21 @ 17:42)

## 2021-01-21 NOTE — PROGRESS NOTE ADULT - SUBJECTIVE AND OBJECTIVE BOX
S: Resting comfortably, SOB improving. Denies chest pain. Review of systems otherwise (-)    Review of Systems:   Constitutional: [ ] fevers, [ ] chills.   Skin: [ ] dry skin. [ ] rashes.  Psychiatric: [ ] depression, [ ] anxiety.   Gastrointestinal: [ ] BRBPR, [ ] melena.   Neurological: [ ] confusion. [ ] seizures. [ ] shuffling gait.   Ears,Nose,Mouth and Throat: [ ] ear pain [ ] sore throat.   Eyes: [ ] diplopia.   Respiratory: [ ] hemoptysis. [ ] shortness of breath  Cardiovascular: See HPI above  Hematologic/Lymphatic: [ ] anemia. [ ] painful nodes. [ ] prolonged bleeding.   Genitourinary: [ ] hematuria. [ ] flank pain.   Endocrine: [ ] significant change in weight. [ ] intolerance to heat and cold.     Review of systems [ x] otherwise negative, [ ] otherwise unable to obtain    FH: no family history of sudden cardiac death in first degree relatives    SH: [ ] tobacco, [ ] alcohol, [ ] drugs        MEDICATIONS  (STANDING):  aspirin enteric coated 81 milliGRAM(s) Oral daily  atorvastatin 80 milliGRAM(s) Oral at bedtime  buMETAnide Injectable 2 milliGRAM(s) IV Push two times a day  calamine/zinc oxide Lotion 1 Application(s) Topical three times a day  clobetasol 0.05% Cream 1 Application(s) Topical every 12 hours  clopidogrel Tablet 75 milliGRAM(s) Oral daily  dextrose 40% Gel 15 Gram(s) Oral once  dextrose 5%. 1000 milliLiter(s) (50 mL/Hr) IV Continuous <Continuous>  dextrose 5%. 1000 milliLiter(s) (100 mL/Hr) IV Continuous <Continuous>  dextrose 50% Injectable 25 Gram(s) IV Push once  dextrose 50% Injectable 12.5 Gram(s) IV Push once  dextrose 50% Injectable 25 Gram(s) IV Push once  glucagon  Injectable 1 milliGRAM(s) IntraMuscular once  heparin   Injectable 5000 Unit(s) SubCutaneous every 8 hours  hydrALAZINE 25 milliGRAM(s) Oral every 8 hours  insulin glargine Injectable (LANTUS) 65 Unit(s) SubCutaneous at bedtime  insulin glargine Injectable (LANTUS) 30 Unit(s) SubCutaneous every morning  insulin lispro (ADMELOG) corrective regimen sliding scale   SubCutaneous three times a day before meals  insulin lispro (ADMELOG) corrective regimen sliding scale   SubCutaneous at bedtime  insulin lispro Injectable (ADMELOG) 30 Unit(s) SubCutaneous three times a day before meals  levothyroxine Injectable 25 MICROGram(s) IV Push at bedtime  melatonin 3 milliGRAM(s) Oral at bedtime  metoprolol succinate  milliGRAM(s) Oral daily  milrinone Infusion 0.2 MICROgram(s)/kG/Min (2.99 mL/Hr) IV Continuous <Continuous>  senna 2 Tablet(s) Oral at bedtime    MEDICATIONS  (PRN):  polyethylene glycol 3350 17 Gram(s) Oral two times a day PRN Constipation      LABS:                          10.1   10.02 )-----------( 440      ( 2021 05:59 )             33.0     Hemoglobin: 10.1 g/dL ( @ 05:59)  Hemoglobin: 10.7 g/dL ( @ 06:01)  Hemoglobin: 10.8 g/dL ( @ 06:34)  Hemoglobin: 10.1 g/dL ( @ 06:58)        134<L>  |  97  |  125<H>  ----------------------------<  404<H>  4.4   |  19<L>  |  2.10<H>    Ca    10.0      2021 05:59  Phos  4.8       Mg     2.9           Creatinine Trend: 2.10<--, 2.31<--, 2.47<--, 2.54<--, 2.39<--, 2.22<--             21 @ 07:  -  21 @ 07:00  --------------------------------------------------------  IN: 372.9 mL / OUT: 1250 mL / NET: -877.1 mL    21 @ 07:01  -  21 @ 15:25  --------------------------------------------------------  IN: 460 mL / OUT: 150 mL / NET: 310 mL        PHYSICAL EXAM  Vital Signs Last 24 Hrs  T(C): 36.4 (2021 11:23), Max: 37.1 (2021 20:05)  T(F): 97.6 (2021 11:23), Max: 98.8 (2021 04:00)  HR: 76 (2021 15:08) (76 - 87)  BP: 102/64 (2021 15:08) (102/64 - 114/74)  BP(mean): --  RR: 18 (2021 11:23) (17 - 18)  SpO2: 100% (2021 11:23) (93% - 100%)        General: Well nourished in no acute distress. Alert and Oriented * 3.   Head: Normocephalic and atraumatic.   Neck: No JVD. No bruits. Supple. Does not appear to be enlarged.   Cardiovascular: + S1,S2 ; RRR Soft systolic murmur at the left lower sternal border. No rubs noted.    Lungs: CTA b/l. No rhonchi, rales or wheezes.   Abdomen: + BS, soft. Non tender. Non distended. No rebound. No guarding.   Extremities: No clubbing/cyanosis/edema.   Neurologic: Moves all four extremities. Full range of motion.   Skin: Warm and moist. The patient's skin has normal elasticity and good skin turgor.   Psychiatric: Appropriate mood and affect.  Musculoskeletal: Normal range of motion, normal strength    TELEMETRY: SR 70-80s    < from: Cardiac Cath Lab - Adult (21 @ 10:22) >  PROCEDURE:  --  Right heart catheterization.  --  Sonosite - Diagnostic.  COMPLICATIONS: There were no complications.  DIAGNOSTIC RECOMMENDATIONS: The patient should continuewith the present  medications.  Prepared and signed by  Cesar Jackson M.D.  Signed 2021 12:47:04  HEMODYNAMIC TABLES  Pressures:  Baseline  Pressures:  - HR: 71  Pressures:  - Rhythm:  Pressures:  -- Pulmonary Artery (S/D/M): 50/21/32  Pressures:  -- Pulmonary Capillary Wedge: 22/23/19  Pressures:  -- Right Atrium (a/v/M): 19/11/15  Pressures:  -- Right Ventricle (s/edp): 49/10/--  O2 Sats:  Baseline  O2 Sats:  - HR: 71  O2 Sats:  - Rhythm:  O2 Sats:  -- AO: 9.9/98/13.19  O2 Sats:  -- PA: 9.9/55.4/7.46  Outputs:  Baseline  Outputs:  -- CALCULATIONS[age]  Outputs:  -- CALCULATIONS: Body Surface Area: 1.43  Outputs:  -- CALCULATIONS: Height in cm: 150.00  Outputs:  -- CALCULATIONS: Sex: Female  Outputs:  -- CALCULATIONS: Weight in k.90  Outputs:  -- OUTPUTS: CO by Chapin: 3.49  Outputs:  -- OUTPUTS: Chapin cardiac index: 2.44  Outputs:  -- OUTPUTS: O2 consumption: 200.00  Outputs:  -- OUTPUTS: Vo2 Indexed: 139.73  Outputs:  -- RESISTANCES: Pulmonary vascular index (dsc): 426.78  Outputs:  -- RESISTANCES: Pulmonary vascular index (Wood Units): 5.34  Outputs:  -- RESISTANCES: Pulmonary vascular resistance (dsc): 298.18  Outputs:  -- RESISTANCES: Pulmonary vascular resistance (Wood Units): 3.73  Outputs:  -- RESISTANCES: Right ventricular stroke work: 10.61  Outputs:  -- RESISTANCES: Right ventricular stroke work index: 7.41  Outputs:  -- RESISTANCES: Total pulmonary index (dsc): 1050.53  Outputs:  -- RESISTANCES: Total pulmonary index (Wood Units): 13.13  Outputs:  -- RESISTANCES: Total pulmonary resistance (dsc): 733.98  Outputs:  -- RESISTANCES: Total pulmonary resistance (Wood Units): 9.18  Outputs:  -- SHUNTS: Pulmonary flow: 3.49  Outputs:  -- SHUNTS: Qp Indexed: 2.44  Outputs:  -- SHUNTS: Qs Indexed: 2.44  Outputs:  -- SHUNTS: Systemic flow: 3.49    < end of copied text >      A/P) 74 y/o female PMH HTN, HLD, DM, CAD s/p CABG 2014 and prior PCI, severe mitral regurgitation (s/p mitral clip ), pulmonary HTN, HF (EF 21 % 2019), CKD IV not on dialysis (b/l SCr 2-2.2), hypothyroidism a/w acute on chronic systolic CHF with NYHA IV functional status.    -repeat echo noted but didn't reassess degree of mitral stenosis  -RHC noted above - Filling pressures are appropriate considering LV function and the mitral valve gradient  -would transition to PO bumex and stop milrinone  -continue Toprol XL  -very poor candidate for a primary prevention ICD  -f/u with Ashleigh on  at 1220pm - will reschedule appt    Amauri Hill PA-C  Pager: [phone]

## 2021-01-21 NOTE — PROGRESS NOTE ADULT - PROBLEM SELECTOR PLAN 5
-Takes 60 units of lantus at bedtime and 18 units of humalog   - increased Lantus and Admelog based on sliding scale.  Will continue to titrate to achieve BS goal 140-180.  Increased insulin dosages on 1/18, 1/19.  - consulted endocrinology for further recommendations - recommended increasing lantus to 65 however AM sugars still heavily elevated.  Started BID Lantus dosing 1/21

## 2021-01-21 NOTE — PROGRESS NOTE ADULT - SUBJECTIVE AND OBJECTIVE BOX
Chief complaint  Patient is a 75y old  Female who presents with a chief complaint of increased work of breathing (21 Jan 2021 12:25)   Review of systems  Patient in bed, looks comfortable, no hypoglycemic episodes.    Labs and Fingersticks  CAPILLARY BLOOD GLUCOSE      POCT Blood Glucose.: 424 mg/dL (21 Jan 2021 11:22)  POCT Blood Glucose.: 416 mg/dL (21 Jan 2021 11:20)  POCT Blood Glucose.: 373 mg/dL (21 Jan 2021 07:36)  POCT Blood Glucose.: 436 mg/dL (20 Jan 2021 21:02)  POCT Blood Glucose.: 415 mg/dL (20 Jan 2021 16:17)    Medications  MEDICATIONS  (STANDING):  aspirin enteric coated 81 milliGRAM(s) Oral daily  atorvastatin 80 milliGRAM(s) Oral at bedtime  buMETAnide Injectable 2 milliGRAM(s) IV Push two times a day  calamine/zinc oxide Lotion 1 Application(s) Topical three times a day  clobetasol 0.05% Cream 1 Application(s) Topical every 12 hours  clopidogrel Tablet 75 milliGRAM(s) Oral daily  dextrose 40% Gel 15 Gram(s) Oral once  dextrose 5%. 1000 milliLiter(s) (50 mL/Hr) IV Continuous <Continuous>  dextrose 5%. 1000 milliLiter(s) (100 mL/Hr) IV Continuous <Continuous>  dextrose 50% Injectable 25 Gram(s) IV Push once  dextrose 50% Injectable 12.5 Gram(s) IV Push once  dextrose 50% Injectable 25 Gram(s) IV Push once  glucagon  Injectable 1 milliGRAM(s) IntraMuscular once  heparin   Injectable 5000 Unit(s) SubCutaneous every 8 hours  hydrALAZINE 25 milliGRAM(s) Oral every 8 hours  insulin glargine Injectable (LANTUS) 65 Unit(s) SubCutaneous at bedtime  insulin glargine Injectable (LANTUS) 30 Unit(s) SubCutaneous every morning  insulin lispro (ADMELOG) corrective regimen sliding scale   SubCutaneous three times a day before meals  insulin lispro (ADMELOG) corrective regimen sliding scale   SubCutaneous at bedtime  insulin lispro Injectable (ADMELOG) 30 Unit(s) SubCutaneous three times a day before meals  levothyroxine Injectable 25 MICROGram(s) IV Push at bedtime  melatonin 3 milliGRAM(s) Oral at bedtime  metoprolol succinate  milliGRAM(s) Oral daily  milrinone Infusion 0.2 MICROgram(s)/kG/Min (2.99 mL/Hr) IV Continuous <Continuous>  senna 2 Tablet(s) Oral at bedtime      Physical Exam  General: Patient comfortable in bed  Vital Signs Last 12 Hrs  T(F): 97.6 (01-21-21 @ 11:23), Max: 98.8 (01-21-21 @ 04:00)  HR: 78 (01-21-21 @ 11:23) (78 - 82)  BP: 114/74 (01-21-21 @ 11:23) (106/57 - 114/74)  BP(mean): --  RR: 18 (01-21-21 @ 11:23) (18 - 18)  SpO2: 100% (01-21-21 @ 11:23) (98% - 100%)  Neck: No palpable thyroid nodules.         Chief complaint  Patient is a 75y old  Female who presents with a chief complaint of increased work of breathing (21 Jan 2021 12:25)   Review of systems  Patient in bed, looks comfortable, no hypoglycemic episodes.    Labs and Fingersticks  CAPILLARY BLOOD GLUCOSE      POCT Blood Glucose.: 424 mg/dL (21 Jan 2021 11:22)  POCT Blood Glucose.: 416 mg/dL (21 Jan 2021 11:20)  POCT Blood Glucose.: 373 mg/dL (21 Jan 2021 07:36)  POCT Blood Glucose.: 436 mg/dL (20 Jan 2021 21:02)  POCT Blood Glucose.: 415 mg/dL (20 Jan 2021 16:17)    Medications  MEDICATIONS  (STANDING):  aspirin enteric coated 81 milliGRAM(s) Oral daily  atorvastatin 80 milliGRAM(s) Oral at bedtime  buMETAnide Injectable 2 milliGRAM(s) IV Push two times a day  calamine/zinc oxide Lotion 1 Application(s) Topical three times a day  clobetasol 0.05% Cream 1 Application(s) Topical every 12 hours  clopidogrel Tablet 75 milliGRAM(s) Oral daily  dextrose 40% Gel 15 Gram(s) Oral once  dextrose 5%. 1000 milliLiter(s) (50 mL/Hr) IV Continuous <Continuous>  dextrose 5%. 1000 milliLiter(s) (100 mL/Hr) IV Continuous <Continuous>  dextrose 50% Injectable 25 Gram(s) IV Push once  dextrose 50% Injectable 12.5 Gram(s) IV Push once  dextrose 50% Injectable 25 Gram(s) IV Push once  glucagon  Injectable 1 milliGRAM(s) IntraMuscular once  heparin   Injectable 5000 Unit(s) SubCutaneous every 8 hours  hydrALAZINE 25 milliGRAM(s) Oral every 8 hours  insulin glargine Injectable (LANTUS) 65 Unit(s) SubCutaneous at bedtime  insulin glargine Injectable (LANTUS) 30 Unit(s) SubCutaneous every morning  insulin lispro (ADMELOG) corrective regimen sliding scale   SubCutaneous three times a day before meals  insulin lispro (ADMELOG) corrective regimen sliding scale   SubCutaneous at bedtime  insulin lispro Injectable (ADMELOG) 30 Unit(s) SubCutaneous three times a day before meals  levothyroxine Injectable 25 MICROGram(s) IV Push at bedtime  melatonin 3 milliGRAM(s) Oral at bedtime  metoprolol succinate  milliGRAM(s) Oral daily  milrinone Infusion 0.2 MICROgram(s)/kG/Min (2.99 mL/Hr) IV Continuous <Continuous>  senna 2 Tablet(s) Oral at bedtime      Physical Exam  General: Patient comfortable in bed  Vital Signs Last 12 Hrs  T(F): 97.6 (01-21-21 @ 11:23), Max: 98.8 (01-21-21 @ 04:00)  HR: 78 (01-21-21 @ 11:23) (78 - 82)  BP: 114/74 (01-21-21 @ 11:23) (106/57 - 114/74)  BP(mean): --  RR: 18 (01-21-21 @ 11:23) (18 - 18)  SpO2: 100% (01-21-21 @ 11:23) (98% - 100%)  Neck: No palpable thyroid nodules.

## 2021-01-21 NOTE — PROGRESS NOTE ADULT - ASSESSMENT
76 yo F w/ PMH of CKD stage 4 (baseline Cr 1.9-2.2) HTN, T2DM, CAD s/p CABG X3 (2014 at [facility]), non-dilated ICM (EF 20-25%), severe mitral regurgitation s/p mitral clip (6/13/19), severe pulm HTN, hypothyroidism who presented for evaluation of SOB and was admitted for ADHF.    CKD stage 4  - baseline Cr 1.9-2.2; has had recurrent MERVIN's over the years  - CKD is likely 2/2 recurrent MERVIN's + underlying DM/HTN  - Scr stable   - BUN elevated due to diuretics. Diuretics per heart failure team   - Avoid hypotension   - renal sonogram in the past with simple renal cyst  - Avoid nephrotoxins including NSAIDs, IV contrast (if possible), [employer] products  - monitor I/O's accurately    HTN  BP controlled  Low salt diet   MOnitor BP    Proteinuria/Hematuria  - likely from underlying DM  - has 1.8 grams proteinuria  - Hep B, Hep C, HIV RF, C3, C4, p-ANCA, c-ANCA, RPR neg  - weak gamma-migrating protein, weak IgG lambda protein band noted in the past. Pt to follow w/ heme   - DEAN 1:320 positive  - RPR neg, FTA +

## 2021-01-21 NOTE — PROGRESS NOTE ADULT - PROBLEM SELECTOR PLAN 1
- 2/2 CHF exacerbation.  EF 20% w/ mitral stenosis   - s/p TID 4mg Bumex; stopped 1/18 b/c of increased Cr and euvolemic status   - CXR on admission showed bilateral predominantly lower lobe hazy opacities which is likely 2/2 fluid.  Not presenting w/ clinical signs of PNA and CT chest showed no focal consolidations.  Monitoring of abx.  Blood cx also negative  - s/p Lopressor for MS; was titrating dose to achieve HR 60-70.  D/C'd 1/20 after RHC 1/20 showed elevated filling pressures.  Pt now on milrinone drip and Bumex restarted - 2/2 CHF exacerbation.  EF 20% w/ mitral stenosis   - s/p TID 4mg Bumex; stopped 1/18 b/c of increased Cr and euvolemic status   - CXR on admission showed bilateral predominantly lower lobe hazy opacities which is likely 2/2 fluid.  Not presenting w/ clinical signs of PNA and CT chest showed no focal consolidations.  Monitoring of abx.  Blood cx also negative  - s/p Lopressor for MS; was titrating dose to achieve HR 60-70.  D/C'd 1/20 after RHC 1/20 showed elevated filling pressures.  Pt now on milrinone drip and Bumex restarted.  Bladder scan shows no evidence of retention (120cc)

## 2021-01-21 NOTE — PROGRESS NOTE ADULT - ATTENDING COMMENTS
RA 15, RV 49/10, PA 50/21 32, PCWp 19, PA 55.4, Ao 98. Chapin 3.5/2.5  initiated on milrinone .2, bumex 2 IV bid.   meds: HDZN 25 Q 8, toprol Xl 100,   70-80SR, 106/-113/, no change in weight   CXR PA last mild PVC.   01-21    134<L>  |  97  |  125<H>  ----------------------------<  404<H>  4.4   |  19<L>  |  2.10<H>  Cr 2.1, 2.3    Ca    10.0      21 Jan 2021 05:59  Phos  4.8     01-21  Mg     2.9     01-21                        10.1   10.02 )-----------( 440      ( 21 Jan 2021 05:59 )             33.0   WBC 10.0, 9.7  some improvement in BP on milrinone   some of the dyspnea may be non cardiogenic.   Suggest:  increase milrinone .25.   target 2-3 lbs weight loss and then switch to PO  Good [place]

## 2021-01-21 NOTE — PROGRESS NOTE ADULT - PROBLEM SELECTOR PLAN 1
increase milrinone to 0.25 mcg/kg/min  c/w Bumex 2 mg IV BID  c/w hydral 25 mg q8h  c/w Toprol  mg daily; goal HR 60-70  daily standing weight and strict I&Os  CXR this morning with improved PVC  will target 2-3 lbs weight loss and then switch to PO

## 2021-01-21 NOTE — PROGRESS NOTE ADULT - SUBJECTIVE AND OBJECTIVE BOX
[facility] [place] [facility]   MD Dion Dasilva MD, D.O. Ruoru Wong, PA    From 7 AM - 5 PM:  OFFICE: [phone]  Dr. Muhammad cell: [phone]  Dr. Montero cell: [phone]  Dr. Soria cell: [phone]  ARSHIDA Smith cell: [phone]    From 5 PM - 7 AM: Answering Service: [phone]  Date of service: 01-21-21 @ 11:50    RENAL FOLLOW UP NOTE  --------------------------------------------------------------------------------  HPI:  Pt seen and examined at bedside.       PAST HISTORY  --------------------------------------------------------------------------------  No significant changes to PMH, PSH, FHx, SHx, unless otherwise noted    ALLERGIES & MEDICATIONS  --------------------------------------------------------------------------------  Allergies    azithromycin (Hives; Pruritus)    Intolerances      Standing Inpatient Medications  aspirin enteric coated 81 milliGRAM(s) Oral daily  atorvastatin 80 milliGRAM(s) Oral at bedtime  buMETAnide Injectable 2 milliGRAM(s) IV Push two times a day  calamine/zinc oxide Lotion 1 Application(s) Topical three times a day  clobetasol 0.05% Cream 1 Application(s) Topical every 12 hours  clopidogrel Tablet 75 milliGRAM(s) Oral daily  dextrose 40% Gel 15 Gram(s) Oral once  dextrose 5%. 1000 milliLiter(s) IV Continuous <Continuous>  dextrose 5%. 1000 milliLiter(s) IV Continuous <Continuous>  dextrose 50% Injectable 25 Gram(s) IV Push once  dextrose 50% Injectable 12.5 Gram(s) IV Push once  dextrose 50% Injectable 25 Gram(s) IV Push once  glucagon  Injectable 1 milliGRAM(s) IntraMuscular once  heparin   Injectable 5000 Unit(s) SubCutaneous every 8 hours  hydrALAZINE 25 milliGRAM(s) Oral every 8 hours  insulin glargine Injectable (LANTUS) 65 Unit(s) SubCutaneous at bedtime  insulin glargine Injectable (LANTUS) 30 Unit(s) SubCutaneous every morning  insulin lispro (ADMELOG) corrective regimen sliding scale   SubCutaneous three times a day before meals  insulin lispro (ADMELOG) corrective regimen sliding scale   SubCutaneous at bedtime  insulin lispro Injectable (ADMELOG) 30 Unit(s) SubCutaneous three times a day before meals  levothyroxine Injectable 25 MICROGram(s) IV Push at bedtime  melatonin 3 milliGRAM(s) Oral at bedtime  metoprolol succinate  milliGRAM(s) Oral daily  milrinone Infusion 0.2 MICROgram(s)/kG/Min IV Continuous <Continuous>  senna 2 Tablet(s) Oral at bedtime    PRN Inpatient Medications  polyethylene glycol 3350 17 Gram(s) Oral two times a day PRN      REVIEW OF SYSTEMS  --------------------------------------------------------------------------------  General: no fever  CVS: no chest pain  RESP: no sob, no cough  ABD: no abdominal pain  : no dysuria,  MSK: no edema     VITALS/PHYSICAL EXAM  --------------------------------------------------------------------------------  T(C): 36.4 (01-21-21 @ 11:23), Max: 37.1 (01-20-21 @ 20:05)  HR: 78 (01-21-21 @ 11:23) (70 - 87)  BP: 114/74 (01-21-21 @ 11:23) (98/61 - 114/74)  RR: 18 (01-21-21 @ 11:23) (16 - 18)  SpO2: 100% (01-21-21 @ 11:23) (93% - 100%)  Wt(kg): --    01-20-21 @ 07:01  -  01-21-21 @ 07:00  --------------------------------------------------------  IN: 372.9 mL / OUT: 1250 mL / NET: -877.1 mL    01-21-21 @ 07:01  -  01-21-21 @ 11:50  --------------------------------------------------------  IN: 240 mL / OUT: 0 mL / NET: 240 mL      Physical Exam:  	Gen: NAD  	HEENT: MMM  	Pulm: CTA B/L  	CV: S1S2  	Abd: Soft, +BS  	Ext: No LE edema B/L                      Neuro: Awake   	Skin: Warm and Dry       LABS/STUDIES  --------------------------------------------------------------------------------              10.1   10.02 >-----------<  440      [01-21-21 @ 05:59]              33.0     134  |  97  |  125  ----------------------------<  404      [01-21-21 @ 05:59]  4.4   |  19  |  2.10        Ca     10.0     [01-21-21 @ 05:59]      Mg     2.9     [01-21-21 @ 05:59]      Phos  4.8     [01-21-21 @ 05:59]      PT/INR: PT 12.5 , INR 1.05       [01-20-21 @ 07:40]  PTT: 43.5       [01-20-21 @ 07:40]    Creatinine Trend:  SCr 2.10 [01-21 @ 05:59]  SCr 2.31 [01-20 @ 06:00]  SCr 2.47 [01-19 @ 06:01]  SCr 2.54 [01-18 @ 18:03]  SCr 2.39 [01-18 @ 06:34]    Urinalysis - [01-12-21 @ 19:28]      Color Light Yellow / Appearance Clear / SG 1.011 / pH 6.0      Gluc Negative / Ketone Negative  / Bili Negative / Urobili Negative       Blood Negative / Protein 30 mg/dL / Leuk Est Moderate / Nitrite Negative      RBC 1 / WBC 5 / Hyaline 4 / Gran  / Sq Epi  / Non Sq Epi 1 / Bacteria Negative      Ferritin 111      [01-13-21 @ 01:42]  HbA1c 7.5      [10-08-19 @ 14:30]  TSH 2.15      [01-13-21 @ 10:07]

## 2021-01-21 NOTE — PROGRESS NOTE ADULT - PROBLEM SELECTOR PLAN 1
Will continue increased-dose Lantus 30u AM, 65u at bedtime.  Will increase Admelog to 30u before each meal and continue Admelog correction scale coverage. Will continue monitoring FS and FU.  Patient counseled for compliance with consistent low carb diet and exercise as tolerated outpatient. Will continue increased-dose Lantus 30u AM, 65u at bedtime.  Will increase Admelog to 35u before each meal and continue Admelog correction scale coverage. Will continue monitoring FS and FU.  Patient counseled for compliance with consistent low carb diet and exercise as tolerated outpatient.

## 2021-01-21 NOTE — PROGRESS NOTE ADULT - PROBLEM SELECTOR PLAN 2
-TTE 1/13 showed EF of 20% with global LV and RV dysfunction w/ MS  - Heart failure and cardiology following  - stopped bumex 1/18 for euvolemic status and increasing Cr   - s/p Lopressor for MS; was titrating dose to achieve HR 60-70.  Lopressor D/C'd 1/20 after RHC 1/20 showed elevated filling pressures.  Pt now on milrinone drip and Bumex restarted  - per HF not dobutamine indicated at this time

## 2021-01-22 DIAGNOSIS — D72.829 ELEVATED WHITE BLOOD CELL COUNT, UNSPECIFIED: ICD-10-CM

## 2021-01-22 LAB
ANION GAP SERPL CALC-SCNC: 19 MMOL/L — HIGH (ref 5–17)
APPEARANCE UR: CLEAR — SIGNIFICANT CHANGE UP
BILIRUB UR-MCNC: NEGATIVE — SIGNIFICANT CHANGE UP
BUN SERPL-MCNC: 117 MG/DL — HIGH (ref 7–23)
CALCIUM SERPL-MCNC: 9.7 MG/DL — SIGNIFICANT CHANGE UP (ref 8.4–10.5)
CHLORIDE SERPL-SCNC: 97 MMOL/L — SIGNIFICANT CHANGE UP (ref 96–108)
CO2 SERPL-SCNC: 19 MMOL/L — LOW (ref 22–31)
COLOR SPEC: SIGNIFICANT CHANGE UP
CREAT SERPL-MCNC: 2.15 MG/DL — HIGH (ref 0.5–1.3)
DIFF PNL FLD: NEGATIVE — SIGNIFICANT CHANGE UP
GLUCOSE BLDC GLUCOMTR-MCNC: 266 MG/DL — HIGH (ref 70–99)
GLUCOSE BLDC GLUCOMTR-MCNC: 268 MG/DL — HIGH (ref 70–99)
GLUCOSE BLDC GLUCOMTR-MCNC: 306 MG/DL — HIGH (ref 70–99)
GLUCOSE BLDC GLUCOMTR-MCNC: 308 MG/DL — HIGH (ref 70–99)
GLUCOSE BLDC GLUCOMTR-MCNC: 333 MG/DL — HIGH (ref 70–99)
GLUCOSE SERPL-MCNC: 255 MG/DL — HIGH (ref 70–99)
GLUCOSE UR QL: ABNORMAL
HCT VFR BLD CALC: 33 % — LOW (ref 34.5–45)
HGB BLD-MCNC: 10.3 G/DL — LOW (ref 11.5–15.5)
KETONES UR-MCNC: NEGATIVE — SIGNIFICANT CHANGE UP
LEUKOCYTE ESTERASE UR-ACNC: ABNORMAL
MAGNESIUM SERPL-MCNC: 2.6 MG/DL — SIGNIFICANT CHANGE UP (ref 1.6–2.6)
MCHC RBC-ENTMCNC: 26.1 PG — LOW (ref 27–34)
MCHC RBC-ENTMCNC: 31.2 GM/DL — LOW (ref 32–36)
MCV RBC AUTO: 83.5 FL — SIGNIFICANT CHANGE UP (ref 80–100)
NITRITE UR-MCNC: NEGATIVE — SIGNIFICANT CHANGE UP
NRBC # BLD: 0 /100 WBCS — SIGNIFICANT CHANGE UP (ref 0–0)
PH UR: 5.5 — SIGNIFICANT CHANGE UP (ref 5–8)
PHOSPHATE SERPL-MCNC: 4.4 MG/DL — SIGNIFICANT CHANGE UP (ref 2.5–4.5)
PLATELET # BLD AUTO: 448 K/UL — HIGH (ref 150–400)
POTASSIUM SERPL-MCNC: 4 MMOL/L — SIGNIFICANT CHANGE UP (ref 3.5–5.3)
POTASSIUM SERPL-SCNC: 4 MMOL/L — SIGNIFICANT CHANGE UP (ref 3.5–5.3)
PROT UR-MCNC: ABNORMAL
RBC # BLD: 3.95 M/UL — SIGNIFICANT CHANGE UP (ref 3.8–5.2)
RBC # FLD: 16.3 % — HIGH (ref 10.3–14.5)
SODIUM SERPL-SCNC: 135 MMOL/L — SIGNIFICANT CHANGE UP (ref 135–145)
SP GR SPEC: 1.01 — SIGNIFICANT CHANGE UP (ref 1.01–1.02)
UROBILINOGEN FLD QL: NEGATIVE — SIGNIFICANT CHANGE UP
WBC # BLD: 12.07 K/UL — HIGH (ref 3.8–10.5)
WBC # FLD AUTO: 12.07 K/UL — HIGH (ref 3.8–10.5)

## 2021-01-22 PROCEDURE — 99233 SBSQ HOSP IP/OBS HIGH 50: CPT

## 2021-01-22 PROCEDURE — 99232 SBSQ HOSP IP/OBS MODERATE 35: CPT | Mod: GC

## 2021-01-22 RX ORDER — INSULIN LISPRO 100/ML
39 VIAL (ML) SUBCUTANEOUS
Refills: 0 | Status: DISCONTINUED | OUTPATIENT
Start: 2021-01-22 | End: 2021-01-25

## 2021-01-22 RX ORDER — BUMETANIDE 0.25 MG/ML
3 INJECTION INTRAMUSCULAR; INTRAVENOUS EVERY 8 HOURS
Refills: 0 | Status: DISCONTINUED | OUTPATIENT
Start: 2021-01-22 | End: 2021-01-23

## 2021-01-22 RX ORDER — INSULIN GLARGINE 100 [IU]/ML
54 INJECTION, SOLUTION SUBCUTANEOUS EVERY MORNING
Refills: 0 | Status: DISCONTINUED | OUTPATIENT
Start: 2021-01-23 | End: 2021-01-24

## 2021-01-22 RX ORDER — INSULIN GLARGINE 100 [IU]/ML
54 INJECTION, SOLUTION SUBCUTANEOUS AT BEDTIME
Refills: 0 | Status: DISCONTINUED | OUTPATIENT
Start: 2021-01-22 | End: 2021-01-24

## 2021-01-22 RX ORDER — BUMETANIDE 0.25 MG/ML
3 INJECTION INTRAMUSCULAR; INTRAVENOUS EVERY 8 HOURS
Refills: 0 | Status: DISCONTINUED | OUTPATIENT
Start: 2021-01-22 | End: 2021-01-22

## 2021-01-22 RX ORDER — INSULIN GLARGINE 100 [IU]/ML
14 INJECTION, SOLUTION SUBCUTANEOUS ONCE
Refills: 0 | Status: COMPLETED | OUTPATIENT
Start: 2021-01-22 | End: 2021-01-22

## 2021-01-22 RX ADMIN — Medication 1 APPLICATION(S): at 06:11

## 2021-01-22 RX ADMIN — Medication 6: at 08:23

## 2021-01-22 RX ADMIN — Medication 35 UNIT(S): at 08:16

## 2021-01-22 RX ADMIN — Medication 35 UNIT(S): at 12:28

## 2021-01-22 RX ADMIN — Medication 100 MILLIGRAM(S): at 06:11

## 2021-01-22 RX ADMIN — INSULIN GLARGINE 54 UNIT(S): 100 INJECTION, SOLUTION SUBCUTANEOUS at 22:19

## 2021-01-22 RX ADMIN — Medication 25 MILLIGRAM(S): at 15:56

## 2021-01-22 RX ADMIN — Medication 81 MILLIGRAM(S): at 12:31

## 2021-01-22 RX ADMIN — BUMETANIDE 124 MILLIGRAM(S): 0.25 INJECTION INTRAMUSCULAR; INTRAVENOUS at 15:55

## 2021-01-22 RX ADMIN — Medication 25 MILLIGRAM(S): at 06:11

## 2021-01-22 RX ADMIN — HEPARIN SODIUM 5000 UNIT(S): 5000 INJECTION INTRAVENOUS; SUBCUTANEOUS at 22:14

## 2021-01-22 RX ADMIN — MILRINONE LACTATE 3.74 MICROGRAM(S)/KG/MIN: 1 INJECTION, SOLUTION INTRAVENOUS at 15:57

## 2021-01-22 RX ADMIN — Medication 4: at 22:16

## 2021-01-22 RX ADMIN — BUMETANIDE 2 MILLIGRAM(S): 0.25 INJECTION INTRAMUSCULAR; INTRAVENOUS at 06:11

## 2021-01-22 RX ADMIN — HEPARIN SODIUM 5000 UNIT(S): 5000 INJECTION INTRAVENOUS; SUBCUTANEOUS at 15:56

## 2021-01-22 RX ADMIN — CALAMINE AND ZINC OXIDE AND PHENOL 1 APPLICATION(S): 160; 10 LOTION TOPICAL at 06:10

## 2021-01-22 RX ADMIN — CALAMINE AND ZINC OXIDE AND PHENOL 1 APPLICATION(S): 160; 10 LOTION TOPICAL at 22:20

## 2021-01-22 RX ADMIN — Medication 8: at 12:28

## 2021-01-22 RX ADMIN — Medication 1 APPLICATION(S): at 17:16

## 2021-01-22 RX ADMIN — CALAMINE AND ZINC OXIDE AND PHENOL 1 APPLICATION(S): 160; 10 LOTION TOPICAL at 15:55

## 2021-01-22 RX ADMIN — Medication 25 MICROGRAM(S): at 22:15

## 2021-01-22 RX ADMIN — Medication 3 MILLIGRAM(S): at 22:13

## 2021-01-22 RX ADMIN — SENNA PLUS 2 TABLET(S): 8.6 TABLET ORAL at 22:13

## 2021-01-22 RX ADMIN — HEPARIN SODIUM 5000 UNIT(S): 5000 INJECTION INTRAVENOUS; SUBCUTANEOUS at 06:11

## 2021-01-22 RX ADMIN — Medication 8: at 16:32

## 2021-01-22 RX ADMIN — ATORVASTATIN CALCIUM 80 MILLIGRAM(S): 80 TABLET, FILM COATED ORAL at 22:13

## 2021-01-22 RX ADMIN — Medication 39 UNIT(S): at 16:32

## 2021-01-22 RX ADMIN — CLOPIDOGREL BISULFATE 75 MILLIGRAM(S): 75 TABLET, FILM COATED ORAL at 12:31

## 2021-01-22 RX ADMIN — INSULIN GLARGINE 14 UNIT(S): 100 INJECTION, SOLUTION SUBCUTANEOUS at 12:29

## 2021-01-22 RX ADMIN — BUMETANIDE 124 MILLIGRAM(S): 0.25 INJECTION INTRAMUSCULAR; INTRAVENOUS at 23:11

## 2021-01-22 RX ADMIN — MILRINONE LACTATE 3.74 MICROGRAM(S)/KG/MIN: 1 INJECTION, SOLUTION INTRAVENOUS at 22:14

## 2021-01-22 NOTE — PROGRESS NOTE ADULT - PROBLEM SELECTOR PLAN 2
- s/p clip; has component of mitral stenosis (mean gradient 8-9); will be impt to keep HR controlled with BB as above

## 2021-01-22 NOTE — PROGRESS NOTE ADULT - SUBJECTIVE AND OBJECTIVE BOX
Subjective:    Medications:  aspirin enteric coated 81 milliGRAM(s) Oral daily  atorvastatin 80 milliGRAM(s) Oral at bedtime  buMETAnide Injectable 2 milliGRAM(s) IV Push two times a day  calamine/zinc oxide Lotion 1 Application(s) Topical three times a day  clobetasol 0.05% Cream 1 Application(s) Topical every 12 hours  clopidogrel Tablet 75 milliGRAM(s) Oral daily  dextrose 40% Gel 15 Gram(s) Oral once  dextrose 5%. 1000 milliLiter(s) IV Continuous <Continuous>  dextrose 5%. 1000 milliLiter(s) IV Continuous <Continuous>  dextrose 50% Injectable 25 Gram(s) IV Push once  dextrose 50% Injectable 12.5 Gram(s) IV Push once  dextrose 50% Injectable 25 Gram(s) IV Push once  glucagon  Injectable 1 milliGRAM(s) IntraMuscular once  heparin   Injectable 5000 Unit(s) SubCutaneous every 8 hours  hydrALAZINE 25 milliGRAM(s) Oral every 8 hours  insulin glargine Injectable (LANTUS) 54 Unit(s) SubCutaneous at bedtime  insulin glargine Injectable (LANTUS) 14 Unit(s) SubCutaneous once  insulin lispro (ADMELOG) corrective regimen sliding scale   SubCutaneous three times a day before meals  insulin lispro (ADMELOG) corrective regimen sliding scale   SubCutaneous at bedtime  insulin lispro Injectable (ADMELOG) 35 Unit(s) SubCutaneous three times a day before meals  levothyroxine Injectable 25 MICROGram(s) IV Push at bedtime  melatonin 3 milliGRAM(s) Oral at bedtime  metoprolol succinate  milliGRAM(s) Oral daily  milrinone Infusion 0.25 MICROgram(s)/kG/Min IV Continuous <Continuous>  polyethylene glycol 3350 17 Gram(s) Oral two times a day PRN  senna 2 Tablet(s) Oral at bedtime      Physical Exam:    Vitals:  Vital Signs Last 24 Hours  T(C): 36.7 (21 @ 11:11), Max: 36.7 (21 @ 11:11)  HR: 88 (21 @ 11:11) (76 - 90)  BP: 98/59 (21 @ 11:11) (93/53 - 130/71)  RR: 19 (21 @ 11:11) (17 - 19)  SpO2: 98% (21 @ 11:11) (96% - 100%)    Weight in k ( @ 10:50)    I&O's Summary    2021 07:01  -  2021 07:00  --------------------------------------------------------  IN: 580 mL / OUT: 900 mL / NET: -320 mL        Tele:    General: No distress. Comfortable.  HEENT: EOM intact.  Neck: Neck supple. JVP not elevated. No masses  Chest: Clear to auscultation bilaterally  CV: Normal S1 and S2. No murmurs, rub, or gallops. Radial pulses normal.  Abdomen: Soft, non-distended, non-tender  Skin: No rashes or skin breakdown  Neurology: Alert and oriented times three. Sensation intact  Psych: Affect normal    Labs:                        10.3   12.07 )-----------( 448      ( 2021 05:35 )             33.0         135  |  97  |  117<H>  ----------------------------<  255<H>  4.0   |  19<L>  |  2.15<H>    Ca    9.7      2021 05:35  Phos  4.4       Mg     2.6                          Subjective:  - NAEO, reports no improvement in ZEE  - Denies SOB at rest, LH and dizziness     Medications:  aspirin enteric coated 81 milliGRAM(s) Oral daily  atorvastatin 80 milliGRAM(s) Oral at bedtime  buMETAnide Injectable 2 milliGRAM(s) IV Push two times a day  calamine/zinc oxide Lotion 1 Application(s) Topical three times a day  clobetasol 0.05% Cream 1 Application(s) Topical every 12 hours  clopidogrel Tablet 75 milliGRAM(s) Oral daily  dextrose 40% Gel 15 Gram(s) Oral once  dextrose 5%. 1000 milliLiter(s) IV Continuous <Continuous>  dextrose 5%. 1000 milliLiter(s) IV Continuous <Continuous>  dextrose 50% Injectable 25 Gram(s) IV Push once  dextrose 50% Injectable 12.5 Gram(s) IV Push once  dextrose 50% Injectable 25 Gram(s) IV Push once  glucagon  Injectable 1 milliGRAM(s) IntraMuscular once  heparin   Injectable 5000 Unit(s) SubCutaneous every 8 hours  hydrALAZINE 25 milliGRAM(s) Oral every 8 hours  insulin glargine Injectable (LANTUS) 54 Unit(s) SubCutaneous at bedtime  insulin glargine Injectable (LANTUS) 14 Unit(s) SubCutaneous once  insulin lispro (ADMELOG) corrective regimen sliding scale   SubCutaneous three times a day before meals  insulin lispro (ADMELOG) corrective regimen sliding scale   SubCutaneous at bedtime  insulin lispro Injectable (ADMELOG) 35 Unit(s) SubCutaneous three times a day before meals  levothyroxine Injectable 25 MICROGram(s) IV Push at bedtime  melatonin 3 milliGRAM(s) Oral at bedtime  metoprolol succinate  milliGRAM(s) Oral daily  milrinone Infusion 0.25 MICROgram(s)/kG/Min IV Continuous <Continuous>  polyethylene glycol 3350 17 Gram(s) Oral two times a day PRN  senna 2 Tablet(s) Oral at bedtime      Physical Exam:    Vitals:  Vital Signs Last 24 Hours  T(C): 36.7 (21 @ 11:11), Max: 36.7 (21 @ 11:11)  HR: 88 (21 @ 11:11) (76 - 90)  BP: 98/59 (21 @ 11:11) (93/53 - 130/71)  RR: 19 (21 @ 11:11) (17 - 19)  SpO2: 98% (21 @ 11:11) (96% - 100%)    Weight in k ( @ 10:50)    I&O's Summary    2021 07:01  -  2021 07:00  --------------------------------------------------------  IN: 580 mL / OUT: 900 mL / NET: -320 mL    Tele: SR 80-90s    General: No distress. Comfortable.  HEENT: EOM intact.  Neck: Neck supple. JVP mildly elevated.  Chest: Clear to auscultation bilaterally  CV: RRR. Normal S1 and S2. No murmurs, rub, or gallops. Radial pulses normal.  Abdomen: Soft, non-distended, non-tender  Skin: No rashes or skin breakdown  Extremities: Warm, no edema  Neurology: Alert and oriented times three. Sensation intact  Psych: Affect normal    Labs:                        10.3   12.07 )-----------( 448      ( 2021 05:35 )             33.0         135  |  97  |  117<H>  ----------------------------<  255<H>  4.0   |  19<L>  |  2.15<H>    Ca    9.7      2021 05:35  Phos  4.4       Mg     2.6

## 2021-01-22 NOTE — PROGRESS NOTE ADULT - ASSESSMENT
76 yo F w/ PMH of CKD stage 4 (baseline Cr 1.9-2.2) HTN, T2DM, CAD s/p CABG X3 (2014 at [facility]), non-dilated ICM (EF 20-25%), severe mitral regurgitation s/p mitral clip (6/13/19), severe pulm HTN, hypothyroidism who presented for evaluation of SOB and was admitted for ADHF.    CKD stage 4  - baseline Cr 1.9-2.2; has had recurrent MERVIN's over the years  - CKD is likely 2/2 recurrent MERVIN's + underlying DM/HTN  - Scr stable. BUN elevated due to diuretics. Diuretics per heart failure team   - Avoid hypotension   - renal sonogram in the past with simple renal cyst  - Avoid nephrotoxins including NSAIDs, IV contrast (if possible), [employer] products  - monitor I/O's accurately    HTN  BP controlled  Low salt diet   MOnitor BP    Proteinuria/Hematuria  - likely from underlying DM  - has 1.8 grams proteinuria  - Hep B, Hep C, HIV RF, C3, C4, p-ANCA, c-ANCA, RPR neg  - weak gamma-migrating protein, weak IgG lambda protein band noted in the past. Pt to follow w/ heme   - DEAN 1:320 positive  - RPR neg, FTA +

## 2021-01-22 NOTE — DISCHARGE NOTE PROVIDER - NSDCCPGOAL_GEN_ALL_CORE_FT
To get better and follow your care plan as instructed. Area L Indication Text: Tumors in this location are included in Area L (trunk and extremities).  Mohs surgery is indicated for larger tumors, or tumors with aggressive histologic features, in these anatomic locations.

## 2021-01-22 NOTE — PROGRESS NOTE ADULT - PROBLEM SELECTOR PLAN 1
- increase Bumex to 3 mg IV BID to target 2-3 lbs weight loss then transition to oral diuretics   - continue milrinone at 0.25 mcg/kg/min, with hopes to wean once fluid balance is optimized  - continue with HDZN 25 mg q8h  - continue with Toprol  mg daily; goal HR 60-70  - daily standing weight and strict I&Os

## 2021-01-22 NOTE — PROGRESS NOTE ADULT - ASSESSMENT
Assessment  DMT2: 75y Female with DM T2 with hyperglycemia, A1C 7.1%, was on insulin at home, now on basal bolus insulin, BID Lantus dose adjusted by primary team, blood sugars fluctuating/running high and not at target, no hypoglycemic episodes, eating partial meals, appears comfortable.  HF: on medications, stable, monitored, FU Cardiology.  Hypothyroidism: On Synthroid 50 mcg po daily, compliant with Synthroid intake, asymptomatic, euthyroid.  CKD: Monitor labs/BMP      Jillian Banks MD  Cell: [phone]  Office: [phone]       Assessment  DMT2: 75y Female with DM T2 with hyperglycemia, A1C 7.1%, was on insulin at home, now on basal bolus insulin, BID Lantus dose  adjusted by primary team, blood sugars fluctuating/running high and not at target, no hypoglycemic episodes, eating partial meals, appears comfortable.  HF: on medications, stable, monitored, FU Cardiology.  Hypothyroidism: On Synthroid 50 mcg po daily, compliant with Synthroid intake, asymptomatic, euthyroid.  CKD: Monitor labs/BMP      Jillian Banks MD  Cell: [phone]  Office: [phone]

## 2021-01-22 NOTE — PROGRESS NOTE ADULT - SUBJECTIVE AND OBJECTIVE BOX
S: Resting comfortably on room air, reports SOB improved. Denies chest pain. Review of systems otherwise (-)    Review of Systems:   Constitutional: [ ] fevers, [ ] chills.   Skin: [ ] dry skin. [ ] rashes.  Psychiatric: [ ] depression, [ ] anxiety.   Gastrointestinal: [ ] BRBPR, [ ] melena.   Neurological: [ ] confusion. [ ] seizures. [ ] shuffling gait.   Ears,Nose,Mouth and Throat: [ ] ear pain [ ] sore throat.   Eyes: [ ] diplopia.   Respiratory: [ ] hemoptysis. [ ] shortness of breath  Cardiovascular: See HPI above  Hematologic/Lymphatic: [ ] anemia. [ ] painful nodes. [ ] prolonged bleeding.   Genitourinary: [ ] hematuria. [ ] flank pain.   Endocrine: [ ] significant change in weight. [ ] intolerance to heat and cold.     Review of systems [ x] otherwise negative, [ ] otherwise unable to obtain    FH: no family history of sudden cardiac death in first degree relatives    SH: [ ] tobacco, [ ] alcohol, [ ] drugs      MEDICATIONS  (STANDING):  aspirin enteric coated 81 milliGRAM(s) Oral daily  atorvastatin 80 milliGRAM(s) Oral at bedtime  buMETAnide Injectable 2 milliGRAM(s) IV Push two times a day  calamine/zinc oxide Lotion 1 Application(s) Topical three times a day  clobetasol 0.05% Cream 1 Application(s) Topical every 12 hours  clopidogrel Tablet 75 milliGRAM(s) Oral daily  dextrose 40% Gel 15 Gram(s) Oral once  dextrose 5%. 1000 milliLiter(s) (50 mL/Hr) IV Continuous <Continuous>  dextrose 5%. 1000 milliLiter(s) (100 mL/Hr) IV Continuous <Continuous>  dextrose 50% Injectable 25 Gram(s) IV Push once  dextrose 50% Injectable 12.5 Gram(s) IV Push once  dextrose 50% Injectable 25 Gram(s) IV Push once  glucagon  Injectable 1 milliGRAM(s) IntraMuscular once  heparin   Injectable 5000 Unit(s) SubCutaneous every 8 hours  hydrALAZINE 25 milliGRAM(s) Oral every 8 hours  insulin glargine Injectable (LANTUS) 54 Unit(s) SubCutaneous at bedtime  insulin glargine Injectable (LANTUS) 14 Unit(s) SubCutaneous once  insulin lispro (ADMELOG) corrective regimen sliding scale   SubCutaneous three times a day before meals  insulin lispro (ADMELOG) corrective regimen sliding scale   SubCutaneous at bedtime  insulin lispro Injectable (ADMELOG) 35 Unit(s) SubCutaneous three times a day before meals  levothyroxine Injectable 25 MICROGram(s) IV Push at bedtime  melatonin 3 milliGRAM(s) Oral at bedtime  metoprolol succinate  milliGRAM(s) Oral daily  milrinone Infusion 0.25 MICROgram(s)/kG/Min (3.74 mL/Hr) IV Continuous <Continuous>  senna 2 Tablet(s) Oral at bedtime    MEDICATIONS  (PRN):  polyethylene glycol 3350 17 Gram(s) Oral two times a day PRN Constipation      LABS:                          10.3   12.07 )-----------( 448      ( 2021 05:35 )             33.0     Hemoglobin: 10.3 g/dL ( @ 05:35)  Hemoglobin: 10.1 g/dL ( @ 05:59)  Hemoglobin: 10.7 g/dL ( @ 06:01)  Hemoglobin: 10.8 g/dL ( @ 06:34)        135  |  97  |  117<H>  ----------------------------<  255<H>  4.0   |  19<L>  |  2.15<H>    Ca    9.7      2021 05:35  Phos  4.4       Mg     2.6           Creatinine Trend: 2.15<--, 2.10<--, 2.31<--, 2.47<--, 2.54<--, 2.39<--             21 @ 07:01  -  21 @ 07:00  --------------------------------------------------------  IN: 580 mL / OUT: 900 mL / NET: -320 mL        PHYSICAL EXAM  Vital Signs Last 24 Hrs  T(C): 36.7 (2021 11:11), Max: 36.7 (2021 11:11)  T(F): 98 (2021 11:11), Max: 98 (2021 11:11)  HR: 88 (2021 11:11) (76 - 90)  BP: 98/59 (2021 11:11) (93/53 - 130/71)  BP(mean): --  RR: 19 (2021 11:11) (17 - 19)  SpO2: 98% (2021 11:11) (96% - 100%)      General: Well nourished in no acute distress. Alert and Oriented * 3.   Head: Normocephalic and atraumatic.   Neck: No JVD. No bruits. Supple. Does not appear to be enlarged.   Cardiovascular: + S1,S2 ; RRR Soft systolic murmur at the left lower sternal border. No rubs noted.    Lungs: CTA b/l. No rhonchi, rales or wheezes.   Abdomen: + BS, soft. Non tender. Non distended. No rebound. No guarding.   Extremities: No clubbing/cyanosis/edema.   Neurologic: Moves all four extremities. Full range of motion.   Skin: Warm and moist. The patient's skin has normal elasticity and good skin turgor.   Psychiatric: Appropriate mood and affect.  Musculoskeletal: Normal range of motion, normal strength    TELEMETRY: SR 80-90s, brief PAT    < from: Cardiac Cath Lab - Adult (21 @ 10:22) >  PROCEDURE:  --  Right heart catheterization.  --  Sonosite - Diagnostic.  COMPLICATIONS: There were no complications.  DIAGNOSTIC RECOMMENDATIONS: The patient should continuewith the present  medications.  Prepared and signed by  Cesar Jackson M.D.  Signed 2021 12:47:04  HEMODYNAMIC TABLES  Pressures:  Baseline  Pressures:  - HR: 71  Pressures:  - Rhythm:  Pressures:  -- Pulmonary Artery (S/D/M): 50/21/32  Pressures:  -- Pulmonary Capillary Wedge: /  Pressures:  -- Right Atrium (a/v/M): //15  Pressures:  -- Right Ventricle (s/edp): 49/10/--  O2 Sats:  Baseline  O2 Sats:  - HR: 71  O2 Sats:  - Rhythm:  O2 Sats:  -- AO: 9.9/98/13.19  O2 Sats:  -- PA: 9.9/55.4/7.46  Outputs:  Baseline  Outputs:  -- CALCUL[age]  Outputs:  -- CALCULATIONS: Body Surface Area: 1.43  Outputs:  -- CALCULATIONS: Height in cm: 150.00  Outputs:  -- CALCULATIONS: Sex: Female  Outputs:  -- CALCULATIONS: Weight in k.90  Outputs:  -- OUTPUTS: CO by Chapin: 3.49  Outputs:  -- OUTPUTS: Chapin cardiac index: 2.44  Outputs:  -- OUTPUTS: O2 consumption: 200.00  Outputs:  -- OUTPUTS: Vo2 Indexed: 139.73  Outputs:  -- RESISTANCES: Pulmonary vascular index (dsc): 426.78  Outputs:  -- RESISTANCES: Pulmonary vascular index (Wood Units): 5.34  Outputs:  -- RESISTANCES: Pulmonary vascular resistance (dsc): 298.18  Outputs:  -- RESISTANCES: Pulmonary vascular resistance (Wood Units): 3.73  Outputs:  -- RESISTANCES: Right ventricular stroke work: 10.61  Outputs:  -- RESISTANCES: Right ventricular stroke work index: 7.41  Outputs:  -- RESISTANCES: Total pulmonary index (dsc): 1050.53  Outputs:  -- RESISTANCES: Total pulmonary index (Wood Units): 13.13  Outputs:  -- RESISTANCES: Total pulmonary resistance (dsc): 733.98  Outputs:  -- RESISTANCES: Total pulmonary resistance (Wood Units): 9.18  Outputs:  -- SHUNTS: Pulmonary flow: 3.49  Outputs:  -- SHUNTS: Qp Indexed: 2.44  Outputs:  -- SHUNTS: Qs Indexed: 2.44  Outputs:  -- SHUNTS: Systemic flow: 3.49    < end of copied text >      A/P) 76 y/o female PMH HTN, HLD, DM, CAD s/p CABG  and prior PCI, severe mitral regurgitation (s/p mitral clip ), pulmonary HTN, HF (EF 21 % 2019), CKD IV not on dialysis (b/l SCr 2-2.2), hypothyroidism a/w acute on chronic systolic CHF with NYHA IV functional status.    -repeat echo noted but didn't reassess degree of mitral stenosis  -RHC noted above - Filling pressures are appropriate considering LV function and the mitral valve gradient  -would transition to PO bumex  -cardiac Index 2.4 - would d/C milrinone as it may increase HR and subsequently increase MV gradient  -continue Toprol XL  -very poor candidate for a primary prevention ICD  -will discuss the above with HF team  -f/u with Dr. Sotelo on  at 12 PM    Amauri Hill PA-C  Pager: [phone]

## 2021-01-22 NOTE — PROGRESS NOTE ADULT - ATTENDING COMMENTS
75F w/ PMHx of severe ICM (EF 20%), severe MR s/p mitraclip, HTN, HLD, DM2, CKD b/l CR~2 p/w sob due to acute on chronic chf. Now tolerating RA, but based on RHC started on milrinone assisted diuresis.     #acute on chronic HFrEF  -ECG NSR, pvcs, RA deviation, diffuse TWIs and ST deppressions in precordial leads  -suspect trop is demand in setting of CHF excacerbation  -she is warm at this timeokay for bb, hydral. Hold for sbp < 90  -bumex 3 tid  -milrinone per CHF  -tele  -cards consult appreciated  -trend LFTs daily  -strict is/os and daily standing weights    #DM2  -titrate insulin prn - lantus bid  -endo following    #rash  -diffuse erythematous, raised pruritic papules  ?severe eczema  -start steroid cream, hydroxycine prn, permetherin per derm  -improving    #decreased LLE pulses - vasc doppler negative     improving, likely approaching euvolemia  check UA for leukocytosis    Tor Newberry MD  Division of Hospital Medicine  Pager: [phone]  Office: [phone]

## 2021-01-22 NOTE — PROGRESS NOTE ADULT - SUBJECTIVE AND OBJECTIVE BOX
Chief complaint  Patient is a 75y old  Female who presents with a chief complaint of increased work of breathing (22 Jan 2021 12:05)   Review of systems  Patient awake in chair, looks comfortable, no hypoglycemic episodes.    Labs and Fingersticks  CAPILLARY BLOOD GLUCOSE      POCT Blood Glucose.: 306 mg/dL (22 Jan 2021 11:56)  POCT Blood Glucose.: 266 mg/dL (22 Jan 2021 08:22)  POCT Blood Glucose.: 268 mg/dL (22 Jan 2021 07:17)  POCT Blood Glucose.: 268 mg/dL (21 Jan 2021 21:25)  POCT Blood Glucose.: 246 mg/dL (21 Jan 2021 16:22)      Anion Gap, Serum: 19 <H> (01-22 @ 05:35)  Anion Gap, Serum: 18 <H> (01-21 @ 05:59)      Calcium, Total Serum: 9.7 (01-22 @ 05:35)  Calcium, Total Serum: 10.0 (01-21 @ 05:59)          01-22    135  |  97  |  117<H>  ----------------------------<  255<H>  4.0   |  19<L>  |  2.15<H>    Ca    9.7      22 Jan 2021 05:35  Phos  4.4     01-22  Mg     2.6     01-22                          10.3   12.07 )-----------( 448      ( 22 Jan 2021 05:35 )             33.0     Medications  MEDICATIONS  (STANDING):  aspirin enteric coated 81 milliGRAM(s) Oral daily  atorvastatin 80 milliGRAM(s) Oral at bedtime  buMETAnide Injectable 3 milliGRAM(s) IV Push every 8 hours  calamine/zinc oxide Lotion 1 Application(s) Topical three times a day  clobetasol 0.05% Cream 1 Application(s) Topical every 12 hours  clopidogrel Tablet 75 milliGRAM(s) Oral daily  dextrose 40% Gel 15 Gram(s) Oral once  dextrose 5%. 1000 milliLiter(s) (50 mL/Hr) IV Continuous <Continuous>  dextrose 5%. 1000 milliLiter(s) (100 mL/Hr) IV Continuous <Continuous>  dextrose 50% Injectable 25 Gram(s) IV Push once  dextrose 50% Injectable 12.5 Gram(s) IV Push once  dextrose 50% Injectable 25 Gram(s) IV Push once  glucagon  Injectable 1 milliGRAM(s) IntraMuscular once  heparin   Injectable 5000 Unit(s) SubCutaneous every 8 hours  hydrALAZINE 25 milliGRAM(s) Oral every 8 hours  insulin glargine Injectable (LANTUS) 54 Unit(s) SubCutaneous at bedtime  insulin lispro (ADMELOG) corrective regimen sliding scale   SubCutaneous three times a day before meals  insulin lispro (ADMELOG) corrective regimen sliding scale   SubCutaneous at bedtime  insulin lispro Injectable (ADMELOG) 35 Unit(s) SubCutaneous three times a day before meals  levothyroxine Injectable 25 MICROGram(s) IV Push at bedtime  melatonin 3 milliGRAM(s) Oral at bedtime  metoprolol succinate  milliGRAM(s) Oral daily  milrinone Infusion 0.25 MICROgram(s)/kG/Min (3.74 mL/Hr) IV Continuous <Continuous>  senna 2 Tablet(s) Oral at bedtime      Physical Exam  General: Patient comfortable in bed  Vital Signs Last 12 Hrs  T(F): 98 (01-22-21 @ 11:11), Max: 98 (01-22-21 @ 11:11)  HR: 88 (01-22-21 @ 11:11) (86 - 90)  BP: 98/59 (01-22-21 @ 11:11) (93/53 - 109/67)  BP(mean): --  RR: 19 (01-22-21 @ 11:11) (17 - 19)  SpO2: 98% (01-22-21 @ 11:11) (96% - 98%)  Neck: No palpable thyroid nodules.  CVS: S1S2, No murmurs  Respiratory: No wheezing, no crepitations  GI: Abdomen soft, bowel sounds positive  Musculoskeletal:  edema lower extremities.   Skin: No skin rashes, no ecchymosis    Diagnostics             Chief complaint  Patient is a 75y old  Female who presents with a chief complaint of increased work of breathing (22 Jan 2021 12:05)   Review of systems  Patient awake in chair, looks comfortable, no hypoglycemic episodes.    Labs and Fingersticks  CAPILLARY BLOOD GLUCOSE      POCT Blood Glucose.: 306 mg/dL (22 Jan 2021 11:56)  POCT Blood Glucose.: 266 mg/dL (22 Jan 2021 08:22)  POCT Blood Glucose.: 268 mg/dL (22 Jan 2021 07:17)  POCT Blood Glucose.: 268 mg/dL (21 Jan 2021 21:25)  POCT Blood Glucose.: 246 mg/dL (21 Jan 2021 16:22)      Anion Gap, Serum: 19 <H> (01-22 @ 05:35)  Anion Gap, Serum: 18 <H> (01-21 @ 05:59)      Calcium, Total Serum: 9.7 (01-22 @ 05:35)  Calcium, Total Serum: 10.0 (01-21 @ 05:59)          01-22    135  |  97  |  117<H>  ----------------------------<  255<H>  4.0   |  19<L>  |  2.15<H>    Ca    9.7      22 Jan 2021 05:35  Phos  4.4     01-22  Mg     2.6     01-22                          10.3   12.07 )-----------( 448      ( 22 Jan 2021 05:35 )             33.0     Medications  MEDICATIONS  (STANDING):  aspirin enteric coated 81 milliGRAM(s) Oral daily  atorvastatin 80 milliGRAM(s) Oral at bedtime  buMETAnide Injectable 3 milliGRAM(s) IV Push every 8 hours  calamine/zinc oxide Lotion 1 Application(s) Topical three times a day  clobetasol 0.05% Cream 1 Application(s) Topical every 12 hours  clopidogrel Tablet 75 milliGRAM(s) Oral daily  dextrose 40% Gel 15 Gram(s) Oral once  dextrose 5%. 1000 milliLiter(s) (50 mL/Hr) IV Continuous <Continuous>  dextrose 5%. 1000 milliLiter(s) (100 mL/Hr) IV Continuous <Continuous>  dextrose 50% Injectable 25 Gram(s) IV Push once  dextrose 50% Injectable 12.5 Gram(s) IV Push once  dextrose 50% Injectable 25 Gram(s) IV Push once  glucagon  Injectable 1 milliGRAM(s) IntraMuscular once  heparin   Injectable 5000 Unit(s) SubCutaneous every 8 hours  hydrALAZINE 25 milliGRAM(s) Oral every 8 hours  insulin glargine Injectable (LANTUS) 54 Unit(s) SubCutaneous at bedtime  insulin lispro (ADMELOG) corrective regimen sliding scale   SubCutaneous three times a day before meals  insulin lispro (ADMELOG) corrective regimen sliding scale   SubCutaneous at bedtime  insulin lispro Injectable (ADMELOG) 35 Unit(s) SubCutaneous three times a day before meals  levothyroxine Injectable 25 MICROGram(s) IV Push at bedtime  melatonin 3 milliGRAM(s) Oral at bedtime  metoprolol succinate  milliGRAM(s) Oral daily  milrinone Infusion 0.25 MICROgram(s)/kG/Min (3.74 mL/Hr) IV Continuous <Continuous>  senna 2 Tablet(s) Oral at bedtime      Physical Exam  General: Patient comfortable in bed  Vital Signs Last 12 Hrs  T(F): 98 (01-22-21 @ 11:11), Max: 98 (01-22-21 @ 11:11)  HR: 88 (01-22-21 @ 11:11) (86 - 90)  BP: 98/59 (01-22-21 @ 11:11) (93/53 - 109/67)  BP(mean): --  RR: 19 (01-22-21 @ 11:11) (17 - 19)  SpO2: 98% (01-22-21 @ 11:11) (96% - 98%)  Neck: No palpable thyroid nodules.  CVS: S1S2, No murmurs  Respiratory: No wheezing, no crepitations  GI: Abdomen soft, bowel sounds positive  Musculoskeletal:  edema lower extremities.   Skin: No skin rashes, no ecchymosis    Diagnostics

## 2021-01-22 NOTE — PROGRESS NOTE ADULT - SUBJECTIVE AND OBJECTIVE BOX
Jacquie Amos MD  Internal Medicine, PGY-3  [phone]    Patient is a 75y old  Female who presents with a chief complaint of increased work of breathing (21 Jan 2021 15:24)      SUBJECTIVE / OVERNIGHT EVENTS:      REVIEW OF SYSTEMS:    CONSTITUTIONAL: No weakness, fevers or chills  EYES: No visual changes; No blurry vision  ENT:  No pain or stiffness; No vertigo or throat pain  RESPIRATORY: No cough, wheezing, hemoptysis; No shortness of breath  CARDIOVASCULAR: No chest pain or palpitations  GASTROINTESTINAL: No abdominal or epigastric pain. No nausea, vomiting, or hematemesis; No diarrhea or constipation. No melena or hematochezia.  GENITOURINARY: No dysuria, frequency or hematuria  NEUROLOGICAL: No numbness, No HA  SKIN: No itching, rashes  MSK: no joint pain, no muscle pain  MEDICATIONS  (STANDING):  aspirin enteric coated 81 milliGRAM(s) Oral daily  atorvastatin 80 milliGRAM(s) Oral at bedtime  buMETAnide Injectable 2 milliGRAM(s) IV Push two times a day  calamine/zinc oxide Lotion 1 Application(s) Topical three times a day  clobetasol 0.05% Cream 1 Application(s) Topical every 12 hours  clopidogrel Tablet 75 milliGRAM(s) Oral daily  dextrose 40% Gel 15 Gram(s) Oral once  dextrose 5%. 1000 milliLiter(s) (50 mL/Hr) IV Continuous <Continuous>  dextrose 5%. 1000 milliLiter(s) (100 mL/Hr) IV Continuous <Continuous>  dextrose 50% Injectable 25 Gram(s) IV Push once  dextrose 50% Injectable 12.5 Gram(s) IV Push once  dextrose 50% Injectable 25 Gram(s) IV Push once  glucagon  Injectable 1 milliGRAM(s) IntraMuscular once  heparin   Injectable 5000 Unit(s) SubCutaneous every 8 hours  hydrALAZINE 25 milliGRAM(s) Oral every 8 hours  insulin glargine Injectable (LANTUS) 54 Unit(s) SubCutaneous at bedtime  insulin glargine Injectable (LANTUS) 14 Unit(s) SubCutaneous once  insulin lispro (ADMELOG) corrective regimen sliding scale   SubCutaneous three times a day before meals  insulin lispro (ADMELOG) corrective regimen sliding scale   SubCutaneous at bedtime  insulin lispro Injectable (ADMELOG) 35 Unit(s) SubCutaneous three times a day before meals  levothyroxine Injectable 25 MICROGram(s) IV Push at bedtime  melatonin 3 milliGRAM(s) Oral at bedtime  metoprolol succinate  milliGRAM(s) Oral daily  milrinone Infusion 0.25 MICROgram(s)/kG/Min (3.74 mL/Hr) IV Continuous <Continuous>  senna 2 Tablet(s) Oral at bedtime    MEDICATIONS  (PRN):  polyethylene glycol 3350 17 Gram(s) Oral two times a day PRN Constipation      CAPILLARY BLOOD GLUCOSE      POCT Blood Glucose.: 268 mg/dL (22 Jan 2021 07:17)  POCT Blood Glucose.: 268 mg/dL (21 Jan 2021 21:25)  POCT Blood Glucose.: 246 mg/dL (21 Jan 2021 16:22)  POCT Blood Glucose.: 424 mg/dL (21 Jan 2021 11:22)  POCT Blood Glucose.: 416 mg/dL (21 Jan 2021 11:20)    I&O's Summary    21 Jan 2021 07:01  -  22 Jan 2021 07:00  --------------------------------------------------------  IN: 580 mL / OUT: 900 mL / NET: -320 mL        PHYSICAL EXAM:  Vital Signs Last 24 Hrs  T(C): 36.6 (22 Jan 2021 04:12), Max: 36.6 (21 Jan 2021 20:00)  T(F): 97.8 (22 Jan 2021 04:12), Max: 97.8 (21 Jan 2021 20:00)  HR: 90 (22 Jan 2021 06:10) (76 - 90)  BP: 109/67 (22 Jan 2021 06:10) (93/53 - 130/71)  BP(mean): --  RR: 17 (22 Jan 2021 04:12) (17 - 19)  SpO2: 96% (22 Jan 2021 04:12) (96% - 100%)    PHYSICAL EXAM:  GENERAL: NAD, well-groomed, well-developed  HEAD:  Atraumatic, Normocephalic  EYES: EOMI, PERRLA, conjunctiva and sclera clear  ENMT: No tonsillar erythema, exudates, or enlargement; Moist mucous membranes, Good dentition, No lesions  NECK: Supple, No JVD, Normal thyroid  CHEST/LUNG: Clear to ausculation bilaterally; No rales, rhonchi, wheezing, or rubs  HEART: Regular rate and rhythm; No murmurs, rubs, or gallops  ABDOMEN: Soft, Nontender, Nondistended; Bowel sounds present  EXTREMITIES:  2+ Peripheral Pulses, No clubbing, cyanosis, or edema  LYMPH: No lymphadenopathy noted  SKIN: No rashes or lesions  NERVOUS SYSTEM:  Alert & Oriented X3, Good concentration; Motor Strength 5/5 B/L upper and lower extremities; DTRs 2+ intact and symmetric      LABS:                        10.3   12.07 )-----------( 448      ( 22 Jan 2021 05:35 )             33.0     01-22    135  |  97  |  117<H>  ----------------------------<  255<H>  4.0   |  19<L>  |  2.15<H>    Ca    9.7      22 Jan 2021 05:35  Phos  4.4     01-22  Mg     2.6     01-22                Procalcitonin, Serum: 0.14 (01-12)    C-Reactive Protein, Serum: 0.63 (01-12)    Ferritin, Serum: 111 (01-13)      D-Dimer Assay, Quantitative: 433 (01-12)        RADIOLOGY & ADDITIONAL TESTS:  Results Reviewed:   Imaging Personally Reviewed:  Electrocardiogram Personally Reviewed:    COORDINATION OF CARE:  Care Discussed with Consultants/Other Providers [Y/N]:  Prior or Outpatient Records Reviewed [Y/N]:   Jacquie Amos MD  Internal Medicine, PGY-3  [phone]    Patient is a 75y old  Female who presents with a chief complaint of increased work of breathing (21 Jan 2021 15:24)      SUBJECTIVE / OVERNIGHT EVENTS: no acute events overnight.  Pt continues to have shortness of breath while being on room air.  Pt was bladder scanned and pt was not retaining.       REVIEW OF SYSTEMS:    CONSTITUTIONAL: No fevers or chills  RESPIRATORY: No cough, wheezing, hemoptysis  CARDIOVASCULAR:  + stable chest pressure   GASTROINTESTINAL: No abdominal or epigastric pain. No nausea, vomiting, or hematemesis; No diarrhea or constipation. No melena or hematochezia.  GENITOURINARY: No dysuria, frequency or hematuria  SKIN: No itching, rashes  MSK: no joint pain, no muscle pain    MEDICATIONS  (STANDING):  aspirin enteric coated 81 milliGRAM(s) Oral daily  atorvastatin 80 milliGRAM(s) Oral at bedtime  buMETAnide Injectable 2 milliGRAM(s) IV Push two times a day  calamine/zinc oxide Lotion 1 Application(s) Topical three times a day  clobetasol 0.05% Cream 1 Application(s) Topical every 12 hours  clopidogrel Tablet 75 milliGRAM(s) Oral daily  dextrose 40% Gel 15 Gram(s) Oral once  dextrose 5%. 1000 milliLiter(s) (50 mL/Hr) IV Continuous <Continuous>  dextrose 5%. 1000 milliLiter(s) (100 mL/Hr) IV Continuous <Continuous>  dextrose 50% Injectable 25 Gram(s) IV Push once  dextrose 50% Injectable 12.5 Gram(s) IV Push once  dextrose 50% Injectable 25 Gram(s) IV Push once  glucagon  Injectable 1 milliGRAM(s) IntraMuscular once  heparin   Injectable 5000 Unit(s) SubCutaneous every 8 hours  hydrALAZINE 25 milliGRAM(s) Oral every 8 hours  insulin glargine Injectable (LANTUS) 54 Unit(s) SubCutaneous at bedtime  insulin glargine Injectable (LANTUS) 14 Unit(s) SubCutaneous once  insulin lispro (ADMELOG) corrective regimen sliding scale   SubCutaneous three times a day before meals  insulin lispro (ADMELOG) corrective regimen sliding scale   SubCutaneous at bedtime  insulin lispro Injectable (ADMELOG) 35 Unit(s) SubCutaneous three times a day before meals  levothyroxine Injectable 25 MICROGram(s) IV Push at bedtime  melatonin 3 milliGRAM(s) Oral at bedtime  metoprolol succinate  milliGRAM(s) Oral daily  milrinone Infusion 0.25 MICROgram(s)/kG/Min (3.74 mL/Hr) IV Continuous <Continuous>  senna 2 Tablet(s) Oral at bedtime    MEDICATIONS  (PRN):  polyethylene glycol 3350 17 Gram(s) Oral two times a day PRN Constipation      CAPILLARY BLOOD GLUCOSE      POCT Blood Glucose.: 268 mg/dL (22 Jan 2021 07:17)  POCT Blood Glucose.: 268 mg/dL (21 Jan 2021 21:25)  POCT Blood Glucose.: 246 mg/dL (21 Jan 2021 16:22)  POCT Blood Glucose.: 424 mg/dL (21 Jan 2021 11:22)  POCT Blood Glucose.: 416 mg/dL (21 Jan 2021 11:20)    I&O's Summary    21 Jan 2021 07:01  -  22 Jan 2021 07:00  --------------------------------------------------------  IN: 580 mL / OUT: 900 mL / NET: -320 mL        PHYSICAL EXAM:  Vital Signs Last 24 Hrs  T(C): 36.6 (22 Jan 2021 04:12), Max: 36.6 (21 Jan 2021 20:00)  T(F): 97.8 (22 Jan 2021 04:12), Max: 97.8 (21 Jan 2021 20:00)  HR: 90 (22 Jan 2021 06:10) (76 - 90)  BP: 109/67 (22 Jan 2021 06:10) (93/53 - 130/71)  BP(mean): --  RR: 17 (22 Jan 2021 04:12) (17 - 19)  SpO2: 96% (22 Jan 2021 04:12) (96% - 100%)    PHYSICAL EXAM:  GENERAL: NAD, cachectic   HEAD:  Atraumatic, Normocephalic  EYES: EOMI, PERRLA, conjunctiva and sclera clear  CHEST/LUNG: increased bibasilar crackles, no wheeze  HEART: Regular rate and rhythm; No murmur  ABDOMEN: Soft, Nontender, Nondistended; Bowel sounds present  EXTREMITIES: no edema  NERVOUS SYSTEM:  Alert & Oriented X3, no focal deficits.        LABS:                        10.3   12.07 )-----------( 448      ( 22 Jan 2021 05:35 )             33.0     01-22    135  |  97  |  117<H>  ----------------------------<  255<H>  4.0   |  19<L>  |  2.15<H>    Ca    9.7      22 Jan 2021 05:35  Phos  4.4     01-22  Mg     2.6     01-22                Procalcitonin, Serum: 0.14 (01-12)    C-Reactive Protein, Serum: 0.63 (01-12)    Ferritin, Serum: 111 (01-13)      D-Dimer Assay, Quantitative: 433 (01-12)

## 2021-01-22 NOTE — PROGRESS NOTE ADULT - PROBLEM SELECTOR PLAN 2
-TTE 1/13 showed EF of 20% with global LV and RV dysfunction w/ MS  - Heart failure and cardiology following  - stopped bumex 1/18 for euvolemic status and increasing Cr   - s/p Lopressor for MS; was titrating dose to achieve HR 60-70.  Lopressor D/C'd 1/20 after RHC 1/20 showed elevated filling pressures.  Pt now on milrinone drip and Bumex restarted  - per HF not dobutamine indicated at this time -TTE 1/13 showed EF of 20% with global LV and RV dysfunction w/ MS  - Heart failure and cardiology following  - stopped bumex 1/18 for euvolemic status and increasing Cr   - s/p Lopressor for MS; was titrating dose to achieve HR 60-70.  Lopressor D/C'd 1/20 after RHC 1/20 showed elevated filling pressures.  Pt now on milrinone drip and Bumex restarted  - per HF not dobutamine indicated at this time  - repeat TTE showed severely decreased LV fx and grossly decreased RV fx w/ EF 25-30%

## 2021-01-22 NOTE — PROGRESS NOTE ADULT - PROBLEM SELECTOR PLAN 1
Will continue current insulin regimen for now. Will continue monitoring FS and FU.  Patient counseled for compliance with consistent low carb diet and exercise as tolerated outpatient.

## 2021-01-22 NOTE — PROGRESS NOTE ADULT - ATTENDING COMMENTS
Patient seen and examined, agree with above assessment and plan as transcribed above.    - not orthopneic  - Appears euvolemic  - Would favor transitioning to PO Bumex as her LVEDP is likely optimized   - Consider D/C Milrinone as she appears adequately perfused  - CHF f/u noted    Vargas Adame MD, [facility]  BEEPER [phone]

## 2021-01-22 NOTE — PROGRESS NOTE ADULT - PROBLEM SELECTOR PLAN 1
- 2/2 CHF exacerbation.  EF 20% w/ mitral stenosis   - s/p TID 4mg Bumex; stopped 1/18 b/c of increased Cr and euvolemic status   - CXR on admission showed bilateral predominantly lower lobe hazy opacities which is likely 2/2 fluid.  Not presenting w/ clinical signs of PNA and CT chest showed no focal consolidations.  Monitoring of abx.  Blood cx also negative  - s/p Lopressor for MS; was titrating dose to achieve HR 60-70.  D/C'd 1/20 after RHC 1/20 showed elevated filling pressures.  Pt now on milrinone drip and Bumex restarted.  Bladder scan shows no evidence of retention (120cc)

## 2021-01-22 NOTE — PROGRESS NOTE ADULT - ASSESSMENT
Briefly, 74 yo F with HTN, HLD, DM2 (A1c 7.5 10/19), CAD s/p CABG (LIMA to LAD, SVG to OM, SVG to PDA) and prior PCI, ICM HFrEF (EF 20-25%, LVEDD 4.7 cm), severe mitral regurgitation s/p mitral clip (6/19; mean MV gradient 7-8), severe pulmonary HTN, CKD IV (b/l Cr 1.9-2.2), hypothyroidism who BIBEMS 2/2 worsening work of breathing presents in acute on chronic systolic CHF with NYHA IV functional status. Was notably on midodrine as outpatient since discharge from [facility] in 10/19. Has been having increased work of breathing at home with failure to thrive. Initially required Bipap and was given bumex with some improvement. Continues to be dyspneic. Etiology of her symptoms may be secondary to degree of mitral stenosis given HR in 80-90s with mean gradient of 8 on TTE. Also, per nursing staff some of her dyspnea may be anxiety related. She is s/p RHC 1/20 which showed mildly elevated filling pressures with preserved CO and was started on milrinone to assist with diuresis given her fluctuating renal function. Suboptimal response to intermittent IV diuresis. It appears her weight has not improved since time of RHC for which we will escalate dosage. Her SCr is stable and BUN downtrending today.     1/20/20 RHC: RA 15, RV 49/10, PA 50/21 32, PCWP 19, PA 55.4, Ao 98. [place] 3.5/2.5

## 2021-01-22 NOTE — PROGRESS NOTE ADULT - SUBJECTIVE AND OBJECTIVE BOX
[facility] [place] [facility]   MD Dion Dasilva MD, D.O. Ruoru Wong, PA    From 7 AM - 5 PM:  OFFICE: [phone]  Dr. Muhammad cell: [phone]  Dr. Montero cell: [phone]  Dr. Soria cell: [phone]  RASHIDA Smith cell: [phone]    From 5 PM - 7 AM: Answering Service: [phone]  Date of service: 01-22-21 @ 17:03    RENAL FOLLOW UP NOTE  --------------------------------------------------------------------------------  HPI:  Pt seen and examined at bedside.       PAST HISTORY  --------------------------------------------------------------------------------  No significant changes to PMH, PSH, FHx, SHx, unless otherwise noted    ALLERGIES & MEDICATIONS  --------------------------------------------------------------------------------  Allergies    azithromycin (Hives; Pruritus)    Intolerances      Standing Inpatient Medications  aspirin enteric coated 81 milliGRAM(s) Oral daily  atorvastatin 80 milliGRAM(s) Oral at bedtime  buMETAnide IVPB 3 milliGRAM(s) IV Intermittent every 8 hours  calamine/zinc oxide Lotion 1 Application(s) Topical three times a day  clobetasol 0.05% Cream 1 Application(s) Topical every 12 hours  clopidogrel Tablet 75 milliGRAM(s) Oral daily  dextrose 40% Gel 15 Gram(s) Oral once  dextrose 5%. 1000 milliLiter(s) IV Continuous <Continuous>  dextrose 5%. 1000 milliLiter(s) IV Continuous <Continuous>  dextrose 50% Injectable 25 Gram(s) IV Push once  dextrose 50% Injectable 12.5 Gram(s) IV Push once  dextrose 50% Injectable 25 Gram(s) IV Push once  glucagon  Injectable 1 milliGRAM(s) IntraMuscular once  heparin   Injectable 5000 Unit(s) SubCutaneous every 8 hours  hydrALAZINE 25 milliGRAM(s) Oral every 8 hours  insulin glargine Injectable (LANTUS) 54 Unit(s) SubCutaneous at bedtime  insulin lispro (ADMELOG) corrective regimen sliding scale   SubCutaneous three times a day before meals  insulin lispro (ADMELOG) corrective regimen sliding scale   SubCutaneous at bedtime  insulin lispro Injectable (ADMELOG) 39 Unit(s) SubCutaneous three times a day before meals  levothyroxine Injectable 25 MICROGram(s) IV Push at bedtime  melatonin 3 milliGRAM(s) Oral at bedtime  metoprolol succinate  milliGRAM(s) Oral daily  milrinone Infusion 0.25 MICROgram(s)/kG/Min IV Continuous <Continuous>  senna 2 Tablet(s) Oral at bedtime    PRN Inpatient Medications  polyethylene glycol 3350 17 Gram(s) Oral two times a day PRN      REVIEW OF SYSTEMS  --------------------------------------------------------------------------------  General: no fever  CVS: no chest pain  RESP: no sob, no cough  ABD: no abdominal pain  : no dysuria,  MSK: no edema     VITALS/PHYSICAL EXAM  --------------------------------------------------------------------------------  T(C): 36.7 (01-22-21 @ 11:11), Max: 36.7 (01-22-21 @ 11:11)  HR: 85 (01-22-21 @ 15:21) (84 - 90)  BP: 107/57 (01-22-21 @ 15:21) (93/53 - 130/71)  RR: 19 (01-22-21 @ 11:11) (17 - 19)  SpO2: 98% (01-22-21 @ 11:11) (96% - 100%)  Wt(kg): --        01-21-21 @ 07:01  -  01-22-21 @ 07:00  --------------------------------------------------------  IN: 580 mL / OUT: 900 mL / NET: -320 mL    01-22-21 @ 07:01  -  01-22-21 @ 17:03  --------------------------------------------------------  IN: 0 mL / OUT: 300 mL / NET: -300 mL      Physical Exam:  	Gen: NAD  	HEENT: MMM  	Pulm: CTA B/L  	CV: S1S2  	Abd: Soft, +BS  	Ext: No LE edema B/L                      Neuro: Awake   	Skin: Warm and Dry   	    LABS/STUDIES  --------------------------------------------------------------------------------              10.3   12.07 >-----------<  448      [01-22-21 @ 05:35]              33.0     135  |  97  |  117  ----------------------------<  255      [01-22-21 @ 05:35]  4.0   |  19  |  2.15        Ca     9.7     [01-22-21 @ 05:35]      Mg     2.6     [01-22-21 @ 05:35]      Phos  4.4     [01-22-21 @ 05:35]    Creatinine Trend:  SCr 2.15 [01-22 @ 05:35]  SCr 2.10 [01-21 @ 05:59]  SCr 2.31 [01-20 @ 06:00]  SCr 2.47 [01-19 @ 06:01]  SCr 2.54 [01-18 @ 18:03]    Urinalysis - [01-12-21 @ 19:28]      Color Light Yellow / Appearance Clear / SG 1.011 / pH 6.0      Gluc Negative / Ketone Negative  / Bili Negative / Urobili Negative       Blood Negative / Protein 30 mg/dL / Leuk Est Moderate / Nitrite Negative      RBC 1 / WBC 5 / Hyaline 4 / Gran  / Sq Epi  / Non Sq Epi 1 / Bacteria Negative      Ferritin 111      [01-13-21 @ 01:42]  HbA1c 7.5      [10-08-19 @ 14:30]  TSH 2.15      [01-13-21 @ 10:07]

## 2021-01-23 LAB
ANION GAP SERPL CALC-SCNC: 16 MMOL/L — SIGNIFICANT CHANGE UP (ref 5–17)
ANION GAP SERPL CALC-SCNC: 16 MMOL/L — SIGNIFICANT CHANGE UP (ref 5–17)
BUN SERPL-MCNC: 128 MG/DL — HIGH (ref 7–23)
BUN SERPL-MCNC: 130 MG/DL — HIGH (ref 7–23)
CALCIUM SERPL-MCNC: 9.6 MG/DL — SIGNIFICANT CHANGE UP (ref 8.4–10.5)
CALCIUM SERPL-MCNC: 9.7 MG/DL — SIGNIFICANT CHANGE UP (ref 8.4–10.5)
CHLORIDE SERPL-SCNC: 96 MMOL/L — SIGNIFICANT CHANGE UP (ref 96–108)
CHLORIDE SERPL-SCNC: 96 MMOL/L — SIGNIFICANT CHANGE UP (ref 96–108)
CO2 SERPL-SCNC: 21 MMOL/L — LOW (ref 22–31)
CO2 SERPL-SCNC: 22 MMOL/L — SIGNIFICANT CHANGE UP (ref 22–31)
CREAT SERPL-MCNC: 2.31 MG/DL — HIGH (ref 0.5–1.3)
CREAT SERPL-MCNC: 2.4 MG/DL — HIGH (ref 0.5–1.3)
GLUCOSE BLDC GLUCOMTR-MCNC: 184 MG/DL — HIGH (ref 70–99)
GLUCOSE BLDC GLUCOMTR-MCNC: 188 MG/DL — HIGH (ref 70–99)
GLUCOSE BLDC GLUCOMTR-MCNC: 218 MG/DL — HIGH (ref 70–99)
GLUCOSE BLDC GLUCOMTR-MCNC: 220 MG/DL — HIGH (ref 70–99)
GLUCOSE SERPL-MCNC: 156 MG/DL — HIGH (ref 70–99)
GLUCOSE SERPL-MCNC: 230 MG/DL — HIGH (ref 70–99)
HCT VFR BLD CALC: 31.3 % — LOW (ref 34.5–45)
HGB BLD-MCNC: 9.8 G/DL — LOW (ref 11.5–15.5)
MAGNESIUM SERPL-MCNC: 2.6 MG/DL — SIGNIFICANT CHANGE UP (ref 1.6–2.6)
MCHC RBC-ENTMCNC: 26.3 PG — LOW (ref 27–34)
MCHC RBC-ENTMCNC: 31.3 GM/DL — LOW (ref 32–36)
MCV RBC AUTO: 83.9 FL — SIGNIFICANT CHANGE UP (ref 80–100)
NRBC # BLD: 0 /100 WBCS — SIGNIFICANT CHANGE UP (ref 0–0)
PHOSPHATE SERPL-MCNC: 4.8 MG/DL — HIGH (ref 2.5–4.5)
PLATELET # BLD AUTO: 431 K/UL — HIGH (ref 150–400)
POTASSIUM SERPL-MCNC: 3.4 MMOL/L — LOW (ref 3.5–5.3)
POTASSIUM SERPL-MCNC: 4.5 MMOL/L — SIGNIFICANT CHANGE UP (ref 3.5–5.3)
POTASSIUM SERPL-SCNC: 3.4 MMOL/L — LOW (ref 3.5–5.3)
POTASSIUM SERPL-SCNC: 4.5 MMOL/L — SIGNIFICANT CHANGE UP (ref 3.5–5.3)
RBC # BLD: 3.73 M/UL — LOW (ref 3.8–5.2)
RBC # FLD: 16.1 % — HIGH (ref 10.3–14.5)
SODIUM SERPL-SCNC: 133 MMOL/L — LOW (ref 135–145)
SODIUM SERPL-SCNC: 134 MMOL/L — LOW (ref 135–145)
WBC # BLD: 9.76 K/UL — SIGNIFICANT CHANGE UP (ref 3.8–10.5)
WBC # FLD AUTO: 9.76 K/UL — SIGNIFICANT CHANGE UP (ref 3.8–10.5)

## 2021-01-23 PROCEDURE — 99233 SBSQ HOSP IP/OBS HIGH 50: CPT | Mod: GC

## 2021-01-23 RX ORDER — POTASSIUM CHLORIDE 20 MEQ
20 PACKET (EA) ORAL
Refills: 0 | Status: COMPLETED | OUTPATIENT
Start: 2021-01-23 | End: 2021-01-23

## 2021-01-23 RX ORDER — BUMETANIDE 0.25 MG/ML
3 INJECTION INTRAMUSCULAR; INTRAVENOUS
Refills: 0 | Status: DISCONTINUED | OUTPATIENT
Start: 2021-01-24 | End: 2021-01-24

## 2021-01-23 RX ADMIN — SENNA PLUS 2 TABLET(S): 8.6 TABLET ORAL at 21:25

## 2021-01-23 RX ADMIN — Medication 39 UNIT(S): at 11:55

## 2021-01-23 RX ADMIN — Medication 25 MILLIGRAM(S): at 21:25

## 2021-01-23 RX ADMIN — Medication 2: at 16:48

## 2021-01-23 RX ADMIN — Medication 100 MILLIGRAM(S): at 05:41

## 2021-01-23 RX ADMIN — INSULIN GLARGINE 54 UNIT(S): 100 INJECTION, SOLUTION SUBCUTANEOUS at 07:46

## 2021-01-23 RX ADMIN — HEPARIN SODIUM 5000 UNIT(S): 5000 INJECTION INTRAVENOUS; SUBCUTANEOUS at 05:40

## 2021-01-23 RX ADMIN — CALAMINE AND ZINC OXIDE AND PHENOL 1 APPLICATION(S): 160; 10 LOTION TOPICAL at 05:40

## 2021-01-23 RX ADMIN — Medication 20 MILLIEQUIVALENT(S): at 11:12

## 2021-01-23 RX ADMIN — HEPARIN SODIUM 5000 UNIT(S): 5000 INJECTION INTRAVENOUS; SUBCUTANEOUS at 13:41

## 2021-01-23 RX ADMIN — Medication 81 MILLIGRAM(S): at 11:12

## 2021-01-23 RX ADMIN — BUMETANIDE 124 MILLIGRAM(S): 0.25 INJECTION INTRAMUSCULAR; INTRAVENOUS at 05:41

## 2021-01-23 RX ADMIN — HEPARIN SODIUM 5000 UNIT(S): 5000 INJECTION INTRAVENOUS; SUBCUTANEOUS at 21:25

## 2021-01-23 RX ADMIN — CLOPIDOGREL BISULFATE 75 MILLIGRAM(S): 75 TABLET, FILM COATED ORAL at 11:12

## 2021-01-23 RX ADMIN — Medication 1 APPLICATION(S): at 16:50

## 2021-01-23 RX ADMIN — Medication 20 MILLIEQUIVALENT(S): at 07:39

## 2021-01-23 RX ADMIN — Medication 2: at 07:44

## 2021-01-23 RX ADMIN — Medication 4: at 11:56

## 2021-01-23 RX ADMIN — ATORVASTATIN CALCIUM 80 MILLIGRAM(S): 80 TABLET, FILM COATED ORAL at 21:25

## 2021-01-23 RX ADMIN — Medication 25 MICROGRAM(S): at 21:26

## 2021-01-23 RX ADMIN — Medication 1 APPLICATION(S): at 05:40

## 2021-01-23 RX ADMIN — Medication 3 MILLIGRAM(S): at 21:25

## 2021-01-23 RX ADMIN — INSULIN GLARGINE 54 UNIT(S): 100 INJECTION, SOLUTION SUBCUTANEOUS at 21:26

## 2021-01-23 RX ADMIN — BUMETANIDE 124 MILLIGRAM(S): 0.25 INJECTION INTRAMUSCULAR; INTRAVENOUS at 13:41

## 2021-01-23 RX ADMIN — Medication 20 MILLIEQUIVALENT(S): at 09:07

## 2021-01-23 RX ADMIN — Medication 25 MILLIGRAM(S): at 05:41

## 2021-01-23 RX ADMIN — CALAMINE AND ZINC OXIDE AND PHENOL 1 APPLICATION(S): 160; 10 LOTION TOPICAL at 21:20

## 2021-01-23 RX ADMIN — Medication 25 MILLIGRAM(S): at 13:41

## 2021-01-23 RX ADMIN — CALAMINE AND ZINC OXIDE AND PHENOL 1 APPLICATION(S): 160; 10 LOTION TOPICAL at 13:41

## 2021-01-23 RX ADMIN — Medication 39 UNIT(S): at 16:48

## 2021-01-23 RX ADMIN — Medication 39 UNIT(S): at 07:44

## 2021-01-23 NOTE — PROGRESS NOTE ADULT - PROBLEM SELECTOR PLAN 1
Will continue current insulin regimen for now. Will continue monitoring  blood sugars,, log, will Follow up.  Patient counseled for compliance with consistent low carb diet.

## 2021-01-23 NOTE — PROGRESS NOTE ADULT - PROBLEM SELECTOR PLAN 1
- 2/2 CHF exacerbation.  EF 20% w/ mitral stenosis   - s/p TID 4mg Bumex; stopped 1/18 b/c of increased Cr and euvolemic status   - CXR on admission showed bilateral predominantly lower lobe hazy opacities which is likely 2/2 fluid.  Not presenting w/ clinical signs of PNA and CT chest showed no focal consolidations.  Monitoring of abx.  Blood cx also negative  - s/p Lopressor for MS; was titrating dose to achieve HR 60-70.  D/C'd 1/20 after RHC 1/20 showed elevated filling pressures.  Pt now on milrinone drip and Bumex restarted.  Bladder scan shows no evidence of retention (120cc) - 2/2 CHF exacerbation.  EF 20% w/ mitral stenosis   - s/p TID 4mg Bumex; stopped 1/18 b/c of increased Cr and euvolemic status   - CXR on admission showed bilateral predominantly lower lobe hazy opacities which is likely 2/2 fluid.  Not presenting w/ clinical signs of PNA and CT chest showed no focal consolidations.  Monitoring of abx.  Blood cx also negative  - s/p Lopressor for MS; was titrating dose to achieve HR 60-70.  D/C'd 1/20 after RHC 1/20 showed elevated filling pressures.  Pt now on milrinone drip and Bumex restarted.  Bladder scan shows no evidence of retention (120cc)  - BPs remain soft, will f/u HF recs regarding medications

## 2021-01-23 NOTE — PROGRESS NOTE ADULT - ASSESSMENT
75y.o F Romanian speaking h/o HTN, HLD, DM, CAD s/p CABG 2014 and prior PCI, severe mitral regurgitation (s/p mitral clip 2019), pulmonary HTN (TTE 2019), HF (EF 21 % June 2019), CKD IV not on dialysis (b/l SCr 2-2.2), hypothyroidism BIBEMS 2/2 worsening work of breathing, found to have crackles, elevated proBNP and b/l pulmonary edema on CXR c/w acute exacerbation of CHF.

## 2021-01-23 NOTE — PROGRESS NOTE ADULT - ATTENDING COMMENTS
seen and agree w/ PA.  having some atelectatic crackles at the bases that clear with a good cough.  given BUN now >120, would like to see diuretics paused.  has an excellent cardiac index, looking forward to weaning milrinone this week.

## 2021-01-23 NOTE — PROGRESS NOTE ADULT - ATTENDING COMMENTS
Vitals, labs, chart reviewed. Pt's only complaint is generalized fatigue unchanged for many days. No cp/sob/dysuria. no f/c/r. PE; NAD; euvolemic; non-focal; abd benign    Acute on chronic systolic heart failure- will d/w cards about stopping Bumex vs changing to po based on CHF note yest. cont Milrinone per cards Vitals, labs, chart reviewed. Pt's only complaint is generalized fatigue unchanged for many days. No cp/sob/dysuria. no f/c/r. PE; NAD; euvolemic; non-focal; abd benign    Acute on chronic systolic heart failure- will d/w cards about stopping Bumex vs changing to po based on CHF note yest. cont Milrinone per cards    CKD 4- monitor creat afte diuretics change. f/u renal recs    DM 2, uncontrolled- improved fs. apprec Endo recs. monitor for now

## 2021-01-23 NOTE — PROGRESS NOTE ADULT - ASSESSMENT
76 yo F w/ PMH of CKD stage 4 (baseline Cr 1.9-2.2) HTN, T2DM, CAD s/p CABG X3 (2014 at [facility]), non-dilated ICM (EF 20-25%), severe mitral regurgitation s/p mitral clip (6/13/19), severe pulm HTN, hypothyroidism who presented for evaluation of SOB and was admitted for ADHF.    CKD stage 4  - baseline Cr 1.9-2.2; has had recurrent MERVIN's over the years  - CKD is likely 2/2 recurrent MERVIN's + underlying DM/HTN  - Scr slightly increased today. BUN elevated due to diuretics. Diuretics per heart failure team   - Avoid hypotension   - renal sonogram in the past with simple renal cyst  - d/w Cardiology to adjust diuretics in view of worsening BUN  - d/w daughter and son-in-law about worsening renal function and if no improvement likely need for dialysis. They were also informed that she will be a poor candidate for HD in view of CHF. They understand but wants HD if needed. Consent for HD is in chart.  - Avoid nephrotoxins including NSAIDs, IV contrast (if possible), [employer] products  - monitor I/O's accurately    HTN  BP controlled  Low salt diet   MOnitor BP    Proteinuria/Hematuria  - likely from underlying DM  - has 1.8 grams proteinuria  - Hep B, Hep C, HIV RF, C3, C4, p-ANCA, c-ANCA, RPR neg  - weak gamma-migrating protein, weak IgG lambda protein band noted in the past. Pt to follow w/ heme   - DEAN 1:320 positive  - RPR neg, FTA +

## 2021-01-23 NOTE — PROGRESS NOTE ADULT - PROBLEM SELECTOR PLAN 2
-TTE 1/13 showed EF of 20% with global LV and RV dysfunction w/ MS  - Heart failure and cardiology following  - stopped bumex 1/18 for euvolemic status and increasing Cr   - s/p Lopressor for MS; was titrating dose to achieve HR 60-70.  Lopressor D/C'd 1/20 after RHC 1/20 showed elevated filling pressures.  Pt now on milrinone drip and Bumex restarted  - per HF not dobutamine indicated at this time  - repeat TTE showed severely decreased LV fx and grossly decreased RV fx w/ EF 25-30%

## 2021-01-23 NOTE — PROGRESS NOTE ADULT - ASSESSMENT
Assessment  DMT2: 75y Female with DM T2 with hyperglycemia, A1C 7.1%, was on insulin at home, now on basal bolus insulin, BID Lantus dose  adjusted by primary team, blood sugars trending down, no hypoglycemic episodes, eating partial meals, appears comfortable.  HF: on medications, stable, monitored, FU Cardiology.  Hypothyroidism: On Synthroid 50 mcg po daily, compliant with Synthroid intake, asymptomatic, euthyroid.  CKD: Monitor labs/BMP      Jillian Banks MD  Cell: [phone]  Office: [phone]

## 2021-01-23 NOTE — PROGRESS NOTE ADULT - SUBJECTIVE AND OBJECTIVE BOX
Chief complaint  Patient is a 75y old  Female who presents with a chief complaint of increased work of breathing (23 Jan 2021 14:58)   Review of systems  Patient in bed, appears comfortable.    Labs and Fingersticks  CAPILLARY BLOOD GLUCOSE    POCT Blood Glucose.: 188 mg/dL (23 Jan 2021 16:37)  POCT Blood Glucose.: 220 mg/dL (23 Jan 2021 11:39)  POCT Blood Glucose.: 184 mg/dL (23 Jan 2021 07:40)  POCT Blood Glucose.: 308 mg/dL (22 Jan 2021 21:49)      Anion Gap, Serum: 16 (01-23 @ 15:55)  Anion Gap, Serum: 16 (01-23 @ 04:07)  Anion Gap, Serum: 19 <H> (01-22 @ 05:35)      Calcium, Total Serum: 9.6 (01-23 @ 15:55)  Calcium, Total Serum: 9.7 (01-23 @ 04:07)  Calcium, Total Serum: 9.7 (01-22 @ 05:35)          01-23    134<L>  |  96  |  130<H>  ----------------------------<  230<H>  4.5   |  22  |  2.31<H>    Ca    9.6      23 Jan 2021 15:55  Phos  4.8     01-23  Mg     2.6     01-23                          9.8    9.76  )-----------( 431      ( 23 Jan 2021 04:07 )             31.3     Medications  MEDICATIONS  (STANDING):  aspirin enteric coated 81 milliGRAM(s) Oral daily  atorvastatin 80 milliGRAM(s) Oral at bedtime  calamine/zinc oxide Lotion 1 Application(s) Topical three times a day  clobetasol 0.05% Cream 1 Application(s) Topical every 12 hours  clopidogrel Tablet 75 milliGRAM(s) Oral daily  dextrose 40% Gel 15 Gram(s) Oral once  dextrose 5%. 1000 milliLiter(s) (50 mL/Hr) IV Continuous <Continuous>  dextrose 5%. 1000 milliLiter(s) (100 mL/Hr) IV Continuous <Continuous>  dextrose 50% Injectable 25 Gram(s) IV Push once  dextrose 50% Injectable 12.5 Gram(s) IV Push once  dextrose 50% Injectable 25 Gram(s) IV Push once  glucagon  Injectable 1 milliGRAM(s) IntraMuscular once  heparin   Injectable 5000 Unit(s) SubCutaneous every 8 hours  hydrALAZINE 25 milliGRAM(s) Oral every 8 hours  insulin glargine Injectable (LANTUS) 54 Unit(s) SubCutaneous every morning  insulin glargine Injectable (LANTUS) 54 Unit(s) SubCutaneous at bedtime  insulin lispro (ADMELOG) corrective regimen sliding scale   SubCutaneous three times a day before meals  insulin lispro (ADMELOG) corrective regimen sliding scale   SubCutaneous at bedtime  insulin lispro Injectable (ADMELOG) 39 Unit(s) SubCutaneous three times a day before meals  levothyroxine Injectable 25 MICROGram(s) IV Push at bedtime  melatonin 3 milliGRAM(s) Oral at bedtime  metoprolol succinate  milliGRAM(s) Oral daily  milrinone Infusion 0.25 MICROgram(s)/kG/Min (3.74 mL/Hr) IV Continuous <Continuous>  senna 2 Tablet(s) Oral at bedtime      Physical Exam  General: Patient comfortable in bed  Vital Signs Last 12 Hrs  T(F): 97.6 (01-23-21 @ 11:29), Max: 97.6 (01-23-21 @ 11:29)  HR: 79 (01-23-21 @ 13:43) (79 - 80)  BP: 100/62 (01-23-21 @ 13:43) (100/62 - 102/62)  BP(mean): --  RR: 19 (01-23-21 @ 11:29) (19 - 19)  SpO2: 100% (01-23-21 @ 11:29) (100% - 100%)  Neck: No palpable thyroid nodules.  CVS: S1S2, No murmurs  Respiratory: No wheezing, no crepitations  GI: Abdomen soft, bowel sounds positive  Musculoskeletal:  edema lower extremities.     Diagnostics

## 2021-01-23 NOTE — PROGRESS NOTE ADULT - SUBJECTIVE AND OBJECTIVE BOX
S: Resting comfortably on room air, reports SOB improved. Denies chest pain. Review of systems otherwise (-)    Review of Systems:   Constitutional: [ ] fevers, [ ] chills.   Skin: [ ] dry skin. [ ] rashes.  Psychiatric: [ ] depression, [ ] anxiety.   Gastrointestinal: [ ] BRBPR, [ ] melena.   Neurological: [ ] confusion. [ ] seizures. [ ] shuffling gait.   Ears,Nose,Mouth and Throat: [ ] ear pain [ ] sore throat.   Eyes: [ ] diplopia.   Respiratory: [ ] hemoptysis. [ ] shortness of breath  Cardiovascular: See HPI above  Hematologic/Lymphatic: [ ] anemia. [ ] painful nodes. [ ] prolonged bleeding.   Genitourinary: [ ] hematuria. [ ] flank pain.   Endocrine: [ ] significant change in weight. [ ] intolerance to heat and cold.     Review of systems [ x] otherwise negative, [ ] otherwise unable to obtain    FH: no family history of sudden cardiac death in first degree relatives    SH: [ ] tobacco, [ ] alcohol, [ ] drugs         aspirin enteric coated 81 milliGRAM(s) Oral daily  atorvastatin 80 milliGRAM(s) Oral at bedtime  buMETAnide IVPB 3 milliGRAM(s) IV Intermittent every 8 hours  calamine/zinc oxide Lotion 1 Application(s) Topical three times a day  clobetasol 0.05% Cream 1 Application(s) Topical every 12 hours  clopidogrel Tablet 75 milliGRAM(s) Oral daily  dextrose 40% Gel 15 Gram(s) Oral once  dextrose 5%. 1000 milliLiter(s) IV Continuous <Continuous>  dextrose 5%. 1000 milliLiter(s) IV Continuous <Continuous>  dextrose 50% Injectable 25 Gram(s) IV Push once  dextrose 50% Injectable 12.5 Gram(s) IV Push once  dextrose 50% Injectable 25 Gram(s) IV Push once  glucagon  Injectable 1 milliGRAM(s) IntraMuscular once  heparin   Injectable 5000 Unit(s) SubCutaneous every 8 hours  hydrALAZINE 25 milliGRAM(s) Oral every 8 hours  insulin glargine Injectable (LANTUS) 54 Unit(s) SubCutaneous every morning  insulin glargine Injectable (LANTUS) 54 Unit(s) SubCutaneous at bedtime  insulin lispro (ADMELOG) corrective regimen sliding scale   SubCutaneous three times a day before meals  insulin lispro (ADMELOG) corrective regimen sliding scale   SubCutaneous at bedtime  insulin lispro Injectable (ADMELOG) 39 Unit(s) SubCutaneous three times a day before meals  levothyroxine Injectable 25 MICROGram(s) IV Push at bedtime  melatonin 3 milliGRAM(s) Oral at bedtime  metoprolol succinate  milliGRAM(s) Oral daily  milrinone Infusion 0.25 MICROgram(s)/kG/Min IV Continuous <Continuous>  polyethylene glycol 3350 17 Gram(s) Oral two times a day PRN  potassium chloride    Tablet ER 20 milliEquivalent(s) Oral every 2 hours  senna 2 Tablet(s) Oral at bedtime                            9.8    9.76  )-----------( 431      ( 23 Jan 2021 04:07 )             31.3       Hemoglobin: 9.8 g/dL (01-23 @ 04:07)  Hemoglobin: 10.3 g/dL (01-22 @ 05:35)  Hemoglobin: 10.1 g/dL (01-21 @ 05:59)  Hemoglobin: 10.7 g/dL (01-20 @ 06:01)      01-23    133<L>  |  96  |  128<H>  ----------------------------<  156<H>  3.4<L>   |  21<L>  |  2.40<H>    Ca    9.7      23 Jan 2021 04:07  Phos  4.8     01-23  Mg     2.6     01-23      Creatinine Trend: 2.40<--, 2.15<--, 2.10<--, 2.31<--, 2.47<--, 2.54<--    COAGS:           T(C): 36.7 (01-22-21 @ 19:56), Max: 36.7 (01-22-21 @ 11:11)  HR: 80 (01-22-21 @ 23:10) (80 - 96)  BP: 91/56 (01-22-21 @ 23:10) (91/56 - 107/57)  RR: 19 (01-22-21 @ 19:56) (19 - 19)  SpO2: 98% (01-22-21 @ 19:56) (98% - 98%)  Wt(kg): --    I&O's Summary    22 Jan 2021 07:01  -  23 Jan 2021 07:00  --------------------------------------------------------  IN: 240 mL / OUT: 400 mL / NET: -160 mL      General: Well nourished in no acute distress. Alert and Oriented * 3.   Head: Normocephalic and atraumatic.   Neck: No JVD. No bruits. Supple. Does not appear to be enlarged.   Cardiovascular: + S1,S2 ; RRR Soft systolic murmur at the left lower sternal border. No rubs noted.    Lungs: CTA b/l. No rhonchi, rales or wheezes.   Abdomen: + BS, soft. Non tender. Non distended. No rebound. No guarding.   Extremities: No clubbing/cyanosis/edema.   Neurologic: Moves all four extremities. Full range of motion.   Skin: Warm and moist. The patient's skin has normal elasticity and good skin turgor.   Psychiatric: Appropriate mood and affect.  Musculoskeletal: Normal range of motion, normal strength    TELEMETRY: SR 80-90s, brief PAT    < from: Cardiac Cath Lab - Adult (01.20.21 @ 10:22) >  PROCEDURE:  --  Right heart catheterization.  --  Sonosite - Diagnostic.  COMPLICATIONS: There were no complications.  DIAGNOSTIC RECOMMENDATIONS: The patient should continuewith the present  medications.  Prepared and signed by  Cesar Jackson M.D.         A/P) 74 y/o female PMH HTN, HLD, DM, CAD s/p CABG 2014 and prior PCI, severe mitral regurgitation (s/p mitral clip 2019), pulmonary HTN, HF (EF 21 % June 2019), CKD IV not on dialysis (b/l SCr 2-2.2), hypothyroidism a/w acute on chronic systolic CHF with NYHA IV functional status.    -repeat echo noted but didn't reassess degree of mitral stenosis  - dapt / statin   -RHC noted above - Filling pressures are appropriate considering LV function and the mitral valve gradient  -would transition to PO bumex  -cardiac Index 2.4 - would d/C milrinone as it may increase HR and subsequently increase MV gradient  - tolerating BB,   -very poor candidate for a primary prevention ICD  -f/u with Dr. Sotelo on Friday 1/29 at 12 PM

## 2021-01-23 NOTE — PROGRESS NOTE ADULT - SUBJECTIVE AND OBJECTIVE BOX
Dr. Muhammad  Office [phone]  Cell [phone]  [facility] FIELD  Cell [phone]    RENAL PROGRESS NOTE: DATE OF SERVICE 01-23-21 @ 15:00    Patient is a 75y old  Female who presents with a chief complaint of increased work of breathing (23 Jan 2021 07:53)      Patient seen and examined at bedside. No chest pain/sob    VITALS:  T(F): 97.6 (01-23-21 @ 11:29), Max: 98 (01-22-21 @ 19:56)  HR: 79 (01-23-21 @ 13:43)  BP: 100/62 (01-23-21 @ 13:43)  RR: 19 (01-23-21 @ 11:29)  SpO2: 100% (01-23-21 @ 11:29)  Wt(kg): --    01-22 @ 07:01  -  01-23 @ 07:00  --------------------------------------------------------  IN: 240 mL / OUT: 400 mL / NET: -160 mL    01-23 @ 07:01  -  01-23 @ 15:00  --------------------------------------------------------  IN: 386 mL / OUT: 500 mL / NET: -114 mL          PHYSICAL EXAM:  Constitutional: NAD  Neck: No JVD  Respiratory: CTAB, no wheezes, rales or rhonchi  Cardiovascular: S1, S2, RRR  Gastrointestinal: BS+, soft, NT/ND  Extremities: No peripheral edema    Hospital Medications:   MEDICATIONS  (STANDING):  aspirin enteric coated 81 milliGRAM(s) Oral daily  atorvastatin 80 milliGRAM(s) Oral at bedtime  buMETAnide IVPB 3 milliGRAM(s) IV Intermittent every 8 hours  calamine/zinc oxide Lotion 1 Application(s) Topical three times a day  clobetasol 0.05% Cream 1 Application(s) Topical every 12 hours  clopidogrel Tablet 75 milliGRAM(s) Oral daily  dextrose 40% Gel 15 Gram(s) Oral once  dextrose 5%. 1000 milliLiter(s) (50 mL/Hr) IV Continuous <Continuous>  dextrose 5%. 1000 milliLiter(s) (100 mL/Hr) IV Continuous <Continuous>  dextrose 50% Injectable 25 Gram(s) IV Push once  dextrose 50% Injectable 12.5 Gram(s) IV Push once  dextrose 50% Injectable 25 Gram(s) IV Push once  glucagon  Injectable 1 milliGRAM(s) IntraMuscular once  heparin   Injectable 5000 Unit(s) SubCutaneous every 8 hours  hydrALAZINE 25 milliGRAM(s) Oral every 8 hours  insulin glargine Injectable (LANTUS) 54 Unit(s) SubCutaneous every morning  insulin glargine Injectable (LANTUS) 54 Unit(s) SubCutaneous at bedtime  insulin lispro (ADMELOG) corrective regimen sliding scale   SubCutaneous three times a day before meals  insulin lispro (ADMELOG) corrective regimen sliding scale   SubCutaneous at bedtime  insulin lispro Injectable (ADMELOG) 39 Unit(s) SubCutaneous three times a day before meals  levothyroxine Injectable 25 MICROGram(s) IV Push at bedtime  melatonin 3 milliGRAM(s) Oral at bedtime  metoprolol succinate  milliGRAM(s) Oral daily  milrinone Infusion 0.25 MICROgram(s)/kG/Min (3.74 mL/Hr) IV Continuous <Continuous>  senna 2 Tablet(s) Oral at bedtime      LABS:  01-23    133<L>  |  96  |  128<H>  ----------------------------<  156<H>  3.4<L>   |  21<L>  |  2.40<H>    Ca    9.7      23 Jan 2021 04:07  Phos  4.8     01-23  Mg     2.6     01-23      Creatinine Trend: 2.40 <--, 2.15 <--, 2.10 <--, 2.31 <--, 2.47 <--, 2.54 <--, 2.39 <--, 2.22 <--, 2.07 <--, 2.07 <--    Phosphorus Level, Serum: 4.8 mg/dL (01-23 @ 04:07)                              9.8    9.76  )-----------( 431      ( 23 Jan 2021 04:07 )             31.3     Urine Studies:  Urinalysis - [01-22-21 @ 22:32]      Color Light Yellow / Appearance Clear / SG 1.011 / pH 5.5      Gluc Trace / Ketone Negative  / Bili Negative / Urobili Negative       Blood Negative / Protein 30 mg/dL / Leuk Est Large / Nitrite Negative      RBC 2 / WBC 35 / Hyaline 1 / Gran  / Sq Epi  / Non Sq Epi 2 / Bacteria Negative      Ferritin 111      [01-13-21 @ 01:42]  HbA1c 7.5      [10-08-19 @ 14:30]  TSH 2.15      [01-13-21 @ 10:07]        RADIOLOGY & ADDITIONAL STUDIES:

## 2021-01-23 NOTE — PROGRESS NOTE ADULT - SUBJECTIVE AND OBJECTIVE BOX
Author:   Eugenie Joy MD  Internal Medicine, PGY2  [phone]    Patient:  SELVIN CLAIRE  [id number]    Progress Note    Interval events: No acute events.  Pertinent ROS (if any):      Administered:  insulin glargine Injectable (LANTUS): 54 Unit(s) SubCutaneous ( @ 07:46)  insulin lispro Injectable (ADMELOG): 39 Unit(s) SubCutaneous (:44)  insulin lispro (ADMELOG) corrective regimen sliding scale: 2 Unit(s) SubCutaneous (:44)  potassium chloride    Tablet ER: 20 milliEquivalent(s) Oral (:39)  metoprolol succinate ER: 100 milliGRAM(s) Oral ( 05:41)  hydrALAZINE: 25 milliGRAM(s) Oral ( 05:41)  buMETAnide IVPB: 124 mL/Hr IV Intermittent ( 05:41)  heparin   Injectable: 5000 Unit(s) SubCutaneous ( @ 05:40)  clobetasol 0.05% Cream: 1 Application(s) Topical ( @ 05:40)  calamine/zinc oxide Lotion: 1 Application(s) Topical ( 05:40)  buMETAnide IVPB: 124 mL/Hr IV Intermittent ( 23:11)  calamine/zinc oxide Lotion: 1 Application(s) Topical ( @ 22:20)  insulin glargine Injectable (LANTUS): 54 Unit(s) SubCutaneous ( 22:19)  insulin lispro (ADMELOG) corrective regimen sliding scale: 4 Unit(s) SubCutaneous (:16)  levothyroxine Injectable: 25 MICROGram(s) IV Push ( 22:15)  heparin   Injectable: 5000 Unit(s) SubCutaneous ( 22:14)  senna: 2 Tablet(s) Oral (:13)  melatonin: 3 milliGRAM(s) Oral ( 22:13)  atorvastatin: 80 milliGRAM(s) Oral ( 22:13)        OBJECTIVE:     @ 07:01  -  01-23 @ 07:00  --------------------------------------------------------  IN: 240 mL / OUT: 400 mL / NET: -160 mL      CAPILLARY BLOOD GLUCOSE      POCT Blood Glucose.: 184 mg/dL (2021 07:40)        VITALS:  T(F): 98 (21 @ 19:56), Max: 98 (21 @ 11:11)  HR: 80 (21 @ 23:10) (80 - 96)  BP: 91/56 (21 @ 23:10) (91/56 - 107/57)  BP(mean): --  ABP: --  ABP(mean): --  RR: 19 (21 @ 19:56) (19 - 19)  SpO2: 98% (21 @ 19:56) (98% - 98%)    PHYSICAL EXAM:  GENERAL: NAD, lying in bed comfortably  HEAD:  Atraumatic, Normocephalic  EYES: EOMI, PERRLA, conjunctiva and sclera clear  ENT: Moist mucous membranes  NECK: Supple, No JVD  CHEST/LUNG: Clear to auscultation bilaterally; No rales, rhonchi, wheezing, or rubs. Unlabored respirations  HEART: Regular rate and rhythm; No murmurs, rubs, or gallops  ABDOMEN: Bowel sounds present; Soft, Nontender, Nondistended. No hepatomegaly  EXTREMITIES:  2+ Peripheral Pulses, brisk capillary refill. No clubbing, cyanosis, or edema  NERVOUS SYSTEM:  Alert & Oriented X3, speech clear. No deficits   MSK: FROM all 4 extremities, full and equal strength  SKIN: No rashes or lesions    HOSPITAL MEDICATIONS:  Standing Meds:  aspirin enteric coated 81 milliGRAM(s) Oral daily  atorvastatin 80 milliGRAM(s) Oral at bedtime  buMETAnide IVPB 3 milliGRAM(s) IV Intermittent every 8 hours  calamine/zinc oxide Lotion 1 Application(s) Topical three times a day  clobetasol 0.05% Cream 1 Application(s) Topical every 12 hours  clopidogrel Tablet 75 milliGRAM(s) Oral daily  dextrose 40% Gel 15 Gram(s) Oral once  dextrose 5%. 1000 milliLiter(s) IV Continuous <Continuous>  dextrose 5%. 1000 milliLiter(s) IV Continuous <Continuous>  dextrose 50% Injectable 25 Gram(s) IV Push once  dextrose 50% Injectable 12.5 Gram(s) IV Push once  dextrose 50% Injectable 25 Gram(s) IV Push once  glucagon  Injectable 1 milliGRAM(s) IntraMuscular once  heparin   Injectable 5000 Unit(s) SubCutaneous every 8 hours  hydrALAZINE 25 milliGRAM(s) Oral every 8 hours  insulin glargine Injectable (LANTUS) 54 Unit(s) SubCutaneous every morning  insulin glargine Injectable (LANTUS) 54 Unit(s) SubCutaneous at bedtime  insulin lispro (ADMELOG) corrective regimen sliding scale   SubCutaneous three times a day before meals  insulin lispro (ADMELOG) corrective regimen sliding scale   SubCutaneous at bedtime  insulin lispro Injectable (ADMELOG) 39 Unit(s) SubCutaneous three times a day before meals  levothyroxine Injectable 25 MICROGram(s) IV Push at bedtime  melatonin 3 milliGRAM(s) Oral at bedtime  metoprolol succinate  milliGRAM(s) Oral daily  milrinone Infusion 0.25 MICROgram(s)/kG/Min IV Continuous <Continuous>  potassium chloride    Tablet ER 20 milliEquivalent(s) Oral every 2 hours  senna 2 Tablet(s) Oral at bedtime      PRN Meds:  polyethylene glycol 3350 17 Gram(s) Oral two times a day PRN      LABS:  CBC 21 @ 04:07                        9.8    9.76  )-----------( 431                   31.3       Hgb trend: 9.8 <-- , 10.3 <-- , 10.1 <--   WBC trend: 9.76 <-- , 12.07 <-- , 10.02 <--       CMP 21 @ 04:07    133<L>  |  96  |  128<H>  ----------------------------<  156<H>  3.4<L>   |  21<L>  |  2.40<H>    Ca    9.7      21 @ 04:07  Phos  4.8       Mg     2.6             Serum Cr trend: 2.40 <-- , 2.15 <-- , 2.10 <--       Urinalysis Basic - ( 2021 22:32 )    Color: Light Yellow / Appearance: Clear / S.011 / pH: x  Gluc: x / Ketone: Negative  / Bili: Negative / Urobili: Negative   Blood: x / Protein: 30 mg/dL / Nitrite: Negative   Leuk Esterase: Large / RBC: 2 /hpf / WBC 35 /HPF   Sq Epi: x / Non Sq Epi: 2 /hpf / Bacteria: Negative      ABG Trend:         MICROBIOLOGY:       RADIOLOGY:  [ ] Reviewed and interpreted by me    EKG:   Author:   Eugenie Joy MD  Internal Medicine, PGY2  [phone]    Patient:  SELVIN CLAIRE  [id number]    Progress Note    Interval events: No acute events. Patient complains of fatigue. Denies SOB.   Pertinent ROS (if any):      Administered:  insulin glargine Injectable (LANTUS): 54 Unit(s) SubCutaneous ( @ 07:46)  insulin lispro Injectable (ADMELOG): 39 Unit(s) SubCutaneous (:44)  insulin lispro (ADMELOG) corrective regimen sliding scale: 2 Unit(s) SubCutaneous (:44)  potassium chloride    Tablet ER: 20 milliEquivalent(s) Oral ( 07:39)  metoprolol succinate ER: 100 milliGRAM(s) Oral ( @ 05:41)  hydrALAZINE: 25 milliGRAM(s) Oral ( @ 05:41)  buMETAnide IVPB: 124 mL/Hr IV Intermittent ( 05:41)  heparin   Injectable: 5000 Unit(s) SubCutaneous ( @ 05:40)  clobetasol 0.05% Cream: 1 Application(s) Topical ( 05:40)  calamine/zinc oxide Lotion: 1 Application(s) Topical ( 05:40)  buMETAnide IVPB: 124 mL/Hr IV Intermittent ( @ 23:11)  calamine/zinc oxide Lotion: 1 Application(s) Topical ( 22:20)  insulin glargine Injectable (LANTUS): 54 Unit(s) SubCutaneous ( 22:19)  insulin lispro (ADMELOG) corrective regimen sliding scale: 4 Unit(s) SubCutaneous (:16)  levothyroxine Injectable: 25 MICROGram(s) IV Push ( 22:15)  heparin   Injectable: 5000 Unit(s) SubCutaneous ( 22:14)  senna: 2 Tablet(s) Oral (:13)  melatonin: 3 milliGRAM(s) Oral ( 22:13)  atorvastatin: 80 milliGRAM(s) Oral ( 22:13)        OBJECTIVE:     @ 07:  -   @ 07:00  --------------------------------------------------------  IN: 240 mL / OUT: 400 mL / NET: -160 mL      CAPILLARY BLOOD GLUCOSE      POCT Blood Glucose.: 184 mg/dL (2021 07:40)        VITALS:  T(F): 98 (21 @ 19:56), Max: 98 (21 @ 11:11)  HR: 80 (21 @ 23:10) (80 - 96)  BP: 91/56 (21 @ 23:10) (91/56 - 107/57)  BP(mean): --  ABP: --  ABP(mean): --  RR: 19 (21 @ 19:56) (19 - 19)  SpO2: 98% (21 @ 19:56) (98% - 98%)    PHYSICAL EXAM:  GENERAL: NAD, cachectic   HEAD:  Atraumatic, Normocephalic  EYES: EOMI, PERRLA, conjunctiva and sclera clear  CHEST/LUNG: increased bibasilar crackles, no wheeze  HEART: Regular rate and rhythm; No murmur  ABDOMEN: Soft, Nontender, Nondistended; Bowel sounds present  EXTREMITIES: no edema  NERVOUS SYSTEM:  Alert & Oriented X3, no focal deficits.      HOSPITAL MEDICATIONS:  Standing Meds:  aspirin enteric coated 81 milliGRAM(s) Oral daily  atorvastatin 80 milliGRAM(s) Oral at bedtime  buMETAnide IVPB 3 milliGRAM(s) IV Intermittent every 8 hours  calamine/zinc oxide Lotion 1 Application(s) Topical three times a day  clobetasol 0.05% Cream 1 Application(s) Topical every 12 hours  clopidogrel Tablet 75 milliGRAM(s) Oral daily  dextrose 40% Gel 15 Gram(s) Oral once  dextrose 5%. 1000 milliLiter(s) IV Continuous <Continuous>  dextrose 5%. 1000 milliLiter(s) IV Continuous <Continuous>  dextrose 50% Injectable 25 Gram(s) IV Push once  dextrose 50% Injectable 12.5 Gram(s) IV Push once  dextrose 50% Injectable 25 Gram(s) IV Push once  glucagon  Injectable 1 milliGRAM(s) IntraMuscular once  heparin   Injectable 5000 Unit(s) SubCutaneous every 8 hours  hydrALAZINE 25 milliGRAM(s) Oral every 8 hours  insulin glargine Injectable (LANTUS) 54 Unit(s) SubCutaneous every morning  insulin glargine Injectable (LANTUS) 54 Unit(s) SubCutaneous at bedtime  insulin lispro (ADMELOG) corrective regimen sliding scale   SubCutaneous three times a day before meals  insulin lispro (ADMELOG) corrective regimen sliding scale   SubCutaneous at bedtime  insulin lispro Injectable (ADMELOG) 39 Unit(s) SubCutaneous three times a day before meals  levothyroxine Injectable 25 MICROGram(s) IV Push at bedtime  melatonin 3 milliGRAM(s) Oral at bedtime  metoprolol succinate  milliGRAM(s) Oral daily  milrinone Infusion 0.25 MICROgram(s)/kG/Min IV Continuous <Continuous>  potassium chloride    Tablet ER 20 milliEquivalent(s) Oral every 2 hours  senna 2 Tablet(s) Oral at bedtime      PRN Meds:  polyethylene glycol 3350 17 Gram(s) Oral two times a day PRN      LABS:  CBC 21 @ 04:07                        9.8    9.76  )-----------( 431                   31.3       Hgb trend: 9.8 <-- , 10.3 <-- , 10.1 <--   WBC trend: 9.76 <-- , 12.07 <-- , 10.02 <--       CMP 21 @ 04:07    133<L>  |  96  |  128<H>  ----------------------------<  156<H>  3.4<L>   |  21<L>  |  2.40<H>    Ca    9.7      21 @ 04:07  Phos  4.8       Mg     2.6             Serum Cr trend: 2.40 <-- , 2.15 <-- , 2.10 <--       Urinalysis Basic - ( 2021 22:32 )    Color: Light Yellow / Appearance: Clear / S.011 / pH: x  Gluc: x / Ketone: Negative  / Bili: Negative / Urobili: Negative   Blood: x / Protein: 30 mg/dL / Nitrite: Negative   Leuk Esterase: Large / RBC: 2 /hpf / WBC 35 /HPF   Sq Epi: x / Non Sq Epi: 2 /hpf / Bacteria: Negative      ABG Trend:         MICROBIOLOGY:       RADIOLOGY:  [ ] Reviewed and interpreted by me    EKG:

## 2021-01-24 LAB
ANION GAP SERPL CALC-SCNC: 17 MMOL/L — SIGNIFICANT CHANGE UP (ref 5–17)
ANION GAP SERPL CALC-SCNC: 18 MMOL/L — HIGH (ref 5–17)
BUN SERPL-MCNC: 106 MG/DL — HIGH (ref 7–23)
BUN SERPL-MCNC: 116 MG/DL — HIGH (ref 7–23)
CALCIUM SERPL-MCNC: 9 MG/DL — SIGNIFICANT CHANGE UP (ref 8.4–10.5)
CALCIUM SERPL-MCNC: 9.6 MG/DL — SIGNIFICANT CHANGE UP (ref 8.4–10.5)
CHLORIDE SERPL-SCNC: 96 MMOL/L — SIGNIFICANT CHANGE UP (ref 96–108)
CHLORIDE SERPL-SCNC: 99 MMOL/L — SIGNIFICANT CHANGE UP (ref 96–108)
CO2 SERPL-SCNC: 21 MMOL/L — LOW (ref 22–31)
CO2 SERPL-SCNC: 22 MMOL/L — SIGNIFICANT CHANGE UP (ref 22–31)
CREAT SERPL-MCNC: 2.08 MG/DL — HIGH (ref 0.5–1.3)
CREAT SERPL-MCNC: 2.18 MG/DL — HIGH (ref 0.5–1.3)
GLUCOSE BLDC GLUCOMTR-MCNC: 226 MG/DL — HIGH (ref 70–99)
GLUCOSE BLDC GLUCOMTR-MCNC: 253 MG/DL — HIGH (ref 70–99)
GLUCOSE BLDC GLUCOMTR-MCNC: 260 MG/DL — HIGH (ref 70–99)
GLUCOSE BLDC GLUCOMTR-MCNC: 318 MG/DL — HIGH (ref 70–99)
GLUCOSE SERPL-MCNC: 207 MG/DL — HIGH (ref 70–99)
GLUCOSE SERPL-MCNC: 246 MG/DL — HIGH (ref 70–99)
HCT VFR BLD CALC: 33.6 % — LOW (ref 34.5–45)
HGB BLD-MCNC: 10.4 G/DL — LOW (ref 11.5–15.5)
MAGNESIUM SERPL-MCNC: 2.6 MG/DL — SIGNIFICANT CHANGE UP (ref 1.6–2.6)
MCHC RBC-ENTMCNC: 26.1 PG — LOW (ref 27–34)
MCHC RBC-ENTMCNC: 31 GM/DL — LOW (ref 32–36)
MCV RBC AUTO: 84.4 FL — SIGNIFICANT CHANGE UP (ref 80–100)
NRBC # BLD: 0 /100 WBCS — SIGNIFICANT CHANGE UP (ref 0–0)
PHOSPHATE SERPL-MCNC: 4.7 MG/DL — HIGH (ref 2.5–4.5)
PLATELET # BLD AUTO: 514 K/UL — HIGH (ref 150–400)
POTASSIUM SERPL-MCNC: 3.8 MMOL/L — SIGNIFICANT CHANGE UP (ref 3.5–5.3)
POTASSIUM SERPL-MCNC: 4.3 MMOL/L — SIGNIFICANT CHANGE UP (ref 3.5–5.3)
POTASSIUM SERPL-SCNC: 3.8 MMOL/L — SIGNIFICANT CHANGE UP (ref 3.5–5.3)
POTASSIUM SERPL-SCNC: 4.3 MMOL/L — SIGNIFICANT CHANGE UP (ref 3.5–5.3)
RBC # BLD: 3.98 M/UL — SIGNIFICANT CHANGE UP (ref 3.8–5.2)
RBC # FLD: 16 % — HIGH (ref 10.3–14.5)
SODIUM SERPL-SCNC: 135 MMOL/L — SIGNIFICANT CHANGE UP (ref 135–145)
SODIUM SERPL-SCNC: 138 MMOL/L — SIGNIFICANT CHANGE UP (ref 135–145)
WBC # BLD: 12.14 K/UL — HIGH (ref 3.8–10.5)
WBC # FLD AUTO: 12.14 K/UL — HIGH (ref 3.8–10.5)

## 2021-01-24 PROCEDURE — 99233 SBSQ HOSP IP/OBS HIGH 50: CPT | Mod: GC

## 2021-01-24 RX ORDER — CEFTRIAXONE 500 MG/1
1000 INJECTION, POWDER, FOR SOLUTION INTRAMUSCULAR; INTRAVENOUS EVERY 24 HOURS
Refills: 0 | Status: DISCONTINUED | OUTPATIENT
Start: 2021-01-24 | End: 2021-01-24

## 2021-01-24 RX ORDER — INSULIN GLARGINE 100 [IU]/ML
56 INJECTION, SOLUTION SUBCUTANEOUS AT BEDTIME
Refills: 0 | Status: DISCONTINUED | OUTPATIENT
Start: 2021-01-24 | End: 2021-01-27

## 2021-01-24 RX ORDER — INSULIN GLARGINE 100 [IU]/ML
56 INJECTION, SOLUTION SUBCUTANEOUS EVERY MORNING
Refills: 0 | Status: DISCONTINUED | OUTPATIENT
Start: 2021-01-25 | End: 2021-01-27

## 2021-01-24 RX ORDER — BUMETANIDE 0.25 MG/ML
0.5 INJECTION INTRAMUSCULAR; INTRAVENOUS
Qty: 20 | Refills: 0 | Status: DISCONTINUED | OUTPATIENT
Start: 2021-01-24 | End: 2021-01-25

## 2021-01-24 RX ORDER — ALPRAZOLAM 0.25 MG
0.25 TABLET ORAL ONCE
Refills: 0 | Status: DISCONTINUED | OUTPATIENT
Start: 2021-01-24 | End: 2021-01-24

## 2021-01-24 RX ORDER — ALPRAZOLAM 0.25 MG
0.25 TABLET ORAL EVERY 8 HOURS
Refills: 0 | Status: DISCONTINUED | OUTPATIENT
Start: 2021-01-24 | End: 2021-01-31

## 2021-01-24 RX ADMIN — Medication 0.25 MILLIGRAM(S): at 10:02

## 2021-01-24 RX ADMIN — Medication 39 UNIT(S): at 07:36

## 2021-01-24 RX ADMIN — Medication 25 MILLIGRAM(S): at 04:56

## 2021-01-24 RX ADMIN — HEPARIN SODIUM 5000 UNIT(S): 5000 INJECTION INTRAVENOUS; SUBCUTANEOUS at 21:39

## 2021-01-24 RX ADMIN — CALAMINE AND ZINC OXIDE AND PHENOL 1 APPLICATION(S): 160; 10 LOTION TOPICAL at 14:42

## 2021-01-24 RX ADMIN — BUMETANIDE 2.5 MG/HR: 0.25 INJECTION INTRAMUSCULAR; INTRAVENOUS at 12:41

## 2021-01-24 RX ADMIN — Medication 1 APPLICATION(S): at 04:56

## 2021-01-24 RX ADMIN — HEPARIN SODIUM 5000 UNIT(S): 5000 INJECTION INTRAVENOUS; SUBCUTANEOUS at 04:56

## 2021-01-24 RX ADMIN — Medication 25 MICROGRAM(S): at 21:39

## 2021-01-24 RX ADMIN — Medication 8: at 11:37

## 2021-01-24 RX ADMIN — Medication 25 MILLIGRAM(S): at 14:42

## 2021-01-24 RX ADMIN — Medication 4: at 07:36

## 2021-01-24 RX ADMIN — Medication 2: at 21:39

## 2021-01-24 RX ADMIN — Medication 81 MILLIGRAM(S): at 11:37

## 2021-01-24 RX ADMIN — BUMETANIDE 124 MILLIGRAM(S): 0.25 INJECTION INTRAMUSCULAR; INTRAVENOUS at 04:57

## 2021-01-24 RX ADMIN — CLOPIDOGREL BISULFATE 75 MILLIGRAM(S): 75 TABLET, FILM COATED ORAL at 11:37

## 2021-01-24 RX ADMIN — HEPARIN SODIUM 5000 UNIT(S): 5000 INJECTION INTRAVENOUS; SUBCUTANEOUS at 14:42

## 2021-01-24 RX ADMIN — Medication 1 APPLICATION(S): at 16:36

## 2021-01-24 RX ADMIN — Medication 6: at 16:35

## 2021-01-24 RX ADMIN — CEFTRIAXONE 100 MILLIGRAM(S): 500 INJECTION, POWDER, FOR SOLUTION INTRAMUSCULAR; INTRAVENOUS at 12:34

## 2021-01-24 RX ADMIN — Medication 39 UNIT(S): at 11:37

## 2021-01-24 RX ADMIN — ATORVASTATIN CALCIUM 80 MILLIGRAM(S): 80 TABLET, FILM COATED ORAL at 21:39

## 2021-01-24 RX ADMIN — CALAMINE AND ZINC OXIDE AND PHENOL 1 APPLICATION(S): 160; 10 LOTION TOPICAL at 21:39

## 2021-01-24 RX ADMIN — Medication 100 MILLIGRAM(S): at 04:56

## 2021-01-24 RX ADMIN — Medication 3 MILLIGRAM(S): at 21:39

## 2021-01-24 RX ADMIN — INSULIN GLARGINE 54 UNIT(S): 100 INJECTION, SOLUTION SUBCUTANEOUS at 07:36

## 2021-01-24 RX ADMIN — Medication 39 UNIT(S): at 16:35

## 2021-01-24 RX ADMIN — Medication 25 MILLIGRAM(S): at 21:39

## 2021-01-24 RX ADMIN — INSULIN GLARGINE 56 UNIT(S): 100 INJECTION, SOLUTION SUBCUTANEOUS at 21:42

## 2021-01-24 RX ADMIN — CALAMINE AND ZINC OXIDE AND PHENOL 1 APPLICATION(S): 160; 10 LOTION TOPICAL at 04:57

## 2021-01-24 NOTE — PROGRESS NOTE ADULT - PROBLEM SELECTOR PLAN 1
- 2/2 CHF exacerbation.  EF 20% w/ mitral stenosis   - s/p TID 4mg Bumex; stopped 1/18 b/c of increased Cr and euvolemic status   - CXR on admission showed bilateral predominantly lower lobe hazy opacities which is likely 2/2 fluid.  Not presenting w/ clinical signs of PNA and CT chest showed no focal consolidations.  Monitoring of abx.  Blood cx also negative  - s/p Lopressor for MS; was titrating dose to achieve HR 60-70.  D/C'd 1/20 after RHC 1/20 showed elevated filling pressures.  Pt now on milrinone drip and Bumex restarted.  Bladder scan shows no evidence of retention (120cc)  - BPs remain soft, will f/u HF recs regarding medications - 2/2 CHF exacerbation.  EF 20% w/ mitral stenosis   - s/p TID 4mg Bumex; stopped 1/18 b/c of increased Cr and euvolemic status   - CXR on admission showed bilateral predominantly lower lobe hazy opacities which is likely 2/2 fluid.  Not presenting w/ clinical signs of PNA and CT chest showed no focal consolidations.  Monitoring of abx.  Blood cx also negative  - s/p Lopressor for MS; was titrating dose to achieve HR 60-70.  D/C'd 1/20 after RHC 1/20 showed elevated filling pressures.  Pt now on milrinone drip and Bumex restarted.  Bladder scan shows no evidence of retention (120cc)  - BPs remain soft  - per HF restarted bumex drip at 0.5mg/hr 1/24; c/w milrinone drip

## 2021-01-24 NOTE — PROGRESS NOTE ADULT - ASSESSMENT
74 yo F w/ PMH of CKD stage 4 (baseline Cr 1.9-2.2) HTN, T2DM, CAD s/p CABG X3 (2014 at [facility]), non-dilated ICM (EF 20-25%), severe mitral regurgitation s/p mitral clip (6/13/19), severe pulm HTN, hypothyroidism who presented for evaluation of SOB and was admitted for ADHF.    CKD stage 4  - baseline Cr 1.9-2.2; has had recurrent MERVIN's over the years  - CKD is likely 2/2 recurrent MERVIN's + underlying DM/HTN  - Scr slightly better today. BUN elevated due to diuretics. Diuretics per heart failure team   - Avoid hypotension   - renal sonogram in the past with simple renal cyst  - d/w Cardiology yesterday to adjust diuretics in view of worsened BUN-BUN is slightly better today  - d/w daughter and son-in-law on 01/23 about worsening renal function and if no improvement likely need for dialysis. They were also informed that she will be a poor candidate for HD in view of CHF. They understand but wants HD if needed. Consent for HD is in chart.  - She is not uremic, monitor at present  - Avoid nephrotoxins including NSAIDs, IV contrast (if possible), [employer] products  - monitor I/O's accurately    HTN  BP controlled  Low salt diet   MOnitor BP    Proteinuria/Hematuria  - likely from underlying DM  - has 1.8 grams proteinuria  - Hep B, Hep C, HIV RF, C3, C4, p-ANCA, c-ANCA, RPR neg  - weak gamma-migrating protein, weak IgG lambda protein band noted in the past. Pt to follow w/ heme   - DEAN 1:320 positive  - RPR neg, FTA +

## 2021-01-24 NOTE — PROGRESS NOTE ADULT - SUBJECTIVE AND OBJECTIVE BOX
Chief complaint  Patient is a 75y old  Female who presents with a chief complaint of increased work of breathing (24 Jan 2021 14:02)   Review of systems  Patient in bed, appears comfortable.    Labs and Fingersticks  CAPILLARY BLOOD GLUCOSE      POCT Blood Glucose.: 253 mg/dL (24 Jan 2021 16:15)  POCT Blood Glucose.: 318 mg/dL (24 Jan 2021 11:16)  POCT Blood Glucose.: 226 mg/dL (24 Jan 2021 07:15)  POCT Blood Glucose.: 218 mg/dL (23 Jan 2021 21:00)      Anion Gap, Serum: 17 (01-24 @ 19:57)  Anion Gap, Serum: 18 <H> (01-24 @ 05:58)  Anion Gap, Serum: 16 (01-23 @ 15:55)  Anion Gap, Serum: 16 (01-23 @ 04:07)      Calcium, Total Serum: 9.0 (01-24 @ 19:57)  Calcium, Total Serum: 9.6 (01-24 @ 05:58)  Calcium, Total Serum: 9.6 (01-23 @ 15:55)  Calcium, Total Serum: 9.7 (01-23 @ 04:07)          01-24    135  |  96  |  x   ----------------------------<  246<H>  3.8   |  22  |  2.08<H>    Ca    9.0      24 Jan 2021 19:57  Phos  4.7     01-24  Mg     2.6     01-24                          10.4   12.14 )-----------( 514      ( 24 Jan 2021 05:58 )             33.6     Medications  MEDICATIONS  (STANDING):  aspirin enteric coated 81 milliGRAM(s) Oral daily  atorvastatin 80 milliGRAM(s) Oral at bedtime  buMETAnide Infusion 0.5 mG/Hr (2.5 mL/Hr) IV Continuous <Continuous>  calamine/zinc oxide Lotion 1 Application(s) Topical three times a day  clobetasol 0.05% Cream 1 Application(s) Topical every 12 hours  clopidogrel Tablet 75 milliGRAM(s) Oral daily  dextrose 40% Gel 15 Gram(s) Oral once  dextrose 5%. 1000 milliLiter(s) (50 mL/Hr) IV Continuous <Continuous>  dextrose 5%. 1000 milliLiter(s) (100 mL/Hr) IV Continuous <Continuous>  dextrose 50% Injectable 25 Gram(s) IV Push once  dextrose 50% Injectable 12.5 Gram(s) IV Push once  dextrose 50% Injectable 25 Gram(s) IV Push once  glucagon  Injectable 1 milliGRAM(s) IntraMuscular once  heparin   Injectable 5000 Unit(s) SubCutaneous every 8 hours  hydrALAZINE 25 milliGRAM(s) Oral every 8 hours  insulin glargine Injectable (LANTUS) 56 Unit(s) SubCutaneous at bedtime  insulin lispro (ADMELOG) corrective regimen sliding scale   SubCutaneous three times a day before meals  insulin lispro (ADMELOG) corrective regimen sliding scale   SubCutaneous at bedtime  insulin lispro Injectable (ADMELOG) 39 Unit(s) SubCutaneous three times a day before meals  levothyroxine Injectable 25 MICROGram(s) IV Push at bedtime  melatonin 3 milliGRAM(s) Oral at bedtime  metoprolol succinate  milliGRAM(s) Oral daily  milrinone Infusion 0.25 MICROgram(s)/kG/Min (3.74 mL/Hr) IV Continuous <Continuous>  senna 2 Tablet(s) Oral at bedtime      Physical Exam  General: Patient comfortable in bed  Vital Signs Last 12 Hrs  T(F): 97.9 (01-24-21 @ 20:11), Max: 97.9 (01-24-21 @ 20:11)  HR: 85 (01-24-21 @ 20:11) (85 - 89)  BP: 111/59 (01-24-21 @ 20:11) (107/64 - 115/57)  BP(mean): --  RR: 20 (01-24-21 @ 20:11) (20 - 20)  SpO2: 97% (01-24-21 @ 20:11) (97% - 97%)  Neck: No palpable thyroid nodules.  CVS: S1S2, No murmurs  Respiratory: No wheezing, no crepitations  GI: Abdomen soft, bowel sounds positive  Musculoskeletal:  edema lower extremities.     Diagnostics

## 2021-01-24 NOTE — PROGRESS NOTE ADULT - ASSESSMENT
75y.o F Hungarian speaking h/o HTN, HLD, DM, CAD s/p CABG 2014 and prior PCI, severe mitral regurgitation (s/p mitral clip 2019), pulmonary HTN (TTE 2019), HF (EF 21 % June 2019), CKD IV not on dialysis (b/l SCr 2-2.2), hypothyroidism BIBEMS 2/2 worsening work of breathing, found to have crackles, elevated proBNP and b/l pulmonary edema on CXR c/w acute exacerbation of CHF.

## 2021-01-24 NOTE — PROGRESS NOTE ADULT - ATTENDING COMMENTS
Reports improved dyspnea but weight increased from when hemodynamics revealed elevated filling pressures and JVP does appear elevated. Will trial augmentation of diuretics with bumex drip but low threshold to stop if worsening renal function. Can likely wean milrinone soon.

## 2021-01-24 NOTE — PROGRESS NOTE ADULT - ASSESSMENT
Briefly, 76 yo F with HTN, HLD, DM2 (A1c 7.5 10/19), CAD s/p CABG (LIMA to LAD, SVG to OM, SVG to PDA) and prior PCI, ICM HFrEF (EF 20-25%, LVEDD 4.7 cm), severe mitral regurgitation s/p mitral clip (6/19; mean MV gradient 7-8), severe pulmonary HTN, CKD IV (b/l Cr 1.9-2.2), hypothyroidism who BIBEMS 2/2 worsening work of breathing presents in acute on chronic systolic CHF with NYHA IV functional status. Was notably on midodrine as outpatient since [facility] 10/19. Has been having increased work of breathing at home with failure to thrive. Initially required Bipap and was given bumex with some improvement. Continues to be dyspneic. Etiology of her symptoms may be secondary to degree of mitral stenosis given HR in 80-90s with mean gradient of 8 on TTE. Also, per nursing staff some of her dyspnea may be anxiety related. She is s/p RHC 1/20 which showed mildly elevated filling pressures with preserved CO and was started on milrinone to assist with diuresis given her fluctuating renal function.    1/20/20 RHC: RA 15, RV 49/10, PA 50/21 32, PCWP 19, PA 55.4, Ao 98. [place] 3.5/2.5

## 2021-01-24 NOTE — PROGRESS NOTE ADULT - PROBLEM SELECTOR PLAN 1
- continue Bumex to 3 mg IV BID to target 2-3 lbs weight loss then transition to oral diuretics   - continue milrinone at 0.25 mcg/kg/min, with hopes to wean once fluid balance is optimized  - continue with HDZN 25 mg q8h  - continue with Toprol  mg daily; goal HR 60-70  - daily standing weight and strict I&Os - given no significant weight loss, will start bumex gtt 0.5mg/hr without bolus  - continue milrinone at 0.25 mcg/kg/min, with hopes to wean once fluid balance is optimized  - continue with HDZN 25 mg q8h  - continue with Toprol  mg daily; goal HR 60-70  - daily standing weight and strict I&Os

## 2021-01-24 NOTE — PROGRESS NOTE ADULT - PROBLEM SELECTOR PLAN 2
- s/p clip; has component of mitral stenosis (mean gradient 8-9); will be important to keep HR controlled with BB as above

## 2021-01-24 NOTE — PROGRESS NOTE ADULT - SUBJECTIVE AND OBJECTIVE BOX
Jacquie Amos MD  Internal Medicine, PGY-3  [phone]    Patient is a 75y old  Female who presents with a chief complaint of increased work of breathing (2021 18:53)      SUBJECTIVE / OVERNIGHT EVENTS: no events overnight.        REVIEW OF SYSTEMS:    CONSTITUTIONAL: No weakness, fevers or chills  EYES: No visual changes; No blurry vision  ENT:  No pain or stiffness; No vertigo or throat pain  RESPIRATORY: No cough, wheezing, hemoptysis; No shortness of breath  CARDIOVASCULAR: No chest pain or palpitations  GASTROINTESTINAL: No abdominal or epigastric pain. No nausea, vomiting, or hematemesis; No diarrhea or constipation. No melena or hematochezia.  GENITOURINARY: No dysuria, frequency or hematuria  NEUROLOGICAL: No numbness, No HA  SKIN: No itching, rashes  MSK: no joint pain, no muscle pain    MEDICATIONS  (STANDING):  aspirin enteric coated 81 milliGRAM(s) Oral daily  atorvastatin 80 milliGRAM(s) Oral at bedtime  buMETAnide IVPB 3 milliGRAM(s) IV Intermittent two times a day  calamine/zinc oxide Lotion 1 Application(s) Topical three times a day  clobetasol 0.05% Cream 1 Application(s) Topical every 12 hours  clopidogrel Tablet 75 milliGRAM(s) Oral daily  dextrose 40% Gel 15 Gram(s) Oral once  dextrose 5%. 1000 milliLiter(s) (50 mL/Hr) IV Continuous <Continuous>  dextrose 5%. 1000 milliLiter(s) (100 mL/Hr) IV Continuous <Continuous>  dextrose 50% Injectable 25 Gram(s) IV Push once  dextrose 50% Injectable 12.5 Gram(s) IV Push once  dextrose 50% Injectable 25 Gram(s) IV Push once  glucagon  Injectable 1 milliGRAM(s) IntraMuscular once  heparin   Injectable 5000 Unit(s) SubCutaneous every 8 hours  hydrALAZINE 25 milliGRAM(s) Oral every 8 hours  insulin glargine Injectable (LANTUS) 54 Unit(s) SubCutaneous every morning  insulin glargine Injectable (LANTUS) 54 Unit(s) SubCutaneous at bedtime  insulin lispro (ADMELOG) corrective regimen sliding scale   SubCutaneous three times a day before meals  insulin lispro (ADMELOG) corrective regimen sliding scale   SubCutaneous at bedtime  insulin lispro Injectable (ADMELOG) 39 Unit(s) SubCutaneous three times a day before meals  levothyroxine Injectable 25 MICROGram(s) IV Push at bedtime  melatonin 3 milliGRAM(s) Oral at bedtime  metoprolol succinate  milliGRAM(s) Oral daily  milrinone Infusion 0.25 MICROgram(s)/kG/Min (3.74 mL/Hr) IV Continuous <Continuous>  senna 2 Tablet(s) Oral at bedtime    MEDICATIONS  (PRN):  polyethylene glycol 3350 17 Gram(s) Oral two times a day PRN Constipation      CAPILLARY BLOOD GLUCOSE      POCT Blood Glucose.: 226 mg/dL (2021 07:15)  POCT Blood Glucose.: 218 mg/dL (2021 21:00)  POCT Blood Glucose.: 188 mg/dL (2021 16:37)  POCT Blood Glucose.: 220 mg/dL (2021 11:39)    I&O's Summary    2021 07:01  -  2021 07:00  --------------------------------------------------------  IN: 606 mL / OUT: 1700 mL / NET: -1094 mL        PHYSICAL EXAM:  Vital Signs Last 24 Hrs  T(C): 36.3 (2021 04:47), Max: 36.5 (2021 19:59)  T(F): 97.4 (2021 04:47), Max: 97.7 (2021 19:59)  HR: 87 (2021 04:47) (79 - 87)  BP: 114/63 (2021 04:47) (100/62 - 114/63)  BP(mean): --  RR: 20 (2021 04:47) (19 - 20)  SpO2: 99% (2021 04:47) (98% - 100%)    PHYSICAL EXAM:  GENERAL: NAD, cachectic   HEAD:  Atraumatic, Normocephalic  EYES: EOMI, PERRLA, conjunctiva and sclera clear  CHEST/LUNG: increased bibasilar crackles, no wheeze  HEART: Regular rate and rhythm; No murmur  ABDOMEN: Soft, Nontender, Nondistended; Bowel sounds present  EXTREMITIES: no edema  NERVOUS SYSTEM:  Alert & Oriented X3, no focal deficits.      LABS:                        10.4   12.14 )-----------( 514      ( 2021 05:58 )             33.6         138  |  99  |  106<H>  ----------------------------<  207<H>  4.3   |  21<L>  |  2.18<H>    Ca    9.6      2021 05:58  Phos  4.7       Mg     2.6                 Urinalysis Basic - ( 2021 22:32 )    Color: Light Yellow / Appearance: Clear / S.011 / pH: x  Gluc: x / Ketone: Negative  / Bili: Negative / Urobili: Negative   Blood: x / Protein: 30 mg/dL / Nitrite: Negative   Leuk Esterase: Large / RBC: 2 /hpf / WBC 35 /HPF   Sq Epi: x / Non Sq Epi: 2 /hpf / Bacteria: Negative        Procalcitonin, Serum: 0.14 ()    C-Reactive Protein, Serum: 0.63 ()    Ferritin, Serum: 111 ()      D-Dimer Assay, Quantitative: 433 ()        RADIOLOGY & ADDITIONAL TESTS:  Results Reviewed:   Imaging Personally Reviewed:  Electrocardiogram Personally Reviewed:    COORDINATION OF CARE:  Care Discussed with Consultants/Other Providers [Y/N]:  Prior or Outpatient Records Reviewed [Y/N]:   Jacquie Amos MD  Internal Medicine, PGY-3  [phone]    Patient is a 75y old  Female who presents with a chief complaint of increased work of breathing (2021 18:53)      SUBJECTIVE / OVERNIGHT EVENTS: no events overnight.  Pt becomes dyspneic when you enter room however breathes comfortably before entering the room. Appears to be anxious and gave 0.25 xanax as she gotten this on previous admissions.       ADDITIONAL REVIEW OF SYSTEMS:    CONSTITUTIONAL: No weakness, fevers or chills  RESPIRATORY: + SOB, no coughing   CARDIOVASCULAR: No chest pain  GASTROINTESTINAL: No abdominal or epigastric pain. No nausea, vomiting, or hematemesis; No diarrhea or constipation. No melena or hematochezia.  GENITOURINARY: No dysuria, frequency or hematuria    MEDICATIONS  (STANDING):  aspirin enteric coated 81 milliGRAM(s) Oral daily  atorvastatin 80 milliGRAM(s) Oral at bedtime  buMETAnide IVPB 3 milliGRAM(s) IV Intermittent two times a day  calamine/zinc oxide Lotion 1 Application(s) Topical three times a day  clobetasol 0.05% Cream 1 Application(s) Topical every 12 hours  clopidogrel Tablet 75 milliGRAM(s) Oral daily  dextrose 40% Gel 15 Gram(s) Oral once  dextrose 5%. 1000 milliLiter(s) (50 mL/Hr) IV Continuous <Continuous>  dextrose 5%. 1000 milliLiter(s) (100 mL/Hr) IV Continuous <Continuous>  dextrose 50% Injectable 25 Gram(s) IV Push once  dextrose 50% Injectable 12.5 Gram(s) IV Push once  dextrose 50% Injectable 25 Gram(s) IV Push once  glucagon  Injectable 1 milliGRAM(s) IntraMuscular once  heparin   Injectable 5000 Unit(s) SubCutaneous every 8 hours  hydrALAZINE 25 milliGRAM(s) Oral every 8 hours  insulin glargine Injectable (LANTUS) 54 Unit(s) SubCutaneous every morning  insulin glargine Injectable (LANTUS) 54 Unit(s) SubCutaneous at bedtime  insulin lispro (ADMELOG) corrective regimen sliding scale   SubCutaneous three times a day before meals  insulin lispro (ADMELOG) corrective regimen sliding scale   SubCutaneous at bedtime  insulin lispro Injectable (ADMELOG) 39 Unit(s) SubCutaneous three times a day before meals  levothyroxine Injectable 25 MICROGram(s) IV Push at bedtime  melatonin 3 milliGRAM(s) Oral at bedtime  metoprolol succinate  milliGRAM(s) Oral daily  milrinone Infusion 0.25 MICROgram(s)/kG/Min (3.74 mL/Hr) IV Continuous <Continuous>  senna 2 Tablet(s) Oral at bedtime    MEDICATIONS  (PRN):  polyethylene glycol 3350 17 Gram(s) Oral two times a day PRN Constipation      CAPILLARY BLOOD GLUCOSE      POCT Blood Glucose.: 226 mg/dL (2021 07:15)  POCT Blood Glucose.: 218 mg/dL (2021 21:00)  POCT Blood Glucose.: 188 mg/dL (2021 16:37)  POCT Blood Glucose.: 220 mg/dL (2021 11:39)    I&O's Summary    2021 07:01  -  2021 07:00  --------------------------------------------------------  IN: 606 mL / OUT: 1700 mL / NET: -1094 mL        PHYSICAL EXAM:  Vital Signs Last 24 Hrs  T(C): 36.3 (2021 04:47), Max: 36.5 (2021 19:59)  T(F): 97.4 (2021 04:47), Max: 97.7 (2021 19:59)  HR: 87 (2021 04:47) (79 - 87)  BP: 114/63 (2021 04:47) (100/62 - 114/63)  BP(mean): --  RR: 20 (2021 04:47) (19 - 20)  SpO2: 99% (2021 04:47) (98% - 100%)    PHYSICAL EXAM:  GENERAL: NAD, cachectic   HEAD:  Atraumatic, Normocephalic  EYES: EOMI, PERRLA, conjunctiva and sclera clear  CHEST/LUNG: increased bibasilar crackles, no wheeze  HEART: Regular rate and rhythm; No murmur  ABDOMEN: Soft, Nontender, Nondistended; Bowel sounds present  EXTREMITIES: no edema  NERVOUS SYSTEM:  Alert & Oriented X3, no focal deficits.      LABS:                        10.4   12.14 )-----------( 514      ( 2021 05:58 )             33.6         138  |  99  |  106<H>  ----------------------------<  207<H>  4.3   |  21<L>  |  2.18<H>    Ca    9.6      2021 05:58  Phos  4.7       Mg     2.6                 Urinalysis Basic - ( 2021 22:32 )    Color: Light Yellow / Appearance: Clear / S.011 / pH: x  Gluc: x / Ketone: Negative  / Bili: Negative / Urobili: Negative   Blood: x / Protein: 30 mg/dL / Nitrite: Negative   Leuk Esterase: Large / RBC: 2 /hpf / WBC 35 /HPF   Sq Epi: x / Non Sq Epi: 2 /hpf / Bacteria: Negative        Procalcitonin, Serum: 0.14 ()    C-Reactive Protein, Serum: 0.63 ()    Ferritin, Serum: 111 ()      D-Dimer Assay, Quantitative: 433 ()

## 2021-01-24 NOTE — PROGRESS NOTE ADULT - SUBJECTIVE AND OBJECTIVE BOX
Cardiology Progress Note    Interval:    Tele:    Medications:  aspirin enteric coated 81 milliGRAM(s) Oral daily  atorvastatin 80 milliGRAM(s) Oral at bedtime  buMETAnide IVPB 3 milliGRAM(s) IV Intermittent two times a day  calamine/zinc oxide Lotion 1 Application(s) Topical three times a day  cefTRIAXone   IVPB 1000 milliGRAM(s) IV Intermittent every 24 hours  clobetasol 0.05% Cream 1 Application(s) Topical every 12 hours  clopidogrel Tablet 75 milliGRAM(s) Oral daily  dextrose 40% Gel 15 Gram(s) Oral once  dextrose 5%. 1000 milliLiter(s) IV Continuous <Continuous>  dextrose 5%. 1000 milliLiter(s) IV Continuous <Continuous>  dextrose 50% Injectable 25 Gram(s) IV Push once  dextrose 50% Injectable 12.5 Gram(s) IV Push once  dextrose 50% Injectable 25 Gram(s) IV Push once  glucagon  Injectable 1 milliGRAM(s) IntraMuscular once  heparin   Injectable 5000 Unit(s) SubCutaneous every 8 hours  hydrALAZINE 25 milliGRAM(s) Oral every 8 hours  insulin glargine Injectable (LANTUS) 56 Unit(s) SubCutaneous at bedtime  insulin lispro (ADMELOG) corrective regimen sliding scale   SubCutaneous three times a day before meals  insulin lispro (ADMELOG) corrective regimen sliding scale   SubCutaneous at bedtime  insulin lispro Injectable (ADMELOG) 39 Unit(s) SubCutaneous three times a day before meals  levothyroxine Injectable 25 MICROGram(s) IV Push at bedtime  melatonin 3 milliGRAM(s) Oral at bedtime  metoprolol succinate  milliGRAM(s) Oral daily  milrinone Infusion 0.25 MICROgram(s)/kG/Min IV Continuous <Continuous>  polyethylene glycol 3350 17 Gram(s) Oral two times a day PRN  senna 2 Tablet(s) Oral at bedtime      Review of Systems:  Constitutional: [ ] Fever [ ] Chills [ ] Fatigue [ ] Weight change   HEENT: [ ] Blurred vision [ ] Eye Pain [ ] Headache [ ] Runny nose [ ] Sore Throat   Respiratory: [ ] Cough [ ] Wheezing [ ] Shortness of breath  Cardiovascular: [ ] Chest Pain [ ] Palpitations [ ] ZEE [ ] PND [ ] Orthopnea  Gastrointestinal: [ ] Abdominal Pain [ ] Diarrhea [ ] Constipation [ ] Hemorrhoids [ ] Nausea [ ] Vomiting  Genitourinary: [ ] Nocturia [ ] Dysuria [ ] Incontinence  Extremities: [ ] Swelling [ ] Joint Pain  Neurologic: [ ] Focal deficit [ ] Paresthesias [ ] Syncope  Lymphatic: [ ] Swelling [ ] Lymphadenopathy   Skin: [ ] Rash [ ] Ecchymoses [ ] Wounds [ ] Lesions  Psychiatry: [ ] Depression [ ] Suicidal/Homicidal Ideation [ ] Anxiety [ ] Sleep Disturbances  [ ] 10 point review of systems is otherwise negative except as mentioned above            [ ]Unable to obtain    Vitals:  T(C): 36.3 (21 @ 04:47), Max: 36.5 (21 @ 19:59)  HR: 87 (21 @ 04:47) (79 - 87)  BP: 114/63 (21 @ 04:47) (100/62 - 114/63)  BP(mean): --  RR: 20 (21 @ 04:47) (19 - 20)  SpO2: 99% (21 @ 04:47) (98% - 100%)  Wt(kg): --  Daily     Daily Weight in k.8 (2021 06:56)  I&O's Summary    2021 07:01  -  2021 07:00  --------------------------------------------------------  IN: 606 mL / OUT: 1700 mL / NET: -1094 mL        Physical Exam:  General: No distress. Comfortable.  HEENT: EOM intact.  Neck: Neck supple. JVP mildly elevated.  Chest: Clear to auscultation bilaterally  CV: RRR. Normal S1 and S2. No murmurs, rub, or gallops. Radial pulses normal.  Abdomen: Soft, non-distended, non-tender  Skin: No rashes or skin breakdown  Extremities: Warm, no edema  Neurology: Alert and oriented times three. Sensation intact  Psych: Affect normal        Labs:                        10.4   12.14 )-----------( 514      ( 2021 05:58 )             33.6         138  |  99  |  106<H>  ----------------------------<  207<H>  4.3   |  21<L>  |  2.18<H>    Ca    9.6      2021 05:58  Phos  4.7       Mg     2.6                         New results/imaging:   Cardiology Progress Note    Interval: Pt resting comfortably in bed. Spoke via . Pt noted generalized itching but denied SOB and orthopnea.     Tele: Sinus rhythm, PVC's    Medications:  aspirin enteric coated 81 milliGRAM(s) Oral daily  atorvastatin 80 milliGRAM(s) Oral at bedtime  buMETAnide IVPB 3 milliGRAM(s) IV Intermittent two times a day  calamine/zinc oxide Lotion 1 Application(s) Topical three times a day  cefTRIAXone   IVPB 1000 milliGRAM(s) IV Intermittent every 24 hours  clobetasol 0.05% Cream 1 Application(s) Topical every 12 hours  clopidogrel Tablet 75 milliGRAM(s) Oral daily  dextrose 40% Gel 15 Gram(s) Oral once  dextrose 5%. 1000 milliLiter(s) IV Continuous <Continuous>  dextrose 5%. 1000 milliLiter(s) IV Continuous <Continuous>  dextrose 50% Injectable 25 Gram(s) IV Push once  dextrose 50% Injectable 12.5 Gram(s) IV Push once  dextrose 50% Injectable 25 Gram(s) IV Push once  glucagon  Injectable 1 milliGRAM(s) IntraMuscular once  heparin   Injectable 5000 Unit(s) SubCutaneous every 8 hours  hydrALAZINE 25 milliGRAM(s) Oral every 8 hours  insulin glargine Injectable (LANTUS) 56 Unit(s) SubCutaneous at bedtime  insulin lispro (ADMELOG) corrective regimen sliding scale   SubCutaneous three times a day before meals  insulin lispro (ADMELOG) corrective regimen sliding scale   SubCutaneous at bedtime  insulin lispro Injectable (ADMELOG) 39 Unit(s) SubCutaneous three times a day before meals  levothyroxine Injectable 25 MICROGram(s) IV Push at bedtime  melatonin 3 milliGRAM(s) Oral at bedtime  metoprolol succinate  milliGRAM(s) Oral daily  milrinone Infusion 0.25 MICROgram(s)/kG/Min IV Continuous <Continuous>  polyethylene glycol 3350 17 Gram(s) Oral two times a day PRN  senna 2 Tablet(s) Oral at bedtime      Review of Systems:  Constitutional: [ ] Fever [ ] Chills [ ] Fatigue [ ] Weight change   HEENT: [ ] Blurred vision [ ] Eye Pain [ ] Headache [ ] Runny nose [ ] Sore Throat   Respiratory: [ ] Cough [ ] Wheezing [ ] Shortness of breath  Cardiovascular: [ ] Chest Pain [ ] Palpitations [ ] ZEE [ ] PND [ ] Orthopnea  Gastrointestinal: [ ] Abdominal Pain [ ] Diarrhea [ ] Constipation [ ] Hemorrhoids [ ] Nausea [ ] Vomiting  Genitourinary: [ ] Nocturia [ ] Dysuria [ ] Incontinence  Extremities: [ ] Swelling [ ] Joint Pain  Neurologic: [ ] Focal deficit [ ] Paresthesias [ ] Syncope  Lymphatic: [ ] Swelling [ ] Lymphadenopathy   Skin: [ ] Rash [ ] Ecchymoses [ ] Wounds [ ] Lesions  Psychiatry: [ ] Depression [ ] Suicidal/Homicidal Ideation [ ] Anxiety [ ] Sleep Disturbances  [ ] 10 point review of systems is otherwise negative except as mentioned above            [ ]Unable to obtain    Vitals:  T(C): 36.3 (21 @ 04:47), Max: 36.5 (21 @ 19:59)  HR: 87 (21 @ 04:47) (79 - 87)  BP: 114/63 (21 @ 04:47) (100/62 - 114/63)  BP(mean): --  RR: 20 (21 @ 04:47) (19 - 20)  SpO2: 99% (21 @ 04:47) (98% - 100%)  Wt(kg): --  Daily     Daily Weight in k.8 (2021 06:56)  I&O's Summary    2021 07:01  -  2021 07:00  --------------------------------------------------------  IN: 606 mL / OUT: 1700 mL / NET: -1094 mL        Physical Exam:  General: No distress. Comfortable.  HEENT: EOM intact.  Neck: Neck supple. JVP mildly elevated.  Chest: Clear to auscultation bilaterally  CV: RRR. Normal S1 and S2. No murmurs, rub, or gallops. Radial pulses normal.  Abdomen: Soft, non-distended, non-tender  Skin: No rashes or skin breakdown  Extremities: Warm, no edema  Neurology: Alert and oriented times three. Sensation intact  Psych: Affect normal        Labs:                        10.4   12.14 )-----------( 514      ( 2021 05:58 )             33.6     -    138  |  99  |  106<H>  ----------------------------<  207<H>  4.3   |  21<L>  |  2.18<H>    Ca    9.6      2021 05:58  Phos  4.7       Mg     2.6                         New results/imaging:

## 2021-01-24 NOTE — PROGRESS NOTE ADULT - SUBJECTIVE AND OBJECTIVE BOX
Dr. Muhammad  Office [phone]  Cell [phone]  [facility]  Cell [phone]    RENAL PROGRESS NOTE: DATE OF SERVICE 01-24-21 @ 14:02    Patient is a 75y old  Female who presents with a chief complaint of increased work of breathing (24 Jan 2021 09:44)      Patient seen and examined at bedside. No chest pain/sob    VITALS:  T(F): 97.8 (01-24-21 @ 10:52), Max: 97.8 (01-24-21 @ 10:52)  HR: 89 (01-24-21 @ 10:52)  BP: 115/57 (01-24-21 @ 10:52)  RR: 20 (01-24-21 @ 10:52)  SpO2: 97% (01-24-21 @ 10:52)  Wt(kg): --    01-23 @ 07:01  - 01-24 @ 07:00  --------------------------------------------------------  IN: 606 mL / OUT: 1700 mL / NET: -1094 mL    01-24 @ 07:01 - 01-24 @ 14:02  --------------------------------------------------------  IN: 480 mL / OUT: 600 mL / NET: -120 mL          PHYSICAL EXAM:  Constitutional: NAD  Neck: No JVD  Respiratory: Bilateral few basal crackles+  Cardiovascular: S1, S2, RRR  Gastrointestinal: BS+, soft, NT/ND  Extremities: No peripheral edema    Hospital Medications:   MEDICATIONS  (STANDING):  aspirin enteric coated 81 milliGRAM(s) Oral daily  atorvastatin 80 milliGRAM(s) Oral at bedtime  buMETAnide Infusion 0.5 mG/Hr (2.5 mL/Hr) IV Continuous <Continuous>  calamine/zinc oxide Lotion 1 Application(s) Topical three times a day  clobetasol 0.05% Cream 1 Application(s) Topical every 12 hours  clopidogrel Tablet 75 milliGRAM(s) Oral daily  dextrose 40% Gel 15 Gram(s) Oral once  dextrose 5%. 1000 milliLiter(s) (50 mL/Hr) IV Continuous <Continuous>  dextrose 5%. 1000 milliLiter(s) (100 mL/Hr) IV Continuous <Continuous>  dextrose 50% Injectable 25 Gram(s) IV Push once  dextrose 50% Injectable 12.5 Gram(s) IV Push once  dextrose 50% Injectable 25 Gram(s) IV Push once  glucagon  Injectable 1 milliGRAM(s) IntraMuscular once  heparin   Injectable 5000 Unit(s) SubCutaneous every 8 hours  hydrALAZINE 25 milliGRAM(s) Oral every 8 hours  insulin glargine Injectable (LANTUS) 56 Unit(s) SubCutaneous at bedtime  insulin lispro (ADMELOG) corrective regimen sliding scale   SubCutaneous three times a day before meals  insulin lispro (ADMELOG) corrective regimen sliding scale   SubCutaneous at bedtime  insulin lispro Injectable (ADMELOG) 39 Unit(s) SubCutaneous three times a day before meals  levothyroxine Injectable 25 MICROGram(s) IV Push at bedtime  melatonin 3 milliGRAM(s) Oral at bedtime  metoprolol succinate  milliGRAM(s) Oral daily  milrinone Infusion 0.25 MICROgram(s)/kG/Min (3.74 mL/Hr) IV Continuous <Continuous>  senna 2 Tablet(s) Oral at bedtime      LABS:  01-24    138  |  99  |  106<H>  ----------------------------<  207<H>  4.3   |  21<L>  |  2.18<H>    Ca    9.6      24 Jan 2021 05:58  Phos  4.7     01-24  Mg     2.6     01-24      Creatinine Trend: 2.18 <--, 2.31 <--, 2.40 <--, 2.15 <--, 2.10 <--, 2.31 <--, 2.47 <--, 2.54 <--, 2.39 <--, 2.22 <--    Phosphorus Level, Serum: 4.7 mg/dL (01-24 @ 05:58)                              10.4   12.14 )-----------( 514      ( 24 Jan 2021 05:58 )             33.6     Urine Studies:  Urinalysis - [01-22-21 @ 22:32]      Color Light Yellow / Appearance Clear / SG 1.011 / pH 5.5      Gluc Trace / Ketone Negative  / Bili Negative / Urobili Negative       Blood Negative / Protein 30 mg/dL / Leuk Est Large / Nitrite Negative      RBC 2 / WBC 35 / Hyaline 1 / Gran  / Sq Epi  / Non Sq Epi 2 / Bacteria Negative      Ferritin 111      [01-13-21 @ 01:42]  HbA1c 7.5      [10-08-19 @ 14:30]  TSH 2.15      [01-13-21 @ 10:07]        RADIOLOGY & ADDITIONAL STUDIES:

## 2021-01-24 NOTE — PROGRESS NOTE ADULT - ASSESSMENT
Assessment  DMT2: 75y Female with DM T2 with hyperglycemia, A1C 7.1%, was on insulin at home, now on basal bolus insulin, dose  adjusted by primary team, blood sugars still not at target, no hypoglycemic episodes, eating meals.  HF: on medications, stable, monitored, FU Cardiology.  Hypothyroidism: On Synthroid 50 mcg po daily, compliant with Synthroid intake, asymptomatic, euthyroid.  CKD: Monitor labs/BMP      Jillian Banks MD  Cell: [phone]  Office: [phone]

## 2021-01-24 NOTE — PROGRESS NOTE ADULT - ATTENDING COMMENTS
Vitals, labs, chart reviewed. pt seen earlier this am. Pt reports that generalized fatigue has improved since yest. No cp/sob/dysuria. no f/c/r. PE: NAD; euvolemic; non-focal; abd benign    Acute on chronic systolic heart failure- Bumex held yest and restarted today per HF team. cont Milrinone per cards    Pyuria- unclear if real UTI as pt is not symptomatic. leukocytosis has been fluctuating and may be result of changing volume status. f/u urine cx. would hold abx for now.    CKD 4- monitor creat. f/u renal recs    DM 2, uncontrolled- lantus slightly increased. monitor fs    Although patient did appear better today, would hold on further xanax given age. if concern for underlying anxiety. would call psych eval

## 2021-01-24 NOTE — PROGRESS NOTE ADULT - ATTENDING COMMENTS
seen and agree w/ PA.  tolerating milrinone and BB, HR is in the 80s.  anticipate this will go slower if milrinone can be weaned off.  would like to hold off on diuresing another day, and let her BUN/Cr recover.  consider repeat PA/Lateral CXR re: tachypnea today.  given her abnormal kidney function and rate-dependent MV gradient, she may need indwelling PA catheter to manage our next steps with drug titration.

## 2021-01-25 LAB
ANION GAP SERPL CALC-SCNC: 16 MMOL/L — SIGNIFICANT CHANGE UP (ref 5–17)
ANION GAP SERPL CALC-SCNC: 20 MMOL/L — HIGH (ref 5–17)
BUN SERPL-MCNC: 119 MG/DL — HIGH (ref 7–23)
BUN SERPL-MCNC: 122 MG/DL — HIGH (ref 7–23)
CALCIUM SERPL-MCNC: 9.4 MG/DL — SIGNIFICANT CHANGE UP (ref 8.4–10.5)
CALCIUM SERPL-MCNC: 9.5 MG/DL — SIGNIFICANT CHANGE UP (ref 8.4–10.5)
CHLORIDE SERPL-SCNC: 95 MMOL/L — LOW (ref 96–108)
CHLORIDE SERPL-SCNC: 97 MMOL/L — SIGNIFICANT CHANGE UP (ref 96–108)
CO2 SERPL-SCNC: 20 MMOL/L — LOW (ref 22–31)
CO2 SERPL-SCNC: 23 MMOL/L — SIGNIFICANT CHANGE UP (ref 22–31)
CREAT SERPL-MCNC: 2.32 MG/DL — HIGH (ref 0.5–1.3)
CREAT SERPL-MCNC: 2.41 MG/DL — HIGH (ref 0.5–1.3)
GLUCOSE BLDC GLUCOMTR-MCNC: 119 MG/DL — HIGH (ref 70–99)
GLUCOSE BLDC GLUCOMTR-MCNC: 133 MG/DL — HIGH (ref 70–99)
GLUCOSE BLDC GLUCOMTR-MCNC: 147 MG/DL — HIGH (ref 70–99)
GLUCOSE BLDC GLUCOMTR-MCNC: 229 MG/DL — HIGH (ref 70–99)
GLUCOSE BLDC GLUCOMTR-MCNC: 239 MG/DL — HIGH (ref 70–99)
GLUCOSE SERPL-MCNC: 114 MG/DL — HIGH (ref 70–99)
GLUCOSE SERPL-MCNC: 191 MG/DL — HIGH (ref 70–99)
HCT VFR BLD CALC: 32.4 % — LOW (ref 34.5–45)
HGB BLD-MCNC: 10.1 G/DL — LOW (ref 11.5–15.5)
MAGNESIUM SERPL-MCNC: 2.4 MG/DL — SIGNIFICANT CHANGE UP (ref 1.6–2.6)
MCHC RBC-ENTMCNC: 25.9 PG — LOW (ref 27–34)
MCHC RBC-ENTMCNC: 31.2 GM/DL — LOW (ref 32–36)
MCV RBC AUTO: 83.1 FL — SIGNIFICANT CHANGE UP (ref 80–100)
NRBC # BLD: 0 /100 WBCS — SIGNIFICANT CHANGE UP (ref 0–0)
PHOSPHATE SERPL-MCNC: 5.1 MG/DL — HIGH (ref 2.5–4.5)
PLATELET # BLD AUTO: 484 K/UL — HIGH (ref 150–400)
POTASSIUM SERPL-MCNC: 3.4 MMOL/L — LOW (ref 3.5–5.3)
POTASSIUM SERPL-MCNC: 3.6 MMOL/L — SIGNIFICANT CHANGE UP (ref 3.5–5.3)
POTASSIUM SERPL-SCNC: 3.4 MMOL/L — LOW (ref 3.5–5.3)
POTASSIUM SERPL-SCNC: 3.6 MMOL/L — SIGNIFICANT CHANGE UP (ref 3.5–5.3)
RBC # BLD: 3.9 M/UL — SIGNIFICANT CHANGE UP (ref 3.8–5.2)
RBC # FLD: 16.1 % — HIGH (ref 10.3–14.5)
SODIUM SERPL-SCNC: 135 MMOL/L — SIGNIFICANT CHANGE UP (ref 135–145)
SODIUM SERPL-SCNC: 136 MMOL/L — SIGNIFICANT CHANGE UP (ref 135–145)
WBC # BLD: 9.65 K/UL — SIGNIFICANT CHANGE UP (ref 3.8–10.5)
WBC # FLD AUTO: 9.65 K/UL — SIGNIFICANT CHANGE UP (ref 3.8–10.5)

## 2021-01-25 PROCEDURE — 99233 SBSQ HOSP IP/OBS HIGH 50: CPT

## 2021-01-25 PROCEDURE — 99232 SBSQ HOSP IP/OBS MODERATE 35: CPT | Mod: GC

## 2021-01-25 PROCEDURE — 71250 CT THORAX DX C-: CPT | Mod: 26

## 2021-01-25 RX ORDER — POTASSIUM CHLORIDE 20 MEQ
20 PACKET (EA) ORAL
Refills: 0 | Status: COMPLETED | OUTPATIENT
Start: 2021-01-25 | End: 2021-01-26

## 2021-01-25 RX ORDER — MILRINONE LACTATE 1 MG/ML
0.12 INJECTION, SOLUTION INTRAVENOUS
Qty: 20 | Refills: 0 | Status: DISCONTINUED | OUTPATIENT
Start: 2021-01-25 | End: 2021-01-26

## 2021-01-25 RX ORDER — INSULIN LISPRO 100/ML
30 VIAL (ML) SUBCUTANEOUS ONCE
Refills: 0 | Status: COMPLETED | OUTPATIENT
Start: 2021-01-25 | End: 2021-01-25

## 2021-01-25 RX ORDER — INSULIN LISPRO 100/ML
30 VIAL (ML) SUBCUTANEOUS
Refills: 0 | Status: DISCONTINUED | OUTPATIENT
Start: 2021-01-25 | End: 2021-01-27

## 2021-01-25 RX ADMIN — Medication 0.25 MILLIGRAM(S): at 04:49

## 2021-01-25 RX ADMIN — MILRINONE LACTATE 1.99 MICROGRAM(S)/KG/MIN: 1 INJECTION, SOLUTION INTRAVENOUS at 13:54

## 2021-01-25 RX ADMIN — Medication 25 MILLIGRAM(S): at 13:11

## 2021-01-25 RX ADMIN — Medication 30 UNIT(S): at 11:52

## 2021-01-25 RX ADMIN — Medication 30 UNIT(S): at 08:34

## 2021-01-25 RX ADMIN — Medication 25 MICROGRAM(S): at 22:17

## 2021-01-25 RX ADMIN — BUMETANIDE 2.5 MG/HR: 0.25 INJECTION INTRAMUSCULAR; INTRAVENOUS at 04:57

## 2021-01-25 RX ADMIN — Medication 1 APPLICATION(S): at 04:50

## 2021-01-25 RX ADMIN — Medication 25 MILLIGRAM(S): at 22:17

## 2021-01-25 RX ADMIN — Medication 100 MILLIGRAM(S): at 04:49

## 2021-01-25 RX ADMIN — Medication 25 MILLIGRAM(S): at 04:49

## 2021-01-25 RX ADMIN — Medication 81 MILLIGRAM(S): at 11:52

## 2021-01-25 RX ADMIN — INSULIN GLARGINE 56 UNIT(S): 100 INJECTION, SOLUTION SUBCUTANEOUS at 07:50

## 2021-01-25 RX ADMIN — ATORVASTATIN CALCIUM 80 MILLIGRAM(S): 80 TABLET, FILM COATED ORAL at 22:17

## 2021-01-25 RX ADMIN — CALAMINE AND ZINC OXIDE AND PHENOL 1 APPLICATION(S): 160; 10 LOTION TOPICAL at 13:10

## 2021-01-25 RX ADMIN — INSULIN GLARGINE 56 UNIT(S): 100 INJECTION, SOLUTION SUBCUTANEOUS at 22:18

## 2021-01-25 RX ADMIN — CALAMINE AND ZINC OXIDE AND PHENOL 1 APPLICATION(S): 160; 10 LOTION TOPICAL at 04:50

## 2021-01-25 RX ADMIN — Medication 0: at 22:15

## 2021-01-25 RX ADMIN — HEPARIN SODIUM 5000 UNIT(S): 5000 INJECTION INTRAVENOUS; SUBCUTANEOUS at 22:16

## 2021-01-25 RX ADMIN — HEPARIN SODIUM 5000 UNIT(S): 5000 INJECTION INTRAVENOUS; SUBCUTANEOUS at 13:10

## 2021-01-25 RX ADMIN — MILRINONE LACTATE 3.74 MICROGRAM(S)/KG/MIN: 1 INJECTION, SOLUTION INTRAVENOUS at 04:57

## 2021-01-25 RX ADMIN — CLOPIDOGREL BISULFATE 75 MILLIGRAM(S): 75 TABLET, FILM COATED ORAL at 11:52

## 2021-01-25 RX ADMIN — HEPARIN SODIUM 5000 UNIT(S): 5000 INJECTION INTRAVENOUS; SUBCUTANEOUS at 04:49

## 2021-01-25 RX ADMIN — Medication 3 MILLIGRAM(S): at 22:17

## 2021-01-25 RX ADMIN — Medication 1 APPLICATION(S): at 17:14

## 2021-01-25 RX ADMIN — SENNA PLUS 2 TABLET(S): 8.6 TABLET ORAL at 22:17

## 2021-01-25 RX ADMIN — CALAMINE AND ZINC OXIDE AND PHENOL 1 APPLICATION(S): 160; 10 LOTION TOPICAL at 22:14

## 2021-01-25 RX ADMIN — Medication 30 UNIT(S): at 17:14

## 2021-01-25 NOTE — PROGRESS NOTE ADULT - ASSESSMENT
75y.o F Danish speaking h/o HTN, HLD, DM, CAD s/p CABG 2014 and prior PCI, severe mitral regurgitation (s/p mitral clip 2019), pulmonary HTN (TTE 2019), HF (EF 21 % June 2019), CKD IV not on dialysis (b/l SCr 2-2.2), hypothyroidism BIBEMS 2/2 worsening work of breathing, found to have crackles, elevated proBNP and b/l pulmonary edema on CXR c/w acute exacerbation of CHF.  75y.o F [place] speaking h/o HTN, HLD, DM, CAD s/p CABG 2014 and prior PCI, severe mitral regurgitation (s/p mitral clip 2019), pulmonary HTN (TTE 2019), HF (EF 21 % June 2019), CKD IV not on dialysis (b/l SCr 2-2.2), hypothyroidism admitted for hypoxic respiratory failure 2/2 acute on chronic HFrEF exacerbation in the setting of mitral stenosis c/b MERVIN on CKD.  Pt initially treated w/ IV diuresis but was started on milrinone drip after RHC showed persistent elevated filled pressures. Now s/p  IV diuresis, milrinone dosage decreased but is w/ continued dyspnea despite clinical euvolemic status.  PRN xanax started for possible anxiety component.  Labs w/ intermittent elevations in WBC in the setting of asymptomatic bacteruria - continuing to monitor off abx.

## 2021-01-25 NOTE — PROGRESS NOTE ADULT - ATTENDING COMMENTS
75F w/ PMHx of severe ICM (EF 20%), severe MR s/p mitraclip, HTN, HLD, DM2, CKD b/l CR~2 p/w sob due to acute on chronic chf. Intermittently on o2, but weight is overall down.    #acute on chronic HFrEF  -ECG NSR, pvcs, RA deviation, diffuse TWIs and ST deppressions in precordial leads  -tolerating BB  -wean off bumex gtt today  -milrinone per CHF - wean dose  -tele  -cards consult appreciated  -trend LFTs daily  -strict is/os and daily standing weights    #DM2  -titrate insulin prn - lantus bid  -endo following    #rash  -diffuse erythematous, raised pruritic papules  ?severe eczema  -start steroid cream, hydroxycine prn, permetherin per derm  -improving    #decreased LLE pulses - vasc doppler negative  improving, likely approaching euvolemia - weight stable for last 3 days  as she is back on o2, will obtain ct chest to r/o another cause  suspect part of her tachypnea is anxiety driven    Tor Newberry MD  Division of Hospital Medicine  Pager: [phone]  Office: [phone]

## 2021-01-25 NOTE — PROGRESS NOTE ADULT - SUBJECTIVE AND OBJECTIVE BOX
Chief complaint  Patient is a 75y old  Female who presents with a chief complaint of increased work of breathing (25 Jan 2021 13:12)   Review of systems  Patient in bed, looks comfortable, no hypoglycemic episodes.    Labs and Fingersticks  CAPILLARY BLOOD GLUCOSE      POCT Blood Glucose.: 133 mg/dL (25 Jan 2021 11:30)  POCT Blood Glucose.: 239 mg/dL (25 Jan 2021 08:30)  POCT Blood Glucose.: 119 mg/dL (25 Jan 2021 07:21)  POCT Blood Glucose.: 260 mg/dL (24 Jan 2021 21:35)  POCT Blood Glucose.: 253 mg/dL (24 Jan 2021 16:15)      Anion Gap, Serum: 16 (01-25 @ 05:56)  Anion Gap, Serum: 17 (01-24 @ 19:57)  Anion Gap, Serum: 18 <H> (01-24 @ 05:58)  Anion Gap, Serum: 16 (01-23 @ 15:55)      Calcium, Total Serum: 9.5 (01-25 @ 05:56)  Calcium, Total Serum: 9.0 (01-24 @ 19:57)  Calcium, Total Serum: 9.6 (01-24 @ 05:58)  Calcium, Total Serum: 9.6 (01-23 @ 15:55)          01-25    136  |  97  |  119<H>  ----------------------------<  114<H>  3.6   |  23  |  2.32<H>    Ca    9.5      25 Jan 2021 05:56  Phos  5.1     01-25  Mg     2.4     01-25                          10.1   9.65  )-----------( 484      ( 25 Jan 2021 05:56 )             32.4     Medications  MEDICATIONS  (STANDING):  aspirin enteric coated 81 milliGRAM(s) Oral daily  atorvastatin 80 milliGRAM(s) Oral at bedtime  calamine/zinc oxide Lotion 1 Application(s) Topical three times a day  clobetasol 0.05% Cream 1 Application(s) Topical every 12 hours  clopidogrel Tablet 75 milliGRAM(s) Oral daily  dextrose 40% Gel 15 Gram(s) Oral once  dextrose 5%. 1000 milliLiter(s) (50 mL/Hr) IV Continuous <Continuous>  dextrose 5%. 1000 milliLiter(s) (100 mL/Hr) IV Continuous <Continuous>  dextrose 50% Injectable 25 Gram(s) IV Push once  dextrose 50% Injectable 12.5 Gram(s) IV Push once  dextrose 50% Injectable 25 Gram(s) IV Push once  glucagon  Injectable 1 milliGRAM(s) IntraMuscular once  heparin   Injectable 5000 Unit(s) SubCutaneous every 8 hours  hydrALAZINE 25 milliGRAM(s) Oral every 8 hours  insulin glargine Injectable (LANTUS) 56 Unit(s) SubCutaneous at bedtime  insulin glargine Injectable (LANTUS) 56 Unit(s) SubCutaneous every morning  insulin lispro (ADMELOG) corrective regimen sliding scale   SubCutaneous three times a day before meals  insulin lispro (ADMELOG) corrective regimen sliding scale   SubCutaneous at bedtime  insulin lispro Injectable (ADMELOG) 30 Unit(s) SubCutaneous three times a day before meals  levothyroxine Injectable 25 MICROGram(s) IV Push at bedtime  melatonin 3 milliGRAM(s) Oral at bedtime  metoprolol succinate  milliGRAM(s) Oral daily  milrinone Infusion 0.125 MICROgram(s)/kG/Min (1.99 mL/Hr) IV Continuous <Continuous>  senna 2 Tablet(s) Oral at bedtime      Physical Exam  Culture - Urine (collected 01-24-21 @ 16:55)  Source: .Urine Clean Catch (Midstream)  Preliminary Report (01-25-21 @ 13:36):    10,000 - 49,000 CFU/mL Escherichia coli      General: Patient comfortable in bed  Vital Signs Last 12 Hrs  T(F): 97.3 (01-25-21 @ 11:12), Max: 97.3 (01-25-21 @ 04:20)  HR: 88 (01-25-21 @ 13:11) (80 - 88)  BP: 100/58 (01-25-21 @ 13:11) (95/56 - 102/58)  BP(mean): --  RR: 19 (01-25-21 @ 11:12) (18 - 19)  SpO2: 99% (01-25-21 @ 11:12) (99% - 100%)  Neck: No palpable thyroid nodules.  CVS: S1S2, No murmurs  Respiratory: No wheezing, no crepitations  GI: Abdomen soft, bowel sounds positive  Musculoskeletal:  edema lower extremities.   Skin: No skin rashes, no ecchymosis    Diagnostics             Chief complaint  Patient is a 75y old  Female who presents with a chief complaint of increased work of breathing (25 Jan 2021 13:12)   Review of systems  Patient in bed, looks comfortable, no hypoglycemic episodes.    Labs and Fingersticks  CAPILLARY BLOOD GLUCOSE      POCT Blood Glucose.: 133 mg/dL (25 Jan 2021 11:30)  POCT Blood Glucose.: 239 mg/dL (25 Jan 2021 08:30)  POCT Blood Glucose.: 119 mg/dL (25 Jan 2021 07:21)  POCT Blood Glucose.: 260 mg/dL (24 Jan 2021 21:35)  POCT Blood Glucose.: 253 mg/dL (24 Jan 2021 16:15)      Anion Gap, Serum: 16 (01-25 @ 05:56)  Anion Gap, Serum: 17 (01-24 @ 19:57)  Anion Gap, Serum: 18 <H> (01-24 @ 05:58)  Anion Gap, Serum: 16 (01-23 @ 15:55)      Calcium, Total Serum: 9.5 (01-25 @ 05:56)  Calcium, Total Serum: 9.0 (01-24 @ 19:57)  Calcium, Total Serum: 9.6 (01-24 @ 05:58)  Calcium, Total Serum: 9.6 (01-23 @ 15:55)          01-25    136  |  97  |  119<H>  ----------------------------<  114<H>  3.6   |  23  |  2.32<H>    Ca    9.5      25 Jan 2021 05:56  Phos  5.1     01-25  Mg     2.4     01-25                          10.1   9.65  )-----------( 484      ( 25 Jan 2021 05:56 )             32.4     Medications  MEDICATIONS  (STANDING):  aspirin enteric coated 81 milliGRAM(s) Oral daily  atorvastatin 80 milliGRAM(s) Oral at bedtime  calamine/zinc oxide Lotion 1 Application(s) Topical three times a day  clobetasol 0.05% Cream 1 Application(s) Topical every 12 hours  clopidogrel Tablet 75 milliGRAM(s) Oral daily  dextrose 40% Gel 15 Gram(s) Oral once  dextrose 5%. 1000 milliLiter(s) (50 mL/Hr) IV Continuous <Continuous>  dextrose 5%. 1000 milliLiter(s) (100 mL/Hr) IV Continuous <Continuous>  dextrose 50% Injectable 25 Gram(s) IV Push once  dextrose 50% Injectable 12.5 Gram(s) IV Push once  dextrose 50% Injectable 25 Gram(s) IV Push once  glucagon  Injectable 1 milliGRAM(s) IntraMuscular once  heparin   Injectable 5000 Unit(s) SubCutaneous every 8 hours  hydrALAZINE 25 milliGRAM(s) Oral every 8 hours  insulin glargine Injectable (LANTUS) 56 Unit(s) SubCutaneous at bedtime  insulin glargine Injectable (LANTUS) 56 Unit(s) SubCutaneous every morning  insulin lispro (ADMELOG) corrective regimen sliding scale   SubCutaneous three times a day before meals  insulin lispro (ADMELOG) corrective regimen sliding scale   SubCutaneous at bedtime  insulin lispro Injectable (ADMELOG) 30 Unit(s) SubCutaneous three times a day before meals  levothyroxine Injectable 25 MICROGram(s) IV Push at bedtime  melatonin 3 milliGRAM(s) Oral at bedtime  metoprolol succinate  milliGRAM(s) Oral daily  milrinone Infusion 0.125 MICROgram(s)/kG/Min (1.99 mL/Hr) IV Continuous <Continuous>  senna 2 Tablet(s) Oral at bedtime      Physical Exam  Culture - Urine (collected 01-24-21 @ 16:55)  Source: .Urine Clean Catch (Midstream)  Preliminary Report (01-25-21 @ 13:36):    10,000 - 49,000 CFU/mL Escherichia coli      General: Patient comfortable in bed  Vital Signs Last 12 Hrs  T(F): 97.3 (01-25-21 @ 11:12), Max: 97.3 (01-25-21 @ 04:20)  HR: 88 (01-25-21 @ 13:11) (80 - 88)  BP: 100/58 (01-25-21 @ 13:11) (95/56 - 102/58)  BP(mean): --  RR: 19 (01-25-21 @ 11:12) (18 - 19)  SpO2: 99% (01-25-21 @ 11:12) (99% - 100%)  Neck: No palpable thyroid nodules.  CVS: S1S2, No murmurs  Respiratory: No wheezing, no crepitations  GI: Abdomen soft, bowel sounds positive  Musculoskeletal:  edema lower extremities.   Skin: No skin rashes, no ecchymosis    Diagnostics

## 2021-01-25 NOTE — PROGRESS NOTE ADULT - SUBJECTIVE AND OBJECTIVE BOX
S: Resting comfortably, laying relatively flat. Denies chest pain or SOB. Review of systems otherwise (-)    Review of Systems:   Constitutional: [ ] fevers, [ ] chills.   Skin: [ ] dry skin. [ ] rashes.  Psychiatric: [ ] depression, [ ] anxiety.   Gastrointestinal: [ ] BRBPR, [ ] melena.   Neurological: [ ] confusion. [ ] seizures. [ ] shuffling gait.   Ears,Nose,Mouth and Throat: [ ] ear pain [ ] sore throat.   Eyes: [ ] diplopia.   Respiratory: [ ] hemoptysis. [ ] shortness of breath  Cardiovascular: See HPI above  Hematologic/Lymphatic: [ ] anemia. [ ] painful nodes. [ ] prolonged bleeding.   Genitourinary: [ ] hematuria. [ ] flank pain.   Endocrine: [ ] significant change in weight. [ ] intolerance to heat and cold.     Review of systems [ x] otherwise negative, [ ] otherwise unable to obtain    FH: no family history of sudden cardiac death in first degree relatives    SH: [ ] tobacco, [ ] alcohol, [ ] drugs       MEDICATIONS  (STANDING):  aspirin enteric coated 81 milliGRAM(s) Oral daily  atorvastatin 80 milliGRAM(s) Oral at bedtime  buMETAnide Infusion 0.5 mG/Hr (2.5 mL/Hr) IV Continuous <Continuous>  calamine/zinc oxide Lotion 1 Application(s) Topical three times a day  clobetasol 0.05% Cream 1 Application(s) Topical every 12 hours  clopidogrel Tablet 75 milliGRAM(s) Oral daily  dextrose 40% Gel 15 Gram(s) Oral once  dextrose 5%. 1000 milliLiter(s) (50 mL/Hr) IV Continuous <Continuous>  dextrose 5%. 1000 milliLiter(s) (100 mL/Hr) IV Continuous <Continuous>  dextrose 50% Injectable 25 Gram(s) IV Push once  dextrose 50% Injectable 12.5 Gram(s) IV Push once  dextrose 50% Injectable 25 Gram(s) IV Push once  glucagon  Injectable 1 milliGRAM(s) IntraMuscular once  heparin   Injectable 5000 Unit(s) SubCutaneous every 8 hours  hydrALAZINE 25 milliGRAM(s) Oral every 8 hours  insulin glargine Injectable (LANTUS) 56 Unit(s) SubCutaneous at bedtime  insulin glargine Injectable (LANTUS) 56 Unit(s) SubCutaneous every morning  insulin lispro (ADMELOG) corrective regimen sliding scale   SubCutaneous three times a day before meals  insulin lispro (ADMELOG) corrective regimen sliding scale   SubCutaneous at bedtime  insulin lispro Injectable (ADMELOG) 30 Unit(s) SubCutaneous three times a day before meals  levothyroxine Injectable 25 MICROGram(s) IV Push at bedtime  melatonin 3 milliGRAM(s) Oral at bedtime  metoprolol succinate  milliGRAM(s) Oral daily  milrinone Infusion 0.25 MICROgram(s)/kG/Min (3.74 mL/Hr) IV Continuous <Continuous>  senna 2 Tablet(s) Oral at bedtime    MEDICATIONS  (PRN):  ALPRAZolam 0.25 milliGRAM(s) Oral every 8 hours PRN anxiety  polyethylene glycol 3350 17 Gram(s) Oral two times a day PRN Constipation      LABS:                          10.1   9.65  )-----------( 484      ( 25 Jan 2021 05:56 )             32.4     Hemoglobin: 10.1 g/dL (01-25 @ 05:56)  Hemoglobin: 10.4 g/dL (01-24 @ 05:58)  Hemoglobin: 9.8 g/dL (01-23 @ 04:07)  Hemoglobin: 10.3 g/dL (01-22 @ 05:35)  Hemoglobin: 10.1 g/dL (01-21 @ 05:59)    01-25    136  |  97  |  119<H>  ----------------------------<  114<H>  3.6   |  23  |  2.32<H>    Ca    9.5      25 Jan 2021 05:56  Phos  5.1     01-25  Mg     2.4     01-25      Creatinine Trend: 2.32<--, 2.08<--, 2.18<--, 2.31<--, 2.40<--, 2.15<--       01-24-21 @ 07:01  -  01-25-21 @ 07:00  --------------------------------------------------------  IN: 1044.4 mL / OUT: 1440 mL / NET: -395.6 mL    01-25-21 @ 07:01  -  01-25-21 @ 13:01  --------------------------------------------------------  IN: 100 mL / OUT: 0 mL / NET: 100 mL        PHYSICAL EXAM  Vital Signs Last 24 Hrs  T(C): 36.3 (25 Jan 2021 11:12), Max: 36.6 (24 Jan 2021 20:11)  T(F): 97.3 (25 Jan 2021 11:12), Max: 97.9 (24 Jan 2021 20:11)  HR: 80 (25 Jan 2021 11:12) (80 - 86)  BP: 95/56 (25 Jan 2021 11:12) (95/56 - 111/59)  BP(mean): --  RR: 19 (25 Jan 2021 11:12) (18 - 20)  SpO2: 99% (25 Jan 2021 11:12) (97% - 100%)      General: Well nourished in no acute distress. Alert and Oriented * 3.   Head: Normocephalic and atraumatic.   Neck: No JVD. No bruits. Supple. Does not appear to be enlarged.   Cardiovascular: + S1,S2 ; RRR Soft systolic murmur at the left lower sternal border. No rubs noted.    Lungs: CTA b/l. No rhonchi, rales or wheezes.   Abdomen: + BS, soft. Non tender. Non distended. No rebound. No guarding.   Extremities: No clubbing/cyanosis/edema.   Neurologic: Moves all four extremities. Full range of motion.   Skin: Warm and moist. The patient's skin has normal elasticity and good skin turgor.   Psychiatric: Appropriate mood and affect.  Musculoskeletal: Normal range of motion, normal strength    TELEMETRY: SR 80-90    < from: Cardiac Cath Lab - Adult (01.20.21 @ 10:22) >  PROCEDURE:  --  Right heart catheterization.  --  Sonosite - Diagnostic.  COMPLICATIONS: There were no complications.  DIAGNOSTIC RECOMMENDATIONS: The patient should continuewith the present  medications.  Prepared and signed by  Cesar Jackson, M.D.       A/P) 74 y/o female PMH HTN, HLD, DM, CAD s/p CABG 2014 and prior PCI, severe mitral regurgitation (s/p mitral clip 2019), pulmonary HTN, HF (EF 21 % June 2019), CKD IV not on dialysis (b/l SCr 2-2.2), hypothyroidism a/w acute on chronic systolic CHF with NYHA IV functional status.    -repeat echo noted but didn't reassess degree of mitral stenosis  -Continue dapt/statin   -RHC noted above - Filling pressures are appropriate considering LV function and the mitral valve gradient  -would transition to PO bumex  -trend creatinine - worsening today  -very poor candidate for a primary prevention ICD  -f/u with Dr. Sotelo on Friday 1/29 at 12 PM     Amauri Hill PA-C  Pager: [phone]

## 2021-01-25 NOTE — PROGRESS NOTE ADULT - PROBLEM SELECTOR PLAN 3
- relatively stable although with elvated BUN/Cr, baseline Cr ~1.9-2.2  - continue to monitor off diuretics

## 2021-01-25 NOTE — PROGRESS NOTE ADULT - PROBLEM SELECTOR PLAN 5
-Takes 60 units of lantus at bedtime and 18 units of humalog   - increased Lantus and Admelog based on sliding scale.  Will continue to titrate to achieve BS goal 140-180.  Increased insulin dosages on 1/18, 1/19.  - consulted endocrinology for further recommendations - recommended increasing lantus to 65 however AM sugars still heavily elevated.  Started BID Lantus dosing 1/21 -Takes 60 units of lantus at bedtime and 18 units of humalog   - increased Lantus and Admelog based on sliding scale.  Will continue to titrate to achieve BS goal 140-180  - Endo consulted.  Started BID Lantus dosing 1/21  - finger sticks improving

## 2021-01-25 NOTE — PROGRESS NOTE ADULT - PROBLEM SELECTOR PLAN 1
- discontinue bumex gtt. Hold on further diuresis at this time. Will reassess tomorrow.  - Decrease milrinone to 0.125 mcg/kg/min  - continue with HDZN 25 mg q8h  - continue with Toprol  mg daily; HR goal 60-70  - recommend noncontrast chest CT to further evaluate SOB and to evaluate for pleural effusions  - daily standing weight and strict I&Os

## 2021-01-25 NOTE — PROGRESS NOTE ADULT - ATTENDING COMMENTS
Patient seen and examined, agree with above assessment and plan as transcribed above.    - Mean MV gradient 8-9 mmHg, wedge of 19, so LVEDP is approximately 10 mmHg which is likely optimal.  - Would favor D/C IV bumex  - cardiac Index 2.4 d/C milrinone as it may increase HR and subsequently increase MV gradient  - d/w CHF team    Vargas Adame MD, [facility]  BEEPER [phone]

## 2021-01-25 NOTE — PROGRESS NOTE ADULT - ASSESSMENT
Briefly, 76 yo F with HTN, HLD, DM2 (A1c 7.5 10/19), CAD s/p CABG (LIMA to LAD, SVG to OM, SVG to PDA) and prior PCI, ICM HFrEF (EF 20-25%, LVEDD 4.7 cm), severe mitral regurgitation s/p mitral clip (6/19; mean MV gradient 7-8), severe pulmonary HTN, CKD IV (b/l Cr 1.9-2.2), hypothyroidism who BIBEMS 2/2 worsening work of breathing presents in acute on chronic systolic CHF with NYHA IV functional status. Was notably on midodrine as outpatient since discharge from [facility] in 10/19. Has been having increased work of breathing at home with failure to thrive. Initially required Bipap and was given bumex with some improvement. Continues to be dyspneic. Etiology of her symptoms may be secondary to degree of mitral stenosis given HR in 80-90s with mean gradient of 8 on TTE. Also, per nursing staff some of her dyspnea may be anxiety related. She is s/p RHC 1/20 which showed mildly elevated filling pressures with preserved CO and was started on milrinone to assist with diuresis given her fluctuating renal function.     She currently appears close to euvolemic on exam although without symptomatic improvement. Her BUN and Cr are relatively stable although elevated above baseline. She is low normotensive tolerating low to moderate dose GDMT.    1/20/20 RHC: RA 15, RV 49/10, PA 50/21 32, PCWP 19, PA 55.4, Ao 98. [place] 3.5/2.5

## 2021-01-25 NOTE — PROGRESS NOTE ADULT - ASSESSMENT
74 yo F w/ PMH of CKD stage 4 (baseline Cr 1.9-2.2) HTN, T2DM, CAD s/p CABG X3 (2014 at [facility]), non-dilated ICM (EF 20-25%), severe mitral regurgitation s/p mitral clip (6/13/19), severe pulm HTN, hypothyroidism who presented for evaluation of SOB and was admitted for ADHF.    CKD stage 4  - baseline Cr 1.9-2.2; has had recurrent MERVIN's over the years  - CKD is likely 2/2 recurrent MERVIN's + underlying DM/HTN  - Scr stable today. BUN elevated due to diuretics. Pt on bumex drip Diuretics per heart failure team   - Avoid hypotension   - renal sonogram in the past with simple renal cyst  - d/w daughter and son-in-law on 01/23 about worsening renal function and if no improvement likely need for dialysis. They were also informed that she will be a poor candidate for HD in view of CHF. They understand but wants HD if needed. Consent for HD is in chart.  - She is not uremic, monitor at present  - Avoid nephrotoxins including NSAIDs, IV contrast (if possible), [employer] products  - monitor I/O's accurately    HTN  BP controlled  Low salt diet   MOnitor BP    Proteinuria/Hematuria  - likely from underlying DM  - has 1.8 grams proteinuria  - Hep B, Hep C, HIV RF, C3, C4, p-ANCA, c-ANCA, RPR neg  - weak gamma-migrating protein, weak IgG lambda protein band noted in the past. Pt to follow w/ heme   - DEAN 1:320 positive  - RPR neg, FTA +

## 2021-01-25 NOTE — PROGRESS NOTE ADULT - SUBJECTIVE AND OBJECTIVE BOX
Subjective:  - Continues to reports SOB without much change or improvement in symptoms    Medications:  ALPRAZolam 0.25 milliGRAM(s) Oral every 8 hours PRN  aspirin enteric coated 81 milliGRAM(s) Oral daily  atorvastatin 80 milliGRAM(s) Oral at bedtime  buMETAnide Infusion 0.5 mG/Hr IV Continuous <Continuous>  calamine/zinc oxide Lotion 1 Application(s) Topical three times a day  clobetasol 0.05% Cream 1 Application(s) Topical every 12 hours  clopidogrel Tablet 75 milliGRAM(s) Oral daily  dextrose 40% Gel 15 Gram(s) Oral once  dextrose 5%. 1000 milliLiter(s) IV Continuous <Continuous>  dextrose 5%. 1000 milliLiter(s) IV Continuous <Continuous>  dextrose 50% Injectable 25 Gram(s) IV Push once  dextrose 50% Injectable 12.5 Gram(s) IV Push once  dextrose 50% Injectable 25 Gram(s) IV Push once  glucagon  Injectable 1 milliGRAM(s) IntraMuscular once  heparin   Injectable 5000 Unit(s) SubCutaneous every 8 hours  hydrALAZINE 25 milliGRAM(s) Oral every 8 hours  insulin glargine Injectable (LANTUS) 56 Unit(s) SubCutaneous at bedtime  insulin glargine Injectable (LANTUS) 56 Unit(s) SubCutaneous every morning  insulin lispro (ADMELOG) corrective regimen sliding scale   SubCutaneous three times a day before meals  insulin lispro (ADMELOG) corrective regimen sliding scale   SubCutaneous at bedtime  insulin lispro Injectable (ADMELOG) 30 Unit(s) SubCutaneous three times a day before meals  levothyroxine Injectable 25 MICROGram(s) IV Push at bedtime  melatonin 3 milliGRAM(s) Oral at bedtime  metoprolol succinate  milliGRAM(s) Oral daily  milrinone Infusion 0.25 MICROgram(s)/kG/Min IV Continuous <Continuous>  polyethylene glycol 3350 17 Gram(s) Oral two times a day PRN  senna 2 Tablet(s) Oral at bedtime      Physical Exam:    Vitals:  Vital Signs Last 24 Hours  T(C): 36.3 (21 @ 11:12), Max: 36.6 (21 @ 20:11)  HR: 88 (21 @ 13:11) (80 - 88)  BP: 100/58 (21 @ 13:11) (95/56 - 111/59)  RR: 19 (21 @ 11:12) (18 - 20)  SpO2: 99% (21 @ 11:12) (97% - 100%)    Weight in k.3 ( @ 07:25)    I&O's Summary    2021 07:  -  2021 07:00  --------------------------------------------------------  IN: 1044.4 mL / OUT: 1440 mL / NET: -395.6 mL    2021 07:  -  2021 13:13  --------------------------------------------------------  IN: 100 mL / OUT: 0 mL / NET: 100 mL    Tele: SR 1st deg AVB HR 80-90    General: Frail.   HEENT: EOM intact.  Neck: Neck supple. JVP 6-8cm H2O. No masses  Chest: Crackles RLL, Diminished LLL, 4L NC  CV: Regular, Normal S1 and S2. II/VI SM. Radial pulses normal. No LE edema.  Abdomen: Soft, non-distended, non-tender  Skin: No rashes or skin breakdown. Warm peripherally  Neurology: Alert and oriented times three. Sensation intact  Psych: Depressed mood.    Labs:                        10.1   9.65  )-----------( 484      ( 2021 05:56 )             32.4         136  |  97  |  119<H>  ----------------------------<  114<H>  3.6   |  23  |  2.32<H>    Ca    9.5      2021 05:56  Phos  5.1       Mg     2.4

## 2021-01-25 NOTE — PROGRESS NOTE ADULT - SUBJECTIVE AND OBJECTIVE BOX
[facility] NEPHROLOGY PRACTICE   MD Dion Dasilva MD, D.O. Ruoru Wong, PA    From 7 AM - 5 PM:  OFFICE: [phone]  Dr. Muhammad cell: [phone]  Dr. Montero cell: [phone]  Dr. Soria cell: [phone]  RASHIDA Smith cell: [phone]    From 5 PM - 7 AM: Answering Service: [phone]  Date of service: 01-25-21 @ 10:56    RENAL FOLLOW UP NOTE  --------------------------------------------------------------------------------  HPI:  Pt seen and examined at bedside. Pt on bumex drip     PAST HISTORY  --------------------------------------------------------------------------------  No significant changes to PMH, PSH, FHx, SHx, unless otherwise noted    ALLERGIES & MEDICATIONS  --------------------------------------------------------------------------------  Allergies    azithromycin (Hives; Pruritus)    Intolerances      Standing Inpatient Medications  aspirin enteric coated 81 milliGRAM(s) Oral daily  atorvastatin 80 milliGRAM(s) Oral at bedtime  buMETAnide Infusion 0.5 mG/Hr IV Continuous <Continuous>  calamine/zinc oxide Lotion 1 Application(s) Topical three times a day  clobetasol 0.05% Cream 1 Application(s) Topical every 12 hours  clopidogrel Tablet 75 milliGRAM(s) Oral daily  dextrose 40% Gel 15 Gram(s) Oral once  dextrose 5%. 1000 milliLiter(s) IV Continuous <Continuous>  dextrose 5%. 1000 milliLiter(s) IV Continuous <Continuous>  dextrose 50% Injectable 25 Gram(s) IV Push once  dextrose 50% Injectable 12.5 Gram(s) IV Push once  dextrose 50% Injectable 25 Gram(s) IV Push once  glucagon  Injectable 1 milliGRAM(s) IntraMuscular once  heparin   Injectable 5000 Unit(s) SubCutaneous every 8 hours  hydrALAZINE 25 milliGRAM(s) Oral every 8 hours  insulin glargine Injectable (LANTUS) 56 Unit(s) SubCutaneous at bedtime  insulin glargine Injectable (LANTUS) 56 Unit(s) SubCutaneous every morning  insulin lispro (ADMELOG) corrective regimen sliding scale   SubCutaneous three times a day before meals  insulin lispro (ADMELOG) corrective regimen sliding scale   SubCutaneous at bedtime  insulin lispro Injectable (ADMELOG) 30 Unit(s) SubCutaneous three times a day before meals  levothyroxine Injectable 25 MICROGram(s) IV Push at bedtime  melatonin 3 milliGRAM(s) Oral at bedtime  metoprolol succinate  milliGRAM(s) Oral daily  milrinone Infusion 0.25 MICROgram(s)/kG/Min IV Continuous <Continuous>  senna 2 Tablet(s) Oral at bedtime    PRN Inpatient Medications  ALPRAZolam 0.25 milliGRAM(s) Oral every 8 hours PRN  polyethylene glycol 3350 17 Gram(s) Oral two times a day PRN      REVIEW OF SYSTEMS  --------------------------------------------------------------------------------  General: no fever  CVS: no chest pain  RESP: no sob, no cough  ABD: no abdominal pain  : no dysuria,  MSK: no edema     VITALS/PHYSICAL EXAM  --------------------------------------------------------------------------------  T(C): 36.3 (01-25-21 @ 04:20), Max: 36.6 (01-24-21 @ 20:11)  HR: 86 (01-25-21 @ 04:20) (85 - 86)  BP: 102/58 (01-25-21 @ 04:20) (102/58 - 111/59)  RR: 18 (01-25-21 @ 04:20) (18 - 20)  SpO2: 100% (01-25-21 @ 04:20) (97% - 100%)  Wt(kg): --        01-24-21 @ 07:01  -  01-25-21 @ 07:00  --------------------------------------------------------  IN: 1044.4 mL / OUT: 1440 mL / NET: -395.6 mL    01-25-21 @ 07:01  -  01-25-21 @ 10:56  --------------------------------------------------------  IN: 100 mL / OUT: 0 mL / NET: 100 mL      Physical Exam:  	Gen: NAD  	HEENT: MMM  	Pulm: CTA B/L  	CV: S1S2  	Abd: Soft, +BS  	Ext: No LE edema B/L                      Neuro: Awake   	Skin: Warm and Dry   	    LABS/STUDIES  --------------------------------------------------------------------------------              10.1   9.65  >-----------<  484      [01-25-21 @ 05:56]              32.4     136  |  97  |  119  ----------------------------<  114      [01-25-21 @ 05:56]  3.6   |  23  |  2.32        Ca     9.5     [01-25-21 @ 05:56]      Mg     2.4     [01-25-21 @ 05:56]      Phos  5.1     [01-25-21 @ 05:56]    Creatinine Trend:  SCr 2.32 [01-25 @ 05:56]  SCr 2.08 [01-24 @ 19:57]  SCr 2.18 [01-24 @ 05:58]  SCr 2.31 [01-23 @ 15:55]  SCr 2.40 [01-23 @ 04:07]    Urinalysis - [01-22-21 @ 22:32]      Color Light Yellow / Appearance Clear / SG 1.011 / pH 5.5      Gluc Trace / Ketone Negative  / Bili Negative / Urobili Negative       Blood Negative / Protein 30 mg/dL / Leuk Est Large / Nitrite Negative      RBC 2 / WBC 35 / Hyaline 1 / Gran  / Sq Epi  / Non Sq Epi 2 / Bacteria Negative      Ferritin 111      [01-13-21 @ 01:42]  HbA1c 7.5      [10-08-19 @ 14:30]  TSH 2.15      [01-13-21 @ 10:07]

## 2021-01-25 NOTE — PROGRESS NOTE ADULT - PROBLEM SELECTOR PLAN 1
- 2/2 CHF exacerbation.  EF 20% w/ mitral stenosis   - s/p TID 4mg Bumex; stopped 1/18 b/c of increased Cr and euvolemic status   - CXR on admission showed bilateral predominantly lower lobe hazy opacities which is likely 2/2 fluid.  Not presenting w/ clinical signs of PNA and CT chest showed no focal consolidations.  Monitoring of abx.  Blood cx also negative  - s/p Lopressor for MS; was titrating dose to achieve HR 60-70.  D/C'd 1/20 after RHC 1/20 showed elevated filling pressures.  Pt now on milrinone drip and Bumex restarted.  Bladder scan shows no evidence of retention (120cc)  - BPs remain soft  - per HF restarted bumex drip at 0.5mg/hr 1/24; c/w milrinone drip .- 2/2 CHF exacerbation.  EF 20% w/ mitral stenosis.  CXR on admission showed bilateral predominantly lower lobe hazy opacities which is likely 2/2 fluid.  Not presenting w/ clinical signs of PNA and CT chest showed no focal consolidations.  Monitored off abx.  Blood cx also negative  - will c/w Toprol  qd for MS hx; goal 60-70.  If HR still in the 80s on 1/26 will re-fractionate to tartrate and increased to 75 BID  - pt was being treated w/ IV diuresis which was stopped but restarted after RHC 1/20 showed elevated filling pressures. Milrinone drip and Bumex restarted (bladder scan showed evidence of retention (120cc) @ that time).    - Pt now s/p Bumex drip but is still on milrinone drip (dosage decreased 1/25)  - CT chest to assess pulmonary status ordered in the setting of persistent dyspnea  - started PRN xanax for possible anxiety component

## 2021-01-25 NOTE — PROGRESS NOTE ADULT - SUBJECTIVE AND OBJECTIVE BOX
Jacquie Amos MD  Internal Medicine, PGY-3  [phone]    Patient is a 75y old  Female who presents with a chief complaint of increased work of breathing (24 Jan 2021 20:25)      SUBJECTIVE / OVERNIGHT EVENTS:  no acute events overnight.      REVIEW OF SYSTEMS:    CONSTITUTIONAL: No weakness, fevers or chills  EYES: No visual changes; No blurry vision  ENT:  No pain or stiffness; No vertigo or throat pain  RESPIRATORY: No cough, wheezing, hemoptysis; No shortness of breath  CARDIOVASCULAR: No chest pain or palpitations  GASTROINTESTINAL: No abdominal or epigastric pain. No nausea, vomiting, or hematemesis; No diarrhea or constipation. No melena or hematochezia.  GENITOURINARY: No dysuria, frequency or hematuria  NEUROLOGICAL: No numbness, No HA  SKIN: No itching, rashes  MSK: no joint pain, no muscle pain    MEDICATIONS  (STANDING):  aspirin enteric coated 81 milliGRAM(s) Oral daily  atorvastatin 80 milliGRAM(s) Oral at bedtime  buMETAnide Infusion 0.5 mG/Hr (2.5 mL/Hr) IV Continuous <Continuous>  calamine/zinc oxide Lotion 1 Application(s) Topical three times a day  clobetasol 0.05% Cream 1 Application(s) Topical every 12 hours  clopidogrel Tablet 75 milliGRAM(s) Oral daily  dextrose 40% Gel 15 Gram(s) Oral once  dextrose 5%. 1000 milliLiter(s) (50 mL/Hr) IV Continuous <Continuous>  dextrose 5%. 1000 milliLiter(s) (100 mL/Hr) IV Continuous <Continuous>  dextrose 50% Injectable 25 Gram(s) IV Push once  dextrose 50% Injectable 12.5 Gram(s) IV Push once  dextrose 50% Injectable 25 Gram(s) IV Push once  glucagon  Injectable 1 milliGRAM(s) IntraMuscular once  heparin   Injectable 5000 Unit(s) SubCutaneous every 8 hours  hydrALAZINE 25 milliGRAM(s) Oral every 8 hours  insulin glargine Injectable (LANTUS) 56 Unit(s) SubCutaneous at bedtime  insulin glargine Injectable (LANTUS) 56 Unit(s) SubCutaneous every morning  insulin lispro (ADMELOG) corrective regimen sliding scale   SubCutaneous three times a day before meals  insulin lispro (ADMELOG) corrective regimen sliding scale   SubCutaneous at bedtime  insulin lispro Injectable (ADMELOG) 30 Unit(s) SubCutaneous three times a day before meals  levothyroxine Injectable 25 MICROGram(s) IV Push at bedtime  melatonin 3 milliGRAM(s) Oral at bedtime  metoprolol succinate  milliGRAM(s) Oral daily  milrinone Infusion 0.25 MICROgram(s)/kG/Min (3.74 mL/Hr) IV Continuous <Continuous>  senna 2 Tablet(s) Oral at bedtime    MEDICATIONS  (PRN):  ALPRAZolam 0.25 milliGRAM(s) Oral every 8 hours PRN anxiety  polyethylene glycol 3350 17 Gram(s) Oral two times a day PRN Constipation      CAPILLARY BLOOD GLUCOSE      POCT Blood Glucose.: 119 mg/dL (25 Jan 2021 07:21)  POCT Blood Glucose.: 260 mg/dL (24 Jan 2021 21:35)  POCT Blood Glucose.: 253 mg/dL (24 Jan 2021 16:15)  POCT Blood Glucose.: 318 mg/dL (24 Jan 2021 11:16)    I&O's Summary    24 Jan 2021 07:01  -  25 Jan 2021 07:00  --------------------------------------------------------  IN: 1044.4 mL / OUT: 1440 mL / NET: -395.6 mL        PHYSICAL EXAM:  Vital Signs Last 24 Hrs  T(C): 36.3 (25 Jan 2021 04:20), Max: 36.6 (24 Jan 2021 10:52)  T(F): 97.3 (25 Jan 2021 04:20), Max: 97.9 (24 Jan 2021 20:11)  HR: 86 (25 Jan 2021 04:20) (85 - 89)  BP: 102/58 (25 Jan 2021 04:20) (102/58 - 115/57)  BP(mean): --  RR: 18 (25 Jan 2021 04:20) (18 - 20)  SpO2: 100% (25 Jan 2021 04:20) (97% - 100%)    PHYSICAL EXAM:  GENERAL: NAD, well-groomed, well-developed  HEAD:  Atraumatic, Normocephalic  EYES: EOMI, PERRLA, conjunctiva and sclera clear  ENMT: No tonsillar erythema, exudates, or enlargement; Moist mucous membranes, Good dentition, No lesions  NECK: Supple, No JVD, Normal thyroid  CHEST/LUNG: Clear to ausculation bilaterally; No rales, rhonchi, wheezing, or rubs  HEART: Regular rate and rhythm; No murmurs, rubs, or gallops  ABDOMEN: Soft, Nontender, Nondistended; Bowel sounds present  EXTREMITIES:  2+ Peripheral Pulses, No clubbing, cyanosis, or edema  LYMPH: No lymphadenopathy noted  SKIN: No rashes or lesions  NERVOUS SYSTEM:  Alert & Oriented X3, Good concentration; Motor Strength 5/5 B/L upper and lower extremities; DTRs 2+ intact and symmetric      LABS:                        10.1   9.65  )-----------( 484      ( 25 Jan 2021 05:56 )             32.4     01-25    136  |  97  |  119<H>  ----------------------------<  114<H>  3.6   |  23  |  2.32<H>    Ca    9.5      25 Jan 2021 05:56  Phos  5.1     01-25  Mg     2.4     01-25                Procalcitonin, Serum: 0.14 (01-12)    C-Reactive Protein, Serum: 0.63 (01-12)    Ferritin, Serum: 111 (01-13)      D-Dimer Assay, Quantitative: 433 (01-12)         Jacquie Amos MD  Internal Medicine, PGY-3  [phone]    Patient is a 75y old  Female who presents with a chief complaint of increased work of breathing (24 Jan 2021 20:25)      SUBJECTIVE / OVERNIGHT EVENTS:  no acute events overnight.  Urine culture grew E. coli and U/A positive however pt is asymptomatic; confirmed w/ nurse.       REVIEW OF SYSTEMS:    CONSTITUTIONAL: +weakness  RESPIRATORY: + SOB  CARDIOVASCULAR: + chest pressure   GASTROINTESTINAL: No abdominal pain.  No constipation.  GENITOURINARY: No dysuria, frequency or hematuria      MEDICATIONS  (STANDING):  aspirin enteric coated 81 milliGRAM(s) Oral daily  atorvastatin 80 milliGRAM(s) Oral at bedtime  buMETAnide Infusion 0.5 mG/Hr (2.5 mL/Hr) IV Continuous <Continuous>  calamine/zinc oxide Lotion 1 Application(s) Topical three times a day  clobetasol 0.05% Cream 1 Application(s) Topical every 12 hours  clopidogrel Tablet 75 milliGRAM(s) Oral daily  dextrose 40% Gel 15 Gram(s) Oral once  dextrose 5%. 1000 milliLiter(s) (50 mL/Hr) IV Continuous <Continuous>  dextrose 5%. 1000 milliLiter(s) (100 mL/Hr) IV Continuous <Continuous>  dextrose 50% Injectable 25 Gram(s) IV Push once  dextrose 50% Injectable 12.5 Gram(s) IV Push once  dextrose 50% Injectable 25 Gram(s) IV Push once  glucagon  Injectable 1 milliGRAM(s) IntraMuscular once  heparin   Injectable 5000 Unit(s) SubCutaneous every 8 hours  hydrALAZINE 25 milliGRAM(s) Oral every 8 hours  insulin glargine Injectable (LANTUS) 56 Unit(s) SubCutaneous at bedtime  insulin glargine Injectable (LANTUS) 56 Unit(s) SubCutaneous every morning  insulin lispro (ADMELOG) corrective regimen sliding scale   SubCutaneous three times a day before meals  insulin lispro (ADMELOG) corrective regimen sliding scale   SubCutaneous at bedtime  insulin lispro Injectable (ADMELOG) 30 Unit(s) SubCutaneous three times a day before meals  levothyroxine Injectable 25 MICROGram(s) IV Push at bedtime  melatonin 3 milliGRAM(s) Oral at bedtime  metoprolol succinate  milliGRAM(s) Oral daily  milrinone Infusion 0.25 MICROgram(s)/kG/Min (3.74 mL/Hr) IV Continuous <Continuous>  senna 2 Tablet(s) Oral at bedtime    MEDICATIONS  (PRN):  ALPRAZolam 0.25 milliGRAM(s) Oral every 8 hours PRN anxiety  polyethylene glycol 3350 17 Gram(s) Oral two times a day PRN Constipation      CAPILLARY BLOOD GLUCOSE      POCT Blood Glucose.: 119 mg/dL (25 Jan 2021 07:21)  POCT Blood Glucose.: 260 mg/dL (24 Jan 2021 21:35)  POCT Blood Glucose.: 253 mg/dL (24 Jan 2021 16:15)  POCT Blood Glucose.: 318 mg/dL (24 Jan 2021 11:16)    I&O's Summary    24 Jan 2021 07:01  -  25 Jan 2021 07:00  --------------------------------------------------------  IN: 1044.4 mL / OUT: 1440 mL / NET: -395.6 mL        PHYSICAL EXAM:  Vital Signs Last 24 Hrs  T(C): 36.3 (25 Jan 2021 04:20), Max: 36.6 (24 Jan 2021 10:52)  T(F): 97.3 (25 Jan 2021 04:20), Max: 97.9 (24 Jan 2021 20:11)  HR: 86 (25 Jan 2021 04:20) (85 - 89)  BP: 102/58 (25 Jan 2021 04:20) (102/58 - 115/57)  BP(mean): --  RR: 18 (25 Jan 2021 04:20) (18 - 20)  SpO2: 100% (25 Jan 2021 04:20) (97% - 100%)    PHYSICAL EXAM:  GENERAL: NAD, cachectic   CHEST/LUNG: no wheeze, trace bibasilar crackles   HEART: Regular rate and rhythm; No murmurs, rubs, or gallops  ABDOMEN: Soft, Nontender, Nondistended; Bowel sounds present  EXTREMITIES:  2+ Peripheral Pulses, No clubbing, cyanosis, or edema  NERVOUS SYSTEM:  Alert & Oriented X3, no focal deficits       LABS:                        10.1   9.65  )-----------( 484      ( 25 Jan 2021 05:56 )             32.4     01-25    136  |  97  |  119<H>  ----------------------------<  114<H>  3.6   |  23  |  2.32<H>    Ca    9.5      25 Jan 2021 05:56  Phos  5.1     01-25  Mg     2.4     01-25                Procalcitonin, Serum: 0.14 (01-12)    C-Reactive Protein, Serum: 0.63 (01-12)    Ferritin, Serum: 111 (01-13)      D-Dimer Assay, Quantitative: 433 (01-12)

## 2021-01-25 NOTE — PROGRESS NOTE ADULT - PROBLEM SELECTOR PLAN 2
-TTE 1/13 showed EF of 20% with global LV and RV dysfunction w/ MS  - Heart failure and cardiology following  - stopped bumex 1/18 for euvolemic status and increasing Cr   - s/p Lopressor for MS; was titrating dose to achieve HR 60-70.  Lopressor D/C'd 1/20 after RHC 1/20 showed elevated filling pressures.  Pt now on milrinone drip and Bumex restarted  - per HF not dobutamine indicated at this time  - repeat TTE showed severely decreased LV fx and grossly decreased RV fx w/ EF 25-30% -TTE 1/13 showed EF of 20% with global LV and RV dysfunction w/ MS.   Repeat TTE 1/19 showed severely decreased LV fx and grossly decreased RV fx w/ EF 25-30%  - Heart failure and cardiology following  - see above for history.    - c/w decreased dosage of milrinone  - monitor off diuretics (s/p bumex drip)  - c/w Toprol XL 100qd

## 2021-01-25 NOTE — PROGRESS NOTE ADULT - ASSESSMENT
Assessment  DMT2: 75y Female with DM T2 with hyperglycemia, A1C 7.1%, was on insulin at home, now on basal bolus insulin, BID basal dose adjusted by primary team, blood sugars fluctuating though overall improving, no hypoglycemic episodes, eating meals, appears comfortable.  HF: on medications, stable, monitored, FU Cardiology.  Hypothyroidism: On Synthroid 50 mcg po daily, compliant with Synthroid intake, asymptomatic, euthyroid.  CKD: Monitor labs/BMP      Jillian Banks MD  Cell: [phone]  Office: [phone]       Assessment  DMT2: 75y Female with DM T2 with hyperglycemia, A1C 7.1%, was on insulin at home, now on basal bolus insulin,  BID basal dose adjusted by primary team, blood sugars fluctuating though overall improving, no hypoglycemic episodes, eating meals, appears comfortable.  HF: on medications, stable, monitored, FU Cardiology.  Hypothyroidism: On Synthroid 50 mcg po daily, compliant with Synthroid intake, asymptomatic, euthyroid.  CKD: Monitor labs/BMP      Jillian Banks MD  Cell: [phone]  Office: [phone]

## 2021-01-26 LAB
-  AMIKACIN: SIGNIFICANT CHANGE UP
-  AMOXICILLIN/CLAVULANIC ACID: SIGNIFICANT CHANGE UP
-  AMPICILLIN/SULBACTAM: SIGNIFICANT CHANGE UP
-  AMPICILLIN: SIGNIFICANT CHANGE UP
-  AZTREONAM: SIGNIFICANT CHANGE UP
-  CEFAZOLIN: SIGNIFICANT CHANGE UP
-  CEFEPIME: SIGNIFICANT CHANGE UP
-  CEFOXITIN: SIGNIFICANT CHANGE UP
-  CEFTRIAXONE: SIGNIFICANT CHANGE UP
-  CIPROFLOXACIN: SIGNIFICANT CHANGE UP
-  ERTAPENEM: SIGNIFICANT CHANGE UP
-  GENTAMICIN: SIGNIFICANT CHANGE UP
-  IMIPENEM: SIGNIFICANT CHANGE UP
-  LEVOFLOXACIN: SIGNIFICANT CHANGE UP
-  MEROPENEM: SIGNIFICANT CHANGE UP
-  NITROFURANTOIN: SIGNIFICANT CHANGE UP
-  PIPERACILLIN/TAZOBACTAM: SIGNIFICANT CHANGE UP
-  TIGECYCLINE: SIGNIFICANT CHANGE UP
-  TOBRAMYCIN: SIGNIFICANT CHANGE UP
-  TRIMETHOPRIM/SULFAMETHOXAZOLE: SIGNIFICANT CHANGE UP
ANION GAP SERPL CALC-SCNC: 16 MMOL/L — SIGNIFICANT CHANGE UP (ref 5–17)
BUN SERPL-MCNC: 102 MG/DL — HIGH (ref 7–23)
CALCIUM SERPL-MCNC: 9.5 MG/DL — SIGNIFICANT CHANGE UP (ref 8.4–10.5)
CHLORIDE SERPL-SCNC: 100 MMOL/L — SIGNIFICANT CHANGE UP (ref 96–108)
CO2 SERPL-SCNC: 20 MMOL/L — LOW (ref 22–31)
CREAT SERPL-MCNC: 2.19 MG/DL — HIGH (ref 0.5–1.3)
CULTURE RESULTS: SIGNIFICANT CHANGE UP
GLUCOSE BLDC GLUCOMTR-MCNC: 124 MG/DL — HIGH (ref 70–99)
GLUCOSE BLDC GLUCOMTR-MCNC: 157 MG/DL — HIGH (ref 70–99)
GLUCOSE BLDC GLUCOMTR-MCNC: 181 MG/DL — HIGH (ref 70–99)
GLUCOSE BLDC GLUCOMTR-MCNC: 231 MG/DL — HIGH (ref 70–99)
GLUCOSE BLDC GLUCOMTR-MCNC: 249 MG/DL — HIGH (ref 70–99)
GLUCOSE SERPL-MCNC: 134 MG/DL — HIGH (ref 70–99)
METHOD TYPE: SIGNIFICANT CHANGE UP
ORGANISM # SPEC MICROSCOPIC CNT: SIGNIFICANT CHANGE UP
ORGANISM # SPEC MICROSCOPIC CNT: SIGNIFICANT CHANGE UP
POTASSIUM SERPL-MCNC: 4.2 MMOL/L — SIGNIFICANT CHANGE UP (ref 3.5–5.3)
POTASSIUM SERPL-SCNC: 4.2 MMOL/L — SIGNIFICANT CHANGE UP (ref 3.5–5.3)
SODIUM SERPL-SCNC: 136 MMOL/L — SIGNIFICANT CHANGE UP (ref 135–145)
SPECIMEN SOURCE: SIGNIFICANT CHANGE UP

## 2021-01-26 PROCEDURE — 99233 SBSQ HOSP IP/OBS HIGH 50: CPT

## 2021-01-26 PROCEDURE — 99232 SBSQ HOSP IP/OBS MODERATE 35: CPT | Mod: GC

## 2021-01-26 RX ORDER — LEVOTHYROXINE SODIUM 125 MCG
50 TABLET ORAL DAILY
Refills: 0 | Status: DISCONTINUED | OUTPATIENT
Start: 2021-01-27 | End: 2021-02-02

## 2021-01-26 RX ORDER — LEVOTHYROXINE SODIUM 125 MCG
25 TABLET ORAL ONCE
Refills: 0 | Status: COMPLETED | OUTPATIENT
Start: 2021-01-26 | End: 2021-01-26

## 2021-01-26 RX ORDER — BACLOFEN 100 %
2.5 POWDER (GRAM) MISCELLANEOUS ONCE
Refills: 0 | Status: COMPLETED | OUTPATIENT
Start: 2021-01-26 | End: 2021-01-29

## 2021-01-26 RX ORDER — BACLOFEN 100 %
2.5 POWDER (GRAM) MISCELLANEOUS ONCE
Refills: 0 | Status: COMPLETED | OUTPATIENT
Start: 2021-01-26 | End: 2021-01-26

## 2021-01-26 RX ORDER — BUMETANIDE 0.25 MG/ML
2 INJECTION INTRAMUSCULAR; INTRAVENOUS DAILY
Refills: 0 | Status: DISCONTINUED | OUTPATIENT
Start: 2021-01-27 | End: 2021-01-29

## 2021-01-26 RX ORDER — BUMETANIDE 0.25 MG/ML
2 INJECTION INTRAMUSCULAR; INTRAVENOUS EVERY 12 HOURS
Refills: 0 | Status: DISCONTINUED | OUTPATIENT
Start: 2021-01-26 | End: 2021-01-26

## 2021-01-26 RX ADMIN — INSULIN GLARGINE 56 UNIT(S): 100 INJECTION, SOLUTION SUBCUTANEOUS at 09:15

## 2021-01-26 RX ADMIN — Medication 1 APPLICATION(S): at 04:42

## 2021-01-26 RX ADMIN — SENNA PLUS 2 TABLET(S): 8.6 TABLET ORAL at 22:24

## 2021-01-26 RX ADMIN — Medication 2: at 08:14

## 2021-01-26 RX ADMIN — ATORVASTATIN CALCIUM 80 MILLIGRAM(S): 80 TABLET, FILM COATED ORAL at 22:24

## 2021-01-26 RX ADMIN — CLOPIDOGREL BISULFATE 75 MILLIGRAM(S): 75 TABLET, FILM COATED ORAL at 11:51

## 2021-01-26 RX ADMIN — Medication 25 MILLIGRAM(S): at 04:42

## 2021-01-26 RX ADMIN — Medication 0.25 MILLIGRAM(S): at 00:44

## 2021-01-26 RX ADMIN — Medication 1 APPLICATION(S): at 17:01

## 2021-01-26 RX ADMIN — Medication 3 MILLIGRAM(S): at 22:24

## 2021-01-26 RX ADMIN — CALAMINE AND ZINC OXIDE AND PHENOL 1 APPLICATION(S): 160; 10 LOTION TOPICAL at 04:42

## 2021-01-26 RX ADMIN — Medication 25 MILLIGRAM(S): at 13:42

## 2021-01-26 RX ADMIN — Medication 25 MICROGRAM(S): at 22:24

## 2021-01-26 RX ADMIN — Medication 81 MILLIGRAM(S): at 11:51

## 2021-01-26 RX ADMIN — Medication 0.25 MILLIGRAM(S): at 22:36

## 2021-01-26 RX ADMIN — HEPARIN SODIUM 5000 UNIT(S): 5000 INJECTION INTRAVENOUS; SUBCUTANEOUS at 13:42

## 2021-01-26 RX ADMIN — HEPARIN SODIUM 5000 UNIT(S): 5000 INJECTION INTRAVENOUS; SUBCUTANEOUS at 04:42

## 2021-01-26 RX ADMIN — Medication 2.5 MILLIGRAM(S): at 12:33

## 2021-01-26 RX ADMIN — CALAMINE AND ZINC OXIDE AND PHENOL 1 APPLICATION(S): 160; 10 LOTION TOPICAL at 13:42

## 2021-01-26 RX ADMIN — BUMETANIDE 2 MILLIGRAM(S): 0.25 INJECTION INTRAMUSCULAR; INTRAVENOUS at 12:32

## 2021-01-26 RX ADMIN — Medication 25 MILLIGRAM(S): at 22:24

## 2021-01-26 RX ADMIN — Medication 2: at 17:00

## 2021-01-26 RX ADMIN — INSULIN GLARGINE 56 UNIT(S): 100 INJECTION, SOLUTION SUBCUTANEOUS at 22:23

## 2021-01-26 RX ADMIN — Medication 100 MILLIGRAM(S): at 04:42

## 2021-01-26 RX ADMIN — Medication 20 MILLIEQUIVALENT(S): at 00:03

## 2021-01-26 RX ADMIN — Medication 30 UNIT(S): at 17:00

## 2021-01-26 RX ADMIN — HEPARIN SODIUM 5000 UNIT(S): 5000 INJECTION INTRAVENOUS; SUBCUTANEOUS at 22:24

## 2021-01-26 RX ADMIN — CALAMINE AND ZINC OXIDE AND PHENOL 1 APPLICATION(S): 160; 10 LOTION TOPICAL at 22:24

## 2021-01-26 RX ADMIN — MILRINONE LACTATE 1.99 MICROGRAM(S)/KG/MIN: 1 INJECTION, SOLUTION INTRAVENOUS at 04:43

## 2021-01-26 RX ADMIN — Medication 20 MILLIEQUIVALENT(S): at 02:04

## 2021-01-26 RX ADMIN — Medication 30 UNIT(S): at 08:15

## 2021-01-26 RX ADMIN — Medication 0.25 MILLIGRAM(S): at 09:19

## 2021-01-26 NOTE — PROGRESS NOTE ADULT - ASSESSMENT
75y.o F Maori speaking h/o HTN, HLD, DM, CAD s/p CABG 2014 and prior PCI, severe mitral regurgitation (s/p mitral clip 2019), pulmonary HTN (TTE 2019), HF (EF 21 % June 2019), CKD IV not on dialysis (b/l SCr 2-2.2), hypothyroidism admitted for hypoxic respiratory failure 2/2 acute on chronic HFrEF exacerbation in the setting of mitral stenosis c/b MERVIN on CKD.  Pt initially treated w/ IV diuresis but was started on milrinone drip after RHC showed persistent elevated filled pressures. Now s/p  IV diuresis, milrinone dosage decreased but is w/ continued dyspnea despite clinical euvolemic status.  PRN xanax started for possible anxiety component.  Labs w/ intermittent elevations in WBC in the setting of asymptomatic bacteruria - continuing to monitor off abx.

## 2021-01-26 NOTE — PROGRESS NOTE ADULT - SUBJECTIVE AND OBJECTIVE BOX
Subjective:  - Reports no change in symptoms today, not feeling well. Endorses headache and neck pain.  - Refused [place]    - Denied SOB    Medications:  ALPRAZolam 0.25 milliGRAM(s) Oral every 8 hours PRN  aspirin enteric coated 81 milliGRAM(s) Oral daily  atorvastatin 80 milliGRAM(s) Oral at bedtime  baclofen 2.5 milliGRAM(s) Oral once  buMETAnide 2 milliGRAM(s) Oral every 12 hours  calamine/zinc oxide Lotion 1 Application(s) Topical three times a day  clobetasol 0.05% Cream 1 Application(s) Topical every 12 hours  clopidogrel Tablet 75 milliGRAM(s) Oral daily  dextrose 40% Gel 15 Gram(s) Oral once  dextrose 5%. 1000 milliLiter(s) IV Continuous <Continuous>  dextrose 5%. 1000 milliLiter(s) IV Continuous <Continuous>  dextrose 50% Injectable 25 Gram(s) IV Push once  dextrose 50% Injectable 12.5 Gram(s) IV Push once  dextrose 50% Injectable 25 Gram(s) IV Push once  glucagon  Injectable 1 milliGRAM(s) IntraMuscular once  heparin   Injectable 5000 Unit(s) SubCutaneous every 8 hours  hydrALAZINE 25 milliGRAM(s) Oral every 8 hours  insulin glargine Injectable (LANTUS) 56 Unit(s) SubCutaneous at bedtime  insulin glargine Injectable (LANTUS) 56 Unit(s) SubCutaneous every morning  insulin lispro (ADMELOG) corrective regimen sliding scale   SubCutaneous three times a day before meals  insulin lispro (ADMELOG) corrective regimen sliding scale   SubCutaneous at bedtime  insulin lispro Injectable (ADMELOG) 30 Unit(s) SubCutaneous three times a day before meals  levothyroxine Injectable 25 MICROGram(s) IV Push at bedtime  melatonin 3 milliGRAM(s) Oral at bedtime  metoprolol succinate  milliGRAM(s) Oral daily  milrinone Infusion 0.125 MICROgram(s)/kG/Min IV Continuous <Continuous>  polyethylene glycol 3350 17 Gram(s) Oral two times a day PRN  senna 2 Tablet(s) Oral at bedtime      Physical Exam:    Vitals:  Vital Signs Last 24 Hours  T(C): 36.7 (21 @ 11:36), Max: 36.7 (21 @ 11:36)  HR: 85 (21 @ 11:36) (80 - 89)  BP: 100/58 (21 @ 11:36) (100/58 - 116/68)  RR: 18 (21 @ 11:36) (18 - 19)  SpO2: 96% (21 @ 11:36) (94% - 99%)    Weight in k ( @ 07:01)    I&O's Summary    2021 07:  -  2021 07:00  --------------------------------------------------------  IN: 657.9 mL / OUT: 740 mL / NET: -82.1 mL    Tele: SR 1st deg AVB HR 80-90s    General: Frail. Uncomfortable  HEENT: EOM intact.  Neck: Neck supple. JVP 8-10cm H2O. No masses  Chest: Crackles RLL, Diminished LLL  CV: Regular, Normal S1 and S2. II/VI SM. Radial pulses normal. No LE edema.  Abdomen: Soft, non-distended, non-tender  Skin: No rashes or skin breakdown. Warm peripherally  Neurology: Alert and oriented times three. Sensation intact  Psych: Depressed mood.    Labs:                        10.1   9.65  )-----------( 484      ( 2021 05:56 )             32.4         136  |  100  |  102<H>  ----------------------------<  134<H>  4.2   |  20<L>  |  2.19<H>    Ca    9.5      2021 07:14  Phos  5.1       Mg     2.4

## 2021-01-26 NOTE — PROGRESS NOTE ADULT - SUBJECTIVE AND OBJECTIVE BOX
S: Resting comfortably, laying relatively flat on room air. Complaining of neck pain. Denies chest pain or SOB. Review of systems otherwise (-)    Review of Systems:   Constitutional: [ ] fevers, [ ] chills.   Skin: [ ] dry skin. [ ] rashes.  Psychiatric: [ ] depression, [ ] anxiety.   Gastrointestinal: [ ] BRBPR, [ ] melena.   Neurological: [ ] confusion. [ ] seizures. [ ] shuffling gait.   Ears,Nose,Mouth and Throat: [ ] ear pain [ ] sore throat.   Eyes: [ ] diplopia.   Respiratory: [ ] hemoptysis. [ ] shortness of breath  Cardiovascular: See HPI above  Hematologic/Lymphatic: [ ] anemia. [ ] painful nodes. [ ] prolonged bleeding.   Genitourinary: [ ] hematuria. [ ] flank pain.   Endocrine: [ ] significant change in weight. [ ] intolerance to heat and cold.     Review of systems [ x] otherwise negative, [ ] otherwise unable to obtain    FH: no family history of sudden cardiac death in first degree relatives    SH: [ ] tobacco, [ ] alcohol, [ ] drugs       MEDICATIONS  (STANDING):  aspirin enteric coated 81 milliGRAM(s) Oral daily  atorvastatin 80 milliGRAM(s) Oral at bedtime  calamine/zinc oxide Lotion 1 Application(s) Topical three times a day  clobetasol 0.05% Cream 1 Application(s) Topical every 12 hours  clopidogrel Tablet 75 milliGRAM(s) Oral daily  dextrose 40% Gel 15 Gram(s) Oral once  dextrose 5%. 1000 milliLiter(s) (50 mL/Hr) IV Continuous <Continuous>  dextrose 5%. 1000 milliLiter(s) (100 mL/Hr) IV Continuous <Continuous>  dextrose 50% Injectable 25 Gram(s) IV Push once  dextrose 50% Injectable 12.5 Gram(s) IV Push once  dextrose 50% Injectable 25 Gram(s) IV Push once  glucagon  Injectable 1 milliGRAM(s) IntraMuscular once  heparin   Injectable 5000 Unit(s) SubCutaneous every 8 hours  hydrALAZINE 25 milliGRAM(s) Oral every 8 hours  insulin glargine Injectable (LANTUS) 56 Unit(s) SubCutaneous at bedtime  insulin glargine Injectable (LANTUS) 56 Unit(s) SubCutaneous every morning  insulin lispro (ADMELOG) corrective regimen sliding scale   SubCutaneous three times a day before meals  insulin lispro (ADMELOG) corrective regimen sliding scale   SubCutaneous at bedtime  insulin lispro Injectable (ADMELOG) 30 Unit(s) SubCutaneous three times a day before meals  levothyroxine Injectable 25 MICROGram(s) IV Push at bedtime  melatonin 3 milliGRAM(s) Oral at bedtime  metoprolol succinate  milliGRAM(s) Oral daily  senna 2 Tablet(s) Oral at bedtime    MEDICATIONS  (PRN):  ALPRAZolam 0.25 milliGRAM(s) Oral every 8 hours PRN anxiety  polyethylene glycol 3350 17 Gram(s) Oral two times a day PRN Constipation      LABS:                          10.1   9.65  )-----------( 484      ( 25 Jan 2021 05:56 )             32.4     Hemoglobin: 10.1 g/dL (01-25 @ 05:56)  Hemoglobin: 10.4 g/dL (01-24 @ 05:58)  Hemoglobin: 9.8 g/dL (01-23 @ 04:07)  Hemoglobin: 10.3 g/dL (01-22 @ 05:35)    01-26    136  |  100  |  102<H>  ----------------------------<  134<H>  4.2   |  20<L>  |  2.19<H>    Ca    9.5      26 Jan 2021 07:14  Phos  5.1     01-25  Mg     2.4     01-25      Creatinine Trend: 2.19<--, 2.41<--, 2.32<--, 2.08<--, 2.18<--, 2.31<--             01-25-21 @ 07:01 - 01-26-21 @ 07:00  --------------------------------------------------------  IN: 657.9 mL / OUT: 740 mL / NET: -82.1 mL    01-26-21 @ 07:01 - 01-26-21 @ 13:08  --------------------------------------------------------  IN: 480 mL / OUT: 0 mL / NET: 480 mL        PHYSICAL EXAM  Vital Signs Last 24 Hrs  T(C): 36.7 (26 Jan 2021 11:36), Max: 36.7 (26 Jan 2021 11:36)  T(F): 98.1 (26 Jan 2021 11:36), Max: 98.1 (26 Jan 2021 11:36)  HR: 85 (26 Jan 2021 11:36) (80 - 89)  BP: 100/58 (26 Jan 2021 11:36) (100/58 - 116/68)  BP(mean): --  RR: 18 (26 Jan 2021 11:36) (18 - 19)  SpO2: 96% (26 Jan 2021 11:36) (94% - 99%)      General: Well nourished in no acute distress. Alert and Oriented * 3.   Head: Normocephalic and atraumatic.   Neck: No JVD. No bruits. Supple. Does not appear to be enlarged.   Cardiovascular: + S1,S2 ; RRR Soft systolic murmur at the left lower sternal border. No rubs noted.    Lungs: CTA b/l. No rhonchi, rales or wheezes.   Abdomen: + BS, soft. Non tender. Non distended. No rebound. No guarding.   Extremities: No clubbing/cyanosis/edema.   Neurologic: Moves all four extremities. Full range of motion.   Skin: Warm and moist. The patient's skin has normal elasticity and good skin turgor.   Psychiatric: Appropriate mood and affect.  Musculoskeletal: Normal range of motion, normal strength    TELEMETRY: SR 80-90s    < from: Cardiac Cath Lab - Adult (01.20.21 @ 10:22) >  PROCEDURE:  --  Right heart catheterization.  --  Sonosite - Diagnostic.  COMPLICATIONS: There were no complications.  DIAGNOSTIC RECOMMENDATIONS: The patient should continuewith the present  medications.  Prepared and signed by  Cesar Jackson M.D.       A/P) 76 y/o female PMH HTN, HLD, DM, CAD s/p CABG 2014 and prior PCI, severe mitral regurgitation (s/p mitral clip 2019), pulmonary HTN, HF (EF 21 % June 2019), CKD IV not on dialysis (b/l SCr 2-2.2), hypothyroidism a/w acute on chronic systolic CHF with NYHA IV functional status.    -repeat echo noted but didn't reassess degree of mitral stenosis  -Continue dapt/statin   -RHC noted above - Filling pressures are appropriate considering LV function and the mitral valve gradient  -Diuretics on hold  -Continue Toprol XL  -Agree with d/c milrinone as it may increase HR and subsequently increase MV gradient  -trend creatinine - improved  -very poor candidate for a primary prevention ICD  -f/u with Dr. Sotelo on Friday 1/29 at 12 PM     Amauri Hill PA-C  Pager: [phone]

## 2021-01-26 NOTE — PROGRESS NOTE ADULT - PROBLEM SELECTOR PLAN 2
-TTE 1/13 showed EF of 20% with global LV and RV dysfunction w/ MS.   Repeat TTE 1/19 showed severely decreased LV fx and grossly decreased RV fx w/ EF 25-30%  - Heart failure and cardiology following  - see above for history.    - c/w decreased dosage of milrinone  - monitor off diuretics (s/p bumex drip)  - c/w Toprol XL 100qd -TTE 1/13 showed EF of 20% with global LV and RV dysfunction w/ MS.   Repeat TTE 1/19 showed severely decreased LV fx and grossly decreased RV fx w/ EF 25-30%  - Heart failure and cardiology following  - see above for history.    - c/w decreased dosage of milrinone  - s/p bumex drip.  Started back on home regimen on 1/26  - c/w Toprol XL 100qd

## 2021-01-26 NOTE — PROGRESS NOTE ADULT - ASSESSMENT
Assessment  DMT2: 75y Female with DM T2 with hyperglycemia, A1C 7.1%, was on insulin at home, now on basal bolus insulin, dose was adjusted, standing-dose Humalog held this afternoon 2/2 patient refusing lunch, blood sugars fluctuating within overall acceptable range with intermittent elevations, no hypoglycemic episodes, eating meals inconsistently.  HF: on medications, stable, monitored, FU Cardiology.  Hypothyroidism: On Synthroid 50 mcg po daily, compliant with Synthroid intake, asymptomatic, euthyroid.  CKD: Monitor labs/BMP      Jillian Banks MD  Cell: [phone]  Office: [phone]       Assessment  DMT2: 75y Female with DM T2 with hyperglycemia, A1C 7.1%, was on insulin at home, now on basal bolus insulin,  dose was adjusted, standing-dose Humalog held this afternoon 2/2 patient refusing lunch, blood sugars fluctuating within overall acceptable range with intermittent elevations, no hypoglycemic episodes, eating meals inconsistently.  HF: on medications, stable, monitored, FU Cardiology.  Hypothyroidism: On Synthroid 50 mcg po daily, compliant with Synthroid intake, asymptomatic, euthyroid.  CKD: Monitor labs/BMP      Jillina Banks MD  Cell: [phone]  Office: [phone]

## 2021-01-26 NOTE — PROGRESS NOTE ADULT - PROBLEM SELECTOR PLAN 3
- BUN remains elevated possibly in the setting of aggressive diuresis. Cr improved today, baseline Cr ~1.9-2.2  - continue to monitor off diuretics

## 2021-01-26 NOTE — PROGRESS NOTE ADULT - SUBJECTIVE AND OBJECTIVE BOX
Jacquie Amos MD  Internal Medicine, PGY-3  [phone]    Patient is a 75y old  Female who presents with a chief complaint of increased work of breathing (25 Jan 2021 15:38)      SUBJECTIVE / OVERNIGHT EVENTS:  No acute events overnight.  CT yesterday showed clear lungs.     ADDITIONAL ROS:     MEDICATIONS  (STANDING):  aspirin enteric coated 81 milliGRAM(s) Oral daily  atorvastatin 80 milliGRAM(s) Oral at bedtime  calamine/zinc oxide Lotion 1 Application(s) Topical three times a day  clobetasol 0.05% Cream 1 Application(s) Topical every 12 hours  clopidogrel Tablet 75 milliGRAM(s) Oral daily  dextrose 40% Gel 15 Gram(s) Oral once  dextrose 5%. 1000 milliLiter(s) (50 mL/Hr) IV Continuous <Continuous>  dextrose 5%. 1000 milliLiter(s) (100 mL/Hr) IV Continuous <Continuous>  dextrose 50% Injectable 25 Gram(s) IV Push once  dextrose 50% Injectable 12.5 Gram(s) IV Push once  dextrose 50% Injectable 25 Gram(s) IV Push once  glucagon  Injectable 1 milliGRAM(s) IntraMuscular once  heparin   Injectable 5000 Unit(s) SubCutaneous every 8 hours  hydrALAZINE 25 milliGRAM(s) Oral every 8 hours  insulin glargine Injectable (LANTUS) 56 Unit(s) SubCutaneous at bedtime  insulin glargine Injectable (LANTUS) 56 Unit(s) SubCutaneous every morning  insulin lispro (ADMELOG) corrective regimen sliding scale   SubCutaneous three times a day before meals  insulin lispro (ADMELOG) corrective regimen sliding scale   SubCutaneous at bedtime  insulin lispro Injectable (ADMELOG) 30 Unit(s) SubCutaneous three times a day before meals  levothyroxine Injectable 25 MICROGram(s) IV Push at bedtime  melatonin 3 milliGRAM(s) Oral at bedtime  metoprolol succinate  milliGRAM(s) Oral daily  milrinone Infusion 0.125 MICROgram(s)/kG/Min (1.99 mL/Hr) IV Continuous <Continuous>  senna 2 Tablet(s) Oral at bedtime    MEDICATIONS  (PRN):  ALPRAZolam 0.25 milliGRAM(s) Oral every 8 hours PRN anxiety  polyethylene glycol 3350 17 Gram(s) Oral two times a day PRN Constipation      CAPILLARY BLOOD GLUCOSE      POCT Blood Glucose.: 157 mg/dL (26 Jan 2021 07:21)  POCT Blood Glucose.: 229 mg/dL (25 Jan 2021 21:20)  POCT Blood Glucose.: 147 mg/dL (25 Jan 2021 16:40)  POCT Blood Glucose.: 133 mg/dL (25 Jan 2021 11:30)    I&O's Summary    25 Jan 2021 07:01  -  26 Jan 2021 07:00  --------------------------------------------------------  IN: 657.9 mL / OUT: 740 mL / NET: -82.1 mL        PHYSICAL EXAM:  Vital Signs Last 24 Hrs  T(C): 36.6 (26 Jan 2021 08:00), Max: 36.6 (25 Jan 2021 19:45)  T(F): 97.9 (26 Jan 2021 08:00), Max: 97.9 (25 Jan 2021 19:45)  HR: 86 (26 Jan 2021 08:00) (80 - 88)  BP: 116/68 (26 Jan 2021 08:00) (95/56 - 116/68)  BP(mean): --  RR: 18 (26 Jan 2021 08:00) (18 - 19)  SpO2: 98% (26 Jan 2021 08:00) (94% - 99%)    PHYSICAL EXAM:        LABS:                        10.1   9.65  )-----------( 484      ( 25 Jan 2021 05:56 )             32.4     01-26    136  |  100  |  102<H>  ----------------------------<  134<H>  4.2   |  20<L>  |  2.19<H>    Ca    9.5      26 Jan 2021 07:14  Phos  5.1     01-25  Mg     2.4     01-25                Culture - Urine (collected 24 Jan 2021 16:55)  Source: .Urine Clean Catch (Midstream)  Preliminary Report (25 Jan 2021 13:36):    10,000 - 49,000 CFU/mL Escherichia coli      Procalcitonin, Serum: 0.14 (01-12)    C-Reactive Protein, Serum: 0.63 (01-12)    Ferritin, Serum: 111 (01-13)      D-Dimer Assay, Quantitative: 433 (01-12)      < from: CT Chest No Cont (01.25.21 @ 16:02) >  INTERPRETATION:  Clinical information: Evaluate for pleural effusions. Exam is compared to previous study of 1/13/2021.    CT scan of the chest was obtained without administration of intravenous contrast.    No hilar and/or mediastinal adenopathy is noted.    Heart is enlarged in size. Patient is status post CABG and Jennifer clip placement. No pericardial effusion is noted.    No endobronchial lesions are noted. Evaluation of the lungs is limited due to breathing motion artifact. Minimal atelectasis is noted in the posterior dependent aspects of both lower lobes. No pleural effusions are noted.    Below the diaphragm, visualized portions of the abdomen demonstrate thickening of both adrenal glands. Low-attenuation lesion in the left kidney is indeterminate based on this exam.    Degenerative changes of the spine are noted.    Impression: No pleural effusions are noted.    < end of copied text >       Jacquie Amos MD  Internal Medicine, PGY-3  [phone]    Patient is a 75y old  Female who presents with a chief complaint of increased work of breathing (25 Jan 2021 15:38)      SUBJECTIVE / OVERNIGHT EVENTS:  No acute events overnight.  CT yesterday showed clear lungs. Pt complaining of neck pain today that is making it hard for her to sleep.     ADDITIONAL ROS: no fevers, no chills, + SOB, no CP. no dysuria    MEDICATIONS  (STANDING):  aspirin enteric coated 81 milliGRAM(s) Oral daily  atorvastatin 80 milliGRAM(s) Oral at bedtime  calamine/zinc oxide Lotion 1 Application(s) Topical three times a day  clobetasol 0.05% Cream 1 Application(s) Topical every 12 hours  clopidogrel Tablet 75 milliGRAM(s) Oral daily  dextrose 40% Gel 15 Gram(s) Oral once  dextrose 5%. 1000 milliLiter(s) (50 mL/Hr) IV Continuous <Continuous>  dextrose 5%. 1000 milliLiter(s) (100 mL/Hr) IV Continuous <Continuous>  dextrose 50% Injectable 25 Gram(s) IV Push once  dextrose 50% Injectable 12.5 Gram(s) IV Push once  dextrose 50% Injectable 25 Gram(s) IV Push once  glucagon  Injectable 1 milliGRAM(s) IntraMuscular once  heparin   Injectable 5000 Unit(s) SubCutaneous every 8 hours  hydrALAZINE 25 milliGRAM(s) Oral every 8 hours  insulin glargine Injectable (LANTUS) 56 Unit(s) SubCutaneous at bedtime  insulin glargine Injectable (LANTUS) 56 Unit(s) SubCutaneous every morning  insulin lispro (ADMELOG) corrective regimen sliding scale   SubCutaneous three times a day before meals  insulin lispro (ADMELOG) corrective regimen sliding scale   SubCutaneous at bedtime  insulin lispro Injectable (ADMELOG) 30 Unit(s) SubCutaneous three times a day before meals  levothyroxine Injectable 25 MICROGram(s) IV Push at bedtime  melatonin 3 milliGRAM(s) Oral at bedtime  metoprolol succinate  milliGRAM(s) Oral daily  milrinone Infusion 0.125 MICROgram(s)/kG/Min (1.99 mL/Hr) IV Continuous <Continuous>  senna 2 Tablet(s) Oral at bedtime    MEDICATIONS  (PRN):  ALPRAZolam 0.25 milliGRAM(s) Oral every 8 hours PRN anxiety  polyethylene glycol 3350 17 Gram(s) Oral two times a day PRN Constipation      CAPILLARY BLOOD GLUCOSE      POCT Blood Glucose.: 157 mg/dL (26 Jan 2021 07:21)  POCT Blood Glucose.: 229 mg/dL (25 Jan 2021 21:20)  POCT Blood Glucose.: 147 mg/dL (25 Jan 2021 16:40)  POCT Blood Glucose.: 133 mg/dL (25 Jan 2021 11:30)    I&O's Summary    25 Jan 2021 07:01  -  26 Jan 2021 07:00  --------------------------------------------------------  IN: 657.9 mL / OUT: 740 mL / NET: -82.1 mL        PHYSICAL EXAM:  Vital Signs Last 24 Hrs  T(C): 36.6 (26 Jan 2021 08:00), Max: 36.6 (25 Jan 2021 19:45)  T(F): 97.9 (26 Jan 2021 08:00), Max: 97.9 (25 Jan 2021 19:45)  HR: 86 (26 Jan 2021 08:00) (80 - 88)  BP: 116/68 (26 Jan 2021 08:00) (95/56 - 116/68)  BP(mean): --  RR: 18 (26 Jan 2021 08:00) (18 - 19)  SpO2: 98% (26 Jan 2021 08:00) (94% - 99%)    PHYSICAL EXAM:  General: deconditioned  Heart: +S1/S2, no murmurs  Pulm: basilar crackles but otherwise clear, no wheeze  Abdomen: soft, non-distended, non tender  Extremities: warm, no edema        LABS:                        10.1   9.65  )-----------( 484      ( 25 Jan 2021 05:56 )             32.4     01-26    136  |  100  |  102<H>  ----------------------------<  134<H>  4.2   |  20<L>  |  2.19<H>    Ca    9.5      26 Jan 2021 07:14  Phos  5.1     01-25  Mg     2.4     01-25                Culture - Urine (collected 24 Jan 2021 16:55)  Source: .Urine Clean Catch (Midstream)  Preliminary Report (25 Jan 2021 13:36):    10,000 - 49,000 CFU/mL Escherichia coli      Procalcitonin, Serum: 0.14 (01-12)    C-Reactive Protein, Serum: 0.63 (01-12)    Ferritin, Serum: 111 (01-13)      D-Dimer Assay, Quantitative: 433 (01-12)      < from: CT Chest No Cont (01.25.21 @ 16:02) >  INTERPRETATION:  Clinical information: Evaluate for pleural effusions. Exam is compared to previous study of 1/13/2021.    CT scan of the chest was obtained without administration of intravenous contrast.    No hilar and/or mediastinal adenopathy is noted.    Heart is enlarged in size. Patient is status post CABG and Jennifer clip placement. No pericardial effusion is noted.    No endobronchial lesions are noted. Evaluation of the lungs is limited due to breathing motion artifact. Minimal atelectasis is noted in the posterior dependent aspects of both lower lobes. No pleural effusions are noted.    Below the diaphragm, visualized portions of the abdomen demonstrate thickening of both adrenal glands. Low-attenuation lesion in the left kidney is indeterminate based on this exam.    Degenerative changes of the spine are noted.    Impression: No pleural effusions are noted.    < end of copied text >

## 2021-01-26 NOTE — PROGRESS NOTE ADULT - PROBLEM SELECTOR PLAN 5
-Takes 60 units of lantus at bedtime and 18 units of humalog   - increased Lantus and Admelog based on sliding scale.  Will continue to titrate to achieve BS goal 140-180  - Endo consulted.  Started BID Lantus dosing 1/21  - finger sticks improving

## 2021-01-26 NOTE — PROGRESS NOTE ADULT - SUBJECTIVE AND OBJECTIVE BOX
[facility] [place] [facility]   MD Dion Dasilva MD, D.O. Ruoru Wong, PA    From 7 AM - 5 PM:  OFFICE: [phone]  Dr. Muhammad cell: [phone]  Dr. Montero cell: [phone]  Dr. Soria cell: [phone]  RASHIDA Smith cell: [phone]    From 5 PM - 7 AM: Answering Service: [phone]  Date of service: 01-26-21 @ 12:34    RENAL FOLLOW UP NOTE  --------------------------------------------------------------------------------  HPI:  Pt seen and examined at bedside.     PAST HISTORY  --------------------------------------------------------------------------------  No significant changes to PMH, PSH, FHx, SHx, unless otherwise noted    ALLERGIES & MEDICATIONS  --------------------------------------------------------------------------------  Allergies    azithromycin (Hives; Pruritus)    Intolerances      Standing Inpatient Medications  aspirin enteric coated 81 milliGRAM(s) Oral daily  atorvastatin 80 milliGRAM(s) Oral at bedtime  baclofen 2.5 milliGRAM(s) Oral once  buMETAnide 2 milliGRAM(s) Oral every 12 hours  calamine/zinc oxide Lotion 1 Application(s) Topical three times a day  clobetasol 0.05% Cream 1 Application(s) Topical every 12 hours  clopidogrel Tablet 75 milliGRAM(s) Oral daily  dextrose 40% Gel 15 Gram(s) Oral once  dextrose 5%. 1000 milliLiter(s) IV Continuous <Continuous>  dextrose 5%. 1000 milliLiter(s) IV Continuous <Continuous>  dextrose 50% Injectable 25 Gram(s) IV Push once  dextrose 50% Injectable 12.5 Gram(s) IV Push once  dextrose 50% Injectable 25 Gram(s) IV Push once  glucagon  Injectable 1 milliGRAM(s) IntraMuscular once  heparin   Injectable 5000 Unit(s) SubCutaneous every 8 hours  hydrALAZINE 25 milliGRAM(s) Oral every 8 hours  insulin glargine Injectable (LANTUS) 56 Unit(s) SubCutaneous at bedtime  insulin glargine Injectable (LANTUS) 56 Unit(s) SubCutaneous every morning  insulin lispro (ADMELOG) corrective regimen sliding scale   SubCutaneous three times a day before meals  insulin lispro (ADMELOG) corrective regimen sliding scale   SubCutaneous at bedtime  insulin lispro Injectable (ADMELOG) 30 Unit(s) SubCutaneous three times a day before meals  levothyroxine Injectable 25 MICROGram(s) IV Push at bedtime  melatonin 3 milliGRAM(s) Oral at bedtime  metoprolol succinate  milliGRAM(s) Oral daily  milrinone Infusion 0.125 MICROgram(s)/kG/Min IV Continuous <Continuous>  senna 2 Tablet(s) Oral at bedtime    PRN Inpatient Medications  ALPRAZolam 0.25 milliGRAM(s) Oral every 8 hours PRN  polyethylene glycol 3350 17 Gram(s) Oral two times a day PRN      REVIEW OF SYSTEMS  --------------------------------------------------------------------------------  General: no fever  CVS: no chest pain  RESP: no sob, no cough  ABD: no abdominal pain  : no dysuria,  MSK: no edema     VITALS/PHYSICAL EXAM  --------------------------------------------------------------------------------  T(C): 36.7 (01-26-21 @ 11:36), Max: 36.7 (01-26-21 @ 11:36)  HR: 85 (01-26-21 @ 11:36) (80 - 89)  BP: 100/58 (01-26-21 @ 11:36) (100/58 - 116/68)  RR: 18 (01-26-21 @ 11:36) (18 - 19)  SpO2: 96% (01-26-21 @ 11:36) (94% - 99%)  Wt(kg): --    Weight (kg): 53 (01-25-21 @ 13:11)      01-25-21 @ 07:01  -  01-26-21 @ 07:00  --------------------------------------------------------  IN: 657.9 mL / OUT: 740 mL / NET: -82.1 mL    01-26-21 @ 07:01  -  01-26-21 @ 12:34  --------------------------------------------------------  IN: 480 mL / OUT: 0 mL / NET: 480 mL      Physical Exam:  	Gen: NAD  	HEENT: MMM  	Pulm: CTA B/L  	CV: S1S2  	Abd: Soft, +BS  	Ext: No LE edema B/L                      Neuro: Awake   	Skin: Warm and Dry       LABS/STUDIES  --------------------------------------------------------------------------------              10.1   9.65  >-----------<  484      [01-25-21 @ 05:56]              32.4     136  |  100  |  102  ----------------------------<  134      [01-26-21 @ 07:14]  4.2   |  20  |  2.19        Ca     9.5     [01-26-21 @ 07:14]      Mg     2.4     [01-25-21 @ 05:56]      Phos  5.1     [01-25-21 @ 05:56]    Creatinine Trend:  SCr 2.19 [01-26 @ 07:14]  SCr 2.41 [01-25 @ 19:43]  SCr 2.32 [01-25 @ 05:56]  SCr 2.08 [01-24 @ 19:57]  SCr 2.18 [01-24 @ 05:58]    Urinalysis - [01-22-21 @ 22:32]      Color Light Yellow / Appearance Clear / SG 1.011 / pH 5.5      Gluc Trace / Ketone Negative  / Bili Negative / Urobili Negative       Blood Negative / Protein 30 mg/dL / Leuk Est Large / Nitrite Negative      RBC 2 / WBC 35 / Hyaline 1 / Gran  / Sq Epi  / Non Sq Epi 2 / Bacteria Negative      Ferritin 111      [01-13-21 @ 01:42]  HbA1c 7.5      [10-08-19 @ 14:30]  TSH 2.15      [01-13-21 @ 10:07]

## 2021-01-26 NOTE — PROGRESS NOTE ADULT - ASSESSMENT
Briefly, 74 yo F with HTN, HLD, DM2 (A1c 7.5 10/19), CAD s/p CABG (LIMA to LAD, SVG to OM, SVG to PDA) and prior PCI, ICM HFrEF (EF 20-25%, LVEDD 4.7 cm), severe mitral regurgitation s/p mitral clip (6/19; mean MV gradient 7-8), severe pulmonary HTN, CKD IV (b/l Cr 1.9-2.2), hypothyroidism who BIBEMS 2/2 worsening work of breathing presents in acute on chronic systolic CHF with NYHA IV functional status. Was notably on midodrine as outpatient since discharge from [facility] in 10/19. Has been having increased work of breathing at home with failure to thrive. Initially required Bipap and was given bumex with some improvement. Continues to be dyspneic. Etiology of her symptoms may be secondary to degree of mitral stenosis given HR in 80-90s with mean gradient of 8 on TTE. Also, per nursing staff some of her dyspnea may be anxiety related. She is s/p RHC 1/20 which showed mildly elevated filling pressures with preserved CO and was started on milrinone to assist with diuresis given her fluctuating renal function.     She currently appears close to euvolemic on exam although without symptomatic improvement. Non contrast chest CT showed mild atelectasis and no pleural effusions. Her BUN and Cr are improving today off diuretics. She is low normotensive tolerating low to moderate dose GDMT.    1/20/20 RHC: RA 15, RV 49/10, PA 50/21 32, PCWP 19, PA 55.4, Ao 98. [place] 3.5/2.5

## 2021-01-26 NOTE — PROGRESS NOTE ADULT - PROBLEM SELECTOR PLAN 1
- Continue to hold diuretics at this time. Will reassess tomorrow.  - Discontinue milrinone drip  - Increase Toprol XL to 100 mg in AM and 50mg in PM; HR goal 60-70  - continue with HDZN 25 mg q8h  - daily standing weight and strict I&Os - Continue to hold diuretics at this time. Will reassess tomorrow.  - Please send proBNP with next labs  - Discontinue milrinone drip  - Increase Toprol XL to 100 mg in AM and 50mg in PM; HR goal 60-70  - continue with HDZN 25 mg q8h  - daily standing weight and strict I&Os

## 2021-01-26 NOTE — PROGRESS NOTE ADULT - ATTENDING COMMENTS
75F w/ PMHx of severe ICM (EF 20%), severe MR s/p mitraclip, HTN, HLD, DM2, CKD b/l CR~2 p/w sob due to acute on chronic chf. Intermittently on o2, but weight is overall down.    Dc milrinone. Start PO diuretics. Inc metoprolol per HF. Trial flexeril low dose for complaints of neck pain. Today appeared comfortable on RA. Became tachypneic during interview when discussing neck pain, then comfortable again. O2 sat >94% on RA at all points.    #acute on chronic HFrEF  -ECG NSR, pvcs, RA deviation, diffuse TWIs and ST deppressions in precordial leads  -tolerating BB  -start oral diuretics  -wean off milrinone  -tele  -cards consult appreciated  -trend LFTs daily  -strict is/os and daily standing weights    #DM2  -titrate insulin prn - lantus bid  -endo following    #rash  -diffuse erythematous, raised pruritic papules  ?severe eczema  -start steroid cream, hydroxycine prn, permetherin per derm  -improving    #decreased LLE pulses - vasc doppler negative  improving, likely approaching euvolemia - weight stable for last 3 days    Tor Newberry MD  Division of Hospital Medicine  Pager: [phone]  Office: [phone]

## 2021-01-26 NOTE — PROGRESS NOTE ADULT - PROBLEM SELECTOR PLAN 1
Patient is not a candidate for tight sugar control given her age and CKD. Will continue current insulin regimen for now. Will continue monitoring FS, log, and FU.  Patient counseled for compliance with consistent low carb diet and exercise as tolerated outpatient.

## 2021-01-26 NOTE — PROGRESS NOTE ADULT - PROBLEM SELECTOR PLAN 1
- 2/2 CHF exacerbation.  EF 20% w/ mitral stenosis.  CXR on admission showed bilateral predominantly lower lobe hazy opacities which is likely 2/2 fluid.  Not presenting w/ clinical signs of PNA and CT chest showed no focal consolidations.  Monitored off abx.  Blood cx also negative  - will c/w Toprol  qd for MS hx; goal 60-70.  If HR still in the 80s on 1/26 will re-fractionate to tartrate and increased to 75 BID  - pt was being treated w/ IV diuresis which was stopped but restarted after RHC 1/20 showed elevated filling pressures. Milrinone drip and Bumex restarted (bladder scan showed evidence of retention (120cc) @ that time).    - Pt now s/p Bumex drip but is still on milrinone drip (dosage decreased 1/25)  - CT chest to assess pulmonary status ordered in the setting of persistent dyspnea  - started PRN xanax for possible anxiety component - 2/2 CHF exacerbation.  EF 20% w/ mitral stenosis.  CXR on admission showed bilateral predominantly lower lobe hazy opacities which is likely 2/2 fluid.  Not presenting w/ clinical signs of PNA and CT chest showed no focal consolidations.  Monitored off abx.  Blood cx also negative  - will c/w Toprol  qd for MS hx; goal 60-70.  If HR still in the 80s on 1/26 will re-fractionate to tartrate and increased to 75 BID  - pt was being treated w/ IV diuresis which was stopped but restarted after RHC 1/20 showed elevated filling pressures. Milrinone drip and Bumex restarted (bladder scan showed evidence of retention (120cc) @ that time).    - Pt now s/p Bumex drip but is still on milrinone drip (dosage decreased 1/25)  - pt w/ persistent dyspnea however CT chest 1/25 shows clear lungs  - started PRN xanax for possible anxiety component

## 2021-01-26 NOTE — PROGRESS NOTE ADULT - ASSESSMENT
76 yo F w/ PMH of CKD stage 4 (baseline Cr 1.9-2.2) HTN, T2DM, CAD s/p CABG X3 (2014 at [facility]), non-dilated ICM (EF 20-25%), severe mitral regurgitation s/p mitral clip (6/13/19), severe pulm HTN, hypothyroidism who presented for evaluation of SOB and was admitted for ADHF.    CKD stage 4  - baseline Cr 1.9-2.2; has had recurrent MERVIN's over the years  - CKD is likely 2/2 recurrent MERVIN's + underlying DM/HTN  - Scr stable today. BUN elevated due to diuretics. Pt switch to oral diuretics today Diuretics per heart failure team   - Avoid hypotension   - renal sonogram in the past with simple renal cyst  - d/w daughter and son-in-law on 01/23 about worsening renal function and if no improvement likely need for dialysis. They were also informed that she will be a poor candidate for HD in view of CHF. They understand but wants HD if needed. Consent for HD is in chart.  - She is not uremic, monitor at present  - Avoid nephrotoxins including NSAIDs, IV contrast (if possible), [employer] products  - monitor I/O's accurately    HTN  BP controlled  Low salt diet   MOnitor BP    Proteinuria/Hematuria  - likely from underlying DM  - has 1.8 grams proteinuria  - Hep B, Hep C, HIV RF, C3, C4, p-ANCA, c-ANCA, RPR neg  - weak gamma-migrating protein, weak IgG lambda protein band noted in the past. Pt to follow w/ heme   - DEAN 1:320 positive  - RPR neg, FTA +

## 2021-01-26 NOTE — PROGRESS NOTE ADULT - SUBJECTIVE AND OBJECTIVE BOX
Chief complaint  Patient is a 75y old  Female who presents with a chief complaint of increased work of breathing (26 Jan 2021 13:07)   Review of systems  Patient in bed, looks comfortable, no hypoglycemic episodes.    Labs and Fingersticks  CAPILLARY BLOOD GLUCOSE      POCT Blood Glucose.: 231 mg/dL (26 Jan 2021 11:49)  POCT Blood Glucose.: 249 mg/dL (26 Jan 2021 09:09)  POCT Blood Glucose.: 157 mg/dL (26 Jan 2021 07:21)  POCT Blood Glucose.: 229 mg/dL (25 Jan 2021 21:20)  POCT Blood Glucose.: 147 mg/dL (25 Jan 2021 16:40)    Medications  MEDICATIONS  (STANDING):  aspirin enteric coated 81 milliGRAM(s) Oral daily  atorvastatin 80 milliGRAM(s) Oral at bedtime  calamine/zinc oxide Lotion 1 Application(s) Topical three times a day  clobetasol 0.05% Cream 1 Application(s) Topical every 12 hours  clopidogrel Tablet 75 milliGRAM(s) Oral daily  dextrose 40% Gel 15 Gram(s) Oral once  dextrose 5%. 1000 milliLiter(s) (50 mL/Hr) IV Continuous <Continuous>  dextrose 5%. 1000 milliLiter(s) (100 mL/Hr) IV Continuous <Continuous>  dextrose 50% Injectable 25 Gram(s) IV Push once  dextrose 50% Injectable 12.5 Gram(s) IV Push once  dextrose 50% Injectable 25 Gram(s) IV Push once  glucagon  Injectable 1 milliGRAM(s) IntraMuscular once  heparin   Injectable 5000 Unit(s) SubCutaneous every 8 hours  hydrALAZINE 25 milliGRAM(s) Oral every 8 hours  insulin glargine Injectable (LANTUS) 56 Unit(s) SubCutaneous at bedtime  insulin glargine Injectable (LANTUS) 56 Unit(s) SubCutaneous every morning  insulin lispro (ADMELOG) corrective regimen sliding scale   SubCutaneous three times a day before meals  insulin lispro (ADMELOG) corrective regimen sliding scale   SubCutaneous at bedtime  insulin lispro Injectable (ADMELOG) 30 Unit(s) SubCutaneous three times a day before meals  levothyroxine Injectable 25 MICROGram(s) IV Push once  melatonin 3 milliGRAM(s) Oral at bedtime  metoprolol succinate  milliGRAM(s) Oral daily  senna 2 Tablet(s) Oral at bedtime      Physical Exam  General: Patient comfortable in bed  Vital Signs Last 12 Hrs  T(F): 98.1 (01-26-21 @ 11:36), Max: 98.1 (01-26-21 @ 11:36)  HR: 89 (01-26-21 @ 13:40) (82 - 89)  BP: 110/68 (01-26-21 @ 13:40) (100/58 - 116/68)  BP(mean): --  RR: 18 (01-26-21 @ 11:36) (18 - 19)  SpO2: 96% (01-26-21 @ 11:36) (96% - 99%)             Chief complaint  Patient is a 75y old  Female who presents with a chief complaint of increased work of breathing (26 Jan 2021 13:07)   Review of systems  Patient in bed, looks comfortable, no hypoglycemic episodes.    Labs and Fingersticks  CAPILLARY BLOOD GLUCOSE    POCT Blood Glucose.: 231 mg/dL (26 Jan 2021 11:49)  POCT Blood Glucose.: 249 mg/dL (26 Jan 2021 09:09)  POCT Blood Glucose.: 157 mg/dL (26 Jan 2021 07:21)  POCT Blood Glucose.: 229 mg/dL (25 Jan 2021 21:20)  POCT Blood Glucose.: 147 mg/dL (25 Jan 2021 16:40)    Medications  MEDICATIONS  (STANDING):  aspirin enteric coated 81 milliGRAM(s) Oral daily  atorvastatin 80 milliGRAM(s) Oral at bedtime  calamine/zinc oxide Lotion 1 Application(s) Topical three times a day  clobetasol 0.05% Cream 1 Application(s) Topical every 12 hours  clopidogrel Tablet 75 milliGRAM(s) Oral daily  dextrose 40% Gel 15 Gram(s) Oral once  dextrose 5%. 1000 milliLiter(s) (50 mL/Hr) IV Continuous <Continuous>  dextrose 5%. 1000 milliLiter(s) (100 mL/Hr) IV Continuous <Continuous>  dextrose 50% Injectable 25 Gram(s) IV Push once  dextrose 50% Injectable 12.5 Gram(s) IV Push once  dextrose 50% Injectable 25 Gram(s) IV Push once  glucagon  Injectable 1 milliGRAM(s) IntraMuscular once  heparin   Injectable 5000 Unit(s) SubCutaneous every 8 hours  hydrALAZINE 25 milliGRAM(s) Oral every 8 hours  insulin glargine Injectable (LANTUS) 56 Unit(s) SubCutaneous at bedtime  insulin glargine Injectable (LANTUS) 56 Unit(s) SubCutaneous every morning  insulin lispro (ADMELOG) corrective regimen sliding scale   SubCutaneous three times a day before meals  insulin lispro (ADMELOG) corrective regimen sliding scale   SubCutaneous at bedtime  insulin lispro Injectable (ADMELOG) 30 Unit(s) SubCutaneous three times a day before meals  levothyroxine Injectable 25 MICROGram(s) IV Push once  melatonin 3 milliGRAM(s) Oral at bedtime  metoprolol succinate  milliGRAM(s) Oral daily  senna 2 Tablet(s) Oral at bedtime      Physical Exam  General: Patient comfortable in bed  Vital Signs Last 12 Hrs  T(F): 98.1 (01-26-21 @ 11:36), Max: 98.1 (01-26-21 @ 11:36)  HR: 89 (01-26-21 @ 13:40) (82 - 89)  BP: 110/68 (01-26-21 @ 13:40) (100/58 - 116/68)  BP(mean): --  RR: 18 (01-26-21 @ 11:36) (18 - 19)  SpO2: 96% (01-26-21 @ 11:36) (96% - 99%)

## 2021-01-27 DIAGNOSIS — M54.2 CERVICALGIA: ICD-10-CM

## 2021-01-27 LAB
ANION GAP SERPL CALC-SCNC: 16 MMOL/L — SIGNIFICANT CHANGE UP (ref 5–17)
BUN SERPL-MCNC: 104 MG/DL — HIGH (ref 7–23)
CALCIUM SERPL-MCNC: 9.9 MG/DL — SIGNIFICANT CHANGE UP (ref 8.4–10.5)
CHLORIDE SERPL-SCNC: 101 MMOL/L — SIGNIFICANT CHANGE UP (ref 96–108)
CO2 SERPL-SCNC: 22 MMOL/L — SIGNIFICANT CHANGE UP (ref 22–31)
CREAT SERPL-MCNC: 2.14 MG/DL — HIGH (ref 0.5–1.3)
GLUCOSE BLDC GLUCOMTR-MCNC: 151 MG/DL — HIGH (ref 70–99)
GLUCOSE BLDC GLUCOMTR-MCNC: 277 MG/DL — HIGH (ref 70–99)
GLUCOSE BLDC GLUCOMTR-MCNC: 288 MG/DL — HIGH (ref 70–99)
GLUCOSE BLDC GLUCOMTR-MCNC: 86 MG/DL — SIGNIFICANT CHANGE UP (ref 70–99)
GLUCOSE SERPL-MCNC: 67 MG/DL — LOW (ref 70–99)
MAGNESIUM SERPL-MCNC: 2.8 MG/DL — HIGH (ref 1.6–2.6)
PHOSPHATE SERPL-MCNC: 5 MG/DL — HIGH (ref 2.5–4.5)
POTASSIUM SERPL-MCNC: 3.9 MMOL/L — SIGNIFICANT CHANGE UP (ref 3.5–5.3)
POTASSIUM SERPL-SCNC: 3.9 MMOL/L — SIGNIFICANT CHANGE UP (ref 3.5–5.3)
SODIUM SERPL-SCNC: 139 MMOL/L — SIGNIFICANT CHANGE UP (ref 135–145)

## 2021-01-27 PROCEDURE — 72125 CT NECK SPINE W/O DYE: CPT | Mod: 26

## 2021-01-27 PROCEDURE — 99233 SBSQ HOSP IP/OBS HIGH 50: CPT

## 2021-01-27 PROCEDURE — 99232 SBSQ HOSP IP/OBS MODERATE 35: CPT | Mod: GC

## 2021-01-27 RX ORDER — INSULIN GLARGINE 100 [IU]/ML
50 INJECTION, SOLUTION SUBCUTANEOUS AT BEDTIME
Refills: 0 | Status: DISCONTINUED | OUTPATIENT
Start: 2021-01-27 | End: 2021-02-01

## 2021-01-27 RX ORDER — INSULIN GLARGINE 100 [IU]/ML
28 INJECTION, SOLUTION SUBCUTANEOUS EVERY MORNING
Refills: 0 | Status: DISCONTINUED | OUTPATIENT
Start: 2021-01-27 | End: 2021-01-28

## 2021-01-27 RX ORDER — METOPROLOL TARTRATE 50 MG
50 TABLET ORAL
Refills: 0 | Status: DISCONTINUED | OUTPATIENT
Start: 2021-01-27 | End: 2021-01-29

## 2021-01-27 RX ORDER — METOPROLOL TARTRATE 50 MG
50 TABLET ORAL DAILY
Refills: 0 | Status: DISCONTINUED | OUTPATIENT
Start: 2021-01-27 | End: 2021-01-27

## 2021-01-27 RX ORDER — METOPROLOL TARTRATE 50 MG
100 TABLET ORAL
Refills: 0 | Status: DISCONTINUED | OUTPATIENT
Start: 2021-01-28 | End: 2021-01-29

## 2021-01-27 RX ORDER — INSULIN LISPRO 100/ML
15 VIAL (ML) SUBCUTANEOUS
Refills: 0 | Status: DISCONTINUED | OUTPATIENT
Start: 2021-01-27 | End: 2021-01-27

## 2021-01-27 RX ORDER — LIDOCAINE 4 G/100G
1 CREAM TOPICAL ONCE
Refills: 0 | Status: COMPLETED | OUTPATIENT
Start: 2021-01-27 | End: 2021-01-27

## 2021-01-27 RX ORDER — INSULIN LISPRO 100/ML
20 VIAL (ML) SUBCUTANEOUS
Refills: 0 | Status: DISCONTINUED | OUTPATIENT
Start: 2021-01-27 | End: 2021-01-29

## 2021-01-27 RX ORDER — ACETAMINOPHEN 500 MG
650 TABLET ORAL EVERY 6 HOURS
Refills: 0 | Status: COMPLETED | OUTPATIENT
Start: 2021-01-27 | End: 2021-01-29

## 2021-01-27 RX ADMIN — Medication 50 MICROGRAM(S): at 04:53

## 2021-01-27 RX ADMIN — HEPARIN SODIUM 5000 UNIT(S): 5000 INJECTION INTRAVENOUS; SUBCUTANEOUS at 04:52

## 2021-01-27 RX ADMIN — INSULIN GLARGINE 50 UNIT(S): 100 INJECTION, SOLUTION SUBCUTANEOUS at 21:14

## 2021-01-27 RX ADMIN — CALAMINE AND ZINC OXIDE AND PHENOL 1 APPLICATION(S): 160; 10 LOTION TOPICAL at 21:09

## 2021-01-27 RX ADMIN — BUMETANIDE 2 MILLIGRAM(S): 0.25 INJECTION INTRAMUSCULAR; INTRAVENOUS at 04:53

## 2021-01-27 RX ADMIN — Medication 25 MILLIGRAM(S): at 12:03

## 2021-01-27 RX ADMIN — CLOPIDOGREL BISULFATE 75 MILLIGRAM(S): 75 TABLET, FILM COATED ORAL at 10:55

## 2021-01-27 RX ADMIN — Medication 100 MILLIGRAM(S): at 04:53

## 2021-01-27 RX ADMIN — SENNA PLUS 2 TABLET(S): 8.6 TABLET ORAL at 21:09

## 2021-01-27 RX ADMIN — Medication 650 MILLIGRAM(S): at 16:37

## 2021-01-27 RX ADMIN — Medication 650 MILLIGRAM(S): at 12:03

## 2021-01-27 RX ADMIN — Medication 1 APPLICATION(S): at 04:52

## 2021-01-27 RX ADMIN — CALAMINE AND ZINC OXIDE AND PHENOL 1 APPLICATION(S): 160; 10 LOTION TOPICAL at 04:52

## 2021-01-27 RX ADMIN — LIDOCAINE 1 PATCH: 4 CREAM TOPICAL at 19:10

## 2021-01-27 RX ADMIN — HEPARIN SODIUM 5000 UNIT(S): 5000 INJECTION INTRAVENOUS; SUBCUTANEOUS at 21:08

## 2021-01-27 RX ADMIN — Medication 2: at 16:37

## 2021-01-27 RX ADMIN — ATORVASTATIN CALCIUM 80 MILLIGRAM(S): 80 TABLET, FILM COATED ORAL at 21:08

## 2021-01-27 RX ADMIN — Medication 6: at 12:02

## 2021-01-27 RX ADMIN — Medication 0.25 MILLIGRAM(S): at 10:55

## 2021-01-27 RX ADMIN — Medication 50 MILLIGRAM(S): at 16:37

## 2021-01-27 RX ADMIN — Medication 2: at 21:08

## 2021-01-27 RX ADMIN — Medication 25 MILLIGRAM(S): at 04:52

## 2021-01-27 RX ADMIN — HEPARIN SODIUM 5000 UNIT(S): 5000 INJECTION INTRAVENOUS; SUBCUTANEOUS at 12:03

## 2021-01-27 RX ADMIN — Medication 3 MILLIGRAM(S): at 21:08

## 2021-01-27 RX ADMIN — Medication 25 MILLIGRAM(S): at 21:08

## 2021-01-27 RX ADMIN — Medication 650 MILLIGRAM(S): at 21:14

## 2021-01-27 RX ADMIN — Medication 20 UNIT(S): at 16:37

## 2021-01-27 RX ADMIN — CALAMINE AND ZINC OXIDE AND PHENOL 1 APPLICATION(S): 160; 10 LOTION TOPICAL at 12:03

## 2021-01-27 RX ADMIN — LIDOCAINE 1 PATCH: 4 CREAM TOPICAL at 16:37

## 2021-01-27 RX ADMIN — Medication 15 UNIT(S): at 12:04

## 2021-01-27 RX ADMIN — Medication 81 MILLIGRAM(S): at 10:55

## 2021-01-27 RX ADMIN — INSULIN GLARGINE 28 UNIT(S): 100 INJECTION, SOLUTION SUBCUTANEOUS at 07:55

## 2021-01-27 NOTE — PROGRESS NOTE ADULT - ATTENDING COMMENTS
Patient seen and examined, agree with above assessment and plan as transcribed above.    - appears at optimal fluid status  - tolerating BB and PO bumex  - D/C planning    Vargas Adame MD, [facility]  BEEPER [phone]

## 2021-01-27 NOTE — PROGRESS NOTE ADULT - SUBJECTIVE AND OBJECTIVE BOX
PROGRESS NOTE:   Authored By: Kayley Luque MD     PGY-2, Internal Medicine  Pager: [phone], [phone]    Patient is a 75y old  Female who presents with a chief complaint of increased work of breathing (26 Jan 2021 14:22)    OVERNIGHT EVENTS: No acute events overnight.    SUBJECTIVE: Pt seen and examined at bedside this morning. Continues to endorse neck pain that is worse with movement. Patient states that her pain level has been constant and unchanged since admission. Reports that he itching and rash have improved. Denies any other complaints this AM.     ADDITIONAL REVIEW OF SYSTEMS:    MEDICATIONS  (STANDING):  aspirin enteric coated 81 milliGRAM(s) Oral daily  atorvastatin 80 milliGRAM(s) Oral at bedtime  buMETAnide 2 milliGRAM(s) Oral daily  calamine/zinc oxide Lotion 1 Application(s) Topical three times a day  clobetasol 0.05% Cream 1 Application(s) Topical every 12 hours  clopidogrel Tablet 75 milliGRAM(s) Oral daily  dextrose 40% Gel 15 Gram(s) Oral once  dextrose 5%. 1000 milliLiter(s) (50 mL/Hr) IV Continuous <Continuous>  dextrose 5%. 1000 milliLiter(s) (100 mL/Hr) IV Continuous <Continuous>  dextrose 50% Injectable 25 Gram(s) IV Push once  dextrose 50% Injectable 12.5 Gram(s) IV Push once  dextrose 50% Injectable 25 Gram(s) IV Push once  glucagon  Injectable 1 milliGRAM(s) IntraMuscular once  heparin   Injectable 5000 Unit(s) SubCutaneous every 8 hours  hydrALAZINE 25 milliGRAM(s) Oral every 8 hours  insulin glargine Injectable (LANTUS) 28 Unit(s) SubCutaneous every morning  insulin glargine Injectable (LANTUS) 50 Unit(s) SubCutaneous at bedtime  insulin lispro (ADMELOG) corrective regimen sliding scale   SubCutaneous three times a day before meals  insulin lispro (ADMELOG) corrective regimen sliding scale   SubCutaneous at bedtime  insulin lispro Injectable (ADMELOG) 15 Unit(s) SubCutaneous three times a day before meals  levothyroxine 50 MICROGram(s) Oral daily  melatonin 3 milliGRAM(s) Oral at bedtime  metoprolol succinate ER 50 milliGRAM(s) Oral <User Schedule>  senna 2 Tablet(s) Oral at bedtime    MEDICATIONS  (PRN):  ALPRAZolam 0.25 milliGRAM(s) Oral every 8 hours PRN anxiety  baclofen 2.5 milliGRAM(s) Oral once PRN back and neck pain  polyethylene glycol 3350 17 Gram(s) Oral two times a day PRN Constipation    CAPILLARY BLOOD GLUCOSE    POCT Blood Glucose.: 86 mg/dL (27 Jan 2021 07:25)  POCT Blood Glucose.: 124 mg/dL (26 Jan 2021 21:21)  POCT Blood Glucose.: 181 mg/dL (26 Jan 2021 16:33)  POCT Blood Glucose.: 231 mg/dL (26 Jan 2021 11:49)    I&O's Summary    26 Jan 2021 07:01  -  27 Jan 2021 07:00  --------------------------------------------------------  IN: 840 mL / OUT: 950 mL / NET: -110 mL    PHYSICAL EXAM:  Vital Signs Last 24 Hrs  T(C): 36.6 (27 Jan 2021 08:00), Max: 36.7 (26 Jan 2021 11:36)  T(F): 97.9 (27 Jan 2021 08:00), Max: 98.1 (26 Jan 2021 11:36)  HR: 80 (27 Jan 2021 08:00) (80 - 89)  BP: 115/68 (27 Jan 2021 08:00) (100/58 - 115/68)  BP(mean): --  RR: 18 (27 Jan 2021 08:00) (18 - 18)  SpO2: 95% (27 Jan 2021 08:00) (94% - 96%)    General: deconditioned, appearing uncomfortable, conversant, answering questions appropriately  HEENT: eyes tracking appropriately  CVS: +S1/S2, no murmurs  Resp: basilar crackles but otherwise clear, no wheezes apprecited  Abdomen: soft, non-distended, non tender  MSK: TTP over neck and proximal shoulders b/l, limited neck ROM 2/2 pain  Extremities: warm, no edema, no TTP over lower extremities    LABS:    01-27    139  |  101  |  104<H>  ----------------------------<  67<L>  3.9   |  22  |  2.14<H>    Ca    9.9      27 Jan 2021 06:31  Phos  5.0     01-27  Mg     2.8     01-27    Culture - Urine (collected 24 Jan 2021 16:55)  Source: .Urine Clean Catch (Midstream)  Final Report (26 Jan 2021 09:46):    10,000 - 49,000 CFU/mL Escherichia coli  Organism: Escherichia coli (26 Jan 2021 09:46)  Organism: Escherichia coli (26 Jan 2021 09:46)    RADIOLOGY & ADDITIONAL TESTS:    Results Reviewed:   Imaging Personally Reviewed:  Electrocardiogram Personally Reviewed:    COORDINATION OF CARE:  Care Discussed with Consultants/Other Providers [Y/N]:  Prior or Outpatient Records Reviewed [Y/N]:

## 2021-01-27 NOTE — PROGRESS NOTE ADULT - SUBJECTIVE AND OBJECTIVE BOX
Subjective:  - Reporting neck pain for 3-4 days    Medications:  acetaminophen   Tablet .. 650 milliGRAM(s) Oral every 6 hours  ALPRAZolam 0.25 milliGRAM(s) Oral every 8 hours PRN  aspirin enteric coated 81 milliGRAM(s) Oral daily  atorvastatin 80 milliGRAM(s) Oral at bedtime  baclofen 2.5 milliGRAM(s) Oral once PRN  buMETAnide 2 milliGRAM(s) Oral daily  calamine/zinc oxide Lotion 1 Application(s) Topical three times a day  clobetasol 0.05% Cream 1 Application(s) Topical every 12 hours  clopidogrel Tablet 75 milliGRAM(s) Oral daily  dextrose 40% Gel 15 Gram(s) Oral once  dextrose 5%. 1000 milliLiter(s) IV Continuous <Continuous>  dextrose 5%. 1000 milliLiter(s) IV Continuous <Continuous>  dextrose 50% Injectable 25 Gram(s) IV Push once  dextrose 50% Injectable 12.5 Gram(s) IV Push once  dextrose 50% Injectable 25 Gram(s) IV Push once  glucagon  Injectable 1 milliGRAM(s) IntraMuscular once  heparin   Injectable 5000 Unit(s) SubCutaneous every 8 hours  hydrALAZINE 25 milliGRAM(s) Oral every 8 hours  insulin glargine Injectable (LANTUS) 50 Unit(s) SubCutaneous at bedtime  insulin glargine Injectable (LANTUS) 28 Unit(s) SubCutaneous every morning  insulin lispro (ADMELOG) corrective regimen sliding scale   SubCutaneous three times a day before meals  insulin lispro (ADMELOG) corrective regimen sliding scale   SubCutaneous at bedtime  insulin lispro Injectable (ADMELOG) 15 Unit(s) SubCutaneous three times a day before meals  levothyroxine 50 MICROGram(s) Oral daily  lidocaine   Patch 1 Patch Transdermal once  melatonin 3 milliGRAM(s) Oral at bedtime  metoprolol succinate ER 50 milliGRAM(s) Oral <User Schedule>  polyethylene glycol 3350 17 Gram(s) Oral two times a day PRN  senna 2 Tablet(s) Oral at bedtime      Physical Exam:    Vitals:  Vital Signs Last 24 Hours  T(C): 36.6 (21 @ 08:00), Max: 36.7 (21 @ 11:36)  HR: 80 (21 @ 08:00) (80 - 89)  BP: 115/68 (21 @ 08:00) (100/58 - 115/68)  RR: 18 (21 @ 08:00) (18 - 18)  SpO2: 95% (21 @ 08:00) (94% - 96%)    Weight in k ( @ 08:00)    I&O's Summary    2021 07:  -  2021 07:00  --------------------------------------------------------  IN: 840 mL / OUT: 950 mL / NET: -110 mL    Tele: SR 1st deg AVB HR 80-90s    General: Frail. Uncomfortable, moaning in pain  HEENT: EOM intact.  Neck: Resistant to turning/moving neck due to pain. JVP difficult to assess as she would not move her head however does not appear significantly elevated. No masses  Chest: tachypneic, CTA  CV: Regular, Normal S1 and S2. II/VI SM. Radial pulses normal. No LE edema.  Abdomen: Soft, non-distended, non-tender  Skin: No rashes or skin breakdown. Warm peripherally  Neurology: Alert and oriented times three. Sensation intact  Psych: Depressed mood, anxious    Labs:        139  |  101  |  104<H>  ----------------------------<  67<L>  3.9   |  22  |  2.14<H>    Ca    9.9      2021 06:31  Phos  5.0       Mg     2.8

## 2021-01-27 NOTE — PROGRESS NOTE ADULT - PROBLEM SELECTOR PLAN 3
Raised urticarial rash over UE and back   -Derm consulted, recs as below:  -clobetasol 0.05% ointment BID   -permethrin 5% cream, apply to whole body from neck down at bedtime x 1 night and rinse off in morning (8-10 hours later). Repeat again in 1 week  -symptoms improved this AM

## 2021-01-27 NOTE — PROGRESS NOTE ADULT - SUBJECTIVE AND OBJECTIVE BOX
Chief complaint  Patient is a 75y old  Female who presents with a chief complaint of increased work of breathing (27 Jan 2021 13:45)   Review of systems  Patient c/o ear pain, appears SOB, had mild hypoglycemia this AM.    Labs and Fingersticks  CAPILLARY BLOOD GLUCOSE      POCT Blood Glucose.: 288 mg/dL (27 Jan 2021 11:39)  POCT Blood Glucose.: 86 mg/dL (27 Jan 2021 07:25)  POCT Blood Glucose.: 124 mg/dL (26 Jan 2021 21:21)  POCT Blood Glucose.: 181 mg/dL (26 Jan 2021 16:33)    Medications  MEDICATIONS  (STANDING):  acetaminophen   Tablet .. 650 milliGRAM(s) Oral every 6 hours  aspirin enteric coated 81 milliGRAM(s) Oral daily  atorvastatin 80 milliGRAM(s) Oral at bedtime  buMETAnide 2 milliGRAM(s) Oral daily  calamine/zinc oxide Lotion 1 Application(s) Topical three times a day  clobetasol 0.05% Cream 1 Application(s) Topical every 12 hours  clopidogrel Tablet 75 milliGRAM(s) Oral daily  dextrose 40% Gel 15 Gram(s) Oral once  dextrose 5%. 1000 milliLiter(s) (50 mL/Hr) IV Continuous <Continuous>  dextrose 5%. 1000 milliLiter(s) (100 mL/Hr) IV Continuous <Continuous>  dextrose 50% Injectable 25 Gram(s) IV Push once  dextrose 50% Injectable 12.5 Gram(s) IV Push once  dextrose 50% Injectable 25 Gram(s) IV Push once  glucagon  Injectable 1 milliGRAM(s) IntraMuscular once  heparin   Injectable 5000 Unit(s) SubCutaneous every 8 hours  hydrALAZINE 25 milliGRAM(s) Oral every 8 hours  insulin glargine Injectable (LANTUS) 50 Unit(s) SubCutaneous at bedtime  insulin glargine Injectable (LANTUS) 28 Unit(s) SubCutaneous every morning  insulin lispro (ADMELOG) corrective regimen sliding scale   SubCutaneous three times a day before meals  insulin lispro (ADMELOG) corrective regimen sliding scale   SubCutaneous at bedtime  insulin lispro Injectable (ADMELOG) 20 Unit(s) SubCutaneous three times a day before meals  levothyroxine 50 MICROGram(s) Oral daily  lidocaine   Patch 1 Patch Transdermal once  melatonin 3 milliGRAM(s) Oral at bedtime  metoprolol succinate ER 50 milliGRAM(s) Oral <User Schedule>  senna 2 Tablet(s) Oral at bedtime      Physical Exam  General: Patient comfortable in bed  Vital Signs Last 12 Hrs  T(F): 97.9 (01-27-21 @ 11:31), Max: 97.9 (01-27-21 @ 08:00)  HR: 90 (01-27-21 @ 11:31) (80 - 90)  BP: 117/69 (01-27-21 @ 11:31) (109/65 - 117/69)  BP(mean): --  RR: 18 (01-27-21 @ 11:31) (18 - 18)       Chief complaint  Patient is a 75y old  Female who presents with a chief complaint of increased work of breathing (27 Jan 2021 13:45)   Review of systems  Patient c/o ear pain, appears SOB, had mild hypoglycemia this AM.    Labs and Fingersticks  CAPILLARY BLOOD GLUCOSE    POCT Blood Glucose.: 288 mg/dL (27 Jan 2021 11:39)  POCT Blood Glucose.: 86 mg/dL (27 Jan 2021 07:25)  POCT Blood Glucose.: 124 mg/dL (26 Jan 2021 21:21)  POCT Blood Glucose.: 181 mg/dL (26 Jan 2021 16:33)    Medications  MEDICATIONS  (STANDING):  acetaminophen   Tablet .. 650 milliGRAM(s) Oral every 6 hours  aspirin enteric coated 81 milliGRAM(s) Oral daily  atorvastatin 80 milliGRAM(s) Oral at bedtime  buMETAnide 2 milliGRAM(s) Oral daily  calamine/zinc oxide Lotion 1 Application(s) Topical three times a day  clobetasol 0.05% Cream 1 Application(s) Topical every 12 hours  clopidogrel Tablet 75 milliGRAM(s) Oral daily  dextrose 40% Gel 15 Gram(s) Oral once  dextrose 5%. 1000 milliLiter(s) (50 mL/Hr) IV Continuous <Continuous>  dextrose 5%. 1000 milliLiter(s) (100 mL/Hr) IV Continuous <Continuous>  dextrose 50% Injectable 25 Gram(s) IV Push once  dextrose 50% Injectable 12.5 Gram(s) IV Push once  dextrose 50% Injectable 25 Gram(s) IV Push once  glucagon  Injectable 1 milliGRAM(s) IntraMuscular once  heparin   Injectable 5000 Unit(s) SubCutaneous every 8 hours  hydrALAZINE 25 milliGRAM(s) Oral every 8 hours  insulin glargine Injectable (LANTUS) 50 Unit(s) SubCutaneous at bedtime  insulin glargine Injectable (LANTUS) 28 Unit(s) SubCutaneous every morning  insulin lispro (ADMELOG) corrective regimen sliding scale   SubCutaneous three times a day before meals  insulin lispro (ADMELOG) corrective regimen sliding scale   SubCutaneous at bedtime  insulin lispro Injectable (ADMELOG) 20 Unit(s) SubCutaneous three times a day before meals  levothyroxine 50 MICROGram(s) Oral daily  lidocaine   Patch 1 Patch Transdermal once  melatonin 3 milliGRAM(s) Oral at bedtime  metoprolol succinate ER 50 milliGRAM(s) Oral <User Schedule>  senna 2 Tablet(s) Oral at bedtime      Physical Exam  General: Patient comfortable in bed  Vital Signs Last 12 Hrs  T(F): 97.9 (01-27-21 @ 11:31), Max: 97.9 (01-27-21 @ 08:00)  HR: 90 (01-27-21 @ 11:31) (80 - 90)  BP: 117/69 (01-27-21 @ 11:31) (109/65 - 117/69)  BP(mean): --  RR: 18 (01-27-21 @ 11:31) (18 - 18)

## 2021-01-27 NOTE — PROGRESS NOTE ADULT - SUBJECTIVE AND OBJECTIVE BOX
S: Resting comfortably on room air. Still complaining of neck pain. Denies chest pain or SOB. Review of systems otherwise (-)    Review of Systems:   Constitutional: [ ] fevers, [ ] chills.   Skin: [ ] dry skin. [ ] rashes.  Psychiatric: [ ] depression, [ ] anxiety.   Gastrointestinal: [ ] BRBPR, [ ] melena.   Neurological: [ ] confusion. [ ] seizures. [ ] shuffling gait.   Ears,Nose,Mouth and Throat: [ ] ear pain [ ] sore throat.   Eyes: [ ] diplopia.   Respiratory: [ ] hemoptysis. [ ] shortness of breath  Cardiovascular: See HPI above  Hematologic/Lymphatic: [ ] anemia. [ ] painful nodes. [ ] prolonged bleeding.   Genitourinary: [ ] hematuria. [ ] flank pain.   Endocrine: [ ] significant change in weight. [ ] intolerance to heat and cold.     Review of systems [ x] otherwise negative, [ ] otherwise unable to obtain    FH: no family history of sudden cardiac death in first degree relatives    SH: [ ] tobacco, [ ] alcohol, [ ] drugs       MEDICATIONS  (STANDING):  acetaminophen   Tablet .. 650 milliGRAM(s) Oral every 6 hours  aspirin enteric coated 81 milliGRAM(s) Oral daily  atorvastatin 80 milliGRAM(s) Oral at bedtime  buMETAnide 2 milliGRAM(s) Oral daily  calamine/zinc oxide Lotion 1 Application(s) Topical three times a day  clobetasol 0.05% Cream 1 Application(s) Topical every 12 hours  clopidogrel Tablet 75 milliGRAM(s) Oral daily  dextrose 40% Gel 15 Gram(s) Oral once  dextrose 5%. 1000 milliLiter(s) (50 mL/Hr) IV Continuous <Continuous>  dextrose 5%. 1000 milliLiter(s) (100 mL/Hr) IV Continuous <Continuous>  dextrose 50% Injectable 25 Gram(s) IV Push once  dextrose 50% Injectable 12.5 Gram(s) IV Push once  dextrose 50% Injectable 25 Gram(s) IV Push once  glucagon  Injectable 1 milliGRAM(s) IntraMuscular once  heparin   Injectable 5000 Unit(s) SubCutaneous every 8 hours  hydrALAZINE 25 milliGRAM(s) Oral every 8 hours  insulin glargine Injectable (LANTUS) 50 Unit(s) SubCutaneous at bedtime  insulin glargine Injectable (LANTUS) 28 Unit(s) SubCutaneous every morning  insulin lispro (ADMELOG) corrective regimen sliding scale   SubCutaneous three times a day before meals  insulin lispro (ADMELOG) corrective regimen sliding scale   SubCutaneous at bedtime  insulin lispro Injectable (ADMELOG) 20 Unit(s) SubCutaneous three times a day before meals  levothyroxine 50 MICROGram(s) Oral daily  lidocaine   Patch 1 Patch Transdermal once  melatonin 3 milliGRAM(s) Oral at bedtime  metoprolol succinate ER 50 milliGRAM(s) Oral <User Schedule>  senna 2 Tablet(s) Oral at bedtime    MEDICATIONS  (PRN):  ALPRAZolam 0.25 milliGRAM(s) Oral every 8 hours PRN anxiety  baclofen 2.5 milliGRAM(s) Oral once PRN back and neck pain  polyethylene glycol 3350 17 Gram(s) Oral two times a day PRN Constipation      LABS:      Hemoglobin: 10.1 g/dL (01-25 @ 05:56)  Hemoglobin: 10.4 g/dL (01-24 @ 05:58)  Hemoglobin: 9.8 g/dL (01-23 @ 04:07)    01-27    139  |  101  |  104<H>  ----------------------------<  67<L>  3.9   |  22  |  2.14<H>    Ca    9.9      27 Jan 2021 06:31  Phos  5.0     01-27  Mg     2.8     01-27      Creatinine Trend: 2.14<--, 2.19<--, 2.41<--, 2.32<--, 2.08<--, 2.18<--             01-26-21 @ 07:01  -  01-27-21 @ 07:00  --------------------------------------------------------  IN: 840 mL / OUT: 950 mL / NET: -110 mL        PHYSICAL EXAM  Vital Signs Last 24 Hrs  T(C): 36.6 (27 Jan 2021 11:31), Max: 36.7 (26 Jan 2021 19:53)  T(F): 97.9 (27 Jan 2021 11:31), Max: 98.1 (26 Jan 2021 19:53)  HR: 90 (27 Jan 2021 11:31) (80 - 90)  BP: 117/69 (27 Jan 2021 11:31) (107/63 - 117/69)  BP(mean): --  RR: 18 (27 Jan 2021 11:31) (18 - 18)  SpO2: 97% (27 Jan 2021 11:31) (94% - 97%)      General: Well nourished in no acute distress. Alert and Oriented * 3.   Head: Normocephalic and atraumatic.   Neck: No JVD. No bruits. Supple. Does not appear to be enlarged.   Cardiovascular: + S1,S2 ; RRR Soft systolic murmur at the left lower sternal border. No rubs noted.    Lungs: CTA b/l. No rhonchi, rales or wheezes.   Abdomen: + BS, soft. Non tender. Non distended. No rebound. No guarding.   Extremities: No clubbing/cyanosis/edema.   Neurologic: Moves all four extremities. Full range of motion.   Skin: Warm and moist. The patient's skin has normal elasticity and good skin turgor.   Psychiatric: Appropriate mood and affect.  Musculoskeletal: Normal range of motion, normal strength    TELEMETRY: SR 80-90    < from: Cardiac Cath Lab - Adult (01.20.21 @ 10:22) >  PROCEDURE:  --  Right heart catheterization.  --  Sonosite - Diagnostic.  COMPLICATIONS: There were no complications.  DIAGNOSTIC RECOMMENDATIONS: The patient should continuewith the present  medications.  Prepared and signed by  Cesar Jackson M.D.       A/P) 74 y/o female PMH HTN, HLD, DM, CAD s/p CABG 2014 and prior PCI, severe mitral regurgitation (s/p mitral clip 2019), pulmonary HTN, HF (EF 21 % June 2019), CKD IV not on dialysis (b/l SCr 2-2.2), hypothyroidism a/w acute on chronic systolic CHF with NYHA IV functional status.    -neck pain w/u per primary team - CT C spine pending  -repeat echo noted but didn't reassess degree of mitral stenosis  -Continue dapt/statin   -RHC noted above - Filling pressures are appropriate considering LV function and the mitral valve gradient  -PO bumex resumed  -Continue Toprol XL  -trend creatinine  -very poor candidate for a primary prevention ICD  -no further inpatient cardiac w/u planned  -f/u with Dr. Sotelo on Friday 1/29 at 12 PM     Amauri Hill PA-C  Pager: [phone]

## 2021-01-27 NOTE — PROGRESS NOTE ADULT - ASSESSMENT
Briefly, 76 yo F with HTN, HLD, DM2 (A1c 7.5 10/19), CAD s/p CABG (LIMA to LAD, SVG to OM, SVG to PDA) and prior PCI, ICM HFrEF (EF 20-25%, LVEDD 4.7 cm), severe mitral regurgitation s/p mitral clip (6/19; mean MV gradient 7-8), severe pulmonary HTN, CKD IV (b/l Cr 1.9-2.2), hypothyroidism who BIBEMS 2/2 worsening work of breathing presents in acute on chronic systolic CHF with NYHA IV functional status. Was notably on midodrine as outpatient since discharge from [facility] in 10/19. Has been having increased work of breathing at home with failure to thrive. Initially required Bipap and was given bumex with some improvement. Continues to be dyspneic. Etiology of her symptoms may be secondary to degree of mitral stenosis given HR in 80-90s with mean gradient of 8 on TTE. Also, per nursing staff some of her dyspnea may be anxiety related. She is s/p RHC 1/20 which showed mildly elevated filling pressures with preserved CO and was started on milrinone to assist with diuresis given her fluctuating renal function.     She currently appears close to euvolemic on exam although without symptomatic improvement. Her BUN/Cr are downtrending and she is low normotensive off milrinone tolerating low dose vasodilators. Tachypnea appears to be related to pain/anxiety.      1/20/20 RHC: RA 15, RV 49/10, PA 50/21 32, PCWP 19, PA 55.4, Ao 98. [place] 3.5/2.5

## 2021-01-27 NOTE — PROGRESS NOTE ADULT - ASSESSMENT
76 yo F w/ PMH of CKD stage 4 (baseline Cr 1.9-2.2) HTN, T2DM, CAD s/p CABG X3 (2014 at [facility]), non-dilated ICM (EF 20-25%), severe mitral regurgitation s/p mitral clip (6/13/19), severe pulm HTN, hypothyroidism who presented for evaluation of SOB and was admitted for ADHF.    CKD stage 4  - baseline Cr 1.9-2.2; has had recurrent MERVIN's over the years  - CKD is likely 2/2 recurrent MERVIN's + underlying DM/HTN  - Scr stable today. BUN elevated due to diuretics. Pt switch to oral diuretics today Diuretics per heart failure team   - Avoid hypotension   - renal sonogram in the past with simple renal cyst  - d/w daughter and son-in-law on 01/23 about worsening renal function and if no improvement likely need for dialysis. They were also informed that she will be a poor candidate for HD in view of CHF. They understand but wants HD if needed. Consent for HD is in chart.  - She is not uremic, monitor at present  - Avoid nephrotoxins including NSAIDs, IV contrast (if possible), [employer] products  - monitor I/O's accurately    HTN  BP controlled  Low salt diet   MOnitor BP    Proteinuria/Hematuria  - likely from underlying DM  - has 1.8 grams proteinuria  - Hep B, Hep C, HIV RF, C3, C4, p-ANCA, c-ANCA, RPR neg  - weak gamma-migrating protein, weak IgG lambda protein band noted in the past. Pt to follow w/ heme   - DEAN 1:320 positive  - RPR neg, FTA +

## 2021-01-27 NOTE — PROGRESS NOTE ADULT - PROBLEM SELECTOR PLAN 1
- Continue current dose of bumex 2mg PO daily  - Please send proBNP with next labs  - Continue with Toprol XL to 100 mg in AM and 50mg in PM; HR goal 60-70  - continue with HDZN 25 mg q8h  - daily standing weight and strict I&Os

## 2021-01-27 NOTE — PROGRESS NOTE ADULT - PROBLEM SELECTOR PLAN 5
-Takes 60 units of lantus at bedtime and 18 units of humalog   - increased Lantus and Admelog based on sliding scale.  Will continue to titrate to achieve BS goal 140-180  - Endo consulted.  Started BID Lantus dosing 1/21  - lantus reduced to 28U in AM and admelog reduced to 15u premeal 2/2 FS of 80s in AM. Most likely due to LV failure   -repeat TTE shows mild pulm HTN w/ normal RV size but decreased function

## 2021-01-27 NOTE — PROGRESS NOTE ADULT - PROBLEM SELECTOR PLAN 4
Most likely due to LV failure   -repeat TTE shows mild pulm HTN w/ normal RV size but decreased function Patient with persistent neck pain  Pain increased with movement, and with tenderness to palpation  Most likely MSK in nature  - pain control with tylenol ATC and lidocaine patch  - will obtain CT cervical spine to r/o fracture/herniation

## 2021-01-27 NOTE — PROGRESS NOTE ADULT - PROBLEM SELECTOR PLAN 1
Patient is not a candidate for tight sugar control given her age and CKD. Will decrease Lantus to be 50u at bedtime.  Will continue Lantus 28u AM. Will continue decreased-dose Admelog 20u before each meal as well as coverage scale. Will continue monitoring FS, log, and FU.  Patient counseled for compliance with consistent low carb diet.

## 2021-01-27 NOTE — PROGRESS NOTE ADULT - PROBLEM SELECTOR PLAN 1
- 2/2 CHF exacerbation.  EF 20% w/ mitral stenosis.  CXR on admission showed bilateral predominantly lower lobe hazy opacities which is likely 2/2 fluid.  Not presenting w/ clinical signs of PNA and CT chest showed no focal consolidations.  Monitored off abx.  Blood cx also negative  - will c/w Toprol  qd for MS hx; goal 60-70.  If HR still in the 80s on 1/26 will re-fractionate to tartrate and increased to 75 BID  - pt was being treated w/ IV diuresis which was stopped but restarted after RHC 1/20 showed elevated filling pressures. Milrinone drip and Bumex restarted (bladder scan showed evidence of retention (120cc) @ that time).    - Pt now s/p Bumex drip but is still on milrinone drip (dosage decreased 1/25)  - pt w/ persistent dyspnea however CT chest 1/25 shows clear lungs  - started PRN xanax for possible anxiety component

## 2021-01-27 NOTE — PROGRESS NOTE ADULT - SUBJECTIVE AND OBJECTIVE BOX
[facility]   MD Dion Dasilva MD, D.O. Ruoru Wong, PA    From 7 AM - 5 PM:  OFFICE: [phone]  Dr. Muhammad cell: [phone]  Dr. Montero cell: [phone]  Dr. Soria cell: [phone]  RASHIDA Smith cell: [phone]    From 5 PM - 7 AM: Answering Service: [phone]  Date of service: 01-27-21 @ 13:46    RENAL FOLLOW UP NOTE  --------------------------------------------------------------------------------  HPI:  Pt seen and examined at bedside.   Denies SOB, chest pain     PAST HISTORY  --------------------------------------------------------------------------------  No significant changes to PMH, PSH, FHx, SHx, unless otherwise noted    ALLERGIES & MEDICATIONS  --------------------------------------------------------------------------------  Allergies    azithromycin (Hives; Pruritus)    Intolerances      Standing Inpatient Medications  acetaminophen   Tablet .. 650 milliGRAM(s) Oral every 6 hours  aspirin enteric coated 81 milliGRAM(s) Oral daily  atorvastatin 80 milliGRAM(s) Oral at bedtime  buMETAnide 2 milliGRAM(s) Oral daily  calamine/zinc oxide Lotion 1 Application(s) Topical three times a day  clobetasol 0.05% Cream 1 Application(s) Topical every 12 hours  clopidogrel Tablet 75 milliGRAM(s) Oral daily  dextrose 40% Gel 15 Gram(s) Oral once  dextrose 5%. 1000 milliLiter(s) IV Continuous <Continuous>  dextrose 5%. 1000 milliLiter(s) IV Continuous <Continuous>  dextrose 50% Injectable 25 Gram(s) IV Push once  dextrose 50% Injectable 12.5 Gram(s) IV Push once  dextrose 50% Injectable 25 Gram(s) IV Push once  glucagon  Injectable 1 milliGRAM(s) IntraMuscular once  heparin   Injectable 5000 Unit(s) SubCutaneous every 8 hours  hydrALAZINE 25 milliGRAM(s) Oral every 8 hours  insulin glargine Injectable (LANTUS) 50 Unit(s) SubCutaneous at bedtime  insulin glargine Injectable (LANTUS) 28 Unit(s) SubCutaneous every morning  insulin lispro (ADMELOG) corrective regimen sliding scale   SubCutaneous three times a day before meals  insulin lispro (ADMELOG) corrective regimen sliding scale   SubCutaneous at bedtime  insulin lispro Injectable (ADMELOG) 20 Unit(s) SubCutaneous three times a day before meals  levothyroxine 50 MICROGram(s) Oral daily  lidocaine   Patch 1 Patch Transdermal once  melatonin 3 milliGRAM(s) Oral at bedtime  metoprolol succinate ER 50 milliGRAM(s) Oral <User Schedule>  senna 2 Tablet(s) Oral at bedtime    PRN Inpatient Medications  ALPRAZolam 0.25 milliGRAM(s) Oral every 8 hours PRN  baclofen 2.5 milliGRAM(s) Oral once PRN  polyethylene glycol 3350 17 Gram(s) Oral two times a day PRN      REVIEW OF SYSTEMS  --------------------------------------------------------------------------------  General: no fever  CVS: no chest pain  RESP: no sob, no cough  ABD: no abdominal pain  : no dysuria,  MSK: no edema     VITALS/PHYSICAL EXAM  --------------------------------------------------------------------------------  T(C): 36.6 (01-27-21 @ 11:31), Max: 36.7 (01-26-21 @ 19:53)  HR: 90 (01-27-21 @ 11:31) (80 - 90)  BP: 117/69 (01-27-21 @ 11:31) (107/63 - 117/69)  RR: 18 (01-27-21 @ 11:31) (18 - 18)  SpO2: 97% (01-27-21 @ 11:31) (94% - 97%)  Wt(kg): --        01-26-21 @ 07:01  -  01-27-21 @ 07:00  --------------------------------------------------------  IN: 840 mL / OUT: 950 mL / NET: -110 mL    01-27-21 @ 07:01  -  01-27-21 @ 13:46  --------------------------------------------------------  IN: 480 mL / OUT: 0 mL / NET: 480 mL      Physical Exam:  	Gen: NAD  	HEENT: MMM  	Pulm: CTA B/L  	CV: S1S2  	Abd: Soft, +BS  	Ext: No LE edema B/L                      Neuro: Awake   	Skin: Warm and Dry       LABS/STUDIES  --------------------------------------------------------------------------------    139  |  101  |  104  ----------------------------<  67      [01-27-21 @ 06:31]  3.9   |  22  |  2.14        Ca     9.9     [01-27-21 @ 06:31]      Mg     2.8     [01-27-21 @ 06:31]      Phos  5.0     [01-27-21 @ 06:31]    Creatinine Trend:  SCr 2.14 [01-27 @ 06:31]  SCr 2.19 [01-26 @ 07:14]  SCr 2.41 [01-25 @ 19:43]  SCr 2.32 [01-25 @ 05:56]  SCr 2.08 [01-24 @ 19:57]    Urinalysis - [01-22-21 @ 22:32]      Color Light Yellow / Appearance Clear / SG 1.011 / pH 5.5      Gluc Trace / Ketone Negative  / Bili Negative / Urobili Negative       Blood Negative / Protein 30 mg/dL / Leuk Est Large / Nitrite Negative      RBC 2 / WBC 35 / Hyaline 1 / Gran  / Sq Epi  / Non Sq Epi 2 / Bacteria Negative      Ferritin 111      [01-13-21 @ 01:42]  HbA1c 7.5      [10-08-19 @ 14:30]  TSH 2.15      [01-13-21 @ 10:07]

## 2021-01-27 NOTE — PROGRESS NOTE ADULT - ASSESSMENT
Assessment  DMT2: 75y Female with DM T2 with hyperglycemia, A1C 7.1%, was on insulin at home, now on basal bolus insulin, patient had hypoglycemic episode this AM, FS fluctuating, insulin dose adjusted by primary team, she is eating partial meals, appears more SOB today.  HF: on medications, stable, monitored, FU Cardiology.  Hypothyroidism: On Synthroid 50 mcg po daily, compliant with Synthroid intake, asymptomatic, euthyroid.  CKD: Monitor labs/BMP      Jillian Banks MD  Cell: [phone]  Office: [phone]       Assessment  DMT2: 75y Female with DM T2 with hyperglycemia, A1C 7.1%, was on insulin at home, now on basal bolus insulin,  patient had hypoglycemic episode this AM, FS fluctuating, insulin dose adjusted by primary team, she is eating partial meals, appears more SOB today.  HF: on medications, stable, monitored, FU Cardiology.  Hypothyroidism: On Synthroid 50 mcg po daily, compliant with Synthroid intake, asymptomatic, euthyroid.  CKD: Monitor labs/BMP      Jillian Banks MD  Cell: [phone]  Office: [phone]

## 2021-01-27 NOTE — PROGRESS NOTE ADULT - PROBLEM SELECTOR PLAN 2
-TTE 1/13 showed EF of 20% with global LV and RV dysfunction w/ MS.   Repeat TTE 1/19 showed severely decreased LV fx and grossly decreased RV fx w/ EF 25-30%  - Heart failure and cardiology following  - see above for history.    - c/w decreased dosage of milrinone  - s/p bumex drip.  Started back on home regimen on 1/26  - per HF recs, will increase toprol XL to 100mg in AM and 50mg in PM

## 2021-01-27 NOTE — PROGRESS NOTE ADULT - ATTENDING COMMENTS
75F w/ PMHx of severe ICM (EF 20%), severe MR s/p mitraclip, HTN, HLD, DM2, CKD b/l CR~2 p/w sob due to acute on chronic chf.    Transitioned to PO diuretics and remains on RA. She continues to complain of severe neck pain and LE pain. Seems MSK in nature, will trial standing tylenol, lidocaine patch and obtain CT spine to r/o cervical lesion.    #acute on chronic HFrEF  -ECG NSR, pvcs, RA deviation, diffuse TWIs and ST deppressions in precordial leads  -tolerating BB  -bumex 2mg daily  -tele  -cards consult appreciated  -trend LFTs daily  -strict is/os and daily standing weights    #DM2  -titrate insulin prn - lantus bid  -endo following    #rash  -diffuse erythematous, raised pruritic papules  ?severe eczema  -start steroid cream, hydroxycine prn, permetherin per derm  -improving    #neck pain  -CT c spine pending  -standing tylenol  -lidocaine patch  -flexeril prn low dose    #decreased LLE pulses - vasc doppler negative    improving, likely approaching euvolemia - weight stable for last 3 days    Tor Newberry MD  Division of Hospital Medicine  Pager: [phone]  Office: [phone]

## 2021-01-27 NOTE — PROGRESS NOTE ADULT - PROBLEM SELECTOR PROBLEM 5
Type 2 diabetes mellitus with diabetic nephropathy, with long-term current use of insulin H/O pulmonary hypertension

## 2021-01-27 NOTE — PROGRESS NOTE ADULT - ASSESSMENT
75y.o F Vietnamese speaking h/o HTN, HLD, DM, CAD s/p CABG 2014 and prior PCI, severe mitral regurgitation (s/p mitral clip 2019), pulmonary HTN (TTE 2019), HF (EF 21 % June 2019), CKD IV not on dialysis (b/l SCr 2-2.2), hypothyroidism admitted for hypoxic respiratory failure 2/2 acute on chronic HFrEF exacerbation in the setting of mitral stenosis c/b MERVIN on CKD.  Pt initially treated w/ IV diuresis but was started on milrinone drip after RHC showed persistent elevated filled pressures. Now s/p  IV diuresis, milrinone dosage decreased but is w/ continued dyspnea despite clinical euvolemic status.  PRN xanax started for possible anxiety component.  Labs w/ intermittent elevations in WBC in the setting of asymptomatic bacteruria - continuing to monitor off abx.

## 2021-01-28 LAB
ANION GAP SERPL CALC-SCNC: 20 MMOL/L — HIGH (ref 5–17)
BUN SERPL-MCNC: 108 MG/DL — HIGH (ref 7–23)
CALCIUM SERPL-MCNC: 10.1 MG/DL — SIGNIFICANT CHANGE UP (ref 8.4–10.5)
CHLORIDE SERPL-SCNC: 101 MMOL/L — SIGNIFICANT CHANGE UP (ref 96–108)
CO2 SERPL-SCNC: 20 MMOL/L — LOW (ref 22–31)
CREAT SERPL-MCNC: 2.13 MG/DL — HIGH (ref 0.5–1.3)
GLUCOSE BLDC GLUCOMTR-MCNC: 111 MG/DL — HIGH (ref 70–99)
GLUCOSE BLDC GLUCOMTR-MCNC: 140 MG/DL — HIGH (ref 70–99)
GLUCOSE BLDC GLUCOMTR-MCNC: 145 MG/DL — HIGH (ref 70–99)
GLUCOSE BLDC GLUCOMTR-MCNC: 307 MG/DL — HIGH (ref 70–99)
GLUCOSE BLDC GLUCOMTR-MCNC: 70 MG/DL — SIGNIFICANT CHANGE UP (ref 70–99)
GLUCOSE SERPL-MCNC: 129 MG/DL — HIGH (ref 70–99)
HCT VFR BLD CALC: 35.6 % — SIGNIFICANT CHANGE UP (ref 34.5–45)
HGB BLD-MCNC: 10.7 G/DL — LOW (ref 11.5–15.5)
MAGNESIUM SERPL-MCNC: 3 MG/DL — HIGH (ref 1.6–2.6)
MCHC RBC-ENTMCNC: 25.8 PG — LOW (ref 27–34)
MCHC RBC-ENTMCNC: 30.1 GM/DL — LOW (ref 32–36)
MCV RBC AUTO: 85.8 FL — SIGNIFICANT CHANGE UP (ref 80–100)
NRBC # BLD: 0 /100 WBCS — SIGNIFICANT CHANGE UP (ref 0–0)
NT-PROBNP SERPL-SCNC: 2841 PG/ML — HIGH (ref 0–300)
PHOSPHATE SERPL-MCNC: 5.1 MG/DL — HIGH (ref 2.5–4.5)
PLATELET # BLD AUTO: 516 K/UL — HIGH (ref 150–400)
POTASSIUM SERPL-MCNC: 3.9 MMOL/L — SIGNIFICANT CHANGE UP (ref 3.5–5.3)
POTASSIUM SERPL-SCNC: 3.9 MMOL/L — SIGNIFICANT CHANGE UP (ref 3.5–5.3)
RBC # BLD: 4.15 M/UL — SIGNIFICANT CHANGE UP (ref 3.8–5.2)
RBC # FLD: 16.4 % — HIGH (ref 10.3–14.5)
SARS-COV-2 RNA SPEC QL NAA+PROBE: SIGNIFICANT CHANGE UP
SODIUM SERPL-SCNC: 141 MMOL/L — SIGNIFICANT CHANGE UP (ref 135–145)
WBC # BLD: 10.99 K/UL — HIGH (ref 3.8–10.5)
WBC # FLD AUTO: 10.99 K/UL — HIGH (ref 3.8–10.5)

## 2021-01-28 PROCEDURE — 99232 SBSQ HOSP IP/OBS MODERATE 35: CPT | Mod: GC

## 2021-01-28 PROCEDURE — 99233 SBSQ HOSP IP/OBS HIGH 50: CPT

## 2021-01-28 RX ORDER — INSULIN GLARGINE 100 [IU]/ML
25 INJECTION, SOLUTION SUBCUTANEOUS EVERY MORNING
Refills: 0 | Status: DISCONTINUED | OUTPATIENT
Start: 2021-01-28 | End: 2021-02-02

## 2021-01-28 RX ORDER — BUMETANIDE 0.25 MG/ML
1 INJECTION INTRAMUSCULAR; INTRAVENOUS ONCE
Refills: 0 | Status: COMPLETED | OUTPATIENT
Start: 2021-01-28 | End: 2021-01-28

## 2021-01-28 RX ADMIN — Medication 650 MILLIGRAM(S): at 12:06

## 2021-01-28 RX ADMIN — CLOPIDOGREL BISULFATE 75 MILLIGRAM(S): 75 TABLET, FILM COATED ORAL at 12:06

## 2021-01-28 RX ADMIN — Medication 25 MILLIGRAM(S): at 05:04

## 2021-01-28 RX ADMIN — HEPARIN SODIUM 5000 UNIT(S): 5000 INJECTION INTRAVENOUS; SUBCUTANEOUS at 12:06

## 2021-01-28 RX ADMIN — HEPARIN SODIUM 5000 UNIT(S): 5000 INJECTION INTRAVENOUS; SUBCUTANEOUS at 20:38

## 2021-01-28 RX ADMIN — INSULIN GLARGINE 28 UNIT(S): 100 INJECTION, SOLUTION SUBCUTANEOUS at 07:45

## 2021-01-28 RX ADMIN — LIDOCAINE 1 PATCH: 4 CREAM TOPICAL at 04:51

## 2021-01-28 RX ADMIN — CALAMINE AND ZINC OXIDE AND PHENOL 1 APPLICATION(S): 160; 10 LOTION TOPICAL at 05:05

## 2021-01-28 RX ADMIN — Medication 0.25 MILLIGRAM(S): at 12:06

## 2021-01-28 RX ADMIN — BUMETANIDE 2 MILLIGRAM(S): 0.25 INJECTION INTRAMUSCULAR; INTRAVENOUS at 05:04

## 2021-01-28 RX ADMIN — Medication 650 MILLIGRAM(S): at 05:06

## 2021-01-28 RX ADMIN — Medication 650 MILLIGRAM(S): at 20:38

## 2021-01-28 RX ADMIN — Medication 3 MILLIGRAM(S): at 20:38

## 2021-01-28 RX ADMIN — CALAMINE AND ZINC OXIDE AND PHENOL 1 APPLICATION(S): 160; 10 LOTION TOPICAL at 12:06

## 2021-01-28 RX ADMIN — SENNA PLUS 2 TABLET(S): 8.6 TABLET ORAL at 20:37

## 2021-01-28 RX ADMIN — Medication 81 MILLIGRAM(S): at 12:06

## 2021-01-28 RX ADMIN — INSULIN GLARGINE 50 UNIT(S): 100 INJECTION, SOLUTION SUBCUTANEOUS at 21:29

## 2021-01-28 RX ADMIN — Medication 20 UNIT(S): at 12:03

## 2021-01-28 RX ADMIN — HEPARIN SODIUM 5000 UNIT(S): 5000 INJECTION INTRAVENOUS; SUBCUTANEOUS at 05:06

## 2021-01-28 RX ADMIN — Medication 4: at 21:24

## 2021-01-28 RX ADMIN — Medication 25 MILLIGRAM(S): at 20:37

## 2021-01-28 RX ADMIN — CALAMINE AND ZINC OXIDE AND PHENOL 1 APPLICATION(S): 160; 10 LOTION TOPICAL at 20:38

## 2021-01-28 RX ADMIN — BUMETANIDE 1 MILLIGRAM(S): 0.25 INJECTION INTRAMUSCULAR; INTRAVENOUS at 18:07

## 2021-01-28 RX ADMIN — Medication 25 MILLIGRAM(S): at 12:06

## 2021-01-28 RX ADMIN — Medication 1 APPLICATION(S): at 05:05

## 2021-01-28 RX ADMIN — Medication 650 MILLIGRAM(S): at 18:07

## 2021-01-28 RX ADMIN — ATORVASTATIN CALCIUM 80 MILLIGRAM(S): 80 TABLET, FILM COATED ORAL at 20:37

## 2021-01-28 RX ADMIN — Medication 50 MILLIGRAM(S): at 18:08

## 2021-01-28 RX ADMIN — Medication 50 MICROGRAM(S): at 05:04

## 2021-01-28 RX ADMIN — Medication 20 UNIT(S): at 07:46

## 2021-01-28 RX ADMIN — Medication 100 MILLIGRAM(S): at 05:04

## 2021-01-28 NOTE — PROGRESS NOTE ADULT - SUBJECTIVE AND OBJECTIVE BOX
S: Resting comfortably on room air. Reports less neck pain. Denies chest pain or SOB. Review of systems otherwise (-)    Review of Systems:   Constitutional: [ ] fevers, [ ] chills.   Skin: [ ] dry skin. [ ] rashes.  Psychiatric: [ ] depression, [ ] anxiety.   Gastrointestinal: [ ] BRBPR, [ ] melena.   Neurological: [ ] confusion. [ ] seizures. [ ] shuffling gait.   Ears,Nose,Mouth and Throat: [ ] ear pain [ ] sore throat.   Eyes: [ ] diplopia.   Respiratory: [ ] hemoptysis. [ ] shortness of breath  Cardiovascular: See HPI above  Hematologic/Lymphatic: [ ] anemia. [ ] painful nodes. [ ] prolonged bleeding.   Genitourinary: [ ] hematuria. [ ] flank pain.   Endocrine: [ ] significant change in weight. [ ] intolerance to heat and cold.     Review of systems [ x] otherwise negative, [ ] otherwise unable to obtain    FH: no family history of sudden cardiac death in first degree relatives    SH: [ ] tobacco, [ ] alcohol, [ ] drugs       MEDICATIONS  (STANDING):  acetaminophen   Tablet .. 650 milliGRAM(s) Oral every 6 hours  aspirin enteric coated 81 milliGRAM(s) Oral daily  atorvastatin 80 milliGRAM(s) Oral at bedtime  buMETAnide 2 milliGRAM(s) Oral daily  calamine/zinc oxide Lotion 1 Application(s) Topical three times a day  clobetasol 0.05% Cream 1 Application(s) Topical every 12 hours  clopidogrel Tablet 75 milliGRAM(s) Oral daily  dextrose 40% Gel 15 Gram(s) Oral once  dextrose 5%. 1000 milliLiter(s) (50 mL/Hr) IV Continuous <Continuous>  dextrose 5%. 1000 milliLiter(s) (100 mL/Hr) IV Continuous <Continuous>  dextrose 50% Injectable 25 Gram(s) IV Push once  dextrose 50% Injectable 12.5 Gram(s) IV Push once  dextrose 50% Injectable 25 Gram(s) IV Push once  glucagon  Injectable 1 milliGRAM(s) IntraMuscular once  heparin   Injectable 5000 Unit(s) SubCutaneous every 8 hours  hydrALAZINE 25 milliGRAM(s) Oral every 8 hours  insulin glargine Injectable (LANTUS) 50 Unit(s) SubCutaneous at bedtime  insulin glargine Injectable (LANTUS) 28 Unit(s) SubCutaneous every morning  insulin lispro (ADMELOG) corrective regimen sliding scale   SubCutaneous three times a day before meals  insulin lispro (ADMELOG) corrective regimen sliding scale   SubCutaneous at bedtime  insulin lispro Injectable (ADMELOG) 20 Unit(s) SubCutaneous three times a day before meals  levothyroxine 50 MICROGram(s) Oral daily  melatonin 3 milliGRAM(s) Oral at bedtime  metoprolol succinate  milliGRAM(s) Oral <User Schedule>  metoprolol succinate ER 50 milliGRAM(s) Oral <User Schedule>  senna 2 Tablet(s) Oral at bedtime    MEDICATIONS  (PRN):  ALPRAZolam 0.25 milliGRAM(s) Oral every 8 hours PRN anxiety  baclofen 2.5 milliGRAM(s) Oral once PRN back and neck pain  polyethylene glycol 3350 17 Gram(s) Oral two times a day PRN Constipation      LABS:                          10.7   10.99 )-----------( 516      ( 28 Jan 2021 06:30 )             35.6     Hemoglobin: 10.7 g/dL (01-28 @ 06:30)  Hemoglobin: 10.1 g/dL (01-25 @ 05:56)  Hemoglobin: 10.4 g/dL (01-24 @ 05:58)    01-28    141  |  101  |  108<H>  ----------------------------<  129<H>  3.9   |  20<L>  |  2.13<H>    Ca    10.1      28 Jan 2021 07:47  Phos  5.1     01-28  Mg     3.0     01-28      Creatinine Trend: 2.13<--, 2.14<--, 2.19<--, 2.41<--, 2.32<--, 2.08<--             01-27-21 @ 07:01  -  01-28-21 @ 07:00  --------------------------------------------------------  IN: 1200 mL / OUT: 870 mL / NET: 330 mL    01-28-21 @ 07:01  -  01-28-21 @ 14:34  --------------------------------------------------------  IN: 480 mL / OUT: 0 mL / NET: 480 mL        PHYSICAL EXAM  Vital Signs Last 24 Hrs  T(C): 36.7 (28 Jan 2021 11:15), Max: 37.2 (28 Jan 2021 00:40)  T(F): 98.1 (28 Jan 2021 11:15), Max: 99 (28 Jan 2021 00:40)  HR: 80 (28 Jan 2021 11:15) (74 - 86)  BP: 104/62 (28 Jan 2021 11:15) (98/62 - 108/62)  BP(mean): --  RR: 18 (28 Jan 2021 11:15) (18 - 18)  SpO2: 97% (28 Jan 2021 11:15) (95% - 98%)        General: Well nourished in no acute distress. Alert and Oriented * 3.   Head: Normocephalic and atraumatic.   Neck: No JVD. No bruits. Supple. Does not appear to be enlarged.   Cardiovascular: + S1,S2 ; RRR Soft systolic murmur at the left lower sternal border. No rubs noted.    Lungs: CTA b/l. No rhonchi, rales or wheezes.   Abdomen: + BS, soft. Non tender. Non distended. No rebound. No guarding.   Extremities: No clubbing/cyanosis/edema.   Neurologic: Moves all four extremities. Full range of motion.   Skin: Warm and moist. The patient's skin has normal elasticity and good skin turgor.   Psychiatric: Appropriate mood and affect.  Musculoskeletal: Normal range of motion, normal strength    TELEMETRY: SR 70-80s    < from: Cardiac Cath Lab - Adult (01.20.21 @ 10:22) >  PROCEDURE:  --  Right heart catheterization.  --  Sonosite - Diagnostic.  COMPLICATIONS: There were no complications.  DIAGNOSTIC RECOMMENDATIONS: The patient should continuewith the present  medications.  Prepared and signed by  Cesar Jackson M.D.       A/P) 76 y/o female PMH HTN, HLD, DM, CAD s/p CABG 2014 and prior PCI, severe mitral regurgitation (s/p mitral clip 2019), pulmonary HTN, HF (EF 21 % June 2019), CKD IV not on dialysis (b/l SCr 2-2.2), hypothyroidism a/w acute on chronic systolic CHF with NYHA IV functional status.    -CT C spine noted with no fracture, degenerative changes  -repeat echo noted but didn't reassess degree of mitral stenosis  -Continue dapt/statin   -RHC noted above - Filling pressures are appropriate considering LV function and the mitral valve gradient  -Volume status improved - continue PO Bumex  -Continue Toprol XL  -Tolerating hydralazine  -very poor candidate for a primary prevention ICD  -no further inpatient cardiac w/u planned  -D/C planning per primary team  -f/u with Dr. Sotelo on Friday 1/29 at 12 PM     Amauri Hill PA-C  Pager: [phone]

## 2021-01-28 NOTE — PROGRESS NOTE ADULT - ASSESSMENT
74 yo F w/ PMH of CKD stage 4 (baseline Cr 1.9-2.2) HTN, T2DM, CAD s/p CABG X3 (2014 at [facility]), non-dilated ICM (EF 20-25%), severe mitral regurgitation s/p mitral clip (6/13/19), severe pulm HTN, hypothyroidism who presented for evaluation of SOB and was admitted for ADHF.    CKD stage 4  - baseline Cr 1.9-2.2; has had recurrent MERVIN's over the years  - CKD is likely 2/2 recurrent MERVIN's + underlying DM/HTN  - Scr stable today. BUN elevated due to diuretics. Pt switch to oral diuretics today Diuretics per heart failure team   - Avoid hypotension   - renal sonogram in the past with simple renal cyst  - d/w daughter and son-in-law on 01/23 about worsening renal function and if no improvement likely need for dialysis. They were also informed that she will be a poor candidate for HD in view of CHF. They understand but wants HD if needed. Consent for HD is in chart.  - She is not uremic, monitor at present  - Avoid nephrotoxins including NSAIDs, IV contrast (if possible), [employer] products  - monitor I/O's accurately    HTN  BP controlled  Low salt diet   MOnitor BP    Proteinuria/Hematuria  - likely from underlying DM  - has 1.8 grams proteinuria  - Hep B, Hep C, HIV RF, C3, C4, p-ANCA, c-ANCA, RPR neg  - weak gamma-migrating protein, weak IgG lambda protein band noted in the past. Pt to follow w/ heme   - DEAN 1:320 positive  - RPR neg, FTA +

## 2021-01-28 NOTE — PROGRESS NOTE ADULT - SUBJECTIVE AND OBJECTIVE BOX
Jacquie Amos MD  Internal Medicine, PGY-3  [phone]    Patient is a 75y old  Female who presents with a chief complaint of increased work of breathing (27 Jan 2021 13:48)      SUBJECTIVE / OVERNIGHT EVENTS:       REVIEW OF SYSTEMS:    CONSTITUTIONAL: No weakness, fevers or chills  EYES: No visual changes; No blurry vision  ENT:  No pain or stiffness; No vertigo or throat pain  RESPIRATORY: No cough, wheezing, hemoptysis; No shortness of breath  CARDIOVASCULAR: No chest pain or palpitations  GASTROINTESTINAL: No abdominal or epigastric pain. No nausea, vomiting, or hematemesis; No diarrhea or constipation. No melena or hematochezia.  GENITOURINARY: No dysuria, frequency or hematuria  NEUROLOGICAL: No numbness, No HA  SKIN: No itching, rashes  MSK: no joint pain, no muscle pain    MEDICATIONS  (STANDING):  acetaminophen   Tablet .. 650 milliGRAM(s) Oral every 6 hours  aspirin enteric coated 81 milliGRAM(s) Oral daily  atorvastatin 80 milliGRAM(s) Oral at bedtime  buMETAnide 2 milliGRAM(s) Oral daily  calamine/zinc oxide Lotion 1 Application(s) Topical three times a day  clobetasol 0.05% Cream 1 Application(s) Topical every 12 hours  clopidogrel Tablet 75 milliGRAM(s) Oral daily  dextrose 40% Gel 15 Gram(s) Oral once  dextrose 5%. 1000 milliLiter(s) (50 mL/Hr) IV Continuous <Continuous>  dextrose 5%. 1000 milliLiter(s) (100 mL/Hr) IV Continuous <Continuous>  dextrose 50% Injectable 25 Gram(s) IV Push once  dextrose 50% Injectable 12.5 Gram(s) IV Push once  dextrose 50% Injectable 25 Gram(s) IV Push once  glucagon  Injectable 1 milliGRAM(s) IntraMuscular once  heparin   Injectable 5000 Unit(s) SubCutaneous every 8 hours  hydrALAZINE 25 milliGRAM(s) Oral every 8 hours  insulin glargine Injectable (LANTUS) 28 Unit(s) SubCutaneous every morning  insulin glargine Injectable (LANTUS) 50 Unit(s) SubCutaneous at bedtime  insulin lispro (ADMELOG) corrective regimen sliding scale   SubCutaneous three times a day before meals  insulin lispro (ADMELOG) corrective regimen sliding scale   SubCutaneous at bedtime  insulin lispro Injectable (ADMELOG) 20 Unit(s) SubCutaneous three times a day before meals  levothyroxine 50 MICROGram(s) Oral daily  melatonin 3 milliGRAM(s) Oral at bedtime  metoprolol succinate  milliGRAM(s) Oral <User Schedule>  metoprolol succinate ER 50 milliGRAM(s) Oral <User Schedule>  senna 2 Tablet(s) Oral at bedtime    MEDICATIONS  (PRN):  ALPRAZolam 0.25 milliGRAM(s) Oral every 8 hours PRN anxiety  baclofen 2.5 milliGRAM(s) Oral once PRN back and neck pain  polyethylene glycol 3350 17 Gram(s) Oral two times a day PRN Constipation      CAPILLARY BLOOD GLUCOSE      POCT Blood Glucose.: 140 mg/dL (28 Jan 2021 07:19)  POCT Blood Glucose.: 277 mg/dL (27 Jan 2021 21:07)  POCT Blood Glucose.: 151 mg/dL (27 Jan 2021 16:24)  POCT Blood Glucose.: 288 mg/dL (27 Jan 2021 11:39)  POCT Blood Glucose.: 86 mg/dL (27 Jan 2021 07:25)    I&O's Summary    27 Jan 2021 07:01  -  28 Jan 2021 07:00  --------------------------------------------------------  IN: 1200 mL / OUT: 870 mL / NET: 330 mL        PHYSICAL EXAM:  Vital Signs Last 24 Hrs  T(C): 36.9 (28 Jan 2021 04:43), Max: 37.2 (28 Jan 2021 00:40)  T(F): 98.4 (28 Jan 2021 04:43), Max: 99 (28 Jan 2021 00:40)  HR: 82 (28 Jan 2021 04:43) (74 - 90)  BP: 100/56 (28 Jan 2021 04:43) (98/62 - 117/69)  BP(mean): --  RR: 18 (28 Jan 2021 04:43) (18 - 18)  SpO2: 95% (28 Jan 2021 04:43) (95% - 98%)    PHYSICAL EXAM:  GENERAL: NAD, well-groomed, well-developed  HEAD:  Atraumatic, Normocephalic  EYES: EOMI, PERRLA, conjunctiva and sclera clear  ENMT: No tonsillar erythema, exudates, or enlargement; Moist mucous membranes, Good dentition, No lesions  NECK: Supple, No JVD, Normal thyroid  CHEST/LUNG: Clear to ausculation bilaterally; No rales, rhonchi, wheezing, or rubs  HEART: Regular rate and rhythm; No murmurs, rubs, or gallops  ABDOMEN: Soft, Nontender, Nondistended; Bowel sounds present  EXTREMITIES:  2+ Peripheral Pulses, No clubbing, cyanosis, or edema  LYMPH: No lymphadenopathy noted  SKIN: No rashes or lesions  NERVOUS SYSTEM:  Alert & Oriented X3, Good concentration; Motor Strength 5/5 B/L upper and lower extremities; DTRs 2+ intact and symmetric      LABS:                        10.7   10.99 )-----------( 516      ( 28 Jan 2021 06:30 )             35.6     01-27    139  |  101  |  104<H>  ----------------------------<  67<L>  3.9   |  22  |  2.14<H>    Ca    9.9      27 Jan 2021 06:31  Phos  5.0     01-27  Mg     2.8     01-27                              RADIOLOGY & ADDITIONAL TESTS:  Results Reviewed:   Imaging Personally Reviewed:  Electrocardiogram Personally Reviewed:    COORDINATION OF CARE:  Care Discussed with Consultants/Other Providers [Y/N]:  Prior or Outpatient Records Reviewed [Y/N]:   Jacquie Amos MD  Internal Medicine, PGY-3  [phone]    Patient is a 75y old  Female who presents with a chief complaint of increased work of breathing (27 Jan 2021 13:48)      SUBJECTIVE / OVERNIGHT EVENTS: No acute events overnight.  pt reports feeling tired but says she has been sleeping.  Denies SOB today and is breathing more comfortably.  Says she wants to go home.       ADDITIONAL ROS: no abdominal  pain, n/v/d/c or dysuria.     MEDICATIONS  (STANDING):  acetaminophen   Tablet .. 650 milliGRAM(s) Oral every 6 hours  aspirin enteric coated 81 milliGRAM(s) Oral daily  atorvastatin 80 milliGRAM(s) Oral at bedtime  buMETAnide 2 milliGRAM(s) Oral daily  calamine/zinc oxide Lotion 1 Application(s) Topical three times a day  clobetasol 0.05% Cream 1 Application(s) Topical every 12 hours  clopidogrel Tablet 75 milliGRAM(s) Oral daily  dextrose 40% Gel 15 Gram(s) Oral once  dextrose 5%. 1000 milliLiter(s) (50 mL/Hr) IV Continuous <Continuous>  dextrose 5%. 1000 milliLiter(s) (100 mL/Hr) IV Continuous <Continuous>  dextrose 50% Injectable 25 Gram(s) IV Push once  dextrose 50% Injectable 12.5 Gram(s) IV Push once  dextrose 50% Injectable 25 Gram(s) IV Push once  glucagon  Injectable 1 milliGRAM(s) IntraMuscular once  heparin   Injectable 5000 Unit(s) SubCutaneous every 8 hours  hydrALAZINE 25 milliGRAM(s) Oral every 8 hours  insulin glargine Injectable (LANTUS) 28 Unit(s) SubCutaneous every morning  insulin glargine Injectable (LANTUS) 50 Unit(s) SubCutaneous at bedtime  insulin lispro (ADMELOG) corrective regimen sliding scale   SubCutaneous three times a day before meals  insulin lispro (ADMELOG) corrective regimen sliding scale   SubCutaneous at bedtime  insulin lispro Injectable (ADMELOG) 20 Unit(s) SubCutaneous three times a day before meals  levothyroxine 50 MICROGram(s) Oral daily  melatonin 3 milliGRAM(s) Oral at bedtime  metoprolol succinate  milliGRAM(s) Oral <User Schedule>  metoprolol succinate ER 50 milliGRAM(s) Oral <User Schedule>  senna 2 Tablet(s) Oral at bedtime    MEDICATIONS  (PRN):  ALPRAZolam 0.25 milliGRAM(s) Oral every 8 hours PRN anxiety  baclofen 2.5 milliGRAM(s) Oral once PRN back and neck pain  polyethylene glycol 3350 17 Gram(s) Oral two times a day PRN Constipation      CAPILLARY BLOOD GLUCOSE      POCT Blood Glucose.: 140 mg/dL (28 Jan 2021 07:19)  POCT Blood Glucose.: 277 mg/dL (27 Jan 2021 21:07)  POCT Blood Glucose.: 151 mg/dL (27 Jan 2021 16:24)  POCT Blood Glucose.: 288 mg/dL (27 Jan 2021 11:39)  POCT Blood Glucose.: 86 mg/dL (27 Jan 2021 07:25)    I&O's Summary    27 Jan 2021 07:01  -  28 Jan 2021 07:00  --------------------------------------------------------  IN: 1200 mL / OUT: 870 mL / NET: 330 mL        PHYSICAL EXAM:  Vital Signs Last 24 Hrs  T(C): 36.9 (28 Jan 2021 04:43), Max: 37.2 (28 Jan 2021 00:40)  T(F): 98.4 (28 Jan 2021 04:43), Max: 99 (28 Jan 2021 00:40)  HR: 82 (28 Jan 2021 04:43) (74 - 90)  BP: 100/56 (28 Jan 2021 04:43) (98/62 - 117/69)  BP(mean): --  RR: 18 (28 Jan 2021 04:43) (18 - 18)  SpO2: 95% (28 Jan 2021 04:43) (95% - 98%)    PHYSICAL EXAM:  GENERAL: NAD, cachectic, deconditioned  HEAD:  Atraumatic, Normocephalic  EYES: EOMI, PERRLA, conjunctiva and sclera clear  CHEST/LUNG: no wheeze, trace bibasilar crackles   HEART: Regular rate and rhythm; No murmurs, rubs, or gallops  ABDOMEN: Soft, Nontender, Nondistended; Bowel sounds present  EXTREMITIES:  2+ Peripheral Pulses, No clubbing, cyanosis, or edema  NERVOUS SYSTEM:  Alert & Oriented X3, Good concentration; no focal deficits       LABS:                        10.7   10.99 )-----------( 516      ( 28 Jan 2021 06:30 )             35.6     01-27    139  |  101  |  104<H>  ----------------------------<  67<L>  3.9   |  22  |  2.14<H>    Ca    9.9      27 Jan 2021 06:31  Phos  5.0     01-27  Mg     2.8     01-27        < from: CT Cervical Spine No Cont (01.27.21 @ 15:32) >  PROCEDURE DATE:  01/27/2021            INTERPRETATION:  INDICATIONS:  Neck pain with palpation, limited range of motion    TECHNIQUE:  Axial images were obtained using multislice helical technique.  Reformatted coronal and sagittal images were performed.    COMPARISON EXAMINATION:  None.    FINDINGS:  VERTEBRAL BODIES:  No fracture. Osteophytosis at C5-C6. Bifid spinous processes.  ALIGNMENT:  Minimal C4-C5 anterolisthesis. Straightening of the cervical lordosis.  INTERVERTEBRAL DISC SPACES: There is loss of disc height at C5-C6 with a ventral epidural ridge. Patent neural foramina.  MISCELLANEOUS:  None.    IMPRESSION:  No fractures.    Mild C5-C6 degenerative changes.    < end of copied text >

## 2021-01-28 NOTE — PROGRESS NOTE ADULT - SUBJECTIVE AND OBJECTIVE BOX
Subjective:  - CT cervical spine showing no fracture, degenerative changes  - Resting comfortably in bed this morning    Medications:  acetaminophen   Tablet .. 650 milliGRAM(s) Oral every 6 hours  ALPRAZolam 0.25 milliGRAM(s) Oral every 8 hours PRN  aspirin enteric coated 81 milliGRAM(s) Oral daily  atorvastatin 80 milliGRAM(s) Oral at bedtime  baclofen 2.5 milliGRAM(s) Oral once PRN  buMETAnide 2 milliGRAM(s) Oral daily  calamine/zinc oxide Lotion 1 Application(s) Topical three times a day  clobetasol 0.05% Cream 1 Application(s) Topical every 12 hours  clopidogrel Tablet 75 milliGRAM(s) Oral daily  dextrose 40% Gel 15 Gram(s) Oral once  dextrose 5%. 1000 milliLiter(s) IV Continuous <Continuous>  dextrose 5%. 1000 milliLiter(s) IV Continuous <Continuous>  dextrose 50% Injectable 25 Gram(s) IV Push once  dextrose 50% Injectable 12.5 Gram(s) IV Push once  dextrose 50% Injectable 25 Gram(s) IV Push once  glucagon  Injectable 1 milliGRAM(s) IntraMuscular once  heparin   Injectable 5000 Unit(s) SubCutaneous every 8 hours  hydrALAZINE 25 milliGRAM(s) Oral every 8 hours  insulin glargine Injectable (LANTUS) 50 Unit(s) SubCutaneous at bedtime  insulin glargine Injectable (LANTUS) 28 Unit(s) SubCutaneous every morning  insulin lispro (ADMELOG) corrective regimen sliding scale   SubCutaneous three times a day before meals  insulin lispro (ADMELOG) corrective regimen sliding scale   SubCutaneous at bedtime  insulin lispro Injectable (ADMELOG) 20 Unit(s) SubCutaneous three times a day before meals  levothyroxine 50 MICROGram(s) Oral daily  melatonin 3 milliGRAM(s) Oral at bedtime  metoprolol succinate  milliGRAM(s) Oral <User Schedule>  metoprolol succinate ER 50 milliGRAM(s) Oral <User Schedule>  polyethylene glycol 3350 17 Gram(s) Oral two times a day PRN  senna 2 Tablet(s) Oral at bedtime      Physical Exam:    Vitals:  Vital Signs Last 24 Hours  T(C): 36.7 (21 @ 11:15), Max: 37.2 (21 @ 00:40)  HR: 80 (21 @ 11:15) (74 - 86)  BP: 104/62 (21 @ 11:15) (98/62 - 108/62)  RR: 18 (21 @ 11:15) (18 - 18)  SpO2: 97% (21 @ 11:15) (95% - 98%)    Weight in k ( @ 06:58)    I&O's Summary    2021 07:  -  2021 07:00  --------------------------------------------------------  IN: 1200 mL / OUT: 870 mL / NET: 330 mL    Tele: SR 1st deg AVB, PVCs    General: Frail. resting comfortably in bed  HEENT: EOM intact.  Neck: JVP difficult to assess, appears moderately elevated  Chest: Unlabored  CV: Regular, Normal S1 and S2. II/VI SM. Radial pulses normal. No LE edema.  Abdomen: Soft, non-distended, non-tender  Skin: No rashes or skin breakdown. Warm peripherally  Neurology: Alert and oriented times three. Sensation intact  Psych: Depressed mood    Labs:                        10.7   10.99 )-----------( 516      ( 2021 06:30 )             35.6         141  |  101  |  108<H>  ----------------------------<  129<H>  3.9   |  20<L>  |  2.13<H>    Ca    10.1      2021 07:47  Phos  5.1       Mg     3.0         Serum Pro-Brain Natriuretic Peptide: 2841 pg/mL ( @ 07:47)

## 2021-01-28 NOTE — PROGRESS NOTE ADULT - PROBLEM SELECTOR PLAN 2
-TTE 1/13 showed EF of 20% with global LV and RV dysfunction w/ MS.   Repeat TTE 1/19 showed severely decreased LV fx and grossly decreased RV fx w/ EF 25-30%  - Heart failure and cardiology following  - see above for history.    - c/w decreased dosage of milrinone  - s/p bumex drip.  Started back on home regimen on 1/26  - per HF recs, will increase toprol XL to 100mg in AM and 50mg in PM -TTE 1/13 showed EF of 20% with global LV and RV dysfunction w/ MS.   Repeat TTE 1/19 showed severely decreased LV fx and grossly decreased RV fx w/ EF 25-30%  - Heart failure and cardiology following  - see above for history.    - s/p milrinone drip  - s/p bumex drip.  Started back on home regimen on 1/26  - per HF recs, will increase toprol XL to 100mg in AM and 50mg in PM

## 2021-01-28 NOTE — PROGRESS NOTE ADULT - ASSESSMENT
Assessment  DMT2: 75y Female with DM T2 with hyperglycemia, A1C 7.1%, was on insulin at home, now on basal bolus insulin, adjusted dose yesterday, blood sugars improved and trending within acceptable range today, no new hypoglycemic episodes. Patient is eating partial meals, appears comfortable, DC planning in progress.  HF: on medications, stable, monitored, FU Cardiology.  Hypothyroidism: On Synthroid 50 mcg po daily, compliant with Synthroid intake, asymptomatic, euthyroid.  CKD: Monitor labs/BMP      Jillian Banks MD  Cell: [phone]  Office: [phone]       Assessment  DMT2: 75y Female with DM T2 with hyperglycemia, A1C 7.1%, was on insulin at home, now on basal bolus insulin, adjusted dose yesterday, blood sugars improved and trending within acceptable range today, no  new hypoglycemic episodes. Patient is eating partial meals, appears comfortable, DC planning in progress.  HF: on medications, stable, monitored, FU Cardiology.  Hypothyroidism: On Synthroid 50 mcg po daily, compliant with Synthroid intake, asymptomatic, euthyroid.  CKD: Monitor labs/BMP      Jillian Banks MD  Cell: [phone]  Office: [phone]

## 2021-01-28 NOTE — PROGRESS NOTE ADULT - PROBLEM SELECTOR PLAN 1
Patient is not a candidate for tight sugar control given her age and CKD. Will continue current insulin regimen for now. Will continue monitoring FS, log, and FU.  Suggest DC home on current basal bolus insulin regimen with endo FU 4 weeks.  Patient counseled for compliance with consistent low carb diet.

## 2021-01-28 NOTE — PROGRESS NOTE ADULT - SUBJECTIVE AND OBJECTIVE BOX
Chief complaint  Patient is a 75y old  Female who presents with a chief complaint of increased work of breathing (28 Jan 2021 12:35)   Review of systems  Patient in bed, looks comfortable, no hypoglycemic episodes.    Labs and Fingersticks  CAPILLARY BLOOD GLUCOSE      POCT Blood Glucose.: 145 mg/dL (28 Jan 2021 11:51)  POCT Blood Glucose.: 140 mg/dL (28 Jan 2021 07:19)  POCT Blood Glucose.: 277 mg/dL (27 Jan 2021 21:07)  POCT Blood Glucose.: 151 mg/dL (27 Jan 2021 16:24)    Medications  MEDICATIONS  (STANDING):  acetaminophen   Tablet .. 650 milliGRAM(s) Oral every 6 hours  aspirin enteric coated 81 milliGRAM(s) Oral daily  atorvastatin 80 milliGRAM(s) Oral at bedtime  buMETAnide 2 milliGRAM(s) Oral daily  calamine/zinc oxide Lotion 1 Application(s) Topical three times a day  clobetasol 0.05% Cream 1 Application(s) Topical every 12 hours  clopidogrel Tablet 75 milliGRAM(s) Oral daily  dextrose 40% Gel 15 Gram(s) Oral once  dextrose 5%. 1000 milliLiter(s) (50 mL/Hr) IV Continuous <Continuous>  dextrose 5%. 1000 milliLiter(s) (100 mL/Hr) IV Continuous <Continuous>  dextrose 50% Injectable 25 Gram(s) IV Push once  dextrose 50% Injectable 12.5 Gram(s) IV Push once  dextrose 50% Injectable 25 Gram(s) IV Push once  glucagon  Injectable 1 milliGRAM(s) IntraMuscular once  heparin   Injectable 5000 Unit(s) SubCutaneous every 8 hours  hydrALAZINE 25 milliGRAM(s) Oral every 8 hours  insulin glargine Injectable (LANTUS) 50 Unit(s) SubCutaneous at bedtime  insulin glargine Injectable (LANTUS) 28 Unit(s) SubCutaneous every morning  insulin lispro (ADMELOG) corrective regimen sliding scale   SubCutaneous three times a day before meals  insulin lispro (ADMELOG) corrective regimen sliding scale   SubCutaneous at bedtime  insulin lispro Injectable (ADMELOG) 20 Unit(s) SubCutaneous three times a day before meals  levothyroxine 50 MICROGram(s) Oral daily  melatonin 3 milliGRAM(s) Oral at bedtime  metoprolol succinate  milliGRAM(s) Oral <User Schedule>  metoprolol succinate ER 50 milliGRAM(s) Oral <User Schedule>  senna 2 Tablet(s) Oral at bedtime      Physical Exam  General: Patient comfortable in bed  Vital Signs Last 12 Hrs  T(F): 98.1 (01-28-21 @ 11:15), Max: 98.4 (01-28-21 @ 04:43)  HR: 80 (01-28-21 @ 11:15) (77 - 82)  BP: 104/62 (01-28-21 @ 11:15) (100/56 - 106/68)  BP(mean): --  RR: 18 (01-28-21 @ 11:15) (18 - 18)  SpO2: 97% (01-28-21 @ 11:15) (95% - 97%)  Neck: No palpable thyroid nodules.       Chief complaint  Patient is a 75y old  Female who presents with a chief complaint of increased work of breathing (28 Jan 2021 12:35)   Review of systems  Patient in bed, looks comfortable, no hypoglycemic episodes.    Labs and Fingersticks  CAPILLARY BLOOD GLUCOSE      POCT Blood Glucose.: 145 mg/dL (28 Jan 2021 11:51)  POCT Blood Glucose.: 140 mg/dL (28 Jan 2021 07:19)  POCT Blood Glucose.: 277 mg/dL (27 Jan 2021 21:07)  POCT Blood Glucose.: 151 mg/dL (27 Jan 2021 16:24)    Medications  MEDICATIONS  (STANDING):  acetaminophen   Tablet .. 650 milliGRAM(s) Oral every 6 hours  aspirin enteric coated 81 milliGRAM(s) Oral daily  atorvastatin 80 milliGRAM(s) Oral at bedtime  buMETAnide 2 milliGRAM(s) Oral daily  calamine/zinc oxide Lotion 1 Application(s) Topical three times a day  clobetasol 0.05% Cream 1 Application(s) Topical every 12 hours  clopidogrel Tablet 75 milliGRAM(s) Oral daily  dextrose 40% Gel 15 Gram(s) Oral once  dextrose 5%. 1000 milliLiter(s) (50 mL/Hr) IV Continuous <Continuous>  dextrose 5%. 1000 milliLiter(s) (100 mL/Hr) IV Continuous <Continuous>  dextrose 50% Injectable 25 Gram(s) IV Push once  dextrose 50% Injectable 12.5 Gram(s) IV Push once  dextrose 50% Injectable 25 Gram(s) IV Push once  glucagon  Injectable 1 milliGRAM(s) IntraMuscular once  heparin   Injectable 5000 Unit(s) SubCutaneous every 8 hours  hydrALAZINE 25 milliGRAM(s) Oral every 8 hours  insulin glargine Injectable (LANTUS) 50 Unit(s) SubCutaneous at bedtime  insulin glargine Injectable (LANTUS) 28 Unit(s) SubCutaneous every morning  insulin lispro (ADMELOG) corrective regimen sliding scale   SubCutaneous three times a day before meals  insulin lispro (ADMELOG) corrective regimen sliding scale   SubCutaneous at bedtime  insulin lispro Injectable (ADMELOG) 20 Unit(s) SubCutaneous three times a day before meals  levothyroxine 50 MICROGram(s) Oral daily  melatonin 3 milliGRAM(s) Oral at bedtime  metoprolol succinate  milliGRAM(s) Oral <User Schedule>  metoprolol succinate ER 50 milliGRAM(s) Oral <User Schedule>  senna 2 Tablet(s) Oral at bedtime      Physical Exam  General: Patient comfortable in bed  Vital Signs Last 12 Hrs  T(F): 98.1 (01-28-21 @ 11:15), Max: 98.4 (01-28-21 @ 04:43)  HR: 80 (01-28-21 @ 11:15) (77 - 82)  BP: 104/62 (01-28-21 @ 11:15) (100/56 - 106/68)  BP(mean): --  RR: 18 (01-28-21 @ 11:15) (18 - 18)  SpO2: 97% (01-28-21 @ 11:15) (95% - 97%)  Neck: No palpable thyroid nodules.

## 2021-01-28 NOTE — PROGRESS NOTE ADULT - ASSESSMENT
Briefly, 74 yo F with HTN, HLD, DM2 (A1c 7.5 10/19), CAD s/p CABG (LIMA to LAD, SVG to OM, SVG to PDA) and prior PCI, ICM HFrEF (EF 20-25%, LVEDD 4.7 cm), severe mitral regurgitation s/p mitral clip (6/19; mean MV gradient 7-8), severe pulmonary HTN, CKD IV (b/l Cr 1.9-2.2), hypothyroidism who BIBEMS 2/2 worsening work of breathing presents in acute on chronic systolic CHF with NYHA IV functional status. Was notably on midodrine as outpatient since [facility] 10/19. Has been having increased work of breathing at home with failure to thrive. Initially required Bipap and was given bumex with some improvement. Continues to be dyspneic. Etiology of her symptoms may be secondary to degree of mitral stenosis given HR in 80-90s with mean gradient of 8 on TTE. Also, per nursing staff some of her dyspnea may be anxiety related. She is s/p RHC 1/20 which showed mildly elevated filling pressures with preserved CO and was started on milrinone to assist with diuresis given her fluctuating renal function.     She currently appears mildly volume up on exam, positive ~300ml per I/Os, no standing weight today. Her proBNP is overall downtrending. Her BUN/Cr remain stable off milrinone and she is tolerating low dose vasodilators.    1/20/20 RHC: RA 15, RV 49/10, PA 50/21 32, PCWP 19, PA 55.4, Ao 98. [place] 3.5/2.5

## 2021-01-28 NOTE — PROGRESS NOTE ADULT - ASSESSMENT
75y.o F Kinyarwanda speaking h/o HTN, HLD, DM, CAD s/p CABG 2014 and prior PCI, severe mitral regurgitation (s/p mitral clip 2019), pulmonary HTN (TTE 2019), HF (EF 21 % June 2019), CKD IV not on dialysis (b/l SCr 2-2.2), hypothyroidism admitted for hypoxic respiratory failure 2/2 acute on chronic HFrEF exacerbation in the setting of mitral stenosis c/b MERVIN on CKD.  Pt initially treated w/ IV diuresis but was started on milrinone drip after RHC showed persistent elevated filled pressures. Now s/p  IV diuresis, milrinone dosage decreased but is w/ continued dyspnea despite clinical euvolemic status.  PRN xanax started for possible anxiety component.  Labs w/ intermittent elevations in WBC in the setting of asymptomatic bacteruria - continuing to monitor off abx.

## 2021-01-28 NOTE — PROGRESS NOTE ADULT - PROBLEM SELECTOR PLAN 6
-Takes 60 units of lantus at bedtime and 18 units of humalog   - increased Lantus and Admelog based on sliding scale.  Will continue to titrate to achieve BS goal 140-180  - Endo consulted.  Started BID Lantus dosing 1/21  - lantus reduced to 28U in AM and admelog reduced to 15u premeal 2/2 FS of 80s in AM.

## 2021-01-28 NOTE — PROGRESS NOTE ADULT - PROBLEM SELECTOR PLAN 1
- 2/2 CHF exacerbation.  EF 20% w/ mitral stenosis.  CXR on admission showed bilateral predominantly lower lobe hazy opacities which is likely 2/2 fluid.  Not presenting w/ clinical signs of PNA and CT chest showed no focal consolidations.  Monitored off abx.  Blood cx also negative  - will c/w Toprol  qd for MS hx; goal 60-70.  If HR still in the 80s on 1/26 will re-fractionate to tartrate and increased to 75 BID  - pt was being treated w/ IV diuresis which was stopped but restarted after RHC 1/20 showed elevated filling pressures. Milrinone drip and Bumex restarted (bladder scan showed evidence of retention (120cc) @ that time).    - Pt now s/p Bumex drip but is still on milrinone drip (dosage decreased 1/25)  - pt w/ persistent dyspnea however CT chest 1/25 shows clear lungs  - started PRN xanax for possible anxiety component - 2/2 CHF exacerbation.  EF 20% w/ mitral stenosis.  CXR on admission showed bilateral predominantly lower lobe hazy opacities which is likely 2/2 fluid.  Not presenting w/ clinical signs of PNA and CT chest showed no focal consolidations.  Monitored off abx.  Blood cx also negative  - pt was being treated w/ IV diuresis which was stopped but restarted after RHC 1/20 showed elevated filling pressures. Milrinone drip and Bumex restarted (bladder scan showed evidence of retention (120cc) @ that time).    - Pt now s/p Bumex drip and milrinone drip - ended 1/26  - pt w/ persistent dyspnea however CT chest 1/25 shows clear lungs  - started PRN xanax for possible anxiety component

## 2021-01-28 NOTE — PROGRESS NOTE ADULT - SUBJECTIVE AND OBJECTIVE BOX
[facility] [place]   MD Dion Dasilva MD, D.O. Ruoru Wong, PA    From 7 AM - 5 PM:  OFFICE: [phone]  Dr. Muhammad cell: [phone]  Dr. Monteor cell: [phone]  Dr. Soria cell: [phone]  RASHIDA Smith cell: [phone]    From 5 PM - 7 AM: Answering Service: [phone]  Date of service: 01-28-21 @ 10:51    RENAL FOLLOW UP NOTE  --------------------------------------------------------------------------------  HPI:  Pt seen and examined at bedside.     PAST HISTORY  --------------------------------------------------------------------------------  No significant changes to PMH, PSH, FHx, SHx, unless otherwise noted    ALLERGIES & MEDICATIONS  --------------------------------------------------------------------------------  Allergies    azithromycin (Hives; Pruritus)    Intolerances      Standing Inpatient Medications  acetaminophen   Tablet .. 650 milliGRAM(s) Oral every 6 hours  aspirin enteric coated 81 milliGRAM(s) Oral daily  atorvastatin 80 milliGRAM(s) Oral at bedtime  buMETAnide 2 milliGRAM(s) Oral daily  calamine/zinc oxide Lotion 1 Application(s) Topical three times a day  clobetasol 0.05% Cream 1 Application(s) Topical every 12 hours  clopidogrel Tablet 75 milliGRAM(s) Oral daily  dextrose 40% Gel 15 Gram(s) Oral once  dextrose 5%. 1000 milliLiter(s) IV Continuous <Continuous>  dextrose 5%. 1000 milliLiter(s) IV Continuous <Continuous>  dextrose 50% Injectable 25 Gram(s) IV Push once  dextrose 50% Injectable 12.5 Gram(s) IV Push once  dextrose 50% Injectable 25 Gram(s) IV Push once  glucagon  Injectable 1 milliGRAM(s) IntraMuscular once  heparin   Injectable 5000 Unit(s) SubCutaneous every 8 hours  hydrALAZINE 25 milliGRAM(s) Oral every 8 hours  insulin glargine Injectable (LANTUS) 28 Unit(s) SubCutaneous every morning  insulin glargine Injectable (LANTUS) 50 Unit(s) SubCutaneous at bedtime  insulin lispro (ADMELOG) corrective regimen sliding scale   SubCutaneous three times a day before meals  insulin lispro (ADMELOG) corrective regimen sliding scale   SubCutaneous at bedtime  insulin lispro Injectable (ADMELOG) 20 Unit(s) SubCutaneous three times a day before meals  levothyroxine 50 MICROGram(s) Oral daily  melatonin 3 milliGRAM(s) Oral at bedtime  metoprolol succinate  milliGRAM(s) Oral <User Schedule>  metoprolol succinate ER 50 milliGRAM(s) Oral <User Schedule>  senna 2 Tablet(s) Oral at bedtime    PRN Inpatient Medications  ALPRAZolam 0.25 milliGRAM(s) Oral every 8 hours PRN  baclofen 2.5 milliGRAM(s) Oral once PRN  polyethylene glycol 3350 17 Gram(s) Oral two times a day PRN      REVIEW OF SYSTEMS  --------------------------------------------------------------------------------  General: no fever  CVS: no chest pain  RESP: no sob, no cough  ABD: no abdominal pain  : no dysuria  MSK: no edema     VITALS/PHYSICAL EXAM  --------------------------------------------------------------------------------  T(C): 36.6 (01-28-21 @ 08:00), Max: 37.2 (01-28-21 @ 00:40)  HR: 77 (01-28-21 @ 08:00) (74 - 90)  BP: 106/68 (01-28-21 @ 08:00) (98/62 - 117/69)  RR: 18 (01-28-21 @ 08:00) (18 - 18)  SpO2: 96% (01-28-21 @ 08:00) (95% - 98%)  Wt(kg): --    01-27-21 @ 07:01 - 01-28-21 @ 07:00  --------------------------------------------------------  IN: 1200 mL / OUT: 870 mL / NET: 330 mL      Physical Exam:  	Gen: NAD  	HEENT: MMM  	Pulm: CTA B/L  	CV: S1S2  	Abd: Soft, +BS  	Ext: No LE edema B/L                      Neuro: Awake   	Skin: Warm and Dry       LABS/STUDIES  --------------------------------------------------------------------------------              10.7   10.99 >-----------<  516      [01-28-21 @ 06:30]              35.6     141  |  101  |  108  ----------------------------<  129      [01-28-21 @ 07:47]  3.9   |  20  |  2.13        Ca     10.1     [01-28-21 @ 07:47]      Mg     3.0     [01-28-21 @ 07:47]      Phos  5.1     [01-28-21 @ 07:47]    Creatinine Trend:  SCr 2.13 [01-28 @ 07:47]  SCr 2.14 [01-27 @ 06:31]  SCr 2.19 [01-26 @ 07:14]  SCr 2.41 [01-25 @ 19:43]  SCr 2.32 [01-25 @ 05:56]    Urinalysis - [01-22-21 @ 22:32]      Color Light Yellow / Appearance Clear / SG 1.011 / pH 5.5      Gluc Trace / Ketone Negative  / Bili Negative / Urobili Negative       Blood Negative / Protein 30 mg/dL / Leuk Est Large / Nitrite Negative      RBC 2 / WBC 35 / Hyaline 1 / Gran  / Sq Epi  / Non Sq Epi 2 / Bacteria Negative      Ferritin 111      [01-13-21 @ 01:42]  HbA1c 7.5      [10-08-19 @ 14:30]  TSH 2.15      [01-13-21 @ 10:07]

## 2021-01-28 NOTE — PROGRESS NOTE ADULT - PROBLEM SELECTOR PLAN 4
Please have pt come back in 1 month for another bp check again   Patient with persistent neck pain  Pain increased with movement, and with tenderness to palpation  Most likely MSK in nature  - pain control with tylenol ATC and lidocaine patch  - will obtain CT cervical spine to r/o fracture/herniation

## 2021-01-28 NOTE — PROGRESS NOTE ADULT - ATTENDING COMMENTS
75F w/ PMHx of severe ICM (EF 20%), severe MR s/p mitraclip, HTN, HLD, DM2, CKD b/l CR~2 p/w sob due to acute on chronic chf.    Transitioned to PO diuretics and remains on RA. Today anxious appearing, asking for water. CT neck was negative for fx. Will f/u CHF recs for dc diuretic dose. Dc planning to [facility].    #acute on chronic HFrEF  -ECG NSR, pvcs, RA deviation, diffuse TWIs and ST deppressions in precordial leads  -tolerating BB  -bumex 2mg daily -adjust per CHF team  -tele  -cards consult appreciated  -trend LFTs daily  -strict is/os and daily standing weights    #DM2  -titrate insulin prn - lantus bid  -endo following    #rash  -diffuse erythematous, raised pruritic papules  ?severe eczema  -start steroid cream, hydroxycine prn, permetherin per derm  -improving    #neck pain  -CT c spine neg  -standing tylenol  -lidocaine patch  -flexeril prn low dose    #decreased LLE pulses - vasc doppler negative    improving, likely approaching euvolemia - weight stable for last 3 days    Tor Newberry MD  Division of Hospital Medicine  Pager: [phone]  Office: [phone]

## 2021-01-29 LAB
ANION GAP SERPL CALC-SCNC: 17 MMOL/L — SIGNIFICANT CHANGE UP (ref 5–17)
BUN SERPL-MCNC: 108 MG/DL — HIGH (ref 7–23)
CALCIUM SERPL-MCNC: 9.9 MG/DL — SIGNIFICANT CHANGE UP (ref 8.4–10.5)
CHLORIDE SERPL-SCNC: 97 MMOL/L — SIGNIFICANT CHANGE UP (ref 96–108)
CO2 SERPL-SCNC: 20 MMOL/L — LOW (ref 22–31)
CREAT SERPL-MCNC: 2.04 MG/DL — HIGH (ref 0.5–1.3)
GLUCOSE BLDC GLUCOMTR-MCNC: 199 MG/DL — HIGH (ref 70–99)
GLUCOSE BLDC GLUCOMTR-MCNC: 200 MG/DL — HIGH (ref 70–99)
GLUCOSE BLDC GLUCOMTR-MCNC: 203 MG/DL — HIGH (ref 70–99)
GLUCOSE BLDC GLUCOMTR-MCNC: 262 MG/DL — HIGH (ref 70–99)
GLUCOSE BLDC GLUCOMTR-MCNC: 271 MG/DL — HIGH (ref 70–99)
GLUCOSE SERPL-MCNC: 188 MG/DL — HIGH (ref 70–99)
HCT VFR BLD CALC: 33.8 % — LOW (ref 34.5–45)
HGB BLD-MCNC: 10.5 G/DL — LOW (ref 11.5–15.5)
MAGNESIUM SERPL-MCNC: 2.7 MG/DL — HIGH (ref 1.6–2.6)
MCHC RBC-ENTMCNC: 26.4 PG — LOW (ref 27–34)
MCHC RBC-ENTMCNC: 31.1 GM/DL — LOW (ref 32–36)
MCV RBC AUTO: 85.1 FL — SIGNIFICANT CHANGE UP (ref 80–100)
NRBC # BLD: 0 /100 WBCS — SIGNIFICANT CHANGE UP (ref 0–0)
NT-PROBNP SERPL-SCNC: 2451 PG/ML — HIGH (ref 0–300)
PHOSPHATE SERPL-MCNC: 5.1 MG/DL — HIGH (ref 2.5–4.5)
PLATELET # BLD AUTO: 553 K/UL — HIGH (ref 150–400)
POTASSIUM SERPL-MCNC: 3.8 MMOL/L — SIGNIFICANT CHANGE UP (ref 3.5–5.3)
POTASSIUM SERPL-SCNC: 3.8 MMOL/L — SIGNIFICANT CHANGE UP (ref 3.5–5.3)
RBC # BLD: 3.97 M/UL — SIGNIFICANT CHANGE UP (ref 3.8–5.2)
RBC # FLD: 16.2 % — HIGH (ref 10.3–14.5)
SODIUM SERPL-SCNC: 134 MMOL/L — LOW (ref 135–145)
WBC # BLD: 11.36 K/UL — HIGH (ref 3.8–10.5)
WBC # FLD AUTO: 11.36 K/UL — HIGH (ref 3.8–10.5)

## 2021-01-29 PROCEDURE — 99233 SBSQ HOSP IP/OBS HIGH 50: CPT

## 2021-01-29 PROCEDURE — 99232 SBSQ HOSP IP/OBS MODERATE 35: CPT | Mod: GC

## 2021-01-29 RX ORDER — BUMETANIDE 0.25 MG/ML
2 INJECTION INTRAMUSCULAR; INTRAVENOUS
Refills: 0 | Status: DISCONTINUED | OUTPATIENT
Start: 2021-01-30 | End: 2021-02-02

## 2021-01-29 RX ORDER — METOPROLOL TARTRATE 50 MG
100 TABLET ORAL EVERY 12 HOURS
Refills: 0 | Status: DISCONTINUED | OUTPATIENT
Start: 2021-01-29 | End: 2021-02-02

## 2021-01-29 RX ORDER — INSULIN LISPRO 100/ML
16 VIAL (ML) SUBCUTANEOUS
Refills: 0 | Status: DISCONTINUED | OUTPATIENT
Start: 2021-01-29 | End: 2021-02-02

## 2021-01-29 RX ORDER — HYDRALAZINE HCL 50 MG
37.5 TABLET ORAL EVERY 8 HOURS
Refills: 0 | Status: DISCONTINUED | OUTPATIENT
Start: 2021-01-29 | End: 2021-02-02

## 2021-01-29 RX ORDER — BUMETANIDE 0.25 MG/ML
1 INJECTION INTRAMUSCULAR; INTRAVENOUS
Refills: 0 | Status: DISCONTINUED | OUTPATIENT
Start: 2021-01-29 | End: 2021-02-02

## 2021-01-29 RX ORDER — CYCLOBENZAPRINE HYDROCHLORIDE 10 MG/1
5 TABLET, FILM COATED ORAL THREE TIMES A DAY
Refills: 0 | Status: DISCONTINUED | OUTPATIENT
Start: 2021-01-29 | End: 2021-02-02

## 2021-01-29 RX ORDER — BUMETANIDE 0.25 MG/ML
1 INJECTION INTRAMUSCULAR; INTRAVENOUS DAILY
Refills: 0 | Status: DISCONTINUED | OUTPATIENT
Start: 2021-01-29 | End: 2021-01-29

## 2021-01-29 RX ADMIN — Medication 37.5 MILLIGRAM(S): at 21:34

## 2021-01-29 RX ADMIN — Medication 25 MILLIGRAM(S): at 04:58

## 2021-01-29 RX ADMIN — HEPARIN SODIUM 5000 UNIT(S): 5000 INJECTION INTRAVENOUS; SUBCUTANEOUS at 21:35

## 2021-01-29 RX ADMIN — ATORVASTATIN CALCIUM 80 MILLIGRAM(S): 80 TABLET, FILM COATED ORAL at 21:34

## 2021-01-29 RX ADMIN — Medication 81 MILLIGRAM(S): at 12:02

## 2021-01-29 RX ADMIN — Medication 100 MILLIGRAM(S): at 18:06

## 2021-01-29 RX ADMIN — Medication 16 UNIT(S): at 18:04

## 2021-01-29 RX ADMIN — BUMETANIDE 2 MILLIGRAM(S): 0.25 INJECTION INTRAMUSCULAR; INTRAVENOUS at 04:58

## 2021-01-29 RX ADMIN — Medication 2: at 21:32

## 2021-01-29 RX ADMIN — INSULIN GLARGINE 25 UNIT(S): 100 INJECTION, SOLUTION SUBCUTANEOUS at 08:25

## 2021-01-29 RX ADMIN — HEPARIN SODIUM 5000 UNIT(S): 5000 INJECTION INTRAVENOUS; SUBCUTANEOUS at 04:58

## 2021-01-29 RX ADMIN — Medication 650 MILLIGRAM(S): at 04:58

## 2021-01-29 RX ADMIN — CALAMINE AND ZINC OXIDE AND PHENOL 1 APPLICATION(S): 160; 10 LOTION TOPICAL at 04:59

## 2021-01-29 RX ADMIN — CALAMINE AND ZINC OXIDE AND PHENOL 1 APPLICATION(S): 160; 10 LOTION TOPICAL at 21:35

## 2021-01-29 RX ADMIN — Medication 2.5 MILLIGRAM(S): at 07:53

## 2021-01-29 RX ADMIN — Medication 1 TABLET(S): at 13:52

## 2021-01-29 RX ADMIN — Medication 4: at 18:04

## 2021-01-29 RX ADMIN — Medication 20 UNIT(S): at 07:53

## 2021-01-29 RX ADMIN — Medication 50 MICROGRAM(S): at 04:58

## 2021-01-29 RX ADMIN — Medication 1 APPLICATION(S): at 18:05

## 2021-01-29 RX ADMIN — Medication 2: at 07:53

## 2021-01-29 RX ADMIN — Medication 25 MILLIGRAM(S): at 12:02

## 2021-01-29 RX ADMIN — Medication 100 MILLIGRAM(S): at 04:58

## 2021-01-29 RX ADMIN — Medication 0.25 MILLIGRAM(S): at 01:24

## 2021-01-29 RX ADMIN — HEPARIN SODIUM 5000 UNIT(S): 5000 INJECTION INTRAVENOUS; SUBCUTANEOUS at 12:02

## 2021-01-29 RX ADMIN — CYCLOBENZAPRINE HYDROCHLORIDE 5 MILLIGRAM(S): 10 TABLET, FILM COATED ORAL at 21:38

## 2021-01-29 RX ADMIN — CLOPIDOGREL BISULFATE 75 MILLIGRAM(S): 75 TABLET, FILM COATED ORAL at 12:02

## 2021-01-29 RX ADMIN — CALAMINE AND ZINC OXIDE AND PHENOL 1 APPLICATION(S): 160; 10 LOTION TOPICAL at 12:03

## 2021-01-29 RX ADMIN — Medication 3 MILLIGRAM(S): at 21:34

## 2021-01-29 RX ADMIN — SENNA PLUS 2 TABLET(S): 8.6 TABLET ORAL at 21:34

## 2021-01-29 RX ADMIN — BUMETANIDE 1 MILLIGRAM(S): 0.25 INJECTION INTRAMUSCULAR; INTRAVENOUS at 18:05

## 2021-01-29 RX ADMIN — INSULIN GLARGINE 50 UNIT(S): 100 INJECTION, SOLUTION SUBCUTANEOUS at 21:32

## 2021-01-29 RX ADMIN — Medication 6: at 12:06

## 2021-01-29 RX ADMIN — Medication 1 APPLICATION(S): at 04:59

## 2021-01-29 NOTE — PROGRESS NOTE ADULT - SUBJECTIVE AND OBJECTIVE BOX
Chief complaint  Patient is a 75y old  Female who presents with a chief complaint of increased work of breathing (29 Jan 2021 10:15)   Review of systems  Patient appears weak, no hypoglycemic episodes.    Labs and Fingersticks  CAPILLARY BLOOD GLUCOSE      POCT Blood Glucose.: 271 mg/dL (29 Jan 2021 11:55)  POCT Blood Glucose.: 199 mg/dL (29 Jan 2021 07:22)  POCT Blood Glucose.: 307 mg/dL (28 Jan 2021 21:11)  POCT Blood Glucose.: 111 mg/dL (28 Jan 2021 17:25)  POCT Blood Glucose.: 70 mg/dL (28 Jan 2021 16:50)      Anion Gap, Serum: 17 (01-29 @ 07:10)  Anion Gap, Serum: 20 <H> (01-28 @ 07:47)      Calcium, Total Serum: 9.9 (01-29 @ 07:10)  Calcium, Total Serum: 10.1 (01-28 @ 07:47)          01-29    134<L>  |  97  |  108<H>  ----------------------------<  188<H>  3.8   |  20<L>  |  2.04<H>    Ca    9.9      29 Jan 2021 07:10  Phos  5.1     01-29  Mg     2.7     01-29                          10.5   11.36 )-----------( 553      ( 29 Jan 2021 07:05 )             33.8     Medications  MEDICATIONS  (STANDING):  amoxicillin  875 milliGRAM(s)/clavulanate 1 Tablet(s) Oral daily  aspirin enteric coated 81 milliGRAM(s) Oral daily  atorvastatin 80 milliGRAM(s) Oral at bedtime  buMETAnide 1 milliGRAM(s) Oral <User Schedule>  calamine/zinc oxide Lotion 1 Application(s) Topical three times a day  clobetasol 0.05% Cream 1 Application(s) Topical every 12 hours  clopidogrel Tablet 75 milliGRAM(s) Oral daily  dextrose 40% Gel 15 Gram(s) Oral once  dextrose 5%. 1000 milliLiter(s) (50 mL/Hr) IV Continuous <Continuous>  dextrose 5%. 1000 milliLiter(s) (100 mL/Hr) IV Continuous <Continuous>  dextrose 50% Injectable 25 Gram(s) IV Push once  dextrose 50% Injectable 12.5 Gram(s) IV Push once  dextrose 50% Injectable 25 Gram(s) IV Push once  glucagon  Injectable 1 milliGRAM(s) IntraMuscular once  heparin   Injectable 5000 Unit(s) SubCutaneous every 8 hours  hydrALAZINE 37.5 milliGRAM(s) Oral every 8 hours  insulin glargine Injectable (LANTUS) 50 Unit(s) SubCutaneous at bedtime  insulin glargine Injectable (LANTUS) 25 Unit(s) SubCutaneous every morning  insulin lispro (ADMELOG) corrective regimen sliding scale   SubCutaneous three times a day before meals  insulin lispro (ADMELOG) corrective regimen sliding scale   SubCutaneous at bedtime  insulin lispro Injectable (ADMELOG) 16 Unit(s) SubCutaneous three times a day before meals  levothyroxine 50 MICROGram(s) Oral daily  melatonin 3 milliGRAM(s) Oral at bedtime  metoprolol succinate  milliGRAM(s) Oral every 12 hours  senna 2 Tablet(s) Oral at bedtime      Physical Exam  General: Patient comfortable in bed  Vital Signs Last 12 Hrs  T(F): 98.4 (01-29-21 @ 11:36), Max: 98.4 (01-29-21 @ 11:36)  HR: 81 (01-29-21 @ 11:36) (80 - 81)  BP: 105/66 (01-29-21 @ 11:36) (105/66 - 106/70)  BP(mean): --  RR: 18 (01-29-21 @ 11:36) (18 - 18)  SpO2: 97% (01-29-21 @ 11:36) (97% - 98%)       Chief complaint  Patient is a 75y old  Female who presents with a chief complaint of increased work of breathing (29 Jan 2021 10:15)   Review of systems  Patient appears weak, no hypoglycemic episodes.    Labs and Fingersticks  CAPILLARY BLOOD GLUCOSE      POCT Blood Glucose.: 271 mg/dL (29 Jan 2021 11:55)  POCT Blood Glucose.: 199 mg/dL (29 Jan 2021 07:22)  POCT Blood Glucose.: 307 mg/dL (28 Jan 2021 21:11)  POCT Blood Glucose.: 111 mg/dL (28 Jan 2021 17:25)  POCT Blood Glucose.: 70 mg/dL (28 Jan 2021 16:50)      Anion Gap, Serum: 17 (01-29 @ 07:10)  Anion Gap, Serum: 20 <H> (01-28 @ 07:47)      Calcium, Total Serum: 9.9 (01-29 @ 07:10)  Calcium, Total Serum: 10.1 (01-28 @ 07:47)          01-29    134<L>  |  97  |  108<H>  ----------------------------<  188<H>  3.8   |  20<L>  |  2.04<H>    Ca    9.9      29 Jan 2021 07:10  Phos  5.1     01-29  Mg     2.7     01-29                          10.5   11.36 )-----------( 553      ( 29 Jan 2021 07:05 )             33.8     Medications  MEDICATIONS  (STANDING):  amoxicillin  875 milliGRAM(s)/clavulanate 1 Tablet(s) Oral daily  aspirin enteric coated 81 milliGRAM(s) Oral daily  atorvastatin 80 milliGRAM(s) Oral at bedtime  buMETAnide 1 milliGRAM(s) Oral <User Schedule>  calamine/zinc oxide Lotion 1 Application(s) Topical three times a day  clobetasol 0.05% Cream 1 Application(s) Topical every 12 hours  clopidogrel Tablet 75 milliGRAM(s) Oral daily  dextrose 40% Gel 15 Gram(s) Oral once  dextrose 5%. 1000 milliLiter(s) (50 mL/Hr) IV Continuous <Continuous>  dextrose 5%. 1000 milliLiter(s) (100 mL/Hr) IV Continuous <Continuous>  dextrose 50% Injectable 25 Gram(s) IV Push once  dextrose 50% Injectable 12.5 Gram(s) IV Push once  dextrose 50% Injectable 25 Gram(s) IV Push once  glucagon  Injectable 1 milliGRAM(s) IntraMuscular once  heparin   Injectable 5000 Unit(s) SubCutaneous every 8 hours  hydrALAZINE 37.5 milliGRAM(s) Oral every 8 hours  insulin glargine Injectable (LANTUS) 50 Unit(s) SubCutaneous at bedtime  insulin glargine Injectable (LANTUS) 25 Unit(s) SubCutaneous every morning  insulin lispro (ADMELOG) corrective regimen sliding scale   SubCutaneous three times a day before meals  insulin lispro (ADMELOG) corrective regimen sliding scale   SubCutaneous at bedtime  insulin lispro Injectable (ADMELOG) 16 Unit(s) SubCutaneous three times a day before meals  levothyroxine 50 MICROGram(s) Oral daily  melatonin 3 milliGRAM(s) Oral at bedtime  metoprolol succinate  milliGRAM(s) Oral every 12 hours  senna 2 Tablet(s) Oral at bedtime      Physical Exam  General: Patient comfortable in bed  Vital Signs Last 12 Hrs  T(F): 98.4 (01-29-21 @ 11:36), Max: 98.4 (01-29-21 @ 11:36)  HR: 81 (01-29-21 @ 11:36) (80 - 81)  BP: 105/66 (01-29-21 @ 11:36) (105/66 - 106/70)  BP(mean): --  RR: 18 (01-29-21 @ 11:36) (18 - 18)  SpO2: 97% (01-29-21 @ 11:36) (97% - 98%)

## 2021-01-29 NOTE — PROGRESS NOTE ADULT - SUBJECTIVE AND OBJECTIVE BOX
[facility] NEPHROLOGY PRACTICE   MD Dion Dasilva MD, D.O. Ruoru Wong, PA    From 7 AM - 5 PM:  OFFICE: [phone]  Dr. Muhammad cell: [phone]  Dr. Montero cell: [phone]  Dr. Soria cell: [phone]  RASHIDA Smith cell: [phone]    From 5 PM - 7 AM: Answering Service: [phone]  Date of service: 01-29-21 @ 16:16    RENAL FOLLOW UP NOTE  --------------------------------------------------------------------------------  HPI:  Pt seen and examined at bedside.       PAST HISTORY  --------------------------------------------------------------------------------  No significant changes to PMH, PSH, FHx, SHx, unless otherwise noted    ALLERGIES & MEDICATIONS  --------------------------------------------------------------------------------  Allergies    azithromycin (Hives; Pruritus)    Intolerances      Standing Inpatient Medications  amoxicillin  875 milliGRAM(s)/clavulanate 1 Tablet(s) Oral daily  aspirin enteric coated 81 milliGRAM(s) Oral daily  atorvastatin 80 milliGRAM(s) Oral at bedtime  buMETAnide 1 milliGRAM(s) Oral <User Schedule>  calamine/zinc oxide Lotion 1 Application(s) Topical three times a day  clobetasol 0.05% Cream 1 Application(s) Topical every 12 hours  clopidogrel Tablet 75 milliGRAM(s) Oral daily  dextrose 40% Gel 15 Gram(s) Oral once  dextrose 5%. 1000 milliLiter(s) IV Continuous <Continuous>  dextrose 5%. 1000 milliLiter(s) IV Continuous <Continuous>  dextrose 50% Injectable 25 Gram(s) IV Push once  dextrose 50% Injectable 12.5 Gram(s) IV Push once  dextrose 50% Injectable 25 Gram(s) IV Push once  glucagon  Injectable 1 milliGRAM(s) IntraMuscular once  heparin   Injectable 5000 Unit(s) SubCutaneous every 8 hours  hydrALAZINE 37.5 milliGRAM(s) Oral every 8 hours  insulin glargine Injectable (LANTUS) 50 Unit(s) SubCutaneous at bedtime  insulin glargine Injectable (LANTUS) 25 Unit(s) SubCutaneous every morning  insulin lispro (ADMELOG) corrective regimen sliding scale   SubCutaneous three times a day before meals  insulin lispro (ADMELOG) corrective regimen sliding scale   SubCutaneous at bedtime  insulin lispro Injectable (ADMELOG) 16 Unit(s) SubCutaneous three times a day before meals  levothyroxine 50 MICROGram(s) Oral daily  melatonin 3 milliGRAM(s) Oral at bedtime  metoprolol succinate  milliGRAM(s) Oral every 12 hours  senna 2 Tablet(s) Oral at bedtime    PRN Inpatient Medications  ALPRAZolam 0.25 milliGRAM(s) Oral every 8 hours PRN  cyclobenzaprine 5 milliGRAM(s) Oral three times a day PRN  polyethylene glycol 3350 17 Gram(s) Oral two times a day PRN      REVIEW OF SYSTEMS  --------------------------------------------------------------------------------  General: no fever  CVS: no chest pain  RESP: no sob, no cough  ABD: no abdominal pain  : no dysuria,  MSK: no edema     VITALS/PHYSICAL EXAM  --------------------------------------------------------------------------------  T(C): 36.9 (01-29-21 @ 11:36), Max: 36.9 (01-29-21 @ 11:36)  HR: 81 (01-29-21 @ 11:36) (71 - 81)  BP: 105/66 (01-29-21 @ 11:36) (97/61 - 109/66)  RR: 18 (01-29-21 @ 11:36) (18 - 19)  SpO2: 97% (01-29-21 @ 11:36) (97% - 99%)  Wt(kg): --        01-28-21 @ 07:01  -  01-29-21 @ 07:00  --------------------------------------------------------  IN: 960 mL / OUT: 1170 mL / NET: -210 mL    01-29-21 @ 07:01  -  01-29-21 @ 16:16  --------------------------------------------------------  IN: 0 mL / OUT: 600 mL / NET: -600 mL      Physical Exam:  	Gen: NAD  	HEENT: MMM  	Pulm: CTA B/L  	CV: S1S2  	Abd: Soft, +BS  	Ext: No LE edema B/L                      Neuro: Awake   	Skin: Warm and Dry     LABS/STUDIES  --------------------------------------------------------------------------------              10.5   11.36 >-----------<  553      [01-29-21 @ 07:05]              33.8     134  |  97  |  108  ----------------------------<  188      [01-29-21 @ 07:10]  3.8   |  20  |  2.04        Ca     9.9     [01-29-21 @ 07:10]      Mg     2.7     [01-29-21 @ 07:10]      Phos  5.1     [01-29-21 @ 07:10]    Creatinine Trend:  SCr 2.04 [01-29 @ 07:10]  SCr 2.13 [01-28 @ 07:47]  SCr 2.14 [01-27 @ 06:31]  SCr 2.19 [01-26 @ 07:14]  SCr 2.41 [01-25 @ 19:43]    Urinalysis - [01-22-21 @ 22:32]      Color Light Yellow / Appearance Clear / SG 1.011 / pH 5.5      Gluc Trace / Ketone Negative  / Bili Negative / Urobili Negative       Blood Negative / Protein 30 mg/dL / Leuk Est Large / Nitrite Negative      RBC 2 / WBC 35 / Hyaline 1 / Gran  / Sq Epi  / Non Sq Epi 2 / Bacteria Negative      Ferritin 111      [01-13-21 @ 01:42]  HbA1c 7.5      [10-08-19 @ 14:30]  TSH 2.15      [01-13-21 @ 10:07]

## 2021-01-29 NOTE — PROGRESS NOTE ADULT - ASSESSMENT
75y.o F Ukrainian speaking h/o HTN, HLD, DM, CAD s/p CABG 2014 and prior PCI, severe mitral regurgitation (s/p mitral clip 2019), pulmonary HTN (TTE 2019), HF (EF 21 % June 2019), CKD IV not on dialysis (b/l SCr 2-2.2), hypothyroidism admitted for hypoxic respiratory failure 2/2 acute on chronic HFrEF exacerbation in the setting of mitral stenosis c/b MERVIN on CKD.  Pt initially treated w/ IV diuresis but was started on milrinone drip after RHC showed persistent elevated filled pressures. Now s/p  IV diuresis, milrinone dosage decreased but is w/ continued dyspnea despite clinical euvolemic status.  PRN xanax started for possible anxiety component.  Labs w/ intermittent elevations in WBC in the setting of asymptomatic bacteruria - continuing to monitor off abx.

## 2021-01-29 NOTE — PROGRESS NOTE ADULT - SUBJECTIVE AND OBJECTIVE BOX
S: Resting comfortably on room air. Has less neck pain. Denies chest pain or SOB. Review of systems otherwise (-)    Review of Systems:   Constitutional: [ ] fevers, [ ] chills.   Skin: [ ] dry skin. [ ] rashes.  Psychiatric: [ ] depression, [ ] anxiety.   Gastrointestinal: [ ] BRBPR, [ ] melena.   Neurological: [ ] confusion. [ ] seizures. [ ] shuffling gait.   Ears,Nose,Mouth and Throat: [ ] ear pain [ ] sore throat.   Eyes: [ ] diplopia.   Respiratory: [ ] hemoptysis. [ ] shortness of breath  Cardiovascular: See HPI above  Hematologic/Lymphatic: [ ] anemia. [ ] painful nodes. [ ] prolonged bleeding.   Genitourinary: [ ] hematuria. [ ] flank pain.   Endocrine: [ ] significant change in weight. [ ] intolerance to heat and cold.     Review of systems [ x] otherwise negative, [ ] otherwise unable to obtain    FH: no family history of sudden cardiac death in first degree relatives    SH: [ ] tobacco, [ ] alcohol, [ ] drugs       MEDICATIONS  (STANDING):  amoxicillin  875 milliGRAM(s)/clavulanate 1 Tablet(s) Oral daily  aspirin enteric coated 81 milliGRAM(s) Oral daily  atorvastatin 80 milliGRAM(s) Oral at bedtime  buMETAnide 1 milliGRAM(s) Oral <User Schedule>  calamine/zinc oxide Lotion 1 Application(s) Topical three times a day  clobetasol 0.05% Cream 1 Application(s) Topical every 12 hours  clopidogrel Tablet 75 milliGRAM(s) Oral daily  dextrose 40% Gel 15 Gram(s) Oral once  dextrose 5%. 1000 milliLiter(s) (50 mL/Hr) IV Continuous <Continuous>  dextrose 5%. 1000 milliLiter(s) (100 mL/Hr) IV Continuous <Continuous>  dextrose 50% Injectable 25 Gram(s) IV Push once  dextrose 50% Injectable 12.5 Gram(s) IV Push once  dextrose 50% Injectable 25 Gram(s) IV Push once  glucagon  Injectable 1 milliGRAM(s) IntraMuscular once  heparin   Injectable 5000 Unit(s) SubCutaneous every 8 hours  hydrALAZINE 37.5 milliGRAM(s) Oral every 8 hours  insulin glargine Injectable (LANTUS) 50 Unit(s) SubCutaneous at bedtime  insulin glargine Injectable (LANTUS) 25 Unit(s) SubCutaneous every morning  insulin lispro (ADMELOG) corrective regimen sliding scale   SubCutaneous three times a day before meals  insulin lispro (ADMELOG) corrective regimen sliding scale   SubCutaneous at bedtime  insulin lispro Injectable (ADMELOG) 20 Unit(s) SubCutaneous three times a day before meals  levothyroxine 50 MICROGram(s) Oral daily  melatonin 3 milliGRAM(s) Oral at bedtime  metoprolol succinate  milliGRAM(s) Oral every 12 hours  senna 2 Tablet(s) Oral at bedtime    MEDICATIONS  (PRN):  ALPRAZolam 0.25 milliGRAM(s) Oral every 8 hours PRN anxiety  cyclobenzaprine 5 milliGRAM(s) Oral three times a day PRN Muscle Spasm  polyethylene glycol 3350 17 Gram(s) Oral two times a day PRN Constipation      LABS:                          10.5   11.36 )-----------( 553      ( 29 Jan 2021 07:05 )             33.8     Hemoglobin: 10.5 g/dL (01-29 @ 07:05)  Hemoglobin: 10.7 g/dL (01-28 @ 06:30)  Hemoglobin: 10.1 g/dL (01-25 @ 05:56)    01-29    134<L>  |  97  |  108<H>  ----------------------------<  188<H>  3.8   |  20<L>  |  2.04<H>    Ca    9.9      29 Jan 2021 07:10  Phos  5.1     01-29  Mg     2.7     01-29      Creatinine Trend: 2.04<--, 2.13<--, 2.14<--, 2.19<--, 2.41<--, 2.32<--         01-28-21 @ 07:01  -  01-29-21 @ 07:00  --------------------------------------------------------  IN: 960 mL / OUT: 1170 mL / NET: -210 mL        PHYSICAL EXAM  Vital Signs Last 24 Hrs  T(C): 36.9 (29 Jan 2021 11:36), Max: 36.9 (29 Jan 2021 11:36)  T(F): 98.4 (29 Jan 2021 11:36), Max: 98.4 (29 Jan 2021 11:36)  HR: 81 (29 Jan 2021 11:36) (71 - 81)  BP: 105/66 (29 Jan 2021 11:36) (97/61 - 109/66)  BP(mean): --  RR: 18 (29 Jan 2021 11:36) (18 - 19)  SpO2: 97% (29 Jan 2021 11:36) (97% - 99%)      General: Well nourished in no acute distress. Alert and Oriented * 3.   Head: Normocephalic and atraumatic.   Neck: No JVD. No bruits. Supple. Does not appear to be enlarged.   Cardiovascular: + S1,S2 ; RRR Soft systolic murmur at the left lower sternal border. No rubs noted.    Lungs: CTA b/l. No rhonchi, rales or wheezes.   Abdomen: + BS, soft. Non tender. Non distended. No rebound. No guarding.   Extremities: No clubbing/cyanosis/edema.   Neurologic: Moves all four extremities. Full range of motion.   Skin: Warm and moist. The patient's skin has normal elasticity and good skin turgor.   Psychiatric: Appropriate mood and affect.  Musculoskeletal: Normal range of motion, normal strength    TELEMETRY: SR 70s    < from: Cardiac Cath Lab - Adult (01.20.21 @ 10:22) >  PROCEDURE:  --  Right heart catheterization.  --  Sonosite - Diagnostic.  COMPLICATIONS: There were no complications.  DIAGNOSTIC RECOMMENDATIONS: The patient should continuewith the present  medications.  Prepared and signed by  Cesar Jackson M.D.       A/P) 74 y/o female PMH HTN, HLD, DM, CAD s/p CABG 2014 and prior PCI, severe mitral regurgitation (s/p mitral clip 2019), pulmonary HTN, HF (EF 21 % June 2019), CKD IV not on dialysis (b/l SCr 2-2.2), hypothyroidism a/w acute on chronic systolic CHF with NYHA IV functional status.    -CT C spine noted with no fracture, degenerative changes  -repeat echo noted but didn't reassess degree of mitral stenosis  -Continue dapt/statin   -RHC noted above - Filling pressures are appropriate considering LV function and the mitral valve gradient  -Volume status improved - continue PO Bumex  -Continue Toprol XL  -Tolerating hydralazine  -very poor candidate for a primary prevention ICD  -no further inpatient cardiac w/u planned  -D/C planning per primary team  -f/u with Dr. Sotelo on Friday 2/5 at 2:20 PM     ARTURO ShahC  Pager: [phone]

## 2021-01-29 NOTE — PROVIDER CONTACT NOTE (OTHER) - ACTION/TREATMENT ORDERED:
Wait for pt to void.
Admin lantus now, consider endo consult, adjust premeal insulin.
Provider aware, agree with administering insulin. Recheck BG in 1 hr.
Continue cardiac monitoring. No intervention needed at this time.
Follow hypoglycemia protocol. Provider made aware. RN will repeat FS q15min until FS >100.
Follow sliding scale as ordered.
IV lasix.
MD at the bedside to evaluate patient. Orders to continue current med management and cardiac monitoring. D/C on hold until further orders per recommendations of HF team.
MD ordered to recheck FS in 2 hours. Also increased the pre meal dose from 10  to 15 units and increased the Lantus insulin from 25 to 30 units. Will continue to monitor the patient.
Monitor closely and increase to 5LPM.
Monitor tachypnea overnight.
Ok to hold pre-meal insulin at this time.
to let the provider know if it happens again and longer than 7 seconds. Will continue to monitor the patient.
give medication as ordered per provider.
Pt will be placed back on bipap, and will continue to monitor.

## 2021-01-29 NOTE — PROGRESS NOTE ADULT - PROBLEM SELECTOR PLAN 3
[place] ambulatory encounter(APSO):    [facility]  [address] [facility] [address]  [phone]    Assessment & Plan:  1. Depression, major, single episode, moderate (CMS/HCC)    2. ADHD, predominantly inattentive type    3. Relationship dysfunction      Return in about 5 months (around 1/8/2020).  See in January after graduation, applying to law school perhaps.  Call sooner if worsening, consider counseling. Continue Prozac and Adderall.      The patient indicates understanding of these issues and agrees with the plan.       CHIEF COMPLAINT:   Chief Complaint   Patient presents with   • Follow-up     Patient would like to discuss prozac. Patient would like to stay on it.          History of present illness:    She is complaining of/requesting evaluation for had been in a dating relationship and that relationship from college that ended in May.  She is starting to come out of her depression was worse at beginning of summer.  She has spent the summer here, all her roommates have graduated.  Is living with friends this last semester.      No other new concerns.   She wants emotional certification form for her cat, will need to get animal certified before we can do that. She sleeps with cat and finds it  Comforting.     PAST HISTORIES:  I have reviewed the past medical history,  social history, medications and allergies listed in the medical record as obtained by my nursing staff and support staff and agree with their documentation.  ALLERGIES:  No Known Allergies  Current Outpatient Medications   Medication Sig Dispense Refill   • fluoxetine (PROZAC) 40 MG capsule Take 1 capsule by mouth daily. 30 capsule 1   • amphetamine-dextroamphetamine (ADDERALL XR) 20 MG 24 hr capsule One PO AM, One po early afternoon 60 capsule 0     No current facility-administered medications for this visit.      History reviewed. No pertinent past medical history.  Past  Raised urticarial rash over UE and back   -Derm consulted, recs as below:  -clobetasol 0.05% ointment BID   -permethrin 5% cream, apply to whole body from neck down at bedtime x 1 night and rinse off in morning (8-10 hours later). Repeat again in 1 week  -symptoms improved this AM Surgical History:   Procedure Laterality Date   • Removal of foreign body  08/04/2012    from vagina w/o incision            PHYSICAL exam:    Vitals:    08/08/19 0811   BP: 118/80   Pulse: 80   Temp: 97.7 °F (36.5 °C)   TempSrc: Temporal   Weight: 72.8 kg   Height: 5' 9\" (1.753 m)     GENERAL:  Pleasant, Cooperative in no distress.  Color is good.      MENTAL STATUS EXAM:   Psychomotor: Sharmaine Márquez is dressed and groomed casually.  Speech is fluent at a normal rate and rhythm. Gait is normal.  Eye contact is normal.  Mood is described as \"better\" .  Affect is pleasant, tearful at times discussing her relationship.  She notes mildly feeling sad and depressed.  She denies feeling hopeless, helpless or worthless. Thought Content:about the breakup of the relationship and her upcoming semester.  She denies any suicide ideation, homicidal ideation, intent or plan. Sleep is reported to be increased 10 hours a night. Appetite is good . Thought Process: Her thoughts were generally logical, relevant, coherent. There were no loose associations or flight of ideas.  She denies hallucinations. There is no evidence of delusional thinking. She is oriented to person, place and time. Memory for recent and remote events is intact. Judgment and insight appear to be good.     Over 50% of the 20 minute visit was spent in counseling and coordination of care.  Brief supportive therapy given.

## 2021-01-29 NOTE — PROGRESS NOTE ADULT - PROBLEM SELECTOR PLAN 2
-TTE 1/13 showed EF of 20% with global LV and RV dysfunction w/ MS.   Repeat TTE 1/19 showed severely decreased LV fx and grossly decreased RV fx w/ EF 25-30%  - Heart failure and cardiology following  - see above for history.    - s/p milrinone drip  - s/p bumex drip.  Started back on home regimen on 1/26  - per HF recs, will increase toprol XL to 100mg in AM and 50mg in PM

## 2021-01-29 NOTE — PROGRESS NOTE ADULT - PROBLEM SELECTOR PLAN 1
- Would give an additional bumex 1mg PO this afternoon and continue current dose of bumex 2mg PO daily  - Increase Toprol XL to 100 mg BID; HR goal 60-70  - continue with HDZN 25 mg q8h  - daily standing weight and strict I&Os - Will increase Bumex to 2mg in the AM and 1mg in the PM  - Increase Toprol XL to 100 mg BID; HR goal 60-70  - Increase HDZN to 37.5 mg q8h, hold for SBP <90  - daily standing weight and strict I&Os

## 2021-01-29 NOTE — PROGRESS NOTE ADULT - ATTENDING COMMENTS
75F w/ PMHx of severe ICM (EF 20%), severe MR s/p mitraclip, HTN, HLD, DM2, CKD b/l CR~2 p/w sob due to acute on chronic chf.    Transitioned to PO diuretics and remains on RA. Today anxious appearing, c/o chills. Given persistent leukocytosis will treat prior urine ecoli and start augmentin today for short course--does not need to stay in house for this and can f/u cbc w/ PMD as outpt. Will f/u CHF recs for dc diuretic dose. Dc planning to [facility].    #acute on chronic HFrEF  -ECG NSR, pvcs, RA deviation, diffuse TWIs and ST deppressions in precordial leads  -tolerating BB  -bumex 2mg daily -adjust per CHF team  -tele  -cards consult appreciated  -trend LFTs daily  -strict is/os and daily standing weights    #DM2  -titrate insulin prn - lantus bid  -endo following    #rash  -diffuse erythematous, raised pruritic papules  ?severe eczema  -start steroid cream, hydroxycine prn, permetherin per derm  -improving    #neck pain  -CT c spine neg  -standing tylenol  -lidocaine patch  -flexeril prn low dose    #?UTI  -augmentin x 5 days    #decreased LLE pulses - vasc doppler negative    improving, likely approaching euvolemia - weight stable for last 3 days    Tor Newberry MD  Division of Hospital Medicine  Pager: [phone]  Office: [phone]

## 2021-01-29 NOTE — PROVIDER CONTACT NOTE (OTHER) - REASON
Bp change
Pt has not voided, pending STAT UA
, 424 on repeat
Pt tachypneic, complains of shortness of breath
Hypoglycemia
Patient , repeated 473
Patient noted with hyperglycemia with FS blood sugar- 420 and repeat - 417
Patient with 4 beats of WCT for the first time, asymptomatic.
Pt c/o CP and Chills.
Pt complains of SOB
SOB
patient had PSVT on tele monitor for 7 seconds with  - 1st time
premeal insulin dose of 10 units
pt continues to complain of SOB

## 2021-01-29 NOTE — PROVIDER CONTACT NOTE (OTHER) - DATE AND TIME:
22-Jan-2021 23:00
20-Jan-2021 21:13
21-Jan-2021 11:35
22-Jan-2021 18:52
13-Jan-2021 03:26
13-Jan-2021 14:50
15-Dusty-2021 18:32
16-Jan-2021 16:45
16-Jan-2021 21:39
16-Jan-2021 22:40
16-Jan-2021 23:55
26-Jan-2021 11:59
29-Jan-2021 12:00
13-Jan-2021 21:02
18-Jan-2021 21:05

## 2021-01-29 NOTE — PROVIDER CONTACT NOTE (OTHER) - SITUATION
Patient , repeated 473
Pt tachypneic, complaining of shortness of breath.
FS 53
Pt had BP of 94/53 when rechecked 91/56. Bumex ordered.
, 424 on repeat
FS 92, pt ordered for 10 units of pre-meal insulin
Patient noted with hyperglycemia with FS blood sugar- 420 and repeat - 417
Patient with 4 beats of WCT for the first time, asymptomatic.
Pt blood sugar 436.
Pt c/o CP and Chills. Received call from Son-in-law; Mohsin that patient is c/o CP and Chills and cannot be dc'd to rehab today.
Pt complains of SOB.
Pt had 1x incontinent episode not captured by primafit, does not presently feel urge to void, pending STAT UA.
Pt has not felt relief of sob from increased O2.
Pt still complaining about shortness of breath. Unrelieved by increased O2 and lasix.
patient had PSVT on tele monitor for 7 seconds with  - 1st time

## 2021-01-29 NOTE — PROGRESS NOTE ADULT - ASSESSMENT
Briefly, 76 yo F with HTN, HLD, DM2 (A1c 7.5 10/19), CAD s/p CABG (LIMA to LAD, SVG to OM, SVG to PDA) and prior PCI, ICM HFrEF (EF 20-25%, LVEDD 4.7 cm), severe mitral regurgitation s/p mitral clip (6/19; mean MV gradient 7-8), severe pulmonary HTN, CKD IV (b/l Cr 1.9-2.2), hypothyroidism who BIBEMS 2/2 worsening work of breathing presents in acute on chronic systolic CHF with NYHA IV functional status. Was notably on midodrine as outpatient since discharge from [facility] in 10/19. Has been having increased work of breathing at home with failure to thrive. Initially required Bipap and was given bumex with some improvement. Continues to be dyspneic. Etiology of her symptoms may be secondary to degree of mitral stenosis given HR in 80-90s with mean gradient of 8 on TTE. Also, per nursing staff some of her dyspnea may be anxiety/pain related. She is s/p RHC 1/20 which showed mildly elevated filling pressures with preserved CO and was started on milrinone to assist with diuresis given her fluctuating renal function, which was weaned off 1/26.    She currently appears mildly volume up on exam. Her BUN/Cr remain stable off milrinone and she is tolerating low dose vasodilators.    1/20/20 RHC: RA 15, RV 49/10, PA 50/21 32, PCWP 19, PA 55.4, Ao 98. [place] 3.5/2.5

## 2021-01-29 NOTE — PROGRESS NOTE ADULT - SUBJECTIVE AND OBJECTIVE BOX
Subjective:  - NAEO  - Ongoing neck pain    Medications:  ALPRAZolam 0.25 milliGRAM(s) Oral every 8 hours PRN  amoxicillin  875 milliGRAM(s)/clavulanate 1 Tablet(s) Oral daily  aspirin enteric coated 81 milliGRAM(s) Oral daily  atorvastatin 80 milliGRAM(s) Oral at bedtime  buMETAnide 1 milliGRAM(s) Oral <User Schedule>  calamine/zinc oxide Lotion 1 Application(s) Topical three times a day  clobetasol 0.05% Cream 1 Application(s) Topical every 12 hours  clopidogrel Tablet 75 milliGRAM(s) Oral daily  cyclobenzaprine 5 milliGRAM(s) Oral three times a day PRN  dextrose 40% Gel 15 Gram(s) Oral once  dextrose 5%. 1000 milliLiter(s) IV Continuous <Continuous>  dextrose 5%. 1000 milliLiter(s) IV Continuous <Continuous>  dextrose 50% Injectable 25 Gram(s) IV Push once  dextrose 50% Injectable 12.5 Gram(s) IV Push once  dextrose 50% Injectable 25 Gram(s) IV Push once  glucagon  Injectable 1 milliGRAM(s) IntraMuscular once  heparin   Injectable 5000 Unit(s) SubCutaneous every 8 hours  hydrALAZINE 37.5 milliGRAM(s) Oral every 8 hours  insulin glargine Injectable (LANTUS) 50 Unit(s) SubCutaneous at bedtime  insulin glargine Injectable (LANTUS) 25 Unit(s) SubCutaneous every morning  insulin lispro (ADMELOG) corrective regimen sliding scale   SubCutaneous three times a day before meals  insulin lispro (ADMELOG) corrective regimen sliding scale   SubCutaneous at bedtime  insulin lispro Injectable (ADMELOG) 16 Unit(s) SubCutaneous three times a day before meals  levothyroxine 50 MICROGram(s) Oral daily  melatonin 3 milliGRAM(s) Oral at bedtime  metoprolol succinate  milliGRAM(s) Oral every 12 hours  polyethylene glycol 3350 17 Gram(s) Oral two times a day PRN  senna 2 Tablet(s) Oral at bedtime      ICU Vital Signs Last 24 Hrs  T(C): 36.9, Max: 36.9 ( @ 11:36)  HR: 81 (71 - 81)  BP: 105/66 (97/61 - 109/66)  BP(mean): --  ABP: --  ABP(mean): --  RR: 18 (18 - 19)  SpO2: 97% (97% - 99%)    Weight in k.8 (21)  Weight in k (21)      I&O's Summary Last 24 Hrs    IN: 960 mL / OUT: 1170 mL / NET: -210 mL      Tele: SR 80s    Physical Exam:    General: Frail. resting comfortably in bed  HEENT: EOM intact.  Neck: JVP difficult to assess, appears moderately elevated  Chest: Unlabored  CV: Regular, Normal S1 and S2. II/VI SM. Radial pulses normal. No LE edema.  Abdomen: Soft, non-distended, non-tender  Skin: No rashes or skin breakdown. Warm peripherally  Neurology: Alert and oriented times three. Sensation intact  Psych: Depressed mood    Labs:    ( 21 @ 07:05 )               10.5   11.36 )--------( 553                  33.8     ( 21 @ 07:10 )     134  |  97  |  108  ---------------------<  188  3.8  |  20  |  2.04    Ca 9.9  Phos 5.1  Mg 2.7    ( 21 @ 05:14 )  TropHS 55    / CK x     / CKMB x      ( 21 @ 17:42 )  TropHS 56    / CK x     / CKMB x        Serum Pro-Brain Natriuretic Peptide: 2451 (21 @ 07:10)

## 2021-01-29 NOTE — PROGRESS NOTE ADULT - PROBLEM SELECTOR PLAN 1
Patient is not a candidate for tight sugar control given her age and CKD. Will continue Lantus 25u AM, 50u PM.  Will decrease Admelog to be 16u before each meal and continue coverage scale. Will continue monitoring FS, log, and FU.  Suggest DC home on current basal bolus insulin regimen with endo FU 4 weeks.  Patient counseled for compliance with consistent low carb diet.

## 2021-01-29 NOTE — PROGRESS NOTE ADULT - ASSESSMENT
74 yo F w/ PMH of CKD stage 4 (baseline Cr 1.9-2.2) HTN, T2DM, CAD s/p CABG X3 (2014 at [facility]), non-dilated ICM (EF 20-25%), severe mitral regurgitation s/p mitral clip (6/13/19), severe pulm HTN, hypothyroidism who presented for evaluation of SOB and was admitted for ADHF.    CKD stage 4  - baseline Cr 1.9-2.2; has had recurrent MERVIN's over the years  - CKD is likely 2/2 recurrent MERVIN's + underlying DM/HTN  - Scr stable today. BUN elevated due to diuretics. Pt on oral diuretics. Diuretics per heart failure team   - Avoid hypotension   - renal sonogram in the past with simple renal cyst  - d/w daughter and son-in-law on 01/23 about worsening renal function and if no improvement likely need for dialysis. They were also informed that she will be a poor candidate for HD in view of CHF. They understand but wants HD if needed. Consent for HD is in chart.  - She is not uremic, monitor at present  - Avoid nephrotoxins including NSAIDs, IV contrast (if possible), [employer] products  - monitor I/O's accurately    HTN  BP controlled  Low salt diet   MOnitor BP    Proteinuria/Hematuria  - likely from underlying DM  - has 1.8 grams proteinuria  - Hep B, Hep C, HIV RF, C3, C4, p-ANCA, c-ANCA, RPR neg  - weak gamma-migrating protein, weak IgG lambda protein band noted in the past. Pt to follow w/ heme   - DEAN 1:320 positive  - RPR neg, FTA +

## 2021-01-29 NOTE — PROVIDER CONTACT NOTE (OTHER) - ASSESSMENT
Patient A 7 O x 4. Asymptomatic. Insulin admelog given 10 units pre meals  and 6 units of corrective sliding scale ( total 16 units). Patient eating dinner now.
Patient alert and oriented x 3 with mild anxiety. VSS. No ectopy on tele. Afebrile. Reports no diaphoresis/ N&V. Pt to begin first dose of Bactrim DS.
Patient alert and oriented x 4. Sleeping during rounds. VSS. Denies Cp/SOB/palpitations.
Patient is A&Ox4, VSS. No complaints.
Pt A&Ox3-4, forgetful at times, denies CP/dizziness. Pt not in distress but found to be tachypneic and complaining of shortness of breath.
Pt AOx4. No s/s of chest pain or sob. No s/s of pain or distress.
Pt at baseline, denies need to void at this time. Bladder scan performed, volume 276.
Pt is confused at times, restless, repeatedly taking off bipap mask; asking for food/drink. Unable to eat at this time d/t bipap.
Pt not wanting to eat lunch
VSS Bp- 129/76, HR- 103, Spo2- 99 % on O2 @ 4lpm via n/c, temp-98. Patient was getting cleaned in bed during the ectopy. denies chest pain, palpitation. patient with tachypnea.
VSS except pt is tachypneic on 5LNC. RR 28.
VSS on 4 L NC see flowsheet. Pt RR 24 when reassessed by RN.
VSS see flowsheet 5LNC. RR 27. No chest pain or any other complaints of pain.
Pt does not complain of headache, dizziness, chest pain or sob. BP 91/56.
Pt noted with anxiety, VS per flowsheet. Becomes tachypneic at times, no wheezing noted.

## 2021-01-29 NOTE — PROVIDER CONTACT NOTE (OTHER) - NAME OF MD/NP/PA/DO NOTIFIED:
Riddhi, T8
T8 Rosa
MD Dhillon
BRANDY Keyes
BRANDY baez
Dr. Alejo Lentz/ Jacquie Team 8
Dr. Peace
MD Jacquie team 8
Riddhi, T8
T8
Teena Puente MD
MD Rosa T8
Riddhi BORJAS

## 2021-01-29 NOTE — PROVIDER CONTACT NOTE (OTHER) - BACKGROUND
Adm dx Acute respiratory distress, PMH pulm. HTN, HF, CAD, HLD, DM
76 y/o F admitted for acute respiratory distress. PMH: CAD, heart failure, DM.
76 y/o F admitted for acute respiratory distress. PMH: CAD, heart failure, DM.
Admitted for acute respiratory distress.
Admitted for acute respiratory distress.
Patient 74y old female admitted for  PNA versus HF exacerbation. S/P Primacor and Bumex gtt.   severe mitral regurgitation (s/p mitral clip 2019), pulmonary HTN (TTE 2019), HF (EF 21 % June 2019.
Patient 75years old female admitted for  worsening work of breathing likely secondary to PNA and/or heart failure/cardiac origin.  S/P bumex gtt on Primacor
Pt admitted for respiratory distress.
pt 76 y/o F admitted for acute respiratory distress. PMH heart failure, CAD, DM
pt with h/o of type2 DM
74 y/o F admitted for acute resp distress. PMH: heart failure, dm, cad.
Pt admitted with respiratory distress, hx of HTN, HLD, DM, CAD
admitted for acute respiratory distress. hx HTN, HLD, DM, CAD s/p CABG, HF. A1C: 7.2
Pt 76 y/o F admitted for acute resp distress. PMH: heart failure, cad, dm.
Adm dx: acute respiratory distress  PMH: DM, HF, pulm HTN, DAD, hypothyroid

## 2021-01-29 NOTE — PROGRESS NOTE ADULT - ASSESSMENT
Assessment  DMT2: 75y Female with DM T2 with hyperglycemia, A1C 7.1%, was on insulin at home, now on basal bolus insulin, blood sugars fluctuating/trending down postprandially, no hypoglycemic episodes, not eating much, DC delayed 2/2 chest pain and chills.  HF: on medications, stable, monitored, FU Cardiology.  Hypothyroidism: On Synthroid 50 mcg po daily, compliant with Synthroid intake, asymptomatic, euthyroid.  CKD: Monitor labs/BMP      Jillian Banks MD  Cell: [phone]  Office: [phone]       Assessment  DMT2: 75y Female with DM T2 with hyperglycemia, A1C 7.1%, was on insulin at home, now on basal bolus insulin, blood sugars fluctuating/trending down  postprandially, no hypoglycemic episodes, not eating much, DC delayed 2/2 chest pain and chills.  HF: on medications, stable, monitored, FU Cardiology.  Hypothyroidism: On Synthroid 50 mcg po daily, compliant with Synthroid intake, asymptomatic, euthyroid.  CKD: Monitor labs/BMP      Jillian Banks MD  Cell: [phone]  Office: [phone]

## 2021-01-29 NOTE — PROGRESS NOTE ADULT - PROBLEM SELECTOR PLAN 1
- 2/2 CHF exacerbation.  EF 20% w/ mitral stenosis.  CXR on admission showed bilateral predominantly lower lobe hazy opacities which is likely 2/2 fluid.  Not presenting w/ clinical signs of PNA and CT chest showed no focal consolidations.  Monitored off abx.  Blood cx also negative  - pt was being treated w/ IV diuresis which was stopped but restarted after RHC 1/20 showed elevated filling pressures. Milrinone drip and Bumex restarted (bladder scan showed evidence of retention (120cc) @ that time).    - Pt now s/p Bumex drip and milrinone drip - ended 1/26  - pt w/ persistent dyspnea however CT chest 1/25 shows clear lungs  - started PRN xanax for possible anxiety component

## 2021-01-29 NOTE — PROVIDER CONTACT NOTE (OTHER) - RECOMMENDATIONS
Wait for pt to void on own unless UA is needed STAT
Admin lantus now, consider endo consult, adjust premeal insulin.
MD notified
Provider made aware. Change mask to venturi or nonrebreather.
Provider made aware. Follow sliding scale.
Provider made aware. assess for other s/s of resp direstress.
Repeat fingerstick check, Extra insulin
Team 8 called.
Will continue to monitor the patient.
administer SSI insulin and bedtime lantus as ordered. Notify provider.
hold pre-meal insulin
provider made aware. o2 increased.
Provider made aware. push back medication.
T8 notified, will contact respiratory to place patient back on BIPAP.

## 2021-01-29 NOTE — PROGRESS NOTE ADULT - PROBLEM SELECTOR PLAN 4
Patient with persistent neck pain  Pain increased with movement, and with tenderness to palpation  Most likely MSK in nature  - pain control with tylenol ATC and lidocaine patch  - will obtain CT cervical spine to r/o fracture/herniation

## 2021-01-29 NOTE — PROGRESS NOTE ADULT - SUBJECTIVE AND OBJECTIVE BOX
Jacquie Amos MD  Internal Medicine, PGY-3  [phone]    Patient is a 75y old  Female who presents with a chief complaint of increased work of breathing (28 Jan 2021 14:33)      SUBJECTIVE / OVERNIGHT EVENTS: No acute events overnight.       ADDITIONAL ROS:    SKIN: No itching, rashes  MSK: no joint pain, no muscle pain  MEDICATIONS  (STANDING):  aspirin enteric coated 81 milliGRAM(s) Oral daily  atorvastatin 80 milliGRAM(s) Oral at bedtime  buMETAnide 2 milliGRAM(s) Oral daily  calamine/zinc oxide Lotion 1 Application(s) Topical three times a day  clobetasol 0.05% Cream 1 Application(s) Topical every 12 hours  clopidogrel Tablet 75 milliGRAM(s) Oral daily  dextrose 40% Gel 15 Gram(s) Oral once  dextrose 5%. 1000 milliLiter(s) (50 mL/Hr) IV Continuous <Continuous>  dextrose 5%. 1000 milliLiter(s) (100 mL/Hr) IV Continuous <Continuous>  dextrose 50% Injectable 12.5 Gram(s) IV Push once  dextrose 50% Injectable 25 Gram(s) IV Push once  dextrose 50% Injectable 25 Gram(s) IV Push once  glucagon  Injectable 1 milliGRAM(s) IntraMuscular once  heparin   Injectable 5000 Unit(s) SubCutaneous every 8 hours  hydrALAZINE 25 milliGRAM(s) Oral every 8 hours  insulin glargine Injectable (LANTUS) 25 Unit(s) SubCutaneous every morning  insulin glargine Injectable (LANTUS) 50 Unit(s) SubCutaneous at bedtime  insulin lispro (ADMELOG) corrective regimen sliding scale   SubCutaneous three times a day before meals  insulin lispro (ADMELOG) corrective regimen sliding scale   SubCutaneous at bedtime  insulin lispro Injectable (ADMELOG) 20 Unit(s) SubCutaneous three times a day before meals  levothyroxine 50 MICROGram(s) Oral daily  melatonin 3 milliGRAM(s) Oral at bedtime  metoprolol succinate  milliGRAM(s) Oral <User Schedule>  metoprolol succinate ER 50 milliGRAM(s) Oral <User Schedule>  senna 2 Tablet(s) Oral at bedtime    MEDICATIONS  (PRN):  ALPRAZolam 0.25 milliGRAM(s) Oral every 8 hours PRN anxiety  polyethylene glycol 3350 17 Gram(s) Oral two times a day PRN Constipation      CAPILLARY BLOOD GLUCOSE      POCT Blood Glucose.: 199 mg/dL (29 Jan 2021 07:22)  POCT Blood Glucose.: 307 mg/dL (28 Jan 2021 21:11)  POCT Blood Glucose.: 111 mg/dL (28 Jan 2021 17:25)  POCT Blood Glucose.: 70 mg/dL (28 Jan 2021 16:50)  POCT Blood Glucose.: 145 mg/dL (28 Jan 2021 11:51)    I&O's Summary    28 Jan 2021 07:01  -  29 Jan 2021 07:00  --------------------------------------------------------  IN: 960 mL / OUT: 1170 mL / NET: -210 mL        PHYSICAL EXAM:  Vital Signs Last 24 Hrs  T(C): 36.7 (29 Jan 2021 04:03), Max: 36.7 (28 Jan 2021 11:15)  T(F): 98 (29 Jan 2021 04:03), Max: 98.1 (28 Jan 2021 11:15)  HR: 80 (29 Jan 2021 04:03) (71 - 80)  BP: 106/70 (29 Jan 2021 04:03) (97/61 - 109/66)  BP(mean): --  RR: 18 (29 Jan 2021 04:03) (18 - 19)  SpO2: 98% (29 Jan 2021 04:03) (97% - 99%)    PHYSICAL EXAM:  GENERAL: NAD, cachectic, deconditioned  HEAD:  Atraumatic, Normocephalic  EYES: EOMI, PERRLA, conjunctiva and sclera clear  CHEST/LUNG: no wheeze, trace bibasilar crackles   HEART: Regular rate and rhythm; No murmurs, rubs, or gallops  ABDOMEN: Soft, Nontender, Nondistended; Bowel sounds present  EXTREMITIES:  2+ Peripheral Pulses, No clubbing, cyanosis, or edema  NERVOUS SYSTEM:  Alert & Oriented X3, Good concentration; no focal deficits     LABS:                        10.5   11.36 )-----------( 553      ( 29 Jan 2021 07:05 )             33.8     01-29    134<L>  |  97  |  108<H>  ----------------------------<  188<H>  3.8   |  20<L>  |  2.04<H>    Ca    9.9      29 Jan 2021 07:10  Phos  5.1     01-29  Mg     2.7     01-29                          COVID-19 PCR: [place] (01-28-21 @ 09:49)

## 2021-01-30 LAB
ANION GAP SERPL CALC-SCNC: 17 MMOL/L — SIGNIFICANT CHANGE UP (ref 5–17)
BUN SERPL-MCNC: 101 MG/DL — HIGH (ref 7–23)
CALCIUM SERPL-MCNC: 9.6 MG/DL — SIGNIFICANT CHANGE UP (ref 8.4–10.5)
CHLORIDE SERPL-SCNC: 99 MMOL/L — SIGNIFICANT CHANGE UP (ref 96–108)
CO2 SERPL-SCNC: 22 MMOL/L — SIGNIFICANT CHANGE UP (ref 22–31)
CREAT SERPL-MCNC: 1.97 MG/DL — HIGH (ref 0.5–1.3)
GLUCOSE BLDC GLUCOMTR-MCNC: 136 MG/DL — HIGH (ref 70–99)
GLUCOSE BLDC GLUCOMTR-MCNC: 153 MG/DL — HIGH (ref 70–99)
GLUCOSE BLDC GLUCOMTR-MCNC: 199 MG/DL — HIGH (ref 70–99)
GLUCOSE BLDC GLUCOMTR-MCNC: 228 MG/DL — HIGH (ref 70–99)
GLUCOSE SERPL-MCNC: 251 MG/DL — HIGH (ref 70–99)
HCT VFR BLD CALC: 36.8 % — SIGNIFICANT CHANGE UP (ref 34.5–45)
HGB BLD-MCNC: 11.4 G/DL — LOW (ref 11.5–15.5)
MAGNESIUM SERPL-MCNC: 2.6 MG/DL — SIGNIFICANT CHANGE UP (ref 1.6–2.6)
MCHC RBC-ENTMCNC: 26.1 PG — LOW (ref 27–34)
MCHC RBC-ENTMCNC: 31 GM/DL — LOW (ref 32–36)
MCV RBC AUTO: 84.4 FL — SIGNIFICANT CHANGE UP (ref 80–100)
NRBC # BLD: 0 /100 WBCS — SIGNIFICANT CHANGE UP (ref 0–0)
PHOSPHATE SERPL-MCNC: 4.2 MG/DL — SIGNIFICANT CHANGE UP (ref 2.5–4.5)
PLATELET # BLD AUTO: 537 K/UL — HIGH (ref 150–400)
POTASSIUM SERPL-MCNC: 4.4 MMOL/L — SIGNIFICANT CHANGE UP (ref 3.5–5.3)
POTASSIUM SERPL-SCNC: 4.4 MMOL/L — SIGNIFICANT CHANGE UP (ref 3.5–5.3)
RBC # BLD: 4.36 M/UL — SIGNIFICANT CHANGE UP (ref 3.8–5.2)
RBC # FLD: 16.2 % — HIGH (ref 10.3–14.5)
SODIUM SERPL-SCNC: 138 MMOL/L — SIGNIFICANT CHANGE UP (ref 135–145)
WBC # BLD: 12.5 K/UL — HIGH (ref 3.8–10.5)
WBC # FLD AUTO: 12.5 K/UL — HIGH (ref 3.8–10.5)

## 2021-01-30 PROCEDURE — 99233 SBSQ HOSP IP/OBS HIGH 50: CPT | Mod: GC

## 2021-01-30 RX ADMIN — Medication 50 MICROGRAM(S): at 05:23

## 2021-01-30 RX ADMIN — HEPARIN SODIUM 5000 UNIT(S): 5000 INJECTION INTRAVENOUS; SUBCUTANEOUS at 12:02

## 2021-01-30 RX ADMIN — CLOPIDOGREL BISULFATE 75 MILLIGRAM(S): 75 TABLET, FILM COATED ORAL at 11:02

## 2021-01-30 RX ADMIN — Medication 1 APPLICATION(S): at 05:23

## 2021-01-30 RX ADMIN — Medication 16 UNIT(S): at 16:50

## 2021-01-30 RX ADMIN — Medication 4: at 12:03

## 2021-01-30 RX ADMIN — Medication 2: at 16:50

## 2021-01-30 RX ADMIN — Medication 16 UNIT(S): at 12:04

## 2021-01-30 RX ADMIN — CYCLOBENZAPRINE HYDROCHLORIDE 5 MILLIGRAM(S): 10 TABLET, FILM COATED ORAL at 11:03

## 2021-01-30 RX ADMIN — BUMETANIDE 1 MILLIGRAM(S): 0.25 INJECTION INTRAMUSCULAR; INTRAVENOUS at 16:50

## 2021-01-30 RX ADMIN — Medication 37.5 MILLIGRAM(S): at 20:46

## 2021-01-30 RX ADMIN — CYCLOBENZAPRINE HYDROCHLORIDE 5 MILLIGRAM(S): 10 TABLET, FILM COATED ORAL at 20:46

## 2021-01-30 RX ADMIN — Medication 37.5 MILLIGRAM(S): at 12:05

## 2021-01-30 RX ADMIN — HEPARIN SODIUM 5000 UNIT(S): 5000 INJECTION INTRAVENOUS; SUBCUTANEOUS at 20:46

## 2021-01-30 RX ADMIN — BUMETANIDE 2 MILLIGRAM(S): 0.25 INJECTION INTRAMUSCULAR; INTRAVENOUS at 05:23

## 2021-01-30 RX ADMIN — INSULIN GLARGINE 50 UNIT(S): 100 INJECTION, SOLUTION SUBCUTANEOUS at 21:35

## 2021-01-30 RX ADMIN — INSULIN GLARGINE 25 UNIT(S): 100 INJECTION, SOLUTION SUBCUTANEOUS at 07:57

## 2021-01-30 RX ADMIN — Medication 81 MILLIGRAM(S): at 11:02

## 2021-01-30 RX ADMIN — Medication 100 MILLIGRAM(S): at 05:23

## 2021-01-30 RX ADMIN — CYCLOBENZAPRINE HYDROCHLORIDE 5 MILLIGRAM(S): 10 TABLET, FILM COATED ORAL at 05:23

## 2021-01-30 RX ADMIN — Medication 1 TABLET(S): at 11:02

## 2021-01-30 RX ADMIN — Medication 100 MILLIGRAM(S): at 16:50

## 2021-01-30 RX ADMIN — Medication 16 UNIT(S): at 07:59

## 2021-01-30 RX ADMIN — Medication 3 MILLIGRAM(S): at 20:46

## 2021-01-30 RX ADMIN — Medication 37.5 MILLIGRAM(S): at 05:22

## 2021-01-30 RX ADMIN — ATORVASTATIN CALCIUM 80 MILLIGRAM(S): 80 TABLET, FILM COATED ORAL at 20:46

## 2021-01-30 RX ADMIN — SENNA PLUS 2 TABLET(S): 8.6 TABLET ORAL at 20:46

## 2021-01-30 RX ADMIN — HEPARIN SODIUM 5000 UNIT(S): 5000 INJECTION INTRAVENOUS; SUBCUTANEOUS at 05:22

## 2021-01-30 RX ADMIN — Medication 1 TABLET(S): at 20:46

## 2021-01-30 RX ADMIN — CALAMINE AND ZINC OXIDE AND PHENOL 1 APPLICATION(S): 160; 10 LOTION TOPICAL at 05:23

## 2021-01-30 NOTE — PROGRESS NOTE ADULT - PROBLEM SELECTOR PLAN 1
- 2/2 CHF exacerbation.  EF 20% w/ mitral stenosis.  CXR on admission showed bilateral predominantly lower lobe hazy opacities which is likely 2/2 fluid.  Not presenting w/ clinical signs of PNA and CT chest showed no focal consolidations.  Monitored off abx.  Blood cx also negative  - pt was being treated w/ IV diuresis which was stopped but restarted after RHC 1/20 showed elevated filling pressures. Milrinone drip and Bumex restarted (bladder scan showed evidence of retention (120cc) @ that time).    - Pt now s/p Bumex drip and milrinone drip - ended 1/26  - pt w/ persistent dyspnea however CT chest 1/25 shows clear lungs  - started PRN xanax for possible anxiety component - 2/2 CHF exacerbation.  EF 20% w/ mitral stenosis.  CXR on admission showed bilateral predominantly lower lobe hazy opacities which is likely 2/2 fluid.  Not presenting w/ clinical signs of PNA and CT chest showed no focal consolidations.  Monitored off abx.  Blood cx also negative  - pt was being treated w/ IV diuresis which was stopped but restarted after RHC 1/20 showed elevated filling pressures. Milrinone drip and Bumex restarted (bladder scan showed evidence of retention (120cc) @ that time).    - Pt now s/p Bumex drip and milrinone drip - ended 1/26  - pt w/ persistent dyspnea however CT chest 1/25 shows clear lungs  - started PRN xanax for possible anxiety component  - c/w management of chronic HFrEF

## 2021-01-30 NOTE — PROGRESS NOTE ADULT - PROBLEM SELECTOR PLAN 6
-Takes 60 units of lantus at bedtime and 18 units of humalog   - increased Lantus and Admelog based on sliding scale.  Will continue to titrate to achieve BS goal 140-180  - Endo consulted.  Started BID Lantus dosing 1/21  - lantus reduced to 28U in AM and admelog reduced to 15u premeal 2/2 FS of 80s in AM. -Takes 60 units of lantus at bedtime and 18 units of humalog   - increased Lantus and Admelog based on sliding scale.  Will continue to titrate to achieve BS goal 140-180  - Endo consulted.  Started BID Lantus dosing 1/21  - pt to f/u w/ endo in 4 weeks post d/c w/ Dr. Jillian Banks

## 2021-01-30 NOTE — PROGRESS NOTE ADULT - PROBLEM SELECTOR PLAN 2
-TTE 1/13 showed EF of 20% with global LV and RV dysfunction w/ MS.   Repeat TTE 1/19 showed severely decreased LV fx and grossly decreased RV fx w/ EF 25-30%  - Heart failure and cardiology following  - see above for history.    - s/p milrinone drip  - s/p bumex drip.  Started back on home regimen on 1/26  - per HF recs, will increase toprol XL to 100mg in AM and 50mg in PM -TTE 1/13 showed EF of 20% with global LV and RV dysfunction w/ MS.   Repeat TTE 1/19 showed severely decreased LV fx and grossly decreased RV fx w/ EF 25-30%  - Heart failure and cardiology following  - see above for history.    - s/p milrinone drip  - s/p bumex drip.  Started back on PO regimen 1/26  - per HF recs:  c/w toprol xl 100 BID, hydralazine 37.5 TID, 2mg PO bumex in AM, 1mg PO bumex in PM.  Monitor over the weekend.

## 2021-01-30 NOTE — PROGRESS NOTE ADULT - ASSESSMENT
75y.o F Azeri speaking h/o HTN, HLD, DM, CAD s/p CABG 2014 and prior PCI, severe mitral regurgitation (s/p mitral clip 2019), pulmonary HTN (TTE 2019), HF (EF 21 % June 2019), CKD IV not on dialysis (b/l SCr 2-2.2), hypothyroidism admitted for hypoxic respiratory failure 2/2 acute on chronic HFrEF exacerbation in the setting of mitral stenosis c/b MERVIN on CKD.  Pt initially treated w/ IV diuresis but was started on milrinone drip after RHC showed persistent elevated filled pressures. Now s/p  IV diuresis, milrinone dosage decreased but is w/ continued dyspnea despite clinical euvolemic status.  PRN xanax started for possible anxiety component.  Labs w/ intermittent elevations in WBC in the setting of asymptomatic bacteruria - continuing to monitor off abx.

## 2021-01-30 NOTE — PROGRESS NOTE ADULT - SUBJECTIVE AND OBJECTIVE BOX
Chief complaint  Patient is a 75y old  Female who presents with a chief complaint of increased work of breathing (30 Jan 2021 10:49)   Review of systems  Patient in bed, appears comfortable.    Labs and Fingersticks  CAPILLARY BLOOD GLUCOSE      POCT Blood Glucose.: 228 mg/dL (30 Jan 2021 11:32)  POCT Blood Glucose.: 136 mg/dL (30 Jan 2021 07:11)  POCT Blood Glucose.: 262 mg/dL (29 Jan 2021 21:14)  POCT Blood Glucose.: 203 mg/dL (29 Jan 2021 18:02)  POCT Blood Glucose.: 200 mg/dL (29 Jan 2021 16:44)      Anion Gap, Serum: 17 (01-30 @ 08:53)  Anion Gap, Serum: 17 (01-29 @ 07:10)      Calcium, Total Serum: 9.6 (01-30 @ 08:53)  Calcium, Total Serum: 9.9 (01-29 @ 07:10)          01-30    138  |  99  |  101<H>  ----------------------------<  251<H>  4.4   |  22  |  1.97<H>    Ca    9.6      30 Jan 2021 08:53  Phos  4.2     01-30  Mg     2.6     01-30                          11.4   12.50 )-----------( 537      ( 30 Jan 2021 08:53 )             36.8     Medications  MEDICATIONS  (STANDING):  amoxicillin  500 milliGRAM(s)/clavulanate 1 Tablet(s) Oral two times a day  aspirin enteric coated 81 milliGRAM(s) Oral daily  atorvastatin 80 milliGRAM(s) Oral at bedtime  buMETAnide 2 milliGRAM(s) Oral <User Schedule>  buMETAnide 1 milliGRAM(s) Oral <User Schedule>  clopidogrel Tablet 75 milliGRAM(s) Oral daily  dextrose 40% Gel 15 Gram(s) Oral once  dextrose 5%. 1000 milliLiter(s) (50 mL/Hr) IV Continuous <Continuous>  dextrose 5%. 1000 milliLiter(s) (100 mL/Hr) IV Continuous <Continuous>  dextrose 50% Injectable 25 Gram(s) IV Push once  dextrose 50% Injectable 12.5 Gram(s) IV Push once  dextrose 50% Injectable 25 Gram(s) IV Push once  glucagon  Injectable 1 milliGRAM(s) IntraMuscular once  heparin   Injectable 5000 Unit(s) SubCutaneous every 8 hours  hydrALAZINE 37.5 milliGRAM(s) Oral every 8 hours  insulin glargine Injectable (LANTUS) 50 Unit(s) SubCutaneous at bedtime  insulin glargine Injectable (LANTUS) 25 Unit(s) SubCutaneous every morning  insulin lispro (ADMELOG) corrective regimen sliding scale   SubCutaneous three times a day before meals  insulin lispro (ADMELOG) corrective regimen sliding scale   SubCutaneous at bedtime  insulin lispro Injectable (ADMELOG) 16 Unit(s) SubCutaneous three times a day before meals  levothyroxine 50 MICROGram(s) Oral daily  melatonin 3 milliGRAM(s) Oral at bedtime  metoprolol succinate  milliGRAM(s) Oral every 12 hours  senna 2 Tablet(s) Oral at bedtime      Physical Exam  General: Patient comfortable in bed  Vital Signs Last 12 Hrs  T(F): 98.7 (01-30-21 @ 11:17), Max: 98.7 (01-30-21 @ 11:17)  HR: 83 (01-30-21 @ 11:17) (83 - 91)  BP: 116/57 (01-30-21 @ 11:17) (100/61 - 121/75)  BP(mean): --  RR: 18 (01-30-21 @ 11:17) (18 - 18)  SpO2: 98% (01-30-21 @ 11:17) (98% - 99%)  Neck: No palpable thyroid nodules.  CVS: S1S2, No murmurs  Respiratory: No wheezing, no crepitations  GI: Abdomen soft, bowel sounds positive  Musculoskeletal:  edema lower extremities.     Diagnostics

## 2021-01-30 NOTE — PROGRESS NOTE ADULT - SUBJECTIVE AND OBJECTIVE BOX
pt seen and examined, no complaints, ROS - .     Review of Systems:   Constitutional: [ ] fevers, [ ] chills.   Skin: [ ] dry skin. [ ] rashes.  Psychiatric: [ ] depression, [ ] anxiety.   Gastrointestinal: [ ] BRBPR, [ ] melena.   Neurological: [ ] confusion. [ ] seizures. [ ] shuffling gait.   Ears,Nose,Mouth and Throat: [ ] ear pain [ ] sore throat.   Eyes: [ ] diplopia.   Respiratory: [ ] hemoptysis. [ ] shortness of breath  Cardiovascular: See HPI above  Hematologic/Lymphatic: [ ] anemia. [ ] painful nodes. [ ] prolonged bleeding.   Genitourinary: [ ] hematuria. [ ] flank pain.   Endocrine: [ ] significant change in weight. [ ] intolerance to heat and cold.     Review of systems [ x] otherwise negative, [ ] otherwise unable to obtain    FH: no family history of sudden cardiac death in first degree relatives    SH: [ ] tobacco, [ ] alcohol, [ ] drugs            ALPRAZolam 0.25 milliGRAM(s) Oral every 8 hours PRN  amoxicillin  500 milliGRAM(s)/clavulanate 1 Tablet(s) Oral two times a day  aspirin enteric coated 81 milliGRAM(s) Oral daily  atorvastatin 80 milliGRAM(s) Oral at bedtime  buMETAnide 2 milliGRAM(s) Oral <User Schedule>  buMETAnide 1 milliGRAM(s) Oral <User Schedule>  calamine/zinc oxide Lotion 1 Application(s) Topical three times a day  clobetasol 0.05% Cream 1 Application(s) Topical every 12 hours  clopidogrel Tablet 75 milliGRAM(s) Oral daily  cyclobenzaprine 5 milliGRAM(s) Oral three times a day PRN  dextrose 40% Gel 15 Gram(s) Oral once  dextrose 5%. 1000 milliLiter(s) IV Continuous <Continuous>  dextrose 5%. 1000 milliLiter(s) IV Continuous <Continuous>  dextrose 50% Injectable 25 Gram(s) IV Push once  dextrose 50% Injectable 12.5 Gram(s) IV Push once  dextrose 50% Injectable 25 Gram(s) IV Push once  glucagon  Injectable 1 milliGRAM(s) IntraMuscular once  heparin   Injectable 5000 Unit(s) SubCutaneous every 8 hours  hydrALAZINE 37.5 milliGRAM(s) Oral every 8 hours  insulin glargine Injectable (LANTUS) 50 Unit(s) SubCutaneous at bedtime  insulin glargine Injectable (LANTUS) 25 Unit(s) SubCutaneous every morning  insulin lispro (ADMELOG) corrective regimen sliding scale   SubCutaneous three times a day before meals  insulin lispro (ADMELOG) corrective regimen sliding scale   SubCutaneous at bedtime  insulin lispro Injectable (ADMELOG) 16 Unit(s) SubCutaneous three times a day before meals  levothyroxine 50 MICROGram(s) Oral daily  melatonin 3 milliGRAM(s) Oral at bedtime  metoprolol succinate  milliGRAM(s) Oral every 12 hours  polyethylene glycol 3350 17 Gram(s) Oral two times a day PRN  senna 2 Tablet(s) Oral at bedtime                            10.5   11.36 )-----------( 553      ( 29 Jan 2021 07:05 )             33.8       Hemoglobin: 10.5 g/dL (01-29 @ 07:05)  Hemoglobin: 10.7 g/dL (01-28 @ 06:30)      01-29    134<L>  |  97  |  108<H>  ----------------------------<  188<H>  3.8   |  20<L>  |  2.04<H>    Ca    9.9      29 Jan 2021 07:10  Phos  5.1     01-29  Mg     2.7     01-29      Creatinine Trend: 2.04<--, 2.13<--, 2.14<--, 2.19<--, 2.41<--, 2.32<--    COAGS:           T(C): 36.8 (01-30-21 @ 04:00), Max: 37.3 (01-29-21 @ 20:31)  HR: 91 (01-30-21 @ 05:21) (81 - 91)  BP: 121/75 (01-30-21 @ 05:21) (100/61 - 121/75)  RR: 18 (01-30-21 @ 04:00) (18 - 18)  SpO2: 99% (01-30-21 @ 04:00) (97% - 99%)  Wt(kg): --    I&O's Summary    29 Jan 2021 07:01  -  30 Jan 2021 07:00  --------------------------------------------------------  IN: 600 mL / OUT: 850 mL / NET: -250 mL      General: Well nourished in no acute distress. Alert and Oriented * 3.   Head: Normocephalic and atraumatic.   Neck: No JVD. No bruits. Supple. Does not appear to be enlarged.   Cardiovascular: + S1,S2 ; RRR Soft systolic murmur at the left lower sternal border. No rubs noted.    Lungs: CTA b/l. No rhonchi, rales or wheezes.   Abdomen: + BS, soft. Non tender. Non distended. No rebound. No guarding.   Extremities: No clubbing/cyanosis/edema.   Neurologic: Moves all four extremities. Full range of motion.   Skin: Warm and moist. The patient's skin has normal elasticity and good skin turgor.   Psychiatric: Appropriate mood and affect.  Musculoskeletal: Normal range of motion, normal strength    TELEMETRY: SR 70s    < from: Cardiac Cath Lab - Adult (01.20.21 @ 10:22) >  PROCEDURE:  --  Right heart catheterization.  --  Sonosite - Diagnostic.  COMPLICATIONS: There were no complications.  DIAGNOSTIC RECOMMENDATIONS: The patient should continuewith the present  medications.  Prepared and signed by  Cesar Jackson M.D.       A/P) 74 y/o female PMH HTN, HLD, DM, CAD s/p CABG 2014 and prior PCI, severe mitral regurgitation (s/p mitral clip 2019), pulmonary HTN, HF (EF 21 % June 2019), CKD IV not on dialysis (b/l SCr 2-2.2), hypothyroidism a/w acute on chronic systolic CHF with NYHA IV functional status.    -CT C spine noted with no fracture, degenerative changes  -repeat echo noted but didn't reassess degree of mitral stenosis  -Continue dapt/statin   -RHC noted above - Filling pressures are appropriate considering LV function and the mitral valve gradient  -Volume status improved - continue PO Bumex  -Continue Toprol XL  -Tolerating hydralazine  -very poor candidate for a primary prevention ICD  -no further inpatient cardiac w/u planned  -D/C planning per primary team  -f/u with Dr. Sotelo on Friday 2/5 at 2:20 PM

## 2021-01-30 NOTE — PROGRESS NOTE ADULT - ATTENDING COMMENTS
PAtient seen and examined     VSS. Appearing more euvolemic today. Still saturating well on RA, occasional tachypnea which is improved. Plan to monitor over weekend and setup dc planning to rehab pending heart failure status on monday. PAtient seen and examined     VSS. Appearing more euvolemic today. Still saturating well on RA, occasional tachypnea which is improved. Plan to monitor over weekend and setup dc planning to rehab pending heart failure status on monday. FSG otherwise overall acceptable- will watch sugars closely.

## 2021-01-30 NOTE — PROGRESS NOTE ADULT - ASSESSMENT
Assessment  DMT2: 75y Female with DM T2 with hyperglycemia, A1C 7.1%, was on insulin at home, now on basal bolus insulin, blood sugars trending down, no hypoglycemic episodes, eating inconsistent meals.  HF: on medications, stable, monitored, FU Cardiology.  Hypothyroidism: On Synthroid 50 mcg po daily, compliant with Synthroid intake, asymptomatic, euthyroid.  CKD: Monitor labs/BMP      Jillian Banks MD  Cell: [phone]  Office: [phone]

## 2021-01-30 NOTE — PROGRESS NOTE ADULT - SUBJECTIVE AND OBJECTIVE BOX
Jacquie Amos MD  Internal Medicine, PGY-3  [phone]    Patient is a 75y old  Female who presents with a chief complaint of increased work of breathing (29 Jan 2021 16:16)      SUBJECTIVE / OVERNIGHT EVENTS:  no acute events overnight.  Flexeril started 1/29 for neck spasm     ADDITIONAL ROS:    MEDICATIONS  (STANDING):  amoxicillin  875 milliGRAM(s)/clavulanate 1 Tablet(s) Oral daily  aspirin enteric coated 81 milliGRAM(s) Oral daily  atorvastatin 80 milliGRAM(s) Oral at bedtime  buMETAnide 2 milliGRAM(s) Oral <User Schedule>  buMETAnide 1 milliGRAM(s) Oral <User Schedule>  calamine/zinc oxide Lotion 1 Application(s) Topical three times a day  clobetasol 0.05% Cream 1 Application(s) Topical every 12 hours  clopidogrel Tablet 75 milliGRAM(s) Oral daily  dextrose 40% Gel 15 Gram(s) Oral once  dextrose 5%. 1000 milliLiter(s) (50 mL/Hr) IV Continuous <Continuous>  dextrose 5%. 1000 milliLiter(s) (100 mL/Hr) IV Continuous <Continuous>  dextrose 50% Injectable 25 Gram(s) IV Push once  dextrose 50% Injectable 12.5 Gram(s) IV Push once  dextrose 50% Injectable 25 Gram(s) IV Push once  glucagon  Injectable 1 milliGRAM(s) IntraMuscular once  heparin   Injectable 5000 Unit(s) SubCutaneous every 8 hours  hydrALAZINE 37.5 milliGRAM(s) Oral every 8 hours  insulin glargine Injectable (LANTUS) 50 Unit(s) SubCutaneous at bedtime  insulin glargine Injectable (LANTUS) 25 Unit(s) SubCutaneous every morning  insulin lispro (ADMELOG) corrective regimen sliding scale   SubCutaneous three times a day before meals  insulin lispro (ADMELOG) corrective regimen sliding scale   SubCutaneous at bedtime  insulin lispro Injectable (ADMELOG) 16 Unit(s) SubCutaneous three times a day before meals  levothyroxine 50 MICROGram(s) Oral daily  melatonin 3 milliGRAM(s) Oral at bedtime  metoprolol succinate  milliGRAM(s) Oral every 12 hours  senna 2 Tablet(s) Oral at bedtime    MEDICATIONS  (PRN):  ALPRAZolam 0.25 milliGRAM(s) Oral every 8 hours PRN anxiety  cyclobenzaprine 5 milliGRAM(s) Oral three times a day PRN Muscle Spasm  polyethylene glycol 3350 17 Gram(s) Oral two times a day PRN Constipation      CAPILLARY BLOOD GLUCOSE      POCT Blood Glucose.: 136 mg/dL (30 Jan 2021 07:11)  POCT Blood Glucose.: 262 mg/dL (29 Jan 2021 21:14)  POCT Blood Glucose.: 203 mg/dL (29 Jan 2021 18:02)  POCT Blood Glucose.: 200 mg/dL (29 Jan 2021 16:44)  POCT Blood Glucose.: 271 mg/dL (29 Jan 2021 11:55)    I&O's Summary    29 Jan 2021 07:01  -  30 Jan 2021 07:00  --------------------------------------------------------  IN: 600 mL / OUT: 850 mL / NET: -250 mL        PHYSICAL EXAM:  Vital Signs Last 24 Hrs  T(C): 36.8 (30 Jan 2021 04:00), Max: 37.3 (29 Jan 2021 20:31)  T(F): 98.2 (30 Jan 2021 04:00), Max: 99.2 (29 Jan 2021 20:31)  HR: 91 (30 Jan 2021 05:21) (81 - 91)  BP: 121/75 (30 Jan 2021 05:21) (100/61 - 121/75)  BP(mean): --  RR: 18 (30 Jan 2021 04:00) (18 - 18)  SpO2: 99% (30 Jan 2021 04:00) (97% - 99%)    PHYSICAL EXAM:  GENERAL: NAD, cachectic, deconditioned  HEAD:  Atraumatic, Normocephalic  EYES: EOMI, PERRLA, conjunctiva and sclera clear  CHEST/LUNG: no wheeze, trace bibasilar crackles   HEART: Regular rate and rhythm; No murmurs, rubs, or gallops  ABDOMEN: Soft, Nontender, Nondistended; Bowel sounds present  EXTREMITIES:  2+ Peripheral Pulses, No clubbing, cyanosis, or edema  NERVOUS SYSTEM:  Alert & Oriented X3, Good concentration; no focal deficits       LABS:                        10.5   11.36 )-----------( 553      ( 29 Jan 2021 07:05 )             33.8     01-29    134<L>  |  97  |  108<H>  ----------------------------<  188<H>  3.8   |  20<L>  |  2.04<H>    Ca    9.9      29 Jan 2021 07:10  Phos  5.1     01-29  Mg     2.7     01-29                          COVID-19 PCR: [place] (01-28-21 @ 09:49)         Jacquie Amos MD  Internal Medicine, PGY-3  [phone]    Patient is a 75y old  Female who presents with a chief complaint of increased work of breathing (29 Jan 2021 16:16)      SUBJECTIVE / OVERNIGHT EVENTS:  no acute events overnight.  Flexeril started 1/29 for neck spasm     ADDITIONAL ROS: no f/c, no abd pain, no n/v/d/c, no dysuria     MEDICATIONS  (STANDING):  amoxicillin  875 milliGRAM(s)/clavulanate 1 Tablet(s) Oral daily  aspirin enteric coated 81 milliGRAM(s) Oral daily  atorvastatin 80 milliGRAM(s) Oral at bedtime  buMETAnide 2 milliGRAM(s) Oral <User Schedule>  buMETAnide 1 milliGRAM(s) Oral <User Schedule>  calamine/zinc oxide Lotion 1 Application(s) Topical three times a day  clobetasol 0.05% Cream 1 Application(s) Topical every 12 hours  clopidogrel Tablet 75 milliGRAM(s) Oral daily  dextrose 40% Gel 15 Gram(s) Oral once  dextrose 5%. 1000 milliLiter(s) (50 mL/Hr) IV Continuous <Continuous>  dextrose 5%. 1000 milliLiter(s) (100 mL/Hr) IV Continuous <Continuous>  dextrose 50% Injectable 25 Gram(s) IV Push once  dextrose 50% Injectable 12.5 Gram(s) IV Push once  dextrose 50% Injectable 25 Gram(s) IV Push once  glucagon  Injectable 1 milliGRAM(s) IntraMuscular once  heparin   Injectable 5000 Unit(s) SubCutaneous every 8 hours  hydrALAZINE 37.5 milliGRAM(s) Oral every 8 hours  insulin glargine Injectable (LANTUS) 50 Unit(s) SubCutaneous at bedtime  insulin glargine Injectable (LANTUS) 25 Unit(s) SubCutaneous every morning  insulin lispro (ADMELOG) corrective regimen sliding scale   SubCutaneous three times a day before meals  insulin lispro (ADMELOG) corrective regimen sliding scale   SubCutaneous at bedtime  insulin lispro Injectable (ADMELOG) 16 Unit(s) SubCutaneous three times a day before meals  levothyroxine 50 MICROGram(s) Oral daily  melatonin 3 milliGRAM(s) Oral at bedtime  metoprolol succinate  milliGRAM(s) Oral every 12 hours  senna 2 Tablet(s) Oral at bedtime    MEDICATIONS  (PRN):  ALPRAZolam 0.25 milliGRAM(s) Oral every 8 hours PRN anxiety  cyclobenzaprine 5 milliGRAM(s) Oral three times a day PRN Muscle Spasm  polyethylene glycol 3350 17 Gram(s) Oral two times a day PRN Constipation      CAPILLARY BLOOD GLUCOSE      POCT Blood Glucose.: 136 mg/dL (30 Jan 2021 07:11)  POCT Blood Glucose.: 262 mg/dL (29 Jan 2021 21:14)  POCT Blood Glucose.: 203 mg/dL (29 Jan 2021 18:02)  POCT Blood Glucose.: 200 mg/dL (29 Jan 2021 16:44)  POCT Blood Glucose.: 271 mg/dL (29 Jan 2021 11:55)    I&O's Summary    29 Jan 2021 07:01  -  30 Jan 2021 07:00  --------------------------------------------------------  IN: 600 mL / OUT: 850 mL / NET: -250 mL        PHYSICAL EXAM:  Vital Signs Last 24 Hrs  T(C): 36.8 (30 Jan 2021 04:00), Max: 37.3 (29 Jan 2021 20:31)  T(F): 98.2 (30 Jan 2021 04:00), Max: 99.2 (29 Jan 2021 20:31)  HR: 91 (30 Jan 2021 05:21) (81 - 91)  BP: 121/75 (30 Jan 2021 05:21) (100/61 - 121/75)  BP(mean): --  RR: 18 (30 Jan 2021 04:00) (18 - 18)  SpO2: 99% (30 Jan 2021 04:00) (97% - 99%)    PHYSICAL EXAM:  GENERAL: NAD, cachectic, deconditioned  HEAD:  Atraumatic, Normocephalic  EYES: EOMI, PERRLA, conjunctiva and sclera clear  CHEST/LUNG: no wheeze, trace bibasilar crackles   HEART: Regular rate and rhythm; No murmurs, rubs, or gallops  ABDOMEN: Soft, Nontender, Nondistended; Bowel sounds present  EXTREMITIES:  2+ Peripheral Pulses, No clubbing, cyanosis, or edema  NERVOUS SYSTEM:  Alert & Oriented X3, Good concentration; no focal deficits       LABS:                        10.5   11.36 )-----------( 553      ( 29 Jan 2021 07:05 )             33.8     01-29    134<L>  |  97  |  108<H>  ----------------------------<  188<H>  3.8   |  20<L>  |  2.04<H>    Ca    9.9      29 Jan 2021 07:10  Phos  5.1     01-29  Mg     2.7     01-29                          COVID-19 PCR: [place] (01-28-21 @ 09:49)

## 2021-01-30 NOTE — PROGRESS NOTE ADULT - PROBLEM SELECTOR PLAN 4
Patient with persistent neck pain  Pain increased with movement, and with tenderness to palpation  Most likely MSK in nature  - pain control with tylenol ATC and lidocaine patch  - will obtain CT cervical spine to r/o fracture/herniation Patient with persistent neck pain  Pain increased with movement, and with tenderness to palpation  Most likely MSK in nature  - pain control with tylenol ATC and lidocaine patch  - CT cervical spine unremarkable for pathology  - c/w flexeril PRN

## 2021-01-30 NOTE — PROGRESS NOTE ADULT - ASSESSMENT
74 yo F w/ PMH of CKD stage 4 (baseline Cr 1.9-2.2) HTN, T2DM, CAD s/p CABG X3 (2014 at [facility]), non-dilated ICM (EF 20-25%), severe mitral regurgitation s/p mitral clip (6/13/19), severe pulm HTN, hypothyroidism who presented for evaluation of SOB and was admitted for ADHF.    CKD stage 4  - baseline Cr 1.9-2.2; has had recurrent MERVIN's over the years  - CKD is likely 2/2 recurrent MERVIN's + underlying DM/HTN  - Scr stable today. BUN elevated due to diuretics.   - Pt on oral diuretics. Diuretics per heart failure team   - Avoid hypotension   - renal sonogram in the past with simple renal cyst  - d/w daughter and son-in-law on 01/23 about worsening renal function and if no improvement likely need for dialysis. They were also informed that she will be a poor candidate for HD in view of CHF. They understand but wants HD if needed. Consent for HD is in chart.  - She is not uremic, monitor at present  - Avoid nephrotoxins including NSAIDs, IV contrast (if possible), [employer] products  - monitor I/O's accurately    HTN  BP controlled  Low salt diet   MOnitor BP    Proteinuria/Hematuria  - likely from underlying DM  - has 1.8 grams proteinuria  - Hep B, Hep C, HIV RF, C3, C4, p-ANCA, c-ANCA, RPR neg  - weak gamma-migrating protein, weak IgG lambda protein band noted in the past. Pt to follow w/ heme   - DEAN 1:320 positive  - RPR neg, FTA +

## 2021-01-30 NOTE — PROGRESS NOTE ADULT - SUBJECTIVE AND OBJECTIVE BOX
[facility]   MD Dion Dasilva MD, D.O. Ruoru Wong, PA    From 7 AM - 5 PM:  OFFICE: [phone]  Dr. Muhammad cell: [phone]  Dr. Montero cell: [phone]  Dr. Soria cell: [phone]  RASHIDA Smith cell: [phone]    From 5 PM - 7 AM: Answering Service: [phone]  Date of service: 01-30-21 @ 10:50    RENAL FOLLOW UP NOTE  --------------------------------------------------------------------------------  HPI:  Pt seen and examined at bedside.       PAST HISTORY  --------------------------------------------------------------------------------  No significant changes to PMH, PSH, FHx, SHx, unless otherwise noted    ALLERGIES & MEDICATIONS  --------------------------------------------------------------------------------  Allergies    azithromycin (Hives; Pruritus)    Intolerances      Standing Inpatient Medications  amoxicillin  500 milliGRAM(s)/clavulanate 1 Tablet(s) Oral two times a day  aspirin enteric coated 81 milliGRAM(s) Oral daily  atorvastatin 80 milliGRAM(s) Oral at bedtime  buMETAnide 2 milliGRAM(s) Oral <User Schedule>  buMETAnide 1 milliGRAM(s) Oral <User Schedule>  calamine/zinc oxide Lotion 1 Application(s) Topical three times a day  clobetasol 0.05% Cream 1 Application(s) Topical every 12 hours  clopidogrel Tablet 75 milliGRAM(s) Oral daily  dextrose 40% Gel 15 Gram(s) Oral once  dextrose 5%. 1000 milliLiter(s) IV Continuous <Continuous>  dextrose 5%. 1000 milliLiter(s) IV Continuous <Continuous>  dextrose 50% Injectable 25 Gram(s) IV Push once  dextrose 50% Injectable 12.5 Gram(s) IV Push once  dextrose 50% Injectable 25 Gram(s) IV Push once  glucagon  Injectable 1 milliGRAM(s) IntraMuscular once  heparin   Injectable 5000 Unit(s) SubCutaneous every 8 hours  hydrALAZINE 37.5 milliGRAM(s) Oral every 8 hours  insulin glargine Injectable (LANTUS) 50 Unit(s) SubCutaneous at bedtime  insulin glargine Injectable (LANTUS) 25 Unit(s) SubCutaneous every morning  insulin lispro (ADMELOG) corrective regimen sliding scale   SubCutaneous three times a day before meals  insulin lispro (ADMELOG) corrective regimen sliding scale   SubCutaneous at bedtime  insulin lispro Injectable (ADMELOG) 16 Unit(s) SubCutaneous three times a day before meals  levothyroxine 50 MICROGram(s) Oral daily  melatonin 3 milliGRAM(s) Oral at bedtime  metoprolol succinate  milliGRAM(s) Oral every 12 hours  senna 2 Tablet(s) Oral at bedtime    PRN Inpatient Medications  ALPRAZolam 0.25 milliGRAM(s) Oral every 8 hours PRN  cyclobenzaprine 5 milliGRAM(s) Oral three times a day PRN  polyethylene glycol 3350 17 Gram(s) Oral two times a day PRN      REVIEW OF SYSTEMS  --------------------------------------------------------------------------------  General: no fever  CVS: no chest pain  RESP: no sob, no cough  ABD: no abdominal pain  : no dysuria,  MSK: no edema     VITALS/PHYSICAL EXAM  --------------------------------------------------------------------------------  T(C): 36.8 (01-30-21 @ 04:00), Max: 37.3 (01-29-21 @ 20:31)  HR: 91 (01-30-21 @ 05:21) (81 - 91)  BP: 121/75 (01-30-21 @ 05:21) (100/61 - 121/75)  RR: 18 (01-30-21 @ 04:00) (18 - 18)  SpO2: 99% (01-30-21 @ 04:00) (97% - 99%)  Wt(kg): --        01-29-21 @ 07:01  -  01-30-21 @ 07:00  --------------------------------------------------------  IN: 600 mL / OUT: 850 mL / NET: -250 mL    01-30-21 @ 07:01  -  01-30-21 @ 10:50  --------------------------------------------------------  IN: 320 mL / OUT: 350 mL / NET: -30 mL      Physical Exam:  	Gen: NAD  	HEENT: MMM  	Pulm: CTA B/L  	CV: S1S2  	Abd: Soft, +BS  	Ext: No LE edema B/L                      Neuro: Awake   	Skin: Warm and Dry   	  LABS/STUDIES  --------------------------------------------------------------------------------              11.4   12.50 >-----------<  537      [01-30-21 @ 08:53]              36.8     138  |  99  |  101  ----------------------------<  251      [01-30-21 @ 08:53]  4.4   |  22  |  1.97        Ca     9.6     [01-30-21 @ 08:53]      Mg     2.6     [01-30-21 @ 08:53]      Phos  4.2     [01-30-21 @ 08:53]    Creatinine Trend:  SCr 1.97 [01-30 @ 08:53]  SCr 2.04 [01-29 @ 07:10]  SCr 2.13 [01-28 @ 07:47]  SCr 2.14 [01-27 @ 06:31]  SCr 2.19 [01-26 @ 07:14]    Urinalysis - [01-22-21 @ 22:32]      Color Light Yellow / Appearance Clear / SG 1.011 / pH 5.5      Gluc Trace / Ketone Negative  / Bili Negative / Urobili Negative       Blood Negative / Protein 30 mg/dL / Leuk Est Large / Nitrite Negative      RBC 2 / WBC 35 / Hyaline 1 / Gran  / Sq Epi  / Non Sq Epi 2 / Bacteria Negative      Ferritin 111      [01-13-21 @ 01:42]  HbA1c 7.5      [10-08-19 @ 14:30]  TSH 2.15      [01-13-21 @ 10:07]

## 2021-01-30 NOTE — PROGRESS NOTE ADULT - PROBLEM SELECTOR PLAN 8
- continue Atorvastatin 80 mg - continue Atorvastatin 80 mg    # Asymptomatic bacteruria  - pt w/ intermittent wbc.  Denies dysruia/frequency however only source for WBC given neg cxr and cultures.  will treat w/ 3 day course of renally dosed augment for E. coli UTI

## 2021-01-31 LAB
ANION GAP SERPL CALC-SCNC: 17 MMOL/L — SIGNIFICANT CHANGE UP (ref 5–17)
BUN SERPL-MCNC: 105 MG/DL — HIGH (ref 7–23)
CALCIUM SERPL-MCNC: 9.4 MG/DL — SIGNIFICANT CHANGE UP (ref 8.4–10.5)
CHLORIDE SERPL-SCNC: 103 MMOL/L — SIGNIFICANT CHANGE UP (ref 96–108)
CO2 SERPL-SCNC: 19 MMOL/L — LOW (ref 22–31)
CREAT SERPL-MCNC: 1.98 MG/DL — HIGH (ref 0.5–1.3)
GLUCOSE BLDC GLUCOMTR-MCNC: 118 MG/DL — HIGH (ref 70–99)
GLUCOSE BLDC GLUCOMTR-MCNC: 164 MG/DL — HIGH (ref 70–99)
GLUCOSE BLDC GLUCOMTR-MCNC: 194 MG/DL — HIGH (ref 70–99)
GLUCOSE BLDC GLUCOMTR-MCNC: 250 MG/DL — HIGH (ref 70–99)
GLUCOSE SERPL-MCNC: 148 MG/DL — HIGH (ref 70–99)
HCT VFR BLD CALC: 31.5 % — LOW (ref 34.5–45)
HGB BLD-MCNC: 9.6 G/DL — LOW (ref 11.5–15.5)
MAGNESIUM SERPL-MCNC: 2.7 MG/DL — HIGH (ref 1.6–2.6)
MCHC RBC-ENTMCNC: 25.7 PG — LOW (ref 27–34)
MCHC RBC-ENTMCNC: 30.5 GM/DL — LOW (ref 32–36)
MCV RBC AUTO: 84.5 FL — SIGNIFICANT CHANGE UP (ref 80–100)
NRBC # BLD: 0 /100 WBCS — SIGNIFICANT CHANGE UP (ref 0–0)
PHOSPHATE SERPL-MCNC: 4.4 MG/DL — SIGNIFICANT CHANGE UP (ref 2.5–4.5)
PLATELET # BLD AUTO: 537 K/UL — HIGH (ref 150–400)
POTASSIUM SERPL-MCNC: 4.3 MMOL/L — SIGNIFICANT CHANGE UP (ref 3.5–5.3)
POTASSIUM SERPL-SCNC: 4.3 MMOL/L — SIGNIFICANT CHANGE UP (ref 3.5–5.3)
RBC # BLD: 3.73 M/UL — LOW (ref 3.8–5.2)
RBC # FLD: 16.2 % — HIGH (ref 10.3–14.5)
SODIUM SERPL-SCNC: 139 MMOL/L — SIGNIFICANT CHANGE UP (ref 135–145)
WBC # BLD: 12.63 K/UL — HIGH (ref 3.8–10.5)
WBC # FLD AUTO: 12.63 K/UL — HIGH (ref 3.8–10.5)

## 2021-01-31 PROCEDURE — 99233 SBSQ HOSP IP/OBS HIGH 50: CPT | Mod: GC

## 2021-01-31 RX ORDER — ALPRAZOLAM 0.25 MG
0.25 TABLET ORAL EVERY 8 HOURS
Refills: 0 | Status: DISCONTINUED | OUTPATIENT
Start: 2021-01-31 | End: 2021-02-02

## 2021-01-31 RX ADMIN — Medication 16 UNIT(S): at 07:29

## 2021-01-31 RX ADMIN — Medication 50 MICROGRAM(S): at 05:47

## 2021-01-31 RX ADMIN — Medication 37.5 MILLIGRAM(S): at 21:54

## 2021-01-31 RX ADMIN — ATORVASTATIN CALCIUM 80 MILLIGRAM(S): 80 TABLET, FILM COATED ORAL at 21:54

## 2021-01-31 RX ADMIN — Medication 100 MILLIGRAM(S): at 05:47

## 2021-01-31 RX ADMIN — CYCLOBENZAPRINE HYDROCHLORIDE 5 MILLIGRAM(S): 10 TABLET, FILM COATED ORAL at 21:54

## 2021-01-31 RX ADMIN — Medication 37.5 MILLIGRAM(S): at 05:47

## 2021-01-31 RX ADMIN — Medication 16 UNIT(S): at 11:54

## 2021-01-31 RX ADMIN — BUMETANIDE 1 MILLIGRAM(S): 0.25 INJECTION INTRAMUSCULAR; INTRAVENOUS at 16:39

## 2021-01-31 RX ADMIN — Medication 1 TABLET(S): at 16:39

## 2021-01-31 RX ADMIN — HEPARIN SODIUM 5000 UNIT(S): 5000 INJECTION INTRAVENOUS; SUBCUTANEOUS at 21:55

## 2021-01-31 RX ADMIN — Medication 3 MILLIGRAM(S): at 21:54

## 2021-01-31 RX ADMIN — INSULIN GLARGINE 25 UNIT(S): 100 INJECTION, SOLUTION SUBCUTANEOUS at 07:29

## 2021-01-31 RX ADMIN — HEPARIN SODIUM 5000 UNIT(S): 5000 INJECTION INTRAVENOUS; SUBCUTANEOUS at 05:47

## 2021-01-31 RX ADMIN — CYCLOBENZAPRINE HYDROCHLORIDE 5 MILLIGRAM(S): 10 TABLET, FILM COATED ORAL at 11:53

## 2021-01-31 RX ADMIN — Medication 2: at 07:28

## 2021-01-31 RX ADMIN — CLOPIDOGREL BISULFATE 75 MILLIGRAM(S): 75 TABLET, FILM COATED ORAL at 11:54

## 2021-01-31 RX ADMIN — Medication 0.25 MILLIGRAM(S): at 08:51

## 2021-01-31 RX ADMIN — Medication 1 TABLET(S): at 05:47

## 2021-01-31 RX ADMIN — Medication 0: at 21:53

## 2021-01-31 RX ADMIN — Medication 100 MILLIGRAM(S): at 16:39

## 2021-01-31 RX ADMIN — HEPARIN SODIUM 5000 UNIT(S): 5000 INJECTION INTRAVENOUS; SUBCUTANEOUS at 12:01

## 2021-01-31 RX ADMIN — CYCLOBENZAPRINE HYDROCHLORIDE 5 MILLIGRAM(S): 10 TABLET, FILM COATED ORAL at 05:47

## 2021-01-31 RX ADMIN — BUMETANIDE 2 MILLIGRAM(S): 0.25 INJECTION INTRAMUSCULAR; INTRAVENOUS at 05:47

## 2021-01-31 RX ADMIN — Medication 81 MILLIGRAM(S): at 11:54

## 2021-01-31 RX ADMIN — SENNA PLUS 2 TABLET(S): 8.6 TABLET ORAL at 21:54

## 2021-01-31 RX ADMIN — Medication 37.5 MILLIGRAM(S): at 12:01

## 2021-01-31 RX ADMIN — INSULIN GLARGINE 50 UNIT(S): 100 INJECTION, SOLUTION SUBCUTANEOUS at 21:53

## 2021-01-31 RX ADMIN — Medication 2: at 11:54

## 2021-01-31 NOTE — PROGRESS NOTE ADULT - PROBLEM SELECTOR PLAN 6
-Takes 60 units of lantus at bedtime and 18 units of humalog   - increased Lantus and Admelog based on sliding scale.  Will continue to titrate to achieve BS goal 140-180  - Endo consulted.  Started BID Lantus dosing 1/21  - pt to f/u w/ endo in 4 weeks post d/c w/ Dr. Jillian Banks

## 2021-01-31 NOTE — PROGRESS NOTE ADULT - ASSESSMENT
75y.o F Kyrgyz speaking h/o HTN, HLD, DM, CAD s/p CABG 2014 and prior PCI, severe mitral regurgitation (s/p mitral clip 2019), pulmonary HTN (TTE 2019), HF (EF 21 % June 2019), CKD IV not on dialysis (b/l SCr 2-2.2), hypothyroidism admitted for hypoxic respiratory failure 2/2 acute on chronic HFrEF exacerbation in the setting of mitral stenosis c/b MERVIN on CKD.  Pt initially treated w/ IV diuresis but was started on milrinone drip after RHC showed persistent elevated filled pressures. Now s/p  IV diuresis, milrinone dosage decreased but is w/ continued dyspnea despite clinical euvolemic status.  PRN xanax started for possible anxiety component.  Labs w/ intermittent elevations in WBC in the setting of asymptomatic bacteruria - continuing to monitor off abx.

## 2021-01-31 NOTE — PROGRESS NOTE ADULT - SUBJECTIVE AND OBJECTIVE BOX
Chief complaint  Patient is a 75y old  Female who presents with a chief complaint of increased work of breathing (31 Jan 2021 12:23)   Review of systems  Patient in bed, looks comfortable, no hypoglycemic episodes.    Labs and Fingersticks  CAPILLARY BLOOD GLUCOSE      POCT Blood Glucose.: 194 mg/dL (31 Jan 2021 11:36)  POCT Blood Glucose.: 164 mg/dL (31 Jan 2021 07:12)  POCT Blood Glucose.: 199 mg/dL (30 Jan 2021 20:57)  POCT Blood Glucose.: 153 mg/dL (30 Jan 2021 16:29)      Anion Gap, Serum: 17 (01-31 @ 07:29)  Anion Gap, Serum: 17 (01-30 @ 08:53)      Calcium, Total Serum: 9.4 (01-31 @ 07:29)  Calcium, Total Serum: 9.6 (01-30 @ 08:53)          01-31    139  |  103  |  105<H>  ----------------------------<  148<H>  4.3   |  19<L>  |  1.98<H>    Ca    9.4      31 Jan 2021 07:29  Phos  4.4     01-31  Mg     2.7     01-31                          9.6    12.63 )-----------( 537      ( 31 Jan 2021 07:29 )             31.5     Medications  MEDICATIONS  (STANDING):  amoxicillin  500 milliGRAM(s)/clavulanate 1 Tablet(s) Oral two times a day  aspirin enteric coated 81 milliGRAM(s) Oral daily  atorvastatin 80 milliGRAM(s) Oral at bedtime  buMETAnide 2 milliGRAM(s) Oral <User Schedule>  buMETAnide 1 milliGRAM(s) Oral <User Schedule>  clopidogrel Tablet 75 milliGRAM(s) Oral daily  dextrose 40% Gel 15 Gram(s) Oral once  dextrose 5%. 1000 milliLiter(s) (50 mL/Hr) IV Continuous <Continuous>  dextrose 5%. 1000 milliLiter(s) (100 mL/Hr) IV Continuous <Continuous>  dextrose 50% Injectable 25 Gram(s) IV Push once  dextrose 50% Injectable 12.5 Gram(s) IV Push once  dextrose 50% Injectable 25 Gram(s) IV Push once  glucagon  Injectable 1 milliGRAM(s) IntraMuscular once  heparin   Injectable 5000 Unit(s) SubCutaneous every 8 hours  hydrALAZINE 37.5 milliGRAM(s) Oral every 8 hours  insulin glargine Injectable (LANTUS) 50 Unit(s) SubCutaneous at bedtime  insulin glargine Injectable (LANTUS) 25 Unit(s) SubCutaneous every morning  insulin lispro (ADMELOG) corrective regimen sliding scale   SubCutaneous three times a day before meals  insulin lispro (ADMELOG) corrective regimen sliding scale   SubCutaneous at bedtime  insulin lispro Injectable (ADMELOG) 16 Unit(s) SubCutaneous three times a day before meals  levothyroxine 50 MICROGram(s) Oral daily  melatonin 3 milliGRAM(s) Oral at bedtime  metoprolol succinate  milliGRAM(s) Oral every 12 hours  senna 2 Tablet(s) Oral at bedtime      Physical Exam  General: Patient comfortable in bed  Vital Signs Last 12 Hrs  T(F): 97.6 (01-31-21 @ 11:22), Max: 98.7 (01-31-21 @ 04:48)  HR: 82 (01-31-21 @ 11:22) (78 - 82)  BP: 102/54 (01-31-21 @ 11:22) (102/54 - 125/68)  BP(mean): --  RR: 18 (01-31-21 @ 11:22) (18 - 18)  SpO2: 95% (01-31-21 @ 11:22) (95% - 97%)  Neck: No palpable thyroid nodules.  CVS: S1S2, No murmurs  Respiratory: No wheezing, no crepitations  GI: Abdomen soft, bowel sounds positive  Musculoskeletal:  edema lower extremities.   Skin: No skin rashes, no ecchymosis    Diagnostics             Chief complaint  Patient is a 75y old  Female who presents with a chief complaint of increased work of breathing (31 Jan 2021 12:23)   Review of systems  Patient in bed, looks comfortable, no hypoglycemic episodes.    Labs and Fingersticks  CAPILLARY BLOOD GLUCOSE      POCT Blood Glucose.: 194 mg/dL (31 Jan 2021 11:36)  POCT Blood Glucose.: 164 mg/dL (31 Jan 2021 07:12)  POCT Blood Glucose.: 199 mg/dL (30 Jan 2021 20:57)  POCT Blood Glucose.: 153 mg/dL (30 Jan 2021 16:29)      Anion Gap, Serum: 17 (01-31 @ 07:29)  Anion Gap, Serum: 17 (01-30 @ 08:53)      Calcium, Total Serum: 9.4 (01-31 @ 07:29)  Calcium, Total Serum: 9.6 (01-30 @ 08:53)          01-31    139  |  103  |  105<H>  ----------------------------<  148<H>  4.3   |  19<L>  |  1.98<H>    Ca    9.4      31 Jan 2021 07:29  Phos  4.4     01-31  Mg     2.7     01-31                          9.6    12.63 )-----------( 537      ( 31 Jan 2021 07:29 )             31.5     Medications  MEDICATIONS  (STANDING):  amoxicillin  500 milliGRAM(s)/clavulanate 1 Tablet(s) Oral two times a day  aspirin enteric coated 81 milliGRAM(s) Oral daily  atorvastatin 80 milliGRAM(s) Oral at bedtime  buMETAnide 2 milliGRAM(s) Oral <User Schedule>  buMETAnide 1 milliGRAM(s) Oral <User Schedule>  clopidogrel Tablet 75 milliGRAM(s) Oral daily  dextrose 40% Gel 15 Gram(s) Oral once  dextrose 5%. 1000 milliLiter(s) (50 mL/Hr) IV Continuous <Continuous>  dextrose 5%. 1000 milliLiter(s) (100 mL/Hr) IV Continuous <Continuous>  dextrose 50% Injectable 25 Gram(s) IV Push once  dextrose 50% Injectable 12.5 Gram(s) IV Push once  dextrose 50% Injectable 25 Gram(s) IV Push once  glucagon  Injectable 1 milliGRAM(s) IntraMuscular once  heparin   Injectable 5000 Unit(s) SubCutaneous every 8 hours  hydrALAZINE 37.5 milliGRAM(s) Oral every 8 hours  insulin glargine Injectable (LANTUS) 50 Unit(s) SubCutaneous at bedtime  insulin glargine Injectable (LANTUS) 25 Unit(s) SubCutaneous every morning  insulin lispro (ADMELOG) corrective regimen sliding scale   SubCutaneous three times a day before meals  insulin lispro (ADMELOG) corrective regimen sliding scale   SubCutaneous at bedtime  insulin lispro Injectable (ADMELOG) 16 Unit(s) SubCutaneous three times a day before meals  levothyroxine 50 MICROGram(s) Oral daily  melatonin 3 milliGRAM(s) Oral at bedtime  metoprolol succinate  milliGRAM(s) Oral every 12 hours  senna 2 Tablet(s) Oral at bedtime      Physical Exam  General: Patient comfortable in bed  Vital Signs Last 12 Hrs  T(F): 97.6 (01-31-21 @ 11:22), Max: 98.7 (01-31-21 @ 04:48)  HR: 82 (01-31-21 @ 11:22) (78 - 82)  BP: 102/54 (01-31-21 @ 11:22) (102/54 - 125/68)  BP(mean): --  RR: 18 (01-31-21 @ 11:22) (18 - 18)  SpO2: 95% (01-31-21 @ 11:22) (95% - 97%)  Neck: No palpable thyroid nodules.  CVS: S1S2, No murmurs  Respiratory: No wheezing, no crepitations  GI: Abdomen soft, bowel sounds positive  Musculoskeletal:  edema lower extremities.   Skin: No skin rashes, no ecchymosis    Diagnostics

## 2021-01-31 NOTE — PROGRESS NOTE ADULT - SUBJECTIVE AND OBJECTIVE BOX
PROGRESS NOTE: INTERNAL MEDICINE    Contact Information:  Zonia Lee PGY1   Pager: [phone] 7AM-7PM    Patient Name: SELVIN CLAIRE  LOS: 01-12-21 (19d)    Patient is a 75y old  Female who presents with a chief complaint of increased work of breathing (31 Jan 2021 06:42)      OVERNIGHT/INTERVAL EVENTS: Sinus rhythm, 1st degree 70s to 80s    SUBJECTIVE: [place] [id number]. Pt ate half of breakfast not hungry anymore. On ROS says yes to palpitations, denies that they occur frequently. Says she feels tired.     MEDICATIONS  (STANDING):  amoxicillin  500 milliGRAM(s)/clavulanate 1 Tablet(s) Oral two times a day  aspirin enteric coated 81 milliGRAM(s) Oral daily  atorvastatin 80 milliGRAM(s) Oral at bedtime  buMETAnide 2 milliGRAM(s) Oral <User Schedule>  buMETAnide 1 milliGRAM(s) Oral <User Schedule>  clopidogrel Tablet 75 milliGRAM(s) Oral daily  dextrose 40% Gel 15 Gram(s) Oral once  dextrose 5%. 1000 milliLiter(s) (50 mL/Hr) IV Continuous <Continuous>  dextrose 5%. 1000 milliLiter(s) (100 mL/Hr) IV Continuous <Continuous>  dextrose 50% Injectable 25 Gram(s) IV Push once  dextrose 50% Injectable 12.5 Gram(s) IV Push once  dextrose 50% Injectable 25 Gram(s) IV Push once  glucagon  Injectable 1 milliGRAM(s) IntraMuscular once  heparin   Injectable 5000 Unit(s) SubCutaneous every 8 hours  hydrALAZINE 37.5 milliGRAM(s) Oral every 8 hours  insulin glargine Injectable (LANTUS) 50 Unit(s) SubCutaneous at bedtime  insulin glargine Injectable (LANTUS) 25 Unit(s) SubCutaneous every morning  insulin lispro (ADMELOG) corrective regimen sliding scale   SubCutaneous three times a day before meals  insulin lispro (ADMELOG) corrective regimen sliding scale   SubCutaneous at bedtime  insulin lispro Injectable (ADMELOG) 16 Unit(s) SubCutaneous three times a day before meals  levothyroxine 50 MICROGram(s) Oral daily  melatonin 3 milliGRAM(s) Oral at bedtime  metoprolol succinate  milliGRAM(s) Oral every 12 hours  senna 2 Tablet(s) Oral at bedtime    MEDICATIONS  (PRN):  ALPRAZolam 0.25 milliGRAM(s) Oral every 8 hours PRN anxiety  cyclobenzaprine 5 milliGRAM(s) Oral three times a day PRN Muscle Spasm  polyethylene glycol 3350 17 Gram(s) Oral two times a day PRN Constipation    Allergies  azithromycin (Hives; Pruritus)      Intolerances      PHYSICAL EXAM:    Vital Signs Last 24 Hrs  T(C): 37.1 (31 Jan 2021 04:48), Max: 37.1 (30 Jan 2021 11:17)  T(F): 98.7 (31 Jan 2021 04:48), Max: 98.7 (30 Jan 2021 11:17)  HR: 78 (31 Jan 2021 04:48) (77 - 83)  BP: 125/68 (31 Jan 2021 04:48) (100/63 - 125/68)  BP(mean): --  RR: 18 (31 Jan 2021 04:48) (18 - 18)  SpO2: 97% (31 Jan 2021 04:48) (97% - 98%)    PHYSICAL EXAM:  GENERAL: NAD, cachectic, deconditioned  HEAD:  Atraumatic, Normocephalic  EYES: EOMI, PERRLA, conjunctiva and sclera clear  CHEST/LUNG: no wheeze, clear to auscultation b/l anterior fields  HEART: Regular rate and rhythm; No murmurs, rubs, or gallops, diminished/muffled heart sounds  ABDOMEN: Soft, Nontender, Nondistended; Bowel sounds present  EXTREMITIES:  2+ Peripheral Pulses, No clubbing, cyanosis, or edema, +dry rough feet, improved from previously  PSYCHIATRIC:  responds to questions, anxious       01-30-21 @ 07:01  -  01-31-21 @ 07:00  --------------------------------------------------------  IN: 760 mL / OUT: 1300 mL / NET: -540 mL        PATIENT LABORATORY DATA:    CAPILLARY BLOOD GLUCOSE      POCT Blood Glucose.: 164 mg/dL (31 Jan 2021 07:12)  POCT Blood Glucose.: 199 mg/dL (30 Jan 2021 20:57)  POCT Blood Glucose.: 153 mg/dL (30 Jan 2021 16:29)  POCT Blood Glucose.: 228 mg/dL (30 Jan 2021 11:32)                          9.6    12.63 )-----------( 537      ( 31 Jan 2021 07:29 )             31.5     01-31    139  |  103  |  105<H>  ----------------------------<  148<H>  4.3   |  19<L>  |  1.98<H>    Ca    9.4      31 Jan 2021 07:29  Phos  4.4     01-31  Mg     2.7     01-31    Microbiology:     Culture - Urine (collected 01-24-21 @ 16:55)  Source: .Urine Clean Catch (Midstream)  Final Report (01-26-21 @ 09:46):    10,000 - 49,000 CFU/mL Escherichia coli  Organism: Escherichia coli (01-26-21 @ 09:46)  Organism: Escherichia coli (01-26-21 @ 09:46)      -  Amikacin: S <=16      -  Amoxicillin/Clavulanic Acid: S <=8/4      -  Ampicillin: S <=8 These ampicillin results predict results for amoxicillin      -  Ampicillin/Sulbactam: S <=4/2 Enterobacter, Citrobacter, and Serratia may develop resistance during prolonged therapy (3-4 days)      -  Aztreonam: S <=4      -  Cefazolin: S <=2 ([id number]) For uncomplicated UTI with K. pneumoniae, E. coli, or P. mirablis: OMARI <=16 is sensitive and OMARI >=32 is resistant. This also predicts results for oral agents cefaclor, cefdinir, cefpodoxime, cefprozil, cefuroxime axetil, cephalexin and locarbef for uncomplicated UTI. Note that some isolates may be susceptible to these agents while testing resistant to cefazolin.      -  Cefepime: S <=2      -  Cefoxitin: S <=8      -  Ceftriaxone: S <=1 Enterobacter, Citrobacter, and Serratia may develop resistance during prolonged therapy      -  Ciprofloxacin: S <=0.25      -  Ertapenem: S <=0.5      -  Gentamicin: S <=2      -  Imipenem: S <=1      -  Levofloxacin: S <=0.5      -  Meropenem: S <=1      -  Nitrofurantoin: S <=32 Should not be used to treat pyelonephritis      -  Piperacillin/Tazobactam: S <=8      -  Tigecycline: S <=2      -  Tobramycin: S <=2      -  Trimethoprim/Sulfamethoxazole: S <=0.5/9.5      Method Type: OMARI          RADIOLOGY & ADDITIONAL TESTS:  EKG:     PENDING DIAGNOSTIC WORK UP:

## 2021-01-31 NOTE — PROGRESS NOTE ADULT - PROBLEM SELECTOR PLAN 2
-TTE 1/13 showed EF of 20% with global LV and RV dysfunction w/ MS.   Repeat TTE 1/19 showed severely decreased LV fx and grossly decreased RV fx w/ EF 25-30%  - Heart failure and cardiology following  - see above for history.    - s/p milrinone drip  - s/p bumex drip.  Started back on PO regimen 1/26  - per HF recs:  c/w toprol xl 100 BID, hydralazine 37.5 TID, 2mg PO bumex in AM, 1mg PO bumex in PM.  Monitor over the weekend.

## 2021-01-31 NOTE — PROGRESS NOTE ADULT - ASSESSMENT
76 yo F w/ PMH of CKD stage 4 (baseline Cr 1.9-2.2) HTN, T2DM, CAD s/p CABG X3 (2014 at [facility]), non-dilated ICM (EF 20-25%), severe mitral regurgitation s/p mitral clip (6/13/19), severe pulm HTN, hypothyroidism who presented for evaluation of SOB and was admitted for ADHF.    CKD stage 4  - baseline Cr 1.9-2.2; has had recurrent MERVIN's over the years  - CKD is likely 2/2 recurrent MERVIN's + underlying DM/HTN  - Scr stable today. BUN elevated due to diuretics.   - Pt on oral diuretics. Diuretics per heart failure team   - Avoid hypotension   - renal sonogram in the past with simple renal cyst  - d/w daughter and son-in-law on 01/23 about worsening renal function and if no improvement likely need for dialysis. They were also informed that she will be a poor candidate for HD in view of CHF. They understand but wants HD if needed. Consent for HD is in chart.  - She is not uremic, monitor at present  - Avoid nephrotoxins including NSAIDs, IV contrast (if possible), [employer] products  - monitor I/O's accurately    HTN  BP controlled  Low salt diet   MOnitor BP    Proteinuria/Hematuria  - likely from underlying DM  - has 1.8 grams proteinuria  - Hep B, Hep C, HIV RF, C3, C4, p-ANCA, c-ANCA, RPR neg  - weak gamma-migrating protein, weak IgG lambda protein band noted in the past. Pt to follow w/ heme   - DEAN 1:320 positive  - RPR neg, FTA +

## 2021-01-31 NOTE — PROGRESS NOTE ADULT - PROBLEM SELECTOR PLAN 1
- 2/2 CHF exacerbation.  EF 20% w/ mitral stenosis.  CXR on admission showed bilateral predominantly lower lobe hazy opacities which is likely 2/2 fluid.  Not presenting w/ clinical signs of PNA and CT chest showed no focal consolidations.  Monitored off abx.  Blood cx also negative  - pt was being treated w/ IV diuresis which was stopped but restarted after RHC 1/20 showed elevated filling pressures. Milrinone drip and Bumex restarted (bladder scan showed evidence of retention (120cc) @ that time).    - Pt now s/p Bumex drip and milrinone drip - ended 1/26  - pt w/ persistent dyspnea however CT chest 1/25 shows clear lungs  - started PRN xanax for possible anxiety component  - c/w management of chronic HFrEF

## 2021-01-31 NOTE — PROGRESS NOTE ADULT - SUBJECTIVE AND OBJECTIVE BOX
pt seen and examined, no complaints, ROS - .     Review of Systems:   Constitutional: [ ] fevers, [ ] chills.   Skin: [ ] dry skin. [ ] rashes.  Psychiatric: [ ] depression, [ ] anxiety.   Gastrointestinal: [ ] BRBPR, [ ] melena.   Neurological: [ ] confusion. [ ] seizures. [ ] shuffling gait.   Ears,Nose,Mouth and Throat: [ ] ear pain [ ] sore throat.   Eyes: [ ] diplopia.   Respiratory: [ ] hemoptysis. [ ] shortness of breath  Cardiovascular: See HPI above  Hematologic/Lymphatic: [ ] anemia. [ ] painful nodes. [ ] prolonged bleeding.   Genitourinary: [ ] hematuria. [ ] flank pain.   Endocrine: [ ] significant change in weight. [ ] intolerance to heat and cold.     Review of systems [ x] otherwise negative, [ ] otherwise unable to obtain    FH: no family history of sudden cardiac death in first degree relatives    SH: [ ] tobacco, [ ] alcohol, [ ] drugs             ALPRAZolam 0.25 milliGRAM(s) Oral every 8 hours PRN  amoxicillin  500 milliGRAM(s)/clavulanate 1 Tablet(s) Oral two times a day  aspirin enteric coated 81 milliGRAM(s) Oral daily  atorvastatin 80 milliGRAM(s) Oral at bedtime  buMETAnide 2 milliGRAM(s) Oral <User Schedule>  buMETAnide 1 milliGRAM(s) Oral <User Schedule>  clopidogrel Tablet 75 milliGRAM(s) Oral daily  cyclobenzaprine 5 milliGRAM(s) Oral three times a day PRN  dextrose 40% Gel 15 Gram(s) Oral once  dextrose 5%. 1000 milliLiter(s) IV Continuous <Continuous>  dextrose 5%. 1000 milliLiter(s) IV Continuous <Continuous>  dextrose 50% Injectable 25 Gram(s) IV Push once  dextrose 50% Injectable 12.5 Gram(s) IV Push once  dextrose 50% Injectable 25 Gram(s) IV Push once  glucagon  Injectable 1 milliGRAM(s) IntraMuscular once  heparin   Injectable 5000 Unit(s) SubCutaneous every 8 hours  hydrALAZINE 37.5 milliGRAM(s) Oral every 8 hours  insulin glargine Injectable (LANTUS) 50 Unit(s) SubCutaneous at bedtime  insulin glargine Injectable (LANTUS) 25 Unit(s) SubCutaneous every morning  insulin lispro (ADMELOG) corrective regimen sliding scale   SubCutaneous three times a day before meals  insulin lispro (ADMELOG) corrective regimen sliding scale   SubCutaneous at bedtime  insulin lispro Injectable (ADMELOG) 16 Unit(s) SubCutaneous three times a day before meals  levothyroxine 50 MICROGram(s) Oral daily  melatonin 3 milliGRAM(s) Oral at bedtime  metoprolol succinate  milliGRAM(s) Oral every 12 hours  polyethylene glycol 3350 17 Gram(s) Oral two times a day PRN  senna 2 Tablet(s) Oral at bedtime                            9.6    12.63 )-----------( 537      ( 31 Jan 2021 07:29 )             31.5       Hemoglobin: 9.6 g/dL (01-31 @ 07:29)  Hemoglobin: 11.4 g/dL (01-30 @ 08:53)  Hemoglobin: 10.5 g/dL (01-29 @ 07:05)  Hemoglobin: 10.7 g/dL (01-28 @ 06:30)      01-31    139  |  103  |  105<H>  ----------------------------<  148<H>  4.3   |  19<L>  |  1.98<H>    Ca    9.4      31 Jan 2021 07:29  Phos  4.4     01-31  Mg     2.7     01-31      Creatinine Trend: 1.98<--, 1.97<--, 2.04<--, 2.13<--, 2.14<--, 2.19<--    COAGS:           T(C): 37.1 (01-31-21 @ 04:48), Max: 37.1 (01-30-21 @ 11:17)  HR: 78 (01-31-21 @ 04:48) (77 - 83)  BP: 125/68 (01-31-21 @ 04:48) (100/63 - 125/68)  RR: 18 (01-31-21 @ 04:48) (18 - 18)  SpO2: 97% (01-31-21 @ 04:48) (97% - 98%)  Wt(kg): --    I&O's Summary    30 Jan 2021 07:01  -  31 Jan 2021 07:00  --------------------------------------------------------  IN: 760 mL / OUT: 1300 mL / NET: -540 mL      General: Well nourished in no acute distress. Alert and Oriented * 3.   Head: Normocephalic and atraumatic.   Neck: No JVD. No bruits. Supple. Does not appear to be enlarged.   Cardiovascular: + S1,S2 ; RRR Soft systolic murmur at the left lower sternal border. No rubs noted.    Lungs: CTA b/l. No rhonchi, rales or wheezes.   Abdomen: + BS, soft. Non tender. Non distended. No rebound. No guarding.   Extremities: No clubbing/cyanosis/edema.   Neurologic: Moves all four extremities. Full range of motion.   Skin: Warm and moist. The patient's skin has normal elasticity and good skin turgor.   Psychiatric: Appropriate mood and affect.  Musculoskeletal: Normal range of motion, normal strength    TELEMETRY: SR 70s    < from: Cardiac Cath Lab - Adult (01.20.21 @ 10:22) >  PROCEDURE:  --  Right heart catheterization.  --  Sonosite - Diagnostic.  COMPLICATIONS: There were no complications.  DIAGNOSTIC RECOMMENDATIONS: The patient should continuewith the present  medications.  Prepared and signed by  Cesar Jackson M.D.       A/P) 74 y/o female PMH HTN, HLD, DM, CAD s/p CABG 2014 and prior PCI, severe mitral regurgitation (s/p mitral clip 2019), pulmonary HTN, HF (EF 21 % June 2019), CKD IV not on dialysis (b/l SCr 2-2.2), hypothyroidism a/w acute on chronic systolic CHF with NYHA IV functional status.    -CT C spine noted with no fracture, degenerative changes  -repeat echo noted but didn't reassess degree of mitral stenosis  -Continue dapt/statin   -RHC noted above - Filling pressures are appropriate considering LV function and the mitral valve gradient  -Volume status improved - continue PO Bumex  -Continue Toprol XL  -Tolerating hydralazine  -very poor candidate for a primary prevention ICD  -no further inpatient cardiac w/u planned  -D/C planning per primary team  -f/u with Dr. Sotelo on Friday 2/5 at 2:20 PM

## 2021-01-31 NOTE — PROGRESS NOTE ADULT - PROBLEM SELECTOR PLAN 8
- continue Atorvastatin 80 mg    # Asymptomatic bacteruria  - pt w/ intermittent wbc.  Denies dysruia/frequency however only source for WBC given neg cxr and cultures.  will treat w/ 3 day course of renally dosed augment for E. coli UTI

## 2021-01-31 NOTE — PROGRESS NOTE ADULT - ASSESSMENT
Assessment  DMT2: 75y Female with DM T2 with hyperglycemia, A1C 7.1%, was on insulin at home, now on basal bolus insulin, blood sugars trending within overall acceptable range, no hypoglycemic episodes, eating inconsistent meals, appears comfortable.  HF: on medications, stable, monitored, FU Cardiology.  Hypothyroidism: On Synthroid 50 mcg po daily, compliant with Synthroid intake, asymptomatic, euthyroid.  CKD: Monitor labs/BMP      Jillian Banks MD  Cell: [phone]  Office: [phone]       Assessment  DMT2: 75y Female with DM T2 with hyperglycemia, A1C 7.1%, was on insulin at home, now on basal bolus insulin, blood sugars trending within overall acceptable range, no hypoglycemic episodes, eating inconsistent meals,  appears comfortable.  HF: on medications, stable, monitored, FU Cardiology.  Hypothyroidism: On Synthroid 50 mcg po daily, compliant with Synthroid intake, asymptomatic, euthyroid.  CKD: Monitor labs/BMP      Jillian Banks MD  Cell: [phone]  Office: [phone]

## 2021-01-31 NOTE — PROGRESS NOTE ADULT - PROBLEM SELECTOR PLAN 4
Patient with persistent neck pain  Pain increased with movement, and with tenderness to palpation  Most likely MSK in nature  - pain control with tylenol ATC and lidocaine patch  - CT cervical spine unremarkable for pathology  - c/w flexeril PRN

## 2021-01-31 NOTE — PROGRESS NOTE ADULT - SUBJECTIVE AND OBJECTIVE BOX
[facility] [place] [facility]   MD Dion Dasilva MD, D.O. Ruoru Wong, PA    From 7 AM - 5 PM:  OFFICE: [phone]  Dr. Muhammad cell: [phone]  Dr. Montero cell: [phone]  Dr. Soria cell: [phone]  RASHIDA Smith cell: [phone]    From 5 PM - 7 AM: Answering Service: [phone]  Date of service: 01-31-21 @ 12:23    RENAL FOLLOW UP NOTE  --------------------------------------------------------------------------------  HPI:  Pt seen and examined at bedside.       PAST HISTORY  --------------------------------------------------------------------------------  No significant changes to PMH, PSH, FHx, SHx, unless otherwise noted    ALLERGIES & MEDICATIONS  --------------------------------------------------------------------------------  Allergies    azithromycin (Hives; Pruritus)    Intolerances      Standing Inpatient Medications  amoxicillin  500 milliGRAM(s)/clavulanate 1 Tablet(s) Oral two times a day  aspirin enteric coated 81 milliGRAM(s) Oral daily  atorvastatin 80 milliGRAM(s) Oral at bedtime  buMETAnide 2 milliGRAM(s) Oral <User Schedule>  buMETAnide 1 milliGRAM(s) Oral <User Schedule>  clopidogrel Tablet 75 milliGRAM(s) Oral daily  dextrose 40% Gel 15 Gram(s) Oral once  dextrose 5%. 1000 milliLiter(s) IV Continuous <Continuous>  dextrose 5%. 1000 milliLiter(s) IV Continuous <Continuous>  dextrose 50% Injectable 25 Gram(s) IV Push once  dextrose 50% Injectable 12.5 Gram(s) IV Push once  dextrose 50% Injectable 25 Gram(s) IV Push once  glucagon  Injectable 1 milliGRAM(s) IntraMuscular once  heparin   Injectable 5000 Unit(s) SubCutaneous every 8 hours  hydrALAZINE 37.5 milliGRAM(s) Oral every 8 hours  insulin glargine Injectable (LANTUS) 50 Unit(s) SubCutaneous at bedtime  insulin glargine Injectable (LANTUS) 25 Unit(s) SubCutaneous every morning  insulin lispro (ADMELOG) corrective regimen sliding scale   SubCutaneous three times a day before meals  insulin lispro (ADMELOG) corrective regimen sliding scale   SubCutaneous at bedtime  insulin lispro Injectable (ADMELOG) 16 Unit(s) SubCutaneous three times a day before meals  levothyroxine 50 MICROGram(s) Oral daily  melatonin 3 milliGRAM(s) Oral at bedtime  metoprolol succinate  milliGRAM(s) Oral every 12 hours  senna 2 Tablet(s) Oral at bedtime    PRN Inpatient Medications  ALPRAZolam 0.25 milliGRAM(s) Oral every 8 hours PRN  cyclobenzaprine 5 milliGRAM(s) Oral three times a day PRN  polyethylene glycol 3350 17 Gram(s) Oral two times a day PRN      REVIEW OF SYSTEMS  --------------------------------------------------------------------------------  General: no fever  CVS: no chest pain  RESP: no sob, no cough  ABD: no abdominal pain  : no dysuria,  MSK: no edema     VITALS/PHYSICAL EXAM  --------------------------------------------------------------------------------  T(C): 36.4 (01-31-21 @ 11:22), Max: 37.1 (01-31-21 @ 04:48)  HR: 82 (01-31-21 @ 11:22) (77 - 83)  BP: 102/54 (01-31-21 @ 11:22) (100/63 - 125/68)  RR: 18 (01-31-21 @ 11:22) (18 - 18)  SpO2: 95% (01-31-21 @ 11:22) (95% - 97%)  Wt(kg): --        01-30-21 @ 07:01  -  01-31-21 @ 07:00  --------------------------------------------------------  IN: 760 mL / OUT: 1300 mL / NET: -540 mL    01-31-21 @ 07:01  -  01-31-21 @ 12:23  --------------------------------------------------------  IN: 200 mL / OUT: 0 mL / NET: 200 mL      Physical Exam:  	Gen: NAD  	HEENT: MMM  	Pulm: CTA B/L  	CV: S1S2  	Abd: Soft, +BS  	Ext: No LE edema B/L                      Neuro: Awake   	Skin: Warm and Dry     LABS/STUDIES  --------------------------------------------------------------------------------              9.6    12.63 >-----------<  537      [01-31-21 @ 07:29]              31.5     139  |  103  |  105  ----------------------------<  148      [01-31-21 @ 07:29]  4.3   |  19  |  1.98        Ca     9.4     [01-31-21 @ 07:29]      Mg     2.7     [01-31-21 @ 07:29]      Phos  4.4     [01-31-21 @ 07:29]      Creatinine Trend:  SCr 1.98 [01-31 @ 07:29]  SCr 1.97 [01-30 @ 08:53]  SCr 2.04 [01-29 @ 07:10]  SCr 2.13 [01-28 @ 07:47]  SCr 2.14 [01-27 @ 06:31]    Urinalysis - [01-22-21 @ 22:32]      Color Light Yellow / Appearance Clear / SG 1.011 / pH 5.5      Gluc Trace / Ketone Negative  / Bili Negative / Urobili Negative       Blood Negative / Protein 30 mg/dL / Leuk Est Large / Nitrite Negative      RBC 2 / WBC 35 / Hyaline 1 / Gran  / Sq Epi  / Non Sq Epi 2 / Bacteria Negative      Ferritin 111      [01-13-21 @ 01:42]  HbA1c 7.5      [10-08-19 @ 14:30]  TSH 2.15      [01-13-21 @ 10:07]

## 2021-02-01 LAB
ANION GAP SERPL CALC-SCNC: 17 MMOL/L — SIGNIFICANT CHANGE UP (ref 5–17)
BASOPHILS # BLD AUTO: 0 K/UL — SIGNIFICANT CHANGE UP (ref 0–0.2)
BUN SERPL-MCNC: 111 MG/DL — HIGH (ref 7–23)
CALCIUM SERPL-MCNC: 9.5 MG/DL — SIGNIFICANT CHANGE UP (ref 8.4–10.5)
CHLORIDE SERPL-SCNC: 102 MMOL/L — SIGNIFICANT CHANGE UP (ref 96–108)
CO2 SERPL-SCNC: 17 MMOL/L — LOW (ref 22–31)
CREAT SERPL-MCNC: 1.97 MG/DL — HIGH (ref 0.5–1.3)
EOSINOPHIL # BLD AUTO: 0.12 K/UL — SIGNIFICANT CHANGE UP (ref 0–0.5)
EOSINOPHIL NFR BLD AUTO: 1 % — SIGNIFICANT CHANGE UP (ref 0–6)
GLUCOSE BLDC GLUCOMTR-MCNC: 184 MG/DL — HIGH (ref 70–99)
GLUCOSE BLDC GLUCOMTR-MCNC: 185 MG/DL — HIGH (ref 70–99)
GLUCOSE BLDC GLUCOMTR-MCNC: 89 MG/DL — SIGNIFICANT CHANGE UP (ref 70–99)
GLUCOSE BLDC GLUCOMTR-MCNC: 91 MG/DL — SIGNIFICANT CHANGE UP (ref 70–99)
GLUCOSE SERPL-MCNC: 94 MG/DL — SIGNIFICANT CHANGE UP (ref 70–99)
HCT VFR BLD CALC: 28.1 % — LOW (ref 34.5–45)
HCT VFR BLD CALC: 32.3 % — LOW (ref 34.5–45)
HGB BLD-MCNC: 10 G/DL — LOW (ref 11.5–15.5)
HGB BLD-MCNC: 8.7 G/DL — LOW (ref 11.5–15.5)
LYMPHOCYTES # BLD AUTO: 1.62 K/UL — SIGNIFICANT CHANGE UP (ref 1–3.3)
LYMPHOCYTES # BLD AUTO: 14 % — SIGNIFICANT CHANGE UP (ref 13–44)
MAGNESIUM SERPL-MCNC: 2.6 MG/DL — SIGNIFICANT CHANGE UP (ref 1.6–2.6)
MCHC RBC-ENTMCNC: 25.9 PG — LOW (ref 27–34)
MCHC RBC-ENTMCNC: 26.1 PG — LOW (ref 27–34)
MCHC RBC-ENTMCNC: 31 GM/DL — LOW (ref 32–36)
MCHC RBC-ENTMCNC: 31 GM/DL — LOW (ref 32–36)
MCV RBC AUTO: 83.6 FL — SIGNIFICANT CHANGE UP (ref 80–100)
MCV RBC AUTO: 84.3 FL — SIGNIFICANT CHANGE UP (ref 80–100)
MONOCYTES # BLD AUTO: 0.81 K/UL — SIGNIFICANT CHANGE UP (ref 0–0.9)
MONOCYTES NFR BLD AUTO: 7 % — SIGNIFICANT CHANGE UP (ref 2–14)
MYELOCYTES NFR BLD: 1 % — HIGH (ref 0–0)
NEUTROPHILS # BLD AUTO: 8.93 K/UL — HIGH (ref 1.8–7.4)
NEUTROPHILS NFR BLD AUTO: 77 % — SIGNIFICANT CHANGE UP (ref 43–77)
NRBC # BLD: 0 /100 WBCS — SIGNIFICANT CHANGE UP (ref 0–0)
NRBC # BLD: 0 /100 WBCS — SIGNIFICANT CHANGE UP (ref 0–0)
PHOSPHATE SERPL-MCNC: 4.8 MG/DL — HIGH (ref 2.5–4.5)
PLAT MORPH BLD: NORMAL — SIGNIFICANT CHANGE UP
PLATELET # BLD AUTO: 489 K/UL — HIGH (ref 150–400)
PLATELET # BLD AUTO: 522 K/UL — HIGH (ref 150–400)
POTASSIUM SERPL-MCNC: 3.8 MMOL/L — SIGNIFICANT CHANGE UP (ref 3.5–5.3)
POTASSIUM SERPL-SCNC: 3.8 MMOL/L — SIGNIFICANT CHANGE UP (ref 3.5–5.3)
RBC # BLD: 3.36 M/UL — LOW (ref 3.8–5.2)
RBC # BLD: 3.83 M/UL — SIGNIFICANT CHANGE UP (ref 3.8–5.2)
RBC # FLD: 16.2 % — HIGH (ref 10.3–14.5)
RBC # FLD: 16.2 % — HIGH (ref 10.3–14.5)
RBC BLD AUTO: SIGNIFICANT CHANGE UP
SARS-COV-2 RNA SPEC QL NAA+PROBE: SIGNIFICANT CHANGE UP
SODIUM SERPL-SCNC: 136 MMOL/L — SIGNIFICANT CHANGE UP (ref 135–145)
WBC # BLD: 11.55 K/UL — HIGH (ref 3.8–10.5)
WBC # BLD: 11.6 K/UL — HIGH (ref 3.8–10.5)
WBC # FLD AUTO: 11.55 K/UL — HIGH (ref 3.8–10.5)
WBC # FLD AUTO: 11.6 K/UL — HIGH (ref 3.8–10.5)

## 2021-02-01 PROCEDURE — 99233 SBSQ HOSP IP/OBS HIGH 50: CPT | Mod: GC

## 2021-02-01 RX ORDER — INSULIN GLARGINE 100 [IU]/ML
45 INJECTION, SOLUTION SUBCUTANEOUS AT BEDTIME
Refills: 0 | Status: DISCONTINUED | OUTPATIENT
Start: 2021-02-01 | End: 2021-02-02

## 2021-02-01 RX ORDER — LORATADINE 10 MG/1
1 TABLET ORAL
Qty: 0 | Refills: 0 | DISCHARGE

## 2021-02-01 RX ADMIN — Medication 37.5 MILLIGRAM(S): at 13:10

## 2021-02-01 RX ADMIN — BUMETANIDE 1 MILLIGRAM(S): 0.25 INJECTION INTRAMUSCULAR; INTRAVENOUS at 16:59

## 2021-02-01 RX ADMIN — HEPARIN SODIUM 5000 UNIT(S): 5000 INJECTION INTRAVENOUS; SUBCUTANEOUS at 13:10

## 2021-02-01 RX ADMIN — Medication 16 UNIT(S): at 12:11

## 2021-02-01 RX ADMIN — ATORVASTATIN CALCIUM 80 MILLIGRAM(S): 80 TABLET, FILM COATED ORAL at 20:13

## 2021-02-01 RX ADMIN — Medication 0.25 MILLIGRAM(S): at 20:14

## 2021-02-01 RX ADMIN — HEPARIN SODIUM 5000 UNIT(S): 5000 INJECTION INTRAVENOUS; SUBCUTANEOUS at 20:13

## 2021-02-01 RX ADMIN — Medication 37.5 MILLIGRAM(S): at 04:52

## 2021-02-01 RX ADMIN — Medication 100 MILLIGRAM(S): at 16:59

## 2021-02-01 RX ADMIN — Medication 0.25 MILLIGRAM(S): at 10:46

## 2021-02-01 RX ADMIN — INSULIN GLARGINE 45 UNIT(S): 100 INJECTION, SOLUTION SUBCUTANEOUS at 22:04

## 2021-02-01 RX ADMIN — Medication 2: at 12:11

## 2021-02-01 RX ADMIN — Medication 16 UNIT(S): at 16:59

## 2021-02-01 RX ADMIN — BUMETANIDE 2 MILLIGRAM(S): 0.25 INJECTION INTRAMUSCULAR; INTRAVENOUS at 04:53

## 2021-02-01 RX ADMIN — CLOPIDOGREL BISULFATE 75 MILLIGRAM(S): 75 TABLET, FILM COATED ORAL at 10:47

## 2021-02-01 RX ADMIN — Medication 100 MILLIGRAM(S): at 04:53

## 2021-02-01 RX ADMIN — INSULIN GLARGINE 25 UNIT(S): 100 INJECTION, SOLUTION SUBCUTANEOUS at 08:20

## 2021-02-01 RX ADMIN — SENNA PLUS 2 TABLET(S): 8.6 TABLET ORAL at 20:14

## 2021-02-01 RX ADMIN — Medication 3 MILLIGRAM(S): at 20:14

## 2021-02-01 RX ADMIN — Medication 16 UNIT(S): at 08:20

## 2021-02-01 RX ADMIN — Medication 1 TABLET(S): at 04:52

## 2021-02-01 RX ADMIN — HEPARIN SODIUM 5000 UNIT(S): 5000 INJECTION INTRAVENOUS; SUBCUTANEOUS at 04:53

## 2021-02-01 RX ADMIN — Medication 50 MICROGRAM(S): at 04:52

## 2021-02-01 RX ADMIN — Medication 37.5 MILLIGRAM(S): at 20:13

## 2021-02-01 RX ADMIN — Medication 81 MILLIGRAM(S): at 10:47

## 2021-02-01 RX ADMIN — CYCLOBENZAPRINE HYDROCHLORIDE 5 MILLIGRAM(S): 10 TABLET, FILM COATED ORAL at 04:53

## 2021-02-01 NOTE — PROGRESS NOTE ADULT - ASSESSMENT
75y.o F Tamaz[place] speaking h/o HTN, HLD, DM, CAD s/p CABG 2014 and prior PCI, severe mitral regurgitation (s/p mitral clip 2019), pulmonary HTN (TTE 2019), HF (EF 21 % June 2019), CKD IV not on dialysis (b/l SCr 2-2.2), hypothyroidism admitted for hypoxic respiratory failure 2/2 acute on chronic HFrEF exacerbation in the setting of mitral stenosis c/b MERVIN on CKD.  Pt initially treated w/ IV diuresis but was started on milrinone drip after RHC showed persistent elevated filled pressures. Now s/p  IV diuresis, milrinone dosage decreased but is w/ continued dyspnea despite clinical euvolemic status.  PRN xanax started for possible anxiety component.  Labs w/ intermittent elevations in WBC in the setting of asymptomatic bacteruria - continuing to monitor off abx.

## 2021-02-01 NOTE — CHART NOTE - NSCHARTNOTEFT_GEN_A_CORE
Accidentally cancelled progress note from 1/20.  Please disregard strikes.
Nutrition Follow Up Note  Patient seen for: nutrition follow up    Chart reviewed, events noted.    Source: pt (Telugu speaking,  utilized [facility] ID #[id number]), RN, electronic medical record     Diet : DASH/TLC + Consistent Carbohydrate (with evening snacks) + 1200 ml fluid restriction + halal + 3 diet mighty health shakes daily    Patient reports: no acute GI distress, last BM 1/30    PO intake : good/fair, noted with some variable po intake at times 25-75% per nursing flow sheet. Per RN, pt likely with anxiety component contributing as noted in chart. Pt being provided diet mighty shakes with meals, she drinks these at times. Declines review of nutrition education at this time.    Source for PO intake: pt, RN, electronic medical record     Daily Weight in lbs: 121.9 (02-01 bed wt); wt fluctuations noted, diuresis noted (however no peripheral edema noted anytime recently) and varying scale methods used as well -> likely contributing factors  106.7 (01-30 standing wt), 114.1 (01-29 standing wt), 121.9 lbs (01-25 bed wt), 114.4 lbs (01-20), 115 lbs (01-19), 121 lbs (01-14)  110 lbs (dosing wt)    Pertinent Medications: MEDICATIONS  (STANDING):  aspirin enteric coated 81 milliGRAM(s) Oral daily  atorvastatin 80 milliGRAM(s) Oral at bedtime  buMETAnide 1 milliGRAM(s) Oral <User Schedule>  buMETAnide 2 milliGRAM(s) Oral <User Schedule>  clopidogrel Tablet 75 milliGRAM(s) Oral daily  dextrose 40% Gel 15 Gram(s) Oral once  dextrose 5%. 1000 milliLiter(s) (50 mL/Hr) IV Continuous <Continuous>  dextrose 5%. 1000 milliLiter(s) (100 mL/Hr) IV Continuous <Continuous>  dextrose 50% Injectable 25 Gram(s) IV Push once  dextrose 50% Injectable 12.5 Gram(s) IV Push once  dextrose 50% Injectable 25 Gram(s) IV Push once  glucagon  Injectable 1 milliGRAM(s) IntraMuscular once  heparin   Injectable 5000 Unit(s) SubCutaneous every 8 hours  hydrALAZINE 37.5 milliGRAM(s) Oral every 8 hours  insulin glargine Injectable (LANTUS) 25 Unit(s) SubCutaneous every morning  insulin glargine Injectable (LANTUS) 50 Unit(s) SubCutaneous at bedtime  insulin lispro (ADMELOG) corrective regimen sliding scale   SubCutaneous three times a day before meals  insulin lispro (ADMELOG) corrective regimen sliding scale   SubCutaneous at bedtime  insulin lispro Injectable (ADMELOG) 16 Unit(s) SubCutaneous three times a day before meals  levothyroxine 50 MICROGram(s) Oral daily  melatonin 3 milliGRAM(s) Oral at bedtime  metoprolol succinate  milliGRAM(s) Oral every 12 hours  senna 2 Tablet(s) Oral at bedtime    MEDICATIONS  (PRN):  ALPRAZolam 0.25 milliGRAM(s) Oral every 8 hours PRN anxiety  cyclobenzaprine 5 milliGRAM(s) Oral three times a day PRN Muscle Spasm  polyethylene glycol 3350 17 Gram(s) Oral two times a day PRN Constipation    Pertinent Labs: 02-01 @ 06:04: Na 136, <H>, Cr 1.97<H>, BG 94, K+ 3.8, Phos 4.8<H>, Mg 2.6    Finger Sticks:  POCT Blood Glucose.: 89 mg/dL (02-01 @ 07:31)  POCT Blood Glucose.: 250 mg/dL (01-31 @ 21:01)  POCT Blood Glucose.: 118 mg/dL (01-31 @ 16:23)  POCT Blood Glucose.: 194 mg/dL (01-31 @ 11:36)    Skin per nursing documentation: no pressure injuries noted  Edema: none noted    Estimated Needs:   [x] no change since previous assessment  [ ] recalculated:     Previous Nutrition Diagnosis: none     New Nutrition Diagnosis: Inadequate oral intake  Related to: extended hospitalization likely, possible anxiety component noted as well  As evidenced by: po intake meeting <75% estimated nutrient needs at times    Interventions: liberalize diet, continue oral nutrition supplementation    Recommend  1) Consider liberalizing diet to low sodium + Consistent Carbohydrate (with evening snacks), fluids per team and Halal per preference  2) Continue to provide 3 diet mighty health shakes daily to promote adequate po intake  3) Provide/review nutrition education as indicated     Monitoring and Evaluation:     Continue to monitor Nutritional intake, Tolerance to diet prescription, weights, labs, skin integrity    RD remains available upon request and will follow up per protocol. Janneth Cobb RD, CDN Pager: [phone]
RD CHF education chart note:    Patient was visited by RD for CHF education. Pt declined detailed heart failure education, though accepted RD's handouts on heart failure nutrition therapy, reading heart healthy nutrition labels, heart healthy shopping tips and low sodium food list. RD contact information left with patient for any future questioning.     July Rebolledo RD, CDN
RHC from today:    Pressures:  -- Pulmonary Artery (S/D/M): 50/21/32  Pressures:  -- Pulmonary Capillary Wedge: /  Pressures:  -- Right Atrium (a/v/M): //15  Pressures:  -- Right Ventricle (s/edp): 49/10/--  O2 Sats:  Baseline  O2 Sats:  - HR: 71  O2 Sats:  - Rhythm:  O2 Sats:  -- AO: 9.9/98/13.19  O2 Sats:  -- PA: 9.9/55.4/7.46  Outputs:  Baseline  Outputs:  -- CALCULATIONS[age]  Outputs:  -- CALCULATIONS: Body Surface Area: 1.43  Outputs:  -- CALCULATIONS: Height in cm: 150.00  Outputs:  -- CALCULATIONS: Sex: Female  Outputs:  -- CALCULATIONS: Weight in k.90  Outputs:  -- OUTPUTS: CO by Chapin: 3.49  Outputs:  -- OUTPUTS: Chapin cardiac index: 2.44    Recommendations:  Will start milrinone 0.2 mcg/kg/min to assist with diuresis given her fluctuating renal function  Start Bumex 2 mg IV BID  Stop ISDN  Can consider CardioMEMS implant to prevent further HF hospitalizations.
TO BE COMPLETED WITHIN 6 HOURS OF INITIAL ASSESSMENT:    For use in patients that have 2 sepsis criteria and new organ dysfunction   •	New or increased oxygen requirement  •	Creatinine >2mg/dL  •	Bilirubin>2mg/dL  •	Platelet <100,000/mm3  •	INR >1.5, PTT>60  •	Lactate >2    If patient persistent hypotension (SBP<90) or any lactate >4 then provider evaluation (Physician/PA/NP) within 30 minutes of bolus completion is required.    Vital Signs Last 24 Hrs  T(C): 36.6 (12 Jan 2021 19:26), Max: 37.4 (12 Jan 2021 17:38)  T(F): 97.9 (12 Jan 2021 19:26), Max: 99.3 (12 Jan 2021 17:38)  HR: 92 (12 Jan 2021 19:39) (92 - 99)  BP: 120/65 (12 Jan 2021 19:26) (120/65 - 144/70)  BP(mean): 88 (12 Jan 2021 18:53) (87 - 88)  RR: 40 (12 Jan 2021 19:26) (22 - 40)  SpO2: 100% (12 Jan 2021 19:39) (95% - 100%)  		  LUNGS:  [X] Clear bilaterally [  ] Wheeze [  ] Rhonchi [  ] Rales [  ] Crackles; Other:  HEART: [X]RRR [  ] No murmur [  ]  Normal S1S2 [  ] Tachycardia;  Other:  CAPILLARY REFILL:  	Fingers: [X] less than 2 seconds [  ] more than 2 seconds                                           Toes: [X]  less than 2 seconds [  ] more than 2 seconds   PERIPHERAL PULSES:  Radial: [X] Palpable  [  ]  non-palpable                                         Dorsalis Pedis: [X] Palpable  [  ] non-palpable                                         Posterior Tibial: [X] Palpable  [  ] non-palpable                                          Other:  SKIN:   [  ]  Diaphoretic  [  ]  Mottling  [  ]  Cold extremities  [X]  Warm [  ]  Dry                      Other:    BEDSIDE ULTRASOUND FINDINGS (IF APPLICABLE):    Labs:  12 Jan 2021 17:42    143    |  104    |  60     ----------------------------<  80     3.6     |  19     |  2.15     Ca    10.2       12 Jan 2021 17:42    TPro  8.5    /  Alb  4.3    /  TBili  0.5    /  DBili  x      /  AST  32     /  ALT  23     /  AlkPhos  113    12 Jan 2021 17:42                          11.0   10.97 )-----------( 361      ( 12 Jan 2021 17:42 )             35.7     PT/INR - ( 12 Jan 2021 17:42 )   PT: 13.7 sec;   INR: 1.15 ratio         PTT - ( 12 Jan 2021 17:42 )  PTT:52.2 sec  Lactate:    [X] I have re-evaluated the patient's fluid status and reviewed vital signs. Clinical evaluation demonstrates an appropriate response to fluid resuscitation, with subsequent plan as follows:    Patient received a modified total of fluid resuscitation for the following reason:  [ ] obesity BMI > 30, patient received 30 cc/kg according to Ideal Body Weight   [ ] acute, decompensated CHF   [ ] pulmonary edema    [ ] ESRD with signs of fluid overload  [ ] presence of LVAD   [ ] CHF and pHTN, poor renal function    Plan (orders must be placed in EMR):     [X]  Check Repeat Lactate   [  ]  No change in current plan  [  ]  Start Vasopressors:  [  ]  Repeat Fluid Bolus:  [  ] other:    Care Discussed with Consultants/Other Providers [X] YES  [ ] NO
Was informed by RN during beginning of night shift that patient is pulling out BiPAP and prefers NC, saturating well on NC but still tachypneic.   Patient went for CT chest  Asked radiology on call for prelim read: not best test due to patient's increase RR. However shows some small b/l pleural effusions, some signs of pulm edema, some ground glass opacities. Less likely PNA, more likely some fluid in lungs    Went to assess patient at bedside later during shift. Patient states she prefers NC, feels claustrophobic on non-rebreather and BiPAP. RR was 18, however patient does appear uncomfortable and belly-breathing.   Ordered an additional bumex 2mg    Teena Puente, PGY-1  NF

## 2021-02-01 NOTE — PROGRESS NOTE ADULT - PROBLEM SELECTOR PLAN 1
Will adjust Lantus to be 45u at bedtime/25u AM.  Will continue Admelog 16u before each meal as well as Admelog correction scale coverage. Will continue monitoring FS and FU.  Patient counseled for compliance with consistent low carb diet and exercise as tolerated outpatient.

## 2021-02-01 NOTE — PROGRESS NOTE ADULT - SUBJECTIVE AND OBJECTIVE BOX
Chief complaint  Patient is a 75y old  Female who presents with a chief complaint of increased work of breathing (01 Feb 2021 10:51)   Review of systems  Patient in bed, looks comfortable, no hypoglycemic episodes.    Labs and Fingersticks  CAPILLARY BLOOD GLUCOSE      POCT Blood Glucose.: 185 mg/dL (01 Feb 2021 11:49)  POCT Blood Glucose.: 89 mg/dL (01 Feb 2021 07:31)  POCT Blood Glucose.: 250 mg/dL (31 Jan 2021 21:01)  POCT Blood Glucose.: 118 mg/dL (31 Jan 2021 16:23)      Anion Gap, Serum: 17 (02-01 @ 06:04)  Anion Gap, Serum: 17 (01-31 @ 07:29)      Calcium, Total Serum: 9.5 (02-01 @ 06:04)  Calcium, Total Serum: 9.4 (01-31 @ 07:29)          02-01    136  |  102  |  111<H>  ----------------------------<  94  3.8   |  17<L>  |  1.97<H>    Ca    9.5      01 Feb 2021 06:04  Phos  4.8     02-01  Mg     2.6     02-01                          10.0   11.55 )-----------( 522      ( 01 Feb 2021 12:50 )             32.3     Medications  MEDICATIONS  (STANDING):  aspirin enteric coated 81 milliGRAM(s) Oral daily  atorvastatin 80 milliGRAM(s) Oral at bedtime  buMETAnide 2 milliGRAM(s) Oral <User Schedule>  buMETAnide 1 milliGRAM(s) Oral <User Schedule>  clopidogrel Tablet 75 milliGRAM(s) Oral daily  dextrose 40% Gel 15 Gram(s) Oral once  dextrose 5%. 1000 milliLiter(s) (50 mL/Hr) IV Continuous <Continuous>  dextrose 5%. 1000 milliLiter(s) (100 mL/Hr) IV Continuous <Continuous>  dextrose 50% Injectable 25 Gram(s) IV Push once  dextrose 50% Injectable 12.5 Gram(s) IV Push once  dextrose 50% Injectable 25 Gram(s) IV Push once  glucagon  Injectable 1 milliGRAM(s) IntraMuscular once  heparin   Injectable 5000 Unit(s) SubCutaneous every 8 hours  hydrALAZINE 37.5 milliGRAM(s) Oral every 8 hours  insulin glargine Injectable (LANTUS) 45 Unit(s) SubCutaneous at bedtime  insulin glargine Injectable (LANTUS) 25 Unit(s) SubCutaneous every morning  insulin lispro (ADMELOG) corrective regimen sliding scale   SubCutaneous at bedtime  insulin lispro (ADMELOG) corrective regimen sliding scale   SubCutaneous three times a day before meals  insulin lispro Injectable (ADMELOG) 16 Unit(s) SubCutaneous three times a day before meals  levothyroxine 50 MICROGram(s) Oral daily  melatonin 3 milliGRAM(s) Oral at bedtime  metoprolol succinate  milliGRAM(s) Oral every 12 hours  senna 2 Tablet(s) Oral at bedtime      Physical Exam  General: Patient comfortable in bed  Vital Signs Last 12 Hrs  T(F): 97.9 (02-01-21 @ 11:14), Max: 97.9 (02-01-21 @ 11:14)  HR: 78 (02-01-21 @ 11:14) (75 - 78)  BP: 104/59 (02-01-21 @ 11:14) (102/68 - 104/59)  BP(mean): --  RR: 18 (02-01-21 @ 11:14) (18 - 18)  SpO2: 99% (02-01-21 @ 11:14) (97% - 99%)  Neck: No palpable thyroid nodules.  CVS: S1S2, No murmurs  Respiratory: No wheezing, no crepitations  GI: Abdomen soft, bowel sounds positive  Musculoskeletal:  edema lower extremities.   Skin: No skin rashes, no ecchymosis    Diagnostics             Chief complaint  Patient is a 75y old  Female who presents with a chief complaint of increased work of breathing (01 Feb 2021 10:51)   Review of systems  Patient in bed, looks comfortable, no hypoglycemic episodes.    Labs and Fingersticks  CAPILLARY BLOOD GLUCOSE      POCT Blood Glucose.: 185 mg/dL (01 Feb 2021 11:49)  POCT Blood Glucose.: 89 mg/dL (01 Feb 2021 07:31)  POCT Blood Glucose.: 250 mg/dL (31 Jan 2021 21:01)  POCT Blood Glucose.: 118 mg/dL (31 Jan 2021 16:23)      Anion Gap, Serum: 17 (02-01 @ 06:04)  Anion Gap, Serum: 17 (01-31 @ 07:29)      Calcium, Total Serum: 9.5 (02-01 @ 06:04)  Calcium, Total Serum: 9.4 (01-31 @ 07:29)          02-01    136  |  102  |  111<H>  ----------------------------<  94  3.8   |  17<L>  |  1.97<H>    Ca    9.5      01 Feb 2021 06:04  Phos  4.8     02-01  Mg     2.6     02-01                          10.0   11.55 )-----------( 522      ( 01 Feb 2021 12:50 )             32.3     Medications  MEDICATIONS  (STANDING):  aspirin enteric coated 81 milliGRAM(s) Oral daily  atorvastatin 80 milliGRAM(s) Oral at bedtime  buMETAnide 2 milliGRAM(s) Oral <User Schedule>  buMETAnide 1 milliGRAM(s) Oral <User Schedule>  clopidogrel Tablet 75 milliGRAM(s) Oral daily  dextrose 40% Gel 15 Gram(s) Oral once  dextrose 5%. 1000 milliLiter(s) (50 mL/Hr) IV Continuous <Continuous>  dextrose 5%. 1000 milliLiter(s) (100 mL/Hr) IV Continuous <Continuous>  dextrose 50% Injectable 25 Gram(s) IV Push once  dextrose 50% Injectable 12.5 Gram(s) IV Push once  dextrose 50% Injectable 25 Gram(s) IV Push once  glucagon  Injectable 1 milliGRAM(s) IntraMuscular once  heparin   Injectable 5000 Unit(s) SubCutaneous every 8 hours  hydrALAZINE 37.5 milliGRAM(s) Oral every 8 hours  insulin glargine Injectable (LANTUS) 45 Unit(s) SubCutaneous at bedtime  insulin glargine Injectable (LANTUS) 25 Unit(s) SubCutaneous every morning  insulin lispro (ADMELOG) corrective regimen sliding scale   SubCutaneous at bedtime  insulin lispro (ADMELOG) corrective regimen sliding scale   SubCutaneous three times a day before meals  insulin lispro Injectable (ADMELOG) 16 Unit(s) SubCutaneous three times a day before meals  levothyroxine 50 MICROGram(s) Oral daily  melatonin 3 milliGRAM(s) Oral at bedtime  metoprolol succinate  milliGRAM(s) Oral every 12 hours  senna 2 Tablet(s) Oral at bedtime      Physical Exam  General: Patient comfortable in bed  Vital Signs Last 12 Hrs  T(F): 97.9 (02-01-21 @ 11:14), Max: 97.9 (02-01-21 @ 11:14)  HR: 78 (02-01-21 @ 11:14) (75 - 78)  BP: 104/59 (02-01-21 @ 11:14) (102/68 - 104/59)  BP(mean): --  RR: 18 (02-01-21 @ 11:14) (18 - 18)  SpO2: 99% (02-01-21 @ 11:14) (97% - 99%)  Neck: No palpable thyroid nodules.  CVS: S1S2, No murmurs  Respiratory: No wheezing, no crepitations  GI: Abdomen soft, bowel sounds positive  Musculoskeletal:  edema lower extremities.   Skin: No skin rashes, no ecchymosis    Diagnostics

## 2021-02-01 NOTE — PROGRESS NOTE ADULT - ASSESSMENT
Assessment  DMT2: 75y Female with DM T2 with hyperglycemia, A1C 7.1%, was on insulin at home, now on basal bolus insulin, blood sugars trending down this AM/trending within otherwise acceptable range, no hypoglycemic episodes, eating inconsistent meals, RD eval reviewed.  HF: on medications, stable, monitored, FU Cardiology.  Hypothyroidism: On Synthroid 50 mcg po daily, compliant with Synthroid intake, asymptomatic, euthyroid.  CKD: Monitor labs/BMP      Jillian Banks MD  Cell: [phone]  Office: [phone]       Assessment  DMT2: 75y Female with DM T2 with hyperglycemia, A1C 7.1%, was on insulin at home, now on basal bolus insulin,  blood sugars trending down this AM/trending within otherwise acceptable range, no hypoglycemic episodes, eating inconsistent meals, RD eval reviewed.  HF: on medications, stable, monitored, FU Cardiology.  Hypothyroidism: On Synthroid 50 mcg po daily, compliant with Synthroid intake, asymptomatic, euthyroid.  CKD: Monitor labs/BMP      Jillian Banks MD  Cell: [phone]  Office: [phone]

## 2021-02-01 NOTE — PROGRESS NOTE ADULT - SUBJECTIVE AND OBJECTIVE BOX
S: Resting comfortably, denies chest pain or SOB. Review of systems otherwise (-)    Review of Systems:   Constitutional: [ ] fevers, [ ] chills.   Skin: [ ] dry skin. [ ] rashes.  Psychiatric: [ ] depression, [ ] anxiety.   Gastrointestinal: [ ] BRBPR, [ ] melena.   Neurological: [ ] confusion. [ ] seizures. [ ] shuffling gait.   Ears,Nose,Mouth and Throat: [ ] ear pain [ ] sore throat.   Eyes: [ ] diplopia.   Respiratory: [ ] hemoptysis. [ ] shortness of breath  Cardiovascular: See HPI above  Hematologic/Lymphatic: [ ] anemia. [ ] painful nodes. [ ] prolonged bleeding.   Genitourinary: [ ] hematuria. [ ] flank pain.   Endocrine: [ ] significant change in weight. [ ] intolerance to heat and cold.     Review of systems [ x] otherwise negative, [ ] otherwise unable to obtain    FH: no family history of sudden cardiac death in first degree relatives    SH: [ ] tobacco, [ ] alcohol, [ ] drugs          MEDICATIONS  (STANDING):  aspirin enteric coated 81 milliGRAM(s) Oral daily  atorvastatin 80 milliGRAM(s) Oral at bedtime  buMETAnide 1 milliGRAM(s) Oral <User Schedule>  buMETAnide 2 milliGRAM(s) Oral <User Schedule>  clopidogrel Tablet 75 milliGRAM(s) Oral daily  dextrose 40% Gel 15 Gram(s) Oral once  dextrose 5%. 1000 milliLiter(s) (100 mL/Hr) IV Continuous <Continuous>  dextrose 5%. 1000 milliLiter(s) (50 mL/Hr) IV Continuous <Continuous>  dextrose 50% Injectable 25 Gram(s) IV Push once  dextrose 50% Injectable 12.5 Gram(s) IV Push once  dextrose 50% Injectable 25 Gram(s) IV Push once  glucagon  Injectable 1 milliGRAM(s) IntraMuscular once  heparin   Injectable 5000 Unit(s) SubCutaneous every 8 hours  hydrALAZINE 37.5 milliGRAM(s) Oral every 8 hours  insulin glargine Injectable (LANTUS) 25 Unit(s) SubCutaneous every morning  insulin glargine Injectable (LANTUS) 50 Unit(s) SubCutaneous at bedtime  insulin lispro (ADMELOG) corrective regimen sliding scale   SubCutaneous three times a day before meals  insulin lispro (ADMELOG) corrective regimen sliding scale   SubCutaneous at bedtime  insulin lispro Injectable (ADMELOG) 16 Unit(s) SubCutaneous three times a day before meals  levothyroxine 50 MICROGram(s) Oral daily  melatonin 3 milliGRAM(s) Oral at bedtime  metoprolol succinate  milliGRAM(s) Oral every 12 hours  senna 2 Tablet(s) Oral at bedtime    MEDICATIONS  (PRN):  ALPRAZolam 0.25 milliGRAM(s) Oral every 8 hours PRN anxiety  cyclobenzaprine 5 milliGRAM(s) Oral three times a day PRN Muscle Spasm  polyethylene glycol 3350 17 Gram(s) Oral two times a day PRN Constipation      LABS:                          8.7    11.60 )-----------( 489      ( 01 Feb 2021 06:04 )             28.1     Hemoglobin: 8.7 g/dL (02-01 @ 06:04)  Hemoglobin: 9.6 g/dL (01-31 @ 07:29)  Hemoglobin: 11.4 g/dL (01-30 @ 08:53)  Hemoglobin: 10.5 g/dL (01-29 @ 07:05)  Hemoglobin: 10.7 g/dL (01-28 @ 06:30)    02-01    136  |  102  |  111<H>  ----------------------------<  94  3.8   |  17<L>  |  1.97<H>    Ca    9.5      01 Feb 2021 06:04  Phos  4.8     02-01  Mg     2.6     02-01      Creatinine Trend: 1.97<--, 1.98<--, 1.97<--, 2.04<--, 2.13<--, 2.14<--             01-31-21 @ 07:01  -  02-01-21 @ 07:00  --------------------------------------------------------  IN: 520 mL / OUT: 350 mL / NET: 170 mL    02-01-21 @ 07:01  -  02-01-21 @ 10:52  --------------------------------------------------------  IN: 240 mL / OUT: 0 mL / NET: 240 mL        PHYSICAL EXAM  Vital Signs Last 24 Hrs  T(C): 36.2 (01 Feb 2021 04:49), Max: 36.5 (31 Jan 2021 20:04)  T(F): 97.2 (01 Feb 2021 04:49), Max: 97.7 (31 Jan 2021 20:04)  HR: 75 (01 Feb 2021 04:49) (75 - 83)  BP: 102/68 (01 Feb 2021 04:49) (99/61 - 110/65)  BP(mean): --  RR: 18 (01 Feb 2021 04:49) (18 - 18)  SpO2: 97% (01 Feb 2021 04:49) (95% - 99%)      General: Well nourished in no acute distress. Alert and Oriented * 3.   Head: Normocephalic and atraumatic.   Neck: No JVD. No bruits. Supple. Does not appear to be enlarged.   Cardiovascular: + S1,S2 ; RRR Soft systolic murmur at the left lower sternal border. No rubs noted.    Lungs: CTA b/l. No rhonchi, rales or wheezes.   Abdomen: + BS, soft. Non tender. Non distended. No rebound. No guarding.   Extremities: No clubbing/cyanosis/edema.   Neurologic: Moves all four extremities. Full range of motion.   Skin: Warm and moist. The patient's skin has normal elasticity and good skin turgor.   Psychiatric: Appropriate mood and affect.  Musculoskeletal: Normal range of motion, normal strength    TELEMETRY: SR 70-80    < from: Cardiac Cath Lab - Adult (01.20.21 @ 10:22) >  PROCEDURE:  --  Right heart catheterization.  --  Sonosite - Diagnostic.  COMPLICATIONS: There were no complications.  DIAGNOSTIC RECOMMENDATIONS: The patient should continuewith the present  medications.  Prepared and signed by  Cesar Jackson M.D.       A/P) 74 y/o female PMH HTN, HLD, DM, CAD s/p CABG 2014 and prior PCI, severe mitral regurgitation (s/p mitral clip 2019), pulmonary HTN, HF (EF 21 % June 2019), CKD IV not on dialysis (b/l SCr 2-2.2), hypothyroidism a/w acute on chronic systolic CHF with NYHA IV functional status.    -Volume status improved - continue PO Bumex  -Continue dapt/statin   -CT C spine noted with no fracture, degenerative changes  -Repeat echo noted but didn't reassess degree of mitral stenosis  -RHC noted above - Filling pressures are appropriate considering LV function and the mitral valve gradient  -Continue Toprol XL, tolerating hydralazine  -very poor candidate for a primary prevention ICD  -no further inpatient cardiac w/u planned  -D/C planning per primary team  -f/u with Dr. Sotelo on Friday 2/5 at 2:20 PM    Amauri Hill PA-C  Pager: [phone]

## 2021-02-01 NOTE — PROGRESS NOTE ADULT - SUBJECTIVE AND OBJECTIVE BOX
[facility]   MD Dion Dasilva MD, D.O. Ruoru Wong, PA    From 7 AM - 5 PM:  OFFICE: [phone]  Dr. Muhammad cell: [phone]  Dr. Montero cell: [phone]  Dr. Soria cell: [phone]  RASHIDA Smith cell: [phone]    From 5 PM - 7 AM: Answering Service: [phone]  Date of service: 02-01-21 @ 14:18    RENAL FOLLOW UP NOTE  --------------------------------------------------------------------------------  HPI:  Pt seen and examined at bedside.   Denies SOB, chest pain     PAST HISTORY  --------------------------------------------------------------------------------  No significant changes to PMH, PSH, FHx, SHx, unless otherwise noted    ALLERGIES & MEDICATIONS  --------------------------------------------------------------------------------  Allergies    azithromycin (Hives; Pruritus)    Intolerances      Standing Inpatient Medications  aspirin enteric coated 81 milliGRAM(s) Oral daily  atorvastatin 80 milliGRAM(s) Oral at bedtime  buMETAnide 2 milliGRAM(s) Oral <User Schedule>  buMETAnide 1 milliGRAM(s) Oral <User Schedule>  clopidogrel Tablet 75 milliGRAM(s) Oral daily  dextrose 40% Gel 15 Gram(s) Oral once  dextrose 5%. 1000 milliLiter(s) IV Continuous <Continuous>  dextrose 5%. 1000 milliLiter(s) IV Continuous <Continuous>  dextrose 50% Injectable 25 Gram(s) IV Push once  dextrose 50% Injectable 12.5 Gram(s) IV Push once  dextrose 50% Injectable 25 Gram(s) IV Push once  glucagon  Injectable 1 milliGRAM(s) IntraMuscular once  heparin   Injectable 5000 Unit(s) SubCutaneous every 8 hours  hydrALAZINE 37.5 milliGRAM(s) Oral every 8 hours  insulin glargine Injectable (LANTUS) 45 Unit(s) SubCutaneous at bedtime  insulin glargine Injectable (LANTUS) 25 Unit(s) SubCutaneous every morning  insulin lispro (ADMELOG) corrective regimen sliding scale   SubCutaneous at bedtime  insulin lispro (ADMELOG) corrective regimen sliding scale   SubCutaneous three times a day before meals  insulin lispro Injectable (ADMELOG) 16 Unit(s) SubCutaneous three times a day before meals  levothyroxine 50 MICROGram(s) Oral daily  melatonin 3 milliGRAM(s) Oral at bedtime  metoprolol succinate  milliGRAM(s) Oral every 12 hours  senna 2 Tablet(s) Oral at bedtime    PRN Inpatient Medications  ALPRAZolam 0.25 milliGRAM(s) Oral every 8 hours PRN  cyclobenzaprine 5 milliGRAM(s) Oral three times a day PRN  polyethylene glycol 3350 17 Gram(s) Oral two times a day PRN      REVIEW OF SYSTEMS  --------------------------------------------------------------------------------  General: no fever  CVS: no chest pain  RESP: no sob, no cough  ABD: no abdominal pain  : no dysuria,  MSK: no edema     VITALS/PHYSICAL EXAM  --------------------------------------------------------------------------------  T(C): 36.6 (02-01-21 @ 11:14), Max: 36.6 (02-01-21 @ 11:14)  HR: 78 (02-01-21 @ 11:14) (75 - 83)  BP: 104/59 (02-01-21 @ 11:14) (99/61 - 110/65)  RR: 18 (02-01-21 @ 11:14) (18 - 18)  SpO2: 99% (02-01-21 @ 11:14) (97% - 99%)  Wt(kg): --        01-31-21 @ 07:01  -  02-01-21 @ 07:00  --------------------------------------------------------  IN: 520 mL / OUT: 350 mL / NET: 170 mL    02-01-21 @ 07:01  -  02-01-21 @ 14:18  --------------------------------------------------------  IN: 240 mL / OUT: 0 mL / NET: 240 mL      Physical Exam:  	Gen: NAD  	HEENT: MMM  	Pulm: CTA B/L  	CV: S1S2  	Abd: Soft, +BS  	Ext: No LE edema B/L                      Neuro: Awake   	Skin: Warm and Dry   	Vascular access:    LABS/STUDIES  --------------------------------------------------------------------------------              10.0   11.55 >-----------<  522      [02-01-21 @ 12:50]              32.3     136  |  102  |  111  ----------------------------<  94      [02-01-21 @ 06:04]  3.8   |  17  |  1.97        Ca     9.5     [02-01-21 @ 06:04]      Mg     2.6     [02-01-21 @ 06:04]      Phos  4.8     [02-01-21 @ 06:04]    Creatinine Trend:  SCr 1.97 [02-01 @ 06:04]  SCr 1.98 [01-31 @ 07:29]  SCr 1.97 [01-30 @ 08:53]  SCr 2.04 [01-29 @ 07:10]  SCr 2.13 [01-28 @ 07:47]    Urinalysis - [01-22-21 @ 22:32]      Color Light Yellow / Appearance Clear / SG 1.011 / pH 5.5      Gluc Trace / Ketone Negative  / Bili Negative / Urobili Negative       Blood Negative / Protein 30 mg/dL / Leuk Est Large / Nitrite Negative      RBC 2 / WBC 35 / Hyaline 1 / Gran  / Sq Epi  / Non Sq Epi 2 / Bacteria Negative      Ferritin 111      [01-13-21 @ 01:42]  HbA1c 7.5      [10-08-19 @ 14:30]  TSH 2.15      [01-13-21 @ 10:07]

## 2021-02-01 NOTE — PROGRESS NOTE ADULT - ASSESSMENT
76 yo F w/ PMH of CKD stage 4 (baseline Cr 1.9-2.2) HTN, T2DM, CAD s/p CABG X3 (2014 at [facility]), non-dilated ICM (EF 20-25%), severe mitral regurgitation s/p mitral clip (6/13/19), severe pulm HTN, hypothyroidism who presented for evaluation of SOB and was admitted for ADHF.    CKD stage 4  - baseline Cr 1.9-2.2; has had recurrent MERVIN's over the years  - CKD is likely 2/2 recurrent MERVIN's + underlying DM/HTN  - Scr stable today. BUN elevated due to diuretics.   - Pt on oral diuretics. Diuretics per heart failure team   - Avoid hypotension   - renal sonogram in the past with simple renal cyst  - d/w daughter and son-in-law on 01/23 about worsening renal function and if no improvement likely need for dialysis. They were also informed that she will be a poor candidate for HD in view of CHF. They understand but wants HD if needed. Consent for HD is in chart.  - She is not uremic, monitor at present  - Avoid nephrotoxins including NSAIDs, IV contrast (if possible), [employer] products  - monitor I/O's accurately    HTN  BP controlled  Low salt diet   MOnitor BP    Proteinuria/Hematuria  - likely from underlying DM  - has 1.8 grams proteinuria  - Hep B, Hep C, HIV RF, C3, C4, p-ANCA, c-ANCA, RPR neg  - weak gamma-migrating protein, weak IgG lambda protein band noted in the past. Pt to follow w/ heme   - DEAN 1:320 positive  - RPR neg, FTA +

## 2021-02-01 NOTE — PROGRESS NOTE ADULT - PROBLEM SELECTOR PLAN 3
Raised urticarial rash over UE and back   -Derm consulted, recs as below:  -clobetasol 0.05% ointment BID   -permethrin 5% cream, apply to whole body from neck down at bedtime x 1 night and rinse off in morning (8-10 hours later). Repeat again in 1 week  -symptoms improved

## 2021-02-01 NOTE — PROGRESS NOTE ADULT - ATTENDING COMMENTS
Patient seen and examined    VSS. no JVD or LE edema. Breathing markedly improved.     Cr stable. FSG improving, endo recs appreciated.     DC planning, awaiting rehab.

## 2021-02-01 NOTE — PROGRESS NOTE ADULT - SUBJECTIVE AND OBJECTIVE BOX
Stevo Hewitt MD, PGY-2  Available on [employer] Teams | Pager: [phone] | [facility]: [phone]  ---------------------------------------------------------------------------------------------  Patient is a 75y old  Female who presents with a chief complaint of increased work of breathing (31 Jan 2021 15:18)    SUBJECTIVE / OVERNIGHT EVENTS: No acute events overnight. Pt seen and examined at bedside. Pt complaining of shortness of breath and hip pain.     MEDICATIONS  (STANDING):  aspirin enteric coated 81 milliGRAM(s) Oral daily  atorvastatin 80 milliGRAM(s) Oral at bedtime  buMETAnide 2 milliGRAM(s) Oral <User Schedule>  buMETAnide 1 milliGRAM(s) Oral <User Schedule>  clopidogrel Tablet 75 milliGRAM(s) Oral daily  dextrose 40% Gel 15 Gram(s) Oral once  dextrose 5%. 1000 milliLiter(s) (50 mL/Hr) IV Continuous <Continuous>  dextrose 5%. 1000 milliLiter(s) (100 mL/Hr) IV Continuous <Continuous>  dextrose 50% Injectable 25 Gram(s) IV Push once  dextrose 50% Injectable 12.5 Gram(s) IV Push once  dextrose 50% Injectable 25 Gram(s) IV Push once  glucagon  Injectable 1 milliGRAM(s) IntraMuscular once  heparin   Injectable 5000 Unit(s) SubCutaneous every 8 hours  hydrALAZINE 37.5 milliGRAM(s) Oral every 8 hours  insulin glargine Injectable (LANTUS) 50 Unit(s) SubCutaneous at bedtime  insulin glargine Injectable (LANTUS) 25 Unit(s) SubCutaneous every morning  insulin lispro (ADMELOG) corrective regimen sliding scale   SubCutaneous three times a day before meals  insulin lispro (ADMELOG) corrective regimen sliding scale   SubCutaneous at bedtime  insulin lispro Injectable (ADMELOG) 16 Unit(s) SubCutaneous three times a day before meals  levothyroxine 50 MICROGram(s) Oral daily  melatonin 3 milliGRAM(s) Oral at bedtime  metoprolol succinate  milliGRAM(s) Oral every 12 hours  senna 2 Tablet(s) Oral at bedtime    MEDICATIONS  (PRN):  ALPRAZolam 0.25 milliGRAM(s) Oral every 8 hours PRN anxiety  cyclobenzaprine 5 milliGRAM(s) Oral three times a day PRN Muscle Spasm  polyethylene glycol 3350 17 Gram(s) Oral two times a day PRN Constipation      CAPILLARY BLOOD GLUCOSE      POCT Blood Glucose.: 89 mg/dL (01 Feb 2021 07:31)  POCT Blood Glucose.: 250 mg/dL (31 Jan 2021 21:01)  POCT Blood Glucose.: 118 mg/dL (31 Jan 2021 16:23)  POCT Blood Glucose.: 194 mg/dL (31 Jan 2021 11:36)    I&O's Summary    31 Jan 2021 07:01  -  01 Feb 2021 07:00  --------------------------------------------------------  IN: 520 mL / OUT: 350 mL / NET: 170 mL        PHYSICAL EXAM:  Vital Signs Last 24 Hrs  T(C): 36.2 (01 Feb 2021 04:49), Max: 36.5 (31 Jan 2021 20:04)  T(F): 97.2 (01 Feb 2021 04:49), Max: 97.7 (31 Jan 2021 20:04)  HR: 75 (01 Feb 2021 04:49) (75 - 83)  BP: 102/68 (01 Feb 2021 04:49) (99/61 - 110/65)  RR: 18 (01 Feb 2021 04:49) (18 - 18)  SpO2: 97% (01 Feb 2021 04:49) (95% - 99%)    GENERAL: tachypneic  HEAD:  Atraumatic, Normocephalic  EYES: EOMI, PERRLA, conjunctiva and sclera clear  NECK: Supple, No JVD  CHEST/LUNG: tachypneic, appears short of breath, shallow breathing, cta anteriorly  HEART: Regular rate and rhythm; No murmurs, rubs, or gallops  ABDOMEN: Soft, Nontender, Nondistended; Bowel sounds present  EXTREMITIES:  2+ Peripheral Pulses, No clubbing, cyanosis, or lower extremity edema, wwp  PSYCH: AAOx3  NEUROLOGY: non-focal  SKIN: No rashes or lesions    LABS:                        8.7    11.60 )-----------( 489      ( 01 Feb 2021 06:04 )             28.1     02-01    136  |  102  |  111<H>  ----------------------------<  94  3.8   |  17<L>  |  1.97<H>    Ca    9.5      01 Feb 2021 06:04  Phos  4.8     02-01  Mg     2.6     02-01    RADIOLOGY & ADDITIONAL TESTS:  Results Reviewed:   Imaging Personally Reviewed:  Electrocardiogram Personally Reviewed:    COORDINATION OF CARE:  Care Discussed with Consultants/Other Providers [Y/N]:  Prior or Outpatient Records Reviewed [Y/N]:

## 2021-02-02 ENCOUNTER — TRANSCRIPTION ENCOUNTER (OUTPATIENT)
Age: 76
End: 2021-02-02

## 2021-02-02 VITALS
RESPIRATION RATE: 18 BRPM | SYSTOLIC BLOOD PRESSURE: 108 MMHG | HEART RATE: 79 BPM | OXYGEN SATURATION: 96 % | TEMPERATURE: 98 F | DIASTOLIC BLOOD PRESSURE: 64 MMHG

## 2021-02-02 DIAGNOSIS — N18.4 CHRONIC KIDNEY DISEASE, STAGE 4 (SEVERE): ICD-10-CM

## 2021-02-02 LAB
ANION GAP SERPL CALC-SCNC: 18 MMOL/L — HIGH (ref 5–17)
BUN SERPL-MCNC: 103 MG/DL — HIGH (ref 7–23)
CALCIUM SERPL-MCNC: 9.6 MG/DL — SIGNIFICANT CHANGE UP (ref 8.4–10.5)
CHLORIDE SERPL-SCNC: 101 MMOL/L — SIGNIFICANT CHANGE UP (ref 96–108)
CO2 SERPL-SCNC: 18 MMOL/L — LOW (ref 22–31)
CREAT SERPL-MCNC: 1.89 MG/DL — HIGH (ref 0.5–1.3)
GLUCOSE BLDC GLUCOMTR-MCNC: 118 MG/DL — HIGH (ref 70–99)
GLUCOSE BLDC GLUCOMTR-MCNC: 160 MG/DL — HIGH (ref 70–99)
GLUCOSE BLDC GLUCOMTR-MCNC: 72 MG/DL — SIGNIFICANT CHANGE UP (ref 70–99)
GLUCOSE SERPL-MCNC: 117 MG/DL — HIGH (ref 70–99)
HCT VFR BLD CALC: 27.4 % — LOW (ref 34.5–45)
HGB BLD-MCNC: 8.3 G/DL — LOW (ref 11.5–15.5)
MAGNESIUM SERPL-MCNC: 2.5 MG/DL — SIGNIFICANT CHANGE UP (ref 1.6–2.6)
MCHC RBC-ENTMCNC: 25.5 PG — LOW (ref 27–34)
MCHC RBC-ENTMCNC: 30.3 GM/DL — LOW (ref 32–36)
MCV RBC AUTO: 84.3 FL — SIGNIFICANT CHANGE UP (ref 80–100)
NRBC # BLD: 0 /100 WBCS — SIGNIFICANT CHANGE UP (ref 0–0)
PHOSPHATE SERPL-MCNC: 4.7 MG/DL — HIGH (ref 2.5–4.5)
PLATELET # BLD AUTO: 474 K/UL — HIGH (ref 150–400)
POTASSIUM SERPL-MCNC: 3.7 MMOL/L — SIGNIFICANT CHANGE UP (ref 3.5–5.3)
POTASSIUM SERPL-SCNC: 3.7 MMOL/L — SIGNIFICANT CHANGE UP (ref 3.5–5.3)
RBC # BLD: 3.25 M/UL — LOW (ref 3.8–5.2)
RBC # FLD: 16.1 % — HIGH (ref 10.3–14.5)
SODIUM SERPL-SCNC: 137 MMOL/L — SIGNIFICANT CHANGE UP (ref 135–145)
WBC # BLD: 11.03 K/UL — HIGH (ref 3.8–10.5)
WBC # FLD AUTO: 11.03 K/UL — HIGH (ref 3.8–10.5)

## 2021-02-02 PROCEDURE — 93923 UPR/LXTR ART STDY 3+ LVLS: CPT

## 2021-02-02 PROCEDURE — 93321 DOPPLER ECHO F-UP/LMTD STD: CPT

## 2021-02-02 PROCEDURE — 83036 HEMOGLOBIN GLYCOSYLATED A1C: CPT

## 2021-02-02 PROCEDURE — 99291 CRITICAL CARE FIRST HOUR: CPT | Mod: 25

## 2021-02-02 PROCEDURE — 97161 PT EVAL LOW COMPLEX 20 MIN: CPT

## 2021-02-02 PROCEDURE — C1769: CPT

## 2021-02-02 PROCEDURE — 97530 THERAPEUTIC ACTIVITIES: CPT

## 2021-02-02 PROCEDURE — 85025 COMPLETE CBC W/AUTO DIFF WBC: CPT

## 2021-02-02 PROCEDURE — C8929: CPT

## 2021-02-02 PROCEDURE — 87449 NOS EACH ORGANISM AG IA: CPT

## 2021-02-02 PROCEDURE — 96374 THER/PROPH/DIAG INJ IV PUSH: CPT

## 2021-02-02 PROCEDURE — 93970 EXTREMITY STUDY: CPT

## 2021-02-02 PROCEDURE — 71250 CT THORAX DX C-: CPT

## 2021-02-02 PROCEDURE — 84132 ASSAY OF SERUM POTASSIUM: CPT

## 2021-02-02 PROCEDURE — U0005: CPT

## 2021-02-02 PROCEDURE — 82803 BLOOD GASES ANY COMBINATION: CPT

## 2021-02-02 PROCEDURE — 97110 THERAPEUTIC EXERCISES: CPT

## 2021-02-02 PROCEDURE — 87640 STAPH A DNA AMP PROBE: CPT

## 2021-02-02 PROCEDURE — C8924: CPT

## 2021-02-02 PROCEDURE — 82435 ASSAY OF BLOOD CHLORIDE: CPT

## 2021-02-02 PROCEDURE — 84443 ASSAY THYROID STIM HORMONE: CPT

## 2021-02-02 PROCEDURE — 93005 ELECTROCARDIOGRAM TRACING: CPT

## 2021-02-02 PROCEDURE — 82947 ASSAY GLUCOSE BLOOD QUANT: CPT

## 2021-02-02 PROCEDURE — 99233 SBSQ HOSP IP/OBS HIGH 50: CPT

## 2021-02-02 PROCEDURE — 71045 X-RAY EXAM CHEST 1 VIEW: CPT

## 2021-02-02 PROCEDURE — 86140 C-REACTIVE PROTEIN: CPT

## 2021-02-02 PROCEDURE — 82962 GLUCOSE BLOOD TEST: CPT

## 2021-02-02 PROCEDURE — 80048 BASIC METABOLIC PNL TOTAL CA: CPT

## 2021-02-02 PROCEDURE — 85730 THROMBOPLASTIN TIME PARTIAL: CPT

## 2021-02-02 PROCEDURE — 85014 HEMATOCRIT: CPT

## 2021-02-02 PROCEDURE — 85379 FIBRIN DEGRADATION QUANT: CPT

## 2021-02-02 PROCEDURE — 87186 SC STD MICRODIL/AGAR DIL: CPT

## 2021-02-02 PROCEDURE — 85018 HEMOGLOBIN: CPT

## 2021-02-02 PROCEDURE — 83735 ASSAY OF MAGNESIUM: CPT

## 2021-02-02 PROCEDURE — 86769 SARS-COV-2 COVID-19 ANTIBODY: CPT

## 2021-02-02 PROCEDURE — 82330 ASSAY OF CALCIUM: CPT

## 2021-02-02 PROCEDURE — 84100 ASSAY OF PHOSPHORUS: CPT

## 2021-02-02 PROCEDURE — 94660 CPAP INITIATION&MGMT: CPT

## 2021-02-02 PROCEDURE — 83880 ASSAY OF NATRIURETIC PEPTIDE: CPT

## 2021-02-02 PROCEDURE — 0225U NFCT DS DNA&RNA 21 SARSCOV2: CPT

## 2021-02-02 PROCEDURE — 87086 URINE CULTURE/COLONY COUNT: CPT

## 2021-02-02 PROCEDURE — 93451 RIGHT HEART CATH: CPT

## 2021-02-02 PROCEDURE — 84145 PROCALCITONIN (PCT): CPT

## 2021-02-02 PROCEDURE — 87040 BLOOD CULTURE FOR BACTERIA: CPT

## 2021-02-02 PROCEDURE — 84295 ASSAY OF SERUM SODIUM: CPT

## 2021-02-02 PROCEDURE — 83605 ASSAY OF LACTIC ACID: CPT

## 2021-02-02 PROCEDURE — 99152 MOD SED SAME PHYS/QHP 5/>YRS: CPT

## 2021-02-02 PROCEDURE — 82728 ASSAY OF FERRITIN: CPT

## 2021-02-02 PROCEDURE — 84484 ASSAY OF TROPONIN QUANT: CPT

## 2021-02-02 PROCEDURE — U0003: CPT

## 2021-02-02 PROCEDURE — 99239 HOSP IP/OBS DSCHRG MGMT >30: CPT

## 2021-02-02 PROCEDURE — 85027 COMPLETE CBC AUTOMATED: CPT

## 2021-02-02 PROCEDURE — 80053 COMPREHEN METABOLIC PANEL: CPT

## 2021-02-02 PROCEDURE — 72125 CT NECK SPINE W/O DYE: CPT

## 2021-02-02 PROCEDURE — 85610 PROTHROMBIN TIME: CPT

## 2021-02-02 PROCEDURE — C1894: CPT

## 2021-02-02 PROCEDURE — 81001 URINALYSIS AUTO W/SCOPE: CPT

## 2021-02-02 PROCEDURE — 87641 MR-STAPH DNA AMP PROBE: CPT

## 2021-02-02 RX ORDER — METOPROLOL TARTRATE 50 MG
1 TABLET ORAL
Qty: 0 | Refills: 0 | DISCHARGE
Start: 2021-02-02

## 2021-02-02 RX ORDER — LEVOTHYROXINE SODIUM 125 MCG
1 TABLET ORAL
Qty: 0 | Refills: 0 | DISCHARGE
Start: 2021-02-02

## 2021-02-02 RX ORDER — INSULIN LISPRO 100/ML
16 VIAL (ML) SUBCUTANEOUS
Qty: 0 | Refills: 0 | DISCHARGE
Start: 2021-02-02

## 2021-02-02 RX ORDER — HYDRALAZINE HCL 50 MG
37.5 TABLET ORAL
Qty: 0 | Refills: 0 | DISCHARGE
Start: 2021-02-02

## 2021-02-02 RX ORDER — INSULIN LISPRO 100/ML
18 VIAL (ML) SUBCUTANEOUS
Qty: 0 | Refills: 0 | DISCHARGE

## 2021-02-02 RX ORDER — ALPRAZOLAM 0.25 MG
1 TABLET ORAL
Qty: 0 | Refills: 0 | DISCHARGE
Start: 2021-02-02

## 2021-02-02 RX ORDER — BUMETANIDE 0.25 MG/ML
1 INJECTION INTRAMUSCULAR; INTRAVENOUS
Qty: 0 | Refills: 0 | DISCHARGE
Start: 2021-02-02

## 2021-02-02 RX ORDER — HYDROXYZINE HCL 10 MG
1 TABLET ORAL
Qty: 0 | Refills: 0 | DISCHARGE

## 2021-02-02 RX ORDER — SODIUM BICARBONATE 1 MEQ/ML
650 SYRINGE (ML) INTRAVENOUS DAILY
Refills: 0 | Status: DISCONTINUED | OUTPATIENT
Start: 2021-02-02 | End: 2021-02-02

## 2021-02-02 RX ORDER — ENOXAPARIN SODIUM 100 MG/ML
60 INJECTION SUBCUTANEOUS
Qty: 0 | Refills: 0 | DISCHARGE

## 2021-02-02 RX ORDER — INSULIN GLARGINE 100 [IU]/ML
45 INJECTION, SOLUTION SUBCUTANEOUS
Qty: 0 | Refills: 0 | DISCHARGE
Start: 2021-02-02

## 2021-02-02 RX ORDER — LANOLIN ALCOHOL/MO/W.PET/CERES
1 CREAM (GRAM) TOPICAL
Qty: 0 | Refills: 0 | DISCHARGE
Start: 2021-02-02

## 2021-02-02 RX ORDER — INSULIN GLARGINE 100 [IU]/ML
25 INJECTION, SOLUTION SUBCUTANEOUS
Qty: 0 | Refills: 0 | DISCHARGE
Start: 2021-02-02

## 2021-02-02 RX ORDER — BUMETANIDE 0.25 MG/ML
1 INJECTION INTRAMUSCULAR; INTRAVENOUS
Qty: 0 | Refills: 0 | DISCHARGE

## 2021-02-02 RX ORDER — SODIUM BICARBONATE 1 MEQ/ML
1 SYRINGE (ML) INTRAVENOUS
Qty: 0 | Refills: 0 | DISCHARGE
Start: 2021-02-02

## 2021-02-02 RX ORDER — POLYETHYLENE GLYCOL 3350 17 G/17G
17 POWDER, FOR SOLUTION ORAL
Qty: 0 | Refills: 0 | DISCHARGE
Start: 2021-02-02

## 2021-02-02 RX ADMIN — Medication 37.5 MILLIGRAM(S): at 12:01

## 2021-02-02 RX ADMIN — Medication 16 UNIT(S): at 12:00

## 2021-02-02 RX ADMIN — Medication 2: at 11:59

## 2021-02-02 RX ADMIN — HEPARIN SODIUM 5000 UNIT(S): 5000 INJECTION INTRAVENOUS; SUBCUTANEOUS at 12:01

## 2021-02-02 RX ADMIN — INSULIN GLARGINE 25 UNIT(S): 100 INJECTION, SOLUTION SUBCUTANEOUS at 07:57

## 2021-02-02 RX ADMIN — CLOPIDOGREL BISULFATE 75 MILLIGRAM(S): 75 TABLET, FILM COATED ORAL at 12:01

## 2021-02-02 RX ADMIN — Medication 16 UNIT(S): at 07:55

## 2021-02-02 RX ADMIN — Medication 81 MILLIGRAM(S): at 12:01

## 2021-02-02 RX ADMIN — Medication 50 MICROGRAM(S): at 05:08

## 2021-02-02 RX ADMIN — HEPARIN SODIUM 5000 UNIT(S): 5000 INJECTION INTRAVENOUS; SUBCUTANEOUS at 05:08

## 2021-02-02 RX ADMIN — Medication 100 MILLIGRAM(S): at 05:08

## 2021-02-02 RX ADMIN — BUMETANIDE 2 MILLIGRAM(S): 0.25 INJECTION INTRAMUSCULAR; INTRAVENOUS at 05:08

## 2021-02-02 RX ADMIN — Medication 37.5 MILLIGRAM(S): at 06:53

## 2021-02-02 NOTE — PROGRESS NOTE ADULT - ASSESSMENT
75y.o F Belarusian speaking h/o HTN, HLD, DM, CAD s/p CABG 2014 and prior PCI, severe mitral regurgitation (s/p mitral clip 2019), pulmonary HTN (TTE 2019), HF (EF 21 % June 2019), CKD IV not on dialysis (b/l SCr 2-2.2), hypothyroidism admitted for hypoxic respiratory failure 2/2 acute on chronic HFrEF exacerbation in the setting of mitral stenosis c/b MERVIN on CKD.  Pt initially treated w/ IV diuresis but was started on milrinone drip after RHC showed persistent elevated filled pressures. Now s/p  IV diuresis, milrinone dosage decreased but is w/ continued dyspnea despite clinical euvolemic status.  PRN xanax started for possible anxiety component.  Labs w/ intermittent elevations in WBC in the setting of asymptomatic bacteruria, monitoring off abx.

## 2021-02-02 NOTE — PROGRESS NOTE ADULT - PROBLEM SELECTOR PLAN 1
- continue increase Bumex to 2mg in the AM and 1mg in the PM  - Increase Toprol XL to 100 mg BID; HR goal 60-70  - Increase HDZN to 37.5 mg q8h, hold for SBP <90  - daily standing weight and strict I&Os  - please add low dose spironolactone 12.5 mg daily   - arrange for discharge follow up at [facility] heart failure clinic CHENG Villar  within 2 weeks of d/c.

## 2021-02-02 NOTE — PROGRESS NOTE ADULT - PROBLEM SELECTOR PROBLEM 6
Airway  Performed by: Dewey Michael DO  Authorized by: Dewey Michael DO     Final Airway Type:  Endotracheal airway  Final Endotracheal Airway*:  ETT  ETT Size (mm)*:  7.0  Cuff*:  Regular  Technique Used for Successful ETT Placement:  Video laryngoscopy  Devices/Methods Used in Placement*:  Non-Rebreather  Intubation Procedure*:  Preoxygenation, ETCO2, Atraumatic, Dentition Unchanged and Phaynx Clear  Insertion Site:  Oral  Blade Type*:  Glidescope  Blade Size*:  3  Measured from*:  Lips  Secured at (cm)*:  20  Placement Verified by: auscultation and capnometry    Glottic View*:  1 - full view of glottis  Attempts*:  1  Ventilation Between Attempts:  None  Number of Other Approaches Attempted:  0   Patient Identified, Procedure confirmed, Emergency equipment available and Safety protocols followed  Location:  OR  Urgency:  Emergent  Difficult Airway: No    Indications for Airway Management:  Hypoxemia and respiratory failure  Sedation Level:  Anesthetized  Mask Difficulty Assessment:  0 - not attempted  Performed By:  Anesthesiologist   Pt with SpO2 90% on NRB upon entering room.  Pt on levo prior to intubation.  RSI intubation.  VSS throughout per nursing record.         Type 2 diabetes mellitus with diabetic nephropathy, with long-term current use of insulin Hypothyroidism, unspecified type

## 2021-02-02 NOTE — PROGRESS NOTE ADULT - ATTENDING COMMENTS
75yrs, CAD, LVEF 25%. s/p CABG   severe MR s/p clip June 2019. PHTN, DM, CKD Cr 1.3.   Admitted 1.12 with ADHF. bumex IV and bipap.   TTE 1.13: LVEDD 4.7, LVEF 20, normal RV size, decreased function. mean MV gradient 7-8 at HR 80.   RHC: 1.20: RA 15, RV 49/10, PA 50/21 32, PCWP 19. [place] 3.5/2.4  Patient treated with milrinone and diuretics at one point. milrione d/c 1.26  toprol uptitrated with the notion that post clip MS might be contributing to her presentation   Patient awaiting transfer to subacute.   meds: bumex 2 alt 1, HDZN 37.5 Q, toprol  Q12 asa plavix, statin.   79SR, 93/-105/, ? 10-15lb weight loss since admission.   02-02    137  |  101  |  103<H>  ----------------------------<  117<H>  3.7   |  18<L>  |  1.89<H>  Cr 1.9, 1.9,   Ca    9.6      02 Feb 2021 06:36  Phos  4.7     02-02  Mg     2.5     02-02                        8.3    11.03 )-----------( 474      ( 02 Feb 2021 06:36 )             27.4 75yrs, CAD, LVEF 25%. s/p CABG   severe MR s/p clip June 2019. PHTN, DM, CKD Cr 1.3.   Admitted 1.12 with ADHF. bumex IV and bipap.   TTE 1.13: LVEDD 4.7, LVEF 20, normal RV size, decreased function. mean MV gradient 7-8 at HR 80.   RHC: 1.20: RA 15, RV 49/10, PA 50/21 32, PCWP 19. Chapin 3.5/2.4  Patient treated with milrinone and diuretics at one point. milrione d/c 1.26  toprol uptitrated with the notion that post clip MS might be contributing to her presentation   Patient awaiting transfer to subacute.   meds: bumex 2 alt 1, HDZN 37.5 Q, toprol  Q12 asa plavix, statin.   79SR, 93/-105/, ? 10-15lb weight loss since admission.   02-02    137  |  101  |  103<H>  ----------------------------<  117<H>  3.7   |  18<L>  |  1.89<H>  Cr 1.9, 1.9,   Ca    9.6      02 Feb 2021 06:36  Phos  4.7     02-02  Mg     2.5     02-02                        8.3    11.03 )-----------( 474      ( 02 Feb 2021 06:36 )             27.4  patient appears overall clinically stable. could continue current medical regimen.   would add low dose aldactone 12.5  patient should follow with Dr Gregg in the HF clinic at [facility]. Please make appoint for CHENG Villar at that clinic within 2 weeks of d/c.   Good Loera 75yrs, CAD, LVEF 25%. s/p CABG   severe MR s/p clip June 2019. PHTN, DM, CKD Cr 1.3.   Admitted 1.12 with ADHF. bumex IV and bipap.   TTE 1.13: LVEDD 4.7, LVEF 20, normal RV size, decreased function. mean MV gradient 7-8 at HR 80.   RHC: 1.20: RA 15, RV 49/10, PA 50/21 32, PCWP 19. Chapin 3.5/2.4  Patient treated with milrinone and diuretics at one point. milrione d/c 1.26  toprol uptitrated with the notion that post clip MS might be contributing to her presentation   Patient awaiting transfer to subacute.   meds: bumex 2 alt 1, HDZN 37.5 Q, toprol  Q12 asa plavix, statin.   79SR, 93/-105/, ? 10-15lb weight loss since admission.   02-02    137  |  101  |  103<H>  ----------------------------<  117<H>  3.7   |  18<L>  |  1.89<H>  Cr 1.9, 1.9,   Ca    9.6      02 Feb 2021 06:36  Phos  4.7     02-02  Mg     2.5     02-02                        8.3    11.03 )-----------( 474      ( 02 Feb 2021 06:36 )             27.4  patient appears overall clinically stable. could continue current medical regimen.   would add low dose aldactone 12.5  patient should follow with Dr Gregg in the HF clinic at [facility]. Please make appoint for CHENG Villar  at that clinic within 2 weeks of d/c.   [place]

## 2021-02-02 NOTE — PROGRESS NOTE ADULT - NSHPATTENDINGPLANDISCUSS_GEN_ALL_CORE
RN at bedside
Cardiology/Family
resident
resident team
HS T8
resident team

## 2021-02-02 NOTE — PROGRESS NOTE ADULT - PROVIDER SPECIALTY LIST ADULT
Cardiology
Endocrinology
Heart Failure
Nephrology
Cardiology
Endocrinology
Internal Medicine
Nephrology
Cardiology
Heart Failure
Nephrology
Nephrology
Heart Failure
Nephrology
Endocrinology
Internal Medicine
Endocrinology
Heart Failure
Heart Failure
Internal Medicine
Heart Failure
Heart Failure
Endocrinology
Endocrinology
Heart Failure
Endocrinology
Heart Failure
Heart Failure
Internal Medicine
Endocrinology
Endocrinology
Heart Failure
Internal Medicine

## 2021-02-02 NOTE — PROGRESS NOTE ADULT - PROBLEM SELECTOR PLAN 9
Diet: DASH diet   DVT ppx: HSQ

## 2021-02-02 NOTE — PROGRESS NOTE ADULT - ASSESSMENT
Assessment  DMT2: 75y Female with DM T2 with hyperglycemia, A1C 7.1%, was on insulin at home, now on basal bolus insulin, decreased basal dose yesterday, blood sugars improved and trending within overall acceptable range, no hypoglycemic episodes. She appears comfortable, eating meals, DC planning in progress.  HF: on medications, stable, monitored, FU Cardiology.  Hypothyroidism: On Synthroid 50 mcg po daily, compliant with Synthroid intake, asymptomatic, euthyroid.  CKD: Monitor labs/BMP      Jillian Banks MD  Cell: [phone]  Office: [phone]       Assessment  DMT2: 75y Female with DM T2 with hyperglycemia, A1C 7.1%, was on insulin at home, now on basal bolus insulin, decreased basal dose yesterday, blood sugars  improved and trending within overall acceptable range, no hypoglycemic episodes. She appears comfortable, eating meals, DC planning in progress.  HF: on medications, stable, monitored, FU Cardiology.  Hypothyroidism: On Synthroid 50 mcg po daily, compliant with Synthroid intake, asymptomatic, euthyroid.  CKD: Monitor labs/BMP      Jillian Banks MD  Cell: [phone]  Office: [phone]

## 2021-02-02 NOTE — PROGRESS NOTE ADULT - SUBJECTIVE AND OBJECTIVE BOX
Chief complaint  Patient is a 75y old  Female who presents with a chief complaint of increased work of breathing (02 Feb 2021 13:09)   Review of systems  Patient in bed, looks comfortable, no hypoglycemic episodes.    Labs and Fingersticks  CAPILLARY BLOOD GLUCOSE      POCT Blood Glucose.: 160 mg/dL (02 Feb 2021 11:37)  POCT Blood Glucose.: 118 mg/dL (02 Feb 2021 07:31)  POCT Blood Glucose.: 184 mg/dL (01 Feb 2021 21:41)  POCT Blood Glucose.: 91 mg/dL (01 Feb 2021 16:29)      Anion Gap, Serum: 18 <H> (02-02 @ 06:36)  Anion Gap, Serum: 17 (02-01 @ 06:04)      Calcium, Total Serum: 9.6 (02-02 @ 06:36)  Calcium, Total Serum: 9.5 (02-01 @ 06:04)          02-02    137  |  101  |  103<H>  ----------------------------<  117<H>  3.7   |  18<L>  |  1.89<H>    Ca    9.6      02 Feb 2021 06:36  Phos  4.7     02-02  Mg     2.5     02-02                          8.3    11.03 )-----------( 474      ( 02 Feb 2021 06:36 )             27.4     Medications  MEDICATIONS  (STANDING):  aspirin enteric coated 81 milliGRAM(s) Oral daily  atorvastatin 80 milliGRAM(s) Oral at bedtime  buMETAnide 2 milliGRAM(s) Oral <User Schedule>  buMETAnide 1 milliGRAM(s) Oral <User Schedule>  clopidogrel Tablet 75 milliGRAM(s) Oral daily  dextrose 40% Gel 15 Gram(s) Oral once  dextrose 5%. 1000 milliLiter(s) (50 mL/Hr) IV Continuous <Continuous>  dextrose 5%. 1000 milliLiter(s) (100 mL/Hr) IV Continuous <Continuous>  dextrose 50% Injectable 25 Gram(s) IV Push once  dextrose 50% Injectable 12.5 Gram(s) IV Push once  dextrose 50% Injectable 25 Gram(s) IV Push once  glucagon  Injectable 1 milliGRAM(s) IntraMuscular once  heparin   Injectable 5000 Unit(s) SubCutaneous every 8 hours  hydrALAZINE 37.5 milliGRAM(s) Oral every 8 hours  insulin glargine Injectable (LANTUS) 25 Unit(s) SubCutaneous every morning  insulin glargine Injectable (LANTUS) 45 Unit(s) SubCutaneous at bedtime  insulin lispro (ADMELOG) corrective regimen sliding scale   SubCutaneous three times a day before meals  insulin lispro (ADMELOG) corrective regimen sliding scale   SubCutaneous at bedtime  insulin lispro Injectable (ADMELOG) 16 Unit(s) SubCutaneous three times a day before meals  levothyroxine 50 MICROGram(s) Oral daily  melatonin 3 milliGRAM(s) Oral at bedtime  metoprolol succinate  milliGRAM(s) Oral every 12 hours  senna 2 Tablet(s) Oral at bedtime  sodium bicarbonate 650 milliGRAM(s) Oral daily      Physical Exam  General: Patient comfortable in bed  Vital Signs Last 12 Hrs  T(F): 98.1 (02-02-21 @ 11:53), Max: 98.1 (02-02-21 @ 11:53)  HR: 79 (02-02-21 @ 11:53) (75 - 79)  BP: 108/64 (02-02-21 @ 11:53) (93/51 - 108/64)  BP(mean): --  RR: 18 (02-02-21 @ 11:53) (18 - 18)  SpO2: 96% (02-02-21 @ 11:53) (96% - 97%)  Neck: No palpable thyroid nodules.  CVS: S1S2, No murmurs  Respiratory: No wheezing, no crepitations  GI: Abdomen soft, bowel sounds positive  Musculoskeletal:  edema lower extremities.   Skin: No skin rashes, no ecchymosis    Diagnostics             Chief complaint  Patient is a 75y old  Female who presents with a chief complaint of increased work of breathing (02 Feb 2021 13:09)   Review of systems  Patient in bed, looks comfortable, no hypoglycemic episodes.    Labs and Fingersticks  CAPILLARY BLOOD GLUCOSE      POCT Blood Glucose.: 160 mg/dL (02 Feb 2021 11:37)  POCT Blood Glucose.: 118 mg/dL (02 Feb 2021 07:31)  POCT Blood Glucose.: 184 mg/dL (01 Feb 2021 21:41)  POCT Blood Glucose.: 91 mg/dL (01 Feb 2021 16:29)      Anion Gap, Serum: 18 <H> (02-02 @ 06:36)  Anion Gap, Serum: 17 (02-01 @ 06:04)      Calcium, Total Serum: 9.6 (02-02 @ 06:36)  Calcium, Total Serum: 9.5 (02-01 @ 06:04)          02-02    137  |  101  |  103<H>  ----------------------------<  117<H>  3.7   |  18<L>  |  1.89<H>    Ca    9.6      02 Feb 2021 06:36  Phos  4.7     02-02  Mg     2.5     02-02                          8.3    11.03 )-----------( 474      ( 02 Feb 2021 06:36 )             27.4     Medications  MEDICATIONS  (STANDING):  aspirin enteric coated 81 milliGRAM(s) Oral daily  atorvastatin 80 milliGRAM(s) Oral at bedtime  buMETAnide 2 milliGRAM(s) Oral <User Schedule>  buMETAnide 1 milliGRAM(s) Oral <User Schedule>  clopidogrel Tablet 75 milliGRAM(s) Oral daily  dextrose 40% Gel 15 Gram(s) Oral once  dextrose 5%. 1000 milliLiter(s) (50 mL/Hr) IV Continuous <Continuous>  dextrose 5%. 1000 milliLiter(s) (100 mL/Hr) IV Continuous <Continuous>  dextrose 50% Injectable 25 Gram(s) IV Push once  dextrose 50% Injectable 12.5 Gram(s) IV Push once  dextrose 50% Injectable 25 Gram(s) IV Push once  glucagon  Injectable 1 milliGRAM(s) IntraMuscular once  heparin   Injectable 5000 Unit(s) SubCutaneous every 8 hours  hydrALAZINE 37.5 milliGRAM(s) Oral every 8 hours  insulin glargine Injectable (LANTUS) 25 Unit(s) SubCutaneous every morning  insulin glargine Injectable (LANTUS) 45 Unit(s) SubCutaneous at bedtime  insulin lispro (ADMELOG) corrective regimen sliding scale   SubCutaneous three times a day before meals  insulin lispro (ADMELOG) corrective regimen sliding scale   SubCutaneous at bedtime  insulin lispro Injectable (ADMELOG) 16 Unit(s) SubCutaneous three times a day before meals  levothyroxine 50 MICROGram(s) Oral daily  melatonin 3 milliGRAM(s) Oral at bedtime  metoprolol succinate  milliGRAM(s) Oral every 12 hours  senna 2 Tablet(s) Oral at bedtime  sodium bicarbonate 650 milliGRAM(s) Oral daily      Physical Exam  General: Patient comfortable in bed  Vital Signs Last 12 Hrs  T(F): 98.1 (02-02-21 @ 11:53), Max: 98.1 (02-02-21 @ 11:53)  HR: 79 (02-02-21 @ 11:53) (75 - 79)  BP: 108/64 (02-02-21 @ 11:53) (93/51 - 108/64)  BP(mean): --  RR: 18 (02-02-21 @ 11:53) (18 - 18)  SpO2: 96% (02-02-21 @ 11:53) (96% - 97%)  Neck: No palpable thyroid nodules.  CVS: S1S2, No murmurs  Respiratory: No wheezing, no crepitations  GI: Abdomen soft, bowel sounds positive  Musculoskeletal:  edema lower extremities.   Skin: No skin rashes, no ecchymosis    Diagnostics

## 2021-02-02 NOTE — PROGRESS NOTE ADULT - ASSESSMENT
Briefly, 76 yo F with HTN, HLD, DM2 (A1c 7.5 10/19), CAD s/p CABG (LIMA to LAD, SVG to OM, SVG to PDA) and prior PCI, ICM HFrEF (EF 20-25%, LVEDD 4.7 cm), severe mitral regurgitation s/p mitral clip (6/19; mean MV gradient 7-8), severe pulmonary HTN, CKD IV (b/l Cr 1.9-2.2), hypothyroidism who BIBEMS 2/2 worsening work of breathing presents in acute on chronic systolic CHF with NYHA IV functional status. Was notably on midodrine as outpatient since discharge from [facility] in 10/19. Has been having increased work of breathing at home with failure to thrive. Initially required Bipap and was given bumex with some improvement. Continues to be dyspneic. Etiology of her symptoms may be secondary to degree of mitral stenosis given HR in 80-90s with mean gradient of 8 on TTE. Also, per nursing staff some of her dyspnea may be anxiety/pain related. She is s/p RHC 1/20 which showed mildly elevated filling pressures with preserved CO and was started on milrinone to assist with diuresis given her fluctuating renal function, which was weaned off 1/26.    She currently appears mildly volume up on exam. Her BUN/Cr remain stable off milrinone and she is tolerating low dose vasodilators.    1/20/20 RHC: RA 15, RV 49/10, PA 50/21 32, PCWP 19, PA 55.4, Ao 98. [place] 3.5/2.5

## 2021-02-02 NOTE — PROGRESS NOTE ADULT - SUBJECTIVE AND OBJECTIVE BOX
For all Cardiology service contact information, go to [email or web address] and use "[email or web address]" to login.    SUBJECTIVE:   No events overnight. Denies CP, SOB or Dyspnea.  NSR 70-80   -------------------------------------------------------------------------------------------  ROS:  CV: chest pain (-), palpitation (-), orthopnea (-), PND (-), edema (-)  PULM: SOB (-), cough (-), wheezing (-), hemoptysis (-).   CONST: fever (-), chills (-) or fatigue (-)  GI: abdominal distension (-), abdominal pain (-) , nausea/vomiting (-), hematemesis, (-), melena (-), hematochezia (-)  : dysuria (-), frequency (-), hematuria (-).   NEURO: numbness (-), weakness (-), dizziness (-)  SKIN: itching (-), rash (-)  HEENT:  visual changes (-); vertigo or throat pain (-);  neck stiffness (-)     All other review of systems is negative unless indicated above.   -------------------------------------------------------------------------------------------  VS:  T(F): 98.1 (02-02), Max: 98.1 (02-02)  HR: 79 (02-02) (75 - 79)  BP: 108/64 (02-02) (93/51 - 108/64)  RR: 18 (02-02)  SpO2: 96% (02-02)  I&O's Summary    01 Feb 2021 07:01  -  02 Feb 2021 07:00  --------------------------------------------------------  IN: 720 mL / OUT: 950 mL / NET: -230 mL    02 Feb 2021 07:01  -  02 Feb 2021 17:55  --------------------------------------------------------  IN: 240 mL / OUT: 450 mL / NET: -210 mL      ------------------------------------------------------------------------------------------  PHYSICAL EXAM:  GENERAL: NAD  HEAD:  Atraumatic, Normocephalic.  EYES: EOMI, PERRLA, conjunctiva and sclera clear.  ENT: Moist mucous membranes.  NECK: Supple, No JVD.  CHEST/LUNG: Clear to auscultation bilaterally; No rales, rhonchi, wheezing, or rubs. Unlabored respirations.  HEART: Regular rate and rhythm; No murmurs, rubs, or gallops.  ABDOMEN: Bowel sounds present; Soft, Nontender, Nondistended.   EXTREMITIES:  2+ Peripheral Pulses, brisk capillary refill. No clubbing, cyanosis, or edema.  PSYCH: Normal affect.  SKIN: No rashes or lesions.  -------------------------------------------------------------------------------------------  LABS:                          8.3    11.03 )-----------( 474      ( 02 Feb 2021 06:36 )             27.4     02-02    137  |  101  |  103<H>  ----------------------------<  117<H>  3.7   |  18<L>  |  1.89<H>    Ca    9.6      02 Feb 2021 06:36  Phos  4.7     02-02  Mg     2.5     02-02                  -------------------------------------------------------------------------------------------  Meds:  ALPRAZolam 0.25 milliGRAM(s) Oral every 8 hours PRN  aspirin enteric coated 81 milliGRAM(s) Oral daily  atorvastatin 80 milliGRAM(s) Oral at bedtime  buMETAnide 1 milliGRAM(s) Oral <User Schedule>  buMETAnide 2 milliGRAM(s) Oral <User Schedule>  clopidogrel Tablet 75 milliGRAM(s) Oral daily  cyclobenzaprine 5 milliGRAM(s) Oral three times a day PRN  dextrose 40% Gel 15 Gram(s) Oral once  dextrose 5%. 1000 milliLiter(s) IV Continuous <Continuous>  dextrose 5%. 1000 milliLiter(s) IV Continuous <Continuous>  dextrose 50% Injectable 25 Gram(s) IV Push once  dextrose 50% Injectable 12.5 Gram(s) IV Push once  dextrose 50% Injectable 25 Gram(s) IV Push once  glucagon  Injectable 1 milliGRAM(s) IntraMuscular once  heparin   Injectable 5000 Unit(s) SubCutaneous every 8 hours  hydrALAZINE 37.5 milliGRAM(s) Oral every 8 hours  insulin glargine Injectable (LANTUS) 45 Unit(s) SubCutaneous at bedtime  insulin glargine Injectable (LANTUS) 25 Unit(s) SubCutaneous every morning  insulin lispro (ADMELOG) corrective regimen sliding scale   SubCutaneous three times a day before meals  insulin lispro (ADMELOG) corrective regimen sliding scale   SubCutaneous at bedtime  insulin lispro Injectable (ADMELOG) 16 Unit(s) SubCutaneous three times a day before meals  levothyroxine 50 MICROGram(s) Oral daily  melatonin 3 milliGRAM(s) Oral at bedtime  metoprolol succinate  milliGRAM(s) Oral every 12 hours  polyethylene glycol 3350 17 Gram(s) Oral two times a day PRN  senna 2 Tablet(s) Oral at bedtime  sodium bicarbonate 650 milliGRAM(s) Oral daily    -------------------------------------------------------------------------------------------

## 2021-02-02 NOTE — PROGRESS NOTE ADULT - PROBLEM SELECTOR PLAN 4
Patient with persistent neck pain  Pain increased with movement, and with tenderness to palpation  Most likely MSK in nature  - pain control with tylenol ATC and lidocaine patch  - CT cervical spine unremarkable for pathology  - c/w flexeril PRN Most likely due to LV failure   -repeat TTE shows mild pulm HTN w/ normal RV size but decreased function

## 2021-02-02 NOTE — PROGRESS NOTE ADULT - SUBJECTIVE AND OBJECTIVE BOX
S: Resting comfortably on room air, denies chest pain or SOB. Review of systems otherwise (-)    Review of Systems:   Constitutional: [ ] fevers, [ ] chills.   Skin: [ ] dry skin. [ ] rashes.  Psychiatric: [ ] depression, [ ] anxiety.   Gastrointestinal: [ ] BRBPR, [ ] melena.   Neurological: [ ] confusion. [ ] seizures. [ ] shuffling gait.   Ears,Nose,Mouth and Throat: [ ] ear pain [ ] sore throat.   Eyes: [ ] diplopia.   Respiratory: [ ] hemoptysis. [ ] shortness of breath  Cardiovascular: See HPI above  Hematologic/Lymphatic: [ ] anemia. [ ] painful nodes. [ ] prolonged bleeding.   Genitourinary: [ ] hematuria. [ ] flank pain.   Endocrine: [ ] significant change in weight. [ ] intolerance to heat and cold.     Review of systems [ x] otherwise negative, [ ] otherwise unable to obtain    FH: no family history of sudden cardiac death in first degree relatives    SH: [ ] tobacco, [ ] alcohol, [ ] drugs          MEDICATIONS  (STANDING):  aspirin enteric coated 81 milliGRAM(s) Oral daily  atorvastatin 80 milliGRAM(s) Oral at bedtime  buMETAnide 2 milliGRAM(s) Oral <User Schedule>  buMETAnide 1 milliGRAM(s) Oral <User Schedule>  clopidogrel Tablet 75 milliGRAM(s) Oral daily  dextrose 40% Gel 15 Gram(s) Oral once  dextrose 5%. 1000 milliLiter(s) (50 mL/Hr) IV Continuous <Continuous>  dextrose 5%. 1000 milliLiter(s) (100 mL/Hr) IV Continuous <Continuous>  dextrose 50% Injectable 25 Gram(s) IV Push once  dextrose 50% Injectable 12.5 Gram(s) IV Push once  dextrose 50% Injectable 25 Gram(s) IV Push once  glucagon  Injectable 1 milliGRAM(s) IntraMuscular once  heparin   Injectable 5000 Unit(s) SubCutaneous every 8 hours  hydrALAZINE 37.5 milliGRAM(s) Oral every 8 hours  insulin glargine Injectable (LANTUS) 25 Unit(s) SubCutaneous every morning  insulin glargine Injectable (LANTUS) 45 Unit(s) SubCutaneous at bedtime  insulin lispro (ADMELOG) corrective regimen sliding scale   SubCutaneous three times a day before meals  insulin lispro (ADMELOG) corrective regimen sliding scale   SubCutaneous at bedtime  insulin lispro Injectable (ADMELOG) 16 Unit(s) SubCutaneous three times a day before meals  levothyroxine 50 MICROGram(s) Oral daily  melatonin 3 milliGRAM(s) Oral at bedtime  metoprolol succinate  milliGRAM(s) Oral every 12 hours  senna 2 Tablet(s) Oral at bedtime    MEDICATIONS  (PRN):  ALPRAZolam 0.25 milliGRAM(s) Oral every 8 hours PRN anxiety  cyclobenzaprine 5 milliGRAM(s) Oral three times a day PRN Muscle Spasm  polyethylene glycol 3350 17 Gram(s) Oral two times a day PRN Constipation      LABS:                          8.3    11.03 )-----------( 474      ( 02 Feb 2021 06:36 )             27.4     Hemoglobin: 8.3 g/dL (02-02 @ 06:36)  Hemoglobin: 10.0 g/dL (02-01 @ 12:50)  Hemoglobin: 8.7 g/dL (02-01 @ 06:04)  Hemoglobin: 9.6 g/dL (01-31 @ 07:29)  Hemoglobin: 11.4 g/dL (01-30 @ 08:53)    02-02    137  |  101  |  103<H>  ----------------------------<  117<H>  3.7   |  18<L>  |  1.89<H>    Ca    9.6      02 Feb 2021 06:36  Phos  4.7     02-02  Mg     2.5     02-02      Creatinine Trend: 1.89<--, 1.97<--, 1.98<--, 1.97<--, 2.04<--, 2.13<--             02-01-21 @ 07:01  -  02-02-21 @ 07:00  --------------------------------------------------------  IN: 720 mL / OUT: 950 mL / NET: -230 mL    02-02-21 @ 07:01  -  02-02-21 @ 11:30  --------------------------------------------------------  IN: 240 mL / OUT: 450 mL / NET: -210 mL        PHYSICAL EXAM  Vital Signs Last 24 Hrs  T(C): 36.7 (02 Feb 2021 04:37), Max: 36.7 (01 Feb 2021 20:09)  T(F): 98 (02 Feb 2021 04:37), Max: 98 (01 Feb 2021 20:09)  HR: 79 (02 Feb 2021 06:52) (75 - 79)  BP: 94/54 (02 Feb 2021 06:52) (93/51 - 105/64)  BP(mean): --  RR: 18 (02 Feb 2021 04:37) (18 - 18)  SpO2: 97% (02 Feb 2021 04:37) (97% - 97%)      General: Well nourished in no acute distress. Alert and Oriented * 3.   Head: Normocephalic and atraumatic.   Neck: No JVD. No bruits. Supple. Does not appear to be enlarged.   Cardiovascular: + S1,S2 ; RRR Soft systolic murmur at the left lower sternal border. No rubs noted.    Lungs: CTA b/l. No rhonchi, rales or wheezes.   Abdomen: + BS, soft. Non tender. Non distended. No rebound. No guarding.   Extremities: No clubbing/cyanosis/edema.   Neurologic: Moves all four extremities. Full range of motion.   Skin: Warm and moist. The patient's skin has normal elasticity and good skin turgor.   Psychiatric: Appropriate mood and affect.  Musculoskeletal: Normal range of motion, normal strength    TELEMETRY: SB/SR 50-70s    < from: Cardiac Cath Lab - Adult (01.20.21 @ 10:22) >  PROCEDURE:  --  Right heart catheterization.  --  Sonosite - Diagnostic.  COMPLICATIONS: There were no complications.  DIAGNOSTIC RECOMMENDATIONS: The patient should continuewith the present  medications.  Prepared and signed by  Cesar Jackson M.D.       A/P) 74 y/o female PMH HTN, HLD, DM, CAD s/p CABG 2014 and prior PCI, severe mitral regurgitation (s/p mitral clip 2019), pulmonary HTN, HF (EF 21 % June 2019), CKD IV not on dialysis (b/l SCr 2-2.2), hypothyroidism a/w acute on chronic systolic CHF with NYHA IV functional status.    -Volume status improved - continue PO Bumex  -Continue dapt/statin   -CT C spine noted with no fracture, degenerative changes  -Repeat echo noted but didn't reassess degree of mitral stenosis  -RHC noted above - Filling pressures are appropriate considering LV function and the mitral valve gradient  -Continue Toprol XL, tolerating hydralazine  -very poor candidate for a primary prevention ICD  -no further inpatient cardiac w/u planned  -D/C planning to [facility] per primary team  -f/u with Dr. Sotelo on Friday 2/5 at 2:20 PM - will reschedule appt as patient is pending rehab    Amauri Hill PA-C  Pager: [phone]       S: Resting comfortably on room air, denies chest pain or SOB. Review of systems otherwise (-)    Review of Systems:   Constitutional: [ ] fevers, [ ] chills.   Skin: [ ] dry skin. [ ] rashes.  Psychiatric: [ ] depression, [ ] anxiety.   Gastrointestinal: [ ] BRBPR, [ ] melena.   Neurological: [ ] confusion. [ ] seizures. [ ] shuffling gait.   Ears,Nose,Mouth and Throat: [ ] ear pain [ ] sore throat.   Eyes: [ ] diplopia.   Respiratory: [ ] hemoptysis. [ ] shortness of breath  Cardiovascular: See HPI above  Hematologic/Lymphatic: [ ] anemia. [ ] painful nodes. [ ] prolonged bleeding.   Genitourinary: [ ] hematuria. [ ] flank pain.   Endocrine: [ ] significant change in weight. [ ] intolerance to heat and cold.     Review of systems [ x] otherwise negative, [ ] otherwise unable to obtain    FH: no family history of sudden cardiac death in first degree relatives    SH: [ ] tobacco, [ ] alcohol, [ ] drugs          MEDICATIONS  (STANDING):  aspirin enteric coated 81 milliGRAM(s) Oral daily  atorvastatin 80 milliGRAM(s) Oral at bedtime  buMETAnide 2 milliGRAM(s) Oral <User Schedule>  buMETAnide 1 milliGRAM(s) Oral <User Schedule>  clopidogrel Tablet 75 milliGRAM(s) Oral daily  dextrose 40% Gel 15 Gram(s) Oral once  dextrose 5%. 1000 milliLiter(s) (50 mL/Hr) IV Continuous <Continuous>  dextrose 5%. 1000 milliLiter(s) (100 mL/Hr) IV Continuous <Continuous>  dextrose 50% Injectable 25 Gram(s) IV Push once  dextrose 50% Injectable 12.5 Gram(s) IV Push once  dextrose 50% Injectable 25 Gram(s) IV Push once  glucagon  Injectable 1 milliGRAM(s) IntraMuscular once  heparin   Injectable 5000 Unit(s) SubCutaneous every 8 hours  hydrALAZINE 37.5 milliGRAM(s) Oral every 8 hours  insulin glargine Injectable (LANTUS) 25 Unit(s) SubCutaneous every morning  insulin glargine Injectable (LANTUS) 45 Unit(s) SubCutaneous at bedtime  insulin lispro (ADMELOG) corrective regimen sliding scale   SubCutaneous three times a day before meals  insulin lispro (ADMELOG) corrective regimen sliding scale   SubCutaneous at bedtime  insulin lispro Injectable (ADMELOG) 16 Unit(s) SubCutaneous three times a day before meals  levothyroxine 50 MICROGram(s) Oral daily  melatonin 3 milliGRAM(s) Oral at bedtime  metoprolol succinate  milliGRAM(s) Oral every 12 hours  senna 2 Tablet(s) Oral at bedtime    MEDICATIONS  (PRN):  ALPRAZolam 0.25 milliGRAM(s) Oral every 8 hours PRN anxiety  cyclobenzaprine 5 milliGRAM(s) Oral three times a day PRN Muscle Spasm  polyethylene glycol 3350 17 Gram(s) Oral two times a day PRN Constipation      LABS:                          8.3    11.03 )-----------( 474      ( 02 Feb 2021 06:36 )             27.4     Hemoglobin: 8.3 g/dL (02-02 @ 06:36)  Hemoglobin: 10.0 g/dL (02-01 @ 12:50)  Hemoglobin: 8.7 g/dL (02-01 @ 06:04)  Hemoglobin: 9.6 g/dL (01-31 @ 07:29)  Hemoglobin: 11.4 g/dL (01-30 @ 08:53)    02-02    137  |  101  |  103<H>  ----------------------------<  117<H>  3.7   |  18<L>  |  1.89<H>    Ca    9.6      02 Feb 2021 06:36  Phos  4.7     02-02  Mg     2.5     02-02      Creatinine Trend: 1.89<--, 1.97<--, 1.98<--, 1.97<--, 2.04<--, 2.13<--             02-01-21 @ 07:01  -  02-02-21 @ 07:00  --------------------------------------------------------  IN: 720 mL / OUT: 950 mL / NET: -230 mL    02-02-21 @ 07:01  -  02-02-21 @ 11:30  --------------------------------------------------------  IN: 240 mL / OUT: 450 mL / NET: -210 mL        PHYSICAL EXAM  Vital Signs Last 24 Hrs  T(C): 36.7 (02 Feb 2021 04:37), Max: 36.7 (01 Feb 2021 20:09)  T(F): 98 (02 Feb 2021 04:37), Max: 98 (01 Feb 2021 20:09)  HR: 79 (02 Feb 2021 06:52) (75 - 79)  BP: 94/54 (02 Feb 2021 06:52) (93/51 - 105/64)  BP(mean): --  RR: 18 (02 Feb 2021 04:37) (18 - 18)  SpO2: 97% (02 Feb 2021 04:37) (97% - 97%)      General: Well nourished in no acute distress. Alert and Oriented * 3.   Head: Normocephalic and atraumatic.   Neck: No JVD. No bruits. Supple. Does not appear to be enlarged.   Cardiovascular: + S1,S2 ; RRR Soft systolic murmur at the left lower sternal border. No rubs noted.    Lungs: CTA b/l. No rhonchi, rales or wheezes.   Abdomen: + BS, soft. Non tender. Non distended. No rebound. No guarding.   Extremities: No clubbing/cyanosis/edema.   Neurologic: Moves all four extremities. Full range of motion.   Skin: Warm and moist. The patient's skin has normal elasticity and good skin turgor.   Psychiatric: Appropriate mood and affect.  Musculoskeletal: Normal range of motion, normal strength    TELEMETRY: SB/SR 50-70s    < from: Cardiac Cath Lab - Adult (01.20.21 @ 10:22) >  PROCEDURE:  --  Right heart catheterization.  --  Sonosite - Diagnostic.  COMPLICATIONS: There were no complications.  DIAGNOSTIC RECOMMENDATIONS: The patient should continuewith the present  medications.  Prepared and signed by  Cesar Jackson M.D.       A/P) 76 y/o female PMH HTN, HLD, DM, CAD s/p CABG 2014 and prior PCI, severe mitral regurgitation (s/p mitral clip 2019), pulmonary HTN, HF (EF 21 % June 2019), CKD IV not on dialysis (b/l SCr 2-2.2), hypothyroidism a/w acute on chronic systolic CHF with NYHA IV functional status.    -Volume status improved - continue PO Bumex  -Continue dapt/statin   -CT C spine noted with no fracture, degenerative changes  -Repeat echo noted but didn't reassess degree of mitral stenosis  -RHC noted above - Filling pressures are appropriate considering LV function and the mitral valve gradient  -Continue Toprol XL, tolerating hydralazine  -very poor candidate for a primary prevention ICD  -no further inpatient cardiac w/u planned  -D/C planning to [facility] per primary team  -f/u with Dr. Sotelo on 3/5 at 2:20 PM    Amauri Hill PA-C  Pager: [phone]

## 2021-02-02 NOTE — PROGRESS NOTE ADULT - PROBLEM SELECTOR PLAN 1
Will continue current insulin regimen for now. Will continue monitoring FS and FU.  Suggest DC on current inpatient insulin regimen with endo FU 4 weeks.  Patient counseled for compliance with consistent low carb diet and exercise as tolerated outpatient.

## 2021-02-02 NOTE — PROGRESS NOTE ADULT - ATTENDING COMMENTS
Patient seen and examined     Patient seen and examined    VSS. no JVD or LE edema. Breathing markedly improved.     Cr stable. FSG improving, endo recs appreciated.     Will start PO bicarb supplementation. Continue with current diuretic regimen.     DC to rehab today    DC planning time 36 mins

## 2021-02-02 NOTE — PROGRESS NOTE ADULT - PROBLEM SELECTOR PLAN 5
Most likely due to LV failure   -repeat TTE shows mild pulm HTN w/ normal RV size but decreased function -Takes 60 units of lantus at bedtime and 18 units of humalog   - increased Lantus and Admelog based on sliding scale.  Will continue to titrate to achieve BS goal 140-180  - Endo consulted.  Started BID Lantus dosing 1/21  - pt to f/u w/ endo in 4 weeks post d/c w/ Dr. Jillian Banks

## 2021-02-02 NOTE — PROGRESS NOTE ADULT - PROBLEM SELECTOR PLAN 7
-c/w Levothyroxine 50 mcg daily - continue Atorvastatin 80 mg    # Asymptomatic bacteruria  - pt w/ intermittent wbc.  Denies dysruia/frequency however only source for WBC given neg cxr and cultures.  will treat w/ 3 day course of renally dosed augment for E. coli UTI

## 2021-02-02 NOTE — PROGRESS NOTE ADULT - PROBLEM SELECTOR PROBLEM 1
Acute respiratory failure with hypoxia
Chronic systolic heart failure
Type 2 diabetes mellitus with diabetic nephropathy, with long-term current use of insulin
Type 2 diabetes mellitus with diabetic nephropathy, with long-term current use of insulin
Acute respiratory failure with hypoxia
Type 2 diabetes mellitus with diabetic nephropathy, with long-term current use of insulin
Type 2 diabetes mellitus with diabetic nephropathy, with long-term current use of insulin
Acute respiratory failure with hypoxia
Chronic systolic heart failure
Type 2 diabetes mellitus with diabetic nephropathy, with long-term current use of insulin
Acute respiratory failure with hypoxia
Chronic systolic heart failure
Chronic systolic heart failure
Acute respiratory failure with hypoxia
Chronic systolic heart failure
Type 2 diabetes mellitus with diabetic nephropathy, with long-term current use of insulin
Type 2 diabetes mellitus with diabetic nephropathy, with long-term current use of insulin
Acute respiratory failure with hypoxia
Chronic systolic heart failure
Type 2 diabetes mellitus with diabetic nephropathy, with long-term current use of insulin
Chronic systolic heart failure
Type 2 diabetes mellitus with diabetic nephropathy, with long-term current use of insulin
Acute respiratory distress
Acute respiratory failure with hypoxia
Type 2 diabetes mellitus with diabetic nephropathy, with long-term current use of insulin
Type 2 diabetes mellitus with diabetic nephropathy, with long-term current use of insulin
Acute respiratory distress
Chronic systolic heart failure
Chronic systolic heart failure
Type 2 diabetes mellitus with diabetic nephropathy, with long-term current use of insulin
Type 2 diabetes mellitus with diabetic nephropathy, with long-term current use of insulin
Acute respiratory failure with hypoxia
Acute respiratory failure with hypoxia
Chronic systolic heart failure
Chronic systolic heart failure
Acute respiratory distress
Acute respiratory failure with hypoxia
Acute respiratory distress
Acute respiratory failure with hypoxia
Acute respiratory failure with hypoxia

## 2021-02-02 NOTE — DISCHARGE NOTE NURSING/CASE MANAGEMENT/SOCIAL WORK - PATIENT PORTAL LINK FT
You can access the FollowMyHealth Patient Portal offered by [facility] by registering at the following website: [email or web address]. By joining [facility]’s FollowMyHealth portal, you will also be able to view your health information using other applications (apps) compatible with our system.

## 2021-02-02 NOTE — PROGRESS NOTE ADULT - PROBLEM SELECTOR PLAN 8
- continue Atorvastatin 80 mg    # Asymptomatic bacteruria  - pt w/ intermittent wbc.  Denies dysruia/frequency however only source for WBC given neg cxr and cultures.  will treat w/ 3 day course of renally dosed augment for E. coli UTI Diet: DASH diet   DVT ppx: HSQ

## 2021-02-02 NOTE — PROGRESS NOTE ADULT - SUBJECTIVE AND OBJECTIVE BOX
Stevo Hewitt MD, PGY-2  Available on [employer] | Pager: [phone] | [employer]: [id number]  ---------------------------------------------------------------------------------------------  Patient is a 75y old  Female who presents with a chief complaint of increased work of breathing (02 Feb 2021 11:27)    SUBJECTIVE / OVERNIGHT EVENTS: No acute events overnight. Pt seen and examined at bedside. Pt denies any acute complaints including headache, fever, chills, nausea, vomiting, diarrhea, constipation, chest pain, shortness of breath. Shoulder pain resolved.      MEDICATIONS  (STANDING):  aspirin enteric coated 81 milliGRAM(s) Oral daily  atorvastatin 80 milliGRAM(s) Oral at bedtime  buMETAnide 2 milliGRAM(s) Oral <User Schedule>  buMETAnide 1 milliGRAM(s) Oral <User Schedule>  clopidogrel Tablet 75 milliGRAM(s) Oral daily  dextrose 40% Gel 15 Gram(s) Oral once  dextrose 5%. 1000 milliLiter(s) (50 mL/Hr) IV Continuous <Continuous>  dextrose 5%. 1000 milliLiter(s) (100 mL/Hr) IV Continuous <Continuous>  dextrose 50% Injectable 25 Gram(s) IV Push once  dextrose 50% Injectable 12.5 Gram(s) IV Push once  dextrose 50% Injectable 25 Gram(s) IV Push once  glucagon  Injectable 1 milliGRAM(s) IntraMuscular once  heparin   Injectable 5000 Unit(s) SubCutaneous every 8 hours  hydrALAZINE 37.5 milliGRAM(s) Oral every 8 hours  insulin glargine Injectable (LANTUS) 25 Unit(s) SubCutaneous every morning  insulin glargine Injectable (LANTUS) 45 Unit(s) SubCutaneous at bedtime  insulin lispro (ADMELOG) corrective regimen sliding scale   SubCutaneous three times a day before meals  insulin lispro (ADMELOG) corrective regimen sliding scale   SubCutaneous at bedtime  insulin lispro Injectable (ADMELOG) 16 Unit(s) SubCutaneous three times a day before meals  levothyroxine 50 MICROGram(s) Oral daily  melatonin 3 milliGRAM(s) Oral at bedtime  metoprolol succinate  milliGRAM(s) Oral every 12 hours  senna 2 Tablet(s) Oral at bedtime    MEDICATIONS  (PRN):  ALPRAZolam 0.25 milliGRAM(s) Oral every 8 hours PRN anxiety  cyclobenzaprine 5 milliGRAM(s) Oral three times a day PRN Muscle Spasm  polyethylene glycol 3350 17 Gram(s) Oral two times a day PRN Constipation      CAPILLARY BLOOD GLUCOSE      POCT Blood Glucose.: 160 mg/dL (02 Feb 2021 11:37)  POCT Blood Glucose.: 118 mg/dL (02 Feb 2021 07:31)  POCT Blood Glucose.: 184 mg/dL (01 Feb 2021 21:41)  POCT Blood Glucose.: 91 mg/dL (01 Feb 2021 16:29)    I&O's Summary    01 Feb 2021 07:01  -  02 Feb 2021 07:00  --------------------------------------------------------  IN: 720 mL / OUT: 950 mL / NET: -230 mL    02 Feb 2021 07:01  -  02 Feb 2021 13:10  --------------------------------------------------------  IN: 240 mL / OUT: 450 mL / NET: -210 mL    PHYSICAL EXAM:  Vital Signs Last 24 Hrs  T(C): 36.7 (02 Feb 2021 11:53), Max: 36.7 (01 Feb 2021 20:09)  T(F): 98.1 (02 Feb 2021 11:53), Max: 98.1 (02 Feb 2021 11:53)  HR: 79 (02 Feb 2021 11:53) (75 - 79)  BP: 108/64 (02 Feb 2021 11:53) (93/51 - 108/64)  RR: 18 (02 Feb 2021 11:53) (18 - 18)  SpO2: 96% (02 Feb 2021 11:53) (96%    GENERAL: NAD, comfortable appearing in bed  HEAD:  Atraumatic, Normocephalic  EYES: EOMI, PERRLA, conjunctiva and sclera clear  NECK: Supple, No JVD  CHEST/LUNG: cta anteriorly, no wheezing  HEART: Regular rate and rhythm; No murmurs, rubs, or gallops  ABDOMEN: Soft, Nontender, Nondistended; Bowel sounds present  EXTREMITIES:  2+ Peripheral Pulses, No clubbing, cyanosis, or lower extremity edema, wwp  PSYCH: AAOx3  NEUROLOGY: non-focal  SKIN: No rashes or lesions    LABS:                        8.3    11.03 )-----------( 474      ( 02 Feb 2021 06:36 )             27.4     02-02    137  |  101  |  103<H>  ----------------------------<  117<H>  3.7   |  18<L>  |  1.89<H>    Ca    9.6      02 Feb 2021 06:36  Phos  4.7     02-02  Mg     2.5     02-02                      RADIOLOGY & ADDITIONAL TESTS:  Results Reviewed:   Imaging Personally Reviewed:  Electrocardiogram Personally Reviewed:    COORDINATION OF CARE:  Care Discussed with Consultants/Other Providers [Y/N]:  Prior or Outpatient Records Reviewed [Y/N]:

## 2021-02-02 NOTE — PROGRESS NOTE ADULT - PROBLEM/PLAN-8
DISPLAY PLAN FREE TEXT
Fall with Harm Risk
DISPLAY PLAN FREE TEXT

## 2021-02-02 NOTE — PROGRESS NOTE ADULT - SUBJECTIVE AND OBJECTIVE BOX
[facility] NEPHROLOGY PRACTICE   MD Dion Dasilva MD, D.O. Ruoru Wong, PA    From 7 AM - 5 PM:  OFFICE: [phone]  Dr. Muhammad cell: [phone]  Dr. Montero cell: [phone]  Dr. Soria cell: [phone]  RASHIDA Smith cell: [phone]    From 5 PM - 7 AM: Answering Service: [phone]  Date of service: 02-02-21 @ 14:15    RENAL FOLLOW UP NOTE  --------------------------------------------------------------------------------  HPI:  Pt seen and examined at bedside.   Denies SOB, chest pain     PAST HISTORY  --------------------------------------------------------------------------------  No significant changes to PMH, PSH, FHx, SHx, unless otherwise noted    ALLERGIES & MEDICATIONS  --------------------------------------------------------------------------------  Allergies    azithromycin (Hives; Pruritus)    Intolerances      Standing Inpatient Medications  aspirin enteric coated 81 milliGRAM(s) Oral daily  atorvastatin 80 milliGRAM(s) Oral at bedtime  buMETAnide 1 milliGRAM(s) Oral <User Schedule>  buMETAnide 2 milliGRAM(s) Oral <User Schedule>  clopidogrel Tablet 75 milliGRAM(s) Oral daily  dextrose 40% Gel 15 Gram(s) Oral once  dextrose 5%. 1000 milliLiter(s) IV Continuous <Continuous>  dextrose 5%. 1000 milliLiter(s) IV Continuous <Continuous>  dextrose 50% Injectable 25 Gram(s) IV Push once  dextrose 50% Injectable 12.5 Gram(s) IV Push once  dextrose 50% Injectable 25 Gram(s) IV Push once  glucagon  Injectable 1 milliGRAM(s) IntraMuscular once  heparin   Injectable 5000 Unit(s) SubCutaneous every 8 hours  hydrALAZINE 37.5 milliGRAM(s) Oral every 8 hours  insulin glargine Injectable (LANTUS) 45 Unit(s) SubCutaneous at bedtime  insulin glargine Injectable (LANTUS) 25 Unit(s) SubCutaneous every morning  insulin lispro (ADMELOG) corrective regimen sliding scale   SubCutaneous three times a day before meals  insulin lispro (ADMELOG) corrective regimen sliding scale   SubCutaneous at bedtime  insulin lispro Injectable (ADMELOG) 16 Unit(s) SubCutaneous three times a day before meals  levothyroxine 50 MICROGram(s) Oral daily  melatonin 3 milliGRAM(s) Oral at bedtime  metoprolol succinate  milliGRAM(s) Oral every 12 hours  senna 2 Tablet(s) Oral at bedtime  sodium bicarbonate 650 milliGRAM(s) Oral daily    PRN Inpatient Medications  ALPRAZolam 0.25 milliGRAM(s) Oral every 8 hours PRN  cyclobenzaprine 5 milliGRAM(s) Oral three times a day PRN  polyethylene glycol 3350 17 Gram(s) Oral two times a day PRN      REVIEW OF SYSTEMS  --------------------------------------------------------------------------------  General: no fever  CVS: no chest pain  RESP: no sob, no cough  ABD: no abdominal pain  : no dysuria,  MSK: no edema     VITALS/PHYSICAL EXAM  --------------------------------------------------------------------------------  T(C): 36.7 (02-02-21 @ 11:53), Max: 36.7 (02-01-21 @ 20:09)  HR: 79 (02-02-21 @ 11:53) (75 - 79)  BP: 108/64 (02-02-21 @ 11:53) (93/51 - 108/64)  RR: 18 (02-02-21 @ 11:53) (18 - 18)  SpO2: 96% (02-02-21 @ 11:53) (96% - 97%)  Wt(kg): --        02-01-21 @ 07:01  -  02-02-21 @ 07:00  --------------------------------------------------------  IN: 720 mL / OUT: 950 mL / NET: -230 mL    02-02-21 @ 07:01  -  02-02-21 @ 14:15  --------------------------------------------------------  IN: 240 mL / OUT: 450 mL / NET: -210 mL      Physical Exam:  	Gen: NAD  	HEENT: MMM  	Pulm: CTA B/L  	CV: S1S2  	Abd: Soft, +BS  	Ext: No LE edema B/L                      Neuro: Awake   	Skin: Warm and Dry   	    LABS/STUDIES  --------------------------------------------------------------------------------              8.3    11.03 >-----------<  474      [02-02-21 @ 06:36]              27.4     137  |  101  |  103  ----------------------------<  117      [02-02-21 @ 06:36]  3.7   |  18  |  1.89        Ca     9.6     [02-02-21 @ 06:36]      Mg     2.5     [02-02-21 @ 06:36]      Phos  4.7     [02-02-21 @ 06:36]    Creatinine Trend:  SCr 1.89 [02-02 @ 06:36]  SCr 1.97 [02-01 @ 06:04]  SCr 1.98 [01-31 @ 07:29]  SCr 1.97 [01-30 @ 08:53]  SCr 2.04 [01-29 @ 07:10]    Urinalysis - [01-22-21 @ 22:32]      Color Light Yellow / Appearance Clear / SG 1.011 / pH 5.5      Gluc Trace / Ketone Negative  / Bili Negative / Urobili Negative       Blood Negative / Protein 30 mg/dL / Leuk Est Large / Nitrite Negative      RBC 2 / WBC 35 / Hyaline 1 / Gran  / Sq Epi  / Non Sq Epi 2 / Bacteria Negative      Ferritin 111      [01-13-21 @ 01:42]  HbA1c 7.5      [10-08-19 @ 14:30]  TSH 2.15      [01-13-21 @ 10:07]

## 2021-02-02 NOTE — PROGRESS NOTE ADULT - ASSESSMENT
76 yo F w/ PMH of CKD stage 4 (baseline Cr 1.9-2.2) HTN, T2DM, CAD s/p CABG X3 (2014 at [facility]), non-dilated ICM (EF 20-25%), severe mitral regurgitation s/p mitral clip (6/13/19), severe pulm HTN, hypothyroidism who presented for evaluation of SOB and was admitted for ADHF.    CKD stage 4  - baseline Cr 1.9-2.2; has had recurrent MERVIN's over the years  - CKD is likely 2/2 recurrent MERVIN's + underlying DM/HTN  - Scr stable today. BUN elevated due to diuretics.   - Pt on oral diuretics. Diuretics per heart failure team   - Pt cleared for d/c from nephrology. follow up in office advised  - Avoid hypotension   - renal sonogram in the past with simple renal cyst  - d/w daughter and son-in-law on 01/23 about worsening renal function and if no improvement likely need for dialysis. They were also informed that she will be a poor candidate for HD in view of CHF. They understand but wants HD if needed. Consent for HD is in chart.  - She is not uremic, monitor at present  - Avoid nephrotoxins including NSAIDs, IV contrast (if possible), [employer] products  - monitor I/O's accurately    Acidosis  serum bicarb below goal for CKD <21  Plan to start sodium bicarb 650mg PO daily     HTN  BP controlled  Low salt diet   MOnitor BP    Proteinuria/Hematuria  - likely from underlying DM  - has 1.8 grams proteinuria  - Hep B, Hep C, HIV RF, C3, C4, p-ANCA, c-ANCA, RPR neg  - weak gamma-migrating protein, weak IgG lambda protein band noted in the past. Pt to follow w/ heme   - DEAN 1:320 positive  - RPR neg, FTA +

## 2021-02-02 NOTE — PROGRESS NOTE ADULT - PROBLEM SELECTOR PLAN 3
Raised urticarial rash over UE and back   -Derm consulted, recs as below:  -clobetasol 0.05% ointment BID   -permethrin 5% cream, apply to whole body from neck down at bedtime x 1 night and rinse off in morning (8-10 hours later). Repeat again in 1 week  -symptoms improved CKD IV 2/2 recurrent AKIs, DM, HTN c/b metabolic acidosis  - will start bicarb 650 daily  - monitor for fluid overload

## 2021-02-02 NOTE — PROGRESS NOTE ADULT - PROBLEM SELECTOR PROBLEM 2
Hypothyroidism
Chronic systolic heart failure
Hypothyroidism
Nonrheumatic mitral valve regurgitation
Hypothyroidism
Nonrheumatic mitral valve regurgitation
Hypothyroidism
Nonrheumatic mitral valve regurgitation
Nonrheumatic mitral valve regurgitation
Hypothyroidism
Nonrheumatic mitral valve regurgitation
Nonrheumatic mitral valve regurgitation
Chronic systolic heart failure
Chronic systolic heart failure
Hypothyroidism
Chronic systolic heart failure
Hypothyroidism
Hypothyroidism
Nonrheumatic mitral valve regurgitation
Nonrheumatic mitral valve regurgitation
Chronic systolic heart failure
Nonrheumatic mitral valve regurgitation
Chronic systolic heart failure
Nonrheumatic mitral valve regurgitation
Nonrheumatic mitral valve regurgitation
Chronic systolic heart failure

## 2021-02-02 NOTE — PROGRESS NOTE ADULT - REASON FOR ADMISSION
increased work of breathing

## 2021-02-02 NOTE — PROGRESS NOTE ADULT - PROBLEM SELECTOR PROBLEM 5
H/O pulmonary hypertension Type 2 diabetes mellitus with diabetic nephropathy, with long-term current use of insulin

## 2021-02-02 NOTE — PROGRESS NOTE ADULT - PROBLEM SELECTOR PLAN 6
-Takes 60 units of lantus at bedtime and 18 units of humalog   - increased Lantus and Admelog based on sliding scale.  Will continue to titrate to achieve BS goal 140-180  - Endo consulted.  Started BID Lantus dosing 1/21  - pt to f/u w/ endo in 4 weeks post d/c w/ Dr. Jillian Banks -c/w Levothyroxine 50 mcg daily

## 2021-02-03 NOTE — PROGRESS NOTE ADULT - ASSESSMENT
74 y/o F w/ PMH of HTN, HLD, DM2, GERD, CAD s/p CABG (LIMA to LAD, SVG to OM, SVG to PDA; 2014), severe MR s/p MitraClip, severe pHTN, CKD4, hypoTH c/o dyspnea x 2 days suspect 2/2  NSTEMI +/- ADHF w/ e/o organ hypoperfusion.    #CV  Vessels - Elev troponin suspect possible NSTEMI. C/w ASA/clopidogrel. C/w hep gtt. C/w vol optimize. S/p IABP to improve coronary perfusion. Plan for LHC when Cr improves. LORENE score is 185. Would avoid hydral 2/2 possible steal. s/p nitro drip.   Pump - Severe reduced LVEF. Bumetanide gtt d/c'ed. Oscar in place. Strict Is/Os. PA Cath in place; serial perfusion labs.  Valves - S/p MitraClip. No active issues.  Rhythm - C/w monitor.    #Neuro - No active issues  #Resp - Off supplemental O2, sating well on RA. Vol optimize, as above.  #GI - Heart healthy diet. Trend liver tests, downtrending   #Endo - Trend BGFS on Lantus and Humalog. C/w home med Synthroid.   #Renal - Vol optimize, as above. MERVIN likely 2/2 acute congestion. continue to monitor creatinine now that vanco dc'd.   #ID - Leukocytosis. Sepsis w/u unremarkable thus far; c/w Cefepime given MERVIN.  #Hem - Full dose heparin, as above.  #Ethics - FC. C/w care in CCU 72 y/o F w/ PMH of HTN, HLD, DM2, GERD, CAD s/p CABG (LIMA to LAD, SVG to OM, SVG to PDA; 2014), severe MR s/p MitraClip, severe pHTN, CKD4, hypoTH c/o dyspnea x 2 days suspect 2/2  NSTEMI +/- ADHF w/ e/o organ hypoperfusion.    #CV  Vessels - Elev troponin suspect possible NSTEMI. C/w ASA/clopidogrel. C/w hep gtt. C/w vol optimize. S/p IABP to improve coronary perfusion. unlikely to undergo LHC in light of poor renal function, will cw medical mgmt for now.  Pump - Severe reduced LVEF. Bumetanide gtt d/c'ed. Oscar in place. Strict Is/Os. PA Cath in place; serial perfusion labs.  Valves - S/p MitraClip. No active issues.  Rhythm - C/w monitor.    #Neuro - No active issues  #Resp - Off supplemental O2, sating well on RA. Vol optimize, as above.  #GI - Heart healthy diet. Trend liver tests, downtrending   #Endo - Trend BGFS on Lantus and Humalog. endo recs appreciated/ C/w home med Synthroid.   #Renal - Vol optimize, as above. MERVIN likely 2/2 acute congestion. continue to monitor creatinine.    #ID - Leukocytosis. Sepsis w/u unremarkable thus far; c/w Cefepime given MERVIN, to complete 7 day course on 10/14  #Hem - Full dose heparin, as above.  #Ethics - FC. C/w care in CCU Benzoyl Peroxide Counseling: Patient counseled that medicine may cause skin irritation and bleach clothing.  In the event of skin irritation, the patient was advised to reduce the amount of the drug applied or use it less frequently.   The patient verbalized understanding of the proper use and possible adverse effects of benzoyl peroxide.  All of the patient's questions and concerns were addressed.

## 2021-03-05 NOTE — DISCHARGE NOTE PROVIDER - NSDCQMSTROKE_NEU_ALL_CORE
Department of Anesthesiology  Postprocedure Note    Patient: Jovon Euceda  MRN: [id number]  YOB: 1992  Date of evaluation: 3/5/2021  Time:  3:18 PM     Procedure Summary     Date: 03/05/21 Room / Location: [facility] 01 / [address]    Anesthesia Start: 4084 Anesthesia Stop: 2639    Procedure: EGD BIOPSY (N/A ) Diagnosis: (GERD)    Surgeons: Morena Yeh MD Responsible Provider: Tacho Cormier DO    Anesthesia Type: MAC ASA Status: 3          Anesthesia Type: MAC    Lavell Phase I: Lavell Score: 10    Lavell Phase II: Lavell Score: 10    Last vitals: Reviewed and per EMR flowsheets.        Anesthesia Post Evaluation    Patient location during evaluation: PACU  Patient participation: complete - patient participated  Level of consciousness: awake and alert  Airway patency: patent  Nausea & Vomiting: no nausea and no vomiting  Complications: no  Cardiovascular status: hemodynamically stable  Respiratory status: acceptable  Hydration status: euvolemic No

## 2021-04-16 ENCOUNTER — INPATIENT (INPATIENT)
Facility: HOSPITAL | Age: 76
LOS: 17 days | Discharge: HOME | DRG: 291 | End: 2021-05-04
Attending: INTERNAL MEDICINE | Admitting: INTERNAL MEDICINE
Payer: MEDICARE

## 2021-04-16 VITALS
SYSTOLIC BLOOD PRESSURE: 122 MMHG | RESPIRATION RATE: 35 BRPM | HEIGHT: 59 IN | OXYGEN SATURATION: 100 % | HEART RATE: 81 BPM | DIASTOLIC BLOOD PRESSURE: 80 MMHG

## 2021-04-16 DIAGNOSIS — Z95.1 PRESENCE OF AORTOCORONARY BYPASS GRAFT: Chronic | ICD-10-CM

## 2021-04-16 DIAGNOSIS — Z98.890 OTHER SPECIFIED POSTPROCEDURAL STATES: Chronic | ICD-10-CM

## 2021-04-16 LAB
ALBUMIN SERPL ELPH-MCNC: 3.5 G/DL — SIGNIFICANT CHANGE UP (ref 3.3–5)
ALP SERPL-CCNC: 155 U/L — HIGH (ref 40–120)
ALT FLD-CCNC: 19 U/L — SIGNIFICANT CHANGE UP (ref 10–45)
ANION GAP SERPL CALC-SCNC: 14 MMOL/L — SIGNIFICANT CHANGE UP (ref 5–17)
APTT BLD: 48.2 SEC — HIGH (ref 27.5–35.5)
AST SERPL-CCNC: 41 U/L — HIGH (ref 10–40)
BASE EXCESS BLDV CALC-SCNC: -5.2 MMOL/L — LOW (ref -2–2)
BASOPHILS # BLD AUTO: 0.02 K/UL — SIGNIFICANT CHANGE UP (ref 0–0.2)
BASOPHILS NFR BLD AUTO: 0.2 % — SIGNIFICANT CHANGE UP (ref 0–2)
BILIRUB SERPL-MCNC: 0.3 MG/DL — SIGNIFICANT CHANGE UP (ref 0.2–1.2)
BUN SERPL-MCNC: 52 MG/DL — HIGH (ref 7–23)
CA-I SERPL-SCNC: 1.17 MMOL/L — SIGNIFICANT CHANGE UP (ref 1.12–1.3)
CALCIUM SERPL-MCNC: 8.5 MG/DL — SIGNIFICANT CHANGE UP (ref 8.4–10.5)
CHLORIDE BLDV-SCNC: 113 MMOL/L — HIGH (ref 96–108)
CHLORIDE SERPL-SCNC: 107 MMOL/L — SIGNIFICANT CHANGE UP (ref 96–108)
CO2 BLDV-SCNC: 21 MMOL/L — LOW (ref 22–30)
CO2 SERPL-SCNC: 18 MMOL/L — LOW (ref 22–31)
CREAT SERPL-MCNC: 1.69 MG/DL — HIGH (ref 0.5–1.3)
EOSINOPHIL # BLD AUTO: 0.2 K/UL — SIGNIFICANT CHANGE UP (ref 0–0.5)
EOSINOPHIL NFR BLD AUTO: 2 % — SIGNIFICANT CHANGE UP (ref 0–6)
GAS PNL BLDV: 141 MMOL/L — SIGNIFICANT CHANGE UP (ref 135–145)
GAS PNL BLDV: SIGNIFICANT CHANGE UP
GAS PNL BLDV: SIGNIFICANT CHANGE UP
GLUCOSE BLDV-MCNC: 267 MG/DL — HIGH (ref 70–99)
GLUCOSE SERPL-MCNC: 274 MG/DL — HIGH (ref 70–99)
HCO3 BLDV-SCNC: 20 MMOL/L — LOW (ref 21–29)
HCT VFR BLD CALC: 32.5 % — LOW (ref 34.5–45)
HCT VFR BLDA CALC: 30 % — LOW (ref 39–50)
HGB BLD CALC-MCNC: 9.5 G/DL — LOW (ref 11.5–15.5)
HGB BLD-MCNC: 9.4 G/DL — LOW (ref 11.5–15.5)
IMM GRANULOCYTES NFR BLD AUTO: 0.9 % — SIGNIFICANT CHANGE UP (ref 0–1.5)
INR BLD: 1.19 RATIO — HIGH (ref 0.88–1.16)
LACTATE BLDV-MCNC: 1.7 MMOL/L — SIGNIFICANT CHANGE UP (ref 0.7–2)
LYMPHOCYTES # BLD AUTO: 1.29 K/UL — SIGNIFICANT CHANGE UP (ref 1–3.3)
LYMPHOCYTES # BLD AUTO: 12.6 % — LOW (ref 13–44)
MCHC RBC-ENTMCNC: 23 PG — LOW (ref 27–34)
MCHC RBC-ENTMCNC: 28.9 GM/DL — LOW (ref 32–36)
MCV RBC AUTO: 79.5 FL — LOW (ref 80–100)
MONOCYTES # BLD AUTO: 1.24 K/UL — HIGH (ref 0–0.9)
MONOCYTES NFR BLD AUTO: 12.2 % — SIGNIFICANT CHANGE UP (ref 2–14)
NEUTROPHILS # BLD AUTO: 7.36 K/UL — SIGNIFICANT CHANGE UP (ref 1.8–7.4)
NEUTROPHILS NFR BLD AUTO: 72.1 % — SIGNIFICANT CHANGE UP (ref 43–77)
NRBC # BLD: 0 /100 WBCS — SIGNIFICANT CHANGE UP (ref 0–0)
NT-PROBNP SERPL-SCNC: 6353 PG/ML — HIGH (ref 0–300)
PCO2 BLDV: 39 MMHG — SIGNIFICANT CHANGE UP (ref 39–42)
PH BLDV: 7.33 — LOW (ref 7.35–7.45)
PLATELET # BLD AUTO: 428 K/UL — HIGH (ref 150–400)
PO2 BLDV: 32 MMHG — SIGNIFICANT CHANGE UP (ref 25–45)
POTASSIUM BLDV-SCNC: 4.2 MMOL/L — SIGNIFICANT CHANGE UP (ref 3.5–5.3)
POTASSIUM SERPL-MCNC: 4.7 MMOL/L — SIGNIFICANT CHANGE UP (ref 3.5–5.3)
POTASSIUM SERPL-SCNC: 4.7 MMOL/L — SIGNIFICANT CHANGE UP (ref 3.5–5.3)
PROT SERPL-MCNC: 7.9 G/DL — SIGNIFICANT CHANGE UP (ref 6–8.3)
PROTHROM AB SERPL-ACNC: 14.2 SEC — HIGH (ref 10.6–13.6)
RBC # BLD: 4.09 M/UL — SIGNIFICANT CHANGE UP (ref 3.8–5.2)
RBC # FLD: 19.9 % — HIGH (ref 10.3–14.5)
SAO2 % BLDV: 47 % — LOW (ref 67–88)
SODIUM SERPL-SCNC: 139 MMOL/L — SIGNIFICANT CHANGE UP (ref 135–145)
TROPONIN T, HIGH SENSITIVITY RESULT: 49 NG/L — SIGNIFICANT CHANGE UP (ref 0–51)
WBC # BLD: 10.2 K/UL — SIGNIFICANT CHANGE UP (ref 3.8–10.5)
WBC # FLD AUTO: 10.2 K/UL — SIGNIFICANT CHANGE UP (ref 3.8–10.5)

## 2021-04-16 PROCEDURE — 99285 EMERGENCY DEPT VISIT HI MDM: CPT | Mod: CS,GC

## 2021-04-16 PROCEDURE — 93010 ELECTROCARDIOGRAM REPORT: CPT | Mod: GC

## 2021-04-16 PROCEDURE — 71045 X-RAY EXAM CHEST 1 VIEW: CPT | Mod: 26

## 2021-04-16 RX ORDER — BUMETANIDE 0.25 MG/ML
2 INJECTION INTRAMUSCULAR; INTRAVENOUS ONCE
Refills: 0 | Status: COMPLETED | OUTPATIENT
Start: 2021-04-16 | End: 2021-04-16

## 2021-04-16 RX ADMIN — BUMETANIDE 2 MILLIGRAM(S): 0.25 INJECTION INTRAMUSCULAR; INTRAVENOUS at 23:32

## 2021-04-16 NOTE — ED PROVIDER NOTE - OBJECTIVE STATEMENT
75F PMH HTN, HLD, DM2, CAD s/p CABG, ICM HFrEF (EF 20-25%, LVEDD 4.7 cm), severe mitral regurgitation s/p mitral clip, severe pulmonary HTN, CKD IV, hypothyroidism presents with chest pain.  Pt was recently discharged from [facility]. She was at home with family and since has been having worsening sob and chest pain. States CP in middle of chest, worse with inspiration, has been going on for multiple weeks but past few days worsened.

## 2021-04-16 NOTE — ED PROVIDER NOTE - NS ED ROS FT
REVIEW OF SYSTEMS:  General:  no fever, no chills  HEENT: no headache, no vision changes  Cardiac: +chest pain, no palpitations  Respiratory: no cough, +shortness of breath  Gastrointestinal: no abdominal pain, no nausea, no vomiting, no diarrhea  Genitourinary: no hematuria, no dysuria, no urinary frequency  Extremities: no extremity swelling, no extremity pain  Neuro: no focal weakness, no numbness/tingling of the extremities, no decreased sensation  Heme: no easy bleeding, no easy bruising  Skin: no jaundice,  no rashes, no lesions  All other ROS as documented in HPI  -Kevin Quiros, PGY-3

## 2021-04-16 NOTE — ED ADULT NURSE NOTE - OBJECTIVE STATEMENT
74y/o female with history of CAD, DM, pulmonary HTN, heart failure, BIBEMS, a&ox3 c/o chest pain. Patient unable to provide full history due to severe pain and SOB. States she has been having worsening mid sternal chest pain x2 days, unable to take deep breaths due to pain. Patient tachypneic, nasal cannula in place, uses 3L at baseline. Patient with b/l lower extremity 3+ pitting edema. Lungs clear b/l. Abd soft, nontender. CUADRA x4 equal in strength b/l. EKG done, 2 IVs placed. MD Tomlin at bedside for eval. 76y/o female with history of CAD, CABG, DM2, pulmonary HTN, heart failure, HLD, mitral regurgitation w/mitral clip, BIBEMS, a&ox3 c/o chest pain. Patient unable to provide full history due to severe pain and SOB. States she has been having worsening mid sternal chest pain x2 days, unable to take deep breaths due to pain. Patient tachypneic, nasal cannula in place, uses 3L at baseline. Patient with b/l lower extremity 3+ pitting edema. Lungs clear b/l. Abd soft, nontender. CUADRA x4 equal in strength b/l. EKG done, 2 IVs placed. MD Cerda at bedside for eval.

## 2021-04-16 NOTE — ED PROVIDER NOTE - PROGRESS NOTE DETAILS
PGY3/MD Srinivasan. water retension, xray with b/l lung edema and pleural effusion, lasxi, and bumex was given. admission to dr. santiago

## 2021-04-16 NOTE — ED PROVIDER NOTE - ATTENDING CONTRIBUTION TO CARE
Patient with chronic severe heart failure presenting with worsening chest pains and dyspnea at home.  Likely exacerbation of heart failure, will work up for same, start IV diuresis, likely admission.

## 2021-04-16 NOTE — ED PROVIDER NOTE - CLINICAL SUMMARY MEDICAL DECISION MAKING FREE TEXT BOX
75F presents with worsening CP and SOB, appears somewhat fluid overloaded. Will check labs, ekg, IV bumex to diuresis, likely will require admission for CHF exacerbation.

## 2021-04-16 NOTE — ED PROVIDER NOTE - PMH
CAD (coronary artery disease)  s/p CABG and PCI  Diabetes mellitus, type 2    H/O pulmonary hypertension    Heart failure    Hyperlipidemia    Hypothyroidism

## 2021-04-16 NOTE — ED PROVIDER NOTE - PHYSICAL EXAMINATION
General: Elderly female, uncomfortable appearing  HEENT: Normocephalic and atraumatic, Trachea midline.   Cardiac: Normal S1 and S2 w/ RRR. No MRG.  Pulmonary: mild bibasilar rales. tachypneic.   Abdominal: Soft, NTND  Neurologic: No focal sensory or motor deficits.  Musculoskeletal: No limited ROM.  Vascular: 2+ pitting edema to BL LE. Warm and well perfused  Skin: Color appropriate for race.   Psychiatric: Appropriate mood and affect. No apparent risk to self or others.  Kevin Quiros M.D. PGY-3

## 2021-04-16 NOTE — ED ADULT NURSE NOTE - NSIMPLEMENTINTERV_GEN_ALL_ED
Implemented All Fall with Harm Risk Interventions:  [place] to call system. Call bell, personal items and telephone within reach. Instruct patient to call for assistance. Room bathroom lighting operational. Non-slip footwear when patient is off stretcher. Physically safe environment: no spills, clutter or unnecessary equipment. Stretcher in lowest position, wheels locked, appropriate side rails in place. Provide visual cue, wrist band, yellow gown, etc. Monitor gait and stability. Monitor for mental status changes and reorient to person, place, and time. Review medications for side effects contributing to fall risk. Reinforce activity limits and safety measures with patient and family. Provide visual clues: red socks.

## 2021-04-16 NOTE — ED ADULT TRIAGE NOTE - HEIGHT IN INCHES
11 Alert and oriented to person, place, time/situation. normal mood and affect. no apparent risk to self or others.

## 2021-04-17 DIAGNOSIS — E11.9 TYPE 2 DIABETES MELLITUS WITHOUT COMPLICATIONS: ICD-10-CM

## 2021-04-17 DIAGNOSIS — Z02.9 ENCOUNTER FOR ADMINISTRATIVE EXAMINATIONS, UNSPECIFIED: ICD-10-CM

## 2021-04-17 DIAGNOSIS — I50.9 HEART FAILURE, UNSPECIFIED: ICD-10-CM

## 2021-04-17 DIAGNOSIS — R06.00 DYSPNEA, UNSPECIFIED: ICD-10-CM

## 2021-04-17 DIAGNOSIS — I50.20 UNSPECIFIED SYSTOLIC (CONGESTIVE) HEART FAILURE: ICD-10-CM

## 2021-04-17 DIAGNOSIS — E03.9 HYPOTHYROIDISM, UNSPECIFIED: ICD-10-CM

## 2021-04-17 DIAGNOSIS — N18.9 CHRONIC KIDNEY DISEASE, UNSPECIFIED: ICD-10-CM

## 2021-04-17 DIAGNOSIS — J96.01 ACUTE RESPIRATORY FAILURE WITH HYPOXIA: ICD-10-CM

## 2021-04-17 DIAGNOSIS — I27.20 PULMONARY HYPERTENSION, UNSPECIFIED: ICD-10-CM

## 2021-04-17 PROBLEM — Z86.79 PERSONAL HISTORY OF OTHER DISEASES OF THE CIRCULATORY SYSTEM: Chronic | Status: ACTIVE | Noted: 2021-01-13

## 2021-04-17 LAB
ALBUMIN SERPL ELPH-MCNC: 3.1 G/DL — LOW (ref 3.3–5)
ALP SERPL-CCNC: 143 U/L — HIGH (ref 40–120)
ALT FLD-CCNC: 18 U/L — SIGNIFICANT CHANGE UP (ref 10–45)
ANION GAP SERPL CALC-SCNC: 13 MMOL/L — SIGNIFICANT CHANGE UP (ref 5–17)
ANION GAP SERPL CALC-SCNC: 15 MMOL/L — SIGNIFICANT CHANGE UP (ref 5–17)
AST SERPL-CCNC: 26 U/L — SIGNIFICANT CHANGE UP (ref 10–40)
BASE EXCESS BLDV CALC-SCNC: -2.7 MMOL/L — LOW (ref -2–2)
BILIRUB SERPL-MCNC: 0.3 MG/DL — SIGNIFICANT CHANGE UP (ref 0.2–1.2)
BUN SERPL-MCNC: 49 MG/DL — HIGH (ref 7–23)
BUN SERPL-MCNC: 52 MG/DL — HIGH (ref 7–23)
CA-I SERPL-SCNC: 1.17 MMOL/L — SIGNIFICANT CHANGE UP (ref 1.12–1.3)
CALCIUM SERPL-MCNC: 8.4 MG/DL — SIGNIFICANT CHANGE UP (ref 8.4–10.5)
CALCIUM SERPL-MCNC: 9.1 MG/DL — SIGNIFICANT CHANGE UP (ref 8.4–10.5)
CHLORIDE BLDV-SCNC: 113 MMOL/L — HIGH (ref 96–108)
CHLORIDE SERPL-SCNC: 110 MMOL/L — HIGH (ref 96–108)
CHLORIDE SERPL-SCNC: 110 MMOL/L — HIGH (ref 96–108)
CO2 BLDV-SCNC: 22 MMOL/L — SIGNIFICANT CHANGE UP (ref 22–30)
CO2 SERPL-SCNC: 17 MMOL/L — LOW (ref 22–31)
CO2 SERPL-SCNC: 21 MMOL/L — LOW (ref 22–31)
CREAT SERPL-MCNC: 1.47 MG/DL — HIGH (ref 0.5–1.3)
CREAT SERPL-MCNC: 1.54 MG/DL — HIGH (ref 0.5–1.3)
GAS PNL BLDV: 141 MMOL/L — SIGNIFICANT CHANGE UP (ref 135–145)
GAS PNL BLDV: SIGNIFICANT CHANGE UP
GLUCOSE BLDC GLUCOMTR-MCNC: 104 MG/DL — HIGH (ref 70–99)
GLUCOSE BLDC GLUCOMTR-MCNC: 221 MG/DL — HIGH (ref 70–99)
GLUCOSE BLDC GLUCOMTR-MCNC: 227 MG/DL — HIGH (ref 70–99)
GLUCOSE BLDC GLUCOMTR-MCNC: 234 MG/DL — HIGH (ref 70–99)
GLUCOSE BLDC GLUCOMTR-MCNC: 243 MG/DL — HIGH (ref 70–99)
GLUCOSE BLDC GLUCOMTR-MCNC: 243 MG/DL — HIGH (ref 70–99)
GLUCOSE BLDC GLUCOMTR-MCNC: 54 MG/DL — CRITICAL LOW (ref 70–99)
GLUCOSE BLDC GLUCOMTR-MCNC: 57 MG/DL — LOW (ref 70–99)
GLUCOSE BLDV-MCNC: 347 MG/DL — HIGH (ref 70–99)
GLUCOSE SERPL-MCNC: 253 MG/DL — HIGH (ref 70–99)
GLUCOSE SERPL-MCNC: 74 MG/DL — SIGNIFICANT CHANGE UP (ref 70–99)
HCO3 BLDV-SCNC: 21 MMOL/L — SIGNIFICANT CHANGE UP (ref 21–29)
HCT VFR BLD CALC: 31.7 % — LOW (ref 34.5–45)
HCT VFR BLDA CALC: 26 % — LOW (ref 39–50)
HGB BLD CALC-MCNC: 8.5 G/DL — LOW (ref 11.5–15.5)
HGB BLD-MCNC: 9 G/DL — LOW (ref 11.5–15.5)
LACTATE BLDV-MCNC: 1.5 MMOL/L — SIGNIFICANT CHANGE UP (ref 0.7–2)
MAGNESIUM SERPL-MCNC: 2 MG/DL — SIGNIFICANT CHANGE UP (ref 1.6–2.6)
MCHC RBC-ENTMCNC: 22.6 PG — LOW (ref 27–34)
MCHC RBC-ENTMCNC: 28.4 GM/DL — LOW (ref 32–36)
MCV RBC AUTO: 79.4 FL — LOW (ref 80–100)
NRBC # BLD: 0 /100 WBCS — SIGNIFICANT CHANGE UP (ref 0–0)
OTHER CELLS CSF MANUAL: 12 ML/DL — LOW (ref 18–22)
PCO2 BLDV: 35 MMHG — LOW (ref 39–42)
PH BLDV: 7.4 — SIGNIFICANT CHANGE UP (ref 7.35–7.45)
PHOSPHATE SERPL-MCNC: 3.2 MG/DL — SIGNIFICANT CHANGE UP (ref 2.5–4.5)
PLATELET # BLD AUTO: 377 K/UL — SIGNIFICANT CHANGE UP (ref 150–400)
PO2 BLDV: 89 MMHG — HIGH (ref 25–45)
POTASSIUM BLDV-SCNC: 3.2 MMOL/L — LOW (ref 3.5–5.3)
POTASSIUM SERPL-MCNC: 3.3 MMOL/L — LOW (ref 3.5–5.3)
POTASSIUM SERPL-MCNC: 3.7 MMOL/L — SIGNIFICANT CHANGE UP (ref 3.5–5.3)
POTASSIUM SERPL-SCNC: 3.3 MMOL/L — LOW (ref 3.5–5.3)
POTASSIUM SERPL-SCNC: 3.7 MMOL/L — SIGNIFICANT CHANGE UP (ref 3.5–5.3)
PROT SERPL-MCNC: 7.2 G/DL — SIGNIFICANT CHANGE UP (ref 6–8.3)
RBC # BLD: 3.99 M/UL — SIGNIFICANT CHANGE UP (ref 3.8–5.2)
RBC # FLD: 19.7 % — HIGH (ref 10.3–14.5)
SAO2 % BLDV: 97 % — HIGH (ref 67–88)
SARS-COV-2 RNA SPEC QL NAA+PROBE: SIGNIFICANT CHANGE UP
SODIUM SERPL-SCNC: 142 MMOL/L — SIGNIFICANT CHANGE UP (ref 135–145)
SODIUM SERPL-SCNC: 144 MMOL/L — SIGNIFICANT CHANGE UP (ref 135–145)
TROPONIN T, HIGH SENSITIVITY RESULT: 42 NG/L — SIGNIFICANT CHANGE UP (ref 0–51)
TROPONIN T, HIGH SENSITIVITY RESULT: 51 NG/L — SIGNIFICANT CHANGE UP (ref 0–51)
TSH SERPL-MCNC: 1.94 UIU/ML — SIGNIFICANT CHANGE UP (ref 0.27–4.2)
WBC # BLD: 9.39 K/UL — SIGNIFICANT CHANGE UP (ref 3.8–10.5)
WBC # FLD AUTO: 9.39 K/UL — SIGNIFICANT CHANGE UP (ref 3.8–10.5)

## 2021-04-17 PROCEDURE — 12345: CPT | Mod: NC

## 2021-04-17 PROCEDURE — 93010 ELECTROCARDIOGRAM REPORT: CPT | Mod: 76

## 2021-04-17 PROCEDURE — 71045 X-RAY EXAM CHEST 1 VIEW: CPT | Mod: 26

## 2021-04-17 PROCEDURE — 99223 1ST HOSP IP/OBS HIGH 75: CPT | Mod: GC

## 2021-04-17 RX ORDER — BUMETANIDE 0.25 MG/ML
2 INJECTION INTRAMUSCULAR; INTRAVENOUS ONCE
Refills: 0 | Status: COMPLETED | OUTPATIENT
Start: 2021-04-17 | End: 2021-04-17

## 2021-04-17 RX ORDER — GLUCAGON INJECTION, SOLUTION 0.5 MG/.1ML
1 INJECTION, SOLUTION SUBCUTANEOUS ONCE
Refills: 0 | Status: DISCONTINUED | OUTPATIENT
Start: 2021-04-17 | End: 2021-04-22

## 2021-04-17 RX ORDER — INSULIN LISPRO 100/ML
16 VIAL (ML) SUBCUTANEOUS
Refills: 0 | Status: DISCONTINUED | OUTPATIENT
Start: 2021-04-17 | End: 2021-04-18

## 2021-04-17 RX ORDER — CLOPIDOGREL BISULFATE 75 MG/1
75 TABLET, FILM COATED ORAL DAILY
Refills: 0 | Status: DISCONTINUED | OUTPATIENT
Start: 2021-04-17 | End: 2021-05-04

## 2021-04-17 RX ORDER — SODIUM CHLORIDE 9 MG/ML
1000 INJECTION, SOLUTION INTRAVENOUS
Refills: 0 | Status: DISCONTINUED | OUTPATIENT
Start: 2021-04-17 | End: 2021-04-22

## 2021-04-17 RX ORDER — DEXTROSE 50 % IN WATER 50 %
25 SYRINGE (ML) INTRAVENOUS ONCE
Refills: 0 | Status: COMPLETED | OUTPATIENT
Start: 2021-04-17 | End: 2021-04-17

## 2021-04-17 RX ORDER — HYDRALAZINE HCL 50 MG
37.5 TABLET ORAL THREE TIMES A DAY
Refills: 0 | Status: DISCONTINUED | OUTPATIENT
Start: 2021-04-17 | End: 2021-04-28

## 2021-04-17 RX ORDER — ASPIRIN/CALCIUM CARB/MAGNESIUM 324 MG
81 TABLET ORAL DAILY
Refills: 0 | Status: DISCONTINUED | OUTPATIENT
Start: 2021-04-17 | End: 2021-05-04

## 2021-04-17 RX ORDER — SPIRONOLACTONE 25 MG/1
12 TABLET, FILM COATED ORAL DAILY
Refills: 0 | Status: DISCONTINUED | OUTPATIENT
Start: 2021-04-17 | End: 2021-04-17

## 2021-04-17 RX ORDER — INSULIN GLARGINE 100 [IU]/ML
20 INJECTION, SOLUTION SUBCUTANEOUS AT BEDTIME
Refills: 0 | Status: DISCONTINUED | OUTPATIENT
Start: 2021-04-17 | End: 2021-04-18

## 2021-04-17 RX ORDER — BUMETANIDE 0.25 MG/ML
2 INJECTION INTRAMUSCULAR; INTRAVENOUS
Refills: 0 | Status: DISCONTINUED | OUTPATIENT
Start: 2021-04-17 | End: 2021-04-23

## 2021-04-17 RX ORDER — INSULIN LISPRO 100/ML
VIAL (ML) SUBCUTANEOUS
Refills: 0 | Status: DISCONTINUED | OUTPATIENT
Start: 2021-04-17 | End: 2021-04-17

## 2021-04-17 RX ORDER — INSULIN LISPRO 100/ML
VIAL (ML) SUBCUTANEOUS
Refills: 0 | Status: DISCONTINUED | OUTPATIENT
Start: 2021-04-17 | End: 2021-04-29

## 2021-04-17 RX ORDER — INSULIN LISPRO 100/ML
VIAL (ML) SUBCUTANEOUS EVERY 6 HOURS
Refills: 0 | Status: DISCONTINUED | OUTPATIENT
Start: 2021-04-17 | End: 2021-04-17

## 2021-04-17 RX ORDER — HEPARIN SODIUM 5000 [USP'U]/ML
5000 INJECTION INTRAVENOUS; SUBCUTANEOUS EVERY 8 HOURS
Refills: 0 | Status: DISCONTINUED | OUTPATIENT
Start: 2021-04-17 | End: 2021-04-26

## 2021-04-17 RX ORDER — DEXTROSE 50 % IN WATER 50 %
25 SYRINGE (ML) INTRAVENOUS ONCE
Refills: 0 | Status: DISCONTINUED | OUTPATIENT
Start: 2021-04-17 | End: 2021-04-22

## 2021-04-17 RX ORDER — NITROGLYCERIN 6.5 MG
0.4 CAPSULE, EXTENDED RELEASE ORAL
Refills: 0 | Status: COMPLETED | OUTPATIENT
Start: 2021-04-17 | End: 2021-04-17

## 2021-04-17 RX ORDER — LEVOTHYROXINE SODIUM 125 MCG
50 TABLET ORAL DAILY
Refills: 0 | Status: DISCONTINUED | OUTPATIENT
Start: 2021-04-17 | End: 2021-05-04

## 2021-04-17 RX ORDER — DEXTROSE 50 % IN WATER 50 %
12.5 SYRINGE (ML) INTRAVENOUS ONCE
Refills: 0 | Status: DISCONTINUED | OUTPATIENT
Start: 2021-04-17 | End: 2021-04-22

## 2021-04-17 RX ORDER — FUROSEMIDE 40 MG
60 TABLET ORAL ONCE
Refills: 0 | Status: COMPLETED | OUTPATIENT
Start: 2021-04-17 | End: 2021-04-17

## 2021-04-17 RX ORDER — SPIRONOLACTONE 25 MG/1
12.5 TABLET, FILM COATED ORAL DAILY
Refills: 0 | Status: DISCONTINUED | OUTPATIENT
Start: 2021-04-17 | End: 2021-05-04

## 2021-04-17 RX ORDER — POLYETHYLENE GLYCOL 3350 17 G/17G
17 POWDER, FOR SOLUTION ORAL
Refills: 0 | Status: DISCONTINUED | OUTPATIENT
Start: 2021-04-17 | End: 2021-04-29

## 2021-04-17 RX ORDER — INSULIN GLARGINE 100 [IU]/ML
10 INJECTION, SOLUTION SUBCUTANEOUS EVERY MORNING
Refills: 0 | Status: DISCONTINUED | OUTPATIENT
Start: 2021-04-18 | End: 2021-04-18

## 2021-04-17 RX ORDER — INSULIN GLARGINE 100 [IU]/ML
45 INJECTION, SOLUTION SUBCUTANEOUS ONCE
Refills: 0 | Status: DISCONTINUED | OUTPATIENT
Start: 2021-04-17 | End: 2021-04-17

## 2021-04-17 RX ORDER — INSULIN GLARGINE 100 [IU]/ML
45 INJECTION, SOLUTION SUBCUTANEOUS AT BEDTIME
Refills: 0 | Status: DISCONTINUED | OUTPATIENT
Start: 2021-04-17 | End: 2021-04-17

## 2021-04-17 RX ORDER — SODIUM BICARBONATE 1 MEQ/ML
650 SYRINGE (ML) INTRAVENOUS DAILY
Refills: 0 | Status: DISCONTINUED | OUTPATIENT
Start: 2021-04-17 | End: 2021-05-04

## 2021-04-17 RX ORDER — INSULIN GLARGINE 100 [IU]/ML
50 INJECTION, SOLUTION SUBCUTANEOUS ONCE
Refills: 0 | Status: COMPLETED | OUTPATIENT
Start: 2021-04-17 | End: 2021-04-17

## 2021-04-17 RX ORDER — SENNA PLUS 8.6 MG/1
2 TABLET ORAL AT BEDTIME
Refills: 0 | Status: DISCONTINUED | OUTPATIENT
Start: 2021-04-17 | End: 2021-04-29

## 2021-04-17 RX ORDER — INSULIN LISPRO 100/ML
VIAL (ML) SUBCUTANEOUS AT BEDTIME
Refills: 0 | Status: DISCONTINUED | OUTPATIENT
Start: 2021-04-17 | End: 2021-04-17

## 2021-04-17 RX ORDER — MORPHINE SULFATE 50 MG/1
2 CAPSULE, EXTENDED RELEASE ORAL ONCE
Refills: 0 | Status: DISCONTINUED | OUTPATIENT
Start: 2021-04-17 | End: 2021-04-17

## 2021-04-17 RX ORDER — METOPROLOL TARTRATE 50 MG
100 TABLET ORAL DAILY
Refills: 0 | Status: DISCONTINUED | OUTPATIENT
Start: 2021-04-17 | End: 2021-04-20

## 2021-04-17 RX ORDER — DEXTROSE 50 % IN WATER 50 %
15 SYRINGE (ML) INTRAVENOUS ONCE
Refills: 0 | Status: DISCONTINUED | OUTPATIENT
Start: 2021-04-17 | End: 2021-04-22

## 2021-04-17 RX ORDER — LANOLIN ALCOHOL/MO/W.PET/CERES
3 CREAM (GRAM) TOPICAL AT BEDTIME
Refills: 0 | Status: DISCONTINUED | OUTPATIENT
Start: 2021-04-17 | End: 2021-04-29

## 2021-04-17 RX ORDER — ATORVASTATIN CALCIUM 80 MG/1
80 TABLET, FILM COATED ORAL AT BEDTIME
Refills: 0 | Status: DISCONTINUED | OUTPATIENT
Start: 2021-04-17 | End: 2021-04-29

## 2021-04-17 RX ORDER — INSULIN LISPRO 100/ML
VIAL (ML) SUBCUTANEOUS AT BEDTIME
Refills: 0 | Status: DISCONTINUED | OUTPATIENT
Start: 2021-04-17 | End: 2021-04-29

## 2021-04-17 RX ADMIN — Medication 4: at 09:08

## 2021-04-17 RX ADMIN — INSULIN GLARGINE 20 UNIT(S): 100 INJECTION, SOLUTION SUBCUTANEOUS at 22:38

## 2021-04-17 RX ADMIN — BUMETANIDE 2 MILLIGRAM(S): 0.25 INJECTION INTRAMUSCULAR; INTRAVENOUS at 17:52

## 2021-04-17 RX ADMIN — HEPARIN SODIUM 5000 UNIT(S): 5000 INJECTION INTRAVENOUS; SUBCUTANEOUS at 13:14

## 2021-04-17 RX ADMIN — MORPHINE SULFATE 2 MILLIGRAM(S): 50 CAPSULE, EXTENDED RELEASE ORAL at 15:23

## 2021-04-17 RX ADMIN — Medication 100 MILLIGRAM(S): at 08:25

## 2021-04-17 RX ADMIN — Medication 650 MILLIGRAM(S): at 11:38

## 2021-04-17 RX ADMIN — ATORVASTATIN CALCIUM 80 MILLIGRAM(S): 80 TABLET, FILM COATED ORAL at 21:47

## 2021-04-17 RX ADMIN — Medication 37.5 MILLIGRAM(S): at 13:14

## 2021-04-17 RX ADMIN — Medication 3 MILLIGRAM(S): at 21:41

## 2021-04-17 RX ADMIN — Medication 16 UNIT(S): at 09:08

## 2021-04-17 RX ADMIN — Medication 0.4 MILLIGRAM(S): at 15:39

## 2021-04-17 RX ADMIN — Medication 81 MILLIGRAM(S): at 11:38

## 2021-04-17 RX ADMIN — BUMETANIDE 2 MILLIGRAM(S): 0.25 INJECTION INTRAMUSCULAR; INTRAVENOUS at 15:02

## 2021-04-17 RX ADMIN — Medication 0.4 MILLIGRAM(S): at 15:23

## 2021-04-17 RX ADMIN — HEPARIN SODIUM 5000 UNIT(S): 5000 INJECTION INTRAVENOUS; SUBCUTANEOUS at 21:40

## 2021-04-17 RX ADMIN — Medication 50 MICROGRAM(S): at 08:25

## 2021-04-17 RX ADMIN — Medication 60 MILLIGRAM(S): at 01:53

## 2021-04-17 RX ADMIN — Medication 4: at 13:14

## 2021-04-17 RX ADMIN — SPIRONOLACTONE 12.5 MILLIGRAM(S): 25 TABLET, FILM COATED ORAL at 11:38

## 2021-04-17 RX ADMIN — Medication 25 GRAM(S): at 17:38

## 2021-04-17 RX ADMIN — Medication 37.5 MILLIGRAM(S): at 21:46

## 2021-04-17 RX ADMIN — SENNA PLUS 2 TABLET(S): 8.6 TABLET ORAL at 21:40

## 2021-04-17 RX ADMIN — CLOPIDOGREL BISULFATE 75 MILLIGRAM(S): 75 TABLET, FILM COATED ORAL at 11:38

## 2021-04-17 RX ADMIN — Medication 16 UNIT(S): at 13:14

## 2021-04-17 RX ADMIN — Medication 0.5 MILLIGRAM(S): at 22:37

## 2021-04-17 RX ADMIN — INSULIN GLARGINE 50 UNIT(S): 100 INJECTION, SOLUTION SUBCUTANEOUS at 08:05

## 2021-04-17 NOTE — H&P ADULT - PROBLEM SELECTOR PLAN 3
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

## 2021-04-17 NOTE — H&P ADULT - PROBLEM SELECTOR PLAN 2
TTE 1/13 showed EF of 20% with global LV and RV dysfunction w/ MS.   Repeat TTE 1/19 showed severely decreased LV fx and grossly decreased RV fx w/ EF 25-30%  - Heart failure and cardiology consult     - continue with home HF medications including metoprolol, hydralazine TTE 1/13 showed EF of 20% with global LV and RV dysfunction w/ MS.   Repeat TTE 1/19 showed severely decreased LV fx and grossly decreased RV fx w/ EF 25-30%  - Heart failure and cardiology consult     - continue with home HF medications including metoprolol, hydralazine  - start aldactone as recommended prior to dc from HF team

## 2021-04-17 NOTE — PROGRESS NOTE ADULT - ATTENDING COMMENTS
Patient was placed on BIPAP due to tachypnea , cont to monitor SPO2 and mentation.      Shanelle PaulMer[facility] West Hospitalist   [phone]

## 2021-04-17 NOTE — H&P ADULT - PROBLEM SELECTOR PLAN 1
Pt presented with dyspnea and found to requirng high amounts of oxygen in ED improved with diuresis. Per family, on o2 at home 2L. Likely 2/2 CHF exacerbation.  EF 20% w/ mitral stenosis.  CXR on admission showed bilateral predominantly lower lobe hazy opacities which is likely 2/2 fluid. No white count or clinical signs of pneumonia. Similar admission in January requiring milrinone assisted diuresis. Unstable angina also on differential    - bumex 2mg IV BID  - trend lytes BID while on high dose of diuretics  - HF consult  - TTE Pt presented with dyspnea and found to requiring high amounts of oxygen in ED improved with diuresis. Per family, on o2 at home 2L. Likely 2/2 CHF exacerbation.  EF 20% w/ mitral stenosis.  CXR on admission showed bilateral predominantly lower lobe hazy opacities which is likely 2/2 fluid. No white count or clinical signs of pneumonia. Similar admission in January requiring milrinone assisted diuresis. Unstable angina also on differential    - bumex 2mg IV BID  - trend lytes BID while on high dose of diuretics  - HF consult  - TTE

## 2021-04-17 NOTE — H&P ADULT - PROBLEM SELECTOR PLAN 4
Most likely due to LV failure   -repeat TTE shows mild pulm HTN w/ normal RV size but decreased function.

## 2021-04-17 NOTE — CONSULT NOTE ADULT - SUBJECTIVE AND OBJECTIVE BOX
HISTORY OF PRESENT ILLNESS: 75y.o F Armenian speaking h/o HTN, HLD, DM, CAD s/p CABG 2014 and prior PCI, severe mitral regurgitation (s/p mitral clip 2019), pulmonary HTN (TTE 2019), HF (EF 21 % June 2019), CKD IV not on dialysis (b/l SCr 2-2.2), hypothyroidism comes to the Ed with 7 days of SOB. Per chart review, patient recently admitted for hypoxic respiratory failure 2/2 acute on chronic HFrEF exacerbation in the setting of mitral stenosis c/b MERVIN on CKD.  Pt initially treated w/ IV diuresis but was started on milrinone drip after RHC showed persistent elevated filled pressures. She received IV diuresis, milrinone continued dyspnea despite clinical euvolemic status.  PRN xanax started for possible anxiety component.  Labs w/ intermittent elevations in WBC in the setting of asymptomatic bacteruria. Pt seen and examined at bedside with use of Armenian interpeter though answering questions in English. She states that she has had shortness of breath for 7 days associated with chest pain in the lower section of her chest. She does not have any fevers chills, nausea, vomiting, abdominal pain or dysuria. Pt was recently discharged from [facility]. She was at home with family and since has been having worsening sob and so they decided to take her to the emergency room.    PAST MEDICAL & SURGICAL HISTORY:  Diabetes mellitus, type 2    Hyperlipidemia    Hypothyroidism    CAD (coronary artery disease)  s/p CABG and PCI    Heart failure    H/O pulmonary hypertension    S/P CABG (coronary artery bypass graft)    S/P mitral valve clip implantation        [ ] Diabetes   [ ] Hypertension  [ ] Hyperlipidemia  [ ] CAD  [ ] PCI  [ ] CABG    PREVIOUS DIAGNOSTIC TESTING:    [ ] Echocardiogram:  [ ]  Catheterization:  [ ] Stress Test:  	    MEDICATIONS:  aspirin enteric coated 81 milliGRAM(s) Oral daily  buMETAnide Injectable 2 milliGRAM(s) IV Push two times a day  clopidogrel Tablet 75 milliGRAM(s) Oral daily  heparin   Injectable 5000 Unit(s) SubCutaneous every 8 hours  hydrALAZINE 37.5 milliGRAM(s) Oral three times a day  metoprolol succinate  milliGRAM(s) Oral daily  spironolactone 12.5 milliGRAM(s) Oral daily        melatonin 3 milliGRAM(s) Oral at bedtime    polyethylene glycol 3350 17 Gram(s) Oral two times a day PRN  senna 2 Tablet(s) Oral at bedtime    atorvastatin 80 milliGRAM(s) Oral at bedtime  dextrose 40% Gel 15 Gram(s) Oral once  dextrose 50% Injectable 25 Gram(s) IV Push once  dextrose 50% Injectable 12.5 Gram(s) IV Push once  dextrose 50% Injectable 25 Gram(s) IV Push once  glucagon  Injectable 1 milliGRAM(s) IntraMuscular once  insulin glargine Injectable (LANTUS) 45 Unit(s) SubCutaneous at bedtime  insulin lispro (ADMELOG) corrective regimen sliding scale   SubCutaneous three times a day before meals  insulin lispro (ADMELOG) corrective regimen sliding scale   SubCutaneous at bedtime  insulin lispro Injectable (ADMELOG) 16 Unit(s) SubCutaneous three times a day before meals  levothyroxine 50 MICROGram(s) Oral daily    dextrose 5%. 1000 milliLiter(s) IV Continuous <Continuous>  dextrose 5%. 1000 milliLiter(s) IV Continuous <Continuous>  sodium bicarbonate 650 milliGRAM(s) Oral daily      Allergies    azithromycin (Hives; Pruritus)    Intolerances        FAMILY HISTORY:  Family history of diabetes mellitus (Sibling)  brothers/sisters    Family history of hypertension (Sibling)  sister        SOCIAL HISTORY:    [ ] Non-smoker  [ ] Smoker  [ ] Alcohol      REVIEW OF SYSTEMS:  [ ]chest pain  [  ]shortness of breath  [  ]palpitations  [  ]syncope  [ ]near syncope [  ]diplopia  [  ]altered mental status   [  ]fevers  [ ]chills [ ]nausea  [ ]vomitting  [ ]abdominal pain  [ ]melena  [ ]BRBPR  [  ]epistaxis  [  ]rash  [  ]lower extremity edema      CONSTITUTIONAL: No fever, weight loss, or fatigue  EYES: No eye pain, visual disturbances, or discharge  ENMT:  No difficulty hearing, tinnitus, vertigo; No sinus or throat pain  NECK: No pain or stiffness  RESPIRATORY: No cough, wheezing, chills or hemoptysis; No Shortness of Breath  CARDIOVASCULAR: No chest pain, palpitations, passing out, dizziness, or leg swelling  GASTROINTESTINAL: No abdominal or epigastric pain. No nausea, vomiting, or hematemesis; No diarrhea or constipation. No melena or hematochezia.  GENITOURINARY: No dysuria, frequency, hematuria, or incontinence  NEUROLOGICAL: No headaches, memory loss, loss of strength, numbness, or tremors  SKIN: No itching, burning, rashes, or lesions   LYMPH Nodes: No enlarged glands  ENDOCRINE: No heat or cold intolerance; No hair loss  MUSCULOSKELETAL: No joint pain or swelling; No muscle, back, or extremity pain  PSYCHIATRIC: No depression, anxiety, mood swings, or difficulty sleeping  HEME/LYMPH: No easy bruising, or bleeding gums  ALLERY AND IMMUNOLOGIC: No hives or eczema	    [ ] All others negative	  [ ] Unable to obtain    PHYSICAL EXAM:  T(C): 36.5 (04-17-21 @ 05:29), Max: 36.6 (04-16-21 @ 22:55)  HR: 75 (04-17-21 @ 05:29) (74 - 87)  BP: 116/50 (04-17-21 @ 05:29) (116/50 - 134/82)  RR: 28 (04-17-21 @ 05:29) (23 - 35)  SpO2: 100% (04-17-21 @ 05:29) (98% - 100%)  Wt(kg): --  I&O's Summary    16 Apr 2021 07:01  -  17 Apr 2021 07:00  --------------------------------------------------------  IN: 300 mL / OUT: 0 mL / NET: 300 mL        Appearance: Normal	  HEENT:   Normal oral mucosa, PERRL, EOMI	  Lymphatic: No lymphadenopathy  Cardiovascular: Normal S1 S2, No JVD, No murmurs, No edema  Respiratory: Lungs clear to auscultation	  Psychiatry: A & O x 3, Mood & affect appropriate  Gastrointestinal:  Soft, Non-tender, + BS	  Skin: No rashes, No ecchymoses, No cyanosis	  Neurologic: Non-focal  Extremities: Normal range of motion, No clubbing, cyanosis or edema  Vascular: Peripheral pulses palpable 2+ bilaterally    TELEMETRY: 	    ECG:  	  Labs, Radiology, Cardiology, and Other Results: Personally reviewed labs.   Personally reviewed imaging.   Personally reviewed EKG.                           9.4    10.20 )-----------( 428      ( 16 Apr 2021 22:59 )             32.5       04-16    139  |  107  |  52<H>  ----------------------------<  274<H>  4.7   |  18<L>  |  1.69<H>    Ca    8.5      16 Apr 2021 22:59    TPro  7.9  /  Alb  3.5  /  TBili  0.3  /  DBili  x   /  AST  41<H>  /  ALT  19  /  AlkPhos  155<H>  04-16            LIVER FUNCTIONS - ( 16 Apr 2021 22:59 )  Alb: 3.5 g/dL / Pro: 7.9 g/dL / ALK PHOS: 155 U/L / ALT: 19 U/L / AST: 41 U/L / GGT: x             PT/INR - ( 16 Apr 2021 22:59 )   PT: 14.2 sec;   INR: 1.19 ratio         PTT - ( 16 Apr 2021 22:59 )  PTT:48.2 sec      ECG : NSR  88  , NAD   < from: TTE with Doppler (w/Cont) (01.13.21 @ 08:31) >    LVOT velocity time integral equals 15 cm.  Aortic Root: 3 cm.  Left Atrium: Mildly dilated left atrium.  LA volume index =  39 cc/m2.  Left Ventricle: Endocardial visualization enhanced with  intravenous injection of Ultrasonic Enhancing Agent  (Definity). Severe global left ventricular systolic  dysfunction with regional variation. No left ventricular  thrombus. Septal motion consistent with prior cardiac  surgery. Mild left ventricular enlargement.  Right Heart: Normal right atrium. Normal right ventricular  size with decreased right ventricular systolic function.  Tricuspid valve not well visualized, probably normal.  Mild-moderate tricuspid regurgitation. Normal pulmonic  valve. Mild pulmonic regurgitation.  Pericardium/Pleura: Normal pericardium with no pericardial  effusion.  Hemodynamic: Estimated right atrial pressure is 8 mm Hg.  Estimated right ventricular systolic pressure equals 49 mm  Hg, assuming right atrial pressure equals 8 mm Hg,  consistent with mild pulmonary hypertension.  ------------------------------------------------------------------------  Conclusions:  1. Tethered mitral leaflets. Patient status-post mitral  clip placement. Mild-moderate mitral regurgitation. Mean  transmitral valve gradient equals 7-8 mm Hg. (HRabout 80s  bpm)  2. Aortic valve not well visualized; appears to be a  calcified trileaflet valve with mildly decreased opening.  Minimal aortic regurgitation.  3. Endocardial visualization enhanced with intravenous  injection of Ultrasonic Enhancing Agent (Definity). Severe  global left ventricular systolic dysfunction with regional  variation. No left ventricular thrombus. Septal motion  consistent with prior cardiac surgery.  4. Normal right ventricular size with decreased right  ventricular systolic function.  5. Estimated pulmonary artery systolic pressure equals 49  mm Hg, assuming right atrial pressure equals 8 mm Hg,  consistent with mild pulmonary pressures.  *** Compared with echocardiogram of 10/13/2019, results are  similar on today's study. Mean transmitral gradient is  increased on today's study in setting of faster heart rate.    < end of copied text >          OTHER: 	  	  LABS:	 	    CARDIAC MARKERS:                            9.0    9.39  )-----------( 377      ( 17 Apr 2021 07:22 )             31.7     04-17    142  |  110<H>  |  52<H>  ----------------------------<  253<H>  3.7   |  17<L>  |  1.54<H>    Ca    8.4      17 Apr 2021 07:21    TPro  7.2  /  Alb  3.1<L>  /  TBili  0.3  /  DBili  x   /  AST  26  /  ALT  18  /  AlkPhos  143<H>  04-17    proBNP: Serum Pro-Brain Natriuretic Peptide: 6353 pg/mL (04-16 @ 22:59)    Lipid Profile:   HgA1c:   TSH: Thyroid Stimulating Hormone, Serum: 1.94 uIU/mL (04-17 @ 02:28)      ASSESSMENT/PLAN: 	75y.o F Armenian speaking h/o HTN, HLD, DM, CAD s/p CABG 2014 and prior PCI, severe mitral regurgitation (s/p mitral clip 2019), pulmonary HTN (TTE 2019), HF (EF 21 % June 2019), CKD IV not on dialysis (b/l SCr 2-2.2), hypothyroidism admitted for tachypnea 2/2 acute on chronic HFrEF exacerbation.     - cont Bumex , goal to keep net neg  - ECHO repeat pending .   - COnt hydralizine, Bb  - dapt / statin for prior stents   - Monitor creatine, watch lytes   - supp o2   - Cont tele   D/W Dr Connor       HISTORY OF PRESENT ILLNESS: 75y.o F Japanese speaking h/o HTN, HLD, DM, CAD s/p CABG 2014 and prior PCI, severe mitral regurgitation (s/p mitral clip 2019), pulmonary HTN (TTE 2019), HF (EF 21 % June 2019), CKD IV not on dialysis (b/l SCr 2-2.2), hypothyroidism comes to the Ed with 7 days of SOB. Per chart review, patient recently admitted for hypoxic respiratory failure 2/2 acute on chronic HFrEF exacerbation in the setting of mitral stenosis c/b MERVIN on CKD.  Pt initially treated w/ IV diuresis but was started on milrinone drip after RHC showed persistent elevated filled pressures. She received IV diuresis, milrinone continued dyspnea despite clinical euvolemic status.  PRN xanax started for possible anxiety component.  Labs w/ intermittent elevations in WBC in the setting of asymptomatic bacteruria. Pt seen and examined at bedside with use of Japanese interpeter though answering questions in English. She states that she has had shortness of breath for 7 days associated with chest pain in the lower section of her chest. She does not have any fevers chills, nausea, vomiting, abdominal pain or dysuria. Pt was recently discharged from [facility]. She was at home with family and since has been having worsening sob and so they decided to take her to the emergency room.    PAST MEDICAL & SURGICAL HISTORY:  Diabetes mellitus, type 2    Hyperlipidemia    Hypothyroidism    CAD (coronary artery disease)  s/p CABG and PCI    Heart failure    H/O pulmonary hypertension    S/P CABG (coronary artery bypass graft)    S/P mitral valve clip implantation      MEDICATIONS:  aspirin enteric coated 81 milliGRAM(s) Oral daily  buMETAnide Injectable 2 milliGRAM(s) IV Push two times a day  clopidogrel Tablet 75 milliGRAM(s) Oral daily  heparin   Injectable 5000 Unit(s) SubCutaneous every 8 hours  hydrALAZINE 37.5 milliGRAM(s) Oral three times a day  metoprolol succinate  milliGRAM(s) Oral daily  spironolactone 12.5 milliGRAM(s) Oral daily  melatonin 3 milliGRAM(s) Oral at bedtime  polyethylene glycol 3350 17 Gram(s) Oral two times a day PRN  senna 2 Tablet(s) Oral at bedtime  atorvastatin 80 milliGRAM(s) Oral at bedtime  dextrose 40% Gel 15 Gram(s) Oral once  dextrose 50% Injectable 25 Gram(s) IV Push once  dextrose 50% Injectable 12.5 Gram(s) IV Push once  dextrose 50% Injectable 25 Gram(s) IV Push once  glucagon  Injectable 1 milliGRAM(s) IntraMuscular once  insulin glargine Injectable (LANTUS) 45 Unit(s) SubCutaneous at bedtime  insulin lispro (ADMELOG) corrective regimen sliding scale   SubCutaneous three times a day before meals  insulin lispro (ADMELOG) corrective regimen sliding scale   SubCutaneous at bedtime  insulin lispro Injectable (ADMELOG) 16 Unit(s) SubCutaneous three times a day before meals  levothyroxine 50 MICROGram(s) Oral daily  dextrose 5%. 1000 milliLiter(s) IV Continuous <Continuous>  dextrose 5%. 1000 milliLiter(s) IV Continuous <Continuous>  sodium bicarbonate 650 milliGRAM(s) Oral daily    Allergies    azithromycin (Hives; Pruritus)    Intolerances    FAMILY HISTORY:  Family history of diabetes mellitus (Sibling)  brothers/sisters    Family history of hypertension (Sibling)  sister    SOCIAL HISTORY:    [x ] Non-smoker  [ ] Smoker  [ ] Alcohol    CONSTITUTIONAL: No fever, weight loss, or fatigue  EYES: No eye pain, visual disturbances, or discharge  ENMT:  No difficulty hearing, tinnitus, vertigo; No sinus or throat pain  NECK: No pain or stiffness  RESPIRATORY: No cough, wheezing, chills or hemoptysis; No Shortness of Breath  CARDIOVASCULAR: No chest pain, palpitations, passing out, dizziness, or leg swelling  GASTROINTESTINAL: No abdominal or epigastric pain. No nausea, vomiting, or hematemesis; No diarrhea or constipation. No melena or hematochezia.  GENITOURINARY: No dysuria, frequency, hematuria, or incontinence  NEUROLOGICAL: No headaches, memory loss, loss of strength, numbness, or tremors  SKIN: No itching, burning, rashes, or lesions   LYMPH Nodes: No enlarged glands  ENDOCRINE: No heat or cold intolerance; No hair loss  MUSCULOSKELETAL: No joint pain or swelling; No muscle, back, or extremity pain  PSYCHIATRIC: No depression, anxiety, mood swings, or difficulty sleeping  HEME/LYMPH: No easy bruising, or bleeding gums  ALLERY AND IMMUNOLOGIC: No hives or eczema	    [ ] All others negative	  [ x] Unable to obtain due to severe dyspnea on bipap    PHYSICAL EXAM:  T(C): 36.5 (04-17-21 @ 05:29), Max: 36.6 (04-16-21 @ 22:55)  HR: 75 (04-17-21 @ 05:29) (74 - 87)  BP: 116/50 (04-17-21 @ 05:29) (116/50 - 134/82)  RR: 28 (04-17-21 @ 05:29) (23 - 35)  SpO2: 100% (04-17-21 @ 05:29) (98% - 100%)  Wt(kg): --  I&O's Summary    16 Apr 2021 07:01  -  17 Apr 2021 07:00  --------------------------------------------------------  IN: 300 mL / OUT: 0 mL / NET: 300 mL      Appearance: small frame frail elderly woman, dyspneic and fatigued.	  HEENT:   Normal oral mucosa, PERRL, EOMI	  Lymphatic: No lymphadenopathy  Cardiovascular: Normal S1 S2, +JVD, No murmurs, trace ankle edema  Respiratory: Lungs with bilateral rales, inefficient respiratory pattern with belly-breathing	  Psychiatry: Alert, unable to discern mood/affect due to dyspnea at rest.  Gastrointestinal:  Soft, Non-tender, + BS	  Skin: No rashes, No ecchymoses, No cyanosis	  Neurologic: Non-focal  Extremities: Normal range of motion, No clubbing, cyanosis or edema  Vascular: Peripheral pulses palpable 2+ bilaterally    TELEMETRY: sinus    ECG:  sinus  Echo: Jan 2021 EF 25-30, limited echo s/p MitraClip.  CXR: Pulm Edema    ECG : NSR  88  , NAD     < from: TTE with Doppler (w/Cont) (01.13.21 @ 08:31) >    LVOT velocity time integral equals 15 cm.  Aortic Root: 3 cm.  Left Atrium: Mildly dilated left atrium.  LA volume index =  39 cc/m2.  Left Ventricle: Endocardial visualization enhanced with  intravenous injection of Ultrasonic Enhancing Agent  (Definity). Severe global left ventricular systolic  dysfunction with regional variation. No left ventricular  thrombus. Septal motion consistent with prior cardiac  surgery. Mild left ventricular enlargement.  Right Heart: Normal right atrium. Normal right ventricular  size with decreased right ventricular systolic function.  Tricuspid valve not well visualized, probably normal.  Mild-moderate tricuspid regurgitation. Normal pulmonic  valve. Mild pulmonic regurgitation.  Pericardium/Pleura: Normal pericardium with no pericardial  effusion.  Hemodynamic: Estimated right atrial pressure is 8 mm Hg.  Estimated right ventricular systolic pressure equals 49 mm  Hg, assuming right atrial pressure equals 8 mm Hg,  consistent with mild pulmonary hypertension.  ------------------------------------------------------------------------  Conclusions:  1. Tethered mitral leaflets. Patient status-post mitral  clip placement. Mild-moderate mitral regurgitation. Mean  transmitral valve gradient equals 7-8 mm Hg. (HRabout 80s  bpm)  2. Aortic valve not well visualized; appears to be a  calcified trileaflet valve with mildly decreased opening.  Minimal aortic regurgitation.  3. Endocardial visualization enhanced with intravenous  injection of Ultrasonic Enhancing Agent (Definity). Severe  global left ventricular systolic dysfunction with regional  variation. No left ventricular thrombus. Septal motion  consistent with prior cardiac surgery.  4. Normal right ventricular size with decreased right  ventricular systolic function.  5. Estimated pulmonary artery systolic pressure equals 49  mm Hg, assuming right atrial pressure equals 8 mm Hg,  consistent with mild pulmonary pressures.  *** Compared with echocardiogram of 10/13/2019, results are  similar on today's study. Mean transmitral gradient is  increased on today's study in setting of faster heart rate.    < end of copied text >                            9.0    9.39  )-----------( 377      ( 17 Apr 2021 07:22 )             31.7     04-17    142  |  110<H>  |  52<H>  ----------------------------<  253<H>  3.7   |  17<L>  |  1.54<H>    Ca    8.4      17 Apr 2021 07:21    TPro  7.2  /  Alb  3.1<L>  /  TBili  0.3  /  DBili  x   /  AST  26  /  ALT  18  /  AlkPhos  143<H>  04-17    proBNP: Serum Pro-Brain Natriuretic Peptide: 6353 pg/mL (04-16 @ 22:59)    Lipid Profile:   HgA1c:   TSH: Thyroid Stimulating Hormone, Serum: 1.94 uIU/mL (04-17 @ 02:28)      ASSESSMENT/PLAN: 	75y.o F Japanese speaking h/o HTN, HLD, DM, CAD s/p CABG 2014 and prior PCI, severe mitral regurgitation (s/p mitral clip 2019), pulmonary HTN (TTE 2019), HF (EF 21 % June 2019), CKD IV not on dialysis (b/l SCr 2-2.2), hypothyroidism admitted for tachypnea 2/2 acute on chronic HFrEF exacerbation.     - cont Bumex , goal to keep net neg  - ECHO repeat pending .   - Cont hydralizine, Bb  - dapt / statin for prior stents   - Monitor creatine, watch lytes   - supp o2   - Cont tele   D/W Dr Connor

## 2021-04-17 NOTE — H&P ADULT - HISTORY OF PRESENT ILLNESS
PI: [id number]  75y.o F Korean speaking h/o HTN, HLD, DM, CAD s/p CABG 2014 and prior PCI, severe mitral regurgitation (s/p mitral clip 2019), pulmonary HTN (TTE 2019), HF (EF 21 % June 2019), CKD IV not on dialysis (b/l SCr 2-2.2), hypothyroidism comes to the Ed with 7 days of SOB. Per chart review, patient recently admitted for hypoxic respiratory failure 2/2 acute on chronic HFrEF exacerbation in the setting of mitral stenosis c/b MERVIN on CKD.  Pt initially treated w/ IV diuresis but was started on milrinone drip after RHC showed persistent elevated filled pressures. She received IV diuresis, milrinone continued dyspnea despite clinical euvolemic status.  PRN xanax started for possible anxiety component.  Labs w/ intermittent elevations in WBC in the setting of asymptomatic bacteruria. Pt seen and examined at bedside with use of Korean interpeter though answering questions in English. She states that she has had shortness of breath for 7 days associated with chest pain in the lower section of her chest. She does not have any fevers chills, nausea, vomiting, abdominal pain or dysuria. Pt was recently discharged from [facility]. She was at home with family and since has been having worsening sob and so they decided to take her to the emergency room.     In the ED she was hypoxic and clinically overloaded iso of elevated proBNP so was given IV diuresis with improvement in respiratory status.  PI: [id number]  75y.o F Wolof speaking h/o HTN, HLD, DM, CAD s/p CABG 2014 and prior PCI, severe mitral regurgitation (s/p mitral clip 2019), pulmonary HTN (TTE 2019), HF (EF 21 % June 2019), CKD IV not on dialysis (b/l SCr 2-2.2), hypothyroidism comes to the Ed with 7 days of SOB. Per chart review, patient recently admitted for hypoxic respiratory failure 2/2 acute on chronic HFrEF exacerbation in the setting of mitral stenosis c/b MERVIN on CKD.  Pt initially treated w/ IV diuresis but was started on milrinone drip after RHC showed persistent elevated filled pressures. She received IV diuresis, milrinone continued dyspnea despite clinical euvolemic status.  PRN xanax started for possible anxiety component.  Labs w/ intermittent elevations in WBC in the setting of asymptomatic bacteruria. Pt seen and examined at bedside with use of Wolof interpeter though answering questions in English. She states that she has had shortness of breath for 7 days associated with chest pain in the lower section of her chest. She does not have any fevers chills, nausea, vomiting, abdominal pain or dysuria. Pt was recently discharged from [facility]. She was at home with family and since has been having worsening sob and so they decided to take her to the emergency room.     In the ED she was tachypneic and clinically overloaded iso of elevated proBNP so was given IV diuresis with improvement in respiratory status.

## 2021-04-17 NOTE — H&P ADULT - ASSESSMENT
75y.o F Hebrew speaking h/o HTN, HLD, DM, CAD s/p CABG 2014 and prior PCI, severe mitral regurgitation (s/p mitral clip 2019), pulmonary HTN (TTE 2019), HF (EF 21 % June 2019), CKD IV not on dialysis (b/l SCr 2-2.2), hypothyroidism admitted for hypoxic respiratory failure 2/2 acute on chronic HFrEF exacerbation.  75y.o F Czech speaking h/o HTN, HLD, DM, CAD s/p CABG 2014 and prior PCI, severe mitral regurgitation (s/p mitral clip 2019), pulmonary HTN (TTE 2019), HF (EF 21 % June 2019), CKD IV not on dialysis (b/l SCr 2-2.2), hypothyroidism admitted for tachypnea 2/2 acute on chronic HFrEF exacerbation.

## 2021-04-17 NOTE — ED ADULT NURSE REASSESSMENT NOTE - NS ED NURSE REASSESS COMMENT FT1
Patient cleaned and provided with dry diaper and fresh linens. Primafit placed and attached to suction. Patient placed in position of comfort. Awaiting bed assignment.

## 2021-04-17 NOTE — PROGRESS NOTE ADULT - PROBLEM SELECTOR PLAN 1
Pt presented with dyspnea and found to requiring high amounts of oxygen in ED improved with diuresis. Per family, on o2 at home 2L. Likely 2/2 CHF exacerbation.  EF 20% w/ mitral stenosis.  CXR on admission showed bilateral predominantly lower lobe hazy opacities which is likely 2/2 fluid. No white count or clinical signs of pneumonia. Similar admission in January requiring milrinone assisted diuresis. Unstable angina also on differential    - bumex 2mg IV BID  - trend lytes BID while on high dose of diuretics  - HF consult  - TTE admitted with dyspnea, tachypneic to 30s, on non-rebreather,  Per family, on o2 at home 2L. Likely 2/2 CHF exacerbation.  EF 20% w/ mitral stenosis.  CXR on admission showed bilateral predominantly lower lobe hazy opacities which is likely 2/2 fluid. No white count or clinical signs of pneumonia. Similar admission in January requiring milrinone assisted diuresis. Unstable angina also on differential    - bumex 2mg IV BID  - trend lytes BID while on high dose of diuretics  - HF consult  - TTE - admitted with dyspnea, tachypneic to 30s, on non-rebreather, transitioned to NC, now on 4L NC spo2 99. resp rate 24.  - Per family, on o2 at home 2L. Likely 2/2 CHF exacerbation. CXR with pulm edema. BNP 6000 (though 16,000 in past HF admission). less likely ACS, trop 49 -> 42.   - last HF admission 1/2021, required milrinone assisted diuresis.   - s/p 2mg IV bumex and lasix IV 60mg, continue on bumex 2mg IV BID.   - continue hydral 37.5 TID (home dose) for afterload reduction, cont home metoprolol succinate 100mg QD. started on spironolactone 12.5mg QD (was rec to start on discharge last admission).   - Cr 1.54 (baseline last few months ~2).   - daily standing weight, strict I/O  - BMP BID to assess lytes  - HF following, f/u recs

## 2021-04-17 NOTE — H&P ADULT - PROBLEM SELECTOR PLAN 5
home regimen is Lantus to 45u at bedtime/25u AM; will resume this dose though missed yesterday evening.   Will continue Admelog 16u before each meal as well as Admelog correction scale coverage. Will continue monitoring FS and FU.  Patient counseled for compliance with consistent low carb diet and exercise as tolerated outpatient.

## 2021-04-17 NOTE — CONSULT NOTE ADULT - SUBJECTIVE AND OBJECTIVE BOX
[facility] [place]   MD RAHEEL SHAH MD RUORU WONG, PA    TEL:  OFFICE: [phone]  DR SANTOYO CELL: [phone]  JUSTEN KENDALL CELL: [phone]  DR. NGUYEN CELL: [phone]  DR. RIDER CELl: [phone]    FROM 5 PM- 7 AM PLEASE CALL ANSWERING SERVICE AT [phone]    -- INITIAL RENAL CONSULT NOTE --- Date Of service 04-17-21 @ 08:38  --------------------------------------------------------------------------------  HPI:  75y.o F Thai speaking h/o HTN, HLD, DM, CAD s/p CABG 2014 and prior PCI, severe mitral regurgitation (s/p mitral clip 2019), pulmonary HTN (TTE 2019), HF (EF 21 % June 2019), CKD IV not on dialysis (b/l SCr 2-2.2), hypothyroidism comes to the Ed with 7 days of SOB. Per chart review, patient recently admitted for hypoxic respiratory failure 2/2 acute on chronic HFrEF exacerbation in the setting of mitral stenosis c/b MERVIN on CKD.  Pt initially treated w/ IV diuresis but was started on milrinone drip after RHC showed persistent elevated filled pressures. She received IV diuresis, milrinone continued dyspnea despite clinical euvolemic status.  PRN xanax started for possible anxiety component.  Labs w/ intermittent elevations in WBC in the setting of asymptomatic bacteruria    Nephrology consutled for ckd. PT folllows with Dr Durant in clinic   PAST HISTORY  --------------------------------------------------------------------------------  PAST MEDICAL & SURGICAL HISTORY:  Diabetes mellitus, type 2    Hyperlipidemia    Hypothyroidism    CAD (coronary artery disease)  s/p CABG and PCI    Heart failure    H/O pulmonary hypertension    S/P CABG (coronary artery bypass graft)    S/P mitral valve clip implantation      FAMILY HISTORY:  Family history of diabetes mellitus (Sibling)  brothers/sisters    Family history of hypertension (Sibling)  sister      PAST SOCIAL HISTORY:    ALLERGIES & MEDICATIONS  --------------------------------------------------------------------------------  Allergies    azithromycin (Hives; Pruritus)    Intolerances      Standing Inpatient Medications  aspirin enteric coated 81 milliGRAM(s) Oral daily  atorvastatin 80 milliGRAM(s) Oral at bedtime  buMETAnide Injectable 2 milliGRAM(s) IV Push two times a day  clopidogrel Tablet 75 milliGRAM(s) Oral daily  dextrose 40% Gel 15 Gram(s) Oral once  dextrose 5%. 1000 milliLiter(s) IV Continuous <Continuous>  dextrose 5%. 1000 milliLiter(s) IV Continuous <Continuous>  dextrose 50% Injectable 25 Gram(s) IV Push once  dextrose 50% Injectable 12.5 Gram(s) IV Push once  dextrose 50% Injectable 25 Gram(s) IV Push once  glucagon  Injectable 1 milliGRAM(s) IntraMuscular once  heparin   Injectable 5000 Unit(s) SubCutaneous every 8 hours  hydrALAZINE 37.5 milliGRAM(s) Oral three times a day  insulin glargine Injectable (LANTUS) 45 Unit(s) SubCutaneous at bedtime  insulin lispro (ADMELOG) corrective regimen sliding scale   SubCutaneous three times a day before meals  insulin lispro (ADMELOG) corrective regimen sliding scale   SubCutaneous at bedtime  insulin lispro Injectable (ADMELOG) 16 Unit(s) SubCutaneous three times a day before meals  levothyroxine 50 MICROGram(s) Oral daily  melatonin 3 milliGRAM(s) Oral at bedtime  metoprolol succinate  milliGRAM(s) Oral daily  senna 2 Tablet(s) Oral at bedtime  sodium bicarbonate 650 milliGRAM(s) Oral daily  spironolactone 12.5 milliGRAM(s) Oral daily    PRN Inpatient Medications  polyethylene glycol 3350 17 Gram(s) Oral two times a day PRN      REVIEW OF SYSTEMS  --------------------------------------------------------------------------------  Gen: No fevers/chills  Skin: No rashes  Head/Eyes/Ears: Normal hearing,  Normal vision   Respiratory: + dyspnea, cough  CV: No chest pain  GI: No abdominal pain, diarrhea, constipation, nausea, vomiting  : No dysuria, hematuria  MSK: No  edema  Heme: No easy bruising or bleeding  Psych: No significant depression    All other systems were reviewed and are negative, except as noted.    VITALS/PHYSICAL EXAM  --------------------------------------------------------------------------------  T(C): 36.6 (04-17-21 @ 08:29), Max: 36.6 (04-16-21 @ 22:55)  HR: 76 (04-17-21 @ 08:29) (74 - 87)  BP: 136/81 (04-17-21 @ 08:29) (116/50 - 136/81)  RR: 24 (04-17-21 @ 08:29) (23 - 35)  SpO2: 99% (04-17-21 @ 08:29) (98% - 100%)  Wt(kg): --  Height (cm): 149.9 (04-16-21 @ 22:31)      04-16-21 @ 07:01  -  04-17-21 @ 07:00  --------------------------------------------------------  IN: 300 mL / OUT: 0 mL / NET: 300 mL      Physical Exam:  	Gen: NAD  	HEENT: MMM  	Pulm: decreased breath sounds  B/L  	CV: S1S2  	Abd: Soft, +BS   	Ext: No LE edema B/L  	Neuro: Awake, alert  	Skin: Warm and dry  	Vascular access: no HD catheter          : no cuellar  LABS/STUDIES  --------------------------------------------------------------------------------              9.0    9.39  >-----------<  377      [04-17-21 @ 07:22]              31.7     142  |  110  |  52  ----------------------------<  253      [04-17-21 @ 07:21]  3.7   |  17  |  1.54        Ca     8.4     [04-17-21 @ 07:21]    TPro  7.2  /  Alb  3.1  /  TBili  0.3  /  DBili  x   /  AST  26  /  ALT  18  /  AlkPhos  143  [04-17-21 @ 07:21]    PT/INR: PT 14.2 , INR 1.19       [04-16-21 @ 22:59]  PTT: 48.2       [04-16-21 @ 22:59]      Creatinine Trend:  SCr 1.54 [04-17 @ 07:21]  SCr 1.69 [04-16 @ 22:59]    Urinalysis - [01-22-21 @ 22:32]      Color Light Yellow / Appearance Clear / SG 1.011 / pH 5.5      Gluc Trace / Ketone Negative  / Bili Negative / Urobili Negative       Blood Negative / Protein 30 mg/dL / Leuk Est Large / Nitrite Negative      RBC 2 / WBC 35 / Hyaline 1 / Gran  / Sq Epi  / Non Sq Epi 2 / Bacteria Negative      Ferritin 111      [01-13-21 @ 01:42]  HbA1c 7.5      [10-08-19 @ 14:30]  TSH 1.94      [04-17-21 @ 02:28]    HBsAb Nonreact      [10-14-19 @ 09:34]  HBsAg Nonreact      [10-14-19 @ 09:34]  HCV 0.13, Nonreact      [10-14-19 @ 09:34]  HIV Nonreact      [10-14-19 @ 09:23]    EDAN: titer 1:320, pattern Homogeneous      [10-14-19 @ 09:23]  C3 Complement 111      [10-14-19 @ 09:35]  C4 Complement 39      [10-14-19 @ 09:35]  Rheumatoid Factor <10      [10-14-19 @ 09:30]  ANCA: cANCA Negative, pANCA Negative, atypical ANCA Indeterminate      [10-16-19 @ 10:46]  Syphilis Screen (Treponema Pallidum Ab) Positive      [10-14-19 @ 09:23]  Syphilis Screen (RPR Titer) <1:1      [10-14-19 @ 09:23]  Immunofixation Serum:   Weak IgG Lambda Band Identified    Reference Range: None Detected      [10-15-19 @ 14:35]  SPEP Interpretation: Weak Gamma-Migrating Paraprotein Identified      [10-15-19 @ 14:35]  Immunofixation Urine: Reference Range: None Detected      [10-15-19 @ 15:36]  UPEP Interpretation: Mild Selective (Glomerular) Proteinuria      [10-15-19 @ 15:36]  Cryoglobulin: Negative      [10-15-19 @ 14:51]

## 2021-04-17 NOTE — CONSULT NOTE ADULT - ATTENDING COMMENTS
seen and agree w/ PA.    needs aggressive diuresis re: pulmonary edema.  arterial BP is stable (110-130mmHg systolic).  consider up-titrating BIPAP support.  if still unwell tomorrow, low threshold to add Milrinone.  hopefully echo will guide next step in therapy.  will follow.

## 2021-04-17 NOTE — H&P ADULT - ATTENDING COMMENTS
75 yr old Wolof speaking female w/ hx of      presents w/ shortness of breath.  Recent admission earl this year for hypoxic respiratory failure secondary to HF exacerbation requiring milirnone assisted diuresis. Now endorses shortness of breath for 7 days, feels constantly short of breath and chest pain.   Now requiring assistance w/ ADLs.    Tachypneic and anxious at presentation  Noted to be tachypneic in ED so started on NRB. Now on 3L. 74 yo F PMH HTN, HLD, DM2, CAD s/p CABG, ICM HFrEF (EF 20-25%, LVEDD 4.7 cm), severe mitral regurgitation s/p mitral clip, severe pulmonary HTN, CKD IV, hypothyroidism presents with chest pain.  Pt was recently discharged from [facility]. She was at home with family and since then has been having worsening sob and chest pain. States CP in middle of chest, worse with inspiration, has been going on for multiple weeks but past few days worsened. Also endorse significant shortness of breath present at rest and minimal exertion such as moving around in bed. A    Recent admission earlier this year for hypoxic respiratory failure secondary to HF exacerbation requiring milrinone assisted diuresis. Discharged home on 2L NC.     Noted to be tachypneic in ED so started on NRB. Now on 4L.    #Acute systolic HF  CXR w/ venous congestion and R sided effusion  - diurese w/ bumex 2 mg IV bid, strict In/Out, low salt diet, daily weight  Optimize HF regimen as tolerated by BP  - start aldactone 12.5 qd; c/w metop succinate 100 mg and hydralazine 37.5 tid  - HF consult in am    Rest as above      Vikki Banks DO  Division of Hospital Medicine

## 2021-04-17 NOTE — CHART NOTE - NSCHARTNOTEFT_GEN_A_CORE
Called by nursing staff to evaluate patient who self-removed bipap. Briefly patient is 76yo female with severe MR s/p mitral clip, severe PH, and HFrEF (ef 20-25%) admitted for HF exacerbation. Patient resting comfortably in bed on non-rebreather. Lungs ctab, lower extremity without significant edema. Patient complaining of hunger, deferring replacement of bipap at this time. VBG reviewed 7.40/35/89.    Will order 0.5mg PO ativan for anxiety and reinstate diet. Explained to patient low threshold to restart bipap if she develops respiratory distress. Called by nursing staff to evaluate patient who self-removed bipap. Briefly patient is 76yo female with severe MR s/p mitral clip, severe PH, and HFrEF (ef 20-25%) admitted for HF exacerbation. Patient resting comfortably in bed on non-rebreather. Anterior lung fields clear to auscultation, posterior lung fields with bibasilar crackles, lower extremity without significant edema. Patient complaining of hunger, refusing replacement of bipap at this time. VBG reviewed 7.40/35/89. CXR reviewed with persistent R. pleural effusion, smaller when compared with previous exam dated 4/16.    Will order 0.5mg PO ativan for anxiety and reinstate diet. Explained to patient low threshold to restart bipap if she develops respiratory distress.

## 2021-04-17 NOTE — H&P ADULT - NSHPLABSRESULTS_GEN_ALL_CORE
< from: TTE with Doppler (w/Cont) (01.13.21 @ 08:31) >    LVOT velocity time integral equals 15 cm.  Aortic Root: 3 cm.  Left Atrium: Mildly dilated left atrium.  LA volume index =  39 cc/m2.  Left Ventricle: Endocardial visualization enhanced with  intravenous injection of Ultrasonic Enhancing Agent  (Definity). Severe global left ventricular systolic  dysfunction with regional variation. No left ventricular  thrombus. Septal motion consistent with prior cardiac  surgery. Mild left ventricular enlargement.  Right Heart: Normal right atrium. Normal right ventricular  size with decreased right ventricular systolic function.  Tricuspid valve not well visualized, probably normal.  Mild-moderate tricuspid regurgitation. Normal pulmonic  valve. Mild pulmonic regurgitation.  Pericardium/Pleura: Normal pericardium with no pericardial  effusion.  Hemodynamic: Estimated right atrial pressure is 8 mm Hg.  Estimated right ventricular systolic pressure equals 49 mm  Hg, assuming right atrial pressure equals 8 mm Hg,  consistent with mild pulmonary hypertension.  ------------------------------------------------------------------------  Conclusions:  1. Tethered mitral leaflets. Patient status-post mitral  clip placement. Mild-moderate mitral regurgitation. Mean  transmitral valve gradient equals 7-8 mm Hg. (HRabout 80s  bpm)  2. Aortic valve not well visualized; appears to be a  calcified trileaflet valve with mildly decreased opening.  Minimal aortic regurgitation.  3. Endocardial visualization enhanced with intravenous  injection of Ultrasonic Enhancing Agent (Definity). Severe  global left ventricular systolic dysfunction with regional  variation. No left ventricular thrombus. Septal motion  consistent with prior cardiac surgery.  4. Normal right ventricular size with decreased right  ventricular systolic function.  5. Estimated pulmonary artery systolic pressure equals 49  mm Hg, assuming right atrial pressure equals 8 mm Hg,  consistent with mild pulmonary pressures.  *** Compared with echocardiogram of 10/13/2019, results are  similar on today's study. Mean transmitral gradient is  increased on today's study in setting of faster heart rate.    < end of copied text > Personally reviewed labs.   Personally reviewed imaging.   Personally reviewed EKG.                           9.4    10.20 )-----------( 428      ( 16 Apr 2021 22:59 )             32.5       04-16    139  |  107  |  52<H>  ----------------------------<  274<H>  4.7   |  18<L>  |  1.69<H>    Ca    8.5      16 Apr 2021 22:59    TPro  7.9  /  Alb  3.5  /  TBili  0.3  /  DBili  x   /  AST  41<H>  /  ALT  19  /  AlkPhos  155<H>  04-16            LIVER FUNCTIONS - ( 16 Apr 2021 22:59 )  Alb: 3.5 g/dL / Pro: 7.9 g/dL / ALK PHOS: 155 U/L / ALT: 19 U/L / AST: 41 U/L / GGT: x             PT/INR - ( 16 Apr 2021 22:59 )   PT: 14.2 sec;   INR: 1.19 ratio         PTT - ( 16 Apr 2021 22:59 )  PTT:48.2 sec              < from: TTE with Doppler (w/Cont) (01.13.21 @ 08:31) >    LVOT velocity time integral equals 15 cm.  Aortic Root: 3 cm.  Left Atrium: Mildly dilated left atrium.  LA volume index =  39 cc/m2.  Left Ventricle: Endocardial visualization enhanced with  intravenous injection of Ultrasonic Enhancing Agent  (Definity). Severe global left ventricular systolic  dysfunction with regional variation. No left ventricular  thrombus. Septal motion consistent with prior cardiac  surgery. Mild left ventricular enlargement.  Right Heart: Normal right atrium. Normal right ventricular  size with decreased right ventricular systolic function.  Tricuspid valve not well visualized, probably normal.  Mild-moderate tricuspid regurgitation. Normal pulmonic  valve. Mild pulmonic regurgitation.  Pericardium/Pleura: Normal pericardium with no pericardial  effusion.  Hemodynamic: Estimated right atrial pressure is 8 mm Hg.  Estimated right ventricular systolic pressure equals 49 mm  Hg, assuming right atrial pressure equals 8 mm Hg,  consistent with mild pulmonary hypertension.  ------------------------------------------------------------------------  Conclusions:  1. Tethered mitral leaflets. Patient status-post mitral  clip placement. Mild-moderate mitral regurgitation. Mean  transmitral valve gradient equals 7-8 mm Hg. (HRabout 80s  bpm)  2. Aortic valve not well visualized; appears to be a  calcified trileaflet valve with mildly decreased opening.  Minimal aortic regurgitation.  3. Endocardial visualization enhanced with intravenous  injection of Ultrasonic Enhancing Agent (Definity). Severe  global left ventricular systolic dysfunction with regional  variation. No left ventricular thrombus. Septal motion  consistent with prior cardiac surgery.  4. Normal right ventricular size with decreased right  ventricular systolic function.  5. Estimated pulmonary artery systolic pressure equals 49  mm Hg, assuming right atrial pressure equals 8 mm Hg,  consistent with mild pulmonary pressures.  *** Compared with echocardiogram of 10/13/2019, results are  similar on today's study. Mean transmitral gradient is  increased on today's study in setting of faster heart rate.    < end of copied text >

## 2021-04-17 NOTE — PROGRESS NOTE ADULT - PROBLEM SELECTOR PLAN 2
TTE 1/13 showed EF of 20% with global LV and RV dysfunction w/ MS.   Repeat TTE 1/19 showed severely decreased LV fx and grossly decreased RV fx w/ EF 25-30%  - Heart failure and cardiology consult     - continue with home HF medications including metoprolol, hydralazine  - start aldactone as recommended prior to dc from HF team TTE 1/13 showed EF of 20% with global LV and RV dysfunction w/ MS.   Repeat TTE 1/19 showed severely decreased LV fx and grossly decreased RV fx w/ EF 25-30%  - Heart failure following - f/u recs  - continue with home HF medications including metoprolol, hydralazine  - start aldactone as recommended prior to dc from HF team

## 2021-04-17 NOTE — PROGRESS NOTE ADULT - ASSESSMENT
75y.o F Azeri speaking h/o HTN, HLD, DM, CAD s/p CABG 2014 and prior PCI, severe mitral regurgitation (s/p mitral clip 2019), pulmonary HTN (TTE 2019), HF (EF 21 % June 2019), CKD IV not on dialysis (b/l SCr 2-2.2), hypothyroidism admitted for tachypnea 2/2 acute on chronic HFrEF exacerbation.

## 2021-04-17 NOTE — PROGRESS NOTE ADULT - SUBJECTIVE AND OBJECTIVE BOX
Adrianna Vilchis PGY1    Patient is a 75y old  Female who presents with a chief complaint of dyspnea (17 Apr 2021 04:44)      SUBJECTIVE / OVERNIGHT EVENTS:    MEDICATIONS  (STANDING):  aspirin enteric coated 81 milliGRAM(s) Oral daily  atorvastatin 80 milliGRAM(s) Oral at bedtime  buMETAnide Injectable 2 milliGRAM(s) IV Push two times a day  clopidogrel Tablet 75 milliGRAM(s) Oral daily  dextrose 40% Gel 15 Gram(s) Oral once  dextrose 5%. 1000 milliLiter(s) (50 mL/Hr) IV Continuous <Continuous>  dextrose 5%. 1000 milliLiter(s) (100 mL/Hr) IV Continuous <Continuous>  dextrose 50% Injectable 25 Gram(s) IV Push once  dextrose 50% Injectable 12.5 Gram(s) IV Push once  dextrose 50% Injectable 25 Gram(s) IV Push once  glucagon  Injectable 1 milliGRAM(s) IntraMuscular once  heparin   Injectable 5000 Unit(s) SubCutaneous every 8 hours  hydrALAZINE 37.5 milliGRAM(s) Oral three times a day  insulin glargine Injectable (LANTUS) 45 Unit(s) SubCutaneous at bedtime  insulin glargine Injectable (LANTUS) 50 Unit(s) SubCutaneous once  insulin lispro (ADMELOG) corrective regimen sliding scale   SubCutaneous three times a day before meals  insulin lispro (ADMELOG) corrective regimen sliding scale   SubCutaneous at bedtime  insulin lispro Injectable (ADMELOG) 16 Unit(s) SubCutaneous three times a day before meals  levothyroxine 50 MICROGram(s) Oral daily  melatonin 3 milliGRAM(s) Oral at bedtime  metoprolol succinate  milliGRAM(s) Oral daily  senna 2 Tablet(s) Oral at bedtime  sodium bicarbonate 650 milliGRAM(s) Oral daily  spironolactone 12.5 milliGRAM(s) Oral daily    MEDICATIONS  (PRN):  polyethylene glycol 3350 17 Gram(s) Oral two times a day PRN Constipation      CAPILLARY BLOOD GLUCOSE      POCT Blood Glucose.: 259 mg/dL (16 Apr 2021 22:39)    I&O's Summary      Vital Signs Last 24 Hrs  T(C): 36.5 (17 Apr 2021 05:29), Max: 36.6 (16 Apr 2021 22:55)  T(F): 97.7 (17 Apr 2021 05:29), Max: 97.9 (17 Apr 2021 01:50)  HR: 75 (17 Apr 2021 05:29) (74 - 87)  BP: 116/50 (17 Apr 2021 05:29) (116/50 - 134/82)  BP(mean): --  RR: 28 (17 Apr 2021 05:29) (23 - 35)  SpO2: 100% (17 Apr 2021 05:29) (98% - 100%)    PHYSICAL EXAM:  GENERAL: no distress  PSYCH: A&O x3  HEAD: Atraumatic, Normocephalic  NECK: Supple, No JVD  CHEST/LUNG: clear to auscultation bilaterally  HEART: regular rate and rhythm, no murmurs  ABDOMEN: nontender to palpation, no rebound tenderness/guarding  EXTREMITIES: no edema on bilateral LE  NEUROLOGY: no focal neurologic deficit  SKIN: No rashes or lesions    LABS:                        9.4    10.20 )-----------( 428      ( 16 Apr 2021 22:59 )             32.5      04-16    139  |  107  |  52<H>  ----------------------------<  274<H>  4.7   |  18<L>  |  1.69<H>    Ca    8.5      16 Apr 2021 22:59    TPro  7.9  /  Alb  3.5  /  TBili  0.3  /  DBili  x   /  AST  41<H>  /  ALT  19  /  AlkPhos  155<H>  04-16    PT/INR - ( 16 Apr 2021 22:59 )   PT: 14.2 sec;   INR: 1.19 ratio         PTT - ( 16 Apr 2021 22:59 )  PTT:48.2 sec          RADIOLOGY & ADDITIONAL TESTS:    Imaging Personally Reviewed:    Consultant(s) Notes Reviewed:      Care Discussed with Consultants/Other Providers:   Adrianna Deng PGY1    Patient is a 75y old  Female who presents with a chief complaint of dyspnea (17 Apr 2021 04:44)      SUBJECTIVE / OVERNIGHT EVENTS:  - overnight - admitted for HF exacerbation  - am - sitting up, eating breakfast. on 4L NC. answers questions coherently. says she gets chest pain sometimes.     MEDICATIONS  (STANDING):  aspirin enteric coated 81 milliGRAM(s) Oral daily  atorvastatin 80 milliGRAM(s) Oral at bedtime  buMETAnide Injectable 2 milliGRAM(s) IV Push two times a day  clopidogrel Tablet 75 milliGRAM(s) Oral daily  dextrose 40% Gel 15 Gram(s) Oral once  dextrose 5%. 1000 milliLiter(s) (50 mL/Hr) IV Continuous <Continuous>  dextrose 5%. 1000 milliLiter(s) (100 mL/Hr) IV Continuous <Continuous>  dextrose 50% Injectable 25 Gram(s) IV Push once  dextrose 50% Injectable 12.5 Gram(s) IV Push once  dextrose 50% Injectable 25 Gram(s) IV Push once  glucagon  Injectable 1 milliGRAM(s) IntraMuscular once  heparin   Injectable 5000 Unit(s) SubCutaneous every 8 hours  hydrALAZINE 37.5 milliGRAM(s) Oral three times a day  insulin glargine Injectable (LANTUS) 45 Unit(s) SubCutaneous at bedtime  insulin glargine Injectable (LANTUS) 50 Unit(s) SubCutaneous once  insulin lispro (ADMELOG) corrective regimen sliding scale   SubCutaneous three times a day before meals  insulin lispro (ADMELOG) corrective regimen sliding scale   SubCutaneous at bedtime  insulin lispro Injectable (ADMELOG) 16 Unit(s) SubCutaneous three times a day before meals  levothyroxine 50 MICROGram(s) Oral daily  melatonin 3 milliGRAM(s) Oral at bedtime  metoprolol succinate  milliGRAM(s) Oral daily  senna 2 Tablet(s) Oral at bedtime  sodium bicarbonate 650 milliGRAM(s) Oral daily  spironolactone 12.5 milliGRAM(s) Oral daily    MEDICATIONS  (PRN):  polyethylene glycol 3350 17 Gram(s) Oral two times a day PRN Constipation      CAPILLARY BLOOD GLUCOSE      POCT Blood Glucose.: 259 mg/dL (16 Apr 2021 22:39)    I&O's Summary      Vital Signs Last 24 Hrs  T(C): 36.5 (17 Apr 2021 05:29), Max: 36.6 (16 Apr 2021 22:55)  T(F): 97.7 (17 Apr 2021 05:29), Max: 97.9 (17 Apr 2021 01:50)  HR: 75 (17 Apr 2021 05:29) (74 - 87)  BP: 116/50 (17 Apr 2021 05:29) (116/50 - 134/82)  BP(mean): --  RR: 28 (17 Apr 2021 05:29) (23 - 35)  SpO2: 100% (17 Apr 2021 05:29) (98% - 100%)    PHYSICAL EXAM:  GENERAL: no distress  PSYCH: A&O x3  HEAD: Atraumatic, Normocephalic  NECK: Supple, No JVD  CHEST/LUNG: clear to auscultation bilaterally  HEART: regular rate and rhythm, no murmurs  ABDOMEN: nontender to palpation, no rebound tenderness/guarding  EXTREMITIES: no edema on bilateral LE  NEUROLOGY: no focal neurologic deficit  SKIN: No rashes or lesions    LABS:                        9.4    10.20 )-----------( 428      ( 16 Apr 2021 22:59 )             32.5      04-16    139  |  107  |  52<H>  ----------------------------<  274<H>  4.7   |  18<L>  |  1.69<H>    Ca    8.5      16 Apr 2021 22:59    TPro  7.9  /  Alb  3.5  /  TBili  0.3  /  DBili  x   /  AST  41<H>  /  ALT  19  /  AlkPhos  155<H>  04-16    PT/INR - ( 16 Apr 2021 22:59 )   PT: 14.2 sec;   INR: 1.19 ratio         PTT - ( 16 Apr 2021 22:59 )  PTT:48.2 sec          RADIOLOGY & ADDITIONAL TESTS:    Imaging Personally Reviewed:    Consultant(s) Notes Reviewed:      Care Discussed with Consultants/Other Providers:

## 2021-04-17 NOTE — CONSULT NOTE ADULT - ASSESSMENT
75y.o F Latvian speaking h/o HTN, HLD, DM, CAD s/p CABG 2014 and prior PCI, severe mitral regurgitation (s/p mitral clip 2019), pulmonary HTN (TTE 2019), HF (EF 21 % June 2019), CKD IV not on dialysis (b/l SCr 2-2.2), hypothyroidism comes to the Ed with 7 days of SOB. Per chart review, patient recently admitted for hypoxic respiratory failure 2/2 acute on chronic HFrEF exacerbation in the setting of mitral stenosis c/b MERVIN on CKD.  Pt initially treated w/ IV diuresis but was started on milrinone drip after RHC showed persistent elevated filled pressures. She received IV diuresis, milrinone continued dyspnea despite clinical euvolemic status.  PRN xanax started for possible anxiety component.  Labs w/ intermittent elevations in WBC in the setting of asymptomatic bacteruria    CKD stage 4  - baseline Cr 1.9-2.2; has had recurrent MERVIN's over the years  - CKD is likely 2/2 recurrent MERVIN's + underlying DM/HTN  - Renal function stable   - Continue Diuretics per heart failure team   - Avoid hypotension   - Avoid nephrotoxins including NSAIDs, IV contrast (if possible), [employer] products  - monitor I/O's accurately    Acidosis  serum bicarb below goal for CKD   Continue sodium bicarb daily  MOnitor serum CO2    HTN  BP controlled  Low salt diet   MOnitor BP    SOB  likely sec to CHF  Follow up ECHO  Follow up Cardiology  Monitor daily weights

## 2021-04-17 NOTE — H&P ADULT - NSHPREVIEWOFSYSTEMS_GEN_ALL_CORE
REVIEW OF SYSTEMS:  CONSTITUTIONAL: No weakness. No fevers. No chills. No rigors. No weight loss. No night sweats. No poor appetite.  EYES: No blurry or double vision. No eye pain.  ENT: No hearing difficulty. No vertigo. No dysphagia. No sore throat. No Sinusitis/rhinorrhea.   NECK: No pain. No stiffness/rigidity.  CARDIAC: See HPI. No palpitations. No lightheadedness. No syncope.  RESPIRATORY: + SOB at rest and exertion. No hemoptysis.  GASTROINTESTINAL: No abdominal pain. No nausea. No vomiting. No hematemesis. No diarrhea. No constipation. No melena. No hematochezia.  GENITOURINARY: No dysuria. No frequency. No hesitancy. No hematuria. No oliguria.  NEUROLOGICAL: No numbness/tingling. No focal weakness. No urinary or fecal incontinence. No headache. No unsteady gait.  BACK: No back pain. No flank pain.  EXTREMITIES: No lower extremity edema. Full ROM. No joint pain.  SKIN: No rashes. No itching. No other lesions.  PSYCHIATRIC: No depression. No anxiety. No SI/HI.  ALLERGIC: No lip swelling. No hives.  All other review of systems is negative unless indicated above.  Unless indicated above, unable to assess ROS 2/2

## 2021-04-18 LAB
A1C WITH ESTIMATED AVERAGE GLUCOSE RESULT: 8.2 % — HIGH (ref 4–5.6)
ALBUMIN SERPL ELPH-MCNC: 3.2 G/DL — LOW (ref 3.3–5)
ALP SERPL-CCNC: 129 U/L — HIGH (ref 40–120)
ALT FLD-CCNC: 15 U/L — SIGNIFICANT CHANGE UP (ref 10–45)
ANION GAP SERPL CALC-SCNC: 13 MMOL/L — SIGNIFICANT CHANGE UP (ref 5–17)
ANION GAP SERPL CALC-SCNC: 14 MMOL/L — SIGNIFICANT CHANGE UP (ref 5–17)
AST SERPL-CCNC: 26 U/L — SIGNIFICANT CHANGE UP (ref 10–40)
BILIRUB SERPL-MCNC: 0.4 MG/DL — SIGNIFICANT CHANGE UP (ref 0.2–1.2)
BUN SERPL-MCNC: 46 MG/DL — HIGH (ref 7–23)
BUN SERPL-MCNC: 51 MG/DL — HIGH (ref 7–23)
CALCIUM SERPL-MCNC: 9.2 MG/DL — SIGNIFICANT CHANGE UP (ref 8.4–10.5)
CALCIUM SERPL-MCNC: 9.3 MG/DL — SIGNIFICANT CHANGE UP (ref 8.4–10.5)
CHLORIDE SERPL-SCNC: 106 MMOL/L — SIGNIFICANT CHANGE UP (ref 96–108)
CHLORIDE SERPL-SCNC: 112 MMOL/L — HIGH (ref 96–108)
CO2 SERPL-SCNC: 19 MMOL/L — LOW (ref 22–31)
CO2 SERPL-SCNC: 19 MMOL/L — LOW (ref 22–31)
COVID-19 SPIKE DOMAIN AB INTERP: NEGATIVE — SIGNIFICANT CHANGE UP
COVID-19 SPIKE DOMAIN ANTIBODY RESULT: 0.4 U/ML — SIGNIFICANT CHANGE UP
CREAT SERPL-MCNC: 1.5 MG/DL — HIGH (ref 0.5–1.3)
CREAT SERPL-MCNC: 1.58 MG/DL — HIGH (ref 0.5–1.3)
ESTIMATED AVERAGE GLUCOSE: 189 MG/DL — HIGH (ref 68–114)
GLUCOSE BLDC GLUCOMTR-MCNC: 111 MG/DL — HIGH (ref 70–99)
GLUCOSE BLDC GLUCOMTR-MCNC: 215 MG/DL — HIGH (ref 70–99)
GLUCOSE BLDC GLUCOMTR-MCNC: 230 MG/DL — HIGH (ref 70–99)
GLUCOSE BLDC GLUCOMTR-MCNC: 268 MG/DL — HIGH (ref 70–99)
GLUCOSE BLDC GLUCOMTR-MCNC: 273 MG/DL — HIGH (ref 70–99)
GLUCOSE BLDC GLUCOMTR-MCNC: 58 MG/DL — LOW (ref 70–99)
GLUCOSE BLDC GLUCOMTR-MCNC: 70 MG/DL — SIGNIFICANT CHANGE UP (ref 70–99)
GLUCOSE BLDC GLUCOMTR-MCNC: 72 MG/DL — SIGNIFICANT CHANGE UP (ref 70–99)
GLUCOSE BLDC GLUCOMTR-MCNC: 81 MG/DL — SIGNIFICANT CHANGE UP (ref 70–99)
GLUCOSE SERPL-MCNC: 260 MG/DL — HIGH (ref 70–99)
GLUCOSE SERPL-MCNC: 33 MG/DL — CRITICAL LOW (ref 70–99)
HCT VFR BLD CALC: 30.8 % — LOW (ref 34.5–45)
HGB BLD-MCNC: 8.7 G/DL — LOW (ref 11.5–15.5)
MAGNESIUM SERPL-MCNC: 2.1 MG/DL — SIGNIFICANT CHANGE UP (ref 1.6–2.6)
MCHC RBC-ENTMCNC: 22.5 PG — LOW (ref 27–34)
MCHC RBC-ENTMCNC: 28.2 GM/DL — LOW (ref 32–36)
MCV RBC AUTO: 79.6 FL — LOW (ref 80–100)
PHOSPHATE SERPL-MCNC: 3.2 MG/DL — SIGNIFICANT CHANGE UP (ref 2.5–4.5)
PLATELET # BLD AUTO: 401 K/UL — HIGH (ref 150–400)
POTASSIUM SERPL-MCNC: 3.9 MMOL/L — SIGNIFICANT CHANGE UP (ref 3.5–5.3)
POTASSIUM SERPL-MCNC: 5 MMOL/L — SIGNIFICANT CHANGE UP (ref 3.5–5.3)
POTASSIUM SERPL-SCNC: 3.9 MMOL/L — SIGNIFICANT CHANGE UP (ref 3.5–5.3)
POTASSIUM SERPL-SCNC: 5 MMOL/L — SIGNIFICANT CHANGE UP (ref 3.5–5.3)
PROT SERPL-MCNC: 7.3 G/DL — SIGNIFICANT CHANGE UP (ref 6–8.3)
RBC # BLD: 3.87 M/UL — SIGNIFICANT CHANGE UP (ref 3.8–5.2)
RBC # FLD: 19.8 % — HIGH (ref 10.3–14.5)
SARS-COV-2 IGG+IGM SERPL QL IA: 0.4 U/ML — SIGNIFICANT CHANGE UP
SARS-COV-2 IGG+IGM SERPL QL IA: NEGATIVE — SIGNIFICANT CHANGE UP
SODIUM SERPL-SCNC: 139 MMOL/L — SIGNIFICANT CHANGE UP (ref 135–145)
SODIUM SERPL-SCNC: 144 MMOL/L — SIGNIFICANT CHANGE UP (ref 135–145)
TROPONIN T, HIGH SENSITIVITY RESULT: 52 NG/L — HIGH (ref 0–51)
WBC # BLD: 12.62 K/UL — HIGH (ref 3.8–10.5)
WBC # FLD AUTO: 12.62 K/UL — HIGH (ref 3.8–10.5)

## 2021-04-18 PROCEDURE — 99233 SBSQ HOSP IP/OBS HIGH 50: CPT | Mod: GC

## 2021-04-18 RX ORDER — INSULIN GLARGINE 100 [IU]/ML
10 INJECTION, SOLUTION SUBCUTANEOUS AT BEDTIME
Refills: 0 | Status: DISCONTINUED | OUTPATIENT
Start: 2021-04-18 | End: 2021-04-20

## 2021-04-18 RX ORDER — POTASSIUM CHLORIDE 20 MEQ
40 PACKET (EA) ORAL EVERY 4 HOURS
Refills: 0 | Status: COMPLETED | OUTPATIENT
Start: 2021-04-18 | End: 2021-04-18

## 2021-04-18 RX ORDER — SODIUM CHLORIDE 9 MG/ML
1000 INJECTION, SOLUTION INTRAVENOUS
Refills: 0 | Status: DISCONTINUED | OUTPATIENT
Start: 2021-04-18 | End: 2021-04-27

## 2021-04-18 RX ORDER — PANTOPRAZOLE SODIUM 20 MG/1
40 TABLET, DELAYED RELEASE ORAL
Refills: 0 | Status: DISCONTINUED | OUTPATIENT
Start: 2021-04-18 | End: 2021-04-29

## 2021-04-18 RX ADMIN — CLOPIDOGREL BISULFATE 75 MILLIGRAM(S): 75 TABLET, FILM COATED ORAL at 11:56

## 2021-04-18 RX ADMIN — Medication 81 MILLIGRAM(S): at 11:56

## 2021-04-18 RX ADMIN — Medication 100 MILLIGRAM(S): at 06:00

## 2021-04-18 RX ADMIN — Medication 650 MILLIGRAM(S): at 11:56

## 2021-04-18 RX ADMIN — HEPARIN SODIUM 5000 UNIT(S): 5000 INJECTION INTRAVENOUS; SUBCUTANEOUS at 14:01

## 2021-04-18 RX ADMIN — ATORVASTATIN CALCIUM 80 MILLIGRAM(S): 80 TABLET, FILM COATED ORAL at 22:02

## 2021-04-18 RX ADMIN — INSULIN GLARGINE 10 UNIT(S): 100 INJECTION, SOLUTION SUBCUTANEOUS at 22:01

## 2021-04-18 RX ADMIN — Medication 37.5 MILLIGRAM(S): at 22:01

## 2021-04-18 RX ADMIN — BUMETANIDE 2 MILLIGRAM(S): 0.25 INJECTION INTRAMUSCULAR; INTRAVENOUS at 05:59

## 2021-04-18 RX ADMIN — Medication 37.5 MILLIGRAM(S): at 14:01

## 2021-04-18 RX ADMIN — SODIUM CHLORIDE 50 MILLILITER(S): 9 INJECTION, SOLUTION INTRAVENOUS at 08:33

## 2021-04-18 RX ADMIN — Medication 50 MICROGRAM(S): at 05:59

## 2021-04-18 RX ADMIN — SPIRONOLACTONE 12.5 MILLIGRAM(S): 25 TABLET, FILM COATED ORAL at 11:56

## 2021-04-18 RX ADMIN — Medication 3: at 18:51

## 2021-04-18 RX ADMIN — Medication 40 MILLIEQUIVALENT(S): at 01:50

## 2021-04-18 RX ADMIN — SENNA PLUS 2 TABLET(S): 8.6 TABLET ORAL at 22:02

## 2021-04-18 RX ADMIN — Medication 3 MILLIGRAM(S): at 22:02

## 2021-04-18 RX ADMIN — BUMETANIDE 2 MILLIGRAM(S): 0.25 INJECTION INTRAMUSCULAR; INTRAVENOUS at 17:51

## 2021-04-18 RX ADMIN — Medication 37.5 MILLIGRAM(S): at 06:04

## 2021-04-18 RX ADMIN — HEPARIN SODIUM 5000 UNIT(S): 5000 INJECTION INTRAVENOUS; SUBCUTANEOUS at 22:02

## 2021-04-18 RX ADMIN — HEPARIN SODIUM 5000 UNIT(S): 5000 INJECTION INTRAVENOUS; SUBCUTANEOUS at 05:59

## 2021-04-18 RX ADMIN — Medication 40 MILLIEQUIVALENT(S): at 06:00

## 2021-04-18 NOTE — PROVIDER CONTACT NOTE (CRITICAL VALUE NOTIFICATION) - ASSESSMENT
Patient A&Ox3 patient currently on continuous BIPAP. Patient is asymptomatic and denies any chest pain/palpatations. Denies headache or any vison changes. Bp

## 2021-04-18 NOTE — PROGRESS NOTE ADULT - SUBJECTIVE AND OBJECTIVE BOX
PROGRESS NOTE:   Authoted by Dr. Anna Marie Bailey MD  Pager [phone] [facility], [id number] [facility]     Patient is a 75y old  Female who presents with a chief complaint of dyspnea (17 Apr 2021 08:29)      SUBJECTIVE / OVERNIGHT EVENTS:     REVIEW OF SYSTEMS:    CONSTITUTIONAL: No weakness, fevers or chills  EYES/ENT: No visual changes;  No vertigo or throat pain   NECK: No pain or stiffness  RESPIRATORY: No cough, wheezing, hemoptysis; No shortness of breath  CARDIOVASCULAR: No chest pain or palpitations  GASTROINTESTINAL: No abdominal or epigastric pain. No nausea, vomiting, or hematemesis; No diarrhea or constipation. No melena or hematochezia.  GENITOURINARY: No dysuria, frequency or hematuria  NEUROLOGICAL: No numbness or weakness  SKIN: No itching, rashes    MEDICATIONS  (STANDING):  aspirin enteric coated 81 milliGRAM(s) Oral daily  atorvastatin 80 milliGRAM(s) Oral at bedtime  buMETAnide Injectable 2 milliGRAM(s) IV Push two times a day  clopidogrel Tablet 75 milliGRAM(s) Oral daily  dextrose 40% Gel 15 Gram(s) Oral once  dextrose 5%. 1000 milliLiter(s) (50 mL/Hr) IV Continuous <Continuous>  dextrose 5%. 1000 milliLiter(s) (100 mL/Hr) IV Continuous <Continuous>  dextrose 50% Injectable 25 Gram(s) IV Push once  dextrose 50% Injectable 12.5 Gram(s) IV Push once  dextrose 50% Injectable 25 Gram(s) IV Push once  glucagon  Injectable 1 milliGRAM(s) IntraMuscular once  heparin   Injectable 5000 Unit(s) SubCutaneous every 8 hours  hydrALAZINE 37.5 milliGRAM(s) Oral three times a day  insulin glargine Injectable (LANTUS) 20 Unit(s) SubCutaneous at bedtime  insulin glargine Injectable (LANTUS) 10 Unit(s) SubCutaneous every morning  insulin lispro (ADMELOG) corrective regimen sliding scale   SubCutaneous three times a day before meals  insulin lispro (ADMELOG) corrective regimen sliding scale   SubCutaneous at bedtime  insulin lispro Injectable (ADMELOG) 16 Unit(s) SubCutaneous three times a day before meals  levothyroxine 50 MICROGram(s) Oral daily  melatonin 3 milliGRAM(s) Oral at bedtime  metoprolol succinate  milliGRAM(s) Oral daily  senna 2 Tablet(s) Oral at bedtime  sodium bicarbonate 650 milliGRAM(s) Oral daily  spironolactone 12.5 milliGRAM(s) Oral daily    MEDICATIONS  (PRN):  polyethylene glycol 3350 17 Gram(s) Oral two times a day PRN Constipation      CAPILLARY BLOOD GLUCOSE      POCT Blood Glucose.: 104 mg/dL (17 Apr 2021 21:54)  POCT Blood Glucose.: 221 mg/dL (17 Apr 2021 17:58)  POCT Blood Glucose.: 227 mg/dL (17 Apr 2021 17:57)  POCT Blood Glucose.: 57 mg/dL (17 Apr 2021 17:29)  POCT Blood Glucose.: 54 mg/dL (17 Apr 2021 17:27)  POCT Blood Glucose.: 243 mg/dL (17 Apr 2021 12:40)  POCT Blood Glucose.: 234 mg/dL (17 Apr 2021 08:47)  POCT Blood Glucose.: 243 mg/dL (17 Apr 2021 07:38)    I&O's Summary    17 Apr 2021 07:01  -  18 Apr 2021 07:00  --------------------------------------------------------  IN: 600 mL / OUT: 2350 mL / NET: -1750 mL        PHYSICAL EXAM:  Vital Signs Last 24 Hrs  T(C): 36.4 (18 Apr 2021 04:25), Max: 36.7 (17 Apr 2021 11:32)  T(F): 97.5 (18 Apr 2021 04:25), Max: 98 (17 Apr 2021 11:32)  HR: 72 (18 Apr 2021 04:25) (72 - 82)  BP: 104/58 (18 Apr 2021 04:25) (103/53 - 137/79)  BP(mean): --  RR: 22 (18 Apr 2021 04:25) (22 - 30)  SpO2: 96% (18 Apr 2021 04:25) (96% - 100%)    CONSTITUTIONAL: NAD, well-developed  RESPIRATORY: Normal respiratory effort; lungs are clear to auscultation bilaterally  CARDIOVASCULAR: Regular rate and rhythm, normal S1 and S2, no murmur/rub/gallop; No lower extremity edema; Peripheral pulses are 2+ bilaterally  ABDOMEN: Nontender to palpation, normoactive bowel sounds, no rebound/guarding; No hepatosplenomegaly  MUSCLOSKELETAL: no clubbing or cyanosis of digits; no joint swelling or tenderness to palpation  PSYCH: A+O to person, place, and time; affect appropriate  NEURO: Non-focal, no tremors  SKIN: No rashes    LABS:                        9.0    9.39  )-----------( 377      ( 17 Apr 2021 07:22 )             31.7     04-17    144  |  110<H>  |  49<H>  ----------------------------<  74  3.3<L>   |  21<L>  |  1.47<H>    Ca    9.1      17 Apr 2021 23:08  Phos  3.2     04-17  Mg     2.0     04-17    TPro  7.2  /  Alb  3.1<L>  /  TBili  0.3  /  DBili  x   /  AST  26  /  ALT  18  /  AlkPhos  143<H>  04-17    PT/INR - ( 16 Apr 2021 22:59 )   PT: 14.2 sec;   INR: 1.19 ratio         PTT - ( 16 Apr 2021 22:59 )  PTT:48.2 sec            RADIOLOGY & ADDITIONAL TESTS:  No new imaging or tests    COORDINATION OF CARE:  Care Discussed with Consultants/Other Providers [Y/N]:  Prior or Outpatient Records Reviewed [Y/N]:   PROGRESS NOTE:   Authoted by Dr. Anna Marie Bailey MD  Pager [phone] [facility], [id number] [facility]     Patient is a 75y old  Female who presents with a chief complaint of dyspnea (17 Apr 2021 08:29)      SUBJECTIVE / OVERNIGHT EVENTS: Refused BiPAP overnight. Exam was benign, and was left on NC overnight.   Examed this morning, spoke via [facility]  [id number].   Patient was not clear about year and month, and not answering complex questions. Denied fever, CP, SOB, abdominal pain, or nausea.     MEDICATIONS  (STANDING):  aspirin enteric coated 81 milliGRAM(s) Oral daily  atorvastatin 80 milliGRAM(s) Oral at bedtime  buMETAnide Injectable 2 milliGRAM(s) IV Push two times a day  clopidogrel Tablet 75 milliGRAM(s) Oral daily  dextrose 40% Gel 15 Gram(s) Oral once  dextrose 5%. 1000 milliLiter(s) (50 mL/Hr) IV Continuous <Continuous>  dextrose 5%. 1000 milliLiter(s) (100 mL/Hr) IV Continuous <Continuous>  dextrose 50% Injectable 25 Gram(s) IV Push once  dextrose 50% Injectable 12.5 Gram(s) IV Push once  dextrose 50% Injectable 25 Gram(s) IV Push once  glucagon  Injectable 1 milliGRAM(s) IntraMuscular once  heparin   Injectable 5000 Unit(s) SubCutaneous every 8 hours  hydrALAZINE 37.5 milliGRAM(s) Oral three times a day  insulin glargine Injectable (LANTUS) 20 Unit(s) SubCutaneous at bedtime  insulin glargine Injectable (LANTUS) 10 Unit(s) SubCutaneous every morning  insulin lispro (ADMELOG) corrective regimen sliding scale   SubCutaneous three times a day before meals  insulin lispro (ADMELOG) corrective regimen sliding scale   SubCutaneous at bedtime  insulin lispro Injectable (ADMELOG) 16 Unit(s) SubCutaneous three times a day before meals  levothyroxine 50 MICROGram(s) Oral daily  melatonin 3 milliGRAM(s) Oral at bedtime  metoprolol succinate  milliGRAM(s) Oral daily  senna 2 Tablet(s) Oral at bedtime  sodium bicarbonate 650 milliGRAM(s) Oral daily  spironolactone 12.5 milliGRAM(s) Oral daily    MEDICATIONS  (PRN):  polyethylene glycol 3350 17 Gram(s) Oral two times a day PRN Constipation      CAPILLARY BLOOD GLUCOSE      POCT Blood Glucose.: 104 mg/dL (17 Apr 2021 21:54)  POCT Blood Glucose.: 221 mg/dL (17 Apr 2021 17:58)  POCT Blood Glucose.: 227 mg/dL (17 Apr 2021 17:57)  POCT Blood Glucose.: 57 mg/dL (17 Apr 2021 17:29)  POCT Blood Glucose.: 54 mg/dL (17 Apr 2021 17:27)  POCT Blood Glucose.: 243 mg/dL (17 Apr 2021 12:40)  POCT Blood Glucose.: 234 mg/dL (17 Apr 2021 08:47)  POCT Blood Glucose.: 243 mg/dL (17 Apr 2021 07:38)    I&O's Summary    17 Apr 2021 07:01  -  18 Apr 2021 07:00  --------------------------------------------------------  IN: 600 mL / OUT: 2350 mL / NET: -1750 mL        PHYSICAL EXAM:  Vital Signs Last 24 Hrs  T(C): 36.4 (18 Apr 2021 04:25), Max: 36.7 (17 Apr 2021 11:32)  T(F): 97.5 (18 Apr 2021 04:25), Max: 98 (17 Apr 2021 11:32)  HR: 72 (18 Apr 2021 04:25) (72 - 82)  BP: 104/58 (18 Apr 2021 04:25) (103/53 - 137/79)  BP(mean): --  RR: 22 (18 Apr 2021 04:25) (22 - 30)  SpO2: 96% (18 Apr 2021 04:25) (96% - 100%)    CONSTITUTIONAL: Moderate respiratory distress  RESPIRATORY: Moderate respiratory distress on NC; (+) abdominal breathing; lungs are clear to auscultation bilaterally except mild crackles bibasilar  CARDIOVASCULAR: Regular rate and rhythm, normal S1 and S2, no murmur/rub/gallop; 1+ lower extremity edema;   ABDOMEN: Nontender to palpation, normoactive bowel sounds  MUSCULOSKELETAL: no clubbing or cyanosis of digits; no joint swelling or tenderness to palpation  PSYCH: A+O to person and place  NEURO: Non-focal, no tremors  SKIN: No rashes    LABS:                        9.0    9.39  )-----------( 377      ( 17 Apr 2021 07:22 )             31.7     04-17    144  |  110<H>  |  49<H>  ----------------------------<  74  3.3<L>   |  21<L>  |  1.47<H>    Ca    9.1      17 Apr 2021 23:08  Phos  3.2     04-17  Mg     2.0     04-17    TPro  7.2  /  Alb  3.1<L>  /  TBili  0.3  /  DBili  x   /  AST  26  /  ALT  18  /  AlkPhos  143<H>  04-17    PT/INR - ( 16 Apr 2021 22:59 )   PT: 14.2 sec;   INR: 1.19 ratio         PTT - ( 16 Apr 2021 22:59 )  PTT:48.2 sec            RADIOLOGY & ADDITIONAL TESTS:  No new imaging or tests    COORDINATION OF CARE:  Care Discussed with Consultants/Other Providers [Y/N]: Cards  Prior or Outpatient Records Reviewed [Y/N]: N

## 2021-04-18 NOTE — PROGRESS NOTE ADULT - PROBLEM SELECTOR PLAN 5
home regimen is Lantus to 45u at bedtime/25u AM; will resume this dose though missed yesterday evening.   Will continue Admelog 16u before each meal as well as Admelog correction scale coverage. Will continue monitoring FS and FU.  Patient counseled for compliance with consistent low carb diet and exercise as tolerated outpatient. home regimen is Lantus to 45u at bedtime/25u AM  - Lantus and premeal d/c'ed 2/2 hypoglycemia while NPO;   - Will resume basal coverage when tolerating PO  - Patient counseled for compliance with consistent low carb diet and exercise as tolerated outpatient.

## 2021-04-18 NOTE — CHART NOTE - NSCHARTNOTEFT_GEN_A_CORE
Dr. Anna Marie Bailey MD  Internal Medicine, PGY-2  Pager # [phone] (NS) / [phone] (EMILIE)    Initiated GOC discussion with son over the phone. Mohsin Zaman [phone].    Explained about DNR/DNI. Son will discuss with family.  Per son, patient has become more symptomatic recently with recurrent hospitalizations. Family is concerned that she might be at the end of her life given the symptoms, and would like to have palliative consult to discuss about options. Per son, patient has become more confused, and may not be able to make complex medical decisions. He would like to be involved in the decision making for code status.     Son will speak with family, and then discuss with primary team and palliative team.

## 2021-04-18 NOTE — PROGRESS NOTE ADULT - ATTENDING COMMENTS
seen and agree w/ PA.  recovering from CHF exacerbation.  diuretics helping and to be continued.  off of BIPAP, but still very anxious- becomes tachypneic to the point of panic when anyone is in the room with her.

## 2021-04-18 NOTE — PROGRESS NOTE ADULT - SUBJECTIVE AND OBJECTIVE BOX
pt seen and examined, no complaints, ROS - .     aspirin enteric coated 81 milliGRAM(s) Oral daily  atorvastatin 80 milliGRAM(s) Oral at bedtime  buMETAnide Injectable 2 milliGRAM(s) IV Push two times a day  clopidogrel Tablet 75 milliGRAM(s) Oral daily  dextrose 40% Gel 15 Gram(s) Oral once  dextrose 5%. 1000 milliLiter(s) IV Continuous <Continuous>  dextrose 5%. 1000 milliLiter(s) IV Continuous <Continuous>  dextrose 5%. 1000 milliLiter(s) IV Continuous <Continuous>  dextrose 50% Injectable 25 Gram(s) IV Push once  dextrose 50% Injectable 12.5 Gram(s) IV Push once  dextrose 50% Injectable 25 Gram(s) IV Push once  glucagon  Injectable 1 milliGRAM(s) IntraMuscular once  heparin   Injectable 5000 Unit(s) SubCutaneous every 8 hours  hydrALAZINE 37.5 milliGRAM(s) Oral three times a day  insulin lispro (ADMELOG) corrective regimen sliding scale   SubCutaneous three times a day before meals  insulin lispro (ADMELOG) corrective regimen sliding scale   SubCutaneous at bedtime  levothyroxine 50 MICROGram(s) Oral daily  melatonin 3 milliGRAM(s) Oral at bedtime  metoprolol succinate  milliGRAM(s) Oral daily  polyethylene glycol 3350 17 Gram(s) Oral two times a day PRN  senna 2 Tablet(s) Oral at bedtime  sodium bicarbonate 650 milliGRAM(s) Oral daily  spironolactone 12.5 milliGRAM(s) Oral daily                            9.0    9.39  )-----------( 377      ( 17 Apr 2021 07:22 )             31.7       Hemoglobin: 9.0 g/dL (04-17 @ 07:22)  Hemoglobin: 9.4 g/dL (04-16 @ 22:59)      04-18    144  |  112<H>  |  46<H>  ----------------------------<  33<LL>  3.9   |  19<L>  |  1.50<H>    Ca    9.2      18 Apr 2021 07:15  Phos  3.2     04-18  Mg     2.1     04-18    TPro  7.3  /  Alb  3.2<L>  /  TBili  0.4  /  DBili  x   /  AST  26  /  ALT  15  /  AlkPhos  129<H>  04-18    Creatinine Trend: 1.50<--, 1.47<--, 1.54<--, 1.69<--    COAGS:           T(C): 36.4 (04-18-21 @ 04:25), Max: 36.7 (04-17-21 @ 11:32)  HR: 69 (04-18-21 @ 05:30) (69 - 82)  BP: 104/58 (04-18-21 @ 04:25) (103/53 - 137/79)  RR: 22 (04-18-21 @ 04:25) (22 - 30)  SpO2: 89% (04-18-21 @ 05:30) (89% - 100%)  Wt(kg): --    I&O's Summary    17 Apr 2021 07:01  -  18 Apr 2021 07:00  --------------------------------------------------------  IN: 600 mL / OUT: 2350 mL / NET: -1750 mL      ASSESSMENT/PLAN: 	75y.o F Japanese speaking h/o HTN, HLD, DM, CAD s/p CABG 2014 and prior PCI, severe mitral regurgitation (s/p mitral clip 2019), pulmonary HTN (TTE 2019), HF (EF 21 % June 2019), CKD IV not on dialysis (b/l SCr 2-2.2), hypothyroidism admitted for tachypnea 2/2 acute on chronic HFrEF exacerbation.     - cont Bumex/ aldactone  , goal to keep net neg  - ECHO repeat pending .   - Cont hydralizine, Bb  - dapt / statin for prior stents   - Monitor creatine, watch lytes   - supp o2 - Bipap support   - Cont tele

## 2021-04-18 NOTE — PROGRESS NOTE ADULT - ASSESSMENT
75y.o F Welsh speaking h/o HTN, HLD, DM, CAD s/p CABG 2014 and prior PCI, severe mitral regurgitation (s/p mitral clip 2019), pulmonary HTN (TTE 2019), HF (EF 21 % June 2019), CKD IV not on dialysis (b/l SCr 2-2.2), hypothyroidism comes to the Ed with 7 days of SOB. Per chart review, patient recently admitted for hypoxic respiratory failure 2/2 acute on chronic HFrEF exacerbation in the setting of mitral stenosis c/b MERVIN on CKD.  Pt initially treated w/ IV diuresis but was started on milrinone drip after RHC showed persistent elevated filled pressures. She received IV diuresis, milrinone continued dyspnea despite clinical euvolemic status.  PRN xanax started for possible anxiety component.  Labs w/ intermittent elevations in WBC in the setting of asymptomatic bacteruria    CKD stage 4  - baseline Cr 1.9-2.2; has had recurrent MERVIN's over the years  - CKD is likely 2/2 recurrent MERVIN's + underlying DM/HTN  - Renal function stable   - Continue Diuretics per cardiology  - Avoid hypotension   - Avoid nephrotoxins including NSAIDs, IV contrast (if possible), [employer] products  - monitor I/O's accurately    Acidosis  serum bicarb below goal for CKD   Continue sodium bicarb daily  MOnitor serum CO2    HTN  BP controlled  Low salt diet   MOnitor BP    SOB  likely sec to CHF  Follow up ECHO  Follow up Cardiology  Monitor daily weights     Hypokalemia  sec to diuretics  Repelte KCL 40mEQ toda   MOnitor serm K

## 2021-04-18 NOTE — PROGRESS NOTE ADULT - SUBJECTIVE AND OBJECTIVE BOX
[facility]   MD RAHEEL SHAH MD RUORU WONG, PA    TEL:  OFFICE: [phone]  DR SANTOYO CELL: [phone]  JUSTEN KENDALL CELL: [phone]  DR. NGUYEN CELL: [phone]  DR. RIDER CELL: [phone]    FROM 5 PM - 7 AM PLEASE CALL ANSWERING SERVICE: [phone]    RENAL FOLLOW UP NOTE--Date of Service 04-18-21 @ 07:46  --------------------------------------------------------------------------------  HPI:      Pt seen and examined at bedside.       PAST HISTORY  --------------------------------------------------------------------------------  No significant changes to PMH, PSH, FHx, SHx, unless otherwise noted    ALLERGIES & MEDICATIONS  --------------------------------------------------------------------------------  Allergies    azithromycin (Hives; Pruritus)    Intolerances      Standing Inpatient Medications  aspirin enteric coated 81 milliGRAM(s) Oral daily  atorvastatin 80 milliGRAM(s) Oral at bedtime  buMETAnide Injectable 2 milliGRAM(s) IV Push two times a day  clopidogrel Tablet 75 milliGRAM(s) Oral daily  dextrose 40% Gel 15 Gram(s) Oral once  dextrose 5%. 1000 milliLiter(s) IV Continuous <Continuous>  dextrose 5%. 1000 milliLiter(s) IV Continuous <Continuous>  dextrose 50% Injectable 25 Gram(s) IV Push once  dextrose 50% Injectable 12.5 Gram(s) IV Push once  dextrose 50% Injectable 25 Gram(s) IV Push once  glucagon  Injectable 1 milliGRAM(s) IntraMuscular once  heparin   Injectable 5000 Unit(s) SubCutaneous every 8 hours  hydrALAZINE 37.5 milliGRAM(s) Oral three times a day  insulin glargine Injectable (LANTUS) 20 Unit(s) SubCutaneous at bedtime  insulin glargine Injectable (LANTUS) 10 Unit(s) SubCutaneous every morning  insulin lispro (ADMELOG) corrective regimen sliding scale   SubCutaneous three times a day before meals  insulin lispro (ADMELOG) corrective regimen sliding scale   SubCutaneous at bedtime  insulin lispro Injectable (ADMELOG) 16 Unit(s) SubCutaneous three times a day before meals  levothyroxine 50 MICROGram(s) Oral daily  melatonin 3 milliGRAM(s) Oral at bedtime  metoprolol succinate  milliGRAM(s) Oral daily  senna 2 Tablet(s) Oral at bedtime  sodium bicarbonate 650 milliGRAM(s) Oral daily  spironolactone 12.5 milliGRAM(s) Oral daily    PRN Inpatient Medications  polyethylene glycol 3350 17 Gram(s) Oral two times a day PRN      REVIEW OF SYSTEMS  --------------------------------------------------------------------------------  General: no fever  CVS: no chest pain  RESP: improving sob, no cough  ABD: no abdominal pain  : no dysuria,  MSK: no edema     VITALS/PHYSICAL EXAM  --------------------------------------------------------------------------------  T(C): 36.4 (04-18-21 @ 04:25), Max: 36.7 (04-17-21 @ 11:32)  HR: 69 (04-18-21 @ 05:30) (69 - 82)  BP: 104/58 (04-18-21 @ 04:25) (103/53 - 137/79)  RR: 22 (04-18-21 @ 04:25) (22 - 30)  SpO2: 89% (04-18-21 @ 05:30) (89% - 100%)  Wt(kg): --  Height (cm): 149.9 (04-16-21 @ 22:31)      04-17-21 @ 07:01  -  04-18-21 @ 07:00  --------------------------------------------------------  IN: 600 mL / OUT: 2350 mL / NET: -1750 mL      Physical Exam:  	Gen: NAD  	HEENT: MMM  	Pulm: Crackles B/L  	CV: S1S2  	Abd: Soft, +BS  	Ext: No LE edema B/L                      Neuro: Awake   	Skin: Warm and Dry   	Vascular access:no hD catheter           no  polo  LABS/STUDIES  --------------------------------------------------------------------------------              9.0    9.39  >-----------<  377      [04-17-21 @ 07:22]              31.7     144  |  110  |  49  ----------------------------<  74      [04-17-21 @ 23:08]  3.3   |  21  |  1.47        Ca     9.1     [04-17-21 @ 23:08]      Mg     2.0     [04-17-21 @ 23:08]      Phos  3.2     [04-17-21 @ 23:08]    TPro  7.2  /  Alb  3.1  /  TBili  0.3  /  DBili  x   /  AST  26  /  ALT  18  /  AlkPhos  143  [04-17-21 @ 07:21]    PT/INR: PT 14.2 , INR 1.19       [04-16-21 @ 22:59]  PTT: 48.2       [04-16-21 @ 22:59]      Creatinine Trend:  SCr 1.47 [04-17 @ 23:08]  SCr 1.54 [04-17 @ 07:21]  SCr 1.69 [04-16 @ 22:59]        Ferritin 111      [01-13-21 @ 01:42]  HbA1c 7.5      [10-08-19 @ 14:30]  TSH 1.94      [04-17-21 @ 02:28]

## 2021-04-18 NOTE — PROGRESS NOTE ADULT - PROBLEM SELECTOR PLAN 2
TTE 1/13 showed EF of 20% with global LV and RV dysfunction w/ MS.   Repeat TTE 1/19 showed severely decreased LV fx and grossly decreased RV fx w/ EF 25-30%  - Heart failure following - f/u recs  - continue with home HF medications including metoprolol, hydralazine  - start aldactone as recommended prior to dc from HF team TTE 1/13 showed EF of 20% with global LV and RV dysfunction w/ MS.   Repeat TTE 1/19 showed severely decreased LV fx and grossly decreased RV fx w/ EF 25-30%  - Heart failure following - f/u recs  - continue with home HF medications including metoprolol, hydralazine  - On aldactone as recommended prior to dc from HF team

## 2021-04-18 NOTE — PROGRESS NOTE ADULT - PROBLEM SELECTOR PLAN 1
- admitted with dyspnea, tachypneic to 30s, on non-rebreather, transitioned to NC, now on 4L NC spo2 99. resp rate 24.  - Per family, on o2 at home 2L. Likely 2/2 CHF exacerbation. CXR with pulm edema. BNP 6000 (though 16,000 in past HF admission). less likely ACS, trop 49 -> 42.   - last HF admission 1/2021, required milrinone assisted diuresis.   - s/p 2mg IV bumex and lasix IV 60mg, continue on bumex 2mg IV BID.   - continue hydral 37.5 TID (home dose) for afterload reduction, cont home metoprolol succinate 100mg QD. started on spironolactone 12.5mg QD (was rec to start on discharge last admission).   - Cr 1.54 (baseline last few months ~2).   - daily standing weight, strict I/O  - BMP BID to assess lytes  - HF following, f/u recs - admitted with dyspnea, tachypneic to 30s, on non-rebreather, transitioned to NC, now on 4L NC spo2 99. resp rate 24.  - Per family, on o2 at home 2L. Likely 2/2 CHF exacerbation. CXR with pulm edema. BNP 6000 (though 16,000 in past HF admission). less likely ACS, trop 49 -> 42.   - last HF admission 1/2021, required milrinone assisted diuresis.   - s/p 2mg IV bumex and lasix IV 60mg, continue on bumex 2mg IV BID.   - continue hydral 37.5 TID (home dose) for afterload reduction, cont home metoprolol succinate 100mg QD. started on spironolactone 12.5mg QD (was rec to start on discharge last admission).   - Cr 1.47 (baseline last few months ~2).   - daily standing weight, strict I/O  - BMP BID to assess lytes  - HF following, f/u recs  - Patient was back on BiPAP for a few hours in AM with improvement in respiratory status after MS improvement. Trial off BiPAP with 94% on NC; continue to monitor.

## 2021-04-18 NOTE — PROGRESS NOTE ADULT - ATTENDING COMMENTS
76yo F Swedish speaking h/o HTN, HLD, DM, CAD s/p CABG 2014 and prior PCI, severe mitral regurgitation (s/p mitral clip 2019), pulmonary HTN (TTE 2019), HF (EF 21 % June 2019), CKD IV not on dialysis (b/l SCr 2-2.2), hypothyroidism admitted for tachypnea 2/2 acute on chronic HFrEF exacerbation.   Pt required BIPAP which she refused /NC .  SHe has been having periods of hypoglycemia/not eating/ insulin adjusted .  CW with IV Diuretics/Bumex and monitor output .  Please follow up Palliative care rec and family decision about Code status .  Overall prognosis is poor.        Shanelle Benito  HOspitalist   [phone]

## 2021-04-18 NOTE — PROGRESS NOTE ADULT - PROBLEM SELECTOR PLAN 7
DVT: HSQ  Diet: CC DASH  Dispo: pending symptomatic improvement DVT: HSQ  Diet: CC DASH  Dispo: pending symptomatic improvement  Family requested palliative consult

## 2021-04-18 NOTE — PROGRESS NOTE ADULT - ASSESSMENT
75y.o F Albanian speaking h/o HTN, HLD, DM, CAD s/p CABG 2014 and prior PCI, severe mitral regurgitation (s/p mitral clip 2019), pulmonary HTN (TTE 2019), HF (EF 21 % June 2019), CKD IV not on dialysis (b/l SCr 2-2.2), hypothyroidism admitted for tachypnea 2/2 acute on chronic HFrEF exacerbation.

## 2021-04-19 DIAGNOSIS — Z29.9 ENCOUNTER FOR PROPHYLACTIC MEASURES, UNSPECIFIED: ICD-10-CM

## 2021-04-19 DIAGNOSIS — I50.9 HEART FAILURE, UNSPECIFIED: ICD-10-CM

## 2021-04-19 LAB
ALBUMIN SERPL ELPH-MCNC: 3.4 G/DL — SIGNIFICANT CHANGE UP (ref 3.3–5)
ALP SERPL-CCNC: 154 U/L — HIGH (ref 40–120)
ALT FLD-CCNC: 16 U/L — SIGNIFICANT CHANGE UP (ref 10–45)
ANION GAP SERPL CALC-SCNC: 14 MMOL/L — SIGNIFICANT CHANGE UP (ref 5–17)
ANION GAP SERPL CALC-SCNC: 14 MMOL/L — SIGNIFICANT CHANGE UP (ref 5–17)
AST SERPL-CCNC: 28 U/L — SIGNIFICANT CHANGE UP (ref 10–40)
BILIRUB SERPL-MCNC: 0.4 MG/DL — SIGNIFICANT CHANGE UP (ref 0.2–1.2)
BUN SERPL-MCNC: 52 MG/DL — HIGH (ref 7–23)
BUN SERPL-MCNC: 52 MG/DL — HIGH (ref 7–23)
CALCIUM SERPL-MCNC: 9.2 MG/DL — SIGNIFICANT CHANGE UP (ref 8.4–10.5)
CALCIUM SERPL-MCNC: 9.2 MG/DL — SIGNIFICANT CHANGE UP (ref 8.4–10.5)
CHLORIDE SERPL-SCNC: 105 MMOL/L — SIGNIFICANT CHANGE UP (ref 96–108)
CHLORIDE SERPL-SCNC: 108 MMOL/L — SIGNIFICANT CHANGE UP (ref 96–108)
CO2 SERPL-SCNC: 19 MMOL/L — LOW (ref 22–31)
CO2 SERPL-SCNC: 20 MMOL/L — LOW (ref 22–31)
CREAT SERPL-MCNC: 1.58 MG/DL — HIGH (ref 0.5–1.3)
CREAT SERPL-MCNC: 1.71 MG/DL — HIGH (ref 0.5–1.3)
GLUCOSE BLDC GLUCOMTR-MCNC: 119 MG/DL — HIGH (ref 70–99)
GLUCOSE BLDC GLUCOMTR-MCNC: 216 MG/DL — HIGH (ref 70–99)
GLUCOSE BLDC GLUCOMTR-MCNC: 247 MG/DL — HIGH (ref 70–99)
GLUCOSE BLDC GLUCOMTR-MCNC: 336 MG/DL — HIGH (ref 70–99)
GLUCOSE SERPL-MCNC: 239 MG/DL — HIGH (ref 70–99)
GLUCOSE SERPL-MCNC: 277 MG/DL — HIGH (ref 70–99)
HCT VFR BLD CALC: 30.4 % — LOW (ref 34.5–45)
HGB BLD-MCNC: 8.8 G/DL — LOW (ref 11.5–15.5)
MAGNESIUM SERPL-MCNC: 2.1 MG/DL — SIGNIFICANT CHANGE UP (ref 1.6–2.6)
MCHC RBC-ENTMCNC: 23 PG — LOW (ref 27–34)
MCHC RBC-ENTMCNC: 28.9 GM/DL — LOW (ref 32–36)
MCV RBC AUTO: 79.4 FL — LOW (ref 80–100)
NRBC # BLD: 0 /100 WBCS — SIGNIFICANT CHANGE UP (ref 0–0)
PHOSPHATE SERPL-MCNC: 3.7 MG/DL — SIGNIFICANT CHANGE UP (ref 2.5–4.5)
PLATELET # BLD AUTO: 374 K/UL — SIGNIFICANT CHANGE UP (ref 150–400)
POTASSIUM SERPL-MCNC: 4.4 MMOL/L — SIGNIFICANT CHANGE UP (ref 3.5–5.3)
POTASSIUM SERPL-MCNC: 4.7 MMOL/L — SIGNIFICANT CHANGE UP (ref 3.5–5.3)
POTASSIUM SERPL-SCNC: 4.4 MMOL/L — SIGNIFICANT CHANGE UP (ref 3.5–5.3)
POTASSIUM SERPL-SCNC: 4.7 MMOL/L — SIGNIFICANT CHANGE UP (ref 3.5–5.3)
PROT SERPL-MCNC: 7.6 G/DL — SIGNIFICANT CHANGE UP (ref 6–8.3)
RBC # BLD: 3.83 M/UL — SIGNIFICANT CHANGE UP (ref 3.8–5.2)
RBC # FLD: 19.9 % — HIGH (ref 10.3–14.5)
SODIUM SERPL-SCNC: 139 MMOL/L — SIGNIFICANT CHANGE UP (ref 135–145)
SODIUM SERPL-SCNC: 141 MMOL/L — SIGNIFICANT CHANGE UP (ref 135–145)
WBC # BLD: 9.71 K/UL — SIGNIFICANT CHANGE UP (ref 3.8–10.5)
WBC # FLD AUTO: 9.71 K/UL — SIGNIFICANT CHANGE UP (ref 3.8–10.5)

## 2021-04-19 PROCEDURE — 99233 SBSQ HOSP IP/OBS HIGH 50: CPT | Mod: GC

## 2021-04-19 PROCEDURE — 93306 TTE W/DOPPLER COMPLETE: CPT | Mod: 26

## 2021-04-19 RX ORDER — ALPRAZOLAM 0.25 MG
0.25 TABLET ORAL THREE TIMES A DAY
Refills: 0 | Status: DISCONTINUED | OUTPATIENT
Start: 2021-04-19 | End: 2021-04-20

## 2021-04-19 RX ADMIN — Medication 650 MILLIGRAM(S): at 11:51

## 2021-04-19 RX ADMIN — Medication 2: at 13:13

## 2021-04-19 RX ADMIN — Medication 37.5 MILLIGRAM(S): at 05:11

## 2021-04-19 RX ADMIN — Medication 100 MILLIGRAM(S): at 05:11

## 2021-04-19 RX ADMIN — ATORVASTATIN CALCIUM 80 MILLIGRAM(S): 80 TABLET, FILM COATED ORAL at 22:13

## 2021-04-19 RX ADMIN — Medication 50 MICROGRAM(S): at 05:11

## 2021-04-19 RX ADMIN — INSULIN GLARGINE 10 UNIT(S): 100 INJECTION, SOLUTION SUBCUTANEOUS at 22:12

## 2021-04-19 RX ADMIN — HEPARIN SODIUM 5000 UNIT(S): 5000 INJECTION INTRAVENOUS; SUBCUTANEOUS at 05:12

## 2021-04-19 RX ADMIN — Medication 3 MILLIGRAM(S): at 22:13

## 2021-04-19 RX ADMIN — HEPARIN SODIUM 5000 UNIT(S): 5000 INJECTION INTRAVENOUS; SUBCUTANEOUS at 22:14

## 2021-04-19 RX ADMIN — Medication 0.25 MILLIGRAM(S): at 23:17

## 2021-04-19 RX ADMIN — SENNA PLUS 2 TABLET(S): 8.6 TABLET ORAL at 22:14

## 2021-04-19 RX ADMIN — Medication 37.5 MILLIGRAM(S): at 22:14

## 2021-04-19 RX ADMIN — BUMETANIDE 2 MILLIGRAM(S): 0.25 INJECTION INTRAMUSCULAR; INTRAVENOUS at 05:12

## 2021-04-19 RX ADMIN — SPIRONOLACTONE 12.5 MILLIGRAM(S): 25 TABLET, FILM COATED ORAL at 05:12

## 2021-04-19 RX ADMIN — Medication 2: at 17:49

## 2021-04-19 RX ADMIN — Medication 0.25 MILLIGRAM(S): at 11:58

## 2021-04-19 RX ADMIN — CLOPIDOGREL BISULFATE 75 MILLIGRAM(S): 75 TABLET, FILM COATED ORAL at 11:50

## 2021-04-19 RX ADMIN — BUMETANIDE 2 MILLIGRAM(S): 0.25 INJECTION INTRAMUSCULAR; INTRAVENOUS at 18:10

## 2021-04-19 RX ADMIN — Medication 37.5 MILLIGRAM(S): at 13:32

## 2021-04-19 RX ADMIN — PANTOPRAZOLE SODIUM 40 MILLIGRAM(S): 20 TABLET, DELAYED RELEASE ORAL at 05:12

## 2021-04-19 RX ADMIN — Medication 81 MILLIGRAM(S): at 11:50

## 2021-04-19 RX ADMIN — Medication 2: at 22:13

## 2021-04-19 RX ADMIN — HEPARIN SODIUM 5000 UNIT(S): 5000 INJECTION INTRAVENOUS; SUBCUTANEOUS at 13:33

## 2021-04-19 NOTE — PROGRESS NOTE ADULT - SUBJECTIVE AND OBJECTIVE BOX
[facility] [place] [facility]   MD Dion Dasilva MD, D.O. Ruoru Wong, PA    From 7 AM - 5 PM:  OFFICE: [phone]  Dr. Muhammad cell: [phone]  Dr. Montero cell: [phone]  Dr. Soria cell: [phone]  RASHIDA Smith cell: [phone]    From 5 PM - 7 AM: Answering Service: [phone]  Date of service: 04-19-21 @ 09:23    RENAL FOLLOW UP NOTE  --------------------------------------------------------------------------------  HPI:  Pt seen and examined at bedside.       PAST HISTORY  --------------------------------------------------------------------------------  No significant changes to PMH, PSH, FHx, SHx, unless otherwise noted    ALLERGIES & MEDICATIONS  --------------------------------------------------------------------------------  Allergies    azithromycin (Hives; Pruritus)    Intolerances      Standing Inpatient Medications  aspirin enteric coated 81 milliGRAM(s) Oral daily  atorvastatin 80 milliGRAM(s) Oral at bedtime  buMETAnide Injectable 2 milliGRAM(s) IV Push two times a day  clopidogrel Tablet 75 milliGRAM(s) Oral daily  dextrose 40% Gel 15 Gram(s) Oral once  dextrose 5%. 1000 milliLiter(s) IV Continuous <Continuous>  dextrose 5%. 1000 milliLiter(s) IV Continuous <Continuous>  dextrose 5%. 1000 milliLiter(s) IV Continuous <Continuous>  dextrose 50% Injectable 25 Gram(s) IV Push once  dextrose 50% Injectable 12.5 Gram(s) IV Push once  dextrose 50% Injectable 25 Gram(s) IV Push once  glucagon  Injectable 1 milliGRAM(s) IntraMuscular once  heparin   Injectable 5000 Unit(s) SubCutaneous every 8 hours  hydrALAZINE 37.5 milliGRAM(s) Oral three times a day  insulin glargine Injectable (LANTUS) 10 Unit(s) SubCutaneous at bedtime  insulin lispro (ADMELOG) corrective regimen sliding scale   SubCutaneous three times a day before meals  insulin lispro (ADMELOG) corrective regimen sliding scale   SubCutaneous at bedtime  levothyroxine 50 MICROGram(s) Oral daily  melatonin 3 milliGRAM(s) Oral at bedtime  metoprolol succinate  milliGRAM(s) Oral daily  pantoprazole    Tablet 40 milliGRAM(s) Oral before breakfast  senna 2 Tablet(s) Oral at bedtime  sodium bicarbonate 650 milliGRAM(s) Oral daily  spironolactone 12.5 milliGRAM(s) Oral daily    PRN Inpatient Medications  polyethylene glycol 3350 17 Gram(s) Oral two times a day PRN      REVIEW OF SYSTEMS  --------------------------------------------------------------------------------  General: no fever  CVS: no chest pain  RESP: no sob, no cough  ABD: no abdominal pain  : no dysuria,  MSK: no edema     VITALS/PHYSICAL EXAM  --------------------------------------------------------------------------------  T(C): 36.4 (04-19-21 @ 04:39), Max: 36.4 (04-18-21 @ 17:34)  HR: 74 (04-19-21 @ 05:46) (68 - 89)  BP: 118/62 (04-19-21 @ 04:39) (98/67 - 119/70)  RR: 20 (04-19-21 @ 04:39) (20 - 22)  SpO2: 96% (04-19-21 @ 05:46) (92% - 98%)  Wt(kg): --        04-18-21 @ 07:01  -  04-19-21 @ 07:00  --------------------------------------------------------  IN: 1380 mL / OUT: 1400 mL / NET: -20 mL      Physical Exam:  	Gen: NAD  	HEENT: MMM  	Pulm: CTA B/L  	CV: S1S2  	Abd: Soft, +BS  	Ext: No LE edema B/L                      Neuro: Awake   	Skin: Warm and Dry   	    LABS/STUDIES  --------------------------------------------------------------------------------              8.8    9.71  >-----------<  374      [04-19-21 @ 01:01]              30.4     141  |  108  |  52  ----------------------------<  239      [04-19-21 @ 01:13]  4.7   |  19  |  1.58        Ca     9.2     [04-19-21 @ 01:13]      Mg     2.1     [04-19-21 @ 01:13]      Phos  3.7     [04-19-21 @ 01:13]    TPro  7.6  /  Alb  3.4  /  TBili  0.4  /  DBili  x   /  AST  28  /  ALT  16  /  AlkPhos  154  [04-19-21 @ 01:13]      Creatinine Trend:  SCr 1.58 [04-19 @ 01:13]  SCr 1.58 [04-18 @ 21:56]  SCr 1.50 [04-18 @ 07:15]  SCr 1.47 [04-17 @ 23:08]  SCr 1.54 [04-17 @ 07:21]        Ferritin 111      [01-13-21 @ 01:42]  HbA1c 7.5      [10-08-19 @ 14:30]  TSH 1.94      [04-17-21 @ 02:28]

## 2021-04-19 NOTE — PROGRESS NOTE ADULT - SUBJECTIVE AND OBJECTIVE BOX
Adrianna Deng PGY1    Patient is a 75y old  Female who presents with a chief complaint of dyspnea (18 Apr 2021 08:48)      SUBJECTIVE / OVERNIGHT EVENTS:  - overnight - no events  - am -     MEDICATIONS  (STANDING):  aspirin enteric coated 81 milliGRAM(s) Oral daily  atorvastatin 80 milliGRAM(s) Oral at bedtime  buMETAnide Injectable 2 milliGRAM(s) IV Push two times a day  clopidogrel Tablet 75 milliGRAM(s) Oral daily  dextrose 40% Gel 15 Gram(s) Oral once  dextrose 5%. 1000 milliLiter(s) (50 mL/Hr) IV Continuous <Continuous>  dextrose 5%. 1000 milliLiter(s) (100 mL/Hr) IV Continuous <Continuous>  dextrose 5%. 1000 milliLiter(s) (50 mL/Hr) IV Continuous <Continuous>  dextrose 50% Injectable 25 Gram(s) IV Push once  dextrose 50% Injectable 12.5 Gram(s) IV Push once  dextrose 50% Injectable 25 Gram(s) IV Push once  glucagon  Injectable 1 milliGRAM(s) IntraMuscular once  heparin   Injectable 5000 Unit(s) SubCutaneous every 8 hours  hydrALAZINE 37.5 milliGRAM(s) Oral three times a day  insulin glargine Injectable (LANTUS) 10 Unit(s) SubCutaneous at bedtime  insulin lispro (ADMELOG) corrective regimen sliding scale   SubCutaneous three times a day before meals  insulin lispro (ADMELOG) corrective regimen sliding scale   SubCutaneous at bedtime  levothyroxine 50 MICROGram(s) Oral daily  melatonin 3 milliGRAM(s) Oral at bedtime  metoprolol succinate  milliGRAM(s) Oral daily  pantoprazole    Tablet 40 milliGRAM(s) Oral before breakfast  senna 2 Tablet(s) Oral at bedtime  sodium bicarbonate 650 milliGRAM(s) Oral daily  spironolactone 12.5 milliGRAM(s) Oral daily    MEDICATIONS  (PRN):  polyethylene glycol 3350 17 Gram(s) Oral two times a day PRN Constipation      CAPILLARY BLOOD GLUCOSE      POCT Blood Glucose.: 230 mg/dL (18 Apr 2021 22:20)  POCT Blood Glucose.: 215 mg/dL (18 Apr 2021 21:59)  POCT Blood Glucose.: 273 mg/dL (18 Apr 2021 18:49)  POCT Blood Glucose.: 268 mg/dL (18 Apr 2021 17:37)  POCT Blood Glucose.: 111 mg/dL (18 Apr 2021 12:33)  POCT Blood Glucose.: 81 mg/dL (18 Apr 2021 10:13)  POCT Blood Glucose.: 72 mg/dL (18 Apr 2021 09:21)  POCT Blood Glucose.: 70 mg/dL (18 Apr 2021 08:42)  POCT Blood Glucose.: 58 mg/dL (18 Apr 2021 08:20)    I&O's Summary    18 Apr 2021 07:01  -  19 Apr 2021 07:00  --------------------------------------------------------  IN: 1380 mL / OUT: 1400 mL / NET: -20 mL        Vital Signs Last 24 Hrs  T(C): 36.4 (19 Apr 2021 04:39), Max: 36.4 (18 Apr 2021 17:34)  T(F): 97.5 (19 Apr 2021 04:39), Max: 97.6 (18 Apr 2021 17:34)  HR: 74 (19 Apr 2021 05:46) (68 - 89)  BP: 118/62 (19 Apr 2021 04:39) (98/67 - 119/70)  BP(mean): --  RR: 20 (19 Apr 2021 04:39) (20 - 22)  SpO2: 96% (19 Apr 2021 05:46) (92% - 98%)    PHYSICAL EXAM:  CONSTITUTIONAL:   RESPIRATORY:   CARDIOVASCULAR: Regular rate and rhythm, normal S1 and S2, no murmur/rub/gallop; 1+ lower extremity edema;   ABDOMEN: Nontender to palpation, normoactive bowel sounds  MUSCULOSKELETAL: no clubbing or cyanosis of digits; no joint swelling or tenderness to palpation  PSYCH: A+O to person and place  NEURO: Non-focal, no tremors  SKIN: No rashes      LABS:                        8.8    9.71  )-----------( 374      ( 19 Apr 2021 01:01 )             30.4      04-19    141  |  108  |  52<H>  ----------------------------<  239<H>  4.7   |  19<L>  |  1.58<H>    Ca    9.2      19 Apr 2021 01:13  Phos  3.7     04-19  Mg     2.1     04-19    TPro  7.6  /  Alb  3.4  /  TBili  0.4  /  DBili  x   /  AST  28  /  ALT  16  /  AlkPhos  154<H>  04-19              RADIOLOGY & ADDITIONAL TESTS:    Imaging Personally Reviewed:    Consultant(s) Notes Reviewed:      Care Discussed with Consultants/Other Providers:   Adrianna Eidathy PGY1    Patient is a 75y old  Female who presents with a chief complaint of dyspnea (18 Apr 2021 08:48)      SUBJECTIVE / OVERNIGHT EVENTS:  - overnight - no events  - am - pt is sitting up, comfortable on 4L NC.     MEDICATIONS  (STANDING):  aspirin enteric coated 81 milliGRAM(s) Oral daily  atorvastatin 80 milliGRAM(s) Oral at bedtime  buMETAnide Injectable 2 milliGRAM(s) IV Push two times a day  clopidogrel Tablet 75 milliGRAM(s) Oral daily  dextrose 40% Gel 15 Gram(s) Oral once  dextrose 5%. 1000 milliLiter(s) (50 mL/Hr) IV Continuous <Continuous>  dextrose 5%. 1000 milliLiter(s) (100 mL/Hr) IV Continuous <Continuous>  dextrose 5%. 1000 milliLiter(s) (50 mL/Hr) IV Continuous <Continuous>  dextrose 50% Injectable 25 Gram(s) IV Push once  dextrose 50% Injectable 12.5 Gram(s) IV Push once  dextrose 50% Injectable 25 Gram(s) IV Push once  glucagon  Injectable 1 milliGRAM(s) IntraMuscular once  heparin   Injectable 5000 Unit(s) SubCutaneous every 8 hours  hydrALAZINE 37.5 milliGRAM(s) Oral three times a day  insulin glargine Injectable (LANTUS) 10 Unit(s) SubCutaneous at bedtime  insulin lispro (ADMELOG) corrective regimen sliding scale   SubCutaneous three times a day before meals  insulin lispro (ADMELOG) corrective regimen sliding scale   SubCutaneous at bedtime  levothyroxine 50 MICROGram(s) Oral daily  melatonin 3 milliGRAM(s) Oral at bedtime  metoprolol succinate  milliGRAM(s) Oral daily  pantoprazole    Tablet 40 milliGRAM(s) Oral before breakfast  senna 2 Tablet(s) Oral at bedtime  sodium bicarbonate 650 milliGRAM(s) Oral daily  spironolactone 12.5 milliGRAM(s) Oral daily    MEDICATIONS  (PRN):  polyethylene glycol 3350 17 Gram(s) Oral two times a day PRN Constipation      CAPILLARY BLOOD GLUCOSE      POCT Blood Glucose.: 230 mg/dL (18 Apr 2021 22:20)  POCT Blood Glucose.: 215 mg/dL (18 Apr 2021 21:59)  POCT Blood Glucose.: 273 mg/dL (18 Apr 2021 18:49)  POCT Blood Glucose.: 268 mg/dL (18 Apr 2021 17:37)  POCT Blood Glucose.: 111 mg/dL (18 Apr 2021 12:33)  POCT Blood Glucose.: 81 mg/dL (18 Apr 2021 10:13)  POCT Blood Glucose.: 72 mg/dL (18 Apr 2021 09:21)  POCT Blood Glucose.: 70 mg/dL (18 Apr 2021 08:42)  POCT Blood Glucose.: 58 mg/dL (18 Apr 2021 08:20)    I&O's Summary    18 Apr 2021 07:01  -  19 Apr 2021 07:00  --------------------------------------------------------  IN: 1380 mL / OUT: 1400 mL / NET: -20 mL        Vital Signs Last 24 Hrs  T(C): 36.4 (19 Apr 2021 04:39), Max: 36.4 (18 Apr 2021 17:34)  T(F): 97.5 (19 Apr 2021 04:39), Max: 97.6 (18 Apr 2021 17:34)  HR: 74 (19 Apr 2021 05:46) (68 - 89)  BP: 118/62 (19 Apr 2021 04:39) (98/67 - 119/70)  BP(mean): --  RR: 20 (19 Apr 2021 04:39) (20 - 22)  SpO2: 96% (19 Apr 2021 05:46) (92% - 98%)    PHYSICAL EXAM:  CONSTITUTIONAL:   RESPIRATORY:   CARDIOVASCULAR: Regular rate and rhythm, normal S1 and S2, no murmur/rub/gallop; 1+ lower extremity edema;   ABDOMEN: Nontender to palpation, normoactive bowel sounds  MUSCULOSKELETAL: no clubbing or cyanosis of digits; no joint swelling or tenderness to palpation  PSYCH: A+O to person and place  NEURO: Non-focal, no tremors  SKIN: No rashes      LABS:                        8.8    9.71  )-----------( 374      ( 19 Apr 2021 01:01 )             30.4      04-19    141  |  108  |  52<H>  ----------------------------<  239<H>  4.7   |  19<L>  |  1.58<H>    Ca    9.2      19 Apr 2021 01:13  Phos  3.7     04-19  Mg     2.1     04-19    TPro  7.6  /  Alb  3.4  /  TBili  0.4  /  DBili  x   /  AST  28  /  ALT  16  /  AlkPhos  154<H>  04-19              RADIOLOGY & ADDITIONAL TESTS:    Imaging Personally Reviewed:    Consultant(s) Notes Reviewed:      Care Discussed with Consultants/Other Providers:   Adrianna Deng PGY1    Patient is a 75y old  Female who presents with a chief complaint of dyspnea (18 Apr 2021 08:48)      SUBJECTIVE / OVERNIGHT EVENTS:  - overnight - no events  - am - pt is sitting up, comfortable on 4L NC. when noticed me, became dyspneic. endorsed she was feeling anxious. asked her to take in deep breaths and try to relax. denies chest pain, endorses difficulty breathing.     MEDICATIONS  (STANDING):  aspirin enteric coated 81 milliGRAM(s) Oral daily  atorvastatin 80 milliGRAM(s) Oral at bedtime  buMETAnide Injectable 2 milliGRAM(s) IV Push two times a day  clopidogrel Tablet 75 milliGRAM(s) Oral daily  dextrose 40% Gel 15 Gram(s) Oral once  dextrose 5%. 1000 milliLiter(s) (50 mL/Hr) IV Continuous <Continuous>  dextrose 5%. 1000 milliLiter(s) (100 mL/Hr) IV Continuous <Continuous>  dextrose 5%. 1000 milliLiter(s) (50 mL/Hr) IV Continuous <Continuous>  dextrose 50% Injectable 25 Gram(s) IV Push once  dextrose 50% Injectable 12.5 Gram(s) IV Push once  dextrose 50% Injectable 25 Gram(s) IV Push once  glucagon  Injectable 1 milliGRAM(s) IntraMuscular once  heparin   Injectable 5000 Unit(s) SubCutaneous every 8 hours  hydrALAZINE 37.5 milliGRAM(s) Oral three times a day  insulin glargine Injectable (LANTUS) 10 Unit(s) SubCutaneous at bedtime  insulin lispro (ADMELOG) corrective regimen sliding scale   SubCutaneous three times a day before meals  insulin lispro (ADMELOG) corrective regimen sliding scale   SubCutaneous at bedtime  levothyroxine 50 MICROGram(s) Oral daily  melatonin 3 milliGRAM(s) Oral at bedtime  metoprolol succinate  milliGRAM(s) Oral daily  pantoprazole    Tablet 40 milliGRAM(s) Oral before breakfast  senna 2 Tablet(s) Oral at bedtime  sodium bicarbonate 650 milliGRAM(s) Oral daily  spironolactone 12.5 milliGRAM(s) Oral daily    MEDICATIONS  (PRN):  polyethylene glycol 3350 17 Gram(s) Oral two times a day PRN Constipation      CAPILLARY BLOOD GLUCOSE      POCT Blood Glucose.: 230 mg/dL (18 Apr 2021 22:20)  POCT Blood Glucose.: 215 mg/dL (18 Apr 2021 21:59)  POCT Blood Glucose.: 273 mg/dL (18 Apr 2021 18:49)  POCT Blood Glucose.: 268 mg/dL (18 Apr 2021 17:37)  POCT Blood Glucose.: 111 mg/dL (18 Apr 2021 12:33)  POCT Blood Glucose.: 81 mg/dL (18 Apr 2021 10:13)  POCT Blood Glucose.: 72 mg/dL (18 Apr 2021 09:21)  POCT Blood Glucose.: 70 mg/dL (18 Apr 2021 08:42)  POCT Blood Glucose.: 58 mg/dL (18 Apr 2021 08:20)    I&O's Summary    18 Apr 2021 07:01  -  19 Apr 2021 07:00  --------------------------------------------------------  IN: 1380 mL / OUT: 1400 mL / NET: -20 mL        Vital Signs Last 24 Hrs  T(C): 36.4 (19 Apr 2021 04:39), Max: 36.4 (18 Apr 2021 17:34)  T(F): 97.5 (19 Apr 2021 04:39), Max: 97.6 (18 Apr 2021 17:34)  HR: 74 (19 Apr 2021 05:46) (68 - 89)  BP: 118/62 (19 Apr 2021 04:39) (98/67 - 119/70)  BP(mean): --  RR: 20 (19 Apr 2021 04:39) (20 - 22)  SpO2: 96% (19 Apr 2021 05:46) (92% - 98%)    PHYSICAL EXAM:  CONSTITUTIONAL: on 4L NC, dyspneic intermittently.   RESPIRATORY: crackles bilateral basilar, no wheeze on 4L NC.  CARDIOVASCULAR: regular rate and rhythm, sinus on telemetry  ABDOMEN: Nontender to palpation, normoactive bowel sounds  MUSCULOSKELETAL: able to move all extremities  NEURO: Non-focal, no tremors      LABS:                        8.8    9.71  )-----------( 374      ( 19 Apr 2021 01:01 )             30.4      04-19    141  |  108  |  52<H>  ----------------------------<  239<H>  4.7   |  19<L>  |  1.58<H>    Ca    9.2      19 Apr 2021 01:13  Phos  3.7     04-19  Mg     2.1     04-19    TPro  7.6  /  Alb  3.4  /  TBili  0.4  /  DBili  x   /  AST  28  /  ALT  16  /  AlkPhos  154<H>  04-19              RADIOLOGY & ADDITIONAL TESTS:    Imaging Personally Reviewed:    Consultant(s) Notes Reviewed:      Care Discussed with Consultants/Other Providers:

## 2021-04-19 NOTE — CHART NOTE - NSCHARTNOTEFT_GEN_A_CORE
RD CHF education chart note    Patient was visited by RD for CHF education. Heart failure education provided to the patient in detail. Discussed heart failure nutrition therapy, sodium and fluid intake, importance of diet adherence, daily weights monitoring with the patient. Reinforced importance of weight gain parameters and importance of contacting MD’s about weight changes. Provided handouts on heart failure nutrition therapy, reading heart healthy nutrition labels, heart healthy shopping tips and low sodium food list. Patient unable to verbalize understanding by teach back method-pt tires at time of visit. RD contact information left with patient for any future questioning.

## 2021-04-19 NOTE — PHYSICAL THERAPY INITIAL EVALUATION ADULT - ADDITIONAL COMMENTS
Pt poor historian, per chart: Pt lives in a pvt home w/ daughter & son-in-law, 3 steps to enter. PTA pt was non-ambulatory but has RW & cane. Able to transfer bed to wheelchair w/ assist.

## 2021-04-19 NOTE — PHYSICAL THERAPY INITIAL EVALUATION ADULT - PERTINENT HX OF CURRENT PROBLEM, REHAB EVAL
75 y.o. F PMH HTN, HLD, DM, CAD s/p CABG 2014 & prior PCI, severe MR (s/p mitral clip 2019), pulmonary HTN, HF (EF 21 % June 2019), CKD IV not on dialysis (b/l SCr 2-2.2), hypothyroidism, p/w 7 days of SOB. Recent admit for hypoxic respiratory failure 2/2 acute on chronic HFrEF exacerbation in the setting of mitral stenosis c/b MERVIN on CKD. She was at home with family and since has been having worsening sob and so they decided to take her to the emergency room.

## 2021-04-19 NOTE — PHYSICAL THERAPY INITIAL EVALUATION ADULT - PRECAUTIONS/LIMITATIONS, REHAB EVAL
Patient brought in by EMS  from a friends house. Was having dinner when she felt diaphoretic. LOC for approximately 5 minutes. Patient brought down to ground by family. No injuries obtained and did not hit head. Hypotensive on scene (83/51). FS- 241 18G IV lock to left forearm- received 1200cc NS. PMH- hld, hypothyroid, htn, colitis, CVA (no residual), osteoporosis
no known precautions/limitations

## 2021-04-19 NOTE — PROGRESS NOTE ADULT - PROBLEM SELECTOR PLAN 4
home regimen is Lantus to 45u at bedtime/25u AM  - Lantus and premeal d/c'ed 2/2 hypoglycemia while NPO;   - Will resume basal coverage when tolerating PO  - Patient counseled for compliance with consistent low carb diet and exercise as tolerated outpatient.

## 2021-04-19 NOTE — PROGRESS NOTE ADULT - PROBLEM SELECTOR PLAN 6
DVT: HSQ  Diet: CC DASH  Dispo: pending symptomatic improvement  Family requested palliative consult.

## 2021-04-19 NOTE — PROGRESS NOTE ADULT - ATTENDING COMMENTS
74yo F Serbian speaking h/o HTN, HLD, DM, CAD s/p CABG 2014 and prior PCI, severe mitral regurgitation (s/p mitral clip 2019), pulmonary HTN (TTE 2019), HF (EF 21 % June 2019), CKD IV not on dialysis (b/l SCr 2-2.2), hypothyroidism admitted for acute hypoxic respiratory failure and acute on chronic HFrEF exacerbation. c/w current dose of IV bumex.  Monitor I/O and daily weights. Trial low dose Xanax for anxiety. Palliative to be consulted given family request. Renal function stable

## 2021-04-19 NOTE — PROGRESS NOTE ADULT - ASSESSMENT
75y.o F Nepali speaking h/o HTN, HLD, DM, CAD s/p CABG 2014 and prior PCI, severe mitral regurgitation (s/p mitral clip 2019), pulmonary HTN (TTE 2019), HF (EF 21 % June 2019), CKD IV not on dialysis (b/l SCr 2-2.2), hypothyroidism comes to the Ed with 7 days of SOB. Per chart review, patient recently admitted for hypoxic respiratory failure 2/2 acute on chronic HFrEF exacerbation in the setting of mitral stenosis c/b MERVIN on CKD.  Pt initially treated w/ IV diuresis but was started on milrinone drip after RHC showed persistent elevated filled pressures. She received IV diuresis, milrinone continued dyspnea despite clinical euvolemic status.  PRN xanax started for possible anxiety component.  Labs w/ intermittent elevations in WBC in the setting of asymptomatic bacteruria    CKD stage 4  - baseline Cr 1.9-2.2; has had recurrent MERVIN's over the years  - CKD is likely 2/2 recurrent MERVIN's + underlying DM/HTN  - Renal function stable   - Continue Diuretics per cardiology  - Avoid hypotension   - Avoid nephrotoxins including NSAIDs, IV contrast (if possible), [employer] products  - monitor I/O's accurately    Acidosis  serum bicarb below goal for CKD   Continue sodium bicarb daily  MOnitor serum CO2    HTN  BP controlled  Low salt diet   MOnitor BP    SOB  likely sec to CHF  Follow up ECHO  Follow up Cardiology  Monitor daily weights     Hypokalemia  sec to diuretics  improved   MOnitor serm K

## 2021-04-19 NOTE — PROGRESS NOTE ADULT - ASSESSMENT
75y.o F Vietnamese speaking h/o HTN, HLD, DM, CAD s/p CABG 2014 and prior PCI, severe mitral regurgitation (s/p mitral clip 2019), pulmonary HTN (TTE 2019), HF (EF 21 % June 2019), CKD IV not on dialysis (b/l SCr 2-2.2), hypothyroidism admitted for tachypnea 2/2 acute on chronic HFrEF exacerbation.  75y.o F Croatian speaking h/o DM, CAD s/p CABG 2014 and prior PCI, severe mitral regurgitation (s/p mitral clip 2019), pulmonary HTN (TTE 2019), HF (EF 21 % June 2019), CKD IV not on dialysis (b/l SCr 2-2.2), admitted for CHF exacerbation, on IV bumex  admitted for tachypnea 2/2 acute on chronic HFrEF exacerbation.

## 2021-04-19 NOTE — PROGRESS NOTE ADULT - PROBLEM SELECTOR PLAN 1
- admitted for HF exacerbation (tachypneic to 30s, on non-rebreather in ED, needing bipap on floors for resp distress, now on NC). CXR on admission with pulm edema, BNP 6000. Less likely ACS (trop 49-> 42)  - last HF admission 1/2021, requiring milrinone assisted diuresis.   - continue on 2mg IV bumex BID with net neg 1470 over last 24h.   - continue home dose: hydral 37.5 TID, metoprolol succinate 100mg QD. started on spironolactone 12.5mg QD this admission.   - TTE 4/19: f/u. Last TTE 1/13/2021 - EF 20%, severely reduced LV systolic function, decreased RV systolic function.   - Cr ~1.5 (baseline 2).  - daily weights, strict in/out  - HF following, f/u recs - admitted for HF exacerbation (tachypneic to 30s, on non-rebreather in ED, needing bipap on floors for resp distress, now on NC). CXR on admission with pulm edema, BNP 6000. Less likely ACS (trop 49-> 42)  - last HF admission 1/2021, requiring milrinone assisted diuresis.   - continue on 2mg IV bumex BID with net neg 1470 over last 24h. continue  - continue home dose: hydral 37.5 TID, metoprolol succinate 100mg QD. started on spironolactone 12.5mg QD this admission.   - TTE 4/19: f/u. Last TTE 1/13/2021 - EF 20%, severely reduced LV systolic function, decreased RV systolic function.   - Cr ~1.5 (baseline 2).  - daily weights, strict in/out  - HF following, f/u recs  - palliative consulted per family request for multiple HF exacerbation admissions - admitted for acute on chronic systolic HF exacerbation (tachypneic to 30s, on non-rebreather in ED, needing bipap on floors for resp distress, now on NC). CXR on admission with pulm edema, BNP 6000. Less likely ACS (trop 49-> 42)  - last HF admission 1/2021, requiring milrinone assisted diuresis.   - continue on 2mg IV bumex BID with net neg 1470 over last 24h. continue  - continue home dose: hydral 37.5 TID, metoprolol succinate 100mg QD. started on spironolactone 12.5mg QD this admission.   - TTE 4/19: f/u. Last TTE 1/13/2021 - EF 20%, severely reduced LV systolic function, decreased RV systolic function.   - Cr ~1.5 (baseline 2).  - daily weights, strict in/out  - HF following, f/u recs  - palliative consulted per family request for multiple HF exacerbation admissions

## 2021-04-19 NOTE — PROGRESS NOTE ADULT - SUBJECTIVE AND OBJECTIVE BOX
S: Feels less SOB. Denies chest pain. Review of systems otherwise (-)    Review of Systems:   Constitutional: [ ] fevers, [ ] chills.   Skin: [ ] dry skin. [ ] rashes.  Psychiatric: [ ] depression, [ ] anxiety.   Gastrointestinal: [ ] BRBPR, [ ] melena.   Neurological: [ ] confusion. [ ] seizures. [ ] shuffling gait.   Ears,Nose,Mouth and Throat: [ ] ear pain [ ] sore throat.   Eyes: [ ] diplopia.   Respiratory: [ ] hemoptysis. [ ] shortness of breath  Cardiovascular: See HPI above  Hematologic/Lymphatic: [ ] anemia. [ ] painful nodes. [ ] prolonged bleeding.   Genitourinary: [ ] hematuria. [ ] flank pain.   Endocrine: [ ] significant change in weight. [ ] intolerance to heat and cold.     Review of systems [x ] otherwise negative, [ ] otherwise unable to obtain    FH: no family history of sudden cardiac death in first degree relatives    SH: [ ] tobacco, [ ] alcohol, [ ] drugs    ALPRAZolam 0.25 milliGRAM(s) Oral three times a day PRN  aspirin enteric coated 81 milliGRAM(s) Oral daily  atorvastatin 80 milliGRAM(s) Oral at bedtime  buMETAnide Injectable 2 milliGRAM(s) IV Push two times a day  clopidogrel Tablet 75 milliGRAM(s) Oral daily  dextrose 40% Gel 15 Gram(s) Oral once  dextrose 5%. 1000 milliLiter(s) IV Continuous <Continuous>  dextrose 5%. 1000 milliLiter(s) IV Continuous <Continuous>  dextrose 5%. 1000 milliLiter(s) IV Continuous <Continuous>  dextrose 50% Injectable 25 Gram(s) IV Push once  dextrose 50% Injectable 12.5 Gram(s) IV Push once  dextrose 50% Injectable 25 Gram(s) IV Push once  glucagon  Injectable 1 milliGRAM(s) IntraMuscular once  heparin   Injectable 5000 Unit(s) SubCutaneous every 8 hours  hydrALAZINE 37.5 milliGRAM(s) Oral three times a day  insulin glargine Injectable (LANTUS) 10 Unit(s) SubCutaneous at bedtime  insulin lispro (ADMELOG) corrective regimen sliding scale   SubCutaneous three times a day before meals  insulin lispro (ADMELOG) corrective regimen sliding scale   SubCutaneous at bedtime  levothyroxine 50 MICROGram(s) Oral daily  melatonin 3 milliGRAM(s) Oral at bedtime  metoprolol succinate  milliGRAM(s) Oral daily  pantoprazole    Tablet 40 milliGRAM(s) Oral before breakfast  polyethylene glycol 3350 17 Gram(s) Oral two times a day PRN  senna 2 Tablet(s) Oral at bedtime  sodium bicarbonate 650 milliGRAM(s) Oral daily  spironolactone 12.5 milliGRAM(s) Oral daily                            8.8    9.71  )-----------( 374      ( 19 Apr 2021 01:01 )             30.4       04-19    141  |  108  |  52<H>  ----------------------------<  239<H>  4.7   |  19<L>  |  1.58<H>    Ca    9.2      19 Apr 2021 01:13  Phos  3.7     04-19  Mg     2.1     04-19    TPro  7.6  /  Alb  3.4  /  TBili  0.4  /  DBili  x   /  AST  28  /  ALT  16  /  AlkPhos  154<H>  04-19            T(C): 36.6 (04-19-21 @ 12:37), Max: 36.6 (04-19-21 @ 12:37)  HR: 82 (04-19-21 @ 14:42) (68 - 87)  BP: 130/68 (04-19-21 @ 12:37) (98/62 - 130/68)  RR: 16 (04-19-21 @ 13:25) (16 - 20)  SpO2: 93% (04-19-21 @ 14:42) (93% - 98%)  Wt(kg): --    I&O's Summary    18 Apr 2021 07:01  -  19 Apr 2021 07:00  --------------------------------------------------------  IN: 1380 mL / OUT: 1400 mL / NET: -20 mL    19 Apr 2021 07:01  -  19 Apr 2021 16:20  --------------------------------------------------------  IN: 240 mL / OUT: 200 mL / NET: 40 mL      General: Well nourished in no acute distress. Alert and Oriented * 3.   Head: Normocephalic and atraumatic.   Neck: No JVD. No bruits. Supple. Does not appear to be enlarged.   Cardiovascular: + S1,S2 ; RRR Soft systolic murmur at the left lower sternal border. No rubs noted.    Lungs: CTA b/l. No rhonchi, rales or wheezes.   Abdomen: + BS, soft. Non tender. Non distended. No rebound. No guarding.   Extremities: No clubbing/cyanosis/edema.   Neurologic: Moves all four extremities. Full range of motion.   Skin: Warm and moist. The patient's skin has normal elasticity and good skin turgor.   Psychiatric: Appropriate mood and affect.  Musculoskeletal: Normal range of motion, normal strength    TELEMETRY: SR 70-80s    ASSESSMENT/PLAN: 	75y.o F Mongolian speaking h/o HTN, HLD, DM, CAD s/p CABG 2014 and prior PCI, severe mitral regurgitation (s/p mitral clip 2019), pulmonary HTN (TTE 2019), HF (EF 21 % June 2019), CKD IV not on dialysis (b/l SCr 2-2.2), hypothyroidism admitted for tachypnea 2/2 acute on chronic HFrEF exacerbation.     - Keep net negative with IV bumex/aldactone  - Continue hydralazine/Toprol XL  - dapt/statin for prior stents   - Monitor creatine/lytes  - Wean O2 as tolerated  - Anxiety treatment prn per med  - Repeat TTE pending  - Palliative eval pending    Amauri Hill PA-C  Pager: [phone]

## 2021-04-20 LAB
ANION GAP SERPL CALC-SCNC: 13 MMOL/L — SIGNIFICANT CHANGE UP (ref 5–17)
BUN SERPL-MCNC: 54 MG/DL — HIGH (ref 7–23)
CALCIUM SERPL-MCNC: 8.7 MG/DL — SIGNIFICANT CHANGE UP (ref 8.4–10.5)
CHLORIDE SERPL-SCNC: 107 MMOL/L — SIGNIFICANT CHANGE UP (ref 96–108)
CO2 SERPL-SCNC: 21 MMOL/L — LOW (ref 22–31)
CREAT SERPL-MCNC: 1.69 MG/DL — HIGH (ref 0.5–1.3)
GLUCOSE BLDC GLUCOMTR-MCNC: 187 MG/DL — HIGH (ref 70–99)
GLUCOSE BLDC GLUCOMTR-MCNC: 318 MG/DL — HIGH (ref 70–99)
GLUCOSE BLDC GLUCOMTR-MCNC: 353 MG/DL — HIGH (ref 70–99)
GLUCOSE BLDC GLUCOMTR-MCNC: 391 MG/DL — HIGH (ref 70–99)
GLUCOSE BLDC GLUCOMTR-MCNC: 397 MG/DL — HIGH (ref 70–99)
GLUCOSE BLDC GLUCOMTR-MCNC: 401 MG/DL — HIGH (ref 70–99)
GLUCOSE SERPL-MCNC: 177 MG/DL — HIGH (ref 70–99)
HCT VFR BLD CALC: 29.3 % — LOW (ref 34.5–45)
HGB BLD-MCNC: 8.4 G/DL — LOW (ref 11.5–15.5)
MAGNESIUM SERPL-MCNC: 2.1 MG/DL — SIGNIFICANT CHANGE UP (ref 1.6–2.6)
MAGNESIUM SERPL-MCNC: 2.1 MG/DL — SIGNIFICANT CHANGE UP (ref 1.6–2.6)
MCHC RBC-ENTMCNC: 22.8 PG — LOW (ref 27–34)
MCHC RBC-ENTMCNC: 28.7 GM/DL — LOW (ref 32–36)
MCV RBC AUTO: 79.4 FL — LOW (ref 80–100)
NRBC # BLD: 0 /100 WBCS — SIGNIFICANT CHANGE UP (ref 0–0)
PHOSPHATE SERPL-MCNC: 3.2 MG/DL — SIGNIFICANT CHANGE UP (ref 2.5–4.5)
PHOSPHATE SERPL-MCNC: 3.3 MG/DL — SIGNIFICANT CHANGE UP (ref 2.5–4.5)
PLATELET # BLD AUTO: 357 K/UL — SIGNIFICANT CHANGE UP (ref 150–400)
POTASSIUM SERPL-MCNC: 4.2 MMOL/L — SIGNIFICANT CHANGE UP (ref 3.5–5.3)
POTASSIUM SERPL-SCNC: 4.2 MMOL/L — SIGNIFICANT CHANGE UP (ref 3.5–5.3)
RBC # BLD: 3.69 M/UL — LOW (ref 3.8–5.2)
RBC # FLD: 19.9 % — HIGH (ref 10.3–14.5)
SODIUM SERPL-SCNC: 141 MMOL/L — SIGNIFICANT CHANGE UP (ref 135–145)
WBC # BLD: 10.27 K/UL — SIGNIFICANT CHANGE UP (ref 3.8–10.5)
WBC # FLD AUTO: 10.27 K/UL — SIGNIFICANT CHANGE UP (ref 3.8–10.5)

## 2021-04-20 PROCEDURE — 99233 SBSQ HOSP IP/OBS HIGH 50: CPT | Mod: GC

## 2021-04-20 PROCEDURE — 99498 ADVNCD CARE PLAN ADDL 30 MIN: CPT | Mod: 25

## 2021-04-20 PROCEDURE — 99223 1ST HOSP IP/OBS HIGH 75: CPT

## 2021-04-20 PROCEDURE — 99497 ADVNCD CARE PLAN 30 MIN: CPT | Mod: 25

## 2021-04-20 RX ORDER — INSULIN LISPRO 100/ML
7 VIAL (ML) SUBCUTANEOUS
Refills: 0 | Status: DISCONTINUED | OUTPATIENT
Start: 2021-04-20 | End: 2021-04-21

## 2021-04-20 RX ORDER — INSULIN GLARGINE 100 [IU]/ML
16 INJECTION, SOLUTION SUBCUTANEOUS AT BEDTIME
Refills: 0 | Status: DISCONTINUED | OUTPATIENT
Start: 2021-04-20 | End: 2021-04-21

## 2021-04-20 RX ORDER — INSULIN LISPRO 100/ML
5 VIAL (ML) SUBCUTANEOUS
Refills: 0 | Status: DISCONTINUED | OUTPATIENT
Start: 2021-04-20 | End: 2021-04-20

## 2021-04-20 RX ORDER — ESCITALOPRAM OXALATE 10 MG/1
5 TABLET, FILM COATED ORAL DAILY
Refills: 0 | Status: DISCONTINUED | OUTPATIENT
Start: 2021-04-20 | End: 2021-05-04

## 2021-04-20 RX ORDER — METOPROLOL TARTRATE 50 MG
100 TABLET ORAL DAILY
Refills: 0 | Status: DISCONTINUED | OUTPATIENT
Start: 2021-04-21 | End: 2021-05-04

## 2021-04-20 RX ADMIN — Medication 37.5 MILLIGRAM(S): at 06:29

## 2021-04-20 RX ADMIN — HEPARIN SODIUM 5000 UNIT(S): 5000 INJECTION INTRAVENOUS; SUBCUTANEOUS at 13:09

## 2021-04-20 RX ADMIN — BUMETANIDE 2 MILLIGRAM(S): 0.25 INJECTION INTRAMUSCULAR; INTRAVENOUS at 18:02

## 2021-04-20 RX ADMIN — Medication 1: at 09:38

## 2021-04-20 RX ADMIN — CLOPIDOGREL BISULFATE 75 MILLIGRAM(S): 75 TABLET, FILM COATED ORAL at 13:07

## 2021-04-20 RX ADMIN — Medication 37.5 MILLIGRAM(S): at 22:10

## 2021-04-20 RX ADMIN — Medication 5 UNIT(S): at 18:05

## 2021-04-20 RX ADMIN — HEPARIN SODIUM 5000 UNIT(S): 5000 INJECTION INTRAVENOUS; SUBCUTANEOUS at 22:10

## 2021-04-20 RX ADMIN — ESCITALOPRAM OXALATE 5 MILLIGRAM(S): 10 TABLET, FILM COATED ORAL at 19:58

## 2021-04-20 RX ADMIN — POLYETHYLENE GLYCOL 3350 17 GRAM(S): 17 POWDER, FOR SOLUTION ORAL at 17:41

## 2021-04-20 RX ADMIN — Medication 37.5 MILLIGRAM(S): at 13:10

## 2021-04-20 RX ADMIN — SPIRONOLACTONE 12.5 MILLIGRAM(S): 25 TABLET, FILM COATED ORAL at 06:28

## 2021-04-20 RX ADMIN — Medication 81 MILLIGRAM(S): at 13:07

## 2021-04-20 RX ADMIN — Medication 650 MILLIGRAM(S): at 13:09

## 2021-04-20 RX ADMIN — Medication 50 MICROGRAM(S): at 06:28

## 2021-04-20 RX ADMIN — Medication 5: at 13:55

## 2021-04-20 RX ADMIN — INSULIN GLARGINE 16 UNIT(S): 100 INJECTION, SOLUTION SUBCUTANEOUS at 22:08

## 2021-04-20 RX ADMIN — Medication 100 MILLIGRAM(S): at 06:28

## 2021-04-20 RX ADMIN — Medication 5: at 18:05

## 2021-04-20 RX ADMIN — HEPARIN SODIUM 5000 UNIT(S): 5000 INJECTION INTRAVENOUS; SUBCUTANEOUS at 06:29

## 2021-04-20 RX ADMIN — Medication 0.25 MILLIGRAM(S): at 17:48

## 2021-04-20 RX ADMIN — BUMETANIDE 2 MILLIGRAM(S): 0.25 INJECTION INTRAMUSCULAR; INTRAVENOUS at 06:29

## 2021-04-20 RX ADMIN — Medication 3: at 22:09

## 2021-04-20 RX ADMIN — SENNA PLUS 2 TABLET(S): 8.6 TABLET ORAL at 22:10

## 2021-04-20 RX ADMIN — PANTOPRAZOLE SODIUM 40 MILLIGRAM(S): 20 TABLET, DELAYED RELEASE ORAL at 06:28

## 2021-04-20 RX ADMIN — ATORVASTATIN CALCIUM 80 MILLIGRAM(S): 80 TABLET, FILM COATED ORAL at 22:10

## 2021-04-20 NOTE — PROGRESS NOTE ADULT - ASSESSMENT
75y.o F Amharic speaking h/o DM, CAD s/p CABG 2014 and prior PCI, severe mitral regurgitation (s/p mitral clip 2019), pulmonary HTN (TTE 2019), HF (EF 21 % June 2019), CKD IV not on dialysis (b/l SCr 2-2.2), admitted for CHF exacerbation, on IV bumex  admitted for tachypnea 2/2 acute on chronic HFrEF exacerbation.

## 2021-04-20 NOTE — CONSULT NOTE ADULT - CONSULT REASON
CAD
recurrent HF exacerbation admissions, family requesting palliative care consult to discuss GOC/future care.
CKD

## 2021-04-20 NOTE — CONSULT NOTE ADULT - TREATMENT GUIDELINE COMMENT
Patient is also DNI; however, for now, plans is to continue current level of care, ongoing work up, and Rxs, trying to improve the patient's condition.

## 2021-04-20 NOTE — PROGRESS NOTE ADULT - SUBJECTIVE AND OBJECTIVE BOX
Adrianna Vilchis PGY1    Patient is a 75y old  Female who presents with a chief complaint of dyspnea (19 Apr 2021 16:20)      SUBJECTIVE / OVERNIGHT EVENTS:    MEDICATIONS  (STANDING):  aspirin enteric coated 81 milliGRAM(s) Oral daily  atorvastatin 80 milliGRAM(s) Oral at bedtime  buMETAnide Injectable 2 milliGRAM(s) IV Push two times a day  clopidogrel Tablet 75 milliGRAM(s) Oral daily  dextrose 40% Gel 15 Gram(s) Oral once  dextrose 5%. 1000 milliLiter(s) (50 mL/Hr) IV Continuous <Continuous>  dextrose 5%. 1000 milliLiter(s) (100 mL/Hr) IV Continuous <Continuous>  dextrose 5%. 1000 milliLiter(s) (50 mL/Hr) IV Continuous <Continuous>  dextrose 50% Injectable 25 Gram(s) IV Push once  dextrose 50% Injectable 12.5 Gram(s) IV Push once  dextrose 50% Injectable 25 Gram(s) IV Push once  glucagon  Injectable 1 milliGRAM(s) IntraMuscular once  heparin   Injectable 5000 Unit(s) SubCutaneous every 8 hours  hydrALAZINE 37.5 milliGRAM(s) Oral three times a day  insulin glargine Injectable (LANTUS) 10 Unit(s) SubCutaneous at bedtime  insulin lispro (ADMELOG) corrective regimen sliding scale   SubCutaneous three times a day before meals  insulin lispro (ADMELOG) corrective regimen sliding scale   SubCutaneous at bedtime  levothyroxine 50 MICROGram(s) Oral daily  melatonin 3 milliGRAM(s) Oral at bedtime  metoprolol succinate  milliGRAM(s) Oral daily  pantoprazole    Tablet 40 milliGRAM(s) Oral before breakfast  senna 2 Tablet(s) Oral at bedtime  sodium bicarbonate 650 milliGRAM(s) Oral daily  spironolactone 12.5 milliGRAM(s) Oral daily    MEDICATIONS  (PRN):  ALPRAZolam 0.25 milliGRAM(s) Oral three times a day PRN anxiety  polyethylene glycol 3350 17 Gram(s) Oral two times a day PRN Constipation      CAPILLARY BLOOD GLUCOSE      POCT Blood Glucose.: 336 mg/dL (19 Apr 2021 21:58)  POCT Blood Glucose.: 247 mg/dL (19 Apr 2021 17:34)  POCT Blood Glucose.: 216 mg/dL (19 Apr 2021 13:11)  POCT Blood Glucose.: 119 mg/dL (19 Apr 2021 09:03)    I&O's Summary    19 Apr 2021 07:01  -  20 Apr 2021 07:00  --------------------------------------------------------  IN: 240 mL / OUT: 700 mL / NET: -460 mL        Vital Signs Last 24 Hrs  T(C): 36.5 (20 Apr 2021 04:15), Max: 36.8 (20 Apr 2021 00:26)  T(F): 97.7 (20 Apr 2021 04:15), Max: 98.2 (20 Apr 2021 00:26)  HR: 82 (20 Apr 2021 04:15) (79 - 88)  BP: 102/55 (20 Apr 2021 04:15) (95/55 - 130/68)  BP(mean): --  RR: 18 (20 Apr 2021 04:15) (16 - 20)  SpO2: 98% (20 Apr 2021 04:15) (93% - 98%)    PHYSICAL EXAM:  CONSTITUTIONAL:   RESPIRATORY: crackles bilateral basilar, no wheeze on 4L NC.  CARDIOVASCULAR: regular rate and rhythm, sinus on telemetry  ABDOMEN: Nontender to palpation, normoactive bowel sounds  MUSCULOSKELETAL: able to move all extremities  NEURO: Non-focal, no tremors    LABS:                        8.8    9.71  )-----------( 374      ( 19 Apr 2021 01:01 )             30.4      04-19    139  |  105  |  52<H>  ----------------------------<  277<H>  4.4   |  20<L>  |  1.71<H>    Ca    9.2      19 Apr 2021 19:33  Phos  3.3     04-19  Mg     2.1     04-19    TPro  7.6  /  Alb  3.4  /  TBili  0.4  /  DBili  x   /  AST  28  /  ALT  16  /  AlkPhos  154<H>  04-19              RADIOLOGY & ADDITIONAL TESTS:    Imaging Personally Reviewed:    Consultant(s) Notes Reviewed:      Care Discussed with Consultants/Other Providers:   Adrianna Deng PGY1    Patient is a 75y old  Female who presents with a chief complaint of dyspnea (19 Apr 2021 16:20)      SUBJECTIVE / OVERNIGHT EVENTS:  - overnight - no events  - am - pt is on 4L NC, mild dyspneic when woken up. denies chest pain.     Per discussion with son, he does not want pt to come home as she is difficult to care for. he does not want nursing home as he does not believe they will provide good care for her. he also does not want end of life care for her and does not only want to focus solely on comfort care.     MEDICATIONS  (STANDING):  aspirin enteric coated 81 milliGRAM(s) Oral daily  atorvastatin 80 milliGRAM(s) Oral at bedtime  buMETAnide Injectable 2 milliGRAM(s) IV Push two times a day  clopidogrel Tablet 75 milliGRAM(s) Oral daily  dextrose 40% Gel 15 Gram(s) Oral once  dextrose 5%. 1000 milliLiter(s) (50 mL/Hr) IV Continuous <Continuous>  dextrose 5%. 1000 milliLiter(s) (100 mL/Hr) IV Continuous <Continuous>  dextrose 5%. 1000 milliLiter(s) (50 mL/Hr) IV Continuous <Continuous>  dextrose 50% Injectable 25 Gram(s) IV Push once  dextrose 50% Injectable 12.5 Gram(s) IV Push once  dextrose 50% Injectable 25 Gram(s) IV Push once  glucagon  Injectable 1 milliGRAM(s) IntraMuscular once  heparin   Injectable 5000 Unit(s) SubCutaneous every 8 hours  hydrALAZINE 37.5 milliGRAM(s) Oral three times a day  insulin glargine Injectable (LANTUS) 10 Unit(s) SubCutaneous at bedtime  insulin lispro (ADMELOG) corrective regimen sliding scale   SubCutaneous three times a day before meals  insulin lispro (ADMELOG) corrective regimen sliding scale   SubCutaneous at bedtime  levothyroxine 50 MICROGram(s) Oral daily  melatonin 3 milliGRAM(s) Oral at bedtime  metoprolol succinate  milliGRAM(s) Oral daily  pantoprazole    Tablet 40 milliGRAM(s) Oral before breakfast  senna 2 Tablet(s) Oral at bedtime  sodium bicarbonate 650 milliGRAM(s) Oral daily  spironolactone 12.5 milliGRAM(s) Oral daily    MEDICATIONS  (PRN):  ALPRAZolam 0.25 milliGRAM(s) Oral three times a day PRN anxiety  polyethylene glycol 3350 17 Gram(s) Oral two times a day PRN Constipation      CAPILLARY BLOOD GLUCOSE      POCT Blood Glucose.: 336 mg/dL (19 Apr 2021 21:58)  POCT Blood Glucose.: 247 mg/dL (19 Apr 2021 17:34)  POCT Blood Glucose.: 216 mg/dL (19 Apr 2021 13:11)  POCT Blood Glucose.: 119 mg/dL (19 Apr 2021 09:03)    I&O's Summary    19 Apr 2021 07:01  -  20 Apr 2021 07:00  --------------------------------------------------------  IN: 240 mL / OUT: 700 mL / NET: -460 mL        Vital Signs Last 24 Hrs  T(C): 36.5 (20 Apr 2021 04:15), Max: 36.8 (20 Apr 2021 00:26)  T(F): 97.7 (20 Apr 2021 04:15), Max: 98.2 (20 Apr 2021 00:26)  HR: 82 (20 Apr 2021 04:15) (79 - 88)  BP: 102/55 (20 Apr 2021 04:15) (95/55 - 130/68)  BP(mean): --  RR: 18 (20 Apr 2021 04:15) (16 - 20)  SpO2: 98% (20 Apr 2021 04:15) (93% - 98%)    PHYSICAL EXAM:  CONSTITUTIONAL:   RESPIRATORY: crackles bilateral basilar, no wheeze on 4L NC.  CARDIOVASCULAR: regular rate and rhythm, sinus on telemetry  ABDOMEN: Nontender to palpation, normoactive bowel sounds  MUSCULOSKELETAL: able to move all extremities  NEURO: Non-focal, no tremors    LABS:                        8.8    9.71  )-----------( 374      ( 19 Apr 2021 01:01 )             30.4      04-19    139  |  105  |  52<H>  ----------------------------<  277<H>  4.4   |  20<L>  |  1.71<H>    Ca    9.2      19 Apr 2021 19:33  Phos  3.3     04-19  Mg     2.1     04-19    TPro  7.6  /  Alb  3.4  /  TBili  0.4  /  DBili  x   /  AST  28  /  ALT  16  /  AlkPhos  154<H>  04-19              RADIOLOGY & ADDITIONAL TESTS:    Imaging Personally Reviewed:    Consultant(s) Notes Reviewed:      Care Discussed with Consultants/Other Providers:

## 2021-04-20 NOTE — PROGRESS NOTE ADULT - SUBJECTIVE AND OBJECTIVE BOX
S: Reported less SOB however remains on 4L NC. Denies chest pain. Review of systems otherwise (-)    Review of Systems:   Constitutional: [ ] fevers, [ ] chills.   Skin: [ ] dry skin. [ ] rashes.  Psychiatric: [ ] depression, [ ] anxiety.   Gastrointestinal: [ ] BRBPR, [ ] melena.   Neurological: [ ] confusion. [ ] seizures. [ ] shuffling gait.   Ears,Nose,Mouth and Throat: [ ] ear pain [ ] sore throat.   Eyes: [ ] diplopia.   Respiratory: [ ] hemoptysis. [ ] shortness of breath  Cardiovascular: See HPI above  Hematologic/Lymphatic: [ ] anemia. [ ] painful nodes. [ ] prolonged bleeding.   Genitourinary: [ ] hematuria. [ ] flank pain.   Endocrine: [ ] significant change in weight. [ ] intolerance to heat and cold.     Review of systems [x ] otherwise negative, [ ] otherwise unable to obtain    FH: no family history of sudden cardiac death in first degree relatives    SH: [ ] tobacco, [ ] alcohol, [ ] drugs      MEDICATIONS  (STANDING):  aspirin enteric coated 81 milliGRAM(s) Oral daily  atorvastatin 80 milliGRAM(s) Oral at bedtime  buMETAnide Injectable 2 milliGRAM(s) IV Push two times a day  clopidogrel Tablet 75 milliGRAM(s) Oral daily  dextrose 40% Gel 15 Gram(s) Oral once  dextrose 5%. 1000 milliLiter(s) (50 mL/Hr) IV Continuous <Continuous>  dextrose 5%. 1000 milliLiter(s) (100 mL/Hr) IV Continuous <Continuous>  dextrose 5%. 1000 milliLiter(s) (50 mL/Hr) IV Continuous <Continuous>  dextrose 50% Injectable 25 Gram(s) IV Push once  dextrose 50% Injectable 12.5 Gram(s) IV Push once  dextrose 50% Injectable 25 Gram(s) IV Push once  glucagon  Injectable 1 milliGRAM(s) IntraMuscular once  heparin   Injectable 5000 Unit(s) SubCutaneous every 8 hours  hydrALAZINE 37.5 milliGRAM(s) Oral three times a day  insulin glargine Injectable (LANTUS) 10 Unit(s) SubCutaneous at bedtime  insulin lispro (ADMELOG) corrective regimen sliding scale   SubCutaneous three times a day before meals  insulin lispro (ADMELOG) corrective regimen sliding scale   SubCutaneous at bedtime  insulin lispro Injectable (ADMELOG) 5 Unit(s) SubCutaneous three times a day before meals  levothyroxine 50 MICROGram(s) Oral daily  melatonin 3 milliGRAM(s) Oral at bedtime  pantoprazole    Tablet 40 milliGRAM(s) Oral before breakfast  senna 2 Tablet(s) Oral at bedtime  sodium bicarbonate 650 milliGRAM(s) Oral daily  spironolactone 12.5 milliGRAM(s) Oral daily    MEDICATIONS  (PRN):  ALPRAZolam 0.25 milliGRAM(s) Oral three times a day PRN anxiety  polyethylene glycol 3350 17 Gram(s) Oral two times a day PRN Constipation      LABS:                          8.4    10.27 )-----------( 357      ( 20 Apr 2021 07:18 )             29.3     Hemoglobin: 8.4 g/dL (04-20 @ 07:18)  Hemoglobin: 8.8 g/dL (04-19 @ 01:01)  Hemoglobin: 8.7 g/dL (04-18 @ 11:35)  Hemoglobin: 9.0 g/dL (04-17 @ 07:22)  Hemoglobin: 9.4 g/dL (04-16 @ 22:59)    04-20    141  |  107  |  54<H>  ----------------------------<  177<H>  4.2   |  21<L>  |  1.69<H>    Ca    8.7      20 Apr 2021 07:18  Phos  3.2     04-20  Mg     2.1     04-20    TPro  7.6  /  Alb  3.4  /  TBili  0.4  /  DBili  x   /  AST  28  /  ALT  16  /  AlkPhos  154<H>  04-19    Creatinine Trend: 1.69<--, 1.71<--, 1.58<--, 1.58<--, 1.50<--, 1.47<--             04-19-21 @ 07:01  -  04-20-21 @ 07:00  --------------------------------------------------------  IN: 240 mL / OUT: 700 mL / NET: -460 mL    04-20-21 @ 07:01  -  04-20-21 @ 15:10  --------------------------------------------------------  IN: 360 mL / OUT: 300 mL / NET: 60 mL        PHYSICAL EXAM  Vital Signs Last 24 Hrs  T(C): 36.2 (20 Apr 2021 13:45), Max: 36.8 (20 Apr 2021 00:26)  T(F): 97.1 (20 Apr 2021 13:45), Max: 98.2 (20 Apr 2021 00:26)  HR: 83 (20 Apr 2021 13:45) (80 - 88)  BP: 110/68 (20 Apr 2021 13:45) (89/56 - 126/64)  BP(mean): --  RR: 18 (20 Apr 2021 13:45) (16 - 20)  SpO2: 98% (20 Apr 2021 13:45) (94% - 99%)        General: Well nourished in no acute distress. Alert and Oriented * 3.   Head: Normocephalic and atraumatic.   Neck: No JVD. No bruits. Supple. Does not appear to be enlarged.   Cardiovascular: + S1,S2 ; RRR Soft systolic murmur at the left lower sternal border. No rubs noted.    Lungs: CTA b/l. No rhonchi, rales or wheezes.   Abdomen: + BS, soft. Non tender. Non distended. No rebound. No guarding.   Extremities: No clubbing/cyanosis/edema.   Neurologic: Moves all four extremities. Full range of motion.   Skin: Warm and moist. The patient's skin has normal elasticity and good skin turgor.   Psychiatric: Appropriate mood and affect.  Musculoskeletal: Normal range of motion, normal strength    TELEMETRY: SR 70-90    < from: TTE with Doppler (w/Cont) (04.19.21 @ 06:38) >  Conclusions:  1. Mitral clip seen. Mild mitral regurgitation.  Mean  transmitral valve gradient equals 5-6 mm Hg, which is  probably normal/milldy elevated  in the setting of a  Mitracip. HR 90s  2. Minimal aortic regurgitation.  3.  Mild left ventricular enlargement.  4. Endocardial visualization enhanced with intravenous  injection of Ultrasonic Enhancing Agent(Definity).  Severe  global left ventricular systolic dysfunction.  5. Right ventricular enlargement with decreased right  ventricular systolic function.  6. Normal tricuspid valve. Moderate-severe tricuspid  regurgitation.  7. Estimated pulmonary artery systolic pressure equals 57  mm Hg, assuming right atrial pressure equals 8 mm Hg,  consistent with moderate pulmonary pressures.    < end of copied text >      ASSESSMENT/PLAN: 	75y.o F Slovak speaking h/o HTN, HLD, DM, CAD s/p CABG 2014 and prior PCI, severe mitral regurgitation (s/p mitral clip 2019), pulmonary HTN (TTE 2019), HF (EF 21 % June 2019), CKD IV not on dialysis (b/l SCr 2-2.2), hypothyroidism admitted for tachypnea 2/2 acute on chronic HFrEF exacerbation.     - Keep net negative with IV bumex/aldactone - strict I/Os  - Wean O2 as tolerated today  - Continue hydralazine/Toprol XL  - dapt/statin for prior stents   - Monitor creatine/lytes  - Anxiety treatment prn per med  - TTE noted - EF 25-30%, decreased RV function, mod-severe TR, mod pulm HTN  - Palliative follow up    Amauri Hill PA-C  Pager: [phone]

## 2021-04-20 NOTE — CONSULT NOTE ADULT - PROBLEM SELECTOR RECOMMENDATION 6
Will continue to f/u for symptoms, GOC, and resources.         Pepe Stevens MD   Geriatrics and Palliative Care (GAP) Consult Service    of Geriatric and Palliative Medicine  [facility]      Please page the following number for clinical matters between the hours of 9 am and 5 pm from Monday through Friday : [phone]    After 5pm and on weekends, please see the contact information below:    In the event of newly developing, evolving, or worsening symptoms, please contact the Palliative Medicine team via pager (if the patient is at [facility] #[phone] or if the patient is at [facility] #[phone]) The Geriatric and Palliative Medicine service has coverage 24 hours a day/ 7 days a week to provide medical recommendations regarding symptom management needs via telephone See GOC above

## 2021-04-20 NOTE — PROGRESS NOTE ADULT - ATTENDING COMMENTS
74yo F Syriac speaking h/o HTN, HLD, DM, CAD s/p CABG 2014 and prior PCI, severe mitral regurgitation (s/p mitral clip 2019), pulmonary HTN (TTE 2019), HF (EF 21 % June 2019), CKD IV not on dialysis (b/l SCr 2-2.2), hypothyroidism admitted for acute hypoxic respiratory failure and acute on chronic HFrEF exacerbation. c/w current dose of IV bumex.  Monitor I/O and daily weights. If UOP not adequate will augment loop diuretic with Metolazone. Wean O2 as tolerated. Trial low dose Xanax for anxiety. Palliative to be consulted given family request. Renal function stable.

## 2021-04-20 NOTE — PROGRESS NOTE ADULT - PROBLEM SELECTOR PLAN 1
- admitted for acute on chronic systolic HF exacerbation (tachypneic to 30s, on non-rebreather in ED, needing bipap on floors for resp distress, now on NC). CXR on admission with pulm edema, BNP 6000. Less likely ACS (trop 49-> 42)  - last HF admission 1/2021, requiring milrinone assisted diuresis.   - continue on 2mg IV bumex BID with net neg 2L over last 24h. continue  - continue home dose: hydral 37.5 TID, metoprolol succinate 100mg QD. started on spironolactone 12.5mg QD this admission.   - TTE 4/19: EF 25-30%, mild MR with clip, severe global LV dysfunction, decreased RV dysfunction, mod-severe TR, mod pulm pressures.   - Cr ~1.5 (baseline 2).  - daily weights, strict in/out  - HF following, f/u recs  - palliative consulted per family request for multiple HF exacerbation admissions

## 2021-04-20 NOTE — CONSULT NOTE ADULT - SUBJECTIVE AND OBJECTIVE BOX
HPI:  75y.o F Mongolian speaking h/o DM, CAD s/p CABG 2014 and prior PCI, severe mitral regurgitation (s/p mitral clip 2019), pulmonary HTN (TTE 2019), HF (EF 21 % June 2019), CKD IV not on dialysis (b/l SCr 2-2.2), admitted for tachypnea 2/2 acute on chronic HFrEF exacerbation. Currently on IV Bumex. Palliative care was called for GOC and ACP.       PERTINENT PM/SXH:   Diabetes mellitus type 2 in nonobese    Diabetes mellitus, type 2    Hyperlipidemia    Hypothyroidism    CAD (coronary artery disease)    Hypertension    GERD (gastroesophageal reflux disease)    Urinary tract infection    Heart failure    H/O pulmonary hypertension      S/P CABG (coronary artery bypass graft)    S/P mitral valve clip implantation      FAMILY HISTORY:  Family history of diabetes mellitus (Sibling)  brothers/sisters    Family history of hypertension (Sibling)  sister      ITEMS NOT CHECKED ARE NOT PRESENT    SOCIAL HISTORY:   Significant other/partner[ ]  Children[ ]  Buddhism/Spirituality:  Substance hx:  [ ]   Tobacco hx:  [ ]   Alcohol hx: [ ]   Home Opioid hx:  [ ] I-Stop Reference No:  Living Situation: [ ]Home  [ ]Long term care  [ ]Rehab [ ]Other  Call daughter Elsa she and   Mohsin  who is on contact information has  same number.  Spoke to pt son in law Mohsin . Introduced myself and give contact information  . discussed role of case management. medical plan of care and discharge plan .  Mohsin verbalized understanding . As per son in law Pt live with him  and pt  daughter in private house . there are  4 step to entrance and pt on main floor.  Pt has a walker and cane but  cannot use. unable to walk . assist x2 to radha  and transfer to  recliner near bed and to bathroom  which is not very away .   Has wheelchair for outdoor use.  Pt was recently [facility] and o2  concentrator and portable tank was delivered( cannot recall company name at  present. ) has glucometer and  daughter does finger stick. [facility] is [facility]. cdpap is other family member [facility]  [phone]/ [phone]  m-f  7hr . s-s 4hr.  Requesting increased in hours . interested in palliative if pt  meet criteria . do not want ltc in ngs home.   ADVANCE DIRECTIVES:    DNR  MOLST  [ ]  Living Will  [ ]   DECISION MAKER(s):  [ ] Health Care Proxy(s)  [ ] Surrogate(s)  [ ] Guardian           Name(s): Phone Number(s):    BASELINE (I)ADL(s) (prior to admission):  [place]: [ ]Total  [ ] Moderate [ ]Dependent    Allergies    azithromycin (Hives; Pruritus)    Intolerances    MEDICATIONS  (STANDING):  aspirin enteric coated 81 milliGRAM(s) Oral daily  atorvastatin 80 milliGRAM(s) Oral at bedtime  buMETAnide Injectable 2 milliGRAM(s) IV Push two times a day  clopidogrel Tablet 75 milliGRAM(s) Oral daily  dextrose 40% Gel 15 Gram(s) Oral once  dextrose 5%. 1000 milliLiter(s) (50 mL/Hr) IV Continuous <Continuous>  dextrose 5%. 1000 milliLiter(s) (100 mL/Hr) IV Continuous <Continuous>  dextrose 5%. 1000 milliLiter(s) (50 mL/Hr) IV Continuous <Continuous>  dextrose 50% Injectable 25 Gram(s) IV Push once  dextrose 50% Injectable 12.5 Gram(s) IV Push once  dextrose 50% Injectable 25 Gram(s) IV Push once  glucagon  Injectable 1 milliGRAM(s) IntraMuscular once  heparin   Injectable 5000 Unit(s) SubCutaneous every 8 hours  hydrALAZINE 37.5 milliGRAM(s) Oral three times a day  insulin glargine Injectable (LANTUS) 10 Unit(s) SubCutaneous at bedtime  insulin lispro (ADMELOG) corrective regimen sliding scale   SubCutaneous three times a day before meals  insulin lispro (ADMELOG) corrective regimen sliding scale   SubCutaneous at bedtime  levothyroxine 50 MICROGram(s) Oral daily  melatonin 3 milliGRAM(s) Oral at bedtime  metoprolol succinate  milliGRAM(s) Oral daily  pantoprazole    Tablet 40 milliGRAM(s) Oral before breakfast  senna 2 Tablet(s) Oral at bedtime  sodium bicarbonate 650 milliGRAM(s) Oral daily  spironolactone 12.5 milliGRAM(s) Oral daily    MEDICATIONS  (PRN):  ALPRAZolam 0.25 milliGRAM(s) Oral three times a day PRN anxiety  polyethylene glycol 3350 17 Gram(s) Oral two times a day PRN Constipation    PRESENT SYMPTOMS: [ ]Unable to obtain due to poor mentation   Source if other than patient:  [ ]Family   [ ]Team     Pain: [ ]yes [ ]no  QOL impact -   Location -                    Aggravating factors -  Quality -  Radiation -  Timing-  Severity (0-10 scale):  Minimal acceptable level (0-10 scale):     CPOT:    https://www.[facility].org/getattachment/[email or web address]/Critical-Care-Pain-Observation-Tool-(CPOT)      PAIN AD Score:     http://geriatrictoolkit.[facility]/cog/painad.pdf (press ctrl +  left click to view)    Dyspnea:                           [ ]Mild [ ]Moderate [ ]Severe  Anxiety:                             [ ]Mild [ ]Moderate [ ]Severe  Fatigue:                             [ ]Mild [ ]Moderate [ ]Severe  Nausea:                             [ ]Mild [ ]Moderate [ ]Severe  Loss of appetite:              [ ]Mild [ ]Moderate [ ]Severe  Constipation:                    [ ]Mild [ ]Moderate [ ]Severe    Other Symptoms:  [ ]All other review of systems negative     Palliative Performance Status Version 2:         %    http://[facility].org/files/news/palliative_performance_scale_ppsv2.pdf  PHYSICAL EXAM:  Vital Signs Last 24 Hrs  T(C): 36.5 (20 Apr 2021 04:15), Max: 36.8 (20 Apr 2021 00:26)  T(F): 97.7 (20 Apr 2021 04:15), Max: 98.2 (20 Apr 2021 00:26)  HR: 80 (20 Apr 2021 10:42) (79 - 88)  BP: 102/55 (20 Apr 2021 04:15) (95/55 - 130/68)  BP(mean): --  RR: 18 (20 Apr 2021 04:15) (16 - 20)  SpO2: 95% (20 Apr 2021 10:42) (93% - 98%) I&O's Summary    19 Apr 2021 07:01  -  20 Apr 2021 07:00  --------------------------------------------------------  IN: 240 mL / OUT: 700 mL / NET: -460 mL      GENERAL:  [ ]Alert  [ ]Oriented x   [ ]Lethargic  [ ]Cachexia  [ ]Unarousable  [ ]Verbal  [ ]Non-Verbal  Behavioral:   [ ] Anxiety  [ ] Delirium [ ] Agitation [ ] Other  HEENT:  [ ]Normal   [ ]Dry mouth   [ ]ET Tube/Trach  [ ]Oral lesions  PULMONARY:   [ ]Clear [ ]Tachypnea  [ ]Audible excessive secretions   [ ]Rhonchi        [ ]Right [ ]Left [ ]Bilateral  [ ]Crackles        [ ]Right [ ]Left [ ]Bilateral  [ ]Wheezing     [ ]Right [ ]Left [ ]Bilateral  [ ]Diminished breath sounds [ ]right [ ]left [ ]bilateral  CARDIOVASCULAR:    [ ]Regular [ ]Irregular [ ]Tachy  [ ]Ramesh [ ]Murmur [ ]Other  GASTROINTESTINAL:  [ ]Soft  [ ]Distended   [ ]+BS  [ ]Non tender [ ]Tender  [ ]PEG [ ]OGT/ NGT  Last BM:     GENITOURINARY:  [ ]Normal [ ] Incontinent   [ ]Oliguria/Anuria   [ ]Oscar  MUSCULOSKELETAL:   [ ]Normal   [ ]Weakness  [ ]Bed/Wheelchair bound [ ]Edema  NEUROLOGIC:   [ ]No focal deficits  [ ]Cognitive impairment  [ ]Dysphagia [ ]Dysarthria [ ]Paresis [ ]Other   SKIN:   [ ]Normal    [ ]Rash  [ ]Pressure ulcer(s)       Present on admission [ ]y [ ]n    CRITICAL CARE:  [ ] Shock Present  [ ]Septic [ ]Cardiogenic [ ]Neurologic [ ]Hypovolemic  [ ]  Vasopressors [ ]  Inotropes   [ ]Respiratory failure present [ ]Mechanical ventilation [ ]Non-invasive ventilatory support [ ]High flow    [ ]Acute  [ ]Chronic [ ]Hypoxic  [ ]Hypercarbic [ ]Other  [ ]Other organ failure     LABS:                        8.4    10.27 )-----------( 357      ( 20 Apr 2021 07:18 )             29.3   04-20    141  |  107  |  54<H>  ----------------------------<  177<H>  4.2   |  21<L>  |  1.69<H>    Ca    8.7      20 Apr 2021 07:18  Phos  3.2     04-20  Mg     2.1     04-20    TPro  7.6  /  Alb  3.4  /  TBili  0.4  /  DBili  x   /  AST  28  /  ALT  16  /  AlkPhos  154<H>  04-19        RADIOLOGY & ADDITIONAL STUDIES:  cho< from: TTE with Doppler (w/Cont) (04.19.21 @ 06:38) >    Patient name: SELVIN CLAIRE  YOB: 1945   Age: 75 (F)   MR#: [id number]  Study Date: 4/19/2021  Location: [address] [place], [place] [id number]: Nilam Shannon, RDCSEF (Visual Estimate): 25-30 %Severe  global left ventricular systolic dysfunction.5. Right ventricular enlargement with decreased right  ventricular systolic function.moderate pulmonary pressures.    < end of copied text >    PROTEIN CALORIE MALNUTRITION PRESENT: [ ]mild [ ]moderate [ ]severe [ ]underweight [ ]morbid obesity  https://www.andeal.org/vault/2440/web/files/ONC/Table_Clinical%20Characteristics%20to%20Document%20Malnutrition-White%20JV%20et%20al%926100.pdf    Height (cm): 149.9 (04-16-21 @ 22:31), 149.9 (01-13-21 @ 01:15)  Weight (kg): 53 (01-25-21 @ 13:11)  BMI (kg/m2): 23.6 (04-16-21 @ 22:31), 23.6 (01-25-21 @ 13:11)    [ ]PPSV2 < or = to 30% [ ]significant weight loss  [ ]poor nutritional intake  [ ]anasarca     Albumin, Serum: 3.4 g/dL (04-19-21 @ 01:13)   [ ]Artificial Nutrition      REFERRALS:   [ ]Chaplaincy  [ ]Hospice  [ ]Child Life  [ ]Social Work  [ ]Case management [ ]Holistic Therapy     Goals of Care Document:   ID # [id number]  HPI:  75y.o F Arabic speaking h/o DM, CAD s/p CABG 2014 and prior PCI, severe mitral regurgitation (s/p mitral clip 2019), pulmonary HTN (TTE 2019), HF (EF 21 % June 2019), CKD IV not on dialysis (b/l SCr 2-2.2), admitted for tachypnea 2/2 acute on chronic HFrEF exacerbation. Currently on IV Bumex. Palliative care was called for GOC and ACP.     4/20. The patient was lethargic and able to nod to simple questions. She denied pain or dyspnea. However, she was not able to follow up a conversation and was mostly non verbal.     PERTINENT PM/SXH:   Diabetes mellitus type 2 in nonobese    Diabetes mellitus, type 2    Hyperlipidemia    Hypothyroidism    CAD (coronary artery disease)    Hypertension    GERD (gastroesophageal reflux disease)    Urinary tract infection    Heart failure    H/O pulmonary hypertension      S/P CABG (coronary artery bypass graft)    S/P mitral valve clip implantation      FAMILY HISTORY:  Family history of diabetes mellitus (Sibling)  brothers/sisters    Family history of hypertension (Sibling)  sister      ITEMS NOT CHECKED ARE NOT PRESENT    SOCIAL HISTORY:   Significant other/partner[x ] Single  Children[x ] Yes  Hoahaoism/Spirituality: Other   Substance hx:  [ ]   Tobacco hx:  [ ]   Alcohol hx: [ ]   Home Opioid hx:  [ ] I-Stop Reference No:  Living Situation: [ x]Home  [ ]Long term care  [ ]Rehab [ ]Other  Call daughter Elsa she and   Mohsin  who is on contact information has  same number.  Spoke to pt son in law Mohsin . Introduced myself and give contact information  . discussed role of case management. medical plan of care and discharge plan .  Mohsin verbalized understanding . As per son in law Pt live with him  and pt  daughter in private house . there are  4 step to entrance and pt on main floor.  Pt has a walker and cane but  cannot use. unable to walk . assist x2 to radha  and transfer to  recliner near bed and to bathroom  which is not very away .   Has wheelchair for outdoor use.  Pt was recently [place] [place] [facility] and o2  concentrator and portable tank was delivered( cannot recall company name at  present. ) has glucometer and  daughter does finger stick. [facility] is [facility]. cdpap is other family member [facility]  [phone]/ [phone]  m-f  7hr . s-s 4hr.  Requesting increased in hours . interested in palliative if pt  meet criteria . do not want ltc in ngs home.   ADVANCE DIRECTIVES:    DNR  MOLST  [ ]  Living Will  [ ]   DECISION MAKER(s):  [x ] Health Care Proxy(s)  [ ] Surrogate(s)  [ ] Guardian           Name(s): Phone Number(s): See GOC note below.     BASELINE (I)ADL(s) (prior to admission):  [place]: [ ]Total  [ ] Moderate [ x]Dependent    Allergies    azithromycin (Hives; Pruritus)    Intolerances    MEDICATIONS  (STANDING):  aspirin enteric coated 81 milliGRAM(s) Oral daily  atorvastatin 80 milliGRAM(s) Oral at bedtime  buMETAnide Injectable 2 milliGRAM(s) IV Push two times a day  clopidogrel Tablet 75 milliGRAM(s) Oral daily  dextrose 40% Gel 15 Gram(s) Oral once  dextrose 5%. 1000 milliLiter(s) (50 mL/Hr) IV Continuous <Continuous>  dextrose 5%. 1000 milliLiter(s) (100 mL/Hr) IV Continuous <Continuous>  dextrose 5%. 1000 milliLiter(s) (50 mL/Hr) IV Continuous <Continuous>  dextrose 50% Injectable 25 Gram(s) IV Push once  dextrose 50% Injectable 12.5 Gram(s) IV Push once  dextrose 50% Injectable 25 Gram(s) IV Push once  glucagon  Injectable 1 milliGRAM(s) IntraMuscular once  heparin   Injectable 5000 Unit(s) SubCutaneous every 8 hours  hydrALAZINE 37.5 milliGRAM(s) Oral three times a day  insulin glargine Injectable (LANTUS) 10 Unit(s) SubCutaneous at bedtime  insulin lispro (ADMELOG) corrective regimen sliding scale   SubCutaneous three times a day before meals  insulin lispro (ADMELOG) corrective regimen sliding scale   SubCutaneous at bedtime  levothyroxine 50 MICROGram(s) Oral daily  melatonin 3 milliGRAM(s) Oral at bedtime  metoprolol succinate  milliGRAM(s) Oral daily  pantoprazole    Tablet 40 milliGRAM(s) Oral before breakfast  senna 2 Tablet(s) Oral at bedtime  sodium bicarbonate 650 milliGRAM(s) Oral daily  spironolactone 12.5 milliGRAM(s) Oral daily    MEDICATIONS  (PRN):  ALPRAZolam 0.25 milliGRAM(s) Oral three times a day PRN anxiety  polyethylene glycol 3350 17 Gram(s) Oral two times a day PRN Constipation    PRESENT SYMPTOMS: [x ]Unable to obtain due to poor mentation   Source if other than patient:  [ ]Family   [ ]Team     Pain: [ ]yes [x ]no  QOL impact -   Location -                    Aggravating factors -  Quality -  Radiation -  Timing-  Severity (0-10 scale):  Minimal acceptable level (0-10 scale):     CPOT:    https://www.[facility].org/getattachment/[email or web address]/Critical-Care-Pain-Observation-Tool-(CPOT)      PAIN AD Score: 0    http://geriatrictoolkit.[facility]/cog/painad.pdf (press ctrl +  left click to view)    Dyspnea:                           [ ]Mild [ ]Moderate [ ]Severe  Anxiety:                             [ ]Mild [ ]Moderate [ ]Severe  Fatigue:                             [ ]Mild [ ]Moderate [ ]Severe  Nausea:                             [ ]Mild [ ]Moderate [ ]Severe  Loss of appetite:              [ ]Mild [ ]Moderate [ ]Severe  Constipation:                    [ ]Mild [ ]Moderate [ ]Severe    Other Symptoms:  [ ]All other review of systems negative     Palliative Performance Status Version 2: 10-20      %    http://npcrc.org/files/news/palliative_performance_scale_ppsv2.pdf  PHYSICAL EXAM:  Vital Signs Last 24 Hrs  T(C): 36.5 (20 Apr 2021 04:15), Max: 36.8 (20 Apr 2021 00:26)  T(F): 97.7 (20 Apr 2021 04:15), Max: 98.2 (20 Apr 2021 00:26)  HR: 80 (20 Apr 2021 10:42) (79 - 88)  BP: 102/55 (20 Apr 2021 04:15) (95/55 - 130/68)  BP(mean): --  RR: 18 (20 Apr 2021 04:15) (16 - 20)  SpO2: 95% (20 Apr 2021 10:42) (93% - 98%) I&O's Summary    19 Apr 2021 07:01  -  20 Apr 2021 07:00  --------------------------------------------------------  IN: 240 mL / OUT: 700 mL / NET: -460 mL      GENERAL:  [ ]Alert  [ ]Oriented x   [x ]Lethargic  [ ]Cachexia  [ ]Unarousable  [ ]Verbal  [x ] Mostly Non-Verbal  Behavioral:   [ ] Anxiety  [ ] Delirium [ ] Agitation [ ] Other  HEENT:  [ x]Normal   [ ]Dry mouth   [ ]ET Tube/Trach  [ ]Oral lesions  PULMONARY:   [ ]Clear [ ]Tachypnea  [ ]Audible excessive secretions   [ ]Rhonchi        [ ]Right [ ]Left [ ]Bilateral  [x ]Crackles        [ ]Right [ ]Left [ x]Bilateral  [ ]Wheezing     [ ]Right [ ]Left [ ]Bilateral  [ x]Diminished breath sounds [ ]right [ ]left [x ]bilateral  CARDIOVASCULAR:    [x ]Regular [ ]Irregular [ ]Tachy  [ ]Ramesh [ ]Murmur [ ]Other  GASTROINTESTINAL:  [x ]Soft  [ ]Distended   [ x]+BS  [x ]Non tender [ ]Tender  [ ]PEG [ ]OGT/ NGT  Last BM: 4/18    GENITOURINARY:  [ ]Normal [ x] Incontinent   [ ]Oliguria/Anuria   [ ]Oscar  MUSCULOSKELETAL:   [ ]Normal   [ x]Weakness  [ ]Bed/Wheelchair bound [ ]Edema  NEUROLOGIC:   [ ]No focal deficits  [ ]Cognitive impairment  [ ]Dysphagia [ ]Dysarthria [ ]Paresis [x ]Other: Lethargic   SKIN:   [x ]Normal    [ ]Rash  [ ]Pressure ulcer(s)       Present on admission [ ]y [ ]n    CRITICAL CARE:  [ ] Shock Present  [ ]Septic [ ]Cardiogenic [ ]Neurologic [ ]Hypovolemic  [ ]  Vasopressors [ ]  Inotropes   [ ]Respiratory failure present [ ]Mechanical ventilation [ ]Non-invasive ventilatory support [ ]High flow    [ ]Acute  [ ]Chronic [ ]Hypoxic  [ ]Hypercarbic [ ]Other  [ ]Other organ failure     LABS:                        8.4    10.27 )-----------( 357      ( 20 Apr 2021 07:18 )             29.3   04-20    141  |  107  |  54<H>  ----------------------------<  177<H>  4.2   |  21<L>  |  1.69<H>    Ca    8.7      20 Apr 2021 07:18  Phos  3.2     04-20  Mg     2.1     04-20    TPro  7.6  /  Alb  3.4  /  TBili  0.4  /  DBili  x   /  AST  28  /  ALT  16  /  AlkPhos  154<H>  04-19        RADIOLOGY & ADDITIONAL STUDIES:  < from: Xray Chest 1 View- PORTABLE-Urgent (Xray Chest 1 View- PORTABLE-Urgent .) (04.17.21 @ 15:41) >  IMPRESSION: Unchanged right pleural effusion and pulmonary edema.                LUANA SOL MD; Attending Radiologist  This document has been electronically signed. Apr 18 2021 10:21AM    < end of copied text >    cho< from: TTE with Doppler (w/Cont) (04.19.21 @ 06:38) >    Patient name: SELVIN CLAIRE  YOB: 1945   Age: 75 (F)   MR#: [id number]  Study Date: 4/19/2021  Location: [address] [place], [place] [id number]: Nilam Shannon RDCSEF (Visual Estimate): 25-30 %Severe  global left ventricular systolic dysfunction.5. Right ventricular enlargement with decreased right  ventricular systolic function.moderate pulmonary pressures.    < end of copied text >    PROTEIN CALORIE MALNUTRITION PRESENT: [ ]mild [ ]moderate [ ]severe [ ]underweight [ ]morbid obesity  https://www.andeal.org/vault/2440/web/files/ONC/Table_Clinical%20Characteristics%20to%20Document%20Malnutrition-White%20JV%20et%20al%202012.pdf    Height (cm): 149.9 (04-16-21 @ 22:31), 149.9 (01-13-21 @ 01:15)  Weight (kg): 53 (01-25-21 @ 13:11)  BMI (kg/m2): 23.6 (04-16-21 @ 22:31), 23.6 (01-25-21 @ 13:11)    [ ]PPSV2 < or = to 30% [ ]significant weight loss  [ ]poor nutritional intake  [ ]anasarca     Albumin, Serum: 3.4 g/dL (04-19-21 @ 01:13)   [ ]Artificial Nutrition      REFERRALS:   [ ]Chaplaincy  [ ]Hospice  [ ]Child Life  [ ]Social Work  [ ]Case management [ ]Holistic Therapy     Goals of Care Document:   ID # [id number].   HPI:  75y.o F Kiswahili speaking h/o DM, CAD s/p CABG 2014 and prior PCI, severe mitral regurgitation (s/p mitral clip 2019), pulmonary HTN (TTE 2019), HF (EF 21 % June 2019), CKD IV not on dialysis (b/l SCr 2-2.2), admitted for tachypnea 2/2 acute on chronic HFrEF exacerbation. Currently on IV Bumex. Palliative care was called for GOC and ACP.     4/20. The patient was lethargic and able to nod to simple questions. She denied pain or dyspnea. However, she was not able to follow up a conversation and was mostly non verbal.     PERTINENT PM/SXH:   Diabetes mellitus type 2 in nonobese    Diabetes mellitus, type 2    Hyperlipidemia    Hypothyroidism    CAD (coronary artery disease)    Hypertension    GERD (gastroesophageal reflux disease)    Urinary tract infection    Heart failure    H/O pulmonary hypertension      S/P CABG (coronary artery bypass graft)    S/P mitral valve clip implantation      FAMILY HISTORY:  Family history of diabetes mellitus (Sibling)  brothers/sisters    Family history of hypertension (Sibling)  sister      ITEMS NOT CHECKED ARE NOT PRESENT    SOCIAL HISTORY:   Significant other/partner[x ] Single  Children[x ] Yes  Zoroastrianism/Spirituality: Other   Substance hx:  [ ]   Tobacco hx:  [ ]   Alcohol hx: [ ]   Home Opioid hx:  [ ] I-Stop Reference No:  Living Situation: [ x]Home  [ ]Long term care  [ ]Rehab [ ]Other  Call daughter Elsa she and   Mohsin  who is on contact information has  same number.  Spoke to pt son in law Mohsin . Introduced myself and give contact information  . discussed role of case management. medical plan of care and discharge plan .  Mohsin verbalized understanding . As per son in law Pt live with him  and pt  daughter in private house . there are  4 step to entrance and pt on main floor.  Pt has a walker and cane but  cannot use. unable to walk . assist x2 to radha  and transfer to  recliner near bed and to bathroom  which is not very away .   Has wheelchair for outdoor use.  Pt was recently [place] [facility] and o2  concentrator and portable tank was delivered( cannot recall company name at  present. ) has glucometer and  daughter does finger stick. [facility] is [facility]. cdpap is other family member [facility]  [phone]/ [phone]  m-f  7hr . s-s 4hr.  Requesting increased in hours . interested in palliative if pt  meet criteria . do not want ltc in ngs home.   ADVANCE DIRECTIVES:    DNR  MOLST  [ ]  Living Will  [ ]   DECISION MAKER(s):  [x ] Health Care Proxy(s)  [ ] Surrogate(s)  [ ] Guardian           Name(s): Phone Number(s): See C note below.     BASELINE (I)ADL(s) (prior to admission):  [place]: [ ]Total  [ ] Moderate [ x]Dependent    Allergies    azithromycin (Hives; Pruritus)    Intolerances    MEDICATIONS  (STANDING):  aspirin enteric coated 81 milliGRAM(s) Oral daily  atorvastatin 80 milliGRAM(s) Oral at bedtime  buMETAnide Injectable 2 milliGRAM(s) IV Push two times a day  clopidogrel Tablet 75 milliGRAM(s) Oral daily  dextrose 40% Gel 15 Gram(s) Oral once  dextrose 5%. 1000 milliLiter(s) (50 mL/Hr) IV Continuous <Continuous>  dextrose 5%. 1000 milliLiter(s) (100 mL/Hr) IV Continuous <Continuous>  dextrose 5%. 1000 milliLiter(s) (50 mL/Hr) IV Continuous <Continuous>  dextrose 50% Injectable 25 Gram(s) IV Push once  dextrose 50% Injectable 12.5 Gram(s) IV Push once  dextrose 50% Injectable 25 Gram(s) IV Push once  glucagon  Injectable 1 milliGRAM(s) IntraMuscular once  heparin   Injectable 5000 Unit(s) SubCutaneous every 8 hours  hydrALAZINE 37.5 milliGRAM(s) Oral three times a day  insulin glargine Injectable (LANTUS) 10 Unit(s) SubCutaneous at bedtime  insulin lispro (ADMELOG) corrective regimen sliding scale   SubCutaneous three times a day before meals  insulin lispro (ADMELOG) corrective regimen sliding scale   SubCutaneous at bedtime  levothyroxine 50 MICROGram(s) Oral daily  melatonin 3 milliGRAM(s) Oral at bedtime  metoprolol succinate  milliGRAM(s) Oral daily  pantoprazole    Tablet 40 milliGRAM(s) Oral before breakfast  senna 2 Tablet(s) Oral at bedtime  sodium bicarbonate 650 milliGRAM(s) Oral daily  spironolactone 12.5 milliGRAM(s) Oral daily    MEDICATIONS  (PRN):  ALPRAZolam 0.25 milliGRAM(s) Oral three times a day PRN anxiety  polyethylene glycol 3350 17 Gram(s) Oral two times a day PRN Constipation    PRESENT SYMPTOMS: [x ]Unable to obtain due to poor mentation   Source if other than patient:  [ ]Family   [ ]Team     Pain: [ ]yes [x ]no  QOL impact -   Location -                    Aggravating factors -  Quality -  Radiation -  Timing-  Severity (0-10 scale):  Minimal acceptable level (0-10 scale):     CPOT:    https://www.[facility].org/getattachment/[email or web address]/Critical-Care-Pain-Observation-Tool-(CPOT)      PAIN AD Score: 0    http://geriatrictoolkit.[facility]/cog/painad.pdf (press ctrl +  left click to view)    Dyspnea:                           [ ]Mild [ ]Moderate [ ]Severe  Anxiety:                             [ ]Mild [ ]Moderate [ ]Severe  Fatigue:                             [ ]Mild [ ]Moderate [ ]Severe  Nausea:                             [ ]Mild [ ]Moderate [ ]Severe  Loss of appetite:              [ ]Mild [ ]Moderate [ ]Severe  Constipation:                    [ ]Mild [ ]Moderate [ ]Severe    Other Symptoms:  [ ]All other review of systems negative     Palliative Performance Status Version 2: 10-20      %    http://npcrc.org/files/news/palliative_performance_scale_ppsv2.pdf  PHYSICAL EXAM:  Vital Signs Last 24 Hrs  T(C): 36.5 (20 Apr 2021 04:15), Max: 36.8 (20 Apr 2021 00:26)  T(F): 97.7 (20 Apr 2021 04:15), Max: 98.2 (20 Apr 2021 00:26)  HR: 80 (20 Apr 2021 10:42) (79 - 88)  BP: 102/55 (20 Apr 2021 04:15) (95/55 - 130/68)  BP(mean): --  RR: 18 (20 Apr 2021 04:15) (16 - 20)  SpO2: 95% (20 Apr 2021 10:42) (93% - 98%) I&O's Summary    19 Apr 2021 07:01  -  20 Apr 2021 07:00  --------------------------------------------------------  IN: 240 mL / OUT: 700 mL / NET: -460 mL      GENERAL:  [ ]Alert  [ ]Oriented x   [x ]Lethargic  [ ]Cachexia  [ ]Unarousable  [ ]Verbal  [x ] Mostly Non-Verbal  Behavioral:   [ ] Anxiety  [ ] Delirium [ ] Agitation [ ] Other  HEENT:  [ x]Normal   [ ]Dry mouth   [ ]ET Tube/Trach  [ ]Oral lesions  PULMONARY:   [ ]Clear [ ]Tachypnea  [ ]Audible excessive secretions   [ ]Rhonchi        [ ]Right [ ]Left [ ]Bilateral  [x ]Crackles        [ ]Right [ ]Left [ x]Bilateral  [ ]Wheezing     [ ]Right [ ]Left [ ]Bilateral  [ x]Diminished breath sounds [ ]right [ ]left [x ]bilateral  [x] labored breathing   CARDIOVASCULAR:    [x ]Regular [ ]Irregular [ ]Tachy  [ ]Ramesh [ ]Murmur [ ]Other  GASTROINTESTINAL:  [x ]Soft  [ ]Distended   [ x]+BS  [x ]Non tender [ ]Tender  [ ]PEG [ ]OGT/ NGT  Last BM: 4/18    GENITOURINARY:  [ ]Normal [ x] Incontinent   [ ]Oliguria/Anuria   [ ]Oscar  MUSCULOSKELETAL:   [ ]Normal   [ x]Weakness  [ ]Bed/Wheelchair bound [ ]Edema  NEUROLOGIC:   [ ]No focal deficits  [ ]Cognitive impairment  [ ]Dysphagia [ ]Dysarthria [ ]Paresis [x ]Other: Lethargic   SKIN:   [x ]Normal    [ ]Rash  [ ]Pressure ulcer(s)       Present on admission [ ]y [ ]n    CRITICAL CARE:  [ ] Shock Present  [ ]Septic [ ]Cardiogenic [ ]Neurologic [ ]Hypovolemic  [ ]  Vasopressors [ ]  Inotropes   [ ]Respiratory failure present [ ]Mechanical ventilation [ ]Non-invasive ventilatory support [ ]High flow    [ ]Acute  [ ]Chronic [ ]Hypoxic  [ ]Hypercarbic [ ]Other  [ ]Other organ failure     LABS:                        8.4    10.27 )-----------( 357      ( 20 Apr 2021 07:18 )             29.3   04-20    141  |  107  |  54<H>  ----------------------------<  177<H>  4.2   |  21<L>  |  1.69<H>    Ca    8.7      20 Apr 2021 07:18  Phos  3.2     04-20  Mg     2.1     04-20    TPro  7.6  /  Alb  3.4  /  TBili  0.4  /  DBili  x   /  AST  28  /  ALT  16  /  AlkPhos  154<H>  04-19        RADIOLOGY & ADDITIONAL STUDIES:  < from: Xray Chest 1 View- PORTABLE-Urgent (Xray Chest 1 View- PORTABLE-Urgent .) (04.17.21 @ 15:41) >  IMPRESSION: Unchanged right pleural effusion and pulmonary edema.                LUANA SOL MD; Attending Radiologist  This document has been electronically signed. Apr 18 2021 10:21AM    < end of copied text >    cho< from: TTE with Doppler (w/Cont) (04.19.21 @ 06:38) >    Patient name: SELVIN CLAIRE  YOB: 1945   Age: 75 (F)   MR#: [id number]  Study Date: 4/19/2021  Location: [address] [place], [place] [id number]: Nilam Shannon, RDCSEF (Visual Estimate): 25-30 %Severe  global left ventricular systolic dysfunction.5. Right ventricular enlargement with decreased right  ventricular systolic function.moderate pulmonary pressures.    < end of copied text >    PROTEIN CALORIE MALNUTRITION PRESENT: [ ]mild [ ]moderate [ ]severe [ ]underweight [ ]morbid obesity  https://www.andeal.org/vault/2440/web/files/ONC/Table_Clinical%20Characteristics%20to%20Document%20Malnutrition-White%20JV%20et%20al%202012.pdf    Height (cm): 149.9 (04-16-21 @ 22:31), 149.9 (01-13-21 @ 01:15)  Weight (kg): 53 (01-25-21 @ 13:11)  BMI (kg/m2): 23.6 (04-16-21 @ 22:31), 23.6 (01-25-21 @ 13:11)    [ ]PPSV2 < or = to 30% [ ]significant weight loss  [ ]poor nutritional intake  [ ]anasarca     Albumin, Serum: 3.4 g/dL (04-19-21 @ 01:13)   [ ]Artificial Nutrition      REFERRALS:   [ ]Chaplaincy  [ ]Hospice  [ ]Child Life  [ ]Social Work  [ ]Case management [ ]Holistic Therapy     Goals of Care Document:

## 2021-04-20 NOTE — CONSULT NOTE ADULT - CONVERSATION DETAILS
D/w the patient's son in law (Mohsin Zaman) and the patient's daughter (Elsa Negrete) about the patient's end stage heart failure (with biventricular dysfunction) and ESRD (not on HD and unlikely to be a good candidate for LT HD) and poor prognosis. They gave verbalized understanding about the info given. They indicated GOC are towards continuing all available Rxs in order to improve the patient's cardiac condition. They were hoping she may improve to the point that she may become more functional; however, I stated that was unlikely. They wanted to wait one to two more days to see where her condition was going. Nonetheless, they agree with DNR/I. D/w the patient's son in law (Mohsin Zaman) and the patient's daughter (Elsa Negrete) about the patient's end stage heart failure (with biventricular dysfunction), ESRD (not on HD and unlikely to be a good candidate for LT HD) and poor prognosis. They gave verbalized understanding about the info given. They indicated GOC are towards continuing all available Rxs in order to improve the patient's cardiac condition. They were hoping she may improve to the point that she may become more functional; however, I stated that it was unlikely. They wanted to wait one to two more days to see where her condition was going. Nonetheless, they agree with DNR/I.

## 2021-04-20 NOTE — CONSULT NOTE ADULT - ASSESSMENT
full note to f/u.     Today when seen the patient was deeply lethargy. Work up an Rx as per primary team. However, will DC Xanax and start Lexapro instead.   GOC as above. A MOLST for was completed.  75y.o F Maltese speaking h/o DM, CAD s/p CABG 2014 and prior PCI, severe mitral regurgitation (s/p mitral clip 2019), pulmonary HTN (TTE 2019), HF (EF 21 % June 2019), CKD IV not on dialysis (b/l SCr 2-2.2), admitted for tachypnea 2/2 acute on chronic HFrEF exacerbation. Currently on IV Bumex. Palliative care was called for GOC and ACP.

## 2021-04-20 NOTE — PROGRESS NOTE ADULT - PROBLEM SELECTOR PLAN 5
cw synthroid Scribe Attestation (For Scribes USE Only)... Attending Attestation (For Attendings USE Only).../Scribe Attestation (For Scribes USE Only)...

## 2021-04-20 NOTE — PROGRESS NOTE ADULT - ASSESSMENT
75y.o F Japanese speaking h/o HTN, HLD, DM, CAD s/p CABG 2014 and prior PCI, severe mitral regurgitation (s/p mitral clip 2019), pulmonary HTN (TTE 2019), HF (EF 21 % June 2019), CKD IV not on dialysis (b/l SCr 2-2.2), hypothyroidism comes to the Ed with 7 days of SOB. Per chart review, patient recently admitted for hypoxic respiratory failure 2/2 acute on chronic HFrEF exacerbation in the setting of mitral stenosis c/b MERVIN on CKD.  Pt initially treated w/ IV diuresis but was started on milrinone drip after RHC showed persistent elevated filled pressures. She received IV diuresis, milrinone continued dyspnea despite clinical euvolemic status.  PRN xanax started for possible anxiety component.  Labs w/ intermittent elevations in WBC in the setting of asymptomatic bacteruria    CKD stage 4  - baseline Cr 1.9-2.2; has had recurrent MERVIN's over the years  - CKD is likely 2/2 recurrent MERVIN's + underlying DM/HTN  - Renal function stable   - Continue Diuretics per cardiology. 1.4L urine output overnight. Consider adding metolazone for additional diuresis   - Avoid hypotension   - Avoid nephrotoxins including NSAIDs, IV contrast (if possible), Fleet's products  - monitor I/O's accurately    Acidosis  serum bicarb at goal for CKD   Continue sodium bicarb daily  MOnitor serum CO2    HTN  BP controlled  avoid hypotension  Low salt diet   MOnitor BP    SOB  likely sec to CHF  Follow up Cardiology  Monitor daily weights     Hypokalemia  sec to diuretics  improved   MOnitor serm K

## 2021-04-20 NOTE — CONSULT NOTE ADULT - PROBLEM SELECTOR RECOMMENDATION 2
HF Rx as above.   Family would like to continue current level of care and waiting 1-2 days to see if the patient has some improvement with current Rxs, if not improvement, they may be open to re-evaluating GOC.  If GOC were to change towards a symptoms driven approach, will recommend Dilaudid 0.2 mg q 4 PRN

## 2021-04-20 NOTE — CONSULT NOTE ADULT - PROBLEM SELECTOR RECOMMENDATION 3
The patient's family indicated the patient was not actually taking Xanax recently. Due to lethargy will DC for now.  Will add Lexapro 5 mg q daily

## 2021-04-20 NOTE — CONSULT NOTE ADULT - PROBLEM SELECTOR RECOMMENDATION 7
Will continue to f/u for symptoms, GOC, and resources.         Pepe Stevens MD   Geriatrics and Palliative Care (GAP) Consult Service    of Geriatric and Palliative Medicine  [facility]      Please page the following number for clinical matters between the hours of 9 am and 5 pm from Monday through Friday : [phone]    After 5pm and on weekends, please see the contact information below:    In the event of newly developing, evolving, or worsening symptoms, please contact the Palliative Medicine team via pager (if the patient is at [facility] #[phone] or if the patient is at [facility] #[phone]) The Geriatric and Palliative Medicine service has coverage 24 hours a day/ 7 days a week to provide medical recommendations regarding symptom management needs via telephone

## 2021-04-21 ENCOUNTER — TRANSCRIPTION ENCOUNTER (OUTPATIENT)
Age: 76
End: 2021-04-21

## 2021-04-21 DIAGNOSIS — R53.2 FUNCTIONAL QUADRIPLEGIA: ICD-10-CM

## 2021-04-21 DIAGNOSIS — I50.814 RIGHT HEART FAILURE DUE TO LEFT HEART FAILURE: ICD-10-CM

## 2021-04-21 DIAGNOSIS — R06.4 HYPERVENTILATION: ICD-10-CM

## 2021-04-21 DIAGNOSIS — Z71.89 OTHER SPECIFIED COUNSELING: ICD-10-CM

## 2021-04-21 DIAGNOSIS — N18.4 CHRONIC KIDNEY DISEASE, STAGE 4 (SEVERE): ICD-10-CM

## 2021-04-21 DIAGNOSIS — Z51.5 ENCOUNTER FOR PALLIATIVE CARE: ICD-10-CM

## 2021-04-21 DIAGNOSIS — G93.40 ENCEPHALOPATHY, UNSPECIFIED: ICD-10-CM

## 2021-04-21 LAB
ANION GAP SERPL CALC-SCNC: 12 MMOL/L — SIGNIFICANT CHANGE UP (ref 5–17)
ANION GAP SERPL CALC-SCNC: 14 MMOL/L — SIGNIFICANT CHANGE UP (ref 5–17)
BUN SERPL-MCNC: 61 MG/DL — HIGH (ref 7–23)
BUN SERPL-MCNC: 67 MG/DL — HIGH (ref 7–23)
CALCIUM SERPL-MCNC: 8.7 MG/DL — SIGNIFICANT CHANGE UP (ref 8.4–10.5)
CALCIUM SERPL-MCNC: 9.2 MG/DL — SIGNIFICANT CHANGE UP (ref 8.4–10.5)
CHLORIDE SERPL-SCNC: 103 MMOL/L — SIGNIFICANT CHANGE UP (ref 96–108)
CHLORIDE SERPL-SCNC: 103 MMOL/L — SIGNIFICANT CHANGE UP (ref 96–108)
CO2 SERPL-SCNC: 19 MMOL/L — LOW (ref 22–31)
CO2 SERPL-SCNC: 23 MMOL/L — SIGNIFICANT CHANGE UP (ref 22–31)
CREAT SERPL-MCNC: 1.8 MG/DL — HIGH (ref 0.5–1.3)
CREAT SERPL-MCNC: 1.88 MG/DL — HIGH (ref 0.5–1.3)
GLUCOSE BLDC GLUCOMTR-MCNC: 138 MG/DL — HIGH (ref 70–99)
GLUCOSE BLDC GLUCOMTR-MCNC: 214 MG/DL — HIGH (ref 70–99)
GLUCOSE BLDC GLUCOMTR-MCNC: 266 MG/DL — HIGH (ref 70–99)
GLUCOSE BLDC GLUCOMTR-MCNC: 291 MG/DL — HIGH (ref 70–99)
GLUCOSE BLDC GLUCOMTR-MCNC: 313 MG/DL — HIGH (ref 70–99)
GLUCOSE SERPL-MCNC: 142 MG/DL — HIGH (ref 70–99)
GLUCOSE SERPL-MCNC: 215 MG/DL — HIGH (ref 70–99)
HCT VFR BLD CALC: 29 % — LOW (ref 34.5–45)
HGB BLD-MCNC: 8.5 G/DL — LOW (ref 11.5–15.5)
MAGNESIUM SERPL-MCNC: 2.1 MG/DL — SIGNIFICANT CHANGE UP (ref 1.6–2.6)
MAGNESIUM SERPL-MCNC: 2.1 MG/DL — SIGNIFICANT CHANGE UP (ref 1.6–2.6)
MCHC RBC-ENTMCNC: 22.8 PG — LOW (ref 27–34)
MCHC RBC-ENTMCNC: 29.3 GM/DL — LOW (ref 32–36)
MCV RBC AUTO: 77.7 FL — LOW (ref 80–100)
NRBC # BLD: 0 /100 WBCS — SIGNIFICANT CHANGE UP (ref 0–0)
PHOSPHATE SERPL-MCNC: 3.1 MG/DL — SIGNIFICANT CHANGE UP (ref 2.5–4.5)
PHOSPHATE SERPL-MCNC: 3.2 MG/DL — SIGNIFICANT CHANGE UP (ref 2.5–4.5)
PLATELET # BLD AUTO: 351 K/UL — SIGNIFICANT CHANGE UP (ref 150–400)
POTASSIUM SERPL-MCNC: 3.8 MMOL/L — SIGNIFICANT CHANGE UP (ref 3.5–5.3)
POTASSIUM SERPL-MCNC: 4.3 MMOL/L — SIGNIFICANT CHANGE UP (ref 3.5–5.3)
POTASSIUM SERPL-SCNC: 3.8 MMOL/L — SIGNIFICANT CHANGE UP (ref 3.5–5.3)
POTASSIUM SERPL-SCNC: 4.3 MMOL/L — SIGNIFICANT CHANGE UP (ref 3.5–5.3)
RBC # BLD: 3.73 M/UL — LOW (ref 3.8–5.2)
RBC # FLD: 19.8 % — HIGH (ref 10.3–14.5)
SODIUM SERPL-SCNC: 136 MMOL/L — SIGNIFICANT CHANGE UP (ref 135–145)
SODIUM SERPL-SCNC: 138 MMOL/L — SIGNIFICANT CHANGE UP (ref 135–145)
WBC # BLD: 10.13 K/UL — SIGNIFICANT CHANGE UP (ref 3.8–10.5)
WBC # FLD AUTO: 10.13 K/UL — SIGNIFICANT CHANGE UP (ref 3.8–10.5)

## 2021-04-21 PROCEDURE — 99233 SBSQ HOSP IP/OBS HIGH 50: CPT | Mod: GC

## 2021-04-21 PROCEDURE — 99233 SBSQ HOSP IP/OBS HIGH 50: CPT

## 2021-04-21 PROCEDURE — 99358 PROLONG SERVICE W/O CONTACT: CPT

## 2021-04-21 RX ORDER — INSULIN LISPRO 100/ML
4 VIAL (ML) SUBCUTANEOUS ONCE
Refills: 0 | Status: COMPLETED | OUTPATIENT
Start: 2021-04-21 | End: 2021-04-21

## 2021-04-21 RX ORDER — POTASSIUM CHLORIDE 20 MEQ
40 PACKET (EA) ORAL ONCE
Refills: 0 | Status: COMPLETED | OUTPATIENT
Start: 2021-04-21 | End: 2021-04-21

## 2021-04-21 RX ORDER — INSULIN GLARGINE 100 [IU]/ML
20 INJECTION, SOLUTION SUBCUTANEOUS AT BEDTIME
Refills: 0 | Status: DISCONTINUED | OUTPATIENT
Start: 2021-04-21 | End: 2021-04-24

## 2021-04-21 RX ORDER — INSULIN GLARGINE 100 [IU]/ML
23 INJECTION, SOLUTION SUBCUTANEOUS AT BEDTIME
Refills: 0 | Status: DISCONTINUED | OUTPATIENT
Start: 2021-04-21 | End: 2021-04-21

## 2021-04-21 RX ORDER — INSULIN LISPRO 100/ML
10 VIAL (ML) SUBCUTANEOUS
Refills: 0 | Status: DISCONTINUED | OUTPATIENT
Start: 2021-04-21 | End: 2021-05-03

## 2021-04-21 RX ADMIN — Medication 100 MILLIGRAM(S): at 05:53

## 2021-04-21 RX ADMIN — HEPARIN SODIUM 5000 UNIT(S): 5000 INJECTION INTRAVENOUS; SUBCUTANEOUS at 05:54

## 2021-04-21 RX ADMIN — Medication 37.5 MILLIGRAM(S): at 21:12

## 2021-04-21 RX ADMIN — INSULIN GLARGINE 20 UNIT(S): 100 INJECTION, SOLUTION SUBCUTANEOUS at 21:11

## 2021-04-21 RX ADMIN — SENNA PLUS 2 TABLET(S): 8.6 TABLET ORAL at 21:11

## 2021-04-21 RX ADMIN — Medication 650 MILLIGRAM(S): at 13:20

## 2021-04-21 RX ADMIN — HEPARIN SODIUM 5000 UNIT(S): 5000 INJECTION INTRAVENOUS; SUBCUTANEOUS at 21:13

## 2021-04-21 RX ADMIN — PANTOPRAZOLE SODIUM 40 MILLIGRAM(S): 20 TABLET, DELAYED RELEASE ORAL at 05:53

## 2021-04-21 RX ADMIN — Medication 3 MILLIGRAM(S): at 21:12

## 2021-04-21 RX ADMIN — Medication 7 UNIT(S): at 14:04

## 2021-04-21 RX ADMIN — Medication 50 MICROGRAM(S): at 05:53

## 2021-04-21 RX ADMIN — CLOPIDOGREL BISULFATE 75 MILLIGRAM(S): 75 TABLET, FILM COATED ORAL at 13:20

## 2021-04-21 RX ADMIN — SPIRONOLACTONE 12.5 MILLIGRAM(S): 25 TABLET, FILM COATED ORAL at 05:53

## 2021-04-21 RX ADMIN — Medication 4: at 18:14

## 2021-04-21 RX ADMIN — Medication 3: at 14:04

## 2021-04-21 RX ADMIN — Medication 37.5 MILLIGRAM(S): at 13:20

## 2021-04-21 RX ADMIN — Medication 4 UNIT(S): at 00:55

## 2021-04-21 RX ADMIN — Medication 10 UNIT(S): at 18:28

## 2021-04-21 RX ADMIN — Medication 37.5 MILLIGRAM(S): at 07:03

## 2021-04-21 RX ADMIN — Medication 81 MILLIGRAM(S): at 13:20

## 2021-04-21 RX ADMIN — ESCITALOPRAM OXALATE 5 MILLIGRAM(S): 10 TABLET, FILM COATED ORAL at 13:19

## 2021-04-21 RX ADMIN — BUMETANIDE 2 MILLIGRAM(S): 0.25 INJECTION INTRAMUSCULAR; INTRAVENOUS at 05:54

## 2021-04-21 RX ADMIN — BUMETANIDE 2 MILLIGRAM(S): 0.25 INJECTION INTRAMUSCULAR; INTRAVENOUS at 17:36

## 2021-04-21 RX ADMIN — Medication 40 MILLIEQUIVALENT(S): at 09:35

## 2021-04-21 RX ADMIN — HEPARIN SODIUM 5000 UNIT(S): 5000 INJECTION INTRAVENOUS; SUBCUTANEOUS at 13:20

## 2021-04-21 RX ADMIN — Medication 7 UNIT(S): at 09:37

## 2021-04-21 RX ADMIN — ATORVASTATIN CALCIUM 80 MILLIGRAM(S): 80 TABLET, FILM COATED ORAL at 21:12

## 2021-04-21 NOTE — PROGRESS NOTE ADULT - ATTENDING COMMENTS
76yo F Urdu speaking h/o HTN, HLD, DM, CAD s/p CABG 2014 and prior PCI, severe mitral regurgitation (s/p mitral clip 2019), pulmonary HTN (TTE 2019), HF (EF 21 % June 2019), CKD IV not on dialysis (b/l SCr 2-2.2), hypothyroidism admitted for acute hypoxic respiratory failure and acute on chronic HFrEF exacerbation. c/w current dose of IV bumex. Net neg 800cc over last 24 hrs after Metolaxone 2.5mg x1.  Will dose Metolazone again x1 today. Monitor I/O and daily weights. Wean O2 as tolerated. Zoloft started for anxiety. Palliative consulted given family request. Renal function stable.

## 2021-04-21 NOTE — DISCHARGE NOTE PROVIDER - NSDCCPCAREPLAN_GEN_ALL_CORE_FT
PRINCIPAL DISCHARGE DIAGNOSIS  Diagnosis: CHF (congestive heart failure)  Assessment and Plan of Treatment: You came in with a heart failure exacerbation and required IV medication to remove fluid from your body and help relieve your shortness of breath.      SECONDARY DISCHARGE DIAGNOSES  Diagnosis: Anxiety  Assessment and Plan of Treatment: You were started on a medication called lexapro to help you with anxiety.     PRINCIPAL DISCHARGE DIAGNOSIS  Diagnosis: CHF (congestive heart failure)  Assessment and Plan of Treatment: You came in with a heart failure exacerbation and required IV medication to remove fluid from your body and help relieve your shortness of breath.  You have end stage heart failure, and continue to have shortness of breath at rest. You will need dilaudid to manage your symptoms.      SECONDARY DISCHARGE DIAGNOSES  Diagnosis: Anxiety  Assessment and Plan of Treatment: You were started on a medication called lexapro to help you with anxiety. You were also started on ativan as well.     PRINCIPAL DISCHARGE DIAGNOSIS  Diagnosis: CHF (congestive heart failure)  Assessment and Plan of Treatment: You came in with a heart failure exacerbation and required IV medication to remove fluid from your body and help relieve your shortness of breath.  You have end stage heart failure, and continue to have shortness of breath at rest. You will need dilaudid to manage your symptoms of shortness of breath.      SECONDARY DISCHARGE DIAGNOSES  Diagnosis: Anxiety  Assessment and Plan of Treatment: You were started on a medication called lexapro to help you with symptoms of anxiety. We also gave you as needed medications when your symptoms were uncontrolled on lexapro. Please continue to take this as needed medication when your symptoms are uncontrollable.

## 2021-04-21 NOTE — PROGRESS NOTE ADULT - SUBJECTIVE AND OBJECTIVE BOX
ID # [id number]   HPI:  75y.o F Slovenian speaking h/o DM, CAD s/p CABG 2014 and prior PCI, severe mitral regurgitation (s/p mitral clip 2019), pulmonary HTN (TTE 2019), HF (EF 21 % June 2019), CKD IV not on dialysis (b/l SCr 2-2.2), admitted for tachypnea 2/2 acute on chronic HFrEF exacerbation. Currently on IV Bumex. Palliative care was called for GOC and ACP.     4/20. The patient was lethargic and able to nod to simple questions. She denied pain or dyspnea. However, she was not able to follow up a conversation and was mostly non verbal.   4/21. The patient was still lethargic but less compared with yesterday. She was able to denied pain or dyspnea, though she was still having labored breathing. She was not able to have a discussion about her illness or Rx options.     PERTINENT PM/SXH:   Diabetes mellitus type 2 in nonobese    Diabetes mellitus, type 2    Hyperlipidemia    Hypothyroidism    CAD (coronary artery disease)    Hypertension    GERD (gastroesophageal reflux disease)    Urinary tract infection    Heart failure    H/O pulmonary hypertension      S/P CABG (coronary artery bypass graft)    S/P mitral valve clip implantation      FAMILY HISTORY:  Family history of diabetes mellitus (Sibling)  brothers/sisters    Family history of hypertension (Sibling)  sister      ITEMS NOT CHECKED ARE NOT PRESENT    SOCIAL HISTORY:   Significant other/partner[x ] Single  Children[x ] Yes  Hoahaoism/Spirituality: Other   Substance hx:  [ ]   Tobacco hx:  [ ]   Alcohol hx: [ ]   Home Opioid hx:  [ ] I-Stop Reference No:  Living Situation: [ x]Home  [ ]Long term care  [ ]Rehab [ ]Other  Call daughter Elsa she and   Mohsin  who is on contact information has  same number.  Spoke to pt son in law Mohsin . Introduced myself and give contact information  . discussed role of case management. medical plan of care and discharge plan .  Mohsin verbalized understanding . As per son in law Pt live with him  and pt  daughter in private house . there are  4 step to entrance and pt on main floor.  Pt has a walker and cane but  cannot use. unable to walk . assist x2 to radha  and transfer to  recliner near bed and to bathroom  which is not very away .   Has wheelchair for outdoor use.  Pt was recently d/c from [place] and o2  concentrator and portable tank was delivered( cannot recall company name at  present. ) has glucometer and  daughter does finger stick. [facility] is [facility]. cdpap is other family member [facility]  [phone]/ [phone]  m-f  7hr . s-s 4hr.  Requesting increased in hours . interested in palliative if pt  meet criteria . do not want ltc in ngs home.   ADVANCE DIRECTIVES:    DNR  MOLST  [ ]  Living Will  [ ]   DECISION MAKER(s):  [x ] Health Care Proxy(s)  [ ] Surrogate(s)  [ ] Guardian           Name(s): Phone Number(s): See GOC note below.     BASELINE (I)ADL(s) (prior to admission):  [place]: [ ]Total  [ ] Moderate [ x]Dependent    Allergies    azithromycin (Hives; Pruritus)    Intolerances    MEDICATIONS  (STANDING):  aspirin enteric coated 81 milliGRAM(s) Oral daily  atorvastatin 80 milliGRAM(s) Oral at bedtime  buMETAnide Injectable 2 milliGRAM(s) IV Push two times a day  clopidogrel Tablet 75 milliGRAM(s) Oral daily  dextrose 40% Gel 15 Gram(s) Oral once  dextrose 5%. 1000 milliLiter(s) (50 mL/Hr) IV Continuous <Continuous>  dextrose 5%. 1000 milliLiter(s) (100 mL/Hr) IV Continuous <Continuous>  dextrose 5%. 1000 milliLiter(s) (50 mL/Hr) IV Continuous <Continuous>  dextrose 50% Injectable 25 Gram(s) IV Push once  dextrose 50% Injectable 12.5 Gram(s) IV Push once  dextrose 50% Injectable 25 Gram(s) IV Push once  escitalopram 5 milliGRAM(s) Oral daily  glucagon  Injectable 1 milliGRAM(s) IntraMuscular once  heparin   Injectable 5000 Unit(s) SubCutaneous every 8 hours  hydrALAZINE 37.5 milliGRAM(s) Oral three times a day  insulin glargine Injectable (LANTUS) 23 Unit(s) SubCutaneous at bedtime  insulin lispro (ADMELOG) corrective regimen sliding scale   SubCutaneous three times a day before meals  insulin lispro (ADMELOG) corrective regimen sliding scale   SubCutaneous at bedtime  insulin lispro Injectable (ADMELOG) 10 Unit(s) SubCutaneous three times a day before meals  levothyroxine 50 MICROGram(s) Oral daily  melatonin 3 milliGRAM(s) Oral at bedtime  metoprolol succinate  milliGRAM(s) Oral daily  pantoprazole    Tablet 40 milliGRAM(s) Oral before breakfast  senna 2 Tablet(s) Oral at bedtime  sodium bicarbonate 650 milliGRAM(s) Oral daily  spironolactone 12.5 milliGRAM(s) Oral daily    MEDICATIONS  (PRN):  polyethylene glycol 3350 17 Gram(s) Oral two times a day PRN Constipation      PRESENT SYMPTOMS: [x ]Unable to obtain due to poor mentation   Source if other than patient:  [ ]Family   [ ]Team     Pain: [ ]yes [x ]no  QOL impact -   Location -                    Aggravating factors -  Quality -  Radiation -  Timing-  Severity (0-10 scale):  Minimal acceptable level (0-10 scale):     CPOT:    https://www.[facility].org/getattachment/[email or web address]/Critical-Care-Pain-Observation-Tool-(CPOT)      PAIN AD Score: 0    http://geriatrictoolkit.[facility]/cog/painad.pdf (press ctrl +  left click to view)    Dyspnea:                           [ ]Mild [ ]Moderate [ ]Severe  Anxiety:                             [ ]Mild [ ]Moderate [ ]Severe  Fatigue:                             [ ]Mild [ ]Moderate [ ]Severe  Nausea:                             [ ]Mild [ ]Moderate [ ]Severe  Loss of appetite:              [ ]Mild [ ]Moderate [ ]Severe  Constipation:                    [ ]Mild [ ]Moderate [ ]Severe    Other Symptoms:  [ ]All other review of systems negative     Palliative Performance Status Version 2: 10-20      %    http://npcrc.org/files/news/palliative_performance_scale_ppsv2.pdf  PHYSICAL EXAM:  Vital Signs Last 24 Hrs  T(C): 36.6 (21 Apr 2021 13:08), Max: 36.7 (21 Apr 2021 06:37)  T(F): 97.8 (21 Apr 2021 13:08), Max: 98 (21 Apr 2021 06:37)  HR: 78 (21 Apr 2021 17:25) (76 - 85)  BP: 106/58 (21 Apr 2021 17:25) (103/68 - 120/60)  BP(mean): --  RR: 17 (21 Apr 2021 13:08) (17 - 18)  SpO2: 95% (21 Apr 2021 13:08) (95% - 98%)      GENERAL:  [ ]Alert  [ ]Oriented x   [x ]Lethargic  [ ]Cachexia  [ ]Unarousable  [ ]Verbal  [x ] Mostly Non-Verbal  Behavioral:   [ ] Anxiety  [ ] Delirium [ ] Agitation [ ] Other  HEENT:  [ x]Normal   [ ]Dry mouth   [ ]ET Tube/Trach  [ ]Oral lesions  PULMONARY:   [ ]Clear [ ]Tachypnea  [ ]Audible excessive secretions   [ ]Rhonchi        [ ]Right [ ]Left [ ]Bilateral  [x ]Crackles        [ ]Right [ ]Left [ x]Bilateral  [ ]Wheezing     [ ]Right [ ]Left [ ]Bilateral  [ x]Diminished breath sounds [ ]right [ ]left [x ]bilateral  [x] labored breathing   CARDIOVASCULAR:    [x ]Regular [ ]Irregular [ ]Tachy  [ ]Ramesh [ ]Murmur [ ]Other  GASTROINTESTINAL:  [x ]Soft  [ ]Distended   [ x]+BS  [x ]Non tender [ ]Tender  [ ]PEG [ ]OGT/ NGT  Last BM: 4/18    GENITOURINARY:  [ ]Normal [ x] Incontinent   [ ]Oliguria/Anuria   [ ]Oscar  MUSCULOSKELETAL:   [ ]Normal   [ x]Weakness  [ ]Bed/Wheelchair bound [ ]Edema  NEUROLOGIC:   [ ]No focal deficits  [ ]Cognitive impairment  [ ]Dysphagia [ ]Dysarthria [ ]Paresis [x ]Other: Lethargic   SKIN:   [x ]Normal    [ ]Rash  [ ]Pressure ulcer(s)       Present on admission [ ]y [ ]n    CRITICAL CARE:  [ ] Shock Present  [ ]Septic [ ]Cardiogenic [ ]Neurologic [ ]Hypovolemic  [ ]  Vasopressors [ ]  Inotropes   [ ]Respiratory failure present [ ]Mechanical ventilation [ ]Non-invasive ventilatory support [ ]High flow    [ ]Acute  [ ]Chronic [ ]Hypoxic  [ ]Hypercarbic [ ]Other  [ ]Other organ failure     LABS:                                        8.5    10.13 )-----------( 351      ( 21 Apr 2021 06:36 )             29.0   04-21    138  |  103  |  61<H>  ----------------------------<  142<H>  3.8   |  23  |  1.80<H>    Ca    9.2      21 Apr 2021 06:34  Phos  3.2     04-21  Mg     2.1     04-21          RADIOLOGY & ADDITIONAL STUDIES:  < from: Xray Chest 1 View- PORTABLE-Urgent (Xray Chest 1 View- PORTABLE-Urgent .) (04.17.21 @ 15:41) >  IMPRESSION: Unchanged right pleural effusion and pulmonary edema.                LUANA SOL MD; Attending Radiologist  This document has been electronically signed. Apr 18 2021 10:21AM    < end of copied text >    cho< from: TTE with Doppler (w/Cont) (04.19.21 @ 06:38) >    Patient name: SELVIN CLAIRE  YOB: 1945   Age: 75 (F)   MR#: [id number]  Study Date: 4/19/2021  Location: [address] [place], [place] [id number]: Nilam Shannon RDCSEF (Visual Estimate): 25-30 %Severe  global left ventricular systolic dysfunction.5. Right ventricular enlargement with decreased right  ventricular systolic function.moderate pulmonary pressures.    < end of copied text >    PROTEIN CALORIE MALNUTRITION PRESENT: [ ]mild [ ]moderate [ ]severe [ ]underweight [ ]morbid obesity  https://www.andeal.org/vault/2440/web/files/ONC/Table_Clinical%20Characteristics%20to%20Document%20Malnutrition-White%20JV%20et%20al%202012.pdf    Height (cm): 149.9 (04-16-21 @ 22:31), 149.9 (01-13-21 @ 01:15)  Weight (kg): 53 (01-25-21 @ 13:11)  BMI (kg/m2): 23.6 (04-16-21 @ 22:31), 23.6 (01-25-21 @ 13:11)    [ ]PPSV2 < or = to 30% [ ]significant weight loss  [ ]poor nutritional intake  [ ]anasarca     Albumin, Serum: 3.4 g/dL (04-19-21 @ 01:13)   [ ]Artificial Nutrition      REFERRALS:   [ ]Chaplaincy  [ ]Hospice  [ ]Child Life  [ ]Social Work  [ ]Case management [ ]Holistic Therapy     Goals of Care Document:

## 2021-04-21 NOTE — DISCHARGE NOTE PROVIDER - PROVIDER TOKENS
PROVIDER:[TOKEN:[[id number]]] FREE:[LAST:[[facility]],PHONE:[phone]],FAX:[phone]],ADDRESS:[[address] [place] [address]]]

## 2021-04-21 NOTE — PROGRESS NOTE ADULT - PROBLEM SELECTOR PLAN 4
home regimen is Lantus to 45u at bedtime/25u AM  - Lantus and premeal d/c'ed 2/2 hypoglycemia while NPO;   - Will resume basal coverage when tolerating PO  - Patient counseled for compliance with consistent low carb diet and exercise as tolerated outpatient. home regimen is Lantus to 45u at bedtime/25u AM  - Lantus and premeal d/c'ed 2/2 hypoglycemia while NPO;   - c/w Lantus 17 u and 7 u premeal  - Patient counseled for compliance with consistent low carb diet and exercise as tolerated outpatient.

## 2021-04-21 NOTE — DISCHARGE NOTE PROVIDER - HOSPITAL COURSE
76 yo F PMH HTN, HLD, DM2, CAD s/p CABG, ICM HFrEF (EF 20-25%, LVEDD 4.7 cm), severe mitral regurgitation s/p mitral clip, severe pulmonary HTN, CKD IV, hypothyroidism presented with sob. Pt was recently discharged from [facility]. She was at home with family and since then has been having worsening sob and chest pain. Had recent admission earlier this year for hypoxic respiratory failure secondary to HF exacerbation requiring milrinone assisted diuresis. Discharged home on 2L NC. In ED noted to be tachypneic in ED so started on NRB. EKG was unchanged from prior. Started on Bumex 2 mg IV BID, home regimen was Bumex 2 mg in AM, 1 mg PM. Also started on aldactone. Cards was consulted. TTE showed EF 25-30%, decreased RV function, mod-severe TR, mod pulm HTN. Required metolazone to augment diuresis. Dyspnea resolved, component of anxiety.   Palliative was consulted, pt made DNR/DNI. Started pt on lexapro for anxiety.     Remained stable on home 2L. Pt recommended home PT however family opted for ___. 74 yo F PMH HTN, HLD, DM2, CAD s/p CABG, ICM HFrEF (EF 20-25%, LVEDD 4.7 cm), severe mitral regurgitation s/p mitral clip, severe pulmonary HTN, CKD IV, hypothyroidism presented with sob. Pt was recently discharged from [facility]. She was at home with family and since then has been having worsening sob and chest pain. Had recent admission earlier this year for hypoxic respiratory failure secondary to HF exacerbation requiring milrinone assisted diuresis. Discharged home on 2L NC. In ED noted to be tachypneic in ED so started on NRB. EKG was unchanged from prior. Started on Bumex 2 mg IV BID, home regimen was Bumex 2 mg in AM, 1 mg PM. Also started on aldactone. Cards was consulted. TTE showed EF 25-30%, decreased RV function, mod-severe TR, mod pulm HTN. Required metolazone to augment diuresis. Dyspnea resolved, component of anxiety.   Palliative was consulted, pt made DNR/DNI. Started pt on lexapro for anxiety.     For symptomatic management and transition to end of life care, pt was transferred to PCU. Started on ativan and dilaudid for symptomatic control of anxiety and dyspnea. Son in law and daughter agreed to transition the patient to nursing care facility with hospice.

## 2021-04-21 NOTE — PROGRESS NOTE ADULT - SUBJECTIVE AND OBJECTIVE BOX
S: No distress, no reported SOB however on 3.5-4L NC. Denies chest pain. Review of systems otherwise (-)    Review of Systems:   Constitutional: [ ] fevers, [ ] chills.   Skin: [ ] dry skin. [ ] rashes.  Psychiatric: [ ] depression, [ ] anxiety.   Gastrointestinal: [ ] BRBPR, [ ] melena.   Neurological: [ ] confusion. [ ] seizures. [ ] shuffling gait.   Ears,Nose,Mouth and Throat: [ ] ear pain [ ] sore throat.   Eyes: [ ] diplopia.   Respiratory: [ ] hemoptysis. [ ] shortness of breath  Cardiovascular: See HPI above  Hematologic/Lymphatic: [ ] anemia. [ ] painful nodes. [ ] prolonged bleeding.   Genitourinary: [ ] hematuria. [ ] flank pain.   Endocrine: [ ] significant change in weight. [ ] intolerance to heat and cold.     Review of systems [x ] otherwise negative, [ ] otherwise unable to obtain    FH: no family history of sudden cardiac death in first degree relatives    SH: [ ] tobacco, [ ] alcohol, [ ] drugs      MEDICATIONS  (STANDING):  aspirin enteric coated 81 milliGRAM(s) Oral daily  atorvastatin 80 milliGRAM(s) Oral at bedtime  buMETAnide Injectable 2 milliGRAM(s) IV Push two times a day  clopidogrel Tablet 75 milliGRAM(s) Oral daily  dextrose 40% Gel 15 Gram(s) Oral once  dextrose 5%. 1000 milliLiter(s) (50 mL/Hr) IV Continuous <Continuous>  dextrose 5%. 1000 milliLiter(s) (100 mL/Hr) IV Continuous <Continuous>  dextrose 5%. 1000 milliLiter(s) (50 mL/Hr) IV Continuous <Continuous>  dextrose 50% Injectable 25 Gram(s) IV Push once  dextrose 50% Injectable 12.5 Gram(s) IV Push once  dextrose 50% Injectable 25 Gram(s) IV Push once  escitalopram 5 milliGRAM(s) Oral daily  glucagon  Injectable 1 milliGRAM(s) IntraMuscular once  heparin   Injectable 5000 Unit(s) SubCutaneous every 8 hours  hydrALAZINE 37.5 milliGRAM(s) Oral three times a day  insulin glargine Injectable (LANTUS) 16 Unit(s) SubCutaneous at bedtime  insulin lispro (ADMELOG) corrective regimen sliding scale   SubCutaneous three times a day before meals  insulin lispro (ADMELOG) corrective regimen sliding scale   SubCutaneous at bedtime  insulin lispro Injectable (ADMELOG) 7 Unit(s) SubCutaneous three times a day before meals  levothyroxine 50 MICROGram(s) Oral daily  melatonin 3 milliGRAM(s) Oral at bedtime  metoprolol succinate  milliGRAM(s) Oral daily  pantoprazole    Tablet 40 milliGRAM(s) Oral before breakfast  senna 2 Tablet(s) Oral at bedtime  sodium bicarbonate 650 milliGRAM(s) Oral daily  spironolactone 12.5 milliGRAM(s) Oral daily    MEDICATIONS  (PRN):  polyethylene glycol 3350 17 Gram(s) Oral two times a day PRN Constipation      LABS:                          8.5    10.13 )-----------( 351      ( 21 Apr 2021 06:36 )             29.0     Hemoglobin: 8.5 g/dL (04-21 @ 06:36)  Hemoglobin: 8.4 g/dL (04-20 @ 07:18)  Hemoglobin: 8.8 g/dL (04-19 @ 01:01)  Hemoglobin: 8.7 g/dL (04-18 @ 11:35)  Hemoglobin: 9.0 g/dL (04-17 @ 07:22)    04-21    138  |  103  |  61<H>  ----------------------------<  142<H>  3.8   |  23  |  1.80<H>    Ca    9.2      21 Apr 2021 06:34  Phos  3.2     04-21  Mg     2.1     04-21      Creatinine Trend: 1.80<--, 1.69<--, 1.71<--, 1.58<--, 1.58<--, 1.50<--             04-20-21 @ 07:01  -  04-21-21 @ 07:00  --------------------------------------------------------  IN: 780 mL / OUT: 1600 mL / NET: -820 mL        PHYSICAL EXAM  Vital Signs Last 24 Hrs  T(C): 36.7 (21 Apr 2021 06:37), Max: 36.7 (21 Apr 2021 06:37)  T(F): 98 (21 Apr 2021 06:37), Max: 98 (21 Apr 2021 06:37)  HR: 76 (21 Apr 2021 06:37) (76 - 85)  BP: 111/50 (21 Apr 2021 06:37) (103/68 - 120/60)  BP(mean): --  RR: 18 (21 Apr 2021 06:37) (18 - 18)  SpO2: 95% (21 Apr 2021 06:37) (95% - 98%)      General: Well nourished in no acute distress. Alert and Oriented * 3.   Head: Normocephalic and atraumatic.   Neck: No JVD. No bruits. Supple. Does not appear to be enlarged.   Cardiovascular: + S1,S2 ; RRR Soft systolic murmur at the left lower sternal border. No rubs noted.    Lungs: CTA b/l. No rhonchi, rales or wheezes.   Abdomen: + BS, soft. Non tender. Non distended. No rebound. No guarding.   Extremities: No clubbing/cyanosis/edema.   Neurologic: Moves all four extremities. Full range of motion.   Skin: Warm and moist. The patient's skin has normal elasticity and good skin turgor.   Psychiatric: Appropriate mood and affect.  Musculoskeletal: Normal range of motion, normal strength    TELEMETRY: SR 70-80s    < from: TTE with Doppler (w/Cont) (04.19.21 @ 06:38) >  Conclusions:  1. Mitral clip seen. Mild mitral regurgitation.  Mean  transmitral valve gradient equals 5-6 mm Hg, which is  probably normal/milldy elevated  in the setting of a  Mitracip. HR 90s  2. Minimal aortic regurgitation.  3.  Mild left ventricular enlargement.  4. Endocardial visualization enhanced with intravenous  injection of Ultrasonic Enhancing Agent(Definity).  Severe  global left ventricular systolic dysfunction.  5. Right ventricular enlargement with decreased right  ventricular systolic function.  6. Normal tricuspid valve. Moderate-severe tricuspid  regurgitation.  7. Estimated pulmonary artery systolic pressure equals 57  mm Hg, assuming right atrial pressure equals 8 mm Hg,  consistent with moderate pulmonary pressures.    < end of copied text >      ASSESSMENT/PLAN: 	75y.o F [place] speaking h/o HTN, HLD, DM, CAD s/p CABG 2014 and prior PCI, severe mitral regurgitation (s/p mitral clip 2019), pulmonary HTN (TTE 2019), HF (EF 21 % June 2019), CKD IV not on dialysis (b/l SCr 2-2.2), hypothyroidism admitted for tachypnea 2/2 acute on chronic HFrEF exacerbation.     - Keep net negative with IV bumex/aldactone - strict I/Os  - Wean O2 as tolerated - d/w RN weaned to 2.5L NC - f/u O2 sats to see if improving  - Continue hydralazine/Toprol XL  - dapt/statin for prior stents   - Monitor creatine/lytes  - Anxiety treatment prn per med  - TTE noted - EF 25-30%, decreased RV function, mod-severe TR, mod pulm HTN  - Palliative follow up noted    Amauri Hill PA-C  Pager: [phone]

## 2021-04-21 NOTE — PROGRESS NOTE ADULT - PROBLEM SELECTOR PLAN 7
Will continue to f/u for GOC and resources.   Will also aid with symptoms if needed.         Pepe Stevens MD   Geriatrics and Palliative Care (GAP) Consult Service    of Geriatric and Palliative Medicine  [facility]      Please page the following number for clinical matters between the hours of 9 am and 5 pm from Monday through Friday : [phone]    After 5pm and on weekends, please see the contact information below:    In the event of newly developing, evolving, or worsening symptoms, please contact the Palliative Medicine team via pager (if the patient is at [facility] #[phone] or if the patient is at [facility] #[phone]) The Geriatric and Palliative Medicine service has coverage 24 hours a day/ 7 days a week to provide medical recommendations regarding symptom management needs via telephone

## 2021-04-21 NOTE — DISCHARGE NOTE PROVIDER - NSDCCPTREATMENT_GEN_ALL_CORE_FT
PRINCIPAL PROCEDURE  Procedure: Transthoracic echo  Findings and Treatment: Conclusions:  1. Mitral clip seen. Mild mitral regurgitation.  Mean  transmitral valve gradient equals 5-6 mm Hg, which is  probably normal/milldy elevated  in the setting of a  Mitracip. HR 90s  2. Minimal aortic regurgitation.  3.  Mild left ventricular enlargement.  4. Endocardial visualization enhanced with intravenous  injection of Ultrasonic Enhancing Agent(Definity).  Severe  global left ventricular systolic dysfunction.  5. Right ventricular enlargement with decreased right  ventricular systolic function.  6. Normal tricuspid valve. Moderate-severe tricuspid  regurgitation.  7. Estimated pulmonary artery systolic pressure equals 57  mm Hg, assuming right atrial pressure equals 8 mm Hg,  consistent with moderate pulmonary pressures.< from: TTE with Doppler (w/Cont) (04.19.21 @ 06:38) >

## 2021-04-21 NOTE — PROGRESS NOTE ADULT - PROBLEM SELECTOR PLAN 3
Slightly better today.   The patient indicated her Anxiety was mild.   Continue Lexapro and off of Xanax, for now.

## 2021-04-21 NOTE — PROGRESS NOTE ADULT - SUBJECTIVE AND OBJECTIVE BOX
Adrianna iEdathy PGY1    Patient is a 75y old  Female who presents with a chief complaint of dyspnea (20 Apr 2021 15:08)      SUBJECTIVE / OVERNIGHT EVENTS:    MEDICATIONS  (STANDING):  aspirin enteric coated 81 milliGRAM(s) Oral daily  atorvastatin 80 milliGRAM(s) Oral at bedtime  buMETAnide Injectable 2 milliGRAM(s) IV Push two times a day  clopidogrel Tablet 75 milliGRAM(s) Oral daily  dextrose 40% Gel 15 Gram(s) Oral once  dextrose 5%. 1000 milliLiter(s) (50 mL/Hr) IV Continuous <Continuous>  dextrose 5%. 1000 milliLiter(s) (100 mL/Hr) IV Continuous <Continuous>  dextrose 5%. 1000 milliLiter(s) (50 mL/Hr) IV Continuous <Continuous>  dextrose 50% Injectable 25 Gram(s) IV Push once  dextrose 50% Injectable 12.5 Gram(s) IV Push once  dextrose 50% Injectable 25 Gram(s) IV Push once  escitalopram 5 milliGRAM(s) Oral daily  glucagon  Injectable 1 milliGRAM(s) IntraMuscular once  heparin   Injectable 5000 Unit(s) SubCutaneous every 8 hours  hydrALAZINE 37.5 milliGRAM(s) Oral three times a day  insulin glargine Injectable (LANTUS) 16 Unit(s) SubCutaneous at bedtime  insulin lispro (ADMELOG) corrective regimen sliding scale   SubCutaneous three times a day before meals  insulin lispro (ADMELOG) corrective regimen sliding scale   SubCutaneous at bedtime  insulin lispro Injectable (ADMELOG) 7 Unit(s) SubCutaneous three times a day before meals  levothyroxine 50 MICROGram(s) Oral daily  melatonin 3 milliGRAM(s) Oral at bedtime  metoprolol succinate  milliGRAM(s) Oral daily  pantoprazole    Tablet 40 milliGRAM(s) Oral before breakfast  senna 2 Tablet(s) Oral at bedtime  sodium bicarbonate 650 milliGRAM(s) Oral daily  spironolactone 12.5 milliGRAM(s) Oral daily    MEDICATIONS  (PRN):  polyethylene glycol 3350 17 Gram(s) Oral two times a day PRN Constipation      CAPILLARY BLOOD GLUCOSE      POCT Blood Glucose.: 266 mg/dL (21 Apr 2021 00:46)  POCT Blood Glucose.: 318 mg/dL (20 Apr 2021 23:28)  POCT Blood Glucose.: 397 mg/dL (20 Apr 2021 21:57)  POCT Blood Glucose.: 401 mg/dL (20 Apr 2021 21:55)  POCT Blood Glucose.: 391 mg/dL (20 Apr 2021 17:50)  POCT Blood Glucose.: 353 mg/dL (20 Apr 2021 13:42)  POCT Blood Glucose.: 187 mg/dL (20 Apr 2021 08:53)    I&O's Summary    20 Apr 2021 07:01  -  21 Apr 2021 07:00  --------------------------------------------------------  IN: 780 mL / OUT: 1600 mL / NET: -820 mL        Vital Signs Last 24 Hrs  T(C): 36.7 (21 Apr 2021 06:37), Max: 36.7 (21 Apr 2021 06:37)  T(F): 98 (21 Apr 2021 06:37), Max: 98 (21 Apr 2021 06:37)  HR: 76 (21 Apr 2021 06:37) (76 - 85)  BP: 111/50 (21 Apr 2021 06:37) (89/56 - 120/60)  BP(mean): --  RR: 18 (21 Apr 2021 06:37) (18 - 19)  SpO2: 95% (21 Apr 2021 06:37) (95% - 99%)    PHYSICAL EXAM:  RESPIRATORY: crackles bilateral basilar, no wheeze on 4L NC.  CARDIOVASCULAR: regular rate and rhythm, sinus on telemetry  ABDOMEN: Nontender to palpation, normoactive bowel sounds  MUSCULOSKELETAL: able to move all extremities  NEURO: Non-focal, no tremors    LABS:                        8.5    10.13 )-----------( 351      ( 21 Apr 2021 06:36 )             29.0      04-21    138  |  103  |  61<H>  ----------------------------<  142<H>  3.8   |  23  |  1.80<H>    Ca    9.2      21 Apr 2021 06:34  Phos  3.2     04-21  Mg     2.1     04-21                RADIOLOGY & ADDITIONAL TESTS:    Imaging Personally Reviewed:    Consultant(s) Notes Reviewed:      Care Discussed with Consultants/Other Providers:   Adrianna Eidathy PGY1    Patient is a 75y old  Female who presents with a chief complaint of dyspnea (20 Apr 2021 15:08)      SUBJECTIVE / OVERNIGHT EVENTS:\  - overnight - no events  - am - sleeping,     MEDICATIONS  (STANDING):  aspirin enteric coated 81 milliGRAM(s) Oral daily  atorvastatin 80 milliGRAM(s) Oral at bedtime  buMETAnide Injectable 2 milliGRAM(s) IV Push two times a day  clopidogrel Tablet 75 milliGRAM(s) Oral daily  dextrose 40% Gel 15 Gram(s) Oral once  dextrose 5%. 1000 milliLiter(s) (50 mL/Hr) IV Continuous <Continuous>  dextrose 5%. 1000 milliLiter(s) (100 mL/Hr) IV Continuous <Continuous>  dextrose 5%. 1000 milliLiter(s) (50 mL/Hr) IV Continuous <Continuous>  dextrose 50% Injectable 25 Gram(s) IV Push once  dextrose 50% Injectable 12.5 Gram(s) IV Push once  dextrose 50% Injectable 25 Gram(s) IV Push once  escitalopram 5 milliGRAM(s) Oral daily  glucagon  Injectable 1 milliGRAM(s) IntraMuscular once  heparin   Injectable 5000 Unit(s) SubCutaneous every 8 hours  hydrALAZINE 37.5 milliGRAM(s) Oral three times a day  insulin glargine Injectable (LANTUS) 16 Unit(s) SubCutaneous at bedtime  insulin lispro (ADMELOG) corrective regimen sliding scale   SubCutaneous three times a day before meals  insulin lispro (ADMELOG) corrective regimen sliding scale   SubCutaneous at bedtime  insulin lispro Injectable (ADMELOG) 7 Unit(s) SubCutaneous three times a day before meals  levothyroxine 50 MICROGram(s) Oral daily  melatonin 3 milliGRAM(s) Oral at bedtime  metoprolol succinate  milliGRAM(s) Oral daily  pantoprazole    Tablet 40 milliGRAM(s) Oral before breakfast  senna 2 Tablet(s) Oral at bedtime  sodium bicarbonate 650 milliGRAM(s) Oral daily  spironolactone 12.5 milliGRAM(s) Oral daily    MEDICATIONS  (PRN):  polyethylene glycol 3350 17 Gram(s) Oral two times a day PRN Constipation      CAPILLARY BLOOD GLUCOSE      POCT Blood Glucose.: 266 mg/dL (21 Apr 2021 00:46)  POCT Blood Glucose.: 318 mg/dL (20 Apr 2021 23:28)  POCT Blood Glucose.: 397 mg/dL (20 Apr 2021 21:57)  POCT Blood Glucose.: 401 mg/dL (20 Apr 2021 21:55)  POCT Blood Glucose.: 391 mg/dL (20 Apr 2021 17:50)  POCT Blood Glucose.: 353 mg/dL (20 Apr 2021 13:42)  POCT Blood Glucose.: 187 mg/dL (20 Apr 2021 08:53)    I&O's Summary    20 Apr 2021 07:01  -  21 Apr 2021 07:00  --------------------------------------------------------  IN: 780 mL / OUT: 1600 mL / NET: -820 mL        Vital Signs Last 24 Hrs  T(C): 36.7 (21 Apr 2021 06:37), Max: 36.7 (21 Apr 2021 06:37)  T(F): 98 (21 Apr 2021 06:37), Max: 98 (21 Apr 2021 06:37)  HR: 76 (21 Apr 2021 06:37) (76 - 85)  BP: 111/50 (21 Apr 2021 06:37) (89/56 - 120/60)  BP(mean): --  RR: 18 (21 Apr 2021 06:37) (18 - 19)  SpO2: 95% (21 Apr 2021 06:37) (95% - 99%)    PHYSICAL EXAM:  RESPIRATORY: crackles bilateral basilar, no wheeze on 4L NC.  CARDIOVASCULAR: regular rate and rhythm, sinus on telemetry  ABDOMEN: Nontender to palpation, normoactive bowel sounds  MUSCULOSKELETAL: able to move all extremities  NEURO: Non-focal, no tremors    LABS:                        8.5    10.13 )-----------( 351      ( 21 Apr 2021 06:36 )             29.0      04-21    138  |  103  |  61<H>  ----------------------------<  142<H>  3.8   |  23  |  1.80<H>    Ca    9.2      21 Apr 2021 06:34  Phos  3.2     04-21  Mg     2.1     04-21                RADIOLOGY & ADDITIONAL TESTS:    Imaging Personally Reviewed:    Consultant(s) Notes Reviewed:      Care Discussed with Consultants/Other Providers:   Adrianna Eidathy PGY1    Patient is a 75y old  Female who presents with a chief complaint of dyspnea (20 Apr 2021 15:08)      SUBJECTIVE / OVERNIGHT EVENTS:\  - overnight - no events  - am - pt is mildly dyspneic, no chest pain    MEDICATIONS  (STANDING):  aspirin enteric coated 81 milliGRAM(s) Oral daily  atorvastatin 80 milliGRAM(s) Oral at bedtime  buMETAnide Injectable 2 milliGRAM(s) IV Push two times a day  clopidogrel Tablet 75 milliGRAM(s) Oral daily  dextrose 40% Gel 15 Gram(s) Oral once  dextrose 5%. 1000 milliLiter(s) (50 mL/Hr) IV Continuous <Continuous>  dextrose 5%. 1000 milliLiter(s) (100 mL/Hr) IV Continuous <Continuous>  dextrose 5%. 1000 milliLiter(s) (50 mL/Hr) IV Continuous <Continuous>  dextrose 50% Injectable 25 Gram(s) IV Push once  dextrose 50% Injectable 12.5 Gram(s) IV Push once  dextrose 50% Injectable 25 Gram(s) IV Push once  escitalopram 5 milliGRAM(s) Oral daily  glucagon  Injectable 1 milliGRAM(s) IntraMuscular once  heparin   Injectable 5000 Unit(s) SubCutaneous every 8 hours  hydrALAZINE 37.5 milliGRAM(s) Oral three times a day  insulin glargine Injectable (LANTUS) 16 Unit(s) SubCutaneous at bedtime  insulin lispro (ADMELOG) corrective regimen sliding scale   SubCutaneous three times a day before meals  insulin lispro (ADMELOG) corrective regimen sliding scale   SubCutaneous at bedtime  insulin lispro Injectable (ADMELOG) 7 Unit(s) SubCutaneous three times a day before meals  levothyroxine 50 MICROGram(s) Oral daily  melatonin 3 milliGRAM(s) Oral at bedtime  metoprolol succinate  milliGRAM(s) Oral daily  pantoprazole    Tablet 40 milliGRAM(s) Oral before breakfast  senna 2 Tablet(s) Oral at bedtime  sodium bicarbonate 650 milliGRAM(s) Oral daily  spironolactone 12.5 milliGRAM(s) Oral daily    MEDICATIONS  (PRN):  polyethylene glycol 3350 17 Gram(s) Oral two times a day PRN Constipation      CAPILLARY BLOOD GLUCOSE      POCT Blood Glucose.: 266 mg/dL (21 Apr 2021 00:46)  POCT Blood Glucose.: 318 mg/dL (20 Apr 2021 23:28)  POCT Blood Glucose.: 397 mg/dL (20 Apr 2021 21:57)  POCT Blood Glucose.: 401 mg/dL (20 Apr 2021 21:55)  POCT Blood Glucose.: 391 mg/dL (20 Apr 2021 17:50)  POCT Blood Glucose.: 353 mg/dL (20 Apr 2021 13:42)  POCT Blood Glucose.: 187 mg/dL (20 Apr 2021 08:53)    I&O's Summary    20 Apr 2021 07:01  -  21 Apr 2021 07:00  --------------------------------------------------------  IN: 780 mL / OUT: 1600 mL / NET: -820 mL        Vital Signs Last 24 Hrs  T(C): 36.7 (21 Apr 2021 06:37), Max: 36.7 (21 Apr 2021 06:37)  T(F): 98 (21 Apr 2021 06:37), Max: 98 (21 Apr 2021 06:37)  HR: 76 (21 Apr 2021 06:37) (76 - 85)  BP: 111/50 (21 Apr 2021 06:37) (89/56 - 120/60)  BP(mean): --  RR: 18 (21 Apr 2021 06:37) (18 - 19)  SpO2: 95% (21 Apr 2021 06:37) (95% - 99%)    PHYSICAL EXAM:  RESPIRATORY: crackles bilateral basilar, no wheeze on 3.5L NC  CARDIOVASCULAR: regular rate and rhythm, sinus on telemetry  ABDOMEN: Nontender to palpation, normoactive bowel sounds  MUSCULOSKELETAL: able to move all extremities  NEURO: Non-focal, no tremors    LABS:                        8.5    10.13 )-----------( 351      ( 21 Apr 2021 06:36 )             29.0      04-21    138  |  103  |  61<H>  ----------------------------<  142<H>  3.8   |  23  |  1.80<H>    Ca    9.2      21 Apr 2021 06:34  Phos  3.2     04-21  Mg     2.1     04-21                RADIOLOGY & ADDITIONAL TESTS:    Imaging Personally Reviewed:    Consultant(s) Notes Reviewed:      Care Discussed with Consultants/Other Providers:

## 2021-04-21 NOTE — PROGRESS NOTE ADULT - ASSESSMENT
75y.o F Fris[place] speaking h/o HTN, HLD, DM, CAD s/p CABG 2014 and prior PCI, severe mitral regurgitation (s/p mitral clip 2019), pulmonary HTN (TTE 2019), HF (EF 21 % June 2019), CKD IV not on dialysis (b/l SCr 2-2.2), hypothyroidism comes to the Ed with 7 days of SOB. Per chart review, patient recently admitted for hypoxic respiratory failure 2/2 acute on chronic HFrEF exacerbation in the setting of mitral stenosis c/b MERVIN on CKD.  Pt initially treated w/ IV diuresis but was started on milrinone drip after RHC showed persistent elevated filled pressures. She received IV diuresis, milrinone continued dyspnea despite clinical euvolemic status.  PRN xanax started for possible anxiety component.  Labs w/ intermittent elevations in WBC in the setting of asymptomatic bacteruria    CKD stage 4  - baseline Cr 1.9-2.2; has had recurrent MERVIN's over the years  - CKD is likely 2/2 recurrent MERVIN's + underlying DM/HTN  - Renal function slightly elevated. Note hypotension yesterday   - Continue Diuretics per cardiology. on IV bumex s/p metolazone 2.5 4/20 w/ 1.6L urine output overnight   - Avoid hypotension   - Avoid nephrotoxins including NSAIDs, IV contrast (if possible), [employer] products  - monitor I/O's accurately    Acidosis  serum bicarb at goal for CKD   Continue sodium bicarb daily  MOnitor serum CO2    HTN  BP controlled  avoid hypotension  Low salt diet   MOnitor BP    SOB  likely sec to CHF  Follow up Cardiology  Monitor daily weights     Hypokalemia  sec to diuretics  improved   MOnitor serm K

## 2021-04-21 NOTE — DISCHARGE NOTE PROVIDER - NSDCMRMEDTOKEN_GEN_ALL_CORE_FT
aspirin 81 mg oral delayed release tablet: 1 tab(s) orally once a day  atorvastatin 80 mg oral tablet: 1 tab(s) orally once a day (at bedtime)  bumetanide 1 mg oral tablet: 1 tab(s) orally once a day at 6PM  bumetanide 2 mg oral tablet: 1 tab(s) orally once a day at 6AM  clopidogrel 75 mg oral tablet: 1 tab(s) orally once a day  hydrALAZINE: 37.5 milligram(s) orally every 8 hours  insulin glargine: 25 unit(s) subcutaneous once a day in the morning  insulin glargine: 45 unit(s) subcutaneous once a day (at bedtime)  insulin lispro 100 units/mL injectable solution: 16 unit(s) subcutaneous 3 times a day (before meals)  levothyroxine 50 mcg (0.05 mg) oral tablet: 1 tab(s) orally once a day  melatonin 3 mg oral tablet: 1 tab(s) orally once a day (at bedtime)  metoprolol succinate 100 mg oral tablet, extended release: 1 tab(s) orally every 12 hours  polyethylene glycol 3350 oral powder for reconstitution: 17 gram(s) orally 2 times a day, As needed, Constipation  senna oral tablet: 2 tab(s) orally once a day (at bedtime)  sodium bicarbonate 650 mg oral tablet: 1 tab(s) orally once a day   aspirin 81 mg oral delayed release tablet: 1 tab(s) orally once a day  bumetanide 1 mg oral tablet: 1 tab(s) orally once a day at 6PM  bumetanide 2 mg oral tablet: 1 tab(s) orally once a day at 6AM  clopidogrel 75 mg oral tablet: 1 tab(s) orally once a day  escitalopram 5 mg oral tablet: 1 tab(s) orally once a day  hydrALAZINE: 37.5 milligram(s) orally every 8 hours  insulin glargine: 25 unit(s) subcutaneous once a day in the morning  insulin glargine: 17 unit(s) subcutaneous once a day (at bedtime)  insulin glargine: 45 unit(s) subcutaneous once a day (at bedtime)  insulin lispro 100 units/mL injectable solution: 16 unit(s) subcutaneous 3 times a day (before meals)  levothyroxine 50 mcg (0.05 mg) oral tablet: 1 tab(s) orally once a day  melatonin 3 mg oral tablet: 1 tab(s) orally once a day (at bedtime)  metoprolol succinate 100 mg oral tablet, extended release: 1 tab(s) orally every 12 hours  polyethylene glycol 3350 oral powder for reconstitution: 17 gram(s) orally 2 times a day, As needed, Constipation  senna oral tablet: 2 tab(s) orally once a day (at bedtime)  sodium bicarbonate 650 mg oral tablet: 1 tab(s) orally once a day   aspirin 81 mg oral delayed release tablet: 1 tab(s) orally once a day  bisacodyl 10 mg rectal suppository: 1 suppository(ies) rectal once a day, As needed, Constipation  bumetanide 2 mg oral tablet: 1 tab(s) orally 2 times a day  clopidogrel 75 mg oral tablet: 1 tab(s) orally once a day  escitalopram 5 mg oral tablet: 1 tab(s) orally once a day  hydrALAZINE: 37.5 milligram(s) orally every 8 hours  HYDROmorphone: 0.5 milligram(s) intravenous every 6 hours  HYDROmorphone: 0.5 milligram(s) intravenous every hour, As Needed pain or dyspnea  insulin glargine: 17 unit(s) subcutaneous once a day (at bedtime)  lactulose 10 g/15 mL oral syrup: 15 milliliter(s) orally once a day, As needed, Constipation  levothyroxine 50 mcg (0.05 mg) oral tablet: 1 tab(s) orally once a day  LORazepam: 0.5 milligram(s) intravenous every 8 hours  LORazepam: 0.5 milligram(s) intravenous every hour, As Needed anxiety  metoprolol succinate 100 mg oral tablet, extended release: 1 tab(s) orally once a day  sodium bicarbonate 650 mg oral tablet: 1 tab(s) orally once a day  spironolactone 25 mg oral tablet: 0.5 tab(s) orally once a day

## 2021-04-21 NOTE — PROGRESS NOTE ADULT - ASSESSMENT
75y.o F Ma[place] speaking h/o DM, CAD s/p CABG 2014 and prior PCI, severe mitral regurgitation (s/p mitral clip 2019), pulmonary HTN (TTE 2019), HF (EF 21 % June 2019), CKD IV not on dialysis (b/l SCr 2-2.2), admitted for tachypnea 2/2 acute on chronic HFrEF exacerbation. Currently on IV Bumex. Palliative care was called for GOC and ACP.

## 2021-04-21 NOTE — PROGRESS NOTE ADULT - ASSESSMENT
75y.o F Nepali speaking h/o DM, CAD s/p CABG 2014 and prior PCI, severe mitral regurgitation (s/p mitral clip 2019), pulmonary HTN (TTE 2019), HF (EF 21 % June 2019), CKD IV not on dialysis (b/l SCr 2-2.2), admitted for CHF exacerbation, on IV bumex  admitted for tachypnea 2/2 acute on chronic HFrEF exacerbation.

## 2021-04-21 NOTE — DISCHARGE NOTE PROVIDER - CARE PROVIDER_API CALL
Rob Connor)  Cardiovascular Disease; Internal Medicine  [address] [address]  [place] [address]  Phone: [phone]  Fax: [phone]  Follow Up Time:    [facility],   [address] [place] [address]  Phone: [phone]  Fax: [phone]  Follow Up Time:

## 2021-04-21 NOTE — PROGRESS NOTE ADULT - SUBJECTIVE AND OBJECTIVE BOX
[facility] [place] [facility]   MD Dion Dasilva MD, D.O. Ruoru Wong, [place]    From 7 AM - 5 PM:  OFFICE: [phone]  Dr. Muhammad cell: [phone]  Dr. Montero cell: [phone]  Dr. Soria cell: [phone]  RASHIDA Smith cell: [phone]    From 5 PM - 7 AM: Answering Service: [phone]  Date of service: 04-21-21 @ 10:45    RENAL FOLLOW UP NOTE  --------------------------------------------------------------------------------  HPI:  Pt seen and examined at bedside.   Denies SOB, chest pain     PAST HISTORY  --------------------------------------------------------------------------------  No significant changes to PMH, PSH, FHx, SHx, unless otherwise noted    ALLERGIES & MEDICATIONS  --------------------------------------------------------------------------------  Allergies    azithromycin (Hives; Pruritus)    Intolerances      Standing Inpatient Medications  aspirin enteric coated 81 milliGRAM(s) Oral daily  atorvastatin 80 milliGRAM(s) Oral at bedtime  buMETAnide Injectable 2 milliGRAM(s) IV Push two times a day  clopidogrel Tablet 75 milliGRAM(s) Oral daily  dextrose 40% Gel 15 Gram(s) Oral once  dextrose 5%. 1000 milliLiter(s) IV Continuous <Continuous>  dextrose 5%. 1000 milliLiter(s) IV Continuous <Continuous>  dextrose 5%. 1000 milliLiter(s) IV Continuous <Continuous>  dextrose 50% Injectable 25 Gram(s) IV Push once  dextrose 50% Injectable 12.5 Gram(s) IV Push once  dextrose 50% Injectable 25 Gram(s) IV Push once  escitalopram 5 milliGRAM(s) Oral daily  glucagon  Injectable 1 milliGRAM(s) IntraMuscular once  heparin   Injectable 5000 Unit(s) SubCutaneous every 8 hours  hydrALAZINE 37.5 milliGRAM(s) Oral three times a day  insulin glargine Injectable (LANTUS) 16 Unit(s) SubCutaneous at bedtime  insulin lispro (ADMELOG) corrective regimen sliding scale   SubCutaneous three times a day before meals  insulin lispro (ADMELOG) corrective regimen sliding scale   SubCutaneous at bedtime  insulin lispro Injectable (ADMELOG) 7 Unit(s) SubCutaneous three times a day before meals  levothyroxine 50 MICROGram(s) Oral daily  melatonin 3 milliGRAM(s) Oral at bedtime  metoprolol succinate  milliGRAM(s) Oral daily  pantoprazole    Tablet 40 milliGRAM(s) Oral before breakfast  senna 2 Tablet(s) Oral at bedtime  sodium bicarbonate 650 milliGRAM(s) Oral daily  spironolactone 12.5 milliGRAM(s) Oral daily    PRN Inpatient Medications  polyethylene glycol 3350 17 Gram(s) Oral two times a day PRN      REVIEW OF SYSTEMS  --------------------------------------------------------------------------------  General: no fever  CVS: no chest pain  RESP: no sob, no cough  ABD: no abdominal pain  : no dysuria,  MSK: no edema     VITALS/PHYSICAL EXAM  --------------------------------------------------------------------------------  T(C): 36.7 (04-21-21 @ 06:37), Max: 36.7 (04-21-21 @ 06:37)  HR: 76 (04-21-21 @ 06:37) (76 - 85)  BP: 111/50 (04-21-21 @ 06:37) (89/56 - 120/60)  RR: 18 (04-21-21 @ 06:37) (18 - 19)  SpO2: 95% (04-21-21 @ 06:37) (95% - 99%)  Wt(kg): --        04-20-21 @ 07:01  -  04-21-21 @ 07:00  --------------------------------------------------------  IN: 780 mL / OUT: 1600 mL / NET: -820 mL      Physical Exam:  	Gen: NAD  	HEENT: MMM  	Pulm: CTA B/L  	CV: S1S2  	Abd: Soft, +BS  	Ext: No LE edema B/L                      Neuro: Awake   	Skin: Warm and Dry   	    LABS/STUDIES  --------------------------------------------------------------------------------              8.5    10.13 >-----------<  351      [04-21-21 @ 06:36]              29.0     138  |  103  |  61  ----------------------------<  142      [04-21-21 @ 06:34]  3.8   |  23  |  1.80        Ca     9.2     [04-21-21 @ 06:34]      Mg     2.1     [04-21-21 @ 06:34]      Phos  3.2     [04-21-21 @ 06:34]    Creatinine Trend:  SCr 1.80 [04-21 @ 06:34]  SCr 1.69 [04-20 @ 07:18]  SCr 1.71 [04-19 @ 19:33]  SCr 1.58 [04-19 @ 01:13]  SCr 1.58 [04-18 @ 21:56]    Ferritin 111      [01-13-21 @ 01:42]  HbA1c 7.5      [10-08-19 @ 14:30]  TSH 1.94      [04-17-21 @ 02:28]

## 2021-04-21 NOTE — PROGRESS NOTE ADULT - PROBLEM SELECTOR PLAN 2
HF Rx as above.   If goals were to change towards a symptoms driven approach, may then consider Dilaudid 0.2 mg IV q 4 PRN

## 2021-04-21 NOTE — PROGRESS NOTE ADULT - PROBLEM SELECTOR PLAN 1
- admitted for acute on chronic systolic HF exacerbation (tachypneic to 30s, on non-rebreather in ED, needing bipap on floors for resp distress, now on NC). CXR on admission with pulm edema, BNP 6000. Less likely ACS (trop 49-> 42)  - last HF admission 1/2021, requiring milrinone assisted diuresis.   - continue on 2mg IV bumex BID with net neg 2L over last 24h. continue  - continue home dose: hydral 37.5 TID, metoprolol succinate 100mg QD. started on spironolactone 12.5mg QD this admission.   - TTE 4/19: EF 25-30%, mild MR with clip, severe global LV dysfunction, decreased RV dysfunction, mod-severe TR, mod pulm pressures.   - Cr ~1.5 (baseline 2).  - daily weights, strict in/out  - HF following, f/u recs  - palliative consulted per family request for multiple HF exacerbation admissions - admitted for acute on chronic systolic HF exacerbation (tachypneic to 30s, on non-rebreather in ED, needing bipap on floors for resp distress, now on NC). CXR on admission with pulm edema, BNP 6000. Less likely ACS (trop 49-> 42)  - last HF admission 1/2021, requiring milrinone assisted diuresis.   - continue on 2mg IV bumex BID with net neg 820cc over last 24h. continue  - continue home dose: hydral 37.5 TID, metoprolol succinate 100mg QD. started on spironolactone 12.5mg QD this admission.   - TTE 4/19: EF 25-30%, mild MR with clip, severe global LV dysfunction, decreased RV dysfunction, mod-severe TR, mod pulm pressures.   - Cr ~1.5 (baseline 2).  - daily weights, strict in/out  - HF following, f/u recs  - palliative consulted per family request for multiple HF exacerbation admissions - admitted for acute on chronic systolic HF exacerbation (tachypneic to 30s, on non-rebreather in ED, needing bipap on floors for resp distress, now on NC). CXR on admission with pulm edema, BNP 6000. Less likely ACS (trop 49-> 42)  - last HF admission 1/2021, requiring milrinone assisted diuresis.   - continue on 2mg IV bumex BID with net neg 820cc over last 24h. continue  - continue home dose: hydral 37.5 TID, metoprolol succinate 100mg QD. started on spironolactone 12.5mg QD this admission.   - TTE 4/19: EF 25-30%, mild MR with clip, severe global LV dysfunction, decreased RV dysfunction, mod-severe TR, mod pulm pressures.   - Cr ~1.5 (baseline 2).  - daily weights, strict in/out  - HF following, f/u recs  - palliative consulted per family request for multiple HF exacerbation admissions. family is undecided regarding dispo, state they are overwhelemed with caring for her at home but do not want nursing home and wish to speak with palliative.

## 2021-04-22 DIAGNOSIS — F41.9 ANXIETY DISORDER, UNSPECIFIED: ICD-10-CM

## 2021-04-22 LAB
ANION GAP SERPL CALC-SCNC: 15 MMOL/L — SIGNIFICANT CHANGE UP (ref 5–17)
BUN SERPL-MCNC: 68 MG/DL — HIGH (ref 7–23)
CALCIUM SERPL-MCNC: 9 MG/DL — SIGNIFICANT CHANGE UP (ref 8.4–10.5)
CHLORIDE SERPL-SCNC: 102 MMOL/L — SIGNIFICANT CHANGE UP (ref 96–108)
CO2 SERPL-SCNC: 21 MMOL/L — LOW (ref 22–31)
CREAT SERPL-MCNC: 1.87 MG/DL — HIGH (ref 0.5–1.3)
GLUCOSE BLDC GLUCOMTR-MCNC: 149 MG/DL — HIGH (ref 70–99)
GLUCOSE BLDC GLUCOMTR-MCNC: 187 MG/DL — HIGH (ref 70–99)
GLUCOSE BLDC GLUCOMTR-MCNC: 198 MG/DL — HIGH (ref 70–99)
GLUCOSE BLDC GLUCOMTR-MCNC: 86 MG/DL — SIGNIFICANT CHANGE UP (ref 70–99)
GLUCOSE SERPL-MCNC: 165 MG/DL — HIGH (ref 70–99)
HCT VFR BLD CALC: 29.3 % — LOW (ref 34.5–45)
HGB BLD-MCNC: 8.4 G/DL — LOW (ref 11.5–15.5)
MAGNESIUM SERPL-MCNC: 2.2 MG/DL — SIGNIFICANT CHANGE UP (ref 1.6–2.6)
MCHC RBC-ENTMCNC: 22.5 PG — LOW (ref 27–34)
MCHC RBC-ENTMCNC: 28.7 GM/DL — LOW (ref 32–36)
MCV RBC AUTO: 78.3 FL — LOW (ref 80–100)
NRBC # BLD: 0 /100 WBCS — SIGNIFICANT CHANGE UP (ref 0–0)
PHOSPHATE SERPL-MCNC: 3.5 MG/DL — SIGNIFICANT CHANGE UP (ref 2.5–4.5)
PLATELET # BLD AUTO: 368 K/UL — SIGNIFICANT CHANGE UP (ref 150–400)
POTASSIUM SERPL-MCNC: 4.3 MMOL/L — SIGNIFICANT CHANGE UP (ref 3.5–5.3)
POTASSIUM SERPL-SCNC: 4.3 MMOL/L — SIGNIFICANT CHANGE UP (ref 3.5–5.3)
RBC # BLD: 3.74 M/UL — LOW (ref 3.8–5.2)
RBC # FLD: 19.9 % — HIGH (ref 10.3–14.5)
SODIUM SERPL-SCNC: 138 MMOL/L — SIGNIFICANT CHANGE UP (ref 135–145)
WBC # BLD: 10.24 K/UL — SIGNIFICANT CHANGE UP (ref 3.8–10.5)
WBC # FLD AUTO: 10.24 K/UL — SIGNIFICANT CHANGE UP (ref 3.8–10.5)

## 2021-04-22 PROCEDURE — 99233 SBSQ HOSP IP/OBS HIGH 50: CPT | Mod: GC

## 2021-04-22 PROCEDURE — 99233 SBSQ HOSP IP/OBS HIGH 50: CPT

## 2021-04-22 RX ORDER — HYDROMORPHONE HYDROCHLORIDE 2 MG/ML
0.2 INJECTION INTRAMUSCULAR; INTRAVENOUS; SUBCUTANEOUS
Refills: 0 | Status: DISCONTINUED | OUTPATIENT
Start: 2021-04-22 | End: 2021-04-29

## 2021-04-22 RX ORDER — ROBINUL 0.2 MG/ML
0.4 INJECTION INTRAMUSCULAR; INTRAVENOUS EVERY 6 HOURS
Refills: 0 | Status: DISCONTINUED | OUTPATIENT
Start: 2021-04-22 | End: 2021-05-04

## 2021-04-22 RX ADMIN — BUMETANIDE 2 MILLIGRAM(S): 0.25 INJECTION INTRAMUSCULAR; INTRAVENOUS at 17:31

## 2021-04-22 RX ADMIN — Medication 10 UNIT(S): at 13:29

## 2021-04-22 RX ADMIN — CLOPIDOGREL BISULFATE 75 MILLIGRAM(S): 75 TABLET, FILM COATED ORAL at 13:04

## 2021-04-22 RX ADMIN — SPIRONOLACTONE 12.5 MILLIGRAM(S): 25 TABLET, FILM COATED ORAL at 05:07

## 2021-04-22 RX ADMIN — Medication 3 MILLIGRAM(S): at 21:25

## 2021-04-22 RX ADMIN — Medication 37.5 MILLIGRAM(S): at 05:06

## 2021-04-22 RX ADMIN — INSULIN GLARGINE 20 UNIT(S): 100 INJECTION, SOLUTION SUBCUTANEOUS at 21:26

## 2021-04-22 RX ADMIN — ESCITALOPRAM OXALATE 5 MILLIGRAM(S): 10 TABLET, FILM COATED ORAL at 13:04

## 2021-04-22 RX ADMIN — BUMETANIDE 2 MILLIGRAM(S): 0.25 INJECTION INTRAMUSCULAR; INTRAVENOUS at 05:10

## 2021-04-22 RX ADMIN — PANTOPRAZOLE SODIUM 40 MILLIGRAM(S): 20 TABLET, DELAYED RELEASE ORAL at 05:11

## 2021-04-22 RX ADMIN — Medication 37.5 MILLIGRAM(S): at 13:25

## 2021-04-22 RX ADMIN — SENNA PLUS 2 TABLET(S): 8.6 TABLET ORAL at 21:26

## 2021-04-22 RX ADMIN — HEPARIN SODIUM 5000 UNIT(S): 5000 INJECTION INTRAVENOUS; SUBCUTANEOUS at 21:25

## 2021-04-22 RX ADMIN — Medication 1: at 13:28

## 2021-04-22 RX ADMIN — Medication 81 MILLIGRAM(S): at 13:04

## 2021-04-22 RX ADMIN — Medication 10 UNIT(S): at 09:38

## 2021-04-22 RX ADMIN — Medication 10 UNIT(S): at 17:57

## 2021-04-22 RX ADMIN — Medication 1: at 09:38

## 2021-04-22 RX ADMIN — Medication 50 MICROGRAM(S): at 05:07

## 2021-04-22 RX ADMIN — Medication 650 MILLIGRAM(S): at 13:04

## 2021-04-22 RX ADMIN — ATORVASTATIN CALCIUM 80 MILLIGRAM(S): 80 TABLET, FILM COATED ORAL at 21:25

## 2021-04-22 RX ADMIN — Medication 100 MILLIGRAM(S): at 05:07

## 2021-04-22 RX ADMIN — HEPARIN SODIUM 5000 UNIT(S): 5000 INJECTION INTRAVENOUS; SUBCUTANEOUS at 13:04

## 2021-04-22 RX ADMIN — HEPARIN SODIUM 5000 UNIT(S): 5000 INJECTION INTRAVENOUS; SUBCUTANEOUS at 05:08

## 2021-04-22 NOTE — PROGRESS NOTE ADULT - CONVERSATION DETAILS
The patient's family indicated that after further discussing, they agreed with GOC towards preventing and treating distressful symptoms. However, they would like to continue cardiac meds. Will add Dilaudid and Ativan PRN pain/dyspnea and Anxiety respectively. A inpatient hospice referral was done.   D/W Primary team.
d/w Cardio, Dr. Adame that agreed the patient is not a candidate for advanced therapies and that her illness will continue to progress with recurrent readmission and with an overall poor prognosis. He also agree on that hospice care is a reasonable option at this time.     I met with the patient's son in law and d/w him about the patient's current situation and poor prognosis. I indicated the patient was not going to be able to recover her functionality and that her symptoms were going to be recurrent and with frequent readmissions. He indicated that after talking with other family member (including doctors) and that as d/w his wife, it is likely they will decide for focusing on the patient's symptoms and inpatient hospice. However, he was going to f/u with his wife to further discuss about the patient's condition and plan of care moving forward. I will f/u with the HCPs tomorrow.     20' were spent in non face to face time during this encounter. Time in 15:45. Time out 16:05.

## 2021-04-22 NOTE — PROGRESS NOTE ADULT - ASSESSMENT
75y.o F English speaking h/o HTN, HLD, DM, CAD s/p CABG 2014 and prior PCI, severe mitral regurgitation (s/p mitral clip 2019), pulmonary HTN (TTE 2019), HF (EF 21 % June 2019), CKD IV not on dialysis (b/l SCr 2-2.2), hypothyroidism comes to the Ed with 7 days of SOB. Per chart review, patient recently admitted for hypoxic respiratory failure 2/2 acute on chronic HFrEF exacerbation in the setting of mitral stenosis c/b MERVIN on CKD.  Pt initially treated w/ IV diuresis but was started on milrinone drip after RHC showed persistent elevated filled pressures. She received IV diuresis, milrinone continued dyspnea despite clinical euvolemic status.  PRN xanax started for possible anxiety component.  Labs w/ intermittent elevations in WBC in the setting of asymptomatic bacteruria    CKD stage 4  - baseline Cr 1.9-2.2; has had recurrent MERVIN's over the years  - CKD is likely 2/2 recurrent MERVIN's + underlying DM/HTN  - Renal function remains within her baseline. Noted episodes of hypotension  - Continue Diuretics per cardiology. on IV bumex  - Avoid hypotension   - Avoid nephrotoxins including NSAIDs, IV contrast (if possible), [employer] products  - monitor I/O's accurately    Acidosis  serum bicarb at goal for CKD   Continue sodium bicarb daily  MOnitor serum CO2    HTN  BP controlled  avoid hypotension  Low salt diet   MOnitor BP    SOB  likely sec to CHF  Follow up Cardiology  Monitor daily weights     Hypokalemia  sec to diuretics  improved   MOnitor serm K

## 2021-04-22 NOTE — PROGRESS NOTE ADULT - PROBLEM SELECTOR PLAN 8
Planning for inpatient hospice.       Pepe Stevens MD   Geriatrics and Palliative Care (GAP) Consult Service    of Geriatric and Palliative Medicine  [facility]      Please page the following number for clinical matters between the hours of 9 am and 5 pm from Monday through Friday : [phone]    After 5pm and on weekends, please see the contact information below:    In the event of newly developing, evolving, or worsening symptoms, please contact the Palliative Medicine team via pager (if the patient is at [facility] #[phone] or if the patient is at [facility] #[phone]) The Geriatric and Palliative Medicine service has coverage 24 hours a day/ 7 days a week to provide medical recommendations regarding symptom management needs via telephone

## 2021-04-22 NOTE — PROGRESS NOTE ADULT - PROBLEM SELECTOR PLAN 6
DVT: HSQ  Diet: CC DASH  Dispo: pending symptomatic improvement  Family requested palliative consult. leaning toward inpatient hospice

## 2021-04-22 NOTE — PROGRESS NOTE ADULT - SUBJECTIVE AND OBJECTIVE BOX
PROGRESS NOTE:   Authored by Nikki Kimura, MD, Pager [phone] [facility], [id number] [facility]     Patient is a 75y old  Female who presents with a chief complaint of dyspnea (21 Apr 2021 18:05)      SUBJECTIVE / OVERNIGHT EVENTS:    ADDITIONAL REVIEW OF SYSTEMS:    MEDICATIONS  (STANDING):  aspirin enteric coated 81 milliGRAM(s) Oral daily  atorvastatin 80 milliGRAM(s) Oral at bedtime  buMETAnide Injectable 2 milliGRAM(s) IV Push two times a day  clopidogrel Tablet 75 milliGRAM(s) Oral daily  dextrose 40% Gel 15 Gram(s) Oral once  dextrose 5%. 1000 milliLiter(s) (50 mL/Hr) IV Continuous <Continuous>  dextrose 5%. 1000 milliLiter(s) (100 mL/Hr) IV Continuous <Continuous>  dextrose 5%. 1000 milliLiter(s) (50 mL/Hr) IV Continuous <Continuous>  dextrose 50% Injectable 25 Gram(s) IV Push once  dextrose 50% Injectable 12.5 Gram(s) IV Push once  dextrose 50% Injectable 25 Gram(s) IV Push once  escitalopram 5 milliGRAM(s) Oral daily  glucagon  Injectable 1 milliGRAM(s) IntraMuscular once  heparin   Injectable 5000 Unit(s) SubCutaneous every 8 hours  hydrALAZINE 37.5 milliGRAM(s) Oral three times a day  insulin glargine Injectable (LANTUS) 20 Unit(s) SubCutaneous at bedtime  insulin lispro (ADMELOG) corrective regimen sliding scale   SubCutaneous three times a day before meals  insulin lispro (ADMELOG) corrective regimen sliding scale   SubCutaneous at bedtime  insulin lispro Injectable (ADMELOG) 10 Unit(s) SubCutaneous three times a day before meals  levothyroxine 50 MICROGram(s) Oral daily  melatonin 3 milliGRAM(s) Oral at bedtime  metoprolol succinate  milliGRAM(s) Oral daily  pantoprazole    Tablet 40 milliGRAM(s) Oral before breakfast  senna 2 Tablet(s) Oral at bedtime  sodium bicarbonate 650 milliGRAM(s) Oral daily  spironolactone 12.5 milliGRAM(s) Oral daily    MEDICATIONS  (PRN):  polyethylene glycol 3350 17 Gram(s) Oral two times a day PRN Constipation      CAPILLARY BLOOD GLUCOSE      POCT Blood Glucose.: 214 mg/dL (21 Apr 2021 21:09)  POCT Blood Glucose.: 313 mg/dL (21 Apr 2021 18:02)  POCT Blood Glucose.: 291 mg/dL (21 Apr 2021 13:37)  POCT Blood Glucose.: 138 mg/dL (21 Apr 2021 09:14)    I&O's Summary    21 Apr 2021 07:01  -  22 Apr 2021 07:00  --------------------------------------------------------  IN: 360 mL / OUT: 2000 mL / NET: -1640 mL        PHYSICAL EXAM:  Vital Signs Last 24 Hrs  T(C): 36.8 (22 Apr 2021 04:08), Max: 36.8 (22 Apr 2021 04:08)  T(F): 98.3 (22 Apr 2021 04:08), Max: 98.3 (22 Apr 2021 04:08)  HR: 70 (22 Apr 2021 04:08) (70 - 78)  BP: 112/87 (22 Apr 2021 04:08) (106/58 - 112/87)  BP(mean): --  RR: 18 (22 Apr 2021 04:08) (17 - 18)  SpO2: 97% (22 Apr 2021 04:08) (95% - 97%)    GENERAL: frail elderly lady, NAD    HEENT: EOMI   RESPIRATORY: crackles bilateral basilar, no wheeze on NC  CARDIOVASCULAR: regular rate and rhythm, sinus on telemetry  ABDOMEN: Nontender to palpation, normoactive bowel sounds  MUSCULOSKELETAL: able to move all extremities  NEURO: Non-focal, no tremors  PSYCH: anxious-appearing   EXT: no peripheral edema  SKIN: intact     LABS:                        8.5    10.13 )-----------( 351      ( 21 Apr 2021 06:36 )             29.0     04-21    136  |  103  |  67<H>  ----------------------------<  215<H>  4.3   |  19<L>  |  1.88<H>    Ca    8.7      21 Apr 2021 21:21  Phos  3.1     04-21  Mg     2.1     04-21                  RADIOLOGY & ADDITIONAL TESTS:  Results Reviewed:   Imaging Personally Reviewed:  Electrocardiogram Personally Reviewed:    COORDINATION OF CARE:  Care Discussed with Consultants/Other Providers [Y/N]:  Prior or Outpatient Records Reviewed [Y/N]:   PROGRESS NOTE:   Authored by Nikki Kimura, MD, Pager [phone] [facility], [id number] [facility]     Patient is a 75y old  Female who presents with a chief complaint of dyspnea (21 Apr 2021 18:05)      SUBJECTIVE / OVERNIGHT EVENTS: No acute events overnight. Pt seen and examined via [facility]  ID [id number]. States she feels unwell. Breathing is okay.     ADDITIONAL REVIEW OF SYSTEMS:    MEDICATIONS  (STANDING):  aspirin enteric coated 81 milliGRAM(s) Oral daily  atorvastatin 80 milliGRAM(s) Oral at bedtime  buMETAnide Injectable 2 milliGRAM(s) IV Push two times a day  clopidogrel Tablet 75 milliGRAM(s) Oral daily  dextrose 40% Gel 15 Gram(s) Oral once  dextrose 5%. 1000 milliLiter(s) (50 mL/Hr) IV Continuous <Continuous>  dextrose 5%. 1000 milliLiter(s) (100 mL/Hr) IV Continuous <Continuous>  dextrose 5%. 1000 milliLiter(s) (50 mL/Hr) IV Continuous <Continuous>  dextrose 50% Injectable 25 Gram(s) IV Push once  dextrose 50% Injectable 12.5 Gram(s) IV Push once  dextrose 50% Injectable 25 Gram(s) IV Push once  escitalopram 5 milliGRAM(s) Oral daily  glucagon  Injectable 1 milliGRAM(s) IntraMuscular once  heparin   Injectable 5000 Unit(s) SubCutaneous every 8 hours  hydrALAZINE 37.5 milliGRAM(s) Oral three times a day  insulin glargine Injectable (LANTUS) 20 Unit(s) SubCutaneous at bedtime  insulin lispro (ADMELOG) corrective regimen sliding scale   SubCutaneous three times a day before meals  insulin lispro (ADMELOG) corrective regimen sliding scale   SubCutaneous at bedtime  insulin lispro Injectable (ADMELOG) 10 Unit(s) SubCutaneous three times a day before meals  levothyroxine 50 MICROGram(s) Oral daily  melatonin 3 milliGRAM(s) Oral at bedtime  metoprolol succinate  milliGRAM(s) Oral daily  pantoprazole    Tablet 40 milliGRAM(s) Oral before breakfast  senna 2 Tablet(s) Oral at bedtime  sodium bicarbonate 650 milliGRAM(s) Oral daily  spironolactone 12.5 milliGRAM(s) Oral daily    MEDICATIONS  (PRN):  polyethylene glycol 3350 17 Gram(s) Oral two times a day PRN Constipation      CAPILLARY BLOOD GLUCOSE      POCT Blood Glucose.: 214 mg/dL (21 Apr 2021 21:09)  POCT Blood Glucose.: 313 mg/dL (21 Apr 2021 18:02)  POCT Blood Glucose.: 291 mg/dL (21 Apr 2021 13:37)  POCT Blood Glucose.: 138 mg/dL (21 Apr 2021 09:14)    I&O's Summary    21 Apr 2021 07:01  -  22 Apr 2021 07:00  --------------------------------------------------------  IN: 360 mL / OUT: 2000 mL / NET: -1640 mL        PHYSICAL EXAM:  Vital Signs Last 24 Hrs  T(C): 36.8 (22 Apr 2021 04:08), Max: 36.8 (22 Apr 2021 04:08)  T(F): 98.3 (22 Apr 2021 04:08), Max: 98.3 (22 Apr 2021 04:08)  HR: 70 (22 Apr 2021 04:08) (70 - 78)  BP: 112/87 (22 Apr 2021 04:08) (106/58 - 112/87)  BP(mean): --  RR: 18 (22 Apr 2021 04:08) (17 - 18)  SpO2: 97% (22 Apr 2021 04:08) (95% - 97%)    GENERAL: frail elderly lady, NAD    HEENT: EOMI   RESPIRATORY: crackles bilateral basilar, no wheeze on NC  CARDIOVASCULAR: regular rate and rhythm, sinus on telemetry  ABDOMEN: Nontender to palpation, normoactive bowel sounds  MUSCULOSKELETAL: able to move all extremities  NEURO: Non-focal, no tremors  PSYCH: anxious-appearing   EXT: no peripheral edema  SKIN: intact     LABS:                        8.5    10.13 )-----------( 351      ( 21 Apr 2021 06:36 )             29.0     04-21    136  |  103  |  67<H>  ----------------------------<  215<H>  4.3   |  19<L>  |  1.88<H>    Ca    8.7      21 Apr 2021 21:21  Phos  3.1     04-21  Mg     2.1     04-21                  RADIOLOGY & ADDITIONAL TESTS:  Results Reviewed:   Imaging Personally Reviewed:  Electrocardiogram Personally Reviewed:    COORDINATION OF CARE:  Care Discussed with Consultants/Other Providers [Y/N]:  Prior or Outpatient Records Reviewed [Y/N]:

## 2021-04-22 NOTE — PROGRESS NOTE ADULT - NS PRO AD PATIENT TYPE
Health Care Proxy (HCP)/Medical Orders for Life-Sustaining Treatment (MOLST)
Health Care Proxy (HCP)/Medical Orders for Life-Sustaining Treatment (MOLST)

## 2021-04-22 NOTE — PROGRESS NOTE ADULT - ASSESSMENT
75y.o F Kazakh speaking h/o DM, CAD s/p CABG 2014 and prior PCI, severe mitral regurgitation (s/p mitral clip 2019), pulmonary HTN (TTE 2019), HF (EF 21 % June 2019), CKD IV not on dialysis (b/l SCr 2-2.2), admitted for CHF exacerbation, on IV bumex  admitted for tachypnea 2/2 acute on chronic HFrEF exacerbation.

## 2021-04-22 NOTE — PROGRESS NOTE ADULT - SUBJECTIVE AND OBJECTIVE BOX
[facility] [place] [facility]   MD Dion Dasilva MD, D.O. Ruoru Wong, [place]    From 7 AM - 5 PM:  OFFICE: [phone]  Dr. Muhammad cell: [phone]  Dr. Montero cell: [phone]  Dr. Soria cell: [phone]  RASHIDA Smith cell: [phone]    From 5 PM - 7 AM: Answering Service: [phone]  Date of service: 04-22-21 @ 12:12    RENAL FOLLOW UP NOTE  --------------------------------------------------------------------------------  HPI:  Pt seen and examined at bedside.   Denies SOB, chest pain     PAST HISTORY  --------------------------------------------------------------------------------  No significant changes to PMH, PSH, FHx, SHx, unless otherwise noted    ALLERGIES & MEDICATIONS  --------------------------------------------------------------------------------  Allergies    azithromycin (Hives; Pruritus)    Intolerances      Standing Inpatient Medications  aspirin enteric coated 81 milliGRAM(s) Oral daily  atorvastatin 80 milliGRAM(s) Oral at bedtime  buMETAnide Injectable 2 milliGRAM(s) IV Push two times a day  clopidogrel Tablet 75 milliGRAM(s) Oral daily  dextrose 40% Gel 15 Gram(s) Oral once  dextrose 5%. 1000 milliLiter(s) IV Continuous <Continuous>  dextrose 5%. 1000 milliLiter(s) IV Continuous <Continuous>  dextrose 5%. 1000 milliLiter(s) IV Continuous <Continuous>  dextrose 50% Injectable 25 Gram(s) IV Push once  dextrose 50% Injectable 12.5 Gram(s) IV Push once  dextrose 50% Injectable 25 Gram(s) IV Push once  escitalopram 5 milliGRAM(s) Oral daily  glucagon  Injectable 1 milliGRAM(s) IntraMuscular once  heparin   Injectable 5000 Unit(s) SubCutaneous every 8 hours  hydrALAZINE 37.5 milliGRAM(s) Oral three times a day  insulin glargine Injectable (LANTUS) 20 Unit(s) SubCutaneous at bedtime  insulin lispro (ADMELOG) corrective regimen sliding scale   SubCutaneous three times a day before meals  insulin lispro (ADMELOG) corrective regimen sliding scale   SubCutaneous at bedtime  insulin lispro Injectable (ADMELOG) 10 Unit(s) SubCutaneous three times a day before meals  levothyroxine 50 MICROGram(s) Oral daily  melatonin 3 milliGRAM(s) Oral at bedtime  metoprolol succinate  milliGRAM(s) Oral daily  pantoprazole    Tablet 40 milliGRAM(s) Oral before breakfast  senna 2 Tablet(s) Oral at bedtime  sodium bicarbonate 650 milliGRAM(s) Oral daily  spironolactone 12.5 milliGRAM(s) Oral daily    PRN Inpatient Medications  polyethylene glycol 3350 17 Gram(s) Oral two times a day PRN      REVIEW OF SYSTEMS  --------------------------------------------------------------------------------  General: no fever  CVS: no chest pain  RESP: no sob, no cough  ABD: no abdominal pain  : no dysuria,  MSK: no edema     VITALS/PHYSICAL EXAM  --------------------------------------------------------------------------------  T(C): 36.5 (04-22-21 @ 10:07), Max: 36.8 (04-22-21 @ 04:08)  HR: 79 (04-22-21 @ 11:11) (70 - 79)  BP: 96/59 (04-22-21 @ 11:11) (96/59 - 112/87)  RR: 20 (04-22-21 @ 11:11) (17 - 20)  SpO2: 96% (04-22-21 @ 11:11) (95% - 97%)  Wt(kg): --        04-21-21 @ 07:01  -  04-22-21 @ 07:00  --------------------------------------------------------  IN: 360 mL / OUT: 2000 mL / NET: -1640 mL      Physical Exam:  	Gen: NAD  	HEENT: MMM  	Pulm: CTA B/L  	CV: S1S2  	Abd: Soft, +BS  	Ext: No LE edema B/L                      Neuro: Awake   	Skin: Warm and Dry   	    LABS/STUDIES  --------------------------------------------------------------------------------              8.4    10.24 >-----------<  368      [04-22-21 @ 07:19]              29.3     138  |  102  |  68  ----------------------------<  165      [04-22-21 @ 07:07]  4.3   |  21  |  1.87        Ca     9.0     [04-22-21 @ 07:07]      Mg     2.2     [04-22-21 @ 07:07]      Phos  3.5     [04-22-21 @ 07:07]      Creatinine Trend:  SCr 1.87 [04-22 @ 07:07]  SCr 1.88 [04-21 @ 21:21]  SCr 1.80 [04-21 @ 06:34]  SCr 1.69 [04-20 @ 07:18]  SCr 1.71 [04-19 @ 19:33]        Ferritin 111      [01-13-21 @ 01:42]  HbA1c 7.5      [10-08-19 @ 14:30]  TSH 1.94      [04-17-21 @ 02:28]

## 2021-04-22 NOTE — PROGRESS NOTE ADULT - AGENT'S NAME
1ry, Elsa Negrete, daughter, 2ry. Mohsin Caden, son in law.
1ry, Elsa Negrete, daughter, 2ry. Mohsin Caden, son in law.

## 2021-04-22 NOTE — PROGRESS NOTE ADULT - PROBLEM SELECTOR PLAN 1
- admitted for acute on chronic systolic HF exacerbation (tachypneic to 30s, on non-rebreather in ED, needing bipap on floors for resp distress, now on NC). CXR on admission with pulm edema, BNP 6000. Less likely ACS (trop 49-> 42)  - last HF admission 1/2021, requiring milrinone assisted diuresis.   - continue on 2mg IV bumex BID with net neg 820cc over last 24h. continue  - continue home dose: hydral 37.5 TID, metoprolol succinate 100mg QD. started on spironolactone 12.5mg QD this admission.   - TTE 4/19: EF 25-30%, mild MR with clip, severe global LV dysfunction, decreased RV dysfunction, mod-severe TR, mod pulm pressures.   - Cr ~1.5 (baseline 2).  - daily weights, strict in/out  - HF following, f/u recs  - palliative consulted per family request for multiple HF exacerbation admissions. family is undecided regarding dispo, state they are overwhelemed with caring for her at home but do not want nursing home and wish to speak with palliative. leaning toward inpat hospice. - admitted for acute on chronic systolic HF exacerbation (tachypneic to 30s, on non-rebreather in ED, needing bipap on floors for resp distress, now on NC). CXR on admission with pulm edema, BNP 6000. Less likely ACS (trop 49-> 42)  - last HF admission 1/2021, requiring milrinone assisted diuresis.   - continue on 2mg IV bumex BID with net neg 1.6L over last 24h. continue  - continue home dose: hydral 37.5 TID, metoprolol succinate 100mg QD. started on spironolactone 12.5mg QD this admission.   - TTE 4/19: EF 25-30%, mild MR with clip, severe global LV dysfunction, decreased RV dysfunction, mod-severe TR, mod pulm pressures.   - Cr ~1.5 (baseline 2).  - daily weights, strict in/out  - HF following, f/u recs  - palliative consulted per family request for multiple HF exacerbation admissions. family is undecided regarding dispo, state they are overwhelmed with caring for her at home but do not want nursing home and wish to speak with palliative. leaning toward inpatient hospice.

## 2021-04-22 NOTE — PROGRESS NOTE ADULT - PROBLEM SELECTOR PLAN 4
home regimen is Lantus to 45u at bedtime/25u AM  - on increasing doses of lantus and premeal   - Patient counseled for compliance with consistent low carb diet and exercise as tolerated outpatient.

## 2021-04-22 NOTE — PROGRESS NOTE ADULT - SUBJECTIVE AND OBJECTIVE BOX
HPI:  75y.o F Arabic speaking h/o DM, CAD s/p CABG 2014 and prior PCI, severe mitral regurgitation (s/p mitral clip 2019), pulmonary HTN (TTE 2019), HF (EF 21 % June 2019), CKD IV not on dialysis (b/l SCr 2-2.2), admitted for tachypnea 2/2 acute on chronic HFrEF exacerbation. Currently on IV Bumex. Palliative care was called for GOC and ACP.     4/20. The patient was lethargic and able to nod to simple questions. She denied pain or dyspnea. However, she was not able to follow up a conversation and was mostly non verbal.   4/21. The patient was still lethargic but less compared with yesterday. She was able to denied pain or dyspnea, though she was still having labored breathing. She was not able to have a discussion about her illness or Rx options.   4/22. The patient was more alert but c/o Anxiety and being afraid. She appeared labored breathing. She denied pain     PERTINENT PM/SXH:   Diabetes mellitus type 2 in nonobese    Diabetes mellitus, type 2    Hyperlipidemia    Hypothyroidism    CAD (coronary artery disease)    Hypertension    GERD (gastroesophageal reflux disease)    Urinary tract infection    Heart failure    H/O pulmonary hypertension      S/P CABG (coronary artery bypass graft)    S/P mitral valve clip implantation      FAMILY HISTORY:  Family history of diabetes mellitus (Sibling)  brothers/sisters    Family history of hypertension (Sibling)  sister      ITEMS NOT CHECKED ARE NOT PRESENT    SOCIAL HISTORY:   Significant other/partner[x ] Single  Children[x ] Yes  Alevism/Spirituality: Other   Substance hx:  [ ]   Tobacco hx:  [ ]   Alcohol hx: [ ]   Home Opioid hx:  [ ] I-Stop Reference No:  Living Situation: [ x]Home  [ ]Long term care  [ ]Rehab [ ]Other  Call daughter Elsa she and   Mohsin  who is on contact information has  same number.  Spoke to pt son in law Mohsin . Introduced myself and give contact information  . discussed role of case management. medical plan of care and discharge plan .  Mohsin verbalized understanding . As per son in law Pt live with him  and pt  daughter in private house . there are  4 step to entrance and pt on main floor.  Pt has a walker and cane but  cannot use. unable to walk . assist x2 to radha  and transfer to  recliner near bed and to bathroom  which is not very away .   Has wheelchair for outdoor use.  Pt was recently d/c from [facility] and o2  concentrator and portable tank was delivered( cannot recall company name at  present. ) has glucometer and  daughter does finger stick. [facility]. cdpap is other family member [facility]  [phone]/ [phone]  m-f  7hr . s-s 4hr.  Requesting increased in hours . interested in palliative if pt  meet criteria . do not want ltc in ngs home.   ADVANCE DIRECTIVES:    DNR  MOLST  [ ]  Living Will  [ ]   DECISION MAKER(s):  [x ] Health Care Proxy(s)  [ ] Surrogate(s)  [ ] Guardian           Name(s): Phone Number(s): See GOC note below.     BASELINE (I)ADL(s) (prior to admission):  Frio: [ ]Total  [ ] Moderate [ x]Dependent    Allergies    azithromycin (Hives; Pruritus)    Intolerances    MEDICATIONS  (STANDING):  aspirin enteric coated 81 milliGRAM(s) Oral daily  atorvastatin 80 milliGRAM(s) Oral at bedtime  buMETAnide Injectable 2 milliGRAM(s) IV Push two times a day  clopidogrel Tablet 75 milliGRAM(s) Oral daily  dextrose 40% Gel 15 Gram(s) Oral once  dextrose 5%. 1000 milliLiter(s) (50 mL/Hr) IV Continuous <Continuous>  dextrose 5%. 1000 milliLiter(s) (100 mL/Hr) IV Continuous <Continuous>  dextrose 5%. 1000 milliLiter(s) (50 mL/Hr) IV Continuous <Continuous>  dextrose 50% Injectable 25 Gram(s) IV Push once  dextrose 50% Injectable 12.5 Gram(s) IV Push once  dextrose 50% Injectable 25 Gram(s) IV Push once  escitalopram 5 milliGRAM(s) Oral daily  glucagon  Injectable 1 milliGRAM(s) IntraMuscular once  heparin   Injectable 5000 Unit(s) SubCutaneous every 8 hours  hydrALAZINE 37.5 milliGRAM(s) Oral three times a day  insulin glargine Injectable (LANTUS) 20 Unit(s) SubCutaneous at bedtime  insulin lispro (ADMELOG) corrective regimen sliding scale   SubCutaneous three times a day before meals  insulin lispro (ADMELOG) corrective regimen sliding scale   SubCutaneous at bedtime  insulin lispro Injectable (ADMELOG) 10 Unit(s) SubCutaneous three times a day before meals  levothyroxine 50 MICROGram(s) Oral daily  melatonin 3 milliGRAM(s) Oral at bedtime  metoprolol succinate  milliGRAM(s) Oral daily  pantoprazole    Tablet 40 milliGRAM(s) Oral before breakfast  senna 2 Tablet(s) Oral at bedtime  sodium bicarbonate 650 milliGRAM(s) Oral daily  spironolactone 12.5 milliGRAM(s) Oral daily    MEDICATIONS  (PRN):  glycopyrrolate Injectable 0.4 milliGRAM(s) IV Push every 6 hours PRN Secretions.  HYDROmorphone  Injectable 0.2 milliGRAM(s) IV Push every 3 hours PRN Moderate to Severe pain  HYDROmorphone  Injectable 0.2 milliGRAM(s) IV Push every 3 hours PRN Dyspnea, labored breathing or RR > 28  LORazepam   Injectable 0.2 milliGRAM(s) IV Push every 8 hours PRN Anxiety or intractable respiratory distress.  polyethylene glycol 3350 17 Gram(s) Oral two times a day PRN Constipation    PRESENT SYMPTOMS: [x ]Unable to obtain due to poor mentation   Source if other than patient:  [ ]Family   [ ]Team     Pain: [ ]yes [x ]no  QOL impact -   Location -                    Aggravating factors -  Quality -  Radiation -  Timing-  Severity (0-10 scale):  Minimal acceptable level (0-10 scale):     CPOT:    https://www.[facility].org/getattachment/[email or web address]/Critical-Care-Pain-Observation-Tool-(CPOT)      PAIN AD Score: 0    http://geriatrictoolkit.[place].[facility]/cog/painad.pdf (press ctrl +  left click to view)    Dyspnea:                           [ ]Mild [ ]Moderate [ ]Severe  Anxiety:                             [ ]Mild [ ]Moderate [ ]Severe  Fatigue:                             [ ]Mild [ ]Moderate [ ]Severe  Nausea:                             [ ]Mild [ ]Moderate [ ]Severe  Loss of appetite:              [ ]Mild [ ]Moderate [ ]Severe  Constipation:                    [ ]Mild [ ]Moderate [ ]Severe    Other Symptoms:  [ ]All other review of systems negative     Palliative Performance Status Version 2: 10-20      %    http://npcrc.org/files/news/palliative_performance_scale_ppsv2.pdf  PHYSICAL EXAM:  Vital Signs Last 24 Hrs  T(C): 36.6 (21 Apr 2021 13:08), Max: 36.7 (21 Apr 2021 06:37)  T(F): 97.8 (21 Apr 2021 13:08), Max: 98 (21 Apr 2021 06:37)  HR: 78 (21 Apr 2021 17:25) (76 - 85)  BP: 106/58 (21 Apr 2021 17:25) (103/68 - 120/60)  BP(mean): --  RR: 17 (21 Apr 2021 13:08) (17 - 18)  SpO2: 95% (21 Apr 2021 13:08) (95% - 98%)      GENERAL:  [ ]Alert  [ ]Oriented x   [x ]Lethargic but more alert than yesterday  [ ]Cachexia  [ ]Unarousable  [ ]Verbal  [x ] Mostly Non-Verbal  Behavioral:   [ ] Anxiety  [ ] Delirium [ ] Agitation [ ] Other  HEENT:  [ x]Normal   [ ]Dry mouth   [ ]ET Tube/Trach  [ ]Oral lesions  PULMONARY:   [ ]Clear [ ]Tachypnea  [ ]Audible excessive secretions   [ ]Rhonchi        [ ]Right [ ]Left [ ]Bilateral  [x ]Crackles        [ ]Right [ ]Left [ x]Bilateral  [ ]Wheezing     [ ]Right [ ]Left [ ]Bilateral  [ x]Diminished breath sounds [ ]right [ ]left [x ]bilateral  [x] labored breathing   CARDIOVASCULAR:    [x ]Regular [ ]Irregular [ ]Tachy  [ ]Ramesh [ ]Murmur [ ]Other  GASTROINTESTINAL:  [x ]Soft  [ ]Distended   [ x]+BS  [x ]Non tender [ ]Tender  [ ]PEG [ ]OGT/ NGT  Last BM: 4/22    GENITOURINARY:  [ ]Normal [ x] Incontinent   [ ]Oliguria/Anuria   [ ]Oscar  MUSCULOSKELETAL:   [ ]Normal   [ x]Weakness  [ ]Bed/Wheelchair bound [ ]Edema  NEUROLOGIC:   [ ]No focal deficits  [ ]Cognitive impairment  [ ]Dysphagia [ ]Dysarthria [ ]Paresis [x ]Other: Lethargic   SKIN:   [x ]Normal    [ ]Rash  [ ]Pressure ulcer(s)       Present on admission [ ]y [ ]n    CRITICAL CARE:  [ ] Shock Present  [ ]Septic [ ]Cardiogenic [ ]Neurologic [ ]Hypovolemic  [ ]  Vasopressors [ ]  Inotropes   [ ]Respiratory failure present [ ]Mechanical ventilation [ ]Non-invasive ventilatory support [ ]High flow    [ ]Acute  [ ]Chronic [ ]Hypoxic  [ ]Hypercarbic [ ]Other  [ ]Other organ failure     LABS:                                                          8.4    10.24 )-----------( 368      ( 22 Apr 2021 07:19 )             29.3   04-22    138  |  102  |  68<H>  ----------------------------<  165<H>  4.3   |  21<L>  |  1.87<H>    Ca    9.0      22 Apr 2021 07:07  Phos  3.5     04-22  Mg     2.2     04-22          RADIOLOGY & ADDITIONAL STUDIES:  < from: Xray Chest 1 View- PORTABLE-Urgent (Xray Chest 1 View- PORTABLE-Urgent .) (04.17.21 @ 15:41) >  IMPRESSION: Unchanged right pleural effusion and pulmonary edema.                LUANA SOL MD; Attending Radiologist  This document has been electronically signed. Apr 18 2021 10:21AM    < end of copied text >    cho< from: TTE with Doppler (w/Cont) (04.19.21 @ 06:38) >    Patient name: SELVIN CLAIRE  YOB: 1945   Age: 75 (F)   MR#: [id number]  Study Date: 4/19/2021  Location: [address] [place], [place] [id number]: Nilam Shannon, RDCSEF (Visual Estimate): 25-30 %Severe  global left ventricular systolic dysfunction.5. Right ventricular enlargement with decreased right  ventricular systolic function.moderate pulmonary pressures.    < end of copied text >    PROTEIN CALORIE MALNUTRITION PRESENT: [ ]mild [ ]moderate [ ]severe [ ]underweight [ ]morbid obesity  https://www.andeal.org/vault/2440/web/files/ONC/Table_Clinical%20Characteristics%20to%20Document%20Malnutrition-White%20JV%20et%20al%202012.pdf    Height (cm): 149.9 (04-16-21 @ 22:31), 149.9 (01-13-21 @ 01:15)  Weight (kg): 53 (01-25-21 @ 13:11)  BMI (kg/m2): 23.6 (04-16-21 @ 22:31), 23.6 (01-25-21 @ 13:11)    [ ]PPSV2 < or = to 30% [ ]significant weight loss  [ ]poor nutritional intake  [ ]anasarca     Albumin, Serum: 3.4 g/dL (04-19-21 @ 01:13)   [ ]Artificial Nutrition      REFERRALS:   [ ]Chaplaincy  [ ]Hospice  [ ]Child Life  [ ]Social Work  [ ]Case management [ ]Holistic Therapy     Goals of Care Document:

## 2021-04-22 NOTE — PROGRESS NOTE ADULT - ATTENDING COMMENTS
76yo F Indonesian speaking h/o HTN, HLD, DM, CAD s/p CABG 2014 and prior PCI, severe mitral regurgitation (s/p mitral clip 2019), pulmonary HTN (TTE 2019), HF (EF 21 % June 2019), CKD IV not on dialysis (b/l SCr 2-2.2), hypothyroidism admitted for acute hypoxic respiratory failure and acute on chronic HFrEF exacerbation. C/w current dose of IV bumex. Net neg 1.6L over last 24 hrs after augmentation of diuresis with additional Metolazone 2.5mg x1.  On 2L NC. Monitor I/O and daily weights. Wean O2 as tolerated. Zoloft started for anxiety. Palliative consulted given family request, family leaning toward inpatient hospice. Renal function remains stable.

## 2021-04-22 NOTE — PROGRESS NOTE ADULT - SUBJECTIVE AND OBJECTIVE BOX
S: No distress, Denies SOB or chest pain. Weaned to 2L NC. Review of systems otherwise (-)    Review of Systems:   Constitutional: [ ] fevers, [ ] chills.   Skin: [ ] dry skin. [ ] rashes.  Psychiatric: [ ] depression, [ ] anxiety.   Gastrointestinal: [ ] BRBPR, [ ] melena.   Neurological: [ ] confusion. [ ] seizures. [ ] shuffling gait.   Ears,Nose,Mouth and Throat: [ ] ear pain [ ] sore throat.   Eyes: [ ] diplopia.   Respiratory: [ ] hemoptysis. [ ] shortness of breath  Cardiovascular: See HPI above  Hematologic/Lymphatic: [ ] anemia. [ ] painful nodes. [ ] prolonged bleeding.   Genitourinary: [ ] hematuria. [ ] flank pain.   Endocrine: [ ] significant change in weight. [ ] intolerance to heat and cold.     Review of systems [x ] otherwise negative, [ ] otherwise unable to obtain    FH: no family history of sudden cardiac death in first degree relatives    SH: [ ] tobacco, [ ] alcohol, [ ] drugs      MEDICATIONS  (STANDING):  aspirin enteric coated 81 milliGRAM(s) Oral daily  atorvastatin 80 milliGRAM(s) Oral at bedtime  buMETAnide Injectable 2 milliGRAM(s) IV Push two times a day  clopidogrel Tablet 75 milliGRAM(s) Oral daily  dextrose 40% Gel 15 Gram(s) Oral once  dextrose 5%. 1000 milliLiter(s) (50 mL/Hr) IV Continuous <Continuous>  dextrose 5%. 1000 milliLiter(s) (100 mL/Hr) IV Continuous <Continuous>  dextrose 5%. 1000 milliLiter(s) (50 mL/Hr) IV Continuous <Continuous>  dextrose 50% Injectable 25 Gram(s) IV Push once  dextrose 50% Injectable 12.5 Gram(s) IV Push once  dextrose 50% Injectable 25 Gram(s) IV Push once  escitalopram 5 milliGRAM(s) Oral daily  glucagon  Injectable 1 milliGRAM(s) IntraMuscular once  heparin   Injectable 5000 Unit(s) SubCutaneous every 8 hours  hydrALAZINE 37.5 milliGRAM(s) Oral three times a day  insulin glargine Injectable (LANTUS) 20 Unit(s) SubCutaneous at bedtime  insulin lispro (ADMELOG) corrective regimen sliding scale   SubCutaneous three times a day before meals  insulin lispro (ADMELOG) corrective regimen sliding scale   SubCutaneous at bedtime  insulin lispro Injectable (ADMELOG) 10 Unit(s) SubCutaneous three times a day before meals  levothyroxine 50 MICROGram(s) Oral daily  melatonin 3 milliGRAM(s) Oral at bedtime  metoprolol succinate  milliGRAM(s) Oral daily  pantoprazole    Tablet 40 milliGRAM(s) Oral before breakfast  senna 2 Tablet(s) Oral at bedtime  sodium bicarbonate 650 milliGRAM(s) Oral daily  spironolactone 12.5 milliGRAM(s) Oral daily    MEDICATIONS  (PRN):  polyethylene glycol 3350 17 Gram(s) Oral two times a day PRN Constipation      LABS:                          8.4    10.24 )-----------( 368      ( 22 Apr 2021 07:19 )             29.3     Hemoglobin: 8.4 g/dL (04-22 @ 07:19)  Hemoglobin: 8.5 g/dL (04-21 @ 06:36)  Hemoglobin: 8.4 g/dL (04-20 @ 07:18)  Hemoglobin: 8.8 g/dL (04-19 @ 01:01)  Hemoglobin: 8.7 g/dL (04-18 @ 11:35)    04-22    138  |  102  |  68<H>  ----------------------------<  165<H>  4.3   |  21<L>  |  1.87<H>    Ca    9.0      22 Apr 2021 07:07  Phos  3.5     04-22  Mg     2.2     04-22      Creatinine Trend: 1.87<--, 1.88<--, 1.80<--, 1.69<--, 1.71<--, 1.58<--             04-21-21 @ 07:01  -  04-22-21 @ 07:00  --------------------------------------------------------  IN: 360 mL / OUT: 2000 mL / NET: -1640 mL        PHYSICAL EXAM  Vital Signs Last 24 Hrs  T(C): 36.5 (22 Apr 2021 10:07), Max: 36.8 (22 Apr 2021 04:08)  T(F): 97.7 (22 Apr 2021 10:07), Max: 98.3 (22 Apr 2021 04:08)  HR: 79 (22 Apr 2021 11:11) (70 - 79)  BP: 96/59 (22 Apr 2021 11:11) (96/59 - 112/87)  BP(mean): --  RR: 20 (22 Apr 2021 11:11) (17 - 20)  SpO2: 96% (22 Apr 2021 11:11) (95% - 97%)        General: Well nourished in no acute distress. Alert and Oriented * 3.   Head: Normocephalic and atraumatic.   Neck: No JVD. No bruits. Supple. Does not appear to be enlarged.   Cardiovascular: + S1,S2 ; RRR Soft systolic murmur at the left lower sternal border. No rubs noted.    Lungs: CTA b/l. No rhonchi, rales or wheezes.   Abdomen: + BS, soft. Non tender. Non distended. No rebound. No guarding.   Extremities: No clubbing/cyanosis/edema.   Neurologic: Moves all four extremities. Full range of motion.   Skin: Warm and moist. The patient's skin has normal elasticity and good skin turgor.   Psychiatric: Appropriate mood and affect.  Musculoskeletal: Normal range of motion, normal strength    TELEMETRY: SR 70-80s    < from: TTE with Doppler (w/Cont) (04.19.21 @ 06:38) >  Conclusions:  1. Mitral clip seen. Mild mitral regurgitation.  Mean  transmitral valve gradient equals 5-6 mm Hg, which is  probably normal/milldy elevated  in the setting of a  Mitracip. HR 90s  2. Minimal aortic regurgitation.  3.  Mild left ventricular enlargement.  4. Endocardial visualization enhanced with intravenous  injection of Ultrasonic Enhancing Agent(Definity).  Severe  global left ventricular systolic dysfunction.  5. Right ventricular enlargement with decreased right  ventricular systolic function.  6. Normal tricuspid valve. Moderate-severe tricuspid  regurgitation.  7. Estimated pulmonary artery systolic pressure equals 57  mm Hg, assuming right atrial pressure equals 8 mm Hg,  consistent with moderate pulmonary pressures.    < end of copied text >      ASSESSMENT/PLAN: 	75y.o F Greenlandic speaking h/o HTN, HLD, DM, CAD s/p CABG 2014 and prior PCI, severe mitral regurgitation (s/p mitral clip 2019), pulmonary HTN (TTE 2019), HF (EF 21 % June 2019), CKD IV not on dialysis (b/l SCr 2-2.2), hypothyroidism admitted for tachypnea 2/2 acute on chronic HFrEF exacerbation.     - Keep net negative with IV bumex/aldactone - strict I/Os  - Wean O2 as tolerated - now on 2L NC  - Continue hydralazine/Toprol XL  - dapt/statin for prior stents   - Monitor creatine/lytes  - Anxiety treatment prn per med  - TTE noted - EF 25-30%, decreased RV function, mod-severe TR, mod pulm HTN  - F/u ongoing palliative discussion    Amauri Hill PA-C  Pager: [phone]

## 2021-04-22 NOTE — PROGRESS NOTE ADULT - ASSESSMENT
75y.o F Macedonian speaking h/o DM, CAD s/p CABG 2014 and prior PCI, severe mitral regurgitation (s/p mitral clip 2019), pulmonary HTN (TTE 2019), HF (EF 21 % June 2019), CKD IV not on dialysis (b/l SCr 2-2.2), admitted for tachypnea 2/2 acute on chronic HFrEF exacerbation. Currently on IV Bumex. Palliative care was called for GOC and ACP.

## 2021-04-22 NOTE — PROGRESS NOTE ADULT - PROBLEM SELECTOR PLAN 3
On Lopressor, Aldactone, Hydralazine, and Bumex.   Management as per cardio and primary team.   GOC as above.

## 2021-04-23 DIAGNOSIS — N17.9 ACUTE KIDNEY FAILURE, UNSPECIFIED: ICD-10-CM

## 2021-04-23 LAB
ANION GAP SERPL CALC-SCNC: 14 MMOL/L — SIGNIFICANT CHANGE UP (ref 5–17)
BUN SERPL-MCNC: 79 MG/DL — HIGH (ref 7–23)
CALCIUM SERPL-MCNC: 9 MG/DL — SIGNIFICANT CHANGE UP (ref 8.4–10.5)
CHLORIDE SERPL-SCNC: 100 MMOL/L — SIGNIFICANT CHANGE UP (ref 96–108)
CO2 SERPL-SCNC: 22 MMOL/L — SIGNIFICANT CHANGE UP (ref 22–31)
CREAT SERPL-MCNC: 2.2 MG/DL — HIGH (ref 0.5–1.3)
GLUCOSE BLDC GLUCOMTR-MCNC: 119 MG/DL — HIGH (ref 70–99)
GLUCOSE BLDC GLUCOMTR-MCNC: 165 MG/DL — HIGH (ref 70–99)
GLUCOSE BLDC GLUCOMTR-MCNC: 166 MG/DL — HIGH (ref 70–99)
GLUCOSE BLDC GLUCOMTR-MCNC: 184 MG/DL — HIGH (ref 70–99)
GLUCOSE SERPL-MCNC: 128 MG/DL — HIGH (ref 70–99)
HCT VFR BLD CALC: 27.3 % — LOW (ref 34.5–45)
HGB BLD-MCNC: 8 G/DL — LOW (ref 11.5–15.5)
MAGNESIUM SERPL-MCNC: 2.3 MG/DL — SIGNIFICANT CHANGE UP (ref 1.6–2.6)
MCHC RBC-ENTMCNC: 22.6 PG — LOW (ref 27–34)
MCHC RBC-ENTMCNC: 29.3 GM/DL — LOW (ref 32–36)
MCV RBC AUTO: 77.1 FL — LOW (ref 80–100)
NRBC # BLD: 0 /100 WBCS — SIGNIFICANT CHANGE UP (ref 0–0)
PHOSPHATE SERPL-MCNC: 4.1 MG/DL — SIGNIFICANT CHANGE UP (ref 2.5–4.5)
PLATELET # BLD AUTO: 348 K/UL — SIGNIFICANT CHANGE UP (ref 150–400)
POTASSIUM SERPL-MCNC: 4.4 MMOL/L — SIGNIFICANT CHANGE UP (ref 3.5–5.3)
POTASSIUM SERPL-SCNC: 4.4 MMOL/L — SIGNIFICANT CHANGE UP (ref 3.5–5.3)
RBC # BLD: 3.54 M/UL — LOW (ref 3.8–5.2)
RBC # FLD: 20 % — HIGH (ref 10.3–14.5)
SODIUM SERPL-SCNC: 136 MMOL/L — SIGNIFICANT CHANGE UP (ref 135–145)
WBC # BLD: 9.61 K/UL — SIGNIFICANT CHANGE UP (ref 3.8–10.5)
WBC # FLD AUTO: 9.61 K/UL — SIGNIFICANT CHANGE UP (ref 3.8–10.5)

## 2021-04-23 PROCEDURE — 71045 X-RAY EXAM CHEST 1 VIEW: CPT | Mod: 26

## 2021-04-23 PROCEDURE — 99233 SBSQ HOSP IP/OBS HIGH 50: CPT

## 2021-04-23 PROCEDURE — 99232 SBSQ HOSP IP/OBS MODERATE 35: CPT | Mod: GC

## 2021-04-23 RX ORDER — HYDROMORPHONE HYDROCHLORIDE 2 MG/ML
0.2 INJECTION INTRAMUSCULAR; INTRAVENOUS; SUBCUTANEOUS EVERY 8 HOURS
Refills: 0 | Status: DISCONTINUED | OUTPATIENT
Start: 2021-04-23 | End: 2021-04-28

## 2021-04-23 RX ORDER — BUMETANIDE 0.25 MG/ML
2 INJECTION INTRAMUSCULAR; INTRAVENOUS
Refills: 0 | Status: DISCONTINUED | OUTPATIENT
Start: 2021-04-23 | End: 2021-04-29

## 2021-04-23 RX ADMIN — Medication 1: at 14:14

## 2021-04-23 RX ADMIN — HEPARIN SODIUM 5000 UNIT(S): 5000 INJECTION INTRAVENOUS; SUBCUTANEOUS at 13:37

## 2021-04-23 RX ADMIN — Medication 1: at 17:52

## 2021-04-23 RX ADMIN — PANTOPRAZOLE SODIUM 40 MILLIGRAM(S): 20 TABLET, DELAYED RELEASE ORAL at 05:47

## 2021-04-23 RX ADMIN — Medication 650 MILLIGRAM(S): at 09:18

## 2021-04-23 RX ADMIN — Medication 1: at 09:16

## 2021-04-23 RX ADMIN — HEPARIN SODIUM 5000 UNIT(S): 5000 INJECTION INTRAVENOUS; SUBCUTANEOUS at 22:27

## 2021-04-23 RX ADMIN — HYDROMORPHONE HYDROCHLORIDE 0.2 MILLIGRAM(S): 2 INJECTION INTRAMUSCULAR; INTRAVENOUS; SUBCUTANEOUS at 22:27

## 2021-04-23 RX ADMIN — HYDROMORPHONE HYDROCHLORIDE 0.2 MILLIGRAM(S): 2 INJECTION INTRAMUSCULAR; INTRAVENOUS; SUBCUTANEOUS at 23:22

## 2021-04-23 RX ADMIN — Medication 50 MICROGRAM(S): at 05:44

## 2021-04-23 RX ADMIN — BUMETANIDE 2 MILLIGRAM(S): 0.25 INJECTION INTRAMUSCULAR; INTRAVENOUS at 17:25

## 2021-04-23 RX ADMIN — SPIRONOLACTONE 12.5 MILLIGRAM(S): 25 TABLET, FILM COATED ORAL at 05:44

## 2021-04-23 RX ADMIN — Medication 10 UNIT(S): at 09:18

## 2021-04-23 RX ADMIN — HEPARIN SODIUM 5000 UNIT(S): 5000 INJECTION INTRAVENOUS; SUBCUTANEOUS at 05:44

## 2021-04-23 RX ADMIN — INSULIN GLARGINE 20 UNIT(S): 100 INJECTION, SOLUTION SUBCUTANEOUS at 22:26

## 2021-04-23 RX ADMIN — Medication 3 MILLIGRAM(S): at 22:26

## 2021-04-23 RX ADMIN — Medication 100 MILLIGRAM(S): at 09:15

## 2021-04-23 RX ADMIN — Medication 10 UNIT(S): at 17:55

## 2021-04-23 RX ADMIN — Medication 81 MILLIGRAM(S): at 09:15

## 2021-04-23 RX ADMIN — Medication 10 UNIT(S): at 12:40

## 2021-04-23 RX ADMIN — BUMETANIDE 2 MILLIGRAM(S): 0.25 INJECTION INTRAMUSCULAR; INTRAVENOUS at 05:45

## 2021-04-23 RX ADMIN — ATORVASTATIN CALCIUM 80 MILLIGRAM(S): 80 TABLET, FILM COATED ORAL at 22:27

## 2021-04-23 RX ADMIN — ESCITALOPRAM OXALATE 5 MILLIGRAM(S): 10 TABLET, FILM COATED ORAL at 09:20

## 2021-04-23 RX ADMIN — CLOPIDOGREL BISULFATE 75 MILLIGRAM(S): 75 TABLET, FILM COATED ORAL at 09:14

## 2021-04-23 NOTE — HOSPICE CARE NOTE - CONVESATION DETAILS
Pt Referral received for Inpatient Hospice care. The Pt does not currently meet [facility] Inpatient criteria.    TC w/the Pt's Son-in-law Mohsin Zaman. Discussed the Pt's current medical status, and LARRY was informed that the Pt is not currently Inpatient appropriate.  As per EMR, Pt has not lost her oral route, and is A+Ox4.     HCN RN will continue to follow, and re-eval on Monday. LARRY is in agreement.  Lisa Kauffman RN  [phone] Pt Referral received for Inpatient Hospice care. The Pt does not currently meet [facility] Inpatient criteria.  As per EMR - Pt w/noted anxiety and SOB, but has not lost her oral route, and is A+Ox4.     TC w/the Pt's Son-in-law Mohsin Zaman. Discussed the Pt's current medical status, and LARRY was informed that the Pt is not currently Inpatient appropriate.  As per EMR, LARRY verbalized understanding.     HCN RN will continue to follow, and re-eval on Monday. LARRY is in agreement.  Lisa Kauffman RN  [phone]

## 2021-04-23 NOTE — PROGRESS NOTE ADULT - PROBLEM SELECTOR PLAN 2
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 MERVIN on CKD stage 4. CKD 2/2 recurrent AKIs, DM, HTN c/b metabolic acidosis on bicarb 650 QD  - Cr was ~1.5 on admission, uptrending with diuresis on bumex 2mg IV BID. pt continues to be dyspneic though euvolemic on exam.   - will augment with metolazone as net neg 360 with bumex and trend Cr. MERVIN on CKD stage 4. 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 pt continues to be dyspneic though euvolemic on exam. Will check CXR

## 2021-04-23 NOTE — PROGRESS NOTE ADULT - PROBLEM SELECTOR PLAN 2
HF Rx as above.   Will continue Dilaudid 0.2 mg IV q 3 PRN and will add Dilaudid 0.2mg IV q 8 ATC.   Will likely need close monitoring (inpatient hospice or PCU) in order to adjust symptomatic Rx.

## 2021-04-23 NOTE — PROGRESS NOTE ADULT - ASSESSMENT
75y.o F Albanian speaking h/o DM, CAD s/p CABG 2014 and prior PCI, severe mitral regurgitation (s/p mitral clip 2019), pulmonary HTN (TTE 2019), HF (EF 21 % June 2019), CKD IV not on dialysis (b/l SCr 2-2.2), admitted for tachypnea 2/2 acute on chronic HFrEF exacerbation. Currently on IV Bumex. Palliative care was called for GOC and ACP.

## 2021-04-23 NOTE — PROGRESS NOTE ADULT - SUBJECTIVE AND OBJECTIVE BOX
Adrianna Eidathy PGY1    Patient is a 75y old  Female who presents with a chief complaint of dyspnea (22 Apr 2021 16:50)      SUBJECTIVE / OVERNIGHT EVENTS:  - overnight - no events  - am -     MEDICATIONS  (STANDING):  aspirin enteric coated 81 milliGRAM(s) Oral daily  atorvastatin 80 milliGRAM(s) Oral at bedtime  buMETAnide Injectable 2 milliGRAM(s) IV Push two times a day  clopidogrel Tablet 75 milliGRAM(s) Oral daily  dextrose 5%. 1000 milliLiter(s) (50 mL/Hr) IV Continuous <Continuous>  escitalopram 5 milliGRAM(s) Oral daily  heparin   Injectable 5000 Unit(s) SubCutaneous every 8 hours  hydrALAZINE 37.5 milliGRAM(s) Oral three times a day  insulin glargine Injectable (LANTUS) 20 Unit(s) SubCutaneous at bedtime  insulin lispro (ADMELOG) corrective regimen sliding scale   SubCutaneous at bedtime  insulin lispro (ADMELOG) corrective regimen sliding scale   SubCutaneous three times a day before meals  insulin lispro Injectable (ADMELOG) 10 Unit(s) SubCutaneous three times a day before meals  levothyroxine 50 MICROGram(s) Oral daily  melatonin 3 milliGRAM(s) Oral at bedtime  metoprolol succinate  milliGRAM(s) Oral daily  pantoprazole    Tablet 40 milliGRAM(s) Oral before breakfast  senna 2 Tablet(s) Oral at bedtime  sodium bicarbonate 650 milliGRAM(s) Oral daily  spironolactone 12.5 milliGRAM(s) Oral daily    MEDICATIONS  (PRN):  glycopyrrolate Injectable 0.4 milliGRAM(s) IV Push every 6 hours PRN Secretions.  HYDROmorphone  Injectable 0.2 milliGRAM(s) IV Push every 3 hours PRN Moderate to Severe pain  HYDROmorphone  Injectable 0.2 milliGRAM(s) IV Push every 3 hours PRN Dyspnea, labored breathing or RR > 28  LORazepam   Injectable 0.2 milliGRAM(s) IV Push every 8 hours PRN Anxiety or intractable respiratory distress.  polyethylene glycol 3350 17 Gram(s) Oral two times a day PRN Constipation      CAPILLARY BLOOD GLUCOSE      POCT Blood Glucose.: 149 mg/dL (22 Apr 2021 21:18)  POCT Blood Glucose.: 86 mg/dL (22 Apr 2021 17:28)  POCT Blood Glucose.: 198 mg/dL (22 Apr 2021 13:19)  POCT Blood Glucose.: 187 mg/dL (22 Apr 2021 09:09)    I&O's Summary    22 Apr 2021 07:01  -  23 Apr 2021 07:00  --------------------------------------------------------  IN: 240 mL / OUT: 600 mL / NET: -360 mL        Vital Signs Last 24 Hrs  T(C): 36.4 (23 Apr 2021 04:13), Max: 36.6 (22 Apr 2021 12:33)  T(F): 97.6 (23 Apr 2021 04:13), Max: 97.9 (22 Apr 2021 12:33)  HR: 74 (23 Apr 2021 04:13) (71 - 84)  BP: 98/56 (23 Apr 2021 04:13) (94/58 - 107/67)  BP(mean): --  RR: 18 (23 Apr 2021 04:13) (18 - 20)  SpO2: 97% (23 Apr 2021 04:13) (96% - 97%)    PHYSICAL EXAM:  GENERAL: frail elderly lady, NAD    HEENT: EOMI   RESPIRATORY: crackles bilateral basilar, no wheeze on NC  CARDIOVASCULAR: regular rate and rhythm, sinus on telemetry  ABDOMEN: Nontender to palpation, normoactive bowel sounds  MUSCULOSKELETAL: able to move all extremities  NEURO: Non-focal, no tremors  PSYCH: anxious-appearing   EXT: no peripheral edema  SKIN: intact       LABS:                        8.4    10.24 )-----------( 368      ( 22 Apr 2021 07:19 )             29.3      04-22    138  |  102  |  68<H>  ----------------------------<  165<H>  4.3   |  21<L>  |  1.87<H>    Ca    9.0      22 Apr 2021 07:07  Phos  3.5     04-22  Mg     2.2     04-22                RADIOLOGY & ADDITIONAL TESTS:    Imaging Personally Reviewed:    Consultant(s) Notes Reviewed:      Care Discussed with Consultants/Other Providers:   Adrianna Eidathy PGY1    Patient is a 75y old  Female who presents with a chief complaint of dyspnea (22 Apr 2021 16:50)      SUBJECTIVE / OVERNIGHT EVENTS:  - overnight - no events  - am - pt is dyspneic, had bm this am. denies chest pain. 5 beats vtach this am.     MEDICATIONS  (STANDING):  aspirin enteric coated 81 milliGRAM(s) Oral daily  atorvastatin 80 milliGRAM(s) Oral at bedtime  buMETAnide Injectable 2 milliGRAM(s) IV Push two times a day  clopidogrel Tablet 75 milliGRAM(s) Oral daily  dextrose 5%. 1000 milliLiter(s) (50 mL/Hr) IV Continuous <Continuous>  escitalopram 5 milliGRAM(s) Oral daily  heparin   Injectable 5000 Unit(s) SubCutaneous every 8 hours  hydrALAZINE 37.5 milliGRAM(s) Oral three times a day  insulin glargine Injectable (LANTUS) 20 Unit(s) SubCutaneous at bedtime  insulin lispro (ADMELOG) corrective regimen sliding scale   SubCutaneous at bedtime  insulin lispro (ADMELOG) corrective regimen sliding scale   SubCutaneous three times a day before meals  insulin lispro Injectable (ADMELOG) 10 Unit(s) SubCutaneous three times a day before meals  levothyroxine 50 MICROGram(s) Oral daily  melatonin 3 milliGRAM(s) Oral at bedtime  metoprolol succinate  milliGRAM(s) Oral daily  pantoprazole    Tablet 40 milliGRAM(s) Oral before breakfast  senna 2 Tablet(s) Oral at bedtime  sodium bicarbonate 650 milliGRAM(s) Oral daily  spironolactone 12.5 milliGRAM(s) Oral daily    MEDICATIONS  (PRN):  glycopyrrolate Injectable 0.4 milliGRAM(s) IV Push every 6 hours PRN Secretions.  HYDROmorphone  Injectable 0.2 milliGRAM(s) IV Push every 3 hours PRN Moderate to Severe pain  HYDROmorphone  Injectable 0.2 milliGRAM(s) IV Push every 3 hours PRN Dyspnea, labored breathing or RR > 28  LORazepam   Injectable 0.2 milliGRAM(s) IV Push every 8 hours PRN Anxiety or intractable respiratory distress.  polyethylene glycol 3350 17 Gram(s) Oral two times a day PRN Constipation      CAPILLARY BLOOD GLUCOSE      POCT Blood Glucose.: 149 mg/dL (22 Apr 2021 21:18)  POCT Blood Glucose.: 86 mg/dL (22 Apr 2021 17:28)  POCT Blood Glucose.: 198 mg/dL (22 Apr 2021 13:19)  POCT Blood Glucose.: 187 mg/dL (22 Apr 2021 09:09)    I&O's Summary    22 Apr 2021 07:01  -  23 Apr 2021 07:00  --------------------------------------------------------  IN: 240 mL / OUT: 600 mL / NET: -360 mL        Vital Signs Last 24 Hrs  T(C): 36.4 (23 Apr 2021 04:13), Max: 36.6 (22 Apr 2021 12:33)  T(F): 97.6 (23 Apr 2021 04:13), Max: 97.9 (22 Apr 2021 12:33)  HR: 74 (23 Apr 2021 04:13) (71 - 84)  BP: 98/56 (23 Apr 2021 04:13) (94/58 - 107/67)  BP(mean): --  RR: 18 (23 Apr 2021 04:13) (18 - 20)  SpO2: 97% (23 Apr 2021 04:13) (96% - 97%)    PHYSICAL EXAM:  GENERAL: frail elderly lady, dyspneic at rest, +orthopnea  HEENT: EOMI   RESPIRATORY: no crackles anteriorly bilaterally  CARDIOVASCULAR: regular rate and rhythm, sinus on telemetry. no edema bilateral LE.   ABDOMEN: Nontender to palpation, normoactive bowel sounds  MUSCULOSKELETAL: able to move all extremities  NEURO: Non-focal, no tremors  PSYCH: anxious-appearing       LABS:                        8.4    10.24 )-----------( 368      ( 22 Apr 2021 07:19 )             29.3      04-22    138  |  102  |  68<H>  ----------------------------<  165<H>  4.3   |  21<L>  |  1.87<H>    Ca    9.0      22 Apr 2021 07:07  Phos  3.5     04-22  Mg     2.2     04-22                RADIOLOGY & ADDITIONAL TESTS:    Imaging Personally Reviewed:    Consultant(s) Notes Reviewed:      Care Discussed with Consultants/Other Providers:   Adrianna Eidathy PGY1    Patient is a 75y old  Female who presents with a chief complaint of dyspnea (22 Apr 2021 16:50)      SUBJECTIVE / OVERNIGHT EVENTS:  - overnight - no events  - am - pt is dyspneic, had bm this am. denies chest pain. 5 beats vtach this am.   updated family, pending hospice placement.     MEDICATIONS  (STANDING):  aspirin enteric coated 81 milliGRAM(s) Oral daily  atorvastatin 80 milliGRAM(s) Oral at bedtime  buMETAnide Injectable 2 milliGRAM(s) IV Push two times a day  clopidogrel Tablet 75 milliGRAM(s) Oral daily  dextrose 5%. 1000 milliLiter(s) (50 mL/Hr) IV Continuous <Continuous>  escitalopram 5 milliGRAM(s) Oral daily  heparin   Injectable 5000 Unit(s) SubCutaneous every 8 hours  hydrALAZINE 37.5 milliGRAM(s) Oral three times a day  insulin glargine Injectable (LANTUS) 20 Unit(s) SubCutaneous at bedtime  insulin lispro (ADMELOG) corrective regimen sliding scale   SubCutaneous at bedtime  insulin lispro (ADMELOG) corrective regimen sliding scale   SubCutaneous three times a day before meals  insulin lispro Injectable (ADMELOG) 10 Unit(s) SubCutaneous three times a day before meals  levothyroxine 50 MICROGram(s) Oral daily  melatonin 3 milliGRAM(s) Oral at bedtime  metoprolol succinate  milliGRAM(s) Oral daily  pantoprazole    Tablet 40 milliGRAM(s) Oral before breakfast  senna 2 Tablet(s) Oral at bedtime  sodium bicarbonate 650 milliGRAM(s) Oral daily  spironolactone 12.5 milliGRAM(s) Oral daily    MEDICATIONS  (PRN):  glycopyrrolate Injectable 0.4 milliGRAM(s) IV Push every 6 hours PRN Secretions.  HYDROmorphone  Injectable 0.2 milliGRAM(s) IV Push every 3 hours PRN Moderate to Severe pain  HYDROmorphone  Injectable 0.2 milliGRAM(s) IV Push every 3 hours PRN Dyspnea, labored breathing or RR > 28  LORazepam   Injectable 0.2 milliGRAM(s) IV Push every 8 hours PRN Anxiety or intractable respiratory distress.  polyethylene glycol 3350 17 Gram(s) Oral two times a day PRN Constipation      CAPILLARY BLOOD GLUCOSE      POCT Blood Glucose.: 149 mg/dL (22 Apr 2021 21:18)  POCT Blood Glucose.: 86 mg/dL (22 Apr 2021 17:28)  POCT Blood Glucose.: 198 mg/dL (22 Apr 2021 13:19)  POCT Blood Glucose.: 187 mg/dL (22 Apr 2021 09:09)    I&O's Summary    22 Apr 2021 07:01  -  23 Apr 2021 07:00  --------------------------------------------------------  IN: 240 mL / OUT: 600 mL / NET: -360 mL        Vital Signs Last 24 Hrs  T(C): 36.4 (23 Apr 2021 04:13), Max: 36.6 (22 Apr 2021 12:33)  T(F): 97.6 (23 Apr 2021 04:13), Max: 97.9 (22 Apr 2021 12:33)  HR: 74 (23 Apr 2021 04:13) (71 - 84)  BP: 98/56 (23 Apr 2021 04:13) (94/58 - 107/67)  BP(mean): --  RR: 18 (23 Apr 2021 04:13) (18 - 20)  SpO2: 97% (23 Apr 2021 04:13) (96% - 97%)    PHYSICAL EXAM:  GENERAL: frail elderly lady, dyspneic at rest, +orthopnea  HEENT: EOMI   RESPIRATORY: no crackles anteriorly bilaterally  CARDIOVASCULAR: regular rate and rhythm, sinus on telemetry. no edema bilateral LE.   ABDOMEN: Nontender to palpation, normoactive bowel sounds  MUSCULOSKELETAL: able to move all extremities  NEURO: Non-focal, no tremors  PSYCH: anxious-appearing       LABS:                        8.4    10.24 )-----------( 368      ( 22 Apr 2021 07:19 )             29.3      04-22    138  |  102  |  68<H>  ----------------------------<  165<H>  4.3   |  21<L>  |  1.87<H>    Ca    9.0      22 Apr 2021 07:07  Phos  3.5     04-22  Mg     2.2     04-22                RADIOLOGY & ADDITIONAL TESTS:    Imaging Personally Reviewed:    Consultant(s) Notes Reviewed:      Care Discussed with Consultants/Other Providers:

## 2021-04-23 NOTE — PROGRESS NOTE ADULT - PROBLEM SELECTOR PLAN 8
Planning for inpatient hospice.       Pepe Stevens MD   Geriatrics and Palliative Care (GAP) Consult Service    of Geriatric and Palliative Medicine  [facility]      Please page the following number for clinical matters between the hours of 9 am and 5 pm from Monday through Friday : [phone]    After 5pm and on weekends, please see the contact information below:    In the event of newly developing, evolving, or worsening symptoms, please contact the Palliative Medicine team via pager (if the patient is at [facility] #[phone] or if the patient is at [facility] #[phone]) The Geriatric and Palliative Medicine service has coverage 24 hours a day/ 7 days a week to provide medical recommendations regarding symptom management needs via telephone

## 2021-04-23 NOTE — PROGRESS NOTE ADULT - SUBJECTIVE AND OBJECTIVE BOX
S: No distress, Denies SOB or chest pain. Remains on 2L NC. Review of systems otherwise (-)    Review of Systems:   Constitutional: [ ] fevers, [ ] chills.   Skin: [ ] dry skin. [ ] rashes.  Psychiatric: [ ] depression, [ ] anxiety.   Gastrointestinal: [ ] BRBPR, [ ] melena.   Neurological: [ ] confusion. [ ] seizures. [ ] shuffling gait.   Ears,Nose,Mouth and Throat: [ ] ear pain [ ] sore throat.   Eyes: [ ] diplopia.   Respiratory: [ ] hemoptysis. [ ] shortness of breath  Cardiovascular: See HPI above  Hematologic/Lymphatic: [ ] anemia. [ ] painful nodes. [ ] prolonged bleeding.   Genitourinary: [ ] hematuria. [ ] flank pain.   Endocrine: [ ] significant change in weight. [ ] intolerance to heat and cold.     Review of systems [x ] otherwise negative, [ ] otherwise unable to obtain    FH: no family history of sudden cardiac death in first degree relatives    SH: [ ] tobacco, [ ] alcohol, [ ] drugs      MEDICATIONS  (STANDING):  aspirin enteric coated 81 milliGRAM(s) Oral daily  atorvastatin 80 milliGRAM(s) Oral at bedtime  buMETAnide 2 milliGRAM(s) Oral two times a day  clopidogrel Tablet 75 milliGRAM(s) Oral daily  dextrose 5%. 1000 milliLiter(s) (50 mL/Hr) IV Continuous <Continuous>  escitalopram 5 milliGRAM(s) Oral daily  heparin   Injectable 5000 Unit(s) SubCutaneous every 8 hours  hydrALAZINE 37.5 milliGRAM(s) Oral three times a day  insulin glargine Injectable (LANTUS) 20 Unit(s) SubCutaneous at bedtime  insulin lispro (ADMELOG) corrective regimen sliding scale   SubCutaneous three times a day before meals  insulin lispro (ADMELOG) corrective regimen sliding scale   SubCutaneous at bedtime  insulin lispro Injectable (ADMELOG) 10 Unit(s) SubCutaneous three times a day before meals  levothyroxine 50 MICROGram(s) Oral daily  melatonin 3 milliGRAM(s) Oral at bedtime  metoprolol succinate  milliGRAM(s) Oral daily  pantoprazole    Tablet 40 milliGRAM(s) Oral before breakfast  senna 2 Tablet(s) Oral at bedtime  sodium bicarbonate 650 milliGRAM(s) Oral daily  spironolactone 12.5 milliGRAM(s) Oral daily    MEDICATIONS  (PRN):  glycopyrrolate Injectable 0.4 milliGRAM(s) IV Push every 6 hours PRN Secretions.  HYDROmorphone  Injectable 0.2 milliGRAM(s) IV Push every 3 hours PRN Moderate to Severe pain  HYDROmorphone  Injectable 0.2 milliGRAM(s) IV Push every 3 hours PRN Dyspnea, labored breathing or RR > 28  LORazepam   Injectable 0.2 milliGRAM(s) IV Push every 8 hours PRN Anxiety or intractable respiratory distress.  polyethylene glycol 3350 17 Gram(s) Oral two times a day PRN Constipation      LABS:                          8.0    9.61  )-----------( 348      ( 23 Apr 2021 07:17 )             27.3     Hemoglobin: 8.0 g/dL (04-23 @ 07:17)  Hemoglobin: 8.4 g/dL (04-22 @ 07:19)  Hemoglobin: 8.5 g/dL (04-21 @ 06:36)  Hemoglobin: 8.4 g/dL (04-20 @ 07:18)  Hemoglobin: 8.8 g/dL (04-19 @ 01:01)    04-23    136  |  100  |  79<H>  ----------------------------<  128<H>  4.4   |  22  |  2.20<H>    Ca    9.0      23 Apr 2021 07:16  Phos  4.1     04-23  Mg     2.3     04-23      Creatinine Trend: 2.20<--, 1.87<--, 1.88<--, 1.80<--, 1.69<--, 1.71<--             04-22-21 @ 07:01  -  04-23-21 @ 07:00  --------------------------------------------------------  IN: 240 mL / OUT: 600 mL / NET: -360 mL    04-23-21 @ 07:01  -  04-23-21 @ 10:58  --------------------------------------------------------  IN: 120 mL / OUT: 0 mL / NET: 120 mL        PHYSICAL EXAM  Vital Signs Last 24 Hrs  T(C): 36.4 (23 Apr 2021 04:13), Max: 36.6 (22 Apr 2021 12:33)  T(F): 97.6 (23 Apr 2021 04:13), Max: 97.9 (22 Apr 2021 12:33)  HR: 84 (23 Apr 2021 09:10) (71 - 84)  BP: 111/70 (23 Apr 2021 09:10) (94/58 - 111/70)  BP(mean): --  RR: 18 (23 Apr 2021 04:13) (18 - 20)  SpO2: 99% (23 Apr 2021 09:10) (96% - 99%)      General: Well nourished in no acute distress. Alert and Oriented * 3.   Head: Normocephalic and atraumatic.   Neck: No JVD. No bruits. Supple. Does not appear to be enlarged.   Cardiovascular: + S1,S2 ; RRR Soft systolic murmur at the left lower sternal border. No rubs noted.    Lungs: CTA b/l. No rhonchi, rales or wheezes.   Abdomen: + BS, soft. Non tender. Non distended. No rebound. No guarding.   Extremities: No clubbing/cyanosis/edema.   Neurologic: Moves all four extremities. Full range of motion.   Skin: Warm and moist. The patient's skin has normal elasticity and good skin turgor.   Psychiatric: Appropriate mood and affect.  Musculoskeletal: Normal range of motion, normal strength    TELEMETRY: SR 70-80, NSVT    < from: TTE with Doppler (w/Cont) (04.19.21 @ 06:38) >  Conclusions:  1. Mitral clip seen. Mild mitral regurgitation.  Mean  transmitral valve gradient equals 5-6 mm Hg, which is  probably normal/milldy elevated  in the setting of a  Mitracip. HR 90s  2. Minimal aortic regurgitation.  3.  Mild left ventricular enlargement.  4. Endocardial visualization enhanced with intravenous  injection of Ultrasonic Enhancing Agent(Definity).  Severe  global left ventricular systolic dysfunction.  5. Right ventricular enlargement with decreased right  ventricular systolic function.  6. Normal tricuspid valve. Moderate-severe tricuspid  regurgitation.  7. Estimated pulmonary artery systolic pressure equals 57  mm Hg, assuming right atrial pressure equals 8 mm Hg,  consistent with moderate pulmonary pressures.    < end of copied text >      ASSESSMENT/PLAN: 	75y.o F Mongolian speaking h/o HTN, HLD, DM, CAD s/p CABG 2014 and prior PCI, severe mitral regurgitation (s/p mitral clip 2019), pulmonary HTN (TTE 2019), HF (EF 21 % June 2019), CKD IV not on dialysis (b/l SCr 2-2.2), hypothyroidism admitted for tachypnea 2/2 acute on chronic HFrEF exacerbation.     - Volume status improved, cr increasing - agree with transition to PO Bumex 2mg BID  - Wean O2 as tolerated - now on 2L NC  - Continue hydralazine/Toprol XL  - dapt/statin for prior stents   - Monitor creatine/lytes  - Anxiety treatment prn per med  - TTE noted - EF 25-30%, decreased RV function, mod-severe TR, mod pulm HTN  - No further inpatient cardiac w/u planned  - Palliative eval appreciated - planning for inpatient hospice    Amauri Hill PA-C  Pager: [phone]

## 2021-04-23 NOTE — PROGRESS NOTE ADULT - ASSESSMENT
75y.o F Welsh speaking h/o HTN, HLD, DM, CAD s/p CABG 2014 and prior PCI, severe mitral regurgitation (s/p mitral clip 2019), pulmonary HTN (TTE 2019), HF (EF 21 % June 2019), CKD IV not on dialysis (b/l SCr 2-2.2), hypothyroidism comes to the Ed with 7 days of SOB. Per chart review, patient recently admitted for hypoxic respiratory failure 2/2 acute on chronic HFrEF exacerbation in the setting of mitral stenosis c/b MERVIN on CKD.  Pt initially treated w/ IV diuresis but was started on milrinone drip after RHC showed persistent elevated filled pressures. She received IV diuresis, milrinone continued dyspnea despite clinical euvolemic status.  PRN xanax started for possible anxiety component.  Labs w/ intermittent elevations in WBC in the setting of asymptomatic bacteruria    MERVIN on CKD stage 4  - baseline Cr 1.9-2.2; has had recurrent MERVIN's over the years  - CKD is likely 2/2 recurrent MERVIN's + underlying DM/HTN  - Renal function slightly elevated. Noted episodes of hypotension likely MERVIN from hypotension   - Continue Diuretics per cardiology. now on oral   - Avoid hypotension   - Avoid nephrotoxins including NSAIDs, IV contrast (if possible), [employer] products  - monitor I/O's accurately    Acidosis  serum bicarb at goal for CKD   Continue sodium bicarb daily  MOnitor serum CO2    HTN  BP controlled  avoid hypotension  Low salt diet   MOnitor BP    SOB  likely sec to CHF  Follow up Cardiology  Monitor daily weights     Hypokalemia  sec to diuretics  improved   MOnitor serm K

## 2021-04-23 NOTE — PROGRESS NOTE ADULT - ASSESSMENT
75y.o F Syriac speaking h/o DM, CAD s/p CABG 2014 and prior PCI, severe mitral regurgitation (s/p mitral clip 2019), pulmonary HTN (TTE 2019), HF (EF 21 % June 2019), CKD IV not on dialysis (b/l SCr 2-2.2), admitted for CHF exacerbation, on IV bumex  admitted for tachypnea 2/2 acute on chronic HFrEF exacerbation.

## 2021-04-23 NOTE — PROGRESS NOTE ADULT - PROBLEM SELECTOR PLAN 1
- admitted for acute on chronic systolic HF exacerbation (tachypneic to 30s, on non-rebreather in ED, needing bipap on floors for resp distress, now on NC). CXR on admission with pulm edema, BNP 6000. Less likely ACS (trop 49-> 42)  - last HF admission 1/2021, requiring milrinone assisted diuresis.   - continue on 2mg IV bumex BID with net neg 1.6L over last 24h. continue  - continue home dose: hydral 37.5 TID, metoprolol succinate 100mg QD. started on spironolactone 12.5mg QD this admission.   - TTE 4/19: EF 25-30%, mild MR with clip, severe global LV dysfunction, decreased RV dysfunction, mod-severe TR, mod pulm pressures.   - Cr ~1.5 (baseline 2).  - daily weights, strict in/out  - HF following, f/u recs  - palliative consulted per family request for multiple HF exacerbation admissions. family is undecided regarding dispo, state they are overwhelmed with caring for her at home but do not want nursing home and wish to speak with palliative. leaning toward inpatient hospice. - admitted for acute on chronic systolic HF exacerbation (tachypneic to 30s, on non-rebreather in ED, needing bipap on floors for resp distress, now on NC). CXR on admission with pulm edema, BNP 6000. Less likely ACS (trop 49-> 42)  - last HF admission 1/2021, requiring milrinone assisted diuresis.   - continue on 2mg IV bumex BID with net neg 1.6L over last 24h. continue  - continue home dose: hydral 37.5 TID, metoprolol succinate 100mg QD. started on spironolactone 12.5mg QD this admission.   - TTE 4/19: EF 25-30%, mild MR with clip, severe global LV dysfunction, decreased RV dysfunction, mod-severe TR, mod pulm pressures.   - Cr ~2.2 (baseline 2)  - daily weights, strict in/out  - HF following, f/u recs  - palliative consulted per family request for multiple HF exacerbation admissions. family has decided on inpt hospice but continuing cardiac meds. palliative has sent hospice referral - f/u. - admitted for acute on chronic systolic HF exacerbation (tachypneic to 30s, on non-rebreather in ED, needing bipap on floors for resp distress, now on NC). CXR on admission with pulm edema, BNP 6000. Less likely ACS (trop 49-> 42)  - last HF admission 1/2021, requiring milrinone assisted diuresis.   - Will transition to PO diuretics today  - continue home dose: hydral 37.5 TID, metoprolol succinate 100mg QD. started on spironolactone 12.5mg QD this admission.   - TTE 4/19: EF 25-30%, mild MR with clip, severe global LV dysfunction, decreased RV dysfunction, mod-severe TR, mod pulm pressures.   - Cr ~2.2 (baseline 2)  - daily weights, strict in/out  - HF following, f/u recs  - palliative consulted per family request for multiple HF exacerbation admissions. family has decided on inpt hospice but continuing cardiac meds. palliative has sent hospice referral - f/u. - admitted for acute on chronic systolic HF exacerbation (tachypneic to 30s, on non-rebreather in ED, needing bipap on floors for resp distress, now on NC). CXR on admission with pulm edema, BNP 6000. Less likely ACS (trop 49-> 42)  - last HF admission 1/2021, requiring milrinone assisted diuresis.   - Will transition to PO diuretics today (bumex 2mg BID). check CXR to evaluate pulm edema improvement. if not improved, add metolazone today.   - continue home dose: hydral 37.5 TID, metoprolol succinate 100mg QD. started on spironolactone 12.5mg QD this admission.   - TTE 4/19: EF 25-30%, mild MR with clip, severe global LV dysfunction, decreased RV dysfunction, mod-severe TR, mod pulm pressures.   - Cr ~2.2 (baseline 2)  - daily weights, strict in/out  - HF following, f/u recs  - palliative consulted per family request for multiple HF exacerbation admissions. family has decided on inpt hospice but continuing cardiac meds. palliative has sent hospice referral - f/u.

## 2021-04-23 NOTE — PROGRESS NOTE ADULT - ATTENDING COMMENTS
74yo F Italian speaking h/o HTN, HLD, DM, CAD s/p CABG 2014 and prior PCI, severe mitral regurgitation (s/p mitral clip 2019), pulmonary HTN (TTE 2019), HF (EF 21 % June 2019), CKD IV not on dialysis (b/l SCr 2-2.2), hypothyroidism admitted for acute hypoxic respiratory failure and acute on chronic HFrEF exacerbation. Volume status improving. Will transition to PO Bumex today. On 2L NC. Monitor I/O and daily weights. Wean O2 as tolerated. Zoloft started for anxiety. Palliative consulted given family request, family wanting inpatient hospice. Hospice referral made. Renal function remains slightly increased today, Monitor BMP. Nephro following

## 2021-04-23 NOTE — PROGRESS NOTE ADULT - SUBJECTIVE AND OBJECTIVE BOX
[facility]   MD Dion Dasilva MD, D.O. Ruoru Wong, PA    From 7 AM - 5 PM:  OFFICE: [phone]  Dr. Muhammad cell: [phone]  Dr. Montero cell: [phone]  Dr. Soria cell: [phone]  RASHIDA Smith cell: [phone]    From 5 PM - 7 AM: Answering Service: [phone]  Date of service: 04-23-21 @ 15:25    RENAL FOLLOW UP NOTE  --------------------------------------------------------------------------------  HPI:  Pt seen and examined at bedside.   Denies SOB, chest pain     PAST HISTORY  --------------------------------------------------------------------------------  No significant changes to PMH, PSH, FHx, SHx, unless otherwise noted    ALLERGIES & MEDICATIONS  --------------------------------------------------------------------------------  Allergies    azithromycin (Hives; Pruritus)    Intolerances      Standing Inpatient Medications  aspirin enteric coated 81 milliGRAM(s) Oral daily  atorvastatin 80 milliGRAM(s) Oral at bedtime  buMETAnide 2 milliGRAM(s) Oral two times a day  clopidogrel Tablet 75 milliGRAM(s) Oral daily  dextrose 5%. 1000 milliLiter(s) IV Continuous <Continuous>  escitalopram 5 milliGRAM(s) Oral daily  heparin   Injectable 5000 Unit(s) SubCutaneous every 8 hours  hydrALAZINE 37.5 milliGRAM(s) Oral three times a day  HYDROmorphone  Injectable 0.2 milliGRAM(s) IV Push every 8 hours  insulin glargine Injectable (LANTUS) 20 Unit(s) SubCutaneous at bedtime  insulin lispro (ADMELOG) corrective regimen sliding scale   SubCutaneous three times a day before meals  insulin lispro (ADMELOG) corrective regimen sliding scale   SubCutaneous at bedtime  insulin lispro Injectable (ADMELOG) 10 Unit(s) SubCutaneous three times a day before meals  levothyroxine 50 MICROGram(s) Oral daily  melatonin 3 milliGRAM(s) Oral at bedtime  metoprolol succinate  milliGRAM(s) Oral daily  pantoprazole    Tablet 40 milliGRAM(s) Oral before breakfast  senna 2 Tablet(s) Oral at bedtime  sodium bicarbonate 650 milliGRAM(s) Oral daily  spironolactone 12.5 milliGRAM(s) Oral daily    PRN Inpatient Medications  glycopyrrolate Injectable 0.4 milliGRAM(s) IV Push every 6 hours PRN  HYDROmorphone  Injectable 0.2 milliGRAM(s) IV Push every 3 hours PRN  HYDROmorphone  Injectable 0.2 milliGRAM(s) IV Push every 3 hours PRN  LORazepam   Injectable 0.2 milliGRAM(s) IV Push every 8 hours PRN  polyethylene glycol 3350 17 Gram(s) Oral two times a day PRN      REVIEW OF SYSTEMS  --------------------------------------------------------------------------------  General: no fever  CVS: no chest pain  RESP: no sob, no cough  ABD: no abdominal pain  : no dysuria,  MSK: no edema     VITALS/PHYSICAL EXAM  --------------------------------------------------------------------------------  T(C): 36.6 (04-23-21 @ 11:46), Max: 36.6 (04-23-21 @ 11:46)  HR: 70 (04-23-21 @ 13:22) (70 - 84)  BP: 92/54 (04-23-21 @ 14:13) (92/54 - 111/70)  RR: 18 (04-23-21 @ 11:46) (18 - 18)  SpO2: 97% (04-23-21 @ 11:46) (97% - 99%)  Wt(kg): --        04-22-21 @ 07:01  -  04-23-21 @ 07:00  --------------------------------------------------------  IN: 240 mL / OUT: 600 mL / NET: -360 mL    04-23-21 @ 07:01  -  04-23-21 @ 15:25  --------------------------------------------------------  IN: 240 mL / OUT: 0 mL / NET: 240 mL      Physical Exam:  	Gen: NAD  	HEENT: MMM  	Pulm: CTA B/L  	CV: S1S2  	Abd: Soft, +BS  	Ext: No LE edema B/L                      Neuro: Awake   	Skin: Warm and Dry       LABS/STUDIES  --------------------------------------------------------------------------------              8.0    9.61  >-----------<  348      [04-23-21 @ 07:17]              27.3     136  |  100  |  79  ----------------------------<  128      [04-23-21 @ 07:16]  4.4   |  22  |  2.20        Ca     9.0     [04-23-21 @ 07:16]      Mg     2.3     [04-23-21 @ 07:16]      Phos  4.1     [04-23-21 @ 07:16]      Creatinine Trend:  SCr 2.20 [04-23 @ 07:16]  SCr 1.87 [04-22 @ 07:07]  SCr 1.88 [04-21 @ 21:21]  SCr 1.80 [04-21 @ 06:34]  SCr 1.69 [04-20 @ 07:18]        Ferritin 111      [01-13-21 @ 01:42]  HbA1c 7.5      [10-08-19 @ 14:30]  TSH 1.94      [04-17-21 @ 02:28]

## 2021-04-23 NOTE — PROGRESS NOTE ADULT - SUBJECTIVE AND OBJECTIVE BOX
Sami  ID# [id number]  HPI:  75y.o F Sami speaking h/o DM, CAD s/p CABG 2014 and prior PCI, severe mitral regurgitation (s/p mitral clip 2019), pulmonary HTN (TTE 2019), HF (EF 21 % June 2019), CKD IV not on dialysis (b/l SCr 2-2.2), admitted for tachypnea 2/2 acute on chronic HFrEF exacerbation. Currently on IV Bumex. Palliative care was called for GOC and ACP.     4/20. The patient was lethargic and able to nod to simple questions. She denied pain or dyspnea. However, she was not able to follow up a conversation and was mostly non verbal.   4/21. The patient was still lethargic but less compared with yesterday. She was able to denied pain or dyspnea, though she was still having labored breathing. She was not able to have a discussion about her illness or Rx options.   4/22. The patient was more alert but c/o Anxiety and being afraid. She appeared labored breathing. She denied pain   4/23. Though the patient was denying Anxiety and Dyspnea she appeared Anxious and labored breathing. Her daughter, by her bedside, agree and indicated the patient was likely symptomatic but not capable of expressing her current symptoms.   PERTINENT PM/SXH:   Diabetes mellitus type 2 in nonobese    Diabetes mellitus, type 2    Hyperlipidemia    Hypothyroidism    CAD (coronary artery disease)    Hypertension    GERD (gastroesophageal reflux disease)    Urinary tract infection    Heart failure    H/O pulmonary hypertension      S/P CABG (coronary artery bypass graft)    S/P mitral valve clip implantation      FAMILY HISTORY:  Family history of diabetes mellitus (Sibling)  brothers/sisters    Family history of hypertension (Sibling)  sister      ITEMS NOT CHECKED ARE NOT PRESENT    SOCIAL HISTORY:   Significant other/partner[x ] Single  Children[x ] Yes  Shinto/Spirituality: Other   Substance hx:  [ ]   Tobacco hx:  [ ]   Alcohol hx: [ ]   Home Opioid hx:  [ ] I-Stop Reference No:  Living Situation: [ x]Home  [ ]Long term care  [ ]Rehab [ ]Other  Call daughter Elsa she and   Mohsin  who is on contact information has  same number.  Spoke to pt son in law Mohsin . Introduced myself and give contact information  . discussed role of case management. medical plan of care and discharge plan .  Mohsin verbalized understanding . As per son in law Pt live with him  and pt  daughter in private house . there are  4 step to entrance and pt on main floor.  Pt has a walker and cane but  cannot use. unable to walk . assist x2 to radha  and transfer to  recliner near bed and to bathroom  which is not very away .   Has wheelchair for outdoor use.  Pt was recently d/c from [facility] and o2  concentrator and portable tank was delivered( cannot recall company name at  present. ) has glucometer and  daughter does finger stick. [facility] is [facility]. cdpap is other family member [facility]  [phone]/ [phone]  m-f  7hr . s-s 4hr.  Requesting increased in hours . interested in palliative if pt  meet criteria . do not want ltc in ngs home.   ADVANCE DIRECTIVES:    DNR  MOLST  [ ]  Living Will  [ ]   DECISION MAKER(s):  [x ] Health Care Proxy(s)  [ ] Surrogate(s)  [ ] Guardian           Name(s): Phone Number(s): See GOC note below.     BASELINE (I)ADL(s) (prior to admission):  [place]: [ ]Total  [ ] Moderate [ x]Dependent    Allergies    azithromycin (Hives; Pruritus)    Intolerances    MEDICATIONS  (STANDING):  aspirin enteric coated 81 milliGRAM(s) Oral daily  atorvastatin 80 milliGRAM(s) Oral at bedtime  buMETAnide 2 milliGRAM(s) Oral two times a day  clopidogrel Tablet 75 milliGRAM(s) Oral daily  dextrose 5%. 1000 milliLiter(s) (50 mL/Hr) IV Continuous <Continuous>  escitalopram 5 milliGRAM(s) Oral daily  heparin   Injectable 5000 Unit(s) SubCutaneous every 8 hours  hydrALAZINE 37.5 milliGRAM(s) Oral three times a day  HYDROmorphone  Injectable 0.2 milliGRAM(s) IV Push every 8 hours  insulin glargine Injectable (LANTUS) 20 Unit(s) SubCutaneous at bedtime  insulin lispro (ADMELOG) corrective regimen sliding scale   SubCutaneous three times a day before meals  insulin lispro (ADMELOG) corrective regimen sliding scale   SubCutaneous at bedtime  insulin lispro Injectable (ADMELOG) 10 Unit(s) SubCutaneous three times a day before meals  levothyroxine 50 MICROGram(s) Oral daily  melatonin 3 milliGRAM(s) Oral at bedtime  metoprolol succinate  milliGRAM(s) Oral daily  pantoprazole    Tablet 40 milliGRAM(s) Oral before breakfast  senna 2 Tablet(s) Oral at bedtime  sodium bicarbonate 650 milliGRAM(s) Oral daily  spironolactone 12.5 milliGRAM(s) Oral daily    MEDICATIONS  (PRN):  glycopyrrolate Injectable 0.4 milliGRAM(s) IV Push every 6 hours PRN Secretions.  HYDROmorphone  Injectable 0.2 milliGRAM(s) IV Push every 3 hours PRN Moderate to Severe pain  HYDROmorphone  Injectable 0.2 milliGRAM(s) IV Push every 3 hours PRN Dyspnea, labored breathing or RR > 28  LORazepam   Injectable 0.2 milliGRAM(s) IV Push every 8 hours PRN Anxiety or intractable respiratory distress.  polyethylene glycol 3350 17 Gram(s) Oral two times a day PRN Constipation    PRESENT SYMPTOMS: [x ]Unable to obtain due to poor mentation   Source if other than patient:  [ ]Family   [ ]Team     Pain: [ ]yes [x ]no  QOL impact -   Location -                    Aggravating factors -  Quality -  Radiation -  Timing-  Severity (0-10 scale):  Minimal acceptable level (0-10 scale):     CPOT:    https://www.[facility].org/getattachment/fgq61v51-1g3w-7v4g-2c3a-9941c4912s1f/Critical-Care-Pain-Observation-Tool-(CPOT)      PAIN AD Score: 0    http://geriatrictoolkit.[facility]/cog/painad.pdf (press ctrl +  left click to view)    Dyspnea:                           [ ]Mild [ ]Moderate [ ]Severe  Anxiety:                             [ ]Mild [ ]Moderate [ ]Severe  Fatigue:                             [ ]Mild [ ]Moderate [ ]Severe  Nausea:                             [ ]Mild [ ]Moderate [ ]Severe  Loss of appetite:              [ ]Mild [ ]Moderate [ ]Severe  Constipation:                    [ ]Mild [ ]Moderate [ ]Severe    Other Symptoms:  [ ]All other review of systems negative     Palliative Performance Status Version 2: 10-20      %    http://npcrc.org/files/news/palliative_performance_scale_ppsv2.pdf  PHYSICAL EXAM:  Vital Signs Last 24 Hrs  T(C): 36.6 (23 Apr 2021 11:46), Max: 36.6 (23 Apr 2021 11:46)  T(F): 97.8 (23 Apr 2021 11:46), Max: 97.8 (23 Apr 2021 11:46)  HR: 70 (23 Apr 2021 13:22) (70 - 84)  BP: 104/60 (23 Apr 2021 13:22) (94/58 - 111/70)  BP(mean): --  RR: 18 (23 Apr 2021 11:46) (18 - 18)  SpO2: 97% (23 Apr 2021 11:46) (97% - 99%)      GENERAL:  [ ]Alert  [ ]Oriented x   [x ]Lethargic but more alert than yesterday  [ ]Cachexia  [ ]Unarousable  [ ]Verbal  [x ] Mostly Non-Verbal  Behavioral:   [ ] Anxiety  [ ] Delirium [ ] Agitation [ ] Other  HEENT:  [ x]Normal   [ ]Dry mouth   [ ]ET Tube/Trach  [ ]Oral lesions  PULMONARY:   [ ]Clear [ ]Tachypnea  [ ]Audible excessive secretions   [ ]Rhonchi        [ ]Right [ ]Left [ ]Bilateral  [x ]Crackles        [ ]Right [ ]Left [ x]Bilateral  [ ]Wheezing     [ ]Right [ ]Left [ ]Bilateral  [ x]Diminished breath sounds [ ]right [ ]left [x ]bilateral  [x] labored breathing   CARDIOVASCULAR:    [x ]Regular [ ]Irregular [ ]Tachy  [ ]Ramesh [ ]Murmur [ ]Other  GASTROINTESTINAL:  [x ]Soft  [ ]Distended   [ x]+BS  [x ]Non tender [ ]Tender  [ ]PEG [ ]OGT/ NGT  Last BM: 4/22    GENITOURINARY:  [ ]Normal [ x] Incontinent   [ ]Oliguria/Anuria   [ ]Oscar  MUSCULOSKELETAL:   [ ]Normal   [ x]Weakness  [ ]Bed/Wheelchair bound [ ]Edema  NEUROLOGIC:   [ ]No focal deficits  [ ]Cognitive impairment  [ ]Dysphagia [ ]Dysarthria [ ]Paresis [x ]Other: Lethargic   SKIN:   [x ]Normal    [ ]Rash  [ ]Pressure ulcer(s)       Present on admission [ ]y [ ]n    CRITICAL CARE:  [ ] Shock Present  [ ]Septic [ ]Cardiogenic [ ]Neurologic [ ]Hypovolemic  [ ]  Vasopressors [ ]  Inotropes   [ ]Respiratory failure present [ ]Mechanical ventilation [ ]Non-invasive ventilatory support [ ]High flow    [ ]Acute  [ ]Chronic [ ]Hypoxic  [ ]Hypercarbic [ ]Other  [ ]Other organ failure     LABS:                                                                     8.0    9.61  )-----------( 348      ( 23 Apr 2021 07:17 )             27.3   04-23    136  |  100  |  79<H>  ----------------------------<  128<H>  4.4   |  22  |  2.20<H>    Ca    9.0      23 Apr 2021 07:16  Phos  4.1     04-23  Mg     2.3     04-23            RADIOLOGY & ADDITIONAL STUDIES:  < from: Xray Chest 1 View- PORTABLE-Urgent (Xray Chest 1 View- PORTABLE-Urgent .) (04.17.21 @ 15:41) >  IMPRESSION: Unchanged right pleural effusion and pulmonary edema.                LUANA SOL MD; Attending Radiologist  This document has been electronically signed. Apr 18 2021 10:21AM    < end of copied text >    cho< from: TTE with Doppler (w/Cont) (04.19.21 @ 06:38) >    Patient name: SELVIN CLAIRE  YOB: 1945   Age: 75 (F)   MR#: [id number]  Study Date: 4/19/2021  Location: [address] [place], [place] [id number]: Nilam Shannon RDCSEF (Visual Estimate): 25-30 %Severe  global left ventricular systolic dysfunction.5. Right ventricular enlargement with decreased right  ventricular systolic function.moderate pulmonary pressures.    < end of copied text >    PROTEIN CALORIE MALNUTRITION PRESENT: [ ]mild [ ]moderate [ ]severe [ ]underweight [ ]morbid obesity  https://www.andeal.org/vault/2440/web/files/ONC/Table_Clinical%20Characteristics%20to%20Document%20Malnutrition-White%20JV%20et%20al%202012.pdf    Height (cm): 149.9 (04-16-21 @ 22:31), 149.9 (01-13-21 @ 01:15)  Weight (kg): 53 (01-25-21 @ 13:11)  BMI (kg/m2): 23.6 (04-16-21 @ 22:31), 23.6 (01-25-21 @ 13:11)    [ ]PPSV2 < or = to 30% [ ]significant weight loss  [ ]poor nutritional intake  [ ]anasarca     Albumin, Serum: 3.4 g/dL (04-19-21 @ 01:13)   [ ]Artificial Nutrition      REFERRALS:   [ ]Chaplaincy  [ ]Hospice  [ ]Child Life  [ ]Social Work  [ ]Case management [ ]Holistic Therapy     Goals of Care Document:

## 2021-04-24 LAB
ANION GAP SERPL CALC-SCNC: 19 MMOL/L — HIGH (ref 5–17)
BUN SERPL-MCNC: 86 MG/DL — HIGH (ref 7–23)
CALCIUM SERPL-MCNC: 8.7 MG/DL — SIGNIFICANT CHANGE UP (ref 8.4–10.5)
CHLORIDE SERPL-SCNC: 96 MMOL/L — SIGNIFICANT CHANGE UP (ref 96–108)
CO2 SERPL-SCNC: 24 MMOL/L — SIGNIFICANT CHANGE UP (ref 22–31)
CREAT SERPL-MCNC: 2.19 MG/DL — HIGH (ref 0.5–1.3)
GLUCOSE BLDC GLUCOMTR-MCNC: 146 MG/DL — HIGH (ref 70–99)
GLUCOSE BLDC GLUCOMTR-MCNC: 215 MG/DL — HIGH (ref 70–99)
GLUCOSE BLDC GLUCOMTR-MCNC: 248 MG/DL — HIGH (ref 70–99)
GLUCOSE BLDC GLUCOMTR-MCNC: 80 MG/DL — SIGNIFICANT CHANGE UP (ref 70–99)
GLUCOSE SERPL-MCNC: 67 MG/DL — LOW (ref 70–99)
HCT VFR BLD CALC: 28.1 % — LOW (ref 34.5–45)
HGB BLD-MCNC: 8.3 G/DL — LOW (ref 11.5–15.5)
MAGNESIUM SERPL-MCNC: 2.2 MG/DL — SIGNIFICANT CHANGE UP (ref 1.6–2.6)
MCHC RBC-ENTMCNC: 22.3 PG — LOW (ref 27–34)
MCHC RBC-ENTMCNC: 29.5 GM/DL — LOW (ref 32–36)
MCV RBC AUTO: 75.5 FL — LOW (ref 80–100)
NRBC # BLD: 0 /100 WBCS — SIGNIFICANT CHANGE UP (ref 0–0)
NT-PROBNP SERPL-SCNC: HIGH PG/ML (ref 0–300)
PHOSPHATE SERPL-MCNC: 4 MG/DL — SIGNIFICANT CHANGE UP (ref 2.5–4.5)
PLATELET # BLD AUTO: 380 K/UL — SIGNIFICANT CHANGE UP (ref 150–400)
POTASSIUM SERPL-MCNC: 3.8 MMOL/L — SIGNIFICANT CHANGE UP (ref 3.5–5.3)
POTASSIUM SERPL-SCNC: 3.8 MMOL/L — SIGNIFICANT CHANGE UP (ref 3.5–5.3)
RBC # BLD: 3.72 M/UL — LOW (ref 3.8–5.2)
RBC # FLD: 19.7 % — HIGH (ref 10.3–14.5)
SARS-COV-2 RNA SPEC QL NAA+PROBE: SIGNIFICANT CHANGE UP
SODIUM SERPL-SCNC: 139 MMOL/L — SIGNIFICANT CHANGE UP (ref 135–145)
WBC # BLD: 10.91 K/UL — HIGH (ref 3.8–10.5)
WBC # FLD AUTO: 10.91 K/UL — HIGH (ref 3.8–10.5)

## 2021-04-24 PROCEDURE — 99233 SBSQ HOSP IP/OBS HIGH 50: CPT | Mod: GC

## 2021-04-24 PROCEDURE — 93010 ELECTROCARDIOGRAM REPORT: CPT

## 2021-04-24 RX ORDER — INSULIN GLARGINE 100 [IU]/ML
12 INJECTION, SOLUTION SUBCUTANEOUS AT BEDTIME
Refills: 0 | Status: DISCONTINUED | OUTPATIENT
Start: 2021-04-24 | End: 2021-04-30

## 2021-04-24 RX ADMIN — PANTOPRAZOLE SODIUM 40 MILLIGRAM(S): 20 TABLET, DELAYED RELEASE ORAL at 05:57

## 2021-04-24 RX ADMIN — Medication 10 UNIT(S): at 17:33

## 2021-04-24 RX ADMIN — Medication 10 UNIT(S): at 14:06

## 2021-04-24 RX ADMIN — Medication 2: at 14:06

## 2021-04-24 RX ADMIN — Medication 81 MILLIGRAM(S): at 13:59

## 2021-04-24 RX ADMIN — HEPARIN SODIUM 5000 UNIT(S): 5000 INJECTION INTRAVENOUS; SUBCUTANEOUS at 22:01

## 2021-04-24 RX ADMIN — Medication 50 MICROGRAM(S): at 05:57

## 2021-04-24 RX ADMIN — INSULIN GLARGINE 12 UNIT(S): 100 INJECTION, SOLUTION SUBCUTANEOUS at 22:01

## 2021-04-24 RX ADMIN — Medication 3 MILLIGRAM(S): at 22:15

## 2021-04-24 RX ADMIN — HYDROMORPHONE HYDROCHLORIDE 0.2 MILLIGRAM(S): 2 INJECTION INTRAMUSCULAR; INTRAVENOUS; SUBCUTANEOUS at 22:13

## 2021-04-24 RX ADMIN — HYDROMORPHONE HYDROCHLORIDE 0.2 MILLIGRAM(S): 2 INJECTION INTRAMUSCULAR; INTRAVENOUS; SUBCUTANEOUS at 20:42

## 2021-04-24 RX ADMIN — HEPARIN SODIUM 5000 UNIT(S): 5000 INJECTION INTRAVENOUS; SUBCUTANEOUS at 14:00

## 2021-04-24 RX ADMIN — BUMETANIDE 2 MILLIGRAM(S): 0.25 INJECTION INTRAMUSCULAR; INTRAVENOUS at 05:57

## 2021-04-24 RX ADMIN — ATORVASTATIN CALCIUM 80 MILLIGRAM(S): 80 TABLET, FILM COATED ORAL at 22:01

## 2021-04-24 RX ADMIN — HEPARIN SODIUM 5000 UNIT(S): 5000 INJECTION INTRAVENOUS; SUBCUTANEOUS at 05:57

## 2021-04-24 RX ADMIN — CLOPIDOGREL BISULFATE 75 MILLIGRAM(S): 75 TABLET, FILM COATED ORAL at 13:59

## 2021-04-24 RX ADMIN — Medication 2: at 17:33

## 2021-04-24 RX ADMIN — SPIRONOLACTONE 12.5 MILLIGRAM(S): 25 TABLET, FILM COATED ORAL at 13:59

## 2021-04-24 RX ADMIN — BUMETANIDE 2 MILLIGRAM(S): 0.25 INJECTION INTRAMUSCULAR; INTRAVENOUS at 16:41

## 2021-04-24 RX ADMIN — Medication 650 MILLIGRAM(S): at 13:59

## 2021-04-24 RX ADMIN — ESCITALOPRAM OXALATE 5 MILLIGRAM(S): 10 TABLET, FILM COATED ORAL at 14:05

## 2021-04-24 NOTE — PROGRESS NOTE ADULT - SUBJECTIVE AND OBJECTIVE BOX
S: No distress, Denies SOB or chest pain. Remains on 2L NC. Review of systems otherwise (-)    Review of Systems:   Constitutional: [ ] fevers, [ ] chills.   Skin: [ ] dry skin. [ ] rashes.  Psychiatric: [ ] depression, [ ] anxiety.   Gastrointestinal: [ ] BRBPR, [ ] melena.   Neurological: [ ] confusion. [ ] seizures. [ ] shuffling gait.   Ears,Nose,Mouth and Throat: [ ] ear pain [ ] sore throat.   Eyes: [ ] diplopia.   Respiratory: [ ] hemoptysis. [ ] shortness of breath  Cardiovascular: See HPI above  Hematologic/Lymphatic: [ ] anemia. [ ] painful nodes. [ ] prolonged bleeding.   Genitourinary: [ ] hematuria. [ ] flank pain.   Endocrine: [ ] significant change in weight. [ ] intolerance to heat and cold.     Review of systems [x ] otherwise negative, [ ] otherwise unable to obtain    FH: no family history of sudden cardiac death in first degree relatives    SH: [ ] tobacco, [ ] alcohol, [ ] drugs          aspirin enteric coated 81 milliGRAM(s) Oral daily  atorvastatin 80 milliGRAM(s) Oral at bedtime  buMETAnide 2 milliGRAM(s) Oral two times a day  clopidogrel Tablet 75 milliGRAM(s) Oral daily  dextrose 5%. 1000 milliLiter(s) IV Continuous <Continuous>  escitalopram 5 milliGRAM(s) Oral daily  glycopyrrolate Injectable 0.4 milliGRAM(s) IV Push every 6 hours PRN  heparin   Injectable 5000 Unit(s) SubCutaneous every 8 hours  hydrALAZINE 37.5 milliGRAM(s) Oral three times a day  HYDROmorphone  Injectable 0.2 milliGRAM(s) IV Push every 3 hours PRN  HYDROmorphone  Injectable 0.2 milliGRAM(s) IV Push every 3 hours PRN  HYDROmorphone  Injectable 0.2 milliGRAM(s) IV Push every 8 hours  insulin glargine Injectable (LANTUS) 12 Unit(s) SubCutaneous at bedtime  insulin lispro (ADMELOG) corrective regimen sliding scale   SubCutaneous three times a day before meals  insulin lispro (ADMELOG) corrective regimen sliding scale   SubCutaneous at bedtime  insulin lispro Injectable (ADMELOG) 10 Unit(s) SubCutaneous three times a day before meals  levothyroxine 50 MICROGram(s) Oral daily  LORazepam   Injectable 0.2 milliGRAM(s) IV Push every 8 hours PRN  melatonin 3 milliGRAM(s) Oral at bedtime  metoprolol succinate  milliGRAM(s) Oral daily  pantoprazole    Tablet 40 milliGRAM(s) Oral before breakfast  polyethylene glycol 3350 17 Gram(s) Oral two times a day PRN  senna 2 Tablet(s) Oral at bedtime  sodium bicarbonate 650 milliGRAM(s) Oral daily  spironolactone 12.5 milliGRAM(s) Oral daily                            8.3    10.91 )-----------( 380      ( 24 Apr 2021 06:43 )             28.1       Hemoglobin: 8.3 g/dL (04-24 @ 06:43)  Hemoglobin: 8.0 g/dL (04-23 @ 07:17)  Hemoglobin: 8.4 g/dL (04-22 @ 07:19)  Hemoglobin: 8.5 g/dL (04-21 @ 06:36)  Hemoglobin: 8.4 g/dL (04-20 @ 07:18)      04-24    139  |  96  |  86<H>  ----------------------------<  67<L>  3.8   |  24  |  2.19<H>    Ca    8.7      24 Apr 2021 06:43  Phos  4.0     04-24  Mg     2.2     04-24      Creatinine Trend: 2.19<--, 2.20<--, 1.87<--, 1.88<--, 1.80<--, 1.69<--    COAGS:           T(C): 36.8 (04-24-21 @ 05:17), Max: 36.8 (04-24-21 @ 05:17)  HR: 60 (04-24-21 @ 05:47) (60 - 79)  BP: 107/71 (04-24-21 @ 05:47) (92/54 - 111/68)  RR: 18 (04-24-21 @ 05:17) (18 - 18)  SpO2: 96% (04-24-21 @ 05:17) (94% - 97%)  Wt(kg): --    I&O's Summary    23 Apr 2021 07:01  -  24 Apr 2021 07:00  --------------------------------------------------------  IN: 600 mL / OUT: 1000 mL / NET: -400 mL      General: Well nourished in no acute distress. Alert and Oriented * 3.   Head: Normocephalic and atraumatic.   Neck: No JVD. No bruits. Supple. Does not appear to be enlarged.   Cardiovascular: + S1,S2 ; RRR Soft systolic murmur at the left lower sternal border. No rubs noted.    Lungs: CTA b/l. No rhonchi, rales or wheezes.   Abdomen: + BS, soft. Non tender. Non distended. No rebound. No guarding.   Extremities: No clubbing/cyanosis/edema.   Neurologic: Moves all four extremities. Full range of motion.   Skin: Warm and moist. The patient's skin has normal elasticity and good skin turgor.   Psychiatric: Appropriate mood and affect.  Musculoskeletal: Normal range of motion, normal strength    TELEMETRY: SR 70-80, NSVT    < from: TTE with Doppler (w/Cont) (04.19.21 @ 06:38) >  Conclusions:  1. Mitral clip seen. Mild mitral regurgitation.  Mean  transmitral valve gradient equals 5-6 mm Hg, which is  probably normal/milldy elevated  in the setting of a  Mitracip. HR 90s  2. Minimal aortic regurgitation.  3.  Mild left ventricular enlargement.  4. Endocardial visualization enhanced with intravenous  injection of Ultrasonic Enhancing Agent(Definity).  Severe  global left ventricular systolic dysfunction.  5. Right ventricular enlargement with decreased right  ventricular systolic function.  6. Normal tricuspid valve. Moderate-severe tricuspid  regurgitation.  7. Estimated pulmonary artery systolic pressure equals 57  mm Hg, assuming right atrial pressure equals 8 mm Hg,  consistent with moderate pulmonary pressures.    < end of copied text >      ASSESSMENT/PLAN: 	75y.o F Polish speaking h/o HTN, HLD, DM, CAD s/p CABG 2014 and prior PCI, severe mitral regurgitation (s/p mitral clip 2019), pulmonary HTN (TTE 2019), HF (EF 21 % June 2019), CKD IV not on dialysis (b/l SCr 2-2.2), hypothyroidism admitted for tachypnea 2/2 acute on chronic HFrEF exacerbation.     - Volume status improved, cr increasing - agree with transition to PO Bumex 2mg BID  - Wean O2 as tolerated - now on 2L NC  - Continue hydralazine/Toprol XL  - dapt/statin for prior stents   - Monitor creatine/lytes  - Anxiety treatment prn per med  - TTE noted - EF 25-30%, decreased RV function, mod-severe TR, mod pulm HTN  - No further inpatient cardiac w/u planned  - Palliative eval appreciated - planning for inpatient hospice

## 2021-04-24 NOTE — PROGRESS NOTE ADULT - PROBLEM SELECTOR PLAN 1
- admitted for acute on chronic systolic HF exacerbation (tachypneic to 30s, on non-rebreather in ED, needing bipap on floors for resp distress, now on NC). CXR on admission with pulm edema, BNP 6000. Less likely ACS (trop 49-> 42)  - last HF admission 1/2021, requiring milrinone assisted diuresis.   - Will transition to PO diuretics today (bumex 2mg BID). check CXR to evaluate pulm edema improvement. if not improved, add metolazone today.   - continue home dose: hydral 37.5 TID, metoprolol succinate 100mg QD. started on spironolactone 12.5mg QD this admission.   - TTE 4/19: EF 25-30%, mild MR with clip, severe global LV dysfunction, decreased RV dysfunction, mod-severe TR, mod pulm pressures.   - Cr ~2.2 (baseline 2)  - daily weights, strict in/out  - HF following, f/u recs  - palliative consulted per family request for multiple HF exacerbation admissions. family has decided on inpt hospice but continuing cardiac meds. palliative has sent hospice referral - f/u. - admitted for acute on chronic systolic HF exacerbation (tachypneic to 30s, on non-rebreather in ED, needing bipap on floors for resp distress, now on NC). CXR on admission with pulm edema, BNP 6000. Less likely ACS (trop 49-> 42)  - last HF admission 1/2021, requiring milrinone assisted diuresis.   - Will transition to PO diuretics today (bumex 2mg BID). CXR with improved pulm edema. will give metolazone.   - continue home dose: hydral 37.5 TID, metoprolol succinate 100mg QD. started on spironolactone 12.5mg QD this admission.   - TTE 4/19: EF 25-30%, mild MR with clip, severe global LV dysfunction, decreased RV dysfunction, mod-severe TR, mod pulm pressures.   - Cr ~2.2 (baseline 2)  - daily weights, strict in/out  - HF following, f/u recs  - palliative consulted per family request for multiple HF exacerbation admissions. family has decided on inpt hospice but continuing cardiac meds. palliative has sent hospice referral - considered not candidate though cardiology, primary team, and palliative have agreed she is candidate.

## 2021-04-24 NOTE — PROGRESS NOTE ADULT - SUBJECTIVE AND OBJECTIVE BOX
Adrianna Eidathy PGY1    Patient is a 75y old  Female who presents with a chief complaint of dyspnea (23 Apr 2021 15:25)      SUBJECTIVE / OVERNIGHT EVENTS:    MEDICATIONS  (STANDING):  aspirin enteric coated 81 milliGRAM(s) Oral daily  atorvastatin 80 milliGRAM(s) Oral at bedtime  buMETAnide 2 milliGRAM(s) Oral two times a day  clopidogrel Tablet 75 milliGRAM(s) Oral daily  dextrose 5%. 1000 milliLiter(s) (50 mL/Hr) IV Continuous <Continuous>  escitalopram 5 milliGRAM(s) Oral daily  heparin   Injectable 5000 Unit(s) SubCutaneous every 8 hours  hydrALAZINE 37.5 milliGRAM(s) Oral three times a day  HYDROmorphone  Injectable 0.2 milliGRAM(s) IV Push every 8 hours  insulin glargine Injectable (LANTUS) 20 Unit(s) SubCutaneous at bedtime  insulin lispro (ADMELOG) corrective regimen sliding scale   SubCutaneous three times a day before meals  insulin lispro (ADMELOG) corrective regimen sliding scale   SubCutaneous at bedtime  insulin lispro Injectable (ADMELOG) 10 Unit(s) SubCutaneous three times a day before meals  levothyroxine 50 MICROGram(s) Oral daily  melatonin 3 milliGRAM(s) Oral at bedtime  metoprolol succinate  milliGRAM(s) Oral daily  pantoprazole    Tablet 40 milliGRAM(s) Oral before breakfast  senna 2 Tablet(s) Oral at bedtime  sodium bicarbonate 650 milliGRAM(s) Oral daily  spironolactone 12.5 milliGRAM(s) Oral daily    MEDICATIONS  (PRN):  glycopyrrolate Injectable 0.4 milliGRAM(s) IV Push every 6 hours PRN Secretions.  HYDROmorphone  Injectable 0.2 milliGRAM(s) IV Push every 3 hours PRN Moderate to Severe pain  HYDROmorphone  Injectable 0.2 milliGRAM(s) IV Push every 3 hours PRN Dyspnea, labored breathing or RR > 28  LORazepam   Injectable 0.2 milliGRAM(s) IV Push every 8 hours PRN Anxiety or intractable respiratory distress.  polyethylene glycol 3350 17 Gram(s) Oral two times a day PRN Constipation      CAPILLARY BLOOD GLUCOSE      POCT Blood Glucose.: 119 mg/dL (23 Apr 2021 21:49)  POCT Blood Glucose.: 166 mg/dL (23 Apr 2021 17:30)  POCT Blood Glucose.: 184 mg/dL (23 Apr 2021 12:43)  POCT Blood Glucose.: 165 mg/dL (23 Apr 2021 08:45)    I&O's Summary    23 Apr 2021 07:01  -  24 Apr 2021 07:00  --------------------------------------------------------  IN: 600 mL / OUT: 1000 mL / NET: -400 mL        Vital Signs Last 24 Hrs  T(C): 36.8 (24 Apr 2021 05:17), Max: 36.8 (24 Apr 2021 05:17)  T(F): 98.2 (24 Apr 2021 05:17), Max: 98.2 (24 Apr 2021 05:17)  HR: 60 (24 Apr 2021 05:47) (60 - 84)  BP: 107/71 (24 Apr 2021 05:47) (92/54 - 111/70)  BP(mean): --  RR: 18 (24 Apr 2021 05:17) (18 - 18)  SpO2: 96% (24 Apr 2021 05:17) (94% - 99%)    PHYSICAL EXAM:  GENERAL: frail elderly lady, dyspneic at rest, +orthopnea  HEENT: EOMI   RESPIRATORY: no crackles anteriorly bilaterally  CARDIOVASCULAR: regular rate and rhythm, sinus on telemetry. no edema bilateral LE.   ABDOMEN: Nontender to palpation, normoactive bowel sounds  MUSCULOSKELETAL: able to move all extremities  NEURO: Non-focal, no tremors  PSYCH: anxious-appearing     LABS:                        8.0    9.61  )-----------( 348      ( 23 Apr 2021 07:17 )             27.3      04-23    136  |  100  |  79<H>  ----------------------------<  128<H>  4.4   |  22  |  2.20<H>    Ca    9.0      23 Apr 2021 07:16  Phos  4.1     04-23  Mg     2.3     04-23                RADIOLOGY & ADDITIONAL TESTS:    Imaging Personally Reviewed:    Consultant(s) Notes Reviewed:      Care Discussed with Consultants/Other Providers:   Adrianna Deng PGY1    Patient is a 75y old  Female who presents with a chief complaint of dyspnea (23 Apr 2021 15:25)      SUBJECTIVE / OVERNIGHT EVENTS:  - overnight - no events. pt was not candidate for inpt hospice.     MEDICATIONS  (STANDING):  aspirin enteric coated 81 milliGRAM(s) Oral daily  atorvastatin 80 milliGRAM(s) Oral at bedtime  buMETAnide 2 milliGRAM(s) Oral two times a day  clopidogrel Tablet 75 milliGRAM(s) Oral daily  dextrose 5%. 1000 milliLiter(s) (50 mL/Hr) IV Continuous <Continuous>  escitalopram 5 milliGRAM(s) Oral daily  heparin   Injectable 5000 Unit(s) SubCutaneous every 8 hours  hydrALAZINE 37.5 milliGRAM(s) Oral three times a day  HYDROmorphone  Injectable 0.2 milliGRAM(s) IV Push every 8 hours  insulin glargine Injectable (LANTUS) 20 Unit(s) SubCutaneous at bedtime  insulin lispro (ADMELOG) corrective regimen sliding scale   SubCutaneous three times a day before meals  insulin lispro (ADMELOG) corrective regimen sliding scale   SubCutaneous at bedtime  insulin lispro Injectable (ADMELOG) 10 Unit(s) SubCutaneous three times a day before meals  levothyroxine 50 MICROGram(s) Oral daily  melatonin 3 milliGRAM(s) Oral at bedtime  metoprolol succinate  milliGRAM(s) Oral daily  pantoprazole    Tablet 40 milliGRAM(s) Oral before breakfast  senna 2 Tablet(s) Oral at bedtime  sodium bicarbonate 650 milliGRAM(s) Oral daily  spironolactone 12.5 milliGRAM(s) Oral daily    MEDICATIONS  (PRN):  glycopyrrolate Injectable 0.4 milliGRAM(s) IV Push every 6 hours PRN Secretions.  HYDROmorphone  Injectable 0.2 milliGRAM(s) IV Push every 3 hours PRN Moderate to Severe pain  HYDROmorphone  Injectable 0.2 milliGRAM(s) IV Push every 3 hours PRN Dyspnea, labored breathing or RR > 28  LORazepam   Injectable 0.2 milliGRAM(s) IV Push every 8 hours PRN Anxiety or intractable respiratory distress.  polyethylene glycol 3350 17 Gram(s) Oral two times a day PRN Constipation      CAPILLARY BLOOD GLUCOSE      POCT Blood Glucose.: 119 mg/dL (23 Apr 2021 21:49)  POCT Blood Glucose.: 166 mg/dL (23 Apr 2021 17:30)  POCT Blood Glucose.: 184 mg/dL (23 Apr 2021 12:43)  POCT Blood Glucose.: 165 mg/dL (23 Apr 2021 08:45)    I&O's Summary    23 Apr 2021 07:01  -  24 Apr 2021 07:00  --------------------------------------------------------  IN: 600 mL / OUT: 1000 mL / NET: -400 mL        Vital Signs Last 24 Hrs  T(C): 36.8 (24 Apr 2021 05:17), Max: 36.8 (24 Apr 2021 05:17)  T(F): 98.2 (24 Apr 2021 05:17), Max: 98.2 (24 Apr 2021 05:17)  HR: 60 (24 Apr 2021 05:47) (60 - 84)  BP: 107/71 (24 Apr 2021 05:47) (92/54 - 111/70)  BP(mean): --  RR: 18 (24 Apr 2021 05:17) (18 - 18)  SpO2: 96% (24 Apr 2021 05:17) (94% - 99%)    PHYSICAL EXAM:  GENERAL: frail elderly lady, dyspneic at rest, +orthopnea  HEENT: EOMI   RESPIRATORY: no crackles anteriorly bilaterally  CARDIOVASCULAR: regular rate and rhythm, sinus on telemetry. no edema bilateral LE.   ABDOMEN: Nontender to palpation, normoactive bowel sounds  MUSCULOSKELETAL: able to move all extremities  NEURO: Non-focal, no tremors  PSYCH: anxious-appearing     LABS:                        8.0    9.61  )-----------( 348      ( 23 Apr 2021 07:17 )             27.3      04-23    136  |  100  |  79<H>  ----------------------------<  128<H>  4.4   |  22  |  2.20<H>    Ca    9.0      23 Apr 2021 07:16  Phos  4.1     04-23  Mg     2.3     04-23                RADIOLOGY & ADDITIONAL TESTS:    Imaging Personally Reviewed:    Consultant(s) Notes Reviewed:      Care Discussed with Consultants/Other Providers:   Adrianna Eidathy PGY1    Patient is a 75y old  Female who presents with a chief complaint of dyspnea (23 Apr 2021 15:25)      SUBJECTIVE / OVERNIGHT EVENTS:  - overnight - no events. pt was not candidate for inpt hospice as she has oral route and noted to be A&O x4.   - am - pt is on 3L NC oxygen, mild dyspnea. denies chest pain. continued sob.     MEDICATIONS  (STANDING):  aspirin enteric coated 81 milliGRAM(s) Oral daily  atorvastatin 80 milliGRAM(s) Oral at bedtime  buMETAnide 2 milliGRAM(s) Oral two times a day  clopidogrel Tablet 75 milliGRAM(s) Oral daily  dextrose 5%. 1000 milliLiter(s) (50 mL/Hr) IV Continuous <Continuous>  escitalopram 5 milliGRAM(s) Oral daily  heparin   Injectable 5000 Unit(s) SubCutaneous every 8 hours  hydrALAZINE 37.5 milliGRAM(s) Oral three times a day  HYDROmorphone  Injectable 0.2 milliGRAM(s) IV Push every 8 hours  insulin glargine Injectable (LANTUS) 20 Unit(s) SubCutaneous at bedtime  insulin lispro (ADMELOG) corrective regimen sliding scale   SubCutaneous three times a day before meals  insulin lispro (ADMELOG) corrective regimen sliding scale   SubCutaneous at bedtime  insulin lispro Injectable (ADMELOG) 10 Unit(s) SubCutaneous three times a day before meals  levothyroxine 50 MICROGram(s) Oral daily  melatonin 3 milliGRAM(s) Oral at bedtime  metoprolol succinate  milliGRAM(s) Oral daily  pantoprazole    Tablet 40 milliGRAM(s) Oral before breakfast  senna 2 Tablet(s) Oral at bedtime  sodium bicarbonate 650 milliGRAM(s) Oral daily  spironolactone 12.5 milliGRAM(s) Oral daily    MEDICATIONS  (PRN):  glycopyrrolate Injectable 0.4 milliGRAM(s) IV Push every 6 hours PRN Secretions.  HYDROmorphone  Injectable 0.2 milliGRAM(s) IV Push every 3 hours PRN Moderate to Severe pain  HYDROmorphone  Injectable 0.2 milliGRAM(s) IV Push every 3 hours PRN Dyspnea, labored breathing or RR > 28  LORazepam   Injectable 0.2 milliGRAM(s) IV Push every 8 hours PRN Anxiety or intractable respiratory distress.  polyethylene glycol 3350 17 Gram(s) Oral two times a day PRN Constipation      CAPILLARY BLOOD GLUCOSE      POCT Blood Glucose.: 119 mg/dL (23 Apr 2021 21:49)  POCT Blood Glucose.: 166 mg/dL (23 Apr 2021 17:30)  POCT Blood Glucose.: 184 mg/dL (23 Apr 2021 12:43)  POCT Blood Glucose.: 165 mg/dL (23 Apr 2021 08:45)    I&O's Summary    23 Apr 2021 07:01  -  24 Apr 2021 07:00  --------------------------------------------------------  IN: 600 mL / OUT: 1000 mL / NET: -400 mL        Vital Signs Last 24 Hrs  T(C): 36.8 (24 Apr 2021 05:17), Max: 36.8 (24 Apr 2021 05:17)  T(F): 98.2 (24 Apr 2021 05:17), Max: 98.2 (24 Apr 2021 05:17)  HR: 60 (24 Apr 2021 05:47) (60 - 84)  BP: 107/71 (24 Apr 2021 05:47) (92/54 - 111/70)  BP(mean): --  RR: 18 (24 Apr 2021 05:17) (18 - 18)  SpO2: 96% (24 Apr 2021 05:17) (94% - 99%)    PHYSICAL EXAM:  GENERAL: frail elderly lady, mild dyspneic at rest, +orthopnea  HEENT: EOMI   RESPIRATORY: no crackles anteriorly bilaterally  CARDIOVASCULAR: regular rate and rhythm, sinus on telemetry. no edema bilateral LE.   ABDOMEN: Nontender to palpation, normoactive bowel sounds  MUSCULOSKELETAL: able to move all extremities  NEURO: Non-focal, no tremors  PSYCH: anxious-appearing     LABS:                        8.0    9.61  )-----------( 348      ( 23 Apr 2021 07:17 )             27.3      04-23    136  |  100  |  79<H>  ----------------------------<  128<H>  4.4   |  22  |  2.20<H>    Ca    9.0      23 Apr 2021 07:16  Phos  4.1     04-23  Mg     2.3     04-23                RADIOLOGY & ADDITIONAL TESTS:    Imaging Personally Reviewed:    Consultant(s) Notes Reviewed:      Care Discussed with Consultants/Other Providers:

## 2021-04-24 NOTE — PROGRESS NOTE ADULT - ASSESSMENT
75y.o F Uzbek speaking h/o DM, CAD s/p CABG 2014 and prior PCI, severe mitral regurgitation (s/p mitral clip 2019), pulmonary HTN (TTE 2019), HF (EF 21 % June 2019), CKD IV not on dialysis (b/l SCr 2-2.2), admitted for CHF exacerbation, on IV bumex  admitted for tachypnea 2/2 acute on chronic HFrEF exacerbation.  75y.o F Wolof speaking h/o DM, CAD s/p CABG 2014 and prior PCI, severe mitral regurgitation (s/p mitral clip 2019), pulmonary HTN (TTE 2019), HF (EF 21 % June 2019), CKD IV not on dialysis (b/l SCr 2-2.2), admitted for CHF exacerbation, on IV bumex  admitted for tachypnea 2/2 acute on chronic HFrEF exacerbation. not candidate for inpt hospice, dispo decision pending.

## 2021-04-24 NOTE — PROGRESS NOTE ADULT - SUBJECTIVE AND OBJECTIVE BOX
[facility]   MD Dion Dasilva MD, D.O. Ruoru Wong, PA    From 7 AM - 5 PM:  OFFICE: [phone]  Dr. Muhammad cell: [phone]  Dr. Montero cell: [phone]  Dr. Soria cell: [phone]  RASHIDA Smith cell: [phone]    From 5 PM - 7 AM: Answering Service: [phone]  Date of service: 04-24-21 @ 10:59    RENAL FOLLOW UP NOTE  --------------------------------------------------------------------------------  HPI:  Pt seen and examined at bedside.   Denies SOB, chest pain     PAST HISTORY  --------------------------------------------------------------------------------  No significant changes to PMH, PSH, FHx, SHx, unless otherwise noted    ALLERGIES & MEDICATIONS  --------------------------------------------------------------------------------  Allergies    azithromycin (Hives; Pruritus)    Intolerances      Standing Inpatient Medications  aspirin enteric coated 81 milliGRAM(s) Oral daily  atorvastatin 80 milliGRAM(s) Oral at bedtime  buMETAnide 2 milliGRAM(s) Oral two times a day  clopidogrel Tablet 75 milliGRAM(s) Oral daily  dextrose 5%. 1000 milliLiter(s) IV Continuous <Continuous>  escitalopram 5 milliGRAM(s) Oral daily  heparin   Injectable 5000 Unit(s) SubCutaneous every 8 hours  hydrALAZINE 37.5 milliGRAM(s) Oral three times a day  HYDROmorphone  Injectable 0.2 milliGRAM(s) IV Push every 8 hours  insulin glargine Injectable (LANTUS) 12 Unit(s) SubCutaneous at bedtime  insulin lispro (ADMELOG) corrective regimen sliding scale   SubCutaneous three times a day before meals  insulin lispro (ADMELOG) corrective regimen sliding scale   SubCutaneous at bedtime  insulin lispro Injectable (ADMELOG) 10 Unit(s) SubCutaneous three times a day before meals  levothyroxine 50 MICROGram(s) Oral daily  melatonin 3 milliGRAM(s) Oral at bedtime  metoprolol succinate  milliGRAM(s) Oral daily  pantoprazole    Tablet 40 milliGRAM(s) Oral before breakfast  senna 2 Tablet(s) Oral at bedtime  sodium bicarbonate 650 milliGRAM(s) Oral daily  spironolactone 12.5 milliGRAM(s) Oral daily    PRN Inpatient Medications  glycopyrrolate Injectable 0.4 milliGRAM(s) IV Push every 6 hours PRN  HYDROmorphone  Injectable 0.2 milliGRAM(s) IV Push every 3 hours PRN  HYDROmorphone  Injectable 0.2 milliGRAM(s) IV Push every 3 hours PRN  LORazepam   Injectable 0.2 milliGRAM(s) IV Push every 8 hours PRN  polyethylene glycol 3350 17 Gram(s) Oral two times a day PRN      REVIEW OF SYSTEMS  --------------------------------------------------------------------------------  General: no fever  CVS: no chest pain  RESP: no sob, no cough  ABD: no abdominal pain  : no dysuria,  MSK: no edema     VITALS/PHYSICAL EXAM  --------------------------------------------------------------------------------  T(C): 36.8 (04-24-21 @ 05:17), Max: 36.8 (04-24-21 @ 05:17)  HR: 60 (04-24-21 @ 05:47) (60 - 79)  BP: 107/71 (04-24-21 @ 05:47) (92/54 - 111/68)  RR: 18 (04-24-21 @ 05:17) (18 - 18)  SpO2: 96% (04-24-21 @ 05:17) (94% - 97%)  Wt(kg): --        04-23-21 @ 07:01  -  04-24-21 @ 07:00  --------------------------------------------------------  IN: 600 mL / OUT: 1000 mL / NET: -400 mL      Physical Exam:  	Gen: NAD  	HEENT: MMM  	Pulm: CTA B/L  	CV: S1S2  	Abd: Soft, +BS  	Ext: No LE edema B/L                      Neuro: Awake   	Skin: Warm and Dry   	    LABS/STUDIES  --------------------------------------------------------------------------------              8.3    10.91 >-----------<  380      [04-24-21 @ 06:43]              28.1     139  |  96  |  86  ----------------------------<  67      [04-24-21 @ 06:43]  3.8   |  24  |  2.19        Ca     8.7     [04-24-21 @ 06:43]      Mg     2.2     [04-24-21 @ 06:43]      Phos  4.0     [04-24-21 @ 06:43]    Creatinine Trend:  SCr 2.19 [04-24 @ 06:43]  SCr 2.20 [04-23 @ 07:16]  SCr 1.87 [04-22 @ 07:07]  SCr 1.88 [04-21 @ 21:21]  SCr 1.80 [04-21 @ 06:34]      Ferritin 111      [01-13-21 @ 01:42]  HbA1c 7.5      [10-08-19 @ 14:30]  TSH 1.94      [04-17-21 @ 02:28]

## 2021-04-24 NOTE — PROGRESS NOTE ADULT - PROBLEM SELECTOR PLAN 3
Most likely due to LV failure   -repeat TTE shows mild pulm HTN w/ normal RV size but decreased function. I have personally seen and examined this patient. I have fully participated in the care of this patient. I have reviewed all pertinent clinical information, including history physical exam, plan and the Resident's note and agree except as noted I have personally performed a face to face diagnostic evaluation on this patient. I have reviewed the PA note and agree with the history, exam, and plan of care, except as noted.

## 2021-04-24 NOTE — PROGRESS NOTE ADULT - ASSESSMENT
75y.o F Czech speaking h/o HTN, HLD, DM, CAD s/p CABG 2014 and prior PCI, severe mitral regurgitation (s/p mitral clip 2019), pulmonary HTN (TTE 2019), HF (EF 21 % June 2019), CKD IV not on dialysis (b/l SCr 2-2.2), hypothyroidism comes to the Ed with 7 days of SOB. Per chart review, patient recently admitted for hypoxic respiratory failure 2/2 acute on chronic HFrEF exacerbation in the setting of mitral stenosis c/b MERVIN on CKD.  Pt initially treated w/ IV diuresis but was started on milrinone drip after RHC showed persistent elevated filled pressures. She received IV diuresis, milrinone continued dyspnea despite clinical euvolemic status.  PRN xanax started for possible anxiety component.  Labs w/ intermittent elevations in WBC in the setting of asymptomatic bacteruria    MERVIN on CKD stage 4  - baseline Cr 1.9-2.2; has had recurrent MERVIN's over the years  - CKD is likely 2/2 recurrent MERVIN's + underlying DM/HTN  - Renal function stable  - Continue Diuretics per cardiology. now on oral   - Avoid hypotension   - Avoid nephrotoxins including NSAIDs, IV contrast (if possible), Fleet's products  - monitor I/O's accurately    Acidosis  serum bicarb at goal for CKD   Continue sodium bicarb daily  MOnitor serum CO2    HTN  BP controlled  avoid hypotension  Low salt diet   MOnitor BP    SOB  likely sec to CHF  Follow up Cardiology  Monitor daily weights     Hypokalemia  sec to diuretics  improved   MOnitor serm K

## 2021-04-24 NOTE — PROGRESS NOTE ADULT - ATTENDING COMMENTS
-C/w same dose of Bumex for now. -Cr rising which suggests patient is being adequately diuresed.   -F/u BNP.

## 2021-04-24 NOTE — PROGRESS NOTE ADULT - PROBLEM SELECTOR PLAN 2
MERVIN on CKD stage 4. 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 pt continues to be dyspneic though euvolemic on exam. Will check CXR

## 2021-04-24 NOTE — PROGRESS NOTE ADULT - ATTENDING COMMENTS
Patient seen and examined.  Agree with above.   Continue with medical therapy of cardiomyopathy  Palliative follow up noted - plans for hospice  No further inpatient cardiac workup needed at this time.     Corie Ga MD

## 2021-04-25 LAB
ANION GAP SERPL CALC-SCNC: 19 MMOL/L — HIGH (ref 5–17)
BUN SERPL-MCNC: 87 MG/DL — HIGH (ref 7–23)
CALCIUM SERPL-MCNC: 8.8 MG/DL — SIGNIFICANT CHANGE UP (ref 8.4–10.5)
CHLORIDE SERPL-SCNC: 94 MMOL/L — LOW (ref 96–108)
CO2 SERPL-SCNC: 23 MMOL/L — SIGNIFICANT CHANGE UP (ref 22–31)
CREAT SERPL-MCNC: 2.12 MG/DL — HIGH (ref 0.5–1.3)
GLUCOSE BLDC GLUCOMTR-MCNC: 131 MG/DL — HIGH (ref 70–99)
GLUCOSE BLDC GLUCOMTR-MCNC: 170 MG/DL — HIGH (ref 70–99)
GLUCOSE BLDC GLUCOMTR-MCNC: 197 MG/DL — HIGH (ref 70–99)
GLUCOSE BLDC GLUCOMTR-MCNC: 203 MG/DL — HIGH (ref 70–99)
GLUCOSE SERPL-MCNC: 97 MG/DL — SIGNIFICANT CHANGE UP (ref 70–99)
HCT VFR BLD CALC: 27.9 % — LOW (ref 34.5–45)
HGB BLD-MCNC: 8.3 G/DL — LOW (ref 11.5–15.5)
MAGNESIUM SERPL-MCNC: 2.1 MG/DL — SIGNIFICANT CHANGE UP (ref 1.6–2.6)
MCHC RBC-ENTMCNC: 22.7 PG — LOW (ref 27–34)
MCHC RBC-ENTMCNC: 29.7 GM/DL — LOW (ref 32–36)
MCV RBC AUTO: 76.2 FL — LOW (ref 80–100)
NRBC # BLD: 0 /100 WBCS — SIGNIFICANT CHANGE UP (ref 0–0)
PHOSPHATE SERPL-MCNC: 3.3 MG/DL — SIGNIFICANT CHANGE UP (ref 2.5–4.5)
PLATELET # BLD AUTO: 351 K/UL — SIGNIFICANT CHANGE UP (ref 150–400)
POTASSIUM SERPL-MCNC: 3.7 MMOL/L — SIGNIFICANT CHANGE UP (ref 3.5–5.3)
POTASSIUM SERPL-SCNC: 3.7 MMOL/L — SIGNIFICANT CHANGE UP (ref 3.5–5.3)
RBC # BLD: 3.66 M/UL — LOW (ref 3.8–5.2)
RBC # FLD: 19.6 % — HIGH (ref 10.3–14.5)
SODIUM SERPL-SCNC: 136 MMOL/L — SIGNIFICANT CHANGE UP (ref 135–145)
WBC # BLD: 9.61 K/UL — SIGNIFICANT CHANGE UP (ref 3.8–10.5)
WBC # FLD AUTO: 9.61 K/UL — SIGNIFICANT CHANGE UP (ref 3.8–10.5)

## 2021-04-25 PROCEDURE — 99232 SBSQ HOSP IP/OBS MODERATE 35: CPT | Mod: GC

## 2021-04-25 PROCEDURE — 99233 SBSQ HOSP IP/OBS HIGH 50: CPT

## 2021-04-25 RX ADMIN — HEPARIN SODIUM 5000 UNIT(S): 5000 INJECTION INTRAVENOUS; SUBCUTANEOUS at 21:44

## 2021-04-25 RX ADMIN — BUMETANIDE 2 MILLIGRAM(S): 0.25 INJECTION INTRAMUSCULAR; INTRAVENOUS at 06:40

## 2021-04-25 RX ADMIN — Medication 1: at 17:38

## 2021-04-25 RX ADMIN — ATORVASTATIN CALCIUM 80 MILLIGRAM(S): 80 TABLET, FILM COATED ORAL at 21:44

## 2021-04-25 RX ADMIN — Medication 10 UNIT(S): at 09:14

## 2021-04-25 RX ADMIN — HEPARIN SODIUM 5000 UNIT(S): 5000 INJECTION INTRAVENOUS; SUBCUTANEOUS at 06:41

## 2021-04-25 RX ADMIN — Medication 10 UNIT(S): at 13:16

## 2021-04-25 RX ADMIN — Medication 50 MICROGRAM(S): at 06:40

## 2021-04-25 RX ADMIN — Medication 100 MILLIGRAM(S): at 13:11

## 2021-04-25 RX ADMIN — Medication 650 MILLIGRAM(S): at 13:10

## 2021-04-25 RX ADMIN — HYDROMORPHONE HYDROCHLORIDE 0.2 MILLIGRAM(S): 2 INJECTION INTRAMUSCULAR; INTRAVENOUS; SUBCUTANEOUS at 13:55

## 2021-04-25 RX ADMIN — BUMETANIDE 2 MILLIGRAM(S): 0.25 INJECTION INTRAMUSCULAR; INTRAVENOUS at 17:32

## 2021-04-25 RX ADMIN — INSULIN GLARGINE 12 UNIT(S): 100 INJECTION, SOLUTION SUBCUTANEOUS at 22:00

## 2021-04-25 RX ADMIN — Medication 1: at 13:15

## 2021-04-25 RX ADMIN — ESCITALOPRAM OXALATE 5 MILLIGRAM(S): 10 TABLET, FILM COATED ORAL at 13:09

## 2021-04-25 RX ADMIN — HYDROMORPHONE HYDROCHLORIDE 0.2 MILLIGRAM(S): 2 INJECTION INTRAMUSCULAR; INTRAVENOUS; SUBCUTANEOUS at 21:46

## 2021-04-25 RX ADMIN — HEPARIN SODIUM 5000 UNIT(S): 5000 INJECTION INTRAVENOUS; SUBCUTANEOUS at 13:10

## 2021-04-25 RX ADMIN — SPIRONOLACTONE 12.5 MILLIGRAM(S): 25 TABLET, FILM COATED ORAL at 17:32

## 2021-04-25 RX ADMIN — HYDROMORPHONE HYDROCHLORIDE 0.2 MILLIGRAM(S): 2 INJECTION INTRAMUSCULAR; INTRAVENOUS; SUBCUTANEOUS at 13:30

## 2021-04-25 RX ADMIN — Medication 0.2 MILLIGRAM(S): at 00:24

## 2021-04-25 RX ADMIN — Medication 37.5 MILLIGRAM(S): at 13:09

## 2021-04-25 RX ADMIN — Medication 81 MILLIGRAM(S): at 13:09

## 2021-04-25 RX ADMIN — PANTOPRAZOLE SODIUM 40 MILLIGRAM(S): 20 TABLET, DELAYED RELEASE ORAL at 06:40

## 2021-04-25 RX ADMIN — Medication 10 UNIT(S): at 17:38

## 2021-04-25 RX ADMIN — Medication 0.2 MILLIGRAM(S): at 21:42

## 2021-04-25 RX ADMIN — CLOPIDOGREL BISULFATE 75 MILLIGRAM(S): 75 TABLET, FILM COATED ORAL at 13:09

## 2021-04-25 RX ADMIN — Medication 3 MILLIGRAM(S): at 21:44

## 2021-04-25 NOTE — CHART NOTE - NSCHARTNOTEFT_GEN_A_CORE
I received a call at about 19:30 from the patient's primary nurse on [facility] informing me that the patient was to be transferred to the palliative care unit. For that reason, I contacted the covering physician in the palliative care unit, at pager [phone], in order to ensure a smooth transition of care. The covering team informed me that they are aware that the patient is to be transferred tonight and that the patient's family is aware of the upcoming transfer.

## 2021-04-25 NOTE — PROGRESS NOTE ADULT - PROBLEM SELECTOR PLAN 1
- admitted for acute on chronic systolic HF exacerbation (tachypneic to 30s, on non-rebreather in ED, needing bipap on floors for resp distress, now on NC). CXR on admission with pulm edema, BNP 6000. Less likely ACS (trop 49-> 42)  - last HF admission 1/2021, requiring milrinone assisted diuresis.   - Will transition to PO diuretics today (bumex 2mg BID). CXR with improved pulm edema. will give metolazone.   - continue home dose: hydral 37.5 TID, metoprolol succinate 100mg QD. started on spironolactone 12.5mg QD this admission.   - TTE 4/19: EF 25-30%, mild MR with clip, severe global LV dysfunction, decreased RV dysfunction, mod-severe TR, mod pulm pressures.   - Cr ~2.2 (baseline 2)  - daily weights, strict in/out  - HF following, f/u recs  - palliative consulted per family request for multiple HF exacerbation admissions. family has decided on inpt hospice but continuing cardiac meds. palliative has sent hospice referral - considered not candidate though cardiology, primary team, and palliative have agreed she is candidate. - admitted for acute on chronic systolic HF exacerbation (tachypneic to 30s, on non-rebreather in ED, needing bipap on floors for resp distress, now on NC). CXR on admission with pulm edema, BNP 6000. Less likely ACS (trop 49-> 42)  - last HF admission 1/2021, requiring milrinone assisted diuresis.   - continue bumex 2mg BID. CXR with improved pulm edema. euvolemic on exam. adequate UO.   - continue home dose: hydral 37.5 TID, metoprolol succinate 100mg QD. started on spironolactone 12.5mg QD this admission.   - TTE 4/19: EF 25-30%, mild MR with clip, severe global LV dysfunction, decreased RV dysfunction, mod-severe TR, mod pulm pressures.   - Cr ~2.2 (baseline 2)  - daily weights, strict in/out  - HF following, f/u recs  - palliative consulted per family request for multiple HF exacerbation admissions. family has decided on inpt hospice but continuing cardiac meds. palliative has sent hospice referral - considered not candidate though cardiology, primary team, and palliative have agreed she is candidate.

## 2021-04-25 NOTE — PROGRESS NOTE ADULT - ASSESSMENT
75y.o F Wolof speaking h/o HTN, HLD, DM, CAD s/p CABG 2014 and prior PCI, severe mitral regurgitation (s/p mitral clip 2019), pulmonary HTN (TTE 2019), HF (EF 21 % June 2019), CKD IV not on dialysis (b/l SCr 2-2.2), hypothyroidism comes to the Ed with 7 days of SOB. Per chart review, patient recently admitted for hypoxic respiratory failure 2/2 acute on chronic HFrEF exacerbation in the setting of mitral stenosis c/b MERVIN on CKD.  Pt initially treated w/ IV diuresis but was started on milrinone drip after RHC showed persistent elevated filled pressures. She received IV diuresis, milrinone continued dyspnea despite clinical euvolemic status.  PRN xanax started for possible anxiety component.  Labs w/ intermittent elevations in WBC in the setting of asymptomatic bacteruria    MERVIN on CKD stage 4  - baseline Cr 1.9-2.2; has had recurrent MERVIN's over the years  - CKD is likely 2/2 recurrent MERVIN's + underlying DM/HTN  - Renal function stable  - Continue Diuretics per cardiology. now on oral   - Avoid hypotension   - Avoid nephrotoxins including NSAIDs, IV contrast (if possible), Fleet's products  - monitor I/O's accurately    Acidosis  serum bicarb at goal for CKD   Continue sodium bicarb daily  MOnitor serum CO2    HTN  BP controlled  avoid hypotension  Low salt diet   MOnitor BP    SOB  likely sec to CHF  Improved  Follow up Cardiology  Monitor daily weights     Hypokalemia  sec to diuretics  improved   MOnitor serm K

## 2021-04-25 NOTE — PROVIDER CONTACT NOTE (OTHER) - SITUATION
4 beats WCT on cardiac monitor
BG 57
Pt refusing bipap, very anxious
Patient complaining of chest pain, patient anxious at this time and unable to rate.
patient c/o 10/10 chest pain and increase SOB
patient c/o increase SOB and persistent chest pain
Pt. blood glucose was 401. Repeat was done, 397. 16 units of lantus given. 3 units of insulin corrective for bedtime.
5 beats of WCT on tele monitoring
Pt. had PAT x 2.1 secs on telemetry monitor.
Pt. had small amount of blood in stool

## 2021-04-25 NOTE — PROVIDER CONTACT NOTE (OTHER) - ASSESSMENT
Pt. VSS. BP: 113/74. HR: 85. No c/o pain or discomfort.
Pt. asymptomatic and asleep at the time.
patient is a/ox4, VSS, on NC. Patient states chest pain in midsternal and feels its "hard to breathe". Patient NSR on Tele, no ectopies at given time of onset of chest pain.
Pt. asymptomatic, states "I am just sleepy"
patient is a/ox4, no signs of hypoglycemia present.
Patient A&Ox3. HR 79, BP 96/59, RR 20, O2 saturation 96% on 2L NC. Patient denies chest pain, lightheadedness, or shortness of breath.
Patient Aox4. Denies any distress.  /70, HR 84
Pt feels very anxious, refusing bipap. VSS- /68, HR 76, 97.5 temp, 98% bipap
Pt. A&Ox3, alert and anxious, Korean speaking. Pt. states she has pain and is pointing at her chest and is unable to rate. Pt. starts breathing quickly when anxious while on 2L NC. VSS HR 76, BP 95/60, RR 20, pulse ox 96% on 2L NC.
patient is a/ox4, on assessment patient is belly-breathing and tachypneic, otherwise VSS.

## 2021-04-25 NOTE — PROVIDER CONTACT NOTE (OTHER) - NAME OF MD/NP/PA/DO NOTIFIED:
Bozena Brown, T6
Crow Giles, T6 MD
MD Rodas
Crow Giles, T6 MD
Gurinder Vilchis MD
MD Klaus Team 6
Bozena Giles MD
Carlito Lanza
MD Rafael Team 6
MD Rodas

## 2021-04-25 NOTE — PROGRESS NOTE ADULT - SUBJECTIVE AND OBJECTIVE BOX
S: No distress, Denies SOB or chest pain. Remains on 2L NC. Review of systems otherwise (-)    Review of Systems:   Constitutional: [ ] fevers, [ ] chills.   Skin: [ ] dry skin. [ ] rashes.  Psychiatric: [ ] depression, [ ] anxiety.   Gastrointestinal: [ ] BRBPR, [ ] melena.   Neurological: [ ] confusion. [ ] seizures. [ ] shuffling gait.   Ears,Nose,Mouth and Throat: [ ] ear pain [ ] sore throat.   Eyes: [ ] diplopia.   Respiratory: [ ] hemoptysis. [ ] shortness of breath  Cardiovascular: See HPI above  Hematologic/Lymphatic: [ ] anemia. [ ] painful nodes. [ ] prolonged bleeding.   Genitourinary: [ ] hematuria. [ ] flank pain.   Endocrine: [ ] significant change in weight. [ ] intolerance to heat and cold.     Review of systems [x ] otherwise negative, [ ] otherwise unable to obtain    FH: no family history of sudden cardiac death in first degree relatives    SH: [ ] tobacco, [ ] alcohol, [ ] drugs            aspirin enteric coated 81 milliGRAM(s) Oral daily  atorvastatin 80 milliGRAM(s) Oral at bedtime  buMETAnide 2 milliGRAM(s) Oral two times a day  clopidogrel Tablet 75 milliGRAM(s) Oral daily  dextrose 5%. 1000 milliLiter(s) IV Continuous <Continuous>  escitalopram 5 milliGRAM(s) Oral daily  glycopyrrolate Injectable 0.4 milliGRAM(s) IV Push every 6 hours PRN  heparin   Injectable 5000 Unit(s) SubCutaneous every 8 hours  hydrALAZINE 37.5 milliGRAM(s) Oral three times a day  HYDROmorphone  Injectable 0.2 milliGRAM(s) IV Push every 8 hours  HYDROmorphone  Injectable 0.2 milliGRAM(s) IV Push every 3 hours PRN  HYDROmorphone  Injectable 0.2 milliGRAM(s) IV Push every 3 hours PRN  insulin glargine Injectable (LANTUS) 12 Unit(s) SubCutaneous at bedtime  insulin lispro (ADMELOG) corrective regimen sliding scale   SubCutaneous three times a day before meals  insulin lispro (ADMELOG) corrective regimen sliding scale   SubCutaneous at bedtime  insulin lispro Injectable (ADMELOG) 10 Unit(s) SubCutaneous three times a day before meals  levothyroxine 50 MICROGram(s) Oral daily  LORazepam   Injectable 0.2 milliGRAM(s) IV Push every 8 hours PRN  melatonin 3 milliGRAM(s) Oral at bedtime  metoprolol succinate  milliGRAM(s) Oral daily  pantoprazole    Tablet 40 milliGRAM(s) Oral before breakfast  polyethylene glycol 3350 17 Gram(s) Oral two times a day PRN  senna 2 Tablet(s) Oral at bedtime  sodium bicarbonate 650 milliGRAM(s) Oral daily  spironolactone 12.5 milliGRAM(s) Oral daily                            8.3    9.61  )-----------( 351      ( 25 Apr 2021 07:05 )             27.9       Hemoglobin: 8.3 g/dL (04-25 @ 07:05)  Hemoglobin: 8.3 g/dL (04-24 @ 06:43)  Hemoglobin: 8.0 g/dL (04-23 @ 07:17)  Hemoglobin: 8.4 g/dL (04-22 @ 07:19)  Hemoglobin: 8.5 g/dL (04-21 @ 06:36)      04-25    136  |  94<L>  |  87<H>  ----------------------------<  97  3.7   |  23  |  2.12<H>    Ca    8.8      25 Apr 2021 07:05  Phos  3.3     04-25  Mg     2.1     04-25      Creatinine Trend: 2.12<--, 2.19<--, 2.20<--, 1.87<--, 1.88<--, 1.80<--    COAGS:           T(C): 36.8 (04-25-21 @ 04:29), Max: 36.8 (04-25-21 @ 00:05)  HR: 76 (04-25-21 @ 06:32) (66 - 76)  BP: 107/72 (04-25-21 @ 06:32) (95/60 - 107/72)  RR: 18 (04-25-21 @ 04:29) (18 - 18)  SpO2: 98% (04-25-21 @ 04:29) (96% - 98%)  Wt(kg): --    I&O's Summary    24 Apr 2021 07:01  -  25 Apr 2021 07:00  --------------------------------------------------------  IN: 660 mL / OUT: 1650 mL / NET: -990 mL      General: Well nourished in no acute distress. Alert and Oriented * 3.   Head: Normocephalic and atraumatic.   Neck: No JVD. No bruits. Supple. Does not appear to be enlarged.   Cardiovascular: + S1,S2 ; RRR Soft systolic murmur at the left lower sternal border. No rubs noted.    Lungs: CTA b/l. No rhonchi, rales or wheezes.   Abdomen: + BS, soft. Non tender. Non distended. No rebound. No guarding.   Extremities: No clubbing/cyanosis/edema.   Neurologic: Moves all four extremities. Full range of motion.   Skin: Warm and moist. The patient's skin has normal elasticity and good skin turgor.   Psychiatric: Appropriate mood and affect.  Musculoskeletal: Normal range of motion, normal strength    TELEMETRY: SR 70-80, NSVT    < from: TTE with Doppler (w/Cont) (04.19.21 @ 06:38) >  Conclusions:  1. Mitral clip seen. Mild mitral regurgitation.  Mean  transmitral valve gradient equals 5-6 mm Hg, which is  probably normal/milldy elevated  in the setting of a  Mitracip. HR 90s  2. Minimal aortic regurgitation.  3.  Mild left ventricular enlargement.  4. Endocardial visualization enhanced with intravenous  injection of Ultrasonic Enhancing Agent(Definity).  Severe  global left ventricular systolic dysfunction.  5. Right ventricular enlargement with decreased right  ventricular systolic function.  6. Normal tricuspid valve. Moderate-severe tricuspid  regurgitation.  7. Estimated pulmonary artery systolic pressure equals 57  mm Hg, assuming right atrial pressure equals 8 mm Hg,  consistent with moderate pulmonary pressures.    < end of copied text >      ASSESSMENT/PLAN: 	75y.o F French speaking h/o HTN, HLD, DM, CAD s/p CABG 2014 and prior PCI, severe mitral regurgitation (s/p mitral clip 2019), pulmonary HTN (TTE 2019), HF (EF 21 % June 2019), CKD IV not on dialysis (b/l SCr 2-2.2), hypothyroidism admitted for tachypnea 2/2 acute on chronic HFrEF exacerbation.     - Volume status improved, cr increasing - agree with transition to PO Bumex 2mg BID  - Wean O2 as tolerated - now on 2L NC  - Continue hydralazine/Toprol XL  - dapt/statin for prior stents   - Monitor creatine/lytes  - Anxiety treatment prn per med  - TTE noted - EF 25-30%, decreased RV function, mod-severe TR, mod pulm HTN  - No further inpatient cardiac w/u planned  - Palliative follow up

## 2021-04-25 NOTE — PROVIDER CONTACT NOTE (OTHER) - REASON
PAT x 2.1 secs
patient c/o increase SOB and persistent chest pain
4 beats WCT on cardiac monitor
BG 57
Blood glucose is 401, repeat is 397
Bloody stools
Pt refusing bipap, very anxious
5 beats of WCT on tele monitoring
Patient complaining of chest pain, patient anxious at this time and unable to rate.
patient c/o 10/10 chest pain and increase SOB

## 2021-04-25 NOTE — PROVIDER CONTACT NOTE (OTHER) - RECOMMENDATIONS
Make MD aware, activate hypoglycemia protocol and give IV dextrose. Continue to monitor.
Patient due for metoprolol PO.
make MD aware, teach patient deep and controlled breathing exercises, increased O2 supplementation from 4L to 6L for comfort. Notify provider and continue to monitor.
make MD aware, continue to monitor.
EKG, give Dilaudid PRN for respiratory rate.

## 2021-04-25 NOTE — PROGRESS NOTE ADULT - ATTENDING COMMENTS
Patient seen and examined.  Agree with above.   No further inpatient cardiac workup needed at this time.   Follow up palliative care    Corie Ga MD

## 2021-04-25 NOTE — PROGRESS NOTE ADULT - ASSESSMENT
75y.o F Malay speaking h/o DM, CAD s/p CABG 2014 and prior PCI, severe mitral regurgitation (s/p mitral clip 2019), pulmonary HTN (TTE 2019), HF (EF 21 % June 2019), CKD IV not on dialysis (b/l SCr 2-2.2), admitted for CHF exacerbation, on IV bumex  admitted for tachypnea 2/2 acute on chronic HFrEF exacerbation. not candidate for inpt hospice, dispo decision pending.

## 2021-04-25 NOTE — PROGRESS NOTE ADULT - SUBJECTIVE AND OBJECTIVE BOX
Adrianna Deng PGY1    Patient is a 75y old  Female who presents with a chief complaint of dyspnea (24 Apr 2021 10:59)      SUBJECTIVE / OVERNIGHT EVENTS:  - overnight - had chest pain. ECG done. dilaudid given for sob.   - am -     MEDICATIONS  (STANDING):  aspirin enteric coated 81 milliGRAM(s) Oral daily  atorvastatin 80 milliGRAM(s) Oral at bedtime  buMETAnide 2 milliGRAM(s) Oral two times a day  clopidogrel Tablet 75 milliGRAM(s) Oral daily  dextrose 5%. 1000 milliLiter(s) (50 mL/Hr) IV Continuous <Continuous>  escitalopram 5 milliGRAM(s) Oral daily  heparin   Injectable 5000 Unit(s) SubCutaneous every 8 hours  hydrALAZINE 37.5 milliGRAM(s) Oral three times a day  HYDROmorphone  Injectable 0.2 milliGRAM(s) IV Push every 8 hours  insulin glargine Injectable (LANTUS) 12 Unit(s) SubCutaneous at bedtime  insulin lispro (ADMELOG) corrective regimen sliding scale   SubCutaneous three times a day before meals  insulin lispro (ADMELOG) corrective regimen sliding scale   SubCutaneous at bedtime  insulin lispro Injectable (ADMELOG) 10 Unit(s) SubCutaneous three times a day before meals  levothyroxine 50 MICROGram(s) Oral daily  melatonin 3 milliGRAM(s) Oral at bedtime  metoprolol succinate  milliGRAM(s) Oral daily  pantoprazole    Tablet 40 milliGRAM(s) Oral before breakfast  senna 2 Tablet(s) Oral at bedtime  sodium bicarbonate 650 milliGRAM(s) Oral daily  spironolactone 12.5 milliGRAM(s) Oral daily    MEDICATIONS  (PRN):  glycopyrrolate Injectable 0.4 milliGRAM(s) IV Push every 6 hours PRN Secretions.  HYDROmorphone  Injectable 0.2 milliGRAM(s) IV Push every 3 hours PRN Moderate to Severe pain  HYDROmorphone  Injectable 0.2 milliGRAM(s) IV Push every 3 hours PRN Dyspnea, labored breathing or RR > 28  LORazepam   Injectable 0.2 milliGRAM(s) IV Push every 8 hours PRN Anxiety or intractable respiratory distress.  polyethylene glycol 3350 17 Gram(s) Oral two times a day PRN Constipation      CAPILLARY BLOOD GLUCOSE      POCT Blood Glucose.: 146 mg/dL (24 Apr 2021 21:36)  POCT Blood Glucose.: 215 mg/dL (24 Apr 2021 17:28)  POCT Blood Glucose.: 248 mg/dL (24 Apr 2021 13:45)  POCT Blood Glucose.: 80 mg/dL (24 Apr 2021 08:09)    I&O's Summary    24 Apr 2021 07:01  -  25 Apr 2021 07:00  --------------------------------------------------------  IN: 660 mL / OUT: 1650 mL / NET: -990 mL        Vital Signs Last 24 Hrs  T(C): 36.8 (25 Apr 2021 04:29), Max: 36.8 (25 Apr 2021 00:05)  T(F): 98.2 (25 Apr 2021 04:29), Max: 98.2 (25 Apr 2021 00:05)  HR: 76 (25 Apr 2021 06:32) (66 - 76)  BP: 107/72 (25 Apr 2021 06:32) (95/60 - 107/72)  BP(mean): --  RR: 18 (25 Apr 2021 04:29) (18 - 18)  SpO2: 98% (25 Apr 2021 04:29) (96% - 98%)    PHYSICAL EXAM:  GENERAL: frail elderly lady, mild dyspneic at rest, +orthopnea  HEENT: EOMI   RESPIRATORY: no crackles anteriorly bilaterally  CARDIOVASCULAR: regular rate and rhythm, sinus on telemetry. no edema bilateral LE.   ABDOMEN: Nontender to palpation, normoactive bowel sounds  MUSCULOSKELETAL: able to move all extremities  NEURO: Non-focal, no tremors  PSYCH: anxious-appearing   LABS:                        8.3    10.91 )-----------( 380      ( 24 Apr 2021 06:43 )             28.1      04-24    139  |  96  |  86<H>  ----------------------------<  67<L>  3.8   |  24  |  2.19<H>    Ca    8.7      24 Apr 2021 06:43  Phos  4.0     04-24  Mg     2.2     04-24                RADIOLOGY & ADDITIONAL TESTS:    Imaging Personally Reviewed:    Consultant(s) Notes Reviewed:      Care Discussed with Consultants/Other Providers:   Adrianna Deng PGY1    Patient is a 75y old  Female who presents with a chief complaint of dyspnea (24 Apr 2021 10:59)      SUBJECTIVE / OVERNIGHT EVENTS:  - overnight - had chest pain. ECG done. dilaudid given for sob.   - am - pt appears sad. not dyspneic. on NC oxygen 2L. denies chest pain.     MEDICATIONS  (STANDING):  aspirin enteric coated 81 milliGRAM(s) Oral daily  atorvastatin 80 milliGRAM(s) Oral at bedtime  buMETAnide 2 milliGRAM(s) Oral two times a day  clopidogrel Tablet 75 milliGRAM(s) Oral daily  dextrose 5%. 1000 milliLiter(s) (50 mL/Hr) IV Continuous <Continuous>  escitalopram 5 milliGRAM(s) Oral daily  heparin   Injectable 5000 Unit(s) SubCutaneous every 8 hours  hydrALAZINE 37.5 milliGRAM(s) Oral three times a day  HYDROmorphone  Injectable 0.2 milliGRAM(s) IV Push every 8 hours  insulin glargine Injectable (LANTUS) 12 Unit(s) SubCutaneous at bedtime  insulin lispro (ADMELOG) corrective regimen sliding scale   SubCutaneous three times a day before meals  insulin lispro (ADMELOG) corrective regimen sliding scale   SubCutaneous at bedtime  insulin lispro Injectable (ADMELOG) 10 Unit(s) SubCutaneous three times a day before meals  levothyroxine 50 MICROGram(s) Oral daily  melatonin 3 milliGRAM(s) Oral at bedtime  metoprolol succinate  milliGRAM(s) Oral daily  pantoprazole    Tablet 40 milliGRAM(s) Oral before breakfast  senna 2 Tablet(s) Oral at bedtime  sodium bicarbonate 650 milliGRAM(s) Oral daily  spironolactone 12.5 milliGRAM(s) Oral daily    MEDICATIONS  (PRN):  glycopyrrolate Injectable 0.4 milliGRAM(s) IV Push every 6 hours PRN Secretions.  HYDROmorphone  Injectable 0.2 milliGRAM(s) IV Push every 3 hours PRN Moderate to Severe pain  HYDROmorphone  Injectable 0.2 milliGRAM(s) IV Push every 3 hours PRN Dyspnea, labored breathing or RR > 28  LORazepam   Injectable 0.2 milliGRAM(s) IV Push every 8 hours PRN Anxiety or intractable respiratory distress.  polyethylene glycol 3350 17 Gram(s) Oral two times a day PRN Constipation      CAPILLARY BLOOD GLUCOSE      POCT Blood Glucose.: 146 mg/dL (24 Apr 2021 21:36)  POCT Blood Glucose.: 215 mg/dL (24 Apr 2021 17:28)  POCT Blood Glucose.: 248 mg/dL (24 Apr 2021 13:45)  POCT Blood Glucose.: 80 mg/dL (24 Apr 2021 08:09)    I&O's Summary    24 Apr 2021 07:01  -  25 Apr 2021 07:00  --------------------------------------------------------  IN: 660 mL / OUT: 1650 mL / NET: -990 mL        Vital Signs Last 24 Hrs  T(C): 36.8 (25 Apr 2021 04:29), Max: 36.8 (25 Apr 2021 00:05)  T(F): 98.2 (25 Apr 2021 04:29), Max: 98.2 (25 Apr 2021 00:05)  HR: 76 (25 Apr 2021 06:32) (66 - 76)  BP: 107/72 (25 Apr 2021 06:32) (95/60 - 107/72)  BP(mean): --  RR: 18 (25 Apr 2021 04:29) (18 - 18)  SpO2: 98% (25 Apr 2021 04:29) (96% - 98%)    PHYSICAL EXAM:  GENERAL: frail elderly lady, no dyspnea today, +orthopnea  HEENT: EOMI   RESPIRATORY: no crackles anteriorly bilaterally  CARDIOVASCULAR: regular rate and rhythm, sinus on telemetry. no edema bilateral LE.   ABDOMEN: Nontender to palpation, normoactive bowel sounds  MUSCULOSKELETAL: able to move all extremities  NEURO: Non-focal, no tremors  PSYCH: anxious-appearing   LABS:                        8.3    10.91 )-----------( 380      ( 24 Apr 2021 06:43 )             28.1      04-24    139  |  96  |  86<H>  ----------------------------<  67<L>  3.8   |  24  |  2.19<H>    Ca    8.7      24 Apr 2021 06:43  Phos  4.0     04-24  Mg     2.2     04-24                RADIOLOGY & ADDITIONAL TESTS:    Imaging Personally Reviewed:    Consultant(s) Notes Reviewed:      Care Discussed with Consultants/Other Providers:

## 2021-04-25 NOTE — PROVIDER CONTACT NOTE (OTHER) - BACKGROUND
patient admitted for CHF exacerbation, hx of CHF, hypothyroidism, HLD, T2DM, and CAD
pt admitted with CHF, dyspnea
Pt. came in with CHF exacerbation. PMH CAD, DM2.
Patient admitted for CHF exacerbation, PMH significant for CAD, HTN, HLD, and DM2.
Dx - Heart failure
75F with HFrEF (EF: 20-25%), severe mitral regurgitation s/p mitral clip, severe pulmonary HTN, CKD admitted for HF exacerbation on IV bumex for diuresis
75F with HFrEF (EF: 20-25%), severe mitral regurgitation s/p mitral clip, severe pulmonary HTN, CKD admitted for HF exacerbation on IV bumex for diuresis
Patient admitted for heart failure
patient admitted for CHF exacerbation, hx of CHF, hypothyroidism, HLD, T2DM, and CAD
patient admitted for CHF exacerbation, hx of CHF, hypothyroidism, HLD, T2DM, and CAD.

## 2021-04-25 NOTE — PROGRESS NOTE ADULT - SUBJECTIVE AND OBJECTIVE BOX
[facility] [place] [facility]   MD Dion Dasilva MD, D.O. Ruoru Wong, PA    From 7 AM - 5 PM:  OFFICE: [phone]  Dr. Muhammad cell: [phone]  Dr. Montero cell: [phone]  Dr. Soria cell: [phone]  RASHIDA Smith cell: [phone]    From 5 PM - 7 AM: Answering Service: [phone]  Date of service: 04-25-21 @ 12:02    RENAL FOLLOW UP NOTE  --------------------------------------------------------------------------------  HPI:  Pt seen and examined at bedside.   Denies SOB, chest pain     PAST HISTORY  --------------------------------------------------------------------------------  No significant changes to PMH, PSH, FHx, SHx, unless otherwise noted    ALLERGIES & MEDICATIONS  --------------------------------------------------------------------------------  Allergies    azithromycin (Hives; Pruritus)    Intolerances      Standing Inpatient Medications  aspirin enteric coated 81 milliGRAM(s) Oral daily  atorvastatin 80 milliGRAM(s) Oral at bedtime  buMETAnide 2 milliGRAM(s) Oral two times a day  clopidogrel Tablet 75 milliGRAM(s) Oral daily  dextrose 5%. 1000 milliLiter(s) IV Continuous <Continuous>  escitalopram 5 milliGRAM(s) Oral daily  heparin   Injectable 5000 Unit(s) SubCutaneous every 8 hours  hydrALAZINE 37.5 milliGRAM(s) Oral three times a day  HYDROmorphone  Injectable 0.2 milliGRAM(s) IV Push every 8 hours  insulin glargine Injectable (LANTUS) 12 Unit(s) SubCutaneous at bedtime  insulin lispro (ADMELOG) corrective regimen sliding scale   SubCutaneous three times a day before meals  insulin lispro (ADMELOG) corrective regimen sliding scale   SubCutaneous at bedtime  insulin lispro Injectable (ADMELOG) 10 Unit(s) SubCutaneous three times a day before meals  levothyroxine 50 MICROGram(s) Oral daily  melatonin 3 milliGRAM(s) Oral at bedtime  metoprolol succinate  milliGRAM(s) Oral daily  pantoprazole    Tablet 40 milliGRAM(s) Oral before breakfast  senna 2 Tablet(s) Oral at bedtime  sodium bicarbonate 650 milliGRAM(s) Oral daily  spironolactone 12.5 milliGRAM(s) Oral daily    PRN Inpatient Medications  glycopyrrolate Injectable 0.4 milliGRAM(s) IV Push every 6 hours PRN  HYDROmorphone  Injectable 0.2 milliGRAM(s) IV Push every 3 hours PRN  HYDROmorphone  Injectable 0.2 milliGRAM(s) IV Push every 3 hours PRN  LORazepam   Injectable 0.2 milliGRAM(s) IV Push every 8 hours PRN  polyethylene glycol 3350 17 Gram(s) Oral two times a day PRN      REVIEW OF SYSTEMS  --------------------------------------------------------------------------------  General: no fever  CVS: no chest pain  RESP: no sob, no cough  ABD: no abdominal pain  : no dysuria,  MSK: no edema     VITALS/PHYSICAL EXAM  --------------------------------------------------------------------------------  T(C): 36.8 (04-25-21 @ 04:29), Max: 36.8 (04-25-21 @ 00:05)  HR: 76 (04-25-21 @ 06:32) (66 - 76)  BP: 107/72 (04-25-21 @ 06:32) (95/60 - 107/72)  RR: 18 (04-25-21 @ 04:29) (18 - 18)  SpO2: 98% (04-25-21 @ 04:29) (96% - 98%)  Wt(kg): --        04-24-21 @ 07:01  -  04-25-21 @ 07:00  --------------------------------------------------------  IN: 660 mL / OUT: 1650 mL / NET: -990 mL    04-25-21 @ 07:01  -  04-25-21 @ 12:02  --------------------------------------------------------  IN: 180 mL / OUT: 0 mL / NET: 180 mL      Physical Exam:  	Gen: NAD  	HEENT: MMM  	Pulm: CTA B/L  	CV: S1S2  	Abd: Soft, +BS  	Ext: No LE edema B/L                      Neuro: Awake   	Skin: Warm and Dry   	    LABS/STUDIES  --------------------------------------------------------------------------------              8.3    9.61  >-----------<  351      [04-25-21 @ 07:05]              27.9     136  |  94  |  87  ----------------------------<  97      [04-25-21 @ 07:05]  3.7   |  23  |  2.12        Ca     8.8     [04-25-21 @ 07:05]      Mg     2.1     [04-25-21 @ 07:05]      Phos  3.3     [04-25-21 @ 07:05]      Creatinine Trend:  SCr 2.12 [04-25 @ 07:05]  SCr 2.19 [04-24 @ 06:43]  SCr 2.20 [04-23 @ 07:16]  SCr 1.87 [04-22 @ 07:07]  SCr 1.88 [04-21 @ 21:21]        Ferritin 111      [01-13-21 @ 01:42]  HbA1c 7.5      [10-08-19 @ 14:30]  TSH 1.94      [04-17-21 @ 02:28]

## 2021-04-25 NOTE — PROVIDER CONTACT NOTE (OTHER) - DATE AND TIME:
23-Apr-2021 09:12
17-Apr-2021 12:37
17-Apr-2021 20:00
21-Apr-2021 00:00
24-Apr-2021 20:41
17-Apr-2021 17:30
20-Apr-2021 21:50
18-Apr-2021 20:00
17-Apr-2021 14:30
22-Apr-2021 11:11

## 2021-04-25 NOTE — PROVIDER CONTACT NOTE (OTHER) - ACTION/TREATMENT ORDERED:
16 units of lantus given. 3 units of insulin corrective for bedtime. Repeat blood sugar in 1hr. Repeat was 318. 4 units of additional insulin was given. will continue to monitor.,
MD notified and aware. EKG, give Dilaudid PRN for respiratory rate.
Crow Giles aware, will continue to monitor.
MD aware, MD to bedside, continue to monitor.
MD made aware. Continue to monitor patient.
MD made aware. PO Metoprolol administered as ordered.
CBC ordered STAT, will continue to monitor for s/s of bleeding
MD aware, obtain EKG and continue to monitor.
Make MD aware, activate hypoglycemia protocol and give IV dextrose. Continue to monitor.
Provider @ bedside. 4 L NC ordered, xanax ordered

## 2021-04-26 DIAGNOSIS — R52 PAIN, UNSPECIFIED: ICD-10-CM

## 2021-04-26 DIAGNOSIS — I50.20 UNSPECIFIED SYSTOLIC (CONGESTIVE) HEART FAILURE: ICD-10-CM

## 2021-04-26 DIAGNOSIS — E11.22 TYPE 2 DIABETES MELLITUS WITH DIABETIC CHRONIC KIDNEY DISEASE: ICD-10-CM

## 2021-04-26 LAB
ANION GAP SERPL CALC-SCNC: 17 MMOL/L — SIGNIFICANT CHANGE UP (ref 5–17)
BUN SERPL-MCNC: 91 MG/DL — HIGH (ref 7–23)
CALCIUM SERPL-MCNC: 8.6 MG/DL — SIGNIFICANT CHANGE UP (ref 8.4–10.5)
CHLORIDE SERPL-SCNC: 92 MMOL/L — LOW (ref 96–108)
CO2 SERPL-SCNC: 25 MMOL/L — SIGNIFICANT CHANGE UP (ref 22–31)
CREAT SERPL-MCNC: 2.09 MG/DL — HIGH (ref 0.5–1.3)
GLUCOSE BLDC GLUCOMTR-MCNC: 200 MG/DL — HIGH (ref 70–99)
GLUCOSE BLDC GLUCOMTR-MCNC: 219 MG/DL — HIGH (ref 70–99)
GLUCOSE BLDC GLUCOMTR-MCNC: 245 MG/DL — HIGH (ref 70–99)
GLUCOSE BLDC GLUCOMTR-MCNC: 317 MG/DL — HIGH (ref 70–99)
GLUCOSE SERPL-MCNC: 210 MG/DL — HIGH (ref 70–99)
HCT VFR BLD CALC: 28.9 % — LOW (ref 34.5–45)
HGB BLD-MCNC: 8.5 G/DL — LOW (ref 11.5–15.5)
MAGNESIUM SERPL-MCNC: 2.1 MG/DL — SIGNIFICANT CHANGE UP (ref 1.6–2.6)
MCHC RBC-ENTMCNC: 22.1 PG — LOW (ref 27–34)
MCHC RBC-ENTMCNC: 29.4 GM/DL — LOW (ref 32–36)
MCV RBC AUTO: 75.3 FL — LOW (ref 80–100)
NRBC # BLD: 0 /100 WBCS — SIGNIFICANT CHANGE UP (ref 0–0)
PHOSPHATE SERPL-MCNC: 3.4 MG/DL — SIGNIFICANT CHANGE UP (ref 2.5–4.5)
PLATELET # BLD AUTO: 350 K/UL — SIGNIFICANT CHANGE UP (ref 150–400)
POTASSIUM SERPL-MCNC: 3.7 MMOL/L — SIGNIFICANT CHANGE UP (ref 3.5–5.3)
POTASSIUM SERPL-SCNC: 3.7 MMOL/L — SIGNIFICANT CHANGE UP (ref 3.5–5.3)
RBC # BLD: 3.84 M/UL — SIGNIFICANT CHANGE UP (ref 3.8–5.2)
RBC # FLD: 19.2 % — HIGH (ref 10.3–14.5)
SODIUM SERPL-SCNC: 134 MMOL/L — LOW (ref 135–145)
WBC # BLD: 9.37 K/UL — SIGNIFICANT CHANGE UP (ref 3.8–10.5)
WBC # FLD AUTO: 9.37 K/UL — SIGNIFICANT CHANGE UP (ref 3.8–10.5)

## 2021-04-26 PROCEDURE — 99233 SBSQ HOSP IP/OBS HIGH 50: CPT

## 2021-04-26 RX ORDER — ENOXAPARIN SODIUM 100 MG/ML
30 INJECTION SUBCUTANEOUS DAILY
Refills: 0 | Status: DISCONTINUED | OUTPATIENT
Start: 2021-04-26 | End: 2021-05-04

## 2021-04-26 RX ADMIN — Medication 0.2 MILLIGRAM(S): at 02:50

## 2021-04-26 RX ADMIN — HYDROMORPHONE HYDROCHLORIDE 0.2 MILLIGRAM(S): 2 INJECTION INTRAMUSCULAR; INTRAVENOUS; SUBCUTANEOUS at 11:28

## 2021-04-26 RX ADMIN — Medication 81 MILLIGRAM(S): at 11:05

## 2021-04-26 RX ADMIN — Medication 10 UNIT(S): at 17:20

## 2021-04-26 RX ADMIN — SENNA PLUS 2 TABLET(S): 8.6 TABLET ORAL at 22:17

## 2021-04-26 RX ADMIN — Medication 2: at 22:19

## 2021-04-26 RX ADMIN — ESCITALOPRAM OXALATE 5 MILLIGRAM(S): 10 TABLET, FILM COATED ORAL at 11:04

## 2021-04-26 RX ADMIN — Medication 650 MILLIGRAM(S): at 11:05

## 2021-04-26 RX ADMIN — HYDROMORPHONE HYDROCHLORIDE 0.2 MILLIGRAM(S): 2 INJECTION INTRAMUSCULAR; INTRAVENOUS; SUBCUTANEOUS at 14:18

## 2021-04-26 RX ADMIN — Medication 2: at 12:42

## 2021-04-26 RX ADMIN — HYDROMORPHONE HYDROCHLORIDE 0.2 MILLIGRAM(S): 2 INJECTION INTRAMUSCULAR; INTRAVENOUS; SUBCUTANEOUS at 02:33

## 2021-04-26 RX ADMIN — ENOXAPARIN SODIUM 30 MILLIGRAM(S): 100 INJECTION SUBCUTANEOUS at 11:08

## 2021-04-26 RX ADMIN — Medication 0.5 MILLIGRAM(S): at 22:18

## 2021-04-26 RX ADMIN — BUMETANIDE 2 MILLIGRAM(S): 0.25 INJECTION INTRAMUSCULAR; INTRAVENOUS at 05:26

## 2021-04-26 RX ADMIN — Medication 0.5 MILLIGRAM(S): at 18:19

## 2021-04-26 RX ADMIN — ATORVASTATIN CALCIUM 80 MILLIGRAM(S): 80 TABLET, FILM COATED ORAL at 22:17

## 2021-04-26 RX ADMIN — Medication 1: at 17:20

## 2021-04-26 RX ADMIN — BUMETANIDE 2 MILLIGRAM(S): 0.25 INJECTION INTRAMUSCULAR; INTRAVENOUS at 17:19

## 2021-04-26 RX ADMIN — SPIRONOLACTONE 12.5 MILLIGRAM(S): 25 TABLET, FILM COATED ORAL at 05:27

## 2021-04-26 RX ADMIN — INSULIN GLARGINE 12 UNIT(S): 100 INJECTION, SOLUTION SUBCUTANEOUS at 22:17

## 2021-04-26 RX ADMIN — Medication 3 MILLIGRAM(S): at 22:17

## 2021-04-26 RX ADMIN — HYDROMORPHONE HYDROCHLORIDE 0.2 MILLIGRAM(S): 2 INJECTION INTRAMUSCULAR; INTRAVENOUS; SUBCUTANEOUS at 11:15

## 2021-04-26 RX ADMIN — PANTOPRAZOLE SODIUM 40 MILLIGRAM(S): 20 TABLET, DELAYED RELEASE ORAL at 05:28

## 2021-04-26 RX ADMIN — Medication 10 UNIT(S): at 08:10

## 2021-04-26 RX ADMIN — Medication 50 MICROGRAM(S): at 05:26

## 2021-04-26 RX ADMIN — HYDROMORPHONE HYDROCHLORIDE 0.2 MILLIGRAM(S): 2 INJECTION INTRAMUSCULAR; INTRAVENOUS; SUBCUTANEOUS at 22:18

## 2021-04-26 RX ADMIN — CLOPIDOGREL BISULFATE 75 MILLIGRAM(S): 75 TABLET, FILM COATED ORAL at 11:05

## 2021-04-26 RX ADMIN — Medication 2: at 08:09

## 2021-04-26 RX ADMIN — HEPARIN SODIUM 5000 UNIT(S): 5000 INJECTION INTRAVENOUS; SUBCUTANEOUS at 05:25

## 2021-04-26 RX ADMIN — Medication 10 UNIT(S): at 12:44

## 2021-04-26 RX ADMIN — HYDROMORPHONE HYDROCHLORIDE 0.2 MILLIGRAM(S): 2 INJECTION INTRAMUSCULAR; INTRAVENOUS; SUBCUTANEOUS at 05:27

## 2021-04-26 NOTE — DIETITIAN INITIAL EVALUATION ADULT. - PERTINENT MEDS FT
MEDICATIONS  (STANDING):  aspirin enteric coated 81 milliGRAM(s) Oral daily  atorvastatin 80 milliGRAM(s) Oral at bedtime  buMETAnide 2 milliGRAM(s) Oral two times a day  clopidogrel Tablet 75 milliGRAM(s) Oral daily  dextrose 5%. 1000 milliLiter(s) (50 mL/Hr) IV Continuous <Continuous>  enoxaparin Injectable 30 milliGRAM(s) SubCutaneous daily  escitalopram 5 milliGRAM(s) Oral daily  hydrALAZINE 37.5 milliGRAM(s) Oral three times a day  HYDROmorphone  Injectable 0.2 milliGRAM(s) IV Push every 8 hours  insulin glargine Injectable (LANTUS) 12 Unit(s) SubCutaneous at bedtime  insulin lispro (ADMELOG) corrective regimen sliding scale   SubCutaneous three times a day before meals  insulin lispro (ADMELOG) corrective regimen sliding scale   SubCutaneous at bedtime  insulin lispro Injectable (ADMELOG) 10 Unit(s) SubCutaneous three times a day before meals  levothyroxine 50 MICROGram(s) Oral daily  melatonin 3 milliGRAM(s) Oral at bedtime  metoprolol succinate  milliGRAM(s) Oral daily  pantoprazole    Tablet 40 milliGRAM(s) Oral before breakfast  senna 2 Tablet(s) Oral at bedtime  sodium bicarbonate 650 milliGRAM(s) Oral daily  spironolactone 12.5 milliGRAM(s) Oral daily    MEDICATIONS  (PRN):  glycopyrrolate Injectable 0.4 milliGRAM(s) IV Push every 6 hours PRN Secretions.  HYDROmorphone  Injectable 0.2 milliGRAM(s) IV Push every 3 hours PRN Moderate to Severe pain  HYDROmorphone  Injectable 0.2 milliGRAM(s) IV Push every 3 hours PRN Dyspnea, labored breathing or RR > 28  LORazepam   Injectable 0.5 milliGRAM(s) IV Push every 1 hour PRN Anxiety or intractable respiratory distress.  polyethylene glycol 3350 17 Gram(s) Oral two times a day PRN Constipation

## 2021-04-26 NOTE — DIETITIAN INITIAL EVALUATION ADULT. - PROBLEM SELECTOR PLAN 2
TTE 1/13 showed EF of 20% with global LV and RV dysfunction w/ MS.   Repeat TTE 1/19 showed severely decreased LV fx and grossly decreased RV fx w/ EF 25-30%  - Heart failure and cardiology consult     - continue with home HF medications including metoprolol, hydralazine  - start aldactone as recommended prior to dc from HF team

## 2021-04-26 NOTE — DIETITIAN INITIAL EVALUATION ADULT. - ORAL INTAKE PTA/DIET HISTORY
Limited nutrition history obtained at this time as pt was eating during interview and then wanted to rest. Per chart, pt seen by RD in January 2021 and was eating well at that time. NKFA noted per chart. Noted A1C of 8.2%, indicating fair glycemic control considering age. Noted pt follows halal dietary laws.

## 2021-04-26 NOTE — PROGRESS NOTE ADULT - SUBJECTIVE AND OBJECTIVE BOX
[facility]   MD Dion Dasilva MD, D.O. Ruoru Wong, PA    From 7 AM - 5 PM:  OFFICE: [phone]  Dr. Muhammad cell: [phone]  Dr. Montero cell: [phone]  Dr. Soria cell: [phone]  RASHIDA Smith cell: [phone]    From 5 PM - 7 AM: Answering Service: [phone]  Date of service: 04-26-21 @ 09:55    RENAL FOLLOW UP NOTE  --------------------------------------------------------------------------------  HPI:  Pt seen and examined at bedside. Anxious     PAST HISTORY  --------------------------------------------------------------------------------  No significant changes to PMH, PSH, FHx, SHx, unless otherwise noted    ALLERGIES & MEDICATIONS  --------------------------------------------------------------------------------  Allergies    azithromycin (Hives; Pruritus)    Intolerances      Standing Inpatient Medications  aspirin enteric coated 81 milliGRAM(s) Oral daily  atorvastatin 80 milliGRAM(s) Oral at bedtime  buMETAnide 2 milliGRAM(s) Oral two times a day  clopidogrel Tablet 75 milliGRAM(s) Oral daily  dextrose 5%. 1000 milliLiter(s) IV Continuous <Continuous>  enoxaparin Injectable 30 milliGRAM(s) SubCutaneous daily  escitalopram 5 milliGRAM(s) Oral daily  hydrALAZINE 37.5 milliGRAM(s) Oral three times a day  HYDROmorphone  Injectable 0.2 milliGRAM(s) IV Push every 8 hours  insulin glargine Injectable (LANTUS) 12 Unit(s) SubCutaneous at bedtime  insulin lispro (ADMELOG) corrective regimen sliding scale   SubCutaneous three times a day before meals  insulin lispro (ADMELOG) corrective regimen sliding scale   SubCutaneous at bedtime  insulin lispro Injectable (ADMELOG) 10 Unit(s) SubCutaneous three times a day before meals  levothyroxine 50 MICROGram(s) Oral daily  melatonin 3 milliGRAM(s) Oral at bedtime  metoprolol succinate  milliGRAM(s) Oral daily  pantoprazole    Tablet 40 milliGRAM(s) Oral before breakfast  senna 2 Tablet(s) Oral at bedtime  sodium bicarbonate 650 milliGRAM(s) Oral daily  spironolactone 12.5 milliGRAM(s) Oral daily    PRN Inpatient Medications  glycopyrrolate Injectable 0.4 milliGRAM(s) IV Push every 6 hours PRN  HYDROmorphone  Injectable 0.2 milliGRAM(s) IV Push every 3 hours PRN  HYDROmorphone  Injectable 0.2 milliGRAM(s) IV Push every 3 hours PRN  LORazepam   Injectable 0.2 milliGRAM(s) IV Push every 1 hour PRN  polyethylene glycol 3350 17 Gram(s) Oral two times a day PRN      REVIEW OF SYSTEMS  --------------------------------------------------------------------------------  General: no fever  CVS: no chest pain  RESP: no sob, no cough  ABD: no abdominal pain  : no dysuria,  MSK: no edema     VITALS/PHYSICAL EXAM  --------------------------------------------------------------------------------  T(C): 36.4 (04-26-21 @ 07:33), Max: 36.6 (04-25-21 @ 11:56)  HR: 70 (04-26-21 @ 07:33) (70 - 88)  BP: 93/57 (04-26-21 @ 07:33) (92/59 - 115/77)  RR: 20 (04-26-21 @ 07:33) (18 - 22)  SpO2: 98% (04-26-21 @ 07:33) (95% - 98%)  Wt(kg): --    Weight (kg): 55 (04-26-21 @ 07:33)      04-25-21 @ 07:01  -  04-26-21 @ 07:00  --------------------------------------------------------  IN: 300 mL / OUT: 700 mL / NET: -400 mL      Physical Exam:  	Gen: NAD  	HEENT: MMM  	Pulm: CTA B/L  	CV: S1S2  	Abd: Soft, +BS  	Ext: No LE edema B/L                      Neuro: Awake   	Skin: Warm and Dry   	    LABS/STUDIES  --------------------------------------------------------------------------------              8.5    9.37  >-----------<  350      [04-26-21 @ 05:47]              28.9     134  |  92  |  91  ----------------------------<  210      [04-26-21 @ 05:47]  3.7   |  25  |  2.09        Ca     8.6     [04-26-21 @ 05:47]      Mg     2.1     [04-26-21 @ 05:47]      Phos  3.4     [04-26-21 @ 05:47]            Creatinine Trend:  SCr 2.09 [04-26 @ 05:47]  SCr 2.12 [04-25 @ 07:05]  SCr 2.19 [04-24 @ 06:43]  SCr 2.20 [04-23 @ 07:16]  SCr 1.87 [04-22 @ 07:07]        Ferritin 111      [01-13-21 @ 01:42]  HbA1c 7.5      [10-08-19 @ 14:30]  TSH 1.94      [04-17-21 @ 02:28]

## 2021-04-26 NOTE — DIETITIAN INITIAL EVALUATION ADULT. - PROBLEM SELECTOR PLAN 1
Pt presented with dyspnea and found to requiring high amounts of oxygen in ED improved with diuresis. Per family, on o2 at home 2L. Likely 2/2 CHF exacerbation.  EF 20% w/ mitral stenosis.  CXR on admission showed bilateral predominantly lower lobe hazy opacities which is likely 2/2 fluid. No white count or clinical signs of pneumonia. Similar admission in January requiring milrinone assisted diuresis. Unstable angina also on differential    - bumex 2mg IV BID  - trend lytes BID while on high dose of diuretics  - HF consult  - TTE

## 2021-04-26 NOTE — PROGRESS NOTE ADULT - SUBJECTIVE AND OBJECTIVE BOX
GAP TEAM PALLIATIVE CARE UNIT PROGRESS NOTE:      [  ] Patient on hospice program.    INDICATION FOR PALLIATIVE CARE UNIT SERVICES: Symptomatic management for advanced HF and dyspnea, transition to end of life care    INTERVAL HPI/OVERNIGHT EVENTS: Overnight pt required 1 dose ativan for anxiety adn 2 doses of dilaudid for dyspnea. Pt speaks adequate english and refused  services.    DNR on chart: Yes. DNR/DNI      Allergies    azithromycin (Hives; Pruritus)    Intolerances    MEDICATIONS  (STANDING):  aspirin enteric coated 81 milliGRAM(s) Oral daily  atorvastatin 80 milliGRAM(s) Oral at bedtime  buMETAnide 2 milliGRAM(s) Oral two times a day  clopidogrel Tablet 75 milliGRAM(s) Oral daily  dextrose 5%. 1000 milliLiter(s) (50 mL/Hr) IV Continuous <Continuous>  enoxaparin Injectable 30 milliGRAM(s) SubCutaneous daily  escitalopram 5 milliGRAM(s) Oral daily  hydrALAZINE 37.5 milliGRAM(s) Oral three times a day  HYDROmorphone  Injectable 0.2 milliGRAM(s) IV Push every 8 hours  insulin glargine Injectable (LANTUS) 12 Unit(s) SubCutaneous at bedtime  insulin lispro (ADMELOG) corrective regimen sliding scale   SubCutaneous three times a day before meals  insulin lispro (ADMELOG) corrective regimen sliding scale   SubCutaneous at bedtime  insulin lispro Injectable (ADMELOG) 10 Unit(s) SubCutaneous three times a day before meals  levothyroxine 50 MICROGram(s) Oral daily  melatonin 3 milliGRAM(s) Oral at bedtime  metoprolol succinate  milliGRAM(s) Oral daily  pantoprazole    Tablet 40 milliGRAM(s) Oral before breakfast  senna 2 Tablet(s) Oral at bedtime  sodium bicarbonate 650 milliGRAM(s) Oral daily  spironolactone 12.5 milliGRAM(s) Oral daily    MEDICATIONS  (PRN):  glycopyrrolate Injectable 0.4 milliGRAM(s) IV Push every 6 hours PRN Secretions.  HYDROmorphone  Injectable 0.2 milliGRAM(s) IV Push every 3 hours PRN Moderate to Severe pain  HYDROmorphone  Injectable 0.2 milliGRAM(s) IV Push every 3 hours PRN Dyspnea, labored breathing or RR > 28  LORazepam   Injectable 0.5 milliGRAM(s) IV Push every 1 hour PRN Anxiety or intractable respiratory distress.  polyethylene glycol 3350 17 Gram(s) Oral two times a day PRN Constipation    ITEMS UNCHECKED ARE NOT PRESENT    PRESENT SYMPTOMS: [ ]Unable to obtain due to poor mentation   Source if other than patient:  [ ]Family   [ ]Team     Pain: [ ] yes [x ] no  QOL impact -   Location -                    Aggravating factors -  Quality -  Radiation -  Timing-  Severity (0-10 scale):  Minimal acceptable level (0-10 scale):     Dyspnea:                           [ ]Mild [x ]Moderate [ ]Severe  Anxiety:                             [x ]Mild [ ]Moderate [ ]Severe  Fatigue:                             [ ]Mild [ ]Moderate [ ]Severe  Nausea:                             [ ]Mild [ ]Moderate [ ]Severe  Loss of appetite:              [ ]Mild [ ]Moderate [ ]Severe  Constipation:                    [ ]Mild [ ]Moderate [ ]Severe    PAINAD Score: 3    http://geriatrictoolkit.[email or web address]/painad.pdf (Ctrl +  left click to view)  		  Other Symptoms:  [x ]All other review of systems negative     Palliative Performance Status Version 2:    40     %         http://npcrc.org/files/news/palliative_performance_scale_ppsv2.pdf  PHYSICAL EXAM:  Vital Signs Last 24 Hrs  T(C): 36.4 (26 Apr 2021 07:33), Max: 36.6 (25 Apr 2021 11:56)  T(F): 97.5 (26 Apr 2021 07:33), Max: 97.9 (25 Apr 2021 11:56)  HR: 70 (26 Apr 2021 07:33) (70 - 88)  BP: 93/57 (26 Apr 2021 07:33) (92/59 - 115/77)  BP(mean): --  RR: 20 (26 Apr 2021 07:33) (18 - 22)  SpO2: 98% (26 Apr 2021 07:33) (95% - 98%) I&O's Summary    25 Apr 2021 07:01  -  26 Apr 2021 07:00  --------------------------------------------------------  IN: 300 mL / OUT: 700 mL / NET: -400 mL    GENERAL:  [x ]Alert  [ ]Oriented x   [ ]Lethargic  [ ]Cachexia  [ ]Unarousable  [ x]Verbal  [ ]Non-Verbal  Behavioral:   [ x] Anxiety  [ ] Delirium [ ] Agitation [ ] Other  HEENT:  [x ]Normal   [ ]Dry mouth   [ ]ET Tube/Trach  [ ]Oral lesions  PULMONARY:   [ ]Clear [x ]Tachypnea  [ ]Audible excessive secretions   [ ]Rhonchi        [ ]Right [ ]Left [ ]Bilateral  [ x]Crackles        [ x]Right [ ]Left [ ]Bilateral  [ ]Wheezing     [ ]Right [ ]Left [ ]Bilateral  [ ]Diminshed BS [ ]Right [ ]Left [ ]Bilateral    CARDIOVASCULAR:    [ x]Regular [ ]Irregular [ ]Tachy  [ ]Ramesh [ ]Murmur [ ]Other  GASTROINTESTINAL:  [ x]Soft  [ ]Distended   [ x]+BS  [x ]Non tender [ ]Tender  [ ]PEG [ ]OGT/ NGT   Last BM: 4/25 11PM      GENITOURINARY:  [ ]Normal [x ] Incontinent   [ ]Oliguria/Anuria   [ ]Oscar  MUSCULOSKELETAL:   [ ]Normal   [ x]Weakness  [ ]Bed/Wheelchair bound [ ]Edema  NEUROLOGIC:   [ x]No focal deficits  [ ] Cognitive impairment  [ ] Dysphagia [ ]Dysarthria [ ] Paresis [ ]Other   SKIN:   [ x]Normal  [ ]Rash     [ ]Pressure ulcer(s)  [ ]y [x ]n  Present on admission      CRITICAL CARE:  [ ] Shock Present  [ ]Septic [ ]Cardiogenic [ ]Neurologic [ ]Hypovolemic  [ ]  Vasopressors [ ]  Inotropes   [ ] Respiratory failure present [ ] Mechanical Ventilation [ ] Non-invasive ventilatory support [ ] High-Flow  [ ] Acute  [ ] Chronic [ ] Hypoxic  [ ] Hypercarbic [ ] Other  [ ] Other organ failure     LABS:                        8.5    9.37  )-----------( 350      ( 26 Apr 2021 05:47 )             28.9   04-26    134<L>  |  92<L>  |  91<H>  ----------------------------<  210<H>  3.7   |  25  |  2.09<H>    Ca    8.6      26 Apr 2021 05:47  Phos  3.4     04-26  Mg     2.1     04-26          RADIOLOGY & ADDITIONAL STUDIES:    PROTEIN CALORIE MALNUTRITION: [ ] mild [ ] moderate [ ] severe  [ ] underweight [ ] morbid obesity    https://www.andeal.org/vault/2940/web/files/ONC/Table_Clinical%20Characteristics%20to%20Document%20Malnutrition-White%20JV%20et%20al%202012.pdf    Height (cm): 149.9 (04-16-21 @ 22:31), 149.9 (01-13-21 @ 01:15)  Weight (kg): 55 (04-26-21 @ 07:33), 53 (01-25-21 @ 13:11)  BMI (kg/m2): 24.5 (04-26-21 @ 07:33), 23.6 (04-16-21 @ 22:31), 23.6 (01-25-21 @ 13:11)    [ ] PPSV2 < or = 30% [ ] significant weight loss [ ] poor nutritional intake [ ] anasarca Albumin, Serum: 3.4 g/dL (04-19-21 @ 01:13)    Artificial Nutrition [ ]     REFERRALS:   [ ]Chaplaincy  [x ] Hospice  [ ]Child Life  [x ]Social Work  [ ]Case management [ ]Holistic Therapy [ ] Physical Therapy [ ] Dietary   Goals of Care Document: DNR/DNI in chart

## 2021-04-26 NOTE — DIETITIAN INITIAL EVALUATION ADULT. - FACTORS AFF FOOD INTAKE
as per RN, pt was able to eat 75% of breakfast this morning and consumed about 50% of lunch today; no chewing/swallowing issues noted at this time

## 2021-04-26 NOTE — PROGRESS NOTE ADULT - SUBJECTIVE AND OBJECTIVE BOX
S: No distress, denies chest pain or SOB currently. Review of systems otherwise (-)    Review of Systems:   Constitutional: [ ] fevers, [ ] chills.   Skin: [ ] dry skin. [ ] rashes.  Psychiatric: [ ] depression, [ ] anxiety.   Gastrointestinal: [ ] BRBPR, [ ] melena.   Neurological: [ ] confusion. [ ] seizures. [ ] shuffling gait.   Ears,Nose,Mouth and Throat: [ ] ear pain [ ] sore throat.   Eyes: [ ] diplopia.   Respiratory: [ ] hemoptysis. [ ] shortness of breath  Cardiovascular: See HPI above  Hematologic/Lymphatic: [ ] anemia. [ ] painful nodes. [ ] prolonged bleeding.   Genitourinary: [ ] hematuria. [ ] flank pain.   Endocrine: [ ] significant change in weight. [ ] intolerance to heat and cold.     Review of systems [x ] otherwise negative, [ ] otherwise unable to obtain    FH: no family history of sudden cardiac death in first degree relatives    SH: [ ] tobacco, [ ] alcohol, [ ] drugs       MEDICATIONS  (STANDING):  aspirin enteric coated 81 milliGRAM(s) Oral daily  atorvastatin 80 milliGRAM(s) Oral at bedtime  buMETAnide 2 milliGRAM(s) Oral two times a day  clopidogrel Tablet 75 milliGRAM(s) Oral daily  dextrose 5%. 1000 milliLiter(s) (50 mL/Hr) IV Continuous <Continuous>  enoxaparin Injectable 30 milliGRAM(s) SubCutaneous daily  escitalopram 5 milliGRAM(s) Oral daily  hydrALAZINE 37.5 milliGRAM(s) Oral three times a day  HYDROmorphone  Injectable 0.2 milliGRAM(s) IV Push every 8 hours  insulin glargine Injectable (LANTUS) 12 Unit(s) SubCutaneous at bedtime  insulin lispro (ADMELOG) corrective regimen sliding scale   SubCutaneous three times a day before meals  insulin lispro (ADMELOG) corrective regimen sliding scale   SubCutaneous at bedtime  insulin lispro Injectable (ADMELOG) 10 Unit(s) SubCutaneous three times a day before meals  levothyroxine 50 MICROGram(s) Oral daily  melatonin 3 milliGRAM(s) Oral at bedtime  metoprolol succinate  milliGRAM(s) Oral daily  pantoprazole    Tablet 40 milliGRAM(s) Oral before breakfast  senna 2 Tablet(s) Oral at bedtime  sodium bicarbonate 650 milliGRAM(s) Oral daily  spironolactone 12.5 milliGRAM(s) Oral daily    MEDICATIONS  (PRN):  glycopyrrolate Injectable 0.4 milliGRAM(s) IV Push every 6 hours PRN Secretions.  HYDROmorphone  Injectable 0.2 milliGRAM(s) IV Push every 3 hours PRN Moderate to Severe pain  HYDROmorphone  Injectable 0.2 milliGRAM(s) IV Push every 3 hours PRN Dyspnea, labored breathing or RR > 28  LORazepam   Injectable 0.5 milliGRAM(s) IV Push every 1 hour PRN Anxiety or intractable respiratory distress.  polyethylene glycol 3350 17 Gram(s) Oral two times a day PRN Constipation      LABS:                          8.5    9.37  )-----------( 350      ( 26 Apr 2021 05:47 )             28.9     Hemoglobin: 8.5 g/dL (04-26 @ 05:47)  Hemoglobin: 8.3 g/dL (04-25 @ 07:05)  Hemoglobin: 8.3 g/dL (04-24 @ 06:43)  Hemoglobin: 8.0 g/dL (04-23 @ 07:17)  Hemoglobin: 8.4 g/dL (04-22 @ 07:19)    04-26    134<L>  |  92<L>  |  91<H>  ----------------------------<  210<H>  3.7   |  25  |  2.09<H>    Ca    8.6      26 Apr 2021 05:47  Phos  3.4     04-26  Mg     2.1     04-26      Creatinine Trend: 2.09<--, 2.12<--, 2.19<--, 2.20<--, 1.87<--, 1.88<--             04-25-21 @ 07:01  -  04-26-21 @ 07:00  --------------------------------------------------------  IN: 300 mL / OUT: 700 mL / NET: -400 mL        PHYSICAL EXAM  Vital Signs Last 24 Hrs  T(C): 36.4 (26 Apr 2021 07:33), Max: 36.5 (25 Apr 2021 21:41)  T(F): 97.5 (26 Apr 2021 07:33), Max: 97.7 (25 Apr 2021 21:41)  HR: 70 (26 Apr 2021 07:33) (70 - 88)  BP: 93/57 (26 Apr 2021 07:33) (92/59 - 103/63)  BP(mean): --  RR: 20 (26 Apr 2021 07:33) (18 - 22)  SpO2: 98% (26 Apr 2021 07:33) (95% - 98%)    General: NAD  Head: Normocephalic and atraumatic.   Neck: No JVD. No bruits. Supple. Does not appear to be enlarged.   Cardiovascular: + S1,S2 ; RRR Soft systolic murmur at the left lower sternal border. No rubs noted.    Lungs: CTA b/l. No rhonchi, rales or wheezes.   Abdomen: + BS, soft. Non tender. Non distended. No rebound. No guarding.   Extremities: No clubbing/cyanosis/edema.   Neurologic: Moves all four extremities. Full range of motion.   Skin: Warm and moist. The patient's skin has normal elasticity and good skin turgor.   Psychiatric: Appropriate mood and affect.  Musculoskeletal: Normal range of motion, normal strength    TELEMETRY: None    < from: TTE with Doppler (w/Cont) (04.19.21 @ 06:38) >  Conclusions:  1. Mitral clip seen. Mild mitral regurgitation.  Mean  transmitral valve gradient equals 5-6 mm Hg, which is  probably normal/milldy elevated  in the setting of a  Mitracip. HR 90s  2. Minimal aortic regurgitation.  3.  Mild left ventricular enlargement.  4. Endocardial visualization enhanced with intravenous  injection of Ultrasonic Enhancing Agent(Definity).  Severe  global left ventricular systolic dysfunction.  5. Right ventricular enlargement with decreased right  ventricular systolic function.  6. Normal tricuspid valve. Moderate-severe tricuspid  regurgitation.  7. Estimated pulmonary artery systolic pressure equals 57  mm Hg, assuming right atrial pressure equals 8 mm Hg,  consistent with moderate pulmonary pressures.    < end of copied text >      ASSESSMENT/PLAN: 	75y.o F Korean speaking h/o HTN, HLD, DM, CAD s/p CABG 2014 and prior PCI, severe mitral regurgitation (s/p mitral clip 2019), pulmonary HTN (TTE 2019), HF (EF 21 % June 2019), CKD IV not on dialysis (b/l SCr 2-2.2), hypothyroidism admitted for tachypnea 2/2 acute on chronic HFrEF exacerbation.     - Events noted, transferred to PCU  - Volume status improved, continue PO bumex  - Wean O2 as tolerated  - Continue hydralazine/Toprol XL  - dapt/statin for prior stents   - Anxiety treatment prn per med  - TTE noted - EF 25-30%, decreased RV function, mod-severe TR, mod pulm HTN  - No further inpatient cardiac w/u planned  - Palliative follow up appreciated  - Given GOC, will sign off. Please feel free to reconsult if any questions/concerns    Amauri Hill PA-C  Pager: [phone]

## 2021-04-26 NOTE — PROGRESS NOTE ADULT - PROBLEM SELECTOR PLAN 1
Pt very anxious and anxiety is major component of dyspnea  - increased to ativan 0.5 IV prn q1hr  - reassure, continue to monitor Pt very anxious and anxiety is likely a significant component of dyspnea  - increased to ativan 0.5 IV prn q1hr since initial dosing did not confer much clinical impact  - reassure, continue to monitor

## 2021-04-26 NOTE — DIETITIAN INITIAL EVALUATION ADULT. - OTHER INFO
Noted per previous RD note from January, UBW of about 110 pounds, noted dosing wt of 121 pounds, likely elevated in setting of HF.     Noted pt and family well aware of dietary restrictions and precautions to take c HF. Noted STAR CHF education was attempted to be provided earlier in admission, education inappropriate at this time.     Pt has been taking no sugar added Mighty Shakes which were added during previous admission and seems to like them, observed pt drink some.     Pt declined any specific food preferences at this time.

## 2021-04-26 NOTE — PROGRESS NOTE ADULT - ATTENDING COMMENTS
Patient seen and examined.  Agree with above.   No further inpatient cardiac workup needed at this time.   Will sign off for now; please call with questions or concerns.  Thank you for allowing us to participate in the care of your patient.     Corie Ga MD

## 2021-04-26 NOTE — HOSPICE CARE NOTE - CONVESATION DETAILS
As per TC w/PCU NP Kari Pinto Pt is pending [facility] conversation, d/c plan.    HCN RN will continue to follow.  Lisa aKuffman RN  [phone]

## 2021-04-26 NOTE — PROGRESS NOTE ADULT - PROBLEM SELECTOR PLAN 4
Recurrent ADHF exacerbations, admitted for ADHF now better controlled after diuresis, currently euvolemic but will continue to montior. TTE 4/19: EF 25-30%, mild MR with clip, severe global LV dysfunction, decreased RV dysfunction, mod-severe TR, mod pulm pressures.   - c/w bumex 2mg BID  - spironolactone 12.5mg daily  - c/w hydralazine, metoprolol  - strict I/O

## 2021-04-26 NOTE — PROGRESS NOTE ADULT - ASSESSMENT
75y.o F Sinhala speaking h/o HTN, HLD, DM, CAD s/p CABG 2014 and prior PCI, severe mitral regurgitation (s/p mitral clip 2019), pulmonary HTN (TTE 2019), HF (EF 21 % June 2019), CKD IV not on dialysis (b/l SCr 2-2.2), hypothyroidism admitted w/ SOB.     MERVIN on CKD stage 4  - baseline Cr 1.9-2.2; has had recurrent MERVIN's over the years  - CKD is likely 2/2 recurrent MERVIN's + underlying DM/HTN  - Renal function stable  - Pt now comfort care in PCU. Will sign off. Reconsult as needed   - Continue Diuretics per cardiology. now on oral   - Avoid hypotension   - Avoid nephrotoxins including NSAIDs, IV contrast (if possible), Fleet[employer] products  - monitor I/O's accurately    Acidosis  serum bicarb at goal for CKD   Continue sodium bicarb daily  MOnitor serum CO2    HTN  BP controlled  avoid hypotension  Low salt diet   MOnitor BP    SOB  likely sec to CHF  Improved  Follow up Cardiology  Monitor daily weights     Hypokalemia  sec to diuretics  improved   MOnitor serm K

## 2021-04-26 NOTE — PROGRESS NOTE ADULT - PROBLEM SELECTOR PLAN 3
Pain currently controlled, not in acute pain  - c/w dialudid 0.2mg IV q3hrs prn for pain  - miralax  - continue to monitor Pain currently controlled, not in acute pain  -continue to monitor for changes  - c/w dialudid 0.2mg IV q3hrs prn for pain  - miralax  - continue to monitor

## 2021-04-26 NOTE — DIETITIAN INITIAL EVALUATION ADULT. - PERTINENT LABORATORY DATA
04-26 @ 05:47: Na 134<L>, BUN 91<H>, Cr 2.09<H>, <H>, K+ 3.7, Phos 3.4, Mg 2.1, Alk Phos --, ALT/SGPT --, AST/SGOT --, HbA1c --    CAPILLARY BLOOD GLUCOSE      POCT Blood Glucose.: 219 mg/dL (26 Apr 2021 12:33)  POCT Blood Glucose.: 245 mg/dL (26 Apr 2021 07:51)  POCT Blood Glucose.: 203 mg/dL (25 Apr 2021 21:49)  POCT Blood Glucose.: 170 mg/dL (25 Apr 2021 17:18)    4/18: A1C 8.2%

## 2021-04-26 NOTE — PROGRESS NOTE ADULT - PROBLEM SELECTOR PLAN 8
Pt does not have full understanding of her condition, decision makers are daughter and son-in-law. Pt DNR/DNI. They are interested in pursuing a focus on symptom management rather than extending life and interested in pursuing hospice as an option. Inpatient hospice referral made, although pt not deemed a candidate. Family does not want hospice at home, but would be willing to consider nursing home with hospice or another institution. SW consulted

## 2021-04-26 NOTE — DIETITIAN INITIAL EVALUATION ADULT. - ADD RECOMMEND
Will continue to provide no sugar added Mighty Shakes. Continue to provide assistance and encouragement c all meals and snacks.

## 2021-04-26 NOTE — PROGRESS NOTE ADULT - PROBLEM SELECTOR PLAN 2
components of anxiety and baseline HF, currently euvolemic  - dilaudid 0.2mg IVP q8hrs standing  - dilaudid 0.2mg IV q3hrs prn dyspnea  - 2L NC, maintain SpO2 >94%  - Miralax BID prn in setting of dilaudid use  - robinul 0.4mg IV q6hrs prn increased secretions components of anxiety and baseline HF, currently euvolemic  - dilaudid 0.2mg IVP q8hrs standing  - dilaudid 0.2mg IV q3hrs prn dyspnea  - 2L NC, maintain SpO2 >94%  - Miralax BID prn in setting of dilaudid use, ensure senna   - robinul 0.4mg IV q6hrs prn for increased secretions

## 2021-04-26 NOTE — PROGRESS NOTE ADULT - ATTENDING COMMENTS
HPI:  PI: [id number]  75y.o F Telugu speaking h/o HTN, HLD, DM, CAD s/p CABG 2014 and prior PCI, severe mitral regurgitation (s/p mitral clip 2019), pulmonary HTN (TTE 2019), HF (EF 21 % June 2019), CKD IV not on dialysis (b/l SCr 2-2.2), hypothyroidism comes to the Ed with 7 days of SOB here in the PCU for symptom management.  #dyspnea-well controlled  #anxiety-low dose ativan, consider chaplaincy referral

## 2021-04-27 LAB
GLUCOSE BLDC GLUCOMTR-MCNC: 200 MG/DL — HIGH (ref 70–99)
GLUCOSE BLDC GLUCOMTR-MCNC: 231 MG/DL — HIGH (ref 70–99)
GLUCOSE BLDC GLUCOMTR-MCNC: 250 MG/DL — HIGH (ref 70–99)
GLUCOSE BLDC GLUCOMTR-MCNC: 279 MG/DL — HIGH (ref 70–99)

## 2021-04-27 PROCEDURE — 99232 SBSQ HOSP IP/OBS MODERATE 35: CPT

## 2021-04-27 RX ADMIN — HYDROMORPHONE HYDROCHLORIDE 0.2 MILLIGRAM(S): 2 INJECTION INTRAMUSCULAR; INTRAVENOUS; SUBCUTANEOUS at 22:41

## 2021-04-27 RX ADMIN — Medication 650 MILLIGRAM(S): at 13:05

## 2021-04-27 RX ADMIN — Medication 10 UNIT(S): at 12:37

## 2021-04-27 RX ADMIN — Medication 0.5 MILLIGRAM(S): at 17:36

## 2021-04-27 RX ADMIN — INSULIN GLARGINE 12 UNIT(S): 100 INJECTION, SOLUTION SUBCUTANEOUS at 22:42

## 2021-04-27 RX ADMIN — ENOXAPARIN SODIUM 30 MILLIGRAM(S): 100 INJECTION SUBCUTANEOUS at 12:36

## 2021-04-27 RX ADMIN — CLOPIDOGREL BISULFATE 75 MILLIGRAM(S): 75 TABLET, FILM COATED ORAL at 12:36

## 2021-04-27 RX ADMIN — Medication 3: at 08:52

## 2021-04-27 RX ADMIN — Medication 2: at 12:37

## 2021-04-27 RX ADMIN — Medication 50 MICROGRAM(S): at 06:03

## 2021-04-27 RX ADMIN — Medication 0.5 MILLIGRAM(S): at 09:34

## 2021-04-27 RX ADMIN — Medication 1: at 17:35

## 2021-04-27 RX ADMIN — HYDROMORPHONE HYDROCHLORIDE 0.2 MILLIGRAM(S): 2 INJECTION INTRAMUSCULAR; INTRAVENOUS; SUBCUTANEOUS at 06:03

## 2021-04-27 RX ADMIN — Medication 81 MILLIGRAM(S): at 12:36

## 2021-04-27 RX ADMIN — ATORVASTATIN CALCIUM 80 MILLIGRAM(S): 80 TABLET, FILM COATED ORAL at 22:43

## 2021-04-27 RX ADMIN — Medication 10 UNIT(S): at 17:36

## 2021-04-27 RX ADMIN — Medication 3 MILLIGRAM(S): at 22:41

## 2021-04-27 RX ADMIN — SENNA PLUS 2 TABLET(S): 8.6 TABLET ORAL at 22:41

## 2021-04-27 RX ADMIN — BUMETANIDE 2 MILLIGRAM(S): 0.25 INJECTION INTRAMUSCULAR; INTRAVENOUS at 17:37

## 2021-04-27 RX ADMIN — ESCITALOPRAM OXALATE 5 MILLIGRAM(S): 10 TABLET, FILM COATED ORAL at 12:36

## 2021-04-27 RX ADMIN — PANTOPRAZOLE SODIUM 40 MILLIGRAM(S): 20 TABLET, DELAYED RELEASE ORAL at 06:03

## 2021-04-27 RX ADMIN — BUMETANIDE 2 MILLIGRAM(S): 0.25 INJECTION INTRAMUSCULAR; INTRAVENOUS at 06:03

## 2021-04-27 RX ADMIN — HYDROMORPHONE HYDROCHLORIDE 0.2 MILLIGRAM(S): 2 INJECTION INTRAMUSCULAR; INTRAVENOUS; SUBCUTANEOUS at 13:05

## 2021-04-27 RX ADMIN — Medication 10 UNIT(S): at 08:52

## 2021-04-27 NOTE — PROGRESS NOTE ADULT - PROBLEM SELECTOR PROBLEM 4
Systolic congestive heart failure, unspecified HF chronicity Acute on chronic systolic congestive heart failure

## 2021-04-27 NOTE — PROGRESS NOTE ADULT - PROBLEM SELECTOR PLAN 6
Most likely due to LV failure   -repeat TTE shows mild pulm HTN w/ normal RV size but decreased function. Most likely due to ischemic/valvular heart disease  -repeat TTE shows moderate pulm HTN w/ normal RV size but decreased function.

## 2021-04-27 NOTE — PROGRESS NOTE ADULT - ATTENDING COMMENTS
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   Heart failure medically managed.  Patient is stable for discharge with hospice care.  Family unable to manage at home.  Given a list of LTCF. 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   Heart failure medically managed. Diabetes with glucose monitoring, sugars elevated, adjust insulin accordingly.   Patient is stable for discharge with hospice care.  Family unable to manage at home.  Given a list of LTCF.

## 2021-04-27 NOTE — PROGRESS NOTE ADULT - SUBJECTIVE AND OBJECTIVE BOX
GAP TEAM PALLIATIVE CARE UNIT PROGRESS NOTE:      [  ] Patient on hospice program.    INDICATION FOR PALLIATIVE CARE UNIT SERVICES: Symptomatic management for advanced HF and dyspnea, transition to end of life care    INTERVAL HPI/OVERNIGHT EVENTS: Chart reviewed. The patient is seen and examined at the bedside. The patient required     DNR on chart: Yes      Allergies    azithromycin (Hives; Pruritus)    Intolerances    MEDICATIONS  (STANDING):  aspirin enteric coated 81 milliGRAM(s) Oral daily  atorvastatin 80 milliGRAM(s) Oral at bedtime  buMETAnide 2 milliGRAM(s) Oral two times a day  clopidogrel Tablet 75 milliGRAM(s) Oral daily  enoxaparin Injectable 30 milliGRAM(s) SubCutaneous daily  escitalopram 5 milliGRAM(s) Oral daily  hydrALAZINE 37.5 milliGRAM(s) Oral three times a day  HYDROmorphone  Injectable 0.2 milliGRAM(s) IV Push every 8 hours  insulin glargine Injectable (LANTUS) 12 Unit(s) SubCutaneous at bedtime  insulin lispro (ADMELOG) corrective regimen sliding scale   SubCutaneous three times a day before meals  insulin lispro (ADMELOG) corrective regimen sliding scale   SubCutaneous at bedtime  insulin lispro Injectable (ADMELOG) 10 Unit(s) SubCutaneous three times a day before meals  levothyroxine 50 MICROGram(s) Oral daily  melatonin 3 milliGRAM(s) Oral at bedtime  metoprolol succinate  milliGRAM(s) Oral daily  pantoprazole    Tablet 40 milliGRAM(s) Oral before breakfast  senna 2 Tablet(s) Oral at bedtime  sodium bicarbonate 650 milliGRAM(s) Oral daily  spironolactone 12.5 milliGRAM(s) Oral daily    MEDICATIONS  (PRN):  glycopyrrolate Injectable 0.4 milliGRAM(s) IV Push every 6 hours PRN Secretions.  HYDROmorphone  Injectable 0.2 milliGRAM(s) IV Push every 3 hours PRN Dyspnea, labored breathing or RR > 28  HYDROmorphone  Injectable 0.2 milliGRAM(s) IV Push every 3 hours PRN Moderate to Severe pain  LORazepam   Injectable 0.5 milliGRAM(s) IV Push every 1 hour PRN Anxiety or intractable respiratory distress.  polyethylene glycol 3350 17 Gram(s) Oral two times a day PRN Constipation    ITEMS UNCHECKED ARE NOT PRESENT    PRESENT SYMPTOMS: [ ]Unable to obtain due to poor mentation   Source if other than patient:  [ ]Family   [ ]Team     Pain: [ ] yes [ ] no  QOL impact -   Location -                    Aggravating factors -  Quality -  Radiation -  Timing-  Severity (0-10 scale):  Minimal acceptable level (0-10 scale):     Dyspnea:                           [ ]Mild [ ]Moderate [ ]Severe  Anxiety:                             [ ]Mild [ ]Moderate [ ]Severe  Fatigue:                             [ ]Mild [ ]Moderate [ ]Severe  Nausea:                             [ ]Mild [ ]Moderate [ ]Severe  Loss of appetite:              [ ]Mild [ ]Moderate [ ]Severe  Constipation:                    [ ]Mild [ ]Moderate [ ]Severe    PAINAD Score:    http://geriatrictoolkit.[facility]/cog/painad.pdf (Ctrl +  left click to view)  		  Other Symptoms:  [ ]All other review of systems negative     Palliative Performance Status Version 2:         %         http://npcrc.org/files/news/palliative_performance_scale_ppsv2.pdf  PHYSICAL EXAM:  Vital Signs Last 24 Hrs  T(C): 37.1 (27 Apr 2021 09:01), Max: 37.1 (27 Apr 2021 09:01)  T(F): 98.7 (27 Apr 2021 09:01), Max: 98.7 (27 Apr 2021 09:01)  HR: 81 (27 Apr 2021 09:01) (81 - 83)  BP: 98/60 (27 Apr 2021 13:09) (95/56 - 103/61)  BP(mean): --  RR: 20 (27 Apr 2021 09:01) (20 - 22)  SpO2: 93% (27 Apr 2021 09:01) (93% - 98%) I&O's Summary  GENERAL:  [ ]Alert  [ ]Oriented x   [ ]Lethargic  [ ]Cachexia  [ ]Unarousable  [ ]Verbal  [ ]Non-Verbal  Behavioral:   [ ] Anxiety  [ ] Delirium [ ] Agitation [ ] Other  HEENT:  [ ]Normal   [ ]Dry mouth   [ ]ET Tube/Trach  [ ]Oral lesions  PULMONARY:   [ ]Clear [ ]Tachypnea  [ ]Audible excessive secretions   [ ]Rhonchi        [ ]Right [ ]Left [ ]Bilateral  [ ]Crackles        [ ]Right [ ]Left [ ]Bilateral  [ ]Wheezing     [ ]Right [ ]Left [ ]Bilateral  [ ]Diminshed BS [ ]Right [ ]Left [ ]Bilateral    CARDIOVASCULAR:    [ ]Regular [ ]Irregular [ ]Tachy  [ ]Ramesh [ ]Murmur [ ]Other  GASTROINTESTINAL:  [ ]Soft  [ ]Distended   [ ]+BS  [ ]Non tender [ ]Tender  [ ]PEG [ ]OGT/ NGT   Last BM:       GENITOURINARY:  [ ]Normal [ ] Incontinent   [ ]Oliguria/Anuria   [ ]Ocsar  MUSCULOSKELETAL:   [ ]Normal   [ ]Weakness  [ ]Bed/Wheelchair bound [ ]Edema  NEUROLOGIC:   [ ]No focal deficits  [ ] Cognitive impairment  [ ] Dysphagia [ ]Dysarthria [ ] Paresis [ ]Other   SKIN:   [ ]Normal  [ ]Rash     [ ]Pressure ulcer(s)  [ ]y [ ]n  Present on admission      CRITICAL CARE:  [ ] Shock Present  [ ]Septic [ ]Cardiogenic [ ]Neurologic [ ]Hypovolemic  [ ]  Vasopressors [ ]  Inotropes   [ ] Respiratory failure present [ ] Mechanical Ventilation [ ] Non-invasive ventilatory support [ ] High-Flow  [ ] Acute  [ ] Chronic [ ] Hypoxic  [ ] Hypercarbic [ ] Other  [ ] Other organ failure     LABS:                        8.5    9.37  )-----------( 350      ( 26 Apr 2021 05:47 )             28.9   04-26    134<L>  |  92<L>  |  91<H>  ----------------------------<  210<H>  3.7   |  25  |  2.09<H>    Ca    8.6      26 Apr 2021 05:47  Phos  3.4     04-26  Mg     2.1     04-26          RADIOLOGY & ADDITIONAL STUDIES:    PROTEIN CALORIE MALNUTRITION: [ ] mild [ ] moderate [ ] severe  [ ] underweight [ ] morbid obesity    https://www.andeal.org/vault/2440/web/files/ONC/Table_Clinical%20Characteristics%20to%20Document%20Malnutrition-White%20JV%20et%20al%202012.pdf    Height (cm): 149.9 (04-16-21 @ 22:31), 149.9 (01-13-21 @ 01:15)  Weight (kg): 55 (04-26-21 @ 07:33), 53 (01-25-21 @ 13:11)  BMI (kg/m2): 24.5 (04-26-21 @ 07:33), 23.6 (04-16-21 @ 22:31), 23.6 (01-25-21 @ 13:11)    [ ] PPSV2 < or = 30% [ ] significant weight loss [ ] poor nutritional intake [ ] anasarca Albumin, Serum: 3.4 g/dL (04-19-21 @ 01:13)    Artificial Nutrition [ ]     REFERRALS:   [ ]Chaplaincy  [ ] Hospice  [ ]Child Life  [ ]Social Work  [ ]Case management [ ]Holistic Therapy [ ] Physical Therapy [ ] Dietary   Goals of Care Document:    GAP TEAM PALLIATIVE CARE UNIT PROGRESS NOTE:      [  ] Patient on hospice program.    INDICATION FOR PALLIATIVE CARE UNIT SERVICES: Symptomatic management for advanced HF and dyspnea, transition to end of life care    INTERVAL HPI/OVERNIGHT EVENTS: Chart reviewed. The patient is seen and examined at the bedside. The patient required PRN Ativan 0.5mg IV X2 and PRN Dilaudid 0.2mg IV X1 for dyspnea within the last 24hrs (8am-8am)    DNR on chart: Yes      Allergies    azithromycin (Hives; Pruritus)    Intolerances    MEDICATIONS  (STANDING):  aspirin enteric coated 81 milliGRAM(s) Oral daily  atorvastatin 80 milliGRAM(s) Oral at bedtime  buMETAnide 2 milliGRAM(s) Oral two times a day  clopidogrel Tablet 75 milliGRAM(s) Oral daily  enoxaparin Injectable 30 milliGRAM(s) SubCutaneous daily  escitalopram 5 milliGRAM(s) Oral daily  hydrALAZINE 37.5 milliGRAM(s) Oral three times a day  HYDROmorphone  Injectable 0.2 milliGRAM(s) IV Push every 8 hours  insulin glargine Injectable (LANTUS) 12 Unit(s) SubCutaneous at bedtime  insulin lispro (ADMELOG) corrective regimen sliding scale   SubCutaneous three times a day before meals  insulin lispro (ADMELOG) corrective regimen sliding scale   SubCutaneous at bedtime  insulin lispro Injectable (ADMELOG) 10 Unit(s) SubCutaneous three times a day before meals  levothyroxine 50 MICROGram(s) Oral daily  melatonin 3 milliGRAM(s) Oral at bedtime  metoprolol succinate  milliGRAM(s) Oral daily  pantoprazole    Tablet 40 milliGRAM(s) Oral before breakfast  senna 2 Tablet(s) Oral at bedtime  sodium bicarbonate 650 milliGRAM(s) Oral daily  spironolactone 12.5 milliGRAM(s) Oral daily    MEDICATIONS  (PRN):  glycopyrrolate Injectable 0.4 milliGRAM(s) IV Push every 6 hours PRN Secretions.  HYDROmorphone  Injectable 0.2 milliGRAM(s) IV Push every 3 hours PRN Dyspnea, labored breathing or RR > 28  HYDROmorphone  Injectable 0.2 milliGRAM(s) IV Push every 3 hours PRN Moderate to Severe pain  LORazepam   Injectable 0.5 milliGRAM(s) IV Push every 1 hour PRN Anxiety or intractable respiratory distress.  polyethylene glycol 3350 17 Gram(s) Oral two times a day PRN Constipation    ITEMS UNCHECKED ARE NOT PRESENT    PRESENT SYMPTOMS: [ ]Unable to obtain due to poor mentation   Source if other than patient:  [ ]Family   [ ]Team     Pain: [ ] yes [ X] no on assessment this morning  QOL impact -   Location -                    Aggravating factors -  Quality -  Radiation -  Timing-  Severity (0-10 scale):  Minimal acceptable level (0-10 scale):     Dyspnea:                           [ ]Mild [ ]Moderate [ ]Severe  Anxiety:                             [ ]Mild [ ]Moderate [ ]Severe  Fatigue:                             [ ]Mild [ ]Moderate [ ]Severe  Nausea:                             [ ]Mild [ ]Moderate [ ]Severe  Loss of appetite:              [ ]Mild [ ]Moderate [ ]Severe  Constipation:                    [ ]Mild [ ]Moderate [ ]Severe    PAINAD Score:    http://geriatrictoolkit.[facility]/cog/painad.pdf (Ctrl +  left click to view)  		  Other Symptoms:  [ X]All other review of systems negative     Palliative Performance Status Version 2:  30-40%         http://npcrc.org/files/news/palliative_performance_scale_ppsv2.pdf  PHYSICAL EXAM:  Vital Signs Last 24 Hrs  T(C): 37.1 (27 Apr 2021 09:01), Max: 37.1 (27 Apr 2021 09:01)  T(F): 98.7 (27 Apr 2021 09:01), Max: 98.7 (27 Apr 2021 09:01)  HR: 81 (27 Apr 2021 09:01) (81 - 83)  BP: 98/60 (27 Apr 2021 13:09) (95/56 - 103/61)  BP(mean): --  RR: 20 (27 Apr 2021 09:01) (20 - 22)  SpO2: 93% (27 Apr 2021 09:01) (93% - 98%) I&O's Summary  GENERAL:  [X ]Alert  [ ]Oriented x   [ X]Lethargic  [ ]Cachexia  [ ]Unarousable  [ X]Verbal  [ ]Non-Verbal  Behavioral:   [ ] Anxiety  [ ] Delirium [ ] Agitation [ ] Other  HEENT:  [ X]Normal   [ ]Dry mouth   [ ]ET Tube/Trach  [ ]Oral lesions  PULMONARY:   [ ]Clear [ ]Tachypnea  [ ]Audible excessive secretions   [ ]Rhonchi        [ ]Right [ ]Left [ ]Bilateral  [ ]Crackles        [ ]Right [ ]Left [ ]Bilateral  [ ]Wheezing     [ ]Right [ ]Left [ ]Bilateral  [ X]Diminshed BS [ ]Right [ ]Left [ X]Bilateral    CARDIOVASCULAR:    [ X]Regular [ ]Irregular [ ]Tachy  [ ]Ramesh [ ]Murmur [ ]Other  GASTROINTESTINAL:  [ X]Soft  [ ]Distended   [X ]+BS  [X ]Non tender [ ]Tender  [ ]PEG [ ]OGT/ NGT   Last BM:  4/26/21    GENITOURINARY:  [ ]Normal [ X] Incontinent   [ ]Oliguria/Anuria   [ ]Oscar [X] Other- primafit  MUSCULOSKELETAL:   [ ]Normal   [ X]Weakness  [X ]Bed/Wheelchair bound [ ]Edema  NEUROLOGIC:   [X ]No focal deficits  [ ] Cognitive impairment  [ ] Dysphagia [ ]Dysarthria [ ] Paresis [ ]Other   SKIN: IAD, ecchymosis  [ ]Normal  [ ]Rash     [ ]Pressure ulcer(s)  [ ]y [X ]n  Present on admission      CRITICAL CARE:  [ ] Shock Present  [ ]Septic [ ]Cardiogenic [ ]Neurologic [ ]Hypovolemic  [ ]  Vasopressors [ ]  Inotropes   [ ] Respiratory failure present [ ] Mechanical Ventilation [ ] Non-invasive ventilatory support [ ] High-Flow  [ ] Acute  [ ] Chronic [ ] Hypoxic  [ ] Hypercarbic [ ] Other  [X ] Other organ failure- Renal    LABS:                        8.5    9.37  )-----------( 350      ( 26 Apr 2021 05:47 )             28.9   04-26    134<L>  |  92<L>  |  91<H>  ----------------------------<  210<H>  3.7   |  25  |  2.09<H>    Ca    8.6      26 Apr 2021 05:47  Phos  3.4     04-26  Mg     2.1     04-26        RADIOLOGY & ADDITIONAL STUDIES: None new    PROTEIN CALORIE MALNUTRITION: [ ] mild [ ] moderate [ ] severe  [ ] underweight [ ] morbid obesity    https://www.andeal.org/vault/5330/web/files/ONC/Table_Clinical%20Characteristics%20to%20Document%20Malnutrition-White%20JV%20et%20al%202012.pdf    Height (cm): 149.9 (04-16-21 @ 22:31), 149.9 (01-13-21 @ 01:15)  Weight (kg): 55 (04-26-21 @ 07:33), 53 (01-25-21 @ 13:11)  BMI (kg/m2): 24.5 (04-26-21 @ 07:33), 23.6 (04-16-21 @ 22:31), 23.6 (01-25-21 @ 13:11)    [ ] PPSV2 < or = 30% [ ] significant weight loss [ ] poor nutritional intake [ ] anasarca Albumin, Serum: 3.4 g/dL (04-19-21 @ 01:13)    Artificial Nutrition [ ]     REFERRALS:   [X ]Chaplaincy  [ X] Hospice  [ ]Child Life  [ X]Social Work  [ ]Case management [ ]Holistic Therapy [ ] Physical Therapy [ ] Dietary   Goals of Care Document:    GAP TEAM PALLIATIVE CARE UNIT PROGRESS NOTE:      [  ] Patient on hospice program.    INDICATION FOR PALLIATIVE CARE UNIT SERVICES: Symptomatic management for advanced HF and dyspnea, transition to end of life care    INTERVAL HPI/OVERNIGHT EVENTS: Chart reviewed. The patient is seen and examined at the bedside. The patient required PRN Ativan 0.5mg IV X2 and PRN Dilaudid 0.2mg IV X1 for dyspnea within the last 24hrs (8am-8am)    DNR on chart: Yes      Allergies    azithromycin (Hives; Pruritus)    Intolerances    MEDICATIONS  (STANDING):  aspirin enteric coated 81 milliGRAM(s) Oral daily  atorvastatin 80 milliGRAM(s) Oral at bedtime  buMETAnide 2 milliGRAM(s) Oral two times a day  clopidogrel Tablet 75 milliGRAM(s) Oral daily  enoxaparin Injectable 30 milliGRAM(s) SubCutaneous daily  escitalopram 5 milliGRAM(s) Oral daily  hydrALAZINE 37.5 milliGRAM(s) Oral three times a day  HYDROmorphone  Injectable 0.2 milliGRAM(s) IV Push every 8 hours  insulin glargine Injectable (LANTUS) 12 Unit(s) SubCutaneous at bedtime  insulin lispro (ADMELOG) corrective regimen sliding scale   SubCutaneous three times a day before meals  insulin lispro (ADMELOG) corrective regimen sliding scale   SubCutaneous at bedtime  insulin lispro Injectable (ADMELOG) 10 Unit(s) SubCutaneous three times a day before meals  levothyroxine 50 MICROGram(s) Oral daily  melatonin 3 milliGRAM(s) Oral at bedtime  metoprolol succinate  milliGRAM(s) Oral daily  pantoprazole    Tablet 40 milliGRAM(s) Oral before breakfast  senna 2 Tablet(s) Oral at bedtime  sodium bicarbonate 650 milliGRAM(s) Oral daily  spironolactone 12.5 milliGRAM(s) Oral daily    MEDICATIONS  (PRN):  glycopyrrolate Injectable 0.4 milliGRAM(s) IV Push every 6 hours PRN Secretions.  HYDROmorphone  Injectable 0.2 milliGRAM(s) IV Push every 3 hours PRN Dyspnea, labored breathing or RR > 28  HYDROmorphone  Injectable 0.2 milliGRAM(s) IV Push every 3 hours PRN Moderate to Severe pain  LORazepam   Injectable 0.5 milliGRAM(s) IV Push every 1 hour PRN Anxiety or intractable respiratory distress.  polyethylene glycol 3350 17 Gram(s) Oral two times a day PRN Constipation    ITEMS UNCHECKED ARE NOT PRESENT    PRESENT SYMPTOMS: [ ]Unable to obtain due to poor mentation   Source if other than patient:  [ ]Family   [ ]Team     Pain: [ ] yes [ X] no on assessment this morning  QOL impact -   Location -                    Aggravating factors -  Quality -  Radiation -  Timing-  Severity (0-10 scale):  Minimal acceptable level (0-10 scale):     Dyspnea:                           [ ]Mild [ ]Moderate [ ]Severe  Anxiety:                             [ ]Mild [ ]Moderate [ ]Severe  Fatigue:                             [ ]Mild [ ]Moderate [ ]Severe  Nausea:                             [ ]Mild [ ]Moderate [ ]Severe  Loss of appetite:              [ ]Mild [ ]Moderate [ ]Severe  Constipation:                    [ ]Mild [ ]Moderate [ ]Severe    PAINAD Score:    [email or web address]/cog/painad.pdf (Ctrl +  left click to view)  		  Other Symptoms:  [ X]All other review of systems negative     Palliative Performance Status Version 2:  30-40%         http://npcrc.org/files/news/palliative_performance_scale_ppsv2.pdf  PHYSICAL EXAM:  Vital Signs Last 24 Hrs  T(C): 37.1 (27 Apr 2021 09:01), Max: 37.1 (27 Apr 2021 09:01)  T(F): 98.7 (27 Apr 2021 09:01), Max: 98.7 (27 Apr 2021 09:01)  HR: 81 (27 Apr 2021 09:01) (81 - 83)  BP: 98/60 (27 Apr 2021 13:09) (95/56 - 103/61)  BP(mean): --  RR: 20 (27 Apr 2021 09:01) (20 - 22)  SpO2: 93% (27 Apr 2021 09:01) (93% - 98%) I&O's Summary  GENERAL:  [X ]Alert  [ ]Oriented x   [ X]Lethargic  [ ]Cachexia  [ ]Unarousable  [ X]Verbal  [ ]Non-Verbal  Behavioral:   [ ] Anxiety  [ ] Delirium [ ] Agitation [ ] Other  HEENT:  [ X]Normal   [ ]Dry mouth   [ ]ET Tube/Trach  [ ]Oral lesions  PULMONARY:   [ ]Clear [ ]Tachypnea  [ ]Audible excessive secretions   [ ]Rhonchi        [ ]Right [ ]Left [ ]Bilateral  [ ]Crackles        [ ]Right [ ]Left [ ]Bilateral  [ ]Wheezing     [ ]Right [ ]Left [ ]Bilateral  [ X]Diminished BS [ ]Right [ ]Left [ X]Bilateral    CARDIOVASCULAR:    [ X]Regular [ ]Irregular [ ]Tachy  [ ]Ramesh [ ]Murmur [ ]Other  GASTROINTESTINAL:  [ X]Soft  [ ]Distended   [X ]+BS  [X ]Non tender [ ]Tender  [ ]PEG [ ]OGT/ NGT   Last BM:  4/26/21    GENITOURINARY:  [ ]Normal [ X] Incontinent   [ ]Oliguria/Anuria   [ ]Oscar [X] Other- primafit  MUSCULOSKELETAL:   [ ]Normal   [ X]Weakness  [X ]Bed/Wheelchair bound [ ]Edema  NEUROLOGIC:   [X ]No focal deficits  [ ] Cognitive impairment  [ ] Dysphagia [ ]Dysarthria [ ] Paresis [ ]Other   SKIN: IAD, ecchymosis  [ ]Normal  [ ]Rash     [ ]Pressure ulcer(s)  [ ]y [X ]n  Present on admission      CRITICAL CARE:  [ ] Shock Present  [ ]Septic [ ]Cardiogenic [ ]Neurologic [ ]Hypovolemic  [ ]  Vasopressors [ ]  Inotropes   [ ] Respiratory failure present [ ] Mechanical Ventilation [ ] Non-invasive ventilatory support [ ] High-Flow  [ ] Acute  [ ] Chronic [ ] Hypoxic  [ ] Hypercarbic [ ] Other  [X ] Other organ failure- Renal    LABS:                        8.5    9.37  )-----------( 350      ( 26 Apr 2021 05:47 )             28.9   04-26    134<L>  |  92<L>  |  91<H>  ----------------------------<  210<H>  3.7   |  25  |  2.09<H>    Ca    8.6      26 Apr 2021 05:47  Phos  3.4     04-26  Mg     2.1     04-26        RADIOLOGY & ADDITIONAL STUDIES: None new    e< from: TTE with Doppler (w/Cont) (04.19.21 @ 06:38) >    Patient name: SELVIN CLAIRE  YOB: 1945   Age: 75 (F)   MR#: [id number]  Study Date: 4/19/2021  Location: [address] [place], [place] [id number]: [facility]  Study quality: Technically difficult  Referring Physician: Erick Espinal MD  Blood Pressure: 118/62 mmHg  Height: 150 cm  Weight: 53 kg  BSA: 1.5 m2  ------------------------------------------------------------------------  PROCEDURE: Transthoracic echocardiogram with 2-D, M-Mode  and complete spectral and color flow Doppler. Verbal  consent was obtained for injection of  Ultrasonic Enhancing  Agent following a discussion of risks and benefits.  Following intravenous injection of Ultrasonic Enhancing  Agent , harmonic imaging was performed.  INDICATION: Cardiogenic shock (R57.0), Shortness of breath  (R06.02)  ------------------------------------------------------------------------  Dimensions:    Normal Values:  LA:     3.8    2.0 - 4.0 cm  Ao:     2.8    2.0 - 3.8 cm  SEPTUM: 0.9    0.6 - 1.2 cm  PWT:    0.9    0.6 - 1.1 cm  LVIDd:  4.5    3.0 - 5.6 cm  LVIDs:  4.0    1.8 - 4.0 cm  Derived variables:  LVMI: 90 g/m2  RWT: 0.40  Fractional short: 11 %  EF (Visual Estimate): 25-30 %  Doppler Peak Velocity (m/sec): AoV=1.2  ------------------------------------------------------------------------  Observations:  Mitral Valve: Mitral clip seen. Mild mitral regurgitation.  Mean transmitral valve gradient equals 5-6 mm Hg, which is  probably normal/milldy elevated  in the setting of a  Mitracip. HR 90s  Aortic Valve/Aorta: Calcified trileaflet aortic valve with  normal opening. Peak transaortic valve gradient equals 6 mm  Hg. Minimal aortic regurgitation.  Peak left ventricular  outflow tract gradient equals 1 mm Hg.  Aortic Root: 2.8 cm.  Left Atrium: Normal left atrium.  LAvolume index = 28  cc/m2.  Left Ventricle: Endocardial visualization enhanced with  intravenous injection of Ultrasonic Enhancing Agent  (Definity).  Severe global left ventricular systolic  dysfunction.  Mild left ventricular enlargement.  Indeterminate diastolic function.  Right Heart: Mild right atrial enlargement. Right  ventricular enlargement with decreased right ventricular  systolic function. Normal tricuspid valve. Moderate-severe  tricuspid regurgitation. Normal pulmonic valve. Mild  pulmonic regurgitation.  Pericardium/Pleura: Normal pericardium with no pericardial  effusion.  Hemodynamic: Estimated right atrial pressure is 8 mm Hg.  Estimated right ventricular systolic pressure equals 57 mm  Hg, assuming right atrial pressure equals 8 mm Hg,  consistent with moderate pulmonary hypertension.  ------------------------------------------------------------------------  Conclusions:  1. Mitral clip seen. Mild mitral regurgitation.  Mean  transmitral valve gradient equals 5-6 mm Hg, which is  probably normal/milldy elevated  in the setting of a  Mitracip. HR 90s  2. Minimal aortic regurgitation.  3.  Mild left ventricular enlargement.  4. Endocardial visualization enhanced with intravenous  injection of Ultrasonic Enhancing Agent(Definity).  Severe  global left ventricular systolic dysfunction.  5. Right ventricular enlargement with decreased right  ventricular systolic function.  6. Normal tricuspid valve. Moderate-severe tricuspid  regurgitation.  7. Estimated pulmonary artery systolic pressure equals 57  mm Hg, assuming right atrial pressure equals 8 mm Hg,  consistent with moderate pulmonary pressures.  ------------------------------------------------------------------------  Confirmed on  4/19/2021 - 20:00:44 by BASILIA Spear  ------------------------------------------------------------------------    < end of copied text >      PROTEIN CALORIE MALNUTRITION: [ ] mild [ ] moderate [ ] severe  [ ] underweight [ ] morbid obesity    https://www.andeal.org/vault/2440/web/files/ONC/Table_Clinical%20Characteristics%20to%20Document%20Malnutrition-White%20JV%20et%20al%202012.pdf    Height (cm): 149.9 (04-16-21 @ 22:31), 149.9 (01-13-21 @ 01:15)  Weight (kg): 55 (04-26-21 @ 07:33), 53 (01-25-21 @ 13:11)  BMI (kg/m2): 24.5 (04-26-21 @ 07:33), 23.6 (04-16-21 @ 22:31), 23.6 (01-25-21 @ 13:11)    [ ] PPSV2 < or = 30% [ ] significant weight loss [ ] poor nutritional intake [ ] anasarca Albumin, Serum: 3.4 g/dL (04-19-21 @ 01:13)    Artificial Nutrition [ ]     REFERRALS:   [X ]Chaplaincy  [ X] Hospice  [ ]Child Life  [ X]Social Work  [ ]Case management [ ]Holistic Therapy [ ] Physical Therapy [ ] Dietary   Goals of Care Document:

## 2021-04-27 NOTE — PROGRESS NOTE ADULT - PROBLEM SELECTOR PLAN 4
Recurrent ADHF exacerbations, admitted for ADHF now better controlled after diuresis, currently euvolemic but will continue to montior. TTE 4/19: EF 25-30%, mild MR with clip, severe global LV dysfunction, decreased RV dysfunction, mod-severe TR, mod pulm pressures.   - c/w bumex 2mg BID  - spironolactone 12.5mg daily  - c/w hydralazine, metoprolol  - strict I/O Recurrent ADHF exacerbations, admitted for ASHF now better controlled after diuresis, currently euvolemic but will continue to monitor. TTE 4/19: EF 25-30%, mild MR with clip, severe global LV dysfunction, decreased RV dysfunction, mod-severe TR, mod pulm pressures.   - c/w bumex 2mg BID  - spironolactone 12.5mg daily  - c/w hydralazine, metoprolol Recurrent ADHF exacerbations, admitted for ASHF now better controlled after diuresis, currently euvolemic but will continue to monitor. TTE 4/19: EF 25-30%, mild MR with clip, severe global LV dysfunction, decreased RV dysfunction, mod-severe TR, mod pulm pressures.   - c/w bumex 2mg BID  - spironolactone 12.5mg daily  - c/w hydralazine, metoprolol  Patient with borderline hypotension, continue to monitor.

## 2021-04-27 NOTE — HOSPICE CARE NOTE - CONVESATION DETAILS
Re-eval for possible Inpatient hospice.    Family has been made aware that if the Pt is not Inpt hospice appropriate, alternate d/c plan needs to be in place.  LTC being considered by family.    HCN RN will continue to follow.  Lisa Kauffman RN  [phone]

## 2021-04-27 NOTE — PROGRESS NOTE ADULT - PROBLEM SELECTOR PLAN 2
Required PRN Dilaudid 0.2mg IV X1 within the last 24hrs (8am-8am)  - c/w Dilaudid 0.2mg IVP q8hrs standing  - c/w Dilaudid 0.2mg IV q3hrs prn dyspnea  - c/w bowel regimen while patient is on opioids

## 2021-04-27 NOTE — PROGRESS NOTE ADULT - PROBLEM SELECTOR PLAN 1
The patient required PRN Ativan 0.5mg IV X2 within the last 24hrs (8am-8am)  - c/w IV Ativan 0.5mg q1hr PRN

## 2021-04-27 NOTE — PROGRESS NOTE ADULT - ASSESSMENT
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

## 2021-04-27 NOTE — PROGRESS NOTE ADULT - PROBLEM SELECTOR PLAN 8
Spoke with the patient's son in law Zaman Mohsin with the .   Questions answered.  Emotional support provided.  Patient stable for discharge. Discussed discharge planning today. Mohsin reports that the family can not take her home with hospice.  The patient at this time does not meet criteria for inpatient hospice. Plan is for NH with hospice care.  A list of facility to be provided to the family by the . Spoke with the patient's son in law Zaman Mohsin with the .   Questions answered.  Emotional support provided.  Patient stable for discharge. Discussed discharge planning today. Mohsin reports that the family can not take her home. The patient at this time does not meet criteria for inpatient hospice. Plan is for NH with hospice care.  A list of facility to be provided to the family by the .  -Will continue with symptom management in the PCU

## 2021-04-28 LAB
GLUCOSE BLDC GLUCOMTR-MCNC: 103 MG/DL — HIGH (ref 70–99)
GLUCOSE BLDC GLUCOMTR-MCNC: 117 MG/DL — HIGH (ref 70–99)
GLUCOSE BLDC GLUCOMTR-MCNC: 161 MG/DL — HIGH (ref 70–99)
GLUCOSE BLDC GLUCOMTR-MCNC: 275 MG/DL — HIGH (ref 70–99)

## 2021-04-28 PROCEDURE — 99232 SBSQ HOSP IP/OBS MODERATE 35: CPT

## 2021-04-28 RX ORDER — HYDRALAZINE HCL 50 MG
35 TABLET ORAL THREE TIMES A DAY
Refills: 0 | Status: DISCONTINUED | OUTPATIENT
Start: 2021-04-28 | End: 2021-05-03

## 2021-04-28 RX ORDER — HYDROMORPHONE HYDROCHLORIDE 2 MG/ML
0.2 INJECTION INTRAMUSCULAR; INTRAVENOUS; SUBCUTANEOUS EVERY 8 HOURS
Refills: 0 | Status: DISCONTINUED | OUTPATIENT
Start: 2021-04-28 | End: 2021-04-29

## 2021-04-28 RX ADMIN — CLOPIDOGREL BISULFATE 75 MILLIGRAM(S): 75 TABLET, FILM COATED ORAL at 12:55

## 2021-04-28 RX ADMIN — HYDROMORPHONE HYDROCHLORIDE 0.2 MILLIGRAM(S): 2 INJECTION INTRAMUSCULAR; INTRAVENOUS; SUBCUTANEOUS at 13:20

## 2021-04-28 RX ADMIN — HYDROMORPHONE HYDROCHLORIDE 0.2 MILLIGRAM(S): 2 INJECTION INTRAMUSCULAR; INTRAVENOUS; SUBCUTANEOUS at 05:31

## 2021-04-28 RX ADMIN — Medication 81 MILLIGRAM(S): at 12:55

## 2021-04-28 RX ADMIN — INSULIN GLARGINE 12 UNIT(S): 100 INJECTION, SOLUTION SUBCUTANEOUS at 21:47

## 2021-04-28 RX ADMIN — HYDROMORPHONE HYDROCHLORIDE 0.2 MILLIGRAM(S): 2 INJECTION INTRAMUSCULAR; INTRAVENOUS; SUBCUTANEOUS at 18:30

## 2021-04-28 RX ADMIN — Medication 0.5 MILLIGRAM(S): at 04:39

## 2021-04-28 RX ADMIN — Medication 650 MILLIGRAM(S): at 12:55

## 2021-04-28 RX ADMIN — BUMETANIDE 2 MILLIGRAM(S): 0.25 INJECTION INTRAMUSCULAR; INTRAVENOUS at 05:31

## 2021-04-28 RX ADMIN — Medication 35 MILLIGRAM(S): at 15:00

## 2021-04-28 RX ADMIN — Medication 1: at 12:55

## 2021-04-28 RX ADMIN — ENOXAPARIN SODIUM 30 MILLIGRAM(S): 100 INJECTION SUBCUTANEOUS at 13:20

## 2021-04-28 RX ADMIN — Medication 10 UNIT(S): at 18:16

## 2021-04-28 RX ADMIN — Medication 0.5 MILLIGRAM(S): at 18:16

## 2021-04-28 RX ADMIN — Medication 50 MICROGRAM(S): at 05:31

## 2021-04-28 RX ADMIN — ATORVASTATIN CALCIUM 80 MILLIGRAM(S): 80 TABLET, FILM COATED ORAL at 21:46

## 2021-04-28 RX ADMIN — HYDROMORPHONE HYDROCHLORIDE 0.2 MILLIGRAM(S): 2 INJECTION INTRAMUSCULAR; INTRAVENOUS; SUBCUTANEOUS at 21:46

## 2021-04-28 RX ADMIN — ESCITALOPRAM OXALATE 5 MILLIGRAM(S): 10 TABLET, FILM COATED ORAL at 12:54

## 2021-04-28 RX ADMIN — Medication 3: at 08:37

## 2021-04-28 RX ADMIN — Medication 10 UNIT(S): at 08:37

## 2021-04-28 RX ADMIN — BUMETANIDE 2 MILLIGRAM(S): 0.25 INJECTION INTRAMUSCULAR; INTRAVENOUS at 18:15

## 2021-04-28 RX ADMIN — Medication 10 UNIT(S): at 12:56

## 2021-04-28 RX ADMIN — PANTOPRAZOLE SODIUM 40 MILLIGRAM(S): 20 TABLET, DELAYED RELEASE ORAL at 06:04

## 2021-04-28 RX ADMIN — Medication 3 MILLIGRAM(S): at 21:46

## 2021-04-28 NOTE — HOSPICE CARE NOTE - CONVESATION DETAILS
As per TC w/PCU LEANDER Chamberlainrikki CodyBeltran - Pt's family is in agreement with [facility] for LTC.

## 2021-04-28 NOTE — PROGRESS NOTE ADULT - PROBLEM SELECTOR PLAN 8
Yesterday spoke with the patient's son in law Zaman Mohsin with the .   Questions answered.  Emotional support provided. Will reach out again today  Patient stable for discharge. Discussed discharge planning today. Mohsin reports that the family can not take her home. The patient at this time does not meet criteria for inpatient hospice. Plan is for NH with hospice care.  A list of facility to be provided to the family by the .  -Will continue with symptom management in the PCU Yesterday spoke with the patient's son in law Zaman Mohsin with the .   Questions answered.  Emotional support provided. Will reach out again today  Patient stable for discharge. Discussed discharge planning today. Mohsin reports that the family can not take her home. The patient at this time does not meet criteria for inpatient hospice. Plan is for NH with hospice care. Awaiting response from lester zhang.  -Will continue with symptom management in the PCU

## 2021-04-28 NOTE — PROGRESS NOTE ADULT - ASSESSMENT
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

## 2021-04-28 NOTE — PROGRESS NOTE ADULT - PROBLEM SELECTOR PLAN 1
The patient required PRN Ativan 0.5mg IV X2 within the last 24hrs (8am-8am)  - c/w IV Ativan 0.5mg q1hr PRN  - adding standing ativan 0.5mg PO q8hrs

## 2021-04-28 NOTE — PROGRESS NOTE ADULT - ATTENDING COMMENTS
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   Heart failure medically managed.  Patient is stable for discharge with hospice care.  Family unable to manage at home.  Awaiting response from Keyon Ferreira. 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   Heart failure medically managed.  Patient is stable for discharge with hospice care.  Family unable to manage at home.  Awaiting response from Keyon Ferreira.  Monitoring BP, fingersticks, may need to down adjust antihypertensives, adjust insulin.  Increased ativan due to episodes anxiety.

## 2021-04-28 NOTE — PROGRESS NOTE ADULT - SUBJECTIVE AND OBJECTIVE BOX
GAP TEAM PALLIATIVE CARE UNIT PROGRESS NOTE:      [  ] Patient on hospice program.    INDICATION FOR PALLIATIVE CARE UNIT SERVICES: Symptom management, transition to end of life care    INTERVAL HPI/OVERNIGHT EVENTS: Required 3 prn 0.5 IV ativan in past 24 hrs    DNR on chart: Yes      Allergies    azithromycin (Hives; Pruritus)    Intolerances    MEDICATIONS  (STANDING):  aspirin enteric coated 81 milliGRAM(s) Oral daily  atorvastatin 80 milliGRAM(s) Oral at bedtime  buMETAnide 2 milliGRAM(s) Oral two times a day  clopidogrel Tablet 75 milliGRAM(s) Oral daily  enoxaparin Injectable 30 milliGRAM(s) SubCutaneous daily  escitalopram 5 milliGRAM(s) Oral daily  hydrALAZINE 37.5 milliGRAM(s) Oral three times a day  HYDROmorphone  Injectable 0.2 milliGRAM(s) IV Push every 8 hours  insulin glargine Injectable (LANTUS) 12 Unit(s) SubCutaneous at bedtime  insulin lispro (ADMELOG) corrective regimen sliding scale   SubCutaneous three times a day before meals  insulin lispro (ADMELOG) corrective regimen sliding scale   SubCutaneous at bedtime  insulin lispro Injectable (ADMELOG) 10 Unit(s) SubCutaneous three times a day before meals  levothyroxine 50 MICROGram(s) Oral daily  melatonin 3 milliGRAM(s) Oral at bedtime  metoprolol succinate  milliGRAM(s) Oral daily  pantoprazole    Tablet 40 milliGRAM(s) Oral before breakfast  senna 2 Tablet(s) Oral at bedtime  sodium bicarbonate 650 milliGRAM(s) Oral daily  spironolactone 12.5 milliGRAM(s) Oral daily    MEDICATIONS  (PRN):  glycopyrrolate Injectable 0.4 milliGRAM(s) IV Push every 6 hours PRN Secretions.  HYDROmorphone  Injectable 0.2 milliGRAM(s) IV Push every 3 hours PRN Dyspnea, labored breathing or RR > 28  HYDROmorphone  Injectable 0.2 milliGRAM(s) IV Push every 3 hours PRN Moderate to Severe pain  LORazepam   Injectable 0.5 milliGRAM(s) IV Push every 1 hour PRN Anxiety or intractable respiratory distress.  polyethylene glycol 3350 17 Gram(s) Oral two times a day PRN Constipation    ITEMS UNCHECKED ARE NOT PRESENT    PRESENT SYMPTOMS: [ ]Unable to obtain due to poor mentation   Source if other than patient:  [ ]Family   [ ]Team     Pain: [ ] yes [x ] no  QOL impact -   Location -                    Aggravating factors -  Quality -  Radiation -  Timing-  Severity (0-10 scale):  Minimal acceptable level (0-10 scale):     Dyspnea:                           [ ]Mild [ ]Moderate [ ]Severe  Anxiety:                             [x ]Mild [ ]Moderate [ ]Severe  Fatigue:                             [x ]Mild [ ]Moderate [ ]Severe  Nausea:                             [ ]Mild [ ]Moderate [ ]Severe  Loss of appetite:              [ ]Mild [ ]Moderate [ ]Severe  Constipation:                    [ ]Mild [ ]Moderate [ ]Severe    PAINAD Score:    [email or web address] (Ctrl +  left click to view)  		  Other Symptoms:  [ x]All other review of systems negative     Palliative Performance Status Version 2:         %         http://npcrc.org/files/news/palliative_performance_scale_[email or web address].pdf  PHYSICAL EXAM:  Vital Signs Last 24 Hrs  T(C): 36.5 (28 Apr 2021 08:25), Max: 36.5 (28 Apr 2021 08:25)  T(F): 97.7 (28 Apr 2021 08:25), Max: 97.7 (28 Apr 2021 08:25)  HR: 82 (28 Apr 2021 08:25) (82 - 82)  BP: 92/54 (28 Apr 2021 08:25) (92/54 - 98/60)  BP(mean): --  RR: 20 (28 Apr 2021 08:25) (20 - 20)  SpO2: 97% (28 Apr 2021 08:25) (97% - 97%) I&O's Summary    27 Apr 2021 07:01  -  28 Apr 2021 07:00  --------------------------------------------------------  IN: 0 mL / OUT: 900 mL / NET: -900 mL    GENERAL:  [ ]Alert  [ ]Oriented x   [x ]Lethargic  [ ]Cachexia  [ ]Unarousable  [ ]Verbal  [ ]Non-Verbal  Behavioral:   [x ] Anxiety  [ ] Delirium [ ] Agitation [ ] Other  HEENT:  [x ]Normal   [ ]Dry mouth   [ ]ET Tube/Trach  [ ]Oral lesions  PULMONARY:   [ ]Clear [ ]Tachypnea  [ ]Audible excessive secretions   [ ]Rhonchi        [ ]Right [ ]Left [ ]Bilateral  [ ]Crackles        [ ]Right [ ]Left [ ]Bilateral  [ ]Wheezing     [ ]Right [ ]Left [ ]Bilateral  [ ]Diminshed BS [ ]Right [ ]Left [ ]Bilateral    CARDIOVASCULAR:    [ x]Regular [ ]Irregular [ ]Tachy  [ ]Ramesh [ ]Murmur [ ]Other  GASTROINTESTINAL:  [ x]Soft  [ ]Distended   [x ]+BS  [x ]Non tender [ ]Tender  [ ]PEG [ ]OGT/ NGT   Last BM:   Today    GENITOURINARY:  [ ]Normal [ ] Incontinent   [ ]Oliguria/Anuria   [x ]Oscar  MUSCULOSKELETAL:   [ ]Normal   [ x]Weakness  [ ]Bed/Wheelchair bound [ ]Edema  NEUROLOGIC:   [x ]No focal deficits  [ ] Cognitive impairment  [ ] Dysphagia [ ]Dysarthria [ ] Paresis [ ]Other   SKIN:   [x ]Normal  [ ]Rash     [ ]Pressure ulcer(s)  [ ]y [x ]n  Present on admission      CRITICAL CARE:  [ ] Shock Present  [ ]Septic [ ]Cardiogenic [ ]Neurologic [ ]Hypovolemic  [ ]  Vasopressors [ ]  Inotropes   [ ] Respiratory failure present [ ] Mechanical Ventilation [ ] Non-invasive ventilatory support [ ] High-Flow  [ ] Acute  [ ] Chronic [ ] Hypoxic  [ ] Hypercarbic [ ] Other  [ ] Other organ failure     LABS:    FSG range in 200's        RADIOLOGY & ADDITIONAL STUDIES:    PROTEIN CALORIE MALNUTRITION: [ ] mild [ ] moderate [ ] severe  [ ] underweight [ ] morbid obesity    https://www.andeal.org/vault/2440/web/files/ONC/Table_Clinical%20Characteristics%20to%20Document%20Malnutrition-White%20JV%20et%20al%202012.pdf    Height (cm): 149.9 (04-16-21 @ 22:31), 149.9 (01-13-21 @ 01:15)  Weight (kg): 55 (04-26-21 @ 07:33), 53 (01-25-21 @ 13:11)  BMI (kg/m2): 24.5 (04-26-21 @ 07:33), 23.6 (04-16-21 @ 22:31), 23.6 (01-25-21 @ 13:11)    [ ] PPSV2 < or = 30% [ ] significant weight loss [ ] poor nutritional intake [ ] anasarca Albumin, Serum: 3.4 g/dL (04-19-21 @ 01:13)    Artificial Nutrition [ ]     REFERRALS:   [ ]Chaplaincy  [x ] Hospice  [ ]Child Life  [x ]Social Work  [ ]Case management [ ]Holistic Therapy [ ] Physical Therapy [ ] Dietary   Goals of Care Document:    GAP TEAM PALLIATIVE CARE UNIT PROGRESS NOTE:      [  ] Patient on hospice program.    INDICATION FOR PALLIATIVE CARE UNIT SERVICES: Symptom management, transition to end of life care in the setting of heart failure    INTERVAL HPI/OVERNIGHT EVENTS: Required 3 prn 0.5 IV ativan in past 24 hrs    DNR on chart: Yes      Allergies    azithromycin (Hives; Pruritus)    Intolerances    MEDICATIONS  (STANDING):  aspirin enteric coated 81 milliGRAM(s) Oral daily  atorvastatin 80 milliGRAM(s) Oral at bedtime  buMETAnide 2 milliGRAM(s) Oral two times a day  clopidogrel Tablet 75 milliGRAM(s) Oral daily  enoxaparin Injectable 30 milliGRAM(s) SubCutaneous daily  escitalopram 5 milliGRAM(s) Oral daily  hydrALAZINE 37.5 milliGRAM(s) Oral three times a day  HYDROmorphone  Injectable 0.2 milliGRAM(s) IV Push every 8 hours  insulin glargine Injectable (LANTUS) 12 Unit(s) SubCutaneous at bedtime  insulin lispro (ADMELOG) corrective regimen sliding scale   SubCutaneous three times a day before meals  insulin lispro (ADMELOG) corrective regimen sliding scale   SubCutaneous at bedtime  insulin lispro Injectable (ADMELOG) 10 Unit(s) SubCutaneous three times a day before meals  levothyroxine 50 MICROGram(s) Oral daily  melatonin 3 milliGRAM(s) Oral at bedtime  metoprolol succinate  milliGRAM(s) Oral daily  pantoprazole    Tablet 40 milliGRAM(s) Oral before breakfast  senna 2 Tablet(s) Oral at bedtime  sodium bicarbonate 650 milliGRAM(s) Oral daily  spironolactone 12.5 milliGRAM(s) Oral daily    MEDICATIONS  (PRN):  glycopyrrolate Injectable 0.4 milliGRAM(s) IV Push every 6 hours PRN Secretions.  HYDROmorphone  Injectable 0.2 milliGRAM(s) IV Push every 3 hours PRN Dyspnea, labored breathing or RR > 28  HYDROmorphone  Injectable 0.2 milliGRAM(s) IV Push every 3 hours PRN Moderate to Severe pain  LORazepam   Injectable 0.5 milliGRAM(s) IV Push every 1 hour PRN Anxiety or intractable respiratory distress.  polyethylene glycol 3350 17 Gram(s) Oral two times a day PRN Constipation    ITEMS UNCHECKED ARE NOT PRESENT    PRESENT SYMPTOMS: [ ]Unable to obtain due to poor mentation   Source if other than patient:  [ ]Family   [ ]Team     Pain: [ ] yes [x ] no  QOL impact -   Location -                    Aggravating factors -  Quality -  Radiation -  Timing-  Severity (0-10 scale):  Minimal acceptable level (0-10 scale):     Dyspnea:                           [ ]Mild [ ]Moderate [ ]Severe  Anxiety:                             [x ]Mild [ ]Moderate [ ]Severe  Fatigue:                             [x ]Mild [ ]Moderate [ ]Severe  Nausea:                             [ ]Mild [ ]Moderate [ ]Severe  Loss of appetite:              [ ]Mild [ ]Moderate [ ]Severe  Constipation:                    [ ]Mild [ ]Moderate [ ]Severe    PAINAD Score:    [email or web address] (Ctrl +  left click to view)  		  Other Symptoms:  [ x]All other review of systems negative     Palliative Performance Status Version 2:  30       %         http://[email or web address]/files/news/palliative_performance_scale_[email or web address].pdf  PHYSICAL EXAM:  Vital Signs Last 24 Hrs  T(C): 36.5 (28 Apr 2021 08:25), Max: 36.5 (28 Apr 2021 08:25)  T(F): 97.7 (28 Apr 2021 08:25), Max: 97.7 (28 Apr 2021 08:25)  HR: 82 (28 Apr 2021 08:25) (82 - 82)  BP: 92/54 (28 Apr 2021 08:25) (92/54 - 98/60)  BP(mean): --  RR: 20 (28 Apr 2021 08:25) (20 - 20)  SpO2: 97% (28 Apr 2021 08:25) (97% - 97%) I&O's Summary    27 Apr 2021 07:01  -  28 Apr 2021 07:00  --------------------------------------------------------  IN: 0 mL / OUT: 900 mL / NET: -900 mL    GENERAL:  [ ]Alert  [ ]Oriented x   [x ]Lethargic  [ ]Cachexia  [ ]Unarousable  [ ]Verbal  [x ]Non-Verbal  Behavioral:   [x ] Anxiety  [ ] Delirium [ ] Agitation [ ] Other  HEENT:  [x ]Normal   [ ]Dry mouth   [ ]ET Tube/Trach  [ ]Oral lesions  PULMONARY:   [x ]Clear [ ]Tachypnea  [ ]Audible excessive secretions   [ ]Rhonchi        [ ]Right [ ]Left [ ]Bilateral  [ ]Crackles        [ ]Right [ ]Left [ ]Bilateral  [ ]Wheezing     [ ]Right [ ]Left [ ]Bilateral  [ x]Diminished BS [ ]Right [ ]Left [ ]Bilateral    CARDIOVASCULAR:    [ x]Regular [ ]Irregular [ ]Tachy  [ ]Ramesh [ ]Murmur [ ]Other  GASTROINTESTINAL:  [ x]Soft  [ ]Distended   [x ]+BS  [x ]Non tender [ ]Tender  [ ]PEG [ ]OGT/ NGT   Last BM:   Today    GENITOURINARY:  [ ]Normal [x ] Incontinent   [ ]Oliguria/Anuria   [x ]Oscar  MUSCULOSKELETAL:   [ ]Normal   [ x]Weakness  [ ]Bed/Wheelchair bound [ ]Edema  NEUROLOGIC:   [x ]No focal deficits  [ ] Cognitive impairment  [ ] Dysphagia [ ]Dysarthria [ ] Paresis [ ]Other   SKIN:   [x ]Normal  [ ]Rash     [ ]Pressure ulcer(s)  [ ]y [x ]n  Present on admission      CRITICAL CARE:  [ ] Shock Present  [ ]Septic [ ]Cardiogenic [ ]Neurologic [ ]Hypovolemic  [ ]  Vasopressors [ ]  Inotropes   [ ] Respiratory failure present [ ] Mechanical Ventilation [ ] Non-invasive ventilatory support [ ] High-Flow  [ ] Acute  [ ] Chronic [ ] Hypoxic  [ ] Hypercarbic [ ] Other  [ ] Other organ failure     LABS:    FSG range in 200's        RADIOLOGY & ADDITIONAL STUDIES:  none new    PROTEIN CALORIE MALNUTRITION: [ ] mild [ ] moderate [ ] severe  [ ] underweight [ ] morbid obesity    https://www.andeal.org/vault/2440/web/files/ONC/Table_Clinical%20Characteristics%20to%20Document%20Malnutrition-White%20JV%20et%20al%202012.pdf    Height (cm): 149.9 (04-16-21 @ 22:31), 149.9 (01-13-21 @ 01:15)  Weight (kg): 55 (04-26-21 @ 07:33), 53 (01-25-21 @ 13:11)  BMI (kg/m2): 24.5 (04-26-21 @ 07:33), 23.6 (04-16-21 @ 22:31), 23.6 (01-25-21 @ 13:11)    [ x] PPSV2 < or = 30% [ ] significant weight loss [ ] poor nutritional intake [ ] anasarca Albumin, Serum: 3.4 g/dL (04-19-21 @ 01:13)    Artificial Nutrition [ ]     REFERRALS:   [ ]Chaplaincy  [x ] Hospice  [ ]Child Life  [x ]Social Work  [ ]Case management [ ]Holistic Therapy [ ] Physical Therapy [ ] Dietary   Goals of Care Document:

## 2021-04-28 NOTE — PROGRESS NOTE ADULT - PROBLEM SELECTOR PLAN 4
Recurrent ADHF exacerbations, admitted for ASHF now better controlled after diuresis, currently euvolemic but will continue to monitor. TTE 4/19: EF 25-30%, mild MR with clip, severe global LV dysfunction, decreased RV dysfunction, mod-severe TR, mod pulm pressures.   - c/w bumex 2mg BID  - spironolactone 12.5mg daily  - c/w hydralazine, metoprolol Recurrent ASHF exacerbations, admitted for ASHF now better controlled after diuresis, currently euvolemic but will continue to monitor. TTE 4/19: EF 25-30%, mild MR with clip, severe global LV dysfunction, decreased RV dysfunction, mod-severe TR, mod pulm pressures.   - c/w bumex 2mg BID  - spironolactone 12.5mg daily  - c/w hydralazine, metoprolol Recurrent ASHF exacerbations, admitted for ASHF now better controlled after diuresis, currently euvolemic but will continue to monitor. TTE 4/19: EF 25-30%, mild MR with clip, severe global LV dysfunction, decreased RV dysfunction, mod-severe TR, mod pulm pressures.   - c/w bumex 2mg BID  - spironolactone 12.5mg daily  - c/w hydralazine, metoprolol  Patient continues with borderline hypotension - monitoring

## 2021-04-28 NOTE — PROGRESS NOTE ADULT - PROBLEM SELECTOR PLAN 6
Most likely due to ischemic/valvular heart disease  -repeat TTE shows moderate pulm HTN w/ normal RV size but decreased function.

## 2021-04-29 LAB
GLUCOSE BLDC GLUCOMTR-MCNC: 149 MG/DL — HIGH (ref 70–99)
GLUCOSE BLDC GLUCOMTR-MCNC: 258 MG/DL — HIGH (ref 70–99)
GLUCOSE BLDC GLUCOMTR-MCNC: 321 MG/DL — HIGH (ref 70–99)

## 2021-04-29 PROCEDURE — 99232 SBSQ HOSP IP/OBS MODERATE 35: CPT

## 2021-04-29 RX ORDER — LACTULOSE 10 G/15ML
10 SOLUTION ORAL DAILY
Refills: 0 | Status: DISCONTINUED | OUTPATIENT
Start: 2021-04-29 | End: 2021-05-04

## 2021-04-29 RX ORDER — HYDROMORPHONE HYDROCHLORIDE 2 MG/ML
0.2 INJECTION INTRAMUSCULAR; INTRAVENOUS; SUBCUTANEOUS EVERY 6 HOURS
Refills: 0 | Status: DISCONTINUED | OUTPATIENT
Start: 2021-04-29 | End: 2021-05-02

## 2021-04-29 RX ORDER — BUMETANIDE 0.25 MG/ML
2 INJECTION INTRAMUSCULAR; INTRAVENOUS
Refills: 0 | Status: DISCONTINUED | OUTPATIENT
Start: 2021-04-29 | End: 2021-05-04

## 2021-04-29 RX ORDER — HYDROMORPHONE HYDROCHLORIDE 2 MG/ML
0.5 INJECTION INTRAMUSCULAR; INTRAVENOUS; SUBCUTANEOUS
Refills: 0 | Status: DISCONTINUED | OUTPATIENT
Start: 2021-04-29 | End: 2021-05-04

## 2021-04-29 RX ADMIN — HYDROMORPHONE HYDROCHLORIDE 0.2 MILLIGRAM(S): 2 INJECTION INTRAMUSCULAR; INTRAVENOUS; SUBCUTANEOUS at 05:40

## 2021-04-29 RX ADMIN — Medication 100 MILLIGRAM(S): at 05:43

## 2021-04-29 RX ADMIN — Medication 3: at 09:14

## 2021-04-29 RX ADMIN — HYDROMORPHONE HYDROCHLORIDE 0.2 MILLIGRAM(S): 2 INJECTION INTRAMUSCULAR; INTRAVENOUS; SUBCUTANEOUS at 17:25

## 2021-04-29 RX ADMIN — PANTOPRAZOLE SODIUM 40 MILLIGRAM(S): 20 TABLET, DELAYED RELEASE ORAL at 06:03

## 2021-04-29 RX ADMIN — Medication 10 UNIT(S): at 09:15

## 2021-04-29 RX ADMIN — ENOXAPARIN SODIUM 30 MILLIGRAM(S): 100 INJECTION SUBCUTANEOUS at 12:57

## 2021-04-29 RX ADMIN — Medication 0.5 MILLIGRAM(S): at 17:24

## 2021-04-29 RX ADMIN — HYDROMORPHONE HYDROCHLORIDE 0.2 MILLIGRAM(S): 2 INJECTION INTRAMUSCULAR; INTRAVENOUS; SUBCUTANEOUS at 09:06

## 2021-04-29 RX ADMIN — SPIRONOLACTONE 12.5 MILLIGRAM(S): 25 TABLET, FILM COATED ORAL at 05:42

## 2021-04-29 RX ADMIN — INSULIN GLARGINE 12 UNIT(S): 100 INJECTION, SOLUTION SUBCUTANEOUS at 22:28

## 2021-04-29 RX ADMIN — HYDROMORPHONE HYDROCHLORIDE 0.2 MILLIGRAM(S): 2 INJECTION INTRAMUSCULAR; INTRAVENOUS; SUBCUTANEOUS at 13:08

## 2021-04-29 RX ADMIN — Medication 50 MICROGRAM(S): at 05:43

## 2021-04-29 RX ADMIN — Medication 81 MILLIGRAM(S): at 12:58

## 2021-04-29 RX ADMIN — HYDROMORPHONE HYDROCHLORIDE 0.2 MILLIGRAM(S): 2 INJECTION INTRAMUSCULAR; INTRAVENOUS; SUBCUTANEOUS at 09:20

## 2021-04-29 RX ADMIN — BUMETANIDE 2 MILLIGRAM(S): 0.25 INJECTION INTRAMUSCULAR; INTRAVENOUS at 05:41

## 2021-04-29 RX ADMIN — Medication 10 UNIT(S): at 17:24

## 2021-04-29 RX ADMIN — Medication 10 UNIT(S): at 12:48

## 2021-04-29 RX ADMIN — Medication 0.5 MILLIGRAM(S): at 05:42

## 2021-04-29 RX ADMIN — Medication 35 MILLIGRAM(S): at 05:42

## 2021-04-29 NOTE — PROGRESS NOTE ADULT - SUBJECTIVE AND OBJECTIVE BOX
GAP TEAM PALLIATIVE CARE UNIT PROGRESS NOTE:      [  ] Patient on hospice program.    INDICATION FOR PALLIATIVE CARE UNIT SERVICES: Symptom management of dyspnea and transition to end of life care in setting of end stage HF    INTERVAL HPI/OVERNIGHT EVENTS: Overnight pt required one dose of dilaudid 0.2 IV for dyspnea and one for pain.     Pt continues to refuse  services and preferred to speak in english    DNR on chart: Yes      Allergies    azithromycin (Hives; Pruritus)    Intolerances    MEDICATIONS  (STANDING):  aspirin enteric coated 81 milliGRAM(s) Oral daily  buMETAnide 2 milliGRAM(s) Oral two times a day  clopidogrel Tablet 75 milliGRAM(s) Oral daily  enoxaparin Injectable 30 milliGRAM(s) SubCutaneous daily  escitalopram 5 milliGRAM(s) Oral daily  hydrALAZINE 35 milliGRAM(s) Oral three times a day  HYDROmorphone  Injectable 0.2 milliGRAM(s) IV Push every 8 hours  insulin glargine Injectable (LANTUS) 12 Unit(s) SubCutaneous at bedtime  insulin lispro Injectable (ADMELOG) 10 Unit(s) SubCutaneous three times a day before meals  levothyroxine 50 MICROGram(s) Oral daily  LORazepam     Tablet 0.5 milliGRAM(s) Oral every 12 hours  melatonin 3 milliGRAM(s) Oral at bedtime  metoprolol succinate  milliGRAM(s) Oral daily  pantoprazole    Tablet 40 milliGRAM(s) Oral before breakfast  senna 2 Tablet(s) Oral at bedtime  sodium bicarbonate 650 milliGRAM(s) Oral daily  spironolactone 12.5 milliGRAM(s) Oral daily    MEDICATIONS  (PRN):  glycopyrrolate Injectable 0.4 milliGRAM(s) IV Push every 6 hours PRN Secretions.  HYDROmorphone  Injectable 0.2 milliGRAM(s) IV Push every 3 hours PRN Moderate to Severe pain  HYDROmorphone  Injectable 0.2 milliGRAM(s) IV Push every 3 hours PRN Dyspnea, labored breathing or RR > 28  LORazepam   Injectable 0.5 milliGRAM(s) IV Push every 1 hour PRN Anxiety  polyethylene glycol 3350 17 Gram(s) Oral two times a day PRN Constipation    ITEMS UNCHECKED ARE NOT PRESENT    PRESENT SYMPTOMS: [ ]Unable to obtain due to poor mentation   Source if other than patient:  [ ]Family   [ ]Team     Pain: [x ] yes [ ] no  QOL impact -   Location - generalized abdominal              Aggravating factors - none  Quality - pt unable to elucidate  Radiation - no  Timing-  Severity (0-10 scale): 3  Minimal acceptable level (0-10 scale):     Dyspnea:                           [ ]Mild [ ]Moderate [ ]Severe  Anxiety:                             [x ]Mild [ ]Moderate [ ]Severe  Fatigue:                             [ x]Mild [ ]Moderate [ ]Severe  Nausea:                             [ ]Mild [ ]Moderate [ ]Severe  Loss of appetite:              [ ]Mild [ ]Moderate [ ]Severe  Constipation:                    [ ]Mild [ ]Moderate [ ]Severe    PAINAD Score: 1    [email or web address] (Ctrl +  left click to view)  		  Other Symptoms:  [ ]All other review of systems negative     Palliative Performance Status Version 2:       40  %         http://npcrc.org/files/news/palliative_performance_scale_ppsv2.pdf  PHYSICAL EXAM:  Vital Signs Last 24 Hrs  T(C): 36.7 (29 Apr 2021 09:17), Max: 36.7 (29 Apr 2021 09:17)  T(F): 98 (29 Apr 2021 09:17), Max: 98 (29 Apr 2021 09:17)  HR: 77 (29 Apr 2021 09:17) (77 - 94)  BP: 89/60 (29 Apr 2021 09:17) (89/60 - 106/66)  BP(mean): --  RR: 18 (29 Apr 2021 09:17) (18 - 18)  SpO2: 99% (29 Apr 2021 09:17) (99% - 99%) I&O's Summary    28 Apr 2021 07:01  -  29 Apr 2021 07:00  --------------------------------------------------------  IN: 0 mL / OUT: 600 mL / NET: -600 mL    GENERAL:  [ ]Alert  [ ]Oriented    [ x]Lethargic  [ ]Cachexia  [ ]Unarousable  [x ]Verbal  [ ]Non-Verbal  Behavioral:   [ x] Anxiety  [ ] Delirium [ ] Agitation [ ] Other  HEENT:  [x ]Normal   [ ]Dry mouth   [ ]ET Tube/Trach  [ ]Oral lesions  PULMONARY:   [x ]Clear [ ]Tachypnea  [ ]Audible excessive secretions   [ ]Rhonchi        [ ]Right [ ]Left [ ]Bilateral  [ ]Crackles        [ ]Right [ ]Left [ ]Bilateral  [ ]Wheezing     [ ]Right [ ]Left [ ]Bilateral  [ ]Diminshed BS [ ]Right [ ]Left [ ]Bilateral    CARDIOVASCULAR:    [x ]Regular [ ]Irregular [ ]Tachy  [ ]Ramesh [ ]Murmur [ ]Other  GASTROINTESTINAL:  [x ]Soft  [ ]Distended   [x ]+BS  [ ]Non tender [ x]Tender  [ ]PEG [ ]OGT/ NGT   Last BM:   2 BM yesterday (4/28)    GENITOURINARY:  [ ]Normal [ ] Incontinent   [ ]Oliguria/Anuria   [ ]Oscar  MUSCULOSKELETAL:   [ ]Normal   [ x]Weakness  [ ]Bed/Wheelchair bound [ ]Edema  NEUROLOGIC:   [ x]No focal deficits  [ ] Cognitive impairment  [ ] Dysphagia [ ]Dysarthria [ ] Paresis [ ]Other   SKIN:   [x ]Normal  [ ]Rash     [ ]Pressure ulcer(s)  [ ]y [ ]n  Present on admission      CRITICAL CARE:  [ ] Shock Present  [ ]Septic [ ]Cardiogenic [ ]Neurologic [ ]Hypovolemic  [ ]  Vasopressors [ ]  Inotropes   [ ] Respiratory failure present [ ] Mechanical Ventilation [ ] Non-invasive ventilatory support [ ] High-Flow  [ ] Acute  [ ] Chronic [ ] Hypoxic  [ ] Hypercarbic [ ] Other  [ ] Other organ failure     LABS:            RADIOLOGY & ADDITIONAL STUDIES:    PROTEIN CALORIE MALNUTRITION: [ ] mild [ ] moderate [ ] severe  [ ] underweight [ ] morbid obesity    https://www.andeal.org/vault/6260/web/files/ONC/Table_Clinical%20Characteristics%20to%20Document%20Malnutrition-White%20JV%20et%20al%202012.pdf    Height (cm): 149.9 (04-16-21 @ 22:31), 149.9 (01-13-21 @ 01:15)  Weight (kg): 55 (04-26-21 @ 07:33), 53 (01-25-21 @ 13:11)  BMI (kg/m2): 24.5 (04-26-21 @ 07:33), 23.6 (04-16-21 @ 22:31), 23.6 (01-25-21 @ 13:11)    [ ] PPSV2 < or = 30% [ ] significant weight loss [ ] poor nutritional intake [ ] anasarca Albumin, Serum: 3.4 g/dL (04-19-21 @ 01:13)    Artificial Nutrition [ ]     REFERRALS:   [ ]Chaplaincy  [ ] Hospice  [ ]Child Life  [ ]Social Work  [ ]Case management [ ]Holistic Therapy [ ] Physical Therapy [ ] Dietary   Goals of Care Document:    GAP TEAM PALLIATIVE CARE UNIT PROGRESS NOTE:      [  ] Patient on hospice program.    INDICATION FOR PALLIATIVE CARE UNIT SERVICES: Symptom management of dyspnea and transition to end of life care in setting of end stage HF    INTERVAL HPI/OVERNIGHT EVENTS: Overnight pt required one dose of dilaudid 0.2 IV for dyspnea and one for pain.     Pt continues to refuse  services and preferred to speak in english    DNR on chart: Yes      Allergies    azithromycin (Hives; Pruritus)    Intolerances    MEDICATIONS  (STANDING):  aspirin enteric coated 81 milliGRAM(s) Oral daily  buMETAnide 2 milliGRAM(s) Oral two times a day  clopidogrel Tablet 75 milliGRAM(s) Oral daily  enoxaparin Injectable 30 milliGRAM(s) SubCutaneous daily  escitalopram 5 milliGRAM(s) Oral daily  hydrALAZINE 35 milliGRAM(s) Oral three times a day  HYDROmorphone  Injectable 0.2 milliGRAM(s) IV Push every 8 hours  insulin glargine Injectable (LANTUS) 12 Unit(s) SubCutaneous at bedtime  insulin lispro Injectable (ADMELOG) 10 Unit(s) SubCutaneous three times a day before meals  levothyroxine 50 MICROGram(s) Oral daily  LORazepam     Tablet 0.5 milliGRAM(s) Oral every 12 hours  melatonin 3 milliGRAM(s) Oral at bedtime  metoprolol succinate  milliGRAM(s) Oral daily  pantoprazole    Tablet 40 milliGRAM(s) Oral before breakfast  senna 2 Tablet(s) Oral at bedtime  sodium bicarbonate 650 milliGRAM(s) Oral daily  spironolactone 12.5 milliGRAM(s) Oral daily    MEDICATIONS  (PRN):  glycopyrrolate Injectable 0.4 milliGRAM(s) IV Push every 6 hours PRN Secretions.  HYDROmorphone  Injectable 0.2 milliGRAM(s) IV Push every 3 hours PRN Moderate to Severe pain  HYDROmorphone  Injectable 0.2 milliGRAM(s) IV Push every 3 hours PRN Dyspnea, labored breathing or RR > 28  LORazepam   Injectable 0.5 milliGRAM(s) IV Push every 1 hour PRN Anxiety  polyethylene glycol 3350 17 Gram(s) Oral two times a day PRN Constipation    ITEMS UNCHECKED ARE NOT PRESENT    PRESENT SYMPTOMS: [ ]Unable to obtain due to poor mentation   Source if other than patient:  [ ]Family   [ ]Team     Pain: [x ] yes [ ] no  QOL impact -   Location - generalized abdominal              Aggravating factors - none  Quality - pt unable to elucidate  Radiation - no  Timing-  Severity (0-10 scale): 3  Minimal acceptable level (0-10 scale):     Dyspnea:                           [ ]Mild [ ]Moderate [ ]Severe  Anxiety:                             [x ]Mild [ ]Moderate [ ]Severe  Fatigue:                             [ x]Mild [ ]Moderate [ ]Severe  Nausea:                             [ ]Mild [ ]Moderate [ ]Severe  Loss of appetite:              [ ]Mild [ ]Moderate [ ]Severe  Constipation:                    [ ]Mild [ ]Moderate [ ]Severe    PAINAD Score: 1    [email or web address] (Ctrl +  left click to view)  		  Other Symptoms:  [x ]All other review of systems negative     Palliative Performance Status Version 2:       40  %         http://npcrc.org/files/news/palliative_performance_scale_ppsv2.pdf  PHYSICAL EXAM:  Vital Signs Last 24 Hrs  T(C): 36.7 (29 Apr 2021 09:17), Max: 36.7 (29 Apr 2021 09:17)  T(F): 98 (29 Apr 2021 09:17), Max: 98 (29 Apr 2021 09:17)  HR: 77 (29 Apr 2021 09:17) (77 - 94)  BP: 89/60 (29 Apr 2021 09:17) (89/60 - 106/66)  BP(mean): --  RR: 18 (29 Apr 2021 09:17) (18 - 18)  SpO2: 99% (29 Apr 2021 09:17) (99% - 99%) I&O's Summary    28 Apr 2021 07:01  -  29 Apr 2021 07:00  --------------------------------------------------------  IN: 0 mL / OUT: 600 mL / NET: -600 mL    GENERAL:  [ ]Alert  [ ]Oriented    [ x]Lethargic  [ ]Cachexia  [ ]Unarousable  [x ]Verbal  [ ]Non-Verbal  Behavioral:   [ x] Anxiety  [ ] Delirium [ ] Agitation [ ] Other  HEENT:  [x ]Normal   [ ]Dry mouth   [ ]ET Tube/Trach  [ ]Oral lesions  PULMONARY:   [x ]Clear [ ]Tachypnea  [ ]Audible excessive secretions   [ ]Rhonchi        [ ]Right [ ]Left [ ]Bilateral  [ ]Crackles        [ ]Right [ ]Left [ ]Bilateral  [ ]Wheezing     [ ]Right [ ]Left [ ]Bilateral  [ ]Diminshed BS [ ]Right [ ]Left [ ]Bilateral    CARDIOVASCULAR:    [x ]Regular [ ]Irregular [ ]Tachy  [ ]Ramesh [ ]Murmur [ ]Other  GASTROINTESTINAL:  [x ]Soft  [ ]Distended   [x ]+BS  [ ]Non tender [ x]Tender  [ ]PEG [ ]OGT/ NGT   Last BM:   2 BM yesterday (4/28)    GENITOURINARY:  [ ]Normal [x ] Incontinent   [ ]Oliguria/Anuria   [ ]Oscar  MUSCULOSKELETAL:   [ ]Normal   [ x]Weakness  [ ]Bed/Wheelchair bound [ ]Edema  NEUROLOGIC:   [ x]No focal deficits  [ ] Cognitive impairment  [ ] Dysphagia [ ]Dysarthria [ ] Paresis [ ]Other   SKIN:   [x ]Normal  [ ]Rash     [ ]Pressure ulcer(s)  [ ]y [ ]n  Present on admission      CRITICAL CARE:  [ ] Shock Present  [ ]Septic [ ]Cardiogenic [ ]Neurologic [ ]Hypovolemic  [ ]  Vasopressors [ ]  Inotropes   [ ] Respiratory failure present [ ] Mechanical Ventilation [ ] Non-invasive ventilatory support [ ] High-Flow  [ ] Acute  [ ] Chronic [ ] Hypoxic  [ ] Hypercarbic [ ] Other  [ ] Other organ failure     LABS:  None new.           RADIOLOGY & ADDITIONAL STUDIES: None new.     PROTEIN CALORIE MALNUTRITION: [ ] mild [ ] moderate [ ] severe  [ ] underweight [ ] morbid obesity    https://www.andeal.org/vault/2440/web/files/ONC/Table_Clinical%20Characteristics%20to%20Document%20Malnutrition-White%20JV%20et%20al%202012.pdf    Height (cm): 149.9 (04-16-21 @ 22:31), 149.9 (01-13-21 @ 01:15)  Weight (kg): 55 (04-26-21 @ 07:33), 53 (01-25-21 @ 13:11)  BMI (kg/m2): 24.5 (04-26-21 @ 07:33), 23.6 (04-16-21 @ 22:31), 23.6 (01-25-21 @ 13:11)    [ ] PPSV2 < or = 30% [ ] significant weight loss [ ] poor nutritional intake [ ] anasarca Albumin, Serum: 3.4 g/dL (04-19-21 @ 01:13)    Artificial Nutrition [ ]     REFERRALS:   [ ]Chaplaincy  [ ] Hospice  [ ]Child Life  [ x]Social Work  [x ]Case management [ ]Holistic Therapy [ ] Physical Therapy [ ] Dietary   Goals of Care Document:

## 2021-04-29 NOTE — PROGRESS NOTE ADULT - PROBLEM SELECTOR PLAN 1
The patient required PRN Ativan 0.5mg IV X2 within the last 24hrs (8am-8am)  - c/w IV Ativan 1mg q1hr PRN  - c/w standing ativan 0.5mg PO q8hrs

## 2021-04-29 NOTE — PROGRESS NOTE ADULT - PROBLEM SELECTOR PLAN 4
Recurrent ASHF exacerbations, admitted for ASHF now better controlled after diuresis, currently euvolemic but will continue to monitor. TTE 4/19: EF 25-30%, mild MR with clip, severe global LV dysfunction, decreased RV dysfunction, mod-severe TR, mod pulm pressures.   - c/w bumex 2mg BID  - spironolactone 12.5mg daily  - c/w hydralazine, metoprolol Recurrent ADHF exacerbations, admitted for ASHF now better controlled after diuresis, currently euvolemic but will continue to monitor. TTE 4/19: EF 25-30%, mild MR with clip, severe global LV dysfunction, decreased RV dysfunction, mod-severe TR, mod pulm pressures.   - c/w bumex 2mg BID  - spironolactone 12.5mg daily  - c/w hydralazine, metoprolol

## 2021-04-29 NOTE — PROGRESS NOTE ADULT - ASSESSMENT
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 Pt speaks english and refuses  services preferring to converse in English.

## 2021-04-29 NOTE — PROGRESS NOTE ADULT - PROBLEM SELECTOR PLAN 8
Spoke with patient's son in law Zaman Mohsin this morning.   Questions answered.  Emotional support provided.  Patient stable for discharge. Discussed discharge planning today. Previously, Mohsin reported that the family can not take her home. The patient at this time still does not meet criteria for inpatient hospice. Plan is for NH with hospice care. Awaiting response from lester zhang.  -Will continue with symptom management in the PCU

## 2021-04-29 NOTE — PROGRESS NOTE ADULT - ATTENDING COMMENTS
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   Heart failure medically managed.  Patient is stable for discharge with hospice care.  Family unable to manage at home.  Awaiting response from Keyon Ferreira.

## 2021-04-30 DIAGNOSIS — I50.23 ACUTE ON CHRONIC SYSTOLIC (CONGESTIVE) HEART FAILURE: ICD-10-CM

## 2021-04-30 LAB
GLUCOSE BLDC GLUCOMTR-MCNC: 172 MG/DL — HIGH (ref 70–99)
GLUCOSE BLDC GLUCOMTR-MCNC: 259 MG/DL — HIGH (ref 70–99)
GLUCOSE BLDC GLUCOMTR-MCNC: 283 MG/DL — HIGH (ref 70–99)
GLUCOSE BLDC GLUCOMTR-MCNC: 363 MG/DL — HIGH (ref 70–99)
GLUCOSE BLDC GLUCOMTR-MCNC: 414 MG/DL — HIGH (ref 70–99)
SARS-COV-2 RNA SPEC QL NAA+PROBE: SIGNIFICANT CHANGE UP

## 2021-04-30 PROCEDURE — 99232 SBSQ HOSP IP/OBS MODERATE 35: CPT

## 2021-04-30 RX ORDER — INSULIN LISPRO 100/ML
VIAL (ML) SUBCUTANEOUS
Refills: 0 | Status: DISCONTINUED | OUTPATIENT
Start: 2021-04-30 | End: 2021-05-04

## 2021-04-30 RX ORDER — INSULIN LISPRO 100/ML
3 VIAL (ML) SUBCUTANEOUS ONCE
Refills: 0 | Status: COMPLETED | OUTPATIENT
Start: 2021-04-30 | End: 2021-04-30

## 2021-04-30 RX ORDER — INSULIN LISPRO 100/ML
VIAL (ML) SUBCUTANEOUS AT BEDTIME
Refills: 0 | Status: DISCONTINUED | OUTPATIENT
Start: 2021-04-30 | End: 2021-05-04

## 2021-04-30 RX ORDER — INSULIN GLARGINE 100 [IU]/ML
15 INJECTION, SOLUTION SUBCUTANEOUS AT BEDTIME
Refills: 0 | Status: DISCONTINUED | OUTPATIENT
Start: 2021-04-30 | End: 2021-05-01

## 2021-04-30 RX ADMIN — BUMETANIDE 2 MILLIGRAM(S): 0.25 INJECTION INTRAMUSCULAR; INTRAVENOUS at 17:17

## 2021-04-30 RX ADMIN — Medication 10 UNIT(S): at 08:39

## 2021-04-30 RX ADMIN — Medication 0.5 MILLIGRAM(S): at 17:21

## 2021-04-30 RX ADMIN — BUMETANIDE 2 MILLIGRAM(S): 0.25 INJECTION INTRAMUSCULAR; INTRAVENOUS at 06:21

## 2021-04-30 RX ADMIN — Medication 50 MICROGRAM(S): at 06:21

## 2021-04-30 RX ADMIN — HYDROMORPHONE HYDROCHLORIDE 0.2 MILLIGRAM(S): 2 INJECTION INTRAMUSCULAR; INTRAVENOUS; SUBCUTANEOUS at 17:17

## 2021-04-30 RX ADMIN — Medication 35 MILLIGRAM(S): at 14:35

## 2021-04-30 RX ADMIN — Medication 10 UNIT(S): at 17:44

## 2021-04-30 RX ADMIN — Medication 1: at 12:38

## 2021-04-30 RX ADMIN — Medication 3 UNIT(S): at 18:24

## 2021-04-30 RX ADMIN — HYDROMORPHONE HYDROCHLORIDE 0.2 MILLIGRAM(S): 2 INJECTION INTRAMUSCULAR; INTRAVENOUS; SUBCUTANEOUS at 23:32

## 2021-04-30 RX ADMIN — Medication 0.5 MILLIGRAM(S): at 22:18

## 2021-04-30 RX ADMIN — CLOPIDOGREL BISULFATE 75 MILLIGRAM(S): 75 TABLET, FILM COATED ORAL at 12:20

## 2021-04-30 RX ADMIN — Medication 6: at 17:44

## 2021-04-30 RX ADMIN — Medication 0.5 MILLIGRAM(S): at 06:21

## 2021-04-30 RX ADMIN — Medication 81 MILLIGRAM(S): at 12:20

## 2021-04-30 RX ADMIN — INSULIN GLARGINE 15 UNIT(S): 100 INJECTION, SOLUTION SUBCUTANEOUS at 22:19

## 2021-04-30 RX ADMIN — ENOXAPARIN SODIUM 30 MILLIGRAM(S): 100 INJECTION SUBCUTANEOUS at 12:20

## 2021-04-30 RX ADMIN — HYDROMORPHONE HYDROCHLORIDE 0.2 MILLIGRAM(S): 2 INJECTION INTRAMUSCULAR; INTRAVENOUS; SUBCUTANEOUS at 06:21

## 2021-04-30 RX ADMIN — HYDROMORPHONE HYDROCHLORIDE 0.2 MILLIGRAM(S): 2 INJECTION INTRAMUSCULAR; INTRAVENOUS; SUBCUTANEOUS at 12:21

## 2021-04-30 RX ADMIN — HYDROMORPHONE HYDROCHLORIDE 0.2 MILLIGRAM(S): 2 INJECTION INTRAMUSCULAR; INTRAVENOUS; SUBCUTANEOUS at 06:00

## 2021-04-30 RX ADMIN — ESCITALOPRAM OXALATE 5 MILLIGRAM(S): 10 TABLET, FILM COATED ORAL at 12:21

## 2021-04-30 RX ADMIN — Medication 10 UNIT(S): at 12:39

## 2021-04-30 RX ADMIN — Medication 1: at 22:20

## 2021-04-30 RX ADMIN — Medication 650 MILLIGRAM(S): at 12:21

## 2021-04-30 NOTE — PROGRESS NOTE ADULT - ATTENDING COMMENTS
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   Heart failure medically managed.  Patient is stable for discharge with hospice care.  Family unable to manage at home.  Awaiting response from Keyon Ferreira. 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   Heart failure medically managed.  Patient is stable for discharge with hospice care.  Family unable to manage at home.  Awaiting response from Keyon Ferreira.  Son updated as to medical condition and dc planning discussed.

## 2021-04-30 NOTE — PROVIDER CONTACT NOTE (CRITICAL VALUE NOTIFICATION) - ACTION/TREATMENT ORDERED:
Insulin administered per sliding scale
Provider notified. Repeat BG. Dextrose 50ml/hr No interventions at this time for trop level of 52.  continue to monitor.

## 2021-04-30 NOTE — PROGRESS NOTE ADULT - SUBJECTIVE AND OBJECTIVE BOX
GAP TEAM PALLIATIVE CARE UNIT PROGRESS NOTE:      [  ] Patient on hospice program.    INDICATION FOR PALLIATIVE CARE UNIT SERVICES: Symptomatic management of dyspnea and anxiety in setting of end stage heart failure and transition to end of life care    INTERVAL HPI/OVERNIGHT EVENTS: required 1 dilaudid overnight prn. Examined in AM. Pt lethargic but comfortable    DNR on chart: Yes      Allergies    azithromycin (Hives; Pruritus)    Intolerances    MEDICATIONS  (STANDING):  aspirin enteric coated 81 milliGRAM(s) Oral daily  buMETAnide 2 milliGRAM(s) Oral two times a day  clopidogrel Tablet 75 milliGRAM(s) Oral daily  enoxaparin Injectable 30 milliGRAM(s) SubCutaneous daily  escitalopram 5 milliGRAM(s) Oral daily  hydrALAZINE 35 milliGRAM(s) Oral three times a day  HYDROmorphone  Injectable 0.2 milliGRAM(s) IV Push every 6 hours  insulin glargine Injectable (LANTUS) 15 Unit(s) SubCutaneous at bedtime  insulin lispro (ADMELOG) corrective regimen sliding scale   SubCutaneous three times a day before meals  insulin lispro (ADMELOG) corrective regimen sliding scale   SubCutaneous at bedtime  insulin lispro Injectable (ADMELOG) 10 Unit(s) SubCutaneous three times a day before meals  levothyroxine 50 MICROGram(s) Oral daily  LORazepam     Tablet 0.5 milliGRAM(s) Oral every 12 hours  metoprolol succinate  milliGRAM(s) Oral daily  sodium bicarbonate 650 milliGRAM(s) Oral daily  spironolactone 12.5 milliGRAM(s) Oral daily    MEDICATIONS  (PRN):  bisacodyl Suppository 10 milliGRAM(s) Rectal daily PRN Constipation  glycopyrrolate Injectable 0.4 milliGRAM(s) IV Push every 6 hours PRN Secretions.  HYDROmorphone  Injectable 0.5 milliGRAM(s) IV Push every 1 hour PRN Moderate to Severe pain  HYDROmorphone  Injectable 0.5 milliGRAM(s) IV Push every 1 hour PRN Dyspnea, labored breathing or RR > 28  lactulose Syrup 10 Gram(s) Oral daily PRN Constipation  LORazepam   Injectable 0.5 milliGRAM(s) IV Push every 1 hour PRN Anxiety    ITEMS UNCHECKED ARE NOT PRESENT    PRESENT SYMPTOMS: [ ]Unable to obtain due to poor mentation   Source if other than patient:  [ ]Family   [ ]Team     Pain: [ ] yes [ x] no  QOL impact -   Location -                    Aggravating factors -  Quality -  Radiation -  Timing-  Severity (0-10 scale):  Minimal acceptable level (0-10 scale):     Dyspnea:                           [ ]Mild [ ]Moderate [ ]Severe  Anxiety:                             [ ]Mild [ ]Moderate [ ]Severe  Fatigue:                             [ ]Mild [ ]Moderate [ ]Severe  Nausea:                             [ ]Mild [ ]Moderate [ ]Severe  Loss of appetite:              [ ]Mild [ ]Moderate [ ]Severe  Constipation:                    [ ]Mild [ ]Moderate [ ]Severe    PAINAD Score:    [email or web address] (Ctrl +  left click to view)  		  Other Symptoms:  [x ]All other review of systems negative     Palliative Performance Status Version 2:   40      %         http://npcrc.org/files/news/palliative_performance_scale_[email or web address].pdf  PHYSICAL EXAM:  Vital Signs Last 24 Hrs  T(C): 36.5 (30 Apr 2021 07:17), Max: 36.5 (30 Apr 2021 07:17)  T(F): 97.7 (30 Apr 2021 07:17), Max: 97.7 (30 Apr 2021 07:17)  HR: 68 (30 Apr 2021 07:17) (68 - 77)  BP: 91/55 (30 Apr 2021 07:17) (82/49 - 99/59)  BP(mean): --  RR: 20 (30 Apr 2021 07:17) (20 - 20)  SpO2: 96% (30 Apr 2021 07:17) (96% - 96%) I&O's Summary  GENERAL:  [ ]Alert  [ ]Oriented    [x ]Lethargic  [ ]Cachexia  [ ]Unarousable  [x ]Verbal  [ ]Non-Verbal  Behavioral:   [x ] Anxiety  [ ] Delirium [ ] Agitation [ ] Other  HEENT:  [x ]Normal   [ ]Dry mouth   [ ]ET Tube/Trach  [ ]Oral lesions  PULMONARY:   [ ]Clear [ ]Tachypnea  [ ]Audible excessive secretions   [ ]Rhonchi        [ ]Right [ ]Left [ ]Bilateral  [x ]Crackles        [ x]Right [ ]Left [ ]Bilateral  [ ]Wheezing     [ ]Right [ ]Left [ ]Bilateral  [ ]Diminshed BS [ ]Right [ ]Left [ ]Bilateral    CARDIOVASCULAR:    [x ]Regular [ ]Irregular [ ]Tachy  [ ]Ramesh [ ]Murmur [ ]Other  GASTROINTESTINAL:  [x ]Soft  [ ]Distended   [x ]+BS  [ x]Non tender [ ]Tender  [ ]PEG [ ]OGT/ NGT   Last BM:   4/29    GENITOURINARY:  [ ]Normal [x ] Incontinent   [ ]Oliguria/Anuria   [ ]Oscar  MUSCULOSKELETAL:   [ ]Normal   [ x]Weakness  [ ]Bed/Wheelchair bound [ ]Edema  NEUROLOGIC:   [x ]No focal deficits  [ ] Cognitive impairment  [ ] Dysphagia [ ]Dysarthria [ ] Paresis [ ]Other   SKIN:   [ x]Normal  [ ]Rash     [ ]Pressure ulcer(s)  [ ]y [x ]n  Present on admission      CRITICAL CARE:  [ ] Shock Present  [ ]Septic [ ]Cardiogenic [ ]Neurologic [ ]Hypovolemic  [ ]  Vasopressors [ ]  Inotropes   [ ] Respiratory failure present [ ] Mechanical Ventilation [ ] Non-invasive ventilatory support [ ] High-Flow  [ ] Acute  [ ] Chronic [ ] Hypoxic  [ ] Hypercarbic [ ] Other  [ ] Other organ failure     LABS:            RADIOLOGY & ADDITIONAL STUDIES:    PROTEIN CALORIE MALNUTRITION: [ ] mild [ ] moderate [ ] severe  [ ] underweight [ ] morbid obesity    https://www.andeal.org/vault/2440/web/files/ONC/Table_Clinical%20Characteristics%20to%20Document%20Malnutrition-White%20JV%20et%20al%833688.pdf    Height (cm): 149.9 (04-16-21 @ 22:31), 149.9 (01-13-21 @ 01:15)  Weight (kg): 55 (04-26-21 @ 07:33), 53 (01-25-21 @ 13:11)  BMI (kg/m2): 24.5 (04-26-21 @ 07:33), 23.6 (04-16-21 @ 22:31), 23.6 (01-25-21 @ 13:11)    [ ] PPSV2 < or = 30% [ ] significant weight loss [ ] poor nutritional intake [ ] anasarca Albumin, Serum: 3.4 g/dL (04-19-21 @ 01:13)    Artificial Nutrition [ ]     REFERRALS:   [ ]Chaplaincy  [x ] Hospice  [ ]Child Life  [x ]Social Work  [ ]Case management [ ]Holistic Therapy [ ] Physical Therapy [ ] Dietary   Goals of Care Document: TAIWO Nicole (04-29-21 @ 14:53)  Goals of Care Conversation:    Participants:  · Participants  Family; Staff  · Child(ren) Mohsin  · Provider  Dr. Nicole, Dr. Perez    Advance Directives:  · Caregiver:  yes  · Name  Beena Negrete  · Phone Number  [phone]    Conversation Discussion:  · Conversation  Treatment Options  · Conversation Details  Discussed with son, pt's current clinical status including patient not taking all of her oral medications. We discussed the risks and benefits of stopping several of her oral medications that will not provide benefits given her current clinical status, including her statin. Son in agreement. Will continue to touch base with him and provide updates as needed. All questions/concerns addressed.    Personal Advance Directives Treatment Guidelines:   MOLST:  · Completed  20-Apr-2021    Location of Discussion:   Duration of Advanced Care Planning Meeting:  · Time spent (in minutes)  10    Location of Discussion:  · Location of discussion  Telephone      Electronic Signatures:  Kavon Nicole)  (Signed 29-Apr-2021 14:59)  	Authored: Goals of Care Conversation, Personal Advance Directives Treatment Guidelines, Location of Discussion      Last Updated: 29-Apr-2021 14:59 by Kavon Nicole)       GAP TEAM PALLIATIVE CARE UNIT PROGRESS NOTE:      [  ] Patient on hospice program.    INDICATION FOR PALLIATIVE CARE UNIT SERVICES: Symptomatic management of dyspnea and anxiety in setting of end stage heart failure and transition to end of life care    INTERVAL HPI/OVERNIGHT EVENTS: required 1 dilaudid overnight prn. Examined in AM. Pt lethargic but comfortable    DNR on chart: Yes      Allergies    azithromycin (Hives; Pruritus)    Intolerances    MEDICATIONS  (STANDING):  aspirin enteric coated 81 milliGRAM(s) Oral daily  buMETAnide 2 milliGRAM(s) Oral two times a day  clopidogrel Tablet 75 milliGRAM(s) Oral daily  enoxaparin Injectable 30 milliGRAM(s) SubCutaneous daily  escitalopram 5 milliGRAM(s) Oral daily  hydrALAZINE 35 milliGRAM(s) Oral three times a day  HYDROmorphone  Injectable 0.2 milliGRAM(s) IV Push every 6 hours  insulin glargine Injectable (LANTUS) 15 Unit(s) SubCutaneous at bedtime  insulin lispro (ADMELOG) corrective regimen sliding scale   SubCutaneous three times a day before meals  insulin lispro (ADMELOG) corrective regimen sliding scale   SubCutaneous at bedtime  insulin lispro Injectable (ADMELOG) 10 Unit(s) SubCutaneous three times a day before meals  levothyroxine 50 MICROGram(s) Oral daily  LORazepam     Tablet 0.5 milliGRAM(s) Oral every 12 hours  metoprolol succinate  milliGRAM(s) Oral daily  sodium bicarbonate 650 milliGRAM(s) Oral daily  spironolactone 12.5 milliGRAM(s) Oral daily    MEDICATIONS  (PRN):  bisacodyl Suppository 10 milliGRAM(s) Rectal daily PRN Constipation  glycopyrrolate Injectable 0.4 milliGRAM(s) IV Push every 6 hours PRN Secretions.  HYDROmorphone  Injectable 0.5 milliGRAM(s) IV Push every 1 hour PRN Moderate to Severe pain  HYDROmorphone  Injectable 0.5 milliGRAM(s) IV Push every 1 hour PRN Dyspnea, labored breathing or RR > 28  lactulose Syrup 10 Gram(s) Oral daily PRN Constipation  LORazepam   Injectable 0.5 milliGRAM(s) IV Push every 1 hour PRN Anxiety    ITEMS UNCHECKED ARE NOT PRESENT    PRESENT SYMPTOMS: [ ]Unable to obtain due to poor mentation   Source if other than patient:  [ ]Family   [ ]Team     Pain: [ ] yes [ x] no  QOL impact -   Location -                    Aggravating factors -  Quality -  Radiation -  Timing-  Severity (0-10 scale):  Minimal acceptable level (0-10 scale):     Dyspnea:                           [ ]Mild [ ]Moderate [ ]Severe  Anxiety:                             [ ]Mild [ ]Moderate [ ]Severe  Fatigue:                             [ ]Mild [ ]Moderate [ ]Severe  Nausea:                             [ ]Mild [ ]Moderate [ ]Severe  Loss of appetite:              [ ]Mild [ ]Moderate [ ]Severe  Constipation:                    [ ]Mild [ ]Moderate [ ]Severe    PAINAD Score:    [email or web address] (Ctrl +  left click to view)  		  Other Symptoms:  [x ]All other review of systems negative     Palliative Performance Status Version 2:   40      %         http://npcrc.org/files/news/palliative_performance_scale_[email or web address].pdf  PHYSICAL EXAM:  Vital Signs Last 24 Hrs  T(C): 36.5 (30 Apr 2021 07:17), Max: 36.5 (30 Apr 2021 07:17)  T(F): 97.7 (30 Apr 2021 07:17), Max: 97.7 (30 Apr 2021 07:17)  HR: 68 (30 Apr 2021 07:17) (68 - 77)  BP: 91/55 (30 Apr 2021 07:17) (82/49 - 99/59)  BP(mean): --  RR: 20 (30 Apr 2021 07:17) (20 - 20)  SpO2: 96% (30 Apr 2021 07:17) (96% - 96%) I&O's Summary  GENERAL:  [ ]Alert  [ ]Oriented    [x ]Lethargic  [ ]Cachexia  [ ]Unarousable  [x ]Verbal  [ ]Non-Verbal  Behavioral:   [x ] Anxiety  [ ] Delirium [ ] Agitation [ ] Other  HEENT:  [x ]Normal   [ ]Dry mouth   [ ]ET Tube/Trach  [ ]Oral lesions  PULMONARY:   [ ]Clear [ ]Tachypnea  [ ]Audible excessive secretions   [ ]Rhonchi        [ ]Right [ ]Left [ ]Bilateral  [x ]Crackles        [ x]Right [ ]Left [ ]Bilateral  [ ]Wheezing     [ ]Right [ ]Left [ ]Bilateral  [ ]Diminshed BS [ ]Right [ ]Left [ ]Bilateral    CARDIOVASCULAR:    [x ]Regular [ ]Irregular [ ]Tachy  [ ]Ramesh [ ]Murmur [ ]Other  GASTROINTESTINAL:  [x ]Soft  [ ]Distended   [x ]+BS  [ x]Non tender [ ]Tender  [ ]PEG [ ]OGT/ NGT   Last BM:   4/29    GENITOURINARY:  [ ]Normal [x ] Incontinent   [ ]Oliguria/Anuria   [ ]Oscar  MUSCULOSKELETAL:   [ ]Normal   [ x]Weakness  [ ]Bed/Wheelchair bound [ ]Edema  NEUROLOGIC:   [x ]No focal deficits  [ ] Cognitive impairment  [ ] Dysphagia [ ]Dysarthria [ ] Paresis [ ]Other   SKIN:   [ x]Normal  [ ]Rash     [ ]Pressure ulcer(s)  [ ]y [x ]n  Present on admission      CRITICAL CARE:  [ ] Shock Present  [ ]Septic [ ]Cardiogenic [ ]Neurologic [ ]Hypovolemic  [ ]  Vasopressors [ ]  Inotropes   [ ] Respiratory failure present [ ] Mechanical Ventilation [ ] Non-invasive ventilatory support [ ] High-Flow  [ ] Acute  [ ] Chronic [ ] Hypoxic  [ ] Hypercarbic [ ] Other  [ ] Other organ failure     LABS: None new.             RADIOLOGY & ADDITIONAL STUDIES: None new.     PROTEIN CALORIE MALNUTRITION: [ ] mild [ ] moderate [ ] severe  [ ] underweight [ ] morbid obesity    https://www.andeal.org/vault/2440/web/files/ONC/Table_Clinical%20Characteristics%20to%20Document%20Malnutrition-White%20JV%20et%20al%111718.pdf    Height (cm): 149.9 (04-16-21 @ 22:31), 149.9 (01-13-21 @ 01:15)  Weight (kg): 55 (04-26-21 @ 07:33), 53 (01-25-21 @ 13:11)  BMI (kg/m2): 24.5 (04-26-21 @ 07:33), 23.6 (04-16-21 @ 22:31), 23.6 (01-25-21 @ 13:11)    [ ] PPSV2 < or = 30% [ ] significant weight loss [ ] poor nutritional intake [ ] anasarca Albumin, Serum: 3.4 g/dL (04-19-21 @ 01:13)    Artificial Nutrition [ ]     REFERRALS:   [ ]Chaplaincy  [x ] Hospice  [ ]Child Life  [x ]Social Work  [ ]Case management [ ]Holistic Therapy [ ] Physical Therapy [ ] Dietary   Goals of Care Document: TAIWO Nicole (04-29-21 @ 14:53)  Goals of Care Conversation:    Participants:  · Participants  Family; Staff  · Child(ren) Mohsin  · Provider  Dr. Nicole, Dr. Perez    Advance Directives:  · Caregiver:  yes  · Name  Beena Negrete  · Phone Number  [phone]    Conversation Discussion:  · Conversation  Treatment Options  · Conversation Details  Discussed with son, pt's current clinical status including patient not taking all of her oral medications. We discussed the risks and benefits of stopping several of her oral medications that will not provide benefits given her current clinical status, including her statin. Son in agreement. Will continue to touch base with him and provide updates as needed. All questions/concerns addressed.    Personal Advance Directives Treatment Guidelines:   MOLST:  · Completed  20-Apr-2021    Location of Discussion:   Duration of Advanced Care Planning Meeting:  · Time spent (in minutes)  10    Location of Discussion:  · Location of discussion  Telephone      Electronic Signatures:  Kavon Nicole)  (Signed 29-Apr-2021 14:59)  	Authored: Goals of Care Conversation, Personal Advance Directives Treatment Guidelines, Location of Discussion      Last Updated: 29-Apr-2021 14:59 by Kavon Nicole)

## 2021-04-30 NOTE — PROGRESS NOTE ADULT - ASSESSMENT
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 Pt speaks english and refuses  services preferring to converse in English.

## 2021-04-30 NOTE — PROGRESS NOTE ADULT - PROBLEM SELECTOR PLAN 4
Recurrent acute decompensated HF exacerbations, admitted for acute decompensated HF now better controlled after diuresis, currently euvolemic but will continue to monitor. TTE 4/19: EF 25-30%, mild MR with clip, severe global LV dysfunction, decreased RV dysfunction, mod-severe TR, mod pulm pressures.   - c/w bumex 2mg BID  - spironolactone 12.5mg daily  - c/w hydralazine, metoprolol

## 2021-04-30 NOTE — PROGRESS NOTE ADULT - PROBLEM SELECTOR PLAN 6
Most likely due to ischemic/valvular heart disease  -repeat TTE shows moderate pulm HTN Most likely due to ischemic/valvular heart disease  -repeat TTE shows moderate pulm HTN  Monitoring for hypoxemia

## 2021-04-30 NOTE — PROGRESS NOTE ADULT - PROBLEM SELECTOR PLAN 8
Spoke with patient's son in law Zaman Mohsin this morning.   Questions answered.  Emotional support provided.  Patient stable for discharge. Discussed discharge planning today. Mr Mohsin emphasized that he wants the pt to go to [facility]. Previously, Mohsin reported that the family can not take her home. The patient at this time still does not meet criteria for inpatient hospice. Plan is for NH with hospice care. [facility]waiting response from [facility].  -Will continue with symptom management in the PCU Spoke with patient's son in law Zaman Mohsin today.   Questions answered.  Emotional support provided.  Patient stable for discharge. Discussed discharge planning today.  Mr Mohsin emphasized that he wants the pt to go to [facility]. Previously, Mohsin reported that the family can not take her home. The patient at this time still does not meet criteria for inpatient hospice. Plan is for NH with hospice care. Awaiting response from [facility].  -Will continue with symptom management in the PCU

## 2021-05-01 LAB
GLUCOSE BLDC GLUCOMTR-MCNC: 138 MG/DL — HIGH (ref 70–99)
GLUCOSE BLDC GLUCOMTR-MCNC: 213 MG/DL — HIGH (ref 70–99)
GLUCOSE BLDC GLUCOMTR-MCNC: 282 MG/DL — HIGH (ref 70–99)
GLUCOSE BLDC GLUCOMTR-MCNC: 352 MG/DL — HIGH (ref 70–99)

## 2021-05-01 PROCEDURE — 99233 SBSQ HOSP IP/OBS HIGH 50: CPT | Mod: GC

## 2021-05-01 RX ORDER — INSULIN GLARGINE 100 [IU]/ML
17 INJECTION, SOLUTION SUBCUTANEOUS AT BEDTIME
Refills: 0 | Status: DISCONTINUED | OUTPATIENT
Start: 2021-05-01 | End: 2021-05-04

## 2021-05-01 RX ADMIN — ESCITALOPRAM OXALATE 5 MILLIGRAM(S): 10 TABLET, FILM COATED ORAL at 12:53

## 2021-05-01 RX ADMIN — HYDROMORPHONE HYDROCHLORIDE 0.5 MILLIGRAM(S): 2 INJECTION INTRAMUSCULAR; INTRAVENOUS; SUBCUTANEOUS at 20:08

## 2021-05-01 RX ADMIN — HYDROMORPHONE HYDROCHLORIDE 0.2 MILLIGRAM(S): 2 INJECTION INTRAMUSCULAR; INTRAVENOUS; SUBCUTANEOUS at 12:53

## 2021-05-01 RX ADMIN — Medication 35 MILLIGRAM(S): at 22:21

## 2021-05-01 RX ADMIN — SPIRONOLACTONE 12.5 MILLIGRAM(S): 25 TABLET, FILM COATED ORAL at 05:42

## 2021-05-01 RX ADMIN — Medication 3: at 18:13

## 2021-05-01 RX ADMIN — Medication 50 MICROGRAM(S): at 06:25

## 2021-05-01 RX ADMIN — Medication 0.5 MILLIGRAM(S): at 22:26

## 2021-05-01 RX ADMIN — Medication 0.5 MILLIGRAM(S): at 17:11

## 2021-05-01 RX ADMIN — Medication 2: at 13:19

## 2021-05-01 RX ADMIN — Medication 81 MILLIGRAM(S): at 12:53

## 2021-05-01 RX ADMIN — HYDROMORPHONE HYDROCHLORIDE 0.5 MILLIGRAM(S): 2 INJECTION INTRAMUSCULAR; INTRAVENOUS; SUBCUTANEOUS at 08:55

## 2021-05-01 RX ADMIN — ENOXAPARIN SODIUM 30 MILLIGRAM(S): 100 INJECTION SUBCUTANEOUS at 12:53

## 2021-05-01 RX ADMIN — Medication 650 MILLIGRAM(S): at 12:54

## 2021-05-01 RX ADMIN — HYDROMORPHONE HYDROCHLORIDE 0.2 MILLIGRAM(S): 2 INJECTION INTRAMUSCULAR; INTRAVENOUS; SUBCUTANEOUS at 05:43

## 2021-05-01 RX ADMIN — BUMETANIDE 2 MILLIGRAM(S): 0.25 INJECTION INTRAMUSCULAR; INTRAVENOUS at 05:42

## 2021-05-01 RX ADMIN — Medication 0.5 MILLIGRAM(S): at 02:05

## 2021-05-01 RX ADMIN — INSULIN GLARGINE 17 UNIT(S): 100 INJECTION, SOLUTION SUBCUTANEOUS at 22:21

## 2021-05-01 RX ADMIN — CLOPIDOGREL BISULFATE 75 MILLIGRAM(S): 75 TABLET, FILM COATED ORAL at 12:54

## 2021-05-01 RX ADMIN — HYDROMORPHONE HYDROCHLORIDE 0.2 MILLIGRAM(S): 2 INJECTION INTRAMUSCULAR; INTRAVENOUS; SUBCUTANEOUS at 17:11

## 2021-05-01 RX ADMIN — Medication 0.5 MILLIGRAM(S): at 05:40

## 2021-05-01 RX ADMIN — Medication 5: at 08:40

## 2021-05-01 RX ADMIN — Medication 10 UNIT(S): at 08:41

## 2021-05-01 RX ADMIN — Medication 10 UNIT(S): at 13:21

## 2021-05-01 RX ADMIN — HYDROMORPHONE HYDROCHLORIDE 0.5 MILLIGRAM(S): 2 INJECTION INTRAMUSCULAR; INTRAVENOUS; SUBCUTANEOUS at 08:40

## 2021-05-01 RX ADMIN — Medication 10 UNIT(S): at 18:14

## 2021-05-01 NOTE — PROGRESS NOTE ADULT - PROBLEM SELECTOR PLAN 2
- c/w Dilaudid 0.2mg IVP q6 ATC   - c/w Dilaudid 0.5mg IV q1 prn dyspnea  - c/w bowel regimen while patient is on opioids

## 2021-05-01 NOTE — PROGRESS NOTE ADULT - PROBLEM SELECTOR PLAN 6
Most likely due to ischemic/valvular heart disease  -repeat TTE shows moderate pulm HTN  Monitoring for hypoxemia

## 2021-05-01 NOTE — PROGRESS NOTE ADULT - SUBJECTIVE AND OBJECTIVE BOX
GAP TEAM PALLIATIVE CARE UNIT PROGRESS NOTE:      [  ] Patient on hospice program.    INDICATION FOR PALLIATIVE CARE UNIT SERVICES: Symptomatic management of dyspnea and anxiety in setting of end stage heart failure and transition to end of life care    INTERVAL HPI/OVERNIGHT EVENTS:   Required 1 prn dose of Dilaudid for pan and 2 prn does of Ativan in past 24 hours  Patient seen and examined at bedside. Denies any acute complaints, including pain.     DNR on chart: Yes      Allergies    azithromycin (Hives; Pruritus)    Intolerances    MEDICATIONS  (STANDING):  aspirin enteric coated 81 milliGRAM(s) Oral daily  buMETAnide 2 milliGRAM(s) Oral two times a day  clopidogrel Tablet 75 milliGRAM(s) Oral daily  enoxaparin Injectable 30 milliGRAM(s) SubCutaneous daily  escitalopram 5 milliGRAM(s) Oral daily  hydrALAZINE 35 milliGRAM(s) Oral three times a day  HYDROmorphone  Injectable 0.2 milliGRAM(s) IV Push every 6 hours  insulin glargine Injectable (LANTUS) 17 Unit(s) SubCutaneous at bedtime  insulin lispro (ADMELOG) corrective regimen sliding scale   SubCutaneous at bedtime  insulin lispro (ADMELOG) corrective regimen sliding scale   SubCutaneous three times a day before meals  insulin lispro Injectable (ADMELOG) 10 Unit(s) SubCutaneous three times a day before meals  levothyroxine 50 MICROGram(s) Oral daily  LORazepam     Tablet 0.5 milliGRAM(s) Oral every 12 hours  metoprolol succinate  milliGRAM(s) Oral daily  sodium bicarbonate 650 milliGRAM(s) Oral daily  spironolactone 12.5 milliGRAM(s) Oral daily    MEDICATIONS  (PRN):  bisacodyl Suppository 10 milliGRAM(s) Rectal daily PRN Constipation  glycopyrrolate Injectable 0.4 milliGRAM(s) IV Push every 6 hours PRN Secretions.  HYDROmorphone  Injectable 0.5 milliGRAM(s) IV Push every 1 hour PRN Moderate to Severe pain  HYDROmorphone  Injectable 0.5 milliGRAM(s) IV Push every 1 hour PRN Dyspnea, labored breathing or RR > 28  lactulose Syrup 10 Gram(s) Oral daily PRN Constipation  LORazepam   Injectable 0.5 milliGRAM(s) IV Push every 1 hour PRN Anxiety      ITEMS UNCHECKED ARE NOT PRESENT    PRESENT SYMPTOMS: [ ]Unable to obtain due to poor mentation   Source if other than patient:  [ ]Family   [ ]Team     Pain: [ ] yes [ x] no  QOL impact -   Location -                    Aggravating factors -  Quality -  Radiation -  Timing-  Severity (0-10 scale):  Minimal acceptable level (0-10 scale):     Dyspnea:                           [ ]Mild [ ]Moderate [ ]Severe  Anxiety:                             [ ]Mild [ ]Moderate [ ]Severe  Fatigue:                             [ ]Mild [ ]Moderate [ ]Severe  Nausea:                             [ ]Mild [ ]Moderate [ ]Severe  Loss of appetite:              [ ]Mild [ ]Moderate [ ]Severe  Constipation:                    [ ]Mild [ ]Moderate [ ]Severe    PAINAD Score: 0     [email or web address] (Ctrl +  left click to view)  		  Other Symptoms:  [x ]All other review of systems negative     Palliative Performance Status Version 2:   40      %         http://[email or web address]/files/news/palliative_performance_scale_[email or web address].pdf    PHYSICAL EXAM:  Vital Signs Last 24 Hrs  T(C): 35.7 (01 May 2021 05:31), Max: 35.7 (01 May 2021 05:31)  T(F): 96.3 (01 May 2021 05:31), Max: 96.3 (01 May 2021 05:31)  HR: 91 (01 May 2021 05:31) (85 - 91)  BP: 98/64 (01 May 2021 13:37) (98/64 - 115/68)  BP(mean): --  RR: --  SpO2: --    GENERAL:  [ ]Alert  [ ]Oriented    [x ]Lethargic  [ ]Cachexia  [ ]Unarousable  [x ]Verbal  [ ]Non-Verbal  Behavioral:   [ ] Anxiety  [ ] Delirium [ ] Agitation [ ] Other  HEENT:  [x ]Normal   [ ]Dry mouth   [ ]ET Tube/Trach  [ ]Oral lesions  PULMONARY:   [ ]Clear [ ]Tachypnea  [ ]Audible excessive secretions   [ ]Rhonchi        [ ]Right [ ]Left [ ]Bilateral  [ ]Crackles        [ ]Right [ ]Left [ ]Bilateral  [ ]Wheezing     [ ]Right [ ]Left [ ]Bilateral  [x ]Diminshed BS [ ]Right [ ]Left [ x]Bilateral    CARDIOVASCULAR:    [x ]Regular [ ]Irregular [ ]Tachy  [ ]Ramesh [ ]Murmur [ ]Other  GASTROINTESTINAL:  [x ]Soft  [ ]Distended   [x ]+BS  [ x]Non tender [ ]Tender  [ ]PEG [ ]OGT/ NGT   Last BM:   4/30  GENITOURINARY:  [ ]Normal [x ] Incontinent   [ ]Oliguria/Anuria   [ ]Oscar  MUSCULOSKELETAL:   [ ]Normal   [ x]Weakness  [ ]Bed/Wheelchair bound [ ]Edema  NEUROLOGIC:   [x ]No focal deficits  [ ] Cognitive impairment  [ ] Dysphagia [ ]Dysarthria [ ] Paresis [ ]Other   SKIN:   [ x]Normal  [ ]Rash     [ ]Pressure ulcer(s)  [ ]y [x ]n  Present on admission      CRITICAL CARE:  [ ] Shock Present  [ ]Septic [ ]Cardiogenic [ ]Neurologic [ ]Hypovolemic  [ ]  Vasopressors [ ]  Inotropes   [ ] Respiratory failure present [ ] Mechanical Ventilation [ ] Non-invasive ventilatory support [ ] High-Flow  [ ] Acute  [ ] Chronic [ ] Hypoxic  [ ] Hypercarbic [ ] Other  [ ] Other organ failure     LABS: None new.     RADIOLOGY & ADDITIONAL STUDIES: None new.     PROTEIN CALORIE MALNUTRITION: [ ] mild [ ] moderate [ ] severe  [ ] underweight [ ] morbid obesity    https://www.andeal.org/vault/2440/web/files/ONC/Table_Clinical%20Characteristics%20to%20Document%20Malnutrition-White%20JV%20et%20al%724440.pdf    Height (cm): 149.9 (04-16-21 @ 22:31), 149.9 (01-13-21 @ 01:15)  Weight (kg): 55 (04-26-21 @ 07:33), 53 (01-25-21 @ 13:11)  BMI (kg/m2): 24.5 (04-26-21 @ 07:33), 23.6 (04-16-21 @ 22:31), 23.6 (01-25-21 @ 13:11)    [ ] PPSV2 < or = 30% [ ] significant weight loss [ ] poor nutritional intake [ ] anasarca Albumin, Serum: 3.4 g/dL (04-19-21 @ 01:13)    Artificial Nutrition [ ]     REFERRALS:   [ ]Chaplaincy  [x ] Hospice  [ ]Child Life  [x ]Social Work  [ ]Case management [ ]Holistic Therapy [ ] Physical Therapy [ ] Dietary   Goals of Care Document: TAIWO Nicole (04-29-21 @ 14:53)  Goals of Care Conversation:    Participants:  · Participants  Family; Staff  · Child(ren) Mohsin  · Provider  Dr. Nicole, Dr. Perez    Advance Directives:  · Caregiver:  yes  · Name  Beena Negrete  · Phone Number  [phone]    Conversation Discussion:  · Conversation  Treatment Options  · Conversation Details  Discussed with son, pt's current clinical status including patient not taking all of her oral medications. We discussed the risks and benefits of stopping several of her oral medications that will not provide benefits given her current clinical status, including her statin. Son in agreement. Will continue to touch base with him and provide updates as needed. All questions/concerns addressed.    Personal Advance Directives Treatment Guidelines:   MOLST:  · Completed  20-Apr-2021    Location of Discussion:   Duration of Advanced Care Planning Meeting:  · Time spent (in minutes)  10    Location of Discussion:  · Location of discussion  Telephone      Electronic Signatures:  Kavon Nicole)  (Signed 29-Apr-2021 14:59)  	Authored: Goals of Care Conversation, Personal Advance Directives Treatment Guidelines, Location of Discussion      Last Updated: 29-Apr-2021 14:59 by Kavon Nicole)

## 2021-05-01 NOTE — PROGRESS NOTE ADULT - ASSESSMENT
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 Pt speaks english and refuses  services preferring to converse in English.

## 2021-05-01 NOTE — PROGRESS NOTE ADULT - PROBLEM SELECTOR PLAN 8
-Will continue with symptom management in the PCU  - Plan is for NH with hospice care. Awaiting [facility] from [facility].

## 2021-05-02 LAB
GLUCOSE BLDC GLUCOMTR-MCNC: 170 MG/DL — HIGH (ref 70–99)
GLUCOSE BLDC GLUCOMTR-MCNC: 212 MG/DL — HIGH (ref 70–99)
GLUCOSE BLDC GLUCOMTR-MCNC: 291 MG/DL — HIGH (ref 70–99)
GLUCOSE BLDC GLUCOMTR-MCNC: 91 MG/DL — SIGNIFICANT CHANGE UP (ref 70–99)

## 2021-05-02 PROCEDURE — 99233 SBSQ HOSP IP/OBS HIGH 50: CPT | Mod: GC

## 2021-05-02 RX ORDER — HYDROMORPHONE HYDROCHLORIDE 2 MG/ML
0.5 INJECTION INTRAMUSCULAR; INTRAVENOUS; SUBCUTANEOUS EVERY 6 HOURS
Refills: 0 | Status: DISCONTINUED | OUTPATIENT
Start: 2021-05-02 | End: 2021-05-04

## 2021-05-02 RX ADMIN — Medication 100 MILLIGRAM(S): at 05:28

## 2021-05-02 RX ADMIN — Medication 10 UNIT(S): at 13:23

## 2021-05-02 RX ADMIN — HYDROMORPHONE HYDROCHLORIDE 0.2 MILLIGRAM(S): 2 INJECTION INTRAMUSCULAR; INTRAVENOUS; SUBCUTANEOUS at 05:29

## 2021-05-02 RX ADMIN — BUMETANIDE 2 MILLIGRAM(S): 0.25 INJECTION INTRAMUSCULAR; INTRAVENOUS at 05:28

## 2021-05-02 RX ADMIN — Medication 10 UNIT(S): at 09:03

## 2021-05-02 RX ADMIN — HYDROMORPHONE HYDROCHLORIDE 0.5 MILLIGRAM(S): 2 INJECTION INTRAMUSCULAR; INTRAVENOUS; SUBCUTANEOUS at 20:13

## 2021-05-02 RX ADMIN — CLOPIDOGREL BISULFATE 75 MILLIGRAM(S): 75 TABLET, FILM COATED ORAL at 13:14

## 2021-05-02 RX ADMIN — Medication 1: at 13:23

## 2021-05-02 RX ADMIN — SPIRONOLACTONE 12.5 MILLIGRAM(S): 25 TABLET, FILM COATED ORAL at 05:28

## 2021-05-02 RX ADMIN — Medication 2: at 17:56

## 2021-05-02 RX ADMIN — Medication 10 MILLIGRAM(S): at 17:44

## 2021-05-02 RX ADMIN — Medication 50 MICROGRAM(S): at 05:28

## 2021-05-02 RX ADMIN — Medication 0.5 MILLIGRAM(S): at 17:28

## 2021-05-02 RX ADMIN — HYDROMORPHONE HYDROCHLORIDE 0.5 MILLIGRAM(S): 2 INJECTION INTRAMUSCULAR; INTRAVENOUS; SUBCUTANEOUS at 09:09

## 2021-05-02 RX ADMIN — Medication 81 MILLIGRAM(S): at 13:14

## 2021-05-02 RX ADMIN — HYDROMORPHONE HYDROCHLORIDE 0.5 MILLIGRAM(S): 2 INJECTION INTRAMUSCULAR; INTRAVENOUS; SUBCUTANEOUS at 21:28

## 2021-05-02 RX ADMIN — ENOXAPARIN SODIUM 30 MILLIGRAM(S): 100 INJECTION SUBCUTANEOUS at 13:14

## 2021-05-02 RX ADMIN — Medication 0.5 MILLIGRAM(S): at 22:02

## 2021-05-02 RX ADMIN — HYDROMORPHONE HYDROCHLORIDE 0.2 MILLIGRAM(S): 2 INJECTION INTRAMUSCULAR; INTRAVENOUS; SUBCUTANEOUS at 00:35

## 2021-05-02 RX ADMIN — ESCITALOPRAM OXALATE 5 MILLIGRAM(S): 10 TABLET, FILM COATED ORAL at 13:13

## 2021-05-02 RX ADMIN — Medication 0.5 MILLIGRAM(S): at 05:28

## 2021-05-02 RX ADMIN — HYDROMORPHONE HYDROCHLORIDE 0.5 MILLIGRAM(S): 2 INJECTION INTRAMUSCULAR; INTRAVENOUS; SUBCUTANEOUS at 17:28

## 2021-05-02 RX ADMIN — Medication 650 MILLIGRAM(S): at 13:14

## 2021-05-02 RX ADMIN — HYDROMORPHONE HYDROCHLORIDE 0.5 MILLIGRAM(S): 2 INJECTION INTRAMUSCULAR; INTRAVENOUS; SUBCUTANEOUS at 09:24

## 2021-05-02 RX ADMIN — Medication 3: at 09:03

## 2021-05-02 RX ADMIN — Medication 35 MILLIGRAM(S): at 05:29

## 2021-05-02 RX ADMIN — HYDROMORPHONE HYDROCHLORIDE 0.2 MILLIGRAM(S): 2 INJECTION INTRAMUSCULAR; INTRAVENOUS; SUBCUTANEOUS at 13:15

## 2021-05-02 RX ADMIN — INSULIN GLARGINE 17 UNIT(S): 100 INJECTION, SOLUTION SUBCUTANEOUS at 22:03

## 2021-05-02 RX ADMIN — Medication 10 UNIT(S): at 17:55

## 2021-05-02 RX ADMIN — Medication 0.5 MILLIGRAM(S): at 04:48

## 2021-05-02 NOTE — PROGRESS NOTE ADULT - SUBJECTIVE AND OBJECTIVE BOX
GAP TEAM PALLIATIVE CARE UNIT PROGRESS NOTE:      [  ] Patient on hospice program.    INDICATION FOR PALLIATIVE CARE UNIT SERVICES: Symptomatic management of dyspnea and anxiety in setting of end stage heart failure and transition to end of life care    INTERVAL HPI/OVERNIGHT EVENTS:   Required 1 prn dose of Dilaudid for pain, 1 prn dose of Dilaudid for dyspnea, and 2 prn dose of Ativan.   Patient seen and examined at bedside. Patient moaning and grimacing.     DNR on chart: Yes      Allergies    azithromycin (Hives; Pruritus)    Intolerances    MEDICATIONS  (STANDING):  aspirin enteric coated 81 milliGRAM(s) Oral daily  buMETAnide 2 milliGRAM(s) Oral two times a day  clopidogrel Tablet 75 milliGRAM(s) Oral daily  enoxaparin Injectable 30 milliGRAM(s) SubCutaneous daily  escitalopram 5 milliGRAM(s) Oral daily  hydrALAZINE 35 milliGRAM(s) Oral three times a day  HYDROmorphone  Injectable 0.5 milliGRAM(s) IV Push every 6 hours  insulin glargine Injectable (LANTUS) 17 Unit(s) SubCutaneous at bedtime  insulin lispro (ADMELOG) corrective regimen sliding scale   SubCutaneous three times a day before meals  insulin lispro (ADMELOG) corrective regimen sliding scale   SubCutaneous at bedtime  insulin lispro Injectable (ADMELOG) 10 Unit(s) SubCutaneous three times a day before meals  levothyroxine 50 MICROGram(s) Oral daily  LORazepam   Injectable 0.5 milliGRAM(s) IV Push every 8 hours  metoprolol succinate  milliGRAM(s) Oral daily  sodium bicarbonate 650 milliGRAM(s) Oral daily  spironolactone 12.5 milliGRAM(s) Oral daily    MEDICATIONS  (PRN):  bisacodyl Suppository 10 milliGRAM(s) Rectal daily PRN Constipation  glycopyrrolate Injectable 0.4 milliGRAM(s) IV Push every 6 hours PRN Secretions.  HYDROmorphone  Injectable 0.5 milliGRAM(s) IV Push every 1 hour PRN Moderate to Severe pain  HYDROmorphone  Injectable 0.5 milliGRAM(s) IV Push every 1 hour PRN Dyspnea, labored breathing or RR > 28  lactulose Syrup 10 Gram(s) Oral daily PRN Constipation  LORazepam   Injectable 0.5 milliGRAM(s) IV Push every 1 hour PRN Anxiety        ITEMS UNCHECKED ARE NOT PRESENT    PRESENT SYMPTOMS: [ ]Unable to obtain due to poor mentation   Source if other than patient:  [ ]Family   [ ]Team     Pain: [x ] yes [] no - See Pain AD score  QOL impact -   Location -                    Aggravating factors -  Quality -  Radiation -  Timing-  Severity (0-10 scale):  Minimal acceptable level (0-10 scale):     Dyspnea:                           [ ]Mild [ ]Moderate [ ]Severe  Anxiety:                             [ ]Mild [ ]Moderate [ ]Severe  Fatigue:                             [ ]Mild [ ]Moderate [ ]Severe  Nausea:                             [ ]Mild [ ]Moderate [ ]Severe  Loss of appetite:              [ ]Mild [ ]Moderate [ ]Severe  Constipation:                    [ ]Mild [ ]Moderate [ ]Severe    PAINAD Score: 0 -6    [email or web address] (Ctrl +  left click to view)  		  Other Symptoms:  [x ]All other review of systems negative     Palliative Performance Status Version 2:   40      %         [email or web address]    PHYSICAL EXAM:  Vital Signs Last 24 Hrs  T(C): 37.2 (02 May 2021 08:48), Max: 37.2 (02 May 2021 08:48)  T(F): 99 (02 May 2021 08:48), Max: 99 (02 May 2021 08:48)  HR: 83 (02 May 2021 08:48) (83 - 98)  BP: 99/53 (02 May 2021 08:48) (99/53 - 109/68)  BP(mean): --  RR: 20 (02 May 2021 08:48) (18 - 20)  SpO2: 95% (02 May 2021 08:48) (93% - 96%)    GENERAL:  [ ]Alert  [ ]Oriented    [x ]Lethargic  [ ]Cachexia  [ ]Unarousable  [x ]Verbal- moans  [ ]Non-Verbal  Behavioral:   [ ] Anxiety  [ ] Delirium [ ] Agitation [x ] Other - appears uncomfortable   HEENT:  [x ]Normal   [ ]Dry mouth   [ ]ET Tube/Trach  [ ]Oral lesions  PULMONARY:   [ ]Clear [ ]Tachypnea  [ ]Audible excessive secretions   [ ]Rhonchi        [ ]Right [ ]Left [ ]Bilateral  [ ]Crackles        [ ]Right [ ]Left [ ]Bilateral  [ ]Wheezing     [ ]Right [ ]Left [ ]Bilateral  [x ]Diminshed BS [ ]Right [ ]Left [ x]Bilateral    CARDIOVASCULAR:    [x ]Regular [ ]Irregular [ ]Tachy  [ ]Ramesh [ ]Murmur [ ]Other  GASTROINTESTINAL:  [x ]Soft  [ ]Distended   [x ]+BS  [ x]Non tender [ ]Tender  [ ]PEG [ ]OGT/ NGT   Last BM:   5/1  GENITOURINARY:  [ ]Normal [x ] Incontinent   [ ]Oliguria/Anuria   [ ]Oscar  MUSCULOSKELETAL:   [ ]Normal   [ x]Weakness  [ ]Bed/Wheelchair bound [ ]Edema  NEUROLOGIC:   [x ]No focal deficits  [ ] Cognitive impairment  [ ] Dysphagia [ ]Dysarthria [ ] Paresis [ ]Other   SKIN:   [ x]Normal  [ ]Rash     [ ]Pressure ulcer(s)  [ ]y [x ]n  Present on admission      CRITICAL CARE:  [ ] Shock Present  [ ]Septic [ ]Cardiogenic [ ]Neurologic [ ]Hypovolemic  [ ]  Vasopressors [ ]  Inotropes   [ ] Respiratory failure present [ ] Mechanical Ventilation [ ] Non-invasive ventilatory support [ ] High-Flow  [ ] Acute  [ ] Chronic [ ] Hypoxic  [ ] Hypercarbic [ ] Other  [ ] Other organ failure     LABS: None new.     RADIOLOGY & ADDITIONAL STUDIES: None new.     PROTEIN CALORIE MALNUTRITION: [ ] mild [ ] moderate [ ] severe  [ ] underweight [ ] morbid obesity    https://www.andeal.org/vault/2440/web/files/ONC/Table_Clinical%20Characteristics%20to%20Document%20Malnutrition-White%20JV%20et%20al%854012.pdf    Height (cm): 149.9 (04-16-21 @ 22:31), 149.9 (01-13-21 @ 01:15)  Weight (kg): 55 (04-26-21 @ 07:33), 53 (01-25-21 @ 13:11)  BMI (kg/m2): 24.5 (04-26-21 @ 07:33), 23.6 (04-16-21 @ 22:31), 23.6 (01-25-21 @ 13:11)    [ ] PPSV2 < or = 30% [ ] significant weight loss [ ] poor nutritional intake [ ] anasarca Albumin, Serum: 3.4 g/dL (04-19-21 @ 01:13)    Artificial Nutrition [ ]     REFERRALS:   [ ]Chaplaincy  [x ] Hospice  [ ]Child Life  [x ]Social Work  [ ]Case management [ ]Holistic Therapy [ ] Physical Therapy [ ] Dietary   Goals of Care Document: TAIWO Nicole (04-29-21 @ 14:53)  Goals of Care Conversation:    Participants:  · Participants  Family; Staff  · Child(michelle)  Mohsin  · Provider  Dr. Nicole, Dr. Perez    Advance Directives:  · Caregiver:  yes  · Name  Beena Negrete  · Phone Number  [phone]    Conversation Discussion:  · Conversation  Treatment Options  · Conversation Details  Discussed with son, pt's current clinical status including patient not taking all of her oral medications. We discussed the risks and benefits of stopping several of her oral medications that will not provide benefits given her current clinical status, including her statin. Son in agreement. Will continue to touch base with him and provide updates as needed. All questions/concerns addressed.    Personal Advance Directives Treatment Guidelines:   MOLST:  · Completed  20-Apr-2021    Location of Discussion:   Duration of Advanced Care Planning Meeting:  · Time spent (in minutes)  10    Location of Discussion:  · Location of discussion  Telephone      Electronic Signatures:  Kavon Nicole)  (Signed 29-Apr-2021 14:59)  	Authored: Goals of Care Conversation, Personal Advance Directives Treatment Guidelines, Location of Discussion      Last Updated: 29-Apr-2021 14:59 by Kavon Nicole)

## 2021-05-02 NOTE — PROGRESS NOTE ADULT - PROBLEM SELECTOR PLAN 8
- Spoke with daughter Elsa at bedside. Discussed that patient has been declining the past few days; daughter agrees and is aware. Elsa verbalizes that her goal is for patient to be comfortable; discussed adjusting changes in medicaiton regimen to help patient to be comfortable. Emotional support provided to daughter.   - Will continue with symptom management in the PCU

## 2021-05-02 NOTE — PROGRESS NOTE ADULT - PROBLEM SELECTOR PLAN 2
- Increase to  Dilaudid 0.5mg IVP q6 ATC   - c/w Dilaudid 0.5mg IV q1 prn dyspnea  - c/w bowel regimen while patient is on opioids

## 2021-05-02 NOTE — PROGRESS NOTE ADULT - ASSESSMENT
75 Slovak speaking F PMH IDDM, CAD s/p CABG 2014 w prior PCI, severe mitral regurg (s/p mitral clip '19), pHTN, HFrEF (21%), CKDIV not on  HD with frequent hospitalizations for acute decompensated HF presenting for recurrent exacerbation, now with shift of focus to symptom management. Pt speaks english and refuses  services preferring to converse in English.

## 2021-05-02 NOTE — PROGRESS NOTE ADULT - ATTENDING COMMENTS
Patient seen and examined with Palliative care fellow. Agree with assessment and plan as outlined above.

## 2021-05-02 NOTE — PROGRESS NOTE ADULT - PROBLEM SELECTOR PLAN 6
- Most likely due to ischemic/valvular heart disease  -repeat TTE shows moderate pulm HTN  - Monitoring for hypoxemia

## 2021-05-02 NOTE — PROGRESS NOTE ADULT - PROBLEM SELECTOR PLAN 3
- possibly from constipation; s/p fecal disimpaction on 5/2  - Increase to Dilaudid 0.5mg IVP q6 ATC   - c/w Dilaudid 0.5mg IV q1 prn dyspnea  - c/w bowel regimen while patient is on opioids

## 2021-05-03 LAB
GLUCOSE BLDC GLUCOMTR-MCNC: 129 MG/DL — HIGH (ref 70–99)
GLUCOSE BLDC GLUCOMTR-MCNC: 215 MG/DL — HIGH (ref 70–99)
GLUCOSE BLDC GLUCOMTR-MCNC: 364 MG/DL — HIGH (ref 70–99)
GLUCOSE BLDC GLUCOMTR-MCNC: 384 MG/DL — HIGH (ref 70–99)
SARS-COV-2 RNA SPEC QL NAA+PROBE: SIGNIFICANT CHANGE UP

## 2021-05-03 PROCEDURE — 99232 SBSQ HOSP IP/OBS MODERATE 35: CPT | Mod: GC

## 2021-05-03 RX ORDER — HYDRALAZINE HCL 50 MG
35 TABLET ORAL DAILY
Refills: 0 | Status: DISCONTINUED | OUTPATIENT
Start: 2021-05-04 | End: 2021-05-04

## 2021-05-03 RX ADMIN — HYDROMORPHONE HYDROCHLORIDE 0.5 MILLIGRAM(S): 2 INJECTION INTRAMUSCULAR; INTRAVENOUS; SUBCUTANEOUS at 06:38

## 2021-05-03 RX ADMIN — INSULIN GLARGINE 17 UNIT(S): 100 INJECTION, SOLUTION SUBCUTANEOUS at 22:17

## 2021-05-03 RX ADMIN — Medication 5: at 17:19

## 2021-05-03 RX ADMIN — Medication 3: at 22:18

## 2021-05-03 RX ADMIN — HYDROMORPHONE HYDROCHLORIDE 0.5 MILLIGRAM(S): 2 INJECTION INTRAMUSCULAR; INTRAVENOUS; SUBCUTANEOUS at 09:33

## 2021-05-03 RX ADMIN — HYDROMORPHONE HYDROCHLORIDE 0.5 MILLIGRAM(S): 2 INJECTION INTRAMUSCULAR; INTRAVENOUS; SUBCUTANEOUS at 17:19

## 2021-05-03 RX ADMIN — Medication 100 MILLIGRAM(S): at 06:37

## 2021-05-03 RX ADMIN — ESCITALOPRAM OXALATE 5 MILLIGRAM(S): 10 TABLET, FILM COATED ORAL at 12:07

## 2021-05-03 RX ADMIN — SPIRONOLACTONE 12.5 MILLIGRAM(S): 25 TABLET, FILM COATED ORAL at 06:37

## 2021-05-03 RX ADMIN — BUMETANIDE 2 MILLIGRAM(S): 0.25 INJECTION INTRAMUSCULAR; INTRAVENOUS at 06:37

## 2021-05-03 RX ADMIN — Medication 0.5 MILLIGRAM(S): at 06:38

## 2021-05-03 RX ADMIN — ENOXAPARIN SODIUM 30 MILLIGRAM(S): 100 INJECTION SUBCUTANEOUS at 12:07

## 2021-05-03 RX ADMIN — HYDROMORPHONE HYDROCHLORIDE 0.5 MILLIGRAM(S): 2 INJECTION INTRAMUSCULAR; INTRAVENOUS; SUBCUTANEOUS at 23:18

## 2021-05-03 RX ADMIN — CLOPIDOGREL BISULFATE 75 MILLIGRAM(S): 75 TABLET, FILM COATED ORAL at 12:16

## 2021-05-03 RX ADMIN — Medication 650 MILLIGRAM(S): at 12:07

## 2021-05-03 RX ADMIN — HYDROMORPHONE HYDROCHLORIDE 0.5 MILLIGRAM(S): 2 INJECTION INTRAMUSCULAR; INTRAVENOUS; SUBCUTANEOUS at 12:16

## 2021-05-03 RX ADMIN — Medication 0.5 MILLIGRAM(S): at 22:08

## 2021-05-03 RX ADMIN — Medication 50 MICROGRAM(S): at 06:37

## 2021-05-03 RX ADMIN — Medication 10 UNIT(S): at 08:24

## 2021-05-03 RX ADMIN — Medication 81 MILLIGRAM(S): at 12:07

## 2021-05-03 RX ADMIN — HYDROMORPHONE HYDROCHLORIDE 0.5 MILLIGRAM(S): 2 INJECTION INTRAMUSCULAR; INTRAVENOUS; SUBCUTANEOUS at 00:38

## 2021-05-03 RX ADMIN — Medication 0.5 MILLIGRAM(S): at 13:55

## 2021-05-03 RX ADMIN — Medication 2: at 08:25

## 2021-05-03 NOTE — PROGRESS NOTE ADULT - SUBJECTIVE AND OBJECTIVE BOX
GAP TEAM PALLIATIVE CARE UNIT PROGRESS NOTE:      [  ] Patient on hospice program.    INDICATION FOR PALLIATIVE CARE UNIT SERVICES:    INTERVAL HPI/OVERNIGHT EVENTS:    DNR on chart: Yes      Allergies    azithromycin (Hives; Pruritus)    Intolerances    MEDICATIONS  (STANDING):  aspirin enteric coated 81 milliGRAM(s) Oral daily  buMETAnide 2 milliGRAM(s) Oral two times a day  clopidogrel Tablet 75 milliGRAM(s) Oral daily  enoxaparin Injectable 30 milliGRAM(s) SubCutaneous daily  escitalopram 5 milliGRAM(s) Oral daily  hydrALAZINE 35 milliGRAM(s) Oral three times a day  HYDROmorphone  Injectable 0.5 milliGRAM(s) IV Push every 6 hours  insulin glargine Injectable (LANTUS) 17 Unit(s) SubCutaneous at bedtime  insulin lispro (ADMELOG) corrective regimen sliding scale   SubCutaneous three times a day before meals  insulin lispro (ADMELOG) corrective regimen sliding scale   SubCutaneous at bedtime  insulin lispro Injectable (ADMELOG) 10 Unit(s) SubCutaneous three times a day before meals  levothyroxine 50 MICROGram(s) Oral daily  LORazepam   Injectable 0.5 milliGRAM(s) IV Push every 8 hours  metoprolol succinate  milliGRAM(s) Oral daily  sodium bicarbonate 650 milliGRAM(s) Oral daily  spironolactone 12.5 milliGRAM(s) Oral daily    MEDICATIONS  (PRN):  bisacodyl Suppository 10 milliGRAM(s) Rectal daily PRN Constipation  glycopyrrolate Injectable 0.4 milliGRAM(s) IV Push every 6 hours PRN Secretions.  HYDROmorphone  Injectable 0.5 milliGRAM(s) IV Push every 1 hour PRN Moderate to Severe pain  HYDROmorphone  Injectable 0.5 milliGRAM(s) IV Push every 1 hour PRN Dyspnea, labored breathing or RR > 28  lactulose Syrup 10 Gram(s) Oral daily PRN Constipation  LORazepam   Injectable 0.5 milliGRAM(s) IV Push every 1 hour PRN Anxiety    ITEMS UNCHECKED ARE NOT PRESENT    PRESENT SYMPTOMS: [ ]Unable to obtain due to poor mentation   Source if other than patient:  [ ]Family   [ ]Team     Pain: [ ] yes [ ] no  QOL impact -   Location -                    Aggravating factors -  Quality -  Radiation -  Timing-  Severity (0-10 scale):  Minimal acceptable level (0-10 scale):     Dyspnea:                           [ ]Mild [ ]Moderate [ ]Severe  Anxiety:                             [ ]Mild [ ]Moderate [ ]Severe  Fatigue:                             [ ]Mild [ ]Moderate [ ]Severe  Nausea:                             [ ]Mild [ ]Moderate [ ]Severe  Loss of appetite:              [ ]Mild [ ]Moderate [ ]Severe  Constipation:                    [ ]Mild [ ]Moderate [ ]Severe    PAINAD Score:    [email or web address] (Ctrl +  left click to view)  		  Other Symptoms:  [ ]All other review of systems negative     Palliative Performance Status Version 2:         %         http://npcrc.org/files/news/palliative_performance_scale_[email or web address].pdf  PHYSICAL EXAM:  Vital Signs Last 24 Hrs  T(C): 36.5 (03 May 2021 06:50), Max: 36.5 (03 May 2021 06:50)  T(F): 97.7 (03 May 2021 06:50), Max: 97.7 (03 May 2021 06:50)  HR: 75 (03 May 2021 06:50) (69 - 78)  BP: 106/69 (03 May 2021 06:50) (89/52 - 106/69)  BP(mean): --  RR: 20 (03 May 2021 06:50) (18 - 20)  SpO2: 96% (03 May 2021 06:50) (94% - 96%) I&O's Summary  GENERAL:  [ ]Alert  [ ]Oriented x   [ ]Lethargic  [ ]Cachexia  [ ]Unarousable  [ ]Verbal  [ ]Non-Verbal  Behavioral:   [ ] Anxiety  [ ] Delirium [ ] Agitation [ ] Other  HEENT:  [ ]Normal   [ ]Dry mouth   [ ]ET Tube/Trach  [ ]Oral lesions  PULMONARY:   [ ]Clear [ ]Tachypnea  [ ]Audible excessive secretions   [ ]Rhonchi        [ ]Right [ ]Left [ ]Bilateral  [ ]Crackles        [ ]Right [ ]Left [ ]Bilateral  [ ]Wheezing     [ ]Right [ ]Left [ ]Bilateral  [ ]Diminshed BS [ ]Right [ ]Left [ ]Bilateral    CARDIOVASCULAR:    [ ]Regular [ ]Irregular [ ]Tachy  [ ]Ramesh [ ]Murmur [ ]Other  GASTROINTESTINAL:  [ ]Soft  [ ]Distended   [ ]+BS  [ ]Non tender [ ]Tender  [ ]PEG [ ]OGT/ NGT   Last BM:       GENITOURINARY:  [ ]Normal [ ] Incontinent   [ ]Oliguria/Anuria   [ ]Oscar  MUSCULOSKELETAL:   [ ]Normal   [ ]Weakness  [ ]Bed/Wheelchair bound [ ]Edema  NEUROLOGIC:   [ ]No focal deficits  [ ] Cognitive impairment  [ ] Dysphagia [ ]Dysarthria [ ] Paresis [ ]Other   SKIN:   [ ]Normal  [ ]Rash     [ ]Pressure ulcer(s)  [ ]y [ ]n  Present on admission      CRITICAL CARE:  [ ] Shock Present  [ ]Septic [ ]Cardiogenic [ ]Neurologic [ ]Hypovolemic  [ ]  Vasopressors [ ]  Inotropes   [ ] Respiratory failure present [ ] Mechanical Ventilation [ ] Non-invasive ventilatory support [ ] High-Flow  [ ] Acute  [ ] Chronic [ ] Hypoxic  [ ] Hypercarbic [ ] Other  [ ] Other organ failure     LABS:            RADIOLOGY & ADDITIONAL STUDIES:    PROTEIN CALORIE MALNUTRITION: [ ] mild [ ] moderate [ ] severe  [ ] underweight [ ] morbid obesity    https://www.andeal.org/vault/2440/web/files/ONC/Table_Clinical%20Characteristics%20to%20Document%20Malnutrition-White%20JV%20et%20al%177658.pdf    Height (cm): 149.9 (04-16-21 @ 22:31), 149.9 (01-13-21 @ 01:15)  Weight (kg): 55 (04-26-21 @ 07:33), 53 (01-25-21 @ 13:11)  BMI (kg/m2): 24.5 (04-26-21 @ 07:33), 23.6 (04-16-21 @ 22:31), 23.6 (01-25-21 @ 13:11)    [ ] PPSV2 < or = 30% [ ] significant weight loss [ ] poor nutritional intake [ ] anasarca Albumin, Serum: 3.4 g/dL (04-19-21 @ 01:13)    Artificial Nutrition [ ]     REFERRALS:   [ ]Chaplaincy  [ ] Hospice  [ ]Child Life  [ ]Social Work  [ ]Case management [ ]Holistic Therapy [ ] Physical Therapy [ ] Dietary   Goals of Care Document: TAIWO Nicole (04-29-21 @ 14:53)  Goals of Care Conversation:   Participants:  · Participants  Family; Staff  · Child(michelle)  Mohsin  · Provider  Dr. Dr. Ana Nicole    Advance Directives:  · Caregiver:  yes  · Name  Beena Negrete  · Phone Number  [phone]    Conversation Discussion:  · Conversation  Treatment Options  · Conversation Details  Discussed with son, pt's current clinical status including patient not taking all of her oral medications. We discussed the risks and benefits of stopping several of her oral medications that will not provide benefits given her current clinical status, including her statin. Son in agreement. Will continue to touch base with him and provide updates as needed. All questions/concerns addressed.    Personal Advance Directives Treatment Guidelines:   MOLST:  · Completed  20-Apr-2021    Location of Discussion:   Duration of Advanced Care Planning Meeting:  · Time spent (in minutes)  10    Location of Discussion:  · Location of discussion  Telephone      Electronic Signatures:  Kavon Nicole)  (Signed 29-Apr-2021 14:59)  	Authored: Goals of Care Conversation, Personal Advance Directives Treatment Guidelines, Location of Discussion      Last Updated: 29-Apr-2021 14:59 by Kavon Nicole)       GAP TEAM PALLIATIVE CARE UNIT PROGRESS NOTE:      [  ] Patient on hospice program.    INDICATION FOR PALLIATIVE CARE UNIT SERVICES: Symptomatic management of dyspnea and anxiety in setting of end stage heart failure and transition to end of life care.     INTERVAL HPI/OVERNIGHT EVENTS: Required dilaudid 0.5IV PRN x2 for pain, ativan 0.5mg x1 with another PRN dose to be administered now for anxiety. Patient seen and examined at bedside, moaning, not able to verbalize, seemed to be trying to say something but unable to communicate or nod/shake head to convey her wishes. Tachypneic, possible anxiety and/or pain.     DNR on chart: Yes      Allergies    azithromycin (Hives; Pruritus)    Intolerances    MEDICATIONS  (STANDING):  aspirin enteric coated 81 milliGRAM(s) Oral daily  buMETAnide 2 milliGRAM(s) Oral two times a day  clopidogrel Tablet 75 milliGRAM(s) Oral daily  enoxaparin Injectable 30 milliGRAM(s) SubCutaneous daily  escitalopram 5 milliGRAM(s) Oral daily  hydrALAZINE 35 milliGRAM(s) Oral three times a day  HYDROmorphone  Injectable 0.5 milliGRAM(s) IV Push every 6 hours  insulin glargine Injectable (LANTUS) 17 Unit(s) SubCutaneous at bedtime  insulin lispro (ADMELOG) corrective regimen sliding scale   SubCutaneous three times a day before meals  insulin lispro (ADMELOG) corrective regimen sliding scale   SubCutaneous at bedtime  insulin lispro Injectable (ADMELOG) 10 Unit(s) SubCutaneous three times a day before meals  levothyroxine 50 MICROGram(s) Oral daily  LORazepam   Injectable 0.5 milliGRAM(s) IV Push every 8 hours  metoprolol succinate  milliGRAM(s) Oral daily  sodium bicarbonate 650 milliGRAM(s) Oral daily  spironolactone 12.5 milliGRAM(s) Oral daily    MEDICATIONS  (PRN):  bisacodyl Suppository 10 milliGRAM(s) Rectal daily PRN Constipation  glycopyrrolate Injectable 0.4 milliGRAM(s) IV Push every 6 hours PRN Secretions.  HYDROmorphone  Injectable 0.5 milliGRAM(s) IV Push every 1 hour PRN Moderate to Severe pain  HYDROmorphone  Injectable 0.5 milliGRAM(s) IV Push every 1 hour PRN Dyspnea, labored breathing or RR > 28  lactulose Syrup 10 Gram(s) Oral daily PRN Constipation  LORazepam   Injectable 0.5 milliGRAM(s) IV Push every 1 hour PRN Anxiety    ITEMS UNCHECKED ARE NOT PRESENT    PRESENT SYMPTOMS: [x]Unable to obtain due to poor mentation   Source if other than patient:  [ ]Family   [ ]Team     Pain: [x] yes [ ] no  QOL impact -   Location -                    Aggravating factors -  Quality -  Radiation -  Timing-  Severity (0-10 scale):  Minimal acceptable level (0-10 scale):     Dyspnea:                           [x]Mild [ ]Moderate [ ]Severe  Anxiety:                             [ ]Mild [x]Moderate [ ]Severe  Fatigue:                             [ ]Mild [ ]Moderate [ ]Severe  Nausea:                             [ ]Mild [ ]Moderate [ ]Severe  Loss of appetite:              [ ]Mild [ ]Moderate [ ]Severe  Constipation:                    [ ]Mild [ ]Moderate [ ]Severe    PAINAD Score: 0-8 in last 24 hours    [email or web address] (Ctrl +  left click to view)  		  Other Symptoms:  [x]All other review of systems negative     Palliative Performance Status Version 2:         %         http://npcrc.org/files/news/palliative_performance_scale_ppsv2.pdf  PHYSICAL EXAM:  Vital Signs Last 24 Hrs  T(C): 36.5 (03 May 2021 06:50), Max: 36.5 (03 May 2021 06:50)  T(F): 97.7 (03 May 2021 06:50), Max: 97.7 (03 May 2021 06:50)  HR: 75 (03 May 2021 06:50) (69 - 78)  BP: 106/69 (03 May 2021 06:50) (89/52 - 106/69)  BP(mean): --  RR: 20 (03 May 2021 06:50) (18 - 20)  SpO2: 96% (03 May 2021 06:50) (94% - 96%) I&O's Summary  GENERAL:  [x]Alert  [ ]Oriented x   [ ]Lethargic  [ ]Cachexia  [ ]Unarousable  [x]Verbal: moans  [ ]Non-Verbal  Behavioral:   [x] Anxiety  [ ] Delirium [ ] Agitation [ ] Other  HEENT:  [x]Normal   [ ]Dry mouth   [ ]ET Tube/Trach  [ ]Oral lesions  PULMONARY:   [ ]Clear [ ]Tachypnea  [ ]Audible excessive secretions   [ ]Rhonchi        [ ]Right [ ]Left [ ]Bilateral  [ ]Crackles        [ ]Right [ ]Left [ ]Bilateral  [ ]Wheezing     [ ]Right [ ]Left [ ]Bilateral  [x]Diminshed BS [ ]Right [ ]Left [x]Bilateral    CARDIOVASCULAR:    [x]Regular [ ]Irregular [ ]Tachy  [ ]Ramesh [ ]Murmur [ ]Other  GASTROINTESTINAL:  [x]Soft  [ ]Distended   [x]+BS  [ ]Non tender [ ]Tender  [ ]PEG [ ]OGT/ NGT   Last BM: 5/3      GENITOURINARY:  [ ]Normal [x] Incontinent   [ ]Oliguria/Anuria   [ ]Oscar  MUSCULOSKELETAL:   [ ]Normal   [x]Weakness  [ ]Bed/Wheelchair bound [ ]Edema  NEUROLOGIC:   [x]No focal deficits  [ ] Cognitive impairment  [ ] Dysphagia [ ]Dysarthria [ ] Paresis [ ]Other   SKIN:   [ ]Normal  [ ]Rash     [ ]Pressure ulcer(s)  [ ]y [ ]n  Present on admission      CRITICAL CARE:  [ ] Shock Present  [ ]Septic [ ]Cardiogenic [ ]Neurologic [ ]Hypovolemic  [ ]  Vasopressors [ ]  Inotropes   [ ] Respiratory failure present [ ] Mechanical Ventilation [ ] Non-invasive ventilatory support [ ] High-Flow  [ ] Acute  [ ] Chronic [ ] Hypoxic  [ ] Hypercarbic [ ] Other  [ ] Other organ failure     LABS:            RADIOLOGY & ADDITIONAL STUDIES:    PROTEIN CALORIE MALNUTRITION: [ ] mild [ ] moderate [ ] severe  [ ] underweight [ ] morbid obesity    https://www.andeal.org/vault/2330/web/files/ONC/Table_Clinical%20Characteristics%20to%20Document%20Malnutrition-White%20JV%20et%20al%202012.pdf    Height (cm): 149.9 (04-16-21 @ 22:31), 149.9 (01-13-21 @ 01:15)  Weight (kg): 55 (04-26-21 @ 07:33), 53 (01-25-21 @ 13:11)  BMI (kg/m2): 24.5 (04-26-21 @ 07:33), 23.6 (04-16-21 @ 22:31), 23.6 (01-25-21 @ 13:11)    [ ] PPSV2 < or = 30% [ ] significant weight loss [ ] poor nutritional intake [ ] anasarca Albumin, Serum: 3.4 g/dL (04-19-21 @ 01:13)    Artificial Nutrition [ ]     REFERRALS:   [ ]Chaplaincy  [ ] Hospice  [ ]Child Life  [ ]Social Work  [ ]Case management [ ]Holistic Therapy [ ] Physical Therapy [ ] Dietary   Goals of Care Document: TAIWO Nicole (04-29-21 @ 14:53)  Goals of Care Conversation:   Participants:  · Participants  Family; Staff  · Child(ren) Mohsin  · Provider  Dr. Nicole, Dr. Perez    Advance Directives:  · Caregiver:  yes  · Name  Beena Negrete  · Phone Number  [phone]    Conversation Discussion:  · Conversation  Treatment Options  · Conversation Details  Discussed with son, pt's current clinical status including patient not taking all of her oral medications. We discussed the risks and benefits of stopping several of her oral medications that will not provide benefits given her current clinical status, including her statin. Son in agreement. Will continue to touch base with him and provide updates as needed. All questions/concerns addressed.    Personal Advance Directives Treatment Guidelines:   MOLST:  · Completed  20-Apr-2021    Location of Discussion:   Duration of Advanced Care Planning Meeting:  · Time spent (in minutes)  10    Location of Discussion:  · Location of discussion  Telephone      Electronic Signatures:  Kavon Nicole)  (Signed 29-Apr-2021 14:59)  	Authored: Goals of Care Conversation, Personal Advance Directives Treatment Guidelines, Location of Discussion      Last Updated: 29-Apr-2021 14:59 by Kavon Nicole)       GAP TEAM PALLIATIVE CARE UNIT PROGRESS NOTE:      [  ] Patient on hospice program.    INDICATION FOR PALLIATIVE CARE UNIT SERVICES: Symptomatic management of dyspnea and anxiety in setting of end stage heart failure and transition to end of life care.     INTERVAL HPI/OVERNIGHT EVENTS: Required dilaudid 0.5IV PRN x2 for pain, ativan 0.5mg x1 with another PRN dose to be administered now for anxiety. Patient seen and examined at bedside, moaning, not able to verbalize, seemed to be trying to say something but unable to communicate or nod/shake head to convey her wishes. Tachypneic, possible anxiety and/or pain.     DNR on chart: Yes      Allergies    azithromycin (Hives; Pruritus)    Intolerances    MEDICATIONS  (STANDING):  aspirin enteric coated 81 milliGRAM(s) Oral daily  buMETAnide 2 milliGRAM(s) Oral two times a day  clopidogrel Tablet 75 milliGRAM(s) Oral daily  enoxaparin Injectable 30 milliGRAM(s) SubCutaneous daily  escitalopram 5 milliGRAM(s) Oral daily  hydrALAZINE 35 milliGRAM(s) Oral three times a day  HYDROmorphone  Injectable 0.5 milliGRAM(s) IV Push every 6 hours  insulin glargine Injectable (LANTUS) 17 Unit(s) SubCutaneous at bedtime  insulin lispro (ADMELOG) corrective regimen sliding scale   SubCutaneous three times a day before meals  insulin lispro (ADMELOG) corrective regimen sliding scale   SubCutaneous at bedtime  insulin lispro Injectable (ADMELOG) 10 Unit(s) SubCutaneous three times a day before meals  levothyroxine 50 MICROGram(s) Oral daily  LORazepam   Injectable 0.5 milliGRAM(s) IV Push every 8 hours  metoprolol succinate  milliGRAM(s) Oral daily  sodium bicarbonate 650 milliGRAM(s) Oral daily  spironolactone 12.5 milliGRAM(s) Oral daily    MEDICATIONS  (PRN):  bisacodyl Suppository 10 milliGRAM(s) Rectal daily PRN Constipation  glycopyrrolate Injectable 0.4 milliGRAM(s) IV Push every 6 hours PRN Secretions.  HYDROmorphone  Injectable 0.5 milliGRAM(s) IV Push every 1 hour PRN Moderate to Severe pain  HYDROmorphone  Injectable 0.5 milliGRAM(s) IV Push every 1 hour PRN Dyspnea, labored breathing or RR > 28  lactulose Syrup 10 Gram(s) Oral daily PRN Constipation  LORazepam   Injectable 0.5 milliGRAM(s) IV Push every 1 hour PRN Anxiety    ITEMS UNCHECKED ARE NOT PRESENT    PRESENT SYMPTOMS: [x]Unable to obtain due to poor mentation   Source if other than patient:  [ ]Family   [ ]Team     Pain: [x] yes [ ] no  see painads score  QOL impact -   Location -                    Aggravating factors -  Quality -  Radiation -  Timing-  Severity (0-10 scale):  Minimal acceptable level (0-10 scale):     Dyspnea:                           [x]Mild [ ]Moderate [ ]Severe  Anxiety:                             [ ]Mild [x]Moderate [ ]Severe  Fatigue:                             [ ]Mild [ ]Moderate [ ]Severe  Nausea:                             [ ]Mild [ ]Moderate [ ]Severe  Loss of appetite:              [ ]Mild [ ]Moderate [ ]Severe  Constipation:                    [ ]Mild [ ]Moderate [ ]Severe    PAINAD Score: 0-8 in last 24 hours    [email or web address] (Ctrl +  left click to view)  		  Other Symptoms:  [x]All other review of systems negative     Palliative Performance Status Version 2:   30      %         http://npcrc.org/files/news/palliative_performance_scale_ppsv2.pdf  PHYSICAL EXAM:  Vital Signs Last 24 Hrs  T(C): 36.5 (03 May 2021 06:50), Max: 36.5 (03 May 2021 06:50)  T(F): 97.7 (03 May 2021 06:50), Max: 97.7 (03 May 2021 06:50)  HR: 75 (03 May 2021 06:50) (69 - 78)  BP: 106/69 (03 May 2021 06:50) (89/52 - 106/69)  BP(mean): --  RR: 20 (03 May 2021 06:50) (18 - 20)  SpO2: 96% (03 May 2021 06:50) (94% - 96%) I&O's Summary  GENERAL:  [x]Alert  [ ]Oriented x   [ ]Lethargic  [ ]Cachexia  [ ]Unarousable  [x]Verbal: moans  [ ]Non-Verbal  Behavioral:   [x] Anxiety  [ ] Delirium [ ] Agitation [ ] Other  HEENT:  [x]Normal   [ ]Dry mouth   [ ]ET Tube/Trach  [ ]Oral lesions  PULMONARY:   [ ]Clear [ ]Tachypnea  [ ]Audible excessive secretions   [ ]Rhonchi        [ ]Right [ ]Left [ ]Bilateral  [ ]Crackles        [ ]Right [ ]Left [ ]Bilateral  [ ]Wheezing     [ ]Right [ ]Left [ ]Bilateral  [x]Diminished BS [ ]Right [ ]Left [x]Bilateral    CARDIOVASCULAR:    [x]Regular [ ]Irregular [ ]Tachy  [ ]Ramesh [ ]Murmur [ ]Other  GASTROINTESTINAL:  [x]Soft  [ ]Distended   [x]+BS  [ ]Non tender [ ]Tender  [ ]PEG [ ]OGT/ NGT   Last BM: 5/3      GENITOURINARY:  [ ]Normal [x] Incontinent   [ ]Oliguria/Anuria   [ ]Oscar  MUSCULOSKELETAL:   [ ]Normal   [x]Weakness  [ ]Bed/Wheelchair bound [ ]Edema  NEUROLOGIC:   [x]No focal deficits  [x ] Cognitive impairment appears confused. [ ] Dysphagia [ ]Dysarthria [ ] Paresis [ ]Other   SKIN: scattered bruising  [x ]Normal  [ ]Rash     [ ]Pressure ulcer(s)  [ ]y [ ]n  Present on admission      CRITICAL CARE:  [ ] Shock Present  [ ]Septic [ ]Cardiogenic [ ]Neurologic [ ]Hypovolemic  [ ]  Vasopressors [ ]  Inotropes   [ ] Respiratory failure present [ ] Mechanical Ventilation [ ] Non-invasive ventilatory support [ ] High-Flow  [ ] Acute  [ ] Chronic [ ] Hypoxic  [ ] Hypercarbic [ ] Other  [ ] Other organ failure     LABS:  None new.           RADIOLOGY & ADDITIONAL STUDIES: None new.     PROTEIN CALORIE MALNUTRITION: [ ] mild [ ] moderate [ ] severe  [ ] underweight [ ] morbid obesity    https://www.andeal.org/vault/5300/web/files/ONC/Table_Clinical%20Characteristics%20to%20Document%20Malnutrition-White%20JV%20et%20al%202012.pdf    Height (cm): 149.9 (04-16-21 @ 22:31), 149.9 (01-13-21 @ 01:15)  Weight (kg): 55 (04-26-21 @ 07:33), 53 (01-25-21 @ 13:11)  BMI (kg/m2): 24.5 (04-26-21 @ 07:33), 23.6 (04-16-21 @ 22:31), 23.6 (01-25-21 @ 13:11)    [x ] PPSV2 < or = 30% [ ] significant weight loss [x ] poor nutritional intake [ ] anasarca Albumin, Serum: 3.4 g/dL (04-19-21 @ 01:13)    Artificial Nutrition [ ]     REFERRALS:   [ ]Chaplaincy  [ ] Hospice  [ ]Child Life  [x ]Social Work  [x ]Case management [ ]Holistic Therapy [ ] Physical Therapy [ ] Dietary     Goals of Care Document: TAIWO Nicole (04-29-21 @ 14:53)  Goals of Care Conversation:   Participants:  · Participants  Family; Staff  · Child(michelle)  Mohsin  · Provider  Dr. Nicole, Dr. Perez    Advance Directives:  · Caregiver:  yes  · Name  Beena Negrete  · Phone Number  [phone]    Conversation Discussion:  · Conversation  Treatment Options  · Conversation Details  Discussed with son, pt's current clinical status including patient not taking all of her oral medications. We discussed the risks and benefits of stopping several of her oral medications that will not provide benefits given her current clinical status, including her statin. Son in agreement. Will continue to touch base with him and provide updates as needed. All questions/concerns addressed.    Personal Advance Directives Treatment Guidelines:   MOLST:  · Completed  20-Apr-2021    Location of Discussion:   Duration of Advanced Care Planning Meeting:  · Time spent (in minutes)  10    Location of Discussion:  · Location of discussion  Telephone      Electronic Signatures:  Kavon Nicole)  (Signed 29-Apr-2021 14:59)  	Authored: Goals of Care Conversation, Personal Advance Directives Treatment Guidelines, Location of Discussion      Last Updated: 29-Apr-2021 14:59 by Kavon Nicole)

## 2021-05-03 NOTE — PROGRESS NOTE ADULT - ASSESSMENT
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 Pt speaks english and refuses  services preferring to converse in English.

## 2021-05-03 NOTE — PROGRESS NOTE ADULT - PROBLEM SELECTOR PLAN 4
- c/w Bumex 2mg PO BID, spironolactone 12.5mg PO daily, hydralazine 35mg PO TID, Toprol 100mg QD; continue medications with hold parameters

## 2021-05-03 NOTE — PROGRESS NOTE ADULT - PROBLEM SELECTOR PLAN 3
- possibly from constipation; s/p fecal disimpaction on 5/2  - c/w Dilaudid 0.5mg IVP q6 ATC   - c/w Dilaudid 0.5mg IV q1 prn dyspnea  - c/w bowel regimen while patient is on opioids

## 2021-05-03 NOTE — PROGRESS NOTE ADULT - PROBLEM SELECTOR PLAN 8
- Spoke with daughter Elsa at bedside. Discussed that patient has been declining the past few days; daughter agrees and is aware. Elsa verbalizes that her goal is for patient to be comfortable; discussed adjusting changes in medicaiton regimen to help patient to be comfortable. Emotional support provided to daughter.   - Will continue with symptom management in the PCU  -Dispo: pending auth to [facility] - Spoke with daughter Elsa at bedside.  Reaffirmed goal of symptom directed care to keep patient comfortable.    -Dispo: pending insurance authorization for [facility].  Patient accepted by the facility.

## 2021-05-03 NOTE — PROGRESS NOTE ADULT - PROBLEM SELECTOR PLAN 1
- Switch to IV Ativan 0.5mg q8 ATC  - c/w IV Ativan 0.5 mg q1hr PRN, required 2 in last 24 hours - Switched to IV Ativan 0.5mg q8 ATC over the weekend  - c/w IV Ativan 0.5 mg q1hr PRN, required 2 in last 24 hours

## 2021-05-04 ENCOUNTER — TRANSCRIPTION ENCOUNTER (OUTPATIENT)
Age: 76
End: 2021-05-04

## 2021-05-04 VITALS
OXYGEN SATURATION: 98 % | SYSTOLIC BLOOD PRESSURE: 132 MMHG | RESPIRATION RATE: 20 BRPM | DIASTOLIC BLOOD PRESSURE: 74 MMHG | HEART RATE: 93 BPM | TEMPERATURE: 98 F

## 2021-05-04 LAB
GLUCOSE BLDC GLUCOMTR-MCNC: 242 MG/DL — HIGH (ref 70–99)
GLUCOSE BLDC GLUCOMTR-MCNC: 377 MG/DL — HIGH (ref 70–99)

## 2021-05-04 PROCEDURE — 99239 HOSP IP/OBS DSCHRG MGMT >30: CPT

## 2021-05-04 PROCEDURE — 84100 ASSAY OF PHOSPHORUS: CPT

## 2021-05-04 PROCEDURE — 84132 ASSAY OF SERUM POTASSIUM: CPT

## 2021-05-04 PROCEDURE — 82803 BLOOD GASES ANY COMBINATION: CPT

## 2021-05-04 PROCEDURE — 80053 COMPREHEN METABOLIC PANEL: CPT

## 2021-05-04 PROCEDURE — 83735 ASSAY OF MAGNESIUM: CPT

## 2021-05-04 PROCEDURE — 83605 ASSAY OF LACTIC ACID: CPT

## 2021-05-04 PROCEDURE — 87635 SARS-COV-2 COVID-19 AMP PRB: CPT

## 2021-05-04 PROCEDURE — 86769 SARS-COV-2 COVID-19 ANTIBODY: CPT

## 2021-05-04 PROCEDURE — 97162 PT EVAL MOD COMPLEX 30 MIN: CPT

## 2021-05-04 PROCEDURE — 84484 ASSAY OF TROPONIN QUANT: CPT

## 2021-05-04 PROCEDURE — 97110 THERAPEUTIC EXERCISES: CPT

## 2021-05-04 PROCEDURE — 99285 EMERGENCY DEPT VISIT HI MDM: CPT

## 2021-05-04 PROCEDURE — C8929: CPT

## 2021-05-04 PROCEDURE — 71045 X-RAY EXAM CHEST 1 VIEW: CPT

## 2021-05-04 PROCEDURE — 84295 ASSAY OF SERUM SODIUM: CPT

## 2021-05-04 PROCEDURE — 80048 BASIC METABOLIC PNL TOTAL CA: CPT

## 2021-05-04 PROCEDURE — 85025 COMPLETE CBC W/AUTO DIFF WBC: CPT

## 2021-05-04 PROCEDURE — 85014 HEMATOCRIT: CPT

## 2021-05-04 PROCEDURE — 85610 PROTHROMBIN TIME: CPT

## 2021-05-04 PROCEDURE — U0005: CPT

## 2021-05-04 PROCEDURE — 83036 HEMOGLOBIN GLYCOSYLATED A1C: CPT

## 2021-05-04 PROCEDURE — 83880 ASSAY OF NATRIURETIC PEPTIDE: CPT

## 2021-05-04 PROCEDURE — 85730 THROMBOPLASTIN TIME PARTIAL: CPT

## 2021-05-04 PROCEDURE — 97112 NEUROMUSCULAR REEDUCATION: CPT

## 2021-05-04 PROCEDURE — 82435 ASSAY OF BLOOD CHLORIDE: CPT

## 2021-05-04 PROCEDURE — 96374 THER/PROPH/DIAG INJ IV PUSH: CPT

## 2021-05-04 PROCEDURE — 82330 ASSAY OF CALCIUM: CPT

## 2021-05-04 PROCEDURE — 85018 HEMOGLOBIN: CPT

## 2021-05-04 PROCEDURE — 82962 GLUCOSE BLOOD TEST: CPT

## 2021-05-04 PROCEDURE — 82947 ASSAY GLUCOSE BLOOD QUANT: CPT

## 2021-05-04 PROCEDURE — 93005 ELECTROCARDIOGRAM TRACING: CPT

## 2021-05-04 PROCEDURE — 84443 ASSAY THYROID STIM HORMONE: CPT

## 2021-05-04 PROCEDURE — U0003: CPT

## 2021-05-04 PROCEDURE — 85027 COMPLETE CBC AUTOMATED: CPT

## 2021-05-04 PROCEDURE — 94660 CPAP INITIATION&MGMT: CPT

## 2021-05-04 RX ORDER — HYDROMORPHONE HYDROCHLORIDE 2 MG/ML
0.5 INJECTION INTRAMUSCULAR; INTRAVENOUS; SUBCUTANEOUS
Refills: 0 | Status: DISCONTINUED | OUTPATIENT
Start: 2021-05-04 | End: 2021-05-04

## 2021-05-04 RX ORDER — METOPROLOL TARTRATE 50 MG
1 TABLET ORAL
Qty: 0 | Refills: 0 | DISCHARGE
Start: 2021-05-04

## 2021-05-04 RX ORDER — CLOPIDOGREL BISULFATE 75 MG/1
1 TABLET, FILM COATED ORAL
Qty: 0 | Refills: 0 | DISCHARGE
Start: 2021-05-04

## 2021-05-04 RX ORDER — HYDROMORPHONE HYDROCHLORIDE 2 MG/ML
0.5 INJECTION INTRAMUSCULAR; INTRAVENOUS; SUBCUTANEOUS
Qty: 0 | Refills: 0 | DISCHARGE
Start: 2021-05-04

## 2021-05-04 RX ORDER — ASPIRIN/CALCIUM CARB/MAGNESIUM 324 MG
1 TABLET ORAL
Qty: 0 | Refills: 0 | DISCHARGE
Start: 2021-05-04

## 2021-05-04 RX ORDER — LACTULOSE 10 G/15ML
15 SOLUTION ORAL
Qty: 0 | Refills: 0 | DISCHARGE
Start: 2021-05-04

## 2021-05-04 RX ORDER — LEVOTHYROXINE SODIUM 125 MCG
1 TABLET ORAL
Qty: 0 | Refills: 0 | DISCHARGE
Start: 2021-05-04

## 2021-05-04 RX ORDER — SODIUM BICARBONATE 1 MEQ/ML
1 SYRINGE (ML) INTRAVENOUS
Qty: 0 | Refills: 0 | DISCHARGE
Start: 2021-05-04

## 2021-05-04 RX ORDER — INSULIN GLARGINE 100 [IU]/ML
17 INJECTION, SOLUTION SUBCUTANEOUS
Qty: 0 | Refills: 0 | DISCHARGE
Start: 2021-05-04

## 2021-05-04 RX ORDER — ESCITALOPRAM OXALATE 10 MG/1
1 TABLET, FILM COATED ORAL
Qty: 0 | Refills: 0 | DISCHARGE
Start: 2021-05-04

## 2021-05-04 RX ORDER — SPIRONOLACTONE 25 MG/1
0.5 TABLET, FILM COATED ORAL
Qty: 0 | Refills: 0 | DISCHARGE
Start: 2021-05-04

## 2021-05-04 RX ORDER — BUMETANIDE 0.25 MG/ML
1 INJECTION INTRAMUSCULAR; INTRAVENOUS
Qty: 0 | Refills: 0 | DISCHARGE
Start: 2021-05-04

## 2021-05-04 RX ADMIN — Medication 5: at 08:29

## 2021-05-04 RX ADMIN — HYDROMORPHONE HYDROCHLORIDE 0.5 MILLIGRAM(S): 2 INJECTION INTRAMUSCULAR; INTRAVENOUS; SUBCUTANEOUS at 11:32

## 2021-05-04 RX ADMIN — HYDROMORPHONE HYDROCHLORIDE 0.5 MILLIGRAM(S): 2 INJECTION INTRAMUSCULAR; INTRAVENOUS; SUBCUTANEOUS at 05:19

## 2021-05-04 RX ADMIN — ENOXAPARIN SODIUM 30 MILLIGRAM(S): 100 INJECTION SUBCUTANEOUS at 11:33

## 2021-05-04 RX ADMIN — Medication 2: at 14:44

## 2021-05-04 RX ADMIN — Medication 0.5 MILLIGRAM(S): at 03:31

## 2021-05-04 RX ADMIN — Medication 0.5 MILLIGRAM(S): at 05:20

## 2021-05-04 RX ADMIN — Medication 0.5 MILLIGRAM(S): at 14:52

## 2021-05-04 RX ADMIN — HYDROMORPHONE HYDROCHLORIDE 0.5 MILLIGRAM(S): 2 INJECTION INTRAMUSCULAR; INTRAVENOUS; SUBCUTANEOUS at 17:31

## 2021-05-04 NOTE — CHART NOTE - NSCHARTNOTEFT_GEN_A_CORE
Nutrition Follow Up     As per RN, pt inappropriate for nutrition follow up at this time.     RD remains available.  Teena Huitron MS RD CDN [facility],  #[phone]

## 2021-05-04 NOTE — PROGRESS NOTE ADULT - ATTENDING COMMENTS
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   Heart failure medically managed.  Patient is stable for discharge with hospice care.  Patient discharged to [facility] today, likely to transition to hospice once there.

## 2021-05-04 NOTE — PROGRESS NOTE ADULT - PROBLEM SELECTOR PLAN 8
- Spoke with daughter Elsa at bedside.  Reaffirmed goal of symptom directed care to keep patient comfortable.    -Dispo: likely discharge to [facility]

## 2021-05-04 NOTE — DISCHARGE NOTE NURSING/CASE MANAGEMENT/SOCIAL WORK - PATIENT PORTAL LINK FT
You can access the FollowMyHealth Patient Portal offered by [facility] by registering at the following website: [email or web address]. By joining [facility]’s FollowMyHealth portal, you will also be able to view your health information using other applications (apps) compatible with our system.

## 2021-05-04 NOTE — PROGRESS NOTE ADULT - PROBLEM SELECTOR PROBLEM 5
CKD (chronic kidney disease), stage IV
CKD (chronic kidney disease), stage IV
Functional quadriplegia
CKD (chronic kidney disease), stage IV
Hypothyroid
CKD (chronic kidney disease), stage IV
Hypothyroid
CKD (chronic kidney disease), stage IV
Hypothyroid
Hypothyroid
Diabetes mellitus
CKD (chronic kidney disease), stage IV
Hypothyroid
CKD (chronic kidney disease), stage IV
Hypothyroid
CKD (chronic kidney disease), stage IV
Hypothyroid
Diabetes mellitus
CKD (chronic kidney disease), stage IV

## 2021-05-04 NOTE — PROGRESS NOTE ADULT - PROBLEM SELECTOR PROBLEM 7
Type 2 diabetes mellitus with stage 4 chronic kidney disease, with long-term current use of insulin
Advanced care planning/counseling discussion
Palliative care encounter
Type 2 diabetes mellitus with stage 4 chronic kidney disease, with long-term current use of insulin
Advanced care planning/counseling discussion
Discharge planning issues
Type 2 diabetes mellitus with stage 4 chronic kidney disease, with long-term current use of insulin
Discharge planning issues
Type 2 diabetes mellitus with stage 4 chronic kidney disease, with long-term current use of insulin
Type 2 diabetes mellitus with stage 4 chronic kidney disease, with long-term current use of insulin

## 2021-05-04 NOTE — PROGRESS NOTE ADULT - ASSESSMENT
75 Romansh speaking F PMH IDDM, CAD s/p CABG 2014 w prior PCI, severe mitral regurg (s/p mitral clip '19), pHTN, HFrEF (21%), CKDIV not on  HD with frequent hospitalizations for acute decompensated HF presenting for recurrent exacerbation, now with shift of focus to symptom management. Pt speaks english and refuses  services preferring to converse in English.

## 2021-05-04 NOTE — PROGRESS NOTE ADULT - PROBLEM SELECTOR PROBLEM 8
Palliative care encounter

## 2021-05-04 NOTE — PROGRESS NOTE ADULT - PROBLEM SELECTOR PROBLEM 6
Pulmonary hypertension
Prophylactic measure
Pulmonary hypertension
Pulmonary hypertension
Prophylactic measure
Functional quadriplegia
Hypothyroid
Pulmonary hypertension
Pulmonary hypertension
Advanced care planning/counseling discussion
Hypothyroid
Pulmonary hypertension
Pulmonary hypertension
Prophylactic measure
Functional quadriplegia

## 2021-05-04 NOTE — PROGRESS NOTE ADULT - NSICDXPILOT_GEN_ALL_CORE
[place]

## 2021-05-04 NOTE — PROGRESS NOTE ADULT - PROVIDER SPECIALTY LIST ADULT
Cardiology
Nephrology
Cardiology
Cardiology
Nephrology
Nephrology
Palliative Care
Cardiology
Nephrology
Cardiology
Cardiology
Internal Medicine
Palliative Care
Internal Medicine
Nephrology
Nephrology
Internal Medicine
Internal Medicine
Palliative Care
Nephrology
Palliative Care
Internal Medicine
Palliative Care
Internal Medicine
Internal Medicine
Palliative Care

## 2021-05-04 NOTE — PROGRESS NOTE ADULT - PROBLEM SELECTOR PLAN 4
- c/w Bumex 2mg PO BID, spironolactone 12.5mg PO daily, hydralazine 35mg PO TID, Toprol 100mg QD; continue medications with hold parameters  -Patient has been unable to tolerate PO for the last day or so despite pills being crushed. Will continue to offer meds but can be re-evaluated regularly based on patient's mental status and ability to take PO.

## 2021-05-04 NOTE — PROGRESS NOTE ADULT - PROBLEM SELECTOR PLAN 1
- Switched to IV Ativan 0.5mg q8 ATC over the weekend  - c/w IV Ativan 0.5 mg q1hr PRN, required 1 in last 24 hours

## 2021-05-04 NOTE — PROGRESS NOTE ADULT - PROBLEM SELECTOR PROBLEM 1
Heart failure
Heart failure
Anxiety
Heart failure
Anxiety
Heart failure
Heart failure
Anxiety
Biventricular heart failure with reduced left ventricular function
Dyspnea
Heart failure
Heart failure
Anxiety
Dyspnea
Anxiety

## 2021-05-04 NOTE — PROGRESS NOTE ADULT - PROBLEM SELECTOR PLAN 2
- c/w  Dilaudid 0.5mg IVP q6 ATC   - c/w Dilaudid 0.5mg IV q1 prn dyspnea, required x0 in last 24 hours  - c/w bowel regimen while patient is on opioids, last BM yesterday

## 2021-05-04 NOTE — PROGRESS NOTE ADULT - PROBLEM SELECTOR PLAN 7
- continue with lantus   - stopped pre-meals on 5/3 as patient taking limited PO  - continue with lispro + sliding scale   - monitor FS

## 2021-05-04 NOTE — PROGRESS NOTE ADULT - ATTENDING SUPERVISION STATEMENT
ACP
Resident
Fellow
Fellow
Resident
ACP
Resident
Resident

## 2021-05-04 NOTE — PROGRESS NOTE ADULT - PROBLEM SELECTOR PROBLEM 3
Biventricular heart failure with reduced left ventricular function
Pulmonary hypertension
Chronic kidney disease
Pulmonary hypertension
Pain
Pain
Pulmonary hypertension
Pulmonary hypertension
Pain
Pain
Pulmonary hypertension
Pulmonary hypertension
Acute encephalopathy
Pain
Pulmonary hypertension
Chronic kidney disease
Pain
Biventricular heart failure with reduced left ventricular function
Pain

## 2021-05-04 NOTE — PROGRESS NOTE ADULT - SUBJECTIVE AND OBJECTIVE BOX
GAP TEAM PALLIATIVE CARE UNIT PROGRESS NOTE:      [  ] Patient on hospice program.    INDICATION FOR PALLIATIVE CARE UNIT SERVICES: Symptomatic management of dyspnea and anxiety in setting of end stage heart failure and transition to end of life care.     INTERVAL HPI/OVERNIGHT EVENTS: Unable to take PO medications, seen this AM. Lethargic, non verbal but arousable/alert.   Required ativan 0.5mg x1 PRN yesterday in late AM for anxiety.     DNR on chart: Yes      Allergies    azithromycin (Hives; Pruritus)    Intolerances    MEDICATIONS  (STANDING):  aspirin enteric coated 81 milliGRAM(s) Oral daily  buMETAnide 2 milliGRAM(s) Oral two times a day  clopidogrel Tablet 75 milliGRAM(s) Oral daily  enoxaparin Injectable 30 milliGRAM(s) SubCutaneous daily  escitalopram 5 milliGRAM(s) Oral daily  hydrALAZINE 35 milliGRAM(s) Oral daily  HYDROmorphone  Injectable 0.5 milliGRAM(s) IV Push every 6 hours  insulin glargine Injectable (LANTUS) 17 Unit(s) SubCutaneous at bedtime  insulin lispro (ADMELOG) corrective regimen sliding scale   SubCutaneous three times a day before meals  insulin lispro (ADMELOG) corrective regimen sliding scale   SubCutaneous at bedtime  levothyroxine 50 MICROGram(s) Oral daily  LORazepam   Injectable 0.5 milliGRAM(s) IV Push every 8 hours  metoprolol succinate  milliGRAM(s) Oral daily  sodium bicarbonate 650 milliGRAM(s) Oral daily  spironolactone 12.5 milliGRAM(s) Oral daily    MEDICATIONS  (PRN):  bisacodyl Suppository 10 milliGRAM(s) Rectal daily PRN Constipation  glycopyrrolate Injectable 0.4 milliGRAM(s) IV Push every 6 hours PRN Secretions.  HYDROmorphone  Injectable 0.5 milliGRAM(s) IV Push every 1 hour PRN Moderate to Severe pain  HYDROmorphone  Injectable 0.5 milliGRAM(s) IV Push every 1 hour PRN Dyspnea, labored breathing or RR > 28  lactulose Syrup 10 Gram(s) Oral daily PRN Constipation  LORazepam   Injectable 0.5 milliGRAM(s) IV Push every 1 hour PRN Anxiety    ITEMS UNCHECKED ARE NOT PRESENT    PRESENT SYMPTOMS: [ ]Unable to obtain due to poor mentation   Source if other than patient:  [ ]Family   [ ]Team     Pain: [ ] yes [ ] no  QOL impact -   Location -                    Aggravating factors -  Quality -  Radiation -  Timing-  Severity (0-10 scale):  Minimal acceptable level (0-10 scale):     Dyspnea:                           [ ]Mild [ ]Moderate [ ]Severe  Anxiety:                             [ ]Mild [ ]Moderate [ ]Severe  Fatigue:                             [ ]Mild [ ]Moderate [ ]Severe  Nausea:                             [ ]Mild [ ]Moderate [ ]Severe  Loss of appetite:              [ ]Mild [ ]Moderate [ ]Severe  Constipation:                    [ ]Mild [ ]Moderate [ ]Severe    PAINAD Score: 0 in last 24 hours    [email or web address].[email or web address]/painad.pdf (Ctrl +  left click to view)  		  Other Symptoms:  [ ]All other review of systems negative     Palliative Performance Status Version 2:         %         http://npcrc.org/files/news/palliative_performance_scale_ppsv2.pdf  PHYSICAL EXAM:  Vital Signs Last 24 Hrs  T(C): 36.4 (04 May 2021 08:25), Max: 36.4 (04 May 2021 08:25)  T(F): 97.5 (04 May 2021 08:25), Max: 97.5 (04 May 2021 08:25)  HR: 93 (04 May 2021 08:25) (90 - 93)  BP: 132/74 (04 May 2021 08:25) (117/69 - 132/74)  BP(mean): --  RR: 20 (04 May 2021 08:25) (20 - 20)  SpO2: 98% (04 May 2021 08:25) (98% - 98%) I&O's Summary  GENERAL:  [x]Alert  [ ]Oriented x   [ ]Lethargic  [ ]Cachexia  [ ]Unarousable  [ ]Verbal  [x]Non-Verbal  Behavioral:   [x] Anxiety  [ ] Delirium [ ] Agitation [ ] Other  HEENT:  [ ]Normal   [ ]Dry mouth   [ ]ET Tube/Trach  [ ]Oral lesions  PULMONARY:   [ ]Clear [ ]Tachypnea  [ ]Audible excessive secretions   [ ]Rhonchi        [ ]Right [ ]Left [ ]Bilateral  [ ]Crackles        [ ]Right [ ]Left [ ]Bilateral  [ ]Wheezing     [ ]Right [ ]Left [ ]Bilateral  [ x]Diminshed BS [ ]Right [ ]Left [x]Bilateral    CARDIOVASCULAR:    [ ]Regular [ ]Irregular [x]Tachy  [ ]Ramesh [ ]Murmur [ ]Other  GASTROINTESTINAL:  [x]Soft  [ ]Distended   [x]+BS  [ ]Non tender [ ]Tender  [ ]PEG [ ]OGT/ NGT   Last BM:   5/3    GENITOURINARY:  [ ]Normal [x] Incontinent   [ ]Oliguria/Anuria   [ ]Oscar  MUSCULOSKELETAL:   [ ]Normal   [ ]Weakness  [x]Bed/Wheelchair bound [ ]Edema  NEUROLOGIC:   [x]No focal deficits  [x] Cognitive impairment  [ ] Dysphagia [ ]Dysarthria [ ] Paresis [ ]Other   SKIN:   [ ]Normal  [ ]Rash     [ ]Pressure ulcer(s)  [ ]y [ ]n  Present on admission      CRITICAL CARE:  [ ] Shock Present  [ ]Septic [ ]Cardiogenic [ ]Neurologic [ ]Hypovolemic  [ ]  Vasopressors [ ]  Inotropes   [ ] Respiratory failure present [ ] Mechanical Ventilation [ ] Non-invasive ventilatory support [ ] High-Flow  [ ] Acute  [ ] Chronic [ ] Hypoxic  [ ] Hypercarbic [ ] Other  [ ] Other organ failure     LABS: No new labs    RADIOLOGY & ADDITIONAL STUDIES: No new imaging studies    PROTEIN CALORIE MALNUTRITION: [ ] mild [ ] moderate [ ] severe  [ ] underweight [ ] morbid obesity    https://www.andeal.org/vault/2440/web/files/ONC/Table_Clinical%20Characteristics%20to%20Document%20Malnutrition-White%20JV%20et%20al%779169.pdf    Height (cm): 149.9 (04-16-21 @ 22:31), 149.9 (01-13-21 @ 01:15)  Weight (kg): 55 (04-26-21 @ 07:33), 53 (01-25-21 @ 13:11)  BMI (kg/m2): 24.5 (04-26-21 @ 07:33), 23.6 (04-16-21 @ 22:31), 23.6 (01-25-21 @ 13:11)    [ ] PPSV2 < or = 30% [ ] significant weight loss [ ] poor nutritional intake [ ] anasarca Albumin, Serum: 3.4 g/dL (04-19-21 @ 01:13)    Artificial Nutrition [ ]     REFERRALS:   [ ]Chaplaincy  [ ] Hospice  [ ]Child Life  [ ]Social Work  [ ]Case management [ ]Holistic Therapy [ ] Physical Therapy [ ] Dietary   Goals of Care Document: TAIWO Nicole (04-29-21 @ 14:53)  Goals of Care Conversation:   Participants:  · Participants  Family; Staff  · Child(ren) Mohsin  · Provider  Dr. Nicole, Dr. Perez    Advance Directives:  · Caregiver:  yes  · Name  Beena Negrete  · Phone Number  [phone]    Conversation Discussion:  · Conversation  Treatment Options  · Conversation Details  Discussed with son, pt's current clinical status including patient not taking all of her oral medications. We discussed the risks and benefits of stopping several of her oral medications that will not provide benefits given her current clinical status, including her statin. Son in agreement. Will continue to touch base with him and provide updates as needed. All questions/concerns addressed.    Personal Advance Directives Treatment Guidelines:   MOLST:  · Completed  20-Apr-2021    Location of Discussion:   Duration of Advanced Care Planning Meeting:  · Time spent (in minutes)  10    Location of Discussion:  · Location of discussion  Telephone      Electronic Signatures:  Kavon Nicole)  (Signed 29-Apr-2021 14:59)  	Authored: Goals of Care Conversation, Personal Advance Directives Treatment Guidelines, Location of Discussion      Last Updated: 29-Apr-2021 14:59 by Kavon Nicole)

## 2021-06-04 NOTE — PROGRESS NOTE ADULT - PROBLEM SELECTOR PLAN 6
-c/w Levothyroxine 50 mcg daily -Takes 60 units of lantus at bedtime and 18 units of humalog   - increased Lantus and Admelog based on sliding scale.  Will continue to titrate to achieve BS goal 140-180  - Endo consulted.  Started BID Lantus dosing 1/21  - lantus reduced to 28U in AM and admelog reduced to 15u premeal 2/2 FS of 80s in AM. [phone]

## 2021-08-12 NOTE — PROGRESS NOTE ADULT - PROBLEM SELECTOR PLAN 1
c/w hydral 25 mg q8h  c/w lopressor for now; eventual switch to toprol; will need adequate rate control once euvolemic  on bumex 4 mg q12; d/c metolazone for now; increase to q8h No

## 2021-08-15 NOTE — CHART NOTE - NSCHARTNOTEFT_GEN_A_CORE
SELVIN CLAIRE  73y Female    Notified by RN patient c/o of ongoing left shoulder pain and arm pain and ? chest pressure. Patient seen and examined at bedside. Resting in bed comfortably NAD. No respiratory distress noted. No accessory muscle use noted. States she has neck pain and L shoulder / arm pain. States warm packs are helping with the pain. Received tramadol dose. Denies CP, SOB, abdominal pain, N/V, HA, dizziness. Upon chart review, patient noted to have mild chest discomfort 2/2 end stage cardiomyopathy, severe valve disease, underlying CAD.        Vital Signs Last 24 Hrs  T(C): 36.6 (14 Aug 2019 04:22), Max: 37.1 (14 Aug 2019 02:27)  T(F): 97.9 (14 Aug 2019 04:22), Max: 98.7 (14 Aug 2019 02:27)  HR: 82 (14 Aug 2019 05:23) (78 - 89)  BP: 118/63 (14 Aug 2019 05:23) (100/57 - 118/63)  BP(mean): --  RR: 28 (14 Aug 2019 04:22) (18 - 28)  SpO2: 100% (14 Aug 2019 04:22) (96% - 100%)    Physical Exam:   Constitutional: AOx3. NAD.  Neuro:  Grossly intact   Cardiovascular: +S1 S2. RRR.  No murmurs.  No LE edema.  Respiratory:  Even, unlabored.  Clear lungs bilaterally. No wheezing, rhonchi, or crackles.  Gastrointestinal: +BS X4 active. Soft. Nontender. Nondistended   Extremities/Vascular: +2 pulses bilaterally.   Skin:  warm, dry    HPI:   74 yo woman with PMHx of HTN, T2DM (A1c 7.4%), CAD s/p CABG (LIMA to LAD, SVG to OM, SVG to PDA 2014 at [facility]), non-dilated ICM (EF 20-25%), severe mitral regurgitation s/p mitral clip (6/13/19 Dr. Newman), severe pulm HTN, CKD, hypothyroidism, who is presenting to ED after experiencing worsening SOB and intermittent [facility]. Of note, pt was recently discharged on 6/19 after having mitral clip procedure completed. She initially required BiPAP with improvement in her respiratory status following diuresis. Initiated on vasopressors and inotropes in CCU, which were subsequently weaned off. Infectious work up was negative.    Ongoing L shoulder / arm pain   -continue pain regimen   -warm compresses   -EKG HR 79 sinus no acute changes from previous     will continue to monitor   will endorse to day team       Paradise Solorzano PA-C  [id number] Pt refusing pardo, bladder scan showerd [phone]

## 2022-01-15 NOTE — PROGRESS NOTE ADULT - PROBLEM SELECTOR PROBLEM 4
Systolic congestive heart failure, unspecified HF chronicity Pediatrician,   one week as needed  Dentist in 5-7 days  Phone: (   )    -  Fax: (   )    -  Follow Up Time:

## 2022-01-16 NOTE — PROGRESS NOTE ADULT - ASSESSMENT
Her chart states she only takes Losartan once daily.   71F Croatian speaking female PMH HTN, CKD, Hyperlipidemia, DM-II, CAD s/p CABG, s/p stents presenting with 3 days worsening shortness of breath. Patient with likely decompensated HF with elevate troponins likely from NSTEMI. Patient also found to have slight leukocytosis with fever and cough and consolidation on CXR initially started on antibx. Patient is currently chest pain free with increasing enzymes.

## 2022-01-25 NOTE — PATIENT PROFILE ADULT. - TEACHING/LEARNING FACTORS IMPACT ABILITY TO LEARN
none
[FreeTextEntry1] : pt has lost weight pt with multiple myeloma in remission s/p rectal cancer s/p breast cancer \par pt s/p mitral valve clip for severe mr and chf

## 2022-02-17 NOTE — CHART NOTE - NSCHARTNOTESELECT_GEN_ALL_CORE
Occupational Therapy  Patient not seen in therapy.     OT orders received, chart reviewed, and discussed with physcial therapist, who completed OT screening during PT evaluation. PT is noted to be independent at this time with no OT related concerns. Will discontinue. OT can be re-ordered if needs arise prior to discharge.                           Therapy procedure time and total treatment time can be found documented on the Time Entry flowsheet   Event Note

## 2022-03-28 NOTE — PROGRESS NOTE ADULT - ATTENDING COMMENTS
I spoke with patient made aware of results   She does not have an EP doctor at this time  Agree with plan as outlined above.

## 2022-04-25 NOTE — ED ADULT NURSE NOTE - CAS DISCH ACCOMP BY
PHONE CALL FROM PATIENT TO CHECK IN.  NOT FEELING BETTER.  MOVED UP TO THROAT, HEAD, EARS.  FEELS CRAPPY.  WHAT TO DO?    PLEASE CALL [phone]   MD/EDT

## 2022-06-20 NOTE — PROGRESS NOTE ADULT - SUBJECTIVE AND OBJECTIVE BOX
A (Device Ld Lck 65cm .017-.026in Lld 2 [place]) catheter was inserted.   Patient is a 73y old  Female who presents with a chief complaint of SOB (2019 13:35)    SUBJECTIVE / OVERNIGHT EVENTS: pt denies chest pain, shortness of breath , pts family next to pts bed    MEDICATIONS  (STANDING):  aspirin enteric coated 81 milliGRAM(s) Oral daily  atorvastatin 40 milliGRAM(s) Oral at bedtime  buMETAnide 4 milliGRAM(s) Oral every 12 hours  dextrose 5%. 1000 milliLiter(s) (50 mL/Hr) IV Continuous <Continuous>  dextrose 50% Injectable 12.5 Gram(s) IV Push once  dextrose 50% Injectable 25 Gram(s) IV Push once  dextrose 50% Injectable 25 Gram(s) IV Push once  docusate sodium 100 milliGRAM(s) Oral two times a day  heparin  Injectable 5000 Unit(s) SubCutaneous every 12 hours  hydrALAZINE 20 milliGRAM(s) Oral three times a day  insulin glargine Injectable (LANTUS) 50 Unit(s) SubCutaneous at bedtime  insulin lispro (HumaLOG) corrective regimen sliding scale   SubCutaneous three times a day before meals  insulin lispro (HumaLOG) corrective regimen sliding scale   SubCutaneous at bedtime  insulin lispro Injectable (HumaLOG) 16 Unit(s) SubCutaneous three times a day before meals  isosorbide   dinitrate Tablet (ISORDIL) 10 milliGRAM(s) Oral three times a day  levothyroxine 50 MICROGram(s) Oral daily  montelukast 10 milliGRAM(s) Oral daily  pantoprazole    Tablet 40 milliGRAM(s) Oral before breakfast  senna 2 Tablet(s) Oral at bedtime  spironolactone 25 milliGRAM(s) Oral daily  ticagrelor 90 milliGRAM(s) Oral two times a day    MEDICATIONS  (PRN):  dextrose 40% Gel 15 Gram(s) Oral once PRN Blood Glucose LESS THAN 70 milliGRAM(s)/deciliter  glucagon  Injectable 1 milliGRAM(s) IntraMuscular once PRN Glucose LESS THAN 70 milligrams/deciliter    Vital Signs Last 24 Hrs  T(C): 36.9 (2019 20:48), Max: 36.9 (2019 20:48)  T(F): 98.5 (2019 20:48), Max: 98.5 (2019 20:48)  HR: 85 (2019 20:48) (77 - 87)  BP: 101/61 (2019 20:48) (100/60 - 106/64)  BP(mean): --  RR: 18 (2019 20:48) (18 - 18)  SpO2: 98% (2019 20:48) (96% - 100%)    PHYSICAL EXAM:  GENERAL: NAD, well-developed  HEAD:  Atraumatic, Normocephalic  EYES: EOMI, conjunctiva and sclera clear  NECK: Supple, No JVD  CHEST/LUNG: Mild b/l rales at bases  HEART: Regular rate and rhythm; No murmurs, rubs, or gallops  ABDOMEN: Soft, Nontender, Nondistended; Bowel sounds present  EXTREMITIES:  2+ Peripheral Pulses, No clubbing, cyanosis, or edema  NEUROLOGY: non-focal  SKIN: No rashes or lesions    LABS:      140  |  100  |  75<H>  ----------------------------<  130<H>  3.7   |  22  |  2.40<H>    Ca    10.0      2019 06:15  Phos  4.4       Mg     2.3         TPro  8.3  /  Alb  3.9  /  TBili  0.3  /  DBili      /  AST  11  /  ALT  13  /  AlkPhos  94      Creatinine Trend: 2.40 <--, 2.45 <--, 2.57 <--, 2.12 <--, 1.95 <--, 1.87 <--, 1.82 <--, 1.96 <--, 2.10 <--, 2.13 <--                        9.2    9.1   )-----------( 412      ( 2019 06:15 )             28.1     Urine Studies:  Urinalysis Basic - ( 2019 08:00 )    Color: COLORLESS / Appearance: CLEAR / S.009 / pH: 6.0  Gluc: 500 / Ketone: NEGATIVE  / Bili: NEGATIVE / Urobili: NORMAL   Blood: NEGATIVE / Protein: NEGATIVE / Nitrite: NEGATIVE   Leuk Esterase: NEGATIVE / RBC:  / WBC    Sq Epi:  / Non Sq Epi:  / Bacteria:               LIVER FUNCTIONS - ( 2019 06:15 )  Alb: 3.9 g/dL / Pro: 8.3 g/dL / ALK PHOS: 94 U/L / ALT: 13 U/L / AST: 11 U/L / GGT: x

## 2022-06-27 NOTE — PROGRESS NOTE ADULT - PROBLEM SELECTOR PLAN 3
Assessment & Plan     1. Bloating  Differential includes constipation, minimal fiber intake, abnormal gut sandra, IBS-C, etc. Vitals and exam unremarkable.  Recommend trying conservative management at home with adequate hydration, increase fiber intake, probiotics or yogurt, increasing daily activity levels.  If no improvement over the next several weeks consider additional evaluation if needed.    2. Flatulence  See #1.  Consider over-the-counter gas and bloating medication if needed.    3. Cherry angioma  Exam consistent with cherry angioma.  Patient will notify provider if she wants this removed in the future.      Return if symptoms worsen or fail to improve.    Es Burgess PA-C  [facility] [place]    Subjective   Rakel is a 60 year old, presenting for the following health issues:  Abdominal Cramping      History of Present Illness       Reason for visit:  Gas, const., blood mole, neck  Symptom onset:  More than a month  Symptom intensity:  Moderate  Symptom progression:  Worsening  Had these symptoms before:  No    She eats 0-1 servings of fruits and vegetables daily.She consumes 0 sweetened beverage(s) daily.She exercises with enough effort to increase her heart rate 9 or less minutes per day.  She exercises with enough effort to increase her heart rate 3 or less days per week.   She is taking medications regularly.       Constipation  Onset/Duration: months ago  Description:  Frequency of bowel movements: every 3-4 days  Consistency of stool: formed  Progression of Symptoms: worsening  Accompanying signs and symptoms:    Abdominal pain: YES   Rectal pain: no   Blood in stool: no   Nausea/Vomiting: no   Weight loss or gain: no  History:   Similar problems in past: YES  History of abdominal surgery: no  Chronic laxative use: no  New medications: no  Precipitating or alleviating factors: no  Therapies tried and outcome: None    Here for evaluation of GI concerns.  Patient reports 5 to 6  months ago she developed some increased abdominal bloating, gas, lower cramping.  She does have a history of baseline constipation with bowel movements approximately 3 to 4 days.  This may have gotten a little bit worse but not significantly.  Last bowel movement was yesterday.  Does note the gas seems to be most bothersome.  Sometimes she is unable to hold it.  She has not tried anything for symptomatic management at home.  Does note that she is decreased her fiber intake with minimal fruit and vegetable, whole grain intake at home.  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  No associated fever/chills, nausea, vomiting, blood in stool, diarrhea, urinary symptoms. Most recent colonoscopy completed 11/2014 with 10 year follow up recommended.         PAST MEDICAL HISTORY:   Past Medical History:   Diagnosis Date     Diffuse cystic mastopathy of breast     No Comments Provided     Encounter for screening for malignant neoplasm of colon     11/11/2014     Female infertility     Secondary to Chlamydia and pelvic infections     Other chest pain     No Comments Provided     Other specified postprocedural states     12/8/2016     Personal history of other mental and behavioral disorders     with fatigue     Superficial foreign body of right foot     11/29/2016       PAST SURGICAL HISTORY:   Past Surgical History:   Procedure Laterality Date     COLONOSCOPY  11/13/2014    Normal 10 year follow up     Pyloric stenosis      Operated in infancy     Removal of foreign body from foot Right 12/08/2016     Salpingostomies  1997     [place], [place]     TONSILLECTOMY       TYMPANOSTOMY, LOCAL/TOPICAL ANESTHESIA      Bilateral TM perforations and tympanostomies in childhood       FAMILY HISTORY:   Family History   Problem Relation Age of Onset     Heart Disease Mother               Cancer Mother         Cancer, lung cancer     Chronic Obstructive Pulmonary Disease Mother      Hypothyroidism Father      Prostate Cancer  "Paternal Grandfather         Cancer-prostate     Neuropathy Brother      Hypothyroidism Brother      Breast Cancer No family hx of         Cancer-breast       SOCIAL HISTORY:   Social History     Tobacco Use     Smoking status: Never Smoker     Smokeless tobacco: Never Used   Substance Use Topics     Alcohol use: Not Currently     Alcohol/week: 0.0 standard drinks     Comment: rare      No Known Allergies  Current Outpatient Medications   Medication     cyclobenzaprine (FLEXERIL) 10 MG tablet     No current facility-administered medications for this visit.         Review of Systems   Per HPI        Objective    /72   Pulse 63   Temp 96.8  F (36  C)   Resp 14   Ht 1.702 m (5' 7\")   Wt 74.8 kg (164 lb 12.8 oz)   LMP  (LMP Unknown)   SpO2 98%   Breastfeeding No   BMI 25.81 kg/m    Body mass index is 25.81 kg/m .  Physical Exam   General: Pleasant, in no apparent distress.  Cardiovascular: Regular rate and rhythm with S1 equal to S2. No murmurs, friction rubs, or gallops.   Respiratory: Lungs are resonant and clear to auscultation bilaterally. No wheezes, crackles, or rhonchi.  Abdomen: Abdomen is non-distended and without bulging flanks, prominent venous markings, or ecchymosis. Active bowel sounds heard in all four quadrants. No tenderness to palpation in all four quadrants. No rebound tenderness or guarding. No palpable masses noted. No hepatosplenomegaly.  Skin: Small dark red papule to anterior right proximal thigh  Psych: Appropriate mood and affect.                .  ..  " -Continue ASA, clopidogrel.

## 2022-08-16 NOTE — PROGRESS NOTE ADULT - PROBLEM SELECTOR PROBLEM 2
Hypothyroidism Diabetes mellitus, type 2 Xelowenz Pregnancy And Lactation Text: This medication is Pregnancy Category D and is not considered safe during pregnancy.  The risk during breast feeding is also uncertain.

## 2022-08-16 NOTE — PROGRESS NOTE ADULT - SUBJECTIVE AND OBJECTIVE BOX
[facility]   MD RAHEEL SHAH MD RUORU WONG, PA    TEL:  OFFICE: [phone]  DR SANTOYO CELL: [phone]  JUSTEN KENDALL CELL: [phone]  DR. NGUYEN CELL: [phone]    RENAL FOLLOW UP NOTE  --------------------------------------------------------------------------------  HPI:      Pt seen and examined at bedside.       PAST HISTORY  --------------------------------------------------------------------------------  No significant changes to PMH, PSH, FHx, SHx, unless otherwise noted    ALLERGIES & MEDICATIONS  --------------------------------------------------------------------------------  Allergies    azithromycin (Hives; Pruritus)    Intolerances      Standing Inpatient Medications  acetaminophen  IVPB .. 1000 milliGRAM(s) IV Intermittent once  aspirin enteric coated 81 milliGRAM(s) Oral daily  atorvastatin 40 milliGRAM(s) Oral at bedtime  chlorhexidine 2% Cloths 1 Application(s) Topical daily  clopidogrel Tablet 75 milliGRAM(s) Oral daily  dextrose 5%. 1000 milliLiter(s) IV Continuous <Continuous>  dextrose 50% Injectable 12.5 Gram(s) IV Push once  dextrose 50% Injectable 25 Gram(s) IV Push once  dextrose 50% Injectable 25 Gram(s) IV Push once  docusate sodium 100 milliGRAM(s) Oral two times a day  furosemide   Injectable 60 milliGRAM(s) IV Push two times a day  hydrALAZINE 50 milliGRAM(s) Oral every 8 hours  insulin glargine Injectable (LANTUS) 40 Unit(s) SubCutaneous at bedtime  insulin lispro (HumaLOG) corrective regimen sliding scale   SubCutaneous three times a day before meals  insulin lispro (HumaLOG) corrective regimen sliding scale   SubCutaneous at bedtime  insulin lispro Injectable (HumaLOG) 24 Unit(s) SubCutaneous three times a day before meals  isosorbide   dinitrate Tablet (ISORDIL) 30 milliGRAM(s) Oral three times a day  levothyroxine 50 MICROGram(s) Oral daily  lidocaine   Patch 1 Patch Transdermal daily  melatonin 3 milliGRAM(s) Oral at bedtime  montelukast 10 milliGRAM(s) Oral daily  pantoprazole    Tablet 40 milliGRAM(s) Oral before breakfast  polyethylene glycol 3350 17 Gram(s) Oral daily  senna 2 Tablet(s) Oral at bedtime    PRN Inpatient Medications  acetaminophen   Tablet .. 650 milliGRAM(s) Oral every 6 hours PRN  dextrose 40% Gel 15 Gram(s) Oral once PRN  glucagon  Injectable 1 milliGRAM(s) IntraMuscular once PRN      REVIEW OF SYSTEMS  --------------------------------------------------------------------------------  General: no fever  CVS: no chest pain  RESP: intermittent  sob, no cough  ABD: no abdominal pain  MSK: no edema     VITALS/PHYSICAL EXAM  --------------------------------------------------------------------------------  T(C): 36.8 (07-24-19 @ 04:27), Max: 36.9 (07-23-19 @ 12:07)  HR: 68 (07-24-19 @ 06:00) (68 - 76)  BP: 114/65 (07-24-19 @ 06:00) (111/60 - 117/72)  RR: 18 (07-24-19 @ 04:27) (18 - 20)  SpO2: 100% (07-24-19 @ 04:27) (100% - 100%)  Wt(kg): --        07-23-19 @ 07:01  -  07-24-19 @ 07:00  --------------------------------------------------------  IN: 600 mL / OUT: 1400 mL / NET: -800 mL      Physical Exam:  	Gen: NAD  	HEENT: MMM  	Pulm: decreased breath sounds B/L  	CV: S1S2  	Abd: Soft, +BS  	Ext: No LE edema B/L                      Neuro: Awake   	Skin: Warm and Dry   	 no polo    LABS/STUDIES  --------------------------------------------------------------------------------              9.4    9.0   >-----------<  383      [07-24-19 @ 06:31]              28.8     137  |  97  |  46  ----------------------------<  298      [07-24-19 @ 06:39]  3.6   |  25  |  1.48        Ca     9.5     [07-24-19 @ 06:39]          CK 27      [07-24-19 @ 03:03]    Creatinine Trend:  SCr 1.48 [07-24 @ 06:39]  SCr 1.53 [07-23 @ 23:54]  SCr 1.63 [07-23 @ 06:43]  SCr 1.67 [07-22 @ 06:30]  SCr <0.30 [07-21 @ 06:32]    Urinalysis - [07-09-19 @ 13:28]      Color Yellow / Appearance Clear / SG 1.012 / pH 6.0      Gluc Negative / Ketone Negative  / Bili Negative / Urobili Negative       Blood Trace / Protein Trace / Leuk Est Negative / Nitrite Negative      RBC 1 / WBC 1 / Hyaline 0 / Gran  / Sq Epi  / Non Sq Epi 0 / Bacteria Moderate      Iron 42, TIBC 348, %sat 12      [07-05-19 @ 22:32]  Ferritin 130      [07-05-19 @ 22:32]  .4 (Ca --)      [04-25-19 @ 05:45]   --  PTH 43.55 (Ca --)      [09-10-18 @ 07:15]   --  PTH 36.60 (Ca --)      [09-08-18 @ 03:15]   --  Vitamin D (25OH) 25.9      [05-01-19 @ 04:00]  HbA1c 7.4      [07-05-19 @ 22:32]  TSH 2.00      [07-05-19 @ 22:32]  Lipid: chol 148, , HDL 35,       [06-01-19 @ 08:00] Wound Care: Petrolatum

## 2022-08-22 NOTE — ED ADULT TRIAGE NOTE - NS ED NOTE AC HIGH RISK COUNTRIES
Reason for Disposition  • Back pain from overuse (work, exercise, gardening) OR from twisting, lifting, or bending injury  • [1] SEVERE back pain (e.g., excruciating, unable to do any normal activities) AND [2] not improved 2 hours after pain medicine    Protocols used: BACK INJURY-A-, BACK PAIN-A-AH     No

## 2022-09-12 NOTE — PROGRESS NOTE ADULT - ATTENDING COMMENTS
Pt father presents to triage desk at this time. States he and pt would like to go home. RN updated ED Resident and Dr. Schmidt.    75 Thai/English speaking F PMH IDDM, CAD s/p CABG 2014 w prior PCI, severe mitral regurg (s/p mitral clip '19), pHTN, HFrEF (21%), CKDIV not on  HD with frequent hospitalizations for ADHF presenting for recurrent ASHF exacerbation, now with shift of focus to symptom management.   Heart failure medically managed.  Patient is stable for discharge with hospice care.  Patient accepted to [facility] pending insurance authorization.

## 2022-09-23 NOTE — ED ADULT NURSE REASSESSMENT NOTE - NS ED NURSE REASSESS COMMENT FT1
Report received from RJ Garcia. Pt sleeping/resting in rm 10, appears in NAD. family at bedside. Pt noted to be tachypneic, however no signs of respiratory distress such as labored breathing, accessory muscle usage, nasal flaring noted. Denies chest pain, SOB, difficulty breathing, N/V. Pt states she is just tired at this time.   Pt placed on 3L O2 via NC. · On 2-3L chronically

## 2022-09-28 NOTE — H&P ADULT - RS GEN HX ROS MEA POS PC
Chief Complaint   Patient presents with    Follow-up     Follow up from last visit (GI issues)       Visit Vitals  /85 (BP 1 Location: Left upper arm, BP Patient Position: Sitting, BP Cuff Size: Adult)   Pulse 79   Resp 16   Ht 5' 6\" (1.676 m)   Wt 274 lb 3.2 oz (124.4 kg)   BMI 44.26 kg/m²         1. \"Have you been to the ER, urgent care clinic since your last visit? Hospitalized since your last visit? \" No    2. \"Have you seen or consulted any other health care providers outside of the [address] [place] [address] since your last visit? \" No     3. For patients aged 39-70: Has the patient had a colonoscopy / FIT/ Cologuard? No      If the patient is female:    4. For patients aged 41-77: Has the patient had a mammogram within the past 2 years? Yes - no Care Gap present      5. For patients aged 21-65: Has the patient had a pap smear?  Yes - no Care Gap present dyspnea/cough

## 2022-09-30 NOTE — PROGRESS NOTE ADULT - PROBLEM SELECTOR PLAN 3
Interval History: Patient taken to OR yesterday and underwent ex-lap with resection of pelvic mass. No acute events overnight. Patient resting comfortably in bed this morning. Denies N/V. Pain is well controlled.     Medications:  Continuous Infusions:   lactated ringers 125 mL/hr at 09/30/22 0600     Scheduled Meds:   acetaminophen  1,000 mg Oral Q8H    allopurinoL  300 mg Oral Daily    amLODIPine  5 mg Oral Daily    celecoxib  200 mg Oral BID    enoxaparin  40 mg Subcutaneous Daily    iron sucrose (VENOFER) IVPB  300 mg Intravenous Once    levalbuterol  0.63 mg Nebulization Q6H WAKE    LIDOcaine  1 patch Transdermal Q24H    lisinopriL  10 mg Oral Daily    mupirocin   Nasal BID    pantoprazole  40 mg Oral Daily     PRN Meds:dextrose 10%, dextrose 10%, glucagon (human recombinant), hydrALAZINE, HYDROmorphone, insulin aspart U-100, labetalol, magnesium oxide, magnesium oxide, ondansetron, ondansetron, oxyCODONE, potassium bicarbonate, potassium bicarbonate, potassium bicarbonate, potassium, sodium phosphates, potassium, sodium phosphates, potassium, sodium phosphates, sodium chloride 0.9%, sodium chloride 0.9%     Review of patient's allergies indicates:  No Known Allergies  Objective:     Vital Signs (Most Recent):  Temp: 98.3 °F (36.8 °C) (09/30/22 0300)  Pulse: 91 (09/30/22 0734)  Resp: 16 (09/30/22 0734)  BP: (!) 172/91 (09/30/22 0600)  SpO2: 97 % (09/30/22 0734) Vital Signs (24h Range):  Temp:  [98.3 °F (36.8 °C)-98.5 °F (36.9 °C)] 98.3 °F (36.8 °C)  Pulse:  [] 91  Resp:  [13-47] 16  SpO2:  [89 %-100 %] 97 %  BP: (110-189)/(55-97) 172/91  Arterial Line BP: ()/(42-75) 118/45     Weight: 73 kg (160 lb 15 oz)  Body mass index is 27.62 kg/m².    Intake/Output - Last 3 Shifts         09/28 0700 09/29 0659 09/29 0700 09/30 0659 09/30 0700  10/01 0659    I.V. (mL/kg) 2134.9 (29.2) 3066.3 (42)     Blood 850      IV Piggyback 6499      Total Intake(mL/kg) 9483.9 (129.9) 3066.3 (42)     Urine (mL/kg/hr) 1645  (0.9) 1155 (0.7)     Drains 370 387     Stool 0      Blood 1000      Total Output 3015 1542     Net +6468.9 +1524.3            Urine Occurrence 2 x 5 x     Stool Occurrence 0 x              Physical Exam  Constitutional:       General: She is not in acute distress.     Appearance: Normal appearance.   HENT:      Head: Normocephalic and atraumatic.      Mouth/Throat:      Mouth: Mucous membranes are moist.   Eyes:      Extraocular Movements: Extraocular movements intact.      Conjunctiva/sclera: Conjunctivae normal.   Cardiovascular:      Rate and Rhythm: Normal rate and regular rhythm.   Pulmonary:      Effort: Pulmonary effort is normal. No respiratory distress.   Abdominal:      General: There is no distension.      Palpations: Abdomen is soft.      Comments: Appropriately TTP, midline incision with Prevena over top with no strikethrough, holding suction; two AKASH drains to left abdomen with SS drainage   Skin:     General: Skin is warm and dry.   Neurological:      General: No focal deficit present.      Mental Status: She is alert and oriented to person, place, and time. Mental status is at baseline.       Significant Labs:  I have reviewed all pertinent lab results within the past 24 hours.  CBC:   Recent Labs   Lab 09/30/22  0353   WBC 11.67   RBC 2.64*   HGB 7.6*   HCT 24.0*   *   MCV 91   MCH 28.8   MCHC 31.7*       BMP:   Recent Labs   Lab 09/30/22  0353   GLU 92      K 3.6      CO2 24   BUN 8   CREATININE 0.7   CALCIUM 7.9*   MG 1.9         Significant Diagnostics:  I have reviewed all pertinent imaging results/findings within the past 24 hours.   f/u cardio

## 2022-10-12 NOTE — PROGRESS NOTE ADULT - PROBLEM SELECTOR PLAN 8
- c/w atorvastatin 40mg qHS
per renal
- c/w atorvastatin 40mg qHS
- c/w atorvastatin 40mg qHS
DVT PPx:  Diet: fluid restriction, renal/diabetic/DASH diet  Dispo: admitted for hypotension in the setting of acute on chronic decompensated systolic HF
per renal
- c/w atorvastatin 40mg qHS
per renal
per renal
No

## 2022-10-20 NOTE — PROGRESS NOTE ADULT - SUBJECTIVE AND OBJECTIVE BOX
Previous order was ready at, and patient normally uses, [id number] - [place], [place] - [address]   - new order was sent to us at [facility], was that intended?    Please advise.    Tyrese Linares Pharm. D.  [facility]  #[id number] [phone] (fax)   Dr. Muhammad (Nephrology)  Office [phone]  Cell [phone]  [facility]  Cell [phone]      Patient is a 71y old  Female who presents with a chief complaint of sob,chest pain (27 Nov 2017 11:05)      Patient seen and examined at bedside. No chest pain/sob    VITALS:  T(F): 98.7 (11-30-17 @ 14:23), Max: 98.7 (11-30-17 @ 14:23)  HR: 80 (11-30-17 @ 14:23)  BP: 105/51 (11-30-17 @ 14:23)  RR: 17 (11-30-17 @ 14:23)  SpO2: 98% (11-30-17 @ 14:23)  Wt(kg): --        PHYSICAL EXAM:  Constitutional: NAD  Neck: No JVD  Respiratory: CTAB, no wheezes, rales or rhonchi  Cardiovascular: S1, S2, RRR  Gastrointestinal: BS+, soft, NT/ND  Extremities: No peripheral edema    Hospital Medications:   MEDICATIONS  (STANDING):  aspirin enteric coated 81 milliGRAM(s) Oral daily  atorvastatin 40 milliGRAM(s) Oral at bedtime  clopidogrel Tablet 75 milliGRAM(s) Oral daily  dextrose 5%. 1000 milliLiter(s) (50 mL/Hr) IV Continuous <Continuous>  dextrose 50% Injectable 12.5 Gram(s) IV Push once  dextrose 50% Injectable 25 Gram(s) IV Push once  dextrose 50% Injectable 25 Gram(s) IV Push once  docusate sodium 100 milliGRAM(s) Oral three times a day  furosemide    Tablet 40 milliGRAM(s) Oral daily  insulin glargine Injectable (LANTUS) 55 Unit(s) SubCutaneous at bedtime  insulin lispro (HumaLOG) corrective regimen sliding scale   SubCutaneous three times a day before meals  insulin lispro (HumaLOG) corrective regimen sliding scale   SubCutaneous at bedtime  insulin lispro Injectable (HumaLOG) 15 Unit(s) SubCutaneous three times a day before meals  levothyroxine 50 MICROGram(s) Oral daily  metoprolol succinate ER 12.5 milliGRAM(s) Oral daily  montelukast 10 milliGRAM(s) Oral at bedtime  pantoprazole    Tablet 40 milliGRAM(s) Oral two times a day before meals  polyethylene glycol 3350 17 Gram(s) Oral daily  senna 2 Tablet(s) Oral at bedtime  sucralfate suspension 1 Gram(s) Oral four times a day      LABS:  11-30    135  |  98  |  59<H>  ----------------------------<  298<H>  4.3   |  19<L>  |  2.25<H>    Ca    8.6      30 Nov 2017 07:05  Mg     2.3     11-30      Creatinine Trend: 2.25 <--, 2.06 <--, 2.33 <--, 1.78 <--, 1.90 <--, 1.89 <--, 1.99 <--, 2.16 <--, 2.28 <--                                10.6   10.96 )-----------( 349      ( 30 Nov 2017 07:05 )             31.4     Urine Studies:  Urinalysis - [11-25-17 @ 01:00]      Color PLYEL / Appearance CLEAR / SG 1.020 / pH 6.0      Gluc >1000 / Ketone NEGATIVE  / Bili NEGATIVE / Urobili NORMAL       Blood TRACE / Protein 150 / Leuk Est NEGATIVE / Nitrite NEGATIVE      RBC 2-5 / WBC 10-25 / Hyaline 2-5 / Gran  / Sq Epi OCC / Non Sq Epi  / Bacteria FEW      HbA1c 9.1      [11-25-17 @ 06:30]  TSH 0.79      [11-25-17 @ 06:30]  Lipid: chol 136, , HDL 37, LDL 80      [11-25-17 @ 06:30]        RADIOLOGY & ADDITIONAL STUDIES:

## 2022-11-29 NOTE — PROGRESS NOTE ADULT - ASSESSMENT
Pt ambulatory to triage from home with c/o no bowel movement x 13 days.  Pt states seen here 1 week ago, spoke of the constipation and was told everything was okay.  Pt wearing mask in triage. Triage personnel wore appropriate PPE     75y.o F Korean speaking h/o HTN, HLD, DM, CAD s/p CABG 2014 and prior PCI, severe mitral regurgitation (s/p mitral clip 2019), pulmonary HTN (TTE 2019), HF (EF 21 % June 2019), CKD IV not on dialysis (b/l SCr 2-2.2), hypothyroidism BIBEMS 2/2 worsening work of breathing, found to have crackles, elevated proBNP and b/l pulmonary edema on CXR c/w acute exacerbation of CHF.

## 2022-12-27 NOTE — PHYSICAL THERAPY INITIAL EVALUATION ADULT - CRITERIA FOR SKILLED THERAPEUTIC INTERVENTIONS
Xray shows  Subacute to chronic fracture of the base of the fused middle/ distal  phalanx of the left fifth toe.- refer to podiatry.
impairments found

## 2023-01-11 NOTE — PROGRESS NOTE ADULT - NEUROLOGICAL DETAILS
normal strength/alert and oriented x 3/responds to verbal commands
normal strength/responds to verbal commands
alert and oriented x 3/responds to verbal commands/normal strength
responds to verbal commands/alert and oriented x 3/normal strength
alert and oriented x 3/responds to verbal commands
Her/She

## 2023-01-23 NOTE — PATIENT PROFILE ADULT - ABILITY TO HEAR (WITH HEARING AID OR HEARING APPLIANCE IF NORMALLY USED):
Adequate: hears normal conversation without difficulty impairments found/functional limitations in following categories

## 2023-02-02 NOTE — PROGRESS NOTE ADULT - SUBJECTIVE AND OBJECTIVE BOX
See POC for initial evaluation.    Patient is a 72y old  Female who presents with a chief complaint of SOB (25 Jan 2018 15:14)  nad    INTERVAL HPI/OVERNIGHT EVENTS:  T(C): 36.7 (02-02-18 @ 14:56), Max: 37.1 (02-02-18 @ 05:47)  HR: 86 (02-02-18 @ 14:56) (80 - 86)  BP: 106/61 (02-02-18 @ 14:56) (106/61 - 128/67)  RR: 17 (02-02-18 @ 14:56) (16 - 17)  SpO2: 100% (02-02-18 @ 14:56) (98% - 100%)  Wt(kg): --  I&O's Summary    01 Feb 2018 07:01  -  02 Feb 2018 07:00  --------------------------------------------------------  IN: 1600 mL / OUT: 0 mL / NET: 1600 mL    02 Feb 2018 07:01  -  02 Feb 2018 17:36  --------------------------------------------------------  IN: 220 mL / OUT: 0 mL / NET: 220 mL        LABS:                        9.8    5.58  )-----------( 263      ( 02 Feb 2018 04:00 )             31.2     02-02    138  |  100  |  35<H>  ----------------------------<  315<H>  4.0   |  23  |  1.70<H>    Ca    8.5      02 Feb 2018 04:00  Mg     1.8     02-02          CAPILLARY BLOOD GLUCOSE      POCT Blood Glucose.: 238 mg/dL (02 Feb 2018 17:14)  POCT Blood Glucose.: 213 mg/dL (02 Feb 2018 13:18)  POCT Blood Glucose.: 287 mg/dL (02 Feb 2018 08:56)  POCT Blood Glucose.: 230 mg/dL (01 Feb 2018 21:37)            MEDICATIONS  (STANDING):  aspirin enteric coated 81 milliGRAM(s) Oral daily  atorvastatin 40 milliGRAM(s) Oral at bedtime  dextrose 5%. 1000 milliLiter(s) (50 mL/Hr) IV Continuous <Continuous>  dextrose 50% Injectable 12.5 Gram(s) IV Push once  dextrose 50% Injectable 25 Gram(s) IV Push once  dextrose 50% Injectable 25 Gram(s) IV Push once  famotidine    Tablet 20 milliGRAM(s) Oral daily  furosemide    Tablet 40 milliGRAM(s) Oral daily  heparin  Injectable 5000 Unit(s) SubCutaneous every 12 hours  insulin glargine Injectable (LANTUS) 20 Unit(s) SubCutaneous every morning  insulin glargine Injectable (LANTUS) 68 Unit(s) SubCutaneous at bedtime  insulin lispro (HumaLOG) corrective regimen sliding scale   SubCutaneous three times a day before meals  insulin lispro (HumaLOG) corrective regimen sliding scale   SubCutaneous at bedtime  insulin lispro Injectable (HumaLOG) 24 Unit(s) SubCutaneous three times a day before meals  levothyroxine 50 MICROGram(s) Oral daily  metoprolol succinate ER 12.5 milliGRAM(s) Oral daily  montelukast 10 milliGRAM(s) Oral at bedtime  oseltamivir 30 milliGRAM(s) Oral two times a day  sodium bicarbonate 650 milliGRAM(s) Oral three times a day  sodium chloride 0.9% lock flush 3 milliLiter(s) IV Push every 8 hours  ticagrelor 90 milliGRAM(s) Oral two times a day    MEDICATIONS  (PRN):  acetaminophen   Tablet 650 milliGRAM(s) Oral every 6 hours PRN For Temp greater than 38 C (100.4 F)  acetaminophen   Tablet. 650 milliGRAM(s) Oral every 6 hours PRN Mild, moderate and severe pain  benzocaine 15 mG/menthol 3.6 mG Lozenge 1 Lozenge Oral every 6 hours PRN Sore Throat  dextrose Gel 1 Dose(s) Oral once PRN Blood Glucose LESS THAN 70 milliGRAM(s)/deciliter  glucagon  Injectable 1 milliGRAM(s) IntraMuscular once PRN Glucose LESS THAN 70 milligrams/deciliter  guaiFENesin    Syrup 100 milliGRAM(s) Oral every 6 hours PRN Cough  sodium chloride 0.65% Nasal 1 Spray(s) Both Nostrils three times a day PRN Nasal Congestion          PHYSICAL EXAM:  GENERAL: NAD, well-groomed, well-developed  HEAD:  Atraumatic, Normocephalic  CHEST/LUNG: Clear to percussion bilaterally; No rales, rhonchi, wheezing, or rubs  HEART: Regular rate and rhythm; No murmurs, rubs, or gallops  ABDOMEN: Soft, Nontender, Nondistended; Bowel sounds present  EXTREMITIES:  2+ Peripheral Pulses, No clubbing, cyanosis, or edema  LYMPH: No lymphadenopathy noted  SKIN: No rashes or lesions    Care Discussed with Consultants/Other Providers [ ] YES  [ ] NO

## 2023-03-01 NOTE — PROGRESS NOTE ADULT - PROBLEM SELECTOR PLAN 2
Bill For Surgical Tray: no Continue current Synthroid dose, FU Expected Date Of Service: 03/01/2023 Billing Type: Third-Party Bill

## 2023-03-16 NOTE — PROGRESS NOTE ADULT - PROBLEM SELECTOR PLAN 2
-Clinical signs of volume overload. CXR shows pulmonary edema and elevated proBNP.   -TTE showed EF of 20% with global LV and RV dysfunction  -C/w metoprolol 12.5 mg BID and Bumex 4 mg BID   -Cardiology consulted for GDMT, recommended dobutamine if Cr keeps rising, CHF team called left upper arm -Clinical signs of volume overload. CXR shows pulmonary edema and elevated proBNP.   -TTE showed EF of 20% with global LV and RV dysfunction  -C/w metoprolol 12.5 mg BID and Bumex 4 mg TID  -Cardiology consulted for GDMT, recommended dobutamine if Cr keeps rising, CHF team called as well as cardiology as Cr increasing

## 2023-04-14 NOTE — PROVIDER CONTACT NOTE (OTHER) - RECOMMENDATIONS
PA made aware.  Hold pre-meal insulin
, repositioned patient, notify provider
Consider AM labs
ECG, .5 morphine IVpush
NP made aware
Notify NP for further recommendation
Notify NP for further recommendations
Notify provider.
PA notified   Lidocain patch and tylenol given per routine
PA notified   Will continue to monitor
PA notified.
PA was made aware. Will continue to monitor.
Provider made aware.
digoxin level resulted today
notified NP, will continue monitor pt's blood glucose
notify NP
notify provider
repositioned patient, warm compress applied, pain medication given. Notified provider
None

## 2023-06-05 NOTE — DIETITIAN INITIAL EVALUATION ADULT. - BODY MASS INDEX
[No Acute Distress] : no acute distress [Normal Oropharynx] : normal oropharynx [Low Lying Soft Palate] : low lying soft palate [Enlarged Base of the Tongue] : enlarged base of the tongue [III] : Mallampati Class: III [2+] : Right Tonsil: 2+ [Normal Appearance] : normal appearance [Neck Circumference: ___] : neck circumference: [unfilled] [No Neck Mass] : no neck mass [Normal Rate/Rhythm] : normal rate/rhythm [Normal S1, S2] : normal s1, s2 [No Murmurs] : no murmurs [No Resp Distress] : no resp distress [Clear to Auscultation Bilaterally] : clear to auscultation bilaterally [No Abnormalities] : no abnormalities [Benign] : benign [Normal Gait] : normal gait [No Clubbing] : no clubbing [No Cyanosis] : no cyanosis [No Edema] : no edema [FROM] : FROM [Normal Color/ Pigmentation] : normal color/ pigmentation [No Focal Deficits] : no focal deficits [Oriented x3] : oriented x3 [Normal Affect] : normal affect 22.2

## 2023-06-21 NOTE — PATIENT PROFILE ADULT - EQUIPMENT CURRENTLY USED AT HOME
When Should The Patient Follow-Up For Their Next Full-Body Skin Exam?: 6 Months Detail Level: Detailed Quality 137: Melanoma: Continuity Of Care - Recall System: Patient information entered into a recall system that includes: target date for the next exam specified AND a process to follow up with patients regarding missed or unscheduled appointments Detail Level: Simple no

## 2023-08-15 NOTE — DISCHARGE NOTE PROVIDER - NSDCHHATTENDCERT_GEN_ALL_CORE
H&P reviewed. The patient was examined and there are no changes to the H&P.   My signature below certifies that the above stated patient is homebound and upon completion of the Face-To-Face encounter, has the need for intermittent skilled nursing, physical therapy and/or speech or occupational therapy services in their home for their current diagnosis as outlined in their initial plan of care. These services will continue to be monitored by myself or another physician.

## 2023-09-08 NOTE — PATIENT PROFILE ADULT. - NS TRANSFER PATIENT BELONGINGS
[FreeTextEntry1] : HISTORY:  47 y/o female presents as follow up for ?RA/SLE  Pt had diagnosis of RA at age 6 in setting of Strep throat. Pt moved with [place] and her disease and was taken off RA meds with remission of disease for many years. Pt's RA was rediagnosed at age 22s and treated with a medication that she does not remember. Pt only had rare recurrent symptoms until 2022.  In 2022, patient started to have recurrent weight loss 30 lbs, joint pains (hands, L shoulder, L hip, R foot) worst in AM, fever (up to 103F at night).  Pt was started on HCQ last year but stopped due to GI issues (but pt had C. diff at the time). Pt currently takes PDN 40mg/day x >1 months.  PDN has been giving patient anxiety, depression  Pt states she has no plans for further pregnancy and rarely drinks EtOH   Patient has clear history of systemic autoimmune inflammatory disease, diagnosed as RA in past. Pt's autoimmune disease seems to be aggressive, with pt having significant symptoms on PDN 40mg/day and causing fevers. Pt likely needs aggressive DMARDs (high likelihood of biologics needed in the future based on my impression) to control underlying disease and minimize steroid use.   Pt was tried on MTX in 3/2023 with improvement in her fevers and joint pains and was able to wean off PDN. However, it had to be stopped in 5/2023 due to severe nausea, diarrhea, hair thinning.  Discussed with pt about medication choices. Given pt's significant GI side effects to oral medications and fear of self-injection, we decided to start Remicade.   INTERVAL HISTORY:  Pt was started on Remicade 3mg/kg and felt it was starting to decrease her flares but stopped after 2 doses due to insurance issues, last dosing ~2 months ago. Pt has been having inflammatory arthritis flare for which she has been taking PDN (currently on 40mg, but planning to taper).  WORKUP:  Remarkable for (3/2023): ESR 32, LUIS F 1:80 homogeneous, Quantiferon TB indeterminate  Normal/neg (3/2023): CRP, U/A, UPCR, dsDNA, SSA, SSB, Bradshaw, RNP, C3, C4, Thyroid Ab, RF, CCP, HLA-B27, 14.3.3 eta protein, CPK, ACE level, Hep B/C 
None

## 2023-09-10 NOTE — PROGRESS NOTE ADULT - SUBJECTIVE AND OBJECTIVE BOX
Medications:  acetaminophen   Tablet .. 650 milliGRAM(s) Oral every 6 hours PRN  ALBUTerol/ipratropium for Nebulization 3 milliLiter(s) Nebulizer every 6 hours  ALBUTerol/ipratropium for Nebulization. 3 milliLiter(s) Nebulizer once PRN  aspirin enteric coated 81 milliGRAM(s) Oral daily  atorvastatin 40 milliGRAM(s) Oral at bedtime  buDESOnide 160 MICROgram(s)/formoterol 4.5 MICROgram(s) Inhaler 2 Puff(s) Inhalation two times a day  buMETAnide 1 milliGRAM(s) Oral daily  chlorhexidine 4% Liquid 1 Application(s) Topical <User Schedule>  dextrose 40% Gel 15 Gram(s) Oral once PRN  dextrose 5%. 1000 milliLiter(s) IV Continuous <Continuous>  dextrose 50% Injectable 12.5 Gram(s) IV Push once  dextrose 50% Injectable 25 Gram(s) IV Push once  dextrose 50% Injectable 25 Gram(s) IV Push once  diphenhydrAMINE 25 milliGRAM(s) Oral every 4 hours PRN  docusate sodium 100 milliGRAM(s) Oral two times a day  gabapentin 100 milliGRAM(s) Oral two times a day  glucagon  Injectable 1 milliGRAM(s) IntraMuscular once PRN  heparin  Injectable 5000 Unit(s) SubCutaneous every 8 hours  hydrALAZINE 25 milliGRAM(s) Oral three times a day  influenza   Vaccine 0.5 milliLiter(s) IntraMuscular once  insulin glargine Injectable (LANTUS) 65 Unit(s) SubCutaneous at bedtime  insulin lispro (HumaLOG) corrective regimen sliding scale   SubCutaneous three times a day before meals  insulin lispro (HumaLOG) corrective regimen sliding scale   SubCutaneous at bedtime  insulin lispro Injectable (HumaLOG) 30 Unit(s) SubCutaneous three times a day before meals  isosorbide   dinitrate Tablet (ISORDIL) 10 milliGRAM(s) Oral three times a day  levothyroxine 50 MICROGram(s) Oral daily  montelukast 10 milliGRAM(s) Oral daily  ondansetron Injectable 4 milliGRAM(s) IV Push once PRN  pantoprazole    Tablet 40 milliGRAM(s) Oral before breakfast  polyethylene glycol 3350 17 Gram(s) Oral two times a day  predniSONE   Tablet 20 milliGRAM(s) Oral daily  senna 2 Tablet(s) Oral at bedtime  sodium chloride 0.65% Nasal 1 Spray(s) Both Nostrils three times a day  ticagrelor 90 milliGRAM(s) Oral two times a day      Vitals:  Vital Signs Last 24 Hrs  T(C): 36.5 (03 May 2019 06:59), Max: 36.7 (02 May 2019 11:42)  T(F): 97.7 (03 May 2019 06:59), Max: 98 (02 May 2019 11:42)  HR: 88 (03 May 2019 09:56) (61 - 99)  BP: 120/63 (03 May 2019 06:59) (96/51 - 120/63)  BP(mean): 66 (02 May 2019 17:00) (65 - 76)  RR: 18 (03 May 2019 06:59) (17 - 27)  SpO2: 99% (03 May 2019 09:56) (96% - 100%)    Daily     Daily Weight in k.7 (03 May 2019 10:31)    I&O's Detail    02 May 2019 07:01  -  03 May 2019 07:00  --------------------------------------------------------  IN:    Oral Fluid: 340 mL  Total IN: 340 mL    OUT:    Indwelling Catheter - Urethral: 386 mL    Voided: 175 mL  Total OUT: 561 mL    Total NET: -221 mL      03 May 2019 07:01  -  03 May 2019 11:37  --------------------------------------------------------  IN:  Total IN: 0 mL    OUT:    Voided: 250 mL  Total OUT: 250 mL    Total NET: -250 mL          Physical Exam:     General: No distress. Comfortable.  HEENT: EOM intact.  Neck: Neck supple. JVP not elevated. No masses  Chest: Clear to auscultation bilaterally  CV: Normal S1 and S2. No murmurs, rub, or gallops. Radial pulses normal.  Abdomen: Soft, non-distended, non-tender  Skin: No rashes or skin breakdown  Neurology: Alert and oriented times three. Sensation intact  Psych: Affect normal    Labs:                        8.5    13.67 )-----------( 395      ( 03 May 2019 06:20 )             27.6     -    141  |  104  |  80<H>  ----------------------------<  158<H>  4.3   |  20<L>  |  2.28<H>    Ca    9.4      03 May 2019 06:20  Phos  3.7     05-  Mg     2.8         TPro  7.6  /  Alb  3.8  /  TBili  0.3  /  DBili  x   /  AST  25  /  ALT  20  /  AlkPhos  62   Patient seen and examined. Her foot pain was much better controlled today. Denies SOB, orthopnea, PND, CP, palpitations.     Medications:  acetaminophen   Tablet .. 650 milliGRAM(s) Oral every 6 hours PRN  ALBUTerol/ipratropium for Nebulization 3 milliLiter(s) Nebulizer every 6 hours  ALBUTerol/ipratropium for Nebulization. 3 milliLiter(s) Nebulizer once PRN  aspirin enteric coated 81 milliGRAM(s) Oral daily  atorvastatin 40 milliGRAM(s) Oral at bedtime  buDESOnide 160 MICROgram(s)/formoterol 4.5 MICROgram(s) Inhaler 2 Puff(s) Inhalation two times a day  buMETAnide 1 milliGRAM(s) Oral daily  chlorhexidine 4% Liquid 1 Application(s) Topical <User Schedule>  dextrose 40% Gel 15 Gram(s) Oral once PRN  dextrose 5%. 1000 milliLiter(s) IV Continuous <Continuous>  dextrose 50% Injectable 12.5 Gram(s) IV Push once  dextrose 50% Injectable 25 Gram(s) IV Push once  dextrose 50% Injectable 25 Gram(s) IV Push once  diphenhydrAMINE 25 milliGRAM(s) Oral every 4 hours PRN  docusate sodium 100 milliGRAM(s) Oral two times a day  gabapentin 100 milliGRAM(s) Oral two times a day  glucagon  Injectable 1 milliGRAM(s) IntraMuscular once PRN  heparin  Injectable 5000 Unit(s) SubCutaneous every 8 hours  hydrALAZINE 25 milliGRAM(s) Oral three times a day  influenza   Vaccine 0.5 milliLiter(s) IntraMuscular once  insulin glargine Injectable (LANTUS) 65 Unit(s) SubCutaneous at bedtime  insulin lispro (HumaLOG) corrective regimen sliding scale   SubCutaneous three times a day before meals  insulin lispro (HumaLOG) corrective regimen sliding scale   SubCutaneous at bedtime  insulin lispro Injectable (HumaLOG) 30 Unit(s) SubCutaneous three times a day before meals  isosorbide   dinitrate Tablet (ISORDIL) 10 milliGRAM(s) Oral three times a day  levothyroxine 50 MICROGram(s) Oral daily  montelukast 10 milliGRAM(s) Oral daily  ondansetron Injectable 4 milliGRAM(s) IV Push once PRN  pantoprazole    Tablet 40 milliGRAM(s) Oral before breakfast  polyethylene glycol 3350 17 Gram(s) Oral two times a day  predniSONE   Tablet 20 milliGRAM(s) Oral daily  senna 2 Tablet(s) Oral at bedtime  sodium chloride 0.65% Nasal 1 Spray(s) Both Nostrils three times a day  ticagrelor 90 milliGRAM(s) Oral two times a day      Vitals:  Vital Signs Last 24 Hrs  T(C): 36.5 (03 May 2019 06:59), Max: 36.7 (02 May 2019 11:42)  T(F): 97.7 (03 May 2019 06:59), Max: 98 (02 May 2019 11:42)  HR: 88 (03 May 2019 09:56) (61 - 99)  BP: 120/63 (03 May 2019 06:59) (96/51 - 120/63)  BP(mean): 66 (02 May 2019 17:00) (65 - 76)  RR: 18 (03 May 2019 06:59) (17 - 27)  SpO2: 99% (03 May 2019 09:56) (96% - 100%)    Daily     Daily Weight in k.7 (03 May 2019 10:31)    I&O's Detail    02 May 2019 07:01  -  03 May 2019 07:00  --------------------------------------------------------  IN:    Oral Fluid: 340 mL  Total IN: 340 mL    OUT:    Indwelling Catheter - Urethral: 386 mL    Voided: 175 mL  Total OUT: 561 mL    Total NET: -221 mL      03 May 2019 07:01  -  03 May 2019 11:37  --------------------------------------------------------  IN:  Total IN: 0 mL    OUT:    Voided: 250 mL  Total OUT: 250 mL    Total NET: -250 mL          Physical Exam:     General: No distress. Comfortable.  HEENT: EOM intact.  Neck: Neck supple. JVP mildly elevated. No masses  Chest: Clear to auscultation bilaterally  CV: S1 and S2. No murmurs, rub, or gallops. Radial pulses normal. No LE edema b/l.   Abdomen: Soft, non-distended, non-tender  Skin: No rashes or skin breakdown  Neurology: Alert and oriented times three. Sensation intact  Psych: Affect normal    Labs:                        8.5    13.67 )-----------( 395      ( 03 May 2019 06:20 )             27.6     05-03    141  |  104  |  80<H>  ----------------------------<  158<H>  4.3   |  20<L>  |  2.28<H>    Ca    9.4      03 May 2019 06:20  Phos  3.7     05-03  Mg     2.8     05-03    TPro  7.6  /  Alb  3.8  /  TBili  0.3  /  DBili  x   /  AST  25  /  ALT  20  /  AlkPhos  62  05-03    < from: Transesophageal Echocardiogram w/o TTE (19 @ 18:25) >  OBSERVATIONS:  Mitral Valve: Mitral annular calcification and calcified  mitral leaflets which are tethered. The posterior mitral  leaflet is particularly tethered.  Severe mitral  regurgitation which originates along the coaptation line.  Aortic Root: Normal aortic root, aortic arch and descending  thoracic aorta.  Aortic Valve: Calcified trileaflet aortic valve with normal  opening.  Left Atrium: Left atrial enlargement. Left atrial appendage  could not be visualized, unclear if it was ligated during  prior CABG.  Left Ventricle: Severe  left ventricular systolic  dysfunction. Left ventricular enlargement.  Right Heart: Normal right atrium. Right ventricular  enlargement with decreased right ventricular systolic  function. Normal tricuspid valve. Normal pulmonic valve.  Pericardium/PleuraNormal pericardium with no pericardial  effusion.  Hemodynamic: Agitated saline injection demonstrates  evidence of a patent foramen ovale with left to right flow.    < end of copied text > multiple medical complaints

## 2023-09-14 NOTE — PROGRESS NOTE ADULT - ASSESSMENT
Detail Level: Detailed 75 Indonesian speaking F PMH IDDM, CAD s/p CABG 2014 w prior PCI, severe mitral regurg (s/p mitral clip '19), pHTN, HFrEF (21%), CKDIV not on  HD with frequent hospitalizations for ADHF presenting for recurrent ADHF exacerbation, now with shift of focus to symptom management.

## 2023-09-27 NOTE — DIETITIAN INITIAL EVALUATION ADULT. - PROBLEM/PLAN-8
DISPLAY PLAN FREE TEXT no Ivermectin Counseling:  Patient instructed to take medication on an empty stomach with a full glass of water.  Patient informed of potential adverse effects including but not limited to nausea, diarrhea, dizziness, itching, and swelling of the extremities or lymph nodes.  The patient verbalized understanding of the proper use and possible adverse effects of ivermectin.  All of the patient's questions and concerns were addressed.

## 2023-10-12 NOTE — PHYSICAL THERAPY INITIAL EVALUATION ADULT - GAIT DEVIATIONS NOTED, PT EVAL
Medicare Wellness Visit  Plan for Preventive Care    A good way for you to stay healthy is to use preventive care.  Medicare covers many services that can help you stay healthy.* The goal of these services is to find any health problems as quickly as possible. Finding problems early can help make them easier to treat.  Your personal plan below lists the services you may need and when they are due.      Health Maintenance Summary       Influenza Vaccine (1)  Due since 9/1/2023    Hepatitis B Vaccine (For Physician/APC Discussion) (2 of 3 - 19+ 3-dose series)  Postponed until 10/12/2024    Depression Screening (Yearly)  Next due on 10/5/2024    Traditional Medicare- Medicare Wellness Visit (Yearly)  Next due on 10/12/2024    DTaP/Tdap/Td Vaccine (3 - Td or Tdap)  Next due on 5/21/2028    Colorectal Cancer Screen- (Colonoscopy - Every 10 Years)  Next due on 8/25/2032    Hepatitis C Screening   Completed    Shingles Vaccine   Completed    COVID-19 Vaccine   Completed    Pneumococcal Vaccine 65+   Completed    Abdominal Aortic Aneurysm (AAA) Screening   Completed    Meningococcal Vaccine   Aged Out    HPV Vaccine   Aged Out             Preventive Care for Women and Men    Heart Screenings (Cardiovascular):  Blood tests are used to check your cholesterol, lipid and triglyceride levels. High levels can increase your risk for heart disease and stroke. High levels can be treated with medications, diet and exercise. Lowering your levels can help keep your heart and blood vessels healthy.  Your provider will order these tests if they are needed.    An ultrasound is done to see if you have an abdominal aortic aneurysm (AAA).  This is an enlargement of one of the main blood vessels that delivers blood to the body.   In the [place], 9,000 deaths are caused by AAA.  You may not even know you have this problem and as many as 1 in 3 people will have a serious problem if it is not treated.  Early diagnosis allows for more  effective treatment and cure.  If you have a family history of AAA or are a male age 65-75 who has smoked, you are at higher risk of an AAA.  Your provider can order this test, if needed.    Colorectal Screening:  There are many tests that are used to check for cancer of your colon and rectum. You and your provider should discuss what test is best for you and when to have it done.  Options include:  Screening Colonoscopy: exam of the entire colon, seen through a flexible lighted tube.  Flexible Sigmoidoscopy: exam of the last third (sigmoid portion) of the colon and rectum, seen through a flexible lighted tube.  Cologuard DNA stool test: a sample of your stool is used to screen for cancer and unseen blood in your stool.  Fecal Occult Blood Test: a sample of your stool is studied to find any unseen blood    Flu Shot:  An immunization that helps to prevent influenza (the flu). You should get this every year. The best time to get the shot is in the fall.    Pneumococcal Shot:  Vaccines help prevent pneumococcal disease, which is any type of illness caused by Streptococcus pneumoniae bacteria. There are two kinds of pneumococcal vaccines available in the [place]:   Pneumococcal conjugate vaccines (PCV20 or Girtxmf92®)  Pneumococcal polysaccharide vaccine (PPSV23 or [id number]®)  For those who have never received any pneumococcal conjugate vaccine, CDC recommends PVC20 for adults 65 years or older and adults 19 through 64 years old with certain medical conditions or risk factors.   For those who have previously received PCV13, this should be followed by a dose of PPSV23.     Hepatitis B Shot:  An immunization that helps to protect people from getting Hepatitis B. Hepatitis B is a virus that spreads through contact with infected blood or body fluids. Many people with the virus do not have symptoms.  The virus can lead to serious problems, such as liver disease. Some people are at higher risk than others. Your  doctor will tell you if you need this shot.     Diabetes Screening:  A test to measure sugar (glucose) in your blood is called a fasting blood sugar. Fasting means you cannot have food or drink for at least 8 hours before the test. This test can detect diabetes long before you may notice symptoms.    Glaucoma Screening:  Glaucoma screening is performed by your eye doctor. The test measures the fluid pressure inside your eyes to determine if you have glaucoma.     Hepatitis C Screening:  A blood test to see if you have the hepatitis C virus.  Hepatitis C attacks the liver and is a major cause of chronic liver disease.  Medicare will cover a single screening for all adults born between 1945 & 1965, or high risk patients (people who have injected illegal drugs or people who have had blood transfusions).  High risk patients who continue to inject illegal drugs can be screened for Hepatitis C every year.    Smoking and Tobacco-Use Cessation Counseling:  Tobacco is the single greatest cause of disease and early death in our country today. Medication and counseling together can increase a person’s chance of quitting for good.   Medicare covers two quitting attempts per year, with four counseling sessions per attempt (eight sessions in a 12 month period)    Preventive Screening tests for Women    Screening Mammograms and Breast Exams:  An x-ray of your breasts to check for breast cancer before you or your doctor may be able to feel it.  If breast cancer is found early it can usually be treated with success.    Pelvic Exams and Pap Tests:  An exam to check for cervical and vaginal cancer. A Pap test is a lab test in which cells are taken from your cervix and sent to the lab to look for signs of cervical cancer. If cancer of the cervix is found early, chances for a cure are good. Testing can generally end at age 65, or if a woman has a hysterectomy for a benign condition. Your provider may recommend more frequent testing if  certain abnormal results are found.    Bone Mass Measurements:  A painless x-ray of your bone density to see if you are at risk for a broken bone. Bone density refers to the thickness of bones or how tightly the bone tissue is packed.    Preventive Screening tests for Men    Prostate Screening:  Should you have a prostate cancer test (PSA)?  It is up to you to decide if you want a prostate cancer test. Talk to your clinician to find out if the test is right for you.  Things for you to consider and talk about should include:  Benefits and harms of the test  Your family history  How your race/ethnicity may influence the test  If the test may impact other medical conditions you have  Your values on screenings and treatments    *Medicare pays for many preventive services to keep you healthy. For some of these services, you might have to pay a deductible, coinsurance, and / or copayment.  The amounts vary depending on the type of services you need and the kind of Medicare health plan you have.    For further details on screenings offered by Medicare please visit: https://www.medicare.gov/coverage/preventive-screening-services    decreased velocity of limb motion/decreased step length

## 2023-12-04 NOTE — ED ADULT TRIAGE NOTE - CCCP TRG CHIEF CMPLNT
Called patient and relayed the information below. Patient said she tried to have [facility] transfer it to [facility] but [facility] SOB

## 2024-01-23 NOTE — DISCHARGE NOTE ADULT - CARE PROVIDER_API CALL
Anesthesia Volume In Cc: 12 Fusiform Excision Additional Text (Leave Blank If You Do Not Want): The margin was drawn around the clinically apparent lesion.  A fusiform shape was then drawn on the skin incorporating the lesion and margins.  Incisions were then made along these lines to the appropriate tissue plane and the lesion was extirpated. Interpolation Flap Text: A decision was made to reconstruct the defect utilizing an interpolation axial flap and a staged reconstruction.  A telfa template was made of the defect.  This telfa template was then used to outline the interpolation flap.  The donor area for the pedicle flap was then injected with anesthesia.  The flap was excised through the skin and subcutaneous tissue down to the layer of the underlying musculature.  The interpolation flap was carefully excised within this deep plane to maintain its blood supply.  The edges of the donor site were undermined.   The donor site was closed in a primary fashion.  The pedicle was then rotated into position and sutured.  Once the tube was sutured into place, adequate blood supply was confirmed with blanching and refill.  The pedicle was then wrapped with xeroform gauze and dressed appropriately with a telfa and gauze bandage to ensure continued blood supply and protect the attached pedicle. Add Sutures Used Summary Line?: Yes Validate Referring Provider (Can Hide Referring Provider In Settings Tab): No Surgeon (Optional): Dr. Vitale Spiral Flap Text: The defect edges were debeveled with a #15 scalpel blade.  Given the location of the defect, shape of the defect and the proximity to free margins a spiral flap was deemed most appropriate.  Using a sterile surgical marker, an appropriate rotation flap was drawn incorporating the defect and placing the expected incisions within the relaxed skin tension lines where possible. The area thus outlined was incised deep to adipose tissue with a #15 scalpel blade.  The skin margins were undermined to an appropriate distance in all directions utilizing iris scissors. Elliptical Excision Additional Text (Leave Blank If You Do Not Want): The margin was drawn around the clinically apparent lesion.  An elliptical shape was then drawn on the skin incorporating the lesion and margins.  Incisions were then made along these lines to the appropriate tissue plane and the lesion was extirpated. Melolabial Interpolation Flap Text: A decision was made to reconstruct the defect utilizing an interpolation axial flap and a staged reconstruction.  A telfa template was made of the defect.  This telfa template was then used to outline the melolabial interpolation flap.  The donor area for the pedicle flap was then injected with anesthesia.  The flap was excised through the skin and subcutaneous tissue down to the layer of the underlying musculature.  The pedicle flap was carefully excised within this deep plane to maintain its blood supply.  The edges of the donor site were undermined.   The donor site was closed in a primary fashion.  The pedicle was then rotated into position and sutured.  Once the tube was sutured into place, adequate blood supply was confirmed with blanching and refill.  The pedicle was then wrapped with xeroform gauze and dressed appropriately with a telfa and gauze bandage to ensure continued blood supply and protect the attached pedicle. Bilobed Transposition Flap Text: The defect edges were debeveled with a #15 scalpel blade.  Given the location of the defect and the proximity to free margins a bilobed transposition flap was deemed most appropriate.  Using a sterile surgical marker, an appropriate bilobe flap drawn around the defect.    The area thus outlined was incised deep to adipose tissue with a #15 scalpel blade.  The skin margins were undermined to an appropriate distance in all directions utilizing iris scissors. Lazy S Complex Repair Preamble Text (Leave Blank If You Do Not Want): Extensive wide undermining was performed. Lazy S Intermediate Repair Preamble Text (Leave Blank If You Do Not Want): Undermining was performed with blunt dissection. Billing Type: Third-Party Bill Bi-Rhombic Flap Text: The defect edges were debeveled with a #15 scalpel blade.  Given the location of the defect and the proximity to free margins a bi-rhombic flap was deemed most appropriate.  Using a sterile surgical marker, an appropriate rhombic flap was drawn incorporating the defect. The area thus outlined was incised deep to adipose tissue with a #15 scalpel blade.  The skin margins were undermined to an appropriate distance in all directions utilizing iris scissors. Debridement Text: The wound edges were debrided prior to proceeding with the closure to facilitate wound healing. Repair Type: Complex Epidermal Closure Graft Donor Site (Optional): simple interrupted Double Island Pedicle Flap Text: The defect edges were debeveled with a #15 scalpel blade.  Given the location of the defect, shape of the defect and the proximity to free margins a double island pedicle advancement flap was deemed most appropriate.  Using a sterile surgical marker, an appropriate advancement flap was drawn incorporating the defect, outlining the appropriate donor tissue and placing the expected incisions within the relaxed skin tension lines where possible.    The area thus outlined was incised deep to adipose tissue with a #15 scalpel blade.  The skin margins were undermined to an appropriate distance in all directions around the primary defect and laterally outward around the island pedicle utilizing iris scissors.  There was minimal undermining beneath the pedicle flap. Nasal Turnover Hinge Flap Text: The defect edges were debeveled with a #15 scalpel blade.  Given the size, depth, location of the defect and the defect being full thickness a nasal turnover hinge flap was deemed most appropriate. Using a sterile surgical marker, an appropriate hinge flap was drawn incorporating the defect. The area thus outlined was incised with a #15 scalpel blade. The flap was designed to recreate the nasal mucosal lining and the alar rim. The skin margins were undermined to an appropriate distance in all directions utilizing iris scissors. Following this, the designed flap was carried over into the primary defect and sutured into place Complex Repair And V-Y Plasty Text: The defect edges were debeveled with a #15 scalpel blade.  The primary defect was closed partially with a complex linear closure.  Given the location of the remaining defect, shape of the defect and the proximity to free margins a V-Y plasty was deemed most appropriate for complete closure of the defect.  Using a sterile surgical marker, an appropriate advancement flap was drawn incorporating the defect and placing the expected incisions within the relaxed skin tension lines where possible.    The area thus outlined was incised deep to adipose tissue with a #15 scalpel blade.  The skin margins were undermined to an appropriate distance in all directions utilizing iris scissors. Excisional Biopsy Additional Text (Leave Blank If You Do Not Want): The margin was drawn around the clinically apparent lesion. An elliptical shape was then drawn on the skin incorporating the lesion and margins.  Incisions were then made along these lines to the appropriate tissue plane and the lesion was extirpated. Helical Rim Text: The closure involved the helical rim. Zygomaticofacial Flap Text: Given the location of the defect, shape of the defect and the proximity to free margins a zygomaticofacial flap was deemed most appropriate for repair.  Using a sterile surgical marker, the appropriate flap was drawn incorporating the defect and placing the expected incisions within the relaxed skin tension lines where possible. The area thus outlined was incised deep to adipose tissue with a #15 scalpel blade with preservation of a vascular pedicle.  The skin margins were undermined to an appropriate distance in all directions utilizing iris scissors.  The flap was then placed into the defect and anchored with interrupted buried subcutaneous sutures. Dorsal Nasal Flap Text: The defect edges were debeveled with a #15 scalpel blade.  Given the location of the defect and the proximity to free margins a dorsal nasal flap was deemed most appropriate.  Using a sterile surgical marker, an appropriate dorsal nasal flap was drawn around the defect.    The area thus outlined was incised deep to adipose tissue with a #15 scalpel blade.  The skin margins were undermined to an appropriate distance in all directions utilizing iris scissors. Helical Rim Advancement Flap Text: The defect edges were debeveled with a #15 blade scalpel.  Given the location of the defect and the proximity to free margins (helical rim) a double helical rim advancement flap was deemed most appropriate.  Using a sterile surgical marker, the appropriate advancement flaps were drawn incorporating the defect and placing the expected incisions between the helical rim and antihelix where possible.  The area thus outlined was incised through and through with a #15 scalpel blade.  With a skin hook and iris scissors, the flaps were gently and sharply undermined and freed up. Purse String (Intermediate) Text: Given the location of the defect and the characteristics of the surrounding skin a purse string intermediate closure was deemed most appropriate.  Undermining was performed circumfirentially around the surgical defect.  A purse string suture was then placed and tightened. W Plasty Text: The lesion was extirpated to the level of the fat with a #15 scalpel blade.  Given the location of the defect, shape of the defect and the proximity to free margins a W-plasty was deemed most appropriate for repair.  Using a sterile surgical marker, the appropriate transposition arms of the W-plasty were drawn incorporating the defect and placing the expected incisions within the relaxed skin tension lines where possible.    The area thus outlined was incised deep to adipose tissue with a #15 scalpel blade.  The skin margins were undermined to an appropriate distance in all directions utilizing iris scissors.  The opposing transposition arms were then transposed into place in opposite direction and anchored with interrupted buried subcutaneous sutures. Melolabial Transposition Flap Text: The defect edges were debeveled with a #15 scalpel blade.  Given the location of the defect and the proximity to free margins a melolabial flap was deemed most appropriate.  Using a sterile surgical marker, an appropriate melolabial transposition flap was drawn incorporating the defect.    The area thus outlined was incised deep to adipose tissue with a #15 scalpel blade.  The skin margins were undermined to an appropriate distance in all directions utilizing iris scissors. Dermal Autograft Text: The defect edges were debeveled with a #15 scalpel blade.  Given the location of the defect, shape of the defect and the proximity to free margins a dermal autograft was deemed most appropriate.  Using a sterile surgical marker, the primary defect shape was transferred to the donor site. The area thus outlined was incised deep to adipose tissue with a #15 scalpel blade.  The harvested graft was then trimmed of adipose and epidermal tissue until only dermis was left.  The skin graft was then placed in the primary defect and oriented appropriately. Scalpel Size: 15 blade Nasalis-Muscle-Based Myocutaneous Island Pedicle Flap Text: Using a #15 blade, an incision was made around the donor flap to the level of the nasalis muscle. Wide lateral undermining was then performed in both the subcutaneous plane above the nasalis muscle, and in a submuscular plane just above periosteum. This allowed the formation of a free nasalis muscle axial pedicle (based on the angular artery) which was still attached to the actual cutaneous flap, increasing its mobility and vascular viability. Hemostasis was obtained with pinpoint electrocoagulation. The flap was mobilized into position and the pivotal anchor points positioned and stabilized with buried interrupted sutures. Subcutaneous and dermal tissues were closed in a multilayered fashion with sutures. Tissue redundancies were excised, and the epidermal edges were apposed without significant tension and sutured with sutures. Modified Advancement Flap Text: The defect edges were debeveled with a #15 scalpel blade.  Given the location of the defect, shape of the defect and the proximity to free margins a modified advancement flap was deemed most appropriate.  Using a sterile surgical marker, an appropriate advancement flap was drawn incorporating the defect and placing the expected incisions within the relaxed skin tension lines where possible.    The area thus outlined was incised deep to adipose tissue with a #15 scalpel blade.  The skin margins were undermined to an appropriate distance in all directions utilizing iris scissors. Pre-Excision Curettage Text (Leave Blank If You Do Not Want): Prior to drawing the surgical margin the visible lesion was removed with electrodesiccation and curettage to clearly define the lesion size. Complex Repair And M Plasty Text: The defect edges were debeveled with a #15 scalpel blade.  The primary defect was closed partially with a complex linear closure.  Given the location of the remaining defect, shape of the defect and the proximity to free margins an M plasty was deemed most appropriate for complete closure of the defect.  Using a sterile surgical marker, an appropriate advancement flap was drawn incorporating the defect and placing the expected incisions within the relaxed skin tension lines where possible.    The area thus outlined was incised deep to adipose tissue with a #15 scalpel blade.  The skin margins were undermined to an appropriate distance in all directions utilizing iris scissors. Complex Repair And Split-Thickness Skin Graft Text: The defect edges were debeveled with a #15 scalpel blade.  The primary defect was closed partially with a complex linear closure.  Given the location of the defect, shape of the defect and the proximity to free margins a split thickness skin graft was deemed most appropriate to repair the remaining defect.  The graft was trimmed to fit the size of the remaining defect.  The graft was then placed in the primary defect, oriented appropriately, and sutured into place. V-Y Plasty Text: The defect edges were debeveled with a #15 scalpel blade.  Given the location of the defect, shape of the defect and the proximity to free margins an V-Y advancement flap was deemed most appropriate.  Using a sterile surgical marker, an appropriate advancement flap was drawn incorporating the defect and placing the expected incisions within the relaxed skin tension lines where possible.    The area thus outlined was incised deep to adipose tissue with a #15 scalpel blade.  The skin margins were undermined to an appropriate distance in all directions utilizing iris scissors. Suturegard Intro: Intraoperative tissue expansion was performed, utilizing the SUTUREGARD device, in order to reduce wound tension. Split-Thickness Skin Graft Text: The defect edges were debeveled with a #15 scalpel blade.  Given the location of the defect, shape of the defect and the proximity to free margins a split thickness skin graft was deemed most appropriate.  Using a sterile surgical marker, the primary defect shape was transferred to the donor site. The split thickness graft was then harvested.  The skin graft was then placed in the primary defect and oriented appropriately. Consent was obtained from the patient. The risks and benefits to therapy were discussed in detail. Specifically, the risks of infection, scarring, bleeding, prolonged wound healing, incomplete removal, allergy to anesthesia, nerve injury and recurrence were addressed. Prior to the procedure, the treatment site was clearly identified and confirmed by the patient. All components of Universal Protocol/PAUSE Rule completed. Hemigard Intro: Due to skin fragility and wound tension, it was decided to use HEMIGARD adhesive retention suture devices to permit a linear closure. The skin was cleaned and dried for a 6cm distance away from the wound. Excessive hair, if present, was removed to allow for adhesion. Composite Graft Text: The defect edges were debeveled with a #15 scalpel blade.  Given the location of the defect, shape of the defect, the proximity to free margins and the fact the defect was full thickness a composite graft was deemed most appropriate.  The defect was outline and then transferred to the donor site.  A full thickness graft was then excised from the donor site. The graft was then placed in the primary defect, oriented appropriately and then sutured into place.  The secondary defect was then repaired using a primary closure. Body Location Override (Optional - Billing Will Still Be Based On Selected Body Map Location If Applicable): Right medial forearm Muscle Hinge Flap Text: The defect edges were debeveled with a #15 scalpel blade.  Given the size, depth and location of the defect and the proximity to free margins a muscle hinge flap was deemed most appropriate.  Using a sterile surgical marker, an appropriate hinge flap was drawn incorporating the defect. The area thus outlined was incised with a #15 scalpel blade.  The skin margins were undermined to an appropriate distance in all directions utilizing iris scissors. Double O-Z Flap Text: The defect edges were debeveled with a #15 scalpel blade.  Given the location of the defect, shape of the defect and the proximity to free margins a Double O-Z flap was deemed most appropriate.  Using a sterile surgical marker, an appropriate transposition flap was drawn incorporating the defect and placing the expected incisions within the relaxed skin tension lines where possible. The area thus outlined was incised deep to adipose tissue with a #15 scalpel blade.  The skin margins were undermined to an appropriate distance in all directions utilizing iris scissors. Excision Method: Elliptical H Plasty Text: Given the location of the defect, shape of the defect and the proximity to free margins a H-plasty was deemed most appropriate for repair.  Using a sterile surgical marker, the appropriate advancement arms of the H-plasty were drawn incorporating the defect and placing the expected incisions within the relaxed skin tension lines where possible. The area thus outlined was incised deep to adipose tissue with a #15 scalpel blade. The skin margins were undermined to an appropriate distance in all directions utilizing iris scissors.  The opposing advancement arms were then advanced into place in opposite direction and anchored with interrupted buried subcutaneous sutures. Abbe Flap (Upper To Lower Lip) Text: The defect of the lower lip was assessed and measured.  Given the location and size of the defect, an Abbe flap was deemed most appropriate. Using a sterile surgical marker, an appropriate Abbe flap was measured and drawn on the upper lip. Local anesthesia was then infiltrated.  A scalpel was then used to incise the upper lip through and through the skin, vermilion, muscle and mucosa, leaving the flap pedicled on the opposite side.  The flap was then rotated and transferred to the lower lip defect.  The flap was then sutured into place with a three layer technique, closing the orbicularis oris muscle layer with subcutaneous buried sutures, followed by a mucosal layer and an epidermal layer. Vermilion Border Text: The closure involved the vermilion border. Ear Star Wedge Flap Text: The defect edges were debeveled with a #15 blade scalpel.  Given the location of the defect and the proximity to free margins (helical rim) an ear star wedge flap was deemed most appropriate.  Using a sterile surgical marker, the appropriate flap was drawn incorporating the defect and placing the expected incisions between the helical rim and antihelix where possible.  The area thus outlined was incised through and through with a #15 scalpel blade. Complex Repair And Epidermal Autograft Text: The defect edges were debeveled with a #15 scalpel blade.  The primary defect was closed partially with a complex linear closure.  Given the location of the defect, shape of the defect and the proximity to free margins an epidermal autograft was deemed most appropriate to repair the remaining defect.  The graft was trimmed to fit the size of the remaining defect.  The graft was then placed in the primary defect, oriented appropriately, and sutured into place. Complex Repair And Melolabial Flap Text: The defect edges were debeveled with a #15 scalpel blade.  The primary defect was closed partially with a complex linear closure.  Given the location of the remaining defect, shape of the defect and the proximity to free margins a melolabial flap was deemed most appropriate for complete closure of the defect.  Using a sterile surgical marker, an appropriate advancement flap was drawn incorporating the defect and placing the expected incisions within the relaxed skin tension lines where possible.    The area thus outlined was incised deep to adipose tissue with a #15 scalpel blade.  The skin margins were undermined to an appropriate distance in all directions utilizing iris scissors. Star Wedge Flap Text: The defect edges were debeveled with a #15 scalpel blade.  Given the location of the defect, shape of the defect and the proximity to free margins a star wedge flap was deemed most appropriate.  Using a sterile surgical marker, an appropriate rotation flap was drawn incorporating the defect and placing the expected incisions within the relaxed skin tension lines where possible. The area thus outlined was incised deep to adipose tissue with a #15 scalpel blade.  The skin margins were undermined to an appropriate distance in all directions utilizing iris scissors. O-L Flap Text: The defect edges were debeveled with a #15 scalpel blade.  Given the location of the defect, shape of the defect and the proximity to free margins an O-L flap was deemed most appropriate.  Using a sterile surgical marker, an appropriate advancement flap was drawn incorporating the defect and placing the expected incisions within the relaxed skin tension lines where possible.    The area thus outlined was incised deep to adipose tissue with a #15 scalpel blade.  The skin margins were undermined to an appropriate distance in all directions utilizing iris scissors. Deep Sutures: 3-0 Vicryl Peng Advancement Flap Text: The defect edges were debeveled with a #15 scalpel blade.  Given the location of the defect, shape of the defect and the proximity to free margins a Peng advancement flap was deemed most appropriate.  Using a sterile surgical marker, an appropriate advancement flap was drawn incorporating the defect and placing the expected incisions within the relaxed skin tension lines where possible. The area thus outlined was incised deep to adipose tissue with a #15 scalpel blade.  The skin margins were undermined to an appropriate distance in all directions utilizing iris scissors. Intermediate / Complex Repair - Final Wound Length In Cm: 4 Wound Care: No ointment Corie Ga), Cardiovascular Disease; Internal Medicine; Interventional Cardiology  [address] [facility] [address]  [place] [address]  Phone: [phone]  Fax: [phone]    Bryant Muhammad), Internal Medicine; Nephrology  [address]  [address] [place] [address]  Phone: [phone]  Fax: [phone]    FOLLOW UP WITH PCP,   Phone: (   )    -  Fax: (   )    - Z Plasty Text: The lesion was extirpated to the level of the fat with a #15 scalpel blade.  Given the location of the defect, shape of the defect and the proximity to free margins a Z-plasty was deemed most appropriate for repair.  Using a sterile surgical marker, the appropriate transposition arms of the Z-plasty were drawn incorporating the defect and placing the expected incisions within the relaxed skin tension lines where possible.    The area thus outlined was incised deep to adipose tissue with a #15 scalpel blade.  The skin margins were undermined to an appropriate distance in all directions utilizing iris scissors.  The opposing transposition arms were then transposed into place in opposite direction and anchored with interrupted buried subcutaneous sutures. X Size Of Lesion In Cm (Optional): 0.3 Anesthesia Type: 1% lidocaine with epinephrine Primary Defect Length (In Cm): 0 Complex Repair And W Plasty Text: The defect edges were debeveled with a #15 scalpel blade.  The primary defect was closed partially with a complex linear closure.  Given the location of the remaining defect, shape of the defect and the proximity to free margins a W plasty was deemed most appropriate for complete closure of the defect.  Using a sterile surgical marker, an appropriate advancement flap was drawn incorporating the defect and placing the expected incisions within the relaxed skin tension lines where possible.    The area thus outlined was incised deep to adipose tissue with a #15 scalpel blade.  The skin margins were undermined to an appropriate distance in all directions utilizing iris scissors. Hemigard Retention Suture: 2-0 Nylon Saucerization Excision Additional Text (Leave Blank If You Do Not Want): The margin was drawn around the clinically apparent lesion.  Incisions were then made along these lines, in a tangential fashion, to the appropriate tissue plane and the lesion was extirpated. No Repair - Repaired With Adjacent Surgical Defect Text (Leave Blank If You Do Not Want): After the excision the defect was repaired concurrently with another surgical defect which was in close approximation. Complex Repair And Tissue Cultured Epidermal Autograft Text: The defect edges were debeveled with a #15 scalpel blade.  The primary defect was closed partially with a complex linear closure.  Given the location of the defect, shape of the defect and the proximity to free margins an tissue cultured epidermal autograft was deemed most appropriate to repair the remaining defect.  The graft was trimmed to fit the size of the remaining defect.  The graft was then placed in the primary defect, oriented appropriately, and sutured into place. Complex Repair And Ftsg Text: The defect edges were debeveled with a #15 scalpel blade.  The primary defect was closed partially with a complex linear closure.  Given the location of the defect, shape of the defect and the proximity to free margins a full thickness skin graft was deemed most appropriate to repair the remaining defect.  The graft was trimmed to fit the size of the remaining defect.  The graft was then placed in the primary defect, oriented appropriately, and sutured into place. O-T Plasty Text: The defect edges were debeveled with a #15 scalpel blade.  Given the location of the defect, shape of the defect and the proximity to free margins an O-T plasty was deemed most appropriate.  Using a sterile surgical marker, an appropriate O-T plasty was drawn incorporating the defect and placing the expected incisions within the relaxed skin tension lines where possible.    The area thus outlined was incised deep to adipose tissue with a #15 scalpel blade.  The skin margins were undermined to an appropriate distance in all directions utilizing iris scissors. Suturegard Body: The suture ends were repeatedly re-tightened and re-clamped to achieve the desired tissue expansion. Complex Repair And Z Plasty Text: The defect edges were debeveled with a #15 scalpel blade.  The primary defect was closed partially with a complex linear closure.  Given the location of the remaining defect, shape of the defect and the proximity to free margins a Z plasty was deemed most appropriate for complete closure of the defect.  Using a sterile surgical marker, an appropriate advancement flap was drawn incorporating the defect and placing the expected incisions within the relaxed skin tension lines where possible.    The area thus outlined was incised deep to adipose tissue with a #15 scalpel blade.  The skin margins were undermined to an appropriate distance in all directions utilizing iris scissors. Complex Repair And Single Advancement Flap Text: The defect edges were debeveled with a #15 scalpel blade.  The primary defect was closed partially with a complex linear closure.  Given the location of the remaining defect, shape of the defect and the proximity to free margins a single advancement flap was deemed most appropriate for complete closure of the defect.  Using a sterile surgical marker, an appropriate advancement flap was drawn incorporating the defect and placing the expected incisions within the relaxed skin tension lines where possible.    The area thus outlined was incised deep to adipose tissue with a #15 scalpel blade.  The skin margins were undermined to an appropriate distance in all directions utilizing iris scissors. Advancement Flap (Double) Text: The defect edges were debeveled with a #15 scalpel blade.  Given the location of the defect and the proximity to free margins a double advancement flap was deemed most appropriate.  Using a sterile surgical marker, the appropriate advancement flaps were drawn incorporating the defect and placing the expected incisions within the relaxed skin tension lines where possible.    The area thus outlined was incised deep to adipose tissue with a #15 scalpel blade.  The skin margins were undermined to an appropriate distance in all directions utilizing iris scissors. Graft Donor Site Bandage (Optional-Leave Blank If You Don't Want In Note): Steri-strips and a pressure bandage were applied to the donor site. Adjacent Tissue Transfer Text: The defect edges were debeveled with a #15 scalpel blade. Given the location of the defect and the proximity to free margins an adjacent tissue transfer was deemed most appropriate. Using a sterile surgical marker, an appropriate flap was drawn incorporating the defect and placing the expected incisions within the relaxed skin tension lines where possible. The area thus outlined was incised deep to adipose tissue with a #15 scalpel blade. The skin margins were undermined to an appropriate distance in all directions utilizing iris scissors and carried over to close the primary defect. O-Z Plasty Text: The defect edges were debeveled with a #15 scalpel blade.  Given the location of the defect, shape of the defect and the proximity to free margins an O-Z plasty (double transposition flap) was deemed most appropriate.  Using a sterile surgical marker, the appropriate transposition flaps were drawn incorporating the defect and placing the expected incisions within the relaxed skin tension lines where possible.    The area thus outlined was incised deep to adipose tissue with a #15 scalpel blade.  The skin margins were undermined to an appropriate distance in all directions utilizing iris scissors.  Hemostasis was achieved with electrocautery.  The flaps were then transposed into place, one clockwise and the other counterclockwise, and anchored with interrupted buried subcutaneous sutures. Undermining Type: Entire Wound Lip Wedge Excision Repair Text: Given the location of the defect and the proximity to free margins a full thickness wedge repair was deemed most appropriate.  Using a sterile surgical marker, the appropriate repair was drawn incorporating the defect and placing the expected incisions perpendicular to the vermilion border.  The vermilion border was also meticulously outlined to ensure appropriate reapproximation during the repair.  The area thus outlined was incised through and through with a #15 scalpel blade.  The muscularis and dermis were reaproximated with deep sutures following hemostasis. Care was taken to realign the vermilion border before proceeding with the superficial closure.  Once the vermilion was realigned the superfical and mucosal closure was finished. Complex Repair And O-T Advancement Flap Text: The defect edges were debeveled with a #15 scalpel blade.  The primary defect was closed partially with a complex linear closure.  Given the location of the remaining defect, shape of the defect and the proximity to free margins an O-T advancement flap was deemed most appropriate for complete closure of the defect.  Using a sterile surgical marker, an appropriate advancement flap was drawn incorporating the defect and placing the expected incisions within the relaxed skin tension lines where possible.    The area thus outlined was incised deep to adipose tissue with a #15 scalpel blade.  The skin margins were undermined to an appropriate distance in all directions utilizing iris scissors. Mastoid Interpolation Flap Text: A decision was made to reconstruct the defect utilizing an interpolation axial flap and a staged reconstruction.  A telfa template was made of the defect.  This telfa template was then used to outline the mastoid interpolation flap.  The donor area for the pedicle flap was then injected with anesthesia.  The flap was excised through the skin and subcutaneous tissue down to the layer of the underlying musculature.  The pedicle flap was carefully excised within this deep plane to maintain its blood supply.  The edges of the donor site were undermined.   The donor site was closed in a primary fashion.  The pedicle was then rotated into position and sutured.  Once the tube was sutured into place, adequate blood supply was confirmed with blanching and refill.  The pedicle was then wrapped with xeroform gauze and dressed appropriately with a telfa and gauze bandage to ensure continued blood supply and protect the attached pedicle. Rhombic Flap Text: The defect edges were debeveled with a #15 scalpel blade.  Given the location of the defect and the proximity to free margins a rhombic flap was deemed most appropriate.  Using a sterile surgical marker, an appropriate rhombic flap was drawn incorporating the defect.    The area thus outlined was incised deep to adipose tissue with a #15 scalpel blade.  The skin margins were undermined to an appropriate distance in all directions utilizing iris scissors. Size Of Lesion In Cm: 0.5 Post-Care Instructions: I reviewed with the patient in detail post-care instructions. Patient is not to engage in any heavy lifting, exercise, or swimming for the next 14 days. Should the patient develop any fevers, chills, bleeding, severe pain patient will contact the office immediately. Skin Substitute Text: The defect edges were debeveled with a #15 scalpel blade.  Given the location of the defect, shape of the defect and the proximity to free margins a skin substitute graft was deemed most appropriate.  The graft material was trimmed to fit the size of the defect. The graft was then placed in the primary defect and oriented appropriately. Mucosal Advancement Flap Text: Given the location of the defect, shape of the defect and the proximity to free margins a mucosal advancement flap was deemed most appropriate. Incisions were made with a 15 blade scalpel in the appropriate fashion along the cutaneous vermilion border and the mucosal lip. The remaining actinically damaged mucosal tissue was excised.  The mucosal advancement flap was then elevated to the gingival sulcus with care taken to preserve the neurovascular structures and advanced into the primary defect. Care was taken to ensure that precise realignment of the vermilion border was achieved. Orbicularis Oris Muscle Flap Text: The defect edges were debeveled with a #15 scalpel blade.  Given that the defect affected the competency of the oral sphincter an orbicularis oris muscle flap was deemed most appropriate to restore this competency and normal muscle function.  Using a sterile surgical marker, an appropriate flap was drawn incorporating the defect. The area thus outlined was incised with a #15 scalpel blade. Following this, the designed flap was carried over into the primary defect and sutured into place. Complex Repair And Double M Plasty Text: The defect edges were debeveled with a #15 scalpel blade.  The primary defect was closed partially with a complex linear closure.  Given the location of the remaining defect, shape of the defect and the proximity to free margins a double M plasty was deemed most appropriate for complete closure of the defect.  Using a sterile surgical marker, an appropriate advancement flap was drawn incorporating the defect and placing the expected incisions within the relaxed skin tension lines where possible.    The area thus outlined was incised deep to adipose tissue with a #15 scalpel blade.  The skin margins were undermined to an appropriate distance in all directions utilizing iris scissors. Complex Repair And O-L Flap Text: The defect edges were debeveled with a #15 scalpel blade.  The primary defect was closed partially with a complex linear closure.  Given the location of the remaining defect, shape of the defect and the proximity to free margins an O-L flap was deemed most appropriate for complete closure of the defect.  Using a sterile surgical marker, an appropriate flap was drawn incorporating the defect and placing the expected incisions within the relaxed skin tension lines where possible.    The area thus outlined was incised deep to adipose tissue with a #15 scalpel blade.  The skin margins were undermined to an appropriate distance in all directions utilizing iris scissors. Cartilage Graft Text: The defect edges were debeveled with a #15 scalpel blade.  Given the location of the defect, shape of the defect, the fact the defect involved a full thickness cartilage defect a cartilage graft was deemed most appropriate.  An appropriate donor site was identified, cleansed, and anesthetized. The cartilage graft was then harvested and transferred to the recipient site, oriented appropriately and then sutured into place.  The secondary defect was then repaired using a primary closure. Retention Suture Bite Size: 3 mm Estlander Flap (Lower To Upper Lip) Text: The defect of the lower lip was assessed and measured.  Given the location and size of the defect, an Estlander flap was deemed most appropriate. Using a sterile surgical marker, an appropriate Estlander flap was measured and drawn on the upper lip. Local anesthesia was then infiltrated. A scalpel was then used to incise the lateral aspect of the flap, through skin, muscle and mucosa, leaving the flap pedicled medially.  The flap was then rotated and positioned to fill the lower lip defect.  The flap was then sutured into place with a three layer technique, closing the orbicularis oris muscle layer with subcutaneous buried sutures, followed by a mucosal layer and an epidermal layer. Rotation Flap Text: The defect edges were debeveled with a #15 scalpel blade.  Given the location of the defect, shape of the defect and the proximity to free margins a rotation flap was deemed most appropriate.  Using a sterile surgical marker, an appropriate rotation flap was drawn incorporating the defect and placing the expected incisions within the relaxed skin tension lines where possible.    The area thus outlined was incised deep to adipose tissue with a #15 scalpel blade.  The skin margins were undermined to an appropriate distance in all directions utilizing iris scissors. V-Y Flap Text: The defect edges were debeveled with a #15 scalpel blade.  Given the location of the defect, shape of the defect and the proximity to free margins a V-Y flap was deemed most appropriate.  Using a sterile surgical marker, an appropriate advancement flap was drawn incorporating the defect and placing the expected incisions within the relaxed skin tension lines where possible.    The area thus outlined was incised deep to adipose tissue with a #15 scalpel blade.  The skin margins were undermined to an appropriate distance in all directions utilizing iris scissors. Mustarde Flap Text: The defect edges were debeveled with a #15 scalpel blade.  Given the size, depth and location of the defect and the proximity to free margins a Mustarde flap was deemed most appropriate. Using a sterile surgical marker, an appropriate flap was drawn incorporating the defect. The area thus outlined was incised with a #15 scalpel blade. The skin margins were undermined to an appropriate distance in all directions utilizing iris scissors. Following this, the designed flap was carried into the primary defect and sutured into place. Dressing: dry sterile dressing Abbe Flap (Lower To Upper Lip) Text: The defect of the upper lip was assessed and measured.  Given the location and size of the defect, an Abbe flap was deemed most appropriate. Using a sterile surgical marker, an appropriate Abbe flap was measured and drawn on the lower lip. Local anesthesia was then infiltrated. A scalpel was then used to incise the upper lip through and through the skin, vermilion, muscle and mucosa, leaving the flap pedicled on the opposite side.  The flap was then rotated and transferred to the lower lip defect.  The flap was then sutured into place with a three layer technique, closing the orbicularis oris muscle layer with subcutaneous buried sutures, followed by a mucosal layer and an epidermal layer. Eye Clamp Note Details: An eye clamp was used during the procedure. Cheek Interpolation Flap Text: A decision was made to reconstruct the defect utilizing an interpolation axial flap and a staged reconstruction.  A telfa template was made of the defect.  This telfa template was then used to outline the Cheek Interpolation flap.  The donor area for the pedicle flap was then injected with anesthesia.  The flap was excised through the skin and subcutaneous tissue down to the layer of the underlying musculature.  The interpolation flap was carefully excised within this deep plane to maintain its blood supply.  The edges of the donor site were undermined.   The donor site was closed in a primary fashion.  The pedicle was then rotated into position and sutured.  Once the tube was sutured into place, adequate blood supply was confirmed with blanching and refill.  The pedicle was then wrapped with xeroform gauze and dressed appropriately with a telfa and gauze bandage to ensure continued blood supply and protect the attached pedicle. Length To Time In Minutes Device Was In Place: 10 Bilateral Helical Rim Advancement Flap Text: The defect edges were debeveled with a #15 blade scalpel.  Given the location of the defect and the proximity to free margins (helical rim) a bilateral helical rim advancement flap was deemed most appropriate.  Using a sterile surgical marker, the appropriate advancement flaps were drawn incorporating the defect and placing the expected incisions between the helical rim and antihelix where possible.  The area thus outlined was incised through and through with a #15 scalpel blade.  With a skin hook and iris scissors, the flaps were gently and sharply undermined and freed up. Island Pedicle Flap Text: The defect edges were debeveled with a #15 scalpel blade.  Given the location of the defect, shape of the defect and the proximity to free margins an island pedicle advancement flap was deemed most appropriate.  Using a sterile surgical marker, an appropriate advancement flap was drawn incorporating the defect, outlining the appropriate donor tissue and placing the expected incisions within the relaxed skin tension lines where possible.    The area thus outlined was incised deep to adipose tissue with a #15 scalpel blade.  The skin margins were undermined to an appropriate distance in all directions around the primary defect and laterally outward around the island pedicle utilizing iris scissors.  There was minimal undermining beneath the pedicle flap. Chonodrocutaneous Helical Advancement Flap Text: The defect edges were debeveled with a #15 scalpel blade.  Given the location of the defect and the proximity to free margins a chondrocutaneous helical advancement flap was deemed most appropriate.  Using a sterile surgical marker, the appropriate advancement flap was drawn incorporating the defect and placing the expected incisions within the relaxed skin tension lines where possible.    The area thus outlined was incised deep to adipose tissue with a #15 scalpel blade.  The skin margins were undermined to an appropriate distance in all directions utilizing iris scissors. Epidermal Sutures: GluSeal Burow's Graft Text: The defect edges were debeveled with a #15 scalpel blade.  Given the location of the defect, shape of the defect, the proximity to free margins and the presence of a standing cone deformity a Burow's skin graft was deemed most appropriate. The standing cone was removed and this tissue was then trimmed to the shape of the primary defect. The adipose tissue was also removed until only dermis and epidermis were left.  The skin margins of the secondary defect were undermined to an appropriate distance in all directions utilizing iris scissors.  The secondary defect was closed with interrupted buried subcutaneous sutures.  The skin edges were then re-apposed with running  sutures.  The skin graft was then placed in the primary defect and oriented appropriately. Complex Repair And Rotation Flap Text: The defect edges were debeveled with a #15 scalpel blade.  The primary defect was closed partially with a complex linear closure.  Given the location of the remaining defect, shape of the defect and the proximity to free margins a rotation flap was deemed most appropriate for complete closure of the defect.  Using a sterile surgical marker, an appropriate advancement flap was drawn incorporating the defect and placing the expected incisions within the relaxed skin tension lines where possible.    The area thus outlined was incised deep to adipose tissue with a #15 scalpel blade.  The skin margins were undermined to an appropriate distance in all directions utilizing iris scissors. Retention Suture Text: Retention sutures were placed to support the closure and prevent dehiscence. O-T Advancement Flap Text: The defect edges were debeveled with a #15 scalpel blade.  Given the location of the defect, shape of the defect and the proximity to free margins an O-T advancement flap was deemed most appropriate.  Using a sterile surgical marker, an appropriate advancement flap was drawn incorporating the defect and placing the expected incisions within the relaxed skin tension lines where possible.    The area thus outlined was incised deep to adipose tissue with a #15 scalpel blade.  The skin margins were undermined to an appropriate distance in all directions utilizing iris scissors. Staged Advancement Flap Text: The defect edges were debeveled with a #15 scalpel blade. Given the location of the defect, shape of the defect and the proximity to free margins a staged advancement flap was deemed most appropriate. Using a sterile surgical marker, an appropriate advancement flap was drawn incorporating the defect and placing the expected incisions within the relaxed skin tension lines where possible. The area thus outlined was incised deep to adipose tissue with a #15 scalpel blade. The skin margins were undermined to an appropriate distance in all directions utilizing iris scissors. Following this, the designed flap was carried over into the primary defect and sutured into place. Where Do You Want The Question To Include Opioid Counseling Located?: Case Summary Tab Complex Repair And Modified Advancement Flap Text: The defect edges were debeveled with a #15 scalpel blade.  The primary defect was closed partially with a complex linear closure.  Given the location of the remaining defect, shape of the defect and the proximity to free margins a modified advancement flap was deemed most appropriate for complete closure of the defect.  Using a sterile surgical marker, an appropriate advancement flap was drawn incorporating the defect and placing the expected incisions within the relaxed skin tension lines where possible.    The area thus outlined was incised deep to adipose tissue with a #15 scalpel blade.  The skin margins were undermined to an appropriate distance in all directions utilizing iris scissors. Bilobed Flap Text: The defect edges were debeveled with a #15 scalpel blade.  Given the location of the defect and the proximity to free margins a bilobe flap was deemed most appropriate.  Using a sterile surgical marker, an appropriate bilobe flap drawn around the defect.    The area thus outlined was incised deep to adipose tissue with a #15 scalpel blade.  The skin margins were undermined to an appropriate distance in all directions utilizing iris scissors. Hatchet Flap Text: The defect edges were debeveled with a #15 scalpel blade.  Given the location of the defect, shape of the defect and the proximity to free margins a hatchet flap was deemed most appropriate.  Using a sterile surgical marker, an appropriate hatchet flap was drawn incorporating the defect and placing the expected incisions within the relaxed skin tension lines where possible.    The area thus outlined was incised deep to adipose tissue with a #15 scalpel blade.  The skin margins were undermined to an appropriate distance in all directions utilizing iris scissors. Information: Selecting Yes will display possible errors in your note based on the variables you have selected. This validation is only offered as a suggestion for you. PLEASE NOTE THAT THE VALIDATION TEXT WILL BE REMOVED WHEN YOU FINALIZE YOUR NOTE. IF YOU WANT TO FAX A PRELIMINARY NOTE YOU WILL NEED TO TOGGLE THIS TO 'NO' IF YOU DO NOT WANT IT IN YOUR FAXED NOTE. Number Of Hemigard Strips Per Side: 1 Complex Repair And Rhombic Flap Text: The defect edges were debeveled with a #15 scalpel blade.  The primary defect was closed partially with a complex linear closure.  Given the location of the remaining defect, shape of the defect and the proximity to free margins a rhombic flap was deemed most appropriate for complete closure of the defect.  Using a sterile surgical marker, an appropriate advancement flap was drawn incorporating the defect and placing the expected incisions within the relaxed skin tension lines where possible.    The area thus outlined was incised deep to adipose tissue with a #15 scalpel blade.  The skin margins were undermined to an appropriate distance in all directions utilizing iris scissors. Burow's Advancement Flap Text: The defect edges were debeveled with a #15 scalpel blade.  Given the location of the defect and the proximity to free margins a Burow's advancement flap was deemed most appropriate.  Using a sterile surgical marker, the appropriate advancement flap was drawn incorporating the defect and placing the expected incisions within the relaxed skin tension lines where possible.    The area thus outlined was incised deep to adipose tissue with a #15 scalpel blade.  The skin margins were undermined to an appropriate distance in all directions utilizing iris scissors. Hemostasis: Electrocautery Pinch Graft Text: The defect edges were debeveled with a #15 scalpel blade. Given the location of the defect, shape of the defect and the proximity to free margins a pinch graft was deemed most appropriate. Using a sterile surgical marker, the primary defect shape was transferred to the donor site. The area thus outlined was incised deep to adipose tissue with a #15 scalpel blade.  The harvested graft was then trimmed of adipose tissue until only dermis and epidermis was left. The skin margins of the secondary defect were undermined to an appropriate distance in all directions utilizing iris scissors.  The secondary defect was closed with interrupted buried subcutaneous sutures.  The skin edges were then re-apposed with running  sutures.  The skin graft was then placed in the primary defect and oriented appropriately. Complex Repair And Xenograft Text: The defect edges were debeveled with a #15 scalpel blade.  The primary defect was closed partially with a complex linear closure.  Given the location of the defect, shape of the defect and the proximity to free margins a xenograft was deemed most appropriate to repair the remaining defect.  The graft was trimmed to fit the size of the remaining defect.  The graft was then placed in the primary defect, oriented appropriately, and sutured into place. Repair Performed By Another Provider Text (Leave Blank If You Do Not Want): After the tissue was excised the defect was repaired by another provider. Complex Repair And Dermal Autograft Text: The defect edges were debeveled with a #15 scalpel blade.  The primary defect was closed partially with a complex linear closure.  Given the location of the defect, shape of the defect and the proximity to free margins an dermal autograft was deemed most appropriate to repair the remaining defect.  The graft was trimmed to fit the size of the remaining defect.  The graft was then placed in the primary defect, oriented appropriately, and sutured into place. Complex Repair And Burow's Graft Text: The defect edges were debeveled with a #15 scalpel blade.  The primary defect was closed partially with a complex linear closure.  Given the location of the defect, shape of the defect, the proximity to free margins and the presence of a standing cone deformity a Burow's graft was deemed most appropriate to repair the remaining defect.  The graft was trimmed to fit the size of the remaining defect.  The graft was then placed in the primary defect, oriented appropriately, and sutured into place. Alar Island Pedicle Flap Text: The defect edges were debeveled with a #15 scalpel blade.  Given the location of the defect, shape of the defect and the proximity to the alar rim an island pedicle advancement flap was deemed most appropriate.  Using a sterile surgical marker, an appropriate advancement flap was drawn incorporating the defect, outlining the appropriate donor tissue and placing the expected incisions within the nasal ala running parallel to the alar rim. The area thus outlined was incised with a #15 scalpel blade.  The skin margins were undermined minimally to an appropriate distance in all directions around the primary defect and laterally outward around the island pedicle utilizing iris scissors.  There was minimal undermining beneath the pedicle flap. Referring Physician (Optional): Dr. Cronin Trilobed Flap Text: The defect edges were debeveled with a #15 scalpel blade.  Given the location of the defect and the proximity to free margins a trilobed flap was deemed most appropriate.  Using a sterile surgical marker, an appropriate trilobed flap drawn around the defect.    The area thus outlined was incised deep to adipose tissue with a #15 scalpel blade.  The skin margins were undermined to an appropriate distance in all directions utilizing iris scissors. Complex Repair And Dorsal Nasal Flap Text: The defect edges were debeveled with a #15 scalpel blade.  The primary defect was closed partially with a complex linear closure.  Given the location of the remaining defect, shape of the defect and the proximity to free margins a dorsal nasal flap was deemed most appropriate for complete closure of the defect.  Using a sterile surgical marker, an appropriate flap was drawn incorporating the defect and placing the expected incisions within the relaxed skin tension lines where possible.    The area thus outlined was incised deep to adipose tissue with a #15 scalpel blade.  The skin margins were undermined to an appropriate distance in all directions utilizing iris scissors. Xenograft Text: The defect edges were debeveled with a #15 scalpel blade.  Given the location of the defect, shape of the defect and the proximity to free margins a xenograft was deemed most appropriate.  The graft was then trimmed to fit the size of the defect.  The graft was then placed in the primary defect and oriented appropriately. Path Notes (To The Dermatopathologist): Please check margins. Curvilinear Excision Additional Text (Leave Blank If You Do Not Want): The margin was drawn around the clinically apparent lesion.  A curvilinear shape was then drawn on the skin incorporating the lesion and margins.  Incisions were then made along these lines to the appropriate tissue plane and the lesion was extirpated. Keystone Flap Text: The defect edges were debeveled with a #15 scalpel blade.  Given the location of the defect, shape of the defect a keystone flap was deemed most appropriate.  Using a sterile surgical marker, an appropriate keystone flap was drawn incorporating the defect, outlining the appropriate donor tissue and placing the expected incisions within the relaxed skin tension lines where possible. The area thus outlined was incised deep to adipose tissue with a #15 scalpel blade.  The skin margins were undermined to an appropriate distance in all directions around the primary defect and laterally outward around the flap utilizing iris scissors. Cheek-To-Nose Interpolation Flap Text: A decision was made to reconstruct the defect utilizing an interpolation axial flap and a staged reconstruction.  A telfa template was made of the defect.  This telfa template was then used to outline the Cheek-To-Nose Interpolation flap.  The donor area for the pedicle flap was then injected with anesthesia.  The flap was excised through the skin and subcutaneous tissue down to the layer of the underlying musculature.  The interpolation flap was carefully excised within this deep plane to maintain its blood supply.  The edges of the donor site were undermined.   The donor site was closed in a primary fashion.  The pedicle was then rotated into position and sutured.  Once the tube was sutured into place, adequate blood supply was confirmed with blanching and refill.  The pedicle was then wrapped with xeroform gauze and dressed appropriately with a telfa and gauze bandage to ensure continued blood supply and protect the attached pedicle. Complex Repair And Double Advancement Flap Text: The defect edges were debeveled with a #15 scalpel blade.  The primary defect was closed partially with a complex linear closure.  Given the location of the remaining defect, shape of the defect and the proximity to free margins a double advancement flap was deemed most appropriate for complete closure of the defect.  Using a sterile surgical marker, an appropriate advancement flap was drawn incorporating the defect and placing the expected incisions within the relaxed skin tension lines where possible.    The area thus outlined was incised deep to adipose tissue with a #15 scalpel blade.  The skin margins were undermined to an appropriate distance in all directions utilizing iris scissors. Excision Depth: adipose tissue Advancement Flap (Single) Text: The defect edges were debeveled with a #15 scalpel blade.  Given the location of the defect and the proximity to free margins a single advancement flap was deemed most appropriate.  Using a sterile surgical marker, an appropriate advancement flap was drawn incorporating the defect and placing the expected incisions within the relaxed skin tension lines where possible.    The area thus outlined was incised deep to adipose tissue with a #15 scalpel blade.  The skin margins were undermined to an appropriate distance in all directions utilizing iris scissors. Double O-Z Plasty Text: The defect edges were debeveled with a #15 scalpel blade.  Given the location of the defect, shape of the defect and the proximity to free margins a Double O-Z plasty (double transposition flap) was deemed most appropriate.  Using a sterile surgical marker, the appropriate transposition flaps were drawn incorporating the defect and placing the expected incisions within the relaxed skin tension lines where possible. The area thus outlined was incised deep to adipose tissue with a #15 scalpel blade.  The skin margins were undermined to an appropriate distance in all directions utilizing iris scissors.  Hemostasis was achieved with electrocautery.  The flaps were then transposed into place, one clockwise and the other counterclockwise, and anchored with interrupted buried subcutaneous sutures. Crescentic Advancement Flap Text: The defect edges were debeveled with a #15 scalpel blade.  Given the location of the defect and the proximity to free margins a crescentic advancement flap was deemed most appropriate.  Using a sterile surgical marker, the appropriate advancement flap was drawn incorporating the defect and placing the expected incisions within the relaxed skin tension lines where possible.    The area thus outlined was incised deep to adipose tissue with a #15 scalpel blade.  The skin margins were undermined to an appropriate distance in all directions utilizing iris scissors. Estimated Blood Loss (Cc): minimal Positioning (Leave Blank If You Do Not Want): The patient was placed in a comfortable position exposing the surgical site. Rhomboid Transposition Flap Text: The defect edges were debeveled with a #15 scalpel blade.  Given the location of the defect and the proximity to free margins a rhomboid transposition flap was deemed most appropriate.  Using a sterile surgical marker, an appropriate rhomboid flap was drawn incorporating the defect.    The area thus outlined was incised deep to adipose tissue with a #15 scalpel blade.  The skin margins were undermined to an appropriate distance in all directions utilizing iris scissors. Home Suture Removal Text: Patient was provided a home suture removal kit and will remove their sutures at home.  If they have any questions or difficulties they will call the office. Tissue Cultured Epidermal Autograft Text: The defect edges were debeveled with a #15 scalpel blade.  Given the location of the defect, shape of the defect and the proximity to free margins a tissue cultured epidermal autograft was deemed most appropriate.  The graft was then trimmed to fit the size of the defect.  The graft was then placed in the primary defect and oriented appropriately. Island Pedicle Flap-Requiring Vessel Identification Text: The defect edges were debeveled with a #15 scalpel blade.  Given the location of the defect, shape of the defect and the proximity to free margins an island pedicle advancement flap was deemed most appropriate.  Using a sterile surgical marker, an appropriate advancement flap was drawn, based on the axial vessel mentioned above, incorporating the defect, outlining the appropriate donor tissue and placing the expected incisions within the relaxed skin tension lines where possible.    The area thus outlined was incised deep to adipose tissue with a #15 scalpel blade.  The skin margins were undermined to an appropriate distance in all directions around the primary defect and laterally outward around the island pedicle utilizing iris scissors.  There was minimal undermining beneath the pedicle flap. Slit Excision Additional Text (Leave Blank If You Do Not Want): A linear line was drawn on the skin overlying the lesion. An incision was made slowly until the lesion was visualized.  Once visualized, the lesion was removed with blunt dissection. A-T Advancement Flap Text: The defect edges were debeveled with a #15 scalpel blade.  Given the location of the defect, shape of the defect and the proximity to free margins an A-T advancement flap was deemed most appropriate.  Using a sterile surgical marker, an appropriate advancement flap was drawn incorporating the defect and placing the expected incisions within the relaxed skin tension lines where possible.    The area thus outlined was incised deep to adipose tissue with a #15 scalpel blade.  The skin margins were undermined to an appropriate distance in all directions utilizing iris scissors. Transposition Flap Text: The defect edges were debeveled with a #15 scalpel blade.  Given the location of the defect and the proximity to free margins a transposition flap was deemed most appropriate.  Using a sterile surgical marker, an appropriate transposition flap was drawn incorporating the defect.    The area thus outlined was incised deep to adipose tissue with a #15 scalpel blade.  The skin margins were undermined to an appropriate distance in all directions utilizing iris scissors. O-Z Flap Text: The defect edges were debeveled with a #15 scalpel blade.  Given the location of the defect, shape of the defect and the proximity to free margins an O-Z flap was deemed most appropriate.  Using a sterile surgical marker, an appropriate transposition flap was drawn incorporating the defect and placing the expected incisions within the relaxed skin tension lines where possible. The area thus outlined was incised deep to adipose tissue with a #15 scalpel blade.  The skin margins were undermined to an appropriate distance in all directions utilizing iris scissors. Hemigard Postcare Instructions: The HEMIGARD strips are to remain completely dry for at least 5-7 days. Complex Repair And Bilobe Flap Text: The defect edges were debeveled with a #15 scalpel blade.  The primary defect was closed partially with a complex linear closure.  Given the location of the remaining defect, shape of the defect and the proximity to free margins a bilobe flap was deemed most appropriate for complete closure of the defect.  Using a sterile surgical marker, an appropriate advancement flap was drawn incorporating the defect and placing the expected incisions within the relaxed skin tension lines where possible.    The area thus outlined was incised deep to adipose tissue with a #15 scalpel blade.  The skin margins were undermined to an appropriate distance in all directions utilizing iris scissors. Purse String (Simple) Text: Given the location of the defect and the characteristics of the surrounding skin a purse string simple closure was deemed most appropriate.  Undermining was performed circumferentially around the surgical defect.  A purse string suture was then placed and tightened. Advancement-Rotation Flap Text: The defect edges were debeveled with a #15 scalpel blade.  Given the location of the defect, shape of the defect and the proximity to free margins an advancement-rotation flap was deemed most appropriate.  Using a sterile surgical marker, an appropriate flap was drawn incorporating the defect and placing the expected incisions within the relaxed skin tension lines where possible. The area thus outlined was incised deep to adipose tissue with a #15 scalpel blade.  The skin margins were undermined to an appropriate distance in all directions utilizing iris scissors. Bilateral Rotation Flap Text: The defect edges were debeveled with a #15 scalpel blade. Given the location of the defect, shape of the defect and the proximity to free margins a bilateral rotation flap was deemed most appropriate. Using a sterile surgical marker, an appropriate rotation flap was drawn incorporating the defect and placing the expected incisions within the relaxed skin tension lines where possible. The area thus outlined was incised deep to adipose tissue with a #15 scalpel blade. The skin margins were undermined to an appropriate distance in all directions utilizing iris scissors. Following this, the designed flap was carried over into the primary defect and sutured into place. Epidermal Autograft Text: The defect edges were debeveled with a #15 scalpel blade.  Given the location of the defect, shape of the defect and the proximity to free margins an epidermal autograft was deemed most appropriate.  Using a sterile surgical marker, the primary defect shape was transferred to the donor site. The epidermal graft was then harvested.  The skin graft was then placed in the primary defect and oriented appropriately. Posterior Auricular Interpolation Flap Text: A decision was made to reconstruct the defect utilizing an interpolation axial flap and a staged reconstruction.  A telfa template was made of the defect.  This telfa template was then used to outline the posterior auricular interpolation flap.  The donor area for the pedicle flap was then injected with anesthesia.  The flap was excised through the skin and subcutaneous tissue down to the layer of the underlying musculature.  The pedicle flap was carefully excised within this deep plane to maintain its blood supply.  The edges of the donor site were undermined.   The donor site was closed in a primary fashion.  The pedicle was then rotated into position and sutured.  Once the tube was sutured into place, adequate blood supply was confirmed with blanching and refill.  The pedicle was then wrapped with xeroform gauze and dressed appropriately with a telfa and gauze bandage to ensure continued blood supply and protect the attached pedicle. Distance Of Undermining In Cm (Required): 2.6 Partial Purse String (Intermediate) Text: Given the location of the defect and the characteristics of the surrounding skin an intermediate purse string closure was deemed most appropriate.  Undermining was performed circumferentially around the surgical defect.  A purse string suture was then placed and tightened. Wound tension of the circular defect prevented complete closure of the wound. Ftsg Text: The defect edges were debeveled with a #15 scalpel blade.  Given the location of the defect, shape of the defect and the proximity to free margins a full thickness skin graft was deemed most appropriate.  Using a sterile surgical marker, the primary defect shape was transferred to the donor site. The area thus outlined was incised deep to adipose tissue with a #15 scalpel blade.  The harvested graft was then trimmed of adipose tissue until only dermis and epidermis was left.  The skin margins of the secondary defect were undermined to an appropriate distance in all directions utilizing iris scissors.  The secondary defect was closed with interrupted buried subcutaneous sutures.  The skin edges were then re-apposed with running  sutures.  The skin graft was then placed in the primary defect and oriented appropriately. Mercedes Flap Text: The defect edges were debeveled with a #15 scalpel blade.  Given the location of the defect, shape of the defect and the proximity to free margins a Mercedes flap was deemed most appropriate.  Using a sterile surgical marker, an appropriate advancement flap was drawn incorporating the defect and placing the expected incisions within the relaxed skin tension lines where possible. The area thus outlined was incised deep to adipose tissue with a #15 scalpel blade.  The skin margins were undermined to an appropriate distance in all directions utilizing iris scissors. Complex Repair And A-T Advancement Flap Text: The defect edges were debeveled with a #15 scalpel blade.  The primary defect was closed partially with a complex linear closure.  Given the location of the remaining defect, shape of the defect and the proximity to free margins an A-T advancement flap was deemed most appropriate for complete closure of the defect.  Using a sterile surgical marker, an appropriate advancement flap was drawn incorporating the defect and placing the expected incisions within the relaxed skin tension lines where possible.    The area thus outlined was incised deep to adipose tissue with a #15 scalpel blade.  The skin margins were undermined to an appropriate distance in all directions utilizing iris scissors. Saucerization Depth: dermis and superficial adipose tissue Banner Transposition Flap Text: The defect edges were debeveled with a #15 scalpel blade.  Given the location of the defect and the proximity to free margins a Banner transposition flap was deemed most appropriate.  Using a sterile surgical marker, an appropriate flap drawn around the defect. The area thus outlined was incised deep to adipose tissue with a #15 scalpel blade.  The skin margins were undermined to an appropriate distance in all directions utilizing iris scissors. Perilesional Excision Additional Text (Leave Blank If You Do Not Want): The margin was drawn around the clinically apparent lesion. Incisions were then made along these lines to the appropriate tissue plane and the lesion was extirpated. Paramedian Forehead Flap Text: A decision was made to reconstruct the defect utilizing an interpolation axial flap and a staged reconstruction.  A telfa template was made of the defect.  This telfa template was then used to outline the paramedian forehead pedicle flap.  The donor area for the pedicle flap was then injected with anesthesia.  The flap was excised through the skin and subcutaneous tissue down to the layer of the underlying musculature.  The pedicle flap was carefully excised within this deep plane to maintain its blood supply.  The edges of the donor site were undermined.   The donor site was closed in a primary fashion.  The pedicle was then rotated into position and sutured.  Once the tube was sutured into place, adequate blood supply was confirmed with blanching and refill.  The pedicle was then wrapped with xeroform gauze and dressed appropriately with a telfa and gauze bandage to ensure continued blood supply and protect the attached pedicle. Complex Repair And Transposition Flap Text: The defect edges were debeveled with a #15 scalpel blade.  The primary defect was closed partially with a complex linear closure.  Given the location of the remaining defect, shape of the defect and the proximity to free margins a transposition flap was deemed most appropriate for complete closure of the defect.  Using a sterile surgical marker, an appropriate advancement flap was drawn incorporating the defect and placing the expected incisions within the relaxed skin tension lines where possible.    The area thus outlined was incised deep to adipose tissue with a #15 scalpel blade.  The skin margins were undermined to an appropriate distance in all directions utilizing iris scissors. Island Pedicle Flap With Canthal Suspension Text: The defect edges were debeveled with a #15 scalpel blade.  Given the location of the defect, shape of the defect and the proximity to free margins an island pedicle advancement flap was deemed most appropriate.  Using a sterile surgical marker, an appropriate advancement flap was drawn incorporating the defect, outlining the appropriate donor tissue and placing the expected incisions within the relaxed skin tension lines where possible. The area thus outlined was incised deep to adipose tissue with a #15 scalpel blade.  The skin margins were undermined to an appropriate distance in all directions around the primary defect and laterally outward around the island pedicle utilizing iris scissors.  There was minimal undermining beneath the pedicle flap. A suspension suture was placed in the canthal tendon to prevent tension and prevent ectropion. Partial Purse String (Simple) Text: Given the location of the defect and the characteristics of the surrounding skin a simple purse string closure was deemed most appropriate.  Undermining was performed circumferentially around the surgical defect.  A purse string suture was then placed and tightened. Wound tension of the circular defect prevented complete closure of the wound. Additional Anesthesia Volume In Cc: 6 Double Z Plasty Text: The lesion was extirpated to the level of the fat with a #15 scalpel blade. Given the location of the defect, shape of the defect and the proximity to free margins a double Z-plasty was deemed most appropriate for repair. Using a sterile surgical marker, the appropriate transposition arms of the double Z-plasty were drawn incorporating the defect and placing the expected incisions within the relaxed skin tension lines where possible. The area thus outlined was incised deep to adipose tissue with a #15 scalpel blade. The skin margins were undermined to an appropriate distance in all directions utilizing iris scissors. The opposing transposition arms were then transposed and carried over into place in opposite direction and anchored with interrupted buried subcutaneous sutures. Complex Repair And Skin Substitute Graft Text: The defect edges were debeveled with a #15 scalpel blade.  The primary defect was closed partially with a complex linear closure.  Given the location of the remaining defect, shape of the defect and the proximity to free margins a skin substitute graft was deemed most appropriate to repair the remaining defect.  The graft was trimmed to fit the size of the remaining defect.  The graft was then placed in the primary defect, oriented appropriately, and sutured into place. Width Of Defect Perpendicular To Closure In Cm (Required): 1.3 Detail Level: Detailed Nostril Rim Text: The closure involved the nostril rim.

## 2024-01-31 NOTE — ED ADULT NURSE NOTE - CAS TRG GENERAL AIRWAY, MLM
Patent Detail Level: Zone Comment: Most recent: L upper back ED&C 7/2023\\nPrevious R temple and R&L cheeks per pt Render Risk Assessment In Note?: no

## 2024-03-06 NOTE — PROGRESS NOTE ADULT - PROBLEM SELECTOR PROBLEM 4
[INTERVAL HX: ]  Patient seen and examined;  Chart reviewed and events noted;     C/o incisional pain  DC planned tomorrow.     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   Recommend outpt discussion regarding chemotherapy and +/- radiation oncology evaluation    Pt current without outpt insurance.    helping with navigating complexities of getting needed care post DC.     discussed with pt and sister PE and risk for recurrent sx, death from subsequent PE.   Discussed importance of continuation of AC with Eliquis.   Questions and concerns addressed    [MEDICATIONS]  MEDICATIONS  (STANDING):  amLODIPine   Tablet 10 milliGRAM(s) Oral daily  apixaban 10 milliGRAM(s) Oral every 12 hours  budesonide 160 MICROgram(s)/formoterol 4.5 MICROgram(s) Inhaler 2 Puff(s) Inhalation two times a day  dextrose 5%. 1000 milliLiter(s) (70 mL/Hr) IV Continuous <Continuous>  influenza  Vaccine (HIGH DOSE) 0.7 milliLiter(s) IntraMuscular once  pantoprazole    Tablet 40 milliGRAM(s) Oral before breakfast  tiotropium 2.5 MICROgram(s) Inhaler 2 Puff(s) Inhalation daily    MEDICATIONS  (PRN):  acetaminophen     Tablet .. 650 milliGRAM(s) Oral every 6 hours PRN Mild Pain (1 - 3), Moderate Pain (4 - 6)  albuterol    90 MICROgram(s) HFA Inhaler 2 Puff(s) Inhalation every 6 hours PRN Shortness of Breath and/or Wheezing  ALPRAZolam 0.25 milliGRAM(s) Oral daily PRN for anxiety      [VITALS]  Vital Signs Last 24 Hrs  T(C): 37.2 (06 Mar 2024 13:46), Max: 37.2 (06 Mar 2024 13:46)  T(F): 98.9 (06 Mar 2024 13:46), Max: 98.9 (06 Mar 2024 13:46)  HR: 100 (06 Mar 2024 13:46) (88 - 114)  BP: 122/67 (06 Mar 2024 13:46) (119/73 - 155/79)  BP(mean): --  RR: 19 (06 Mar 2024 13:46) (17 - 20)  SpO2: 89% (06 Mar 2024 13:46) (86% - 99%)    Parameters below as of 06 Mar 2024 13:46  Patient On (Oxygen Delivery Method): room air      [WT/HT]  Daily     Daily Weight in k.4 (06 Mar 2024 04:41)  [VENT]      [PHYSICAL EXAM]  GEN: NAD  HEENT: normocephalic and atraumatic. EOMI. PERRL.    NECK: Supple.  No lymphadenopathy   LUNGS: Clear to auscultation.  HEART: Regular rate and rhythm,  no MRG  ABDOMEN: Soft, nontender, and nondistended.  Positive bowel sounds.    : No CVA tenderness  EXTREMITIES: Without edema.  NEUROLOGIC: grossly intact.  PSYCHIATRIC: Appropriate affect .  SKIN: No rash     [LABS:]                        8.8    5.13  )-----------( 293      ( 06 Mar 2024 04:50 )             30.2     03-06    147<H>  |  106  |  5<L>  ----------------------------<  104<H>  4.1   |  37<H>  |  0.72    Ca    9.9      06 Mar 2024 04:50  Phos  3.0     03-06  Mg     1.8     03-06    TPro  5.6<L>  /  Alb  2.5<L>  /  TBili  0.4  /  DBili  x   /  AST  9<L>  /  ALT  11<L>  /  AlkPhos  42  03-05    PT/INR - ( 06 Mar 2024 04:50 )   PT: 17.5 sec;   INR: 1.51 ratio         PTT - ( 06 Mar 2024 11:35 )  PTT:44.0 sec      Reticulocyte Count (24 @ 06:45)  Reticulocyte Percent: 2.5 % [0.5 - 2.5]  Absolute Reticulocytes: 106.8 K/uL [25.0 - 125.0]    Iron - Total Binding Capacity.: 278 ug/dL [220 - 430] (24 @ 06:25)    Ferritin: 32 ng/mL [13 - 330] (24 @ 06:25)      Urinalysis Basic - ( 06 Mar 2024 04:50 )    Color: x / Appearance: x / SG: x / pH: x  Gluc: 104 mg/dL / Ketone: x  / Bili: x / Urobili: x   Blood: x / Protein: x / Nitrite: x   Leuk Esterase: x / RBC: x / WBC x   Sq Epi: x / Non Sq Epi: x / Bacteria: x        SARS-CoV-2: NotDetec (2024 14:17)  SARS-CoV-2: NotDetec (2024 01:10)  SARS-CoV-2: NotDetec (2024 10:48)    PATHOLOGY    ACCESSION No:  [id number]  Patient:     KENDALL RIDER  Accession:                             [id number]  Collected Date/Time:                   2024 15:00 EST  Received Date/Time:                    3/1/2024 06:44 EST  Surgical Pathology Report - Auth (Verified)  Specimen(s) Submitted  1  Sigmoid colon upper rectum  2  Anastomotic donut  Final Diagnosis  Status post 803  1.  Sigmoid colon upper rectum:  - Invasive adenocarcinoma, moderately differentiated, high nuclear grade, 5.5 x 5.0 cm, infiltrating muscular coat with involvement of subserosal adipose tissue.  See note.  - Diverticulosis.  - Metastatic adenocarcinoma to 5/18 lymph nodes with complete replacement of 2 nodes.  Note: Proximal, distal and mesenteric surgical margins appear free of tumor.  Tumor is noted <1 high-power field from the serosal surface.  2.  Anastomotic donuts:  - Colonic wall rings, 2, showing no significant pathological changes.  Verified by: Awa Shepherd MD (Electronic Signature)  Reported on: 24 10:33 EST, [address]  Phone: [phone]   Fax: [phone]  _________________________________________________________________  Comment: Case referred to Tumor Registry.  Synoptic Summary  1: Colon and Rectum - Resection  Specimen  Procedure:  Sigmoidectomy with upper rectum  Macroscopic Evaluation of Mesorectum:    Near complete    Tumor  Tumor Site:  Sigmoid colon  Histologic Type:   Adenocarcinoma  Histologic Grade:   G2, moderately differentiated  Tumor Size:  5.5 Centimeters (cm)  Tumor Extent:   Invades through muscularis propria into the pericolonic or perirectal tissue  Macroscopic Tumor Perforation:   Not identified  Lymphovascular Invasion:   Not identified  Perineural Invasion:   Not identified  Treatment Effect:   No known presurgical therapy    Margins  Margin Status for Invasive Carcinoma:    All margins negative for invasive carcinoma  Distance from Invasive Carcinoma to Radial (Circumferential) Margin:  6.0 cm  Distance from Invasive Carcinoma to Closest Mucosal Margin: 2.5 cm  Margin Status for Non-Invasive Tumor:    Not applicable    Regional Lymph Nodes  Regional Lymph Node Status:   Tumor present in regional lymph node(s) 5   Number of Lymph Nodes with Tumor: 5  Number of Lymph Nodes Examined:   18  Tumor Deposits:   Not identified    Pathologic Stage Classification (pTNM, AJCC 8th Edition)  pT Category:   pT3  pN Category:   pN2a  Comments  MMR(Mismatch Repair) results on report # 30-S-.  Best Tumor Blocks for Future Studies  Tumor Block(s):   1D, 1E  CAP eCC  Q2 Release    Clinical Information  Procedure: Lap. sigmoid resection    Perioperative Diagnosis: Sigmoid colon carcinoma  Postoperative Diagnosis: Same    Gross Description  1.  Received: In formalin labeled  "sigmoid colon upper rectum"  Integrity:  Intact  Orientation:  None provided    Colon  Length:  16.3 cm  Colonic Circumference:   2.2 up to 5.5 cm  Mesenteric Width:  7.0 cm  Mesorectal Width:  7.0 cm  Wall Thickness:  0.2, 0.3 cm    Inking  One End:  Blue  Serosa:  Green  Opposing End:  Orange  Mesenteric Resection Margin:   Black    Area of Interest -  mass  Size:  5.5 cm in length x 5.0 cm in width, tan-pink polypoid mass  Location:  Anterior wall, posterior wall  Anterior Peritoneal Reflection:    Not applicable  Posterior Peritoneal Reflection:    Not applicable  % Circumferential Involvement:   90%  Overlying Serosa:  Contracted  Cut Surface:  White and firm  Extension:  Extends through wall 0.6 cm into the mesenteric adipose tissue.  Distance to Margin at One End:   8.3 cm  Distance to Margin at Opposing End:   2.5 cm  Distance to Additional Margins/Landmarks  Posterior Mesenteric:  6.0 cm  Remaining Mucosa:  The mucosa adjacent to the mass shows blue discoloration. The remaining mucosa shows multiple deep flask shaped diverticula, some impacted with fecal content  Remaining Serosa:  Pink-tan, smooth and unremarkable  Lumen:  Focally narrowed  Attached Mesentery:  Yellow, soft and intact  Mesorectal Margin:  Not Present  Possible Lymph Node(s):   Multiple , 0.2 to 1.2 cm in greatest dimension  Cut surfaces:  White-yellow and granular  Submitted:  Representative sections in 19 cassettes:  1A: Margin of resection from one end, perpendicular (inked blue)  1B: Margin of resection from opposite end, perpendicular (inked orange)  1C-1F: Full cross section of mass and adjacent mucosa including contracted serosa  1G: Mass and serosa  1H-1J: Full cross section of mass with deepest involvement of nory-colonic adipose tissue  1K: Diverticula  1L-1M: Trisected lymph node  1N: Trisected lymph node  1O: Bisected lymph node  1P: Bisected lymph node  1Q: Bisected lymph node  1R-1S: Multiple lymph nodes, each section represents 1 lymph node  2.  Received: In formalin labeled  "anastomotic donut" , consisting of an anastomotic pin with two attached rings of soft tissue. The mucosa is tan-brown and focally hemorrhagic. The sutures and staples are removed.  The soft tissue is inked and sectioned.  Dimensions:  5.5 x 2.5 x 0.5 cm - anastomotic pin; 1.5 and 1.7 cm in diameter - rings of soft tissue  Submitted:  Representative sections in one cassette: 2A  ALESIA Stewart ([facility]) 2024 07:40 AM    ACCESSION No: [id number]   Patient: KENDALL RIDER   Accession: [id number]   Collected Date/Time: 2024 11:03 EST   Received Date/Time: 2024 13:11 EST   Addendum Report - Auth (Verified)   Addendum   This addendum is to report results of Mismatch Repair (MMR) by IHC performed and interpreted at [facility].   MISMATCH REPAIR (MMR) BY IHC   Interpretation: No loss of nuclear expression of MMR proteins: Low probability of MSI-H.   Results:   MLH1: Intact nuclear expression   MSH2: Intact nuclear expression   MSH6: Intact nuclear expression   PMS2: Intact nuclear expression   Please see complete report verified by Dr. Yuridia Mcdonald MD, on 24.   Report faxed to Dr. Hon Foreman's office on 24.   Verified by: Barrington Hernandez MD       [RADIOLOGY STUDIES:]     [INTERVAL HX: ]  Patient seen and examined;  Chart reviewed and events noted;     C/o incisional pain  DC planned tomorrow.     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   Recommend outpt discussion regarding chemotherapy and +/- radiation oncology evaluation    Pt current without outpt insurance.    helping with navigating complexities of getting needed care post DC.     discussed with pt and sister PE and risk for recurrent sx, death from subsequent PE.   Discussed importance of continuation of AC with Eliquis. [Original Eliquis, then changed to plan for Coumadin yesterday, then subsequent change back to Eliquis today based on family decision prior to even my evaluation and discussion with pt/family].  Questions and concerns addressed    [MEDICATIONS]  MEDICATIONS  (STANDING):  amLODIPine   Tablet 10 milliGRAM(s) Oral daily  apixaban 10 milliGRAM(s) Oral every 12 hours  budesonide 160 MICROgram(s)/formoterol 4.5 MICROgram(s) Inhaler 2 Puff(s) Inhalation two times a day  dextrose 5%. 1000 milliLiter(s) (70 mL/Hr) IV Continuous <Continuous>  influenza  Vaccine (HIGH DOSE) 0.7 milliLiter(s) IntraMuscular once  pantoprazole    Tablet 40 milliGRAM(s) Oral before breakfast  tiotropium 2.5 MICROgram(s) Inhaler 2 Puff(s) Inhalation daily    MEDICATIONS  (PRN):  acetaminophen     Tablet .. 650 milliGRAM(s) Oral every 6 hours PRN Mild Pain (1 - 3), Moderate Pain (4 - 6)  albuterol    90 MICROgram(s) HFA Inhaler 2 Puff(s) Inhalation every 6 hours PRN Shortness of Breath and/or Wheezing  ALPRAZolam 0.25 milliGRAM(s) Oral daily PRN for anxiety      [VITALS]  Vital Signs Last 24 Hrs  T(C): 37.2 (06 Mar 2024 13:46), Max: 37.2 (06 Mar 2024 13:46)  T(F): 98.9 (06 Mar 2024 13:46), Max: 98.9 (06 Mar 2024 13:46)  HR: 100 (06 Mar 2024 13:46) (88 - 114)  BP: 122/67 (06 Mar 2024 13:46) (119/73 - 155/79)  BP(mean): --  RR: 19 (06 Mar 2024 13:46) (17 - 20)  SpO2: 89% (06 Mar 2024 13:46) (86% - 99%)    Parameters below as of 06 Mar 2024 13:46  Patient On (Oxygen Delivery Method): room air      [WT/HT]  Daily     Daily Weight in k.4 (06 Mar 2024 04:41)  [VENT]      [PHYSICAL EXAM]  GEN: NAD  HEENT: normocephalic and atraumatic. EOMI. PERRL.    NECK: Supple.  No lymphadenopathy   LUNGS: Clear to auscultation.  HEART: Regular rate and rhythm,  no MRG  ABDOMEN: Soft, nontender, and nondistended.  Positive bowel sounds.    : No CVA tenderness  EXTREMITIES: Without edema.  NEUROLOGIC: grossly intact.  PSYCHIATRIC: Appropriate affect .  SKIN: No rash     [LABS:]                        8.8    5.13  )-----------( 293      ( 06 Mar 2024 04:50 )             30.2     03-06    147<H>  |  106  |  5<L>  ----------------------------<  104<H>  4.1   |  37<H>  |  0.72    Ca    9.9      06 Mar 2024 04:50  Phos  3.0     03-06  Mg     1.8     03-06    TPro  5.6<L>  /  Alb  2.5<L>  /  TBili  0.4  /  DBili  x   /  AST  9<L>  /  ALT  11<L>  /  AlkPhos  42  03-05    PT/INR - ( 06 Mar 2024 04:50 )   PT: 17.5 sec;   INR: 1.51 ratio         PTT - ( 06 Mar 2024 11:35 )  PTT:44.0 sec      Reticulocyte Count (24 @ 06:45)  Reticulocyte Percent: 2.5 % [0.5 - 2.5]  Absolute Reticulocytes: 106.8 K/uL [25.0 - 125.0]    Iron - Total Binding Capacity.: 278 ug/dL [220 - 430] (24 @ 06:25)    Ferritin: 32 ng/mL [13 - 330] (24 @ 06:25)      Urinalysis Basic - ( 06 Mar 2024 04:50 )    Color: x / Appearance: x / SG: x / pH: x  Gluc: 104 mg/dL / Ketone: x  / Bili: x / Urobili: x   Blood: x / Protein: x / Nitrite: x   Leuk Esterase: x / RBC: x / WBC x   Sq Epi: x / Non Sq Epi: x / Bacteria: x        SARS-CoV-2: NotDetec (2024 14:17)  SARS-CoV-2: NotDetec (2024 01:10)  SARS-CoV-2: NotDetec (2024 10:48)    PATHOLOGY    ACCESSION No:  [id number]  Patient:     KENDALL RIDER  Accession:                             [id number]  Collected Date/Time:                   2024 15:00 EST  Received Date/Time:                    3/1/2024 06:44 EST  Surgical Pathology Report - Auth (Verified)  Specimen(s) Submitted  1  Sigmoid colon upper rectum  2  Anastomotic donut  Final Diagnosis  Status post 803  1.  Sigmoid colon upper rectum:  - Invasive adenocarcinoma, moderately differentiated, high nuclear grade, 5.5 x 5.0 cm, infiltrating muscular coat with involvement of subserosal adipose tissue.  See note.  - Diverticulosis.  - Metastatic adenocarcinoma to 5/18 lymph nodes with complete replacement of 2 nodes.  Note: Proximal, distal and mesenteric surgical margins appear free of tumor.  Tumor is noted <1 high-power field from the serosal surface.  2.  Anastomotic donuts:  - Colonic wall rings, 2, showing no significant pathological changes.  Verified by: Awa Shepherd MD (Electronic Signature)  Reported on: 24 10:33 EST, [address]  Phone: [phone]   Fax: [phone]  _________________________________________________________________  Comment: Case referred to Tumor Registry.  Synoptic Summary  1: Colon and Rectum - Resection  Specimen  Procedure:  Sigmoidectomy with upper rectum  Macroscopic Evaluation of Mesorectum:    Near complete    Tumor  Tumor Site:  Sigmoid colon  Histologic Type:   Adenocarcinoma  Histologic Grade:   G2, moderately differentiated  Tumor Size:  5.5 Centimeters (cm)  Tumor Extent:   Invades through muscularis propria into the pericolonic or perirectal tissue  Macroscopic Tumor Perforation:   Not identified  Lymphovascular Invasion:   Not identified  Perineural Invasion:   Not identified  Treatment Effect:   No known presurgical therapy    Margins  Margin Status for Invasive Carcinoma:    All margins negative for invasive carcinoma  Distance from Invasive Carcinoma to Radial (Circumferential) Margin:  6.0 cm  Distance from Invasive Carcinoma to Closest Mucosal Margin: 2.5 cm  Margin Status for Non-Invasive Tumor:    Not applicable    Regional Lymph Nodes  Regional Lymph Node Status:   Tumor present in regional lymph node(s) 5   Number of Lymph Nodes with Tumor: 5  Number of Lymph Nodes Examined:   18  Tumor Deposits:   Not identified    Pathologic Stage Classification (pTNM, AJCC 8th Edition)  pT Category:   pT3  pN Category:   pN2a  Comments  MMR(Mismatch Repair) results on report # 30-S-.  Best Tumor Blocks for Future Studies  Tumor Block(s):   1D, 1E  CAP eCC  Q2 Release    Clinical Information  Procedure: Lap. sigmoid resection    Perioperative Diagnosis: Sigmoid colon carcinoma  Postoperative Diagnosis: Same    Gross Description  1.  Received: In formalin labeled  "sigmoid colon upper rectum"  Integrity:  Intact  Orientation:  None provided    Colon  Length:  16.3 cm  Colonic Circumference:   2.2 up to 5.5 cm  Mesenteric Width:  7.0 cm  Mesorectal Width:  7.0 cm  Wall Thickness:  0.2, 0.3 cm    Inking  One End:  Blue  Serosa:  Green  Opposing End:  Orange  Mesenteric Resection Margin:   Black    Area of Interest -  mass  Size:  5.5 cm in length x 5.0 cm in width, tan-pink polypoid mass  Location:  Anterior wall, posterior wall  Anterior Peritoneal Reflection:    Not applicable  Posterior Peritoneal Reflection:    Not applicable  % Circumferential Involvement:   90%  Overlying Serosa:  Contracted  Cut Surface:  White and firm  Extension:  Extends through wall 0.6 cm into the mesenteric adipose tissue.  Distance to Margin at One End:   8.3 cm  Distance to Margin at Opposing End:   2.5 cm  Distance to Additional Margins/Landmarks  Posterior Mesenteric:  6.0 cm  Remaining Mucosa:  The mucosa adjacent to the mass shows blue discoloration. The remaining mucosa shows multiple deep flask shaped diverticula, some impacted with fecal content  Remaining Serosa:  Pink-tan, smooth and unremarkable  Lumen:  Focally narrowed  Attached Mesentery:  Yellow, soft and intact  Mesorectal Margin:  Not Present  Possible Lymph Node(s):   Multiple , 0.2 to 1.2 cm in greatest dimension  Cut surfaces:  White-yellow and granular  Submitted:  Representative sections in 19 cassettes:  1A: Margin of resection from one end, perpendicular (inked blue)  1B: Margin of resection from opposite end, perpendicular (inked orange)  1C-1F: Full cross section of mass and adjacent mucosa including contracted serosa  1G: Mass and serosa  1H-1J: Full cross section of mass with deepest involvement of nory-colonic adipose tissue  1K: Diverticula  1L-1M: Trisected lymph node  1N: Trisected lymph node  1O: Bisected lymph node  1P: Bisected lymph node  1Q: Bisected lymph node  1R-1S: Multiple lymph nodes, each section represents 1 lymph node  2.  Received: In formalin labeled  "anastomotic donut" , consisting of an anastomotic pin with two attached rings of soft tissue. The mucosa is tan-brown and focally hemorrhagic. The sutures and staples are removed.  The soft tissue is inked and sectioned.  Dimensions:  5.5 x 2.5 x 0.5 cm - anastomotic pin; 1.5 and 1.7 cm in diameter - rings of soft tissue  Submitted:  Representative sections in one cassette: 2A  ALESIA Stewart ([facility]) 2024 07:40 AM    ACCESSION No: 30 [id number]   Patient: KENDALL RIDER   Accession: [id number]   Collected Date/Time: 2024 11:03 EST   Received Date/Time: 2024 13:11 EST   Addendum Report - Auth (Verified)   Addendum   This addendum is to report results of Mismatch Repair (MMR) by IHC performed and interpreted at [facility].   MISMATCH REPAIR (MMR) BY IHC   Interpretation: No loss of nuclear expression of MMR proteins: Low probability of MSI-H.   Results:   MLH1: Intact nuclear expression   MSH2: Intact nuclear expression   MSH6: Intact nuclear expression   PMS2: Intact nuclear expression   Please see complete report verified by Dr. Yuridia Mcdonald MD, on 24.   Report faxed to Dr. Hon Foreman's office on 24.   Verified by: Barrington Hernandez MD       [RADIOLOGY STUDIES:]     H/O pulmonary hypertension

## 2024-03-21 NOTE — PROGRESS NOTE ADULT - PROBLEM/PLAN-3
DISPLAY PLAN FREE TEXT
1
DISPLAY PLAN FREE TEXT

## 2024-03-22 NOTE — PROGRESS NOTE ADULT - ASSESSMENT
Pt is a 74 yo woman with PMHx of HTN, T2DM, CAD s/p CABG (LIMA to LAD, SVG to OM, SVG to PDA 2014 at [facility]), non-dilated ICM (EF 20-25%), severe mitral regurgitation s/p mitral clip (6/13/19 Dr. Newman), severe pulm HTN, CKD, hypothyroidism admitted with worsening SOB.    A/P:  MERVIN  Likely sec to cardiogenic shock  Renal function has improved to its baseline  Pt currently on lasix 60 IV BID and remains on oral Spironolactone   Continue diuretic therapy per cardiology.  Optimize glucose control  Monitor renal function   Avoid further nephrotoxics, NSAIDS, RCA    CKD stage 4:  baseline Scr 1.8-2.0. Scr stable at 1.88 today.   Renal function fluctuates sec to CHF  Monitor renal function at present    SOB:  in setting of HF. Improving on diuretic therapy.   Continue diuretics per cardiology    Acidosis   Sec to Renal failure  Improved and is currently at goal   Monitor serum Co2 at present    Hypokalemia:  in the setting of diuretic therapy.   Serum potassium better after repletion       Anemia:   Hb stable  Pt with iron deficiency anemia. 100% of the time/able to follow multistep instructions

## 2024-04-22 NOTE — PROGRESS NOTE ADULT - SUBJECTIVE AND OBJECTIVE BOX
[facility]   MD RAHEEL SHAH MD ANGELA WONG, PA    TEL:  OFFICE: [phone]  DR SANTOYO CELL: [phone]  DR. NGUYEN CELL: [phone]  UMM KENDALL CELL: [phone]        Patient is a 72y old  Female who presents with a chief complaint of pino (07 Nov 2018 07:53)      Patient seen and examined at bedside. post cath, + chest pain/sob    VITALS:  T(F): 97.4 (11-07-18 @ 12:37), Max: 98 (11-06-18 @ 14:34)  HR: 69 (11-07-18 @ 12:37)  BP: 98/55 (11-07-18 @ 12:37)  RR: 17 (11-07-18 @ 12:37)  SpO2: 100% (11-07-18 @ 12:37)  Wt(kg): --    11-06 @ 07:01  -  11-07 @ 07:00  --------------------------------------------------------  IN: 240 mL / OUT: 900 mL / NET: -660 mL    11-07 @ 07:01  -  11-07 @ 13:13  --------------------------------------------------------  IN: 0 mL / OUT: 600 mL / NET: -600 mL          PHYSICAL EXAM:  Constitutional: NAD  Neck: No JVD  Respiratory: CTAB, no wheezes, rales or rhonchi  Cardiovascular: S1, S2, RRR  Gastrointestinal: BS+, soft, NT/ND  Extremities: No peripheral edema    Hospital Medications:   MEDICATIONS  (STANDING):  aspirin enteric coated 81 milliGRAM(s) Oral at bedtime  atorvastatin 40 milliGRAM(s) Oral at bedtime  dextrose 5%. 1000 milliLiter(s) (50 mL/Hr) IV Continuous <Continuous>  dextrose 50% Injectable 12.5 Gram(s) IV Push once  dextrose 50% Injectable 25 Gram(s) IV Push once  dextrose 50% Injectable 25 Gram(s) IV Push once  ferrous    sulfate 325 milliGRAM(s) Oral daily  furosemide    Tablet 40 milliGRAM(s) Oral every 12 hours  heparin  Injectable 5000 Unit(s) SubCutaneous every 12 hours  influenza   Vaccine 0.5 milliLiter(s) IntraMuscular once  insulin glargine Injectable (LANTUS) 65 Unit(s) SubCutaneous at bedtime  insulin lispro Injectable (HumaLOG) 30 Unit(s) SubCutaneous three times a day before meals  levothyroxine 50 MICROGram(s) Oral daily  metoprolol tartrate 25 milliGRAM(s) Oral two times a day  montelukast 10 milliGRAM(s) Oral at bedtime  pantoprazole    Tablet 40 milliGRAM(s) Oral before breakfast  sodium chloride 0.9% lock flush 3 milliLiter(s) IV Push every 8 hours  ticagrelor 90 milliGRAM(s) Oral two times a day      LABS:  11-07    143  |  109<H>  |  39<H>  ----------------------------<  53<L>  4.5   |  21<L>  |  1.76<H>    Ca    9.0      07 Nov 2018 06:40  Mg     2.3     11-06      Creatinine Trend: 1.76 <--, 2.05 <--, 2.11 <--, 1.93 <--, 1.82 <--, 1.50 <--                                10.5   7.78  )-----------( 243      ( 07 Nov 2018 06:40 )             32.7     Urine Studies:      Iron 40, TIBC 377, %sat --      [01-20-18 @ 06:06]  Ferritin 38.35      [01-20-18 @ 06:06]  PTH 43.55 (Ca --)      [09-10-18 @ 07:15]   --  PTH 36.60 (Ca --)      [09-08-18 @ 03:15]   --  Vitamin D (25OH) 15.8      [09-08-18 @ 03:15]  HbA1c 7.8      [11-02-18 @ 12:00]  TSH 2.03      [09-25-18 @ 04:20]  Lipid: chol 132, , HDL 31, LDL 78      [09-25-18 @ 04:20]        RADIOLOGY & ADDITIONAL STUDIES: no

## 2024-05-27 NOTE — PHYSICAL THERAPY INITIAL EVALUATION ADULT - IMPAIRMENTS FOUND, PT EVAL
Real Young(Attending)
aerobic capacity/endurance/muscle strength/gait, locomotion, and balance
gait, locomotion, and balance/aerobic capacity/endurance/muscle strength

## 2024-06-04 NOTE — PROGRESS NOTE ADULT - PROBLEM SELECTOR PLAN 2
NURSING ASSESSMENT     Date: 6/4/2024        Patient Name: Kelvin Washington     YOB: 1953      Age:  70 y.o.      MRN: [id number]       Chaperone [] Yes   [x] No      Advance Directives:   Do you currently have completed advance directives (living will)? [] Yes   [x] No         *If yes, please bring us a copy for your records.  *If no, would you like info or assistance in completing advance directives (living will)?   [] Yes   [x] No    Pain Score:   Pain Score (1-10): 4-5  Pain Location: all joints are aching , left shoulder, right knee and back are the worst  Pain Duration: always some degree  Pain Management/Control: norco, tylenol, diclofenac gel      Is pain affecting your ability to take care of yourself or move throughout your home?                        [x] Yes   [] No    General: Weakness and Fatigue, has no energy, strength has decreased, has trouble opening a gallon of milk now  Patient has gained weight [] Yes   [x] No  Patient has lost weight [] Yes   [x] No  How much weight in pounds and over what length of time:     Eyes (Ophthalmic): wears glasses, baseline     Skin (Dermatological): No Problems     ENT: tongue still feels swollen on right side, occasional sore throat, very little taste, dry mouth, still has trouble swallowing certain foods, has dentures but wears them very occasionally     Respiratory: pt states he has a new nodule in his LLL, Dr Thomas is following, has a cough at night occasionally productive of clear to tan phlegm     Cardiovascular: No Problems      Device   [] Yes   [x] No   Copy of Card Obtained [] Yes   [x] No    Gastrointestinal: pt moves bowels every AM, no pain, bleeding or mucous     Genito-Urinary: IPSS 3-4, nocturia, has stress incontinence and wears a pad     Breast: No Problems     Musculoskeletal: all joints ache, feels weak, was getting a lot of cramping in muscles but that has decreased lately    Neurological: No Problems      Hematological and  - c/w  Dilaudid 0.5mg IVP q6 ATC   - c/w Dilaudid 0.5mg IV q1 prn dyspnea, required x2 in last 24 hours  - c/w bowel regimen while patient is on opioids, last BM this AM

## 2024-06-13 NOTE — PROGRESS NOTE ADULT - SUBJECTIVE AND OBJECTIVE BOX
CHIEF COMPLAINT: patient laying in the bed awake to verbal complain of fatigue event of a  RRT noted  Endo follow-up and renal follow-up appreciated waiting for official cardiology consult  SUBJECTIVE:     REVIEW OF SYSTEMS:    CONSTITUTIONAL: (X  )  weakness,  (  ) fevers or chills  EYES/ENT: (  )visual changes;     NECK: (  ) pain or stiffness  RESPIRATORY:   (  )cough, wheezing, hemoptysis;  ( X ) shortness of breath  CARDIOVASCULAR:  (  )chest pain or palpitations  GASTROINTESTINAL:   (  )abdominal or epigastric pain.  (  ) nausea, vomiting, or hematemesis;   (   ) diarrhea or constipation.   GENITOURINARY:   (    ) dysuria, frequency or hematuria  NEUROLOGICAL:  (   ) numbness or weakness   All other review of systems is negative unless indicated above    Vital Signs Last 24 Hrs  T(C): 36.7 (27 Jul 2019 18:10), Max: 37 (27 Jul 2019 08:24)  T(F): 98.1 (27 Jul 2019 18:10), Max: 98.6 (27 Jul 2019 08:24)  HR: 74 (27 Jul 2019 18:10) (65 - 142)  BP: 103/57 (27 Jul 2019 18:10) (96/57 - 129/63)  BP(mean): --  RR: 20 (27 Jul 2019 18:10) (18 - 24)  SpO2: 100% (27 Jul 2019 18:10) (97% - 100%)    I&O's Summary    26 Jul 2019 07:01  -  27 Jul 2019 07:00  --------------------------------------------------------  IN: 1005 mL / OUT: 1375 mL / NET: -370 mL    27 Jul 2019 07:01  -  27 Jul 2019 18:40  --------------------------------------------------------  IN: 360 mL / OUT: 0 mL / NET: 360 mL        CAPILLARY BLOOD GLUCOSE      POCT Blood Glucose.: 91 mg/dL (27 Jul 2019 16:42)  POCT Blood Glucose.: 98 mg/dL (27 Jul 2019 12:14)  POCT Blood Glucose.: 175 mg/dL (27 Jul 2019 08:53)  POCT Blood Glucose.: 172 mg/dL (27 Jul 2019 07:57)  POCT Blood Glucose.: 208 mg/dL (26 Jul 2019 22:30)  POCT Blood Glucose.: 180 mg/dL (26 Jul 2019 21:10)      PHYSICAL EXAM:     Constitutional:  (  x ) NAD,   (   )awake and alert  HEENT: PERR, EOMI,    Neck: Soft and supple, No LAD, No JVD  Respiratory:  (   x Breath sounds are clear bilaterally,    (   ) wheezing, rales or rhonchi  Cardiovascular:     ( x  )S1 and S2, regular rate and rhythm, no Murmurs, gallops or rubs  Gastrointestinal:  (  x )Bowel Sounds present, soft,   (  )nontender, nondistended,    Extremities:    (  ) peripheral edema  Vascular: 2+ peripheral pulses  Neurological:    ( x   )A/O ,   (  ) focal deficits  Musculoskeletal:    (   )  normal strength b/l upper  (     ) normal  lower extremities  Skin: No rashes    MEDICATIONS:  MEDICATIONS  (STANDING):  acetaminophen  IVPB .. 1000 milliGRAM(s) IV Intermittent once  aspirin enteric coated 81 milliGRAM(s) Oral daily  atorvastatin 40 milliGRAM(s) Oral at bedtime  chlorhexidine 2% Cloths 1 Application(s) Topical daily  clopidogrel Tablet 75 milliGRAM(s) Oral daily  dextrose 5%. 1000 milliLiter(s) (50 mL/Hr) IV Continuous <Continuous>  dextrose 50% Injectable 12.5 Gram(s) IV Push once  dextrose 50% Injectable 25 Gram(s) IV Push once  dextrose 50% Injectable 25 Gram(s) IV Push once  digoxin     Tablet 0.125 milliGRAM(s) Oral daily  docusate sodium 100 milliGRAM(s) Oral two times a day  furosemide   Injectable 60 milliGRAM(s) IV Push two times a day  hydrALAZINE 50 milliGRAM(s) Oral every 8 hours  insulin glargine Injectable (LANTUS) 46 Unit(s) SubCutaneous at bedtime  insulin lispro (HumaLOG) corrective regimen sliding scale   SubCutaneous three times a day before meals  insulin lispro (HumaLOG) corrective regimen sliding scale   SubCutaneous at bedtime  insulin lispro Injectable (HumaLOG) 26 Unit(s) SubCutaneous three times a day before meals  isosorbide   dinitrate Tablet (ISORDIL) 30 milliGRAM(s) Oral three times a day  levothyroxine 50 MICROGram(s) Oral daily  lidocaine   Patch 1 Patch Transdermal daily  melatonin 3 milliGRAM(s) Oral at bedtime  montelukast 10 milliGRAM(s) Oral daily  pantoprazole    Tablet 40 milliGRAM(s) Oral before breakfast  polyethylene glycol 3350 17 Gram(s) Oral daily  senna 2 Tablet(s) Oral at bedtime      LABS: All Labs Reviewed:                        8.8    8.17  )-----------( 356      ( 27 Jul 2019 09:06 )             29.2     07-27    141  |  100  |  46<H>  ----------------------------<  170<H>  3.7   |  22  |  1.63<H>    Ca    10.2      27 Jul 2019 08:44  Phos  3.8     07-27  Mg     2.0     07-27    TPro  8.5<H>  /  Alb  4.1  /  TBili  0.3  /  DBili  x   /  AST  44<H>  /  ALT  37  /  AlkPhos  126<H>  07-27      CARDIAC MARKERS ( 27 Jul 2019 08:44 )  x     / x     / x     / x     / 3.6 ng/mL      Blood Culture:   Urine Culture      RADIOLOGY/EKG:    ASSESSMENT AND PLAN:  Pt is a 72 yo woman with PMHx of HTN, T2DM (A1c 7.4%), CAD s/p CABG (LIMA to LAD, SVG to OM, SVG to PDA 2014 at [facility]), non-dilated ICM (EF 20-25%), severe mitral regurgitation s/p mitral clip (6/13/19 Dr. Newman), severe pulm HTN, CKD, hypothyroidism, who is presenting to ED after experiencing worsening SOB and intermittent chest [facility]. Of note, pt was recently discharged on 6/19 after having mitral clip procedure completed. She initially required BiPAP with improvement in her respiratory status following diuresis. Initiated on vasopressors and inotropes in CCU, which were subsequently weaned off. Infectious work up was negative.    She remains volume overloaded with NYHA Class IIIb symptoms. She is not adequately responding to current diuretic regimen. Her weight is unchanged today despite increase in diuretics. Her Scrt is continuing to downtrend and now close to baseline. She is normotensive tolerating uptitration of vasodilators.     Problem/Plan - 1:  ·  Problem: Acute on chronic systolic heart failure.  Plan: - Please increase ISDN to 20 mg PO TID, hold for SBP < 90  -  on lasix to 60 mg IV BID Patient condition  better will change to po   after heart failure follow-up	  - Continue hydralazine 50mg PO TID, hold for SBP < 90  - No ARB/ARNI/MRA at this time due to renal dysfunction  - Will hold on initiation of beta block until euvolemic  - Daily standing weights, strict I &Os.     Problem/Plan - 2:  ·  Problem: Severe mitral regurgitation.  Plan: - s/p Mitral clip   - TTE on 7/9: mild MR.     Problem/Plan - 3:  ·  Problem:  DM insulin glargine Injectable (LANTUS) 46 Unit(s) SubCutaneous at bedtime We will ask Endocrinologist to see the patient  insulin lispro (HumaLOG) corrective regimen sliding scale   SubCutaneous three times a day before meals  insulin lispro (HumaLOG) corrective regimen sliding scale   SubCutaneous at bedtime  insulin lispro Injectable (HumaLOG) 26 Unit(s) SubCutaneous three times a day before meals        Problem/Plan - 4:  ·  Problem: SVT (supraventricular tachycardia).  Plan: - Currently SR  - Continue to monitor on tele    -  CAD (coronary artery disease).  Plan: -Continue ASA, clopidogrel.   continue low-dose digoxin 0.125     Problem/Plan - 5:  ·  Problem: Acute renal failure/Anemia Receive  1 unit RBC  DVT PPX:    ADVANCED DIRECTIVE:    DISPOSITION:family wishes to transfer to [facility] for rehab discussed with  [None] : None

## 2024-08-05 NOTE — H&P ADULT - NSHPPOACENTRALVENOUSCATHETER_GEN_ALL_CORE
What Type Of Note Output Would You Prefer (Optional)?: Standard Output How Severe Is Your Rash?: moderate Is This A New Presentation, Or A Follow-Up?: Rash Additional History: Pt states she has a rash that does not go away and would like to get it treated no

## 2024-10-15 NOTE — DIETITIAN INITIAL EVALUATION ADULT. - ENTER TO (CAL/KG)
General Surgery Clinic Discharge Instructions      Bin Oh  10/15/24      Diagnosis: perianal abscess    Medications/Treatments:   (Take all medications as prescribed by your provider)  Incision and drainage of perianal abscess - remove packing and change dressing tomorrow  Continue sitz baths daily  Restart your xarelto on 10/18/2024  Hold your xarelto again on 10/26/2024 - the Saturday before you return to see me.         Follow-up: See me in 2 weeks.       Please call our office at [phone] with any issues, questions, or concerns.        Doug George MD  10/15/24          
30

## 2024-12-18 NOTE — PROGRESS NOTE ADULT - ATTENDING COMMENTS
Detail Level: Detailed Add 03925 Cpt? (Important Note: In 2017 The Use Of 32302 Is Being Tracked By Cms To Determine Future Global Period Reimbursement For Global Periods): yes SEEMS TO BE DOING OK : FOR CTS EVALUATION!

## 2025-01-12 NOTE — ED PROVIDER NOTE - CROS ED ROS STATEMENT
----- Message from PAOLA Soler sent at 1/12/2025  8:09 AM CST -----  Please call parents with negative pertussis test. Per Dr. Elizalde's note, she can continue the azithromycin. She can return to school on Monday.   
Mom called and aware of negative pertussis.  Pt will complete course of azithromycin and will return to school when feeling better.  
all other ROS negative except as per HPI

## 2025-02-05 NOTE — DISCHARGE NOTE ADULT - MONITOR YOUR WEIGHT DAILY. CALL YOUR DOCTOR FOR A WEIGHT GAIN OF 2-3 LBS. IN 24 HRS OR 3 LBS. OR MORE IN 1 WEEK, INCREASED SHORTNESS OF BREATH, TIREDNESS OR WEAKNESS, OR INCREASED SWELLING OF YOUR FEET AND LEGS
[Initial Consultation] : an initial pain management consultation [FreeTextEntry2] : family referral Statement Selected

## 2025-02-22 NOTE — PROGRESS NOTE ADULT - SUBJECTIVE AND OBJECTIVE BOX
"Hospital Medicine    Called ie / regarding IRREG rhythm.  EKG - rate controlled atrial fibrillation (personally reviewed).    BP (!) 144/73 (BP Location: Right arm)   Pulse 78   Temp 98.3  F (36.8  C) (Oral)   Resp 20   Ht 1.626 m (5' 4.02\")   Wt 61.7 kg (136 lb 0.4 oz)   SpO2 96%   BMI 23.34 kg/m       Assessment:  Given patient's clinical status and her current sepsis with bacteremia, I don't feel there is anything necessary to be done for this abnormal rhythm at this time.  Her rate is controlled, she is not symptomatic from the atrial fibrillation and her cardiac status is irritable from her acute infection illness.  I don't think she is an anticoagulation candidate at this time.    Plan:  continue close observation and monitoring.    Jd Sands MD    " Endocrinology Attending Covering for Dr. Banks      Chief complaint  Patient is a 73y old  Female who presents with a chief complaint of Hypoxia, SOB (03 Aug 2019 08:44)   Review of systems  Patient in bed, looks comfortable, no fever,  had no hypoglycemia.    Labs and Fingersticks  CAPILLARY BLOOD GLUCOSE      POCT Blood Glucose.: 238 mg/dL (03 Aug 2019 07:45)  POCT Blood Glucose.: 177 mg/dL (02 Aug 2019 21:18)  POCT Blood Glucose.: 198 mg/dL (02 Aug 2019 16:40)  POCT Blood Glucose.: 269 mg/dL (02 Aug 2019 12:00)      Anion Gap, Serum: 20 <H> (08-03 @ 07:08)  Anion Gap, Serum: 15 (08-02 @ 06:11)      Calcium, Total Serum: 9.4 (08-03 @ 07:08)  Calcium, Total Serum: 9.5 (08-02 @ 06:11)          08-03    141  |  99  |  57<H>  ----------------------------<  219<H>  3.6   |  22  |  1.64<H>    Ca    9.4      03 Aug 2019 07:08  Mg     2.1     08-03                          9.9    8.7   )-----------( 363      ( 03 Aug 2019 07:08 )             29.6     Medications  MEDICATIONS  (STANDING):  acetaminophen  IVPB .. 1000 milliGRAM(s) IV Intermittent once  aspirin enteric coated 81 milliGRAM(s) Oral daily  atorvastatin 40 milliGRAM(s) Oral at bedtime  chlorhexidine 2% Cloths 1 Application(s) Topical daily  clopidogrel Tablet 75 milliGRAM(s) Oral daily  dextrose 5%. 1000 milliLiter(s) (50 mL/Hr) IV Continuous <Continuous>  dextrose 50% Injectable 12.5 Gram(s) IV Push once  dextrose 50% Injectable 25 Gram(s) IV Push once  dextrose 50% Injectable 25 Gram(s) IV Push once  digoxin     Tablet 0.125 milliGRAM(s) Oral daily  docusate sodium 100 milliGRAM(s) Oral two times a day  hydrALAZINE 50 milliGRAM(s) Oral every 8 hours  insulin glargine Injectable (LANTUS) 64 Unit(s) SubCutaneous at bedtime  insulin lispro (HumaLOG) corrective regimen sliding scale   SubCutaneous three times a day before meals  insulin lispro (HumaLOG) corrective regimen sliding scale   SubCutaneous at bedtime  insulin lispro Injectable (HumaLOG) 30 Unit(s) SubCutaneous before breakfast  insulin lispro Injectable (HumaLOG) 30 Unit(s) SubCutaneous before lunch  insulin lispro Injectable (HumaLOG) 36 Unit(s) SubCutaneous before dinner  isosorbide   dinitrate Tablet (ISORDIL) 30 milliGRAM(s) Oral three times a day  levothyroxine 50 MICROGram(s) Oral daily  lidocaine   Patch 1 Patch Transdermal daily  melatonin 3 milliGRAM(s) Oral at bedtime  metoprolol succinate ER 25 milliGRAM(s) Oral daily  montelukast 10 milliGRAM(s) Oral daily  pantoprazole    Tablet 40 milliGRAM(s) Oral before breakfast  polyethylene glycol 3350 17 Gram(s) Oral daily  senna 2 Tablet(s) Oral at bedtime  spironolactone 25 milliGRAM(s) Oral daily  torsemide 100 milliGRAM(s) Oral daily      Physical Exam  General: Patient comfortable in bed  Vital Signs Last 12 Hrs  T(F): 98.4 (08-03-19 @ 04:36), Max: 98.4 (08-03-19 @ 04:36)  HR: 67 (08-03-19 @ 06:29) (63 - 69)  BP: 108/56 (08-03-19 @ 06:29) (107/65 - 115/55)  BP(mean): --  RR: --  SpO2: --  Neck: No palpable thyroid nodules.  CVS: S1S2, No murmurs  Respiratory: No wheezing, no crepitations  GI: Abdomen soft, bowel sounds positive  Musculoskeletal:  edema lower extremities.   Skin: No skin rashes, no ecchymosis    Diagnostics

## 2025-03-03 NOTE — PHYSICAL THERAPY INITIAL EVALUATION ADULT - LEVEL OF INDEPENDENCE: SIT/STAND, REHAB EVAL
PPSV 30%  Needs assistance with all ADLs  Skin care, frequent repositioning  High risk of further decline given comorbidities
moderate assist (50% patients effort)

## 2025-03-07 NOTE — H&P ADULT - HISTORY OF PRESENT ILLNESS
71F Welsh speaking female PMH HTN, CKD, Hyperlipidemia, DM-II, CAD s/p CABG, s/p stents presenting with shortness of breath. Symptoms started 3 days ago with dyspnea on exertion, associated with chest pain, progressing and worsening to dyspnea at rest      Patient was recently adm to [facility] for [facility] s/p intervention to ramus intermedius (90% stenosis) s/p CORNELIA x2.  Patient was continue on ASA, clopidogrel, metoprolol, atorvastatin.  The patient was discharged on 11/20/17.  She now returns for SOB.    The patient states that she experienced sudden SOB this morning.  She reports a mild, non-productive cough and chills but denies fevers.  She denies CP, N/V/D/C, abd pain.  She continues to have SOB.  ED note states that the patient began to experience symptoms 3 days ago, with associated chest pain, that has progressed in severity to SOB at rest. In the ED pt with O2 sats in the 70s - given nitro and placed on Bipap. CXR showing pulmonary edema. EKG with  new intraventricular block and STD V3,V4,V5, Troponin 4.26. , MB 14.54. given ASA 162mg x1 and heparin gtt started. Also Noted to have Temp 100.5,  WBC 21.10, lactate 4.8.. Received vanc, cefepime, azithro . Vital signs: max 100.5F, HR , -174/, SpO2 100% BiPAP on 40% FiO2. Yes 71F Frisian speaking female PMH HTN, CKD, Hyperlipidemia, DM-II, CAD s/p CABG, s/p stents presenting with shortness of breath. Symptoms started 3 days ago with dyspnea on exertion, associated with chest pain, progressing and worsening to dyspnea at rest. Endorses non-productive cough and chills. Denies  N/V/D/C, abd pain, fevers, sick exposure   In the ED pt with O2 sats in the 70s - given nitro and placed on Bipap. CXR showing pulmonary edema. EKG with  new intraventricular block and STD V3,V4,V5, Troponin 4.26. , MB 14.54. given ASA 162mg x1 and heparin gtt started. Also Noted to have Temp 100.5,  WBC 21.10, lactate 4.8.. Received vanc, cefepime, azithro .   Vital signs: max 100.5F, HR , -174/, SpO2 100% BiPAP on 40% FiO2.     Patient with recent admission to [facility] for C s/p CORNELIA x2. ( ramus intermedius, 90% stenosis) Discharged on 11/20/17. 71F Estonian speaking female PMH HTN, CKD, Hyperlipidemia, DM-II, CAD s/p CABG, s/p stents presenting with shortness of breath. Symptoms started 3 days ago with dyspnea on exertion, associated with chest pain, progressing and worsening to dyspnea at rest. Endorses non-productive cough and chills. Denies  N/V/D/C, abd pain, fevers, sick exposure   In the ED pt with O2 sats in the 70s - given nitro and placed on Bipap. CXR showing pulmonary edema. EKG with  new intraventricular block and STD V3,V4,V5, Troponin 4.26. , MB 14.54. given ASA 162mg x1 and heparin gtt started. Also Noted to have Temp 100.5,  WBC 21.10, lactate 4.8.. Received vanc, cefepime, azithro .   Vital signs: max 100.5F, HR , -174/, SpO2 100% BiPAP on 40% FiO2.     Patient with recent admission to [facility] for staged CORNELIA to ramus intermedius, (90% stenosis) Discharged on 11/20/17.

## 2025-04-06 NOTE — PROGRESS NOTE ADULT - PROVIDER SPECIALTY LIST ADULT
CT Surgery
Critical Care
Critical Care
Endocrinology
Endocrinology
Internal Medicine
Nephrology
Structural Heart
Nephrology
CT Surgery
CT Surgery
Heart Failure
Internal Medicine
Internal Medicine
minimum assist (75% patients effort)

## 2025-06-24 NOTE — ED PROVIDER NOTE - NS ED MD DISPO DIVISION
appointment as soon as possible for a visit in 1 week(s)      Miguel Nava MD  [address] [facility].  [address]  [facility] [id number] [phone]    Schedule an appointment as soon as possible for a visit in 1 week(s)      [facility]  [address] [place] [address]  [phone]  Schedule an appointment as soon as possible for a visit in 1 week(s)          SIGNED:  YANNICK Marie CNP   6/24/2025, 11:18 AM  Time Spent for discharge: 35 minutes    
EMILIE

## 2025-07-07 NOTE — PHYSICAL THERAPY INITIAL EVALUATION ADULT - LEVEL OF INDEPENDENCE: STAND/SIT, REHAB EVAL
Airway    Performed by: Eze Silva MD  Authorized by: Eze Silva MD    Final Airway Type:  Supraglottic airway  Final Supraglottic Airway:  Unique  SGA Size*:  5  Attempts*:  1   Patient Identified, Procedure confirmed, Emergency equipment available and Safety protocols followed  Location:  OR  Urgency:  Elective  Difficult Airway: No    Indications for Airway Management:  Anesthesia  Mask Difficulty Assessment:  1 - vent by mask  Start Time: 7/7/2025 10:28 AM       minimum assist (75% patients effort)